# Patient Record
Sex: MALE | Race: WHITE | HISPANIC OR LATINO | Employment: UNEMPLOYED | URBAN - METROPOLITAN AREA
[De-identification: names, ages, dates, MRNs, and addresses within clinical notes are randomized per-mention and may not be internally consistent; named-entity substitution may affect disease eponyms.]

---

## 2017-01-03 ENCOUNTER — OFFICE VISIT (OUTPATIENT)
Dept: ORTHOPEDICS | Facility: CLINIC | Age: 52
End: 2017-01-03

## 2017-01-03 VITALS — BODY MASS INDEX: 29.88 KG/M2 | HEIGHT: 64 IN | WEIGHT: 175 LBS

## 2017-01-03 DIAGNOSIS — M17.10 PATELLOFEMORAL ARTHROSIS: ICD-10-CM

## 2017-01-03 DIAGNOSIS — S89.91XA KNEE INJURY, RIGHT, INITIAL ENCOUNTER: Primary | ICD-10-CM

## 2017-01-03 NOTE — NURSING NOTE
"Reason For Visit:   Chief Complaint   Patient presents with     RECHECK     Right knee injury, 1/1/17 fell on knee, slipped on ice     Left knee is doing well    Occupation: Not working.    Date of injury: 1/1/17  Type of injury: Fell.    Smoker: Yes    Pain Assessment  Patient Currently in Pain: Yes  Primary Pain Location: Knee  Pain Orientation: Right  Pain Descriptors:  (Feels lke \"oil is moving through his knee, elastic feeling\")  Aggravating Factors: Bending    Ht 1.626 m (5' 4\")  Wt 79.379 kg (175 lb)  BMI 30.02 kg/m2                                                     "

## 2017-01-03 NOTE — PROGRESS NOTES
"HISTORY OF PRESENT ILLNESS    Mr. Lockwood returns with improved left knee no pain today, right knee is painful to bend and walking. He states that he 'twisted it and landed on it on the ice. Has swelled up since then'  Location: right knee  Quality:  sharp    Severity:  5   Of 10 at worst and   1 of 10 at best    Duration: 1 week  Timing: with more walking  Context: occurs while walking  Associated symptoms: swelling at times  Additional history: as documented  Problem list, Medication list, Allergies, and Medical/Social/Surgical histories reviewed in [facility] and updated as appropriate.     REVIEW OF SYSTEMS 1/3  Constitutional: negative for fever, chills, change in weight  Skin: negative for worrisome rashes, moles or lesions  Eyes: negative for vision changes or irritation  Neuro: negative for weakness, dizziness or paresthesias  Psychiatric: negative for changes in mood or affect  CV: negative for chest pain, palpitations or peripheral edema  ENT / Mouth: negative for ear, mouth and throat problems  Resp: negative for significant cough or SOB  GI: negative for nausea, abdominal pain, heartburn, or change in bowel habits   MSK: negative for other joint problems currently, refer to HPI, and hand     PHYSICAL EXAM    Vital Signs: Ht 1.626 m (5' 4\")  Wt 79.379 kg (175 lb)  BMI 30.02 kg/m2 Patient declined being weighed. Body mass index is 30.02 kg/(m^2).    General  - normal appearance, in no obvious distress  CV  - normal popliteal pulse  Pulm  - normal respiratory pattern, non-labored  Musculoskeletal - right knee  - stance: normal gait without limp, no obvious leg length discrepancy  - inspection: trace effusion, no ecchymosis, normal muscle tone, normal bone and joint alignment, no obvious deformity  - palpation: medial joint line tenderness, patella and patellar tendon non-tender, normal popliteal pulse  - ROM: 135 degrees flexion, -5 degrees extension, pain at terminal extension passively  - strength: 5/5 in " flexion, 5/5 in extension  - neuro: no sensory or motor deficit  - special tests:  (-) Lachman  (-) anterior drawer  (-) posterior drawer  (-) pivot shift  (+) Raoul laterally    (-) varus at 0 and 30 degrees flexion  (-) valgus at 0 and 30 degrees flexion  (+) Richie s compression test  (-) patellar apprehension  Neuro  - no sensory or motor deficit, grossly normal coordination, normal muscle tone  Skin  - no ecchymosis, erythema, warmth, or induration, no obvious rash  Psych  - interactive, appropriate, normal mood and affect       ASSESSMENT    Mr. Lockwood is a 50 year old year old male who is in the office today for : right knee pain due to contusion, patellofemoral pain      PLAN    Reviewed right knee xray: WNL  Left knee improved  Patient requests cortisone injection for right knee      I recommend ice x 10 min every 2 hours as needed    Gave him a right knee injection    Andrew had all of their questions answered and understand to refrain from activities that are not tolerable    I recommend that Andrew return to my office for a re-examination in 1 month   He should follow up sooner if the condition worsens or if other problems arise.  It was a pleasure taking care of Andrew.    Dr. Aguila Ingram    PROCEDURE:      right   Knee joint injection   NDC 9189-6491-18 kenalog     The patient was apprised of the risks and the benefits of the procedure and consented and a written consent was signed.   The patient s  KNEE was sterilely prepped with Betadine.   40 mg of triamcinolone suspension was drawn up into a 5 mL syringe with 2 mL of 1% lidocaine   The patient was injected with a 1.5-inch 25-gauge needle at the_superiorlateral aspect of their knee joint   There were no complications. The patient tolerated the procedure well. There was minimal bleeding.   The patient was instructed to ice the knee upon leaving clinic and refrain from overuse over the next 3 days.   The patient was instructed to go to the emergency  room with any usual pain, swelling, or redness occurred in the injected area.   The patient was given a followup appointment to evaluate response to the injection to their increased range of motion and reduction of pain.

## 2017-01-03 NOTE — Clinical Note
"1/3/2017       RE: Andrew Lockwood  [address]   [place] [address]     Dear Colleague,    Thank you for referring your patient, Andrew Lockwood, to the [facility] at [facility]. Please see a copy of my visit note below.    HISTORY OF PRESENT ILLNESS    Mr. Lockwood returns with improved left knee no pain today, right knee is painful to bend and walking. He states that he 'twisted it and landed on it on the ice. Has swelled up since then'  Location: right knee  Quality:  sharp    Severity:  5   Of 10 at worst and   1 of 10 at best    Duration: 1 week  Timing: with more walking  Context: occurs while walking  Associated symptoms: swelling at times  Additional history: as documented  Problem list, Medication list, Allergies, and Medical/Social/Surgical histories reviewed in [facility] and updated as appropriate.     REVIEW OF SYSTEMS 1/3  Constitutional: negative for fever, chills, change in weight  Skin: negative for worrisome rashes, moles or lesions  Eyes: negative for vision changes or irritation  Neuro: negative for weakness, dizziness or paresthesias  Psychiatric: negative for changes in mood or affect  CV: negative for chest pain, palpitations or peripheral edema  ENT / Mouth: negative for ear, mouth and throat problems  Resp: negative for significant cough or SOB  GI: negative for nausea, abdominal pain, heartburn, or change in bowel habits   MSK: negative for other joint problems currently, refer to HPI, and hand     PHYSICAL EXAM    Vital Signs: Ht 1.626 m (5' 4\")  Wt 79.379 kg (175 lb)  BMI 30.02 kg/m2 Patient declined being weighed. Body mass index is 30.02 kg/(m^2).    General  - normal appearance, in no obvious distress  CV  - normal popliteal pulse  Pulm  - normal respiratory pattern, non-labored  Musculoskeletal - right knee  - stance: normal gait without limp, no obvious leg length discrepancy  - inspection: trace effusion, no ecchymosis, " normal muscle tone, normal bone and joint alignment, no obvious deformity  - palpation: medial joint line tenderness, patella and patellar tendon non-tender, normal popliteal pulse  - ROM: 135 degrees flexion, -5 degrees extension, pain at terminal extension passively  - strength: 5/5 in flexion, 5/5 in extension  - neuro: no sensory or motor deficit  - special tests:  (-) Lachman  (-) anterior drawer  (-) posterior drawer  (-) pivot shift  (+) Raoul laterally    (-) varus at 0 and 30 degrees flexion  (-) valgus at 0 and 30 degrees flexion  (+) Richie s compression test  (-) patellar apprehension  Neuro  - no sensory or motor deficit, grossly normal coordination, normal muscle tone  Skin  - no ecchymosis, erythema, warmth, or induration, no obvious rash  Psych  - interactive, appropriate, normal mood and affect       ASSESSMENT    Mr. Lockwood is a 50 year old year old male who is in the office today for : right knee pain due to contusion, patellofemoral pain      PLAN    Reviewed right knee xray: WNL  Left knee improved  Patient requests cortisone injection for right knee      I recommend ice x 10 min every 2 hours as needed    Gave him a right knee injection    Andrew had all of their questions answered and understand to refrain from activities that are not tolerable    I recommend that Andrew return to my office for a re-examination in 1 month   He should follow up sooner if the condition worsens or if other problems arise.  It was a pleasure taking care of Andrew.    Dr. Aguila Ingram    PROCEDURE:      right   Knee joint injection   NDC 0171-6791-92 kenalog     The patient was apprised of the risks and the benefits of the procedure and consented and a written consent was signed.   The patient s  KNEE was sterilely prepped with Betadine.   40 mg of triamcinolone suspension was drawn up into a 5 mL syringe with 2 mL of 1% lidocaine   The patient was injected with a 1.5-inch 25-gauge needle at the_superiorlateral  aspect of their knee joint   There were no complications. The patient tolerated the procedure well. There was minimal bleeding.   The patient was instructed to ice the knee upon leaving clinic and refrain from overuse over the next 3 days.   The patient was instructed to go to the emergency room with any usual pain, swelling, or redness occurred in the injected area.   The patient was given a followup appointment to evaluate response to the injection to their increased range of motion and reduction of pain    Again, thank you for allowing me to participate in the care of your patient.      Sincerely,    Aguila Ingram MD

## 2017-01-04 ENCOUNTER — TELEPHONE (OUTPATIENT)
Dept: INFECTIOUS DISEASES | Facility: CLINIC | Age: 52
End: 2017-01-04

## 2017-01-05 ENCOUNTER — OFFICE VISIT (OUTPATIENT)
Dept: INFECTIOUS DISEASES | Facility: CLINIC | Age: 52
End: 2017-01-05
Attending: INTERNAL MEDICINE
Payer: MEDICAID

## 2017-01-05 VITALS
DIASTOLIC BLOOD PRESSURE: 82 MMHG | SYSTOLIC BLOOD PRESSURE: 134 MMHG | HEART RATE: 61 BPM | BODY MASS INDEX: 30.64 KG/M2 | TEMPERATURE: 98.7 F | WEIGHT: 179.5 LBS | HEIGHT: 64 IN

## 2017-01-05 DIAGNOSIS — B58.2 CNS TOXOPLASMOSIS (H): ICD-10-CM

## 2017-01-05 DIAGNOSIS — B20 HUMAN IMMUNODEFICIENCY VIRUS (HIV) DISEASE (H): Primary | ICD-10-CM

## 2017-01-05 DIAGNOSIS — Z00.00 PREVENTATIVE HEALTH CARE: ICD-10-CM

## 2017-01-05 DIAGNOSIS — E11.9 TYPE 2 DIABETES MELLITUS WITHOUT COMPLICATION, WITHOUT LONG-TERM CURRENT USE OF INSULIN (H): ICD-10-CM

## 2017-01-05 PROCEDURE — 99212 OFFICE O/P EST SF 10 MIN: CPT | Mod: ZF

## 2017-01-05 ASSESSMENT — PAIN SCALES - GENERAL: PAINLEVEL: NO PAIN (0)

## 2017-01-05 NOTE — Clinical Note
1/5/2017       RE: Andrew Lockwood  [address]   [facility] [address]     Dear Colleague,    Thank you for referring your patient, Andrew Lockwood, to the [facility] AND INFECTIOUS DISEASES at [facility]. Please see a copy of my visit note below.    [facility] - HIV Progress Note  Dr. Sharon Rosado, [facility], [address] [place] [address], Sixth Floor, [address]  [place] [address]  Patient:  Andrew Lockwood, Date of birth 11/27/1967, Medical record number [id number]  Date of Visit: Jan 5, 2017         Assessment and Recommendations:     Human immunodeficiency virus (HIV) disease (H)  Continue Genvoya.  At present he continues Bactrim both as secondary prophylaxis for Toxoplasmosis and for primary PCP prophylaxis.  Safety and surrogate marker testing not indicated today.  However, we will check these at his next appointment.     CNS toxoplasmosis (H)  Continue secondary prophylaxis for toxoplasmosis.  He just reached a CD4 count of 200 in Nov 2015.  If he continues to maintain this level for the next 6 months, I will consider discontinuing this.       Type 2 diabetes mellitus (H)  I counseled Mr. Lockwood again about adherence to a diabetic diet because he continues to have high sugar food.  I will defer medical management to Dr. Lamb.     Preventative health care  Cardiovascular Juan Jose -               Lipids - CHOL 180, LDL 72 (8/18/2016)              Blood Pressure - /82 mmHg today  Cancer Screening              Colon - Up to date.  [facility]  Immunizations              Hepatitis A - immune, serology 4/13/15              Hepatitis B - 6/16/2016, 3/10/2016, will do third at next appointment              Influenza - Up To date              Pneumovax/Prevnar - Up to date               Tdap - 6/8/14 per [facility]              Menactra - Up to  date.    Renal Health -  UA without proteinuria and creatinine normal in 12/2016.    Sexually Transmitted Infection Risk and Screening              Gonorrhea and Chlamydia - GC/chlamydia negative on 6/2016              Syphilis - negative on 6/2016    Sharon Rosado MD  Division of Infectious Diseases and International Medicine  [phone]         History of Infectious Disease Illness:   Mr. Lockwood is a 51 year old man who I follow for HIV. He comes in for routine follow up.  Today we discussed his ongoing frequent visits to the ED, almost always for similar chronic issues - abdominal pain most frequently that I suspect is gas. I encouraged him to contact us or his PCP for care and I counseled him that opioid based pain management is not appropriate for this.  He again requested a script for Viagra today for erectile dysfunction.  I had recently given him a script that would have provided him will sufficient pills for over a year, so I advised him that I could not refill this.  He reports that his hand warts are much improved.  He is doing well with respect to his HIV medications with good adherence.  No issue with vomiting or diarrhea at this time.  Of note, Mr. Lockwood has recently moved into [facility].  He has not been liking it because he is not getting along with one of the staff members there.  He also does not like that they take some of his benefit to pay for living costs there.  Keesha Montes and I discussed this with him at some length because he is considering leaving and I think that this is not a good decision for his health compared to his recent situation of homelessness.          HIV History:   Date of Diagnosis: 4/2015  Approximated time of transmission:2008  CD4 Josue: 24  Viral Load at Diagnosis: 171,654  Opportunistic Infections:Toxoplasmosis  CMV Status: IgG+ (4/12/15)  Toxo Status: + (4/12/15), Had CNS toxoplasmosis as his primary illness at the time of diagnosis  HLA  Status:  4/25/15 negative  Tuberculosis Screening: Exposure to a friend for treated for active TB several years ago. They did not live together.  Historical use of ARVs: Triumeq, Genvoya  Historical Resistance Mutations: Susceptible        Past Medical and Surgical History:     Past Medical History   Diagnosis Date     Allergic rhinitis due to other allergen      DNS     Diabetes type 2, controlled (H)      HIV (human immunodeficiency virus infection) (H)      AIDS (H)      CNS toxoplasmosis (H)      Sleep apnea      doesn't use CPAP     GERD (gastroesophageal reflux disease)      Chronic abdominal pain      Periungual wart        Past Surgical History   Procedure Laterality Date     C nonspecific procedure       right forearm fracture     Hc explore undesc testis,inguin/scrotal       Optical tracking system craniotomy, excise tumor, combined Left 4/10/2015     Procedure: COMBINED OPTICAL TRACKING SYSTEM CRANIOTOMY, EXCISE TUMOR;  Surgeon: Mirlande Colmenares MD;  Location: [facility] OR     Colonoscopy Left 1/22/2016     Procedure: COMBINED COLONOSCOPY, SINGLE OR MULTIPLE BIOPSY/POLYPECTOMY BY BIOPSY;  Surgeon: Clark Saini MD;  Location: [facility] GI     Laparoscopy diagnostic (general) N/A 7/26/2016     Procedure: LAPAROSCOPY DIAGNOSTIC (GENERAL);  Surgeon: Susannah Arriaga MD;  Location: [facility] OR     Repair ligament ulnar collateral thumb (gamekeeper's) Right 12/2/2016     Procedure: REPAIR LIGAMENT ULNAR COLLATERAL THUMB (GAMEKEEPER'S);  Surgeon: Evin Zamorano MD;  Location: [facility] OR           Family History:     Family History   Problem Relation Age of Onset     DIABETES Brother      DIABETES Father      CANCER No family hx of      no skin cancer     Skin Cancer No family hx of      no famiy hx of skin cancer     Unknown/Adopted Mother      DIABETES Paternal Grandfather      Alzheimer Disease Father            Social History:     Social History   Substance Use Topics     Smoking status: Current Every Day Smoker --  0.25 packs/day     Last Attempt to Quit: 10/28/2016     Smokeless tobacco: Former User      Comment: just smoked very rarely/socailly in the past     Alcohol Use: No      Comment: Last etoh in 2007     Social History     Social History Narrative    Born in [facility] [place].  Came to the [place] in 1993.  Last traveled to visit family in 2008.                 Review of Systems:   CONSTITUTIONAL:  No fevers or chills  RESP: No cough, shortness of breath  CV: No chest Pain, no palpitations  GI: No nausea, vomiting or diarrhea. + abdominal pain - intermittent.  Recent visits to ED, responsive to antacids  INTEGUMENT:  Positive for periungual verruca, improving  NEURO:  Negative for headache         Current Medications:     Current Outpatient Prescriptions   Medication Sig Dispense Refill     metoclopramide (REGLAN) 10 MG tablet Take 1 tablet (10 mg) by mouth 4 times daily (before meals and nightly) 120 tablet 2     nicotine (NICODERM CQ) 21 MG/24HR 24 hr patch Place 1 patch onto the skin every 24 hours 30 patch 0     omeprazole (PRILOSEC) 20 MG CR capsule Take 2 capsules (40 mg) by mouth daily 60 capsule 0     traMADol (ULTRAM) 50 MG tablet Take 1 tablet (50 mg) by mouth every 6 hours as needed for pain 12 tablet 0     omeprazole (PRILOSEC) 20 MG CR capsule Take 2 capsules (40 mg) by mouth daily 60 capsule 0     sucralfate (CARAFATE) 1 GM tablet Take 1 tablet (1 g) by mouth 4 times daily 40 tablet 1     oxyCODONE (ROXICODONE) 5 MG IR tablet Take 1 tablet (5 mg) by mouth every 6 hours as needed for pain 20 tablet 0     ibuprofen (ADVIL/MOTRIN) 200 MG tablet Take 2 tablets (400 mg) by mouth every 8 hours as needed for pain 30 tablet 0     Calcium Carbonate-Simethicone (ROLAIDS PLUS GAS RELIEF EXT ST) 1177-80 MG CHEW Chew 2 tabs tid prn abdomen pain or bloat 84 tablet 1     tadalafil (CIALIS) 20 MG tablet Take 1 tablet (20 mg) by mouth daily as needed for erectile dysfunction 30 tablet 0     GENVOYA 700-960-007-10 MG PO TABS  "TAKE ONE TABLET BY MOUTH EVERY DAY (Patient taking differently: TAKE ONE TABLET BY MOUTH EVERY DAY (at bedtime)) 30 tablet 11     simethicone (GAS RELIEF) 80 MG chewable tablet Take 1 tablet (80 mg) by mouth every 6 hours as needed for flatulence or cramping 30 tablet 0     ranitidine (ZANTAC) 150 MG tablet Take 1 tablet (150 mg) by mouth 2 times daily as needed for heartburn 60 tablet 1     METFORMIN HCL PO Take 1,000 mg by mouth 2 times daily (with meals)       AZITHROMYCIN PO Take 600 mg by mouth once a week       blood glucose (NO BRAND SPECIFIED) lancets standard Use to test blood sugar four times daily or as directed. 100 Box 5     blood glucose monitoring (NO BRAND SPECIFIED) meter device kit Use to test blood sugar four times daily or as directed. 1 kit 0     blood glucose monitoring (NO BRAND SPECIFIED) test strip Use to test blood sugars four times daily or as directed 100 each 5     sulfamethoxazole-trimethoprim (BACTRIM DS,SEPTRA DS) 800-160 MG per tablet Take 1 tablet by mouth 2 times daily       traZODone (DESYREL) 50 MG tablet Take 1 tablet (50 mg) by mouth nightly as needed for sleep 90 tablet 1     metFORMIN (GLUCOPHAGE) 500 MG tablet TAKE TWO TABLETS BY MOUTH TWICE A DAY WITH MEALS 120 tablet 3            Immunization History:     Immunization History   Administered Date(s) Administered     Hepatitis B 03/10/2016, 06/16/2016     Influenza (IIV3) 10/17/2016     Influenza Vaccine IM 3yrs+ 4 Valent IIV4 12/22/2015     Mantoux 04/12/2015     Meningococcal (Menactra ) 08/31/2015, 03/10/2016     Pneumococcal (PCV 13) 03/10/2016     Pneumococcal 23 valent 06/16/2016     TDAP (BOOSTRIX AGES 10-64) 10/17/2016            Allergies:     Allergies   Allergen Reactions     Milk [Lac Bovis] Hives     Tylenol [Acetaminophen] Itching            Physical Exam:   /82 mmHg  Pulse 61  Temp(Src) 98.7  F (37.1  C) (Oral)  Ht 1.626 m (5' 4\")  Wt 81.421 kg (179 lb 8 oz)  BMI 30.80 kg/m2    Physical " Examination:  GENERAL:  well-developed, seated in no acute distress  HEENT:  Head is normocephalic, atraumatic   EYES:  Eyes have anicteric sclerae without conjunctival injection   RESP: BCTA, No W/R/R  CV: RRR no M/R/G  ABD: S NTND  SKIN: No systemic rash  NEUROLOGIC:  Grossly non-focal.           Laboratory Data:     CD4 Count  ABSOLUTE CD4   Date Value Ref Range Status   11/07/2016 200* 441 - 2156 cells/uL Final     CD4 HELPER T   Date Value Ref Range Status   11/07/2016 7* 28 - 63 % Final     CD4:CD8 RATIO   Date Value Ref Range Status   11/07/2016 0.25* 1.40 - 2.60 Final       HIV-1 RNA Quantitative:  HIV-1 RNA QUANT RESULT   Date Value Ref Range Status   11/07/2016  HIVND [Copies]/mL Final    HIV-1 RNA Not Detected   The DAMON AmpliPrep/DAMON TaqMan HIV-1 test is an FDA-approved in vitro nucleic   acid amplification test for the quantitation of HIV-1 RNA in human plasma (EDTA   plasma) using the DAMON AmpliPrep instrument for automated viral nucleic acid   extraction and the DAMON TaqMan Analyzer or [employer] TaqMan for automated Real   Time PCR amplification and detection of the viral nucleic acid target.   Titer results are reported in copies/ml. This assay is intended for use in   conjunction with clinical presentation and other laboratory markers of disease   prognosis and for use as an aid in assessing viral response to antiretroviral   treatment as measured by changes in plasma HIV-1 RNA levels. This test should   not be used as a donor screening test to confirm the presence of HIV-1   infection.         Metabolic Studies       SODIUM   Date Value Ref Range Status   12/28/2016 141 133 - 144 mmol/L Final   12/26/2016 138 133 - 144 mmol/L Final     POTASSIUM   Date Value Ref Range Status   12/28/2016 3.6 3.4 - 5.3 mmol/L Final   12/26/2016 3.4 3.4 - 5.3 mmol/L Final     CHLORIDE   Date Value Ref Range Status   12/28/2016 106 94 - 109 mmol/L Final   12/26/2016 106 94 - 109 mmol/L Final     CARBON DIOXIDE   Date  Value Ref Range Status   12/28/2016 27 20 - 32 mmol/L Final   12/26/2016 23 20 - 32 mmol/L Final     ANION GAP   Date Value Ref Range Status   12/28/2016 8 3 - 14 mmol/L Final   12/26/2016 9 3 - 14 mmol/L Final     UREA NITROGEN   Date Value Ref Range Status   12/28/2016 11 7 - 30 mg/dL Final   12/26/2016 17 7 - 30 mg/dL Final     CREATININE   Date Value Ref Range Status   12/28/2016 0.89 0.66 - 1.25 mg/dL Final   12/26/2016 1.01 0.66 - 1.25 mg/dL Final     GFR ESTIMATE   Date Value Ref Range Status   12/28/2016 90 >60 mL/min/1.7m2 Final     Comment:     Non  GFR Calc   12/26/2016 78 >60 mL/min/1.7m2 Final     Comment:     Non  GFR Calc     GLUCOSE   Date Value Ref Range Status   12/28/2016 108* 70 - 99 mg/dL Final   12/26/2016 111* 70 - 99 mg/dL Final     HEMOGLOBIN A1C   Date Value Ref Range Status   08/18/2016 7.0* 4.3 - 6.0 % Final     CALCIUM   Date Value Ref Range Status   12/28/2016 8.9 8.5 - 10.1 mg/dL Final   12/26/2016 8.9 8.5 - 10.1 mg/dL Final     PHOSPHORUS   Date Value Ref Range Status   12/26/2016 2.9 2.5 - 4.5 mg/dL Final     MAGNESIUM   Date Value Ref Range Status   12/28/2016 2.0 1.6 - 2.3 mg/dL Final   12/26/2016 1.9 1.6 - 2.3 mg/dL Final     LACTIC ACID   Date Value Ref Range Status   12/26/2016 1.6 0.7 - 2.1 mmol/L Final   11/06/2016 1.5 0.7 - 2.1 mmol/L Final       Hepatic Studies    BILIRUBIN TOTAL   Date Value Ref Range Status   12/28/2016 0.4 0.2 - 1.3 mg/dL Final   12/26/2016 0.3 0.2 - 1.3 mg/dL Final     ALKALINE PHOSPHATASE   Date Value Ref Range Status   12/28/2016 82 40 - 150 U/L Final   12/26/2016 80 40 - 150 U/L Final     ALBUMIN   Date Value Ref Range Status   12/28/2016 3.8 3.4 - 5.0 g/dL Final   12/26/2016 4.1 3.4 - 5.0 g/dL Final     AST   Date Value Ref Range Status   12/28/2016 17 0 - 45 U/L Final   12/26/2016 19 0 - 45 U/L Final     ALT   Date Value Ref Range Status   12/28/2016 22 0 - 70 U/L Final   12/26/2016 31 0 - 70 U/L Final        Hematology Studies      WBC   Date Value Ref Range Status   12/28/2016 6.1 4.0 - 11.0 10e9/L Final   12/26/2016 10.9 4.0 - 11.0 10e9/L Final     ABSOLUTE NEUTROPHIL   Date Value Ref Range Status   12/28/2016 3.0 1.6 - 8.3 10e9/L Final   12/26/2016 7.6 1.6 - 8.3 10e9/L Final     ABSOLUTE LYMPHOCYTES   Date Value Ref Range Status   12/28/2016 2.5 0.8 - 5.3 10e9/L Final   12/26/2016 2.4 0.8 - 5.3 10e9/L Final     ABSOLUTE MONOCYTES   Date Value Ref Range Status   12/28/2016 0.5 0.0 - 1.3 10e9/L Final   12/26/2016 0.7 0.0 - 1.3 10e9/L Final     ABSOLUTE EOSINOPHILS   Date Value Ref Range Status   12/28/2016 0.1 0.0 - 0.7 10e9/L Final   12/26/2016 0.1 0.0 - 0.7 10e9/L Final     HEMOGLOBIN   Date Value Ref Range Status   12/28/2016 13.7 13.3 - 17.7 g/dL Final   12/26/2016 14.3 13.3 - 17.7 g/dL Final     HEMATOCRIT   Date Value Ref Range Status   12/28/2016 41.6 40.0 - 53.0 % Final   12/26/2016 42.3 40.0 - 53.0 % Final     PLATELET COUNT   Date Value Ref Range Status   12/28/2016 213 150 - 450 10e9/L Final   12/26/2016 250 150 - 450 10e9/L Final       Hepatitis B Testing     Recent Labs   Lab Test  04/13/15   0816   HBCAB  Nonreactive   HEPBANG  Nonreactive   HBCM  Nonreactive   A nonreactive result suggests lack of recent exposure to the virus in the   preceding 6 months.     HBEABY  Negative  Reference range: Negative  (Note)  Performed by [facility],  [address] [facility] [id number] [phone]  [email or web address], Nayan Corley MD, Lab. Director     HBEAGN  Negative  Reference range: Negative  (Note)  Performed by [facility],  [address] [facility],[place] [id number] [phone]  [email or web address], Nayan Corley MD, Lab. Director         Hepatitis C Testing     HEPATITIS C ANTIBODY   Date Value Ref Range Status   04/13/2015  NR Final    Nonreactive   Assay performance characteristics have not been established for newborns,   infants, and children       Imaging:  Recent Results (from the past 744 hour(s))    Fingers XR, 2-3 views, right    Narrative     XR FINGER RT G/E 2 VW  12/22/2016 2:26 AM      HISTORY: thumb pain    COMPARISON: 11/18/2016    FINDINGS: PA, oblique, lateral views of the left hallux. Casting  material moderately limiting the evaluation of the osseous structures.  Normal bony alignment. No fractures are visualized. No worrisome  osseous lesions.      Impression    IMPRESSION: Study limited by overlying casting material however no  acute osseous abnormalities are seen.    I have personally reviewed the examination and initial interpretation  and I agree with the findings.    ZENY LEONG MD   US Abdomen Limited    Narrative    EXAMINATION: Limited Abdominal Ultrasound, 12/28/2016 4:06 PM     COMPARISON: CT 11/6/2016    HISTORY: Upper abdominal pain; evaluate for gallstones.    FINDINGS:   Fluid: No evidence of ascites or pleural effusions.    Liver: The liver demonstrates normal echotexture, measuring 15.3 cm in  craniocaudal dimension. There is no focal mass. Likely right hepatic  lobe cyst seen on prior CT is not well-visualized on the current exam.    Gallbladder: The gallbladder is partially contracted. There is no wall  thickening, pericholecystic fluid, positive sonographic Mendes's sign  or evidence for cholelithiasis.    Bile Ducts: No intrahepatic biliary ductal dilatation. The common bile  duct was not visualized.      Pancreas: Obscured by overlying bowel gas.    Kidney: The right kidney measures 11.9 cm long. There is no  hydronephrosis or mass.       Impression    IMPRESSION:   1.  Negative right upper quadrant ultrasound. No evidence of  cholelithiasis or cholecystitis.  2.  The pancreas was not visualized.    JOSUE BHATT MD   XR Knee RT 3 vw    Narrative    XR KNEE RT 3 VW 1/3/2017 5:17 PM     HISTORY:  Unspecified injury of right lower leg, initial encounter    COMPARISON: 11/22/2016.    FINDINGS: Films include standing AP, standing lateral and view of the  patellofemoral joint  45 degrees.    Question of subtle subchondral sclerosis medial tibial condyle.  Femoral tibial and patellofemoral joint spaces are normal. There is no  evidence of joint effusion. There is no evidence of patellar tilt or  subluxation.      Impression    IMPRESSION: Question of subtle subchondral sclerosis medial tibial  condyle. No joint space narrowing.    KEIRY MENDEZ MD       Again, thank you for allowing me to participate in the care of your patient.      Sincerely,    Sharon Rosado MD

## 2017-01-05 NOTE — NURSING NOTE
"Chief Complaint   Patient presents with     RECHECK     F/U B20       Initial /82 mmHg  Pulse 61  Temp(Src) 98.7  F (37.1  C) (Oral)  Ht 1.626 m (5' 4\")  Wt 81.421 kg (179 lb 8 oz)  BMI 30.80 kg/m2 Estimated body mass index is 30.8 kg/(m^2) as calculated from the following:    Height as of this encounter: 1.626 m (5' 4\").    Weight as of this encounter: 81.421 kg (179 lb 8 oz).  BP completed using cuff size: regular  "

## 2017-01-09 DIAGNOSIS — E11.9 DM (DIABETES MELLITUS) (H): Primary | ICD-10-CM

## 2017-01-13 ENCOUNTER — OFFICE VISIT (OUTPATIENT)
Dept: DERMATOLOGY | Facility: CLINIC | Age: 52
End: 2017-01-13

## 2017-01-13 ENCOUNTER — OFFICE VISIT (OUTPATIENT)
Dept: INTERNAL MEDICINE | Facility: CLINIC | Age: 52
End: 2017-01-13

## 2017-01-13 VITALS
HEART RATE: 84 BPM | SYSTOLIC BLOOD PRESSURE: 122 MMHG | RESPIRATION RATE: 18 BRPM | OXYGEN SATURATION: 97 % | DIASTOLIC BLOOD PRESSURE: 82 MMHG | BODY MASS INDEX: 31.53 KG/M2 | WEIGHT: 183.8 LBS

## 2017-01-13 DIAGNOSIS — F51.04 PSYCHOPHYSIOLOGICAL INSOMNIA: ICD-10-CM

## 2017-01-13 DIAGNOSIS — B07.8 OTHER VIRAL WARTS: Primary | ICD-10-CM

## 2017-01-13 DIAGNOSIS — L72.0 EPIDERMAL CYST: Primary | ICD-10-CM

## 2017-01-13 DIAGNOSIS — D48.5 NEOPLASM OF UNCERTAIN BEHAVIOR OF SKIN: ICD-10-CM

## 2017-01-13 DIAGNOSIS — F41.9 ANXIETY: ICD-10-CM

## 2017-01-13 PROCEDURE — 88305 TISSUE EXAM BY PATHOLOGIST: CPT | Performed by: DERMATOLOGY

## 2017-01-13 RX ORDER — LIDOCAINE HYDROCHLORIDE AND EPINEPHRINE 10; 10 MG/ML; UG/ML
3 INJECTION, SOLUTION INFILTRATION; PERINEURAL ONCE
Qty: 3 ML | Refills: 0 | OUTPATIENT
Start: 2017-01-13 | End: 2017-01-13

## 2017-01-13 RX ORDER — TRAZODONE HYDROCHLORIDE 50 MG/1
50 TABLET, FILM COATED ORAL
Qty: 90 TABLET | Refills: 1 | Status: SHIPPED
Start: 2017-01-13 | End: 2017-01-23

## 2017-01-13 ASSESSMENT — PAIN SCALES - GENERAL
PAINLEVEL: NO PAIN (0)

## 2017-01-13 NOTE — PROGRESS NOTES
"[facility] Dermatology Note    Dermatology Problem List:  1. HIV positive - last CD4 count 200 and viral load undetectable per patient  2. 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 Plan for treatment with intralesional bleomycin with Dr. Singh at [facility] office. Avoid immunotherapy in the absence of immune system reconstitution. S/p multiple rounds of cryotherapy (4/18, 6/15, 7/22, 8/18, 8/31), Aldara (no reaction), cidofovir 1%, and intermittent topical 5-FU use.    3. Verruca plantaris s/p cryotherapy 4/18, 7/22  4. NUB - right lumbar back s/p biopsy 1/13/17    CC:   Chief Complaint   Patient presents with     Derm Problem     cyst follow up, \"it's growing out very fast.\"     Date of Service: Jan 13, 2017    History of Present Illness:  Mr. Andrew Lockwood is a 51 year old male who presents as a referral from Dr. Dean for an evaluation of a bump on his lower back. Today the patient reports that the of spot of concern is a bother to him. He notes that it is growing out very fast. He had a similar lesion on the right upper back which was excised in the past. He also reports that his warts are slowly getting better due to Vicks vapor rub and the patient is uninterested in treatment today. The patient reports no other lesions of concern at this time.    Otherwise, the patient reports no painful, bleeding, nonhealing, or pruritic lesions, and denies new or changing moles.    Past Medical History:   Patient Active Problem List   Diagnosis     Allergic rhinitis due to other allergen     Brain lesion     Toxoplasma encephalitis (H)     Pulmonary nodules     AIDS with toxoplasmosis     Human immunodeficiency virus (HIV) disease (H)     Folliculitis     Prurigo nodularis     Epidermal cyst     Shoulder joint pain, unspecified laterality     CNS toxoplasmosis (H)     Constipation     Non-intractable vomiting with nausea, vomiting of unspecified type     Thrush     Insomnia, " unspecified insomnia     Bowel obstruction (H)     Toxoplasmosis     Acquired immunodeficiency syndrome (H)     Gastroesophageal reflux disease     Headache     Aphthous ulcer     Type 2 diabetes mellitus (H)     Small bowel obstruction (H)     SBO (small bowel obstruction) (H)     Slow transit constipation     Periungual wart     Herpes zoster     Erectile dysfunction, unspecified erectile dysfunction type     Insomnia, unspecified type     Past Medical History   Diagnosis Date     Allergic rhinitis due to other allergen      DNS     Diabetes type 2, controlled (H)      HIV (human immunodeficiency virus infection) (H)      AIDS (H)      CNS toxoplasmosis (H)      Sleep apnea      doesn't use CPAP     GERD (gastroesophageal reflux disease)      Chronic abdominal pain      Periungual wart      Past Surgical History   Procedure Laterality Date     C nonspecific procedure       right forearm fracture     Hc explore undesc testis,inguin/scrotal       Optical tracking system craniotomy, excise tumor, combined Left 4/10/2015     Procedure: COMBINED OPTICAL TRACKING SYSTEM CRANIOTOMY, EXCISE TUMOR;  Surgeon: Mirlande Colmenares MD;  Location: [facility] OR     Colonoscopy Left 1/22/2016     Procedure: COMBINED COLONOSCOPY, SINGLE OR MULTIPLE BIOPSY/POLYPECTOMY BY BIOPSY;  Surgeon: Clark Saini MD;  Location: [facility] GI     Laparoscopy diagnostic (general) N/A 7/26/2016     Procedure: LAPAROSCOPY DIAGNOSTIC (GENERAL);  Surgeon: Susannah Arriaga MD;  Location: [facility] OR     Repair ligament ulnar collateral thumb (gamekeeper's) Right 12/2/2016     Procedure: REPAIR LIGAMENT ULNAR COLLATERAL THUMB (GAMEKEEPER'S);  Surgeon: Evin Zamorano MD;  Location:  OR     Social History:  The patient is homeless. The patient was born in [facility]. He came [place] the [place] in 1993. He last traveled to visit his family in 2008.     Family History:  There is no family history of skin cancer.     Medications:  Current Outpatient  Prescriptions   Medication Sig Dispense Refill     traZODone (DESYREL) 50 MG tablet Take 1 tablet (50 mg) by mouth nightly as needed for sleep 90 tablet 1     metFORMIN (GLUCOPHAGE) 500 MG tablet TAKE TWO TABLETS BY MOUTH TWICE A DAY WITH MEALS 120 tablet 3     metoclopramide (REGLAN) 10 MG tablet Take 1 tablet (10 mg) by mouth 4 times daily (before meals and nightly) 120 tablet 2     nicotine (NICODERM CQ) 21 MG/24HR 24 hr patch Place 1 patch onto the skin every 24 hours 30 patch 0     omeprazole (PRILOSEC) 20 MG CR capsule Take 2 capsules (40 mg) by mouth daily 60 capsule 0     traMADol (ULTRAM) 50 MG tablet Take 1 tablet (50 mg) by mouth every 6 hours as needed for pain 12 tablet 0     omeprazole (PRILOSEC) 20 MG CR capsule Take 2 capsules (40 mg) by mouth daily 60 capsule 0     sucralfate (CARAFATE) 1 GM tablet Take 1 tablet (1 g) by mouth 4 times daily 40 tablet 1     oxyCODONE (ROXICODONE) 5 MG IR tablet Take 1 tablet (5 mg) by mouth every 6 hours as needed for pain 20 tablet 0     ibuprofen (ADVIL/MOTRIN) 200 MG tablet Take 2 tablets (400 mg) by mouth every 8 hours as needed for pain 30 tablet 0     Calcium Carbonate-Simethicone (ROLAIDS PLUS GAS RELIEF EXT ST) 1177-80 MG CHEW Chew 2 tabs tid prn abdomen pain or bloat 84 tablet 1     tadalafil (CIALIS) 20 MG tablet Take 1 tablet (20 mg) by mouth daily as needed for erectile dysfunction 30 tablet 0     GENVOYA [phone] MG PO TABS TAKE ONE TABLET BY MOUTH EVERY DAY (Patient taking differently: TAKE ONE TABLET BY MOUTH EVERY DAY (at bedtime)) 30 tablet 11     simethicone (GAS RELIEF) 80 MG chewable tablet Take 1 tablet (80 mg) by mouth every 6 hours as needed for flatulence or cramping 30 tablet 0     ranitidine (ZANTAC) 150 MG tablet Take 1 tablet (150 mg) by mouth 2 times daily as needed for heartburn 60 tablet 1     METFORMIN HCL PO Take 1,000 mg by mouth 2 times daily (with meals)       AZITHROMYCIN PO Take 600 mg by mouth once a week       blood  glucose (NO BRAND SPECIFIED) lancets standard Use to test blood sugar four times daily or as directed. 100 Box 5     blood glucose monitoring (NO BRAND SPECIFIED) meter device kit Use to test blood sugar four times daily or as directed. 1 kit 0     blood glucose monitoring (NO BRAND SPECIFIED) test strip Use to test blood sugars four times daily or as directed 100 each 5     sulfamethoxazole-trimethoprim (BACTRIM DS,SEPTRA DS) 800-160 MG per tablet Take 1 tablet by mouth 2 times daily       Allergies:  Allergies   Allergen Reactions     Milk [Lac Bovis] Hives     Tylenol [Acetaminophen] Itching     Review of Systems:  - Skin: As above in HPI. No additional skin concerns.    Physical exam:  Vitals: There were no vitals taken for this visit.  GEN: This is a well developed, well-nourished male in no acute distress, in a pleasant mood.      SKIN: Focused examination of the hands and lower back was performed.  - There has been interval improvement, but currently there is a thin ridge approximately 6 mm in length near the left lateral nail fold of the 2nd digit though reportedly smaller than previous  - 1 cm ridge on the medial aspect of the left 5th finger  - Larger 13 mm linear verrucous plaque on the lateral aspect of the right 3rd finger  - On the left lumbar back, there is a 6 mm fleshy tan to whitish papule that slightly invaginates with pressure, no overlying punctum or significant vascular alterations.   - No other lesions of concern on areas examined.     Impression/Plan:  1. Verruca vulgaris, recalcitrant - followed by Dr. Singh both here and in private practice  - The patient is confident that Vicks vapor rub is improving the lesions, but it was discussed that this is probably not the case.   - Discussion of possibly treatments including Efudex cream, wart stick, and the injections provided by   - The patient reports that he does not have any money to purchase the wart stick.   - The patient would  like to do the injections (bleomycin) provided by Dr. Singh and will follow up with him.     2. 6 mm fleshy tan to whitish papule - right lumbar back. Neoplasm of uncertain behavior. Differential diagnosis includes neurofibroma vs intradermal nevus vs cyst vs other.   - Punch biopsy:  After discussion of benefits and risks including but not limited to bleeding/bruising, pain/swelling, infection, scar, incomplete removal, nerve damage/numbness, recurrence, and non-diagnostic biopsy, written consent, verbal consent and photographs were obtained. Time-out was performed. The area was cleaned with isopropyl alcohol. 0.5 mL of 1% lidocaine with epinephrine was injected to obtain adequate anesthesia of the lesion on the right lumbar back. A 8 mm punch biopsy was performed.  4-0 prolene sutures were utilized to approximate the epidermal edges.  White petroleum jelly/VaselineTM and a bandage was applied to the wound.  Explicit verbal and written wound care instructions were provided.  The patient left the Dermatology Clinic in good condition. The patient was counseled to follow up for suture removal in approximately 14 days.    Follow-up pending biopsy results, earlier for new or changing lesions.    Staff Involved:  Staff Only    Scribe Disclosure:   I, Eric Francisco, am serving as a scribe to document services personally performed by Dr. Sachin Becerra, based on data collection and the provider's statements to me.     Staff attestation:  The documentation recorded by the scribe accurately reflects the services I personally performed and the decisions I personally made.    Sachin Becerra MD  Staff Dermatologist    Department of Dermatology

## 2017-01-13 NOTE — MR AVS SNAPSHOT
After Visit Summary   1/13/2017    Andrew Lockwood    MRN: [id number]           Patient Information     Date Of Birth          1965        Visit Information        Provider Department      1/13/2017 12:30 PM Sachin Becerra MD [facility] Dermatology        Today's Diagnoses     Neoplasm of uncertain behavior of skin    -  1       Care Instructions    Wound Care After a Biopsy    What is a skin biopsy?  A skin biopsy allows the doctor to examine a very small piece of tissue under the microscope to determine the diagnosis and the best treatment for the skin condition. A local anesthetic (numbing medicine)  is injected with a very small needle into the skin area to be tested. A small piece of skin is taken from the area. Sometimes a suture (stitch) is used.     What are the risks of a skin biopsy?  I will experience scar, bleeding, swelling, pain, crusting and redness. I may experience incomplete removal or recurrence. Risks of this procedure are excessive bleeding, bruising, infection, nerve damage, numbness, thick (hypertrophic or keloidal) scar and non-diagnostic biopsy.    How should I care for my wound for the first 24 hours?    Keep the wound dry and covered for 24 hours    If it bleeds, hold direct pressure on the area for 15 minutes. If bleeding does not stop then go to the emergency room    Avoid strenuous exercise the first 1-2 days or as your doctor instructs you    How should I care for the wound after 24 hours?    After 24 hours, remove the bandage    You may bathe or shower as normal    If you had a scalp biopsy, you can shampoo as usual and can use shower water to clean the biopsy site daily    Clean the wound twice a day with gentle soap and water    Do not scrub, be gentle    Apply white petroleum/Vaseline after cleaning the wound with a cotton swab or a clean finger, and keep the site covered with a Bandaid /bandage. Bandages are not necessary with a scalp biopsy    If you are  unable to cover the site with a Bandaid /bandage, re-apply ointment 2-3 times a day to keep the site moist. Moisture will help with healing    Avoid strenuous activity for first 1-2 days    Avoid lakes, rivers, pools, and oceans until the stitches are removed or the site is healed    How do I clean my wound?    Wash hands for 15 minutes with soap or use hand  before all wound care    Clean the wound with gentle soap and water    Apply white petroleum/Vaseline  to wound after it is clean    Replace the Bandaid /bandage to keep the wound covered for the first few days or as instructed by your doctor    If you had a scalp biopsy, warm shower water to the area on a daily basis should suffice    What should I use to clean my wound?     Cotton-tipped applicators (Qtips )    White petroleum jelly (Vaseline ). Use a clean new container and use Q-tips to apply.    Bandaids   as needed    Gentle soap     How should I care for my wound long term?    Do not get your wound dirty    Keep up with wound care for one week or until the area is healed.    A small scab will form and fall off by itself when the area is completely healed. The area will be red and will become pink in color as it heals. Sun protection is very important for how your scar will turn out. Sunscreen with an SPF 30 or greater is recommended once the area is healed.    If you have stitches, stitches need to be removed in 14 days. You may return to our clinic for this or you may have it done locally at your doctor s office.    You should have some soreness but it should be mild and slowly go away over several days. Talk to your doctor about using tylenol for pain,    When should I call my doctor?  If you have increased:     Pain or swelling    Pus or drainage (clear or slightly yellow drainage is ok)    Temperature over 100F    Spreading redness or warmth around wound    When will I hear about my results?  The biopsy results can take 2-3 weeks to come  back. The clinic will call you with the results, send you a mychart message, or have you schedule a follow-up clinic or phone time to discuss the results. Contact our clinics if you do not hear from us in 3 weeks.     Who should I call with questions?    [facility]: [phone]     [facility]: [phone]    For urgent needs outside of business hours call the [facility] at [phone] and ask for the dermatology resident on call            Follow-ups after your visit        Your next 10 appointments already scheduled     Jan 16, 2017  4:10 PM   (Arrive by 3:55 PM)   JULIAN Extremity with Florence Shaw PT   [facility] Physical Therapy JULIAN ([facility])    [address] [place] [address]  [address] [facility] [id number] [phone]            Jan 18, 2017  2:00 PM   (Arrive by 1:45 PM)   JULIAN Extremity with Florence Shaw PT   [facility] Physical Therapy JULIAN ([facility])    [address] [place] [address]  [address] [facility] [id number] [phone]            Jan 18, 2017  4:00 PM   (Arrive by 3:45 PM)   Return Visit with  U Ortho Nurse   [facility] Orthopaedic Clinic ([facility])    [address] [place] [address]  [address] [facility] [id number] [phone]            Jan 20, 2017  2:15 PM   (Arrive by 2:00 PM)   Return Visit with Aguila Ingram MD   [facility] Orthopa[facility] ([facility])    [address] [place] [address]  [address] [facility] [id number] [phone]            Jan 27, 2017  9:00 AM   Nurse Visit with  Dermatology Nurse   [facility] Dermatology ([facility])    [address] [place] [address]  [address] [facility] [id number] [phone]            Apr 25, 2017  8:00 AM   (Arrive by 7:45 AM)   New Patient Visit with Jonas Alan   Gastroenterology and IBD ([facility])    [address] [place] [address]  Se  4th Floor  [facility] [id number] [phone]              Who to contact     Please call your clinic at [phone] to:    Ask questions about your health    Make or cancel appointments    Discuss your medicines    Learn about your test results    Speak to your doctor   If you have compliments or concerns about an experience at your clinic, or if you wish to file a complaint, please contact [facility] Patient Relations at [phone] or email us at [email or web address]         Additional Information About Your Visit        [employer] Information     [employer] gives you secure access to your electronic health record. If you see a primary care provider, you can also send messages to your care team and make appointments. If you have questions, please call your primary care clinic.  If you do not have a primary care provider, please call [phone] and they will assist you.      [employer] is an electronic gateway that provides easy, online access to your medical records. With [employer], you can request a clinic appointment, read your test results, renew a prescription or communicate with your care team.     To access your existing account, please contact your [facility] Clinic or call [phone] for assistance.        Care EveryWhere ID     This is your Care EveryWhere ID. This could be used by other organizations to access your [place] medical records  [id number]         Blood Pressure from Last 3 Encounters:   01/13/17 122/82   01/05/17 134/82   12/29/16 134/89    Weight from Last 3 Encounters:   01/13/17 83.371 kg (183 lb 12.8 oz)   01/05/17 81.421 kg (179 lb 8 oz)   01/03/17 79.379 kg (175 lb)              We Performed the Following     BIOPSY SKIN/SUBQ/MUC MEM, SINGLE LESION     Surgical pathology exam          Today's Medication Changes          These changes are accurate as of: 1/13/17  1:09 PM.  If you have any questions, ask your nurse or  doctor.               Start taking these medicines.        Dose/Directions    lidocaine 1% with EPINEPHrine 1:100,000 1 %-1:349110 injection   Used for:  Neoplasm of uncertain behavior of skin   Started by:  Sachin Becerra MD        Dose:  3 mL   Inject 3 mLs into the skin once for 1 dose   Quantity:  3 mL   Refills:  0       traZODone 50 MG tablet   Commonly known as:  DESYREL   Used for:  Psychophysiological insomnia   Started by:  Margareth Dean APRN CNP        Dose:  50 mg   Take 1 tablet (50 mg) by mouth nightly as needed for sleep   Quantity:  90 tablet   Refills:  1         These medicines have changed or have updated prescriptions.        Dose/Directions    GENVOYA [phone] MG Tabs per tablet   This may have changed:  See the new instructions.   Used for:  Human immunodeficiency virus (HIV) disease (H)   Generic drug:  Cvwjceo-Ddemq-Ljerwzhh-TenofAF        TAKE ONE TABLET BY MOUTH EVERY DAY   Quantity:  30 tablet   Refills:  11            Where to get your medicines      These medications were sent to [place] [id number] [facility] [address]  [address] [place] [address] [address] [id number]    Hours:  TRANSPLANT PHONE NUMBER [phone] Phone:  [phone]    - traZODone 50 MG tablet      Some of these will need a paper prescription and others can be bought over the counter.  Ask your nurse if you have questions.     You don't need a prescription for these medications    - lidocaine 1% with EPINEPHrine 1:100,000 1 %-1:919719 injection             Primary Care Provider Office Phone # Fax #    Sharon Rosado [phone]       [address]  [facility] [address]        Thank you!     Thank you for choosing [facility] DERMATOLOGY  for your care. Our goal is always to provide you with excellent care. Hearing back from our patients is one way we can continue to improve our services. Please take a  few minutes to complete the written survey that you may receive in the mail after your visit with us. Thank you!             Your Updated Medication List - Protect others around you: Learn how to safely use, store and throw away your medicines at www.disposemymeds.org.          This list is accurate as of: 1/13/17  1:09 PM.  Always use your most recent med list.                   Brand Name Dispense Instructions for use    AZITHROMYCIN PO      Take 600 mg by mouth once a week       blood glucose lancets standard    no brand specified    100 Box    Use to test blood sugar four times daily or as directed.       blood glucose monitoring meter device kit    no brand specified    1 kit    Use to test blood sugar four times daily or as directed.       blood glucose monitoring test strip    no brand specified    100 each    Use to test blood sugars four times daily or as directed       Calcium Carbonate-Simethicone 1177-80 MG Chew    ROLAIDS PLUS GAS RELIEF EXT ST    84 tablet    Chew 2 tabs tid prn abdomen pain or bloat       GENVOYA [phone] MG Tabs per tablet   Generic drug:  Lgqcgwh-Ovysy-Jrepngig-TenofAF     30 tablet    TAKE ONE TABLET BY MOUTH EVERY DAY       ibuprofen 200 MG tablet    ADVIL/MOTRIN    30 tablet    Take 2 tablets (400 mg) by mouth every 8 hours as needed for pain       lidocaine 1% with EPINEPHrine 1:100,000 1 %-1:509291 injection     3 mL    Inject 3 mLs into the skin once for 1 dose       * METFORMIN HCL PO      Take 1,000 mg by mouth 2 times daily (with meals)       * metFORMIN 500 MG tablet    GLUCOPHAGE    120 tablet    TAKE TWO TABLETS BY MOUTH TWICE A DAY WITH MEALS       metoclopramide 10 MG tablet    REGLAN    120 tablet    Take 1 tablet (10 mg) by mouth 4 times daily (before meals and nightly)       nicotine 21 MG/24HR 24 hr patch    NICODERM CQ    30 patch    Place 1 patch onto the skin every 24 hours       * omeprazole 20 MG CR capsule    priLOSEC    60 capsule    Take 2 capsules  (40 mg) by mouth daily       * omeprazole 20 MG CR capsule    priLOSEC    60 capsule    Take 2 capsules (40 mg) by mouth daily       oxyCODONE 5 MG IR tablet    ROXICODONE    20 tablet    Take 1 tablet (5 mg) by mouth every 6 hours as needed for pain       ranitidine 150 MG tablet    ZANTAC    60 tablet    Take 1 tablet (150 mg) by mouth 2 times daily as needed for heartburn       simethicone 80 MG chewable tablet    GAS RELIEF    30 tablet    Take 1 tablet (80 mg) by mouth every 6 hours as needed for flatulence or cramping       sucralfate 1 GM tablet    CARAFATE    40 tablet    Take 1 tablet (1 g) by mouth 4 times daily       sulfamethoxazole-trimethoprim 800-160 MG per tablet    BACTRIM DS/SEPTRA DS     Take 1 tablet by mouth 2 times daily       tadalafil 20 MG tablet    CIALIS    30 tablet    Take 1 tablet (20 mg) by mouth daily as needed for erectile dysfunction       traMADol 50 MG tablet    ULTRAM    12 tablet    Take 1 tablet (50 mg) by mouth every 6 hours as needed for pain       traZODone 50 MG tablet    DESYREL    90 tablet    Take 1 tablet (50 mg) by mouth nightly as needed for sleep       * Notice:  This list has 4 medication(s) that are the same as other medications prescribed for you. Read the directions carefully, and ask your doctor or other care provider to review them with you.

## 2017-01-13 NOTE — NURSING NOTE
Chief Complaint   Patient presents with     Insomnia     Patient is here to discuss problems falling and staying asleep. Duration 1 month got worse over the past 4 days.      Derm Problem     Patient is here to discuss a possible cyst on left lower back.      Bianka Norton LPN at 10:33 AM on 1/13/2017.

## 2017-01-13 NOTE — PATIENT INSTRUCTIONS
[facility] Medication Refill Request Information:  * Please contact your pharmacy regarding ANY request for medication refills.  ** [facility] Prescription Fax = [phone]  * Please allow 3 business days for routine medication refills.  * Please allow 5 business days for controlled substance medication refills.     [facility] Test Result notification information:  *You will be notified with in 7-10 days of your appointment day regarding the results of your test.  If you are on MyChart you will be notified as soon as the provider has reviewed the results and signed off on them.    [facility] Phone Number [phone]      Insomnia  Insomnia refers to a difficulty going to sleep or staying asleep, or both. Insomnia has many causes, including anxiety, stress, depression, chronic pain, sleeping cycles out of balance due to working night shifts or excess napping during the day, and a condition called  sleep apnea . Insomnia can be a side effect from stimulant medicines such as decongestants, asthma inhalers and pills, diet pills, and illegal drugs such as speed, crank, crack, and PCP.  Home Care:  1. Review your medicines with your doctor or pharmacist to find out if they can cause insomnia.  2. Caffeine, smoking and alcohol also affect sleep. Limit your daily use and do not use these before bedtime. Alcohol may make you sleepy at first, but as its effects wear off, you may awaken a few hours later and have trouble returning to sleep.  3. Do not exercise, eat or drink large amounts of liquid within 2 hours of your bedtime.  4. Improve your sleep habits. Have a fixed bed and wake-up time. Try to keep noise, light and heat in your bedroom at a comfortable level. Try using earplugs or eyeshades if needed.   5. Avoid watching TV in bed.  6. If you do not fall asleep within 30 minutes, try to relax by reading or listening to soft music.  7. Limit daytime napping to one 30 minute period, early in the  day.  8. Get regular exercise. Find other ways to lessen your stress level.  9. If a medicine was prescribed to help reset your sleep patterns, take it as directed. Sleeping pills are intended for short-term use, only. If taken for too long, the effect wears off while the risk of physical addiction and psychological dependence increases.  Follow-Up  with your doctor or as directed by our staff if you feel that your insomnia is not responding to the above measures.  Get Prompt Medical Attention  if any of the following occur:    Extreme restlessness or irritability    Confusion or hallucinations (seeing or hearing things that are not there)    Anxiety, depression    Several days without sleeping    1163-4366 [employer]. [address] [place] [address]. All rights reserved. This information is not intended as a substitute for professional medical care. Always follow your healthcare professional's instructions.      Health Psychology (Dr. Osbaldo Panchal) [phone] (4th Floor [facility])   Health Psychology (Dr. Mirtha Drew) [phone] (4th Floor [facility])   Health Psychology (Dr. Rosemarie Alejandra) [phone] (4th Floor [facility])   Health Psychology (Dr. Arlene De Souza) [phone] (4th Floor [facility])  Health Psychology (Dr. Elizabeth Nevarez) [phone] (4th Floor [facility])    Dermatology [phone] (3rd Floor [facility])

## 2017-01-13 NOTE — Clinical Note
"1/13/2017       RE: Andrew Lockwood  [address]   [facility] [address]     Dear Colleague,    Thank you for referring your patient, Andrew Lockwood, to the [facility] DERMATOLOGY at [facility]. Please see a copy of my visit note below.    [facility] Dermatology Note    Dermatology Problem List:  1. HIV positive - last CD4 count 200 and viral load undetectable per patient  2. 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 Plan for treatment with intralesional bleomycin with Dr. Singh at [facility] office. Avoid immunotherapy in the absence of immune system reconstitution. S/p multiple rounds of cryotherapy (4/18, 6/15, 7/22, 8/18, 8/31), Aldara (no reaction), cidofovir 1%, and intermittent topical 5-FU use.    3. Verruca plantaris s/p cryotherapy 4/18, 7/22  4. NUB - right lumbar back s/p biopsy 1/13/17    CC:   Chief Complaint   Patient presents with     Derm Problem     cyst follow up, \"it's growing out very fast.\"     Date of Service: Jan 13, 2017    History of Present Illness:  Mr. Anderw Lockwood is a 51 year old male who presents as a referral from Dr. Dean for an evaluation of a bump on his lower back. Today the patient reports that the of spot of concern is a bother to him. He notes that it is growing out very fast. He had a similar lesion on the right upper back which was excised in the past. He also reports that his warts are slowly getting better due to Vicks vapor rub and the patient is uninterested in treatment today. The patient reports no other lesions of concern at this time.    Otherwise, the patient reports no painful, bleeding, nonhealing, or pruritic lesions, and denies new or changing moles.    Past Medical History:   Patient Active Problem List   Diagnosis     Allergic rhinitis due to other allergen     Brain lesion     Toxoplasma encephalitis (H)     Pulmonary nodules     AIDS with toxoplasmosis     Human " immunodeficiency virus (HIV) disease (H)     Folliculitis     Prurigo nodularis     Epidermal cyst     Shoulder joint pain, unspecified laterality     CNS toxoplasmosis (H)     Constipation     Non-intractable vomiting with nausea, vomiting of unspecified type     Thrush     Insomnia, unspecified insomnia     Bowel obstruction (H)     Toxoplasmosis     Acquired immunodeficiency syndrome (H)     Gastroesophageal reflux disease     Headache     Aphthous ulcer     Type 2 diabetes mellitus (H)     Small bowel obstruction (H)     SBO (small bowel obstruction) (H)     Slow transit constipation     Periungual wart     Herpes zoster     Erectile dysfunction, unspecified erectile dysfunction type     Insomnia, unspecified type     Past Medical History   Diagnosis Date     Allergic rhinitis due to other allergen      DNS     Diabetes type 2, controlled (H)      HIV (human immunodeficiency virus infection) (H)      AIDS (H)      CNS toxoplasmosis (H)      Sleep apnea      doesn't use CPAP     GERD (gastroesophageal reflux disease)      Chronic abdominal pain      Periungual wart      Past Surgical History   Procedure Laterality Date     C nonspecific procedure       right forearm fracture     Hc explore undesc testis,inguin/scrotal       Optical tracking system craniotomy, excise tumor, combined Left 4/10/2015     Procedure: COMBINED OPTICAL TRACKING SYSTEM CRANIOTOMY, EXCISE TUMOR;  Surgeon: Mirlande Colmenares MD;  [facility] OR     Colonoscopy Left 1/22/2016     Procedure: COMBINED COLONOSCOPY, SINGLE OR MULTIPLE BIOPSY/POLYPECTOMY BY BIOPSY;  Surgeon: Clark Saini MD;  Location: [facility] GI     Laparoscopy diagnostic (general) N/A 7/26/2016     Procedure: LAPAROSCOPY DIAGNOSTIC (GENERAL);  Surgeon: Susannah Arriaga MD;  Location: [facility] OR     Repair ligament ulnar collateral thumb (gamekeeper's) Right 12/2/2016     Procedure: REPAIR LIGAMENT ULNAR COLLATERAL THUMB (GAMEKEEPER'S);  Surgeon: Evin Zamorano MD;   [facility] OR     Social History:  The patient is homeless. The patient was born in [facility]. [place] came [place]. He last traveled to visit his family in 2008.     Family History:  There is no family history of skin cancer.     Medications:  Current Outpatient Prescriptions   Medication Sig Dispense Refill     traZODone (DESYREL) 50 MG tablet Take 1 tablet (50 mg) by mouth nightly as needed for sleep 90 tablet 1     metFORMIN (GLUCOPHAGE) 500 MG tablet TAKE TWO TABLETS BY MOUTH TWICE A DAY WITH MEALS 120 tablet 3     metoclopramide (REGLAN) 10 MG tablet Take 1 tablet (10 mg) by mouth 4 times daily (before meals and nightly) 120 tablet 2     nicotine (NICODERM CQ) 21 MG/24HR 24 hr patch Place 1 patch onto the skin every 24 hours 30 patch 0     omeprazole (PRILOSEC) 20 MG CR capsule Take 2 capsules (40 mg) by mouth daily 60 capsule 0     traMADol (ULTRAM) 50 MG tablet Take 1 tablet (50 mg) by mouth every 6 hours as needed for pain 12 tablet 0     omeprazole (PRILOSEC) 20 MG CR capsule Take 2 capsules (40 mg) by mouth daily 60 capsule 0     sucralfate (CARAFATE) 1 GM tablet Take 1 tablet (1 g) by mouth 4 times daily 40 tablet 1     oxyCODONE (ROXICODONE) 5 MG IR tablet Take 1 tablet (5 mg) by mouth every 6 hours as needed for pain 20 tablet 0     ibuprofen (ADVIL/MOTRIN) 200 MG tablet Take 2 tablets (400 mg) by mouth every 8 hours as needed for pain 30 tablet 0     Calcium Carbonate-Simethicone (ROLAIDS PLUS GAS RELIEF EXT ST) 1177-80 MG CHEW Chew 2 tabs tid prn abdomen pain or bloat 84 tablet 1     tadalafil (CIALIS) 20 MG tablet Take 1 tablet (20 mg) by mouth daily as needed for erectile dysfunction 30 tablet 0     GENVOYA [phone] MG PO TABS TAKE ONE TABLET BY MOUTH EVERY DAY (Patient taking differently: TAKE ONE TABLET BY MOUTH EVERY DAY (at bedtime)) 30 tablet 11     simethicone (GAS RELIEF) 80 MG chewable tablet Take 1 tablet (80 mg) by mouth every 6 hours as needed for flatulence or  cramping 30 tablet 0     ranitidine (ZANTAC) 150 MG tablet Take 1 tablet (150 mg) by mouth 2 times daily as needed for heartburn 60 tablet 1     METFORMIN HCL PO Take 1,000 mg by mouth 2 times daily (with meals)       AZITHROMYCIN PO Take 600 mg by mouth once a week       blood glucose (NO BRAND SPECIFIED) lancets standard Use to test blood sugar four times daily or as directed. 100 Box 5     blood glucose monitoring (NO BRAND SPECIFIED) meter device kit Use to test blood sugar four times daily or as directed. 1 kit 0     blood glucose monitoring (NO BRAND SPECIFIED) test strip Use to test blood sugars four times daily or as directed 100 each 5     sulfamethoxazole-trimethoprim (BACTRIM DS,SEPTRA DS) 800-160 MG per tablet Take 1 tablet by mouth 2 times daily       Allergies:  Allergies   Allergen Reactions     Milk [Lac Bovis] Hives     Tylenol [Acetaminophen] Itching     Review of Systems:  - Skin: As above in HPI. No additional skin concerns.    Physical exam:  Vitals: There were no vitals taken for this visit.  GEN: This is a well developed, well-nourished male in no acute distress, in a pleasant mood.      SKIN: Focused examination of the hands and lower back was performed.  - There has been interval improvement, but currently there is a thin ridge approximately 6 mm in length near the left lateral nail fold of the 2nd digit though reportedly smaller than previous  - 1 cm ridge on the medial aspect of the left 5th finger  - Larger 13 mm linear verrucous plaque on the lateral aspect of the right 3rd finger  - On the left lumbar back, there is a 6 mm fleshy tan to whitish papule that slightly invaginates with pressure, no overlying punctum or significant vascular alterations.   - No other lesions of concern on areas examined.     Impression/Plan:  1. Verruca vulgaris, recalcitrant - followed by Dr. Singh both here and in private practice  - The patient is confident that Vicks vapor rub is improving the lesions,  but it was discussed that this is probably not the case.   - Discussion of possibly treatments including Efudex cream, wart stick, and the injections provided by   - The patient reports that he does not have any money to purchase the wart stick.   - The patient would like to do the injections (bleomycin) provided by Dr. Singh and will follow up with him.     2. 6 mm fleshy tan to whitish papule - right lumbar back. Neoplasm of uncertain behavior. Differential diagnosis includes neurofibroma vs intradermal nevus vs cyst vs other.   - Punch biopsy:  After discussion of benefits and risks including but not limited to bleeding/bruising, pain/swelling, infection, scar, incomplete removal, nerve damage/numbness, recurrence, and non-diagnostic biopsy, written consent, verbal consent and photographs were obtained. Time-out was performed. The area was cleaned with isopropyl alcohol. 0.5 mL of 1% lidocaine with epinephrine was injected to obtain adequate anesthesia of the lesion on the right lumbar back. A 8 mm punch biopsy was performed.  4-0 prolene sutures were utilized to approximate the epidermal edges.  White petroleum jelly/VaselineTM and a bandage was applied to the wound.  Explicit verbal and written wound care instructions were provided.  The patient left the Dermatology Clinic in good condition. The patient was counseled to follow up for suture removal in approximately 14 days.    Follow-up pending biopsy results, earlier for new or changing lesions.    Staff Involved:  Staff Only    Scribe Disclosure:   I, Eric Francisco, am serving as a scribe to document services personally performed by Dr. Sachin Becerra, based on data collection and the provider's statements to me.     Staff attestation:  The documentation recorded by the scribe accurately reflects the services I personally performed and the decisions I personally made.    Sachin Becerra MD  Staff Dermatologist    Department of  Dermatology

## 2017-01-13 NOTE — PROGRESS NOTES
"Andrew Lockwood is a 51 year old male who comes in for    CC: cyst, insomnia  HPI:  Cyst--L side of back, itchy. Feels filled with water, itchy, not painful. Has had this for 2 years.     Insomnia--started when he had toxoplasmosis, got very sick with this, didn't sleep for many days; was full of energy because it was \"interfering with his brain.\" Has had good results with Ambien in the past. CD4 counts improved, but now he can't sleep again. Sleeps from 5 am to 10 am. Reads or is on the Internet at night. Has trouble falling asleep, then wakes up after 20 minutes. Has trouble turning his thoughts off. Feels anxious a lot. Went to ER for panic. Feels anxious--lives alone, was homeless, and now has a place to live but hates living alone. Son lives in [facility], 2 daughters live in [place]. Does not feel he has friends he can talk to.       Other issues discussed today:     Patient Active Problem List   Diagnosis     Allergic rhinitis due to other allergen     Brain lesion     Toxoplasma encephalitis (H)     Pulmonary nodules     Altered mental status     AIDS with toxoplasmosis     Verruca     Acne rosacea     Human immunodeficiency virus (HIV) disease (H)     Folliculitis     Prurigo nodularis     Epidermal cyst     Shoulder joint pain, unspecified laterality     CNS toxoplasmosis (H)     Weakness     JULIANNE (acute kidney injury) (H)     Constipation     Non-intractable vomiting with nausea, vomiting of unspecified type     Thrush     Anal ulceration     Insomnia, unspecified insomnia     Diarrhea     Bowel obstruction (H)     Toxoplasmosis     Acquired immunodeficiency syndrome (H)     Gastroesophageal reflux disease     Headache     Aphthous ulcer     Type 2 diabetes mellitus (H)     Small bowel obstruction (H)     SBO (small bowel obstruction) (H)     Slow transit constipation     Periungual wart     Herpes zoster     Erectile dysfunction, unspecified erectile dysfunction type     Insomnia, unspecified type "       Current Outpatient Prescriptions   Medication Sig Dispense Refill     metFORMIN (GLUCOPHAGE) 500 MG tablet TAKE TWO TABLETS BY MOUTH TWICE A DAY WITH MEALS 120 tablet 3     metoclopramide (REGLAN) 10 MG tablet Take 1 tablet (10 mg) by mouth 4 times daily (before meals and nightly) 120 tablet 2     nicotine (NICODERM CQ) 21 MG/24HR 24 hr patch Place 1 patch onto the skin every 24 hours 30 patch 0     omeprazole (PRILOSEC) 20 MG CR capsule Take 2 capsules (40 mg) by mouth daily 60 capsule 0     traMADol (ULTRAM) 50 MG tablet Take 1 tablet (50 mg) by mouth every 6 hours as needed for pain 12 tablet 0     omeprazole (PRILOSEC) 20 MG CR capsule Take 2 capsules (40 mg) by mouth daily 60 capsule 0     sucralfate (CARAFATE) 1 GM tablet Take 1 tablet (1 g) by mouth 4 times daily 40 tablet 1     oxyCODONE (ROXICODONE) 5 MG IR tablet Take 1 tablet (5 mg) by mouth every 6 hours as needed for pain 20 tablet 0     ibuprofen (ADVIL/MOTRIN) 200 MG tablet Take 2 tablets (400 mg) by mouth every 8 hours as needed for pain 30 tablet 0     Calcium Carbonate-Simethicone (ROLAIDS PLUS GAS RELIEF EXT ST) 1177-80 MG CHEW Chew 2 tabs tid prn abdomen pain or bloat 84 tablet 1     tadalafil (CIALIS) 20 MG tablet Take 1 tablet (20 mg) by mouth daily as needed for erectile dysfunction 30 tablet 0     GENVOYA [phone] MG PO TABS TAKE ONE TABLET BY MOUTH EVERY DAY (Patient taking differently: TAKE ONE TABLET BY MOUTH EVERY DAY (at bedtime)) 30 tablet 11     simethicone (GAS RELIEF) 80 MG chewable tablet Take 1 tablet (80 mg) by mouth every 6 hours as needed for flatulence or cramping 30 tablet 0     ranitidine (ZANTAC) 150 MG tablet Take 1 tablet (150 mg) by mouth 2 times daily as needed for heartburn 60 tablet 1     METFORMIN HCL PO Take 1,000 mg by mouth 2 times daily (with meals)       AZITHROMYCIN PO Take 600 mg by mouth once a week       blood glucose (NO BRAND SPECIFIED) lancets standard Use to test blood sugar four times daily  or as directed. 100 Box 5     blood glucose monitoring (NO BRAND SPECIFIED) meter device kit Use to test blood sugar four times daily or as directed. 1 kit 0     blood glucose monitoring (NO BRAND SPECIFIED) test strip Use to test blood sugars four times daily or as directed 100 each 5     sulfamethoxazole-trimethoprim (BACTRIM DS,SEPTRA DS) 800-160 MG per tablet Take 1 tablet by mouth 2 times daily           ALLERGIES: Milk and Tylenol    PAST MEDICAL HX:   Past Medical History   Diagnosis Date     Allergic rhinitis due to other allergen      DNS     Diabetes type 2, controlled (H)      HIV (human immunodeficiency virus infection) (H)      AIDS (H)      CNS toxoplasmosis (H)      Sleep apnea      doesn't use CPAP     GERD (gastroesophageal reflux disease)      Chronic abdominal pain      Periungual wart        PAST SURGICAL HX:   Past Surgical History   Procedure Laterality Date     C nonspecific procedure       right forearm fracture     Hc explore undesc testis,inguin/scrotal       Optical tracking system craniotomy, excise tumor, combined Left 4/10/2015     Procedure: COMBINED OPTICAL TRACKING SYSTEM CRANIOTOMY, EXCISE TUMOR;  Surgeon: Mirlande Colmenares MD;  Location: [facility] OR     Colonoscopy Left 1/22/2016     Procedure: COMBINED COLONOSCOPY, SINGLE OR MULTIPLE BIOPSY/POLYPECTOMY BY BIOPSY;  Surgeon: Clark Saini MD;  Location: [facility] GI     Laparoscopy diagnostic (general) N/A 7/26/2016     Procedure: LAPAROSCOPY DIAGNOSTIC (GENERAL);  Surgeon: Susannah Arriaga MD;  Location: [facility] OR     Repair ligament ulnar collateral thumb (gamekeeper's) Right 12/2/2016     Procedure: REPAIR LIGAMENT ULNAR COLLATERAL THUMB (GAMEKEEPER'S);  Surgeon: Evin Zamorano MD;  [facility] OR       IMMUNIZATION HX:   Immunization History   Administered Date(s) Administered     Hepatitis B 03/10/2016, 06/16/2016     Influenza (IIV3) 10/17/2016     Influenza Vaccine IM 3yrs+ 4 Valent IIV4 12/22/2015     Mantoux  04/12/2015     Meningococcal (Menactra ) 08/31/2015, 03/10/2016     Pneumococcal (PCV 13) 03/10/2016     Pneumococcal 23 valent 06/16/2016     TDAP (BOOSTRIX AGES 10-64) 10/17/2016       SOCIAL HX:   Social History     Social History Narrative    Born in [facility].  [place] the [place] in [place].  Last traveled to visit family in 2008.            ROS:   5-point ROS negative except otherwise noted in HPI, including constitutional, HEENT, CV, Resp, and GI.    OBJECTIVE:  /82 mmHg  Pulse 84  Resp 18  Wt 83.371 kg (183 lb 12.8 oz)  SpO2 97%   Wt Readings from Last 1 Encounters:   01/13/17 83.371 kg (183 lb 12.8 oz)     Constitutional: no distress, comfortable, pleasant, well-groomed  Skin: no concerning rash, no jaundice, temp normal, 7 x 7 x 5 mm soft fluid-filled vesicle on L lower back   Psychological: appropriate mood, demonstrates intact judgment and logical thought process      ASSESSMENT/PLAN:    1. Epidermal cyst  Pt arrived 10 min late for appointment, discussed unfortunately not enough time to remove cyst today and address other concerns. Will refer to derm for excision.   - DERMATOLOGY REFERRAL    2. Anxiety  Patient has not been on medication for this or tried counseling. He has minimal social support, and is open to therapy at this point.  - [facility] Referral    3. Psychophysiological insomnia  Recommended Trazodone every night for sleep; will increase dose to 100 mg if no improvement on 50 mg. Encouraged exercise of at least 30 minutes during the day. Reviewed sleep hygiene practices--no screen time 1 hr before bed, quiet dark room, and   - traZODone (DESYREL) 50 MG tablet; Take 1 tablet (50 mg) by mouth nightly as needed for sleep  Dispense: 90 tablet; Refill: 1    FOLLOW UP: In 2-3 weeks, or as needed.    VIKASH Palomo CNP

## 2017-01-13 NOTE — NURSING NOTE
"Dermatology Rooming Note    Andrew Lockwood's goals for this visit include:   Chief Complaint   Patient presents with     Derm Problem     cyst follow up, \"it's growing out very fast.\"   Shasha Torres, [facility]    "

## 2017-01-13 NOTE — MR AVS SNAPSHOT
After Visit Summary   1/13/2017    Andrew Lockwood    MRN: [id number]           Patient Information     Date Of Birth          1965        Visit Information        Provider Department      1/13/2017 10:00 AM Margareth Dean APRN CNP [facility]         Follow-ups after your visit        Your next 10 appointments already scheduled     Jan 13, 2017 10:00 AM   (Arrive by 9:45 AM)   ACUTE/CHRONIC SINGLE CONDITION with VIKASH Solis CNP   [facility] ([facility])    [address] [place] [id number] [phone]            Jan 16, 2017  4:10 PM   (Arrive by 3:55 PM)   JULIAN Extremity with Florence Shaw PT   [facility] JULIAN ([facility])    [address] [place] [id number] [phone]            Jan 18, 2017  2:00 PM   (Arrive by 1:45 PM)   JULIAN Extremity with Florence Shaw PT   [facility] JULIAN ([facility])    [address] [place] [id number] [phone]            Jan 18, 2017  4:00 PM   (Arrive by 3:45 PM)   Return Visit with [facility] Nurse   [facility] ([facility])    [address] [place] [id number] [phone]            Jan 20, 2017  2:15 PM   (Arrive by 2:00 PM)   Return Visit with Aguila Ingram MD   [facility] ([facility])    [address] [place] [id number] [phone]            Apr 25, 2017  8:00 AM   (Arrive by 7:45 AM)   New Patient Visit with Jonas Alan   Gastroenterology and IBD ([facility])    [address] [place] [id number] [phone]              Who to contact     Please call your clinic at [phone] to:    Ask questions about your  health    Make or cancel appointments    Discuss your medicines    Learn about your test results    Speak to your doctor   If you have compliments or concerns about an experience at your clinic, or if you wish to file a complaint, please contact [facility] Patient Relations at [phone] or email us at [email or web address]         Additional Information About Your Visit        MyChart Information     [employer] gives you secure access to your electronic health record. If you see a primary care provider, you can also send messages to your care team and make appointments. If you have questions, please call your primary care clinic.  If you do not have a primary care provider, please call [phone] and they will assist you.      [employer] is an electronic gateway that provides easy, online access to your medical records. With [employer], you can request a clinic appointment, read your test results, renew a prescription or communicate with your care team.     To access your existing account, please contact your [facility] or call [phone] for assistance.        Care EveryWhere ID     This is your Care EveryWhere ID. This could be used by other organizations to access your [facility] medical records  [id number]         Blood Pressure from Last 3 Encounters:   01/05/17 134/82   12/29/16 134/89   12/29/16 115/75    Weight from Last 3 Encounters:   01/05/17 179 lb 8 oz (81.421 kg)   01/03/17 175 lb (79.379 kg)   12/29/16 185 lb (83.915 kg)              Today, you had the following     No orders found for display         Today's Medication Changes          These changes are accurate as of: 1/12/17  3:16 PM.  If you have any questions, ask your nurse or doctor.               These medicines have changed or have updated prescriptions.        Dose/Directions    GENVOYA [phone] MG Tabs per tablet   This may have changed:  See the new instructions.    Used for:  Human immunodeficiency virus (HIV) disease (H)   Generic drug:  Mizgljr-Glxnc-Hareybjl-TenofAF        TAKE ONE TABLET BY MOUTH EVERY DAY   Quantity:  30 tablet   Refills:  11                Primary Care Provider Office Phone # Fax #    Sharon Carrie Rosado [phone]       [address] [address]        Thank you!     Thank you for choosing [facility]  for your care. Our goal is always to provide you with excellent care. Hearing back from our patients is one way we can continue to improve our services. Please take a few minutes to complete the written survey that you may receive in the mail after your visit with us. Thank you!             Your Updated Medication List - Protect others around you: Learn how to safely use, store and throw away your medicines at www.disposemymeds.org.          This list is accurate as of: 1/12/17  3:16 PM.  Always use your most recent med list.                   Brand Name Dispense Instructions for use    AZITHROMYCIN PO      Take 600 mg by mouth once a week       blood glucose lancets standard    no brand specified    100 Box    Use to test blood sugar four times daily or as directed.       blood glucose monitoring meter device kit    no brand specified    1 kit    Use to test blood sugar four times daily or as directed.       blood glucose monitoring test strip    no brand specified    100 each    Use to test blood sugars four times daily or as directed       Calcium Carbonate-Simethicone 1177-80 MG Chew    ROLAIDS PLUS GAS RELIEF EXT ST    84 tablet    Chew 2 tabs tid prn abdomen pain or bloat       GENVOYA [phone] MG Tabs per tablet   Generic drug:  Rxcpvki-Xdmgd-Amztipzy-TenofAF     30 tablet    TAKE ONE TABLET BY MOUTH EVERY DAY       ibuprofen 200 MG tablet    ADVIL/MOTRIN    30 tablet    Take 2 tablets (400 mg) by mouth every 8 hours as needed for pain       * METFORMIN HCL PO       Take 1,000 mg by mouth 2 times daily (with meals)       * metFORMIN 500 MG tablet    GLUCOPHAGE    120 tablet    TAKE TWO TABLETS BY MOUTH TWICE A DAY WITH MEALS       metoclopramide 10 MG tablet    REGLAN    120 tablet    Take 1 tablet (10 mg) by mouth 4 times daily (before meals and nightly)       nicotine 21 MG/24HR 24 hr patch    NICODERM CQ    30 patch    Place 1 patch onto the skin every 24 hours       * omeprazole 20 MG CR capsule    priLOSEC    60 capsule    Take 2 capsules (40 mg) by mouth daily       * omeprazole 20 MG CR capsule    priLOSEC    60 capsule    Take 2 capsules (40 mg) by mouth daily       oxyCODONE 5 MG IR tablet    ROXICODONE    20 tablet    Take 1 tablet (5 mg) by mouth every 6 hours as needed for pain       ranitidine 150 MG tablet    ZANTAC    60 tablet    Take 1 tablet (150 mg) by mouth 2 times daily as needed for heartburn       simethicone 80 MG chewable tablet    GAS RELIEF    30 tablet    Take 1 tablet (80 mg) by mouth every 6 hours as needed for flatulence or cramping       sucralfate 1 GM tablet    CARAFATE    40 tablet    Take 1 tablet (1 g) by mouth 4 times daily       sulfamethoxazole-trimethoprim 800-160 MG per tablet    BACTRIM DS/SEPTRA DS     Take 1 tablet by mouth 2 times daily       tadalafil 20 MG tablet    CIALIS    30 tablet    Take 1 tablet (20 mg) by mouth daily as needed for erectile dysfunction       traMADol 50 MG tablet    ULTRAM    12 tablet    Take 1 tablet (50 mg) by mouth every 6 hours as needed for pain       * Notice:  This list has 4 medication(s) that are the same as other medications prescribed for you. Read the directions carefully, and ask your doctor or other care provider to review them with you.

## 2017-01-13 NOTE — PATIENT INSTRUCTIONS
Wound Care After a Biopsy    What is a skin biopsy?  A skin biopsy allows the doctor to examine a very small piece of tissue under the microscope to determine the diagnosis and the best treatment for the skin condition. A local anesthetic (numbing medicine)  is injected with a very small needle into the skin area to be tested. A small piece of skin is taken from the area. Sometimes a suture (stitch) is used.     What are the risks of a skin biopsy?  I will experience scar, bleeding, swelling, pain, crusting and redness. I may experience incomplete removal or recurrence. Risks of this procedure are excessive bleeding, bruising, infection, nerve damage, numbness, thick (hypertrophic or keloidal) scar and non-diagnostic biopsy.    How should I care for my wound for the first 24 hours?    Keep the wound dry and covered for 24 hours    If it bleeds, hold direct pressure on the area for 15 minutes. If bleeding does not stop then go to the emergency room    Avoid strenuous exercise the first 1-2 days or as your doctor instructs you    How should I care for the wound after 24 hours?    After 24 hours, remove the bandage    You may bathe or shower as normal    If you had a scalp biopsy, you can shampoo as usual and can use shower water to clean the biopsy site daily    Clean the wound twice a day with gentle soap and water    Do not scrub, be gentle    Apply white petroleum/Vaseline after cleaning the wound with a cotton swab or a clean finger, and keep the site covered with a Bandaid /bandage. Bandages are not necessary with a scalp biopsy    If you are unable to cover the site with a Bandaid /bandage, re-apply ointment 2-3 times a day to keep the site moist. Moisture will help with healing    Avoid strenuous activity for first 1-2 days    Avoid lakes, rivers, pools, and oceans until the stitches are removed or the site is healed    How do I clean my wound?    Wash hands for 15 minutes with soap or use hand  before  all wound care    Clean the wound with gentle soap and water    Apply white petroleum/Vaseline  to wound after it is clean    Replace the Bandaid /bandage to keep the wound covered for the first few days or as instructed by your doctor    If you had a scalp biopsy, warm shower water to the area on a daily basis should suffice    What should I use to clean my wound?     Cotton-tipped applicators (Qtips )    White petroleum jelly (Vaseline ). Use a clean new container and use Q-tips to apply.    Bandaids   as needed    Gentle soap     How should I care for my wound long term?    Do not get your wound dirty    Keep up with wound care for one week or until the area is healed.    A small scab will form and fall off by itself when the area is completely healed. The area will be red and will become pink in color as it heals. Sun protection is very important for how your scar will turn out. Sunscreen with an SPF 30 or greater is recommended once the area is healed.    If you have stitches, stitches need to be removed in 14 days. You may return to our clinic for this or you may have it done locally at your doctor s office.    You should have some soreness but it should be mild and slowly go away over several days. Talk to your doctor about using tylenol for pain,    When should I call my doctor?  If you have increased:     Pain or swelling    Pus or drainage (clear or slightly yellow drainage is ok)    Temperature over 100F    Spreading redness or warmth around wound    When will I hear about my results?  The biopsy results can take 2-3 weeks to come back. The clinic will call you with the results, send you a [employer] message, or have you schedule a follow-up clinic or phone time to discuss the results. Contact our clinics if you do not hear from us in 3 weeks.     Who should I call with questions?    [facility]: [phone]     [facility]: [phone]    For  urgent needs outside of business hours call the [facility] at [phone] and ask for the dermatology resident on call

## 2017-01-16 LAB — COPATH REPORT: NORMAL

## 2017-01-17 ENCOUNTER — CARE COORDINATION (OUTPATIENT)
Dept: ORTHOPEDICS | Facility: CLINIC | Age: 52
End: 2017-01-17

## 2017-01-17 PROBLEM — Z00.00 PREVENTATIVE HEALTH CARE: Status: ACTIVE | Noted: 2017-01-17

## 2017-01-17 NOTE — PROGRESS NOTES
[facility] - HIV Progress Note  Dr. Sharon Rosado, [facility], [facility], [address] [place] [address], Sixth Floor, [address]  [place] [address]  Patient:  Andrew Lockwood, Date of birth 11/27/1967, Medical record number [id number]  Date of Visit: Jan 5, 2017         Assessment and Recommendations:     Human immunodeficiency virus (HIV) disease (H)  Continue Genvoya.  At present he continues Bactrim both as secondary prophylaxis for Toxoplasmosis and for primary PCP prophylaxis.  Safety and surrogate marker testing not indicated today.  However, we will check these at his next appointment.     CNS toxoplasmosis (H)  Continue secondary prophylaxis for toxoplasmosis.  He just reached a CD4 count of 200 in Nov 2015.  If he continues to maintain this level for the next 6 months, I will consider discontinuing this.       Type 2 diabetes mellitus (H)  I counseled Mr. Lockwood again about adherence to a diabetic diet because he continues to have high sugar food.  I will defer medical management to Dr. Lamb.     Preventative health care  Cardiovascular Juan Jose -               Lipids - CHOL 180, LDL 72 (8/18/2016)              Blood Pressure - /82 mmHg today  Cancer Screening              Colon - Up to date.  [facility]  Immunizations              Hepatitis A - immune, serology 4/13/15              Hepatitis B - 6/16/2016, 3/10/2016, will do third at next appointment              Influenza - Up To date              Pneumovax/Prevnar - Up to date               Tdap - 6/8/14 per [facility]              Menactra - Up to date.    Renal Health -  UA without proteinuria and creatinine normal in 12/2016.    Sexually Transmitted Infection Risk and Screening              Gonorrhea and Chlamydia - GC/chlamydia negative on 6/2016              Syphilis - negative on 6/2016    Sharon Rosado MD  Division of Infectious Diseases and  International Medicine  [phone]         History of Infectious Disease Illness:   Mr. Lockwood is a 51 year old man who I follow for HIV. He comes in for routine follow up.  Today we discussed his ongoing frequent visits to the ED, almost always for similar chronic issues - abdominal pain most frequently that I suspect is gas. I encouraged him to contact us or his PCP for care and I counseled him that opioid based pain management is not appropriate for this.  He again requested a script for Viagra today for erectile dysfunction.  I had recently given him a script that would have provided him will sufficient pills for over a year, so I advised him that I could not refill this.  He reports that his hand warts are much improved.  He is doing well with respect to his HIV medications with good adherence.  No issue with vomiting or diarrhea at this time.  Of note, Mr. Lockwood has recently moved into [facility].  He has not been liking it because he is not getting along with one of the staff members there.  He also does not like that they take some of his benefit to pay for living costs there.  Keesha Montes and I discussed this with him at some length because he is considering leaving and I think that this is not a good decision for his health compared to his recent situation of homelessness.          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 They did not live together.  Historical use of ARVs: Triumeq, Genvoya  Historical Resistance Mutations: Susceptible        Past Medical and Surgical History:     Past Medical History   Diagnosis Date      Allergic rhinitis due to other allergen      DNS     Diabetes type 2, controlled (H)      HIV (human immunodeficiency virus infection) (H)      AIDS (H)      CNS toxoplasmosis (H)      Sleep apnea      doesn't use CPAP     GERD (gastroesophageal reflux disease)      Chronic abdominal pain      Periungual wart        Past Surgical History   Procedure Laterality Date     C nonspecific procedure       right forearm fracture     Hc explore undesc testis,inguin/scrotal       Optical tracking system craniotomy, excise tumor, combined Left 4/10/2015     Procedure: COMBINED OPTICAL TRACKING SYSTEM CRANIOTOMY, EXCISE TUMOR;  Surgeon: Mirlande Colmenares MD;  Location: [facility] OR     Colonoscopy Left 1/22/2016     Procedure: COMBINED COLONOSCOPY, SINGLE OR MULTIPLE BIOPSY/POLYPECTOMY BY BIOPSY;  Surgeon: Clark Saini MD;  Location: [facility] GI     Laparoscopy diagnostic (general) N/A 7/26/2016     Procedure: LAPAROSCOPY DIAGNOSTIC (GENERAL);  Surgeon: Susannah Arriaga MD;  Location: [facility] OR     Repair ligament ulnar collateral thumb (gamekeeper's) Right 12/2/2016     Procedure: REPAIR LIGAMENT ULNAR COLLATERAL THUMB (GAMEKEEPER'S);  Surgeon: Evin Zamorano MD;  Location: [facility] OR           Family History:     Family History   Problem Relation Age of Onset     DIABETES Brother      DIABETES Father      CANCER No family hx of      no skin cancer     Skin Cancer No family hx of      no famiy hx of skin cancer     Unknown/Adopted Mother      DIABETES Paternal Grandfather      Alzheimer Disease Father            Social History:     Social History   Substance Use Topics     Smoking status: Current Every Day Smoker -- 0.25 packs/day     Last Attempt to Quit: 10/28/2016     Smokeless tobacco: Former User      Comment: just smoked very rarely/socailly in the past     Alcohol Use: No      Comment: Last etoh in 2007     Social History     Social History Narrative    Born in [facility].  [place]ame to the [place] in 1993.  Last traveled  to visit family in 2008.                 Review of Systems:   CONSTITUTIONAL:  No fevers or chills  RESP: No cough, shortness of breath  CV: No chest Pain, no palpitations  GI: No nausea, vomiting or diarrhea. + abdominal pain - intermittent.  Recent visits to ED, responsive to antacids  INTEGUMENT:  Positive for periungual verruca, improving  NEURO:  Negative for headache         Current Medications:     Current Outpatient Prescriptions   Medication Sig Dispense Refill     metoclopramide (REGLAN) 10 MG tablet Take 1 tablet (10 mg) by mouth 4 times daily (before meals and nightly) 120 tablet 2     nicotine (NICODERM CQ) 21 MG/24HR 24 hr patch Place 1 patch onto the skin every 24 hours 30 patch 0     omeprazole (PRILOSEC) 20 MG CR capsule Take 2 capsules (40 mg) by mouth daily 60 capsule 0     traMADol (ULTRAM) 50 MG tablet Take 1 tablet (50 mg) by mouth every 6 hours as needed for pain 12 tablet 0     omeprazole (PRILOSEC) 20 MG CR capsule Take 2 capsules (40 mg) by mouth daily 60 capsule 0     sucralfate (CARAFATE) 1 GM tablet Take 1 tablet (1 g) by mouth 4 times daily 40 tablet 1     oxyCODONE (ROXICODONE) 5 MG IR tablet Take 1 tablet (5 mg) by mouth every 6 hours as needed for pain 20 tablet 0     ibuprofen (ADVIL/MOTRIN) 200 MG tablet Take 2 tablets (400 mg) by mouth every 8 hours as needed for pain 30 tablet 0     Calcium Carbonate-Simethicone (ROLAIDS PLUS GAS RELIEF EXT ST) 1177-80 MG CHEW Chew 2 tabs tid prn abdomen pain or bloat 84 tablet 1     tadalafil (CIALIS) 20 MG tablet Take 1 tablet (20 mg) by mouth daily as needed for erectile dysfunction 30 tablet 0     GENVOYA [id number] MG PO TABS TAKE ONE TABLET BY MOUTH EVERY DAY (Patient taking differently: TAKE ONE TABLET BY MOUTH EVERY DAY (at bedtime)) 30 tablet 11     simethicone (GAS RELIEF) 80 MG chewable tablet Take 1 tablet (80 mg) by mouth every 6 hours as needed for flatulence or cramping 30 tablet 0     ranitidine (ZANTAC) 150 MG tablet Take 1  "tablet (150 mg) by mouth 2 times daily as needed for heartburn 60 tablet 1     METFORMIN HCL PO Take 1,000 mg by mouth 2 times daily (with meals)       AZITHROMYCIN PO Take 600 mg by mouth once a week       blood glucose (NO BRAND SPECIFIED) lancets standard Use to test blood sugar four times daily or as directed. 100 Box 5     blood glucose monitoring (NO BRAND SPECIFIED) meter device kit Use to test blood sugar four times daily or as directed. 1 kit 0     blood glucose monitoring (NO BRAND SPECIFIED) test strip Use to test blood sugars four times daily or as directed 100 each 5     sulfamethoxazole-trimethoprim (BACTRIM DS,SEPTRA DS) 800-160 MG per tablet Take 1 tablet by mouth 2 times daily       traZODone (DESYREL) 50 MG tablet Take 1 tablet (50 mg) by mouth nightly as needed for sleep 90 tablet 1     metFORMIN (GLUCOPHAGE) 500 MG tablet TAKE TWO TABLETS BY MOUTH TWICE A DAY WITH MEALS 120 tablet 3            Immunization History:     Immunization History   Administered Date(s) Administered     Hepatitis B 03/10/2016, 06/16/2016     Influenza (IIV3) 10/17/2016     Influenza Vaccine IM 3yrs+ 4 Valent IIV4 12/22/2015     Mantoux 04/12/2015     Meningococcal (Menactra ) 08/31/2015, 03/10/2016     Pneumococcal (PCV 13) 03/10/2016     Pneumococcal 23 valent 06/16/2016     TDAP (BOOSTRIX AGES 10-64) 10/17/2016            Allergies:     Allergies   Allergen Reactions     Milk [Lac Bovis] Hives     Tylenol [Acetaminophen] Itching            Physical Exam:   /82 mmHg  Pulse 61  Temp(Src) 98.7  F (37.1  C) (Oral)  Ht 1.626 m (5' 4\")  Wt 81.421 kg (179 lb 8 oz)  BMI 30.80 kg/m2    Physical Examination:  GENERAL:  well-developed, seated in no acute distress  HEENT:  Head is normocephalic, atraumatic   EYES:  Eyes have anicteric sclerae without conjunctival injection   RESP: BCTA, No W/R/R  CV: RRR no M/R/G  ABD: S NTND  SKIN: No systemic rash  NEUROLOGIC:  Grossly non-focal.           Laboratory Data:     CD4 " Count  ABSOLUTE CD4   Date Value Ref Range Status   11/07/2016 200* 441 - 2156 cells/uL Final     CD4 HELPER T   Date Value Ref Range Status   11/07/2016 7* 28 - 63 % Final     CD4:CD8 RATIO   Date Value Ref Range Status   11/07/2016 0.25* 1.40 - 2.60 Final       HIV-1 RNA Quantitative:  HIV-1 RNA QUANT RESULT   Date Value Ref Range Status   11/07/2016  HIVND [Copies]/mL Final    HIV-1 RNA Not Detected   The DAMON AmpliPrep/DAMON TaqMan HIV-1 test is an FDA-approved in vitro nucleic   acid amplification test for the quantitation of HIV-1 RNA in human plasma (EDTA   plasma) using the DAMON AmpliPrep instrument for automated viral nucleic acid   extraction and the DAMON TaqMan Analyzer or DAMON TaqMan for automated Real   Time PCR amplification and detection of the viral nucleic acid target.   Titer results are reported in copies/ml. This assay is intended for use in   conjunction with clinical presentation and other laboratory markers of disease   prognosis and for use as an aid in assessing viral response to antiretroviral   treatment as measured by changes in plasma HIV-1 RNA levels. This test should   not be used as a donor screening test to confirm the presence of HIV-1   infection.         Metabolic Studies       SODIUM   Date Value Ref Range Status   12/28/2016 141 133 - 144 mmol/L Final   12/26/2016 138 133 - 144 mmol/L Final     POTASSIUM   Date Value Ref Range Status   12/28/2016 3.6 3.4 - 5.3 mmol/L Final   12/26/2016 3.4 3.4 - 5.3 mmol/L Final     CHLORIDE   Date Value Ref Range Status   12/28/2016 106 94 - 109 mmol/L Final   12/26/2016 106 94 - 109 mmol/L Final     CARBON DIOXIDE   Date Value Ref Range Status   12/28/2016 27 20 - 32 mmol/L Final   12/26/2016 23 20 - 32 mmol/L Final     ANION GAP   Date Value Ref Range Status   12/28/2016 8 3 - 14 mmol/L Final   12/26/2016 9 3 - 14 mmol/L Final     UREA NITROGEN   Date Value Ref Range Status   12/28/2016 11 7 - 30 mg/dL Final   12/26/2016 17 7 - 30 mg/dL  Final     CREATININE   Date Value Ref Range Status   12/28/2016 0.89 0.66 - 1.25 mg/dL Final   12/26/2016 1.01 0.66 - 1.25 mg/dL Final     GFR ESTIMATE   Date Value Ref Range Status   12/28/2016 90 >60 mL/min/1.7m2 Final     Comment:     Non  GFR Calc   12/26/2016 78 >60 mL/min/1.7m2 Final     Comment:     Non  GFR Calc     GLUCOSE   Date Value Ref Range Status   12/28/2016 108* 70 - 99 mg/dL Final   12/26/2016 111* 70 - 99 mg/dL Final     HEMOGLOBIN A1C   Date Value Ref Range Status   08/18/2016 7.0* 4.3 - 6.0 % Final     CALCIUM   Date Value Ref Range Status   12/28/2016 8.9 8.5 - 10.1 mg/dL Final   12/26/2016 8.9 8.5 - 10.1 mg/dL Final     PHOSPHORUS   Date Value Ref Range Status   12/26/2016 2.9 2.5 - 4.5 mg/dL Final     MAGNESIUM   Date Value Ref Range Status   12/28/2016 2.0 1.6 - 2.3 mg/dL Final   12/26/2016 1.9 1.6 - 2.3 mg/dL Final     LACTIC ACID   Date Value Ref Range Status   12/26/2016 1.6 0.7 - 2.1 mmol/L Final   11/06/2016 1.5 0.7 - 2.1 mmol/L Final       Hepatic Studies    BILIRUBIN TOTAL   Date Value Ref Range Status   12/28/2016 0.4 0.2 - 1.3 mg/dL Final   12/26/2016 0.3 0.2 - 1.3 mg/dL Final     ALKALINE PHOSPHATASE   Date Value Ref Range Status   12/28/2016 82 40 - 150 U/L Final   12/26/2016 80 40 - 150 U/L Final     ALBUMIN   Date Value Ref Range Status   12/28/2016 3.8 3.4 - 5.0 g/dL Final   12/26/2016 4.1 3.4 - 5.0 g/dL Final     AST   Date Value Ref Range Status   12/28/2016 17 0 - 45 U/L Final   12/26/2016 19 0 - 45 U/L Final     ALT   Date Value Ref Range Status   12/28/2016 22 0 - 70 U/L Final   12/26/2016 31 0 - 70 U/L Final       Hematology Studies      WBC   Date Value Ref Range Status   12/28/2016 6.1 4.0 - 11.0 10e9/L Final   12/26/2016 10.9 4.0 - 11.0 10e9/L Final     ABSOLUTE NEUTROPHIL   Date Value Ref Range Status   12/28/2016 3.0 1.6 - 8.3 10e9/L Final   12/26/2016 7.6 1.6 - 8.3 10e9/L Final     ABSOLUTE LYMPHOCYTES   Date Value Ref Range Status    12/28/2016 2.5 0.8 - 5.3 10e9/L Final   12/26/2016 2.4 0.8 - 5.3 10e9/L Final     ABSOLUTE MONOCYTES   Date Value Ref Range Status   12/28/2016 0.5 0.0 - 1.3 10e9/L Final   12/26/2016 0.7 0.0 - 1.3 10e9/L Final     ABSOLUTE EOSINOPHILS   Date Value Ref Range Status   12/28/2016 0.1 0.0 - 0.7 10e9/L Final   12/26/2016 0.1 0.0 - 0.7 10e9/L Final     HEMOGLOBIN   Date Value Ref Range Status   12/28/2016 13.7 13.3 - 17.7 g/dL Final   12/26/2016 14.3 13.3 - 17.7 g/dL Final     HEMATOCRIT   Date Value Ref Range Status   12/28/2016 41.6 40.0 - 53.0 % Final   12/26/2016 42.3 40.0 - 53.0 % Final     PLATELET COUNT   Date Value Ref Range Status   12/28/2016 213 150 - 450 10e9/L Final   12/26/2016 250 150 - 450 10e9/L Final       Hepatitis B Testing     Recent Labs   Lab Test  04/13/15   0816   HBCAB  Nonreactive   HEPBANG  Nonreactive   HBCM  Nonreactive   A nonreactive result suggests lack of recent exposure to the virus in the   preceding 6 months.     HBEABY  Negative  Reference range: Negative  (Note)  Performed by [facility],  [address] [place] [address],[place] [address] [phone]  [email or web address] [facility], Nayan Corley MD, Lab. Director     HBEAGN  Negative  Reference range: Negative  (Note)  Performed by [facility],  [address] [place] [id number] [phone]  [email or web address], Nayan Corley MD, Lab. Director         Hepatitis C Testing     HEPATITIS C ANTIBODY   Date Value Ref Range Status   04/13/2015  NR Final    Nonreactive   Assay performance characteristics have not been established for newborns,   infants, and children       Imaging:  Recent Results (from the past 744 hour(s))   Fingers XR, 2-3 views, right    Narrative     XR FINGER RT G/E 2 VW  12/22/2016 2:26 AM      HISTORY: thumb pain    COMPARISON: 11/18/2016    FINDINGS: PA, oblique, lateral views of the left hallux. Casting  material moderately limiting the evaluation of the osseous structures.  Normal bony alignment. No fractures are visualized. No  worrisome  osseous lesions.      Impression    IMPRESSION: Study limited by overlying casting material however no  acute osseous abnormalities are seen.    I have personally reviewed the examination and initial interpretation  and I agree with the findings.    ZENY LEONG MD   US Abdomen Limited    Narrative    EXAMINATION: Limited Abdominal Ultrasound, 12/28/2016 4:06 PM     COMPARISON: CT 11/6/2016    HISTORY: Upper abdominal pain; evaluate for gallstones.    FINDINGS:   Fluid: No evidence of ascites or pleural effusions.    Liver: The liver demonstrates normal echotexture, measuring 15.3 cm in  craniocaudal dimension. There is no focal mass. Likely right hepatic  lobe cyst seen on prior CT is not well-visualized on the current exam.    Gallbladder: The gallbladder is partially contracted. There is no wall  thickening, pericholecystic fluid, positive sonographic Mendes's sign  or evidence for cholelithiasis.    Bile Ducts: No intrahepatic biliary ductal dilatation. The common bile  duct was not visualized.      Pancreas: Obscured by overlying bowel gas.    Kidney: The right kidney measures 11.9 cm long. There is no  hydronephrosis or mass.       Impression    IMPRESSION:   1.  Negative right upper quadrant ultrasound. No evidence of  cholelithiasis or cholecystitis.  2.  The pancreas was not visualized.    JOSUE BHATT MD   XR Knee RT 3 vw    Narrative    XR KNEE RT 3 VW 1/3/2017 5:17 PM     HISTORY:  Unspecified injury of right lower leg, initial encounter    COMPARISON: 11/22/2016.    FINDINGS: Films include standing AP, standing lateral and view of the  patellofemoral joint 45 degrees.    Question of subtle subchondral sclerosis medial tibial condyle.  Femoral tibial and patellofemoral joint spaces are normal. There is no  evidence of joint effusion. There is no evidence of patellar tilt or  subluxation.      Impression    IMPRESSION: Question of subtle subchondral sclerosis medial tibial  condyle. No  joint space narrowing.    KEIRY MENDEZ MD

## 2017-01-18 ENCOUNTER — ALLIED HEALTH/NURSE VISIT (OUTPATIENT)
Dept: ORTHOPEDICS | Facility: CLINIC | Age: 52
End: 2017-01-18

## 2017-01-18 DIAGNOSIS — Z98.890 POSTOPERATIVE STATE: Primary | ICD-10-CM

## 2017-01-18 NOTE — ASSESSMENT & PLAN NOTE
Cardiovascular Juan Jose -               Lipids - CHOL 180, LDL 72 (8/18/2016)              Blood Pressure - /82 mmHg today  Cancer Screening              Colon - Up to date.  [facility]  Immunizations              Hepatitis A - immune, serology 4/13/15              Hepatitis B - 6/16/2016, 3/10/2016, will do third at next appointment              Influenza - Up To date              Pneumovax/Prevnar - Up to date               Tdap - 6/8/14 per [facility]              Menactra - Up to date.    Renal Health -  UA without proteinuria and creatinine normal in 12/2016.    Sexually Transmitted Infection Risk and Screening              Gonorrhea and Chlamydia - GC/chlamydia negative on 6/2016              Syphilis - negative on 6/2016

## 2017-01-18 NOTE — PROGRESS NOTES
Patient came in for cast removal.  He is s/p Right thumb metacarpophalangeal joint ulnar collateral ligament repair.  He was given a prefabricated thumb spica splint to be worn.  Patient will follow up with Dr. Zamorano on 2/3/2017.  Patient has our contact information for questions/concerns.

## 2017-01-18 NOTE — ASSESSMENT & PLAN NOTE
Continue Genvoya.  At present he continues Bactrim both as secondary prophylaxis for Toxoplasmosis and for primary PCP prophylaxis.  Safety and surrogate marker testing not indicated today.  However, we will check these at his next appointment.

## 2017-01-18 NOTE — ASSESSMENT & PLAN NOTE
Continue secondary prophylaxis for toxoplasmosis.  He just reached a CD4 count of 200 in Nov 2015.  If he continues to maintain this level for the next 6 months, I will consider discontinuing this.

## 2017-01-18 NOTE — ASSESSMENT & PLAN NOTE
I counseled Mr. Lockwood again about adherence to a diabetic diet because he continues to have high sugar food.  I will defer medical management to Dr. Lamb.

## 2017-01-23 ENCOUNTER — OFFICE VISIT (OUTPATIENT)
Dept: INTERNAL MEDICINE | Facility: CLINIC | Age: 52
End: 2017-01-23

## 2017-01-23 VITALS
WEIGHT: 175 LBS | DIASTOLIC BLOOD PRESSURE: 87 MMHG | BODY MASS INDEX: 30.02 KG/M2 | HEART RATE: 99 BPM | SYSTOLIC BLOOD PRESSURE: 129 MMHG

## 2017-01-23 DIAGNOSIS — F41.1 GAD (GENERALIZED ANXIETY DISORDER): Primary | ICD-10-CM

## 2017-01-23 DIAGNOSIS — E11.9 TYPE 2 DIABETES MELLITUS WITHOUT COMPLICATION, WITHOUT LONG-TERM CURRENT USE OF INSULIN (H): ICD-10-CM

## 2017-01-23 LAB — HBA1C MFR BLD: 6.4 % (ref 4.3–6)

## 2017-01-23 RX ORDER — ESCITALOPRAM OXALATE 10 MG/1
10 TABLET ORAL DAILY
Qty: 30 TABLET | Refills: 1 | Status: SHIPPED | OUTPATIENT
Start: 2017-01-23 | End: 2017-02-11

## 2017-01-23 ASSESSMENT — ANXIETY QUESTIONNAIRES
2. NOT BEING ABLE TO STOP OR CONTROL WORRYING: NEARLY EVERY DAY
1. FEELING NERVOUS, ANXIOUS, OR ON EDGE: NEARLY EVERY DAY
5. BEING SO RESTLESS THAT IT IS HARD TO SIT STILL: NEARLY EVERY DAY
IF YOU CHECKED OFF ANY PROBLEMS ON THIS QUESTIONNAIRE, HOW DIFFICULT HAVE THESE PROBLEMS MADE IT FOR YOU TO DO YOUR WORK, TAKE CARE OF THINGS AT HOME, OR GET ALONG WITH OTHER PEOPLE: VERY DIFFICULT
7. FEELING AFRAID AS IF SOMETHING AWFUL MIGHT HAPPEN: NEARLY EVERY DAY
GAD7 TOTAL SCORE: 20
3. WORRYING TOO MUCH ABOUT DIFFERENT THINGS: MORE THAN HALF THE DAYS
6. BECOMING EASILY ANNOYED OR IRRITABLE: NEARLY EVERY DAY

## 2017-01-23 ASSESSMENT — PAIN SCALES - GENERAL: PAINLEVEL: MILD PAIN (2)

## 2017-01-23 ASSESSMENT — PATIENT HEALTH QUESTIONNAIRE - PHQ9: 5. POOR APPETITE OR OVEREATING: NEARLY EVERY DAY

## 2017-01-23 NOTE — NURSING NOTE
Chief Complaint   Patient presents with     Anxiety     patient states he is here being seen for anxiety     TRISH THORNTON at 12:10 PM on 1/23/2017.

## 2017-01-23 NOTE — PATIENT INSTRUCTIONS
[facility] Medication Refill Request Information:  * Please contact your pharmacy regarding ANY request for medication refills.  ** [facility] Prescription Fax = [phone]  * Please allow 3 business days for routine medication refills.  * Please allow 5 business days for controlled substance medication refills.     [facility] Test Result notification information:  *You will be notified with in 7-10 days of your appointment day regarding the results of your test.  If you are on MyChart you will be notified as soon as the provider has reviewed the results and signed off on them.    General resources in case you are feeling increasingly depressed and/or suicidal:    Call 911 or go directly to an Emergency Room (such as [facility] or [facility]) if you need help immediately    Here are some hotline options:    [place] Residents:  CRISIS CONNECTION [phone]  Or [phone]      [facility], [facility], [phone]      CRISIS PROGRAM, [facility],  [phone]      [facility]  [phone])      [facility]  [phone] (2-901-ROUOKBK)    Health Psychology (Dr. Osbaldo Panchal) [phone] (4th Floor [facility])   Health Psychology (Dr. Mirtha Drew) [phone] (4th [facility])   Health Psychology (Dr. Rosemarie Alejandra) [phone] (4th Floor [facility])   Health Psychology (Dr. Arlene De Souza) [phone] (4th [facility])  Health Psychology (Dr. Elizabeth Nevarez) [phone] (4th [facility])

## 2017-01-23 NOTE — MR AVS SNAPSHOT
After Visit Summary   1/23/2017    Andrew Lockwood    MRN: [id number]           Patient Information     Date Of Birth          1965        Visit Information        Provider Department      1/23/2017 12:00 PM Margareth Dean APRN [facility]        Today's Diagnoses     DAVID (generalized anxiety disorder)    -  1     Type 2 diabetes mellitus without complication, without long-term current use of insulin (H)           Care Instructions    Primary Care Center Medication Refill Request Information:  * Please contact your pharmacy regarding ANY request for medication refills.  ** [facility] Prescription Fax = [phone]  * Please allow 3 business days for routine medication refills.  * Please allow 5 business days for controlled substance medication refills.     Primary Care Center Test Result notification information:  *You will be notified with in 7-10 days of your appointment day regarding the results of your test.  If you are on MyChart you will be notified as soon as the provider has reviewed the results and signed off on them.    General resources in case you are feeling increasingly depressed and/or suicidal:    Call 911 or go directly to an Emergency Room (such as [facility] or [facility]) if you need help immediately    Here are some hotline options:    [place] Residents:  CRISIS CONNECTION [phone]  Or [phone]      CRISIS INTERVENTION CENTER, [facility], [phone]      CRISIS PROGRAM, [facility],  [phone]      NATIONAL SUICIDE PREVENTION [facility]INE  [phone])      NATIONAL HOPELINE NETWORK  [phone] (9-250-JVFVGND)    Health Psychology (Dr. Osbaldo Panchal) [phone] (4th Floor [facility])   Health Psychology (Dr. Mirtha Drew) [phone] (4th Floor [facility])   Health Psychology (Dr. Rosemarie Alejandra) [phone] (4th Floor  [facility])   Health Psychology (Dr. Arlene De Souza) [phone] ([address] [place] [address])  Health Psychology (Dr. Elizabeth Nevarez) [phone] ([address] [place] [address])                Follow-ups after your visit        Your next 10 appointments already scheduled     Jan 23, 2017  1:00 PM   LAB with  LAB    Health Lab ([facility])    [address] [place] [address]  [address] [facility] [id number] [phone]           Patient must bring picture ID.  Patient should be prepared to give a urine specimen  Please do not eat 10-12 hours before your appointment if you are coming in fasting for labs on lipids, cholesterol, or glucose (sugar).  Pregnant women should follow their Care Team instructions. Water with medications is okay. Do not drink coffee or other fluids.   If you have concerns about taking  your medications, please ask at office or if scheduling via [employer], send a message by clicking on Secure Messaging, Message Your Care Team.            Jan 27, 2017  9:00 AM   Nurse Visit with  Dermatology Nurse   [facility] Dermatology ([facility])    [address] [place] [address]  [address] [facility] [address] [phone]            Feb 03, 2017 10:20 AM   (Arrive by 10:05 AM)   RETURN HAND with Evin Zamorano MD   [facility] Orthopaedic Clinic ([facility])    [address] [place] [id number] [phone]            Apr 25, 2017  8:00 AM   (Arrive by 7:45 AM)   New Patient Visit with Jonas Alan   [facility] Gastroenterology and IBD ([facility])    [address] [place] [id number] [phone]              Future tests that were ordered for you today     Open Future Orders        Priority Expected Expires Ordered    HEMOGLOBIN A1C -(Today) Routine 1/23/2017 1/23/2018 1/23/2017            Who to contact     Please call your clinic at [phone] to:    Ask  questions about your health    Make or cancel appointments    Discuss your medicines    Learn about your test results    Speak to your doctor   If you have compliments or concerns about an experience at your clinic, or if you wish to file a complaint, please contact [facility] Patient Relations at [phone] or email us at [email or web address]         Additional Information About Your Visit        MyChart Information     [employer] gives you secure access to your electronic health record. If you see a primary care provider, you can also send messages to your care team and make appointments. If you have questions, please call your primary care clinic.  If you do not have a primary care provider, please call [phone] and they will assist you.      [employer] is an electronic gateway that provides easy, online access to your medical records. With [employer], you can request a clinic appointment, read your test results, renew a prescription or communicate with your care team.     To access your existing account, please contact your [facility] or call [phone] for assistance.        Care EveryWhere ID     This is your Care EveryWhere ID. This could be used by other organizations to access your [facility] medical records  [id number]        Your Vitals Were     Pulse                   99            Blood Pressure from Last 3 Encounters:   01/23/17 129/87   01/13/17 122/82   01/05/17 134/82    Weight from Last 3 Encounters:   01/23/17 79.379 kg (175 lb)   01/13/17 83.371 kg (183 lb 12.8 oz)   01/05/17 81.421 kg (179 lb 8 oz)                 Today's Medication Changes          These changes are accurate as of: 1/23/17 12:39 PM.  If you have any questions, ask your nurse or doctor.               Start taking these medicines.        Dose/Directions    escitalopram 10 MG tablet   Commonly known as:  LEXAPRO   Used for:  DAVID (generalized anxiety disorder)    Started by:  Margareth Dean APRN CNP        Dose:  10 mg   Take 1 tablet (10 mg) by mouth daily   Quantity:  30 tablet   Refills:  1         These medicines have changed or have updated prescriptions.        Dose/Directions    GENVOYA [phone] MG Tabs per tablet   This may have changed:  See the new instructions.   Used for:  Human immunodeficiency virus (HIV) disease (H)   Generic drug:  Ssmjvyj-Wgjfg-Yddlljek-TenofAF        TAKE ONE TABLET BY MOUTH EVERY DAY   Quantity:  30 tablet   Refills:  11       metFORMIN 500 MG tablet   Commonly known as:  GLUCOPHAGE   This may have changed:  Another medication with the same name was removed. Continue taking this medication, and follow the directions you see here.   Used for:  DM (diabetes mellitus) (H)   Changed by:  Sharon Rosado MD        TAKE TWO TABLETS BY MOUTH TWICE A DAY WITH MEALS   Quantity:  120 tablet   Refills:  3       omeprazole 20 MG CR capsule   Commonly known as:  priLOSEC   This may have changed:  Another medication with the same name was removed. Continue taking this medication, and follow the directions you see here.        Dose:  40 mg   Take 2 capsules (40 mg) by mouth daily   Quantity:  60 capsule   Refills:  0         Stop taking these medicines if you haven't already. Please contact your care team if you have questions.     oxyCODONE 5 MG IR tablet   Commonly known as:  ROXICODONE   Stopped by:  Margareth Dean APRN CNP                Where to get your medicines      These medications were sent to [facility] [address]  [address], [facility] [address]    Hours:  TRANSPLANT PHONE NUMBER [phone] Phone:  [phone]    - escitalopram 10 MG tablet             Primary Care Provider Office Phone # Fax #    Sharon Rosado [phone]       [address] [facility] [address]  [facility] [address]         Thank you!     Thank you for choosing [facility]  for your care. Our goal is always to provide you with excellent care. Hearing back from our patients is one way we can continue to improve our services. Please take a few minutes to complete the written survey that you may receive in the mail after your visit with us. Thank you!             Your Updated Medication List - Protect others around you: Learn how to safely use, store and throw away your medicines at www.disposemymeds.org.          This list is accurate as of: 1/23/17 12:39 PM.  Always use your most recent med list.                   Brand Name Dispense Instructions for use    AZITHROMYCIN PO      Take 600 mg by mouth once a week       blood glucose lancets standard    no brand specified    100 Box    Use to test blood sugar four times daily or as directed.       blood glucose monitoring meter device kit    no brand specified    1 kit    Use to test blood sugar four times daily or as directed.       blood glucose monitoring test strip    no brand specified    100 each    Use to test blood sugars four times daily or as directed       Calcium Carbonate-Simethicone 1177-80 MG Chew    ROLAIDS PLUS GAS RELIEF EXT ST    84 tablet    Chew 2 tabs tid prn abdomen pain or bloat       escitalopram 10 MG tablet    LEXAPRO    30 tablet    Take 1 tablet (10 mg) by mouth daily       GENVOYA [phone] MG Tabs per tablet   Generic drug:  Gqdszlu-Zzzhu-Brhbfyii-TenofAF     30 tablet    TAKE ONE TABLET BY MOUTH EVERY DAY       metFORMIN 500 MG tablet    GLUCOPHAGE    120 tablet    TAKE TWO TABLETS BY MOUTH TWICE A DAY WITH MEALS       metoclopramide 10 MG tablet    REGLAN    120 tablet    Take 1 tablet (10 mg) by mouth 4 times daily (before meals and nightly)       nicotine 21 MG/24HR 24 hr patch    NICODERM CQ    30 patch    Place 1 patch onto the skin every 24 hours       omeprazole 20 MG CR capsule    priLOSEC    60 capsule    Take 2 capsules  (40 mg) by mouth daily       ranitidine 150 MG tablet    ZANTAC    60 tablet    Take 1 tablet (150 mg) by mouth 2 times daily as needed for heartburn       simethicone 80 MG chewable tablet    GAS RELIEF    30 tablet    Take 1 tablet (80 mg) by mouth every 6 hours as needed for flatulence or cramping       sucralfate 1 GM tablet    CARAFATE    40 tablet    Take 1 tablet (1 g) by mouth 4 times daily       sulfamethoxazole-trimethoprim 800-160 MG per tablet    BACTRIM DS/SEPTRA DS     Take 1 tablet by mouth 2 times daily       tadalafil 20 MG tablet    CIALIS    30 tablet    Take 1 tablet (20 mg) by mouth daily as needed for erectile dysfunction       traMADol 50 MG tablet    ULTRAM    12 tablet    Take 1 tablet (50 mg) by mouth every 6 hours as needed for pain

## 2017-01-24 ASSESSMENT — ANXIETY QUESTIONNAIRES: GAD7 TOTAL SCORE: 20

## 2017-01-27 ENCOUNTER — ALLIED HEALTH/NURSE VISIT (OUTPATIENT)
Dept: DERMATOLOGY | Facility: CLINIC | Age: 52
End: 2017-01-27

## 2017-01-27 DIAGNOSIS — Z48.02 VISIT FOR SUTURE REMOVAL: Primary | ICD-10-CM

## 2017-01-27 NOTE — NURSING NOTE
Removed 5 sutures, from lower right back without complication. Small amount of vaseline placed over site and covered with a band-aid for patient to keep on for 24 hours. Patient tolerated procedure well, and understood all followup instructions.     Jacklyn Mathew RN

## 2017-01-30 ENCOUNTER — HOSPITAL ENCOUNTER (EMERGENCY)
Facility: CLINIC | Age: 52
Discharge: HOME | End: 2017-01-31
Attending: EMERGENCY MEDICINE | Admitting: EMERGENCY MEDICINE
Payer: MEDICAID

## 2017-01-30 DIAGNOSIS — R11.2 NON-INTRACTABLE VOMITING WITH NAUSEA, UNSPECIFIED VOMITING TYPE: ICD-10-CM

## 2017-01-30 LAB
ALBUMIN SERPL-MCNC: 3.9 G/DL (ref 3.4–5)
ALP SERPL-CCNC: 101 U/L (ref 40–150)
ALT SERPL W P-5'-P-CCNC: 27 U/L (ref 0–70)
ANION GAP SERPL CALCULATED.3IONS-SCNC: 7 MMOL/L (ref 3–14)
AST SERPL W P-5'-P-CCNC: ABNORMAL U/L (ref 0–45)
BILIRUB SERPL-MCNC: 0.5 MG/DL (ref 0.2–1.3)
BUN SERPL-MCNC: 13 MG/DL (ref 7–30)
CALCIUM SERPL-MCNC: 9.3 MG/DL (ref 8.5–10.1)
CHLORIDE SERPL-SCNC: 100 MMOL/L (ref 94–109)
CO2 SERPL-SCNC: 30 MMOL/L (ref 20–32)
CREAT SERPL-MCNC: 0.89 MG/DL (ref 0.66–1.25)
DIFFERENTIAL METHOD BLD: NORMAL
ERYTHROCYTE [DISTWIDTH] IN BLOOD BY AUTOMATED COUNT: NORMAL % (ref 10–15)
GFR SERPL CREATININE-BSD FRML MDRD: 89 ML/MIN/1.7M2
GLUCOSE SERPL-MCNC: 125 MG/DL (ref 70–99)
HCT VFR BLD AUTO: NORMAL % (ref 40–53)
HGB BLD-MCNC: NORMAL G/DL (ref 13.3–17.7)
LIPASE SERPL-CCNC: 249 U/L (ref 73–393)
MCH RBC QN AUTO: NORMAL PG (ref 26.5–33)
MCHC RBC AUTO-ENTMCNC: NORMAL G/DL (ref 31.5–36.5)
MCV RBC AUTO: NORMAL FL (ref 78–100)
PLATELET # BLD AUTO: NORMAL 10E9/L (ref 150–450)
POTASSIUM SERPL-SCNC: 4.6 MMOL/L (ref 3.4–5.3)
PROT SERPL-MCNC: 7.8 G/DL (ref 6.8–8.8)
RBC # BLD AUTO: NORMAL 10E12/L (ref 4.4–5.9)
SODIUM SERPL-SCNC: 137 MMOL/L (ref 133–144)
WBC # BLD AUTO: NORMAL 10E9/L (ref 4–11)

## 2017-01-30 PROCEDURE — 99285 EMERGENCY DEPT VISIT HI MDM: CPT | Mod: Z6 | Performed by: EMERGENCY MEDICINE

## 2017-01-30 PROCEDURE — 99285 EMERGENCY DEPT VISIT HI MDM: CPT | Mod: 25 | Performed by: EMERGENCY MEDICINE

## 2017-01-30 PROCEDURE — 80053 COMPREHEN METABOLIC PANEL: CPT | Performed by: EMERGENCY MEDICINE

## 2017-01-30 PROCEDURE — 85025 COMPLETE CBC W/AUTO DIFF WBC: CPT | Performed by: EMERGENCY MEDICINE

## 2017-01-30 PROCEDURE — 25000128 H RX IP 250 OP 636: Performed by: EMERGENCY MEDICINE

## 2017-01-30 PROCEDURE — 96374 THER/PROPH/DIAG INJ IV PUSH: CPT | Performed by: EMERGENCY MEDICINE

## 2017-01-30 PROCEDURE — 96361 HYDRATE IV INFUSION ADD-ON: CPT | Performed by: EMERGENCY MEDICINE

## 2017-01-30 PROCEDURE — 83690 ASSAY OF LIPASE: CPT | Performed by: EMERGENCY MEDICINE

## 2017-01-30 RX ORDER — MORPHINE SULFATE 4 MG/ML
4 INJECTION, SOLUTION INTRAMUSCULAR; INTRAVENOUS
Status: DISCONTINUED | OUTPATIENT
Start: 2017-01-30 | End: 2017-01-31 | Stop reason: HOSPADM

## 2017-01-30 RX ORDER — ONDANSETRON 2 MG/ML
4 INJECTION INTRAMUSCULAR; INTRAVENOUS EVERY 30 MIN PRN
Status: DISCONTINUED | OUTPATIENT
Start: 2017-01-30 | End: 2017-01-31 | Stop reason: HOSPADM

## 2017-01-30 RX ORDER — SODIUM CHLORIDE 9 MG/ML
1000 INJECTION, SOLUTION INTRAVENOUS CONTINUOUS
Status: DISCONTINUED | OUTPATIENT
Start: 2017-01-30 | End: 2017-01-31 | Stop reason: HOSPADM

## 2017-01-30 RX ADMIN — SODIUM CHLORIDE 1000 ML: 9 INJECTION, SOLUTION INTRAVENOUS at 20:52

## 2017-01-30 RX ADMIN — SODIUM CHLORIDE 1000 ML: 9 INJECTION, SOLUTION INTRAVENOUS at 21:38

## 2017-01-30 RX ADMIN — MORPHINE SULFATE 4 MG: 4 INJECTION, SOLUTION INTRAMUSCULAR; INTRAVENOUS at 21:15

## 2017-01-30 ASSESSMENT — ENCOUNTER SYMPTOMS
ABDOMINAL PAIN: 1
CONSTIPATION: 1
NAUSEA: 1
VOMITING: 1

## 2017-01-30 NOTE — ED AVS SNAPSHOT
[facility], [place], Emergency Department    [address] [place] [address]    Phone:  [phone]                                       Andrew Lockwood   MRN: [id number]    Department:  [facility], Emergency Department   Date of Visit:  1/30/2017           After Visit Summary Signature Page     I have received my discharge instructions, and my questions have been answered. I have discussed any challenges I see with this plan with the nurse or doctor.    ..........................................................................................................................................  Patient/Patient Representative Signature      ..........................................................................................................................................  Patient Representative Print Name and Relationship to Patient    ..................................................               ................................................  Date                                            Time    ..........................................................................................................................................  Reviewed by Signature/Title    ...................................................              ..............................................  Date                                                            Time

## 2017-01-30 NOTE — ED AVS SNAPSHOT
[facility], Emergency Department    [address] [facility] [address]    Phone:  [phone]                                       Andrew Lockwood   MRN: [id number]    Department:  [facility], Emergency Department   Date of Visit:  1/30/2017           Patient Information     Date Of Birth          1965        Your diagnoses for this visit were:     Non-intractable vomiting with nausea, unspecified vomiting type        You were seen by Thanh Lehman MD.        Discharge Instructions       Your labs are normal, follow-up with your doctor    Future Appointments        Provider Department Dept Phone Center    2/3/2017 10:20 AM Evin Zamorano MD [facility] [phone] [facility]    4/25/2017 8:00 AM Jonas Alan [facility] and [phone] [facility]      24 Hour Appointment Hotline       To make an appointment at any [facility], call [phone] ([phone]). If you don't have a family doctor or clinic, we will help you find one. [facility] are conveniently located to serve the needs of you and your family.             Review of your medicines      Our records show that you are taking the medicines listed below. If these are incorrect, please call your family doctor or clinic.        Dose / Directions Last dose taken    AZITHROMYCIN PO   Dose:  600 mg        Take 600 mg by mouth once a week   Refills:  0        blood glucose lancets standard   Commonly known as:  no brand specified   Quantity:  100 Box        Use to test blood sugar four times daily or as directed.   Refills:  5        blood glucose monitoring meter device kit   Commonly known as:  no brand specified   Quantity:  1 kit        Use to test blood sugar four times daily or as directed.   Refills:  0        blood glucose monitoring test strip   Commonly known as:  no brand specified   Quantity:  100 each        Use to test blood sugars four times daily or as directed    Refills:  5        Calcium Carbonate-Simethicone 1177-80 MG Chew   Commonly known as:  ROLAIDS PLUS GAS RELIEF EXT ST   Quantity:  84 tablet        Chew 2 tabs tid prn abdomen pain or bloat   Refills:  1        escitalopram 10 MG tablet   Commonly known as:  LEXAPRO   Dose:  10 mg   Quantity:  30 tablet        Take 1 tablet (10 mg) by mouth daily   Refills:  1        GENVOYA [phone] MG Tabs per tablet   Quantity:  30 tablet   Generic drug:  Jrilqyb-Zgosq-Xunfptns-TenofAF        TAKE ONE TABLET BY MOUTH EVERY DAY   Refills:  11        metFORMIN 500 MG tablet   Commonly known as:  GLUCOPHAGE   Quantity:  120 tablet        TAKE TWO TABLETS BY MOUTH TWICE A DAY WITH MEALS   Refills:  3        metoclopramide 10 MG tablet   Commonly known as:  REGLAN   Dose:  10 mg   Quantity:  120 tablet        Take 1 tablet (10 mg) by mouth 4 times daily (before meals and nightly)   Refills:  2        nicotine 21 MG/24HR 24 hr patch   Commonly known as:  NICODERM CQ   Dose:  1 patch   Quantity:  30 patch        Place 1 patch onto the skin every 24 hours   Refills:  0        ranitidine 150 MG tablet   Commonly known as:  ZANTAC   Dose:  150 mg   Quantity:  60 tablet        Take 1 tablet (150 mg) by mouth 2 times daily as needed for heartburn   Refills:  1        simethicone 80 MG chewable tablet   Commonly known as:  GAS RELIEF   Dose:  80 mg   Quantity:  30 tablet        Take 1 tablet (80 mg) by mouth every 6 hours as needed for flatulence or cramping   Refills:  0        sucralfate 1 GM tablet   Commonly known as:  CARAFATE   Dose:  1 g   Quantity:  40 tablet        Take 1 tablet (1 g) by mouth 4 times daily   Refills:  1        sulfamethoxazole-trimethoprim 800-160 MG per tablet   Commonly known as:  BACTRIM DS/SEPTRA DS   Dose:  1 tablet        Take 1 tablet by mouth 2 times daily   Refills:  0        tadalafil 20 MG tablet   Commonly known as:  CIALIS   Dose:  20 mg   Quantity:  30 tablet        Take 1 tablet (20 mg) by  mouth daily as needed for erectile dysfunction   Refills:  0        traMADol 50 MG tablet   Commonly known as:  ULTRAM   Dose:  50 mg   Quantity:  12 tablet        Take 1 tablet (50 mg) by mouth every 6 hours as needed for pain   Refills:  0                Procedures and tests performed during your visit     CBC with platelets differential    Comprehensive metabolic panel    Lipase      Orders Needing Specimen Collection     None      Pending Results     No orders found for last 2 day(s).            Pending Culture Results     No orders found for last 2 day(s).            Thank you for choosing [facility]       Thank you for choosing [facility] for your care. Our goal is always to provide you with excellent care. Hearing back from our patients is one way we can continue to improve our services. Please take a few minutes to complete the written survey that you may receive in the mail after you visit with us. Thank you!        [employer] Information     [employer] gives you secure access to your electronic health record. If you see a primary care provider, you can also send messages to your care team and make appointments. If you have questions, please call your primary care clinic.  If you do not have a primary care provider, please call [phone] and they will assist you.        Care EveryWhere ID     This is your Care EveryWhere ID. This could be used by other organizations to access your [facility] medical records  [id number]        After Visit Summary       This is your record. Keep this with you and show to your community pharmacist(s) and doctor(s) at your next visit.

## 2017-01-31 ENCOUNTER — OFFICE VISIT (OUTPATIENT)
Dept: FAMILY MEDICINE | Facility: CLINIC | Age: 52
End: 2017-01-31

## 2017-01-31 VITALS
OXYGEN SATURATION: 98 % | HEART RATE: 73 BPM | TEMPERATURE: 98.1 F | HEIGHT: 64 IN | BODY MASS INDEX: 29.02 KG/M2 | SYSTOLIC BLOOD PRESSURE: 128 MMHG | RESPIRATION RATE: 16 BRPM | WEIGHT: 170 LBS | DIASTOLIC BLOOD PRESSURE: 84 MMHG

## 2017-01-31 VITALS
HEART RATE: 81 BPM | BODY MASS INDEX: 31.22 KG/M2 | TEMPERATURE: 99 F | WEIGHT: 182 LBS | RESPIRATION RATE: 18 BRPM | DIASTOLIC BLOOD PRESSURE: 88 MMHG | SYSTOLIC BLOOD PRESSURE: 130 MMHG

## 2017-01-31 DIAGNOSIS — R14.1 FLATULENCE, ERUCTATION, AND GAS PAIN: Primary | ICD-10-CM

## 2017-01-31 DIAGNOSIS — K21.9 GASTROESOPHAGEAL REFLUX DISEASE, ESOPHAGITIS PRESENCE NOT SPECIFIED: ICD-10-CM

## 2017-01-31 DIAGNOSIS — R14.3 FLATULENCE, ERUCTATION, AND GAS PAIN: Primary | ICD-10-CM

## 2017-01-31 DIAGNOSIS — R14.2 FLATULENCE, ERUCTATION, AND GAS PAIN: Primary | ICD-10-CM

## 2017-01-31 PROCEDURE — 25000132 ZZH RX MED GY IP 250 OP 250 PS 637: Performed by: EMERGENCY MEDICINE

## 2017-01-31 PROCEDURE — 96376 TX/PRO/DX INJ SAME DRUG ADON: CPT | Performed by: EMERGENCY MEDICINE

## 2017-01-31 PROCEDURE — 25000128 H RX IP 250 OP 636: Performed by: EMERGENCY MEDICINE

## 2017-01-31 PROCEDURE — 25000125 ZZHC RX 250: Performed by: EMERGENCY MEDICINE

## 2017-01-31 RX ORDER — HYOSCYAMINE SULFATE 0.125 MG
0.125-0.25 TABLET ORAL EVERY 4 HOURS PRN
Qty: 40 TABLET | Refills: 1 | Status: SHIPPED | OUTPATIENT
Start: 2017-01-31 | End: 2017-05-09

## 2017-01-31 RX ADMIN — MORPHINE SULFATE 4 MG: 4 INJECTION, SOLUTION INTRAMUSCULAR; INTRAVENOUS at 01:10

## 2017-01-31 RX ADMIN — SODIUM CHLORIDE 1000 ML: 9 INJECTION, SOLUTION INTRAVENOUS at 01:10

## 2017-01-31 RX ADMIN — LIDOCAINE HYDROCHLORIDE 30 ML: 20 SOLUTION ORAL; TOPICAL at 00:18

## 2017-01-31 ASSESSMENT — PAIN SCALES - GENERAL: PAINLEVEL: EXTREME PAIN (9)

## 2017-01-31 NOTE — PATIENT INSTRUCTIONS
Primary Care Center Medication Refill Request Information:  * Please contact your pharmacy regarding ANY request for medication refills.  ** [facility] Prescription Fax = [phone]  * Please allow 3 business days for routine medication refills.  * Please allow 5 business days for controlled substance medication refills.     Primary Care Center Test Result notification information:  *You will be notified with in 7-10 days of your appointment day regarding the results of your test.  If you are on MyChart you will be notified as soon as the provider has reviewed the results and signed off on them.

## 2017-01-31 NOTE — ED PROVIDER NOTES
History     Chief Complaint   Patient presents with     Abdominal Pain     Nausea & Vomiting     HPI  Andrew Lockwood is a 51 year old male with a history of HIV/AIDS (on Genvoya), CNS toxoplasmosis, type II diabetes, GERD, chronic constipation and chronic abdominal pain who presents to the emergency department today with complaints of abdominal cramping, nausea and vomiting. Patient reports developing abdominal cramping late last night around 2 AM. He also reports constipation, nausea and vomiting. He attributes his symptoms to his HIV medication, Genvoya. He states he has had similar symptoms in the past and has been admitted for this.     I have reviewed the Medications, Allergies, Past Medical and Surgical History, and Social History in the [employer] system.    Past Medical History   Diagnosis Date     Allergic rhinitis due to other allergen      DNS     Diabetes type 2, controlled (H)      HIV (human immunodeficiency virus infection) (H)      AIDS (H)      CNS toxoplasmosis (H)      Sleep apnea      doesn't use CPAP     GERD (gastroesophageal reflux disease)      Chronic abdominal pain      Periungual wart        Past Surgical History   Procedure Laterality Date     C nonspecific procedure       right forearm fracture     Hc explore undesc testis,inguin/scrotal       Optical tracking system craniotomy, excise tumor, combined Left 4/10/2015     Procedure: COMBINED OPTICAL TRACKING SYSTEM CRANIOTOMY, EXCISE TUMOR;  Surgeon: Mirlande Colmenares MD;  Location: [facility] OR     Colonoscopy Left 1/22/2016     Procedure: COMBINED COLONOSCOPY, SINGLE OR MULTIPLE BIOPSY/POLYPECTOMY BY BIOPSY;  Surgeon: Clark Saini MD;  Location: [facility] GI     Laparoscopy diagnostic (general) N/A 7/26/2016     Procedure: LAPAROSCOPY DIAGNOSTIC (GENERAL);  Surgeon: Susannah Arriaga MD;  Location: [facility] OR     Repair ligament ulnar collateral thumb (gamekeeper's) Right 12/2/2016     Procedure: REPAIR LIGAMENT ULNAR COLLATERAL THUMB  (GAMEKEANDRE'S);  Surgeon: Evin Zamorano MD;  [facility]       Family History   Problem Relation Age of Onset     DIABETES Brother      DIABETES Father      CANCER No family hx of      no skin cancer     Skin Cancer No family hx of      no famiy hx of skin cancer     Unknown/Adopted Mother      DIABETES Paternal Grandfather      Alzheimer Disease Father        Social History   Substance Use Topics     Smoking status: Current Every Day Smoker -- 0.25 packs/day     Last Attempt to Quit: 10/28/2016     Smokeless tobacco: Former User      Comment: just smoked very rarely/socailly in the past     Alcohol Use: No      Comment: Last etoh in 2007     No current facility-administered medications for this encounter.     Current Outpatient Prescriptions   Medication     ranitidine (ZANTAC) 150 MG tablet     hyoscyamine (ANASPAZ/LEVSIN) 0.125 MG tablet     escitalopram (LEXAPRO) 10 MG tablet     metFORMIN (GLUCOPHAGE) 500 MG tablet     metoclopramide (REGLAN) 10 MG tablet     nicotine (NICODERM CQ) 21 MG/24HR 24 hr patch     traMADol (ULTRAM) 50 MG tablet     sucralfate (CARAFATE) 1 GM tablet     Calcium Carbonate-Simethicone (ROLAIDS PLUS GAS RELIEF EXT ST) 1177-80 MG CHEW     tadalafil (CIALIS) 20 MG tablet     GENVOYA [phone] MG PO TABS     simethicone (GAS RELIEF) 80 MG chewable tablet     AZITHROMYCIN PO     blood glucose (NO BRAND SPECIFIED) lancets standard     blood glucose monitoring (NO BRAND SPECIFIED) meter device kit     blood glucose monitoring (NO BRAND SPECIFIED) test strip     sulfamethoxazole-trimethoprim (BACTRIM DS,SEPTRA DS) 800-160 MG per tablet        Allergies   Allergen Reactions     Milk [Lac Bovis] Hives     Tylenol [Acetaminophen] Itching       Review of Systems   Constitutional: Positive for appetite change. Negative for fever.   Respiratory: Negative.    Cardiovascular: Negative.    Gastrointestinal: Positive for nausea, vomiting, abdominal pain (cramping) and constipation.  "  Genitourinary: Negative.    All other systems reviewed and are negative.      Physical Exam   BP: (!) 143/92 mmHg  Pulse: 98  Resp: 12  Height: 162.6 cm (5' 4\")  Weight: 78.019 kg (172 lb)  SpO2: 99 %  Physical Exam   Constitutional: He is oriented to person, place, and time. He appears well-developed and well-nourished. No distress.   HENT:   Head: Normocephalic and atraumatic.   Eyes: Conjunctivae and EOM are normal. Pupils are equal, round, and reactive to light. No scleral icterus.   Neck: Neck supple.   Cardiovascular: Normal rate, regular rhythm and normal heart sounds.    Pulmonary/Chest: Effort normal and breath sounds normal. No respiratory distress. He has no wheezes.   Abdominal: Soft. He exhibits no distension. There is no tenderness.   Neurological: He is alert and oriented to person, place, and time. No cranial nerve deficit.   Skin: Skin is warm and dry.   Psychiatric: He has a normal mood and affect. His behavior is normal.   Nursing note and vitals reviewed.      ED Course     Procedures   8:44 PM  The patient was seen and examined by Dr. Lehman in Room ED15.   Medications   0.9% sodium chloride BOLUS (0 mLs Intravenous Stopped 1/30/17 2231)     Followed by   0.9% sodium chloride BOLUS (0 mLs Intravenous Stopped 1/30/17 2138)   lidocaine (XYLOCAINE) 2 % 15 mL, alum & mag hydroxide-simethicone (MYLANTA ES/MAALOX  ES) 15 mL GI Cocktail (30 mLs Oral Given 1/31/17 0018)         No results found for this or any previous visit (from the past 24 hour(s)).  Assessments & Plan (with Medical Decision Making)   51-year-old male with nausea, vomiting, and epigastric discomfort.  Here his labs were all normal. His abdominal exam was not concerning. I do not think this represents biliary colic or pancreatitis.  Feels better after a GI cocktail, no evidence for bleeding.  We ll discharge him home and recommend follow-up with his primary care provider.    This part of the document was transcribed by Yadira " Karyn Medical Scribe.      I have reviewed the nursing notes.    I have reviewed the findings, diagnosis, plan and need for follow up with the patient.    Discharge Medication List as of 1/31/2017  1:27 AM          Final diagnoses:   Non-intractable vomiting with nausea, unspecified vomiting type   I, Dali Mathew, am serving as a trained medical scribe to document services personally performed by Thanh Lehman MD, based on the provider's statements to me.      I, Thanh Lehman MD, was physically present and have reviewed and verified the accuracy of this note documented by Dali Mathew.       1/30/2017   [facility], [place], EMERGENCY DEPARTMENT      Thanh Lehman MD  02/02/17 8155

## 2017-01-31 NOTE — NURSING NOTE
Chief Complaint   Patient presents with     Hospital F/U     Patient is here to follow up with hospital visit at [facility]     Felisa Blanca CMA 10:45 AM on 1/31/2017.

## 2017-01-31 NOTE — PROGRESS NOTES
SUBJECTIVE:    Pt is a 51 year old male with pmh of     Patient Active Problem List   Diagnosis     Allergic rhinitis due to other allergen     Brain lesion     Toxoplasma encephalitis (H)     Pulmonary nodules     Human immunodeficiency virus (HIV) disease (H)     Folliculitis     Prurigo nodularis     Epidermal cyst     Shoulder joint pain, unspecified laterality     CNS toxoplasmosis (H)     Constipation     Non-intractable vomiting with nausea, vomiting of unspecified type     Thrush     Insomnia, unspecified insomnia     Bowel obstruction (H)     Toxoplasmosis     Gastroesophageal reflux disease     Headache     Aphthous ulcer     Type 2 diabetes mellitus (H)     Small bowel obstruction (H)     SBO (small bowel obstruction) (H)     Slow transit constipation     Periungual wart     Herpes zoster     Erectile dysfunction, unspecified erectile dysfunction type     Insomnia, unspecified type     Preventative health care       who is here for evaluation of had concerns including Hospital F/U.    Here for abd pain  Per pt started several years ago when started current HIV med regimen, but after he discussed w/ ID clinic they decided he should cont current HIV meds  Extensive w/u rvwd  C/o pain, diffuse, wax/wane, certain foods do seem to trigger predicatbly mainly certain types of produce he does try to avoid  Gets pain and bloat.   Occasional vomit too.  He has gone back and forth from Zantac to PPI, he thinks both help partially. When in ER, just was, notes rvwd, gets GI cocktail, he states that helps.   No diarrhea or constipation.   His weight is not down.  Appetite intact.  Has not had study for SIBO, nor UGI/SBFT.  Sees GI in the spring    Has not tried Bentyl or Levsin    Allergies   Allergen Reactions     Milk [Lac Bovis] Hives     Tylenol [Acetaminophen] Itching     Past Medical History   Diagnosis Date     Allergic rhinitis due to other allergen      DNS     Diabetes type 2, controlled (H)      HIV (human  immunodeficiency virus infection) (H)      AIDS (H)      CNS toxoplasmosis (H)      Sleep apnea      doesn't use CPAP     GERD (gastroesophageal reflux disease)      Chronic abdominal pain      Periungual wart      Past Surgical History   Procedure Laterality Date     C nonspecific procedure       right forearm fracture     Hc explore undesc testis,inguin/scrotal       Optical tracking system craniotomy, excise tumor, combined Left 4/10/2015     Procedure: COMBINED OPTICAL TRACKING SYSTEM CRANIOTOMY, EXCISE TUMOR;  Surgeon: Mirlande Colmenares MD;  [facility]     Colonoscopy Left 1/22/2016     Procedure: COMBINED COLONOSCOPY, SINGLE OR MULTIPLE BIOPSY/POLYPECTOMY BY BIOPSY;  Surgeon: Clark Saini MD;  Location: [facility] GI     Laparoscopy diagnostic (general) N/A 7/26/2016     Procedure: LAPAROSCOPY DIAGNOSTIC (GENERAL);  Surgeon: Susannah Arriaga MD;  Location: [facility] OR     Repair ligament ulnar collateral thumb (gamekeeper's) Right 12/2/2016     Procedure: REPAIR LIGAMENT ULNAR COLLATERAL THUMB (GAMEKEEPER'S);  Surgeon: Evin Zamorano MD;  [facility] OR           Current Outpatient Prescriptions   Medication Sig Dispense Refill     ranitidine (ZANTAC) 150 MG tablet Take 1 tablet (150 mg) by mouth 2 times daily as needed for heartburn 60 tablet 1     hyoscyamine (ANASPAZ/LEVSIN) 0.125 MG tablet Take 1-2 tablets (125-250 mcg) by mouth every 4 hours as needed for cramping 40 tablet 1     escitalopram (LEXAPRO) 10 MG tablet Take 1 tablet (10 mg) by mouth daily 30 tablet 1     metFORMIN (GLUCOPHAGE) 500 MG tablet TAKE TWO TABLETS BY MOUTH TWICE A DAY WITH MEALS 120 tablet 3     metoclopramide (REGLAN) 10 MG tablet Take 1 tablet (10 mg) by mouth 4 times daily (before meals and nightly) 120 tablet 2     nicotine (NICODERM CQ) 21 MG/24HR 24 hr patch Place 1 patch onto the skin every 24 hours 30 patch 0     traMADol (ULTRAM) 50 MG tablet Take 1 tablet (50 mg) by mouth every 6 hours as needed for pain 12  tablet 0     sucralfate (CARAFATE) 1 GM tablet Take 1 tablet (1 g) by mouth 4 times daily 40 tablet 1     Calcium Carbonate-Simethicone (ROLAIDS PLUS GAS RELIEF EXT ST) 1177-80 MG CHEW Chew 2 tabs tid prn abdomen pain or bloat 84 tablet 1     tadalafil (CIALIS) 20 MG tablet Take 1 tablet (20 mg) by mouth daily as needed for erectile dysfunction 30 tablet 0     GENVOYA [phone] MG PO TABS TAKE ONE TABLET BY MOUTH EVERY DAY (Patient taking differently: TAKE ONE TABLET BY MOUTH EVERY DAY (at bedtime)) 30 tablet 11     simethicone (GAS RELIEF) 80 MG chewable tablet Take 1 tablet (80 mg) by mouth every 6 hours as needed for flatulence or cramping 30 tablet 0     AZITHROMYCIN PO Take 600 mg by mouth once a week       blood glucose (NO BRAND SPECIFIED) lancets standard Use to test blood sugar four times daily or as directed. 100 Box 5     blood glucose monitoring (NO BRAND SPECIFIED) meter device kit Use to test blood sugar four times daily or as directed. 1 kit 0     blood glucose monitoring (NO BRAND SPECIFIED) test strip Use to test blood sugars four times daily or as directed 100 each 5     sulfamethoxazole-trimethoprim (BACTRIM DS,SEPTRA DS) 800-160 MG per tablet Take 1 tablet by mouth 2 times daily         Social History   Substance Use Topics     Smoking status: Current Every Day Smoker -- 0.25 packs/day     Last Attempt to Quit: 10/28/2016     Smokeless tobacco: Former User      Comment: just smoked very rarely/socailly in the past     Alcohol Use: No      Comment: Last etoh in 2007           OBJECTIVE:  /88 mmHg  Pulse 81  Temp(Src) 99  F (37.2  C) (Oral)  Resp 18  Wt 82.555 kg (182 lb)  GENERAL APPEARANCE: Alert, no acute distress  ABDOMEN: soft, no organomegaly, masses or tenderness  NEURO: Alert, oriented, speech and mentation normal  PSYCHE: mentation appears normal, affect and mood normal    ASSESSMENT/PLAN:    Wax/wane diffuse abd pain several years  UGI/SBFT  SIBO test  Then see me back  In  meantime cont Prilosec (per pt taking daily) and also Zantac  Also prn Levsin  Cont to avoid foods that trigger    ALEJANDRINA COTE MD

## 2017-02-02 ASSESSMENT — ENCOUNTER SYMPTOMS
APPETITE CHANGE: 1
FEVER: 0
CARDIOVASCULAR NEGATIVE: 1
RESPIRATORY NEGATIVE: 1

## 2017-02-11 ENCOUNTER — HOSPITAL ENCOUNTER (EMERGENCY)
Facility: CLINIC | Age: 52
Discharge: HOME | End: 2017-02-11
Attending: FAMILY MEDICINE | Admitting: FAMILY MEDICINE
Payer: COMMERCIAL

## 2017-02-11 ENCOUNTER — APPOINTMENT (OUTPATIENT)
Dept: GENERAL RADIOLOGY | Facility: CLINIC | Age: 52
End: 2017-02-11
Attending: FAMILY MEDICINE
Payer: COMMERCIAL

## 2017-02-11 VITALS
RESPIRATION RATE: 16 BRPM | BODY MASS INDEX: 29.17 KG/M2 | OXYGEN SATURATION: 100 % | WEIGHT: 170 LBS | DIASTOLIC BLOOD PRESSURE: 85 MMHG | SYSTOLIC BLOOD PRESSURE: 132 MMHG | TEMPERATURE: 97.8 F | HEART RATE: 90 BPM

## 2017-02-11 DIAGNOSIS — R10.13 ABDOMINAL PAIN, EPIGASTRIC: ICD-10-CM

## 2017-02-11 DIAGNOSIS — K21.9 GASTROESOPHAGEAL REFLUX DISEASE WITHOUT ESOPHAGITIS: ICD-10-CM

## 2017-02-11 DIAGNOSIS — B20 HUMAN IMMUNODEFICIENCY VIRUS (HIV) DISEASE (H): ICD-10-CM

## 2017-02-11 LAB
ALBUMIN SERPL-MCNC: 3.9 G/DL (ref 3.4–5)
ALP SERPL-CCNC: 114 U/L (ref 40–150)
ALT SERPL W P-5'-P-CCNC: 28 U/L (ref 0–70)
ANION GAP SERPL CALCULATED.3IONS-SCNC: 10 MMOL/L (ref 3–14)
AST SERPL W P-5'-P-CCNC: 22 U/L (ref 0–45)
BASOPHILS # BLD AUTO: 0 10E9/L (ref 0–0.2)
BASOPHILS NFR BLD AUTO: 0.4 %
BILIRUB SERPL-MCNC: 0.3 MG/DL (ref 0.2–1.3)
BUN SERPL-MCNC: 10 MG/DL (ref 7–30)
CALCIUM SERPL-MCNC: 9.1 MG/DL (ref 8.5–10.1)
CHLORIDE SERPL-SCNC: 105 MMOL/L (ref 94–109)
CO2 SERPL-SCNC: 27 MMOL/L (ref 20–32)
CREAT SERPL-MCNC: 0.73 MG/DL (ref 0.66–1.25)
DIFFERENTIAL METHOD BLD: ABNORMAL
EOSINOPHIL # BLD AUTO: 0.2 10E9/L (ref 0–0.7)
EOSINOPHIL NFR BLD AUTO: 2 %
ERYTHROCYTE [DISTWIDTH] IN BLOOD BY AUTOMATED COUNT: 14.6 % (ref 10–15)
GFR SERPL CREATININE-BSD FRML MDRD: ABNORMAL ML/MIN/1.7M2
GLUCOSE SERPL-MCNC: 103 MG/DL (ref 70–99)
HCT VFR BLD AUTO: 40 % (ref 40–53)
HGB BLD-MCNC: 13.6 G/DL (ref 13.3–17.7)
IMM GRANULOCYTES # BLD: 0 10E9/L (ref 0–0.4)
IMM GRANULOCYTES NFR BLD: 0.5 %
LIPASE SERPL-CCNC: 192 U/L (ref 73–393)
LYMPHOCYTES # BLD AUTO: 2.7 10E9/L (ref 0.8–5.3)
LYMPHOCYTES NFR BLD AUTO: 33.5 %
MCH RBC QN AUTO: 31.9 PG (ref 26.5–33)
MCHC RBC AUTO-ENTMCNC: 34 G/DL (ref 31.5–36.5)
MCV RBC AUTO: 94 FL (ref 78–100)
MONOCYTES # BLD AUTO: 0.5 10E9/L (ref 0–1.3)
MONOCYTES NFR BLD AUTO: 6.5 %
NEUTROPHILS # BLD AUTO: 4.6 10E9/L (ref 1.6–8.3)
NEUTROPHILS NFR BLD AUTO: 57.1 %
NRBC # BLD AUTO: 0 10*3/UL
NRBC BLD AUTO-RTO: 0 /100
PLATELET # BLD AUTO: 212 10E9/L (ref 150–450)
POTASSIUM SERPL-SCNC: 3.2 MMOL/L (ref 3.4–5.3)
PROT SERPL-MCNC: 7.3 G/DL (ref 6.8–8.8)
RBC # BLD AUTO: 4.27 10E12/L (ref 4.4–5.9)
SODIUM SERPL-SCNC: 141 MMOL/L (ref 133–144)
WBC # BLD AUTO: 8 10E9/L (ref 4–11)

## 2017-02-11 PROCEDURE — 83690 ASSAY OF LIPASE: CPT | Performed by: FAMILY MEDICINE

## 2017-02-11 PROCEDURE — 25000132 ZZH RX MED GY IP 250 OP 250 PS 637: Performed by: FAMILY MEDICINE

## 2017-02-11 PROCEDURE — 99284 EMERGENCY DEPT VISIT MOD MDM: CPT | Mod: 25

## 2017-02-11 PROCEDURE — 80053 COMPREHEN METABOLIC PANEL: CPT | Performed by: FAMILY MEDICINE

## 2017-02-11 PROCEDURE — 96375 TX/PRO/DX INJ NEW DRUG ADDON: CPT

## 2017-02-11 PROCEDURE — 25000125 ZZHC RX 250: Performed by: FAMILY MEDICINE

## 2017-02-11 PROCEDURE — 74020 XR ABDOMEN 2 VW: CPT

## 2017-02-11 PROCEDURE — 96376 TX/PRO/DX INJ SAME DRUG ADON: CPT

## 2017-02-11 PROCEDURE — 25000128 H RX IP 250 OP 636: Performed by: FAMILY MEDICINE

## 2017-02-11 PROCEDURE — 85025 COMPLETE CBC W/AUTO DIFF WBC: CPT | Performed by: FAMILY MEDICINE

## 2017-02-11 PROCEDURE — 99284 EMERGENCY DEPT VISIT MOD MDM: CPT | Mod: Z6 | Performed by: FAMILY MEDICINE

## 2017-02-11 PROCEDURE — 96361 HYDRATE IV INFUSION ADD-ON: CPT | Mod: 59

## 2017-02-11 PROCEDURE — 96374 THER/PROPH/DIAG INJ IV PUSH: CPT

## 2017-02-11 PROCEDURE — 40000556 ZZH STATISTIC PERIPHERAL IV START W US GUIDANCE

## 2017-02-11 RX ORDER — SODIUM CHLORIDE 9 MG/ML
1000 INJECTION, SOLUTION INTRAVENOUS CONTINUOUS
Status: DISCONTINUED | OUTPATIENT
Start: 2017-02-11 | End: 2017-02-11 | Stop reason: HOSPADM

## 2017-02-11 RX ORDER — ONDANSETRON 2 MG/ML
4 INJECTION INTRAMUSCULAR; INTRAVENOUS ONCE
Status: COMPLETED | OUTPATIENT
Start: 2017-02-11 | End: 2017-02-11

## 2017-02-11 RX ORDER — KETOROLAC TROMETHAMINE 30 MG/ML
15 INJECTION, SOLUTION INTRAMUSCULAR; INTRAVENOUS ONCE
Status: COMPLETED | OUTPATIENT
Start: 2017-02-11 | End: 2017-02-11

## 2017-02-11 RX ORDER — RABEPRAZOLE SODIUM 20 MG/1
20 TABLET, DELAYED RELEASE ORAL DAILY
Qty: 30 TABLET | Refills: 1 | Status: SHIPPED | OUTPATIENT
Start: 2017-02-11 | End: 2017-05-09

## 2017-02-11 RX ADMIN — PANTOPRAZOLE SODIUM 40 MG: 40 INJECTION, POWDER, FOR SOLUTION INTRAVENOUS at 09:23

## 2017-02-11 RX ADMIN — SODIUM CHLORIDE 1000 ML: 9 INJECTION, SOLUTION INTRAVENOUS at 09:19

## 2017-02-11 RX ADMIN — LIDOCAINE HYDROCHLORIDE 30 ML: 20 SOLUTION ORAL; TOPICAL at 08:31

## 2017-02-11 RX ADMIN — SODIUM CHLORIDE 1000 ML: 9 INJECTION, SOLUTION INTRAVENOUS at 10:47

## 2017-02-11 RX ADMIN — ONDANSETRON 4 MG: 2 INJECTION INTRAMUSCULAR; INTRAVENOUS at 09:20

## 2017-02-11 RX ADMIN — KETOROLAC TROMETHAMINE 15 MG: 30 INJECTION, SOLUTION INTRAMUSCULAR at 10:46

## 2017-02-11 RX ADMIN — KETOROLAC TROMETHAMINE 15 MG: 30 INJECTION, SOLUTION INTRAMUSCULAR at 09:21

## 2017-02-11 ASSESSMENT — ENCOUNTER SYMPTOMS
EYE REDNESS: 0
SHORTNESS OF BREATH: 0
CONSTIPATION: 1
NECK STIFFNESS: 0
DIFFICULTY URINATING: 0
HEADACHES: 0
CONFUSION: 0
FEVER: 0
COLOR CHANGE: 0
ARTHRALGIAS: 0
CHILLS: 0
ABDOMINAL PAIN: 1

## 2017-02-11 NOTE — ED AVS SNAPSHOT
[facility], [place], Emergency Department    [address] [place] [id number]    Phone:  [phone]                                       Andrew Lockwood   MRN: [id number]    Department:  [facility], Emergency Department   Date of Visit:  2/11/2017           After Visit Summary Signature Page     I have received my discharge instructions, and my questions have been answered. I have discussed any challenges I see with this plan with the nurse or doctor.    ..........................................................................................................................................  Patient/Patient Representative Signature      ..........................................................................................................................................  Patient Representative Print Name and Relationship to Patient    ..................................................               ................................................  Date                                            Time    ..........................................................................................................................................  Reviewed by Signature/Title    ...................................................              ..............................................  Date                                                            Time

## 2017-02-11 NOTE — ED NOTES
Andrew presents with c/o abdominal cramping that began last night and reports several episode of emesis. Denies constipation/diarrhea, fevers or known sick contacts. History of HIV, diabetes and acid reflux.

## 2017-02-11 NOTE — ED AVS SNAPSHOT
[facility], Emergency Department    [address] [facility] [facility] [address]    Phone:  [phone]                                       Andrew Lockwood   MRN: [id number]    Department:  [facility], Emergency Department   Date of Visit:  2/11/2017           Patient Information     Date Of Birth          11/27/1967        Your diagnoses for this visit were:     Gastroesophageal reflux disease without esophagitis     Abdominal pain, epigastric        You were seen by Zain Ramírez MD.        Discharge Instructions       Please make an appointment to follow up with Your Primary Care Provider in 7 days if not improving.  Your labs are all normal and the xray of the abdomen is normal. Use the aciphex in place of the omeprazole to see if this will control the reflux symptoms.  Should you have increased abdominal pain, nausea and vomiting come back to the ER to be seen.       Future Appointments        Provider Department Dept Phone Center    3/2/2017 9:30 AM Sharon Rosado MD [facility] and Infectious Diseases [phone] [facility]    4/25/2017 8:00 AM Jonas Alan [facility] Gastroenterology and [phone] [facility]      24 Hour Appointment Hotline       To make an appointment at any [facility], call [phone] ([phone]). If you don't have a family doctor or clinic, we will help you find one. [facility] clinics are conveniently located to serve the needs of you and your family.             Review of your medicines      START taking        Dose / Directions Last dose taken    RABEprazole 20 MG EC tablet   Commonly known as:  ACIPHEX   Dose:  20 mg   Quantity:  30 tablet        Take 1 tablet (20 mg) by mouth daily   Refills:  1          Our records show that you are taking the medicines listed below. If these are incorrect, please call your family doctor or clinic.        Dose / Directions Last dose taken    AZITHROMYCIN PO   Dose:  600 mg        Take 600 mg by mouth once a  week   Refills:  0        blood glucose lancets standard   Commonly known as:  no brand specified   Quantity:  100 Box        Use to test blood sugar four times daily or as directed.   Refills:  5        blood glucose monitoring meter device kit   Commonly known as:  no brand specified   Quantity:  1 kit        Use to test blood sugar four times daily or as directed.   Refills:  0        blood glucose monitoring test strip   Commonly known as:  no brand specified   Quantity:  100 each        Use to test blood sugars four times daily or as directed   Refills:  5        GENVOYA [phone] MG Tabs per tablet   Quantity:  30 tablet   Generic drug:  Papupdw-Wrlyr-Tidgciug-TenofAF        TAKE ONE TABLET BY MOUTH EVERY DAY   Refills:  11        hyoscyamine 0.125 MG tablet   Commonly known as:  ANASPAZ/LEVSIN   Dose:  0.125-0.25 mg   Quantity:  40 tablet        Take 1-2 tablets (125-250 mcg) by mouth every 4 hours as needed for cramping   Refills:  1        metFORMIN 500 MG tablet   Commonly known as:  GLUCOPHAGE   Quantity:  120 tablet        TAKE TWO TABLETS BY MOUTH TWICE A DAY WITH MEALS   Refills:  3        PEPCID AC MAXIMUM STRENGTH PO        Refills:  0        ranitidine 150 MG tablet   Commonly known as:  ZANTAC   Dose:  150 mg   Quantity:  60 tablet        Take 1 tablet (150 mg) by mouth 2 times daily as needed for heartburn   Refills:  1        simethicone 80 MG chewable tablet   Commonly known as:  GAS RELIEF   Dose:  80 mg   Quantity:  30 tablet        Take 1 tablet (80 mg) by mouth every 6 hours as needed for flatulence or cramping   Refills:  0        sulfamethoxazole-trimethoprim 800-160 MG per tablet   Commonly known as:  BACTRIM DS/SEPTRA DS   Dose:  1 tablet        Take 1 tablet by mouth 2 times daily   Refills:  0        traMADol 50 MG tablet   Commonly known as:  ULTRAM   Dose:  50 mg   Quantity:  12 tablet        Take 1 tablet (50 mg) by mouth every 6 hours as needed for pain   Refills:  0                 Prescriptions were sent or printed at these locations (1 Prescription)                   Other Prescriptions                Printed at Department/Unit printer (1 of 1)         RABEprazole (ACIPHEX) 20 MG EC tablet                Procedures and tests performed during your visit     Abdomen XR, 2 vw, flat and upright    CBC with platelets differential    Comprehensive metabolic panel    Lipase    [facility] Adult IP Consult      Orders Needing Specimen Collection     Ordered          02/11/17 0752  UA with Microscopic reflex to Culture - STAT, Prio: STAT, Needs to be Collected     Scheduled Task Status   02/11/17 0751 Collect UA with Microscopic reflex to Culture Open   Order Class:  PCU Collect                  Pending Results     No orders found from 2/10/2017 to 2/12/2017.            Pending Culture Results     No orders found from 2/10/2017 to 2/12/2017.            Thank you for choosing [facility]       Thank you for choosing [facility] for your care. Our goal is always to provide you with excellent care. Hearing back from our patients is one way we can continue to improve our services. Please take a few minutes to complete the written survey that you may receive in the mail after you visit with us. Thank you!        [employer] Information     [employer] gives you secure access to your electronic health record. If you see a primary care provider, you can also send messages to your care team and make appointments. If you have questions, please call your primary care clinic.  If you do not have a primary care provider, please call [phone] and they will assist you.        Care EveryWhere ID     This is your Care EveryWhere ID. This could be used by other organizations to access your [facility] medical records  [id number]        After Visit Summary       This is your record. Keep this with you and show to your community pharmacist(s) and doctor(s) at your next visit.

## 2017-02-11 NOTE — ED PROVIDER NOTES
History     Chief Complaint   Patient presents with     Abdominal Pain     HPI  Andrew Lockwood is a 49 year old male with a history of HIV/AIDS on Genvoya,C&S toxoplasmosis, type II diabetes, GERD, chronic constipation and chronic abdominal pain who presents to ER with a history of some nausea and abdominal cramping.  He feels this is reflux.  Patient hasn t been on any of his reflux medications recently.  He supposedly has been on Zantac 150 mg in the past.  Denies any fever or chills.  Last bowel movement was 9 PM last night which was normal.  No bright red blood per rectum or black stools.  Patient states he s had no chills or sweats.  The patient has not had any prior past history of hematemesis or GI bleed.  He had apparently a similar bout with this back in January 30 was in the ER for his abdominal pain and was given a GI cocktail that apparently did help.  At this point patient just wants to have something to help with the reflux.  This part of the document was transcribed by James Salcedo, Medical Scribe.       I have reviewed the Medications, Allergies, Past Medical and Surgical History, and Social History in the [employer] system.    PAST MEDICAL HISTORY:   Past Medical History   Diagnosis Date     AIDS (H)      Allergic rhinitis due to other allergen      DNS     Chronic abdominal pain      CNS toxoplasmosis (H)      Diabetes type 2, controlled (H)      GERD (gastroesophageal reflux disease)      HIV (human immunodeficiency virus infection) (H)      Periungual wart      Sleep apnea      doesn't use CPAP       PAST SURGICAL HISTORY:   Past Surgical History   Procedure Laterality Date     C nonspecific procedure       right forearm fracture     Hc explore undesc testis,inguin/scrotal       Optical tracking system craniotomy, excise tumor, combined Left 4/10/2015     Procedure: COMBINED OPTICAL TRACKING SYSTEM CRANIOTOMY, EXCISE TUMOR;  Surgeon: Mirlande Colmenares MD;  Location: [facility] OR     Colonoscopy Left  1/22/2016     Procedure: COMBINED COLONOSCOPY, SINGLE OR MULTIPLE BIOPSY/POLYPECTOMY BY BIOPSY;  Surgeon: Clark Saini MD;  Location: [facility] GI     Laparoscopy diagnostic (general) N/A 7/26/2016     Procedure: LAPAROSCOPY DIAGNOSTIC (GENERAL);  Surgeon: Susannah Arriaga MD;  Location: [facility] OR     Repair ligament ulnar collateral thumb (gamekeeper's) Right 12/2/2016     Procedure: REPAIR LIGAMENT ULNAR COLLATERAL THUMB (GAMEKEEPER'S);  Surgeon: Evin Zamorano MD;  Location: [facility] OR       FAMILY HISTORY:   Family History   Problem Relation Age of Onset     DIABETES Brother      DIABETES Father      CANCER No family hx of      no skin cancer     Skin Cancer No family hx of      no famiy hx of skin cancer     Unknown/Adopted Mother      DIABETES Paternal Grandfather      Alzheimer Disease Father        SOCIAL HISTORY:   Social History   Substance Use Topics     Smoking status: Current Every Day Smoker     Packs/day: 0.25     Last attempt to quit: 10/28/2016     Smokeless tobacco: Former User      Comment: just smoked very rarely/socailly in the past     Alcohol use No      Comment: Last etoh in 2007       No current facility-administered medications for this encounter.      Current Outpatient Prescriptions   Medication     Famotidine (PEPCID AC MAXIMUM STRENGTH PO)     RABEprazole (ACIPHEX) 20 MG EC tablet     hyoscyamine (ANASPAZ/LEVSIN) 0.125 MG tablet     metFORMIN (GLUCOPHAGE) 500 MG tablet     GENVOYA [phone] MG PO TABS     simethicone (GAS RELIEF) 80 MG chewable tablet     AZITHROMYCIN PO     sulfamethoxazole-trimethoprim (BACTRIM DS,SEPTRA DS) 800-160 MG per tablet     ranitidine (ZANTAC) 150 MG tablet     traMADol (ULTRAM) 50 MG tablet     blood glucose (NO BRAND SPECIFIED) lancets standard     blood glucose monitoring (NO BRAND SPECIFIED) meter device kit     blood glucose monitoring (NO BRAND SPECIFIED) test strip        Allergies   Allergen Reactions     Milk [Lac Bovis] Hives     Tylenol  [Acetaminophen] Itching         Review of Systems   Constitutional: Negative for fever and chills.   HENT: Negative for congestion.    Eyes: Negative for redness.   Respiratory: Negative for shortness of breath.    Cardiovascular: Negative for chest pain.   Gastrointestinal: Positive for abdominal pain and constipation.   Genitourinary: Negative for difficulty urinating.   Musculoskeletal: Negative for arthralgias and neck stiffness.   Skin: Negative for color change.   Neurological: Negative for headaches.   Psychiatric/Behavioral: Negative for confusion.   All other systems reviewed and are negative.      Physical Exam   BP: (!) 130/96 mmHg  Heart Rate: 100  Temp: 97.8  F (36.6  C)  Resp: 18  Weight: 77.111 kg (170 lb)  SpO2: 99 %  Physical Exam   Constitutional: He is oriented to person, place, and time. He appears well-developed. No distress.   HENT:   Head: Normocephalic and atraumatic.   Mouth/Throat: Oropharynx is clear and moist. No oropharyngeal exudate.   Eyes: Pupils are equal, round, and reactive to light. No scleral icterus.   Neck: Normal range of motion.   Cardiovascular: Normal rate, regular rhythm, normal heart sounds and intact distal pulses.    Pulmonary/Chest: Effort normal and breath sounds normal. No respiratory distress.   Abdominal: Soft. Bowel sounds are normal. He exhibits no distension and no mass. There is tenderness (Epigastric). There is no rebound and no guarding.   Musculoskeletal: Normal range of motion. He exhibits no edema or tenderness.   Lymphadenopathy:     He has no cervical adenopathy.   Neurological: He is alert and oriented to person, place, and time.   Skin: Skin is warm. No rash noted. He is not diaphoretic.       ED Course     Procedures             Critical Care time:  none               Labs Ordered and Resulted from Time of ED Arrival Up to the Time of Departure from the ED   CBC WITH PLATELETS DIFFERENTIAL - Abnormal; Notable for the following:        Result Value     RBC Count 4.27 (*)     All other components within normal limits   COMPREHENSIVE METABOLIC PANEL - Abnormal; Notable for the following:     Potassium 3.2 (*)     Glucose 103 (*)     All other components within normal limits   LIPASE       Assessments & Plan (with Medical Decision Making)   1.  Epigastric pain 2.  GERD 3.  HIV/AIDS    Patient s 49-year-old male presents with history of ongoing epigastric pain.  He feels very strongly this was secondary to his reflux.  He s not been taking his omeprazole nor has he been on his ranitidine faithfully.  Patient has no black stools or bright red blood per rectum.  On objective exam his vital signs were stable. He is afebrile.  His abdominal exam revealed distinct tenderness in the epigastric area without radiation of pain to the posterior back.  Lipase was normal at 192.  Comprehensive metabolic panel shows normal LFTs and no significant abnormalities.  He is slightly hypokalemic at 3.2.  He was recommended to start potassium replacement home.  CBC was also normal and his abdominal x-ray shows a nonobstructive bowel gas pattern with air and stool throughout the colon.  He improved on fluid bolus.  He was given Protonix 40 mg IV through the ER.  He was observed over a period of roughly a 1 or 2 hour period gradually improving over the course of his stay .  GI cocktail did help.  I did recommend to perhaps try AcipHex 20 mg per day in place of the omeprazole.  Continue the use of H2 blocker ranitidine 150 mg twice a day.  Follow-up with his HIV physician and he may need to have a GI consult set up as he may need to have an upper GI endoscopy done given his ongoing breakthrough on PPI and H2 blocker therapy.  She was told if he has increased pain fever chills nausea vomiting come back to ER to be seen.  This part of the document was transcribed by James Salcedo, Medical Scribe.       I have reviewed the nursing notes.    I have reviewed the findings, diagnosis, plan and  need for follow up with the patient.    Discharge Medication List as of 2/11/2017 12:13 PM      START taking these medications    Details   RABEprazole (ACIPHEX) 20 MG EC tablet Take 1 tablet (20 mg) by mouth daily, Disp-30 tablet, R-1, Local Print             Final diagnoses:   Gastroesophageal reflux disease without esophagitis   Abdominal pain, epigastric       2/11/2017   [facility], [place], EMERGENCY DEPARTMENT       Zain Ramírez MD  02/12/17 [id number]

## 2017-02-11 NOTE — DISCHARGE INSTRUCTIONS
Please make an appointment to follow up with Your Primary Care Provider in 7 days if not improving.  Your labs are all normal and the xray of the abdomen is normal. Use the aciphex in place of the omeprazole to see if this will control the reflux symptoms.  Should you have increased abdominal pain, nausea and vomiting come back to the ER to be seen.

## 2017-02-11 NOTE — ED NOTES
Received report and assumed care of patient.  Patient awaiting MD. Patient states he feels dehydrated and he is willing to have PIV started and receive fluids.

## 2017-02-21 DIAGNOSIS — B02.9 HERPES ZOSTER WITHOUT COMPLICATION: ICD-10-CM

## 2017-02-22 DIAGNOSIS — B20 HUMAN IMMUNODEFICIENCY VIRUS (HIV) DISEASE (H): ICD-10-CM

## 2017-02-22 DIAGNOSIS — B20 HUMAN IMMUNODEFICIENCY VIRUS (HIV) DISEASE (H): Primary | ICD-10-CM

## 2017-02-22 LAB
ALBUMIN SERPL-MCNC: 3.8 G/DL (ref 3.4–5)
ALP SERPL-CCNC: 77 U/L (ref 40–150)
ALT SERPL W P-5'-P-CCNC: 26 U/L (ref 0–70)
ANION GAP SERPL CALCULATED.3IONS-SCNC: 8 MMOL/L (ref 3–14)
AST SERPL W P-5'-P-CCNC: 19 U/L (ref 0–45)
BASOPHILS # BLD AUTO: 0.1 10E9/L (ref 0–0.2)
BASOPHILS NFR BLD AUTO: 0.8 %
BILIRUB SERPL-MCNC: 0.5 MG/DL (ref 0.2–1.3)
BUN SERPL-MCNC: 10 MG/DL (ref 7–30)
CALCIUM SERPL-MCNC: 9 MG/DL (ref 8.5–10.1)
CD3 CELLS # BLD: 882 CELLS/UL (ref 603–2990)
CD3 CELLS NFR BLD: 37 % (ref 49–84)
CD3+CD4+ CELLS # BLD: 183 CELLS/UL (ref 441–2156)
CD3+CD4+ CELLS NFR BLD: 8 % (ref 28–63)
CD3+CD4+ CELLS/CD3+CD8+ CLL BLD: 0.3 % (ref 1.4–2.6)
CD3+CD8+ CELLS # BLD: 648 CELLS/UL (ref 125–1312)
CD3+CD8+ CELLS NFR BLD: 27 % (ref 10–40)
CHLORIDE SERPL-SCNC: 107 MMOL/L (ref 94–109)
CO2 SERPL-SCNC: 25 MMOL/L (ref 20–32)
CREAT SERPL-MCNC: 0.86 MG/DL (ref 0.66–1.25)
DIFFERENTIAL METHOD BLD: ABNORMAL
EOSINOPHIL # BLD AUTO: 0.1 10E9/L (ref 0–0.7)
EOSINOPHIL NFR BLD AUTO: 2.1 %
ERYTHROCYTE [DISTWIDTH] IN BLOOD BY AUTOMATED COUNT: 13.8 % (ref 10–15)
GFR SERPL CREATININE-BSD FRML MDRD: NORMAL ML/MIN/1.7M2
GLUCOSE SERPL-MCNC: 97 MG/DL (ref 70–99)
HCT VFR BLD AUTO: 40.8 % (ref 40–53)
HGB BLD-MCNC: 14 G/DL (ref 13.3–17.7)
IFC SPECIMEN: ABNORMAL
IMM GRANULOCYTES # BLD: 0.1 10E9/L (ref 0–0.4)
IMM GRANULOCYTES NFR BLD: 1.1 %
IMMUNODEFICIENCY MARKERS SPEC-IMP: ABNORMAL
LYMPHOCYTES # BLD AUTO: 2.4 10E9/L (ref 0.8–5.3)
LYMPHOCYTES NFR BLD AUTO: 38.5 %
MCH RBC QN AUTO: 32 PG (ref 26.5–33)
MCHC RBC AUTO-ENTMCNC: 34.3 G/DL (ref 31.5–36.5)
MCV RBC AUTO: 93 FL (ref 78–100)
MONOCYTES # BLD AUTO: 0.4 10E9/L (ref 0–1.3)
MONOCYTES NFR BLD AUTO: 6.8 %
NEUTROPHILS # BLD AUTO: 3.2 10E9/L (ref 1.6–8.3)
NEUTROPHILS NFR BLD AUTO: 50.7 %
NRBC # BLD AUTO: 0 10*3/UL
NRBC BLD AUTO-RTO: 0 /100
PLATELET # BLD AUTO: 264 10E9/L (ref 150–450)
POTASSIUM SERPL-SCNC: 3.8 MMOL/L (ref 3.4–5.3)
PROT SERPL-MCNC: 7.4 G/DL (ref 6.8–8.8)
RBC # BLD AUTO: 4.38 10E12/L (ref 4.4–5.9)
SODIUM SERPL-SCNC: 140 MMOL/L (ref 133–144)
WBC # BLD AUTO: 6.3 10E9/L (ref 4–11)

## 2017-02-22 PROCEDURE — 86359 T CELLS TOTAL COUNT: CPT | Performed by: INTERNAL MEDICINE

## 2017-02-22 PROCEDURE — 87536 HIV-1 QUANT&REVRSE TRNSCRPJ: CPT | Performed by: INTERNAL MEDICINE

## 2017-02-22 PROCEDURE — 86360 T CELL ABSOLUTE COUNT/RATIO: CPT | Performed by: INTERNAL MEDICINE

## 2017-02-22 NOTE — NURSING NOTE
Pt stopped into clinic today to see if he could get labs drawn and to get a copy of his new insurance number. Per [facility] Ambulatory Care Protocol, Pt is due for routine labs based on disease state or monitoring of medications. Lab orders entered. Pt was given copy of new insurance number.  Dalila Wilson RN

## 2017-02-23 RX ORDER — GABAPENTIN 300 MG/1
CAPSULE ORAL
Qty: 90 CAPSULE | Refills: 3 | Status: ON HOLD | OUTPATIENT
Start: 2017-02-23 | End: 2017-11-07

## 2017-02-24 ENCOUNTER — APPOINTMENT (OUTPATIENT)
Dept: GENERAL RADIOLOGY | Facility: CLINIC | Age: 52
End: 2017-02-24
Attending: EMERGENCY MEDICINE
Payer: COMMERCIAL

## 2017-02-24 ENCOUNTER — HOSPITAL ENCOUNTER (EMERGENCY)
Facility: CLINIC | Age: 52
Discharge: HOME | End: 2017-02-24
Attending: EMERGENCY MEDICINE | Admitting: EMERGENCY MEDICINE
Payer: COMMERCIAL

## 2017-02-24 VITALS
DIASTOLIC BLOOD PRESSURE: 70 MMHG | SYSTOLIC BLOOD PRESSURE: 109 MMHG | OXYGEN SATURATION: 97 % | TEMPERATURE: 97.8 F | HEART RATE: 73 BPM | HEIGHT: 64 IN | RESPIRATION RATE: 14 BRPM

## 2017-02-24 DIAGNOSIS — G89.29 OTHER CHRONIC PAIN: ICD-10-CM

## 2017-02-24 DIAGNOSIS — R10.12 ABDOMINAL PAIN, LEFT UPPER QUADRANT: ICD-10-CM

## 2017-02-24 DIAGNOSIS — B20 HUMAN IMMUNODEFICIENCY VIRUS (HIV) DISEASE (H): ICD-10-CM

## 2017-02-24 LAB
HIV1 RNA # PLAS NAA DL=20: ABNORMAL {COPIES}/ML
HIV1 RNA SERPL NAA+PROBE-LOG#: <1.3 {LOG_COPIES}/ML

## 2017-02-24 PROCEDURE — 25000132 ZZH RX MED GY IP 250 OP 250 PS 637: Performed by: EMERGENCY MEDICINE

## 2017-02-24 PROCEDURE — 99284 EMERGENCY DEPT VISIT MOD MDM: CPT | Mod: Z6 | Performed by: EMERGENCY MEDICINE

## 2017-02-24 PROCEDURE — 25000125 ZZHC RX 250: Performed by: EMERGENCY MEDICINE

## 2017-02-24 PROCEDURE — 99284 EMERGENCY DEPT VISIT MOD MDM: CPT | Performed by: EMERGENCY MEDICINE

## 2017-02-24 PROCEDURE — 74020 XR ABDOMEN 2 VW: CPT

## 2017-02-24 RX ORDER — ONDANSETRON 4 MG/1
4 TABLET, ORALLY DISINTEGRATING ORAL ONCE
Status: COMPLETED | OUTPATIENT
Start: 2017-02-24 | End: 2017-02-24

## 2017-02-24 RX ADMIN — LIDOCAINE HYDROCHLORIDE 30 ML: 20 SOLUTION ORAL; TOPICAL at 02:14

## 2017-02-24 RX ADMIN — RANITIDINE HYDROCHLORIDE 150 MG: 150 TABLET, FILM COATED ORAL at 03:37

## 2017-02-24 RX ADMIN — ONDANSETRON 4 MG: 4 TABLET, ORALLY DISINTEGRATING ORAL at 02:13

## 2017-02-24 ASSESSMENT — ENCOUNTER SYMPTOMS
CONSTIPATION: 0
NAUSEA: 1
DIARRHEA: 0
FEVER: 0
VOMITING: 0
ABDOMINAL PAIN: 1
SHORTNESS OF BREATH: 0

## 2017-02-24 NOTE — ED PROVIDER NOTES
History     Chief Complaint   Patient presents with     Abdominal Pain     Nausea     HPI  Andrew Lockwood is a 49 year old male with history of HIV and chronic abdominal pain who presents with abdominal pain.  This is similar to his chronic pain.  Pain is crampy in nature and in the LUQ.   He has some nausea but no vomiting.  Last BM was Yesterday morning and normal.  No blood in stool.  He has been compliant with his HIV meds.   He was seen at the ID clinic on Tuesday.   His viral load is still pending.   CD 4 count was 184.   NO fever.   No trauma.   He has a history of GERD and is on zantac/pepcid.         I have reviewed the Medications, Allergies, Past Medical and Surgical History, and Social History in the [email or web address] system.  Past Medical History   Diagnosis Date     AIDS (H)      Allergic rhinitis due to other allergen      DNS     Chronic abdominal pain      CNS toxoplasmosis (H)      Diabetes type 2, controlled (H)      GERD (gastroesophageal reflux disease)      HIV (human immunodeficiency virus infection) (H)      Periungual wart      Sleep apnea      doesn't use CPAP       Past Surgical History   Procedure Laterality Date     C nonspecific procedure       right forearm fracture     Hc explore undesc testis,inguin/scrotal       Optical tracking system craniotomy, excise tumor, combined Left 4/10/2015     Procedure: COMBINED OPTICAL TRACKING SYSTEM CRANIOTOMY, EXCISE TUMOR;  Surgeon: Mirlande Colmenares MD;  Location: [facility] OR     Colonoscopy Left 1/22/2016     Procedure: COMBINED COLONOSCOPY, SINGLE OR MULTIPLE BIOPSY/POLYPECTOMY BY BIOPSY;  Surgeon: Clark Saini MD;  Location: [facility] GI     Laparoscopy diagnostic (general) N/A 7/26/2016     Procedure: LAPAROSCOPY DIAGNOSTIC (GENERAL);  Surgeon: Susannah Arriaga MD;  Location: [facility] OR     Repair ligament ulnar collateral thumb (gamekeeper's) Right 12/2/2016     Procedure: REPAIR LIGAMENT ULNAR COLLATERAL THUMB (GAMEKEEPER'S);  Surgeon: Evin Zamorano  "MD Samuel;  Location:  OR       Family History   Problem Relation Age of Onset     DIABETES Brother      DIABETES Father      CANCER No family hx of      no skin cancer     Skin Cancer No family hx of      no famiy hx of skin cancer     Unknown/Adopted Mother      DIABETES Paternal Grandfather      Alzheimer Disease Father        Social History   Substance Use Topics     Smoking status: Current Every Day Smoker     Packs/day: 1.00     Last attempt to quit: 10/28/2016     Smokeless tobacco: Former User      Comment: just smoked very rarely/socailly in the past     Alcohol use No      Comment: Last etoh in 2007      Review of Systems   Constitutional: Negative for fever.   Respiratory: Negative for shortness of breath.    Cardiovascular: Negative for chest pain.   Gastrointestinal: Positive for abdominal pain and nausea. Negative for constipation, diarrhea and vomiting.   All other systems reviewed and are negative.      Physical Exam   BP: (!) 129/92  Pulse: 71  Temp: 97.5  F (36.4  C)  Resp: 16  Height: 162.6 cm (5' 4\")  SpO2: 97 %  Physical Exam Vital Signs and Nursing Notes Reviewed.  General:  NAD  HEENT: Oropharynx clear.  No obvious signs of trauma.  PERRL.  EOMI  Neck: Supple.  No lymphadenopathy.  Cardiac: RRR.  No murmurs, rubs or gallops  Lungs: Clear bilaterally.  Normal respiratory rate.    Abdomen:  Soft, Nontender, no rebound or guarding.  Back: No CVA tenderness.  Skin:  No rash.  No diaphoresis  Extremities:  No cyanosis, clubbing, or edema.  Neurological:  CN II-XII intact, Strength intact, Sensation intact, No pronator drift, Normal gait.  Pulse:  Symmetric in bilateral upper and lower extremities.      ED Course     ED Course     Procedures             Critical Care time:  none   I independently visualized the xray:  Abdominal XRay:  Nonobstructive bowel gas pattern.  No free air.             Labs Ordered and Resulted from Time of ED Arrival Up to the Time of Departure from the ED - No data to " display    Assessments & Plan (with Medical Decision Making)  49 year old ho presents with abdominal pain.  He has chronic abdominal pain.  This seems typical of his chronic pain.  X-ray does not show any signs of small bowel obstruction.  GI cocktail given in addition to Zantac with mild improvement of his symptoms.  No signs of intra-abdominal process.  Abdomen is nontender.  Patient will be discharged home.  He will follow up with the [facility] clinic this week.  He will return for worsening symptoms.       I have reviewed the nursing notes.    I have reviewed the findings, diagnosis, plan and need for follow up with the patient.    New Prescriptions    No medications on file       Final diagnoses:   Abdominal pain, left upper quadrant       2/24/2017   [facility], [place], EMERGENCY DEPARTMENT     Shaw Goldberg MD  02/24/17 [id number]

## 2017-02-24 NOTE — DISCHARGE INSTRUCTIONS
Please make an appointment to follow up with [facility] as soon as possible        *ABDOMINAL PAIN,UNCERTAIN CAUSE [Male]  Based on your visit today, the exact cause of your abdominal (stomach) pain is not certain. Your condition does not seem serious now; however, the signs of a serious problem may take more time to appear. Therefore, it is important for you to watch for any new symptoms or worsening of your condition.  HOME CARE:  1) Rest until your next exam. No strenuous activities.  2) Eat a diet low in fiber (called a low-residue diet). Foods allowed include refined breads, white rice, fruit and vegetable juices without pulp, tender meats. These foods will pass more easily through the intestine.  3) Avoid fried or fatty foods, dairy, alcohol and spicy foods until your symptoms go away.  FOLLOW UP with your doctor or this facility as instructed, or if your pain does not begin to improve in the next 24 hours.   GET PROMPT MEDICAL ATTENTION if any of the following occur:  -- Pain gets worse or moves to the right lower abdomen  -- New or worsening vomiting or diarrhea  -- Swelling of the abdomen  -- Unable to pass stool for more than three days  -- New fever over 101.0  F (38.3  C), or rising fever  -- Blood in vomit or bowel movements (dark red or black color)  -- Jaundice (yellow color of eyes and skin)  -- Weakness, dizziness or fainting  -- Chest, arm, back, neck or jaw pain    5168-6712 [employer], [address] [place] [address], [place] [address]. All rights reserved. This information is not intended as a substitute for professional medical care. Always follow your healthcare professional's instructions.

## 2017-02-24 NOTE — ED AVS SNAPSHOT
[facility], [place], Emergency Department    [address] [place] [id number]    Phone:  [phone]                                       Andrew Lockwood   MRN: [id number]    Department:  [facility], Emergency Department   Date of Visit:  2/24/2017           After Visit Summary Signature Page     I have received my discharge instructions, and my questions have been answered. I have discussed any challenges I see with this plan with the nurse or doctor.    ..........................................................................................................................................  Patient/Patient Representative Signature      ..........................................................................................................................................  Patient Representative Print Name and Relationship to Patient    ..................................................               ................................................  Date                                            Time    ..........................................................................................................................................  Reviewed by Signature/Title    ...................................................              ..............................................  Date                                                            Time
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
## 2017-02-24 NOTE — ED AVS SNAPSHOT
[facility], Emergency Department    [address] [address]    Phone:  [phone]                                       Andrew Lockwood   MRN: [id number]    Department:  [facility], Emergency Department   Date of Visit:  2/24/2017           Patient Information     Date Of Birth          11/27/1967        Your diagnoses for this visit were:     Abdominal pain, left upper quadrant        You were seen by Shaw Goldberg MD.        Discharge Instructions       Please make an appointment to follow up with [facility] as soon as possible        *ABDOMINAL PAIN,UNCERTAIN CAUSE [Male]  Based on your visit today, the exact cause of your abdominal (stomach) pain is not certain. Your condition does not seem serious now; however, the signs of a serious problem may take more time to appear. Therefore, it is important for you to watch for any new symptoms or worsening of your condition.  HOME CARE:  1) Rest until your next exam. No strenuous activities.  2) Eat a diet low in fiber (called a low-residue diet). Foods allowed include refined breads, white rice, fruit and vegetable juices without pulp, tender meats. These foods will pass more easily through the intestine.  3) Avoid fried or fatty foods, dairy, alcohol and spicy foods until your symptoms go away.  FOLLOW UP with your doctor or this facility as instructed, or if your pain does not begin to improve in the next 24 hours.   GET PROMPT MEDICAL ATTENTION if any of the following occur:  -- Pain gets worse or moves to the right lower abdomen  -- New or worsening vomiting or diarrhea  -- Swelling of the abdomen  -- Unable to pass stool for more than three days  -- New fever over 101.0  F (38.3  C), or rising fever  -- Blood in vomit or bowel movements (dark red or black color)  -- Jaundice (yellow color of eyes and skin)  -- Weakness, dizziness or fainting  -- Chest, arm, back, neck or jaw pain    2091-6330 [employer], [address]  [place] [address]. All rights reserved. This information is not intended as a substitute for professional medical care. Always follow your healthcare professional's instructions.       Future Appointments        Provider Department Dept Phone Center    2/24/2017 1:00 PM Aguila Ingram MD [facility] Orthopaedic [facility] [phone] [facility]    2/27/2017 5:00 PM Shabnam Saldaña OT [facility] Hand Therapy [phone] [facility]    3/2/2017 9:30 AM Sharon Rosado MD [facility] and Infectious Diseases [phone] [facility]    4/25/2017 8:00 AM Jonas Alan [facility] Gastroenterology and [phone] [facility]      24 Hour Appointment Hotline       To make an appointment at any [facility], call [phone] ([phone]). If you don't have a family doctor or clinic, we will help you find one. [facility] are conveniently located to serve the needs of you and your family.             Review of your medicines      Our records show that you are taking the medicines listed below. If these are incorrect, please call your family doctor or clinic.        Dose / Directions Last dose taken    AZITHROMYCIN PO   Dose:  600 mg        Take 600 mg by mouth once a week   Refills:  0        blood glucose lancets standard   Commonly known as:  no brand specified   Quantity:  100 Box        Use to test blood sugar four times daily or as directed.   Refills:  5        blood glucose monitoring meter device kit   Commonly known as:  no brand specified   Quantity:  1 kit        Use to test blood sugar four times daily or as directed.   Refills:  0        blood glucose monitoring test strip   Commonly known as:  no brand specified   Quantity:  100 each        Use to test blood sugars four times daily or as directed   Refills:  5        gabapentin 300 MG capsule   Commonly known as:  NEURONTIN   Quantity:  90 capsule        TAKE ONE CAPSULE BY MOUTH ON DAY 1, THEN TAKE ONE CAPSULE TWICE  DAILY ON DAY 2 THEN TAKE ONE CAPSULE THREE TIMES A DAY.   Refills:  3        GENVOYA [phone] MG Tabs per tablet   Quantity:  30 tablet   Generic drug:  Tcblsnj-Oelyu-Okwwoofd-TenofAF        TAKE ONE TABLET BY MOUTH EVERY DAY   Refills:  11        hyoscyamine 0.125 MG tablet   Commonly known as:  ANASPAZ/LEVSIN   Dose:  0.125-0.25 mg   Quantity:  40 tablet        Take 1-2 tablets (125-250 mcg) by mouth every 4 hours as needed for cramping   Refills:  1        metFORMIN 500 MG tablet   Commonly known as:  GLUCOPHAGE   Quantity:  120 tablet        TAKE TWO TABLETS BY MOUTH TWICE A DAY WITH MEALS   Refills:  3        PEPCID AC MAXIMUM STRENGTH PO        Refills:  0        RABEprazole 20 MG EC tablet   Commonly known as:  ACIPHEX   Dose:  20 mg   Quantity:  30 tablet        Take 1 tablet (20 mg) by mouth daily   Refills:  1        ranitidine 150 MG tablet   Commonly known as:  ZANTAC   Dose:  150 mg   Quantity:  60 tablet        Take 1 tablet (150 mg) by mouth 2 times daily as needed for heartburn   Refills:  1        simethicone 80 MG chewable tablet   Commonly known as:  GAS RELIEF   Dose:  80 mg   Quantity:  30 tablet        Take 1 tablet (80 mg) by mouth every 6 hours as needed for flatulence or cramping   Refills:  0        sulfamethoxazole-trimethoprim 800-160 MG per tablet   Commonly known as:  BACTRIM DS/SEPTRA DS   Dose:  1 tablet        Take 1 tablet by mouth 2 times daily   Refills:  0        traMADol 50 MG tablet   Commonly known as:  ULTRAM   Dose:  50 mg   Quantity:  12 tablet        Take 1 tablet (50 mg) by mouth every 6 hours as needed for pain   Refills:  0                Procedures and tests performed during your visit     XR Abdomen 2 Views      Orders Needing Specimen Collection     None      Pending Results     Date and Time Order Name Status Description    2/24/2017 0208 XR Abdomen 2 Views Preliminary     2/22/2017 1234 HIV-1 RNA QUANTITATIVE In process             Pending Culture Results      No orders found from 2/22/2017 to 2/25/2017.            Thank you for choosing [facility]       Thank you for choosing [facility] for your care. Our goal is always to provide you with excellent care. Hearing back from our patients is one way we can continue to improve our services. Please take a few minutes to complete the written survey that you may receive in the mail after you visit with us. Thank you!        [employer] Information     [employer] gives you secure access to your electronic health record. If you see a primary care provider, you can also send messages to your care team and make appointments. If you have questions, please call your primary care clinic.  If you do not have a primary care provider, please call [phone] and they will assist you.        Care EveryWhere ID     This is your Care EveryWhere ID. This could be used by other organizations to access your [facility] medical records  [id number]        After Visit Summary       This is your record. Keep this with you and show to your community pharmacist(s) and doctor(s) at your next visit.

## 2017-03-01 ENCOUNTER — TELEPHONE (OUTPATIENT)
Dept: INFECTIOUS DISEASES | Facility: CLINIC | Age: 52
End: 2017-03-01

## 2017-03-02 ENCOUNTER — OFFICE VISIT (OUTPATIENT)
Dept: INFECTIOUS DISEASES | Facility: CLINIC | Age: 52
End: 2017-03-02
Attending: INTERNAL MEDICINE
Payer: COMMERCIAL

## 2017-03-02 VITALS
HEART RATE: 112 BPM | BODY MASS INDEX: 28.17 KG/M2 | DIASTOLIC BLOOD PRESSURE: 85 MMHG | SYSTOLIC BLOOD PRESSURE: 131 MMHG | WEIGHT: 165 LBS | HEIGHT: 64 IN | TEMPERATURE: 98.1 F

## 2017-03-02 DIAGNOSIS — Z72.51 PROBLEMS RELATED TO HIGH-RISK SEXUAL BEHAVIOR: ICD-10-CM

## 2017-03-02 DIAGNOSIS — B20 HUMAN IMMUNODEFICIENCY VIRUS (HIV) DISEASE (H): ICD-10-CM

## 2017-03-02 DIAGNOSIS — N52.9 ERECTILE DYSFUNCTION, UNSPECIFIED ERECTILE DYSFUNCTION TYPE: Primary | ICD-10-CM

## 2017-03-02 LAB
HCV AB SERPL QL IA: NORMAL
T PALLIDUM IGG+IGM SER QL: NEGATIVE

## 2017-03-02 PROCEDURE — 87522 HEPATITIS C REVRS TRNSCRPJ: CPT | Performed by: INTERNAL MEDICINE

## 2017-03-02 PROCEDURE — 36415 COLL VENOUS BLD VENIPUNCTURE: CPT | Performed by: INTERNAL MEDICINE

## 2017-03-02 PROCEDURE — 99213 OFFICE O/P EST LOW 20 MIN: CPT | Mod: ZF

## 2017-03-02 PROCEDURE — 87591 N.GONORRHOEAE DNA AMP PROB: CPT | Performed by: INTERNAL MEDICINE

## 2017-03-02 PROCEDURE — 87491 CHLMYD TRACH DNA AMP PROBE: CPT | Performed by: INTERNAL MEDICINE

## 2017-03-02 PROCEDURE — 86780 TREPONEMA PALLIDUM: CPT | Performed by: INTERNAL MEDICINE

## 2017-03-02 PROCEDURE — 87491 CHLMYD TRACH DNA AMP PROBE: CPT | Mod: 91 | Performed by: INTERNAL MEDICINE

## 2017-03-02 PROCEDURE — 86803 HEPATITIS C AB TEST: CPT | Performed by: INTERNAL MEDICINE

## 2017-03-02 PROCEDURE — 87591 N.GONORRHOEAE DNA AMP PROB: CPT | Mod: 91 | Performed by: INTERNAL MEDICINE

## 2017-03-02 PROCEDURE — 88112 CYTOPATH CELL ENHANCE TECH: CPT | Performed by: INTERNAL MEDICINE

## 2017-03-02 RX ORDER — SILDENAFIL 50 MG/1
25 TABLET, FILM COATED ORAL DAILY PRN
Qty: 30 TABLET | Refills: 0 | Status: ON HOLD | OUTPATIENT
Start: 2017-03-02 | End: 2017-11-07

## 2017-03-02 ASSESSMENT — PAIN SCALES - GENERAL: PAINLEVEL: NO PAIN (0)

## 2017-03-02 NOTE — LETTER
3/2/2017       RE: Andrew Lockwood  [address]   [facility] [address]     Dear Colleague,    Thank you for referring your patient, Andrew Lockwood, to the [facility] AND INFECTIOUS DISEASES at [facility]. Please see a copy of my visit note below.    [facility] - HIV Progress Note  Dr. Sharon Rosado, [facility], [facility], [address] [place] [address], Sixth Floor, [address]  [place] [address]  Patient:  Anderw Lockwood, Date of birth 11/27/1967, Medical record number [id number]  Date of Visit: Mar 2, 2017         Assessment and Recommendations:     Human immunodeficiency virus (HIV) disease (H)  Continue Genvoya and Bactrim.  Follow up within 3 months or sooner as needed. Safety and surrogate markers are up to date.     DMII   Being followed by Ms. Dean in primary care    High risk injection drug exposure  I counseled Andrew extensively on this today - both with reference to his personal risk of new exposures to drug resistant HIV and viral hepatitis, but also on the risk that he brings to any person that he shares needles with.  I did discuss PrEP for his partner.  He will discuss it although he is concerned that his partner is already HIV positive.  He reports that his partner is well aware of Andrew's status.  I will check Andrew for Hepatitis C Ab and DNA today.     Erectile dysfunction  I had previously prescribed Cialas and Andrew lost the script.  He is interested in Viagra today.  I will give him a limited script, he was again counseled that he has to start with a half dose based on his Genvoya interaction.  He verbalized an understanding of this and what to do if he has an erection lasting longer than 4 hours.  We had an extensive discussion today on the fact that he has a charted history of penile prosthesis from [facility] (which even includes  the serial number). He firmly denies this. I strongly encouraged him to see Urology her at the [facility].  He will consider this.    Preventative health care  Cardiovascular Juan Jose -               Lipids - CHOL 180, LDL 72 (8/18/2016)              Blood Pressure - /85 today  Cancer Screening              Colon - Up to date.  [facility]   Anal - Will check anal pap today  Immunizations              Hepatitis A - immune, serology 4/13/15              Hepatitis B - 6/16/2016, 3/10/2016, due for third              Influenza - Up To date              Pneumovax/Prevnar - Up to date               Tdap - 6/8/14 per [facility]              Menactra - Up to date.    Renal Health -  UA without proteinuria and creatinine normal in 12/2016.    Sexually Transmitted Infection Risk and Screening              Gonorrhea and Chlamydia - GC/chlamydia negative on 6/2016, will check today              Syphilis - negative on 6/2016, will check today    I spent more than 40 minutes today in face to face time managing the care of Andrew Lockwood.  Over 50% of my time on the unit was spent counseling the patient and/or coordinating care regarding services listed in this note.    Sharon Rosado MD  Division of Infectious Diseases and International Medicine  [phone]         History of Infectious Disease Illness:   Mr. Lockwood is a 49 year old man who I follow for HIV. He comes in for routine follow up. At our last visit he had some serious concerns about his living situation and was considering moving out despite the fact that he did not have an alternative place to go.  Today, he is feeling better about this.  He is interested in discussing the relationship that is developing between him and a new partner.  Since I have known Andrew, he has been reluctant to discuss current or previous sexual activity with men.  However, he is interested in this man and is now discussing marriage.  Of note, this partner has active opioid addiction and  Andrew for the first time a couple weeks ago used injection drugs and shared a needle.  He did not like the experience and is very worried about exposures to things like Hepatitis C. Andrew is also interested in obtaining STI testing and an anal pap today. He reports a high level of adherence to his ARVs.  He also continues on Bactrim prophylaxis.         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 They did not live together.  Historical use of ARVs: Triumeq, Genvoya  Historical Resistance Mutations: Susceptible        Past Medical and Surgical History:     Past Medical History   Diagnosis Date     AIDS (H)      Allergic rhinitis due to other allergen      DNS     Chronic abdominal pain      CNS toxoplasmosis (H)      Diabetes type 2, controlled (H)      GERD (gastroesophageal reflux disease)      HIV (human immunodeficiency virus infection) (H)      Periungual wart      Sleep apnea      doesn't use CPAP       Past Surgical History   Procedure Laterality Date     C nonspecific procedure       right forearm fracture     Hc explore undesc testis,inguin/scrotal       Optical tracking system craniotomy, excise tumor, combined Left 4/10/2015     Procedure: COMBINED OPTICAL TRACKING SYSTEM CRANIOTOMY, EXCISE TUMOR;  Surgeon: Mirlande Colmenares MD;  Location: [facility] OR     Colonoscopy Left 1/22/2016     Procedure: COMBINED COLONOSCOPY, SINGLE OR MULTIPLE BIOPSY/POLYPECTOMY BY BIOPSY;  Surgeon: Clark Saini MD;  [facility]     Laparoscopy diagnostic (general) N/A 7/26/2016     Procedure: LAPAROSCOPY DIAGNOSTIC (GENERAL);  Surgeon: Corina  Susannah Waller MD;  Location: [facility] OR     Repair ligament ulnar collateral thumb (gamekeeper's) Right 12/2/2016     Procedure: REPAIR LIGAMENT ULNAR COLLATERAL THUMB (GAMEKEEPER'S);  Surgeon: Evin Zamorano MD;  Location: [facility] OR           Family History:     Family History   Problem Relation Age of Onset     DIABETES Brother      DIABETES Father      Alzheimer Disease Father      Unknown/Adopted Mother      DIABETES Paternal Grandfather      CANCER No family hx of      no skin cancer     Skin Cancer No family hx of      no famiy hx of skin cancer           Social History:     Social History   Substance Use Topics     Smoking status: Current Every Day Smoker     Packs/day: 0.25     Last attempt to quit: 10/28/2016     Smokeless tobacco: Former User      Comment: just smoked very rarely/socailly in the past     Alcohol use No      Comment: Last etoh in 2007     Social History     Social History Narrative    Born in [facility] [place]  [place] the [place] in [place].  Last traveled to visit family in 2008.                 Review of Systems:   CONSTITUTIONAL: negative fevers or chills   EYES: negative for icterus, vision is stable  ENT: negative for hearing loss, sore throat   RESPIRATORY: negative for cough, sputum or dyspnea   CARDIOVASCULAR: negative for chest pain, palpitations   GASTROINTESTINAL: negative for nausea, vomiting, diarrhea  GENITOURINARY: negative for dysuria, no genital lesions   HEME: No easy bruising   INTEGUMENT: negative for or pruritus, great improvement in periungal warts  NEURO: Negative for headache         Current Medications:     Current Outpatient Prescriptions   Medication Sig Dispense Refill     sildenafil (VIAGRA) 50 MG cap/tab Take 0.5 tablets (25 mg) by mouth daily as needed for erectile dysfunction 30 tablet 0     gabapentin (NEURONTIN) 300 MG capsule TAKE ONE CAPSULE BY MOUTH ON DAY 1, THEN TAKE ONE CAPSULE TWICE DAILY ON DAY 2 THEN TAKE ONE CAPSULE THREE TIMES A DAY. 90 capsule 3      Famotidine (PEPCID AC MAXIMUM STRENGTH PO)        RABEprazole (ACIPHEX) 20 MG EC tablet Take 1 tablet (20 mg) by mouth daily 30 tablet 1     ranitidine (ZANTAC) 150 MG tablet Take 1 tablet (150 mg) by mouth 2 times daily as needed for heartburn 60 tablet 1     hyoscyamine (ANASPAZ/LEVSIN) 0.125 MG tablet Take 1-2 tablets (125-250 mcg) by mouth every 4 hours as needed for cramping 40 tablet 1     metFORMIN (GLUCOPHAGE) 500 MG tablet TAKE TWO TABLETS BY MOUTH TWICE A DAY WITH MEALS 120 tablet 3     traMADol (ULTRAM) 50 MG tablet Take 1 tablet (50 mg) by mouth every 6 hours as needed for pain 12 tablet 0     GENVOYA 040-483-101-10 MG PO TABS TAKE ONE TABLET BY MOUTH EVERY DAY (Patient taking differently: TAKE ONE TABLET BY MOUTH EVERY DAY (at bedtime)) 30 tablet 11     simethicone (GAS RELIEF) 80 MG chewable tablet Take 1 tablet (80 mg) by mouth every 6 hours as needed for flatulence or cramping 30 tablet 0     AZITHROMYCIN PO Take 600 mg by mouth once a week       blood glucose (NO BRAND SPECIFIED) lancets standard Use to test blood sugar four times daily or as directed. 100 Box 5     blood glucose monitoring (NO BRAND SPECIFIED) meter device kit Use to test blood sugar four times daily or as directed. 1 kit 0     blood glucose monitoring (NO BRAND SPECIFIED) test strip Use to test blood sugars four times daily or as directed 100 each 5     sulfamethoxazole-trimethoprim (BACTRIM DS,SEPTRA DS) 800-160 MG per tablet Take 1 tablet by mouth 2 times daily       guaiFENesin-codeine (ROBITUSSIN AC) 100-10 MG/5ML SOLN solution Take 5 mLs by mouth every 4 hours as needed for cough 236 mL 0     oseltamivir (TAMIFLU) 75 MG capsule Take 1 capsule (75 mg) by mouth 2 times daily 10 capsule 0            Immunization History:     Immunization History   Administered Date(s) Administered     Hepatitis B 03/10/2016, 06/16/2016     Influenza (IIV3) 10/17/2016     Influenza Vaccine IM 3yrs+ 4 Valent IIV4 12/22/2015     Mantoux 04/12/2015  "    Meningococcal (Menactra ) 08/31/2015, 03/10/2016     Pneumococcal (PCV 13) 03/10/2016     Pneumococcal 23 valent 06/16/2016     TDAP (BOOSTRIX AGES 10-64) 10/17/2016            Allergies:     Allergies   Allergen Reactions     Milk [Lac Bovis] Hives     Tylenol [Acetaminophen] Itching            Physical Exam:   /85  Pulse 112  Temp 98.1  F (36.7  C) (Oral)  Ht 1.626 m (5' 4\")  Wt 74.8 kg (165 lb)  BMI 28.32 kg/m2    Physical Examination:         Physical Exam:   /85  Pulse 112  Temp 98.1  F (36.7  C) (Oral)  Ht 1.626 m (5' 4\")  Wt 74.8 kg (165 lb)  BMI 28.32 kg/m2  Physical Examination:  GENERAL:  well-developed, well-nourished, seated in no acute distress.  HEENT:  Head is normocephalic, atraumatic   EYES:  Eyes have anicteric sclerae without conjunctival injection   ENT:  Oropharynx is moist without exudates or ulcers. Tongue is midline  NECK:  Supple. No cervical lymphadenopathy  LUNGS:  Clear to auscultation bilateral.   CARDIOVASCULAR:  Regular rate and rhythm with no murmurs, gallops or rubs.  ABDOMEN:  Normal bowel sounds, soft, nontender.   Rectal: no external ulcers, nonthrombosed hemorrhoid   SKIN:  No acute rashes. Significant regression of warts surrounding nails on bilateral hands  NEUROLOGIC:  Grossly nonfocal. Active x4 extremities          Laboratory Data:     CD4 Count  Absolute CD4   Date Value Ref Range Status   02/22/2017 183 (L) 441 - 2156 cells/uL Final     CD4 [place] T   Date Value Ref Range Status   02/22/2017 8 (L) 28 - 63 % Final     CD4:CD8 Ratio   Date Value Ref Range Status   02/22/2017 0.30 (L) 1.40 - 2.60 Final       HIV-1 RNA Quantitative:  HIV-1 RNA Quant Result   Date Value Ref Range Status   02/22/2017 (A) HIVND [Copies]/mL Final    <20  HIV-1 RNA Detected, less than 20 HIV-1 RNA copies/mL   The DAMON AmpliPrep/DAMON TaqMan HIV-1 test is an FDA-approved in vitro nucleic   acid amplification test for the quantitation of HIV-1 RNA in human plasma (EDTA   plasma) " using the DAMON AmpliPrep instrument for automated viral nucleic acid   extraction and the DAMON TaqMan Analyzer or DAMON TaqMan for automated Real   Time PCR amplification and detection of the viral nucleic acid target.   Titer results are reported in copies/ml. This assay is intended for use in   conjunction with clinical presentation and other laboratory markers of disease   prognosis and for use as an aid in assessing viral response to antiretroviral   treatment as measured by changes in plasma HIV-1 RNA levels. This test should   not be used as a donor screening test to confirm the presence of HIV-1   infection.         Metabolic Studies       Sodium   Date Value Ref Range Status   03/08/2017 140 133 - 144 mmol/L Final   02/22/2017 140 133 - 144 mmol/L Final     Potassium   Date Value Ref Range Status   03/08/2017 3.6 3.4 - 5.3 mmol/L Final   02/22/2017 3.8 3.4 - 5.3 mmol/L Final     Chloride   Date Value Ref Range Status   03/08/2017 106 94 - 109 mmol/L Final   02/22/2017 107 94 - 109 mmol/L Final     Carbon Dioxide   Date Value Ref Range Status   03/08/2017 24 20 - 32 mmol/L Final   02/22/2017 25 20 - 32 mmol/L Final     Anion Gap   Date Value Ref Range Status   03/08/2017 10 3 - 14 mmol/L Final   02/22/2017 8 3 - 14 mmol/L Final     Urea Nitrogen   Date Value Ref Range Status   03/08/2017 16 7 - 30 mg/dL Final   02/22/2017 10 7 - 30 mg/dL Final     Creatinine   Date Value Ref Range Status   03/08/2017 1.03 0.66 - 1.25 mg/dL Final   02/22/2017 0.86 0.66 - 1.25 mg/dL Final     GFR Estimate   Date Value Ref Range Status   03/08/2017 76 >60 mL/min/1.7m2 Final     Comment:     Non  GFR Calc   02/22/2017 >90  Non  GFR Calc   >60 mL/min/1.7m2 Final     Glucose   Date Value Ref Range Status   03/08/2017 143 (H) 70 - 99 mg/dL Final   02/22/2017 97 70 - 99 mg/dL Final     Hemoglobin A1C   Date Value Ref Range Status   01/23/2017 6.4 (H) 4.3 - 6.0 % Final     Calcium   Date Value Ref  Range Status   03/08/2017 8.4 (L) 8.5 - 10.1 mg/dL Final   02/22/2017 9.0 8.5 - 10.1 mg/dL Final     Phosphorus   Date Value Ref Range Status   12/26/2016 2.9 2.5 - 4.5 mg/dL Final     Magnesium   Date Value Ref Range Status   12/28/2016 2.0 1.6 - 2.3 mg/dL Final   12/26/2016 1.9 1.6 - 2.3 mg/dL Final     Lactic Acid   Date Value Ref Range Status   12/26/2016 1.6 0.7 - 2.1 mmol/L Final   11/06/2016 1.5 0.7 - 2.1 mmol/L Final       Hepatic Studies    Bilirubin Total   Date Value Ref Range Status   03/08/2017 0.4 0.2 - 1.3 mg/dL Final   02/22/2017 0.5 0.2 - 1.3 mg/dL Final     Alkaline Phosphatase   Date Value Ref Range Status   03/08/2017 87 40 - 150 U/L Final   02/22/2017 77 40 - 150 U/L Final     Albumin   Date Value Ref Range Status   03/08/2017 3.6 3.4 - 5.0 g/dL Final   02/22/2017 3.8 3.4 - 5.0 g/dL Final     AST   Date Value Ref Range Status   03/08/2017 17 0 - 45 U/L Final   02/22/2017 19 0 - 45 U/L Final     ALT   Date Value Ref Range Status   03/08/2017 23 0 - 70 U/L Final   02/22/2017 26 0 - 70 U/L Final       Hematology Studies      WBC   Date Value Ref Range Status   03/08/2017 4.9 4.0 - 11.0 10e9/L Final   02/22/2017 6.3 4.0 - 11.0 10e9/L Final     Absolute Neutrophil   Date Value Ref Range Status   03/08/2017 2.6 1.6 - 8.3 10e9/L Final   02/22/2017 3.2 1.6 - 8.3 10e9/L Final     Absolute Lymphocytes   Date Value Ref Range Status   03/08/2017 1.3 0.8 - 5.3 10e9/L Final   02/22/2017 2.4 0.8 - 5.3 10e9/L Final     Absolute Monocytes   Date Value Ref Range Status   03/08/2017 0.7 0.0 - 1.3 10e9/L Final   02/22/2017 0.4 0.0 - 1.3 10e9/L Final     Absolute Eosinophils   Date Value Ref Range Status   03/08/2017 0.2 0.0 - 0.7 10e9/L Final   02/22/2017 0.1 0.0 - 0.7 10e9/L Final     Hemoglobin   Date Value Ref Range Status   03/08/2017 13.4 13.3 - 17.7 g/dL Final   02/22/2017 14.0 13.3 - 17.7 g/dL Final     Hematocrit   Date Value Ref Range Status   03/08/2017 39.6 (L) 40.0 - 53.0 % Final   02/22/2017 40.8 40.0  - 53.0 % Final     Platelet Count   Date Value Ref Range Status   03/08/2017 212 150 - 450 10e9/L Final   02/22/2017 264 150 - 450 10e9/L Final       Hepatitis B Testing     Recent Labs   Lab Test  04/13/15   0816   HBCAB  Nonreactive   HEPBANG  Nonreactive   HBCM  Nonreactive   A nonreactive result suggests lack of recent exposure to the virus in the   preceding 6 months.     HBEABY  Negative  Reference range: Negative  (Note)  Performed by [email or web address],  [address] [facility] [place] [address] [phone]  [email or web address], Nayan Corley MD, Lab. Director     HBEAGN  Negative  Reference range: Negative  (Note)  Performed by [email or web address],  [address] [place] [address] [phone]  [email or web address], Nayan Corley MD, Lab. Director         Hepatitis C Testing     Hepatitis C Antibody   Date Value Ref Range Status   03/02/2017  NR Final    Nonreactive   Assay performance characteristics have not been established for newborns,   infants, and children     04/13/2015  NR Final    Nonreactive   Assay performance characteristics have not been established for newborns,   infants, and children       Imaging:  Recent Results (from the past 744 hour(s))   XR Abdomen 2 Views    Narrative    EXAM: XR ABDOMEN 2 VW  2/24/2017 2:38 AM      HISTORY: abd pain    COMPARISON: 2/11/2017    FINDINGS: Supine and upright radiographs of the abdomen.  Nonobstructive bowel gas pattern. No radio opaque stones. Mild to  moderate colonic stool load. No evidence of pneumoperitoneum.       Impression    IMPRESSION: Nonobstructive bowel gas pattern with mild to moderate  colonic stool burden.         I have personally reviewed the examination and initial interpretation  and I agree with the findings.    NAYAN LEONG MD   XR Chest 2 Views    Narrative    PA and lateral chest    HISTORY: Cough body aches headaches    COMPARISON STUDY: 11/29/2016.    FINDINGS: Cardiac silhouette is nonenlarged. Lungs clear.      Impression    IMPRESSION:  Clear chest    JOSEFINA GALAN MD   XR Chest 2 Views    Narrative    EXAM: XR CHEST 2 VW  3/9/2017 7:58 PM     HISTORY:  cough       COMPARISON:  3/8/2017    FINDINGS: PA and lateral radiographs of the chest. The mediastinal  border, cardiac silhouette, and pulmonary vasculature are within  normal limits. No focal airspace opacities. No pneumothorax. No  pleural effusions.      Impression    IMPRESSION: No focal airspace disease.     I have personally reviewed the examination and initial interpretation  and I agree with the findings.    ALFREDA SHIPLEY MD       Again, thank you for allowing me to participate in the care of your patient.      Sincerely,    Sharon Rosado MD

## 2017-03-02 NOTE — NURSING NOTE
"Chief Complaint   Patient presents with     RECHECK     follow up with B20, tzimmer cma       Initial /85  Pulse 112  Temp 98.1  F (36.7  C) (Oral)  Ht 1.626 m (5' 4\")  Wt 74.8 kg (165 lb)  BMI 28.32 kg/m2 Estimated body mass index is 28.32 kg/(m^2) as calculated from the following:    Height as of this encounter: 1.626 m (5' 4\").    Weight as of this encounter: 74.8 kg (165 lb).  Medication Reconciliation: complete    "

## 2017-03-02 NOTE — MR AVS SNAPSHOT
After Visit Summary   3/2/2017    Andrew Lockwood    MRN: [id number]           Patient Information     Date Of Birth          11/27/1967        Visit Information        Provider Department      3/2/2017 9:30 AM Sharon Rosado MD [facility]        Today's Diagnoses     Erectile dysfunction, unspecified erectile dysfunction type    -  1    Problems related to high-risk sexual behavior        Human immunodeficiency virus (HIV) disease (H)           Follow-ups after your visit        Follow-up notes from your care team     Return in about 3 months (around 6/2/2017).      Your next 10 appointments already scheduled     Mar 20, 2017  4:30 PM CDT   (Arrive by 4:15 PM)   JULIAN Hand with Shabnam Saldaña OT   [facility] ([facility])    [address] [place] [id number] [phone]            Apr 25, 2017  8:00 AM CDT   (Arrive by 7:45 AM)   New Patient Visit with Jonas Alan   [facility] ([facility])    [address] [place] [id number] [phone]              Who to contact     If you have questions or need follow up information about today's clinic visit or your schedule please contact [facility] INFECTIOUS DISEAS[facility] directly at [phone].  Normal or non-critical lab and imaging results will be communicated to you by MyChart, letter or phone within 4 business days after the clinic has received the results. If you do not hear from us within 7 days, please contact the clinic through MyChart or phone. If you have a critical or abnormal lab result, we will notify you by phone as soon as possible.  Submit refill requests through [employer] or call your pharmacy and they will forward the refill request to us. Please allow 3 business days for your refill to be completed.          Additional Information About  "Your Visit        MyChart Information     [email or web address] gives you secure access to your electronic health record. If you see a primary care provider, you can also send messages to your care team and make appointments. If you have questions, please call your primary care clinic.  If you do not have a primary care provider, please call [phone] and they will assist you.        Care EveryWhere ID     This is your Care EveryWhere ID. This could be used by other organizations to access your [facility] medical records  [id number]        Your Vitals Were     Pulse Temperature Height BMI (Body Mass Index)          112 98.1  F (36.7  C) (Oral) 1.626 m (5' 4\") 28.32 kg/m2         Blood Pressure from Last 3 Encounters:   03/09/17 105/65   03/08/17 97/78   03/02/17 131/85    Weight from Last 3 Encounters:   03/09/17 74.8 kg (165 lb)   03/08/17 74.8 kg (165 lb)   03/02/17 74.8 kg (165 lb)              We Performed the Following     Anal Pap Smear     Chlamydia PCR (set at Rectal) (QZD533)     Chlamydia PCR (set at Throat) (KIB999)     Cytology non gyn     Gonorrhea PCR (set at Rectal) ([id number])     Gonorrhea PCR (set at Throat) ([id number])          Today's Medication Changes          These changes are accurate as of: 3/2/17 11:59 PM.  If you have any questions, ask your nurse or doctor.               Start taking these medicines.        Dose/Directions    sildenafil 50 MG cap/tab   Commonly known as:  VIAGRA   Used for:  Erectile dysfunction, unspecified erectile dysfunction type   Started by:  Sharon Rosado MD        Dose:  25 mg   Take 0.5 tablets (25 mg) by mouth daily as needed for erectile dysfunction   Quantity:  30 tablet   Refills:  0         These medicines have changed or have updated prescriptions.        Dose/Directions    GENVOYA [phone] MG Tabs per tablet   This may have changed:  See the new instructions.   Used for:  Human immunodeficiency virus (HIV) disease (H)   Generic drug:  " Jtxnstl-Mlzxu-Seilzkrs-TenofAF        TAKE ONE TABLET BY MOUTH EVERY DAY   Quantity:  30 tablet   Refills:  11            Where to get your medicines      Some of these will need a paper prescription and others can be bought over the counter.  Ask your nurse if you have questions.     Bring a paper prescription for each of these medications     sildenafil 50 MG cap/tab                Primary Care Provider Office Phone # Fax #    Sharon Carrie Rosado [phone]       [address] [address]        Thank you!     Thank you for choosing [facility] AND INFECTIOUS DISEASES  for your care. Our goal is always to provide you with excellent care. Hearing back from our patients is one way we can continue to improve our services. Please take a few minutes to complete the written survey that you may receive in the mail after your visit with us. Thank you!             Your Updated Medication List - Protect others around you: Learn how to safely use, store and throw away your medicines at www.disposemymeds.org.          This list is accurate as of: 3/2/17 11:59 PM.  Always use your most recent med list.                   Brand Name Dispense Instructions for use    AZITHROMYCIN PO      Take 600 mg by mouth once a week       blood glucose lancets standard    no brand specified    100 Box    Use to test blood sugar four times daily or as directed.       blood glucose monitoring meter device kit    no brand specified    1 kit    Use to test blood sugar four times daily or as directed.       blood glucose monitoring test strip    no brand specified    100 each    Use to test blood sugars four times daily or as directed       gabapentin 300 MG capsule    NEURONTIN    90 capsule    TAKE ONE CAPSULE BY MOUTH ON DAY 1, THEN TAKE ONE CAPSULE TWICE DAILY ON DAY 2 THEN TAKE ONE CAPSULE THREE TIMES A DAY.       GENVOYA [phone] MG Tabs per tablet   Generic  drug:  Kfbypns-Mttiz-Eacjdyin-TenofAF     30 tablet    TAKE ONE TABLET BY MOUTH EVERY DAY       hyoscyamine 0.125 MG tablet    ANASPAZ/LEVSIN    40 tablet    Take 1-2 tablets (125-250 mcg) by mouth every 4 hours as needed for cramping       metFORMIN 500 MG tablet    GLUCOPHAGE    120 tablet    TAKE TWO TABLETS BY MOUTH TWICE A DAY WITH MEALS       PEPCID AC MAXIMUM STRENGTH PO          RABEprazole 20 MG EC tablet    ACIPHEX    30 tablet    Take 1 tablet (20 mg) by mouth daily       ranitidine 150 MG tablet    ZANTAC    60 tablet    Take 1 tablet (150 mg) by mouth 2 times daily as needed for heartburn       sildenafil 50 MG cap/tab    VIAGRA    30 tablet    Take 0.5 tablets (25 mg) by mouth daily as needed for erectile dysfunction       simethicone 80 MG chewable tablet    GAS RELIEF    30 tablet    Take 1 tablet (80 mg) by mouth every 6 hours as needed for flatulence or cramping       sulfamethoxazole-trimethoprim 800-160 MG per tablet    BACTRIM DS/SEPTRA DS     Take 1 tablet by mouth 2 times daily       traMADol 50 MG tablet    ULTRAM    12 tablet    Take 1 tablet (50 mg) by mouth every 6 hours as needed for pain

## 2017-03-03 LAB
C TRACH DNA SPEC QL NAA+PROBE: NORMAL
COPATH REPORT: NORMAL
N GONORRHOEA DNA SPEC QL NAA+PROBE: NORMAL
SPECIMEN SOURCE: NORMAL

## 2017-03-06 LAB
HCV RNA SERPL NAA+PROBE-ACNC: NORMAL [IU]/ML
HCV RNA SERPL NAA+PROBE-LOG IU: NORMAL LOG IU/ML

## 2017-03-08 ENCOUNTER — HOSPITAL ENCOUNTER (EMERGENCY)
Facility: CLINIC | Age: 52
Discharge: HOME | End: 2017-03-08
Attending: EMERGENCY MEDICINE | Admitting: EMERGENCY MEDICINE
Payer: COMMERCIAL

## 2017-03-08 ENCOUNTER — APPOINTMENT (OUTPATIENT)
Dept: GENERAL RADIOLOGY | Facility: CLINIC | Age: 52
End: 2017-03-08
Attending: EMERGENCY MEDICINE
Payer: COMMERCIAL

## 2017-03-08 VITALS
DIASTOLIC BLOOD PRESSURE: 78 MMHG | SYSTOLIC BLOOD PRESSURE: 97 MMHG | WEIGHT: 165 LBS | OXYGEN SATURATION: 98 % | HEART RATE: 107 BPM | BODY MASS INDEX: 28.17 KG/M2 | TEMPERATURE: 98 F | HEIGHT: 64 IN | RESPIRATION RATE: 20 BRPM

## 2017-03-08 DIAGNOSIS — B20 HUMAN IMMUNODEFICIENCY VIRUS (HIV) DISEASE (H): ICD-10-CM

## 2017-03-08 DIAGNOSIS — J10.1 INFLUENZA B: ICD-10-CM

## 2017-03-08 DIAGNOSIS — R52 BODY ACHES: ICD-10-CM

## 2017-03-08 DIAGNOSIS — R05.9 COUGH: ICD-10-CM

## 2017-03-08 LAB
ALBUMIN SERPL-MCNC: 3.6 G/DL (ref 3.4–5)
ALP SERPL-CCNC: 87 U/L (ref 40–150)
ALT SERPL W P-5'-P-CCNC: 23 U/L (ref 0–70)
ANION GAP SERPL CALCULATED.3IONS-SCNC: 10 MMOL/L (ref 3–14)
AST SERPL W P-5'-P-CCNC: 17 U/L (ref 0–45)
BASOPHILS # BLD AUTO: 0 10E9/L (ref 0–0.2)
BASOPHILS NFR BLD AUTO: 0.8 %
BILIRUB SERPL-MCNC: 0.4 MG/DL (ref 0.2–1.3)
BUN SERPL-MCNC: 16 MG/DL (ref 7–30)
CALCIUM SERPL-MCNC: 8.4 MG/DL (ref 8.5–10.1)
CHLORIDE SERPL-SCNC: 106 MMOL/L (ref 94–109)
CO2 SERPL-SCNC: 24 MMOL/L (ref 20–32)
CREAT SERPL-MCNC: 1.03 MG/DL (ref 0.66–1.25)
DIFFERENTIAL METHOD BLD: ABNORMAL
EOSINOPHIL # BLD AUTO: 0.2 10E9/L (ref 0–0.7)
EOSINOPHIL NFR BLD AUTO: 3.5 %
ERYTHROCYTE [DISTWIDTH] IN BLOOD BY AUTOMATED COUNT: 14.3 % (ref 10–15)
FLUAV+FLUBV AG SPEC QL: ABNORMAL
FLUAV+FLUBV AG SPEC QL: NEGATIVE
GFR SERPL CREATININE-BSD FRML MDRD: 76 ML/MIN/1.7M2
GLUCOSE SERPL-MCNC: 143 MG/DL (ref 70–99)
HCT VFR BLD AUTO: 39.6 % (ref 40–53)
HGB BLD-MCNC: 13.4 G/DL (ref 13.3–17.7)
IMM GRANULOCYTES # BLD: 0.1 10E9/L (ref 0–0.4)
IMM GRANULOCYTES NFR BLD: 1.4 %
LYMPHOCYTES # BLD AUTO: 1.3 10E9/L (ref 0.8–5.3)
LYMPHOCYTES NFR BLD AUTO: 26.5 %
MCH RBC QN AUTO: 32 PG (ref 26.5–33)
MCHC RBC AUTO-ENTMCNC: 33.8 G/DL (ref 31.5–36.5)
MCV RBC AUTO: 95 FL (ref 78–100)
MONOCYTES # BLD AUTO: 0.7 10E9/L (ref 0–1.3)
MONOCYTES NFR BLD AUTO: 14.3 %
NEUTROPHILS # BLD AUTO: 2.6 10E9/L (ref 1.6–8.3)
NEUTROPHILS NFR BLD AUTO: 53.5 %
NRBC # BLD AUTO: 0 10*3/UL
NRBC BLD AUTO-RTO: 0 /100
PLATELET # BLD AUTO: 212 10E9/L (ref 150–450)
POTASSIUM SERPL-SCNC: 3.6 MMOL/L (ref 3.4–5.3)
PROT SERPL-MCNC: 6.9 G/DL (ref 6.8–8.8)
RBC # BLD AUTO: 4.19 10E12/L (ref 4.4–5.9)
SODIUM SERPL-SCNC: 140 MMOL/L (ref 133–144)
SPECIMEN SOURCE: ABNORMAL
WBC # BLD AUTO: 4.9 10E9/L (ref 4–11)

## 2017-03-08 PROCEDURE — 96361 HYDRATE IV INFUSION ADD-ON: CPT | Performed by: EMERGENCY MEDICINE

## 2017-03-08 PROCEDURE — 99284 EMERGENCY DEPT VISIT MOD MDM: CPT | Mod: 25 | Performed by: EMERGENCY MEDICINE

## 2017-03-08 PROCEDURE — 87804 INFLUENZA ASSAY W/OPTIC: CPT | Performed by: EMERGENCY MEDICINE

## 2017-03-08 PROCEDURE — 99284 EMERGENCY DEPT VISIT MOD MDM: CPT | Mod: Z6 | Performed by: EMERGENCY MEDICINE

## 2017-03-08 PROCEDURE — 85025 COMPLETE CBC W/AUTO DIFF WBC: CPT | Performed by: EMERGENCY MEDICINE

## 2017-03-08 PROCEDURE — 71020 XR CHEST 2 VW: CPT

## 2017-03-08 PROCEDURE — 96360 HYDRATION IV INFUSION INIT: CPT | Performed by: EMERGENCY MEDICINE

## 2017-03-08 PROCEDURE — 25000132 ZZH RX MED GY IP 250 OP 250 PS 637: Performed by: EMERGENCY MEDICINE

## 2017-03-08 PROCEDURE — 80053 COMPREHEN METABOLIC PANEL: CPT | Performed by: EMERGENCY MEDICINE

## 2017-03-08 PROCEDURE — 25000128 H RX IP 250 OP 636: Performed by: EMERGENCY MEDICINE

## 2017-03-08 RX ORDER — IBUPROFEN 600 MG/1
600 TABLET, FILM COATED ORAL ONCE
Status: COMPLETED | OUTPATIENT
Start: 2017-03-08 | End: 2017-03-08

## 2017-03-08 RX ORDER — OSELTAMIVIR PHOSPHATE 75 MG/1
75 CAPSULE ORAL ONCE
Status: COMPLETED | OUTPATIENT
Start: 2017-03-08 | End: 2017-03-08

## 2017-03-08 RX ORDER — OSELTAMIVIR PHOSPHATE 75 MG/1
75 CAPSULE ORAL 2 TIMES DAILY
Qty: 10 CAPSULE | Refills: 0 | Status: SHIPPED | OUTPATIENT
Start: 2017-03-08 | End: 2017-03-26

## 2017-03-08 RX ADMIN — IBUPROFEN 600 MG: 600 TABLET ORAL at 17:22

## 2017-03-08 RX ADMIN — OSELTAMIVIR PHOSPHATE 75 MG: 75 CAPSULE ORAL at 17:17

## 2017-03-08 RX ADMIN — SODIUM CHLORIDE 1000 ML: 9 INJECTION, SOLUTION INTRAVENOUS at 16:17

## 2017-03-08 NOTE — ED AVS SNAPSHOT
[facility], [place], Emergency Department    [address] [place] [address]    Phone:  [phone]                                       Andrew Lockwood   MRN: [id number]    Department:  [facility], Emergency Department   Date of Visit:  3/8/2017           After Visit Summary Signature Page     I have received my discharge instructions, and my questions have been answered. I have discussed any challenges I see with this plan with the nurse or doctor.    ..........................................................................................................................................  Patient/Patient Representative Signature      ..........................................................................................................................................  Patient Representative Print Name and Relationship to Patient    ..................................................               ................................................  Date                                            Time    ..........................................................................................................................................  Reviewed by Signature/Title    ...................................................              ..............................................  Date                                                            Time

## 2017-03-08 NOTE — ED PROVIDER NOTES
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 Since yesterday, the patient has been experiencing a dry cough, headache and body aches. Today, the patient developed nausea and vomiting, prompting him to come to the ED. He denies fever or any other concerns or complaints at this time.    Past Medical History   Diagnosis Date     AIDS (H)      Allergic rhinitis due to other allergen      DNS     Chronic abdominal pain      CNS toxoplasmosis (H)      Diabetes type 2, controlled (H)      GERD (gastroesophageal reflux disease)      HIV (human immunodeficiency virus infection) (H)      Periungual wart      Sleep apnea      doesn't use CPAP       Past Surgical History   Procedure Laterality Date     C nonspecific procedure       right forearm fracture     Hc explore undesc testis,inguin/scrotal       Optical tracking system craniotomy, excise tumor, combined Left 4/10/2015     Procedure: COMBINED OPTICAL TRACKING SYSTEM CRANIOTOMY, EXCISE TUMOR;  Surgeon: Mirlande Colmenares MD;  Location: [facility] OR     Colonoscopy Left 1/22/2016     Procedure: COMBINED COLONOSCOPY, SINGLE OR MULTIPLE BIOPSY/POLYPECTOMY BY BIOPSY;  Surgeon: Clark Saini MD;  Location: [facility] GI     Laparoscopy diagnostic (general) N/A 7/26/2016     Procedure: LAPAROSCOPY DIAGNOSTIC (GENERAL);  Surgeon: Susannah Arriaga MD;  Location: [facility] OR     Repair ligament ulnar collateral thumb (gamekeeper's) Right 12/2/2016     Procedure: REPAIR LIGAMENT ULNAR COLLATERAL THUMB (GAMEKEEPER'S);  Surgeon: Evin Zamorano MD;  Location: [facility] OR       Family History   Problem Relation Age of Onset     DIABETES Brother      DIABETES Father      Alzheimer Disease Father      Unknown/Adopted Mother      DIABETES Paternal  "Grandfather      CANCER No family hx of      no skin cancer     Skin Cancer No family hx of      no famiy hx of skin cancer       Social History   Substance Use Topics     Smoking status: Current Every Day Smoker     Packs/day: 1.00     Last attempt to quit: 10/28/2016     Smokeless tobacco: Former User      Comment: just smoked very rarely/socailly in the past     Alcohol use No      Comment: Last etoh in 2007       No current facility-administered medications for this encounter.      Current Outpatient Prescriptions   Medication     oseltamivir (TAMIFLU) 75 MG capsule     AZITHROMYCIN PO     sulfamethoxazole-trimethoprim (BACTRIM DS,SEPTRA DS) 800-160 MG per tablet     sildenafil (VIAGRA) 50 MG cap/tab     gabapentin (NEURONTIN) 300 MG capsule     Famotidine (PEPCID AC MAXIMUM STRENGTH PO)     RABEprazole (ACIPHEX) 20 MG EC tablet     ranitidine (ZANTAC) 150 MG tablet     hyoscyamine (ANASPAZ/LEVSIN) 0.125 MG tablet     metFORMIN (GLUCOPHAGE) 500 MG tablet     traMADol (ULTRAM) 50 MG tablet     GENVOYA [phone] MG PO TABS     simethicone (GAS RELIEF) 80 MG chewable tablet     blood glucose (NO BRAND SPECIFIED) lancets standard     blood glucose monitoring (NO BRAND SPECIFIED) meter device kit     blood glucose monitoring (NO BRAND SPECIFIED) test strip        Allergies   Allergen Reactions     Milk [Lac Bovis] Hives     Tylenol [Acetaminophen] Itching     I have reviewed the Medications, Allergies, Past Medical and Surgical History, and Social History in the Epic system.    Review of Systems   ROS: 10 point ROS neg other than the symptoms noted above in the HPI.      Physical Exam   BP: 129/85  Pulse: 107  Temp: 98  F (36.7  C)  Resp: 20  Height: 162.6 cm (5' 4\")  Weight: 74.8 kg (165 lb)  SpO2: 99 %  Physical Exam   Constitutional: He appears well-developed and well-nourished. No distress.   HENT:   Mouth/Throat: Oropharynx is clear and moist.   Eyes: Right eye exhibits no discharge. Left eye exhibits no " discharge.   Cardiovascular: Normal rate.    No murmur heard.  Pulmonary/Chest: Effort normal. No stridor. No respiratory distress. He has no wheezes.   Abdominal: There is no tenderness.   Neurological: He is alert. No cranial nerve deficit.   No focal neurologic findings   Skin: Skin is warm. He is not diaphoretic. No erythema.       ED Course     3:53 PM  The patient was seen and examined by Dr. Mortensen in Room 22.     Procedures    Results for orders placed or performed during the hospital encounter of 03/08/17 (from the past 24 hour(s))   CBC with platelets differential   Result Value Ref Range    WBC 4.9 4.0 - 11.0 10e9/L    RBC Count 4.19 (L) 4.4 - 5.9 10e12/L    Hemoglobin 13.4 13.3 - 17.7 g/dL    Hematocrit 39.6 (L) 40.0 - 53.0 %    MCV 95 78 - 100 fl    MCH 32.0 26.5 - 33.0 pg    MCHC 33.8 31.5 - 36.5 g/dL    RDW 14.3 10.0 - 15.0 %    Platelet Count 212 150 - 450 10e9/L    Diff Method Automated Method     % Neutrophils 53.5 %    % Lymphocytes 26.5 %    % Monocytes 14.3 %    % Eosinophils 3.5 %    % Basophils 0.8 %    % Immature Granulocytes 1.4 %    Nucleated RBCs 0 0 /100    Absolute Neutrophil 2.6 1.6 - 8.3 10e9/L    Absolute Lymphocytes 1.3 0.8 - 5.3 10e9/L    Absolute Monocytes 0.7 0.0 - 1.3 10e9/L    Absolute Eosinophils 0.2 0.0 - 0.7 10e9/L    Absolute Basophils 0.0 0.0 - 0.2 10e9/L    Abs Immature Granulocytes 0.1 0 - 0.4 10e9/L    Absolute Nucleated RBC 0.0    Comprehensive metabolic panel   Result Value Ref Range    Sodium 140 133 - 144 mmol/L    Potassium 3.6 3.4 - 5.3 mmol/L    Chloride 106 94 - 109 mmol/L    Carbon Dioxide 24 20 - 32 mmol/L    Anion Gap 10 3 - 14 mmol/L    Glucose 143 (H) 70 - 99 mg/dL    Urea Nitrogen 16 7 - 30 mg/dL    Creatinine 1.03 0.66 - 1.25 mg/dL    GFR Estimate 76 >60 mL/min/1.7m2    GFR Estimate If Black >90   GFR Calc   >60 mL/min/1.7m2    Calcium 8.4 (L) 8.5 - 10.1 mg/dL    Bilirubin Total 0.4 0.2 - 1.3 mg/dL    Albumin 3.6 3.4 - 5.0 g/dL    Protein  Total 6.9 6.8 - 8.8 g/dL    Alkaline Phosphatase 87 40 - 150 U/L    ALT 23 0 - 70 U/L    AST 17 0 - 45 U/L   Influenza A/B antigen swab   Result Value Ref Range    Influenza A/B Agn Specimen Nasopharyngeal     Influenza A Negative NEG    Influenza B (A) NEG     Positive   Test results must be correlated with clinical data. If necessary, results   should be confirmed by a molecular assay or viral culture.     XR Chest 2 Views    Narrative    PA and lateral chest    HISTORY: Cough body aches headaches    COMPARISON STUDY: 11/29/2016.    FINDINGS: Cardiac silhouette is nonenlarged. Lungs clear.      Impression    IMPRESSION: Clear chest    JOSEFINA GALAN MD         Assessments & Plan (with Medical Decision Making)   This is a 49 year old male with a history of HIV/AIDS who is presenting to the Emergency Department with the symptoms of non-productive cough, diffuse body aches and headache. The patient s symptoms are concerning for influenza. I will check his influenza status. I will also obtain basic labs given his medical history to further evaluate.     His last CD4 count  from 2/22/17 was at 183. Will refer him to this primary-care physician for continuous management of his chronic condition.     Influenza B positive. Will threat with Tamiflu and have the patient follow up with his doctor in 2 days.    I have reviewed the nursing notes.  I have reviewed the findings, diagnosis, plan and need for follow up with the patient.  New Prescriptions    OSELTAMIVIR (TAMIFLU) 75 MG CAPSULE    Take 1 capsule (75 mg) by mouth 2 times daily       Final diagnoses:   Influenza B   Body aches   Cough     IValeri, am serving as a trained medical scribe to document services personally performed by Milena Mortensen MD, based on the provider's statements to me.      IMilena MD, was physically present and have reviewed and verified the accuracy of this note documented by Valeri Kerr.     3/8/2017   [facility], [place], [facility]  DEPARTMENT     Milena Mortensen MD  03/08/17 [id number]

## 2017-03-08 NOTE — ED AVS SNAPSHOT
[facility], Emergency Department    [address] [address]    Phone:  [phone]                                       Andrew Lockwood   MRN: [id number]    Department:  [facility], Emergency Department   Date of Visit:  3/8/2017           Patient Information     Date Of Birth          11/27/1967        Your diagnoses for this visit were:     Influenza B     Body aches     Cough        You were seen by Milena Mortensen MD.      Follow-up Information     Follow up with Sharon Rosado MD In 2 days.    Specialty:  Infectious Diseases    Why:  As needed    Contact information:    [facility]  [address]  [facility] [address]  [phone]          Follow up with [facility], Emergency Department.    Specialty:  EMERGENCY MEDICINE    Why:  As needed, If symptoms worsen    Contact information:    [address] [place] [id number] [phone]    Additional information:    The [facility] is located on the corner of [address] and [facility] on the [facility]. It is easily accessible from virtually any point in the [facility] area, via [employer] and [employer].        Discharge Instructions       Please make an appointment to follow up with Your Primary Care Provider in 2 days for a recheck.  Return to the ED for worsening symptoms or other concerns.        Influenza  Influenza ( the flu ) is an infection that affects your respiratory tract (the mouth, nose, and lungs, and the passages between them). Unlike a cold, the flu can make you very ill. And it can lead to pneumonia, a serious lung infection. For some people, especially older adults, young children, and people with certain chronic conditions, the flu can have serious complications and even be fatal.  What Are the Risk Factors for the Flu?     Viruses that cause influenza spread through the air in droplets when someone who has the flu coughs, sneezes, laughs, or  talks.   Anyone can get the flu. But you re more likely to become infected if you:    Have a weakened immune system.    Work in a health care setting where you may be exposed to flu germs.    Live or work with someone who has the flu.    Haven t received an annual flu shot.  How Does the Flu Spread?  The flu is caused by viruses. The viruses spread through the air in droplets when someone who has the flu coughs, sneezes, laughs, or talks. You can become infected when you inhale these viruses directly. You can also become infected when you touch a surface on which the droplets have landed and then transfer the germs to your eyes, nose, or mouth. Touching used tissues, or sharing utensils, drinking glasses, or a toothbrush with an infected person can expose you to flu viruses, too.  What Are the Symptoms of the Flu?  Flu symptoms tend to come on quickly and may last a few days to a few weeks. They include:    Fever usually higher than 101 F  (38.3 C) and chills    Sore throat and headache    Dry cough    Runny nose    Tiredness and weakness    Muscle aches  Factors That Can Make Flu Worse  For some people, the flu can be very serious. The risk of complications is greater for:    Children under age 5.    Adults 65 years of age and older.    People with a chronic illness, such as diabetes or heart, kidney, or lung disease.    People who live in a nursing home or long-term care facility.   How Is the Flu Treated?  Influenza usually improves after 7 days or so. In some cases, your health care provider may prescribe an antiviral medication. This may help you get well sooner. For the medication to help, you need to take it as soon as possible (ideally within 48 hours) after your symptoms start. If you develop pneumonia or other serious illness, hospital care may be needed.  Easing Flu Symptoms    Drink lots of fluids such as water, juice, and warm soup. A good rule is to drink enough so that you urinate your normal  amount.    Get plenty of rest.    Ask your health care provider what to take for fever and pain.    Call your provider if your fever rises over 101 F (38.3 C) or you become dizzy, lightheaded, or short of breath.  Taking Steps to Protect Others    Wash your hands often, especially after coughing or sneezing. Or, clean your hands with an alcohol-based hand  containing at least 60 percent alcohol.    Cough or sneeze into a tissue. Then throw the tissue away and wash your hands. If you don t have a tissue, cough and sneeze into the crook of your elbow.    Stay home until at least 24 hours after you no longer have a fever or chills. Be sure the fever isn t being hidden by fever-reducing medication.    Don t share food, utensils, drinking glasses, or a toothbrush with others.    Ask your health care provider if others in your household should receive antiviral medication to help them avoid infection.  How Can the Flu Be Prevented?    One of the best ways to avoid the flu is to get a flu vaccination each year. Viruses that cause the flu change from year to year. For that reason, doctors recommend getting the flu vaccine each year, as soon as it's available in your area. The vaccine may be given as a shot or as a nasal spray. Your health care provider can tell you which vaccine is right for you.    Wash your hands often. Frequent handwashing is a proven way to help prevent infection.    Carry an alcohol-based hand gel containing at least 60 percent alcohol. Use it when you don t have access to soap and water. Then wash your hands as soon as you can.    Avoid touching your eyes, nose, and mouth.    At home and work, clean phones, computer keyboards, and toys often with disinfectant wipes.    If possible, avoid close contact with others who have the flu or symptoms of the flu.  Handwashing Tips  Handwashing is one of the best ways to prevent many common infections. If you re caring for or visiting someone with the  flu, wash your hands each time you enter and leave the room. Follow these steps:    Use warm water and plenty of soap. Rub your hands together well.    Clean the whole hand, under your nails, between your fingers, and up the wrists.    Wash for at least 15 seconds.    Rinse, letting the water run down your fingers, not up your wrists.    Dry your hands well. Use a paper towel to turn off the faucet and open the door.  Using Alcohol-Based Hand   Alcohol-based hand  are also a good choice. Use them when you don t have access to soap and water. Follow these steps:    Squeeze about a tablespoon of gel into the palm of one hand.    Rub your hands together briskly, cleaning the backs of your hands, the palms, between your fingers, and up the wrists.    Rub until the gel is gone and your hands are completely dry.  Preventing Influenza in Healthcare Settings  The flu is a special concern for people in hospitals and long-term care facilities. To help prevent the spread of flu, many hospitals and nursing homes take these steps:    Health care providers wash their hands or use an alcohol-based hand  before and after treating each patient.    People with the flu have private rooms and bathrooms or share a room with someone with the same infection.    High-risk patients who don t have the flu are encouraged to get the flu and pneumonia vaccines.    All health care workers are encouraged or required to get flu shots.        5069-8951 [employer]. [address] [place] [address]. All rights reserved. This information is not intended as a substitute for professional medical care. Always follow your healthcare professional's instructions.            Future Appointments        Provider Department Dept Phone Center    3/10/2017 4:15 PM MD MAHENDRA Desouza [facility] Orthopaedic Clinic [phone] [facility]    3/20/2017 4:30 PM DAYSI Estrada [facility] Hand Therapy [phone] [facility]     4/25/2017 8:00 AM Jonas DE LA GARZAManuel Waqas [facility] Gastroenterology and [phone] [facility]      24 Hour Appointment Hotline       To make an appointment at any [facility], call [phone] ([phone]). If you don't have a family doctor or clinic, we will help you find one. [facility] are conveniently located to serve the needs of you and your family.             Review of your medicines      START taking        Dose / Directions Last dose taken    oseltamivir 75 MG capsule   Commonly known as:  TAMIFLU   Dose:  75 mg   Quantity:  10 capsule        Take 1 capsule (75 mg) by mouth 2 times daily   Refills:  0          Our records show that you are taking the medicines listed below. If these are incorrect, please call your family doctor or clinic.        Dose / Directions Last dose taken    AZITHROMYCIN PO   Dose:  600 mg        Take 600 mg by mouth once a week   Refills:  0        blood glucose lancets standard   Commonly known as:  no brand specified   Quantity:  100 Box        Use to test blood sugar four times daily or as directed.   Refills:  5        blood glucose monitoring meter device kit   Commonly known as:  no brand specified   Quantity:  1 kit        Use to test blood sugar four times daily or as directed.   Refills:  0        blood glucose monitoring test strip   Commonly known as:  no brand specified   Quantity:  100 each        Use to test blood sugars four times daily or as directed   Refills:  5        gabapentin 300 MG capsule   Commonly known as:  NEURONTIN   Quantity:  90 capsule        TAKE ONE CAPSULE BY MOUTH ON DAY 1, THEN TAKE ONE CAPSULE TWICE DAILY ON DAY 2 THEN TAKE ONE CAPSULE THREE TIMES A DAY.   Refills:  3        GENVOYA [phone] MG Tabs per tablet   Quantity:  30 tablet   Generic drug:  Nmkzpyf-Wyaex-Mwblikzo-TenofAF        TAKE ONE TABLET BY MOUTH EVERY DAY   Refills:  11        hyoscyamine 0.125 MG tablet   Commonly known as:  ANASPAZ/LEVSIN   Dose:   0.125-0.25 mg   Quantity:  40 tablet        Take 1-2 tablets (125-250 mcg) by mouth every 4 hours as needed for cramping   Refills:  1        metFORMIN 500 MG tablet   Commonly known as:  GLUCOPHAGE   Quantity:  120 tablet        TAKE TWO TABLETS BY MOUTH TWICE A DAY WITH MEALS   Refills:  3        PEPCID AC MAXIMUM STRENGTH PO        Refills:  0        RABEprazole 20 MG EC tablet   Commonly known as:  ACIPHEX   Dose:  20 mg   Quantity:  30 tablet        Take 1 tablet (20 mg) by mouth daily   Refills:  1        ranitidine 150 MG tablet   Commonly known as:  ZANTAC   Dose:  150 mg   Quantity:  60 tablet        Take 1 tablet (150 mg) by mouth 2 times daily as needed for heartburn   Refills:  1        sildenafil 50 MG cap/tab   Commonly known as:  VIAGRA   Dose:  25 mg   Quantity:  30 tablet        Take 0.5 tablets (25 mg) by mouth daily as needed for erectile dysfunction   Refills:  0        simethicone 80 MG chewable tablet   Commonly known as:  GAS RELIEF   Dose:  80 mg   Quantity:  30 tablet        Take 1 tablet (80 mg) by mouth every 6 hours as needed for flatulence or cramping   Refills:  0        sulfamethoxazole-trimethoprim 800-160 MG per tablet   Commonly known as:  BACTRIM DS/SEPTRA DS   Dose:  1 tablet        Take 1 tablet by mouth 2 times daily   Refills:  0        traMADol 50 MG tablet   Commonly known as:  ULTRAM   Dose:  50 mg   Quantity:  12 tablet        Take 1 tablet (50 mg) by mouth every 6 hours as needed for pain   Refills:  0                Prescriptions were sent or printed at these locations (1 Prescription)                   Other Prescriptions                Printed at Department/Unit printer (1 of 1)         oseltamivir (TAMIFLU) 75 MG capsule                Procedures and tests performed during your visit     CBC with platelets differential    Comprehensive metabolic panel    Influenza A/B antigen swab    XR Chest 2 Views      Orders Needing Specimen Collection     None      Pending Results      No orders found from 3/6/2017 to 3/9/2017.            Pending Culture Results     No orders found from 3/6/2017 to 3/9/2017.            Thank you for choosing [facility]       Thank you for choosing [facility] for your care. Our goal is always to provide you with excellent care. Hearing back from our patients is one way we can continue to improve our services. Please take a few minutes to complete the written survey that you may receive in the mail after you visit with us. Thank you!        [facility] Information     [employer] gives you secure access to your electronic health record. If you see a primary care provider, you can also send messages to your care team and make appointments. If you have questions, please call your primary care clinic.  If you do not have a primary care provider, please call [phone] and they will assist you.        Care EveryWhere ID     This is your Care EveryWhere ID. This could be used by other organizations to access your [facility] medical records  [id number]        After Visit Summary       This is your record. Keep this with you and show to your community pharmacist(s) and doctor(s) at your next visit.

## 2017-03-09 ENCOUNTER — HOSPITAL ENCOUNTER (EMERGENCY)
Facility: CLINIC | Age: 52
Discharge: HOME | End: 2017-03-09
Attending: EMERGENCY MEDICINE | Admitting: EMERGENCY MEDICINE
Payer: COMMERCIAL

## 2017-03-09 ENCOUNTER — APPOINTMENT (OUTPATIENT)
Dept: GENERAL RADIOLOGY | Facility: CLINIC | Age: 52
End: 2017-03-09
Attending: EMERGENCY MEDICINE
Payer: COMMERCIAL

## 2017-03-09 VITALS
HEART RATE: 107 BPM | DIASTOLIC BLOOD PRESSURE: 65 MMHG | BODY MASS INDEX: 28.17 KG/M2 | HEIGHT: 64 IN | WEIGHT: 165 LBS | TEMPERATURE: 99.8 F | SYSTOLIC BLOOD PRESSURE: 105 MMHG | RESPIRATION RATE: 16 BRPM | OXYGEN SATURATION: 93 %

## 2017-03-09 DIAGNOSIS — B20 HUMAN IMMUNODEFICIENCY VIRUS (HIV) DISEASE (H): ICD-10-CM

## 2017-03-09 DIAGNOSIS — J10.1 INFLUENZA B: ICD-10-CM

## 2017-03-09 PROCEDURE — 99284 EMERGENCY DEPT VISIT MOD MDM: CPT | Mod: Z6 | Performed by: EMERGENCY MEDICINE

## 2017-03-09 PROCEDURE — 25000125 ZZHC RX 250: Performed by: EMERGENCY MEDICINE

## 2017-03-09 PROCEDURE — 25000128 H RX IP 250 OP 636: Performed by: EMERGENCY MEDICINE

## 2017-03-09 PROCEDURE — 71020 XR CHEST 2 VW: CPT

## 2017-03-09 PROCEDURE — 94640 AIRWAY INHALATION TREATMENT: CPT | Performed by: EMERGENCY MEDICINE

## 2017-03-09 PROCEDURE — 99284 EMERGENCY DEPT VISIT MOD MDM: CPT | Mod: 25 | Performed by: EMERGENCY MEDICINE

## 2017-03-09 PROCEDURE — 25000132 ZZH RX MED GY IP 250 OP 250 PS 637: Performed by: EMERGENCY MEDICINE

## 2017-03-09 PROCEDURE — 96374 THER/PROPH/DIAG INJ IV PUSH: CPT | Performed by: EMERGENCY MEDICINE

## 2017-03-09 PROCEDURE — 96361 HYDRATE IV INFUSION ADD-ON: CPT | Performed by: EMERGENCY MEDICINE

## 2017-03-09 RX ORDER — KETOROLAC TROMETHAMINE 30 MG/ML
30 INJECTION, SOLUTION INTRAMUSCULAR; INTRAVENOUS ONCE
Status: COMPLETED | OUTPATIENT
Start: 2017-03-09 | End: 2017-03-09

## 2017-03-09 RX ORDER — IPRATROPIUM BROMIDE AND ALBUTEROL SULFATE 2.5; .5 MG/3ML; MG/3ML
3 SOLUTION RESPIRATORY (INHALATION) ONCE
Status: COMPLETED | OUTPATIENT
Start: 2017-03-09 | End: 2017-03-09

## 2017-03-09 RX ORDER — CODEINE PHOSPHATE AND GUAIFENESIN 10; 100 MG/5ML; MG/5ML
5 SOLUTION ORAL ONCE
Status: COMPLETED | OUTPATIENT
Start: 2017-03-09 | End: 2017-03-09

## 2017-03-09 RX ORDER — CODEINE PHOSPHATE AND GUAIFENESIN 10; 100 MG/5ML; MG/5ML
5 SOLUTION ORAL EVERY 4 HOURS PRN
Qty: 236 ML | Refills: 0 | Status: SHIPPED | OUTPATIENT
Start: 2017-03-09 | End: 2017-05-10

## 2017-03-09 RX ADMIN — SODIUM CHLORIDE 1000 ML: 9 INJECTION, SOLUTION INTRAVENOUS at 20:21

## 2017-03-09 RX ADMIN — GUAIFENESIN AND CODEINE PHOSPHATE 5 ML: 100; 10 SOLUTION ORAL at 20:41

## 2017-03-09 RX ADMIN — KETOROLAC TROMETHAMINE 30 MG: 30 INJECTION, SOLUTION INTRAMUSCULAR at 20:21

## 2017-03-09 RX ADMIN — IPRATROPIUM BROMIDE AND ALBUTEROL SULFATE 3 ML: .5; 3 SOLUTION RESPIRATORY (INHALATION) at 20:21

## 2017-03-09 ASSESSMENT — ENCOUNTER SYMPTOMS
LIGHT-HEADEDNESS: 0
VOMITING: 0
FEVER: 1
BRUISES/BLEEDS EASILY: 0
NAUSEA: 0
SORE THROAT: 0
BACK PAIN: 0
CHILLS: 1
RHINORRHEA: 1
SHORTNESS OF BREATH: 0
POLYDIPSIA: 0
NECK STIFFNESS: 0
ADENOPATHY: 0
DIFFICULTY URINATING: 0
COUGH: 1
NECK PAIN: 0
COLOR CHANGE: 0
AGITATION: 0
ABDOMINAL PAIN: 0

## 2017-03-09 NOTE — DISCHARGE INSTRUCTIONS
Please make an appointment to follow up with Your Primary Care Provider in 2 days for a recheck.  Return to the ED for worsening symptoms or other concerns.        Influenza  Influenza ( the flu ) is an infection that affects your respiratory tract (the mouth, nose, and lungs, and the passages between them). Unlike a cold, the flu can make you very ill. And it can lead to pneumonia, a serious lung infection. For some people, especially older adults, young children, and people with certain chronic conditions, the flu can have serious complications and even be fatal.  What Are the Risk Factors for the Flu?     Viruses that cause influenza spread through the air in droplets when someone who has the flu coughs, sneezes, laughs, or talks.   Anyone can get the flu. But you re more likely to become infected if you:    Have a weakened immune system.    Work in a health care setting where you may be exposed to flu germs.    Live or work with someone who has the flu.    Haven t received an annual flu shot.  How Does the Flu Spread?  The flu is caused by viruses. The viruses spread through the air in droplets when someone who has the flu coughs, sneezes, laughs, or talks. You can become infected when you inhale these viruses directly. You can also become infected when you touch a surface on which the droplets have landed and then transfer the germs to your eyes, nose, or mouth. Touching used tissues, or sharing utensils, drinking glasses, or a toothbrush with an infected person can expose you to flu viruses, too.  What Are the Symptoms of the Flu?  Flu symptoms tend to come on quickly and may last a few days to a few weeks. They include:    Fever usually higher than 101 F  (38.3 C) and chills    Sore throat and headache    Dry cough    Runny nose    Tiredness and weakness    Muscle aches  Factors That Can Make Flu Worse  For some people, the flu can be very serious. The risk of complications is greater for:    Children under  age 5.    Adults 65 years of age and older.    People with a chronic illness, such as diabetes or heart, kidney, or lung disease.    People who live in a nursing home or long-term care facility.   How Is the Flu Treated?  Influenza usually improves after 7 days or so. In some cases, your health care provider may prescribe an antiviral medication. This may help you get well sooner. For the medication to help, you need to take it as soon as possible (ideally within 48 hours) after your symptoms start. If you develop pneumonia or other serious illness, hospital care may be needed.  Easing Flu Symptoms    Drink lots of fluids such as water, juice, and warm soup. A good rule is to drink enough so that you urinate your normal amount.    Get plenty of rest.    Ask your health care provider what to take for fever and pain.    Call your provider if your fever rises over 101 F (38.3 C) or you become dizzy, lightheaded, or short of breath.  Taking Steps to Protect Others    Wash your hands often, especially after coughing or sneezing. Or, clean your hands with an alcohol-based hand  containing at least 60 percent alcohol.    Cough or sneeze into a tissue. Then throw the tissue away and wash your hands. If you don t have a tissue, cough and sneeze into the crook of your elbow.    Stay home until at least 24 hours after you no longer have a fever or chills. Be sure the fever isn t being hidden by fever-reducing medication.    Don t share food, utensils, drinking glasses, or a toothbrush with others.    Ask your health care provider if others in your household should receive antiviral medication to help them avoid infection.  How Can the Flu Be Prevented?    One of the best ways to avoid the flu is to get a flu vaccination each year. Viruses that cause the flu change from year to year. For that reason, doctors recommend getting the flu vaccine each year, as soon as it's available in your area. The vaccine may be given as a  shot or as a nasal spray. Your health care provider can tell you which vaccine is right for you.    Wash your hands often. Frequent handwashing is a proven way to help prevent infection.    Carry an alcohol-based hand gel containing at least 60 percent alcohol. Use it when you don t have access to soap and water. Then wash your hands as soon as you can.    Avoid touching your eyes, nose, and mouth.    At home and work, clean phones, computer keyboards, and toys often with disinfectant wipes.    If possible, avoid close contact with others who have the flu or symptoms of the flu.  Handwashing Tips  Handwashing is one of the best ways to prevent many common infections. If you re caring for or visiting someone with the flu, wash your hands each time you enter and leave the room. Follow these steps:    Use warm water and plenty of soap. Rub your hands together well.    Clean the whole hand, under your nails, between your fingers, and up the wrists.    Wash for at least 15 seconds.    Rinse, letting the water run down your fingers, not up your wrists.    Dry your hands well. Use a paper towel to turn off the faucet and open the door.  Using Alcohol-Based Hand   Alcohol-based hand  are also a good choice. Use them when you don t have access to soap and water. Follow these steps:    Squeeze about a tablespoon of gel into the palm of one hand.    Rub your hands together briskly, cleaning the backs of your hands, the palms, between your fingers, and up the wrists.    Rub until the gel is gone and your hands are completely dry.  Preventing Influenza in Healthcare Settings  The flu is a special concern for people in hospitals and long-term care facilities. To help prevent the spread of flu, many hospitals and nursing homes take these steps:    Health care providers wash their hands or use an alcohol-based hand  before and after treating each patient.    People with the flu have private rooms and  bathrooms or share a room with someone with the same infection.    High-risk patients who don t have the flu are encouraged to get the flu and pneumonia vaccines.    All health care workers are encouraged or required to get flu shots.        5206-0209 [employer]. [address] [place] [address]. All rights reserved. This information is not intended as a substitute for professional medical care. Always follow your healthcare professional's instructions.

## 2017-03-09 NOTE — ED AVS SNAPSHOT
[facility], [place], Emergency Department    [address] [place] [id number]    Phone:  [phone]                                       Andrew Lockwood   MRN: [id number]    Department:  [facility], Emergency Department   Date of Visit:  3/9/2017           After Visit Summary Signature Page     I have received my discharge instructions, and my questions have been answered. I have discussed any challenges I see with this plan with the nurse or doctor.    ..........................................................................................................................................  Patient/Patient Representative Signature      ..........................................................................................................................................  Patient Representative Print Name and Relationship to Patient    ..................................................               ................................................  Date                                            Time    ..........................................................................................................................................  Reviewed by Signature/Title    ...................................................              ..............................................  Date                                                            Time

## 2017-03-09 NOTE — ED AVS SNAPSHOT
[facility], Emergency Department    [address] [facility] [address]    Phone:  [phone]                                       Andrew Lockwood   MRN: [id number]    Department:  [facility], Emergency Department   Date of Visit:  3/9/2017           Patient Information     Date Of Birth          11/27/1967        Your diagnoses for this visit were:     Influenza B        You were seen by Braulio Wayne MD.        Discharge Instructions       Please make an appointment to follow up with Your Infectious Disease Provider in 1-2 days for further evaluation and recommendations.    Influenza (Adult)    Influenza is also called the flu. It is a viral illness that affects the air passages of your lungs. It is different from the common cold. The flu can easily be passed from one to person to another. It may be spread through the air by coughing and sneezing. Or it can be spread by touching the sick person and then touching your own eyes, nose, or mouth.  The flu starts 1 to 3 days after you are exposed to the flu virus. It may last for 1 to 2 weeks. You usually don t need to take antibiotics unless you have a complication. This might be an ear or sinus infection or pneumonia.  Symptoms of the flu may be mild or severe. They can include extreme tiredness (wanting to stay in bed all day), chills, fevers, muscle aches, soreness with eye movement, headache, and a dry, hacking cough.  Home care  Follow these guidelines when caring for yourself at home:    Avoid being around cigarette smoke, whether yours or other people s.    Acetaminophen or ibuprofen will help ease your fever, muscle aches, and headache. Don t give aspirin to anyone younger than 18 who has the flu. Aspirin can harm the liver.    Nausea and loss of appetite are common with the flu. Eat light meals. Drink 6 to 8 glasses of liquids every day. Good choices are water, sport drinks, soft drinks without caffeine, juices, tea, and soup. Extra fluids will  also help loosen secretions in your nose and lungs.    Over-the-counter cold medicines will not make the flu go away faster. But the medicines may help with coughing, sore throat, and congestion in your nose and sinuses. Don t use a decongestant if you have high blood pressure.    Stay home until your fever has been gone for at least 24 hours without using medicine to reduce fever.  Follow-up care  Follow up with your health care provider, or as advised, if you are not getting better over the next week.  If you are 65 or older, talk with your provider about getting a pneumococcal vaccine every 5 years. You should also get this vaccine if you have chronic asthma or COPD. All adults should get a flu vaccine every fall. Ask your provider about this.  When to seek medical advice  Call your health care provider right away if any of these occur:    Cough with lots of colored sputum (mucus) or blood in your sputum    Chest pain, shortness of breath, wheezing, or difficulty breathing    Severe headache, or face, neck, or ear pain    New rash with fever    Fever of 101 F (38 C) oral or higher that doesn t get better with fever medicine    Confusion, behavior change, or seizure    Severe weakness or dizziness    You get a fever or cough after getting better for a few days       1839-0573 [employer]. [address] [place] [address], [place] [address]. All rights reserved. This information is not intended as a substitute for professional medical care. Always follow your healthcare professional's instructions.            Future Appointments        Provider Department Dept Phone Center    3/10/2017 4:15 PM Aguila Ingram MD [facility] [phone] [facility]    3/20/2017 4:30 PM DAYSI Estrada [facility] [phone] [facility]    4/25/2017 8:00 AM Jonas Alan [facility] and [phone] [facility]      24 Hour Appointment Hotline       To make an appointment at any  [facility], call [phone] ([phone]). If you don't have a family doctor or clinic, we will help you find one. [facility] ([facility] are conveniently located to serve the needs of you and your family.             Review of your medicines      START taking        Dose / Directions Last dose taken    guaiFENesin-codeine 100-10 MG/5ML Soln solution   Commonly known as:  ROBITUSSIN AC   Dose:  5 mL   Quantity:  236 mL        Take 5 mLs by mouth every 4 hours as needed for cough   Refills:  0          Our records show that you are taking the medicines listed below. If these are incorrect, please call your family doctor or clinic.        Dose / Directions Last dose taken    AZITHROMYCIN PO   Dose:  600 mg        Take 600 mg by mouth once a week   Refills:  0        blood glucose lancets standard   Commonly known as:  no brand specified   Quantity:  100 Box        Use to test blood sugar four times daily or as directed.   Refills:  5        blood glucose monitoring meter device kit   Commonly known as:  no brand specified   Quantity:  1 kit        Use to test blood sugar four times daily or as directed.   Refills:  0        blood glucose monitoring test strip   Commonly known as:  no brand specified   Quantity:  100 each        Use to test blood sugars four times daily or as directed   Refills:  5        gabapentin 300 MG capsule   Commonly known as:  NEURONTIN   Quantity:  90 capsule        TAKE ONE CAPSULE BY MOUTH ON DAY 1, THEN TAKE ONE CAPSULE TWICE DAILY ON DAY 2 THEN TAKE ONE CAPSULE THREE TIMES A DAY.   Refills:  3        GENVOYA [phone] MG Tabs per tablet   Quantity:  30 tablet   Generic drug:  Hjxrlso-Tksim-Ezlquqkx-TenofAF        TAKE ONE TABLET BY MOUTH EVERY DAY   Refills:  11        hyoscyamine 0.125 MG tablet   Commonly known as:  ANASPAZ/LEVSIN   Dose:  0.125-0.25 mg   Quantity:  40 tablet        Take 1-2 tablets (125-250 mcg) by mouth every 4 hours as needed for cramping   Refills:  1         metFORMIN 500 MG tablet   Commonly known as:  GLUCOPHAGE   Quantity:  120 tablet        TAKE TWO TABLETS BY MOUTH TWICE A DAY WITH MEALS   Refills:  3        oseltamivir 75 MG capsule   Commonly known as:  TAMIFLU   Dose:  75 mg   Quantity:  10 capsule        Take 1 capsule (75 mg) by mouth 2 times daily   Refills:  0        PEPCID AC MAXIMUM STRENGTH PO        Refills:  0        RABEprazole 20 MG EC tablet   Commonly known as:  ACIPHEX   Dose:  20 mg   Quantity:  30 tablet        Take 1 tablet (20 mg) by mouth daily   Refills:  1        ranitidine 150 MG tablet   Commonly known as:  ZANTAC   Dose:  150 mg   Quantity:  60 tablet        Take 1 tablet (150 mg) by mouth 2 times daily as needed for heartburn   Refills:  1        sildenafil 50 MG cap/tab   Commonly known as:  VIAGRA   Dose:  25 mg   Quantity:  30 tablet        Take 0.5 tablets (25 mg) by mouth daily as needed for erectile dysfunction   Refills:  0        simethicone 80 MG chewable tablet   Commonly known as:  GAS RELIEF   Dose:  80 mg   Quantity:  30 tablet        Take 1 tablet (80 mg) by mouth every 6 hours as needed for flatulence or cramping   Refills:  0        sulfamethoxazole-trimethoprim 800-160 MG per tablet   Commonly known as:  BACTRIM DS/SEPTRA DS   Dose:  1 tablet        Take 1 tablet by mouth 2 times daily   Refills:  0        traMADol 50 MG tablet   Commonly known as:  ULTRAM   Dose:  50 mg   Quantity:  12 tablet        Take 1 tablet (50 mg) by mouth every 6 hours as needed for pain   Refills:  0                Prescriptions were sent or printed at these locations (1 Prescription)                   Other Prescriptions                Printed at Department/Unit printer (1 of 1)         guaiFENesin-codeine (ROBITUSSIN AC) 100-10 MG/5ML SOLN solution                Procedures and tests performed during your visit     Peripheral IV catheter    XR Chest 2 Views      Orders Needing Specimen Collection     None      Pending Results     No orders  found from 3/7/2017 to 3/10/2017.            Pending Culture Results     No orders found from 3/7/2017 to 3/10/2017.            Thank you for choosing [facility]       Thank you for choosing [facility] for your care. Our goal is always to provide you with excellent care. Hearing back from our patients is one way we can continue to improve our services. Please take a few minutes to complete the written survey that you may receive in the mail after you visit with us. Thank you!        [facility] Information     [employer] gives you secure access to your electronic health record. If you see a primary care provider, you can also send messages to your care team and make appointments. If you have questions, please call your primary care clinic.  If you do not have a primary care provider, please call [phone] and they will assist you.        Care EveryWhere ID     This is your Care EveryWhere ID. This could be used by other organizations to access your [facility] medical records  [id number]        After Visit Summary       This is your record. Keep this with you and show to your community pharmacist(s) and doctor(s) at your next visit.

## 2017-03-10 NOTE — ED PROVIDER NOTES
History     Chief Complaint   Patient presents with     Flu Symptoms     diagnosed yesterday. on tamiflu     HPI  Andrew Lockwood is a 49-year-old man with history of AIDS diagnosed with influenza B yesterday and started on Tamiflu presenting with a cough and body aches. Patient states his symptoms have not improved. He states that they have not worsened either. Does report subjective fevers. No nausea, vomiting or headache. He does have rhinorrhea, nasal congestion, and dry cough. He says that he has been Tamiflu as prescribed since yesterday.     I have reviewed the Medications, Allergies, Past Medical and Surgical History, and Social History in the [employer] system.  Past Medical History   Diagnosis Date     AIDS (H)      Allergic rhinitis due to other allergen      DNS     Chronic abdominal pain      CNS toxoplasmosis (H)      Diabetes type 2, controlled (H)      GERD (gastroesophageal reflux disease)      HIV (human immunodeficiency virus infection) (H)      Periungual wart      Sleep apnea      doesn't use CPAP       Past Surgical History   Procedure Laterality Date     C nonspecific procedure       right forearm fracture     Hc explore undesc testis,inguin/scrotal       Optical tracking system craniotomy, excise tumor, combined Left 4/10/2015     Procedure: COMBINED OPTICAL TRACKING SYSTEM CRANIOTOMY, EXCISE TUMOR;  Surgeon: Mirlande Colmenares MD;  Location: [facility] OR     Colonoscopy Left 1/22/2016     Procedure: COMBINED COLONOSCOPY, SINGLE OR MULTIPLE BIOPSY/POLYPECTOMY BY BIOPSY;  Surgeon: Clark Saini MD;  Location: [facility] GI     Laparoscopy diagnostic (general) N/A 7/26/2016     Procedure: LAPAROSCOPY DIAGNOSTIC (GENERAL);  Surgeon: Susannah Arriaga MD;  Location: [facility] OR     Repair ligament ulnar collateral thumb (gamekeeper's) Right 12/2/2016     Procedure: REPAIR LIGAMENT ULNAR COLLATERAL THUMB (GAMEKEEPER'S);  Surgeon: Evin Zamorano MD;  Location: [facility] OR       Family History   Problem Relation  Age of Onset     DIABETES Brother      DIABETES Father      Alzheimer Disease Father      Unknown/Adopted Mother      DIABETES Paternal Grandfather      CANCER No family hx of      no skin cancer     Skin Cancer No family hx of      no famiy hx of skin cancer       Social History   Substance Use Topics     Smoking status: Current Every Day Smoker     Packs/day: 0.25     Last attempt to quit: 10/28/2016     Smokeless tobacco: Former User      Comment: just smoked very rarely/socailly in the past     Alcohol use No      Comment: Last etoh in 2007     No current facility-administered medications for this encounter.      Current Outpatient Prescriptions   Medication     guaiFENesin-codeine (ROBITUSSIN AC) 100-10 MG/5ML SOLN solution     oseltamivir (TAMIFLU) 75 MG capsule     sildenafil (VIAGRA) 50 MG cap/tab     gabapentin (NEURONTIN) 300 MG capsule     Famotidine (PEPCID AC MAXIMUM STRENGTH PO)     RABEprazole (ACIPHEX) 20 MG EC tablet     ranitidine (ZANTAC) 150 MG tablet     hyoscyamine (ANASPAZ/LEVSIN) 0.125 MG tablet     metFORMIN (GLUCOPHAGE) 500 MG tablet     traMADol (ULTRAM) 50 MG tablet     GENVOYA [phone] MG PO TABS     simethicone (GAS RELIEF) 80 MG chewable tablet     AZITHROMYCIN PO     blood glucose (NO BRAND SPECIFIED) lancets standard     blood glucose monitoring (NO BRAND SPECIFIED) meter device kit     blood glucose monitoring (NO BRAND SPECIFIED) test strip     sulfamethoxazole-trimethoprim (BACTRIM DS,SEPTRA DS) 800-160 MG per tablet        Allergies   Allergen Reactions     Milk [Lac Bovis] Hives     Tylenol [Acetaminophen] Itching       Review of Systems   Constitutional: Positive for chills and fever.   HENT: Positive for congestion and rhinorrhea. Negative for sore throat.    Eyes: Negative for visual disturbance.   Respiratory: Positive for cough. Negative for shortness of breath.    Cardiovascular: Negative for chest pain.   Gastrointestinal: Negative for abdominal pain, nausea and  "vomiting.   Endocrine: Negative for polydipsia and polyuria.   Genitourinary: Negative for difficulty urinating.   Musculoskeletal: Negative for back pain, neck pain and neck stiffness.   Skin: Negative for color change.   Allergic/Immunologic: Positive for immunocompromised state.   Neurological: Negative for light-headedness.   Hematological: Negative for adenopathy. Does not bruise/bleed easily.   Psychiatric/Behavioral: Negative for agitation and behavioral problems.       Physical Exam   BP: 131/80  Pulse: 107  Temp: 99.8  F (37.7  C)  Resp: 18  Height: 162.6 cm (5' 4\")  Weight: 74.8 kg (165 lb)  SpO2: 98 %  Physical Exam   Constitutional: He is oriented to person, place, and time. He appears well-developed and well-nourished. No distress.   HENT:   Head: Normocephalic and atraumatic.   Right Ear: External ear normal.   Left Ear: External ear normal.   Nose: Nose normal.   Mouth/Throat: Oropharynx is clear and moist. No oropharyngeal exudate.   Eyes: Conjunctivae and EOM are normal. Pupils are equal, round, and reactive to light. No scleral icterus.   Neck: Normal range of motion. Neck supple.   Cardiovascular: Normal heart sounds and intact distal pulses.    tachycardia   Pulmonary/Chest: Effort normal and breath sounds normal. No respiratory distress. He has no wheezes. He has no rales.   Intermittent coughing.   Abdominal: Soft. Bowel sounds are normal. He exhibits no distension. There is no tenderness. There is no rebound and no guarding.   Musculoskeletal: Normal range of motion. He exhibits no edema or tenderness.   Neurological: He is alert and oriented to person, place, and time. No cranial nerve deficit. He exhibits normal muscle tone. Coordination normal.   Skin: Skin is warm. No rash noted. He is not diaphoretic.   Psychiatric: He has a normal mood and affect. His behavior is normal. Judgment and thought content normal.   Nursing note and vitals reviewed.      ED Course     ED Course     Procedures   "           Critical Care time:  none               Labs Ordered and Resulted from Time of ED Arrival Up to the Time of Departure from the ED - No data to display    Assessments & Plan (with Medical Decision Making)   49-year-old man with AIDS presenting with a cough. Differential diagnosis: influenza, pneumothorax, pneumonia.    After thorough history and physical exam patient appears to be in no acute distress. Oxygenation is 96-97% on room air which is normal. He does have a slightly elevated temperature at 99.8F. His blood pressure is normal and pulse is slightly elevated at 107. I will obtain an x-ray for diagnostic evaluation, hydrate him with a bolus of IV saline, treat him with a DuoNeb, and also treat him with the oral Robitussin with codeine for management of his cough. He agrees with this plan.    I reviewed the patient s chest x-ray and I read the radiology report; no evidence of pneumonia. Patient shows significant improvement of symptoms after emergency department treatment and at this point he is stable for discharge and I will give him a prescription for cough syrup. Recommended he follows up with his infectious disease specialist and return to the emergency department if his symptoms worsen. There is no evidence of a superimposed bacterial pneumonia at this point. Despite the patient s having AIDS I do not believe that he needs to be hospitalized at this time.     I have reviewed the nursing notes.    I have reviewed the findings, diagnosis, plan and need for follow up with the patient.  Discharge Medication List as of 3/9/2017  9:56 PM      START taking these medications    Details   guaiFENesin-codeine (ROBITUSSIN AC) 100-10 MG/5ML SOLN solution Take 5 mLs by mouth every 4 hours as needed for cough, Disp-236 mL, R-0, Local Print             Final diagnoses:   Influenza B       3/9/2017   [facility], [place], EMERGENCY DEPARTMENT     Braulio Wayne MD  03/09/17 0275

## 2017-03-10 NOTE — ED NOTES
48 yo male brought in via [facility] for ongoing flu symptoms. Pt was seen here yesterday and diagnosed with influenza A and given tamiflu. Patient has been taking tamiflu, but symptoms are unresolved. Pt now presents with fever, chills, and a cough. [facility].

## 2017-03-10 NOTE — DISCHARGE INSTRUCTIONS
Please make an appointment to follow up with Your Infectious Disease Provider in 1-2 days for further evaluation and recommendations.    Influenza (Adult)    Influenza is also called the flu. It is a viral illness that affects the air passages of your lungs. It is different from the common cold. The flu can easily be passed from one to person to another. It may be spread through the air by coughing and sneezing. Or it can be spread by touching the sick person and then touching your own eyes, nose, or mouth.  The flu starts 1 to 3 days after you are exposed to the flu virus. It may last for 1 to 2 weeks. You usually don t need to take antibiotics unless you have a complication. This might be an ear or sinus infection or pneumonia.  Symptoms of the flu may be mild or severe. They can include extreme tiredness (wanting to stay in bed all day), chills, fevers, muscle aches, soreness with eye movement, headache, and a dry, hacking cough.  Home care  Follow these guidelines when caring for yourself at home:    Avoid being around cigarette smoke, whether yours or other people s.    Acetaminophen or ibuprofen will help ease your fever, muscle aches, and headache. Don t give aspirin to anyone younger than 18 who has the flu. Aspirin can harm the liver.    Nausea and loss of appetite are common with the flu. Eat light meals. Drink 6 to 8 glasses of liquids every day. Good choices are water, sport drinks, soft drinks without caffeine, juices, tea, and soup. Extra fluids will also help loosen secretions in your nose and lungs.    Over-the-counter cold medicines will not make the flu go away faster. But the medicines may help with coughing, sore throat, and congestion in your nose and sinuses. Don t use a decongestant if you have high blood pressure.    Stay home until your fever has been gone for at least 24 hours without using medicine to reduce fever.  Follow-up care  Follow up with your health care provider, or as advised,  if you are not getting better over the next week.  If you are 65 or older, talk with your provider about getting a pneumococcal vaccine every 5 years. You should also get this vaccine if you have chronic asthma or COPD. All adults should get a flu vaccine every fall. Ask your provider about this.  When to seek medical advice  Call your health care provider right away if any of these occur:    Cough with lots of colored sputum (mucus) or blood in your sputum    Chest pain, shortness of breath, wheezing, or difficulty breathing    Severe headache, or face, neck, or ear pain    New rash with fever    Fever of 101 F (38 C) oral or higher that doesn t get better with fever medicine    Confusion, behavior change, or seizure    Severe weakness or dizziness    You get a fever or cough after getting better for a few days       [id number] [employer]. [address] [place] [address], [place] [address]. All rights reserved. This information is not intended as a substitute for professional medical care. Always follow your healthcare professional's instructions.

## 2017-03-14 DIAGNOSIS — B20 HUMAN IMMUNODEFICIENCY VIRUS (HIV) DISEASE (H): Primary | ICD-10-CM

## 2017-03-14 NOTE — ASSESSMENT & PLAN NOTE
Continue Genvoya and Bactrim.  Follow up within 3 months or sooner as needed. Safety and surrogate markers are up to date.

## 2017-03-14 NOTE — PROGRESS NOTES
[facility] - HIV Progress Note  Dr. Sharon Rosado, [facility], [facility], [address] [place] [address], Sixth Floor, [address]  [place] [address]  Patient:  Andrew Lockwood, Date of birth 11/27/1967, Medical record number [id number]  Date of Visit: Mar 2, 2017         Assessment and Recommendations:     Human immunodeficiency virus (HIV) disease (H)  Continue Genvoya and Bactrim.  Follow up within 3 months or sooner as needed. Safety and surrogate markers are up to date.     DMII   Being followed by Ms. Dean in primary care    High risk injection drug exposure  I counseled Andrew extensively on this today - both with reference to his personal risk of new exposures to drug resistant HIV and viral hepatitis, but also on the risk that he brings to any person that he shares needles with.  I did discuss PrEP for his partner.  He will discuss it although he is concerned that his partner is already HIV positive.  He reports that his partner is well aware of Andrew's status.  I will check Andrew for Hepatitis C Ab and DNA today.     Erectile dysfunction  I had previously prescribed Cialas and Andrew lost the script.  He is interested in Viagra today.  I will give him a limited script, he was again counseled that he has to start with a half dose based on his Genvoya interaction.  He verbalized an understanding of this and what to do if he has an erection lasting longer than 4 hours.  We had an extensive discussion today on the fact that he has a charted history of penile prosthesis from [employer] (which even includes the serial number). He firmly denies this. I strongly encouraged him to see Urology her at the [place].  He will consider this.    Preventative health care  Cardiovascular Juan Jose -               Lipids - CHOL 180, LDL 72 (8/18/2016)              Blood Pressure - /85 today  Cancer  Screening              Colon - Up to date.  [facility]   Anal - Will check anal pap today  Immunizations              Hepatitis A - immune, serology 4/13/15              Hepatitis B - 6/16/2016, 3/10/2016, due for third              Influenza - Up To date              Pneumovax/Prevnar - Up to date               Tdap - 6/8/14 per [facility]              Menactra - Up to date.    Renal Health -  UA without proteinuria and creatinine normal in 12/2016.    Sexually Transmitted Infection Risk and Screening              Gonorrhea and Chlamydia - GC/chlamydia negative on 6/2016, will check today              Syphilis - negative on 6/2016, will check today    I spent more than 40 minutes today in face to face time managing the care of Andrew Lockwood.  Over 50% of my time on the unit was spent counseling the patient and/or coordinating care regarding services listed in this note.    Sharon Rosado MD  Division of Infectious Diseases and International Medicine  [phone]         History of Infectious Disease Illness:   Mr. Lockwood is a 49 year old man who I follow for HIV. He comes in for routine follow up. At our last visit he had some serious concerns about his living situation and was considering moving out despite the fact that he did not have an alternative place to go.  Today, he is feeling better about this.  He is interested in discussing the relationship that is developing between him and a new partner.  Since I have known Andrew, he has been reluctant to discuss current or previous sexual activity with men.  However, he is interested in this man and is now discussing marriage.  Of note, this partner has active opioid addiction and Andrew for the first time a couple weeks ago used injection drugs and shared a needle.  He did not like the experience and is very worried about exposures to things like Hepatitis C. Andrew is also interested in obtaining STI testing and an anal pap today. He reports a high level of adherence  to his ARVs.  He also continues on Bactrim prophylaxis.         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 They did not live together.  Historical use of ARVs: Triumeq, Genvoya  Historical Resistance Mutations: Susceptible        Past Medical and Surgical History:     Past Medical History   Diagnosis Date     AIDS (H)      Allergic rhinitis due to other allergen      DNS     Chronic abdominal pain      CNS toxoplasmosis (H)      Diabetes type 2, controlled (H)      GERD (gastroesophageal reflux disease)      HIV (human immunodeficiency virus infection) (H)      Periungual wart      Sleep apnea      doesn't use CPAP       Past Surgical History   Procedure Laterality Date     C nonspecific procedure       right forearm fracture     Hc explore undesc testis,inguin/scrotal       Optical tracking system craniotomy, excise tumor, combined Left 4/10/2015     Procedure: COMBINED OPTICAL TRACKING SYSTEM CRANIOTOMY, EXCISE TUMOR;  Surgeon: Mirlande Colmenares MD;  Location: [facility] OR     Colonoscopy Left 1/22/2016     Procedure: COMBINED COLONOSCOPY, SINGLE OR MULTIPLE BIOPSY/POLYPECTOMY BY BIOPSY;  Surgeon: Clark Saini MD;  Location: [facility] GI     Laparoscopy diagnostic (general) N/A 7/26/2016     Procedure: LAPAROSCOPY DIAGNOSTIC (GENERAL);  Surgeon: Susannah Arriaga MD;  Location: [facility] OR     Repair ligament ulnar collateral thumb (gamekeeper's) Right 12/2/2016     Procedure: REPAIR LIGAMENT ULNAR COLLATERAL THUMB (GAMEKEEPER'S);  Surgeon: Evin Zamorano MD;  Location: [facility] OR           Family History:     Family History   Problem Relation  Age of Onset     DIABETES Brother      DIABETES Father      Alzheimer Disease Father      Unknown/Adopted Mother      DIABETES Paternal Grandfather      CANCER No family hx of      no skin cancer     Skin Cancer No family hx of      no famiy hx of skin cancer           Social History:     Social History   Substance Use Topics     Smoking status: Current Every Day Smoker     Packs/day: 0.25     Last attempt to quit: 10/28/2016     Smokeless tobacco: Former User      Comment: just smoked very rarely/socailly in the past     Alcohol use No      Comment: Last etoh in 2007     Social History     Social History Narrative    Born in [facility].  Came to the [place] in 1993.  Last traveled to visit family in 2008.                 Review of Systems:   CONSTITUTIONAL: negative fevers or chills   EYES: negative for icterus, vision is stable  ENT: negative for hearing loss, sore throat   RESPIRATORY: negative for cough, sputum or dyspnea   CARDIOVASCULAR: negative for chest pain, palpitations   GASTROINTESTINAL: negative for nausea, vomiting, diarrhea  GENITOURINARY: negative for dysuria, no genital lesions   HEME: No easy bruising   INTEGUMENT: negative for or pruritus, great improvement in periungal warts  NEURO: Negative for headache         Current Medications:     Current Outpatient Prescriptions   Medication Sig Dispense Refill     sildenafil (VIAGRA) 50 MG cap/tab Take 0.5 tablets (25 mg) by mouth daily as needed for erectile dysfunction 30 tablet 0     gabapentin (NEURONTIN) 300 MG capsule TAKE ONE CAPSULE BY MOUTH ON DAY 1, THEN TAKE ONE CAPSULE TWICE DAILY ON DAY 2 THEN TAKE ONE CAPSULE THREE TIMES A DAY. 90 capsule 3     Famotidine (PEPCID AC MAXIMUM STRENGTH PO)        RABEprazole (ACIPHEX) 20 MG EC tablet Take 1 tablet (20 mg) by mouth daily 30 tablet 1     ranitidine (ZANTAC) 150 MG tablet Take 1 tablet (150 mg) by mouth 2 times daily as needed for heartburn 60 tablet 1     hyoscyamine (ANASPAZ/LEVSIN) 0.125 MG  tablet Take 1-2 tablets (125-250 mcg) by mouth every 4 hours as needed for cramping 40 tablet 1     metFORMIN (GLUCOPHAGE) 500 MG tablet TAKE TWO TABLETS BY MOUTH TWICE A DAY WITH MEALS 120 tablet 3     traMADol (ULTRAM) 50 MG tablet Take 1 tablet (50 mg) by mouth every 6 hours as needed for pain 12 tablet 0     GENVOYA [phone] MG PO TABS TAKE ONE TABLET BY MOUTH EVERY DAY (Patient taking differently: TAKE ONE TABLET BY MOUTH EVERY DAY (at bedtime)) 30 tablet 11     simethicone (GAS RELIEF) 80 MG chewable tablet Take 1 tablet (80 mg) by mouth every 6 hours as needed for flatulence or cramping 30 tablet 0     AZITHROMYCIN PO Take 600 mg by mouth once a week       blood glucose (NO BRAND SPECIFIED) lancets standard Use to test blood sugar four times daily or as directed. 100 Box 5     blood glucose monitoring (NO BRAND SPECIFIED) meter device kit Use to test blood sugar four times daily or as directed. 1 kit 0     blood glucose monitoring (NO BRAND SPECIFIED) test strip Use to test blood sugars four times daily or as directed 100 each 5     sulfamethoxazole-trimethoprim (BACTRIM DS,SEPTRA DS) 800-160 MG per tablet Take 1 tablet by mouth 2 times daily       guaiFENesin-codeine (ROBITUSSIN AC) 100-10 MG/5ML SOLN solution Take 5 mLs by mouth every 4 hours as needed for cough 236 mL 0     oseltamivir (TAMIFLU) 75 MG capsule Take 1 capsule (75 mg) by mouth 2 times daily 10 capsule 0            Immunization History:     Immunization History   Administered Date(s) Administered     Hepatitis B 03/10/2016, 06/16/2016     Influenza (IIV3) 10/17/2016     Influenza Vaccine IM 3yrs+ 4 Valent IIV4 12/22/2015     Mantoux 04/12/2015     Meningococcal (Menactra ) 08/31/2015, 03/10/2016     Pneumococcal (PCV 13) 03/10/2016     Pneumococcal 23 valent 06/16/2016     TDAP (BOOSTRIX AGES 10-64) 10/17/2016            Allergies:     Allergies   Allergen Reactions     Milk [Lac Bovis] Hives     Tylenol [Acetaminophen] Itching            " Physical Exam:   /85  Pulse 112  Temp 98.1  F (36.7  C) (Oral)  Ht 1.626 m (5' 4\")  Wt 74.8 kg (165 lb)  BMI 28.32 kg/m2    Physical Examination:         Physical Exam:   /85  Pulse 112  Temp 98.1  F (36.7  C) (Oral)  Ht 1.626 m (5' 4\")  Wt 74.8 kg (165 lb)  BMI 28.32 kg/m2  Physical Examination:  GENERAL:  well-developed, well-nourished, seated in no acute distress.  HEENT:  Head is normocephalic, atraumatic   EYES:  Eyes have anicteric sclerae without conjunctival injection   ENT:  Oropharynx is moist without exudates or ulcers. Tongue is midline  NECK:  Supple. No cervical lymphadenopathy  LUNGS:  Clear to auscultation bilateral.   CARDIOVASCULAR:  Regular rate and rhythm with no murmurs, gallops or rubs.  ABDOMEN:  Normal bowel sounds, soft, nontender.   Rectal: no external ulcers, nonthrombosed hemorrhoid   SKIN:  No acute rashes. Significant regression of warts surrounding nails on bilateral hands  NEUROLOGIC:  Grossly nonfocal. Active x4 extremities          Laboratory Data:     CD4 Count  Absolute CD4   Date Value Ref Range Status   02/22/2017 183 (L) 441 - 2156 cells/uL Final     CD4 [place] Arc T   Date Value Ref Range Status   02/22/2017 8 (L) 28 - 63 % Final     CD4:CD8 Ratio   Date Value Ref Range Status   02/22/2017 0.30 (L) 1.40 - 2.60 Final       HIV-1 RNA Quantitative:  HIV-1 RNA Quant Result   Date Value Ref Range Status   02/22/2017 (A) HIVND [Copies]/mL Final    <20  HIV-1 RNA Detected, less than 20 HIV-1 RNA copies/mL   The DAMON AmpliPrep/DAMON TaqMan HIV-1 test is an FDA-approved in vitro nucleic   acid amplification test for the quantitation of HIV-1 RNA in human plasma (EDTA   plasma) using the DAMON AmpliPrep instrument for automated viral nucleic acid   extraction and the DAMON TaqMan Analyzer or DAMON TaqMan for automated Real   Time PCR amplification and detection of the viral nucleic acid target.   Titer results are reported in copies/ml. This assay is intended for use in   " conjunction with clinical presentation and other laboratory markers of disease   prognosis and for use as an aid in assessing viral response to antiretroviral   treatment as measured by changes in plasma HIV-1 RNA levels. This test should   not be used as a donor screening test to confirm the presence of HIV-1   infection.         Metabolic Studies       Sodium   Date Value Ref Range Status   03/08/2017 140 133 - 144 mmol/L Final   02/22/2017 140 133 - 144 mmol/L Final     Potassium   Date Value Ref Range Status   03/08/2017 3.6 3.4 - 5.3 mmol/L Final   02/22/2017 3.8 3.4 - 5.3 mmol/L Final     Chloride   Date Value Ref Range Status   03/08/2017 106 94 - 109 mmol/L Final   02/22/2017 107 94 - 109 mmol/L Final     Carbon Dioxide   Date Value Ref Range Status   03/08/2017 24 20 - 32 mmol/L Final   02/22/2017 25 20 - 32 mmol/L Final     Anion Gap   Date Value Ref Range Status   03/08/2017 10 3 - 14 mmol/L Final   02/22/2017 8 3 - 14 mmol/L Final     Urea Nitrogen   Date Value Ref Range Status   03/08/2017 16 7 - 30 mg/dL Final   02/22/2017 10 7 - 30 mg/dL Final     Creatinine   Date Value Ref Range Status   03/08/2017 1.03 0.66 - 1.25 mg/dL Final   02/22/2017 0.86 0.66 - 1.25 mg/dL Final     GFR Estimate   Date Value Ref Range Status   03/08/2017 76 >60 mL/min/1.7m2 Final     Comment:     Non  GFR Calc   02/22/2017 >90  Non  GFR Calc   >60 mL/min/1.7m2 Final     Glucose   Date Value Ref Range Status   03/08/2017 143 (H) 70 - 99 mg/dL Final   02/22/2017 97 70 - 99 mg/dL Final     Hemoglobin A1C   Date Value Ref Range Status   01/23/2017 6.4 (H) 4.3 - 6.0 % Final     Calcium   Date Value Ref Range Status   03/08/2017 8.4 (L) 8.5 - 10.1 mg/dL Final   02/22/2017 9.0 8.5 - 10.1 mg/dL Final     Phosphorus   Date Value Ref Range Status   12/26/2016 2.9 2.5 - 4.5 mg/dL Final     Magnesium   Date Value Ref Range Status   12/28/2016 2.0 1.6 - 2.3 mg/dL Final   12/26/2016 1.9 1.6 - 2.3 mg/dL Final      Lactic Acid   Date Value Ref Range Status   12/26/2016 1.6 0.7 - 2.1 mmol/L Final   11/06/2016 1.5 0.7 - 2.1 mmol/L Final       Hepatic Studies    Bilirubin Total   Date Value Ref Range Status   03/08/2017 0.4 0.2 - 1.3 mg/dL Final   02/22/2017 0.5 0.2 - 1.3 mg/dL Final     Alkaline Phosphatase   Date Value Ref Range Status   03/08/2017 87 40 - 150 U/L Final   02/22/2017 77 40 - 150 U/L Final     Albumin   Date Value Ref Range Status   03/08/2017 3.6 3.4 - 5.0 g/dL Final   02/22/2017 3.8 3.4 - 5.0 g/dL Final     AST   Date Value Ref Range Status   03/08/2017 17 0 - 45 U/L Final   02/22/2017 19 0 - 45 U/L Final     ALT   Date Value Ref Range Status   03/08/2017 23 0 - 70 U/L Final   02/22/2017 26 0 - 70 U/L Final       Hematology Studies      WBC   Date Value Ref Range Status   03/08/2017 4.9 4.0 - 11.0 10e9/L Final   02/22/2017 6.3 4.0 - 11.0 10e9/L Final     Absolute Neutrophil   Date Value Ref Range Status   03/08/2017 2.6 1.6 - 8.3 10e9/L Final   02/22/2017 3.2 1.6 - 8.3 10e9/L Final     Absolute Lymphocytes   Date Value Ref Range Status   03/08/2017 1.3 0.8 - 5.3 10e9/L Final   02/22/2017 2.4 0.8 - 5.3 10e9/L Final     Absolute Monocytes   Date Value Ref Range Status   03/08/2017 0.7 0.0 - 1.3 10e9/L Final   02/22/2017 0.4 0.0 - 1.3 10e9/L Final     Absolute Eosinophils   Date Value Ref Range Status   03/08/2017 0.2 0.0 - 0.7 10e9/L Final   02/22/2017 0.1 0.0 - 0.7 10e9/L Final     Hemoglobin   Date Value Ref Range Status   03/08/2017 13.4 13.3 - 17.7 g/dL Final   02/22/2017 14.0 13.3 - 17.7 g/dL Final     Hematocrit   Date Value Ref Range Status   03/08/2017 39.6 (L) 40.0 - 53.0 % Final   02/22/2017 40.8 40.0 - 53.0 % Final     Platelet Count   Date Value Ref Range Status   03/08/2017 212 150 - 450 10e9/L Final   02/22/2017 264 150 - 450 10e9/L Final       Hepatitis B Testing     Recent Labs   Lab Test  04/13/15   0816   HBCAB  Nonreactive   HEPBANG  Nonreactive   HBCM  Nonreactive   A nonreactive result  suggests lack of recent exposure to the virus in the   preceding 6 months.     HBEABY  Negative  Reference range: Negative  (Note)  Performed by [facility],  [address] [place] [address] [phone]  [email or web address], Nayan Corley MD, Lab. Director     HBEAGN  Negative  Reference range: Negative  (Note)  Performed by [facility],  [address] [facility] [address] [phone]  [email or web address], Nayan Corley MD, Lab. Director         Hepatitis C Testing     Hepatitis C Antibody   Date Value Ref Range Status   03/02/2017  NR Final    Nonreactive   Assay performance characteristics have not been established for newborns,   infants, and children     04/13/2015  NR Final    Nonreactive   Assay performance characteristics have not been established for newborns,   infants, and children       Imaging:  Recent Results (from the past 744 hour(s))   XR Abdomen 2 Views    Narrative    EXAM: XR ABDOMEN 2 VW  2/24/2017 2:38 AM      HISTORY: abd pain    COMPARISON: 2/11/2017    FINDINGS: Supine and upright radiographs of the abdomen.  Nonobstructive bowel gas pattern. No radio opaque stones. Mild to  moderate colonic stool load. No evidence of pneumoperitoneum.       Impression    IMPRESSION: Nonobstructive bowel gas pattern with mild to moderate  colonic stool burden.         I have personally reviewed the examination and initial interpretation  and I agree with the findings.    NAYAN LEONG MD   XR Chest 2 Views    Narrative    PA and lateral chest    HISTORY: Cough body aches headaches    COMPARISON STUDY: 11/29/2016.    FINDINGS: Cardiac silhouette is nonenlarged. Lungs clear.      Impression    IMPRESSION: Clear chest    JOSEFINA GALAN MD   XR Chest 2 Views    Narrative    EXAM: XR CHEST 2 VW  3/9/2017 7:58 PM     HISTORY:  cough       COMPARISON:  3/8/2017    FINDINGS: PA and lateral radiographs of the chest. The mediastinal  border, cardiac silhouette, and pulmonary vasculature are within  normal  limits. No focal airspace opacities. No pneumothorax. No  pleural effusions.      Impression    IMPRESSION: No focal airspace disease.     I have personally reviewed the examination and initial interpretation  and I agree with the findings.    ALFREDA SHIPLEY MD

## 2017-03-20 ENCOUNTER — THERAPY VISIT (OUTPATIENT)
Dept: OCCUPATIONAL THERAPY | Facility: CLINIC | Age: 52
End: 2017-03-20
Payer: COMMERCIAL

## 2017-03-20 DIAGNOSIS — S63.641A RUPTURE OF ULNAR COLLATERAL LIGAMENT OF RIGHT THUMB: ICD-10-CM

## 2017-03-20 DIAGNOSIS — M79.644 PAIN OF RIGHT THUMB: Primary | ICD-10-CM

## 2017-03-20 DIAGNOSIS — Z47.89 AFTERCARE FOLLOWING SURGERY OF THE MUSCULOSKELETAL SYSTEM: ICD-10-CM

## 2017-03-20 PROCEDURE — 97165 OT EVAL LOW COMPLEX 30 MIN: CPT | Mod: GO | Performed by: OCCUPATIONAL THERAPIST

## 2017-03-20 PROCEDURE — 29130 APPL FINGER SPLINT STATIC: CPT | Mod: GO | Performed by: OCCUPATIONAL THERAPIST

## 2017-03-20 PROCEDURE — 97110 THERAPEUTIC EXERCISES: CPT | Mod: GO | Performed by: OCCUPATIONAL THERAPIST

## 2017-03-20 NOTE — PROGRESS NOTES
Hand Therapy Initial Evaluation  Current Date:  3/20/2017    Diagnosis: R thumb UCL injury  Procedure: UCL ligament repair  DOS: 12/2/17    Post: 3 and a half months status post.     Subjective:  Andrew Lockwood is a 49 year old right hand dominant male.    Patient reports symptoms of pain and swelling of the right thumb  which occurred due to a ligament tear post attack.. Previous treatment:consists of surgery on 12/2/17  followed by casting for 6 weeks. The post surgical cast was removed and  He was given an off the shelf thumb spica on 1/18/17. The patient states he was told in early February he could remove the off the shelf thumb spica. However there is not record of a visit stating that in his chart review. He is is listed as a no show with Dr. Zamorano February 3rd and has not make any further subsequent appointments for his hand since that time. Today his thumb ROM appears to be WNL, however he is complaining of significant unrelenting pain of 9/10 in the vicinity of the UCL repair when he uses his hand and 8/10 pain at rest. The region is visibly swollen and tender to palpation. The patient appears to be unclear on the sequence of events after 1/198/17. He states he was told he did not have to wear the splint any more in February, however his appointment history listed him as a no show on February when he was supposed to go back to Dr. Zamorano as a follow up, and there is no note stating that.   General health as reported by patient is fair.  Pertinent medical history includes:history of fractures, diabetes, high blood pressure, HIV.  Medical allergies:seafood.  Surgical history: none.  Medication history: high blood pressure, Metformin.    Current occupation is not working   Barriers include:transportation  Prior functional level:  no limitations    Additional Occupational Profile Information (patterns of daily living, interests, values and needs): unable to grasp and hold objects with out significant pain.      Upper Extremity Functional Index Score:  SCORE:   Column Totals: /80: 47   (A lower score indicates greater disability.)    Objective:  Pain Level Report  VAS(0-10) 3/20/2017   At Rest: 7-8/10   With Use: 9-10/10     Report of Pain:  Location:  Thumb R ulnar corner   Pain Quality:  Burning, Dull and Throbbing  Frequency: constant    Pain is worst:  Same   Exacerbated by:  Gripping   Relieved by:  rest  Progression:  Unchanged   Edema:  MILD at UCL R thumb   Palpation: tender over UCL of R thumb   Sensation: WNL throughout all nerve distributions; per patient report    ROM: Thumb AROM (PROM) wnl     and Pinch Strength (pounds)  3/20 Date:2017  3/20   Left    Side  Right   60  55  50  55        # 1                # 2                # 3       Average 60++  40++  40++   10  12  12  11  3 Point   #1                #2                #3        Average 12  12  12  12       Assessment:  Patient presents with symptoms consistent with diagnosis of R thumb pain at UCL status post repair ,  with surgical  intervention.     Patient's limitations or Problem List includes:  Pain and Increased edema of the right thumb which interferes with the patient's ability to perform Self Care Tasks (dressing, eating, bathing), Sleep Patterns, Recreational Activities, Household Chores and Driving  as compared to previous level of function.    Rehab Potential:  Good - Return to full activity, some limitations    Patient will benefit from skilled Occupational Therapy to decrease pain to return to previous activity level and resume normal daily tasks and to reach their rehab potential.    Barriers to Learning:  No barrier    Communication Issues:  Patient appears to be able to clearly communicate and understand verbal and written communication and follow directions correctly.    Assessment of Occupational Performance:  5 or more Performance Deficits  Identified Performance Deficits: bathing/showering, health management and maintenance,  home establishment and management, meal preparation and cleanup and shopping      Clinical Decision Making (Complexity): Low complexity    Treatment Explanation:  The following has been discussed with the patient:  RX ordered/plan of care  Anticipated outcomes  Possible risks and side effects    Plan:  Frequency:  1 X week, every 2 weeks once daily  Duration:  for 12 weeks    Treatment Plan:   Therapeutic Exercise:  Strengthening adductor mm  Orthotic Fabrication:  Hand based orthosis  Discharge Plan:  Achieve all LTG.  Independent in home treatment program.  Reach maximal therapeutic benefit.    Home Exercise Program:  R HB TSS custom prn for pain control  Strengthening adductor mm by holding card between thumb and IF and trying to pull it out     Next Visit:  Patient asked to return to MD for evaluation of UCL swelling and pain .   Strengthen as tolerated add PTrx

## 2017-03-20 NOTE — MR AVS SNAPSHOT
After Visit Summary   3/20/2017    Andrew Lockwood    MRN: [id number]           Patient Information     Date Of Birth          11/27/1967        Visit Information        Provider Department      3/20/2017 4:30 PM Shabnam Saldaña OT [facility] Hand Therapy        Today's Diagnoses     Pain of right thumb    -  1    Rupture of ulnar collateral ligament of right thumb        Aftercare following surgery of the musculoskeletal system           Follow-ups after your visit        Your next 10 appointments already scheduled     Apr 25, 2017  8:00 AM CDT   (Arrive by 7:45 AM)   New Patient Visit with Jonas Alan   [facility] Gastroenterology and IBD ([facility])    [address]  [address] [facility] [address] [phone]              Who to contact     If you have questions or need follow up information about today's clinic visit or your schedule please contact [facility] directly at [phone].  Normal or non-critical lab and imaging results will be communicated to you by [employer], letter or phone within 4 business days after the clinic has received the results. If you do not hear from us within 7 days, please contact the clinic through [employer] or phone. If you have a critical or abnormal lab result, we will notify you by phone as soon as possible.  Submit refill requests through [employer] or call your pharmacy and they will forward the refill request to us. Please allow 3 business days for your refill to be completed.          Additional Information About Your Visit        MyChart Information     [employer] gives you secure access to your electronic health record. If you see a primary care provider, you can also send messages to your care team and make appointments. If you have questions, please call your primary care clinic.  If you do not have a primary care provider, please call [phone] and they will assist you.        Care EveryWhere ID     This is your  Care EveryWhere ID. This could be used by other organizations to access your [place] medical records  [id number]         Blood Pressure from Last 3 Encounters:   03/09/17 105/65   03/08/17 97/78   03/02/17 131/85    Weight from Last 3 Encounters:   03/09/17 74.8 kg (165 lb)   03/08/17 74.8 kg (165 lb)   03/02/17 74.8 kg (165 lb)              We Performed the Following     APPLY FINGER SPLINT STATIC     HC OT EVAL, LOW COMPLEXITY     THERAPEUTIC EXERCISES          Today's Medication Changes          These changes are accurate as of: 3/20/17  6:31 PM.  If you have any questions, ask your nurse or doctor.               These medicines have changed or have updated prescriptions.        Dose/Directions    GENVOYA [phone] MG Tabs per tablet   This may have changed:  See the new instructions.   Used for:  Human immunodeficiency virus (HIV) disease (H)   Generic drug:  Tjeuyxr-Pcjpq-Tkuofchg-TenofAF        TAKE ONE TABLET BY MOUTH EVERY DAY   Quantity:  30 tablet   Refills:  11                Primary Care Provider Office Phone # Fax #    Sharon Rosado [phone]       [address] [address]        Thank you!     Thank you for choosing [facility]ERAP[facility]  for your care. Our goal is always to provide you with excellent care. Hearing back from our patients is one way we can continue to improve our services. Please take a few minutes to complete the written survey that you may receive in the mail after your visit with us. Thank you!             Your Updated Medication List - Protect others around you: Learn how to safely use, store and throw away your medicines at www.disposemymeds.org.          This list is accurate as of: 3/20/17  6:31 PM.  Always use your most recent med list.                   Brand Name Dispense Instructions for use    AZITHROMYCIN PO      Take 600 mg by mouth once a week       blood glucose lancets standard    no brand  specified    100 Box    Use to test blood sugar four times daily or as directed.       blood glucose monitoring meter device kit    no brand specified    1 kit    Use to test blood sugar four times daily or as directed.       blood glucose monitoring test strip    no brand specified    100 each    Use to test blood sugars four times daily or as directed       gabapentin 300 MG capsule    NEURONTIN    90 capsule    TAKE ONE CAPSULE BY MOUTH ON DAY 1, THEN TAKE ONE CAPSULE TWICE DAILY ON DAY 2 THEN TAKE ONE CAPSULE THREE TIMES A DAY.       GENVOYA [phone] MG Tabs per tablet   Generic drug:  Xivuudx-Cbgbi-Jrhienco-TenofAF     30 tablet    TAKE ONE TABLET BY MOUTH EVERY DAY       guaiFENesin-codeine 100-10 MG/5ML Soln solution    ROBITUSSIN AC    236 mL    Take 5 mLs by mouth every 4 hours as needed for cough       hyoscyamine 0.125 MG tablet    ANASPAZ/LEVSIN    40 tablet    Take 1-2 tablets (125-250 mcg) by mouth every 4 hours as needed for cramping       metFORMIN 500 MG tablet    GLUCOPHAGE    120 tablet    TAKE TWO TABLETS BY MOUTH TWICE A DAY WITH MEALS       oseltamivir 75 MG capsule    TAMIFLU    10 capsule    Take 1 capsule (75 mg) by mouth 2 times daily       PEPCID AC MAXIMUM STRENGTH PO          RABEprazole 20 MG EC tablet    ACIPHEX    30 tablet    Take 1 tablet (20 mg) by mouth daily       ranitidine 150 MG tablet    ZANTAC    60 tablet    Take 1 tablet (150 mg) by mouth 2 times daily as needed for heartburn       sildenafil 50 MG cap/tab    VIAGRA    30 tablet    Take 0.5 tablets (25 mg) by mouth daily as needed for erectile dysfunction       simethicone 80 MG chewable tablet    GAS RELIEF    30 tablet    Take 1 tablet (80 mg) by mouth every 6 hours as needed for flatulence or cramping       sulfamethoxazole-trimethoprim 800-160 MG per tablet    BACTRIM DS/SEPTRA DS     Take 1 tablet by mouth 2 times daily       traMADol 50 MG tablet    ULTRAM    12 tablet    Take 1 tablet (50 mg) by mouth every 6  hours as needed for pain

## 2017-03-26 ENCOUNTER — HOSPITAL ENCOUNTER (EMERGENCY)
Facility: CLINIC | Age: 52
Discharge: HOME | End: 2017-03-26
Attending: EMERGENCY MEDICINE | Admitting: EMERGENCY MEDICINE
Payer: COMMERCIAL

## 2017-03-26 VITALS
RESPIRATION RATE: 14 BRPM | SYSTOLIC BLOOD PRESSURE: 114 MMHG | DIASTOLIC BLOOD PRESSURE: 70 MMHG | TEMPERATURE: 98.1 F | OXYGEN SATURATION: 94 %

## 2017-03-26 DIAGNOSIS — R10.13 ABDOMINAL PAIN, EPIGASTRIC: ICD-10-CM

## 2017-03-26 LAB
ALBUMIN SERPL-MCNC: 3.6 G/DL (ref 3.4–5)
ALP SERPL-CCNC: 96 U/L (ref 40–150)
ALT SERPL W P-5'-P-CCNC: 21 U/L (ref 0–70)
ANION GAP SERPL CALCULATED.3IONS-SCNC: 9 MMOL/L (ref 3–14)
AST SERPL W P-5'-P-CCNC: 15 U/L (ref 0–45)
BILIRUB SERPL-MCNC: 0.3 MG/DL (ref 0.2–1.3)
BUN SERPL-MCNC: 17 MG/DL (ref 7–30)
CALCIUM SERPL-MCNC: 9.1 MG/DL (ref 8.5–10.1)
CHLORIDE SERPL-SCNC: 107 MMOL/L (ref 94–109)
CO2 SERPL-SCNC: 26 MMOL/L (ref 20–32)
CREAT SERPL-MCNC: 0.92 MG/DL (ref 0.66–1.25)
GFR SERPL CREATININE-BSD FRML MDRD: 87 ML/MIN/1.7M2
GLUCOSE SERPL-MCNC: 108 MG/DL (ref 70–99)
LIPASE SERPL-CCNC: 227 U/L (ref 73–393)
POTASSIUM SERPL-SCNC: 3.7 MMOL/L (ref 3.4–5.3)
PROT SERPL-MCNC: 7.2 G/DL (ref 6.8–8.8)
SODIUM SERPL-SCNC: 142 MMOL/L (ref 133–144)

## 2017-03-26 PROCEDURE — 99283 EMERGENCY DEPT VISIT LOW MDM: CPT | Performed by: EMERGENCY MEDICINE

## 2017-03-26 PROCEDURE — 99284 EMERGENCY DEPT VISIT MOD MDM: CPT | Mod: Z6 | Performed by: EMERGENCY MEDICINE

## 2017-03-26 PROCEDURE — 25000132 ZZH RX MED GY IP 250 OP 250 PS 637: Performed by: EMERGENCY MEDICINE

## 2017-03-26 PROCEDURE — 83690 ASSAY OF LIPASE: CPT | Performed by: EMERGENCY MEDICINE

## 2017-03-26 PROCEDURE — 25000125 ZZHC RX 250: Performed by: EMERGENCY MEDICINE

## 2017-03-26 PROCEDURE — 80053 COMPREHEN METABOLIC PANEL: CPT | Performed by: EMERGENCY MEDICINE

## 2017-03-26 RX ORDER — ALUMINA, MAGNESIA, AND SIMETHICONE 2400; 2400; 240 MG/30ML; MG/30ML; MG/30ML
30 SUSPENSION ORAL 4 TIMES DAILY PRN
Qty: 1 BOTTLE | Refills: 0 | Status: SHIPPED | OUTPATIENT
Start: 2017-03-26 | End: 2017-05-09

## 2017-03-26 RX ADMIN — LIDOCAINE HYDROCHLORIDE 30 ML: 20 SOLUTION ORAL; TOPICAL at 16:12

## 2017-03-26 ASSESSMENT — ENCOUNTER SYMPTOMS
FEVER: 0
ABDOMINAL PAIN: 1
DIARRHEA: 0
DIFFICULTY URINATING: 0
VOMITING: 0
CONSTIPATION: 0

## 2017-03-26 NOTE — ED AVS SNAPSHOT
[facility], Emergency Department    [address] [facility] [address]    Phone:  [phone]                                       Andrew Lockwood   MRN: [id number]    Department:  [facility], Emergency Department   Date of Visit:  3/26/2017           Patient Information     Date Of Birth          11/27/1967        Your diagnoses for this visit were:     Abdominal pain, epigastric        You were seen by Ruel Cloud MD.        Discharge Instructions       Please make an appointment to follow up with Your Primary Care Provider as soon as possible.    Return to the emergency Department for any problems.      Future Appointments        Provider Department Dept Phone Center    4/25/2017 8:00 AM [facility] Gastroenterology and [phone] [facility]      24 Hour Appointment Hotline       To make an appointment at any [place] clinic, call [phone] ([phone]). If you don't have a family doctor or clinic, we will help you find one. [place] clinics are conveniently located to serve the needs of you and your family.             Review of your medicines      START taking        Dose / Directions Last dose taken    alum & mag hydroxide-simethicone 400-400-40 MG/5ML Susp suspension   Commonly known as:  MYLANTA ES/MAALOX  ES   Dose:  30 mL   Quantity:  1 Bottle        Take 30 mLs by mouth 4 times daily as needed for indigestion   Refills:  0          Our records show that you are taking the medicines listed below. If these are incorrect, please call your family doctor or clinic.        Dose / Directions Last dose taken    AZITHROMYCIN PO   Dose:  600 mg        Take 600 mg by mouth once a week   Refills:  0        blood glucose lancets standard   Commonly known as:  no brand specified   Quantity:  100 Box        Use to test blood sugar four times daily or as directed.   Refills:  5        blood glucose monitoring meter device kit   Commonly known as:  no brand specified    Quantity:  1 kit        Use to test blood sugar four times daily or as directed.   Refills:  0        blood glucose monitoring test strip   Commonly known as:  no brand specified   Quantity:  100 each        Use to test blood sugars four times daily or as directed   Refills:  5        gabapentin 300 MG capsule   Commonly known as:  NEURONTIN   Quantity:  90 capsule        TAKE ONE CAPSULE BY MOUTH ON DAY 1, THEN TAKE ONE CAPSULE TWICE DAILY ON DAY 2 THEN TAKE ONE CAPSULE THREE TIMES A DAY.   Refills:  3        GENVOYA [phone] MG Tabs per tablet   Quantity:  30 tablet   Generic drug:  Xtsvmem-Fnitt-Xospoytl-TenofAF        TAKE ONE TABLET BY MOUTH EVERY DAY   Refills:  11        guaiFENesin-codeine 100-10 MG/5ML Soln solution   Commonly known as:  ROBITUSSIN AC   Dose:  5 mL   Quantity:  236 mL        Take 5 mLs by mouth every 4 hours as needed for cough   Refills:  0        hyoscyamine 0.125 MG tablet   Commonly known as:  ANASPAZ/LEVSIN   Dose:  0.125-0.25 mg   Quantity:  40 tablet        Take 1-2 tablets (125-250 mcg) by mouth every 4 hours as needed for cramping   Refills:  1        metFORMIN 500 MG tablet   Commonly known as:  GLUCOPHAGE   Quantity:  120 tablet        TAKE TWO TABLETS BY MOUTH TWICE A DAY WITH MEALS   Refills:  3        PEPCID AC MAXIMUM STRENGTH PO        Refills:  0        RABEprazole 20 MG EC tablet   Commonly known as:  ACIPHEX   Dose:  20 mg   Quantity:  30 tablet        Take 1 tablet (20 mg) by mouth daily   Refills:  1        ranitidine 150 MG tablet   Commonly known as:  ZANTAC   Dose:  150 mg   Quantity:  60 tablet        Take 1 tablet (150 mg) by mouth 2 times daily as needed for heartburn   Refills:  1        sildenafil 50 MG cap/tab   Commonly known as:  VIAGRA   Dose:  25 mg   Quantity:  30 tablet        Take 0.5 tablets (25 mg) by mouth daily as needed for erectile dysfunction   Refills:  0        simethicone 80 MG chewable tablet   Commonly known as:  GAS RELIEF   Dose:   80 mg   Quantity:  30 tablet        Take 1 tablet (80 mg) by mouth every 6 hours as needed for flatulence or cramping   Refills:  0        sulfamethoxazole-trimethoprim 800-160 MG per tablet   Commonly known as:  BACTRIM DS/SEPTRA DS   Dose:  1 tablet        Take 1 tablet by mouth 2 times daily   Refills:  0                Prescriptions were sent or printed at these locations (1 Prescription)                   Other Prescriptions                Printed at Department/Unit printer (1 of 1)         alum & mag hydroxide-simethicone (MYLANTA ES/MAALOX  ES) 400-400-40 MG/5ML SUSP suspension                Orders Needing Specimen Collection     None      Pending Results     No orders found from 3/24/2017 to 3/27/2017.            Pending Culture Results     No orders found from 3/24/2017 to 3/27/2017.            Thank you for choosing [facility]       Thank you for choosing [facility] for your care. Our goal is always to provide you with excellent care. Hearing back from our patients is one way we can continue to improve our services. Please take a few minutes to complete the written survey that you may receive in the mail after you visit with us. Thank you!        [employer] Information     [employer] gives you secure access to your electronic health record. If you see a primary care provider, you can also send messages to your care team and make appointments. If you have questions, please call your primary care clinic.  If you do not have a primary care provider, please call [phone] and they will assist you.        Care EveryWhere ID     This is your Care EveryWhere ID. This could be used by other organizations to access your [place] medical records  [id number]        After Visit Summary       This is your record. Keep this with you and show to your community pharmacist(s) and doctor(s) at your next visit.

## 2017-03-26 NOTE — ED AVS SNAPSHOT
[facility], [place], Emergency Department    [address] [place] [address]    Phone:  [phone]                                       Andrew Lockwood   MRN: [id number]    Department:  [facility], Emergency Department   Date of Visit:  3/26/2017           After Visit Summary Signature Page     I have received my discharge instructions, and my questions have been answered. I have discussed any challenges I see with this plan with the nurse or doctor.    ..........................................................................................................................................  Patient/Patient Representative Signature      ..........................................................................................................................................  Patient Representative Print Name and Relationship to Patient    ..................................................               ................................................  Date                                            Time    ..........................................................................................................................................  Reviewed by Signature/Title    ...................................................              ..............................................  Date                                                            Time

## 2017-03-26 NOTE — ED PROVIDER NOTES
[facility] EMERGENCY DEPARTMENT ([facility])  March 26, 2017  ED 11   History     Chief Complaint   Patient presents with     Abdominal Pain     The history is provided by the patient and medical records.     Andrew Lockwood is a 49 year old male with a history of AIDS, CNS toxoplasmosis, chronic abdominal pain, and type 2 diabetes mellitus who presents to the Emergency Department with abdominal pain. He states that the pain is related to his GERD and feels crampy. He states that he always has pain similar to this. Patient reports that this pain typically improves with a GI cocktail. He states that he is unable to take Carafate. He states that his Zantac has not been treating his symptoms. No fevers. No urinary symptoms or change in bowel habits.     I have reviewed the Medications, Allergies, Past Medical and Surgical History, and Social History in the [employer] system.  PAST MEDICAL HISTORY  Past Medical History:   Diagnosis Date     AIDS (H)      Allergic rhinitis due to other allergen     DNS     Chronic abdominal pain      CNS toxoplasmosis (H)      Diabetes type 2, controlled (H)      GERD (gastroesophageal reflux disease)      HIV (human immunodeficiency virus infection) (H)      Periungual wart      Sleep apnea     doesn't use CPAP     PAST SURGICAL HISTORY  Past Surgical History:   Procedure Laterality Date     C NONSPECIFIC PROCEDURE      right forearm fracture     COLONOSCOPY Left 1/22/2016    Procedure: COMBINED COLONOSCOPY, SINGLE OR MULTIPLE BIOPSY/POLYPECTOMY BY BIOPSY;  Surgeon: Clark Saini MD;  Location: [facility] GI     HC EXPLORE UNDESC TESTIS,INGUIN/SCROTAL       LAPAROSCOPY DIAGNOSTIC (GENERAL) N/A 7/26/2016    Procedure: LAPAROSCOPY DIAGNOSTIC (GENERAL);  Surgeon: Susannah Arriaga MD;  Location: [facility] OR     OPTICAL TRACKING SYSTEM CRANIOTOMY, EXCISE TUMOR, COMBINED Left 4/10/2015    Procedure: COMBINED OPTICAL TRACKING SYSTEM CRANIOTOMY, EXCISE TUMOR;  Surgeon: Mirlande Colmenares MD;   Location: [facility] OR     REPAIR GAMEKEEPER'S THUMB Right 12/2/2016    Procedure: REPAIR LIGAMENT ULNAR COLLATERAL THUMB (GAMEKEEPER'S);  Surgeon: Evin Zamorano MD;  Location: [facility] OR     FAMILY HISTORY  Family History   Problem Relation Age of Onset     DIABETES Brother      DIABETES Father      Alzheimer Disease Father      Unknown/Adopted Mother      DIABETES Paternal Grandfather      CANCER No family hx of      no skin cancer     Skin Cancer No family hx of      no famiy hx of skin cancer     SOCIAL HISTORY  Social History   Substance Use Topics     Smoking status: Current Every Day Smoker     Packs/day: 0.25     Last attempt to quit: 10/28/2016     Smokeless tobacco: Former User      Comment: just smoked very rarely/socailly in the past     Alcohol use No      Comment: Last etoh in 2007     MEDICATIONS  No current facility-administered medications for this encounter.      Current Outpatient Prescriptions   Medication     alum & mag hydroxide-simethicone (MYLANTA ES/MAALOX  ES) 400-400-40 MG/5ML SUSP suspension     gabapentin (NEURONTIN) 300 MG capsule     RABEprazole (ACIPHEX) 20 MG EC tablet     ranitidine (ZANTAC) 150 MG tablet     hyoscyamine (ANASPAZ/LEVSIN) 0.125 MG tablet     metFORMIN (GLUCOPHAGE) 500 MG tablet     GENVOYA [id number] MG PO TABS     guaiFENesin-codeine (ROBITUSSIN AC) 100-10 MG/5ML SOLN solution     sildenafil (VIAGRA) 50 MG cap/tab     Famotidine (PEPCID AC MAXIMUM STRENGTH PO)     simethicone (GAS RELIEF) 80 MG chewable tablet     AZITHROMYCIN PO     blood glucose (NO BRAND SPECIFIED) lancets standard     blood glucose monitoring (NO BRAND SPECIFIED) meter device kit     blood glucose monitoring (NO BRAND SPECIFIED) test strip     sulfamethoxazole-trimethoprim (BACTRIM DS,SEPTRA DS) 800-160 MG per tablet     ALLERGIES  Allergies   Allergen Reactions     Milk [Lac Bovis] Hives     Tylenol [Acetaminophen] Itching        Review of Systems   Constitutional: Negative for fever.    Gastrointestinal: Positive for abdominal pain. Negative for constipation, diarrhea and vomiting.   Genitourinary: Negative for difficulty urinating.   All other systems reviewed and are negative.      Physical Exam   BP: 122/83  Heart Rate: 116  Temp: 98.1  F (36.7  C)  Resp: 14  SpO2: 97 %  Physical Exam   Constitutional: He is oriented to person, place, and time. He appears well-developed and well-nourished.  Non-toxic appearance. He does not appear ill. No distress.   HENT:   Head: Normocephalic and atraumatic.   Mouth/Throat: Oropharynx is clear and moist. No oropharyngeal exudate.   Eyes: Conjunctivae and EOM are normal. Pupils are equal, round, and reactive to light. No scleral icterus.   Neck: Normal range of motion. Neck supple. No JVD present. No tracheal deviation present. No thyromegaly present.   Cardiovascular: Regular rhythm, normal heart sounds and intact distal pulses.  Tachycardia present.  Exam reveals no gallop and no friction rub.    No murmur heard.  Pulmonary/Chest: Effort normal and breath sounds normal. No respiratory distress.   Abdominal: Soft. Bowel sounds are normal. He exhibits no distension and no mass. There is no tenderness. There is no rebound and no guarding.   Musculoskeletal: Normal range of motion. He exhibits no edema or tenderness.   Lymphadenopathy:     He has no cervical adenopathy.   Neurological: He is alert and oriented to person, place, and time. He has normal strength. No cranial nerve deficit or sensory deficit.   Skin: Skin is warm and dry. No rash noted. No erythema. No pallor.   Psychiatric: He has a normal mood and affect. His behavior is normal.   Nursing note and vitals reviewed.      ED Course     ED Course     Procedures            Assessments & Plan (with Medical Decision Making)   This patient presented to the Emergency Department complaining of chronic abdominal pain.  He is afebrile.  He is mildly tachycardic but past presentations were reviewed and the  patient has been tachycardic at that time also.  He appeared in no distress and had a benign abdominal exam, decreasing suspicion for more serious intra-abdominal processes such as hepatobiliary disease, pancreatitis, obstruction, perforation or other pathology.  He has had relief with a GI cocktail in the past and was given a GI.     Upon reassessment, patient reported that he still had some pain, but was feeling somewhat better. He requested stronger pain medication. He was informed that we would not be prescribing narcotic pain medication for this problem is does appear that this is chronic abdominal pain. He became upset and began yelling. Was informed that we were not tolerate verbal abuse. I did offer to send blood work at this time to evaluate for any acute process. This was sent and returned with normal values. When I went to inform the patient of this, it was noted that he had eloped from the emergency Department.      This part of the document was transcribed by Yadira Boss, Medical Scribe.      I have reviewed the nursing notes.    I have reviewed the findings, diagnosis, plan and need for follow up with the patient.    New Prescriptions    ALUM & MAG HYDROXIDE-SIMETHICONE (MYLANTA ES/MAALOX  ES) 400-400-40 MG/5ML SUSP SUSPENSION    Take 30 mLs by mouth 4 times daily as needed for indigestion       Final diagnoses:   Abdominal pain, epigastric     I, Samuel Chung, am serving as a trained medical scribe to document services personally performed by Ruel Cloud MD, based on the provider's statements to me.      IRuel MD, was physically present and have reviewed and verified the accuracy of this note documented by Samuel Chung.    3/26/2017   [facility], [place], EMERGENCY DEPARTMENT     Ruel Cloud MD  04/03/17 0451

## 2017-03-26 NOTE — ED NOTES
Pt comes in with c/o abdominal pain, worsening acid reflux, and headache over the past couple of days. Hx AIDS. Alert and oriented, vss.

## 2017-03-27 DIAGNOSIS — B58.2 TOXOPLASMA ENCEPHALITIS (H): ICD-10-CM

## 2017-03-27 DIAGNOSIS — B20 HUMAN IMMUNODEFICIENCY VIRUS (HIV) DISEASE (H): ICD-10-CM

## 2017-03-27 RX ORDER — SULFAMETHOXAZOLE/TRIMETHOPRIM 800-160 MG
TABLET ORAL
Qty: 60 TABLET | Refills: 3 | Status: SHIPPED | OUTPATIENT
Start: 2017-03-27 | End: 2017-08-01

## 2017-03-27 NOTE — ED NOTES
Patient not in room when Dr. Cloud went into room to tell him that he would be discharged. Left prior to receiving discharge paperwork or getting discharge VS.

## 2017-03-28 ENCOUNTER — TELEPHONE (OUTPATIENT)
Dept: GASTROENTEROLOGY | Facility: CLINIC | Age: 52
End: 2017-03-28

## 2017-03-28 NOTE — TELEPHONE ENCOUNTER
lvm for patient regarding new appointment time and date due to Dr Alan being out.Waiting for call back to confirm

## 2017-03-30 ENCOUNTER — APPOINTMENT (OUTPATIENT)
Dept: INFECTIOUS DISEASES | Facility: CLINIC | Age: 52
End: 2017-03-30
Attending: INTERNAL MEDICINE
Payer: COMMERCIAL

## 2017-03-31 DIAGNOSIS — R85.615: ICD-10-CM

## 2017-03-31 DIAGNOSIS — B20 HUMAN IMMUNODEFICIENCY VIRUS (HIV) DISEASE (H): ICD-10-CM

## 2017-03-31 DIAGNOSIS — K64.9 HEMORRHOIDS, UNSPECIFIED HEMORRHOID TYPE: Primary | ICD-10-CM

## 2017-04-03 ENCOUNTER — PRE VISIT (OUTPATIENT)
Dept: SURGERY | Facility: CLINIC | Age: 52
End: 2017-04-03

## 2017-04-03 NOTE — TELEPHONE ENCOUNTER
1.  Date/reason for appt: 4/6/17 - Hemorrhoids & Rectal Pap  2.  Referring provider: Dr. Sharon Rosado  3.  Call to patient (Yes / No - short description): no, pt is referred  4.  Previous care at / records requested from:   - [facility] -- Records and referral in [place]   - Colonoscopy report 1/8/16 is in [place]

## 2017-04-04 DIAGNOSIS — B58.2 TOXOPLASMA ENCEPHALITIS (H): ICD-10-CM

## 2017-04-04 DIAGNOSIS — B20 HUMAN IMMUNODEFICIENCY VIRUS (HIV) DISEASE (H): Primary | ICD-10-CM

## 2017-04-06 ENCOUNTER — HOSPITAL ENCOUNTER (EMERGENCY)
Facility: CLINIC | Age: 52
Discharge: HOME | End: 2017-04-06
Attending: EMERGENCY MEDICINE | Admitting: EMERGENCY MEDICINE
Payer: COMMERCIAL

## 2017-04-06 VITALS
SYSTOLIC BLOOD PRESSURE: 108 MMHG | OXYGEN SATURATION: 98 % | RESPIRATION RATE: 16 BRPM | DIASTOLIC BLOOD PRESSURE: 77 MMHG | BODY MASS INDEX: 28.32 KG/M2 | WEIGHT: 170 LBS | TEMPERATURE: 98.4 F | HEIGHT: 65 IN

## 2017-04-06 DIAGNOSIS — R11.0 NAUSEA: ICD-10-CM

## 2017-04-06 DIAGNOSIS — R10.12 ABDOMINAL PAIN, LEFT UPPER QUADRANT: ICD-10-CM

## 2017-04-06 LAB
ALBUMIN SERPL-MCNC: 3.6 G/DL (ref 3.4–5)
ALP SERPL-CCNC: 89 U/L (ref 40–150)
ALT SERPL W P-5'-P-CCNC: 19 U/L (ref 0–70)
ANION GAP SERPL CALCULATED.3IONS-SCNC: 10 MMOL/L (ref 3–14)
AST SERPL W P-5'-P-CCNC: 11 U/L (ref 0–45)
BASOPHILS # BLD AUTO: 0 10E9/L (ref 0–0.2)
BASOPHILS NFR BLD AUTO: 0.3 %
BILIRUB SERPL-MCNC: 0.5 MG/DL (ref 0.2–1.3)
BUN SERPL-MCNC: 16 MG/DL (ref 7–30)
CALCIUM SERPL-MCNC: 8.8 MG/DL (ref 8.5–10.1)
CHLORIDE SERPL-SCNC: 108 MMOL/L (ref 94–109)
CO2 SERPL-SCNC: 24 MMOL/L (ref 20–32)
CREAT SERPL-MCNC: 0.85 MG/DL (ref 0.66–1.25)
DIFFERENTIAL METHOD BLD: ABNORMAL
EOSINOPHIL # BLD AUTO: 0.2 10E9/L (ref 0–0.7)
EOSINOPHIL NFR BLD AUTO: 3 %
ERYTHROCYTE [DISTWIDTH] IN BLOOD BY AUTOMATED COUNT: 14 % (ref 10–15)
GFR SERPL CREATININE-BSD FRML MDRD: ABNORMAL ML/MIN/1.7M2
GLUCOSE SERPL-MCNC: 107 MG/DL (ref 70–99)
HCT VFR BLD AUTO: 41.8 % (ref 40–53)
HGB BLD-MCNC: 14.2 G/DL (ref 13.3–17.7)
IMM GRANULOCYTES # BLD: 0 10E9/L (ref 0–0.4)
IMM GRANULOCYTES NFR BLD: 0.4 %
LACTATE BLD-SCNC: 0.8 MMOL/L (ref 0.7–2.1)
LIPASE SERPL-CCNC: 193 U/L (ref 73–393)
LYMPHOCYTES # BLD AUTO: 2.2 10E9/L (ref 0.8–5.3)
LYMPHOCYTES NFR BLD AUTO: 31.4 %
MCH RBC QN AUTO: 32.9 PG (ref 26.5–33)
MCHC RBC AUTO-ENTMCNC: 34 G/DL (ref 31.5–36.5)
MCV RBC AUTO: 97 FL (ref 78–100)
MONOCYTES # BLD AUTO: 0.6 10E9/L (ref 0–1.3)
MONOCYTES NFR BLD AUTO: 8.6 %
NEUTROPHILS # BLD AUTO: 3.9 10E9/L (ref 1.6–8.3)
NEUTROPHILS NFR BLD AUTO: 56.3 %
NRBC # BLD AUTO: 0 10*3/UL
NRBC BLD AUTO-RTO: 0 /100
PLATELET # BLD AUTO: 225 10E9/L (ref 150–450)
POTASSIUM SERPL-SCNC: 3.5 MMOL/L (ref 3.4–5.3)
PROT SERPL-MCNC: 7.4 G/DL (ref 6.8–8.8)
RBC # BLD AUTO: 4.32 10E12/L (ref 4.4–5.9)
SODIUM SERPL-SCNC: 143 MMOL/L (ref 133–144)
WBC # BLD AUTO: 7 10E9/L (ref 4–11)

## 2017-04-06 PROCEDURE — 25000128 H RX IP 250 OP 636: Performed by: EMERGENCY MEDICINE

## 2017-04-06 PROCEDURE — 25000132 ZZH RX MED GY IP 250 OP 250 PS 637: Performed by: EMERGENCY MEDICINE

## 2017-04-06 PROCEDURE — 83605 ASSAY OF LACTIC ACID: CPT | Performed by: EMERGENCY MEDICINE

## 2017-04-06 PROCEDURE — 99284 EMERGENCY DEPT VISIT MOD MDM: CPT | Mod: Z6 | Performed by: EMERGENCY MEDICINE

## 2017-04-06 PROCEDURE — 96375 TX/PRO/DX INJ NEW DRUG ADDON: CPT

## 2017-04-06 PROCEDURE — 80053 COMPREHEN METABOLIC PANEL: CPT | Performed by: EMERGENCY MEDICINE

## 2017-04-06 PROCEDURE — 25000125 ZZHC RX 250: Performed by: EMERGENCY MEDICINE

## 2017-04-06 PROCEDURE — 99284 EMERGENCY DEPT VISIT MOD MDM: CPT | Mod: 25

## 2017-04-06 PROCEDURE — 83690 ASSAY OF LIPASE: CPT | Performed by: EMERGENCY MEDICINE

## 2017-04-06 PROCEDURE — 96374 THER/PROPH/DIAG INJ IV PUSH: CPT

## 2017-04-06 PROCEDURE — 96361 HYDRATE IV INFUSION ADD-ON: CPT | Mod: 59

## 2017-04-06 PROCEDURE — 85025 COMPLETE CBC W/AUTO DIFF WBC: CPT | Performed by: EMERGENCY MEDICINE

## 2017-04-06 RX ORDER — KETOROLAC TROMETHAMINE 30 MG/ML
30 INJECTION, SOLUTION INTRAMUSCULAR; INTRAVENOUS ONCE
Status: COMPLETED | OUTPATIENT
Start: 2017-04-06 | End: 2017-04-06

## 2017-04-06 RX ORDER — SODIUM CHLORIDE 9 MG/ML
1000 INJECTION, SOLUTION INTRAVENOUS CONTINUOUS
Status: DISCONTINUED | OUTPATIENT
Start: 2017-04-06 | End: 2017-04-06 | Stop reason: HOSPADM

## 2017-04-06 RX ADMIN — SODIUM CHLORIDE 1000 ML: 9 INJECTION, SOLUTION INTRAVENOUS at 16:12

## 2017-04-06 RX ADMIN — KETOROLAC TROMETHAMINE 30 MG: 30 INJECTION, SOLUTION INTRAMUSCULAR at 15:08

## 2017-04-06 RX ADMIN — PROCHLORPERAZINE EDISYLATE 10 MG: 5 INJECTION INTRAMUSCULAR; INTRAVENOUS at 15:08

## 2017-04-06 RX ADMIN — SODIUM CHLORIDE 1000 ML: 9 INJECTION, SOLUTION INTRAVENOUS at 15:08

## 2017-04-06 RX ADMIN — LIDOCAINE HYDROCHLORIDE 30 ML: 20 SOLUTION ORAL; TOPICAL at 15:09

## 2017-04-06 ASSESSMENT — ENCOUNTER SYMPTOMS
FEVER: 0
CONFUSION: 0
COLOR CHANGE: 0
DIFFICULTY URINATING: 0
DIARRHEA: 0
DIAPHORESIS: 0
CONSTIPATION: 0
VOMITING: 0
CHILLS: 0
SHORTNESS OF BREATH: 0
HEADACHES: 0
NECK STIFFNESS: 0
ARTHRALGIAS: 0
EYE REDNESS: 0
ABDOMINAL PAIN: 1
ABDOMINAL DISTENTION: 1
NAUSEA: 1

## 2017-04-06 NOTE — ED AVS SNAPSHOT
[facility], [place], Emergency Department    [address] [place] [address]    Phone:  [phone]                                       Andrew Lockwood   MRN: [id number]    Department:  [facility], Emergency Department   Date of Visit:  4/6/2017           After Visit Summary Signature Page     I have received my discharge instructions, and my questions have been answered. I have discussed any challenges I see with this plan with the nurse or doctor.    ..........................................................................................................................................  Patient/Patient Representative Signature      ..........................................................................................................................................  Patient Representative Print Name and Relationship to Patient    ..................................................               ................................................  Date                                            Time    ..........................................................................................................................................  Reviewed by Signature/Title    ...................................................              ..............................................  Date                                                            Time

## 2017-04-06 NOTE — ED AVS SNAPSHOT
[facility], Emergency Department    [address] [address]    Phone:  [phone]                                       Andrew Lockwood   MRN: [id number]    Department:  [facility], Emergency Department   Date of Visit:  4/6/2017           Patient Information     Date Of Birth          11/27/1967        Your diagnoses for this visit were:     None       You were seen by Sohail Ramírez MD.      Follow-up Information     Follow up with Sharon Rosado MD.    Specialty:  Infectious Diseases    Contact information:    [facility]  [address]  [place] [address]  [phone]          Discharge Instructions         * Abdominal Pain,Uncertain Cause [Male]  Based on your visit today, the exact cause of your abdominal (stomach) pain is not clear. Your exam and tests do not indicate a dangerous cause at this time. However, the signs of a serious problem may take more time to appear. Although your exam was reassuring today, sometimes early in the course of many conditions, exam and lab tests can appear normal. Therefore, it is important for you to watch for any new symptoms or worsening of your condition.  Causes:  It may not be obvious what caused your symptoms. Pay attention to things that do seem to make your symptoms worse or better and discuss this with your doctor when you follow up.  Diagnosis:  The evaluation of abdominal pain in the emergency department may only require an exam by the doctor or it may include blood, urine or imaging studies, depending on many factors. Sometimes exams and tests can identify a cause but in many cases, a clear cause is not found. Further testing at follow up visits may help to suggest a clear diagnosis.  Home Care:    Rest as much as possible until your next exam.    Try to avoid any medications (unless otherwise directed by your doctor), foods, activities, or other factors that you may have contributed to your symptoms.    Try to  eat foods that you know that you have tolerated well in the past. Certain diets may be recommended for some conditions that cause abdominal pain. However, since the cause of your symptoms may not be clear, discuss your diet more with your primary care provider or specialist for further recommendations.     Eating several small meals per day as opposed to 2 or 3 larger meals may help.    Monitor closely for anything that may make your symptoms worse or better. Pay close attention to symptoms below that may indicate worsening of your condition.  Follow Up And Precautions:  See your doctor or this facility as instructed (or sooner, if your symptoms are not improving). In some cases, you may need more testing.  Contact Your Doctor Or Seek Medical Attention  if any of the following occur:    Pain is becoming worse    You are unable to take your medications because of too much vomiting    Swelling of the abdomen    Fever of 101 F (38.3 C) or higher, or as directed by your health care provider    Blood in vomit or bowel movements (dark red or black color)    Jaundice (yellow color of eyes and skin)    New onset of weakness, dizziness or fainting    New onset of chest, arm, back, neck or jaw pain    0062-0784 [place] [address]. All rights reserved. This information is not intended as a substitute for professional medical care. Always follow your healthcare professional's instructions.      Continue current medical treatment    Future Appointments        Provider Department Dept Phone Center    4/20/2017 4:30 PM Leeanne Bravo [facility] [phone] [facility]    5/23/2017 10:00 AM Jonas Alan [facility] Gastroenterology and [phone] [facility]      24 Hour Appointment Hotline       To make an appointment at any [facility], call [phone] ([phone]). If you don't have a family doctor or clinic, we will help you find one.  [facility] are conveniently located to serve the needs of you and your family.             Review of your medicines      Our records show that you are taking the medicines listed below. If these are incorrect, please call your family doctor or clinic.        Dose / Directions Last dose taken    alum & mag hydroxide-simethicone 400-400-40 MG/5ML Susp suspension   Commonly known as:  MYLANTA ES/MAALOX  ES   Dose:  30 mL   Quantity:  1 Bottle        Take 30 mLs by mouth 4 times daily as needed for indigestion   Refills:  0        AZITHROMYCIN PO   Dose:  600 mg        Take 600 mg by mouth once a week   Refills:  0        blood glucose lancets standard   Commonly known as:  no brand specified   Quantity:  100 Box        Use to test blood sugar four times daily or as directed.   Refills:  5        blood glucose monitoring meter device kit   Commonly known as:  no brand specified   Quantity:  1 kit        Use to test blood sugar four times daily or as directed.   Refills:  0        blood glucose monitoring test strip   Commonly known as:  no brand specified   Quantity:  100 each        Use to test blood sugars four times daily or as directed   Refills:  5        gabapentin 300 MG capsule   Commonly known as:  NEURONTIN   Quantity:  90 capsule        TAKE ONE CAPSULE BY MOUTH ON DAY 1, THEN TAKE ONE CAPSULE TWICE DAILY ON DAY 2 THEN TAKE ONE CAPSULE THREE TIMES A DAY.   Refills:  3        GENVOYA [id number] MG Tabs per tablet   Quantity:  30 tablet   Generic drug:  Hdehxdo-Lxdpe-Wjjjcmtz-TenofAF        TAKE ONE TABLET BY MOUTH EVERY DAY   Refills:  11        guaiFENesin-codeine 100-10 MG/5ML Soln solution   Commonly known as:  ROBITUSSIN AC   Dose:  5 mL   Quantity:  236 mL        Take 5 mLs by mouth every 4 hours as needed for cough   Refills:  0        hyoscyamine 0.125 MG tablet   Commonly known as:  ANASPAZ/LEVSIN   Dose:  0.125-0.25 mg   Quantity:  40 tablet        Take 1-2 tablets (125-250 mcg) by mouth  every 4 hours as needed for cramping   Refills:  1        metFORMIN 500 MG tablet   Commonly known as:  GLUCOPHAGE   Quantity:  120 tablet        TAKE TWO TABLETS BY MOUTH TWICE A DAY WITH MEALS   Refills:  3        PEPCID AC MAXIMUM STRENGTH PO        Refills:  0        RABEprazole 20 MG EC tablet   Commonly known as:  ACIPHEX   Dose:  20 mg   Quantity:  30 tablet        Take 1 tablet (20 mg) by mouth daily   Refills:  1        ranitidine 150 MG tablet   Commonly known as:  ZANTAC   Dose:  150 mg   Quantity:  60 tablet        Take 1 tablet (150 mg) by mouth 2 times daily as needed for heartburn   Refills:  1        sildenafil 50 MG cap/tab   Commonly known as:  VIAGRA   Dose:  25 mg   Quantity:  30 tablet        Take 0.5 tablets (25 mg) by mouth daily as needed for erectile dysfunction   Refills:  0        simethicone 80 MG chewable tablet   Commonly known as:  GAS RELIEF   Dose:  80 mg   Quantity:  30 tablet        Take 1 tablet (80 mg) by mouth every 6 hours as needed for flatulence or cramping   Refills:  0        * sulfamethoxazole-trimethoprim 800-160 MG per tablet   Commonly known as:  BACTRIM DS/SEPTRA DS   Dose:  1 tablet        Take 1 tablet by mouth 2 times daily   Refills:  0        * sulfamethoxazole-trimethoprim 800-160 MG per tablet   Commonly known as:  BACTRIM DS/SEPTRA DS   Quantity:  60 tablet        TAKE 1 TABLET BY MOUTH 2 TIMES DAILY   Refills:  3        * Notice:  This list has 2 medication(s) that are the same as other medications prescribed for you. Read the directions carefully, and ask your doctor or other care provider to review them with you.            Procedures and tests performed during your visit     CBC with platelets differential    Comprehensive metabolic panel    Lactic acid whole blood    Lipase      Orders Needing Specimen Collection     None      Pending Results     No orders found from 4/4/2017 to 4/7/2017.            Pending Culture Results     No orders found from 4/4/2017  to 4/7/2017.            Thank you for choosing [facility]       Thank you for choosing [facility] for your care. Our goal is always to provide you with excellent care. Hearing back from our patients is one way we can continue to improve our services. Please take a few minutes to complete the written survey that you may receive in the mail after you visit with us. Thank you!        [facility] Information     [facility] gives you secure access to your electronic health record. If you see a primary care provider, you can also send messages to your care team and make appointments. If you have questions, please call your primary care clinic.  If you do not have a primary care provider, please call [phone] and they will assist you.        Care EveryWhere ID     This is your Care EveryWhere ID. This could be used by other organizations to access your [facility] medical records  [id number]        After Visit Summary       This is your record. Keep this with you and show to your community pharmacist(s) and doctor(s) at your next visit.

## 2017-04-06 NOTE — ED NOTES
Pt present with c/o acute on chronic abd pain with bloating x since midnight last night and nausea without vomiting starting this morning. Pt was directed here by the nurse at the HIV clinic. Pt reports that he feels his HIV meds contribute to his chronic abd pain issues. Pt has not taken anything for pain and is not prescribed anything.   Hx of Toxoplasmosis in 2015, HIV with AIDS dx    Vital Signs - BP: 146/90 Patient Position: Sitting Heart Rate: 83 Pulse/Heart Rate Source: Monitor; Left SpO2: 98 % O2 Device: None (Room air) Temp: 98.4  F (36.9  C) Temp src: Oral General Capillary Refill: less than/equal to 3 secs Patient Currently in Pain: Yes HR/Pulse Review: 83

## 2017-04-06 NOTE — DISCHARGE INSTRUCTIONS
* Abdominal Pain,Uncertain Cause [Male]  Based on your visit today, the exact cause of your abdominal (stomach) pain is not clear. Your exam and tests do not indicate a dangerous cause at this time. However, the signs of a serious problem may take more time to appear. Although your exam was reassuring today, sometimes early in the course of many conditions, exam and lab tests can appear normal. Therefore, it is important for you to watch for any new symptoms or worsening of your condition.  Causes:  It may not be obvious what caused your symptoms. Pay attention to things that do seem to make your symptoms worse or better and discuss this with your doctor when you follow up.  Diagnosis:  The evaluation of abdominal pain in the emergency department may only require an exam by the doctor or it may include blood, urine or imaging studies, depending on many factors. Sometimes exams and tests can identify a cause but in many cases, a clear cause is not found. Further testing at follow up visits may help to suggest a clear diagnosis.  Home Care:    Rest as much as possible until your next exam.    Try to avoid any medications (unless otherwise directed by your doctor), foods, activities, or other factors that you may have contributed to your symptoms.    Try to eat foods that you know that you have tolerated well in the past. Certain diets may be recommended for some conditions that cause abdominal pain. However, since the cause of your symptoms may not be clear, discuss your diet more with your primary care provider or specialist for further recommendations.     Eating several small meals per day as opposed to 2 or 3 larger meals may help.    Monitor closely for anything that may make your symptoms worse or better. Pay close attention to symptoms below that may indicate worsening of your condition.  Follow Up And Precautions:  See your doctor or this facility as instructed (or sooner, if your symptoms are not  improving). In some cases, you may need more testing.  Contact Your Doctor Or Seek Medical Attention  if any of the following occur:    Pain is becoming worse    You are unable to take your medications because of too much vomiting    Swelling of the abdomen    Fever of 101 F (38.3 C) or higher, or as directed by your health care provider    Blood in vomit or bowel movements (dark red or black color)    Jaundice (yellow color of eyes and skin)    New onset of weakness, dizziness or fainting    New onset of chest, arm, back, neck or jaw pain    1022-2895 [facility], [address] [place] [address]. All rights reserved. This information is not intended as a substitute for professional medical care. Always follow your healthcare professional's instructions.      Continue current medical treatment

## 2017-04-06 NOTE — ED PROVIDER NOTES
History     Chief Complaint   Patient presents with     Abdominal Pain     acute on chronic abd pain with bloating x since midnight     Nausea     nausea without vomiting today only     HPI  Andrew Lockwood is a 49 year old male with a history of HIV/AIDS, CNS toxoplasmosis, type II diabetes and chronic abdominal pain who presents to the emergency department today with complaints of acute on chronic abdominal pain and distention. Patient reports developing mid-abdominal pain and distention last night around midnight. He describes the pain as cramping and states it is localized to his mid-abdomen. Nothing makes it better or worse. He also reports nausea but denies vomiting or diarrhea. He states his last bowel movement was last night around 12 AM. He denies taking anything for the pain. He otherwise denies fevers, chills, sweats or other complaints. Patient reports having had similar symptoms multiple times in the past and states his current pain is similar to his chronic abdominal pain.    I have reviewed the Medications, Allergies, Past Medical and Surgical History, and Social History in the [employer] system.    Past Medical History:   Diagnosis Date     AIDS (H)      Allergic rhinitis due to other allergen     DNS     Chronic abdominal pain      CNS toxoplasmosis (H)      Diabetes type 2, controlled (H)      GERD (gastroesophageal reflux disease)      HIV (human immunodeficiency virus infection) (H)      Periungual wart      Sleep apnea     doesn't use CPAP       Past Surgical History:   Procedure Laterality Date     C NONSPECIFIC PROCEDURE      right forearm fracture     COLONOSCOPY Left 1/22/2016    Procedure: COMBINED COLONOSCOPY, SINGLE OR MULTIPLE BIOPSY/POLYPECTOMY BY BIOPSY;  Surgeon: Clark Saini MD;  Location: [facility] GI     HC EXPLORE UNDESC TESTIS,INGUIN/SCROTAL       LAPAROSCOPY DIAGNOSTIC (GENERAL) N/A 7/26/2016    Procedure: LAPAROSCOPY DIAGNOSTIC (GENERAL);  Surgeon: Susannah Arriaga MD;   Location: [facility] OR     OPTICAL TRACKING SYSTEM CRANIOTOMY, EXCISE TUMOR, COMBINED Left 4/10/2015    Procedure: COMBINED OPTICAL TRACKING SYSTEM CRANIOTOMY, EXCISE TUMOR;  Surgeon: Mirlande Colmenares MD;  Location: [facility] OR     REPAIR GAMEKEEPER'S THUMB Right 12/2/2016    Procedure: REPAIR LIGAMENT ULNAR COLLATERAL THUMB (GAMEKEEPER'S);  Surgeon: Evin Zamorano MD;  Location: [facility] OR       Family History   Problem Relation Age of Onset     DIABETES Brother      DIABETES Father      Alzheimer Disease Father      Unknown/Adopted Mother      DIABETES Paternal Grandfather      CANCER No family hx of      no skin cancer     Skin Cancer No family hx of      no famiy hx of skin cancer       Social History   Substance Use Topics     Smoking status: Current Every Day Smoker     Packs/day: 0.25     Last attempt to quit: 10/28/2016     Smokeless tobacco: Former User      Comment: just smoked very rarely/socailly in the past     Alcohol use No      Comment: Last etoh in 2007     Current Facility-Administered Medications   Medication     0.9% sodium chloride infusion     Current Outpatient Prescriptions   Medication     sulfamethoxazole-trimethoprim (BACTRIM DS/SEPTRA DS) 800-160 MG per tablet     alum & mag hydroxide-simethicone (MYLANTA ES/MAALOX  ES) 400-400-40 MG/5ML SUSP suspension     guaiFENesin-codeine (ROBITUSSIN AC) 100-10 MG/5ML SOLN solution     sildenafil (VIAGRA) 50 MG cap/tab     gabapentin (NEURONTIN) 300 MG capsule     Famotidine (PEPCID AC MAXIMUM STRENGTH PO)     RABEprazole (ACIPHEX) 20 MG EC tablet     ranitidine (ZANTAC) 150 MG tablet     hyoscyamine (ANASPAZ/LEVSIN) 0.125 MG tablet     metFORMIN (GLUCOPHAGE) 500 MG tablet     GENVOYA 126-455-987-10 MG PO TABS     simethicone (GAS RELIEF) 80 MG chewable tablet     AZITHROMYCIN PO     blood glucose (NO BRAND SPECIFIED) lancets standard     blood glucose monitoring (NO BRAND SPECIFIED) meter device kit     blood glucose monitoring (NO BRAND SPECIFIED) test  "strip     sulfamethoxazole-trimethoprim (BACTRIM DS,SEPTRA DS) 800-160 MG per tablet        Allergies   Allergen Reactions     Milk [Lac Bovis] Hives     Tylenol [Acetaminophen] Itching       Review of Systems   Constitutional: Negative for chills, diaphoresis and fever.   HENT: Negative for congestion.    Eyes: Negative for redness.   Respiratory: Negative for shortness of breath.    Cardiovascular: Negative for chest pain.   Gastrointestinal: Positive for abdominal distention, abdominal pain and nausea. Negative for constipation, diarrhea and vomiting.   Genitourinary: Negative for difficulty urinating.   Musculoskeletal: Negative for arthralgias and neck stiffness.   Skin: Negative for color change.   Neurological: Negative for headaches.   Psychiatric/Behavioral: Negative for confusion.   All other systems reviewed and are negative.      Physical Exam   BP: 146/90  Heart Rate: 83  Temp: 98.4  F (36.9  C)  Height: 165.1 cm (5' 5\")  Weight: 77.1 kg (170 lb)  SpO2: 98 %  Physical Exam   Constitutional: No distress.   HENT:   Head: Atraumatic.   Mouth/Throat: Oropharynx is clear and moist. No oropharyngeal exudate.   Eyes: Pupils are equal, round, and reactive to light. No scleral icterus.   Cardiovascular: Normal heart sounds and intact distal pulses.    Pulmonary/Chest: Breath sounds normal. No respiratory distress.   Abdominal: Soft. Bowel sounds are normal. There is no tenderness.   Musculoskeletal: He exhibits no edema or tenderness.   Skin: Skin is warm. No rash noted. He is not diaphoretic.   Nursing note and vitals reviewed.      ED Course     ED Course     Procedures   2:40 PM  The patient was seen and examined by Dr. Ramírez in Room ED12.         Results for orders placed or performed during the hospital encounter of 04/06/17   CBC with platelets differential   Result Value Ref Range    WBC 7.0 4.0 - 11.0 10e9/L    RBC Count 4.32 (L) 4.4 - 5.9 10e12/L    Hemoglobin 14.2 13.3 - 17.7 g/dL    Hematocrit 41.8 " 40.0 - 53.0 %    MCV 97 78 - 100 fl    MCH 32.9 26.5 - 33.0 pg    MCHC 34.0 31.5 - 36.5 g/dL    RDW 14.0 10.0 - 15.0 %    Platelet Count 225 150 - 450 10e9/L    Diff Method Automated Method     % Neutrophils 56.3 %    % Lymphocytes 31.4 %    % Monocytes 8.6 %    % Eosinophils 3.0 %    % Basophils 0.3 %    % Immature Granulocytes 0.4 %    Nucleated RBCs 0 0 /100    Absolute Neutrophil 3.9 1.6 - 8.3 10e9/L    Absolute Lymphocytes 2.2 0.8 - 5.3 10e9/L    Absolute Monocytes 0.6 0.0 - 1.3 10e9/L    Absolute Eosinophils 0.2 0.0 - 0.7 10e9/L    Absolute Basophils 0.0 0.0 - 0.2 10e9/L    Abs Immature Granulocytes 0.0 0 - 0.4 10e9/L    Absolute Nucleated RBC 0.0    Comprehensive metabolic panel   Result Value Ref Range    Sodium 143 133 - 144 mmol/L    Potassium 3.5 3.4 - 5.3 mmol/L    Chloride 108 94 - 109 mmol/L    Carbon Dioxide 24 20 - 32 mmol/L    Anion Gap 10 3 - 14 mmol/L    Glucose 107 (H) 70 - 99 mg/dL    Urea Nitrogen 16 7 - 30 mg/dL    Creatinine 0.85 0.66 - 1.25 mg/dL    GFR Estimate >90  Non  GFR Calc   >60 mL/min/1.7m2    GFR Estimate If Black >90   GFR Calc   >60 mL/min/1.7m2    Calcium 8.8 8.5 - 10.1 mg/dL    Bilirubin Total 0.5 0.2 - 1.3 mg/dL    Albumin 3.6 3.4 - 5.0 g/dL    Protein Total 7.4 6.8 - 8.8 g/dL    Alkaline Phosphatase 89 40 - 150 U/L    ALT 19 0 - 70 U/L    AST 11 0 - 45 U/L   Lactic acid whole blood   Result Value Ref Range    Lactic Acid 0.8 0.7 - 2.1 mmol/L   Lipase   Result Value Ref Range    Lipase 193 73 - 393 U/L     Medications   0.9% sodium chloride BOLUS (0 mLs Intravenous Stopped 4/6/17 1603)     Followed by   0.9% sodium chloride infusion (0 mLs Intravenous Stopped 4/6/17 1817)   ketorolac (TORADOL) injection 30 mg (30 mg Intravenous Given 4/6/17 8568)   lidocaine (XYLOCAINE) 2 % 15 mL, alum & mag hydroxide-simethicone (MYLANTA ES/MAALOX  ES) 15 mL GI Cocktail (30 mLs Oral Given 4/6/17 1197)   prochlorperazine (COMPAZINE) injection 10 mg (10 mg  Intravenous Given 4/6/17 1508)              Labs Ordered and Resulted from Time of ED Arrival Up to the Time of Departure from the ED   CBC WITH PLATELETS DIFFERENTIAL - Abnormal; Notable for the following:        Result Value    RBC Count 4.32 (*)     All other components within normal limits   COMPREHENSIVE METABOLIC PANEL - Abnormal; Notable for the following:     Glucose 107 (*)     All other components within normal limits   LACTIC ACID WHOLE BLOOD   LIPASE       Assessments & Plan (with Medical Decision Making)   49-year-old male with a history HIV and frequent episodes for recurrent left upper quadrant and epigastric abdominal pain presents with complaints of nausea and recurrence of abdominal pain and cramping.  Exam was unremarkable.  Laboratories including CBC, comprehensive metabolic panel, lactic acid and lipase were all normal with exception of minimally elevated glucose 107.  Patient was given GI cocktail, Toradol and Compazine and promptly fell asleep.  He was also given IV fluids and observed several hours.  His symptoms had largely improved.  I recommended follow-up with his primary physician and to continue current medical treatment.    I have reviewed the nursing notes.    I have reviewed the findings, diagnosis, plan and need for follow up with the patient.    New Prescriptions    No medications on file       Final diagnoses:   Abdominal pain, left upper quadrant   Nausea   I, Dali Mathew, am serving as a trained medical scribe to document services personally performed by Bhupinder Ramírez MD, based on the provider's statements to me.      IBhupinder MD, was physically present and have reviewed and verified the accuracy of this note documented by Dali Mathew.       4/6/2017   [facility], EMERGENCY DEPARTMENT     Sohail Ramírez MD  04/06/17 7956

## 2017-04-08 ENCOUNTER — HOSPITAL ENCOUNTER (EMERGENCY)
Facility: CLINIC | Age: 52
Discharge: HOME | End: 2017-04-08
Attending: EMERGENCY MEDICINE | Admitting: EMERGENCY MEDICINE
Payer: COMMERCIAL

## 2017-04-08 ENCOUNTER — APPOINTMENT (OUTPATIENT)
Dept: CT IMAGING | Facility: CLINIC | Age: 52
End: 2017-04-08
Attending: EMERGENCY MEDICINE
Payer: COMMERCIAL

## 2017-04-08 VITALS
RESPIRATION RATE: 12 BRPM | HEIGHT: 65 IN | SYSTOLIC BLOOD PRESSURE: 107 MMHG | WEIGHT: 179.01 LBS | DIASTOLIC BLOOD PRESSURE: 80 MMHG | BODY MASS INDEX: 29.83 KG/M2 | HEART RATE: 112 BPM | TEMPERATURE: 98.4 F | OXYGEN SATURATION: 98 %

## 2017-04-08 DIAGNOSIS — R10.13 ABDOMINAL PAIN, EPIGASTRIC: ICD-10-CM

## 2017-04-08 DIAGNOSIS — R51.9 NONINTRACTABLE HEADACHE, UNSPECIFIED CHRONICITY PATTERN, UNSPECIFIED HEADACHE TYPE: ICD-10-CM

## 2017-04-08 LAB — GLUCOSE BLDC GLUCOMTR-MCNC: 133 MG/DL (ref 70–99)

## 2017-04-08 PROCEDURE — 00000146 ZZHCL STATISTIC GLUCOSE BY METER IP

## 2017-04-08 PROCEDURE — 70450 CT HEAD/BRAIN W/O DYE: CPT

## 2017-04-08 PROCEDURE — 25000132 ZZH RX MED GY IP 250 OP 250 PS 637: Performed by: EMERGENCY MEDICINE

## 2017-04-08 PROCEDURE — 25000125 ZZHC RX 250: Performed by: EMERGENCY MEDICINE

## 2017-04-08 PROCEDURE — 99284 EMERGENCY DEPT VISIT MOD MDM: CPT | Mod: 25 | Performed by: EMERGENCY MEDICINE

## 2017-04-08 PROCEDURE — 99284 EMERGENCY DEPT VISIT MOD MDM: CPT | Mod: Z6 | Performed by: EMERGENCY MEDICINE

## 2017-04-08 RX ORDER — OXYCODONE HYDROCHLORIDE 5 MG/1
5 TABLET ORAL ONCE
Status: COMPLETED | OUTPATIENT
Start: 2017-04-08 | End: 2017-04-08

## 2017-04-08 RX ORDER — IBUPROFEN 600 MG/1
600 TABLET, FILM COATED ORAL ONCE
Status: COMPLETED | OUTPATIENT
Start: 2017-04-08 | End: 2017-04-08

## 2017-04-08 RX ORDER — OLANZAPINE 5 MG/1
10 TABLET, ORALLY DISINTEGRATING ORAL ONCE
Status: COMPLETED | OUTPATIENT
Start: 2017-04-08 | End: 2017-04-08

## 2017-04-08 RX ADMIN — LIDOCAINE HYDROCHLORIDE 30 ML: 20 SOLUTION ORAL; TOPICAL at 01:39

## 2017-04-08 RX ADMIN — OLANZAPINE 10 MG: 5 TABLET, ORALLY DISINTEGRATING ORAL at 02:48

## 2017-04-08 RX ADMIN — IBUPROFEN 600 MG: 600 TABLET ORAL at 01:39

## 2017-04-08 RX ADMIN — OXYCODONE HYDROCHLORIDE 5 MG: 5 TABLET ORAL at 03:27

## 2017-04-08 NOTE — DISCHARGE INSTRUCTIONS
* HEADACHE [unspecified]    The cause of your headache today is not clear, but it does not appear to be the sign of any serious illness.  Under stress, some people tense the muscles of their shoulder, neck and scalp without knowing it. If this condition lasts long enough, a TENSION HEADACHE can occur.  A MIGRAINE HEADACHE is caused by changes in blood flow to the brain. It can be mild or severe. A migraine attack may be triggered by emotional stress, hormone changes during the menstrual cycle, oral contraceptives, alcohol use, certain foods containing tyramine, eye strain, weather changes, missing meals, lack of sleep or oversleeping.  Other causes of headache include a viral illness, sinus, ear or throat infection, dental pain and TMJ (jaw joint) pain.  HOME CARE:    If you were given pain medicine for this headache, do not drive yourself home. Arrange for a ride, instead. When you get home, try to sleep. You should feel much better when you wake up.    If you are having nausea or vomiting, follow a light diet until your headache is relieved.    If you have a migraine type headache, use sunglasses when in the daylight or around bright indoor lighting until symptoms improve. Bright glaring light can worsen this kind of headache.  FOLLOW UP with your doctor if the headache is not better within the next 24 hours. If you have frequent headaches you should discuss a treatment plan with your primary care doctor. By being aware of the earliest signs of headache, and starting treatment right away, you may be able to stop the pain yourself.  GET PROMPT MEDICAL ATTENTION if any of the following occur:    Worsening of your head pain or no improvement within 24 hours    Repeated vomiting (unable to keep liquids down)    Fever over 101 F (38.3 C)    Stiff neck    Extreme drowsiness, confusion or fainting    Weakness of an arm or leg or one side of the face    Difficulty with speech or vision    [id number] [address], [address]  [place] [address]. All rights reserved. This information is not intended as a substitute for professional medical care. Always follow your healthcare professional's instructions.

## 2017-04-08 NOTE — ED PROVIDER NOTES
History     Chief Complaint   Patient presents with     Headache     Abdominal Pain     HPI  Andrew Lockwood is a 49 year old male with a history of HIV/AIDS, CNS toxoplasmosis, type II diabetes and chronic abdominal pain who presents for evaluation of headache and abdominal pain. The comes in with complaint of crampy epigastric pain and headache. He notes that he was here 2 days ago with a similar pain and he states that he does suffer from this pain periodically, and that he has been told previously that this could be related to his HIV/AIDS medications. The patient denies any fevers, but he endorses some diarrhea. No vomiting. Regarding his headache, he states he does not have a history of similar occurring previously. He does endorse blurred vision that began 2-3 days ago, and he has some photophobia. He has been compliant with his CNS toxoplasmosis medication. The patient has concern for abnormal blood sugar and asks this to be checked. He takes metformin.     I have reviewed the Medications, Allergies, Past Medical and Surgical History, and Social History in the Epic system.    No current facility-administered medications for this encounter.      Current Outpatient Prescriptions   Medication     gabapentin (NEURONTIN) 300 MG capsule     metFORMIN (GLUCOPHAGE) 500 MG tablet     GENVOYA [id number] MG PO TABS     sulfamethoxazole-trimethoprim (BACTRIM DS/SEPTRA DS) 800-160 MG per tablet     alum & mag hydroxide-simethicone (MYLANTA ES/MAALOX  ES) 400-400-40 MG/5ML SUSP suspension     guaiFENesin-codeine (ROBITUSSIN AC) 100-10 MG/5ML SOLN solution     sildenafil (VIAGRA) 50 MG cap/tab     Famotidine (PEPCID AC MAXIMUM STRENGTH PO)     RABEprazole (ACIPHEX) 20 MG EC tablet     ranitidine (ZANTAC) 150 MG tablet     hyoscyamine (ANASPAZ/LEVSIN) 0.125 MG tablet     simethicone (GAS RELIEF) 80 MG chewable tablet     AZITHROMYCIN PO     blood glucose (NO BRAND SPECIFIED) lancets standard     blood glucose monitoring  (NO BRAND SPECIFIED) meter device kit     blood glucose monitoring (NO BRAND SPECIFIED) test strip     sulfamethoxazole-trimethoprim (BACTRIM DS,SEPTRA DS) 800-160 MG per tablet     Past Medical History:   Diagnosis Date     AIDS (H)      Allergic rhinitis due to other allergen     DNS     Chronic abdominal pain      CNS toxoplasmosis (H)      Diabetes type 2, controlled (H)      GERD (gastroesophageal reflux disease)      HIV (human immunodeficiency virus infection) (H)      Periungual wart      Sleep apnea     doesn't use CPAP       Past Surgical History:   Procedure Laterality Date     C NONSPECIFIC PROCEDURE      right forearm fracture     COLONOSCOPY Left 1/22/2016    Procedure: COMBINED COLONOSCOPY, SINGLE OR MULTIPLE BIOPSY/POLYPECTOMY BY BIOPSY;  Surgeon: Clark Saini MD;  Location: [facility]     HC EXPLORE UNDESC TESTIS,INGUIN/SCROTAL       LAPAROSCOPY DIAGNOSTIC (GENERAL) N/A 7/26/2016    Procedure: LAPAROSCOPY DIAGNOSTIC (GENERAL);  Surgeon: Susannah Arriaga MD;  Location: [facility] OR     OPTICAL TRACKING SYSTEM CRANIOTOMY, EXCISE TUMOR, COMBINED Left 4/10/2015    Procedure: COMBINED OPTICAL TRACKING SYSTEM CRANIOTOMY, EXCISE TUMOR;  Surgeon: Mirlande Colmenares MD;  Location: [facility] OR     REPAIR GAMEKEEPER'S THUMB Right 12/2/2016    Procedure: REPAIR LIGAMENT ULNAR COLLATERAL THUMB (GAMEKEEPER'S);  Surgeon: Evin Zamorano MD;  Location: [facility] OR       Family History   Problem Relation Age of Onset     DIABETES Brother      DIABETES Father      Alzheimer Disease Father      Unknown/Adopted Mother      DIABETES Paternal Grandfather      CANCER No family hx of      no skin cancer     Skin Cancer No family hx of      no famiy hx of skin cancer       Social History   Substance Use Topics     Smoking status: Current Every Day Smoker     Packs/day: 0.25     Last attempt to quit: 10/28/2016     Smokeless tobacco: Former User      Comment: just smoked very rarely/socailly in the past     Alcohol use No       "Comment: Last etoh in 2007        Allergies   Allergen Reactions     Milk [Lac Bovis] Hives     Tylenol [Acetaminophen] Itching       Review of Systems  10 pt ROS completed and negative except as noted above in HPI    Physical Exam   BP: 115/82  Pulse: 112  Temp: 98.4  F (36.9  C)  Resp: 16  Height: 165.1 cm (5' 5\")  Weight: 81.2 kg (179 lb 0.2 oz)  SpO2: 100 %  Physical Exam   Constitutional: He is oriented to person, place, and time. No distress.   HENT:   Head: Normocephalic and atraumatic.   Right Ear: External ear normal.   Left Ear: External ear normal.   Mouth/Throat: Oropharynx is clear and moist. No oropharyngeal exudate.   Eyes: Conjunctivae are normal. Pupils are equal, round, and reactive to light.   Neck: Normal range of motion. Neck supple.   Cardiovascular: Normal rate and intact distal pulses.    Pulmonary/Chest: Effort normal. No respiratory distress. He has no wheezes. He has no rales. He exhibits no tenderness.   Abdominal: Soft. There is no tenderness. There is no rebound and no guarding.   Musculoskeletal: Normal range of motion.   Neurological: He is alert and oriented to person, place, and time. He displays normal reflexes. No cranial nerve deficit. He exhibits normal muscle tone. Coordination normal.   Skin: Skin is warm and dry. He is not diaphoretic.   Psychiatric: He has a normal mood and affect. His behavior is normal. Thought content normal.   Nursing note and vitals reviewed.      ED Course     ED Course     Procedures             Critical Care time:  none               Labs Ordered and Resulted from Time of ED Arrival Up to the Time of Departure from the ED - No data to display    Assessments & Plan (with Medical Decision Making)   1.  Headache   2.  Abdominal pain    48 yo M with HIV who presents with headache and abdominal pain.  He states his headaches are atypical.  He has a normal exam with no evidence of meningismus and no neurologic deficits.  He was treated with ibuprofen, " Zyprexa and oxycodone and had resolution of his headache.  His abdominal pain was resolved with a GI cocktail.  He was discharged with PCP follow up.     I have reviewed the nursing notes.    I have reviewed the findings, diagnosis, plan and need for follow up with the patient.    New Prescriptions    No medications on file       Final diagnoses:   None     IMiky, am serving as a trained medical scribe to document services personally performed by Angelo Estes MD, based on the provider's statements to me.      Angelo KELLY MD, was physically present and have reviewed and verified the accuracy of this note documented by Miky Lima.    4/8/2017   [facility], [place], EMERGENCY DEPARTMENT     Angelo Estes MD  05/26/17 8727

## 2017-04-08 NOTE — ED NOTES
Pt presents with c/o HA x2 hours and epigastric abdominal pain x 5 hrs. Did not take any medication for pain.

## 2017-04-08 NOTE — ED AVS SNAPSHOT
[facility], [place], Emergency Department    [address] [place] [address]    Phone:  [phone]                                       Andrew Lockwood   MRN: [id number]    Department:  [facility], Emergency Department   Date of Visit:  4/8/2017           After Visit Summary Signature Page     I have received my discharge instructions, and my questions have been answered. I have discussed any challenges I see with this plan with the nurse or doctor.    ..........................................................................................................................................  Patient/Patient Representative Signature      ..........................................................................................................................................  Patient Representative Print Name and Relationship to Patient    ..................................................               ................................................  Date                                            Time    ..........................................................................................................................................  Reviewed by Signature/Title    ...................................................              ..............................................  Date                                                            Time

## 2017-04-08 NOTE — ED AVS SNAPSHOT
[facility], Emergency Department    [address] [facility] [address]    Phone:  [phone]                                       Andrew Lockwood   MRN: [id number]    Department:  [facility], Emergency Department   Date of Visit:  4/8/2017           Patient Information     Date Of Birth          11/27/1967        Your diagnoses for this visit were:     Nonintractable headache, unspecified chronicity pattern, unspecified headache type        You were seen by Angelo Estes MD.        Discharge Instructions          * HEADACHE [unspecified]    The cause of your headache today is not clear, but it does not appear to be the sign of any serious illness.  Under stress, some people tense the muscles of their shoulder, neck and scalp without knowing it. If this condition lasts long enough, a TENSION HEADACHE can occur.  A MIGRAINE HEADACHE is caused by changes in blood flow to the brain. It can be mild or severe. A migraine attack may be triggered by emotional stress, hormone changes during the menstrual cycle, oral contraceptives, alcohol use, certain foods containing tyramine, eye strain, weather changes, missing meals, lack of sleep or oversleeping.  Other causes of headache include a viral illness, sinus, ear or throat infection, dental pain and TMJ (jaw joint) pain.  HOME CARE:    If you were given pain medicine for this headache, do not drive yourself home. Arrange for a ride, instead. When you get home, try to sleep. You should feel much better when you wake up.    If you are having nausea or vomiting, follow a light diet until your headache is relieved.    If you have a migraine type headache, use sunglasses when in the daylight or around bright indoor lighting until symptoms improve. Bright glaring light can worsen this kind of headache.  FOLLOW UP with your doctor if the headache is not better within the next 24 hours. If you have frequent headaches you should discuss a treatment plan with  your primary care doctor. By being aware of the earliest signs of headache, and starting treatment right away, you may be able to stop the pain yourself.  GET PROMPT MEDICAL ATTENTION if any of the following occur:    Worsening of your head pain or no improvement within 24 hours    Repeated vomiting (unable to keep liquids down)    Fever over 101 F (38.3 C)    Stiff neck    Extreme drowsiness, confusion or fainting    Weakness of an arm or leg or one side of the face    Difficulty with speech or vision    [id number] [facility], [address] [place] [address]. All rights reserved. This information is not intended as a substitute for professional medical care. Always follow your healthcare professional's instructions.      Future Appointments        Provider Department Dept Phone Center    4/20/2017 4:30 PM Leeanne Bravo [facility] [phone] [facility]    5/23/2017 10:00 AM [facility] [phone] [facility]      24 Hour Appointment Hotline       To make an appointment at any [facility], call [phone] ([phone]). If you don't have a family doctor or clinic, we will help you find one. [place] clinics are conveniently located to serve the needs of you and your family.             Review of your medicines      Our records show that you are taking the medicines listed below. If these are incorrect, please call your family doctor or clinic.        Dose / Directions Last dose taken    alum & mag hydroxide-simethicone 400-400-40 MG/5ML Susp suspension   Commonly known as:  MYLANTA ES/MAALOX  ES   Dose:  30 mL   Quantity:  1 Bottle        Take 30 mLs by mouth 4 times daily as needed for indigestion   Refills:  0        AZITHROMYCIN PO   Dose:  600 mg        Take 600 mg by mouth once a week   Refills:  0        blood glucose lancets standard   Commonly known as:  no brand specified   Quantity:  100 Box        Use to test  blood sugar four times daily or as directed.   Refills:  5        blood glucose monitoring meter device kit   Commonly known as:  no brand specified   Quantity:  1 kit        Use to test blood sugar four times daily or as directed.   Refills:  0        blood glucose monitoring test strip   Commonly known as:  no brand specified   Quantity:  100 each        Use to test blood sugars four times daily or as directed   Refills:  5        gabapentin 300 MG capsule   Commonly known as:  NEURONTIN   Quantity:  90 capsule        TAKE ONE CAPSULE BY MOUTH ON DAY 1, THEN TAKE ONE CAPSULE TWICE DAILY ON DAY 2 THEN TAKE ONE CAPSULE THREE TIMES A DAY.   Refills:  3        GENVOYA [id number] MG Tabs per tablet   Quantity:  30 tablet   Generic drug:  [id number]        TAKE ONE TABLET BY MOUTH EVERY DAY   Refills:  11        guaiFENesin-codeine 100-10 MG/5ML Soln solution   Commonly known as:  ROBITUSSIN AC   Dose:  5 mL   Quantity:  236 mL        Take 5 mLs by mouth every 4 hours as needed for cough   Refills:  0        hyoscyamine 0.125 MG tablet   Commonly known as:  ANASPAZ/LEVSIN   Dose:  0.125-0.25 mg   Quantity:  40 tablet        Take 1-2 tablets (125-250 mcg) by mouth every 4 hours as needed for cramping   Refills:  1        metFORMIN 500 MG tablet   Commonly known as:  GLUCOPHAGE   Quantity:  120 tablet        TAKE TWO TABLETS BY MOUTH TWICE A DAY WITH MEALS   Refills:  3        PEPCID AC MAXIMUM STRENGTH PO        Refills:  0        RABEprazole 20 MG EC tablet   Commonly known as:  ACIPHEX   Dose:  20 mg   Quantity:  30 tablet        Take 1 tablet (20 mg) by mouth daily   Refills:  1        ranitidine 150 MG tablet   Commonly known as:  ZANTAC   Dose:  150 mg   Quantity:  60 tablet        Take 1 tablet (150 mg) by mouth 2 times daily as needed for heartburn   Refills:  1        sildenafil 50 MG cap/tab   Commonly known as:  VIAGRA   Dose:  25 mg   Quantity:  30 tablet        Take 0.5 tablets (25 mg)  by mouth daily as needed for erectile dysfunction   Refills:  0        simethicone 80 MG chewable tablet   Commonly known as:  GAS RELIEF   Dose:  80 mg   Quantity:  30 tablet        Take 1 tablet (80 mg) by mouth every 6 hours as needed for flatulence or cramping   Refills:  0        * sulfamethoxazole-trimethoprim 800-160 MG per tablet   Commonly known as:  BACTRIM DS/SEPTRA DS   Dose:  1 tablet        Take 1 tablet by mouth 2 times daily   Refills:  0        * sulfamethoxazole-trimethoprim 800-160 MG per tablet   Commonly known as:  BACTRIM DS/SEPTRA DS   Quantity:  60 tablet        TAKE 1 TABLET BY MOUTH 2 TIMES DAILY   Refills:  3        * Notice:  This list has 2 medication(s) that are the same as other medications prescribed for you. Read the directions carefully, and ask your doctor or other care provider to review them with you.            Procedures and tests performed during your visit     Procedure/Test Number of Times Performed    Glucose by meter 1    Glucose monitor nursing POCT 2    Head CT w/o contrast 1      Orders Needing Specimen Collection     None      Pending Results     Date and Time Order Name Status Description    4/8/2017 0314 Head CT w/o contrast Preliminary             Pending Culture Results     No orders found from 4/6/2017 to 4/9/2017.            Thank you for choosing [facility]       Thank you for choosing [facility] for your care. Our goal is always to provide you with excellent care. Hearing back from our patients is one way we can continue to improve our services. Please take a few minutes to complete the written survey that you may receive in the mail after you visit with us. Thank you!        [employer] Information     [employer] gives you secure access to your electronic health record. If you see a primary care provider, you can also send messages to your care team and make appointments. If you have questions, please call your primary care clinic.  If you do not have a primary care  provider, please call [phone] and they will assist you.        Care EveryWhere ID     This is your Care EveryWhere ID. This could be used by other organizations to access your [place] medical records  [id number]        After Visit Summary       This is your record. Keep this with you and show to your community pharmacist(s) and doctor(s) at your next visit.

## 2017-04-19 ENCOUNTER — TELEPHONE (OUTPATIENT)
Dept: INFECTIOUS DISEASES | Facility: CLINIC | Age: 52
End: 2017-04-19

## 2017-04-27 ENCOUNTER — TELEPHONE (OUTPATIENT)
Dept: GASTROENTEROLOGY | Facility: CLINIC | Age: 52
End: 2017-04-27

## 2017-05-01 ENCOUNTER — TELEPHONE (OUTPATIENT)
Dept: GASTROENTEROLOGY | Facility: CLINIC | Age: 52
End: 2017-05-01

## 2017-05-03 ENCOUNTER — HOSPITAL ENCOUNTER (EMERGENCY)
Facility: CLINIC | Age: 52
Discharge: HOME | End: 2017-05-04
Attending: EMERGENCY MEDICINE | Admitting: EMERGENCY MEDICINE
Payer: COMMERCIAL

## 2017-05-03 DIAGNOSIS — R10.9 ABDOMINAL PAIN OF UNKNOWN ETIOLOGY: ICD-10-CM

## 2017-05-03 DIAGNOSIS — R10.12 ABDOMINAL PAIN, LEFT UPPER QUADRANT: ICD-10-CM

## 2017-05-03 LAB
ALBUMIN SERPL-MCNC: 3.5 G/DL (ref 3.4–5)
ALP SERPL-CCNC: 96 U/L (ref 40–150)
ALT SERPL W P-5'-P-CCNC: 20 U/L (ref 0–70)
ANION GAP SERPL CALCULATED.3IONS-SCNC: 10 MMOL/L (ref 3–14)
AST SERPL W P-5'-P-CCNC: 16 U/L (ref 0–45)
BASOPHILS # BLD AUTO: 0 10E9/L (ref 0–0.2)
BASOPHILS NFR BLD AUTO: 0.1 %
BILIRUB SERPL-MCNC: 0.4 MG/DL (ref 0.2–1.3)
BUN SERPL-MCNC: 9 MG/DL (ref 7–30)
CALCIUM SERPL-MCNC: 9.2 MG/DL (ref 8.5–10.1)
CHLORIDE SERPL-SCNC: 104 MMOL/L (ref 94–109)
CO2 SERPL-SCNC: 25 MMOL/L (ref 20–32)
CREAT SERPL-MCNC: 0.73 MG/DL (ref 0.66–1.25)
DIFFERENTIAL METHOD BLD: ABNORMAL
EOSINOPHIL # BLD AUTO: 0.3 10E9/L (ref 0–0.7)
EOSINOPHIL NFR BLD AUTO: 3.7 %
ERYTHROCYTE [DISTWIDTH] IN BLOOD BY AUTOMATED COUNT: 13 % (ref 10–15)
GFR SERPL CREATININE-BSD FRML MDRD: ABNORMAL ML/MIN/1.7M2
GLUCOSE SERPL-MCNC: 150 MG/DL (ref 70–99)
HCT VFR BLD AUTO: 40.1 % (ref 40–53)
HGB BLD-MCNC: 13.8 G/DL (ref 13.3–17.7)
IMM GRANULOCYTES # BLD: 0 10E9/L (ref 0–0.4)
IMM GRANULOCYTES NFR BLD: 0.4 %
LIPASE SERPL-CCNC: 220 U/L (ref 73–393)
LYMPHOCYTES # BLD AUTO: 2.6 10E9/L (ref 0.8–5.3)
LYMPHOCYTES NFR BLD AUTO: 35.9 %
MCH RBC QN AUTO: 32.3 PG (ref 26.5–33)
MCHC RBC AUTO-ENTMCNC: 34.4 G/DL (ref 31.5–36.5)
MCV RBC AUTO: 94 FL (ref 78–100)
MONOCYTES # BLD AUTO: 0.5 10E9/L (ref 0–1.3)
MONOCYTES NFR BLD AUTO: 7.3 %
NEUTROPHILS # BLD AUTO: 3.8 10E9/L (ref 1.6–8.3)
NEUTROPHILS NFR BLD AUTO: 52.6 %
NRBC # BLD AUTO: 0 10*3/UL
NRBC BLD AUTO-RTO: 0 /100
PLATELET # BLD AUTO: 234 10E9/L (ref 150–450)
POTASSIUM SERPL-SCNC: 3.3 MMOL/L (ref 3.4–5.3)
PROT SERPL-MCNC: 7.2 G/DL (ref 6.8–8.8)
RBC # BLD AUTO: 4.27 10E12/L (ref 4.4–5.9)
SODIUM SERPL-SCNC: 139 MMOL/L (ref 133–144)
WBC # BLD AUTO: 7.3 10E9/L (ref 4–11)

## 2017-05-03 PROCEDURE — 80053 COMPREHEN METABOLIC PANEL: CPT | Performed by: EMERGENCY MEDICINE

## 2017-05-03 PROCEDURE — 99284 EMERGENCY DEPT VISIT MOD MDM: CPT | Mod: 25 | Performed by: EMERGENCY MEDICINE

## 2017-05-03 PROCEDURE — 25000132 ZZH RX MED GY IP 250 OP 250 PS 637: Performed by: EMERGENCY MEDICINE

## 2017-05-03 PROCEDURE — 96374 THER/PROPH/DIAG INJ IV PUSH: CPT | Performed by: EMERGENCY MEDICINE

## 2017-05-03 PROCEDURE — 25000125 ZZHC RX 250: Performed by: EMERGENCY MEDICINE

## 2017-05-03 PROCEDURE — 25000128 H RX IP 250 OP 636: Performed by: EMERGENCY MEDICINE

## 2017-05-03 PROCEDURE — 93005 ELECTROCARDIOGRAM TRACING: CPT | Performed by: EMERGENCY MEDICINE

## 2017-05-03 PROCEDURE — 85025 COMPLETE CBC W/AUTO DIFF WBC: CPT | Performed by: EMERGENCY MEDICINE

## 2017-05-03 PROCEDURE — 99284 EMERGENCY DEPT VISIT MOD MDM: CPT | Mod: Z6 | Performed by: EMERGENCY MEDICINE

## 2017-05-03 PROCEDURE — 83690 ASSAY OF LIPASE: CPT | Performed by: EMERGENCY MEDICINE

## 2017-05-03 RX ORDER — LIDOCAINE 40 MG/G
CREAM TOPICAL
Status: DISCONTINUED | OUTPATIENT
Start: 2017-05-03 | End: 2017-05-04 | Stop reason: HOSPADM

## 2017-05-03 RX ORDER — KETOROLAC TROMETHAMINE 30 MG/ML
30 INJECTION, SOLUTION INTRAMUSCULAR; INTRAVENOUS ONCE
Status: COMPLETED | OUTPATIENT
Start: 2017-05-03 | End: 2017-05-03

## 2017-05-03 RX ORDER — OXYCODONE HYDROCHLORIDE 5 MG/1
10 TABLET ORAL ONCE
Status: COMPLETED | OUTPATIENT
Start: 2017-05-03 | End: 2017-05-03

## 2017-05-03 RX ADMIN — LIDOCAINE HYDROCHLORIDE 30 ML: 20 SOLUTION ORAL; TOPICAL at 21:15

## 2017-05-03 RX ADMIN — OXYCODONE HYDROCHLORIDE 10 MG: 5 TABLET ORAL at 23:08

## 2017-05-03 RX ADMIN — KETOROLAC TROMETHAMINE 30 MG: 30 INJECTION, SOLUTION INTRAMUSCULAR at 21:15

## 2017-05-03 NOTE — ED AVS SNAPSHOT
[facility], Emergency Department    [address] [address]    Phone:  [phone]                                       Andrew Lockwood   MRN: [id number]    Department:  [facility], Emergency Department   Date of Visit:  5/3/2017           Patient Information     Date Of Birth          11/27/1967        Your diagnoses for this visit were:     Abdominal pain of unknown etiology        You were seen by Benita Cash MD.      Follow-up Information     Follow up with Sharon Rosado MD In 2 days.    Specialty:  Infectious Diseases    Contact information:    [facility]  [address]  [facility] [address]  [phone]        Future Appointments        Provider Department Dept Phone Center    5/23/2017 10:00 AM [facility] [phone]      24 Hour Appointment Hotline       To make an appointment at any [facility] clinic, call [phone] ([phone]). If you don't have a family doctor or clinic, we will help you find one. [facility] clinics are conveniently located to serve the needs of you and your family.             Review of your medicines      Our records show that you are taking the medicines listed below. If these are incorrect, please call your family doctor or clinic.        Dose / Directions Last dose taken    alum & mag hydroxide-simethicone 400-400-40 MG/5ML Susp suspension   Commonly known as:  MYLANTA ES/MAALOX  ES   Dose:  30 mL   Quantity:  1 Bottle        Take 30 mLs by mouth 4 times daily as needed for indigestion   Refills:  0        AZITHROMYCIN PO   Dose:  600 mg        Take 600 mg by mouth once a week   Refills:  0        blood glucose lancets standard   Commonly known as:  no brand specified   Quantity:  100 Box        Use to test blood sugar four times daily or as directed.   Refills:  5        blood glucose monitoring meter device kit   Commonly known as:  no brand specified   Quantity:  1  kit        Use to test blood sugar four times daily or as directed.   Refills:  0        blood glucose monitoring test strip   Commonly known as:  no brand specified   Quantity:  100 each        Use to test blood sugars four times daily or as directed   Refills:  5        gabapentin 300 MG capsule   Commonly known as:  NEURONTIN   Quantity:  90 capsule        TAKE ONE CAPSULE BY MOUTH ON DAY 1, THEN TAKE ONE CAPSULE TWICE DAILY ON DAY 2 THEN TAKE ONE CAPSULE THREE TIMES A DAY.   Refills:  3        GENVOYA [phone] MG Tabs per tablet   Quantity:  30 tablet   Generic drug:  Wvlspdn-Sanjy-Mkwbbyay-TenofAF        TAKE ONE TABLET BY MOUTH EVERY DAY   Refills:  11        guaiFENesin-codeine 100-10 MG/5ML Soln solution   Commonly known as:  ROBITUSSIN AC   Dose:  5 mL   Quantity:  236 mL        Take 5 mLs by mouth every 4 hours as needed for cough   Refills:  0        hyoscyamine 0.125 MG tablet   Commonly known as:  ANASPAZ/LEVSIN   Dose:  0.125-0.25 mg   Quantity:  40 tablet        Take 1-2 tablets (125-250 mcg) by mouth every 4 hours as needed for cramping   Refills:  1        metFORMIN 500 MG tablet   Commonly known as:  GLUCOPHAGE   Quantity:  120 tablet        TAKE TWO TABLETS BY MOUTH TWICE A DAY WITH MEALS   Refills:  3        PEPCID AC MAXIMUM STRENGTH PO        Refills:  0        RABEprazole 20 MG EC tablet   Commonly known as:  ACIPHEX   Dose:  20 mg   Quantity:  30 tablet        Take 1 tablet (20 mg) by mouth daily   Refills:  1        sildenafil 50 MG cap/tab   Commonly known as:  VIAGRA   Dose:  25 mg   Quantity:  30 tablet        Take 0.5 tablets (25 mg) by mouth daily as needed for erectile dysfunction   Refills:  0        simethicone 80 MG chewable tablet   Commonly known as:  GAS RELIEF   Dose:  80 mg   Quantity:  30 tablet        Take 1 tablet (80 mg) by mouth every 6 hours as needed for flatulence or cramping   Refills:  0        * sulfamethoxazole-trimethoprim 800-160 MG per tablet   Commonly  known as:  BACTRIM DS/SEPTRA DS   Dose:  1 tablet        Take 1 tablet by mouth 2 times daily   Refills:  0        * sulfamethoxazole-trimethoprim 800-160 MG per tablet   Commonly known as:  BACTRIM DS/SEPTRA DS   Quantity:  60 tablet        TAKE 1 TABLET BY MOUTH 2 TIMES DAILY   Refills:  3        * Notice:  This list has 2 medication(s) that are the same as other medications prescribed for you. Read the directions carefully, and ask your doctor or other care provider to review them with you.            Procedures and tests performed during your visit     CBC with platelets differential    Comprehensive metabolic panel    EKG 12 lead    Lipase    Peripheral IV: Standard      Orders Needing Specimen Collection     None      Pending Results     Date and Time Order Name Status Description    5/3/2017 2156 EKG 12 lead Preliminary             Pending Culture Results     No orders found for last 3 day(s).            Pending Results Instructions     If you had any lab results that were not finalized at the time of your Discharge, you can call the ED Lab Result RN at [phone]. You will be contacted by this team for any positive Lab results or changes in treatment. The nurses are available 7 days a week from 10A to 6:30P.  You can leave a message 24 hours per day and they will return your call.        Thank you for choosing [facility]       Thank you for choosing [facility] for your care. Our goal is always to provide you with excellent care. Hearing back from our patients is one way we can continue to improve our services. Please take a few minutes to complete the written survey that you may receive in the mail after you visit with us. Thank you!        [facility] Information     [facility] gives you secure access to your electronic health record. If you see a primary care provider, you can also send messages to your care team and make appointments. If you have questions, please call your primary care clinic.  If you do not have  a primary care provider, please call [phone] and they will assist you.        Care EveryWhere ID     This is your Care EveryWhere ID. This could be used by other organizations to access your [place] medical records  [id number]        After Visit Summary       This is your record. Keep this with you and show to your community pharmacist(s) and doctor(s) at your next visit.

## 2017-05-03 NOTE — ED AVS SNAPSHOT
[facility] [place], Emergency Department    [address] [place] [address]    Phone:  [phone]                                       Andrew Lockwood   MRN: [id number]    Department:  [facility], Emergency Department   Date of Visit:  5/3/2017           After Visit Summary Signature Page     I have received my discharge instructions, and my questions have been answered. I have discussed any challenges I see with this plan with the nurse or doctor.    ..........................................................................................................................................  Patient/Patient Representative Signature      ..........................................................................................................................................  Patient Representative Print Name and Relationship to Patient    ..................................................               ................................................  Date                                            Time    ..........................................................................................................................................  Reviewed by Signature/Title    ...................................................              ..............................................  Date                                                            Time

## 2017-05-04 VITALS
OXYGEN SATURATION: 99 % | WEIGHT: 179.3 LBS | HEIGHT: 65 IN | TEMPERATURE: 98 F | DIASTOLIC BLOOD PRESSURE: 86 MMHG | BODY MASS INDEX: 29.87 KG/M2 | SYSTOLIC BLOOD PRESSURE: 139 MMHG | HEART RATE: 64 BPM | RESPIRATION RATE: 14 BRPM

## 2017-05-04 DIAGNOSIS — R10.13 EPIGASTRIC PAIN: Primary | ICD-10-CM

## 2017-05-04 LAB — INTERPRETATION ECG - MUSE: NORMAL

## 2017-05-04 PROCEDURE — 25000132 ZZH RX MED GY IP 250 OP 250 PS 637: Performed by: EMERGENCY MEDICINE

## 2017-05-04 RX ORDER — CALCIUM CARBONATE 500 MG/1
500 TABLET, CHEWABLE ORAL DAILY PRN
Status: DISCONTINUED | OUTPATIENT
Start: 2017-05-04 | End: 2017-05-04 | Stop reason: HOSPADM

## 2017-05-04 RX ADMIN — CALCIUM CARBONATE (ANTACID) CHEW TAB 500 MG 500 MG: 500 CHEW TAB at 00:15

## 2017-05-04 NOTE — ED PROVIDER NOTES
"  History     Chief Complaint   Patient presents with     Abdominal Pain     Nausea     HPI  Andrew Lockwood is a 49 year old male with a history of HIV/AIDS, CNS toxoplasmosis, DM2 and chronic abdominal pain who presents to the ED with abdominal pain and nausea. Patient has been seen here in the ED on 3 previous occassions for similar symptoms (03/26/17, 04/06/17, and 04/08/17). Patient states his intermittent cramps began yesterday and rates his pain a \"12/10\". He states his current pain is typical of his cramps. Patient also has complaints of cough, nausea, and vomited once while here in the ED. He states he has taken all of his regular medications today. He denies fevers or testicular pain. Patient also stated he is unable to make it to his GI appointments sometimes due to financial reasons.     I have reviewed the Medications, Allergies, Past Medical and Surgical History, and Social History in the Epic system.    Review of Systems    ROS: 14 point ROS neg other than the symptoms noted above in the HPI.      Physical Exam   BP: 139/86  Pulse: 67  Temp: 98  F (36.7  C)  Resp: 20  Height: 165.1 cm (5' 5\")  Weight: 81.3 kg (179 lb 4.8 oz)  SpO2: 98 %  Physical Exam   Constitutional: He is oriented to person, place, and time. He appears well-developed and well-nourished.   HENT:   Head: Atraumatic.   Right Ear: External ear normal.   Left Ear: External ear normal.   Eyes: Conjunctivae and EOM are normal. No scleral icterus.   Neck: Normal range of motion. Neck supple.   Cardiovascular: Normal rate and regular rhythm.    Pulmonary/Chest: Effort normal. No respiratory distress.   Abdominal: Soft. There is tenderness. There is no rebound and no guarding.   Mild LUQ tenderness to palpation without guardnig   Musculoskeletal: Normal range of motion. He exhibits no edema or tenderness.   Neurological: He is alert and oriented to person, place, and time.   Skin: Skin is warm and dry. No rash noted.   Psychiatric: He has a " "normal mood and affect. His behavior is normal.   Nursing note and vitals reviewed.      ED Course     ED Course     Procedures   9:01 PM  The patient was seen and examined by Dr. Cash in [facility].   Labs Ordered and Resulted from Time of ED Arrival Up to the Time of Departure from the ED   CBC WITH PLATELETS DIFFERENTIAL - Abnormal; Notable for the following:        Result Value    RBC Count 4.27 (*)     All other components within normal limits   COMPREHENSIVE METABOLIC PANEL - Abnormal; Notable for the following:     Potassium 3.3 (*)     Glucose 150 (*)     All other components within normal limits   LIPASE   PERIPHERAL IV CATHETER            Assessments & Plan (with Medical Decision Making)   Andrew is a 49 year old male with acute chronic abdominal pain. The patient arrived to the ED saying this feels like his typical pain with exacerbations.  He denies any new or unusual component of this. I reviewed several previous ED visits, and all of these presentations do seem similar to this. When I palpate the patient's abdomen, I cannot elicit any significant tenderness. He's got normal vital signs, and he is afebrile. Labs were obtained including a CBC, CMP, and lipase all of which were unremarkable.  He was given a GI cocktail and dose of Toradol. He said his pain was from a, \"12 down to a 7 out of 10,\" but says he is still in a lot of pain. I then asked what normally helps, and he responded, \"Dilaudid.\" I conveyed that I didn't think Dilaudid was not necessarily going to be the best treatment option for today, so we compromised with a dose of oxycodone. After this intervention, he was feeling much improved.    As the patient was about to leave he returned saying that he decided he didn't want to leave anymore because he was still having some pain, even though he said that it did get better with the oxycodone. I said if he was having ongoing pain that we should investigate further with a CT scan. The patient said " "that he didn't want a CT scan, he just wanted more pain medicine. He then said he wanted to be admitted to the hospital for pain medicine and I conveyed to him that we wouldn't be able to admit him to the hospital without any further diagnostics and that acute on chronic pain is not typically an indication for admission.     He then said \"fine then, I will come in every day until someone finally puts me in the hospital\". I conveyed to him that he is certainly welcome to return if he feels that he is not doing well, but if he wants to be investigated further for the cause of his symptoms he will need to agree to be evaluated with other diagnostics and other tests. At this point he pulled out his IV and walked out the ED.     This part of the medical record was transcribed by Samuel Chung, Medical Scribe, from a dictation done by Benita Cash MD.      I have reviewed the nursing notes.    I have reviewed the findings, diagnosis, plan and need for follow up with the patient.    Discharge Medication List as of 5/4/2017 12:06 AM          Final diagnoses:   Abdominal pain of unknown etiology     Minh KELLY am serving as a trained medical scribe to document services personally performed by Benita Cash MD, based on the provider's statements to me.      Benita KELLY MD, was physically present and have reviewed and verified the accuracy of this note documented by Minh Boss.      5/3/2017   [facility], EMERGENCY DEPARTMENT     Benita Cash MD  05/04/17 0033    "

## 2017-05-09 ENCOUNTER — OFFICE VISIT (OUTPATIENT)
Dept: INTERNAL MEDICINE | Facility: CLINIC | Age: 52
End: 2017-05-09

## 2017-05-09 VITALS
WEIGHT: 180 LBS | SYSTOLIC BLOOD PRESSURE: 116 MMHG | DIASTOLIC BLOOD PRESSURE: 83 MMHG | BODY MASS INDEX: 29.95 KG/M2 | HEART RATE: 114 BPM

## 2017-05-09 DIAGNOSIS — R10.9 ABDOMINAL CRAMPING: Primary | ICD-10-CM

## 2017-05-09 DIAGNOSIS — R05.9 COUGH: ICD-10-CM

## 2017-05-09 RX ORDER — FLUTICASONE PROPIONATE 50 MCG
1 SPRAY, SUSPENSION (ML) NASAL DAILY
Qty: 1 BOTTLE | Refills: 1 | Status: SHIPPED | OUTPATIENT
Start: 2017-05-09 | End: 2017-11-07

## 2017-05-09 RX ORDER — DICYCLOMINE HYDROCHLORIDE 10 MG/1
10 CAPSULE ORAL
Qty: 240 CAPSULE | Refills: 1 | Status: SHIPPED | OUTPATIENT
Start: 2017-05-09 | End: 2017-06-22

## 2017-05-09 RX ORDER — MONTELUKAST SODIUM 10 MG/1
10 TABLET ORAL AT BEDTIME
Qty: 90 TABLET | Refills: 3 | Status: SHIPPED | OUTPATIENT
Start: 2017-05-09 | End: 2017-07-10

## 2017-05-09 RX ORDER — ALBUTEROL SULFATE 90 UG/1
2 AEROSOL, METERED RESPIRATORY (INHALATION) EVERY 6 HOURS PRN
Qty: 1 INHALER | Refills: 0 | Status: ON HOLD | OUTPATIENT
Start: 2017-05-09 | End: 2017-11-07

## 2017-05-09 ASSESSMENT — PAIN SCALES - GENERAL: PAINLEVEL: NO PAIN (0)

## 2017-05-09 NOTE — MR AVS SNAPSHOT
After Visit Summary   5/9/2017    Andrew Lockwood    MRN: [id number]           Patient Information     Date Of Birth          11/27/1967        Visit Information        Provider Department      5/9/2017 2:45 PM Rubio Cronin MD [facility]        Today's Diagnoses     Abdominal cramping    -  1    Cough          Care Instructions    Primary Care Center Medication Refill Request Information:  * Please contact your pharmacy regarding ANY request for medication refills.  ** [facility] Prescription Fax = [phone]  * Please allow 3 business days for routine medication refills.  * Please allow 5 business days for controlled substance medication refills.     Primary Care Center Test Result notification information:  *You will be notified within 7-10 days of your appointment day regarding the results of your test.  If you are on MyChart you will be notified as soon as the provider has reviewed the results and signed off on them.          Follow-ups after your visit        Your next 10 appointments already scheduled     May 09, 2017  3:55 PM CDT   (Arrive by 3:40 PM)   XR CHEST 2 VIEWS with UCXR1   [facility] Imaging Center Xray ([facility])    [address] [place] [address] [phone]           Please bring a list of your current medicines to your exam. (Include vitamins, minerals and over-thecounter medicines.) Leave your valuables at home.  Tell your doctor if there is a chance you may be pregnant.  You do not need to do anything special for this exam.            May 10, 2017  1:15 PM CDT   (Arrive by 1:00 PM)   Return Visit with Sachin Becerra MD   [facility] Dermatology ([facility])    [address] [place] [id number] [phone]            May 23, 2017 10:00 AM CDT   (Arrive by 9:45 AM)   New Patient Visit with Jonas Alan   [facility] Gastroenterology and IBD ([facility])    [address] [facility] [facility]  [address] [facility] [address] [phone]              Who to contact     Please call your clinic at [phone] to:    Ask questions about your health    Make or cancel appointments    Discuss your medicines    Learn about your test results    Speak to your doctor   If you have compliments or concerns about an experience at your clinic, or if you wish to file a complaint, please contact [facility] Patient Relations at [phone] or email us at [email or web address]         Additional Information About Your Visit        [facility] Information     [facility] gives you secure access to your electronic health record. If you see a primary care provider, you can also send messages to your care team and make appointments. If you have questions, please call your primary care clinic.  If you do not have a primary care provider, please call [phone] and they will assist you.      [facility] is an electronic gateway that provides easy, online access to your medical records. With [facility], you can request a clinic appointment, read your test results, renew a prescription or communicate with your care team.     To access your existing account, please contact your [facility] or call [phone] for assistance.        Care EveryWhere ID     This is your Care EveryWhere ID. This could be used by other organizations to access your [place] medical records  [id number]        Your Vitals Were     Pulse BMI (Body Mass Index)                114 29.95 kg/m2           Blood Pressure from Last 3 Encounters:   05/09/17 116/83   05/03/17 139/86   04/08/17 107/80    Weight from Last 3 Encounters:   05/09/17 81.6 kg (180 lb)   05/03/17 81.3 kg (179 lb 4.8 oz)   04/08/17 81.2 kg (179 lb 0.2 oz)              We Performed the Following     XR Chest 2 Views          Today's Medication Changes          These changes are  accurate as of: 5/9/17  3:50 PM.  If you have any questions, ask your nurse or doctor.               Start taking these medicines.        Dose/Directions    albuterol 108 (90 BASE) MCG/ACT Inhaler   Commonly known as:  PROAIR HFA/PROVENTIL HFA/VENTOLIN HFA   Used for:  Cough   Started by:  Rubio Cronin MD        Dose:  2 puff   Inhale 2 puffs into the lungs every 6 hours as needed for shortness of breath / dyspnea or wheezing   Quantity:  1 Inhaler   Refills:  0       dicyclomine 10 MG capsule   Commonly known as:  BENTYL   Used for:  Abdominal cramping   Started by:  Rubio Cronin MD        Dose:  10 mg   Take 1 capsule (10 mg) by mouth 4 times daily (before meals and nightly)   Quantity:  240 capsule   Refills:  1       fluticasone 50 MCG/ACT spray   Commonly known as:  FLONASE   Used for:  Abdominal cramping   Started by:  Rubio Cronin MD        Dose:  1 spray   Spray 1 spray into both nostrils daily   Quantity:  1 Bottle   Refills:  1       montelukast 10 MG tablet   Commonly known as:  SINGULAIR   Used for:  Abdominal cramping   Started by:  Rubio Cronin MD        Dose:  10 mg   Take 1 tablet (10 mg) by mouth At Bedtime   Quantity:  90 tablet   Refills:  3         These medicines have changed or have updated prescriptions.        Dose/Directions    GENVOYA [phone] MG Tabs per tablet   This may have changed:  See the new instructions.   Used for:  Human immunodeficiency virus (HIV) disease (H)   Generic drug:  Cwbdepg-Acivh-Nrnufyuk-TenofAF        TAKE ONE TABLET BY MOUTH EVERY DAY   Quantity:  30 tablet   Refills:  11            Where to get your medicines      These medications were sent to [place], [place] - [address] [facility] [address]  [address] [place] [address] [address] [address]    Hours:  TRANSPLANT PHONE NUMBER [phone] Phone:  [phone]     albuterol 108 (90 BASE) MCG/ACT Inhaler    dicyclomine 10 MG capsule    fluticasone 50 MCG/ACT spray     montelukast 10 MG tablet                Primary Care Provider Office Phone # Fax #    Sharon Carrie Rosado [phone]       [address] [address]        Thank you!     Thank you for choosing [facility]  for your care. Our goal is always to provide you with excellent care. Hearing back from our patients is one way we can continue to improve our services. Please take a few minutes to complete the written survey that you may receive in the mail after your visit with us. Thank you!             Your Updated Medication List - Protect others around you: Learn how to safely use, store and throw away your medicines at www.disposemymeds.org.          This list is accurate as of: 5/9/17  3:50 PM.  Always use your most recent med list.                   Brand Name Dispense Instructions for use    albuterol 108 (90 BASE) MCG/ACT Inhaler    PROAIR HFA/PROVENTIL HFA/VENTOLIN HFA    1 Inhaler    Inhale 2 puffs into the lungs every 6 hours as needed for shortness of breath / dyspnea or wheezing       AZITHROMYCIN PO      Take 600 mg by mouth once a week       blood glucose lancets standard    no brand specified    100 Box    Use to test blood sugar four times daily or as directed.       blood glucose monitoring meter device kit    no brand specified    1 kit    Use to test blood sugar four times daily or as directed.       blood glucose monitoring test strip    no brand specified    100 each    Use to test blood sugars four times daily or as directed       dicyclomine 10 MG capsule    BENTYL    240 capsule    Take 1 capsule (10 mg) by mouth 4 times daily (before meals and nightly)       fluticasone 50 MCG/ACT spray    FLONASE    1 Bottle    Spray 1 spray into both nostrils daily       gabapentin 300 MG capsule    NEURONTIN    90 capsule    TAKE ONE CAPSULE BY MOUTH ON DAY 1, THEN TAKE ONE CAPSULE TWICE DAILY ON DAY 2 THEN TAKE ONE CAPSULE THREE  TIMES A DAY.       GENVOYA [phone] MG Tabs per tablet   Generic drug:  Idxvrmg-Polrl-Ttkhdsuz-TenofAF     30 tablet    TAKE ONE TABLET BY MOUTH EVERY DAY       guaiFENesin-codeine 100-10 MG/5ML Soln solution    ROBITUSSIN AC    236 mL    Take 5 mLs by mouth every 4 hours as needed for cough       metFORMIN 500 MG tablet    GLUCOPHAGE    120 tablet    TAKE TWO TABLETS BY MOUTH TWICE A DAY WITH MEALS       montelukast 10 MG tablet    SINGULAIR    90 tablet    Take 1 tablet (10 mg) by mouth At Bedtime       sildenafil 50 MG cap/tab    VIAGRA    30 tablet    Take 0.5 tablets (25 mg) by mouth daily as needed for erectile dysfunction       sulfamethoxazole-trimethoprim 800-160 MG per tablet    BACTRIM DS/SEPTRA DS    60 tablet    TAKE 1 TABLET BY MOUTH 2 TIMES DAILY

## 2017-05-09 NOTE — NURSING NOTE
"Chief Complaint   Patient presents with     Abdominal Pain     Pt is here for abd pain. States he has had it \"forever.\"     Adri Dawson LPN May 9, 2017 2:56 PM     "

## 2017-05-09 NOTE — PROGRESS NOTES
[facility]   Primary Care Clinic      Resident Clinic Note        Visit Date: May 9, 2017    Andrew Lockwood  MRN: [id number]  YOB: 1967    Chief Complaint: Abdominal pain/cramps, cough.       HPI:  Andrew Lockwood is a 49 year old male with history significant for HIV/AIDs who is followed in the infectious disease clinic who comes in for evaluation of cough, congestion symptoms ongoing for the past several weeks.  He denies any fevers or chills.  Cough is productive of yellowish sputum. Endorses occasional wheezing.  Denies any night sweats or hemoptysis.  He was seen for similar symptoms last month - they improved and now have recurred following a trip out of [facility].  No travel out of the country.  Denies any sick contacts.    Abdominal pain:  Endorses history of chronic abdominal pain - unchanged symptoms.  Have been related to constipation/IBS in the past.  No blood in his stools.  No fever or weight loss.  States his symptoms are unchanged from chronic pain symptoms.      Past medical, surgical, family history were reviewed in the chart.      ROS:  10 point ROS was reviewed and negative other than stated in HPI    Medications and Allergies were reviewed in epic.       Vitals:  /83  Pulse 114  Wt 81.6 kg (180 lb)  BMI 29.95 kg/m2   Afebrile 98.2    Vitals:    05/09/17 1458   Weight: 81.6 kg (180 lb)       Physical Exam:  General: NAD  HEENT: Oral mucosa moist and non-erythematous, PERRLA, EOM intact  CV: RRR, normal S1S2, no m/c/r  Resp: Clear to auscultation bilaterally, Wheezing heard in the right lower lobe.    Abd: Soft, non-tender, BS+, no masses appreciated  Extremities: WWP, no pedal edema  Neuro: AAOx3, no lateralizing symptoms or focal neurologic deficits  Psych: Normal mood and affect.      CXR: Impression: Clear chest    Assessment:  Andrew Lockwood is a .49 year old with past medical history as listed above.    Plan:    Andrew was seen today for abdominal  pain.    Diagnoses and all orders for this visit:    Abdominal cramping/extensive evaluation in the past - states antispasmodics have helped in the past - will plan trial bentyl - no clear drug interactions noted - instructed to discuss with his HIV clinic pharmacist.    -     dicyclomine (BENTYL) 10 MG capsule; Take 1 capsule (10 mg) by mouth 4 times daily (before meals and nightly)    Cough/allergic rhinitis.    -     XR Chest 2 Views - no signs of pneumonia.  Pt has also been compliant with his HIV medications.    -     albuterol (PROAIR HFA/PROVENTIL HFA/VENTOLIN HFA) 108 (90 BASE) MCG/ACT Inhaler; Inhale 2 puffs into the lungs every 6 hours as needed for shortness of breath / dyspnea or wheezing  -     montelukast (SINGULAIR) 10 MG tablet; Take 1 tablet (10 mg) by mouth At Bedtime  -     fluticasone (FLONASE) 50 MCG/ACT spray; Spray 1 spray into both nostrils daily      Health Maintenance: Discuss on preventive health visit.      Follow-up:  In 2 weeks for continued evaluation of abdominal pain.      Patient seen and discussed with Dr. Clive Cronin MD,   Internal Medicine PGY-2  [phone]         Attending Addendum:  Patient seen and examined with resident in clinic today.  Pertinent portions of history and exam were independently verified by myself.  I agree with the exam and plan as outlined above with the following modifications: none.  Kate Duffy MD  Internal Medicine

## 2017-05-09 NOTE — PATIENT INSTRUCTIONS
Primary Care Center Medication Refill Request Information:  * Please contact your pharmacy regarding ANY request for medication refills.  ** [facility] Prescription Fax = [phone]  * Please allow 3 business days for routine medication refills.  * Please allow 5 business days for controlled substance medication refills.     [facility] Care Center Test Result notification information:  *You will be notified within 7-10 days of your appointment day regarding the results of your test.  If you are on MyChart you will be notified as soon as the provider has reviewed the results and signed off on them.

## 2017-05-10 ENCOUNTER — OFFICE VISIT (OUTPATIENT)
Dept: DERMATOLOGY | Facility: CLINIC | Age: 52
End: 2017-05-10

## 2017-05-10 DIAGNOSIS — L73.9 FOLLICULITIS: Primary | ICD-10-CM

## 2017-05-10 RX ORDER — MINOCYCLINE HYDROCHLORIDE 50 MG/1
50 CAPSULE ORAL 2 TIMES DAILY
Qty: 60 CAPSULE | Refills: 0 | Status: SHIPPED | OUTPATIENT
Start: 2017-05-10 | End: 2017-11-07

## 2017-05-10 ASSESSMENT — PAIN SCALES - GENERAL: PAINLEVEL: NO PAIN (0)

## 2017-05-10 NOTE — PROGRESS NOTES
[facility] Dermatology Note    Dermatology Problem List:  1. HIV positive - last CD4 count 200 and viral load undetectable per patient  2. 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 Plan for treatment with intralesional bleomycin with Dr. Singh at [facility]. Avoid immunotherapy in the absence of immune system reconstitution. S/p multiple rounds of cryotherapy (4/18, 6/15, 7/22, 8/18, 8/31), Aldara (no reaction), cidofovir 1%, and intermittent topical 5-FU use.    3. Verruca plantaris s/p cryotherapy 4/18, 7/22  4. 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 Folliculitis  - minocycline 50 mg BID, BPO wash    CC:   Chief Complaint   Patient presents with     Derm Problem     Andrew is here today complaining of itchy spots on his upper chest and neck.     Date of Service: May 10, 2017    History of Present Illness:  Mr. Andrew Lockwood is a 49 year old male who presents for an evaluation of pruritic spots on his upper chest and neck. Today the patient reports that the areas are pruritic and some of the spots are dry. He has had this for 2 weeks now. He has not put anything on the spots. He had a pruritic area in his groin area in the past, but this is very different. He also notes that his warts are gone and the resolved on their owns. He also reports a few bumps on his buttocks. His CD4 counts are at about 200. The patient reports no other lesions of concern at this time.    Otherwise, the patient reports no painful, bleeding, nonhealing, or pruritic lesions, and denies new or changing moles.    Past Medical History:   Patient Active Problem List   Diagnosis     Allergic rhinitis due to other allergen     Brain lesion     Toxoplasma encephalitis (H)     Pulmonary nodules     Human immunodeficiency virus (HIV) disease (H)     Folliculitis     Prurigo nodularis     Epidermal cyst     Shoulder joint pain, unspecified laterality     CNS  toxoplasmosis (H)     Constipation     Non-intractable vomiting with nausea, vomiting of unspecified type     Thrush     Insomnia, unspecified insomnia     Bowel obstruction (H)     Toxoplasmosis     Gastroesophageal reflux disease     Headache     Aphthous ulcer     Type 2 diabetes mellitus (H)     Small bowel obstruction (H)     SBO (small bowel obstruction) (H)     Slow transit constipation     Periungual wart     Herpes zoster     Erectile dysfunction, unspecified erectile dysfunction type     Insomnia, unspecified type     Preventative health care     Pain of right thumb     Rupture of ulnar collateral ligament of right thumb     Aftercare following surgery of the musculoskeletal system     Past Medical History:   Diagnosis Date     AIDS (H)      Allergic rhinitis due to other allergen     DNS     Chronic abdominal pain      CNS toxoplasmosis (H)      Diabetes type 2, controlled (H)      GERD (gastroesophageal reflux disease)      HIV (human immunodeficiency virus infection) (H)      Periungual wart      Sleep apnea     doesn't use CPAP     Past Surgical History:   Procedure Laterality Date     C NONSPECIFIC PROCEDURE      right forearm fracture     COLONOSCOPY Left 1/22/2016    Procedure: COMBINED COLONOSCOPY, SINGLE OR MULTIPLE BIOPSY/POLYPECTOMY BY BIOPSY;  Surgeon: Clark Saini MD;  [facility]: [facility]     HC EXPLORE UNDESC TESTIS,INGUIN/SCROTAL       LAPAROSCOPY DIAGNOSTIC (GENERAL) N/A 7/26/2016    Procedure: LAPAROSCOPY DIAGNOSTIC (GENERAL);  Surgeon: Susannah Arriaga MD;  [facility]: [facility]     OPTICAL TRACKING SYSTEM CRANIOTOMY, EXCISE TUMOR, COMBINED Left 4/10/2015    Procedure: COMBINED OPTICAL TRACKING SYSTEM CRANIOTOMY, EXCISE TUMOR;  Surgeon: Mirlande Colmenares MD;  [facility]: [facility]     REPAIR GAMEKEEPER'S THUMB Right 12/2/2016    Procedure: REPAIR LIGAMENT ULNAR COLLATERAL THUMB (GAMEKEEPER'S);  Surgeon: Evin Zamorano MD;  Location:  OR     Social History:  The patient was born  in [facility]. He came to the [place] in [place]. He last traveled to visit his family in 2008.     Family History:  There is no family history of skin cancer.     Medications:  Current Outpatient Prescriptions   Medication Sig Dispense Refill     dicyclomine (BENTYL) 10 MG capsule Take 1 capsule (10 mg) by mouth 4 times daily (before meals and nightly) 240 capsule 1     montelukast (SINGULAIR) 10 MG tablet Take 1 tablet (10 mg) by mouth At Bedtime 90 tablet 3     fluticasone (FLONASE) 50 MCG/ACT spray Spray 1 spray into both nostrils daily 1 Bottle 1     albuterol (PROAIR HFA/PROVENTIL HFA/VENTOLIN HFA) 108 (90 BASE) MCG/ACT Inhaler Inhale 2 puffs into the lungs every 6 hours as needed for shortness of breath / dyspnea or wheezing 1 Inhaler 0     sulfamethoxazole-trimethoprim (BACTRIM DS/SEPTRA DS) 800-160 MG per tablet TAKE 1 TABLET BY MOUTH 2 TIMES DAILY 60 tablet 3     gabapentin (NEURONTIN) 300 MG capsule TAKE ONE CAPSULE BY MOUTH ON DAY 1, THEN TAKE ONE CAPSULE TWICE DAILY ON DAY 2 THEN TAKE ONE CAPSULE THREE TIMES A DAY. 90 capsule 3     metFORMIN (GLUCOPHAGE) 500 MG tablet TAKE TWO TABLETS BY MOUTH TWICE A DAY WITH MEALS 120 tablet 3     GENVOYA [phone] MG PO TABS TAKE ONE TABLET BY MOUTH EVERY DAY (Patient taking differently: TAKE ONE TABLET BY MOUTH EVERY DAY (at bedtime)) 30 tablet 11     AZITHROMYCIN PO Take 600 mg by mouth once a week       sildenafil (VIAGRA) 50 MG cap/tab Take 0.5 tablets (25 mg) by mouth daily as needed for erectile dysfunction (Patient not taking: Reported on 5/10/2017) 30 tablet 0     blood glucose (NO BRAND SPECIFIED) lancets standard Use to test blood sugar four times daily or as directed. 100 Box 5     blood glucose monitoring (NO BRAND SPECIFIED) meter device kit Use to test blood sugar four times daily or as directed. 1 kit 0     blood glucose monitoring (NO BRAND SPECIFIED) test strip Use to test blood sugars four times daily or as directed 100 each 5      Allergies:  Allergies   Allergen Reactions     Milk [Lac Bovis] Hives     Tylenol [Acetaminophen] Itching     Review of Systems:  - Skin: As above in HPI. No additional skin concerns.    Physical exam:  Vitals: There were no vitals taken for this visit.  GEN: This is a well developed, well-nourished male in no acute distress, in a pleasant mood.      SKIN: Full skin, which includes the head/face, both arms, chest, back, abdomen,both legs, genitalia and/or groin buttocks, digits and/or nails, was examined.  - 2 violaceous somewhat indurated papules with some crust on the left buttocks  - More scattered papules on the medially thighs and chest  - No other lesions of concern on areas examined.     Impression/Plan:  1. Folliculitis of the trunk and proximal extremities in an HIV+ patient  - Start minocycline 50 mg BID  - Start BPO wash - apply daily in the shower to body  - Discussion of potential etiologies of folliculitis; bacterial, mechanical, fungal (not monomorphic), eosinophilic (not as widespread as expected for this entity, and associated with either severe immune compromise due to HIV or immune reconstitution, neither of which appear to be the case here)     Follow-up 1 month, earlier for new or changing lesions.    Staff Involved:  Staff Only    Scribe Disclosure:   I, Eric Francisco, am serving as a scribe to document services personally performed by Dr. Sachin Becerra, based on data collection and the provider's statements to me.     Staff attestation:  The documentation recorded by the scribe accurately reflects the services I personally performed and the decisions I personally made.    Sachin Becerra MD  Staff Dermatologist    Department of Dermatology

## 2017-05-10 NOTE — PROGRESS NOTES
Mr Fabián      Your Chest X-ray is normal - no signs of pneumonia.  Continue with the treatment plan as discussed during your visit.  Call if you have any worsening symptoms.      Rubio Cronin MD.

## 2017-05-10 NOTE — LETTER
5/10/2017       RE: Andrew Lockwood  [address]   [place] [address]     Dear Colleague,    Thank you for referring your patient, Andrew Lockwood, to the [facility] DERMATOLOGY at [facility]. Please see a copy of my visit note below.    [facility] Dermatology Note    Dermatology Problem List:  1. HIV positive - last CD4 count 200 and viral load undetectable per patient  2. 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 Plan for treatment with intralesional bleomycin with Dr. Singh at [facility] office. Avoid immunotherapy in the absence of immune system reconstitution. S/p multiple rounds of cryotherapy (4/18, 6/15, 7/22, 8/18, 8/31), Aldara (no reaction), cidofovir 1%, and intermittent topical 5-FU use.    3. Verruca plantaris s/p cryotherapy 4/18, 7/22  4. 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 Folliculitis  - minocycline 50 mg BID, BPO wash    CC:   Chief Complaint   Patient presents with     Derm Problem     Andrew is here today complaining of itchy spots on his upper chest and neck.     Date of Service: May 10, 2017    History of Present Illness:  Mr. Andrew Lockwood is a 49 year old male who presents for an evaluation of pruritic spots on his upper chest and neck. Today the patient reports that the areas are pruritic and some of the spots are dry. He has had this for 2 weeks now. He has not put anything on the spots. He had a pruritic area in his groin area in the past, but this is very different. He also notes that his warts are gone and the resolved on their owns. He also reports a few bumps on his buttocks. His CD4 counts are at about 200. The patient reports no other lesions of concern at this time.    Otherwise, the patient reports no painful, bleeding, nonhealing, or pruritic lesions, and denies new or changing moles.    Past Medical History:   Patient Active Problem List   Diagnosis      Allergic rhinitis due to other allergen     Brain lesion     Toxoplasma encephalitis (H)     Pulmonary nodules     Human immunodeficiency virus (HIV) disease (H)     Folliculitis     Prurigo nodularis     Epidermal cyst     Shoulder joint pain, unspecified laterality     CNS toxoplasmosis (H)     Constipation     Non-intractable vomiting with nausea, vomiting of unspecified type     Thrush     Insomnia, unspecified insomnia     Bowel obstruction (H)     Toxoplasmosis     Gastroesophageal reflux disease     Headache     Aphthous ulcer     Type 2 diabetes mellitus (H)     Small bowel obstruction (H)     SBO (small bowel obstruction) (H)     Slow transit constipation     Periungual wart     Herpes zoster     Erectile dysfunction, unspecified erectile dysfunction type     Insomnia, unspecified type     Preventative health care     Pain of right thumb     Rupture of ulnar collateral ligament of right thumb     Aftercare following surgery of the musculoskeletal system     Past Medical History:   Diagnosis Date     AIDS (H)      Allergic rhinitis due to other allergen     DNS     Chronic abdominal pain      CNS toxoplasmosis (H)      Diabetes type 2, controlled (H)      GERD (gastroesophageal reflux disease)      HIV (human immunodeficiency virus infection) (H)      Periungual wart      Sleep apnea     doesn't use CPAP     Past Surgical History:   Procedure Laterality Date     C NONSPECIFIC PROCEDURE      right forearm fracture     COLONOSCOPY Left 1/22/2016    Procedure: COMBINED COLONOSCOPY, SINGLE OR MULTIPLE BIOPSY/POLYPECTOMY BY BIOPSY;  Surgeon: Clark Saini MD;  [facility]     HC EXPLORE UNDESC TESTIS,INGUIN/SCROTAL       LAPAROSCOPY DIAGNOSTIC (GENERAL) N/A 7/26/2016    Procedure: LAPAROSCOPY DIAGNOSTIC (GENERAL);  Surgeon: Susannah Arriaga MD;  Location:  OR     OPTICAL TRACKING SYSTEM CRANIOTOMY, EXCISE TUMOR, COMBINED Left 4/10/2015    Procedure: COMBINED OPTICAL TRACKING SYSTEM CRANIOTOMY,  EXCISE TUMOR;  Surgeon: Mirlande Colmenares MD;  [facility]     REPAIR GAMEKEEPER'S THUMB Right 12/2/2016    Procedure: REPAIR LIGAMENT ULNAR COLLATERAL THUMB (GAMEKEEPER'S);  Surgeon: Evin Zamorano MD;  Location:  OR     Social History:  The patient was born in [facility] [place]. He came [place] the [place] in [place]. He last traveled to visit his family in 2008.     Family History:  There is no family history of skin cancer.     Medications:  Current Outpatient Prescriptions   Medication Sig Dispense Refill     dicyclomine (BENTYL) 10 MG capsule Take 1 capsule (10 mg) by mouth 4 times daily (before meals and nightly) 240 capsule 1     montelukast (SINGULAIR) 10 MG tablet Take 1 tablet (10 mg) by mouth At Bedtime 90 tablet 3     fluticasone (FLONASE) 50 MCG/ACT spray Spray 1 spray into both nostrils daily 1 Bottle 1     albuterol (PROAIR HFA/PROVENTIL HFA/VENTOLIN HFA) 108 (90 BASE) MCG/ACT Inhaler Inhale 2 puffs into the lungs every 6 hours as needed for shortness of breath / dyspnea or wheezing 1 Inhaler 0     sulfamethoxazole-trimethoprim (BACTRIM DS/SEPTRA DS) 800-160 MG per tablet TAKE 1 TABLET BY MOUTH 2 TIMES DAILY 60 tablet 3     gabapentin (NEURONTIN) 300 MG capsule TAKE ONE CAPSULE BY MOUTH ON DAY 1, THEN TAKE ONE CAPSULE TWICE DAILY ON DAY 2 THEN TAKE ONE CAPSULE THREE TIMES A DAY. 90 capsule 3     metFORMIN (GLUCOPHAGE) 500 MG tablet TAKE TWO TABLETS BY MOUTH TWICE A DAY WITH MEALS 120 tablet 3     GENVOYA [phone] MG PO TABS TAKE ONE TABLET BY MOUTH EVERY DAY (Patient taking differently: TAKE ONE TABLET BY MOUTH EVERY DAY (at bedtime)) 30 tablet 11     AZITHROMYCIN PO Take 600 mg by mouth once a week       sildenafil (VIAGRA) 50 MG cap/tab Take 0.5 tablets (25 mg) by mouth daily as needed for erectile dysfunction (Patient not taking: Reported on 5/10/2017) 30 tablet 0     blood glucose (NO BRAND SPECIFIED) lancets standard Use to test blood sugar four times daily or as directed. 100 Box 5      blood glucose monitoring (NO BRAND SPECIFIED) meter device kit Use to test blood sugar four times daily or as directed. 1 kit 0     blood glucose monitoring (NO BRAND SPECIFIED) test strip Use to test blood sugars four times daily or as directed 100 each 5     Allergies:  Allergies   Allergen Reactions     Milk [Lac Bovis] Hives     Tylenol [Acetaminophen] Itching     Review of Systems:  - Skin: As above in HPI. No additional skin concerns.    Physical exam:  Vitals: There were no vitals taken for this visit.  GEN: This is a well developed, well-nourished male in no acute distress, in a pleasant mood.      SKIN: Full skin, which includes the head/face, both arms, chest, back, abdomen,both legs, genitalia and/or groin buttocks, digits and/or nails, was examined.  - 2 violaceous somewhat indurated papules with some crust on the left buttocks  - More scattered papules on the medially thighs and chest  - No other lesions of concern on areas examined.     Impression/Plan:  1. Folliculitis of the trunk and proximal extremities in an HIV+ patient  - Start minocycline 50 mg BID  - Start BPO wash - apply daily in the shower to body  - Discussion of potential etiologies of folliculitis; bacterial, mechanical, fungal (not monomorphic), eosinophilic (not as widespread as expected for this entity, and associated with either severe immune compromise due to HIV or immune reconstitution, neither of which appear to be the case here)     Follow-up 1 month, earlier for new or changing lesions.    Staff Involved:  Staff Only    Scribe Disclosure:   I, Eric Francisco, am serving as a scribe to document services personally performed by Dr. Sachin Becerra, based on data collection and the provider's statements to me.     Staff attestation:  The documentation recorded by the scribe accurately reflects the services I personally performed and the decisions I personally made.    Sachin Becerra MD  Staff Dermatologist  Assistant  Professor  Department of Dermatology

## 2017-05-10 NOTE — MR AVS SNAPSHOT
After Visit Summary   5/10/2017    Andrew Lockwood    MRN: [id number]           Patient Information     Date Of Birth          11/27/1967        Visit Information        Provider Department      5/10/2017 1:15 PM Sachin Becerra MD [facility]        Today's Diagnoses     Folliculitis    -  1       Follow-ups after your visit        Your next 10 appointments already scheduled     May 23, 2017 10:45 AM CDT   (Arrive by 10:30 AM)   Return Visit with Rubio Cronin MD   [facility] ([facility] [place])    [address] [place] [address] [phone]              Who to contact     Please call your clinic at [phone] to:    Ask questions about your health    Make or cancel appointments    Discuss your medicines    Learn about your test results    Speak to your doctor   If you have compliments or concerns about an experience at your clinic, or if you wish to file a complaint, please contact [facility] Patient Relations at [phone] or email us at [email or web address]         Additional Information About Your Visit        MyChart Information     zappitt gives you secure access to your electronic health record. If you see a primary care provider, you can also send messages to your care team and make appointments. If you have questions, please call your primary care clinic.  If you do not have a primary care provider, please call [phone] and they will assist you.      Cinpost is an electronic gateway that provides easy, online access to your medical records. With [employer]post, you can request a clinic appointment, read your test results, renew a prescription or communicate with your care team.     To access your existing account, please contact your [facility] or call [phone] for assistance.        Care EveryWhere ID     This is your Care EveryWhere ID. This  could be used by other organizations to access your [facility] medical records  [id number]         Blood Pressure from Last 3 Encounters:   05/09/17 116/83   05/03/17 139/86   04/08/17 107/80    Weight from Last 3 Encounters:   05/09/17 81.6 kg (180 lb)   05/03/17 81.3 kg (179 lb 4.8 oz)   04/08/17 81.2 kg (179 lb 0.2 oz)              Today, you had the following     No orders found for display         Today's Medication Changes          These changes are accurate as of: 5/10/17 11:59 PM.  If you have any questions, ask your nurse or doctor.               Start taking these medicines.        Dose/Directions    benzoyl peroxide 5 % Liqd   Used for:  Folliculitis   Started by:  Sachin Becerra MD        Use daily in shower   Quantity:  226 g   Refills:  11       minocycline 50 MG capsule   Commonly known as:  MINOCIN/DYNACIN   Used for:  Folliculitis   Started by:  Sachin Becerra MD        Dose:  50 mg   Take 1 capsule (50 mg) by mouth 2 times daily   Quantity:  60 capsule   Refills:  0         These medicines have changed or have updated prescriptions.        Dose/Directions    GENVOYA [phone] MG Tabs per tablet   This may have changed:  See the new instructions.   Used for:  Human immunodeficiency virus (HIV) disease (H)   Generic drug:  Poqklez-Lfcer-Pgbozojq-TenofAF        TAKE ONE TABLET BY MOUTH EVERY DAY   Quantity:  30 tablet   Refills:  11         Stop taking these medicines if you haven't already. Please contact your care team if you have questions.     guaiFENesin-codeine 100-10 MG/5ML Soln solution   Commonly known as:  ROBITUSSIN AC   Stopped by:  Sachin Becerra MD                Where to get your medicines      These medications were sent to [facility] [address] [facility] [address] [place] [address] [address] [address]    Hours:  TRANSPLANT PHONE NUMBER [phone] Phone:  [phone]     benzoyl peroxide 5 % Liqd    minocycline  50 MG capsule                Primary Care Provider Office Phone # Fax #    Sharon Carrie Brandan Rosado [phone]       [address] [address]        Thank you!     Thank you for choosing [facility]  for your care. Our goal is always to provide you with excellent care. Hearing back from our patients is one way we can continue to improve our services. Please take a few minutes to complete the written survey that you may receive in the mail after your visit with us. Thank you!             Your Updated Medication List - Protect others around you: Learn how to safely use, store and throw away your medicines at www.disposemymeds.org.          This list is accurate as of: 5/10/17 11:59 PM.  Always use your most recent med list.                   Brand Name Dispense Instructions for use    albuterol 108 (90 BASE) MCG/ACT Inhaler    PROAIR HFA/PROVENTIL HFA/VENTOLIN HFA    1 Inhaler    Inhale 2 puffs into the lungs every 6 hours as needed for shortness of breath / dyspnea or wheezing       AZITHROMYCIN PO      Take 600 mg by mouth once a week       benzoyl peroxide 5 % Liqd     226 g    Use daily in shower       blood glucose lancets standard    no brand specified    100 Box    Use to test blood sugar four times daily or as directed.       blood glucose monitoring meter device kit    no brand specified    1 kit    Use to test blood sugar four times daily or as directed.       blood glucose monitoring test strip    no brand specified    100 each    Use to test blood sugars four times daily or as directed       dicyclomine 10 MG capsule    BENTYL    240 capsule    Take 1 capsule (10 mg) by mouth 4 times daily (before meals and nightly)       fluticasone 50 MCG/ACT spray    FLONASE    1 Bottle    Spray 1 spray into both nostrils daily       gabapentin 300 MG capsule    NEURONTIN    90 capsule    TAKE ONE CAPSULE BY MOUTH ON DAY 1, THEN TAKE ONE CAPSULE TWICE  DAILY ON DAY 2 THEN TAKE ONE CAPSULE THREE TIMES A DAY.       GENVOYA [phone] MG Tabs per tablet   Generic drug:  Gepqwov-Hgzag-Wgciixym-TenofAF     30 tablet    TAKE ONE TABLET BY MOUTH EVERY DAY       metFORMIN 500 MG tablet    GLUCOPHAGE    120 tablet    TAKE TWO TABLETS BY MOUTH TWICE A DAY WITH MEALS       minocycline 50 MG capsule    MINOCIN/DYNACIN    60 capsule    Take 1 capsule (50 mg) by mouth 2 times daily       montelukast 10 MG tablet    SINGULAIR    90 tablet    Take 1 tablet (10 mg) by mouth At Bedtime       sildenafil 50 MG cap/tab    VIAGRA    30 tablet    Take 0.5 tablets (25 mg) by mouth daily as needed for erectile dysfunction       sulfamethoxazole-trimethoprim 800-160 MG per tablet    BACTRIM DS/SEPTRA DS    60 tablet    TAKE 1 TABLET BY MOUTH 2 TIMES DAILY

## 2017-05-13 ENCOUNTER — HOSPITAL ENCOUNTER (EMERGENCY)
Facility: CLINIC | Age: 52
Discharge: HOME | End: 2017-05-13
Attending: EMERGENCY MEDICINE | Admitting: EMERGENCY MEDICINE
Payer: COMMERCIAL

## 2017-05-13 ENCOUNTER — APPOINTMENT (OUTPATIENT)
Dept: CT IMAGING | Facility: CLINIC | Age: 52
End: 2017-05-13
Attending: EMERGENCY MEDICINE
Payer: COMMERCIAL

## 2017-05-13 VITALS
HEART RATE: 94 BPM | OXYGEN SATURATION: 98 % | RESPIRATION RATE: 18 BRPM | WEIGHT: 180 LBS | HEIGHT: 65 IN | DIASTOLIC BLOOD PRESSURE: 91 MMHG | TEMPERATURE: 98.4 F | BODY MASS INDEX: 29.99 KG/M2 | SYSTOLIC BLOOD PRESSURE: 135 MMHG
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
## 2017-05-13 DIAGNOSIS — R04.0 EPISTAXIS: ICD-10-CM
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
## 2017-05-13 DIAGNOSIS — H11.32 CONJUNCTIVAL HEMORRHAGE OF LEFT EYE: ICD-10-CM

## 2017-05-13 DIAGNOSIS — S00.12XA CONTUSION OF LEFT EYELID, INITIAL ENCOUNTER: ICD-10-CM

## 2017-05-13 PROCEDURE — 25000132 ZZH RX MED GY IP 250 OP 250 PS 637: Performed by: EMERGENCY MEDICINE

## 2017-05-13 PROCEDURE — 70450 CT HEAD/BRAIN W/O DYE: CPT

## 2017-05-13 PROCEDURE — 99283 EMERGENCY DEPT VISIT LOW MDM: CPT | Mod: Z6 | Performed by: EMERGENCY MEDICINE

## 2017-05-13 PROCEDURE — 70486 CT MAXILLOFACIAL W/O DYE: CPT

## 2017-05-13 PROCEDURE — 99284 EMERGENCY DEPT VISIT MOD MDM: CPT | Mod: 25 | Performed by: EMERGENCY MEDICINE

## 2017-05-13 RX ORDER — OXYCODONE HYDROCHLORIDE 5 MG/1
5 TABLET ORAL ONCE
Status: COMPLETED | OUTPATIENT
Start: 2017-05-13 | End: 2017-05-13

## 2017-05-13 RX ADMIN — OXYCODONE HYDROCHLORIDE 5 MG: 5 TABLET ORAL at 19:54

## 2017-05-13 ASSESSMENT — ENCOUNTER SYMPTOMS
SHORTNESS OF BREATH: 0
HEADACHES: 1
ABDOMINAL PAIN: 0
FEVER: 0
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
## 2017-05-13 NOTE — ED AVS SNAPSHOT
[facility], [place], Emergency Department    [address] [place] [address]    Phone:  [phone]                                       Andrew Lockwood   MRN: [id number]    Department:  [facility], Emergency Department   Date of Visit:  5/13/2017           After Visit Summary Signature Page     I have received my discharge instructions, and my questions have been answered. I have discussed any challenges I see with this plan with the nurse or doctor.    ..........................................................................................................................................  Patient/Patient Representative Signature      ..........................................................................................................................................  Patient Representative Print Name and Relationship to Patient    ..................................................               ................................................  Date                                            Time    ..........................................................................................................................................  Reviewed by Signature/Title    ...................................................              ..............................................  Date                                                            Time
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
## 2017-05-15 NOTE — TELEPHONE ENCOUNTER
received a staff message from Dr. Rosado asking to connect Patient with various resources to address social concerns.  spoke with [facility] , Simon Cadena, who stated that Patient is very well connected with all possible community resources and is in stable housing.  will be available if any specific needs arise.    Toyin Villafuerte, LICSW  Pager:  [phone]

## 2017-05-17 ENCOUNTER — OFFICE VISIT (OUTPATIENT)
Dept: OPHTHALMOLOGY | Facility: CLINIC | Age: 52
End: 2017-05-17

## 2017-05-17 ENCOUNTER — OFFICE VISIT (OUTPATIENT)
Dept: FAMILY MEDICINE | Facility: CLINIC | Age: 52
End: 2017-05-17

## 2017-05-17 VITALS
WEIGHT: 181 LBS | SYSTOLIC BLOOD PRESSURE: 117 MMHG | DIASTOLIC BLOOD PRESSURE: 82 MMHG | HEART RATE: 67 BPM | BODY MASS INDEX: 30.12 KG/M2

## 2017-05-17 DIAGNOSIS — R42 VERTIGO: ICD-10-CM

## 2017-05-17 DIAGNOSIS — H11.33 CONJUNCTIVAL HEMORRHAGE, BILATERAL: ICD-10-CM

## 2017-05-17 DIAGNOSIS — R58 ECCHYMOSIS: ICD-10-CM

## 2017-05-17 DIAGNOSIS — H20.042 TRAUMATIC IRIDOCYCLITIS OF LEFT EYE: Primary | ICD-10-CM

## 2017-05-17 DIAGNOSIS — R42 VERTIGO: Primary | ICD-10-CM

## 2017-05-17 LAB
ANION GAP SERPL CALCULATED.3IONS-SCNC: 10 MMOL/L (ref 3–14)
BUN SERPL-MCNC: 12 MG/DL (ref 7–30)
CALCIUM SERPL-MCNC: 9.6 MG/DL (ref 8.5–10.1)
CHLORIDE SERPL-SCNC: 106 MMOL/L (ref 94–109)
CO2 SERPL-SCNC: 24 MMOL/L (ref 20–32)
CREAT SERPL-MCNC: 0.8 MG/DL (ref 0.66–1.25)
GFR SERPL CREATININE-BSD FRML MDRD: NORMAL ML/MIN/1.7M2
GLUCOSE SERPL-MCNC: 93 MG/DL (ref 70–99)
POTASSIUM SERPL-SCNC: 4.1 MMOL/L (ref 3.4–5.3)
SODIUM SERPL-SCNC: 139 MMOL/L (ref 133–144)

## 2017-05-17 RX ORDER — CYCLOPENTOLATE HYDROCHLORIDE 10 MG/ML
1 SOLUTION/ DROPS OPHTHALMIC 2 TIMES DAILY
Qty: 1 BOTTLE | Refills: 0 | Status: SHIPPED | OUTPATIENT
Start: 2017-05-17 | End: 2017-11-07

## 2017-05-17 RX ORDER — PREDNISOLONE ACETATE 10 MG/ML
1 SUSPENSION/ DROPS OPHTHALMIC 4 TIMES DAILY
Qty: 1 BOTTLE | Refills: 0 | Status: ON HOLD | OUTPATIENT
Start: 2017-05-17 | End: 2017-11-07

## 2017-05-17 RX ORDER — MECLIZINE HYDROCHLORIDE 25 MG/1
25 TABLET ORAL 3 TIMES DAILY PRN
Qty: 20 TABLET | Refills: 1 | Status: ON HOLD | OUTPATIENT
Start: 2017-05-17 | End: 2017-11-07

## 2017-05-17 ASSESSMENT — REFRACTION_MANIFEST
OD_CYLINDER: +0.75
OD_SPHERE: PLANO
OD_AXIS: 175

## 2017-05-17 ASSESSMENT — EXTERNAL EXAM - RIGHT EYE: OD_EXAM: NORMAL

## 2017-05-17 ASSESSMENT — CONF VISUAL FIELD
OD_NORMAL: 1
METHOD: COUNTING FINGERS
OS_NORMAL: 1

## 2017-05-17 ASSESSMENT — PAIN SCALES - GENERAL: PAINLEVEL: EXTREME PAIN (9)

## 2017-05-17 ASSESSMENT — VISUAL ACUITY
OS_SC: 20/50
OS_PH_SC: 20/30
METHOD: SNELLEN - LINEAR
OD_SC+: -1
OD_SC: 20/25

## 2017-05-17 ASSESSMENT — TONOMETRY
OS_IOP_MMHG: 16
IOP_METHOD: ICARE
OD_IOP_MMHG: 20

## 2017-05-17 ASSESSMENT — SLIT LAMP EXAM - LIDS
COMMENTS: ECCHYMOSIS
COMMENTS: ECCHYMOSIS

## 2017-05-17 ASSESSMENT — CUP TO DISC RATIO
OS_RATIO: 0.3
OD_RATIO: 0.3

## 2017-05-17 ASSESSMENT — EXTERNAL EXAM - LEFT EYE: OS_EXAM: NORMAL

## 2017-05-17 NOTE — MR AVS SNAPSHOT
After Visit Summary   5/17/2017    Andrew Lockwood    MRN: [id number]           Patient Information     Date Of Birth          11/27/1967        Visit Information        Provider Department      5/17/2017 1:20 PM Hernando White OD [facility]        Today's Diagnoses     Traumatic iridocyclitis of left eye    -  1    Conjunctival hemorrhage, bilateral        Ecchymosis           Follow-ups after your visit        Follow-up notes from your care team     Return in about 1 week (around 5/24/2017) for Follow Up.      Your next 10 appointments already scheduled     May 19, 2017  1:15 PM CDT   (Arrive by 1:00 PM)   Return Visit with Sachin Becerra MD   [facility] ([facility])    [address] [place] [address]  [address] [facility] [address] [phone]            May 23, 2017 10:45 AM CDT   (Arrive by 10:30 AM)   Return Visit with Rubio Cronin MD   [facility] ([facility])    [address] [place] [address]  [address] [facility] [address] [phone]            May 24, 2017  2:20 PM CDT   (Arrive by 2:05 PM)   RETURN GENERAL with Hernando White OD   [facility] ([facility])    [address] [place] [address]  [address] [facility] [address] [phone]              Future tests that were ordered for you today     Open Future Orders        Priority Expected Expires Ordered    Basic metabolic panel Routine 5/17/2017 5/31/2017 5/17/2017            Who to contact     Please call your clinic at [phone] to:    Ask questions about your health    Make or cancel appointments    Discuss your medicines    Learn about your test results    Speak to your doctor   If you have compliments or concerns about an experience at your clinic, or if you wish to file a complaint, please contact [facility] Patient Relations at [phone] or email us at  [email or web address]         Additional Information About Your Visit        [employer] Information     [employer] gives you secure access to your electronic health record. If you see a primary care provider, you can also send messages to your care team and make appointments. If you have questions, please call your primary care clinic.  If you do not have a primary care provider, please call [phone] and they will assist you.      [employer] is an electronic gateway that provides easy, online access to your medical records. With [employer], you can request a clinic appointment, read your test results, renew a prescription or communicate with your care team.     To access your existing account, please contact your [facility] or call [phone] for assistance.        Care EveryWhere ID     This is your Care EveryWhere ID. This could be used by other organizations to access your [facility] medical records  [id number]         Blood Pressure from Last 3 Encounters:   05/17/17 117/82   05/13/17 (!) 135/91   05/09/17 116/83    Weight from Last 3 Encounters:   05/17/17 82.1 kg (181 lb)   05/13/17 81.6 kg (180 lb)   05/09/17 81.6 kg (180 lb)              Today, you had the following     No orders found for display         Today's Medication Changes          These changes are accurate as of: 5/17/17  3:30 PM.  If you have any questions, ask your nurse or doctor.               Start taking these medicines.        Dose/Directions    cyclopentolate 1 % ophthalmic solution   Commonly known as:  CYCLOGYL   Used for:  Traumatic iridocyclitis of left eye   Started by:  Hernando White, DREW        Dose:  1 drop   Place 1 drop Into the left eye 2 times daily   Quantity:  1 Bottle   Refills:  0       meclizine 25 MG tablet   Commonly known as:  ANTIVERT   Used for:  Vertigo   Started by:  Chevy Reynoso MD        Dose:  25 mg   Take 1 tablet (25 mg) by mouth 3 times daily as needed for  dizziness   Quantity:  20 tablet   Refills:  1       prednisoLONE acetate 1 % ophthalmic susp   Commonly known as:  PRED FORTE   Used for:  Traumatic iridocyclitis of left eye   Started by:  Hernando White, OD        Dose:  1 drop   Place 1 drop Into the left eye 4 times daily   Quantity:  1 Bottle   Refills:  0         These medicines have changed or have updated prescriptions.        Dose/Directions    GENVOYA [id number] MG Tabs per tablet   This may have changed:  See the new instructions.   Used for:  Human immunodeficiency virus (HIV) disease (H)   Generic drug:  Awyqmvf-Xrgyn-Senphviy-TenofAF        TAKE ONE TABLET BY MOUTH EVERY DAY   Quantity:  30 tablet   Refills:  11            Where to get your medicines      These medications were sent to [address]  [address] [place] [address] [address] [address]    Hours:  TRANSPLANT PHONE NUMBER [phone] Phone:  [phone]     cyclopentolate 1 % ophthalmic solution    meclizine 25 MG tablet    prednisoLONE acetate 1 % ophthalmic susp                Primary Care Provider Office Phone # Fax #    Sharon Rosado [phone]       [address]  [facility] [address]        Thank you!     Thank you for choosing [facility] OPHTH[facility]  for your care. Our goal is always to provide you with excellent care. Hearing back from our patients is one way we can continue to improve our services. Please take a few minutes to complete the written survey that you may receive in the mail after your visit with us. Thank you!             Your Updated Medication List - Protect others around you: Learn how to safely use, store and throw away your medicines at www.disposemymeds.org.          This list is accurate as of: 5/17/17  3:30 PM.  Always use your most recent med list.                   Brand Name Dispense Instructions for use    albuterol 108 (90  BASE) MCG/ACT Inhaler    PROAIR HFA/PROVENTIL HFA/VENTOLIN HFA    1 Inhaler    Inhale 2 puffs into the lungs every 6 hours as needed for shortness of breath / dyspnea or wheezing       AZITHROMYCIN PO      Take 600 mg by mouth once a week       benzoyl peroxide 5 % Liqd     226 g    Use daily in shower       blood glucose lancets standard    no brand specified    100 Box    Use to test blood sugar four times daily or as directed.       blood glucose monitoring meter device kit    no brand specified    1 kit    Use to test blood sugar four times daily or as directed.       blood glucose monitoring test strip    no brand specified    100 each    Use to test blood sugars four times daily or as directed       cyclopentolate 1 % ophthalmic solution    CYCLOGYL    1 Bottle    Place 1 drop Into the left eye 2 times daily       dicyclomine 10 MG capsule    BENTYL    240 capsule    Take 1 capsule (10 mg) by mouth 4 times daily (before meals and nightly)       fluticasone 50 MCG/ACT spray    FLONASE    1 Bottle    Spray 1 spray into both nostrils daily       gabapentin 300 MG capsule    NEURONTIN    90 capsule    TAKE ONE CAPSULE BY MOUTH ON DAY 1, THEN TAKE ONE CAPSULE TWICE DAILY ON DAY 2 THEN TAKE ONE CAPSULE THREE TIMES A DAY.       GENVOYA [id number] MG Tabs per tablet   Generic drug:  Lhitiga-Jbqjm-Oqweioie-TenofAF     30 tablet    TAKE ONE TABLET BY MOUTH EVERY DAY       meclizine 25 MG tablet    ANTIVERT    20 tablet    Take 1 tablet (25 mg) by mouth 3 times daily as needed for dizziness       metFORMIN 500 MG tablet    GLUCOPHAGE    120 tablet    TAKE TWO TABLETS BY MOUTH TWICE A DAY WITH MEALS       minocycline 50 MG capsule    MINOCIN/DYNACIN    60 capsule    Take 1 capsule (50 mg) by mouth 2 times daily       montelukast 10 MG tablet    SINGULAIR    90 tablet    Take 1 tablet (10 mg) by mouth At Bedtime       prednisoLONE acetate 1 % ophthalmic susp    PRED FORTE    1 Bottle    Place 1 drop Into the left eye 4  times daily       saline 0.65 % (SOLN) Soln    AYR SALINE NASAL DROPS    1 Bottle    Spray 1-2 drops in nostril 2 times daily       sildenafil 50 MG cap/tab    VIAGRA    30 tablet    Take 0.5 tablets (25 mg) by mouth daily as needed for erectile dysfunction       sulfamethoxazole-trimethoprim 800-160 MG per tablet    BACTRIM DS/SEPTRA DS    60 tablet    TAKE 1 TABLET BY MOUTH 2 TIMES DAILY
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
## 2017-05-17 NOTE — MR AVS SNAPSHOT
After Visit Summary   5/17/2017    Andrew Lockwood    MRN: [id number]           Patient Information     Date Of Birth          11/27/1967        Visit Information        Provider Department      5/17/2017 12:35 PM Chevy Reynoso MD [facility]        Today's Diagnoses     Vertigo    -  1      Care Instructions    Primary Care Center Medication Refill Request Information:  * Please contact your pharmacy regarding ANY request for medication refills.  ** [facility] Prescription Fax = [phone]  * Please allow 3 business days for routine medication refills.  * Please allow 5 business days for controlled substance medication refills.     Primary Care Center Test Result notification information:  *You will be notified with in 7-10 days of your appointment day regarding the results of your test.  If you are on MyChart you will be notified as soon as the provider has reviewed the results and signed off on them.    [facility] [phone]           Follow-ups after your visit        Additional Services     OTOLARYNGOLOGY REFERRAL       Your provider has referred you to: [facility]: Adult Ear, Nose [facility] Throat Clinic (Otolaryngology) [facility] [phone]  [email or web address]    Please be aware that coverage of these services is subject to the terms and limitations of your health insurance plan.  Call member services at your health plan with any benefit or coverage questions.      Please bring the following with you to your appointment:    (1) Any X-Rays, CTs or MRIs which have been performed.  Contact the facility where they were done to arrange for  prior to your scheduled appointment.   (2) List of current medications  (3) This referral request   (4) Any documents/labs given to you for this referral            PHYSICAL THERAPY REFERRAL       *This therapy referral will be filtered to a centralized scheduling office at [facility] and the patient will receive a call to schedule an appointment at a [facility] location most convenient for them. *     [facility] provides Physical Therapy evaluation and treatment and many specialty services across the [facility] system.  If requesting a specialty program, please choose from the list below.    If you have not heard from the scheduling office within 2 business days, please call [phone] for all locations, with the exception of [place], please call [phone].  Treatment: Evaluation & Treatment  Special Instructions/Modalities: Vertigo  Special Programs: Balance/Vestibular    Please be aware that coverage of these services is subject to the terms and limitations of your health insurance plan.  Call member services at your health plan with any benefit or coverage questions.      **Note to Provider:  If you are referring outside of [facility] for the therapy appointment, please list the name of the location in the  special instructions  above, print the referral and give to the patient to schedule the appointment.                  Your next 10 appointments already scheduled     May 17, 2017  1:20 PM CDT   (Arrive by 1:05 PM)   RETURN GENERAL with Hernando White OD   [facility] Ophthalmology ([facility])    [address] [place] [address] [id number] [phone]            May 19, 2017  1:15 PM CDT   (Arrive by 1:00 PM)   Return Visit with Sachin Becerra MD   [facility] Dermatology ([facility])    [address] [place] [address] [id number] [phone]            May 23, 2017 10:45 AM CDT   (Arrive by 10:30 AM)   Return Visit with Rubio Cronin MD   [facility] ([facility])    [address] [place] [address] [id number] [phone]              Future tests that were ordered for you today     Open Future Orders         Priority Expected Expires Ordered    Basic metabolic panel Routine 5/17/2017 5/31/2017 5/17/2017            Who to contact     Please call your clinic at [phone] to:    Ask questions about your health    Make or cancel appointments    Discuss your medicines    Learn about your test results    Speak to your doctor   If you have compliments or concerns about an experience at your clinic, or if you wish to file a complaint, please contact [facility] Patient Relations at [phone] or email us at [email or web address]         Additional Information About Your Visit        [employer] Information     [employer] gives you secure access to your electronic health record. If you see a primary care provider, you can also send messages to your care team and make appointments. If you have questions, please call your primary care clinic.  If you do not have a primary care provider, please call [phone] and they will assist you.      [employer] is an electronic gateway that provides easy, online access to your medical records. With [employer], you can request a clinic appointment, read your test results, renew a prescription or communicate with your care team.     To access your existing account, please contact your [facility] or call [phone] for assistance.        Care EveryWhere ID     This is your Care EveryWhere ID. This could be used by other organizations to access your [facility] medical records  [id number]        Your Vitals Were     Pulse BMI (Body Mass Index)                67 30.12 kg/m2           Blood Pressure from Last 3 Encounters:   05/17/17 117/82   05/13/17 (!) 135/91   05/09/17 116/83    Weight from Last 3 Encounters:   05/17/17 82.1 kg (181 lb)   05/13/17 81.6 kg (180 lb)   05/09/17 81.6 kg (180 lb)              We Performed the Following     OTOLARYNGOLOGY REFERRAL     PHYSICAL THERAPY REFERRAL          Today's Medication Changes           These changes are accurate as of: 5/17/17  1:08 PM.  If you have any questions, ask your nurse or doctor.               Start taking these medicines.        Dose/Directions    meclizine 25 MG tablet   Commonly known as:  ANTIVERT   Used for:  Vertigo   Started by:  Chevy Reynoso MD        Dose:  25 mg   Take 1 tablet (25 mg) by mouth 3 times daily as needed for dizziness   Quantity:  20 tablet   Refills:  1         These medicines have changed or have updated prescriptions.        Dose/Directions    GENVOYA [phone] MG Tabs per tablet   This may have changed:  See the new instructions.   Used for:  Human immunodeficiency virus (HIV) disease (H)   Generic drug:  Vnwzilx-Opfbs-Bockgsaj-TenofAF        TAKE ONE TABLET BY MOUTH EVERY DAY   Quantity:  30 tablet   Refills:  11            Where to get your medicines      These medications were sent to [address]  [address] [place] [address] [address] [place], [place] [id number]    Hours:  TRANSPLANT PHONE NUMBER [phone] Phone:  [phone]     meclizine 25 MG tablet                Primary Care Provider Office Phone # Fax #    Sharon Rosado [phone]       [address]  [facility] [address]        Thank you!     Thank you for choosing [facility]  for your care. Our goal is always to provide you with excellent care. Hearing back from our patients is one way we can continue to improve our services. Please take a few minutes to complete the written survey that you may receive in the mail after your visit with us. Thank you!             Your Updated Medication List - Protect others around you: Learn how to safely use, store and throw away your medicines at www.disposemymeds.org.          This list is accurate as of: 5/17/17  1:08 PM.  Always use your most recent med list.                   Brand Name Dispense Instructions  for use    albuterol 108 (90 BASE) MCG/ACT Inhaler    PROAIR HFA/PROVENTIL HFA/VENTOLIN HFA    1 Inhaler    Inhale 2 puffs into the lungs every 6 hours as needed for shortness of breath / dyspnea or wheezing       AZITHROMYCIN PO      Take 600 mg by mouth once a week       benzoyl peroxide 5 % Liqd     226 g    Use daily in shower       blood glucose lancets standard    no brand specified    100 Box    Use to test blood sugar four times daily or as directed.       blood glucose monitoring meter device kit    no brand specified    1 kit    Use to test blood sugar four times daily or as directed.       blood glucose monitoring test strip    no brand specified    100 each    Use to test blood sugars four times daily or as directed       dicyclomine 10 MG capsule    BENTYL    240 capsule    Take 1 capsule (10 mg) by mouth 4 times daily (before meals and nightly)       fluticasone 50 MCG/ACT spray    FLONASE    1 Bottle    Spray 1 spray into both nostrils daily       gabapentin 300 MG capsule    NEURONTIN    90 capsule    TAKE ONE CAPSULE BY MOUTH ON DAY 1, THEN TAKE ONE CAPSULE TWICE DAILY ON DAY 2 THEN TAKE ONE CAPSULE THREE TIMES A DAY.       GENVOYA [phone] MG Tabs per tablet   Generic drug:  Whnfawy-Sidwk-Gdpdrtcb-TenofAF     30 tablet    TAKE ONE TABLET BY MOUTH EVERY DAY       meclizine 25 MG tablet    ANTIVERT    20 tablet    Take 1 tablet (25 mg) by mouth 3 times daily as needed for dizziness       metFORMIN 500 MG tablet    GLUCOPHAGE    120 tablet    TAKE TWO TABLETS BY MOUTH TWICE A DAY WITH MEALS       minocycline 50 MG capsule    MINOCIN/DYNACIN    60 capsule    Take 1 capsule (50 mg) by mouth 2 times daily       montelukast 10 MG tablet    SINGULAIR    90 tablet    Take 1 tablet (10 mg) by mouth At Bedtime       saline 0.65 % (SOLN) Soln    AYR SALINE NASAL DROPS    1 Bottle    Spray 1-2 drops in nostril 2 times daily       sildenafil 50 MG cap/tab    VIAGRA    30 tablet    Take 0.5 tablets (25 mg)  by mouth daily as needed for erectile dysfunction       sulfamethoxazole-trimethoprim 800-160 MG per tablet    BACTRIM DS/SEPTRA DS    60 tablet    TAKE 1 TABLET BY MOUTH 2 TIMES DAILY

## 2017-05-17 NOTE — PATIENT INSTRUCTIONS
[facility] Medication Refill Request Information:  * Please contact your pharmacy regarding ANY request for medication refills.  ** [facility] Pres[facility] Fax = [phone]  * Please allow 3 business days for routine medication refills.  * Please allow 5 business days for controlled substance medication refills.     [facility] Test Result notification information:  *You will be notified with in 7-10 days of your appointment day regarding the results of your test.  If you are on MyChart you will be notified as soon as the provider has reviewed the results and signed off on them.    [facility] [phone]

## 2017-05-17 NOTE — NURSING NOTE
Chief Complaint   Patient presents with     Dizziness     patient states that he has had episodes of dizziness and nausea     TRISH THORNTON at 12:28 PM on 5/17/2017.

## 2017-05-23 ENCOUNTER — OFFICE VISIT (OUTPATIENT)
Dept: INTERNAL MEDICINE | Facility: CLINIC | Age: 52
End: 2017-05-23

## 2017-05-23 VITALS
WEIGHT: 178 LBS | SYSTOLIC BLOOD PRESSURE: 119 MMHG | RESPIRATION RATE: 20 BRPM | DIASTOLIC BLOOD PRESSURE: 82 MMHG | OXYGEN SATURATION: 98 % | BODY MASS INDEX: 29.62 KG/M2 | TEMPERATURE: 98.2 F | HEART RATE: 108 BPM

## 2017-05-23 DIAGNOSIS — E11.8 TYPE 2 DIABETES MELLITUS WITH COMPLICATION, WITHOUT LONG-TERM CURRENT USE OF INSULIN (H): Primary | ICD-10-CM

## 2017-05-23 DIAGNOSIS — Z13.29 SCREENING FOR HYPOTHYROIDISM: ICD-10-CM

## 2017-05-23 DIAGNOSIS — E78.5 HYPERLIPIDEMIA LDL GOAL <130: ICD-10-CM

## 2017-05-23 DIAGNOSIS — E11.8 TYPE 2 DIABETES MELLITUS WITH COMPLICATION, WITHOUT LONG-TERM CURRENT USE OF INSULIN (H): ICD-10-CM

## 2017-05-23 DIAGNOSIS — E11.9 TYPE 2 DIABETES MELLITUS WITHOUT COMPLICATION, WITHOUT LONG-TERM CURRENT USE OF INSULIN (H): ICD-10-CM

## 2017-05-23 DIAGNOSIS — Z01.00 EXAMINATION OF EYES AND VISION: ICD-10-CM

## 2017-05-23 LAB
CREAT UR-MCNC: 339 MG/DL
HBA1C MFR BLD: 6.2 % (ref 4.3–6)
MICROALBUMIN UR-MCNC: 26 MG/L
MICROALBUMIN/CREAT UR: 7.55 MG/G CR (ref 0–17)
TSH SERPL DL<=0.005 MIU/L-ACNC: 3.9 MU/L (ref 0.4–4)

## 2017-05-23 RX ORDER — ATORVASTATIN CALCIUM 40 MG/1
40 TABLET, FILM COATED ORAL DAILY
Qty: 30 TABLET | Refills: 3 | Status: CANCELLED | OUTPATIENT
Start: 2017-05-23

## 2017-05-23 ASSESSMENT — PAIN SCALES - GENERAL: PAINLEVEL: NO PAIN (0)

## 2017-05-23 NOTE — PROGRESS NOTES
[facility]   Primary Care Clinic      Resident Clinic Note        Visit Date: May 23, 2017    Andrew Lockwood  MRN: [id number]  YOB: 1967    Chief Complaint: diabetes follow up.      HPI:  Andrew Lockwood is a 49 year old male with history of HIV, well controlled with medications, type II DM, chronic abdominal pain who comes in for a health maintenance visit and diabetes follow up.      He has been compliant with his metformin.  Has run out of his testing strips and has lost his meter. His last A1c was 6.4. 5 months ago.  He otherwise has been feeling well recently.  No fever, chills.  Has been compliant with all of his medications.  Denies any other complaint.      Past medical, surgical, family history were reviewed in the chart.      ROS:  10 point ROS was reviewed and negative other than stated in HPI    Medications and Allergies were reviewed in epic.     Vitals:  /82 (BP Location: Right arm, Patient Position: Chair, Cuff Size: Adult Regular)  Pulse 108  Temp 98.2  F (36.8  C) (Oral)  Resp 20  Wt 80.7 kg (178 lb)  SpO2 98%  BMI 29.62 kg/m2    Vitals:    05/23/17 1111   Weight: 80.7 kg (178 lb)       Physical Exam:  General: NAD  HEENT: Oral mucosa moist and non-erythematous, PERRLA, EOM intact  CV: RRR, normal S1S2, no m/c/r  Resp: Clear to auscultation bilaterally, no wheezes or crackles  Abd: Soft, non-tender, BS+, no masses appreciated  Extremities: WWP, no pedal edema  Normal monofilament exam of bilateral feet.    Neuro: AAOx3, no lateralizing symptoms or focal neurologic deficits  Psych: Normal mood and affect.      Assessment:  Andrew Lockwood is a .49 year old with past medical history as listed above.      Plan:    Andrew was seen today for abdominal pain and glucose.    Diagnoses and all orders for this visit:    Type 2 diabetes mellitus: monofilament exam completed today.    -     Hemoglobin A1c  -     MICROALBUMIN    Screening for hypothyroidism  -     TSH  with free T4 reflex; Future    Type 2 diabetes mellitus without complication, without long-term current use of insulin (H)  -     blood glucose monitoring (NO BRAND SPECIFIED) meter device kit; Use to test blood sugar four times daily or as directed.  -     blood glucose monitoring (NO BRAND SPECIFIED) test strip; Use to test blood sugars four times daily or as directed  -     blood glucose (NO BRAND SPECIFIED) lancets standard; Use to test blood sugar four times daily or as directed.    Hyperlipidemia - Pt warrants treatment with a statin given his diabetes, smoking and ASCVD score.   - Pt agree able to start on a statin mediation.    - I will discus this with his HIV pharmacist as we need to ensure no interactions with his HIV medications prior to starting statins.      Examination - diabetes eye exam.    -     OPHTHALMOLOGY ADULT REFERRAL    Health Maintenance:  Blood pressure check: Within normal limits.     Immunizations: up to date.    Anal pap smear: Required on next visit - pt to schedule another appointment.      Follow-up:  In 1-2 months for continued evaluation of health maintenance.  Does require an anal pap smear per ID.      Patient seen and discussed with Dr. Silver.      Rubio Cronin MD,   Internal Medicine PGY-2  [phone]

## 2017-05-23 NOTE — PROGRESS NOTES
Pt was seen and examined with Dr. Cronin.  I agree with his documentation as noted above.    My additional comments: None    Julissa Silver MD

## 2017-05-23 NOTE — MR AVS SNAPSHOT
After Visit Summary   5/23/2017    Andrew Lockwood    MRN: [id number]           Patient Information     Date Of Birth          11/27/1967        Visit Information        Provider Department      5/23/2017 10:45 AM Rubio Cronin MD [facility]        Today's Diagnoses     Type 2 diabetes mellitus with complication, without long-term current use of insulin (H)    -  1    Screening for hypothyroidism        Type 2 diabetes mellitus without complication, without long-term current use of insulin (H)        Hyperlipidemia LDL goal <130        Examination of eyes and vision          Care Instructions    Primary Care Center Medication Refill Request Information:  * Please contact your pharmacy regarding ANY request for medication refills.  ** [facility] Prescription Fax = [phone]  * Please allow 3 business days for routine medication refills.  * Please allow 5 business days for controlled substance medication refills.     Primary Care Center Test Result notification information:  *You will be notified with in 7-10 days of your appointment day regarding the results of your test.  If you are on MyChart you will be notified as soon as the provider has reviewed the results and signed off on them.    Primary Care Center [phone]   Ophthalmology [phone] (4th Floor [facility])                 Follow-ups after your visit        Additional Services     OPHTHALMOLOGY ADULT REFERRAL       [facility] [place] [phone]   [email or web address]    Please be aware that coverage of these services is subject to the terms and limitations of your health insurance plan.  Call member services at your health plan with any benefit or coverage questions.      Please bring the following with you to your appointment:    (1) Any X-Rays, CTs or MRIs which have been performed.  Contact the facility where they were done to arrange for pick  up prior to your scheduled appointment.    (2) List of current medications  (3) This referral request   (4) Any documents/labs given to you for this referral                  Your next 10 appointments already scheduled     May 23, 2017 12:15 PM CDT   LAB with  LAB   [facility] ([facility])    [address] [place] [address]  [facility] [id number] [phone]           Patient must bring picture ID.  Patient should be prepared to give a urine specimen  Please do not eat 10-12 hours before your appointment if you are coming in fasting for labs on lipids, cholesterol, or glucose (sugar).  Pregnant women should follow their Care Team instructions. Water with medications is okay. Do not drink coffee or other fluids.   If you have concerns about taking  your medications, please ask at office or if scheduling via [employer]least, send a message by clicking on Secure Messaging, Message Your Care Team.            May 24, 2017  2:20 PM CDT   (Arrive by 2:05 PM)   RETURN GENERAL with Hernando White OD   [facility] Ophthal[facility] ([facility])    [address] [place] [address]  [facility] [id number] [phone]            Jul 06, 2017 10:15 AM CDT   (Arrive by 10:00 AM)   Return Visit with Carlee Hong MD   [facility] Dermatology ([facility])    [address] [place] [id number] [phone]              Future tests that were ordered for you today     Open Future Orders        Priority Expected Expires Ordered    Albumin Random Urine Quantitative - (Today) Routine 5/23/2017 5/23/2018 5/23/2017    TSH WITH FREE T4 REFLEX - (Today) Routine 5/23/2017 5/23/2018 5/23/2017    TSH with free T4 reflex Routine 5/23/2017 6/6/2017 5/23/2017            Who to contact     Please call your clinic at [phone] to:    Ask questions about your health    Make or cancel appointments    Discuss your medicines    Learn about your  test results    Speak to your doctor   If you have compliments or concerns about an experience at your clinic, or if you wish to file a complaint, please contact [facility] Patient Relations at [phone] or email us at [email or web address]         Additional Information About Your Visit        [employer] Information     [employer] gives you secure access to your electronic health record. If you see a primary care provider, you can also send messages to your care team and make appointments. If you have questions, please call your primary care clinic.  If you do not have a primary care provider, please call [phone] and they will assist you.      Instacart is an electronic gateway that provides easy, online access to your medical records. With Inst[employer], you can request a clinic appointment, read your test results, renew a prescription or communicate with your care team.     To access your existing account, please contact your [facility] or call [phone] for assistance.        Care EveryWhere ID     This is your Care EveryWhere ID. This could be used by other organizations to access your [place] medical records  [id number]        Your Vitals Were     Pulse Temperature Respirations Pulse Oximetry BMI (Body Mass Index)       108 98.2  F (36.8  C) (Oral) 20 98% 29.62 kg/m2        Blood Pressure from Last 3 Encounters:   05/23/17 119/82   05/17/17 117/82   05/13/17 (!) 135/91    Weight from Last 3 Encounters:   05/23/17 80.7 kg (178 lb)   05/17/17 82.1 kg (181 lb)   05/13/17 81.6 kg (180 lb)              We Performed the Following     Hemoglobin A1c     MICROALBUMIN     OPHTHALMOLOGY ADULT REFERRAL          Today's Medication Changes          These changes are accurate as of: 5/23/17 11:54 AM.  If you have any questions, ask your nurse or doctor.               These medicines have changed or have updated prescriptions.        Dose/Directions     GENVOYA [phone] MG Tabs per tablet   This may have changed:  See the new instructions.   Used for:  Human immunodeficiency virus (HIV) disease (H)   Generic drug:  Auwjumy-Xqgrr-Bgvscddy-TenofAF        TAKE ONE TABLET BY MOUTH EVERY DAY   Quantity:  30 tablet   Refills:  11            Where to get your medicines      Some of these will need a paper prescription and others can be bought over the counter.  Ask your nurse if you have questions.     Bring a paper prescription for each of these medications     blood glucose lancets standard    blood glucose monitoring meter device kit    blood glucose monitoring test strip                Primary Care Provider Office Phone # Fax #    Sharon Carrie Rosado [phone]       [address] [address]        Thank you!     Thank you for choosing [facility]  for your care. Our goal is always to provide you with excellent care. Hearing back from our patients is one way we can continue to improve our services. Please take a few minutes to complete the written survey that you may receive in the mail after your visit with us. Thank you!             Your Updated Medication List - Protect others around you: Learn how to safely use, store and throw away your medicines at www.disposemymeds.org.          This list is accurate as of: 5/23/17 11:54 AM.  Always use your most recent med list.                   Brand Name Dispense Instructions for use    albuterol 108 (90 BASE) MCG/ACT Inhaler    PROAIR HFA/PROVENTIL HFA/VENTOLIN HFA    1 Inhaler    Inhale 2 puffs into the lungs every 6 hours as needed for shortness of breath / dyspnea or wheezing       AZITHROMYCIN PO      Take 600 mg by mouth once a week       benzoyl peroxide 5 % Liqd     226 g    Use daily in shower       blood glucose lancets standard    no brand specified    100 Box    Use to test blood sugar four times daily or as directed.        blood glucose monitoring meter device kit    no brand specified    1 kit    Use to test blood sugar four times daily or as directed.       blood glucose monitoring test strip    no brand specified    100 each    Use to test blood sugars four times daily or as directed       cyclopentolate 1 % ophthalmic solution    CYCLOGYL    1 Bottle    Place 1 drop Into the left eye 2 times daily       dicyclomine 10 MG capsule    BENTYL    240 capsule    Take 1 capsule (10 mg) by mouth 4 times daily (before meals and nightly)       fluticasone 50 MCG/ACT spray    FLONASE    1 Bottle    Spray 1 spray into both nostrils daily       gabapentin 300 MG capsule    NEURONTIN    90 capsule    TAKE ONE CAPSULE BY MOUTH ON DAY 1, THEN TAKE ONE CAPSULE TWICE DAILY ON DAY 2 THEN TAKE ONE CAPSULE THREE TIMES A DAY.       GENVOYA [phone] MG Tabs per tablet   Generic drug:  Orrvqgx-Cnphh-Fchmharv-TenofAF     30 tablet    TAKE ONE TABLET BY MOUTH EVERY DAY       meclizine 25 MG tablet    ANTIVERT    20 tablet    Take 1 tablet (25 mg) by mouth 3 times daily as needed for dizziness       metFORMIN 500 MG tablet    GLUCOPHAGE    120 tablet    TAKE TWO TABLETS BY MOUTH TWICE A DAY WITH MEALS       minocycline 50 MG capsule    MINOCIN/DYNACIN    60 capsule    Take 1 capsule (50 mg) by mouth 2 times daily       montelukast 10 MG tablet    SINGULAIR    90 tablet    Take 1 tablet (10 mg) by mouth At Bedtime       prednisoLONE acetate 1 % ophthalmic susp    PRED FORTE    1 Bottle    Place 1 drop Into the left eye 4 times daily       saline 0.65 % (SOLN) Soln    AYR SALINE NASAL DROPS    1 Bottle    Spray 1-2 drops in nostril 2 times daily       sildenafil 50 MG cap/tab    VIAGRA    30 tablet    Take 0.5 tablets (25 mg) by mouth daily as needed for erectile dysfunction       sulfamethoxazole-trimethoprim 800-160 MG per tablet    BACTRIM DS/SEPTRA DS    60 tablet    TAKE 1 TABLET BY MOUTH 2 TIMES DAILY

## 2017-05-23 NOTE — NURSING NOTE
Chief Complaint   Patient presents with     Abdominal Pain     Here for abdominal cramps and bloating     Glucose     Also here for glucose issues and would like lipids checked.      Dante Wright CMA at 11:14 AM on 5/23/2017

## 2017-05-23 NOTE — PATIENT INSTRUCTIONS
Primary Care Center Medication Refill Request Information:  * Please contact your pharmacy regarding ANY request for medication refills.  ** [facility] Prescription Fax = [phone]  * Please allow 3 business days for routine medication refills.  * Please allow 5 business days for controlled substance medication refills.     Primary Care Center Test Result notification information:  *You will be notified with in 7-10 days of your appointment day regarding the results of your test.  If you are on MyChart you will be notified as soon as the provider has reviewed the results and signed off on them.    Primary Care Center [phone]   Ophthalmology [phone] (4th Floor [facility]

## 2017-05-26 ENCOUNTER — APPOINTMENT (OUTPATIENT)
Dept: GENERAL RADIOLOGY | Facility: CLINIC | Age: 52
End: 2017-05-26
Attending: INTERNAL MEDICINE
Payer: COMMERCIAL

## 2017-05-26 ENCOUNTER — HOSPITAL ENCOUNTER (EMERGENCY)
Facility: CLINIC | Age: 52
Discharge: HOME | End: 2017-05-26
Attending: INTERNAL MEDICINE | Admitting: INTERNAL MEDICINE
Payer: COMMERCIAL

## 2017-05-26 VITALS
HEIGHT: 65 IN | HEART RATE: 85 BPM | DIASTOLIC BLOOD PRESSURE: 73 MMHG | BODY MASS INDEX: 30.16 KG/M2 | WEIGHT: 181 LBS | RESPIRATION RATE: 16 BRPM | OXYGEN SATURATION: 100 % | TEMPERATURE: 98.6 F | SYSTOLIC BLOOD PRESSURE: 102 MMHG

## 2017-05-26 DIAGNOSIS — G89.29 CHRONIC ABDOMINAL PAIN: ICD-10-CM

## 2017-05-26 DIAGNOSIS — R10.9 CHRONIC ABDOMINAL PAIN: ICD-10-CM

## 2017-05-26 DIAGNOSIS — K59.01 SLOW TRANSIT CONSTIPATION: ICD-10-CM

## 2017-05-26 DIAGNOSIS — B20 HUMAN IMMUNODEFICIENCY VIRUS (HIV) DISEASE (H): ICD-10-CM

## 2017-05-26 LAB
ALBUMIN SERPL-MCNC: 3.8 G/DL (ref 3.4–5)
ALP SERPL-CCNC: 89 U/L (ref 40–150)
ALT SERPL W P-5'-P-CCNC: 18 U/L (ref 0–70)
ANION GAP SERPL CALCULATED.3IONS-SCNC: 8 MMOL/L (ref 3–14)
AST SERPL W P-5'-P-CCNC: 16 U/L (ref 0–45)
BASOPHILS # BLD AUTO: 0 10E9/L (ref 0–0.2)
BASOPHILS NFR BLD AUTO: 0.4 %
BILIRUB SERPL-MCNC: 0.5 MG/DL (ref 0.2–1.3)
BUN SERPL-MCNC: 11 MG/DL (ref 7–30)
CALCIUM SERPL-MCNC: 9.7 MG/DL (ref 8.5–10.1)
CHLORIDE SERPL-SCNC: 104 MMOL/L (ref 94–109)
CO2 SERPL-SCNC: 28 MMOL/L (ref 20–32)
CREAT SERPL-MCNC: 0.82 MG/DL (ref 0.66–1.25)
DIFFERENTIAL METHOD BLD: NORMAL
EOSINOPHIL # BLD AUTO: 0.4 10E9/L (ref 0–0.7)
EOSINOPHIL NFR BLD AUTO: 4.3 %
ERYTHROCYTE [DISTWIDTH] IN BLOOD BY AUTOMATED COUNT: 13.3 % (ref 10–15)
GFR SERPL CREATININE-BSD FRML MDRD: ABNORMAL ML/MIN/1.7M2
GLUCOSE SERPL-MCNC: 114 MG/DL (ref 70–99)
HCT VFR BLD AUTO: 41.6 % (ref 40–53)
HGB BLD-MCNC: 14.4 G/DL (ref 13.3–17.7)
IMM GRANULOCYTES # BLD: 0.1 10E9/L (ref 0–0.4)
IMM GRANULOCYTES NFR BLD: 0.6 %
INR PPP: 1 (ref 0.86–1.14)
LIPASE SERPL-CCNC: 200 U/L (ref 73–393)
LYMPHOCYTES # BLD AUTO: 2.1 10E9/L (ref 0.8–5.3)
LYMPHOCYTES NFR BLD AUTO: 25.4 %
MCH RBC QN AUTO: 32.1 PG (ref 26.5–33)
MCHC RBC AUTO-ENTMCNC: 34.6 G/DL (ref 31.5–36.5)
MCV RBC AUTO: 93 FL (ref 78–100)
MONOCYTES # BLD AUTO: 0.5 10E9/L (ref 0–1.3)
MONOCYTES NFR BLD AUTO: 5.6 %
NEUTROPHILS # BLD AUTO: 5.2 10E9/L (ref 1.6–8.3)
NEUTROPHILS NFR BLD AUTO: 63.7 %
NRBC # BLD AUTO: 0 10*3/UL
NRBC BLD AUTO-RTO: 0 /100
PLATELET # BLD AUTO: 250 10E9/L (ref 150–450)
POTASSIUM SERPL-SCNC: 3.5 MMOL/L (ref 3.4–5.3)
PROT SERPL-MCNC: 7.8 G/DL (ref 6.8–8.8)
RBC # BLD AUTO: 4.49 10E12/L (ref 4.4–5.9)
SODIUM SERPL-SCNC: 140 MMOL/L (ref 133–144)
WBC # BLD AUTO: 8.1 10E9/L (ref 4–11)

## 2017-05-26 PROCEDURE — 25000132 ZZH RX MED GY IP 250 OP 250 PS 637: Performed by: INTERNAL MEDICINE

## 2017-05-26 PROCEDURE — 85610 PROTHROMBIN TIME: CPT | Performed by: INTERNAL MEDICINE

## 2017-05-26 PROCEDURE — 99284 EMERGENCY DEPT VISIT MOD MDM: CPT | Mod: 25 | Performed by: INTERNAL MEDICINE

## 2017-05-26 PROCEDURE — 96360 HYDRATION IV INFUSION INIT: CPT | Performed by: INTERNAL MEDICINE

## 2017-05-26 PROCEDURE — 83690 ASSAY OF LIPASE: CPT | Performed by: INTERNAL MEDICINE

## 2017-05-26 PROCEDURE — 80053 COMPREHEN METABOLIC PANEL: CPT | Performed by: INTERNAL MEDICINE

## 2017-05-26 PROCEDURE — 85025 COMPLETE CBC W/AUTO DIFF WBC: CPT | Performed by: INTERNAL MEDICINE

## 2017-05-26 PROCEDURE — 99284 EMERGENCY DEPT VISIT MOD MDM: CPT | Mod: Z6 | Performed by: INTERNAL MEDICINE

## 2017-05-26 PROCEDURE — 74020 XR ABDOMEN 2 VW: CPT

## 2017-05-26 PROCEDURE — 25000128 H RX IP 250 OP 636: Performed by: INTERNAL MEDICINE

## 2017-05-26 RX ORDER — ALUMINA, MAGNESIA, AND SIMETHICONE 2400; 2400; 240 MG/30ML; MG/30ML; MG/30ML
30 SUSPENSION ORAL ONCE
Status: COMPLETED | OUTPATIENT
Start: 2017-05-26 | End: 2017-05-26

## 2017-05-26 RX ORDER — SIMETHICONE 125 MG
125 TABLET,CHEWABLE ORAL 4 TIMES DAILY PRN
Qty: 30 TABLET | Refills: 0 | Status: ON HOLD | OUTPATIENT
Start: 2017-05-26 | End: 2017-11-07

## 2017-05-26 RX ORDER — LIDOCAINE 40 MG/G
CREAM TOPICAL
Status: DISCONTINUED | OUTPATIENT
Start: 2017-05-26 | End: 2017-05-26 | Stop reason: HOSPADM

## 2017-05-26 RX ORDER — POLYETHYLENE GLYCOL 3350 17 G/17G
1 POWDER, FOR SOLUTION ORAL DAILY
Qty: 527 G | Refills: 0 | Status: SHIPPED | OUTPATIENT
Start: 2017-05-26 | End: 2017-06-19

## 2017-05-26 RX ADMIN — ALUMINUM HYDROXIDE, MAGNESIUM HYDROXIDE, AND DIMETHICONE 30 ML: 400; 400; 40 SUSPENSION ORAL at 16:22

## 2017-05-26 RX ADMIN — SODIUM CHLORIDE 1000 ML: 9 INJECTION, SOLUTION INTRAVENOUS at 16:22

## 2017-05-26 ASSESSMENT — ENCOUNTER SYMPTOMS
VOMITING: 1
SHORTNESS OF BREATH: 0
DIARRHEA: 0
COUGH: 0
FEVER: 0
ABDOMINAL PAIN: 1
NAUSEA: 1

## 2017-05-26 NOTE — ED AVS SNAPSHOT
[facility] [place], Emergency Department    [address] [place] [address]    Phone:  [phone]                                       Andrew Lockwood   MRN: [id number]    Department:  [facility], Emergency Department   Date of Visit:  5/26/2017           After Visit Summary Signature Page     I have received my discharge instructions, and my questions have been answered. I have discussed any challenges I see with this plan with the nurse or doctor.    ..........................................................................................................................................  Patient/Patient Representative Signature      ..........................................................................................................................................  Patient Representative Print Name and Relationship to Patient    ..................................................               ................................................  Date                                            Time    ..........................................................................................................................................  Reviewed by Signature/Title    ...................................................              ..............................................  Date                                                            Time

## 2017-05-26 NOTE — ED AVS SNAPSHOT
[facility], Emergency Department    [address] [facility] [address]    Phone:  [phone]                                       Andrew Lockwood   MRN: [id number]    Department:  [facility], Emergency Department   Date of Visit:  5/26/2017           Patient Information     Date Of Birth          11/27/1967        Your diagnoses for this visit were:     Chronic abdominal pain     Slow transit constipation     Human immunodeficiency virus (HIV) disease (H)        You were seen by Zaire Oropeza MD.        Discharge Instructions         * Abdominal Pain,Uncertain Cause [Male]  Based on your visit today, the exact cause of your abdominal (stomach) pain is not clear. Your exam and tests do not indicate a dangerous cause at this time. However, the signs of a serious problem may take more time to appear. Although your exam was reassuring today, sometimes early in the course of many conditions, exam and lab tests can appear normal. Therefore, it is important for you to watch for any new symptoms or worsening of your condition.  Causes:  It may not be obvious what caused your symptoms. Pay attention to things that do seem to make your symptoms worse or better and discuss this with your doctor when you follow up.  Diagnosis:  The evaluation of abdominal pain in the emergency department may only require an exam by the doctor or it may include blood, urine or imaging studies, depending on many factors. Sometimes exams and tests can identify a cause but in many cases, a clear cause is not found. Further testing at follow up visits may help to suggest a clear diagnosis.  Home Care:    Rest as much as possible until your next exam.    Try to avoid any medications (unless otherwise directed by your doctor), foods, activities, or other factors that you may have contributed to your symptoms.    Try to eat foods that you know that you have tolerated well in the past. Certain diets may be recommended for some conditions  that cause abdominal pain. However, since the cause of your symptoms may not be clear, discuss your diet more with your primary care provider or specialist for further recommendations.     Eating several small meals per day as opposed to 2 or 3 larger meals may help.    Monitor closely for anything that may make your symptoms worse or better. Pay close attention to symptoms below that may indicate worsening of your condition.  Follow Up And Precautions:  See your doctor or this facility as instructed (or sooner, if your symptoms are not improving). In some cases, you may need more testing.  Contact Your Doctor Or Seek Medical Attention  if any of the following occur:    Pain is becoming worse    You are unable to take your medications because of too much vomiting    Swelling of the abdomen    Fever of 101 F (38.3 C) or higher, or as directed by your health care provider    Blood in vomit or bowel movements (dark red or black color)    Jaundice (yellow color of eyes and skin)    New onset of weakness, dizziness or fainting    New onset of chest, arm, back, neck or jaw pain    9619[address] [place] [address]. All rights reserved. This information is not intended as a substitute for professional medical care. Always follow your healthcare professional's instructions.      Please make an appointment to follow up with Your Primary Care Provider as soon as possible even if entirely better.     Future Appointments        Provider Department Dept Phone Center    7/6/2017 10:15 AM Carlee Hong MD [facility] Dermatology [phone] [facility]      24 Hour Appointment Hotline       To make an appointment at any [facility], call [phone] ([phone]). If you don't have a family doctor or clinic, we will help you find one. [facility] are conveniently located to serve the needs of you and your family.             Review of your medicines      START taking        Dose /  Directions Last dose taken    polyethylene glycol powder   Commonly known as:  MIRALAX   Dose:  1 capful   Quantity:  527 g        Take 17 g (1 capful) by mouth daily   Refills:  0        simethicone 125 MG Chew chewable tablet   Commonly known as:  MYLICON   Dose:  125 mg   Quantity:  30 tablet        Take 1 tablet (125 mg) by mouth 4 times daily as needed for intestinal gas   Refills:  0          Our records show that you are taking the medicines listed below. If these are incorrect, please call your family doctor or clinic.        Dose / Directions Last dose taken    albuterol 108 (90 BASE) MCG/ACT Inhaler   Commonly known as:  PROAIR HFA/PROVENTIL HFA/VENTOLIN HFA   Dose:  2 puff   Quantity:  1 Inhaler        Inhale 2 puffs into the lungs every 6 hours as needed for shortness of breath / dyspnea or wheezing   Refills:  0        AZITHROMYCIN PO   Dose:  600 mg        Take 600 mg by mouth once a week   Refills:  0        benzoyl peroxide 5 % Liqd   Quantity:  226 g        Use daily in shower   Refills:  11        blood glucose lancets standard   Commonly known as:  no brand specified   Quantity:  100 Box        Use to test blood sugar four times daily or as directed.   Refills:  5        blood glucose monitoring meter device kit   Commonly known as:  no brand specified   Quantity:  1 kit        Use to test blood sugar four times daily or as directed.   Refills:  0        blood glucose monitoring test strip   Commonly known as:  no brand specified   Quantity:  100 each        Use to test blood sugars four times daily or as directed   Refills:  5        cyclopentolate 1 % ophthalmic solution   Commonly known as:  CYCLOGYL   Dose:  1 drop   Quantity:  1 Bottle        Place 1 drop Into the left eye 2 times daily   Refills:  0        dicyclomine 10 MG capsule   Commonly known as:  BENTYL   Dose:  10 mg   Quantity:  240 capsule        Take 1 capsule (10 mg) by mouth 4 times daily (before meals and nightly)   Refills:  1         fluticasone 50 MCG/ACT spray   Commonly known as:  FLONASE   Dose:  1 spray   Quantity:  1 Bottle        Spray 1 spray into both nostrils daily   Refills:  1        gabapentin 300 MG capsule   Commonly known as:  NEURONTIN   Quantity:  90 capsule        TAKE ONE CAPSULE BY MOUTH ON DAY 1, THEN TAKE ONE CAPSULE TWICE DAILY ON DAY 2 THEN TAKE ONE CAPSULE THREE TIMES A DAY.   Refills:  3        GENVOYA [phone] MG Tabs per tablet   Quantity:  30 tablet   Generic drug:  Okeyhgt-Zllxz-Xzytqkte-TenofAF        TAKE ONE TABLET BY MOUTH EVERY DAY   Refills:  11        meclizine 25 MG tablet   Commonly known as:  ANTIVERT   Dose:  25 mg   Quantity:  20 tablet        Take 1 tablet (25 mg) by mouth 3 times daily as needed for dizziness   Refills:  1        metFORMIN 500 MG tablet   Commonly known as:  GLUCOPHAGE   Quantity:  120 tablet        TAKE TWO TABLETS BY MOUTH TWICE A DAY WITH MEALS   Refills:  3        minocycline 50 MG capsule   Commonly known as:  MINOCIN/DYNACIN   Dose:  50 mg   Quantity:  60 capsule        Take 1 capsule (50 mg) by mouth 2 times daily   Refills:  0        montelukast 10 MG tablet   Commonly known as:  SINGULAIR   Dose:  10 mg   Quantity:  90 tablet        Take 1 tablet (10 mg) by mouth At Bedtime   Refills:  3        prednisoLONE acetate 1 % ophthalmic susp   Commonly known as:  PRED FORTE   Dose:  1 drop   Quantity:  1 Bottle        Place 1 drop Into the left eye 4 times daily   Refills:  0        saline 0.65 % (SOLN) Soln   Commonly known as:  AYR SALINE NASAL DROPS   Dose:  1-2 drop   Quantity:  1 Bottle        Spray 1-2 drops in nostril 2 times daily   Refills:  0        sildenafil 50 MG cap/tab   Commonly known as:  VIAGRA   Dose:  25 mg   Quantity:  30 tablet        Take 0.5 tablets (25 mg) by mouth daily as needed for erectile dysfunction   Refills:  0        sulfamethoxazole-trimethoprim 800-160 MG per tablet   Commonly known as:  BACTRIM DS/SEPTRA DS   Quantity:  60 tablet         TAKE 1 TABLET BY MOUTH 2 TIMES DAILY   Refills:  3                Prescriptions were sent or printed at these locations (2 Prescriptions)                   Other Prescriptions                Printed at Department/Unit printer (2 of 2)         polyethylene glycol (MIRALAX) powder               simethicone (MYLICON) 125 MG CHEW chewable tablet                Procedures and tests performed during your visit     CBC with platelets differential    Comprehensive metabolic panel    INR    Lipase    Peripheral IV: Standard    XR Abdomen 2 Views      Orders Needing Specimen Collection     Ordered          05/26/17 1441  UA reflex to Microscopic and Culture - STAT, [facility]: STAT, Needs to be Collected     Scheduled Task Status   05/26/17 1441 Collect UA reflex to Microscopic and Culture Open   Order Class:  PCU Collect                05/26/17 1441  Drug abuse screen 6 urine (chem dep) - ROUTINE, [facility]: Routine, Needs to be Collected     Scheduled Task Status   05/26/17 1441 Collect Drug abuse screen 6 urine (chem dep) Open   Order Class:  PCU Collect                  Pending Results     No orders found from 5/24/2017 to 5/27/2017.            Pending Culture Results     No orders found from 5/24/2017 to 5/27/2017.            Pending Results Instructions     If you had any lab results that were not finalized at the time of your Discharge, you can call the ED Lab Result RN at [phone]. You will be contacted by this team for any positive Lab results or changes in treatment. The nurses are available 7 days a week from 10A to 6:30P.  You can leave a message 24 hours per day and they will return your call.        Thank you for choosing [facility]       Thank you for choosing [facility] for your care. Our goal is always to provide you with excellent care. Hearing back from our patients is one way we can continue to improve our services. Please take a few minutes to complete the written survey that you may receive in the mail after you  visit with us. Thank you!        [employer] Information     [employer] gives you secure access to your electronic health record. If you see a primary care provider, you can also send messages to your care team and make appointments. If you have questions, please call your primary care clinic.  If you do not have a primary care provider, please call [phone] and they will assist you.        Care EveryWhere ID     This is your Care EveryWhere ID. This could be used by other organizations to access your [place] medical records  [id number]        After Visit Summary       This is your record. Keep this with you and show to your community pharmacist(s) and doctor(s) at your next visit.

## 2017-05-26 NOTE — DISCHARGE INSTRUCTIONS
* Abdominal Pain,Uncertain Cause [Male]  Based on your visit today, the exact cause of your abdominal (stomach) pain is not clear. Your exam and tests do not indicate a dangerous cause at this time. However, the signs of a serious problem may take more time to appear. Although your exam was reassuring today, sometimes early in the course of many conditions, exam and lab tests can appear normal. Therefore, it is important for you to watch for any new symptoms or worsening of your condition.  Causes:  It may not be obvious what caused your symptoms. Pay attention to things that do seem to make your symptoms worse or better and discuss this with your doctor when you follow up.  Diagnosis:  The evaluation of abdominal pain in the emergency department may only require an exam by the doctor or it may include blood, urine or imaging studies, depending on many factors. Sometimes exams and tests can identify a cause but in many cases, a clear cause is not found. Further testing at follow up visits may help to suggest a clear diagnosis.  Home Care:    Rest as much as possible until your next exam.    Try to avoid any medications (unless otherwise directed by your doctor), foods, activities, or other factors that you may have contributed to your symptoms.    Try to eat foods that you know that you have tolerated well in the past. Certain diets may be recommended for some conditions that cause abdominal pain. However, since the cause of your symptoms may not be clear, discuss your diet more with your primary care provider or specialist for further recommendations.     Eating several small meals per day as opposed to 2 or 3 larger meals may help.    Monitor closely for anything that may make your symptoms worse or better. Pay close attention to symptoms below that may indicate worsening of your condition.  Follow Up And Precautions:  See your doctor or this facility as instructed (or sooner, if your symptoms are not  improving). In some cases, you may need more testing.  Contact Your Doctor Or Seek Medical Attention  if any of the following occur:    Pain is becoming worse    You are unable to take your medications because of too much vomiting    Swelling of the abdomen    Fever of 101 F (38.3 C) or higher, or as directed by your health care provider    Blood in vomit or bowel movements (dark red or black color)    Jaundice (yellow color of eyes and skin)    New onset of weakness, dizziness or fainting    New onset of chest, arm, back, neck or jaw pain    [id number] [facility], [address] [place] [address] [id number]. All rights reserved. This information is not intended as a substitute for professional medical care. Always follow your healthcare professional's instructions.      Please make an appointment to follow up with Your Primary Care Provider as soon as possible even if entirely better.

## 2017-05-26 NOTE — ED PROVIDER NOTES
History     Chief Complaint   Patient presents with     Abdominal Pain     Nausea & Vomiting     HPI  Andrew Lockwood is a 49 year old male with a history of HIV/AIDS, CNS toxoplasmosis, DM type 2, and chronic abdominal pain who presents for evaluation of abdominal pain, nausea, and vomiting. The patient reports onset of these symptoms this morning. He describes diffuse upper abdominal pain, worst in the left upper quadrant. He endorses nausea and one episode of vomiting. The patient denies any constipation or diarrhea, with last bowel movement earlier today being normal. He denies any fevers. The patient denies any recreational drug use.    I have reviewed the Medications, Allergies, Past Medical and Surgical History, and Social History in the Epic system.    Current Facility-Administered Medications   Medication     lidocaine 1 % 1 mL     lidocaine (LMX4) kit     sodium chloride (PF) 0.9% PF flush 3 mL     sodium chloride (PF) 0.9% PF flush 3 mL     Current Outpatient Prescriptions   Medication     polyethylene glycol (MIRALAX) powder     simethicone (MYLICON) 125 MG CHEW chewable tablet     GENVOYA [phone] MG PO TABS     blood glucose monitoring (NO BRAND SPECIFIED) meter device kit     blood glucose monitoring (NO BRAND SPECIFIED) test strip     blood glucose (NO BRAND SPECIFIED) lancets standard     meclizine (ANTIVERT) 25 MG tablet     cyclopentolate (CYCLOGYL) 1 % ophthalmic solution     prednisoLONE acetate (PRED FORTE) 1 % ophthalmic susp     saline (AYR SALINE NASAL DROPS) 0.65 % (SOLN) SOLN     minocycline (MINOCIN/DYNACIN) 50 MG capsule     benzoyl peroxide 5 % LIQD     dicyclomine (BENTYL) 10 MG capsule     montelukast (SINGULAIR) 10 MG tablet     fluticasone (FLONASE) 50 MCG/ACT spray     albuterol (PROAIR HFA/PROVENTIL HFA/VENTOLIN HFA) 108 (90 BASE) MCG/ACT Inhaler     sulfamethoxazole-trimethoprim (BACTRIM DS/SEPTRA DS) 800-160 MG per tablet     sildenafil (VIAGRA) 50 MG cap/tab      gabapentin (NEURONTIN) 300 MG capsule     metFORMIN (GLUCOPHAGE) 500 MG tablet     AZITHROMYCIN PO     Past Medical History:   Diagnosis Date     AIDS (H)      Allergic rhinitis due to other allergen     DNS     Chronic abdominal pain      CNS toxoplasmosis (H)      Diabetes type 2, controlled (H)      GERD (gastroesophageal reflux disease)      HIV (human immunodeficiency virus infection) (H)      Periungual wart      Sleep apnea     doesn't use CPAP       Past Surgical History:   Procedure Laterality Date     C NONSPECIFIC PROCEDURE      right forearm fracture     COLONOSCOPY Left 1/22/2016    Procedure: COMBINED COLONOSCOPY, SINGLE OR MULTIPLE BIOPSY/POLYPECTOMY BY BIOPSY;  Surgeon: Clark Saini MD;  Location: [facility]     HC EXPLORE UNDESC TESTIS,INGUIN/SCROTAL       LAPAROSCOPY DIAGNOSTIC (GENERAL) N/A 7/26/2016    Procedure: LAPAROSCOPY DIAGNOSTIC (GENERAL);  Surgeon: Susannah Arriaga MD;  Location: [facility] OR     OPTICAL TRACKING SYSTEM CRANIOTOMY, EXCISE TUMOR, COMBINED Left 4/10/2015    Procedure: COMBINED OPTICAL TRACKING SYSTEM CRANIOTOMY, EXCISE TUMOR;  Surgeon: Mirlande Colmenares MD;  Location: [facility] OR     REPAIR GAMEKEEPER'S THUMB Right 12/2/2016    Procedure: REPAIR LIGAMENT ULNAR COLLATERAL THUMB (GAMEKEEPER'S);  Surgeon: Evin Zamorano MD;  Location: [facility]       Family History   Problem Relation Age of Onset     DIABETES Brother      DIABETES Father      Alzheimer Disease Father      Unknown/Adopted Mother      DIABETES Paternal Grandfather      CANCER No family hx of      no skin cancer     Skin Cancer No family hx of      no famiy hx of skin cancer       Social History   Substance Use Topics     Smoking status: Current Every Day Smoker     Packs/day: 0.25     Last attempt to quit: 10/28/2016     Smokeless tobacco: Former User      Comment: just smoked very rarely/socailly in the past     Alcohol use No      Comment: Last etoh in 2007        Allergies   Allergen Reactions     Milk [Lac  "Bovis] Hives     Tylenol [Acetaminophen] Itching       Review of Systems   Constitutional: Negative for fever.   Respiratory: Negative for cough and shortness of breath.    Cardiovascular: Negative for chest pain.   Gastrointestinal: Positive for abdominal pain, nausea and vomiting. Negative for diarrhea.   All other systems reviewed and are negative.      Physical Exam   BP: (!) 133/92  Pulse: 95  Temp: 98.6  F (37  C)  Resp: 16  Height: 165.1 cm (5' 5\")  Weight: 82.1 kg (181 lb)  SpO2: 100 %  Physical Exam   Constitutional: He is oriented to person, place, and time. No distress.   HENT:   Head: Atraumatic.   Mouth/Throat: Oropharynx is clear and moist. No oropharyngeal exudate.   Eyes: Pupils are equal, round, and reactive to light. No scleral icterus.   Neck: Neck supple. No JVD present.   Cardiovascular: Normal rate, normal heart sounds and intact distal pulses.  Exam reveals no gallop and no friction rub.    No murmur heard.  Pulmonary/Chest: Effort normal and breath sounds normal. No respiratory distress. He has no wheezes. He has no rales. He exhibits no tenderness.   Abdominal: Soft. Bowel sounds are normal. He exhibits no distension and no mass. There is no tenderness. There is no rebound and no guarding.   Musculoskeletal: He exhibits no edema or tenderness.   Neurological: He is alert and oriented to person, place, and time. No cranial nerve deficit. Coordination normal.   Skin: Skin is warm. No rash noted. He is not diaphoretic.       ED Course     ED Course     Procedures        Results for orders placed or performed during the hospital encounter of 05/26/17 (from the past 24 hour(s))   CBC with platelets differential     Status: None    Collection Time: 05/26/17  3:01 PM   Result Value Ref Range    WBC 8.1 4.0 - 11.0 10e9/L    RBC Count 4.49 4.4 - 5.9 10e12/L    Hemoglobin 14.4 13.3 - 17.7 g/dL    Hematocrit 41.6 40.0 - 53.0 %    MCV 93 78 - 100 fl    MCH 32.1 26.5 - 33.0 pg    MCHC 34.6 31.5 - 36.5 " g/dL    RDW 13.3 10.0 - 15.0 %    Platelet Count 250 150 - 450 10e9/L    Diff Method Automated Method     % Neutrophils 63.7 %    % Lymphocytes 25.4 %    % Monocytes 5.6 %    % Eosinophils 4.3 %    % Basophils 0.4 %    % Immature Granulocytes 0.6 %    Nucleated RBCs 0 0 /100    Absolute Neutrophil 5.2 1.6 - 8.3 10e9/L    Absolute Lymphocytes 2.1 0.8 - 5.3 10e9/L    Absolute Monocytes 0.5 0.0 - 1.3 10e9/L    Absolute Eosinophils 0.4 0.0 - 0.7 10e9/L    Absolute Basophils 0.0 0.0 - 0.2 10e9/L    Abs Immature Granulocytes 0.1 0 - 0.4 10e9/L    Absolute Nucleated RBC 0.0    INR     Status: None    Collection Time: 05/26/17  3:01 PM   Result Value Ref Range    INR 1.00 0.86 - 1.14   Comprehensive metabolic panel     Status: Abnormal    Collection Time: 05/26/17  3:01 PM   Result Value Ref Range    Sodium 140 133 - 144 mmol/L    Potassium 3.5 3.4 - 5.3 mmol/L    Chloride 104 94 - 109 mmol/L    Carbon Dioxide 28 20 - 32 mmol/L    Anion Gap 8 3 - 14 mmol/L    Glucose 114 (H) 70 - 99 mg/dL    Urea Nitrogen 11 7 - 30 mg/dL    Creatinine 0.82 0.66 - 1.25 mg/dL    GFR Estimate >90  Non  GFR Calc   >60 mL/min/1.7m2    GFR Estimate If Black >90   GFR Calc   >60 mL/min/1.7m2    Calcium 9.7 8.5 - 10.1 mg/dL    Bilirubin Total 0.5 0.2 - 1.3 mg/dL    Albumin 3.8 3.4 - 5.0 g/dL    Protein Total 7.8 6.8 - 8.8 g/dL    Alkaline Phosphatase 89 40 - 150 U/L    ALT 18 0 - 70 U/L    AST 16 0 - 45 U/L   Lipase     Status: None    Collection Time: 05/26/17  3:01 PM   Result Value Ref Range    Lipase 200 73 - 393 U/L   XR Abdomen 2 Views     Status: None    Collection Time: 05/26/17  3:19 PM    Narrative    XR ABDOMEN 2 VW  5/26/2017 3:19 PM      HISTORY: pain    COMPARISON: 2/24/2017    FINDINGS: Supine, upright, and right lateral decubitus views of the  abdomen. Moderate amount of stool in the colon. Paucity of bowel gas.  No intra-abdominal free air. No pneumatosis. No portal venous gas.    Radiopaque material  projecting over the lower pelvis, correlating to  the penile prosthesis. Penile prosthesis reservoir projecting over the  low pelvis.      Impression    IMPRESSION: Paucity of bowel gas. Moderate amount of stool in the  colon.    I have personally reviewed the examination and initial interpretation  and I agree with the findings.    DRE SHEEHAN           Labs Ordered and Resulted from Time of ED Arrival Up to the Time of Departure from the ED   COMPREHENSIVE METABOLIC PANEL - Abnormal; Notable for the following:        Result Value    Glucose 114 (*)     All other components within normal limits   CBC WITH PLATELETS DIFFERENTIAL   INR   LIPASE   UA MACROSCOPIC WITH REFLEX TO MICRO AND CULTURE   DRUG ABUSE SCREEN 6 CHEM DEP URINE ([facility])   PERIPHERAL IV CATHETER            Assessments & Plan (with Medical Decision Making)  Chronic abd pain exacerbation, labs all good again and XR without obstructive pattern, but moderate stools, sleeping comfortably, will DC with miralax and simethicone prn, no fever or diarrhea in this Pt with HIV, follow up with his PMD.       I have reviewed the nursing notes.    I have reviewed the findings, diagnosis, plan and need for follow up with the patient.    Discharge Medication List as of 5/26/2017  5:19 PM      START taking these medications    Details   polyethylene glycol (MIRALAX) powder Take 17 g (1 capful) by mouth daily, Disp-527 g, R-0, Local Print      simethicone (MYLICON) 125 MG CHEW chewable tablet Take 1 tablet (125 mg) by mouth 4 times daily as needed for intestinal gas, Disp-30 tablet, R-0, Local Print             Final diagnoses:   Chronic abdominal pain   Slow transit constipation   Human immunodeficiency virus (HIV) disease (H)     I, Miky Lima, am serving as a trained medical scribe to document services personally performed by Zaire Oropeza MD, based on the provider's statements to me.      I, Zaire Oropeza MD, was physically present and have reviewed and  verified the accuracy of this note documented by Miky Lima.    5/26/2017   [facility], [place], EMERGENCY DEPARTMENT     Zaire Oropeza MD  05/26/17 [id number]

## 2017-06-05 DIAGNOSIS — R10.13 EPIGASTRIC PAIN: ICD-10-CM

## 2017-06-08 DIAGNOSIS — E11.9 DM (DIABETES MELLITUS) (H): ICD-10-CM

## 2017-06-10 ENCOUNTER — HOSPITAL ENCOUNTER (EMERGENCY)
Facility: CLINIC | Age: 52
Discharge: LEFT BEFORE DISCHARGE | End: 2017-06-11
Admitting: EMERGENCY MEDICINE
Payer: COMMERCIAL

## 2017-06-10 VITALS
BODY MASS INDEX: 29.32 KG/M2 | SYSTOLIC BLOOD PRESSURE: 130 MMHG | HEART RATE: 78 BPM | HEIGHT: 65 IN | DIASTOLIC BLOOD PRESSURE: 82 MMHG | TEMPERATURE: 97.9 F | OXYGEN SATURATION: 99 % | WEIGHT: 176 LBS | RESPIRATION RATE: 18 BRPM

## 2017-06-10 DIAGNOSIS — R10.9 STOMACH ACHE: ICD-10-CM

## 2017-06-10 LAB
ALBUMIN SERPL-MCNC: 3.5 G/DL (ref 3.4–5)
ALP SERPL-CCNC: 80 U/L (ref 40–150)
ALT SERPL W P-5'-P-CCNC: 22 U/L (ref 0–70)
ANION GAP SERPL CALCULATED.3IONS-SCNC: 9 MMOL/L (ref 3–14)
AST SERPL W P-5'-P-CCNC: 15 U/L (ref 0–45)
BASOPHILS # BLD AUTO: 0 10E9/L (ref 0–0.2)
BASOPHILS NFR BLD AUTO: 0.3 %
BILIRUB SERPL-MCNC: 0.6 MG/DL (ref 0.2–1.3)
BUN SERPL-MCNC: 10 MG/DL (ref 7–30)
CALCIUM SERPL-MCNC: 10.4 MG/DL (ref 8.5–10.1)
CHLORIDE SERPL-SCNC: 106 MMOL/L (ref 94–109)
CO2 SERPL-SCNC: 24 MMOL/L (ref 20–32)
CREAT SERPL-MCNC: 0.74 MG/DL (ref 0.66–1.25)
DIFFERENTIAL METHOD BLD: ABNORMAL
EOSINOPHIL # BLD AUTO: 0.4 10E9/L (ref 0–0.7)
EOSINOPHIL NFR BLD AUTO: 4.5 %
ERYTHROCYTE [DISTWIDTH] IN BLOOD BY AUTOMATED COUNT: 13.2 % (ref 10–15)
GFR SERPL CREATININE-BSD FRML MDRD: ABNORMAL ML/MIN/1.7M2
GLUCOSE SERPL-MCNC: 128 MG/DL (ref 70–99)
HCT VFR BLD AUTO: 41 % (ref 40–53)
HGB BLD-MCNC: 13.7 G/DL (ref 13.3–17.7)
IMM GRANULOCYTES # BLD: 0 10E9/L (ref 0–0.4)
IMM GRANULOCYTES NFR BLD: 0.5 %
LIPASE SERPL-CCNC: 178 U/L (ref 73–393)
LYMPHOCYTES # BLD AUTO: 2.5 10E9/L (ref 0.8–5.3)
LYMPHOCYTES NFR BLD AUTO: 31.1 %
MCH RBC QN AUTO: 31.8 PG (ref 26.5–33)
MCHC RBC AUTO-ENTMCNC: 33.4 G/DL (ref 31.5–36.5)
MCV RBC AUTO: 95 FL (ref 78–100)
MONOCYTES # BLD AUTO: 0.5 10E9/L (ref 0–1.3)
MONOCYTES NFR BLD AUTO: 6.6 %
NEUTROPHILS # BLD AUTO: 4.6 10E9/L (ref 1.6–8.3)
NEUTROPHILS NFR BLD AUTO: 57 %
NRBC # BLD AUTO: 0 10*3/UL
NRBC BLD AUTO-RTO: 0 /100
PLATELET # BLD AUTO: 259 10E9/L (ref 150–450)
POTASSIUM SERPL-SCNC: 3.5 MMOL/L (ref 3.4–5.3)
PROT SERPL-MCNC: 7.2 G/DL (ref 6.8–8.8)
RBC # BLD AUTO: 4.31 10E12/L (ref 4.4–5.9)
SODIUM SERPL-SCNC: 139 MMOL/L (ref 133–144)
WBC # BLD AUTO: 8 10E9/L (ref 4–11)

## 2017-06-10 PROCEDURE — 85025 COMPLETE CBC W/AUTO DIFF WBC: CPT | Performed by: EMERGENCY MEDICINE

## 2017-06-10 PROCEDURE — 40000268 ZZH STATISTIC NO CHARGES: Performed by: EMERGENCY MEDICINE

## 2017-06-10 PROCEDURE — 83690 ASSAY OF LIPASE: CPT | Performed by: EMERGENCY MEDICINE

## 2017-06-10 PROCEDURE — 80053 COMPREHEN METABOLIC PANEL: CPT | Performed by: EMERGENCY MEDICINE

## 2017-06-19 DIAGNOSIS — K59.00 CONSTIPATION: Primary | ICD-10-CM

## 2017-06-19 RX ORDER — POLYETHYLENE GLYCOL 3350 17 G/17G
1 POWDER, FOR SOLUTION ORAL DAILY
Qty: 527 G | Refills: 0 | Status: ON HOLD | OUTPATIENT
Start: 2017-06-19 | End: 2017-11-07

## 2017-06-19 NOTE — TELEPHONE ENCOUNTER
Per Dr Rosado via face to face conversation in clinic, OK to put in order for Julia lax powder. Pt changing to new pharmacy.  Dalila Wilosn RN

## 2017-06-20 DIAGNOSIS — B02.9 HERPES ZOSTER WITHOUT COMPLICATION: ICD-10-CM

## 2017-06-20 DIAGNOSIS — R10.13 EPIGASTRIC PAIN: ICD-10-CM

## 2017-06-22 ENCOUNTER — OFFICE VISIT (OUTPATIENT)
Dept: PSYCHOLOGY | Facility: CLINIC | Age: 52
End: 2017-06-22
Payer: COMMERCIAL

## 2017-06-22 DIAGNOSIS — F41.0 PANIC DISORDER: Primary | ICD-10-CM

## 2017-06-22 DIAGNOSIS — F41.1 GENERALIZED ANXIETY DISORDER: ICD-10-CM

## 2017-06-22 DIAGNOSIS — F32.9 MAJOR DEPRESSIVE DISORDER, SINGLE EPISODE, UNSPECIFIED: ICD-10-CM

## 2017-06-22 DIAGNOSIS — B20 HUMAN IMMUNODEFICIENCY VIRUS (HIV) DISEASE (H): ICD-10-CM

## 2017-06-22 DIAGNOSIS — R10.9 ABDOMINAL CRAMPING: ICD-10-CM

## 2017-06-22 PROCEDURE — 90834 PSYTX W PT 45 MINUTES: CPT | Performed by: MARRIAGE & FAMILY THERAPIST

## 2017-06-22 RX ORDER — GABAPENTIN 300 MG/1
300 CAPSULE ORAL 3 TIMES DAILY
Qty: 90 CAPSULE | Refills: 5 | Status: SHIPPED | OUTPATIENT
Start: 2017-06-22 | End: 2018-02-16

## 2017-06-22 RX ORDER — DICYCLOMINE HYDROCHLORIDE 10 MG/1
10 CAPSULE ORAL
Qty: 240 CAPSULE | Refills: 1 | Status: SHIPPED | OUTPATIENT
Start: 2017-06-22 | End: 2017-07-10

## 2017-06-22 ASSESSMENT — PATIENT HEALTH QUESTIONNAIRE - PHQ9: 5. POOR APPETITE OR OVEREATING: MORE THAN HALF THE DAYS

## 2017-06-22 ASSESSMENT — ANXIETY QUESTIONNAIRES
6. BECOMING EASILY ANNOYED OR IRRITABLE: MORE THAN HALF THE DAYS
5. BEING SO RESTLESS THAT IT IS HARD TO SIT STILL: MORE THAN HALF THE DAYS
3. WORRYING TOO MUCH ABOUT DIFFERENT THINGS: MORE THAN HALF THE DAYS
7. FEELING AFRAID AS IF SOMETHING AWFUL MIGHT HAPPEN: NEARLY EVERY DAY
2. NOT BEING ABLE TO STOP OR CONTROL WORRYING: NEARLY EVERY DAY
1. FEELING NERVOUS, ANXIOUS, OR ON EDGE: NEARLY EVERY DAY
IF YOU CHECKED OFF ANY PROBLEMS ON THIS QUESTIONNAIRE, HOW DIFFICULT HAVE THESE PROBLEMS MADE IT FOR YOU TO DO YOUR WORK, TAKE CARE OF THINGS AT HOME, OR GET ALONG WITH OTHER PEOPLE: SOMEWHAT DIFFICULT
GAD7 TOTAL SCORE: 17

## 2017-06-22 NOTE — Clinical Note
I met with Andrew for therapy intake.  Diagnostic Assessment not completed as he didn't bring completed intake paperwork with him to initial session.  Plan is to focus on increasing calm/decreasing anxiety and panic attacks and to address his past traumatic experiences with EMDR therapy.
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
## 2017-06-22 NOTE — TELEPHONE ENCOUNTER
Per Dr. Rosado in clinic, pt can stop taking azithromycin. OK to refill bentyl and genvoya.  Toyin Montiel RN

## 2017-06-22 NOTE — TELEPHONE ENCOUNTER
Per Dr. Rosado in clinic, OK to refill gabapentin and change instructions to 1 capsule (300mg) TID.  Toyin Montiel RN

## 2017-06-22 NOTE — MR AVS SNAPSHOT
MRN:[id number]                      After Visit Summary   6/22/2017    Andrew Lockwood    MRN: [id number]           Visit Information        Provider Department      6/22/2017 8:00 AM Venu Obrien, [facility] Generic      Your next 10 appointments already scheduled     Jul 05, 2017 12:00 PM CDT   Return Visit with Venu Obrien [facility] ([address] [address]   [phone]            Jul 06, 2017 10:15 AM CDT   (Arrive by 10:00 AM)   Return Visit with Carlee Hong MD   [facility] ([facility] [place])    [address] [place] [id number] [phone]            Jul 13, 2017  9:00 AM CDT   Return Visit with Venu Obrien [facility] ([address] [address]   [phone]            Jul 20, 2017 11:00 AM CDT   Return Visit with Venu Obrien [facility] ([address] [address]   [phone]              MyChart Information     [employer] gives you secure access to your electronic health record. If you see a primary care provider, you can also send messages to your care team and make appointments. If you have questions, please call your primary care clinic.  If you do not have a primary care provider, please call [phone] and they will assist you.        Care EveryWhere ID     This is your Care EveryWhere ID. This could be used by other organizations to access your [facility] medical records  [id number]        Equal Access to Services     RA JORDAN : Apollo Cody, warenettada lugee, qaybta kaalmanan reyna, dez salazar. [facility] [phone].    ATENCIÓN: Si habla español, tiene a kohli disposición servicios  mahoganyos de asistencia lingüística. Gurdeep al [phone].    We comply with applicable federal civil rights laws and [place] laws. We do not discriminate on the basis of race, color, national origin, age, disability sex, sexual orientation or gender identity.

## 2017-06-23 ASSESSMENT — PATIENT HEALTH QUESTIONNAIRE - PHQ9: SUM OF ALL RESPONSES TO PHQ QUESTIONS 1-9: 17

## 2017-06-23 ASSESSMENT — ANXIETY QUESTIONNAIRES: GAD7 TOTAL SCORE: 17

## 2017-07-10 DIAGNOSIS — B02.9 HERPES ZOSTER WITHOUT COMPLICATION: ICD-10-CM

## 2017-07-10 DIAGNOSIS — B20 HUMAN IMMUNODEFICIENCY VIRUS (HIV) DISEASE (H): ICD-10-CM

## 2017-07-10 DIAGNOSIS — E11.9 DM (DIABETES MELLITUS) (H): ICD-10-CM

## 2017-07-10 DIAGNOSIS — B58.2 TOXOPLASMA ENCEPHALITIS (H): ICD-10-CM

## 2017-07-10 DIAGNOSIS — R10.9 ABDOMINAL CRAMPING: ICD-10-CM

## 2017-07-11 RX ORDER — MONTELUKAST SODIUM 10 MG/1
10 TABLET ORAL AT BEDTIME
Qty: 90 TABLET | Refills: 2 | Status: SHIPPED | OUTPATIENT
Start: 2017-07-11 | End: 2017-11-07

## 2017-07-11 RX ORDER — DICYCLOMINE HYDROCHLORIDE 10 MG/1
10 CAPSULE ORAL
Qty: 240 CAPSULE | Refills: 0 | Status: SHIPPED | OUTPATIENT
Start: 2017-07-11 | End: 2017-11-07

## 2017-07-28 DIAGNOSIS — B20 HUMAN IMMUNODEFICIENCY VIRUS (HIV) DISEASE (H): Primary | ICD-10-CM

## 2017-08-01 DIAGNOSIS — B20 HUMAN IMMUNODEFICIENCY VIRUS (HIV) DISEASE (H): ICD-10-CM

## 2017-08-01 DIAGNOSIS — B58.2 TOXOPLASMA ENCEPHALITIS (H): ICD-10-CM

## 2017-08-01 RX ORDER — SULFAMETHOXAZOLE/TRIMETHOPRIM 800-160 MG
TABLET ORAL
Qty: 60 TABLET | Refills: 5 | Status: SHIPPED | OUTPATIENT
Start: 2017-08-01 | End: 2018-01-31

## 2017-08-01 RX ORDER — SULFAMETHOXAZOLE/TRIMETHOPRIM 800-160 MG
TABLET ORAL
Qty: 60 TABLET | Refills: 4 | OUTPATIENT
Start: 2017-08-01

## 2017-08-01 RX ORDER — ELVITEGRAVIR, COBICISTAT, EMTRICITABINE, AND TENOFOVIR ALAFENAMIDE 150; 150; 200; 10 MG/1; MG/1; MG/1; MG/1
TABLET ORAL
Qty: 30 TABLET | Refills: 4 | OUTPATIENT
Start: 2017-08-01

## 2017-08-01 RX ORDER — GABAPENTIN 300 MG/1
CAPSULE ORAL
Qty: 90 CAPSULE | Refills: 4 | OUTPATIENT
Start: 2017-08-01

## 2017-08-01 RX ORDER — AZITHROMYCIN 600 MG/1
TABLET, FILM COATED ORAL
Qty: 8 TABLET | Refills: 11 | Status: SHIPPED | OUTPATIENT
Start: 2017-08-01 | End: 2018-01-23

## 2017-08-08 DIAGNOSIS — B20 HUMAN IMMUNODEFICIENCY VIRUS (HIV) DISEASE (H): Primary | ICD-10-CM

## 2017-08-11 ENCOUNTER — OFFICE VISIT (OUTPATIENT)
Dept: FAMILY MEDICINE | Facility: CLINIC | Age: 52
End: 2017-08-11

## 2017-08-11 VITALS
DIASTOLIC BLOOD PRESSURE: 86 MMHG | RESPIRATION RATE: 18 BRPM | BODY MASS INDEX: 28.96 KG/M2 | WEIGHT: 174 LBS | SYSTOLIC BLOOD PRESSURE: 123 MMHG | HEART RATE: 114 BPM

## 2017-08-11 DIAGNOSIS — E11.9 DM TYPE 2, GOAL HBA1C 7%-8% (H): ICD-10-CM

## 2017-08-11 DIAGNOSIS — B20 HUMAN IMMUNODEFICIENCY VIRUS (HIV) DISEASE (H): ICD-10-CM

## 2017-08-11 DIAGNOSIS — E11.9 DM TYPE 2, GOAL HBA1C 7%-8% (H): Primary | ICD-10-CM

## 2017-08-11 DIAGNOSIS — R39.89 ABNORMAL URINE COLOR: ICD-10-CM

## 2017-08-11 LAB
ALBUMIN SERPL-MCNC: 4 G/DL (ref 3.4–5)
ALBUMIN UR-MCNC: NEGATIVE MG/DL
ALP SERPL-CCNC: 107 U/L (ref 40–150)
ALT SERPL W P-5'-P-CCNC: 23 U/L (ref 0–70)
ANION GAP SERPL CALCULATED.3IONS-SCNC: 10 MMOL/L (ref 3–14)
APPEARANCE UR: CLEAR
AST SERPL W P-5'-P-CCNC: 18 U/L (ref 0–45)
BASOPHILS # BLD AUTO: 0.1 10E9/L (ref 0–0.2)
BASOPHILS NFR BLD AUTO: 0.6 %
BILIRUB SERPL-MCNC: 0.5 MG/DL (ref 0.2–1.3)
BILIRUB UR QL STRIP: NEGATIVE
BUN SERPL-MCNC: 8 MG/DL (ref 7–30)
CALCIUM SERPL-MCNC: 9.3 MG/DL (ref 8.5–10.1)
CD3 CELLS # BLD: 1003 CELLS/UL (ref 603–2990)
CD3 CELLS NFR BLD: 38 % (ref 49–84)
CD3+CD4+ CELLS # BLD: 247 CELLS/UL (ref 441–2156)
CD3+CD4+ CELLS NFR BLD: 10 % (ref 28–63)
CD3+CD4+ CELLS/CD3+CD8+ CLL BLD: 0.37 % (ref 1.4–2.6)
CD3+CD8+ CELLS # BLD: 707 CELLS/UL (ref 125–1312)
CD3+CD8+ CELLS NFR BLD: 27 % (ref 10–40)
CHLORIDE SERPL-SCNC: 103 MMOL/L (ref 94–109)
CHOLEST SERPL-MCNC: 194 MG/DL
CO2 SERPL-SCNC: 23 MMOL/L (ref 20–32)
COLOR UR AUTO: YELLOW
CREAT SERPL-MCNC: 0.93 MG/DL (ref 0.66–1.25)
DIFFERENTIAL METHOD BLD: NORMAL
EOSINOPHIL # BLD AUTO: 0.3 10E9/L (ref 0–0.7)
EOSINOPHIL NFR BLD AUTO: 4.3 %
ERYTHROCYTE [DISTWIDTH] IN BLOOD BY AUTOMATED COUNT: 12.8 % (ref 10–15)
GFR SERPL CREATININE-BSD FRML MDRD: 86 ML/MIN/1.7M2
GLUCOSE SERPL-MCNC: 98 MG/DL (ref 70–99)
GLUCOSE UR STRIP-MCNC: NEGATIVE MG/DL
HBA1C MFR BLD: 6 % (ref 4.3–6)
HCT VFR BLD AUTO: 41.5 % (ref 40–53)
HDLC SERPL-MCNC: 37 MG/DL
HGB BLD-MCNC: 14.5 G/DL (ref 13.3–17.7)
HGB UR QL STRIP: NEGATIVE
IFC SPECIMEN: ABNORMAL
IMM GRANULOCYTES # BLD: 0.1 10E9/L (ref 0–0.4)
IMM GRANULOCYTES NFR BLD: 0.6 %
KETONES UR STRIP-MCNC: NEGATIVE MG/DL
LDLC SERPL CALC-MCNC: 88 MG/DL
LEUKOCYTE ESTERASE UR QL STRIP: NEGATIVE
LYMPHOCYTES # BLD AUTO: 2.7 10E9/L (ref 0.8–5.3)
LYMPHOCYTES NFR BLD AUTO: 33.8 %
MCH RBC QN AUTO: 31.5 PG (ref 26.5–33)
MCHC RBC AUTO-ENTMCNC: 34.9 G/DL (ref 31.5–36.5)
MCV RBC AUTO: 90 FL (ref 78–100)
MONOCYTES # BLD AUTO: 0.5 10E9/L (ref 0–1.3)
MONOCYTES NFR BLD AUTO: 6.5 %
MUCOUS THREADS #/AREA URNS LPF: PRESENT /LPF
NEUTROPHILS # BLD AUTO: 4.3 10E9/L (ref 1.6–8.3)
NEUTROPHILS NFR BLD AUTO: 54.2 %
NITRATE UR QL: NEGATIVE
NONHDLC SERPL-MCNC: 157 MG/DL
NRBC # BLD AUTO: 0 10*3/UL
NRBC BLD AUTO-RTO: 0 /100
PH UR STRIP: 5 PH (ref 5–7)
PLATELET # BLD AUTO: 251 10E9/L (ref 150–450)
POTASSIUM SERPL-SCNC: 3.4 MMOL/L (ref 3.4–5.3)
PROT SERPL-MCNC: 7.8 G/DL (ref 6.8–8.8)
RBC # BLD AUTO: 4.6 10E12/L (ref 4.4–5.9)
RBC #/AREA URNS AUTO: 2 /HPF (ref 0–2)
SODIUM SERPL-SCNC: 136 MMOL/L (ref 133–144)
SP GR UR STRIP: 1.02 (ref 1–1.03)
TRIGL SERPL-MCNC: 345 MG/DL
URN SPEC COLLECT METH UR: ABNORMAL
UROBILINOGEN UR STRIP-MCNC: 0 MG/DL (ref 0–2)
WBC # BLD AUTO: 7.9 10E9/L (ref 4–11)
WBC #/AREA URNS AUTO: <1 /HPF (ref 0–2)

## 2017-08-11 ASSESSMENT — PAIN SCALES - GENERAL: PAINLEVEL: NO PAIN (0)

## 2017-08-11 NOTE — NURSING NOTE
Chief Complaint   Patient presents with     Gastrointestinal Problem     Patient is here to follow up with GI concerns     Felisa Blanca CMA 12:50 PM on 8/11/2017.

## 2017-08-11 NOTE — PATIENT INSTRUCTIONS
[facility] Medication Refill Request Information:  * Please contact your pharmacy regarding ANY request for medication refills.  ** [facility] Prescription Fax = [phone]  * Please allow 3 business days for routine medication refills.  * Please allow 5 business days for controlled substance medication refills.     [facility] Test Result notification information:  *You will be notified with in 7-10 days of your appointment day regarding the results of your test.  If you are on MyChart you will be notified as soon as the provider has reviewed the results and signed off on them.    [facility] [phone]

## 2017-08-11 NOTE — MR AVS SNAPSHOT
After Visit Summary   8/11/2017    Andrew Lockwood    MRN: [id number]           Patient Information     Date Of Birth          11/27/1967        Visit Information        Provider Department      8/11/2017 1:05 PM Chevy Reynoso MD [facility]        Today's Diagnoses     DM type 2, goal HbA1c 7%-8% (H)    -  1    Abnormal urine color          Care Instructions    Primary Care Center Medication Refill Request Information:  * Please contact your pharmacy regarding ANY request for medication refills.  ** [facility] Prescription Fax = [phone]  * Please allow 3 business days for routine medication refills.  * Please allow 5 business days for controlled substance medication refills.     Primary Care Center Test Result notification information:  *You will be notified with in 7-10 days of your appointment day regarding the results of your test.  If you are on MyChart you will be notified as soon as the provider has reviewed the results and signed off on them.    Primary Care Center [phone]             Follow-ups after your visit        Your next 10 appointments already scheduled     Aug 11, 2017  1:45 PM CDT   LAB with [facility] ([facility])    [address] [place] [id number] [phone]           Patient must bring picture ID. Patient should be prepared to give a urine specimen  Please do not eat 10-12 hours before your appointment if you are coming in fasting for labs on lipids, cholesterol, or glucose (sugar). Pregnant women should follow their Care Team instructions. Water with medications is okay. Do not drink coffee or other fluids. If you have concerns about taking  your medications, please ask at office or if scheduling via Reunify, send a message by clicking on Secure Messaging, Message Your Care Team.            Aug 22, 2017  2:20 PM CDT   (Arrive by 2:05 PM)   Return Visit with Aguila Ingram MD     [facility] ([facility])    [address]  4th Floor  [facility] [address]   [phone]            Aug 25, 2017  9:20 AM CDT   (Arrive by 9:05 AM)   RETURN HAND with Evin Zamorano MD   [facility] ([facility])    [address]  [address]th [address]  [facility] [address] [phone]            Aug 31, 2017  9:00 AM CDT   (Arrive by 8:45 AM)   Return Visit with Sharon Rosado MD   [facility] ([facility])    [address]  3rd [address]  [facility] [address] [phone]              Future tests that were ordered for you today     Open Future Orders        Priority Expected Expires Ordered    UA with Micro reflex to Culture Routine 8/11/2017 8/11/2018 8/11/2017    Lipid panel reflex to direct LDL Routine 8/11/2017 8/25/2017 8/11/2017    Hemoglobin A1c Routine 8/11/2017 8/25/2017 8/11/2017            Who to contact     Please call your clinic at [phone] to:    Ask questions about your health    Make or cancel appointments    Discuss your medicines    Learn about your test results    Speak to your doctor   If you have compliments or concerns about an experience at your clinic, or if you wish to file a complaint, please contact [facility] Patient Relations at [phone] or email us at [email or web address]         Additional Information About Your Visit        Chef Dovunquehart Information     [email or web address].cot gives you secure access to your electronic health record. If you see a primary care provider, you can also send messages to your care team and make appointments. If you have questions, please call your primary care clinic.  If you do not have a primary care provider, please call [phone] and they will assist you.      Scholaroo is an electronic gateway that provides easy, online access to your  medical records. With [email or web address], you can request a clinic appointment, read your test results, renew a prescription or communicate with your care team.     To access your existing account, please contact your [facility] or call [phone] for assistance.        Care EveryWhere ID     This is your Care EveryWhere ID. This could be used by other organizations to access your [place] medical records  [id number]        Your Vitals Were     Pulse Respirations BMI (Body Mass Index)             114 18 28.96 kg/m2          Blood Pressure from Last 3 Encounters:   08/11/17 123/86   06/10/17 130/82   05/26/17 102/73    Weight from Last 3 Encounters:   08/11/17 78.9 kg (174 lb)   06/10/17 79.8 kg (176 lb)   05/26/17 82.1 kg (181 lb)               Primary Care Provider Office Phone # Fax #    Sharon Rosado [phone]       [address] [place] [address]        Equal Access to Services     RA JORDAN : Hadii aad ku hadasho Soomaali, waaxda luqadaha, qaybta kaalmada adeegyada, waxay idiin haytheresen tristin moreno . So [facility] [phone].    ATENCIÓN: Si habla español, tiene a kohli disposición servicios gratuitos de asistencia lingüística. Ll[facility] [phone].    We comply with applicable federal civil rights laws and [place] laws. We do not discriminate on the basis of race, color, national origin, age, disability sex, sexual orientation or gender identity.            Thank you!     Thank you for choosing [facility]  for your care. Our goal is always to provide you with excellent care. Hearing back from our patients is one way we can continue to improve our services. Please take a few minutes to complete the written survey that you may receive in the mail after your visit with us. Thank you!             Your Updated Medication List - Protect others around you: Learn how to safely use, store and throw away your medicines at  www.disposemymeds.org.          This list is accurate as of: 8/11/17  1:23 PM.  Always use your most recent med list.                   Brand Name Dispense Instructions for use Diagnosis    albuterol 108 (90 BASE) MCG/ACT Inhaler    PROAIR HFA/PROVENTIL HFA/VENTOLIN HFA    1 Inhaler    Inhale 2 puffs into the lungs every 6 hours as needed for shortness of breath / dyspnea or wheezing    Cough       * AZITHROMYCIN PO      Take 600 mg by mouth once a week        * azithromycin 600 MG tablet    ZITHROMAX    8 tablet    TAKE 2 TABLETS (1200 MG) BY MOUTH ONCE EVERY 7 DAYS    Human immunodeficiency virus (HIV) disease (H)       benzoyl peroxide 5 % Liqd     226 g    Use daily in shower    Folliculitis       blood glucose lancets standard    no brand specified    100 Box    Use to test blood sugar four times daily or as directed.    Type 2 diabetes mellitus without complication, without long-term current use of insulin (H)       blood glucose monitoring meter device kit    no brand specified    1 kit    Use to test blood sugar four times daily or as directed.    Type 2 diabetes mellitus without complication, without long-term current use of insulin (H)       blood glucose monitoring test strip    no brand specified    100 each    Use to test blood sugars four times daily or as directed    Type 2 diabetes mellitus without complication, without long-term current use of insulin (H)       cyclopentolate 1 % ophthalmic solution    CYCLOGYL    1 Bottle    Place 1 drop Into the left eye 2 times daily    Traumatic iridocyclitis of left eye       dicyclomine 10 MG capsule    BENTYL    240 capsule    Take 1 capsule (10 mg) by mouth 4 times daily (before meals and nightly) /BEDTIME    Abdominal cramping       Erqhfbp-Mofsh-Rybyjgmr-TenofAF [id number] MG Tabs per tablet    GENVOYA    30 tablet    Take 1 tablet by mouth daily    Human immunodeficiency virus (HIV) disease (H)       fluticasone 50 MCG/ACT spray    FLONASE    1 Bottle     Spray 1 spray into both nostrils daily    Abdominal cramping       * gabapentin 300 MG capsule    NEURONTIN    90 capsule    TAKE ONE CAPSULE BY MOUTH ON DAY 1, THEN TAKE ONE CAPSULE TWICE DAILY ON DAY 2 THEN TAKE ONE CAPSULE THREE TIMES A DAY.    Herpes zoster without complication       * gabapentin 300 MG capsule    NEURONTIN    90 capsule    Take 1 capsule (300 mg) by mouth 3 times daily    Herpes zoster without complication       meclizine 25 MG tablet    ANTIVERT    20 tablet    Take 1 tablet (25 mg) by mouth 3 times daily as needed for dizziness    Vertigo       metFORMIN 500 MG tablet    GLUCOPHAGE    120 tablet    TAKE TWO TABLETS BY MOUTH TWICE A DAY WITH MEALS    DM (diabetes mellitus) (H)       minocycline 50 MG capsule    MINOCIN/DYNACIN    60 capsule    Take 1 capsule (50 mg) by mouth 2 times daily    Folliculitis       montelukast 10 MG tablet    SINGULAIR    90 tablet    Take 1 tablet (10 mg) by mouth At Bedtime    Abdominal cramping       * omeprazole 20 MG CR capsule    priLOSEC    60 capsule    TAKE ONE CAPSULE BY MOUTH EVERY DAY    Epigastric pain       * omeprazole 20 MG CR capsule    priLOSEC    90 capsule    TAKE ONE CAPSULE BY MOUTH EVERY DAY    Epigastric pain       polyethylene glycol powder    MIRALAX    527 g    Take 17 g (1 capful) by mouth daily    Constipation       prednisoLONE acetate 1 % ophthalmic susp    PRED FORTE    1 Bottle    Place 1 drop Into the left eye 4 times daily    Traumatic iridocyclitis of left eye       saline 0.65 % (SOLN) Soln    AYR SALINE NASAL DROPS    1 Bottle    Spray 1-2 drops in nostril 2 times daily        sildenafil 50 MG cap/tab    VIAGRA    30 tablet    Take 0.5 tablets (25 mg) by mouth daily as needed for erectile dysfunction    Erectile dysfunction, unspecified erectile dysfunction type       simethicone 125 MG Chew chewable tablet    MYLICON    30 tablet    Take 1 tablet (125 mg) by mouth 4 times daily as needed for intestinal gas         sulfamethoxazole-trimethoprim 800-160 MG per tablet    BACTRIM DS/SEPTRA DS    60 tablet    TAKE 1 TABLET BY MOUTH 2 TIMES DAILY    Toxoplasma encephalitis (H), Human immunodeficiency virus (HIV) disease (H)       * Notice:  This list has 6 medication(s) that are the same as other medications prescribed for you. Read the directions carefully, and ask your doctor or other care provider to review them with you.

## 2017-08-16 LAB
HIV1 RNA # PLAS NAA DL=20: NORMAL {COPIES}/ML
HIV1 RNA SERPL NAA+PROBE-LOG#: NORMAL {LOG_COPIES}/ML

## 2017-08-22 ENCOUNTER — OFFICE VISIT (OUTPATIENT)
Dept: ORTHOPEDICS | Facility: CLINIC | Age: 52
End: 2017-08-22

## 2017-08-22 VITALS — WEIGHT: 176.3 LBS | HEIGHT: 65 IN | BODY MASS INDEX: 29.37 KG/M2

## 2017-08-22 DIAGNOSIS — M79.674 PAIN OF RIGHT GREAT TOE: ICD-10-CM

## 2017-08-22 DIAGNOSIS — M17.0 PRIMARY OSTEOARTHRITIS OF BOTH KNEES: Primary | ICD-10-CM

## 2017-08-22 DIAGNOSIS — M25.562 CHRONIC PAIN OF LEFT KNEE: ICD-10-CM

## 2017-08-22 DIAGNOSIS — M25.80 SESAMOIDITIS: ICD-10-CM

## 2017-08-22 DIAGNOSIS — G89.29 CHRONIC PAIN OF LEFT KNEE: ICD-10-CM

## 2017-08-22 NOTE — NURSING NOTE
[facility]  [address] [place] [id number]  Dept: [phone]  ______________________________________________________________________________    Patient: Andrew Lockwood   : 1967   MRN: [id number]   2017    INVASIVE PROCEDURE SAFETY CHECKLIST    Date: 2017   Procedure: Left Knee Cortisone Injection  Patient Name: Andrew Lockwood  MRN: [id number]  YOB: 1967    Action: Complete sections as appropriate. Any discrepancy results in a HARD COPY until resolved.     PRE PROCEDURE:  Patient ID verified with 2 identifiers (name and  or MRN): Yes  Procedure and site verified with patient/designee (when able): Yes  Accurate consent documentation in medical record: Yes  H&P (or appropriate assessment) documented in medical record: Yes  H&P must be up to 20 days prior to procedure and updates within 24 hours of procedure as applicable: Yes  Relevant diagnostic and radiology test results appropriately labeled and displayed as applicable: Yes  Procedure site(s) marked with provider initials: Yes    TIMEOUT:  Time-Out performed immediately prior to starting procedure, including verbal and active participation of all team members addressing the following:Yes  * Correct patient identify  * Confirmed that the correct side and site are marked  * An accurate procedure consent form  * Agreement on the procedure to be done  * Correct patient position  * Relevant images and results are properly labeled and appropriately displayed  * The need to administer antibiotics or fluids for irrigation purposes during the procedure as applicable   * Safety precautions based on patient history or medication use    DURING PROCEDURE: Verification of correct person, site, and procedures any time the responsibility for care of the patient is transferred to another member of the care team.

## 2017-08-22 NOTE — NURSING NOTE
"Reason For Visit:   Chief Complaint   Patient presents with     RECHECK     Pt. states that he is here today for Left Knee Injection and Pain. He mention that he started having pain again in the last 2 weeks. His last injection was on 01/03/2017, effective.                        HEIGHT: 5' 5\", WEIGHT: 176 lbs 4.8 oz, BMI: Body mass index is 29.34 kg/(m^2).      Current Outpatient Prescriptions   Medication Sig Dispense Refill     azithromycin (ZITHROMAX) 600 MG tablet TAKE 2 TABLETS (1200 MG) BY MOUTH ONCE EVERY 7 DAYS 8 tablet 11     sulfamethoxazole-trimethoprim (BACTRIM DS/SEPTRA DS) 800-160 MG per tablet TAKE 1 TABLET BY MOUTH 2 TIMES DAILY 60 tablet 5     dicyclomine (BENTYL) 10 MG capsule Take 1 capsule (10 mg) by mouth 4 times daily (before meals and nightly) /BEDTIME 240 capsule 0     montelukast (SINGULAIR) 10 MG tablet Take 1 tablet (10 mg) by mouth At Bedtime 90 tablet 2     gabapentin (NEURONTIN) 300 MG capsule Take 1 capsule (300 mg) by mouth 3 times daily 90 capsule 5     omeprazole (PRILOSEC) 20 MG CR capsule TAKE ONE CAPSULE BY MOUTH EVERY DAY 90 capsule 1     GENVOYA [phone] mg tablet Take 1 tablet by mouth daily 30 tablet 5     polyethylene glycol (MIRALAX) powder Take 17 g (1 capful) by mouth daily 527 g 0     metFORMIN (GLUCOPHAGE) 500 MG tablet TAKE TWO TABLETS BY MOUTH TWICE A DAY WITH MEALS 120 tablet 3     omeprazole (PRILOSEC) 20 MG CR capsule TAKE ONE CAPSULE BY MOUTH EVERY DAY 60 capsule 3     simethicone (MYLICON) 125 MG CHEW chewable tablet Take 1 tablet (125 mg) by mouth 4 times daily as needed for intestinal gas 30 tablet 0     blood glucose monitoring (NO BRAND SPECIFIED) meter device kit Use to test blood sugar four times daily or as directed. 1 kit 0     blood glucose monitoring (NO BRAND SPECIFIED) test strip Use to test blood sugars four times daily or as directed 100 each 5     blood glucose (NO BRAND SPECIFIED) lancets standard Use to test blood sugar four times daily or " as directed. 100 Box 5     meclizine (ANTIVERT) 25 MG tablet Take 1 tablet (25 mg) by mouth 3 times daily as needed for dizziness 20 tablet 1     cyclopentolate (CYCLOGYL) 1 % ophthalmic solution Place 1 drop Into the left eye 2 times daily 1 Bottle 0     prednisoLONE acetate (PRED FORTE) 1 % ophthalmic susp Place 1 drop Into the left eye 4 times daily 1 Bottle 0     saline (AYR SALINE NASAL DROPS) 0.65 % (SOLN) SOLN Spray 1-2 drops in nostril 2 times daily 1 Bottle 0     minocycline (MINOCIN/DYNACIN) 50 MG capsule Take 1 capsule (50 mg) by mouth 2 times daily 60 capsule 0     benzoyl peroxide 5 % LIQD Use daily in shower 226 g 11     fluticasone (FLONASE) 50 MCG/ACT spray Spray 1 spray into both nostrils daily 1 Bottle 1     albuterol (PROAIR HFA/PROVENTIL HFA/VENTOLIN HFA) 108 (90 BASE) MCG/ACT Inhaler Inhale 2 puffs into the lungs every 6 hours as needed for shortness of breath / dyspnea or wheezing 1 Inhaler 0     sildenafil (VIAGRA) 50 MG cap/tab Take 0.5 tablets (25 mg) by mouth daily as needed for erectile dysfunction 30 tablet 0     gabapentin (NEURONTIN) 300 MG capsule TAKE ONE CAPSULE BY MOUTH ON DAY 1, THEN TAKE ONE CAPSULE TWICE DAILY ON DAY 2 THEN TAKE ONE CAPSULE THREE TIMES A DAY. 90 capsule 3     AZITHROMYCIN PO Take 600 mg by mouth once a week            Allergies   Allergen Reactions     Milk [Lac Bovis] Hives     Tylenol [Acetaminophen] Itching

## 2017-08-22 NOTE — PROGRESS NOTES
"HISTORY OF PRESENT ILLNESS    Mr. Lockwood returns with worse left knee pain. He had an injection last year that lasted him about 9 months. He also has developed some right toe pain which he states comes and goes with more walking and activity. He would like another injection today if possible.  Location: left knee and right great toe  Quality:  sharp    Severity:  5   Of 10 at worst and   1 of 10 at best    Duration: a few weeks  Timing: with more walking  Context: occurs while walking  Associated symptoms: swelling at times  Additional history: as documented  Problem list, Medication list, Allergies, and Medical/Social/Surgical histories reviewed in [facility] and updated as appropriate.     REVIEW OF SYSTEMS 8/22  Constitutional: negative for fever, chills, change in weight  Skin: negative for worrisome rashes, moles or lesions  Eyes: negative for vision changes or irritation  Neuro: negative for weakness, dizziness or paresthesias  Psychiatric: negative for changes in mood or affect  CV: negative for chest pain, palpitations or peripheral edema  ENT / Mouth: negative for ear, mouth and throat problems       PHYSICAL EXAM    Vital Signs: Ht 1.651 m (5' 5\")  Wt 80 kg (176 lb 4.8 oz)  BMI 29.34 kg/m2 Patient declined being weighed. Body mass index is 29.34 kg/(m^2).    General  - normal appearance, in no obvious distress  CV  - normal popliteal pulse  Pulm  - normal respiratory pattern, non-labored  Musculoskeletal - left knee and right great toe  - stance: normal gait without limp, no obvious leg length discrepancy  - inspection: trace effusion, no ecchymosis, normal muscle tone, normal bone and joint alignment, no obvious deformity, no swelling in toe, some pain with palpation of right great toe  - palpation: no medial joint line tenderness, patella and patellar tendon non-tender, normal popliteal pulse  - ROM: 135 degrees flexion, -5 degrees extension, pain at terminal extension passively  - strength: 5/5 in flexion, " 5/5 in extension  - neuro: no sensory or motor deficit  - special tests:  (-) Lachman  (-) anterior drawer  (-) posterior drawer  (-) pivot shift  (+) Raoul laterally- left knee    (-) varus at 0 and 30 degrees flexion  (-) valgus at 0 and 30 degrees flexion  (+) Richie s compression test- left knee  (-) patellar apprehension  Neuro  - no sensory or motor deficit, grossly normal coordination, normal muscle tone  Skin  - no ecchymosis, erythema, warmth, or induration, no obvious rash  Psych  - interactive, appropriate, normal mood and affect       ASSESSMENT    Mr. Lockwood is a 50 year old year old male who is in the office today for : left knee pain due to patellofemoral arthrosis, medial joint arthritis, and great toe pain right due to sesamoiditis      PLAN    Given injection for left knee  Given HEP for great toe, ice PRN  voltaren gel PRN    I recommend ice x 10 min every 2 hours as needed    PT order given for knees    Andrew had all of their questions answered and understand to refrain from activities that are not tolerable    I recommend that Andrew return to my office for a re-examination in a few months after he has completed PT for his knees   He should follow up sooner if the condition worsens or if other problems arise.  It was a pleasure taking care of Andrew.    Dr. Aguila Ingram    PROCEDURE:      left  Knee joint injection   NDC [id number] kenalog     The patient was apprised of the risks and the benefits of the procedure and consented and a written consent was signed.   The patient s  KNEE was sterilely prepped with Betadine.   40 mg of triamcinolone suspension was drawn up into a 5 mL syringe with 2 mL of 1% lidocaine   The patient was injected with a 1.5-inch 25-gauge needle at the_superiorlateral aspect of their knee joint   There were no complications. The patient tolerated the procedure well. There was minimal bleeding.   The patient was instructed to ice the knee upon leaving clinic and refrain  from overuse over the next 3 days.   The patient was instructed to go to the emergency room with any usual pain, swelling, or redness occurred in the injected area.   The patient was given a followup appointment to evaluate response to the injection to their increased range of motion and reduction of pain.

## 2017-08-22 NOTE — LETTER
"8/22/2017       RE: Andrew Lockwood  [address]   [facility] [address]     Dear Colleague,    Thank you for referring your patient, Andrew Lockwood, to the [facility] at [facility]. Please see a copy of my visit note below.    HISTORY OF PRESENT ILLNESS    Mr. Lockwood returns with worse left knee pain. He had an injection last year that lasted him about 9 months. He also has developed some right toe pain which he states comes and goes with more walking and activity. He would like another injection today if possible.  Location: left knee and right great toe  Quality:  sharp    Severity:  5   Of 10 at worst and   1 of 10 at best    Duration: a few weeks  Timing: with more walking  Context: occurs while walking  Associated symptoms: swelling at times  Additional history: as documented  Problem list, Medication list, Allergies, and Medical/Social/Surgical histories reviewed in [facility] and updated as appropriate.     REVIEW OF SYSTEMS 8/22  Constitutional: negative for fever, chills, change in weight  Skin: negative for worrisome rashes, moles or lesions  Eyes: negative for vision changes or irritation  Neuro: negative for weakness, dizziness or paresthesias  Psychiatric: negative for changes in mood or affect  CV: negative for chest pain, palpitations or peripheral edema  ENT / Mouth: negative for ear, mouth and throat problems       PHYSICAL EXAM    Vital Signs: Ht 1.651 m (5' 5\")  Wt 80 kg (176 lb 4.8 oz)  BMI 29.34 kg/m2 Patient declined being weighed. Body mass index is 29.34 kg/(m^2).    General  - normal appearance, in no obvious distress  CV  - normal popliteal pulse  Pulm  - normal respiratory pattern, non-labored  Musculoskeletal - left knee and right great toe  - stance: normal gait without limp, no obvious leg length discrepancy  - inspection: trace effusion, no ecchymosis, normal muscle tone, normal bone and joint alignment, no obvious deformity, " no swelling in toe, some pain with palpation of right great toe  - palpation: no medial joint line tenderness, patella and patellar tendon non-tender, normal popliteal pulse  - ROM: 135 degrees flexion, -5 degrees extension, pain at terminal extension passively  - strength: 5/5 in flexion, 5/5 in extension  - neuro: no sensory or motor deficit  - special tests:  (-) Lachman  (-) anterior drawer  (-) posterior drawer  (-) pivot shift  (+) Raoul laterally- left knee    (-) varus at 0 and 30 degrees flexion  (-) valgus at 0 and 30 degrees flexion  (+) Richie s compression test- left knee  (-) patellar apprehension  Neuro  - no sensory or motor deficit, grossly normal coordination, normal muscle tone  Skin  - no ecchymosis, erythema, warmth, or induration, no obvious rash  Psych  - interactive, appropriate, normal mood and affect       ASSESSMENT    Mr. Lockwood is a 50 year old year old male who is in the office today for : left knee pain due to patellofemoral arthrosis, medial joint arthritis, and great toe pain right due to sesamoiditis      PLAN    Given injection for left knee  Given HEP for great toe, ice PRN  voltaren gel PRN    I recommend ice x 10 min every 2 hours as needed    PT order given for knees    Andrew had all of their questions answered and understand to refrain from activities that are not tolerable    I recommend that Andrew return to my office for a re-examination in a few months after he has completed PT for his knees   He should follow up sooner if the condition worsens or if other problems arise.  It was a pleasure taking care of Andrew.    Dr. Aguila Ingram    PROCEDURE:      left  Knee joint injection   NDC 9308-6473-44 kenalog     The patient was apprised of the risks and the benefits of the procedure and consented and a written consent was signed.   The patient s  KNEE was sterilely prepped with Betadine.   40 mg of triamcinolone suspension was drawn up into a 5 mL syringe with 2 mL of 1%  lidocaine   The patient was injected with a 1.5-inch 25-gauge needle at the_superiorlateral aspect of their knee joint   There were no complications. The patient tolerated the procedure well. There was minimal bleeding.   The patient was instructed to ice the knee upon leaving clinic and refrain from overuse over the next 3 days.   The patient was instructed to go to the emergency room with any usual pain, swelling, or redness occurred in the injected area.   The patient was given a followup appointment to evaluate response to the injection to their increased range of motion and reduction of pain.      Aguila Ingram MD

## 2017-08-22 NOTE — MR AVS SNAPSHOT
After Visit Summary   8/22/2017    Andrew Lockwood    MRN: [id number]           Patient Information     Date Of Birth          11/27/1967        Visit Information        Provider Department      8/22/2017 2:20 PM Aguila Ingram MD [facility]        Today's Diagnoses     Primary osteoarthritis of both knees    -  1    Chronic pain of left knee        Sesamoiditis        Pain of right great toe           Follow-ups after your visit        Additional Services     PHYSICAL THERAPY REFERRAL (Internal)       Physical Therapy Referral                  Your next 10 appointments already scheduled     Aug 25, 2017  9:20 AM CDT   (Arrive by 9:05 AM)   RETURN HAND with Evin Zamorano MD   [facility] ([facility])    [address] [place] [address]  [address] [facility] [address] [phone]            Aug 31, 2017  9:00 AM CDT   (Arrive by 8:45 AM)   Return Visit with Sharon Rosado MD   [facility] ([facility])    [address] [place] [address]  [address] [facility] [address] [phone]              Who to contact     Please call your clinic at [phone] to:    Ask questions about your health    Make or cancel appointments    Discuss your medicines    Learn about your test results    Speak to your doctor   If you have compliments or concerns about an experience at your clinic, or if you wish to file a complaint, please contact [facility] Patient Relations at [phone] or email us at [email or web address]         Additional Information About Your Visit        MyChart Information     [employer] gives you secure access to your electronic health record. If you see a primary care provider, you can also send messages to your care team and make appointments. If you have questions, please call your primary care clinic.  If you do not have  "a primary care provider, please call [phone] and they will assist you.      [employer] is an electronic gateway that provides easy, online access to your medical records. With [employer], you can request a clinic appointment, read your test results, renew a prescription or communicate with your care team.     To access your existing account, please contact your [facility] or call [phone] for assistance.        Care EveryWhere ID     This is your Care EveryWhere ID. This could be used by other organizations to access your [place] medical records  [id number]        Your Vitals Were     Height BMI (Body Mass Index)                1.651 m (5' 5\") 29.34 kg/m2           Blood Pressure from Last 3 Encounters:   08/11/17 123/86   06/10/17 130/82   05/26/17 102/73    Weight from Last 3 Encounters:   08/22/17 80 kg (176 lb 4.8 oz)   08/11/17 78.9 kg (174 lb)   06/10/17 79.8 kg (176 lb)              We Performed the Following     DRAIN/INJECT LARGE JOINT/BURSA     KENALOG PER 10 MG     PHYSICAL THERAPY REFERRAL (Internal)          Today's Medication Changes          These changes are accurate as of: 8/22/17  3:07 PM.  If you have any questions, ask your nurse or doctor.               Start taking these medicines.        Dose/Directions    diclofenac 1 % Gel topical gel   Commonly known as:  VOLTAREN   Used for:  Primary osteoarthritis of both knees   Started by:  Aguila Ingram MD        Apply 4 grams to knees or 2 grams to hands four times daily using enclosed dosing card.   Quantity:  100 g   Refills:  1            Where to get your medicines      These medications were sent to [place], [place] - [facility] [address], [facility] [address]    Hours:  TRANSPLANT PHONE NUMBER [phone] Phone:  [phone]     diclofenac 1 % Gel topical gel                Primary Care Provider Office Phone # Fax #    Sharon " Carrie Rosado [phone]       [address] [place] [address]        Equal Access to Services     RA JORDAN : Hadmagnolia carissa lockwood kat Cody, maverick jjcharles, luis enrique reyna, dez alfaro josh salazar. So [facility] [phone].    ATENCIÓN: Si habla español, tiene a kohli disposición servicios gratuitos de asistencia lingüística. Llame al [phone].    We comply with applicable federal civil rights laws and [place] laws. We do not discriminate on the basis of race, color, national origin, age, disability sex, sexual orientation or gender identity.            Thank you!     Thank you for choosing [facility]  for your care. Our goal is always to provide you with excellent care. Hearing back from our patients is one way we can continue to improve our services. Please take a few minutes to complete the written survey that you may receive in the mail after your visit with us. Thank you!             Your Updated Medication List - Protect others around you: Learn how to safely use, store and throw away your medicines at www.disposemymeds.org.          This list is accurate as of: 8/22/17  3:07 PM.  Always use your most recent med list.                   Brand Name Dispense Instructions for use Diagnosis    albuterol 108 (90 BASE) MCG/ACT Inhaler    PROAIR HFA/PROVENTIL HFA/VENTOLIN HFA    1 Inhaler    Inhale 2 puffs into the lungs every 6 hours as needed for shortness of breath / dyspnea or wheezing    Cough       * AZITHROMYCIN PO      Take 600 mg by mouth once a week        * azithromycin 600 MG tablet    ZITHROMAX    8 tablet    TAKE 2 TABLETS (1200 MG) BY MOUTH ONCE EVERY 7 DAYS    Human immunodeficiency virus (HIV) disease (H)       benzoyl peroxide 5 % Liqd     226 g    Use daily in shower    Folliculitis       blood glucose lancets standard    no brand specified    100 Box    Use to test blood sugar four times daily or as  directed.    Type 2 diabetes mellitus without complication, without long-term current use of insulin (H)       blood glucose monitoring meter device kit    no brand specified    1 kit    Use to test blood sugar four times daily or as directed.    Type 2 diabetes mellitus without complication, without long-term current use of insulin (H)       blood glucose monitoring test strip    no brand specified    100 each    Use to test blood sugars four times daily or as directed    Type 2 diabetes mellitus without complication, without long-term current use of insulin (H)       cyclopentolate 1 % ophthalmic solution    CYCLOGYL    1 Bottle    Place 1 drop Into the left eye 2 times daily    Traumatic iridocyclitis of left eye       diclofenac 1 % Gel topical gel    VOLTAREN    100 g    Apply 4 grams to knees or 2 grams to hands four times daily using enclosed dosing card.    Primary osteoarthritis of both knees       dicyclomine 10 MG capsule    BENTYL    240 capsule    Take 1 capsule (10 mg) by mouth 4 times daily (before meals and nightly) /BEDTIME    Abdominal cramping       Tszslke-Rcoka-Ihnomnsp-TenofAF 439-121-286-10 MG Tabs per tablet    GENVOYA    30 tablet    Take 1 tablet by mouth daily    Human immunodeficiency virus (HIV) disease (H)       fluticasone 50 MCG/ACT spray    FLONASE    1 Bottle    Spray 1 spray into both nostrils daily    Abdominal cramping       * gabapentin 300 MG capsule    NEURONTIN    90 capsule    TAKE ONE CAPSULE BY MOUTH ON DAY 1, THEN TAKE ONE CAPSULE TWICE DAILY ON DAY 2 THEN TAKE ONE CAPSULE THREE TIMES A DAY.    Herpes zoster without complication       * gabapentin 300 MG capsule    NEURONTIN    90 capsule    Take 1 capsule (300 mg) by mouth 3 times daily    Herpes zoster without complication       meclizine 25 MG tablet    ANTIVERT    20 tablet    Take 1 tablet (25 mg) by mouth 3 times daily as needed for dizziness    Vertigo       metFORMIN 500 MG tablet    GLUCOPHAGE    120 tablet     TAKE TWO TABLETS BY MOUTH TWICE A DAY WITH MEALS    DM (diabetes mellitus) (H)       minocycline 50 MG capsule    MINOCIN/DYNACIN    60 capsule    Take 1 capsule (50 mg) by mouth 2 times daily    Folliculitis       montelukast 10 MG tablet    SINGULAIR    90 tablet    Take 1 tablet (10 mg) by mouth At Bedtime    Abdominal cramping       * omeprazole 20 MG CR capsule    priLOSEC    60 capsule    TAKE ONE CAPSULE BY MOUTH EVERY DAY    Epigastric pain       * omeprazole 20 MG CR capsule    priLOSEC    90 capsule    TAKE ONE CAPSULE BY MOUTH EVERY DAY    Epigastric pain       polyethylene glycol powder    MIRALAX    527 g    Take 17 g (1 capful) by mouth daily    Constipation       prednisoLONE acetate 1 % ophthalmic susp    PRED FORTE    1 Bottle    Place 1 drop Into the left eye 4 times daily    Traumatic iridocyclitis of left eye       saline 0.65 % (SOLN) Soln    AYR SALINE NASAL DROPS    1 Bottle    Spray 1-2 drops in nostril 2 times daily        sildenafil 50 MG cap/tab    VIAGRA    30 tablet    Take 0.5 tablets (25 mg) by mouth daily as needed for erectile dysfunction    Erectile dysfunction, unspecified erectile dysfunction type       simethicone 125 MG Chew chewable tablet    MYLICON    30 tablet    Take 1 tablet (125 mg) by mouth 4 times daily as needed for intestinal gas        sulfamethoxazole-trimethoprim 800-160 MG per tablet    BACTRIM DS/SEPTRA DS    60 tablet    TAKE 1 TABLET BY MOUTH 2 TIMES DAILY    Toxoplasma encephalitis (H), Human immunodeficiency virus (HIV) disease (H)       * Notice:  This list has 6 medication(s) that are the same as other medications prescribed for you. Read the directions carefully, and ask your doctor or other care provider to review them with you.

## 2017-08-28 NOTE — PROGRESS NOTES
SUBJECTIVE:    Pt is a 49 year old male with pmh of     Patient Active Problem List   Diagnosis     Allergic rhinitis due to other allergen     Brain lesion     Toxoplasma encephalitis (H)     Pulmonary nodules     Human immunodeficiency virus (HIV) disease (H)     Folliculitis     Prurigo nodularis     Epidermal cyst     Shoulder joint pain, unspecified laterality     CNS toxoplasmosis (H)     Constipation     Non-intractable vomiting with nausea, vomiting of unspecified type     Thrush     Insomnia, unspecified insomnia     Bowel obstruction (H)     Toxoplasmosis     Gastroesophageal reflux disease     Headache     Aphthous ulcer     Type 2 diabetes mellitus (H)     Small bowel obstruction (H)     SBO (small bowel obstruction) (H)     Slow transit constipation     Periungual wart     Herpes zoster     Erectile dysfunction, unspecified erectile dysfunction type     Insomnia, unspecified type     Preventative health care     Pain of right thumb     Rupture of ulnar collateral ligament of right thumb     Aftercare following surgery of the musculoskeletal system     Panic disorder     Generalized anxiety disorder     Major depressive disorder, single episode, unspecified       who is here for evaluation of had concerns including Gastrointestinal Problem.    Here for a few issues.  One, DM, due for labs, see those ordered.   Two, he notes urine darker than usual, not blood, no other  or GI complaints currently, no fever. ER note of May reviewed, GI concerns, none currently. Wt stable.    Allergies   Allergen Reactions     Milk [Lac Bovis] Hives     Tylenol [Acetaminophen] Itching     Overall feeling well.      Current Outpatient Prescriptions   Medication Sig Dispense Refill     azithromycin (ZITHROMAX) 600 MG tablet TAKE 2 TABLETS (1200 MG) BY MOUTH ONCE EVERY 7 DAYS 8 tablet 11     sulfamethoxazole-trimethoprim (BACTRIM DS/SEPTRA DS) 800-160 MG per tablet TAKE 1 TABLET BY MOUTH 2 TIMES DAILY 60 tablet 5      dicyclomine (BENTYL) 10 MG capsule Take 1 capsule (10 mg) by mouth 4 times daily (before meals and nightly) /BEDTIME 240 capsule 0     montelukast (SINGULAIR) 10 MG tablet Take 1 tablet (10 mg) by mouth At Bedtime 90 tablet 2     gabapentin (NEURONTIN) 300 MG capsule Take 1 capsule (300 mg) by mouth 3 times daily 90 capsule 5     omeprazole (PRILOSEC) 20 MG CR capsule TAKE ONE CAPSULE BY MOUTH EVERY DAY 90 capsule 1     GENVOYA [id number] mg tablet Take 1 tablet by mouth daily 30 tablet 5     polyethylene glycol (MIRALAX) powder Take 17 g (1 capful) by mouth daily 527 g 0     metFORMIN (GLUCOPHAGE) 500 MG tablet TAKE TWO TABLETS BY MOUTH TWICE A DAY WITH MEALS 120 tablet 3     omeprazole (PRILOSEC) 20 MG CR capsule TAKE ONE CAPSULE BY MOUTH EVERY DAY 60 capsule 3     simethicone (MYLICON) 125 MG CHEW chewable tablet Take 1 tablet (125 mg) by mouth 4 times daily as needed for intestinal gas 30 tablet 0     blood glucose monitoring (NO BRAND SPECIFIED) meter device kit Use to test blood sugar four times daily or as directed. 1 kit 0     blood glucose monitoring (NO BRAND SPECIFIED) test strip Use to test blood sugars four times daily or as directed 100 each 5     blood glucose (NO BRAND SPECIFIED) lancets standard Use to test blood sugar four times daily or as directed. 100 Box 5     meclizine (ANTIVERT) 25 MG tablet Take 1 tablet (25 mg) by mouth 3 times daily as needed for dizziness 20 tablet 1     cyclopentolate (CYCLOGYL) 1 % ophthalmic solution Place 1 drop Into the left eye 2 times daily 1 Bottle 0     prednisoLONE acetate (PRED FORTE) 1 % ophthalmic susp Place 1 drop Into the left eye 4 times daily 1 Bottle 0     saline (AYR SALINE NASAL DROPS) 0.65 % (SOLN) SOLN Spray 1-2 drops in nostril 2 times daily 1 Bottle 0     minocycline (MINOCIN/DYNACIN) 50 MG capsule Take 1 capsule (50 mg) by mouth 2 times daily 60 capsule 0     benzoyl peroxide 5 % LIQD Use daily in shower 226 g 11     fluticasone (FLONASE) 50  MCG/ACT spray Spray 1 spray into both nostrils daily 1 Bottle 1     albuterol (PROAIR HFA/PROVENTIL HFA/VENTOLIN HFA) 108 (90 BASE) MCG/ACT Inhaler Inhale 2 puffs into the lungs every 6 hours as needed for shortness of breath / dyspnea or wheezing 1 Inhaler 0     sildenafil (VIAGRA) 50 MG cap/tab Take 0.5 tablets (25 mg) by mouth daily as needed for erectile dysfunction 30 tablet 0     gabapentin (NEURONTIN) 300 MG capsule TAKE ONE CAPSULE BY MOUTH ON DAY 1, THEN TAKE ONE CAPSULE TWICE DAILY ON DAY 2 THEN TAKE ONE CAPSULE THREE TIMES A DAY. 90 capsule 3     AZITHROMYCIN PO Take 600 mg by mouth once a week       diclofenac (VOLTAREN) 1 % GEL topical gel Apply 4 grams to knees or 2 grams to hands four times daily using enclosed dosing card. 100 g 1       Social History   Substance Use Topics     Smoking status: Current Every Day Smoker     Packs/day: 0.25     Last attempt to quit: 10/28/2016     Smokeless tobacco: Former User      Comment: just smoked very rarely/socailly in the past     Alcohol use No      Comment: Last etoh in 2007           OBJECTIVE:  /86 (BP Location: Right arm, Patient Position: Sitting, Cuff Size: Adult Regular)  Pulse 114  Resp 18  Wt 78.9 kg (174 lb)  BMI 28.96 kg/m2  GENERAL APPEARANCE: Alert, no acute distress  RESP: lungs clear to auscultation   CV: normal rate, regular rhythm, no murmur or gallop  ABDOMEN: soft, no organomegaly, masses or tenderness  NEURO: Alert, oriented, speech and mentation normal  PSYCHE: mentation appears normal, affect and mood normal    ASSESSMENT/PLAN:    Dark urine: UA  DM: hgba1c, lipid due    ALEJANDRINA COTE MD

## 2017-08-31 ENCOUNTER — OFFICE VISIT (OUTPATIENT)
Dept: INFECTIOUS DISEASES | Facility: CLINIC | Age: 52
End: 2017-08-31
Attending: INTERNAL MEDICINE
Payer: COMMERCIAL

## 2017-08-31 VITALS
HEIGHT: 64 IN | DIASTOLIC BLOOD PRESSURE: 78 MMHG | HEART RATE: 93 BPM | WEIGHT: 173.5 LBS | BODY MASS INDEX: 29.62 KG/M2 | SYSTOLIC BLOOD PRESSURE: 121 MMHG | TEMPERATURE: 98.2 F

## 2017-08-31 DIAGNOSIS — F32.9 MAJOR DEPRESSIVE DISORDER, SINGLE EPISODE, UNSPECIFIED: Primary | ICD-10-CM

## 2017-08-31 DIAGNOSIS — Z00.00 PREVENTATIVE HEALTH CARE: ICD-10-CM

## 2017-08-31 DIAGNOSIS — B07.8 PERIUNGUAL WART: ICD-10-CM

## 2017-08-31 DIAGNOSIS — R41.3 MEMORY CHANGE: ICD-10-CM

## 2017-08-31 DIAGNOSIS — B58.2 TOXOPLASMA ENCEPHALITIS (H): ICD-10-CM

## 2017-08-31 DIAGNOSIS — B20 HUMAN IMMUNODEFICIENCY VIRUS (HIV) DISEASE (H): ICD-10-CM

## 2017-08-31 PROCEDURE — 90746 HEPB VACCINE 3 DOSE ADULT IM: CPT | Mod: ZF | Performed by: INTERNAL MEDICINE

## 2017-08-31 PROCEDURE — 90471 IMMUNIZATION ADMIN: CPT | Mod: ZF

## 2017-08-31 PROCEDURE — 99212 OFFICE O/P EST SF 10 MIN: CPT

## 2017-08-31 PROCEDURE — 99212 OFFICE O/P EST SF 10 MIN: CPT | Mod: 25,ZF

## 2017-08-31 PROCEDURE — 25000128 H RX IP 250 OP 636: Mod: ZF | Performed by: INTERNAL MEDICINE

## 2017-08-31 RX ADMIN — HEPATITIS B VACCINE (RECOMBINANT) 20 MCG: 20 INJECTION, SUSPENSION INTRAMUSCULAR at 10:43

## 2017-08-31 ASSESSMENT — PAIN SCALES - GENERAL: PAINLEVEL: NO PAIN (0)

## 2017-08-31 NOTE — MR AVS SNAPSHOT
After Visit Summary   8/31/2017    Andrew Lockwood    MRN: [id number]           Patient Information     Date Of Birth          11/27/1967        Visit Information        Provider Department      8/31/2017 9:00 AM Sharon Rosado MD [facility]        Today's Diagnoses     Major depressive disorder, single episode, unspecified    -  1    Human immunodeficiency virus (HIV) disease (H)        Toxoplasma encephalitis (H)        Preventative health care        Periungual wart        Memory change          Care Instructions    Stop Azithromycin    Continue Bactrim at a dose of 1 tablet twice daily              Follow-ups after your visit        Additional Services     COLORECTAL SURGERY REFERRAL       Your provider has referred you to: [facility] [phone]   [email or web address]    Referral Reason(s): anal pap smear  Special Concerns: None  This referral is: Elective (week +)  It is OK to leave a message on patient's voicemail.    Please be aware that coverage of these services is subject to the terms and limitations of your health insurance plan.  Call member services at your health plan with any benefit or coverage questions.      Please bring the following with you to your appointment:    (1) Any X-Rays, CTs or MRIs which have been performed.  Contact the facility where they were done to arrange for  prior to your scheduled appointment.    (2) List of current medications  (3) This referral request   (4) Any documents/labs given to you for this referral            DERMATOLOGY REFERRAL       Your provider has referred you to: intralesional bleomycin with Dr. Singh at [facility] office    Please fax most recent dermatology note from Dr Becerra on 5/10/2017    Please be aware that coverage of these services is subject to the terms and limitations of your health  insurance plan.  Call member services at your health plan with any benefit or coverage questions.      Please bring the following with you to your appointment:    (1) Any X-Rays, CTs or MRIs which have been performed.  Contact the facility where they were done to arrange for  prior to your scheduled appointment.    (2) List of current medications  (3) This referral request   (4) Any documents/labs given to you for this referral            NEUROPSYCHOLOGY REFERRAL       Your provider has referred you to:    [facility]: Adult Neuropsychology Clinic [facility] [phone] Preferred Provider: Mar[facility]aut[facility]y.D., [facility]   [email or web address]    All scheduling is subject to the client's specific insurance plan & benefits, provider/location availability, and provider clinical specialities.  Please arrive 15 minutes early for your first appointment and bring your completed paperwork.    Please be aware that coverage of these services is subject to the terms and limitations of your health insurance plan.  Call member services at your health plan with any benefit or coverage questions.    Please bring the following to your appointment:  >>   Any x-rays, CTs or MRIs which have been performed.  Contact the facility where they were done to arrange for  prior to your scheduled appointment.  Any new CT, MRI or other procedures ordered by your specialist must be performed at a [facility] facility or coordinated by your clinic's referral office.    >>   List of current medications   >>   This referral request   >>   Any documents/labs given to you for this referral                  Who to contact     If you have questions or need follow up information about today's clinic visit or your schedule please contact [facility] directly at [phone].  Normal or non-critical lab and imaging results will be communicated to you by MyChart, letter or phone within  "4 business days after the clinic has received the results. If you do not hear from us within 7 days, please contact the clinic through [employer] or phone. If you have a critical or abnormal lab result, we will notify you by phone as soon as possible.  Submit refill requests through [employer] or call your pharmacy and they will forward the refill request to us. Please allow 3 business days for your refill to be completed.          Additional Information About Your Visit        [employer] Information     [employer] gives you secure access to your electronic health record. If you see a primary care provider, you can also send messages to your care team and make appointments. If you have questions, please call your primary care clinic.  If you do not have a primary care provider, please call [phone] and they will assist you.        Care EveryWhere ID     This is your Care EveryWhere ID. This could be used by other organizations to access your [place] medical records  [id number]        Your Vitals Were     Pulse Temperature Height BMI (Body Mass Index)          93 98.2  F (36.8  C) (Oral) 1.626 m (5' 4\") 29.78 kg/m2         Blood Pressure from Last 3 Encounters:   08/31/17 121/78   08/11/17 123/86   06/10/17 130/82    Weight from Last 3 Encounters:   08/31/17 78.7 kg (173 lb 8 oz)   08/22/17 80 kg (176 lb 4.8 oz)   08/11/17 78.9 kg (174 lb)              We Performed the Following     COLORECTAL SURGERY REFERRAL     DERMATOLOGY REFERRAL     NEUROPSYCHOLOGY REFERRAL        Primary Care Provider Office Phone # Fax #    Sharon Rosado [phone]       [address] [place] [id number]        Equal Access to Services     [facility] : Hadmagnolia Cody, maverick centeno, qaiker kaaldez weathers. So [facility] [phone].    ATENCIÓN: Si habla español, tiene a kohli disposición servicios gratuitos de asistencia lingüística. Llame al " [phone].    We comply with applicable federal civil rights laws and [place] laws. We do not discriminate on the basis of race, color, national origin, age, disability sex, sexual orientation or gender identity.            Thank you!     Thank you for choosing [facility]  for your care. Our goal is always to provide you with excellent care. Hearing back from our patients is one way we can continue to improve our services. Please take a few minutes to complete the written survey that you may receive in the mail after your visit with us. Thank you!             Your Updated Medication List - Protect others around you: Learn how to safely use, store and throw away your medicines at www.disposemymeds.org.          This list is accurate as of: 8/31/17 11:59 PM.  Always use your most recent med list.                   Brand Name Dispense Instructions for use Diagnosis    albuterol 108 (90 BASE) MCG/ACT Inhaler    PROAIR HFA/PROVENTIL HFA/VENTOLIN HFA    1 Inhaler    Inhale 2 puffs into the lungs every 6 hours as needed for shortness of breath / dyspnea or wheezing    Cough       * AZITHROMYCIN PO      Take 600 mg by mouth once a week        * azithromycin 600 MG tablet    ZITHROMAX    8 tablet    TAKE 2 TABLETS (1200 MG) BY MOUTH ONCE EVERY 7 DAYS    Human immunodeficiency virus (HIV) disease (H)       benzoyl peroxide 5 % Liqd     226 g    Use daily in shower    Folliculitis       blood glucose lancets standard    no brand specified    100 Box    Use to test blood sugar four times daily or as directed.    Type 2 diabetes mellitus without complication, without long-term current use of insulin (H)       blood glucose monitoring meter device kit    no brand specified    1 kit    Use to test blood sugar four times daily or as directed.    Type 2 diabetes mellitus without complication, without long-term current use of insulin (H)       blood glucose monitoring test strip    no brand  specified    100 each    Use to test blood sugars four times daily or as directed    Type 2 diabetes mellitus without complication, without long-term current use of insulin (H)       cyclopentolate 1 % ophthalmic solution    CYCLOGYL    1 Bottle    Place 1 drop Into the left eye 2 times daily    Traumatic iridocyclitis of left eye       diclofenac 1 % Gel topical gel    VOLTAREN    100 g    Apply 4 grams to knees or 2 grams to hands four times daily using enclosed dosing card.    Primary osteoarthritis of both knees       dicyclomine 10 MG capsule    BENTYL    240 capsule    Take 1 capsule (10 mg) by mouth 4 times daily (before meals and nightly) /BEDTIME    Abdominal cramping       Tsrsawa-Nzxlq-Aeskvmug-TenofAF 114-492-111-10 MG Tabs per tablet    GENVOYA    30 tablet    Take 1 tablet by mouth daily    Human immunodeficiency virus (HIV) disease (H)       fluticasone 50 MCG/ACT spray    FLONASE    1 Bottle    Spray 1 spray into both nostrils daily    Abdominal cramping       * gabapentin 300 MG capsule    NEURONTIN    90 capsule    TAKE ONE CAPSULE BY MOUTH ON DAY 1, THEN TAKE ONE CAPSULE TWICE DAILY ON DAY 2 THEN TAKE ONE CAPSULE THREE TIMES A DAY.    Herpes zoster without complication       * gabapentin 300 MG capsule    NEURONTIN    90 capsule    Take 1 capsule (300 mg) by mouth 3 times daily    Herpes zoster without complication       meclizine 25 MG tablet    ANTIVERT    20 tablet    Take 1 tablet (25 mg) by mouth 3 times daily as needed for dizziness    Vertigo       metFORMIN 500 MG tablet    GLUCOPHAGE    120 tablet    TAKE TWO TABLETS BY MOUTH TWICE A DAY WITH MEALS    DM (diabetes mellitus) (H)       minocycline 50 MG capsule    MINOCIN/DYNACIN    60 capsule    Take 1 capsule (50 mg) by mouth 2 times daily    Folliculitis       montelukast 10 MG tablet    SINGULAIR    90 tablet    Take 1 tablet (10 mg) by mouth At Bedtime    Abdominal cramping       * omeprazole 20 MG CR capsule    priLOSEC    60 capsule     TAKE ONE CAPSULE BY MOUTH EVERY DAY    Epigastric pain       * omeprazole 20 MG CR capsule    priLOSEC    90 capsule    TAKE ONE CAPSULE BY MOUTH EVERY DAY    Epigastric pain       polyethylene glycol powder    MIRALAX    527 g    Take 17 g (1 capful) by mouth daily    Constipation       prednisoLONE acetate 1 % ophthalmic susp    PRED FORTE    1 Bottle    Place 1 drop Into the left eye 4 times daily    Traumatic iridocyclitis of left eye       saline 0.65 % (SOLN) Soln    AYR SALINE NASAL DROPS    1 Bottle    Spray 1-2 drops in nostril 2 times daily        sildenafil 50 MG cap/tab    VIAGRA    30 tablet    Take 0.5 tablets (25 mg) by mouth daily as needed for erectile dysfunction    Erectile dysfunction, unspecified erectile dysfunction type       simethicone 125 MG Chew chewable tablet    MYLICON    30 tablet    Take 1 tablet (125 mg) by mouth 4 times daily as needed for intestinal gas        sulfamethoxazole-trimethoprim 800-160 MG per tablet    BACTRIM DS/SEPTRA DS    60 tablet    TAKE 1 TABLET BY MOUTH 2 TIMES DAILY    Toxoplasma encephalitis (H), Human immunodeficiency virus (HIV) disease (H)       * Notice:  This list has 6 medication(s) that are the same as other medications prescribed for you. Read the directions carefully, and ask your doctor or other care provider to review them with you.

## 2017-08-31 NOTE — NURSING NOTE
"Chief Complaint   Patient presents with     RECHECK     follow up B20---Headache       Initial /78  Pulse 93  Temp 98.2  F (36.8  C) (Oral)  Ht 1.626 m (5' 4\")  Wt 78.7 kg (173 lb 8 oz)  BMI 29.78 kg/m2 Estimated body mass index is 29.78 kg/(m^2) as calculated from the following:    Height as of this encounter: 1.626 m (5' 4\").    Weight as of this encounter: 78.7 kg (173 lb 8 oz).  Medication Reconciliation: complete  "

## 2017-08-31 NOTE — LETTER
8/31/2017       RE: Andrew Lockwood  [address]   [facility], [place] [address]     Dear Colleague,    Thank you for referring your patient, Andrew Lockwood, to the [facility] AND INFECTIOUS DISEASES at [facility]. Please see a copy of my visit note below.    [facility] - HIV Progress Note  Dr. Sharon Rosado, [facility], [facility], [address] [place] [address], Sixth Floor, [address]  [place] [address]  Patient:  Andrew Lockwood, Date of birth 11/27/1967, Medical record number [id number]  Date of Visit: Aug 31, 2017         Assessment and Recommendations:     HIV -   Tolerating medications well with good response to Genvoya - virologically suppressed with rising CD4.  His most recent CD4 is 247.  On the basis of this, I think we can stop his Azithromycin prophylaxis. He should continue his Bactrim at this time    Memory issues  Andrew has had a few episodes of forgetfulness in the last week and he gets very concerned about these symptoms given his history of toxoplasmosis.  Given that this is a relatively new concern, is not associated with other neurologic deficits and his CD4 count is not above 200, I am not concerned about opportunistic infections.  I will order neuropsychiatric testing.  I advised him to contact us if he is having worsening symptoms.    Rash and Periungual Warts  He continues to have some lesions consistent with folliculitis on his arms.  He has also been closely followed by dermatology and they recommended that he see Dr. Singh at [facility] for intralesional bleomycin for his periungual warts.  I placed this referral.     DMII   Not addressed today.     Erectile dysfunction  Not addressed today    Preventative health care  Cardiovascular Juan Jose -               Lipids - CHOL 180, LDL 72 (8/18/2016)              Blood Pressure -  /78  today  Cancer Screening              Colon - Up to date. [facility]   Anal - Previous anal pap with insufficient cells.  Will refer to colorectal.  He had a lot of anxiety and discomfort with that initial sampling.   Immunizations              Hepatitis A - immune, serology 4/13/15              Hepatitis B - 6/16/2016, 3/10/2016, due for third              Influenza - Up To date              Pneumovax/Prevnar - Up to date               Tdap - 6/8/14 per [facility]              Menactra - Up to date.    Renal Health -  UA without proteinuria and creatinine normal in 12/2016.    Sexually Transmitted Infection Risk and Screening              Gonorrhea and Chlamydia - GC/chlamydia negative on 3/2017              Syphilis - negative 3/2017    I spent more than 40 minutes today in face to fact time managing the care of Andrew Lockwood.  Over 50% of my time on the unit was spent counseling the patient and/or coordinating care regarding services listed in this note.    Sharon Rosado MD  Division of Infectious Diseases and International Medicine  [phone]         History of Infectious Disease Illness:   Mr. Lockwood is a 49 year old man who I follow for HIV. He comes in for routine follow up. He is doing well with his HIV meds.  High level of adherence.  No nausea or vomiting with these meds - he has had ongoing issues with indigestion.  He has had some concerns that recently he has memory issues.   We talked extensively today about his current housing - [facility].  He strongly feels that he does not want to live there any more.  He reports that there is too much sex between residents and there are several people that deal drugs within the facility.  Indeed, Mr. Lockwood had never had issues with illicit drugs and ended up doing injection drugs for the first time since in [facility].  He has a plan for a deposit and is currently looking for a new apartment.  I brought in Simon Cadena for this conversation as well. Our  primary concern has been that last year, Andrew had a bout of homelessness that was very hard on him emotionally and physically.  We encouraged him to have a place set up before he leave [facility]. Physically he is doing okay.  He does have occasional lesions on his arms that are consistent with his cellulitis.  He reports that his primary dermatologist recommended that he see someone that can do intralesional bleomycin injections for his hands.  He denies any fevers or chills.         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 They did not live together.  Historical use of ARVs: Triumeq, Genvoya  Historical Resistance Mutations: Susceptible        Past Medical and Surgical History:     Past Medical History:   Diagnosis Date     AIDS (H)      Allergic rhinitis due to other allergen     DNS     Chronic abdominal pain      CNS toxoplasmosis (H)      Diabetes type 2, controlled (H)      GERD (gastroesophageal reflux disease)      HIV (human immunodeficiency virus infection) (H)      Periungual wart      Sleep apnea     doesn't use CPAP       Past Surgical History:   Procedure Laterality Date     C NONSPECIFIC PROCEDURE      right forearm fracture     COLONOSCOPY Left 1/22/2016    Procedure: COMBINED COLONOSCOPY, SINGLE OR MULTIPLE BIOPSY/POLYPECTOMY BY BIOPSY;  Surgeon: Clark Saini MD;  Location:  GI     HC EXPLORE UNDESC TESTIS,INGUIN/SCROTAL       LAPAROSCOPY DIAGNOSTIC (GENERAL) N/A 7/26/2016    Procedure: LAPAROSCOPY DIAGNOSTIC (GENERAL);  Surgeon: Susannah Arriaga MD;  Location:  OR     OPTICAL TRACKING SYSTEM CRANIOTOMY,  EXCISE TUMOR, COMBINED Left 4/10/2015    Procedure: COMBINED OPTICAL TRACKING SYSTEM CRANIOTOMY, EXCISE TUMOR;  Surgeon: Mirlande Colmenares MD;  Location: [facility] OR     REPAIR GAMEKEEPER'S THUMB Right 12/2/2016    Procedure: REPAIR LIGAMENT ULNAR COLLATERAL THUMB (GAMEKEEPER'S);  Surgeon: Evin Zamorano MD;  Location: [facility] OR           Family History:     Family History   Problem Relation Age of Onset     DIABETES Brother      DIABETES Father      Alzheimer Disease Father      Unknown/Adopted Mother      DIABETES Paternal Grandfather      CANCER No family hx of      no skin cancer     Skin Cancer No family hx of      no famiy hx of skin cancer           Social History:     Social History   Substance Use Topics     Smoking status: Current Every Day Smoker     Packs/day: 0.25     Last attempt to quit: 10/28/2016     Smokeless tobacco: Former User      Comment: just smoked very rarely/socailly in the past     Alcohol use No      Comment: Last etoh in 2007     Social History     Social History Narrative    Born in [facility].  [place]ame [place] the [place] in [place].  Last traveled to visit family in 2008.                 Review of Systems:   CONSTITUTIONAL: negative fevers or chills   EYES: negative for icterus, vision is stable  ENT: negative for hearing loss, sore throat   RESPIRATORY: negative for cough, sputum or dyspnea   CARDIOVASCULAR: negative for chest pain, palpitations   GASTROINTESTINAL: negative for nausea, vomiting, diarrhea  GENITOURINARY: negative for dysuria, no genital lesions   HEME: No easy bruising   INTEGUMENT: negative for or pruritus, + papules on upper extremities  NEURO: Negative for headache         Current Medications:     Current Outpatient Prescriptions   Medication Sig Dispense Refill     diclofenac (VOLTAREN) 1 % GEL topical gel Apply 4 grams to knees or 2 grams to hands four times daily using enclosed dosing card. 100 g 1     azithromycin (ZITHROMAX) 600 MG tablet TAKE 2 TABLETS (1200 MG) BY  MOUTH ONCE EVERY 7 DAYS 8 tablet 11     sulfamethoxazole-trimethoprim (BACTRIM DS/SEPTRA DS) 800-160 MG per tablet TAKE 1 TABLET BY MOUTH 2 TIMES DAILY 60 tablet 5     dicyclomine (BENTYL) 10 MG capsule Take 1 capsule (10 mg) by mouth 4 times daily (before meals and nightly) /BEDTIME 240 capsule 0     montelukast (SINGULAIR) 10 MG tablet Take 1 tablet (10 mg) by mouth At Bedtime 90 tablet 2     gabapentin (NEURONTIN) 300 MG capsule Take 1 capsule (300 mg) by mouth 3 times daily 90 capsule 5     omeprazole (PRILOSEC) 20 MG CR capsule TAKE ONE CAPSULE BY MOUTH EVERY DAY 90 capsule 1     GENVOYA [id number] mg tablet Take 1 tablet by mouth daily 30 tablet 5     polyethylene glycol (MIRALAX) powder Take 17 g (1 capful) by mouth daily 527 g 0     metFORMIN (GLUCOPHAGE) 500 MG tablet TAKE TWO TABLETS BY MOUTH TWICE A DAY WITH MEALS 120 tablet 3     omeprazole (PRILOSEC) 20 MG CR capsule TAKE ONE CAPSULE BY MOUTH EVERY DAY 60 capsule 3     simethicone (MYLICON) 125 MG CHEW chewable tablet Take 1 tablet (125 mg) by mouth 4 times daily as needed for intestinal gas 30 tablet 0     blood glucose monitoring (NO BRAND SPECIFIED) meter device kit Use to test blood sugar four times daily or as directed. 1 kit 0     blood glucose monitoring (NO BRAND SPECIFIED) test strip Use to test blood sugars four times daily or as directed 100 each 5     blood glucose (NO BRAND SPECIFIED) lancets standard Use to test blood sugar four times daily or as directed. 100 Box 5     meclizine (ANTIVERT) 25 MG tablet Take 1 tablet (25 mg) by mouth 3 times daily as needed for dizziness 20 tablet 1     cyclopentolate (CYCLOGYL) 1 % ophthalmic solution Place 1 drop Into the left eye 2 times daily 1 Bottle 0     prednisoLONE acetate (PRED FORTE) 1 % ophthalmic susp Place 1 drop Into the left eye 4 times daily 1 Bottle 0     saline (AYR SALINE NASAL DROPS) 0.65 % (SOLN) SOLN Spray 1-2 drops in nostril 2 times daily 1 Bottle 0     minocycline  "(MINOCIN/DYNACIN) 50 MG capsule Take 1 capsule (50 mg) by mouth 2 times daily 60 capsule 0     benzoyl peroxide 5 % LIQD Use daily in shower 226 g 11     fluticasone (FLONASE) 50 MCG/ACT spray Spray 1 spray into both nostrils daily 1 Bottle 1     albuterol (PROAIR HFA/PROVENTIL HFA/VENTOLIN HFA) 108 (90 BASE) MCG/ACT Inhaler Inhale 2 puffs into the lungs every 6 hours as needed for shortness of breath / dyspnea or wheezing 1 Inhaler 0     sildenafil (VIAGRA) 50 MG cap/tab Take 0.5 tablets (25 mg) by mouth daily as needed for erectile dysfunction 30 tablet 0     gabapentin (NEURONTIN) 300 MG capsule TAKE ONE CAPSULE BY MOUTH ON DAY 1, THEN TAKE ONE CAPSULE TWICE DAILY ON DAY 2 THEN TAKE ONE CAPSULE THREE TIMES A DAY. 90 capsule 3     AZITHROMYCIN PO Take 600 mg by mouth once a week              Immunization History:     Immunization History   Administered Date(s) Administered     HepB-Peds 03/10/2016, 06/16/2016     Influenza (IIV3) 10/17/2016     Influenza Vaccine IM 3yrs+ 4 Valent IIV4 12/22/2015     Mantoux 04/12/2015     Meningococcal (Menactra ) 08/31/2015, 03/10/2016     Pneumococcal (PCV 13) 03/10/2016     Pneumococcal 23 valent 06/16/2016     TDAP Vaccine (Boostrix) 10/17/2016            Allergies:     Allergies   Allergen Reactions     Milk [Lac Bovis] Hives     Tylenol [Acetaminophen] Itching            Physical Exam:   /78  Pulse 93  Temp 98.2  F (36.8  C) (Oral)  Ht 1.626 m (5' 4\")  Wt 78.7 kg (173 lb 8 oz)  BMI 29.78 kg/m2    Physical Examination:         Physical Exam:   /78  Pulse 93  Temp 98.2  F (36.8  C) (Oral)  Ht 1.626 m (5' 4\")  Wt 78.7 kg (173 lb 8 oz)  BMI 29.78 kg/m2  Physical Examination:  GENERAL:  well-developed, well-nourished, seated in no acute distress.  HEENT:  Head is normocephalic, atraumatic   EYES:  Eyes have anicteric sclerae without conjunctival injection   ENT:  Oropharynx is moist without exudates or ulcers. Tongue is midline  NECK:  Supple. No cervical " lymphadenopathy  LUNGS:  Clear to auscultation bilateral.   CARDIOVASCULAR:  Regular rate and rhythm with no murmurs, gallops or rubs.  ABDOMEN:  Normal bowel sounds, soft, nontender.   SKIN:  No acute rashes. Warts surrounding nails on bilateral hands again appear much smaller than in the past.   NEUROLOGIC:  Grossly nonfocal. Active x4 extremities          Laboratory Data:     CD4 Count  Absolute CD4   Date Value Ref Range Status   08/11/2017 247 (L) 441 - 2156 cells/uL Final     CD4 Malcolm T   Date Value Ref Range Status   08/11/2017 10 (L) 28 - 63 % Final     CD4:CD8 Ratio   Date Value Ref Range Status   08/11/2017 0.37 (L) 1.40 - 2.60 Final       HIV-1 RNA Quantitative:  HIV-1 RNA Quant Result   Date Value Ref Range Status   08/11/2017 HIV-1 RNA Not Detected HIVND^HIV-1 RNA Not Detected [Copies]/mL Final     Comment:     The DAMON AmpliPrep/DAMON TaqMan HIV-1 test is an FDA-approved in vitro   nucleic acid amplification test for the quantitation of HIV-1 RNA in human   plasma (EDTA plasma) using the DAMON AmpliPrep instrument for automated viral   nucleic acid extraction and the DAMON TaqMan Analyzer or DAMON TaqMan for   automated Real Time PCR amplification and detection of the viral nucleic acid   target.  Titer results are reported in copies/ml. This assay is intended for use in   conjunction with clinical presentation and other laboratory markers of disease   prognosis and for use as an aid in assessing viral response to antiretroviral   treatment as measured by changes in plasma HIV-1 RNA levels. This test should   not be used as a donor screening test to confirm the presence of HIV-1   infection.         Metabolic Studies       Sodium   Date Value Ref Range Status   08/11/2017 136 133 - 144 mmol/L Final   06/10/2017 139 133 - 144 mmol/L Final     Potassium   Date Value Ref Range Status   08/11/2017 3.4 3.4 - 5.3 mmol/L Final   06/10/2017 3.5 3.4 - 5.3 mmol/L Final     Chloride   Date Value Ref Range  Status   08/11/2017 103 94 - 109 mmol/L Final   06/10/2017 106 94 - 109 mmol/L Final     Carbon Dioxide   Date Value Ref Range Status   08/11/2017 23 20 - 32 mmol/L Final   06/10/2017 24 20 - 32 mmol/L Final     Anion Gap   Date Value Ref Range Status   08/11/2017 10 3 - 14 mmol/L Final   06/10/2017 9 3 - 14 mmol/L Final     Urea Nitrogen   Date Value Ref Range Status   08/11/2017 8 7 - 30 mg/dL Final   06/10/2017 10 7 - 30 mg/dL Final     Creatinine   Date Value Ref Range Status   08/11/2017 0.93 0.66 - 1.25 mg/dL Final   06/10/2017 0.74 0.66 - 1.25 mg/dL Final     GFR Estimate   Date Value Ref Range Status   08/11/2017 86 >60 mL/min/1.7m2 Final     Comment:     Non  GFR Calc   06/10/2017 >90  Non  GFR Calc   >60 mL/min/1.7m2 Final     Glucose   Date Value Ref Range Status   08/11/2017 98 70 - 99 mg/dL Final   06/10/2017 128 (H) 70 - 99 mg/dL Final     Hemoglobin A1C   Date Value Ref Range Status   08/11/2017 6.0 4.3 - 6.0 % Final     Calcium   Date Value Ref Range Status   08/11/2017 9.3 8.5 - 10.1 mg/dL Final   06/10/2017 10.4 (H) 8.5 - 10.1 mg/dL Final     Phosphorus   Date Value Ref Range Status   12/26/2016 2.9 2.5 - 4.5 mg/dL Final     Magnesium   Date Value Ref Range Status   12/28/2016 2.0 1.6 - 2.3 mg/dL Final   12/26/2016 1.9 1.6 - 2.3 mg/dL Final     Lactic Acid   Date Value Ref Range Status   04/06/2017 0.8 0.7 - 2.1 mmol/L Final   12/26/2016 1.6 0.7 - 2.1 mmol/L Final       Hepatic Studies    Bilirubin Total   Date Value Ref Range Status   08/11/2017 0.5 0.2 - 1.3 mg/dL Final   06/10/2017 0.6 0.2 - 1.3 mg/dL Final     Alkaline Phosphatase   Date Value Ref Range Status   08/11/2017 107 40 - 150 U/L Final   06/10/2017 80 40 - 150 U/L Final     Albumin   Date Value Ref Range Status   08/11/2017 4.0 3.4 - 5.0 g/dL Final   06/10/2017 3.5 3.4 - 5.0 g/dL Final     AST   Date Value Ref Range Status   08/11/2017 18 0 - 45 U/L Final   06/10/2017 15 0 - 45 U/L Final     ALT   Date  Value Ref Range Status   08/11/2017 23 0 - 70 U/L Final   06/10/2017 22 0 - 70 U/L Final       Hematology Studies      WBC   Date Value Ref Range Status   08/11/2017 7.9 4.0 - 11.0 10e9/L Final   06/10/2017 8.0 4.0 - 11.0 10e9/L Final     Absolute Neutrophil   Date Value Ref Range Status   08/11/2017 4.3 1.6 - 8.3 10e9/L Final   06/10/2017 4.6 1.6 - 8.3 10e9/L Final     Absolute Lymphocytes   Date Value Ref Range Status   08/11/2017 2.7 0.8 - 5.3 10e9/L Final   06/10/2017 2.5 0.8 - 5.3 10e9/L Final     Absolute Monocytes   Date Value Ref Range Status   08/11/2017 0.5 0.0 - 1.3 10e9/L Final   06/10/2017 0.5 0.0 - 1.3 10e9/L Final     Absolute Eosinophils   Date Value Ref Range Status   08/11/2017 0.3 0.0 - 0.7 10e9/L Final   06/10/2017 0.4 0.0 - 0.7 10e9/L Final     Hemoglobin   Date Value Ref Range Status   08/11/2017 14.5 13.3 - 17.7 g/dL Final   06/10/2017 13.7 13.3 - 17.7 g/dL Final     Hematocrit   Date Value Ref Range Status   08/11/2017 41.5 40.0 - 53.0 % Final   06/10/2017 41.0 40.0 - 53.0 % Final     Platelet Count   Date Value Ref Range Status   08/11/2017 251 150 - 450 10e9/L Final   06/10/2017 259 150 - 450 10e9/L Final       Hepatitis B Testing     Recent Labs   Lab Test  04/13/15   0816   HBCAB  Nonreactive   HEPBANG  Nonreactive   HBCM  Nonreactive   A nonreactive result suggests lack of recent exposure to the virus in the   preceding 6 months.     HBEABY  Negative  Reference range: Negative  (Note)  Performed by [facility],  [address] [place] [id number] [phone]  [email or web address], Nayan Corley MD, Lab. Director     HBEAGN  Negative  Reference range: Negative  (Note)  Performed by [facility],  [address], [facility] [place] [address] [phone]  [email or web address] [employer], Nayan Corley MD, Lab. Director         Hepatitis C Testing     Hepatitis C Antibody   Date Value Ref Range Status   03/02/2017  NR Final    Nonreactive   Assay performance characteristics have not been established for  newborns,   infants, and children     04/13/2015  NR Final    Nonreactive   Assay performance characteristics have not been established for newborns,   infants, and children       Imaging:  No results found for this or any previous visit (from the past 744 hour(s)).    Sharon Rosado MD

## 2017-08-31 NOTE — PROGRESS NOTES
[facility] - HIV Progress Note  Dr. Sharon Rosado, [facility], [facility], [address] [place] [address], Sixth Floor, [address]  [place] [address]  Patient:  Andrew Lockwood, Date of birth 11/27/1967, Medical record number [id number]  Date of Visit: Aug 31, 2017         Assessment and Recommendations:     HIV -   Tolerating medications well with good response to Genvoya - virologically suppressed with rising CD4.  His most recent CD4 is 247.  On the basis of this, I think we can stop his Azithromycin prophylaxis. He should continue his Bactrim at this time    Memory issues  Andrew has had a few episodes of forgetfulness in the last week and he gets very concerned about these symptoms given his history of toxoplasmosis.  Given that this is a relatively new concern, is not associated with other neurologic deficits and his CD4 count is not above 200, I am not concerned about opportunistic infections.  I will order neuropsychiatric testing.  I advised him to contact us if he is having worsening symptoms.    Rash and Periungual Warts  He continues to have some lesions consistent with folliculitis on his arms.  He has also been closely followed by dermatology and they recommended that he see Dr. Singh at [facility] for intralesional bleomycin for his periungual warts.  I placed this referral.     DMII   Not addressed today.     Erectile dysfunction  Not addressed today    Preventative health care  Cardiovascular Juan Jose -               Lipids - CHOL 180, LDL 72 (8/18/2016)              Blood Pressure -  /78 today  Cancer Screening              Colon - Up to date. [facility]   Anal - Previous anal pap with insufficient cells.  Will refer to colorectal.  He had a lot of anxiety and discomfort with that initial sampling.   Immunizations              Hepatitis A - immune, serology 4/13/15              Hepatitis B -  6/16/2016, 3/10/2016, due for third              Influenza - Up To date              Pneumovax/Prevnar - Up to date               Tdap - 6/8/14 per [facility]              Menactra - Up to date.    Renal Health -  UA without proteinuria and creatinine normal in 12/2016.    Sexually Transmitted Infection Risk and Screening              Gonorrhea and Chlamydia - GC/chlamydia negative on 3/2017              Syphilis - negative 3/2017    I spent more than 40 minutes today in face to fact time managing the care of Andrew Lockwood.  Over 50% of my time on the unit was spent counseling the patient and/or coordinating care regarding services listed in this note.    Sharon Rosado MD  Division of Infectious Diseases and International Medicine  [phone]         History of Infectious Disease Illness:   Mr. Lockwood is a 49 year old man who I follow for HIV. He comes in for routine follow up. He is doing well with his HIV meds.  High level of adherence.  No nausea or vomiting with these meds - he has had ongoing issues with indigestion.  He has had some concerns that recently he has memory issues.   We talked extensively today about his current housing - [facility].  He strongly feels that he does not want to live there any more.  He reports that there is too much sex between residents and there are several people that deal drugs within the facility.  Indeed, Mr. Lockwood had never had issues with illicit drugs and ended up doing injection drugs for the first time since in [facility].  He has a plan for a deposit and is currently looking for a new apartment.  I brought in Simon Cadena for this conversation as well. Our primary concern has been that last year, Andrew had a bout of homelessness that was very hard on him emotionally and physically.  We encouraged him to have a place set up before he leave [facility]. Physically he is doing okay.  He does have occasional lesions on his arms that are consistent with his  cellulitis.  He reports that his primary dermatologist recommended that he see someone that can do intralesional bleomycin injections for his hands.  He denies any fevers or chills.         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 They did not live together.  Historical use of ARVs: Triumeq, Genvoya  Historical Resistance Mutations: Susceptible        Past Medical and Surgical History:     Past Medical History:   Diagnosis Date     AIDS (H)      Allergic rhinitis due to other allergen     DNS     Chronic abdominal pain      CNS toxoplasmosis (H)      Diabetes type 2, controlled (H)      GERD (gastroesophageal reflux disease)      HIV (human immunodeficiency virus infection) (H)      Periungual wart      Sleep apnea     doesn't use CPAP       Past Surgical History:   Procedure Laterality Date     C NONSPECIFIC PROCEDURE      right forearm fracture     COLONOSCOPY Left 1/22/2016    Procedure: COMBINED COLONOSCOPY, SINGLE OR MULTIPLE BIOPSY/POLYPECTOMY BY BIOPSY;  Surgeon: Clark Saini MD;  Location:  GI     HC EXPLORE UNDESC TESTIS,INGUIN/SCROTAL       LAPAROSCOPY DIAGNOSTIC (GENERAL) N/A 7/26/2016    Procedure: LAPAROSCOPY DIAGNOSTIC (GENERAL);  Surgeon: Susannah Arriaga MD;  Location:  OR     OPTICAL TRACKING SYSTEM CRANIOTOMY, EXCISE TUMOR, COMBINED Left 4/10/2015    Procedure: COMBINED OPTICAL TRACKING SYSTEM CRANIOTOMY, EXCISE TUMOR;  Surgeon: Mirlande Colmenares MD;  Location: [facility] OR     REPAIR GAMEKEEPER'S THUMB Right 12/2/2016    Procedure: REPAIR LIGAMENT ULNAR COLLATERAL THUMB (GAMEKEEPER'S);  Surgeon: Evin Zamorano  MD Samuel;  Location: [facility] OR           Family History:     Family History   Problem Relation Age of Onset     DIABETES Brother      DIABETES Father      Alzheimer Disease Father      Unknown/Adopted Mother      DIABETES Paternal Grandfather      CANCER No family hx of      no skin cancer     Skin Cancer No family hx of      no famiy hx of skin cancer           Social History:     Social History   Substance Use Topics     Smoking status: Current Every Day Smoker     Packs/day: 0.25     Last attempt to quit: 10/28/2016     Smokeless tobacco: Former User      Comment: just smoked very rarely/socailly in the past     Alcohol use No      Comment: Last etoh in 2007     Social History     Social History Narrative    Born in [facility].  [place]ame to the [place] in [place].  Last traveled to visit family in 2008.                 Review of Systems:   CONSTITUTIONAL: negative fevers or chills   EYES: negative for icterus, vision is stable  ENT: negative for hearing loss, sore throat   RESPIRATORY: negative for cough, sputum or dyspnea   CARDIOVASCULAR: negative for chest pain, palpitations   GASTROINTESTINAL: negative for nausea, vomiting, diarrhea  GENITOURINARY: negative for dysuria, no genital lesions   HEME: No easy bruising   INTEGUMENT: negative for or pruritus, + papules on upper extremities  NEURO: Negative for headache         Current Medications:     Current Outpatient Prescriptions   Medication Sig Dispense Refill     diclofenac (VOLTAREN) 1 % GEL topical gel Apply 4 grams to knees or 2 grams to hands four times daily using enclosed dosing card. 100 g 1     azithromycin (ZITHROMAX) 600 MG tablet TAKE 2 TABLETS (1200 MG) BY MOUTH ONCE EVERY 7 DAYS 8 tablet 11     sulfamethoxazole-trimethoprim (BACTRIM DS/SEPTRA DS) 800-160 MG per tablet TAKE 1 TABLET BY MOUTH 2 TIMES DAILY 60 tablet 5     dicyclomine (BENTYL) 10 MG capsule Take 1 capsule (10 mg) by mouth 4 times daily (before meals and nightly) /BEDTIME 240 capsule 0      montelukast (SINGULAIR) 10 MG tablet Take 1 tablet (10 mg) by mouth At Bedtime 90 tablet 2     gabapentin (NEURONTIN) 300 MG capsule Take 1 capsule (300 mg) by mouth 3 times daily 90 capsule 5     omeprazole (PRILOSEC) 20 MG CR capsule TAKE ONE CAPSULE BY MOUTH EVERY DAY 90 capsule 1     GENVOYA [phone] mg tablet Take 1 tablet by mouth daily 30 tablet 5     polyethylene glycol (MIRALAX) powder Take 17 g (1 capful) by mouth daily 527 g 0     metFORMIN (GLUCOPHAGE) 500 MG tablet TAKE TWO TABLETS BY MOUTH TWICE A DAY WITH MEALS 120 tablet 3     omeprazole (PRILOSEC) 20 MG CR capsule TAKE ONE CAPSULE BY MOUTH EVERY DAY 60 capsule 3     simethicone (MYLICON) 125 MG CHEW chewable tablet Take 1 tablet (125 mg) by mouth 4 times daily as needed for intestinal gas 30 tablet 0     blood glucose monitoring (NO BRAND SPECIFIED) meter device kit Use to test blood sugar four times daily or as directed. 1 kit 0     blood glucose monitoring (NO BRAND SPECIFIED) test strip Use to test blood sugars four times daily or as directed 100 each 5     blood glucose (NO BRAND SPECIFIED) lancets standard Use to test blood sugar four times daily or as directed. 100 Box 5     meclizine (ANTIVERT) 25 MG tablet Take 1 tablet (25 mg) by mouth 3 times daily as needed for dizziness 20 tablet 1     cyclopentolate (CYCLOGYL) 1 % ophthalmic solution Place 1 drop Into the left eye 2 times daily 1 Bottle 0     prednisoLONE acetate (PRED FORTE) 1 % ophthalmic susp Place 1 drop Into the left eye 4 times daily 1 Bottle 0     saline (AYR SALINE NASAL DROPS) 0.65 % (SOLN) SOLN Spray 1-2 drops in nostril 2 times daily 1 Bottle 0     minocycline (MINOCIN/DYNACIN) 50 MG capsule Take 1 capsule (50 mg) by mouth 2 times daily 60 capsule 0     benzoyl peroxide 5 % LIQD Use daily in shower 226 g 11     fluticasone (FLONASE) 50 MCG/ACT spray Spray 1 spray into both nostrils daily 1 Bottle 1     albuterol (PROAIR HFA/PROVENTIL HFA/VENTOLIN HFA) 108 (90 BASE)  "MCG/ACT Inhaler Inhale 2 puffs into the lungs every 6 hours as needed for shortness of breath / dyspnea or wheezing 1 Inhaler 0     sildenafil (VIAGRA) 50 MG cap/tab Take 0.5 tablets (25 mg) by mouth daily as needed for erectile dysfunction 30 tablet 0     gabapentin (NEURONTIN) 300 MG capsule TAKE ONE CAPSULE BY MOUTH ON DAY 1, THEN TAKE ONE CAPSULE TWICE DAILY ON DAY 2 THEN TAKE ONE CAPSULE THREE TIMES A DAY. 90 capsule 3     AZITHROMYCIN PO Take 600 mg by mouth once a week              Immunization History:     Immunization History   Administered Date(s) Administered     HepB-Peds 03/10/2016, 06/16/2016     Influenza (IIV3) 10/17/2016     Influenza Vaccine IM 3yrs+ 4 Valent IIV4 12/22/2015     Mantoux 04/12/2015     Meningococcal (Menactra ) 08/31/2015, 03/10/2016     Pneumococcal (PCV 13) 03/10/2016     Pneumococcal 23 valent 06/16/2016     TDAP Vaccine (Boostrix) 10/17/2016            Allergies:     Allergies   Allergen Reactions     Milk [Lac Bovis] Hives     Tylenol [Acetaminophen] Itching            Physical Exam:   /78  Pulse 93  Temp 98.2  F (36.8  C) (Oral)  Ht 1.626 m (5' 4\")  Wt 78.7 kg (173 lb 8 oz)  BMI 29.78 kg/m2    Physical Examination:         Physical Exam:   /78  Pulse 93  Temp 98.2  F (36.8  C) (Oral)  Ht 1.626 m (5' 4\")  Wt 78.7 kg (173 lb 8 oz)  BMI 29.78 kg/m2  Physical Examination:  GENERAL:  well-developed, well-nourished, seated in no acute distress.  HEENT:  Head is normocephalic, atraumatic   EYES:  Eyes have anicteric sclerae without conjunctival injection   ENT:  Oropharynx is moist without exudates or ulcers. Tongue is midline  NECK:  Supple. No cervical lymphadenopathy  LUNGS:  Clear to auscultation bilateral.   CARDIOVASCULAR:  Regular rate and rhythm with no murmurs, gallops or rubs.  ABDOMEN:  Normal bowel sounds, soft, nontender.   SKIN:  No acute rashes. Warts surrounding nails on bilateral hands again appear much smaller than in the past.   NEUROLOGIC:  " Grossly nonfocal. Active x4 extremities          Laboratory Data:     CD4 Count  Absolute CD4   Date Value Ref Range Status   08/11/2017 247 (L) 441 - 2156 cells/uL Final     CD4 Stewardson T   Date Value Ref Range Status   08/11/2017 10 (L) 28 - 63 % Final     CD4:CD8 Ratio   Date Value Ref Range Status   08/11/2017 0.37 (L) 1.40 - 2.60 Final       HIV-1 RNA Quantitative:  HIV-1 RNA Quant Result   Date Value Ref Range Status   08/11/2017 HIV-1 RNA Not Detected HIVND^HIV-1 RNA Not Detected [Copies]/mL Final     Comment:     The DAMON AmpliPrep/DAMON TaqMan HIV-1 test is an FDA-approved in vitro   nucleic acid amplification test for the quantitation of HIV-1 RNA in human   plasma (EDTA plasma) using the DAMON AmpliPrep instrument for automated viral   nucleic acid extraction and the DAMON TaqMan Analyzer or [employer] TaqMan for   automated Real Time PCR amplification and detection of the viral nucleic acid   target.  Titer results are reported in copies/ml. This assay is intended for use in   conjunction with clinical presentation and other laboratory markers of disease   prognosis and for use as an aid in assessing viral response to antiretroviral   treatment as measured by changes in plasma HIV-1 RNA levels. This test should   not be used as a donor screening test to confirm the presence of HIV-1   infection.         Metabolic Studies       Sodium   Date Value Ref Range Status   08/11/2017 136 133 - 144 mmol/L Final   06/10/2017 139 133 - 144 mmol/L Final     Potassium   Date Value Ref Range Status   08/11/2017 3.4 3.4 - 5.3 mmol/L Final   06/10/2017 3.5 3.4 - 5.3 mmol/L Final     Chloride   Date Value Ref Range Status   08/11/2017 103 94 - 109 mmol/L Final   06/10/2017 106 94 - 109 mmol/L Final     Carbon Dioxide   Date Value Ref Range Status   08/11/2017 23 20 - 32 mmol/L Final   06/10/2017 24 20 - 32 mmol/L Final     Anion Gap   Date Value Ref Range Status   08/11/2017 10 3 - 14 mmol/L Final   06/10/2017 9 3 - 14 mmol/L  Final     Urea Nitrogen   Date Value Ref Range Status   08/11/2017 8 7 - 30 mg/dL Final   06/10/2017 10 7 - 30 mg/dL Final     Creatinine   Date Value Ref Range Status   08/11/2017 0.93 0.66 - 1.25 mg/dL Final   06/10/2017 0.74 0.66 - 1.25 mg/dL Final     GFR Estimate   Date Value Ref Range Status   08/11/2017 86 >60 mL/min/1.7m2 Final     Comment:     Non  GFR Calc   06/10/2017 >90  Non  GFR Calc   >60 mL/min/1.7m2 Final     Glucose   Date Value Ref Range Status   08/11/2017 98 70 - 99 mg/dL Final   06/10/2017 128 (H) 70 - 99 mg/dL Final     Hemoglobin A1C   Date Value Ref Range Status   08/11/2017 6.0 4.3 - 6.0 % Final     Calcium   Date Value Ref Range Status   08/11/2017 9.3 8.5 - 10.1 mg/dL Final   06/10/2017 10.4 (H) 8.5 - 10.1 mg/dL Final     Phosphorus   Date Value Ref Range Status   12/26/2016 2.9 2.5 - 4.5 mg/dL Final     Magnesium   Date Value Ref Range Status   12/28/2016 2.0 1.6 - 2.3 mg/dL Final   12/26/2016 1.9 1.6 - 2.3 mg/dL Final     Lactic Acid   Date Value Ref Range Status   04/06/2017 0.8 0.7 - 2.1 mmol/L Final   12/26/2016 1.6 0.7 - 2.1 mmol/L Final       Hepatic Studies    Bilirubin Total   Date Value Ref Range Status   08/11/2017 0.5 0.2 - 1.3 mg/dL Final   06/10/2017 0.6 0.2 - 1.3 mg/dL Final     Alkaline Phosphatase   Date Value Ref Range Status   08/11/2017 107 40 - 150 U/L Final   06/10/2017 80 40 - 150 U/L Final     Albumin   Date Value Ref Range Status   08/11/2017 4.0 3.4 - 5.0 g/dL Final   06/10/2017 3.5 3.4 - 5.0 g/dL Final     AST   Date Value Ref Range Status   08/11/2017 18 0 - 45 U/L Final   06/10/2017 15 0 - 45 U/L Final     ALT   Date Value Ref Range Status   08/11/2017 23 0 - 70 U/L Final   06/10/2017 22 0 - 70 U/L Final       Hematology Studies      WBC   Date Value Ref Range Status   08/11/2017 7.9 4.0 - 11.0 10e9/L Final   06/10/2017 8.0 4.0 - 11.0 10e9/L Final     Absolute Neutrophil   Date Value Ref Range Status   08/11/2017 4.3 1.6 - 8.3  10e9/L Final   06/10/2017 4.6 1.6 - 8.3 10e9/L Final     Absolute Lymphocytes   Date Value Ref Range Status   08/11/2017 2.7 0.8 - 5.3 10e9/L Final   06/10/2017 2.5 0.8 - 5.3 10e9/L Final     Absolute Monocytes   Date Value Ref Range Status   08/11/2017 0.5 0.0 - 1.3 10e9/L Final   06/10/2017 0.5 0.0 - 1.3 10e9/L Final     Absolute Eosinophils   Date Value Ref Range Status   08/11/2017 0.3 0.0 - 0.7 10e9/L Final   06/10/2017 0.4 0.0 - 0.7 10e9/L Final     Hemoglobin   Date Value Ref Range Status   08/11/2017 14.5 13.3 - 17.7 g/dL Final   06/10/2017 13.7 13.3 - 17.7 g/dL Final     Hematocrit   Date Value Ref Range Status   08/11/2017 41.5 40.0 - 53.0 % Final   06/10/2017 41.0 40.0 - 53.0 % Final     Platelet Count   Date Value Ref Range Status   08/11/2017 251 150 - 450 10e9/L Final   06/10/2017 259 150 - 450 10e9/L Final       Hepatitis B Testing     Recent Labs   Lab Test  04/13/15   0816   HBCAB  Nonreactive   HEPBANG  Nonreactive   HBCM  Nonreactive   A nonreactive result suggests lack of recent exposure to the virus in the   preceding 6 months.     HBEABY  Negative  Reference range: Negative  (Note)  Performed by [facility],  [address] [place] [address] [phone]  [email or web address], Nayan Corley MD, Lab. Director     HBEAGN  Negative  Reference range: Negative  (Note)  Performed by [facility],  [address] [place] [address] [phone]  [email or web address], Nayan Corley MD, Lab. Director         Hepatitis C Testing     Hepatitis C Antibody   Date Value Ref Range Status   03/02/2017  NR Final    Nonreactive   Assay performance characteristics have not been established for newborns,   infants, and children     04/13/2015  NR Final    Nonreactive   Assay performance characteristics have not been established for newborns,   infants, and children       Imaging:  No results found for this or any previous visit (from the past 744 hour(s)).

## 2017-09-16 ENCOUNTER — HOSPITAL ENCOUNTER (EMERGENCY)
Facility: CLINIC | Age: 52
Discharge: HOME | End: 2017-09-16
Attending: EMERGENCY MEDICINE | Admitting: EMERGENCY MEDICINE
Payer: COMMERCIAL

## 2017-09-16 VITALS
SYSTOLIC BLOOD PRESSURE: 120 MMHG | HEART RATE: 87 BPM | RESPIRATION RATE: 18 BRPM | TEMPERATURE: 98.4 F | DIASTOLIC BLOOD PRESSURE: 86 MMHG | OXYGEN SATURATION: 100 %
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
## 2017-09-16 DIAGNOSIS — S16.1XXA CERVICAL STRAIN, INITIAL ENCOUNTER: ICD-10-CM

## 2017-09-16 PROCEDURE — 99284 EMERGENCY DEPT VISIT MOD MDM: CPT | Mod: Z6 | Performed by: EMERGENCY MEDICINE

## 2017-09-16 PROCEDURE — 99283 EMERGENCY DEPT VISIT LOW MDM: CPT | Performed by: EMERGENCY MEDICINE

## 2017-09-16 PROCEDURE — 25000132 ZZH RX MED GY IP 250 OP 250 PS 637: Performed by: EMERGENCY MEDICINE

## 2017-09-16 RX ORDER — IBUPROFEN 600 MG/1
600 TABLET, FILM COATED ORAL EVERY 6 HOURS PRN
Qty: 20 TABLET | Refills: 0 | Status: ON HOLD | OUTPATIENT
Start: 2017-09-16 | End: 2017-11-07

## 2017-09-16 RX ORDER — OXYCODONE HYDROCHLORIDE 5 MG/1
10 TABLET ORAL ONCE
Status: COMPLETED | OUTPATIENT
Start: 2017-09-16 | End: 2017-09-16

## 2017-09-16 RX ADMIN — OXYCODONE HYDROCHLORIDE 10 MG: 5 TABLET ORAL at 04:51

## 2017-09-16 ASSESSMENT — ENCOUNTER SYMPTOMS: NECK PAIN: 1

## 2017-09-16 NOTE — ED AVS SNAPSHOT
[facility], [place], Emergency Department    [address] [place] [id number]    Phone:  [phone]                                       Andrew Lockwood   MRN: [id number]    Department:  [facility], Emergency Department   Date of Visit:  9/16/2017           After Visit Summary Signature Page     I have received my discharge instructions, and my questions have been answered. I have discussed any challenges I see with this plan with the nurse or doctor.    ..........................................................................................................................................  Patient/Patient Representative Signature      ..........................................................................................................................................  Patient Representative Print Name and Relationship to Patient    ..................................................               ................................................  Date                                            Time    ..........................................................................................................................................  Reviewed by Signature/Title    ...................................................              ..............................................  Date                                                            Time

## 2017-09-16 NOTE — DISCHARGE INSTRUCTIONS
Understanding Cervical Strain    There are 7 bones (vertebrae) in the neck that are part of the spine. These are called the cervical spine. Cervical strain is a medical term for neck pain. The neck has several layers of muscles. These are connected with tendons to the cervical spine and other bones. Neck pain is often the result of injury to these muscles and tendons.  Causes of cervical strain  Different types of stress on the neck can damage muscles and tendons (soft tissues) and cause cervical strain. Cervical tissues can be damaged by:    The neck being forced past its normal range of motion, such as in a car accident or sports injury    Constant, low-level stress, such as from poor posture or a poorly set-up workspace  Symptoms of cervical strain  These may include:    Neck pain or stiffness    Pain in the shoulders or upper back    Muscle spasms    Headache, often starting at the base of the neck    Irritability, difficulty concentrating, or sleeplessness  Treatment for cervical strain  This problem often gets better on its own. Treatments aim to reduce pain and inflammation and increase the range of motion of the neck. Possible treatments include:    Over-the-counter or prescription pain medicine. These help relieve pain and inflammation.    Stretching exercises to decrease neck stiffness.    Massage to decrease neck stiffness.    Cold or heat pack. These help reduce pain and swelling.  Call 911  Call emergency services right away if you have any of these:    Face drooping or numbness    Numbness or weakness, especially in the arms or on one side    Slurred speech or difficulty speaking    Blurred vision   When to call your healthcare provider  Call your healthcare provider right away if you have any of these:    Fever of 100.4 F (38 C) or higher, or as directed    Pain or stiffness that gets worse    Symptoms that don t get better, or get worse    Numbness, tingling, weakness or shooting pains into the  arms or legs    New symptoms  Date Last Reviewed: 3/10/2016    4631-5142 [employer]. [address] [place] [address], [place] [address]. All rights reserved. This information is not intended as a substitute for professional medical care. Always follow your healthcare professional's instructions.

## 2017-09-16 NOTE — ED PROVIDER NOTES
History     Chief Complaint   Patient presents with     Shoulder Pain     Neck Pain     HPI  Andrew Lockwood is a 49 year old male with a history of HIV/AIDS, chronic abdominal pain, diabetes who presents to ER for neck/shoulder pain.  Patient reports that one week ago he fell coming up the steps landing on his right elbow which jammed his shoulder.  He reports that he had his hand up by his face talking on cell phone when this happened and it showed the cell phone into the side of his head.  He reports since then he's had pain that goes to the right side of his neck just below his ear into his shoulder.  He denies any distal numbness or tingling in his right hand.  He denies any pain in his elbow or with abduction of the shoulder up to 90 .  He states going above that and posteriorly there is some pain.  He states mainly the pain now is in the muscle side of his neck when he does that.  He has not taken any pain medication at home but tried ice and this has not helped.  Patient reports that he is allergic to Tylenol so was not sure what to take for pain.  Patient states no difficulty with ambulation, no injury to his head or lost consciousness.  He can walk around all week without any difficulty except for the pain in the muscle mainly when he tries to lay down and his pillow does not support his head fully.  Again no numbness or tingling in his arm, no difficulty with weakness in that arm.  No radicular pain.    I have reviewed the Medications, Allergies, Past Medical and Surgical History, and Social History in the [employer] system.  Past Medical History:   Diagnosis Date     AIDS (H)      Allergic rhinitis due to other allergen     DNS     Chronic abdominal pain      CNS toxoplasmosis (H)      Diabetes type 2, controlled (H)      GERD (gastroesophageal reflux disease)      HIV (human immunodeficiency virus infection) (H)      Periungual wart      Sleep apnea     doesn't use CPAP       Past Surgical History:    Procedure Laterality Date     C NONSPECIFIC PROCEDURE      right forearm fracture     COLONOSCOPY Left 1/22/2016    Procedure: COMBINED COLONOSCOPY, SINGLE OR MULTIPLE BIOPSY/POLYPECTOMY BY BIOPSY;  Surgeon: Clark Saini MD;  Location: [facility]     HC EXPLORE UNDESC TESTIS,INGUIN/SCROTAL       LAPAROSCOPY DIAGNOSTIC (GENERAL) N/A 7/26/2016    Procedure: LAPAROSCOPY DIAGNOSTIC (GENERAL);  Surgeon: Susannah Arriaga MD;  Location: [facility] OR     OPTICAL TRACKING SYSTEM CRANIOTOMY, EXCISE TUMOR, COMBINED Left 4/10/2015    Procedure: COMBINED OPTICAL TRACKING SYSTEM CRANIOTOMY, EXCISE TUMOR;  Surgeon: Mirlande Colmenares MD;  Location: [facility] OR     REPAIR GAMEKEEPER'S THUMB Right 12/2/2016    Procedure: REPAIR LIGAMENT ULNAR COLLATERAL THUMB (GAMEKEEPER'S);  Surgeon: Evin Zamorano MD;  Location: [facility]       Family History   Problem Relation Age of Onset     DIABETES Brother      DIABETES Father      Alzheimer Disease Father      Unknown/Adopted Mother      DIABETES Paternal Grandfather      CANCER No family hx of      no skin cancer     Skin Cancer No family hx of      no famiy hx of skin cancer       Social History   Substance Use Topics     Smoking status: Current Every Day Smoker     Packs/day: 0.25     Last attempt to quit: 10/28/2016     Smokeless tobacco: Former User      Comment: just smoked very rarely/socailly in the past     Alcohol use No      Comment: Last etoh in 2007        Allergies   Allergen Reactions     Milk [Lac Bovis] Hives     Tylenol [Acetaminophen] Itching     No current facility-administered medications for this encounter.      Current Outpatient Prescriptions   Medication     ibuprofen (ADVIL/MOTRIN) 600 MG tablet     diclofenac (VOLTAREN) 1 % GEL topical gel     azithromycin (ZITHROMAX) 600 MG tablet     sulfamethoxazole-trimethoprim (BACTRIM DS/SEPTRA DS) 800-160 MG per tablet     dicyclomine (BENTYL) 10 MG capsule     montelukast (SINGULAIR) 10 MG tablet     gabapentin  (NEURONTIN) 300 MG capsule     omeprazole (PRILOSEC) 20 MG CR capsule     GENVOYA [phone] mg tablet     polyethylene glycol (MIRALAX) powder     metFORMIN (GLUCOPHAGE) 500 MG tablet     omeprazole (PRILOSEC) 20 MG CR capsule     simethicone (MYLICON) 125 MG CHEW chewable tablet     blood glucose monitoring (NO BRAND SPECIFIED) meter device kit     blood glucose monitoring (NO BRAND SPECIFIED) test strip     blood glucose (NO BRAND SPECIFIED) lancets standard     meclizine (ANTIVERT) 25 MG tablet     cyclopentolate (CYCLOGYL) 1 % ophthalmic solution     prednisoLONE acetate (PRED FORTE) 1 % ophthalmic susp     saline (AYR SALINE NASAL DROPS) 0.65 % (SOLN) SOLN     minocycline (MINOCIN/DYNACIN) 50 MG capsule     benzoyl peroxide 5 % LIQD     fluticasone (FLONASE) 50 MCG/ACT spray     albuterol (PROAIR HFA/PROVENTIL HFA/VENTOLIN HFA) 108 (90 BASE) MCG/ACT Inhaler     sildenafil (VIAGRA) 50 MG cap/tab     gabapentin (NEURONTIN) 300 MG capsule     AZITHROMYCIN PO     Review of Systems   Musculoskeletal: Positive for neck pain.   All other systems reviewed and are negative.    Physical Exam   BP: 122/83  Pulse: 96  Temp: 98.4  F (36.9  C)  Resp: 18  SpO2: 99 %  Physical Exam   Constitutional: He appears well-developed and well-nourished. No distress.   HENT:   Head: Normocephalic and atraumatic.   Right Ear: Tympanic membrane and external ear normal.   Left Ear: Tympanic membrane and external ear normal.   Eyes: Conjunctivae and EOM are normal. Pupils are equal, round, and reactive to light.   Neck: Normal range of motion and full passive range of motion without pain. Neck supple. Muscular tenderness present. No spinous process tenderness present.       Cardiovascular: Normal rate, regular rhythm and normal heart sounds.    Pulmonary/Chest: Effort normal and breath sounds normal. No respiratory distress. He has no wheezes. He has no rales.   Abdominal: Soft. There is no tenderness. There is no guarding.    Musculoskeletal: Normal range of motion. He exhibits tenderness. He exhibits no edema or deformity.   Neurological: He is alert. He exhibits normal muscle tone. Coordination normal.   Skin: Skin is warm and dry. He is not diaphoretic. No erythema.   Psychiatric: His mood appears anxious.   Nursing note and vitals reviewed.    ED Course     ED Course     Procedures        Critical Care time:  none        Labs Ordered and Resulted from Time of ED Arrival Up to the Time of Departure from the ED - No data to display       Assessments & Plan (with Medical Decision Making)   I was physically present and have reviewed and verified the accuracy of this note documented by (myself).     Disclaimer: This note consists of symbols derived from keyboarding, dictation, and/or voice recognition software. As a result, there may be errors in the script that have gone undetected.  Please consider this when interpreting information found in the chart.These sections of the chart were reviewed for accuracy to the best of my knowledge and ability.    Patient was clinically assessed and consented to treatment. After assessment, medical decision making and workup were discussed with the patient. The patient was agreeable to plan for testing, workup, and treatment.  Andrew Lockwood is a 49 year old male who presents today for right-sided neck pain.  Patient's neck pain is more in the muscle and he has no neurologic deficits.  Cranial nerves were also checked and there is no signs of any cranial nerve involvement such as vision or facial abnormalities.  Patient unlikely vertebral dissection or carotid dissection given the lack of associated symptoms and mainly pain after trauma.  The patient of the bones in the shoulder joint did not reveal any tenderness over the acromioclavicular joint or any tenderness over the posterior spinous process.  Mainly he was tender over the sternocleidomastoid muscle and given the patient's history of Tylenol  allergy she'll be given some pain medication here in the ER.  Recommended warm and ice the patient will be plan for discharge home.  It's unlikely the patient has been ambulating all week without pain in the posterior spinous process with a vertebral body fracture.  More likely this is muscle strain following the fall and injury and patient will be treated for muscle scrotal pain in plan for discharge home.    I have reviewed the nursing notes.    I have reviewed the findings, diagnosis, plan and need for follow up with the patient.    Discharge Medication List as of 9/16/2017  5:44 AM      START taking these medications    Details   ibuprofen (ADVIL/MOTRIN) 600 MG tablet Take 1 tablet (600 mg) by mouth every 6 hours as needed for moderate pain, Disp-20 tablet, R-0, Local Print             Final diagnoses:   Cervical strain, initial encounter       9/16/2017   [facility], [place], EMERGENCY DEPARTMENT     Christiano Adams MD  09/16/17 0670

## 2017-09-26 ENCOUNTER — HOSPITAL ENCOUNTER (EMERGENCY)
Facility: CLINIC | Age: 52
Discharge: HOME | End: 2017-09-26
Attending: EMERGENCY MEDICINE | Admitting: EMERGENCY MEDICINE
Payer: COMMERCIAL

## 2017-09-26 ENCOUNTER — APPOINTMENT (OUTPATIENT)
Dept: GENERAL RADIOLOGY | Facility: CLINIC | Age: 52
End: 2017-09-26
Attending: EMERGENCY MEDICINE
Payer: COMMERCIAL

## 2017-09-26 VITALS
HEIGHT: 65 IN | SYSTOLIC BLOOD PRESSURE: 130 MMHG | HEART RATE: 72 BPM | WEIGHT: 173.6 LBS | TEMPERATURE: 97.9 F | BODY MASS INDEX: 28.92 KG/M2 | OXYGEN SATURATION: 99 % | RESPIRATION RATE: 16 BRPM | DIASTOLIC BLOOD PRESSURE: 80 MMHG

## 2017-09-26 DIAGNOSIS — G89.29 OTHER CHRONIC PAIN: ICD-10-CM

## 2017-09-26 DIAGNOSIS — R10.9 STOMACH ACHE: ICD-10-CM

## 2017-09-26 DIAGNOSIS — B20 HIV DISEASE (H): ICD-10-CM

## 2017-09-26 DIAGNOSIS — K59.09 OTHER CONSTIPATION: ICD-10-CM

## 2017-09-26 DIAGNOSIS — E11.9 TYPE 2 DIABETES MELLITUS WITHOUT COMPLICATION, WITHOUT LONG-TERM CURRENT USE OF INSULIN (H): ICD-10-CM

## 2017-09-26 LAB
ALBUMIN SERPL-MCNC: 3.9 G/DL (ref 3.4–5)
ALP SERPL-CCNC: 108 U/L (ref 40–150)
ALT SERPL W P-5'-P-CCNC: 25 U/L (ref 0–70)
ANION GAP SERPL CALCULATED.3IONS-SCNC: 8 MMOL/L (ref 3–14)
AST SERPL W P-5'-P-CCNC: 21 U/L (ref 0–45)
BASOPHILS # BLD AUTO: 0 10E9/L (ref 0–0.2)
BASOPHILS NFR BLD AUTO: 0.2 %
BILIRUB SERPL-MCNC: 0.7 MG/DL (ref 0.2–1.3)
BUN SERPL-MCNC: 12 MG/DL (ref 7–30)
CALCIUM SERPL-MCNC: 9.7 MG/DL (ref 8.5–10.1)
CHLORIDE SERPL-SCNC: 108 MMOL/L (ref 94–109)
CO2 SERPL-SCNC: 23 MMOL/L (ref 20–32)
CREAT SERPL-MCNC: 0.75 MG/DL (ref 0.66–1.25)
DIFFERENTIAL METHOD BLD: NORMAL
EOSINOPHIL # BLD AUTO: 0.2 10E9/L (ref 0–0.7)
EOSINOPHIL NFR BLD AUTO: 2.3 %
ERYTHROCYTE [DISTWIDTH] IN BLOOD BY AUTOMATED COUNT: 13.3 % (ref 10–15)
GFR SERPL CREATININE-BSD FRML MDRD: >90 ML/MIN/1.7M2
GLUCOSE SERPL-MCNC: 106 MG/DL (ref 70–99)
HCT VFR BLD AUTO: 44.8 % (ref 40–53)
HGB BLD-MCNC: 15.4 G/DL (ref 13.3–17.7)
IMM GRANULOCYTES # BLD: 0.1 10E9/L (ref 0–0.4)
IMM GRANULOCYTES NFR BLD: 0.6 %
LIPASE SERPL-CCNC: 200 U/L (ref 73–393)
LYMPHOCYTES # BLD AUTO: 3.6 10E9/L (ref 0.8–5.3)
LYMPHOCYTES NFR BLD AUTO: 37.1 %
MCH RBC QN AUTO: 32.2 PG (ref 26.5–33)
MCHC RBC AUTO-ENTMCNC: 34.4 G/DL (ref 31.5–36.5)
MCV RBC AUTO: 94 FL (ref 78–100)
MONOCYTES # BLD AUTO: 0.6 10E9/L (ref 0–1.3)
MONOCYTES NFR BLD AUTO: 6.1 %
NEUTROPHILS # BLD AUTO: 5.2 10E9/L (ref 1.6–8.3)
NEUTROPHILS NFR BLD AUTO: 53.7 %
NRBC # BLD AUTO: 0 10*3/UL
NRBC BLD AUTO-RTO: 0 /100
PLATELET # BLD AUTO: 257 10E9/L (ref 150–450)
POTASSIUM SERPL-SCNC: 3.7 MMOL/L (ref 3.4–5.3)
PROT SERPL-MCNC: 7.8 G/DL (ref 6.8–8.8)
RBC # BLD AUTO: 4.78 10E12/L (ref 4.4–5.9)
SODIUM SERPL-SCNC: 139 MMOL/L (ref 133–144)
WBC # BLD AUTO: 9.6 10E9/L (ref 4–11)

## 2017-09-26 PROCEDURE — 83690 ASSAY OF LIPASE: CPT | Performed by: EMERGENCY MEDICINE

## 2017-09-26 PROCEDURE — 99283 EMERGENCY DEPT VISIT LOW MDM: CPT | Mod: Z6 | Performed by: EMERGENCY MEDICINE

## 2017-09-26 PROCEDURE — 99283 EMERGENCY DEPT VISIT LOW MDM: CPT | Performed by: EMERGENCY MEDICINE

## 2017-09-26 PROCEDURE — 80053 COMPREHEN METABOLIC PANEL: CPT | Performed by: EMERGENCY MEDICINE

## 2017-09-26 PROCEDURE — 74020 XR ABDOMEN 2 VW: CPT

## 2017-09-26 PROCEDURE — 85025 COMPLETE CBC W/AUTO DIFF WBC: CPT | Performed by: EMERGENCY MEDICINE

## 2017-09-26 RX ORDER — MAGNESIUM CARB/ALUMINUM HYDROX 105-160MG
296 TABLET,CHEWABLE ORAL ONCE
Status: DISCONTINUED | OUTPATIENT
Start: 2017-09-26 | End: 2017-09-27 | Stop reason: HOSPADM

## 2017-09-26 ASSESSMENT — ENCOUNTER SYMPTOMS
NAUSEA: 0
ABDOMINAL PAIN: 1
CONSTIPATION: 0
DIARRHEA: 0
VOMITING: 0

## 2017-09-26 NOTE — ED AVS SNAPSHOT
[facility], Emergency Department    [address] [address]    Phone:  [phone]                                       Andrew Lockwood   MRN: [id number]    Department:  [facility], Emergency Department   Date of Visit:  9/26/2017           Patient Information     Date Of Birth          11/27/1967        Your diagnoses for this visit were:     Other constipation        You were seen by Torres Saenz MD.        Discharge Instructions         Treating Constipation    Constipation is a common and often uncomfortable problem. Constipation means you have bowel movements fewer than 3 times per week, or strain to pass hard, dry stool. It can last a short time. Or it can be a problem that never seems to go away. The good news is that it can often be treated and controlled.  Eat more fiber  One of the best ways to help treat constipation is to increase your fiber intake. You can do this either through diet or by using fiber supplements. Fiber (in whole grains, fruits, and vegetables) adds bulk and absorbs water to soften the stool. This helps the stool pass through the colon more easily. When you increase your fiber intake, do it slowly to avoid side effects such as bloating. Also increase the amount of water that you drink. Eating more of the following foods can add fiber to your diet.    High-fiber cereals    Whole grains, bran, and brown rice    Vegetables such as carrots, broccoli, and greens    Fresh fruits (especially apples, pears, and dried fruits like raisins and apricots)    Nuts and legumes (especially beans such as lentils, kidney beans, and lima beans)  Get physically active  Exercise helps improve the working of your colon which helps ease constipation. Try to get some physical activity every day. If you haven t been active for a while, talk to your healthcare provider before starting again.  Laxatives  Your healthcare provider may suggest an over-the-counter product to help ease  your constipation. He or she may suggest the use of bulk-forming agents or laxatives. The use of laxatives, if used as directed, is common and safe. Follow directions carefully when using them. See your healthcare provider for new-onset constipation, or long-term constipation, to rule out other causes such as medicines or thyroid disease.  Date Last Reviewed: 7/1/2016 2000-2017 [employer]. [address] [place] [address], [place] [address]. All rights reserved. This information is not intended as a substitute for professional medical care. Always follow your healthcare professional's instructions.          24 Hour Appointment Hotline       To make an appointment at any [facility], call [phone] ([phone]). If you don't have a family doctor or clinic, we will help you find one. [facility] clinics are conveniently located to serve the needs of you and your family.             Review of your medicines      Our records show that you are taking the medicines listed below. If these are incorrect, please call your family doctor or clinic.        Dose / Directions Last dose taken    albuterol 108 (90 BASE) MCG/ACT Inhaler   Commonly known as:  PROAIR HFA/PROVENTIL HFA/VENTOLIN HFA   Dose:  2 puff   Quantity:  1 Inhaler        Inhale 2 puffs into the lungs every 6 hours as needed for shortness of breath / dyspnea or wheezing   Refills:  0        * AZITHROMYCIN PO   Dose:  600 mg        Take 600 mg by mouth once a week   Refills:  0        * azithromycin 600 MG tablet   Commonly known as:  ZITHROMAX   Quantity:  8 tablet        TAKE 2 TABLETS (1200 MG) BY MOUTH ONCE EVERY 7 DAYS   Refills:  11        benzoyl peroxide 5 % Liqd   Quantity:  226 g        Use daily in shower   Refills:  11        blood glucose lancets standard   Commonly known as:  no brand specified   Quantity:  100 Box        Use to test blood sugar four times daily or as directed.   Refills:  5        blood glucose monitoring meter  device kit   Commonly known as:  no brand specified   Quantity:  1 kit        Use to test blood sugar four times daily or as directed.   Refills:  0        blood glucose monitoring test strip   Commonly known as:  no brand specified   Quantity:  100 each        Use to test blood sugars four times daily or as directed   Refills:  5        cyclopentolate 1 % ophthalmic solution   Commonly known as:  CYCLOGYL   Dose:  1 drop   Quantity:  1 Bottle        Place 1 drop Into the left eye 2 times daily   Refills:  0        diclofenac 1 % Gel topical gel   Commonly known as:  VOLTAREN   Quantity:  100 g        Apply 4 grams to knees or 2 grams to hands four times daily using enclosed dosing card.   Refills:  1        dicyclomine 10 MG capsule   Commonly known as:  BENTYL   Dose:  10 mg   Quantity:  240 capsule        Take 1 capsule (10 mg) by mouth 4 times daily (before meals and nightly) /BEDTIME   Refills:  0        Wjnsuku-Fdmqv-Wjxauyny-TenofAF 095-742-222-10 MG Tabs per tablet   Commonly known as:  GENVOYA   Dose:  1 tablet   Quantity:  30 tablet        Take 1 tablet by mouth daily   Refills:  5        fluticasone 50 MCG/ACT spray   Commonly known as:  FLONASE   Dose:  1 spray   Quantity:  1 Bottle        Spray 1 spray into both nostrils daily   Refills:  1        * gabapentin 300 MG capsule   Commonly known as:  NEURONTIN   Quantity:  90 capsule        TAKE ONE CAPSULE BY MOUTH ON DAY 1, THEN TAKE ONE CAPSULE TWICE DAILY ON DAY 2 THEN TAKE ONE CAPSULE THREE TIMES A DAY.   Refills:  3        * gabapentin 300 MG capsule   Commonly known as:  NEURONTIN   Dose:  300 mg   Quantity:  90 capsule        Take 1 capsule (300 mg) by mouth 3 times daily   Refills:  5        ibuprofen 600 MG tablet   Commonly known as:  ADVIL/MOTRIN   Dose:  600 mg   Quantity:  20 tablet        Take 1 tablet (600 mg) by mouth every 6 hours as needed for moderate pain   Refills:  0        meclizine 25 MG tablet   Commonly known as:  ANTIVERT   Dose:   25 mg   Quantity:  20 tablet        Take 1 tablet (25 mg) by mouth 3 times daily as needed for dizziness   Refills:  1        metFORMIN 500 MG tablet   Commonly known as:  GLUCOPHAGE   Quantity:  120 tablet        TAKE TWO TABLETS BY MOUTH TWICE A DAY WITH MEALS   Refills:  3        minocycline 50 MG capsule   Commonly known as:  MINOCIN/DYNACIN   Dose:  50 mg   Quantity:  60 capsule        Take 1 capsule (50 mg) by mouth 2 times daily   Refills:  0        montelukast 10 MG tablet   Commonly known as:  SINGULAIR   Dose:  10 mg   Quantity:  90 tablet        Take 1 tablet (10 mg) by mouth At Bedtime   Refills:  2        * omeprazole 20 MG CR capsule   Commonly known as:  priLOSEC   Quantity:  60 capsule        TAKE ONE CAPSULE BY MOUTH EVERY DAY   Refills:  3        * omeprazole 20 MG CR capsule   Commonly known as:  priLOSEC   Quantity:  90 capsule        TAKE ONE CAPSULE BY MOUTH EVERY DAY   Refills:  1        polyethylene glycol powder   Commonly known as:  MIRALAX   Dose:  1 capful   Quantity:  527 g        Take 17 g (1 capful) by mouth daily   Refills:  0        prednisoLONE acetate 1 % ophthalmic susp   Commonly known as:  PRED FORTE   Dose:  1 drop   Quantity:  1 Bottle        Place 1 drop Into the left eye 4 times daily   Refills:  0        saline 0.65 % (SOLN) Soln   Commonly known as:  AYR SALINE NASAL DROPS   Dose:  1-2 drop   Quantity:  1 Bottle        Spray 1-2 drops in nostril 2 times daily   Refills:  0        sildenafil 50 MG tablet   Commonly known as:  VIAGRA   Dose:  25 mg   Quantity:  30 tablet        Take 0.5 tablets (25 mg) by mouth daily as needed for erectile dysfunction   Refills:  0        simethicone 125 MG Chew chewable tablet   Commonly known as:  MYLICON   Dose:  125 mg   Quantity:  30 tablet        Take 1 tablet (125 mg) by mouth 4 times daily as needed for intestinal gas   Refills:  0        sulfamethoxazole-trimethoprim 800-160 MG per tablet   Commonly known as:  BACTRIM DS/SEPTRA DS    Quantity:  60 tablet        TAKE 1 TABLET BY MOUTH 2 TIMES DAILY   Refills:  5        * Notice:  This list has 6 medication(s) that are the same as other medications prescribed for you. Read the directions carefully, and ask your doctor or other care provider to review them with you.            Procedures and tests performed during your visit     Abdomen XR, 2 vw, flat and upright    CBC with platelets differential    Comprehensive metabolic panel    Lipase      Orders Needing Specimen Collection     None      Pending Results     Date and Time Order Name Status Description    9/26/2017 2236 Abdomen XR, 2 vw, flat and upright In process             Pending Culture Results     No orders found from 9/24/2017 to 9/27/2017.            Pending Results Instructions     If you had any lab results that were not finalized at the time of your Discharge, you can call the ED Lab Result RN at [phone]. You will be contacted by this team for any positive Lab results or changes in treatment. The nurses are available 7 days a week from 10A to 6:30P.  You can leave a message 24 hours per day and they will return your call.        Thank you for choosing [facility]       Thank you for choosing [facility] for your care. Our goal is always to provide you with excellent care. Hearing back from our patients is one way we can continue to improve our services. Please take a few minutes to complete the written survey that you may receive in the mail after you visit with us. Thank you!        [employer] Information     [facility] gives you secure access to your electronic health record. If you see a primary care provider, you can also send messages to your care team and make appointments. If you have questions, please call your primary care clinic.  If you do not have a primary care provider, please call [phone] and they will assist you.        Care EveryWhere ID     This is your Care EveryWhere ID. This could be used by other organizations  to access your [place] medical records  [id number]        Equal Access to Services     RA JORDAN : Apollo Cody, maverick centeno, dez jackson. So [facility] [phone].    ATENCIÓN: Si habla español, tiene a kohli disposición servicios gratuitos de asistencia lingüística. Llame al [phone].    We comply with applicable federal civil rights laws and [place] laws. We do not discriminate on the basis of race, color, national origin, age, disability sex, sexual orientation or gender identity.            After Visit Summary       This is your record. Keep this with you and show to your community pharmacist(s) and doctor(s) at your next visit.

## 2017-09-26 NOTE — ED AVS SNAPSHOT
[facility], [place], Emergency Department    [address] [place] [address]    Phone:  [phone]                                       Andrew Lockwood   MRN: [id number]    Department:  [facility], Emergency Department   Date of Visit:  9/26/2017           After Visit Summary Signature Page     I have received my discharge instructions, and my questions have been answered. I have discussed any challenges I see with this plan with the nurse or doctor.    ..........................................................................................................................................  Patient/Patient Representative Signature      ..........................................................................................................................................  Patient Representative Print Name and Relationship to Patient    ..................................................               ................................................  Date                                            Time    ..........................................................................................................................................  Reviewed by Signature/Title    ...................................................              ..............................................  Date                                                            Time

## 2017-09-27 NOTE — DISCHARGE INSTRUCTIONS
Treating Constipation    Constipation is a common and often uncomfortable problem. Constipation means you have bowel movements fewer than 3 times per week, or strain to pass hard, dry stool. It can last a short time. Or it can be a problem that never seems to go away. The good news is that it can often be treated and controlled.  Eat more fiber  One of the best ways to help treat constipation is to increase your fiber intake. You can do this either through diet or by using fiber supplements. Fiber (in whole grains, fruits, and vegetables) adds bulk and absorbs water to soften the stool. This helps the stool pass through the colon more easily. When you increase your fiber intake, do it slowly to avoid side effects such as bloating. Also increase the amount of water that you drink. Eating more of the following foods can add fiber to your diet.    High-fiber cereals    Whole grains, bran, and brown rice    Vegetables such as carrots, broccoli, and greens    Fresh fruits (especially apples, pears, and dried fruits like raisins and apricots)    Nuts and legumes (especially beans such as lentils, kidney beans, and lima beans)  Get physically active  Exercise helps improve the working of your colon which helps ease constipation. Try to get some physical activity every day. If you haven t been active for a while, talk to your healthcare provider before starting again.  Laxatives  Your healthcare provider may suggest an over-the-counter product to help ease your constipation. He or she may suggest the use of bulk-forming agents or laxatives. The use of laxatives, if used as directed, is common and safe. Follow directions carefully when using them. See your healthcare provider for new-onset constipation, or long-term constipation, to rule out other causes such as medicines or thyroid disease.  Date Last Reviewed: 7/1/2016 2000-2017 [employer]. [address] [place] [address], [place] [address]. All rights reserved.  This information is not intended as a substitute for professional medical care. Always follow your healthcare professional's instructions.

## 2017-09-27 NOTE — ED NOTES
Mid-abdominal pain, chronic  Denies n/v/d  States this is the pain he always has. No change in severity of pain  Here because pain is very severe

## 2017-09-27 NOTE — ED PROVIDER NOTES
History     Chief Complaint   Patient presents with     Abdominal Pain     HPI  Andrew Lockwood is a 49 year old male with a history of HIV/AIDS, chronic abdominal pain, type II diabetes and GERD who presents with abdominal pain. The patient complains of diffuse abdominal cramping since this morning, which he reports is similar to his chronic abdominal pain. He denies constipation or diarrhea. He denies nausea or vomiting.     Past Medical History:   Diagnosis Date     AIDS (H)      Allergic rhinitis due to other allergen     DNS     Chronic abdominal pain      CNS toxoplasmosis (H)      Diabetes type 2, controlled (H)      GERD (gastroesophageal reflux disease)      HIV (human immunodeficiency virus infection) (H)      Periungual wart      Sleep apnea     doesn't use CPAP       Past Surgical History:   Procedure Laterality Date     C NONSPECIFIC PROCEDURE      right forearm fracture     COLONOSCOPY Left 1/22/2016    Procedure: COMBINED COLONOSCOPY, SINGLE OR MULTIPLE BIOPSY/POLYPECTOMY BY BIOPSY;  Surgeon: Clark Saini MD;  Location: [facility]     HC EXPLORE UNDESC TESTIS,INGUIN/SCROTAL       LAPAROSCOPY DIAGNOSTIC (GENERAL) N/A 7/26/2016    Procedure: LAPAROSCOPY DIAGNOSTIC (GENERAL);  Surgeon: Susannah Arriaga MD;  Location: [facility]     OPTICAL TRACKING SYSTEM CRANIOTOMY, EXCISE TUMOR, COMBINED Left 4/10/2015    Procedure: COMBINED OPTICAL TRACKING SYSTEM CRANIOTOMY, EXCISE TUMOR;  Surgeon: Mirlande Colmenares MD;  Location: [facility]     REPAIR GAMEKEEPER'S THUMB Right 12/2/2016    Procedure: REPAIR LIGAMENT ULNAR COLLATERAL THUMB (GAMEKEEPER'S);  Surgeon: Evin Zamorano MD;  Location: [facility]       Family History   Problem Relation Age of Onset     DIABETES Brother      DIABETES Father      Alzheimer Disease Father      Unknown/Adopted Mother      DIABETES Paternal Grandfather      CANCER No family hx of      no skin cancer     Skin Cancer No family hx of      no famiy hx of skin cancer       Social  History   Substance Use Topics     Smoking status: Current Every Day Smoker     Packs/day: 0.25     Last attempt to quit: 10/28/2016     Smokeless tobacco: Former User      Comment: just smoked very rarely/socailly in the past     Alcohol use No      Comment: Last etoh in 2007     Current Facility-Administered Medications   Medication     magnesium citrate solution 296 mL     Current Outpatient Prescriptions   Medication     ibuprofen (ADVIL/MOTRIN) 600 MG tablet     diclofenac (VOLTAREN) 1 % GEL topical gel     azithromycin (ZITHROMAX) 600 MG tablet     sulfamethoxazole-trimethoprim (BACTRIM DS/SEPTRA DS) 800-160 MG per tablet     dicyclomine (BENTYL) 10 MG capsule     montelukast (SINGULAIR) 10 MG tablet     gabapentin (NEURONTIN) 300 MG capsule     omeprazole (PRILOSEC) 20 MG CR capsule     GENVOYA [id number] mg tablet     polyethylene glycol (MIRALAX) powder     metFORMIN (GLUCOPHAGE) 500 MG tablet     omeprazole (PRILOSEC) 20 MG CR capsule     simethicone (MYLICON) 125 MG CHEW chewable tablet     blood glucose monitoring (NO BRAND SPECIFIED) meter device kit     blood glucose monitoring (NO BRAND SPECIFIED) test strip     blood glucose (NO BRAND SPECIFIED) lancets standard     meclizine (ANTIVERT) 25 MG tablet     cyclopentolate (CYCLOGYL) 1 % ophthalmic solution     prednisoLONE acetate (PRED FORTE) 1 % ophthalmic susp     saline (AYR SALINE NASAL DROPS) 0.65 % (SOLN) SOLN     minocycline (MINOCIN/DYNACIN) 50 MG capsule     benzoyl peroxide 5 % LIQD     fluticasone (FLONASE) 50 MCG/ACT spray     albuterol (PROAIR HFA/PROVENTIL HFA/VENTOLIN HFA) 108 (90 BASE) MCG/ACT Inhaler     sildenafil (VIAGRA) 50 MG cap/tab     gabapentin (NEURONTIN) 300 MG capsule     AZITHROMYCIN PO        Allergies   Allergen Reactions     Milk [Lac Bovis] Hives     Tylenol [Acetaminophen] Itching      I have reviewed the Medications, Allergies, Past Medical and Surgical History, and Social History in the Epic system.    Review of  "Systems   Gastrointestinal: Positive for abdominal pain (diffuse cramping). Negative for constipation, diarrhea, nausea and vomiting.   All other systems reviewed and are negative.      Physical Exam   BP: 133/89  Pulse: 105  Temp: 97.9  F (36.6  C)  Resp: 16  Height: 165.1 cm (5' 5\")  Weight: 78.7 kg (173 lb 9.6 oz)  SpO2: 99 %  Physical Exam   Constitutional: He is oriented to person, place, and time. He appears well-developed and well-nourished. No distress.   HENT:   Head: Normocephalic and atraumatic.   Eyes: No scleral icterus.   Neck: Normal range of motion. Neck supple.   Cardiovascular: Normal rate.    Pulmonary/Chest: Effort normal. No respiratory distress.   Abdominal: Soft. There is no tenderness.   Neurological: He is alert and oriented to person, place, and time.   Skin: Skin is warm and dry. No rash noted. He is not diaphoretic. No erythema. No pallor.       ED Course     ED Course     Procedures     10:30 PM  The patient was seen and examined by Dr. Saenz in Room 3.               Critical Care time:  none         Results for orders placed or performed during the hospital encounter of 09/26/17   CBC with platelets differential   Result Value Ref Range    WBC 9.6 4.0 - 11.0 10e9/L    RBC Count 4.78 4.4 - 5.9 10e12/L    Hemoglobin 15.4 13.3 - 17.7 g/dL    Hematocrit 44.8 40.0 - 53.0 %    MCV 94 78 - 100 fl    MCH 32.2 26.5 - 33.0 pg    MCHC 34.4 31.5 - 36.5 g/dL    RDW 13.3 10.0 - 15.0 %    Platelet Count 257 150 - 450 10e9/L    Diff Method Automated Method     % Neutrophils 53.7 %    % Lymphocytes 37.1 %    % Monocytes 6.1 %    % Eosinophils 2.3 %    % Basophils 0.2 %    % Immature Granulocytes 0.6 %    Nucleated RBCs 0 0 /100    Absolute Neutrophil 5.2 1.6 - 8.3 10e9/L    Absolute Lymphocytes 3.6 0.8 - 5.3 10e9/L    Absolute Monocytes 0.6 0.0 - 1.3 10e9/L    Absolute Eosinophils 0.2 0.0 - 0.7 10e9/L    Absolute Basophils 0.0 0.0 - 0.2 10e9/L    Abs Immature Granulocytes 0.1 0 - 0.4 10e9/L    Absolute " Nucleated RBC 0.0    Comprehensive metabolic panel   Result Value Ref Range    Sodium 139 133 - 144 mmol/L    Potassium 3.7 3.4 - 5.3 mmol/L    Chloride 108 94 - 109 mmol/L    Carbon Dioxide 23 20 - 32 mmol/L    Anion Gap 8 3 - 14 mmol/L    Glucose 106 (H) 70 - 99 mg/dL    Urea Nitrogen 12 7 - 30 mg/dL    Creatinine 0.75 0.66 - 1.25 mg/dL    GFR Estimate >90 >60 mL/min/1.7m2    GFR Estimate If Black >90 >60 mL/min/1.7m2    Calcium 9.7 8.5 - 10.1 mg/dL    Bilirubin Total 0.7 0.2 - 1.3 mg/dL    Albumin 3.9 3.4 - 5.0 g/dL    Protein Total 7.8 6.8 - 8.8 g/dL    Alkaline Phosphatase 108 40 - 150 U/L    ALT 25 0 - 70 U/L    AST 21 0 - 45 U/L   Lipase   Result Value Ref Range    Lipase 200 73 - 393 U/L            Labs Ordered and Resulted from Time of ED Arrival Up to the Time of Departure from the ED   COMPREHENSIVE METABOLIC PANEL - Abnormal; Notable for the following:        Result Value    Glucose 106 (*)     All other components within normal limits   CBC WITH PLATELETS DIFFERENTIAL   LIPASE            Assessments & Plan (with Medical Decision Making)   This is a 49-year-old male well-known to the emergency room for chronic abdominal pain is presenting with left lower quadrant pain.  All labs reviewed which were grossly unremarkable.  Abdominal x-rays concerning for constipation.  He is often seen in the emergency room for abdominal pain and diagnosed with constipation.  I recommend that he take some mag citrate and follow up with his primary care doctor for any further concerns.    I have reviewed the nursing notes.    I have reviewed the findings, diagnosis, plan and need for follow up with the patient.    Current Discharge Medication List          Final diagnoses:   Other constipation     IChucky, am serving as a trained medical scribe to document services personally performed by Torres Saenz MD, based on the provider's statements to me.   I, Torres Saenz MD, was physically present and have  reviewed and verified the accuracy of this note documented by Chucky Villatoro.     9/26/2017   [facility], [place], EMERGENCY DEPARTMENT     Torres Saenz MD  09/26/17 1704

## 2017-10-19 ENCOUNTER — HOSPITAL ENCOUNTER (EMERGENCY)
Facility: CLINIC | Age: 52
Discharge: HOME | End: 2017-10-19
Attending: EMERGENCY MEDICINE | Admitting: EMERGENCY MEDICINE
Payer: COMMERCIAL

## 2017-10-19 VITALS
TEMPERATURE: 98.1 F | HEIGHT: 65 IN | OXYGEN SATURATION: 99 % | SYSTOLIC BLOOD PRESSURE: 118 MMHG | DIASTOLIC BLOOD PRESSURE: 72 MMHG | RESPIRATION RATE: 16 BRPM

## 2017-10-19 DIAGNOSIS — R40.0 SOMNOLENCE: ICD-10-CM

## 2017-10-19 DIAGNOSIS — Z21 HUMAN IMMUNODEFICIENCY VIRUS I INFECTION (H): ICD-10-CM

## 2017-10-19 DIAGNOSIS — R42 LIGHTHEADEDNESS: ICD-10-CM

## 2017-10-19 LAB
ALCOHOL BREATH TEST: 0 (ref 0–0.01)
ANION GAP SERPL CALCULATED.3IONS-SCNC: 9 MMOL/L (ref 3–14)
BASOPHILS # BLD AUTO: 0 10E9/L (ref 0–0.2)
BASOPHILS NFR BLD AUTO: 0.5 %
BUN SERPL-MCNC: 8 MG/DL (ref 7–30)
CALCIUM SERPL-MCNC: 8.9 MG/DL (ref 8.5–10.1)
CHLORIDE SERPL-SCNC: 105 MMOL/L (ref 94–109)
CO2 SERPL-SCNC: 26 MMOL/L (ref 20–32)
CREAT SERPL-MCNC: 0.75 MG/DL (ref 0.66–1.25)
DIFFERENTIAL METHOD BLD: NORMAL
EOSINOPHIL # BLD AUTO: 0.3 10E9/L (ref 0–0.7)
EOSINOPHIL NFR BLD AUTO: 4.1 %
ERYTHROCYTE [DISTWIDTH] IN BLOOD BY AUTOMATED COUNT: 13.4 % (ref 10–15)
GFR SERPL CREATININE-BSD FRML MDRD: >90 ML/MIN/1.7M2
GLUCOSE BLDC GLUCOMTR-MCNC: 148 MG/DL (ref 70–99)
GLUCOSE SERPL-MCNC: 141 MG/DL (ref 70–99)
HCT VFR BLD AUTO: 41 % (ref 40–53)
HGB BLD-MCNC: 14.3 G/DL (ref 13.3–17.7)
IMM GRANULOCYTES # BLD: 0.1 10E9/L (ref 0–0.4)
IMM GRANULOCYTES NFR BLD: 0.8 %
LYMPHOCYTES # BLD AUTO: 2.4 10E9/L (ref 0.8–5.3)
LYMPHOCYTES NFR BLD AUTO: 38.4 %
MCH RBC QN AUTO: 32.4 PG (ref 26.5–33)
MCHC RBC AUTO-ENTMCNC: 34.9 G/DL (ref 31.5–36.5)
MCV RBC AUTO: 93 FL (ref 78–100)
MONOCYTES # BLD AUTO: 0.5 10E9/L (ref 0–1.3)
MONOCYTES NFR BLD AUTO: 7.7 %
NEUTROPHILS # BLD AUTO: 3.1 10E9/L (ref 1.6–8.3)
NEUTROPHILS NFR BLD AUTO: 48.5 %
NRBC # BLD AUTO: 0 10*3/UL
NRBC BLD AUTO-RTO: 0 /100
PLATELET # BLD AUTO: 239 10E9/L (ref 150–450)
POTASSIUM SERPL-SCNC: 3.4 MMOL/L (ref 3.4–5.3)
RBC # BLD AUTO: 4.41 10E12/L (ref 4.4–5.9)
SODIUM SERPL-SCNC: 140 MMOL/L (ref 133–144)
WBC # BLD AUTO: 6.4 10E9/L (ref 4–11)

## 2017-10-19 PROCEDURE — 25000128 H RX IP 250 OP 636: Performed by: EMERGENCY MEDICINE

## 2017-10-19 PROCEDURE — 82075 ASSAY OF BREATH ETHANOL: CPT

## 2017-10-19 PROCEDURE — 85025 COMPLETE CBC W/AUTO DIFF WBC: CPT | Performed by: EMERGENCY MEDICINE

## 2017-10-19 PROCEDURE — 93005 ELECTROCARDIOGRAM TRACING: CPT

## 2017-10-19 PROCEDURE — 93010 ELECTROCARDIOGRAM REPORT: CPT | Mod: Z6 | Performed by: EMERGENCY MEDICINE

## 2017-10-19 PROCEDURE — 99284 EMERGENCY DEPT VISIT MOD MDM: CPT | Mod: 25 | Performed by: EMERGENCY MEDICINE

## 2017-10-19 PROCEDURE — 99284 EMERGENCY DEPT VISIT MOD MDM: CPT | Mod: 25

## 2017-10-19 PROCEDURE — 96360 HYDRATION IV INFUSION INIT: CPT

## 2017-10-19 PROCEDURE — 80048 BASIC METABOLIC PNL TOTAL CA: CPT | Performed by: EMERGENCY MEDICINE

## 2017-10-19 PROCEDURE — 96361 HYDRATE IV INFUSION ADD-ON: CPT

## 2017-10-19 PROCEDURE — 00000146 ZZHCL STATISTIC GLUCOSE BY METER IP

## 2017-10-19 RX ORDER — SODIUM CHLORIDE 9 MG/ML
1000 INJECTION, SOLUTION INTRAVENOUS CONTINUOUS
Status: DISCONTINUED | OUTPATIENT
Start: 2017-10-19 | End: 2017-10-19 | Stop reason: HOSPADM

## 2017-10-19 RX ADMIN — SODIUM CHLORIDE 1000 ML: 9 INJECTION, SOLUTION INTRAVENOUS at 14:02

## 2017-10-19 NOTE — ED PROVIDER NOTES
History     Chief Complaint   Patient presents with     Fatigue     Dry Eye(s) Both Eyes     Headache     HPI  Andrew Lockwood is a 49 year old male with a medical history of HIV, type II diabetes, among other medical problems, presenting with generalized lightheadedness.   At triage she complained of multiple complaints including fatigue, sweating, dry eyes and dry mouth since Monday.  At the time of my examination the patient is very somnolent. He is arousable but quickly falls back to sleep.  he states that he spent the night somewhere in [place], he s not sure where, thinks it may have been an immigration office.  He appears to be either very sleepy or somewhat intoxicated. At the time of my interview he only endorses feeling lightheaded.  Denies chest pain, shortness of breath, abdominal pain, vomiting. Denies alcohol or drug use. Denies any other medical problems other than diabetes, though he is known to have others. He has been seen in the Emergency Department frequently for abdominal pain of uncertain cause but has none currently.    Current Facility-Administered Medications   Medication     0.9% sodium chloride infusion     Current Outpatient Prescriptions   Medication     ibuprofen (ADVIL/MOTRIN) 600 MG tablet     diclofenac (VOLTAREN) 1 % GEL topical gel     azithromycin (ZITHROMAX) 600 MG tablet     sulfamethoxazole-trimethoprim (BACTRIM DS/SEPTRA DS) 800-160 MG per tablet     dicyclomine (BENTYL) 10 MG capsule     montelukast (SINGULAIR) 10 MG tablet     gabapentin (NEURONTIN) 300 MG capsule     omeprazole (PRILOSEC) 20 MG CR capsule     GENVOYA [id number] mg tablet     polyethylene glycol (MIRALAX) powder     metFORMIN (GLUCOPHAGE) 500 MG tablet     omeprazole (PRILOSEC) 20 MG CR capsule     simethicone (MYLICON) 125 MG CHEW chewable tablet     blood glucose monitoring (NO BRAND SPECIFIED) meter device kit     blood glucose monitoring (NO BRAND SPECIFIED) test strip     blood glucose (NO BRAND  SPECIFIED) lancets standard     meclizine (ANTIVERT) 25 MG tablet     cyclopentolate (CYCLOGYL) 1 % ophthalmic solution     prednisoLONE acetate (PRED FORTE) 1 % ophthalmic susp     saline (AYR SALINE NASAL DROPS) 0.65 % (SOLN) SOLN     minocycline (MINOCIN/DYNACIN) 50 MG capsule     benzoyl peroxide 5 % LIQD     fluticasone (FLONASE) 50 MCG/ACT spray     albuterol (PROAIR HFA/PROVENTIL HFA/VENTOLIN HFA) 108 (90 BASE) MCG/ACT Inhaler     sildenafil (VIAGRA) 50 MG cap/tab     gabapentin (NEURONTIN) 300 MG capsule     AZITHROMYCIN PO     Past Medical History:   Diagnosis Date     AIDS (H)      Allergic rhinitis due to other allergen     DNS     Chronic abdominal pain      CNS toxoplasmosis (H)      Diabetes type 2, controlled (H)      GERD (gastroesophageal reflux disease)      HIV (human immunodeficiency virus infection) (H)      Periungual wart      Sleep apnea     doesn't use CPAP       Past Surgical History:   Procedure Laterality Date     C NONSPECIFIC PROCEDURE      right forearm fracture     COLONOSCOPY Left 1/22/2016    Procedure: COMBINED COLONOSCOPY, SINGLE OR MULTIPLE BIOPSY/POLYPECTOMY BY BIOPSY;  Surgeon: Clark Saini MD;  Location: [facility]     HC EXPLORE UNDESC TESTIS,INGUIN/SCROTAL       LAPAROSCOPY DIAGNOSTIC (GENERAL) N/A 7/26/2016    Procedure: LAPAROSCOPY DIAGNOSTIC (GENERAL);  Surgeon: Susannah Arriaga MD;  Location: [facility]     OPTICAL TRACKING SYSTEM CRANIOTOMY, EXCISE TUMOR, COMBINED Left 4/10/2015    Procedure: COMBINED OPTICAL TRACKING SYSTEM CRANIOTOMY, EXCISE TUMOR;  Surgeon: Mirlande Colmenares MD;  Location: [facility]     REPAIR GAMEKEEPER'S THUMB Right 12/2/2016    Procedure: REPAIR LIGAMENT ULNAR COLLATERAL THUMB (GAMEKEEPER'S);  Surgeon: Evin Zamorano MD;  Location: [facility]       Family History   Problem Relation Age of Onset     DIABETES Brother      DIABETES Father      Alzheimer Disease Father      Unknown/Adopted Mother      DIABETES Paternal Grandfather      CANCER No  "family hx of      no skin cancer     Skin Cancer No family hx of      no famiy hx of skin cancer       Social History   Substance Use Topics     Smoking status: Current Every Day Smoker     Packs/day: 0.25     Last attempt to quit: 10/28/2016     Smokeless tobacco: Former User      Comment: just smoked very rarely/socailly in the past     Alcohol use No      Comment: Last etoh in 2007        Allergies   Allergen Reactions     Milk [Lac Bovis] Hives     Tylenol [Acetaminophen] Itching       I have reviewed the Medications, Allergies, Past Medical and Surgical History, and Social History in the Epic system.    Review of Systems   ROS: 14 point ROS neg other than the symptoms noted above in the HPI.     Physical Exam   BP: 122/71  Heart Rate: 89  Temp: 98.1  F (36.7  C)  Resp: 17  Height: 165.1 cm (5' 5\")  SpO2: 96 %      Physical Exam  GEN: Well appearing, non toxic. Very somnolent, prefers to sleep rather than converse.  HEENT: The head is normocephalic and atraumatic. Pupils are equal round and reactive to light. Extraocular motions are intact. There is no facial swelling. The neck is nontender and supple.   CV: Regular rate and rhythm without murmurs rubs or gallops. 2+ radial pulses bilaterally.  PULM: Clear to auscultation bilaterally.  ABD: Soft, nontender, nondistended. Normal bowel sounds.   EXT: Full range of motion.  No edema.  NEURO: Cranial nerves II through XII are intact and symmetric. Bilateral upper and lower extremities grossly show full range of motion without any focal deficits.   SKIN: No rashes, ecchymosis, or lacerations  PSYCH: Calm and cooperative, interactive.     ED Course     ED Course     Procedures             EKG Interpretation:      Interpreted by Andrew Bernal MD  Time reviewed: 14:09  Symptoms at time of EKG: Fatigue   Rhythm: normal sinus   Rate: 79  Axis: normal  Ectopy: none  Conduction: normal  ST Segments/ T Waves: No ST-T wave changes  Q Waves: none  Comparison to prior: Unchanged " from 3 May 2017    Clinical Impression: normal EKG, no ischemic changes.         Labs Ordered and Resulted from Time of ED Arrival Up to the Time of Departure from the ED   GLUCOSE BY METER - Abnormal; Notable for the following:        Result Value    Glucose 148 (*)     All other components within normal limits   BASIC METABOLIC PANEL - Abnormal; Notable for the following:     Glucose 141 (*)     All other components within normal limits   ALCOHOL BREATH TEST POCT - Normal   CBC WITH PLATELETS DIFFERENTIAL            Assessments & Plan (with Medical Decision Making)   49-year-old male with type II diabetes and HIV presenting with altered mental status.  Differential diagnosis is broad including Intoxication from alcohol, opiates, benzodiazepines, other psychoactive substances, meningitis, acute intracranial injury including ICH, SAH, encephalopathy, uremia, among other causes.     He is nontoxic and well-appearing in the Emergency Department.   He shows no evidence of active infection on exam, labs or vitals.     Labs reviewed and are unremarkable  ECG is nonischemic.  He received 1 L of IV hydration and on reevaluation he was greatly improved, after sleeping for 4 hours in the ED, then ambulating and eating  His point-of-care alcohol was negative    On re-evaluation at 415pm he is well appearing and feels normal    - Patient agrees to our plan and is ready and eager for discharge. Care plan, follow up plan, and reasons to return immediately to the ED were dicussed in detail and summarized as noted in the discharge instructions.      .      I have reviewed the nursing notes.    I have reviewed the findings, diagnosis, plan and need for follow up with the patient.    New Prescriptions    No medications on file       Final diagnoses:   Somnolence   Lightheadedness       10/19/2017   [facility], [place], EMERGENCY DEPARTMENT     Chevy Bernal MD  10/19/17 [id number]

## 2017-10-19 NOTE — ED AVS SNAPSHOT
[facility], Emergency Department    [address] [address]    Phone:  [phone]                                       Andrew Lockwood   MRN: [id number]    Department:  [facility], Emergency Department   Date of Visit:  10/19/2017           Patient Information     Date Of Birth          11/27/1967        Your diagnoses for this visit were:     Somnolence     Lightheadedness        You were seen by Chevy Bernal MD.        Discharge Instructions       Please make an appointment to follow up with Your Primary Care Provider in 7 days even if entirely better.  You can call to discuss the appropriate follow up timing with your doctor.     Return to the emergency department immediately for any chest pain, back pain, shortness of breath, weakness, abdominal pain nausea, vomiting, or any other concerns as given or discussed        24 Hour Appointment Hotline       To make an appointment at any [facility] clinic, call [phone] ([phone]). If you don't have a family doctor or clinic, we will help you find one. [facility] clinics are conveniently located to serve the needs of you and your family.             Review of your medicines      Our records show that you are taking the medicines listed below. If these are incorrect, please call your family doctor or clinic.        Dose / Directions Last dose taken    albuterol 108 (90 BASE) MCG/ACT Inhaler   Commonly known as:  PROAIR HFA/PROVENTIL HFA/VENTOLIN HFA   Dose:  2 puff   Quantity:  1 Inhaler        Inhale 2 puffs into the lungs every 6 hours as needed for shortness of breath / dyspnea or wheezing   Refills:  0        * AZITHROMYCIN PO   Dose:  600 mg        Take 600 mg by mouth once a week   Refills:  0        * azithromycin 600 MG tablet   Commonly known as:  ZITHROMAX   Quantity:  8 tablet        TAKE 2 TABLETS (1200 MG) BY MOUTH ONCE EVERY 7 DAYS   Refills:  11        benzoyl peroxide 5 % Liqd   Quantity:  226 g        Use daily in  shower   Refills:  11        blood glucose lancets standard   Commonly known as:  no brand specified   Quantity:  100 Box        Use to test blood sugar four times daily or as directed.   Refills:  5        blood glucose monitoring meter device kit   Commonly known as:  no brand specified   Quantity:  1 kit        Use to test blood sugar four times daily or as directed.   Refills:  0        blood glucose monitoring test strip   Commonly known as:  no brand specified   Quantity:  100 each        Use to test blood sugars four times daily or as directed   Refills:  5        cyclopentolate 1 % ophthalmic solution   Commonly known as:  CYCLOGYL   Dose:  1 drop   Quantity:  1 Bottle        Place 1 drop Into the left eye 2 times daily   Refills:  0        diclofenac 1 % Gel topical gel   Commonly known as:  VOLTAREN   Quantity:  100 g        Apply 4 grams to knees or 2 grams to hands four times daily using enclosed dosing card.   Refills:  1        dicyclomine 10 MG capsule   Commonly known as:  BENTYL   Dose:  10 mg   Quantity:  240 capsule        Take 1 capsule (10 mg) by mouth 4 times daily (before meals and nightly) /BEDTIME   Refills:  0        Kwhqyrm-Lmkgz-Yixifkki-TenofAF [id number] MG Tabs per tablet   Commonly known as:  GENVOYA   Dose:  1 tablet   Quantity:  30 tablet        Take 1 tablet by mouth daily   Refills:  5        fluticasone 50 MCG/ACT spray   Commonly known as:  FLONASE   Dose:  1 spray   Quantity:  1 Bottle        Spray 1 spray into both nostrils daily   Refills:  1        * gabapentin 300 MG capsule   Commonly known as:  NEURONTIN   Quantity:  90 capsule        TAKE ONE CAPSULE BY MOUTH ON DAY 1, THEN TAKE ONE CAPSULE TWICE DAILY ON DAY 2 THEN TAKE ONE CAPSULE THREE TIMES A DAY.   Refills:  3        * gabapentin 300 MG capsule   Commonly known as:  NEURONTIN   Dose:  300 mg   Quantity:  90 capsule        Take 1 capsule (300 mg) by mouth 3 times daily   Refills:  5        ibuprofen 600 MG tablet    Commonly known as:  ADVIL/MOTRIN   Dose:  600 mg   Quantity:  20 tablet        Take 1 tablet (600 mg) by mouth every 6 hours as needed for moderate pain   Refills:  0        meclizine 25 MG tablet   Commonly known as:  ANTIVERT   Dose:  25 mg   Quantity:  20 tablet        Take 1 tablet (25 mg) by mouth 3 times daily as needed for dizziness   Refills:  1        metFORMIN 500 MG tablet   Commonly known as:  GLUCOPHAGE   Quantity:  120 tablet        TAKE TWO TABLETS BY MOUTH TWICE A DAY WITH MEALS   Refills:  3        minocycline 50 MG capsule   Commonly known as:  MINOCIN/DYNACIN   Dose:  50 mg   Quantity:  60 capsule        Take 1 capsule (50 mg) by mouth 2 times daily   Refills:  0        montelukast 10 MG tablet   Commonly known as:  SINGULAIR   Dose:  10 mg   Quantity:  90 tablet        Take 1 tablet (10 mg) by mouth At Bedtime   Refills:  2        * omeprazole 20 MG CR capsule   Commonly known as:  priLOSEC   Quantity:  60 capsule        TAKE ONE CAPSULE BY MOUTH EVERY DAY   Refills:  3        * omeprazole 20 MG CR capsule   Commonly known as:  priLOSEC   Quantity:  90 capsule        TAKE ONE CAPSULE BY MOUTH EVERY DAY   Refills:  1        polyethylene glycol powder   Commonly known as:  MIRALAX   Dose:  1 capful   Quantity:  527 g        Take 17 g (1 capful) by mouth daily   Refills:  0        prednisoLONE acetate 1 % ophthalmic susp   Commonly known as:  PRED FORTE   Dose:  1 drop   Quantity:  1 Bottle        Place 1 drop Into the left eye 4 times daily   Refills:  0        saline 0.65 % (SOLN) Soln   Commonly known as:  AYR SALINE NASAL DROPS   Dose:  1-2 drop   Quantity:  1 Bottle        Spray 1-2 drops in nostril 2 times daily   Refills:  0        sildenafil 50 MG tablet   Commonly known as:  VIAGRA   Dose:  25 mg   Quantity:  30 tablet        Take 0.5 tablets (25 mg) by mouth daily as needed for erectile dysfunction   Refills:  0        simethicone 125 MG Chew chewable tablet   Commonly known as:  MYLICON    Dose:  125 mg   Quantity:  30 tablet        Take 1 tablet (125 mg) by mouth 4 times daily as needed for intestinal gas   Refills:  0        sulfamethoxazole-trimethoprim 800-160 MG per tablet   Commonly known as:  BACTRIM DS/SEPTRA DS   Quantity:  60 tablet        TAKE 1 TABLET BY MOUTH 2 TIMES DAILY   Refills:  5        * Notice:  This list has 6 medication(s) that are the same as other medications prescribed for you. Read the directions carefully, and ask your doctor or other care provider to review them with you.            Procedures and tests performed during your visit     Alcohol breath test POCT    Basic metabolic panel    CBC with platelets differential    EKG 12 lead    Glucose by meter      Orders Needing Specimen Collection     None      Pending Results     Date and Time Order Name Status Description    10/19/2017 1343 EKG 12 lead Preliminary             Pending Culture Results     No orders found from 10/17/2017 to 10/20/2017.            Pending Results Instructions     If you had any lab results that were not finalized at the time of your Discharge, you can call the ED Lab Result RN at [phone]. You will be contacted by this team for any positive Lab results or changes in treatment. The nurses are available 7 days a week from 10A to 6:30P.  You can leave a message 24 hours per day and they will return your call.        Thank you for choosing [facility]       Thank you for choosing [facility] for your care. Our goal is always to provide you with excellent care. Hearing back from our patients is one way we can continue to improve our services. Please take a few minutes to complete the written survey that you may receive in the mail after you visit with us. Thank you!        [employer] Information     [employer] gives you secure access to your electronic health record. If you see a primary care provider, you can also send messages to your care team and make appointments. If you have questions, please call  your primary care clinic.  If you do not have a primary care provider, please call [phone] and they will assist you.        Care EveryWhere ID     This is your Care EveryWhere ID. This could be used by other organizations to access your [facility] medical records  [id number]        Equal Access to Services     RA JORDAN : Apollo Cody, maverick centeno, luis enrique reyna, dez salazar. So [facility] [phone].    ATENCIÓN: Si habla español, tiene a kohli disposición servicios gratuitos de asistencia lingüística. Llame al [phone].    We comply with applicable federal civil rights laws and [place] laws. We do not discriminate on the basis of race, color, national origin, age, disability, sex, sexual orientation, or gender identity.            After Visit Summary       This is your record. Keep this with you and show to your community pharmacist(s) and doctor(s) at your next visit.

## 2017-10-19 NOTE — ED AVS SNAPSHOT
[facility], [place], Emergency Department    [address] [place] [address]    Phone:  [phone]                                       Andrew Lockwood   MRN: [id number]    Department:  [facility], Emergency Department   Date of Visit:  10/19/2017           After Visit Summary Signature Page     I have received my discharge instructions, and my questions have been answered. I have discussed any challenges I see with this plan with the nurse or doctor.    ..........................................................................................................................................  Patient/Patient Representative Signature      ..........................................................................................................................................  Patient Representative Print Name and Relationship to Patient    ..................................................               ................................................  Date                                            Time    ..........................................................................................................................................  Reviewed by Signature/Title    ...................................................              ..............................................  Date                                                            Time

## 2017-10-19 NOTE — ED NOTES
Presents to ED for various symptoms including fatigue, sweating, dry eyes, dry mouth. Started on Monday. States his blood sugars have been running low between 50-90. Blood glucose in triage 148.

## 2017-10-19 NOTE — DISCHARGE INSTRUCTIONS
Please make an appointment to follow up with Your Primary Care Provider in 7 days even if entirely better.  You can call to discuss the appropriate follow up timing with your doctor.     Return to the emergency department immediately for any chest pain, back pain, shortness of breath, weakness, abdominal pain nausea, vomiting, or any other concerns as given or discussed

## 2017-10-20 LAB — INTERPRETATION ECG - MUSE: NORMAL

## 2017-11-06 ENCOUNTER — HOSPITAL ENCOUNTER (OUTPATIENT)
Facility: CLINIC | Age: 52
Setting detail: OBSERVATION
Discharge: HOME | End: 2017-11-07
Attending: EMERGENCY MEDICINE | Admitting: INTERNAL MEDICINE
Payer: COMMERCIAL

## 2017-11-06 DIAGNOSIS — Z79.4 TYPE 2 DIABETES MELLITUS WITHOUT COMPLICATION, WITH LONG-TERM CURRENT USE OF INSULIN (H): ICD-10-CM

## 2017-11-06 DIAGNOSIS — Z21 ASYMPTOMATIC HUMAN IMMUNODEFICIENCY VIRUS (HIV) INFECTION STATUS (H): ICD-10-CM

## 2017-11-06 DIAGNOSIS — E11.9 TYPE 2 DIABETES MELLITUS WITHOUT COMPLICATION, WITH LONG-TERM CURRENT USE OF INSULIN (H): ICD-10-CM

## 2017-11-06 DIAGNOSIS — R10.13 EPIGASTRIC PAIN: Primary | ICD-10-CM

## 2017-11-06 DIAGNOSIS — R10.9 ABDOMINAL PAIN, UNSPECIFIED ABDOMINAL LOCATION: ICD-10-CM

## 2017-11-06 DIAGNOSIS — R10.84 ABDOMINAL PAIN, GENERALIZED: ICD-10-CM

## 2017-11-06 DIAGNOSIS — R11.2 NAUSEA AND VOMITING, INTRACTABILITY OF VOMITING NOT SPECIFIED, UNSPECIFIED VOMITING TYPE: ICD-10-CM

## 2017-11-06 LAB
BASOPHILS # BLD AUTO: 0 10E9/L (ref 0–0.2)
BASOPHILS NFR BLD AUTO: 0.2 %
DIFFERENTIAL METHOD BLD: NORMAL
EOSINOPHIL # BLD AUTO: 0.1 10E9/L (ref 0–0.7)
EOSINOPHIL NFR BLD AUTO: 1.1 %
ERYTHROCYTE [DISTWIDTH] IN BLOOD BY AUTOMATED COUNT: 13.1 % (ref 10–15)
HCT VFR BLD AUTO: 44.7 % (ref 40–53)
HGB BLD-MCNC: 15.9 G/DL (ref 13.3–17.7)
IMM GRANULOCYTES # BLD: 0.1 10E9/L (ref 0–0.4)
IMM GRANULOCYTES NFR BLD: 0.8 %
LYMPHOCYTES # BLD AUTO: 2.4 10E9/L (ref 0.8–5.3)
LYMPHOCYTES NFR BLD AUTO: 28.8 %
MCH RBC QN AUTO: 32.6 PG (ref 26.5–33)
MCHC RBC AUTO-ENTMCNC: 35.6 G/DL (ref 31.5–36.5)
MCV RBC AUTO: 92 FL (ref 78–100)
MONOCYTES # BLD AUTO: 0.6 10E9/L (ref 0–1.3)
MONOCYTES NFR BLD AUTO: 7.4 %
NEUTROPHILS # BLD AUTO: 5.1 10E9/L (ref 1.6–8.3)
NEUTROPHILS NFR BLD AUTO: 61.7 %
NRBC # BLD AUTO: 0 10*3/UL
NRBC BLD AUTO-RTO: 0 /100
PLATELET # BLD AUTO: 182 10E9/L (ref 150–450)
RBC # BLD AUTO: 4.87 10E12/L (ref 4.4–5.9)
WBC # BLD AUTO: 8.3 10E9/L (ref 4–11)

## 2017-11-06 PROCEDURE — 85025 COMPLETE CBC W/AUTO DIFF WBC: CPT | Performed by: EMERGENCY MEDICINE

## 2017-11-06 PROCEDURE — 84484 ASSAY OF TROPONIN QUANT: CPT | Performed by: EMERGENCY MEDICINE

## 2017-11-06 PROCEDURE — 85610 PROTHROMBIN TIME: CPT | Performed by: EMERGENCY MEDICINE

## 2017-11-06 PROCEDURE — 99285 EMERGENCY DEPT VISIT HI MDM: CPT | Mod: Z6 | Performed by: EMERGENCY MEDICINE

## 2017-11-06 PROCEDURE — 99285 EMERGENCY DEPT VISIT HI MDM: CPT | Performed by: EMERGENCY MEDICINE

## 2017-11-06 PROCEDURE — 80053 COMPREHEN METABOLIC PANEL: CPT | Performed by: EMERGENCY MEDICINE

## 2017-11-06 PROCEDURE — 83690 ASSAY OF LIPASE: CPT | Performed by: EMERGENCY MEDICINE

## 2017-11-07 ENCOUNTER — TELEPHONE (OUTPATIENT)
Dept: GASTROENTEROLOGY | Facility: CLINIC | Age: 52
End: 2017-11-07

## 2017-11-07 ENCOUNTER — APPOINTMENT (OUTPATIENT)
Dept: CT IMAGING | Facility: CLINIC | Age: 52
End: 2017-11-07
Attending: EMERGENCY MEDICINE
Payer: COMMERCIAL

## 2017-11-07 ENCOUNTER — APPOINTMENT (OUTPATIENT)
Dept: GENERAL RADIOLOGY | Facility: CLINIC | Age: 52
End: 2017-11-07
Attending: EMERGENCY MEDICINE
Payer: COMMERCIAL

## 2017-11-07 VITALS
HEIGHT: 64 IN | BODY MASS INDEX: 29.02 KG/M2 | TEMPERATURE: 98.8 F | SYSTOLIC BLOOD PRESSURE: 107 MMHG | WEIGHT: 170 LBS | DIASTOLIC BLOOD PRESSURE: 73 MMHG | RESPIRATION RATE: 16 BRPM | HEART RATE: 86 BPM | OXYGEN SATURATION: 98 %

## 2017-11-07 DIAGNOSIS — E11.9 DM (DIABETES MELLITUS) (H): ICD-10-CM

## 2017-11-07 LAB
ALBUMIN SERPL-MCNC: 3.8 G/DL (ref 3.4–5)
ALBUMIN UR-MCNC: 10 MG/DL
ALP SERPL-CCNC: 146 U/L (ref 40–150)
ALT SERPL W P-5'-P-CCNC: 51 U/L (ref 0–70)
AMPHETAMINES UR QL SCN: NEGATIVE
ANION GAP SERPL CALCULATED.3IONS-SCNC: 10 MMOL/L (ref 3–14)
ANION GAP SERPL CALCULATED.3IONS-SCNC: 8 MMOL/L (ref 3–14)
APPEARANCE UR: CLEAR
AST SERPL W P-5'-P-CCNC: 52 U/L (ref 0–45)
BARBITURATES UR QL: NEGATIVE
BASOPHILS # BLD AUTO: 0 10E9/L (ref 0–0.2)
BASOPHILS NFR BLD AUTO: 0.4 %
BENZODIAZ UR QL: NEGATIVE
BILIRUB SERPL-MCNC: 0.5 MG/DL (ref 0.2–1.3)
BILIRUB UR QL STRIP: NEGATIVE
BUN SERPL-MCNC: 12 MG/DL (ref 7–30)
BUN SERPL-MCNC: 12 MG/DL (ref 7–30)
C COLI+JEJUNI+LARI FUSA STL QL NAA+PROBE: NOT DETECTED
C DIFF TOX B STL QL: NEGATIVE
CALCIUM SERPL-MCNC: 8.8 MG/DL (ref 8.5–10.1)
CALCIUM SERPL-MCNC: 9.3 MG/DL (ref 8.5–10.1)
CANNABINOIDS UR QL SCN: NEGATIVE
CHLORIDE SERPL-SCNC: 102 MMOL/L (ref 94–109)
CHLORIDE SERPL-SCNC: 104 MMOL/L (ref 94–109)
CO2 BLDCOV-SCNC: 29 MMOL/L (ref 21–28)
CO2 SERPL-SCNC: 26 MMOL/L (ref 20–32)
CO2 SERPL-SCNC: 26 MMOL/L (ref 20–32)
COCAINE UR QL: NEGATIVE
COLOR UR AUTO: YELLOW
CREAT SERPL-MCNC: 0.56 MG/DL (ref 0.66–1.25)
CREAT SERPL-MCNC: 0.57 MG/DL (ref 0.66–1.25)
DIFFERENTIAL METHOD BLD: NORMAL
EC STX1 GENE STL QL NAA+PROBE: NOT DETECTED
EC STX2 GENE STL QL NAA+PROBE: NOT DETECTED
ENTERIC PATHOGEN COMMENT: NORMAL
EOSINOPHIL # BLD AUTO: 0.1 10E9/L (ref 0–0.7)
EOSINOPHIL NFR BLD AUTO: 0.8 %
ERYTHROCYTE [DISTWIDTH] IN BLOOD BY AUTOMATED COUNT: 13.1 % (ref 10–15)
ETHANOL UR QL SCN: NEGATIVE
GFR SERPL CREATININE-BSD FRML MDRD: >90 ML/MIN/1.7M2
GFR SERPL CREATININE-BSD FRML MDRD: >90 ML/MIN/1.7M2
GLUCOSE BLDC GLUCOMTR-MCNC: 150 MG/DL (ref 70–99)
GLUCOSE BLDC GLUCOMTR-MCNC: 189 MG/DL (ref 70–99)
GLUCOSE SERPL-MCNC: 123 MG/DL (ref 70–99)
GLUCOSE SERPL-MCNC: 152 MG/DL (ref 70–99)
GLUCOSE UR STRIP-MCNC: NEGATIVE MG/DL
HCT VFR BLD AUTO: 40.7 % (ref 40–53)
HGB BLD-MCNC: 14.1 G/DL (ref 13.3–17.7)
HGB UR QL STRIP: NEGATIVE
IMM GRANULOCYTES # BLD: 0.1 10E9/L (ref 0–0.4)
IMM GRANULOCYTES NFR BLD: 0.6 %
INR PPP: 0.91 (ref 0.86–1.14)
KETONES UR STRIP-MCNC: NEGATIVE MG/DL
LACTATE BLD-SCNC: 2 MMOL/L (ref 0.7–2)
LACTATE BLD-SCNC: 2.2 MMOL/L (ref 0.7–2.1)
LEUKOCYTE ESTERASE UR QL STRIP: NEGATIVE
LIPASE SERPL-CCNC: 196 U/L (ref 73–393)
LYMPHOCYTES # BLD AUTO: 2 10E9/L (ref 0.8–5.3)
LYMPHOCYTES NFR BLD AUTO: 23.6 %
MAGNESIUM SERPL-MCNC: 2 MG/DL (ref 1.6–2.3)
MCH RBC QN AUTO: 31.9 PG (ref 26.5–33)
MCHC RBC AUTO-ENTMCNC: 34.6 G/DL (ref 31.5–36.5)
MCV RBC AUTO: 92 FL (ref 78–100)
MONOCYTES # BLD AUTO: 0.6 10E9/L (ref 0–1.3)
MONOCYTES NFR BLD AUTO: 7.7 %
MUCOUS THREADS #/AREA URNS LPF: PRESENT /LPF
NEUTROPHILS # BLD AUTO: 5.5 10E9/L (ref 1.6–8.3)
NEUTROPHILS NFR BLD AUTO: 66.9 %
NITRATE UR QL: NEGATIVE
NOROV GI+II ORF1-ORF2 JNC STL QL NAA+PR: NOT DETECTED
NRBC # BLD AUTO: 0 10*3/UL
NRBC BLD AUTO-RTO: 0 /100
OPIATES UR QL SCN: POSITIVE
PCO2 BLDV: 41 MM HG (ref 40–50)
PCP UR QL SCN: NEGATIVE
PH BLDV: 7.46 PH (ref 7.32–7.43)
PH UR STRIP: 8 PH (ref 5–7)
PHOSPHATE SERPL-MCNC: 2.6 MG/DL (ref 2.5–4.5)
PLATELET # BLD AUTO: 163 10E9/L (ref 150–450)
PO2 BLDV: 52 MM HG (ref 25–47)
POTASSIUM SERPL-SCNC: 3.2 MMOL/L (ref 3.4–5.3)
POTASSIUM SERPL-SCNC: 3.4 MMOL/L (ref 3.4–5.3)
PROT SERPL-MCNC: 8.2 G/DL (ref 6.8–8.8)
RBC # BLD AUTO: 4.42 10E12/L (ref 4.4–5.9)
RBC #/AREA URNS AUTO: 3 /HPF (ref 0–2)
RVA NSP5 STL QL NAA+PROBE: NOT DETECTED
SALMONELLA SP RPOD STL QL NAA+PROBE: NOT DETECTED
SAO2 % BLDV FROM PO2: 88 %
SHIGELLA SP+EIEC IPAH STL QL NAA+PROBE: NOT DETECTED
SODIUM SERPL-SCNC: 137 MMOL/L (ref 133–144)
SODIUM SERPL-SCNC: 138 MMOL/L (ref 133–144)
SOURCE: ABNORMAL
SP GR UR STRIP: 1 (ref 1–1.03)
SPECIMEN SOURCE: NORMAL
TROPONIN I SERPL-MCNC: <0.015 UG/L (ref 0–0.04)
UROBILINOGEN UR STRIP-MCNC: NORMAL MG/DL (ref 0–2)
V CHOL+PARA RFBL+TRKH+TNAA STL QL NAA+PR: NOT DETECTED
WBC # BLD AUTO: 8.3 10E9/L (ref 4–11)
WBC #/AREA URNS AUTO: <1 /HPF (ref 0–2)
Y ENTERO RECN STL QL NAA+PROBE: NOT DETECTED

## 2017-11-07 PROCEDURE — 25000128 H RX IP 250 OP 636: Performed by: EMERGENCY MEDICINE

## 2017-11-07 PROCEDURE — 83605 ASSAY OF LACTIC ACID: CPT

## 2017-11-07 PROCEDURE — 74177 CT ABD & PELVIS W/CONTRAST: CPT

## 2017-11-07 PROCEDURE — 25000132 ZZH RX MED GY IP 250 OP 250 PS 637: Performed by: PHYSICIAN ASSISTANT

## 2017-11-07 PROCEDURE — 84100 ASSAY OF PHOSPHORUS: CPT | Performed by: PHYSICIAN ASSISTANT

## 2017-11-07 PROCEDURE — 81001 URINALYSIS AUTO W/SCOPE: CPT | Performed by: EMERGENCY MEDICINE

## 2017-11-07 PROCEDURE — 87077 CULTURE AEROBIC IDENTIFY: CPT | Performed by: EMERGENCY MEDICINE

## 2017-11-07 PROCEDURE — 96361 HYDRATE IV INFUSION ADD-ON: CPT | Performed by: EMERGENCY MEDICINE

## 2017-11-07 PROCEDURE — 80307 DRUG TEST PRSMV CHEM ANLYZR: CPT | Performed by: NURSE PRACTITIONER

## 2017-11-07 PROCEDURE — 87493 C DIFF AMPLIFIED PROBE: CPT | Performed by: NURSE PRACTITIONER

## 2017-11-07 PROCEDURE — G0378 HOSPITAL OBSERVATION PER HR: HCPCS

## 2017-11-07 PROCEDURE — 83735 ASSAY OF MAGNESIUM: CPT | Performed by: PHYSICIAN ASSISTANT

## 2017-11-07 PROCEDURE — 25000128 H RX IP 250 OP 636: Performed by: PHYSICIAN ASSISTANT

## 2017-11-07 PROCEDURE — 87177 OVA AND PARASITES SMEARS: CPT | Performed by: NURSE PRACTITIONER

## 2017-11-07 PROCEDURE — 99235 HOSP IP/OBS SAME DATE MOD 70: CPT | Mod: Z6 | Performed by: PHYSICIAN ASSISTANT

## 2017-11-07 PROCEDURE — 87186 SC STD MICRODIL/AGAR DIL: CPT | Performed by: EMERGENCY MEDICINE

## 2017-11-07 PROCEDURE — 87040 BLOOD CULTURE FOR BACTERIA: CPT | Mod: 91 | Performed by: EMERGENCY MEDICINE

## 2017-11-07 PROCEDURE — 85025 COMPLETE CBC W/AUTO DIFF WBC: CPT | Performed by: PHYSICIAN ASSISTANT

## 2017-11-07 PROCEDURE — 83605 ASSAY OF LACTIC ACID: CPT | Performed by: PHYSICIAN ASSISTANT

## 2017-11-07 PROCEDURE — 93005 ELECTROCARDIOGRAM TRACING: CPT | Performed by: EMERGENCY MEDICINE

## 2017-11-07 PROCEDURE — 87506 IADNA-DNA/RNA PROBE TQ 6-11: CPT | Performed by: NURSE PRACTITIONER

## 2017-11-07 PROCEDURE — 25000125 ZZHC RX 250: Performed by: EMERGENCY MEDICINE

## 2017-11-07 PROCEDURE — 87329 GIARDIA AG IA: CPT | Performed by: NURSE PRACTITIONER

## 2017-11-07 PROCEDURE — 80320 DRUG SCREEN QUANTALCOHOLS: CPT | Performed by: NURSE PRACTITIONER

## 2017-11-07 PROCEDURE — 96376 TX/PRO/DX INJ SAME DRUG ADON: CPT

## 2017-11-07 PROCEDURE — 71020 XR CHEST 2 VW: CPT

## 2017-11-07 PROCEDURE — 80048 BASIC METABOLIC PNL TOTAL CA: CPT | Performed by: PHYSICIAN ASSISTANT

## 2017-11-07 PROCEDURE — 00000146 ZZHCL STATISTIC GLUCOSE BY METER IP

## 2017-11-07 PROCEDURE — 87800 DETECT AGNT MULT DNA DIREC: CPT | Performed by: EMERGENCY MEDICINE

## 2017-11-07 PROCEDURE — 87338 HPYLORI STOOL AG IA: CPT | Performed by: NURSE PRACTITIONER

## 2017-11-07 PROCEDURE — 36415 COLL VENOUS BLD VENIPUNCTURE: CPT | Performed by: PHYSICIAN ASSISTANT

## 2017-11-07 PROCEDURE — 36415 COLL VENOUS BLD VENIPUNCTURE: CPT | Performed by: EMERGENCY MEDICINE

## 2017-11-07 PROCEDURE — 87209 SMEAR COMPLEX STAIN: CPT | Performed by: NURSE PRACTITIONER

## 2017-11-07 PROCEDURE — 25000132 ZZH RX MED GY IP 250 OP 250 PS 637: Performed by: EMERGENCY MEDICINE

## 2017-11-07 PROCEDURE — 82803 BLOOD GASES ANY COMBINATION: CPT

## 2017-11-07 PROCEDURE — 96374 THER/PROPH/DIAG INJ IV PUSH: CPT | Performed by: EMERGENCY MEDICINE

## 2017-11-07 RX ORDER — AZITHROMYCIN 600 MG/1
1200 TABLET, FILM COATED ORAL
Status: DISCONTINUED | OUTPATIENT
Start: 2017-11-12 | End: 2017-11-07 | Stop reason: HOSPADM

## 2017-11-07 RX ORDER — SODIUM CHLORIDE 9 MG/ML
INJECTION, SOLUTION INTRAVENOUS CONTINUOUS
Status: DISCONTINUED | OUTPATIENT
Start: 2017-11-07 | End: 2017-11-07 | Stop reason: HOSPADM

## 2017-11-07 RX ORDER — HYDROMORPHONE HYDROCHLORIDE 1 MG/ML
0.5 INJECTION, SOLUTION INTRAMUSCULAR; INTRAVENOUS; SUBCUTANEOUS
Status: COMPLETED | OUTPATIENT
Start: 2017-11-07 | End: 2017-11-07

## 2017-11-07 RX ORDER — HYDROMORPHONE HCL/0.9% NACL/PF 0.2MG/0.2
0.2 SYRINGE (ML) INTRAVENOUS ONCE
Status: COMPLETED | OUTPATIENT
Start: 2017-11-07 | End: 2017-11-07

## 2017-11-07 RX ORDER — SULFAMETHOXAZOLE/TRIMETHOPRIM 800-160 MG
1 TABLET ORAL DAILY
Status: DISCONTINUED | OUTPATIENT
Start: 2017-11-07 | End: 2017-11-07 | Stop reason: HOSPADM

## 2017-11-07 RX ORDER — GABAPENTIN 300 MG/1
300 CAPSULE ORAL 3 TIMES DAILY
Status: DISCONTINUED | OUTPATIENT
Start: 2017-11-07 | End: 2017-11-07 | Stop reason: HOSPADM

## 2017-11-07 RX ORDER — MAGNESIUM SULFATE HEPTAHYDRATE 40 MG/ML
4 INJECTION, SOLUTION INTRAVENOUS EVERY 4 HOURS PRN
Status: DISCONTINUED | OUTPATIENT
Start: 2017-11-07 | End: 2017-11-07 | Stop reason: HOSPADM

## 2017-11-07 RX ORDER — NALOXONE HYDROCHLORIDE 0.4 MG/ML
.1-.4 INJECTION, SOLUTION INTRAMUSCULAR; INTRAVENOUS; SUBCUTANEOUS
Status: DISCONTINUED | OUTPATIENT
Start: 2017-11-07 | End: 2017-11-07 | Stop reason: HOSPADM

## 2017-11-07 RX ORDER — POTASSIUM CHLORIDE 29.8 MG/ML
20 INJECTION INTRAVENOUS
Status: DISCONTINUED | OUTPATIENT
Start: 2017-11-07 | End: 2017-11-07 | Stop reason: RX

## 2017-11-07 RX ORDER — ALUMINA, MAGNESIA, AND SIMETHICONE 2400; 2400; 240 MG/30ML; MG/30ML; MG/30ML
30 SUSPENSION ORAL EVERY 4 HOURS PRN
Status: DISCONTINUED | OUTPATIENT
Start: 2017-11-07 | End: 2017-11-07 | Stop reason: HOSPADM

## 2017-11-07 RX ORDER — IOPAMIDOL 755 MG/ML
105 INJECTION, SOLUTION INTRAVASCULAR ONCE
Status: COMPLETED | OUTPATIENT
Start: 2017-11-07 | End: 2017-11-07

## 2017-11-07 RX ORDER — POTASSIUM CL/LIDO/0.9 % NACL 10MEQ/0.1L
10 INTRAVENOUS SOLUTION, PIGGYBACK (ML) INTRAVENOUS
Status: DISCONTINUED | OUTPATIENT
Start: 2017-11-07 | End: 2017-11-07 | Stop reason: HOSPADM

## 2017-11-07 RX ORDER — POTASSIUM CHLORIDE 750 MG/1
20-40 TABLET, EXTENDED RELEASE ORAL
Status: DISCONTINUED | OUTPATIENT
Start: 2017-11-07 | End: 2017-11-07 | Stop reason: HOSPADM

## 2017-11-07 RX ORDER — SODIUM CHLORIDE, SODIUM LACTATE, POTASSIUM CHLORIDE, CALCIUM CHLORIDE 600; 310; 30; 20 MG/100ML; MG/100ML; MG/100ML; MG/100ML
INJECTION, SOLUTION INTRAVENOUS ONCE
Status: COMPLETED | OUTPATIENT
Start: 2017-11-07 | End: 2017-11-07

## 2017-11-07 RX ORDER — ONDANSETRON 4 MG/1
4 TABLET, ORALLY DISINTEGRATING ORAL EVERY 6 HOURS PRN
Status: DISCONTINUED | OUTPATIENT
Start: 2017-11-07 | End: 2017-11-07 | Stop reason: HOSPADM

## 2017-11-07 RX ORDER — DEXTROSE MONOHYDRATE 25 G/50ML
25-50 INJECTION, SOLUTION INTRAVENOUS
Status: DISCONTINUED | OUTPATIENT
Start: 2017-11-07 | End: 2017-11-07 | Stop reason: HOSPADM

## 2017-11-07 RX ORDER — ONDANSETRON 2 MG/ML
4 INJECTION INTRAMUSCULAR; INTRAVENOUS EVERY 6 HOURS PRN
Status: DISCONTINUED | OUTPATIENT
Start: 2017-11-07 | End: 2017-11-07 | Stop reason: HOSPADM

## 2017-11-07 RX ORDER — POTASSIUM CHLORIDE 1.5 G/1.58G
20-40 POWDER, FOR SOLUTION ORAL
Status: DISCONTINUED | OUTPATIENT
Start: 2017-11-07 | End: 2017-11-07 | Stop reason: HOSPADM

## 2017-11-07 RX ORDER — POTASSIUM CHLORIDE 7.45 MG/ML
10 INJECTION INTRAVENOUS
Status: DISCONTINUED | OUTPATIENT
Start: 2017-11-07 | End: 2017-11-07 | Stop reason: HOSPADM

## 2017-11-07 RX ORDER — NICOTINE POLACRILEX 4 MG
15-30 LOZENGE BUCCAL
Status: DISCONTINUED | OUTPATIENT
Start: 2017-11-07 | End: 2017-11-07 | Stop reason: HOSPADM

## 2017-11-07 RX ADMIN — SODIUM CHLORIDE, POTASSIUM CHLORIDE, SODIUM LACTATE AND CALCIUM CHLORIDE 1000 ML: 600; 310; 30; 20 INJECTION, SOLUTION INTRAVENOUS at 03:00

## 2017-11-07 RX ADMIN — ALUMINUM HYDROXIDE, MAGNESIUM HYDROXIDE, AND DIMETHICONE 30 ML: 400; 400; 40 SUSPENSION ORAL at 07:03

## 2017-11-07 RX ADMIN — SODIUM CHLORIDE, POTASSIUM CHLORIDE, SODIUM LACTATE AND CALCIUM CHLORIDE: 600; 310; 30; 20 INJECTION, SOLUTION INTRAVENOUS at 01:41

## 2017-11-07 RX ADMIN — SODIUM CHLORIDE 80 ML: 9 INJECTION, SOLUTION INTRAVENOUS at 01:56

## 2017-11-07 RX ADMIN — SULFAMETHOXAZOLE AND TRIMETHOPRIM 1 TABLET: 800; 160 TABLET ORAL at 09:04

## 2017-11-07 RX ADMIN — Medication 0.2 MG: at 07:03

## 2017-11-07 RX ADMIN — GABAPENTIN 300 MG: 300 CAPSULE ORAL at 09:04

## 2017-11-07 RX ADMIN — IOPAMIDOL 105 ML: 755 INJECTION, SOLUTION INTRAVENOUS at 01:57

## 2017-11-07 RX ADMIN — SODIUM CHLORIDE: 9 INJECTION, SOLUTION INTRAVENOUS at 07:13

## 2017-11-07 RX ADMIN — HYDROMORPHONE HYDROCHLORIDE 0.5 MG: 1 INJECTION, SOLUTION INTRAMUSCULAR; INTRAVENOUS; SUBCUTANEOUS at 02:09

## 2017-11-07 RX ADMIN — ELVITEGRAVIR, COBICISTAT, EMTRICITABINE, AND TENOFOVIR ALAFENAMIDE 1 TABLET: 150; 150; 200; 10 TABLET ORAL at 09:03

## 2017-11-07 RX ADMIN — LIDOCAINE HYDROCHLORIDE 30 ML: 20 SOLUTION ORAL; TOPICAL at 01:12

## 2017-11-07 ASSESSMENT — ENCOUNTER SYMPTOMS
DYSPHORIC MOOD: 0
BACK PAIN: 0
ADENOPATHY: 0
VOMITING: 1
WEAKNESS: 0
LIGHT-HEADEDNESS: 0
HEADACHES: 0
NECK PAIN: 0
EYE PAIN: 0
SHORTNESS OF BREATH: 0
VOICE CHANGE: 0
NUMBNESS: 0
SORE THROAT: 0
NAUSEA: 0
DIAPHORESIS: 0
DIZZINESS: 0
POLYDIPSIA: 0
TROUBLE SWALLOWING: 0
BLOOD IN STOOL: 0
FREQUENCY: 0
COUGH: 0
FEVER: 0
ABDOMINAL PAIN: 1
CONSTIPATION: 0
BRUISES/BLEEDS EASILY: 0
PALPITATIONS: 0
HEMATURIA: 0
DIARRHEA: 0
DYSURIA: 0
COLOR CHANGE: 0
NERVOUS/ANXIOUS: 0
CHILLS: 0

## 2017-11-07 ASSESSMENT — ACTIVITIES OF DAILY LIVING (ADL)
COGNITION: 0 - NO COGNITION ISSUES REPORTED
SWALLOWING: 0-->SWALLOWS FOODS/LIQUIDS WITHOUT DIFFICULTY
FALL_HISTORY_WITHIN_LAST_SIX_MONTHS: NO
BATHING: 0-->INDEPENDENT
DRESS: 0-->INDEPENDENT
TRANSFERRING: 0-->INDEPENDENT
RETIRED_COMMUNICATION: 0-->UNDERSTANDS/COMMUNICATES WITHOUT DIFFICULTY
AMBULATION: 0-->INDEPENDENT
TOILETING: 0-->INDEPENDENT
RETIRED_EATING: 0-->INDEPENDENT

## 2017-11-07 NOTE — PHARMACY-ADMISSION MEDICATION HISTORY
Admission medication history interview status for the 11/6/2017 admission is complete. See Epic admission navigator for allergy information, pharmacy, prior to admission medications and immunization status.     Medication history interview sources:  Patient, [facility] (fills at [facility])    Changes made to PTA medication list (reason)  Added: None  Deleted: Albuterol HFA (has not had/used in 3 years), azithromycin (duplicate), benzoyl peroxide (does not use), gabapentin (taper up complete now on full dose, so deleted duplicate), ibuprofen (claims to never use), meclizine (does not use), omeprazole (duplicate), miralax (never used), prednisolone acetate susp (stopped over a month ago/complete tx), ayr nasal drops (has not used in over a year), sildenafil (never used and has no plans to use), simethicone (never used)  Changed: azithromycin 1200 mg weekly --> 600 mg weekly, bactrim DS 1 tab BID --> 4 tabs BID    Additional medication history information (including reliability of information, actions taken by pharmacist):    Patient oriented, but very mumbly and required a lot of clarification and repetition.  He had his arm over his head and his eyes shut for the majority of the interview.  Shook his finger when he was saying no.  He claims to ONLY fill at [facility] in [facility], and does not use any other pharmacies.      1. Verified allergies.  2. Patient claims he takes azithromycin on Sundays, but only 1 tablet.  Last fill date in [facility] was 8/1/17 for 8 tablets (should be a month supply, but even if taking as reported would have been empty by 9/24/17).  3. Patient also states he is taking 4 tablets of bactrim BID.  Last fill date in [facility] was 8/1/17 for 60 tablets.  (should also only be a month supply, which would have only been 2 weeks if taking as reported).  4. Genvoya was last filled 8/1/17 for a 30 day supply and metformin has not been filled at [facility] in over 6 months, but he claims to  still be taking this as well.  5. All other medications match with prescribed quantities and fill dates.   6. Insurance lists some medication fills in [place], [place] at [facility], but did not have a chance to discuss whether he recently moved and established care in [place]. He denied taking montelukast and pantoprazole, but I did not ask about trazodone, escitalopram, or dicyclomine.  6. Does not remember last flu shot; [facility] lists 10/17/16.      Prior to Admission medications    Medication Sig Last Dose Taking? Auth Provider   GENVOYA [id number] mg tablet Take 1 tablet by mouth daily 11/7/2017 at AM Yes Sharon Rosado MD   metFORMIN (GLUCOPHAGE) 500 MG tablet TAKE TWO TABLETS BY MOUTH TWICE A DAY WITH MEALS 11/6/2017 at Unknown time Yes Sharon Rosado MD   omeprazole (PRILOSEC) 20 MG CR capsule TAKE ONE CAPSULE BY MOUTH EVERY DAY 11/6/2017 at AM Yes Sharon Rosado MD   azithromycin (ZITHROMAX) 600 MG tablet TAKE 2 TABLETS (1200 MG) BY MOUTH ONCE EVERY 7 DAYS  Patient taking differently: TAKE 1 TABLET (600 mg) BY MOUTH ONCE EVERY 7 DAYS 11/5/2017  Sharon Rosado MD   sulfamethoxazole-trimethoprim (BACTRIM DS/SEPTRA DS) 800-160 MG per tablet TAKE 1 TABLET BY MOUTH 2 TIMES DAILY  Patient taking differently: 4 tablets 2 times daily TAKE 1 TABLET BY MOUTH 2 TIMES DAILY 11/7/2017 at 0900  Sharon Rosado MD   gabapentin (NEURONTIN) 300 MG capsule Take 1 capsule (300 mg) by mouth 3 times daily 11/7/2017 at AM  Sharon Rosado MD   blood glucose monitoring (NO BRAND SPECIFIED) meter device kit Use to test blood sugar four times daily or as directed.   Julissa Silver MD   blood glucose monitoring (NO BRAND SPECIFIED) test strip Use to test blood sugars four times daily or as directed   Julissa Silver MD   blood glucose (NO BRAND SPECIFIED) lancets standard Use to test blood sugar four times daily or as directed.   Nadeem  Julissa Menendez MD         Medication history completed by:     Herminia Lawson  Pharmacy APPE Student

## 2017-11-07 NOTE — PROVIDER NOTIFICATION
Pt stated that he is leaving the hospital and was ready to remove the piv. Writer strongly encouraged pt to stay. The provider was informed and will come to see the pt.

## 2017-11-07 NOTE — TELEPHONE ENCOUNTER
Per Dr Rosado via face to face conversation in clinic, OK to put in order for Metformin.  Dalila Wilson RN

## 2017-11-07 NOTE — H&P
"[facility] ED Observation Admission Note    Chief Complaint   Patient presents with     Abdominal Pain     Nausea & Vomiting     Dizziness       Assessment/Plan:  1. Abdominal Pain, Nausea, Vomiting:  h/o chronic abdominal pain; now 2 days of left upper quadrant and side pain. Admits to nausea and an episode of emesis, non-bilious, while in the ED today. No diarrhea, bloody stools or melena. Denies fever, chills, urinary symptoms, h/o cholecystectomy or appendectomy, recent medication changes, recent travel. In ED, VSS, afebrile. CMP with BUN/Creat 12/0.57, glucose 152, AST 52 otherwise normal. Troponin I negative x 1. Normal lipase. Lactic acid 2.2. Normal CBC and INR. EKG negative. CT abdomen pelvis reports: \"1. Circumferential thickening of the wall of the pylorus, possibly representing gastritis versus sequelae of peptic ulcer disease. 2. Prostatomegaly with circumferential bladder wall thickening suggestive of chronic outlet obstruction. 3. Single dilated loop of small bowel, nonspecific and partial obstruction is difficult to exclude.\". CXR negative. In the ED the patient was given dilaudid 0.5mg IV x 1, 1L LR bolus, GI cocktail x 1. Upon evaluation in the ED he stated he continued to have pain.   - Serial abdominal exams  - Zofran prn nausea  - Protonix 40mg IV BID  - Repeat lactic acid, cbc, bmp  - NPO on MIVF with NS at 125ml/hr  - Consider GI consult if not improving    2. DM2: On metformin 1000mg BID. A1c 6.0 on 8/11/17  - Hold metformin while NPO  - BG q4h  - SSI medium resistance with Novolog q4h    Chronic Problems:  ## HIV/AIDS: - Continue PTA Genoya, Bactrim, Azithromycin    HPI:  (patient is sarcastic and rude and difficult to get a history from. He is mumbling and on his phone the entire time. He reckons he has repeated the same thing several times. Most of the history is being adopted from ED note and chart review)     Andrew Lockwood is a 49 year old male with a history of HIV/AIDS, CNS toxoplasmosis, " "chronic abdominal pain, DM2, GERD, and recurrent SBO who presented to the ED for evaluation of nausea, vomiting, and abdominal pain. He reports a history of chronic abdominal pain and problems but this current episode started 2 days ago mostly in the left upper quadrant and sides. He reported to the ED that his symptoms were exacerbated by raw vegetables and nothing seem to chen it. He reports reflux. Admits to nausea and an episode of emesis, non-bilious, while in the ED today. Denies any diarrhea, bloody stools or melena. Denies fever, chills, urinary symptoms, h/o cholecystectomy or appendectomy, recent medication changes, recent travel. On chart review it is noted that he has had a diagnostic laparatomy and when asked he states \"they have looked inside, up, down and can't find anything\". Last time he at anything and kept it down was 2 days ago. Emesis is not related to eating.     Last small bowel enteroscopy on 1/18/16: - LA Grade A reflux esophagitis.   - White plaques mucosa in the esophagus. Biopsied.   - Striped and patchy moderately erythematous mucosa without bleeding was found in the gastric body and in the gastric antrum. Some areas appeared with more discrete beefy red erythema. There were a few scattered small erosions. Biopsied.    Last colonoscopy on 1/22/16: - The entire examined colon is normal.   - The examined portion of the ileum was normal.   - Based on this exam, no etiology of small bowel thickening was noted. We were unable to reach the area of concern.     In the ED, VSS and afebrile. Labs show CMP with BUN/Creat 12/0.57, glucose 152, AST 52 otherwise normal. Troponin I negative x 1. Normal lipase. Lactic acid 2.2. Normal CBC and INR. EKG negative. CT abdomen pelvis reports: \"1. Circumferential thickening of the wall of the pylorus, possibly representing gastritis versus sequelae of peptic ulcer disease. 2. Prostatomegaly with circumferential bladder wall thickening suggestive of chronic " "outlet obstruction. 3. Single dilated loop of small bowel, nonspecific and partial obstruction is difficult to exclude.\". CXR negative. In the ED the patient was given dilaudid 0.5mg IV x 1, 1L LR bolus, GI cocktail x 1. Upon evaluation in the ED he stated he continued to have pain. He is being admitted under ED Observation status for serial abdominal exams.        History:    Past Medical History:   Diagnosis Date     AIDS (H)      Allergic rhinitis due to other allergen     DNS     Chronic abdominal pain      CNS toxoplasmosis (H)      Diabetes type 2, controlled (H)      GERD (gastroesophageal reflux disease)      HIV (human immunodeficiency virus infection) (H)      Periungual wart      Sleep apnea     doesn't use CPAP       Past Surgical History:   Procedure Laterality Date     C NONSPECIFIC PROCEDURE      right forearm fracture     COLONOSCOPY Left 1/22/2016    Procedure: COMBINED COLONOSCOPY, SINGLE OR MULTIPLE BIOPSY/POLYPECTOMY BY BIOPSY;  Surgeon: Clark Saini MD;  Location: [facility]     HC EXPLORE UNDESC TESTIS,INGUIN/SCROTAL       LAPAROSCOPY DIAGNOSTIC (GENERAL) N/A 7/26/2016    Procedure: LAPAROSCOPY DIAGNOSTIC (GENERAL);  Surgeon: Susannah Arriaga MD;  Location: [facility] OR     OPTICAL TRACKING SYSTEM CRANIOTOMY, EXCISE TUMOR, COMBINED Left 4/10/2015    Procedure: COMBINED OPTICAL TRACKING SYSTEM CRANIOTOMY, EXCISE TUMOR;  Surgeon: Mirlande Colmenares MD;  Location: [facility] OR     REPAIR GAMEKEEPER'S THUMB Right 12/2/2016    Procedure: REPAIR LIGAMENT ULNAR COLLATERAL THUMB (GAMEKEEPER'S);  Surgeon: Evin Zamorano MD;  Location: [facility]       Family History   Problem Relation Age of Onset     DIABETES Brother      DIABETES Father      Alzheimer Disease Father      Unknown/Adopted Mother      DIABETES Paternal Grandfather      CANCER No family hx of      no skin cancer     Skin Cancer No family hx of      no famiy hx of skin cancer       Social History     Social History     Marital status: Single " "    Spouse name: N/A     Number of children: N/A     Years of education: N/A     Occupational History           5 years; temp agency     Social History Main Topics     Smoking status: Current Every Day Smoker     Packs/day: 0.20     Last attempt to quit: 10/28/2016     Smokeless tobacco: Former User      Comment: 4 cigarettes     Alcohol use No      Comment: Last etoh in 2007     Drug use: Yes     Special: Marijuana      Comment: \"Not very often\"     Sexual activity: Not Currently     Partners: Female, Male      Comment: Last sexual activity 2008     Other Topics Concern     Not on file     Social History Narrative    Born in [facility].  Came to the [place] in 1993.  Last traveled to visit family in 2008.              No current facility-administered medications on file prior to encounter.   Current Outpatient Prescriptions on File Prior to Encounter:  ibuprofen (ADVIL/MOTRIN) 600 MG tablet Take 1 tablet (600 mg) by mouth every 6 hours as needed for moderate pain   azithromycin (ZITHROMAX) 600 MG tablet TAKE 2 TABLETS (1200 MG) BY MOUTH ONCE EVERY 7 DAYS   sulfamethoxazole-trimethoprim (BACTRIM DS/SEPTRA DS) 800-160 MG per tablet TAKE 1 TABLET BY MOUTH 2 TIMES DAILY   gabapentin (NEURONTIN) 300 MG capsule Take 1 capsule (300 mg) by mouth 3 times daily   omeprazole (PRILOSEC) 20 MG CR capsule TAKE ONE CAPSULE BY MOUTH EVERY DAY   GENVOYA [phone] mg tablet Take 1 tablet by mouth daily   polyethylene glycol (MIRALAX) powder Take 17 g (1 capful) by mouth daily   metFORMIN (GLUCOPHAGE) 500 MG tablet TAKE TWO TABLETS BY MOUTH TWICE A DAY WITH MEALS   omeprazole (PRILOSEC) 20 MG CR capsule TAKE ONE CAPSULE BY MOUTH EVERY DAY   simethicone (MYLICON) 125 MG CHEW chewable tablet Take 1 tablet (125 mg) by mouth 4 times daily as needed for intestinal gas   blood glucose monitoring (NO BRAND SPECIFIED) meter device kit Use to test blood sugar four times daily or as directed.   blood glucose monitoring (NO " BRAND SPECIFIED) test strip Use to test blood sugars four times daily or as directed   blood glucose (NO BRAND SPECIFIED) lancets standard Use to test blood sugar four times daily or as directed.   meclizine (ANTIVERT) 25 MG tablet Take 1 tablet (25 mg) by mouth 3 times daily as needed for dizziness   prednisoLONE acetate (PRED FORTE) 1 % ophthalmic susp Place 1 drop Into the left eye 4 times daily   saline (AYR SALINE NASAL DROPS) 0.65 % (SOLN) SOLN Spray 1-2 drops in nostril 2 times daily   benzoyl peroxide 5 % LIQD Use daily in shower   albuterol (PROAIR HFA/PROVENTIL HFA/VENTOLIN HFA) 108 (90 BASE) MCG/ACT Inhaler Inhale 2 puffs into the lungs every 6 hours as needed for shortness of breath / dyspnea or wheezing   sildenafil (VIAGRA) 50 MG cap/tab Take 0.5 tablets (25 mg) by mouth daily as needed for erectile dysfunction   gabapentin (NEURONTIN) 300 MG capsule TAKE ONE CAPSULE BY MOUTH ON DAY 1, THEN TAKE ONE CAPSULE TWICE DAILY ON DAY 2 THEN TAKE ONE CAPSULE THREE TIMES A DAY. (Patient taking differently: No sig reported)   AZITHROMYCIN PO Take 600 mg by mouth once a week       Data:    Results for orders placed or performed during the hospital encounter of 11/06/17   CT Abdomen Pelvis w Contrast    Narrative    1. Circumferential thickening of the wall of the pylorus, possibly  representing gastritis versus sequelae of peptic ulcer disease.  2. Prostatomegaly with circumferential bladder wall thickening  suggestive of chronic outlet obstruction.   3. Single dilated loop of small bowel, nonspecific and partial  obstruction is difficult to exclude.   XR Chest 2 Views    Narrative    XR CHEST 2 VW  11/7/2017 2:26 AM      HISTORY: epigastric pain radiating to chest    COMPARISON: 5/9/2017    FINDINGS: PA and lateral views of the chest. No focal airspace  opacities. No pneumothorax or pleural effusion. Cardiomediastinal  silhouette is normal.      Impression    IMPRESSION: No acute cardiopulmonary findings.   CBC  with platelets differential   Result Value Ref Range    WBC 8.3 4.0 - 11.0 10e9/L    RBC Count 4.87 4.4 - 5.9 10e12/L    Hemoglobin 15.9 13.3 - 17.7 g/dL    Hematocrit 44.7 40.0 - 53.0 %    MCV 92 78 - 100 fl    MCH 32.6 26.5 - 33.0 pg    MCHC 35.6 31.5 - 36.5 g/dL    RDW 13.1 10.0 - 15.0 %    Platelet Count 182 150 - 450 10e9/L    Diff Method Automated Method     % Neutrophils 61.7 %    % Lymphocytes 28.8 %    % Monocytes 7.4 %    % Eosinophils 1.1 %    % Basophils 0.2 %    % Immature Granulocytes 0.8 %    Nucleated RBCs 0 0 /100    Absolute Neutrophil 5.1 1.6 - 8.3 10e9/L    Absolute Lymphocytes 2.4 0.8 - 5.3 10e9/L    Absolute Monocytes 0.6 0.0 - 1.3 10e9/L    Absolute Eosinophils 0.1 0.0 - 0.7 10e9/L    Absolute Basophils 0.0 0.0 - 0.2 10e9/L    Abs Immature Granulocytes 0.1 0 - 0.4 10e9/L    Absolute Nucleated RBC 0.0    Comprehensive metabolic panel   Result Value Ref Range    Sodium 137 133 - 144 mmol/L    Potassium 3.4 3.4 - 5.3 mmol/L    Chloride 102 94 - 109 mmol/L    Carbon Dioxide 26 20 - 32 mmol/L    Anion Gap 10 3 - 14 mmol/L    Glucose 152 (H) 70 - 99 mg/dL    Urea Nitrogen 12 7 - 30 mg/dL    Creatinine 0.57 (L) 0.66 - 1.25 mg/dL    GFR Estimate >90 >60 mL/min/1.7m2    GFR Estimate If Black >90 >60 mL/min/1.7m2    Calcium 9.3 8.5 - 10.1 mg/dL    Bilirubin Total 0.5 0.2 - 1.3 mg/dL    Albumin 3.8 3.4 - 5.0 g/dL    Protein Total 8.2 6.8 - 8.8 g/dL    Alkaline Phosphatase 146 40 - 150 U/L    ALT 51 0 - 70 U/L    AST 52 (H) 0 - 45 U/L   Lipase   Result Value Ref Range    Lipase 196 73 - 393 U/L   INR   Result Value Ref Range    INR 0.91 0.86 - 1.14   Troponin I   Result Value Ref Range    Troponin I ES <0.015 0.000 - 0.045 ug/L   ISTAT gases lactate eriberto POCT   Result Value Ref Range    Ph Venous 7.46 (H) 7.32 - 7.43 pH    PCO2 Venous 41 40 - 50 mm Hg    PO2 Venous 52 (H) 25 - 47 mm Hg    Bicarbonate Venous 29 (H) 21 - 28 mmol/L    O2 Sat Venous 88 %    Lactic Acid 2.2 (H) 0.7 - 2.1 mmol/L             EKG  Interpretation:      EKG Number: 1  Interpreted by Lay Felix PA-C   Symptoms at time of EKG: Abdominal pain, nausea, vomiting and dizziness  Rhythm: normal sinus   Rate: 82 bpm  Axis: normal  Ectopy: none  Conduction: normal  ST Segments/ T Waves: No ST-T wave changes  Q Waves: none  Comparison to prior: Unchanged from 10/19/17  Clinical Impression: normal EKG    ROS:    Review Of Systems  Skin: negative  Eyes: negative  Ears/Nose/Throat: negative  Respiratory: No shortness of breath, dyspnea on exertion, cough, or hemoptysis  Cardiovascular: negative  Gastrointestinal: positive for poor appetite, nausea, vomiting, heartburn and abdominal pain, negative for, hematemesis, melena and hematochezia  Genitourinary: negative  Musculoskeletal: negative  Neurologic: negative  Psychiatric: negative  Hematologic/Lymphatic/Immunologic: negative for, chills and fever  Endocrine: negative  PCP: Dr. Rodríguez    10 point ROS negative other than the symptoms noted above.      Exam:  Vitals:  B/P: 137/94, T: 98.5, P: 88, R: 14    Constitutional: healthy, alert, no distress and uncooperative  Head: Normocephalic. No masses, lesions, tenderness or abnormalities  Neck: Neck supple. No adenopathy. Thyroid symmetric, normal size,, Carotids without bruits.  ENT: ENT exam normal, no neck nodes or sinus tenderness  Cardiovascular: negative, PMI normal. No lifts, heaves, or thrills. RRR. No murmurs, clicks gallops or rub  Respiratory: negative, Percussion normal. Good diaphragmatic excursion. Lungs clear  Gastrointestinal: Abdomen soft, tenderness in epigastric region, LUQ and left side.. BS normal. No masses, organomegaly  : Deferred  Musculoskeletal: extremities normal- no gross deformities noted, gait normal and normal muscle tone  Skin: no suspicious lesions or rashes  Neurologic: Gait normal. Reflexes normal and symmetric. Sensation grossly WNL.  Psychiatric: mentation appears normal and affect  normal/bright  Hematologic/Lymphatic/Immunologic: normal ant/post cervical, axillary, supraclavicular and inguinal nodes    Consults: none  FEN: npo; MIVF with NS at 125ml/hr  DVT prophylaxis: encourage ambulation; expect short stay  GI prophylaxis: protonix  BM prophylaxis: none  Code Status: full code  Disposition: home once pain is improved and controlled on oral pain medication, stable labs and vitals    Signed:  Lay Felix PA-C   November 7, 2017 at 3:42 AM

## 2017-11-07 NOTE — ED PROVIDER NOTES
History     Chief Complaint   Patient presents with     Abdominal Pain     Nausea & Vomiting     Dizziness     HPI  Andrew Lockwood is a 49 year old male with a history of HIV/AIDS, CNS toxoplasmosis, chronic abdominal pain, type II DM, GERD, and recurrent SBO who presents for evaluation of nausea, vomiting, and abdominal pain. The patient reports that he deals with abdominal pain chronically. He states that his most recent episode of abdominal cramping began yesterday and has been intractable. He localizes pain to the upper abdomen, worst in the left upper quadrant. He also reports some separate heartburn-type discomfort as well and states that he does deal with GERD chronically. With persistence of symptoms, he presents now for evaluation. The patient states he's noted no palliating symptoms; he's noted his pain was provoked with consumption of raw vegetables, no other provoking symptoms noted. The patient states that his last flare of abdominal pain was a few weeks ago, and before that was about a month ago. The patient denies a change in stool habit, reporting no diarrhea or any bloody stools. He does report an episode of vomiting while here in the ER, which was of non-bloody, non-bilious emesis. The patient also denies any urinary changes. The patient reports he's had no prior abdominal surgeries and he denies a history of colitis or diverticulitis. The patient states he's had no recent medication changes. Please see ROS for further details.    No current facility-administered medications for this encounter.      Current Outpatient Prescriptions   Medication     ibuprofen (ADVIL/MOTRIN) 600 MG tablet     diclofenac (VOLTAREN) 1 % GEL topical gel     azithromycin (ZITHROMAX) 600 MG tablet     sulfamethoxazole-trimethoprim (BACTRIM DS/SEPTRA DS) 800-160 MG per tablet     dicyclomine (BENTYL) 10 MG capsule     montelukast (SINGULAIR) 10 MG tablet     gabapentin (NEURONTIN) 300 MG capsule     omeprazole (PRILOSEC)  20 MG CR capsule     GENVOYA [phone] mg tablet     polyethylene glycol (MIRALAX) powder     metFORMIN (GLUCOPHAGE) 500 MG tablet     omeprazole (PRILOSEC) 20 MG CR capsule     simethicone (MYLICON) 125 MG CHEW chewable tablet     blood glucose monitoring (NO BRAND SPECIFIED) meter device kit     blood glucose monitoring (NO BRAND SPECIFIED) test strip     blood glucose (NO BRAND SPECIFIED) lancets standard     meclizine (ANTIVERT) 25 MG tablet     cyclopentolate (CYCLOGYL) 1 % ophthalmic solution     prednisoLONE acetate (PRED FORTE) 1 % ophthalmic susp     saline (AYR SALINE NASAL DROPS) 0.65 % (SOLN) SOLN     minocycline (MINOCIN/DYNACIN) 50 MG capsule     benzoyl peroxide 5 % LIQD     fluticasone (FLONASE) 50 MCG/ACT spray     albuterol (PROAIR HFA/PROVENTIL HFA/VENTOLIN HFA) 108 (90 BASE) MCG/ACT Inhaler     sildenafil (VIAGRA) 50 MG cap/tab     gabapentin (NEURONTIN) 300 MG capsule     AZITHROMYCIN PO     Past Medical History:   Diagnosis Date     AIDS (H)      Allergic rhinitis due to other allergen     DNS     Chronic abdominal pain      CNS toxoplasmosis (H)      Diabetes type 2, controlled (H)      GERD (gastroesophageal reflux disease)      HIV (human immunodeficiency virus infection) (H)      Periungual wart      Sleep apnea     doesn't use CPAP       Past Surgical History:   Procedure Laterality Date     C NONSPECIFIC PROCEDURE      right forearm fracture     COLONOSCOPY Left 1/22/2016    Procedure: COMBINED COLONOSCOPY, SINGLE OR MULTIPLE BIOPSY/POLYPECTOMY BY BIOPSY;  Surgeon: Clark Saini MD;  Location: [facility]     HC EXPLORE UNDESC TESTIS,INGUIN/SCROTAL       LAPAROSCOPY DIAGNOSTIC (GENERAL) N/A 7/26/2016    Procedure: LAPAROSCOPY DIAGNOSTIC (GENERAL);  Surgeon: Susannah Arriaga MD;  Location: [facility] OR     OPTICAL TRACKING SYSTEM CRANIOTOMY, EXCISE TUMOR, COMBINED Left 4/10/2015    Procedure: COMBINED OPTICAL TRACKING SYSTEM CRANIOTOMY, EXCISE TUMOR;  Surgeon: Mirlande Colmenares MD;   Location: [facility] OR     REPAIR GAMEKEEPER'S THUMB Right 12/2/2016    Procedure: REPAIR LIGAMENT ULNAR COLLATERAL THUMB (GAMEKEEPER'S);  Surgeon: Evin Zamorano MD;  Location: [facility] OR       Family History   Problem Relation Age of Onset     DIABETES Brother      DIABETES Father      Alzheimer Disease Father      Unknown/Adopted Mother      DIABETES Paternal Grandfather      CANCER No family hx of      no skin cancer     Skin Cancer No family hx of      no famiy hx of skin cancer       Social History   Substance Use Topics     Smoking status: Current Every Day Smoker     Packs/day: 0.25     Last attempt to quit: 10/28/2016     Smokeless tobacco: Former User      Comment: just smoked very rarely/socailly in the past     Alcohol use No      Comment: Last etoh in 2007        Allergies   Allergen Reactions     Milk [Lac Bovis] Hives     Tylenol [Acetaminophen] Itching       I have reviewed the Medications, Allergies, Past Medical and Surgical History, and Social History in the Epic system.    Review of Systems   Constitutional: Negative for chills, diaphoresis and fever.   HENT: Negative for ear pain, sore throat, tinnitus, trouble swallowing and voice change.    Eyes: Negative for pain and visual disturbance.   Respiratory: Negative for cough and shortness of breath.    Cardiovascular: Negative for chest pain, palpitations and leg swelling.   Gastrointestinal: Positive for abdominal pain (LUQ pain, as well as reflux-type pain in the epigastric region and more superiorly) and vomiting (x1 episode, NBNB). Negative for blood in stool, constipation, diarrhea and nausea.   Endocrine: Negative for polydipsia and polyuria.   Genitourinary: Negative for dysuria, frequency, hematuria and urgency.   Musculoskeletal: Negative for back pain and neck pain.   Skin: Negative for color change and rash.   Allergic/Immunologic: Negative for immunocompromised state.   Neurological: Negative for dizziness, weakness, light-headedness,  "numbness and headaches.   Hematological: Negative for adenopathy. Does not bruise/bleed easily.   Psychiatric/Behavioral: Negative for dysphoric mood. The patient is not nervous/anxious.    All other systems reviewed and are negative.      Physical Exam   BP: 141/88  Pulse: 88  Temp: 98.5  F (36.9  C)  Resp: 14  Height: 162.6 cm (5' 4\")  Weight: 77.1 kg (170 lb)  SpO2: 100 %      Physical Exam  CONSTITUTIONAL: Well-developed and well-nourished. Awake and alert. Non-toxic appearance. No acute distress.   HENT:   - Head: Normocephalic and atraumatic.   - Ears: Hearing and external ear grossly normal.   - Nose: Nose normal. No rhinorrhea. No epistaxis.   - Mouth/Throat: Oropharynx is clear and MMM  EYES: Conjunctivae and lids are normal. No scleral icterus.   NECK: Normal range of motion and phonation normal. Neck supple.  No tracheal deviation, no stridor. No edema or erythema noted.  CARDIOVASCULAR: Normal rate, regular rhythm and no appreciable abnormal heart sounds.  PULMONARY/CHEST: Effort normal. No accessory muscle usage or stridor. No respiratory distress.  No appreciable abnormal breath sounds.  ABDOMEN: Soft, non-distended. Mid and left-sided abdominal discomfort to palpation. No palpable masses, no peritoneal findings, no rigidity, rebound or guarding.   MUSCULOSKELETAL: Extremities warm and seemingly well perfused. No edema or calf tenderness.  NEUROLOGIC: Awake, alert. Not disoriented.  Normal tone. No seizure activity. Coordination normal. GCS 15  SKIN: Skin is warm and dry. No rash noted. No diaphoresis. No pallor.   PSYCHIATRIC: Normal mood and affect. Speech and behavior normal. Thought processes linear. Cognition and memory are normal.     ED Course     12:35 AM  The patient was seen and examined by Ceci Trammell MD, in Room 18.     ED Course     Procedures             EKG Interpretation:      Interpreted by Ceci Trammell MD  Time reviewed: 1:17  Symptoms at time of EKG: Abdominal pain, nausea, " "vomiting and dizziness  Rhythm: normal sinus   Rate: 82 bpm  Axis: normal  Ectopy: none  Conduction: normal  ST Segments/ T Waves: No ST-T wave changes  Q Waves: none  Comparison to prior: Unchanged from 10/19/17  Clinical Impression: normal EKG           Labs Ordered and Resulted from Time of ED Arrival Up to the Time of Departure from the ED   COMPREHENSIVE METABOLIC PANEL - Abnormal; Notable for the following:        Result Value    Glucose 152 (*)     Creatinine 0.57 (*)     AST 52 (*)     All other components within normal limits   CBC WITH PLATELETS DIFFERENTIAL   LIPASE   INR   BLOOD CULTURE   BLOOD CULTURE            Assessments & Plan (with Medical Decision Making)   IMPRESSION: 49-year-old male, somewhat medically complex with history of AIDS with known CNS toxoplasmosis, DM 2, history of constipation, GERD, with previous visits for abdominal pain, nausea/vomiting, small bowel obstruction, anxiety, et al. presents with worsening abdominal discomfort, somewhat consistent to pain he's had the past previously that he thinks may be related to particular foods, but with worsening \"heart burn\" sx's as well as described further above in the HPI/ROS. Clinically, patient appears nontoxic, but intermittently uncomfortable with a severe wave of pain that subsides. Abdominal exam shows pretty notable pain in the periumbilical and left abdomen. Otherwise no other obvious acute findings.      DDx includes, but not limited to, gastritis/PUD, pancreatitis, partial bowel obstruction, enteritis or colitis, diverticulitis, less likely renal in origin but considered pyelo or stone, et al. Does not sound consistent w/ an occult cardiac or pulmonary cause, as pain very much so abdominal on exam.      PLAN: Labs, urine studies, CT A/P, symptom management    RESULTS:  - Labs: AST 52, negative troponin, negative lipase, lactate 2.2  --- See ED Course section above for particular pertinent findings and comments  - Urine: No sample " "yet provided  - Imaging: Images and written preliminary reports reviewed by myself and revealed:  --- CT A/P: \"1. Circumferential thickening of the wall of the pylorus, possibly representing gastritis versus sequelae of peptic ulcer disease.  2. Prostatomegaly with circumferential bladder wall thickening suggestive of chronic outlet obstruction.   3. Single dilated loop of small bowel, nonspecific and partial obstruction is difficult to exclude.\" Per [facility], \"mild dilatation of fluid-filled small bowel loops in the right mid to lower abdomen concerning for segmental enteritis with reactive ileus.  Developing partial SBO is not excluded.  Remaining bowel loops normal in caliber.  The appendix is normal in caliber.\"  --- CXR: No acute cardiopulmonary findings    INTERVENTIONS:   - IVF  - GI cocktail  - IV Dilaudid    RE-EVALUATION:  See ED Course section above for particular pertinent findings and comments  - The patient's symptoms were not significantly changed even with the GI cocktail and IV Dilaudid    DISCUSSIONS:  - I discussed the care of the patient with Radiology, ED Obs provider, overnight EM MD   - w/ Patient: I have reviewed the available findings, plan  with the patient. He expressed understanding and agreement with this plan. All questions answered to the best of our ability at this time.     DISPOSITION/PLANNING:  - FINAL IMPRESSION: Abdominal pain, possible segmental enteritis, possible gastritis, possible early partial SBO versus ileus  - DISPOSITION: Admit to ED Obs for further evaluation/management  --- Recommendations: Serial abdominal exams, consider GI/ID consult, consider NGT if symptoms worsen, develops signficant vomiting, etc.      ______________________________________________________________________________    - I have reviewed the available nursing notes.    New Prescriptions    No medications on file       Final diagnoses:   None     Miky KELLY, am serving as a trained " medical scribe to document services personally performed by Ceci Trammell MD, based on the provider's statements to me.      I, Ceci Trammell MD, was physically present and have reviewed and verified the accuracy of this note documented by Miky Lima.    11/6/2017   [facility], [place], EMERGENCY DEPARTMENT     Ceci Trammell MD  11/07/17 1601

## 2017-11-07 NOTE — CONSULTS
GASTROENTEROLOGY CONSULTATION      Date of Admission:  11/6/2017  Date of Consult:                         11/7/2017       Chief Complaint:   We were asked by Lay Felix MD to evaluate this patient with abdominal pain, n/v and diarrhea.          ASSESSMENT AND RECOMMENDATIONS:   Assessment:  Andrew Lockwood is a 49 year old male with a history of HIV/AIDS, CNS toxoplasmosis, type II DM, GERD, chronic abdominal pain, and recurrent SBO who presents for evaluation of nausea, vomiting, and acute on chronic abdominal pain x2 days.    CBC, creatinine and lactate are normal. Abdominal/pelvic CT from 11/7/17 was reviewed, which showed circumferential pylorus wall thickening concerning for gastritis/PUD, and single dilated loop of small bowel concerning for partial bowel obstruction. Last salle bowel enteroscopy was 1/18/17 reactive gastropathy; and colonoscopy from 1/22/17 was normal. Drug screen was mostly negative, except opiates, which he is receiving currently.     Patient's symptoms might be r/t PUD secondary to NSAID use, versus infectious source that is causing him to have diarrhea. Plan is to rule out infection and we will further evaluate the dilated loop of small bowel for obstruction as patient reports vomiting undigested foods right after he eats.      Recommendations  --40mg of oral Protonix twice daily x8 weeks  --Await infectious work up (C. diff, enteric pathogens, giardia, ova/parasite and H. Pylori)  --Clear liquids  --Optimize electrolytes (potassium 4, calcium 8 and magnesium 2)  --Consider Hyosycamine and gabapentin for pain  --Upper GI series with small bowel follow through  --Avoid NSAIDs and narcotics    Gastroenterology follow up recommendations: Possible outpatient EGD to evaluate the circumferential pylorus wall thickening seen on CT.    Patient care plan discussed with Dr. Monte, GI staff physician. Thank you for involving us in this patient's care. Please do not hesitate to contact  the GI service with any questions or concerns.     Giuseppe Dubon CNP  Department of Gastroenterology   -------------------------------------------------------------------------------------------------------------------   History is obtained from the patient and the medical record.          History of Present Illness:   Andrew Lockwood is a 49 year old male with a history of HIV/AIDS, CNS toxoplasmosis, type II DM, GERD, chronic abdominal pain, and recurrent SBO who presents for evaluation of nausea, vomiting, and acute on chronic abdominal pain x2 days.    Patient reports having similar symptoms in every 2-3 for over a year now and no one seems to figure it out. However, current acute on chronic abdominal pain started 2 days ago with the nausea and vomiting. He also has runny stool that is brown. He believes raw vegetables and fruits might caused his current symptoms. He also think the mashed potatoes that he ate the night before might have been issue. He denies fever, chills, malaise, skin rash, odynophagia/dysphagia, hematochezia, melena, hematemesis, sick contacts, recent travel or new foods/meds. He takes Advil in every 2-3 weeks for headaches but has not had any in the past 2 weeks. No ETOH, tobacco or drug use.         Past Medical History:   Reviewed and edited as appropriate  Past Medical History:   Diagnosis Date     AIDS (H)      Allergic rhinitis due to other allergen     DNS     Chronic abdominal pain      CNS toxoplasmosis (H)      Diabetes type 2, controlled (H)      GERD (gastroesophageal reflux disease)      HIV (human immunodeficiency virus infection) (H)      Periungual wart      Sleep apnea     doesn't use CPAP            Past Surgical History:   Reviewed and edited as appropriate   Past Surgical History:   Procedure Laterality Date     C NONSPECIFIC PROCEDURE      right forearm fracture     COLONOSCOPY Left 1/22/2016    Procedure: COMBINED COLONOSCOPY, SINGLE OR MULTIPLE BIOPSY/POLYPECTOMY BY  BIOPSY;  Surgeon: Clark Saini MD;  Location: [facility]     HC EXPLORE UNDESC TESTIS,INGUIN/SCROTAL       LAPAROSCOPY DIAGNOSTIC (GENERAL) N/A 7/26/2016    Procedure: LAPAROSCOPY DIAGNOSTIC (GENERAL);  Surgeon: Susannah Arriaga MD;  Location: [facility] OR     OPTICAL TRACKING SYSTEM CRANIOTOMY, EXCISE TUMOR, COMBINED Left 4/10/2015    Procedure: COMBINED OPTICAL TRACKING SYSTEM CRANIOTOMY, EXCISE TUMOR;  Surgeon: Mirlande Colmenares MD;  Location: [facility] OR     REPAIR GAMEKEEPER'S THUMB Right 12/2/2016    Procedure: REPAIR LIGAMENT ULNAR COLLATERAL THUMB (GAMEKEEPER'S);  Surgeon: Evin Zamorano MD;  Location: [facility]            Previous Endoscopy:     Results for orders placed or performed during the hospital encounter of 01/08/16   COLONOSCOPY   Result Value Ref Range    COLONOSCOPY       [address] [place] [address] [phone]     Endoscopy Department  _______________________________________________________________________________  Patient Name: Andrew Lockwood             Procedure Date: 1/22/2016 10:08 AM  MRN: [id number]                       Account Number: [id number]  YOB: 1967             Admit Type: Inpatient  Age: 48                                Gender: Male  Note Status: Finalized                Attending MD: Clark Saini,   Pause for the Cause: pause for cause completed   _______________________________________________________________________________     Procedure:           Colonoscopy  Indications:         Chronic diarrhea  Providers:           Clark Saini, Harleen Garcia MD, MD,                        Anu Camp, SENG  Patient Profile:     49 yo Male with AIDS (CD4 24), CMV viremia, CNS                        toxoplasmosis, and ansal lesions with HSV+ stain with                         intermittent diarrhea and abdominal pain. Foundt to have                        partial SBO with thickening in the distal jejunum  which                        has resolved on interval imaging. Colonoscopy performed                        for further evaluation.  Referring MD:        Lam Silva MD  Requesting Provider: Lam Silva MD  Medicines:           Fentanyl 100 micrograms IV, Midazolam 3 mg IV  Complications:       No immediate complications.  _______________________________________________________________________________  Procedure:           Pre-Anesthesia Assessment:                       - Prior to the procedure, a History and Physical was                        performed, and patient medications and allergies were                        reviewed. The patient is competent. The risks and                        benefits of the procedure and the sedation options and                        risks were discussed with the patient. All quest ions                        were answered and informed consent was obtained. Patient                        identification and proposed procedure were verified by                        the physician in the pre-procedure area in the procedure                        room. Mental Status Examination: alert and oriented.                        Airway Examination: Mallampati Class II (the uvula but                        not tonsillar pillars visualized). Respiratory                        Examination: clear to auscultation. CV Examination:                        regular rate and rhythm. Prophylactic Antibiotics: The                        patient does not require prophylactic antibiotics. Prior                        Anticoagulants: The patient has taken no previous                        anticoagulant or antiplatelet agents. ASA Grade                        Assessment: III - A patient with severe systemic                        disease. After reviewing the risks and benefits, the                         patient was deemed in satisfactory condition to undergo                         the procedure. The anesthesia plan was to use moderate                        sedation / analgesia (conscious sedation). Immediately                        prior to administration of medications, the patient was                        re-assessed for adequacy to receive sedatives. The heart                        rate, respiratory rate, oxygen saturations, blood                        pressure, adequacy of pulmonary ventilation, and                        response to care were monitored throughout the                        procedure. The physical status of the patient was                        re-assessed after the procedure.                       After obtaining informed consent, the colonoscope was                        passed under direct vision. Throughout the procedure,                        the patient's blood pressure, pulse, and oxygen                        saturations were monito red continuously. The Colonoscope                        was introduced through the anus and advanced to 10 cm                        into the ileum. The colonoscopy was performed without                        difficulty. The patient tolerated the procedure well.                        The quality of the bowel preparation was evaluated using                        the BBPS (Paris Bowel Preparation Scale) with scores                        of: Right Colon = 1 (portion of mucosa seen, but other                        areas not well seen due to staining, residual stool                        and/or opaque liquid), Transverse Colon = 2 (minor                        amount of residual staining, small fragments of stool                        and/or opaque liquid, but mucosa seen well) and Left                        Colon = 2 (minor amount of residual staining, small                        fragments of stool and/or opaque liquid, but mucosa seen                        well). The total BBP S score equals 6.                                                                                    Findings:       The perianal and digital rectal examinations were normal.       The colon (entire examined portion) appeared normal. Biopsies were taken        with a cold forceps from the right colon and left colon for evaluation        of microscopic colitis.       The ileum, 10-15 cm from the ileocecal valve appeared normal.                                                                                   Impression:          - The entire examined colon is normal. Biopsied to                        evaluate for microscopic colitis.                       - The examined portion of the ileum was normal.                       - Based on this exam, no etiology of small bowel                        thickening was noted. We were unable to reach the area                        of concern. If further evaluation needed, would consider                        single balloon enteroscopy, versus ca psule endoscopy,                        versus repeat imaging to see if abnormality persists (as                        already improving on CT).  Recommendation:      - Return patient to [facility] for [facility] care.                       - Await pathology results.                                                                                     Electronically signed by: Clark Saini MD  _____________________  Clark Saini,   1/22/2016 11:44 AM    Electronically signed by: Clark Saini MD  _____________________  Clark Saini,   1/22/2016 11:44 AM  Number of Addenda: 0    Note Initiated On: 1/22/2016 10:08 AM              Social History:   Reviewed and edited as appropriate  Social History     Social History     Marital status: Single     Spouse name: N/A     Number of children: N/A     Years of education: N/A     Occupational History           5 years; [employer]     Social History Main Topics     Smoking status: Current Every Day  "Smoker     Packs/day: 0.20     Last attempt to quit: 10/28/2016     Smokeless tobacco: Former User      Comment: 4 cigarettes     Alcohol use No      Comment: Last etoh in 2007     Drug use: Yes     Special: Marijuana      Comment: \"Not very often\"     Sexual activity: Not Currently     Partners: Female, Male      Comment: Last sexual activity 2008     Other Topics Concern     Not on file     Social History Narrative    Born in [facility].  Came to the [place] in 1993.  Last traveled to visit family in 2008.                 Family History:   Reviewed and edited as appropriate  Family History   Problem Relation Age of Onset     DIABETES Brother      DIABETES Father      Alzheimer Disease Father      Unknown/Adopted Mother      DIABETES Paternal Grandfather      CANCER No family hx of      no skin cancer     Skin Cancer No family hx of      no famiy hx of skin cancer           Allergies:   Reviewed and edited as appropriate     Allergies   Allergen Reactions     Milk [Lac Bovis] Hives     Tylenol [Acetaminophen] Itching            Medications:     Prescriptions Prior to Admission   Medication Sig Dispense Refill Last Dose     GENVOYA [phone] mg tablet Take 1 tablet by mouth daily 30 tablet 5 11/7/2017 at AM     metFORMIN (GLUCOPHAGE) 500 MG tablet TAKE TWO TABLETS BY MOUTH TWICE A DAY WITH MEALS 120 tablet 3 11/6/2017 at Unknown time     omeprazole (PRILOSEC) 20 MG CR capsule TAKE ONE CAPSULE BY MOUTH EVERY DAY 60 capsule 3 11/6/2017 at AM     azithromycin (ZITHROMAX) 600 MG tablet TAKE 2 TABLETS (1200 MG) BY MOUTH ONCE EVERY 7 DAYS (Patient taking differently: TAKE 1 TABLET (600 mg) BY MOUTH ONCE EVERY 7 DAYS) 8 tablet 11 11/5/2017     sulfamethoxazole-trimethoprim (BACTRIM DS/SEPTRA DS) 800-160 MG per tablet TAKE 1 TABLET BY MOUTH 2 TIMES DAILY (Patient taking differently: 4 tablets 2 times daily TAKE 1 TABLET BY MOUTH 2 TIMES DAILY) 60 tablet 5 11/7/2017 at 0900     gabapentin (NEURONTIN) 300 MG capsule " "Take 1 capsule (300 mg) by mouth 3 times daily 90 capsule 5 11/7/2017 at AM     blood glucose monitoring (NO BRAND SPECIFIED) meter device kit Use to test blood sugar four times daily or as directed. 1 kit 0 Taking     blood glucose monitoring (NO BRAND SPECIFIED) test strip Use to test blood sugars four times daily or as directed 100 each 5 Taking     blood glucose (NO BRAND SPECIFIED) lancets standard Use to test blood sugar four times daily or as directed. 100 Box 5 Taking             Review of Systems:   A complete review of systems was performed and is negative except as noted in the HPI           Physical Exam:   /73 (BP Location: Left arm)  Pulse 86  Temp 98.8  F (37.1  C) (Oral)  Resp 16  Ht 1.626 m (5' 4\")  Wt 77.1 kg (170 lb)  SpO2 98%  BMI 29.18 kg/m2  Wt:   Wt Readings from Last 2 Encounters:   11/06/17 77.1 kg (170 lb)   09/26/17 78.7 kg (173 lb 9.6 oz)      Constitutional: cooperative, pleasant, not dyspneic/diaphoretic, no acute distress  Eyes: Sclera anicteric/injected  Ears/nose/mouth/throat: Normal oropharynx without ulcers or exudate, mucus membranes moist, hearing intact  Neck: supple, thyroid normal size  CV: RRR. No edema in LE bilaterally   Respiratory: Unlabored breathing. CTA bilaterally   Lymph: No axillary, submandibular, supraclavicular or inguinal lymphadenopathy  Abd: Nondistended, +bs, no hepatosplenomegaly, no peritoneal signs. Tender in epigastric region, LUQ and LLQ.   Skin: warm, perfused, no jaundice  Neuro: AAO x 3, No asterixis  Psych: Normal affect  MSK: Normal gait         Data:   Labs and imaging below were independently reviewed and interpreted    BMP  Recent Labs  Lab 11/07/17  0726 11/06/17  2346    137   POTASSIUM 3.2* 3.4   CHLORIDE 104 102   LINDA 8.8 9.3   CO2 26 26   BUN 12 12   CR 0.56* 0.57*   * 152*     CBC  Recent Labs  Lab 11/07/17  0726 11/06/17  2346   WBC 8.3 8.3   RBC 4.42 4.87   HGB 14.1 15.9   HCT 40.7 44.7   MCV 92 92   MCH 31.9 " 32.6   MCHC 34.6 35.6   RDW 13.1 13.1    182     INR  Recent Labs  Lab 11/06/17  2346   INR 0.91     LFTs  Recent Labs  Lab 11/06/17  2346   ALKPHOS 146   AST 52*   ALT 51   BILITOTAL 0.5   PROTTOTAL 8.2   ALBUMIN 3.8      PANC  Recent Labs  Lab 11/06/17  2346   LIPASE 196

## 2017-11-07 NOTE — ED NOTES
Pt states abd pain started this morning and has increased thur out the day, also has had nausea, vomiting today and hasn't been able to eat or drink today, states he's now feeling a little dizzy.

## 2017-11-07 NOTE — PLAN OF CARE
Problem: Patient Care Overview  Goal: Plan of Care/Patient Progress Review  Outcome: No Change  Vital signs:  Temp: 98.8  F (37.1  C) Temp src: Oral BP: 107/73 Pulse: 86   Resp: 16 SpO2: 98 % O2 Device: None (Room air)        Pt arrived to unit around 0500. Here for observation d/t n/v/abdominal pain. PIV in right infusing NS @ 125 mL/hr. Pt c/o pain to LUQ and heartburn. Dilaudid given x1. Maalax given x1. Pt reports 1 watery BM around 0700. [facility] sent.

## 2017-11-08 ENCOUNTER — CARE COORDINATION (OUTPATIENT)
Dept: CARE COORDINATION | Facility: CLINIC | Age: 52
End: 2017-11-08

## 2017-11-08 LAB
G LAMBLIA AG STL QL IA: NORMAL
H PYLORI AG STL QL IA: NORMAL
INTERPRETATION ECG - MUSE: NORMAL
O+P STL MICRO: ABNORMAL
O+P STL MICRO: ABNORMAL
SPECIMEN SOURCE: ABNORMAL
SPECIMEN SOURCE: NORMAL
SPECIMEN SOURCE: NORMAL

## 2017-11-08 NOTE — PROGRESS NOTES
The Patient left AMA so there is no post Discharge Follow Up required at this time            Patient signed AMA sheet with nursing.

## 2017-11-09 ENCOUNTER — TELEPHONE (OUTPATIENT)
Dept: GASTROENTEROLOGY | Facility: CLINIC | Age: 52
End: 2017-11-09

## 2017-11-09 ENCOUNTER — TELEPHONE (OUTPATIENT)
Dept: EMERGENCY MEDICINE | Facility: CLINIC | Age: 52
End: 2017-11-09

## 2017-11-10 ENCOUNTER — TELEPHONE (OUTPATIENT)
Dept: GASTROENTEROLOGY | Facility: CLINIC | Age: 52
End: 2017-11-10

## 2017-11-11 LAB
BACTERIA SPEC CULT: ABNORMAL
SPECIMEN SOURCE: ABNORMAL

## 2017-11-13 LAB
BACTERIA SPEC CULT: NO GROWTH
SPECIMEN SOURCE: NORMAL

## 2017-12-11 ENCOUNTER — HOSPITAL ENCOUNTER (EMERGENCY)
Facility: CLINIC | Age: 52
Discharge: HOME | End: 2017-12-11
Attending: EMERGENCY MEDICINE | Admitting: EMERGENCY MEDICINE
Payer: COMMERCIAL

## 2017-12-11 ENCOUNTER — APPOINTMENT (OUTPATIENT)
Dept: GENERAL RADIOLOGY | Facility: CLINIC | Age: 52
End: 2017-12-11
Attending: EMERGENCY MEDICINE
Payer: COMMERCIAL

## 2017-12-11 VITALS
TEMPERATURE: 98.7 F | BODY MASS INDEX: 28.32 KG/M2 | DIASTOLIC BLOOD PRESSURE: 86 MMHG | SYSTOLIC BLOOD PRESSURE: 152 MMHG | HEIGHT: 65 IN | WEIGHT: 170 LBS | HEART RATE: 100 BPM | RESPIRATION RATE: 18 BRPM | OXYGEN SATURATION: 97 %

## 2017-12-11 DIAGNOSIS — J06.9 VIRAL URI WITH COUGH: ICD-10-CM

## 2017-12-11 PROCEDURE — 99283 EMERGENCY DEPT VISIT LOW MDM: CPT | Performed by: EMERGENCY MEDICINE

## 2017-12-11 PROCEDURE — 99284 EMERGENCY DEPT VISIT MOD MDM: CPT | Mod: Z6 | Performed by: EMERGENCY MEDICINE

## 2017-12-11 PROCEDURE — 71020 XR CHEST 2 VW: CPT

## 2017-12-11 RX ORDER — BENZONATATE 100 MG/1
100 CAPSULE ORAL 3 TIMES DAILY PRN
Qty: 30 CAPSULE | Refills: 0 | Status: SHIPPED | OUTPATIENT
Start: 2017-12-11 | End: 2018-01-24

## 2017-12-11 RX ORDER — PSEUDOEPHEDRINE HCL 30 MG
60 TABLET ORAL EVERY 6 HOURS PRN
Qty: 112 TABLET | Refills: 0 | Status: SHIPPED | OUTPATIENT
Start: 2017-12-11 | End: 2018-01-31

## 2017-12-11 ASSESSMENT — ENCOUNTER SYMPTOMS
SHORTNESS OF BREATH: 0
GASTROINTESTINAL NEGATIVE: 1
MUSCULOSKELETAL NEGATIVE: 1
COUGH: 1
FEVER: 0

## 2017-12-11 NOTE — ED AVS SNAPSHOT
[facility], Emergency Department    [address] [address]    Phone:  [phone]                                       Andrew Lockwood   MRN: [id number]    Department:  [facility], Emergency Department   Date of Visit:  12/11/2017           Patient Information     Date Of Birth          11/27/1967        Your diagnoses for this visit were:     Viral URI with cough        You were seen by Thanh Lehman MD.        Discharge Instructions         Viral Upper Respiratory Illness (Adult)  You have a viral upper respiratory illness (URI), which is another term for the common cold. This illness is contagious during the first few days. It is spread through the air by coughing and sneezing. It may also be spread by direct contact (touching the sick person and then touching your own eyes, nose, or mouth). Frequent handwashing will decrease risk of spread. Most viral illnesses go away within 7 to 10 days with rest and simple home remedies. Sometimes the illness may last for several weeks. Antibiotics will not kill a virus, and they are generally not prescribed for this condition.    Home care    If symptoms are severe, rest at home for the first 2 to 3 days. When you resume activity, don't let yourself get too tired.    Avoid being exposed to cigarette smoke (yours or others ).    You may use acetaminophen or ibuprofen to control pain and fever, unless another medicine was prescribed. (Note: If you have chronic liver or kidney disease, have ever had a stomach ulcer or gastrointestinal bleeding, or are taking blood-thinning medicines, talk with your healthcare provider before using these medicines.) Aspirin should never be given to anyone under 18 years of age who is ill with a viral infection or fever. It may cause severe liver or brain damage.    Your appetite may be poor, so a light diet is fine. Avoid dehydration by drinking 6 to 8 glasses of fluids per day (water, soft drinks, juices, tea,  or soup). Extra fluids will help loosen secretions in the nose and lungs.    Over-the-counter cold medicines will not shorten the length of time you re sick, but they may be helpful for the following symptoms: cough, sore throat, and nasal and sinus congestion. (Note: Do not use decongestants if you have high blood pressure.)  Follow-up care  Follow up with your healthcare provider, or as advised.  When to seek medical advice  Call your healthcare provider right away if any of these occur:    Cough with lots of colored sputum (mucus)    Severe headache; face, neck, or ear pain    Difficulty swallowing due to throat pain    Fever of 100.4 F (38 C)  Call 911, or get immediate medical care  Call emergency services right away if any of these occur:    Chest pain, shortness of breath, wheezing, or difficulty breathing    Coughing up blood    Inability to swallow due to throat pain  Date Last Reviewed: 9/13/2015 2000-2017 [employer]. [address] [place] [address]. All rights reserved. This information is not intended as a substitute for professional medical care. Always follow your healthcare professional's instructions.          24 Hour Appointment Hotline       To make an appointment at any [facility] clinic, call [phone] ([phone]). If you don't have a family doctor or clinic, we will help you find one. [facility] clinics are conveniently located to serve the needs of you and your family.             Review of your medicines      START taking        Dose / Directions Last dose taken    benzonatate 100 MG capsule   Commonly known as:  TESSALON   Dose:  100 mg   Quantity:  30 capsule        Take 1 capsule (100 mg) by mouth 3 times daily as needed for cough   Refills:  0        pseudoePHEDrine 30 MG tablet   Commonly known as:  SUDAFED   Dose:  60 mg   Quantity:  112 tablet        Take 2 tablets (60 mg) by mouth every 6 hours as needed for congestion   Refills:  0          Our  records show that you are taking the medicines listed below. If these are incorrect, please call your family doctor or clinic.        Dose / Directions Last dose taken    azithromycin 600 MG tablet   Commonly known as:  ZITHROMAX   Quantity:  8 tablet        TAKE 2 TABLETS (1200 MG) BY MOUTH ONCE EVERY 7 DAYS   Refills:  11        blood glucose lancets standard   Commonly known as:  no brand specified   Quantity:  100 Box        Use to test blood sugar four times daily or as directed.   Refills:  5        blood glucose monitoring meter device kit   Commonly known as:  no brand specified   Quantity:  1 kit        Use to test blood sugar four times daily or as directed.   Refills:  0        blood glucose monitoring test strip   Commonly known as:  no brand specified   Quantity:  100 each        Use to test blood sugars four times daily or as directed   Refills:  5        Fmjkujd-Brmwb-Rfwucbtl-TenofAF [id number] MG Tabs per tablet   Commonly known as:  GENVOYA   Dose:  1 tablet   Quantity:  30 tablet        Take 1 tablet by mouth daily   Refills:  5        gabapentin 300 MG capsule   Commonly known as:  NEURONTIN   Dose:  300 mg   Quantity:  90 capsule        Take 1 capsule (300 mg) by mouth 3 times daily   Refills:  5        metFORMIN 500 MG tablet   Commonly known as:  GLUCOPHAGE   Quantity:  120 tablet        TAKE TWO TABLETS BY MOUTH TWICE A DAY WITH MEALS   Refills:  3        omeprazole 20 MG CR capsule   Commonly known as:  priLOSEC   Quantity:  60 capsule        TAKE ONE CAPSULE BY MOUTH EVERY DAY   Refills:  3        sulfamethoxazole-trimethoprim 800-160 MG per tablet   Commonly known as:  BACTRIM DS/SEPTRA DS   Quantity:  60 tablet        TAKE 1 TABLET BY MOUTH 2 TIMES DAILY   Refills:  5                Prescriptions were sent or printed at these locations (2 Prescriptions)                   Other Prescriptions                Printed at Department/Unit printer (2 of 2)         benzonatate (TESSALON) 100  MG capsule               pseudoePHEDrine (SUDAFED) 30 MG tablet                Procedures and tests performed during your visit     XR Chest 2 Views      Orders Needing Specimen Collection     None      Pending Results     Date and Time Order Name Status Description    12/11/2017 1700 XR Chest 2 Views Preliminary             Pending Culture Results     No orders found from 12/9/2017 to 12/12/2017.            Pending Results Instructions     If you had any lab results that were not finalized at the time of your Discharge, you can call the ED Lab Result RN at [phone]. You will be contacted by this team for any positive Lab results or changes in treatment. The nurses are available 7 days a week from 10A to 6:30P.  You can leave a message 24 hours per day and they will return your call.        Thank you for choosing [facility]       Thank you for choosing [facility] for your care. Our goal is always to provide you with excellent care. Hearing back from our patients is one way we can continue to improve our services. Please take a few minutes to complete the written survey that you may receive in the mail after you visit with us. Thank you!        [employer] Information     [employer] gives you secure access to your electronic health record. If you see a primary care provider, you can also send messages to your care team and make appointments. If you have questions, please call your primary care clinic.  If you do not have a primary care provider, please call [phone] and they will assist you.        Care EveryWhere ID     This is your Care EveryWhere ID. This could be used by other organizations to access your [facility] medical records  [id number]        Equal Access to Services     RA JORDAN AH: Hadii carissa Cody, maverick centeno, dez jackson. So [facility] [phone].    ATENCIÓN: Si habla español, tiene a kohli disposición servicios gratuitos de  asistencia lingüística. Gurdeep al [phone].    We comply with applicable federal civil rights laws and [place] laws. We do not discriminate on the basis of race, color, national origin, age, disability, sex, sexual orientation, or gender identity.            After Visit Summary       This is your record. Keep this with you and show to your community pharmacist(s) and doctor(s) at your next visit.

## 2017-12-11 NOTE — ED AVS SNAPSHOT
[facility], [place], Emergency Department    [address] [place] [id number]    Phone:  [phone]                                       Andrew Lockwood   MRN: [id number]    Department:  [facility], Emergency Department   Date of Visit:  12/11/2017           After Visit Summary Signature Page     I have received my discharge instructions, and my questions have been answered. I have discussed any challenges I see with this plan with the nurse or doctor.    ..........................................................................................................................................  Patient/Patient Representative Signature      ..........................................................................................................................................  Patient Representative Print Name and Relationship to Patient    ..................................................               ................................................  Date                                            Time    ..........................................................................................................................................  Reviewed by Signature/Title    ...................................................              ..............................................  Date                                                            Time

## 2017-12-11 NOTE — ED PROVIDER NOTES
History     Chief Complaint   Patient presents with     Cough     HPI  Andrew Lockwood is a 50 year old male who went to the clinic for cough and upper respiratory symptoms but was unable to be seen so came to the Emergency Department.  He has a history of HIV, he has not had fevers.  He has classic viral symptoms.  Review of his most recent labs show normal white count.  Here he is not febrile and not hypoxic.      This part of the document was transcribed by Sharmaine Martini Scribrobert.   I have reviewed the Medications, Allergies, Past Medical and Surgical History, and Social History in the [employer] system.  Past Medical History:   Diagnosis Date     AIDS (H)      Allergic rhinitis due to other allergen     DNS     Chronic abdominal pain      CNS toxoplasmosis (H)      Diabetes type 2, controlled (H)      GERD (gastroesophageal reflux disease)      HIV (human immunodeficiency virus infection) (H)      Periungual wart      Sleep apnea     doesn't use CPAP       Past Surgical History:   Procedure Laterality Date     C NONSPECIFIC PROCEDURE      right forearm fracture     COLONOSCOPY Left 1/22/2016    Procedure: COMBINED COLONOSCOPY, SINGLE OR MULTIPLE BIOPSY/POLYPECTOMY BY BIOPSY;  Surgeon: Clark Saini MD;  Location: [facility]     HC EXPLORE UNDESC TESTIS,INGUIN/SCROTAL       LAPAROSCOPY DIAGNOSTIC (GENERAL) N/A 7/26/2016    Procedure: LAPAROSCOPY DIAGNOSTIC (GENERAL);  Surgeon: Susannah Arriaga MD;  Location: [facility]     OPTICAL TRACKING SYSTEM CRANIOTOMY, EXCISE TUMOR, COMBINED Left 4/10/2015    Procedure: COMBINED OPTICAL TRACKING SYSTEM CRANIOTOMY, EXCISE TUMOR;  Surgeon: Mirlande Colmenares MD;  [facility]     REPAIR GAMEKEEPER'S THUMB Right 12/2/2016    Procedure: REPAIR LIGAMENT ULNAR COLLATERAL THUMB (GAMEKEEPER'S);  Surgeon: Evin Zamorano MD;  Location:  OR       Family History   Problem Relation Age of Onset     DIABETES Brother      DIABETES Father      Alzheimer Disease Father   "    Unknown/Adopted Mother      DIABETES Paternal Grandfather      CANCER No family hx of      no skin cancer     Skin Cancer No family hx of      no famiy hx of skin cancer       Social History   Substance Use Topics     Smoking status: Current Every Day Smoker     Packs/day: 0.20     Last attempt to quit: 10/28/2016     Smokeless tobacco: Former User      Comment: 4 cigarettes     Alcohol use No      Comment: Last etoh in 2007       No current facility-administered medications for this encounter.      Current Outpatient Prescriptions   Medication     benzonatate (TESSALON) 100 MG capsule     pseudoePHEDrine (SUDAFED) 30 MG tablet     metFORMIN (GLUCOPHAGE) 500 MG tablet     azithromycin (ZITHROMAX) 600 MG tablet     sulfamethoxazole-trimethoprim (BACTRIM DS/SEPTRA DS) 800-160 MG per tablet     gabapentin (NEURONTIN) 300 MG capsule     GENVOYA [phone] mg tablet     omeprazole (PRILOSEC) 20 MG CR capsule     blood glucose monitoring (NO BRAND SPECIFIED) meter device kit     blood glucose monitoring (NO BRAND SPECIFIED) test strip     blood glucose (NO BRAND SPECIFIED) lancets standard        Allergies   Allergen Reactions     Milk [Lac Bovis] Hives     Tylenol [Acetaminophen] Itching       Review of Systems   Constitutional: Negative for fever.   HENT: Positive for congestion and postnasal drip.    Respiratory: Positive for cough. Negative for shortness of breath.    Gastrointestinal: Negative.    Musculoskeletal: Negative.    All other systems reviewed and are negative.      Physical Exam   BP: 148/86  Pulse: 108  Temp: 98.7  F (37.1  C)  Resp: 18  Height: 165.1 cm (5' 5\")  Weight: 77.1 kg (170 lb)  SpO2: 98 %      Physical Exam   Constitutional: He is oriented to person, place, and time. He appears well-developed and well-nourished. No distress.   HENT:   Head: Normocephalic and atraumatic.   Eyes: Conjunctivae are normal. Pupils are equal, round, and reactive to light.   Neck: Neck supple. "   Cardiovascular: Normal rate, regular rhythm and normal heart sounds.    No murmur heard.  Pulmonary/Chest: Effort normal and breath sounds normal. No respiratory distress. He has no wheezes.   Neurological: He is alert and oriented to person, place, and time.   Skin: Skin is warm and dry.   Psychiatric: He has a normal mood and affect. His behavior is normal.   Nursing note and vitals reviewed.      ED Course     ED Course     Procedures        Results for orders placed or performed during the hospital encounter of 12/11/17   XR Chest 2 Views    Impression    Impression:   No acute findings in the chest.            Labs Ordered and Resulted from Time of ED Arrival Up to the Time of Departure from the ED - No data to display         Assessments & Plan (with Medical Decision Making)   50-year-old male presenting with URI symptoms and a cough with normal chest x-ray.  We will discharge him on Sudafed for his congestion and Tessalon Perles for his cough.  Follow-up in your HIV clinic they will see you on a walk-in basis at any time.    I have reviewed the nursing notes.    I have reviewed the findings, diagnosis, plan and need for follow up with the patient.  This part of the document was transcribed by Natalie Partida, Medical Scribe.   New Prescriptions    BENZONATATE (TESSALON) 100 MG CAPSULE    Take 1 capsule (100 mg) by mouth 3 times daily as needed for cough    PSEUDOEPHEDRINE (SUDAFED) 30 MG TABLET    Take 2 tablets (60 mg) by mouth every 6 hours as needed for congestion       Final diagnoses:   Viral URI with cough       12/11/2017   [facility], [place], EMERGENCY DEPARTMENT     Thanh Lehman MD  12/11/17 [id number]

## 2017-12-11 NOTE — DISCHARGE INSTRUCTIONS

## 2017-12-11 NOTE — ED NOTES
"Triage Assessment & Note:    /86  Pulse 108  Temp 98.7  F (37.1  C) (Oral)  Resp 18  Ht 1.651 m (5' 5\")  Wt 77.1 kg (170 lb)  SpO2 98%  BMI 28.29 kg/m2    Patient presents with: c/o cough x 2 weeks/ productive and head pressure and chest pressure that increases with cough.     Home Treatments/Remedies: None    Febrile / Afebrile? Afebrile    Duration of C/o: 1 week    Mick Barrientos  December 11, 2017      "

## 2017-12-12 DIAGNOSIS — Z21 HUMAN IMMUNODEFICIENCY VIRUS I INFECTION (H): ICD-10-CM

## 2017-12-12 DIAGNOSIS — Z21 HUMAN IMMUNODEFICIENCY VIRUS I INFECTION (H): Primary | ICD-10-CM

## 2017-12-12 LAB
ALBUMIN SERPL-MCNC: 3.7 G/DL (ref 3.4–5)
ALP SERPL-CCNC: 103 U/L (ref 40–150)
ALT SERPL W P-5'-P-CCNC: 30 U/L (ref 0–70)
ANION GAP SERPL CALCULATED.3IONS-SCNC: 9 MMOL/L (ref 3–14)
AST SERPL W P-5'-P-CCNC: 24 U/L (ref 0–45)
BASOPHILS # BLD AUTO: 0 10E9/L (ref 0–0.2)
BASOPHILS NFR BLD AUTO: 0.4 %
BILIRUB SERPL-MCNC: 0.6 MG/DL (ref 0.2–1.3)
BUN SERPL-MCNC: 14 MG/DL (ref 7–30)
CALCIUM SERPL-MCNC: 8.6 MG/DL (ref 8.5–10.1)
CHLORIDE SERPL-SCNC: 103 MMOL/L (ref 94–109)
CO2 SERPL-SCNC: 23 MMOL/L (ref 20–32)
CREAT SERPL-MCNC: 0.88 MG/DL (ref 0.66–1.25)
DIFFERENTIAL METHOD BLD: ABNORMAL
EOSINOPHIL # BLD AUTO: 0.3 10E9/L (ref 0–0.7)
EOSINOPHIL NFR BLD AUTO: 3.6 %
ERYTHROCYTE [DISTWIDTH] IN BLOOD BY AUTOMATED COUNT: 13.3 % (ref 10–15)
GFR SERPL CREATININE-BSD FRML MDRD: >90 ML/MIN/1.7M2
GLUCOSE SERPL-MCNC: 163 MG/DL (ref 70–99)
HCT VFR BLD AUTO: 40.4 % (ref 40–53)
HGB BLD-MCNC: 13.7 G/DL (ref 13.3–17.7)
IMM GRANULOCYTES # BLD: 0.1 10E9/L (ref 0–0.4)
IMM GRANULOCYTES NFR BLD: 0.8 %
LYMPHOCYTES # BLD AUTO: 2.4 10E9/L (ref 0.8–5.3)
LYMPHOCYTES NFR BLD AUTO: 33.3 %
MCH RBC QN AUTO: 31.9 PG (ref 26.5–33)
MCHC RBC AUTO-ENTMCNC: 33.9 G/DL (ref 31.5–36.5)
MCV RBC AUTO: 94 FL (ref 78–100)
MONOCYTES # BLD AUTO: 0.6 10E9/L (ref 0–1.3)
MONOCYTES NFR BLD AUTO: 7.8 %
NEUTROPHILS # BLD AUTO: 3.9 10E9/L (ref 1.6–8.3)
NEUTROPHILS NFR BLD AUTO: 54.1 %
NRBC # BLD AUTO: 0 10*3/UL
NRBC BLD AUTO-RTO: 0 /100
PLATELET # BLD AUTO: 222 10E9/L (ref 150–450)
POTASSIUM SERPL-SCNC: 3.4 MMOL/L (ref 3.4–5.3)
PROT SERPL-MCNC: 7.4 G/DL (ref 6.8–8.8)
RBC # BLD AUTO: 4.3 10E12/L (ref 4.4–5.9)
SODIUM SERPL-SCNC: 135 MMOL/L (ref 133–144)
WBC # BLD AUTO: 7.2 10E9/L (ref 4–11)

## 2017-12-12 NOTE — NURSING NOTE
Per [facility] Ambulatory Care Protocol, Pt is due for routine labs based on disease state or monitoring of medications. Lab orders entered.  Dalila Wilson RN

## 2017-12-13 LAB
CD3 CELLS # BLD: 1079 CELLS/UL (ref 603–2990)
CD3 CELLS NFR BLD: 47 % (ref 49–84)
CD3+CD4+ CELLS # BLD: 225 CELLS/UL (ref 441–2156)
CD3+CD4+ CELLS NFR BLD: 10 % (ref 28–63)
CD3+CD4+ CELLS/CD3+CD8+ CLL BLD: 0.29 % (ref 1.4–2.6)
CD3+CD8+ CELLS # BLD: 793 CELLS/UL (ref 125–1312)
CD3+CD8+ CELLS NFR BLD: 34 % (ref 10–40)
IFC SPECIMEN: ABNORMAL

## 2017-12-14 ENCOUNTER — OFFICE VISIT (OUTPATIENT)
Dept: INFECTIOUS DISEASES | Facility: CLINIC | Age: 52
End: 2017-12-14
Attending: INTERNAL MEDICINE
Payer: COMMERCIAL

## 2017-12-14 VITALS
TEMPERATURE: 97.7 F | DIASTOLIC BLOOD PRESSURE: 88 MMHG | BODY MASS INDEX: 28.03 KG/M2 | HEART RATE: 101 BPM | HEIGHT: 64 IN | WEIGHT: 164.2 LBS | SYSTOLIC BLOOD PRESSURE: 153 MMHG

## 2017-12-14 DIAGNOSIS — E11.9 TYPE 2 DIABETES MELLITUS WITHOUT COMPLICATION, WITHOUT LONG-TERM CURRENT USE OF INSULIN (H): ICD-10-CM

## 2017-12-14 DIAGNOSIS — F64.0 GENDER DYSPHORIA IN ADULT: ICD-10-CM

## 2017-12-14 DIAGNOSIS — Z21 HUMAN IMMUNODEFICIENCY VIRUS I INFECTION (H): Primary | ICD-10-CM

## 2017-12-14 DIAGNOSIS — Z00.00 HEALTH CARE MAINTENANCE: Primary | ICD-10-CM

## 2017-12-14 DIAGNOSIS — Z00.00 HEALTH CARE MAINTENANCE: ICD-10-CM

## 2017-12-14 PROCEDURE — 25000128 H RX IP 250 OP 636: Mod: ZF | Performed by: INTERNAL MEDICINE

## 2017-12-14 PROCEDURE — 99213 OFFICE O/P EST LOW 20 MIN: CPT | Mod: 25,ZF

## 2017-12-14 PROCEDURE — 90686 IIV4 VACC NO PRSV 0.5 ML IM: CPT | Mod: ZF | Performed by: INTERNAL MEDICINE

## 2017-12-14 PROCEDURE — G0008 ADMIN INFLUENZA VIRUS VAC: HCPCS | Mod: ZF

## 2017-12-14 PROCEDURE — 99213 OFFICE O/P EST LOW 20 MIN: CPT

## 2017-12-14 RX ADMIN — INFLUENZA A VIRUS A/MICHIGAN/45/2015 X-275 (H1N1) ANTIGEN (FORMALDEHYDE INACTIVATED), INFLUENZA A VIRUS A/HONG KONG/4801/2014 X-263B (H3N2) ANTIGEN (FORMALDEHYDE INACTIVATED), INFLUENZA B VIRUS B/PHUKET/3073/2013 ANTIGEN (FORMALDEHYDE INACTIVATED), AND INFLUENZA B VIRUS B/BRISBANE/60/2008 ANTIGEN (FORMALDEHYDE INACTIVATED) 0.5 ML: 15; 15; 15; 15 INJECTION, SUSPENSION INTRAMUSCULAR at 14:26

## 2017-12-14 ASSESSMENT — PAIN SCALES - GENERAL: PAINLEVEL: NO PAIN (0)

## 2017-12-14 NOTE — LETTER
12/14/2017      RE: Andrew Lockwood  [address]   [facility] [address]       [facility] - HIV Progress Note  Dr. Sharon Rosado, [facility], [facility], [address] [place] [address], Sixth Floor, [address]  [place] [address]  Patient:  Andrew Lockwood, Date of birth 11/27/1967, Medical record number [id number]  Date of Visit: Dec 14, 2017         Assessment and Recommendations:     HIV -   Continue on Genvoya. He has had a recent interruption in therapy because of incarceration. Continue Bactrim prophylaxis as well.  Today, we discussed in some detail what he should do if he ends up being deported.  For the most part, from a resistance standpoint (Andrew does not have any documented resistance).  He can take about any therapy.  However, Andrew experienced significant side effects on the other regim    DMII   I counseled Mr. Lockwood on this, reviewing glucose levels and Hemoglobin A1C levels.  I encouraged him to follow up his PCP for this.     Symptoms of Gender Dysphoria  Andrew is interested in hormonally and surgically transitioning to becoming a woman.  I have had a long healthcare relationship with Andrew and this is the first time that he is mentioning these feelings.  In the past he has wanted to have testosterone therapy.  He reports that this is because of his Hinduism.  However, with the major life stress that Andrew is currently under and his history of structural brain disease which we have had concerns about in the past with reference to his healthcare decision capacity, I would prefer that he undergo a more comprehensive evaluation for this at the [facility] with [facility].  I discussed this with Simon Cadena to help Andrew arrange this.     Impending Homelessness  Mr. Lockwood is about to lose his housing, although he does not seem to be too concerned about this  at present.  He went through a difficult patch with this last year that was very difficult for him.  Dalila Montes and I discussed options for him in depth today.     Preventative health care  Cardiovascular Juan Jose -               Lipids - CHOL 180, LDL 72 (8/18/2016)              Blood Pressure -  /88 today.  Will continue to monitor, again, he needs follow up with his PCP.   Cancer Screening              Colon - Up to date. [facility]   Anal - Previous anal pap with insufficient cells.  He has been referred to colorectal, but he has not yet made an appointment  Immunizations              Hepatitis A - immune, serology 4/13/15              Hepatitis B - 6/16/2016, 3/10/2016, 8/31/2017              Influenza - Up To date              Pneumovax/Prevnar - Up to date               Tdap - 6/8/14 per [facility]              Menactra - Up to date.    Renal Health -  UA without proteinuria and creatinine normal in 12/2016.    Sexually Transmitted Infection Risk and Screening              Gonorrhea and Chlamydia - GC/chlamydia negative on 3/2017              Syphilis - negative 3/2017    I spent more than 40 minutes today in face to fact time managing the care of Andrew Lockwood.  Over 50% of my time on the unit was spent counseling the patient and/or coordinating care regarding services listed in this note.    Sharon Rosado MD  Division of Infectious Diseases and International Medicine  [phone]         History of Infectious Disease Illness:   Mr. Lockwood is a 50 year old man who presents with routine follow up.  We have had difficulty getting Andrew in because he has had several weeks of incarceration. It is not clear what precipitated this, but it sounds like it was immigration status related.  We have regularly been in touch with his  on this issue.  He is also about to be evicted from his home although it is not clear if [facility] will be able to make him leave in the winter.  Andrew has been working on  getting money from someone in [place] or someone from his [employer].  Today he reports that he is interesting in transitioning to a woman and he would like both hormonal therapy and gender reassignment surgery.  He reports that he thinks that he no longer has DM.         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 They did not live together.  Historical use of ARVs: Triumeq, Genvoya  Historical Resistance Mutations: Susceptible        Past Medical and Surgical History:     Past Medical History:   Diagnosis Date     AIDS (H)      Allergic rhinitis due to other allergen     DNS     Chronic abdominal pain      CNS toxoplasmosis (H)      Diabetes type 2, controlled (H)      GERD (gastroesophageal reflux disease)      HIV (human immunodeficiency virus infection) (H)      Periungual wart      Sleep apnea     doesn't use CPAP       Past Surgical History:   Procedure Laterality Date     C NONSPECIFIC PROCEDURE      right forearm fracture     COLONOSCOPY Left 1/22/2016    Procedure: COMBINED COLONOSCOPY, SINGLE OR MULTIPLE BIOPSY/POLYPECTOMY BY BIOPSY;  Surgeon: Clark Saini MD;  Location:  GI     HC EXPLORE UNDESC TESTIS,INGUIN/SCROTAL       LAPAROSCOPY DIAGNOSTIC (GENERAL) N/A 7/26/2016    Procedure: LAPAROSCOPY DIAGNOSTIC (GENERAL);  Surgeon: Susannah Arriaga MD;  Location:  OR     OPTICAL TRACKING SYSTEM CRANIOTOMY, EXCISE TUMOR, COMBINED Left 4/10/2015    Procedure: COMBINED OPTICAL TRACKING SYSTEM CRANIOTOMY, EXCISE TUMOR;  Surgeon: Mirlande Colmenares MD;  Location:  OR     REPAIR GAMEKEEPER'S THUMB Right 12/2/2016     Procedure: REPAIR LIGAMENT ULNAR COLLATERAL THUMB (GAMEKEEPER'S);  Surgeon: Evin Zamorano MD;  Location: [facility]           Family History:     Family History   Problem Relation Age of Onset     DIABETES Brother      DIABETES Father      Alzheimer Disease Father      Unknown/Adopted Mother      DIABETES Paternal Grandfather      CANCER No family hx of      no skin cancer     Skin Cancer No family hx of      no famiy hx of skin cancer           Social History:     Social History   Substance Use Topics     Smoking status: Current Every Day Smoker     Packs/day: 0.20     Last attempt to quit: 10/28/2016     Smokeless tobacco: Former User      Comment: 4 cigarettes     Alcohol use No      Comment: Last etoh in 2007     Social History     Social History Narrative    Born in [facility].  [place]ame to the [place] in 1993.  Last traveled to visit family in 2008.                 Review of Systems:   CONSTITUTIONAL: negative fevers or chills   GASTROINTESTINAL: negative for nausea, vomiting, diarrhea  INTEGUMENT: negative for new rash  NEURO: Negative for headache         Current Medications:     Current Outpatient Prescriptions   Medication Sig Dispense Refill     benzonatate (TESSALON) 100 MG capsule Take 1 capsule (100 mg) by mouth 3 times daily as needed for cough 30 capsule 0     pseudoePHEDrine (SUDAFED) 30 MG tablet Take 2 tablets (60 mg) by mouth every 6 hours as needed for congestion 112 tablet 0     metFORMIN (GLUCOPHAGE) 500 MG tablet TAKE TWO TABLETS BY MOUTH TWICE A DAY WITH MEALS 120 tablet 3     azithromycin (ZITHROMAX) 600 MG tablet TAKE 2 TABLETS (1200 MG) BY MOUTH ONCE EVERY 7 DAYS (Patient taking differently: TAKE 1 TABLET (600 mg) BY MOUTH ONCE EVERY 7 DAYS) 8 tablet 11     sulfamethoxazole-trimethoprim (BACTRIM DS/SEPTRA DS) 800-160 MG per tablet TAKE 1 TABLET BY MOUTH 2 TIMES DAILY (Patient taking differently: 4 tablets 2 times daily TAKE 1 TABLET BY MOUTH 2 TIMES DAILY) 60 tablet 5     gabapentin  "(NEURONTIN) 300 MG capsule Take 1 capsule (300 mg) by mouth 3 times daily 90 capsule 5     GENVOYA [phone] mg tablet Take 1 tablet by mouth daily 30 tablet 5     omeprazole (PRILOSEC) 20 MG CR capsule TAKE ONE CAPSULE BY MOUTH EVERY DAY 60 capsule 3     blood glucose monitoring (NO BRAND SPECIFIED) meter device kit Use to test blood sugar four times daily or as directed. 1 kit 0     blood glucose monitoring (NO BRAND SPECIFIED) test strip Use to test blood sugars four times daily or as directed 100 each 5     blood glucose (NO BRAND SPECIFIED) lancets standard Use to test blood sugar four times daily or as directed. 100 Box 5            Immunization History:     Immunization History   Administered Date(s) Administered     HepB 03/10/2016, 06/16/2016     HepB-Adult 08/31/2017     Influenza (IIV3) PF 10/17/2016     Influenza Vaccine IM 3yrs+ 4 Valent IIV4 12/22/2015, 12/14/2017     Mantoux Tuberculin Skin Test 04/12/2015     Meningococcal (Menactra ) 08/31/2015, 03/10/2016     Pneumo Conj 13-V (2010&after) 03/10/2016     Pneumococcal 23 valent 06/16/2016     TDAP Vaccine (Boostrix) 10/17/2016            Allergies:     Allergies   Allergen Reactions     Milk [Lac Bovis] Hives     Tylenol [Acetaminophen] Itching            Physical Exam:   /88  Pulse 101  Temp 97.7  F (36.5  C) (Oral)  Ht 1.626 m (5' 4\")  Wt 74.5 kg (164 lb 3.2 oz)  BMI 28.18 kg/m2    Physical Examination:         Physical Exam:   /88  Pulse 101  Temp 97.7  F (36.5  C) (Oral)  Ht 1.626 m (5' 4\")  Wt 74.5 kg (164 lb 3.2 oz)  BMI 28.18 kg/m2  Physical Examination:  GENERAL:  well-developed, well-nourished, seated in no acute distress.  HEENT:  Head is normocephalic, atraumatic   EYES:  Eyes have anicteric sclerae without conjunctival injection   SKIN:  No acute rashes. .   NEUROLOGIC:  Grossly nonfocal. Active x4 extremities          Laboratory Data:     CD4 Count  Absolute CD4   Date Value Ref Range Status   12/12/2017 225 " (L) 441 - 2156 cells/uL Final     CD4 Milbank T   Date Value Ref Range Status   12/12/2017 10 (L) 28 - 63 % Final     CD4:CD8 Ratio   Date Value Ref Range Status   12/12/2017 0.29 (L) 1.40 - 2.60 Final       HIV-1 RNA Quantitative:  HIV-1 RNA Quant Result   Date Value Ref Range Status   12/12/2017 52 (A) HIVND^HIV-1 RNA Not Detected [Copies]/mL Final     Comment:     The DAMON AmpliPrep/DAMON TaqMan HIV-1 test is an FDA-approved in vitro   nucleic acid amplification test for the quantitation of HIV-1 RNA in human   plasma (EDTA plasma) using the DAMON AmpliPrep instrument for automated viral   nucleic acid extraction and the DAMON TaqMan Analyzer or DAMON TaqMan for   automated Real Time PCR amplification and detection of the viral nucleic acid   target.  Titer results are reported in copies/ml. This assay is intended for use in   conjunction with clinical presentation and other laboratory markers of disease   prognosis and for use as an aid in assessing viral response to antiretroviral   treatment as measured by changes in plasma HIV-1 RNA levels. This test should   not be used as a donor screening test to confirm the presence of HIV-1   infection.         Metabolic Studies       Sodium   Date Value Ref Range Status   12/12/2017 135 133 - 144 mmol/L Final   11/07/2017 138 133 - 144 mmol/L Final     Potassium   Date Value Ref Range Status   12/12/2017 3.4 3.4 - 5.3 mmol/L Final   11/07/2017 3.2 (L) 3.4 - 5.3 mmol/L Final     Chloride   Date Value Ref Range Status   12/12/2017 103 94 - 109 mmol/L Final   11/07/2017 104 94 - 109 mmol/L Final     Carbon Dioxide   Date Value Ref Range Status   12/12/2017 23 20 - 32 mmol/L Final   11/07/2017 26 20 - 32 mmol/L Final     Anion Gap   Date Value Ref Range Status   12/12/2017 9 3 - 14 mmol/L Final   11/07/2017 8 3 - 14 mmol/L Final     Urea Nitrogen   Date Value Ref Range Status   12/12/2017 14 7 - 30 mg/dL Final   11/07/2017 12 7 - 30 mg/dL Final     Creatinine   Date Value  Ref Range Status   12/12/2017 0.88 0.66 - 1.25 mg/dL Final   11/07/2017 0.56 (L) 0.66 - 1.25 mg/dL Final     GFR Estimate   Date Value Ref Range Status   12/12/2017 >90 >60 mL/min/1.7m2 Final     Comment:     Non  GFR Calc   11/07/2017 >90 >60 mL/min/1.7m2 Final     Comment:     Non  GFR Calc     Glucose   Date Value Ref Range Status   12/12/2017 163 (H) 70 - 99 mg/dL Final   11/07/2017 123 (H) 70 - 99 mg/dL Final     Hemoglobin A1C   Date Value Ref Range Status   08/11/2017 6.0 4.3 - 6.0 % Final     Calcium   Date Value Ref Range Status   12/12/2017 8.6 8.5 - 10.1 mg/dL Final   11/07/2017 8.8 8.5 - 10.1 mg/dL Final     Phosphorus   Date Value Ref Range Status   11/07/2017 2.6 2.5 - 4.5 mg/dL Final   12/26/2016 2.9 2.5 - 4.5 mg/dL Final     Magnesium   Date Value Ref Range Status   11/07/2017 2.0 1.6 - 2.3 mg/dL Final   12/28/2016 2.0 1.6 - 2.3 mg/dL Final     Lactic Acid   Date Value Ref Range Status   11/07/2017 2.0 0.7 - 2.0 mmol/L Final   11/07/2017 2.2 (H) 0.7 - 2.1 mmol/L Final       Hepatic Studies    Bilirubin Total   Date Value Ref Range Status   12/12/2017 0.6 0.2 - 1.3 mg/dL Final   11/06/2017 0.5 0.2 - 1.3 mg/dL Final     Alkaline Phosphatase   Date Value Ref Range Status   12/12/2017 103 40 - 150 U/L Final   11/06/2017 146 40 - 150 U/L Final     Albumin   Date Value Ref Range Status   12/12/2017 3.7 3.4 - 5.0 g/dL Final   11/06/2017 3.8 3.4 - 5.0 g/dL Final     AST   Date Value Ref Range Status   12/12/2017 24 0 - 45 U/L Final   11/06/2017 52 (H) 0 - 45 U/L Final     ALT   Date Value Ref Range Status   12/12/2017 30 0 - 70 U/L Final   11/06/2017 51 0 - 70 U/L Final       Hematology Studies      WBC   Date Value Ref Range Status   12/12/2017 7.2 4.0 - 11.0 10e9/L Final   11/07/2017 8.3 4.0 - 11.0 10e9/L Final     Absolute Neutrophil   Date Value Ref Range Status   12/12/2017 3.9 1.6 - 8.3 10e9/L Final   11/07/2017 5.5 1.6 - 8.3 10e9/L Final     Absolute Lymphocytes   Date  Value Ref Range Status   12/12/2017 2.4 0.8 - 5.3 10e9/L Final   11/07/2017 2.0 0.8 - 5.3 10e9/L Final     Absolute Monocytes   Date Value Ref Range Status   12/12/2017 0.6 0.0 - 1.3 10e9/L Final   11/07/2017 0.6 0.0 - 1.3 10e9/L Final     Absolute Eosinophils   Date Value Ref Range Status   12/12/2017 0.3 0.0 - 0.7 10e9/L Final   11/07/2017 0.1 0.0 - 0.7 10e9/L Final     Hemoglobin   Date Value Ref Range Status   12/12/2017 13.7 13.3 - 17.7 g/dL Final   11/07/2017 14.1 13.3 - 17.7 g/dL Final     Hematocrit   Date Value Ref Range Status   12/12/2017 40.4 40.0 - 53.0 % Final   11/07/2017 40.7 40.0 - 53.0 % Final     Platelet Count   Date Value Ref Range Status   12/12/2017 222 150 - 450 10e9/L Final   11/07/2017 163 150 - 450 10e9/L Final       Hepatitis B Testing     Recent Labs   Lab Test  04/13/15   0816   HBCAB  Nonreactive   HEPBANG  Nonreactive   HBCM  Nonreactive   A nonreactive result suggests lack of recent exposure to the virus in the   preceding 6 months.     HBEABY  Negative  Reference range: Negative  (Note)  Performed by [facility],  [address] [place] [address],[place] [address] [phone]  [email or web address], Nayan Corley MD, Lab. Director     HBEAGN  Negative  Reference range: Negative  (Note)  Performed by [facility],  [address] [facility] [address] [phone]  [email or web address], Nayan Corley MD, Lab. Director         Hepatitis C Testing     Hepatitis C Antibody   Date Value Ref Range Status   03/02/2017  NR Final    Nonreactive   Assay performance characteristics have not been established for newborns,   infants, and children     04/13/2015  NR Final    Nonreactive   Assay performance characteristics have not been established for newborns,   infants, and children       Imaging:  No results found for this or any previous visit (from the past 744 hour(s)).      Sharon Rosado MD

## 2017-12-14 NOTE — MR AVS SNAPSHOT
After Visit Summary   12/14/2017    Andrew Lockwood    MRN: [id number]           Patient Information     Date Of Birth          11/27/1967        Visit Information        Provider Department      12/14/2017 1:00 PM Sharon Rosado MD [facility]        Today's Diagnoses     Human immunodeficiency virus I infection (H)    -  1    Health care maintenance        Type 2 diabetes mellitus without complication, without long-term current use of insulin (H)        Gender dysphoria in adult           Follow-ups after your visit        Who to contact     If you have questions or need follow up information about today's clinic visit or your schedule please contact [facility] directly at [phone].  Normal or non-critical lab and imaging results will be communicated to you by MyChart, letter or phone within 4 business days after the clinic has received the results. If you do not hear from us within 7 days, please contact the clinic through [facility] or phone. If you have a critical or abnormal lab result, we will notify you by phone as soon as possible.  Submit refill requests through [employer] or call your pharmacy and they will forward the refill request to us. Please allow 3 business days for your refill to be completed.          Additional Information About Your Visit        MyChart Information     [facility] gives you secure access to your electronic health record. If you see a primary care provider, you can also send messages to your care team and make appointments. If you have questions, please call your primary care clinic.  If you do not have a primary care provider, please call [phone] and they will assist you.        Care EveryWhere ID     This is your Care EveryWhere ID. This could be used by other organizations to access your [place] medical records  [id number]        Your Vitals Were     Pulse Temperature  "Height BMI (Body Mass Index)          101 97.7  F (36.5  C) (Oral) 1.626 m (5' 4\") 28.18 kg/m2         Blood Pressure from Last 3 Encounters:   12/14/17 153/88   12/11/17 152/86   11/07/17 107/73    Weight from Last 3 Encounters:   12/14/17 74.5 kg (164 lb 3.2 oz)   12/11/17 77.1 kg (170 lb)   11/06/17 77.1 kg (170 lb)              Today, you had the following     No orders found for display         Today's Medication Changes          These changes are accurate as of: 12/14/17 11:59 PM.  If you have any questions, ask your nurse or doctor.               These medicines have changed or have updated prescriptions.        Dose/Directions    azithromycin 600 MG tablet   Commonly known as:  ZITHROMAX   This may have changed:  See the new instructions.   Used for:  Human immunodeficiency virus (HIV) disease        TAKE 2 TABLETS (1200 MG) BY MOUTH ONCE EVERY 7 DAYS   Quantity:  8 tablet   Refills:  11       sulfamethoxazole-trimethoprim 800-160 MG per tablet   Commonly known as:  BACTRIM DS/SEPTRA DS   This may have changed:    - how much to take  - when to take this  - additional instructions   Used for:  Toxoplasma encephalitis (H), Human immunodeficiency virus (HIV) disease        TAKE 1 TABLET BY MOUTH 2 TIMES DAILY   Quantity:  60 tablet   Refills:  5                Primary Care Provider Office Phone # Fax #    Shanta Munoz [phone]       [place] [address]        Equal Access to Services     RA JORDAN AH: Apollo stephens Soterence, waaxda luqadaha, qaybta kaalmada adetamiko, waxay merline rodriguez adeecho salazar. So [facility] [phone].    ATENCIÓN: Si habla dorothea, tiene a kohli disposición servicios gratuitos de asistencia lingüística. Llame al [phone].    We comply with applicable federal civil rights laws and [place] laws. We do not discriminate on the basis of race, color, national origin, age, disability, sex, sexual orientation, or gender " identity.            Thank you!     Thank you for choosing [facility]  for your care. Our goal is always to provide you with excellent care. Hearing back from our patients is one way we can continue to improve our services. Please take a few minutes to complete the written survey that you may receive in the mail after your visit with us. Thank you!             Your Updated Medication List - Protect others around you: Learn how to safely use, store and throw away your medicines at www.disposemymeds.org.          This list is accurate as of: 12/14/17 11:59 PM.  Always use your most recent med list.                   Brand Name Dispense Instructions for use Diagnosis    azithromycin 600 MG tablet    ZITHROMAX    8 tablet    TAKE 2 TABLETS (1200 MG) BY MOUTH ONCE EVERY 7 DAYS    Human immunodeficiency virus (HIV) disease       benzonatate 100 MG capsule    TESSALON    30 capsule    Take 1 capsule (100 mg) by mouth 3 times daily as needed for cough        blood glucose lancets standard    no brand specified    100 Box    Use to test blood sugar four times daily or as directed.    Type 2 diabetes mellitus without complication, without long-term current use of insulin (H)       blood glucose monitoring meter device kit    no brand specified    1 kit    Use to test blood sugar four times daily or as directed.    Type 2 diabetes mellitus without complication, without long-term current use of insulin (H)       blood glucose monitoring test strip    no brand specified    100 each    Use to test blood sugars four times daily or as directed    Type 2 diabetes mellitus without complication, without long-term current use of insulin (H)       Mjgergw-Cdrix-Bwldcxck-TenofAF 435-044-250-10 MG Tabs per tablet    GENVOYA    30 tablet    Take 1 tablet by mouth daily    Human immunodeficiency virus (HIV) disease       gabapentin 300 MG capsule    NEURONTIN    90 capsule    Take 1 capsule (300 mg) by  mouth 3 times daily    Herpes zoster without complication       metFORMIN 500 MG tablet    GLUCOPHAGE    120 tablet    TAKE TWO TABLETS BY MOUTH TWICE A DAY WITH MEALS    DM (diabetes mellitus) (H)       omeprazole 20 MG CR capsule    priLOSEC    60 capsule    TAKE ONE CAPSULE BY MOUTH EVERY DAY    Epigastric pain       pseudoePHEDrine 30 MG tablet    SUDAFED    112 tablet    Take 2 tablets (60 mg) by mouth every 6 hours as needed for congestion        sulfamethoxazole-trimethoprim 800-160 MG per tablet    BACTRIM DS/SEPTRA DS    60 tablet    TAKE 1 TABLET BY MOUTH 2 TIMES DAILY    Toxoplasma encephalitis (H), Human immunodeficiency virus (HIV) disease

## 2017-12-14 NOTE — LETTER
12/14/2017       RE: Andrew Lockwood  [address]   [facility] [address]     Dear Colleague,    Thank you for referring your patient, Andrew Lockwood, to the [facility] AND INFECTIOUS DISEASES at [facility]. Please see a copy of my visit note below.    [facility] - HIV Progress Note  Dr. Sharon Rosado, [facility], [facility], [address] [place] [address], Sixth Floor, [address]  [place] [address]  Patient:  Andrew Lockwood, Date of birth 11/27/1967, Medical record number [id number]  Date of Visit: Dec 14, 2017         Assessment and Recommendations:     HIV -   Continue on Genvoya. He has had a recent interruption in therapy because of incarceration. Continue Bactrim prophylaxis as well.  Today, we discussed in some detail what he should do if he ends up being deported.  For the most part, from a resistance standpoint (Andrew does not have any documented resistance).  He can take about any therapy.  However, Andrew experienced significant side effects on the other regim    DMII   I counseled Mr. Lockwood on this, reviewing glucose levels and Hemoglobin A1C levels.  I encouraged him to follow up his PCP for this.     Symptoms of Gender Dysphoria  Andrew is interested in hormonally and surgically transitioning to becoming a woman.  I have had a long healthcare relationship with Andrew and this is the first time that he is mentioning these feelings.  In the past he has wanted to have testosterone therapy.  He reports that this is because of his Denominational.  However, with the major life stress that Andrew is currently under and his history of structural brain disease which we have had concerns about in the past with reference to his healthcare decision capacity, I would prefer that he undergo a more comprehensive evaluation for this at the [facility] with  [facility].  I discussed this with Simon Cadena to help Andrew arrange this.     Impending Homelessness  Mr. Lockwood is about to lose his housing, although he does not seem to be too concerned about this at present.  He went through a difficult patch with this last year that was very difficult for him.  Dalila Montes and I discussed options for him in depth today.     Preventative health care  Cardiovascular Juan Jose -               Lipids - CHOL 180, LDL 72 (8/18/2016)              Blood Pressure -  /88 today.  Will continue to monitor, again, he needs follow up with his PCP.   Cancer Screening              Colon - Up to date. [facility]   Anal - Previous anal pap with insufficient cells.  He has been referred to colorectal, but he has not yet made an appointment  Immunizations              Hepatitis A - immune, serology 4/13/15              Hepatitis B - 6/16/2016, 3/10/2016, 8/31/2017              Influenza - Up To date              Pneumovax/Prevnar - Up to date               Tdap - 6/8/14 per [facility]              Menactra - Up to date.    Renal Health -  UA without proteinuria and creatinine normal in 12/2016.    Sexually Transmitted Infection Risk and Screening              Gonorrhea and Chlamydia - GC/chlamydia negative on 3/2017              Syphilis - negative 3/2017    I spent more than 40 minutes today in face to fact time managing the care of Andrew Lockwood.  Over 50% of my time on the unit was spent counseling the patient and/or coordinating care regarding services listed in this note.    Sharon Rosado MD  Division of Infectious Diseases and International Medicine  [phone]         History of Infectious Disease Illness:   Mr. Lockwood is a 50 year old man who presents with routine follow up.  We have had difficulty getting Andrew in because he has had several weeks of incarceration. It is not clear what precipitated this, but it sounds like it was immigration status related.  We  have regularly been in touch with his  on this issue.  He is also about to be evicted from his home although it is not clear if [facility] will be able to make him leave in the winter.  Andrew has been working on getting money from someone in [place] or someone from his Anabaptism.  Today he reports that he is interesting in transitioning to a woman and he would like both hormonal therapy and gender reassignment surgery.  He reports that he thinks that he no longer has DM.         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 They did not live together.  Historical use of ARVs: Triumeq, Genvoya  Historical Resistance Mutations: Susceptible        Past Medical and Surgical History:     Past Medical History:   Diagnosis Date     AIDS (H)      Allergic rhinitis due to other allergen     DNS     Chronic abdominal pain      CNS toxoplasmosis (H)      Diabetes type 2, controlled (H)      GERD (gastroesophageal reflux disease)      HIV (human immunodeficiency virus infection) (H)      Periungual wart      Sleep apnea     doesn't use CPAP       Past Surgical History:   Procedure Laterality Date     C NONSPECIFIC PROCEDURE      right forearm fracture     COLONOSCOPY Left 1/22/2016    Procedure: COMBINED COLONOSCOPY, SINGLE OR MULTIPLE BIOPSY/POLYPECTOMY BY BIOPSY;  Surgeon: Clark Saini MD;  Location:  GI     HC EXPLORE UNDESC TESTIS,INGUIN/SCROTAL       LAPAROSCOPY DIAGNOSTIC (GENERAL) N/A 7/26/2016    Procedure: LAPAROSCOPY DIAGNOSTIC (GENERAL);  Surgeon: Susannah Arriaga MD;  Location:  OR     OPTICAL TRACKING SYSTEM  CRANIOTOMY, EXCISE TUMOR, COMBINED Left 4/10/2015    Procedure: COMBINED OPTICAL TRACKING SYSTEM CRANIOTOMY, EXCISE TUMOR;  Surgeon: Mirlande Colmenares MD;  Location: [facility] OR     REPAIR GAMEKEEPER'S THUMB Right 12/2/2016    Procedure: REPAIR LIGAMENT ULNAR COLLATERAL THUMB (GAMEKEEPER'S);  Surgeon: Evin Zamorano MD;  Location: [facility] OR           Family History:     Family History   Problem Relation Age of Onset     DIABETES Brother      DIABETES Father      Alzheimer Disease Father      Unknown/Adopted Mother      DIABETES Paternal Grandfather      CANCER No family hx of      no skin cancer     Skin Cancer No family hx of      no famiy hx of skin cancer           Social History:     Social History   Substance Use Topics     Smoking status: Current Every Day Smoker     Packs/day: 0.20     Last attempt to quit: 10/28/2016     Smokeless tobacco: Former User      Comment: 4 cigarettes     Alcohol use No      Comment: Last etoh in 2007     Social History     Social History Narrative    Born in [facility].  [place]ame to the [place] in 1993.  Last traveled to visit family in 2008.                 Review of Systems:   CONSTITUTIONAL: negative fevers or chills   GASTROINTESTINAL: negative for nausea, vomiting, diarrhea  INTEGUMENT: negative for new rash  NEURO: Negative for headache         Current Medications:     Current Outpatient Prescriptions   Medication Sig Dispense Refill     benzonatate (TESSALON) 100 MG capsule Take 1 capsule (100 mg) by mouth 3 times daily as needed for cough 30 capsule 0     pseudoePHEDrine (SUDAFED) 30 MG tablet Take 2 tablets (60 mg) by mouth every 6 hours as needed for congestion 112 tablet 0     metFORMIN (GLUCOPHAGE) 500 MG tablet TAKE TWO TABLETS BY MOUTH TWICE A DAY WITH MEALS 120 tablet 3     azithromycin (ZITHROMAX) 600 MG tablet TAKE 2 TABLETS (1200 MG) BY MOUTH ONCE EVERY 7 DAYS (Patient taking differently: TAKE 1 TABLET (600 mg) BY MOUTH ONCE EVERY 7 DAYS) 8 tablet 11      "sulfamethoxazole-trimethoprim (BACTRIM DS/SEPTRA DS) 800-160 MG per tablet TAKE 1 TABLET BY MOUTH 2 TIMES DAILY (Patient taking differently: 4 tablets 2 times daily TAKE 1 TABLET BY MOUTH 2 TIMES DAILY) 60 tablet 5     gabapentin (NEURONTIN) 300 MG capsule Take 1 capsule (300 mg) by mouth 3 times daily 90 capsule 5     GENVOYA [phone] mg tablet Take 1 tablet by mouth daily 30 tablet 5     omeprazole (PRILOSEC) 20 MG CR capsule TAKE ONE CAPSULE BY MOUTH EVERY DAY 60 capsule 3     blood glucose monitoring (NO BRAND SPECIFIED) meter device kit Use to test blood sugar four times daily or as directed. 1 kit 0     blood glucose monitoring (NO BRAND SPECIFIED) test strip Use to test blood sugars four times daily or as directed 100 each 5     blood glucose (NO BRAND SPECIFIED) lancets standard Use to test blood sugar four times daily or as directed. 100 Box 5            Immunization History:     Immunization History   Administered Date(s) Administered     HepB 03/10/2016, 06/16/2016     HepB-Adult 08/31/2017     Influenza (IIV3) PF 10/17/2016     Influenza Vaccine IM 3yrs+ 4 Valent IIV4 12/22/2015, 12/14/2017     Mantoux Tuberculin Skin Test 04/12/2015     Meningococcal (Menactra ) 08/31/2015, 03/10/2016     Pneumo Conj 13-V (2010&after) 03/10/2016     Pneumococcal 23 valent 06/16/2016     TDAP Vaccine (Boostrix) 10/17/2016            Allergies:     Allergies   Allergen Reactions     Milk [Lac Bovis] Hives     Tylenol [Acetaminophen] Itching            Physical Exam:   /88  Pulse 101  Temp 97.7  F (36.5  C) (Oral)  Ht 1.626 m (5' 4\")  Wt 74.5 kg (164 lb 3.2 oz)  BMI 28.18 kg/m2    Physical Examination:         Physical Exam:   /88  Pulse 101  Temp 97.7  F (36.5  C) (Oral)  Ht 1.626 m (5' 4\")  Wt 74.5 kg (164 lb 3.2 oz)  BMI 28.18 kg/m2  Physical Examination:  GENERAL:  well-developed, well-nourished, seated in no acute distress.  HEENT:  Head is normocephalic, atraumatic   EYES:  Eyes have " anicteric sclerae without conjunctival injection   SKIN:  No acute rashes. .   NEUROLOGIC:  Grossly nonfocal. Active x4 extremities          Laboratory Data:     CD4 Count  Absolute CD4   Date Value Ref Range Status   12/12/2017 225 (L) 441 - 2156 cells/uL Final     CD4 [place] T   Date Value Ref Range Status   12/12/2017 10 (L) 28 - 63 % Final     CD4:CD8 Ratio   Date Value Ref Range Status   12/12/2017 0.29 (L) 1.40 - 2.60 Final       HIV-1 RNA Quantitative:  HIV-1 RNA Quant Result   Date Value Ref Range Status   12/12/2017 52 (A) HIVND^HIV-1 RNA Not Detected [Copies]/mL Final     Comment:     The DAMON AmpliPrep/DAMON TaqMan HIV-1 test is an FDA-approved in vitro   nucleic acid amplification test for the quantitation of HIV-1 RNA in human   plasma (EDTA plasma) using the DAMON AmpliPrep instrument for automated viral   nucleic acid extraction and the DAMON TaqMan Analyzer or DAMON TaqMan for   automated Real Time PCR amplification and detection of the viral nucleic acid   target.  Titer results are reported in copies/ml. This assay is intended for use in   conjunction with clinical presentation and other laboratory markers of disease   prognosis and for use as an aid in assessing viral response to antiretroviral   treatment as measured by changes in plasma HIV-1 RNA levels. This test should   not be used as a donor screening test to confirm the presence of HIV-1   infection.         Metabolic Studies       Sodium   Date Value Ref Range Status   12/12/2017 135 133 - 144 mmol/L Final   11/07/2017 138 133 - 144 mmol/L Final     Potassium   Date Value Ref Range Status   12/12/2017 3.4 3.4 - 5.3 mmol/L Final   11/07/2017 3.2 (L) 3.4 - 5.3 mmol/L Final     Chloride   Date Value Ref Range Status   12/12/2017 103 94 - 109 mmol/L Final   11/07/2017 104 94 - 109 mmol/L Final     Carbon Dioxide   Date Value Ref Range Status   12/12/2017 23 20 - 32 mmol/L Final   11/07/2017 26 20 - 32 mmol/L Final     Anion Gap   Date Value  Ref Range Status   12/12/2017 9 3 - 14 mmol/L Final   11/07/2017 8 3 - 14 mmol/L Final     Urea Nitrogen   Date Value Ref Range Status   12/12/2017 14 7 - 30 mg/dL Final   11/07/2017 12 7 - 30 mg/dL Final     Creatinine   Date Value Ref Range Status   12/12/2017 0.88 0.66 - 1.25 mg/dL Final   11/07/2017 0.56 (L) 0.66 - 1.25 mg/dL Final     GFR Estimate   Date Value Ref Range Status   12/12/2017 >90 >60 mL/min/1.7m2 Final     Comment:     Non  GFR Calc   11/07/2017 >90 >60 mL/min/1.7m2 Final     Comment:     Non  GFR Calc     Glucose   Date Value Ref Range Status   12/12/2017 163 (H) 70 - 99 mg/dL Final   11/07/2017 123 (H) 70 - 99 mg/dL Final     Hemoglobin A1C   Date Value Ref Range Status   08/11/2017 6.0 4.3 - 6.0 % Final     Calcium   Date Value Ref Range Status   12/12/2017 8.6 8.5 - 10.1 mg/dL Final   11/07/2017 8.8 8.5 - 10.1 mg/dL Final     Phosphorus   Date Value Ref Range Status   11/07/2017 2.6 2.5 - 4.5 mg/dL Final   12/26/2016 2.9 2.5 - 4.5 mg/dL Final     Magnesium   Date Value Ref Range Status   11/07/2017 2.0 1.6 - 2.3 mg/dL Final   12/28/2016 2.0 1.6 - 2.3 mg/dL Final     Lactic Acid   Date Value Ref Range Status   11/07/2017 2.0 0.7 - 2.0 mmol/L Final   11/07/2017 2.2 (H) 0.7 - 2.1 mmol/L Final       Hepatic Studies    Bilirubin Total   Date Value Ref Range Status   12/12/2017 0.6 0.2 - 1.3 mg/dL Final   11/06/2017 0.5 0.2 - 1.3 mg/dL Final     Alkaline Phosphatase   Date Value Ref Range Status   12/12/2017 103 40 - 150 U/L Final   11/06/2017 146 40 - 150 U/L Final     Albumin   Date Value Ref Range Status   12/12/2017 3.7 3.4 - 5.0 g/dL Final   11/06/2017 3.8 3.4 - 5.0 g/dL Final     AST   Date Value Ref Range Status   12/12/2017 24 0 - 45 U/L Final   11/06/2017 52 (H) 0 - 45 U/L Final     ALT   Date Value Ref Range Status   12/12/2017 30 0 - 70 U/L Final   11/06/2017 51 0 - 70 U/L Final       Hematology Studies      WBC   Date Value Ref Range Status   12/12/2017  7.2 4.0 - 11.0 10e9/L Final   11/07/2017 8.3 4.0 - 11.0 10e9/L Final     Absolute Neutrophil   Date Value Ref Range Status   12/12/2017 3.9 1.6 - 8.3 10e9/L Final   11/07/2017 5.5 1.6 - 8.3 10e9/L Final     Absolute Lymphocytes   Date Value Ref Range Status   12/12/2017 2.4 0.8 - 5.3 10e9/L Final   11/07/2017 2.0 0.8 - 5.3 10e9/L Final     Absolute Monocytes   Date Value Ref Range Status   12/12/2017 0.6 0.0 - 1.3 10e9/L Final   11/07/2017 0.6 0.0 - 1.3 10e9/L Final     Absolute Eosinophils   Date Value Ref Range Status   12/12/2017 0.3 0.0 - 0.7 10e9/L Final   11/07/2017 0.1 0.0 - 0.7 10e9/L Final     Hemoglobin   Date Value Ref Range Status   12/12/2017 13.7 13.3 - 17.7 g/dL Final   11/07/2017 14.1 13.3 - 17.7 g/dL Final     Hematocrit   Date Value Ref Range Status   12/12/2017 40.4 40.0 - 53.0 % Final   11/07/2017 40.7 40.0 - 53.0 % Final     Platelet Count   Date Value Ref Range Status   12/12/2017 222 150 - 450 10e9/L Final   11/07/2017 163 150 - 450 10e9/L Final       Hepatitis B Testing     Recent Labs   Lab Test  04/13/15   0816   HBCAB  Nonreactive   HEPBANG  Nonreactive   HBCM  Nonreactive   A nonreactive result suggests lack of recent exposure to the virus in the   preceding 6 months.     HBEABY  Negative  Reference range: Negative  (Note)  Performed by [facility],  [address] [facility] [place] [address] [phone]  [email or web address], Nayan Corley MD, Lab. Director     HBEAGN  Negative  Reference range: Negative  (Note)  Performed by [facility],  [address] [facility],[place] [id number] [phone]  [email or web address], Nayan Corley MD, Lab. Director         Hepatitis C Testing     Hepatitis C Antibody   Date Value Ref Range Status   03/02/2017  NR Final    Nonreactive   Assay performance characteristics have not been established for newborns,   infants, and children     04/13/2015  NR Final    Nonreactive   Assay performance characteristics have not been established for newborns,   infants, and  children       Imaging:  No results found for this or any previous visit (from the past 744 hour(s)).    Sincerely,    Sharon Rosado MD

## 2017-12-14 NOTE — PROGRESS NOTES
Per Dr Rosado via face to face conversation in clinic, OK to put in order for flu vaccine.  Dalila Wilson RN

## 2017-12-14 NOTE — NURSING NOTE
"Injectable Influenza Immunization Documentation    1.  Has the patient received the information for the injectable influenza vaccine? YES     2. Is the patient 6 months of age or older? YES     3. Does the patient have any of the following contraindications?         Severe allergy to eggs? No     Severe allergic reaction to previous influenza vaccines? No   Severe allergy to latex? No       History of Guillain-[place] syndrome? No     Currently have a temperature greater than 100.4F? No        4.  Severely egg allergic patients should have flu vaccine eligibility assessed by an MD, RN, or pharmacist, and those who received flu vaccine should be observed for 15 min by an MD, RN, Pharmacist, Medical Technician, or member of clinic staff.\": YES    5. Latex-allergic patients should be given latex-free influenza vaccine Yes. Please reference the Vaccine latex table to determine if your clinic s product is latex-containing.       Vaccination given by Wendi Loaiza          "

## 2017-12-15 LAB
HIV1 RNA # PLAS NAA DL=20: 52 {COPIES}/ML
HIV1 RNA SERPL NAA+PROBE-LOG#: 1.7 {LOG_COPIES}/ML

## 2017-12-20 NOTE — PROGRESS NOTES
[facility] - HIV Progress Note  Dr. Sharon Rosado, [facility], [facility], [address] [place] [address], Sixth Floor, Clinic [address]  [place] [address]  Patient:  Andrew Lockwood, Date of birth 11/27/1967, Medical record number [id number]  Date of Visit: Dec 14, 2017         Assessment and Recommendations:     HIV -   Continue on Genvoya. He has had a recent interruption in therapy because of incarceration. Continue Bactrim prophylaxis as well.  Today, we discussed in some detail what he should do if he ends up being deported.  For the most part, from a resistance standpoint (Andrew does not have any documented resistance).  He can take about any therapy.  However, Andrew experienced significant side effects on the other regim    DMII   I counseled Mr. Lockwood on this, reviewing glucose levels and Hemoglobin A1C levels.  I encouraged him to follow up his PCP for this.     Symptoms of Gender Dysphoria  Andrew is interested in hormonally and surgically transitioning to becoming a woman.  I have had a long healthcare relationship with Andrew and this is the first time that he is mentioning these feelings.  In the past he has wanted to have testosterone therapy.  He reports that this is because of his Hindu.  However, with the major life stress that Andrew is currently under and his history of structural brain disease which we have had concerns about in the past with reference to his healthcare decision capacity, I would prefer that he undergo a more comprehensive evaluation for this at the [facility] with [facility].  I discussed this with Simon Cadena to help Andrew arrange this.     Impending Homelessness  Mr. Lockwood is about to lose his housing, although he does not seem to be too concerned about this at present.  He went through a difficult patch with this last year that was very  difficult for him.  Dalila Montes and I discussed options for him in depth today.     Preventative health care  Cardiovascular Juan Jose -               Lipids - CHOL 180, LDL 72 (8/18/2016)              Blood Pressure -  /88 today.  Will continue to monitor, again, he needs follow up with his PCP.   Cancer Screening              Colon - Up to date. [facility]   Anal - Previous anal pap with insufficient cells.  He has been referred to colorectal, but he has not yet made an appointment  Immunizations              Hepatitis A - immune, serology 4/13/15              Hepatitis B - 6/16/2016, 3/10/2016, 8/31/2017              Influenza - Up To date              Pneumovax/Prevnar - Up to date               Tdap - 6/8/14 per [facility]              Menactra - Up to date.    Renal Health -  UA without proteinuria and creatinine normal in 12/2016.    Sexually Transmitted Infection Risk and Screening              Gonorrhea and Chlamydia - GC/chlamydia negative on 3/2017              Syphilis - negative 3/2017    I spent more than 40 minutes today in face to fact time managing the care of Andrew Lockwood.  Over 50% of my time on the unit was spent counseling the patient and/or coordinating care regarding services listed in this note.    Sharon Rosado MD  Division of Infectious Diseases and International Medicine  [phone]         History of Infectious Disease Illness:   Mr. Lockwood is a 50 year old man who presents with routine follow up.  We have had difficulty getting Andrew in because he has had several weeks of incarceration. It is not clear what precipitated this, but it sounds like it was immigration status related.  We have regularly been in touch with his  on this issue.  He is also about to be evicted from his home although it is not clear if [facility] will be able to make him leave in the winter.  Andrew has been working on getting money from someone in [place] or someone from his Buddhism.  Today he  reports that he is interesting in transitioning to a woman and he would like both hormonal therapy and gender reassignment surgery.  He reports that he thinks that he no longer has DM.         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 They did not live together.  Historical use of ARVs: Triumeq, Genvoya  Historical Resistance Mutations: Susceptible        Past Medical and Surgical History:     Past Medical History:   Diagnosis Date     AIDS (H)      Allergic rhinitis due to other allergen     DNS     Chronic abdominal pain      CNS toxoplasmosis (H)      Diabetes type 2, controlled (H)      GERD (gastroesophageal reflux disease)      HIV (human immunodeficiency virus infection) (H)      Periungual wart      Sleep apnea     doesn't use CPAP       Past Surgical History:   Procedure Laterality Date     C NONSPECIFIC PROCEDURE      right forearm fracture     COLONOSCOPY Left 1/22/2016    Procedure: COMBINED COLONOSCOPY, SINGLE OR MULTIPLE BIOPSY/POLYPECTOMY BY BIOPSY;  Surgeon: Clark Saini MD;  Location: [facility]     HC EXPLORE UNDESC TESTIS,INGUIN/SCROTAL       LAPAROSCOPY DIAGNOSTIC (GENERAL) N/A 7/26/2016    Procedure: LAPAROSCOPY DIAGNOSTIC (GENERAL);  Surgeon: Susannah Arriaga MD;  [facility]     OPTICAL TRACKING SYSTEM CRANIOTOMY, EXCISE TUMOR, COMBINED Left 4/10/2015    Procedure: COMBINED OPTICAL TRACKING SYSTEM CRANIOTOMY, EXCISE TUMOR;  Surgeon: Mirlande Colmenares MD;  [facility]     REPAIR GAMEKEEPER'S THUMB Right 12/2/2016    Procedure: REPAIR LIGAMENT ULNAR COLLATERAL THUMB (GAMEKEEPER'S);  Surgeon: Lona  Evin Baker MD;  Location:  OR           Family History:     Family History   Problem Relation Age of Onset     DIABETES Brother      DIABETES Father      Alzheimer Disease Father      Unknown/Adopted Mother      DIABETES Paternal Grandfather      CANCER No family hx of      no skin cancer     Skin Cancer No family hx of      no famiy hx of skin cancer           Social History:     Social History   Substance Use Topics     Smoking status: Current Every Day Smoker     Packs/day: 0.20     Last attempt to quit: 10/28/2016     Smokeless tobacco: Former User      Comment: 4 cigarettes     Alcohol use No      Comment: Last etoh in 2007     Social History     Social History Narrative    Born in [facility].  Came to the [place] in 1993.  Last traveled to visit family in 2008.                 Review of Systems:   CONSTITUTIONAL: negative fevers or chills   GASTROINTESTINAL: negative for nausea, vomiting, diarrhea  INTEGUMENT: negative for new rash  NEURO: Negative for headache         Current Medications:     Current Outpatient Prescriptions   Medication Sig Dispense Refill     benzonatate (TESSALON) 100 MG capsule Take 1 capsule (100 mg) by mouth 3 times daily as needed for cough 30 capsule 0     pseudoePHEDrine (SUDAFED) 30 MG tablet Take 2 tablets (60 mg) by mouth every 6 hours as needed for congestion 112 tablet 0     metFORMIN (GLUCOPHAGE) 500 MG tablet TAKE TWO TABLETS BY MOUTH TWICE A DAY WITH MEALS 120 tablet 3     azithromycin (ZITHROMAX) 600 MG tablet TAKE 2 TABLETS (1200 MG) BY MOUTH ONCE EVERY 7 DAYS (Patient taking differently: TAKE 1 TABLET (600 mg) BY MOUTH ONCE EVERY 7 DAYS) 8 tablet 11     sulfamethoxazole-trimethoprim (BACTRIM DS/SEPTRA DS) 800-160 MG per tablet TAKE 1 TABLET BY MOUTH 2 TIMES DAILY (Patient taking differently: 4 tablets 2 times daily TAKE 1 TABLET BY MOUTH 2 TIMES DAILY) 60 tablet 5     gabapentin (NEURONTIN) 300 MG capsule Take 1 capsule (300 mg) by mouth 3 times daily 90 capsule 5  "    GENVOYA [phone] mg tablet Take 1 tablet by mouth daily 30 tablet 5     omeprazole (PRILOSEC) 20 MG CR capsule TAKE ONE CAPSULE BY MOUTH EVERY DAY 60 capsule 3     blood glucose monitoring (NO BRAND SPECIFIED) meter device kit Use to test blood sugar four times daily or as directed. 1 kit 0     blood glucose monitoring (NO BRAND SPECIFIED) test strip Use to test blood sugars four times daily or as directed 100 each 5     blood glucose (NO BRAND SPECIFIED) lancets standard Use to test blood sugar four times daily or as directed. 100 Box 5            Immunization History:     Immunization History   Administered Date(s) Administered     HepB 03/10/2016, 06/16/2016     HepB-Adult 08/31/2017     Influenza (IIV3) PF 10/17/2016     Influenza Vaccine IM 3yrs+ 4 Valent IIV4 12/22/2015, 12/14/2017     Mantoux Tuberculin Skin Test 04/12/2015     Meningococcal (Menactra ) 08/31/2015, 03/10/2016     Pneumo Conj 13-V (2010&after) 03/10/2016     Pneumococcal 23 valent 06/16/2016     TDAP Vaccine (Boostrix) 10/17/2016            Allergies:     Allergies   Allergen Reactions     Milk [Lac Bovis] Hives     Tylenol [Acetaminophen] Itching            Physical Exam:   /88  Pulse 101  Temp 97.7  F (36.5  C) (Oral)  Ht 1.626 m (5' 4\")  Wt 74.5 kg (164 lb 3.2 oz)  BMI 28.18 kg/m2    Physical Examination:         Physical Exam:   /88  Pulse 101  Temp 97.7  F (36.5  C) (Oral)  Ht 1.626 m (5' 4\")  Wt 74.5 kg (164 lb 3.2 oz)  BMI 28.18 kg/m2  Physical Examination:  GENERAL:  well-developed, well-nourished, seated in no acute distress.  HEENT:  Head is normocephalic, atraumatic   EYES:  Eyes have anicteric sclerae without conjunctival injection   SKIN:  No acute rashes. .   NEUROLOGIC:  Grossly nonfocal. Active x4 extremities          Laboratory Data:     CD4 Count  Absolute CD4   Date Value Ref Range Status   12/12/2017 225 (L) 441 - 2156 cells/uL Final     CD4 [place] T   Date Value Ref Range Status   12/12/2017 " 10 (L) 28 - 63 % Final     CD4:CD8 Ratio   Date Value Ref Range Status   12/12/2017 0.29 (L) 1.40 - 2.60 Final       HIV-1 RNA Quantitative:  HIV-1 RNA Quant Result   Date Value Ref Range Status   12/12/2017 52 (A) HIVND^HIV-1 RNA Not Detected [Copies]/mL Final     Comment:     The DAMON AmpliPrep/DAMON TaqMan HIV-1 test is an FDA-approved in vitro   nucleic acid amplification test for the quantitation of HIV-1 RNA in human   plasma (EDTA plasma) using the DAMON AmpliPrep instrument for automated viral   nucleic acid extraction and the DAMON TaqMan Analyzer or DAMON TaqMan for   automated Real Time PCR amplification and detection of the viral nucleic acid   target.  Titer results are reported in copies/ml. This assay is intended for use in   conjunction with clinical presentation and other laboratory markers of disease   prognosis and for use as an aid in assessing viral response to antiretroviral   treatment as measured by changes in plasma HIV-1 RNA levels. This test should   not be used as a donor screening test to confirm the presence of HIV-1   infection.         Metabolic Studies       Sodium   Date Value Ref Range Status   12/12/2017 135 133 - 144 mmol/L Final   11/07/2017 138 133 - 144 mmol/L Final     Potassium   Date Value Ref Range Status   12/12/2017 3.4 3.4 - 5.3 mmol/L Final   11/07/2017 3.2 (L) 3.4 - 5.3 mmol/L Final     Chloride   Date Value Ref Range Status   12/12/2017 103 94 - 109 mmol/L Final   11/07/2017 104 94 - 109 mmol/L Final     Carbon Dioxide   Date Value Ref Range Status   12/12/2017 23 20 - 32 mmol/L Final   11/07/2017 26 20 - 32 mmol/L Final     Anion Gap   Date Value Ref Range Status   12/12/2017 9 3 - 14 mmol/L Final   11/07/2017 8 3 - 14 mmol/L Final     Urea Nitrogen   Date Value Ref Range Status   12/12/2017 14 7 - 30 mg/dL Final   11/07/2017 12 7 - 30 mg/dL Final     Creatinine   Date Value Ref Range Status   12/12/2017 0.88 0.66 - 1.25 mg/dL Final   11/07/2017 0.56 (L) 0.66 -  1.25 mg/dL Final     GFR Estimate   Date Value Ref Range Status   12/12/2017 >90 >60 mL/min/1.7m2 Final     Comment:     Non  GFR Calc   11/07/2017 >90 >60 mL/min/1.7m2 Final     Comment:     Non  GFR Calc     Glucose   Date Value Ref Range Status   12/12/2017 163 (H) 70 - 99 mg/dL Final   11/07/2017 123 (H) 70 - 99 mg/dL Final     Hemoglobin A1C   Date Value Ref Range Status   08/11/2017 6.0 4.3 - 6.0 % Final     Calcium   Date Value Ref Range Status   12/12/2017 8.6 8.5 - 10.1 mg/dL Final   11/07/2017 8.8 8.5 - 10.1 mg/dL Final     Phosphorus   Date Value Ref Range Status   11/07/2017 2.6 2.5 - 4.5 mg/dL Final   12/26/2016 2.9 2.5 - 4.5 mg/dL Final     Magnesium   Date Value Ref Range Status   11/07/2017 2.0 1.6 - 2.3 mg/dL Final   12/28/2016 2.0 1.6 - 2.3 mg/dL Final     Lactic Acid   Date Value Ref Range Status   11/07/2017 2.0 0.7 - 2.0 mmol/L Final   11/07/2017 2.2 (H) 0.7 - 2.1 mmol/L Final       Hepatic Studies    Bilirubin Total   Date Value Ref Range Status   12/12/2017 0.6 0.2 - 1.3 mg/dL Final   11/06/2017 0.5 0.2 - 1.3 mg/dL Final     Alkaline Phosphatase   Date Value Ref Range Status   12/12/2017 103 40 - 150 U/L Final   11/06/2017 146 40 - 150 U/L Final     Albumin   Date Value Ref Range Status   12/12/2017 3.7 3.4 - 5.0 g/dL Final   11/06/2017 3.8 3.4 - 5.0 g/dL Final     AST   Date Value Ref Range Status   12/12/2017 24 0 - 45 U/L Final   11/06/2017 52 (H) 0 - 45 U/L Final     ALT   Date Value Ref Range Status   12/12/2017 30 0 - 70 U/L Final   11/06/2017 51 0 - 70 U/L Final       Hematology Studies      WBC   Date Value Ref Range Status   12/12/2017 7.2 4.0 - 11.0 10e9/L Final   11/07/2017 8.3 4.0 - 11.0 10e9/L Final     Absolute Neutrophil   Date Value Ref Range Status   12/12/2017 3.9 1.6 - 8.3 10e9/L Final   11/07/2017 5.5 1.6 - 8.3 10e9/L Final     Absolute Lymphocytes   Date Value Ref Range Status   12/12/2017 2.4 0.8 - 5.3 10e9/L Final   11/07/2017 2.0 0.8 -  5.3 10e9/L Final     Absolute Monocytes   Date Value Ref Range Status   12/12/2017 0.6 0.0 - 1.3 10e9/L Final   11/07/2017 0.6 0.0 - 1.3 10e9/L Final     Absolute Eosinophils   Date Value Ref Range Status   12/12/2017 0.3 0.0 - 0.7 10e9/L Final   11/07/2017 0.1 0.0 - 0.7 10e9/L Final     Hemoglobin   Date Value Ref Range Status   12/12/2017 13.7 13.3 - 17.7 g/dL Final   11/07/2017 14.1 13.3 - 17.7 g/dL Final     Hematocrit   Date Value Ref Range Status   12/12/2017 40.4 40.0 - 53.0 % Final   11/07/2017 40.7 40.0 - 53.0 % Final     Platelet Count   Date Value Ref Range Status   12/12/2017 222 150 - 450 10e9/L Final   11/07/2017 163 150 - 450 10e9/L Final       Hepatitis B Testing     Recent Labs   Lab Test  04/13/15   0816   HBCAB  Nonreactive   HEPBANG  Nonreactive   HBCM  Nonreactive   A nonreactive result suggests lack of recent exposure to the virus in the   preceding 6 months.     HBEABY  Negative  Reference range: Negative  (Note)  Performed by [facility],  [address] [place] [id number] [phone]  [email or web address], Nayan Corley MD, Lab. Director     HBEAGN  Negative  Reference range: Negative  (Note)  Performed by [facility],  [address] [place] [address] [phone]  [email or web address], Nayan Corley MD, Lab. Director         Hepatitis C Testing     Hepatitis C Antibody   Date Value Ref Range Status   03/02/2017  NR Final    Nonreactive   Assay performance characteristics have not been established for newborns,   infants, and children     04/13/2015  NR Final    Nonreactive   Assay performance characteristics have not been established for newborns,   infants, and children       Imaging:  No results found for this or any previous visit (from the past 744 hour(s)).

## 2017-12-29 PROCEDURE — 99284 EMERGENCY DEPT VISIT MOD MDM: CPT | Mod: Z6 | Performed by: EMERGENCY MEDICINE

## 2017-12-29 PROCEDURE — 99284 EMERGENCY DEPT VISIT MOD MDM: CPT | Mod: 25 | Performed by: EMERGENCY MEDICINE

## 2017-12-29 PROCEDURE — 96374 THER/PROPH/DIAG INJ IV PUSH: CPT | Performed by: EMERGENCY MEDICINE

## 2017-12-30 ENCOUNTER — HOSPITAL ENCOUNTER (EMERGENCY)
Facility: CLINIC | Age: 52
Discharge: HOME | End: 2017-12-30
Attending: EMERGENCY MEDICINE | Admitting: EMERGENCY MEDICINE
Payer: COMMERCIAL

## 2017-12-30 VITALS
TEMPERATURE: 98.5 F | DIASTOLIC BLOOD PRESSURE: 80 MMHG | WEIGHT: 170.42 LBS | HEART RATE: 72 BPM | SYSTOLIC BLOOD PRESSURE: 111 MMHG | OXYGEN SATURATION: 99 % | BODY MASS INDEX: 29.25 KG/M2 | RESPIRATION RATE: 16 BRPM

## 2017-12-30 DIAGNOSIS — K21.9 GASTROESOPHAGEAL REFLUX DISEASE WITHOUT ESOPHAGITIS: ICD-10-CM

## 2017-12-30 DIAGNOSIS — F41.1 ANXIETY REACTION: ICD-10-CM

## 2017-12-30 LAB
ALBUMIN SERPL-MCNC: 3.4 G/DL (ref 3.4–5)
ALP SERPL-CCNC: 125 U/L (ref 40–150)
ALT SERPL W P-5'-P-CCNC: 22 U/L (ref 0–70)
ANION GAP SERPL CALCULATED.3IONS-SCNC: 8 MMOL/L (ref 3–14)
AST SERPL W P-5'-P-CCNC: 14 U/L (ref 0–45)
BASOPHILS # BLD AUTO: 0 10E9/L (ref 0–0.2)
BASOPHILS NFR BLD AUTO: 0.7 %
BILIRUB SERPL-MCNC: 0.2 MG/DL (ref 0.2–1.3)
BUN SERPL-MCNC: 14 MG/DL (ref 7–30)
CALCIUM SERPL-MCNC: 9 MG/DL (ref 8.5–10.1)
CHLORIDE SERPL-SCNC: 107 MMOL/L (ref 94–109)
CO2 SERPL-SCNC: 24 MMOL/L (ref 20–32)
CREAT SERPL-MCNC: 0.81 MG/DL (ref 0.66–1.25)
DIFFERENTIAL METHOD BLD: ABNORMAL
EOSINOPHIL # BLD AUTO: 0.2 10E9/L (ref 0–0.7)
EOSINOPHIL NFR BLD AUTO: 2.7 %
ERYTHROCYTE [DISTWIDTH] IN BLOOD BY AUTOMATED COUNT: 13.3 % (ref 10–15)
GFR SERPL CREATININE-BSD FRML MDRD: >90 ML/MIN/1.7M2
GLUCOSE SERPL-MCNC: 163 MG/DL (ref 70–99)
HCT VFR BLD AUTO: 39.2 % (ref 40–53)
HGB BLD-MCNC: 13.1 G/DL (ref 13.3–17.7)
IMM GRANULOCYTES # BLD: 0 10E9/L (ref 0–0.4)
IMM GRANULOCYTES NFR BLD: 0.7 %
LIPASE SERPL-CCNC: 258 U/L (ref 73–393)
LYMPHOCYTES # BLD AUTO: 2.3 10E9/L (ref 0.8–5.3)
LYMPHOCYTES NFR BLD AUTO: 39.6 %
MCH RBC QN AUTO: 32 PG (ref 26.5–33)
MCHC RBC AUTO-ENTMCNC: 33.4 G/DL (ref 31.5–36.5)
MCV RBC AUTO: 96 FL (ref 78–100)
MONOCYTES # BLD AUTO: 0.4 10E9/L (ref 0–1.3)
MONOCYTES NFR BLD AUTO: 7.3 %
NEUTROPHILS # BLD AUTO: 2.9 10E9/L (ref 1.6–8.3)
NEUTROPHILS NFR BLD AUTO: 49 %
NRBC # BLD AUTO: 0 10*3/UL
NRBC BLD AUTO-RTO: 0 /100
PLATELET # BLD AUTO: 267 10E9/L (ref 150–450)
POTASSIUM SERPL-SCNC: 3.6 MMOL/L (ref 3.4–5.3)
PROT SERPL-MCNC: 7 G/DL (ref 6.8–8.8)
RBC # BLD AUTO: 4.09 10E12/L (ref 4.4–5.9)
SODIUM SERPL-SCNC: 139 MMOL/L (ref 133–144)
WBC # BLD AUTO: 5.9 10E9/L (ref 4–11)

## 2017-12-30 PROCEDURE — 85025 COMPLETE CBC W/AUTO DIFF WBC: CPT | Performed by: EMERGENCY MEDICINE

## 2017-12-30 PROCEDURE — 80053 COMPREHEN METABOLIC PANEL: CPT | Performed by: EMERGENCY MEDICINE

## 2017-12-30 PROCEDURE — 96374 THER/PROPH/DIAG INJ IV PUSH: CPT | Performed by: EMERGENCY MEDICINE

## 2017-12-30 PROCEDURE — 25000125 ZZHC RX 250: Performed by: EMERGENCY MEDICINE

## 2017-12-30 PROCEDURE — 83690 ASSAY OF LIPASE: CPT | Performed by: EMERGENCY MEDICINE

## 2017-12-30 PROCEDURE — 25000132 ZZH RX MED GY IP 250 OP 250 PS 637: Performed by: EMERGENCY MEDICINE

## 2017-12-30 RX ADMIN — LIDOCAINE HYDROCHLORIDE 30 ML: 20 SOLUTION ORAL; TOPICAL at 01:47

## 2017-12-30 RX ADMIN — FAMOTIDINE 20 MG: 10 INJECTION, SOLUTION INTRAVENOUS at 01:41

## 2017-12-30 ASSESSMENT — ENCOUNTER SYMPTOMS
BLOOD IN STOOL: 0
ABDOMINAL PAIN: 1
SHORTNESS OF BREATH: 0
NAUSEA: 1
APPETITE CHANGE: 0
FEVER: 0
DIARRHEA: 0
VOMITING: 0
CONSTIPATION: 0

## 2017-12-30 NOTE — ED AVS SNAPSHOT
[facility], Emergency Department    [address] [facility] [address]    Phone:  [phone]                                       Andrew Lockwood   MRN: [id number]    Department:  [facility], Emergency Department   Date of Visit:  12/29/2017           Patient Information     Date Of Birth          11/27/1967        Your diagnoses for this visit were:     Gastroesophageal reflux disease without esophagitis     Anxiety reaction        You were seen by Debbi Odom MD.        Discharge Instructions       Thank you for coming to the [facility] Emergency Department.     Please continue your medications as usual.     Follow up with your primary care and ID doctors with any ongoing concerns.     24 Hour Appointment Hotline       To make an appointment at any [facility] clinic, call [phone] ([phone]). If you don't have a family doctor or clinic, we will help you find one. [facility] clinics are conveniently located to serve the needs of you and your family.             Review of your medicines      Our records show that you are taking the medicines listed below. If these are incorrect, please call your family doctor or clinic.        Dose / Directions Last dose taken    azithromycin 600 MG tablet   Commonly known as:  ZITHROMAX   Quantity:  8 tablet        TAKE 2 TABLETS (1200 MG) BY MOUTH ONCE EVERY 7 DAYS   Refills:  11        benzonatate 100 MG capsule   Commonly known as:  TESSALON   Dose:  100 mg   Quantity:  30 capsule        Take 1 capsule (100 mg) by mouth 3 times daily as needed for cough   Refills:  0        blood glucose lancets standard   Commonly known as:  no brand specified   Quantity:  100 Box        Use to test blood sugar four times daily or as directed.   Refills:  5        blood glucose monitoring meter device kit   Commonly known as:  no brand specified   Quantity:  1 kit        Use to test blood sugar four times daily or as directed.   Refills:   0        blood glucose monitoring test strip   Commonly known as:  no brand specified   Quantity:  100 each        Use to test blood sugars four times daily or as directed   Refills:  5        Lzpjylp-Opfir-Svkvczvo-TenofAF [phone] MG Tabs per tablet   Commonly known as:  GENVOYA   Dose:  1 tablet   Quantity:  30 tablet        Take 1 tablet by mouth daily   Refills:  5        gabapentin 300 MG capsule   Commonly known as:  NEURONTIN   Dose:  300 mg   Quantity:  90 capsule        Take 1 capsule (300 mg) by mouth 3 times daily   Refills:  5        metFORMIN 500 MG tablet   Commonly known as:  GLUCOPHAGE   Quantity:  120 tablet        TAKE TWO TABLETS BY MOUTH TWICE A DAY WITH MEALS   Refills:  3        omeprazole 20 MG CR capsule   Commonly known as:  priLOSEC   Quantity:  60 capsule        TAKE ONE CAPSULE BY MOUTH EVERY DAY   Refills:  3        pseudoePHEDrine 30 MG tablet   Commonly known as:  SUDAFED   Dose:  60 mg   Quantity:  112 tablet        Take 2 tablets (60 mg) by mouth every 6 hours as needed for congestion   Refills:  0        sulfamethoxazole-trimethoprim 800-160 MG per tablet   Commonly known as:  BACTRIM DS/SEPTRA DS   Quantity:  60 tablet        TAKE 1 TABLET BY MOUTH 2 TIMES DAILY   Refills:  5                Procedures and tests performed during your visit     CBC with platelets differential    Comprehensive metabolic panel    Lipase    Peripheral IV catheter      Orders Needing Specimen Collection     None      Pending Results     No orders found from 12/28/2017 to 12/31/2017.            Pending Culture Results     No orders found from 12/28/2017 to 12/31/2017.            Pending Results Instructions     If you had any lab results that were not finalized at the time of your Discharge, you can call the ED Lab Result RN at [phone]. You will be contacted by this team for any positive Lab results or changes in treatment. The nurses are available 7 days a week from 10A to 6:30P.  You can  leave a message 24 hours per day and they will return your call.        Thank you for choosing [facility]       Thank you for choosing [facility] for your care. Our goal is always to provide you with excellent care. Hearing back from our patients is one way we can continue to improve our services. Please take a few minutes to complete the written survey that you may receive in the mail after you visit with us. Thank you!        [facility] Information     [facility] gives you secure access to your electronic health record. If you see a primary care provider, you can also send messages to your care team and make appointments. If you have questions, please call your primary care clinic.  If you do not have a primary care provider, please call [phone] and they will assist you.        Care EveryWhere ID     This is your Care EveryWhere ID. This could be used by other organizations to access your [facility] medical records  [id number]        Equal Access to Services     RA JORDAN : Apollo Cody, maverick centeno, dez jackson. So [facility] [phone].    ATENCIÓN: Si habla español, tiene a kohli disposición servicios gratuitos de asistencia lingüística. Llame al [phone].    We comply with applicable federal civil rights laws and [place] laws. We do not discriminate on the basis of race, color, national origin, age, disability, sex, sexual orientation, or gender identity.            After Visit Summary       This is your record. Keep this with you and show to your community pharmacist(s) and doctor(s) at your next visit.

## 2017-12-30 NOTE — ED AVS SNAPSHOT
[facility], [place], Emergency Department    [address] [place] [address]    Phone:  [phone]                                       Andrew Lockwood   MRN: [id number]    Department:  [facility], Emergency Department   Date of Visit:  12/29/2017           After Visit Summary Signature Page     I have received my discharge instructions, and my questions have been answered. I have discussed any challenges I see with this plan with the nurse or doctor.    ..........................................................................................................................................  Patient/Patient Representative Signature      ..........................................................................................................................................  Patient Representative Print Name and Relationship to Patient    ..................................................               ................................................  Date                                            Time    ..........................................................................................................................................  Reviewed by Signature/Title    ...................................................              ..............................................  Date                                                            Time

## 2017-12-30 NOTE — ED PROVIDER NOTES
History     Chief Complaint   Patient presents with     Abdominal Pain     Gastrophageal Reflux     HPI  Andrew Lockwood is a 50 year old male with history of HIV/AIDS, CNS toxoplasmosis, chronic abdominal pain, type II DM, GERD and recurrent SBO who presents with abdominal cramping and acid reflux.  Patient reports that he has had persistent abdominal cramping and acid reflux over the past week.  Complains of difficulty sleeping as he feels the acid is coming up into his throat and nose.  He notes that he has not eaten in the past day.  He denies appetite change or change in bowel movements.  He complains of nausea, denies vomiting.  Denies fever or shortness of breath.  He reports that he is followed by Dr. Rosado.  He is a current smoker, denies alcohol or drug use.  Patient reports that he was in the hospital about 1 month ago for similar symptoms.  At that time, he was treated in the ED with Dilaudid 0.5 mg IV ×1, 1 L of LR, as well as GI cocktail ×1; during admission was treated with Zofran as needed for nausea and Protonix 40 mg IV twice daily.  Patient was recommended to undergo outpatient EGD at that time.  Patient reports that he is currently on azithromycin and Bactrim.  He is also currently on Prilosec.    Past Medical History:   Diagnosis Date     AIDS (H)      Allergic rhinitis due to other allergen     DNS     Chronic abdominal pain      CNS toxoplasmosis (H)      Diabetes type 2, controlled (H)      GERD (gastroesophageal reflux disease)      HIV (human immunodeficiency virus infection) (H)      Periungual wart      Sleep apnea     doesn't use CPAP       Past Surgical History:   Procedure Laterality Date     C NONSPECIFIC PROCEDURE      right forearm fracture     COLONOSCOPY Left 1/22/2016    Procedure: COMBINED COLONOSCOPY, SINGLE OR MULTIPLE BIOPSY/POLYPECTOMY BY BIOPSY;  Surgeon: Clark Saini MD;  Location: [facility]     HC EXPLORE UNDESC TESTIS,INGUIN/SCROTAL       LAPAROSCOPY DIAGNOSTIC  (GENERAL) N/A 7/26/2016    Procedure: LAPAROSCOPY DIAGNOSTIC (GENERAL);  Surgeon: Susannah Arriaga MD;  Location: [facility] OR     OPTICAL TRACKING SYSTEM CRANIOTOMY, EXCISE TUMOR, COMBINED Left 4/10/2015    Procedure: COMBINED OPTICAL TRACKING SYSTEM CRANIOTOMY, EXCISE TUMOR;  Surgeon: Mirlande Colmenares MD;  Location: [facility] OR     REPAIR GAMEKEEPER'S THUMB Right 12/2/2016    Procedure: REPAIR LIGAMENT ULNAR COLLATERAL THUMB (GAMEKEEPER'S);  Surgeon: Evin Zamorano MD;  Location: [facility]       Family History   Problem Relation Age of Onset     DIABETES Brother      DIABETES Father      Alzheimer Disease Father      Unknown/Adopted Mother      DIABETES Paternal Grandfather      CANCER No family hx of      no skin cancer     Skin Cancer No family hx of      no famiy hx of skin cancer       Social History   Substance Use Topics     Smoking status: Current Every Day Smoker     Packs/day: 0.20     Last attempt to quit: 10/28/2016     Smokeless tobacco: Former User      Comment: 4 cigarettes     Alcohol use No      Comment: Last etoh in 2007     No current facility-administered medications for this encounter.      Current Outpatient Prescriptions   Medication     benzonatate (TESSALON) 100 MG capsule     pseudoePHEDrine (SUDAFED) 30 MG tablet     metFORMIN (GLUCOPHAGE) 500 MG tablet     azithromycin (ZITHROMAX) 600 MG tablet     sulfamethoxazole-trimethoprim (BACTRIM DS/SEPTRA DS) 800-160 MG per tablet     gabapentin (NEURONTIN) 300 MG capsule     GENVOYA [id number] mg tablet     omeprazole (PRILOSEC) 20 MG CR capsule     blood glucose monitoring (NO BRAND SPECIFIED) meter device kit     blood glucose monitoring (NO BRAND SPECIFIED) test strip     blood glucose (NO BRAND SPECIFIED) lancets standard        Allergies   Allergen Reactions     Milk [Lac Bovis] Hives     Tylenol [Acetaminophen] Itching      I have reviewed the Medications, Allergies, Past Medical and Surgical History, and Social History in the Epic  system.    Review of Systems   Constitutional: Negative for appetite change and fever.   Respiratory: Negative for shortness of breath.    Gastrointestinal: Positive for abdominal pain (cramping) and nausea. Negative for blood in stool, constipation, diarrhea and vomiting.   All other systems reviewed and are negative.      Physical Exam   BP: 120/70  Pulse: 101  Temp: 98.5  F (36.9  C)  Weight: 77.3 kg (170 lb 6.7 oz)  SpO2: 97 %      Physical Exam  Gen:A&Ox3, no acute distress, anxious but calming  HEENT:PERRL, no facial tenderness or wounds, head atraumatic, oropharynx clear, mucous membranes moist, TMs clear bilaterally  Back: no CVA tenderness  CV:RRR without murmurs  PULM:Clear to auscultation bilaterally  Abd:soft, minimal supraumbilical tenderness, negative Mendes's sign, no RLQ tenderness.   UE:No traumatic injuries, skin normal  LE:no traumatic injuries, skin normal, no LE edema  Neuro:CN II-XII intact, strength 5/5 throughout  Skin: no rashes or ecchymoses     ED Course     ED Course     Procedures     1:14 AM  The patient was seen and examined by Dr. Odom in Room 19.               Critical Care time:  none             Labs Ordered and Resulted from Time of ED Arrival Up to the Time of Departure from the ED   CBC WITH PLATELETS DIFFERENTIAL - Abnormal; Notable for the following:        Result Value    RBC Count 4.09 (*)     Hemoglobin 13.1 (*)     Hematocrit 39.2 (*)     All other components within normal limits   COMPREHENSIVE METABOLIC PANEL - Abnormal; Notable for the following:     Glucose 163 (*)     All other components within normal limits   LIPASE   PERIPHERAL IV CATHETER            Assessments & Plan (with Medical Decision Making)   51 yo M presenting with abdominal pain and acid reflux. Reports acute onset of symptoms this evening, presenting similar to a panic attack.   Now calm. No chest pain or shortness of breath.   Abdominal exam reassuring.   No symptoms of GI bleeding.   CBC, CMP notable  for Hgb of 13, glucose of 160, lipase normal at 258.  Treated with GI cocktail and a dose of pepcid.       3:53 AM  Pt reassessed. Sleeping. Easily arousable.   Reports feeling better after treatment.   Lab testing reviewed.   Has to take bus home. Will discharge him this AM.     I have reviewed the nursing notes.    I have reviewed the findings, diagnosis, plan and need for follow up with the patient.    Current Discharge Medication List          Final diagnoses:   Gastroesophageal reflux disease without esophagitis   Anxiety reaction     IChucky, am serving as a trained medical scribe to document services personally performed by Debbi Odom MD, based on the provider's statements to me.   IDebbi MD, was physically present and have reviewed and verified the accuracy of this note documented by Chucky Villatoro.   12/29/2017   [facility], [place], EMERGENCY DEPARTMENT    MD LEV Weber Katrina Anne, MD  12/30/17 0621

## 2017-12-30 NOTE — ED NOTES
Pt arrived for stomach cramps and reflux. He has been feeling this way for a few days. He is alert, oriented, ambulatory.

## 2017-12-30 NOTE — DISCHARGE INSTRUCTIONS
Thank you for coming to the [facility] Emergency Department.     Please continue your medications as usual.     Follow up with your primary care and ID doctors with any ongoing concerns.

## 2018-01-01 ENCOUNTER — HOSPITAL ENCOUNTER (EMERGENCY)
Facility: CLINIC | Age: 53
Discharge: HOME | End: 2018-01-01
Attending: EMERGENCY MEDICINE | Admitting: EMERGENCY MEDICINE
Payer: COMMERCIAL

## 2018-01-01 VITALS
HEART RATE: 80 BPM | HEIGHT: 64 IN | SYSTOLIC BLOOD PRESSURE: 128 MMHG | OXYGEN SATURATION: 99 % | RESPIRATION RATE: 18 BRPM | TEMPERATURE: 97.3 F | DIASTOLIC BLOOD PRESSURE: 89 MMHG | WEIGHT: 170 LBS | BODY MASS INDEX: 29.02 KG/M2

## 2018-01-01 DIAGNOSIS — R10.84 ABDOMINAL PAIN, GENERALIZED: ICD-10-CM

## 2018-01-01 LAB
ALBUMIN SERPL-MCNC: 3.6 G/DL (ref 3.4–5)
ALP SERPL-CCNC: 127 U/L (ref 40–150)
ALT SERPL W P-5'-P-CCNC: 26 U/L (ref 0–70)
ANION GAP SERPL CALCULATED.3IONS-SCNC: 9 MMOL/L (ref 3–14)
APTT PPP: 32 SEC (ref 22–37)
AST SERPL W P-5'-P-CCNC: 11 U/L (ref 0–45)
BASOPHILS # BLD AUTO: 0 10E9/L (ref 0–0.2)
BASOPHILS NFR BLD AUTO: 0.3 %
BILIRUB SERPL-MCNC: 0.2 MG/DL (ref 0.2–1.3)
BUN SERPL-MCNC: 14 MG/DL (ref 7–30)
CALCIUM SERPL-MCNC: 9.1 MG/DL (ref 8.5–10.1)
CHLORIDE SERPL-SCNC: 107 MMOL/L (ref 94–109)
CO2 SERPL-SCNC: 23 MMOL/L (ref 20–32)
CREAT SERPL-MCNC: 0.72 MG/DL (ref 0.66–1.25)
DIFFERENTIAL METHOD BLD: NORMAL
EOSINOPHIL # BLD AUTO: 0.3 10E9/L (ref 0–0.7)
EOSINOPHIL NFR BLD AUTO: 2.7 %
ERYTHROCYTE [DISTWIDTH] IN BLOOD BY AUTOMATED COUNT: 12.9 % (ref 10–15)
GFR SERPL CREATININE-BSD FRML MDRD: >90 ML/MIN/1.7M2
GLUCOSE SERPL-MCNC: 121 MG/DL (ref 70–99)
HCT VFR BLD AUTO: 41.4 % (ref 40–53)
HGB BLD-MCNC: 13.9 G/DL (ref 13.3–17.7)
IMM GRANULOCYTES # BLD: 0.1 10E9/L (ref 0–0.4)
IMM GRANULOCYTES NFR BLD: 0.9 %
INR PPP: 0.98 (ref 0.86–1.14)
LACTATE BLD-SCNC: 1.3 MMOL/L (ref 0.7–2)
LYMPHOCYTES # BLD AUTO: 3.2 10E9/L (ref 0.8–5.3)
LYMPHOCYTES NFR BLD AUTO: 33.9 %
MCH RBC QN AUTO: 31.4 PG (ref 26.5–33)
MCHC RBC AUTO-ENTMCNC: 33.6 G/DL (ref 31.5–36.5)
MCV RBC AUTO: 94 FL (ref 78–100)
MONOCYTES # BLD AUTO: 0.6 10E9/L (ref 0–1.3)
MONOCYTES NFR BLD AUTO: 6.8 %
NEUTROPHILS # BLD AUTO: 5.2 10E9/L (ref 1.6–8.3)
NEUTROPHILS NFR BLD AUTO: 55.4 %
NRBC # BLD AUTO: 0 10*3/UL
NRBC BLD AUTO-RTO: 0 /100
PLATELET # BLD AUTO: 317 10E9/L (ref 150–450)
POTASSIUM SERPL-SCNC: 3.6 MMOL/L (ref 3.4–5.3)
PROT SERPL-MCNC: 7.6 G/DL (ref 6.8–8.8)
RBC # BLD AUTO: 4.43 10E12/L (ref 4.4–5.9)
SODIUM SERPL-SCNC: 139 MMOL/L (ref 133–144)
WBC # BLD AUTO: 9.3 10E9/L (ref 4–11)

## 2018-01-01 PROCEDURE — 25000132 ZZH RX MED GY IP 250 OP 250 PS 637: Performed by: EMERGENCY MEDICINE

## 2018-01-01 PROCEDURE — 85610 PROTHROMBIN TIME: CPT | Performed by: EMERGENCY MEDICINE

## 2018-01-01 PROCEDURE — 85025 COMPLETE CBC W/AUTO DIFF WBC: CPT | Performed by: EMERGENCY MEDICINE

## 2018-01-01 PROCEDURE — 25000125 ZZHC RX 250: Performed by: EMERGENCY MEDICINE

## 2018-01-01 PROCEDURE — 85730 THROMBOPLASTIN TIME PARTIAL: CPT | Performed by: EMERGENCY MEDICINE

## 2018-01-01 PROCEDURE — 99284 EMERGENCY DEPT VISIT MOD MDM: CPT | Performed by: EMERGENCY MEDICINE

## 2018-01-01 PROCEDURE — 80053 COMPREHEN METABOLIC PANEL: CPT | Performed by: EMERGENCY MEDICINE

## 2018-01-01 PROCEDURE — 83605 ASSAY OF LACTIC ACID: CPT | Performed by: EMERGENCY MEDICINE

## 2018-01-01 PROCEDURE — 99283 EMERGENCY DEPT VISIT LOW MDM: CPT | Mod: Z6 | Performed by: EMERGENCY MEDICINE

## 2018-01-01 RX ADMIN — LIDOCAINE HYDROCHLORIDE 30 ML: 20 SOLUTION ORAL; TOPICAL at 04:53

## 2018-01-01 ASSESSMENT — ENCOUNTER SYMPTOMS
APPETITE CHANGE: 0
FEVER: 0
DIARRHEA: 0
NAUSEA: 0
BLOOD IN STOOL: 1
UNEXPECTED WEIGHT CHANGE: 0
VOMITING: 0
CONSTIPATION: 0
ABDOMINAL DISTENTION: 0
HEADACHES: 0
DYSURIA: 0
CHILLS: 0

## 2018-01-01 NOTE — ED PROVIDER NOTES
"  History     Chief Complaint   Patient presents with     Abdominal Pain     Rectal Bleeding     HPI  50-year-old male with history of HIV/AIDS, CNS toxo plasma ptosis, chronic abdominal pain, recurrent SBO with abdominal cramping and acid reflux.  The patient returns today to the emergency department for evaluation of persistent abdominal cramping.  Patient states he feels this is consistent with chronic cramping however tonight noted bright red blood when wiping.  He states he has a history of GI bleed but does not recall what this is from.  He states he underwent endoscopy and colonoscopy without obvious source.  He denies obvious cause such as AVM, polyp, hemorrhoid, anal fissure.  He reports no history of upper GI bleed such as ulcer or variceal bleeding.  He denies associated symptoms such as lightheadedness, shortness of breath, or chest pain.  He reports no use of anticoagulation.  He reports cramping is consistent with baseline cramping.  He otherwise denies new medications.  He reports no associated diarrhea, fever, nausea, or abdominal pain associated with bright red blood upon wiping.  He states he has been set up numerous times with a gastroenterologist but has not made follow-up appointment due to many things going on in his life.    I have reviewed the Medications, Allergies, Past Medical and Surgical History, and Social History in the Epic system.    Review of Systems   Constitutional: Negative for appetite change, chills, fever and unexpected weight change.   Gastrointestinal: Positive for blood in stool. Negative for abdominal distention, constipation, diarrhea, nausea and vomiting.   Genitourinary: Negative for dysuria.   Neurological: Negative for headaches.   All other systems reviewed and are negative.      Physical Exam   BP: 121/81  Pulse: 82  Heart Rate: 82  Temp: 97.3  F (36.3  C)  Resp: 18  Height: 162.6 cm (5' 4\")  Weight: 77.1 kg (170 lb)  SpO2: 100 %      Physical Exam   Constitutional: " He is oriented to person, place, and time. He appears well-developed and well-nourished. No distress.   HENT:   Head: Normocephalic and atraumatic.   Mouth/Throat: Oropharynx is clear and moist.   Eyes: Conjunctivae are normal. Pupils are equal, round, and reactive to light.   Cardiovascular: Normal rate and normal heart sounds.    Pulmonary/Chest: Effort normal and breath sounds normal. No respiratory distress. He has no wheezes. He has no rales.   Abdominal: Soft. He exhibits no distension and no mass. There is no tenderness. There is no rebound and no guarding.   Genitourinary: Rectal exam shows no external hemorrhoid, no fissure, anal tone normal and guaiac negative stool.   Musculoskeletal: He exhibits no edema.   Neurological: He is alert and oriented to person, place, and time. No cranial nerve deficit.   Skin: Skin is warm and dry. No rash noted. He is not diaphoretic.   Psychiatric: He has a normal mood and affect.       ED Course     ED Course     Procedures             Labs Ordered and Resulted from Time of ED Arrival Up to the Time of Departure from the ED   CBC WITH PLATELETS DIFFERENTIAL   COMPREHENSIVE METABOLIC PANEL   INR   PARTIAL THROMBOPLASTIN TIME   LACTIC ACID WHOLE BLOOD   UA MACROSCOPIC WITH REFLEX TO MICRO AND CULTURE            Assessments & Plan (with Medical Decision Making)     50-year-old male with history of HIV/AIDS arriving to the emergency department with a concern of bright red blood upon wiping.  On my evaluation is noted to be alert, he is afebrile, and hemodynamically stable.  The patient appears well and is seated upright in bed texting on his phone.  Clinically he has no sign of significant hemorrhage.  Abdominal examination is benign with no distention involuntary guarding mass or appreciable hernia.  Rectal examination demonstrates no hemorrhoids or fissure.  He has no pain or rectal mass.  FOBT negative.  He did repeat laboratory studies from yesterday that shows overall  increase in hemoglobin.  He has no coagulopathy by lab studies.  At this time I do not believe he requires imaging such as CT scan.  Based on my evaluation laboratory studies and clinical history provided by the patient I do not believe he benefit from emergent colonoscopy.  However with history of HIV he would benefit from outpatient follow-up with gastroenterology for consideration of repeat colonoscopy.  I did discuss the patient indications to return such as development of fever, increasing abdominal pain, generalized weakness, recurrence of bright red blood per rectum, weakness, or other concern.  Otherwise he will follow-up with his primary care physician and gastroenterologist on an as-needed basis.    I have reviewed the nursing notes.    I have reviewed the findings, diagnosis, plan and need for follow up with the patient.    New Prescriptions    No medications on file       Final diagnoses:   Abdominal pain, generalized       1/1/2018   [facility], [place], EMERGENCY DEPARTMENT     Kofi Esparza MD  01/01/18 0638

## 2018-01-01 NOTE — ED AVS SNAPSHOT
[facility], Emergency Department    [address] [address]    Phone:  [phone]                                       Andrew Lockwood   MRN: [id number]    Department:  [facility], Emergency Department   Date of Visit:  1/1/2018           Patient Information     Date Of Birth          11/27/1967        Your diagnoses for this visit were:     Abdominal pain, generalized        You were seen by Kofi Esparza MD.      Follow-up Information     Follow up with Shanta Munoz.    Contact information:    [address] [address]  [phone]          Discharge Instructions         Abdominal Pain    Abdominal pain is pain in the stomach or belly area. Everyone has this pain from time to time. In many cases it goes away on its own. But abdominal pain can sometimes be due to a serious problem, such as appendicitis. So it s important to know when to seek help.  Causes of abdominal pain  There are many possible causes of abdominal pain. Common causes in adults include:    Constipation, diarrhea, or gas    Stomach acid flowing back up into the esophagus (acid reflux or heartburn)    Severe acid reflux, called GERD (gastroesophageal reflux disease)    A sore in the lining of the stomach or small intestine (peptic ulcer)    Inflammation of the gallbladder, liver, or pancreas    Gallstones or kidney stones    Appendicitis     Intestinal blockage     An internal organ pushing through a muscle or other tissue (hernia)    Urinary tract infections    In women, menstrual cramps, fibroids, or endometriosis    Inflammation or infection of the intestines  Diagnosing the cause of abdominal pain  Your healthcare provider will do a physical exam help find the cause of your pain. If needed, tests will be ordered. Belly pain has many possible causes. So it can be hard to find the reason for your pain. Giving details about your pain can help. Tell your provider where and when  you feel the pain, and what makes it better or worse. Also let your provider know if you have other symptoms such as:    Fever    Tiredness    Upset stomach (nausea)    Vomiting    Changes in bathroom habits  Treating abdominal pain  Some causes of pain need emergency medical treatment right away. These include appendicitis or a bowel blockage. Other problems can be treated with rest, fluids, or medicines. Your healthcare provider can give you specific instructions for treatment or self-care based on what is causing your pain.  If you have vomiting or diarrhea, sip water or other clear fluids. When you are ready to eat solid foods again, start with small amounts of easy-to-digest, low-fat foods. These include apple sauce, toast, or crackers.   When to seek medical care  Call 911 or go to the hospital right away if you:    Can t pass stool and are vomiting    Are vomiting blood or have bloody diarrhea or black, tarry diarrhea    Have chest, neck, or shoulder pain    Feel like you might pass out    Have pain in your shoulder blades with nausea    Have sudden, severe belly pain    Have new, severe pain unlike any you have felt before    Have a belly that is rigid, hard, and tender to touch  Call your healthcare provider if you have:    Pain for more than 5 days    Bloating for more than 2 days    Diarrhea for more than 5 days    A fever of 100.4 F (38.0 C) or higher, or as directed by your provider    Pain that gets worse    Weight loss for no reason    Continued lack of appetite    Blood in your stool  How to prevent abdominal pain  Here are some tips to help prevent abdominal pain:    Eat smaller amounts of food at one time.    Avoid greasy, fried, or other high-fat foods.    Avoid foods that give you gas.    Exercise regularly.    Drink plenty of fluids.  To help prevent GERD symptoms:    Quit smoking.    Reduce alcohol and certain foods that increase stomach acid.    Avoid aspirin and over-the-counter pain and  fever medicines (NSAIDS or nonsteroidal anti-inflammatory drugs), if possible    Lose extra weight.    Finish eating at least 2 hours before you go to bed or lie down.    Raise the head of your bed.  Date Last Reviewed: 7/1/2016 2000-2017 [employer]. [address] [place] [id number]. All rights reserved. This information is not intended as a substitute for professional medical care. Always follow your healthcare professional's instructions.          24 Hour Appointment Hotline       To make an appointment at any [facility], call [phone] ([phone]). If you don't have a family doctor or clinic, we will help you find one. [facility] clinics are conveniently located to serve the needs of you and your family.          ED Discharge Orders     GASTROENTEROLOGY ADULT REF CONSULT ONLY       Preferred Location: [facility] [phone]      Please be aware that coverage of these services is subject to the terms and limitations of your health insurance plan.  Call member services at your health plan with any benefit or coverage questions.  Any procedures must be performed at a [facility] facility OR coordinated by your clinic's referral office.    Please bring the following with you to your appointment:    (1) Any X-Rays, CTs or MRIs which have been performed.  Contact the facility where they were done to arrange for  prior to your scheduled appointment.    (2) List of current medications   (3) This referral request   (4) Any documents/labs given to you for this referral                     Review of your medicines      Our records show that you are taking the medicines listed below. If these are incorrect, please call your family doctor or clinic.        Dose / Directions Last dose taken    azithromycin 600 MG tablet   Commonly known as:  ZITHROMAX   Quantity:  8 tablet        TAKE 2 TABLETS (1200 MG) BY MOUTH ONCE EVERY 7 DAYS   Refills:  11        benzonatate 100 MG  capsule   Commonly known as:  TESSALON   Dose:  100 mg   Quantity:  30 capsule        Take 1 capsule (100 mg) by mouth 3 times daily as needed for cough   Refills:  0        blood glucose lancets standard   Commonly known as:  no brand specified   Quantity:  100 Box        Use to test blood sugar four times daily or as directed.   Refills:  5        blood glucose monitoring meter device kit   Commonly known as:  no brand specified   Quantity:  1 kit        Use to test blood sugar four times daily or as directed.   Refills:  0        blood glucose monitoring test strip   Commonly known as:  no brand specified   Quantity:  100 each        Use to test blood sugars four times daily or as directed   Refills:  5        Uktcqsm-Pndpo-Unyusivr-TenofAF [id number] MG Tabs per tablet   Commonly known as:  GENVOYA   Dose:  1 tablet   Quantity:  30 tablet        Take 1 tablet by mouth daily   Refills:  5        gabapentin 300 MG capsule   Commonly known as:  NEURONTIN   Dose:  300 mg   Quantity:  90 capsule        Take 1 capsule (300 mg) by mouth 3 times daily   Refills:  5        metFORMIN 500 MG tablet   Commonly known as:  GLUCOPHAGE   Quantity:  120 tablet        TAKE TWO TABLETS BY MOUTH TWICE A DAY WITH MEALS   Refills:  3        omeprazole 20 MG CR capsule   Commonly known as:  priLOSEC   Quantity:  60 capsule        TAKE ONE CAPSULE BY MOUTH EVERY DAY   Refills:  3        pseudoePHEDrine 30 MG tablet   Commonly known as:  SUDAFED   Dose:  60 mg   Quantity:  112 tablet        Take 2 tablets (60 mg) by mouth every 6 hours as needed for congestion   Refills:  0        sulfamethoxazole-trimethoprim 800-160 MG per tablet   Commonly known as:  BACTRIM DS/SEPTRA DS   Quantity:  60 tablet        TAKE 1 TABLET BY MOUTH 2 TIMES DAILY   Refills:  5                Procedures and tests performed during your visit     CBC with platelets differential    Comprehensive metabolic panel    INR    Lactic acid whole blood    Partial  thromboplastin time      Orders Needing Specimen Collection     Ordered          01/01/18 0402  UA reflex to Microscopic and Culture - STAT, Prio: STAT, Needs to be Collected     Scheduled Task Status   01/01/18 0403 Collect UA reflex to Microscopic and Culture Open   Order Class:  PCU Collect                  Pending Results     No orders found from 12/30/2017 to 1/2/2018.            Pending Culture Results     No orders found from 12/30/2017 to 1/2/2018.            Pending Results Instructions     If you had any lab results that were not finalized at the time of your Discharge, you can call the ED Lab Result RN at [phone]. You will be contacted by this team for any positive Lab results or changes in treatment. The nurses are available 7 days a week from 10A to 6:30P.  You can leave a message 24 hours per day and they will return your call.        Thank you for choosing [facility]       Thank you for choosing [facility] for your care. Our goal is always to provide you with excellent care. Hearing back from our patients is one way we can continue to improve our services. Please take a few minutes to complete the written survey that you may receive in the mail after you visit with us. Thank you!        [employer] Information     [employer] gives you secure access to your electronic health record. If you see a primary care provider, you can also send messages to your care team and make appointments. If you have questions, please call your primary care clinic.  If you do not have a primary care provider, please call [phone] and they will assist you.        Care EveryWhere ID     This is your Care EveryWhere ID. This could be used by other organizations to access your [facility] medical records  [id number]        Equal Access to Services     RA JORDAN : Apollo Cody, maverick centeno, dez jackson. So [facility] [phone].    ATENCIÓN: Valentina watts  español, tiene a kohli disposición servicios gratuitos de asistencia lingüística. Gurdeep al [phone].    We comply with applicable federal civil rights laws and [place] laws. We do not discriminate on the basis of race, color, national origin, age, disability, sex, sexual orientation, or gender identity.            After Visit Summary       This is your record. Keep this with you and show to your community pharmacist(s) and doctor(s) at your next visit.

## 2018-01-01 NOTE — ED NOTES
Pt ambulated into room, c/o upper abdominal pain that radiates to left upper abdomen with rectal bleeding that hasnt stopped hurting since he was d/c from the hospital. Pt denies any nausea/vomiting.

## 2018-01-01 NOTE — DISCHARGE INSTRUCTIONS

## 2018-01-01 NOTE — ED AVS SNAPSHOT
[facility], [place], Emergency Department    [address] [place] [address]    Phone:  [phone]                                       Andrew Lockwood   MRN: [id number]    Department:  [facility], Emergency Department   Date of Visit:  1/1/2018           After Visit Summary Signature Page     I have received my discharge instructions, and my questions have been answered. I have discussed any challenges I see with this plan with the nurse or doctor.    ..........................................................................................................................................  Patient/Patient Representative Signature      ..........................................................................................................................................  Patient Representative Print Name and Relationship to Patient    ..................................................               ................................................  Date                                            Time    ..........................................................................................................................................  Reviewed by Signature/Title    ...................................................              ..............................................  Date                                                            Time

## 2018-01-03 ENCOUNTER — HOSPITAL ENCOUNTER (EMERGENCY)
Facility: CLINIC | Age: 53
Discharge: HOME | End: 2018-01-03
Attending: EMERGENCY MEDICINE | Admitting: EMERGENCY MEDICINE
Payer: COMMERCIAL

## 2018-01-03 VITALS
SYSTOLIC BLOOD PRESSURE: 115 MMHG | WEIGHT: 170 LBS | TEMPERATURE: 95.7 F | HEART RATE: 74 BPM | BODY MASS INDEX: 29.02 KG/M2 | DIASTOLIC BLOOD PRESSURE: 82 MMHG | HEIGHT: 64 IN | OXYGEN SATURATION: 96 % | RESPIRATION RATE: 16 BRPM

## 2018-01-03 DIAGNOSIS — R10.84 ABDOMINAL PAIN, GENERALIZED: ICD-10-CM

## 2018-01-03 LAB
ANION GAP SERPL CALCULATED.3IONS-SCNC: 11 MMOL/L (ref 3–14)
BASOPHILS # BLD AUTO: 0 10E9/L (ref 0–0.2)
BASOPHILS NFR BLD AUTO: 0.2 %
BUN SERPL-MCNC: 21 MG/DL (ref 7–30)
CALCIUM SERPL-MCNC: 9.1 MG/DL (ref 8.5–10.1)
CHLORIDE SERPL-SCNC: 105 MMOL/L (ref 94–109)
CO2 SERPL-SCNC: 22 MMOL/L (ref 20–32)
CREAT SERPL-MCNC: 0.94 MG/DL (ref 0.66–1.25)
DIFFERENTIAL METHOD BLD: ABNORMAL
EOSINOPHIL # BLD AUTO: 0.2 10E9/L (ref 0–0.7)
EOSINOPHIL NFR BLD AUTO: 2.6 %
ERYTHROCYTE [DISTWIDTH] IN BLOOD BY AUTOMATED COUNT: 13.2 % (ref 10–15)
GFR SERPL CREATININE-BSD FRML MDRD: 84 ML/MIN/1.7M2
GLUCOSE SERPL-MCNC: 120 MG/DL (ref 70–99)
HCT VFR BLD AUTO: 40 % (ref 40–53)
HGB BLD-MCNC: 13.4 G/DL (ref 13.3–17.7)
IMM GRANULOCYTES # BLD: 0.1 10E9/L (ref 0–0.4)
IMM GRANULOCYTES NFR BLD: 0.9 %
LYMPHOCYTES # BLD AUTO: 3.2 10E9/L (ref 0.8–5.3)
LYMPHOCYTES NFR BLD AUTO: 34.7 %
MCH RBC QN AUTO: 31.6 PG (ref 26.5–33)
MCHC RBC AUTO-ENTMCNC: 33.5 G/DL (ref 31.5–36.5)
MCV RBC AUTO: 94 FL (ref 78–100)
MONOCYTES # BLD AUTO: 0.6 10E9/L (ref 0–1.3)
MONOCYTES NFR BLD AUTO: 6.9 %
NEUTROPHILS # BLD AUTO: 5.1 10E9/L (ref 1.6–8.3)
NEUTROPHILS NFR BLD AUTO: 54.7 %
NRBC # BLD AUTO: 0 10*3/UL
NRBC BLD AUTO-RTO: 0 /100
PLATELET # BLD AUTO: 311 10E9/L (ref 150–450)
POTASSIUM SERPL-SCNC: 3.8 MMOL/L (ref 3.4–5.3)
RBC # BLD AUTO: 4.24 10E12/L (ref 4.4–5.9)
SODIUM SERPL-SCNC: 138 MMOL/L (ref 133–144)
WBC # BLD AUTO: 9.3 10E9/L (ref 4–11)

## 2018-01-03 PROCEDURE — 85025 COMPLETE CBC W/AUTO DIFF WBC: CPT | Performed by: EMERGENCY MEDICINE

## 2018-01-03 PROCEDURE — 99283 EMERGENCY DEPT VISIT LOW MDM: CPT

## 2018-01-03 PROCEDURE — 25000125 ZZHC RX 250: Performed by: EMERGENCY MEDICINE

## 2018-01-03 PROCEDURE — 25000132 ZZH RX MED GY IP 250 OP 250 PS 637: Performed by: EMERGENCY MEDICINE

## 2018-01-03 PROCEDURE — 36415 COLL VENOUS BLD VENIPUNCTURE: CPT

## 2018-01-03 PROCEDURE — 99284 EMERGENCY DEPT VISIT MOD MDM: CPT | Mod: Z6 | Performed by: EMERGENCY MEDICINE

## 2018-01-03 PROCEDURE — 80048 BASIC METABOLIC PNL TOTAL CA: CPT | Performed by: EMERGENCY MEDICINE

## 2018-01-03 RX ORDER — FAMOTIDINE 10 MG
10 TABLET ORAL ONCE
Status: COMPLETED | OUTPATIENT
Start: 2018-01-03 | End: 2018-01-03

## 2018-01-03 RX ADMIN — LIDOCAINE HYDROCHLORIDE 30 ML: 20 SOLUTION ORAL; TOPICAL at 02:57

## 2018-01-03 RX ADMIN — FAMOTIDINE 10 MG: 10 TABLET, FILM COATED ORAL at 02:57

## 2018-01-03 ASSESSMENT — ENCOUNTER SYMPTOMS
RHINORRHEA: 0
DIZZINESS: 0
APPETITE CHANGE: 0
HEADACHES: 0
ABDOMINAL PAIN: 1
FLANK PAIN: 0
WEAKNESS: 0
CHEST TIGHTNESS: 0
COUGH: 0
FEVER: 0
CONSTIPATION: 0
NECK PAIN: 0
SORE THROAT: 0
BLOOD IN STOOL: 1
ARTHRALGIAS: 0
RECTAL PAIN: 0
CHILLS: 0
SHORTNESS OF BREATH: 0
MYALGIAS: 0
FATIGUE: 0
VOMITING: 0
LIGHT-HEADEDNESS: 0
BRUISES/BLEEDS EASILY: 0
DIARRHEA: 0
PALPITATIONS: 0
HEMATURIA: 0
DIAPHORESIS: 0
NAUSEA: 0
CONSTITUTIONAL NEGATIVE: 1

## 2018-01-03 NOTE — DISCHARGE INSTRUCTIONS
Follow up this week with your primary physician and gastroenterologist.  Return if persistent symptoms.

## 2018-01-03 NOTE — ED AVS SNAPSHOT
[facility], [place], Emergency Department    [address] [place] [address]    Phone:  [phone]                                       Andrew Lockwood   MRN: [id number]    Department:  [facility], Emergency Department   Date of Visit:  1/3/2018           After Visit Summary Signature Page     I have received my discharge instructions, and my questions have been answered. I have discussed any challenges I see with this plan with the nurse or doctor.    ..........................................................................................................................................  Patient/Patient Representative Signature      ..........................................................................................................................................  Patient Representative Print Name and Relationship to Patient    ..................................................               ................................................  Date                                            Time    ..........................................................................................................................................  Reviewed by Signature/Title    ...................................................              ..............................................  Date                                                            Time

## 2018-01-03 NOTE — ED AVS SNAPSHOT
[facility], Emergency Department    [address] [facility] [address]    Phone:  [phone]                                       Andrew Lockwood   MRN: [id number]    Department:  [facility], Emergency Department   Date of Visit:  1/3/2018           Patient Information     Date Of Birth          11/27/1967        Your diagnoses for this visit were:     Abdominal pain, generalized        You were seen by Finesse Ferguson MD.      Follow-up Information     Follow up with Shanta Munoz.    Contact information:    [address] [address]  [phone]          Discharge Instructions       Follow up this week with your primary physician and gastroenterologist.  Return if persistent symptoms.      Future Appointments        Provider Department Dept Phone Center    1/12/2018 8:00 AM VIKASH Hidalgo [facility] Colon and Rectal Surgery [phone] [facility]      24 Hour Appointment Hotline       To make an appointment at any [facility], call [phone] ([phone]). If you don't have a family doctor or clinic, we will help you find one. [facility] are conveniently located to serve the needs of you and your family.             Review of your medicines      Our records show that you are taking the medicines listed below. If these are incorrect, please call your family doctor or clinic.        Dose / Directions Last dose taken    azithromycin 600 MG tablet   Commonly known as:  ZITHROMAX   Quantity:  8 tablet        TAKE 2 TABLETS (1200 MG) BY MOUTH ONCE EVERY 7 DAYS   Refills:  11        benzonatate 100 MG capsule   Commonly known as:  TESSALON   Dose:  100 mg   Quantity:  30 capsule        Take 1 capsule (100 mg) by mouth 3 times daily as needed for cough   Refills:  0        blood glucose lancets standard   Commonly known as:  no brand specified   Quantity:  100 Box        Use to test blood sugar four times daily or as directed.   Refills:   5        blood glucose monitoring meter device kit   Commonly known as:  no brand specified   Quantity:  1 kit        Use to test blood sugar four times daily or as directed.   Refills:  0        blood glucose monitoring test strip   Commonly known as:  no brand specified   Quantity:  100 each        Use to test blood sugars four times daily or as directed   Refills:  5        Evdzjtw-Vnhfy-Vwhymstm-TenofAF 301-691-208-10 MG Tabs per tablet   Commonly known as:  GENVOYA   Dose:  1 tablet   Quantity:  30 tablet        Take 1 tablet by mouth daily   Refills:  5        gabapentin 300 MG capsule   Commonly known as:  NEURONTIN   Dose:  300 mg   Quantity:  90 capsule        Take 1 capsule (300 mg) by mouth 3 times daily   Refills:  5        metFORMIN 500 MG tablet   Commonly known as:  GLUCOPHAGE   Quantity:  120 tablet        TAKE TWO TABLETS BY MOUTH TWICE A DAY WITH MEALS   Refills:  3        omeprazole 20 MG CR capsule   Commonly known as:  priLOSEC   Quantity:  60 capsule        TAKE ONE CAPSULE BY MOUTH EVERY DAY   Refills:  3        pseudoePHEDrine 30 MG tablet   Commonly known as:  SUDAFED   Dose:  60 mg   Quantity:  112 tablet        Take 2 tablets (60 mg) by mouth every 6 hours as needed for congestion   Refills:  0        sulfamethoxazole-trimethoprim 800-160 MG per tablet   Commonly known as:  BACTRIM DS/SEPTRA DS   Quantity:  60 tablet        TAKE 1 TABLET BY MOUTH 2 TIMES DAILY   Refills:  5                Procedures and tests performed during your visit     Basic metabolic panel    CBC with platelets differential    Orthostatic blood pressure and pulse      Orders Needing Specimen Collection     None      Pending Results     No orders found from 1/1/2018 to 1/4/2018.            Pending Culture Results     No orders found from 1/1/2018 to 1/4/2018.            Pending Results Instructions     If you had any lab results that were not finalized at the time of your Discharge, you can call the ED Lab Result RN  at [phone]. You will be contacted by this team for any positive Lab results or changes in treatment. The nurses are available 7 days a week from 10A to 6:30P.  You can leave a message 24 hours per day and they will return your call.        Thank you for choosing [facility]       Thank you for choosing [facility] for your care. Our goal is always to provide you with excellent care. Hearing back from our patients is one way we can continue to improve our services. Please take a few minutes to complete the written survey that you may receive in the mail after you visit with us. Thank you!        [employer] Information     [employer] gives you secure access to your electronic health record. If you see a primary care provider, you can also send messages to your care team and make appointments. If you have questions, please call your primary care clinic.  If you do not have a primary care provider, please call [phone] and they will assist you.        Care EveryWhere ID     This is your Care EveryWhere ID. This could be used by other organizations to access your [facility] medical records  [id number]        Equal Access to Services     RA JORDAN : Hadmagnolia Cody, warebecca centeno, qaiker reyna, dez moreno . So [facility] [phone].    ATENCIÓN: Si habla español, tiene a kohli disposición servicios gratuitos de asistencia lingüística. Llame al [phone].    We comply with applicable federal civil rights laws and [place] laws. We do not discriminate on the basis of race, color, national origin, age, disability, sex, sexual orientation, or gender identity.            After Visit Summary       This is your record. Keep this with you and show to your community pharmacist(s) and doctor(s) at your next visit.

## 2018-01-03 NOTE — ED NOTES
Patient reports he is due for an anal pap smear on 1/12 and he saw his provider recently. The provider told him if he continues to have rectal bleeding, he is to come into the ED.

## 2018-01-03 NOTE — TELEPHONE ENCOUNTER
APPT INFO    Date /Time: 1/12/18   Reason for Appt: Hemorrhoids/Rectal Pap   Ref Provider/Clinic: Dr Rosado, [place]   Are there internal records? Yes/No?  IF YES, list clinic names: Yes  See Above  [facility]   Are there outside records? Yes/No? No   Patient Contact (Y/N) & Call Details: No referred   Action: Reviewed records; Records are in EPIC     OUTSIDE RECORDS CHECKLIST     CLINIC NAME COMMENTS REC (x) IMG (x)

## 2018-01-03 NOTE — ED PROVIDER NOTES
History     Chief Complaint   Patient presents with     Abdominal Pain     HPI  Andrew Lockwood is a 50 year old male who presents reporting noting bright red blood per rectum. States he is scheduled to see a gastroenterologist. He was seen here 2 days ago with same concern. He states his abdominal pain is chronic and is not changed from baseline. No bleeding from other sites. No melena. No nausea or vomiting. No syncope or near syncope. No fever or chills. Denies history of bleeding disorder. No diarrhea. No new or unusual features to his chronic abdominal pain.     I have reviewed the Medications, Allergies, Past Medical and Surgical History, and Social History in the [facility] system.  Past Medical History:   Diagnosis Date     AIDS (H)      Allergic rhinitis due to other allergen     DNS     Chronic abdominal pain      CNS toxoplasmosis (H)      Diabetes type 2, controlled (H)      GERD (gastroesophageal reflux disease)      HIV (human immunodeficiency virus infection) (H)      Periungual wart      Sleep apnea     doesn't use CPAP       Review of Systems   Constitutional: Negative.  Negative for appetite change, chills, diaphoresis, fatigue and fever.   HENT: Negative for congestion, nosebleeds, rhinorrhea and sore throat.    Eyes: Negative for visual disturbance.   Respiratory: Negative for cough, chest tightness and shortness of breath.    Cardiovascular: Negative for chest pain, palpitations and leg swelling.   Gastrointestinal: Positive for abdominal pain and blood in stool. Negative for constipation, diarrhea, nausea, rectal pain and vomiting.   Genitourinary: Negative for flank pain and hematuria.   Musculoskeletal: Negative for arthralgias, gait problem, myalgias and neck pain.   Skin: Negative for rash.   Neurological: Negative for dizziness, syncope, weakness, light-headedness and headaches.   Hematological: Does not bruise/bleed easily.       Physical Exam   BP: (!) 134/94  Pulse: 74  Temp: 95.7  F (35.4  " C)  Resp: 16  Height: 162.6 cm (5' 4\")  Weight: 77.1 kg (170 lb)  SpO2: 96 %  Lying Orthostatic BP: 115/82  Lying Orthostatic Pulse: 84 bpm  Sitting Orthostatic BP: 124/85  Sitting Orthostatic Pulse: 93 bpm  Standing Orthostatic BP: 109/90  Standing Orthostatic Pulse: 97 bpm      Physical Exam   Constitutional: He appears well-developed and well-nourished. No distress.   HENT:   Head: Normocephalic and atraumatic.   Mouth/Throat: Oropharynx is clear and moist.   Eyes: Conjunctivae are normal.   Neck: Normal range of motion. Neck supple.   Abdominal: Soft. Normal appearance and bowel sounds are normal. He exhibits no distension and no mass. There is generalized tenderness. There is no rigidity, no rebound, no guarding and no CVA tenderness.   Genitourinary: Rectal exam shows guaiac negative stool.   Genitourinary Comments: Anal skin tag. No fissure or hemorrhoids or other lesions. Stool normal color and is guaiac negative.   Musculoskeletal: Normal range of motion. He exhibits no edema or tenderness.   Neurological: He is alert.   Skin: Skin is warm and dry.   Psychiatric: He has a normal mood and affect. His behavior is normal.   Nursing note and vitals reviewed.      ED Course     ED Course     Procedures             Critical Care time:  none             Labs Ordered and Resulted from Time of ED Arrival Up to the Time of Departure from the ED   CBC WITH PLATELETS DIFFERENTIAL - Abnormal; Notable for the following:        Result Value    RBC Count 4.24 (*)     All other components within normal limits   BASIC METABOLIC PANEL - Abnormal; Notable for the following:     Glucose 120 (*)     All other components within normal limits   ORTHOSTATIC BLOOD PRESSURE AND PULSE            Assessments & Plan (with Medical Decision Making)   Stool is normal color and is guaiac negative. Hemoglobin is not changed from previous. He is hemodynamically stable. There is no evidence of acute bleeding at this time. No evidence of acute " surgical abdomen. He has scheduled gastroenterology follow up for further evaluation. He is also instructed to follow up this week with his primary physician and to return if persistent, new, or worsening symptoms.    I have reviewed the nursing notes.    I have reviewed the findings, diagnosis, plan and need for follow up with the patient.    New Prescriptions    No medications on file       Final diagnoses:   Abdominal pain, generalized       1/3/2018   [facility], [place], EMERGENCY DEPARTMENT     Finesse Ferguson MD  01/03/18 0426

## 2018-01-11 ENCOUNTER — TELEPHONE (OUTPATIENT)
Dept: SURGERY | Facility: CLINIC | Age: 53
End: 2018-01-11

## 2018-01-11 NOTE — TELEPHONE ENCOUNTER
DIEGO for patient, reminding him of appointment on 1/12/18 @ 8am with Carrie Mckeon NP. Left office number for pt to call back if he needs to reschedule or have any questions.   Feliz STEWART LPN

## 2018-01-12 ENCOUNTER — PRE VISIT (OUTPATIENT)
Dept: SURGERY | Facility: CLINIC | Age: 53
End: 2018-01-12

## 2018-01-17 ENCOUNTER — OFFICE VISIT (OUTPATIENT)
Dept: ORTHOPEDICS | Facility: CLINIC | Age: 53
End: 2018-01-17
Payer: MEDICAID

## 2018-01-17 VITALS — BODY MASS INDEX: 29.02 KG/M2 | HEIGHT: 64 IN | WEIGHT: 170 LBS

## 2018-01-17 DIAGNOSIS — M17.12 PRIMARY OSTEOARTHRITIS OF LEFT KNEE: Primary | ICD-10-CM

## 2018-01-17 NOTE — PROGRESS NOTES
SPORTS & ORTHOPEDIC WALK-IN VISIT 1/17/2018    Has been diagnosed with left knee osteoarthritis and chronic knee pain. Has had a few injections, last one 8/22 by Dr. Ingram. Lasted about 4 months. Patient states the pain starts about mid medial low leg and travels up through thigh, most pain medial posterior knee. Would like another injection. Patient is homeless and has AIDS.     Reason for visit:     What part of your body is injured / painful?  left knee    What caused the injury /pain? No inciting event     How long ago did your injury occur or pain begin? Problem longstanding, this episode started a week ago    What are your most bothersome symptoms? Pain    How would you characterize your symptom?  constant    What makes your symptoms better? Nothing    What makes your symptoms worse? Standing and Walking    Have you been previously seen for this problem? Yes, Dr. Ingram    Medical History:    Any recent changes to your medical history? No    Any new medication prescribed since last visit? No    Have you had surgery on this body part before? No    Social History:    Handedness: Right    Exercise: 7 days/week    Review of Systems:    Do you have fever, chills, weight loss? No    Do you have any vision problems? No    Do you have any chest pain or edema? No    Do you have any shortness of breath or wheezing?  No    Do you have stomach problems? Yes, appointment in April    Do you have any numbness or focal weakness? Yes, in knee    Do you have diabetes? Yes, type 2    Do you have problems with bleeding or clotting? No    Do you have an rashes or other skin lesions? No

## 2018-01-17 NOTE — LETTER
1/17/2018      RE: Andrew Lockwood  [address] [facility] [address]             SPORTS & ORTHOPEDIC WALK-IN VISIT 1/17/2018    Has been diagnosed with left knee osteoarthritis and chronic knee pain. Has had a few injections, last one 8/22 by Dr. Ingram. Lasted about 4 months. Patient states the pain starts about mid medial low leg and travels up through thigh, most pain medial posterior knee. Would like another injection. Patient is homeless and has AIDS.     Reason for visit:     What part of your body is injured / painful?  left knee    What caused the injury /pain? No inciting event     How long ago did your injury occur or pain begin? Problem longstanding, this episode started a week ago    What are your most bothersome symptoms? Pain    How would you characterize your symptom?  constant    What makes your symptoms better? Nothing    What makes your symptoms worse? Standing and Walking    Have you been previously seen for this problem? Yes, Dr. Ingram    Medical History:    Any recent changes to your medical history? No    Any new medication prescribed since last visit? No    Have you had surgery on this body part before? No    Social History:    Handedness: Right    Exercise: 7 days/week    Review of Systems:    Do you have fever, chills, weight loss? No    Do you have any vision problems? No    Do you have any chest pain or edema? No    Do you have any shortness of breath or wheezing?  No    Do you have stomach problems? Yes, appointment in April    Do you have any numbness or focal weakness? Yes, in knee    Do you have diabetes? Yes, type 2    Do you have problems with bleeding or clotting? No    Do you have an rashes or other skin lesions? No           CHIEF COMPLAINT:  Consult (Left Knee)       HISTORY OF PRESENT ILLNESS  Mr. Lockwood is a pleasant 50 year old year old male who presents to clinic today with acute on chronic left knee pain.  Andrew explains that he has bilateral osteoarthritis diagnosed by  Dr. Ingram.  He has had previous corticosteroid injections of knees which has helped in the past.  Last injection 8/2017 with 5 months of relief.  Right knee continues to experience relief.  Has not completed PT.    Additional history: as documented    MEDICAL HISTORY  Patient Active Problem List   Diagnosis     Allergic rhinitis due to other allergen     Brain lesion     Toxoplasma encephalitis (H)     Pulmonary nodules     Human immunodeficiency virus I infection (H)     Folliculitis     Prurigo nodularis     Epidermal cyst     Shoulder joint pain, unspecified laterality     CNS toxoplasmosis (H)     Constipation     Non-intractable vomiting with nausea, vomiting of unspecified type     Thrush     Insomnia, unspecified insomnia     Bowel obstruction     Toxoplasmosis     Gastroesophageal reflux disease     Headache     Aphthous ulcer     Type 2 diabetes mellitus (H)     Small bowel obstruction     SBO (small bowel obstruction)     Slow transit constipation     Periungual wart     Herpes zoster     Erectile dysfunction, unspecified erectile dysfunction type     Insomnia, unspecified type     Preventative health care     Pain of right thumb     Rupture of ulnar collateral ligament of right thumb     Aftercare following surgery of the musculoskeletal system     Panic disorder     Generalized anxiety disorder     Major depressive disorder, single episode, unspecified     Abdominal pain       Current Outpatient Prescriptions   Medication Sig Dispense Refill     benzonatate (TESSALON) 100 MG capsule Take 1 capsule (100 mg) by mouth 3 times daily as needed for cough 30 capsule 0     pseudoePHEDrine (SUDAFED) 30 MG tablet Take 2 tablets (60 mg) by mouth every 6 hours as needed for congestion 112 tablet 0     metFORMIN (GLUCOPHAGE) 500 MG tablet TAKE TWO TABLETS BY MOUTH TWICE A DAY WITH MEALS 120 tablet 3     azithromycin (ZITHROMAX) 600 MG tablet TAKE 2 TABLETS (1200 MG) BY MOUTH ONCE EVERY 7 DAYS (Patient taking  differently: TAKE 1 TABLET (600 mg) BY MOUTH ONCE EVERY 7 DAYS) 8 tablet 11     sulfamethoxazole-trimethoprim (BACTRIM DS/SEPTRA DS) 800-160 MG per tablet TAKE 1 TABLET BY MOUTH 2 TIMES DAILY (Patient taking differently: 4 tablets 2 times daily TAKE 1 TABLET BY MOUTH 2 TIMES DAILY) 60 tablet 5     gabapentin (NEURONTIN) 300 MG capsule Take 1 capsule (300 mg) by mouth 3 times daily 90 capsule 5     GENVOYA [phone] mg tablet Take 1 tablet by mouth daily 30 tablet 5     omeprazole (PRILOSEC) 20 MG CR capsule TAKE ONE CAPSULE BY MOUTH EVERY DAY 60 capsule 3     blood glucose monitoring (NO BRAND SPECIFIED) meter device kit Use to test blood sugar four times daily or as directed. 1 kit 0     blood glucose monitoring (NO BRAND SPECIFIED) test strip Use to test blood sugars four times daily or as directed 100 each 5     blood glucose (NO BRAND SPECIFIED) lancets standard Use to test blood sugar four times daily or as directed. 100 Box 5       Allergies   Allergen Reactions     Milk [Lac Bovis] Hives     Tylenol [Acetaminophen] Itching       Family History   Problem Relation Age of Onset     DIABETES Brother      DIABETES Father      Alzheimer Disease Father      Unknown/Adopted Mother      DIABETES Paternal Grandfather      CANCER No family hx of      no skin cancer     Skin Cancer No family hx of      no famiy hx of skin cancer       Additional medical/Social/Surgical histories reviewed in [facility] and updated as appropriate.     REVIEW OF SYSTEMS (1/18/2018)  CONSTITUTIONAL: Denies fever and weight loss  EYES: Denies acute vision changes  ENT: Denies hearing changes or difficulty swallowing  CARDIAC: Denies chest pain or edema  RESPIRATORY: Denies dyspnea, cough or wheeze  GASTROINTESTINAL: Denies abdominal pain, nausea, vomiting  MUSCULOSKELETAL: See HPI  SKIN: Denies any recent rash or lesion  NEUROLOGICAL: Denies numbness or focal weakness  PSYCHIATRIC: No history of psychiatric symptoms or problems  ENDOCRINE:  "Denies current diagnosis of diabetes   Lab Results   Component Value Date    A1C 6.0 08/11/2017    A1C 6.2 05/23/2017    A1C 6.4 01/23/2017    A1C 7.0 08/18/2016    A1C 8.3 06/16/2016     HEMATOLOGY: Denies episodes of easy bleeding      PHYSICAL EXAM  Ht 1.626 m (5' 4\")  Wt 77.1 kg (170 lb)  BMI 29.18 kg/m2      General Appearance: Well appearing, alert, in no acute distress, well-hydrated, and well nourished  Cardiovascular: no signs of upper or lower extremity edema  Respiratory: no respiratory distress, no audible wheezing, no labored breathing, symmetric thoracic excursion  Psychiatric: mood and affect are appropriate, patient is oriented to time, place and person  General  - normal appearance, in no obvious distress  CV  - normal popliteal pulse  Pulm  - normal respiratory pattern, non-labored  Musculoskeletal - knee  - stance: Normal gait without limp, no leg length discrepancy  - inspection: Trace effusion, no ecchymosis.  Normal muscle tone. Normal bone and joint alignment.   - palpation: medial joint line tenderness, patella facet tenderness laterally, and patellar tendon non-tender, normal popliteal pulse  - ROM: 135degrees flexion, -5 degrees extension, painful with terminal extension.  - strength: 5/5 in flexion, 5/5 in extension  - neuro: no sensory or motor deficit  - special tests:  (-) Lachman  (-) anterior drawer  (-) posterior drawer  (-) Raoul  (-) varus at 0 and 30 degrees flexion  (-) valgus at 0 and 30 degrees flexion  (-) Richie s compression test  (-) patellar apprehension  Neuro  - no sensory or motor deficit, grossly normal coordination, normal muscle tone  Skin  - no ecchymosis, erythema, warmth, or induration, no obvious rash  Psych  - interactive, appropriate, normal mood and affect    ASSESSMENT & PLAN  Mr. Lockwood is a 50 year old year old male PMH HIV undetectable VL, well controlled DM2 who presents to clinic today with acute exacerbation of left knee osteoarthritis.  Radiographs " reviewed and confirm left knee moderate medial JSN.  We discussed options including injection, PT and other injectable options.      Diagnosis:   Left knee osteoarthritis    -Corticosteroid injection today  -HEP knee provided  -Continue voltaren gel PRN  -Declines formal PT  -Follow up with Dr. Ingram     It was a pleasure seeing Andrew today.    Aguila Cannon DO, [facility]  Primary Care Sports Medicine    PROCEDURE    Left knee Injection - Intraarticular  The patient was informed of the risks and the benefits of the procedure and a written consent was signed.  The patient s left knee was prepped with chlorhexidine in sterile fashion.   40 mg of triamcinolone suspension was drawn up into a 5 mL syringe with 4 mL of 1% lidocaine.  Injection was performed using substerile technique.  A 1.5-inch 25-gauge needle was used to enter the lateral aspect of the left knee.  Injection performed successfully without difficulty.  There were no complications. The patient tolerated the procedure well. There was negligible bleeding.   The patient was instructed to ice the knee upon leaving clinic and refrain from overuse over the next 3 days.   The patient was instructed to call or go to the emergency room with any unusual pain, swelling, redness, or if otherwise concerned.  A follow up appointment will be scheduled to evaluate response to the injection, and to assess range of motion and pain.      Aguila Cannon DO

## 2018-01-17 NOTE — MR AVS SNAPSHOT
After Visit Summary   1/17/2018    Andrew Lockwood    MRN: [id number]           Patient Information     Date Of Birth          11/27/1967        Visit Information        Provider Department      1/17/2018 1:10 PM Aguila Cannon DO [facility] Orthopa[facility]        Care Instructions    Knee Exercises     You may do the first 7 exercises right away. You may do the rest of the exercises when the pain in your knee has decreased.    Passive knee extension: Do this exercise if you are unable to extend your knee fully. While lying on your back, place a rolled-up towel under the heel of your injured leg so the heel is about 6 inches off the ground. Relax your leg muscles and let gravity slowly straighten your knee. Try to hold this position for 2 minutes. Repeat 3 times. You may feel some discomfort while doing this exercise. Do the exercise several times a day.  This exercise can also be done while sitting in a chair with your heel on another chair or stool.    Heel slide: Sit on a firm surface with your legs straight in front of you. Slowly slide the heel of the foot on your injured side toward your buttock by pulling your knee toward your chest as you slide the heel. Return to the starting position. Do 2 sets of 15.  Standing calf stretch: Stand facing a wall with your hands on the wall at about eye level. Keep your injured leg back with your heel on the floor. Keep the other leg forward with the knee bent. Turn your back foot slightly inward (as if you were pigeon-toed). Slowly lean into the wall until you feel a stretch in the back of your calf. Hold the stretch for 15 to 30 seconds. Return to the starting position. Repeat 3 times. Do this exercise several times each day.    Hamstring stretch on wall: Lie on your back with your buttocks close to a doorway. Stretch your uninjured leg straight out in front of you on the floor through the doorway. Raise your injured leg and rest it against  the wall next to the door frame. Keep your leg as straight as possible. You should feel a stretch in the back of your thigh. Hold this position for 15 to 30 seconds. Repeat 3 times.  Straight leg raise: Lie on your back with your legs straight out in front of you. Bend the knee on your uninjured side and place the foot flat on the floor. Tighten the thigh muscle on your injured side and lift your leg about 8 inches off the floor. Keep your leg straight and your thigh muscle tight. Slowly lower your leg back down to the floor. Do 2 sets of 15.    Prone hip extension: Lie on your stomach with your legs straight out behind you. Fold your arms under your head and rest your head on your arms. Draw your belly button in towards your spine and tighten your abdominal muscles. Tighten the buttocks and thigh muscles of the leg on your injured side and lift the leg off the floor about 8 inches. Keep your leg straight. Hold for 5 seconds. Then lower your leg and relax. Do 2 sets of 15.  Clam exercise: Lie on your uninjured side with your hips and knees bent and feet together. Slowly raise your top leg toward the ceiling while keeping your heels touching each other. Hold for 2 seconds and lower slowly. Do 2 sets of 15 repetitions.    Wall squat with a ball: Stand with your back, shoulders, and head against a wall. Look straight ahead. Keep your shoulders relaxed and your feet 3 feet (90 centimeters) from the wall and shoulder's width apart. Place a soccer or basketball-sized ball behind your back. Keeping your back against the wall, slowly squat down to a 45-degree angle. Your thighs will not yet be parallel to the floor. Hold this position for 10 seconds and then slowly slide back up the wall. Repeat 10 times. Build up to 2 sets of 15.  Step-up: Stand with the foot of your injured leg on a support 3 to 5 inches (8 to 13 centimeters) high --like a small step or block of wood. Keep your other foot flat on the floor. Shift your  weight onto the injured leg on the support. Straighten your injured leg as the other leg comes off the floor. Return to the starting position by bending your injured leg and slowly lowering your uninjured leg back to the floor. Do 2 sets of 15.    Knee stabilization: Wrap a piece of elastic tubing around the ankle of your uninjured leg. Tie a knot in the other end of the tubing and close it in a door at about ankle height.  Stand facing the door on the leg without tubing (your injured leg) and bend your knee slightly, keeping your thigh muscles tight. Stay in this position while you move the leg with the tubing (the uninjured leg) straight back behind you. Do 2 sets of 15.  Turn 90 degrees so the leg without tubing is closest to the door. Move the leg with tubing away from your body. Do 2 sets of 15.  Turn 90 degrees again so your back is to the door. Move the leg with tubing straight out in front of you. Do 2 sets of 15.  Turn your body 90 degrees again so the leg with tubing is closest to the door. Move the leg with tubing across your body. Do 2 sets of 15.  Hold onto a chair if you need help balancing. This exercise can be made more challenging by standing on a firm pillow or foam mat while you move the leg with tubing.    Resisted terminal knee extension: Make a loop with a piece of elastic tubing by tying a knot in both ends. Close the knot in a door at knee height. Step into the loop with your injured leg so the tubing is around the back of your knee. Lift the other foot off the ground and hold onto a chair for balance, if needed. Bend the knee with tubing about 45 degrees. Slowly straighten your leg, keeping your thigh muscle tight as you do this. Repeat 15 times. Do 2 sets of 15. If you need an easier way to do this, stand on both legs for better support while you do the exercise.  If you have access to a wobble board, do the following exercises:          Developed by [employer].  Published by  "[employer].  Copyright  2014 [employer] and/or one of its subsidiaries. All rights reserved.            Follow-ups after your visit        Who to contact     Please call your clinic at [phone] to:    Ask questions about your health    Make or cancel appointments    Discuss your medicines    Learn about your test results    Speak to your doctor   If you have compliments or concerns about an experience at your clinic, or if you wish to file a complaint, please contact [facility] Patient Relations at [phone] or email us at [email or web address]         Additional Information About Your Visit        [employer] Information     [employer] gives you secure access to your electronic health record. If you see a primary care provider, you can also send messages to your care team and make appointments. If you have questions, please call your primary care clinic.  If you do not have a primary care provider, please call [phone] and they will assist you.      [employer] is an electronic gateway that provides easy, online access to your medical records. With [employer], you can request a clinic appointment, read your test results, renew a prescription or communicate with your care team.     To access your existing account, please contact your [facility] or call [phone] for assistance.        Care EveryWhere ID     This is your Care EveryWhere ID. This could be used by other organizations to access your [facility] medical records  [id number]        Your Vitals Were     Height BMI (Body Mass Index)                1.626 m (5' 4\") 29.18 kg/m2           Blood Pressure from Last 3 Encounters:   01/03/18 115/82   01/01/18 128/89   12/30/17 111/80    Weight from Last 3 Encounters:   01/17/18 77.1 kg (170 lb)   01/03/18 77.1 kg (170 lb)   01/01/18 77.1 kg (170 lb)              Today, you had the following     No orders found for display         Today's " Medication Changes          These changes are accurate as of: 1/17/18  1:58 PM.  If you have any questions, ask your nurse or doctor.               These medicines have changed or have updated prescriptions.        Dose/Directions    azithromycin 600 MG tablet   Commonly known as:  ZITHROMAX   This may have changed:  See the new instructions.   Used for:  Human immunodeficiency virus (HIV) disease        TAKE 2 TABLETS (1200 MG) BY MOUTH ONCE EVERY 7 DAYS   Quantity:  8 tablet   Refills:  11       sulfamethoxazole-trimethoprim 800-160 MG per tablet   Commonly known as:  BACTRIM DS/SEPTRA DS   This may have changed:    - how much to take  - when to take this  - additional instructions   Used for:  Toxoplasma encephalitis (H), Human immunodeficiency virus (HIV) disease        TAKE 1 TABLET BY MOUTH 2 TIMES DAILY   Quantity:  60 tablet   Refills:  5                Primary Care Provider Office Phone # Fax #    Shanta Munoz [phone]       [address] [address]        Equal Access to Services     RA JORDAN : Hadii aad ku hadasho Soterence, waaxda luqadaha, qaybta kaalmada adeegyada, dez moreno . So [facility] [phone].    ATENCIÓN: Si habla español, tiene a kohli disposición servicios gratuitos de asistencia lingüística. Gurdeep al [phone].    We comply with applicable federal civil rights laws and [place] laws. We do not discriminate on the basis of race, color, national origin, age, disability, sex, sexual orientation, or gender identity.            Thank you!     Thank you for choosing [facility] SPORTS AND ORTHOPAEDIC WALK IN CLINIC  for your care. Our goal is always to provide you with excellent care. Hearing back from our patients is one way we can continue to improve our services. Please take a few minutes to complete the written survey that you may receive in the mail after your visit with us. Thank you!             Your Updated  Medication List - Protect others around you: Learn how to safely use, store and throw away your medicines at www.disposemymeds.org.          This list is accurate as of: 1/17/18  1:58 PM.  Always use your most recent med list.                   Brand Name Dispense Instructions for use Diagnosis    azithromycin 600 MG tablet    ZITHROMAX    8 tablet    TAKE 2 TABLETS (1200 MG) BY MOUTH ONCE EVERY 7 DAYS    Human immunodeficiency virus (HIV) disease       benzonatate 100 MG capsule    TESSALON    30 capsule    Take 1 capsule (100 mg) by mouth 3 times daily as needed for cough        blood glucose lancets standard    no brand specified    100 Box    Use to test blood sugar four times daily or as directed.    Type 2 diabetes mellitus without complication, without long-term current use of insulin (H)       blood glucose monitoring meter device kit    no brand specified    1 kit    Use to test blood sugar four times daily or as directed.    Type 2 diabetes mellitus without complication, without long-term current use of insulin (H)       blood glucose monitoring test strip    no brand specified    100 each    Use to test blood sugars four times daily or as directed    Type 2 diabetes mellitus without complication, without long-term current use of insulin (H)       Xymhmfy-Eucma-Cdxnltdb-TenofAF [phone] MG Tabs per tablet    GENVOYA    30 tablet    Take 1 tablet by mouth daily    Human immunodeficiency virus (HIV) disease       gabapentin 300 MG capsule    NEURONTIN    90 capsule    Take 1 capsule (300 mg) by mouth 3 times daily    Herpes zoster without complication       metFORMIN 500 MG tablet    GLUCOPHAGE    120 tablet    TAKE TWO TABLETS BY MOUTH TWICE A DAY WITH MEALS    DM (diabetes mellitus) (H)       omeprazole 20 MG CR capsule    priLOSEC    60 capsule    TAKE ONE CAPSULE BY MOUTH EVERY DAY    Epigastric pain       pseudoePHEDrine 30 MG tablet    SUDAFED    112 tablet    Take 2 tablets (60 mg) by mouth  every 6 hours as needed for congestion        sulfamethoxazole-trimethoprim 800-160 MG per tablet    BACTRIM DS/SEPTRA DS    60 tablet    TAKE 1 TABLET BY MOUTH 2 TIMES DAILY    Toxoplasma encephalitis (H), Human immunodeficiency virus (HIV) disease

## 2018-01-17 NOTE — NURSING NOTE
[address] [address]  Dept: [phone]  ______________________________________________________________________________    Patient: Andrew Lockwood   : 1967   MRN: [id number]   2018    INVASIVE PROCEDURE SAFETY CHECKLIST    Date: 2017   Procedure: Left Knee Kenalog Injection  Patient Name: Andrew Lockwood  MRN: [id number]  YOB: 1967    Action: Complete sections as appropriate. Any discrepancy results in a HARD COPY until resolved.     PRE PROCEDURE:  Patient ID verified with 2 identifiers (name and  or MRN): Yes  Procedure and site verified with patient/designee (when able): Yes  Accurate consent documentation in medical record: Yes  H&P (or appropriate assessment) documented in medical record: Yes  H&P must be up to 20 days prior to procedure and updates within 24 hours of procedure as applicable: NA  Relevant diagnostic and radiology test results appropriately labeled and displayed as applicable: Yes  Procedure site(s) marked with provider initials: NA    TIMEOUT:  Time-Out performed immediately prior to starting procedure, including verbal and active participation of all team members addressing the following:Yes  * Correct patient identify  * Confirmed that the correct side and site are marked  * An accurate procedure consent form  * Agreement on the procedure to be done  * Correct patient position  * Relevant images and results are properly labeled and appropriately displayed  * The need to administer antibiotics or fluids for irrigation purposes during the procedure as applicable   * Safety precautions based on patient history or medication use    DURING PROCEDURE: Verification of correct person, site, and procedures any time the responsibility for care of the patient is transferred to another member of the care team.     The following medication was given:     MEDICATION:  Kenalog 40 mg  ROUTE: Intra  Articular  SITE: Left Knee  DOSE: 1 cc  LOT #: [id number]  : [employer]  EXPIRATION DATE: MAY2019  NDC#: 2676-1452-84   Was there drug waste? No    MEDICATION:  Lidocaine without epinephrine  ROUTE: Intra Articular  SITE: Left Knee  DOSE: 4 cc  LOT #: [id number]  : [employer]  EXPIRATION DATE: 09/21  NDC#: 42078-059-58   Was there drug waste? Yes  Amount of drug waste (mL): 16.  Reason for waste:  Single use vial    Ceci Kiran ATC  January 17, 2018

## 2018-01-17 NOTE — PATIENT INSTRUCTIONS
Knee Exercises     You may do the first 7 exercises right away. You may do the rest of the exercises when the pain in your knee has decreased.    Passive knee extension: Do this exercise if you are unable to extend your knee fully. While lying on your back, place a rolled-up towel under the heel of your injured leg so the heel is about 6 inches off the ground. Relax your leg muscles and let gravity slowly straighten your knee. Try to hold this position for 2 minutes. Repeat 3 times. You may feel some discomfort while doing this exercise. Do the exercise several times a day.  This exercise can also be done while sitting in a chair with your heel on another chair or stool.    Heel slide: Sit on a firm surface with your legs straight in front of you. Slowly slide the heel of the foot on your injured side toward your buttock by pulling your knee toward your chest as you slide the heel. Return to the starting position. Do 2 sets of 15.  Standing calf stretch: Stand facing a wall with your hands on the wall at about eye level. Keep your injured leg back with your heel on the floor. Keep the other leg forward with the knee bent. Turn your back foot slightly inward (as if you were pigeon-toed). Slowly lean into the wall until you feel a stretch in the back of your calf. Hold the stretch for 15 to 30 seconds. Return to the starting position. Repeat 3 times. Do this exercise several times each day.    Hamstring stretch on wall: Lie on your back with your buttocks close to a doorway. Stretch your uninjured leg straight out in front of you on the floor through the doorway. Raise your injured leg and rest it against the wall next to the door frame. Keep your leg as straight as possible. You should feel a stretch in the back of your thigh. Hold this position for 15 to 30 seconds. Repeat 3 times.  Straight leg raise: Lie on your back with your legs straight out in front of you. Bend the knee on your uninjured side and place the  foot flat on the floor. Tighten the thigh muscle on your injured side and lift your leg about 8 inches off the floor. Keep your leg straight and your thigh muscle tight. Slowly lower your leg back down to the floor. Do 2 sets of 15.    Prone hip extension: Lie on your stomach with your legs straight out behind you. Fold your arms under your head and rest your head on your arms. Draw your belly button in towards your spine and tighten your abdominal muscles. Tighten the buttocks and thigh muscles of the leg on your injured side and lift the leg off the floor about 8 inches. Keep your leg straight. Hold for 5 seconds. Then lower your leg and relax. Do 2 sets of 15.  Clam exercise: Lie on your uninjured side with your hips and knees bent and feet together. Slowly raise your top leg toward the ceiling while keeping your heels touching each other. Hold for 2 seconds and lower slowly. Do 2 sets of 15 repetitions.    Wall squat with a ball: Stand with your back, shoulders, and head against a wall. Look straight ahead. Keep your shoulders relaxed and your feet 3 feet (90 centimeters) from the wall and shoulder's width apart. Place a soccer or basketball-sized ball behind your back. Keeping your back against the wall, slowly squat down to a 45-degree angle. Your thighs will not yet be parallel to the floor. Hold this position for 10 seconds and then slowly slide back up the wall. Repeat 10 times. Build up to 2 sets of 15.  Step-up: Stand with the foot of your injured leg on a support 3 to 5 inches (8 to 13 centimeters) high --like a small step or block of wood. Keep your other foot flat on the floor. Shift your weight onto the injured leg on the support. Straighten your injured leg as the other leg comes off the floor. Return to the starting position by bending your injured leg and slowly lowering your uninjured leg back to the floor. Do 2 sets of 15.    Knee stabilization: Wrap a piece of elastic tubing around the ankle of  your uninjured leg. Tie a knot in the other end of the tubing and close it in a door at about ankle height.  Stand facing the door on the leg without tubing (your injured leg) and bend your knee slightly, keeping your thigh muscles tight. Stay in this position while you move the leg with the tubing (the uninjured leg) straight back behind you. Do 2 sets of 15.  Turn 90 degrees so the leg without tubing is closest to the door. Move the leg with tubing away from your body. Do 2 sets of 15.  Turn 90 degrees again so your back is to the door. Move the leg with tubing straight out in front of you. Do 2 sets of 15.  Turn your body 90 degrees again so the leg with tubing is closest to the door. Move the leg with tubing across your body. Do 2 sets of 15.  Hold onto a chair if you need help balancing. This exercise can be made more challenging by standing on a firm pillow or foam mat while you move the leg with tubing.    Resisted terminal knee extension: Make a loop with a piece of elastic tubing by tying a knot in both ends. Close the knot in a door at knee height. Step into the loop with your injured leg so the tubing is around the back of your knee. Lift the other foot off the ground and hold onto a chair for balance, if needed. Bend the knee with tubing about 45 degrees. Slowly straighten your leg, keeping your thigh muscle tight as you do this. Repeat 15 times. Do 2 sets of 15. If you need an easier way to do this, stand on both legs for better support while you do the exercise.  If you have access to a wobble board, do the following exercises:          Developed by [employer].  Published by [employer].  Copyright  2014 [employer] and/or one of its subsidiaries. All rights reserved.

## 2018-01-17 NOTE — LETTER
1/17/2018       RE: Andrew Lockwood  [address]  [facility] [address]     Dear Colleague,    Thank you for referring your patient, Andrew Lockwood, to the [facility] SPORTS AND ORTHOPAEDIC WALK IN CLINIC at [facility]. Please see a copy of my visit note below.          SPORTS & ORTHOPEDIC WALK-IN VISIT 1/17/2018    Has been diagnosed with left knee osteoarthritis and chronic knee pain. Has had a few injections, last one 8/22 by Dr. Ingram. Lasted about 4 months. Patient states the pain starts about mid medial low leg and travels up through thigh, most pain medial posterior knee. Would like another injection. Patient is homeless and has AIDS.     Reason for visit:     What part of your body is injured / painful?  left knee    What caused the injury /pain? No inciting event     How long ago did your injury occur or pain begin? Problem longstanding, this episode started a week ago    What are your most bothersome symptoms? Pain    How would you characterize your symptom?  constant    What makes your symptoms better? Nothing    What makes your symptoms worse? Standing and Walking    Have you been previously seen for this problem? Yes, Dr. Ingram    Medical History:    Any recent changes to your medical history? No    Any new medication prescribed since last visit? No    Have you had surgery on this body part before? No    Social History:    Handedness: Right    Exercise: 7 days/week    Review of Systems:    Do you have fever, chills, weight loss? No    Do you have any vision problems? No    Do you have any chest pain or edema? No    Do you have any shortness of breath or wheezing?  No    Do you have stomach problems? Yes, appointment in April    Do you have any numbness or focal weakness? Yes, in knee    Do you have diabetes? Yes, type 2    Do you have problems with bleeding or clotting? No    Do you have an rashes or other skin lesions? No           CHIEF COMPLAINT:  Consult (Left  Knee)       HISTORY OF PRESENT ILLNESS  Mr. Lockwood is a pleasant 50 year old year old male who presents to clinic today with acute on chronic left knee pain.  Andrew explains that he has bilateral osteoarthritis diagnosed by Dr. Ingram.  He has had previous corticosteroid injections of knees which has helped in the past.  Last injection 8/2017 with 5 months of relief.  Right knee continues to experience relief.  Has not completed PT.    Additional history: as documented    MEDICAL HISTORY  Patient Active Problem List   Diagnosis     Allergic rhinitis due to other allergen     Brain lesion     Toxoplasma encephalitis (H)     Pulmonary nodules     Human immunodeficiency virus I infection (H)     Folliculitis     Prurigo nodularis     Epidermal cyst     Shoulder joint pain, unspecified laterality     CNS toxoplasmosis (H)     Constipation     Non-intractable vomiting with nausea, vomiting of unspecified type     Thrush     Insomnia, unspecified insomnia     Bowel obstruction     Toxoplasmosis     Gastroesophageal reflux disease     Headache     Aphthous ulcer     Type 2 diabetes mellitus (H)     Small bowel obstruction     SBO (small bowel obstruction)     Slow transit constipation     Periungual wart     Herpes zoster     Erectile dysfunction, unspecified erectile dysfunction type     Insomnia, unspecified type     Preventative health care     Pain of right thumb     Rupture of ulnar collateral ligament of right thumb     Aftercare following surgery of the musculoskeletal system     Panic disorder     Generalized anxiety disorder     Major depressive disorder, single episode, unspecified     Abdominal pain       Current Outpatient Prescriptions   Medication Sig Dispense Refill     benzonatate (TESSALON) 100 MG capsule Take 1 capsule (100 mg) by mouth 3 times daily as needed for cough 30 capsule 0     pseudoePHEDrine (SUDAFED) 30 MG tablet Take 2 tablets (60 mg) by mouth every 6 hours as needed for congestion 112 tablet  0     metFORMIN (GLUCOPHAGE) 500 MG tablet TAKE TWO TABLETS BY MOUTH TWICE A DAY WITH MEALS 120 tablet 3     azithromycin (ZITHROMAX) 600 MG tablet TAKE 2 TABLETS (1200 MG) BY MOUTH ONCE EVERY 7 DAYS (Patient taking differently: TAKE 1 TABLET (600 mg) BY MOUTH ONCE EVERY 7 DAYS) 8 tablet 11     sulfamethoxazole-trimethoprim (BACTRIM DS/SEPTRA DS) 800-160 MG per tablet TAKE 1 TABLET BY MOUTH 2 TIMES DAILY (Patient taking differently: 4 tablets 2 times daily TAKE 1 TABLET BY MOUTH 2 TIMES DAILY) 60 tablet 5     gabapentin (NEURONTIN) 300 MG capsule Take 1 capsule (300 mg) by mouth 3 times daily 90 capsule 5     GENVOYA [id number] mg tablet Take 1 tablet by mouth daily 30 tablet 5     omeprazole (PRILOSEC) 20 MG CR capsule TAKE ONE CAPSULE BY MOUTH EVERY DAY 60 capsule 3     blood glucose monitoring (NO BRAND SPECIFIED) meter device kit Use to test blood sugar four times daily or as directed. 1 kit 0     blood glucose monitoring (NO BRAND SPECIFIED) test strip Use to test blood sugars four times daily or as directed 100 each 5     blood glucose (NO BRAND SPECIFIED) lancets standard Use to test blood sugar four times daily or as directed. 100 Box 5       Allergies   Allergen Reactions     Milk [Lac Bovis] Hives     Tylenol [Acetaminophen] Itching       Family History   Problem Relation Age of Onset     DIABETES Brother      DIABETES Father      Alzheimer Disease Father      Unknown/Adopted Mother      DIABETES Paternal Grandfather      CANCER No family hx of      no skin cancer     Skin Cancer No family hx of      no famiy hx of skin cancer       Additional medical/Social/Surgical histories reviewed in [facility] and updated as appropriate.     REVIEW OF SYSTEMS (1/18/2018)  CONSTITUTIONAL: Denies fever and weight loss  EYES: Denies acute vision changes  ENT: Denies hearing changes or difficulty swallowing  CARDIAC: Denies chest pain or edema  RESPIRATORY: Denies dyspnea, cough or wheeze  GASTROINTESTINAL: Denies  "abdominal pain, nausea, vomiting  MUSCULOSKELETAL: See HPI  SKIN: Denies any recent rash or lesion  NEUROLOGICAL: Denies numbness or focal weakness  PSYCHIATRIC: No history of psychiatric symptoms or problems  ENDOCRINE: Denies current diagnosis of diabetes   Lab Results   Component Value Date    A1C 6.0 08/11/2017    A1C 6.2 05/23/2017    A1C 6.4 01/23/2017    A1C 7.0 08/18/2016    A1C 8.3 06/16/2016     HEMATOLOGY: Denies episodes of easy bleeding      PHYSICAL EXAM  Ht 1.626 m (5' 4\")  Wt 77.1 kg (170 lb)  BMI 29.18 kg/m2      General Appearance: Well appearing, alert, in no acute distress, well-hydrated, and well nourished  Cardiovascular: no signs of upper or lower extremity edema  Respiratory: no respiratory distress, no audible wheezing, no labored breathing, symmetric thoracic excursion  Psychiatric: mood and affect are appropriate, patient is oriented to time, place and person  General  - normal appearance, in no obvious distress  CV  - normal popliteal pulse  Pulm  - normal respiratory pattern, non-labored  Musculoskeletal - knee  - stance: Normal gait without limp, no leg length discrepancy  - inspection: Trace effusion, no ecchymosis.  Normal muscle tone. Normal bone and joint alignment.   - palpation: medial joint line tenderness, patella facet tenderness laterally, and patellar tendon non-tender, normal popliteal pulse  - ROM: 135degrees flexion, -5 degrees extension, painful with terminal extension.  - strength: 5/5 in flexion, 5/5 in extension  - neuro: no sensory or motor deficit  - special tests:  (-) Lachman  (-) anterior drawer  (-) posterior drawer  (-) Raoul  (-) varus at 0 and 30 degrees flexion  (-) valgus at 0 and 30 degrees flexion  (-) Richie s compression test  (-) patellar apprehension  Neuro  - no sensory or motor deficit, grossly normal coordination, normal muscle tone  Skin  - no ecchymosis, erythema, warmth, or induration, no obvious rash  Psych  - interactive, appropriate, " normal mood and affect    ASSESSMENT & PLAN  Mr. Lockwood is a 50 year old year old male PMH HIV undetectable VL, well controlled DM2 who presents to clinic today with acute exacerbation of left knee osteoarthritis.  Radiographs reviewed and confirm left knee moderate medial JSN.  We discussed options including injection, PT and other injectable options.      Diagnosis:   Left knee osteoarthritis    -Corticosteroid injection today  -HEP knee provided  -Continue voltaren gel PRN  -Declines formal PT  -Follow up with Dr. Ingram     It was a pleasure seeing Andrew today.    Aguila Cannon DO, [facility]  Primary Care Sports Medicine    PROCEDURE    Left knee Injection - Intraarticular  The patient was informed of the risks and the benefits of the procedure and a written consent was signed.  The patient s left knee was prepped with chlorhexidine in sterile fashion.   40 mg of triamcinolone suspension was drawn up into a 5 mL syringe with 4 mL of 1% lidocaine.  Injection was performed using substerile technique.  A 1.5-inch 25-gauge needle was used to enter the lateral aspect of the left knee.  Injection performed successfully without difficulty.  There were no complications. The patient tolerated the procedure well. There was negligible bleeding.   The patient was instructed to ice the knee upon leaving clinic and refrain from overuse over the next 3 days.   The patient was instructed to call or go to the emergency room with any unusual pain, swelling, redness, or if otherwise concerned.  A follow up appointment will be scheduled to evaluate response to the injection, and to assess range of motion and pain.      Again, thank you for allowing me to participate in the care of your patient.      Sincerely,    Aguila Cannon DO

## 2018-01-18 RX ORDER — TRIAMCINOLONE ACETONIDE 40 MG/ML
40 INJECTION, SUSPENSION INTRA-ARTICULAR; INTRAMUSCULAR ONCE
Qty: 1 ML | Refills: 0 | OUTPATIENT
Start: 2018-01-18 | End: 2018-01-18

## 2018-01-18 NOTE — PROGRESS NOTES
CHIEF COMPLAINT:  Consult (Left Knee)       HISTORY OF PRESENT ILLNESS  Mr. Lockwood is a pleasant 50 year old year old male who presents to clinic today with acute on chronic left knee pain.  Andrew explains that he has bilateral osteoarthritis diagnosed by Dr. Ingram.  He has had previous corticosteroid injections of knees which has helped in the past.  Last injection 8/2017 with 5 months of relief.  Right knee continues to experience relief.  Has not completed PT.    Additional history: as documented    MEDICAL HISTORY  Patient Active Problem List   Diagnosis     Allergic rhinitis due to other allergen     Brain lesion     Toxoplasma encephalitis (H)     Pulmonary nodules     Human immunodeficiency virus I infection (H)     Folliculitis     Prurigo nodularis     Epidermal cyst     Shoulder joint pain, unspecified laterality     CNS toxoplasmosis (H)     Constipation     Non-intractable vomiting with nausea, vomiting of unspecified type     Thrush     Insomnia, unspecified insomnia     Bowel obstruction     Toxoplasmosis     Gastroesophageal reflux disease     Headache     Aphthous ulcer     Type 2 diabetes mellitus (H)     Small bowel obstruction     SBO (small bowel obstruction)     Slow transit constipation     Periungual wart     Herpes zoster     Erectile dysfunction, unspecified erectile dysfunction type     Insomnia, unspecified type     Preventative health care     Pain of right thumb     Rupture of ulnar collateral ligament of right thumb     Aftercare following surgery of the musculoskeletal system     Panic disorder     Generalized anxiety disorder     Major depressive disorder, single episode, unspecified     Abdominal pain       Current Outpatient Prescriptions   Medication Sig Dispense Refill     benzonatate (TESSALON) 100 MG capsule Take 1 capsule (100 mg) by mouth 3 times daily as needed for cough 30 capsule 0     pseudoePHEDrine (SUDAFED) 30 MG tablet Take 2 tablets (60 mg) by mouth every 6 hours as  needed for congestion 112 tablet 0     metFORMIN (GLUCOPHAGE) 500 MG tablet TAKE TWO TABLETS BY MOUTH TWICE A DAY WITH MEALS 120 tablet 3     azithromycin (ZITHROMAX) 600 MG tablet TAKE 2 TABLETS (1200 MG) BY MOUTH ONCE EVERY 7 DAYS (Patient taking differently: TAKE 1 TABLET (600 mg) BY MOUTH ONCE EVERY 7 DAYS) 8 tablet 11     sulfamethoxazole-trimethoprim (BACTRIM DS/SEPTRA DS) 800-160 MG per tablet TAKE 1 TABLET BY MOUTH 2 TIMES DAILY (Patient taking differently: 4 tablets 2 times daily TAKE 1 TABLET BY MOUTH 2 TIMES DAILY) 60 tablet 5     gabapentin (NEURONTIN) 300 MG capsule Take 1 capsule (300 mg) by mouth 3 times daily 90 capsule 5     GENVOYA [phone] mg tablet Take 1 tablet by mouth daily 30 tablet 5     omeprazole (PRILOSEC) 20 MG CR capsule TAKE ONE CAPSULE BY MOUTH EVERY DAY 60 capsule 3     blood glucose monitoring (NO BRAND SPECIFIED) meter device kit Use to test blood sugar four times daily or as directed. 1 kit 0     blood glucose monitoring (NO BRAND SPECIFIED) test strip Use to test blood sugars four times daily or as directed 100 each 5     blood glucose (NO BRAND SPECIFIED) lancets standard Use to test blood sugar four times daily or as directed. 100 Box 5       Allergies   Allergen Reactions     Milk [Lac Bovis] Hives     Tylenol [Acetaminophen] Itching       Family History   Problem Relation Age of Onset     DIABETES Brother      DIABETES Father      Alzheimer Disease Father      Unknown/Adopted Mother      DIABETES Paternal Grandfather      CANCER No family hx of      no skin cancer     Skin Cancer No family hx of      no famiy hx of skin cancer       Additional medical/Social/Surgical histories reviewed in [facility] and updated as appropriate.     REVIEW OF SYSTEMS (1/18/2018)  CONSTITUTIONAL: Denies fever and weight loss  EYES: Denies acute vision changes  ENT: Denies hearing changes or difficulty swallowing  CARDIAC: Denies chest pain or edema  RESPIRATORY: Denies dyspnea, cough or  "wheeze  GASTROINTESTINAL: Denies abdominal pain, nausea, vomiting  MUSCULOSKELETAL: See HPI  SKIN: Denies any recent rash or lesion  NEUROLOGICAL: Denies numbness or focal weakness  PSYCHIATRIC: No history of psychiatric symptoms or problems  ENDOCRINE: Denies current diagnosis of diabetes   Lab Results   Component Value Date    A1C 6.0 08/11/2017    A1C 6.2 05/23/2017    A1C 6.4 01/23/2017    A1C 7.0 08/18/2016    A1C 8.3 06/16/2016     HEMATOLOGY: Denies episodes of easy bleeding      PHYSICAL EXAM  Ht 1.626 m (5' 4\")  Wt 77.1 kg (170 lb)  BMI 29.18 kg/m2      General Appearance: Well appearing, alert, in no acute distress, well-hydrated, and well nourished  Cardiovascular: no signs of upper or lower extremity edema  Respiratory: no respiratory distress, no audible wheezing, no labored breathing, symmetric thoracic excursion  Psychiatric: mood and affect are appropriate, patient is oriented to time, place and person  General  - normal appearance, in no obvious distress  CV  - normal popliteal pulse  Pulm  - normal respiratory pattern, non-labored  Musculoskeletal - knee  - stance: Normal gait without limp, no leg length discrepancy  - inspection: Trace effusion, no ecchymosis.  Normal muscle tone. Normal bone and joint alignment.   - palpation: medial joint line tenderness, patella facet tenderness laterally, and patellar tendon non-tender, normal popliteal pulse  - ROM: 135degrees flexion, -5 degrees extension, painful with terminal extension.  - strength: 5/5 in flexion, 5/5 in extension  - neuro: no sensory or motor deficit  - special tests:  (-) Lachman  (-) anterior drawer  (-) posterior drawer  (-) Raoul  (-) varus at 0 and 30 degrees flexion  (-) valgus at 0 and 30 degrees flexion  (-) Richie s compression test  (-) patellar apprehension  Neuro  - no sensory or motor deficit, grossly normal coordination, normal muscle tone  Skin  - no ecchymosis, erythema, warmth, or induration, no obvious " rash  Psych  - interactive, appropriate, normal mood and affect    ASSESSMENT & PLAN  Mr. Lockwood is a 50 year old year old male PMH HIV undetectable VL, well controlled DM2 who presents to clinic today with acute exacerbation of left knee osteoarthritis.  Radiographs reviewed and confirm left knee moderate medial JSN.  We discussed options including injection, PT and other injectable options.      Diagnosis:   Left knee osteoarthritis    -Corticosteroid injection today  -HEP knee provided  -Continue voltaren gel PRN  -Declines formal PT  -Follow up with Dr. Ingram     It was a pleasure seeing Andrew today.    Aguila Cannon DO, [facility]  Primary Care Sports Medicine    PROCEDURE    Left knee Injection - Intraarticular  The patient was informed of the risks and the benefits of the procedure and a written consent was signed.  The patient s left knee was prepped with chlorhexidine in sterile fashion.   40 mg of triamcinolone suspension was drawn up into a 5 mL syringe with 4 mL of 1% lidocaine.  Injection was performed using substerile technique.  A 1.5-inch 25-gauge needle was used to enter the lateral aspect of the left knee.  Injection performed successfully without difficulty.  There were no complications. The patient tolerated the procedure well. There was negligible bleeding.   The patient was instructed to ice the knee upon leaving clinic and refrain from overuse over the next 3 days.   The patient was instructed to call or go to the emergency room with any unusual pain, swelling, redness, or if otherwise concerned.  A follow up appointment will be scheduled to evaluate response to the injection, and to assess range of motion and pain.

## 2018-01-23 ENCOUNTER — TELEPHONE (OUTPATIENT)
Dept: PHARMACY | Facility: CLINIC | Age: 53
End: 2018-01-23

## 2018-01-23 ENCOUNTER — HOSPITAL ENCOUNTER (EMERGENCY)
Facility: CLINIC | Age: 53
Discharge: HOME | End: 2018-01-24
Attending: EMERGENCY MEDICINE | Admitting: EMERGENCY MEDICINE
Payer: COMMERCIAL

## 2018-01-23 DIAGNOSIS — R10.13 ABDOMINAL PAIN, EPIGASTRIC: ICD-10-CM

## 2018-01-23 DIAGNOSIS — R11.2 NAUSEA AND VOMITING, INTRACTABILITY OF VOMITING NOT SPECIFIED, UNSPECIFIED VOMITING TYPE: ICD-10-CM

## 2018-01-23 LAB
BASOPHILS # BLD AUTO: 0 10E9/L (ref 0–0.2)
BASOPHILS NFR BLD AUTO: 0.4 %
DIFFERENTIAL METHOD BLD: ABNORMAL
EOSINOPHIL # BLD AUTO: 0.1 10E9/L (ref 0–0.7)
EOSINOPHIL NFR BLD AUTO: 1.4 %
ERYTHROCYTE [DISTWIDTH] IN BLOOD BY AUTOMATED COUNT: 13.4 % (ref 10–15)
HCT VFR BLD AUTO: 40.3 % (ref 40–53)
HGB BLD-MCNC: 13.6 G/DL (ref 13.3–17.7)
IMM GRANULOCYTES # BLD: 0.4 10E9/L (ref 0–0.4)
IMM GRANULOCYTES NFR BLD: 4.5 %
LACTATE BLD-SCNC: 3 MMOL/L (ref 0.7–2)
LYMPHOCYTES # BLD AUTO: 3.2 10E9/L (ref 0.8–5.3)
LYMPHOCYTES NFR BLD AUTO: 32.6 %
MCH RBC QN AUTO: 31.8 PG (ref 26.5–33)
MCHC RBC AUTO-ENTMCNC: 33.7 G/DL (ref 31.5–36.5)
MCV RBC AUTO: 94 FL (ref 78–100)
MONOCYTES # BLD AUTO: 0.7 10E9/L (ref 0–1.3)
MONOCYTES NFR BLD AUTO: 6.8 %
NEUTROPHILS # BLD AUTO: 5.3 10E9/L (ref 1.6–8.3)
NEUTROPHILS NFR BLD AUTO: 54.3 %
NRBC # BLD AUTO: 0 10*3/UL
NRBC BLD AUTO-RTO: 0 /100
PLATELET # BLD AUTO: 235 10E9/L (ref 150–450)
RBC # BLD AUTO: 4.28 10E12/L (ref 4.4–5.9)
WBC # BLD AUTO: 9.8 10E9/L (ref 4–11)

## 2018-01-23 PROCEDURE — 99285 EMERGENCY DEPT VISIT HI MDM: CPT | Mod: Z6 | Performed by: EMERGENCY MEDICINE

## 2018-01-23 PROCEDURE — 25000128 H RX IP 250 OP 636: Performed by: EMERGENCY MEDICINE

## 2018-01-23 PROCEDURE — 83605 ASSAY OF LACTIC ACID: CPT | Performed by: EMERGENCY MEDICINE

## 2018-01-23 PROCEDURE — 85025 COMPLETE CBC W/AUTO DIFF WBC: CPT | Performed by: EMERGENCY MEDICINE

## 2018-01-23 PROCEDURE — 99285 EMERGENCY DEPT VISIT HI MDM: CPT | Mod: 25 | Performed by: EMERGENCY MEDICINE

## 2018-01-23 PROCEDURE — 83690 ASSAY OF LIPASE: CPT | Performed by: EMERGENCY MEDICINE

## 2018-01-23 PROCEDURE — 80053 COMPREHEN METABOLIC PANEL: CPT | Performed by: EMERGENCY MEDICINE

## 2018-01-23 PROCEDURE — 96361 HYDRATE IV INFUSION ADD-ON: CPT | Performed by: EMERGENCY MEDICINE

## 2018-01-23 PROCEDURE — 96374 THER/PROPH/DIAG INJ IV PUSH: CPT | Performed by: EMERGENCY MEDICINE

## 2018-01-23 PROCEDURE — 96375 TX/PRO/DX INJ NEW DRUG ADDON: CPT | Performed by: EMERGENCY MEDICINE

## 2018-01-23 RX ORDER — KETOROLAC TROMETHAMINE 15 MG/ML
15 INJECTION, SOLUTION INTRAMUSCULAR; INTRAVENOUS ONCE
Status: COMPLETED | OUTPATIENT
Start: 2018-01-23 | End: 2018-01-24

## 2018-01-23 RX ORDER — ONDANSETRON 2 MG/ML
4 INJECTION INTRAMUSCULAR; INTRAVENOUS ONCE
Status: COMPLETED | OUTPATIENT
Start: 2018-01-23 | End: 2018-01-23

## 2018-01-23 RX ADMIN — SODIUM CHLORIDE 1000 ML: 9 INJECTION, SOLUTION INTRAVENOUS at 23:31

## 2018-01-23 RX ADMIN — KETOROLAC TROMETHAMINE 15 MG: 15 INJECTION, SOLUTION INTRAMUSCULAR; INTRAVENOUS at 23:59

## 2018-01-23 RX ADMIN — ONDANSETRON 4 MG: 2 INJECTION INTRAMUSCULAR; INTRAVENOUS at 23:32

## 2018-01-23 ASSESSMENT — ENCOUNTER SYMPTOMS
CONFUSION: 0
BLOOD IN STOOL: 0
EYE REDNESS: 0
SHORTNESS OF BREATH: 0
DIFFICULTY URINATING: 0
VOMITING: 0
FEVER: 0
ABDOMINAL PAIN: 0
HEADACHES: 1
NAUSEA: 1
DIARRHEA: 0
ARTHRALGIAS: 0
COLOR CHANGE: 0
NECK STIFFNESS: 0

## 2018-01-23 NOTE — ED AVS SNAPSHOT
[facility], [place], Emergency Department    [address] [place] [address]    Phone:  [phone]                                       Andrew Lockwood   MRN: [id number]    Department:  [facility], Emergency Department   Date of Visit:  1/23/2018           After Visit Summary Signature Page     I have received my discharge instructions, and my questions have been answered. I have discussed any challenges I see with this plan with the nurse or doctor.    ..........................................................................................................................................  Patient/Patient Representative Signature      ..........................................................................................................................................  Patient Representative Print Name and Relationship to Patient    ..................................................               ................................................  Date                                            Time    ..........................................................................................................................................  Reviewed by Signature/Title    ...................................................              ..............................................  Date                                                            Time

## 2018-01-23 NOTE — TELEPHONE ENCOUNTER
Pt stopped in to request medbox be filled. He will .    Follow Up: 01/29/18  Medboxes     Lenka Mabry, [facility]  [phone]

## 2018-01-23 NOTE — ED AVS SNAPSHOT
[facility], Emergency Department    [address] [facility] [address]    Phone:  [phone]                                       Andrew Lockwood   MRN: [id number]    Department:  [facility], Emergency Department   Date of Visit:  1/23/2018           Patient Information     Date Of Birth          11/27/1967        Your diagnoses for this visit were:     Abdominal pain, epigastric     Nausea and vomiting, intractability of vomiting not specified, unspecified vomiting type        You were seen by Kofi Esparza MD.      Follow-up Information     Follow up with Shanta Munoz In 1 week.    Contact information:    [address] [address]  [phone]          Follow up with [facility], Emergency Department.    Specialty:  EMERGENCY MEDICINE    Why:  As needed, If symptoms worsen    Contact information:    [address] [facility] [id number] [phone]    Additional information:    The [facility] is located on the corner of [facility] and [facility] on the [address]. It is easily accessible from virtually any point in the [facility] area, via [employer] and [employer].        Discharge Instructions         Epigastric Pain (Uncertain Cause)     Epigastric pain can be a sign of disease in the upper abdomen. Common causes include:    Acid reflux (stomach acid flowing up into the esophagus)    Gastritis (irritation of the stomach lining)    Peptic Ulcer Disease    Inflammation of the pancreas    Gallstone    Infection in the gallbladder  Pain may be dull or burning. It may spread upward to the chest or to the back. There may be other symptoms such as belching, bloating, cramps or hunger pains. There may be weight loss or poor appetite, nausea or vomiting.  Since the diagnosis of your pain is not certain yet, further tests may sometimes be needed. Sometimes the doctor will treat you for the most likely condition to see  if there is improvement before doing further tests.  Home care  Medicines    Antacids help neutralize the normal acids in your stomach. Examples are Maalox, Mylanta, Rolaids, and Tums. If you don t like the liquid, you can also try a chewable one. You may find one works better than another for you. Overuse can cause diarrhea or constipation.    Acid blockers (H2 blockers) decrease acid production. Examples are cimetidine (Tagamet), famotidine (Pepcid) and ranitidine (Zantac).    Acid inhibitors (PPIs) decrease acid production in a different way than the blockers. You may find they work better, but can take a little longer to take effect.  Examples are omeprazole (Prilosec), lansoprazole (Prevacid), pantoprazole (Protonix), rabeprazole (Aciphex), and esomeprazole (Nexium).    Take an antacid 30-60 minutes after eating and at bedtime, but not at the same time as an acid blocker.    Try not to take NSAIDs. Aspirin may also cause problems, but if taking it for your heart or other medical reasons, talk to your doctor before stopping it; you do not want to cause a worse problem, like a heart attack or stroke.  Diet    If certain foods seem to cause your spasm, try to avoid them.     Eat slowly and chew food well before swallowing. Symptoms of gastritis can be worsened by certain foods. Limit or avoid fatty, fried, and spicy foods, as well as coffee, chocolate, mint, and foods with high acid content such as tomatoes and citrus fruit and juices (orange, grapefruit, lemon).    Avoid alcohol, caffeine, and tobacco, which can delay healing and worsen your problem.    Try eating smaller meals with snacks in between  Follow-up care  Follow up with your healthcare provider or as advised.  When to seek medical advice  Call your healthcare provider right away if any of the following occur:    Stomach pain worsens or moves to the right lower part of the abdomen    Chest pain appears, or if it worsens or spreads to the chest, back,  "neck, shoulder, or arm    Frequent vomiting (can t keep down liquids)    Blood in the stool or vomit (red or black color)    Feeling weak or dizzy, fainting, or having trouble breathing    Fever of 100.4 F (38 C) or higher, or as directed by your healthcare provider    Abdominal swelling  Date Last Reviewed: 9/25/2015 2000-2017 [employer]. [address] [place] [address]. All rights reserved. This information is not intended as a substitute for professional medical care. Always follow your healthcare professional's instructions.           * VOMITING [6yr-Adult]  Vomiting is a common symptom that may be due to different causes. These include gastroenteritis (\"stomach-flu\"), food poisoning and gastritis. There are other more serious causes of vomiting which may be hard to diagnose early in the illness. Therefore, it is important to watch for the warning signs listed below.  The main danger from repeated vomiting is \"dehydration\". This is due to excess loss of water and minerals from the body. When this occurs, body fluids must be replaced.`  HOME CARE:    If symptoms are severe, rest at home for the next 24 hours.    You may use acetaminophen (Tylenol) 650-1000 mg every 6 hours to control fever, unless another medicine was prescribed. [ NOTE : If you have chronic liver disease, talk with your doctor before using acetaminophen.] (Aspirin should never be used in anyone under 18 years of age who is ill with a fever. It may cause severe liver damage.)    Avoid tobacco and alcohol use, which may worsen your symptoms.    If medicines for vomiting were prescribed, take as directed.  DURING THE FIRST 12-24 HOURS follow the diet below. Try to take frequent small sips even if you vomit occasionally:    FRUIT JUICES: Apple, grape juice, clear fruit drinks, electrolyte replacement and sports drinks.    BEVERAGES: Sport drinks such as Gatorade, soft drinks without caffeine; mineral water (plain or " flavored), decaffeinated tea and coffee.    SOUPS: Clear broth, consommé and bouillon    DESSERTS: Plain gelatin (Jell-O), popsicles and fruit juice bars.  DURING THE NEXT 24 HOURS you may add the following to the above:    Hot cereal, plain toast, bread, rolls, crackers    Plain noodles, rice, mashed potatoes, chicken noodle or rice soup    Unsweetened canned fruit (avoid pineapple), bananas    Avoid dairy products    Limit caffeine and chocolate. No spices or seasonings except salt.  DURING THE NEXT 24 HOURS  Slowly go back to a normal diet, as you feel better and your symptoms lessen.  FOLLOW UP with your doctor as advised if you are not improving over the next 2-3 days.  GET PROMPT MEDICAL ATTENTION if any of the following occur:    Constant abdominal pain that stays in the same spot or gets worse    Continued vomiting (unable to keep liquids down) for 24 hours    Frequent diarrhea (more than 5 times a day); blood (red or black color) in diarrhea    No urine output for 12 hours or extreme thirst    Weakness, dizziness or fainting    Unusually drowsy or confused    Fever over 101.0  F (38.3  C) for more than 3 days    Yellow color of the eyes or skin    3929-8653 The [employer]. [address] [place] [address]. All rights reserved. This information is not intended as a substitute for professional medical care. Always follow your healthcare professional's instructions.  This information has been modified by your health care provider with permission from the publisher.      24 Hour Appointment Hotline       To make an appointment at any [facility], call [phone] ([phone]). If you don't have a family doctor or clinic, we will help you find one. [facility] are conveniently located to serve the needs of you and your family.             Review of your medicines      START taking        Dose / Directions Last dose taken    ondansetron 4 MG ODT tab   Commonly known as:  ZOFRAN  ODT   Dose:  4 mg   Quantity:  15 tablet        Take 1 tablet (4 mg) by mouth every 8 hours as needed for nausea   Refills:  0        oxyCODONE IR 5 MG tablet   Commonly known as:  ROXICODONE   Dose:  5 mg   Quantity:  15 tablet        Take 1 tablet (5 mg) by mouth every 4 hours as needed for pain   Refills:  0          Our records show that you are taking the medicines listed below. If these are incorrect, please call your family doctor or clinic.        Dose / Directions Last dose taken    benzonatate 100 MG capsule   Commonly known as:  TESSALON   Dose:  100 mg   Quantity:  30 capsule        Take 1 capsule (100 mg) by mouth 3 times daily as needed for cough   Refills:  0        blood glucose lancets standard   Commonly known as:  no brand specified   Quantity:  100 Box        Use to test blood sugar four times daily or as directed.   Refills:  5        blood glucose monitoring meter device kit   Commonly known as:  no brand specified   Quantity:  1 kit        Use to test blood sugar four times daily or as directed.   Refills:  0        blood glucose monitoring test strip   Commonly known as:  no brand specified   Quantity:  100 each        Use to test blood sugars four times daily or as directed   Refills:  5        Kkhrpjq-Kefew-Ocyokqvv-TenofAF [id number] MG Tabs per tablet   Commonly known as:  GENVOYA   Dose:  1 tablet   Quantity:  30 tablet        Take 1 tablet by mouth daily   Refills:  5        gabapentin 300 MG capsule   Commonly known as:  NEURONTIN   Dose:  300 mg   Quantity:  90 capsule        Take 1 capsule (300 mg) by mouth 3 times daily   Refills:  5        metFORMIN 500 MG tablet   Commonly known as:  GLUCOPHAGE   Quantity:  120 tablet        TAKE TWO TABLETS BY MOUTH TWICE A DAY WITH MEALS   Refills:  3        omeprazole 20 MG CR capsule   Commonly known as:  priLOSEC   Quantity:  60 capsule        TAKE ONE CAPSULE BY MOUTH EVERY DAY   Refills:  3        pseudoePHEDrine 30 MG tablet   Commonly  known as:  SUDAFED   Dose:  60 mg   Quantity:  112 tablet        Take 2 tablets (60 mg) by mouth every 6 hours as needed for congestion   Refills:  0        sulfamethoxazole-trimethoprim 800-160 MG per tablet   Commonly known as:  BACTRIM DS/SEPTRA DS   Quantity:  60 tablet        TAKE 1 TABLET BY MOUTH 2 TIMES DAILY   Refills:  5                Prescriptions were sent or printed at these locations (2 Prescriptions)                   Other Prescriptions                Printed at Department/Unit printer (2 of 2)         ondansetron (ZOFRAN ODT) 4 MG ODT tab               oxyCODONE IR (ROXICODONE) 5 MG tablet                Procedures and tests performed during your visit     CBC with platelets differential    Comprehensive metabolic panel    Glucose by meter    ISTAT CG4 gases lactate eriberto nursing POCT    ISTAT gases lactate eriberto POCT    Lactic acid whole blood    Lipase      Orders Needing Specimen Collection     Ordered          01/23/18 4654  UA reflex to Microscopic and Culture - STAT, Prio: STAT, Needs to be Collected     Scheduled Task Status   01/23/18 5704 Collect UA reflex to Microscopic and Culture Open   Order Class:  PCU Collect                  Pending Results     No orders found for last 3 day(s).            Pending Culture Results     No orders found for last 3 day(s).            Pending Results Instructions     If you had any lab results that were not finalized at the time of your Discharge, you can call the ED Lab Result RN at [phone]. You will be contacted by this team for any positive Lab results or changes in treatment. The nurses are available 7 days a week from 10A to 6:30P.  You can leave a message 24 hours per day and they will return your call.        Thank you for choosing [facility]       Thank you for choosing [facility] for your care. Our goal is always to provide you with excellent care. Hearing back from our patients is one way we can continue to improve our services. Please take a few  minutes to complete the written survey that you may receive in the mail after you visit with us. Thank you!        [employer] Information     [employer] gives you secure access to your electronic health record. If you see a primary care provider, you can also send messages to your care team and make appointments. If you have questions, please call your primary care clinic.  If you do not have a primary care provider, please call [phone] and they will assist you.        Care EveryWhere ID     This is your Care EveryWhere ID. This could be used by other organizations to access your [facility] medical records  [id number]        Equal Access to Services     [facility] : Apollo Cody, maverick centeno, luis enrique reyna, dez salazar. So [facility] [phone].    ATENCIÓN: Si habla español, tiene a kohli disposición servicios gratuitos de asistencia lingüística. Llame al [phone].    We comply with applicable federal civil rights laws and [place] laws. We do not discriminate on the basis of race, color, national origin, age, disability, sex, sexual orientation, or gender identity.            After Visit Summary       This is your record. Keep this with you and show to your community pharmacist(s) and doctor(s) at your next visit.

## 2018-01-24 ENCOUNTER — OFFICE VISIT (OUTPATIENT)
Dept: INTERNAL MEDICINE | Facility: CLINIC | Age: 53
End: 2018-01-24
Payer: MEDICAID

## 2018-01-24 VITALS
BODY MASS INDEX: 30.57 KG/M2 | DIASTOLIC BLOOD PRESSURE: 79 MMHG | SYSTOLIC BLOOD PRESSURE: 124 MMHG | HEART RATE: 97 BPM | WEIGHT: 178.1 LBS

## 2018-01-24 VITALS
OXYGEN SATURATION: 99 % | WEIGHT: 170 LBS | DIASTOLIC BLOOD PRESSURE: 88 MMHG | TEMPERATURE: 96.5 F | SYSTOLIC BLOOD PRESSURE: 125 MMHG | HEART RATE: 105 BPM | HEIGHT: 64 IN | BODY MASS INDEX: 29.02 KG/M2 | RESPIRATION RATE: 16 BRPM

## 2018-01-24 DIAGNOSIS — G47.33 OSA (OBSTRUCTIVE SLEEP APNEA): ICD-10-CM

## 2018-01-24 DIAGNOSIS — R73.9 HYPERGLYCEMIA: ICD-10-CM

## 2018-01-24 DIAGNOSIS — R10.13 ABDOMINAL PAIN, EPIGASTRIC: ICD-10-CM

## 2018-01-24 DIAGNOSIS — R10.13 ABDOMINAL PAIN, EPIGASTRIC: Primary | ICD-10-CM

## 2018-01-24 DIAGNOSIS — E11.8 TYPE 2 DIABETES MELLITUS WITH COMPLICATION, WITHOUT LONG-TERM CURRENT USE OF INSULIN (H): ICD-10-CM

## 2018-01-24 DIAGNOSIS — F45.8 BRUXISM: ICD-10-CM

## 2018-01-24 LAB
ALBUMIN SERPL-MCNC: 3.5 G/DL (ref 3.4–5)
ALP SERPL-CCNC: 171 U/L (ref 40–150)
ALT SERPL W P-5'-P-CCNC: 27 U/L (ref 0–70)
AMYLASE SERPL-CCNC: 75 U/L (ref 30–110)
ANION GAP SERPL CALCULATED.3IONS-SCNC: 12 MMOL/L (ref 3–14)
AST SERPL W P-5'-P-CCNC: 15 U/L (ref 0–45)
BILIRUB SERPL-MCNC: 0.3 MG/DL (ref 0.2–1.3)
BUN SERPL-MCNC: 20 MG/DL (ref 7–30)
CALCIUM SERPL-MCNC: 8.8 MG/DL (ref 8.5–10.1)
CHLORIDE SERPL-SCNC: 98 MMOL/L (ref 94–109)
CO2 BLDCOV-SCNC: 24 MMOL/L (ref 21–28)
CO2 SERPL-SCNC: 22 MMOL/L (ref 20–32)
CREAT SERPL-MCNC: 0.67 MG/DL (ref 0.66–1.25)
GFR SERPL CREATININE-BSD FRML MDRD: >90 ML/MIN/1.7M2
GLUCOSE BLDC GLUCOMTR-MCNC: 250 MG/DL (ref 70–99)
GLUCOSE BLDC GLUCOMTR-MCNC: 257 MG/DL (ref 70–99)
GLUCOSE SERPL-MCNC: 342 MG/DL (ref 70–99)
LACTATE BLD-SCNC: 1.3 MMOL/L (ref 0.7–2.1)
LACTATE SERPL-SCNC: 1.1 MMOL/L (ref 0.4–2)
LIPASE SERPL-CCNC: 408 U/L (ref 73–393)
LIPASE SERPL-CCNC: 554 U/L (ref 73–393)
PCO2 BLDV: 40 MM HG (ref 40–50)
PH BLDV: 7.39 PH (ref 7.32–7.43)
PO2 BLDV: 69 MM HG (ref 25–47)
POTASSIUM SERPL-SCNC: 3.7 MMOL/L (ref 3.4–5.3)
PROT SERPL-MCNC: 7.1 G/DL (ref 6.8–8.8)
SAO2 % BLDV FROM PO2: 93 %
SODIUM SERPL-SCNC: 132 MMOL/L (ref 133–144)

## 2018-01-24 PROCEDURE — 25000128 H RX IP 250 OP 636: Performed by: EMERGENCY MEDICINE

## 2018-01-24 PROCEDURE — 96375 TX/PRO/DX INJ NEW DRUG ADDON: CPT | Performed by: EMERGENCY MEDICINE

## 2018-01-24 PROCEDURE — 83605 ASSAY OF LACTIC ACID: CPT

## 2018-01-24 PROCEDURE — 00000146 ZZHCL STATISTIC GLUCOSE BY METER IP

## 2018-01-24 PROCEDURE — 96376 TX/PRO/DX INJ SAME DRUG ADON: CPT | Performed by: EMERGENCY MEDICINE

## 2018-01-24 PROCEDURE — 82803 BLOOD GASES ANY COMBINATION: CPT

## 2018-01-24 PROCEDURE — 96361 HYDRATE IV INFUSION ADD-ON: CPT | Performed by: EMERGENCY MEDICINE

## 2018-01-24 RX ORDER — ONDANSETRON 4 MG/1
4 TABLET, ORALLY DISINTEGRATING ORAL EVERY 8 HOURS PRN
Qty: 15 TABLET | Refills: 0 | Status: SHIPPED | OUTPATIENT
Start: 2018-01-24 | End: 2018-01-24

## 2018-01-24 RX ORDER — MONTELUKAST SODIUM 10 MG/1
10 TABLET ORAL AT BEDTIME
COMMUNITY
End: 2018-01-31

## 2018-01-24 RX ORDER — OXYCODONE HYDROCHLORIDE 5 MG/1
5 TABLET ORAL EVERY 4 HOURS PRN
Qty: 15 TABLET | Refills: 0 | Status: SHIPPED | OUTPATIENT
Start: 2018-01-24 | End: 2018-01-24

## 2018-01-24 RX ORDER — GLIPIZIDE 5 MG/1
2.5-5 TABLET ORAL DAILY
Qty: 30 TABLET | Refills: 11 | Status: SHIPPED | OUTPATIENT
Start: 2018-01-24 | End: 2018-02-16

## 2018-01-24 RX ORDER — HYDROMORPHONE HYDROCHLORIDE 1 MG/ML
0.5 INJECTION, SOLUTION INTRAMUSCULAR; INTRAVENOUS; SUBCUTANEOUS ONCE
Status: COMPLETED | OUTPATIENT
Start: 2018-01-24 | End: 2018-01-24

## 2018-01-24 RX ADMIN — HYDROMORPHONE HYDROCHLORIDE 1 MG: 1 INJECTION, SOLUTION INTRAMUSCULAR; INTRAVENOUS; SUBCUTANEOUS at 00:48

## 2018-01-24 RX ADMIN — HYDROMORPHONE HYDROCHLORIDE 0.5 MG: 1 INJECTION, SOLUTION INTRAMUSCULAR; INTRAVENOUS; SUBCUTANEOUS at 01:35

## 2018-01-24 RX ADMIN — SODIUM CHLORIDE 1000 ML: 9 INJECTION, SOLUTION INTRAVENOUS at 00:29

## 2018-01-24 NOTE — NURSING NOTE
Chief Complaint   Patient presents with     Diabetes     Blood sugar was 342 last night, went to ED. States has been taking Metformin, then eats and falls asleep right after.

## 2018-01-24 NOTE — PATIENT INSTRUCTIONS
Dental [phone] ([facility], 4th Floor S)     Sleep Clinic [phone] ([address])    Please go to the lab on the first floor before you leave today.

## 2018-01-24 NOTE — DISCHARGE INSTRUCTIONS
Epigastric Pain (Uncertain Cause)     Epigastric pain can be a sign of disease in the upper abdomen. Common causes include:    Acid reflux (stomach acid flowing up into the esophagus)    Gastritis (irritation of the stomach lining)    Peptic Ulcer Disease    Inflammation of the pancreas    Gallstone    Infection in the gallbladder  Pain may be dull or burning. It may spread upward to the chest or to the back. There may be other symptoms such as belching, bloating, cramps or hunger pains. There may be weight loss or poor appetite, nausea or vomiting.  Since the diagnosis of your pain is not certain yet, further tests may sometimes be needed. Sometimes the doctor will treat you for the most likely condition to see if there is improvement before doing further tests.  Home care  Medicines    Antacids help neutralize the normal acids in your stomach. Examples are Maalox, Mylanta, Rolaids, and Tums. If you don t like the liquid, you can also try a chewable one. You may find one works better than another for you. Overuse can cause diarrhea or constipation.    Acid blockers (H2 blockers) decrease acid production. Examples are cimetidine (Tagamet), famotidine (Pepcid) and ranitidine (Zantac).    Acid inhibitors (PPIs) decrease acid production in a different way than the blockers. You may find they work better, but can take a little longer to take effect.  Examples are omeprazole (Prilosec), lansoprazole (Prevacid), pantoprazole (Protonix), rabeprazole (Aciphex), and esomeprazole (Nexium).    Take an antacid 30-60 minutes after eating and at bedtime, but not at the same time as an acid blocker.    Try not to take NSAIDs. Aspirin may also cause problems, but if taking it for your heart or other medical reasons, talk to your doctor before stopping it; you do not want to cause a worse problem, like a heart attack or stroke.  Diet    If certain foods seem to cause your spasm, try to avoid them.     Eat slowly and chew food well  "before swallowing. Symptoms of gastritis can be worsened by certain foods. Limit or avoid fatty, fried, and spicy foods, as well as coffee, chocolate, mint, and foods with high acid content such as tomatoes and citrus fruit and juices (orange, grapefruit, lemon).    Avoid alcohol, caffeine, and tobacco, which can delay healing and worsen your problem.    Try eating smaller meals with snacks in between  Follow-up care  Follow up with your healthcare provider or as advised.  When to seek medical advice  Call your healthcare provider right away if any of the following occur:    Stomach pain worsens or moves to the right lower part of the abdomen    Chest pain appears, or if it worsens or spreads to the chest, back, neck, shoulder, or arm    Frequent vomiting (can t keep down liquids)    Blood in the stool or vomit (red or black color)    Feeling weak or dizzy, fainting, or having trouble breathing    Fever of 100.4 F (38 C) or higher, or as directed by your healthcare provider    Abdominal swelling  Date Last Reviewed: 9/25/2015 2000-2017 [employer]. [address] [place] [address]. All rights reserved. This information is not intended as a substitute for professional medical care. Always follow your healthcare professional's instructions.           * VOMITING [6yr-Adult]  Vomiting is a common symptom that may be due to different causes. These include gastroenteritis (\"stomach-flu\"), food poisoning and gastritis. There are other more serious causes of vomiting which may be hard to diagnose early in the illness. Therefore, it is important to watch for the warning signs listed below.  The main danger from repeated vomiting is \"dehydration\". This is due to excess loss of water and minerals from the body. When this occurs, body fluids must be replaced.`  HOME CARE:    If symptoms are severe, rest at home for the next 24 hours.    You may use acetaminophen (Tylenol) 650-1000 mg every 6 hours to " control fever, unless another medicine was prescribed. [ NOTE : If you have chronic liver disease, talk with your doctor before using acetaminophen.] (Aspirin should never be used in anyone under 18 years of age who is ill with a fever. It may cause severe liver damage.)    Avoid tobacco and alcohol use, which may worsen your symptoms.    If medicines for vomiting were prescribed, take as directed.  DURING THE FIRST 12-24 HOURS follow the diet below. Try to take frequent small sips even if you vomit occasionally:    FRUIT JUICES: Apple, grape juice, clear fruit drinks, electrolyte replacement and sports drinks.    BEVERAGES: Sport drinks such as Gatorade, soft drinks without caffeine; mineral water (plain or flavored), decaffeinated tea and coffee.    SOUPS: Clear broth, consommé and bouillon    DESSERTS: Plain gelatin (Jell-O), popsicles and fruit juice bars.  DURING THE NEXT 24 HOURS you may add the following to the above:    Hot cereal, plain toast, bread, rolls, crackers    Plain noodles, rice, mashed potatoes, chicken noodle or rice soup    Unsweetened canned fruit (avoid pineapple), bananas    Avoid dairy products    Limit caffeine and chocolate. No spices or seasonings except salt.  DURING THE NEXT 24 HOURS  Slowly go back to a normal diet, as you feel better and your symptoms lessen.  FOLLOW UP with your doctor as advised if you are not improving over the next 2-3 days.  GET PROMPT MEDICAL ATTENTION if any of the following occur:    Constant abdominal pain that stays in the same spot or gets worse    Continued vomiting (unable to keep liquids down) for 24 hours    Frequent diarrhea (more than 5 times a day); blood (red or black color) in diarrhea    No urine output for 12 hours or extreme thirst    Weakness, dizziness or fainting    Unusually drowsy or confused    Fever over 101.0  F (38.3  C) for more than 3 days    Yellow color of the eyes or skin    5654-4736 [employer]. [address] [place] [address]  Road, [place] [address]. All rights reserved. This information is not intended as a substitute for professional medical care. Always follow your healthcare professional's instructions.  This information has been modified by your health care provider with permission from the publisher.

## 2018-01-24 NOTE — MR AVS SNAPSHOT
After Visit Summary   1/24/2018    Andrew Lockwood    MRN: [id number]           Patient Information     Date Of Birth          11/27/1967        Visit Information        Provider Department      1/24/2018 7:30 AM Osbaldo Delacruz MD [facility]        Today's Diagnoses     Abdominal pain, epigastric    -  1    Hyperglycemia        Type 2 diabetes mellitus with complication, without long-term current use of insulin (H)        DANISHA (obstructive sleep apnea)        Bruxism          Care Instructions      Dental [phone] ([facility], 4th Floor S)     Sleep Clinic [phone] ([address])    Please go to the lab on the first floor before you leave today.               Follow-ups after your visit        Additional Services     DENTAL REFERRAL       Your provider has referred you to: [facility]: Dental Clinic [facility] [phone]   [email or web address]    Type: Pain and bruxism  Urgency: Routine  Area: both lower      Please be aware that coverage of these services is subject to the terms and limitations of your health insurance plan.  Call member services at your health plan with any benefit or coverage questions.      Please bring the following with you to your appointment:    (1) Any X-Rays, CTs or MRIs which have been performed.  Contact the facility where they were done to arrange for  prior to your scheduled appointment.  Any new CT, MRI or other procedures ordered by your specialist must be performed at a [place] facility or coordinated by your clinic's referral office.    (2) List of current medications   (3) This referral request   (4) Any documents/labs given to you for this referral            SLEEP EVALUATION & MANAGEMENT REFERRAL - ADULT -Other (Respond in commments)       Please be aware that coverage of these services is subject to the terms and limitations of your health insurance plan.  Call member services at your  health plan with any benefit or coverage questions.      Please bring the following to your appointment:    >>   List of current medications   >>   This referral request   >>   Any documents/labs given to you for this referral                      Follow-up notes from your care team     Return in about 1 week (around 1/31/2018).      Your next 10 appointments already scheduled     Apr 06, 2018  8:30 AM CDT   (Arrive by 8:15 AM)   Return Visit with Jose Ramon Singh MD   [facility] ([facility])    [address] [place] [id number] [phone]              Who to contact     Please call your clinic at [phone] to:    Ask questions about your health    Make or cancel appointments    Discuss your medicines    Learn about your test results    Speak to your doctor   If you have compliments or concerns about an experience at your clinic, or if you wish to file a complaint, please contact [facility] Patient Relations at [phone] or email us at [email or web address]         Additional Information About Your Visit        Curriculethart Information     [employer] gives you secure access to your electronic health record. If you see a primary care provider, you can also send messages to your care team and make appointments. If you have questions, please call your primary care clinic.  If you do not have a primary care provider, please call [phone] and they will assist you.      [employer] is an electronic gateway that provides easy, online access to your medical records. With [employer], you can request a clinic appointment, read your test results, renew a prescription or communicate with your care team.     To access your existing account, please contact your [facility] or call [phone] for assistance.        Care EveryWhere ID     This is your Care EveryWhere ID. This could be used by other  organizations to access your [place] medical records  [id number]        Your Vitals Were     Pulse BMI (Body Mass Index)                97 30.57 kg/m2           Blood Pressure from Last 3 Encounters:   01/24/18 124/79   01/24/18 125/88   01/03/18 115/82    Weight from Last 3 Encounters:   01/24/18 80.8 kg (178 lb 1.6 oz)   01/23/18 77.1 kg (170 lb)   01/17/18 77.1 kg (170 lb)              We Performed the Following     DENTAL REFERRAL     Glucose by meter     SLEEP EVALUATION & MANAGEMENT REFERRAL - ADULT -Other (Respond in commments)          Today's Medication Changes          These changes are accurate as of 1/24/18  3:23 PM.  If you have any questions, ask your nurse or doctor.               Start taking these medicines.        Dose/Directions    glipiZIDE 5 MG tablet   Commonly known as:  GLUCOTROL   Used for:  Type 2 diabetes mellitus with complication, without long-term current use of insulin (H)   Started by:  Osbaldo Delacruz MD        Dose:  2.5-5 mg   Take 0.5-1 tablets (2.5-5 mg) by mouth daily   Quantity:  30 tablet   Refills:  11         These medicines have changed or have updated prescriptions.        Dose/Directions    * blood glucose monitoring meter device kit   Commonly known as:  no brand specified   This may have changed:  Another medication with the same name was added. Make sure you understand how and when to take each.   Used for:  Type 2 diabetes mellitus without complication, without long-term current use of insulin (H)   Changed by:  Osbaldo Delacruz MD        Use to test blood sugar four times daily or as directed.   Quantity:  1 kit   Refills:  0       * blood glucose monitoring meter device kit   Commonly known as:  no brand specified   This may have changed:  You were already taking a medication with the same name, and this prescription was added. Make sure you understand how and when to take each.   Used for:  Type 2 diabetes mellitus with complication, without  long-term current use of insulin (H)   Changed by:  Osbaldo Delacruz MD        Use to test blood sugar 4 times daily or as directed.   Quantity:  1 kit   Refills:  0       * blood glucose monitoring test strip   Commonly known as:  no brand specified   This may have changed:  Another medication with the same name was added. Make sure you understand how and when to take each.   Used for:  Type 2 diabetes mellitus without complication, without long-term current use of insulin (H)   Changed by:  Osbaldo Delacruz MD        Use to test blood sugars four times daily or as directed   Quantity:  100 each   Refills:  5       * blood glucose monitoring test strip   Commonly known as:  no brand specified   This may have changed:  You were already taking a medication with the same name, and this prescription was added. Make sure you understand how and when to take each.   Used for:  Type 2 diabetes mellitus with complication, without long-term current use of insulin (H)   Changed by:  Osbaldo Delacruz MD        Dose:  1 strip   1 strip by In Vitro route 4 times daily (before meals and nightly) Use to test blood sugars 4 times daily or as directed   Quantity:  100 strip   Refills:  11       sulfamethoxazole-trimethoprim 800-160 MG per tablet   Commonly known as:  BACTRIM DS/SEPTRA DS   This may have changed:    - how much to take  - when to take this  - additional instructions   Used for:  Toxoplasma encephalitis (H), Human immunodeficiency virus (HIV) disease        TAKE 1 TABLET BY MOUTH 2 TIMES DAILY   Quantity:  60 tablet   Refills:  5       * Notice:  This list has 4 medication(s) that are the same as other medications prescribed for you. Read the directions carefully, and ask your doctor or other care provider to review them with you.      Stop taking these medicines if you haven't already. Please contact your care team if you have questions.     benzonatate 100 MG capsule   Commonly known as:   TESSALON   Stopped by:  Osbaldo Delacruz MD           metFORMIN 500 MG tablet   Commonly known as:  GLUCOPHAGE   Stopped by:  Osbaldo Delacruz MD                Where to get your medicines      These medications were sent to [facility] [place], [place] - [address]  [address], [facility] [address]    Hours:  TRANSPLANT PHONE NUMBER [phone] Phone:  [phone]     blood glucose monitoring meter device kit    blood glucose monitoring test strip    glipiZIDE 5 MG tablet                Primary Care Provider Office Phone # Fax #    Shanta Munoz [phone]       [address] [address]        Equal Access to Services     RA JORDAN : Hadii carissa lockwood hadasho Soterence, waaxda luqadaha, qaybta kaalmada adeegyada, dez salazar. So [facility] [phone].    ATENCIÓN: Si habla español, tiene a kohli disposición servicios gratuitos de asistencia lingüística. [facility] [phone].    We comply with applicable federal civil rights laws and [place] laws. We do not discriminate on the basis of race, color, national origin, age, disability, sex, sexual orientation, or gender identity.            Thank you!     Thank you for choosing [facility]  for your care. Our goal is always to provide you with excellent care. Hearing back from our patients is one way we can continue to improve our services. Please take a few minutes to complete the written survey that you may receive in the mail after your visit with us. Thank you!             Your Updated Medication List - Protect others around you: Learn how to safely use, store and throw away your medicines at www.disposemymeds.org.          This list is accurate as of 1/24/18  3:23 PM.  Always use your most recent med list.                   Brand Name Dispense Instructions for use Diagnosis    AZITHROMYCIN PO      Take  600 mg by mouth Take 2 tablets every Sunday.        blood glucose lancets standard    no brand specified    100 Box    Use to test blood sugar four times daily or as directed.    Type 2 diabetes mellitus without complication, without long-term current use of insulin (H)       * blood glucose monitoring meter device kit    no brand specified    1 kit    Use to test blood sugar four times daily or as directed.    Type 2 diabetes mellitus without complication, without long-term current use of insulin (H)       * blood glucose monitoring meter device kit    no brand specified    1 kit    Use to test blood sugar 4 times daily or as directed.    Type 2 diabetes mellitus with complication, without long-term current use of insulin (H)       * blood glucose monitoring test strip    no brand specified    100 each    Use to test blood sugars four times daily or as directed    Type 2 diabetes mellitus without complication, without long-term current use of insulin (H)       * blood glucose monitoring test strip    no brand specified    100 strip    1 strip by In Vitro route 4 times daily (before meals and nightly) Use to test blood sugars 4 times daily or as directed    Type 2 diabetes mellitus with complication, without long-term current use of insulin (H)       DICYCLOMINE HCL PO      Take 10 mg by mouth 4 times daily        Clrgevm-Raotu-Huresfum-TenofAF [id number] MG Tabs per tablet    GENVOYA    30 tablet    Take 1 tablet by mouth daily    Human immunodeficiency virus (HIV) disease       gabapentin 300 MG capsule    NEURONTIN    90 capsule    Take 1 capsule (300 mg) by mouth 3 times daily    Herpes zoster without complication       glipiZIDE 5 MG tablet    GLUCOTROL    30 tablet    Take 0.5-1 tablets (2.5-5 mg) by mouth daily    Type 2 diabetes mellitus with complication, without long-term current use of insulin (H)       montelukast 10 MG tablet    SINGULAIR     Take 10 mg by mouth At Bedtime        pseudoePHEDrine 30  MG tablet    SUDAFED    112 tablet    Take 2 tablets (60 mg) by mouth every 6 hours as needed for congestion        sulfamethoxazole-trimethoprim 800-160 MG per tablet    BACTRIM DS/SEPTRA DS    60 tablet    TAKE 1 TABLET BY MOUTH 2 TIMES DAILY    Toxoplasma encephalitis (H), Human immunodeficiency virus (HIV) disease       * Notice:  This list has 4 medication(s) that are the same as other medications prescribed for you. Read the directions carefully, and ask your doctor or other care provider to review them with you.

## 2018-01-24 NOTE — ED PROVIDER NOTES
"  History     Chief Complaint   Patient presents with     Abdominal Pain     HPI  Andrew Lockwood is a 50 year old male with a history diabetes type 2, HIV/AIDS, CNS toxoplasmosis, chronic abdominal pain, recurrent SBO with abdominal cramping and acid reflux who presents to the ED today with abdominal pain. Patient states that he ate \"bad shrimp\" (shrimp was raw) this evening and has had abdominal pain since. Patient reports being bloated, having diffuse back pain, a headache, and nausea. Patient states prior to ingesting the shrimp, he was in his usual state of health and denies diarrhea, blood in stool, or vomiting. Patient states they have experienced this pain before when ingesting raw shrimp. Patient reports taking oral metformin for diabetes.      PAST MEDICAL HISTORY:   Past Medical History:   Diagnosis Date     AIDS (H)      Allergic rhinitis due to other allergen     DNS     Chronic abdominal pain      CNS toxoplasmosis (H)      Diabetes type 2, controlled (H)      GERD (gastroesophageal reflux disease)      HIV (human immunodeficiency virus infection) (H)      Periungual wart      Sleep apnea     doesn't use CPAP       PAST SURGICAL HISTORY:   Past Surgical History:   Procedure Laterality Date     C NONSPECIFIC PROCEDURE      right forearm fracture     COLONOSCOPY Left 1/22/2016    Procedure: COMBINED COLONOSCOPY, SINGLE OR MULTIPLE BIOPSY/POLYPECTOMY BY BIOPSY;  Surgeon: Clark Saini MD;  Location: [facility]     HC EXPLORE UNDESC TESTIS,INGUIN/SCROTAL       LAPAROSCOPY DIAGNOSTIC (GENERAL) N/A 7/26/2016    Procedure: LAPAROSCOPY DIAGNOSTIC (GENERAL);  Surgeon: Susannah Arriaga MD;  [facility]     OPTICAL TRACKING SYSTEM CRANIOTOMY, EXCISE TUMOR, COMBINED Left 4/10/2015    Procedure: COMBINED OPTICAL TRACKING SYSTEM CRANIOTOMY, EXCISE TUMOR;  Surgeon: Mirlande Colmenares MD;  Location: [facility] OR     REPAIR GAMEKEEPER'S THUMB Right 12/2/2016    Procedure: REPAIR LIGAMENT ULNAR COLLATERAL THUMB " (STEFANY'S);  Surgeon: Evin Zamorano MD;  Location:  OR       FAMILY HISTORY:   Family History   Problem Relation Age of Onset     DIABETES Brother      DIABETES Father      Alzheimer Disease Father      Unknown/Adopted Mother      DIABETES Paternal Grandfather      CANCER No family hx of      no skin cancer     Skin Cancer No family hx of      no famiy hx of skin cancer       SOCIAL HISTORY:   Social History   Substance Use Topics     Smoking status: Current Every Day Smoker     Packs/day: 0.20     Last attempt to quit: 10/28/2016     Smokeless tobacco: Former User      Comment: 4 cigarettes     Alcohol use No      Comment: Last etoh in 2007       Patient's Medications   New Prescriptions    No medications on file   Previous Medications    BENZONATATE (TESSALON) 100 MG CAPSULE    Take 1 capsule (100 mg) by mouth 3 times daily as needed for cough    BLOOD GLUCOSE (NO BRAND SPECIFIED) LANCETS STANDARD    Use to test blood sugar four times daily or as directed.    BLOOD GLUCOSE MONITORING (NO BRAND SPECIFIED) METER DEVICE KIT    Use to test blood sugar four times daily or as directed.    BLOOD GLUCOSE MONITORING (NO BRAND SPECIFIED) TEST STRIP    Use to test blood sugars four times daily or as directed    GABAPENTIN (NEURONTIN) 300 MG CAPSULE    Take 1 capsule (300 mg) by mouth 3 times daily    GENVOYA [id number] MG TABLET    Take 1 tablet by mouth daily    METFORMIN (GLUCOPHAGE) 500 MG TABLET    TAKE TWO TABLETS BY MOUTH TWICE A DAY WITH MEALS    OMEPRAZOLE (PRILOSEC) 20 MG CR CAPSULE    TAKE ONE CAPSULE BY MOUTH EVERY DAY    PSEUDOEPHEDRINE (SUDAFED) 30 MG TABLET    Take 2 tablets (60 mg) by mouth every 6 hours as needed for congestion    SULFAMETHOXAZOLE-TRIMETHOPRIM (BACTRIM DS/SEPTRA DS) 800-160 MG PER TABLET    TAKE 1 TABLET BY MOUTH 2 TIMES DAILY   Modified Medications    No medications on file   Discontinued Medications    AZITHROMYCIN (ZITHROMAX) 600 MG TABLET    TAKE 2 TABLETS (1200 MG) BY  "MOUTH ONCE EVERY 7 DAYS          Allergies   Allergen Reactions     Milk [Lac Bovis] Hives     Tylenol [Acetaminophen] Itching       I have reviewed the Medications, Allergies, Past Medical and Surgical History, and Social History in the Epic system.    Review of Systems   Constitutional: Negative for fever.   HENT: Negative for congestion.    Eyes: Negative for redness.   Respiratory: Negative for shortness of breath.    Cardiovascular: Negative for chest pain.   Gastrointestinal: Positive for nausea. Negative for abdominal pain, blood in stool, diarrhea and vomiting.   Genitourinary: Negative for difficulty urinating.   Musculoskeletal: Negative for arthralgias and neck stiffness.   Skin: Negative for color change.   Neurological: Positive for headaches.   Psychiatric/Behavioral: Negative for confusion.   All other systems reviewed and are negative.      Physical Exam   BP: (!) 145/92  Pulse: 105  Temp: 96.5  F (35.8  C)  Resp: 16  Height: 162.6 cm (5' 4\")  Weight: 77.1 kg (170 lb)  SpO2: 96 %      Physical Exam   Constitutional: He is oriented to person, place, and time. He appears well-developed.   HENT:   Head: Normocephalic and atraumatic.   Mouth/Throat: Oropharynx is clear and moist.   Eyes: Conjunctivae are normal. Pupils are equal, round, and reactive to light.   Cardiovascular: Regular rhythm and normal heart sounds.  Tachycardia present.    Pulmonary/Chest: Effort normal. No respiratory distress. He has no wheezes. He has no rales.   Abdominal: He exhibits no distension and no mass. There is no rigidity and no guarding.       Musculoskeletal: Normal range of motion. He exhibits no edema or tenderness.   Neurological: He is alert and oriented to person, place, and time. No cranial nerve deficit.   Skin: Skin is warm and dry. No rash noted.   Psychiatric: He has a normal mood and affect.       ED Course     ED Course     Procedures             Labs Ordered and Resulted from Time of ED Arrival Up to the " "Time of Departure from the ED - No data to display         Assessments & Plan (with Medical Decision Making)   50-year-old male with history of HIV/AIDS arriving to the emergency department for evaluation of abdominal discomfort following what he believes to be relation to eating bad seafood.  On my evaluation this patient noted to be alert, he is currently afebrile and hemodynamically stable with mild hypertension and tachycardia.  Evaluation abdomen reveals pain with palpation predominately epigastrium with no pain upon palpation of the right lower quadrant.  There is no appreciable distention on my examination however the patient does feel in \"bloated\".  There is otherwise no signs of external trauma.  Of note this patient has frequent ED visits with similar type presentation of abdominal discomfort.  He is reportedly following with a gastroenterologist but has not seen this provider in quite some time.  Based on my examination, frequent reassuring laboratory studies afebrile status I would hold on CT imaging of the abdomen pelvis at this time.  In the emergency department he received IV fluids, Zofran, and Toradol with overall improvement of symptoms.  Laboratory studies do demonstrate a mild elevation in lactate and lipase.  Bedside ultrasonography does not reveal any sign of pericholecystic fluid or intra-abdominal free fluid.  Re-evaluation of the abdomen demonstrates no involuntary guarding he continues to have pain predominantly located in the epigastrium.  At this time my suspicion the patient requires emergent hospitalization or CT scan to evaluate for significant pancreatitis would be low.  He was noted to have a elevated glucose.  The patient states he has plans to follow-up with his primary care physician in the next 1-2 days to aid in adjusting his antihyperglycemic medications.  Following IV fluid, did repeat a CG4 and lactic acid level which reveal return to normal of lab studies.  He is non " acidotic and I do not suspect DKA.  He is tolerating PO and feels much better        I have reviewed the nursing notes.    I have reviewed the findings, diagnosis, plan and need for follow up with the patient.    New Prescriptions    No medications on file       Final diagnoses:   Abdominal pain, epigastric   Nausea and vomiting, intractability of vomiting not specified, unspecified vomiting type     I, Kate Woodruff, am serving as a trained medical scribe to document services personally performed by Kofi Esparza MD, based on the provider's statements to me.      I, Kofi Esparza MD, was physically present and have reviewed and verified the accuracy of this note documented by Kate Woodruff.   1/23/2018   [facility], [place], EMERGENCY DEPARTMENT     Kofi Esparza MD  01/24/18 1932

## 2018-01-24 NOTE — PROGRESS NOTES
"SUBJECTIVE: Chief complaint: Elevated blood glucose readings and follow-up of abdominal discomfort.  This 50-year-old man reports recent difficulty maintaining acceptable blood glucose readings due to delays in receiving an immigration work permit and resultant unemployment.  Without income, he is now homeless, living with an acquaintance, and eating poorly.  He reports a several-year history of intermittent abdominal cramping, typically after consumption of \"raw food\"; a recent emergency center visit followed consumption of shellfish and a previous episode followed consumption of raw nuts.  Reports that the abdominal bloating described at his recent emergency center visit has improved but not fully subsided.  He reports a long history of intermittent epigastric region discomfort, for which he has undergone extensive testing, all of which was by his report negative.  He reports that use of omeprazole and ranitidine have not relieved his symptoms; his only relief comes with use of liquid antacids such as Maalox.  Previous screening for Helicobacter pylori was negative.  CT of the abdomen and 11/2017 showed circumferential thickening of the wall of the pylorus, possibly representing gastritis versus sequela of peptic ulcer disease.  Notes from emergency center visit yesterday are not available; apparently was prescribed ondansetron and oxycodone following the evaluation.  He reports improvement of his symptoms without treatment.  Currently he denies nausea, vomiting, fever, diarrhea, melena, and hematochezia.  He reports normal bowel movements and passage of flatus.  He indicates that metformin treatment was instituted shortly before the onset of his abdominal symptoms; he does not recall having stopped the medication since that time.  He requests renewed prescriptions for CPAP and associated supplies for treatment of previously diagnosed obstructive sleep apnea.  He reports long-standing bruxism, with associated " dental problems, for which he requests consideration of botulinum toxin treatment.    Past Medical History: Reviewed and updated in patient health profile.     Adverse Drug Reactions: Reviewed and updated in patient health profile.     Current Medications: Reviewed and updated in patient health profile.     OBJECTIVE:     Vital signs: Reviewed in patient health profile.  General: Alert, neatly dressed and groomed, in no acute distress.  HEENT: Atraumatic and normocephalic. Eyelids, pupils, and conjunctivae appeared normal, without scleral icterus or erythema. Lips, teeth and gums appear normal.  Oropharynx showed no exudate or erythema.  Neck: Supple, without thyromegaly, mass, or bruit. No cervical or supraclavicular lymphadenopathy.  Back: No spinal or costovertebral angle tenderness.  Chest: Clear to auscultation and percussion. Normal respiratory effort.  Cardiovascular: No jugular venous distention. Regular rate and rhythm, normal S1, S2 without murmur.  Abdomen: Bowel sounds positive; soft, with mild epigastric region tenderness, without rebound, guarding, hepatosplenomegaly or mass.  Extremities: No cyanosis or edema.    Blood glucose 408.  Amylase 75.  Lipase 408.  Lactic acid 1.1.     ASSESSMENT:    1.  Abdominal pain.  Long-standing intermittent epigastric discomfort, without clearly identified triggers.  Current examination is notable for epigastric tenderness, without rebound or guarding.  CT in 11/2017 showed no evidence of significant pancreas or hepatobiliary abnormalities.  Previous evaluation with endoscopy was negative for Helicobacter pylori and other pathology.  He has noted little improvement of his symptoms with use of PPI and H2 antagonist treatment.  He does report that use of liquid oral antacids provides temporary symptom relief.  He indicates that onset of symptoms followed initiation of metformin treatment.  In the setting of suboptimal blood glucose control and possible adverse reaction  to metformin, I recommended that he discontinue metformin.  In addition, he will discontinue omeprazole, ondansetron, and oxycodone.  He will use oral antacids as needed for abdominal discomfort and arrange further evaluation in the event of aggressive or persistent symptoms.  I recommended prompt valuation in the event of fever, worsening abdominal pain, nausea, vomiting, or change in bowel movements.  He agrees to arrange follow up evaluation in primary care clinic within one week for review of progress.    2.  Type 2 diabetes mellitus.  Suboptimal control with metformin at current dose.  Given concern regarding potential side effect of abdominal discomfort, he will discontinue metformin and begin glipizide.  With potential interaction with several of his other medications posing the risk of CYP inhibition and heightened hypoglycemic effect, he will begin treatment at 2.5 mg daily, following blood glucose readings 4 times daily, and calling in the event of readings above 300.  Based on follow-up blood glucose readings, dose of glipizide can be increased as needed.    3.  Obstructive sleep apnea.  Sleep clinic appointment was recommended for discussion of appropriate CPAP settings and reinstitution of treatment.    4.  Bruxism.  I recommended dental clinic evaluation for discussion of management, suggesting that an oral device might be the preferred initial treatment.    5.  Other.  Elevated lipase level has improved to near normal level of 408 following an elevated reading of 554 at recent emergency center visit.  Etiology is unclear but with unchanged pattern of symptoms, normal amylase and recent normal CT, suspicion for pancreatitis and peptic ulcer disease is low.  Venous blood gases and normal lactate level suggests that overall picture is not suggestive of lactic acidosis.  Discontinuation of metformin is recommended nevertheless based on correlation of abdominal discomfort with institution of metformin  treatment.    PLAN:  See above.    Total time was 40 minutes.  Counseling time was 25 minutes.  We discussed potential causes of his symptoms, proposed changes in medication, the importance of close blood glucose monitoring and clinic follow-up, symptoms that should prompt immediate further evaluation, and details of plan of care.     Please note that the above medical document was created with use of speech recognition software and may contain typographical errors.

## 2018-01-25 ENCOUNTER — OFFICE VISIT (OUTPATIENT)
Dept: PHARMACY | Facility: CLINIC | Age: 53
End: 2018-01-25

## 2018-01-25 DIAGNOSIS — E11.9 TYPE 2 DIABETES MELLITUS WITHOUT COMPLICATION, WITHOUT LONG-TERM CURRENT USE OF INSULIN (H): Primary | ICD-10-CM

## 2018-01-25 PROCEDURE — 99207 ZZC NO CHARGE LOS: CPT | Performed by: PHARMACIST

## 2018-01-25 NOTE — MR AVS SNAPSHOT
After Visit Summary   1/25/2018    Andrew Lockwood    MRN: [id number]           Patient Information     Date Of Birth          11/27/1967        Visit Information        Provider Department      1/25/2018 12:30 PM Keesha Zelaya [facility]        Today's Diagnoses     Type 2 diabetes mellitus without complication, without long-term current use of insulin (H)    -  1       Follow-ups after your visit        Your next 10 appointments already scheduled     Jan 31, 2018  2:05 PM CST   (Arrive by 1:50 PM)   Return Visit with Chevy Reynoso MD   [facility] ([facility])    [address]  4th Floor  [facility] [address] [phone]            Apr 06, 2018  8:30 AM CDT   (Arrive by 8:15 AM)   Return Visit with Jose Ramon Singh MD   [facility] ([facility])    [address]  3rd Floor  [facility] [address] [phone]              Who to contact     If you have questions or need follow up information about today's clinic visit or your schedule please contact [facility] directly at [phone].  Normal or non-critical lab and imaging results will be communicated to you by MyChart, letter or phone within 4 business days after the clinic has received the results. If you do not hear from us within 7 days, please contact the clinic through [employer] or phone. If you have a critical or abnormal lab result, we will notify you by phone as soon as possible.  Submit refill requests through [employer] or call your pharmacy and they will forward the refill request to us. Please allow 3 business days for your refill to be completed.          Additional Information About Your Visit        [employer] Information     [employer] gives you secure access to your electronic health record. If you see a primary care provider, you can also send  messages to your care team and make appointments. If you have questions, please call your primary care clinic.  If you do not have a primary care provider, please call [phone] and they will assist you.        Care EveryWhere ID     This is your Care EveryWhere ID. This could be used by other organizations to access your [facility] medical records  [id number]         Blood Pressure from Last 3 Encounters:   01/24/18 124/79   01/24/18 125/88   01/03/18 115/82    Weight from Last 3 Encounters:   01/24/18 178 lb 1.6 oz (80.8 kg)   01/23/18 170 lb (77.1 kg)   01/17/18 170 lb (77.1 kg)              Today, you had the following     No orders found for display         Today's Medication Changes          These changes are accurate as of 1/25/18  3:07 PM.  If you have any questions, ask your nurse or doctor.               These medicines have changed or have updated prescriptions.        Dose/Directions    sulfamethoxazole-trimethoprim 800-160 MG per tablet   Commonly known as:  BACTRIM DS/SEPTRA DS   This may have changed:    - how much to take  - when to take this  - additional instructions   Used for:  Toxoplasma encephalitis (H), Human immunodeficiency virus (HIV) disease        TAKE 1 TABLET BY MOUTH 2 TIMES DAILY   Quantity:  60 tablet   Refills:  5                Primary Care Provider Office Phone # Fax #    Shanta Munoz [phone]       [address] [address]        Equal Access to Services     RA JORDAN AH: Hadii carissa stephens Soterence, waaxda luqadaha, qaybta kaalmada axel, dez salazar. So [facility] [place] [phone].    ATENCIÓN: Si habla español, tiene a kohli disposición servicios gratuitos de asistencia lingüística. Llame al [phone].    We comply with applicable federal civil rights laws and [place] laws. We do not discriminate on the basis of race, color, national origin, age, disability, sex, sexual orientation, or gender  identity.            Thank you!     Thank you for choosing [facility] AND [facility]  for your care. Our goal is always to provide you with excellent care. Hearing back from our patients is one way we can continue to improve our services. Please take a few minutes to complete the written survey that you may receive in the mail after your visit with us. Thank you!             Your Updated Medication List - Protect others around you: Learn how to safely use, store and throw away your medicines at www.disposemymeds.org.          This list is accurate as of 1/25/18  3:07 PM.  Always use your most recent med list.                   Brand Name Dispense Instructions for use Diagnosis    AZITHROMYCIN PO      Take 600 mg by mouth Take 2 tablets every Sunday.        blood glucose lancets standard    no brand specified    100 Box    Use to test blood sugar four times daily or as directed.    Type 2 diabetes mellitus without complication, without long-term current use of insulin (H)       * blood glucose monitoring meter device kit    no brand specified    1 kit    Use to test blood sugar four times daily or as directed.    Type 2 diabetes mellitus without complication, without long-term current use of insulin (H)       * blood glucose monitoring meter device kit    no brand specified    1 kit    Use to test blood sugar 4 times daily or as directed.    Type 2 diabetes mellitus with complication, without long-term current use of insulin (H)       * blood glucose monitoring test strip    no brand specified    100 each    Use to test blood sugars four times daily or as directed    Type 2 diabetes mellitus without complication, without long-term current use of insulin (H)       * blood glucose monitoring test strip    no brand specified    100 strip    1 strip by In Vitro route 4 times daily (before meals and nightly) Use to test blood sugars 4 times daily or as directed    Type 2 diabetes mellitus with  complication, without long-term current use of insulin (H)       DICYCLOMINE HCL PO      Take 10 mg by mouth 4 times daily        Wcteiju-Wrtzp-Vjgwydua-TenofAF [id number] MG Tabs per tablet    GENVOYA    30 tablet    Take 1 tablet by mouth daily    Human immunodeficiency virus (HIV) disease       gabapentin 300 MG capsule    NEURONTIN    90 capsule    Take 1 capsule (300 mg) by mouth 3 times daily    Herpes zoster without complication       glipiZIDE 5 MG tablet    GLUCOTROL    30 tablet    Take 0.5-1 tablets (2.5-5 mg) by mouth daily    Type 2 diabetes mellitus with complication, without long-term current use of insulin (H)       montelukast 10 MG tablet    SINGULAIR     Take 10 mg by mouth At Bedtime        pseudoePHEDrine 30 MG tablet    SUDAFED    112 tablet    Take 2 tablets (60 mg) by mouth every 6 hours as needed for congestion        sulfamethoxazole-trimethoprim 800-160 MG per tablet    BACTRIM DS/SEPTRA DS    60 tablet    TAKE 1 TABLET BY MOUTH 2 TIMES DAILY    Toxoplasma encephalitis (H), Human immunodeficiency virus (HIV) disease       * Notice:  This list has 4 medication(s) that are the same as other medications prescribed for you. Read the directions carefully, and ask your doctor or other care provider to review them with you.

## 2018-01-25 NOTE — PROGRESS NOTES
Clinical Consult:                                                    Andrew Lockwood is a 50 year old male coming in for a clinical pharmacist consult.  He was referred to me from Dr. Rosado.     Reason for Consult: Review glipizide with pt. Discontinue some meds per Dr. Rosado.    Discussion: Andrew reports he was asked to discontinue Metformin and start taking Glypizide. He would like to know the difference between these to medications.     Plan:  1. Reviewed difference between Metformin and Glipizide.  2. Per Dr. Rosado, asked patient to stop taking azithromycin and bactrim.  3. Called pharmacy, as they fill med boxes for him, and asked them to discontinue azithromycin and bactrim.    Pt is asked to call with any questions/concerns.    Keesha Zelaya, [facility] Pharmacist.   [phone]

## 2018-01-28 ENCOUNTER — HOSPITAL ENCOUNTER (EMERGENCY)
Facility: CLINIC | Age: 53
Discharge: HOME | End: 2018-01-28
Attending: EMERGENCY MEDICINE | Admitting: EMERGENCY MEDICINE
Payer: COMMERCIAL

## 2018-01-28 VITALS
SYSTOLIC BLOOD PRESSURE: 109 MMHG | OXYGEN SATURATION: 98 % | HEIGHT: 65 IN | TEMPERATURE: 97.9 F | HEART RATE: 98 BPM | BODY MASS INDEX: 29.36 KG/M2 | RESPIRATION RATE: 20 BRPM | DIASTOLIC BLOOD PRESSURE: 76 MMHG | WEIGHT: 176.2 LBS

## 2018-01-28 DIAGNOSIS — G89.29 CHRONIC PAIN OF LEFT KNEE: ICD-10-CM

## 2018-01-28 DIAGNOSIS — M25.562 CHRONIC PAIN OF LEFT KNEE: ICD-10-CM

## 2018-01-28 LAB — GLUCOSE BLDC GLUCOMTR-MCNC: 369 MG/DL (ref 70–99)

## 2018-01-28 PROCEDURE — 00000146 ZZHCL STATISTIC GLUCOSE BY METER IP

## 2018-01-28 PROCEDURE — 25000132 ZZH RX MED GY IP 250 OP 250 PS 637: Performed by: EMERGENCY MEDICINE

## 2018-01-28 PROCEDURE — 99283 EMERGENCY DEPT VISIT LOW MDM: CPT | Mod: Z6 | Performed by: EMERGENCY MEDICINE

## 2018-01-28 PROCEDURE — 99284 EMERGENCY DEPT VISIT MOD MDM: CPT | Performed by: EMERGENCY MEDICINE

## 2018-01-28 RX ORDER — TRAMADOL HYDROCHLORIDE 50 MG/1
50 TABLET ORAL ONCE
Status: COMPLETED | OUTPATIENT
Start: 2018-01-28 | End: 2018-01-28

## 2018-01-28 RX ADMIN — TRAMADOL HYDROCHLORIDE 50 MG: 50 TABLET, COATED ORAL at 03:53

## 2018-01-28 NOTE — DISCHARGE INSTRUCTIONS
Please make an appointment to follow up with Your Primary Care Provider as soon as possible if not improving.      Knee Pain  Knee pain is very common. It s especially common in active people who put a lot of pressure on their knees, like runners. It affects women more often than men.  Your kneecap (patella) is a thick, round bone. It covers and protects the front portion of your knee joint. It moves along a groove in your thighbone (femur) as part of the patellofemoral joint. A layer of cartilage surrounds the underside of your kneecap. This layer protects it from grinding against your femur.  When this cartilage softens and breaks down, it can cause knee pain. This is partly because of repetitive stress. The stress irritates the lining of the joint. This causes pain in the underlying bone.  What causes knee pain?  Many things can cause knee pain. You may have more than one cause. Some of these include:    Overuse of the knee joint    The kneecap doesn t line up with the tissue around it    Damage to small nerves in the area    Damage to the ligament-like structure that holds the kneecap in place (retinaculum)    Breakdown of the bone under the cartilage    Swelling in the soft tissues around the kneecap    Injury  You might be more likely to have knee pain if you:    Exercise a lot    Recently increased the intensity of your workouts    Have a body mass index (BMI) greater than 25    Have poor alignment of your kneecap    Walk with your feet turned overly outward or inward    Have weakness in surrounding muscle groups (inner quad or hip adductor muscles)    Have too much tightness in surrounding muscle groups (hamstrings or iliotibial band)    Have a recent history of injury to the area    Are female  Symptoms of knee pain  This type of knee pain is a dull, aching pain in the front of the knee in the area under and around the kneecap. This pain may start quickly or slowly. Your pain might be worse when you squat,  run, or sit for a long time. You might also sometimes feel like your knee is giving out. You may have symptoms in one or both of your knees.  Diagnosing knee pain  Your healthcare provider will ask about your medical history and your symptoms. Be sure to describe any activities that make your knee pain worse. He or she will look at your knee. This will include tests of your range of motion, strength, and areas of pain of your knee. Your knee alignment will be checked.  Your healthcare provider will need to rule out other causes of your knee pain, such as arthritis. You may need an imaging test, such as an X-ray or MRI.  Treatment for knee pain  Treatments that can help ease your symptoms may include:    Avoiding activities for a while that make your pain worse, returning to activity over time    Icing the outside of your knee when it causes you pain    Taking over-the-counter pain medicine    Wearing a knee brace or taping your knee to support it    Wearing special shoe inserts to help keep your feet in the proper alignment    Doing special exercises to stretch and strengthen the muscles around your hip and your knee  These steps help most people manage knee pain. But some cases of knee pain need to be treated with surgery. You may need surgery right away. Or you may need it later if other treatments don t work. Your healthcare provider may refer you to an orthopedic surgeon. He or she will talk with you about your choices.  Preventing knee pain  Losing weight and correcting excess muscle tightness or muscle weakness may help lower your risk.  In some cases, you can prevent knee pain. To help prevent a flare-up of knee pain, you do these things:    Regularly do all the exercises your doctor or physical therapist advises    Support your knee as advised by your doctor or physical therapist    Increase training gradually, and ease up on training when needed    Have an expert check your gait for running or other  sporting activities    Stretch properly before and after exercise    Replace your running shoes regularly    Lose excess weight     When to call your healthcare provider  Call your healthcare provider right away if:    Your symptoms don t get better after a few weeks of treatment    You have any new symptoms   Date Last Reviewed: 4/1/2017 2000-2017 [employer]. [address] [place] [address], [place] [address]. All rights reserved. This information is not intended as a substitute for professional medical care. Always follow your healthcare professional's instructions.

## 2018-01-28 NOTE — ED AVS SNAPSHOT
[facility], [place], Emergency Department    [address] [place] [address]    Phone:  [phone]                                       Andrew Lockwood   MRN: [id number]    Department:  [facility], Emergency Department   Date of Visit:  1/28/2018           After Visit Summary Signature Page     I have received my discharge instructions, and my questions have been answered. I have discussed any challenges I see with this plan with the nurse or doctor.    ..........................................................................................................................................  Patient/Patient Representative Signature      ..........................................................................................................................................  Patient Representative Print Name and Relationship to Patient    ..................................................               ................................................  Date                                            Time    ..........................................................................................................................................  Reviewed by Signature/Title    ...................................................              ..............................................  Date                                                            Time

## 2018-01-28 NOTE — ED NOTES
"Pt presents ambulatory to triage with c/o left knee pain that has been going on for 3 years but reports he got a \"shot\" in his knee 2 weeks ago, but it has not helped with the pain.   "

## 2018-01-28 NOTE — ED AVS SNAPSHOT
[facility], Emergency Department    [address] [address]    Phone:  [phone]                                       Andrew Lockwood   MRN: [id number]    Department:  [facility], Emergency Department   Date of Visit:  1/28/2018           Patient Information     Date Of Birth          11/27/1967        Your diagnoses for this visit were:     Chronic pain of left knee        You were seen by Angelo Estes MD.        Discharge Instructions       Please make an appointment to follow up with Your Primary Care Provider as soon as possible if not improving.      Knee Pain  Knee pain is very common. It s especially common in active people who put a lot of pressure on their knees, like runners. It affects women more often than men.  Your kneecap (patella) is a thick, round bone. It covers and protects the front portion of your knee joint. It moves along a groove in your thighbone (femur) as part of the patellofemoral joint. A layer of cartilage surrounds the underside of your kneecap. This layer protects it from grinding against your femur.  When this cartilage softens and breaks down, it can cause knee pain. This is partly because of repetitive stress. The stress irritates the lining of the joint. This causes pain in the underlying bone.  What causes knee pain?  Many things can cause knee pain. You may have more than one cause. Some of these include:    Overuse of the knee joint    The kneecap doesn t line up with the tissue around it    Damage to small nerves in the area    Damage to the ligament-like structure that holds the kneecap in place (retinaculum)    Breakdown of the bone under the cartilage    Swelling in the soft tissues around the kneecap    Injury  You might be more likely to have knee pain if you:    Exercise a lot    Recently increased the intensity of your workouts    Have a body mass index (BMI) greater than 25    Have poor alignment of your kneecap    Walk with your feet  turned overly outward or inward    Have weakness in surrounding muscle groups (inner quad or hip adductor muscles)    Have too much tightness in surrounding muscle groups (hamstrings or iliotibial band)    Have a recent history of injury to the area    Are female  Symptoms of knee pain  This type of knee pain is a dull, aching pain in the front of the knee in the area under and around the kneecap. This pain may start quickly or slowly. Your pain might be worse when you squat, run, or sit for a long time. You might also sometimes feel like your knee is giving out. You may have symptoms in one or both of your knees.  Diagnosing knee pain  Your healthcare provider will ask about your medical history and your symptoms. Be sure to describe any activities that make your knee pain worse. He or she will look at your knee. This will include tests of your range of motion, strength, and areas of pain of your knee. Your knee alignment will be checked.  Your healthcare provider will need to rule out other causes of your knee pain, such as arthritis. You may need an imaging test, such as an X-ray or MRI.  Treatment for knee pain  Treatments that can help ease your symptoms may include:    Avoiding activities for a while that make your pain worse, returning to activity over time    Icing the outside of your knee when it causes you pain    Taking over-the-counter pain medicine    Wearing a knee brace or taping your knee to support it    Wearing special shoe inserts to help keep your feet in the proper alignment    Doing special exercises to stretch and strengthen the muscles around your hip and your knee  These steps help most people manage knee pain. But some cases of knee pain need to be treated with surgery. You may need surgery right away. Or you may need it later if other treatments don t work. Your healthcare provider may refer you to an orthopedic surgeon. He or she will talk with you about your choices.  Preventing knee  pain  Losing weight and correcting excess muscle tightness or muscle weakness may help lower your risk.  In some cases, you can prevent knee pain. To help prevent a flare-up of knee pain, you do these things:    Regularly do all the exercises your doctor or physical therapist advises    Support your knee as advised by your doctor or physical therapist    Increase training gradually, and ease up on training when needed    Have an expert check your gait for running or other sporting activities    Stretch properly before and after exercise    Replace your running shoes regularly    Lose excess weight     When to call your healthcare provider  Call your healthcare provider right away if:    Your symptoms don t get better after a few weeks of treatment    You have any new symptoms   Date Last Reviewed: 4/1/2017 2000-2017 [employer]. [address] [place] [address], [place] [id number]. All rights reserved. This information is not intended as a substitute for professional medical care. Always follow your healthcare professional's instructions.            Future Appointments        Provider Department Dept Phone Center    1/31/2018 2:05 PM Chevy Reynoso MD [facility] [phone] [facility]    4/6/2018 8:30 AM Jose Ramon Singh MD [facility] Dermatology [phone] [facility]      24 Hour Appointment Hotline       To make an appointment at any [facility], call [phone] ([phone]). If you don't have a family doctor or clinic, we will help you find one. [facility] are conveniently located to serve the needs of you and your family.             Review of your medicines      Our records show that you are taking the medicines listed below. If these are incorrect, please call your family doctor or clinic.        Dose / Directions Last dose taken    AZITHROMYCIN PO   Dose:  600 mg        Take 600 mg by mouth Take 2 tablets every Sunday.   Refills:  0        blood glucose lancets standard    Commonly known as:  no brand specified   Quantity:  100 Box        Use to test blood sugar four times daily or as directed.   Refills:  5        * blood glucose monitoring meter device kit   Commonly known as:  no brand specified   Quantity:  1 kit        Use to test blood sugar four times daily or as directed.   Refills:  0        * blood glucose monitoring meter device kit   Commonly known as:  no brand specified   Quantity:  1 kit        Use to test blood sugar 4 times daily or as directed.   Refills:  0        * blood glucose monitoring test strip   Commonly known as:  no brand specified   Quantity:  100 each        Use to test blood sugars four times daily or as directed   Refills:  5        * blood glucose monitoring test strip   Commonly known as:  no brand specified   Dose:  1 strip   Quantity:  100 strip        1 strip by In Vitro route 4 times daily (before meals and nightly) Use to test blood sugars 4 times daily or as directed   Refills:  11        DICYCLOMINE HCL PO   Dose:  10 mg        Take 10 mg by mouth 4 times daily   Refills:  0        Kfqrvoj-Deljn-Mwakoswy-TenofAF 546-319-414-10 MG Tabs per tablet   Commonly known as:  GENVOYA   Dose:  1 tablet   Quantity:  30 tablet        Take 1 tablet by mouth daily   Refills:  5        gabapentin 300 MG capsule   Commonly known as:  NEURONTIN   Dose:  300 mg   Quantity:  90 capsule        Take 1 capsule (300 mg) by mouth 3 times daily   Refills:  5        glipiZIDE 5 MG tablet   Commonly known as:  GLUCOTROL   Dose:  2.5-5 mg   Quantity:  30 tablet        Take 0.5-1 tablets (2.5-5 mg) by mouth daily   Refills:  11        montelukast 10 MG tablet   Commonly known as:  SINGULAIR   Dose:  10 mg        Take 10 mg by mouth At Bedtime   Refills:  0        pseudoePHEDrine 30 MG tablet   Commonly known as:  SUDAFED   Dose:  60 mg   Quantity:  112 tablet        Take 2 tablets (60 mg) by mouth every 6 hours as needed for congestion   Refills:  0         sulfamethoxazole-trimethoprim 800-160 MG per tablet   Commonly known as:  BACTRIM DS/SEPTRA DS   Quantity:  60 tablet        TAKE 1 TABLET BY MOUTH 2 TIMES DAILY   Refills:  5        * Notice:  This list has 4 medication(s) that are the same as other medications prescribed for you. Read the directions carefully, and ask your doctor or other care provider to review them with you.            Orders Needing Specimen Collection     None      Pending Results     No orders found from 1/26/2018 to 1/29/2018.            Pending Culture Results     No orders found from 1/26/2018 to 1/29/2018.            Pending Results Instructions     If you had any lab results that were not finalized at the time of your Discharge, you can call the ED Lab Result RN at [phone]. You will be contacted by this team for any positive Lab results or changes in treatment. The nurses are available 7 days a week from 10A to 6:30P.  You can leave a message 24 hours per day and they will return your call.        Thank you for choosing [facility]       Thank you for choosing [facility] for your care. Our goal is always to provide you with excellent care. Hearing back from our patients is one way we can continue to improve our services. Please take a few minutes to complete the written survey that you may receive in the mail after you visit with us. Thank you!        [employer] Information     [employer] gives you secure access to your electronic health record. If you see a primary care provider, you can also send messages to your care team and make appointments. If you have questions, please call your primary care clinic.  If you do not have a primary care provider, please call [phone] and they will assist you.        Care EveryWhere ID     This is your Care EveryWhere ID. This could be used by other organizations to access your [facility] medical records  [id number]        Equal Access to Services     RA JORDAN AH: Apollo Cody  maverick centeno, dez jackson. So [facility] [phone].    ATENCIÓN: Si habla español, tiene a kohli disposición servicios gratuitos de asistencia lingüística. Llame al [phone].    We comply with applicable federal civil rights laws and [place] laws. We do not discriminate on the basis of race, color, national origin, age, disability, sex, sexual orientation, or gender identity.            After Visit Summary       This is your record. Keep this with you and show to your community pharmacist(s) and doctor(s) at your next visit.

## 2018-01-30 ENCOUNTER — APPOINTMENT (OUTPATIENT)
Dept: GENERAL RADIOLOGY | Facility: CLINIC | Age: 53
End: 2018-01-30
Attending: EMERGENCY MEDICINE
Payer: COMMERCIAL

## 2018-01-30 ENCOUNTER — HOSPITAL ENCOUNTER (EMERGENCY)
Facility: CLINIC | Age: 53
Discharge: HOME | End: 2018-01-30
Attending: EMERGENCY MEDICINE | Admitting: EMERGENCY MEDICINE
Payer: COMMERCIAL

## 2018-01-30 VITALS
TEMPERATURE: 98 F | OXYGEN SATURATION: 99 % | DIASTOLIC BLOOD PRESSURE: 97 MMHG | BODY MASS INDEX: 28.32 KG/M2 | HEIGHT: 65 IN | WEIGHT: 170 LBS | SYSTOLIC BLOOD PRESSURE: 143 MMHG | RESPIRATION RATE: 17 BRPM

## 2018-01-30 DIAGNOSIS — K59.00 CONSTIPATION, UNSPECIFIED CONSTIPATION TYPE: ICD-10-CM

## 2018-01-30 LAB
ALBUMIN SERPL-MCNC: 3.8 G/DL (ref 3.4–5)
ALP SERPL-CCNC: 106 U/L (ref 40–150)
ALT SERPL W P-5'-P-CCNC: 35 U/L (ref 0–70)
ANION GAP SERPL CALCULATED.3IONS-SCNC: 9 MMOL/L (ref 3–14)
AST SERPL W P-5'-P-CCNC: 22 U/L (ref 0–45)
BASOPHILS # BLD AUTO: 0 10E9/L (ref 0–0.2)
BASOPHILS NFR BLD AUTO: 0.1 %
BILIRUB SERPL-MCNC: 0.4 MG/DL (ref 0.2–1.3)
BUN SERPL-MCNC: 15 MG/DL (ref 7–30)
CALCIUM SERPL-MCNC: 9.2 MG/DL (ref 8.5–10.1)
CHLORIDE SERPL-SCNC: 105 MMOL/L (ref 94–109)
CO2 SERPL-SCNC: 24 MMOL/L (ref 20–32)
CREAT SERPL-MCNC: 0.79 MG/DL (ref 0.66–1.25)
DIFFERENTIAL METHOD BLD: ABNORMAL
EOSINOPHIL # BLD AUTO: 0.1 10E9/L (ref 0–0.7)
EOSINOPHIL NFR BLD AUTO: 0.8 %
ERYTHROCYTE [DISTWIDTH] IN BLOOD BY AUTOMATED COUNT: 13.3 % (ref 10–15)
GFR SERPL CREATININE-BSD FRML MDRD: >90 ML/MIN/1.7M2
GLUCOSE SERPL-MCNC: 159 MG/DL (ref 70–99)
HCT VFR BLD AUTO: 40.9 % (ref 40–53)
HGB BLD-MCNC: 14 G/DL (ref 13.3–17.7)
IMM GRANULOCYTES # BLD: 0.1 10E9/L (ref 0–0.4)
IMM GRANULOCYTES NFR BLD: 0.5 %
LIPASE SERPL-CCNC: 323 U/L (ref 73–393)
LYMPHOCYTES # BLD AUTO: 2.7 10E9/L (ref 0.8–5.3)
LYMPHOCYTES NFR BLD AUTO: 16.6 %
MCH RBC QN AUTO: 32.2 PG (ref 26.5–33)
MCHC RBC AUTO-ENTMCNC: 34.2 G/DL (ref 31.5–36.5)
MCV RBC AUTO: 94 FL (ref 78–100)
MONOCYTES # BLD AUTO: 1 10E9/L (ref 0–1.3)
MONOCYTES NFR BLD AUTO: 6 %
NEUTROPHILS # BLD AUTO: 12.6 10E9/L (ref 1.6–8.3)
NEUTROPHILS NFR BLD AUTO: 76 %
NRBC # BLD AUTO: 0 10*3/UL
NRBC BLD AUTO-RTO: 0 /100
PLATELET # BLD AUTO: 234 10E9/L (ref 150–450)
POTASSIUM SERPL-SCNC: 3.6 MMOL/L (ref 3.4–5.3)
PROT SERPL-MCNC: 7.5 G/DL (ref 6.8–8.8)
RBC # BLD AUTO: 4.35 10E12/L (ref 4.4–5.9)
SODIUM SERPL-SCNC: 138 MMOL/L (ref 133–144)
WBC # BLD AUTO: 16.5 10E9/L (ref 4–11)

## 2018-01-30 PROCEDURE — 25000125 ZZHC RX 250: Performed by: EMERGENCY MEDICINE

## 2018-01-30 PROCEDURE — 25000132 ZZH RX MED GY IP 250 OP 250 PS 637: Performed by: EMERGENCY MEDICINE

## 2018-01-30 PROCEDURE — 99284 EMERGENCY DEPT VISIT MOD MDM: CPT | Mod: Z6 | Performed by: EMERGENCY MEDICINE

## 2018-01-30 PROCEDURE — 85025 COMPLETE CBC W/AUTO DIFF WBC: CPT | Performed by: EMERGENCY MEDICINE

## 2018-01-30 PROCEDURE — 96374 THER/PROPH/DIAG INJ IV PUSH: CPT | Performed by: EMERGENCY MEDICINE

## 2018-01-30 PROCEDURE — 80053 COMPREHEN METABOLIC PANEL: CPT | Performed by: EMERGENCY MEDICINE

## 2018-01-30 PROCEDURE — 83690 ASSAY OF LIPASE: CPT | Performed by: EMERGENCY MEDICINE

## 2018-01-30 PROCEDURE — 99285 EMERGENCY DEPT VISIT HI MDM: CPT | Mod: 25 | Performed by: EMERGENCY MEDICINE

## 2018-01-30 PROCEDURE — 74019 RADEX ABDOMEN 2 VIEWS: CPT

## 2018-01-30 PROCEDURE — 25000128 H RX IP 250 OP 636: Performed by: EMERGENCY MEDICINE

## 2018-01-30 RX ORDER — ALUMINA, MAGNESIA, AND SIMETHICONE 2400; 2400; 240 MG/30ML; MG/30ML; MG/30ML
30 SUSPENSION ORAL EVERY 4 HOURS PRN
Qty: 355 ML | Refills: 0 | Status: SHIPPED | OUTPATIENT
Start: 2018-01-30 | End: 2018-04-26

## 2018-01-30 RX ORDER — MAGNESIUM CARB/ALUMINUM HYDROX 105-160MG
30 TABLET,CHEWABLE ORAL ONCE
Status: COMPLETED | OUTPATIENT
Start: 2018-01-30 | End: 2018-01-30

## 2018-01-30 RX ORDER — HYDROMORPHONE HYDROCHLORIDE 1 MG/ML
0.5 INJECTION, SOLUTION INTRAMUSCULAR; INTRAVENOUS; SUBCUTANEOUS ONCE
Status: COMPLETED | OUTPATIENT
Start: 2018-01-30 | End: 2018-01-30

## 2018-01-30 RX ORDER — POLYETHYLENE GLYCOL 3350 17 G/17G
1 POWDER, FOR SOLUTION ORAL DAILY
Qty: 527 G | Refills: 0 | Status: SHIPPED | OUTPATIENT
Start: 2018-01-30 | End: 2018-01-31

## 2018-01-30 RX ORDER — MAGNESIUM CARB/ALUMINUM HYDROX 105-160MG
296 TABLET,CHEWABLE ORAL ONCE
Status: COMPLETED | OUTPATIENT
Start: 2018-01-30 | End: 2018-01-30

## 2018-01-30 RX ADMIN — Medication 0.5 MG: at 20:54

## 2018-01-30 RX ADMIN — MAGNESIUM CITRATE 296 ML: 1.75 LIQUID ORAL at 22:08

## 2018-01-30 RX ADMIN — LIDOCAINE HYDROCHLORIDE 30 ML: 20 SOLUTION ORAL; TOPICAL at 20:57

## 2018-01-30 RX ADMIN — MINERAL OIL 30 ML: 1000 SOLUTION ORAL at 22:08

## 2018-01-30 NOTE — ED AVS SNAPSHOT
[facility], [place], Emergency Department    [address] [place] [address]    Phone:  [phone]                                       Andrew Lockwood   MRN: [id number]    Department:  [facility], Emergency Department   Date of Visit:  1/30/2018           After Visit Summary Signature Page     I have received my discharge instructions, and my questions have been answered. I have discussed any challenges I see with this plan with the nurse or doctor.    ..........................................................................................................................................  Patient/Patient Representative Signature      ..........................................................................................................................................  Patient Representative Print Name and Relationship to Patient    ..................................................               ................................................  Date                                            Time    ..........................................................................................................................................  Reviewed by Signature/Title    ...................................................              ..............................................  Date                                                            Time

## 2018-01-30 NOTE — ED AVS SNAPSHOT
[facility], Emergency Department    [address] [facility] [address]    Phone:  [phone]                                       Andrew Lockwood   MRN: [id number]    Department:  [facility], Emergency Department   Date of Visit:  1/30/2018           Patient Information     Date Of Birth          11/27/1967        Your diagnoses for this visit were:     Constipation, unspecified constipation type        You were seen by Torres Saenz MD.        Discharge Instructions       Please make an appointment to follow up with Your Primary Care Provider as soon as possible if you have any concerns.      Discharge References/Attachments     CONSTIPATION (ADULT) (ENGLISH)      Future Appointments        Provider Department Dept Phone Center    1/31/2018 2:05 PM Chevy Reynoso MD [facility] Primary Care Clinic [phone] [facility]    2/20/2018 3:20 PM Aguila Ingram MD [facility] Orthopaedic [facility] [phone] [facility]    3/9/2018 10:30 AM Evin Zamorano MD [facility] Orthopaedic Clinic [phone] [facility]    4/6/2018 8:30 AM Jose Ramon Singh MD [facility] Dermatology [phone] [facility]      24 Hour Appointment Hotline       To make an appointment at any [facility], call [phone] ([phone]). If you don't have a family doctor or clinic, we will help you find one. [facility] clinics are conveniently located to serve the needs of you and your family.             Review of your medicines      START taking        Dose / Directions Last dose taken    omeprazole 20 MG CR capsule   Commonly known as:  priLOSEC   Dose:  20 mg   Quantity:  30 capsule        Take 1 capsule (20 mg) by mouth daily   Refills:  0        polyethylene glycol powder   Commonly known as:  MIRALAX   Dose:  1 capful   Quantity:  527 g        Take 17 g (1 capful) by mouth daily   Refills:  0          Our records show that you are taking the medicines listed below. If these are incorrect, please call your family doctor or  clinic.        Dose / Directions Last dose taken    AZITHROMYCIN PO   Dose:  600 mg        Take 600 mg by mouth Take 2 tablets every Sunday.   Refills:  0        blood glucose lancets standard   Commonly known as:  no brand specified   Quantity:  100 Box        Use to test blood sugar four times daily or as directed.   Refills:  5        * blood glucose monitoring meter device kit   Commonly known as:  no brand specified   Quantity:  1 kit        Use to test blood sugar four times daily or as directed.   Refills:  0        * blood glucose monitoring meter device kit   Commonly known as:  no brand specified   Quantity:  1 kit        Use to test blood sugar 4 times daily or as directed.   Refills:  0        * blood glucose monitoring test strip   Commonly known as:  no brand specified   Quantity:  100 each        Use to test blood sugars four times daily or as directed   Refills:  5        * blood glucose monitoring test strip   Commonly known as:  no brand specified   Dose:  1 strip   Quantity:  100 strip        1 strip by In Vitro route 4 times daily (before meals and nightly) Use to test blood sugars 4 times daily or as directed   Refills:  11        DICYCLOMINE HCL PO   Dose:  10 mg        Take 10 mg by mouth 4 times daily   Refills:  0        Pvzfvdi-Phklc-Oxywcffj-TenofAF [id number] MG Tabs per tablet   Commonly known as:  GENVOYA   Dose:  1 tablet   Quantity:  30 tablet        Take 1 tablet by mouth daily   Refills:  5        gabapentin 300 MG capsule   Commonly known as:  NEURONTIN   Dose:  300 mg   Quantity:  90 capsule        Take 1 capsule (300 mg) by mouth 3 times daily   Refills:  5        glipiZIDE 5 MG tablet   Commonly known as:  GLUCOTROL   Dose:  2.5-5 mg   Quantity:  30 tablet        Take 0.5-1 tablets (2.5-5 mg) by mouth daily   Refills:  11        montelukast 10 MG tablet   Commonly known as:  SINGULAIR   Dose:  10 mg        Take 10 mg by mouth At Bedtime   Refills:  0        pseudoePHEDrine  30 MG tablet   Commonly known as:  SUDAFED   Dose:  60 mg   Quantity:  112 tablet        Take 2 tablets (60 mg) by mouth every 6 hours as needed for congestion   Refills:  0        sulfamethoxazole-trimethoprim 800-160 MG per tablet   Commonly known as:  BACTRIM DS/SEPTRA DS   Quantity:  60 tablet        TAKE 1 TABLET BY MOUTH 2 TIMES DAILY   Refills:  5        * Notice:  This list has 4 medication(s) that are the same as other medications prescribed for you. Read the directions carefully, and ask your doctor or other care provider to review them with you.            Prescriptions were sent or printed at these locations (2 Prescriptions)                   Other Prescriptions                Printed at Department/Unit printer (2 of 2)         polyethylene glycol (MIRALAX) powder               omeprazole (PRILOSEC) 20 MG CR capsule                Procedures and tests performed during your visit     Abdomen XR, 2 vw, flat and upright    CBC with platelets differential    Comprehensive metabolic panel    Lipase      Orders Needing Specimen Collection     None      Pending Results     No orders found from 1/28/2018 to 1/31/2018.            Pending Culture Results     No orders found from 1/28/2018 to 1/31/2018.            Pending Results Instructions     If you had any lab results that were not finalized at the time of your Discharge, you can call the ED Lab Result RN at [phone]. You will be contacted by this team for any positive Lab results or changes in treatment. The nurses are available 7 days a week from 10A to 6:30P.  You can leave a message 24 hours per day and they will return your call.        Thank you for choosing [facility]       Thank you for choosing [facility] for your care. Our goal is always to provide you with excellent care. Hearing back from our patients is one way we can continue to improve our services. Please take a few minutes to complete the written survey that you may receive in the mail after  you visit with us. Thank you!        [employer] Information     [employer] gives you secure access to your electronic health record. If you see a primary care provider, you can also send messages to your care team and make appointments. If you have questions, please call your primary care clinic.  If you do not have a primary care provider, please call [phone] and they will assist you.        Care EveryWhere ID     This is your Care EveryWhere ID. This could be used by other organizations to access your [facility] medical records  [id number]        Equal Access to Services     RA JORDAN : Apollo Cody, warebecca centeno, luis enrique lezamaalmanan reyna, dez moreno . So [facility] [phone].    ATENCIÓN: Si habla español, tiene a kohli disposición servicios gratuitos de asistencia lingüística. Llame al [phone].    We comply with applicable federal civil rights laws and [place] laws. We do not discriminate on the basis of race, color, national origin, age, disability, sex, sexual orientation, or gender identity.            After Visit Summary       This is your record. Keep this with you and show to your community pharmacist(s) and doctor(s) at your next visit.

## 2018-01-31 ENCOUNTER — ANESTHESIA (OUTPATIENT)
Dept: SURGERY | Facility: CLINIC | Age: 53
End: 2018-01-31
Payer: COMMERCIAL

## 2018-01-31 ENCOUNTER — ANESTHESIA EVENT (OUTPATIENT)
Dept: SURGERY | Facility: CLINIC | Age: 53
End: 2018-01-31
Payer: COMMERCIAL

## 2018-01-31 ENCOUNTER — HOSPITAL ENCOUNTER (OUTPATIENT)
Facility: CLINIC | Age: 53
Setting detail: OBSERVATION
Discharge: HOME | End: 2018-02-02
Attending: EMERGENCY MEDICINE | Admitting: SURGERY
Payer: COMMERCIAL

## 2018-01-31 ENCOUNTER — SURGERY (OUTPATIENT)
Age: 53
End: 2018-01-31

## 2018-01-31 ENCOUNTER — APPOINTMENT (OUTPATIENT)
Dept: CT IMAGING | Facility: CLINIC | Age: 53
End: 2018-01-31
Attending: EMERGENCY MEDICINE
Payer: COMMERCIAL

## 2018-01-31 DIAGNOSIS — K35.80 ACUTE APPENDICITIS, UNSPECIFIED ACUTE APPENDICITIS TYPE: ICD-10-CM

## 2018-01-31 DIAGNOSIS — Z90.49 S/P APPENDECTOMY: Primary | ICD-10-CM

## 2018-01-31 PROBLEM — K35.30 ACUTE APPENDICITIS WITH LOCALIZED PERITONITIS: Status: ACTIVE | Noted: 2018-01-31

## 2018-01-31 LAB
ALBUMIN SERPL-MCNC: 3.5 G/DL (ref 3.4–5)
ALP SERPL-CCNC: 83 U/L (ref 40–150)
ALT SERPL W P-5'-P-CCNC: 30 U/L (ref 0–70)
ANION GAP SERPL CALCULATED.3IONS-SCNC: 8 MMOL/L (ref 3–14)
AST SERPL W P-5'-P-CCNC: 20 U/L (ref 0–45)
BASOPHILS # BLD AUTO: 0 10E9/L (ref 0–0.2)
BASOPHILS NFR BLD AUTO: 0.1 %
BILIRUB SERPL-MCNC: 1.2 MG/DL (ref 0.2–1.3)
BUN SERPL-MCNC: 12 MG/DL (ref 7–30)
CALCIUM SERPL-MCNC: 9 MG/DL (ref 8.5–10.1)
CHLORIDE SERPL-SCNC: 101 MMOL/L (ref 94–109)
CO2 SERPL-SCNC: 26 MMOL/L (ref 20–32)
CREAT SERPL-MCNC: 0.74 MG/DL (ref 0.66–1.25)
DIFFERENTIAL METHOD BLD: ABNORMAL
EOSINOPHIL # BLD AUTO: 0 10E9/L (ref 0–0.7)
EOSINOPHIL NFR BLD AUTO: 0.2 %
ERYTHROCYTE [DISTWIDTH] IN BLOOD BY AUTOMATED COUNT: 13.6 % (ref 10–15)
GFR SERPL CREATININE-BSD FRML MDRD: >90 ML/MIN/1.7M2
GLUCOSE BLDC GLUCOMTR-MCNC: 166 MG/DL (ref 70–99)
GLUCOSE BLDC GLUCOMTR-MCNC: 175 MG/DL (ref 70–99)
GLUCOSE SERPL-MCNC: 186 MG/DL (ref 70–99)
HCT VFR BLD AUTO: 40.1 % (ref 40–53)
HGB BLD-MCNC: 13.5 G/DL (ref 13.3–17.7)
IMM GRANULOCYTES # BLD: 0.1 10E9/L (ref 0–0.4)
IMM GRANULOCYTES NFR BLD: 0.4 %
LIPASE SERPL-CCNC: 150 U/L (ref 73–393)
LYMPHOCYTES # BLD AUTO: 2.2 10E9/L (ref 0.8–5.3)
LYMPHOCYTES NFR BLD AUTO: 12.9 %
MCH RBC QN AUTO: 32.1 PG (ref 26.5–33)
MCHC RBC AUTO-ENTMCNC: 33.7 G/DL (ref 31.5–36.5)
MCV RBC AUTO: 95 FL (ref 78–100)
MONOCYTES # BLD AUTO: 1.2 10E9/L (ref 0–1.3)
MONOCYTES NFR BLD AUTO: 7.2 %
NEUTROPHILS # BLD AUTO: 13.6 10E9/L (ref 1.6–8.3)
NEUTROPHILS NFR BLD AUTO: 79.2 %
NRBC # BLD AUTO: 0 10*3/UL
NRBC BLD AUTO-RTO: 0 /100
PLATELET # BLD AUTO: 189 10E9/L (ref 150–450)
POTASSIUM SERPL-SCNC: 3.8 MMOL/L (ref 3.4–5.3)
PROT SERPL-MCNC: 7.3 G/DL (ref 6.8–8.8)
RADIOLOGIST FLAGS: ABNORMAL
RBC # BLD AUTO: 4.21 10E12/L (ref 4.4–5.9)
RSV AG SPEC QL: POSITIVE
SODIUM SERPL-SCNC: 135 MMOL/L (ref 133–144)
SPECIMEN SOURCE: ABNORMAL
WBC # BLD AUTO: 17.1 10E9/L (ref 4–11)

## 2018-01-31 PROCEDURE — 96365 THER/PROPH/DIAG IV INF INIT: CPT | Performed by: EMERGENCY MEDICINE

## 2018-01-31 PROCEDURE — 36000057 ZZH SURGERY LEVEL 3 1ST 30 MIN - UMMC: Performed by: SURGERY

## 2018-01-31 PROCEDURE — 40000556 ZZH STATISTIC PERIPHERAL IV START W US GUIDANCE

## 2018-01-31 PROCEDURE — 85025 COMPLETE CBC W/AUTO DIFF WBC: CPT | Performed by: EMERGENCY MEDICINE

## 2018-01-31 PROCEDURE — 37000009 ZZH ANESTHESIA TECHNICAL FEE, EACH ADDTL 15 MIN: Performed by: SURGERY

## 2018-01-31 PROCEDURE — 82962 GLUCOSE BLOOD TEST: CPT | Mod: 91

## 2018-01-31 PROCEDURE — 37000008 ZZH ANESTHESIA TECHNICAL FEE, 1ST 30 MIN: Performed by: SURGERY

## 2018-01-31 PROCEDURE — 25000128 H RX IP 250 OP 636: Performed by: EMERGENCY MEDICINE

## 2018-01-31 PROCEDURE — 71000017 ZZH RECOVERY PHASE 1 LEVEL 3 EA ADDTL HR: Performed by: SURGERY

## 2018-01-31 PROCEDURE — 25000125 ZZHC RX 250: Performed by: NURSE ANESTHETIST, CERTIFIED REGISTERED

## 2018-01-31 PROCEDURE — 25000128 H RX IP 250 OP 636: Performed by: ANESTHESIOLOGY

## 2018-01-31 PROCEDURE — 25000128 H RX IP 250 OP 636: Performed by: NURSE ANESTHETIST, CERTIFIED REGISTERED

## 2018-01-31 PROCEDURE — 96376 TX/PRO/DX INJ SAME DRUG ADON: CPT | Performed by: EMERGENCY MEDICINE

## 2018-01-31 PROCEDURE — 88304 TISSUE EXAM BY PATHOLOGIST: CPT | Performed by: SURGERY

## 2018-01-31 PROCEDURE — 96375 TX/PRO/DX INJ NEW DRUG ADDON: CPT | Performed by: EMERGENCY MEDICINE

## 2018-01-31 PROCEDURE — 99285 EMERGENCY DEPT VISIT HI MDM: CPT | Mod: 25 | Performed by: EMERGENCY MEDICINE

## 2018-01-31 PROCEDURE — 25000566 ZZH SEVOFLURANE, EA 15 MIN: Performed by: SURGERY

## 2018-01-31 PROCEDURE — 80053 COMPREHEN METABOLIC PANEL: CPT | Performed by: EMERGENCY MEDICINE

## 2018-01-31 PROCEDURE — 25000125 ZZHC RX 250: Performed by: EMERGENCY MEDICINE

## 2018-01-31 PROCEDURE — 25000132 ZZH RX MED GY IP 250 OP 250 PS 637: Performed by: EMERGENCY MEDICINE

## 2018-01-31 PROCEDURE — 83690 ASSAY OF LIPASE: CPT | Performed by: EMERGENCY MEDICINE

## 2018-01-31 PROCEDURE — 25000128 H RX IP 250 OP 636: Performed by: RADIOLOGY

## 2018-01-31 PROCEDURE — C9399 UNCLASSIFIED DRUGS OR BIOLOG: HCPCS | Performed by: NURSE ANESTHETIST, CERTIFIED REGISTERED

## 2018-01-31 PROCEDURE — 40000171 ZZH STATISTIC PRE-PROCEDURE ASSESSMENT III: Performed by: SURGERY

## 2018-01-31 PROCEDURE — 99285 EMERGENCY DEPT VISIT HI MDM: CPT | Mod: Z6 | Performed by: EMERGENCY MEDICINE

## 2018-01-31 PROCEDURE — 96361 HYDRATE IV INFUSION ADD-ON: CPT | Performed by: EMERGENCY MEDICINE

## 2018-01-31 PROCEDURE — 27210794 ZZH OR GENERAL SUPPLY STERILE: Performed by: SURGERY

## 2018-01-31 PROCEDURE — 36000059 ZZH SURGERY LEVEL 3 EA 15 ADDTL MIN UMMC: Performed by: SURGERY

## 2018-01-31 PROCEDURE — 87807 RSV ASSAY W/OPTIC: CPT | Performed by: EMERGENCY MEDICINE

## 2018-01-31 PROCEDURE — 74177 CT ABD & PELVIS W/CONTRAST: CPT

## 2018-01-31 PROCEDURE — 25000125 ZZHC RX 250: Performed by: RADIOLOGY

## 2018-01-31 PROCEDURE — 96366 THER/PROPH/DIAG IV INF ADDON: CPT | Performed by: EMERGENCY MEDICINE

## 2018-01-31 PROCEDURE — 71000016 ZZH RECOVERY PHASE 1 LEVEL 3 FIRST HR: Performed by: SURGERY

## 2018-01-31 PROCEDURE — 25800025 ZZH RX 258: Performed by: SURGERY

## 2018-01-31 RX ORDER — SODIUM CHLORIDE, SODIUM LACTATE, POTASSIUM CHLORIDE, CALCIUM CHLORIDE 600; 310; 30; 20 MG/100ML; MG/100ML; MG/100ML; MG/100ML
INJECTION, SOLUTION INTRAVENOUS CONTINUOUS
Status: DISCONTINUED | OUTPATIENT
Start: 2018-01-31 | End: 2018-02-01

## 2018-01-31 RX ORDER — SODIUM CHLORIDE 9 MG/ML
INJECTION, SOLUTION INTRAVENOUS CONTINUOUS PRN
Status: DISCONTINUED | OUTPATIENT
Start: 2018-01-31 | End: 2018-01-31

## 2018-01-31 RX ORDER — ONDANSETRON 2 MG/ML
4 INJECTION INTRAMUSCULAR; INTRAVENOUS EVERY 30 MIN PRN
Status: DISCONTINUED | OUTPATIENT
Start: 2018-01-31 | End: 2018-02-01

## 2018-01-31 RX ORDER — ONDANSETRON 4 MG/1
4 TABLET, ORALLY DISINTEGRATING ORAL EVERY 30 MIN PRN
Status: DISCONTINUED | OUTPATIENT
Start: 2018-01-31 | End: 2018-02-01

## 2018-01-31 RX ORDER — FENTANYL CITRATE 50 UG/ML
INJECTION, SOLUTION INTRAMUSCULAR; INTRAVENOUS PRN
Status: DISCONTINUED | OUTPATIENT
Start: 2018-01-31 | End: 2018-01-31

## 2018-01-31 RX ORDER — PIPERACILLIN SODIUM, TAZOBACTAM SODIUM 2; .25 G/10ML; G/10ML
INJECTION, POWDER, LYOPHILIZED, FOR SOLUTION INTRAVENOUS PRN
Status: DISCONTINUED | OUTPATIENT
Start: 2018-01-31 | End: 2018-01-31

## 2018-01-31 RX ORDER — SODIUM CHLORIDE, SODIUM LACTATE, POTASSIUM CHLORIDE, CALCIUM CHLORIDE 600; 310; 30; 20 MG/100ML; MG/100ML; MG/100ML; MG/100ML
INJECTION, SOLUTION INTRAVENOUS CONTINUOUS
Status: DISCONTINUED | OUTPATIENT
Start: 2018-01-31 | End: 2018-01-31 | Stop reason: HOSPADM

## 2018-01-31 RX ORDER — ONDANSETRON 2 MG/ML
INJECTION INTRAMUSCULAR; INTRAVENOUS PRN
Status: DISCONTINUED | OUTPATIENT
Start: 2018-01-31 | End: 2018-01-31

## 2018-01-31 RX ORDER — PROPOFOL 10 MG/ML
INJECTION, EMULSION INTRAVENOUS PRN
Status: DISCONTINUED | OUTPATIENT
Start: 2018-01-31 | End: 2018-01-31

## 2018-01-31 RX ORDER — LIDOCAINE HYDROCHLORIDE 20 MG/ML
INJECTION, SOLUTION INFILTRATION; PERINEURAL PRN
Status: DISCONTINUED | OUTPATIENT
Start: 2018-01-31 | End: 2018-01-31

## 2018-01-31 RX ORDER — IOPAMIDOL 755 MG/ML
104 INJECTION, SOLUTION INTRAVASCULAR ONCE
Status: COMPLETED | OUTPATIENT
Start: 2018-01-31 | End: 2018-01-31

## 2018-01-31 RX ORDER — HYDROMORPHONE HYDROCHLORIDE 1 MG/ML
.3-.5 INJECTION, SOLUTION INTRAMUSCULAR; INTRAVENOUS; SUBCUTANEOUS EVERY 5 MIN PRN
Status: DISCONTINUED | OUTPATIENT
Start: 2018-01-31 | End: 2018-02-01

## 2018-01-31 RX ORDER — ONDANSETRON 2 MG/ML
4 INJECTION INTRAMUSCULAR; INTRAVENOUS ONCE
Status: COMPLETED | OUTPATIENT
Start: 2018-01-31 | End: 2018-01-31

## 2018-01-31 RX ORDER — FENTANYL CITRATE 50 UG/ML
25-50 INJECTION, SOLUTION INTRAMUSCULAR; INTRAVENOUS
Status: DISCONTINUED | OUTPATIENT
Start: 2018-01-31 | End: 2018-02-01

## 2018-01-31 RX ORDER — ALUMINA, MAGNESIA, AND SIMETHICONE 2400; 2400; 240 MG/30ML; MG/30ML; MG/30ML
30 SUSPENSION ORAL EVERY 4 HOURS PRN
Status: DISCONTINUED | OUTPATIENT
Start: 2018-01-31 | End: 2018-02-01

## 2018-01-31 RX ORDER — SODIUM CHLORIDE, SODIUM LACTATE, POTASSIUM CHLORIDE, CALCIUM CHLORIDE 600; 310; 30; 20 MG/100ML; MG/100ML; MG/100ML; MG/100ML
INJECTION, SOLUTION INTRAVENOUS CONTINUOUS
Status: DISCONTINUED | OUTPATIENT
Start: 2018-01-31 | End: 2018-02-02 | Stop reason: HOSPADM

## 2018-01-31 RX ORDER — SODIUM CHLORIDE 9 MG/ML
1000 INJECTION, SOLUTION INTRAVENOUS CONTINUOUS
Status: DISCONTINUED | OUTPATIENT
Start: 2018-01-31 | End: 2018-01-31

## 2018-01-31 RX ORDER — OXYCODONE HYDROCHLORIDE 5 MG/1
5 TABLET ORAL EVERY 4 HOURS PRN
Qty: 15 TABLET | Refills: 0 | Status: SHIPPED | OUTPATIENT
Start: 2018-01-31 | End: 2018-02-22

## 2018-01-31 RX ADMIN — HYDROMORPHONE HYDROCHLORIDE 1 MG: 1 INJECTION, SOLUTION INTRAMUSCULAR; INTRAVENOUS; SUBCUTANEOUS at 17:26

## 2018-01-31 RX ADMIN — SUGAMMADEX 160 MG: 100 INJECTION, SOLUTION INTRAVENOUS at 22:42

## 2018-01-31 RX ADMIN — ONDANSETRON 4 MG: 2 INJECTION INTRAMUSCULAR; INTRAVENOUS at 13:45

## 2018-01-31 RX ADMIN — SODIUM CHLORIDE, PRESERVATIVE FREE 76 ML: 5 INJECTION INTRAVENOUS at 13:17

## 2018-01-31 RX ADMIN — PIPERACILLIN SODIUM AND TAZOBACTAM SODIUM 2.25 G: .25; 2 INJECTION, POWDER, LYOPHILIZED, FOR SOLUTION INTRAVENOUS at 21:43

## 2018-01-31 RX ADMIN — SODIUM CHLORIDE: 9 INJECTION, SOLUTION INTRAVENOUS at 21:12

## 2018-01-31 RX ADMIN — PIPERACILLIN SODIUM AND TAZOBACTAM SODIUM 3.38 G: 36; 4.5 INJECTION, POWDER, FOR SOLUTION INTRAVENOUS at 17:36

## 2018-01-31 RX ADMIN — Medication 100 MG: at 21:25

## 2018-01-31 RX ADMIN — FENTANYL CITRATE 25 MCG: 50 INJECTION, SOLUTION INTRAMUSCULAR; INTRAVENOUS at 23:54

## 2018-01-31 RX ADMIN — HYDROMORPHONE HYDROCHLORIDE 1 MG: 1 INJECTION, SOLUTION INTRAMUSCULAR; INTRAVENOUS; SUBCUTANEOUS at 15:13

## 2018-01-31 RX ADMIN — FAMOTIDINE 20 MG: 10 INJECTION, SOLUTION INTRAVENOUS at 15:32

## 2018-01-31 RX ADMIN — SODIUM CHLORIDE, PRESERVATIVE FREE 500 ML: 5 INJECTION INTRAVENOUS at 17:35

## 2018-01-31 RX ADMIN — MIDAZOLAM 2 MG: 1 INJECTION INTRAMUSCULAR; INTRAVENOUS at 21:12

## 2018-01-31 RX ADMIN — SODIUM CHLORIDE, POTASSIUM CHLORIDE, SODIUM LACTATE AND CALCIUM CHLORIDE: 600; 310; 30; 20 INJECTION, SOLUTION INTRAVENOUS at 22:13

## 2018-01-31 RX ADMIN — SODIUM CHLORIDE 150 ML: 900 IRRIGANT IRRIGATION at 22:30

## 2018-01-31 RX ADMIN — LIDOCAINE HYDROCHLORIDE 100 MG: 20 INJECTION, SOLUTION INFILTRATION; PERINEURAL at 21:25

## 2018-01-31 RX ADMIN — FENTANYL CITRATE 50 MCG: 50 INJECTION, SOLUTION INTRAMUSCULAR; INTRAVENOUS at 21:48

## 2018-01-31 RX ADMIN — FENTANYL CITRATE 100 MCG: 50 INJECTION, SOLUTION INTRAMUSCULAR; INTRAVENOUS at 21:52

## 2018-01-31 RX ADMIN — ALUMINUM HYDROXIDE, MAGNESIUM HYDROXIDE, AND DIMETHICONE 30 ML: 400; 400; 40 SUSPENSION ORAL at 13:45

## 2018-01-31 RX ADMIN — SODIUM CHLORIDE 1000 ML: 9 INJECTION, SOLUTION INTRAVENOUS at 13:46

## 2018-01-31 RX ADMIN — ONDANSETRON 4 MG: 2 INJECTION INTRAMUSCULAR; INTRAVENOUS at 22:28

## 2018-01-31 RX ADMIN — IOPAMIDOL 104 ML: 755 INJECTION, SOLUTION INTRAVENOUS at 13:17

## 2018-01-31 RX ADMIN — ROCURONIUM BROMIDE 50 MG: 10 INJECTION INTRAVENOUS at 21:28

## 2018-01-31 RX ADMIN — PROPOFOL 200 MG: 10 INJECTION, EMULSION INTRAVENOUS at 21:25

## 2018-01-31 RX ADMIN — FENTANYL CITRATE 100 MCG: 50 INJECTION, SOLUTION INTRAMUSCULAR; INTRAVENOUS at 21:25

## 2018-01-31 ASSESSMENT — ENCOUNTER SYMPTOMS
ABDOMINAL PAIN: 1
DIARRHEA: 0
FEVER: 0
CONSTIPATION: 0

## 2018-01-31 NOTE — ED PROVIDER NOTES
"  History     Chief Complaint   Patient presents with     Abdominal Pain     HPI  Andrew Lockwood is a 50 year old male who presents to emergency department with complaints of significant abdominal pain that extends down to his groin that started this morning.  He states that he is having regular bowel movements but on occasion has hard small bowel movements.  He denies all other complaints at this time.  This is similar to his pain that he has had in the past of epigastric pain and constipation.  He has not done anything at home to relieve any of his discomfort.    I have reviewed the Medications, Allergies, Past Medical and Surgical History, and Social History in the Epic system.    Review of Systems   All other systems reviewed and are negative.      Physical Exam   BP: 144/82  Heart Rate: 99  Temp: 98  F (36.7  C)  Resp: 17  Height: 165.1 cm (5' 5\")  Weight: 77.1 kg (170 lb)  SpO2: 98 %      Physical Exam   Constitutional: He is oriented to person, place, and time. He appears well-developed and well-nourished.   Patient is extremely anxious   HENT:   Head: Normocephalic and atraumatic.   Eyes: No scleral icterus.   Neck: Normal range of motion. Neck supple.   Cardiovascular: Normal rate.    Pulmonary/Chest: Effort normal.   Abdominal: Soft. There is tenderness (luq).   Neurological: He is alert and oriented to person, place, and time.   Skin: Skin is warm and dry. No rash noted. He is not diaphoretic. No erythema. No pallor.       ED Course     ED Course     Procedures             Critical Care time:  none         Results for orders placed or performed during the hospital encounter of 01/30/18   Abdomen XR, 2 vw, flat and upright    Narrative    XR ABDOMEN 2 VW  1/30/2018 9:26 PM      HISTORY: r/o constipation;     COMPARISON: CT 11/7/2017    FINDINGS: Supine and upright radiographs of the abdomen were obtained.  Nonobstructive bowel gas pattern. Moderate amount of stool in the  colon. No pneumatosis. No " intra-abdominal free air. No suspicious  osseous lesion. Penile prosthesis partially visualized. Linear  opacities at the lung bases, likely atelectasis.      Impression    IMPRESSION: Moderate colonic stool burden. Nonobstructive bowel gas  pattern.    I have personally reviewed the examination and initial interpretation  and I agree with the findings.    JOSUE BHATT MD   Comprehensive metabolic panel   Result Value Ref Range    Sodium 138 133 - 144 mmol/L    Potassium 3.6 3.4 - 5.3 mmol/L    Chloride 105 94 - 109 mmol/L    Carbon Dioxide 24 20 - 32 mmol/L    Anion Gap 9 3 - 14 mmol/L    Glucose 159 (H) 70 - 99 mg/dL    Urea Nitrogen 15 7 - 30 mg/dL    Creatinine 0.79 0.66 - 1.25 mg/dL    GFR Estimate >90 >60 mL/min/1.7m2    GFR Estimate If Black >90 >60 mL/min/1.7m2    Calcium 9.2 8.5 - 10.1 mg/dL    Bilirubin Total 0.4 0.2 - 1.3 mg/dL    Albumin 3.8 3.4 - 5.0 g/dL    Protein Total 7.5 6.8 - 8.8 g/dL    Alkaline Phosphatase 106 40 - 150 U/L    ALT 35 0 - 70 U/L    AST 22 0 - 45 U/L   CBC with platelets differential   Result Value Ref Range    WBC 16.5 (H) 4.0 - 11.0 10e9/L    RBC Count 4.35 (L) 4.4 - 5.9 10e12/L    Hemoglobin 14.0 13.3 - 17.7 g/dL    Hematocrit 40.9 40.0 - 53.0 %    MCV 94 78 - 100 fl    MCH 32.2 26.5 - 33.0 pg    MCHC 34.2 31.5 - 36.5 g/dL    RDW 13.3 10.0 - 15.0 %    Platelet Count 234 150 - 450 10e9/L    Diff Method Automated Method     % Neutrophils 76.0 %    % Lymphocytes 16.6 %    % Monocytes 6.0 %    % Eosinophils 0.8 %    % Basophils 0.1 %    % Immature Granulocytes 0.5 %    Nucleated RBCs 0 0 /100    Absolute Neutrophil 12.6 (H) 1.6 - 8.3 10e9/L    Absolute Lymphocytes 2.7 0.8 - 5.3 10e9/L    Absolute Monocytes 1.0 0.0 - 1.3 10e9/L    Absolute Eosinophils 0.1 0.0 - 0.7 10e9/L    Absolute Basophils 0.0 0.0 - 0.2 10e9/L    Abs Immature Granulocytes 0.1 0 - 0.4 10e9/L    Absolute Nucleated RBC 0.0    Lipase   Result Value Ref Range    Lipase 323 73 - 393 U/L            Labs Ordered and  Resulted from Time of ED Arrival Up to the Time of Departure from the ED   COMPREHENSIVE METABOLIC PANEL - Abnormal; Notable for the following:        Result Value    Glucose 159 (*)     All other components within normal limits   CBC WITH PLATELETS DIFFERENTIAL - Abnormal; Notable for the following:     WBC 16.5 (*)     RBC Count 4.35 (*)     Absolute Neutrophil 12.6 (*)     All other components within normal limits   LIPASE            Assessments & Plan (with Medical Decision Making)   This is a 50-year-old male well-known to the emergency room for chronic abdominal pain.  He is presenting with his typical left upper quadrant abdominal discomfort.  He is provided with a single dose of 0.5 mg of Dilaudid as well as a GI cocktail and an abdominal x-ray was obtained.  He does have a moderate amount of constipation.  The GI cocktail seems to have significantly improved his symptoms.  On reassessment the patient is laughing with a friend in the room and does not appear to be in any distress.  I will discharge him with a prescription for MiraLAX and Prilosec.  He will also go home with mag citrate for his constipation.    I have reviewed the nursing notes.    I have reviewed the findings, diagnosis, plan and need for follow up with the patient.    Discharge Medication List as of 1/30/2018 10:02 PM      START taking these medications    Details   polyethylene glycol (MIRALAX) powder Take 17 g (1 capful) by mouth daily, Disp-527 g, R-0, Local Print      omeprazole (PRILOSEC) 20 MG CR capsule Take 1 capsule (20 mg) by mouth daily, Disp-30 capsule, R-0, Local Print             Final diagnoses:   Constipation, unspecified constipation type       1/30/2018   [facility], EMERGENCY DEPARTMENT     Torres Saenz MD  01/30/18 9584

## 2018-01-31 NOTE — IP AVS SNAPSHOT
MRN:[id number]                      After Visit Summary   1/31/2018    Andrew Lockwood    MRN: [id number]           Thank you!     Thank you for choosing [facility] for your care. Our goal is always to provide you with excellent care. Hearing back from our patients is one way we can continue to improve our services. Please take a few minutes to complete the written survey that you may receive in the mail after you visit with us. Thank you!        Patient Information     Date Of Birth          11/27/1967        Designated Caregiver       Most Recent Value    Caregiver    Will someone help with your care after discharge? no      About your hospital stay     You were admitted on:  January 31, 2018 You last received care in the:  Unit 6D Observation [facility]    You were discharged on:  February 2, 2018       Who to Call     For medical emergencies, please call 911.  For non-urgent questions about your medical care, please call your primary care provider or clinic, [phone]  For questions related to your surgery, please call your surgery clinic        Attending Provider     Provider Specialty    Tahmina Barkley MD Emergency Medicine    [facility], Dawn Butler MD Surgery    [facility], Susannah Waller MD Surgery       Primary Care Provider Office Phone # Fax #    Sharon Rosado [phone]      After Care Instructions     Diet Instructions       Resume pre-procedure diet            Discharge Instructions       Follow up appointment as instructed by Surgeon and or RN    WOUND -Inspect your wounds daily for signs of infection (increased redness, drainage, pain)  -Keep your wound clean and dry  -You may shower, but do not soak in tub or pool    NOTIFY  Please contact General Surgery Clinic if you develop any of the following:  -Temperature > 101F, chills, rigors, dizziness  -Redness around or purulent drainage from wound  -Inability to tolerate diet, nausea or  vomiting  --Have severe diarrhea/constipation  -Any other questions or concerns.  - At nights (after 4:30pm), on weekends, or if urgent, call [phone] and ask the  to speak with the on-call General Surgery resident or fellow    FOLLOW-UP  1.  General Surgery clinic will call in several days to assess recovery after surgery. If you wish to see a provider to discuss recovery further, you can schedule an appointment at that time.  2.  Follow up with your primary care provider in 1-2 weeks after discharge from the hospital to review this hospitalization.       *For other appointments on [facility] and/or [facility] (with [facility] or [facility] provider or service): Call [phone] if you have not heard regarding these appointments within 7 days of discharge.            No Alcohol       For 24 hours post procedure            No driving or operating machinery        until the day after procedure            No lifting        No lifting over 15 lbs and no strenuous physical activity for 4 weeks            Shower       No shower for 24 hours post procedure. May shower Postoperative Day (POD)  1                  Your next 10 appointments already scheduled     Feb 20, 2018  3:20 PM CST   (Arrive by 3:05 PM)   Return Visit with Aguila Ingram MD   [facility] ([facility])    [address] [place] [address]  4th [facility] [id number] [phone]            Mar 09, 2018 10:30 AM CST   (Arrive by 10:15 AM)   RETURN HAND with Evin Zamorano MD   [facility] ([facility])    [address] [place] [address]  4th [facility] [id number] [phone]            Apr 06, 2018  8:30 AM CDT   (Arrive by 8:15 AM)   Return Visit with Jose Ramon Singh MD   [facility] ([facility])    [address] [place] [address]  3rd [facility] [id number] [phone]              Pending Results     Date and Time  "Order Name Status Description    1/31/2018 2208 Surgical pathology exam In process             Statement of Approval     Ordered          02/02/18 1424  I have reviewed and agree with all the recommendations and orders detailed in this document.  EFFECTIVE NOW     Approved and electronically signed by:  Kevin Santana MD             Admission Information     Date & Time Provider Department Dept. Phone    1/31/2018 Susannah Arriaga MD Unit 6D Observation [facility] [phone]      Your Vitals Were     Blood Pressure Pulse Temperature Respirations Height Weight    151/97 (BP Location: Left arm) 120 99.5  F (37.5  C) (Oral) 16 1.65 m (5' 4.96\") 76.8 kg (169 lb 5 oz)    Pulse Oximetry BMI (Body Mass Index)                95% 28.21 kg/m2          MyChart Information     [facility] gives you secure access to your electronic health record. If you see a primary care provider, you can also send messages to your care team and make appointments. If you have questions, please call your primary care clinic.  If you do not have a primary care provider, please call [phone] and they will assist you.        Care EveryWhere ID     This is your Care EveryWhere ID. This could be used by other organizations to access your [facility] medical records  [id number]        Equal Access to Services     RA JORDAN AH: Apollo powero Soterence, waaxda luqadaha, qaybta kaalmada adeechoyada, dez salazar. So [facility] [phone].    ATENCIÓN: Si habla español, tiene a kohli disposición servicios gratuitos de asistencia lingüística. Llame al [phone].    We comply with applicable federal civil rights laws and [place] laws. We do not discriminate on the basis of race, color, national origin, age, disability, sex, sexual orientation, or gender identity.               Review of your medicines      START taking        Dose / Directions    ibuprofen 200 MG tablet   Commonly known as:  ADVIL/MOTRIN        Dose: "  200-400 mg   Take 1-2 tablets (200-400 mg) by mouth every 6 hours as needed for moderate pain   Quantity:  40 tablet   Refills:  0       oxyCODONE IR 5 MG tablet   Commonly known as:  ROXICODONE   Used for:  Acute appendicitis, unspecified acute appendicitis type        Dose:  5 mg   Take 1 tablet (5 mg) by mouth every 4 hours as needed for pain maximum 6 tablet(s) per day   Quantity:  15 tablet   Refills:  0       polyethylene glycol Packet   Commonly known as:  MIRALAX/GLYCOLAX        Dose:  17 g   Take 17 g by mouth daily as needed for constipation   Quantity:  10 packet   Refills:  0         CONTINUE these medicines which have NOT CHANGED        Dose / Directions    alum & mag hydroxide-simethicone 400-400-40 MG/5ML Susp suspension   Commonly known as:  MAALOX ADVANCED MAX ST        Dose:  30 mL   Take 30 mLs by mouth every 4 hours as needed for indigestion   Quantity:  355 mL   Refills:  0       blood glucose lancets standard   Commonly known as:  no brand specified   Used for:  Type 2 diabetes mellitus without complication, without long-term current use of insulin (H)        Use to test blood sugar four times daily or as directed.   Quantity:  100 Box   Refills:  5       * blood glucose monitoring meter device kit   Commonly known as:  no brand specified   Used for:  Type 2 diabetes mellitus without complication, without long-term current use of insulin (H)        Use to test blood sugar four times daily or as directed.   Quantity:  1 kit   Refills:  0       * blood glucose monitoring meter device kit   Commonly known as:  no brand specified   Used for:  Type 2 diabetes mellitus with complication, without long-term current use of insulin (H)        Use to test blood sugar 4 times daily or as directed.   Quantity:  1 kit   Refills:  0       * blood glucose monitoring test strip   Commonly known as:  no brand specified   Used for:  Type 2 diabetes mellitus without complication, without long-term current use of  insulin (H)        Use to test blood sugars four times daily or as directed   Quantity:  100 each   Refills:  5       * blood glucose monitoring test strip   Commonly known as:  no brand specified   Used for:  Type 2 diabetes mellitus with complication, without long-term current use of insulin (H)        Dose:  1 strip   1 strip by In Vitro route 4 times daily (before meals and nightly) Use to test blood sugars 4 times daily or as directed   Quantity:  100 strip   Refills:  11       Buwhcrd-Ijzez-Ybcffvsn-TenofAF [id number] MG Tabs per tablet   Commonly known as:  GENVOYA   Used for:  Human immunodeficiency virus (HIV) disease        Dose:  1 tablet   Take 1 tablet by mouth daily   Quantity:  30 tablet   Refills:  5       gabapentin 300 MG capsule   Commonly known as:  NEURONTIN   Used for:  Herpes zoster without complication        Dose:  300 mg   Take 1 capsule (300 mg) by mouth 3 times daily   Quantity:  90 capsule   Refills:  5       glipiZIDE 5 MG tablet   Commonly known as:  GLUCOTROL   Used for:  Type 2 diabetes mellitus with complication, without long-term current use of insulin (H)        Dose:  2.5-5 mg   Take 0.5-1 tablets (2.5-5 mg) by mouth daily   Quantity:  30 tablet   Refills:  11       * Notice:  This list has 4 medication(s) that are the same as other medications prescribed for you. Read the directions carefully, and ask your doctor or other care provider to review them with you.         Where to get your medicines      These medications were sent to [facility] [place], [place] - [address]  [address] [facility] [address]     Phone:  [phone]     ibuprofen 200 MG tablet    polyethylene glycol Packet         Some of these will need a paper prescription and others can be bought over the counter. Ask your nurse if you have questions.     Bring a paper prescription for each of these medications     oxyCODONE IR 5 MG tablet                Protect others  around you: Learn how to safely use, store and throw away your medicines at www.disposemymeds.org.        Information about OPIOIDS     PRESCRIPTION OPIOIDS: WHAT YOU NEED TO KNOW    Prescription opioids can be used to help relieve moderate to severe pain and are often prescribed following a surgery or injury, or for certain health conditions. These medications can be an important part of treatment but also come with serious risks. It is important to work with your health care provider to make sure you are getting the safest, most effective care.    WHAT ARE THE RISKS AND SIDE EFFECTS OF OPIOID USE?  Prescription opioids carry serious risks of addiction and overdose, especially with prolonged use. An opioid overdose, often marked by slowed breathing can cause sudden death. The use of prescription opioids can have a number of side effects as well, even when taken as directed:      Tolerance - meaning you might need to take more of a medication for the same pain relief    Physical dependence - meaning you have symptoms of withdrawal when a medication is stopped    Increased sensitivity to pain    Constipation    Nausea, vomiting, and dry mouth    Sleepiness and dizziness    Confusion    Depression    Low levels of testosterone that can result in lower sex drive, energy, and strength    Itching and sweating    RISKS ARE GREATER WITH:    History of drug misuse, substance use disorder, or overdose    Mental health conditions (such as depression or anxiety)    Sleep apnea    Older age (65 years or older)    Pregnancy    Avoid alcohol while taking prescription opioids.   Also, unless specifically advised by your health care provider, medications to avoid include:    Benzodiazepines (such as Xanax or Valium)    Muscle relaxants (such as Soma or Flexeril)    Hypnotics (such as Ambien or Lunesta)    Other prescription opioids    KNOW YOUR OPTIONS:  Talk to your health care provider about ways to manage your pain that do not  involve prescription opioids. Some of these options may actually work better and have fewer risks and side effects:    Pain relievers such as acetaminophen, ibuprofen, and naproxen    Some medications that are also used for depression or seizures    Physical therapy and exercise    Cognitive behavioral therapy, a psychological, goal-directed approach, in which patients learn how to modify physical, behavioral, and emotional triggers of pain and stress    IF YOU ARE PRESCRIBED OPIOIDS FOR PAIN:    Never take opioids in greater amounts or more often than prescribed    Follow up with your primary health care provider and work together to create a plan on how to manage your pain.    Talk about ways to help manage your pain that do not involve prescription opioids    Talk about all concerns and side effects    Help prevent misuse and abuse    Never sell or share prescription opioids    Never use another person's prescription opioids    Store prescription opioids in a secure place and out of reach of others (this may include visitors, children, friends, and family)    Visit www.cdc.gov/drugoverdose to learn about risks of opioid abuse and overdose    If you believe you may be struggling with addiction, tell your health care provider and ask for guidance or call [place]'s National Helpline at 6-643-352-HELP    LEARN MORE / www.cdc.gov/drugoverdose/prescribing/guideline.html    Safely dispose of unused prescription opioids: Find your local drug take-back programs and more information about the importance of safe disposal at www.doseofreality.[place].gov             Medication List: This is a list of all your medications and when to take them. Check marks below indicate your daily home schedule. Keep this list as a reference.      Medications           Morning Afternoon Evening Bedtime As Needed    alum & mag hydroxide-simethicone 400-400-40 MG/5ML Susp suspension   Commonly known as:  MAALOX ADVANCED MAX ST   Take 30 mLs by mouth  every 4 hours as needed for indigestion   Last time this was given:  30 mLs on 1/31/2018  1:45 PM                                blood glucose lancets standard   Commonly known as:  no brand specified   Use to test blood sugar four times daily or as directed.                                * blood glucose monitoring meter device kit   Commonly known as:  no brand specified   Use to test blood sugar four times daily or as directed.                                * blood glucose monitoring meter device kit   Commonly known as:  no brand specified   Use to test blood sugar 4 times daily or as directed.                                * blood glucose monitoring test strip   Commonly known as:  no brand specified   Use to test blood sugars four times daily or as directed                                * blood glucose monitoring test strip   Commonly known as:  no brand specified   1 strip by In Vitro route 4 times daily (before meals and nightly) Use to test blood sugars 4 times daily or as directed                                Lrynocr-Kadjg-Orfelmqk-TenofAF [phone] MG Tabs per tablet   Commonly known as:  GENVOYA   Take 1 tablet by mouth daily   Last time this was given:  1 tablet on 2/2/2018  8:17 AM                                gabapentin 300 MG capsule   Commonly known as:  NEURONTIN   Take 1 capsule (300 mg) by mouth 3 times daily   Last time this was given:  300 mg on 2/2/2018  2:15 PM                                glipiZIDE 5 MG tablet   Commonly known as:  GLUCOTROL   Take 0.5-1 tablets (2.5-5 mg) by mouth daily   Last time this was given:  5 mg on 2/2/2018  8:17 AM                                ibuprofen 200 MG tablet   Commonly known as:  ADVIL/MOTRIN   Take 1-2 tablets (200-400 mg) by mouth every 6 hours as needed for moderate pain   Last time this was given:  400 mg on 2/2/2018  2:15 PM                                oxyCODONE IR 5 MG tablet   Commonly known as:  ROXICODONE   Take 1 tablet (5 mg)  by mouth every 4 hours as needed for pain maximum 6 tablet(s) per day   Last time this was given:  10 mg on 2/2/2018 12:44 PM                                polyethylene glycol Packet   Commonly known as:  MIRALAX/GLYCOLAX   Take 17 g by mouth daily as needed for constipation                                * Notice:  This list has 4 medication(s) that are the same as other medications prescribed for you. Read the directions carefully, and ask your doctor or other care provider to review them with you.

## 2018-01-31 NOTE — H&P
General Surgery History and Physical     Andrew Lockwood MRN# [id number]   YOB: 1967 Age: 50 year old      Date of Admission:  1/31/2018    CC: Abdominal Pain    Assessment/Plan: 50 year old male with history of HIV, CMB, toxoplasmosis, DM prior negative diagnostic laparoscopy (July 2016) who presented on 1/30 with abdominal pain. WBC 17.1. CT imaging consistent with acute appendicitis +RSV.     -NPO  -mIVF  -Zosyn  -Add on for laparoscopic appendectomy this evening      HPI: 50 year old male with history of HIV, CMB, CNS toxoplasmosis, DM II, DANISHA,  prior negative diagnostic laparoscopy who presented on 1/30 with abdominal pain.  Pain started yesterday morning that was diffuse and has now migrated to the RLQ. Pain has been progressively worsening, elevated with movement. Temp to 100.3F +Nausea and emesis yesterday, improved today. Last bowel movement yesterday. Nothing to eat today. WBC 17.1.  CT imaging demonstrating inflamed appendix with appendicolith and periappendiceal fat stranding. Underwent a diagnostic laparoscopy with Dr. Arriaga on 7/26/17 that was negative, with GI symptoms thought to be attributable to viral gastroenteritis. Last EGD on 1/8/16 showing LA grade A reflux esophagitis. Colonoscopy at that time was normal.        Past Medical History:  Past Medical History:   Diagnosis Date     AIDS (H)      Allergic rhinitis due to other allergen     DNS     Chronic abdominal pain      CNS toxoplasmosis (H)      Diabetes type 2, controlled (H)      GERD (gastroesophageal reflux disease)      HIV (human immunodeficiency virus infection) (H)      Periungual wart      Sleep apnea     doesn't use CPAP       Past Surgical History:  Past Surgical History:   Procedure Laterality Date     C NONSPECIFIC PROCEDURE      right forearm fracture     COLONOSCOPY Left 1/22/2016    Procedure: COMBINED COLONOSCOPY, SINGLE OR MULTIPLE BIOPSY/POLYPECTOMY BY BIOPSY;  Surgeon: Clark Saini MD;  Location:   GI     HC EXPLORE UNDESC TESTIS,INGUIN/SCROTAL       LAPAROSCOPY DIAGNOSTIC (GENERAL) N/A 7/26/2016    Procedure: LAPAROSCOPY DIAGNOSTIC (GENERAL);  Surgeon: Susannah Arriaga MD;  Location: [facility] OR     OPTICAL TRACKING SYSTEM CRANIOTOMY, EXCISE TUMOR, COMBINED Left 4/10/2015    Procedure: COMBINED OPTICAL TRACKING SYSTEM CRANIOTOMY, EXCISE TUMOR;  Surgeon: Mirlande Colmenares MD;  Location: [facility] OR     REPAIR GAMEKEEPER'S THUMB Right 12/2/2016    Procedure: REPAIR LIGAMENT ULNAR COLLATERAL THUMB (GAMEKEEPER'S);  Surgeon: Evin Zamorano MD;  Location: [facility] OR       Allergies:     Allergies   Allergen Reactions     Metformin      Abdominal pain     Milk [Lac Bovis] Hives     Tylenol [Acetaminophen] Itching       Medications:    Current Facility-Administered Medications on File Prior to Encounter:  [COMPLETED] lidocaine (viscous) (XYLOCAINE) 2 % 15 mL, alum & mag hydroxide-simethicone (MYLANTA ES/MAALOX  ES) 15 mL GI Cocktail   [COMPLETED] HYDROmorphone (PF) (DILAUDID) injection 0.5 mg   [COMPLETED] magnesium citrate solution 296 mL   [COMPLETED] mineral oil oil 30 mL     Current Outpatient Prescriptions on File Prior to Encounter:  polyethylene glycol (MIRALAX) powder Take 17 g (1 capful) by mouth daily   omeprazole (PRILOSEC) 20 MG CR capsule Take 1 capsule (20 mg) by mouth daily   alum & mag hydroxide-simethicone (MAALOX ADVANCED MAX ST) 400-400-40 MG/5ML SUSP suspension Take 30 mLs by mouth every 4 hours as needed for indigestion   DICYCLOMINE HCL PO Take 10 mg by mouth 4 times daily   montelukast (SINGULAIR) 10 MG tablet Take 10 mg by mouth At Bedtime   blood glucose monitoring (NO BRAND SPECIFIED) meter device kit Use to test blood sugar 4 times daily or as directed.   blood glucose monitoring (NO BRAND SPECIFIED) test strip 1 strip by In Vitro route 4 times daily (before meals and nightly) Use to test blood sugars 4 times daily or as directed   glipiZIDE (GLUCOTROL) 5 MG tablet Take 0.5-1 tablets (2.5-5  "mg) by mouth daily   pseudoePHEDrine (SUDAFED) 30 MG tablet Take 2 tablets (60 mg) by mouth every 6 hours as needed for congestion (Patient not taking: Reported on 1/24/2018)   gabapentin (NEURONTIN) 300 MG capsule Take 1 capsule (300 mg) by mouth 3 times daily   GENVOYA 677-327-325-10 mg tablet Take 1 tablet by mouth daily   blood glucose monitoring (NO BRAND SPECIFIED) meter device kit Use to test blood sugar four times daily or as directed.   blood glucose monitoring (NO BRAND SPECIFIED) test strip Use to test blood sugars four times daily or as directed   blood glucose (NO BRAND SPECIFIED) lancets standard Use to test blood sugar four times daily or as directed.       Social History:  Social History     Social History     Marital status: Single     Spouse name: N/A     Number of children: N/A     Years of education: N/A     Occupational History           5 years; temp agency     Social History Main Topics     Smoking status: Current Every Day Smoker     Packs/day: 0.20     Last attempt to quit: 10/28/2016     Smokeless tobacco: Former User      Comment: 4 cigarettes     Alcohol use No      Comment: Last etoh in 2007     Drug use: No      Comment: \"Not very often\"     Sexual activity: Not Currently     Partners: Female, Male      Comment: Last sexual activity 2008     Other Topics Concern     Not on file     Social History Narrative    Born in [facility].  Came to the [place] in 1993.  Last traveled to visit family in 2008.            Family History:  Family History   Problem Relation Age of Onset     DIABETES Brother      DIABETES Father      Alzheimer Disease Father      Unknown/Adopted Mother      DIABETES Paternal Grandfather      CANCER No family hx of      no skin cancer     Skin Cancer No family hx of      no famiy hx of skin cancer       ROS:  As noted above. No headache, dizziness. + fever, no chills. No chest pain or shortness of breath. _+ abdominal pain, + nausea, + vomiting.  No " "urinary difficulties.     Exam:  /81  Pulse 100  Temp 99.3  F (37.4  C) (Oral)  Resp 18  Ht 1.651 m (5' 5\")  SpO2 95%  BMI 28.29 kg/m2    General: Alert, male in no acute distress.  HEENT: Normocephalic, atraumatic. Patent nares.   Chest wall: Symmetric thorax. No masses or tenderness to palpation.   Respiratory: Non-labored breathing. Lung sounds clear to auscultation bilaterally.   Cardiovascular: Regular rate and rhythm.   Gastrointestinal: Abdomen soft, non-distended, tender to palpation in the RLQ. No generalized peritonitis. Negative Obturator sign. No organomegaly or masses appreciated.   Extremities: Moving all four extremities. No limb deformities. No pedal edema.   Skin: As noted above. No rashes or lesions appreciated.    Labs:  Labs reviewed  WBC 17.1  Hgb 13.5    Cr 0.74   RSV +    Imaging:  CT Abd/Pelvis   1. In this patient with right lower quadrant pain there are CT  findings of acute appendicitis with dilated appendix, appendicolith,  and periappendiceal inflammatory changes. No free air. No abscess  formation.  2. Poorly characterized 5 mm groundglass nodule in the right middle  lobe stable from the 2015 CT exam.     EK/7/17: Normal Sinus Rhythm    Jay Dangelo MD.................2018   4:42 PM  P: [id number]  Discussed with Dr. Holt on 18      "

## 2018-01-31 NOTE — ED NOTES
Presents with abdominal pain that extends down to his groin starting this morning. Patient reports normal bowel movements.

## 2018-01-31 NOTE — ED PROVIDER NOTES
History     Chief Complaint   Patient presents with     Abdominal Pain     HPI  Andrew Lockwood is a 50 year old male with history of DM2, AIDS, CNX toxoplasmosis, GERD, chronic abdominal pain, and SBO the ED with abdominal pain.  Per review of his chart he was seen here yesterday for the same symptoms pain in the LUQ of the abdomen.  He was treated with a single dose of 0.5 mg of Dilaudid and a GI cocktail.  He did have an abdominal x-ray which showed a moderate amount of stool, so he was discharged with prescription for MiraLAX,  Prilosec, and magnesium citrate.  Patient states his pain has been constant since yesterday and now complains of RLQ abdominal pain.  He states his last bowel movement was yesterday and states was normal.  He also states he had an episode of vomiting yesterday and ran a temperature of 99 F.  He otherwise currently denies any previous history of abdominal surgeries, diarrhea, or fevers.    PAST MEDICAL HISTORY  Past Medical History:   Diagnosis Date     AIDS (H)      Allergic rhinitis due to other allergen     DNS     Chronic abdominal pain      CNS toxoplasmosis (H)      Diabetes type 2, controlled (H)      GERD (gastroesophageal reflux disease)      HIV (human immunodeficiency virus infection) (H)      Periungual wart      Sleep apnea     doesn't use CPAP     PAST SURGICAL HISTORY  Past Surgical History:   Procedure Laterality Date     C NONSPECIFIC PROCEDURE      right forearm fracture     COLONOSCOPY Left 1/22/2016    Procedure: COMBINED COLONOSCOPY, SINGLE OR MULTIPLE BIOPSY/POLYPECTOMY BY BIOPSY;  Surgeon: Clark Saini MD;  Location:  GI     HC EXPLORE UNDESC TESTIS,INGUIN/SCROTAL       LAPAROSCOPY DIAGNOSTIC (GENERAL) N/A 7/26/2016    Procedure: LAPAROSCOPY DIAGNOSTIC (GENERAL);  Surgeon: Susannah Arriaga MD;  Location:  OR     OPTICAL TRACKING SYSTEM CRANIOTOMY, EXCISE TUMOR, COMBINED Left 4/10/2015    Procedure: COMBINED OPTICAL TRACKING SYSTEM CRANIOTOMY, EXCISE  TUMOR;  Surgeon: Mirlande Colmenares MD;  Location: [facility] OR     REPAIR GAMEKEEPER'S THUMB Right 12/2/2016    Procedure: REPAIR LIGAMENT ULNAR COLLATERAL THUMB (GAMEKEEPER'S);  Surgeon: Evin Zamorano MD;  [facility]     FAMILY HISTORY  Family History   Problem Relation Age of Onset     DIABETES Brother      DIABETES Father      Alzheimer Disease Father      Unknown/Adopted Mother      DIABETES Paternal Grandfather      CANCER No family hx of      no skin cancer     Skin Cancer No family hx of      no famiy hx of skin cancer     SOCIAL HISTORY  Social History   Substance Use Topics     Smoking status: Current Every Day Smoker     Packs/day: 0.20     Last attempt to quit: 10/28/2016     Smokeless tobacco: Former User      Comment: 4 cigarettes     Alcohol use No      Comment: Last etoh in 2007     MEDICATIONS  Current Facility-Administered Medications   Medication     0.9% sodium chloride BOLUS     Current Outpatient Prescriptions   Medication     polyethylene glycol (MIRALAX) powder     omeprazole (PRILOSEC) 20 MG CR capsule     alum & mag hydroxide-simethicone (MAALOX ADVANCED MAX ST) 400-400-40 MG/5ML SUSP suspension     DICYCLOMINE HCL PO     montelukast (SINGULAIR) 10 MG tablet     blood glucose monitoring (NO BRAND SPECIFIED) meter device kit     blood glucose monitoring (NO BRAND SPECIFIED) test strip     glipiZIDE (GLUCOTROL) 5 MG tablet     pseudoePHEDrine (SUDAFED) 30 MG tablet     gabapentin (NEURONTIN) 300 MG capsule     GENVOYA [id number] mg tablet     blood glucose monitoring (NO BRAND SPECIFIED) meter device kit     blood glucose monitoring (NO BRAND SPECIFIED) test strip     blood glucose (NO BRAND SPECIFIED) lancets standard     ALLERGIES  Allergies   Allergen Reactions     Metformin      Abdominal pain     Milk [Lac Bovis] Hives     Tylenol [Acetaminophen] Itching         I have reviewed the Medications, Allergies, Past Medical and Surgical History, and Social History in the Epic  "system.    Review of Systems   Constitutional: Negative for fever.   Gastrointestinal: Positive for abdominal pain (RLQ). Negative for constipation and diarrhea.   All other systems reviewed and are negative.      Physical Exam   BP: 117/81  Pulse: 100  Temp: 99.3  F (37.4  C)  Resp: 18  Height: 165.1 cm (5' 5\")  SpO2: 99 %      Physical Exam   Constitutional: He is oriented to person, place, and time. No distress.   HENT:   Head: Atraumatic.   Mouth/Throat: Oropharynx is clear and moist. No oropharyngeal exudate.   Eyes: Pupils are equal, round, and reactive to light. No scleral icterus.   Cardiovascular: Normal heart sounds and intact distal pulses.    Pulmonary/Chest: Breath sounds normal. No respiratory distress.   Abdominal: Soft. Bowel sounds are normal. There is tenderness. There is guarding.   Tender RLQ   Musculoskeletal: He exhibits no edema or tenderness.   Neurological: He is alert and oriented to person, place, and time.   Skin: Skin is warm. No rash noted. He is not diaphoretic.   Nursing note and vitals reviewed.      ED Course     ED Course     Procedures   11:25 AM  The patient was seen and examined by Dr. Barkley in Room 7.                Critical Care time:  none             Labs Ordered and Resulted from Time of ED Arrival Up to the Time of Departure from the ED - No data to display         Assessments & Plan (with Medical Decision Making)     50 year old male with history of DM2, AIDS, CNX toxoplasmosis, GERD, chronic abdominal pain, and SBO the ED with abdominal pain, presents for second time in as many days with complaint of abd pain initially generalized and now migratory to RLQ. Presentation concerning for appendicitis, diverticulitis or other intraabdominal pathology.IV established and labs sent and remarkable for WBC 17.1. CT Abd/Pelvis obtained which revealed signs suggestive of appendicitis. IV normal saline and zosyn administered. Case discussed with Surgery with plans to admit the " patient.     I have reviewed the nursing notes.    I have reviewed the findings, diagnosis, plan and need for follow up with the patient.    New Prescriptions    No medications on file       Final diagnoses:   Acute appendicitis, unspecified acute appendicitis type     I, Minh Boss, am serving as a trained medical scribe to document services personally performed by Tahmina Barkley MD, based on the provider's statements to me.      I, Tahmina Barkley MD, was physically present and have reviewed and verified the accuracy of this note documented by Minh Boss.       1/31/2018   [facility], [place], EMERGENCY DEPARTMENT     Tahmina Barkley MD  02/06/18 [id number]

## 2018-01-31 NOTE — IP AVS SNAPSHOT
Unit 6D Observation [address] [address]    Phone:  [phone]    Fax:  [phone]                                       After Visit Summary   1/31/2018    Andrew Lockwood    MRN: [id number]           After Visit Summary Signature Page     I have received my discharge instructions, and my questions have been answered. I have discussed any challenges I see with this plan with the nurse or doctor.    ..........................................................................................................................................  Patient/Patient Representative Signature      ..........................................................................................................................................  Patient Representative Print Name and Relationship to Patient    ..................................................               ................................................  Date                                            Time    ..........................................................................................................................................  Reviewed by Signature/Title    ...................................................              ..............................................  Date                                                            Time

## 2018-01-31 NOTE — PROGRESS NOTES
"Emergency Social Work Services Note    Date of  Intervention: 01/31/18  Last Emergency Department Visit:  1/30/18  Care Plan:  None.  Collaborated with:  Patient; Caitie, bedside RN    Data:  Pt presented to the ED and will be having a lap appendectomy tonight.  Chart notes from a December clinic visit indicate pt's housing ([facility]) may be in jeopardy.    Intervention:  Chart reviewed.  SW met with pt.  Pt had his eyes closed, holding his stomach.  Pt confirms that he left [facility] [employer] and has since been staying \"at the arch on a couch\" with a friend.  Pt does not know if he can return to this housing situation at d/c from the hospital.  It does not appear to be stable housing.  Pt states he sees Simon Cadena, ID clinic Liaison, regularly.  Pt OK with writer contacting Simon.  Writer ended conversation so pt could rest as pt was obviously in pain and uncomfortable.    SW e-mailed Simon Cadena inquiring about housing resources that may benefit pt at d/c.    Assessment:  Pt may need assist with housing/shelter resources at d/c.    Plan:    Anticipated Disposition:  to be determined.    Barriers to d/c plan:  Medical stability.    Follow Up:  SW will continue to follow.    ELZA Tompkins  Social Work Services  Emergency Department   [phone] phone  [phone] pager  On-call pager, [phone], 1600 to midnight        "

## 2018-01-31 NOTE — ED NOTES
Pt presents to ED with complaints of right sided abdominal pain. Pt states he was vomiting all night. Pt states last time he was in this ED he was diagnosed with constipation.

## 2018-01-31 NOTE — LETTER
Transition Communication Hand-off for Care Transitions to Next Level of Care Provider    Name: Andrew Lockwood  MRN #: [id number]  Primary Care Provider: MAI ELIZONDO     Primary Clinic: [address] [address]     Reason for Hospitalization:  Acute appendicitis, unspecified acute appendicitis type [K35.80]  Admit Date/Time: 1/31/2018 11:04 AM  Discharge Date: 2/2/18  Payor Source: Payor: BCBS / Plan: [place]BS [place] / Product Type: Indemnity /     Readmission Assessment Measure (SHIKHA) Risk Score/category: No SHIKHA score indicated.         Reason for Communication Hand-off Referral: Other Discharge to community    Discharge Plan:  Community discharge with follow up in clinic.     Concern for non-adherence with plan of care:   Y/N N  Discharge Needs Assessment:      Already enrolled in Tele-monitoring program and name of program:  Unknown  Follow-up specialty is recommended: Yes - Pt would like follow up with clinic SW services.    Follow-up plan:  Future Appointments  Date Time Provider Department Center   2/20/2018 3:20 PM Aguila Ingram MD [facility]   3/9/2018 10:30 AM Evni Zamorano MD Critical access hospital   4/6/2018 8:30 AM Jose Ramon Singh MD [facility]       Any outstanding tests or procedures:              Key Recommendations:  Pt would like to follow up with clinic SW for ongoing housing support.  Thank you, CRISTAL Lua, JOSHW  6C Unit   Phone: [phone]  Pager: [phone]  Unit: [phone]

## 2018-02-01 ENCOUNTER — TELEPHONE (OUTPATIENT)
Dept: INFECTIOUS DISEASES | Facility: CLINIC | Age: 53
End: 2018-02-01

## 2018-02-01 PROBLEM — Z90.49 S/P APPENDECTOMY: Status: ACTIVE | Noted: 2018-02-01

## 2018-02-01 LAB
GLUCOSE BLDC GLUCOMTR-MCNC: 181 MG/DL (ref 70–99)
GLUCOSE BLDC GLUCOMTR-MCNC: 206 MG/DL (ref 70–99)

## 2018-02-01 PROCEDURE — 25000132 ZZH RX MED GY IP 250 OP 250 PS 637: Performed by: STUDENT IN AN ORGANIZED HEALTH CARE EDUCATION/TRAINING PROGRAM

## 2018-02-01 PROCEDURE — 25000128 H RX IP 250 OP 636: Performed by: STUDENT IN AN ORGANIZED HEALTH CARE EDUCATION/TRAINING PROGRAM

## 2018-02-01 PROCEDURE — 25000128 H RX IP 250 OP 636: Performed by: ANESTHESIOLOGY

## 2018-02-01 PROCEDURE — 00000146 ZZHCL STATISTIC GLUCOSE BY METER IP

## 2018-02-01 PROCEDURE — G0378 HOSPITAL OBSERVATION PER HR: HCPCS

## 2018-02-01 RX ORDER — NALOXONE HYDROCHLORIDE 0.4 MG/ML
.1-.4 INJECTION, SOLUTION INTRAMUSCULAR; INTRAVENOUS; SUBCUTANEOUS
Status: DISCONTINUED | OUTPATIENT
Start: 2018-02-01 | End: 2018-02-02 | Stop reason: HOSPADM

## 2018-02-01 RX ORDER — IBUPROFEN 200 MG
200-400 TABLET ORAL EVERY 6 HOURS PRN
Status: DISCONTINUED | OUTPATIENT
Start: 2018-02-01 | End: 2018-02-02 | Stop reason: HOSPADM

## 2018-02-01 RX ORDER — OXYCODONE HYDROCHLORIDE 5 MG/1
5-10 TABLET ORAL EVERY 4 HOURS PRN
Status: DISCONTINUED | OUTPATIENT
Start: 2018-02-01 | End: 2018-02-02 | Stop reason: HOSPADM

## 2018-02-01 RX ORDER — POLYETHYLENE GLYCOL 3350 17 G/17G
17 POWDER, FOR SOLUTION ORAL DAILY PRN
Status: DISCONTINUED | OUTPATIENT
Start: 2018-02-01 | End: 2018-02-02 | Stop reason: HOSPADM

## 2018-02-01 RX ORDER — HYDROMORPHONE HCL/0.9% NACL/PF 0.2MG/0.2
0.2 SYRINGE (ML) INTRAVENOUS
Status: DISCONTINUED | OUTPATIENT
Start: 2018-02-01 | End: 2018-02-01

## 2018-02-01 RX ORDER — LIDOCAINE 40 MG/G
CREAM TOPICAL
Status: DISCONTINUED | OUTPATIENT
Start: 2018-02-01 | End: 2018-02-02 | Stop reason: HOSPADM

## 2018-02-01 RX ORDER — PROCHLORPERAZINE MALEATE 5 MG
10 TABLET ORAL EVERY 6 HOURS PRN
Status: DISCONTINUED | OUTPATIENT
Start: 2018-02-01 | End: 2018-02-02 | Stop reason: HOSPADM

## 2018-02-01 RX ORDER — OXYCODONE HYDROCHLORIDE 5 MG/1
5-10 TABLET ORAL
Status: DISCONTINUED | OUTPATIENT
Start: 2018-02-01 | End: 2018-02-01

## 2018-02-01 RX ORDER — POLYETHYLENE GLYCOL 3350 17 G/17G
17 POWDER, FOR SOLUTION ORAL DAILY PRN
Qty: 10 PACKET | Refills: 0 | Status: SHIPPED | OUTPATIENT
Start: 2018-02-01 | End: 2018-02-22

## 2018-02-01 RX ORDER — ACETAMINOPHEN 650 MG/1
650 SUPPOSITORY RECTAL EVERY 4 HOURS PRN
Status: DISCONTINUED | OUTPATIENT
Start: 2018-02-01 | End: 2018-02-01

## 2018-02-01 RX ORDER — ACETAMINOPHEN 325 MG/1
650 TABLET ORAL EVERY 4 HOURS PRN
Qty: 100 TABLET | Refills: 0 | Status: SHIPPED | OUTPATIENT
Start: 2018-02-01 | End: 2018-02-01

## 2018-02-01 RX ORDER — NALOXONE HYDROCHLORIDE 0.4 MG/ML
.1-.4 INJECTION, SOLUTION INTRAMUSCULAR; INTRAVENOUS; SUBCUTANEOUS
Status: DISCONTINUED | OUTPATIENT
Start: 2018-02-01 | End: 2018-02-01

## 2018-02-01 RX ORDER — ONDANSETRON 2 MG/ML
4 INJECTION INTRAMUSCULAR; INTRAVENOUS EVERY 6 HOURS PRN
Status: DISCONTINUED | OUTPATIENT
Start: 2018-02-01 | End: 2018-02-02 | Stop reason: HOSPADM

## 2018-02-01 RX ORDER — ACETAMINOPHEN 325 MG/1
650 TABLET ORAL EVERY 4 HOURS PRN
Status: DISCONTINUED | OUTPATIENT
Start: 2018-02-01 | End: 2018-02-01

## 2018-02-01 RX ORDER — DEXTROSE MONOHYDRATE 25 G/50ML
25-50 INJECTION, SOLUTION INTRAVENOUS
Status: DISCONTINUED | OUTPATIENT
Start: 2018-02-01 | End: 2018-02-02 | Stop reason: HOSPADM

## 2018-02-01 RX ORDER — IBUPROFEN 200 MG
200-400 TABLET ORAL EVERY 6 HOURS PRN
Qty: 40 TABLET | Refills: 0 | Status: SHIPPED | OUTPATIENT
Start: 2018-02-01 | End: 2018-02-15

## 2018-02-01 RX ORDER — ONDANSETRON 2 MG/ML
4 INJECTION INTRAMUSCULAR; INTRAVENOUS EVERY 6 HOURS PRN
Status: DISCONTINUED | OUTPATIENT
Start: 2018-02-01 | End: 2018-02-01

## 2018-02-01 RX ORDER — ONDANSETRON 4 MG/1
4 TABLET, ORALLY DISINTEGRATING ORAL EVERY 6 HOURS PRN
Status: DISCONTINUED | OUTPATIENT
Start: 2018-02-01 | End: 2018-02-02 | Stop reason: HOSPADM

## 2018-02-01 RX ORDER — GABAPENTIN 300 MG/1
300 CAPSULE ORAL 3 TIMES DAILY
Status: DISCONTINUED | OUTPATIENT
Start: 2018-02-01 | End: 2018-02-02 | Stop reason: HOSPADM

## 2018-02-01 RX ORDER — ONDANSETRON 4 MG/1
4 TABLET, ORALLY DISINTEGRATING ORAL EVERY 6 HOURS PRN
Status: DISCONTINUED | OUTPATIENT
Start: 2018-02-01 | End: 2018-02-01

## 2018-02-01 RX ORDER — NICOTINE POLACRILEX 4 MG
15-30 LOZENGE BUCCAL
Status: DISCONTINUED | OUTPATIENT
Start: 2018-02-01 | End: 2018-02-02 | Stop reason: HOSPADM

## 2018-02-01 RX ADMIN — Medication 0.2 MG: at 01:47

## 2018-02-01 RX ADMIN — GUANFACINE 2.5 MG: 1 TABLET ORAL at 12:15

## 2018-02-01 RX ADMIN — SODIUM CHLORIDE, POTASSIUM CHLORIDE, SODIUM LACTATE AND CALCIUM CHLORIDE: 600; 310; 30; 20 INJECTION, SOLUTION INTRAVENOUS at 13:50

## 2018-02-01 RX ADMIN — FENTANYL CITRATE 25 MCG: 50 INJECTION, SOLUTION INTRAMUSCULAR; INTRAVENOUS at 00:29

## 2018-02-01 RX ADMIN — OXYCODONE HYDROCHLORIDE 5 MG: 5 TABLET ORAL at 05:50

## 2018-02-01 RX ADMIN — Medication 0.2 MG: at 03:57

## 2018-02-01 RX ADMIN — OXYCODONE HYDROCHLORIDE 5 MG: 5 TABLET ORAL at 03:03

## 2018-02-01 RX ADMIN — GABAPENTIN 300 MG: 100 CAPSULE ORAL at 13:54

## 2018-02-01 RX ADMIN — FENTANYL CITRATE 50 MCG: 50 INJECTION, SOLUTION INTRAMUSCULAR; INTRAVENOUS at 00:10

## 2018-02-01 RX ADMIN — IBUPROFEN 400 MG: 200 TABLET, FILM COATED ORAL at 20:40

## 2018-02-01 RX ADMIN — OXYCODONE HYDROCHLORIDE 10 MG: 5 TABLET ORAL at 16:52

## 2018-02-01 RX ADMIN — OXYCODONE HYDROCHLORIDE 10 MG: 5 TABLET ORAL at 20:40

## 2018-02-01 RX ADMIN — OXYCODONE HYDROCHLORIDE 5 MG: 5 TABLET ORAL at 12:01

## 2018-02-01 RX ADMIN — IBUPROFEN 400 MG: 200 TABLET, FILM COATED ORAL at 13:54

## 2018-02-01 RX ADMIN — GABAPENTIN 300 MG: 100 CAPSULE ORAL at 06:58

## 2018-02-01 RX ADMIN — Medication 0.2 MG: at 07:38

## 2018-02-01 RX ADMIN — GABAPENTIN 300 MG: 100 CAPSULE ORAL at 20:40

## 2018-02-01 RX ADMIN — OXYCODONE HYDROCHLORIDE 5 MG: 5 TABLET ORAL at 12:15

## 2018-02-01 RX ADMIN — OXYCODONE HYDROCHLORIDE 5 MG: 5 TABLET ORAL at 06:57

## 2018-02-01 RX ADMIN — Medication 0.2 MG: at 13:52

## 2018-02-01 ASSESSMENT — PAIN DESCRIPTION - DESCRIPTORS
DESCRIPTORS: ACHING

## 2018-02-01 NOTE — PLAN OF CARE
"Problem: Patient Care Overview  Goal: Discharge Needs Assessment  Outpatient/Observation goals to be met before discharge home:  /88 (BP Location: Right arm)  Pulse 100  Temp 99.9  F (37.7  C) (Oral)  Resp 18  Ht 1.65 m (5' 4.96\")  Wt 76.8 kg (169 lb 5 oz)  SpO2 97%  BMI 28.21 kg/m2    -diagnostic tests and consults completed and resulted-YES  -vital signs normal or at patient baseline-patient is tachycardic, slight temperature-ibuprofen given  -tolerating oral intake to maintain hydration-YES  -adequate pain control on oral analgesics-NO, using IV and oral        "

## 2018-02-01 NOTE — OP NOTE
Operative Note  PRE-OP DIAGNOSIS: Acute appendicitis  POST-OP DIAGNOSIS: Acute appendicitis  PROCEDURE: Laparoscopic appendectomy  SURGEONS: Dawn Holt MD  RESIDENT: Antolin  ANESTHESIA: general endotracheal  ESTIMATED BLOOD LOSS: 25 cc  COMORBIDITIES:   Patient Active Problem List   Diagnosis     Allergic rhinitis due to other allergen     Brain lesion     Toxoplasma encephalitis (H)     Pulmonary nodules     Human immunodeficiency virus I infection (H)     Folliculitis     Prurigo nodularis     Epidermal cyst     Shoulder joint pain, unspecified laterality     CNS toxoplasmosis (H)     Constipation     Non-intractable vomiting with nausea, vomiting of unspecified type     Thrush     Insomnia, unspecified insomnia     Bowel obstruction     Toxoplasmosis     Gastroesophageal reflux disease     Headache     Aphthous ulcer     Type 2 diabetes mellitus (H)     Small bowel obstruction     SBO (small bowel obstruction)     Slow transit constipation     Periungual wart     Herpes zoster     Erectile dysfunction, unspecified erectile dysfunction type     Insomnia, unspecified type     Preventative health care     Pain of right thumb     Rupture of ulnar collateral ligament of right thumb     Aftercare following surgery of the musculoskeletal system     Panic disorder     Generalized anxiety disorder     Major depressive disorder, single episode, unspecified     Abdominal pain      FINDINGS:   SPECIMENS:   ID Type Source Tests Collected by Time Destination   A :  Tissue Appendix SURGICAL PATHOLOGY EXAM Dawn Holt MD 1/31/2018 10:06 PM         INDICATIONS: Andrew Lockwood is a 50 year old male who presented to the ED with signs and symptoms of appendicitis including an elevated white blood cell count and right lower quadrant tenderness.  CT confirmed the diagnosis.  Laparoscopic appendectomy was recommended.  Risks, benefits and alternatives were discussed and the patient and family agreed to proceed  as planned.    PROCEDURE:  The patient was brought to the Operating Room and placed in a comfortable supine position.  General endotracheal anesthesia was administered.  The abdomen was prepped and draped in a sterile fashion.  Marcaine was infiltrated and a curvilinear infraumbilical incision was created with a scalpel and carried down to the fascia.  The celom was entered under direct visualization. A 12 mm port was placed into the celom, the abdomen insufflated and a camera was inserted.  Marcaine was again infiltrated and two additional 5 mm ports were placed under direct visualization taking care to avoid the bladder and the inferior epigastric vessels.  Attention was then tuned to the appendix.  The cecum was swept away and a tubular structure that appeared to be the appendix  was grasped and elevated.  It was noted to be inflamed.  Some pus was present.  Using a harmonic scalpel, the adhesions and retroperitoneal attachments were taken down and what looked like an appendix was isolated.  The fat was divided with the harmonic scalpel.  It was clearly seen to attach to the cecum near the ileocecal valve. A Vicryl Endoloop was placed over the structure at the cecum and tightened. The structure thinned out a suspicious amount at this point.  The tail was cut. The structure was then amputated with the harmonic scalpel.  It was placed in an Endocatch bag and removed through the umbilical port. The area of the retroperitoneum where the structure was seen was irrigated and explored further.  The actual appendix was identified still present in a retrocecal position behind where the previous structure was.  The appendix was grasped and elevated.  It was noted to be gangrenous and perforated.  Some more pus was present in that bed.  Using a harmonic scalpel, the adhesions and retroperitoneal attachments were taken down and the appendix was isolated.  The mesoappendix was divided with the harmonic scalpel.  A Vicryl  Endoloop was placed over the appendix and tightened.  The tail was cut. The appendix was then amputated with the harmonic scalpel.  The appendix was  placed in an Endocatch bag and removed through the umbilical port.  It was sent to Pathology. Retrospectively, the first structure was likely an epiploic appendage. The celom was inspected and no sign of bleeding or other pathology was noted.  The abdomen was desufflated and the ports were withdrawn.  The fascia at the umbilical port was closed with Vicryl stitches.  The skin was closed at all port sites with Monocryl.  Sterile dressings were applied.  The patient tolerated the procedure well and was returned to the Recovery Room in good condition.      I was present for all key portions of the procedure.

## 2018-02-01 NOTE — OR NURSING
Patient repositioned up in bed with 2 assist.  O2 DC'd. Intern JOHN Santana notified of 0600 accucheck of 206 mg/dl. No new orders received.

## 2018-02-01 NOTE — BRIEF OP NOTE
[facility], [place]    Brief Operative Note    Pre-operative diagnosis: Acute Appendicitis  Post-operative diagnosis Same  Procedure: Procedure(s):  LAPAROSCOPIC APPENDECTOMY - Wound Class: III-Contaminated  Surgeon: Surgeon(s) and Role:     * Dawn Holt MD - Primary     * Jay Dangelo MD - Resident - Assisting  Anesthesia: General   Estimated blood loss: 10cc  Drains: None  Specimens:   ID Type Source Tests Collected by Time Destination   A :  Tissue Appendix SURGICAL PATHOLOGY EXAM Dawn Holt MD 1/31/2018 10:06 PM      Findings:   Inflamed and gangrenous gallbladder + Epiploic appendege.  Complications: None.  Implants: None.

## 2018-02-01 NOTE — OR NURSING
Assisted by 2 staff up to bedside recliner and settled.  Given diet soda and shortbread cookies.  Pain continues, worse with movement per patient report.  Watching TV.  Ice pack to R abdomen.

## 2018-02-01 NOTE — OR NURSING
Attempted to get a physical bed for patient.  Unable to locate bed.  Will remain on cart.  Encouraged to change position, and turn from side to side.

## 2018-02-01 NOTE — PROGRESS NOTES
Surgery Progress Note  2/1/2018     Subjective:  -Pt doing well overnight since OR. Complains of abdominal pain. Tolerating clears ok.    Objective:  Temp:  [98.2  F (36.8  C)-103.5  F (39.7  C)] 99.5  F (37.5  C)  Heart Rate:  [] 114  Resp:  [14-26] 14  BP: (117-150)/(80-99) 120/86  SpO2:  [94 %-99 %] 96 %  I/O last 3 completed shifts:  In: 1840 [P.O.:540; I.V.:1300]  Out: 1500 [Urine:1490; Blood:10]    Gen: Awake, alert, NAD   Resp: NLB on   Abd: Soft, ND, appropriately tender to palpation  Ext: WWP  Dressing/Incision: C/d/i     BMP  Recent Labs  Lab 01/31/18 1155 01/30/18 2039    138   POTASSIUM 3.8 3.6   CHLORIDE 101 105   LINDA 9.0 9.2   CO2 26 24   BUN 12 15   CR 0.74 0.79   * 159*     CBC  Recent Labs  Lab 01/31/18 1155 01/30/18 2039   WBC 17.1* 16.5*   RBC 4.21* 4.35*   HGB 13.5 14.0   HCT 40.1 40.9   MCV 95 94   MCH 32.1 32.2   MCHC 33.7 34.2   RDW 13.6 13.3    234     INRNo lab results found in last 7 days.   AST/ALT & Alk Phos  Recent Labs  Lab 01/31/18 1155 01/30/18 2039   AST 20 22   ALT 30 35   ALKPHOS 83 106     Bili  Recent Labs   Lab Test  01/31/18 1155 01/30/18 2039 01/23/18   2340  01/01/18   0412   07/21/16   0521   BILITOTAL  1.2  0.4  0.3  0.2   < >  0.4   DBIL   --    --    --    --    --   <0.1    < > = values in this interval not displayed.     Lipase/Amlyase  Recent Labs  Lab 01/31/18 1155 01/30/18 2039   LIPASE 150 323       A/P: Andrew Lockwood is a 50 year old male s/p appendectomy.  - Admit to observation for placement as he is homeless.  -Pain control  -ADAT  -Home meds      D/w staff, Dr. Corina Rogers DO  PGY1     Kevin Santana MD  PGY-1 Surgery

## 2018-02-01 NOTE — PHARMACY-ADMISSION MEDICATION HISTORY
Admission medication history interview status for the 1/31/2018 admission is complete. See Epic admission navigator for allergy information, pharmacy, prior to admission medications and immunization status.     Medication history interview sources:  Patient, Araceli, Chart Review    Changes made to PTA medication list (reason)  Added: None  Deleted:   - Dicyclomine 10 mg PO four times daily: Patient stated he was not taking. However, [facility] indicated the patient filled on 12/29/17 #120.   - Montelukast 10 mg at bedtime: Patient stated he was not taking. However, [facility] indicated the patient filled on 12/29/17 #30.   - Omeprazole 20 mg PO QD: Patient stated he was not taking. However, [facility] indicated the patient filled on 12/29/17 #30.   - Polyethylene glycol 17 g PO QD: Patient stated he was not taking.  - Pseudoephedrine 30 mg tablets, 2 tablets PO Q6hr PRN: Patient stated he was not taking. However, [facility] indicated the patient filled on 12/11/17 #24.     Changed: None    Additional medication history information (including reliability of information, actions taken by pharmacist):  Patient was a poor historian. He had difficulty identifying his medications, but was certain he was taking gabapentin, Genvoya, and glipizide. He reported taking no medications today. Upon reviewing his fill history, it was recognized the patient filled oxycodone 5 mg (take one tablet PO every hours prn for pain) on 1/24/18 #15. The patient did not identify oxycodone during the medication history when asked about any other medications.  Per 1/23/18 and 1/25/18 encounters, it appears the patient has medication boxed filled for him by the pharmacy. Call [phone] (RAYMOND Barrett PharmD), but was unable to make contact due to closure for the evening (8am-4 pm office hours). Also called [phone] (Lenka Mabry CPhT), but was also unable to make contact.   Patient confirmed receiving the flu shot this  season.    - Glipizide: Per 1/25/18 pharmacist note, the patient was started on glipizide and discontinued metformin.  - Maalox: Patient stated he only takes before meals when he knows he is going to have stomach pain.  - Gabapentin: Patient reports taking 4-5 times per day. However, [facility] indicates the patient should be taking only three times per day.      Prior to Admission medications    Medication Sig Last Dose Taking? Auth Provider   alum & mag hydroxide-simethicone (MAALOX ADVANCED MAX ST) 400-400-40 MG/5ML SUSP suspension Take 30 mLs by mouth every 4 hours as needed for indigestion Past Week at Unknown time Yes Torres Saenz MD   glipiZIDE (GLUCOTROL) 5 MG tablet Take 0.5-1 tablets (2.5-5 mg) by mouth daily 1/30/2018 at Unknown time Yes Osbaldo Delacruz MD   gabapentin (NEURONTIN) 300 MG capsule Take 1 capsule (300 mg) by mouth 3 times daily 1/30/2018 at Unknown time Yes Sharon Rosado MD   GENVOYA [id number] mg tablet Take 1 tablet by mouth daily 1/30/2018 at Unknown time Yes Sharon Rosado MD   blood glucose monitoring (NO BRAND SPECIFIED) meter device kit Use to test blood sugar 4 times daily or as directed.   Osbaldo Delacruz MD   blood glucose monitoring (NO BRAND SPECIFIED) test strip 1 strip by In Vitro route 4 times daily (before meals and nightly) Use to test blood sugars 4 times daily or as directed   Osbaldo Delacruz MD   blood glucose monitoring (NO BRAND SPECIFIED) meter device kit Use to test blood sugar four times daily or as directed.   Julissa Silver MD   blood glucose monitoring (NO BRAND SPECIFIED) test strip Use to test blood sugars four times daily or as directed   Julissa Silver MD   blood glucose (NO BRAND SPECIFIED) lancets standard Use to test blood sugar four times daily or as directed.   Julissa Silvre MD         Medication history completed by: Louise Cantu

## 2018-02-01 NOTE — ANESTHESIA PREPROCEDURE EVALUATION
Anesthesia Evaluation     . Pt has had prior anesthetic.            ROS/MED HX    ENT/Pulmonary:     (+)sleep apnea, , . .    Neurologic:       Cardiovascular:         METS/Exercise Tolerance:     Hematologic:         Musculoskeletal:         GI/Hepatic:     (+) GERD       Renal/Genitourinary:         Endo:         Psychiatric:         Infectious Disease:   (+) HIV,       Malignancy:         Other:                                    Anesthesia Plan      History & Physical Review  History and physical reviewed and following examination; no interval change.    ASA Status:  3 .        Plan for General, RSI and ETT with Intravenous induction. Maintenance will be Balanced.    PONV prophylaxis:  Ondansetron (or other 5HT-3) and Dexamethasone or Solumedrol       Postoperative Care  Postoperative pain management:  IV analgesics and Multi-modal analgesia.      Consents  Anesthetic plan, risks, benefits and alternatives discussed with:  Patient..                          .

## 2018-02-01 NOTE — TELEPHONE ENCOUNTER
This is a summary note related to many recent walk in visits to our clinic program. He is very well know to the outpt clinic staff and often comes in to discuss his psychosocial issues with various staff. He has been homeless again last two months due to significant complications related to his immigration status and is working with a very involved  to remedy this but it will be sometime in the future. He has also been recently assigned to a community carlee [facility] with [employer]. He has not met with her yet but [facility] is also considering assisting him with their legal program. He states that he has a current arrangement with a friend to stay with him while the [facility] housing program is successful. The outpt clinic program will continue to see him following this inpt stay.

## 2018-02-01 NOTE — ANESTHESIA POSTPROCEDURE EVALUATION
Patient: Andrew Lockwood    Procedure(s):  LAPAROSCOPIC APPENDECTOMY - Wound Class: III-Contaminated    Diagnosis:Acute Appendix  Diagnosis Additional Information: No value filed.    Anesthesia Type:  General, RSI, ETT    Note:  Anesthesia Post Evaluation    Patient location during evaluation: PACU  Patient participation: Able to fully participate in evaluation  Level of consciousness: awake  Pain management: adequate  Airway patency: patent  Cardiovascular status: acceptable  Respiratory status: acceptable  Hydration status: acceptable  PONV: none     Anesthetic complications: None          Last vitals:  Vitals:    02/01/18 0030 02/01/18 0045 02/01/18 0100   BP: (!) 136/91 136/84 129/84   Pulse:      Resp: 17  17   Temp: 38.8  C (101.9  F) 38.8  C (101.8  F) 38.9  C (102  F)   SpO2: 98% 98% 98%         Electronically Signed By: Cristóbal Pires MD  February 1, 2018  1:25 AM

## 2018-02-01 NOTE — PROGRESS NOTES
Social Work Services Progress Note    Hospital Day: 2  Date of Initial Social Work Evaluation:  1/31/18  Collaborated with:  JOMAR Lauren 6D SW; e-mail from [facility]    Data:  Received e-mail from Simon reminding writer that pt also has case management intake with [facility].  He confirms that pt has been staying with a friend and as of a few days ago, that was a safe temporary situation.  Pt is also familiar with many community resources.    Intervention:  Communication as above.    Assessment:  Care coordination; assessment of safe housing.    Plan:    Anticipated Disposition:  to be determined.    Barriers to d/c plan:  Medical stability.    Follow Up:  SW will continue to follow as needed.    ELZA Tompkins  Social Work Services  Emergency Department   [phone] phone  [phone] pager  On-call pager, [phone], 1600 to midnight

## 2018-02-01 NOTE — OR NURSING
Ambulated with assistance in room tolerated well. Fair amount of abd incisional discomfort with getting in and out of bed and chair.

## 2018-02-01 NOTE — ANESTHESIA CARE TRANSFER NOTE
Patient: Andrew Lockwood    Procedure(s):  LAPAROSCOPIC APPENDECTOMY - Wound Class: III-Contaminated    Diagnosis: Acute Appendix  Diagnosis Additional Information: No value filed.    Anesthesia Type:   General, RSI, ETT     Note:  Airway :Face Mask  Patient transferred to:PACU  Comments: Patient awake and making needs known, report to RN upon arrival to PARHandoff Report: Identifed the Patient, Identified the Reponsible Provider, Reviewed the pertinent medical history, Discussed the surgical course, Reviewed Intra-OP anesthesia mangement and issues during anesthesia, Set expectations for post-procedure period and Allowed opportunity for questions and acknowledgement of understanding      Vitals: (Last set prior to Anesthesia Care Transfer)    CRNA VITALS  1/31/2018 2225 - 1/31/2018 2302      1/31/2018             Pulse: 125    SpO2: 100 %    Resp Rate (observed): (!)  1                Electronically Signed By: Mariza Rhoades CRNA, APRN CRNA  January 31, 2018  11:02 PM

## 2018-02-02 VITALS
SYSTOLIC BLOOD PRESSURE: 151 MMHG | RESPIRATION RATE: 16 BRPM | BODY MASS INDEX: 28.21 KG/M2 | WEIGHT: 169.31 LBS | TEMPERATURE: 99.5 F | HEART RATE: 120 BPM | HEIGHT: 65 IN | DIASTOLIC BLOOD PRESSURE: 97 MMHG | OXYGEN SATURATION: 95 %

## 2018-02-02 LAB
INTERPRETATION ECG - MUSE: NORMAL
LACTATE BLD-SCNC: 0.7 MMOL/L (ref 0.4–1.9)

## 2018-02-02 PROCEDURE — 25000132 ZZH RX MED GY IP 250 OP 250 PS 637: Performed by: STUDENT IN AN ORGANIZED HEALTH CARE EDUCATION/TRAINING PROGRAM

## 2018-02-02 PROCEDURE — 25000128 H RX IP 250 OP 636: Performed by: STUDENT IN AN ORGANIZED HEALTH CARE EDUCATION/TRAINING PROGRAM

## 2018-02-02 PROCEDURE — 36415 COLL VENOUS BLD VENIPUNCTURE: CPT | Performed by: SURGERY

## 2018-02-02 PROCEDURE — 83605 ASSAY OF LACTIC ACID: CPT | Performed by: SURGERY

## 2018-02-02 PROCEDURE — G0378 HOSPITAL OBSERVATION PER HR: HCPCS

## 2018-02-02 RX ADMIN — SODIUM CHLORIDE, POTASSIUM CHLORIDE, SODIUM LACTATE AND CALCIUM CHLORIDE: 600; 310; 30; 20 INJECTION, SOLUTION INTRAVENOUS at 08:21

## 2018-02-02 RX ADMIN — IBUPROFEN 400 MG: 200 TABLET, FILM COATED ORAL at 02:33

## 2018-02-02 RX ADMIN — OXYCODONE HYDROCHLORIDE 10 MG: 5 TABLET ORAL at 00:27

## 2018-02-02 RX ADMIN — ELVITEGRAVIR, COBICISTAT, EMTRICITABINE, AND TENOFOVIR ALAFENAMIDE 1 TABLET: 150; 150; 200; 10 TABLET ORAL at 08:17

## 2018-02-02 RX ADMIN — SODIUM CHLORIDE, POTASSIUM CHLORIDE, SODIUM LACTATE AND CALCIUM CHLORIDE 250 ML: 600; 310; 30; 20 INJECTION, SOLUTION INTRAVENOUS at 02:34

## 2018-02-02 RX ADMIN — GUANFACINE 5 MG: 1 TABLET ORAL at 08:17

## 2018-02-02 RX ADMIN — OXYCODONE HYDROCHLORIDE 10 MG: 5 TABLET ORAL at 12:44

## 2018-02-02 RX ADMIN — OXYCODONE HYDROCHLORIDE 10 MG: 5 TABLET ORAL at 04:57

## 2018-02-02 RX ADMIN — IBUPROFEN 400 MG: 200 TABLET, FILM COATED ORAL at 14:15

## 2018-02-02 RX ADMIN — GABAPENTIN 300 MG: 100 CAPSULE ORAL at 14:15

## 2018-02-02 RX ADMIN — GABAPENTIN 300 MG: 100 CAPSULE ORAL at 08:17

## 2018-02-02 NOTE — PLAN OF CARE
Problem: Patient Care Overview  Goal: Discharge Needs Assessment  Outpatient/Observation goals to be met before discharge home:    1.  Diagnostic tests and consults completed and resulted:  YES, lap appy on 1/31; pt also (+) RSV; placed on contact and droplet precautions  2.  Vital signs normal or at pt baseline:  NO, T 102.0, 's; notified gen surgery  3.  Tolerating oral intake to maintain hydration:  YES, pt taking po fluids with no c/o nausea; pt abd sl distended, hypoactive bowel sounds; pt denies passing any flatus; ambulatory in cardenas; start miralax in AM  4.  Adequate pain control on oral analgesic:  YES, taking roxicodone q4hrs  Nurse to notify MD when observation goals have been met and pt is ready for discharge.

## 2018-02-02 NOTE — PROGRESS NOTES
Temp 99.9, -130; started 250cc bolus of LR per surgery.  Medicated with motrin for continued c/o pain.  Voiding in good amounts; taking po fluids.

## 2018-02-02 NOTE — PLAN OF CARE
"Problem: Patient Care Overview  Goal: Plan of Care/Patient Progress Review  1.  Diagnostic tests and consults completed and resulted:  YES, lap appy on 1/31; pt also (+) RSV; on contact and droplet precautions  2.  Vital signs normal or at pt baseline:  NO, T 98.2, HR mid-115  3.  Tolerating oral intake to maintain hydration:  YES, pt taking po fluids with no c/o nausea; pt abd remains distended, hypoactive bowel sounds; pt denies passing any flatus; ambulatory in cardenas;refused miralax  4.  Adequate pain control on oral analgesic:  YES, taking roxicodone q4hrs  Nurse to notify MD when observation goals have been met and pt is ready for discharge.  /85 (BP Location: Left arm)  Pulse 115  Temp 98.2  F (36.8  C) (Oral)  Resp 16  Ht 1.65 m (5' 4.96\")  Wt 76.8 kg (169 lb 5 oz)  SpO2 93%  BMI 28.21 kg/m2                  "

## 2018-02-02 NOTE — PROGRESS NOTES
Text paged general surgery #[phone] Nancy Morales [phone]  Pt inquiring about d/c today.   Thanks,  Awaiting response

## 2018-02-02 NOTE — DISCHARGE SUMMARY
"[facility]  Discharge Summary  General Surgery     Andrew Lockwood MRN# [id number]   YOB: 1967 Age: 50 year old     Date of Admission:  1/31/2018  Date of Discharge::  2/2/2018  Admitting Physician:  Susannah Arriaga MD  Discharge Physician:  Dr. Ingrid Beltran  Primary Care Physician:        Sharon Rosado          Admission Diagnoses:   Acute appendicitis, unspecified acute appendicitis type [K35.80]            Discharge Diagnosis:   S/p appendectomy         Procedures:   Procedure(s):  LAPAROSCOPIC APPENDECTOMY - Wound Class: III-Contaminated  Surgeon: Dr. Dawn Holt  Date: 1/31/18          Brief History of Illness:   Taken from Admission H&P:  \"50 year old male with history of HIV, CMB, CNS toxoplasmosis, DM II, DANISHA,  prior negative diagnostic laparoscopy who presented on 1/30 with abdominal pain.  Pain started yesterday morning that was diffuse and has now migrated to the RLQ. Pain has been progressively worsening, elevated with movement. Temp to 100.3F +Nausea and emesis yesterday, improved today. Last bowel movement yesterday. Nothing to eat today. WBC 17.1.  CT imaging demonstrating inflamed appendix with appendicolith and periappendiceal fat stranding. Underwent a diagnostic laparoscopy with Dr. Arriaga on 7/26/17 that was negative, with GI symptoms thought to be attributable to viral gastroenteritis. Last EGD on 1/8/16 showing LA grade A reflux esophagitis. Colonoscopy at that time was normal.\"           Hospital Course:   Pt was not discharged directly from PACU as is standard due to not having available adult supervision for 24 hours post-op. He was transferred to the observation unit and was deemed fit for discharge on POD1--tolerating a regular diet, vitals stable, voiding, ambulating, and pain well controlled on PO pain meds.           Day of Discharge Physical Exam:   Blood pressure (!) 151/97, pulse 120, temperature 99.5  F (37.5  C), " "temperature source Oral, resp. rate 16, height 1.65 m (5' 4.96\"), weight 76.8 kg (169 lb 5 oz), SpO2 95 %.    Gen: AAOx3, NAD  Pulm: Non-labored breathing  Abd: Soft, appropriately tender, no guarding   Incision C/D/I with dermabond  Ext:  Warm and well-perfused         Final Pathology Result:   Pending at time of discharge           Discharge Instructions and Follow-Up:     Discharge Procedure Orders  No lifting    Order Comments: No lifting over 15 lbs and no strenuous physical activity for 4 weeks     No Alcohol   Order Comments: For 24 hours post procedure     No driving or operating machinery    Order Comments: until the day after procedure     Diet Instructions   Order Comments: Resume pre-procedure diet     Shower   Order Comments: No shower for 24 hours post procedure. May shower Postoperative Day (POD)  1     Discharge Instructions   Order Comments: Follow up appointment as instructed by Surgeon and or RN    WOUND -Inspect your wounds daily for signs of infection (increased redness, drainage, pain)  -Keep your wound clean and dry  -You may shower, but do not soak in tub or pool    NOTIFY  Please contact General Surgery Clinic if you develop any of the following:  -Temperature > 101F, chills, rigors, dizziness  -Redness around or purulent drainage from wound  -Inability to tolerate diet, nausea or vomiting  --Have severe diarrhea/constipation  -Any other questions or concerns.  - At nights (after 4:30pm), on weekends, or if urgent, call [phone] and ask the  to speak with the on-call General Surgery resident or fellow    FOLLOW-UP  1.  General Surgery clinic will call in several days to assess recovery after surgery. If you wish to see a provider to discuss recovery further, you can schedule an appointment at that time.  2.  Follow up with your primary care provider in 1-2 weeks after discharge from the hospital to review this hospitalization.       *For other appointments on University and/or " [facility] (with [facility] or [facility] provider or service): Call [phone] if you have not heard regarding these appointments within 7 days of discharge.              Home Health Care:   Not needed           Discharge Disposition:   Discharged to shelter      Condition at discharge: Stable         Consultations:   MEDICATION HISTORY IP PHARMACY CONSULT         Imaging Studies:     Results for orders placed or performed during the hospital encounter of 01/31/18   CT Abdomen Pelvis w Contrast     Value    Radiologist flags Acute appendicitis. (Urgent)    Narrative    EXAMINATION: CT ABDOMEN PELVIS W CONTRAST, 1/31/2018 1:27 PM    TECHNIQUE:  Helical CT images from the lung bases through the  symphysis pubis were obtained with IV contrast. Contrast dose: 104 cc  of isocue 370    COMPARISON: 11/7/2017.    HISTORY: RLQ pain.     FINDINGS:    Lung bases: No pleural effusions. Mild bilateral lower lobes dependent  atelectasis. Motion artifact. Poorly characterized 5 mm groundglass  nodule in the right middle lobe, series 5 image 8, unchanged from the  4/9/2015 CT exam.     Abdomen and pelvis: Appendicoliths. Dilated appendix measuring up to  14 mm with mucosal hyperemia. Periappendiceal inflammatory changes  with fat stranding and minimal periappendiceal fluid. No free air. No  abscess. Otherwise, no dilated loops of bowel and no bowel wall  thickening.    No suspicious liver lesions. Focal fat deposition along the falciform  ligament. 7 mm hypoattenuating focus in hepatic segment 5, previously  measuring 17 mm on CT exam 4/9/2015 previously characterized as a  liver cyst on ultrasound 1/8/2016. No biliary tree dilation. Patent  hepatic vasculature. Unremarkable gallbladder. Homogeneous pancreatic  parenchyma. Main pancreatic duct is not dilated. The spleen is not  enlarged. No focal lesions. Adrenal glands are unremarkable. No renal  stones or hydronephrosis bilaterally. The urinary bladder is well  distended and  unremarkable in appearance. Symmetric seminal vesicles.  The prostate measures 5.4 cm in greatest transverse diameter. Penile  prosthesis. No inguinal lymphadenopathy. Subcentimeter pelvic lymph  nodes as well as about the right lower quadrant, likely reactive.  Subcentimeter retroperitoneal and mesenteric lymph nodes, not enlarged  by size criteria. No abdominal aortic aneurysm. Mild atherosclerotic  calcifications of the abdominal aorta and its major branches.  Decompressed stomach.    Bones: Mild enthesopathic changes off the pelvis. Mild degenerative  changes of the spine. Scattered Schmorl's nodes. Scattered intradiscal  gas.      Impression    IMPRESSION:   1. In this patient with right lower quadrant pain there are CT  findings of acute appendicitis with dilated appendix, appendicolith,  and periappendiceal inflammatory changes. No free air. No abscess  formation.  2. Poorly characterized 5 mm groundglass nodule in the right middle  lobe stable from the 4/9/2015 CT exam.     [Access Center: Acute appendicitis.]    This report will be copied to the [facility] to ensure a  provider acknowledges the finding. Access Center is available Monday  through Friday 8am-3:30 pm.     I have personally reviewed the examination and initial interpretation  and I agree with the findings.    ALFREDA SHIPLEY MD              Medications Prior to Admission:     Prescriptions Prior to Admission   Medication Sig Dispense Refill Last Dose     alum & mag hydroxide-simethicone (MAALOX ADVANCED MAX ST) 400-400-40 MG/5ML SUSP suspension Take 30 mLs by mouth every 4 hours as needed for indigestion 355 mL 0 Past Week at Unknown time     blood glucose monitoring (NO BRAND SPECIFIED) meter device kit Use to test blood sugar 4 times daily or as directed. 1 kit 0 Past Week at Unknown time     blood glucose monitoring (NO BRAND SPECIFIED) test strip 1 strip by In Vitro route 4 times daily (before meals and nightly) Use to test  blood sugars 4 times daily or as directed 100 strip 11 Past Week at Unknown time     glipiZIDE (GLUCOTROL) 5 MG tablet Take 0.5-1 tablets (2.5-5 mg) by mouth daily 30 tablet 11 1/30/2018 at 0800     gabapentin (NEURONTIN) 300 MG capsule Take 1 capsule (300 mg) by mouth 3 times daily 90 capsule 5 1/30/2018 at 0800     GENVOYA [phone] mg tablet Take 1 tablet by mouth daily 30 tablet 5 1/30/2018 at 0800     blood glucose monitoring (NO BRAND SPECIFIED) meter device kit Use to test blood sugar four times daily or as directed. 1 kit 0 Past Week at Unknown time     blood glucose monitoring (NO BRAND SPECIFIED) test strip Use to test blood sugars four times daily or as directed 100 each 5 Past Week at Unknown time     blood glucose (NO BRAND SPECIFIED) lancets standard Use to test blood sugar four times daily or as directed. 100 Box 5 Past Week at Unknown time              Discharge Medications:     Current Discharge Medication List      START taking these medications    Details   polyethylene glycol (MIRALAX/GLYCOLAX) Packet Take 17 g by mouth daily as needed for constipation  Qty: 10 packet, Refills: 0    Associated Diagnoses: S/P appendectomy      ibuprofen (ADVIL/MOTRIN) 200 MG tablet Take 1-2 tablets (200-400 mg) by mouth every 6 hours as needed for moderate pain  Qty: 40 tablet, Refills: 0    Associated Diagnoses: S/P appendectomy      oxyCODONE IR (ROXICODONE) 5 MG tablet Take 1 tablet (5 mg) by mouth every 4 hours as needed for pain maximum 6 tablet(s) per day  Qty: 15 tablet, Refills: 0    Associated Diagnoses: Acute appendicitis, unspecified acute appendicitis type         CONTINUE these medications which have NOT CHANGED    Details   alum & mag hydroxide-simethicone (MAALOX ADVANCED MAX ST) 400-400-40 MG/5ML SUSP suspension Take 30 mLs by mouth every 4 hours as needed for indigestion  Qty: 355 mL, Refills: 0      !! blood glucose monitoring (NO BRAND SPECIFIED) meter device kit Use to test blood sugar 4  times daily or as directed.  Qty: 1 kit, Refills: 0    Associated Diagnoses: Type 2 diabetes mellitus with complication, without long-term current use of insulin (H)      !! blood glucose monitoring (NO BRAND SPECIFIED) test strip 1 strip by In Vitro route 4 times daily (before meals and nightly) Use to test blood sugars 4 times daily or as directed  Qty: 100 strip, Refills: 11    Associated Diagnoses: Type 2 diabetes mellitus with complication, without long-term current use of insulin (H)      glipiZIDE (GLUCOTROL) 5 MG tablet Take 0.5-1 tablets (2.5-5 mg) by mouth daily  Qty: 30 tablet, Refills: 11    Associated Diagnoses: Type 2 diabetes mellitus with complication, without long-term current use of insulin (H)      gabapentin (NEURONTIN) 300 MG capsule Take 1 capsule (300 mg) by mouth 3 times daily  Qty: 90 capsule, Refills: 5    Associated Diagnoses: Herpes zoster without complication      GENVOYA [phone] mg tablet Take 1 tablet by mouth daily  Qty: 30 tablet, Refills: 5    Associated Diagnoses: Human immunodeficiency virus (HIV) disease      !! blood glucose monitoring (NO BRAND SPECIFIED) meter device kit Use to test blood sugar four times daily or as directed.  Qty: 1 kit, Refills: 0    Associated Diagnoses: Type 2 diabetes mellitus without complication, without long-term current use of insulin (H)      !! blood glucose monitoring (NO BRAND SPECIFIED) test strip Use to test blood sugars four times daily or as directed  Qty: 100 each, Refills: 5    Associated Diagnoses: Type 2 diabetes mellitus without complication, without long-term current use of insulin (H)      blood glucose (NO BRAND SPECIFIED) lancets standard Use to test blood sugar four times daily or as directed.  Qty: 100 Box, Refills: 5    Associated Diagnoses: Type 2 diabetes mellitus without complication, without long-term current use of insulin (H)       !! - Potential duplicate medications found. Please discuss with provider.          Pt  was seen and discussed with Dr. Beltran on day of discharge    Kevin Santana MD  PGY-1 Surgery

## 2018-02-02 NOTE — PROGRESS NOTES
"02/02/2018  General Surgery Cross Cover Note    Was called to bedside to evaluate patient Andrew Lockwood for fever to 102. Patient is POD#1 from a lap appendectomy. Has been persistently tachycardic. Patient denies chest pain, SOB, nausea vomiting, and is urinating without difficulty.    Exam:   BP (!) 130/91 (BP Location: Left arm)  Pulse 120  Temp 102  F (38.9  C) (Oral)  Resp 16  Ht 1.65 m (5' 4.96\")  Wt 76.8 kg (169 lb 5 oz)  SpO2 92%  BMI 28.21 kg/m2  General: Alert, interactive, & in NAD  Resp: CTAB, no crackles or wheezes  Cardiac: Regular rate; extremities warm;   Abdomen: Soft, nontender, nondistended.  Incisions: c/d/i without erythema, warmth, or discharge.   Extremities: No LE edema or obvious joint abnormalities  Skin: Warm and dry, no jaundice or rash    I/O last 3 completed shifts:  In: 1040 [P.O.:540; I.V.:500]  Out: 1390 [Urine:1390]    Assessment:  Andrew Lockwood is a 50 year old male POD # 1 s/p laparoscopic appendectomy. No acute changes.     Plan:   cc bolus  Can give PRN Motrin 400 mg now.   Will continue to monitor    Venu Ryan DO   General Surgery PGY1  [phone]  (After 6AM please page primary team)  "

## 2018-02-02 NOTE — PROGRESS NOTES
Discharge instruction reviewed.  Patient verbalized understanding. PIV removed, patient ambulated to the main lobby accompanied by friend.Patient discharged

## 2018-02-02 NOTE — PLAN OF CARE
Problem: Patient Care Overview  Goal: Plan of Care/Patient Progress Review  Outpatient/Observation goals to be met before discharge home:    Observation goals PRIOR TO DISCHARGE     Comments: -diagnostic tests and consults completed and resulted   -vital signs normal or at patient baseline  yes  -tolerating oral intake to maintain hydration yes  -adequate pain control on oral analgesics  yes  Nurse to notify provider when observation goals have been met and patient is ready for discharge.

## 2018-02-02 NOTE — PROGRESS NOTES
"SW consulted by bedside RN for community/housing resources for pt.  Pt stating he would like help with finding an apartment in [place], [place].  Pt states that he is currently working with [employer] and they will assist with the down payment for rental.  Pt states his concern is not being given a chance to rent due to \"unemployment\".  Pt states \"they will think I'll bail\" and is concerned as he states he would not \"bail\" from an apartment.  SW will hand off referral to clinic SW as pt is discharging this morning from OBS unit.  Pt aware of referral and knows how to contact clinic SW Simon Cadena.    CRISTAL Hull, APSW  6C Unit  - coverage for 6D  Phone: [phone]  Pager: [phone]  Unit: [phone]      "

## 2018-02-02 NOTE — PLAN OF CARE
Problem: Patient Care Overview  Goal: Discharge Needs Assessment  Outpatient/Observation goals to be met before discharge home:  1.  Diagnostic tests and consults completed and resulted:  YES, lap appy on 1/31; pt also (+) RSV; placed on contact and droplet precautions  2.  Vital signs normal or at pt baseline:  NO, T 98.8, HR mid-120's  3.  Tolerating oral intake to maintain hydration:  YES, pt taking po fluids with no c/o nausea; pt abd remains distended, hypoactive bowel sounds; pt denies passing any flatus; ambulatory in cardenas; start miralax in AM  4.  Adequate pain control on oral analgesic:  YES, taking roxicodone q4hrs  Nurse to notify MD when observation goals have been met and pt is ready for discharge.

## 2018-02-03 ENCOUNTER — HOSPITAL ENCOUNTER (EMERGENCY)
Facility: CLINIC | Age: 53
Discharge: HOME | End: 2018-02-03
Attending: EMERGENCY MEDICINE | Admitting: EMERGENCY MEDICINE
Payer: COMMERCIAL

## 2018-02-03 VITALS
HEIGHT: 65 IN | SYSTOLIC BLOOD PRESSURE: 114 MMHG | HEART RATE: 94 BPM | RESPIRATION RATE: 18 BRPM | TEMPERATURE: 98 F | DIASTOLIC BLOOD PRESSURE: 84 MMHG | OXYGEN SATURATION: 100 %

## 2018-02-03 DIAGNOSIS — Z76.5 DRUG-SEEKING BEHAVIOR: ICD-10-CM

## 2018-02-03 DIAGNOSIS — R10.84 ABDOMINAL PAIN, GENERALIZED: ICD-10-CM

## 2018-02-03 LAB
ALBUMIN SERPL-MCNC: 2.4 G/DL (ref 3.4–5)
ALP SERPL-CCNC: 148 U/L (ref 40–150)
ALT SERPL W P-5'-P-CCNC: 25 U/L (ref 0–70)
ANION GAP SERPL CALCULATED.3IONS-SCNC: 10 MMOL/L (ref 3–14)
AST SERPL W P-5'-P-CCNC: 28 U/L (ref 0–45)
BASOPHILS # BLD AUTO: 0 10E9/L (ref 0–0.2)
BASOPHILS NFR BLD AUTO: 0.1 %
BILIRUB SERPL-MCNC: 1.4 MG/DL (ref 0.2–1.3)
BUN SERPL-MCNC: 11 MG/DL (ref 7–30)
CALCIUM SERPL-MCNC: 8.1 MG/DL (ref 8.5–10.1)
CHLORIDE SERPL-SCNC: 98 MMOL/L (ref 94–109)
CO2 SERPL-SCNC: 25 MMOL/L (ref 20–32)
CREAT SERPL-MCNC: 0.64 MG/DL (ref 0.66–1.25)
DIFFERENTIAL METHOD BLD: ABNORMAL
EOSINOPHIL # BLD AUTO: 0.1 10E9/L (ref 0–0.7)
EOSINOPHIL NFR BLD AUTO: 1.5 %
ERYTHROCYTE [DISTWIDTH] IN BLOOD BY AUTOMATED COUNT: 13 % (ref 10–15)
GFR SERPL CREATININE-BSD FRML MDRD: >90 ML/MIN/1.7M2
GLUCOSE SERPL-MCNC: 200 MG/DL (ref 70–99)
HCT VFR BLD AUTO: 33.4 % (ref 40–53)
HGB BLD-MCNC: 11.3 G/DL (ref 13.3–17.7)
IMM GRANULOCYTES # BLD: 0 10E9/L (ref 0–0.4)
IMM GRANULOCYTES NFR BLD: 0.2 %
LYMPHOCYTES # BLD AUTO: 1 10E9/L (ref 0.8–5.3)
LYMPHOCYTES NFR BLD AUTO: 11.1 %
MCH RBC QN AUTO: 31.7 PG (ref 26.5–33)
MCHC RBC AUTO-ENTMCNC: 33.8 G/DL (ref 31.5–36.5)
MCV RBC AUTO: 94 FL (ref 78–100)
MONOCYTES # BLD AUTO: 0.7 10E9/L (ref 0–1.3)
MONOCYTES NFR BLD AUTO: 8.2 %
NEUTROPHILS # BLD AUTO: 7.1 10E9/L (ref 1.6–8.3)
NEUTROPHILS NFR BLD AUTO: 78.9 %
NRBC # BLD AUTO: 0 10*3/UL
NRBC BLD AUTO-RTO: 0 /100
PLATELET # BLD AUTO: 220 10E9/L (ref 150–450)
POTASSIUM SERPL-SCNC: 3.2 MMOL/L (ref 3.4–5.3)
PROT SERPL-MCNC: 6.7 G/DL (ref 6.8–8.8)
RBC # BLD AUTO: 3.56 10E12/L (ref 4.4–5.9)
SODIUM SERPL-SCNC: 132 MMOL/L (ref 133–144)
WBC # BLD AUTO: 8.9 10E9/L (ref 4–11)

## 2018-02-03 PROCEDURE — 80053 COMPREHEN METABOLIC PANEL: CPT | Performed by: EMERGENCY MEDICINE

## 2018-02-03 PROCEDURE — 25000132 ZZH RX MED GY IP 250 OP 250 PS 637: Performed by: EMERGENCY MEDICINE

## 2018-02-03 PROCEDURE — 99283 EMERGENCY DEPT VISIT LOW MDM: CPT | Mod: Z6 | Performed by: EMERGENCY MEDICINE

## 2018-02-03 PROCEDURE — 85025 COMPLETE CBC W/AUTO DIFF WBC: CPT | Performed by: EMERGENCY MEDICINE

## 2018-02-03 PROCEDURE — 99283 EMERGENCY DEPT VISIT LOW MDM: CPT | Performed by: EMERGENCY MEDICINE

## 2018-02-03 RX ORDER — POTASSIUM CHLORIDE 20MEQ/15ML
40 LIQUID (ML) ORAL ONCE
Status: COMPLETED | OUTPATIENT
Start: 2018-02-03 | End: 2018-02-03

## 2018-02-03 RX ORDER — IBUPROFEN 800 MG/1
800 TABLET, FILM COATED ORAL ONCE
Status: COMPLETED | OUTPATIENT
Start: 2018-02-03 | End: 2018-02-03

## 2018-02-03 RX ORDER — ACETAMINOPHEN 500 MG
1000 TABLET ORAL ONCE
Status: COMPLETED | OUTPATIENT
Start: 2018-02-03 | End: 2018-02-03

## 2018-02-03 RX ADMIN — ACETAMINOPHEN 1000 MG: 500 TABLET, FILM COATED ORAL at 19:39

## 2018-02-03 RX ADMIN — IBUPROFEN 800 MG: 800 TABLET ORAL at 19:39

## 2018-02-03 RX ADMIN — POTASSIUM CHLORIDE 40 MEQ: 20 SOLUTION ORAL at 20:00

## 2018-02-03 ASSESSMENT — ENCOUNTER SYMPTOMS
NERVOUS/ANXIOUS: 1
CHILLS: 0
WOUND: 0
ABDOMINAL PAIN: 1
CONSTIPATION: 0
BLOOD IN STOOL: 0
NAUSEA: 0
POLYDIPSIA: 0
FEVER: 0
DIARRHEA: 1

## 2018-02-03 NOTE — ED AVS SNAPSHOT
[facility] [place], Emergency Department    [address] [place] [address]    Phone:  [phone]                                       Andrew Lockwood   MRN: [id number]    Department:  [facility], Emergency Department   Date of Visit:  2/3/2018           After Visit Summary Signature Page     I have received my discharge instructions, and my questions have been answered. I have discussed any challenges I see with this plan with the nurse or doctor.    ..........................................................................................................................................  Patient/Patient Representative Signature      ..........................................................................................................................................  Patient Representative Print Name and Relationship to Patient    ..................................................               ................................................  Date                                            Time    ..........................................................................................................................................  Reviewed by Signature/Title    ...................................................              ..............................................  Date                                                            Time

## 2018-02-03 NOTE — ED AVS SNAPSHOT
[facility], Emergency Department    [address] [facility] [address]    Phone:  [phone]                                       Andrew Lockwood   MRN: [id number]    Department:  [facility], Emergency Department   Date of Visit:  2/3/2018           Patient Information     Date Of Birth          11/27/1967        Your diagnoses for this visit were:     Abdominal pain, generalized     Drug-seeking behavior        You were seen by Kofi Esparza MD.      Follow-up Information     Please follow up.    Contact information:    Follow up in [facility]        Discharge Instructions         Abdominal Pain    Abdominal pain is pain in the stomach or belly area. Everyone has this pain from time to time. In many cases it goes away on its own. But abdominal pain can sometimes be due to a serious problem, such as appendicitis. So it s important to know when to seek help.  Causes of abdominal pain  There are many possible causes of abdominal pain. Common causes in adults include:    Constipation, diarrhea, or gas    Stomach acid flowing back up into the esophagus (acid reflux or heartburn)    Severe acid reflux, called GERD (gastroesophageal reflux disease)    A sore in the lining of the stomach or small intestine (peptic ulcer)    Inflammation of the gallbladder, liver, or pancreas    Gallstones or kidney stones    Appendicitis     Intestinal blockage     An internal organ pushing through a muscle or other tissue (hernia)    Urinary tract infections    In women, menstrual cramps, fibroids, or endometriosis    Inflammation or infection of the intestines  Diagnosing the cause of abdominal pain  Your healthcare provider will do a physical exam help find the cause of your pain. If needed, tests will be ordered. Belly pain has many possible causes. So it can be hard to find the reason for your pain. Giving details about your pain can help. Tell your provider where and when you feel the pain, and what  makes it better or worse. Also let your provider know if you have other symptoms such as:    Fever    Tiredness    Upset stomach (nausea)    Vomiting    Changes in bathroom habits  Treating abdominal pain  Some causes of pain need emergency medical treatment right away. These include appendicitis or a bowel blockage. Other problems can be treated with rest, fluids, or medicines. Your healthcare provider can give you specific instructions for treatment or self-care based on what is causing your pain.  If you have vomiting or diarrhea, sip water or other clear fluids. When you are ready to eat solid foods again, start with small amounts of easy-to-digest, low-fat foods. These include apple sauce, toast, or crackers.   When to seek medical care  Call 911 or go to the hospital right away if you:    Can t pass stool and are vomiting    Are vomiting blood or have bloody diarrhea or black, tarry diarrhea    Have chest, neck, or shoulder pain    Feel like you might pass out    Have pain in your shoulder blades with nausea    Have sudden, severe belly pain    Have new, severe pain unlike any you have felt before    Have a belly that is rigid, hard, and tender to touch  Call your healthcare provider if you have:    Pain for more than 5 days    Bloating for more than 2 days    Diarrhea for more than 5 days    A fever of 100.4 F (38.0 C) or higher, or as directed by your provider    Pain that gets worse    Weight loss for no reason    Continued lack of appetite    Blood in your stool  How to prevent abdominal pain  Here are some tips to help prevent abdominal pain:    Eat smaller amounts of food at one time.    Avoid greasy, fried, or other high-fat foods.    Avoid foods that give you gas.    Exercise regularly.    Drink plenty of fluids.  To help prevent GERD symptoms:    Quit smoking.    Reduce alcohol and certain foods that increase stomach acid.    Avoid aspirin and over-the-counter pain and fever medicines (NSAIDS or  nonsteroidal anti-inflammatory drugs), if possible    Lose extra weight.    Finish eating at least 2 hours before you go to bed or lie down.    Raise the head of your bed.  Date Last Reviewed: 7/1/2016 2000-2017 [employer]. [address] [place] [address], [place] [address]. All rights reserved. This information is not intended as a substitute for professional medical care. Always follow your healthcare professional's instructions.          Future Appointments        Provider Department Dept Phone Center    2/20/2018 3:20 PM Aguila Ingram MD [facility] Orthopaedic [facility] [phone] [facility]    3/9/2018 10:30 AM Evin Zamorano MD [facility] Orthopaedic Clinic [phone] [facility]    4/6/2018 8:30 AM Jose Ramon Singh MD [facility] Dermatology [phone] [facility]      24 Hour Appointment Hotline       To make an appointment at any [facility], call [phone] ([phone]). If you don't have a family doctor or clinic, we will help you find one. [facility] are conveniently located to serve the needs of you and your family.             Review of your medicines      Our records show that you are taking the medicines listed below. If these are incorrect, please call your family doctor or clinic.        Dose / Directions Last dose taken    alum & mag hydroxide-simethicone 400-400-40 MG/5ML Susp suspension   Commonly known as:  MAALOX ADVANCED MAX ST   Dose:  30 mL   Quantity:  355 mL        Take 30 mLs by mouth every 4 hours as needed for indigestion   Refills:  0        blood glucose lancets standard   Commonly known as:  no brand specified   Quantity:  100 Box        Use to test blood sugar four times daily or as directed.   Refills:  5        * blood glucose monitoring meter device kit   Commonly known as:  no brand specified   Quantity:  1 kit        Use to test blood sugar four times daily or as directed.   Refills:  0        * blood glucose monitoring meter device kit   Commonly known  as:  no brand specified   Quantity:  1 kit        Use to test blood sugar 4 times daily or as directed.   Refills:  0        * blood glucose monitoring test strip   Commonly known as:  no brand specified   Quantity:  100 each        Use to test blood sugars four times daily or as directed   Refills:  5        * blood glucose monitoring test strip   Commonly known as:  no brand specified   Dose:  1 strip   Quantity:  100 strip        1 strip by In Vitro route 4 times daily (before meals and nightly) Use to test blood sugars 4 times daily or as directed   Refills:  11        Iasdljt-Clyhh-Canquqag-TenofAF 050-469-965-10 MG Tabs per tablet   Commonly known as:  GENVOYA   Dose:  1 tablet   Quantity:  30 tablet        Take 1 tablet by mouth daily   Refills:  5        gabapentin 300 MG capsule   Commonly known as:  NEURONTIN   Dose:  300 mg   Quantity:  90 capsule        Take 1 capsule (300 mg) by mouth 3 times daily   Refills:  5        glipiZIDE 5 MG tablet   Commonly known as:  GLUCOTROL   Dose:  2.5-5 mg   Quantity:  30 tablet        Take 0.5-1 tablets (2.5-5 mg) by mouth daily   Refills:  11        ibuprofen 200 MG tablet   Commonly known as:  ADVIL/MOTRIN   Dose:  200-400 mg   Quantity:  40 tablet        Take 1-2 tablets (200-400 mg) by mouth every 6 hours as needed for moderate pain   Refills:  0        oxyCODONE IR 5 MG tablet   Commonly known as:  ROXICODONE   Dose:  5 mg   Quantity:  15 tablet        Take 1 tablet (5 mg) by mouth every 4 hours as needed for pain maximum 6 tablet(s) per day   Refills:  0        polyethylene glycol Packet   Commonly known as:  MIRALAX/GLYCOLAX   Dose:  17 g   Quantity:  10 packet        Take 17 g by mouth daily as needed for constipation   Refills:  0        * Notice:  This list has 4 medication(s) that are the same as other medications prescribed for you. Read the directions carefully, and ask your doctor or other care provider to review them with you.            Procedures and  tests performed during your visit     CBC with platelets differential    Comprehensive metabolic panel      Orders Needing Specimen Collection     None      Pending Results     No orders found from 2/1/2018 to 2/4/2018.            Pending Culture Results     No orders found from 2/1/2018 to 2/4/2018.            Pending Results Instructions     If you had any lab results that were not finalized at the time of your Discharge, you can call the ED Lab Result RN at [phone]. You will be contacted by this team for any positive Lab results or changes in treatment. The nurses are available 7 days a week from 10A to 6:30P.  You can leave a message 24 hours per day and they will return your call.        Thank you for choosing [facility]       Thank you for choosing [facility] for your care. Our goal is always to provide you with excellent care. Hearing back from our patients is one way we can continue to improve our services. Please take a few minutes to complete the written survey that you may receive in the mail after you visit with us. Thank you!        [employer] Information     [employer] gives you secure access to your electronic health record. If you see a primary care provider, you can also send messages to your care team and make appointments. If you have questions, please call your primary care clinic.  If you do not have a primary care provider, please call [phone] and they will assist you.        Care EveryWhere ID     This is your Care EveryWhere ID. This could be used by other organizations to access your [facility] medical records  [id number]        Equal Access to Services     RA JORDAN : Hadii carissa Cody, maverick centeno, qaybta dez mcdowell . So [facility] [phone].    ATENCIÓN: Si habla español, tiene a kohli disposición servicios gratuitos de asistencia lingüística. Llame al [phone].    We comply with applicable federal civil rights laws and  [place] laws. We do not discriminate on the basis of race, color, national origin, age, disability, sex, sexual orientation, or gender identity.            After Visit Summary       This is your record. Keep this with you and show to your community pharmacist(s) and doctor(s) at your next visit.

## 2018-02-04 ENCOUNTER — HOSPITAL ENCOUNTER (INPATIENT)
Facility: CLINIC | Age: 53
LOS: 4 days | Discharge: HOME | End: 2018-02-08
Attending: EMERGENCY MEDICINE | Admitting: SURGERY
Payer: COMMERCIAL

## 2018-02-04 ENCOUNTER — APPOINTMENT (OUTPATIENT)
Dept: CT IMAGING | Facility: CLINIC | Age: 53
End: 2018-02-04
Attending: EMERGENCY MEDICINE
Payer: COMMERCIAL

## 2018-02-04 DIAGNOSIS — K59.00 CONSTIPATION, UNSPECIFIED CONSTIPATION TYPE: ICD-10-CM

## 2018-02-04 DIAGNOSIS — K65.1 PERITONEAL ABSCESS (H): ICD-10-CM

## 2018-02-04 LAB
ALBUMIN SERPL-MCNC: 2.4 G/DL (ref 3.4–5)
ALBUMIN UR-MCNC: 10 MG/DL
ALP SERPL-CCNC: 177 U/L (ref 40–150)
ALT SERPL W P-5'-P-CCNC: 30 U/L (ref 0–70)
AMPHETAMINES UR QL SCN: NEGATIVE
ANION GAP SERPL CALCULATED.3IONS-SCNC: 11 MMOL/L (ref 3–14)
APPEARANCE UR: CLEAR
AST SERPL W P-5'-P-CCNC: 25 U/L (ref 0–45)
BARBITURATES UR QL: NEGATIVE
BASOPHILS # BLD AUTO: 0 10E9/L (ref 0–0.2)
BASOPHILS NFR BLD AUTO: 0.1 %
BENZODIAZ UR QL: NEGATIVE
BILIRUB SERPL-MCNC: 0.7 MG/DL (ref 0.2–1.3)
BILIRUB UR QL STRIP: NEGATIVE
BUN SERPL-MCNC: 13 MG/DL (ref 7–30)
CALCIUM SERPL-MCNC: 8.2 MG/DL (ref 8.5–10.1)
CANNABINOIDS UR QL SCN: NEGATIVE
CHLORIDE SERPL-SCNC: 99 MMOL/L (ref 94–109)
CO2 SERPL-SCNC: 24 MMOL/L (ref 20–32)
COCAINE UR QL: NEGATIVE
COLOR UR AUTO: YELLOW
CREAT SERPL-MCNC: 0.67 MG/DL (ref 0.66–1.25)
DIFFERENTIAL METHOD BLD: ABNORMAL
EOSINOPHIL # BLD AUTO: 0.1 10E9/L (ref 0–0.7)
EOSINOPHIL NFR BLD AUTO: 1.4 %
ERYTHROCYTE [DISTWIDTH] IN BLOOD BY AUTOMATED COUNT: 13 % (ref 10–15)
ETHANOL UR QL SCN: NEGATIVE
GFR SERPL CREATININE-BSD FRML MDRD: >90 ML/MIN/1.7M2
GLUCOSE BLDC GLUCOMTR-MCNC: 122 MG/DL (ref 70–99)
GLUCOSE SERPL-MCNC: 174 MG/DL (ref 70–99)
GLUCOSE UR STRIP-MCNC: NEGATIVE MG/DL
HCT VFR BLD AUTO: 34 % (ref 40–53)
HGB BLD-MCNC: 11.5 G/DL (ref 13.3–17.7)
HGB UR QL STRIP: NEGATIVE
IMM GRANULOCYTES # BLD: 0.1 10E9/L (ref 0–0.4)
IMM GRANULOCYTES NFR BLD: 0.5 %
KETONES UR STRIP-MCNC: 10 MG/DL
LEUKOCYTE ESTERASE UR QL STRIP: NEGATIVE
LIPASE SERPL-CCNC: 175 U/L (ref 73–393)
LYMPHOCYTES # BLD AUTO: 1.2 10E9/L (ref 0.8–5.3)
LYMPHOCYTES NFR BLD AUTO: 11.9 %
MCH RBC QN AUTO: 31.9 PG (ref 26.5–33)
MCHC RBC AUTO-ENTMCNC: 33.8 G/DL (ref 31.5–36.5)
MCV RBC AUTO: 94 FL (ref 78–100)
MONOCYTES # BLD AUTO: 0.8 10E9/L (ref 0–1.3)
MONOCYTES NFR BLD AUTO: 8.1 %
NEUTROPHILS # BLD AUTO: 7.5 10E9/L (ref 1.6–8.3)
NEUTROPHILS NFR BLD AUTO: 78 %
NITRATE UR QL: NEGATIVE
NRBC # BLD AUTO: 0 10*3/UL
NRBC BLD AUTO-RTO: 0 /100
OPIATES UR QL SCN: NEGATIVE
PH UR STRIP: 7 PH (ref 5–7)
PLATELET # BLD AUTO: 246 10E9/L (ref 150–450)
POTASSIUM SERPL-SCNC: 3 MMOL/L (ref 3.4–5.3)
PROT SERPL-MCNC: 6.8 G/DL (ref 6.8–8.8)
RADIOLOGIST FLAGS: ABNORMAL
RBC # BLD AUTO: 3.61 10E12/L (ref 4.4–5.9)
RBC #/AREA URNS AUTO: 1 /HPF (ref 0–2)
SODIUM SERPL-SCNC: 134 MMOL/L (ref 133–144)
SOURCE: ABNORMAL
SP GR UR STRIP: 1.01 (ref 1–1.03)
UROBILINOGEN UR STRIP-MCNC: NORMAL MG/DL (ref 0–2)
WBC # BLD AUTO: 9.7 10E9/L (ref 4–11)
WBC #/AREA URNS AUTO: 2 /HPF (ref 0–2)

## 2018-02-04 PROCEDURE — 25000132 ZZH RX MED GY IP 250 OP 250 PS 637: Performed by: STUDENT IN AN ORGANIZED HEALTH CARE EDUCATION/TRAINING PROGRAM

## 2018-02-04 PROCEDURE — 36415 COLL VENOUS BLD VENIPUNCTURE: CPT | Performed by: EMERGENCY MEDICINE

## 2018-02-04 PROCEDURE — 85025 COMPLETE CBC W/AUTO DIFF WBC: CPT | Performed by: EMERGENCY MEDICINE

## 2018-02-04 PROCEDURE — 25000128 H RX IP 250 OP 636: Performed by: STUDENT IN AN ORGANIZED HEALTH CARE EDUCATION/TRAINING PROGRAM

## 2018-02-04 PROCEDURE — 96374 THER/PROPH/DIAG INJ IV PUSH: CPT | Performed by: EMERGENCY MEDICINE

## 2018-02-04 PROCEDURE — 25000128 H RX IP 250 OP 636: Performed by: EMERGENCY MEDICINE

## 2018-02-04 PROCEDURE — 25000125 ZZHC RX 250: Performed by: STUDENT IN AN ORGANIZED HEALTH CARE EDUCATION/TRAINING PROGRAM

## 2018-02-04 PROCEDURE — 12000008 ZZH R&B INTERMEDIATE UMMC

## 2018-02-04 PROCEDURE — 40000141 ZZH STATISTIC PERIPHERAL IV START W/O US GUIDANCE

## 2018-02-04 PROCEDURE — 99285 EMERGENCY DEPT VISIT HI MDM: CPT | Mod: 25 | Performed by: EMERGENCY MEDICINE

## 2018-02-04 PROCEDURE — 25000132 ZZH RX MED GY IP 250 OP 250 PS 637: Performed by: EMERGENCY MEDICINE

## 2018-02-04 PROCEDURE — 83690 ASSAY OF LIPASE: CPT | Performed by: EMERGENCY MEDICINE

## 2018-02-04 PROCEDURE — 87086 URINE CULTURE/COLONY COUNT: CPT | Performed by: EMERGENCY MEDICINE

## 2018-02-04 PROCEDURE — 81001 URINALYSIS AUTO W/SCOPE: CPT | Performed by: EMERGENCY MEDICINE

## 2018-02-04 PROCEDURE — 96361 HYDRATE IV INFUSION ADD-ON: CPT | Performed by: EMERGENCY MEDICINE

## 2018-02-04 PROCEDURE — 80320 DRUG SCREEN QUANTALCOHOLS: CPT | Performed by: EMERGENCY MEDICINE

## 2018-02-04 PROCEDURE — 00000146 ZZHCL STATISTIC GLUCOSE BY METER IP

## 2018-02-04 PROCEDURE — 84132 ASSAY OF SERUM POTASSIUM: CPT | Performed by: EMERGENCY MEDICINE

## 2018-02-04 PROCEDURE — 80307 DRUG TEST PRSMV CHEM ANLYZR: CPT | Performed by: EMERGENCY MEDICINE

## 2018-02-04 PROCEDURE — 25000128 H RX IP 250 OP 636: Performed by: INTERNAL MEDICINE

## 2018-02-04 PROCEDURE — 74177 CT ABD & PELVIS W/CONTRAST: CPT

## 2018-02-04 PROCEDURE — 99285 EMERGENCY DEPT VISIT HI MDM: CPT | Mod: Z6 | Performed by: EMERGENCY MEDICINE

## 2018-02-04 PROCEDURE — 80053 COMPREHEN METABOLIC PANEL: CPT | Performed by: EMERGENCY MEDICINE

## 2018-02-04 PROCEDURE — 96375 TX/PRO/DX INJ NEW DRUG ADDON: CPT | Performed by: EMERGENCY MEDICINE

## 2018-02-04 RX ORDER — OXYCODONE HYDROCHLORIDE 5 MG/1
5-10 TABLET ORAL
Status: DISCONTINUED | OUTPATIENT
Start: 2018-02-04 | End: 2018-02-06

## 2018-02-04 RX ORDER — NALOXONE HYDROCHLORIDE 0.4 MG/ML
.1-.4 INJECTION, SOLUTION INTRAMUSCULAR; INTRAVENOUS; SUBCUTANEOUS
Status: DISCONTINUED | OUTPATIENT
Start: 2018-02-04 | End: 2018-02-08 | Stop reason: HOSPADM

## 2018-02-04 RX ORDER — POLYETHYLENE GLYCOL 3350 17 G/17G
17 POWDER, FOR SOLUTION ORAL DAILY PRN
Status: DISCONTINUED | OUTPATIENT
Start: 2018-02-04 | End: 2018-02-05

## 2018-02-04 RX ORDER — ONDANSETRON 4 MG/1
4 TABLET, ORALLY DISINTEGRATING ORAL EVERY 6 HOURS PRN
Status: DISCONTINUED | OUTPATIENT
Start: 2018-02-04 | End: 2018-02-08 | Stop reason: HOSPADM

## 2018-02-04 RX ORDER — AMOXICILLIN 250 MG
2 CAPSULE ORAL 2 TIMES DAILY PRN
Status: DISCONTINUED | OUTPATIENT
Start: 2018-02-04 | End: 2018-02-08 | Stop reason: HOSPADM

## 2018-02-04 RX ORDER — AMOXICILLIN 250 MG
1 CAPSULE ORAL 2 TIMES DAILY PRN
Status: DISCONTINUED | OUTPATIENT
Start: 2018-02-04 | End: 2018-02-08 | Stop reason: HOSPADM

## 2018-02-04 RX ORDER — POTASSIUM CHLORIDE 1.5 G/1.58G
40 POWDER, FOR SOLUTION ORAL ONCE
Status: DISCONTINUED | OUTPATIENT
Start: 2018-02-04 | End: 2018-02-04

## 2018-02-04 RX ORDER — POTASSIUM CHLORIDE 1.5 G/1.58G
40 POWDER, FOR SOLUTION ORAL ONCE
Status: COMPLETED | OUTPATIENT
Start: 2018-02-04 | End: 2018-02-04

## 2018-02-04 RX ORDER — SODIUM CHLORIDE, SODIUM LACTATE, POTASSIUM CHLORIDE, CALCIUM CHLORIDE 600; 310; 30; 20 MG/100ML; MG/100ML; MG/100ML; MG/100ML
INJECTION, SOLUTION INTRAVENOUS CONTINUOUS
Status: DISCONTINUED | OUTPATIENT
Start: 2018-02-04 | End: 2018-02-06

## 2018-02-04 RX ORDER — HYDROMORPHONE HYDROCHLORIDE 1 MG/ML
0.5 INJECTION, SOLUTION INTRAMUSCULAR; INTRAVENOUS; SUBCUTANEOUS ONCE
Status: COMPLETED | OUTPATIENT
Start: 2018-02-04 | End: 2018-02-04

## 2018-02-04 RX ORDER — PROCHLORPERAZINE 25 MG
25 SUPPOSITORY, RECTAL RECTAL EVERY 12 HOURS PRN
Status: DISCONTINUED | OUTPATIENT
Start: 2018-02-04 | End: 2018-02-08 | Stop reason: HOSPADM

## 2018-02-04 RX ORDER — BISACODYL 10 MG
10 SUPPOSITORY, RECTAL RECTAL DAILY PRN
Status: DISCONTINUED | OUTPATIENT
Start: 2018-02-04 | End: 2018-02-06

## 2018-02-04 RX ORDER — SODIUM CHLORIDE 9 MG/ML
1000 INJECTION, SOLUTION INTRAVENOUS CONTINUOUS
Status: DISCONTINUED | OUTPATIENT
Start: 2018-02-04 | End: 2018-02-04

## 2018-02-04 RX ORDER — PROCHLORPERAZINE MALEATE 5 MG
10 TABLET ORAL EVERY 6 HOURS PRN
Status: DISCONTINUED | OUTPATIENT
Start: 2018-02-04 | End: 2018-02-08 | Stop reason: HOSPADM

## 2018-02-04 RX ORDER — GABAPENTIN 300 MG/1
300 CAPSULE ORAL 3 TIMES DAILY
Status: DISCONTINUED | OUTPATIENT
Start: 2018-02-04 | End: 2018-02-08 | Stop reason: HOSPADM

## 2018-02-04 RX ORDER — ONDANSETRON 2 MG/ML
4 INJECTION INTRAMUSCULAR; INTRAVENOUS EVERY 6 HOURS PRN
Status: DISCONTINUED | OUTPATIENT
Start: 2018-02-04 | End: 2018-02-08 | Stop reason: HOSPADM

## 2018-02-04 RX ORDER — HYDROMORPHONE HCL/0.9% NACL/PF 0.2MG/0.2
0.2 SYRINGE (ML) INTRAVENOUS
Status: DISCONTINUED | OUTPATIENT
Start: 2018-02-04 | End: 2018-02-06

## 2018-02-04 RX ORDER — IOPAMIDOL 755 MG/ML
104 INJECTION, SOLUTION INTRAVASCULAR ONCE
Status: COMPLETED | OUTPATIENT
Start: 2018-02-04 | End: 2018-02-04

## 2018-02-04 RX ADMIN — HYDROMORPHONE HYDROCHLORIDE 0.5 MG: 10 INJECTION, SOLUTION INTRAMUSCULAR; INTRAVENOUS; SUBCUTANEOUS at 20:07

## 2018-02-04 RX ADMIN — IOPAMIDOL 104 ML: 755 INJECTION, SOLUTION INTRAVENOUS at 16:34

## 2018-02-04 RX ADMIN — BISACODYL 10 MG: 10 SUPPOSITORY RECTAL at 21:16

## 2018-02-04 RX ADMIN — SODIUM CHLORIDE 1000 ML: 900 INJECTION, SOLUTION INTRAVENOUS at 17:23

## 2018-02-04 RX ADMIN — PIPERACILLIN SODIUM AND TAZOBACTAM SODIUM 3.38 G: .375; 3 INJECTION, POWDER, LYOPHILIZED, FOR SOLUTION INTRAVENOUS at 19:03

## 2018-02-04 RX ADMIN — Medication 0.2 MG: at 22:01

## 2018-02-04 RX ADMIN — SODIUM CHLORIDE 1000 ML: 900 INJECTION, SOLUTION INTRAVENOUS at 20:06

## 2018-02-04 RX ADMIN — SODIUM CHLORIDE, POTASSIUM CHLORIDE, SODIUM LACTATE AND CALCIUM CHLORIDE: 600; 310; 30; 20 INJECTION, SOLUTION INTRAVENOUS at 22:02

## 2018-02-04 RX ADMIN — SODIUM CHLORIDE 1000 ML: 9 INJECTION, SOLUTION INTRAVENOUS at 15:31

## 2018-02-04 RX ADMIN — SODIUM CHLORIDE 1000 ML: 900 INJECTION, SOLUTION INTRAVENOUS at 19:04

## 2018-02-04 RX ADMIN — GABAPENTIN 300 MG: 300 CAPSULE ORAL at 21:15

## 2018-02-04 RX ADMIN — POTASSIUM CHLORIDE 40 MEQ: 1.5 POWDER, FOR SOLUTION ORAL at 19:04

## 2018-02-04 RX ADMIN — POTASSIUM CHLORIDE 40 MEQ: 1.5 POWDER, FOR SOLUTION ORAL at 20:31

## 2018-02-04 RX ADMIN — SODIUM CHLORIDE, PRESERVATIVE FREE 76 ML: 5 INJECTION INTRAVENOUS at 16:34

## 2018-02-04 ASSESSMENT — ENCOUNTER SYMPTOMS
CONSTIPATION: 1
SHORTNESS OF BREATH: 0
ABDOMINAL DISTENTION: 1
COLOR CHANGE: 0
ARTHRALGIAS: 0
FEVER: 0
ABDOMINAL PAIN: 1
CONFUSION: 0
HEADACHES: 0
EYE REDNESS: 0
DIFFICULTY URINATING: 0
NECK STIFFNESS: 0
APPETITE CHANGE: 1

## 2018-02-04 ASSESSMENT — PAIN DESCRIPTION - DESCRIPTORS
DESCRIPTORS: NAGGING;ACHING;SHARP
DESCRIPTORS: ACHING;SHARP

## 2018-02-04 NOTE — ED NOTES
Andrew presents by ambulance with c/o constipation. He states he had an appendectomy two days ago and was seen here last night for similar. Ambulatory to triage.

## 2018-02-04 NOTE — IP AVS SNAPSHOT
MRN:[id number]                      After Visit Summary   2/4/2018    Andrew Lockwood    MRN: [id number]           Thank you!     Thank you for choosing [facility] for your care. Our goal is always to provide you with excellent care. Hearing back from our patients is one way we can continue to improve our services. Please take a few minutes to complete the written survey that you may receive in the mail after you visit with us. Thank you!        Patient Information     Date Of Birth          11/27/1967        Designated Caregiver       Most Recent Value    Caregiver    Will someone help with your care after discharge? yes    Name of designated caregiver Prince Kwong    Phone number of caregiver Does not know number    Caregiver address [address] [place] [address],[facility],       About your hospital stay     You were admitted on:  February 4, 2018 You last received care in the:  [address] [facility] [facility]    You were discharged on:  February 7, 2018        Reason for your hospital stay       Abdominal abscess, now with drain placement                  Who to Call     For medical emergencies, please call 911.  For non-urgent questions about your medical care, please call your primary care provider or clinic, [phone]          Attending Provider     Provider Specialty    Tahmina Barkley MD Emergency Medicine    Lam Hines MD Surgery       Primary Care Provider Office Phone # Fax #    Sharon Carrie Rosado [phone]      After Care Instructions     Discharge Instructions       DIET: regular diet  You may shower and get incision wet 2 days after operation. Do not submerge, soak, or scrub incision or swim until seen in follow-up.   The steri-strips over the wound will fall off on their own in a couple of days.  The stitches do not need to be removed, they will be absorbed on their own.  No lifting, pushing, or pulling greater than 10-15 pounds x 6 weeks.  Stay hydrated. Narcotics  "can make you constipated. Take over the counter fiber (metamucil or benefiber) and stool softeners (Miralax, docusate or senna) if becoming constipated.   Call for fever greater than 101.5, chills, decreased urine output, increased size of incision, red skin around incision, bleeding, shortness of breath, or other concerns.   Transition to ibuprofen or tylenol/acetaminophen for pain control. Do not take tylenol/acetaminophen and acetaminophen containing narcotic (e.g., percocet or vicodin) at the same time.  No driving while taking narcotic pain medications.  In emergencies, call 911   For other Questions or Concerns;   A.) During weekday working hours (Monday through Friday 8am to 4:30pm)   Call [phone] and ask to speak to the Surgery Nurse.   B.) At nights (after 4:30pm), on weekends, or if urgent call [phone]  and tell the  \"I would like to page the Surgery Resident on call.\"            Tubes and drains       You are going home with the following tubes or drains: VITO. Tube cares per hospital instructions. Flush drain with 10mL three times per day.                  Follow-up Appointments     Adult [facility] Follow-up and recommended labs and tests       Will follow up as instructed by nurse clinician. You will be called after discharged with updates.     Please also follow-up with Interventional Radiology for drain cares.     Appointments on [place] and/or [facility] (with [facility] or [facility] provider or service). Call [phone] if you haven't heard regarding these appointments within 7 days of discharge.            Follow Up and recommended labs and tests       Follow up with primary care provider, Sharon Rosado, within 7 days for hospital follow- up.  No follow up labs or test are needed.                  Your next 10 appointments already scheduled     Feb 20, 2018  3:20 PM CST   (Arrive by 3:05 PM)   Return Visit with Aguila Ingram MD   [facility] ([facility] " "[facility])    [address] [place] [address]  [address] [facility] [address]   [phone]            Mar 09, 2018 10:30 AM CST   (Arrive by 10:15 AM)   RETURN HAND with Evin Zamorano MD   [facility] ([facility])    [address] [place] [address]  [address] [facility] [address]   [phone]            Apr 06, 2018  8:30 AM CDT   (Arrive by 8:15 AM)   Return Visit with Jose Ramon Singh MD   [facility] ([facility] [place])    [address] [place] [address]  [address] [facility] [address]   [phone]              Pending Results     Date and Time Order Name Status Description    2/5/2018 1200 Fungus Culture, non-blood Preliminary     2/5/2018 1021 Anaerobic bacterial culture Preliminary     2/5/2018 1021 Abscess Culture Aerobic Bacterial Preliminary             Statement of Approval     Ordered          02/07/18 1550  I have reviewed and agree with all the recommendations and orders detailed in this document.  EFFECTIVE NOW     Approved and electronically signed by:  Kevin Santana MD           02/06/18 6768  I have reviewed and agree with all the recommendations and orders detailed in this document.  EFFECTIVE NOW     Approved and electronically signed by:  Kevin Santana MD             Admission Information     Date & Time Provider Department Dept. Phone    2/4/2018 Lam Hines MD [address] [facility] [phone]      Your Vitals Were     Blood Pressure Pulse Temperature Respirations Height Weight    135/57 (BP Location: Right arm) 107 97.4  F (36.3  C) (Oral) 18 1.651 m (5' 5\") 75.5 kg (166 lb 6.4 oz)    Pulse Oximetry BMI (Body Mass Index)                96% 27.69 kg/m2          MyChart Information     [facility] gives you secure access to your electronic health record. If you see a primary care provider, you can also send messages to your care team and make appointments. If you have questions, please call your primary " care clinic.  If you do not have a primary care provider, please call [phone] and they will assist you.        Care EveryWhere ID     This is your Care EveryWhere ID. This could be used by other organizations to access your [facility] medical records  [id number]        Equal Access to Services     RA JORDAN : Hadii aad ku hadsebastianjuan luis Soterence, waaxda luqadaha, qaybta kaalmada adeechoyada, dez malcolmclaudetteriky salazar. So [facility] [phone].    ATENCIÓN: Si habla español, tiene a kohli disposición servicios gratuitos de asistencia lingüística. Llame al [phone].    We comply with applicable federal civil rights laws and [place] laws. We do not discriminate on the basis of race, color, national origin, age, disability, sex, sexual orientation, or gender identity.               Review of your medicines      START taking        Dose / Directions    acetaminophen 325 MG tablet   Commonly known as:  TYLENOL        Dose:  650 mg   Take 2 tablets (650 mg) by mouth every 6 hours   Quantity:  100 tablet   Refills:  0       ciprofloxacin 500 MG tablet   Commonly known as:  CIPRO   Indication:  Abscess, Infection Within the Abdomen   Used for:  Peritoneal abscess (H)        Dose:  500 mg   Take 1 tablet (500 mg) by mouth every 12 hours for 13 days   Quantity:  26 tablet   Refills:  0       metroNIDAZOLE 500 MG tablet   Commonly known as:  FLAGYL   Indication:  Abscess, Infection Within the Abdomen   Used for:  Peritoneal abscess (H)        Dose:  500 mg   Take 1 tablet (500 mg) by mouth 2 times daily for 13 days   Quantity:  26 tablet   Refills:  0       senna-docusate 8.6-50 MG per tablet   Commonly known as:  SENOKOT-S;PERICOLACE   Used for:  Constipation, unspecified constipation type        Dose:  1 tablet   Take 1 tablet by mouth 2 times daily as needed for constipation   Quantity:  50 tablet   Refills:  0       sodium chloride (PF) 0.9% PF flush   Used for:  Peritoneal abscess (H)        Flush abdominal  drain three times per day as instructed.   Quantity:  420 mL   Refills:  0         CONTINUE these medicines which may have CHANGED, or have new prescriptions. If we are uncertain of the size of tablets/capsules you have at home, strength may be listed as something that might have changed.        Dose / Directions    * polyethylene glycol Packet   Commonly known as:  MIRALAX/GLYCOLAX   This may have changed:  Another medication with the same name was added. Make sure you understand how and when to take each.   Used for:  S/P appendectomy        Dose:  17 g   Take 17 g by mouth daily as needed for constipation   Quantity:  10 packet   Refills:  0       * polyethylene glycol Packet   Commonly known as:  MIRALAX/GLYCOLAX   This may have changed:  You were already taking a medication with the same name, and this prescription was added. Make sure you understand how and when to take each.   Used for:  Constipation, unspecified constipation type        Dose:  17 g   Take 17 g by mouth 2 times daily   Quantity:  24 packet   Refills:  0       * Notice:  This list has 2 medication(s) that are the same as other medications prescribed for you. Read the directions carefully, and ask your doctor or other care provider to review them with you.      CONTINUE these medicines which have NOT CHANGED        Dose / Directions    alum & mag hydroxide-simethicone 400-400-40 MG/5ML Susp suspension   Commonly known as:  MAALOX ADVANCED MAX ST        Dose:  30 mL   Take 30 mLs by mouth every 4 hours as needed for indigestion   Quantity:  355 mL   Refills:  0       blood glucose lancets standard   Commonly known as:  no brand specified   Used for:  Type 2 diabetes mellitus without complication, without long-term current use of insulin (H)        Use to test blood sugar four times daily or as directed.   Quantity:  100 Box   Refills:  5       * blood glucose monitoring meter device kit   Commonly known as:  no brand specified   Used for:  Type  2 diabetes mellitus without complication, without long-term current use of insulin (H)        Use to test blood sugar four times daily or as directed.   Quantity:  1 kit   Refills:  0       * blood glucose monitoring meter device kit   Commonly known as:  no brand specified   Used for:  Type 2 diabetes mellitus with complication, without long-term current use of insulin (H)        Use to test blood sugar 4 times daily or as directed.   Quantity:  1 kit   Refills:  0       * blood glucose monitoring test strip   Commonly known as:  no brand specified   Used for:  Type 2 diabetes mellitus without complication, without long-term current use of insulin (H)        Use to test blood sugars four times daily or as directed   Quantity:  100 each   Refills:  5       * blood glucose monitoring test strip   Commonly known as:  no brand specified   Used for:  Type 2 diabetes mellitus with complication, without long-term current use of insulin (H)        Dose:  1 strip   1 strip by In Vitro route 4 times daily (before meals and nightly) Use to test blood sugars 4 times daily or as directed   Quantity:  100 strip   Refills:  11       Pfhkfkx-Vvewl-Dacernvd-TenofAF 551-311-284-10 MG Tabs per tablet   Commonly known as:  GENVOYA   Used for:  Human immunodeficiency virus (HIV) disease        Dose:  1 tablet   Take 1 tablet by mouth daily   Quantity:  30 tablet   Refills:  5       gabapentin 300 MG capsule   Commonly known as:  NEURONTIN   Used for:  Herpes zoster without complication        Dose:  300 mg   Take 1 capsule (300 mg) by mouth 3 times daily   Quantity:  90 capsule   Refills:  5       glipiZIDE 5 MG tablet   Commonly known as:  GLUCOTROL   Used for:  Type 2 diabetes mellitus with complication, without long-term current use of insulin (H)        Dose:  2.5-5 mg   Take 0.5-1 tablets (2.5-5 mg) by mouth daily   Quantity:  30 tablet   Refills:  11       ibuprofen 200 MG tablet   Commonly known as:  ADVIL/MOTRIN   Used for:   S/P appendectomy        Dose:  200-400 mg   Take 1-2 tablets (200-400 mg) by mouth every 6 hours as needed for moderate pain   Quantity:  40 tablet   Refills:  0       oxyCODONE IR 5 MG tablet   Commonly known as:  ROXICODONE   Used for:  Acute appendicitis, unspecified acute appendicitis type        Dose:  5 mg   Take 1 tablet (5 mg) by mouth every 4 hours as needed for pain maximum 6 tablet(s) per day   Quantity:  15 tablet   Refills:  0       * Notice:  This list has 4 medication(s) that are the same as other medications prescribed for you. Read the directions carefully, and ask your doctor or other care provider to review them with you.         Where to get your medicines      These medications were sent to [facility] - [place], [place] - [address]  [address] [address]     Phone:  [phone]     acetaminophen 325 MG tablet    ciprofloxacin 500 MG tablet    metroNIDAZOLE 500 MG tablet    polyethylene glycol Packet    senna-docusate 8.6-50 MG per tablet    sodium chloride (PF) 0.9% PF flush                Protect others around you: Learn how to safely use, store and throw away your medicines at www.disposemymeds.org.        ANTIBIOTIC INSTRUCTION     You've Been Prescribed an Antibiotic - Now What?  Your healthcare team thinks that you or your loved one might have an infection. Some infections can be treated with antibiotics, which are powerful, life-saving drugs. Like all medications, antibiotics have side effects and should only be used when necessary. There are some important things you should know about your antibiotic treatment.      Your healthcare team may run tests before you start taking an antibiotic.    Your team may take samples (e.g., from your blood, urine or other areas) to run tests to look for bacteria. These test can be important to determine if you need an antibiotic at all and, if you do, which antibiotic will work best.      Within a few days,  your healthcare team might change or even stop your antibiotic.    Your team may start you on an antibiotic while they are working to find out what is making you sick.    Your team might change your antibiotic because test results show that a different antibiotic would be better to treat your infection.    In some cases, once your team has more information, they learn that you do not need an antibiotic at all. They may find out that you don't have an infection, or that the antibiotic you're taking won't work against your infection. For example, an infection caused by a virus can't be treated with antibiotics. Staying on an antibiotic when you don't need it is more likely to be harmful than helpful.      You may experience side effects from your antibiotic.    Like all medications, antibiotics have side effects. Some of these can be serious.    Let you healthcare team know if you have any known allergies when you are admitted to the hospital.    One significant side effect of nearly all antibiotics is the risk of severe and sometimes deadly diarrhea caused by Clostridium difficile (C. Difficile). This occurs when a person takes antibiotics because some good germs are destroyed. Antibiotic use allows C. diificile to take over, putting patients at high risk for this serious infection.    As a patient or caregiver, it is important to understand your or your loved one's antibiotic treatment. It is especially important for caregivers to speak up when patients can't speak for themselves. Here are some important questions to ask your healthcare team.    What infection is this antibiotic treating and how do you know I have that infection?    What side effects might occur from this antibiotic?    How long will I need to take this antibiotic?    Is it safe to take this antibiotic with other medications or supplements (e.g., vitamins) that I am taking?     Are there any special directions I need to know about taking this  antibiotic? For example, should I take it with food?    How will I be monitored to know whether my infection is responding to the antibiotic?    What tests may help to make sure the right antibiotic is prescribed for me?      Information provided by:  www.cdc.gov/getsmart  U.S. Department of Health and Human Services  Centers for disease Control and Prevention  National Center for Emerging and Zoonotic Infectious Diseases  Division of Healthcare Quality Promotion             Medication List: This is a list of all your medications and when to take them. Check marks below indicate your daily home schedule. Keep this list as a reference.      Medications           Morning Afternoon Evening Bedtime As Needed    acetaminophen 325 MG tablet   Commonly known as:  TYLENOL   Take 2 tablets (650 mg) by mouth every 6 hours   Last time this was given:  650 mg on 2/7/2018  1:50 PM                                alum & mag hydroxide-simethicone 400-400-40 MG/5ML Susp suspension   Commonly known as:  MAALOX ADVANCED MAX ST   Take 30 mLs by mouth every 4 hours as needed for indigestion                                blood glucose lancets standard   Commonly known as:  no brand specified   Use to test blood sugar four times daily or as directed.                                * blood glucose monitoring meter device kit   Commonly known as:  no brand specified   Use to test blood sugar four times daily or as directed.                                * blood glucose monitoring meter device kit   Commonly known as:  no brand specified   Use to test blood sugar 4 times daily or as directed.                                * blood glucose monitoring test strip   Commonly known as:  no brand specified   Use to test blood sugars four times daily or as directed                                * blood glucose monitoring test strip   Commonly known as:  no brand specified   1 strip by In Vitro route 4 times daily (before meals and nightly) Use  to test blood sugars 4 times daily or as directed                                ciprofloxacin 500 MG tablet   Commonly known as:  CIPRO   Take 1 tablet (500 mg) by mouth every 12 hours for 13 days   Last time this was given:  500 mg on 2/7/2018  9:17 AM                                Wztoolt-Xdvju-Nsxtcyvf-TenofAF 165-382-496-10 MG Tabs per tablet   Commonly known as:  GENVOYA   Take 1 tablet by mouth daily   Last time this was given:  1 tablet on 2/7/2018  1:00 PM                                gabapentin 300 MG capsule   Commonly known as:  NEURONTIN   Take 1 capsule (300 mg) by mouth 3 times daily   Last time this was given:  300 mg on 2/7/2018  9:16 AM                                glipiZIDE 5 MG tablet   Commonly known as:  GLUCOTROL   Take 0.5-1 tablets (2.5-5 mg) by mouth daily   Last time this was given:  2.5 mg on 2/7/2018  9:16 AM                                ibuprofen 200 MG tablet   Commonly known as:  ADVIL/MOTRIN   Take 1-2 tablets (200-400 mg) by mouth every 6 hours as needed for moderate pain                                metroNIDAZOLE 500 MG tablet   Commonly known as:  FLAGYL   Take 1 tablet (500 mg) by mouth 2 times daily for 13 days   Last time this was given:  500 mg on 2/7/2018  9:16 AM                                oxyCODONE IR 5 MG tablet   Commonly known as:  ROXICODONE   Take 1 tablet (5 mg) by mouth every 4 hours as needed for pain maximum 6 tablet(s) per day   Last time this was given:  10 mg on 2/7/2018  1:50 PM                                * polyethylene glycol Packet   Commonly known as:  MIRALAX/GLYCOLAX   Take 17 g by mouth daily as needed for constipation   Last time this was given:  17 g on 2/7/2018  9:16 AM                                * polyethylene glycol Packet   Commonly known as:  MIRALAX/GLYCOLAX   Take 17 g by mouth 2 times daily   Last time this was given:  17 g on 2/7/2018  9:16 AM                                senna-docusate 8.6-50 MG per tablet   Commonly  known as:  SENOKOT-S;PERICOLACE   Take 1 tablet by mouth 2 times daily as needed for constipation   Last time this was given:  1 tablet on 2/7/2018  9:16 AM                                sodium chloride (PF) 0.9% PF flush   Flush abdominal drain three times per day as instructed.   Last time this was given:  10 mLs on 2/7/2018  6:38 AM                                * Notice:  This list has 6 medication(s) that are the same as other medications prescribed for you. Read the directions carefully, and ask your doctor or other care provider to review them with you.

## 2018-02-04 NOTE — ED PROVIDER NOTES
History     Chief Complaint   Patient presents with     Abdominal Pain     HPI  Andrew Lockwood is a 50 year old male with a history notable for DM2, HIV/AIDS, CNS toxoplasmosis, and chronic abdominal pain who presents to the ED for the evaluation of abdominal pain. The patient is now 2 days status post appendectomy, and he states that he did not fill his pain medications secondary to a lack of financial means. He states that he is now experiencing severe lower quadrant abdominal pain, and he is seeking some means of pain control. The patient reports that he was given prescriptions for ibuprofen, Tylenol, and oxycodone at discharge, but he is not able to furnish the actual prescription for those medications at this time. He also states that he did not call into his surgery team's office as he does not have their phone number. No fevers, though he states he has had some diarrhea and poor PO intake. The patient offers no other concerns or complaints at this time.       PAST MEDICAL HISTORY:   Past Medical History:   Diagnosis Date     AIDS (H)      Allergic rhinitis due to other allergen     DNS     Chronic abdominal pain      CNS toxoplasmosis (H)      Diabetes type 2, controlled (H)      GERD (gastroesophageal reflux disease)      HIV (human immunodeficiency virus infection) (H)      Periungual wart      Sleep apnea     doesn't use CPAP       PAST SURGICAL HISTORY:   Past Surgical History:   Procedure Laterality Date     C NONSPECIFIC PROCEDURE      right forearm fracture     COLONOSCOPY Left 1/22/2016    Procedure: COMBINED COLONOSCOPY, SINGLE OR MULTIPLE BIOPSY/POLYPECTOMY BY BIOPSY;  Surgeon: Clark Saini MD;  Location: [facility]     HC EXPLORE UNDESC TESTIS,INGUIN/SCROTAL       LAPAROSCOPIC APPENDECTOMY N/A 1/31/2018    Procedure: LAPAROSCOPIC APPENDECTOMY;  LAPAROSCOPIC APPENDECTOMY;  Surgeon: Dawn Holt MD;  Location: [facility]     LAPAROSCOPY DIAGNOSTIC (GENERAL) N/A 7/26/2016     Procedure: LAPAROSCOPY DIAGNOSTIC (GENERAL);  Surgeon: Susannah Arriaga MD;  Location: [facility]     OPTICAL TRACKING SYSTEM CRANIOTOMY, EXCISE TUMOR, COMBINED Left 4/10/2015    Procedure: COMBINED OPTICAL TRACKING SYSTEM CRANIOTOMY, EXCISE TUMOR;  Surgeon: Mirlande Colmenares MD;  Location: [facility]     REPAIR GAMEKEEPER'S THUMB Right 12/2/2016    Procedure: REPAIR LIGAMENT ULNAR COLLATERAL THUMB (GAMEKEEPER'S);  Surgeon: Evin Zamorano MD;  Location: [facility]       FAMILY HISTORY:   Family History   Problem Relation Age of Onset     DIABETES Brother      DIABETES Father      Alzheimer Disease Father      Unknown/Adopted Mother      DIABETES Paternal Grandfather      CANCER No family hx of      no skin cancer     Skin Cancer No family hx of      no famiy hx of skin cancer       SOCIAL HISTORY:   Social History   Substance Use Topics     Smoking status: Current Every Day Smoker     Packs/day: 0.20     Last attempt to quit: 10/28/2016     Smokeless tobacco: Former User      Comment: 4 cigarettes     Alcohol use No      Comment: Last etoh in 2007       Patient's Medications   New Prescriptions    No medications on file   Previous Medications    ALUM & MAG HYDROXIDE-SIMETHICONE (MAALOX ADVANCED MAX ST) 400-400-40 MG/5ML SUSP SUSPENSION    Take 30 mLs by mouth every 4 hours as needed for indigestion    BLOOD GLUCOSE (NO BRAND SPECIFIED) LANCETS STANDARD    Use to test blood sugar four times daily or as directed.    BLOOD GLUCOSE MONITORING (NO BRAND SPECIFIED) METER DEVICE KIT    Use to test blood sugar four times daily or as directed.    BLOOD GLUCOSE MONITORING (NO BRAND SPECIFIED) METER DEVICE KIT    Use to test blood sugar 4 times daily or as directed.    BLOOD GLUCOSE MONITORING (NO BRAND SPECIFIED) TEST STRIP    Use to test blood sugars four times daily or as directed    BLOOD GLUCOSE MONITORING (NO BRAND SPECIFIED) TEST STRIP    1 strip by In Vitro route 4 times daily (before meals and nightly) Use to test  "blood sugars 4 times daily or as directed    GABAPENTIN (NEURONTIN) 300 MG CAPSULE    Take 1 capsule (300 mg) by mouth 3 times daily    GENVOYA [id number] MG TABLET    Take 1 tablet by mouth daily    GLIPIZIDE (GLUCOTROL) 5 MG TABLET    Take 0.5-1 tablets (2.5-5 mg) by mouth daily    IBUPROFEN (ADVIL/MOTRIN) 200 MG TABLET    Take 1-2 tablets (200-400 mg) by mouth every 6 hours as needed for moderate pain    OXYCODONE IR (ROXICODONE) 5 MG TABLET    Take 1 tablet (5 mg) by mouth every 4 hours as needed for pain maximum 6 tablet(s) per day    POLYETHYLENE GLYCOL (MIRALAX/GLYCOLAX) PACKET    Take 17 g by mouth daily as needed for constipation   Modified Medications    No medications on file   Discontinued Medications    No medications on file          Allergies   Allergen Reactions     Metformin      Abdominal pain     Milk [Lac Bovis] Hives     Tylenol [Acetaminophen] Itching           I have reviewed the Medications, Allergies, Past Medical and Surgical History, and Social History in the Epic system.    Review of Systems   Constitutional: Negative for chills and fever.   Gastrointestinal: Positive for abdominal pain and diarrhea. Negative for blood in stool, constipation and nausea.   Endocrine: Negative for polydipsia.   Skin: Negative for rash and wound.   Psychiatric/Behavioral: The patient is nervous/anxious.        Physical Exam   BP: 114/84  Pulse: 94  Temp: 98  F (36.7  C)  Resp: 18  Height: 165.1 cm (5' 5\")  SpO2: 100 %      Physical Exam   Constitutional: He appears well-developed. No distress.   HENT:   Mouth/Throat: Oropharynx is clear and moist.   Cardiovascular: Normal rate.    Pulmonary/Chest: Effort normal.   Abdominal: Soft. He exhibits no distension and no mass. There is tenderness. There is no rebound and no guarding.   Surgical scars are clean and dry.  No localized erythema    Neurological: He is alert.   Skin: No rash noted. He is not diaphoretic.   Psychiatric: He has a normal mood and " affect.       ED Course     ED Course     Procedures       Labs Ordered and Resulted from Time of ED Arrival Up to the Time of Departure from the ED - No data to display         Assessments & Plan (with Medical Decision Making)   50-year-old male with history of type 2 diabetes, HIV, chronic abdominal pain who is now 2 days status post laparoscopic appendectomy now arriving stating he did not fill his narcotics and reports generalized abdominal pain.  My evaluation the patient is lying supine in bed and appears nontoxic.  He is alert, afebrile, and hemodynamically stable.  The patient in fact reports that my palpation is hurting his abdomen even when not touching him.  However with distraction the patient does not seem to be in pain and has no involuntary guarding or distention.  Surgical sites are clean and dry without evidence of localized infection.  We did obtain laboratory studies that demonstrate no sign of significant leukocytosis electrolyte disturbance.  At this time based on examination I would hold on CT imaging.  As the patient was dismissed yesterday from the surgical service I did discuss this case with the general surgery resident who states that they do not need to see the patient in the emergency department and he can follow-up as an outpatient.    At this time laboratory studies demonstrate no significant leukocytosis or anemia.  I did discuss with the patient slightly low potassium with recommendations for oral intake and [id number] in 1 week's time.  The patient has demanded narcotics in the emergency department and requested another prescription.  On initial evaluation the patient told me he was given a prescription that he did not fill.  Prior to discharge the patient states he never received a prescription and now asked for 1.  I stated we will not be refilling narcotic prescription in the emergency department which the patient became agitated calling the RN and physician and asshole.  The  patient is hemodynamically stable and may safely follow up as an outpatient.       I have reviewed the nursing notes.    I have reviewed the findings, diagnosis, plan and need for follow up with the patient.    New Prescriptions    No medications on file       Final diagnoses:   Abdominal pain, generalized   Drug-seeking behavior   I, Judson Peguero, am serving as a trained medical scribe to document services personally performed by Kofi Esparza MD, based on the provider's statements to me.      Kofi KELLY MD, was physically present and have reviewed and verified the accuracy of this note documented by Judson Peguero.       2/3/2018   [facility], [place], EMERGENCY DEPARTMENT     Kofi Esparza MD  02/03/18 [id number]

## 2018-02-04 NOTE — ED NOTES
Pt BIBA for abdominal pain post op appendectomy. Pt never filled his pain medications post op because he could not afford it and is currently homeless staying at a friends house.

## 2018-02-04 NOTE — DISCHARGE INSTRUCTIONS

## 2018-02-04 NOTE — ED PROVIDER NOTES
History     Chief Complaint   Patient presents with     Constipation     HPI  Andrew Lockwood is a 50 year old male with a history of DM2, HIV/AIDS, CNS toxoplasmosis, and chronic abdominal pain who presents for evaluation of constipation. Patient is 4 days s/p appendectomy, and now complains of constipation. He notes he was constipated before the procedure, and since the procedure has still not been able to pass a bowel movement. He states his last bowel movement was 5 days ago, and he has had increased abdominal pain with associated distention and decreased appetite secondary to the constipation. He was evaluated in our ED last night with similar symptoms.     I have reviewed the Medications, Allergies, Past Medical and Surgical History, and Social History in the [employer] system.  Past Medical History:   Diagnosis Date     AIDS (H)      Allergic rhinitis due to other allergen     DNS     Chronic abdominal pain      CNS toxoplasmosis (H)      Diabetes type 2, controlled (H)      GERD (gastroesophageal reflux disease)      HIV (human immunodeficiency virus infection) (H)      Periungual wart      Sleep apnea     doesn't use CPAP       Past Surgical History:   Procedure Laterality Date     C NONSPECIFIC PROCEDURE      right forearm fracture     COLONOSCOPY Left 1/22/2016    Procedure: COMBINED COLONOSCOPY, SINGLE OR MULTIPLE BIOPSY/POLYPECTOMY BY BIOPSY;  Surgeon: Clark Saini MD;  Location: [facility]     HC EXPLORE UNDESC TESTIS,INGUIN/SCROTAL       LAPAROSCOPIC APPENDECTOMY N/A 1/31/2018    Procedure: LAPAROSCOPIC APPENDECTOMY;  LAPAROSCOPIC APPENDECTOMY;  Surgeon: Dawn Holt MD;  Location: [facility]     LAPAROSCOPY DIAGNOSTIC (GENERAL) N/A 7/26/2016    Procedure: LAPAROSCOPY DIAGNOSTIC (GENERAL);  Surgeon: Susannah Arriaga MD;  Location: [facility]     OPTICAL TRACKING SYSTEM CRANIOTOMY, EXCISE TUMOR, COMBINED Left 4/10/2015    Procedure: COMBINED OPTICAL TRACKING SYSTEM CRANIOTOMY, EXCISE TUMOR;   Surgeon: Mirlande Colmenares MD;  Location: [facility]     REPAIR GAMEKEEPER'S THUMB Right 12/2/2016    Procedure: REPAIR LIGAMENT ULNAR COLLATERAL THUMB (GAMEKEEPER'S);  Surgeon: Evin Zamorano MD;  Location:  OR       Family History   Problem Relation Age of Onset     DIABETES Brother      DIABETES Father      Alzheimer Disease Father      Unknown/Adopted Mother      DIABETES Paternal Grandfather      CANCER No family hx of      no skin cancer     Skin Cancer No family hx of      no famiy hx of skin cancer       Social History   Substance Use Topics     Smoking status: Current Every Day Smoker     Packs/day: 0.20     Last attempt to quit: 10/28/2016     Smokeless tobacco: Former User      Comment: 4 cigarettes     Alcohol use No      Comment: Last etoh in 2007       Current Facility-Administered Medications   Medication     0.9% sodium chloride BOLUS    Followed by     0.9% sodium chloride infusion     Current Outpatient Prescriptions   Medication     ibuprofen (ADVIL/MOTRIN) 200 MG tablet     polyethylene glycol (MIRALAX/GLYCOLAX) Packet     oxyCODONE IR (ROXICODONE) 5 MG tablet     alum & mag hydroxide-simethicone (MAALOX ADVANCED MAX ST) 400-400-40 MG/5ML SUSP suspension     blood glucose monitoring (NO BRAND SPECIFIED) meter device kit     blood glucose monitoring (NO BRAND SPECIFIED) test strip     glipiZIDE (GLUCOTROL) 5 MG tablet     gabapentin (NEURONTIN) 300 MG capsule     GENVOYA [id number] mg tablet     blood glucose monitoring (NO BRAND SPECIFIED) meter device kit     blood glucose monitoring (NO BRAND SPECIFIED) test strip     blood glucose (NO BRAND SPECIFIED) lancets standard        Allergies   Allergen Reactions     Metformin      Abdominal pain     Milk [Lac Bovis] Hives     Tylenol [Acetaminophen] Itching       Review of Systems   Constitutional: Positive for appetite change (decreased). Negative for fever.   HENT: Negative for congestion.    Eyes: Negative for redness.   Respiratory:  "Negative for shortness of breath.    Cardiovascular: Negative for chest pain.   Gastrointestinal: Positive for abdominal distention, abdominal pain and constipation.   Genitourinary: Negative for difficulty urinating.   Musculoskeletal: Negative for arthralgias and neck stiffness.   Skin: Negative for color change.   Neurological: Negative for headaches.   Psychiatric/Behavioral: Negative for confusion.   All other systems reviewed and are negative.      Physical Exam   BP: 123/74  Pulse: 89  Temp: 99.2  F (37.3  C)  Resp: 16  Height: 165.1 cm (5' 5\")  Weight: 76.8 kg (169 lb 5 oz)  SpO2: 98 %      Physical Exam   Constitutional: He is oriented to person, place, and time. No distress.   HENT:   Head: Atraumatic.   Mouth/Throat: Oropharynx is clear and moist. No oropharyngeal exudate.   Eyes: Pupils are equal, round, and reactive to light. No scleral icterus.   Cardiovascular: Normal rate, regular rhythm, normal heart sounds and intact distal pulses.    Pulmonary/Chest: Breath sounds normal. No respiratory distress.   Abdominal: Soft. Bowel sounds are normal. There is tenderness.   RLQ tender   Musculoskeletal: He exhibits no edema or tenderness.   Neurological: He is alert and oriented to person, place, and time.   Skin: Skin is warm. No rash noted. He is not diaphoretic.   Nursing note and vitals reviewed.      ED Course     ED Course     Procedures       3:58 PM  The patient was seen and examined by Ntaba in Room 15.          Critical Care time:  none             Labs Ordered and Resulted from Time of ED Arrival Up to the Time of Departure from the ED   CBC WITH PLATELETS DIFFERENTIAL - Abnormal; Notable for the following:        Result Value    RBC Count 3.61 (*)     Hemoglobin 11.5 (*)     Hematocrit 34.0 (*)     All other components within normal limits   COMPREHENSIVE METABOLIC PANEL   LIPASE   ROUTINE UA WITH MICROSCOPIC   PERIPHERAL IV CATHETER   URINE CULTURE AEROBIC BACTERIAL           Results for orders " placed or performed during the hospital encounter of 02/04/18   CT Abdomen Pelvis w Contrast   Result Value Ref Range    Radiologist flags Increased inflammatory changes, possible abscess (Urgent)     Narrative    Preliminary results. Full dictation to follow. Increased inflammatory  fatty stranding in the right lower quadrant at site of previous  appendicitis with a new fluid collection measuring up to 8.5 cm. This  has simple fluid attenuation however increased inflammation raises  concern for developing abscess.    [Urgent Result: Increased inflammatory changes, possible abscess]    Finding was identified on 2/4/2018 5:25 PM.     Dr. Barkley was contacted by Dr. Young at 2/4/2018 5:57 PM and verbalized  understanding of the urgent finding.    CBC with platelets differential   Result Value Ref Range    WBC 9.7 4.0 - 11.0 10e9/L    RBC Count 3.61 (L) 4.4 - 5.9 10e12/L    Hemoglobin 11.5 (L) 13.3 - 17.7 g/dL    Hematocrit 34.0 (L) 40.0 - 53.0 %    MCV 94 78 - 100 fl    MCH 31.9 26.5 - 33.0 pg    MCHC 33.8 31.5 - 36.5 g/dL    RDW 13.0 10.0 - 15.0 %    Platelet Count 246 150 - 450 10e9/L    Diff Method Automated Method     % Neutrophils 78.0 %    % Lymphocytes 11.9 %    % Monocytes 8.1 %    % Eosinophils 1.4 %    % Basophils 0.1 %    % Immature Granulocytes 0.5 %    Nucleated RBCs 0 0 /100    Absolute Neutrophil 7.5 1.6 - 8.3 10e9/L    Absolute Lymphocytes 1.2 0.8 - 5.3 10e9/L    Absolute Monocytes 0.8 0.0 - 1.3 10e9/L    Absolute Eosinophils 0.1 0.0 - 0.7 10e9/L    Absolute Basophils 0.0 0.0 - 0.2 10e9/L    Abs Immature Granulocytes 0.1 0 - 0.4 10e9/L    Absolute Nucleated RBC 0.0    Comprehensive metabolic panel   Result Value Ref Range    Sodium 134 133 - 144 mmol/L    Potassium 3.0 (L) 3.4 - 5.3 mmol/L    Chloride 99 94 - 109 mmol/L    Carbon Dioxide 24 20 - 32 mmol/L    Anion Gap 11 3 - 14 mmol/L    Glucose 174 (H) 70 - 99 mg/dL    Urea Nitrogen 13 7 - 30 mg/dL    Creatinine 0.67 0.66 - 1.25 mg/dL    GFR Estimate  >90 >60 mL/min/1.7m2    GFR Estimate If Black >90 >60 mL/min/1.7m2    Calcium 8.2 (L) 8.5 - 10.1 mg/dL    Bilirubin Total 0.7 0.2 - 1.3 mg/dL    Albumin 2.4 (L) 3.4 - 5.0 g/dL    Protein Total 6.8 6.8 - 8.8 g/dL    Alkaline Phosphatase 177 (H) 40 - 150 U/L    ALT 30 0 - 70 U/L    AST 25 0 - 45 U/L   Lipase   Result Value Ref Range    Lipase 175 73 - 393 U/L   UA with Microscopic   Result Value Ref Range    Color Urine Yellow     Appearance Urine Clear     Glucose Urine Negative NEG^Negative mg/dL    Bilirubin Urine Negative NEG^Negative    Ketones Urine 10 (A) NEG^Negative mg/dL    Specific Gravity Urine 1.014 1.003 - 1.035    Blood Urine Negative NEG^Negative    pH Urine 7.0 5.0 - 7.0 pH    Protein Albumin Urine 10 (A) NEG^Negative mg/dL    Urobilinogen mg/dL Normal 0.0 - 2.0 mg/dL    Nitrite Urine Negative NEG^Negative    Leukocyte Esterase Urine Negative NEG^Negative    Source Midstream Urine     WBC Urine 2 0 - 2 /HPF    RBC Urine 1 0 - 2 /HPF   Drug abuse screen 6 urine (chem dep)   Result Value Ref Range    Amphetamine Qual Urine Negative NEG^Negative    Barbiturates Qual Urine Negative NEG^Negative    Benzodiazepine Qual Urine Negative NEG^Negative    Cannabinoids Qual Urine Negative NEG^Negative    Cocaine Qual Urine Negative NEG^Negative    Ethanol Qual Urine Negative NEG^Negative    Opiates Qualitative Urine Negative NEG^Negative       Assessments & Plan (with Medical Decision Making)      50 year old male with a history of DM2, HIV/AIDS, CNS toxoplasmosis, and chronic abdominal pain who presents for second ED visit for evaluation of persistent abd pain and no bowel movement since surgery. IV established and labs sent remarkable for K+ 3.0. CT abd/pelvis obtained which was suggestive of abscess vs seroma. Zosyn administered in ED. Case discussed with Surgery who will come evaluate patient. Patient signed out to night team.      I have reviewed the nursing notes.    I have reviewed the findings, diagnosis,  plan and need for follow up with the patient.    New Prescriptions    No medications on file       Final diagnoses:   Peritoneal abscess (H)   INatalie, am serving as a trained medical scribe to document services personally performed by Tahmina Barkley MD, based on the provider's statements to me.   Tahmina KELLY MD, was physically present and have reviewed and verified the accuracy of this note documented by Natalie Partida.      2/4/2018   [facility], [place], EMERGENCY DEPARTMENT     Tahmina Barkley MD  02/10/18 0045

## 2018-02-04 NOTE — IP AVS SNAPSHOT
[address] [address]    Phone:  [phone]                                       After Visit Summary   2/4/2018    Andrew Lockwood    MRN: [id number]           After Visit Summary Signature Page     I have received my discharge instructions, and my questions have been answered. I have discussed any challenges I see with this plan with the nurse or doctor.    ..........................................................................................................................................  Patient/Patient Representative Signature      ..........................................................................................................................................  Patient Representative Print Name and Relationship to Patient    ..................................................               ................................................  Date                                            Time    ..........................................................................................................................................  Reviewed by Signature/Title    ...................................................              ..............................................  Date                                                            Time

## 2018-02-05 ENCOUNTER — APPOINTMENT (OUTPATIENT)
Dept: CT IMAGING | Facility: CLINIC | Age: 53
End: 2018-02-05
Attending: RADIOLOGY PRACTITIONER ASSISTANT
Payer: COMMERCIAL

## 2018-02-05 LAB
ANION GAP SERPL CALCULATED.3IONS-SCNC: 8 MMOL/L (ref 3–14)
BACTERIA SPEC CULT: NO GROWTH
BUN SERPL-MCNC: 7 MG/DL (ref 7–30)
CALCIUM SERPL-MCNC: 8.3 MG/DL (ref 8.5–10.1)
CHLORIDE SERPL-SCNC: 100 MMOL/L (ref 94–109)
CO2 SERPL-SCNC: 23 MMOL/L (ref 20–32)
COPATH REPORT: NORMAL
CREAT SERPL-MCNC: 0.66 MG/DL (ref 0.66–1.25)
ERYTHROCYTE [DISTWIDTH] IN BLOOD BY AUTOMATED COUNT: 13.3 % (ref 10–15)
GFR SERPL CREATININE-BSD FRML MDRD: >90 ML/MIN/1.7M2
GLUCOSE BLDC GLUCOMTR-MCNC: 100 MG/DL (ref 70–99)
GLUCOSE BLDC GLUCOMTR-MCNC: 106 MG/DL (ref 70–99)
GLUCOSE BLDC GLUCOMTR-MCNC: 187 MG/DL (ref 70–99)
GLUCOSE BLDC GLUCOMTR-MCNC: 199 MG/DL (ref 70–99)
GLUCOSE BLDC GLUCOMTR-MCNC: 275 MG/DL (ref 70–99)
GLUCOSE SERPL-MCNC: 98 MG/DL (ref 70–99)
GRAM STN SPEC: ABNORMAL
HCT VFR BLD AUTO: 32.7 % (ref 40–53)
HGB BLD-MCNC: 10.9 G/DL (ref 13.3–17.7)
INR PPP: 1.06 (ref 0.86–1.14)
LACTATE BLD-SCNC: 1.7 MMOL/L (ref 0.4–1.9)
MCH RBC QN AUTO: 31.8 PG (ref 26.5–33)
MCHC RBC AUTO-ENTMCNC: 33.3 G/DL (ref 31.5–36.5)
MCV RBC AUTO: 95 FL (ref 78–100)
PLATELET # BLD AUTO: 254 10E9/L (ref 150–450)
POTASSIUM SERPL-SCNC: 3.2 MMOL/L (ref 3.4–5.3)
POTASSIUM SERPL-SCNC: 3.6 MMOL/L (ref 3.4–5.3)
RBC # BLD AUTO: 3.43 10E12/L (ref 4.4–5.9)
SODIUM SERPL-SCNC: 131 MMOL/L (ref 133–144)
SPECIMEN SOURCE: ABNORMAL
SPECIMEN SOURCE: NORMAL
SPECIMEN SOURCE: NORMAL
WBC # BLD AUTO: 8.4 10E9/L (ref 4–11)
YEAST SPEC QL CULT: NORMAL

## 2018-02-05 PROCEDURE — 25000128 H RX IP 250 OP 636: Performed by: STUDENT IN AN ORGANIZED HEALTH CARE EDUCATION/TRAINING PROGRAM

## 2018-02-05 PROCEDURE — 25000132 ZZH RX MED GY IP 250 OP 250 PS 637: Performed by: STUDENT IN AN ORGANIZED HEALTH CARE EDUCATION/TRAINING PROGRAM

## 2018-02-05 PROCEDURE — 85027 COMPLETE CBC AUTOMATED: CPT | Performed by: STUDENT IN AN ORGANIZED HEALTH CARE EDUCATION/TRAINING PROGRAM

## 2018-02-05 PROCEDURE — 49406 IMAGE CATH FLUID PERI/RETRO: CPT

## 2018-02-05 PROCEDURE — 87205 SMEAR GRAM STAIN: CPT | Performed by: STUDENT IN AN ORGANIZED HEALTH CARE EDUCATION/TRAINING PROGRAM

## 2018-02-05 PROCEDURE — 87070 CULTURE OTHR SPECIMN AEROBIC: CPT | Performed by: STUDENT IN AN ORGANIZED HEALTH CARE EDUCATION/TRAINING PROGRAM

## 2018-02-05 PROCEDURE — 36415 COLL VENOUS BLD VENIPUNCTURE: CPT | Performed by: SURGERY

## 2018-02-05 PROCEDURE — C1729 CATH, DRAINAGE: HCPCS

## 2018-02-05 PROCEDURE — 80048 BASIC METABOLIC PNL TOTAL CA: CPT | Performed by: STUDENT IN AN ORGANIZED HEALTH CARE EDUCATION/TRAINING PROGRAM

## 2018-02-05 PROCEDURE — 25000131 ZZH RX MED GY IP 250 OP 636 PS 637: Performed by: STUDENT IN AN ORGANIZED HEALTH CARE EDUCATION/TRAINING PROGRAM

## 2018-02-05 PROCEDURE — 12000001 ZZH R&B MED SURG/OB UMMC

## 2018-02-05 PROCEDURE — 83605 ASSAY OF LACTIC ACID: CPT | Performed by: SURGERY

## 2018-02-05 PROCEDURE — 25000128 H RX IP 250 OP 636: Performed by: PHYSICIAN ASSISTANT

## 2018-02-05 PROCEDURE — 87186 SC STD MICRODIL/AGAR DIL: CPT | Performed by: STUDENT IN AN ORGANIZED HEALTH CARE EDUCATION/TRAINING PROGRAM

## 2018-02-05 PROCEDURE — 36415 COLL VENOUS BLD VENIPUNCTURE: CPT | Performed by: STUDENT IN AN ORGANIZED HEALTH CARE EDUCATION/TRAINING PROGRAM

## 2018-02-05 PROCEDURE — 87102 FUNGUS ISOLATION CULTURE: CPT | Performed by: STUDENT IN AN ORGANIZED HEALTH CARE EDUCATION/TRAINING PROGRAM

## 2018-02-05 PROCEDURE — 27211039 ZZH NEEDLE CR2

## 2018-02-05 PROCEDURE — 25000125 ZZHC RX 250: Performed by: PHYSICIAN ASSISTANT

## 2018-02-05 PROCEDURE — C1769 GUIDE WIRE: HCPCS

## 2018-02-05 PROCEDURE — 87075 CULTR BACTERIA EXCEPT BLOOD: CPT | Performed by: STUDENT IN AN ORGANIZED HEALTH CARE EDUCATION/TRAINING PROGRAM

## 2018-02-05 PROCEDURE — 87077 CULTURE AEROBIC IDENTIFY: CPT | Performed by: STUDENT IN AN ORGANIZED HEALTH CARE EDUCATION/TRAINING PROGRAM

## 2018-02-05 PROCEDURE — 0W9H30Z DRAINAGE OF RETROPERITONEUM WITH DRAINAGE DEVICE, PERCUTANEOUS APPROACH: ICD-10-PCS | Performed by: RADIOLOGY

## 2018-02-05 PROCEDURE — 27210738 ZZH ACCESSORY CR2

## 2018-02-05 PROCEDURE — 99152 MOD SED SAME PHYS/QHP 5/>YRS: CPT

## 2018-02-05 PROCEDURE — 87076 CULTURE ANAEROBE IDENT EACH: CPT | Performed by: STUDENT IN AN ORGANIZED HEALTH CARE EDUCATION/TRAINING PROGRAM

## 2018-02-05 PROCEDURE — 85610 PROTHROMBIN TIME: CPT | Performed by: STUDENT IN AN ORGANIZED HEALTH CARE EDUCATION/TRAINING PROGRAM

## 2018-02-05 PROCEDURE — 40000556 ZZH STATISTIC PERIPHERAL IV START W US GUIDANCE

## 2018-02-05 PROCEDURE — 00000146 ZZHCL STATISTIC GLUCOSE BY METER IP

## 2018-02-05 PROCEDURE — 27210903 ZZH KIT CR5

## 2018-02-05 RX ORDER — POTASSIUM CHLORIDE 1.5 G/1.58G
40 POWDER, FOR SOLUTION ORAL ONCE
Status: COMPLETED | OUTPATIENT
Start: 2018-02-05 | End: 2018-02-05

## 2018-02-05 RX ORDER — NALOXONE HYDROCHLORIDE 0.4 MG/ML
.1-.4 INJECTION, SOLUTION INTRAMUSCULAR; INTRAVENOUS; SUBCUTANEOUS
Status: DISCONTINUED | OUTPATIENT
Start: 2018-02-05 | End: 2018-02-05 | Stop reason: HOSPADM

## 2018-02-05 RX ORDER — NICOTINE POLACRILEX 4 MG
15-30 LOZENGE BUCCAL
Status: DISCONTINUED | OUTPATIENT
Start: 2018-02-05 | End: 2018-02-08 | Stop reason: HOSPADM

## 2018-02-05 RX ORDER — ACETAMINOPHEN 325 MG/1
650 TABLET ORAL EVERY 6 HOURS
Status: DISCONTINUED | OUTPATIENT
Start: 2018-02-05 | End: 2018-02-08 | Stop reason: HOSPADM

## 2018-02-05 RX ORDER — DIPHENHYDRAMINE HCL 25 MG
25 CAPSULE ORAL EVERY 6 HOURS PRN
Status: DISCONTINUED | OUTPATIENT
Start: 2018-02-05 | End: 2018-02-08 | Stop reason: HOSPADM

## 2018-02-05 RX ORDER — POLYETHYLENE GLYCOL 3350 17 G/17G
17 POWDER, FOR SOLUTION ORAL DAILY
Status: DISCONTINUED | OUTPATIENT
Start: 2018-02-05 | End: 2018-02-06

## 2018-02-05 RX ORDER — FLUMAZENIL 0.1 MG/ML
0.2 INJECTION, SOLUTION INTRAVENOUS
Status: DISCONTINUED | OUTPATIENT
Start: 2018-02-05 | End: 2018-02-05 | Stop reason: HOSPADM

## 2018-02-05 RX ORDER — AMPICILLIN AND SULBACTAM 2; 1 G/1; G/1
3 INJECTION, POWDER, FOR SOLUTION INTRAMUSCULAR; INTRAVENOUS
Status: DISCONTINUED | OUTPATIENT
Start: 2018-02-05 | End: 2018-02-05 | Stop reason: CLARIF

## 2018-02-05 RX ORDER — FENTANYL CITRATE 50 UG/ML
25-50 INJECTION, SOLUTION INTRAMUSCULAR; INTRAVENOUS EVERY 5 MIN PRN
Status: DISCONTINUED | OUTPATIENT
Start: 2018-02-05 | End: 2018-02-05 | Stop reason: HOSPADM

## 2018-02-05 RX ORDER — DEXTROSE MONOHYDRATE 25 G/50ML
25-50 INJECTION, SOLUTION INTRAVENOUS
Status: DISCONTINUED | OUTPATIENT
Start: 2018-02-05 | End: 2018-02-08 | Stop reason: HOSPADM

## 2018-02-05 RX ADMIN — GABAPENTIN 300 MG: 300 CAPSULE ORAL at 08:00

## 2018-02-05 RX ADMIN — Medication 0.2 MG: at 17:51

## 2018-02-05 RX ADMIN — OXYCODONE HYDROCHLORIDE 10 MG: 5 TABLET ORAL at 04:51

## 2018-02-05 RX ADMIN — INSULIN ASPART 1 UNITS: 100 INJECTION, SOLUTION INTRAVENOUS; SUBCUTANEOUS at 19:07

## 2018-02-05 RX ADMIN — OXYCODONE HYDROCHLORIDE 10 MG: 5 TABLET ORAL at 22:58

## 2018-02-05 RX ADMIN — FENTANYL CITRATE 100 MCG: 50 INJECTION, SOLUTION INTRAMUSCULAR; INTRAVENOUS at 12:18

## 2018-02-05 RX ADMIN — POTASSIUM CHLORIDE 40 MEQ: 1.5 POWDER, FOR SOLUTION ORAL at 18:29

## 2018-02-05 RX ADMIN — Medication 0.2 MG: at 00:19

## 2018-02-05 RX ADMIN — DOCUSATE SODIUM 286 ML: 50 LIQUID ORAL at 12:41

## 2018-02-05 RX ADMIN — OXYCODONE HYDROCHLORIDE 10 MG: 5 TABLET ORAL at 12:41

## 2018-02-05 RX ADMIN — SODIUM CHLORIDE, POTASSIUM CHLORIDE, SODIUM LACTATE AND CALCIUM CHLORIDE: 600; 310; 30; 20 INJECTION, SOLUTION INTRAVENOUS at 06:28

## 2018-02-05 RX ADMIN — ELVITEGRAVIR, COBICISTAT, EMTRICITABINE, AND TENOFOVIR ALAFENAMIDE 1 TABLET: 150; 150; 200; 10 TABLET ORAL at 19:11

## 2018-02-05 RX ADMIN — Medication 0.2 MG: at 13:49

## 2018-02-05 RX ADMIN — PIPERACILLIN SODIUM AND TAZOBACTAM SODIUM 3.38 G: 36; 4.5 INJECTION, POWDER, FOR SOLUTION INTRAVENOUS at 20:32

## 2018-02-05 RX ADMIN — OXYCODONE HYDROCHLORIDE 10 MG: 5 TABLET ORAL at 01:38

## 2018-02-05 RX ADMIN — OXYCODONE HYDROCHLORIDE 10 MG: 5 TABLET ORAL at 16:18

## 2018-02-05 RX ADMIN — PIPERACILLIN SODIUM AND TAZOBACTAM SODIUM 3.38 G: 36; 4.5 INJECTION, POWDER, FOR SOLUTION INTRAVENOUS at 08:00

## 2018-02-05 RX ADMIN — POLYETHYLENE GLYCOL 3350 17 G: 17 POWDER, FOR SOLUTION ORAL at 12:41

## 2018-02-05 RX ADMIN — GABAPENTIN 300 MG: 300 CAPSULE ORAL at 20:32

## 2018-02-05 RX ADMIN — ACETAMINOPHEN 650 MG: 325 TABLET, FILM COATED ORAL at 16:07

## 2018-02-05 RX ADMIN — PIPERACILLIN SODIUM AND TAZOBACTAM SODIUM 3.38 G: 36; 4.5 INJECTION, POWDER, FOR SOLUTION INTRAVENOUS at 13:49

## 2018-02-05 RX ADMIN — LIDOCAINE HYDROCHLORIDE 10 ML: 10 INJECTION, SOLUTION EPIDURAL; INFILTRATION; INTRACAUDAL; PERINEURAL at 12:18

## 2018-02-05 RX ADMIN — MIDAZOLAM 2 MG: 1 INJECTION INTRAMUSCULAR; INTRAVENOUS at 12:18

## 2018-02-05 RX ADMIN — OXYCODONE HYDROCHLORIDE 10 MG: 5 TABLET ORAL at 08:00

## 2018-02-05 RX ADMIN — GABAPENTIN 300 MG: 300 CAPSULE ORAL at 13:49

## 2018-02-05 RX ADMIN — Medication 0.2 MG: at 06:55

## 2018-02-05 RX ADMIN — ACETAMINOPHEN 650 MG: 325 TABLET, FILM COATED ORAL at 22:58

## 2018-02-05 RX ADMIN — PIPERACILLIN SODIUM AND TAZOBACTAM SODIUM 3.38 G: 36; 4.5 INJECTION, POWDER, FOR SOLUTION INTRAVENOUS at 00:19

## 2018-02-05 ASSESSMENT — PAIN DESCRIPTION - DESCRIPTORS
DESCRIPTORS: ACHING;SORE
DESCRIPTORS: ACHING
DESCRIPTORS: ACHING
DESCRIPTORS: ACHING;SORE
DESCRIPTORS: ACHING;SORE
DESCRIPTORS: ACHING

## 2018-02-05 NOTE — PROGRESS NOTES
Patient Name: Andrew Lockwood  Medical Record Number: [id number]  Today's Date: 2/5/2018    Procedure: Abscess drain placement  Proceduralist: Dr. Flroes    Sedation start time: 1140  Sedation end time: 1200  Sedation medications administered: 2 mg Versed, 100 mcg Fentanyl   Total sedation time: 20 min    Procedure start time: 1140  Puncture time: 1145  Procedure end time: 1200    Report given to: Naye ELLSWORTH    Other Notes: Pt arrived to CT 2  from . Pt denies any questions or concerns regarding procedure. Pt positioned supine and monitored per protocol. Pt tolerated procedure without any noted complications. Pt transferred back to .

## 2018-02-05 NOTE — H&P
General Surgery Admission History and Physical    Andrew Lockwood MRN# [id number]     Date of Admission: 2/4/2018    CC: Abdominal pain    Assessment: 50 year old male with history of HIV, CMB, CNS toxoplasmosis, DM2, DANISHA, now s/p lap appendectomy on 1/31/18 for gangrenous appendicitis, here with increased pain since discharge. CT scan with 8cm RLQ fluid collection concerning for abscess. WBC 9.7, clinically stable.    Plan:   - Admit to surgery  - NPO, IVF  - Will contact IR in AM for drainage  - Zosyn    Discussed with chief resident, Dr. Mcnair.    HPI: 50 year old male with history of HIV, CMB, CNS toxoplasmosis, DM2, DANISHA, now s/p lap appendectomy on 1/31/18 for gangrenous appendicitis, here with increased pain since discharge. States since discharge, pain has been increasing over RLQ. No nausea/vomiting, but does have some constipation and not able to have BM since 5 days ago. Last passed flatus 2 days ago. No fever/chills. No other complaints.    Past Medical History:  Past Medical History:   Diagnosis Date     AIDS (H)      Allergic rhinitis due to other allergen     DNS     Chronic abdominal pain      CNS toxoplasmosis (H)      Diabetes type 2, controlled (H)      GERD (gastroesophageal reflux disease)      HIV (human immunodeficiency virus infection) (H)      Periungual wart      Sleep apnea     doesn't use CPAP       Past Surgical History:  Past Surgical History:   Procedure Laterality Date     C NONSPECIFIC PROCEDURE      right forearm fracture     COLONOSCOPY Left 1/22/2016    Procedure: COMBINED COLONOSCOPY, SINGLE OR MULTIPLE BIOPSY/POLYPECTOMY BY BIOPSY;  Surgeon: Clark Saini MD;  Location: [facility]     HC EXPLORE UNDESC TESTIS,INGUIN/SCROTAL       LAPAROSCOPIC APPENDECTOMY N/A 1/31/2018    Procedure: LAPAROSCOPIC APPENDECTOMY;  LAPAROSCOPIC APPENDECTOMY;  Surgeon: Dawn Holt MD;  Location: [facility]     LAPAROSCOPY DIAGNOSTIC (GENERAL) N/A 7/26/2016    Procedure: LAPAROSCOPY  DIAGNOSTIC (GENERAL);  Surgeon: Susannah Arriaga MD;  Location: [facility]     OPTICAL TRACKING SYSTEM CRANIOTOMY, EXCISE TUMOR, COMBINED Left 4/10/2015    Procedure: COMBINED OPTICAL TRACKING SYSTEM CRANIOTOMY, EXCISE TUMOR;  Surgeon: Mirlande Colmenares MD;  Location: [facility]     REPAIR GAMEKEEPER'S THUMB Right 12/2/2016    Procedure: REPAIR LIGAMENT ULNAR COLLATERAL THUMB (GAMEKEEPER'S);  Surgeon: Evin Zamorano MD;  Location: [facility]       Allergies:     Allergies   Allergen Reactions     Metformin      Abdominal pain     Milk [Lac Bovis] Hives     Tylenol [Acetaminophen] Itching       Medications:    Current Facility-Administered Medications on File Prior to Encounter:  [COMPLETED] ibuprofen (ADVIL/MOTRIN) tablet 800 mg   [COMPLETED] acetaminophen (TYLENOL) tablet 1,000 mg   [COMPLETED] potassium chloride (KAYCIEL) solution 40 mEq     Current Outpatient Prescriptions on File Prior to Encounter:  ibuprofen (ADVIL/MOTRIN) 200 MG tablet Take 1-2 tablets (200-400 mg) by mouth every 6 hours as needed for moderate pain   polyethylene glycol (MIRALAX/GLYCOLAX) Packet Take 17 g by mouth daily as needed for constipation   oxyCODONE IR (ROXICODONE) 5 MG tablet Take 1 tablet (5 mg) by mouth every 4 hours as needed for pain maximum 6 tablet(s) per day   alum & mag hydroxide-simethicone (MAALOX ADVANCED MAX ST) 400-400-40 MG/5ML SUSP suspension Take 30 mLs by mouth every 4 hours as needed for indigestion   blood glucose monitoring (NO BRAND SPECIFIED) meter device kit Use to test blood sugar 4 times daily or as directed.   blood glucose monitoring (NO BRAND SPECIFIED) test strip 1 strip by In Vitro route 4 times daily (before meals and nightly) Use to test blood sugars 4 times daily or as directed   glipiZIDE (GLUCOTROL) 5 MG tablet Take 0.5-1 tablets (2.5-5 mg) by mouth daily   gabapentin (NEURONTIN) 300 MG capsule Take 1 capsule (300 mg) by mouth 3 times daily   GENVOYA 433-224-625-10 mg tablet Take 1 tablet by mouth  "daily   blood glucose monitoring (NO BRAND SPECIFIED) meter device kit Use to test blood sugar four times daily or as directed.   blood glucose monitoring (NO BRAND SPECIFIED) test strip Use to test blood sugars four times daily or as directed   blood glucose (NO BRAND SPECIFIED) lancets standard Use to test blood sugar four times daily or as directed.       Social History:  Social History     Social History     Marital status: Single     Spouse name: N/A     Number of children: N/A     Years of education: N/A     Occupational History           5 years; temp agency     Social History Main Topics     Smoking status: Current Every Day Smoker     Packs/day: 0.20     Last attempt to quit: 10/28/2016     Smokeless tobacco: Former User      Comment: 4 cigarettes     Alcohol use No      Comment: Last etoh in 2007     Drug use: No      Comment: \"Not very often\"     Sexual activity: Not Currently     Partners: Female, Male      Comment: Last sexual activity 2008     Other Topics Concern     Not on file     Social History Narrative    Born in [facility].  Came to the [place] in 1993.  Last traveled to visit family in 2008.            Family History:  Family History   Problem Relation Age of Onset     DIABETES Brother      DIABETES Father      Alzheimer Disease Father      Unknown/Adopted Mother      DIABETES Paternal Grandfather      CANCER No family hx of      no skin cancer     Skin Cancer No family hx of      no famiy hx of skin cancer       ROS:  Negative except for HPI    Exam:  /66  Pulse 89  Temp 99.2  F (37.3  C) (Oral)  Resp 16  Ht 1.651 m (5' 5\")  Wt 76.8 kg (169 lb 5 oz)  SpO2 100%  BMI 28.18 kg/m2  General: Alert, interactive, NAD  Resp: CTAB, no crackles or wheezes  Cardiac: RRR, normal S1,S2, peripheral pulses 2+  Abdomen: Soft, minor distension, tender in RLQ. Incisions c/d/i, dermabonded, slight surrounding bruise. No diffuse peritonitis.  Extremities: No LE edema or obvious joint " abnormalities  Skin: Warm and dry, no jaundice or rash    Labs:   BMP  Recent Labs  Lab 02/04/18  1531 02/03/18 1919 01/31/18  1155 01/30/18 2039    132* 135 138   POTASSIUM 3.0* 3.2* 3.8 3.6   CHLORIDE 99 98 101 105   CO2 24 25 26 24   BUN 13 11 12 15   CR 0.67 0.64* 0.74 0.79   * 200* 186* 159*     CBC  Recent Labs  Lab 02/04/18  1531 02/03/18 1919 01/31/18  1155 01/30/18 2039   WBC 9.7 8.9 17.1* 16.5*   HGB 11.5* 11.3* 13.5 14.0    220 189 234     LFT  Recent Labs  Lab 02/04/18  1531 02/03/18 1919 01/31/18  1155 01/30/18 2039   AST 25 28 20 22   ALT 30 25 30 35   ALKPHOS 177* 148 83 106   BILITOTAL 0.7 1.4* 1.2 0.4   ALBUMIN 2.4* 2.4* 3.5 3.8       Recent Labs  Lab 02/04/18  1531 02/03/18 1919 02/01/18  1211 02/01/18  0553 01/31/18  2311 01/31/18 2109 01/31/18 1155 01/30/18 2039   * 200*  --   --   --   --  186* 159*   BGM  --   --  181* 206* 166* 175*  --   --        Imaging:   CT 2/4/18  Preliminary results. Full dictation to follow. Increased inflammatory  fatty stranding in the right lower quadrant at site of previous  appendicitis with a new fluid collection measuring up to 8.5 cm. This  has simple fluid attenuation however increased inflammation raises  concern for developing abscess.     [Urgent Result: Increased inflammatory changes, possible abscess]    Trevin Lazo MD   Surgery PGY-2

## 2018-02-05 NOTE — PLAN OF CARE
Problem: Patient Care Overview  Goal: Plan of Care/Patient Progress Review  Outcome: No Change  AVSS. Pt reports abdominal pain, given 10 mg oxycodone x2 and 0.2 mg of IV Dilaudid with relief. Denies nausea, NPO for possible IR intervention today. Passing flatus but has not had a BM in 5 days. Given a suppository on evenings with no results. Voiding adequate amounts. Abdominal incisions are c/d/i. Up ab lexi. Ambulating in room. Plan for possible IR drainage of abscess today. Continue to monitor pain control, s/s infection, and bowel function.

## 2018-02-05 NOTE — PROCEDURES
Interventional Radiology Brief Post Procedure Note    Procedure: CT RETROPERITONEAL ABSCESS DRAIN W CATH PLACE    Proceduralist: Mahesh Flores MD    Assistant: None    Time Out: Prior to the start of the procedure and with procedural staff participation, I verbally confirmed the patient s identity using two indicators, relevant allergies, that the procedure was appropriate and matched the consent or emergent situation, and that the correct equipment/implants were available. Immediately prior to starting the procedure I conducted the Time Out with the procedural staff and re-confirmed the patient s name, procedure, and site/side. (The Joint Commission universal protocol was followed.)  Yes    Medications   Medication Event Details Admin User Admin Time       Sedation: IR Nurse Monitored Care   Post Procedure Summary:  Prior to the start of the procedure and with procedural staff participation, I verbally confirmed the patient s identity using two indicators, relevant allergies, that the procedure was appropriate and matched the consent or emergent situation, and that the correct equipment/implants were available. Immediately prior to starting the procedure I conducted the Time Out with the procedural staff and re-confirmed the patient s name, procedure, and site/side. (The Joint Commission universal protocol was followed.)  Yes       Sedatives: Fentanyl and Midazolam (Versed)    Vital signs, airway and pulse oximetry were monitored and remained stable throughout the procedure and sedation was maintained until the procedure was complete.  The patient was monitored by staff until sedation discharge criteria were met.    Patient tolerance: Patient tolerated the procedure well with no immediate complications.    Time of sedation in minutes: 15 Minutes minutes from beginning to end of physician one to one monitoring.          Findings: thin, malodorous, purulent material returned.     Estimated Blood Loss:  Minimal    Fluoroscopy Time:  minute(s)    SPECIMENS: Fluid and/or tissue for Gram stain and culture    Complications: 1. None     Condition: Stable    Plan: [place] drainage.  Irrigate with 10cc every shift. Monitor outputs.  Sinogram in IR in one week with further drain mgmt decisions to be made at that time.     Comments: See dictated procedure note for full details.    Mahesh Flores MD

## 2018-02-05 NOTE — PROGRESS NOTES
Surgery Progress Note    S:  No events overnight.  Pt seen at bedside resting comfortably. Pain fairly controlled.  Denies F/C/Sweats.  No BM.      O:  Vitals:    02/04/18 1800 02/04/18 2000 02/04/18 2031 02/04/18 2214   BP: 117/66 123/82 126/82 117/79   BP Location:    Right arm   Pulse:   88 96   Resp:   14 18   Temp:   98.3  F (36.8  C) 98.2  F (36.8  C)   TempSrc:   Oral Oral   SpO2: 100% 98% 96% 97%   Weight:       Height:           A&Ox3, NAD  Breathing non-labored  RRR  Soft, moderately tender in epigastric and RLQ, moderately distended, non-peritonitic, no guarding  Distal extremities warm.   Incisions c/d/i      BMP  Recent Labs  Lab 02/04/18  2345 02/04/18  1531 02/03/18 1919 01/31/18  1155 01/30/18 2039   NA  --  134 132* 135 138   POTASSIUM 3.6 3.0* 3.2* 3.8 3.6   CHLORIDE  --  99 98 101 105   LINDA  --  8.2* 8.1* 9.0 9.2   CO2  --  24 25 26 24   BUN  --  13 11 12 15   CR  --  0.67 0.64* 0.74 0.79   GLC  --  174* 200* 186* 159*        CBC  Recent Labs  Lab 02/04/18  1531 02/03/18  1919 01/31/18  1155 01/30/18 2039   WBC 9.7 8.9 17.1* 16.5*   RBC 3.61* 3.56* 4.21* 4.35*   HGB 11.5* 11.3* 13.5 14.0   HCT 34.0* 33.4* 40.1 40.9   MCV 94 94 95 94   MCH 31.9 31.7 32.1 32.2   MCHC 33.8 33.8 33.7 34.2   RDW 13.0 13.0 13.6 13.3    220 189 234              A/P: Andrew Lockwood is a 50 year old male with HIV, CMB, CNS toxo, DM2, s/p lap appy on 1/31 for gangrenous appendicitis, now presenting with increased abdominal pain and RLQ fluid collection concerning for abscess.    -Pain: gabapentin, dilaudid, oxycodone PRN  -Diet: NPO for potential IR drainage today  - Pink lady enema today for constipation  -Continue to follow I/Os  -Activity: up ad lexi  -Continue IV zosyn  -DVT ppx with SCDs and ambulation  -Continue home meds    Kevin Santana MD  PGY-1 Surgery  pager [phone]

## 2018-02-05 NOTE — PROGRESS NOTES
Interventional Radiology Pre-Procedure Sedation Assessment   Time of Assessment: 11:22 AM    Expected Level: Moderate Sedation    Indication: Sedation is required for the following type of Procedure: Drain    Sedation and procedural consent: Risks, benefits and alternatives were discussed with Patient    PO Intake: Appropriately NPO for procedure    ASA Class: Class 2 - MILD SYSTEMIC DISEASE, NO ACUTE PROBLEMS, NO FUNCTIONAL LIMITATIONS.    Mallampati: Grade 2:  Soft palate, base of uvula, tonsillar pillars, and portion of posterior pharyngeal wall visible    Lungs: Lungs Clear with good breath sounds bilaterally    Heart: Normal heart sounds and rate    History and physical reviewed and no updates needed. I have reviewed the lab findings, diagnostic data, medications, and the plan for sedation. I have determined this patient to be an appropriate candidate for the planned sedation and procedure and have reassessed the patient IMMEDIATELY PRIOR to sedation and procedure.    Lavell Moseley PA-C

## 2018-02-05 NOTE — CONSULTS
Patient is on IR schedule 2/5/18  for a CT guided RLQ drain placement  . Labs WNL for procedure.   Orders for NPO, scrubs and antibiotics have been entered.   Consent will be done prior to procedure.   ts and consent is in IR.     Please contact the IR charge RN at [phone] for estimated time of procedure.     Shelli Orozco IR RPA  [phone] Call pager  [phone] pager

## 2018-02-05 NOTE — PROGRESS NOTES
Admitted to [facility] from ED at approx 2015.   A&O. X4, ambulating independently.  Voided on admission, did not save. Admission profile completed, Med Rec done.  NPO x meds for possible IR tomorrow.  No BM since before surgery on 1/31.  Abdominal lap sites approximated, no redness or drainage noted.  Abdomen distended, BS active in all quadrants, denies passing flatus.  Dulcolax supp given per pt request.  Will start IVF when IV pump available.

## 2018-02-05 NOTE — PLAN OF CARE
Problem: Patient Care Overview  Goal: Plan of Care/Patient Progress Review  Outcome: No Change  Patient is alert and oriented. VSS on room air. C/o pain to right side of abdomen. Received prn oxycodone, and hydromorphone with partial relief. Denies any nausea. Regular diet started. Previously NPO. Pt is diabetic Blood sugars started, and stable. Had IR drain placement today. Drain placed to right side. Dressing clean, dry, and intact. Abdomen distended. Last bowel movement three days ago. Enema given. Old lap sites clean, dry and intact. Open to air.  Awaiting results. K 3.2 MD updated. No new orders. Will continue to monitor and plan of care.

## 2018-02-05 NOTE — PROGRESS NOTES
Care Coordinator Progress Note     Admission Date/Time:  2/4/2018  Attending MD:  Lam Hines MD     Data  Chart reviewed, discussed with interdisciplinary team.   Patient was admitted for: Peritoneal abscess (H).    Concerns with insurance coverage for discharge needs: insurance coverage is inadequate.Medicad for HIV medications only per patient  Current Living Situation: Patient Homeless.  Support System: Has community support through  with infections disease clinic and Cathryn GRAY with [employer] [facility]  Services Involved: OP infectious disease clinic  Transportation: patient states he has a bus pass  Barriers to Discharge: Medical Clearance     Past Medical History:  AIDS (H)    Allergic rhinitis due to other allergen    DNS  Chronic abdominal pain    CNS toxoplasmosis (H)    Diabetes type 2, controlled (H)    GERD (gastroesophageal reflux disease)    HIV (human immunodeficiency virus infection) (H)    Periungual wart        Current History: per chart  Andrew Lockwood is a 50 year old male with a history notable for DM2, HIV/AIDS, CNS toxoplasmosis, and chronic abdominal pain who presents to the ED for the evaluation of abdominal pain. The patient is now 2 days status post appendectomy, and he states that he did not fill his pain medications secondary to a lack of financial means. He states that he is now experiencing severe lower quadrant abdominal pain, and he is seeking some means of pain control. The patient reports that he was given prescriptions for ibuprofen, Tylenol, and oxycodone at discharge, but he is not able to furnish the actual prescription for those medications at this time. He also states that he did not call into his surgery team's office as he does not have their phone number. No fevers, though he states he has had some diarrhea and poor PO intake. The patient offers no other concerns or complaints at this time.    7 ED visits since 12/31/17    Current Plan of Care:    "CT guided RLQ drain placement      Assessment  This RNCC met with patient at bedside to discuss discharge planning, patient states that [facility] but [facility] their in December as there was some issues with his [facility] (group housing wali); patient states that the first week he left he just road the light rail all night to sleep but he and his partner, Bret Stahl, rodger currently staying with a friend (Elieser Kwong). Patient originally requested to see a SW to help him find a 1 bedroom apartment in [place] at discharge, this CC informed patient that our hospital social workers do not do this, patient then stated that his [employer] SW Cathryn is helping him with this issue.  patient states he will go back to his friends home . Elieser Kwong until he has his own apartment. Patient sates he has a bus card and will take a bus to his friends home when discharge is imminent.     Plan  Anticipated Discharge Date:  TBD  Anticipated Discharge Plan:  Home    Ashley Garrido RN        "

## 2018-02-05 NOTE — PLAN OF CARE
Problem: Patient Care Overview  Goal: Plan of Care/Patient Progress Review  VSS, afebrile, independently positioning. Had flatus only after suppository was given.  Denies nausea. Dilaudid given x1 for abdominal pain with improvement.  IVF started.  BG at 2200 was 122.  NPO for possible IR tomorrow.  Continue to manage pain.

## 2018-02-06 ENCOUNTER — TELEPHONE (OUTPATIENT)
Dept: FAMILY MEDICINE | Facility: CLINIC | Age: 53
End: 2018-02-06

## 2018-02-06 LAB
GLUCOSE BLDC GLUCOMTR-MCNC: 141 MG/DL (ref 70–99)
GLUCOSE BLDC GLUCOMTR-MCNC: 150 MG/DL (ref 70–99)
GLUCOSE BLDC GLUCOMTR-MCNC: 174 MG/DL (ref 70–99)

## 2018-02-06 PROCEDURE — 00000146 ZZHCL STATISTIC GLUCOSE BY METER IP

## 2018-02-06 PROCEDURE — 25000132 ZZH RX MED GY IP 250 OP 250 PS 637: Performed by: STUDENT IN AN ORGANIZED HEALTH CARE EDUCATION/TRAINING PROGRAM

## 2018-02-06 PROCEDURE — 25000128 H RX IP 250 OP 636: Performed by: SURGERY

## 2018-02-06 PROCEDURE — 25000128 H RX IP 250 OP 636: Performed by: STUDENT IN AN ORGANIZED HEALTH CARE EDUCATION/TRAINING PROGRAM

## 2018-02-06 PROCEDURE — 25000132 ZZH RX MED GY IP 250 OP 250 PS 637: Performed by: SURGERY

## 2018-02-06 PROCEDURE — 12000001 ZZH R&B MED SURG/OB UMMC

## 2018-02-06 PROCEDURE — 40000556 ZZH STATISTIC PERIPHERAL IV START W US GUIDANCE

## 2018-02-06 RX ORDER — POLYETHYLENE GLYCOL 3350 17 G/17G
17 POWDER, FOR SOLUTION ORAL 2 TIMES DAILY
Status: DISCONTINUED | OUTPATIENT
Start: 2018-02-06 | End: 2018-02-08 | Stop reason: HOSPADM

## 2018-02-06 RX ORDER — ACETAMINOPHEN 325 MG/1
650 TABLET ORAL EVERY 6 HOURS
Qty: 100 TABLET | Refills: 0 | Status: SHIPPED | OUTPATIENT
Start: 2018-02-06 | End: 2018-02-22

## 2018-02-06 RX ORDER — BISACODYL 10 MG
10 SUPPOSITORY, RECTAL RECTAL DAILY PRN
Status: DISCONTINUED | OUTPATIENT
Start: 2018-02-06 | End: 2018-02-06

## 2018-02-06 RX ORDER — SIMETHICONE 80 MG
80 TABLET,CHEWABLE ORAL EVERY 6 HOURS PRN
Status: DISCONTINUED | OUTPATIENT
Start: 2018-02-06 | End: 2018-02-08 | Stop reason: HOSPADM

## 2018-02-06 RX ORDER — METRONIDAZOLE 500 MG/1
500 TABLET ORAL EVERY 12 HOURS SCHEDULED
Status: DISCONTINUED | OUTPATIENT
Start: 2018-02-06 | End: 2018-02-08 | Stop reason: HOSPADM

## 2018-02-06 RX ORDER — AMOXICILLIN 250 MG
1 CAPSULE ORAL 2 TIMES DAILY
Status: DISCONTINUED | OUTPATIENT
Start: 2018-02-06 | End: 2018-02-08 | Stop reason: HOSPADM

## 2018-02-06 RX ORDER — CALCIUM CARBONATE 500 MG/1
500 TABLET, CHEWABLE ORAL DAILY PRN
Status: DISCONTINUED | OUTPATIENT
Start: 2018-02-06 | End: 2018-02-08 | Stop reason: HOSPADM

## 2018-02-06 RX ORDER — CIPROFLOXACIN 500 MG/1
500 TABLET, FILM COATED ORAL EVERY 12 HOURS
Qty: 26 TABLET | Refills: 0 | Status: SHIPPED | OUTPATIENT
Start: 2018-02-06 | End: 2018-02-08

## 2018-02-06 RX ORDER — KETOROLAC TROMETHAMINE 30 MG/ML
15 INJECTION, SOLUTION INTRAMUSCULAR; INTRAVENOUS ONCE
Status: COMPLETED | OUTPATIENT
Start: 2018-02-06 | End: 2018-02-06

## 2018-02-06 RX ORDER — AMOXICILLIN 250 MG
1 CAPSULE ORAL 2 TIMES DAILY PRN
Qty: 50 TABLET | Refills: 0 | Status: SHIPPED | OUTPATIENT
Start: 2018-02-06 | End: 2018-02-22

## 2018-02-06 RX ORDER — OXYCODONE HYDROCHLORIDE 5 MG/1
5-10 TABLET ORAL EVERY 4 HOURS PRN
Status: DISCONTINUED | OUTPATIENT
Start: 2018-02-06 | End: 2018-02-08 | Stop reason: HOSPADM

## 2018-02-06 RX ORDER — POLYETHYLENE GLYCOL 3350 17 G/17G
17 POWDER, FOR SOLUTION ORAL 2 TIMES DAILY
Qty: 24 PACKET | Refills: 0 | Status: SHIPPED | OUTPATIENT
Start: 2018-02-06 | End: 2018-02-22

## 2018-02-06 RX ORDER — CIPROFLOXACIN 500 MG/1
500 TABLET, FILM COATED ORAL EVERY 12 HOURS SCHEDULED
Status: DISCONTINUED | OUTPATIENT
Start: 2018-02-06 | End: 2018-02-08 | Stop reason: HOSPADM

## 2018-02-06 RX ORDER — METRONIDAZOLE 500 MG/1
500 TABLET ORAL 2 TIMES DAILY
Qty: 26 TABLET | Refills: 0 | Status: ON HOLD | OUTPATIENT
Start: 2018-02-06 | End: 2018-02-12

## 2018-02-06 RX ADMIN — ELVITEGRAVIR, COBICISTAT, EMTRICITABINE, AND TENOFOVIR ALAFENAMIDE 1 TABLET: 150; 150; 200; 10 TABLET ORAL at 09:41

## 2018-02-06 RX ADMIN — GABAPENTIN 300 MG: 300 CAPSULE ORAL at 11:42

## 2018-02-06 RX ADMIN — CIPROFLOXACIN HYDROCHLORIDE 500 MG: 500 TABLET, FILM COATED ORAL at 21:21

## 2018-02-06 RX ADMIN — OXYCODONE HYDROCHLORIDE 10 MG: 5 TABLET ORAL at 05:28

## 2018-02-06 RX ADMIN — ACETAMINOPHEN 650 MG: 325 TABLET, FILM COATED ORAL at 05:28

## 2018-02-06 RX ADMIN — OXYCODONE HYDROCHLORIDE 10 MG: 5 TABLET ORAL at 18:45

## 2018-02-06 RX ADMIN — PIPERACILLIN SODIUM AND TAZOBACTAM SODIUM 3.38 G: 36; 4.5 INJECTION, POWDER, FOR SOLUTION INTRAVENOUS at 02:06

## 2018-02-06 RX ADMIN — ACETAMINOPHEN 650 MG: 325 TABLET, FILM COATED ORAL at 17:28

## 2018-02-06 RX ADMIN — SENNOSIDES AND DOCUSATE SODIUM 2 TABLET: 8.6; 5 TABLET ORAL at 21:21

## 2018-02-06 RX ADMIN — ACETAMINOPHEN 650 MG: 325 TABLET, FILM COATED ORAL at 09:41

## 2018-02-06 RX ADMIN — OXYCODONE HYDROCHLORIDE 10 MG: 5 TABLET ORAL at 23:01

## 2018-02-06 RX ADMIN — DOCUSATE SODIUM 286 ML: 50 LIQUID ORAL at 11:48

## 2018-02-06 RX ADMIN — GABAPENTIN 300 MG: 300 CAPSULE ORAL at 21:21

## 2018-02-06 RX ADMIN — INSULIN ASPART 1 UNITS: 100 INJECTION, SOLUTION INTRAVENOUS; SUBCUTANEOUS at 08:46

## 2018-02-06 RX ADMIN — KETOROLAC TROMETHAMINE 15 MG: 30 INJECTION, SOLUTION INTRAMUSCULAR at 11:42

## 2018-02-06 RX ADMIN — OXYCODONE HYDROCHLORIDE 10 MG: 5 TABLET ORAL at 09:41

## 2018-02-06 RX ADMIN — POLYETHYLENE GLYCOL 3350 17 G: 17 POWDER, FOR SOLUTION ORAL at 08:44

## 2018-02-06 RX ADMIN — SENNOSIDES AND DOCUSATE SODIUM 1 TABLET: 8.6; 5 TABLET ORAL at 21:22

## 2018-02-06 RX ADMIN — METRONIDAZOLE 500 MG: 500 TABLET ORAL at 21:21

## 2018-02-06 RX ADMIN — OXYCODONE HYDROCHLORIDE 10 MG: 5 TABLET ORAL at 14:53

## 2018-02-06 RX ADMIN — OXYCODONE HYDROCHLORIDE 10 MG: 5 TABLET ORAL at 02:06

## 2018-02-06 RX ADMIN — Medication 0.2 MG: at 00:30

## 2018-02-06 RX ADMIN — Medication 2.5 MG: at 21:20

## 2018-02-06 RX ADMIN — SENNOSIDES AND DOCUSATE SODIUM 1 TABLET: 8.6; 5 TABLET ORAL at 08:45

## 2018-02-06 RX ADMIN — PIPERACILLIN SODIUM AND TAZOBACTAM SODIUM 3.38 G: 36; 4.5 INJECTION, POWDER, FOR SOLUTION INTRAVENOUS at 08:44

## 2018-02-06 RX ADMIN — INSULIN ASPART 1 UNITS: 100 INJECTION, SOLUTION INTRAVENOUS; SUBCUTANEOUS at 12:52

## 2018-02-06 ASSESSMENT — PAIN DESCRIPTION - DESCRIPTORS
DESCRIPTORS: ACHING
DESCRIPTORS: CRAMPING
DESCRIPTORS: SHARP;STABBING
DESCRIPTORS: ACHING
DESCRIPTORS: ACHING
DESCRIPTORS: CRAMPING
DESCRIPTORS: CRUSHING

## 2018-02-06 NOTE — PLAN OF CARE
Problem: Patient Care Overview  Goal: Plan of Care/Patient Progress Review  Outcome: Therapy, progress towards functional goals is fair  Pt. Amb. Independently in the room. R drain with malodorous output, irrtigated and patent. Kyle. Reg..diet, BG covered. Voiding in good amounts.+ BS, - BM and flatus.  Bowel regime and Pink Lady without results per pt. Abd. Distended and firm.  IV antibiotics transitioned to po, will start this pm. Oxy., Tyl.for pain with 1x dose of Tordol with better control of R sided drain site pain.Poss DC once he has a BM. Making arrangements t o ragini bailey with a friend. To [facility] for drain [facility], enc. Pt. Participation in cares.

## 2018-02-06 NOTE — PROGRESS NOTES
[facility], [place]    Sepsis Evaluation Progress Note    Date of Service: 02/05/2018    I was called to see Andrew Lockwood due to abnormal vital signs triggering the Sepsis SIRS screening alert. He is known to have an infection in his RLQ abscess. He had a drain placed by IR earlier today. Denies new symptoms.     Physical Exam    Vital Signs:  Temp: 101.4  F (38.6  C) Temp src: Oral BP: 132/87 Pulse: 123 Heart Rate: 112 Resp: 16 SpO2: 94 % O2 Device: None (Room air) Oxygen Delivery: 2 LPM    Lab:  Lactic Acid   Date Value Ref Range Status   01/24/2018 1.1 0.4 - 2.0 mmol/L Final     Drain output 90 cc    The patient is at baseline mental status.    The rest of their physical exam is essentially unchanged.    Assessment and Plan    The SIRS and exam findings are likely due to reactive , there is no sign of sepsis at this time.    Disposition: The patient will remain on the current unit. We will continue to monitor this patient closely.    Venu Ryan, DO

## 2018-02-06 NOTE — PLAN OF CARE
2/6/18 I saw the patient(pt)in his room for PLC drain education.Pt.had some pain medication before the appointment but still uncomfortable.Pt.was trying very hard to be attentive and did do all hands on but will need further assessment of retention of information..Pt.returned demonstrations of drain site care,anchoring the tube,emptying the bag,flushing with saline using PLC supplies on PLC model with a few reminders.Pt.was able to answer teach back with a few reminders.Pt.will continue to practice skills with nursing staff and read the written material.Pt.states he is homeless and will need to do these skills himself.He did state his friend,Yuriy,will be here this evening and will most likely at least be a back up for him.Above information discussed with the pt's nurse.See education flow sheet.  Written material given an reviewed today: Caring For Your drain,Flushing Your Drain,Hand Washing,PLC card.

## 2018-02-06 NOTE — DISCHARGE SUMMARY
"[facility]  Discharge Summary  General Surgery     Andrew Lockwood MRN# [id number]   YOB: 1967 Age: 50 year old     Date of Admission:  2/4/2018  Date of Discharge::  2/8/2018 10:33 AM  Admitting Physician:  Lam Hines MD  Discharge Physician:  Dr. Ingrid Beltran  Primary Care Physician:        Sharon Rosado          Admission Diagnoses:   Peritoneal abscess (H) [K65.1]            Discharge Diagnosis:   Peritoneal abscess         Procedures:   * No surgery found *  IR RLQ drain placement 2/5          Brief History of Illness:   Taken from Admission H&P:  \"50 year old male with history of HIV, CMB, CNS toxoplasmosis, DM2, DANISHA, now s/p lap appendectomy on 1/31/18 for gangrenous appendicitis, here with increased pain since discharge. States since discharge, pain has been increasing over RLQ. No nausea/vomiting, but does have some constipation and not able to have BM since 5 days ago. Last passed flatus 2 days ago. No fever/chills. No other complaints.\"           Hospital Course:   Pt received a RLQ abscess drain placement the morning of admission and started on IV abx. He tolerated the procedure well, was advanced to a regular diet, ambulated well, and voiding without difficulty. He had not had a BM in several days and was given several enemas and aggressive bowel regimen with success. He was given drain teaching prior to discharge and determined to have adequate understanding and demonstrated performance well. On day of discharge, he was ambulating independently, tolerating a regular diet, having regular bowel function, on oral pain and antibiotic medications, and a plan for follow-up. Disposition was initially an issue, but pt had a friend who he reported he could stay with while having the drain in place.           Day of Discharge Physical Exam:   Blood pressure 107/63, pulse 91, temperature 98.2  F (36.8  C), temperature source Oral, resp. rate 16, " "height 1.651 m (5' 5\"), weight 75.5 kg (166 lb 6.4 oz), SpO2 96 %.    Gen: AAOx3, NAD  Pulm: Non-labored breathing  Abd: Soft, appropriately tender, no guarding   Incision C/D/I   VITO drain with minimal SS output  Ext:  Warm and well-perfused         Final Pathology Result:   No pathology submitted           Discharge Instructions and Follow-Up:     Discharge Procedure Orders  Reason for your hospital stay   Order Comments: Abdominal abscess, now with drain placement     Adult [facility] Follow-up and recommended labs and tests   Order Comments: Will follow up as instructed by nurse clinician. You will be called after discharged with updates.     Please also follow-up with Interventional Radiology for drain cares.     Appointments on [facility] and/or [facility] (with [facility] or [facility] provider or service). Call [phone] if you haven't heard regarding these appointments within 7 days of discharge.     Follow Up and recommended labs and tests   Order Comments: Follow up with primary care provider, Sharon Rosado, within 7 days for hospital follow- up.  No follow up labs or test are needed.     Tubes and drains   Order Comments: You are going home with the following tubes or drains: VITO. Tube cares per hospital instructions. Flush drain with 10mL three times per day.     Discharge Instructions   Order Comments: DIET: regular diet  You may shower and get incision wet 2 days after operation. Do not submerge, soak, or scrub incision or swim until seen in follow-up.   The steri-strips over the wound will fall off on their own in a couple of days.  The stitches do not need to be removed, they will be absorbed on their own.  No lifting, pushing, or pulling greater than 10-15 pounds x 6 weeks.  Stay hydrated. Narcotics can make you constipated. Take over the counter fiber (metamucil or benefiber) and stool softeners (Miralax, docusate or senna) if becoming constipated.   Call for fever greater than 101.5, chills, " "decreased urine output, increased size of incision, red skin around incision, bleeding, shortness of breath, or other concerns.   Transition to ibuprofen or tylenol/acetaminophen for pain control. Do not take tylenol/acetaminophen and acetaminophen containing narcotic (e.g., percocet or vicodin) at the same time.  No driving while taking narcotic pain medications.  In emergencies, call 911   For other Questions or Concerns;   A.) During weekday working hours (Monday through Friday 8am to 4:30pm)   Call [phone] and ask to speak to the Surgery Nurse.   B.) At nights (after 4:30pm), on weekends, or if urgent call [phone]  and tell the  \"I would like to page the Surgery Resident on call.\"              Home Health Care:   Not needed           Discharge Disposition:   Discharged to home      Condition at discharge: Good         Consultations:   VASCULAR ACCESS CARE ADULT IP CONSULT  INTERVENTIONAL RADIOLOGY ADULT/PEDS IP CONSULT  VASCULAR ACCESS CARE ADULT IP CONSULT  PATIENT LEARNING CENTER IP CONSULT  VASCULAR ACCESS CARE ADULT IP CONSULT         Imaging Studies:     Results for orders placed or performed during the hospital encounter of 02/04/18   CT Abdomen Pelvis w Contrast     Value    Radiologist flags Increased inflammatory changes, possible abscess (Urgent)    Narrative    EXAMINATION: CT ABDOMEN PELVIS W CONTRAST, 2/4/2018 4:45 PM    TECHNIQUE:  Helical CT images from the lung bases through the  symphysis pubis were obtained  with IV contrast. Contrast dose:  iopamidol (ISOVUE-370) solution 104 mL    COMPARISON: 1/31/2018    HISTORY: abd pain; appendectomy 2 days ago.    FINDINGS:  Abdomen and pelvis: Focal fatty deposit near the falciform ligament.  Stable hepatic cysts in hepatic segment 5. Gallbladder, spleen,  pancreas, adrenal glands, and kidneys are within normal limits. The  urinary bladder is well-distended and unremarkable. Penile prosthesis.  Prominent loops of large bowel with " air-fluid levels. No dilated loops  of small bowel.    Post surgical changes of appendectomy. Increased inflammatory fatty  stranding in the right lower quadrant. New rimmed fluid collection  measuring 7.5 x 9.2 x 3.0 cm with simple fluid attenuation and air  within the collection. Intra-abdominal free air, likely postsurgical  due to recent appendectomy. No significant lymphadenopathy.    The abdominal aorta is nonaneurysmal. The major abdominal vasculature  is patent. Few scattered atherosclerotic calcifications of the  abdominal aorta and its branches.    Lung bases: Linear atelectasis in the base. New focal area of  groundglass opacities in the left lower lobe.    Bones and soft tissues: Soft tissues are unremarkable. No aggressive  appearing osseous lesions.      Impression    IMPRESSION:   1. Increased inflammatory fatty stranding in the right lower quadrant  near the previous appendectomy. New rimmed fluid collection measuring  up to 9.2 cm a simple fluid attenuation and air within the collection,  raising concern for possible developing abscess. Intra-abdominal free  air likely postsurgical.  2. Prominent loops of large bowel with air-fluid levels.  3.  New focal groundglass opacity in the left lower lobe is concerning  for infection.     [Urgent Result: Increased inflammatory changes, possible abscess]    Finding was identified on 2/4/2018 5:25 PM.     Dr. Barkley was contacted by Dr. Young at 2/4/2018 5:57 PM and verbalized  understanding of the urgent finding.     I have personally reviewed the examination and initial interpretation  and I agree with the findings.    DRE HIGGINS MD   CT Retroperitoneal Abscess Drainage    Narrative    CT-guided abdominal drain placement    HISTORY: Patient with right lower quadrant abdominal abscess status  post appendectomy.    COMPARISON: CT the abdomen/pelvis 2/4/2018.    : TIESHA Ruiz, Dr. Mahesh Flores performed the  entirety of this  procedure without assistance.    MEDICATIONS: Buffered 1% lidocaine local analgesia. Fentanyl 100 mcg  IV, Versed 2 mg IV.    Sedation: Total sedation time 20 minutes. Sedatives were administered  by the interventional radiology nurse under my direct supervision. I,  Dr. Mahesh Flores, was face-to-face with this patient for the  entirety of the listed sedation time.    Procedure/findings:    The risks, benefits, and alternatives of this procedure were discussed  with the patient who demonstrated understanding. Written and verbal  informed consent were obtained. The patient was placed in supine  position on the CT table. Preprocedural CT scan of the abdomen  identifies the target right lower quadrant abscess. A suitable  approach was selected and the overlying skin was prepped and draped in  usual sterile fashion. Buffered 1% lidocaine was applied for local  analgesia. Under CT guidance, a 5 Ukrainian, 10 cm [employer] catheter/needle  was advanced into the abscess. There is return of thin, malodorous,  purulent fluid. A 0.035 guidewire was advanced into the collection,  confirmed with CT. The tract was dilated to size over the guidewire  for placement of a 12 Ukrainian locking pigtail skater catheter.  Approximately 60 cc of purulent material was aspirated through this  drain and sent to the laboratory for the requested evaluation. The  drain was placed to gravity drainage. A three-way stopcock was applied  to allow future irrigation. A single retention suture was placed at  the skin. A sterile dressing was applied. The patient remained stable  throughout the procedure. No evidence for immediate complication.      Impression    IMPRESSION: Uncomplicated CT-guided placement of a right lower  quadrant abdominal abscess drain. 60 cc of thin purulent material was  sent for laboratory analysis/culture.    PLAN: [place] drainage. Monitor outputs. Irrigate every shift with 10  cc normal saline. Return to interventional radiology for  sinogram and  further drain management in one week.    SMOOTH DELACRUZ MD              Medications Prior to Admission:     No prescriptions prior to admission.              Discharge Medications:     Discharge Medication List as of 2/7/2018  4:25 PM      START taking these medications    Details   acetaminophen (TYLENOL) 325 MG tablet Take 2 tablets (650 mg) by mouth every 6 hours, Disp-100 tablet, R-0, E-Prescribe      !! polyethylene glycol (MIRALAX/GLYCOLAX) Packet Take 17 g by mouth 2 times daily, Disp-24 packet, R-0, E-Prescribe      sodium chloride, PF, 0.9% PF flush Flush abdominal drain three times per day as instructed., Disp-420 mL, R-0, E-PrescribeNeeds 42 - 10cc flushes for drain irrigation.      metroNIDAZOLE (FLAGYL) 500 MG tablet Take 1 tablet (500 mg) by mouth 2 times daily for 13 days, Disp-26 tablet, R-0, E-Prescribe      senna-docusate (SENOKOT-S;PERICOLACE) 8.6-50 MG per tablet Take 1 tablet by mouth 2 times daily as needed for constipation, Disp-50 tablet, R-0, E-Prescribe      ciprofloxacin (CIPRO) 500 MG tablet Take 1 tablet (500 mg) by mouth every 12 hours for 13 days, Disp-26 tablet, R-0, E-Prescribe       !! - Potential duplicate medications found. Please discuss with provider.      CONTINUE these medications which have NOT CHANGED    Details   !! polyethylene glycol (MIRALAX/GLYCOLAX) Packet Take 17 g by mouth daily as needed for constipation, Disp-10 packet, R-0, E-Prescribe      ibuprofen (ADVIL/MOTRIN) 200 MG tablet Take 1-2 tablets (200-400 mg) by mouth every 6 hours as needed for moderate pain, Disp-40 tablet, R-0, E-Prescribe      oxyCODONE IR (ROXICODONE) 5 MG tablet Take 1 tablet (5 mg) by mouth every 4 hours as needed for pain maximum 6 tablet(s) per day, Disp-15 tablet, R-0, Local Print      alum & mag hydroxide-simethicone (MAALOX ADVANCED MAX ST) 400-400-40 MG/5ML SUSP suspension Take 30 mLs by mouth every 4 hours as needed for indigestion, Disp-355 mL, R-0, Local Print      !!  blood glucose monitoring (NO BRAND SPECIFIED) meter device kit Use to test blood sugar 4 times daily or as directed.Disp-1 kit, [id number]      !! blood glucose monitoring (NO BRAND SPECIFIED) test strip 1 strip by In Vitro route 4 times daily (before meals and nightly) Use to test blood sugars 4 times daily or as directed, Disp-100 strip, R-11, E-Prescribe      glipiZIDE (GLUCOTROL) 5 MG tablet Take 0.5-1 tablets (2.5-5 mg) by mouth daily, Disp-30 tablet, R-11, E-Prescribe      gabapentin (NEURONTIN) 300 MG capsule Take 1 capsule (300 mg) by mouth 3 times daily, Disp-90 capsule, R-5, E-Prescribe      GENVOYA [phone] mg tablet Take 1 tablet by mouth daily, Disp-30 tablet, R-5, E-Prescribe      !! blood glucose monitoring (NO BRAND SPECIFIED) meter device kit Use to test blood sugar four times daily or as directed.Disp-1 kit, R-0Local Print      !! blood glucose monitoring (NO BRAND SPECIFIED) test strip Use to test blood sugars four times daily or as directed, Disp-100 each, R-5, Local Print      blood glucose (NO BRAND SPECIFIED) lancets standard Use to test blood sugar four times daily or as directed.Disp-100 Box, R-5Local Print       !! - Potential duplicate medications found. Please discuss with provider.          Pt was seen and discussed with Dr. Beltran on day of discharge    Kevin Santana MD  PGY-1 Surgery

## 2018-02-06 NOTE — PLAN OF CARE
"Problem: Patient Care Overview  Goal: Plan of Care/Patient Progress Review  Outcome: No Change  /88 (BP Location: Right arm)  Pulse 123  Temp 100.3  F (37.9  C) (Oral)  Resp 18  Ht 1.651 m (5' 5\")  Wt 76.7 kg (169 lb 1.6 oz)  SpO2 95%  BMI 28.14 kg/m2   /87 (BP Location: Right arm)  Pulse 123  Temp 101.4  F (38.6  C) (Oral)  Resp 16  Ht 1.651 m (5' 5\")  Wt 76.7 kg (169 lb 1.6 oz)  SpO2 94%  BMI 28.14 kg/m2    6158-4917:  T max 101.4 and Tachycardic. Tylenol given for fever; pt declined taking Benadryl with Tylenol. Triggered sepsis protocol. Lactate 1.7. OVSS on room air. Abdominal discomfort controlled with PRN oxycodone 10 mg x2 and Dilaudid IV x1. RLQ abdominal abscess drain placed today with purulent malodorous output. Irrigated drain w/10 ml NS per EMAR and pt care order instructions. Regular diet but pt has no appetite. Ate only 75% of cream of wheat for dinner.  at 1700 but there was no insulin pen available. Rechecked BG at 1900 when insulin pen dispensed and BG was 199; given 1 unit Novolog at this time. HS BG was 188; parameters not met for insulin.  LR infusing in PIV at 125 ml/hr. K 3.2; replaced per one time order 40 mEq Klor-Con. Pt up ad lexi and ambulates independently in halls. Pt states he had large loose BM this afternoon after enema administration. Voids spontaneously. Continue to monitor and follow POC. Plan for PLC appt at 10 AM for drain care.      "

## 2018-02-06 NOTE — PLAN OF CARE
Problem: Patient Care Overview  Goal: Plan of Care/Patient Progress Review  Outcome: No Change    Tmax 101.4, 100.4 after tylenol, 100.4 later in shift. -115.  Pain in R abdomen managed with oxycodone, tylenol, IV dilaudid 1x.  Voiding good amts, not passing gas, no BM. + bowel sounds, not distended, no nausea. Reg diet.  Drain irrigated qshift, tan/odorous drainage.  UAL.  PLC @ 1000 for drain teaching.

## 2018-02-06 NOTE — PROGRESS NOTES
Surgery Progress Note  2/6/2018     Subjective:  - Triggered sepsis protocol overnight (temp 38.6, ).  - Pain well controlled with PO pain meds. Denies N/V. + flatus, last BM yesterday PM following enema. Pt requesting another enema today.  - Ambulating well without pain or difficulty this afternoon.     Objective:  Temp:  [100.3  F (37.9  C)-101.4  F (38.6  C)] 100.5  F (38.1  C)  Pulse:  [113-123] 113  Heart Rate:  [108-124] 108  Resp:  [16-18] 16  BP: (117-145)/(82-88) 117/82  SpO2:  [94 %-95 %] 94 %  I/O last 3 completed shifts:  In: 3920 [P.O.:1100; I.V.:2800; Other:20]  Out: 1640 [Urine:1550; Drains:120]    Gen: Awake, alert, NAD   Resp: NLB on RA  Abd: Soft, less tender RLQ, less distended, no guarding or rebound.  Ext: WWP  Dressing/Incision: C/d/i     BMP  Recent Labs  Lab 02/05/18  0901 02/04/18  2345 02/04/18  1531 02/03/18 1919 01/31/18  1155   *  --  134 132* 135   POTASSIUM 3.2* 3.6 3.0* 3.2* 3.8   CHLORIDE 100  --  99 98 101   LINDA 8.3*  --  8.2* 8.1* 9.0   CO2 23  --  24 25 26   BUN 7  --  13 11 12   CR 0.66  --  0.67 0.64* 0.74   GLC 98  --  174* 200* 186*     CBC  Recent Labs  Lab 02/05/18  0901 02/04/18  1531 02/03/18 1919 01/31/18  1155   WBC 8.4 9.7 8.9 17.1*   RBC 3.43* 3.61* 3.56* 4.21*   HGB 10.9* 11.5* 11.3* 13.5   HCT 32.7* 34.0* 33.4* 40.1   MCV 95 94 94 95   MCH 31.8 31.9 31.7 32.1   MCHC 33.3 33.8 33.8 33.7   RDW 13.3 13.0 13.0 13.6    246 220 189     INR  Recent Labs  Lab 02/05/18  0901   INR 1.06      AST/ALT & Alk Phos  Recent Labs  Lab 02/04/18  1531 02/03/18 1919 01/31/18 1155 01/30/18 2039   AST 25 28 20 22   ALT 30 25 30 35   ALKPHOS 177* 148 83 106     Bili  Recent Labs   Lab Test  02/04/18   1531  02/03/18 1919 01/31/18 1155 01/30/18 2039 07/21/16   0521   BILITOTAL  0.7  1.4*  1.2  0.4   < >  0.4   DBIL   --    --    --    --    --   <0.1    < > = values in this interval not displayed.     Lipase/Amlyase  Recent Labs  Lab 02/04/18  1531  01/31/18  1155 01/30/18 2039   LIPASE 175 150 323       A/P: Andrew Lockwood is a 50 year old male with HIV, CMB, CNS toxo, T2DM, s/p lap appy (1/31) who presented with increasing abdominal pain found to have RLQ fluid collection concerning for abscess, clinically improving.     Pain: Well controlled, continue current regimen  Diet: Regular diet   - Pink lady enema today   - Transition to PO abx today Cipro/Flagyl   - Drain teaching with plan for d/c with drain, to follow-up in clinic.   - DVT ppx with SCDs and ambulation   - Continue PTA meds    D/c when has BM.     Scribed by Radha Duke MS3 for Kevin Santana MD    D/w chief resident +/- staff.    Kevin Santana MD  PGY-1 Surgery  pager [phone]  (6AM-5PM weekdays please page primary team, nights/wknds/holidays, page job code [phone])

## 2018-02-07 ENCOUNTER — TELEPHONE (OUTPATIENT)
Dept: PHARMACY | Facility: CLINIC | Age: 53
End: 2018-02-07

## 2018-02-07 LAB
ERYTHROCYTE [DISTWIDTH] IN BLOOD BY AUTOMATED COUNT: 13.1 % (ref 10–15)
GLUCOSE BLDC GLUCOMTR-MCNC: 111 MG/DL (ref 70–99)
GLUCOSE BLDC GLUCOMTR-MCNC: 135 MG/DL (ref 70–99)
HCT VFR BLD AUTO: 35.2 % (ref 40–53)
HGB BLD-MCNC: 12 G/DL (ref 13.3–17.7)
MCH RBC QN AUTO: 31.7 PG (ref 26.5–33)
MCHC RBC AUTO-ENTMCNC: 34.1 G/DL (ref 31.5–36.5)
MCV RBC AUTO: 93 FL (ref 78–100)
PLATELET # BLD AUTO: 381 10E9/L (ref 150–450)
RBC # BLD AUTO: 3.79 10E12/L (ref 4.4–5.9)
WBC # BLD AUTO: 10.7 10E9/L (ref 4–11)

## 2018-02-07 PROCEDURE — 25000132 ZZH RX MED GY IP 250 OP 250 PS 637: Performed by: STUDENT IN AN ORGANIZED HEALTH CARE EDUCATION/TRAINING PROGRAM

## 2018-02-07 PROCEDURE — 12000001 ZZH R&B MED SURG/OB UMMC

## 2018-02-07 PROCEDURE — 36415 COLL VENOUS BLD VENIPUNCTURE: CPT | Performed by: STUDENT IN AN ORGANIZED HEALTH CARE EDUCATION/TRAINING PROGRAM

## 2018-02-07 PROCEDURE — 85027 COMPLETE CBC AUTOMATED: CPT | Performed by: STUDENT IN AN ORGANIZED HEALTH CARE EDUCATION/TRAINING PROGRAM

## 2018-02-07 PROCEDURE — 25000128 H RX IP 250 OP 636: Performed by: STUDENT IN AN ORGANIZED HEALTH CARE EDUCATION/TRAINING PROGRAM

## 2018-02-07 PROCEDURE — 00000146 ZZHCL STATISTIC GLUCOSE BY METER IP

## 2018-02-07 RX ORDER — IBUPROFEN 200 MG
200 TABLET ORAL EVERY 6 HOURS PRN
Status: DISCONTINUED | OUTPATIENT
Start: 2018-02-07 | End: 2018-02-08 | Stop reason: HOSPADM

## 2018-02-07 RX ORDER — KETOROLAC TROMETHAMINE 30 MG/ML
15 INJECTION, SOLUTION INTRAMUSCULAR; INTRAVENOUS ONCE
Status: COMPLETED | OUTPATIENT
Start: 2018-02-07 | End: 2018-02-07

## 2018-02-07 RX ADMIN — GABAPENTIN 300 MG: 300 CAPSULE ORAL at 17:10

## 2018-02-07 RX ADMIN — OXYCODONE HYDROCHLORIDE 10 MG: 5 TABLET ORAL at 23:48

## 2018-02-07 RX ADMIN — GABAPENTIN 300 MG: 300 CAPSULE ORAL at 19:37

## 2018-02-07 RX ADMIN — CIPROFLOXACIN HYDROCHLORIDE 500 MG: 500 TABLET, FILM COATED ORAL at 19:37

## 2018-02-07 RX ADMIN — Medication 2.5 MG: at 09:16

## 2018-02-07 RX ADMIN — SENNOSIDES AND DOCUSATE SODIUM 1 TABLET: 8.6; 5 TABLET ORAL at 09:16

## 2018-02-07 RX ADMIN — SIMETHICONE CHEW TAB 80 MG 80 MG: 80 TABLET ORAL at 17:48

## 2018-02-07 RX ADMIN — OXYCODONE HYDROCHLORIDE 10 MG: 5 TABLET ORAL at 13:50

## 2018-02-07 RX ADMIN — POLYETHYLENE GLYCOL 3350 17 G: 17 POWDER, FOR SOLUTION ORAL at 01:20

## 2018-02-07 RX ADMIN — ELVITEGRAVIR, COBICISTAT, EMTRICITABINE, AND TENOFOVIR ALAFENAMIDE 1 TABLET: 150; 150; 200; 10 TABLET ORAL at 13:00

## 2018-02-07 RX ADMIN — KETOROLAC TROMETHAMINE 15 MG: 30 INJECTION, SOLUTION INTRAMUSCULAR at 17:46

## 2018-02-07 RX ADMIN — POLYETHYLENE GLYCOL 3350 17 G: 17 POWDER, FOR SOLUTION ORAL at 09:16

## 2018-02-07 RX ADMIN — CALCIUM CARBONATE (ANTACID) CHEW TAB 500 MG 500 MG: 500 CHEW TAB at 23:48

## 2018-02-07 RX ADMIN — OXYCODONE HYDROCHLORIDE 10 MG: 5 TABLET ORAL at 09:34

## 2018-02-07 RX ADMIN — OXYCODONE HYDROCHLORIDE 10 MG: 5 TABLET ORAL at 19:12

## 2018-02-07 RX ADMIN — ACETAMINOPHEN 650 MG: 325 TABLET, FILM COATED ORAL at 01:20

## 2018-02-07 RX ADMIN — ACETAMINOPHEN 650 MG: 325 TABLET, FILM COATED ORAL at 13:50

## 2018-02-07 RX ADMIN — METRONIDAZOLE 500 MG: 500 TABLET ORAL at 09:16

## 2018-02-07 RX ADMIN — POLYETHYLENE GLYCOL 3350 17 G: 17 POWDER, FOR SOLUTION ORAL at 19:37

## 2018-02-07 RX ADMIN — ACETAMINOPHEN 650 MG: 325 TABLET, FILM COATED ORAL at 19:37

## 2018-02-07 RX ADMIN — SIMETHICONE CHEW TAB 80 MG 80 MG: 80 TABLET ORAL at 01:22

## 2018-02-07 RX ADMIN — GABAPENTIN 300 MG: 300 CAPSULE ORAL at 09:16

## 2018-02-07 RX ADMIN — CIPROFLOXACIN HYDROCHLORIDE 500 MG: 500 TABLET, FILM COATED ORAL at 09:17

## 2018-02-07 RX ADMIN — SIMETHICONE CHEW TAB 80 MG 80 MG: 80 TABLET ORAL at 13:00

## 2018-02-07 RX ADMIN — METRONIDAZOLE 500 MG: 500 TABLET ORAL at 19:37

## 2018-02-07 RX ADMIN — SENNOSIDES AND DOCUSATE SODIUM 1 TABLET: 8.6; 5 TABLET ORAL at 19:37

## 2018-02-07 RX ADMIN — ACETAMINOPHEN 650 MG: 325 TABLET, FILM COATED ORAL at 09:16

## 2018-02-07 ASSESSMENT — PAIN DESCRIPTION - DESCRIPTORS
DESCRIPTORS: ACHING
DESCRIPTORS: ACHING;DISCOMFORT
DESCRIPTORS: SORE

## 2018-02-07 NOTE — TELEPHONE ENCOUNTER
Pt picked up 1 week of medboxes.     Follow up: 02/13/18    Lenka Mabry, [facility]  [phone]

## 2018-02-07 NOTE — PLAN OF CARE
Problem: Patient Care Overview  Goal: Plan of Care/Patient Progress Review  Outcome: Therapy, progress toward functional goals as expected  Pt. Sleepy and lethargic this am, oriented to person and situation . Low grade temp. MD notified, labs drawn and stable.  C/o pain at drain site. Oxy. And Tyl given as well as Simethicone for gas pains. Soap suds enema given mid am with good results. + BS and flatus.  Kyle. Reg. Diet, denies nausea. Pt. Comfortable with drain cares. Amb. W/ SBA in the cardenas . R drain with milky/purelent drainage in sm. Amounts. Plans being made for DC poss later if pt. Can identify a destination once released.

## 2018-02-07 NOTE — PROGRESS NOTES
"Social Work Services Progress Note    Hospital Day: 4  Date of Initial Social Work Evaluation:  Not able to be completed due to being a brief visit and pt rushed  Collaborated with:  Pt, General Surgery team, Unit Natasha Suarez, bedside nurse    Data:  Pt with a long standing history of homelessness and was admitted from a friend's house with complications after a lap appy on 1/31/18.   Per chart review pt was planning to stay with his friend again after d/c but per unit natasha is now reporting he doesn't have a d/c location to go to. Per unit sw pt has a drain but has learned the cares for this and does not have any TCU need.   Pt is very well connected in the community and has a  with [facility] and is connected with a clinic SW Simon Cadena.     Intervention:  Natasha met with pt in pt's room and pt was not there upon arrival but then walked into the room and appeared in good spirits and brightly said \"eddie!\" Pt reported that the friend he planned to stay with after d/c hasn't called him back to confirm that he can return there. Pt reported he knows how to get in a shelter if needed and has the phone number to call for this but has concerns that there won't be a bed open and it's cold outside. Pt reported being in pain and described it as a 10/10 currently and wants to stay another day to be more \"well\" and not be in such pain. Sw explained that sw will speak with his doctors but explained that pt should plan for a d/c today and start the shelter seeking process.     Pt asked if writer can connect pt with a specific landlord and natasha explained that this is not something writer can assist with and directed pt to his [facility] case manger to assist with housing (as per chart review they are already assisting pt with this).     Natasha spoke with General Surgery team regarding pt's reports of extreme pain. Team reported pt's pain is out of proportion to what it should be and still feel that pt is ready to d/c. Team will meet " "with pt within the hour to discuss this.     Sw returned to pt's room and informed him of team reporting he is medically ready to d/c. Pt reported he called the shelter connect phone number and that there are no shelter beds available. Sw offered to initiate call to [facility] medical respite bed but pt declined this reporting he does not want to do that. Pt reported he feels capable of managing his drain cares. Pt reported having told staff he wanted to be discharged by 9am if possible otherwise by 3pm so he could try and get to a shelter and is aware that the hospital is not a hotel. Pt reported \"lady get me my papers and I'll leave\". Pt reported he does not know where he will go [place]per [place]). Pt reported having another friend he could maybe stay with but would have had to call that person prior to 3pm and refused to call this person with sw present. Pt again said he wanted his d/c papers and to leave and that sw cannot help him.     Dewayne spoke with pt's bedside nurse Nereyda who reported pt needs to change the dressing around his tube and flush it with saline every 8 hours and can send pt with the supplies to do these cares wherever pt ends up and pt doesn't need any special facilities to do these cares. Nereyda reported that pt had told her he wanted to be d/c'd by 3pm today and would figure out where to go.   eDwayne informed General Surgery team (Dr. Santana) of pt's d/c plan to unknown location and that bedside nurse will send pt with needed supplies for drain cares and team is agreeable to this d/c plan. Team would like to know how pt can be reached for check in after d/c.     Dewayne called pt's room and pt reported he does not have a phone number and does not have any phone to be contacted on and there will not be anyone with him who can be contacted on his behalf. Pt reported that email is his preferred method of contact and that he checks his email at the library. The listed " email address for pt is [email or web address].     Two bus tokens were left with bedside nurse who reported she will have them at the desk for pt for d/c transportation.       Assessment:  See bedside RN, medical team notes    Plan:    Anticipated Disposition:  Unknown location as pt is unable to secure a shelter bed    Barriers to d/c plan:  NA    Follow Up:  TBD, sw will continue to follow    CRISTAL Valera, MSW  7B  assisting on 7C  [phone] (pager) [phone]  2/7/2018

## 2018-02-07 NOTE — PLAN OF CARE
Problem: Patient Care Overview  Goal: Plan of Care/Patient Progress Review  Outcome: No Change  VSS. Pain is being managed with hot packs, Tylenol and Oxycodone. Abdomen is distended. -PG/-BM. Bowel medication given. Up SBA after falling in his room while attempting to put on a shoe while leaning against the wall. MD paged. No additional interventions needed. Home medications from [facility] sent down to security. Abdominal drain is place with white/milky/tan discharge. Irrigate q8h. Education provided at [facility] today. Pt refusing insulin administration. Insulin dc'd and home Glipizide added to MAR. PIV in place- saline locked. Continue POC.    Pt would LIKE to be awoken for bedside shift report.

## 2018-02-07 NOTE — PROGRESS NOTES
"2005----Nursing assistant found the patient on the floor upon hearing him yell from the room. Pt was attempting to put on his shoe while leaning against the wall when he lost his balance and fell to the floor. A walker and 2 assist were used to get the patient back into bed. Pt states \"it was a complete accident.\" RN completed an assessment. VSS. No head contact with furniture or the floor. MD paged. No new orders placed. RN informed the patient that we would like to assist him with any tasks he feels unstable or uncomfortable with. Education on fall prevention was given and stressed.  "

## 2018-02-07 NOTE — PLAN OF CARE
Problem: Patient Care Overview  Goal: Plan of Care/Patient Progress Review  Outcome: No Change    Pain managed with oxycodone, tylenol, simethicone. Has not had oxycodone overnight, was able to sleep from 0200-end of shift.  Walked in halls trying to get bowels moving. Agreed to take Miralax at 0100, still passing gas but no BM. Abdomen distended, somewhat soft.   Voiding spont, good amts.  Started Glypizide yesterday, BG at 0130 was 111.  Monitor KIMMIE

## 2018-02-08 VITALS
HEART RATE: 91 BPM | SYSTOLIC BLOOD PRESSURE: 107 MMHG | BODY MASS INDEX: 27.72 KG/M2 | HEIGHT: 65 IN | DIASTOLIC BLOOD PRESSURE: 63 MMHG | RESPIRATION RATE: 16 BRPM | TEMPERATURE: 98.2 F | WEIGHT: 166.4 LBS | OXYGEN SATURATION: 96 %

## 2018-02-08 LAB
BACTERIA SPEC CULT: ABNORMAL
GLUCOSE BLDC GLUCOMTR-MCNC: 121 MG/DL (ref 70–99)
SPECIMEN SOURCE: ABNORMAL

## 2018-02-08 PROCEDURE — 00000146 ZZHCL STATISTIC GLUCOSE BY METER IP

## 2018-02-08 PROCEDURE — 25000132 ZZH RX MED GY IP 250 OP 250 PS 637: Performed by: STUDENT IN AN ORGANIZED HEALTH CARE EDUCATION/TRAINING PROGRAM

## 2018-02-08 RX ORDER — OXYCODONE HYDROCHLORIDE 5 MG/1
5-10 TABLET ORAL EVERY 4 HOURS PRN
Qty: 10 TABLET | Refills: 0 | Status: SHIPPED | OUTPATIENT
Start: 2018-02-08 | End: 2018-02-22

## 2018-02-08 RX ORDER — CEFPODOXIME PROXETIL 200 MG/1
200 TABLET, FILM COATED ORAL 2 TIMES DAILY
Qty: 28 TABLET | Refills: 0 | Status: ON HOLD | OUTPATIENT
Start: 2018-02-08 | End: 2018-02-12

## 2018-02-08 RX ADMIN — ACETAMINOPHEN 650 MG: 325 TABLET, FILM COATED ORAL at 08:32

## 2018-02-08 RX ADMIN — CIPROFLOXACIN HYDROCHLORIDE 500 MG: 500 TABLET, FILM COATED ORAL at 08:31

## 2018-02-08 RX ADMIN — GABAPENTIN 300 MG: 300 CAPSULE ORAL at 08:32

## 2018-02-08 RX ADMIN — ACETAMINOPHEN 650 MG: 325 TABLET, FILM COATED ORAL at 03:52

## 2018-02-08 RX ADMIN — ELVITEGRAVIR, COBICISTAT, EMTRICITABINE, AND TENOFOVIR ALAFENAMIDE 1 TABLET: 150; 150; 200; 10 TABLET ORAL at 08:32

## 2018-02-08 RX ADMIN — POLYETHYLENE GLYCOL 3350 17 G: 17 POWDER, FOR SOLUTION ORAL at 08:31

## 2018-02-08 RX ADMIN — OXYCODONE HYDROCHLORIDE 10 MG: 5 TABLET ORAL at 08:34

## 2018-02-08 RX ADMIN — SENNOSIDES AND DOCUSATE SODIUM 1 TABLET: 8.6; 5 TABLET ORAL at 08:32

## 2018-02-08 RX ADMIN — METRONIDAZOLE 500 MG: 500 TABLET ORAL at 08:31

## 2018-02-08 RX ADMIN — Medication 5 MG: at 08:33

## 2018-02-08 RX ADMIN — OXYCODONE HYDROCHLORIDE 10 MG: 5 TABLET ORAL at 03:49

## 2018-02-08 ASSESSMENT — PAIN DESCRIPTION - DESCRIPTORS
DESCRIPTORS: ACHING;SORE
DESCRIPTORS: ACHING;DISCOMFORT

## 2018-02-08 NOTE — PLAN OF CARE
Problem: Patient Care Overview  Goal: Plan of Care/Patient Progress Review  Outcome: Improving  AVSS, afebrile. Slightly tachycardiac. Pt c/o consistent abdominal pain, discharge today d/c, staying over night for pain management. Given IV toradol and oxycodone with post relief. Denies nausea/vomiting. Up ad lexi, good oral intake, +BS, adequate UO. PIV SL. Milky/purulent drainage in right abdomen drain. Continue with care.

## 2018-02-08 NOTE — PLAN OF CARE
Problem: Patient Care Overview  Goal: Plan of Care/Patient Progress Review  Outcome: Adequate for Discharge Date Met: 02/08/18  Discharge  D: Orders for discharge and outpatient medications written.   I: Belongings gathered from security and returned to the patient. Bus coins provided via . Home medications and return to clinic schedule reviewed with patient. Abdominal drain irrigation teaching provided, patient able to demonstrate ability to flush. Dressing changes education reviewed. Discharge instructions and parameters for calling Health Care Provider reviewed with teach back. Patient left at 10:30AM accompanied by a friend.   A: Patient verbalized understanding and was ready for discharge.   P: Patient instructed to  medications in Pharmacy. Follow up as scheduled.

## 2018-02-08 NOTE — PROGRESS NOTES
"02/08/2018  General Surgery Cross Cover Note    Was called to bedside to evaluate patient Andrew Lockwood for complaints of blurry vision. Patient was seen resting comfortably watching television. He states the blurred vision began earlier this evening. He has to use glasses in order to see adequately. Denies nausea, vomiting, weakness, paresthesias.     Exam:   /76  Pulse 100  Temp 98.8  F (37.1  C) (Oral)  Resp 16  Ht 1.651 m (5' 5\")  Wt 75.5 kg (166 lb 6.4 oz)  SpO2 96%  BMI 27.69 kg/m2  General: Alert, interactive, & in NAD  Neuro: CN 2-12 grossly intact, no lateralizing signs, moves all extremities spontaneously. Good  strength bilaterally.  Resp: Nonlabored breathing on RA  Cardiac: Regular rate; extremities warm;   Abdomen: Soft, nontender, nondistended.  Incision: c/d/i without erythema, warmth, or discharge.  Extremities: No LE edema or obvious joint abnormalities   Skin: Warm and dry, no jaundice or rash    I/O last 3 completed shifts:  In: 1510 [P.O.:1490; Other:20]  Out: 35 [Drains:45]    Assessment:  Andrew Lockwood is a 50 year old male s/p lap appy on 1/31 with new complaints of blurred vision. No acute loss of vision and patients neuro exam was benign at this time. Could possibly be related to antibiotic therapy    Plan:  -Will update the day team for definitive workup    Venu Ryan DO   General Surgery PGY1  [phone]  (After 6AM please page primary team)  "

## 2018-02-08 NOTE — PLAN OF CARE
Problem: Patient Care Overview  Goal: Plan of Care/Patient Progress Review  Outcome: Improving  AVSS. Pt reported a new on set of blurry vision, neuros intact and VSS at that time. Pt was otherwise asymptomatic, notified cross cover MD Jun Ryan. No new orders obtained, continue to monitor. Pt reports abdominal pain, given 10 mg of oxycodone q4hrs and scheduled tylenol with relief. Denies nausea, on a regular diet. Passing flatus but no BM overnight. Pt requested an enema but later declined one. Voiding spontaneously. Abscess drain with milky tan output, irrigated per orders. On PO antibiotics. Abdominal incisions are c/d/i. Up ab lexi. Ambulating in halls. Friend at bedside throughout the night. Plan for d/c today.

## 2018-02-09 ENCOUNTER — CARE COORDINATION (OUTPATIENT)
Dept: SURGERY | Facility: CLINIC | Age: 53
End: 2018-02-09

## 2018-02-09 ENCOUNTER — TELEPHONE (OUTPATIENT)
Dept: FAMILY MEDICINE | Facility: CLINIC | Age: 53
End: 2018-02-09

## 2018-02-09 NOTE — TELEPHONE ENCOUNTER
This patient was discharged from [facility] on 02/08/2018.    Discharge Diagnosis: abdominal abscess, peritoneal abscess    Follow-up instructions:    Follow up with primary care provider, Sharon Rosado, within 7 days for hospital follow- up.  No follow up labs or test are needed.         A follow-up visit has not been scheduled.      Number of ED/ER visits in the last 12 months:  23     Please follow-up with patient.    Brandy Espinosa

## 2018-02-09 NOTE — TELEPHONE ENCOUNTER
See Care Coordinator encounter from [facility] as the patient is being following by physicians in that care system.      Silas Matthews, MSN, RN, PHN  [facility] Care Coordinator  [facility]  Phone: [phone]  [email or web address]

## 2018-02-09 NOTE — PROGRESS NOTES
Andrew Lockwood is a patient that was discharged from the hospital yesterday with an IR drain in place due to an abscess.  DC Summary reviewed and patient does not have a telephone so unable to reach him for post discharge call.  Per  note dated 2/7, patient prefers to be contacted via email [email or web address] - he checks mail at the library.    Of note:  IR drain placed:  Flush with 10 ml NS TID and record output.  IR to contact patient for repeat sinogram in one week.  Message sent to FERNANDO Castro regarding helping arrange visit.  Patient was discharged with supplies.  Post op appointment arranged with Dr. Holt 2/21/18 at 10 AM [facility] Clinic ([address] [facility], 1st Floor, Endoscopy area)  New Medications:  Flagyl, Vantin, Oxycodone, Tylenol, Senna, Miralax.    Email sent to patient with clinic contact information and appointment reminder (Dr. Holt).

## 2018-02-10 ENCOUNTER — HOSPITAL ENCOUNTER (INPATIENT)
Facility: CLINIC | Age: 53
LOS: 2 days | Discharge: HOME | DRG: 862 | End: 2018-02-13
Attending: EMERGENCY MEDICINE | Admitting: SURGERY
Payer: COMMERCIAL

## 2018-02-10 ENCOUNTER — APPOINTMENT (OUTPATIENT)
Dept: CT IMAGING | Facility: CLINIC | Age: 53
DRG: 862 | End: 2018-02-10
Attending: EMERGENCY MEDICINE
Payer: COMMERCIAL

## 2018-02-10 LAB
ALBUMIN SERPL-MCNC: 2.7 G/DL (ref 3.4–5)
ALP SERPL-CCNC: 148 U/L (ref 40–150)
ALT SERPL W P-5'-P-CCNC: 21 U/L (ref 0–70)
ANION GAP SERPL CALCULATED.3IONS-SCNC: 7 MMOL/L (ref 3–14)
AST SERPL W P-5'-P-CCNC: 16 U/L (ref 0–45)
BASOPHILS # BLD AUTO: 0.1 10E9/L (ref 0–0.2)
BASOPHILS NFR BLD AUTO: 0.9 %
BILIRUB SERPL-MCNC: 0.2 MG/DL (ref 0.2–1.3)
BUN SERPL-MCNC: 12 MG/DL (ref 7–30)
CALCIUM SERPL-MCNC: 8.5 MG/DL (ref 8.5–10.1)
CHLORIDE SERPL-SCNC: 107 MMOL/L (ref 94–109)
CO2 SERPL-SCNC: 24 MMOL/L (ref 20–32)
CREAT SERPL-MCNC: 0.62 MG/DL (ref 0.66–1.25)
DIFFERENTIAL METHOD BLD: ABNORMAL
EOSINOPHIL # BLD AUTO: 0.3 10E9/L (ref 0–0.7)
EOSINOPHIL NFR BLD AUTO: 4.3 %
ERYTHROCYTE [DISTWIDTH] IN BLOOD BY AUTOMATED COUNT: 13.5 % (ref 10–15)
GFR SERPL CREATININE-BSD FRML MDRD: >90 ML/MIN/1.7M2
GLUCOSE BLDC GLUCOMTR-MCNC: 130 MG/DL (ref 70–99)
GLUCOSE BLDC GLUCOMTR-MCNC: 162 MG/DL (ref 70–99)
GLUCOSE SERPL-MCNC: 173 MG/DL (ref 70–99)
HCT VFR BLD AUTO: 34.6 % (ref 40–53)
HGB BLD-MCNC: 11.4 G/DL (ref 13.3–17.7)
LIPASE SERPL-CCNC: 343 U/L (ref 73–393)
LYMPHOCYTES # BLD AUTO: 1.5 10E9/L (ref 0.8–5.3)
LYMPHOCYTES NFR BLD AUTO: 20.9 %
MCH RBC QN AUTO: 31.2 PG (ref 26.5–33)
MCHC RBC AUTO-ENTMCNC: 32.9 G/DL (ref 31.5–36.5)
MCV RBC AUTO: 95 FL (ref 78–100)
METAMYELOCYTES # BLD: 0.1 10E9/L
METAMYELOCYTES NFR BLD MANUAL: 0.9 %
MONOCYTES # BLD AUTO: 0.4 10E9/L (ref 0–1.3)
MONOCYTES NFR BLD AUTO: 6.1 %
MYELOCYTES # BLD: 0.1 10E9/L
MYELOCYTES NFR BLD MANUAL: 1.7 %
NEUTROPHILS # BLD AUTO: 4.6 10E9/L (ref 1.6–8.3)
NEUTROPHILS NFR BLD AUTO: 62.6 %
PLATELET # BLD AUTO: 501 10E9/L (ref 150–450)
PLATELET # BLD EST: ABNORMAL 10*3/UL
POTASSIUM SERPL-SCNC: 3.5 MMOL/L (ref 3.4–5.3)
PROMYELOCYTES # BLD MANUAL: 0.2 10E9/L
PROMYELOCYTES NFR BLD MANUAL: 2.6 %
PROT SERPL-MCNC: 6.8 G/DL (ref 6.8–8.8)
RBC # BLD AUTO: 3.65 10E12/L (ref 4.4–5.9)
RBC MORPH BLD: NORMAL
SODIUM SERPL-SCNC: 138 MMOL/L (ref 133–144)
WBC # BLD AUTO: 7.3 10E9/L (ref 4–11)

## 2018-02-10 PROCEDURE — 25000132 ZZH RX MED GY IP 250 OP 250 PS 637: Performed by: EMERGENCY MEDICINE

## 2018-02-10 PROCEDURE — 96376 TX/PRO/DX INJ SAME DRUG ADON: CPT | Performed by: EMERGENCY MEDICINE

## 2018-02-10 PROCEDURE — 74176 CT ABD & PELVIS W/O CONTRAST: CPT

## 2018-02-10 PROCEDURE — 99285 EMERGENCY DEPT VISIT HI MDM: CPT | Mod: Z6 | Performed by: EMERGENCY MEDICINE

## 2018-02-10 PROCEDURE — 25000125 ZZHC RX 250: Performed by: EMERGENCY MEDICINE

## 2018-02-10 PROCEDURE — 96361 HYDRATE IV INFUSION ADD-ON: CPT | Performed by: EMERGENCY MEDICINE

## 2018-02-10 PROCEDURE — 96365 THER/PROPH/DIAG IV INF INIT: CPT | Performed by: EMERGENCY MEDICINE

## 2018-02-10 PROCEDURE — 99285 EMERGENCY DEPT VISIT HI MDM: CPT | Mod: 25 | Performed by: EMERGENCY MEDICINE

## 2018-02-10 PROCEDURE — 25000128 H RX IP 250 OP 636: Performed by: EMERGENCY MEDICINE

## 2018-02-10 PROCEDURE — 80053 COMPREHEN METABOLIC PANEL: CPT | Performed by: EMERGENCY MEDICINE

## 2018-02-10 PROCEDURE — 83690 ASSAY OF LIPASE: CPT | Performed by: EMERGENCY MEDICINE

## 2018-02-10 PROCEDURE — 85025 COMPLETE CBC W/AUTO DIFF WBC: CPT | Performed by: EMERGENCY MEDICINE

## 2018-02-10 PROCEDURE — 00000146 ZZHCL STATISTIC GLUCOSE BY METER IP

## 2018-02-10 PROCEDURE — 96375 TX/PRO/DX INJ NEW DRUG ADDON: CPT | Performed by: EMERGENCY MEDICINE

## 2018-02-10 RX ORDER — ONDANSETRON 2 MG/ML
4 INJECTION INTRAMUSCULAR; INTRAVENOUS EVERY 30 MIN PRN
Status: DISCONTINUED | OUTPATIENT
Start: 2018-02-10 | End: 2018-02-11

## 2018-02-10 RX ORDER — HYDROMORPHONE HYDROCHLORIDE 1 MG/ML
0.5 INJECTION, SOLUTION INTRAMUSCULAR; INTRAVENOUS; SUBCUTANEOUS
Status: COMPLETED | OUTPATIENT
Start: 2018-02-10 | End: 2018-02-10

## 2018-02-10 RX ORDER — CEFPODOXIME PROXETIL 200 MG/1
200 TABLET, FILM COATED ORAL 2 TIMES DAILY
Status: DISCONTINUED | OUTPATIENT
Start: 2018-02-10 | End: 2018-02-11

## 2018-02-10 RX ORDER — HYDROMORPHONE HYDROCHLORIDE 1 MG/ML
0.5 INJECTION, SOLUTION INTRAMUSCULAR; INTRAVENOUS; SUBCUTANEOUS ONCE
Status: COMPLETED | OUTPATIENT
Start: 2018-02-10 | End: 2018-02-10

## 2018-02-10 RX ORDER — SODIUM CHLORIDE 9 MG/ML
1000 INJECTION, SOLUTION INTRAVENOUS CONTINUOUS
Status: DISCONTINUED | OUTPATIENT
Start: 2018-02-10 | End: 2018-02-11

## 2018-02-10 RX ADMIN — METRONIDAZOLE 500 MG: 500 INJECTION, SOLUTION INTRAVENOUS at 22:59

## 2018-02-10 RX ADMIN — SODIUM CHLORIDE 1000 ML: 9 INJECTION, SOLUTION INTRAVENOUS at 19:20

## 2018-02-10 RX ADMIN — Medication 0.5 MG: at 19:20

## 2018-02-10 RX ADMIN — SODIUM CHLORIDE 1000 ML: 9 INJECTION, SOLUTION INTRAVENOUS at 16:51

## 2018-02-10 RX ADMIN — CEFPODOXIME PROXETIL 200 MG: 200 TABLET, FILM COATED ORAL at 22:58

## 2018-02-10 RX ADMIN — Medication 0.5 MG: at 22:08

## 2018-02-10 ASSESSMENT — ENCOUNTER SYMPTOMS
VOMITING: 0
BLOOD IN STOOL: 0
ABDOMINAL PAIN: 1
FEVER: 0
NAUSEA: 1

## 2018-02-10 NOTE — ED PROVIDER NOTES
History     Chief Complaint   Patient presents with     Abdominal Pain     HPI  Andrew Lockwood is a 50 year old homeless [place] with history of appendectomy 1/31 for gangrenous appendicitis, AIDS, and CNX toxoplasmosis among others presents the ED with right lower quadrant abdominal pain.  Per review of his chart, patient was recently admitted from 2/4-2/8/18 for peritoneal abscess.  He received RLQ abscess drain placement and discharged with Cipro and Flagyl.  Patient states this morning he woke up at 5 AM started to have right lower quadrant abdominal pain.  He states yesterday he felt fine.  He has also noted a significant amount of drainage from his drain today but states yesterday there was little output.  He states the drainage has also been cloudy.   He reports that he does not have the supplies to change his dressings and has not changed from since being discharged from the hospital. He also has complaints of nausea.  He reports his last bowel movement was approximately half an hour ago and  denies any blood in it.  He otherwise currently denies any vomiting or fevers.    PAST MEDICAL HISTORY  Past Medical History:   Diagnosis Date     AIDS (H)      Allergic rhinitis due to other allergen     DNS     Chronic abdominal pain      CNS toxoplasmosis (H)      Diabetes type 2, controlled (H)      GERD (gastroesophageal reflux disease)      HIV (human immunodeficiency virus infection) (H)      Periungual wart      Sleep apnea     doesn't use CPAP     PAST SURGICAL HISTORY  Past Surgical History:   Procedure Laterality Date     C NONSPECIFIC PROCEDURE      right forearm fracture     COLONOSCOPY Left 1/22/2016    Procedure: COMBINED COLONOSCOPY, SINGLE OR MULTIPLE BIOPSY/POLYPECTOMY BY BIOPSY;  Surgeon: Clark Saini MD;  Location: [facility]     HC EXPLORE UNDESC TESTIS,INGUIN/SCROTAL       LAPAROSCOPIC APPENDECTOMY N/A 1/31/2018    Procedure: LAPAROSCOPIC APPENDECTOMY;  LAPAROSCOPIC  APPENDECTOMY;  Surgeon: Dawn Holt MD;  [facility]     LAPAROSCOPY DIAGNOSTIC (GENERAL) N/A 7/26/2016    Procedure: LAPAROSCOPY DIAGNOSTIC (GENERAL);  Surgeon: Susannah Arriaga MD;  [facility]     OPTICAL TRACKING SYSTEM CRANIOTOMY, EXCISE TUMOR, COMBINED Left 4/10/2015    Procedure: COMBINED OPTICAL TRACKING SYSTEM CRANIOTOMY, EXCISE TUMOR;  Surgeon: Mirlande Colmenares MD;  Location: [facility]     REPAIR GAMEKEEPER'S THUMB Right 12/2/2016    Procedure: REPAIR LIGAMENT ULNAR COLLATERAL THUMB (GAMEKEEPER'S);  Surgeon: Evin Zamorano MD;  Location: [facility]     FAMILY HISTORY  Family History   Problem Relation Age of Onset     DIABETES Brother      DIABETES Father      Alzheimer Disease Father      Unknown/Adopted Mother      DIABETES Paternal Grandfather      CANCER No family hx of      no skin cancer     Skin Cancer No family hx of      no famiy hx of skin cancer     SOCIAL HISTORY  Social History   Substance Use Topics     Smoking status: Current Every Day Smoker     Packs/day: 0.20     Last attempt to quit: 10/28/2016     Smokeless tobacco: Former User      Comment: 4 cigarettes     Alcohol use No      Comment: Last etoh in 2007     MEDICATIONS  Current Facility-Administered Medications   Medication     alum & mag hydroxide-simethicone (MYLANTA ES/MAALOX  ES) suspension 30 mL     cefOXitin (MEFOXIN) 2 g in 10 mL SWFI for IVP     metroNIDAZOLE (FLAGYL) infusion 500 mg     ibuprofen (ADVIL/MOTRIN) tablet 200-400 mg     glipiZIDE (GLUCOTROL) half-tab 2.5-5 mg     GENVOYA 400-269-159-10 mg tablet     gabapentin (NEURONTIN) capsule 300 mg     oxyCODONE IR (ROXICODONE) tablet 5-10 mg     polyethylene glycol (MIRALAX/GLYCOLAX) Packet 17 g     senna-docusate (SENOKOT-S;PERICOLACE) 8.6-50 MG per tablet 1 tablet     acetaminophen (TYLENOL) tablet 650 mg     acetaminophen (TYLENOL) Suppository 650 mg     naloxone (NARCAN) injection 0.1-0.4 mg     melatonin tablet 1 mg     ondansetron  (ZOFRAN-ODT) ODT tab 4 mg    Or     ondansetron (ZOFRAN) injection 4 mg     dextrose 5% and 0.45% NaCl + KCl 20 mEq/L infusion     glucose 40 % gel 15-30 g    Or     dextrose 50 % injection 25-50 mL    Or     glucagon injection 1 mg     ALLERGIES  Allergies   Allergen Reactions     Metformin      Abdominal pain     Milk [Lac Bovis] Hives     Tylenol [Acetaminophen] Itching       I have reviewed the Medications, Allergies, Past Medical and Surgical History, and Social History in the Epic system.    Review of Systems   Constitutional: Negative for fever.   Gastrointestinal: Positive for abdominal pain (RLQ) and nausea. Negative for blood in stool and vomiting.   All other systems reviewed and are negative.      Physical Exam   BP: 109/69  Pulse: 92  Heart Rate: 83  Temp: 98.4  F (36.9  C)  Resp: 16  Weight: 76.9 kg (169 lb 8.5 oz)  SpO2: 98 %      Physical Exam   Constitutional: No distress.   HENT:   Head: Atraumatic.   Mouth/Throat: Oropharynx is clear and moist. No oropharyngeal exudate.   Eyes: Pupils are equal, round, and reactive to light. No scleral icterus.   Cardiovascular: Normal heart sounds and intact distal pulses.    Pulmonary/Chest: Breath sounds normal. No respiratory distress.   Abdominal: Soft. Bowel sounds are normal. There is tenderness in the right lower quadrant.       Musculoskeletal: He exhibits no edema or tenderness.   Skin: Skin is warm. No rash noted. He is not diaphoretic.       ED Course     ED Course     Procedures             Critical Care time:  none             Labs Ordered and Resulted from Time of ED Arrival Up to the Time of Departure from the ED   GLUCOSE BY METER - Abnormal; Notable for the following:        Result Value    Glucose 162 (*)     All other components within normal limits   CBC WITH PLATELETS DIFFERENTIAL - Abnormal; Notable for the following:     RBC Count 3.65 (*)     Hemoglobin 11.4 (*)     Hematocrit 34.6 (*)     Platelet Count 501 (*)     Absolute Metamyelocytes  0.1 (*)     Absolute Myelocytes 0.1 (*)     Absolute Promyeloctyes 0.2 (*)     All other components within normal limits   COMPREHENSIVE METABOLIC PANEL - Abnormal; Notable for the following:     Glucose 173 (*)     Creatinine 0.62 (*)     Albumin 2.7 (*)     All other components within normal limits   GLUCOSE BY METER - Abnormal; Notable for the following:     Glucose 130 (*)     All other components within normal limits   LIPASE   GLUCOSE MONITOR NURSING POCT   PERIPHERAL IV CATHETER       Consults  Surgery: Called (02/10/18 0315)    Assessments & Plan (with Medical Decision Making)   The patient has worsening abdominal pain since yesterday.  He has a drain in place and states that the discharge is more purulent.  He is homeless and [place] train and it is not clear if he is taking his antibiotics as he says he takes 2 different antibiotics once a day.  The patient's CT scan does not show a new collection however he has worsening pain and a very bad social situation to ensure that he improves.  He will be admitted to the surgery service for observation and serial exams tonight.  He was given his antibiotics and has mild relief after IV Dilaudid for pain.    I have reviewed the nursing notes.    I have reviewed the findings, diagnosis, plan and need for follow up with the patient.    Current Discharge Medication List          Final diagnoses:   Abdominal abscess (H)     IMinh, am serving as a trained medical scribe to document services personally performed by Matt Elmore MD, based on the provider's statements to me.      Matt KELLY MD, was physically present and have reviewed and verified the accuracy of this note documented by Minh Boss.     2/10/2018   [facility], EMERGENCY DEPARTMENT     Matt Elmore MD  02/11/18 0224

## 2018-02-10 NOTE — IP AVS SNAPSHOT
Unit [address]D Observation [address] [address]    Phone:  [phone]    Fax:  [phone]                                       After Visit Summary   2/10/2018    Andrew Lockwood    MRN: [id number]           After Visit Summary Signature Page     I have received my discharge instructions, and my questions have been answered. I have discussed any challenges I see with this plan with the nurse or doctor.    ..........................................................................................................................................  Patient/Patient Representative Signature      ..........................................................................................................................................  Patient Representative Print Name and Relationship to Patient    ..................................................               ................................................  Date                                            Time    ..........................................................................................................................................  Reviewed by Signature/Title    ...................................................              ..............................................  Date                                                            Time

## 2018-02-10 NOTE — ED NOTES
"Pt presents ambulatory to triage from streets as Pt is homeless. Pt states has surgery on abd 2 weeks ago and had drain placed. Pt unsure what surgery was states \" I think my appendix\". Pt states pain has been worse the past few Pt states has not check BG in \"awhile\".  Pt denies drinking etoh.   "

## 2018-02-11 ENCOUNTER — APPOINTMENT (OUTPATIENT)
Dept: GENERAL RADIOLOGY | Facility: CLINIC | Age: 53
DRG: 862 | End: 2018-02-11
Payer: COMMERCIAL

## 2018-02-11 PROBLEM — K37 APPENDICITIS: Status: ACTIVE | Noted: 2018-02-11

## 2018-02-11 PROBLEM — R10.9 ABDOMINAL PAIN: Status: ACTIVE | Noted: 2017-11-07

## 2018-02-11 LAB
ANION GAP SERPL CALCULATED.3IONS-SCNC: 8 MMOL/L (ref 3–14)
ANION GAP SERPL CALCULATED.3IONS-SCNC: 9 MMOL/L (ref 3–14)
BUN SERPL-MCNC: 7 MG/DL (ref 7–30)
BUN SERPL-MCNC: 8 MG/DL (ref 7–30)
CALCIUM SERPL-MCNC: 7.8 MG/DL (ref 8.5–10.1)
CALCIUM SERPL-MCNC: 8.4 MG/DL (ref 8.5–10.1)
CHLORIDE SERPL-SCNC: 109 MMOL/L (ref 94–109)
CHLORIDE SERPL-SCNC: 110 MMOL/L (ref 94–109)
CO2 SERPL-SCNC: 22 MMOL/L (ref 20–32)
CO2 SERPL-SCNC: 22 MMOL/L (ref 20–32)
CREAT SERPL-MCNC: 0.64 MG/DL (ref 0.66–1.25)
CREAT SERPL-MCNC: 0.68 MG/DL (ref 0.66–1.25)
ERYTHROCYTE [DISTWIDTH] IN BLOOD BY AUTOMATED COUNT: 13.5 % (ref 10–15)
ERYTHROCYTE [DISTWIDTH] IN BLOOD BY AUTOMATED COUNT: 13.6 % (ref 10–15)
GFR SERPL CREATININE-BSD FRML MDRD: >90 ML/MIN/1.7M2
GFR SERPL CREATININE-BSD FRML MDRD: >90 ML/MIN/1.7M2
GLUCOSE BLDC GLUCOMTR-MCNC: 126 MG/DL (ref 70–99)
GLUCOSE BLDC GLUCOMTR-MCNC: 141 MG/DL (ref 70–99)
GLUCOSE BLDC GLUCOMTR-MCNC: 165 MG/DL (ref 70–99)
GLUCOSE BLDC GLUCOMTR-MCNC: 223 MG/DL (ref 70–99)
GLUCOSE BLDC GLUCOMTR-MCNC: 265 MG/DL (ref 70–99)
GLUCOSE SERPL-MCNC: 121 MG/DL (ref 70–99)
GLUCOSE SERPL-MCNC: 198 MG/DL (ref 70–99)
HCT VFR BLD AUTO: 33.6 % (ref 40–53)
HCT VFR BLD AUTO: 34.9 % (ref 40–53)
HGB BLD-MCNC: 10.9 G/DL (ref 13.3–17.7)
HGB BLD-MCNC: 11.3 G/DL (ref 13.3–17.7)
MCH RBC QN AUTO: 31.2 PG (ref 26.5–33)
MCH RBC QN AUTO: 31.5 PG (ref 26.5–33)
MCHC RBC AUTO-ENTMCNC: 32.4 G/DL (ref 31.5–36.5)
MCHC RBC AUTO-ENTMCNC: 32.4 G/DL (ref 31.5–36.5)
MCV RBC AUTO: 96 FL (ref 78–100)
MCV RBC AUTO: 97 FL (ref 78–100)
PLATELET # BLD AUTO: 456 10E9/L (ref 150–450)
PLATELET # BLD AUTO: 473 10E9/L (ref 150–450)
POTASSIUM SERPL-SCNC: 3.3 MMOL/L (ref 3.4–5.3)
POTASSIUM SERPL-SCNC: 3.7 MMOL/L (ref 3.4–5.3)
RBC # BLD AUTO: 3.46 10E12/L (ref 4.4–5.9)
RBC # BLD AUTO: 3.62 10E12/L (ref 4.4–5.9)
SODIUM SERPL-SCNC: 139 MMOL/L (ref 133–144)
SODIUM SERPL-SCNC: 141 MMOL/L (ref 133–144)
WBC # BLD AUTO: 6.4 10E9/L (ref 4–11)
WBC # BLD AUTO: 7.2 10E9/L (ref 4–11)

## 2018-02-11 PROCEDURE — G0378 HOSPITAL OBSERVATION PER HR: HCPCS

## 2018-02-11 PROCEDURE — 36415 COLL VENOUS BLD VENIPUNCTURE: CPT | Performed by: STUDENT IN AN ORGANIZED HEALTH CARE EDUCATION/TRAINING PROGRAM

## 2018-02-11 PROCEDURE — 25800025 ZZH RX 258: Performed by: STUDENT IN AN ORGANIZED HEALTH CARE EDUCATION/TRAINING PROGRAM

## 2018-02-11 PROCEDURE — 71045 X-RAY EXAM CHEST 1 VIEW: CPT

## 2018-02-11 PROCEDURE — 25000131 ZZH RX MED GY IP 250 OP 636 PS 637: Performed by: STUDENT IN AN ORGANIZED HEALTH CARE EDUCATION/TRAINING PROGRAM

## 2018-02-11 PROCEDURE — 25000132 ZZH RX MED GY IP 250 OP 250 PS 637: Performed by: STUDENT IN AN ORGANIZED HEALTH CARE EDUCATION/TRAINING PROGRAM

## 2018-02-11 PROCEDURE — 96375 TX/PRO/DX INJ NEW DRUG ADDON: CPT

## 2018-02-11 PROCEDURE — 25000125 ZZHC RX 250: Performed by: STUDENT IN AN ORGANIZED HEALTH CARE EDUCATION/TRAINING PROGRAM

## 2018-02-11 PROCEDURE — 27210995 ZZH RX 272: Performed by: STUDENT IN AN ORGANIZED HEALTH CARE EDUCATION/TRAINING PROGRAM

## 2018-02-11 PROCEDURE — 96376 TX/PRO/DX INJ SAME DRUG ADON: CPT

## 2018-02-11 PROCEDURE — 40000556 ZZH STATISTIC PERIPHERAL IV START W US GUIDANCE

## 2018-02-11 PROCEDURE — 85027 COMPLETE CBC AUTOMATED: CPT | Performed by: STUDENT IN AN ORGANIZED HEALTH CARE EDUCATION/TRAINING PROGRAM

## 2018-02-11 PROCEDURE — 00000146 ZZHCL STATISTIC GLUCOSE BY METER IP

## 2018-02-11 PROCEDURE — 25000128 H RX IP 250 OP 636: Performed by: STUDENT IN AN ORGANIZED HEALTH CARE EDUCATION/TRAINING PROGRAM

## 2018-02-11 PROCEDURE — 96366 THER/PROPH/DIAG IV INF ADDON: CPT

## 2018-02-11 PROCEDURE — 96372 THER/PROPH/DIAG INJ SC/IM: CPT

## 2018-02-11 PROCEDURE — 80048 BASIC METABOLIC PNL TOTAL CA: CPT | Performed by: STUDENT IN AN ORGANIZED HEALTH CARE EDUCATION/TRAINING PROGRAM

## 2018-02-11 PROCEDURE — 12000001 ZZH R&B MED SURG/OB UMMC

## 2018-02-11 RX ORDER — ACETAMINOPHEN 325 MG/1
650 TABLET ORAL EVERY 6 HOURS
Status: DISCONTINUED | OUTPATIENT
Start: 2018-02-11 | End: 2018-02-11

## 2018-02-11 RX ORDER — ALUMINA, MAGNESIA, AND SIMETHICONE 2400; 2400; 240 MG/30ML; MG/30ML; MG/30ML
30 SUSPENSION ORAL EVERY 4 HOURS PRN
Status: DISCONTINUED | OUTPATIENT
Start: 2018-02-11 | End: 2018-02-13 | Stop reason: HOSPADM

## 2018-02-11 RX ORDER — ACETAMINOPHEN 325 MG/1
650 TABLET ORAL EVERY 4 HOURS PRN
Status: DISCONTINUED | OUTPATIENT
Start: 2018-02-11 | End: 2018-02-12

## 2018-02-11 RX ORDER — ONDANSETRON 2 MG/ML
4 INJECTION INTRAMUSCULAR; INTRAVENOUS EVERY 6 HOURS PRN
Status: DISCONTINUED | OUTPATIENT
Start: 2018-02-11 | End: 2018-02-13 | Stop reason: HOSPADM

## 2018-02-11 RX ORDER — DEXTROSE MONOHYDRATE, SODIUM CHLORIDE, AND POTASSIUM CHLORIDE 50; 1.49; 4.5 G/1000ML; G/1000ML; G/1000ML
INJECTION, SOLUTION INTRAVENOUS CONTINUOUS
Status: DISCONTINUED | OUTPATIENT
Start: 2018-02-11 | End: 2018-02-12

## 2018-02-11 RX ORDER — DEXTROSE MONOHYDRATE 25 G/50ML
25-50 INJECTION, SOLUTION INTRAVENOUS
Status: DISCONTINUED | OUTPATIENT
Start: 2018-02-11 | End: 2018-02-11

## 2018-02-11 RX ORDER — NICOTINE POLACRILEX 4 MG
15-30 LOZENGE BUCCAL
Status: DISCONTINUED | OUTPATIENT
Start: 2018-02-11 | End: 2018-02-13 | Stop reason: HOSPADM

## 2018-02-11 RX ORDER — IBUPROFEN 200 MG
200-400 TABLET ORAL EVERY 6 HOURS PRN
Status: DISCONTINUED | OUTPATIENT
Start: 2018-02-11 | End: 2018-02-13 | Stop reason: HOSPADM

## 2018-02-11 RX ORDER — OXYCODONE HYDROCHLORIDE 5 MG/1
5-10 TABLET ORAL EVERY 4 HOURS PRN
Status: DISCONTINUED | OUTPATIENT
Start: 2018-02-11 | End: 2018-02-11

## 2018-02-11 RX ORDER — ONDANSETRON 4 MG/1
4 TABLET, ORALLY DISINTEGRATING ORAL EVERY 6 HOURS PRN
Status: DISCONTINUED | OUTPATIENT
Start: 2018-02-11 | End: 2018-02-13 | Stop reason: HOSPADM

## 2018-02-11 RX ORDER — ACETAMINOPHEN 650 MG/1
650 SUPPOSITORY RECTAL EVERY 4 HOURS PRN
Status: DISCONTINUED | OUTPATIENT
Start: 2018-02-11 | End: 2018-02-13 | Stop reason: HOSPADM

## 2018-02-11 RX ORDER — NICOTINE POLACRILEX 4 MG
15-30 LOZENGE BUCCAL
Status: DISCONTINUED | OUTPATIENT
Start: 2018-02-11 | End: 2018-02-11

## 2018-02-11 RX ORDER — POLYETHYLENE GLYCOL 3350 17 G/17G
17 POWDER, FOR SOLUTION ORAL DAILY PRN
Status: DISCONTINUED | OUTPATIENT
Start: 2018-02-11 | End: 2018-02-12

## 2018-02-11 RX ORDER — GABAPENTIN 300 MG/1
300 CAPSULE ORAL 3 TIMES DAILY
Status: DISCONTINUED | OUTPATIENT
Start: 2018-02-11 | End: 2018-02-13 | Stop reason: HOSPADM

## 2018-02-11 RX ORDER — OXYCODONE HYDROCHLORIDE 5 MG/1
5-10 TABLET ORAL EVERY 6 HOURS PRN
Status: DISCONTINUED | OUTPATIENT
Start: 2018-02-11 | End: 2018-02-12

## 2018-02-11 RX ORDER — AMOXICILLIN 250 MG
1 CAPSULE ORAL 2 TIMES DAILY PRN
Status: DISCONTINUED | OUTPATIENT
Start: 2018-02-11 | End: 2018-02-12

## 2018-02-11 RX ORDER — CEFOXITIN 2 G/1
2 INJECTION, POWDER, FOR SOLUTION INTRAVENOUS EVERY 6 HOURS
Status: DISCONTINUED | OUTPATIENT
Start: 2018-02-11 | End: 2018-02-13 | Stop reason: HOSPADM

## 2018-02-11 RX ORDER — DEXTROSE MONOHYDRATE 25 G/50ML
25-50 INJECTION, SOLUTION INTRAVENOUS
Status: DISCONTINUED | OUTPATIENT
Start: 2018-02-11 | End: 2018-02-13 | Stop reason: HOSPADM

## 2018-02-11 RX ORDER — NALOXONE HYDROCHLORIDE 0.4 MG/ML
.1-.4 INJECTION, SOLUTION INTRAMUSCULAR; INTRAVENOUS; SUBCUTANEOUS
Status: DISCONTINUED | OUTPATIENT
Start: 2018-02-11 | End: 2018-02-13 | Stop reason: HOSPADM

## 2018-02-11 RX ADMIN — ELVITEGRAVIR, COBICISTAT, EMTRICITABINE, AND TENOFOVIR ALAFENAMIDE 1 TABLET: 150; 150; 200; 10 TABLET ORAL at 08:12

## 2018-02-11 RX ADMIN — OXYCODONE HYDROCHLORIDE 10 MG: 5 TABLET ORAL at 15:31

## 2018-02-11 RX ADMIN — Medication 5 MG: at 08:12

## 2018-02-11 RX ADMIN — CEFOXITIN SODIUM 2 G: 2 POWDER, FOR SOLUTION INTRAVENOUS at 15:18

## 2018-02-11 RX ADMIN — GABAPENTIN 300 MG: 300 CAPSULE ORAL at 08:11

## 2018-02-11 RX ADMIN — CEFOXITIN SODIUM 2 G: 2 POWDER, FOR SOLUTION INTRAVENOUS at 01:00

## 2018-02-11 RX ADMIN — GABAPENTIN 300 MG: 300 CAPSULE ORAL at 15:17

## 2018-02-11 RX ADMIN — METRONIDAZOLE 500 MG: 500 INJECTION, SOLUTION INTRAVENOUS at 22:11

## 2018-02-11 RX ADMIN — METRONIDAZOLE 500 MG: 500 INJECTION, SOLUTION INTRAVENOUS at 05:07

## 2018-02-11 RX ADMIN — INSULIN ASPART 0.5 UNITS: 100 INJECTION, SOLUTION INTRAVENOUS; SUBCUTANEOUS at 20:11

## 2018-02-11 RX ADMIN — CEFOXITIN SODIUM 2 G: 2 POWDER, FOR SOLUTION INTRAVENOUS at 03:30

## 2018-02-11 RX ADMIN — OXYCODONE HYDROCHLORIDE 5 MG: 5 TABLET ORAL at 03:33

## 2018-02-11 RX ADMIN — GABAPENTIN 300 MG: 300 CAPSULE ORAL at 20:07

## 2018-02-11 RX ADMIN — OXYCODONE HYDROCHLORIDE 5 MG: 5 TABLET ORAL at 05:54

## 2018-02-11 RX ADMIN — OXYCODONE HYDROCHLORIDE 10 MG: 5 TABLET ORAL at 10:25

## 2018-02-11 RX ADMIN — METRONIDAZOLE 500 MG: 500 INJECTION, SOLUTION INTRAVENOUS at 15:17

## 2018-02-11 RX ADMIN — CEFOXITIN SODIUM 2 G: 2 POWDER, FOR SOLUTION INTRAVENOUS at 08:12

## 2018-02-11 RX ADMIN — POTASSIUM CHLORIDE, DEXTROSE MONOHYDRATE AND SODIUM CHLORIDE: 150; 5; 450 INJECTION, SOLUTION INTRAVENOUS at 01:54

## 2018-02-11 RX ADMIN — OXYCODONE HYDROCHLORIDE 10 MG: 5 TABLET ORAL at 20:07

## 2018-02-11 RX ADMIN — OXYCODONE HYDROCHLORIDE 5 MG: 5 TABLET ORAL at 01:54

## 2018-02-11 NOTE — H&P
GENERAL SURGERY H&P  February 11, 2018      HISTORY PRESENTING ILLNESS: Andrew Lockwood is a 50 year old male homeless male with hx of HIV, CMV, CNS toxoplasmosis, DMII, DANISHA who is s/p lap appendectomy on 1/131 for grangreous appendicitis who presents with increased abdominal pain since discharge. He was previously readdmited on 2/4 with an abscess and now s/p IR drainage. He was discharged with 2 weeks of antibiotics which he reports he ran out of 1 week ago. He reports increased pain in the RLQ and at the tube site.    REVIEW OF SYSTEMS: As noted above. No headache, dizziness. No fever, chills. No chest pain or shortness of breath. No nausea, vomiting. No diarrhea or constipation. No urinary difficulties. No muscle or body aches.    PAST MEDICAL HISTORY:   Past Medical History:   Diagnosis Date     AIDS (H)      Allergic rhinitis due to other allergen     DNS     Chronic abdominal pain      CNS toxoplasmosis (H)      Diabetes type 2, controlled (H)      GERD (gastroesophageal reflux disease)      HIV (human immunodeficiency virus infection) (H)      Periungual wart      Sleep apnea     doesn't use CPAP       SURGICAL HISTORY:   Past Surgical History:   Procedure Laterality Date     C NONSPECIFIC PROCEDURE      right forearm fracture     COLONOSCOPY Left 1/22/2016    Procedure: COMBINED COLONOSCOPY, SINGLE OR MULTIPLE BIOPSY/POLYPECTOMY BY BIOPSY;  Surgeon: Clark Saini MD;  Location: [facility]     HC EXPLORE UNDESC TESTIS,INGUIN/SCROTAL       LAPAROSCOPIC APPENDECTOMY N/A 1/31/2018    Procedure: LAPAROSCOPIC APPENDECTOMY;  LAPAROSCOPIC APPENDECTOMY;  Surgeon: Dawn Holt MD;  Location: [facility]     LAPAROSCOPY DIAGNOSTIC (GENERAL) N/A 7/26/2016    Procedure: LAPAROSCOPY DIAGNOSTIC (GENERAL);  Surgeon: Susannah Arriaga MD;  Location: [facility]     OPTICAL TRACKING SYSTEM CRANIOTOMY, EXCISE TUMOR, COMBINED Left 4/10/2015    Procedure: COMBINED OPTICAL TRACKING SYSTEM CRANIOTOMY, EXCISE  "TUMOR;  Surgeon: Mirlande Colmenares MD;  Location: [facility]     REPAIR GAMEKEEPER'S THUMB Right 12/2/2016    Procedure: REPAIR LIGAMENT ULNAR COLLATERAL THUMB (GAMEKEEPER'S);  Surgeon: Evin Zamorano MD;  Location: [facility]       SOCIAL HISTORY:   Social History     Social History     Marital status: Single     Spouse name: N/A     Number of children: N/A     Years of education: N/A     Occupational History           5 years; temp agency     Social History Main Topics     Smoking status: Current Every Day Smoker     Packs/day: 0.20     Last attempt to quit: 10/28/2016     Smokeless tobacco: Former User      Comment: 4 cigarettes     Alcohol use No      Comment: Last etoh in 2007     Drug use: No      Comment: \"Not very often\"     Sexual activity: Not Currently     Partners: Female, Male      Comment: Last sexual activity 2008     Other Topics Concern     Not on file     Social History Narrative    Born in [facility].  Came to the [place] in 1993.  Last traveled to visit family in 2008.              FAMILY HISTORY: No bleeding/clotting disorders nor problems with anesthesia.     ALLERGIES:      Allergies   Allergen Reactions     Metformin      Abdominal pain     Milk [Lac Bovis] Hives     Tylenol [Acetaminophen] Itching       MEDICATIONS:    No current facility-administered medications on file prior to encounter.   Current Outpatient Prescriptions on File Prior to Encounter:  cefpodoxime (VANTIN) 200 MG tablet Take 1 tablet (200 mg) by mouth 2 times daily for 14 days   oxyCODONE IR (ROXICODONE) 5 MG tablet Take 1-2 tablets (5-10 mg) by mouth every 4 hours as needed for other (pain control or improvement in physical function. Hold dose for analgesic side effects.)   acetaminophen (TYLENOL) 325 MG tablet Take 2 tablets (650 mg) by mouth every 6 hours   polyethylene glycol (MIRALAX/GLYCOLAX) Packet Take 17 g by mouth 2 times daily   sodium chloride, PF, 0.9% PF flush Flush abdominal drain three times " per day as instructed.   metroNIDAZOLE (FLAGYL) 500 MG tablet Take 1 tablet (500 mg) by mouth 2 times daily for 13 days   senna-docusate (SENOKOT-S;PERICOLACE) 8.6-50 MG per tablet Take 1 tablet by mouth 2 times daily as needed for constipation   polyethylene glycol (MIRALAX/GLYCOLAX) Packet Take 17 g by mouth daily as needed for constipation   ibuprofen (ADVIL/MOTRIN) 200 MG tablet Take 1-2 tablets (200-400 mg) by mouth every 6 hours as needed for moderate pain   oxyCODONE IR (ROXICODONE) 5 MG tablet Take 1 tablet (5 mg) by mouth every 4 hours as needed for pain maximum 6 tablet(s) per day   alum & mag hydroxide-simethicone (MAALOX ADVANCED MAX ST) 400-400-40 MG/5ML SUSP suspension Take 30 mLs by mouth every 4 hours as needed for indigestion   blood glucose monitoring (NO BRAND SPECIFIED) meter device kit Use to test blood sugar 4 times daily or as directed.   blood glucose monitoring (NO BRAND SPECIFIED) test strip 1 strip by In Vitro route 4 times daily (before meals and nightly) Use to test blood sugars 4 times daily or as directed   glipiZIDE (GLUCOTROL) 5 MG tablet Take 0.5-1 tablets (2.5-5 mg) by mouth daily   gabapentin (NEURONTIN) 300 MG capsule Take 1 capsule (300 mg) by mouth 3 times daily   GENVOYA 148-667-123-10 mg tablet Take 1 tablet by mouth daily   blood glucose monitoring (NO BRAND SPECIFIED) meter device kit Use to test blood sugar four times daily or as directed.   blood glucose monitoring (NO BRAND SPECIFIED) test strip Use to test blood sugars four times daily or as directed   blood glucose (NO BRAND SPECIFIED) lancets standard Use to test blood sugar four times daily or as directed.       PHYSICAL EXAMINATION:  Temp:  [98.4  F (36.9  C)-98.5  F (36.9  C)] 98.5  F (36.9  C)  Pulse:  [92] 92  Heart Rate:  [83] 83  Resp:  [14-16] 14  BP: (109-125)/(69-80) 125/80  SpO2:  [94 %-100 %] 96 %  General: Alert, well-appearing in no acute distress.  HEENT: Normocephalic, atraumatic. Patent nares.   Neck:  No cervical lymphadenopathy. No thyromegaly.   Chest wall: Symmetric thorax. No masses or tenderness to palpation.   Respiratory: Non-labored breathing. Lung sounds clear to auscultation bilaterally.   Cardiovascular: Regular rate and rhythm.   Gastrointestinal: Abdomen soft, non-distended, tender in RLQ, no rebound or guarding. Drain with purulent output  Extremities: Moving all four extremities. No limb deformities. No pedal edema.   Skin: As noted above. No rashes or lesions appreciated.    LABS: Reviewed.   Arterial Blood Gases   No lab results found in last 7 days.  Complete Blood Count     Recent Labs  Lab 02/11/18  0147 02/10/18  1641 02/07/18  1112 02/05/18  0901   WBC 6.4 7.3 10.7 8.4   HGB 10.9* 11.4* 12.0* 10.9*   * 501* 381 254     Basic Metabolic Panel    Recent Labs  Lab 02/11/18  0147 02/10/18  1641 02/05/18  0901 02/04/18  2345 02/04/18  1531    138 131*  --  134   POTASSIUM 3.3* 3.5 3.2* 3.6 3.0*   CHLORIDE 110* 107 100  --  99   CO2 22 24 23  --  24   BUN 7 12 7  --  13   CR 0.68 0.62* 0.66  --  0.67   * 173* 98  --  174*     Liver Function Tests    Recent Labs  Lab 02/10/18  1641 02/05/18  0901 02/04/18  1531   AST 16  --  25   ALT 21  --  30   ALKPHOS 148  --  177*   BILITOTAL 0.2  --  0.7   ALBUMIN 2.7*  --  2.4*   INR  --  1.06  --      Pancreatic Enzymes    Recent Labs  Lab 02/10/18  1641 02/04/18  1531   LIPASE 343 175     Coagulation Profile    Recent Labs  Lab 02/05/18  0901   INR 1.06         IMAGING:  Results for orders placed or performed during the hospital encounter of 02/10/18   CT Abdomen Pelvis w/o Contrast    Narrative    EXAMINATION: CT ABDOMEN PELVIS W/O CONTRAST, 2/10/2018 8:03 PM    TECHNIQUE: Helical CT images from the lung bases through the symphysis  pubis were obtained without IV contrast.     COMPARISON: 2/5/2018, 2/4/2018, 1/31/2018    HISTORY: Worsening drainage and pain in RLQ    FINDINGS:    Abdomen and pelvis:   Stable position of the percutaneous  right lower quadrant surgical  drain with substantially decreased size of the intra-abdominal  abscess. Tiny focal residual fluid collection superior to the drain  containing a few tiny foci of extraluminal gas (series 3, image 242).  The adjacent terminal ileum and cecum appears thickened with  surrounding fat stranding which is stable to mildly improved.    The liver, gallbladder, spleen, adrenal glands, pancreas, and kidneys  appear normal. Prominent prostate. No dilated loops of bowel. Normal  caliber abdominal aorta. Several enlarged right lower quadrant lymph  nodes.    Lung bases:   The heart is not enlarged. No pericardial effusion. Tiny hiatal  hernia. Mild dependent bibasilar atelectasis.    Bones and soft tissues:   Unremarkable appearance of the penile prosthesis, including pelvic  reservoir and right scrotal components. Small surrounding hydrocele.  Minimal subcutaneous fat stranding with no fluid collection  surrounding the right lower quadrant drain in the anterior abdominal  wall. No acute or worrisome osseous lesions.        Impression    IMPRESSION:   1. Stable position of the percutaneous right lower quadrant drain with  decreased size of the abdominal abscess.  2. There is residual inflammatory changes in the right lower quadrant,  including fat stranding, reactive lymphadenopathy, and thickening of  the terminal ileal wall.  3. Only mild inflammatory changes in the abdominal wall surrounding  the right lower quadrant drain.    I have personally reviewed the examination and initial interpretation  and I agree with the findings.    ALFREDA SHIPLEY MD         CO-MORBIDITIES:   Abdominal abscess (H)    ASSESSMENT: Andrew Lockwood is a 50 year old male s/p lap appendectomy on 1/31 for grangreous appendicitis who presents with increased abdominal pain since discharge. He was previously readdmited on 2/4 with an abscess and now s/p IR drainage. From the history he does not appear to be taking his  discharge antibiotics.    PLAN:    - Admit for observation   - IV antibiotics  - Regular diet  - Repeat labs this AM.    Patient seen, findings and plan discussed with staff, Dr. Escoto.    Shaw Verde   Surgery PGY3  [phone]

## 2018-02-11 NOTE — PLAN OF CARE
Problem: Patient Care Overview  Goal: Plan of Care/Patient Progress Review  Outcome: No Change   Observation goals PRIOR TO DISCHARGE     Comments: -diagnostic tests and consults completed and resulted  NO  -vital signs normal or at patient baseline  Afebrile, VSS        Pt with abdominal drain. Drainage creamy yellow.

## 2018-02-11 NOTE — PLAN OF CARE
Problem: Patient Care Overview  Goal: Plan of Care/Patient Progress Review  diagnostic tests and consults completed and resulted -No  -vital signs normal or at patient baseline  Afebrile, VSS          Pt with abdominal drain. Drainage creamy yellow.   /80  Pulse 92  Temp 98.5  F (36.9  C)  Resp 14  Wt 75.4 kg (166 lb 4.8 oz)  SpO2 96%  BMI 27.67 kg/m2

## 2018-02-11 NOTE — PLAN OF CARE
Problem: Patient Care Overview  Goal: Plan of Care/Patient Progress Review  Pt arrived via cart from ER, belongings in room. Home meds sent to security.

## 2018-02-11 NOTE — PLAN OF CARE
Problem: Patient Care Overview  Goal: Individualization & Mutuality  Observation goals PRIOR TO DISCHARGE     Comments: -diagnostic tests and consults completed and resulted  NO  -vital signs normal or at patient baseline  Afebrile, VSS     Antiboitics as ordered. Social work consult. Pt homeless and states would like housing.

## 2018-02-11 NOTE — PLAN OF CARE
Problem: Patient Care Overview  Goal: Plan of Care/Patient Progress Review  Outcome: No Change  Observation goals PRIOR TO DISCHARGE     Comments: -diagnostic tests and consults completed and resulted.  No  -vital signs normal or at patient baseline.  Yes

## 2018-02-11 NOTE — ED PROVIDER NOTES
History     Chief Complaint   Patient presents with     Knee Pain     HPI  Andrew Lockwood is a 50 year old male with a history of DM, HIV/AIDS who presents with knee pain.  He has a history of osteoarthritis and chronic knee pain.  Two weeks ago, he had a steroid injection in to his left knee for pain.  He had relief with this injection in the past.  He states this time he has no relief.  He denies any leg swelling.  No history of DVT.  He denies any trauma.       PAST MEDICAL HISTORY:   Past Medical History:   Diagnosis Date     AIDS (H)      Allergic rhinitis due to other allergen     DNS     Chronic abdominal pain      CNS toxoplasmosis (H)      Diabetes type 2, controlled (H)      GERD (gastroesophageal reflux disease)      HIV (human immunodeficiency virus infection) (H)      Periungual wart      Sleep apnea     doesn't use CPAP       PAST SURGICAL HISTORY:   Past Surgical History:   Procedure Laterality Date     C NONSPECIFIC PROCEDURE      right forearm fracture     COLONOSCOPY Left 1/22/2016    Procedure: COMBINED COLONOSCOPY, SINGLE OR MULTIPLE BIOPSY/POLYPECTOMY BY BIOPSY;  Surgeon: Clark Saini MD;  Location: [facility]     HC EXPLORE UNDESC TESTIS,INGUIN/SCROTAL       LAPAROSCOPIC APPENDECTOMY N/A 1/31/2018    Procedure: LAPAROSCOPIC APPENDECTOMY;  LAPAROSCOPIC APPENDECTOMY;  Surgeon: Dawn Holt MD;  Location: [facility]     LAPAROSCOPY DIAGNOSTIC (GENERAL) N/A 7/26/2016    Procedure: LAPAROSCOPY DIAGNOSTIC (GENERAL);  Surgeon: Susannah Arriaga MD;  Location: [facility]     OPTICAL TRACKING SYSTEM CRANIOTOMY, EXCISE TUMOR, COMBINED Left 4/10/2015    Procedure: COMBINED OPTICAL TRACKING SYSTEM CRANIOTOMY, EXCISE TUMOR;  Surgeon: Mirlande Colmenares MD;  Location: [facility]     REPAIR GAMEKEEPER'S THUMB Right 12/2/2016    Procedure: REPAIR LIGAMENT ULNAR COLLATERAL THUMB (GAMEKEEPER'S);  Surgeon: Evin Zamorano MD;  Location: [facility]       FAMILY HISTORY:   Family History   Problem  Relation Age of Onset     DIABETES Brother      DIABETES Father      Alzheimer Disease Father      Unknown/Adopted Mother      DIABETES Paternal Grandfather      CANCER No family hx of      no skin cancer     Skin Cancer No family hx of      no famiy hx of skin cancer       SOCIAL HISTORY:   Social History   Substance Use Topics     Smoking status: Current Every Day Smoker     Packs/day: 0.20     Last attempt to quit: 10/28/2016     Smokeless tobacco: Former User      Comment: 4 cigarettes     Alcohol use No      Comment: Last etoh in 2007       Discharge Medication List as of 1/28/2018  6:01 AM      CONTINUE these medications which have NOT CHANGED    Details   glipiZIDE (GLUCOTROL) 5 MG tablet Take 0.5-1 tablets (2.5-5 mg) by mouth daily, Disp-30 tablet, R-11, E-Prescribe      AZITHROMYCIN PO Take 600 mg by mouth Take 2 tablets every Sunday., Historical      DICYCLOMINE HCL PO Take 10 mg by mouth 4 times daily, Historical      montelukast (SINGULAIR) 10 MG tablet Take 10 mg by mouth At Bedtime, Historical      gabapentin (NEURONTIN) 300 MG capsule Take 1 capsule (300 mg) by mouth 3 times daily, Disp-90 capsule, R-5, E-Prescribe      GENVOYA 404-579-420-10 mg tablet Take 1 tablet by mouth daily, Disp-30 tablet, R-5, E-Prescribe      blood glucose (NO BRAND SPECIFIED) lancets standard Use to test blood sugar four times daily or as directed.Disp-100 Box, R-5Local Print      !! blood glucose monitoring (NO BRAND SPECIFIED) meter device kit Use to test blood sugar 4 times daily or as directed.Disp-1 kit, [id number]      !! blood glucose monitoring (NO BRAND SPECIFIED) test strip 1 strip by In Vitro route 4 times daily (before meals and nightly) Use to test blood sugars 4 times daily or as directed, Disp-100 strip, R-11, E-Prescribe      pseudoePHEDrine (SUDAFED) 30 MG tablet Take 2 tablets (60 mg) by mouth every 6 hours as needed for congestion, Disp-112 tablet, R-0, Local Print      sulfamethoxazole-trimethoprim  "(BACTRIM DS/SEPTRA DS) 800-160 MG per tablet TAKE 1 TABLET BY MOUTH 2 TIMES DAILY, Disp-60 tablet, R-5, E-Prescribe      !! blood glucose monitoring (NO BRAND SPECIFIED) meter device kit Use to test blood sugar four times daily or as directed.Disp-1 kit, R-0Local Print      !! blood glucose monitoring (NO BRAND SPECIFIED) test strip Use to test blood sugars four times daily or as directed, Disp-100 each, R-5, Local Print       !! - Potential duplicate medications found. Please discuss with provider.             Allergies   Allergen Reactions     Metformin      Abdominal pain     Milk [Lac Bovis] Hives     Tylenol [Acetaminophen] Itching       I have reviewed the Medications, Allergies, Past Medical and Surgical History, and Social History in the Epic system.    Review of Systems   All other systems reviewed and are negative.      Physical Exam   BP: 141/90  Pulse: 98  Heart Rate: 87  Temp: 97.6  F (36.4  C)  Resp: 16  Height: 165.1 cm (5' 5\")  Weight: 79.9 kg (176 lb 3.2 oz) (standing scale)  SpO2: 99 %      Physical Exam   Constitutional: He is oriented to person, place, and time. No distress.   HENT:   Head: Normocephalic and atraumatic.   Eyes: Conjunctivae are normal. Pupils are equal, round, and reactive to light.   Neck: Normal range of motion. Neck supple.   Cardiovascular: Normal rate and intact distal pulses.    Pulmonary/Chest: Effort normal. No respiratory distress. He has no wheezes. He has no rales.   Abdominal: Soft.   Musculoskeletal: Normal range of motion.   L knee with normal ROM, no effusion, no focal tenderness   Neurological: He is alert and oriented to person, place, and time.   Skin: Skin is warm and dry. He is not diaphoretic.   Nursing note and vitals reviewed.      ED Course     ED Course     Procedures             Critical Care time:  none             Labs Ordered and Resulted from Time of ED Arrival Up to the Time of Departure from the ED   GLUCOSE BY METER - Abnormal; Notable for the " following:        Result Value    Glucose 369 (*)     All other components within normal limits            Assessments & Plan (with Medical Decision Making)   1.  Left knee osteoarthritis    49 yo M with a history of HIV/AIDS who presents with chronic left knee pain.  He had a steroid injection two weeks ago, but he has had no benefit.  His exam is normal with no signs of infection.  He denies trauma.  Appears to be his chronic pain from osteoarthritis.  He will follow up with the clinic.       I have reviewed the nursing notes.    I have reviewed the findings, diagnosis, plan and need for follow up with the patient.    Discharge Medication List as of 1/28/2018  6:01 AM          Final diagnoses:   Chronic pain of left knee       1/28/2018   [facility], [place], EMERGENCY DEPARTMENT     Angelo Estes MD  02/11/18 [id number]

## 2018-02-12 ENCOUNTER — APPOINTMENT (OUTPATIENT)
Dept: INTERVENTIONAL RADIOLOGY/VASCULAR | Facility: CLINIC | Age: 53
DRG: 862 | End: 2018-02-12
Attending: NURSE PRACTITIONER
Payer: COMMERCIAL

## 2018-02-12 LAB
BACTERIA SPEC CULT: ABNORMAL
GLUCOSE BLDC GLUCOMTR-MCNC: 135 MG/DL (ref 70–99)
GLUCOSE BLDC GLUCOMTR-MCNC: 146 MG/DL (ref 70–99)
GLUCOSE BLDC GLUCOMTR-MCNC: 163 MG/DL (ref 70–99)
GLUCOSE BLDC GLUCOMTR-MCNC: 179 MG/DL (ref 70–99)
GLUCOSE BLDC GLUCOMTR-MCNC: 180 MG/DL (ref 70–99)
GLUCOSE BLDC GLUCOMTR-MCNC: 212 MG/DL (ref 70–99)
GLUCOSE BLDC GLUCOMTR-MCNC: 226 MG/DL (ref 70–99)
GLUCOSE BLDC GLUCOMTR-MCNC: 52 MG/DL (ref 70–99)
GLUCOSE BLDC GLUCOMTR-MCNC: 80 MG/DL (ref 70–99)
Lab: ABNORMAL
SPECIMEN SOURCE: ABNORMAL

## 2018-02-12 PROCEDURE — 25000128 H RX IP 250 OP 636: Performed by: PHYSICIAN ASSISTANT

## 2018-02-12 PROCEDURE — 25000128 H RX IP 250 OP 636: Performed by: STUDENT IN AN ORGANIZED HEALTH CARE EDUCATION/TRAINING PROGRAM

## 2018-02-12 PROCEDURE — 12000001 ZZH R&B MED SURG/OB UMMC

## 2018-02-12 PROCEDURE — 76080 X-RAY EXAM OF FISTULA: CPT

## 2018-02-12 PROCEDURE — 25000125 ZZHC RX 250: Performed by: STUDENT IN AN ORGANIZED HEALTH CARE EDUCATION/TRAINING PROGRAM

## 2018-02-12 PROCEDURE — 25000132 ZZH RX MED GY IP 250 OP 250 PS 637: Performed by: STUDENT IN AN ORGANIZED HEALTH CARE EDUCATION/TRAINING PROGRAM

## 2018-02-12 PROCEDURE — 0WPGX0Z REMOVAL OF DRAINAGE DEVICE FROM PERITONEAL CAVITY, EXTERNAL APPROACH: ICD-10-PCS | Performed by: PHYSICIAN ASSISTANT

## 2018-02-12 PROCEDURE — 25800025 ZZH RX 258: Performed by: STUDENT IN AN ORGANIZED HEALTH CARE EDUCATION/TRAINING PROGRAM

## 2018-02-12 PROCEDURE — 27210995 ZZH RX 272: Performed by: STUDENT IN AN ORGANIZED HEALTH CARE EDUCATION/TRAINING PROGRAM

## 2018-02-12 PROCEDURE — 00000146 ZZHCL STATISTIC GLUCOSE BY METER IP

## 2018-02-12 RX ORDER — POLYETHYLENE GLYCOL 3350 17 G/17G
17 POWDER, FOR SOLUTION ORAL 2 TIMES DAILY
Status: DISCONTINUED | OUTPATIENT
Start: 2018-02-12 | End: 2018-02-13 | Stop reason: HOSPADM

## 2018-02-12 RX ORDER — IODIXANOL 320 MG/ML
50 INJECTION, SOLUTION INTRAVASCULAR ONCE
Status: COMPLETED | OUTPATIENT
Start: 2018-02-12 | End: 2018-02-12

## 2018-02-12 RX ORDER — ACETAMINOPHEN 325 MG/1
650 TABLET ORAL EVERY 4 HOURS
Qty: 100 TABLET | Refills: 1 | Status: ON HOLD | OUTPATIENT
Start: 2018-02-12 | End: 2018-04-18

## 2018-02-12 RX ORDER — AMOXICILLIN 250 MG
1 CAPSULE ORAL 2 TIMES DAILY
Status: DISCONTINUED | OUTPATIENT
Start: 2018-02-12 | End: 2018-02-13 | Stop reason: HOSPADM

## 2018-02-12 RX ORDER — OXYCODONE HYDROCHLORIDE 5 MG/1
5 TABLET ORAL EVERY 6 HOURS PRN
Status: DISCONTINUED | OUTPATIENT
Start: 2018-02-12 | End: 2018-02-13 | Stop reason: HOSPADM

## 2018-02-12 RX ORDER — ACETAMINOPHEN 325 MG/1
650 TABLET ORAL EVERY 4 HOURS
Status: DISCONTINUED | OUTPATIENT
Start: 2018-02-12 | End: 2018-02-13 | Stop reason: HOSPADM

## 2018-02-12 RX ADMIN — CEFOXITIN SODIUM 2 G: 2 POWDER, FOR SOLUTION INTRAVENOUS at 09:59

## 2018-02-12 RX ADMIN — CEFOXITIN SODIUM 2 G: 2 POWDER, FOR SOLUTION INTRAVENOUS at 02:51

## 2018-02-12 RX ADMIN — ACETAMINOPHEN 650 MG: 325 TABLET, FILM COATED ORAL at 17:13

## 2018-02-12 RX ADMIN — GABAPENTIN 300 MG: 300 CAPSULE ORAL at 14:14

## 2018-02-12 RX ADMIN — ACETAMINOPHEN 650 MG: 325 TABLET, FILM COATED ORAL at 21:12

## 2018-02-12 RX ADMIN — OXYCODONE HYDROCHLORIDE 5 MG: 5 TABLET ORAL at 11:52

## 2018-02-12 RX ADMIN — CEFOXITIN SODIUM 2 G: 2 POWDER, FOR SOLUTION INTRAVENOUS at 16:45

## 2018-02-12 RX ADMIN — OXYCODONE HYDROCHLORIDE 10 MG: 5 TABLET ORAL at 06:02

## 2018-02-12 RX ADMIN — GABAPENTIN 300 MG: 300 CAPSULE ORAL at 19:21

## 2018-02-12 RX ADMIN — POTASSIUM CHLORIDE, DEXTROSE MONOHYDRATE AND SODIUM CHLORIDE: 150; 5; 450 INJECTION, SOLUTION INTRAVENOUS at 01:54

## 2018-02-12 RX ADMIN — ALUMINUM HYDROXIDE, MAGNESIUM HYDROXIDE, AND DIMETHICONE 30 ML: 400; 400; 40 SUSPENSION ORAL at 02:59

## 2018-02-12 RX ADMIN — IODIXANOL 5 ML: 320 INJECTION, SOLUTION INTRAVASCULAR at 13:57

## 2018-02-12 RX ADMIN — INSULIN ASPART 0.5 UNITS: 100 INJECTION, SOLUTION INTRAVENOUS; SUBCUTANEOUS at 10:00

## 2018-02-12 RX ADMIN — ACETAMINOPHEN 650 MG: 325 TABLET, FILM COATED ORAL at 14:14

## 2018-02-12 RX ADMIN — Medication 2.5 MG: at 10:08

## 2018-02-12 RX ADMIN — CEFOXITIN SODIUM 2 G: 2 POWDER, FOR SOLUTION INTRAVENOUS at 21:12

## 2018-02-12 RX ADMIN — GABAPENTIN 300 MG: 300 CAPSULE ORAL at 08:38

## 2018-02-12 RX ADMIN — OXYCODONE HYDROCHLORIDE 10 MG: 5 TABLET ORAL at 01:52

## 2018-02-12 RX ADMIN — ELVITEGRAVIR, COBICISTAT, EMTRICITABINE, AND TENOFOVIR ALAFENAMIDE 1 TABLET: 150; 150; 200; 10 TABLET ORAL at 10:11

## 2018-02-12 RX ADMIN — METRONIDAZOLE 500 MG: 500 INJECTION, SOLUTION INTRAVENOUS at 21:12

## 2018-02-12 RX ADMIN — METRONIDAZOLE 500 MG: 500 INJECTION, SOLUTION INTRAVENOUS at 14:14

## 2018-02-12 RX ADMIN — METRONIDAZOLE 500 MG: 500 INJECTION, SOLUTION INTRAVENOUS at 05:10

## 2018-02-12 NOTE — PROGRESS NOTES
/87 (BP Location: Right arm)  Pulse 92  Temp 98.4  F (36.9  C) (Oral)  Resp 14  Wt 75.4 kg (166 lb 4.8 oz)  SpO2 98%  BMI 27.67 kg/m2    Reason for admission: abdominal abcess post laparoscopic appendectomy  Vitals: wnl  Activity: independent  Pain: mid abdomen, left side of drain  Neuro: wnl  Cardiac: wnl  Respiratory: wnl  GI/: wnl  Diet: regular  Lines: PIV dextrose 5% & 0.45% NaCl + KCL 20mEq-50/hr  Wounds/Incisions: abdomen drain, minimal drainage  Labs/imaging/Consults: SW consult in am  Discharge Plan: 1-2 days

## 2018-02-12 NOTE — PROGRESS NOTES
Social Work Services Progress Note    Hospital Day: 2 (observation)  Date of Initial Social Work Evaluation:  Not completed   Collaborated with:  Pt, bedside RN    Data:  SW consulted for d/c planning. Pt is chronically homeless and has been seen by SW on previous recent admissions. Pt is well connected w/ community resources and community case management. Writer attempted to meet w/ pt w/  present as writer suspected that there might be a language barrier as it appears pt was not taking his abx and may not have been doing the VITO drain cares. Pt was not agreeable to having an .     Intervention:  D/C Planning    Assessment:  Pt was receptive to meeting w/ writer, but was not receptive and slightly irritated that a  was here. When writer explained rationale for writer requesting an , pt upset and claims that he was taking abxs as prescribed. Pt reports he has no concerns today other then he wants a bus token at discharge. Pt reports he is working at getting a hold of his friend. Pt is aware of shelter system and how to get into a shelter.     Plan:    Anticipated Disposition:  Home, no needs identified    Barriers to d/c plan:  Homeless-this is chronic and pt is well aware of community resources including shelter placement process    Follow Up:  No further SW needs identified

## 2018-02-12 NOTE — CONSULTS
[facility] ID SERVICE CONSULTATION     Patient:  Andrew Lockwood   Date of birth 11/27/1967, Medical record number [id number]  Date of Visit:  02/11/2018  Date of Admission: 2/10/2018  Consult Requester:Stella Escoto,* for HIV management          Assessment and Recommendations:   ASSESSMENT:  1. HIV, well controlled on ART  2. Recovering from appendicitis and intraabdominal abscess    RECOMMENDATION:  1. When Andrew is tolerating oral intake, transition to Cefidinir 300mg po bid And Flagyl 500mg po tid.   2. Surgical follow up per the primary team.   3. Follow up with Dr. Rosado in ID clinic on 2/22/18.    DISCUSSION:  When Mr. Lockwood is appropriate for discharge from the standpoint of the surgical service, I would make sure that Andrew has a script for at least a one month supply of oral flagyl and cefdinir. We will reassess him in clinic to determine the ultimate duration of therapy. He should have follow up with surgery or IR for evaluation of ongoing need for drain. ARV therapy should continue as previous. I discussed this with Andrew today. If there are any concerned about his ability to manage his medications, he can stop by our clinic after discharge to see our PharmD, Keesha Zelaya.     Attestation:  This patient has been seen and evaluated by me, Sharon Abdi MD. The plan was discussed with the patient and the primary team. I have reviewed today's vital signs, medications, labs and imaging.     Sharon Rosado MD MPH  Infectious Diseases  Pager [phone]  ________________________________________________________________    Consult Question:.  Admission Diagnosis: Abdominal abscess (H) [K65.1]         History of Present Illness:   Mr. Lockwood is a 50 year old man who I follow closely as an outpatient as his primary HIV provider.  Mr. Lockwood is a patient who has had issues with chronic abdominal pain.  He developed appendicitis in the last couple weeks that was complicated by an abscess  that is now appropriately drained. Of note, his cultures from the drain placement on 2.5.18 showed Strep anginosis, bacteroides and a fairly resistance including Amp/sulbactam, fluoroquinolones and Bactrim. He is admitted for observation in the setting of ongoing abdominal pain.  Imaging has been stable and when I saw him this morning, he said that he was doing well from this standpoint.  He has not had any fevers during this hospitalization. He is tolerating his antibiotics without rash or diarrhea. Andrew notes vision changes with his current diabetes drug. He notes these with lying down.  He has had frequent glucose checks here in the hospital which have all been above normal.  No hypoglycemia has been noted.     Recent culture results include:  Culture Micro   Date Value Ref Range Status   02/05/2018 Moderate growth  Escherichia coli   (A)  Final   02/05/2018 Heavy growth  Streptococcus anginosus   (A)  Final   02/05/2018 Heavy growth  Mixed gram positive sushma    Final   02/05/2018 (A)  Preliminary    Heavy growth  Bacteroides fragilis group  Susceptibility testing not routinely done     02/05/2018 (A)  Preliminary    Heavy growth  Bacteroides fragilis  Susceptibility testing not routinely done     02/05/2018 (A)  Preliminary    Plus  Heavy growth  Mixed aerobic and anaerobic sushma     02/05/2018 Culture in progress  Preliminary   02/05/2018 Canceled, Test credited  Final   02/05/2018 Incorrectly ordered by PCU/Clinic  Final   02/05/2018 Test reordered as correct code  Final   02/05/2018 Culture negative after 4 days  Preliminary          Review of Systems:   CONSTITUTIONAL:  No fevers or chills  EYES: negative for icterus, + vision changes.   RESPIRATORY:  negative for cough with sputum and dyspnea  CARDIOVASCULAR:  negative for chest pain, dyspnea  GASTROINTESTINAL:  negative for nausea, vomiting, diarrhea and constipation  INTEGUMENT:  negative for rash and pruritus         Past Medical History:     Past  Medical History:   Diagnosis Date     AIDS (H)      Allergic rhinitis due to other allergen     DNS     Chronic abdominal pain      CNS toxoplasmosis (H)      Diabetes type 2, controlled (H)      GERD (gastroesophageal reflux disease)      HIV (human immunodeficiency virus infection) (H)      Periungual wart      Sleep apnea     doesn't use CPAP            Past Surgical History:     Past Surgical History:   Procedure Laterality Date     C NONSPECIFIC PROCEDURE      right forearm fracture     COLONOSCOPY Left 1/22/2016    Procedure: COMBINED COLONOSCOPY, SINGLE OR MULTIPLE BIOPSY/POLYPECTOMY BY BIOPSY;  Surgeon: Clark Saini MD;  Location: [facility] GI     HC EXPLORE UNDESC TESTIS,INGUIN/SCROTAL       LAPAROSCOPIC APPENDECTOMY N/A 1/31/2018    Procedure: LAPAROSCOPIC APPENDECTOMY;  LAPAROSCOPIC APPENDECTOMY;  Surgeon: Dawn Holt MD;  Location: [facility] OR     LAPAROSCOPY DIAGNOSTIC (GENERAL) N/A 7/26/2016    Procedure: LAPAROSCOPY DIAGNOSTIC (GENERAL);  Surgeon: Susannah Arriaga MD;  Location: [facility] OR     OPTICAL TRACKING SYSTEM CRANIOTOMY, EXCISE TUMOR, COMBINED Left 4/10/2015    Procedure: COMBINED OPTICAL TRACKING SYSTEM CRANIOTOMY, EXCISE TUMOR;  Surgeon: Mirlande Colmenares MD;  Location: [facility] OR     REPAIR GAMEKEEPER'S THUMB Right 12/2/2016    Procedure: REPAIR LIGAMENT ULNAR COLLATERAL THUMB (GAMEKEEPER'S);  Surgeon: Evin Zamorano MD;  Location: [facility] OR            Family History:     Family History   Problem Relation Age of Onset     DIABETES Brother      DIABETES Father      Alzheimer Disease Father      Unknown/Adopted Mother      DIABETES Paternal Grandfather      CANCER No family hx of      no skin cancer     Skin Cancer No family hx of      no famiy hx of skin cancer            Social History:     Social History   Substance Use Topics     Smoking status: Current Every Day Smoker     Packs/day: 0.20     Last attempt to quit: 10/28/2016     Smokeless tobacco: Former User      Comment:  4 cigarettes     Alcohol use No      Comment: Last etoh in 2007     History   Sexual Activity     Sexual activity: Not Currently     Partners: Female, Male     Comment: Last sexual activity 2008            Current Medications (antimicrobials listed in bold):       cefOXitin  2 g Intravenous Q6H     metroNIDAZOLE  500 mg Intravenous Q8H     glipiZIDE  2.5-5 mg Oral Daily     Pnlqjrr-Dfmks-Sltwaujt-TenofAF  1 tablet Oral Daily     gabapentin  300 mg Oral TID     insulin aspart  0.5-2.5 Units Subcutaneous TID AC     insulin aspart  0.5-2.5 Units Subcutaneous At Bedtime            Allergies:     Allergies   Allergen Reactions     Metformin      Abdominal pain     Milk [Lac Bovis] Hives     Tylenol [Acetaminophen] Itching            Physical Exam:   Vitals were reviewed  Patient Vitals for the past 24 hrs:   BP Temp Temp src Pulse Heart Rate Resp SpO2 Weight   02/11/18 1532 116/79 97.7  F (36.5  C) Oral - 83 - - -   02/11/18 0021 125/80 98.5  F (36.9  C) - - 83 14 96 % 75.4 kg (166 lb 4.8 oz)   02/10/18 2315 - - - - - - 100 % -   02/10/18 2300 - - - - - - 98 % -   02/10/18 2257 - - - - - - 98 % -   02/10/18 2255 - - - - - - 98 % -   02/10/18 2254 - - - - - - 98 % -   02/10/18 2253 - - - - - - 98 % -   02/10/18 2252 - - - - - - 98 % -   02/10/18 2250 - - - - - - 99 % -   02/10/18 2249 - - - - - - 97 % -   02/10/18 2248 - - - - - - 94 % -   02/10/18 2247 - - - - - - 96 % -   02/10/18 2246 - - - - - - 96 % -   02/10/18 2244 - - - - - - 96 % -   02/10/18 2243 - - - - - - 99 % -   02/10/18 2242 - - - - - - 97 % -   02/10/18 2230 - - - - - - 99 % -   02/10/18 2215 - - - - - - 97 % -     Ranges for his vital signs:  Temp:  [97.7  F (36.5  C)-98.5  F (36.9  C)] 97.7  F (36.5  C)  Heart Rate:  [83] 83  Resp:  [14] 14  BP: (116-125)/(79-80) 116/79  SpO2:  [94 %-100 %] 96 %    Physical Examination:  GENERAL:  well-developed, well-nourished, in bed in no acute distress.   HEENT:  Head is normocephalic, atraumatic   EYES:  Eyes have  anicteric sclerae without conjunctival injection.  NECK:  Supple.   ABDOMEN:  Normal bowel sounds, soft, nontender. Drain in place  SKIN:  No acute rashes.No stigmata of endocarditis.  NEUROLOGIC:  Grossly nonfocal. Active x4 extremities. Normal gait.          Laboratory Data:     Inflammatory Markers    Recent Labs   Lab Test  12/28/16   1540  12/26/16   2213  12/18/15   0354  07/07/15   2205  05/25/15   2217  05/21/15   1300  04/18/15   0844  04/10/15   0728   SED   --    --    --   25*  37*   --    --    --    CRP  <2.9  <2.9  5.3  7.8  5.0  <2.9  3.8  <2.9       Hematology Studies  Recent Labs   Lab Test  02/11/18   0719  02/11/18   0147  02/10/18   1641 02/07/18   1112  02/05/18   0901  02/04/18   1531  02/03/18   1919  01/31/18   1155  01/30/18   2039  01/23/18   2340   WBC  7.2  6.4  7.3  10.7  8.4  9.7  8.9  17.1*  16.5*  9.8   ANEU   --    --   4.6   --    --   7.5  7.1  13.6*  12.6*  5.3   AEOS   --    --   0.3   --    --   0.1  0.1  0.0  0.1  0.1   HGB  11.3*  10.9*  11.4*  12.0*  10.9*  11.5*  11.3*  13.5  14.0  13.6   MCV  96  97  95  93  95  94  94  95  94  94   PLT  473*  456*  501*  381  254  246  220  189  234  235       Immune Globulin Studies  No lab results found.    Metabolic Studies   Recent Labs   Lab Test  02/11/18   0719  02/11/18   0147  02/10/18   1641 02/05/18   0901  02/04/18   2345  02/04/18   1531   NA  139  141  138  131*   --   134   POTASSIUM  3.7  3.3*  3.5  3.2*  3.6  3.0*   CHLORIDE  109  110*  107  100   --   99   CO2  22  22  24  23   --   24   BUN  8  7  12  7   --   13   CR  0.64*  0.68  0.62*  0.66   --   0.67   GFRESTIMATED  >90  >90  >90  >90   --   >90       Hepatic Studies  Recent Labs   Lab Test  02/10/18   1641  02/04/18   1531  02/03/18   1919  01/31/18   1155  01/30/18   2039  01/23/18   2340   BILITOTAL  0.2  0.7  1.4*  1.2  0.4  0.3   ALKPHOS  148  177*  148  83  106  171*   ALBUMIN  2.7*  2.4*  2.4*  3.5  3.8  3.5   AST  16  25  28  20  22  15   ALT  21  30  25   30  35  27       Thyroid Studies    Recent Labs   Lab Test  05/23/17   1247  04/14/15   0711   TSH  3.90  2.78   T4   --   1.02       Microbiology:  Culture Micro   Date Value Ref Range Status   02/05/2018 Moderate growth  Escherichia coli   (A)  Final   02/05/2018 Heavy growth  Streptococcus anginosus   (A)  Final   02/05/2018 Heavy growth  Mixed gram positive sushma    Final   02/05/2018 (A)  Preliminary    Heavy growth  Bacteroides fragilis group  Susceptibility testing not routinely done     02/05/2018 (A)  Preliminary    Heavy growth  Bacteroides fragilis  Susceptibility testing not routinely done     02/05/2018 (A)  Preliminary    Plus  Heavy growth  Mixed aerobic and anaerobic sushma     02/05/2018 Culture in progress  Preliminary   02/05/2018 Canceled, Test credited  Final   02/05/2018 Incorrectly ordered by PCU/Clinic  Final   02/05/2018 Test reordered as correct code  Final   02/05/2018 Culture negative after 4 days  Preliminary   02/04/2018 No growth  Final   11/07/2017 No growth  Final   11/07/2017 (A)  Final    Cultured on the 2nd day of incubation:  Staphylococcus capitis  This isolate DOES NOT demonstrate inducible clindamycin resistance in vitro. Clindamycin   is susceptible and could be used when indicated, however, erythromycin is resistant and   should not be used.     11/07/2017   Final    Critical Value/Significant Value, preliminary result only, called to and read back by  Katalina BUI at 1555 on 11/9/17 by MIGUEL.     11/07/2017   Final    (Note)  POSITIVE for Staphylococci other than S.aureus, S.epidermidis and  S.lugdunensis, by Verigene multiplex nucleic acid test.  Coagulase-negative staphylococci are the most common venipuncture or  collection associated skin CONTAMINANTS grown in blood cultures.  Final identification and antimicrobial susceptibility testing will be  verified by standard methods.     Specimen tested with Verigene multiplex, gram-positive blood culture  nucleic acid test for  the following targets: Staph aureus, Staph  epidermidis, Staph lugdunensis, other Staph species, Enterococcus  faecalis, Enterococcus faecium, Streptococcus species, S. agalactiae,  S. anginosus grp., S. pneumoniae, S. pyogenes, Listeria sp., mecA  (methicillin resistance) and Ramon/B (vancomycin resistance).    Critical Value/Significant Value called to and read back by Analy Doe RN, @1810 11/9/17.DH.     05/17/2016 No growth  Final   05/10/2016 No growth  Final   05/10/2016 No growth  Final   05/10/2016 No growth  Final   05/10/2016 (A)  Final    On day 2, isolated in broth only: Staphylococcus epidermidis  Critical Value/Significant Value, preliminary result only, called to and read   back by Dr. Marcus Silva on 5/12/16 14:40 CWi  No anaerobes isolated     05/10/2016 Culture negative after 4 weeks  Final   05/10/2016 No Beta Streptococcus isolated  Final   05/09/2016 No growth  Final   05/09/2016 No growth  Final   05/09/2016 No growth  Final   01/26/2016   Final    No Salmonella, Shigella, Campylobacter, E. coli O157, Aeromonas, or Plesiomonas   isolated.     01/18/2016   Final    Culture negative for acid fast bacilli  Assayed at [facility] [address]     01/18/2016   Final    Culture negative for acid fast bacilli  Assayed at [facility] [address]     01/15/2016   Final    The Microbiology Lab does not accept stool specimens for routine culture if the   patient has been hospitalized for 3 days or longer and does not have a   diagnosis of gastroenteritis.     01/13/2016 Test reordered as miscellaneous test on 1/13/16.  Final   01/11/2016   Final    Culture negative for acid fast bacilli  Assayed at [facility] [address]     01/10/2016 No growth after 14 days  Final   01/10/2016 No growth  Final   01/10/2016 No growth  Final   01/09/2016 No growth  Final   01/09/2016 No growth  Final   01/09/2016 No growth  Final   12/25/2015 No  growth  Final   12/25/2015 No growth  Final   07/07/2015 No growth  Final   07/07/2015 No growth  Final   07/07/2015 No growth  Final   04/27/2015 No growth  Final   04/26/2015 No growth  Final   04/26/2015 No growth  Final   04/25/2015 No growth  Final   04/25/2015 No growth  Final   04/18/2015 No growth  Final   04/10/2015 (A)  Final    On day 2, isolated in broth only: Coagulase negative Staphylococcus not isolated   or reported on routine culture  Critical Value/Significant Value, preliminary result only, called to and read   back by Maranda Maldonado RN (4/12/15 @ 3175) JR  On day 14, isolated in broth only: Propionibacterium acnes Susceptibilities done   on aerobic culture.     04/10/2015 Culture negative after 4 weeks  Final   04/10/2015 (A)  Final    On day 4, isolated in broth only: Propionibacterium acnes  Critical Value/Significant Value called to and read back by Nena Rainey Rn   at 1414 on 04.15.15 by luiza  Cefotaxime Testing unavailable due to 's backorder.     12/15/2011 No Beta Streptococcus isolated  Final   01/18/2006 No growth  Final       Urine Studies    Recent Labs   Lab Test  02/04/18   1640  11/07/17   0715  08/11/17   1342  12/29/16   1758  08/24/16   1443   LEUKEST  Negative  Negative  Negative  Negative  Negative   WBCU  2  <1  <1  0  <1       Vancomycin Levels  Recent Labs   Lab Test  04/14/15   0711   VANCOMYCIN  15.4       Serostatus:  No results found for: CMVG, CMIGG, CMIG, CMIM, CMVIGM, CMVM, CMLTX, HSVG1, HSVG2, HSVTP1, VW2274, HS12M, HS12GR, HS1GR, HS2GR, HERPES, HSIM, HSIG, HSIGR, HSVIGMAB, HSVG1, VARICZOSAB, EBVIGG, EBIGG, EBVAGN, OC0250  No results found for: EBIG2, EBIGM, TOXG  No lab results found.    Invalid input(s): HSV12, VZVG, CJW841    Toxoplasma Testing  No lab results found.    Invalid input(s): TOXPL, TOXABM, TOXPCR    Virology:  CMV viral loads    Recent Labs   Lab Test  01/16/16   0701  01/09/16   0550   CSPEC  EDTA PLASMA  EDTA PLASMA     No results  found for: CMQNT, CMVQ  EBV viral loads   No lab results found.  EBV Qualitative PCR   Date Value Ref Range Status   05/10/2016   Final    Not Detected  (Note)  NOT DETECTED - A negative result does not rule out the  presence of PCR inhibitors in the patient specimen or assay  specific nucleic acid in concentrations below the level of  detection by the assay.  INTERPRETIVE INFORMATION:  Hugo Barr Virus by PCR  Test developed and characteristics determined by [facility]. See Compliance Statement A: [employer]/  Performed by [facility],  [address] [place] [address] [phone]  [email or web address], Nayan Corley MD, Lab. Director       EBV PCR Specimen Source   Date Value Ref Range Status   05/10/2016 Cerebrospinal fluid  Final       Hepatitis B Testing Recent Labs   Lab Test  04/13/15   0816   HBCAB  Nonreactive   HEPBANG  Nonreactive   HBCM  Nonreactive   A nonreactive result suggests lack of recent exposure to the virus in the   preceding 6 months.     HBEABY  Negative  Reference range: Negative  (Note)  Performed by [facility],  [address] [place] [address] [phone]  [email or web address], Nayan Corley MD, Lab. Director     HBEAGN  Negative  Reference range: Negative  (Note)  Performed by [facility],  [address] [place] [address] [phone]  www.[email or web address], Nayan Corley MD, Lab. Director       Hepatitis C Testing   Hepatitis C Antibody   Date Value Ref Range Status   03/02/2017  NR Final    Nonreactive   Assay performance characteristics have not been established for newborns,   infants, and children     04/13/2015  NR Final    Nonreactive   Assay performance characteristics have not been established for newborns,   infants, and children       Respiratory Virus Testing    RSV Rapid Antigen Result   Date Value Ref Range Status   01/31/2018 Positive (A) NEG^Negative Final     Comment:     Test results must be correlated with clinical data. If necessary, results    should be confirmed by a molecular assay or viral culture.       Imaging:  Recent Results (from the past 48 hour(s))   CT Abdomen Pelvis w/o Contrast    Narrative    EXAMINATION: CT ABDOMEN PELVIS W/O CONTRAST, 2/10/2018 8:03 PM    TECHNIQUE: Helical CT images from the lung bases through the symphysis  pubis were obtained without IV contrast.     COMPARISON: 2/5/2018, 2/4/2018, 1/31/2018    HISTORY: Worsening drainage and pain in RLQ    FINDINGS:    Abdomen and pelvis:   Stable position of the percutaneous right lower quadrant surgical  drain with substantially decreased size of the intra-abdominal  abscess. Tiny focal residual fluid collection superior to the drain  containing a few tiny foci of extraluminal gas (series 3, image 242).  The adjacent terminal ileum and cecum appears thickened with  surrounding fat stranding which is stable to mildly improved.    The liver, gallbladder, spleen, adrenal glands, pancreas, and kidneys  appear normal. Prominent prostate. No dilated loops of bowel. Normal  caliber abdominal aorta. Several enlarged right lower quadrant lymph  nodes.    Lung bases:   The heart is not enlarged. No pericardial effusion. Tiny hiatal  hernia. Mild dependent bibasilar atelectasis.    Bones and soft tissues:   Unremarkable appearance of the penile prosthesis, including pelvic  reservoir and right scrotal components. Small surrounding hydrocele.  Minimal subcutaneous fat stranding with no fluid collection  surrounding the right lower quadrant drain in the anterior abdominal  wall. No acute or worrisome osseous lesions.        Impression    IMPRESSION:   1. Stable position of the percutaneous right lower quadrant drain with  decreased size of the abdominal abscess.  2. There is residual inflammatory changes in the right lower quadrant,  including fat stranding, reactive lymphadenopathy, and thickening of  the terminal ileal wall.  3. Only mild inflammatory changes in the abdominal wall  surrounding  the right lower quadrant drain.    I have personally reviewed the examination and initial interpretation  and I agree with the findings.    ALFREDA SHIPLEY MD   XR Chest Port 1 View    Narrative    EXAM: XR CHEST PORT 1 VW  2/11/2018 6:36 AM      HISTORY: Evaluate for tension    COMPARISON: Radiograph 12/11/2017. 2/10/2018    FINDINGS: AP radiograph of the chest. Cardiomediastinal silhouette is  stable. Bibasilar hazy opacities. No pneumothorax or pleural  effusions.       Impression    IMPRESSION:   Hazy bibasilar opacities, likely atelectasis. No pneumothorax.    I have personally reviewed the examination and initial interpretation  and I agree with the findings.    ROSELINE CALDWELL MD

## 2018-02-12 NOTE — PLAN OF CARE
Problem: Patient Care Overview  Goal: Plan of Care/Patient Progress Review  Outcome: No Change  Afebrile, VSS, alert and oriented x4.  Roxicodone 10 mg given for abdominal pain x1.  Denies nausea.   and 233.  New order put in for sliding scale insulin.  No output from drain this shift. Tolerating regular diet.  Up with SBA.  Continue plan of care.

## 2018-02-12 NOTE — PROGRESS NOTES
Interventional Radiology Intra-procedural Nursing Note    Patient Name: Andrew Lockwood  Medical Record Number: [id number]  Today's Date: February 12, 2018         Start Time: 1345  End of procedure time: 1355  Procedure: Sinogram of RLQ drain, possible removal of drain  Fire Safety Score: 0    Consent Review/Timeout Performed by: Rene Vasquez PA-C  Procedure Performed By: Rene Vasquez PA-C    Procedure start time: 1345  Procedure end time: 1355    Report given to: Edu RN  Time pt departs:  1355    Other Notes:  Alert male transported via cart from inpatient room to IR Procedure Room 3 for planned intervention.  ID band confirmed and patient acknowledges understanding of planned procedure. Patient repositioned to procedure table via hover-mat and positioned supine.  Patient prepped and draped per policy see VS flowsheet, MAR for further information.       Sinogram performed.  Provider removal of RLQ drain with tip intact.  Site cleansed and dressed.    Patient condition post procedure is stable.   Blood sugar at 1400 is 80.  Inpatient RN caring for patient informed of value.    Patient returned to inpatient room for post-procedure monitoring.    Geovanna Jacobs RN

## 2018-02-12 NOTE — PROCEDURES
Interventional Radiology Brief Post Procedure Note    Procedure: IR SINOGRAM INJECTION DIAGNOSTIC    Proceduralist: Reen Vasquez PA-C    Assistant: None    Time Out: Prior to the start of the procedure and with procedural staff participation, I verbally confirmed the patient s identity using two indicators, relevant allergies, that the procedure was appropriate and matched the consent or emergent situation, and that the correct equipment/implants were available. Immediately prior to starting the procedure I conducted the Time Out with the procedural staff and re-confirmed the patient s name, procedure, and site/side. (The Joint Commission universal protocol was followed.)  Yes    Medications   Medication Event Details Admin User Admin Time   insulin aspart (NovoPen ECHO/NovoLOG) cartridge Medication Not Given Dose: 0.5 Units; Route: Subcutaneous; Reason: Order parameters not met; Scheduled Time: 12:00 PM Nathaniel Stahl RN 2/12/2018  1:31 PM       Sedation: None    Findings: Completed sinogram of existing right lower quadrant drain. Patient has had minimal to no output. Drain was injected and revealed no fistula to bowel. Minimal to no pocket surrounding the drain. Drain was removed without difficulty.     Estimated Blood Loss: None    Fluoroscopy Time:  less than 1 minute(s)    SPECIMENS: None    Complications: 1. None     Condition: Stable    Plan: Follow-up per primary team.     Comments: See dictated procedure note for full details.    Rene Vasquez PA-C

## 2018-02-12 NOTE — PROGRESS NOTES
Date of Service: 2/11/2018  6:59 PM Creation Time: 2/11/2018  6:59 PM         Problem: Patient Care Overview  Goal: Plan of Care/Patient Progress Review  Outcome: No Change  Afebrile, VSS, alert and oriented x4.  Roxicodone 10 mg given for abdominal pain x1.  Denies nausea.   and 165.  Minimal output from drain this shift, dressing changed with small amount of purulent drainage. Tolerating regular diet.  Up in hallway x 2 independently.  Continue plan of care.

## 2018-02-12 NOTE — PROGRESS NOTES
General Surgery Progress Note    Pt doing well overnight. Reports pain is well controlled. Has not yet had a BM yet.    /88 (BP Location: Right arm)  Pulse 78  Temp 98.4  F (36.9  C) (Oral)  Resp 16  Wt 75.4 kg (166 lb 4.8 oz)  SpO2 100%  BMI 27.67 kg/m2    Gen: alert, NAD  Pulm: NLB on RA  Abdomen: tenderness to palpation around RLQ drain site, soft, non-distended    A/P: 51 yo M w/ hx of lap appy with subsequent abscess s/p RLQ drain placement readmitted for pain. Doing well.     - IR to do sinogram today with possible drain removal  - Seeking to wean off narcotics  - Continue blood glucose control  - Continue home meds  - Regular diet  - Aggressive bowel regimen  - Will stay on abx while in hospital, will not be discharged with them  - Plan for d/c tomorrow    Kevin Santana MD  PGY-1 Surgery

## 2018-02-12 NOTE — CONSULTS
Patient is on IR schedule 2/12/2018 for a sinogram of RLQ drain with possible removal.   Labs WNL for procedure.    No NPO required.  Consent will be done prior to procedure.     Please contact the IR charge RN at [phone] for estimated time of procedure.     Case discussed with Dr. Flores from IR and Dr. Santana.    Vanessa Ferrara, JEFF, APRN  Interventional Radiology  Phone: [phone]  Pager: [phone]

## 2018-02-13 VITALS
BODY MASS INDEX: 27.67 KG/M2 | TEMPERATURE: 98.4 F | HEART RATE: 78 BPM | OXYGEN SATURATION: 99 % | DIASTOLIC BLOOD PRESSURE: 83 MMHG | SYSTOLIC BLOOD PRESSURE: 111 MMHG | WEIGHT: 166.3 LBS | RESPIRATION RATE: 16 BRPM

## 2018-02-13 LAB
GLUCOSE BLDC GLUCOMTR-MCNC: 146 MG/DL (ref 70–99)
GLUCOSE BLDC GLUCOMTR-MCNC: 184 MG/DL (ref 70–99)
GLUCOSE BLDC GLUCOMTR-MCNC: 219 MG/DL (ref 70–99)

## 2018-02-13 PROCEDURE — 25000125 ZZHC RX 250: Performed by: STUDENT IN AN ORGANIZED HEALTH CARE EDUCATION/TRAINING PROGRAM

## 2018-02-13 PROCEDURE — 27210995 ZZH RX 272: Performed by: STUDENT IN AN ORGANIZED HEALTH CARE EDUCATION/TRAINING PROGRAM

## 2018-02-13 PROCEDURE — 00000146 ZZHCL STATISTIC GLUCOSE BY METER IP

## 2018-02-13 PROCEDURE — 25000128 H RX IP 250 OP 636: Performed by: STUDENT IN AN ORGANIZED HEALTH CARE EDUCATION/TRAINING PROGRAM

## 2018-02-13 PROCEDURE — 25000132 ZZH RX MED GY IP 250 OP 250 PS 637: Performed by: STUDENT IN AN ORGANIZED HEALTH CARE EDUCATION/TRAINING PROGRAM

## 2018-02-13 RX ADMIN — METRONIDAZOLE 500 MG: 500 INJECTION, SOLUTION INTRAVENOUS at 05:27

## 2018-02-13 RX ADMIN — ACETAMINOPHEN 650 MG: 325 TABLET, FILM COATED ORAL at 11:46

## 2018-02-13 RX ADMIN — ACETAMINOPHEN 650 MG: 325 TABLET, FILM COATED ORAL at 02:41

## 2018-02-13 RX ADMIN — ACETAMINOPHEN 650 MG: 325 TABLET, FILM COATED ORAL at 06:25

## 2018-02-13 RX ADMIN — CEFOXITIN SODIUM 2 G: 2 POWDER, FOR SOLUTION INTRAVENOUS at 11:46

## 2018-02-13 RX ADMIN — Medication 5 MG: at 08:58

## 2018-02-13 RX ADMIN — ELVITEGRAVIR, COBICISTAT, EMTRICITABINE, AND TENOFOVIR ALAFENAMIDE 1 TABLET: 150; 150; 200; 10 TABLET ORAL at 08:58

## 2018-02-13 RX ADMIN — POLYETHYLENE GLYCOL 3350 17 G: 17 POWDER, FOR SOLUTION ORAL at 08:58

## 2018-02-13 RX ADMIN — GABAPENTIN 300 MG: 300 CAPSULE ORAL at 16:31

## 2018-02-13 RX ADMIN — GABAPENTIN 300 MG: 300 CAPSULE ORAL at 08:58

## 2018-02-13 RX ADMIN — SENNOSIDES AND DOCUSATE SODIUM 1 TABLET: 8.6; 5 TABLET ORAL at 08:58

## 2018-02-13 RX ADMIN — INSULIN ASPART 0.5 UNITS: 100 INJECTION, SOLUTION INTRAVENOUS; SUBCUTANEOUS at 16:31

## 2018-02-13 RX ADMIN — INSULIN ASPART 0.5 UNITS: 100 INJECTION, SOLUTION INTRAVENOUS; SUBCUTANEOUS at 08:59

## 2018-02-13 RX ADMIN — CEFOXITIN SODIUM 2 G: 2 POWDER, FOR SOLUTION INTRAVENOUS at 02:42

## 2018-02-13 RX ADMIN — ACETAMINOPHEN 650 MG: 325 TABLET, FILM COATED ORAL at 16:30

## 2018-02-13 NOTE — PROGRESS NOTES
Patient alert, oriented, sleeping most of the night, denies any pain, claimed tired and just wants to sleep. Blood sugar at 10 PM was 180, no Insulin coverage given< rechecked at 2 AM was 219, 0.5 units of Insulin given. Pt had BM last night, up to the bathroom to void. Dressing at right lower quadrant of abdomen with small yellowish discharge, site tender to touch. Pt tolerating food and fluids. VSS

## 2018-02-13 NOTE — PROGRESS NOTES
Pt has been off unit for roughly 75 minutes without contacting the nurses. He did not notify anyone of where he was going or if he was coming back.  called and overhead speaker system message was announced for him to return to the unit.

## 2018-02-13 NOTE — PROGRESS NOTES
Pt returned to unit stating he was looking for a bed at a shelter but said no shelters have any space. Will consult with Xin GUPTA

## 2018-02-13 NOTE — PROGRESS NOTES
/84 (BP Location: Left arm)  Pulse 78  Temp 98.1  F (36.7  C) (Oral)  Resp 18  Wt 75.4 kg (166 lb 4.8 oz)  SpO2 97%  BMI 27.67 kg/m2    Patient BG was 52 at 1310. Apple juice and peanut butter on toast given to patient.  at 1320. Patient went down to IR for drain removal. Patient tolerated procedure well. BG when patient returned was 80. Patient ordered dinner early. Insuline was held. Patient's BG post meal was 163. Dressing on drain site has small amount of drainage. Patient reports site is tender to touch.  Patient ambulated in cardenas. Patient tolerating PO intake.

## 2018-02-13 NOTE — PLAN OF CARE
Pt alert and oriented, VSS. Pt denies pain at drain removal site. Dressing changed at 0900. Old dressing had small amount of serosanguinous and slightly yellow drainage, no redness or fever to indicate infection. Pt ambulates independently. 0845 BG of 146 was corrected with 0.5 units of insulin prior to breakfast. Pt tolerating food and liquid intake. Last BM was last night. No difficulty voiding. Plan to discharge early afternoon.

## 2018-02-14 ENCOUNTER — CARE COORDINATION (OUTPATIENT)
Dept: SURGERY | Facility: CLINIC | Age: 53
End: 2018-02-14

## 2018-02-14 NOTE — PROGRESS NOTES
Andrew Lockwood is a patient that was discharged from the hospital yesterday after recently undergoing an appendectomy and drain removal. Patient does not have a telephone so unable to reach him for post discharge call.  Per  note dated 2/7, patient prefers to be contacted via email [email or web address] - he checks mail at the library.     Of note:  IR drain removed prior to discharge.  Post op appointment arranged with Dr. Holt 2/21/18 at 10 AM [facility] ([address] [place] [address], 1st Floor, Endoscopy area)  New Medications:  Tylenol     Email sent to patient with clinic contact information and appointment reminder (Dr. Holt).

## 2018-02-15 ENCOUNTER — HOSPITAL ENCOUNTER (EMERGENCY)
Facility: CLINIC | Age: 53
Discharge: HOME | End: 2018-02-16
Attending: EMERGENCY MEDICINE | Admitting: EMERGENCY MEDICINE
Payer: COMMERCIAL

## 2018-02-15 DIAGNOSIS — E11.8 TYPE 2 DIABETES MELLITUS WITH COMPLICATION, WITHOUT LONG-TERM CURRENT USE OF INSULIN (H): ICD-10-CM

## 2018-02-15 DIAGNOSIS — R00.2 PALPITATIONS: ICD-10-CM

## 2018-02-15 DIAGNOSIS — B02.9 HERPES ZOSTER WITHOUT COMPLICATION: ICD-10-CM

## 2018-02-15 LAB
ANION GAP SERPL CALCULATED.3IONS-SCNC: 9 MMOL/L (ref 3–14)
BASOPHILS # BLD AUTO: 0 10E9/L (ref 0–0.2)
BASOPHILS NFR BLD AUTO: 0.6 %
BUN SERPL-MCNC: 19 MG/DL (ref 7–30)
CALCIUM SERPL-MCNC: 9.3 MG/DL (ref 8.5–10.1)
CHLORIDE SERPL-SCNC: 107 MMOL/L (ref 94–109)
CO2 SERPL-SCNC: 25 MMOL/L (ref 20–32)
CREAT SERPL-MCNC: 0.69 MG/DL (ref 0.66–1.25)
DIFFERENTIAL METHOD BLD: ABNORMAL
EOSINOPHIL # BLD AUTO: 0.1 10E9/L (ref 0–0.7)
EOSINOPHIL NFR BLD AUTO: 1.6 %
ERYTHROCYTE [DISTWIDTH] IN BLOOD BY AUTOMATED COUNT: 13.8 % (ref 10–15)
GFR SERPL CREATININE-BSD FRML MDRD: >90 ML/MIN/1.7M2
GLUCOSE BLDC GLUCOMTR-MCNC: 189 MG/DL (ref 70–99)
GLUCOSE SERPL-MCNC: 124 MG/DL (ref 70–99)
HCT VFR BLD AUTO: 40.7 % (ref 40–53)
HGB BLD-MCNC: 13.6 G/DL (ref 13.3–17.7)
IMM GRANULOCYTES # BLD: 0.2 10E9/L (ref 0–0.4)
IMM GRANULOCYTES NFR BLD: 3.2 %
LYMPHOCYTES # BLD AUTO: 2.3 10E9/L (ref 0.8–5.3)
LYMPHOCYTES NFR BLD AUTO: 31.9 %
MAGNESIUM SERPL-MCNC: 2.1 MG/DL (ref 1.6–2.3)
MCH RBC QN AUTO: 32.3 PG (ref 26.5–33)
MCHC RBC AUTO-ENTMCNC: 33.4 G/DL (ref 31.5–36.5)
MCV RBC AUTO: 97 FL (ref 78–100)
MONOCYTES # BLD AUTO: 0.7 10E9/L (ref 0–1.3)
MONOCYTES NFR BLD AUTO: 9.4 %
NEUTROPHILS # BLD AUTO: 3.8 10E9/L (ref 1.6–8.3)
NEUTROPHILS NFR BLD AUTO: 53.3 %
NRBC # BLD AUTO: 0 10*3/UL
NRBC BLD AUTO-RTO: 0 /100
PLATELET # BLD AUTO: 504 10E9/L (ref 150–450)
POTASSIUM SERPL-SCNC: 3.8 MMOL/L (ref 3.4–5.3)
RBC # BLD AUTO: 4.21 10E12/L (ref 4.4–5.9)
SODIUM SERPL-SCNC: 141 MMOL/L (ref 133–144)
TROPONIN I SERPL-MCNC: <0.015 UG/L (ref 0–0.04)
TSH SERPL DL<=0.005 MIU/L-ACNC: 3.57 MU/L (ref 0.4–4)
WBC # BLD AUTO: 7.1 10E9/L (ref 4–11)

## 2018-02-15 PROCEDURE — 96374 THER/PROPH/DIAG INJ IV PUSH: CPT | Performed by: EMERGENCY MEDICINE

## 2018-02-15 PROCEDURE — 80048 BASIC METABOLIC PNL TOTAL CA: CPT | Performed by: EMERGENCY MEDICINE

## 2018-02-15 PROCEDURE — 93005 ELECTROCARDIOGRAM TRACING: CPT | Performed by: EMERGENCY MEDICINE

## 2018-02-15 PROCEDURE — 25000132 ZZH RX MED GY IP 250 OP 250 PS 637: Performed by: EMERGENCY MEDICINE

## 2018-02-15 PROCEDURE — 85025 COMPLETE CBC W/AUTO DIFF WBC: CPT | Performed by: EMERGENCY MEDICINE

## 2018-02-15 PROCEDURE — 99285 EMERGENCY DEPT VISIT HI MDM: CPT | Mod: 25

## 2018-02-15 PROCEDURE — 84484 ASSAY OF TROPONIN QUANT: CPT | Performed by: EMERGENCY MEDICINE

## 2018-02-15 PROCEDURE — 25000125 ZZHC RX 250: Performed by: EMERGENCY MEDICINE

## 2018-02-15 PROCEDURE — 93010 ELECTROCARDIOGRAM REPORT: CPT | Mod: Z6 | Performed by: EMERGENCY MEDICINE

## 2018-02-15 PROCEDURE — 00000146 ZZHCL STATISTIC GLUCOSE BY METER IP

## 2018-02-15 PROCEDURE — 99285 EMERGENCY DEPT VISIT HI MDM: CPT | Mod: 25 | Performed by: EMERGENCY MEDICINE

## 2018-02-15 PROCEDURE — 84443 ASSAY THYROID STIM HORMONE: CPT | Performed by: EMERGENCY MEDICINE

## 2018-02-15 PROCEDURE — 96361 HYDRATE IV INFUSION ADD-ON: CPT | Performed by: EMERGENCY MEDICINE

## 2018-02-15 PROCEDURE — 25000128 H RX IP 250 OP 636: Performed by: EMERGENCY MEDICINE

## 2018-02-15 PROCEDURE — 83735 ASSAY OF MAGNESIUM: CPT | Performed by: EMERGENCY MEDICINE

## 2018-02-15 RX ORDER — HYDROMORPHONE HYDROCHLORIDE 1 MG/ML
0.5 INJECTION, SOLUTION INTRAMUSCULAR; INTRAVENOUS; SUBCUTANEOUS ONCE
Status: COMPLETED | OUTPATIENT
Start: 2018-02-15 | End: 2018-02-15

## 2018-02-15 RX ADMIN — SODIUM CHLORIDE 1000 ML: 9 INJECTION, SOLUTION INTRAVENOUS at 22:47

## 2018-02-15 RX ADMIN — LIDOCAINE HYDROCHLORIDE 30 ML: 20 SOLUTION ORAL; TOPICAL at 23:19

## 2018-02-15 RX ADMIN — Medication 0.5 MG: at 22:57

## 2018-02-15 NOTE — ED AVS SNAPSHOT
" [facility], Emergency Department    [address] [facility] [address]    Phone:  [phone]                                       Andrew Lockwood   MRN: [id number]    Department:  [facility], Emergency Department   Date of Visit:  2/15/2018           Patient Information     Date Of Birth          11/27/1967        Your diagnoses for this visit were:     Palpitations     Type 2 diabetes mellitus with complication, without long-term current use of insulin (H)     Herpes zoster without complication        You were seen by Kofi Esparza MD.      Follow-up Information     Follow up with Sharon Rosado MD.    Specialty:  Infectious Diseases    Contact information:    [address] [facility] [address]  [facility] [address] [phone]          Discharge Instructions         * Heart Palpitations    Palpitations refers to the feeling that your heart is beating hard, fast or irregular. Some people describe it as \"pounding\" or \"skipped beats\". Palpitations may occur in persons with heart disease, but can also occur in healthy persons. Your doctor does not believe that anything dangerous is causing your symptoms at this time.  Heart-Related Causes:    Arrhythmia (a change from the heart's normal rhythm)    Disease of the heart valves  Non-Heart-Related Causes:    Certain medicines (such as asthma inhalers and decongestants)    Some herbal supplements, energy drinks and pills, and weight loss pills    Illegal stimulant drugs (such as cocaine, crank, methamphetamine, PCP)    Caffeine, alcohol and tobacco    Medical conditions such as thyroid disease, anemia, anxiety and panic disorder  Sometimes the cause cannot be found.  Home Care:  1. Avoid excess caffeine, alcohol, tobacco and any stimulant drugs.  2. Tell your doctor about any prescription or over-the-counter or herbal medicines you take.  Follow Up  with your doctor or as advised by our staff.  Get Prompt Medical Attention  if any of the " following occur together with palpitations:    Weakness, dizziness, light-headed or fainting    Chest pain or shortness of breath    Rapid heart rate (over 120 beats per minute, at rest)    Palpitations that lasts over 20 minutes    Weakness of an arm or leg or one side of the face    Difficulty with speech or vision    [id number] [employer]. [address] [place] [address]. All rights reserved. This information is not intended as a substitute for professional medical care. Always follow your healthcare professional's instructions.  This information has been modified by your health care provider with permission from the publisher.          Future Appointments        Provider Department Dept Phone Center    2/20/2018 3:20 PM Aguila Ingram MD [facility] Orthopaedic Clinic [phone] [facility]    2/21/2018 10:00 AM SOC PROVIDER [facility] Surgical Care Outpatient [phone] [facility]    3/9/2018 10:30 AM Evin Zamorano MD [facility] Orthopaedic [facility] [phone] [facility]    4/6/2018 8:30 AM Jose Ramon Singh MD [facility] Dermatology [phone] [facility]      24 Hour Appointment Hotline       To make an appointment at any [facility], call [phone] ([phone]). If you don't have a family doctor or clinic, we will help you find one. [facility] ([facility] are conveniently located to serve the needs of you and your family.             Review of your medicines      START taking        Dose / Directions Last dose taken    sulfamethoxazole-trimethoprim 800-160 MG per tablet   Commonly known as:  BACTRIM DS   Dose:  1 tablet   Quantity:  6 tablet        Take 1 tablet by mouth 2 times daily for 3 days   Refills:  0          Our records show that you are taking the medicines listed below. If these are incorrect, please call your family doctor or clinic.        Dose / Directions Last dose taken    * acetaminophen 325 MG tablet   Commonly known as:  TYLENOL   Dose:  650 mg   Quantity:  100  tablet        Take 2 tablets (650 mg) by mouth every 6 hours   Refills:  0        * acetaminophen 325 MG tablet   Commonly known as:  TYLENOL   Dose:  650 mg   Quantity:  100 tablet        Take 2 tablets (650 mg) by mouth every 4 hours   Refills:  1        alum & mag hydroxide-simethicone 400-400-40 MG/5ML Susp suspension   Commonly known as:  MAALOX ADVANCED MAX ST   Dose:  30 mL   Quantity:  355 mL        Take 30 mLs by mouth every 4 hours as needed for indigestion   Refills:  0        blood glucose lancets standard   Commonly known as:  no brand specified   Quantity:  100 Box        Use to test blood sugar four times daily or as directed.   Refills:  5        * blood glucose monitoring meter device kit   Commonly known as:  no brand specified   Quantity:  1 kit        Use to test blood sugar four times daily or as directed.   Refills:  0        * blood glucose monitoring meter device kit   Commonly known as:  no brand specified   Quantity:  1 kit        Use to test blood sugar 4 times daily or as directed.   Refills:  0        * blood glucose monitoring test strip   Commonly known as:  no brand specified   Quantity:  100 each        Use to test blood sugars four times daily or as directed   Refills:  5        * blood glucose monitoring test strip   Commonly known as:  no brand specified   Dose:  1 strip   Quantity:  100 strip        1 strip by In Vitro route 4 times daily (before meals and nightly) Use to test blood sugars 4 times daily or as directed   Refills:  11        Arrkxzz-Bwuqi-Kujdcrzp-TenofAF [phone] MG Tabs per tablet   Commonly known as:  GENVOYA   Dose:  1 tablet   Quantity:  3 tablet        Take 1 tablet by mouth daily for 3 doses   Refills:  0        gabapentin 300 MG capsule   Commonly known as:  NEURONTIN   Dose:  300 mg   Quantity:  9 capsule        Take 1 capsule (300 mg) by mouth 3 times daily for 3 days   Refills:  0        glipiZIDE 5 MG tablet   Commonly known as:  GLUCOTROL   Dose:   2.5-5 mg   Quantity:  3 tablet        Take 0.5-1 tablets (2.5-5 mg) by mouth daily for 3 days   Refills:  0        * oxyCODONE IR 5 MG tablet   Commonly known as:  ROXICODONE   Dose:  5 mg   Quantity:  15 tablet        Take 1 tablet (5 mg) by mouth every 4 hours as needed for pain maximum 6 tablet(s) per day   Refills:  0        * oxyCODONE IR 5 MG tablet   Commonly known as:  ROXICODONE   Dose:  5-10 mg   Quantity:  10 tablet        Take 1-2 tablets (5-10 mg) by mouth every 4 hours as needed for other (pain control or improvement in physical function. Hold dose for analgesic side effects.)   Refills:  0        * polyethylene glycol Packet   Commonly known as:  MIRALAX/GLYCOLAX   Dose:  17 g   Quantity:  10 packet        Take 17 g by mouth daily as needed for constipation   Refills:  0        * polyethylene glycol Packet   Commonly known as:  MIRALAX/GLYCOLAX   Dose:  17 g   Quantity:  24 packet        Take 17 g by mouth 2 times daily   Refills:  0        senna-docusate 8.6-50 MG per tablet   Commonly known as:  SENOKOT-S;PERICOLACE   Dose:  1 tablet   Quantity:  50 tablet        Take 1 tablet by mouth 2 times daily as needed for constipation   Refills:  0        sodium chloride (PF) 0.9% PF flush   Quantity:  420 mL        Flush abdominal drain three times per day as instructed.   Refills:  0        * Notice:  This list has 10 medication(s) that are the same as other medications prescribed for you. Read the directions carefully, and ask your doctor or other care provider to review them with you.      ASK your doctor about these medications        Dose / Directions Last dose taken    ibuprofen 200 MG tablet   Commonly known as:  ADVIL/MOTRIN   Dose:  200-400 mg   Quantity:  40 tablet   Ask about: Should I take this medication?        Take 1-2 tablets (200-400 mg) by mouth every 6 hours as needed for moderate pain   Refills:  0                Prescriptions were sent or printed at these locations (4 Prescriptions)                    Other Prescriptions                Printed at Department/Unit printer (4 of 4)         GENVOYA [phone] mg tablet               glipiZIDE (GLUCOTROL) 5 MG tablet               gabapentin (NEURONTIN) 300 MG capsule               sulfamethoxazole-trimethoprim (BACTRIM DS) 800-160 MG per tablet                Procedures and tests performed during your visit     Procedure/Test Number of Times Performed    Basic metabolic panel 1    CBC with platelets differential 1    EKG 12-lead, tracing only 1    Glucose by meter 2    Magnesium 1    TSH 1    Troponin I 1      Orders Needing Specimen Collection     Ordered          02/15/18 2132  UA reflex to Microscopic and Culture - STAT, [facility]: STAT, Needs to be Collected     Scheduled Task Status   02/15/18 2132 Collect UA reflex to Microscopic and Culture Open   Order Class:  PCU Collect                  Pending Results     Date and Time Order Name Status Description    2/15/2018 2132 EKG 12-lead, tracing only Preliminary             Pending Culture Results     No orders found for last 3 day(s).            Pending Results Instructions     If you had any lab results that were not finalized at the time of your Discharge, you can call the ED Lab Result RN at [phone]. You will be contacted by this team for any positive Lab results or changes in treatment. The nurses are available 7 days a week from 10A to 6:30P.  You can leave a message 24 hours per day and they will return your call.        Thank you for choosing [facility]       Thank you for choosing [facility] for your care. Our goal is always to provide you with excellent care. Hearing back from our patients is one way we can continue to improve our services. Please take a few minutes to complete the written survey that you may receive in the mail after you visit with us. Thank you!        [employer] Information     [facility] gives you secure access to your electronic health record. If you see a primary care  provider, you can also send messages to your care team and make appointments. If you have questions, please call your primary care clinic.  If you do not have a primary care provider, please call [phone] and they will assist you.        Care EveryWhere ID     This is your Care EveryWhere ID. This could be used by other organizations to access your [place] medical records  [id number]        Equal Access to Services     RA JORDAN : Apollo Cody, maverick centeno, luis enrique reyna, dez salazar. So [facility] [phone].    ATENCIÓN: Si habla español, tiene a kohli disposición servicios gratuitos de asistencia lingüística. Llame al [phone].    We comply with applicable federal civil rights laws and [place] laws. We do not discriminate on the basis of race, color, national origin, age, disability, sex, sexual orientation, or gender identity.            After Visit Summary       This is your record. Keep this with you and show to your community pharmacist(s) and doctor(s) at your next visit.

## 2018-02-15 NOTE — ED AVS SNAPSHOT
[facility], [place], Emergency Department    [address] [place] [address]    Phone:  [phone]                                       Andrew Lockwood   MRN: [id number]    Department:  [facility], Emergency Department   Date of Visit:  2/15/2018           After Visit Summary Signature Page     I have received my discharge instructions, and my questions have been answered. I have discussed any challenges I see with this plan with the nurse or doctor.    ..........................................................................................................................................  Patient/Patient Representative Signature      ..........................................................................................................................................  Patient Representative Print Name and Relationship to Patient    ..................................................               ................................................  Date                                            Time    ..........................................................................................................................................  Reviewed by Signature/Title    ...................................................              ..............................................  Date                                                            Time

## 2018-02-16 ENCOUNTER — OFFICE VISIT (OUTPATIENT)
Dept: ENDOCRINOLOGY | Facility: CLINIC | Age: 53
End: 2018-02-16
Payer: MEDICAID

## 2018-02-16 ENCOUNTER — TELEPHONE (OUTPATIENT)
Dept: ENDOCRINOLOGY | Facility: CLINIC | Age: 53
End: 2018-02-16

## 2018-02-16 ENCOUNTER — OFFICE VISIT (OUTPATIENT)
Dept: INTERNAL MEDICINE | Facility: CLINIC | Age: 53
End: 2018-02-16
Payer: MEDICAID

## 2018-02-16 VITALS
OXYGEN SATURATION: 99 % | SYSTOLIC BLOOD PRESSURE: 116 MMHG | BODY MASS INDEX: 28.26 KG/M2 | HEART RATE: 105 BPM | WEIGHT: 169.8 LBS | DIASTOLIC BLOOD PRESSURE: 84 MMHG

## 2018-02-16 VITALS
SYSTOLIC BLOOD PRESSURE: 116 MMHG | DIASTOLIC BLOOD PRESSURE: 84 MMHG | BODY MASS INDEX: 28.12 KG/M2 | HEART RATE: 105 BPM | WEIGHT: 169 LBS

## 2018-02-16 VITALS
HEART RATE: 88 BPM | DIASTOLIC BLOOD PRESSURE: 84 MMHG | SYSTOLIC BLOOD PRESSURE: 117 MMHG | RESPIRATION RATE: 16 BRPM | WEIGHT: 167.55 LBS | BODY MASS INDEX: 27.88 KG/M2 | OXYGEN SATURATION: 100 % | TEMPERATURE: 97.7 F

## 2018-02-16 DIAGNOSIS — E11.9 TYPE 2 DIABETES MELLITUS WITHOUT COMPLICATION, WITHOUT LONG-TERM CURRENT USE OF INSULIN (H): Primary | ICD-10-CM

## 2018-02-16 DIAGNOSIS — Z13.5 SCREENING FOR DIABETIC RETINOPATHY: ICD-10-CM

## 2018-02-16 DIAGNOSIS — E11.9 TYPE 2 DIABETES MELLITUS WITHOUT COMPLICATION, WITHOUT LONG-TERM CURRENT USE OF INSULIN (H): ICD-10-CM

## 2018-02-16 LAB
GLUCOSE BLDC GLUCOMTR-MCNC: 177 MG/DL (ref 70–99)
GLUCOSE SERPL-MCNC: 151 MG/DL (ref 70–99)
HBA1C MFR BLD: 7.5 % (ref 4.3–6)
INTERPRETATION ECG - MUSE: NORMAL

## 2018-02-16 PROCEDURE — 25000132 ZZH RX MED GY IP 250 OP 250 PS 637: Performed by: EMERGENCY MEDICINE

## 2018-02-16 PROCEDURE — 00000146 ZZHCL STATISTIC GLUCOSE BY METER IP

## 2018-02-16 RX ORDER — GLIPIZIDE 5 MG/1
2.5-5 TABLET ORAL DAILY
Qty: 3 TABLET | Refills: 0 | Status: SHIPPED | OUTPATIENT
Start: 2018-02-16 | End: 2018-02-22

## 2018-02-16 RX ORDER — SULFAMETHOXAZOLE/TRIMETHOPRIM 800-160 MG
1 TABLET ORAL 2 TIMES DAILY
Qty: 6 TABLET | Refills: 0 | Status: SHIPPED | OUTPATIENT
Start: 2018-02-16 | End: 2018-02-16

## 2018-02-16 RX ORDER — LIRAGLUTIDE 6 MG/ML
INJECTION SUBCUTANEOUS
Qty: 9 ML | Refills: 0 | Status: SHIPPED | OUTPATIENT
Start: 2018-02-16 | End: 2018-02-18

## 2018-02-16 RX ORDER — GABAPENTIN 300 MG/1
300 CAPSULE ORAL 3 TIMES DAILY
Qty: 9 CAPSULE | Refills: 0 | Status: ON HOLD | OUTPATIENT
Start: 2018-02-16 | End: 2018-03-07

## 2018-02-16 RX ORDER — GLIPIZIDE 5 MG/1
5 TABLET ORAL ONCE
Status: COMPLETED | OUTPATIENT
Start: 2018-02-16 | End: 2018-02-16

## 2018-02-16 RX ADMIN — GLIPIZIDE 5 MG: 5 TABLET ORAL at 01:56

## 2018-02-16 ASSESSMENT — PAIN SCALES - GENERAL
PAINLEVEL: NO PAIN (0)
PAINLEVEL: SEVERE PAIN (7)

## 2018-02-16 ASSESSMENT — ENCOUNTER SYMPTOMS
VOMITING: 0
CONSTIPATION: 0
FATIGUE: 0
ABDOMINAL PAIN: 0
CHILLS: 0
FEVER: 0
DIARRHEA: 0
SHORTNESS OF BREATH: 0
DYSURIA: 0
PALPITATIONS: 1

## 2018-02-16 NOTE — PATIENT INSTRUCTIONS
[facility]: [phone]     [facility] Medication Refill Request Information:  * Please contact your pharmacy regarding ANY request for medication refills.  ** [facility] Prescription Fax = [phone]  * Please allow 3 business days for routine medication refills.  * Please allow 5 business days for controlled substance medication refills.     [facility] Test Result notification information:  *You will be notified with in 7-10 days of your appointment day regarding the results of your test.  If you are on MyChart you will be notified as soon as the provider has reviewed the results and signed off on them.

## 2018-02-16 NOTE — MR AVS SNAPSHOT
After Visit Summary   2/16/2018    Andrew Lockwood    MRN: [id number]           Patient Information     Date Of Birth          11/27/1967        Visit Information        Provider Department      2/16/2018 1:40 PM Russ Ponce MD [facility] Endocrinology        Today's Diagnoses     Type 2 diabetes mellitus without complication, without long-term current use of insulin (H)           Follow-ups after your visit        Follow-up notes from your care team     Return in about 4 weeks (around 3/16/2018).      Your next 10 appointments already scheduled     Feb 20, 2018  1:00 PM CST   (Arrive by 12:45 PM)   NEW GENERAL with Hernando White OD   [facility] Ophthalmology ([facility] [place])    [address] [place] [id number]   [phone]            Feb 20, 2018  3:20 PM CST   (Arrive by 3:05 PM)   Return Visit with Aguila Ingram MD   [facility] Orthopaedic Clinic ([facility] [place])    [address] [place] [address]   [phone]            Feb 21, 2018 10:00 AM CST   Post-Op with P SOC PROVIDER   [facility] Surgical Care Outpatient ([facility])    [address] [facility] [address]   [phone]            Feb 27, 2018  1:05 PM CST   (Arrive by 12:50 PM)   RE-ESTABLISH NEW with Rubio Cronin MD   [facility] Primary Care Clinic ([facility] [place])    [address] [place] [id number]   [phone]            Mar 09, 2018 10:30 AM CST   (Arrive by 10:15 AM)   RETURN HAND with Evin Zamorano MD   [facility]opaedic [facility] ([facility] [place])    [address] [place] [address]   [phone]            Mar 14, 2018  8:40 AM CDT   (Arrive by 8:25 AM)   RETURN DIABETES with Russ Ponce MD   [facility] Endocrinology ([facility] [place])    [address] [place] [address]  [id number] [phone]            Apr 06, 2018  8:30 AM CDT   (Arrive by 8:15 AM)   Return Visit with Jose Ramon Singh MD   [facility] Dermatology ([facility] [place])    [address] [id number] [phone]              Who to contact     Please call your clinic at [phone] to:    Ask questions about your health    Make or cancel appointments    Discuss your medicines    Learn about your test results    Speak to your doctor            Additional Information About Your Visit        Server[employer] Information     [employer] gives you secure access to your electronic health record. If you see a primary care provider, you can also send messages to your care team and make appointments. If you have questions, please call your primary care clinic.  If you do not have a primary care provider, please call [phone] and they will assist you.      [employer] is an electronic gateway that provides easy, online access to your medical records. With [employer], you can request a clinic appointment, read your test results, renew a prescription or communicate with your care team.     To access your existing account, please contact your [facility] or call [phone] for assistance.        Care EveryWhere ID     This is your Care EveryWhere ID. This could be used by other organizations to access your [facility] medical records  [id number]        Your Vitals Were     Pulse BMI (Body Mass Index)                105 28.12 kg/m2           Blood Pressure from Last 3 Encounters:   02/16/18 116/84   02/16/18 116/84   02/16/18 117/84    Weight from Last 3 Encounters:   02/16/18 76.7 kg (169 lb)   02/16/18 77 kg (169 lb 12.8 oz)   02/15/18 76 kg (167 lb 8.8 oz)              We Performed the Following     ENDOCRINOLOGY ADULT REFERRAL          Today's Medication Changes          These changes are accurate as of 2/16/18  1:59 PM.  If you have any questions, ask your  nurse or doctor.               Start taking these medicines.        Dose/Directions    insulin pen needle 31G X 8 MM   Commonly known as:  B-D U/F   Used for:  Type 2 diabetes mellitus without complication, without long-term current use of insulin (H)   Started by:  Russ Ponce MD        Use 1 pen needles daily or as directed.   Quantity:  100 each   Refills:  0       liraglutide 18 MG/3ML soln   Commonly known as:  VICTOZA   Used for:  Type 2 diabetes mellitus without complication, without long-term current use of insulin (H)   Started by:  Russ Ponce MD        Week 1: 0.6 mg subcutaneously daily. Week 2: 1.2 mg subcutaneously daily. Week 3 and after: 1.8 mg subcutaneously daily   Quantity:  9 mL   Refills:  0         Stop taking these medicines if you haven't already. Please contact your care team if you have questions.     sulfamethoxazole-trimethoprim 800-160 MG per tablet   Commonly known as:  BACTRIM DS   Stopped by:  Rekha Membreno MD                Where to get your medicines      These medications were sent to [facility] [address] [facility] [phone]  [address] [facility] [address] [place], [place] [id number]    Hours:  TRANSPLANT PHONE NUMBER [phone] Phone:  [phone]     insulin pen needle 31G X 8 MM    liraglutide 18 MG/3ML soln                Primary Care Provider Office Phone # Fax #    Sharon Carrie Rosado [phone]       [address] [place] [address] [address] [facility] [address]  [facility] [address]        Equal Access to Services     RA JORDAN AH: Hadii aad ku hadasho Soomaali, waaxda luqadaha, qaybta kaalmada adeegyada, waxay ernestoin haytheresen tristin salazar. So [facility] [phone].    ATENCIÓN: Si habla español, tiene a kohli disposición servicios gratuitos de asistencia lingüística. Llame al [phone].    We comply with applicable federal civil rights laws and [place] laws. We do not discriminate on the basis of race, color, national  origin, age, disability, sex, sexual orientation, or gender identity.            Thank you!     Thank you for choosing [facility] ENDOCRINOLOGY  for your care. Our goal is always to provide you with excellent care. Hearing back from our patients is one way we can continue to improve our services. Please take a few minutes to complete the written survey that you may receive in the mail after your visit with us. Thank you!             Your Updated Medication List - Protect others around you: Learn how to safely use, store and throw away your medicines at www.disposemymeds.org.          This list is accurate as of 2/16/18  1:59 PM.  Always use your most recent med list.                   Brand Name Dispense Instructions for use Diagnosis    * acetaminophen 325 MG tablet    TYLENOL    100 tablet    Take 2 tablets (650 mg) by mouth every 6 hours    Abdominal abscess (H)       * acetaminophen 325 MG tablet    TYLENOL    100 tablet    Take 2 tablets (650 mg) by mouth every 4 hours    Abdominal abscess (H)       alum & mag hydroxide-simethicone 400-400-40 MG/5ML Susp suspension    MAALOX ADVANCED MAX ST    355 mL    Take 30 mLs by mouth every 4 hours as needed for indigestion        blood glucose lancets standard    no brand specified    100 Box    Use to test blood sugar four times daily or as directed.    Type 2 diabetes mellitus without complication, without long-term current use of insulin (H)       * blood glucose monitoring meter device kit    no brand specified    1 kit    Use to test blood sugar four times daily or as directed.    Type 2 diabetes mellitus without complication, without long-term current use of insulin (H)       * blood glucose monitoring meter device kit    no brand specified    1 kit    Use to test blood sugar 4 times daily or as directed.    Type 2 diabetes mellitus with complication, without long-term current use of insulin (H)       * blood glucose monitoring test strip    no brand specified    100  each    Use to test blood sugars four times daily or as directed    Type 2 diabetes mellitus without complication, without long-term current use of insulin (H)       * blood glucose monitoring test strip    no brand specified    100 strip    1 strip by In Vitro route 4 times daily (before meals and nightly) Use to test blood sugars 4 times daily or as directed    Type 2 diabetes mellitus with complication, without long-term current use of insulin (H)       Ujrktin-Mxfmu-Yyisvrhg-TenofAF [id number] MG Tabs per tablet    GENVOYA    3 tablet    Take 1 tablet by mouth daily for 3 doses        gabapentin 300 MG capsule    NEURONTIN    9 capsule    Take 1 capsule (300 mg) by mouth 3 times daily for 3 days    Herpes zoster without complication       glipiZIDE 5 MG tablet    GLUCOTROL    3 tablet    Take 0.5-1 tablets (2.5-5 mg) by mouth daily for 3 days    Type 2 diabetes mellitus with complication, without long-term current use of insulin (H)       insulin pen needle 31G X 8 MM    B-D U/F    100 each    Use 1 pen needles daily or as directed.    Type 2 diabetes mellitus without complication, without long-term current use of insulin (H)       liraglutide 18 MG/3ML soln    VICTOZA    9 mL    Week 1: 0.6 mg subcutaneously daily. Week 2: 1.2 mg subcutaneously daily. Week 3 and after: 1.8 mg subcutaneously daily    Type 2 diabetes mellitus without complication, without long-term current use of insulin (H)       * oxyCODONE IR 5 MG tablet    ROXICODONE    15 tablet    Take 1 tablet (5 mg) by mouth every 4 hours as needed for pain maximum 6 tablet(s) per day    Acute appendicitis, unspecified acute appendicitis type       * oxyCODONE IR 5 MG tablet    ROXICODONE    10 tablet    Take 1-2 tablets (5-10 mg) by mouth every 4 hours as needed for other (pain control or improvement in physical function. Hold dose for analgesic side effects.)    Peritoneal abscess (H)       * polyethylene glycol Packet    MIRALAX/GLYCOLAX    10  packet    Take 17 g by mouth daily as needed for constipation    S/P appendectomy       * polyethylene glycol Packet    MIRALAX/GLYCOLAX    24 packet    Take 17 g by mouth 2 times daily    Constipation, unspecified constipation type       senna-docusate 8.6-50 MG per tablet    SENOKOT-S;PERICOLACE    50 tablet    Take 1 tablet by mouth 2 times daily as needed for constipation    Constipation, unspecified constipation type       sodium chloride (PF) 0.9% PF flush     420 mL    Flush abdominal drain three times per day as instructed.    Peritoneal abscess (H)       * Notice:  This list has 10 medication(s) that are the same as other medications prescribed for you. Read the directions carefully, and ask your doctor or other care provider to review them with you.

## 2018-02-16 NOTE — DISCHARGE INSTRUCTIONS
"  * Heart Palpitations    Palpitations refers to the feeling that your heart is beating hard, fast or irregular. Some people describe it as \"pounding\" or \"skipped beats\". Palpitations may occur in persons with heart disease, but can also occur in healthy persons. Your doctor does not believe that anything dangerous is causing your symptoms at this time.  Heart-Related Causes:    Arrhythmia (a change from the heart's normal rhythm)    Disease of the heart valves  Non-Heart-Related Causes:    Certain medicines (such as asthma inhalers and decongestants)    Some herbal supplements, energy drinks and pills, and weight loss pills    Illegal stimulant drugs (such as cocaine, crank, methamphetamine, PCP)    Caffeine, alcohol and tobacco    Medical conditions such as thyroid disease, anemia, anxiety and panic disorder  Sometimes the cause cannot be found.  Home Care:  1. Avoid excess caffeine, alcohol, tobacco and any stimulant drugs.  2. Tell your doctor about any prescription or over-the-counter or herbal medicines you take.  Follow Up  with your doctor or as advised by our staff.  Get Prompt Medical Attention  if any of the following occur together with palpitations:    Weakness, dizziness, light-headed or fainting    Chest pain or shortness of breath    Rapid heart rate (over 120 beats per minute, at rest)    Palpitations that lasts over 20 minutes    Weakness of an arm or leg or one side of the face    Difficulty with speech or vision    1287-3491 [employer]. [address] [place] [address]. All rights reserved. This information is not intended as a substitute for professional medical care. Always follow your healthcare professional's instructions.  This information has been modified by your health care provider with permission from the publisher.        "

## 2018-02-16 NOTE — ED PROVIDER NOTES
History     Chief Complaint   Patient presents with     Dizziness     Palpitations     HPI  Andrew Lockwood is a 50 year old male with a complex past medical history arriving today to the emergency department with multiple concerns.  The patient states he has noted a firm nodule at a surgical site.  He reports no erythema.  He denies generalized abdominal pain.  He denies drainage.  He reports no nausea vomiting or diarrhea.  The patient reports no new trauma to this region.  Additionally he states that over the past several days when he has been walking he has noted his pulse to feel high.  He denies associated chest pain, shortness of breath.  He reports no fever chills or cough.  He denies shortness of breath or ongoing symptoms of palpitations.  He reports no history of dysrhythmia or known cardiac disease.    I have reviewed the Medications, Allergies, Past Medical and Surgical History, and Social History in the Epic system.    Review of Systems   Constitutional: Negative for chills, fatigue and fever.   Respiratory: Negative for shortness of breath.    Cardiovascular: Positive for palpitations. Negative for chest pain.   Gastrointestinal: Negative for abdominal pain, constipation, diarrhea and vomiting.   Genitourinary: Negative for dysuria.       Physical Exam   BP: 120/81  Pulse: 122  Temp: 98.8  F (37.1  C)  Weight: 76 kg (167 lb 8.8 oz)  SpO2: 100 %      Physical Exam   Constitutional: He appears well-developed. No distress.   Cardiovascular: Normal rate, regular rhythm and normal heart sounds.    Pulmonary/Chest: Effort normal and breath sounds normal. No respiratory distress. He has no wheezes. He has no rales.   Abdominal: Soft. He exhibits no distension. There is no tenderness. There is no rebound.       Skin: Skin is warm. No rash noted.   Psychiatric: He has a normal mood and affect.       ED Course     ED Course     Procedures             EKG Interpretation:      Interpreted by Kofi Cervantes  reviewed: [id number]  Symptoms at time of EKG: palpitations   Rhythm: normal sinus   Rate: normal  Axis: normal  Ectopy: none  Conduction: normal  ST Segments/ T Waves: No ST-T wave changes  Q Waves: none  Comparison to prior: Unchanged    Clinical Impression: normal EKG               Labs Ordered and Resulted from Time of ED Arrival Up to the Time of Departure from the ED   GLUCOSE BY METER - Abnormal; Notable for the following:        Result Value    Glucose 189 (*)     All other components within normal limits   CBC WITH PLATELETS DIFFERENTIAL   BASIC METABOLIC PANEL   MAGNESIUM   UA MACROSCOPIC WITH REFLEX TO MICRO AND CULTURE   TSH            Assessments & Plan (with Medical Decision Making)     50-year-old male with history of HIV, type 2 diabetes, laparoscopic appendectomy complicated by abscess status post IR drainage on February 2 now arriving to the emergency department with 2 primary complaints.  First the patient reports intermittent sensation of racing heart over the past several days.  On my evaluation is noted to be alert, afebrile, and hemodynamically stable.  Triage heart rate does demonstrate this to be elevated.  By the time I have evaluated the patient stat EKG obtained demonstrates a normal sinus rhythm with a rate in the 90s.  There is no sign of strain or ischemic type pattern there is no interval abnormality.  With intermittent palpitations we did place the patient on a cardiac monitor and obtain laboratory studies including electrolytes and TSH.  Additionally the patient reports concern of surgical site tenderness.  On my evaluation the port site there is a firm approximately 1 cm nodule with no overlying erythema there is no obvious purulent drainage no fluctuance or induration.  Generalized palpation abdomen reveals no involuntary guarding or distention my system at this time for recurrent acute intra-abdominal abscess warranting repeat CT scan is low.  However I did offer this to the patient  and he is declined.  Bedside ultrasonography reveals no fluid collection.  At this time I do not believe this is consistent with an abscess.  Laboratory studies otherwise demonstrate no significant electrolyte abnormality, leukocytosis, or anemia.  Overall the patient felt improved and is requested dismissal from the ER which I would agree with.    I have reviewed the nursing notes.    I have reviewed the findings, diagnosis, plan and need for follow up with the patient.    New Prescriptions    No medications on file       Final diagnoses:   Palpitations       2/15/2018   [facility] [place], EMERGENCY DEPARTMENT     Kofi Esparza MD  02/16/18 0101

## 2018-02-16 NOTE — PROGRESS NOTES
Endocrinology Clinic Visit 2/16/2018    NAME:  Andrew Lockwood  PCP:  Sharon Rosado  MRN:  [id number]  Reason for Consult:  Type 2 Diabetes  Requesting Provider:  Rekha Membreno    Chief Complaint     Chief Complaint   Patient presents with     RECHECK     return diabetes        History of Present Illness     Andrew Lockwood is a 50 year old male who is seen in clinic for diabetes management.     Patient was diagnosed with type 2 diabetes in 2014. He was started on metformin. But has had cramps since being on metformin. Eventually he figured out it was metformin causing his cramps so he stopped it and was switched to glipizide. This has caused his BG to swing more up and down. He finds his BG really drops significantly when he smokes. He is not happy with his BG on glipizide.   His A1c today is 7.5.   He denies any micro or macrovascular complications.     Associated Signs/Symptoms  Hypoglycemia: yes, feels shaky and sweaty. Hyperglycemia: none.Neuropathy: none. Vascular Symtpoms: none. Angina/CHF: none. Ulcers: No. Amputations: No    Current treatment strategy: glipizide 2.5-5 mg daily.     Blood Glucose Monitoring: he did not bring a meter    Diet: no routine    Exercise: None    Weight:   Wt Readings from Last 4 Encounters:   02/16/18 76.7 kg (169 lb)   02/16/18 77 kg (169 lb 12.8 oz)   02/15/18 76 kg (167 lb 8.8 oz)   02/11/18 75.4 kg (166 lb 4.8 oz)     Problem List     Patient Active Problem List   Diagnosis     Allergic rhinitis due to other allergen     Brain lesion     Toxoplasma encephalitis (H)     Pulmonary nodules     Human immunodeficiency virus I infection (H)     Folliculitis     Prurigo nodularis     Epidermal cyst     Shoulder joint pain, unspecified laterality     CNS toxoplasmosis (H)     Constipation     Non-intractable vomiting with nausea, vomiting of unspecified type     Thrush     Insomnia, unspecified insomnia     Bowel obstruction     Toxoplasmosis     Gastroesophageal reflux  disease     Headache     Aphthous ulcer     Type 2 diabetes mellitus (H)     Small bowel obstruction     SBO (small bowel obstruction)     Slow transit constipation     Periungual wart     Herpes zoster     Erectile dysfunction, unspecified erectile dysfunction type     Insomnia, unspecified type     Preventative health care     Pain of right thumb     Rupture of ulnar collateral ligament of right thumb     Aftercare following surgery of the musculoskeletal system     Panic disorder     Generalized anxiety disorder     Major depressive disorder, single episode, unspecified     Abdominal pain     Acute appendicitis with localized peritonitis     S/P appendectomy     Abdominal abscess (H)     Appendicitis        Medications     Current Outpatient Prescriptions   Medication     GENVOYA [phone] mg tablet     glipiZIDE (GLUCOTROL) 5 MG tablet     gabapentin (NEURONTIN) 300 MG capsule     acetaminophen (TYLENOL) 325 MG tablet     oxyCODONE IR (ROXICODONE) 5 MG tablet     acetaminophen (TYLENOL) 325 MG tablet     polyethylene glycol (MIRALAX/GLYCOLAX) Packet     sodium chloride, PF, 0.9% PF flush     senna-docusate (SENOKOT-S;PERICOLACE) 8.6-50 MG per tablet     polyethylene glycol (MIRALAX/GLYCOLAX) Packet     oxyCODONE IR (ROXICODONE) 5 MG tablet     alum & mag hydroxide-simethicone (MAALOX ADVANCED MAX ST) 400-400-40 MG/5ML SUSP suspension     blood glucose monitoring (NO BRAND SPECIFIED) meter device kit     blood glucose monitoring (NO BRAND SPECIFIED) test strip     [DISCONTINUED] glipiZIDE (GLUCOTROL) 5 MG tablet     [DISCONTINUED] gabapentin (NEURONTIN) 300 MG capsule     blood glucose monitoring (NO BRAND SPECIFIED) meter device kit     blood glucose monitoring (NO BRAND SPECIFIED) test strip     blood glucose (NO BRAND SPECIFIED) lancets standard     No current facility-administered medications for this visit.         Allergies     Allergies   Allergen Reactions     Metformin      Abdominal pain     Milk  [Lac Bovis] Hives     Tylenol [Acetaminophen] Itching       Medical / Surgical History     Past Medical History:   Diagnosis Date     AIDS (H)      Allergic rhinitis due to other allergen     DNS     Chronic abdominal pain      CNS toxoplasmosis (H)      Diabetes type 2, controlled (H)      GERD (gastroesophageal reflux disease)      HIV (human immunodeficiency virus infection) (H)      Periungual wart      Sleep apnea     doesn't use CPAP     Past Surgical History:   Procedure Laterality Date     C NONSPECIFIC PROCEDURE      right forearm fracture     COLONOSCOPY Left 1/22/2016    Procedure: COMBINED COLONOSCOPY, SINGLE OR MULTIPLE BIOPSY/POLYPECTOMY BY BIOPSY;  Surgeon: Clark Saini MD;  Location: [facility]     HC EXPLORE UNDESC TESTIS,INGUIN/SCROTAL       LAPAROSCOPIC APPENDECTOMY N/A 1/31/2018    Procedure: LAPAROSCOPIC APPENDECTOMY;  LAPAROSCOPIC APPENDECTOMY;  Surgeon: Dawn Holt MD;  Location: [facility] OR     LAPAROSCOPY DIAGNOSTIC (GENERAL) N/A 7/26/2016    Procedure: LAPAROSCOPY DIAGNOSTIC (GENERAL);  Surgeon: Susannah Arriaga MD;  Location: [facility] OR     OPTICAL TRACKING SYSTEM CRANIOTOMY, EXCISE TUMOR, COMBINED Left 4/10/2015    Procedure: COMBINED OPTICAL TRACKING SYSTEM CRANIOTOMY, EXCISE TUMOR;  Surgeon: Mirlande Colmenares MD;  Location: [facility]     REPAIR GAMEKEEPER'S THUMB Right 12/2/2016    Procedure: REPAIR LIGAMENT ULNAR COLLATERAL THUMB (GAMEKEEPER'S);  Surgeon: Evin Zamorano MD;  Location:  OR       Social History     Social History     Social History     Marital status: Single     Spouse name: N/A     Number of children: N/A     Years of education: N/A     Occupational History           5 years; temp agency     Social History Main Topics     Smoking status: Current Every Day Smoker     Packs/day: 0.20     Last attempt to quit: 10/28/2016     Smokeless tobacco: Former User      Comment: 4 cigarettes     Alcohol use No      Comment: Last etoh in 2007      "Drug use: No      Comment: \"Not very often\"     Sexual activity: Not Currently     Partners: Female, Male      Comment: Last sexual activity 2008     Other Topics Concern     Not on file     Social History Narrative    Born in [facility].  [place]ame to the [place] in 1993.  Last traveled to visit family in 2008.            Family History     Family History   Problem Relation Age of Onset     DIABETES Brother      DIABETES Father      Alzheimer Disease Father      Unknown/Adopted Mother      DIABETES Paternal Grandfather      CANCER No family hx of      no skin cancer     Skin Cancer No family hx of      no famiy hx of skin cancer       ROS     Constitutional: no fevers, chills, night sweats. No weight loss or fatigue. Good appetite  Eyes: no vision changes, no eye redness, no diplopia  Ears, Nose, mouth, throat: no hearing changes, no tinnitus, no rhinorrhea, no nasal congestion  Cardiovascular: no chest pain, no orthopnea or PND, no edema, no palpitations  Respiratory: no dyspnea, no cough, no sputum, no wheezing  Gastrointestinal: no nausea, no vomiting, no abdominal pain, no diarrhea, no constipation  Genitourinary: no dysuria, no frequency, no urgency, no nocturia  Musculoskeletal: no joint pains, no back pain, no cramps, no fractures  Skin: no rash, no itching, no dryness, no ulcers, no hair loss  Neurological: no headache, no weakness, no numbness, no dizziness, no tremors  Psychiatric: no anxiety, no sadness  Hematologic/lymphatic: no easy bruising, no bleeding, no palor    Physical Exam   /84  Pulse 105  Wt 76.7 kg (169 lb)  BMI 28.12 kg/m2     General: Comfortable, no obvious distress, normal body habitus  Eyes: Sclera anicteric, moist conjunctiva  HENT: Atraumatic, oropharynx clear, moist mucous membranes with no mucosal ulcerations  Neck: Trachea midline, supple. Thyroid: Thyroid is normal in size and texture  CV: Regular rhythm, normal rate. No murmurs auscultated  Resp: Clear to auscultation " bilaterally, good effort  Abdomen:  Soft, non tender, non distended. Bowel sounds heard. No organomegaly.  Skin: No rashes, lesions, or subcutaneous nodules.   Psych: Alert and oriented x 3. Appropriate affect, good insight  Extremities: No peripheral edema  Musculoskeletal: Appropriate muscle bulk and strength  Lymphatic: No cervical lymphadenopathy  Neuro: Moves all four extremities. No focal deficits on limited exam. Gait normal.       Labs/Imaging and Outside Records     Pertinent Labs were reviewed and updated in [facility].  Radiology Results were  reviewed and updated in EPIC.    Summary of recent findings:   Lab Results   Component Value Date    A1C 6.0 08/11/2017    A1C 6.2 05/23/2017    A1C 6.4 01/23/2017    A1C 7.0 08/18/2016    A1C 8.3 06/16/2016       TSH   Date Value Ref Range Status   02/15/2018 3.57 0.40 - 4.00 mU/L Final   05/23/2017 3.90 0.40 - 4.00 mU/L Final   04/14/2015 2.78 0.40 - 4.00 mU/L Final   04/25/2005 3.75 0.4 - 5.0 mU/L Final   04/18/2005 5.19 (H) 0.4 - 5.0 mU/L Final     T4 Free   Date Value Ref Range Status   04/14/2015 1.02 0.76 - 1.46 ng/dL Final   04/25/2005 1.19 0.70 - 1.85 ng/dL Final       Creatinine   Date Value Ref Range Status   02/15/2018 0.69 0.66 - 1.25 mg/dL Final       Recent Labs   Lab Test  08/11/17   1343  08/18/16   1012  05/07/15   1035   CHOL  194  180  150   HDL  37*  33*  30*   LDL  88  72  Cannot estimate LDL when triglyceride exceeds 400 mg/dL   TRIG  345*  377*  526*   CHOLHDLRATIO   --    --   5.0       No results found for: [id number], [id number], [id number]    I personally reviewed the patient's outside records from [facility] EMR. Summary of pertinent findings in [facility].    Impression / Plan     1. Diabetes Mellitus: Type 2  Current glycemic control can be considered poor.    We have limited information today since we are relying on self-report. Patient did not bring his meter with.     A1c suggests his BG is getting worse.     He is intolerant to metformin. Reporting  hypoglycemia to glipizide which is certainly possible.     I did discuss SGLT-2 inhibitors and GLP-1 agonists. Both would be viable options. Patient is expressing concern about amputations from Invokana. I explained the rare occurrence, and that it was not seen with trials on Jardiance. After a long discussion, he wished to try victoza.   After sending an rx to pharmacy, I received a denial by his insurance, preferring Trulicity.     However, after getting a second note from his pharmacy that he is homeless and tends to run out of meds or misplace them, it would be a safety hazard to have him on needles and injections at this point.     Plan:  - will hold off on any new meds until his social situation is figured out, and until he brings his meter and data that show his BG pattern.   - in the meantime, we will continue glipizide. I relayed this message to the clinic nurse.         Follow up: 1 month      Russ Ponce MD  Endocrinology, Diabetes and Metabolism  [facility]

## 2018-02-16 NOTE — PROGRESS NOTES
Rooming Note  Health Maintenance   Health Maintenance Due   Topic Date Due     FOOT EXAM Q1 YEAR  11/27/1968     A1C Q6 MO  02/11/2018    All health maintenance items discussed and pended.  Bianka Norton LPN at 11:11 AM on 2/16/2018.

## 2018-02-16 NOTE — ED NOTES
Patient arrives today for lightheadedness and dizziness for the past two hours. He also feels like his heart is pounding. He deines pain. He was discharged from the hospital on Tuesday 2/13 when he was admitted for 6 days. He spent the last two nights in intermediate and did not take his medications for that reason. He denies alcohol or drug use. He smokes cigarettes daily. He feels bloated and like he has heartburn.

## 2018-02-16 NOTE — MR AVS SNAPSHOT
After Visit Summary   2/16/2018    Andrew Lockwood    MRN: [id number]           Patient Information     Date Of Birth          11/27/1967        Visit Information        Provider Department      2/16/2018 10:40 AM Rekha Membreno MD [facility]        Today's Diagnoses     Type 2 diabetes mellitus without complication, without long-term current use of insulin (H)    -  1    Screening for diabetic retinopathy          Care Instructions    Primary Care Center: [phone]     Primary Care Center Medication Refill Request Information:  * Please contact your pharmacy regarding ANY request for medication refills.  ** [facility] Prescription Fax = [phone]  * Please allow 3 business days for routine medication refills.  * Please allow 5 business days for controlled substance medication refills.     Primary Care Center Test Result notification information:  *You will be notified with in 7-10 days of your appointment day regarding the results of your test.  If you are on MyChart you will be notified as soon as the provider has reviewed the results and signed off on them.          Follow-ups after your visit        Additional Services     ENDOCRINOLOGY ADULT REFERRAL       Your provider has referred you to: [facility]: Endocrinology and Diabetes Clinic [facility] [phone]   [email or web address]      Please be aware that coverage of these services is subject to the terms and limitations of your health insurance plan.  Call member services at your health plan with any benefit or coverage questions.      Please bring the following to your appointment:    >>   Any x-rays, CTs or MRIs which have been performed.  Contact the facility where they were done to arrange for  prior to your scheduled appointment.    >>   List of current medications   >>   This referral request   >>   Any documents/labs given to you for this referral             OPHTHALMOLOGY ADULT REFERRAL       Your provider has referred you to: [facility]: Eye Clinic - [place] [phone]   [email or web address]    Please be aware that coverage of these services is subject to the terms and limitations of your health insurance plan.  Call member services at your health plan with any benefit or coverage questions.      Please bring the following with you to your appointment:    (1) Any X-Rays, CTs or MRIs which have been performed.  Contact the facility where they were done to arrange for  prior to your scheduled appointment.    (2) List of current medications  (3) This referral request   (4) Any documents/labs given to you for this referral                  Follow-up notes from your care team     Return for Patient would like to see Dr. Delacruz for follow up, after endocrinology. .      Your next 10 appointments already scheduled     Feb 16, 2018  1:40 PM CST   (Arrive by 1:25 PM)   RETURN DIABETES with Russ Ponce MD   [facility] Endocrinology ([facility] [place])    [address] [place] [id number]   [phone]            Feb 20, 2018  1:00 PM CST   (Arrive by 12:45 PM)   NEW GENERAL with Hernando White OD   [facility] Ophthalmology ([facility] [place])    [address] [place] [id number] [phone]            Feb 20, 2018  3:20 PM CST   (Arrive by 3:05 PM)   Return Visit with Aguila Ingram MD   [facility] Orthopaedic [facility] ([facility] [place])    [address] [place] [address]   [phone]            Feb 21, 2018 10:00 AM CST   Post-Op with [facility] SOC PROVIDER   [facility] Surgical Care Outpatient ([facility])    [address] [facility] [address]   [phone]            Feb 27, 2018  1:05 PM CST   (Arrive by 12:50 PM)   RE-ESTABLISH NEW with Rubio Cronin MD   [facility] Primary Care Clinic (  [facility] [place])    [address] [place] [address]  [address] [facility] [id number] [phone]            Mar 09, 2018 10:30 AM CST   (Arrive by 10:15 AM)   RETURN HAND with Evin Zamorano MD   [facility] Orthopaedic Clinic ([facility])    [address] [place] [address]  [address] [facility] [id number] [phone]            Apr 06, 2018  8:30 AM CDT   (Arrive by 8:15 AM)   Return Visit with Jose Ramon Singh MD   [facility] Dermatology ([facility])    [address] [place] [address]  [address] [facility] [id number] [phone]              Future tests that were ordered for you today     Open Future Orders        Priority Expected Expires Ordered    ENDOCRINOLOGY ADULT REFERRAL Routine  2/16/2019 2/16/2018    Glucose Routine 2/16/2018 2/16/2019 2/16/2018            Who to contact     Please call your clinic at [phone] to:    Ask questions about your health    Make or cancel appointments    Discuss your medicines    Learn about your test results    Speak to your doctor            Additional Information About Your Visit        [email or web address] Information     [employer] gives you secure access to your electronic health record. If you see a primary care provider, you can also send messages to your care team and make appointments. If you have questions, please call your primary care clinic.  If you do not have a primary care provider, please call [phone] and they will assist you.      [employer] is an electronic gateway that provides easy, online access to your medical records. With [employer], you can request a clinic appointment, read your test results, renew a prescription or communicate with your care team.     To access your existing account, please contact your [facility] or call [phone] for assistance.        Care EveryWhere ID     This is your Care EveryWhere ID. This could be used by other organizations to access your  [place] medical records  [id number]        Your Vitals Were     Pulse Pulse Oximetry BMI (Body Mass Index)             105 99% 28.26 kg/m2          Blood Pressure from Last 3 Encounters:   02/16/18 116/84   02/16/18 117/84   02/13/18 111/83    Weight from Last 3 Encounters:   02/16/18 77 kg (169 lb 12.8 oz)   02/15/18 76 kg (167 lb 8.8 oz)   02/11/18 75.4 kg (166 lb 4.8 oz)              We Performed the Following     OPHTHALMOLOGY ADULT REFERRAL          Today's Medication Changes          These changes are accurate as of 2/16/18 11:41 AM.  If you have any questions, ask your nurse or doctor.               Stop taking these medicines if you haven't already. Please contact your care team if you have questions.     sulfamethoxazole-trimethoprim 800-160 MG per tablet   Commonly known as:  BACTRIM DS   Stopped by:  Rekha Membreno MD                    Primary Care Provider Office Phone # Fax #    Sharon Rosado [phone]       [address] [place] [address]        Equal Access to Services     [facility]: Hadii carissa Cody, warenettada taryn, qaybta kashweta reyna, dez moreno . So [facility] [phone].    ATENCIÓN: Si habla español, tiene a kohli disposición servicios gratuitos de asistencia lingüística. [facility] [phone].    We comply with applicable federal civil rights laws and [place] laws. We do not discriminate on the basis of race, color, national origin, age, disability, sex, sexual orientation, or gender identity.            Thank you!     Thank you for choosing [facility]IMARY [facility]IC  for your care. Our goal is always to provide you with excellent care. Hearing back from our patients is one way we can continue to improve our services. Please take a few minutes to complete the written survey that you may receive in the mail after your visit with us. Thank you!             Your Updated Medication List -  Protect others around you: Learn how to safely use, store and throw away your medicines at www.disposemymeds.org.          This list is accurate as of 2/16/18 11:41 AM.  Always use your most recent med list.                   Brand Name Dispense Instructions for use Diagnosis    * acetaminophen 325 MG tablet    TYLENOL    100 tablet    Take 2 tablets (650 mg) by mouth every 6 hours    Abdominal abscess (H)       * acetaminophen 325 MG tablet    TYLENOL    100 tablet    Take 2 tablets (650 mg) by mouth every 4 hours    Abdominal abscess (H)       alum & mag hydroxide-simethicone 400-400-40 MG/5ML Susp suspension    MAALOX ADVANCED MAX ST    355 mL    Take 30 mLs by mouth every 4 hours as needed for indigestion        blood glucose lancets standard    no brand specified    100 Box    Use to test blood sugar four times daily or as directed.    Type 2 diabetes mellitus without complication, without long-term current use of insulin (H)       * blood glucose monitoring meter device kit    no brand specified    1 kit    Use to test blood sugar four times daily or as directed.    Type 2 diabetes mellitus without complication, without long-term current use of insulin (H)       * blood glucose monitoring meter device kit    no brand specified    1 kit    Use to test blood sugar 4 times daily or as directed.    Type 2 diabetes mellitus with complication, without long-term current use of insulin (H)       * blood glucose monitoring test strip    no brand specified    100 each    Use to test blood sugars four times daily or as directed    Type 2 diabetes mellitus without complication, without long-term current use of insulin (H)       * blood glucose monitoring test strip    no brand specified    100 strip    1 strip by In Vitro route 4 times daily (before meals and nightly) Use to test blood sugars 4 times daily or as directed    Type 2 diabetes mellitus with complication, without long-term current use of insulin (H)        Wpxpooe-Ltpee-Olwyhsuw-TenofAF [phone] MG Tabs per tablet    GENVOYA    3 tablet    Take 1 tablet by mouth daily for 3 doses        gabapentin 300 MG capsule    NEURONTIN    9 capsule    Take 1 capsule (300 mg) by mouth 3 times daily for 3 days    Herpes zoster without complication       glipiZIDE 5 MG tablet    GLUCOTROL    3 tablet    Take 0.5-1 tablets (2.5-5 mg) by mouth daily for 3 days    Type 2 diabetes mellitus with complication, without long-term current use of insulin (H)       * oxyCODONE IR 5 MG tablet    ROXICODONE    15 tablet    Take 1 tablet (5 mg) by mouth every 4 hours as needed for pain maximum 6 tablet(s) per day    Acute appendicitis, unspecified acute appendicitis type       * oxyCODONE IR 5 MG tablet    ROXICODONE    10 tablet    Take 1-2 tablets (5-10 mg) by mouth every 4 hours as needed for other (pain control or improvement in physical function. Hold dose for analgesic side effects.)    Peritoneal abscess (H)       * polyethylene glycol Packet    MIRALAX/GLYCOLAX    10 packet    Take 17 g by mouth daily as needed for constipation    S/P appendectomy       * polyethylene glycol Packet    MIRALAX/GLYCOLAX    24 packet    Take 17 g by mouth 2 times daily    Constipation, unspecified constipation type       senna-docusate 8.6-50 MG per tablet    SENOKOT-S;PERICOLACE    50 tablet    Take 1 tablet by mouth 2 times daily as needed for constipation    Constipation, unspecified constipation type       sodium chloride (PF) 0.9% PF flush     420 mL    Flush abdominal drain three times per day as instructed.    Peritoneal abscess (H)       * Notice:  This list has 10 medication(s) that are the same as other medications prescribed for you. Read the directions carefully, and ask your doctor or other care provider to review them with you.

## 2018-02-16 NOTE — NURSING NOTE
Chief Complaint   Patient presents with     Hospital F/U     Patient is here to follow up from [facility].      Diabetes     Patient is here to follow up on diabetes.      Bianka Norton LPN at 10:40 AM on 2/16/2018.

## 2018-02-16 NOTE — LETTER
2/16/2018       RE: Andrew Lockwood  GENERAL [facility] [id number]     Dear Colleague,    Thank you for referring your patient, Andrew Lockwood, to the [facility] ENDOCRINOLOGY at [facility]. Please see a copy of my visit note below.    Endocrinology Clinic Visit 2/16/2018    NAME:  Andrew Lockwood  PCP:  Sharon Rosado  MRN:  [id number]  Reason for Consult:  Type 2 Diabetes  Requesting Provider:  Rekha Membreno    Chief Complaint     Chief Complaint   Patient presents with     RECHECK     return diabetes        History of Present Illness     Andrew Lockwood is a 50 year old male who is seen in clinic for diabetes management.     Patient was diagnosed with type 2 diabetes in 2014. He was started on metformin. But has had cramps since being on metformin. Eventually he figured out it was metformin causing his cramps so he stopped it and was switched to glipizide. This has caused his BG to swing more up and down. He finds his BG really drops significantly when he smokes. He is not happy with his BG on glipizide.   His A1c today is 7.5.   He denies any micro or macrovascular complications.     Associated Signs/Symptoms  Hypoglycemia: yes, feels shaky and sweaty. Hyperglycemia: none.Neuropathy: none. Vascular Symtpoms: none. Angina/CHF: none. Ulcers: No. Amputations: No    Current treatment strategy: glipizide 2.5-5 mg daily.     Blood Glucose Monitoring: he did not bring a meter    Diet: no routine    Exercise: None    Weight:   Wt Readings from Last 4 Encounters:   02/16/18 76.7 kg (169 lb)   02/16/18 77 kg (169 lb 12.8 oz)   02/15/18 76 kg (167 lb 8.8 oz)   02/11/18 75.4 kg (166 lb 4.8 oz)     Problem List     Patient Active Problem List   Diagnosis     Allergic rhinitis due to other allergen     Brain lesion     Toxoplasma encephalitis (H)     Pulmonary nodules     Human immunodeficiency virus I infection (H)     Folliculitis     Prurigo nodularis     Epidermal  cyst     Shoulder joint pain, unspecified laterality     CNS toxoplasmosis (H)     Constipation     Non-intractable vomiting with nausea, vomiting of unspecified type     Thrush     Insomnia, unspecified insomnia     Bowel obstruction     Toxoplasmosis     Gastroesophageal reflux disease     Headache     Aphthous ulcer     Type 2 diabetes mellitus (H)     Small bowel obstruction     SBO (small bowel obstruction)     Slow transit constipation     Periungual wart     Herpes zoster     Erectile dysfunction, unspecified erectile dysfunction type     Insomnia, unspecified type     Preventative health care     Pain of right thumb     Rupture of ulnar collateral ligament of right thumb     Aftercare following surgery of the musculoskeletal system     Panic disorder     Generalized anxiety disorder     Major depressive disorder, single episode, unspecified     Abdominal pain     Acute appendicitis with localized peritonitis     S/P appendectomy     Abdominal abscess (H)     Appendicitis        Medications     Current Outpatient Prescriptions   Medication     GENVOYA [phone] mg tablet     glipiZIDE (GLUCOTROL) 5 MG tablet     gabapentin (NEURONTIN) 300 MG capsule     acetaminophen (TYLENOL) 325 MG tablet     oxyCODONE IR (ROXICODONE) 5 MG tablet     acetaminophen (TYLENOL) 325 MG tablet     polyethylene glycol (MIRALAX/GLYCOLAX) Packet     sodium chloride, PF, 0.9% PF flush     senna-docusate (SENOKOT-S;PERICOLACE) 8.6-50 MG per tablet     polyethylene glycol (MIRALAX/GLYCOLAX) Packet     oxyCODONE IR (ROXICODONE) 5 MG tablet     alum & mag hydroxide-simethicone (MAALOX ADVANCED MAX ST) 400-400-40 MG/5ML SUSP suspension     blood glucose monitoring (NO BRAND SPECIFIED) meter device kit     blood glucose monitoring (NO BRAND SPECIFIED) test strip     [DISCONTINUED] glipiZIDE (GLUCOTROL) 5 MG tablet     [DISCONTINUED] gabapentin (NEURONTIN) 300 MG capsule     blood glucose monitoring (NO BRAND SPECIFIED) meter device  kit     blood glucose monitoring (NO BRAND SPECIFIED) test strip     blood glucose (NO BRAND SPECIFIED) lancets standard     No current facility-administered medications for this visit.         Allergies     Allergies   Allergen Reactions     Metformin      Abdominal pain     Milk [Lac Bovis] Hives     Tylenol [Acetaminophen] Itching       Medical / Surgical History     Past Medical History:   Diagnosis Date     AIDS (H)      Allergic rhinitis due to other allergen     DNS     Chronic abdominal pain      CNS toxoplasmosis (H)      Diabetes type 2, controlled (H)      GERD (gastroesophageal reflux disease)      HIV (human immunodeficiency virus infection) (H)      Periungual wart      Sleep apnea     doesn't use CPAP     Past Surgical History:   Procedure Laterality Date     C NONSPECIFIC PROCEDURE      right forearm fracture     COLONOSCOPY Left 1/22/2016    Procedure: COMBINED COLONOSCOPY, SINGLE OR MULTIPLE BIOPSY/POLYPECTOMY BY BIOPSY;  Surgeon: Clark Saini MD;  Location: [facility]     HC EXPLORE UNDESC TESTIS,INGUIN/SCROTAL       LAPAROSCOPIC APPENDECTOMY N/A 1/31/2018    Procedure: LAPAROSCOPIC APPENDECTOMY;  LAPAROSCOPIC APPENDECTOMY;  Surgeon: Dawn Holt MD;  Location: [facility] OR     LAPAROSCOPY DIAGNOSTIC (GENERAL) N/A 7/26/2016    Procedure: LAPAROSCOPY DIAGNOSTIC (GENERAL);  Surgeon: Susannah Arriaga MD;  Location: [facility] OR     OPTICAL TRACKING SYSTEM CRANIOTOMY, EXCISE TUMOR, COMBINED Left 4/10/2015    Procedure: COMBINED OPTICAL TRACKING SYSTEM CRANIOTOMY, EXCISE TUMOR;  Surgeon: Mirlande Colmenares MD;  Location: [facility] OR     REPAIR GAMEKEEPER'S THUMB Right 12/2/2016    Procedure: REPAIR LIGAMENT ULNAR COLLATERAL THUMB (GAMEKEEPER'S);  Surgeon: Evin Zamorano MD;  Location:  OR       Social History     Social History     Social History     Marital status: Single     Spouse name: N/A     Number of children: N/A     Years of education: N/A     Occupational History     Office  "Manager      5 years; temp agency     Social History Main Topics     Smoking status: Current Every Day Smoker     Packs/day: 0.20     Last attempt to quit: 10/28/2016     Smokeless tobacco: Former User      Comment: 4 cigarettes     Alcohol use No      Comment: Last etoh in 2007     Drug use: No      Comment: \"Not very often\"     Sexual activity: Not Currently     Partners: Female, Male      Comment: Last sexual activity 2008     Other Topics Concern     Not on file     Social History Narrative    Born in [facility].  [place]ame to the [place] in [place].  Last traveled to visit family in 2008.            Family History     Family History   Problem Relation Age of Onset     DIABETES Brother      DIABETES Father      Alzheimer Disease Father      Unknown/Adopted Mother      DIABETES Paternal Grandfather      CANCER No family hx of      no skin cancer     Skin Cancer No family hx of      no famiy hx of skin cancer       ROS     Constitutional: no fevers, chills, night sweats. No weight loss or fatigue. Good appetite  Eyes: no vision changes, no eye redness, no diplopia  Ears, Nose, mouth, throat: no hearing changes, no tinnitus, no rhinorrhea, no nasal congestion  Cardiovascular: no chest pain, no orthopnea or PND, no edema, no palpitations  Respiratory: no dyspnea, no cough, no sputum, no wheezing  Gastrointestinal: no nausea, no vomiting, no abdominal pain, no diarrhea, no constipation  Genitourinary: no dysuria, no frequency, no urgency, no nocturia  Musculoskeletal: no joint pains, no back pain, no cramps, no fractures  Skin: no rash, no itching, no dryness, no ulcers, no hair loss  Neurological: no headache, no weakness, no numbness, no dizziness, no tremors  Psychiatric: no anxiety, no sadness  Hematologic/lymphatic: no easy bruising, no bleeding, no palor    Physical Exam   /84  Pulse 105  Wt 76.7 kg (169 lb)  BMI 28.12 kg/m2     General: Comfortable, no obvious distress, normal body habitus  Eyes: Sclera " anicteric, moist conjunctiva  HENT: Atraumatic, oropharynx clear, moist mucous membranes with no mucosal ulcerations  Neck: Trachea midline, supple. Thyroid: Thyroid is normal in size and texture  CV: Regular rhythm, normal rate. No murmurs auscultated  Resp: Clear to auscultation bilaterally, good effort  Abdomen:  Soft, non tender, non distended. Bowel sounds heard. No organomegaly.  Skin: No rashes, lesions, or subcutaneous nodules.   Psych: Alert and oriented x 3. Appropriate affect, good insight  Extremities: No peripheral edema  Musculoskeletal: Appropriate muscle bulk and strength  Lymphatic: No cervical lymphadenopathy  Neuro: Moves all four extremities. No focal deficits on limited exam. Gait normal.       Labs/Imaging and Outside Records     Pertinent Labs were reviewed and updated in [employer].  Radiology Results were  reviewed and updated in EPIC.    Summary of recent findings:   Lab Results   Component Value Date    A1C 6.0 08/11/2017    A1C 6.2 05/23/2017    A1C 6.4 01/23/2017    A1C 7.0 08/18/2016    A1C 8.3 06/16/2016       TSH   Date Value Ref Range Status   02/15/2018 3.57 0.40 - 4.00 mU/L Final   05/23/2017 3.90 0.40 - 4.00 mU/L Final   04/14/2015 2.78 0.40 - 4.00 mU/L Final   04/25/2005 3.75 0.4 - 5.0 mU/L Final   04/18/2005 5.19 (H) 0.4 - 5.0 mU/L Final     T4 Free   Date Value Ref Range Status   04/14/2015 1.02 0.76 - 1.46 ng/dL Final   04/25/2005 1.19 0.70 - 1.85 ng/dL Final       Creatinine   Date Value Ref Range Status   02/15/2018 0.69 0.66 - 1.25 mg/dL Final       Recent Labs   Lab Test  08/11/17   1343  08/18/16   1012  05/07/15   1035   CHOL  194  180  150   HDL  37*  33*  30*   LDL  88  72  Cannot estimate LDL when triglyceride exceeds 400 mg/dL   TRIG  345*  377*  526*   CHOLHDLRATIO   --    --   5.0       No results found for: [id number], [id number], [id number]    I personally reviewed the patient's outside records from [facility] EMR. Summary of pertinent findings in HPI.    Impression / Plan      1. Diabetes Mellitus: Type 2  Current glycemic control can be considered poor.    We have limited information today since we are relying on self-report. Patient did not bring his meter with.     A1c suggests his BG is getting worse.     He is intolerant to metformin. Reporting hypoglycemia to glipizide which is certainly possible.     I did discuss SGLT-2 inhibitors and GLP-1 agonists. Both would be viable options. Patient is expressing concern about amputations from Invokana. I explained the rare occurrence, and that it was not seen with trials on Jardiance. After a long discussion, he wished to try victoza.   After sending an rx to pharmacy, I received a denial by his insurance, preferring Trulicity.     However, after getting a second note from his pharmacy that he is homeless and tends to run out of meds or misplace them, it would be a safety hazard to have him on needles and injections at this point.     Plan:  - will hold off on any new meds until his social situation is figured out, and until he brings his meter and data that show his BG pattern.   - in the meantime, we will continue glipizide. I relayed this message to the clinic nurse.         Follow up: 1 month      Russ Pocne MD  Endocrinology, Diabetes and Metabolism  [facility]

## 2018-02-16 NOTE — PROGRESS NOTES
CC:  Referrals    S:  Andrew presents today for the followin.  Referral to endocrinology  2.  Check glucose  3.  F/U with surgery regarding abdominal wall (his words) pain following appendectomy 18  4.  I advised that he establish care with a PCP.  He cancelled an appointment with Dr. Rodríguez for 18.  He would like to f/u with Dr. Delacruz.      He left AMA from the ER approx. 2 a.m. Today.  Reviewed ER notes.  Pertinent labs, imaging and EKGs reviewed with patient  He notes low blood sugars when he smokes cigarettes.  He is not willing to consider smoking cessation.    Attempted to discuss general diabetes management, but he would prefer to see endocrinology.    Patient Active Problem List   Diagnosis     Allergic rhinitis due to other allergen     Brain lesion     Toxoplasma encephalitis (H)     Pulmonary nodules     Human immunodeficiency virus I infection (H)     Folliculitis     Prurigo nodularis     Epidermal cyst     Shoulder joint pain, unspecified laterality     CNS toxoplasmosis (H)     Constipation     Non-intractable vomiting with nausea, vomiting of unspecified type     Thrush     Insomnia, unspecified insomnia     Bowel obstruction     Toxoplasmosis     Gastroesophageal reflux disease     Headache     Aphthous ulcer     Type 2 diabetes mellitus (H)     Small bowel obstruction     SBO (small bowel obstruction)     Slow transit constipation     Periungual wart     Herpes zoster     Erectile dysfunction, unspecified erectile dysfunction type     Insomnia, unspecified type     Preventative health care     Pain of right thumb     Rupture of ulnar collateral ligament of right thumb     Aftercare following surgery of the musculoskeletal system     Panic disorder     Generalized anxiety disorder     Major depressive disorder, single episode, unspecified     Abdominal pain     Acute appendicitis with localized peritonitis     S/P appendectomy     Abdominal abscess (H)     Appendicitis     Past  Medical History:   Diagnosis Date     AIDS (H)      Allergic rhinitis due to other allergen     DNS     Chronic abdominal pain      CNS toxoplasmosis (H)      Diabetes type 2, controlled (H)      GERD (gastroesophageal reflux disease)      HIV (human immunodeficiency virus infection) (H)      Periungual wart      Sleep apnea     doesn't use CPAP     Past Surgical History:   Procedure Laterality Date     C NONSPECIFIC PROCEDURE      right forearm fracture     COLONOSCOPY Left 1/22/2016    Procedure: COMBINED COLONOSCOPY, SINGLE OR MULTIPLE BIOPSY/POLYPECTOMY BY BIOPSY;  Surgeon: Clark Saini MD;  Location: [facility]     HC EXPLORE UNDESC TESTIS,INGUIN/SCROTAL       LAPAROSCOPIC APPENDECTOMY N/A 1/31/2018    Procedure: LAPAROSCOPIC APPENDECTOMY;  LAPAROSCOPIC APPENDECTOMY;  Surgeon: Dawn Holt MD;  Location: [facility]     LAPAROSCOPY DIAGNOSTIC (GENERAL) N/A 7/26/2016    Procedure: LAPAROSCOPY DIAGNOSTIC (GENERAL);  Surgeon: Susannah Arriaga MD;  Location: [facility]     OPTICAL TRACKING SYSTEM CRANIOTOMY, EXCISE TUMOR, COMBINED Left 4/10/2015    Procedure: COMBINED OPTICAL TRACKING SYSTEM CRANIOTOMY, EXCISE TUMOR;  Surgeon: Mirlande Colmenares MD;  Location: [facility] OR     REPAIR GAMEKEEPER'S THUMB Right 12/2/2016    Procedure: REPAIR LIGAMENT ULNAR COLLATERAL THUMB (GAMEKEEPER'S);  Surgeon: Evin Zamorano MD;  [facility]     Family History   Problem Relation Age of Onset     DIABETES Brother      DIABETES Father      Alzheimer Disease Father      Unknown/Adopted Mother      DIABETES Paternal Grandfather      CANCER No family hx of      no skin cancer     Skin Cancer No family hx of      no famiy hx of skin cancer     Social History     Social History     Marital status: Single     Spouse name: N/A     Number of children: N/A     Years of education: N/A     Occupational History           5 years; temp agency     Social History Main Topics     Smoking status: Current Every Day  "Smoker     Packs/day: 0.20     Last attempt to quit: 10/28/2016     Smokeless tobacco: Former User      Comment: 4 cigarettes     Alcohol use No      Comment: Last etoh in 2007     Drug use: No      Comment: \"Not very often\"     Sexual activity: Not Currently     Partners: Female, Male      Comment: Last sexual activity 2008     Other Topics Concern     Not on file     Social History Narrative    [place] in [place].  [place] the [place] in [place].  Last traveled to visit family in 2008.          Current Outpatient Prescriptions   Medication Sig Dispense Refill     GENVOYA [phone] mg tablet Take 1 tablet by mouth daily for 3 doses 3 tablet 0     glipiZIDE (GLUCOTROL) 5 MG tablet Take 0.5-1 tablets (2.5-5 mg) by mouth daily for 3 days 3 tablet 0     gabapentin (NEURONTIN) 300 MG capsule Take 1 capsule (300 mg) by mouth 3 times daily for 3 days 9 capsule 0     acetaminophen (TYLENOL) 325 MG tablet Take 2 tablets (650 mg) by mouth every 4 hours 100 tablet 1     oxyCODONE IR (ROXICODONE) 5 MG tablet Take 1-2 tablets (5-10 mg) by mouth every 4 hours as needed for other (pain control or improvement in physical function. Hold dose for analgesic side effects.) 10 tablet 0     acetaminophen (TYLENOL) 325 MG tablet Take 2 tablets (650 mg) by mouth every 6 hours 100 tablet 0     polyethylene glycol (MIRALAX/GLYCOLAX) Packet Take 17 g by mouth 2 times daily 24 packet 0     sodium chloride, PF, 0.9% PF flush Flush abdominal drain three times per day as instructed. 420 mL 0     senna-docusate (SENOKOT-S;PERICOLACE) 8.6-50 MG per tablet Take 1 tablet by mouth 2 times daily as needed for constipation 50 tablet 0     polyethylene glycol (MIRALAX/GLYCOLAX) Packet Take 17 g by mouth daily as needed for constipation 10 packet 0     oxyCODONE IR (ROXICODONE) 5 MG tablet Take 1 tablet (5 mg) by mouth every 4 hours as needed for pain maximum 6 tablet(s) per day 15 tablet 0     alum & mag hydroxide-simethicone (MAALOX ADVANCED MAX ST) " 400-400-40 MG/5ML SUSP suspension Take 30 mLs by mouth every 4 hours as needed for indigestion 355 mL 0     blood glucose monitoring (NO BRAND SPECIFIED) meter device kit Use to test blood sugar 4 times daily or as directed. 1 kit 0     blood glucose monitoring (NO BRAND SPECIFIED) test strip 1 strip by In Vitro route 4 times daily (before meals and nightly) Use to test blood sugars 4 times daily or as directed 100 strip 11     blood glucose monitoring (NO BRAND SPECIFIED) meter device kit Use to test blood sugar four times daily or as directed. 1 kit 0     blood glucose monitoring (NO BRAND SPECIFIED) test strip Use to test blood sugars four times daily or as directed 100 each 5     blood glucose (NO BRAND SPECIFIED) lancets standard Use to test blood sugar four times daily or as directed. 100 Box 5     [DISCONTINUED] glipiZIDE (GLUCOTROL) 5 MG tablet Take 0.5-1 tablets (2.5-5 mg) by mouth daily 30 tablet 11     [DISCONTINUED] gabapentin (NEURONTIN) 300 MG capsule Take 1 capsule (300 mg) by mouth 3 times daily 90 capsule 5     Allergies   Allergen Reactions     Metformin      Abdominal pain     Milk [Lac Bovis] Hives     Tylenol [Acetaminophen] Itching     /84  Pulse 105  Wt 77 kg (169 lb 12.8 oz)  SpO2 99%  BMI 28.26 kg/m2  Gen:  A and O, no acute distress      Component      Latest Ref Rng & Units 2/15/2018 2/16/2018   WBC      4.0 - 11.0 10e9/L 7.1    RBC Count      4.4 - 5.9 10e12/L 4.21 (L)    Hemoglobin      13.3 - 17.7 g/dL 13.6    Hematocrit      40.0 - 53.0 % 40.7    MCV      78 - 100 fl 97    MCH      26.5 - 33.0 pg 32.3    MCHC      31.5 - 36.5 g/dL 33.4    RDW      10.0 - 15.0 % 13.8    Platelet Count      150 - 450 10e9/L 504 (H)    Diff Method       Automated Method    % Neutrophils      % 53.3    % Lymphocytes      % 31.9    % Monocytes      % 9.4    % Eosinophils      % 1.6    % Basophils      % 0.6    % Immature Granulocytes      % 3.2    Nucleated RBCs      0 /100 0    Absolute  Neutrophil      1.6 - 8.3 10e9/L 3.8    Absolute Lymphocytes      0.8 - 5.3 10e9/L 2.3    Absolute Monocytes      0.0 - 1.3 10e9/L 0.7    Absolute Eosinophils      0.0 - 0.7 10e9/L 0.1    Absolute Basophils      0.0 - 0.2 10e9/L 0.0    Abs Immature Granulocytes      0 - 0.4 10e9/L 0.2    Absolute Nucleated RBC       0.0    Sodium      133 - 144 mmol/L 141    Potassium      3.4 - 5.3 mmol/L 3.8    Chloride      94 - 109 mmol/L 107    Carbon Dioxide      20 - 32 mmol/L 25    Anion Gap      3 - 14 mmol/L 9    Glucose      70 - 99 mg/dL 124 (H) 177 (H)   Urea Nitrogen      7 - 30 mg/dL 19    Creatinine      0.66 - 1.25 mg/dL 0.69    GFR Estimate      >60 mL/min/1.7m2 >90    GFR Estimate If Black      >60 mL/min/1.7m2 >90    Calcium      8.5 - 10.1 mg/dL 9.3    Magnesium      1.6 - 2.3 mg/dL 2.1    Troponin I ES      0.000 - 0.045 ug/L <0.015    TSH      0.40 - 4.00 mU/L 3.57      EXAMINATION: CT ABDOMEN PELVIS W/O CONTRAST, 2/10/2018 8:03 PM     TECHNIQUE: Helical CT images from the lung bases through the symphysis  pubis were obtained without IV contrast.      COMPARISON: 2/5/2018, 2/4/2018, 1/31/2018     HISTORY: Worsening drainage and pain in RLQ     FINDINGS:     Abdomen and pelvis:   Stable position of the percutaneous right lower quadrant surgical  drain with substantially decreased size of the intra-abdominal  abscess. Tiny focal residual fluid collection superior to the drain  containing a few tiny foci of extraluminal gas (series 3, image 242).  The adjacent terminal ileum and cecum appears thickened with  surrounding fat stranding which is stable to mildly improved.     The liver, gallbladder, spleen, adrenal glands, pancreas, and kidneys  appear normal. Prominent prostate. No dilated loops of bowel. Normal  caliber abdominal aorta. Several enlarged right lower quadrant lymph  nodes.     Lung bases:   The heart is not enlarged. No pericardial effusion. Tiny hiatal  hernia. Mild dependent bibasilar  atelectasis.     Bones and soft tissues:   Unremarkable appearance of the penile prosthesis, including pelvic  reservoir and right scrotal components. Small surrounding hydrocele.  Minimal subcutaneous fat stranding with no fluid collection  surrounding the right lower quadrant drain in the anterior abdominal  wall. No acute or worrisome osseous lesions.            IMPRESSION:   1. Stable position of the percutaneous right lower quadrant drain with  decreased size of the abdominal abscess.  2. There is residual inflammatory changes in the right lower quadrant,  including fat stranding, reactive lymphadenopathy, and thickening of  the terminal ileal wall.  3. Only mild inflammatory changes in the abdominal wall surrounding  the right lower quadrant drain.     EXAM: XR CHEST PORT 1 VW  2/11/2018 6:36 AM       HISTORY: Evaluate for tension     COMPARISON: Radiograph 12/11/2017. 2/10/2018     FINDINGS: AP radiograph of the chest. Cardiomediastinal silhouette is  stable. Bibasilar hazy opacities. No pneumothorax or pleural  effusions.          IMPRESSION:   Hazy bibasilar opacities, likely atelectasis. No pneumothorax.    Andrew was seen today for hospital f/u and diabetes.    Diagnoses and all orders for this visit:    Type 2 diabetes mellitus without complication, without long-term current use of insulin (H)  -     ENDOCRINOLOGY ADULT REFERRAL; Future  -     Glucose; Future    Screening for diabetic retinopathy  -     OPHTHALMOLOGY ADULT REFERRAL    F/U with PCP within one month.  I did inform him that Dr. Delacruz works in our clinic, but only for acute care type problem.  I encourage him to f/u with a PCP.  He has seen several providers here in the [facility] and I encouraged him to see the same PCP as often as possible for continuity of care.    Total time spent 15 minutes.  More than 50% of the time spent with Mr. Lockwood on counseling / coordinating his care    Rekha Membreno M.D.  Internal Medicine  [facility]    pager [phone]          Rekha Membreno M.D.  Internal Medicine  [facility]   pager [phone]

## 2018-02-18 ENCOUNTER — HOSPITAL ENCOUNTER (EMERGENCY)
Facility: CLINIC | Age: 53
Discharge: HOME | End: 2018-02-18
Attending: EMERGENCY MEDICINE | Admitting: EMERGENCY MEDICINE
Payer: COMMERCIAL

## 2018-02-18 ENCOUNTER — APPOINTMENT (OUTPATIENT)
Dept: MRI IMAGING | Facility: CLINIC | Age: 53
End: 2018-02-18
Attending: EMERGENCY MEDICINE
Payer: COMMERCIAL

## 2018-02-18 ENCOUNTER — APPOINTMENT (OUTPATIENT)
Dept: CT IMAGING | Facility: CLINIC | Age: 53
End: 2018-02-18
Attending: EMERGENCY MEDICINE
Payer: COMMERCIAL

## 2018-02-18 VITALS
BODY MASS INDEX: 29.32 KG/M2 | SYSTOLIC BLOOD PRESSURE: 161 MMHG | HEIGHT: 65 IN | RESPIRATION RATE: 16 BRPM | WEIGHT: 176 LBS | TEMPERATURE: 97.5 F | OXYGEN SATURATION: 97 % | HEART RATE: 87 BPM | DIASTOLIC BLOOD PRESSURE: 57 MMHG

## 2018-02-18 VITALS
OXYGEN SATURATION: 100 % | TEMPERATURE: 97.5 F | RESPIRATION RATE: 16 BRPM | SYSTOLIC BLOOD PRESSURE: 111 MMHG | HEART RATE: 90 BPM | HEIGHT: 65 IN | BODY MASS INDEX: 29.32 KG/M2 | WEIGHT: 176 LBS | DIASTOLIC BLOOD PRESSURE: 77 MMHG

## 2018-02-18 DIAGNOSIS — B20 HUMAN IMMUNODEFICIENCY VIRUS (HIV) DISEASE (H): ICD-10-CM

## 2018-02-18 DIAGNOSIS — E16.2 HYPOGLYCEMIA: ICD-10-CM

## 2018-02-18 DIAGNOSIS — E86.0 DEHYDRATION: ICD-10-CM

## 2018-02-18 DIAGNOSIS — H81.399 PERIPHERAL VERTIGO, UNSPECIFIED LATERALITY: ICD-10-CM

## 2018-02-18 LAB
ALBUMIN SERPL-MCNC: 3.5 G/DL (ref 3.4–5)
ALP SERPL-CCNC: 125 U/L (ref 40–150)
ALT SERPL W P-5'-P-CCNC: 30 U/L (ref 0–70)
ANION GAP SERPL CALCULATED.3IONS-SCNC: 12 MMOL/L (ref 3–14)
ANION GAP SERPL CALCULATED.3IONS-SCNC: 7 MMOL/L (ref 3–14)
AST SERPL W P-5'-P-CCNC: 26 U/L (ref 0–45)
BASOPHILS # BLD AUTO: 0 10E9/L (ref 0–0.2)
BASOPHILS # BLD AUTO: 0.1 10E9/L (ref 0–0.2)
BASOPHILS NFR BLD AUTO: 0.6 %
BASOPHILS NFR BLD AUTO: 0.9 %
BILIRUB SERPL-MCNC: 0.3 MG/DL (ref 0.2–1.3)
BUN SERPL-MCNC: 13 MG/DL (ref 7–30)
BUN SERPL-MCNC: 9 MG/DL (ref 7–30)
CALCIUM SERPL-MCNC: 8.8 MG/DL (ref 8.5–10.1)
CALCIUM SERPL-MCNC: 8.8 MG/DL (ref 8.5–10.1)
CHLORIDE SERPL-SCNC: 106 MMOL/L (ref 94–109)
CHLORIDE SERPL-SCNC: 109 MMOL/L (ref 94–109)
CO2 SERPL-SCNC: 22 MMOL/L (ref 20–32)
CO2 SERPL-SCNC: 25 MMOL/L (ref 20–32)
CREAT SERPL-MCNC: 0.77 MG/DL (ref 0.66–1.25)
CREAT SERPL-MCNC: 0.82 MG/DL (ref 0.66–1.25)
DIFFERENTIAL METHOD BLD: ABNORMAL
DIFFERENTIAL METHOD BLD: ABNORMAL
EOSINOPHIL # BLD AUTO: 0.1 10E9/L (ref 0–0.7)
EOSINOPHIL # BLD AUTO: 0.1 10E9/L (ref 0–0.7)
EOSINOPHIL NFR BLD AUTO: 1.1 %
EOSINOPHIL NFR BLD AUTO: 1.4 %
ERYTHROCYTE [DISTWIDTH] IN BLOOD BY AUTOMATED COUNT: 13.4 % (ref 10–15)
ERYTHROCYTE [DISTWIDTH] IN BLOOD BY AUTOMATED COUNT: 13.6 % (ref 10–15)
GFR SERPL CREATININE-BSD FRML MDRD: >90 ML/MIN/1.7M2
GFR SERPL CREATININE-BSD FRML MDRD: >90 ML/MIN/1.7M2
GLUCOSE BLDC GLUCOMTR-MCNC: 118 MG/DL (ref 70–99)
GLUCOSE BLDC GLUCOMTR-MCNC: 139 MG/DL (ref 70–99)
GLUCOSE BLDC GLUCOMTR-MCNC: 205 MG/DL (ref 70–99)
GLUCOSE BLDC GLUCOMTR-MCNC: 210 MG/DL (ref 70–99)
GLUCOSE BLDC GLUCOMTR-MCNC: 46 MG/DL (ref 70–99)
GLUCOSE BLDC GLUCOMTR-MCNC: 75 MG/DL (ref 70–99)
GLUCOSE SERPL-MCNC: 219 MG/DL (ref 70–99)
GLUCOSE SERPL-MCNC: 60 MG/DL (ref 70–99)
HCT VFR BLD AUTO: 38.7 % (ref 40–53)
HCT VFR BLD AUTO: 40.6 % (ref 40–53)
HGB BLD-MCNC: 12.8 G/DL (ref 13.3–17.7)
HGB BLD-MCNC: 13.6 G/DL (ref 13.3–17.7)
IMM GRANULOCYTES # BLD: 0.1 10E9/L (ref 0–0.4)
IMM GRANULOCYTES # BLD: 0.1 10E9/L (ref 0–0.4)
IMM GRANULOCYTES NFR BLD: 1 %
IMM GRANULOCYTES NFR BLD: 1.4 %
LYMPHOCYTES # BLD AUTO: 2.3 10E9/L (ref 0.8–5.3)
LYMPHOCYTES # BLD AUTO: 3.3 10E9/L (ref 0.8–5.3)
LYMPHOCYTES NFR BLD AUTO: 33.3 %
LYMPHOCYTES NFR BLD AUTO: 40.7 %
MCH RBC QN AUTO: 31.4 PG (ref 26.5–33)
MCH RBC QN AUTO: 32.2 PG (ref 26.5–33)
MCHC RBC AUTO-ENTMCNC: 33.1 G/DL (ref 31.5–36.5)
MCHC RBC AUTO-ENTMCNC: 33.5 G/DL (ref 31.5–36.5)
MCV RBC AUTO: 95 FL (ref 78–100)
MCV RBC AUTO: 96 FL (ref 78–100)
MONOCYTES # BLD AUTO: 0.4 10E9/L (ref 0–1.3)
MONOCYTES # BLD AUTO: 0.6 10E9/L (ref 0–1.3)
MONOCYTES NFR BLD AUTO: 6 %
MONOCYTES NFR BLD AUTO: 7.3 %
NEUTROPHILS # BLD AUTO: 3.9 10E9/L (ref 1.6–8.3)
NEUTROPHILS # BLD AUTO: 4 10E9/L (ref 1.6–8.3)
NEUTROPHILS NFR BLD AUTO: 48.6 %
NEUTROPHILS NFR BLD AUTO: 57.7 %
NRBC # BLD AUTO: 0 10*3/UL
NRBC # BLD AUTO: 0 10*3/UL
NRBC BLD AUTO-RTO: 0 /100
NRBC BLD AUTO-RTO: 0 /100
PLATELET # BLD AUTO: 410 10E9/L (ref 150–450)
PLATELET # BLD AUTO: 471 10E9/L (ref 150–450)
POTASSIUM SERPL-SCNC: 3.4 MMOL/L (ref 3.4–5.3)
POTASSIUM SERPL-SCNC: 3.4 MMOL/L (ref 3.4–5.3)
PROT SERPL-MCNC: 7.6 G/DL (ref 6.8–8.8)
RBC # BLD AUTO: 4.08 10E12/L (ref 4.4–5.9)
RBC # BLD AUTO: 4.22 10E12/L (ref 4.4–5.9)
SODIUM SERPL-SCNC: 141 MMOL/L (ref 133–144)
SODIUM SERPL-SCNC: 142 MMOL/L (ref 133–144)
WBC # BLD AUTO: 7 10E9/L (ref 4–11)
WBC # BLD AUTO: 8.1 10E9/L (ref 4–11)

## 2018-02-18 PROCEDURE — 25000128 H RX IP 250 OP 636: Performed by: EMERGENCY MEDICINE

## 2018-02-18 PROCEDURE — 96360 HYDRATION IV INFUSION INIT: CPT | Performed by: EMERGENCY MEDICINE

## 2018-02-18 PROCEDURE — 93005 ELECTROCARDIOGRAM TRACING: CPT | Performed by: EMERGENCY MEDICINE

## 2018-02-18 PROCEDURE — 99284 EMERGENCY DEPT VISIT MOD MDM: CPT | Mod: 25 | Performed by: EMERGENCY MEDICINE

## 2018-02-18 PROCEDURE — 99285 EMERGENCY DEPT VISIT HI MDM: CPT | Mod: 25 | Performed by: EMERGENCY MEDICINE

## 2018-02-18 PROCEDURE — 00000146 ZZHCL STATISTIC GLUCOSE BY METER IP

## 2018-02-18 PROCEDURE — 70544 MR ANGIOGRAPHY HEAD W/O DYE: CPT

## 2018-02-18 PROCEDURE — 80053 COMPREHEN METABOLIC PANEL: CPT | Performed by: EMERGENCY MEDICINE

## 2018-02-18 PROCEDURE — 36415 COLL VENOUS BLD VENIPUNCTURE: CPT | Performed by: EMERGENCY MEDICINE

## 2018-02-18 PROCEDURE — 25000132 ZZH RX MED GY IP 250 OP 250 PS 637: Performed by: EMERGENCY MEDICINE

## 2018-02-18 PROCEDURE — 25000132 ZZH RX MED GY IP 250 OP 250 PS 637: Performed by: FAMILY MEDICINE

## 2018-02-18 PROCEDURE — 93010 ELECTROCARDIOGRAM REPORT: CPT | Mod: Z6 | Performed by: EMERGENCY MEDICINE

## 2018-02-18 PROCEDURE — 85025 COMPLETE CBC W/AUTO DIFF WBC: CPT | Performed by: EMERGENCY MEDICINE

## 2018-02-18 PROCEDURE — 70450 CT HEAD/BRAIN W/O DYE: CPT

## 2018-02-18 PROCEDURE — 93010 ELECTROCARDIOGRAM REPORT: CPT | Mod: 77 | Performed by: EMERGENCY MEDICINE

## 2018-02-18 PROCEDURE — 80048 BASIC METABOLIC PNL TOTAL CA: CPT | Performed by: EMERGENCY MEDICINE

## 2018-02-18 PROCEDURE — 25000125 ZZHC RX 250: Performed by: EMERGENCY MEDICINE

## 2018-02-18 PROCEDURE — 96361 HYDRATE IV INFUSION ADD-ON: CPT | Performed by: EMERGENCY MEDICINE

## 2018-02-18 PROCEDURE — 25000131 ZZH RX MED GY IP 250 OP 636 PS 637: Performed by: EMERGENCY MEDICINE

## 2018-02-18 PROCEDURE — A9585 GADOBUTROL INJECTION: HCPCS | Performed by: EMERGENCY MEDICINE

## 2018-02-18 PROCEDURE — 99285 EMERGENCY DEPT VISIT HI MDM: CPT | Mod: 25,27

## 2018-02-18 RX ORDER — DIAZEPAM 2 MG
2 TABLET ORAL ONCE
Status: COMPLETED | OUTPATIENT
Start: 2018-02-18 | End: 2018-02-18

## 2018-02-18 RX ORDER — GADOBUTROL 604.72 MG/ML
7.5 INJECTION INTRAVENOUS ONCE
Status: COMPLETED | OUTPATIENT
Start: 2018-02-18 | End: 2018-02-18

## 2018-02-18 RX ORDER — ALUMINA, MAGNESIA, AND SIMETHICONE 2400; 2400; 240 MG/30ML; MG/30ML; MG/30ML
30 SUSPENSION ORAL ONCE
Status: COMPLETED | OUTPATIENT
Start: 2018-02-18 | End: 2018-02-18

## 2018-02-18 RX ORDER — MECLIZINE HYDROCHLORIDE 25 MG/1
50 TABLET ORAL ONCE
Status: COMPLETED | OUTPATIENT
Start: 2018-02-18 | End: 2018-02-18

## 2018-02-18 RX ADMIN — ALUMINUM HYDROXIDE, MAGNESIUM HYDROXIDE, AND DIMETHICONE 30 ML: 400; 400; 40 SUSPENSION ORAL at 12:27

## 2018-02-18 RX ADMIN — SODIUM CHLORIDE 1000 ML: 9 INJECTION, SOLUTION INTRAVENOUS at 03:57

## 2018-02-18 RX ADMIN — GADOBUTROL 7.5 ML: 604.72 INJECTION INTRAVENOUS at 07:52

## 2018-02-18 RX ADMIN — MECLIZINE 50 MG: 25 TABLET ORAL at 02:28

## 2018-02-18 RX ADMIN — LIDOCAINE HYDROCHLORIDE 30 ML: 20 SOLUTION ORAL; TOPICAL at 01:40

## 2018-02-18 RX ADMIN — DIAZEPAM 2 MG: 2 TABLET ORAL at 06:53

## 2018-02-18 RX ADMIN — SODIUM CHLORIDE 1000 ML: 9 INJECTION, SOLUTION INTRAVENOUS at 02:22

## 2018-02-18 ASSESSMENT — ENCOUNTER SYMPTOMS
FEVER: 0
DIZZINESS: 1
ABDOMINAL PAIN: 0
DIZZINESS: 1
SHORTNESS OF BREATH: 0
WEAKNESS: 1

## 2018-02-18 NOTE — ED NOTES
Pt presents ambulatory to triage with c/o dizziness and light headedness that began around 2 pm. State she thought it was his blood sugar so he drank Dr. Pepper. States he was unable to check his blood sugar though.

## 2018-02-18 NOTE — ED AVS SNAPSHOT
[facility], [place], Emergency Department    [address] [place] [address]    Phone:  [phone]                                       Andrew Lockwood   MRN: [id number]    Department:  [facility], Emergency Department   Date of Visit:  2/18/2018           After Visit Summary Signature Page     I have received my discharge instructions, and my questions have been answered. I have discussed any challenges I see with this plan with the nurse or doctor.    ..........................................................................................................................................  Patient/Patient Representative Signature      ..........................................................................................................................................  Patient Representative Print Name and Relationship to Patient    ..................................................               ................................................  Date                                            Time    ..........................................................................................................................................  Reviewed by Signature/Title    ...................................................              ..............................................  Date                                                            Time

## 2018-02-18 NOTE — ED AVS SNAPSHOT
[facility], Emergency Department    [address] [facility] [address]    Phone:  [phone]                                       Andrew Lockwood   MRN: [id number]    Department:  [facility], Emergency Department   Date of Visit:  2/18/2018           Patient Information     Date Of Birth          11/27/1967        Your diagnoses for this visit were:     Peripheral vertigo, unspecified laterality     Dehydration     Human immunodeficiency virus (HIV) disease (H)        You were seen by Dawn Yan MD and Sachin Rader MD.      Follow-up Information     Follow up with Sharon Rosado MD.    Specialty:  Infectious Diseases    Contact information:    [address] [facility] [address]  [facility] [address] [phone]          Discharge Instructions       Your mri did not show any stroke.  Infectious diseases recommend to see your infectious disease MD this week as planned for recommendation on antibiotics, etc.    Go to your clinic tomorrow, as a walk-in -- for follow up on today's visit, and also to get a new glucometer. Return to the ER with new or worsening symptoms.     Discharge References/Attachments     BENIGN PAROXYSMAL POSITIONAL VERTIGO (ENGLISH)      Future Appointments        Provider Department Dept Phone Center    2/20/2018 1:00 PM Hernando White OD [facility] Ophthalmology [phone] [facility]    2/20/2018 3:20 PM Aguila Ingram MD [facility] Orthopaedic [facility] [phone] [facility]    2/21/2018 10:00 AM SOC PROVIDER [facility] Surgical Care Outpatient [phone] [facility] CLIN    2/27/2018 1:05 PM Rubio Cronin MD [facility] Primary Care Clinic [phone] [facility]    3/9/2018 10:30 AM Evin Zamorano MD [facility] Orthopaedic Clinic [phone] [facility]    3/14/2018 8:40 AM Russ Ponce MD [facility] Endocrinology [phone] [facility]    4/6/2018 8:30 AM Jose Ramon Singh MD [facility] Dermatology [phone] [facility]      24 Hour Appointment Hotline       To make an  appointment at any [facility], call [phone] ([phone]). If you don't have a family doctor or clinic, we will help you find one. [facility] clinics are conveniently located to serve the needs of you and your family.             Review of your medicines      Our records show that you are taking the medicines listed below. If these are incorrect, please call your family doctor or clinic.        Dose / Directions Last dose taken    * acetaminophen 325 MG tablet   Commonly known as:  TYLENOL   Dose:  650 mg   Quantity:  100 tablet        Take 2 tablets (650 mg) by mouth every 6 hours   Refills:  0        * acetaminophen 325 MG tablet   Commonly known as:  TYLENOL   Dose:  650 mg   Quantity:  100 tablet        Take 2 tablets (650 mg) by mouth every 4 hours   Refills:  1        alum & mag hydroxide-simethicone 400-400-40 MG/5ML Susp suspension   Commonly known as:  MAALOX ADVANCED MAX ST   Dose:  30 mL   Quantity:  355 mL        Take 30 mLs by mouth every 4 hours as needed for indigestion   Refills:  0        blood glucose lancets standard   Commonly known as:  no brand specified   Quantity:  100 Box        Use to test blood sugar four times daily or as directed.   Refills:  5        * blood glucose monitoring meter device kit   Commonly known as:  no brand specified   Quantity:  1 kit        Use to test blood sugar four times daily or as directed.   Refills:  0        * blood glucose monitoring meter device kit   Commonly known as:  no brand specified   Quantity:  1 kit        Use to test blood sugar 4 times daily or as directed.   Refills:  0        * blood glucose monitoring test strip   Commonly known as:  no brand specified   Quantity:  100 each        Use to test blood sugars four times daily or as directed   Refills:  5        * blood glucose monitoring test strip   Commonly known as:  no brand specified   Dose:  1 strip   Quantity:  100 strip        1 strip by In Vitro route 4 times daily (before  meals and nightly) Use to test blood sugars 4 times daily or as directed   Refills:  11        Qkvxgox-Fsdqm-Hodbcvre-TenofAF [id number] MG Tabs per tablet   Commonly known as:  GENVOYA   Dose:  1 tablet   Quantity:  3 tablet        Take 1 tablet by mouth daily for 3 doses   Refills:  0        gabapentin 300 MG capsule   Commonly known as:  NEURONTIN   Dose:  300 mg   Quantity:  9 capsule        Take 1 capsule (300 mg) by mouth 3 times daily for 3 days   Refills:  0        glipiZIDE 5 MG tablet   Commonly known as:  GLUCOTROL   Dose:  2.5-5 mg   Quantity:  3 tablet        Take 0.5-1 tablets (2.5-5 mg) by mouth daily for 3 days   Refills:  0        insulin pen needle 31G X 8 MM   Commonly known as:  B-D U/F   Quantity:  100 each        Use 1 pen needles daily or as directed.   Refills:  0        * oxyCODONE IR 5 MG tablet   Commonly known as:  ROXICODONE   Dose:  5 mg   Quantity:  15 tablet        Take 1 tablet (5 mg) by mouth every 4 hours as needed for pain maximum 6 tablet(s) per day   Refills:  0        * oxyCODONE IR 5 MG tablet   Commonly known as:  ROXICODONE   Dose:  5-10 mg   Quantity:  10 tablet        Take 1-2 tablets (5-10 mg) by mouth every 4 hours as needed for other (pain control or improvement in physical function. Hold dose for analgesic side effects.)   Refills:  0        * polyethylene glycol Packet   Commonly known as:  MIRALAX/GLYCOLAX   Dose:  17 g   Quantity:  10 packet        Take 17 g by mouth daily as needed for constipation   Refills:  0        * polyethylene glycol Packet   Commonly known as:  MIRALAX/GLYCOLAX   Dose:  17 g   Quantity:  24 packet        Take 17 g by mouth 2 times daily   Refills:  0        senna-docusate 8.6-50 MG per tablet   Commonly known as:  SENOKOT-S;PERICOLACE   Dose:  1 tablet   Quantity:  50 tablet        Take 1 tablet by mouth 2 times daily as needed for constipation   Refills:  0        sodium chloride (PF) 0.9% PF flush   Quantity:  420 mL        Flush  abdominal drain three times per day as instructed.   Refills:  0        * Notice:  This list has 10 medication(s) that are the same as other medications prescribed for you. Read the directions carefully, and ask your doctor or other care provider to review them with you.            Procedures and tests performed during your visit     Procedure/Test Number of Times Performed    Basic metabolic panel 1    CBC with platelets differential 1    EKG 12 lead 1    Glucose by meter 2    Glucose monitor nursing POCT 1    Head CT w/o contrast 1    MR Brain for Stroke Complete 1      Orders Needing Specimen Collection     None      Pending Results     Date and Time Order Name Status Description    2/18/2018 0156 EKG 12 lead Preliminary             Pending Culture Results     No orders found from 2/16/2018 to 2/19/2018.            Pending Results Instructions     If you had any lab results that were not finalized at the time of your Discharge, you can call the ED Lab Result RN at [phone]. You will be contacted by this team for any positive Lab results or changes in treatment. The nurses are available 7 days a week from 10A to 6:30P.  You can leave a message 24 hours per day and they will return your call.        Thank you for choosing [facility]       Thank you for choosing [facility] for your care. Our goal is always to provide you with excellent care. Hearing back from our patients is one way we can continue to improve our services. Please take a few minutes to complete the written survey that you may receive in the mail after you visit with us. Thank you!        "[employer]"hart Information     [employer] gives you secure access to your electronic health record. If you see a primary care provider, you can also send messages to your care team and make appointments. If you have questions, please call your primary care clinic.  If you do not have a primary care provider, please call [phone] and they will assist you.        Care  EveryWhere ID     This is your Care EveryWhere ID. This could be used by other organizations to access your [facility] medical records  [id number]        Equal Access to Services     RA JORDAN : Apollo Cody, maverick centeno, luis enrique reyna, dez salazar. So [facility] [phone].    ATENCIÓN: Si habla español, tiene a kohli disposición servicios gratuitos de asistencia lingüística. Llame al [phone].    We comply with applicable federal civil rights laws and [place] laws. We do not discriminate on the basis of race, color, national origin, age, disability, sex, sexual orientation, or gender identity.            After Visit Summary       This is your record. Keep this with you and show to your community pharmacist(s) and doctor(s) at your next visit.

## 2018-02-18 NOTE — DISCHARGE INSTRUCTIONS
Your mri did not show any stroke.  Infectious diseases recommend to see your infectious disease MD this week as planned for recommendation on antibiotics, etc.    Go to your clinic tomorrow, as a walk-in -- for follow up on today's visit, and also to get a new glucometer. Return to the ER with new or worsening symptoms.

## 2018-02-18 NOTE — CONSULTS
"[facility]  Neurology Consultation    Patient Name:  Andrew Lockwood  MRN:  [id number]    :  1967  Date of Service:  2018  Primary care provider:  Sharon Rosado      History of Present Illness:   50 year old male h/o AIDS, DM2, CNS toxoplasmosis, recent appendectomy () with subsequent abdominal abscess who presents with a 2 week history of \"brain in a fog\" with blurry vision in his left eye, and dizziness and difficulty walking since 7 pm last night. He states that he felt dizzy last night and checked his blood sugar 3x, noticing that it was low each time; he drank some Dr Pepper but it did not help his dizziness. He feels lightheaded when standing, endorses feelings of the room spinning, and has had difficulty walking in a straight line. He does not feel like he has had dizziness like this before. He denies nausea and vomiting. He also complains of foggy thinking and L eye blurriness that began ~2 weeks ago, when he was started on a new diabetes medication, though he does not recall which medication that was. He states that he has been taking all of his medications as directed, and that he does not use alcohol or illicit drugs. He is homeless and drinks mostly soda instead of water.     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 He is on genvoya for HIV, and he discontinued bactrim maintenance therapy for toxoplasmosis 2 months ago per his primary ID physician's recommendation due to improved clinical picture.    ROS  A 10-point ROS was performed as per HPI. Pertinent negatives: nausea, vomiting, diarrhea. Positives: Dizziness, lightheadedness, vertigo, blurry vision, difficulty concentrating, RLQ pain, possible fever    PMH  Past Medical History:   Diagnosis Date     AIDS (H)      Allergic rhinitis due to other allergen     DNS     Chronic abdominal pain      CNS toxoplasmosis (H)      Diabetes type 2, " controlled (H)      GERD (gastroesophageal reflux disease)      HIV (human immunodeficiency virus infection) (H)      Periungual wart      Sleep apnea     doesn't use CPAP     Past Surgical History:   Procedure Laterality Date     C NONSPECIFIC PROCEDURE      right forearm fracture     COLONOSCOPY Left 1/22/2016    Procedure: COMBINED COLONOSCOPY, SINGLE OR MULTIPLE BIOPSY/POLYPECTOMY BY BIOPSY;  Surgeon: Clark Saini MD;  Location: [facility] GI     HC EXPLORE UNDESC TESTIS,INGUIN/SCROTAL       LAPAROSCOPIC APPENDECTOMY N/A 1/31/2018    Procedure: LAPAROSCOPIC APPENDECTOMY;  LAPAROSCOPIC APPENDECTOMY;  Surgeon: Dawn Holt MD;  Location: [facility] OR     LAPAROSCOPY DIAGNOSTIC (GENERAL) N/A 7/26/2016    Procedure: LAPAROSCOPY DIAGNOSTIC (GENERAL);  Surgeon: Susannah Arriaga MD;  Location: [facility] OR     OPTICAL TRACKING SYSTEM CRANIOTOMY, EXCISE TUMOR, COMBINED Left 4/10/2015    Procedure: COMBINED OPTICAL TRACKING SYSTEM CRANIOTOMY, EXCISE TUMOR;  Surgeon: Mirlande Colmenares MD;  Location: [facility] OR     REPAIR GAMEKEEPER'S THUMB Right 12/2/2016    Procedure: REPAIR LIGAMENT ULNAR COLLATERAL THUMB (GAMEKEEPER'S);  Surgeon: Evin Zamorano MD;  Location: [facility] OR       Medications   No current facility-administered medications for this encounter.      Current Outpatient Prescriptions   Medication     GENVOYA [phone] mg tablet     glipiZIDE (GLUCOTROL) 5 MG tablet     gabapentin (NEURONTIN) 300 MG capsule     acetaminophen (TYLENOL) 325 MG tablet     oxyCODONE IR (ROXICODONE) 5 MG tablet     acetaminophen (TYLENOL) 325 MG tablet     polyethylene glycol (MIRALAX/GLYCOLAX) Packet     senna-docusate (SENOKOT-S;PERICOLACE) 8.6-50 MG per tablet     polyethylene glycol (MIRALAX/GLYCOLAX) Packet     oxyCODONE IR (ROXICODONE) 5 MG tablet     alum & mag hydroxide-simethicone (MAALOX ADVANCED MAX ST) 400-400-40 MG/5ML SUSP suspension     blood glucose monitoring (NO BRAND SPECIFIED) meter device kit      "blood glucose monitoring (NO BRAND SPECIFIED) test strip     blood glucose monitoring (NO BRAND SPECIFIED) meter device kit     blood glucose monitoring (NO BRAND SPECIFIED) test strip     blood glucose (NO BRAND SPECIFIED) lancets standard     insulin pen needle (B-D U/F) 31G X 8 MM     sodium chloride, PF, 0.9% PF flush         Allergies  Allergies   Allergen Reactions     Metformin      Abdominal pain     Milk [Lac Bovis] Hives     Tylenol [Acetaminophen] Itching       Social History  Social History   Substance Use Topics     Smoking status: Current Every Day Smoker     Packs/day: 0.20     Last attempt to quit: 10/28/2016     Smokeless tobacco: Former User      Comment: 4 cigarettes     Alcohol use No      Comment: Last etoh in 2007       Family History    Family History   Problem Relation Age of Onset     DIABETES Brother      DIABETES Father      Alzheimer Disease Father      Unknown/Adopted Mother      DIABETES Paternal Grandfather      CANCER No family hx of      no skin cancer     Skin Cancer No family hx of      no famiy hx of skin cancer     Physical Examination   Vitals: /75  Pulse 117  Temp 97.5  F (36.4  C) (Oral)  Resp 18  Ht 1.651 m (5' 5\")  Wt 79.8 kg (176 lb)  SpO2 92%  BMI 29.29 kg/m2  General: Awake, in NAD, cooperative  HEENT: NC/AT, no icterus, op pink and moist  Cardiac: regular rhythm, no murmur  Chest: CTAB, no wheeze, rales, crackles  Abdomen: Soft, nontender to palpation, mild RLQ swelling  Extremities: No edema  Skin: No rash or lesion   Psych: Mood pleasant, affect congruent  Neuro:  Mental status: Awake, alert, attentive, oriented. Speech is fluent, no dysarthria.  Cranial nerves: Eyes conjugate, PERRLA, EOMI, no nystagmus, face symmetric, tongue/uvula midline. No change in symptoms with change in head position. No skew, negative head impulse  Motor: Tone normal. 5/5 strength in all 4 extremities. No atrophy or twitches.  Reflexes: 2+ reflexes biceps, patellar, and achilles. " "Mute plantar reflex bilaterally.  Sensory: Intact to touch in all extremities, normal Rhomberg  Coordination: FNF and heel-shin without ataxia  Gait: Normal width, stride length, turn, and arm swing. Station normal.    Investigations - Impression copied directly to ensure accuracy  MRI Head 2/18/18:  \"Impression:  1. No acute stroke.   2. No new enhancing lesions. Small residual enhancement in the left  cerebral peduncle.  3. Stable postsurgical changes of left occipital craniotomy and left  cerebellar hemisphere encephalomalacia.  4. Head MRA demonstrates no definite aneurysm or stenosis of the major  intracranial arteries.  5. Neck MRA demonstrates patent major cervical arteries.\"    Impression/Recommendations  Mr. Lockwood is a 50 year old man with a complex history including AIDS, DM2, and CNS toxoplasmosis, who presents with a 2 week history of \"brain in a fog\" with blurry vision in his left eye, and dizziness and difficulty walking since 7 pm last night. He was taking bactrim to treat CNS toxoplasmosis from the time of diagnosis 4/9/15 until 2 months ago, when treatment was stopped due to a sufficiently long period of time without CNS symptoms. His symptoms, in the context of a grossly normal neuro exam, suggest possible central vertigo. MRI this visit shows enhancing lesions in the cerebellum and midbrain, including subthalamic nucleus, substantia nigra, and red nucleus; these lesions are present in MRIs from previous visits and appear stable, including on 5/11/16. However, enhancement today suggests ongoing inflammation, raising suspicion for active toxoplasma infection as a potential cause of his symptoms. MRI is not consistent with infarction. We recommend consultation with Infectious Disease for a discussion of whether to restart treatment for toxoplasmosis.   - ID consult to discuss possibility of ongoing active toxoplasmosis  - agree with ophthalmology follow-up  - no further neurologic workup at this " time    Thank you for involving neurology in the care of Andrew Lockwood.  Please do not hesitate to call with questions/concerns (consult pager [phone]).      This patient was seen and discussed with Dr. Maurice.    This note was transcribed with the assistance of Venu Martin MS3    Shahriar Dhillon, DO  PGY3 Neurology

## 2018-02-18 NOTE — ED PROVIDER NOTES
"Patient seen by Dr. Campos initially.  Signed out to me regarding MRI.  MRI findings concerning for possible continued toxo of the cerebellum per radiology.  Neurology was consulted evaluated feel this point no sign of stroke recommended talking to infectious disease.  I did talk to infectious disease also they reviewed records.  Regulations this point to make sure patient is taking his Bactrim along with his other 2 medications Ceftin ear and Flagyl.  He can follow-up with his eye infectious disease on Monday or Tuesday as planned.  Talking the patient he states that his infectious disease doctor told him to stop taking all the antibiotics with another altering his blood sugars unclear at this point.  Therefore patient declines taking these again at this point.  Patient feels somewhat better is hungry mildly bloated wanted some and acid.  I think his dizziness is improved.  Neuro did see him and felt there was not any more nystagmus etc.  Patient is point will eat and then will be discharged to follow-up with infectious disease on Monday or Tuesday.    /77  Pulse 117  Temp 97.5  F (36.4  C) (Oral)  Resp 18  Ht 1.651 m (5' 5\")  Wt 79.8 kg (176 lb)  SpO2 100%  BMI 29.29 kg/m2       Sachin Rader MD  02/18/18 1243    "

## 2018-02-18 NOTE — ED AVS SNAPSHOT
[facility], [place], Emergency Department    [address] [place] [address]    Phone:  [phone]                                       Andrew Lockwood   MRN: [id number]    Department:  [facility], Emergency Department   Date of Visit:  2/18/2018           After Visit Summary Signature Page     I have received my discharge instructions, and my questions have been answered. I have discussed any challenges I see with this plan with the nurse or doctor.    ..........................................................................................................................................  Patient/Patient Representative Signature      ..........................................................................................................................................  Patient Representative Print Name and Relationship to Patient    ..................................................               ................................................  Date                                            Time    ..........................................................................................................................................  Reviewed by Signature/Title    ...................................................              ..............................................  Date                                                            Time

## 2018-02-18 NOTE — ED PROVIDER NOTES
History     Chief Complaint   Patient presents with     Dizziness     HPI  Andrew Lockwood is a 50 year old male with a complex past medical history, including insulin-dependent diabetes as well as HIV, who presents tonight with complaint of dizziness, concerned that his blood sugar was low.  He states the last couple of hours he has felt as though every time he moves, the room spins.  He has no headache, blurred vision, or other focal neuro complaints.  No chest pain or shortness of breath.  No vomiting or diarrhea.  He states he has a little bit of abdominal bloating today.  He states he has a problem with acid reflux, has had an acid taste in his mouth.  He had his glucometer taken from him by the police last Tuesday when he was jailed for 2 nights.  He was not able to check his sugar tonight, but assumed it was low, so drank 3 Dr. peppers.  He was not feeling significant improved so decided to come in for a check.  No fever.  He is taking all of his medications.  He denies any recent trauma.    Past Medical History:   Diagnosis Date     AIDS (H)      Allergic rhinitis due to other allergen     DNS     Chronic abdominal pain      CNS toxoplasmosis (H)      Diabetes type 2, controlled (H)      GERD (gastroesophageal reflux disease)      HIV (human immunodeficiency virus infection) (H)      Periungual wart      Sleep apnea     doesn't use CPAP       Past Surgical History:   Procedure Laterality Date     C NONSPECIFIC PROCEDURE      right forearm fracture     COLONOSCOPY Left 1/22/2016    Procedure: COMBINED COLONOSCOPY, SINGLE OR MULTIPLE BIOPSY/POLYPECTOMY BY BIOPSY;  Surgeon: Clark Saini MD;  Location: [facility]     HC EXPLORE UNDESC TESTIS,INGUIN/SCROTAL       LAPAROSCOPIC APPENDECTOMY N/A 1/31/2018    Procedure: LAPAROSCOPIC APPENDECTOMY;  LAPAROSCOPIC APPENDECTOMY;  Surgeon: Dawn Holt MD;  Location: [facility]     LAPAROSCOPY DIAGNOSTIC (GENERAL) N/A 7/26/2016    Procedure: LAPAROSCOPY  "DIAGNOSTIC (GENERAL);  Surgeon: Susannah Arriaga MD;  Location: [facility]     OPTICAL TRACKING SYSTEM CRANIOTOMY, EXCISE TUMOR, COMBINED Left 4/10/2015    Procedure: COMBINED OPTICAL TRACKING SYSTEM CRANIOTOMY, EXCISE TUMOR;  Surgeon: Mirlande Colmenares MD;  Location: [facility]     REPAIR GAMEKEEPER'S THUMB Right 12/2/2016    Procedure: REPAIR LIGAMENT ULNAR COLLATERAL THUMB (GAMEKEEPER'S);  Surgeon: Evin Zamorano MD;  [facility]       Family History   Problem Relation Age of Onset     DIABETES Brother      DIABETES Father      Alzheimer Disease Father      Unknown/Adopted Mother      DIABETES Paternal Grandfather      CANCER No family hx of      no skin cancer     Skin Cancer No family hx of      no famiy hx of skin cancer       Social History   Substance Use Topics     Smoking status: Current Every Day Smoker     Packs/day: 0.20     Last attempt to quit: 10/28/2016     Smokeless tobacco: Former User      Comment: 4 cigarettes     Alcohol use No      Comment: Last etoh in 2007         I have reviewed the Medications, Allergies, Past Medical and Surgical History, and Social History in the Epic system.    Review of Systems   Constitutional: Negative for fever.   Respiratory: Negative for shortness of breath.    Cardiovascular: Negative for chest pain.   Gastrointestinal: Negative for abdominal pain.   Neurological: Positive for dizziness.   All other systems reviewed and are negative.      Physical Exam   BP: (!) 120/96  Pulse: 117  Heart Rate: 113  Temp: 97.5  F (36.4  C)  Resp: 16  Height: 165.1 cm (5' 5\")  Weight: 79.8 kg (176 lb)  SpO2: 99 %      Physical Exam   Constitutional: He is oriented to person, place, and time. No distress.   HENT:   Head: Atraumatic.   Mouth/Throat: No oropharyngeal exudate.   Dry mm   Eyes: Pupils are equal, round, and reactive to light. No scleral icterus.   Horizontal nystagmus   Neck: Neck supple.   Cardiovascular: Normal heart sounds and intact distal pulses.    tachy "   Pulmonary/Chest: Breath sounds normal. No respiratory distress.   Abdominal: Soft. There is no tenderness.   Musculoskeletal: He exhibits no edema or tenderness.   Neurological: He is alert and oriented to person, place, and time. No cranial nerve deficit. He exhibits normal muscle tone.   Skin: Skin is warm. No rash noted. He is not diaphoretic.       ED Course     ED Course     Procedures             EKG Interpretation:      Interpreted by Dawn Yan  Time reviewed: 0205  Symptoms at time of EKG: dizzy   Rhythm: sinus tachycardia  Rate: 113  Axis: Normal  Ectopy: none  Conduction: normal  ST Segments/ T Waves: No ST-T wave changes  Q Waves: none  Comparison to prior: now tachycardic, otherwise normal    Clinical Impression: sinus tachycardia, otherwise unremarkable                Critical Care time:  none             Labs Ordered and Resulted from Time of ED Arrival Up to the Time of Departure from the ED   GLUCOSE BY METER - Abnormal; Notable for the following:        Result Value    Glucose 205 (*)     All other components within normal limits   CBC WITH PLATELETS DIFFERENTIAL - Abnormal; Notable for the following:     RBC Count 4.08 (*)     Hemoglobin 12.8 (*)     Hematocrit 38.7 (*)     Platelet Count 471 (*)     All other components within normal limits   BASIC METABOLIC PANEL - Abnormal; Notable for the following:     Glucose 219 (*)     All other components within normal limits   GLUCOSE BY METER - Abnormal; Notable for the following:     Glucose 210 (*)     All other components within normal limits   GLUCOSE MONITOR NURSING POCT            Assessments & Plan (with Medical Decision Making)   The patient states that he felt as if the room was spinning, much worse with any sort of movement, and that is what prompted him to think his sugars were low.  He has had no chest pain or shortness of breath.  He does have prominent horizontal nystagmus.  Neurologic exam is normal.  He has no ear complaints.   This did seem most consistent with peripheral vertigo.  I did give her meclizine, without improvement.  In fact, he stated he felt slightly worse.  EKG was done which showed mild sinus tachycardia.  He did look a little bit dry, states he has not been eating or drinking very well, is he has been staying with a friend.  He was given fluid, and his heart rate did come down nicely with this.  After reviewing his chart further, I do see that he had a significant episode of cerebral toxoplasmosis.  During that episode, there are multiple lesions noted in his brain on imaging.  Because of this, head CT was done, was not remarkable.  However, given the fact he did not have any improvement with meclizine, given his significant history of cerebral toxoplasmosis, and underlying HIV disease, I did opt to MRI his brain.  We will evaluate both for stroke, as well as any structural lesions.  I do not think this represents a cardiac etiology or any other systemic infectious etiology.  We will try small dose of meclizine, as I still think that peripheral vertigo is the most likely underlying etiology.  The patient be signed out to the oncoming provider pending the above.    Of note, blood sugars here have been mildly elevated.  He is encouraged to follow-up with his clinic doctor tomorrow, as a walk-in (as it is noted in the chart that he has been told to do in the past) to follow-up both on today's visit, as well as to get a new glucometer.  He has been without his glucometer for nearly a week.    I have reviewed the nursing notes.    I have reviewed the findings, diagnosis, plan and need for follow up with the patient.    Current Discharge Medication List          Final diagnoses:   Peripheral vertigo, unspecified laterality   Dehydration   Human immunodeficiency virus (HIV) disease (H)       2/18/2018   [facility], [place], EMERGENCY DEPARTMENT     Dawn Yan MD  02/18/18 0941

## 2018-02-18 NOTE — ED AVS SNAPSHOT
[facility], Emergency Department    [address] [address]    Phone:  [phone]                                       Andrew Lockwood   MRN: [id number]    Department:  [facility], Emergency Department   Date of Visit:  2/18/2018           Patient Information     Date Of Birth          11/27/1967        Your diagnoses for this visit were:     Hypoglycemia        You were seen by Christiano Robledo MD.      Follow-up Information     Follow up with Sharon Rosado MD. Schedule an appointment as soon as possible for a visit in 2 days.    Specialty:  Infectious Diseases    Contact information:    [address]  [facility] [id number] [phone]          Follow up with [facility], Emergency Department.    Specialty:  EMERGENCY MEDICINE    Why:  If symptoms worsen    Contact information:    [address] [id number] [phone]    Additional information:    The [facility] is located on the corner of [address] on [address]. It is easily accessible from virtually any point in the [facility] [facility] area, via [place] and [place].        Discharge Instructions       Please make an appointment to follow up with Your Primary Care Provider as soon as possible.  Hypoglycemia, Oral Diabetic Medicine  You have been treated for low blood sugar (hypoglycemia) caused by your diabetes medicine. Oral diabetes medicines include:    Glyburide    Glipizide    Acarbose    Metformin    Pioglitazone    Sitagliptin    Canigliflozin    Colesevelam  Low blood sugar can occur when you continue to take your usual dose of diabetes medicine but you skip meals or don t eat the amount of food that your body is used to. It can also result from taking too much diabetes medicine.  Other factors that can lower blood sugar while taking diabetes medicine include intense exercise, strong emotions, alcohol use, tobacco,  caffeine, and certain medicines. These medicines include:    Aspirin    Haloperidol    Propoxyphene    Chlorpromazine    Propranolol    Disopyramide    Lisinopril (and other angiotensin-converting enzyme inhibitors)  In addition, some over-the-counter cough and cold products contain alcohol, which can affect your blood sugar levels.  A class of medicines called beta-blockers is used for high blood pressure, rapid heart rate, and other conditions. Beta-blockers may prevent the early symptoms of low blood sugar. If you are taking a beta-blocker, you might not realize that your blood sugar is getting low. If you take a beta-blocker, talk to your healthcare provider about switching to a different class. The beta-blocker class includes:    Propranolol    Atenolol    Metoprolol    Nadolol    Labetalol    Carvedilol  Home care    During the next 24 hours rest and eat frequent small meals. This will help prevent the return of low blood sugar.    Learn the signals your body gives as your blood sugar drops (see below).  If symptoms of hypoglycemia return    Keep a source of fast-acting sugar with you. At the first sign of low blood sugar, eat or drink 15 to 20 grams of fast-acting sugar. Examples include:    3 to 4 glucose tablets (found at most drugstores)    4 ounces of regular soda    4 ounces of fruit juice    2 tablespoons of raisins    1 tablespoon of honey    For other sources, check the sugar content on the nutrition label to figure out how much you need to eat or drink to get at least 15 grams of sugar.    Check your blood sugar 15 minutes after treating yourself. If it is still low, take another 15 to 20 grams of fast-acting sugar. Test again in 15 minutes. If it remains low, call your healthcare provider or go to an emergency room.    Once blood sugar returns to normal, eat a snack or meal to keep your blood sugar in a safe range.  In the future, if you are unable to eat your normal amount due to illness or  vomiting, stop taking your diabetes medicine and contact your healthcare provider.  Wear a medical alert bracelet or necklace, or carry a card in your wallet explaining that you have diabetes. If you have a severe hypoglycemic reaction and cannot give this information, it will help medical personnel provide proper care.  Follow-up care  Follow up with your healthcare provider, or as advised.  If you have the equipment to monitor your blood sugar, do so at least twice a day--before breakfast and before dinner. Do this for the next 5 days. See your healthcare provider during the next week to review these records. This will help determine if you need an adjustment in your diabetes medicine.  For more information about diabetes, contact the American Diabetes Association, at www.diabetes.org.  When to seek medical advice  Call your healthcare provider right away if any of these symptoms of low blood sugar occur.    Fatigue    Headache    Shakes    Excess sweating    Hunger    Feeling anxious or restless    Vision changes    Drowsiness    Weakness    Confusion    Personality changes    Seizure or loss of consciousness  Date Last Reviewed: 6/1/2016 2000-2017 [employer]. [address] [place] [address]. All rights reserved. This information is not intended as a substitute for professional medical care. Always follow your healthcare professional's instructions.        Future Appointments        Provider Department Dept Phone Center    2/20/2018 1:00 PM Hernando White OD [facility] Ophthalmology [phone] [facility]    2/20/2018 3:20 PM Aguila Ingram MD [facility] Orthopaedic [facility] [phone] [facility]    2/21/2018 10:00 AM SOC PROVIDER [facility] Surgical Care Outpatient [phone] [facility]    2/27/2018 1:05 PM Rubio Cronin MD [facility] Primary Care Clinic [phone] [facility]    3/9/2018 10:30 AM Evin Zamorano MD [facility] Orthopaedic [facility] [phone] [facility]    3/14/2018 8:40 AM  MD MAHENDRA Velez [facility] Endocrinology [phone] [facility]    4/6/2018 8:30 AM Jose Ramon Singh MD [facility] Dermatology [phone] [facility]      24 Hour Appointment Hotline       To make an appointment at any [facility], call [phone] ([phone]). If you don't have a family doctor or clinic, we will help you find one. [facility] are conveniently located to serve the needs of you and your family.             Review of your medicines      Our records show that you are taking the medicines listed below. If these are incorrect, please call your family doctor or clinic.        Dose / Directions Last dose taken    * acetaminophen 325 MG tablet   Commonly known as:  TYLENOL   Dose:  650 mg   Quantity:  100 tablet        Take 2 tablets (650 mg) by mouth every 6 hours   Refills:  0        * acetaminophen 325 MG tablet   Commonly known as:  TYLENOL   Dose:  650 mg   Quantity:  100 tablet        Take 2 tablets (650 mg) by mouth every 4 hours   Refills:  1        alum & mag hydroxide-simethicone 400-400-40 MG/5ML Susp suspension   Commonly known as:  MAALOX ADVANCED MAX ST   Dose:  30 mL   Quantity:  355 mL        Take 30 mLs by mouth every 4 hours as needed for indigestion   Refills:  0        blood glucose lancets standard   Commonly known as:  no brand specified   Quantity:  100 Box        Use to test blood sugar four times daily or as directed.   Refills:  5        * blood glucose monitoring meter device kit   Commonly known as:  no brand specified   Quantity:  1 kit        Use to test blood sugar four times daily or as directed.   Refills:  0        * blood glucose monitoring meter device kit   Commonly known as:  no brand specified   Quantity:  1 kit        Use to test blood sugar 4 times daily or as directed.   Refills:  0        * blood glucose monitoring test strip   Commonly known as:  no brand specified   Quantity:  100 each        Use to test blood sugars four times daily or as directed    Refills:  5        * blood glucose monitoring test strip   Commonly known as:  no brand specified   Dose:  1 strip   Quantity:  100 strip        1 strip by In Vitro route 4 times daily (before meals and nightly) Use to test blood sugars 4 times daily or as directed   Refills:  11        Yowgvna-Pqpld-Rvpuoypi-TenofAF 338-377-331-10 MG Tabs per tablet   Commonly known as:  GENVOYA   Dose:  1 tablet   Quantity:  3 tablet        Take 1 tablet by mouth daily for 3 doses   Refills:  0        gabapentin 300 MG capsule   Commonly known as:  NEURONTIN   Dose:  300 mg   Quantity:  9 capsule        Take 1 capsule (300 mg) by mouth 3 times daily for 3 days   Refills:  0        glipiZIDE 5 MG tablet   Commonly known as:  GLUCOTROL   Dose:  2.5-5 mg   Quantity:  3 tablet        Take 0.5-1 tablets (2.5-5 mg) by mouth daily for 3 days   Refills:  0        insulin pen needle 31G X 8 MM   Commonly known as:  B-D U/F   Quantity:  100 each        Use 1 pen needles daily or as directed.   Refills:  0        * oxyCODONE IR 5 MG tablet   Commonly known as:  ROXICODONE   Dose:  5 mg   Quantity:  15 tablet        Take 1 tablet (5 mg) by mouth every 4 hours as needed for pain maximum 6 tablet(s) per day   Refills:  0        * oxyCODONE IR 5 MG tablet   Commonly known as:  ROXICODONE   Dose:  5-10 mg   Quantity:  10 tablet        Take 1-2 tablets (5-10 mg) by mouth every 4 hours as needed for other (pain control or improvement in physical function. Hold dose for analgesic side effects.)   Refills:  0        * polyethylene glycol Packet   Commonly known as:  MIRALAX/GLYCOLAX   Dose:  17 g   Quantity:  10 packet        Take 17 g by mouth daily as needed for constipation   Refills:  0        * polyethylene glycol Packet   Commonly known as:  MIRALAX/GLYCOLAX   Dose:  17 g   Quantity:  24 packet        Take 17 g by mouth 2 times daily   Refills:  0        senna-docusate 8.6-50 MG per tablet   Commonly known as:  SENOKOT-S;PERICOLACE   Dose:  1  tablet   Quantity:  50 tablet        Take 1 tablet by mouth 2 times daily as needed for constipation   Refills:  0        sodium chloride (PF) 0.9% PF flush   Quantity:  420 mL        Flush abdominal drain three times per day as instructed.   Refills:  0        * Notice:  This list has 10 medication(s) that are the same as other medications prescribed for you. Read the directions carefully, and ask your doctor or other care provider to review them with you.            Procedures and tests performed during your visit     Procedure/Test Number of Times Performed    CBC with platelets differential 1    Comprehensive metabolic panel 1    EKG 12-lead, tracing only 1    Glucose by meter 4      Orders Needing Specimen Collection     None      Pending Results     Date and Time Order Name Status Description    2/18/2018 1843 EKG 12-lead, tracing only Preliminary     2/18/2018 0156 EKG 12 lead Preliminary             Pending Culture Results     No orders found from 2/16/2018 to 2/19/2018.            Pending Results Instructions     If you had any lab results that were not finalized at the time of your Discharge, you can call the ED Lab Result RN at [phone]. You will be contacted by this team for any positive Lab results or changes in treatment. The nurses are available 7 days a week from 10A to 6:30P.  You can leave a message 24 hours per day and they will return your call.        Thank you for choosing [facility]       Thank you for choosing [facility] for your care. Our goal is always to provide you with excellent care. Hearing back from our patients is one way we can continue to improve our services. Please take a few minutes to complete the written survey that you may receive in the mail after you visit with us. Thank you!        [facility] Information     [employer] gives you secure access to your electronic health record. If you see a primary care provider, you can also send messages to your care team and make appointments.  If you have questions, please call your primary care clinic.  If you do not have a primary care provider, please call [phone] and they will assist you.        Care EveryWhere ID     This is your Care EveryWhere ID. This could be used by other organizations to access your [facility] [place] medical records  [id number]        Equal Access to Services     RA JORDAN : Apollo Cody, maverick centeno, dez jackson. So [facility] [phone].    ATENCIÓN: Si habla español, tiene a kohli disposición servicios gratuitos de asistencia lingüística. Llame al [phone].    We comply with applicable federal civil rights laws and [place] laws. We do not discriminate on the basis of race, color, national origin, age, disability, sex, sexual orientation, or gender identity.            After Visit Summary       This is your record. Keep this with you and show to your community pharmacist(s) and doctor(s) at your next visit.

## 2018-02-19 ENCOUNTER — TELEPHONE (OUTPATIENT)
Dept: ENDOCRINOLOGY | Facility: CLINIC | Age: 53
End: 2018-02-19

## 2018-02-19 LAB
INTERPRETATION ECG - MUSE: NORMAL
INTERPRETATION ECG - MUSE: NORMAL

## 2018-02-19 NOTE — ED NOTES
"Triage Assessment & Note:    /62  Pulse 97  Temp 97.5  F (36.4  C) (Oral)  Resp 18  Ht 1.651 m (5' 5\")  Wt 79.8 kg (176 lb)  SpO2 100%  BMI 29.29 kg/m2    Patient presents with: c/o \"blood sugar problems\" PT reports that he was here with a \"stroke this Am and DC\" PT reports feeling hot now.  Blood glucose in triage 46    Home Treatments/Remedies: None    Febrile / Afebrile? Afebrile    Duration of C/o:      Mick Barrientos  February 18, 2018      "

## 2018-02-19 NOTE — DISCHARGE INSTRUCTIONS
Please make an appointment to follow up with Your Primary Care Provider as soon as possible.  Hypoglycemia, Oral Diabetic Medicine  You have been treated for low blood sugar (hypoglycemia) caused by your diabetes medicine. Oral diabetes medicines include:    Glyburide    Glipizide    Acarbose    Metformin    Pioglitazone    Sitagliptin    Canigliflozin    Colesevelam  Low blood sugar can occur when you continue to take your usual dose of diabetes medicine but you skip meals or don t eat the amount of food that your body is used to. It can also result from taking too much diabetes medicine.  Other factors that can lower blood sugar while taking diabetes medicine include intense exercise, strong emotions, alcohol use, tobacco, caffeine, and certain medicines. These medicines include:    Aspirin    Haloperidol    Propoxyphene    Chlorpromazine    Propranolol    Disopyramide    Lisinopril (and other angiotensin-converting enzyme inhibitors)  In addition, some over-the-counter cough and cold products contain alcohol, which can affect your blood sugar levels.  A class of medicines called beta-blockers is used for high blood pressure, rapid heart rate, and other conditions. Beta-blockers may prevent the early symptoms of low blood sugar. If you are taking a beta-blocker, you might not realize that your blood sugar is getting low. If you take a beta-blocker, talk to your healthcare provider about switching to a different class. The beta-blocker class includes:    Propranolol    Atenolol    Metoprolol    Nadolol    Labetalol    Carvedilol  Home care    During the next 24 hours rest and eat frequent small meals. This will help prevent the return of low blood sugar.    Learn the signals your body gives as your blood sugar drops (see below).  If symptoms of hypoglycemia return    Keep a source of fast-acting sugar with you. At the first sign of low blood sugar, eat or drink 15 to 20 grams of fast-acting sugar. Examples  include:    3 to 4 glucose tablets (found at most drugstores)    4 ounces of regular soda    4 ounces of fruit juice    2 tablespoons of raisins    1 tablespoon of honey    For other sources, check the sugar content on the nutrition label to figure out how much you need to eat or drink to get at least 15 grams of sugar.    Check your blood sugar 15 minutes after treating yourself. If it is still low, take another 15 to 20 grams of fast-acting sugar. Test again in 15 minutes. If it remains low, call your healthcare provider or go to an emergency room.    Once blood sugar returns to normal, eat a snack or meal to keep your blood sugar in a safe range.  In the future, if you are unable to eat your normal amount due to illness or vomiting, stop taking your diabetes medicine and contact your healthcare provider.  Wear a medical alert bracelet or necklace, or carry a card in your wallet explaining that you have diabetes. If you have a severe hypoglycemic reaction and cannot give this information, it will help medical personnel provide proper care.  Follow-up care  Follow up with your healthcare provider, or as advised.  If you have the equipment to monitor your blood sugar, do so at least twice a day--before breakfast and before dinner. Do this for the next 5 days. See your healthcare provider during the next week to review these records. This will help determine if you need an adjustment in your diabetes medicine.  For more information about diabetes, contact the American Diabetes Association, at www.diabetes.org.  When to seek medical advice  Call your healthcare provider right away if any of these symptoms of low blood sugar occur.    Fatigue    Headache    Shakes    Excess sweating    Hunger    Feeling anxious or restless    Vision changes    Drowsiness    Weakness    Confusion    Personality changes    Seizure or loss of consciousness  Date Last Reviewed: 6/1/2016 2000-2017 [employer]. [address] [place] [address]  [place] [address]. All rights reserved. This information is not intended as a substitute for professional medical care. Always follow your healthcare professional's instructions.

## 2018-02-19 NOTE — TELEPHONE ENCOUNTER
----- Message from Russ Ponce MD sent at 2/18/2018 11:56 AM CST -----  Ok I would then hold off on any injectable until his social situation is improved.   ? consult?  Otherwise, he says he has an intolerance to metformin.     So we are limited by our options.     Continue glipizide until next visit.   I will discontinue trulicity and victoza rx    Russ    ----- Message -----     From: Zunilda Lindo RN     Sent: 2/16/2018   3:48 PM       To: MD Dr Rosario Romero, Spoke w/ [facility] Pharmacist who expressed concern over Trulicity (any injectable) for this Pt. Stated Pt is homeless, with no safe way to store insulin. Stated carrying medication makes Pt vulnerable to theft and then insulin remains unavailable until next refill date.  Also states Pharmacy repeatedly provides food/glucose for hypoglycemic episodes when Pt is in clinic. Asked MD to consider possible trial of extended release metformin and titrate up, as PT stated stomach upset w/ immediate release.

## 2018-02-19 NOTE — ED PROVIDER NOTES
"    [place] EMERGENCY DEPARTMENT ([facility])    History     Chief Complaint   Patient presents with     Hypoglycemia     did not check blood sugar     HPI  Andrew Lockwood is a 50 year old male with a history of NIDDM-2, recent appendicitis (recently completed by intra-abdominal abscess), herpes zoster, AIDS/HIV with CNS toxoplasmosis complications, and GERD who presents to the ED today with generalized weakness and dizziness, with a concern for hypoglycemia.  Patient states immediately prior to arrival he was on his way to a friend's house when he developed symptoms consistent with previous episodes of hypoglycemia. These include diaphoresis, lightheadedness, \"heavy\" limbs, and feeling as though he was  unable to walk. Patient states that he also felt faint and that \"7 blocks felt like 20\". Patient reports eating well and taking medications glipizide 2.5-5 mg, and liraglutide 18 mg; although the patient reports pills are \"not working\" for him and he is switching to Dulaglutide .75 mg Q7 days. Patient reports they forgot to retrieve Dulaglutide prescription last Friday, and therefore was instructed to take his oral hypoglycemic medications until he can  his prescription for this tomorrow. The patient last ate around 14:30 today, he had just taken his oral hypoglycemic agents immediately prior to the onset of his symptoms.     Patient does follow at the [facility] for diabetes management, with plans for changes to his current medication regimen as noted above. Patient was seen in this emergency department overnight (2/18/18) for vertiginous dizziness and was discharged earlier this morning. He states that there was initial concern for a possible CVA. These records were reviewed extensively. He did undergo an MRI and a neurology consult, and it was felt as though his symptoms were related to previously diagnosed cerebral/cerebellar toxoplasmosis. He was discharged with the appropriate " follow up. He states that he is currently staying with a friend (the patient does have a history of homelessness).     I have reviewed the Medications, Allergies, Past Medical and Surgical History, and Social History in the [facility] system.    PAST MEDICAL HISTORY:   Past Medical History:   Diagnosis Date     AIDS (H)      Allergic rhinitis due to other allergen     DNS     Chronic abdominal pain      CNS toxoplasmosis (H)      Diabetes type 2, controlled (H)      GERD (gastroesophageal reflux disease)      HIV (human immunodeficiency virus infection) (H)      Periungual wart      Sleep apnea     doesn't use CPAP       PAST SURGICAL HISTORY:   Past Surgical History:   Procedure Laterality Date     C NONSPECIFIC PROCEDURE      right forearm fracture     COLONOSCOPY Left 1/22/2016    Procedure: COMBINED COLONOSCOPY, SINGLE OR MULTIPLE BIOPSY/POLYPECTOMY BY BIOPSY;  Surgeon: Clark Saini MD;  Location: [facility] GI     HC EXPLORE UNDESC TESTIS,INGUIN/SCROTAL       LAPAROSCOPIC APPENDECTOMY N/A 1/31/2018    Procedure: LAPAROSCOPIC APPENDECTOMY;  LAPAROSCOPIC APPENDECTOMY;  Surgeon: Dawn Holt MD;  Location: [facility] OR     LAPAROSCOPY DIAGNOSTIC (GENERAL) N/A 7/26/2016    Procedure: LAPAROSCOPY DIAGNOSTIC (GENERAL);  Surgeon: Susannah Arriaga MD;  Location: [facility] OR     OPTICAL TRACKING SYSTEM CRANIOTOMY, EXCISE TUMOR, COMBINED Left 4/10/2015    Procedure: COMBINED OPTICAL TRACKING SYSTEM CRANIOTOMY, EXCISE TUMOR;  Surgeon: Mirlande Colmenares MD;  Location: [facility] OR     REPAIR GAMEKEEPER'S THUMB Right 12/2/2016    Procedure: REPAIR LIGAMENT ULNAR COLLATERAL THUMB (GAMEKEEPER'S);  Surgeon: Evin Zamorano MD;  Location:  OR       FAMILY HISTORY:   Family History   Problem Relation Age of Onset     DIABETES Brother      DIABETES Father      Alzheimer Disease Father      Unknown/Adopted Mother      DIABETES Paternal Grandfather      CANCER No family hx of      no skin cancer     Skin Cancer No family hx  of      no famiy hx of skin cancer       SOCIAL HISTORY:   Social History   Substance Use Topics     Smoking status: Current Every Day Smoker     Packs/day: 0.20     Last attempt to quit: 10/28/2016     Smokeless tobacco: Former User      Comment: 4 cigarettes     Alcohol use No      Comment: Last etoh in 2007       Discharge Medication List as of 2/18/2018  8:25 PM      CONTINUE these medications which have NOT CHANGED    Details   GENVOYA [phone] mg tablet Take 1 tablet by mouth daily for 3 doses, Disp-3 tablet, R-0, Local Print      glipiZIDE (GLUCOTROL) 5 MG tablet Take 0.5-1 tablets (2.5-5 mg) by mouth daily for 3 days, Disp-3 tablet, R-0, Local Print      gabapentin (NEURONTIN) 300 MG capsule Take 1 capsule (300 mg) by mouth 3 times daily for 3 days, Disp-9 capsule, R-0, Local Print      insulin pen needle (B-D U/F) 31G X 8 MM Use 1 pen needles daily or as directed.Disp-100 each, [id number]      !! acetaminophen (TYLENOL) 325 MG tablet Take 2 tablets (650 mg) by mouth every 4 hours, Disp-100 tablet, R-1, E-Prescribe      !! oxyCODONE IR (ROXICODONE) 5 MG tablet Take 1-2 tablets (5-10 mg) by mouth every 4 hours as needed for other (pain control or improvement in physical function. Hold dose for analgesic side effects.), Disp-10 tablet, R-0, Local Print      !! acetaminophen (TYLENOL) 325 MG tablet Take 2 tablets (650 mg) by mouth every 6 hours, Disp-100 tablet, R-0, E-Prescribe      !! polyethylene glycol (MIRALAX/GLYCOLAX) Packet Take 17 g by mouth 2 times daily, Disp-24 packet, R-0, E-Prescribe      sodium chloride, PF, 0.9% PF flush Flush abdominal drain three times per day as instructed., Disp-420 mL, R-0, E-PrescribeNeeds 42 - 10cc flushes for drain irrigation.      senna-docusate (SENOKOT-S;PERICOLACE) 8.6-50 MG per tablet Take 1 tablet by mouth 2 times daily as needed for constipation, Disp-50 tablet, R-0, E-Prescribe      !! polyethylene glycol (MIRALAX/GLYCOLAX) Packet Take 17 g by mouth  "daily as needed for constipation, Disp-10 packet, R-0, E-Prescribe      !! oxyCODONE IR (ROXICODONE) 5 MG tablet Take 1 tablet (5 mg) by mouth every 4 hours as needed for pain maximum 6 tablet(s) per day, Disp-15 tablet, R-0, Local Print      alum & mag hydroxide-simethicone (MAALOX ADVANCED MAX ST) 400-400-40 MG/5ML SUSP suspension Take 30 mLs by mouth every 4 hours as needed for indigestion, Disp-355 mL, R-0, Local Print      !! blood glucose monitoring (NO BRAND SPECIFIED) meter device kit Use to test blood sugar 4 times daily or as directed.Disp-1 kit, [id number]      !! blood glucose monitoring (NO BRAND SPECIFIED) test strip 1 strip by In Vitro route 4 times daily (before meals and nightly) Use to test blood sugars 4 times daily or as directed, Disp-100 strip, R-11, E-Prescribe      !! blood glucose monitoring (NO BRAND SPECIFIED) meter device kit Use to test blood sugar four times daily or as directed.Disp-1 kit, R-0Local Print      !! blood glucose monitoring (NO BRAND SPECIFIED) test strip Use to test blood sugars four times daily or as directed, Disp-100 each, R-5, Local Print      blood glucose (NO BRAND SPECIFIED) lancets standard Use to test blood sugar four times daily or as directed.Disp-100 Box, R-5Local Print       !! - Potential duplicate medications found. Please discuss with provider.             Allergies   Allergen Reactions     Metformin      Abdominal pain     Milk [Lac Bovis] Hives     Tylenol [Acetaminophen] Itching       Review of Systems   Gastrointestinal: Abdominal pain: lower left abdominal tenderness.   Neurological: Positive for dizziness and weakness.   All other systems reviewed and are negative.    Physical Exam   BP: 124/62  Pulse: 97  Temp: 97.5  F (36.4  C)  Resp: 18  Height: 165.1 cm (5' 5\")  Weight: 79.8 kg (176 lb)  SpO2: 100 %  Physical Exam    GENERAL: Well-appearing, no acute distress.  HENT:    Head: Normocephalic. Atraumatic.   Ears: External ears normal, hearing " grossly intact.   Nose: No external deformities.   Throat/Mouth: Moist oral mucosa. Posterior oropharynx is clear.  EYES: Pupils equal, round, reactive. No conjunctival pallor, sclerae clear. EOMI.  CV: Regular rate, regular rhythm. Warm and well perfused.  PULMONARY: Effort normal. No accessory muscle use. Good air movement. No stridor. Lung sounds clear bilaterally with no wheezing, rhonchi, or rales.  GI: Soft, non-distended, non-tender to palpation.  NEURO: Alert, awake. Oriented x3. No focal sensory loss or muscular weakness. No facial drooping, no slurring of speech.  Cranial nerves II through XII are intact bilaterally.  MSK:    Neck: Supple.   Extremities: No gross deformity. Coordinated movement of all extremities.  INTEGUMENTARY: Warm. No diaphoresis. No rashes, jaundice, or ecchymoses.  PSYCH: Appropriate affect. Behavior is normal. Linear thought process. Normal insight.    ED Course     Procedures       EKG Interpretation:      Interpreted by Christiano Robledo DO  Time reviewed: 19:00  Symptoms at time of EKG: Lightheadedness  Rhythm: normal sinus   Rate: Normal  Axis: Normal  Ectopy: none  Conduction: normal  ST Segments/ T Waves: Non-specific ST-T wave changes  Q Waves: none  Comparison to prior: Unchanged from 2/15/2018    Clinical Impression: normal EKG    Critical Care time:  none  Labs Ordered and Resulted from Time of ED Arrival Up to the Time of Departure from the ED   GLUCOSE BY METER - Abnormal; Notable for the following:        Result Value    Glucose 46 (*)     All other components within normal limits   CBC WITH PLATELETS DIFFERENTIAL - Abnormal; Notable for the following:     RBC Count 4.22 (*)     All other components within normal limits   COMPREHENSIVE METABOLIC PANEL - Abnormal; Notable for the following:     Glucose 60 (*)     All other components within normal limits   GLUCOSE BY METER - Abnormal; Notable for the following:     Glucose 118 (*)     All other components within  normal limits   GLUCOSE BY METER - Abnormal; Notable for the following:     Glucose 139 (*)     All other components within normal limits   GLUCOSE BY METER     Assessments & Plan (with Medical Decision Making)   Primary Evaluation:  Patient was seen and examined in the Emergency Department and was noted to be in no acute distress. Initial vital signs demonstrated no abnormalities. Airway was patent and protected. Breathing was intact. Hemodynamics were stable.    Patient's history was reviewed in the chart and with the patient.    MDM and Disposition:  Patient presented for evaluation of symptomatic hypoglycemia.  The patient had recently taken an oral hypoglycemic agent, however did not eat at the same time.  He had been in the emergency department all night for evaluation of possible strokelike symptoms (however CVA had been ruled out and the patient was discharged).  The patient's initial blood glucose in triage was 46.    An IV was placed and labs were drawn.  ECG was unchanged with no evidence of acute ischemic injury or arrhythmia.  Patient was given a meal which included complex carbohydrates.  CMP was notable primarily for continued low sugar at 60, however this was taken just as the patient had started to eat.  CBC was unrevealing.  Patient continued to eat a full meal with no nausea/vomiting.  Serial blood glucose levels were measured over the next few hours and remained normoglycemic.  Patient was never given parenteral glucose.  He does have access to food and plans to stay with a friend.  He was felt to be safe for discharge home with strong recommendations to follow up with his endocrinologist tomorrow.  The patient is currently in the process of switching from an oral hypoglycemic agent for better management of his diabetes.    Given the patient's clinical history, physical exam, and results of our diagnostic work-up, the decision was made to discharge the patient to home in good condition after all  results were discussed and all questions were answered. Discussed the plan with the patient, including complications and follow up. Strict return to ED precautions were given. All who were present expressed understanding and agreement with the diagnosis, treatment, and plan.  The patient is to follow up with his primary care provider in endocrinologist in the next 2-3 days.    Patient remained hemodynamically stable throughout his stay in the Emergency Department.    Final Clinical Impression:  Hypoglycemia secondary to oral hypoglycemic use    I have reviewed the nursing notes.  I have reviewed the findings, diagnosis, plan and need for follow up with the patient.  Christiano Robledo DO  Emergency Medicine  Pager: [phone]    Final diagnoses:   Hypoglycemia     Kate KELLY, am serving as a trained medical scribe to document services personally performed by Christiano Robledo DO, based on the provider's statements to me.      Christiano KELLY DO, was physically present and have reviewed and verified the accuracy of this note documented by Kate Woodruff.       2/18/2018   [facility] [place], EMERGENCY DEPARTMENT     Christiano Robledo MD  02/19/18 [id number]

## 2018-02-21 ENCOUNTER — OFFICE VISIT (OUTPATIENT)
Dept: SURGERY | Facility: CLINIC | Age: 53
End: 2018-02-21
Attending: SURGERY
Payer: MEDICAID

## 2018-02-21 VITALS — RESPIRATION RATE: 20 BRPM | DIASTOLIC BLOOD PRESSURE: 90 MMHG | SYSTOLIC BLOOD PRESSURE: 140 MMHG | HEART RATE: 102 BPM

## 2018-02-21 DIAGNOSIS — R14.1 GAS PAIN: Primary | ICD-10-CM

## 2018-02-21 RX ORDER — SIMETHICONE 125 MG
125 TABLET,CHEWABLE ORAL 2 TIMES DAILY
Qty: 120 TABLET | Refills: 0 | Status: SHIPPED | OUTPATIENT
Start: 2018-02-21 | End: 2018-04-26

## 2018-02-21 ASSESSMENT — PAIN SCALES - GENERAL: PAINLEVEL: SEVERE PAIN (6)

## 2018-02-21 NOTE — LETTER
Date:February 22, 2018      Patient was self referred, no letter generated. Do not send.        [facility] Health Information

## 2018-02-21 NOTE — PROGRESS NOTES
Surgery Clinic Outpatient Progress Note  February 21, 2018  Andrew Lockwood  [id number]    S: Andrew Lockwood is a 50 year old male who presents to the clinic in follow-up of lap appy complicated by abscess requiring readmission, IR drain and IV antibiotics. The patient is eating normally and he denies any bowel complaints such as nausea, constipation or diarrhea. His main complaint and reason for visit today is gas pain.  ROS: As above, and he denies any other new complaints.  O:140/90 102 98%  Drain: The patient does not have any drains or tubes we are monitoring.  Physical Exam:  Gen: Alert, oriented, no distress  Psych: Normal affect  Neuro: No focal deficit  Resp: Quiet breathing  CV: Warm and well perfused.  Abd: soft, minimal diffuse tenderness with voluntary guarding.   Ext: No obvious deformities    A/P:   Andrew Lockwood is a 50 year old male s/p lap appy who is here complaining of gas pains.  He s eating normally and is only asking for a gas med prescription.  I don't think further testing is needed.  I will give him a Rx for simethicone as requested.  He can FU with PCP from now on.  We are happy to see him back if the PCP feels there is something surgical.    Total time 15 mins, the majority of which was spent in counseling.  Dawn Holt MD FACS  Associate Professor of Surgery  Pager [phone]

## 2018-02-21 NOTE — NURSING NOTE
Patient presents this morning to see Dr Holt for persistent bloating and abdo pain.  Vitals 140/90, , RR 20, O2 98% on room air.  Allergies and home medications are reviewed.  Patient is examined by Dr Holt and is given a prescription for Simethicone.   Patient is discharged to home on foot.

## 2018-02-21 NOTE — MR AVS SNAPSHOT
After Visit Summary   2/21/2018    Andrew Lockwood    MRN: [id number]           Patient Information     Date Of Birth          11/27/1967        Visit Information        Provider Department      2/21/2018 10:00 AM [facility] SOC PROVIDER [facility] Surgical Care Outpatient        Today's Diagnoses     Gas pain    -  1      Care Instructions    Discharged from surgery Clinic.  Follow-up with Primary Care for gas pains.    Discharge Instructions for Abdominal Surgery  Abdominal surgery is done through an incision in your belly. It may take a few weeks or longer to heal from the surgery. This sheet gives instructions on how to care for yourself once you re home.  Medicines  Here is what to expect:    You may be prescribed pain medicine. Do not wait until your pain becomes severe before taking the medicine. It may not work as well if you wait too long to take it between doses.    Most surgeons prescribe stool softeners along with opioid prescriptions. Take these as prescribed.     You may be prescribed antibiotics to help treat or prevent infection. Be sure to take all of the antibiotics even if you start to feel better.  Diet  Dietary tips include:    Follow any diet instructions given by your healthcare provider. You may need to start with liquids and then slowly add solid foods back into your diet.     If you have constipation, your healthcare provider may tell you to add more fiber to your diet. You may also be told to use a laxative or stool softener. These can often be bought over the counter.    Drink plenty of fluids.  Activity  Recommendations include the following:    Rest as often as needed.    Ask your healthcare provider when you can shower or bathe. Have someone nearby in case you need help.    Ask your family and friends to help with chores and errands.    Don t mow the lawn, vacuum, or do any strenuous activities for 4 to 6 weeks.    Avoid lifting anything over 10 pounds for 4 to 6 weeks.    Avoid driving  until your healthcare provider says it s OK.    Walk as often as you feel able.    Do the coughing and breathing exercises you were taught in the hospital. If you were given an incentive spirometer to help with breathing, use it as directed. This is important and will help prevent lung infections.     Ask your healthcare provider when you can return to work.  Incision and drain care  Do's and don'ts include the following:    Keep your incision clean and dry. It s OK to wash the skin around your incision with mild soap and water.    If you have a dressing over your incision, change it as you were told. Replace the dressing if it becomes wet or dirty. In most cases, the dressing can be removed after 48 hours.    If you have a drain, record the amount of drainage daily. You may also need to empty the drain and clean the attached tubing daily. Check with your healthcare provider if you can get your drain wet or if it needs to stay dry at all times.    Don t sit in a bathtub, pool, or hot tub until your incision is closed and any drains are removed.    When coughing or sneezing, hold a pillow firmly against your incision with both hands. This is called  splinting.  Doing this helps protect your incision and decreases belly discomfort.    Avoid picking, scratching, or pulling at your incision.    Don t use oils, or creams on your incision. Ask your healthcare provider before using lotions on your incision.  Follow-up  You will have one or more follow-up visits with your healthcare provider. These are needed to check how well you re healing. Your drain, stitches, or staples may also be removed during these visits.  When to call the healthcare provider  Call your healthcare provider right away if you have any of the following:    Fever of 100.4 F (38 C) or higher, or as advised by your healthcare provider    Chest pain or trouble breathing    Pain or tenderness in the leg    Increased pain, redness, swelling, bleeding, or  foul-smelling drainage at the incision site    Incision separates or comes apart    Problems with the drain if you have one    Pain or hardness in your belly that gets worse or isn t relieved by pain medicine    Nausea and vomiting that won t go away    Diarrhea that lasts more than 3 days    Constipation or inability to pass gas for more than 3 days    Dark-colored or bloody urine    Bright red or dark black stools    Itchy, swollen skin; skin rash   Date Last Reviewed: 7/1/2016 2000-2017 [employer]. [address] [place] [address]. All rights reserved. This information is not intended as a substitute for professional medical care. Always follow your healthcare professional's instructions.                Follow-ups after your visit        Your next 10 appointments already scheduled     Feb 27, 2018  1:05 PM CST   (Arrive by 12:50 PM)   RE-ESTABLISH NEW with Rubio Cronin MD   [facility] ([facility])    [address] [place] [id number] [phone]            Mar 09, 2018 10:30 AM CST   (Arrive by 10:15 AM)   RETURN HAND with Evin Zamorano MD   [facility] ([facility])    [address] [place] [id number] [phone]            Mar 14, 2018  8:40 AM CDT   (Arrive by 8:25 AM)   RETURN DIABETES with Russ Ponce MD   [facility] ([facility])    [address] [place] [id number] [phone]            Apr 06, 2018  8:30 AM CDT   (Arrive by 8:15 AM)   Return Visit with Jose Ramon Singh MD   [facility] ([facility])    [address] [place] [id number] [phone]              Who to contact     Please call your clinic at [phone] to:    Ask questions about your health    Make or cancel appointments    Discuss  your medicines    Learn about your test results    Speak to your doctor            Additional Information About Your Visit        [employer] Information     [employer] gives you secure access to your electronic health record. If you see a primary care provider, you can also send messages to your care team and make appointments. If you have questions, please call your primary care clinic.  If you do not have a primary care provider, please call [phone] and they will assist you.      [employer] is an electronic gateway that provides easy, online access to your medical records. With [employer], you can request a clinic appointment, read your test results, renew a prescription or communicate with your care team.     To access your existing account, please contact your [facility] or call [phone] for assistance.        Care EveryWhere ID     This is your Care EveryWhere ID. This could be used by other organizations to access your [facility] medical records  [id number]        Your Vitals Were     Pulse Respirations                102 20           Blood Pressure from Last 3 Encounters:   02/21/18 140/90   02/18/18 161/57   02/18/18 111/77    Weight from Last 3 Encounters:   02/18/18 79.8 kg (176 lb)   02/18/18 79.8 kg (176 lb)   02/16/18 76.7 kg (169 lb)              Today, you had the following     No orders found for display         Today's Medication Changes          These changes are accurate as of 2/21/18 10:42 AM.  If you have any questions, ask your nurse or doctor.               Start taking these medicines.        Dose/Directions    simethicone 125 MG Chew chewable tablet   Commonly known as:  GAS-X EXTRA STRENGTH   Used for:  Gas pain        Dose:  125 mg   Take 1 tablet (125 mg) by mouth 2 times daily   Quantity:  120 tablet   Refills:  0            Where to get your medicines      These medications were sent to [facility] [place], [place] - [address] [place] [address]   [address] [facility] [address]     Phone:  [phone]     simethicone 125 MG Chew chewable tablet                Primary Care Provider Office Phone # Fax #    Sharon Rosado [phone]       [address] [place] [address]  [facility] [address]        Equal Access to Services     RA JORDAN : Hadii aad ku hadasho Soomaali, waaxda luqadaha, qaybta kaalmada adeegyada, waxay idiin hayaan adeeg kharash la'aan . So [facility] [phone].    ATENCIÓN: Si habla español, tiene a kohli disposición servicios gratuitos de asistencia lingüística. Jimbojanet al [phone].    We comply with applicable federal civil rights laws and [place] laws. We do not discriminate on the basis of race, color, national origin, age, disability, sex, sexual orientation, or gender identity.            Thank you!     Thank you for choosing [facility] SURGICAL CARE OUTPATIENT  for your care. Our goal is always to provide you with excellent care. Hearing back from our patients is one way we can continue to improve our services. Please take a few minutes to complete the written survey that you may receive in the mail after your visit with us. Thank you!             Your Updated Medication List - Protect others around you: Learn how to safely use, store and throw away your medicines at www.disposemymeds.org.          This list is accurate as of 2/21/18 10:42 AM.  Always use your most recent med list.                   Brand Name Dispense Instructions for use Diagnosis    * acetaminophen 325 MG tablet    TYLENOL    100 tablet    Take 2 tablets (650 mg) by mouth every 6 hours    Abdominal abscess (H)       * acetaminophen 325 MG tablet    TYLENOL    100 tablet    Take 2 tablets (650 mg) by mouth every 4 hours    Abdominal abscess (H)       alum & mag hydroxide-simethicone 400-400-40 MG/5ML Susp suspension    MAALOX ADVANCED MAX ST    355 mL    Take 30 mLs by mouth every 4 hours as needed for indigestion        blood glucose  lancets standard    no brand specified    100 Box    Use to test blood sugar four times daily or as directed.    Type 2 diabetes mellitus without complication, without long-term current use of insulin (H)       * blood glucose monitoring meter device kit    no brand specified    1 kit    Use to test blood sugar four times daily or as directed.    Type 2 diabetes mellitus without complication, without long-term current use of insulin (H)       * blood glucose monitoring meter device kit    no brand specified    1 kit    Use to test blood sugar 4 times daily or as directed.    Type 2 diabetes mellitus with complication, without long-term current use of insulin (H)       * blood glucose monitoring test strip    no brand specified    100 each    Use to test blood sugars four times daily or as directed    Type 2 diabetes mellitus without complication, without long-term current use of insulin (H)       * blood glucose monitoring test strip    no brand specified    100 strip    1 strip by In Vitro route 4 times daily (before meals and nightly) Use to test blood sugars 4 times daily or as directed    Type 2 diabetes mellitus with complication, without long-term current use of insulin (H)       gabapentin 300 MG capsule    NEURONTIN    9 capsule    Take 1 capsule (300 mg) by mouth 3 times daily for 3 days    Herpes zoster without complication       glipiZIDE 5 MG tablet    GLUCOTROL    3 tablet    Take 0.5-1 tablets (2.5-5 mg) by mouth daily for 3 days    Type 2 diabetes mellitus with complication, without long-term current use of insulin (H)       insulin pen needle 31G X 8 MM    B-D U/F    100 each    Use 1 pen needles daily or as directed.    Type 2 diabetes mellitus without complication, without long-term current use of insulin (H)       * oxyCODONE IR 5 MG tablet    ROXICODONE    15 tablet    Take 1 tablet (5 mg) by mouth every 4 hours as needed for pain maximum 6 tablet(s) per day    Acute appendicitis, unspecified  acute appendicitis type       * oxyCODONE IR 5 MG tablet    ROXICODONE    10 tablet    Take 1-2 tablets (5-10 mg) by mouth every 4 hours as needed for other (pain control or improvement in physical function. Hold dose for analgesic side effects.)    Peritoneal abscess (H)       * polyethylene glycol Packet    MIRALAX/GLYCOLAX    10 packet    Take 17 g by mouth daily as needed for constipation    S/P appendectomy       * polyethylene glycol Packet    MIRALAX/GLYCOLAX    24 packet    Take 17 g by mouth 2 times daily    Constipation, unspecified constipation type       senna-docusate 8.6-50 MG per tablet    SENOKOT-S;PERICOLACE    50 tablet    Take 1 tablet by mouth 2 times daily as needed for constipation    Constipation, unspecified constipation type       simethicone 125 MG Chew chewable tablet    GAS-X EXTRA STRENGTH    120 tablet    Take 1 tablet (125 mg) by mouth 2 times daily    Gas pain       sodium chloride (PF) 0.9% PF flush     420 mL    Flush abdominal drain three times per day as instructed.    Peritoneal abscess (H)       * Notice:  This list has 10 medication(s) that are the same as other medications prescribed for you. Read the directions carefully, and ask your doctor or other care provider to review them with you.

## 2018-02-21 NOTE — LETTER
2/21/2018       RE: Andrew Lockwood  [facility] [address]     Dear Colleague,    Thank you for referring your patient, Andrew Lockwood, to the [facility] SURGICAL CARE OUTPATIENT at [facility]. Please see a copy of my visit note below.    Surgery Clinic Outpatient Progress Note  February 21, 2018  Andrew Lockwood  [id number]    S: Andrew Lockwood is a 50 year old male who presents to the clinic in follow-up of lap appy complicated by abscess requiring readmission, IR drain and IV antibiotics. The patient is eating normally and he denies any bowel complaints such as nausea, constipation or diarrhea. His main complaint and reason for visit today is gas pain.  ROS: As above, and he denies any other new complaints.  O:140/90 102 98%  Drain: The patient does not have any drains or tubes we are monitoring.  Physical Exam:  Gen: Alert, oriented, no distress  Psych: Normal affect  Neuro: No focal deficit  Resp: Quiet breathing  CV: Warm and well perfused.  Abd: soft, minimal diffuse tenderness with voluntary guarding.   Ext: No obvious deformities    A/P:   Andrew Lockwood is a 50 year old male s/p lap appy who is here complaining of gas pains.  He s eating normally and is only asking for a gas med prescription.  I don't think further testing is needed.  I will give him a Rx for simethicone as requested.  He can FU with PCP from now on.  We are happy to see him back if the PCP feels there is something surgical.    Total time 15 mins, the majority of which was spent in counseling.  Dawn [place] MD FACS  Associate Professor of Surgery  Pager [phone]           Again, thank you for allowing me to participate in the care of your patient.      Sincerely,    SOC PROVIDER

## 2018-02-21 NOTE — PATIENT INSTRUCTIONS
Discharged from surgery Clinic.  Follow-up with Primary Care for gas pains.    Discharge Instructions for Abdominal Surgery  Abdominal surgery is done through an incision in your belly. It may take a few weeks or longer to heal from the surgery. This sheet gives instructions on how to care for yourself once you re home.  Medicines  Here is what to expect:    You may be prescribed pain medicine. Do not wait until your pain becomes severe before taking the medicine. It may not work as well if you wait too long to take it between doses.    Most surgeons prescribe stool softeners along with opioid prescriptions. Take these as prescribed.     You may be prescribed antibiotics to help treat or prevent infection. Be sure to take all of the antibiotics even if you start to feel better.  Diet  Dietary tips include:    Follow any diet instructions given by your healthcare provider. You may need to start with liquids and then slowly add solid foods back into your diet.     If you have constipation, your healthcare provider may tell you to add more fiber to your diet. You may also be told to use a laxative or stool softener. These can often be bought over the counter.    Drink plenty of fluids.  Activity  Recommendations include the following:    Rest as often as needed.    Ask your healthcare provider when you can shower or bathe. Have someone nearby in case you need help.    Ask your family and friends to help with chores and errands.    Don t mow the lawn, vacuum, or do any strenuous activities for 4 to 6 weeks.    Avoid lifting anything over 10 pounds for 4 to 6 weeks.    Avoid driving until your healthcare provider says it s OK.    Walk as often as you feel able.    Do the coughing and breathing exercises you were taught in the hospital. If you were given an incentive spirometer to help with breathing, use it as directed. This is important and will help prevent lung infections.     Ask your healthcare provider when you can  return to work.  Incision and drain care  Do's and don'ts include the following:    Keep your incision clean and dry. It s OK to wash the skin around your incision with mild soap and water.    If you have a dressing over your incision, change it as you were told. Replace the dressing if it becomes wet or dirty. In most cases, the dressing can be removed after 48 hours.    If you have a drain, record the amount of drainage daily. You may also need to empty the drain and clean the attached tubing daily. Check with your healthcare provider if you can get your drain wet or if it needs to stay dry at all times.    Don t sit in a bathtub, pool, or hot tub until your incision is closed and any drains are removed.    When coughing or sneezing, hold a pillow firmly against your incision with both hands. This is called  splinting.  Doing this helps protect your incision and decreases belly discomfort.    Avoid picking, scratching, or pulling at your incision.    Don t use oils, or creams on your incision. Ask your healthcare provider before using lotions on your incision.  Follow-up  You will have one or more follow-up visits with your healthcare provider. These are needed to check how well you re healing. Your drain, stitches, or staples may also be removed during these visits.  When to call the healthcare provider  Call your healthcare provider right away if you have any of the following:    Fever of 100.4 F (38 C) or higher, or as advised by your healthcare provider    Chest pain or trouble breathing    Pain or tenderness in the leg    Increased pain, redness, swelling, bleeding, or foul-smelling drainage at the incision site    Incision separates or comes apart    Problems with the drain if you have one    Pain or hardness in your belly that gets worse or isn t relieved by pain medicine    Nausea and vomiting that won t go away    Diarrhea that lasts more than 3 days    Constipation or inability to pass gas for more than  3 days    Dark-colored or bloody urine    Bright red or dark black stools    Itchy, swollen skin; skin rash   Date Last Reviewed: 7/1/2016 2000-2017 [employer]. [address] [place] [address], [place] [address]. All rights reserved. This information is not intended as a substitute for professional medical care. Always follow your healthcare professional's instructions.

## 2018-02-22 ENCOUNTER — OFFICE VISIT (OUTPATIENT)
Dept: INFECTIOUS DISEASES | Facility: CLINIC | Age: 53
End: 2018-02-22
Attending: INTERNAL MEDICINE
Payer: COMMERCIAL

## 2018-02-22 VITALS
BODY MASS INDEX: 28.09 KG/M2 | WEIGHT: 168.8 LBS | TEMPERATURE: 98.2 F | HEART RATE: 111 BPM | OXYGEN SATURATION: 97 % | DIASTOLIC BLOOD PRESSURE: 80 MMHG | SYSTOLIC BLOOD PRESSURE: 113 MMHG

## 2018-02-22 DIAGNOSIS — B58.2 CNS TOXOPLASMOSIS (H): ICD-10-CM

## 2018-02-22 DIAGNOSIS — Z00.00 PREVENTATIVE HEALTH CARE: ICD-10-CM

## 2018-02-22 DIAGNOSIS — Z21 HUMAN IMMUNODEFICIENCY VIRUS I INFECTION (H): Primary | ICD-10-CM

## 2018-02-22 DIAGNOSIS — E11.65 TYPE 2 DIABETES MELLITUS WITH HYPERGLYCEMIA, WITHOUT LONG-TERM CURRENT USE OF INSULIN (H): ICD-10-CM

## 2018-02-22 LAB — GLUCOSE BLDC GLUCOMTR-MCNC: 129 MG/DL (ref 70–99)

## 2018-02-22 PROCEDURE — 87491 CHLMYD TRACH DNA AMP PROBE: CPT | Performed by: INTERNAL MEDICINE

## 2018-02-22 PROCEDURE — 82962 GLUCOSE BLOOD TEST: CPT

## 2018-02-22 PROCEDURE — 87591 N.GONORRHOEAE DNA AMP PROB: CPT | Performed by: INTERNAL MEDICINE

## 2018-02-22 PROCEDURE — 36416 COLLJ CAPILLARY BLOOD SPEC: CPT | Mod: ZF

## 2018-02-22 PROCEDURE — G0463 HOSPITAL OUTPT CLINIC VISIT: HCPCS | Mod: ZF

## 2018-02-22 ASSESSMENT — PAIN SCALES - GENERAL: PAINLEVEL: NO PAIN (0)

## 2018-02-22 NOTE — MR AVS SNAPSHOT
After Visit Summary   2/22/2018    Andrew Lockwood    MRN: [id number]           Patient Information     Date Of Birth          11/27/1967        Visit Information        Provider Department      2/22/2018 9:30 AM Sharon Rosado MD [facility] and Infectious Diseases        Today's Diagnoses     Human immunodeficiency virus I infection (H)    -  1    [facility] care        Type 2 diabetes mellitus with hyperglycemia, without long-term current use of insulin (H)        CNS toxoplasmosis (H)        Abdominal abscess (H)           Follow-ups after your visit        Your next 10 appointments already scheduled     Mar 06, 2018  1:05 PM CST   (Arrive by 12:50 PM)   Return Visit with Rubio Cronin MD   [facility] ([facility])    [address] [place] [address] [id number] [phone]            Mar 09, 2018 10:30 AM CST   (Arrive by 10:15 AM)   RETURN HAND with Evin Zamorano MD   [facility] ([facility])    [address] [place] [address] [id number] [phone]            Mar 14, 2018  8:40 AM CDT   (Arrive by 8:25 AM)   RETURN DIABETES with Russ Ponce MD   [facility] ([facility])    [address] [place] [id number] [phone]            Apr 06, 2018  8:30 AM CDT   (Arrive by 8:15 AM)   Return Visit with Jose Ramon Singh MD   [facility] ([facility])    [address] [place] [id number] [phone]              Who to contact     If you have questions or need follow up information about today's clinic visit or your schedule please contact [facility] AND INFECTIOUS DISEASES directly at [phone].  Normal or non-critical lab and imaging results will be communicated to you by MyChart, letter or  phone within 4 business days after the clinic has received the results. If you do not hear from us within 7 days, please contact the clinic through [employer] or phone. If you have a critical or abnormal lab result, we will notify you by phone as soon as possible.  Submit refill requests through Kaiam or call your pharmacy and they will forward the refill request to us. Please allow 3 business days for your refill to be completed.          Additional Information About Your Visit        [employer] Information     Kaiam gives you secure access to your electronic health record. If you see a primary care provider, you can also send messages to your care team and make appointments. If you have questions, please call your primary care clinic.  If you do not have a primary care provider, please call [phone] and they will assist you.        Care EveryWhere ID     This is your Care EveryWhere ID. This could be used by other organizations to access your [facility] medical records  [id number]        Your Vitals Were     Pulse Temperature Pulse Oximetry BMI (Body Mass Index)          111 98.2  F (36.8  C) (Oral) 97% 28.09 kg/m2         Blood Pressure from Last 3 Encounters:   02/22/18 113/80   02/21/18 140/90   02/18/18 161/57    Weight from Last 3 Encounters:   02/22/18 76.6 kg (168 lb 12.8 oz)   02/18/18 79.8 kg (176 lb)   02/18/18 79.8 kg (176 lb)              We Performed the Following     Chlamydia PCR (set at Rectal) (PLQ942)     Chlamydia PCR (set at Throat) ([id number])     Glucose by meter     Gonorrhea PCR (set at Rectal) ([id number])     Gonorrhea PCR (set at Throat) ([id number])        Primary Care Provider Office Phone # Fax #    Rubio Cronin [phone]       [address] [address]        Equal Access to Services     RA JORDAN : Apollo Cody, maverick centeno, qaiker reyna, dez salazar. So [facility]  [phone].    ATENCIÓN: Si mac piedra, tiene a kohli disposición servicios gratuitos de asistencia lingüística. Gurdeep davila [phone].    We comply with applicable federal civil rights laws and [place] laws. We do not discriminate on the basis of race, color, national origin, age, disability, sex, sexual orientation, or gender identity.            Thank you!     Thank you for choosing [facility] AND INFECTIOUS DISEASES  for your care. Our goal is always to provide you with excellent care. Hearing back from our patients is one way we can continue to improve our services. Please take a few minutes to complete the written survey that you may receive in the mail after your visit with us. Thank you!             Your Updated Medication List - Protect others around you: Learn how to safely use, store and throw away your medicines at www.disposemymeds.org.          This list is accurate as of 2/22/18 11:59 PM.  Always use your most recent med list.                   Brand Name Dispense Instructions for use Diagnosis    acetaminophen 325 MG tablet    TYLENOL    100 tablet    Take 2 tablets (650 mg) by mouth every 4 hours    Abdominal abscess (H)       alum & mag hydroxide-simethicone 400-400-40 MG/5ML Susp suspension    MAALOX ADVANCED MAX ST    355 mL    Take 30 mLs by mouth every 4 hours as needed for indigestion        blood glucose lancets standard    no brand specified    100 Box    Use to test blood sugar four times daily or as directed.    Type 2 diabetes mellitus without complication, without long-term current use of insulin (H)       * blood glucose monitoring meter device kit    no brand specified    1 kit    Use to test blood sugar four times daily or as directed.    Type 2 diabetes mellitus without complication, without long-term current use of insulin (H)       * blood glucose monitoring meter device kit    no brand specified    1 kit    Use to test blood sugar 4 times daily or as directed.     Type 2 diabetes mellitus with complication, without long-term current use of insulin (H)       * blood glucose monitoring test strip    no brand specified    100 each    Use to test blood sugars four times daily or as directed    Type 2 diabetes mellitus without complication, without long-term current use of insulin (H)       * blood glucose monitoring test strip    no brand specified    100 strip    1 strip by In Vitro route 4 times daily (before meals and nightly) Use to test blood sugars 4 times daily or as directed    Type 2 diabetes mellitus with complication, without long-term current use of insulin (H)       gabapentin 300 MG capsule    NEURONTIN    9 capsule    Take 1 capsule (300 mg) by mouth 3 times daily for 3 days    Herpes zoster without complication       insulin pen needle 31G X 8 MM    B-D U/F    100 each    Use 1 pen needles daily or as directed.    Type 2 diabetes mellitus without complication, without long-term current use of insulin (H)       simethicone 125 MG Chew chewable tablet    GAS-X EXTRA STRENGTH    120 tablet    Take 1 tablet (125 mg) by mouth 2 times daily    Gas pain       TRULICITY 1.5 MG/0.5ML pen   Generic drug:  dulaglutide      Inject 1.5 mg Subcutaneous every 7 days    Human immunodeficiency virus I infection (H), Preventative health care       * Notice:  This list has 4 medication(s) that are the same as other medications prescribed for you. Read the directions carefully, and ask your doctor or other care provider to review them with you.

## 2018-02-22 NOTE — LETTER
2/22/2018      RE: Andrew Lockwood  GENERAL DELIVERY  [facility] [address]       [facility] - HIV Progress Note  Dr. Sharon Rosado, [facility], [facility], [address] [place] [address], Sixth Floor, [address]  [place] [address]  Patient:  Andrew Lockwood, Date of birth 11/27/1967, Medical record number [id number]  Date of Visit: Feb 22, 2018         Assessment and Recommendations:     HIV, History of Toxoplasmosis encephalitis with ongoing MRI abnormalities  Continue on Genvoya. He is tolerating this without issues and has been virally suppressed.  Mr. Lockwood's MRI finding are consistent with old Toxo scarring.  New lesions or progressive disease is also not likely given his CD4 count and he has until very recently been on Bactrim. Mr. Lockwood is also not experiencing the dizziness that precipitated this evaluation today.     DMII   Following with endocrine.  Changed to Trulicity. Will check microalbumin in urine at next appointment.    Symptoms of Gender Dysphoria  We have previously discussed a referral to the Program on Human Sexuality.  I will have our , Simon Cadena pursue this consultation.  Today he is not concerned about hormonal therapy, but is alarmed because he had a sexual encounter involving a woman and he has never previously been aroused by a woman.     Abdominal Abscess  His abdomen appears well healed. He can continue off antibiotics.    Preventative health care  Cardiovascular Juan Jose -               Lipids - CHOL 180, LDL 72 (8/18/2016)              Blood Pressure -  /80. Will continue to monitor.   Cancer Screening              Colon - Up to date. [facility]   Anal - Previous anal pap with insufficient cells.  He has been referred to colorectal, but he has not yet made an appointment  Immunizations              Hepatitis A - immune, serology 4/13/15              Hepatitis B -  6/16/2016, 3/10/2016, 8/31/2017              Influenza - Up To date              Pneumovax/Prevnar - Up to date               Tdap - 6/8/14 per [facility]              Menactra - Up to date.    Renal Health -  UA without proteinuria and creatinine normal in 12/2016.  Will check microalbumin at next visit  Sexually Transmitted Infection Risk and Screening              Gonorrhea and Chlamydia - GC/chlamydia negative on 3/2017, will check today              Syphilis - negative 3/2017, will check today.     Sharon Rosado MD  Division of Infectious Diseases and International Medicine  [phone]         History of Infectious Disease Illness:   Mr. Lockwood is a 50 year old man who presents with routine follow up for HIV and abdominal abscess.  He primarily follows up today because of a recent hospitalization for dizziness.  He had an MRI of the brain done which showed a non-enhancing lesion consistent with his previous toxoplasmosis.  We stopped his Bactrim a couple months ago because his CD4 count had been over 100 since 2016 and over 200 since August 2017.  He has been virally suppressed since 2016.  Today he is not having issues with the dizziness.  He is primarily concerned today because he is concerned about his sexual orientation.  Andrew has always been attracted to me, but he had a sexual encounter with a couple - one man and one woman, and he was aroused by the woman.  He is worried about what this means and would like to talk to someone in our human sexuality clinic.  In terms of his abdomen, he is doing well.  He recently had appendicitis complicated by abscess.  His drain is now out and he is off antibiotics and doing well.  No abdominal pain.  No diarrhea.  No fevers.  No rashes.         HIV History:   Date of Diagnosis: 4/2015  Approximated time of transmission:2008  CD4 Josue: 24  Viral Load at Diagnosis: 171,654  Opportunistic Infections:Toxoplasmosis  CMV Status: IgG+ (4/12/15)  Toxo Status: + (4/12/15),  Had CNS toxoplasmosis as his primary illness at the time of diagnosis  HLA  Status: 4/25/15 negative  Tuberculosis Screening: Exposure to a friend for treated for active TB several years ago. They did not live together.  Historical use of ARVs: Triumeq, Genvoya  Historical Resistance Mutations: Susceptible        Past Medical and Surgical History:     Past Medical History:   Diagnosis Date     AIDS (H)      Allergic rhinitis due to other allergen     DNS     Chronic abdominal pain      CNS toxoplasmosis (H)      Diabetes type 2, controlled (H)      GERD (gastroesophageal reflux disease)      HIV (human immunodeficiency virus infection) (H)      Periungual wart      Sleep apnea     doesn't use CPAP       Past Surgical History:   Procedure Laterality Date     C NONSPECIFIC PROCEDURE      right forearm fracture     COLONOSCOPY Left 1/22/2016    Procedure: COMBINED COLONOSCOPY, SINGLE OR MULTIPLE BIOPSY/POLYPECTOMY BY BIOPSY;  Surgeon: Clark Saini MD;  Location: [facility]     HC EXPLORE UNDESC TESTIS,INGUIN/SCROTAL       LAPAROSCOPIC APPENDECTOMY N/A 1/31/2018    Procedure: LAPAROSCOPIC APPENDECTOMY;  LAPAROSCOPIC APPENDECTOMY;  Surgeon: Dawn Holt MD;  Location: [facility] OR     LAPAROSCOPY DIAGNOSTIC (GENERAL) N/A 7/26/2016    Procedure: LAPAROSCOPY DIAGNOSTIC (GENERAL);  Surgeon: Susannah Arriaga MD;  Location: [facility] OR     OPTICAL TRACKING SYSTEM CRANIOTOMY, EXCISE TUMOR, COMBINED Left 4/10/2015    Procedure: COMBINED OPTICAL TRACKING SYSTEM CRANIOTOMY, EXCISE TUMOR;  Surgeon: Mirlande Colmenares MD;  Location: [facility] OR     REPAIR GAMEKEEPER'S THUMB Right 12/2/2016    Procedure: REPAIR LIGAMENT ULNAR COLLATERAL THUMB (GAMEKEEPER'S);  Surgeon: Evin Zamorano MD;  Location: [facility] OR           Family History:     Family History   Problem Relation Age of Onset     DIABETES Brother      DIABETES Father      Alzheimer Disease Father      Unknown/Adopted Mother      DIABETES Paternal Grandfather       CANCER No family hx of      no skin cancer     Skin Cancer No family hx of      no famiy hx of skin cancer           Social History:     Social History   Substance Use Topics     Smoking status: Current Every Day Smoker     Packs/day: 0.20     Last attempt to quit: 10/28/2016     Smokeless tobacco: Former User      Comment: 4 cigarettes     Alcohol use No      Comment: Last etoh in 2007     Social History     Social History Narrative    Born in [facility].  [place]ame [place] the [place] in 1993.  Last traveled to visit family in 2008.                 Review of Systems:   CONSTITUTIONAL: negative fevers or chills   EYES: negative for icterus  ENT: negative for hearing loss, tinnitus, sore throat   RESPIRATORY: negative for cough, sputum or dyspnea   CARDIOVASCULAR: negative for chest pain, palpitations   GASTROINTESTINAL: negative for nausea, vomiting, diarrhea or constipation   GENITOURINARY: negative for dysuria or hematuria - no genital lesions  HEME: No easy bruising   INTEGUMENT: negative for rash or pruritus  NEURO: Negative for headache         Current Medications:     Current Outpatient Prescriptions   Medication Sig Dispense Refill     dulaglutide (TRULICITY) 1.5 MG/0.5ML pen Inject 1.5 mg Subcutaneous every 7 days       simethicone (GAS-X EXTRA STRENGTH) 125 MG CHEW chewable tablet Take 1 tablet (125 mg) by mouth 2 times daily 120 tablet 0     gabapentin (NEURONTIN) 300 MG capsule Take 1 capsule (300 mg) by mouth 3 times daily for 3 days 9 capsule 0     insulin pen needle (B-D U/F) 31G X 8 MM Use 1 pen needles daily or as directed. 100 each 0     acetaminophen (TYLENOL) 325 MG tablet Take 2 tablets (650 mg) by mouth every 4 hours 100 tablet 1     blood glucose monitoring (NO BRAND SPECIFIED) meter device kit Use to test blood sugar 4 times daily or as directed. 1 kit 0     blood glucose monitoring (NO BRAND SPECIFIED) test strip 1 strip by In Vitro route 4 times daily (before meals and nightly) Use to test blood  sugars 4 times daily or as directed 100 strip 11     blood glucose monitoring (NO BRAND SPECIFIED) test strip Use to test blood sugars four times daily or as directed 100 each 5     alum & mag hydroxide-simethicone (MAALOX ADVANCED MAX ST) 400-400-40 MG/5ML SUSP suspension Take 30 mLs by mouth every 4 hours as needed for indigestion (Patient not taking: Reported on 2/22/2018) 355 mL 0     blood glucose monitoring (NO BRAND SPECIFIED) meter device kit Use to test blood sugar four times daily or as directed. (Patient not taking: Reported on 2/22/2018) 1 kit 0     blood glucose (NO BRAND SPECIFIED) lancets standard Use to test blood sugar four times daily or as directed. (Patient not taking: Reported on 2/22/2018) 100 Box 5            Immunization History:     Immunization History   Administered Date(s) Administered     HepB 03/10/2016, 06/16/2016     HepB-Adult 08/31/2017     Influenza (IIV3) PF 10/17/2016     Influenza Vaccine IM 3yrs+ 4 Valent IIV4 12/22/2015, 12/14/2017     Mantoux Tuberculin Skin Test 04/12/2015     Meningococcal (Menactra ) 08/31/2015, 03/10/2016     Pneumo Conj 13-V (2010&after) 03/10/2016     Pneumococcal 23 valent 06/16/2016     TDAP Vaccine (Boostrix) 10/17/2016            Allergies:     Allergies   Allergen Reactions     Metformin      Abdominal pain     Milk [Lac Bovis] Hives     Tylenol [Acetaminophen] Itching            Physical Exam:   /80  Pulse 111  Temp 98.2  F (36.8  C) (Oral)  Wt 76.6 kg (168 lb 12.8 oz)  SpO2 97%  BMI 28.09 kg/m2  Physical Examination:  GENERAL:  well-developed, well-nourished, seated in no acute distress.  HEENT:  Head is normocephalic, atraumatic   EYES:  Eyes have anicteric sclerae without conjunctival injection   NECK: Supple no LAD  CV: RRR no M/R/G  RESP: BCTA No W/R/R  ABD: Soft NTND,  Well healing wound at drain site  SKIN:  No acute rashes. .   NEUROLOGIC:  Grossly nonfocal. Active x4 extremities          Laboratory Data:     CD4 Count  Absolute  CD4   Date Value Ref Range Status   12/12/2017 225 (L) 441 - 2156 cells/uL Final     CD4 Kaplan T   Date Value Ref Range Status   12/12/2017 10 (L) 28 - 63 % Final     CD4:CD8 Ratio   Date Value Ref Range Status   12/12/2017 0.29 (L) 1.40 - 2.60 Final       HIV-1 RNA Quantitative:  HIV-1 RNA Quant Result   Date Value Ref Range Status   12/12/2017 52 (A) HIVND^HIV-1 RNA Not Detected [Copies]/mL Final     Comment:     The DAMON AmpliPrep/DAMON TaqMan HIV-1 test is an FDA-approved in vitro   nucleic acid amplification test for the quantitation of HIV-1 RNA in human   plasma (EDTA plasma) using the DAMON AmpliPrep instrument for automated viral   nucleic acid extraction and the DAMON TaqMan Analyzer or DAMON TaqMan for   automated Real Time PCR amplification and detection of the viral nucleic acid   target.  Titer results are reported in copies/ml. This assay is intended for use in   conjunction with clinical presentation and other laboratory markers of disease   prognosis and for use as an aid in assessing viral response to antiretroviral   treatment as measured by changes in plasma HIV-1 RNA levels. This test should   not be used as a donor screening test to confirm the presence of HIV-1   infection.         Metabolic Studies       Sodium   Date Value Ref Range Status   02/18/2018 142 133 - 144 mmol/L Final   02/18/2018 141 133 - 144 mmol/L Final     Potassium   Date Value Ref Range Status   02/18/2018 3.4 3.4 - 5.3 mmol/L Final   02/18/2018 3.4 3.4 - 5.3 mmol/L Final     Chloride   Date Value Ref Range Status   02/18/2018 109 94 - 109 mmol/L Final   02/18/2018 106 94 - 109 mmol/L Final     Carbon Dioxide   Date Value Ref Range Status   02/18/2018 25 20 - 32 mmol/L Final   02/18/2018 22 20 - 32 mmol/L Final     Anion Gap   Date Value Ref Range Status   02/18/2018 7 3 - 14 mmol/L Final   02/18/2018 12 3 - 14 mmol/L Final     Urea Nitrogen   Date Value Ref Range Status   02/18/2018 9 7 - 30 mg/dL Final   02/18/2018 13 7  - 30 mg/dL Final     Creatinine   Date Value Ref Range Status   02/18/2018 0.82 0.66 - 1.25 mg/dL Final   02/18/2018 0.77 0.66 - 1.25 mg/dL Final     GFR Estimate   Date Value Ref Range Status   02/18/2018 >90 >60 mL/min/1.7m2 Final     Comment:     Non  GFR Calc   02/18/2018 >90 >60 mL/min/1.7m2 Final     Comment:     Non  GFR Calc     Glucose   Date Value Ref Range Status   02/18/2018 60 (L) 70 - 99 mg/dL Final   02/18/2018 219 (H) 70 - 99 mg/dL Final     Hemoglobin A1C   Date Value Ref Range Status   08/11/2017 6.0 4.3 - 6.0 % Final     Calcium   Date Value Ref Range Status   02/18/2018 8.8 8.5 - 10.1 mg/dL Final   02/18/2018 8.8 8.5 - 10.1 mg/dL Final     Phosphorus   Date Value Ref Range Status   11/07/2017 2.6 2.5 - 4.5 mg/dL Final   12/26/2016 2.9 2.5 - 4.5 mg/dL Final     Magnesium   Date Value Ref Range Status   02/15/2018 2.1 1.6 - 2.3 mg/dL Final   11/07/2017 2.0 1.6 - 2.3 mg/dL Final     Lactic Acid   Date Value Ref Range Status   01/24/2018 1.1 0.4 - 2.0 mmol/L Final   01/24/2018 1.3 0.7 - 2.1 mmol/L Final       Hepatic Studies    Bilirubin Total   Date Value Ref Range Status   02/18/2018 0.3 0.2 - 1.3 mg/dL Final   02/10/2018 0.2 0.2 - 1.3 mg/dL Final     Alkaline Phosphatase   Date Value Ref Range Status   02/18/2018 125 40 - 150 U/L Final   02/10/2018 148 40 - 150 U/L Final     Albumin   Date Value Ref Range Status   02/18/2018 3.5 3.4 - 5.0 g/dL Final   02/10/2018 2.7 (L) 3.4 - 5.0 g/dL Final     AST   Date Value Ref Range Status   02/18/2018 26 0 - 45 U/L Final   02/10/2018 16 0 - 45 U/L Final     ALT   Date Value Ref Range Status   02/18/2018 30 0 - 70 U/L Final   02/10/2018 21 0 - 70 U/L Final       Hematology Studies      WBC   Date Value Ref Range Status   02/18/2018 8.1 4.0 - 11.0 10e9/L Final   02/18/2018 7.0 4.0 - 11.0 10e9/L Final     Absolute Neutrophil   Date Value Ref Range Status   02/18/2018 3.9 1.6 - 8.3 10e9/L Final   02/18/2018 4.0 1.6 - 8.3 10e9/L  Final     Absolute Lymphocytes   Date Value Ref Range Status   02/18/2018 3.3 0.8 - 5.3 10e9/L Final   02/18/2018 2.3 0.8 - 5.3 10e9/L Final     Absolute Monocytes   Date Value Ref Range Status   02/18/2018 0.6 0.0 - 1.3 10e9/L Final   02/18/2018 0.4 0.0 - 1.3 10e9/L Final     Absolute Eosinophils   Date Value Ref Range Status   02/18/2018 0.1 0.0 - 0.7 10e9/L Final   02/18/2018 0.1 0.0 - 0.7 10e9/L Final     Hemoglobin   Date Value Ref Range Status   02/18/2018 13.6 13.3 - 17.7 g/dL Final   02/18/2018 12.8 (L) 13.3 - 17.7 g/dL Final     Hematocrit   Date Value Ref Range Status   02/18/2018 40.6 40.0 - 53.0 % Final   02/18/2018 38.7 (L) 40.0 - 53.0 % Final     Platelet Count   Date Value Ref Range Status   02/18/2018 410 150 - 450 10e9/L Final   02/18/2018 471 (H) 150 - 450 10e9/L Final       Hepatitis B Testing     Recent Labs   Lab Test  04/13/15   0816   HBCAB  Nonreactive   HEPBANG  Nonreactive   HBCM  Nonreactive   A nonreactive result suggests lack of recent exposure to the virus in the   preceding 6 months.     HBEABY  Negative  Reference range: Negative  (Note)  Performed by [facility],  [address] [place] [address] [phone]  [email or web address], Nayan Corley MD, Lab. Director     HBEAGN  Negative  Reference range: Negative  (Note)  Performed by [facility],  [address] [place] [address] [phone]  [email or web address], Nayan Corley MD, Lab. Director         Hepatitis C Testing     Hepatitis C Antibody   Date Value Ref Range Status   03/02/2017  NR Final    Nonreactive   Assay performance characteristics have not been established for newborns,   infants, and children     04/13/2015  NR Final    Nonreactive   Assay performance characteristics have not been established for newborns,   infants, and children       Imaging:  No results found for this or any previous visit (from the past 744 hour(s)).      Sharon Rosado MD

## 2018-02-22 NOTE — PROGRESS NOTES
[facility] - HIV Progress Note  Dr. Sharon Rosado, [facility], [facility], [address] [place] [address], Sixth Floor, [address]  [place] [address]  Patient:  Andrew Lockwood, Date of birth 11/27/1967, Medical record number [id number]  Date of Visit: Feb 22, 2018         Assessment and Recommendations:     HIV, History of Toxoplasmosis encephalitis with ongoing MRI abnormalities  Continue on Genvoya. He is tolerating this without issues and has been virally suppressed.  Mr. Lockwood's MRI finding are consistent with old Toxo scarring.  New lesions or progressive disease is also not likely given his CD4 count and he has until very recently been on Bactrim. Mr. Lockwood is also not experiencing the dizziness that precipitated this evaluation today.     DMII   Following with endocrine.  Changed to Trulicity. Will check microalbumin in urine at next appointment.    Symptoms of Gender Dysphoria  We have previously discussed a referral to the Program on Human Sexuality.  I will have our , Simon Cadena pursue this consultation.  Today he is not concerned about hormonal therapy, but is alarmed because he had a sexual encounter involving a woman and he has never previously been aroused by a woman.     Abdominal Abscess  His abdomen appears well healed. He can continue off antibiotics.    Preventative health care  Cardiovascular Juan Jose -               Lipids - CHOL 180, LDL 72 (8/18/2016)              Blood Pressure -  /80. Will continue to monitor.   Cancer Screening              Colon - Up to date. [facility]   Anal - Previous anal pap with insufficient cells.  He has been referred to colorectal, but he has not yet made an appointment  Immunizations              Hepatitis A - immune, serology 4/13/15              Hepatitis B - 6/16/2016, 3/10/2016, 8/31/2017              Influenza - Up To  date              Pneumovax/Prevnar - Up to date               Tdap - 6/8/14 per [facility]              Menactra - Up to date.    Renal Health -  UA without proteinuria and creatinine normal in 12/2016.  Will check microalbumin at next visit  Sexually Transmitted Infection Risk and Screening              Gonorrhea and Chlamydia - GC/chlamydia negative on 3/2017, will check today              Syphilis - negative 3/2017, will check today.     Sharon Rosado MD  Division of Infectious Diseases and International Medicine  [phone]         History of Infectious Disease Illness:   Mr. Lockwood is a 50 year old man who presents with routine follow up for HIV and abdominal abscess.  He primarily follows up today because of a recent hospitalization for dizziness.  He had an MRI of the brain done which showed a non-enhancing lesion consistent with his previous toxoplasmosis.  We stopped his Bactrim a couple months ago because his CD4 count had been over 100 since 2016 and over 200 since August 2017.  He has been virally suppressed since 2016.  Today he is not having issues with the dizziness.  He is primarily concerned today because he is concerned about his sexual orientation.  Andrew has always been attracted to me, but he had a sexual encounter with a couple - one man and one woman, and he was aroused by the woman.  He is worried about what this means and would like to talk to someone in our human sexuality clinic.  In terms of his abdomen, he is doing well.  He recently had appendicitis complicated by abscess.  His drain is now out and he is off antibiotics and doing well.  No abdominal pain.  No diarrhea.  No fevers.  No rashes.         HIV History:   Date of Diagnosis: 4/2015  Approximated time of transmission:2008  CD4 Josue: 24  Viral Load at Diagnosis: 171,654  Opportunistic Infections:Toxoplasmosis  CMV Status: IgG+ (4/12/15)  Toxo Status: + (4/12/15), Had CNS toxoplasmosis as his primary illness at the time of  diagnosis  HLA  Status: 4/25/15 negative  Tuberculosis Screening: Exposure to a friend for treated for active TB several years ago. They did not live together.  Historical use of ARVs: Triumeq, Genvoya  Historical Resistance Mutations: Susceptible        Past Medical and Surgical History:     Past Medical History:   Diagnosis Date     AIDS (H)      Allergic rhinitis due to other allergen     DNS     Chronic abdominal pain      CNS toxoplasmosis (H)      Diabetes type 2, controlled (H)      GERD (gastroesophageal reflux disease)      HIV (human immunodeficiency virus infection) (H)      Periungual wart      Sleep apnea     doesn't use CPAP       Past Surgical History:   Procedure Laterality Date     C NONSPECIFIC PROCEDURE      right forearm fracture     COLONOSCOPY Left 1/22/2016    Procedure: COMBINED COLONOSCOPY, SINGLE OR MULTIPLE BIOPSY/POLYPECTOMY BY BIOPSY;  Surgeon: Clark Saini MD;  Location: [facility]     HC EXPLORE UNDESC TESTIS,INGUIN/SCROTAL       LAPAROSCOPIC APPENDECTOMY N/A 1/31/2018    Procedure: LAPAROSCOPIC APPENDECTOMY;  LAPAROSCOPIC APPENDECTOMY;  Surgeon: Dawn Holt MD;  Location: [facility]     LAPAROSCOPY DIAGNOSTIC (GENERAL) N/A 7/26/2016    Procedure: LAPAROSCOPY DIAGNOSTIC (GENERAL);  Surgeon: Susannah Arriaga MD;  Location: [facility] OR     OPTICAL TRACKING SYSTEM CRANIOTOMY, EXCISE TUMOR, COMBINED Left 4/10/2015    Procedure: COMBINED OPTICAL TRACKING SYSTEM CRANIOTOMY, EXCISE TUMOR;  Surgeon: Mirlande Colmenares MD;  Location: [facility] OR     REPAIR GAMEKEEPER'S THUMB Right 12/2/2016    Procedure: REPAIR LIGAMENT ULNAR COLLATERAL THUMB (GAMEKEEPER'S);  Surgeon: Evin Zamorano MD;  Location: [facility]           Family History:     Family History   Problem Relation Age of Onset     DIABETES Brother      DIABETES Father      Alzheimer Disease Father      Unknown/Adopted Mother      DIABETES Paternal Grandfather      CANCER No family hx of      no skin cancer     Skin  Cancer No family hx of      no famiy hx of skin cancer           Social History:     Social History   Substance Use Topics     Smoking status: Current Every Day Smoker     Packs/day: 0.20     Last attempt to quit: 10/28/2016     Smokeless tobacco: Former User      Comment: 4 cigarettes     Alcohol use No      Comment: Last etoh in 2007     Social History     Social History Narrative    Born in [facility].  [place]ame to the [place] in 1993.  Last traveled to visit family in 2008.                 Review of Systems:   CONSTITUTIONAL: negative fevers or chills   EYES: negative for icterus  ENT: negative for hearing loss, tinnitus, sore throat   RESPIRATORY: negative for cough, sputum or dyspnea   CARDIOVASCULAR: negative for chest pain, palpitations   GASTROINTESTINAL: negative for nausea, vomiting, diarrhea or constipation   GENITOURINARY: negative for dysuria or hematuria - no genital lesions  HEME: No easy bruising   INTEGUMENT: negative for rash or pruritus  NEURO: Negative for headache         Current Medications:     Current Outpatient Prescriptions   Medication Sig Dispense Refill     dulaglutide (TRULICITY) 1.5 MG/0.5ML pen Inject 1.5 mg Subcutaneous every 7 days       simethicone (GAS-X EXTRA STRENGTH) 125 MG CHEW chewable tablet Take 1 tablet (125 mg) by mouth 2 times daily 120 tablet 0     gabapentin (NEURONTIN) 300 MG capsule Take 1 capsule (300 mg) by mouth 3 times daily for 3 days 9 capsule 0     insulin pen needle (B-D U/F) 31G X 8 MM Use 1 pen needles daily or as directed. 100 each 0     acetaminophen (TYLENOL) 325 MG tablet Take 2 tablets (650 mg) by mouth every 4 hours 100 tablet 1     blood glucose monitoring (NO BRAND SPECIFIED) meter device kit Use to test blood sugar 4 times daily or as directed. 1 kit 0     blood glucose monitoring (NO BRAND SPECIFIED) test strip 1 strip by In Vitro route 4 times daily (before meals and nightly) Use to test blood sugars 4 times daily or as directed 100 strip 11      blood glucose monitoring (NO BRAND SPECIFIED) test strip Use to test blood sugars four times daily or as directed 100 each 5     alum & mag hydroxide-simethicone (MAALOX ADVANCED MAX ST) 400-400-40 MG/5ML SUSP suspension Take 30 mLs by mouth every 4 hours as needed for indigestion (Patient not taking: Reported on 2/22/2018) 355 mL 0     blood glucose monitoring (NO BRAND SPECIFIED) meter device kit Use to test blood sugar four times daily or as directed. (Patient not taking: Reported on 2/22/2018) 1 kit 0     blood glucose (NO BRAND SPECIFIED) lancets standard Use to test blood sugar four times daily or as directed. (Patient not taking: Reported on 2/22/2018) 100 Box 5            Immunization History:     Immunization History   Administered Date(s) Administered     HepB 03/10/2016, 06/16/2016     HepB-Adult 08/31/2017     Influenza (IIV3) PF 10/17/2016     Influenza Vaccine IM 3yrs+ 4 Valent IIV4 12/22/2015, 12/14/2017     Mantoux Tuberculin Skin Test 04/12/2015     Meningococcal (Menactra ) 08/31/2015, 03/10/2016     Pneumo Conj 13-V (2010&after) 03/10/2016     Pneumococcal 23 valent 06/16/2016     TDAP Vaccine (Boostrix) 10/17/2016            Allergies:     Allergies   Allergen Reactions     Metformin      Abdominal pain     Milk [Lac Bovis] Hives     Tylenol [Acetaminophen] Itching            Physical Exam:   /80  Pulse 111  Temp 98.2  F (36.8  C) (Oral)  Wt 76.6 kg (168 lb 12.8 oz)  SpO2 97%  BMI 28.09 kg/m2  Physical Examination:  GENERAL:  well-developed, well-nourished, seated in no acute distress.  HEENT:  Head is normocephalic, atraumatic   EYES:  Eyes have anicteric sclerae without conjunctival injection   NECK: Supple no LAD  CV: RRR no M/R/G  RESP: BCTA No W/R/R  ABD: Soft NTND,  Well healing wound at drain site  SKIN:  No acute rashes. .   NEUROLOGIC:  Grossly nonfocal. Active x4 extremities          Laboratory Data:     CD4 Count  Absolute CD4   Date Value Ref Range Status   12/12/2017 225  (L) 441 - 2156 cells/uL Final     CD4 [place] T   Date Value Ref Range Status   12/12/2017 10 (L) 28 - 63 % Final     CD4:CD8 Ratio   Date Value Ref Range Status   12/12/2017 0.29 (L) 1.40 - 2.60 Final       HIV-1 RNA Quantitative:  HIV-1 RNA Quant Result   Date Value Ref Range Status   12/12/2017 52 (A) HIVND^HIV-1 RNA Not Detected [Copies]/mL Final     Comment:     The DAMON AmpliPrep/DAMON TaqMan HIV-1 test is an FDA-approved in vitro   nucleic acid amplification test for the quantitation of HIV-1 RNA in human   plasma (EDTA plasma) using the DMAON AmpliPrep instrument for automated viral   nucleic acid extraction and the DAMON TaqMan Analyzer or DAMON TaqMan for   automated Real Time PCR amplification and detection of the viral nucleic acid   target.  Titer results are reported in copies/ml. This assay is intended for use in   conjunction with clinical presentation and other laboratory markers of disease   prognosis and for use as an aid in assessing viral response to antiretroviral   treatment as measured by changes in plasma HIV-1 RNA levels. This test should   not be used as a donor screening test to confirm the presence of HIV-1   infection.         Metabolic Studies       Sodium   Date Value Ref Range Status   02/18/2018 142 133 - 144 mmol/L Final   02/18/2018 141 133 - 144 mmol/L Final     Potassium   Date Value Ref Range Status   02/18/2018 3.4 3.4 - 5.3 mmol/L Final   02/18/2018 3.4 3.4 - 5.3 mmol/L Final     Chloride   Date Value Ref Range Status   02/18/2018 109 94 - 109 mmol/L Final   02/18/2018 106 94 - 109 mmol/L Final     Carbon Dioxide   Date Value Ref Range Status   02/18/2018 25 20 - 32 mmol/L Final   02/18/2018 22 20 - 32 mmol/L Final     Anion Gap   Date Value Ref Range Status   02/18/2018 7 3 - 14 mmol/L Final   02/18/2018 12 3 - 14 mmol/L Final     Urea Nitrogen   Date Value Ref Range Status   02/18/2018 9 7 - 30 mg/dL Final   02/18/2018 13 7 - 30 mg/dL Final     Creatinine   Date Value Ref  Range Status   02/18/2018 0.82 0.66 - 1.25 mg/dL Final   02/18/2018 0.77 0.66 - 1.25 mg/dL Final     GFR Estimate   Date Value Ref Range Status   02/18/2018 >90 >60 mL/min/1.7m2 Final     Comment:     Non  GFR Calc   02/18/2018 >90 >60 mL/min/1.7m2 Final     Comment:     Non  GFR Calc     Glucose   Date Value Ref Range Status   02/18/2018 60 (L) 70 - 99 mg/dL Final   02/18/2018 219 (H) 70 - 99 mg/dL Final     Hemoglobin A1C   Date Value Ref Range Status   08/11/2017 6.0 4.3 - 6.0 % Final     Calcium   Date Value Ref Range Status   02/18/2018 8.8 8.5 - 10.1 mg/dL Final   02/18/2018 8.8 8.5 - 10.1 mg/dL Final     Phosphorus   Date Value Ref Range Status   11/07/2017 2.6 2.5 - 4.5 mg/dL Final   12/26/2016 2.9 2.5 - 4.5 mg/dL Final     Magnesium   Date Value Ref Range Status   02/15/2018 2.1 1.6 - 2.3 mg/dL Final   11/07/2017 2.0 1.6 - 2.3 mg/dL Final     Lactic Acid   Date Value Ref Range Status   01/24/2018 1.1 0.4 - 2.0 mmol/L Final   01/24/2018 1.3 0.7 - 2.1 mmol/L Final       Hepatic Studies    Bilirubin Total   Date Value Ref Range Status   02/18/2018 0.3 0.2 - 1.3 mg/dL Final   02/10/2018 0.2 0.2 - 1.3 mg/dL Final     Alkaline Phosphatase   Date Value Ref Range Status   02/18/2018 125 40 - 150 U/L Final   02/10/2018 148 40 - 150 U/L Final     Albumin   Date Value Ref Range Status   02/18/2018 3.5 3.4 - 5.0 g/dL Final   02/10/2018 2.7 (L) 3.4 - 5.0 g/dL Final     AST   Date Value Ref Range Status   02/18/2018 26 0 - 45 U/L Final   02/10/2018 16 0 - 45 U/L Final     ALT   Date Value Ref Range Status   02/18/2018 30 0 - 70 U/L Final   02/10/2018 21 0 - 70 U/L Final       Hematology Studies      WBC   Date Value Ref Range Status   02/18/2018 8.1 4.0 - 11.0 10e9/L Final   02/18/2018 7.0 4.0 - 11.0 10e9/L Final     Absolute Neutrophil   Date Value Ref Range Status   02/18/2018 3.9 1.6 - 8.3 10e9/L Final   02/18/2018 4.0 1.6 - 8.3 10e9/L Final     Absolute Lymphocytes   Date Value Ref  Range Status   02/18/2018 3.3 0.8 - 5.3 10e9/L Final   02/18/2018 2.3 0.8 - 5.3 10e9/L Final     Absolute Monocytes   Date Value Ref Range Status   02/18/2018 0.6 0.0 - 1.3 10e9/L Final   02/18/2018 0.4 0.0 - 1.3 10e9/L Final     Absolute Eosinophils   Date Value Ref Range Status   02/18/2018 0.1 0.0 - 0.7 10e9/L Final   02/18/2018 0.1 0.0 - 0.7 10e9/L Final     Hemoglobin   Date Value Ref Range Status   02/18/2018 13.6 13.3 - 17.7 g/dL Final   02/18/2018 12.8 (L) 13.3 - 17.7 g/dL Final     Hematocrit   Date Value Ref Range Status   02/18/2018 40.6 40.0 - 53.0 % Final   02/18/2018 38.7 (L) 40.0 - 53.0 % Final     Platelet Count   Date Value Ref Range Status   02/18/2018 410 150 - 450 10e9/L Final   02/18/2018 471 (H) 150 - 450 10e9/L Final       Hepatitis B Testing     Recent Labs   Lab Test  04/13/15   0816   HBCAB  Nonreactive   HEPBANG  Nonreactive   HBCM  Nonreactive   A nonreactive result suggests lack of recent exposure to the virus in the   preceding 6 months.     HBEABY  Negative  Reference range: Negative  (Note)  Performed by [facility],  [address] [facility] [address] [phone]  [email or web address], Nayan Corley MD, Lab. Director     HBEAGN  Negative  Reference range: Negative  (Note)  Performed by [facility],  [address] [facility] [address] [phone]  [email or web address], Nayan Corley MD, Lab. Director         Hepatitis C Testing     Hepatitis C Antibody   Date Value Ref Range Status   03/02/2017  NR Final    Nonreactive   Assay performance characteristics have not been established for newborns,   infants, and children     04/13/2015  NR Final    Nonreactive   Assay performance characteristics have not been established for newborns,   infants, and children       Imaging:  No results found for this or any previous visit (from the past 744 hour(s)).

## 2018-02-22 NOTE — NURSING NOTE
Chief Complaint   Patient presents with     RECHECK     abdominal abscess   Pt roomed, vitals, meds, and allergies reviewed with pt. Pt ready for provider.  Glucose 129  Silas Stahl, CMA

## 2018-02-22 NOTE — LETTER
2/22/2018       RE: Andrew Lockwood  GENERAL [facility] [address]     Dear Colleague,    Thank you for referring your patient, Andrew Lockwood, to the [facility] AND INFECTIOUS DISEASES at [facility]. Please see a copy of my visit note below.    [facility] - HIV Progress Note  Dr. Sharon Rosado, [facility], [facility], [address] [place] [address], Sixth Floor, [address]  [place] [address]  Patient:  Andrew Lockwood, Date of birth 11/27/1967, Medical record number [id number]  Date of Visit: Feb 22, 2018         Assessment and Recommendations:     HIV, History of Toxoplasmosis encephalitis with ongoing MRI abnormalities  Continue on Genvoya. He is tolerating this without issues and has been virally suppressed.  Mr. Lockwood's MRI finding are consistent with old Toxo scarring.  New lesions or progressive disease is also not likely given his CD4 count and he has until very recently been on Bactrim. Mr. Lockwood is also not experiencing the dizziness that precipitated this evaluation today.     DMII   Following with endocrine.  Changed to Trulicity. Will check microalbumin in urine at next appointment.    Symptoms of Gender Dysphoria  We have previously discussed a referral to the Program on Human Sexuality.  I will have our , Simon Cadena pursue this consultation.  Today he is not concerned about hormonal therapy, but is alarmed because he had a sexual encounter involving a woman and he has never previously been aroused by a woman.     Abdominal Abscess  His abdomen appears well healed. He can continue off antibiotics.    Preventative health care  Cardiovascular Juan Jose -               Lipids - CHOL 180, LDL 72 (8/18/2016)              Blood Pressure -  /80. Will continue to monitor.   Cancer Screening              Colon - Up to  date. [facility]   Anal - Previous anal pap with insufficient cells.  He has been referred to colorectal, but he has not yet made an appointment  Immunizations              Hepatitis A - immune, serology 4/13/15              Hepatitis B - 6/16/2016, 3/10/2016, 8/31/2017              Influenza - Up To date              Pneumovax/Prevnar - Up to date               Tdap - 6/8/14 per [facility]              Menactra - Up to date.    Renal Health -  UA without proteinuria and creatinine normal in 12/2016.  Will check microalbumin at next visit  Sexually Transmitted Infection Risk and Screening              Gonorrhea and Chlamydia - GC/chlamydia negative on 3/2017, will check today              Syphilis - negative 3/2017, will check today.     Sharon Rosado MD  Division of Infectious Diseases and International Medicine  [phone]         History of Infectious Disease Illness:   Mr. Lockwood is a 50 year old man who presents with routine follow up for HIV and abdominal abscess.  He primarily follows up today because of a recent hospitalization for dizziness.  He had an MRI of the brain done which showed a non-enhancing lesion consistent with his previous toxoplasmosis.  We stopped his Bactrim a couple months ago because his CD4 count had been over 100 since 2016 and over 200 since August 2017.  He has been virally suppressed since 2016.  Today he is not having issues with the dizziness.  He is primarily concerned today because he is concerned about his sexual orientation.  Andrew has always been attracted to me, but he had a sexual encounter with a couple - one man and one woman, and he was aroused by the woman.  He is worried about what this means and would like to talk to someone in our human sexuality clinic.  In terms of his abdomen, he is doing well.  He recently had appendicitis complicated by abscess.  His drain is now out and he is off antibiotics and doing well.  No abdominal pain.  No diarrhea.  No fevers.  No  rashes.         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 They did not live together.  Historical use of ARVs: Triumeq, Genvoya  Historical Resistance Mutations: Susceptible        Past Medical and Surgical History:     Past Medical History:   Diagnosis Date     AIDS (H)      Allergic rhinitis due to other allergen     DNS     Chronic abdominal pain      CNS toxoplasmosis (H)      Diabetes type 2, controlled (H)      GERD (gastroesophageal reflux disease)      HIV (human immunodeficiency virus infection) (H)      Periungual wart      Sleep apnea     doesn't use CPAP       Past Surgical History:   Procedure Laterality Date     C NONSPECIFIC PROCEDURE      right forearm fracture     COLONOSCOPY Left 1/22/2016    Procedure: COMBINED COLONOSCOPY, SINGLE OR MULTIPLE BIOPSY/POLYPECTOMY BY BIOPSY;  Surgeon: Clark Saini MD;  Location: [facility] GI     HC EXPLORE UNDESC TESTIS,INGUIN/SCROTAL       LAPAROSCOPIC APPENDECTOMY N/A 1/31/2018    Procedure: LAPAROSCOPIC APPENDECTOMY;  LAPAROSCOPIC APPENDECTOMY;  Surgeon: Dawn Holt MD;  Location: [facility] OR     LAPAROSCOPY DIAGNOSTIC (GENERAL) N/A 7/26/2016    Procedure: LAPAROSCOPY DIAGNOSTIC (GENERAL);  Surgeon: Susannah Arriaga MD;  Location: [facility] OR     OPTICAL TRACKING SYSTEM CRANIOTOMY, EXCISE TUMOR, COMBINED Left 4/10/2015    Procedure: COMBINED OPTICAL TRACKING SYSTEM CRANIOTOMY, EXCISE TUMOR;  Surgeon: Mirlande Colmenares MD;  Location: [facility] OR     REPAIR GAMEKEEPER'S THUMB Right 12/2/2016    Procedure: REPAIR LIGAMENT ULNAR COLLATERAL THUMB (GAMEKEEPER'S);  Surgeon: Evin Zamorano  MD Samuel;  Location:  OR           Family History:     Family History   Problem Relation Age of Onset     DIABETES Brother      DIABETES Father      Alzheimer Disease Father      Unknown/Adopted Mother      DIABETES Paternal Grandfather      CANCER No family hx of      no skin cancer     Skin Cancer No family hx of      no famiy hx of skin cancer           Social History:     Social History   Substance Use Topics     Smoking status: Current Every Day Smoker     Packs/day: 0.20     Last attempt to quit: 10/28/2016     Smokeless tobacco: Former User      Comment: 4 cigarettes     Alcohol use No      Comment: Last etoh in 2007     Social History     Social History Narrative    Born in [facility] [place].  Came to the [place] in 1993.  Last traveled to visit family in 2008.                 Review of Systems:   CONSTITUTIONAL: negative fevers or chills   EYES: negative for icterus  ENT: negative for hearing loss, tinnitus, sore throat   RESPIRATORY: negative for cough, sputum or dyspnea   CARDIOVASCULAR: negative for chest pain, palpitations   GASTROINTESTINAL: negative for nausea, vomiting, diarrhea or constipation   GENITOURINARY: negative for dysuria or hematuria - no genital lesions  HEME: No easy bruising   INTEGUMENT: negative for rash or pruritus  NEURO: Negative for headache         Current Medications:     Current Outpatient Prescriptions   Medication Sig Dispense Refill     dulaglutide (TRULICITY) 1.5 MG/0.5ML pen Inject 1.5 mg Subcutaneous every 7 days       simethicone (GAS-X EXTRA STRENGTH) 125 MG CHEW chewable tablet Take 1 tablet (125 mg) by mouth 2 times daily 120 tablet 0     gabapentin (NEURONTIN) 300 MG capsule Take 1 capsule (300 mg) by mouth 3 times daily for 3 days 9 capsule 0     insulin pen needle (B-D U/F) 31G X 8 MM Use 1 pen needles daily or as directed. 100 each 0     acetaminophen (TYLENOL) 325 MG tablet Take 2 tablets (650 mg) by mouth every 4 hours 100 tablet 1     blood glucose monitoring (NO  BRAND SPECIFIED) meter device kit Use to test blood sugar 4 times daily or as directed. 1 kit 0     blood glucose monitoring (NO BRAND SPECIFIED) test strip 1 strip by In Vitro route 4 times daily (before meals and nightly) Use to test blood sugars 4 times daily or as directed 100 strip 11     blood glucose monitoring (NO BRAND SPECIFIED) test strip Use to test blood sugars four times daily or as directed 100 each 5     alum & mag hydroxide-simethicone (MAALOX ADVANCED MAX ST) 400-400-40 MG/5ML SUSP suspension Take 30 mLs by mouth every 4 hours as needed for indigestion (Patient not taking: Reported on 2/22/2018) 355 mL 0     blood glucose monitoring (NO BRAND SPECIFIED) meter device kit Use to test blood sugar four times daily or as directed. (Patient not taking: Reported on 2/22/2018) 1 kit 0     blood glucose (NO BRAND SPECIFIED) lancets standard Use to test blood sugar four times daily or as directed. (Patient not taking: Reported on 2/22/2018) 100 Box 5            Immunization History:     Immunization History   Administered Date(s) Administered     HepB 03/10/2016, 06/16/2016     HepB-Adult 08/31/2017     Influenza (IIV3) PF 10/17/2016     Influenza Vaccine IM 3yrs+ 4 Valent IIV4 12/22/2015, 12/14/2017     Mantoux Tuberculin Skin Test 04/12/2015     Meningococcal (Menactra ) 08/31/2015, 03/10/2016     Pneumo Conj 13-V (2010&after) 03/10/2016     Pneumococcal 23 valent 06/16/2016     TDAP Vaccine (Boostrix) 10/17/2016            Allergies:     Allergies   Allergen Reactions     Metformin      Abdominal pain     Milk [Lac Bovis] Hives     Tylenol [Acetaminophen] Itching            Physical Exam:   /80  Pulse 111  Temp 98.2  F (36.8  C) (Oral)  Wt 76.6 kg (168 lb 12.8 oz)  SpO2 97%  BMI 28.09 kg/m2  Physical Examination:  GENERAL:  well-developed, well-nourished, seated in no acute distress.  HEENT:  Head is normocephalic, atraumatic   EYES:  Eyes have anicteric sclerae without conjunctival injection    NECK: Supple no LAD  CV: RRR no M/R/G  RESP: BCTA No W/R/R  ABD: Soft NTND,  Well healing wound at drain site  SKIN:  No acute rashes. .   NEUROLOGIC:  Grossly nonfocal. Active x4 extremities          Laboratory Data:     CD4 Count  Absolute CD4   Date Value Ref Range Status   12/12/2017 225 (L) 441 - 2156 cells/uL Final     CD4 [place] T   Date Value Ref Range Status   12/12/2017 10 (L) 28 - 63 % Final     CD4:CD8 Ratio   Date Value Ref Range Status   12/12/2017 0.29 (L) 1.40 - 2.60 Final       HIV-1 RNA Quantitative:  HIV-1 RNA Quant Result   Date Value Ref Range Status   12/12/2017 52 (A) HIVND^HIV-1 RNA Not Detected [Copies]/mL Final     Comment:     The DAMON AmpliPrep/DAMON TaqMan HIV-1 test is an FDA-approved in vitro   nucleic acid amplification test for the quantitation of HIV-1 RNA in human   plasma (EDTA plasma) using the DAMON AmpliPrep instrument for automated viral   nucleic acid extraction and the DAMON TaqMan Analyzer or [employer] TaqMan for   automated Real Time PCR amplification and detection of the viral nucleic acid   target.  Titer results are reported in copies/ml. This assay is intended for use in   conjunction with clinical presentation and other laboratory markers of disease   prognosis and for use as an aid in assessing viral response to antiretroviral   treatment as measured by changes in plasma HIV-1 RNA levels. This test should   not be used as a donor screening test to confirm the presence of HIV-1   infection.         Metabolic Studies       Sodium   Date Value Ref Range Status   02/18/2018 142 133 - 144 mmol/L Final   02/18/2018 141 133 - 144 mmol/L Final     Potassium   Date Value Ref Range Status   02/18/2018 3.4 3.4 - 5.3 mmol/L Final   02/18/2018 3.4 3.4 - 5.3 mmol/L Final     Chloride   Date Value Ref Range Status   02/18/2018 109 94 - 109 mmol/L Final   02/18/2018 106 94 - 109 mmol/L Final     Carbon Dioxide   Date Value Ref Range Status   02/18/2018 25 20 - 32 mmol/L Final    02/18/2018 22 20 - 32 mmol/L Final     Anion Gap   Date Value Ref Range Status   02/18/2018 7 3 - 14 mmol/L Final   02/18/2018 12 3 - 14 mmol/L Final     Urea Nitrogen   Date Value Ref Range Status   02/18/2018 9 7 - 30 mg/dL Final   02/18/2018 13 7 - 30 mg/dL Final     Creatinine   Date Value Ref Range Status   02/18/2018 0.82 0.66 - 1.25 mg/dL Final   02/18/2018 0.77 0.66 - 1.25 mg/dL Final     GFR Estimate   Date Value Ref Range Status   02/18/2018 >90 >60 mL/min/1.7m2 Final     Comment:     Non  GFR Calc   02/18/2018 >90 >60 mL/min/1.7m2 Final     Comment:     Non  GFR Calc     Glucose   Date Value Ref Range Status   02/18/2018 60 (L) 70 - 99 mg/dL Final   02/18/2018 219 (H) 70 - 99 mg/dL Final     Hemoglobin A1C   Date Value Ref Range Status   08/11/2017 6.0 4.3 - 6.0 % Final     Calcium   Date Value Ref Range Status   02/18/2018 8.8 8.5 - 10.1 mg/dL Final   02/18/2018 8.8 8.5 - 10.1 mg/dL Final     Phosphorus   Date Value Ref Range Status   11/07/2017 2.6 2.5 - 4.5 mg/dL Final   12/26/2016 2.9 2.5 - 4.5 mg/dL Final     Magnesium   Date Value Ref Range Status   02/15/2018 2.1 1.6 - 2.3 mg/dL Final   11/07/2017 2.0 1.6 - 2.3 mg/dL Final     Lactic Acid   Date Value Ref Range Status   01/24/2018 1.1 0.4 - 2.0 mmol/L Final   01/24/2018 1.3 0.7 - 2.1 mmol/L Final       Hepatic Studies    Bilirubin Total   Date Value Ref Range Status   02/18/2018 0.3 0.2 - 1.3 mg/dL Final   02/10/2018 0.2 0.2 - 1.3 mg/dL Final     Alkaline Phosphatase   Date Value Ref Range Status   02/18/2018 125 40 - 150 U/L Final   02/10/2018 148 40 - 150 U/L Final     Albumin   Date Value Ref Range Status   02/18/2018 3.5 3.4 - 5.0 g/dL Final   02/10/2018 2.7 (L) 3.4 - 5.0 g/dL Final     AST   Date Value Ref Range Status   02/18/2018 26 0 - 45 U/L Final   02/10/2018 16 0 - 45 U/L Final     ALT   Date Value Ref Range Status   02/18/2018 30 0 - 70 U/L Final   02/10/2018 21 0 - 70 U/L Final       Hematology Studies       WBC   Date Value Ref Range Status   02/18/2018 8.1 4.0 - 11.0 10e9/L Final   02/18/2018 7.0 4.0 - 11.0 10e9/L Final     Absolute Neutrophil   Date Value Ref Range Status   02/18/2018 3.9 1.6 - 8.3 10e9/L Final   02/18/2018 4.0 1.6 - 8.3 10e9/L Final     Absolute Lymphocytes   Date Value Ref Range Status   02/18/2018 3.3 0.8 - 5.3 10e9/L Final   02/18/2018 2.3 0.8 - 5.3 10e9/L Final     Absolute Monocytes   Date Value Ref Range Status   02/18/2018 0.6 0.0 - 1.3 10e9/L Final   02/18/2018 0.4 0.0 - 1.3 10e9/L Final     Absolute Eosinophils   Date Value Ref Range Status   02/18/2018 0.1 0.0 - 0.7 10e9/L Final   02/18/2018 0.1 0.0 - 0.7 10e9/L Final     Hemoglobin   Date Value Ref Range Status   02/18/2018 13.6 13.3 - 17.7 g/dL Final   02/18/2018 12.8 (L) 13.3 - 17.7 g/dL Final     Hematocrit   Date Value Ref Range Status   02/18/2018 40.6 40.0 - 53.0 % Final   02/18/2018 38.7 (L) 40.0 - 53.0 % Final     Platelet Count   Date Value Ref Range Status   02/18/2018 410 150 - 450 10e9/L Final   02/18/2018 471 (H) 150 - 450 10e9/L Final       Hepatitis B Testing     Recent Labs   Lab Test  04/13/15   0816   HBCAB  Nonreactive   HEPBANG  Nonreactive   HBCM  Nonreactive   A nonreactive result suggests lack of recent exposure to the virus in the   preceding 6 months.     HBEABY  Negative  Reference range: Negative  (Note)  Performed by [facility],  [address] [facility],[place] [id number] [phone]  [email or web address], Nayan Corley MD, Lab. Director     HBEAGN  Negative  Reference range: Negative  (Note)  Performed by [facility],  [address] [facility] [address] [phone]  [email or web address], Nayan Corley MD, Lab. Director         Hepatitis C Testing     Hepatitis C Antibody   Date Value Ref Range Status   03/02/2017  NR Final    Nonreactive   Assay performance characteristics have not been established for newborns,   infants, and children     04/13/2015  NR Final    Nonreactive   Assay performance characteristics  have not been established for newborns,   infants, and children       Imaging:  No results found for this or any previous visit (from the past 744 hour(s)).      Again, thank you for allowing me to participate in the care of your patient.      Sincerely,    Sharon Rosado MD

## 2018-02-23 ENCOUNTER — TELEPHONE (OUTPATIENT)
Dept: INFECTIOUS DISEASES | Facility: CLINIC | Age: 53
End: 2018-02-23

## 2018-02-23 LAB
C TRACH DNA SPEC QL NAA+PROBE: NEGATIVE
C TRACH DNA SPEC QL NAA+PROBE: NEGATIVE
N GONORRHOEA DNA SPEC QL NAA+PROBE: NEGATIVE
N GONORRHOEA DNA SPEC QL NAA+PROBE: NEGATIVE
SPECIMEN SOURCE: NORMAL

## 2018-02-23 NOTE — TELEPHONE ENCOUNTER
This is a summary note related to pts ongoing stressors related to homelessness, poverty, immigration status/complications and his limited ability to manage  his complex chronic care needs. After several failed intake appts, he was able to complete phone intake for [facility] case mgmt yesterday. The intent is to encourage his use of the elaborate resources for housing, , advocacy and resource coordination through [facility]. He is continuing to stay with a friend, even though this is not ideal, it seems adequate for the moment. He also voices various intentions about moving out of state and is currently discussing gender dysmorphic tendencies. He has limited ability to focus and manage his day to day affairs. He does know very well how to access emergent community resources for food, transpotation. Even though he has all these challenges, he manages to get his basic needs met . He has a very sophisticated  addressing permanetcy and his is hoping to get a  at  [facility] for minor unresolved citations which are affecting his eligibility for [facility] financial aid. He frequently walks in to the [facility] to ask for advice from his various clinical relationships. The [facility]  Clinic program knows him historically the best and will continue to assist.   Simon Cadena, [facility]    [phone]

## 2018-03-05 LAB
FUNGUS SPEC CULT: NORMAL
SPECIMEN SOURCE: NORMAL

## 2018-03-06 ENCOUNTER — APPOINTMENT (OUTPATIENT)
Dept: CT IMAGING | Facility: CLINIC | Age: 53
End: 2018-03-06
Attending: EMERGENCY MEDICINE
Payer: COMMERCIAL

## 2018-03-06 ENCOUNTER — HOSPITAL ENCOUNTER (INPATIENT)
Facility: CLINIC | Age: 53
LOS: 1 days | Discharge: HOME | End: 2018-03-07
Attending: EMERGENCY MEDICINE | Admitting: INTERNAL MEDICINE
Payer: COMMERCIAL

## 2018-03-06 DIAGNOSIS — B20 AIDS (ACQUIRED IMMUNE DEFICIENCY SYNDROME) (H): ICD-10-CM

## 2018-03-06 DIAGNOSIS — Z21 HUMAN IMMUNODEFICIENCY VIRUS I INFECTION (H): ICD-10-CM

## 2018-03-06 DIAGNOSIS — H65.92 OME (OTITIS MEDIA WITH EFFUSION), LEFT: ICD-10-CM

## 2018-03-06 DIAGNOSIS — H92.02 LEFT EAR PAIN: ICD-10-CM

## 2018-03-06 DIAGNOSIS — Z00.00 PREVENTATIVE HEALTH CARE: ICD-10-CM

## 2018-03-06 LAB
ANION GAP SERPL CALCULATED.3IONS-SCNC: 7 MMOL/L (ref 3–14)
BASOPHILS # BLD AUTO: 0 10E9/L (ref 0–0.2)
BASOPHILS NFR BLD AUTO: 0.4 %
BUN SERPL-MCNC: 17 MG/DL (ref 7–30)
CALCIUM SERPL-MCNC: 9 MG/DL (ref 8.5–10.1)
CHLORIDE SERPL-SCNC: 105 MMOL/L (ref 94–109)
CO2 SERPL-SCNC: 28 MMOL/L (ref 20–32)
CREAT SERPL-MCNC: 0.81 MG/DL (ref 0.66–1.25)
CRP SERPL-MCNC: 53 MG/L (ref 0–8)
DIFFERENTIAL METHOD BLD: NORMAL
EOSINOPHIL # BLD AUTO: 0.1 10E9/L (ref 0–0.7)
EOSINOPHIL NFR BLD AUTO: 1.8 %
ERYTHROCYTE [DISTWIDTH] IN BLOOD BY AUTOMATED COUNT: 13.7 % (ref 10–15)
GFR SERPL CREATININE-BSD FRML MDRD: >90 ML/MIN/1.7M2
GLUCOSE SERPL-MCNC: 114 MG/DL (ref 70–99)
HCT VFR BLD AUTO: 42.2 % (ref 40–53)
HGB BLD-MCNC: 13.8 G/DL (ref 13.3–17.7)
IMM GRANULOCYTES # BLD: 0 10E9/L (ref 0–0.4)
IMM GRANULOCYTES NFR BLD: 0.4 %
LIPASE SERPL-CCNC: 279 U/L (ref 73–393)
LYMPHOCYTES # BLD AUTO: 2.2 10E9/L (ref 0.8–5.3)
LYMPHOCYTES NFR BLD AUTO: 38.1 %
MCH RBC QN AUTO: 31.4 PG (ref 26.5–33)
MCHC RBC AUTO-ENTMCNC: 32.7 G/DL (ref 31.5–36.5)
MCV RBC AUTO: 96 FL (ref 78–100)
MONOCYTES # BLD AUTO: 0.7 10E9/L (ref 0–1.3)
MONOCYTES NFR BLD AUTO: 12.7 %
NEUTROPHILS # BLD AUTO: 2.7 10E9/L (ref 1.6–8.3)
NEUTROPHILS NFR BLD AUTO: 46.6 %
NRBC # BLD AUTO: 0 10*3/UL
NRBC BLD AUTO-RTO: 0 /100
NT-PROBNP SERPL-MCNC: 31 PG/ML (ref 0–900)
PLATELET # BLD AUTO: 212 10E9/L (ref 150–450)
POTASSIUM SERPL-SCNC: 3.3 MMOL/L (ref 3.4–5.3)
RBC # BLD AUTO: 4.4 10E12/L (ref 4.4–5.9)
SODIUM SERPL-SCNC: 140 MMOL/L (ref 133–144)
WBC # BLD AUTO: 5.7 10E9/L (ref 4–11)

## 2018-03-06 PROCEDURE — 80048 BASIC METABOLIC PNL TOTAL CA: CPT | Performed by: EMERGENCY MEDICINE

## 2018-03-06 PROCEDURE — 96361 HYDRATE IV INFUSION ADD-ON: CPT | Performed by: EMERGENCY MEDICINE

## 2018-03-06 PROCEDURE — 85025 COMPLETE CBC W/AUTO DIFF WBC: CPT | Performed by: EMERGENCY MEDICINE

## 2018-03-06 PROCEDURE — 99285 EMERGENCY DEPT VISIT HI MDM: CPT | Mod: 25 | Performed by: EMERGENCY MEDICINE

## 2018-03-06 PROCEDURE — 25000125 ZZHC RX 250: Performed by: EMERGENCY MEDICINE

## 2018-03-06 PROCEDURE — 83690 ASSAY OF LIPASE: CPT | Performed by: EMERGENCY MEDICINE

## 2018-03-06 PROCEDURE — 99284 EMERGENCY DEPT VISIT MOD MDM: CPT | Mod: Z6 | Performed by: EMERGENCY MEDICINE

## 2018-03-06 PROCEDURE — 86140 C-REACTIVE PROTEIN: CPT | Performed by: EMERGENCY MEDICINE

## 2018-03-06 PROCEDURE — 25000128 H RX IP 250 OP 636: Performed by: EMERGENCY MEDICINE

## 2018-03-06 PROCEDURE — 96365 THER/PROPH/DIAG IV INF INIT: CPT | Performed by: EMERGENCY MEDICINE

## 2018-03-06 PROCEDURE — 70481 CT ORBIT/EAR/FOSSA W/DYE: CPT

## 2018-03-06 PROCEDURE — 25000128 H RX IP 250 OP 636

## 2018-03-06 PROCEDURE — 25000132 ZZH RX MED GY IP 250 OP 250 PS 637: Performed by: EMERGENCY MEDICINE

## 2018-03-06 PROCEDURE — 83880 ASSAY OF NATRIURETIC PEPTIDE: CPT | Performed by: EMERGENCY MEDICINE

## 2018-03-06 RX ORDER — IBUPROFEN 600 MG/1
600 TABLET, FILM COATED ORAL ONCE
Status: COMPLETED | OUTPATIENT
Start: 2018-03-06 | End: 2018-03-06

## 2018-03-06 RX ORDER — AMPICILLIN AND SULBACTAM 2; 1 G/1; G/1
3 INJECTION, POWDER, FOR SOLUTION INTRAMUSCULAR; INTRAVENOUS ONCE
Status: COMPLETED | OUTPATIENT
Start: 2018-03-06 | End: 2018-03-07

## 2018-03-06 RX ORDER — IOPAMIDOL 755 MG/ML
75 INJECTION, SOLUTION INTRAVASCULAR ONCE
Status: COMPLETED | OUTPATIENT
Start: 2018-03-06 | End: 2018-03-06

## 2018-03-06 RX ORDER — OXYCODONE HYDROCHLORIDE 10 MG/1
10 TABLET ORAL EVERY 6 HOURS PRN
Status: DISCONTINUED | OUTPATIENT
Start: 2018-03-06 | End: 2018-03-07

## 2018-03-06 RX ORDER — SODIUM CHLORIDE 9 MG/ML
1000 INJECTION, SOLUTION INTRAVENOUS CONTINUOUS
Status: DISCONTINUED | OUTPATIENT
Start: 2018-03-06 | End: 2018-03-07

## 2018-03-06 RX ORDER — OXYCODONE HYDROCHLORIDE 100 MG/5ML
10 SOLUTION ORAL EVERY 6 HOURS PRN
Status: DISCONTINUED | OUTPATIENT
Start: 2018-03-06 | End: 2018-03-06

## 2018-03-06 RX ORDER — ONDANSETRON 4 MG/1
4 TABLET, ORALLY DISINTEGRATING ORAL ONCE
Status: COMPLETED | OUTPATIENT
Start: 2018-03-06 | End: 2018-03-06

## 2018-03-06 RX ADMIN — OXYCODONE HYDROCHLORIDE 10 MG: 5 TABLET ORAL at 23:34

## 2018-03-06 RX ADMIN — ONDANSETRON 4 MG: 4 TABLET, ORALLY DISINTEGRATING ORAL at 21:16

## 2018-03-06 RX ADMIN — SODIUM CHLORIDE 1000 ML: 900 INJECTION, SOLUTION INTRAVENOUS at 21:16

## 2018-03-06 RX ADMIN — IOPAMIDOL 75 ML: 755 INJECTION, SOLUTION INTRAVENOUS at 22:21

## 2018-03-06 RX ADMIN — IBUPROFEN 600 MG: 600 TABLET ORAL at 22:34

## 2018-03-06 RX ADMIN — AMPICILLIN SODIUM AND SULBACTAM SODIUM 3 G: 2; 1 INJECTION, POWDER, FOR SOLUTION INTRAMUSCULAR; INTRAVENOUS at 23:35

## 2018-03-06 ASSESSMENT — ENCOUNTER SYMPTOMS
COLOR CHANGE: 0
NECK STIFFNESS: 0
ABDOMINAL PAIN: 0
DIARRHEA: 0
CONFUSION: 0
NAUSEA: 1
SHORTNESS OF BREATH: 0
FEVER: 0
EYE REDNESS: 0
ARTHRALGIAS: 0
DIFFICULTY URINATING: 0
HEADACHES: 1
VOMITING: 0

## 2018-03-06 NOTE — LETTER
Transition Communication Hand-off for Care Transitions to Next Level of Care Provider    Name: Andrew Lockwood  MRN #: [id number]  Primary Care Provider: Rubio Cronin     Primary Clinic: [address]  [facility] [address]     Reason for Hospitalization:  AIDS (acquired immune deficiency syndrome) (H) [B20]  OME (otitis media with effusion), left [H65.92]  Admit Date/Time: 3/6/2018  8:02 PM  Discharge Date: 3/7/18  Payor Source: Payor: BCBS / Plan: BLUE CROSS QHP / Product Type: Indemnity /     Readmission Assessment Measure (SHIKHA) Risk Score/category: Very High    Reason for Communication Hand-off Referral: No support or lacking a support system    Discharge Plan: back to previous living situation; chronically homeless       Concern for non-adherence with plan of care:   Y/N yes  Already enrolled in Tele-monitoring program and name of program:  no  Follow-up specialty is recommended: Yes    Follow-up plan:  Future Appointments  Date Time Provider Department Center   3/9/2018 10:30 AM Evin Zamorano MD [facility]   3/14/2018 8:40 AM Russ Ponce MD [facility]   4/6/2018 8:30 AM Jose Ramon Singh MD [facility]     Key Recommendations:  See AVS    Maranda Henderson RN, BSN  Care Coordinator [facility]  Pager: [phone]  Phone: [phone]    AVS/Discharge Summary is the source of truth; this is a helpful guide for improved communication of patient story

## 2018-03-06 NOTE — IP AVS SNAPSHOT
MRN:[id number]                      After Visit Summary   3/6/2018    Andrew Lockwood    MRN: [id number]           Thank you!     Thank you for choosing [facility] for your care. Our goal is always to provide you with excellent care. Hearing back from our patients is one way we can continue to improve our services. Please take a few minutes to complete the written survey that you may receive in the mail after you visit with us. Thank you!        Patient Information     Date Of Birth          11/27/1967        About your hospital stay     You were admitted on:  March 7, 2018 You last received care in the:  [address] [facility]    You were discharged on:  March 7, 2018        Reason for your hospital stay       otitis media                  Who to Call     For medical emergencies, please call 911.  For non-urgent questions about your medical care, please call your primary care provider or clinic, [phone]          Attending Provider     Provider Specialty    Parish Gleason MD Emergency Medicine    Damion, Sheila Theodore MD Emergency Medicine    Janie, Shaw Cruz MD Internal Medicine       Primary Care Provider Office Phone # Fax #    Rubio Cronin [phone]      After Care Instructions     Activity       Your activity upon discharge: activity as tolerated            Diet       Follow this diet upon discharge: Regular                  Follow-up Appointments     Adult [facility] Follow-up and recommended labs and tests       Follow up with primary care provider, Rubio Cronin, within 7 days for hospital follow- up.  No follow up labs or test are needed.    Follow up with ENT in 2 weeks  Appointments on [place] and/or [facility] (with [facility] or [facility] provider or service). Call [phone] if you haven't heard regarding these appointments within 7 days of discharge.                  Your next 10 appointments already scheduled     Mar 09, 2018 10:30 AM CST   (Arrive by  "10:15 AM)   RETURN HAND with Evin Zamorano MD   [facility] ([facility])    [address]  4th Floor  [facility] [address] [phone]            Mar 14, 2018  8:40 AM CDT   (Arrive by 8:25 AM)   RETURN DIABETES with Russ Ponce MD   [facility] ([facility])    [address]  3rd Floor  [facility] [id number] [phone]            Apr 06, 2018  8:30 AM CDT   (Arrive by 8:15 AM)   Return Visit with Jose Ramon Singh MD   [facility] ([facility])    [address]  3rd Floor  [facility] [id number] [phone]              Pending Results     Date and Time Order Name Status Description    3/7/2018 0249 HIV-1 RNA quantitative In process             Statement of Approval     Ordered          03/07/18 1309  I have reviewed and agree with all the recommendations and orders detailed in this document.  EFFECTIVE NOW     Approved and electronically signed by:  Alec Kimble MD             Admission Information     Date & Time Provider Department Dept. Phone    3/6/2018 Shaw Lima MD [address] [facility] [phone]      Your Vitals Were     Blood Pressure Pulse Temperature Respirations Height Weight    121/70 (BP Location: Right arm) 83 99.2  F (37.3  C) (Oral) 18 1.651 m (5' 5\") 76.2 kg (167 lb 15.9 oz)    Pulse Oximetry BMI (Body Mass Index)                97% 27.96 kg/m2          [employer] Information     [employer] gives you secure access to your electronic health record. If you see a primary care provider, you can also send messages to your care team and make appointments. If you have questions, please call your primary care clinic.  If you do not have a primary care provider, please call [phone] and they will assist you.        Care EveryWhere ID     This is your Care EveryWhere ID. This could be used by other organizations to access your " [facility] medical records  [id number]        Equal Access to Services     JAZMYNANGELINA JULIAN : Hadii aad ku hadsebastianjuan luis Rebekaterence, warenettada luqadaha, qaybta kajefferyadeline crowgeronimoadeline, dez malcolmclaudetteriky salazar. So [facility] [phone].    ATENCIÓN: Si habla español, tiene a kohli disposición servicios gratuitos de asistencia lingüística. Llame al [phone].    We comply with applicable federal civil rights laws and [place] laws. We do not discriminate on the basis of race, color, national origin, age, disability, sex, sexual orientation, or gender identity.               Review of your medicines      START taking        Dose / Directions    amoxicillin-clavulanate 875-125 MG per tablet   Commonly known as:  AUGMENTIN   Indication:  otitis media   Used for:  OME (otitis media with effusion), left        Dose:  1 tablet   Take 1 tablet by mouth 2 times daily   Quantity:  28 tablet   Refills:  0       Wukkzxe-Akmqv-Tmtczgjm-TenofAF 958-571-330-10 MG Tabs per tablet   Commonly known as:  GENVOYA   Indication:  Positive HIV Test   Used for:  AIDS (acquired immune deficiency syndrome) (H)        Dose:  1 tablet   Start taking on:  3/8/2018   Take 1 tablet by mouth daily (with breakfast)   Quantity:  30 tablet   Refills:  0       ibuprofen 600 MG tablet   Commonly known as:  ADVIL/MOTRIN   Used for:  OME (otitis media with effusion), left        Dose:  600 mg   Take 1 tablet (600 mg) by mouth every 8 hours as needed for moderate pain   Quantity:  60 tablet   Refills:  0         CONTINUE these medicines which have NOT CHANGED        Dose / Directions    acetaminophen 325 MG tablet   Commonly known as:  TYLENOL   Used for:  Abdominal abscess (H)        Dose:  650 mg   Take 2 tablets (650 mg) by mouth every 4 hours   Quantity:  100 tablet   Refills:  1       alum & mag hydroxide-simethicone 400-400-40 MG/5ML Susp suspension   Commonly known as:  MAALOX ADVANCED MAX ST        Dose:  30 mL   Take 30 mLs by mouth every 4 hours as  needed for indigestion   Quantity:  355 mL   Refills:  0       blood glucose lancets standard   Commonly known as:  no brand specified   Used for:  Type 2 diabetes mellitus without complication, without long-term current use of insulin (H)        Use to test blood sugar four times daily or as directed.   Quantity:  100 Box   Refills:  5       * blood glucose monitoring meter device kit   Commonly known as:  no brand specified   Used for:  Type 2 diabetes mellitus without complication, without long-term current use of insulin (H)        Use to test blood sugar four times daily or as directed.   Quantity:  1 kit   Refills:  0       * blood glucose monitoring meter device kit   Commonly known as:  no brand specified   Used for:  Type 2 diabetes mellitus with complication, without long-term current use of insulin (H)        Use to test blood sugar 4 times daily or as directed.   Quantity:  1 kit   Refills:  0       * blood glucose monitoring test strip   Commonly known as:  no brand specified   Used for:  Type 2 diabetes mellitus without complication, without long-term current use of insulin (H)        Use to test blood sugars four times daily or as directed   Quantity:  100 each   Refills:  5       * blood glucose monitoring test strip   Commonly known as:  no brand specified   Used for:  Type 2 diabetes mellitus with complication, without long-term current use of insulin (H)        Dose:  1 strip   1 strip by In Vitro route 4 times daily (before meals and nightly) Use to test blood sugars 4 times daily or as directed   Quantity:  100 strip   Refills:  11       insulin pen needle 31G X 8 MM   Commonly known as:  B-D U/F   Used for:  Type 2 diabetes mellitus without complication, without long-term current use of insulin (H)        Use 1 pen needles daily or as directed.   Quantity:  100 each   Refills:  0       simethicone 125 MG Chew chewable tablet   Commonly known as:  GAS-X EXTRA STRENGTH   Used for:  Gas pain         Dose:  125 mg   Take 1 tablet (125 mg) by mouth 2 times daily   Quantity:  120 tablet   Refills:  0       TRULICITY 1.5 MG/0.5ML pen   Used for:  Human immunodeficiency virus I infection (H), Preventative health care   Generic drug:  dulaglutide        Dose:  1.5 mg   Inject 1.5 mg Subcutaneous every 7 days   Refills:  0       * Notice:  This list has 4 medication(s) that are the same as other medications prescribed for you. Read the directions carefully, and ask your doctor or other care provider to review them with you.      STOP taking     gabapentin 300 MG capsule   Commonly known as:  NEURONTIN                Where to get your medicines      These medications were sent to [facility] [place], [place] - [address]  [address] [address]     Phone:  [phone]     amoxicillin-clavulanate 875-125 MG per tablet    Qcrkgpj-Jowvi-Bvdcbgyf-TenofAF [id number] MG Tabs per tablet    ibuprofen 600 MG tablet                Protect others around you: Learn how to safely use, store and throw away your medicines at www.disposemymeds.org.        ANTIBIOTIC INSTRUCTION     You've Been Prescribed an Antibiotic - Now What?  Your healthcare team thinks that you or your loved one might have an infection. Some infections can be treated with antibiotics, which are powerful, life-saving drugs. Like all medications, antibiotics have side effects and should only be used when necessary. There are some important things you should know about your antibiotic treatment.      Your healthcare team may run tests before you start taking an antibiotic.    Your team may take samples (e.g., from your blood, urine or other areas) to run tests to look for bacteria. These test can be important to determine if you need an antibiotic at all and, if you do, which antibiotic will work best.      Within a few days, your healthcare team might change or even stop your antibiotic.    Your team may start  you on an antibiotic while they are working to find out what is making you sick.    Your team might change your antibiotic because test results show that a different antibiotic would be better to treat your infection.    In some cases, once your team has more information, they learn that you do not need an antibiotic at all. They may find out that you don't have an infection, or that the antibiotic you're taking won't work against your infection. For example, an infection caused by a virus can't be treated with antibiotics. Staying on an antibiotic when you don't need it is more likely to be harmful than helpful.      You may experience side effects from your antibiotic.    Like all medications, antibiotics have side effects. Some of these can be serious.    Let you healthcare team know if you have any known allergies when you are admitted to the hospital.    One significant side effect of nearly all antibiotics is the risk of severe and sometimes deadly diarrhea caused by Clostridium difficile (C. Difficile). This occurs when a person takes antibiotics because some good germs are destroyed. Antibiotic use allows C. diificile to take over, putting patients at high risk for this serious infection.    As a patient or caregiver, it is important to understand your or your loved one's antibiotic treatment. It is especially important for caregivers to speak up when patients can't speak for themselves. Here are some important questions to ask your healthcare team.    What infection is this antibiotic treating and how do you know I have that infection?    What side effects might occur from this antibiotic?    How long will I need to take this antibiotic?    Is it safe to take this antibiotic with other medications or supplements (e.g., vitamins) that I am taking?     Are there any special directions I need to know about taking this antibiotic? For example, should I take it with food?    How will I be monitored to know  whether my infection is responding to the antibiotic?    What tests may help to make sure the right antibiotic is prescribed for me?      Information provided by:  www.cdc.gov/getsmart  U.S. Department of Health and Human Services  Centers for disease Control and Prevention  National Center for Emerging and Zoonotic Infectious Diseases  Division of Healthcare Quality Promotion             Medication List: This is a list of all your medications and when to take them. Check marks below indicate your daily home schedule. Keep this list as a reference.      Medications           Morning Afternoon Evening Bedtime As Needed    acetaminophen 325 MG tablet   Commonly known as:  TYLENOL   Take 2 tablets (650 mg) by mouth every 4 hours                                alum & mag hydroxide-simethicone 400-400-40 MG/5ML Susp suspension   Commonly known as:  MAALOX ADVANCED MAX ST   Take 30 mLs by mouth every 4 hours as needed for indigestion   Last time this was given:  15 mLs on 3/7/2018  5:06 AM                                amoxicillin-clavulanate 875-125 MG per tablet   Commonly known as:  AUGMENTIN   Take 1 tablet by mouth 2 times daily                                blood glucose lancets standard   Commonly known as:  no brand specified   Use to test blood sugar four times daily or as directed.                                * blood glucose monitoring meter device kit   Commonly known as:  no brand specified   Use to test blood sugar four times daily or as directed.                                * blood glucose monitoring meter device kit   Commonly known as:  no brand specified   Use to test blood sugar 4 times daily or as directed.                                * blood glucose monitoring test strip   Commonly known as:  no brand specified   Use to test blood sugars four times daily or as directed                                * blood glucose monitoring test strip   Commonly known as:  no brand specified   1 strip by  In Vitro route 4 times daily (before meals and nightly) Use to test blood sugars 4 times daily or as directed                                Tpslfrv-Vlwuj-Mczqaszu-TenofAF 813-226-082-10 MG Tabs per tablet   Commonly known as:  GENVOYA   Take 1 tablet by mouth daily (with breakfast)   Start taking on:  3/8/2018   Last time this was given:  1 tablet on 3/7/2018 10:36 AM                                ibuprofen 600 MG tablet   Commonly known as:  ADVIL/MOTRIN   Take 1 tablet (600 mg) by mouth every 8 hours as needed for moderate pain   Last time this was given:  600 mg on 3/6/2018 10:34 PM                                insulin pen needle 31G X 8 MM   Commonly known as:  B-D U/F   Use 1 pen needles daily or as directed.                                simethicone 125 MG Chew chewable tablet   Commonly known as:  GAS-X EXTRA STRENGTH   Take 1 tablet (125 mg) by mouth 2 times daily                                TRULICITY 1.5 MG/0.5ML pen   Inject 1.5 mg Subcutaneous every 7 days   Generic drug:  dulaglutide                                * Notice:  This list has 4 medication(s) that are the same as other medications prescribed for you. Read the directions carefully, and ask your doctor or other care provider to review them with you.

## 2018-03-06 NOTE — IP AVS SNAPSHOT
[address] [address]    Phone:  [phone]                                       After Visit Summary   3/6/2018    Andrew Lockwood    MRN: [id number]           After Visit Summary Signature Page     I have received my discharge instructions, and my questions have been answered. I have discussed any challenges I see with this plan with the nurse or doctor.    ..........................................................................................................................................  Patient/Patient Representative Signature      ..........................................................................................................................................  Patient Representative Print Name and Relationship to Patient    ..................................................               ................................................  Date                                            Time    ..........................................................................................................................................  Reviewed by Signature/Title    ...................................................              ..............................................  Date                                                            Time

## 2018-03-07 ENCOUNTER — TELEPHONE (OUTPATIENT)
Dept: PHARMACY | Facility: CLINIC | Age: 53
End: 2018-03-07

## 2018-03-07 VITALS
DIASTOLIC BLOOD PRESSURE: 70 MMHG | TEMPERATURE: 99.2 F | OXYGEN SATURATION: 97 % | SYSTOLIC BLOOD PRESSURE: 121 MMHG | RESPIRATION RATE: 18 BRPM | HEART RATE: 83 BPM | BODY MASS INDEX: 27.99 KG/M2 | HEIGHT: 65 IN | WEIGHT: 167.99 LBS

## 2018-03-07 PROBLEM — H66.90 OTITIS MEDIA: Status: ACTIVE | Noted: 2018-03-07

## 2018-03-07 LAB
ANION GAP SERPL CALCULATED.3IONS-SCNC: 8 MMOL/L (ref 3–14)
BASOPHILS # BLD AUTO: 0 10E9/L (ref 0–0.2)
BASOPHILS NFR BLD AUTO: 0.3 %
BUN SERPL-MCNC: 7 MG/DL (ref 7–30)
CALCIUM SERPL-MCNC: 8.4 MG/DL (ref 8.5–10.1)
CD3 CELLS # BLD: 1393 CELLS/UL (ref 603–2990)
CD3 CELLS NFR BLD: 57 % (ref 49–84)
CD3+CD4+ CELLS # BLD: 256 CELLS/UL (ref 441–2156)
CD3+CD4+ CELLS NFR BLD: 10 % (ref 28–63)
CD3+CD4+ CELLS/CD3+CD8+ CLL BLD: 0.24 % (ref 1.4–2.6)
CD3+CD8+ CELLS # BLD: 1042 CELLS/UL (ref 125–1312)
CD3+CD8+ CELLS NFR BLD: 42 % (ref 10–40)
CHLORIDE SERPL-SCNC: 104 MMOL/L (ref 94–109)
CO2 SERPL-SCNC: 22 MMOL/L (ref 20–32)
CREAT SERPL-MCNC: 0.65 MG/DL (ref 0.66–1.25)
DIFFERENTIAL METHOD BLD: ABNORMAL
EOSINOPHIL # BLD AUTO: 0.1 10E9/L (ref 0–0.7)
EOSINOPHIL NFR BLD AUTO: 2 %
ERYTHROCYTE [DISTWIDTH] IN BLOOD BY AUTOMATED COUNT: 13.6 % (ref 10–15)
GFR SERPL CREATININE-BSD FRML MDRD: >90 ML/MIN/1.7M2
GLUCOSE BLDC GLUCOMTR-MCNC: 122 MG/DL (ref 70–99)
GLUCOSE BLDC GLUCOMTR-MCNC: 154 MG/DL (ref 70–99)
GLUCOSE BLDC GLUCOMTR-MCNC: 77 MG/DL (ref 70–99)
GLUCOSE BLDC GLUCOMTR-MCNC: 88 MG/DL (ref 70–99)
GLUCOSE SERPL-MCNC: 91 MG/DL (ref 70–99)
HCT VFR BLD AUTO: 38.6 % (ref 40–53)
HGB BLD-MCNC: 12.8 G/DL (ref 13.3–17.7)
IFC SPECIMEN: ABNORMAL
IMM GRANULOCYTES # BLD: 0.1 10E9/L (ref 0–0.4)
IMM GRANULOCYTES NFR BLD: 1 %
LYMPHOCYTES # BLD AUTO: 2.5 10E9/L (ref 0.8–5.3)
LYMPHOCYTES NFR BLD AUTO: 40.5 %
MCH RBC QN AUTO: 31.5 PG (ref 26.5–33)
MCHC RBC AUTO-ENTMCNC: 33.2 G/DL (ref 31.5–36.5)
MCV RBC AUTO: 95 FL (ref 78–100)
MONOCYTES # BLD AUTO: 0.8 10E9/L (ref 0–1.3)
MONOCYTES NFR BLD AUTO: 13.2 %
NEUTROPHILS # BLD AUTO: 2.6 10E9/L (ref 1.6–8.3)
NEUTROPHILS NFR BLD AUTO: 43 %
NRBC # BLD AUTO: 0 10*3/UL
NRBC BLD AUTO-RTO: 0 /100
PLATELET # BLD AUTO: 209 10E9/L (ref 150–450)
POTASSIUM SERPL-SCNC: 3.4 MMOL/L (ref 3.4–5.3)
RBC # BLD AUTO: 4.06 10E12/L (ref 4.4–5.9)
SODIUM SERPL-SCNC: 134 MMOL/L (ref 133–144)
WBC # BLD AUTO: 6.1 10E9/L (ref 4–11)

## 2018-03-07 PROCEDURE — 99207 ZZC CDG-CODE CATEGORY CHANGED: CPT | Performed by: INTERNAL MEDICINE

## 2018-03-07 PROCEDURE — 25000132 ZZH RX MED GY IP 250 OP 250 PS 637: Performed by: INTERNAL MEDICINE

## 2018-03-07 PROCEDURE — 86359 T CELLS TOTAL COUNT: CPT | Performed by: STUDENT IN AN ORGANIZED HEALTH CARE EDUCATION/TRAINING PROGRAM

## 2018-03-07 PROCEDURE — 00000146 ZZHCL STATISTIC GLUCOSE BY METER IP

## 2018-03-07 PROCEDURE — 40000141 ZZH STATISTIC PERIPHERAL IV START W/O US GUIDANCE

## 2018-03-07 PROCEDURE — 25000132 ZZH RX MED GY IP 250 OP 250 PS 637: Performed by: STUDENT IN AN ORGANIZED HEALTH CARE EDUCATION/TRAINING PROGRAM

## 2018-03-07 PROCEDURE — 96375 TX/PRO/DX INJ NEW DRUG ADDON: CPT | Performed by: EMERGENCY MEDICINE

## 2018-03-07 PROCEDURE — 12000001 ZZH R&B MED SURG/OB UMMC

## 2018-03-07 PROCEDURE — 87536 HIV-1 QUANT&REVRSE TRNSCRPJ: CPT | Performed by: STUDENT IN AN ORGANIZED HEALTH CARE EDUCATION/TRAINING PROGRAM

## 2018-03-07 PROCEDURE — 85025 COMPLETE CBC W/AUTO DIFF WBC: CPT | Performed by: STUDENT IN AN ORGANIZED HEALTH CARE EDUCATION/TRAINING PROGRAM

## 2018-03-07 PROCEDURE — 99235 HOSP IP/OBS SAME DATE MOD 70: CPT | Performed by: INTERNAL MEDICINE

## 2018-03-07 PROCEDURE — 86360 T CELL ABSOLUTE COUNT/RATIO: CPT | Performed by: STUDENT IN AN ORGANIZED HEALTH CARE EDUCATION/TRAINING PROGRAM

## 2018-03-07 PROCEDURE — 25000128 H RX IP 250 OP 636: Performed by: EMERGENCY MEDICINE

## 2018-03-07 PROCEDURE — 36415 COLL VENOUS BLD VENIPUNCTURE: CPT | Performed by: STUDENT IN AN ORGANIZED HEALTH CARE EDUCATION/TRAINING PROGRAM

## 2018-03-07 PROCEDURE — 25000128 H RX IP 250 OP 636: Performed by: STUDENT IN AN ORGANIZED HEALTH CARE EDUCATION/TRAINING PROGRAM

## 2018-03-07 PROCEDURE — 25000125 ZZHC RX 250: Performed by: STUDENT IN AN ORGANIZED HEALTH CARE EDUCATION/TRAINING PROGRAM

## 2018-03-07 PROCEDURE — 80048 BASIC METABOLIC PNL TOTAL CA: CPT | Performed by: STUDENT IN AN ORGANIZED HEALTH CARE EDUCATION/TRAINING PROGRAM

## 2018-03-07 RX ORDER — IBUPROFEN 600 MG/1
600 TABLET, FILM COATED ORAL EVERY 8 HOURS PRN
Qty: 60 TABLET | Refills: 0 | Status: SHIPPED | OUTPATIENT
Start: 2018-03-07 | End: 2018-04-26

## 2018-03-07 RX ORDER — NALOXONE HYDROCHLORIDE 0.4 MG/ML
.1-.4 INJECTION, SOLUTION INTRAMUSCULAR; INTRAVENOUS; SUBCUTANEOUS
Status: DISCONTINUED | OUTPATIENT
Start: 2018-03-07 | End: 2018-03-07 | Stop reason: HOSPADM

## 2018-03-07 RX ORDER — ACETAMINOPHEN 325 MG/1
975 TABLET ORAL EVERY 6 HOURS
Status: DISCONTINUED | OUTPATIENT
Start: 2018-03-07 | End: 2018-03-07

## 2018-03-07 RX ORDER — ONDANSETRON 4 MG/1
4 TABLET, ORALLY DISINTEGRATING ORAL EVERY 6 HOURS PRN
Status: DISCONTINUED | OUTPATIENT
Start: 2018-03-07 | End: 2018-03-07 | Stop reason: HOSPADM

## 2018-03-07 RX ORDER — AMPICILLIN AND SULBACTAM 2; 1 G/1; G/1
3 INJECTION, POWDER, FOR SOLUTION INTRAMUSCULAR; INTRAVENOUS EVERY 6 HOURS
Status: DISCONTINUED | OUTPATIENT
Start: 2018-03-07 | End: 2018-03-07

## 2018-03-07 RX ORDER — SODIUM CHLORIDE 9 MG/ML
1000 INJECTION, SOLUTION INTRAVENOUS CONTINUOUS
Status: DISCONTINUED | OUTPATIENT
Start: 2018-03-07 | End: 2018-03-07 | Stop reason: HOSPADM

## 2018-03-07 RX ORDER — NICOTINE POLACRILEX 4 MG
15-30 LOZENGE BUCCAL
Status: DISCONTINUED | OUTPATIENT
Start: 2018-03-07 | End: 2018-03-07 | Stop reason: HOSPADM

## 2018-03-07 RX ORDER — AMOXICILLIN 250 MG
1 CAPSULE ORAL 2 TIMES DAILY PRN
Status: DISCONTINUED | OUTPATIENT
Start: 2018-03-07 | End: 2018-03-07 | Stop reason: HOSPADM

## 2018-03-07 RX ORDER — LIDOCAINE 40 MG/G
CREAM TOPICAL
Status: DISCONTINUED | OUTPATIENT
Start: 2018-03-07 | End: 2018-03-07 | Stop reason: HOSPADM

## 2018-03-07 RX ORDER — GABAPENTIN 300 MG/1
300 CAPSULE ORAL 3 TIMES DAILY
Status: DISCONTINUED | OUTPATIENT
Start: 2018-03-07 | End: 2018-03-07 | Stop reason: HOSPADM

## 2018-03-07 RX ORDER — ACETAMINOPHEN 325 MG/1
650 TABLET ORAL EVERY 4 HOURS PRN
Status: DISCONTINUED | OUTPATIENT
Start: 2018-03-07 | End: 2018-03-07

## 2018-03-07 RX ORDER — ONDANSETRON 2 MG/ML
4 INJECTION INTRAMUSCULAR; INTRAVENOUS EVERY 6 HOURS PRN
Status: DISCONTINUED | OUTPATIENT
Start: 2018-03-07 | End: 2018-03-07 | Stop reason: HOSPADM

## 2018-03-07 RX ORDER — POTASSIUM CHLORIDE 750 MG/1
40 TABLET, EXTENDED RELEASE ORAL ONCE
Status: COMPLETED | OUTPATIENT
Start: 2018-03-07 | End: 2018-03-07

## 2018-03-07 RX ORDER — AMOXICILLIN 250 MG
2 CAPSULE ORAL 2 TIMES DAILY PRN
Status: DISCONTINUED | OUTPATIENT
Start: 2018-03-07 | End: 2018-03-07 | Stop reason: HOSPADM

## 2018-03-07 RX ORDER — DEXTROSE MONOHYDRATE 25 G/50ML
25-50 INJECTION, SOLUTION INTRAVENOUS
Status: DISCONTINUED | OUTPATIENT
Start: 2018-03-07 | End: 2018-03-07 | Stop reason: HOSPADM

## 2018-03-07 RX ORDER — NAPROXEN 500 MG/1
500 TABLET ORAL 2 TIMES DAILY PRN
Status: DISCONTINUED | OUTPATIENT
Start: 2018-03-07 | End: 2018-03-07 | Stop reason: HOSPADM

## 2018-03-07 RX ORDER — MORPHINE SULFATE 4 MG/ML
4 INJECTION, SOLUTION INTRAMUSCULAR; INTRAVENOUS ONCE
Status: COMPLETED | OUTPATIENT
Start: 2018-03-07 | End: 2018-03-07

## 2018-03-07 RX ORDER — POLYETHYLENE GLYCOL 3350 17 G/17G
17 POWDER, FOR SOLUTION ORAL DAILY PRN
Status: DISCONTINUED | OUTPATIENT
Start: 2018-03-07 | End: 2018-03-07 | Stop reason: HOSPADM

## 2018-03-07 RX ORDER — ACETAMINOPHEN 325 MG/1
650 TABLET ORAL EVERY 4 HOURS
Status: DISCONTINUED | OUTPATIENT
Start: 2018-03-07 | End: 2018-03-07

## 2018-03-07 RX ORDER — PROCHLORPERAZINE MALEATE 5 MG
10 TABLET ORAL EVERY 6 HOURS PRN
Status: DISCONTINUED | OUTPATIENT
Start: 2018-03-07 | End: 2018-03-07 | Stop reason: HOSPADM

## 2018-03-07 RX ORDER — PROCHLORPERAZINE 25 MG
25 SUPPOSITORY, RECTAL RECTAL EVERY 12 HOURS PRN
Status: DISCONTINUED | OUTPATIENT
Start: 2018-03-07 | End: 2018-03-07 | Stop reason: HOSPADM

## 2018-03-07 RX ORDER — LANOLIN ALCOHOL/MO/W.PET/CERES
3-6 CREAM (GRAM) TOPICAL
Status: DISCONTINUED | OUTPATIENT
Start: 2018-03-07 | End: 2018-03-07 | Stop reason: HOSPADM

## 2018-03-07 RX ORDER — SIMETHICONE 80 MG
160 TABLET,CHEWABLE ORAL 2 TIMES DAILY
Status: DISCONTINUED | OUTPATIENT
Start: 2018-03-07 | End: 2018-03-07 | Stop reason: HOSPADM

## 2018-03-07 RX ADMIN — ELVITEGRAVIR, COBICISTAT, EMTRICITABINE, AND TENOFOVIR ALAFENAMIDE 1 TABLET: 150; 150; 200; 10 TABLET ORAL at 10:36

## 2018-03-07 RX ADMIN — SODIUM CHLORIDE 1000 ML: 9 INJECTION, SOLUTION INTRAVENOUS at 09:15

## 2018-03-07 RX ADMIN — AMOXICILLIN AND CLAVULANATE POTASSIUM 1 TABLET: 875; 125 TABLET, FILM COATED ORAL at 13:30

## 2018-03-07 RX ADMIN — POTASSIUM CHLORIDE 40 MEQ: 750 TABLET, EXTENDED RELEASE ORAL at 05:07

## 2018-03-07 RX ADMIN — NAPROXEN 500 MG: 500 TABLET ORAL at 06:15

## 2018-03-07 RX ADMIN — SODIUM CHLORIDE 1000 ML: 9 INJECTION, SOLUTION INTRAVENOUS at 05:10

## 2018-03-07 RX ADMIN — SODIUM CHLORIDE 1000 ML: 9 INJECTION, SOLUTION INTRAVENOUS at 00:23

## 2018-03-07 RX ADMIN — GABAPENTIN 300 MG: 300 CAPSULE ORAL at 13:31

## 2018-03-07 RX ADMIN — AMPICILLIN SODIUM AND SULBACTAM SODIUM 3 G: 2; 1 INJECTION, POWDER, FOR SOLUTION INTRAMUSCULAR; INTRAVENOUS at 05:55

## 2018-03-07 RX ADMIN — GABAPENTIN 300 MG: 300 CAPSULE ORAL at 10:35

## 2018-03-07 RX ADMIN — LIDOCAINE HYDROCHLORIDE 30 ML: 20 SOLUTION ORAL; TOPICAL at 05:06

## 2018-03-07 RX ADMIN — MORPHINE SULFATE 4 MG: 4 INJECTION, SOLUTION INTRAMUSCULAR; INTRAVENOUS at 02:19

## 2018-03-07 ASSESSMENT — ACTIVITIES OF DAILY LIVING (ADL)
BATHING: 0-->INDEPENDENT
AMBULATION: 0-->INDEPENDENT
FALL_HISTORY_WITHIN_LAST_SIX_MONTHS: NO
TRANSFERRING: 0-->INDEPENDENT
RETIRED_EATING: 0-->INDEPENDENT
SWALLOWING: 0-->SWALLOWS FOODS/LIQUIDS WITHOUT DIFFICULTY
RETIRED_COMMUNICATION: 0-->UNDERSTANDS/COMMUNICATES WITHOUT DIFFICULTY
COGNITION: 0 - NO COGNITION ISSUES REPORTED
DRESS: 0-->INDEPENDENT
TOILETING: 0-->INDEPENDENT

## 2018-03-07 NOTE — H&P
INTERNAL MEDICINE HISTORY & PHYSICAL   Andrew Lockwood ([id number]) admitted on 3/6/2018  Primary care provider: Rubio Cronin         Chief Complaint:     Left ear pain         History of Present Illness     Andrew Lockwood is a 50 year old male with history of HIV/AIDS, DM2, CNS toxo, and recent appendicitis c/b abscess, who presented to the ED for 3 days of left ear pain. Pain started 3/3/18, was in correction at the time, released from correction today. RN at correction looked in ear and thought it looked infected, but he did not receive treatment with antibiotics at that time. Reports having pain in and around the ear, associated with diffuse headaches, chills, and nausea. Also having decreased hearing in left ear. Has some dizziness with standing, but this is chronic. Denies vomiting, sensitivity to light/sound, fevers/chills, SOB, diarrhea, change in strength, n/t.     Additionally, patient states he did not have access to his HIV medications while in correction (x6 days through 3/6/18). His last CD4 count was 225 in 12/2017, and viral load was 52 copies/mL at that time. He was last seen at [facility] on 2/22/18 by Dr. Rosado.     In the ED, he was hemodynamically stable, though mildly tachycardic, and afebrile. Labs were relatively unremarkable, except for CRP of 53. There was concern for mastoiditis, so CT-head was done and showed findings c/w otitis media, no evidence of mastoiditis. ENT was consulted, initially felt patient was stable to discharge with PO abx and close follow-up, but after further evaluation felt it best to admit the patient for IV abx. He will be admitted to medicine for further management.          Past Medical History     Patient Active Problem List   Diagnosis     Allergic rhinitis due to other allergen     Brain lesion     Toxoplasma encephalitis (H)     Pulmonary nodules     Human immunodeficiency virus I infection (H)     Folliculitis     Prurigo nodularis     Epidermal cyst     Shoulder joint  pain, unspecified laterality     CNS toxoplasmosis (H)     Constipation     Non-intractable vomiting with nausea, vomiting of unspecified type     Thrush     Insomnia, unspecified insomnia     Bowel obstruction     Toxoplasmosis     Gastroesophageal reflux disease     Headache     Aphthous ulcer     Type 2 diabetes mellitus (H)     Small bowel obstruction     SBO (small bowel obstruction)     Slow transit constipation     Periungual wart     Herpes zoster     Erectile dysfunction, unspecified erectile dysfunction type     Insomnia, unspecified type     Preventative health care     Pain of right thumb     Rupture of ulnar collateral ligament of right thumb     Aftercare following surgery of the musculoskeletal system     Panic disorder     Generalized anxiety disorder     Major depressive disorder, single episode, unspecified     Abdominal pain     Acute appendicitis with localized peritonitis     S/P appendectomy     Abdominal abscess (H)     Appendicitis           Past Surgical History     Past Surgical History:   Procedure Laterality Date     C NONSPECIFIC PROCEDURE      right forearm fracture     COLONOSCOPY Left 1/22/2016    Procedure: COMBINED COLONOSCOPY, SINGLE OR MULTIPLE BIOPSY/POLYPECTOMY BY BIOPSY;  Surgeon: Clark Saini MD;  Location: [facility]     HC EXPLORE UNDESC TESTIS,INGUIN/SCROTAL       LAPAROSCOPIC APPENDECTOMY N/A 1/31/2018    Procedure: LAPAROSCOPIC APPENDECTOMY;  LAPAROSCOPIC APPENDECTOMY;  Surgeon: Dawn Holt MD;  Location: [facility] OR     LAPAROSCOPY DIAGNOSTIC (GENERAL) N/A 7/26/2016    Procedure: LAPAROSCOPY DIAGNOSTIC (GENERAL);  Surgeon: Susannah Arriaga MD;  Location: [facility] OR     OPTICAL TRACKING SYSTEM CRANIOTOMY, EXCISE TUMOR, COMBINED Left 4/10/2015    Procedure: COMBINED OPTICAL TRACKING SYSTEM CRANIOTOMY, EXCISE TUMOR;  Surgeon: Mirlande Colmenares MD;  Location: [facility] OR     REPAIR GAMEKEEPER'S THUMB Right 12/2/2016    Procedure: REPAIR LIGAMENT ULNAR COLLATERAL  THUMB (GAMEKEEPER'S);  Surgeon: Evin Zamorano MD;  Location[facility]            Medications     No current facility-administered medications on file prior to encounter.   Current Outpatient Prescriptions on File Prior to Encounter:  dulaglutide (TRULICITY) 1.5 MG/0.5ML pen Inject 1.5 mg Subcutaneous every 7 days   insulin pen needle (B-D U/F) 31G X 8 MM Use 1 pen needles daily or as directed.   simethicone (GAS-X EXTRA STRENGTH) 125 MG CHEW chewable tablet Take 1 tablet (125 mg) by mouth 2 times daily   gabapentin (NEURONTIN) 300 MG capsule Take 1 capsule (300 mg) by mouth 3 times daily for 3 days   acetaminophen (TYLENOL) 325 MG tablet Take 2 tablets (650 mg) by mouth every 4 hours   alum & mag hydroxide-simethicone (MAALOX ADVANCED MAX ST) 400-400-40 MG/5ML SUSP suspension Take 30 mLs by mouth every 4 hours as needed for indigestion (Patient not taking: Reported on 2/22/2018)   blood glucose monitoring (NO BRAND SPECIFIED) meter device kit Use to test blood sugar 4 times daily or as directed.   blood glucose monitoring (NO BRAND SPECIFIED) test strip 1 strip by In Vitro route 4 times daily (before meals and nightly) Use to test blood sugars 4 times daily or as directed   blood glucose monitoring (NO BRAND SPECIFIED) meter device kit Use to test blood sugar four times daily or as directed. (Patient not taking: Reported on 2/22/2018)   blood glucose monitoring (NO BRAND SPECIFIED) test strip Use to test blood sugars four times daily or as directed   blood glucose (NO BRAND SPECIFIED) lancets standard Use to test blood sugar four times daily or as directed. (Patient not taking: Reported on 2/22/2018)            Allergies     Allergies   Allergen Reactions     Metformin      Abdominal pain     Milk [Lac Bovis] Hives     Tylenol [Acetaminophen] Itching            Social History   Patient is currently homeless. Staying with friends as able.     Tobacco: Current 1/4ppd smoker  EtOH: Quit in 2007  Other drugs:  "Never    Exposure history: Recently in prison for 6 days    Social History     Social History     Marital status: Single     Spouse name: N/A     Number of children: N/A     Years of education: N/A     Occupational History           5 years; temp agency     Social History Main Topics     Smoking status: Current Every Day Smoker     Packs/day: 0.20     Last attempt to quit: 10/28/2016     Smokeless tobacco: Former User      Comment: 4 cigarettes     Alcohol use No      Comment: Last etoh in 2007     Drug use: No      Comment: \"Not very often\"     Sexual activity: Not Currently     Partners: Female, Male      Comment: Last sexual activity 2008     Other Topics Concern     Not on file     Social History Narrative    Born in [facility].  [place]ame to the [place] in 1993.  Last traveled to visit family in 2008.                 Family History     Family History   Problem Relation Age of Onset     DIABETES Brother      DIABETES Father      Alzheimer Disease Father      Unknown/Adopted Mother      DIABETES Paternal Grandfather      CANCER No family hx of      no skin cancer     Skin Cancer No family hx of      no famiy hx of skin cancer            Review of Systems     Complete 10-pt ROS negative except as noted in HPI.          Vitals and Exam     Physical exam:  /84 (BP Location: Right arm)  Pulse 109  Temp 99.2  F (37.3  C) (Oral)  Resp 18  Ht 1.651 m (5' 5\")  Wt 76.2 kg (167 lb 15.9 oz)  SpO2 98%  BMI 27.96 kg/m2    Physical Exam:   General: Sitting up in bed, NAD  HEENT: Sclerae non-icteric. MMM. PERRL, EOMI. External ears normal bilaterally. No erythema, drainage, or edema. Exquisite tenderness to palpation over left mastoid. No skin changes overlying mastoid, no fluctuance. Nontender over TMJ.   Cardiac: Tachycardic, regular rhythm. No m/r/g. Normal S1, S2.  Pulm: Normal respiratory effort. CTAB, no wheezes, crackles, or rhonchi.   Abd: Soft, non distended, non tender abdomen. No " hepatosplenomegaly.  Skin: No jaundice, no rash.  Extremities: No LE edema. No cyanosis. No clubbing.  Joints: No inflammation noted on joints.  Neuro: A&Ox3, no focal deficits. Normal speech.   Psych: Euthymic mood, full affect         Labs/Imaging   CBC  Recent Labs  Lab 03/06/18  2105   WBC 5.7   RBC 4.40   HGB 13.8   HCT 42.2   MCV 96   MCH 31.4   MCHC 32.7   RDW 13.7          BMP  Recent Labs  Lab 03/06/18  2105      POTASSIUM 3.3*   CHLORIDE 105   CO2 28   ANIONGAP 7   *   BUN 17   CR 0.81   GFRESTIMATED >90   GFRESTBLACK >90   LINDA 9.0         Imaging/procedure results:    # CT temporal/orbital/sella w/ contrast 3/6/18:   Preliminary Results. Full dictation to follow.   Impression:    1. Left otitis media with left middle ear effusion which may indicate  chronicity.   2. Left mastoiditis.    # Flexible Nasopharyngoscopy 3/6/18:   The left nasal passage was sprayed with phenylephrine. The flexible endoscope was introduced into the left nasal passage. The left nasal cavity had healthy mucosa noted over the middle and inferior turbinates. The nasal septum was intact with no perforation or hematoma. The scope was advance to the level of the nasopharynx. There is a small adenoid pad present. Both eustachian tube orifices were present with no evidence of obstruction. No concern for neoplastic lesions.    ASSESSMENT & PLAN :    50 year old male with history of HIV/AIDS, DM2, CNS toxo, and recent appendicitis c/b abscess, who presented to the ED for 3 days of left ear pain, found to have AOM w/o evidence of mastoiditis.     # Acute otitis media  No evidence of mastoiditis on CT (prelim read states mastoiditis, but ENT interpretation does not) or ENT exam.   -- Unasyn per ENT  -- ENT consult  -- Naproxen 500mg BID prn    # HIV/AIDS  # History of Toxoplasmosis w/ ongoing MRI abnormalities  Tolerating Genvoya. Per ID, current MRI findings (2/18/18) c/w old Toxo scarring; new lesions or progressive  "disease unlikely given CD4 count and recent Bactrim use. He has been virally suppressed since 2016, and off Bactrim for several months as his CD4 had been >100 since 2016 and >200 since 8/2017.   -- CD4 count and viral load in am  -- Continue Genvoya  -- Per last ID note is due for anal pap with colorectal    # DM2  Follows with endocrine as outpatient. Last A1c 7.5% in 2/2018. He does not tolerate metformin due to stomach upset and cramps. Has most recently been on glipizide 2.5-5mg qday, but he reports frequent hypoglycemia on this. Plan had been to start him on an injectable like Trulicity, however due to his social situation and inability to store meds safely, last telephone encounters from endocrine state this was not started. Patient however states that it was indeed started, and that he was due for his third dose 2 days ago. He states he goes to the [facility] pharmacy on Mondays, they give him a dose (0.75mg q7days), he injects it in the bathroom, and he is good for a week; however, there is no record of this prescription or notes of such. Spoke with pharmacy who is also unable to locate any outpatient records of such.    -- Hold Glipizide  -- Continue gabapentin  -- Glucose checks QID AC/HS, hypoglycemia protocol  -- LDSSI  -- Will need to call [facility] pharmacy in am to verify whether patient getting dulaglutide and what dose is    # Hx appendicitis c/b abdominal abscess  S/p appendectomy. Healing well and off abx.     # Social situation  Per chart review, social work has been involved. From telephone encounter on 2/23/18:   \"This is a summary note related to pts ongoing stressors related to homelessness, poverty, immigration status/complications and his limited ability to manage  his complex chronic care needs. After several failed intake appts, he was able to complete phone intake for [facility] case mgmt yesterday. The intent is to encourage his use of the elaborate resources for housing, , advocacy and resource " "coordination through [facility]. He is continuing to stay with a friend, even though this is not ideal, it seems adequate for the moment. He also voices various intentions about moving out of state and is currently discussing gender dysmorphic tendencies. He has limited ability to focus and manage his day to day affairs. He does know very well how to access emergent community resources for food, transpotation. Even though he has all these challenges, he manages to get his basic needs met . He has a very sophisticated  addressing permanetcy and his is hoping to get a  at  [facility] for minor unresolved citations which are affecting his eligibility for [employer] financial aid. He frequently walks in to the [facility] to ask for advice from his various clinical relationships. The ID  Clinic program knows him historically the best and will continue to assist.   Simon Cadena, [facility]    [phone]\"    FEN: NS @125cc/hr  Replete lytes prn  Consistent carb diet  Prophylaxis:  DVT: Mechanical   GI: Not indicated  Disposition: Admit to inpatient, dispo pending safe discharge plan and PO abx plan.   Code Status: FULL CODE    Kacy Jernigan MD  Internal Medicine PGY-2  P: [id number]    Patient will be formally staffed in the morning.     "

## 2018-03-07 NOTE — PLAN OF CARE
"Problem: Patient Care Overview  Goal: Plan of Care/Patient Progress Review  Outcome: No Change    /70 (BP Location: Right arm)  Pulse 83  Temp 99.2  F (37.3  C) (Oral)  Resp 18  Ht 1.651 m (5' 5\")  Wt 76.2 kg (167 lb 15.9 oz)  SpO2 97%  BMI 27.96 kg/m2    [id number]:  Pt admitted to 5A @ 0300 from ED w/ ear infection and pain.  Pt A&O.  Excitable and cooperative.  Up independently.  VSS on RA.  L PIV w/ NS @ 125.  IV Unasyn.  GI cocktail given x 1 for c/o heartburn.  Naproxen given x 1 for pain.  K=3.3, replaced w/ PO K+.  BG=77.  Voiding.  No BM this shift.  Mod CHO diet.  ENT consult today. Friend @ bedside.  Continue to monitor and follow POC.        "

## 2018-03-07 NOTE — PLAN OF CARE
AVSS, denied pain. Appetite good. Frequently walking in halls with friend, very active. PIV removed this morning, new PIV placed and then removed at 1330 for discharge. Antibiotic changed to PO form, 1st dose administered. Discharge instructions, medications, follow-up appointments discussed with patient. All information understood. Paperwork signed, copy given to patient. Patient left on foot with friend, planned to stop at [facility] on way [place].

## 2018-03-07 NOTE — CONSULTS
Please see dedicated ENT consult note signed by Dr. Christel Conroy on 3/6/2018.    Conchita Shi PA-C  Otolaryngology-Head & Neck Surgery  Please contact ENT by dialing [phone] and entering job code [phone].

## 2018-03-07 NOTE — PROGRESS NOTES
"Otolaryngology Progress Note  March 7, 2018    S: No acute events overnight. Patient reports ongoing left ear pain. Has been up and walking in the halls independently.    O: /70 (BP Location: Right arm)  Pulse 83  Temp 99.2  F (37.3  C) (Oral)  Resp 18  Ht 1.651 m (5' 5\")  Wt 76.2 kg (167 lb 15.9 oz)  SpO2 97%  BMI 27.96 kg/m2   General: Alert and oriented x 3, No acute distress   HEENT: EOMI. HB 1/6. Left TM bulging with purulent effusion and erythematous, intact without perforation. No erythema or swelling of the EAC. Pain on palpation of the pinna and over the mastoid. Palpable enlarged LN just inferior to left ear. No erythema or swelling over the mastoid, no fluctuance. Hearing grossly intact.    Pulmonary: Breathing non-labored, no stridor, no accessory muscle use.    LABS:  ROUTINE IP LABS (Last four results)  BMP  Recent Labs  Lab 03/07/18  0745 03/06/18  2105    140   POTASSIUM 3.4 3.3*   CHLORIDE 104 105   LINDA 8.4* 9.0   CO2 22 28   BUN 7 17   CR 0.65* 0.81   GLC 91 114*     CBC  Recent Labs  Lab 03/07/18  0745 03/06/18  2105   WBC 6.1 5.7   RBC 4.06* 4.40   HGB 12.8* 13.8   HCT 38.6* 42.2   MCV 95 96   MCH 31.5 31.4   MCHC 33.2 32.7   RDW 13.6 13.7    212     INRNo lab results found in last 7 days.    A/P: Andrew Lockwood is a 50 year old male with a history notable for DM2, HIV/AIDS, CNS toxoplasmosis who presented to the ED 3/6 with a 3 day history of left sided otalgia and facial pain. On exam, there are clinical signs of acute otitis media, but no evidence of mastoiditis. Nasopharyngoscopy showed no neoplastic or obstructive lesions around the left eustachian tube orifice. CT scan does show evidence of fluid/opacification of the left middle ear space with some fluid in the left mastoid air cells, but no coalescence, bony erosion, or subperiosteal abscess. He is currently afebrile and vitally stable with no clinical evidence of CNS involvement or mastoiditis.     - No acute ENT " intervention required at this time  - Continue antibiotics, may transition to oral Augmentin and complete a total 7-10 day course for routine acute otitis management   - Follow up in ENT clinic or with primary care physician in 7 days  - Pain control and other cares per medicine team  - The patient was counseled on the signs/symptoms of meningitis and was instructed to return to the ED if these symptoms were to develop after discharge  - Please contact ENT with questions/concerns    -- Patient and above plan discussed with Dr. Marycarmen Chen PA-C  Otolaryngology-Head & Neck Surgery  Please contact ENT with questions by dialing [phone] and entering job code [phone] when prompted.

## 2018-03-07 NOTE — PROGRESS NOTES
Care Coordinator Progress Note     Admission Date/Time:  3/6/2018  Attending MD:  Shaw Lima, *     Data  Chart reviewed, discussed with interdisciplinary team.   Patient was admitted for:    OME (otitis media with effusion), left  AIDS (acquired immune deficiency syndrome) (H)  Left ear pain.    Coordination of Care and Referrals: Provided patient/family with options for nothing at this time.        Assessment  Andrew Lockwood is a 50 year old male with history of HIV/AIDS, DM2, CNS toxo, and recent appendicitis c/b abscess, who presented to the ED for 3 days of left ear pain. Pain started 3/3/18, was in long term at the time, released from long term today. RN at long term looked in ear and thought it looked infected, but he did not receive treatment with antibiotics at that time.    Attempted to see patient a couple different times and he has not been in his room.  Chart reviewed.  Patient has ID social work following patient as outpatient.  He is chronically homeless, but he has community support through  with infections disease clinic and Cathryn GRAY with [employer] ([place] [employer]  Patient has bus pass for transportation.     Plan  Anticipated Discharge Date:  today  Anticipated Discharge Plan:  To stay with friend    Maranda Henderson RN, BSN  Care Coordinator [facility]  Pager: [phone]  Phone: [phone]

## 2018-03-07 NOTE — TELEPHONE ENCOUNTER
Filled 3 days of his medbox. He will return Friday for another week. Pt is homeless so we only give a few days at a time.    Follow up: 03/09/18    Lenka Mabry, [facility]  [phone]

## 2018-03-07 NOTE — ED NOTES
Initial Assessment: Pt with c/o left ear pain and discomfort. Currently, pt is visiting and laughing with friend while watching T.V. No acute discomfort noted at this time. Communicates needs without difficulty. Pain medication given one hour ago.

## 2018-03-07 NOTE — ED NOTES
[facility], [place]   ED Nurse to Floor Handoff     Andrew Lockwood is a 50 year old male who speaks Chinese and lives alone,  in a home  They arrived in the ED by car from home    ED Chief Complaint: Otalgia    ED Dx;   Final diagnoses:   OME (otitis media with effusion), left   AIDS (acquired immune deficiency syndrome) (H)         Needed?: No    Allergies:   Allergies   Allergen Reactions     Metformin      Abdominal pain     Milk [Lac Bovis] Hives     Tylenol [Acetaminophen] Itching   .  Past Medical Hx:   Past Medical History:   Diagnosis Date     AIDS (H)      Allergic rhinitis due to other allergen     DNS     Chronic abdominal pain      CNS toxoplasmosis (H)      Diabetes type 2, controlled (H)      GERD (gastroesophageal reflux disease)      HIV (human immunodeficiency virus infection) (H)      Periungual wart      Sleep apnea     doesn't use CPAP      Baseline Mental status: WDL  Current Mental Status changes: at basesline    Infection: No  Sepsis suspected: No  Isolation type: No active isolations     Activity level - Baseline/Home:  Independent  Activity Level - Current:   Independent    Bariatric equipment needed?: No    In the ED these meds were given:   Medications   0.9% sodium chloride BOLUS (0 mLs Intravenous Stopped 3/6/18 2216)     Followed by   sodium chloride 0.9% infusion (1,000 mLs Intravenous New Bag 3/7/18 0023)   oxyCODONE IR (ROXICODONE) tablet 10 mg (10 mg Oral Given 3/6/18 2334)   lidocaine 3%, phenylephrine 0.25% solution for irrigation (not administered)   0.9% sodium chloride BOLUS (not administered)     Followed by   sodium chloride 0.9% infusion (not administered)   phenylephrine (NIALL-SYNEPHRINE) 0.25 % spray (not administered)   morphine (PF) injection 4 mg (4 mg Intravenous Given 3/7/18 0219)   ondansetron (ZOFRAN-ODT) ODT tab 4 mg (4 mg Oral Given 3/6/18 2116)   sodium chloride (PF) 0.9% PF flush 70 mL (70 mLs Intravenous Given 3/6/18 2221)  "  iopamidol (ISOVUE-370) solution 75 mL (75 mLs Intravenous Given 3/6/18 2221)   ibuprofen (ADVIL/MOTRIN) tablet 600 mg (600 mg Oral Given 3/6/18 2234)   ampicillin-sulbactam (UNASYN) 3 g vial to attach to  mL bag (0 g Intravenous Stopped 3/7/18 0027)       Drips running?  Yes    Home pump or pre-existing LDA's present? No    Labs results:   Labs Ordered and Resulted from Time of ED Arrival Up to the Time of Departure from the ED   BASIC METABOLIC PANEL - Abnormal; Notable for the following:        Result Value    Potassium 3.3 (*)     Glucose 114 (*)     All other components within normal limits   CRP INFLAMMATION - Abnormal; Notable for the following:     CRP Inflammation 53.0 (*)     All other components within normal limits   CBC WITH PLATELETS DIFFERENTIAL   LIPASE   NT PROBNP INPATIENT       Imaging Studies:   Recent Results (from the past 24 hour(s))   CT Temporal Orbital Sella w Contrast    Narrative    Preliminary Results. Full dictation to follow.        Impression    Impression:    1. Left otitis media with left middle ear effusion which may indicate  chronicity.   2. Left mastoiditis.         Recent vital signs:   /87  Pulse 133  Temp 99.5  F (37.5  C) (Oral)  Resp 18  Ht 1.651 m (5' 5\")  Wt 76.2 kg (168 lb)  SpO2 100%  BMI 27.96 kg/m2    Cardiac Rhythm: Normal Sinus  Pt needs tele? No  Skin/wound Issues: None    Code Status: Full Code    Pain control: fair    Nausea control: good    Abnormal labs/tests/findings requiring intervention: N/A      Family present during ED course? Yes   Family Comments/Social Situation comments: N/A      Tasks needing completion: None    Evin Ayers, SENG  asc-- [phone] [facility]  [phone] [facility] ED      "

## 2018-03-07 NOTE — CONSULTS
Otolaryngology Consult Note  March 6, 2018      CC: left otalgia    We were consulted by Stella Escoto with the Emergency Department for evaluation of Mr. Lockwood's left otalgia.    HPI:  Andrew Lockwood is a 50 year old male with a history notable for DM2, HIV/AIDS, CNS toxoplasmosis, recent appendicitis status post appendectomy complicated by intra-abdominal abscess, and chronic abdominal pain who presents to the ED for the evaluation of left-sided otalgia. The patient reports that he has had this pain for the past 3 days which has progressed in intensity throughout this duration. He states that he was in [facility] once the initial symptoms started, and was told that he had an ear infection when evaluated by a medical health care provider, though he states that he was not put onto any antibiotics while in [facility]. After getting out of [facility] today, he came to the ED for further evaluation. He reports that he has pain with manipulation of the ear, and left sided facial pain. No photophobia, phonophobia. He reports decreased/muffled hearing in his left ear. Also endorses some dizziness when standing from a seated . No vomiting or recorded fevers, but the patient has been nauseated. No chest pain, SOB, diarrhea, or focal neurological deficit.     Regarding his history of HIV, he reports that his last CD4 count was 225 on 12/12/17, but he notes that it is likely lower as he did not have access to his HIV ART medications while in [facility].  For his symptoms, the patient has been taking Tylenol. The patient offers no other concerns or complaints at this time.      Past Medical History:   Diagnosis Date     AIDS (H)      Allergic rhinitis due to other allergen     DNS     Chronic abdominal pain      CNS toxoplasmosis (H)      Diabetes type 2, controlled (H)      GERD (gastroesophageal reflux disease)      HIV (human immunodeficiency virus infection) (H)      Periungual wart      Sleep apnea     doesn't use CPAP       Past Surgical  History:   Procedure Laterality Date     C NONSPECIFIC PROCEDURE      right forearm fracture     COLONOSCOPY Left 1/22/2016    Procedure: COMBINED COLONOSCOPY, SINGLE OR MULTIPLE BIOPSY/POLYPECTOMY BY BIOPSY;  Surgeon: Clark Saini MD;  Location: [facility]     HC EXPLORE UNDESC TESTIS,INGUIN/SCROTAL       LAPAROSCOPIC APPENDECTOMY N/A 1/31/2018    Procedure: LAPAROSCOPIC APPENDECTOMY;  LAPAROSCOPIC APPENDECTOMY;  Surgeon: Dawn Holt MD;  Location: [facility]     LAPAROSCOPY DIAGNOSTIC (GENERAL) N/A 7/26/2016    Procedure: LAPAROSCOPY DIAGNOSTIC (GENERAL);  Surgeon: Susannah Arriaga MD;  Location: [facility]     OPTICAL TRACKING SYSTEM CRANIOTOMY, EXCISE TUMOR, COMBINED Left 4/10/2015    Procedure: COMBINED OPTICAL TRACKING SYSTEM CRANIOTOMY, EXCISE TUMOR;  Surgeon: Mirlande Colmenares MD;  Location: [facility]     REPAIR GAMEKEEPER'S THUMB Right 12/2/2016    Procedure: REPAIR LIGAMENT ULNAR COLLATERAL THUMB (GAMEKEEPER'S);  Surgeon: Evin Zamorano MD;  Location: [facility]       Current Outpatient Prescriptions   Medication Sig Dispense Refill     dulaglutide (TRULICITY) 1.5 MG/0.5ML pen Inject 1.5 mg Subcutaneous every 7 days       insulin pen needle (B-D U/F) 31G X 8 MM Use 1 pen needles daily or as directed. 100 each 0     simethicone (GAS-X EXTRA STRENGTH) 125 MG CHEW chewable tablet Take 1 tablet (125 mg) by mouth 2 times daily 120 tablet 0     gabapentin (NEURONTIN) 300 MG capsule Take 1 capsule (300 mg) by mouth 3 times daily for 3 days 9 capsule 0     acetaminophen (TYLENOL) 325 MG tablet Take 2 tablets (650 mg) by mouth every 4 hours 100 tablet 1     alum & mag hydroxide-simethicone (MAALOX ADVANCED MAX ST) 400-400-40 MG/5ML SUSP suspension Take 30 mLs by mouth every 4 hours as needed for indigestion (Patient not taking: Reported on 2/22/2018) 355 mL 0     blood glucose monitoring (NO BRAND SPECIFIED) meter device kit Use to test blood sugar 4 times daily or as directed. 1 kit 0     blood  "glucose monitoring (NO BRAND SPECIFIED) test strip 1 strip by In Vitro route 4 times daily (before meals and nightly) Use to test blood sugars 4 times daily or as directed 100 strip 11     blood glucose monitoring (NO BRAND SPECIFIED) meter device kit Use to test blood sugar four times daily or as directed. (Patient not taking: Reported on 2/22/2018) 1 kit 0     blood glucose monitoring (NO BRAND SPECIFIED) test strip Use to test blood sugars four times daily or as directed 100 each 5     blood glucose (NO BRAND SPECIFIED) lancets standard Use to test blood sugar four times daily or as directed. (Patient not taking: Reported on 2/22/2018) 100 Box 5          Allergies   Allergen Reactions     Metformin      Abdominal pain     Milk [Lac Bovis] Hives     Tylenol [Acetaminophen] Itching       Social History     Social History     Marital status: Single     Spouse name: N/A     Number of children: N/A     Years of education: N/A     Occupational History           5 years; temp agency     Social History Main Topics     Smoking status: Current Every Day Smoker     Packs/day: 0.20     Last attempt to quit: 10/28/2016     Smokeless tobacco: Former User      Comment: 4 cigarettes     Alcohol use No      Comment: Last etoh in 2007     Drug use: No      Comment: \"Not very often\"     Sexual activity: Not Currently     Partners: Female, Male      Comment: Last sexual activity 2008     Other Topics Concern     Not on file     Social History Narrative    Born in [facility].  Came to the [place] in 1993.  Last traveled to visit family in 2008.            Family History   Problem Relation Age of Onset     DIABETES Brother      DIABETES Father      Alzheimer Disease Father      Unknown/Adopted Mother      DIABETES Paternal Grandfather      CANCER No family hx of      no skin cancer     Skin Cancer No family hx of      no famiy hx of skin cancer       ROS: 12 point review of systems is negative unless noted in " "HPI.    PHYSICAL EXAM:  /84  Pulse 133  Temp 99.5  F (37.5  C) (Oral)  Resp 18  Ht 1.651 m (5' 5\")  Wt 76.2 kg (168 lb)  SpO2 100%  BMI 27.96 kg/m2  General: awake alert; appears to be in pain, but is conversant and oriented to person, place, and situation.  HEAD: normocephalic, atraumatic  Face: symmetrical, CN VII intact bilaterally (HB 1), no swelling, edema, or erythema. Sensation V1-V3 intact and equal bilaterally.   Eyes: EOMI without spontaneous or gaze evoked nystagmus, PERRL, clear sclera  Ears: right ear is normal, no otorrhea or auricular pain with manipulation, TM is visible and intact, no evidence of infection or effusion; left ear is normal, no otorrhea, pain with manipulation of the auricle, the TM is erythematous and bulging with obvious effusion, but no active purulence in the EAC, there is no erythema or skin changes of there mastoid, no fluctuance over the mastoid, but slight tenderness to deep palpation  Nose: no anterior drainage, intact and midline septum without perforation or hematoma   Mouth: moist, no ulcers, no jaw or tooth tenderness, tongue midline and symmetric  Oropharynx: tonsils within normal limits, uvula midline, no oropharyngeal erythema  Neck: no LAD, trachea midline  Neuro: cranial nerves 2-12 grossly intact; negative brudzinski's sign      ROUTINE IP LABS (Last four results)  BMP  Recent Labs  Lab 03/06/18  2105      POTASSIUM 3.3*   CHLORIDE 105   LINDA 9.0   CO2 28   BUN 17   CR 0.81   *     CBC  Recent Labs  Lab 03/06/18  2105   WBC 5.7   RBC 4.40   HGB 13.8   HCT 42.2   MCV 96   MCH 31.4   MCHC 32.7   RDW 13.7        INRNo lab results found in last 7 days.    Procedure:  Flexible Nasopharyngoscopy:  The left nasal passage was sprayed with phenylephrine. The flexible endoscope was introduced into the left nasal passage. The left nasal cavity had healthy mucosa noted over the middle and inferior turbinates. The nasal septum was intact with no " perforation or hematoma. The scope was advance to the level of the nasopharynx. There is a small adenoid pad present. Both eustachian tube orifices were present with no evidence of obstruction. No concern for neoplastic lesions.    Imaging:  CT Temporal Bone (3/6/18):  - Opacification noted within the left middle ear space and mastoid antral air cell with scattered opacification of the mastoid air cells. No coalescence noted or bony erosion of the mastoid. No evidence of subperiosteal abscess or mastoiditis. Imaging consistent with otitis media with mastoid effusion.    Assessment and Plan  Andrew Lockwood is a 50 year old male with a history notable for DM2, HIV/AIDS, CNS toxoplasmosis who presented to the ED this evening with a 3 day history of left sided otalgia and facial pain. On exam, there are clinical signs of acute otitis media, but no evidence of mastoiditis. Nasopharyngoscopy showed no neoplastic or obstructive lesions around the left eustachian tube orifice. CT scan does show evidence of fluid/opacification of the left middle ear space with some fluid in the left mastoid air cells, but no coalescence, bony erosion, or subperiosteal abscess. His WBC is 5.7, but an elevated CRP of 53. He is currently afebrile with stable vital signs; no clinical evidence of CNS involvement or mastoiditis.   - Due to his unknown current immunological status, would consider a Medicine consult for possible further assessment and workup  - No acute ENT intervention required at this time  - Continue antibiotics, completing a total 7-10 day course for routine acute otitis management   - Follow up in ENT clinic in 7 days  - Pain control  - The patient was counseled on the signs/symptoms of meningitis and was instructed to return to the ED if these symptoms were to arise after discharge  - Please contact ENT with questions/concerns    Adri Schulte MD  Otolaryngology-Head & Neck Surgery PGY2  Please page ENT with questions by  dialing [phone] and entering job code [phone] when prompted.    Physician Attestation   I, Christel Conroy, personally examined and evaluated this patient.  I discussed the patient with the resident and care team, and agree with the assessment and plan of care as documented in the resident s note of 3/7/18 [date].      I personally reviewed vital signs, medications, labs and imaging.    Key findings: 50yoM with history of HIV/AIDS, DM2, CNS toxoplasmosis who presents with ear pain. Is also homeless and reports not having had medications for diabetes or HIV for at least 6 days. On exam, non-toxic, but anxious. Good cervical ROM. Facial nerve fully intact bilaterally. L TM bulging, erythematous. No bulging/erythema over mastoid, no pain with manipulation of ear, EAC unremarkable. Imaging shows fluid in middle ear with associated mastoid fluid. In summary, patient with presumed immunocompromise presents with acute left otitis media without complications. Agree with plan for medical management. Please notify [facility] if any concern for symptom progression. Thank you for this consultation, please call with questions or concerns.  Christel Conroy MD MPH  Date of Service (when I saw the patient): 03/07/18

## 2018-03-07 NOTE — DISCHARGE SUMMARY
Discharge Summary    Andrew Lockwood MRN# [id number]   YOB: 1967 Age: 50 year old     Date of Admission:  3/6/2018  Date of Discharge:  3/7/2018  Admitting Physician:  Shaw Lima MD  Discharge Physician:  Alec Kimble MD  Discharging Service:  Internal Medicine     Primary Provider: Rubio Cronin          Discharge Diagnosis:     Otitis media    HIV    DM                 Discharge Disposition:   Discharged to home           Condition on Discharge:   Discharge condition: Stable   Code status on discharge: Full Code           Procedures:   No procedures performed during this admission          Discharge Medications:     Current Discharge Medication List      START taking these medications    Details   amoxicillin-clavulanate (AUGMENTIN) 875-125 MG per tablet Take 1 tablet by mouth 2 times daily  Qty: 28 tablet, Refills: 0    Associated Diagnoses: OME (otitis media with effusion), left      GENVOYA [phone] mg tablet Take 1 tablet by mouth daily (with breakfast)  Qty: 30 tablet, Refills: 0    Associated Diagnoses: AIDS (acquired immune deficiency syndrome) (H)      ibuprofen (ADVIL/MOTRIN) 600 MG tablet Take 1 tablet (600 mg) by mouth every 8 hours as needed for moderate pain  Qty: 60 tablet, Refills: 0    Associated Diagnoses: OME (otitis media with effusion), left         CONTINUE these medications which have NOT CHANGED    Details   dulaglutide (TRULICITY) 1.5 MG/0.5ML pen Inject 1.5 mg Subcutaneous every 7 days    Associated Diagnoses: Human immunodeficiency virus I infection (H); [facility] care      insulin pen needle (B-D U/F) 31G X 8 MM Use 1 pen needles daily or as directed.  Qty: 100 each, Refills: 0    Associated Diagnoses: Type 2 diabetes mellitus without complication, without long-term current use of insulin (H)      simethicone (GAS-X EXTRA STRENGTH) 125 MG CHEW chewable tablet Take 1 tablet (125 mg) by mouth 2 times daily  Qty: 120 tablet, Refills: 0     Associated Diagnoses: Gas pain      acetaminophen (TYLENOL) 325 MG tablet Take 2 tablets (650 mg) by mouth every 4 hours  Qty: 100 tablet, Refills: 1    Associated Diagnoses: Abdominal abscess (H)      alum & mag hydroxide-simethicone (MAALOX ADVANCED MAX ST) 400-400-40 MG/5ML SUSP suspension Take 30 mLs by mouth every 4 hours as needed for indigestion  Qty: 355 mL, Refills: 0      !! blood glucose monitoring (NO BRAND SPECIFIED) meter device kit Use to test blood sugar 4 times daily or as directed.  Qty: 1 kit, Refills: 0    Associated Diagnoses: Type 2 diabetes mellitus with complication, without long-term current use of insulin (H)      !! blood glucose monitoring (NO BRAND SPECIFIED) test strip 1 strip by In Vitro route 4 times daily (before meals and nightly) Use to test blood sugars 4 times daily or as directed  Qty: 100 strip, Refills: 11    Associated Diagnoses: Type 2 diabetes mellitus with complication, without long-term current use of insulin (H)      !! blood glucose monitoring (NO BRAND SPECIFIED) meter device kit Use to test blood sugar four times daily or as directed.  Qty: 1 kit, Refills: 0    Associated Diagnoses: Type 2 diabetes mellitus without complication, without long-term current use of insulin (H)      !! blood glucose monitoring (NO BRAND SPECIFIED) test strip Use to test blood sugars four times daily or as directed  Qty: 100 each, Refills: 5    Associated Diagnoses: Type 2 diabetes mellitus without complication, without long-term current use of insulin (H)      blood glucose (NO BRAND SPECIFIED) lancets standard Use to test blood sugar four times daily or as directed.  Qty: 100 Box, Refills: 5    Associated Diagnoses: Type 2 diabetes mellitus without complication, without long-term current use of insulin (H)       !! - Potential duplicate medications found. Please discuss with provider.      STOP taking these medications       gabapentin (NEURONTIN) 300 MG capsule Comments:   Reason for  "Stopping:                     Consultations:   ENT              Brief History of Illness:   Andrew Lockwood is a 50 year old male with history of HIV/AIDS, DM2, CNS toxo, and recent appendicitis c/b abscess, who presented to the ED for 3 days of left ear pain. Pain started 3/3/18, was in skilled nursing at the time, released from skilled nursing today. RN at skilled nursing looked in ear and thought it looked infected, but he did not receive treatment with antibiotics at that time. Reports having pain in and around the ear, associated with diffuse headaches, chills, and nausea. Also having decreased hearing in left ear. Has some dizziness with standing, but this is chronic. Denies vomiting, sensitivity to light/sound, fevers/chills, SOB, diarrhea, change in strength, n/t.           Hospital Course:   He was started on IVABx. Also consulted ENT for further eval. Per ENT  there are clinical signs of acute otitis media, but no evidence of mastoiditis. Nasopharyngoscopy showed no neoplastic or obstructive lesions around the left eustachian tube orifice. CT scan does show evidence of fluid/opacification of the left middle ear space with some fluid in the left mastoid air cells, but no coalescence, bony erosion, or subperiosteal abscess.he was recommended to continue antibiotics for 2 week and follow up with ENT or PCP in one week. .  He was able to ambulated with out any problem. He was discharged with 2 weeks of Augmentin.             Final Day of Progress before Discharge:       Physical Exam:  /70 (BP Location: Right arm)  Pulse 83  Temp 99.2  F (37.3  C) (Oral)  Resp 18  Ht 1.651 m (5' 5\")  Wt 76.2 kg (167 lb 15.9 oz)  SpO2 97%  BMI 27.96 kg/m2  Gen- pleasant   HEENT- NAD, LUISA, little tenderness behind the left year .   Neck- supple, no JVD elevation, no thyromegaly  CVS- I+II+ no m/r/g  CHEST: Appears symmetrical,moving equally with respirations.  RS- CTABL, No wheeze, No rhonchi  GI- Abdomen soft, no tenderness . Non distended, BS+ no " organomegaly   Ext- no edema, peripheral pulse intact,  CNS- AAA, no focal signs                Data:  All laboratory data reviewed             Significant Results:   Recent Results (from the past 48 hour(s))   CT Temporal Orbital Sella w Contrast    Narrative    CT TEMPORAL ORBITAL SELLA W CONTRAST 3/6/2018 10:40 PM    Provided History: concern for mastoiditis;      Comparison: MRI 2/18/2018, CT head 2/18/2018     Technique: Using multidetector thin collimation helical acquisition  technique, axial and coronal thin section CT images were reconstructed  through both temporal bones. Additional reconstructions performed in  the planes of Poschl and Stenver were also obtained. Images were  reviewed in a bone window.     Findings:   Right temporal bone: The mastoid air cells are minimally opacified.  There is no debris in the external auditory canal. The tympanic  membrane is intact. There is no fluid or soft tissue thickening in the  right middle ear cavity. The bony ossicles are intact and in normal  alignment. The epitympanum is clear. The bony scutum is sharp. The  internal auditory canal and the labyrinthine structures are within  normal limits. The facial nerve canal appears normal along its course.    Left temporal bone: The mastoid air cells are partially opacified. No  evidence for dehiscence. There is no debris in the external auditory  canal. The tympanic membrane is bulging. There is fluid in the left  middle ear cavity. The bony ossicles are intact and in normal  alignment. The epitympanum contains fluid. The bony scutum is sharp.  The internal auditory canal and the labyrinthine structures are within  normal limits. The facial nerve canal appears normal along its course.      Impression    Impression:   Left middle ear and mastoid fluid, new since 2/18/2018,  likely representing otomastoiditis.    I have personally reviewed the examination and initial interpretation  and I agree with the findings.    WANG  MD [facility]                Pending Results:   Unresulted Labs Ordered in the Past 30 Days of this Admission     Date and Time Order Name Status Description    3/7/2018 0249 HIV-1 RNA quantitative In process                   Discharge Instructions and Follow-Up:     Discharge Procedure Orders  Reason for your hospital stay   Order Comments: otitis media     Adult [facility] Follow-up and recommended labs and tests   Order Comments: Follow up with primary care provider, Rubio Cronin, within 7 days for hospital follow- up.  No follow up labs or test are needed.    Follow up with ENT in 2 weeks  Appointments on [place] and/or [facility] (with [facility] or [facility] provider or service). Call [phone] if you haven't heard regarding these appointments within 7 days of discharge.     Activity   Order Comments: Your activity upon discharge: activity as tolerated   Order Specific Question Answer Comments   Is discharge order? Yes      Full Code     Diet   Order Comments: Follow this diet upon discharge: Regular   Order Specific Question Answer Comments   Is discharge order? Yes             Attestation:  Alec Kimble.    Time spent on patient: 25 minutes total including face to face and coordinating care time reviewing current illness, any medication changes, and the care plan for today.

## 2018-03-07 NOTE — ED PROVIDER NOTES
History     Chief Complaint   Patient presents with     Otalgia     HPI  Andrew Lockwood is a 50 year old male with a history notable for DM2, HIV/AIDS, CNS toxoplasmosis, recent appendicitis status post appendectomy complicated by intra-abdominal abscess, and chronic abdominal pain who presents to the ED for the evaluation of left-sided otalgia. The patient reports that he has had this pain since 3/3/18, 3 days ago. He states that he was in half-way at the time, and he was told by the nurse there that the ear looked infected, and as he just got out of half-way today, he presents for evaluation. The patient reports that he was not put onto any antibiotics while in half-way. He reports that the area around his ear is painful as well, and he endorses diffuse headaches. No vomiting or recorded fevers, but the patient has been nauseated. No chest pain, SOB, or diarrhea. No focal weakness or numbness. The patient reports that his last CD4 count was 225 in mid December of 2017, but he notes that it is likely lower as he did not have access to his AIDS medications while in half-way. His sugars have been WNL recently, though. For his symptoms, the patient has been taking Tylenol. The patient offers no other concerns or complaints at this time.      PAST MEDICAL HISTORY:   Past Medical History:   Diagnosis Date     AIDS (H)      Allergic rhinitis due to other allergen     DNS     Chronic abdominal pain      CNS toxoplasmosis (H)      Diabetes type 2, controlled (H)      GERD (gastroesophageal reflux disease)      HIV (human immunodeficiency virus infection) (H)      Periungual wart      Sleep apnea     doesn't use CPAP       PAST SURGICAL HISTORY:   Past Surgical History:   Procedure Laterality Date     C NONSPECIFIC PROCEDURE      right forearm fracture     COLONOSCOPY Left 1/22/2016    Procedure: COMBINED COLONOSCOPY, SINGLE OR MULTIPLE BIOPSY/POLYPECTOMY BY BIOPSY;  Surgeon: Clark Saini MD;  Location: [facility] EXPLORE  UNDESC TESTIS,INGUIN/SCROTAL       LAPAROSCOPIC APPENDECTOMY N/A 1/31/2018    Procedure: LAPAROSCOPIC APPENDECTOMY;  LAPAROSCOPIC APPENDECTOMY;  Surgeon: Dawn Holt MD;  Location: [facility] OR     LAPAROSCOPY DIAGNOSTIC (GENERAL) N/A 7/26/2016    Procedure: LAPAROSCOPY DIAGNOSTIC (GENERAL);  Surgeon: Susannah Arriaga MD;  Location: [facility] OR     OPTICAL TRACKING SYSTEM CRANIOTOMY, EXCISE TUMOR, COMBINED Left 4/10/2015    Procedure: COMBINED OPTICAL TRACKING SYSTEM CRANIOTOMY, EXCISE TUMOR;  Surgeon: Mirlande Colmenares MD;  Location: [facility] OR     REPAIR GAMEKEEPER'S THUMB Right 12/2/2016    Procedure: REPAIR LIGAMENT ULNAR COLLATERAL THUMB (GAMEKEEPER'S);  Surgeon: Evin Zamorano MD;  Location: [facility] OR       FAMILY HISTORY:   Family History   Problem Relation Age of Onset     DIABETES Brother      DIABETES Father      Alzheimer Disease Father      Unknown/Adopted Mother      DIABETES Paternal Grandfather      CANCER No family hx of      no skin cancer     Skin Cancer No family hx of      no famiy hx of skin cancer       SOCIAL HISTORY:   Social History   Substance Use Topics     Smoking status: Current Every Day Smoker     Packs/day: 0.20     Last attempt to quit: 10/28/2016     Smokeless tobacco: Former User      Comment: 4 cigarettes     Alcohol use No      Comment: Last etoh in 2007       Patient's Medications   New Prescriptions    No medications on file   Previous Medications    ACETAMINOPHEN (TYLENOL) 325 MG TABLET    Take 2 tablets (650 mg) by mouth every 4 hours    ALUM & MAG HYDROXIDE-SIMETHICONE (MAALOX ADVANCED MAX ST) 400-400-40 MG/5ML SUSP SUSPENSION    Take 30 mLs by mouth every 4 hours as needed for indigestion    BLOOD GLUCOSE (NO BRAND SPECIFIED) LANCETS STANDARD    Use to test blood sugar four times daily or as directed.    BLOOD GLUCOSE MONITORING (NO BRAND SPECIFIED) METER DEVICE KIT    Use to test blood sugar four times daily or as directed.    BLOOD GLUCOSE MONITORING (NO BRAND  "SPECIFIED) METER DEVICE KIT    Use to test blood sugar 4 times daily or as directed.    BLOOD GLUCOSE MONITORING (NO BRAND SPECIFIED) TEST STRIP    Use to test blood sugars four times daily or as directed    BLOOD GLUCOSE MONITORING (NO BRAND SPECIFIED) TEST STRIP    1 strip by In Vitro route 4 times daily (before meals and nightly) Use to test blood sugars 4 times daily or as directed    DULAGLUTIDE (TRULICITY) 1.5 MG/0.5ML PEN    Inject 1.5 mg Subcutaneous every 7 days    GABAPENTIN (NEURONTIN) 300 MG CAPSULE    Take 1 capsule (300 mg) by mouth 3 times daily for 3 days    INSULIN PEN NEEDLE (B-D U/F) 31G X 8 MM    Use 1 pen needles daily or as directed.    SIMETHICONE (GAS-X EXTRA STRENGTH) 125 MG CHEW CHEWABLE TABLET    Take 1 tablet (125 mg) by mouth 2 times daily   Modified Medications    No medications on file   Discontinued Medications    No medications on file          Allergies   Allergen Reactions     Metformin      Abdominal pain     Milk [Lac Bovis] Hives     Tylenol [Acetaminophen] Itching         I have reviewed the Medications, Allergies, Past Medical and Surgical History, and Social History in the Epic system.    Review of Systems   Constitutional: Negative for fever.   HENT: Positive for ear pain. Negative for congestion and ear discharge.    Eyes: Negative for redness.   Respiratory: Negative for shortness of breath.    Cardiovascular: Negative for chest pain.   Gastrointestinal: Positive for nausea. Negative for abdominal pain, diarrhea and vomiting.   Genitourinary: Negative for difficulty urinating.   Musculoskeletal: Negative for arthralgias and neck stiffness.   Skin: Negative for color change.   Allergic/Immunologic: Positive for immunocompromised state.   Neurological: Positive for headaches.   Psychiatric/Behavioral: Negative for confusion.       Physical Exam   BP: (!) 137/91  Pulse: 133  Temp: 99.5  F (37.5  C)  Resp: 18  Height: 165.1 cm (5' 5\")  Weight: 76.2 kg (168 lb)  SpO2: 99 " %      Physical Exam  General: awake, alert, NAD  Head: normal cephalic  HEENT: pupils equal, conjugate gaze in tact.  Patient has poor dentition but no gingival fluctuance, no trismus, no tenderness to palpation  to the teeth along the upper or lower mandible.  Patient has a erythematous drum without pus or fluid in the canal.  No evidence of rupture.  Patient has no bulging of the ear, no redness or swelling over the mastoid but is significantly tender over his mastoid  Neck: Supple  CV: Tachycardic rate without murmur  Lungs: clear to ascultation  Abd: soft, non-tender, no guarding, no peritoneal signs  EXT: lower extremities without swelling or edema  Neuro: awake, answers questions appropriately. No focal deficits noted.  Facial nerve is intact.  ED Course     ED Course     Procedures               Labs Ordered and Resulted from Time of ED Arrival Up to the Time of Departure from the ED   BASIC METABOLIC PANEL - Abnormal; Notable for the following:        Result Value    Potassium 3.3 (*)     Glucose 114 (*)     All other components within normal limits   CRP INFLAMMATION - Abnormal; Notable for the following:     CRP Inflammation 53.0 (*)     All other components within normal limits   CBC WITH PLATELETS DIFFERENTIAL   LIPASE   NT PROBNP INPATIENT   CRP INFLAMMATION            Assessments & Plan (with Medical Decision Making)   Andrew is a 50-year-old male past medical history significant for AIDS who presents with left-sided ear pain, subjective chills, and mastoid tenderness.  Patient certainly has evidence of an acute otitis media on exam though given his mastoid tenderness I also clinical suspicion for mastoiditis.  Initial evaluation includes labs, IV fluids, pain control, and CT imaging.    Per chart review patient's last CD4 count 3 months ago was 225.  Today patient has a normal white count no left shift a mildly low potassium but otherwise normal electrolytes he does have a significantly elevated CRP  of 53.  CT scan is consistent with left otitis media with left mastoiditis.  Patient was given a dose of IV Unasyn and ENT was consulted.  ENT recommendations are currently pending.    Patient got ibuprofen, oxycodone, and 1 L of IV fluids on repeat evaluation patient was still uncomfortable mildly tachycardic a second liter of IV fluids was obtained.  He was signed out to Dr. Bruno who will follow-up on ENT recommendations and also reassess patient and make sure his tachycardia resolved.    CC Dr. Bruno's note for the remainder of the ED course and disposition.               3/6/2018   [facility], [place], EMERGENCY DEPARTMENT     Parish Gleason MD  03/07/18 0036

## 2018-03-08 ENCOUNTER — CARE COORDINATION (OUTPATIENT)
Dept: CARE COORDINATION | Facility: CLINIC | Age: 53
End: 2018-03-08

## 2018-03-08 NOTE — PROGRESS NOTES
Patient has clinic visit within 24-48 hours of Discharge so no post DC follow up call is needed          Mar 09, 2018 10:30 AM CST   (Arrive by 10:15 AM)   RETURN HAND with Evin Zamorano MD   [facility] ([facility])     [address] [place] [id number] [phone]

## 2018-03-09 LAB
HIV1 RNA # PLAS NAA DL=20: ABNORMAL {COPIES}/ML
HIV1 RNA SERPL NAA+PROBE-LOG#: 4.2 {LOG_COPIES}/ML

## 2018-03-14 ENCOUNTER — TELEPHONE (OUTPATIENT)
Dept: PHARMACY | Facility: CLINIC | Age: 53
End: 2018-03-14

## 2018-03-14 NOTE — TELEPHONE ENCOUNTER
Filled 3 weeks of medboxes. They are available for  as he needs them.    Follow up: 04/03/18 4 wks medboxes    Lenka Mabry, [facility]  [phone]

## 2018-03-15 ENCOUNTER — OFFICE VISIT (OUTPATIENT)
Dept: OPHTHALMOLOGY | Facility: CLINIC | Age: 53
End: 2018-03-15
Attending: OPHTHALMOLOGY
Payer: COMMERCIAL

## 2018-03-15 ENCOUNTER — OFFICE VISIT (OUTPATIENT)
Dept: OPHTHALMOLOGY | Facility: CLINIC | Age: 53
End: 2018-03-15
Payer: MEDICAID

## 2018-03-15 ENCOUNTER — HOSPITAL ENCOUNTER (EMERGENCY)
Facility: CLINIC | Age: 53
Discharge: HOME | End: 2018-03-15
Attending: EMERGENCY MEDICINE | Admitting: EMERGENCY MEDICINE
Payer: COMMERCIAL

## 2018-03-15 VITALS
TEMPERATURE: 99.4 F | HEIGHT: 64 IN | HEART RATE: 99 BPM | SYSTOLIC BLOOD PRESSURE: 116 MMHG | BODY MASS INDEX: 29.88 KG/M2 | RESPIRATION RATE: 18 BRPM | WEIGHT: 175 LBS | DIASTOLIC BLOOD PRESSURE: 60 MMHG | OXYGEN SATURATION: 100 %

## 2018-03-15 DIAGNOSIS — H33.312 RETINAL TEAR OF LEFT EYE: Primary | ICD-10-CM

## 2018-03-15 DIAGNOSIS — H43.12 VITREOUS HEMORRHAGE OF LEFT EYE (H): ICD-10-CM

## 2018-03-15 DIAGNOSIS — G89.18 ACUTE POST-OPERATIVE PAIN: ICD-10-CM

## 2018-03-15 DIAGNOSIS — H33.312 HORSESHOE TEAR OF RETINA OF LEFT EYE WITHOUT DETACHMENT: Primary | ICD-10-CM

## 2018-03-15 DIAGNOSIS — H43.812 POSTERIOR VITREOUS DETACHMENT OF LEFT EYE: ICD-10-CM

## 2018-03-15 PROCEDURE — 99282 EMERGENCY DEPT VISIT SF MDM: CPT | Performed by: EMERGENCY MEDICINE

## 2018-03-15 PROCEDURE — 67141 PROPH RTA DTCHMNT CRTX DTHRM: CPT | Mod: ZF | Performed by: OPHTHALMOLOGY

## 2018-03-15 PROCEDURE — 25000125 ZZHC RX 250: Mod: ZF | Performed by: OPHTHALMOLOGY

## 2018-03-15 PROCEDURE — 92250 FUNDUS PHOTOGRAPHY W/I&R: CPT | Mod: ZF | Performed by: OPHTHALMOLOGY

## 2018-03-15 PROCEDURE — 92134 CPTRZ OPH DX IMG PST SGM RTA: CPT | Mod: ZF | Performed by: OPHTHALMOLOGY

## 2018-03-15 PROCEDURE — 99282 EMERGENCY DEPT VISIT SF MDM: CPT | Mod: Z6 | Performed by: EMERGENCY MEDICINE

## 2018-03-15 RX ORDER — PROPARACAINE HYDROCHLORIDE 5 MG/ML
1 SOLUTION/ DROPS OPHTHALMIC ONCE
Status: DISCONTINUED | OUTPATIENT
Start: 2018-03-15 | End: 2018-03-15 | Stop reason: HOSPADM

## 2018-03-15 RX ORDER — OXYCODONE HYDROCHLORIDE 5 MG/1
5 TABLET ORAL ONCE
Status: DISCONTINUED | OUTPATIENT
Start: 2018-03-15 | End: 2018-03-15 | Stop reason: HOSPADM

## 2018-03-15 RX ORDER — LIDOCAINE HYDROCHLORIDE 20 MG/ML
40 INJECTION, SOLUTION INFILTRATION; PERINEURAL ONCE
Status: COMPLETED | OUTPATIENT
Start: 2018-03-15 | End: 2018-03-15

## 2018-03-15 RX ADMIN — LIDOCAINE HYDROCHLORIDE 40 ML: 20 INJECTION, SOLUTION INFILTRATION; PERINEURAL at 17:22

## 2018-03-15 RX ADMIN — NEOMYCIN SULFATE, POLYMYXIN B SULFATE, AND DEXAMETHASONE: 3.5; 10000; 1 OINTMENT OPHTHALMIC at 17:32

## 2018-03-15 ASSESSMENT — TONOMETRY
IOP_METHOD: ICARE
OD_IOP_MMHG: 21
IOP_METHOD: TONOPEN
OS_IOP_MMHG: 19
OS_IOP_MMHG: 19
OD_IOP_MMHG: 21

## 2018-03-15 ASSESSMENT — SLIT LAMP EXAM - LIDS
COMMENTS: NORMAL

## 2018-03-15 ASSESSMENT — VISUAL ACUITY
OS_SC: 20/40
METHOD: SNELLEN - LINEAR
METHOD: SNELLEN - LINEAR
OS_SC: 20/40
OD_SC: 20/25
OD_SC: 20/25

## 2018-03-15 ASSESSMENT — CONF VISUAL FIELD
OS_NORMAL: 1
OD_NORMAL: 1
OS_NORMAL: 1
OD_NORMAL: 1

## 2018-03-15 ASSESSMENT — EXTERNAL EXAM - RIGHT EYE
OD_EXAM: NORMAL
OD_EXAM: NORMAL

## 2018-03-15 ASSESSMENT — EXTERNAL EXAM - LEFT EYE
OS_EXAM: NORMAL
OS_EXAM: NORMAL

## 2018-03-15 ASSESSMENT — CUP TO DISC RATIO
OD_RATIO: 0.3
OS_RATIO: 0.3
OS_RATIO: 0.3
OD_RATIO: 0.3

## 2018-03-15 NOTE — MR AVS SNAPSHOT
After Visit Summary   3/15/2018    Andrew Lockwood    MRN: [id number]           Patient Information     Date Of Birth          11/27/1967        Visit Information        Provider Department      3/15/2018 1:00 PM Daniella Shah MD [facility]        Today's Diagnoses     Recent retinal detachment of left eye, total/subtotal    -  1    Horseshoe tear of retina of left eye without detachment           Follow-ups after your visit        Your next 10 appointments already scheduled     Mar 21, 2018 12:45 PM CDT   RETURN RETINA with Daniella Shah MD   Eye Clinic ([facility])    [address]  [address] [address]  [facility] [id number] [phone]            Apr 06, 2018  8:30 AM CDT   (Arrive by 8:15 AM)   Return Visit with Jose Ramon Singh MD   [facility] ([facility])    [address] [facility]  [address] [facility] [id number] [phone]              Who to contact     Please call your clinic at [phone] to:    Ask questions about your health    Make or cancel appointments    Discuss your medicines    Learn about your test results    Speak to your doctor            Additional Information About Your Visit        [employer] Information     [employer] gives you secure access to your electronic health record. If you see a primary care provider, you can also send messages to your care team and make appointments. If you have questions, please call your primary care clinic.  If you do not have a primary care provider, please call [phone] and they will assist you.      [employer] is an electronic gateway that provides easy, online access to your medical records. With [employer], you can request a clinic appointment, read your test results, renew a prescription or communicate with your care team.     To access your existing account, please contact your [facility] or call [phone] for  assistance.        Care EveryWhere ID     This is your Care EveryWhere ID. This could be used by other organizations to access your [facility] medical records  [id number]         Blood Pressure from Last 3 Encounters:   03/07/18 121/70   02/22/18 113/80   02/21/18 140/90    Weight from Last 3 Encounters:   03/07/18 76.2 kg (167 lb 15.9 oz)   02/22/18 76.6 kg (168 lb 12.8 oz)   02/18/18 79.8 kg (176 lb)              We Performed the Following     Cryo-Focal Retinal Laser OS (left eye)     Fundus Photos OU (both eyes)     OCT Retina Spectralis OU (both eyes)        Primary Care Provider Office Phone # Fax #    Rubio Cronin [phone]       [facility] [address] [address]        Equal Access to Services     RA JORDAN : Hadii carissa stephens Soterence, waaxda luqadaha, qaybta kaalmada axel, dez moreno . So [facility] [phone].    ATENCIÓN: Si habla español, tiene a kohli disposición servicios gratuitos de asistencia lingüística. Gurdeep al [phone].    We comply with applicable federal civil rights laws and [place] laws. We do not discriminate on the basis of race, color, national origin, age, disability, sex, sexual orientation, or gender identity.            Thank you!     Thank you for choosing EYE CLINIC  for your care. Our goal is always to provide you with excellent care. Hearing back from our patients is one way we can continue to improve our services. Please take a few minutes to complete the written survey that you may receive in the mail after your visit with us. Thank you!             Your Updated Medication List - Protect others around you: Learn how to safely use, store and throw away your medicines at www.disposemymeds.org.          This list is accurate as of 3/15/18  5:29 PM.  Always use your most recent med list.                   Brand Name Dispense Instructions for use Diagnosis    acetaminophen 325 MG tablet     TYLENOL    100 tablet    Take 2 tablets (650 mg) by mouth every 4 hours    Abdominal abscess (H)       alum & mag hydroxide-simethicone 400-400-40 MG/5ML Susp suspension    MAALOX ADVANCED MAX ST    355 mL    Take 30 mLs by mouth every 4 hours as needed for indigestion        amoxicillin-clavulanate 875-125 MG per tablet    AUGMENTIN    28 tablet    Take 1 tablet by mouth 2 times daily    OME (otitis media with effusion), left       blood glucose lancets standard    no brand specified    100 Box    Use to test blood sugar four times daily or as directed.    Type 2 diabetes mellitus without complication, without long-term current use of insulin (H)       * blood glucose monitoring meter device kit    no brand specified    1 kit    Use to test blood sugar four times daily or as directed.    Type 2 diabetes mellitus without complication, without long-term current use of insulin (H)       * blood glucose monitoring meter device kit    no brand specified    1 kit    Use to test blood sugar 4 times daily or as directed.    Type 2 diabetes mellitus with complication, without long-term current use of insulin (H)       * blood glucose monitoring test strip    no brand specified    100 each    Use to test blood sugars four times daily or as directed    Type 2 diabetes mellitus without complication, without long-term current use of insulin (H)       * blood glucose monitoring test strip    no brand specified    100 strip    1 strip by In Vitro route 4 times daily (before meals and nightly) Use to test blood sugars 4 times daily or as directed    Type 2 diabetes mellitus with complication, without long-term current use of insulin (H)       Ognoaea-Pvyme-Uyhrkryc-TenofAF 657-139-519-10 MG Tabs per tablet    GENVOYA    30 tablet    Take 1 tablet by mouth daily (with breakfast)    AIDS (acquired immune deficiency syndrome) (H)       ibuprofen 600 MG tablet    ADVIL/MOTRIN    60 tablet    Take 1 tablet (600 mg) by mouth every 8  hours as needed for moderate pain    OME (otitis media with effusion), left       insulin pen needle 31G X 8 MM    B-D U/F    100 each    Use 1 pen needles daily or as directed.    Type 2 diabetes mellitus without complication, without long-term current use of insulin (H)       simethicone 125 MG Chew chewable tablet    GAS-X EXTRA STRENGTH    120 tablet    Take 1 tablet (125 mg) by mouth 2 times daily    Gas pain       TRULICITY 1.5 MG/0.5ML pen   Generic drug:  dulaglutide      Inject 1.5 mg Subcutaneous every 7 days    Human immunodeficiency virus I infection (H), Preventative health care       * Notice:  This list has 4 medication(s) that are the same as other medications prescribed for you. Read the directions carefully, and ask your doctor or other care provider to review them with you.

## 2018-03-15 NOTE — ED NOTES
"Triage Assessment & Note:    /83  Pulse 99  Temp 99.4  F (37.4  C) (Oral)  Resp 18  Ht 1.626 m (5' 4\")  Wt 79.4 kg (175 lb)  SpO2 97%  BMI 30.04 kg/m2    Patient presents with: c/o left eye pain post surgery today.     Home Treatments/Remedies: none    Febrile / Afebrile? Afebrile    Duration of C/o: 1 day    Mick Barrientos  March 15, 2018      "

## 2018-03-15 NOTE — PROGRESS NOTES
HPI  Patient referred by Dr Rooney today for evaluation and noam of a possible Retinal detachment  Left eye temporally. patient states has been seeing floaters in his left eye x 5 days. No flashing.  Andrew Lockwood is a 50 year old male with h/o HIV/AIDS, with cerebral toxoplasmosis    POH: Presbyopia, amblyopia OS  PMH: HIV with CMV  FH: No FH of AMD or glaucoma  SH: Non-smoker    RETINAL IMAGING  Optical Coherence Tomography 3-15-18  Right eye:  Broadened foveal contour, no IRF/SRF  Left eye: Broadened foveal contour, no IRF/SRF. Vitreous debris; tiny subfoveal disruption of the IS/OS junction     Autofluorescence 6-1-16  Right eye: minimal foveal hypoAF  Left eye: minimal foveal hypoautofluorescence    PHOTOS 3-15-18  Right eye: Normal  Left eye: Pigmentation line temporally; nasal Horseshoe tear        Assessment & Plan    1. Inferior and nasal peripheral horseshoe tears  With associated hemorrhagic Posterior vitreous detachment (PVD)   No Retinal detachment    Plan for cryotherapy/laser retinopexy today  procedure performed without  complications  Inverted WWOP  Patient will follow up in one week    2. HIV with CMV  Patient does not display signs of CMV retinitis.  -Recommend yearly eye exams to monitor, sooner as needed     3. H/o amblyopia left eye  Patient does not report strabismus but his left eye has always been weaker than the right. BCVA of 20/40 today OS with normal exam.  -Monitor.    4. Presbyopia, both eyes  OK to continue with OTC readers.    Patient disposition:   Return post-op week 1  Retina detachment precautions were discussed with the patient (presence or increased in flashes, floaters or a curtain in the visual field) and was asked to return if any of the those occur  Patient was considering traveling tonight to [place]. I recommend to post-pone his trip and patient agrees      Evin Ortega MD  Ophthalmology, PGY-3      ~~~~~~~~~~~~~~~~~~~~~~~~~~~~~~~~~~   Complete documentation of  historical and exam elements from today's encounter can be found in the full encounter summary report (not reduplicated in this progress note).  I personally obtained the chief complaint(s) and history of present illness.  I confirmed and edited as necessary the review of systems, past medical/surgical history, family history, social history, and examination findings as documented by others; and I examined the patient myself.  I personally reviewed the relevant tests, images, and reports as documented above.  I personally reviewed the ophthalmic test(s) associated with this encounter, agree with the interpretation(s) as documented by the resident/fellow, and have edited the corresponding report(s) as necessary.   I formulated and edited as necessary the assessment and plan and discussed the findings and management plan with the patient and family and No resident or fellow assisted with the procedures performed.  I performed the procedures myself.    Daniella Shah MD  .  Retina Service   Department of Ophthalmology and Visual Neurosciences   [facility]  Phone: [phone]   Fax: [phone]

## 2018-03-15 NOTE — MR AVS SNAPSHOT
After Visit Summary   3/15/2018    Andrew Lockwood    MRN: [id number]           Patient Information     Date Of Birth          11/27/1967        Visit Information        Provider Department      3/15/2018 12:00 PM Madisyn Rooney OD M [facility] Ophthalmology        Today's Diagnoses     Retinal tear of left eye    -  1       Follow-ups after your visit        Your next 10 appointments already scheduled     Apr 06, 2018  8:30 AM CDT   (Arrive by 8:15 AM)   Return Visit with Jose Ramon Singh MD   [facility] ([facility])    [address] [place] [address] [phone]              Who to contact     Please call your clinic at [phone] to:    Ask questions about your health    Make or cancel appointments    Discuss your medicines    Learn about your test results    Speak to your doctor            Additional Information About Your Visit        MyChart Information     [employer] gives you secure access to your electronic health record. If you see a primary care provider, you can also send messages to your care team and make appointments. If you have questions, please call your primary care clinic.  If you do not have a primary care provider, please call [phone] and they will assist you.      [employer] is an electronic gateway that provides easy, online access to your medical records. With [employer], you can request a clinic appointment, read your test results, renew a prescription or communicate with your care team.     To access your existing account, please contact your [facility] or call [phone] for assistance.        Care EveryWhere ID     This is your Care EveryWhere ID. This could be used by other organizations to access your [place] medical records  [id number]         Blood Pressure from Last 3 Encounters:   03/07/18 121/70   02/22/18 113/80   02/21/18 140/90    Weight from Last 3 Encounters:    03/07/18 76.2 kg (167 lb 15.9 oz)   02/22/18 76.6 kg (168 lb 12.8 oz)   02/18/18 79.8 kg (176 lb)              Today, you had the following     No orders found for display       Primary Care Provider Office Phone # Fax #    Rubiolizbeth Cronin [phone]       [facility] [address]  [facility] [address]        Equal Access to Services     RA JORDAN : Hadii aad ku hadasho Soomaali, waaxda luqadaha, qaybta kaalmada adeegyada, waxay idiin hayaan adeeg kharash la'theresen ah. So [facility] [phone].    ATENCIÓN: Si habla español, tiene a kohli disposición servicios gratuitos de asistencia lingüística. [facility] [phone].    We comply with applicable federal civil rights laws and [place] laws. We do not discriminate on the basis of race, color, national origin, age, disability, sex, sexual orientation, or gender identity.            Thank you!     Thank you for choosing [facility] OPHTHALMOLOGY  for your care. Our goal is always to provide you with excellent care. Hearing back from our patients is one way we can continue to improve our services. Please take a few minutes to complete the written survey that you may receive in the mail after your visit with us. Thank you!             Your Updated Medication List - Protect others around you: Learn how to safely use, store and throw away your medicines at www.disposemymeds.org.          This list is accurate as of 3/15/18  3:02 PM.  Always use your most recent med list.                   Brand Name Dispense Instructions for use Diagnosis    acetaminophen 325 MG tablet    TYLENOL    100 tablet    Take 2 tablets (650 mg) by mouth every 4 hours    Abdominal abscess (H)       alum & mag hydroxide-simethicone 400-400-40 MG/5ML Susp suspension    MAALOX ADVANCED MAX ST    355 mL    Take 30 mLs by mouth every 4 hours as needed for indigestion        amoxicillin-clavulanate 875-125 MG per tablet    AUGMENTIN    28 tablet    Take 1 tablet by mouth 2 times  daily    OME (otitis media with effusion), left       blood glucose lancets standard    no brand specified    100 Box    Use to test blood sugar four times daily or as directed.    Type 2 diabetes mellitus without complication, without long-term current use of insulin (H)       * blood glucose monitoring meter device kit    no brand specified    1 kit    Use to test blood sugar four times daily or as directed.    Type 2 diabetes mellitus without complication, without long-term current use of insulin (H)       * blood glucose monitoring meter device kit    no brand specified    1 kit    Use to test blood sugar 4 times daily or as directed.    Type 2 diabetes mellitus with complication, without long-term current use of insulin (H)       * blood glucose monitoring test strip    no brand specified    100 each    Use to test blood sugars four times daily or as directed    Type 2 diabetes mellitus without complication, without long-term current use of insulin (H)       * blood glucose monitoring test strip    no brand specified    100 strip    1 strip by In Vitro route 4 times daily (before meals and nightly) Use to test blood sugars 4 times daily or as directed    Type 2 diabetes mellitus with complication, without long-term current use of insulin (H)       Mkmswve-Mrnqk-Jmtbobmt-TenofAF [id number] MG Tabs per tablet    GENVOYA    30 tablet    Take 1 tablet by mouth daily (with breakfast)    AIDS (acquired immune deficiency syndrome) (H)       ibuprofen 600 MG tablet    ADVIL/MOTRIN    60 tablet    Take 1 tablet (600 mg) by mouth every 8 hours as needed for moderate pain    OME (otitis media with effusion), left       insulin pen needle 31G X 8 MM    B-D U/F    100 each    Use 1 pen needles daily or as directed.    Type 2 diabetes mellitus without complication, without long-term current use of insulin (H)       simethicone 125 MG Chew chewable tablet    GAS-X EXTRA STRENGTH    120 tablet    Take 1 tablet (125 mg) by  mouth 2 times daily    Gas pain       TRULICITY 1.5 MG/0.5ML pen   Generic drug:  dulaglutide      Inject 1.5 mg Subcutaneous every 7 days    Human immunodeficiency virus I infection (H), Preventative health care       * Notice:  This list has 4 medication(s) that are the same as other medications prescribed for you. Read the directions carefully, and ask your doctor or other care provider to review them with you.

## 2018-03-15 NOTE — LETTER
3/15/2018       RE: Andrew Lockwood  [address]   [facility] [address]     Dear Madisyn,    Thank you for referring your patient, Andrew Lockwood, to the EYE CLINIC at [facility]. Please see a copy of my visit note below.    HPI  Patient referred by Dr Rooney today for evaluation and noam of a possible Retinal detachment  Left eye temporally. patient states has been seeing floaters in his left eye x 5 days. No flashing.  Andrew Lockwood is a 50 year old male with h/o HIV/AIDS, with cerebral toxoplasmosis    POH: Presbyopia, amblyopia OS  PMH: HIV with CMV  FH: No FH of AMD or glaucoma  SH: Non-smoker    RETINAL IMAGING  Optical Coherence Tomography 3-15-18  Right eye:  Broadened foveal contour, no IRF/SRF  Left eye: Broadened foveal contour, no IRF/SRF. Vitreous debris; tiny subfoveal disruption of the IS/OS junction     Autofluorescence 6-1-16  Right eye: minimal foveal hypoAF  Left eye: minimal foveal hypoautofluorescence    PHOTOS 3-15-18  Right eye: Normal  Left eye: Pigmentation line temporally; nasal Horseshoe tear        Assessment & Plan    1. Inferior and nasal peripheral horseshoe tears  With associated hemorrhagic Posterior vitreous detachment (PVD)   No Retinal detachment    Plan for cryotherapy/laser retinopexy today  procedure performed without  complications  Inverted WWOP  Patient will follow up in one week    2. HIV with CMV  Patient does not display signs of CMV retinitis.  -Recommend yearly eye exams to monitor, sooner as needed     3. H/o amblyopia left eye  Patient does not report strabismus but his left eye has always been weaker than the right. BCVA of 20/40 today OS with normal exam.  -Monitor.    4. Presbyopia, both eyes  OK to continue with OTC readers.    Patient disposition:   Return post-op week 1  Retina detachment precautions were discussed with the patient (presence or increased in flashes, floaters or a curtain in the visual field) and  was asked to return if any of the those occur  Patient was considering traveling tonight to [place]. I recommend to post-pone his trip and patient agrees      Again, thank you for allowing me to participate in the care of your patient.      Sincerely,    MD Daniella Cunningham MD  .  Retina Service   Department of Ophthalmology and Visual Neurosciences   [facility]  Phone: [phone]   Fax: [phone]

## 2018-03-15 NOTE — NURSING NOTE
"Chief Complaints and History of Present Illnesses   Patient presents with     Floaters Left Eye     HPI    Affected eye(s):  Left   Symptoms:     No blurred vision   Floaters (Comment: two black floating spots that move with vision, 2 days ago, they were very darker, better today. )   No flashes      Duration:  5 days      Do you have eye pain now?:  No      Comments:    Patient notes he feels a \"tiredness\" in the LE.    Allegrarobert Cassidy March 15, 2018 11:39 AM               "

## 2018-03-15 NOTE — PROGRESS NOTES
A/P  1.) Retinal tear OS  -Significant vitreous debris  -Pt is leaving tonight on plane to [place]  -Reviewed findings with pt and importance of treatment    Refer to retina stat    I have confirmed the patient's CC, HPI and reviewed Past Medical History, Past Surgical History, Social History, Family History, Problem List, Medication List and agree with Tech note.     Madisyn Rooney, OD FAAO

## 2018-03-16 DIAGNOSIS — R14.0 BLOATING: ICD-10-CM

## 2018-03-16 NOTE — ED PROVIDER NOTES
"  History     Chief Complaint   Patient presents with     Eye Pain     HPI  Andrew Lockwood is a 50 year old male who presents emergency department with left eye pain after having a retinal surgery today.  Patient had a retinal detachment in his left eye and had a procedure today.  He is now having some postop discomfort.  He denies any visual disturbances otherwise resting comfortably in bed in obvious distress.    I have reviewed the Medications, Allergies, Past Medical and Surgical History, and Social History in the Epic system.    Review of Systems    Physical Exam   BP: 136/83  Pulse: 99  Temp: 99.4  F (37.4  C)  Resp: 18  Height: 162.6 cm (5' 4\")  Weight: 79.4 kg (175 lb)  SpO2: 97 %      Physical Exam   Constitutional: He is oriented to person, place, and time. He appears well-developed and well-nourished. No distress.   HENT:   Head: Normocephalic and atraumatic.   Eyes: No scleral icterus.   IOP left eye 22   Neck: Normal range of motion. Neck supple.   Cardiovascular: Normal rate.    Pulmonary/Chest: Effort normal.   Neurological: He is alert and oriented to person, place, and time.   Skin: Skin is warm and dry. No rash noted. He is not diaphoretic. No erythema. No pallor.       ED Course     ED Course     Procedures             Critical Care time:  none             Labs Ordered and Resulted from Time of ED Arrival Up to the Time of Departure from the ED - No data to display         Assessments & Plan (with Medical Decision Making)   This is a 50-year-old male presenting to the emergency department with some left eye discomfort after having a retinal surgery today.  His physical exam is otherwise showing mild chemosis of the left eye.  His IOP is 22 in the left eye.  His visual acuity is stable and unchanged.  I did discuss the case with ophthalmology and they stated to just double check his IOP's and if they are normal he can go home with Tylenol.  At this time I believe he can be safely discharged in the " emergency room and can follow-up with ophthalmology for further care management.    I have reviewed the nursing notes.    I have reviewed the findings, diagnosis, plan and need for follow up with the patient.    Discharge Medication List as of 3/15/2018  8:37 PM          Final diagnoses:   Acute post-operative pain       3/15/2018   [facility], [place], EMERGENCY DEPARTMENT     Torres Saenz MD  03/16/18 0013

## 2018-03-19 RX ORDER — DICYCLOMINE HCL 20 MG
20 TABLET ORAL 2 TIMES DAILY PRN
Qty: 240 TABLET | Refills: 0 | OUTPATIENT
Start: 2018-03-19

## 2018-03-19 NOTE — TELEPHONE ENCOUNTER
dicyclomine (BENTYL) 10 MG   Last Written Prescription Date:  7/11/17  Last Fill Quantity: 240,   # refills: 0  Last Office Visit : 11/7/17  Future Office visit: NONE    Routing refill request to provider for review/approval because:  Drug not active on patient's medication list  DC'D  11/7/17   ADMIT        pt called and needs to see primary care doctor.  See my most recent note.   (Routing comment)   Left message for pt to call back.  Harleen Duke RN 8:54 AM on 3/20/2018.

## 2018-03-21 ENCOUNTER — APPOINTMENT (OUTPATIENT)
Dept: CT IMAGING | Facility: CLINIC | Age: 53
End: 2018-03-21
Attending: EMERGENCY MEDICINE
Payer: COMMERCIAL

## 2018-03-21 ENCOUNTER — OFFICE VISIT (OUTPATIENT)
Dept: AUDIOLOGY | Facility: CLINIC | Age: 53
End: 2018-03-21
Payer: MEDICAID

## 2018-03-21 ENCOUNTER — HOSPITAL ENCOUNTER (EMERGENCY)
Facility: CLINIC | Age: 53
Discharge: HOME | End: 2018-03-21
Attending: EMERGENCY MEDICINE | Admitting: EMERGENCY MEDICINE
Payer: COMMERCIAL

## 2018-03-21 ENCOUNTER — OFFICE VISIT (OUTPATIENT)
Dept: OTOLARYNGOLOGY | Facility: CLINIC | Age: 53
End: 2018-03-21

## 2018-03-21 VITALS
RESPIRATION RATE: 16 BRPM | HEART RATE: 104 BPM | DIASTOLIC BLOOD PRESSURE: 83 MMHG | BODY MASS INDEX: 29.02 KG/M2 | TEMPERATURE: 97.9 F | SYSTOLIC BLOOD PRESSURE: 131 MMHG | OXYGEN SATURATION: 97 % | WEIGHT: 170 LBS | HEIGHT: 64 IN

## 2018-03-21 DIAGNOSIS — H65.07 RECURRENT ACUTE SEROUS OTITIS MEDIA, UNSPECIFIED LATERALITY: Primary | ICD-10-CM

## 2018-03-21 DIAGNOSIS — H92.02 LEFT EAR PAIN: ICD-10-CM

## 2018-03-21 DIAGNOSIS — H90.A32 MIXED CONDUCTIVE AND SENSORINEURAL HEARING LOSS OF LEFT EAR WITH RESTRICTED HEARING OF RIGHT EAR: ICD-10-CM

## 2018-03-21 DIAGNOSIS — H90.A21 SENSORINEURAL HEARING LOSS (SNHL) OF RIGHT EAR WITH RESTRICTED HEARING OF LEFT EAR: ICD-10-CM

## 2018-03-21 DIAGNOSIS — H53.9 VISUAL DISTURBANCE: ICD-10-CM

## 2018-03-21 PROCEDURE — 99284 EMERGENCY DEPT VISIT MOD MDM: CPT | Mod: Z6 | Performed by: EMERGENCY MEDICINE

## 2018-03-21 PROCEDURE — 25000132 ZZH RX MED GY IP 250 OP 250 PS 637: Performed by: EMERGENCY MEDICINE

## 2018-03-21 PROCEDURE — 70480 CT ORBIT/EAR/FOSSA W/O DYE: CPT

## 2018-03-21 PROCEDURE — 99284 EMERGENCY DEPT VISIT MOD MDM: CPT | Mod: 25 | Performed by: EMERGENCY MEDICINE

## 2018-03-21 RX ORDER — ACETAMINOPHEN 500 MG
1000 TABLET ORAL ONCE
Status: COMPLETED | OUTPATIENT
Start: 2018-03-21 | End: 2018-03-21

## 2018-03-21 RX ADMIN — ACETAMINOPHEN 1000 MG: 500 TABLET, FILM COATED ORAL at 12:16

## 2018-03-21 ASSESSMENT — ENCOUNTER SYMPTOMS
FEVER: 0
SHORTNESS OF BREATH: 0
ABDOMINAL PAIN: 0
COUGH: 1

## 2018-03-21 NOTE — MR AVS SNAPSHOT
After Visit Summary   3/21/2018    Andrew Lockwood    MRN: [id number]           Patient Information     Date Of Birth          11/27/1967        Visit Information        Provider Department      3/21/2018 4:57 PM Karolyn Baires MD [facility]        Today's Diagnoses     Recurrent acute serous otitis media, unspecified laterality    -  1       Follow-ups after your visit        Your next 10 appointments already scheduled     Apr 06, 2018  8:30 AM CDT   (Arrive by 8:15 AM)   Return Visit with Jose Ramon Singh MD   [facility] ([facility])    [address] [place] [address]  3rd Floor  [facility] [address] [phone]            Apr 13, 2018 11:30 AM CDT   (Arrive by 11:15 AM)   RETURN HAND with Evin Zamorano MD   [facility] ([facility])    [address] [place] [address]  [address]th [facility] [address] [phone]              Who to contact     Please call your clinic at [phone] to:    Ask questions about your health    Make or cancel appointments    Discuss your medicines    Learn about your test results    Speak to your doctor            Additional Information About Your Visit        [employer] Information     [employer] gives you secure access to your electronic health record. If you see a primary care provider, you can also send messages to your care team and make appointments. If you have questions, please call your primary care clinic.  If you do not have a primary care provider, please call [phone] and they will assist you.      [employer] is an electronic gateway that provides easy, online access to your medical records. With [employer], you can request a clinic appointment, read your test results, renew a prescription or communicate with your care team.     To access your existing account, please contact your [facility] or call [phone] for assistance.         Care EveryWhere ID     This is your Care EveryWhere ID. This could be used by other organizations to access your [place] medical records  [id number]         Blood Pressure from Last 3 Encounters:   03/29/18 120/80   03/21/18 131/83   03/15/18 116/60    Weight from Last 3 Encounters:   03/29/18 77.1 kg (170 lb)   03/27/18 77.1 kg (170 lb)   03/21/18 77.1 kg (170 lb)              Today, you had the following     No orders found for display       Primary Care Provider Office Phone # Fax #    Rubio Cronin [phone]       [facility] [address]  [facility] [address]        Equal Access to Services     RA JORDAN : Apollo powero Soterence, waaxda luqadaha, qaybta kaalmada adeegyada, dez salazar. So [facility] [phone].    ATENCIÓN: Si habla español, tiene a kohli disposición servicios gratuitos de asistencia lingüística. [facility] [phone].    We comply with applicable federal civil rights laws and [place] laws. We do not discriminate on the basis of race, color, national origin, age, disability, sex, sexual orientation, or gender identity.            Thank you!     Thank you for choosing [facility]  for your care. Our goal is always to provide you with excellent care. Hearing back from our patients is one way we can continue to improve our services. Please take a few minutes to complete the written survey that you may receive in the mail after your visit with us. Thank you!             Your Updated Medication List - Protect others around you: Learn how to safely use, store and throw away your medicines at www.disposemymeds.org.          This list is accurate as of 3/21/18 11:59 PM.  Always use your most recent med list.                   Brand Name Dispense Instructions for use Diagnosis    acetaminophen 325 MG tablet    TYLENOL    100 tablet    Take 2 tablets (650 mg) by mouth every 4 hours    Abdominal abscess (H)        alum & mag hydroxide-simethicone 400-400-40 MG/5ML Susp suspension    MAALOX ADVANCED MAX ST    355 mL    Take 30 mLs by mouth every 4 hours as needed for indigestion        amoxicillin-clavulanate 875-125 MG per tablet    AUGMENTIN    28 tablet    Take 1 tablet by mouth 2 times daily    OME (otitis media with effusion), left       blood glucose lancets standard    no brand specified    100 Box    Use to test blood sugar four times daily or as directed.    Type 2 diabetes mellitus without complication, without long-term current use of insulin (H)       * blood glucose monitoring meter device kit    no brand specified    1 kit    Use to test blood sugar four times daily or as directed.    Type 2 diabetes mellitus without complication, without long-term current use of insulin (H)       * blood glucose monitoring meter device kit    no brand specified    1 kit    Use to test blood sugar 4 times daily or as directed.    Type 2 diabetes mellitus with complication, without long-term current use of insulin (H)       * blood glucose monitoring test strip    no brand specified    100 each    Use to test blood sugars four times daily or as directed    Type 2 diabetes mellitus without complication, without long-term current use of insulin (H)       * blood glucose monitoring test strip    no brand specified    100 strip    1 strip by In Vitro route 4 times daily (before meals and nightly) Use to test blood sugars 4 times daily or as directed    Type 2 diabetes mellitus with complication, without long-term current use of insulin (H)       Ppbvygo-Rsqre-Pspstggr-TenofAF 809-074-862-10 MG Tabs per tablet    GENVOYA    30 tablet    Take 1 tablet by mouth daily (with breakfast)    AIDS (acquired immune deficiency syndrome) (H)       ibuprofen 600 MG tablet    ADVIL/MOTRIN    60 tablet    Take 1 tablet (600 mg) by mouth every 8 hours as needed for moderate pain    OME (otitis media with effusion), left       insulin pen needle 31G X  8 MM    B-D U/F    100 each    Use 1 pen needles daily or as directed.    Type 2 diabetes mellitus without complication, without long-term current use of insulin (H)       simethicone 125 MG Chew chewable tablet    GAS-X EXTRA STRENGTH    120 tablet    Take 1 tablet (125 mg) by mouth 2 times daily    Gas pain       TRULICITY 1.5 MG/0.5ML pen   Generic drug:  dulaglutide      Inject 1.5 mg Subcutaneous every 7 days    Human immunodeficiency virus I infection (H), Preventative health care       * Notice:  This list has 4 medication(s) that are the same as other medications prescribed for you. Read the directions carefully, and ask your doctor or other care provider to review them with you.

## 2018-03-21 NOTE — CONSULTS
OPHTHALMOLOGY CONSULT NOTE  03/21/18    Patient: Andrew Lockwood      HISTORY OF PRESENTING ILLNESS:      Patient is being seen at the ED for ear pain. Due to pain in the ear, patient had to miss his post-op retina appointment. Vision is stable. He sees more floaters but no flashes.     CD4 count is 256    Review of systems were otherwise negative except for that which has been stated above.      OCULAR/MEDICAL/SURGICAL HISTORIES:     Past Ocular History:  Retinal Tear, OS. S/p laser and cryo 03/15/18    Past Medical History:   Diagnosis Date     AIDS (H)      Allergic rhinitis due to other allergen     DNS     Chronic abdominal pain      CNS toxoplasmosis (H)      Diabetes type 2, controlled (H)      GERD (gastroesophageal reflux disease)      HIV (human immunodeficiency virus infection) (H)      Periungual wart      Sleep apnea     doesn't use CPAP       Past Surgical History:   Procedure Laterality Date     C NONSPECIFIC PROCEDURE      right forearm fracture     COLONOSCOPY Left 1/22/2016    Procedure: COMBINED COLONOSCOPY, SINGLE OR MULTIPLE BIOPSY/POLYPECTOMY BY BIOPSY;  Surgeon: Clark Saini MD;  Location: [facility]     HC EXPLORE UNDESC TESTIS,INGUIN/SCROTAL       LAPAROSCOPIC APPENDECTOMY N/A 1/31/2018    Procedure: LAPAROSCOPIC APPENDECTOMY;  LAPAROSCOPIC APPENDECTOMY;  Surgeon: Dawn Holt MD;  Location: [facility]     LAPAROSCOPY DIAGNOSTIC (GENERAL) N/A 7/26/2016    Procedure: LAPAROSCOPY DIAGNOSTIC (GENERAL);  Surgeon: Susannah Arriaga MD;  Location:  OR     OPTICAL TRACKING SYSTEM CRANIOTOMY, EXCISE TUMOR, COMBINED Left 4/10/2015    Procedure: COMBINED OPTICAL TRACKING SYSTEM CRANIOTOMY, EXCISE TUMOR;  Surgeon: Mirlande Colmenares MD;  Location: [facility]     REPAIR GAMEKEEPER'S THUMB Right 12/2/2016    Procedure: REPAIR LIGAMENT ULNAR COLLATERAL THUMB (GAMEKEEPER'S);  Surgeon: Evin Zamorano MD;  Location:  OR       EXAMINATION:     Visual Acuity (assessed with near  card): Right Eye 20/70 PH: 20/30 ; Left Eye 20/100 PH: 20/60  Pupils: Equal and reactive to light and accomodation with no afferent pupillary defect.    Intraocular Pressure: RE  19 ; LE 19  mmHg by tonopen (<5% error).   Motility: RE Full; LE Full  Confrontational Visual Field: RE Full; LE Full     External/Slit Lamp Exam   RIGHT EYE   Lids/Lashes: No Abnormality   Conj/Sclera: White and Quiet   Cornea:  Clear   Ant Chamber:  Deep and Quiet   Iris: Round and Reactive   Lens: Clear  LEFT   Lids/lashes: No Abnormality   Conj/Sclera: injected tr    Cornea:  Clear   Ant Chamber:  Deep and Quiet   Iris: Round and Reactive   Lens:Clear    Dilated Fundus Exam  Eyes Dilated? Yes  Time: 1:35PM With Phenylephrine 2.5% and Mydriacyl 1%    (Normally, dilation with the above lasts about 4-6 hours)  RIGHT:   Anterior Vitreous: Clear   Media: Clear   Optic Nerve: Normal Contours and Size   Cup to Disc Ratio: 0.30   Macula: RPE mottling    Vessels: Normal Caliber and Distribution, (-) BDR   Retinal Periphery: attached  LEFT:   Anterior Vitreous: Clear   Media: Clear   Optic Nerve: Normal Contours and Size   Cup to Disc Ratio: 0.30   Macula: RPE mottling   Vessels: Normal Caliber and Distribution, (-) BDR   Retinal Periphery: s/p cryo/laser at 9:00, attached         ASSESSMENT/PLAN:     Assessment & Plan     1. Retinal tear, OS (03/15/18)    1 week s/p cryotherapy/laser retinopexy.    Retina attached.     Will contact patient to schedule follow up appointment with Dr. Shah.        2. HIV with CMV    Patient does not display signs of CMV retinitis.    Recommend yearly eye exams to monitor, sooner as needed.     CD4 256      3. H/o amblyopia left eye    Patient does not report strabismus but his left eye has always been weaker than the right. BCVA of 20/40 OS with normal exam.    Monitor.     4. Presbyopia, both eyes    OK to continue with OTC readers.    5. IDDM. No diabetic retinopathy [facility].     HA1C 7.5 (02/18)      Please  contact our eye clinic upon discharge to schedule your follow-up appointment.   [facility] [place] [facility], 9th Floor, [address] [place] [id number] [phone]    It is our pleasure to participate in this patient's care and treatment. Please contact us with any further questions or concerns.    Pager: [phone]    Cookie Hudson OD  Ophthalmology

## 2018-03-21 NOTE — DISCHARGE INSTRUCTIONS
Please go to the audiology clinic at the [facility], [address] [place] [address] 4th floor directly from the Emergency Department today for an audiogram.  This is for further evaluation of your hearing loss. Also, please make an appointment to follow up with the retinal specialist in the [facility] (phone: [phone]) as soon as possible for a post op exam.

## 2018-03-21 NOTE — CONSULTS
Otolaryngology Consult Note  March 21, 2018    CC: Hearing loss    HPI: Andrew Lockwood is a 50 year old male with a past medical history of HIV/AIDS, DM-2, CNS toxoplasmosis, recent appendicitis s/p appendectomy complicated by intra-abdominal abscess and chronic abdominal pain, recent retinal tear s/p cryotherapy/laser retinopexy 3/15/18 and recent left otitis media 2 weeks ago. He was seen by ENT on admission 3/6/18 and prescribed a 10 day course of Augmentin which he completed and his ear pain resolved. He now returns to the ED today with left eye pain and difficulty hearing in the left ear. He reports he had a procedure on his eye last Thursday and has had eye pain since then that is controlled with Tylenol. He had follow up scheduled with ophthalmology today. He reports he woke up on Friday morning and was unable to hear out of his left ear. He denies a prior history of hearing loss. The hearing loss has been persistent since Friday. Yesterday evening he developed room spinning dizziness that has persistent constantly since onset, but worsens with movement and standing. He does not have any associated nausea or vomiting. He reports he did not have hearing loss or dizziness with his ear infection, this developed after his ear pain had resolved. He does have a history of imbalance/ataxia/dizziness and has had workup for this in the past. He initially presented with his CNS toxoplasmosis with a complaint of dizziness in 2015.     He has been taking his HIV medications continuously since last seen by ENT. No other recent infections, no cough, nasal drainage, post-nasal drip, fevers/chills, abdominal pain, nausea, vomiting.     Past Medical History:   Diagnosis Date     AIDS (H)      Allergic rhinitis due to other allergen     DNS     Chronic abdominal pain      CNS toxoplasmosis (H)      Diabetes type 2, controlled (H)      GERD (gastroesophageal reflux disease)      HIV (human immunodeficiency virus infection) (H)       Periungual wart      Sleep apnea     doesn't use CPAP       Past Surgical History:   Procedure Laterality Date     C NONSPECIFIC PROCEDURE      right forearm fracture     COLONOSCOPY Left 1/22/2016    Procedure: COMBINED COLONOSCOPY, SINGLE OR MULTIPLE BIOPSY/POLYPECTOMY BY BIOPSY;  Surgeon: Clark Saini MD;  Location: [facility]     HC EXPLORE UNDESC TESTIS,INGUIN/SCROTAL       LAPAROSCOPIC APPENDECTOMY N/A 1/31/2018    Procedure: LAPAROSCOPIC APPENDECTOMY;  LAPAROSCOPIC APPENDECTOMY;  Surgeon: Dawn Holt MD;  Location: [facility]     LAPAROSCOPY DIAGNOSTIC (GENERAL) N/A 7/26/2016    Procedure: LAPAROSCOPY DIAGNOSTIC (GENERAL);  Surgeon: Susannah Arriaga MD;  Location: [facility]     OPTICAL TRACKING SYSTEM CRANIOTOMY, EXCISE TUMOR, COMBINED Left 4/10/2015    Procedure: COMBINED OPTICAL TRACKING SYSTEM CRANIOTOMY, EXCISE TUMOR;  Surgeon: Mirlande Colmenares MD;  Location: [facility]     REPAIR GAMEKEEPER'S THUMB Right 12/2/2016    Procedure: REPAIR LIGAMENT ULNAR COLLATERAL THUMB (GAMEKEEPER'S);  Surgeon: Evin Zamorano MD;  Location: [facility]       Current Outpatient Prescriptions   Medication Sig Dispense Refill     GENVOYA [phone] mg tablet Take 1 tablet by mouth daily (with breakfast) 30 tablet 0     amoxicillin-clavulanate (AUGMENTIN) 875-125 MG per tablet Take 1 tablet by mouth 2 times daily 28 tablet 0     ibuprofen (ADVIL/MOTRIN) 600 MG tablet Take 1 tablet (600 mg) by mouth every 8 hours as needed for moderate pain 60 tablet 0     dulaglutide (TRULICITY) 1.5 MG/0.5ML pen Inject 1.5 mg Subcutaneous every 7 days       simethicone (GAS-X EXTRA STRENGTH) 125 MG CHEW chewable tablet Take 1 tablet (125 mg) by mouth 2 times daily 120 tablet 0     insulin pen needle (B-D U/F) 31G X 8 MM Use 1 pen needles daily or as directed. 100 each 0     acetaminophen (TYLENOL) 325 MG tablet Take 2 tablets (650 mg) by mouth every 4 hours 100 tablet 1     alum & mag hydroxide-simethicone (MAALOX  "ADVANCED MAX ST) 400-400-40 MG/5ML SUSP suspension Take 30 mLs by mouth every 4 hours as needed for indigestion 355 mL 0     blood glucose monitoring (NO BRAND SPECIFIED) meter device kit Use to test blood sugar 4 times daily or as directed. 1 kit 0     blood glucose monitoring (NO BRAND SPECIFIED) test strip 1 strip by In Vitro route 4 times daily (before meals and nightly) Use to test blood sugars 4 times daily or as directed 100 strip 11     blood glucose monitoring (NO BRAND SPECIFIED) meter device kit Use to test blood sugar four times daily or as directed. 1 kit 0     blood glucose monitoring (NO BRAND SPECIFIED) test strip Use to test blood sugars four times daily or as directed 100 each 5     blood glucose (NO BRAND SPECIFIED) lancets standard Use to test blood sugar four times daily or as directed. 100 Box 5          Allergies   Allergen Reactions     Metformin      Abdominal pain       Social History     Social History     Marital status: Single     Spouse name: N/A     Number of children: N/A     Years of education: N/A     Occupational History           5 years; temp [employer]     Social History Main Topics     Smoking status: Current Every Day Smoker     Packs/day: 0.20     Last attempt to quit: 10/28/2016     Smokeless tobacco: Former User      Comment: 4 cigarettes     Alcohol use No      Comment: Last etoh in 2007     Drug use: No      Comment: \"Not very often\"     Sexual activity: Not Currently     Partners: Female, Male      Comment: Last sexual activity 2008     Other Topics Concern     Not on file     Social History Narrative    Born in [facility].  Came to the [place] in [place].  Last traveled to visit family in 2008.            Family History   Problem Relation Age of Onset     DIABETES Brother      DIABETES Father      Alzheimer Disease Father      Unknown/Adopted Mother      DIABETES Paternal Grandfather      CANCER No family hx of      no skin cancer     Skin Cancer No family hx of " "     no famiy hx of skin cancer     Glaucoma No family hx of      Macular Degeneration No family hx of        ROS: 12 point review of systems is negative unless noted in HPI.    PHYSICAL EXAM:  General: laying in bed, no acute distress  /83  Pulse 104  Temp 97.9  F (36.6  C) (Oral)  Resp 16  Ht 1.626 m (5' 4\")  Wt 77.1 kg (170 lb)  SpO2 97%  BMI 29.18 kg/m2  HEAD: normocephalic, atraumatic  Face: symmetrical, CN VII intact bilaterally (HB 1), no swelling, edema, or erythema. Sensation V1-V3 intact and equal bilaterally.   Eyes: EOMI without spontaneous or gaze evoked nystagmus, pupils dilated by ophthalmology, clear sclera  Ears: no tragal tenderness, external ear canal open and clear bilaterally, TMs clear bilaterally without effusion or perforation. No mastoid tenderness, erythema or swelling.  Tuning fork exam: Chaves lateralizes to the right; Rhinne air> bone bilaterally.   Nose: no anterior drainage  Mouth: moist, no ulcers, no jaw or tooth tenderness, tongue midline and symmetric  Oropharynx: tonsils within normal limits, uvula midline, no oropharyngeal erythema  Neck: no LAD, trachea midline, mild pain on palpation below left ear lobule  Neuro: cranial nerves 2-12 grossly intact  Respiratory: breathing non-labored on RA, no stridor    Imaging:  Results for orders placed or performed during the hospital encounter of 03/21/18   CT Temporal Orbital Sella w/o Contrast    Impression    Impression:  Interval increase in left mastoid air cell opacification,  with significant improvement in left middle ear effusion. Findings  likely represent continued mastoiditis with improving otitis media.       CT temporal bone 3/6/18:  Left temporal bone: The mastoid air cells are partially opacified. No  evidence for dehiscence. There is no debris in the external auditory  canal. The tympanic membrane is bulging. There is fluid in the left  middle ear cavity. The bony ossicles are intact and in normal  alignment. " The epitympanum contains fluid. The bony scutum is sharp.  The internal auditory canal and the labyrinthine structures are within  normal limits. The facial nerve canal appears normal along its course.    Assessment and Plan  Andrew Lockwood is a 50 year old male with a past medical history of HIV/AIDS, DM-2, CNS toxoplasmosis, recent appendicitis s/p appendectomy complicated by intra-abdominal abscess and chronic abdominal pain, recent retinal tear s/p cryotherapy/laser retinopexy 3/15/18 and recent left otitis media 2 weeks ago s/p 10 day course of Augmentin. He presents to the ED today with left sided hearing loss and dizziness. Symptoms concerning for possible labyrinthitis vs sudden sensorineural hearing loss. CT temporal bone reviewed, no acute concerning findings. Expect residual fluid in the mastoid from recent otitis media infection which can take several weeks to resolve.     - Audiogram today in ENT clinic for further workup - discharge from ED to ENT clinic   - Will need to discuss patient with ID team given HIV history and possible need for high dose steroids   - Defer further eye exam and workup to opthalmology   - Remainder of care per ED, please do not hesitate to contact ENT with questions/concerns    -- Patient and above plan discussed with ENT attending on call, Dr. Baires.     Conchita Shi PA-C  Otolaryngology-Head & Neck Surgery  Please page ENT with questions by dialing [phone] and entering job code [phone] when prompted.

## 2018-03-21 NOTE — ED PROVIDER NOTES
"  History     Chief Complaint   Patient presents with     Otalgia     Eye Pain     HPI  Andrew Lockwood is a 50 year old male with a history of DM type II, HIV/AIDS, CNS toxoplasmosis, recent appendicitis status post appendectomy complicated by intra-abdominal abscess, and chronic abdominal pain who presents to the Emergency Department for evaluation of left ear pain. The patient was hospitalized from 3/6/18-3/7/18 for IV antibiotics after presenting to the ED with acute otitis media. CT scan showed evidence of fluid/opacification of the left middle ear space with some fluid in the left mastoid air cells, but no coalescence, bony erosion, or subperiosteal abscess. He was discharged on two weeks of Augmentin. Today, he returns stating he continues to have left ear pain and difficulty hearing. He states his pain is actually posterior to the ear and he denies noticing any drainage from the ear. He says he has ran out of the antibiotics yesterday. He has also been taking Aleve twice per day for pain.    In addition, the patient underwent retinal surgery on 3/15/18 for inferior and nasal peripheral horseshoe tears with associated hemorrhagic posterior vitreous detachment. The patiented to the ED after the procedure (that same day) for post-op pain. His IOP was checked and was normal. He was discharged with Tylenol for pain and plans for optho follow-up. Today, the patient reports his eye pain has improved, but he started seeing \"fidelina lights\" in his left eye two days ago along with some blurred vision. He states it also appears that close objects are far away when looking through his left eye. He is scheduled for follow up with opthalmology today, although the patient thought that his optho appointment was tomorrow.     The patient notes he has had a mild productive cough for the past few days, but denies any other symptoms including fevers, nausea, vomiting, chest pain, or shortness of breath.    Past Medical History: "   Diagnosis Date     AIDS (H)      Allergic rhinitis due to other allergen     DNS     Chronic abdominal pain      CNS toxoplasmosis (H)      Diabetes type 2, controlled (H)      GERD (gastroesophageal reflux disease)      HIV (human immunodeficiency virus infection) (H)      Periungual wart      Sleep apnea     doesn't use CPAP       Past Surgical History:   Procedure Laterality Date     C NONSPECIFIC PROCEDURE      right forearm fracture     COLONOSCOPY Left 1/22/2016    Procedure: COMBINED COLONOSCOPY, SINGLE OR MULTIPLE BIOPSY/POLYPECTOMY BY BIOPSY;  Surgeon: Clark Saini MD;  Location: [facility]     HC EXPLORE UNDESC TESTIS,INGUIN/SCROTAL       LAPAROSCOPIC APPENDECTOMY N/A 1/31/2018    Procedure: LAPAROSCOPIC APPENDECTOMY;  LAPAROSCOPIC APPENDECTOMY;  Surgeon: Dawn Holt MD;  Location: [facility]     LAPAROSCOPY DIAGNOSTIC (GENERAL) N/A 7/26/2016    Procedure: LAPAROSCOPY DIAGNOSTIC (GENERAL);  Surgeon: Susannah Arriaga MD;  Location: [facility]     OPTICAL TRACKING SYSTEM CRANIOTOMY, EXCISE TUMOR, COMBINED Left 4/10/2015    Procedure: COMBINED OPTICAL TRACKING SYSTEM CRANIOTOMY, EXCISE TUMOR;  Surgeon: Mirlande Colmenares MD;  Location: [facility]     REPAIR GAMEKEEPER'S THUMB Right 12/2/2016    Procedure: REPAIR LIGAMENT ULNAR COLLATERAL THUMB (GAMEKEEPER'S);  Surgeon: Evin Zamorano MD;  Location: [facility]       Family History   Problem Relation Age of Onset     DIABETES Brother      DIABETES Father      Alzheimer Disease Father      Unknown/Adopted Mother      DIABETES Paternal Grandfather      CANCER No family hx of      no skin cancer     Skin Cancer No family hx of      no famiy hx of skin cancer     Glaucoma No family hx of      Macular Degeneration No family hx of        Social History   Substance Use Topics     Smoking status: Current Every Day Smoker     Packs/day: 0.20     Last attempt to quit: 10/28/2016     Smokeless tobacco: Former User      Comment: 4 cigarettes     Alcohol  "use No      Comment: Last etoh in 2007       No current facility-administered medications for this encounter.      Current Outpatient Prescriptions   Medication     GENVOYA [phone] mg tablet     amoxicillin-clavulanate (AUGMENTIN) 875-125 MG per tablet     ibuprofen (ADVIL/MOTRIN) 600 MG tablet     dulaglutide (TRULICITY) 1.5 MG/0.5ML pen     simethicone (GAS-X EXTRA STRENGTH) 125 MG CHEW chewable tablet     insulin pen needle (B-D U/F) 31G X 8 MM     acetaminophen (TYLENOL) 325 MG tablet     alum & mag hydroxide-simethicone (MAALOX ADVANCED MAX ST) 400-400-40 MG/5ML SUSP suspension     blood glucose monitoring (NO BRAND SPECIFIED) meter device kit     blood glucose monitoring (NO BRAND SPECIFIED) test strip     blood glucose monitoring (NO BRAND SPECIFIED) meter device kit     blood glucose monitoring (NO BRAND SPECIFIED) test strip     blood glucose (NO BRAND SPECIFIED) lancets standard        Allergies   Allergen Reactions     Metformin      Abdominal pain       I have reviewed the Medications, Allergies, Past Medical and Surgical History, and Social History in the Epic system.    Review of Systems   Constitutional: Negative for fever.   HENT: Positive for ear pain (left). Negative for ear discharge.    Eyes: Positive for visual disturbance.   Respiratory: Positive for cough. Negative for shortness of breath.    Cardiovascular: Negative for chest pain.   Gastrointestinal: Negative for abdominal pain.   All other systems reviewed and are negative.      Physical Exam   BP: 131/83  Pulse: 104  Temp: 97.9  F (36.6  C)  Resp: 16  Height: 162.6 cm (5' 4\")  Weight: 77.1 kg (170 lb)  SpO2: 97 %      Physical Exam    GEN:  Alert, well developed, no acute distress  HEENT:  PERRL, there is conjunctival injection of the left eye, EOMI, Mucous membranes are moist.  The left TM appears to have very minimal erythema along the periphery of the TM but otherwise appears normal.  There is tenderness posterior to the left ear " over the mastoid air cells.  Cardio:  RRR, no murmur, radial pulses equal bilaterally  PULM:  Lungs clear, good air movement, no wheezes, rales   Abd:  Soft, normal bowel sounds, no focal tenderness  Back exam:  No CVA tenderness  Musculoskeletal:  normal range of motion, no lower extremity swelling or calf tenderness  Neuro:  Alert and oriented X3, Follows commands, moving all extremities spontaneously   Skin:  Warm, dry   ED Course     ED Course     Procedures             Critical Care time:  none  Nursing attempted to get visual acuity from the patient however when he tried to read the eye chart, he said he could not see at all with the left eye.  Ophthalmology consult was requested.  CT scan of the temporal bones was done and results are shown here:  Results for orders placed or performed during the hospital encounter of 03/21/18   CT Temporal Orbital Sella w/o Contrast    Impression    Impression:  Interval increase in left mastoid air cell opacification,  with significant improvement in left middle ear effusion. Findings  likely represent continued mastoiditis with improving otitis media.        ENT consult was requested as well.  Patient was given Tylenol for pain in the ED and had significant improvement in his pain.  ENT recommended further testing with an audiogram in the audiology clinic this afternoon because of recent reports of hearing loss.  ENT did not recommend continued antibiotics at this time.  They advised that the fluid in the mastoid air cells is expected and will likely take weeks to resolve.  Ophthalmology saw the patient in the ED as well and ophthalmology suspects that the symptoms he is having is expected symptoms from the treatment for the retinal tear.  Ophthalmology recommended that he follow-up with the retinal specialist this afternoon or tomorrow.  Since ENT is recommending that the patient have an audiogram this afternoon, ophthalmology will call the patient to schedule an  appointment with the retinal specialist tomorrow.    Labs Ordered and Resulted from Time of ED Arrival Up to the Time of Departure from the ED - No data to display    Consults  ENT: Responded (03/21/18 3442)  Ophthalmology: Responded (03/21/18 2463)    Assessments & Plan (with Medical Decision Making)   Patient presents with continued left ear pain and hearing loss after an episode of otitis media and mastoiditis earlier this month.  He has finished his antibiotics and the otitis media appears to have cleared up.  He continues to have pain behind the ear and decreased hearing, so he will go straight to the audiology clinic this afternoon for an audiogram and further recommendations.    Patient also has sensation of blinking lights from the left eye.  He was treated for retinal detachment less than 2 weeks ago.  Ophthalmology believes that the continued symptoms are expected and they did not recommend any further testing or intervention today but recommended that he follow-up with the retinal specialist in clinic tomorrow.    I have reviewed the nursing notes.    I have reviewed the findings, diagnosis, plan and need for follow up with the patient.    Discharge Medication List as of 3/21/2018  3:20 PM          Final diagnoses:   Left ear pain   Visual disturbance   I, Ravi Gilliland, am serving as a trained medical scribe to document services personally performed by Sachi Mauricio MD, based on the provider's statements to me.      ISachi MD, was physically present and have reviewed and verified the accuracy of this note documented by Ravi Gilliland.       3/21/2018   [facility], [place], EMERGENCY DEPARTMENT     Carrie Mauricio MD  03/21/18 2037

## 2018-03-21 NOTE — MR AVS SNAPSHOT
After Visit Summary   3/21/2018    Andrew Lockwood    MRN: [id number]           Patient Information     Date Of Birth          11/27/1967        Visit Information        Provider Department      3/21/2018 3:00 PM Doris Miller AuD [facility] Audiology        Today's Diagnoses     Sensorineural hearing loss (SNHL) of right ear with restricted hearing of left ear        Mixed conductive and sensorineural hearing loss of left ear with restricted hearing of right ear           Follow-ups after your visit        Your next 10 appointments already scheduled     Apr 06, 2018  8:30 AM CDT   (Arrive by 8:15 AM)   Return Visit with Jose Ramon Singh MD   [facility] ([facility])    [address] [address] [phone]              Who to contact     Please call your clinic at [phone] to:    Ask questions about your health    Make or cancel appointments    Discuss your medicines    Learn about your test results    Speak to your doctor            Additional Information About Your Visit        EverestharGeoIQ Information     eCozy gives you secure access to your electronic health record. If you see a primary care provider, you can also send messages to your care team and make appointments. If you have questions, please call your primary care clinic.  If you do not have a primary care provider, please call [phone] and they will assist you.      eCozy is an electronic gateway that provides easy, online access to your medical records. With eCozy, you can request a clinic appointment, read your test results, renew a prescription or communicate with your care team.     To access your existing account, please contact your [facility] or call [phone] for assistance.        Care EveryWhere ID     This is your Care EveryWhere ID. This could be used by other organizations to access your [facility]  records  [id number]         Blood Pressure from Last 3 Encounters:   03/21/18 131/83   03/15/18 116/60   03/07/18 121/70    Weight from Last 3 Encounters:   03/21/18 77.1 kg (170 lb)   03/15/18 79.4 kg (175 lb)   03/07/18 76.2 kg (167 lb 15.9 oz)              We Performed the Following     AUDIOGRAM/TYMPANOGRAM - INTERFACE     [facility] Audiometry Thrshld Eval & Speech Recog (22820)     Tymps / Reflex   ([id number])        Primary Care Provider Office Phone # Fax #    Rubio Cronin [phone]       [address] [address]        Equal Access to Services     RA JORDAN : Hadmagnolia powero Soterence, waaxda luqadaha, qaybta kaalmada adeegyada, dez orozcoin haylinda salazar. So [facility] [phone].    ATENCIÓN: Si habla español, tiene a kohli disposición servicios gratuitos de asistencia lingüística. [facility] [phone].    We comply with applicable federal civil rights laws and [place] laws. We do not discriminate on the basis of race, color, national origin, age, disability, sex, sexual orientation, or gender identity.            Thank you!     Thank you for choosing [facility] AUDIOLOGY  for your care. Our goal is always to provide you with excellent care. Hearing back from our patients is one way we can continue to improve our services. Please take a few minutes to complete the written survey that you may receive in the mail after your visit with us. Thank you!             Your Updated Medication List - Protect others around you: Learn how to safely use, store and throw away your medicines at www.disposemymeds.org.          This list is accurate as of 3/21/18 11:59 PM.  Always use your most recent med list.                   Brand Name Dispense Instructions for use Diagnosis    acetaminophen 325 MG tablet    TYLENOL    100 tablet    Take 2 tablets (650 mg) by mouth every 4 hours    Abdominal abscess (H)       alum & mag hydroxide-simethicone  400-400-40 MG/5ML Susp suspension    MAALOX ADVANCED MAX ST    355 mL    Take 30 mLs by mouth every 4 hours as needed for indigestion        amoxicillin-clavulanate 875-125 MG per tablet    AUGMENTIN    28 tablet    Take 1 tablet by mouth 2 times daily    OME (otitis media with effusion), left       blood glucose lancets standard    no brand specified    100 Box    Use to test blood sugar four times daily or as directed.    Type 2 diabetes mellitus without complication, without long-term current use of insulin (H)       * blood glucose monitoring meter device kit    no brand specified    1 kit    Use to test blood sugar four times daily or as directed.    Type 2 diabetes mellitus without complication, without long-term current use of insulin (H)       * blood glucose monitoring meter device kit    no brand specified    1 kit    Use to test blood sugar 4 times daily or as directed.    Type 2 diabetes mellitus with complication, without long-term current use of insulin (H)       * blood glucose monitoring test strip    no brand specified    100 each    Use to test blood sugars four times daily or as directed    Type 2 diabetes mellitus without complication, without long-term current use of insulin (H)       * blood glucose monitoring test strip    no brand specified    100 strip    1 strip by In Vitro route 4 times daily (before meals and nightly) Use to test blood sugars 4 times daily or as directed    Type 2 diabetes mellitus with complication, without long-term current use of insulin (H)       Sxodytl-Oxjxx-Fjdflete-TenofAF [id number] MG Tabs per tablet    GENVOYA    30 tablet    Take 1 tablet by mouth daily (with breakfast)    AIDS (acquired immune deficiency syndrome) (H)       ibuprofen 600 MG tablet    ADVIL/MOTRIN    60 tablet    Take 1 tablet (600 mg) by mouth every 8 hours as needed for moderate pain    OME (otitis media with effusion), left       insulin pen needle 31G X 8 MM    B-D U/F    100 each     Use 1 pen needles daily or as directed.    Type 2 diabetes mellitus without complication, without long-term current use of insulin (H)       simethicone 125 MG Chew chewable tablet    GAS-X EXTRA STRENGTH    120 tablet    Take 1 tablet (125 mg) by mouth 2 times daily    Gas pain       TRULICITY 1.5 MG/0.5ML pen   Generic drug:  dulaglutide      Inject 1.5 mg Subcutaneous every 7 days    Human immunodeficiency virus I infection (H), Preventative health care       * Notice:  This list has 4 medication(s) that are the same as other medications prescribed for you. Read the directions carefully, and ask your doctor or other care provider to review them with you.

## 2018-03-21 NOTE — LETTER
3/21/2018       RE: Andrew Lockwood  [address]   [facility] [address]     Dear Colleague,    Thank you for referring your patient, Andrew Lockwood, to the [facility] EAR NOSE AND THROAT at [facility]. Please see a copy of my visit note below.    Neurotology Clinic Note  3/21/2018    Andrew Lockwood is seen today in clinic after being assessed in the ED this afternoon regarding a possible left sided sudden hearing loss.     Mr. Lockwood is a 50yoM w/ h/o HIV, DM2, CNS toxoplasmosis, and recent appendicitis s/p appendectomy complicated by intra-abdominal abscess. He was recently evaluated in the ED on 3/6/18 for left sided otitis media. CT Temporal bone at the time showed middle ear and mastoid effusion, but no concern for mastoiditis. He was treated successfully with oral antibiotics.     In the ED today, he reported a left sided hearing loss which started on Friday. Over the ensuing days, there was no subjective improvement. He endorses some left sided aural fullness/pressure. He denies autophony, but does feel as though his hearing is slightly muffled. He denies any pain or otorrhea. He also endorses some dizziness, though he has experienced this intermittently for several years. He does not believe it is related to his hearing loss. He did endorse that he recently had a URI, and is still recovering.     Regarding his HIV, his most recent CD4 count was 256, and he is currently on his HIV antiviral therapy.    Review of systems:  A complete review of systems is negative, otherwise stated in HPI.    Physical examination:  male in no acute distress.  Alert and answering questions appropriately.  HB 1/6 bilaterally.  Bilateral ears examined under the microscope. The left TM is visible and intact, slightly retracted anteriorly. Able to aerate middle ear cleft with valsalva, no evidence of middle ear effusion or infection. TM is translucent, incus visible. The right TM is visible  and intact, middle ear cleft is well aerated. TM is translucent, incus visible as well.    Audiology:  Right normal to mild SNHL, rising to normal hearing. Left asymmetric mild to moderately-severe mixed hearing loss. SRTs in agreement. Good WRS. Tymps show negative pressure bilaterally.     Assessment and plan:    Mr. Lockwood is a 50yoM w/ h/o HIV, DM2, and CMV toxoplasmosis who was recently treated for a left acute otitis, presented to the [place] ED today regarding left sided hearing loss for the past 5 days. On his initial examination, his Chaves and Rinne exams were concerning for SNHL. He was sent to audiology for an audiogram. The results showed a left sided conductive loss, with no concern for a sudden sensorineural hearing loss. On examination in clinic, he was able to aerate his left middle ear cleft via valsalva, which appeared to resolve his hearing loss. No treatment is indicated at this time, but he was encouraged to return to our clinic if he has any sudden changes in his hearing in the future.     Adri Schulte MD  Otolarynology Resident     I, Karolyn Baires, discussed this patient with the resident/fellow at the time of consultation. I have reviewed the note, labs, imaging and am in agreement with the assessment and plan. I did not see the patient.      Again, thank you for allowing me to participate in the care of your patient.      Sincerely,    Karolyn Baires MD

## 2018-03-21 NOTE — ED NOTES
Pt with left ear pain was dx'd last week with ear infection, symptoms continue. He also has left eye pain, had laser eye surgery last Wed but eye continues to hurt and sensitive to light.

## 2018-03-21 NOTE — ED AVS SNAPSHOT
[facility], [place], Emergency Department    [address] [place] [address]    Phone:  [phone]                                       Andrew Lockwood   MRN: [id number]    Department:  [facility], Emergency Department   Date of Visit:  3/21/2018           After Visit Summary Signature Page     I have received my discharge instructions, and my questions have been answered. I have discussed any challenges I see with this plan with the nurse or doctor.    ..........................................................................................................................................  Patient/Patient Representative Signature      ..........................................................................................................................................  Patient Representative Print Name and Relationship to Patient    ..................................................               ................................................  Date                                            Time    ..........................................................................................................................................  Reviewed by Signature/Title    ...................................................              ..............................................  Date                                                            Time

## 2018-03-21 NOTE — ED AVS SNAPSHOT
[facility], Emergency Department    [address] [facility] [address]    Phone:  [phone]                                       Andrew Lockwood   MRN: [id number]    Department:  [facility], Emergency Department   Date of Visit:  3/21/2018           Patient Information     Date Of Birth          11/27/1967        Your diagnoses for this visit were:     Left ear pain     Visual disturbance        You were seen by Carrie Mauricio MD.        Discharge Instructions       Please go to the audiology clinic at the [facility], [address] [place] [address] 4th floor directly from the Emergency Department today for an audiogram.  This is for further evaluation of your hearing loss. Also, please make an appointment to follow up with the retinal specialist in the Eye Clinic (phone: [phone]) as soon as possible for a post op exam.      Your next 10 appointments already scheduled     Apr 06, 2018  8:30 AM CDT   (Arrive by 8:15 AM)   Return Visit with Jose Ramon Singh MD   [facility]ermatology ([facility])    [address] [place] [address]  [address] [facility] [address] [phone]              24 Hour Appointment Hotline       To make an appointment at any [facility], call [phone]). If you don't have a family doctor or clinic, we will help you find one. [facility] clinics are conveniently located to serve the needs of you and your family.             Review of your medicines      Our records show that you are taking the medicines listed below. If these are incorrect, please call your family doctor or clinic.        Dose / Directions Last dose taken    acetaminophen 325 MG tablet   Commonly known as:  TYLENOL   Dose:  650 mg   Quantity:  100 tablet        Take 2 tablets (650 mg) by mouth every 4 hours   Refills:  1        alum & mag hydroxide-simethicone 400-400-40 MG/5ML Susp suspension   Commonly known as:  MAALOX ADVANCED MAX ST   Dose:  30 mL    Quantity:  355 mL        Take 30 mLs by mouth every 4 hours as needed for indigestion   Refills:  0        amoxicillin-clavulanate 875-125 MG per tablet   Commonly known as:  AUGMENTIN   Dose:  1 tablet   Indication:  otitis media   Quantity:  28 tablet        Take 1 tablet by mouth 2 times daily   Refills:  0        blood glucose lancets standard   Commonly known as:  no brand specified   Quantity:  100 Box        Use to test blood sugar four times daily or as directed.   Refills:  5        * blood glucose monitoring meter device kit   Commonly known as:  no brand specified   Quantity:  1 kit        Use to test blood sugar four times daily or as directed.   Refills:  0        * blood glucose monitoring meter device kit   Commonly known as:  no brand specified   Quantity:  1 kit        Use to test blood sugar 4 times daily or as directed.   Refills:  0        * blood glucose monitoring test strip   Commonly known as:  no brand specified   Quantity:  100 each        Use to test blood sugars four times daily or as directed   Refills:  5        * blood glucose monitoring test strip   Commonly known as:  no brand specified   Dose:  1 strip   Quantity:  100 strip        1 strip by In Vitro route 4 times daily (before meals and nightly) Use to test blood sugars 4 times daily or as directed   Refills:  11        Vobzdjr-Rjajf-Dpggzcjg-TenofAF [phone] MG Tabs per tablet   Commonly known as:  GENVOYA   Dose:  1 tablet   Indication:  Positive HIV Test   Quantity:  30 tablet        Take 1 tablet by mouth daily (with breakfast)   Refills:  0        ibuprofen 600 MG tablet   Commonly known as:  ADVIL/MOTRIN   Dose:  600 mg   Quantity:  60 tablet        Take 1 tablet (600 mg) by mouth every 8 hours as needed for moderate pain   Refills:  0        insulin pen needle 31G X 8 MM   Commonly known as:  B-D U/F   Quantity:  100 each        Use 1 pen needles daily or as directed.   Refills:  0        simethicone 125 MG Chew  chewable tablet   Commonly known as:  GAS-X EXTRA STRENGTH   Dose:  125 mg   Quantity:  120 tablet        Take 1 tablet (125 mg) by mouth 2 times daily   Refills:  0        TRULICITY 1.5 MG/0.5ML pen   Dose:  1.5 mg   Generic drug:  dulaglutide        Inject 1.5 mg Subcutaneous every 7 days   Refills:  0        * Notice:  This list has 4 medication(s) that are the same as other medications prescribed for you. Read the directions carefully, and ask your doctor or other care provider to review them with you.            Procedures and tests performed during your visit     CT Temporal Orbital Sella w/o Contrast      Orders Needing Specimen Collection     None      Pending Results     No orders found from 3/19/2018 to 3/22/2018.            Pending Culture Results     No orders found from 3/19/2018 to 3/22/2018.            Pending Results Instructions     If you had any lab results that were not finalized at the time of your Discharge, you can call the ED Lab Result RN at [phone]. You will be contacted by this team for any positive Lab results or changes in treatment. The nurses are available 7 days a week from 10A to 6:30P.  You can leave a message 24 hours per day and they will return your call.        Thank you for choosing [facility]       Thank you for choosing [facility] for your care. Our goal is always to provide you with excellent care. Hearing back from our patients is one way we can continue to improve our services. Please take a few minutes to complete the written survey that you may receive in the mail after you visit with us. Thank you!        LiveStubhart Information     Emotient gives you secure access to your electronic health record. If you see a primary care provider, you can also send messages to your care team and make appointments. If you have questions, please call your primary care clinic.  If you do not have a primary care provider, please call [phone] and they will assist you.        Care  EveryWhere ID     This is your Care EveryWhere ID. This could be used by other organizations to access your [facility] medical records  [id number]        Equal Access to Services     RA JORDAN : Apollo Cody, maverick centeno, luis enrique reyna, dez salazar. So [facility] [place] [phone].    ATENCIÓN: Si habla español, tiene a kohli disposición servicios gratuitos de asistencia lingüística. Llame al [phone].    We comply with applicable federal civil rights laws and [place] laws. We do not discriminate on the basis of race, color, national origin, age, disability, sex, sexual orientation, or gender identity.            After Visit Summary       This is your record. Keep this with you and show to your community pharmacist(s) and doctor(s) at your next visit.

## 2018-03-21 NOTE — PROGRESS NOTES
Neurotology Clinic Note  3/21/2018    Andrew Lockwood is seen today in clinic after being assessed in the ED this afternoon regarding a possible left sided sudden hearing loss.     Mr. Lockwood is a 50yoM w/ h/o HIV, DM2, CNS toxoplasmosis, and recent appendicitis s/p appendectomy complicated by intra-abdominal abscess. He was recently evaluated in the ED on 3/6/18 for left sided otitis media. CT Temporal bone at the time showed middle ear and mastoid effusion, but no concern for mastoiditis. He was treated successfully with oral antibiotics.     In the ED today, he reported a left sided hearing loss which started on Friday. Over the ensuing days, there was no subjective improvement. He endorses some left sided aural fullness/pressure. He denies autophony, but does feel as though his hearing is slightly muffled. He denies any pain or otorrhea. He also endorses some dizziness, though he has experienced this intermittently for several years. He does not believe it is related to his hearing loss. He did endorse that he recently had a URI, and is still recovering.     Regarding his HIV, his most recent CD4 count was 256, and he is currently on his HIV antiviral therapy.    Review of systems:  A complete review of systems is negative, otherwise stated in HPI.    Physical examination:  male in no acute distress.  Alert and answering questions appropriately.  HB 1/6 bilaterally.  Bilateral ears examined under the microscope. The left TM is visible and intact, slightly retracted anteriorly. Able to aerate middle ear cleft with valsalva, no evidence of middle ear effusion or infection. TM is translucent, incus visible. The right TM is visible and intact, middle ear cleft is well aerated. TM is translucent, incus visible as well.    Audiology:  Right normal to mild SNHL, rising to normal hearing. Left asymmetric mild to moderately-severe mixed hearing loss. SRTs in agreement. Good WRS. Tymps show negative pressure bilaterally.      Assessment and plan:    Mr. Lockwood is a 50yoM w/ h/o HIV, DM2, and CMV toxoplasmosis who was recently treated for a left acute otitis, presented to the [place] ED today regarding left sided hearing loss for the past 5 days. On his initial examination, his Chaves and Rinne exams were concerning for SNHL. He was sent to audiology for an audiogram. The results showed a left sided conductive loss, with no concern for a sudden sensorineural hearing loss. On examination in clinic, he was able to aerate his left middle ear cleft via valsalva, which appeared to resolve his hearing loss. No treatment is indicated at this time, but he was encouraged to return to our clinic if he has any sudden changes in his hearing in the future.     Adri Schulte MD  Otolarynology Resident     I, Karolyn Baires, discussed this patient with the resident/fellow at the time of consultation. I have reviewed the note, labs, imaging and am in agreement with the assessment and plan. I did not see the patient.

## 2018-03-22 NOTE — PROGRESS NOTES
AUDIOLOGY REPORT    SUMMARY: Audiology visit completed. See audiogram for results.      RECOMMENDATIONS: Follow-up with ENT.    Arabella Ramos.  Licensed Audiologist  [place] #[id number]

## 2018-03-27 ENCOUNTER — OFFICE VISIT (OUTPATIENT)
Dept: OTOLARYNGOLOGY | Facility: CLINIC | Age: 53
End: 2018-03-27
Payer: MEDICAID

## 2018-03-27 VITALS — HEIGHT: 64 IN | BODY MASS INDEX: 29.02 KG/M2 | WEIGHT: 170 LBS

## 2018-03-27 DIAGNOSIS — H66.90 ACUTE OTITIS MEDIA, UNSPECIFIED OTITIS MEDIA TYPE: Primary | ICD-10-CM

## 2018-03-27 DIAGNOSIS — H69.92 ETD (EUSTACHIAN TUBE DYSFUNCTION), LEFT: ICD-10-CM

## 2018-03-27 DIAGNOSIS — H90.12 CONDUCTIVE HEARING LOSS OF LEFT EAR, UNSPECIFIED HEARING STATUS ON CONTRALATERAL SIDE: ICD-10-CM

## 2018-03-27 RX ORDER — FLUTICASONE PROPIONATE 50 MCG
2 SPRAY, SUSPENSION (ML) NASAL
Qty: 9.9 ML | Refills: 3 | Status: SHIPPED | OUTPATIENT
Start: 2018-03-27 | End: 2018-04-26

## 2018-03-27 ASSESSMENT — PAIN SCALES - GENERAL: PAINLEVEL: MILD PAIN (2)

## 2018-03-27 NOTE — PROGRESS NOTES
Dear Rubio Galvez:    I had the pleasure of seeing Andrew Lockwood in followup today at the [facility].    HISTORY OF PRESENT ILLNESS: Patient is a 50-year-old in today for follow-up from his last visit a week ago. The left ear feels full and plugged. He hasn't underwater feeling and feels the hearing is decreased. This is been present for about 3 weeks. He was seen last week by Dr. Baires and felt to have left eustachian tube dysfunction. It seemed to be better with Valsalva and they were monitoring. On his follow-up today, he says he can't pop the ear. It is likely needs to but is unable to Valsalva. He has not had any dizziness. He denies any dysphagia, hoarseness, facial paresthesias. He smokes one half pack a day.    MEDICATIONS: Please refer to the detailed medication reconciliation performed by my nurse today, which I have reviewed and signed.     ALLERGIES:    Allergies   Allergen Reactions     Metformin      Abdominal pain       HABITS:   Alcohol use No   Comment: Last etoh in 2007    History   Smoking Status     Current Every Day Smoker     Packs/day: 0.20     Last attempt to quit: 10/28/2016   Smokeless Tobacco     Former User     Comment: 4 cigarettes         PAST MEDICAL HISTORY:  Please see today's intake form (for the remainder of the PMH) which I reviewed and signed.  Past Medical History:   Diagnosis Date     AIDS (H)      Allergic rhinitis due to other allergen     DNS     Chronic abdominal pain      CNS toxoplasmosis (H)      Diabetes type 2, controlled (H)      GERD (gastroesophageal reflux disease)      HIV (human immunodeficiency virus infection) (H)      Periungual wart      Sleep apnea     doesn't use CPAP       FAMILY HISTORY/SOCIAL HISTORY:    Family History   Problem Relation Age of Onset     DIABETES Brother      DIABETES Father      Alzheimer Disease Father      Unknown/Adopted Mother      DIABETES Paternal Grandfather      CANCER No family hx of      " no skin cancer     Skin Cancer No family hx of      no famiy hx of skin cancer     Glaucoma No family hx of      Macular Degeneration No family hx of     Social History     Social History     Marital status: Single     Spouse name: N/A     Number of children: N/A     Years of education: N/A     Occupational History           5 years; temp agency     Social History Main Topics     Smoking status: Current Every Day Smoker     Packs/day: 0.20     Last attempt to quit: 10/28/2016     Smokeless tobacco: Former User      Comment: 4 cigarettes     Alcohol use No      Comment: Last etoh in 2007     Drug use: No      Comment: \"Not very often\"     Sexual activity: Not Currently     Partners: Female, Male      Comment: Last sexual activity 2008     Other Topics Concern     Not on file     Social History Narrative    Born in [facility] [place].  [place] the [place] in [place].  Last traveled to visit family in 2008.            REVIEW OF SYSTEMS: Please see today's intake form (for the remainder of the ROS) which I have reviewed and signed.    PHYSICIAL EXAMINATION:  Constitutional: The patient was well-groomed and in no acute distress.   Skin: Warm and pink.  Psychiatric: The patient's affect was calm, cooperative, and appropriate.   Respiratory: Breathing comfortably without stridor or exertion of accessory muscles.  Eyes: Pupils were equal and reactive. Extraocular movement intact.   Head: Normocephalic and atraumatic. No lesions or scars.  Ears: Both ears examined on the microscope and right side cleaned of mild cerumen. TM looks intact and in good position. Left side was examined and again shows the TM to be intact. I see no obvious fluid but looks like there may be mild retraction. He is unable to Valsalva today.  Nose: Sinuses were nontender. Anterior rhinoscopy revealed midline septum and absence of purulence or polyps.  Oral Cavity: Normal tongue, floor of moth, buccal mucosa, and palate. No abnormal lymph tissue " in the oropharynx.   Neck: The parotid is soft without masses. Supple with normal laryngeal and tracheal landmarks.   Lymphatic: There is no palpable lymphadenopathy or other masses in the neck.   Neurologic: Alert and oriented x 3. Cranial nerves III-XI within normal limits. Voice quality normal.  Cerebellar Function Tests:  Grossly normal    Audiogram:  Audiogram performed last week shows a mild left conductive hearing loss. Looks like he is showing some negative pressure in both ears but movement. Tuning forks shows a Chaves lateralizing to the left side in agreement with conductive loss.    IMPRESSION AND PLAN: Discussed with him a trial of Augmentin 875 b.i.d. for 10 days and Flonase on a regular basis with frequent Valsalva was to see if we can get the eustachian tube opening. Discussed the possibility of tube placement if it doesn't clear. He will trial this and return in 1 month if symptoms persist.    Thank you very much for the opportunity to participate in the care of your patient.    Rick L Nissen MD

## 2018-03-27 NOTE — MR AVS SNAPSHOT
After Visit Summary   3/27/2018    Andrew Lockwood    MRN: [id number]           Patient Information     Date Of Birth          11/27/1967        Visit Information        Provider Department      3/27/2018 3:00 PM Nissen, Rick L, MD [facility]        Today's Diagnoses     Acute otitis media, unspecified otitis media type    -  1    ETD (Eustachian tube dysfunction), left        Conductive hearing loss of left ear, unspecified hearing status on contralateral side           Follow-ups after your visit        Your next 10 appointments already scheduled     Apr 06, 2018  8:30 AM CDT   (Arrive by 8:15 AM)   Return Visit with Jose Ramon Singh MD   [facility] ([facility] [place])    [address]  3rd [address]  [facility] [address] [phone]            Apr 13, 2018 11:30 AM CDT   (Arrive by 11:15 AM)   RETURN HAND with Evin Zamorano MD   [facility] ([facility] [place])    [address]  4th [address]  [facility] [address] [phone]              Who to contact     Please call your clinic at [phone] to:    Ask questions about your health    Make or cancel appointments    Discuss your medicines    Learn about your test results    Speak to your doctor            Additional Information About Your Visit        [employer] Information     [employer] gives you secure access to your electronic health record. If you see a primary care provider, you can also send messages to your care team and make appointments. If you have questions, please call your primary care clinic.  If you do not have a primary care provider, please call [phone] and they will assist you.      [employer] is an electronic gateway that provides easy, online access to your medical records. With [employer], you can request a clinic appointment, read your test results, renew a prescription or communicate with your care team.     To access your  "existing account, please contact your [facility] or call [phone] for assistance.        Care EveryWhere ID     This is your Care EveryWhere ID. This could be used by other organizations to access your [facility] medical records  [id number]        Your Vitals Were     Height BMI (Body Mass Index)                1.626 m (5' 4.02\") 29.17 kg/m2           Blood Pressure from Last 3 Encounters:   03/21/18 131/83   03/15/18 116/60   03/07/18 121/70    Weight from Last 3 Encounters:   03/27/18 77.1 kg (170 lb)   03/21/18 77.1 kg (170 lb)   03/15/18 79.4 kg (175 lb)              We Performed the Following     BINOCULAR MICROSCOPY          Today's Medication Changes          These changes are accurate as of 3/27/18  5:43 PM.  If you have any questions, ask your nurse or doctor.               Start taking these medicines.        Dose/Directions    fluticasone 50 MCG/ACT spray   Commonly known as:  FLONASE   Used for:  Acute otitis media, unspecified otitis media type   Started by:  Nissen, Rick L, MD        Dose:  2 spray   Spray 2 sprays into both nostrils 2 times daily   Quantity:  9.9 mL   Refills:  3         These medicines have changed or have updated prescriptions.        Dose/Directions    * amoxicillin-clavulanate 875-125 MG per tablet   Commonly known as:  AUGMENTIN   Indication:  otitis media   This may have changed:  Another medication with the same name was added. Make sure you understand how and when to take each.   Used for:  OME (otitis media with effusion), left   Changed by:  Nissen, Rick L, MD        Dose:  1 tablet   Take 1 tablet by mouth 2 times daily   Quantity:  28 tablet   Refills:  0       * amoxicillin-clavulanate 875-125 MG per tablet   Commonly known as:  AUGMENTIN   This may have changed:  You were already taking a medication with the same name, and this prescription was added. Make sure you understand how and when to take each.   Used for:  Acute otitis media, " unspecified otitis media type   Changed by:  Nissen, Rick L, MD        Take 1 tab PO BID x 10 days   Quantity:  20 tablet   Refills:  0       * Notice:  This list has 2 medication(s) that are the same as other medications prescribed for you. Read the directions carefully, and ask your doctor or other care provider to review them with you.         Where to get your medicines      These medications were sent to [facility] [place], [place] - [address]  [address], [facility] [address]    Hours:  TRANSPLANT PHONE NUMBER [phone] Phone:  [phone]     amoxicillin-clavulanate 875-125 MG per tablet    fluticasone 50 MCG/ACT spray                Primary Care Provider Office Phone # Fax #    Rubio Cronin [phone]       [facility] [address]  [facility] [address]        Equal Access to Services     RA JORDAN AH: Hadii aad ku hadasho Soomaali, waaxda luqadaha, qaybta kaalmada adeegyada, waxay idiin hayaan adeecho salazar. So [facility] [phone].    ATENCIÓN: Si habla español, tiene a kohli disposición servicios gratuitos de asistencia lingüística. Gurdeep al [phone].    We comply with applicable federal civil rights laws and [place] laws. We do not discriminate on the basis of race, color, national origin, age, disability, sex, sexual orientation, or gender identity.            Thank you!     Thank you for choosing [facility]  for your care. Our goal is always to provide you with excellent care. Hearing back from our patients is one way we can continue to improve our services. Please take a few minutes to complete the written survey that you may receive in the mail after your visit with us. Thank you!             Your Updated Medication List - Protect others around you: Learn how to safely use, store and throw away your medicines at www.disposemymeds.org.          This list is accurate as of  3/27/18  5:43 PM.  Always use your most recent med list.                   Brand Name Dispense Instructions for use Diagnosis    acetaminophen 325 MG tablet    TYLENOL    100 tablet    Take 2 tablets (650 mg) by mouth every 4 hours    Abdominal abscess (H)       alum & mag hydroxide-simethicone 400-400-40 MG/5ML Susp suspension    MAALOX ADVANCED MAX ST    355 mL    Take 30 mLs by mouth every 4 hours as needed for indigestion        * amoxicillin-clavulanate 875-125 MG per tablet    AUGMENTIN    28 tablet    Take 1 tablet by mouth 2 times daily    OME (otitis media with effusion), left       * amoxicillin-clavulanate 875-125 MG per tablet    AUGMENTIN    20 tablet    Take 1 tab PO BID x 10 days    Acute otitis media, unspecified otitis media type       blood glucose lancets standard    no brand specified    100 Box    Use to test blood sugar four times daily or as directed.    Type 2 diabetes mellitus without complication, without long-term current use of insulin (H)       * blood glucose monitoring meter device kit    no brand specified    1 kit    Use to test blood sugar four times daily or as directed.    Type 2 diabetes mellitus without complication, without long-term current use of insulin (H)       * blood glucose monitoring meter device kit    no brand specified    1 kit    Use to test blood sugar 4 times daily or as directed.    Type 2 diabetes mellitus with complication, without long-term current use of insulin (H)       * blood glucose monitoring test strip    no brand specified    100 each    Use to test blood sugars four times daily or as directed    Type 2 diabetes mellitus without complication, without long-term current use of insulin (H)       * blood glucose monitoring test strip    no brand specified    100 strip    1 strip by In Vitro route 4 times daily (before meals and nightly) Use to test blood sugars 4 times daily or as directed    Type 2 diabetes mellitus with complication, without long-term  current use of insulin (H)       Bzjbeqb-Wwlud-Vxbtlxtm-TenofAF 510-216-603-10 MG Tabs per tablet    GENVOYA    30 tablet    Take 1 tablet by mouth daily (with breakfast)    AIDS (acquired immune deficiency syndrome) (H)       fluticasone 50 MCG/ACT spray    FLONASE    9.9 mL    Spray 2 sprays into both nostrils 2 times daily    Acute otitis media, unspecified otitis media type       ibuprofen 600 MG tablet    ADVIL/MOTRIN    60 tablet    Take 1 tablet (600 mg) by mouth every 8 hours as needed for moderate pain    OME (otitis media with effusion), left       insulin pen needle 31G X 8 MM    B-D U/F    100 each    Use 1 pen needles daily or as directed.    Type 2 diabetes mellitus without complication, without long-term current use of insulin (H)       simethicone 125 MG Chew chewable tablet    GAS-X EXTRA STRENGTH    120 tablet    Take 1 tablet (125 mg) by mouth 2 times daily    Gas pain       TRULICITY 1.5 MG/0.5ML pen   Generic drug:  dulaglutide      Inject 1.5 mg Subcutaneous every 7 days    Human immunodeficiency virus I infection (H), Preventative health care       * Notice:  This list has 6 medication(s) that are the same as other medications prescribed for you. Read the directions carefully, and ask your doctor or other care provider to review them with you.

## 2018-03-27 NOTE — LETTER
3/27/2018       RE: Andrew Lockwood  [address]   [facility] [address]     Dear Colleague,    Thank you for referring your patient, Andrew Lockwood, to the [facility] at [facility]. Please see a copy of my visit note below.    Dear Rubio Galvez:    I had the pleasure of seeing Andrew Lockwood in followup today at the [facility].    HISTORY OF PRESENT ILLNESS: Patient is a 50-year-old in today for follow-up from his last visit a week ago. The left ear feels full and plugged. He hasn't underwater feeling and feels the hearing is decreased. This is been present for about 3 weeks. He was seen last week by Dr. Baires and felt to have left eustachian tube dysfunction. It seemed to be better with Valsalva and they were monitoring. On his follow-up today, he says he can't pop the ear. It is likely needs to but is unable to Valsalva. He has not had any dizziness. He denies any dysphagia, hoarseness, facial paresthesias. He smokes one half pack a day.    MEDICATIONS: Please refer to the detailed medication reconciliation performed by my nurse today, which I have reviewed and signed.     ALLERGIES:    Allergies   Allergen Reactions     Metformin      Abdominal pain       HABITS:   Alcohol use No   Comment: Last etoh in 2007    History   Smoking Status     Current Every Day Smoker     Packs/day: 0.20     Last attempt to quit: 10/28/2016   Smokeless Tobacco     Former User     Comment: 4 cigarettes         PAST MEDICAL HISTORY:  Please see today's intake form (for the remainder of the PMH) which I reviewed and signed.  Past Medical History:   Diagnosis Date     AIDS (H)      Allergic rhinitis due to other allergen     DNS     Chronic abdominal pain      CNS toxoplasmosis (H)      Diabetes type 2, controlled (H)      GERD (gastroesophageal reflux disease)      HIV (human immunodeficiency virus infection) (H)      Periungual wart  "     Sleep apnea     doesn't use CPAP       FAMILY HISTORY/SOCIAL HISTORY:    Family History   Problem Relation Age of Onset     DIABETES Brother      DIABETES Father      Alzheimer Disease Father      Unknown/Adopted Mother      DIABETES Paternal Grandfather      CANCER No family hx of      no skin cancer     Skin Cancer No family hx of      no famiy hx of skin cancer     Glaucoma No family hx of      Macular Degeneration No family hx of     Social History     Social History     Marital status: Single     Spouse name: N/A     Number of children: N/A     Years of education: N/A     Occupational History           5 years; temp agency     Social History Main Topics     Smoking status: Current Every Day Smoker     Packs/day: 0.20     Last attempt to quit: 10/28/2016     Smokeless tobacco: Former User      Comment: 4 cigarettes     Alcohol use No      Comment: Last etoh in 2007     Drug use: No      Comment: \"Not very often\"     Sexual activity: Not Currently     Partners: Female, Male      Comment: Last sexual activity 2008     Other Topics Concern     Not on file     Social History Narrative    Born in [place] [facility].  [place]  Last traveled to visit family in 2008.            REVIEW OF SYSTEMS: Please see today's intake form (for the remainder of the ROS) which I have reviewed and signed.    PHYSICIAL EXAMINATION:  Constitutional: The patient was well-groomed and in no acute distress.   Skin: Warm and pink.  Psychiatric: The patient's affect was calm, cooperative, and appropriate.   Respiratory: Breathing comfortably without stridor or exertion of accessory muscles.  Eyes: Pupils were equal and reactive. Extraocular movement intact.   Head: Normocephalic and atraumatic. No lesions or scars.  Ears: Both ears examined on the microscope and right side cleaned of mild cerumen. TM looks intact and in good position. Left side was examined and again shows the TM to be intact. I see no obvious " fluid but looks like there may be mild retraction. He is unable to Valsalva today.  Nose: Sinuses were nontender. Anterior rhinoscopy revealed midline septum and absence of purulence or polyps.  Oral Cavity: Normal tongue, floor of moth, buccal mucosa, and palate. No abnormal lymph tissue in the oropharynx.   Neck: The parotid is soft without masses. Supple with normal laryngeal and tracheal landmarks.   Lymphatic: There is no palpable lymphadenopathy or other masses in the neck.   Neurologic: Alert and oriented x 3. Cranial nerves III-XI within normal limits. Voice quality normal.  Cerebellar Function Tests:  Grossly normal    Audiogram:  Audiogram performed last week shows a mild left conductive hearing loss. Looks like he is showing some negative pressure in both ears but movement. Tuning forks shows a Chaves lateralizing to the left side in agreement with conductive loss.    IMPRESSION AND PLAN: Discussed with him a trial of Augmentin 875 b.i.d. for 10 days and Flonase on a regular basis with frequent Valsalva was to see if we can get the eustachian tube opening. Discussed the possibility of tube placement if it doesn't clear. He will trial this and return in 1 month if symptoms persist.    Thank you very much for the opportunity to participate in the care of your patient.    Rick L Nissen MD

## 2018-03-27 NOTE — NURSING NOTE
Chief Complaint   Patient presents with     Consult     ear infection     \Yeny Cole Medical Assistant

## 2018-03-29 ENCOUNTER — HOSPITAL ENCOUNTER (EMERGENCY)
Facility: CLINIC | Age: 53
Discharge: HOME | End: 2018-03-29
Attending: EMERGENCY MEDICINE | Admitting: EMERGENCY MEDICINE
Payer: COMMERCIAL

## 2018-03-29 VITALS
OXYGEN SATURATION: 100 % | HEART RATE: 100 BPM | HEIGHT: 64 IN | WEIGHT: 170 LBS | TEMPERATURE: 97.9 F | RESPIRATION RATE: 18 BRPM | DIASTOLIC BLOOD PRESSURE: 80 MMHG | BODY MASS INDEX: 29.02 KG/M2 | SYSTOLIC BLOOD PRESSURE: 120 MMHG

## 2018-03-29 DIAGNOSIS — R19.7 DIARRHEA, UNSPECIFIED TYPE: ICD-10-CM

## 2018-03-29 DIAGNOSIS — R10.84 ABDOMINAL PAIN, GENERALIZED: ICD-10-CM

## 2018-03-29 LAB
ANION GAP SERPL CALCULATED.3IONS-SCNC: 11 MMOL/L (ref 3–14)
BASOPHILS # BLD AUTO: 0 10E9/L (ref 0–0.2)
BASOPHILS NFR BLD AUTO: 0.5 %
BUN SERPL-MCNC: 13 MG/DL (ref 7–30)
C COLI+JEJUNI+LARI FUSA STL QL NAA+PROBE: NOT DETECTED
C DIFF TOX B STL QL: NEGATIVE
CALCIUM SERPL-MCNC: 9.8 MG/DL (ref 8.5–10.1)
CHLORIDE SERPL-SCNC: 106 MMOL/L (ref 94–109)
CO2 SERPL-SCNC: 23 MMOL/L (ref 20–32)
CREAT SERPL-MCNC: 0.81 MG/DL (ref 0.66–1.25)
DIFFERENTIAL METHOD BLD: NORMAL
EC STX1 GENE STL QL NAA+PROBE: NOT DETECTED
EC STX2 GENE STL QL NAA+PROBE: NOT DETECTED
ENTERIC PATHOGEN COMMENT: NORMAL
EOSINOPHIL # BLD AUTO: 0.3 10E9/L (ref 0–0.7)
EOSINOPHIL NFR BLD AUTO: 3.3 %
ERYTHROCYTE [DISTWIDTH] IN BLOOD BY AUTOMATED COUNT: 13.7 % (ref 10–15)
GFR SERPL CREATININE-BSD FRML MDRD: >90 ML/MIN/1.7M2
GLUCOSE SERPL-MCNC: 124 MG/DL (ref 70–99)
HCT VFR BLD AUTO: 42.9 % (ref 40–53)
HGB BLD-MCNC: 14.4 G/DL (ref 13.3–17.7)
IMM GRANULOCYTES # BLD: 0.1 10E9/L (ref 0–0.4)
IMM GRANULOCYTES NFR BLD: 0.8 %
LYMPHOCYTES # BLD AUTO: 3.2 10E9/L (ref 0.8–5.3)
LYMPHOCYTES NFR BLD AUTO: 37.8 %
MCH RBC QN AUTO: 31.4 PG (ref 26.5–33)
MCHC RBC AUTO-ENTMCNC: 33.6 G/DL (ref 31.5–36.5)
MCV RBC AUTO: 94 FL (ref 78–100)
MONOCYTES # BLD AUTO: 0.7 10E9/L (ref 0–1.3)
MONOCYTES NFR BLD AUTO: 8.3 %
NEUTROPHILS # BLD AUTO: 4.2 10E9/L (ref 1.6–8.3)
NEUTROPHILS NFR BLD AUTO: 49.3 %
NOROV GI+II ORF1-ORF2 JNC STL QL NAA+PR: NOT DETECTED
NRBC # BLD AUTO: 0 10*3/UL
NRBC BLD AUTO-RTO: 0 /100
PLATELET # BLD AUTO: 268 10E9/L (ref 150–450)
POTASSIUM SERPL-SCNC: 3.3 MMOL/L (ref 3.4–5.3)
RBC # BLD AUTO: 4.59 10E12/L (ref 4.4–5.9)
RVA NSP5 STL QL NAA+PROBE: NOT DETECTED
SALMONELLA SP RPOD STL QL NAA+PROBE: NOT DETECTED
SHIGELLA SP+EIEC IPAH STL QL NAA+PROBE: NOT DETECTED
SODIUM SERPL-SCNC: 140 MMOL/L (ref 133–144)
SPECIMEN SOURCE: NORMAL
V CHOL+PARA RFBL+TRKH+TNAA STL QL NAA+PR: NOT DETECTED
WBC # BLD AUTO: 8.5 10E9/L (ref 4–11)
Y ENTERO RECN STL QL NAA+PROBE: NOT DETECTED

## 2018-03-29 PROCEDURE — 80048 BASIC METABOLIC PNL TOTAL CA: CPT | Performed by: EMERGENCY MEDICINE

## 2018-03-29 PROCEDURE — 99283 EMERGENCY DEPT VISIT LOW MDM: CPT | Mod: Z6 | Performed by: EMERGENCY MEDICINE

## 2018-03-29 PROCEDURE — 87506 IADNA-DNA/RNA PROBE TQ 6-11: CPT | Performed by: EMERGENCY MEDICINE

## 2018-03-29 PROCEDURE — 87493 C DIFF AMPLIFIED PROBE: CPT | Performed by: EMERGENCY MEDICINE

## 2018-03-29 PROCEDURE — 85025 COMPLETE CBC W/AUTO DIFF WBC: CPT | Performed by: EMERGENCY MEDICINE

## 2018-03-29 PROCEDURE — 99284 EMERGENCY DEPT VISIT MOD MDM: CPT | Performed by: EMERGENCY MEDICINE

## 2018-03-29 ASSESSMENT — ENCOUNTER SYMPTOMS
DIZZINESS: 1
SHORTNESS OF BREATH: 0
FEVER: 0
APPETITE CHANGE: 0
CHILLS: 0
NAUSEA: 1
COUGH: 0
ABDOMINAL PAIN: 1
VOMITING: 0
DIARRHEA: 1

## 2018-03-29 NOTE — DISCHARGE INSTRUCTIONS
Thank you for your patience today.  Please follow-up with your regular doctor and infectious disease doctor in the next 1-2 days for further evaluation and follow-up care.  Please call to schedule an appointment.  Please continue your own medications.  Please drink plenty of fluids including water and Gatorade.  Please follow-up brat diet bananas, rice, applesauce, toast.  Please return to the ER if you develop high fever, severe pain, persistent vomiting and diarrhea, dehydration with inability to eat or drink, or any worsening of your current symptoms.  It was a pleasure taking care of you today.  We hope you feel better soon.

## 2018-03-29 NOTE — ED AVS SNAPSHOT
[facility], Emergency Department    [address] [facility] [address]    Phone:  [phone]                                       Andrew Lockwood   MRN: [id number]    Department:  [facility], Emergency Department   Date of Visit:  3/29/2018           Patient Information     Date Of Birth          11/27/1967        Your diagnoses for this visit were:     Diarrhea, unspecified type     Abdominal pain, generalized        You were seen by Sheila Bruno MD.        Discharge Instructions       Thank you for your patience today.  Please follow-up with your regular doctor in the next 2-3 days for further evaluation and follow-up care.  Please call to schedule an appointment.  Please continue your own medications.  Please drink plenty of fluids including water and Gatorade.  Please follow-up brat diet bananas, rice, applesauce, toast.  Please return to the ER if you develop high fever, severe pain, persistent vomiting and diarrhea, or any worsening of your current symptoms.  It was a pleasure taking care of you today.  We hope you feel better soon.      Your next 10 appointments already scheduled     Apr 06, 2018  8:30 AM CDT   (Arrive by 8:15 AM)   Return Visit with Jose Ramon Singh MD   [facility] ([facility] [place])    [address]  [address] [facility] [address] [phone]            Apr 13, 2018 11:30 AM CDT   (Arrive by 11:15 AM)   RETURN HAND with Evin Zamorano MD   [facility] ([facility] [place])    [address] [place] [address]  [address] [facility] [address] [phone]              24 Hour Appointment Hotline       To make an appointment at any [facility], call [phone] ([phone]). If you don't have a family doctor or clinic, we will help you find one. [facility] clinics are conveniently located to serve the needs of you and your family.             Review of your medicines      Our records  show that you are taking the medicines listed below. If these are incorrect, please call your family doctor or clinic.        Dose / Directions Last dose taken    acetaminophen 325 MG tablet   Commonly known as:  TYLENOL   Dose:  650 mg   Quantity:  100 tablet        Take 2 tablets (650 mg) by mouth every 4 hours   Refills:  1        alum & mag hydroxide-simethicone 400-400-40 MG/5ML Susp suspension   Commonly known as:  MAALOX ADVANCED MAX ST   Dose:  30 mL   Quantity:  355 mL        Take 30 mLs by mouth every 4 hours as needed for indigestion   Refills:  0        * amoxicillin-clavulanate 875-125 MG per tablet   Commonly known as:  AUGMENTIN   Dose:  1 tablet   Indication:  otitis media   Quantity:  28 tablet        Take 1 tablet by mouth 2 times daily   Refills:  0        * amoxicillin-clavulanate 875-125 MG per tablet   Commonly known as:  AUGMENTIN   Quantity:  20 tablet        Take 1 tab PO BID x 10 days   Refills:  0        blood glucose lancets standard   Commonly known as:  no brand specified   Quantity:  100 Box        Use to test blood sugar four times daily or as directed.   Refills:  5        * blood glucose monitoring meter device kit   Commonly known as:  no brand specified   Quantity:  1 kit        Use to test blood sugar four times daily or as directed.   Refills:  0        * blood glucose monitoring meter device kit   Commonly known as:  no brand specified   Quantity:  1 kit        Use to test blood sugar 4 times daily or as directed.   Refills:  0        * blood glucose monitoring test strip   Commonly known as:  no brand specified   Quantity:  100 each        Use to test blood sugars four times daily or as directed   Refills:  5        * blood glucose monitoring test strip   Commonly known as:  no brand specified   Dose:  1 strip   Quantity:  100 strip        1 strip by In Vitro route 4 times daily (before meals and nightly) Use to test blood sugars 4 times daily or as directed   Refills:  11         Lqcicfp-Oeuvw-Cjifwzvj-TenofAF [phone] MG Tabs per tablet   Commonly known as:  GENVOYA   Dose:  1 tablet   Indication:  Positive HIV Test   Quantity:  30 tablet        Take 1 tablet by mouth daily (with breakfast)   Refills:  0        fluticasone 50 MCG/ACT spray   Commonly known as:  FLONASE   Dose:  2 spray   Quantity:  9.9 mL        Spray 2 sprays into both nostrils 2 times daily   Refills:  3        ibuprofen 600 MG tablet   Commonly known as:  ADVIL/MOTRIN   Dose:  600 mg   Quantity:  60 tablet        Take 1 tablet (600 mg) by mouth every 8 hours as needed for moderate pain   Refills:  0        insulin pen needle 31G X 8 MM   Commonly known as:  B-D U/F   Quantity:  100 each        Use 1 pen needles daily or as directed.   Refills:  0        simethicone 125 MG Chew chewable tablet   Commonly known as:  GAS-X EXTRA STRENGTH   Dose:  125 mg   Quantity:  120 tablet        Take 1 tablet (125 mg) by mouth 2 times daily   Refills:  0        TRULICITY 1.5 MG/0.5ML pen   Dose:  1.5 mg   Generic drug:  dulaglutide        Inject 1.5 mg Subcutaneous every 7 days   Refills:  0        * Notice:  This list has 6 medication(s) that are the same as other medications prescribed for you. Read the directions carefully, and ask your doctor or other care provider to review them with you.            Procedures and tests performed during your visit     Basic metabolic panel    CBC with platelets differential    Clostridium difficile toxin B PCR    Enteric Bacteria and Virus Panel by SUZETTE Stool      Orders Needing Specimen Collection     None      Pending Results     Date and Time Order Name Status Description    3/29/2018 0150 Enteric Bacteria and Virus Panel by SUZETTE Stool In process             Pending Culture Results     Date and Time Order Name Status Description    3/29/2018 0150 Enteric Bacteria and Virus Panel by SUZETTE Stool In process             Pending Results Instructions     If you had any lab results that were not  finalized at the time of your Discharge, you can call the ED Lab Result RN at [phone]. You will be contacted by this team for any positive Lab results or changes in treatment. The nurses are available 7 days a week from 10A to 6:30P.  You can leave a message 24 hours per day and they will return your call.        Thank you for choosing [facility]       Thank you for choosing [facility] for your care. Our goal is always to provide you with excellent care. Hearing back from our patients is one way we can continue to improve our services. Please take a few minutes to complete the written survey that you may receive in the mail after you visit with us. Thank you!        [employer] Information     [employer] gives you secure access to your electronic health record. If you see a primary care provider, you can also send messages to your care team and make appointments. If you have questions, please call your primary care clinic.  If you do not have a primary care provider, please call [phone] and they will assist you.        Care EveryWhere ID     This is your Care EveryWhere ID. This could be used by other organizations to access your [facility] medical records  [id number]        Equal Access to Services     RA JORDAN : Apollo Cody, maverick centeno, dez jackson. So [facility] [phone].    ATENCIÓN: Si habla español, tiene a kohli disposición servicios gratuitos de asistencia lingüística. Gurdeep al [phone].    We comply with applicable federal civil rights laws and [place] laws. We do not discriminate on the basis of race, color, national origin, age, disability, sex, sexual orientation, or gender identity.            After Visit Summary       This is your record. Keep this with you and show to your community pharmacist(s) and doctor(s) at your next visit.

## 2018-03-29 NOTE — ED PROVIDER NOTES
History     Chief Complaint   Patient presents with     Abdominal Pain     Diarrhea     HPI  Andrew Lockwood is a 50 year old male with a history of DM type II, HIV/AIDS, CNS toxoplasmosis, recent appendicitis status post appendectomy complicated by intra-abdominal abscess, and chronic abdominal pain who presents for evaluation of abdominal pain and diarrhea. Patient reports he ate some beef yesterday and throughout the day today had malodorous burps, but then approximately one hour prior to arrival had the onset of loose, watery diarrhea. He states he has had at least 6-7 episodes of diarrhea in the past one hour prior to arrival. He denies blood in the stool. He also complains of diffuse abdominal pain with associated nausea and dizziness, but denies vomiting or decreased appetite. He denies fever, vomiting, or chest pain. No known sick contacts, however, patient reports he lives in a shelter. Additionally, patient reports he is currently taking a course of oral antibiotics for an ear infection, and has not been taking his anti-HIV/AIDS medications as he was recently released from [facility] two weeks ago and was not given those while he was incarcerated and has not refilled the prescription since that time.     I have reviewed the Medications, Allergies, Past Medical and Surgical History, and Social History in the [employer] system.  Past Medical History:   Diagnosis Date     AIDS (H)      Allergic rhinitis due to other allergen     DNS     Chronic abdominal pain      CNS toxoplasmosis (H)      Diabetes type 2, controlled (H)      GERD (gastroesophageal reflux disease)      HIV (human immunodeficiency virus infection) (H)      Periungual wart      Sleep apnea     doesn't use CPAP       Past Surgical History:   Procedure Laterality Date     C NONSPECIFIC PROCEDURE      right forearm fracture     COLONOSCOPY Left 1/22/2016    Procedure: COMBINED COLONOSCOPY, SINGLE OR MULTIPLE BIOPSY/POLYPECTOMY BY BIOPSY;  Surgeon: Parveen  Clark Castillo MD;  Location: [facility] GI     HC EXPLORE UNDESC TESTIS,INGUIN/SCROTAL       LAPAROSCOPIC APPENDECTOMY N/A 1/31/2018    Procedure: LAPAROSCOPIC APPENDECTOMY;  LAPAROSCOPIC APPENDECTOMY;  Surgeon: Dawn Holt MD;  Location: [facility] OR     LAPAROSCOPY DIAGNOSTIC (GENERAL) N/A 7/26/2016    Procedure: LAPAROSCOPY DIAGNOSTIC (GENERAL);  Surgeon: Susannah Arriaga MD;  Location: [facility] OR     OPTICAL TRACKING SYSTEM CRANIOTOMY, EXCISE TUMOR, COMBINED Left 4/10/2015    Procedure: COMBINED OPTICAL TRACKING SYSTEM CRANIOTOMY, EXCISE TUMOR;  Surgeon: Mirlande Colmenares MD;  Location: [facility] OR     REPAIR GAMEKEEPER'S THUMB Right 12/2/2016    Procedure: REPAIR LIGAMENT ULNAR COLLATERAL THUMB (GAMEKEEPER'S);  Surgeon: Evin Zamorano MD;  [facility]: [facility]       Family History   Problem Relation Age of Onset     DIABETES Brother      DIABETES Father      Alzheimer Disease Father      Unknown/Adopted Mother      DIABETES Paternal Grandfather      CANCER No family hx of      no skin cancer     Skin Cancer No family hx of      no famiy hx of skin cancer     Glaucoma No family hx of      Macular Degeneration No family hx of        Social History   Substance Use Topics     Smoking status: Current Every Day Smoker     Packs/day: 0.20     Last attempt to quit: 10/28/2016     Smokeless tobacco: Former User      Comment: 4 cigarettes     Alcohol use No      Comment: Last etoh in 2007       No current facility-administered medications for this encounter.      Current Outpatient Prescriptions   Medication     fluticasone (FLONASE) 50 MCG/ACT spray     amoxicillin-clavulanate (AUGMENTIN) 875-125 MG per tablet     amoxicillin-clavulanate (AUGMENTIN) 875-125 MG per tablet     GENVOYA [phone] mg tablet     ibuprofen (ADVIL/MOTRIN) 600 MG tablet     dulaglutide (TRULICITY) 1.5 MG/0.5ML pen     simethicone (GAS-X EXTRA STRENGTH) 125 MG CHEW chewable tablet     insulin pen needle (B-D U/F) 31G X 8 MM      "acetaminophen (TYLENOL) 325 MG tablet     alum & mag hydroxide-simethicone (MAALOX ADVANCED MAX ST) 400-400-40 MG/5ML SUSP suspension     blood glucose monitoring (NO BRAND SPECIFIED) meter device kit     blood glucose monitoring (NO BRAND SPECIFIED) test strip     blood glucose monitoring (NO BRAND SPECIFIED) meter device kit     blood glucose monitoring (NO BRAND SPECIFIED) test strip     blood glucose (NO BRAND SPECIFIED) lancets standard        Allergies   Allergen Reactions     Metformin      Abdominal pain       Review of Systems   Constitutional: Negative for appetite change, chills and fever.   Respiratory: Negative for cough and shortness of breath.    Cardiovascular: Negative for chest pain.   Gastrointestinal: Positive for abdominal pain (diffuse), diarrhea and nausea. Negative for vomiting.   Neurological: Positive for dizziness.   All other systems reviewed and are negative.      Physical Exam   BP: 129/89  Pulse: 104  Temp: 97.9  F (36.6  C)  Resp: 18  Height: 162.6 cm (5' 4\")  Weight: 77.1 kg (170 lb)  SpO2: 96 %      Physical Exam  Physical Exam   Constitutional:   well nourished, well developed, resting comfortably   HENT:   Head: Normocephalic and atraumatic.   Eyes: Conjunctivae are normal. Pupils are equal, round, and reactive to light.   TMS are clear, pharynx has no erythema or exudate, mucous membranes are moist  Neck:   no adenopathy, no bony tenderness  Cardiovascular: regular rate and rhythm without murmurs or gallops  Pulmonary/Chest: Clear to auscultation bilaterally, with no wheezes or retractions. No respiratory distress.  GI: Soft with good bowel sounds.  Non-tender, non-distended, with no guarding, no rebound, no peritoneal signs.   Back:  No bony or CVA tenderness   Musculoskeletal:  no edema or clubbing   Skin: Skin is warm and dry. No rash noted.   Neurological: alert and oriented to person, place, and time. Nonfocal exam  Psychiatric:  normal mood and affect.    ED Course     ED " Course     Procedures       1:35 AM  The patient was seen and examined by Dr. Bruno in Room 18.          Critical Care time:  none             Results for orders placed or performed during the hospital encounter of 03/29/18   CBC with platelets differential   Result Value Ref Range    WBC 8.5 4.0 - 11.0 10e9/L    RBC Count 4.59 4.4 - 5.9 10e12/L    Hemoglobin 14.4 13.3 - 17.7 g/dL    Hematocrit 42.9 40.0 - 53.0 %    MCV 94 78 - 100 fl    MCH 31.4 26.5 - 33.0 pg    MCHC 33.6 31.5 - 36.5 g/dL    RDW 13.7 10.0 - 15.0 %    Platelet Count 268 150 - 450 10e9/L    Diff Method Automated Method     % Neutrophils 49.3 %    % Lymphocytes 37.8 %    % Monocytes 8.3 %    % Eosinophils 3.3 %    % Basophils 0.5 %    % Immature Granulocytes 0.8 %    Nucleated RBCs 0 0 /100    Absolute Neutrophil 4.2 1.6 - 8.3 10e9/L    Absolute Lymphocytes 3.2 0.8 - 5.3 10e9/L    Absolute Monocytes 0.7 0.0 - 1.3 10e9/L    Absolute Eosinophils 0.3 0.0 - 0.7 10e9/L    Absolute Basophils 0.0 0.0 - 0.2 10e9/L    Abs Immature Granulocytes 0.1 0 - 0.4 10e9/L    Absolute Nucleated RBC 0.0    Basic metabolic panel   Result Value Ref Range    Sodium 140 133 - 144 mmol/L    Potassium 3.3 (L) 3.4 - 5.3 mmol/L    Chloride 106 94 - 109 mmol/L    Carbon Dioxide 23 20 - 32 mmol/L    Anion Gap 11 3 - 14 mmol/L    Glucose 124 (H) 70 - 99 mg/dL    Urea Nitrogen 13 7 - 30 mg/dL    Creatinine 0.81 0.66 - 1.25 mg/dL    GFR Estimate >90 >60 mL/min/1.7m2    GFR Estimate If Black >90 >60 mL/min/1.7m2    Calcium 9.8 8.5 - 10.1 mg/dL   Clostridium difficile toxin B PCR   Result Value Ref Range    Specimen Description Feces     C Diff Toxin B PCR Negative NEG^Negative   Enteric Bacteria and Virus Panel by SUZETTE Stool   Result Value Ref Range    Campylobacter group by SUZETTE Not Detected NDET^Not Detected    Salmonella species by SUZETTE Not Detected NDET^Not Detected    Shigella species by SUZETTE Not Detected NDET^Not Detected    Vibrio group by SUZETTE Not Detected NDET^Not Detected     Rotavirus A by SUZETTE Not Detected NDET^Not Detected    Shiga toxin 1 gene by SUZETTE Not Detected NDET^Not Detected    Shiga toxin 2 gene by SUZETTE Not Detected NDET^Not Detected    Norovirus I and II by SUZETTE Not Detected NDET^Not Detected    Yersinia enterocolitica by SUZETTE Not Detected NDET^Not Detected    Enteric pathogen comment       Testing performed by multiplexed, qualitative PCR using the [employer] Enteric   Pathogens Nucleic Acid Test. Results should not be used as the sole basis for diagnosis,   treatment, or other patient management decisions.             Assessments & Plan (with Medical Decision Making)   Andrew Lockwood is a 50 year old male with a history of DM type II, HIV/AIDS, CNS toxoplasmosis, recent appendicitis status post appendectomy complicated by intra-abdominal abscess, and chronic abdominal pain who presents for evaluation of abdominal pain and diarrhea.  Patient complains of diarrhea as loose watery stools, nonbloody that started 1 hour prior to arrival. No sick contacts. Patient is currently on Augmentin for ear infection. Upon arrival pt is well appearing, afebrile, no distress.  Abdomen is soft, mild tenderness palpation in the lower abdomen with no rebound, no guarding, no peritoneal signs.  At this time will check labs, stool culture, and reevaluate.  I reviewed competence of labs which are unremarkable with no leukocytosis, no acute metabolic or electrolyte abnormality except for mild hypokalemia at 3.3, C. difficile negative.  On reexamination patient is afebrile, noticed stress, nonsurgical abdomen.  At this time no emergent need for acute hospitalization.  Diarrhea likely due to viral illness versus antibiotics.  C diff and Enteric Bacteria Pathogen panel negative. Patient with no abdominal pain, nontoxic appearing, 1 episode of diarrhea during his ER stay.  At this time plan for discharge home with continue supportive care, oral hydration, brat diet, recommend follow-up with  primary care physician and infectious disease in the next 1-2 days for recheck due to co-morbidities. I discussed patient  Return precautions if high fever, persistent diarrhea, weakness, inability to eat or drink, dehydration, abdominal pain, or any worsening symptoms.  Patient understands and agrees the plan. .The patient is discharged home with instructions to return if their symptoms persist or worsen.  Plan for close follow-up with their primary physician.  I discussed workup, results, treatment, and plan with the patient.  Patient understands and agrees with the plan.      I have reviewed the nursing notes.    I have reviewed the findings, diagnosis, plan and need for follow up with the patient.    Discharge Medication List as of 3/29/2018  5:57 AM          Final diagnoses:   Diarrhea, unspecified type   Abdominal pain, generalized   INatalie, am serving as a trained medical scribe to document services personally performed by Sheila Bruno MD, based on the provider's statements to me.   Sheila KELLY MD, was physically present and have reviewed and verified the accuracy of this note documented by Natalie Partida.      3/29/2018   [facility], [place], EMERGENCY DEPARTMENT     Sheila Bruno MD  03/29/18 0630       Sheila Bruno MD  03/29/18 [id number]

## 2018-03-29 NOTE — ED AVS SNAPSHOT
[facility], [place], Emergency Department    [address] [place] [address]    Phone:  [phone]                                       Andrew Lockwood   MRN: [id number]    Department:  [facility], Emergency Department   Date of Visit:  3/29/2018           After Visit Summary Signature Page     I have received my discharge instructions, and my questions have been answered. I have discussed any challenges I see with this plan with the nurse or doctor.    ..........................................................................................................................................  Patient/Patient Representative Signature      ..........................................................................................................................................  Patient Representative Print Name and Relationship to Patient    ..................................................               ................................................  Date                                            Time    ..........................................................................................................................................  Reviewed by Signature/Title    ...................................................              ..............................................  Date                                                            Time

## 2018-04-02 ENCOUNTER — TELEPHONE (OUTPATIENT)
Dept: OTOLARYNGOLOGY | Facility: CLINIC | Age: 53
End: 2018-04-02

## 2018-04-12 ENCOUNTER — TELEPHONE (OUTPATIENT)
Dept: PHARMACY | Facility: CLINIC | Age: 53
End: 2018-04-12

## 2018-04-12 DIAGNOSIS — Z00.00 PREVENTATIVE HEALTH CARE: ICD-10-CM

## 2018-04-12 DIAGNOSIS — R10.13 EPIGASTRIC PAIN: ICD-10-CM

## 2018-04-12 DIAGNOSIS — Z21 HUMAN IMMUNODEFICIENCY VIRUS I INFECTION (H): ICD-10-CM

## 2018-04-12 NOTE — TELEPHONE ENCOUNTER
Filled 3 weeks of medboxes. Was supposed to  1 week on Tuesday and didn't.     Follow up: 04/16/18    Lenka Shannon, [facility]  [phone]

## 2018-04-13 ENCOUNTER — OFFICE VISIT (OUTPATIENT)
Dept: ENDOCRINOLOGY | Facility: CLINIC | Age: 53
End: 2018-04-13
Payer: MEDICAID

## 2018-04-13 VITALS
SYSTOLIC BLOOD PRESSURE: 138 MMHG | DIASTOLIC BLOOD PRESSURE: 90 MMHG | HEART RATE: 91 BPM | WEIGHT: 174.6 LBS | BODY MASS INDEX: 29.81 KG/M2 | HEIGHT: 64 IN

## 2018-04-13 DIAGNOSIS — E11.65 TYPE 2 DIABETES MELLITUS WITH HYPERGLYCEMIA, WITHOUT LONG-TERM CURRENT USE OF INSULIN (H): Primary | ICD-10-CM

## 2018-04-13 ASSESSMENT — PAIN SCALES - GENERAL: PAINLEVEL: NO PAIN (0)

## 2018-04-13 NOTE — LETTER
4/13/2018       RE: Andrew Lockwood  [address]  [facility] [address]     Dear Colleague,    Thank you for referring your patient, Andrew Lockwood, to the [facility]. Please see a copy of my visit note below.    HPI  Andrew Lockwood is a 50 year old male with type 2 diabetes mellitus here today for a follow up visit.  He was last seen in our clinic in Feb 2016 by Dr. Ponce.  Pt states he was diagnosed with type 2 diabetes mellitus in 2014.  He tells me that he has had laser treatment in his left eye in the past.  He was seen here on March 15, 2018 by ophthalmology and was diagnosed having an inferior and nasal peripheral horseshoe tear.  He denies any blurred vision or visual problems at this time.  No known nephropathy or neuropathy.  His medical history is also significant for HIV/AIDS and CNS toxoplasmosis.  Andrew tells me he is homeless at this time.  For his diabetes, he is currently taking Trulicity which he plans to stop due to a rash which he feels is from the Trulicity.  He has not tolerated metformin in the past due to GI distress.  He has no interest in using a SGL T2 inhibitor or Glipizide.  Patient expressed concerns about possible amputations from Invokana and has had hypoglycemia in the past while using glipizide.  His A1C was 7.5 % on 2/16/2018.  He did not bring his glucose meter today and has no blood sugar data for me.  On ROS today, patient has mild rash on arms, legs and trunk which is improving.  No fevers or chills.  He continues to smoke 1/2 pk of cigarettes a day.  Mild cough.  He denies any shortness of breath at rest.  Patient denies frequent headaches, nausea, vomiting, chest pain, abdominal pain or diarrhea.  He denies dysuria, hematuria, numbness tingling or pain in his feet toes hands or fingers.  No foot ulcers.    Diabetes Care  Retinopathy: no DR. He was seen by Oph here in 3/2018 with inferior and nasal horseshoe tears  left eye.  Nephropathy:none; urine microalbuminuria was negative in May 2017.  Neuropathy: none.  Foot Exam: no ulcers.  Taking aspirin:no.  Lipids: LDL 88 in 8/2017.      ROS  Please see under history of present illness.    Allergies  Allergies   Allergen Reactions     Metformin      Abdominal pain     Dulaglutide Rash       Medications  Current Outpatient Prescriptions   Medication Sig Dispense Refill     sitagliptin (JANUVIA) 50 MG tablet Take 1 tablet (50 mg) by mouth daily 90 tablet 3     omeprazole (PRILOSEC) 20 MG CR capsule TAKE ONE CAPSULE BY MOUTH EVERY DAY 90 capsule 1     fluticasone (FLONASE) 50 MCG/ACT spray Spray 2 sprays into both nostrils 2 times daily 9.9 mL 3     GENVOYA [phone] mg tablet Take 1 tablet by mouth daily (with breakfast) 30 tablet 0     ibuprofen (ADVIL/MOTRIN) 600 MG tablet Take 1 tablet (600 mg) by mouth every 8 hours as needed for moderate pain 60 tablet 0     simethicone (GAS-X EXTRA STRENGTH) 125 MG CHEW chewable tablet Take 1 tablet (125 mg) by mouth 2 times daily 120 tablet 0     insulin pen needle (B-D U/F) 31G X 8 MM Use 1 pen needles daily or as directed. 100 each 0     acetaminophen (TYLENOL) 325 MG tablet Take 2 tablets (650 mg) by mouth every 4 hours 100 tablet 1     alum & mag hydroxide-simethicone (MAALOX ADVANCED MAX ST) 400-400-40 MG/5ML SUSP suspension Take 30 mLs by mouth every 4 hours as needed for indigestion 355 mL 0     blood glucose monitoring (NO BRAND SPECIFIED) meter device kit Use to test blood sugar 4 times daily or as directed. 1 kit 0     blood glucose monitoring (NO BRAND SPECIFIED) test strip 1 strip by In Vitro route 4 times daily (before meals and nightly) Use to test blood sugars 4 times daily or as directed 100 strip 11     blood glucose monitoring (NO BRAND SPECIFIED) meter device kit Use to test blood sugar four times daily or as directed. 1 kit 0     blood glucose monitoring (NO BRAND SPECIFIED) test strip Use to test blood sugars four  times daily or as directed 100 each 5     blood glucose (NO BRAND SPECIFIED) lancets standard Use to test blood sugar four times daily or as directed. 100 Box 5       Family History  family history includes Alzheimer Disease in his father; DIABETES in his brother, father, and paternal grandfather; Unknown/Adopted in his mother. There is no history of CANCER, Skin Cancer, Glaucoma, or Macular Degeneration.    Social History   reports that he has been smoking.  He has been smoking about 0.20 packs per day. He has quit using smokeless tobacco. He reports that he does not drink alcohol or use illicit drugs.   Pt is homeless at this time.    Past Medical History  Past Medical History:   Diagnosis Date     AIDS (H)      Allergic rhinitis due to other allergen     DNS     Chronic abdominal pain      CNS toxoplasmosis (H)      Diabetes type 2, controlled (H)      GERD (gastroesophageal reflux disease)      HIV (human immunodeficiency virus infection) (H)      Periungual wart      Sleep apnea     doesn't use CPAP       Past Surgical History:   Procedure Laterality Date     C NONSPECIFIC PROCEDURE      right forearm fracture     COLONOSCOPY Left 1/22/2016    Procedure: COMBINED COLONOSCOPY, SINGLE OR MULTIPLE BIOPSY/POLYPECTOMY BY BIOPSY;  Surgeon: Clark Saini MD;  Location: [facility] GI     HC EXPLORE UNDESC TESTIS,INGUIN/SCROTAL       LAPAROSCOPIC APPENDECTOMY N/A 1/31/2018    Procedure: LAPAROSCOPIC APPENDECTOMY;  LAPAROSCOPIC APPENDECTOMY;  Surgeon: Dawn Holt MD;  Location: [facility] OR     LAPAROSCOPY DIAGNOSTIC (GENERAL) N/A 7/26/2016    Procedure: LAPAROSCOPY DIAGNOSTIC (GENERAL);  Surgeon: Susannah Arriaga MD;  Location: [facility] OR     OPTICAL TRACKING SYSTEM CRANIOTOMY, EXCISE TUMOR, COMBINED Left 4/10/2015    Procedure: COMBINED OPTICAL TRACKING SYSTEM CRANIOTOMY, EXCISE TUMOR;  Surgeon: Mirlande Colmenares MD;  Location: [facility] OR     REPAIR GAMEKEEPER'S THUMB Right 12/2/2016    Procedure: REPAIR  "LIGAMENT ULNAR COLLATERAL THUMB (GAMEKEEPER'S);  Surgeon: Evin Zamorano MD;  Location: [facility]       Physical Exam  /90  Pulse 91  Ht 1.626 m (5' 4\")  Wt 79.2 kg (174 lb 9.6 oz)  BMI 29.97 kg/m2  Body mass index is 29.97 kg/(m^2).    GENERAL : In no apparent distress  FEET:  No ulcers; normal monofilamentous exam.    RESULTS  Creatinine   Date Value Ref Range Status   03/29/2018 0.81 0.66 - 1.25 mg/dL Final     GFR Estimate   Date Value Ref Range Status   03/29/2018 >90 >60 mL/min/1.7m2 Final     Comment:     Non  GFR Calc     Hemoglobin A1C   Date Value Ref Range Status   08/11/2017 6.0 4.3 - 6.0 % Final     Potassium   Date Value Ref Range Status   03/29/2018 3.3 (L) 3.4 - 5.3 mmol/L Final     ALT   Date Value Ref Range Status   02/18/2018 30 0 - 70 U/L Final     AST   Date Value Ref Range Status   02/18/2018 26 0 - 45 U/L Final     TSH   Date Value Ref Range Status   02/15/2018 3.57 0.40 - 4.00 mU/L Final     T4 Free   Date Value Ref Range Status   04/14/2015 1.02 0.76 - 1.46 ng/dL Final       Cholesterol   Date Value Ref Range Status   08/11/2017 194 <200 mg/dL Final   08/18/2016 180 <200 mg/dL Final     HDL Cholesterol   Date Value Ref Range Status   08/11/2017 37 (L) >39 mg/dL Final   08/18/2016 33 (L) >39 mg/dL Final     LDL Cholesterol Calculated   Date Value Ref Range Status   08/11/2017 88 <100 mg/dL Final     Comment:     Desirable:       <100 mg/dl   08/18/2016 72 <100 mg/dL Final     Comment:     Desirable:       <100 mg/dl     Triglycerides   Date Value Ref Range Status   08/11/2017 345 (H) <150 mg/dL Final     Comment:     Borderline high:  150-199 mg/dl   High:             200-499 mg/dl   Very high:       >499 mg/dl     08/18/2016 377 (H) <150 mg/dL Final     Comment:     Borderline high:  150-199 mg/dl   High:             200-499 mg/dl   Very high:       >499 mg/dl   Non Fasting       Cholesterol/HDL Ratio   Date Value Ref Range Status   05/07/2015 5.0 0.0 - 5.0 " Final   04/18/2005 6.3 (H) 0.0 - 5.0 Final     Lab Results   Component Value Date    A1C 6.0 08/11/2017    A1C 6.2 05/23/2017    A1C 6.4 01/23/2017    A1C 7.0 08/18/2016    A1C 8.3 06/16/2016     A1C   7.5 % in 2/2018    ASSESSMENT/PLAN:    1. TYPE 2 DIABETES MELLITUS:  Uncontrolled type 2 diabetes mellitus.  I suggested adding Januvia 50 mg each am. I reviewed how Januvia works and the drug's possible side effects.  Andrew is ok with trying the Januvia and will see me in clinic in follow up in 1 month.  I reminded him to check his fasting blood sugar each a.m. and also to check his blood sugar predinner daily and to bring his glucose meter to all his appointments.  Pt was seen by Oph in 3/2018.  His feet are ok today.  His urine microalbuminuria was negative in May 2017.  He is normotensive today.    2. HIV/AIDS: Pt is followed here by ID staff.    3.  Return to Endocrine Clinic to see me in 1 month.      Sincerely,    Ashley Connelly PA-C

## 2018-04-13 NOTE — MR AVS SNAPSHOT
After Visit Summary   4/13/2018    Andrew Lockwood    MRN: [id number]           Patient Information     Date Of Birth          11/27/1967        Visit Information        Provider Department      4/13/2018 10:00 AM Ashley Connelly PA-C [facility] Endocrinology        Today's Diagnoses     Type 2 diabetes mellitus with hyperglycemia, without long-term current use of insulin (H)    -  1      Care Instructions    1. Discontinue Trulicity.  2.  Start Januvia 50 mg each am.  3. Check your fasting blood sugar each am and before dinner daily.  4. See me in clinic in 1 month.  Ashley Connelly PA-C          Follow-ups after your visit        Your next 10 appointments already scheduled     Apr 13, 2018 11:30 AM CDT   (Arrive by 11:15 AM)   RETURN HAND with Evin Zamorano MD   [facility] ([facility])    [address] [place] [id number] [phone]              Who to contact     Please call your clinic at [phone] to:    Ask questions about your health    Make or cancel appointments    Discuss your medicines    Learn about your test results    Speak to your doctor            Additional Information About Your Visit        [employer] Information     [employer] gives you secure access to your electronic health record. If you see a primary care provider, you can also send messages to your care team and make appointments. If you have questions, please call your primary care clinic.  If you do not have a primary care provider, please call [phone] and they will assist you.      [employer] is an electronic gateway that provides easy, online access to your medical records. With [employer], you can request a clinic appointment, read your test results, renew a prescription or communicate with your care team.     To access your existing account, please contact your [facility] or call [phone] for assistance.       "  Care EveryWhere ID     This is your Care EveryWhere ID. This could be used by other organizations to access your [facility] medical records  [id number]        Your Vitals Were     Pulse Height BMI (Body Mass Index)             91 1.626 m (5' 4\") 29.97 kg/m2          Blood Pressure from Last 3 Encounters:   04/13/18 138/90   03/29/18 120/80   03/21/18 131/83    Weight from Last 3 Encounters:   04/13/18 79.2 kg (174 lb 9.6 oz)   03/29/18 77.1 kg (170 lb)   03/27/18 77.1 kg (170 lb)              Today, you had the following     No orders found for display         Today's Medication Changes          These changes are accurate as of 4/13/18 10:58 AM.  If you have any questions, ask your nurse or doctor.               Start taking these medicines.        Dose/Directions    sitagliptin 50 MG tablet   Commonly known as:  JANUVIA   Used for:  Type 2 diabetes mellitus with hyperglycemia, without long-term current use of insulin (H)   Started by:  Ashley Connelly PA-C        Dose:  50 mg   Take 1 tablet (50 mg) by mouth daily   Quantity:  90 tablet   Refills:  3         Stop taking these medicines if you haven't already. Please contact your care team if you have questions.     amoxicillin-clavulanate 875-125 MG per tablet   Commonly known as:  AUGMENTIN   Stopped by:  Ashley Connelly PA-C           TRULICITY 1.5 MG/0.5ML pen   Generic drug:  dulaglutide   Stopped by:  Ashley Connelly PA-C                Where to get your medicines      These medications were sent to [facility] [place], [place] - [address]  [address], [facility] [address]    Hours:  TRANSPLANT PHONE NUMBER [phone] Phone:  [phone]     sitagliptin 50 MG tablet                Primary Care Provider Office Phone # Fax #    Rubio Cronin [phone]       [facility] [address]  [facility] [address]        Equal Access to Services     RA " GAAR : Hadii aad ku hadconnie Cody, waaxda luqadaha, qaybta kaalmada axel, dez ernestoin hayaatenzin crow dominic josh . So [facility] [phone].    ATENCIÓN: Si habla español, tiene a kohli disposición servicios gratuitos de asistencia lingüística. Llame al [phone].    We comply with applicable federal civil rights laws and [place] laws. We do not discriminate on the basis of race, color, national origin, age, disability, sex, sexual orientation, or gender identity.            Thank you!     Thank you for choosing [facility] ENDOCRINOLOGY  for your care. Our goal is always to provide you with excellent care. Hearing back from our patients is one way we can continue to improve our services. Please take a few minutes to complete the written survey that you may receive in the mail after your visit with us. Thank you!             Your Updated Medication List - Protect others around you: Learn how to safely use, store and throw away your medicines at www.disposemymeds.org.          This list is accurate as of 4/13/18 10:58 AM.  Always use your most recent med list.                   Brand Name Dispense Instructions for use Diagnosis    acetaminophen 325 MG tablet    TYLENOL    100 tablet    Take 2 tablets (650 mg) by mouth every 4 hours    Abdominal abscess (H)       alum & mag hydroxide-simethicone 400-400-40 MG/5ML Susp suspension    MAALOX ADVANCED MAX ST    355 mL    Take 30 mLs by mouth every 4 hours as needed for indigestion        blood glucose lancets standard    no brand specified    100 Box    Use to test blood sugar four times daily or as directed.    Type 2 diabetes mellitus without complication, without long-term current use of insulin (H)       * blood glucose monitoring meter device kit    no brand specified    1 kit    Use to test blood sugar four times daily or as directed.    Type 2 diabetes mellitus without complication, without long-term current use of insulin (H)       * blood glucose  monitoring meter device kit    no brand specified    1 kit    Use to test blood sugar 4 times daily or as directed.    Type 2 diabetes mellitus with complication, without long-term current use of insulin (H)       * blood glucose monitoring test strip    no brand specified    100 each    Use to test blood sugars four times daily or as directed    Type 2 diabetes mellitus without complication, without long-term current use of insulin (H)       * blood glucose monitoring test strip    no brand specified    100 strip    1 strip by In Vitro route 4 times daily (before meals and nightly) Use to test blood sugars 4 times daily or as directed    Type 2 diabetes mellitus with complication, without long-term current use of insulin (H)       Laezrwr-Cacsd-Pgqdclyr-TenofAF [phone] MG Tabs per tablet    GENVOYA    30 tablet    Take 1 tablet by mouth daily (with breakfast)    AIDS (acquired immune deficiency syndrome) (H)       fluticasone 50 MCG/ACT spray    FLONASE    9.9 mL    Spray 2 sprays into both nostrils 2 times daily    Acute otitis media, unspecified otitis media type       ibuprofen 600 MG tablet    ADVIL/MOTRIN    60 tablet    Take 1 tablet (600 mg) by mouth every 8 hours as needed for moderate pain    OME (otitis media with effusion), left       insulin pen needle 31G X 8 MM    B-D U/F    100 each    Use 1 pen needles daily or as directed.    Type 2 diabetes mellitus without complication, without long-term current use of insulin (H)       omeprazole 20 MG CR capsule    priLOSEC    90 capsule    TAKE ONE CAPSULE BY MOUTH EVERY DAY    Epigastric pain       simethicone 125 MG Chew chewable tablet    GAS-X EXTRA STRENGTH    120 tablet    Take 1 tablet (125 mg) by mouth 2 times daily    Gas pain       sitagliptin 50 MG tablet    JANUVIA    90 tablet    Take 1 tablet (50 mg) by mouth daily    Type 2 diabetes mellitus with hyperglycemia, without long-term current use of insulin (H)       * Notice:  This list has 4  medication(s) that are the same as other medications prescribed for you. Read the directions carefully, and ask your doctor or other care provider to review them with you.

## 2018-04-13 NOTE — PATIENT INSTRUCTIONS
1. Discontinue Trulicity.  2.  Start Januvia 50 mg each am.  3. Check your fasting blood sugar each am and before dinner daily.  4. See me in clinic in 1 month.  Ashley Connelly PA-C

## 2018-04-13 NOTE — PROGRESS NOTES
HPI  Andrew Lockwood is a 50 year old male with type 2 diabetes mellitus here today for a follow up visit.  He was last seen in our clinic in Feb 2016 by Dr. Ponce.  Pt states he was diagnosed with type 2 diabetes mellitus in 2014.  He tells me that he has had laser treatment in his left eye in the past.  He was seen here on March 15, 2018 by ophthalmology and was diagnosed having an inferior and nasal peripheral horseshoe tear.  He denies any blurred vision or visual problems at this time.  No known nephropathy or neuropathy.  His medical history is also significant for HIV/AIDS and CNS toxoplasmosis.  Andrew tells me he is homeless at this time.  For his diabetes, he is currently taking Trulicity which he plans to stop due to a rash which he feels is from the Trulicity.  He has not tolerated metformin in the past due to GI distress.  He has no interest in using a SGL T2 inhibitor or Glipizide.  Patient expressed concerns about possible amputations from Invokana and has had hypoglycemia in the past while using glipizide.  His A1C was 7.5 % on 2/16/2018.  He did not bring his glucose meter today and has no blood sugar data for me.  On ROS today, patient has mild rash on arms, legs and trunk which is improving.  No fevers or chills.  He continues to smoke 1/2 pk of cigarettes a day.  Mild cough.  He denies any shortness of breath at rest.  Patient denies frequent headaches, nausea, vomiting, chest pain, abdominal pain or diarrhea.  He denies dysuria, hematuria, numbness tingling or pain in his feet toes hands or fingers.  No foot ulcers.    Diabetes Care  Retinopathy: no DR. He was seen by Oph here in 3/2018 with inferior and nasal horseshoe tears left eye.  Nephropathy:none; urine microalbuminuria was negative in May 2017.  Neuropathy: none.  Foot Exam: no ulcers.  Taking aspirin:no.  Lipids: LDL 88 in 8/2017.      ROS  Please see under history of present illness.    Allergies  Allergies   Allergen Reactions      Metformin      Abdominal pain     Dulaglutide Rash       Medications  Current Outpatient Prescriptions   Medication Sig Dispense Refill     sitagliptin (JANUVIA) 50 MG tablet Take 1 tablet (50 mg) by mouth daily 90 tablet 3     omeprazole (PRILOSEC) 20 MG CR capsule TAKE ONE CAPSULE BY MOUTH EVERY DAY 90 capsule 1     fluticasone (FLONASE) 50 MCG/ACT spray Spray 2 sprays into both nostrils 2 times daily 9.9 mL 3     GENVOYA [id number] mg tablet Take 1 tablet by mouth daily (with breakfast) 30 tablet 0     ibuprofen (ADVIL/MOTRIN) 600 MG tablet Take 1 tablet (600 mg) by mouth every 8 hours as needed for moderate pain 60 tablet 0     simethicone (GAS-X EXTRA STRENGTH) 125 MG CHEW chewable tablet Take 1 tablet (125 mg) by mouth 2 times daily 120 tablet 0     insulin pen needle (B-D U/F) 31G X 8 MM Use 1 pen needles daily or as directed. 100 each 0     acetaminophen (TYLENOL) 325 MG tablet Take 2 tablets (650 mg) by mouth every 4 hours 100 tablet 1     alum & mag hydroxide-simethicone (MAALOX ADVANCED MAX ST) 400-400-40 MG/5ML SUSP suspension Take 30 mLs by mouth every 4 hours as needed for indigestion 355 mL 0     blood glucose monitoring (NO BRAND SPECIFIED) meter device kit Use to test blood sugar 4 times daily or as directed. 1 kit 0     blood glucose monitoring (NO BRAND SPECIFIED) test strip 1 strip by In Vitro route 4 times daily (before meals and nightly) Use to test blood sugars 4 times daily or as directed 100 strip 11     blood glucose monitoring (NO BRAND SPECIFIED) meter device kit Use to test blood sugar four times daily or as directed. 1 kit 0     blood glucose monitoring (NO BRAND SPECIFIED) test strip Use to test blood sugars four times daily or as directed 100 each 5     blood glucose (NO BRAND SPECIFIED) lancets standard Use to test blood sugar four times daily or as directed. 100 Box 5       Family History  family history includes Alzheimer Disease in his father; DIABETES in his brother, father,  "and paternal grandfather; Unknown/Adopted in his mother. There is no history of CANCER, Skin Cancer, Glaucoma, or Macular Degeneration.    Social History   reports that he has been smoking.  He has been smoking about 0.20 packs per day. He has quit using smokeless tobacco. He reports that he does not drink alcohol or use illicit drugs.   Pt is homeless at this time.    Past Medical History  Past Medical History:   Diagnosis Date     AIDS (H)      Allergic rhinitis due to other allergen     DNS     Chronic abdominal pain      CNS toxoplasmosis (H)      Diabetes type 2, controlled (H)      GERD (gastroesophageal reflux disease)      HIV (human immunodeficiency virus infection) (H)      Periungual wart      Sleep apnea     doesn't use CPAP       Past Surgical History:   Procedure Laterality Date     C NONSPECIFIC PROCEDURE      right forearm fracture     COLONOSCOPY Left 1/22/2016    Procedure: COMBINED COLONOSCOPY, SINGLE OR MULTIPLE BIOPSY/POLYPECTOMY BY BIOPSY;  Surgeon: Clark Saini MD;  Location: [facility]     HC EXPLORE UNDESC TESTIS,INGUIN/SCROTAL       LAPAROSCOPIC APPENDECTOMY N/A 1/31/2018    Procedure: LAPAROSCOPIC APPENDECTOMY;  LAPAROSCOPIC APPENDECTOMY;  Surgeon: Dawn Holt MD;  Location: [facility]     LAPAROSCOPY DIAGNOSTIC (GENERAL) N/A 7/26/2016    Procedure: LAPAROSCOPY DIAGNOSTIC (GENERAL);  Surgeon: Susannah Arriaga MD;  Location: [facility]     OPTICAL TRACKING SYSTEM CRANIOTOMY, EXCISE TUMOR, COMBINED Left 4/10/2015    Procedure: COMBINED OPTICAL TRACKING SYSTEM CRANIOTOMY, EXCISE TUMOR;  Surgeon: Mirlande Colmenares MD;  Location: [facility]     REPAIR GAMEKEEPER'S THUMB Right 12/2/2016    Procedure: REPAIR LIGAMENT ULNAR COLLATERAL THUMB (GAMEKEEPER'S);  Surgeon: Evin Zamorano MD;  [facility]       Physical Exam  /90  Pulse 91  Ht 1.626 m (5' 4\")  Wt 79.2 kg (174 lb 9.6 oz)  BMI 29.97 kg/m2  Body mass index is 29.97 kg/(m^2).    GENERAL : In no apparent " distress  FEET:  No ulcers; normal monofilamentous exam.    RESULTS  Creatinine   Date Value Ref Range Status   03/29/2018 0.81 0.66 - 1.25 mg/dL Final     GFR Estimate   Date Value Ref Range Status   03/29/2018 >90 >60 mL/min/1.7m2 Final     Comment:     Non  GFR Calc     Hemoglobin A1C   Date Value Ref Range Status   08/11/2017 6.0 4.3 - 6.0 % Final     Potassium   Date Value Ref Range Status   03/29/2018 3.3 (L) 3.4 - 5.3 mmol/L Final     ALT   Date Value Ref Range Status   02/18/2018 30 0 - 70 U/L Final     AST   Date Value Ref Range Status   02/18/2018 26 0 - 45 U/L Final     TSH   Date Value Ref Range Status   02/15/2018 3.57 0.40 - 4.00 mU/L Final     T4 Free   Date Value Ref Range Status   04/14/2015 1.02 0.76 - 1.46 ng/dL Final       Cholesterol   Date Value Ref Range Status   08/11/2017 194 <200 mg/dL Final   08/18/2016 180 <200 mg/dL Final     HDL Cholesterol   Date Value Ref Range Status   08/11/2017 37 (L) >39 mg/dL Final   08/18/2016 33 (L) >39 mg/dL Final     LDL Cholesterol Calculated   Date Value Ref Range Status   08/11/2017 88 <100 mg/dL Final     Comment:     Desirable:       <100 mg/dl   08/18/2016 72 <100 mg/dL Final     Comment:     Desirable:       <100 mg/dl     Triglycerides   Date Value Ref Range Status   08/11/2017 345 (H) <150 mg/dL Final     Comment:     Borderline high:  150-199 mg/dl   High:             200-499 mg/dl   Very high:       >499 mg/dl     08/18/2016 377 (H) <150 mg/dL Final     Comment:     Borderline high:  150-199 mg/dl   High:             200-499 mg/dl   Very high:       >499 mg/dl   Non Fasting       Cholesterol/HDL Ratio   Date Value Ref Range Status   05/07/2015 5.0 0.0 - 5.0 Final   04/18/2005 6.3 (H) 0.0 - 5.0 Final     Lab Results   Component Value Date    A1C 6.0 08/11/2017    A1C 6.2 05/23/2017    A1C 6.4 01/23/2017    A1C 7.0 08/18/2016    A1C 8.3 06/16/2016     A1C   7.5 % in 2/2018    ASSESSMENT/PLAN:    1. TYPE 2 DIABETES MELLITUS:   Uncontrolled type 2 diabetes mellitus.  I suggested adding Januvia 50 mg each am. I reviewed how Januvia works and the drug's possible side effects.  Andrew is ok with trying the Januvia and will see me in clinic in follow up in 1 month.  I reminded him to check his fasting blood sugar each a.m. and also to check his blood sugar predinner daily and to bring his glucose meter to all his appointments.  Pt was seen by Oph in 3/2018.  His feet are ok today.  His urine microalbuminuria was negative in May 2017.  He is normotensive today.    2. HIV/AIDS: Pt is followed here by ID staff.    3.  Return to Endocrine Clinic to see me in 1 month.

## 2018-04-13 NOTE — TELEPHONE ENCOUNTER
Patient should RTc for further concern. Initial medication was 3/27,  Course of medication was for 10 days.

## 2018-04-13 NOTE — TELEPHONE ENCOUNTER
I was under the impression from the chart notes that the patient never got Trulicity from the pharmacy due to safety issues since he is homeless. He missed his recommended f/up with me one month after his last visit with me. Russ Ponce MD

## 2018-04-13 NOTE — TELEPHONE ENCOUNTER
amoxicillin-clavulanate (AUGMENTIN) 875-125 MG   Last Written Prescription Date:  3/27/18  Last Fill Quantity: 20,   # refills: 0  Last Office Visit : 3/27/18  Future Office visit:  NONE    Routing refill request to provider for review/approval because:  Drug not active on patient's medication list   DC'D  4/13/8

## 2018-04-16 ENCOUNTER — ANESTHESIA EVENT (OUTPATIENT)
Dept: SURGERY | Facility: CLINIC | Age: 53
End: 2018-04-16
Payer: COMMERCIAL

## 2018-04-16 ENCOUNTER — HOSPITAL ENCOUNTER (INPATIENT)
Facility: CLINIC | Age: 53
LOS: 3 days | Discharge: NURSING, REHAB OR LONG-TERM CARE | End: 2018-04-19
Attending: EMERGENCY MEDICINE | Admitting: SURGERY
Payer: COMMERCIAL

## 2018-04-16 ENCOUNTER — ANESTHESIA (OUTPATIENT)
Dept: SURGERY | Facility: CLINIC | Age: 53
End: 2018-04-16
Payer: COMMERCIAL

## 2018-04-16 ENCOUNTER — APPOINTMENT (OUTPATIENT)
Dept: CT IMAGING | Facility: CLINIC | Age: 53
End: 2018-04-16
Attending: EMERGENCY MEDICINE
Payer: COMMERCIAL

## 2018-04-16 ENCOUNTER — APPOINTMENT (OUTPATIENT)
Dept: GENERAL RADIOLOGY | Facility: CLINIC | Age: 53
End: 2018-04-16
Attending: EMERGENCY MEDICINE
Payer: COMMERCIAL

## 2018-04-16 ENCOUNTER — APPOINTMENT (OUTPATIENT)
Dept: GENERAL RADIOLOGY | Facility: CLINIC | Age: 53
End: 2018-04-16
Payer: COMMERCIAL

## 2018-04-16 DIAGNOSIS — Z21 HUMAN IMMUNODEFICIENCY VIRUS I INFECTION (H): ICD-10-CM

## 2018-04-16 DIAGNOSIS — K56.609 SMALL BOWEL OBSTRUCTION (H): ICD-10-CM

## 2018-04-16 LAB
ALBUMIN SERPL-MCNC: 4 G/DL (ref 3.4–5)
ALBUMIN UR-MCNC: NEGATIVE MG/DL
ALP SERPL-CCNC: 98 U/L (ref 40–150)
ALT SERPL W P-5'-P-CCNC: 20 U/L (ref 0–70)
ANION GAP SERPL CALCULATED.3IONS-SCNC: 13 MMOL/L (ref 3–14)
APPEARANCE UR: CLEAR
AST SERPL W P-5'-P-CCNC: 19 U/L (ref 0–45)
BASOPHILS # BLD AUTO: 0 10E9/L (ref 0–0.2)
BASOPHILS NFR BLD AUTO: 0.3 %
BILIRUB SERPL-MCNC: 0.5 MG/DL (ref 0.2–1.3)
BILIRUB UR QL STRIP: NEGATIVE
BUN SERPL-MCNC: 11 MG/DL (ref 7–30)
CALCIUM SERPL-MCNC: 9.3 MG/DL (ref 8.5–10.1)
CHLORIDE SERPL-SCNC: 103 MMOL/L (ref 94–109)
CO2 SERPL-SCNC: 24 MMOL/L (ref 20–32)
COLOR UR AUTO: NORMAL
CREAT SERPL-MCNC: 0.86 MG/DL (ref 0.66–1.25)
DIFFERENTIAL METHOD BLD: ABNORMAL
EOSINOPHIL # BLD AUTO: 0.4 10E9/L (ref 0–0.7)
EOSINOPHIL NFR BLD AUTO: 2.9 %
ERYTHROCYTE [DISTWIDTH] IN BLOOD BY AUTOMATED COUNT: 13.6 % (ref 10–15)
GFR SERPL CREATININE-BSD FRML MDRD: >90 ML/MIN/1.7M2
GLUCOSE BLDC GLUCOMTR-MCNC: 109 MG/DL (ref 70–99)
GLUCOSE BLDC GLUCOMTR-MCNC: 139 MG/DL (ref 70–99)
GLUCOSE BLDC GLUCOMTR-MCNC: 152 MG/DL (ref 70–99)
GLUCOSE BLDC GLUCOMTR-MCNC: 153 MG/DL (ref 70–99)
GLUCOSE BLDC GLUCOMTR-MCNC: 155 MG/DL (ref 70–99)
GLUCOSE BLDC GLUCOMTR-MCNC: 156 MG/DL (ref 70–99)
GLUCOSE BLDC GLUCOMTR-MCNC: 165 MG/DL (ref 70–99)
GLUCOSE BLDC GLUCOMTR-MCNC: 166 MG/DL (ref 70–99)
GLUCOSE SERPL-MCNC: 130 MG/DL (ref 70–99)
GLUCOSE UR STRIP-MCNC: NEGATIVE MG/DL
HCT VFR BLD AUTO: 44.2 % (ref 40–53)
HGB BLD-MCNC: 15 G/DL (ref 13.3–17.7)
HGB UR QL STRIP: NEGATIVE
IMM GRANULOCYTES # BLD: 0.1 10E9/L (ref 0–0.4)
IMM GRANULOCYTES NFR BLD: 1 %
KETONES UR STRIP-MCNC: NEGATIVE MG/DL
LACTATE BLD-SCNC: 1 MMOL/L (ref 0.7–2)
LEUKOCYTE ESTERASE UR QL STRIP: NEGATIVE
LIPASE SERPL-CCNC: 198 U/L (ref 73–393)
LYMPHOCYTES # BLD AUTO: 3.8 10E9/L (ref 0.8–5.3)
LYMPHOCYTES NFR BLD AUTO: 31.4 %
MCH RBC QN AUTO: 31.1 PG (ref 26.5–33)
MCHC RBC AUTO-ENTMCNC: 33.9 G/DL (ref 31.5–36.5)
MCV RBC AUTO: 92 FL (ref 78–100)
MONOCYTES # BLD AUTO: 0.8 10E9/L (ref 0–1.3)
MONOCYTES NFR BLD AUTO: 6.8 %
NEUTROPHILS # BLD AUTO: 6.9 10E9/L (ref 1.6–8.3)
NEUTROPHILS NFR BLD AUTO: 57.6 %
NITRATE UR QL: NEGATIVE
NRBC # BLD AUTO: 0 10*3/UL
NRBC BLD AUTO-RTO: 0 /100
PH UR STRIP: 7 PH (ref 5–7)
PLATELET # BLD AUTO: 295 10E9/L (ref 150–450)
POTASSIUM SERPL-SCNC: 3.3 MMOL/L (ref 3.4–5.3)
PROT SERPL-MCNC: 7.8 G/DL (ref 6.8–8.8)
RADIOLOGIST FLAGS: ABNORMAL
RBC # BLD AUTO: 4.83 10E12/L (ref 4.4–5.9)
RBC #/AREA URNS AUTO: 1 /HPF (ref 0–2)
SODIUM SERPL-SCNC: 140 MMOL/L (ref 133–144)
SOURCE: NORMAL
SP GR UR STRIP: 1 (ref 1–1.03)
UROBILINOGEN UR STRIP-MCNC: NORMAL MG/DL (ref 0–2)
WBC # BLD AUTO: 12 10E9/L (ref 4–11)
WBC #/AREA URNS AUTO: NORMAL /HPF (ref 0–5)

## 2018-04-16 PROCEDURE — 83690 ASSAY OF LIPASE: CPT | Performed by: EMERGENCY MEDICINE

## 2018-04-16 PROCEDURE — 0DNK4ZZ RELEASE ASCENDING COLON, PERCUTANEOUS ENDOSCOPIC APPROACH: ICD-10-PCS | Performed by: SURGERY

## 2018-04-16 PROCEDURE — 96361 HYDRATE IV INFUSION ADD-ON: CPT | Performed by: EMERGENCY MEDICINE

## 2018-04-16 PROCEDURE — 71000014 ZZH RECOVERY PHASE 1 LEVEL 2 FIRST HR: Performed by: SURGERY

## 2018-04-16 PROCEDURE — 37000009 ZZH ANESTHESIA TECHNICAL FEE, EACH ADDTL 15 MIN: Performed by: SURGERY

## 2018-04-16 PROCEDURE — 00000146 ZZHCL STATISTIC GLUCOSE BY METER IP

## 2018-04-16 PROCEDURE — 83605 ASSAY OF LACTIC ACID: CPT | Performed by: EMERGENCY MEDICINE

## 2018-04-16 PROCEDURE — 74019 RADEX ABDOMEN 2 VIEWS: CPT

## 2018-04-16 PROCEDURE — 99285 EMERGENCY DEPT VISIT HI MDM: CPT | Mod: Z6 | Performed by: EMERGENCY MEDICINE

## 2018-04-16 PROCEDURE — 36000059 ZZH SURGERY LEVEL 3 EA 15 ADDTL MIN UMMC: Performed by: SURGERY

## 2018-04-16 PROCEDURE — 80053 COMPREHEN METABOLIC PANEL: CPT | Performed by: EMERGENCY MEDICINE

## 2018-04-16 PROCEDURE — 25000128 H RX IP 250 OP 636: Performed by: STUDENT IN AN ORGANIZED HEALTH CARE EDUCATION/TRAINING PROGRAM

## 2018-04-16 PROCEDURE — 40000986 XR ABDOMEN 1 VW

## 2018-04-16 PROCEDURE — 25000128 H RX IP 250 OP 636: Performed by: EMERGENCY MEDICINE

## 2018-04-16 PROCEDURE — 00000146 ZZHCL STATISTIC GLUCOSE BY METER IP: Performed by: EMERGENCY MEDICINE

## 2018-04-16 PROCEDURE — 25000566 ZZH SEVOFLURANE, EA 15 MIN: Performed by: SURGERY

## 2018-04-16 PROCEDURE — 96374 THER/PROPH/DIAG INJ IV PUSH: CPT | Performed by: EMERGENCY MEDICINE

## 2018-04-16 PROCEDURE — 71000015 ZZH RECOVERY PHASE 1 LEVEL 2 EA ADDTL HR: Performed by: SURGERY

## 2018-04-16 PROCEDURE — 25000125 ZZHC RX 250: Performed by: EMERGENCY MEDICINE

## 2018-04-16 PROCEDURE — 25000125 ZZHC RX 250: Performed by: NURSE ANESTHETIST, CERTIFIED REGISTERED

## 2018-04-16 PROCEDURE — 36000057 ZZH SURGERY LEVEL 3 1ST 30 MIN - UMMC: Performed by: SURGERY

## 2018-04-16 PROCEDURE — 25000128 H RX IP 250 OP 636: Performed by: SURGERY

## 2018-04-16 PROCEDURE — 12000025 ZZH R&B TRANSPLANT INTERMEDIATE

## 2018-04-16 PROCEDURE — 85025 COMPLETE CBC W/AUTO DIFF WBC: CPT | Performed by: EMERGENCY MEDICINE

## 2018-04-16 PROCEDURE — 25000132 ZZH RX MED GY IP 250 OP 250 PS 637: Performed by: STUDENT IN AN ORGANIZED HEALTH CARE EDUCATION/TRAINING PROGRAM

## 2018-04-16 PROCEDURE — 37000008 ZZH ANESTHESIA TECHNICAL FEE, 1ST 30 MIN: Performed by: SURGERY

## 2018-04-16 PROCEDURE — 27210794 ZZH OR GENERAL SUPPLY STERILE: Performed by: SURGERY

## 2018-04-16 PROCEDURE — C9399 UNCLASSIFIED DRUGS OR BIOLOG: HCPCS | Performed by: NURSE ANESTHETIST, CERTIFIED REGISTERED

## 2018-04-16 PROCEDURE — 83605 ASSAY OF LACTIC ACID: CPT | Performed by: SURGERY

## 2018-04-16 PROCEDURE — 25000131 ZZH RX MED GY IP 250 OP 636 PS 637: Performed by: SURGERY

## 2018-04-16 PROCEDURE — 25000128 H RX IP 250 OP 636: Performed by: NURSE ANESTHETIST, CERTIFIED REGISTERED

## 2018-04-16 PROCEDURE — 81001 URINALYSIS AUTO W/SCOPE: CPT | Performed by: EMERGENCY MEDICINE

## 2018-04-16 PROCEDURE — 96376 TX/PRO/DX INJ SAME DRUG ADON: CPT | Performed by: EMERGENCY MEDICINE

## 2018-04-16 PROCEDURE — 40000170 ZZH STATISTIC PRE-PROCEDURE ASSESSMENT II: Performed by: SURGERY

## 2018-04-16 PROCEDURE — 74177 CT ABD & PELVIS W/CONTRAST: CPT

## 2018-04-16 PROCEDURE — 25800025 ZZH RX 258: Performed by: SURGERY

## 2018-04-16 PROCEDURE — 25000132 ZZH RX MED GY IP 250 OP 250 PS 637: Performed by: EMERGENCY MEDICINE

## 2018-04-16 PROCEDURE — 25000125 ZZHC RX 250: Performed by: SURGERY

## 2018-04-16 PROCEDURE — 96375 TX/PRO/DX INJ NEW DRUG ADDON: CPT | Performed by: EMERGENCY MEDICINE

## 2018-04-16 PROCEDURE — 99285 EMERGENCY DEPT VISIT HI MDM: CPT | Mod: 25 | Performed by: EMERGENCY MEDICINE

## 2018-04-16 RX ORDER — LIDOCAINE 40 MG/G
CREAM TOPICAL
Status: DISCONTINUED | OUTPATIENT
Start: 2018-04-16 | End: 2018-04-19 | Stop reason: HOSPADM

## 2018-04-16 RX ORDER — NALOXONE HYDROCHLORIDE 0.4 MG/ML
.1-.4 INJECTION, SOLUTION INTRAMUSCULAR; INTRAVENOUS; SUBCUTANEOUS
Status: ACTIVE | OUTPATIENT
Start: 2018-04-16 | End: 2018-04-17

## 2018-04-16 RX ORDER — SODIUM CHLORIDE, SODIUM LACTATE, POTASSIUM CHLORIDE, CALCIUM CHLORIDE 600; 310; 30; 20 MG/100ML; MG/100ML; MG/100ML; MG/100ML
1000 INJECTION, SOLUTION INTRAVENOUS CONTINUOUS
Status: DISCONTINUED | OUTPATIENT
Start: 2018-04-16 | End: 2018-04-17

## 2018-04-16 RX ORDER — DEXAMETHASONE SODIUM PHOSPHATE 4 MG/ML
INJECTION, SOLUTION INTRA-ARTICULAR; INTRALESIONAL; INTRAMUSCULAR; INTRAVENOUS; SOFT TISSUE PRN
Status: DISCONTINUED | OUTPATIENT
Start: 2018-04-16 | End: 2018-04-16

## 2018-04-16 RX ORDER — NICOTINE POLACRILEX 4 MG
15-30 LOZENGE BUCCAL
Status: DISCONTINUED | OUTPATIENT
Start: 2018-04-16 | End: 2018-04-19 | Stop reason: HOSPADM

## 2018-04-16 RX ORDER — POTASSIUM CHLORIDE 750 MG/1
20-40 TABLET, EXTENDED RELEASE ORAL
Status: DISCONTINUED | OUTPATIENT
Start: 2018-04-16 | End: 2018-04-18

## 2018-04-16 RX ORDER — ONDANSETRON 2 MG/ML
4 INJECTION INTRAMUSCULAR; INTRAVENOUS EVERY 30 MIN PRN
Status: DISCONTINUED | OUTPATIENT
Start: 2018-04-16 | End: 2018-04-16 | Stop reason: HOSPADM

## 2018-04-16 RX ORDER — ONDANSETRON 2 MG/ML
4 INJECTION INTRAMUSCULAR; INTRAVENOUS EVERY 6 HOURS PRN
Status: DISCONTINUED | OUTPATIENT
Start: 2018-04-16 | End: 2018-04-19 | Stop reason: HOSPADM

## 2018-04-16 RX ORDER — ONDANSETRON 4 MG/1
4 TABLET, ORALLY DISINTEGRATING ORAL EVERY 6 HOURS PRN
Status: DISCONTINUED | OUTPATIENT
Start: 2018-04-16 | End: 2018-04-19 | Stop reason: HOSPADM

## 2018-04-16 RX ORDER — ERTAPENEM 1 G/1
1 INJECTION, POWDER, LYOPHILIZED, FOR SOLUTION INTRAMUSCULAR; INTRAVENOUS EVERY 24 HOURS
Status: DISCONTINUED | OUTPATIENT
Start: 2018-04-16 | End: 2018-04-16 | Stop reason: HOSPADM

## 2018-04-16 RX ORDER — PROPOFOL 10 MG/ML
INJECTION, EMULSION INTRAVENOUS PRN
Status: DISCONTINUED | OUTPATIENT
Start: 2018-04-16 | End: 2018-04-16

## 2018-04-16 RX ORDER — CEFAZOLIN SODIUM 1 G/3ML
1 INJECTION, POWDER, FOR SOLUTION INTRAMUSCULAR; INTRAVENOUS SEE ADMIN INSTRUCTIONS
Status: DISCONTINUED | OUTPATIENT
Start: 2018-04-16 | End: 2018-04-16 | Stop reason: HOSPADM

## 2018-04-16 RX ORDER — MAGNESIUM SULFATE HEPTAHYDRATE 40 MG/ML
4 INJECTION, SOLUTION INTRAVENOUS EVERY 4 HOURS PRN
Status: DISCONTINUED | OUTPATIENT
Start: 2018-04-16 | End: 2018-04-18

## 2018-04-16 RX ORDER — MORPHINE SULFATE 2 MG/ML
2-4 INJECTION, SOLUTION INTRAMUSCULAR; INTRAVENOUS
Status: DISCONTINUED | OUTPATIENT
Start: 2018-04-16 | End: 2018-04-16

## 2018-04-16 RX ORDER — LIDOCAINE HYDROCHLORIDE 20 MG/ML
INJECTION, SOLUTION INFILTRATION; PERINEURAL PRN
Status: DISCONTINUED | OUTPATIENT
Start: 2018-04-16 | End: 2018-04-16

## 2018-04-16 RX ORDER — HYDROMORPHONE HCL/0.9% NACL/PF 0.2MG/0.2
0.2 SYRINGE (ML) INTRAVENOUS
Status: DISCONTINUED | OUTPATIENT
Start: 2018-04-16 | End: 2018-04-17

## 2018-04-16 RX ORDER — SODIUM CHLORIDE, SODIUM LACTATE, POTASSIUM CHLORIDE, CALCIUM CHLORIDE 600; 310; 30; 20 MG/100ML; MG/100ML; MG/100ML; MG/100ML
INJECTION, SOLUTION INTRAVENOUS CONTINUOUS PRN
Status: DISCONTINUED | OUTPATIENT
Start: 2018-04-16 | End: 2018-04-16

## 2018-04-16 RX ORDER — ONDANSETRON 2 MG/ML
INJECTION INTRAMUSCULAR; INTRAVENOUS PRN
Status: DISCONTINUED | OUTPATIENT
Start: 2018-04-16 | End: 2018-04-16

## 2018-04-16 RX ORDER — PROCHLORPERAZINE MALEATE 5 MG
10 TABLET ORAL EVERY 6 HOURS PRN
Status: DISCONTINUED | OUTPATIENT
Start: 2018-04-16 | End: 2018-04-19 | Stop reason: HOSPADM

## 2018-04-16 RX ORDER — NALOXONE HYDROCHLORIDE 0.4 MG/ML
.1-.4 INJECTION, SOLUTION INTRAMUSCULAR; INTRAVENOUS; SUBCUTANEOUS
Status: DISCONTINUED | OUTPATIENT
Start: 2018-04-17 | End: 2018-04-19 | Stop reason: HOSPADM

## 2018-04-16 RX ORDER — DEXTROSE MONOHYDRATE 25 G/50ML
25-50 INJECTION, SOLUTION INTRAVENOUS
Status: DISCONTINUED | OUTPATIENT
Start: 2018-04-16 | End: 2018-04-19 | Stop reason: HOSPADM

## 2018-04-16 RX ORDER — POTASSIUM CL/LIDO/0.9 % NACL 10MEQ/0.1L
10 INTRAVENOUS SOLUTION, PIGGYBACK (ML) INTRAVENOUS
Status: DISCONTINUED | OUTPATIENT
Start: 2018-04-16 | End: 2018-04-18

## 2018-04-16 RX ORDER — FENTANYL CITRATE 50 UG/ML
25-50 INJECTION, SOLUTION INTRAMUSCULAR; INTRAVENOUS
Status: DISCONTINUED | OUTPATIENT
Start: 2018-04-16 | End: 2018-04-16 | Stop reason: HOSPADM

## 2018-04-16 RX ORDER — POTASSIUM CHLORIDE 1.5 G/1.58G
20-40 POWDER, FOR SOLUTION ORAL
Status: DISCONTINUED | OUTPATIENT
Start: 2018-04-16 | End: 2018-04-18

## 2018-04-16 RX ORDER — HYDROMORPHONE HYDROCHLORIDE 1 MG/ML
.3-.5 INJECTION, SOLUTION INTRAMUSCULAR; INTRAVENOUS; SUBCUTANEOUS EVERY 5 MIN PRN
Status: DISCONTINUED | OUTPATIENT
Start: 2018-04-16 | End: 2018-04-16 | Stop reason: HOSPADM

## 2018-04-16 RX ORDER — MORPHINE SULFATE 2 MG/ML
2 INJECTION, SOLUTION INTRAMUSCULAR; INTRAVENOUS ONCE
Status: COMPLETED | OUTPATIENT
Start: 2018-04-16 | End: 2018-04-16

## 2018-04-16 RX ORDER — KETOROLAC TROMETHAMINE 30 MG/ML
INJECTION, SOLUTION INTRAMUSCULAR; INTRAVENOUS PRN
Status: DISCONTINUED | OUTPATIENT
Start: 2018-04-16 | End: 2018-04-16

## 2018-04-16 RX ORDER — CEFAZOLIN SODIUM 2 G/100ML
2 INJECTION, SOLUTION INTRAVENOUS
Status: DISCONTINUED | OUTPATIENT
Start: 2018-04-16 | End: 2018-04-16 | Stop reason: HOSPADM

## 2018-04-16 RX ORDER — POTASSIUM CHLORIDE 7.45 MG/ML
10 INJECTION INTRAVENOUS
Status: DISCONTINUED | OUTPATIENT
Start: 2018-04-16 | End: 2018-04-18

## 2018-04-16 RX ORDER — SODIUM CHLORIDE, SODIUM LACTATE, POTASSIUM CHLORIDE, CALCIUM CHLORIDE 600; 310; 30; 20 MG/100ML; MG/100ML; MG/100ML; MG/100ML
INJECTION, SOLUTION INTRAVENOUS CONTINUOUS
Status: DISCONTINUED | OUTPATIENT
Start: 2018-04-16 | End: 2018-04-16 | Stop reason: HOSPADM

## 2018-04-16 RX ORDER — POTASSIUM CHLORIDE 29.8 MG/ML
20 INJECTION INTRAVENOUS
Status: DISCONTINUED | OUTPATIENT
Start: 2018-04-16 | End: 2018-04-18

## 2018-04-16 RX ORDER — FENTANYL CITRATE 50 UG/ML
INJECTION, SOLUTION INTRAMUSCULAR; INTRAVENOUS PRN
Status: DISCONTINUED | OUTPATIENT
Start: 2018-04-16 | End: 2018-04-16

## 2018-04-16 RX ORDER — PROCHLORPERAZINE 25 MG
25 SUPPOSITORY, RECTAL RECTAL EVERY 12 HOURS PRN
Status: DISCONTINUED | OUTPATIENT
Start: 2018-04-16 | End: 2018-04-19 | Stop reason: HOSPADM

## 2018-04-16 RX ORDER — ONDANSETRON 4 MG/1
4 TABLET, ORALLY DISINTEGRATING ORAL EVERY 30 MIN PRN
Status: DISCONTINUED | OUTPATIENT
Start: 2018-04-16 | End: 2018-04-16 | Stop reason: HOSPADM

## 2018-04-16 RX ORDER — IOPAMIDOL 755 MG/ML
104 INJECTION, SOLUTION INTRAVASCULAR ONCE
Status: COMPLETED | OUTPATIENT
Start: 2018-04-16 | End: 2018-04-16

## 2018-04-16 RX ORDER — NALOXONE HYDROCHLORIDE 0.4 MG/ML
.1-.4 INJECTION, SOLUTION INTRAMUSCULAR; INTRAVENOUS; SUBCUTANEOUS
Status: DISCONTINUED | OUTPATIENT
Start: 2018-04-16 | End: 2018-04-16

## 2018-04-16 RX ORDER — ONDANSETRON 2 MG/ML
4 INJECTION INTRAMUSCULAR; INTRAVENOUS ONCE
Status: COMPLETED | OUTPATIENT
Start: 2018-04-16 | End: 2018-04-16

## 2018-04-16 RX ADMIN — INSULIN ASPART 1 UNITS: 100 INJECTION, SOLUTION INTRAVENOUS; SUBCUTANEOUS at 16:20

## 2018-04-16 RX ADMIN — LIDOCAINE HYDROCHLORIDE 100 MG: 20 INJECTION, SOLUTION INFILTRATION; PERINEURAL at 11:01

## 2018-04-16 RX ADMIN — ACETAMINOPHEN 650 MG: 325 SOLUTION ORAL at 18:25

## 2018-04-16 RX ADMIN — SODIUM CHLORIDE, POTASSIUM CHLORIDE, SODIUM LACTATE AND CALCIUM CHLORIDE 1000 ML: 600; 310; 30; 20 INJECTION, SOLUTION INTRAVENOUS at 18:23

## 2018-04-16 RX ADMIN — Medication 0.2 MG: at 18:25

## 2018-04-16 RX ADMIN — MORPHINE SULFATE 2 MG: 2 INJECTION, SOLUTION INTRAMUSCULAR; INTRAVENOUS at 03:41

## 2018-04-16 RX ADMIN — FENTANYL CITRATE 100 MCG: 50 INJECTION, SOLUTION INTRAMUSCULAR; INTRAVENOUS at 11:03

## 2018-04-16 RX ADMIN — KETOROLAC TROMETHAMINE 30 MG: 30 INJECTION, SOLUTION INTRAMUSCULAR at 13:07

## 2018-04-16 RX ADMIN — IOPAMIDOL 104 ML: 755 INJECTION, SOLUTION INTRAVENOUS at 03:55

## 2018-04-16 RX ADMIN — SODIUM CHLORIDE, PRESERVATIVE FREE 76 ML: 5 INJECTION INTRAVENOUS at 03:55

## 2018-04-16 RX ADMIN — Medication 0.2 MG: at 21:35

## 2018-04-16 RX ADMIN — ONDANSETRON 4 MG: 2 INJECTION INTRAMUSCULAR; INTRAVENOUS at 13:03

## 2018-04-16 RX ADMIN — LIDOCAINE HYDROCHLORIDE 30 ML: 20 SOLUTION ORAL; TOPICAL at 01:49

## 2018-04-16 RX ADMIN — ONDANSETRON 4 MG: 2 INJECTION INTRAMUSCULAR; INTRAVENOUS at 04:06

## 2018-04-16 RX ADMIN — MORPHINE SULFATE 2 MG: 2 INJECTION, SOLUTION INTRAMUSCULAR; INTRAVENOUS at 16:14

## 2018-04-16 RX ADMIN — SODIUM CHLORIDE, POTASSIUM CHLORIDE, SODIUM LACTATE AND CALCIUM CHLORIDE 1000 ML: 600; 310; 30; 20 INJECTION, SOLUTION INTRAVENOUS at 10:02

## 2018-04-16 RX ADMIN — DEXAMETHASONE SODIUM PHOSPHATE 4 MG: 4 INJECTION, SOLUTION INTRA-ARTICULAR; INTRALESIONAL; INTRAMUSCULAR; INTRAVENOUS; SOFT TISSUE at 11:25

## 2018-04-16 RX ADMIN — SODIUM CHLORIDE 1000 ML: 9 INJECTION, SOLUTION INTRAVENOUS at 04:25

## 2018-04-16 RX ADMIN — MORPHINE SULFATE 2 MG: 2 INJECTION, SOLUTION INTRAMUSCULAR; INTRAVENOUS at 08:33

## 2018-04-16 RX ADMIN — Medication 0.2 MG: at 22:49

## 2018-04-16 RX ADMIN — ROCURONIUM BROMIDE 50 MG: 10 INJECTION INTRAVENOUS at 11:15

## 2018-04-16 RX ADMIN — PROPOFOL 20 MG: 10 INJECTION, EMULSION INTRAVENOUS at 12:43

## 2018-04-16 RX ADMIN — Medication 100 MG: at 11:01

## 2018-04-16 RX ADMIN — MORPHINE SULFATE 2 MG: 2 INJECTION, SOLUTION INTRAMUSCULAR; INTRAVENOUS at 06:32

## 2018-04-16 RX ADMIN — MIDAZOLAM 1 MG: 1 INJECTION INTRAMUSCULAR; INTRAVENOUS at 10:55

## 2018-04-16 RX ADMIN — FENTANYL CITRATE 150 MCG: 50 INJECTION, SOLUTION INTRAMUSCULAR; INTRAVENOUS at 11:00

## 2018-04-16 RX ADMIN — SODIUM CHLORIDE, POTASSIUM CHLORIDE, SODIUM LACTATE AND CALCIUM CHLORIDE: 600; 310; 30; 20 INJECTION, SOLUTION INTRAVENOUS at 11:10

## 2018-04-16 RX ADMIN — MIDAZOLAM 1 MG: 1 INJECTION INTRAMUSCULAR; INTRAVENOUS at 10:45

## 2018-04-16 RX ADMIN — INSULIN ASPART 1 UNITS: 100 INJECTION, SOLUTION INTRAVENOUS; SUBCUTANEOUS at 21:04

## 2018-04-16 RX ADMIN — SODIUM CHLORIDE, POTASSIUM CHLORIDE, SODIUM LACTATE AND CALCIUM CHLORIDE: 600; 310; 30; 20 INJECTION, SOLUTION INTRAVENOUS at 10:45

## 2018-04-16 RX ADMIN — PROPOFOL 100 MG: 10 INJECTION, EMULSION INTRAVENOUS at 11:01

## 2018-04-16 RX ADMIN — HYDROMORPHONE HYDROCHLORIDE 0.5 MG: 1 INJECTION, SOLUTION INTRAMUSCULAR; INTRAVENOUS; SUBCUTANEOUS at 12:26

## 2018-04-16 RX ADMIN — SUGAMMADEX 200 MG: 100 INJECTION, SOLUTION INTRAVENOUS at 13:11

## 2018-04-16 RX ADMIN — HYDROMORPHONE HYDROCHLORIDE 0.5 MG: 1 INJECTION, SOLUTION INTRAMUSCULAR; INTRAVENOUS; SUBCUTANEOUS at 12:01

## 2018-04-16 RX ADMIN — ERTAPENEM SODIUM 1 G: 1 INJECTION, POWDER, LYOPHILIZED, FOR SOLUTION INTRAMUSCULAR; INTRAVENOUS at 11:20

## 2018-04-16 RX ADMIN — MORPHINE SULFATE 2 MG: 2 INJECTION, SOLUTION INTRAMUSCULAR; INTRAVENOUS at 05:11

## 2018-04-16 RX ADMIN — SODIUM CHLORIDE 1000 ML: 9 INJECTION, SOLUTION INTRAVENOUS at 01:51

## 2018-04-16 ASSESSMENT — ENCOUNTER SYMPTOMS
SHORTNESS OF BREATH: 0
COUGH: 0
VOMITING: 1
FEVER: 0
NAUSEA: 1
DIARRHEA: 0
ABDOMINAL PAIN: 1

## 2018-04-16 NOTE — ED NOTES
[facility] acute [facility], [place]   ED Nurse to Floor Handoff     Andrew Lockwood is a 50 year old male who speaks Japanese and lives alone,  is homeless  They [facility] the ED [facility]    ED Chief Complaint: Abdominal Pain    ED Dx;   Final diagnoses:   Small bowel obstruction   Human immunodeficiency virus I infection (H)         Needed?: No    Allergies:   Allergies   Allergen Reactions     Metformin      Abdominal pain     Dulaglutide Rash   .  Past Medical Hx:   Past Medical History:   Diagnosis Date     AIDS (H)      Allergic rhinitis due to other allergen     DNS     Chronic abdominal pain      CNS toxoplasmosis (H)      Diabetes type 2, controlled (H)      GERD (gastroesophageal reflux disease)      HIV (human immunodeficiency virus infection) (H)      Periungual wart      Sleep apnea     doesn't use CPAP      Baseline Mental status: WDL  Current Mental Status changes: at basesline    Infection present or suspected this encounter: no  Sepsis suspected: No  Isolation type: No active isolations     Activity level - Baseline/Home:  Independent  Activity Level - Current:   Independent    Bariatric equipment needed?: No    In the ED these meds were given:   Medications   lidocaine (viscous) (XYLOCAINE) 2 % 15 mL, alum & mag hydroxide-simethicone (MYLANTA ES/MAALOX  ES) 15 mL GI Cocktail (30 mLs Oral Given 4/16/18 0149)   0.9% sodium chloride BOLUS (0 mLs Intravenous Stopped 4/16/18 0319)   morphine (PF) injection 2 mg (2 mg Intravenous Given 4/16/18 0341)   iopamidol (ISOVUE-370) solution 104 mL (104 mLs Intravenous Given 4/16/18 0355)   sodium chloride 0.9 % bag 500mL for CT scan flush use (76 mLs Intravenous Given 4/16/18 0355)   ondansetron (ZOFRAN) injection 4 mg (4 mg Intravenous Given 4/16/18 0406)   0.9% sodium chloride BOLUS (0 mLs Intravenous Stopped 4/16/18 0518)   morphine (PF) injection 2 mg (2 mg Intravenous Given 4/16/18 0511)       Drips running?   No    Home pump  No    Current LDAs  Peripheral IV 04/16/18 Right Lower forearm (Active)   Line Status Saline locked 4/16/2018  3:28 AM   Infiltration Scale 0 4/16/2018  3:28 AM   Infiltration Site Treatment Method  None 4/16/2018  3:28 AM   Extravasation? No 4/16/2018  3:28 AM   Number of days:0       NG/OG Tube Nasogastric 14 fr Left nostril (Active)   Number of days:0       Incision/Surgical Site 01/31/18 Umbilicus (Active)   Number of days:75       Incision/Surgical Site 01/31/18 Left;Lower Abdomen (Active)   Number of days:75       Labs results:   Labs Ordered and Resulted from Time of ED Arrival Up to the Time of Departure from the ED   GLUCOSE BY METER - Abnormal; Notable for the following:        Result Value    Glucose 139 (*)     All other components within normal limits   CBC WITH PLATELETS DIFFERENTIAL - Abnormal; Notable for the following:     WBC 12.0 (*)     All other components within normal limits   COMPREHENSIVE METABOLIC PANEL - Abnormal; Notable for the following:     Potassium 3.3 (*)     Glucose 130 (*)     All other components within normal limits   LIPASE   LACTIC ACID WHOLE BLOOD   ROUTINE UA WITH MICROSCOPIC   NASOGASTRIC TUBE DECOMPRESSION       Imaging Studies:   Recent Results (from the past 24 hour(s))   CT Abdomen Pelvis w Contrast   Result Value    Radiologist flags Small bowel obstruction, closed loop (Urgent)    Narrative    PRELIMINARY REPORT - The following report is a preliminary  interpretation.   1. Closed loop small bowel obstruction with transition points in the  right midabdomen and left lower quadrant. Dilated loop of bowel  measures up to 4.5 cm maximum diameter without evidence of ischemia or  pneumatosis.  2. Removal of percutaneous right lower quadrant drain with near  complete resolution of intra-abdominal abscess when compared to  2/10/2018. Trace residual free fluid in the right paracolic gutter and  along the anterior abdomen.  3. Near complete resolution of  opacities in the left lower lobe when  compared to 2/10/2018, now with minimal residual groundglass  opacities, likely representing resolving infectious/inflammatory  etiology.    [Urgent Result: Small bowel obstruction, closed loop]    Finding was identified on 4/16/2018 4:01 AM.     Dr. Campos was contacted by Dr. Mcadams at 4/16/2018 4:06 AM and  verbalized understanding of the urgent finding.            Recent vital signs:   /85  Pulse 71  Temp 97.7  F (36.5  C) (Oral)  Wt 77.1 kg (170 lb)  SpO2 98%  BMI 29.18 kg/m2    Cardiac Rhythm: Other  Pt needs tele? No  Skin/wound Issues: full skin exam not done. pt reports no issues with his skin other than a rash that he got from trulicity. he is not on trulicity anymore    Code Status: Full Code      Pain control: good    Nausea control: fair    Abnormal labs/tests/findings requiring intervention: pt's CT shows a closed loop SBO. Pt has an NG placed by nursing at 63 cm at the left nostril, verified by XRAY. Pt is connected to low intermittent suction. Given morphine 2 mg twice for pain and 4 mg zofran for vomiting.    Family present during ED course? no  Family Comments/Social Situation comments: pt is homeless. He has a history of HIV, diabetes, and appendicitis. He thought this abdominal pain was related to the starting of a new med Januvia. However, it is not the case. He says his appetite has been diminished.    Tasks needing completion: none    Reema Mercado RN  [facility]-- [phone] [facility]  [phone] [facility] ED

## 2018-04-16 NOTE — ED PROVIDER NOTES
History     Chief Complaint   Patient presents with     Abdominal Pain     HPI  Andrew Lockwood is a 50 year old male with a complicated PMH, including HIV, diabetes, relatively recent appendectomy complicated by postoperative abscess, amongst others, who presents to the emergency department with a complaint of several hours of central abdominal cramping as well as nausea.  He states that he has vomited about 3 times, nonbloody emesis.  He had one normal bowel movement tonight, no diarrhea.  No fevers, cough, shortness of breath.  He states that he has been taking Januvia for 2 days for his blood sugars, recently switched from Trulicity secondary to developing a rash on Trulicity.  He states that he had similar abdominal cramping when he was previously taking metformin, which resolved when he was switched to Trulicity. He states that this feels different than when he had his abscess.  He believes that his symptoms are related to the Januvia.  He denies other specific complaints at this time.    Past Medical History:   Diagnosis Date     AIDS (H)      Allergic rhinitis due to other allergen     DNS     Chronic abdominal pain      CNS toxoplasmosis (H)      Diabetes type 2, controlled (H)      GERD (gastroesophageal reflux disease)      HIV (human immunodeficiency virus infection) (H)      Periungual wart      Sleep apnea     doesn't use CPAP       Past Surgical History:   Procedure Laterality Date     C NONSPECIFIC PROCEDURE      right forearm fracture     COLONOSCOPY Left 1/22/2016    Procedure: COMBINED COLONOSCOPY, SINGLE OR MULTIPLE BIOPSY/POLYPECTOMY BY BIOPSY;  Surgeon: Clark Saini MD;  Location: [facility]     HC EXPLORE UNDESC TESTIS,INGUIN/SCROTAL       LAPAROSCOPIC APPENDECTOMY N/A 1/31/2018    Procedure: LAPAROSCOPIC APPENDECTOMY;  LAPAROSCOPIC APPENDECTOMY;  Surgeon: Dawn Holt MD;  Location: [facility]     LAPAROSCOPY DIAGNOSTIC (GENERAL) N/A 7/26/2016    Procedure: LAPAROSCOPY  DIAGNOSTIC (GENERAL);  Surgeon: Susannah Arriaga MD;  Location: [facility]     OPTICAL TRACKING SYSTEM CRANIOTOMY, EXCISE TUMOR, COMBINED Left 4/10/2015    Procedure: COMBINED OPTICAL TRACKING SYSTEM CRANIOTOMY, EXCISE TUMOR;  Surgeon: Mirlande Colmenares MD;  Location: [facility]     REPAIR GAMEKEEPER'S THUMB Right 12/2/2016    Procedure: REPAIR LIGAMENT ULNAR COLLATERAL THUMB (GAMEKEEPER'S);  Surgeon: Evin Zamorano MD;  [facility]       Family History   Problem Relation Age of Onset     DIABETES Brother      DIABETES Father      Alzheimer Disease Father      Unknown/Adopted Mother      DIABETES Paternal Grandfather      CANCER No family hx of      no skin cancer     Skin Cancer No family hx of      no famiy hx of skin cancer     Glaucoma No family hx of      Macular Degeneration No family hx of        Social History   Substance Use Topics     Smoking status: Current Every Day Smoker     Packs/day: 0.20     Last attempt to quit: 10/28/2016     Smokeless tobacco: Former User      Comment: 4 cigarettes     Alcohol use No      Comment: Last etoh in 2007         I have reviewed the Medications, Allergies, Past Medical and Surgical History, and Social History in the Epic system.    Review of Systems   Constitutional: Negative for fever.   Respiratory: Negative for cough and shortness of breath.    Cardiovascular: Negative for chest pain.   Gastrointestinal: Positive for abdominal pain, nausea and vomiting. Negative for diarrhea.   All other systems reviewed and are negative.      Physical Exam   BP: (!) 139/101  Pulse: 71  Temp: 97.7  F (36.5  C)  Weight: 77.1 kg (170 lb) (no scale bed)  SpO2: 92 %      Physical Exam   Constitutional: He appears distressed.   HENT:   Head: Atraumatic.   Mouth/Throat: Oropharynx is clear and moist. No oropharyngeal exudate.   Eyes: Pupils are equal, round, and reactive to light. No scleral icterus.   Cardiovascular: Normal heart sounds and intact distal pulses.     Pulmonary/Chest: Breath sounds normal. No respiratory distress.   Abdominal: Soft. There is tenderness (Mild tenderness with palpation of the epigastrium, without guarding).   Slightly increased bowel sounds   Musculoskeletal: He exhibits no edema or tenderness.   Skin: Skin is warm. No rash noted. He is not diaphoretic.       ED Course     ED Course     Procedures             Critical Care time:  none             Labs Ordered and Resulted from Time of ED Arrival Up to the Time of Departure from the ED   GLUCOSE BY METER - Abnormal; Notable for the following:        Result Value    Glucose 139 (*)     All other components within normal limits   CBC WITH PLATELETS DIFFERENTIAL - Abnormal; Notable for the following:     WBC 12.0 (*)     All other components within normal limits   COMPREHENSIVE METABOLIC PANEL - Abnormal; Notable for the following:     Potassium 3.3 (*)     Glucose 130 (*)     All other components within normal limits   LIPASE   LACTIC ACID WHOLE BLOOD   ROUTINE UA WITH MICROSCOPIC   NASOGASTRIC TUBE DECOMPRESSION            Assessments & Plan (with Medical Decision Making)   The patient had his CD4 count checked most recently on 3/7/2018, and it was 256.  He states he is compliant with all of his medications.  He did have his gangrenous appendix removed on 131, and in February had an abdominal abscess postoperatively.  He states this feels nothing like his symptoms at that time. White blood cell count was mildly elevated.  The patient requested a GI cocktail, though stated it did not help.  Because of this, I did recommend a CT, especially in light of his recent postoperative abdominal abscess.  CT was done, and unfortunately he appears to have a closed loop bowel obstruction.  General surgery was consulted, will admit him to their service.  They have requested an NG tube.  They state that they will monitor for short period of time and will have a low threshold to take him to the OR.  No clear  evidence for bowel ischemia on CT.    Dictation Disclaimer: Some of this Note has been completed with voice-recognition dictation software. Although errors are generally corrected real-time, there is the potential for a rare error to be present in the completed chart.      I have reviewed the nursing notes.    I have reviewed the findings, diagnosis, plan and need for follow up with the patient.    New Prescriptions    No medications on file       Final diagnoses:   Small bowel obstruction   Human immunodeficiency virus I infection (H)       4/16/2018   [facility], [place], EMERGENCY DEPARTMENT     Dawn Yan MD  04/16/18 0638

## 2018-04-16 NOTE — IP AVS SNAPSHOT
MRN:[id number]                      After Visit Summary   4/16/2018    Andrew Lockwood    MRN: [id number]           Thank you!     Thank you for choosing [facility] for your care. Our goal is always to provide you with excellent care. Hearing back from our patients is one way we can continue to improve our services. Please take a few minutes to complete the written survey that you may receive in the mail after you visit with us. Thank you!        Patient Information     Date Of Birth          11/27/1967        About your hospital stay     You were admitted on:  April 16, 2018 You last received care in the:  [address] [facility]    You were discharged on:  April 19, 2018        Reason for your hospital stay       You were hospitalized following small bowel obstruction.                  Who to Call     For medical emergencies, please call 911.  For non-urgent questions about your medical care, please call your primary care provider or clinic, [phone]  For questions related to your surgery, please call your surgery clinic        Attending Provider     Provider Specialty    Dawn Yan MD Emergency Medicine    Devin, Ingrid Rios MD General Surgery    Prince Dowling MD General Surgery       Primary Care Provider Office Phone # Fax #    Rubio Cronin [phone]      After Care Instructions     Discharge Instructions       Discharge Instructions  Activity  - No strenuous exercise for 4-6 weeks  - No lifting, pushing, pulling more than 10 pounds for 4-6 weeks  - Do not operate a motor vehicle while taking narcotic pain medications    Incisions  - You may shower and get incisions wet starting 24 hrs after surgery  - Do not scrub incisions or submerge wounds (e.g. bath, pool, hot tub, etc.) for 2 weeks or until the glue or steri-strips have come off    Drain Care  - You do not have a drain.  Specific care/instructions:  Not Applicable    Medications  - Take  tylenol as needed for pain, you may alternate ibuprofen and tylenol if you prefer.  Use Oxycodone for breakthrough pain, if needed.  - Do not take more than 4,000mg of acetaminophen in any 24 hour period, as this can cause liver damage  - Take stool softeners such as Senna or Miralax while you are using narcotics, but stop if you develop diarrhea  - Wean yourself off of narcotic pain medications    Follow-Up:  - Call your primary care provider to touch base regarding your recent admission.     - Call or return sooner than your regularly scheduled visit if you develop any of the following: fever >101.5, uncontrolled pain, uncontrolled nausea or vomiting, as well as increased redness, swelling, or drainage from your wound.   -  A nurse from the General Surgery Clinic will contact you 24 hours, or next business day, after your discharge from the hospital.  If you have questions or to schedule a follow up appointment please call the General Surgery Clinic at [phone].  Call [phone] and ask to speak with the Surgery resident on call if you are having troubles in the evenings, at night, or on the weekend.  -  If you are receiving home care please inform your home care nurse of our contact number.                  Follow-up Appointments     Adult [facility] Follow-up and recommended labs and tests       Follow up with primary care provider, Rubio Cronin, within 2 weeks, to evaluate after surgery and for hospital follow- up. No follow up labs or test are needed. 5/1/18 at 3:40pm with Dr. Haq  Follow up with Dr. Dowling , at general surgery clinic, in 10-14 days  to evaluate after surgery. No follow up labs or test are needed.    Appointments on [facility] and/or [facility] (with [facility] or [facility] provider or service). Call [phone] if you haven't heard regarding these appointments within 7 days of discharge.                  Your next 10 appointments already scheduled     May 01, 2018  3:40 PM CDT   (Arrive by  3:25 PM)   Return Visit with Dawn Haq MD   [facility] ([facility])    [address]  [address]th [address]  [facility] [address] [phone]              Pending Results     No orders found from 4/14/2018 to 4/17/2018.            Statement of Approval     Ordered          04/19/18 0628  I have reviewed and agree with all the recommendations and orders detailed in this document.  EFFECTIVE NOW     Approved and electronically signed by:  Tosin Wolff MD             Admission Information     Date & Time Provider Department Dept. Phone    4/16/2018 Prince Dowling MD [address] [facility] [phone]      Your Vitals Were     Blood Pressure Pulse Temperature Respirations Weight Pulse Oximetry    126/94 (BP Location: Right arm) 85 98.2  F (36.8  C) (Oral) 16 78.2 kg (172 lb 8 oz) 98%    BMI (Body Mass Index)                   29.61 kg/m2           [employer] Information     ki work gives you secure access to your electronic health record. If you see a primary care provider, you can also send messages to your care team and make appointments. If you have questions, please call your primary care clinic.  If you do not have a primary care provider, please call [phone] and they will assist you.        Care EveryWhere ID     This is your Care EveryWhere ID. This could be used by other organizations to access your [facility] medical records  [id number]        Equal Access to Services     RA JORDAN : Hadii carissa Cody, maverick centeno, qaybta dez mcdowell . So [facility] [phone].    ATENCIÓN: Si habla español, tiene a kohli disposición servicios gratuitos de asistencia lingüística. Llame al [phone].    We comply with applicable federal civil rights laws and [place] laws. We do not discriminate on the basis of race, color, national origin, age, disability, sex, sexual orientation, or gender  identity.               Review of your medicines      START taking        Dose / Directions    oxyCODONE IR 5 MG tablet   Commonly known as:  ROXICODONE   Used for:  Small bowel obstruction        Dose:  5 mg   Take 1 tablet (5 mg) by mouth every 4 hours as needed for moderate to severe pain   Quantity:  15 tablet   Refills:  0       senna 8.6 MG tablet   Commonly known as:  SENOKOT   Used for:  Small bowel obstruction        Dose:  1 tablet   Take 1 tablet by mouth daily as needed for constipation   Quantity:  10 tablet   Refills:  0         CONTINUE these medicines which may have CHANGED, or have new prescriptions. If we are uncertain of the size of tablets/capsules you have at home, strength may be listed as something that might have changed.        Dose / Directions    acetaminophen 325 MG tablet   Commonly known as:  TYLENOL   This may have changed:    - how much to take  - when to take this  - reasons to take this   Used for:  Abdominal abscess (H)        Dose:  325-650 mg   Take 1-2 tablets (325-650 mg) by mouth every 6 hours as needed for mild pain   Quantity:  60 tablet   Refills:  0         CONTINUE these medicines which have NOT CHANGED        Dose / Directions    alum & mag hydroxide-simethicone 400-400-40 MG/5ML Susp suspension   Commonly known as:  MAALOX ADVANCED MAX ST        Dose:  30 mL   Take 30 mLs by mouth every 4 hours as needed for indigestion   Quantity:  355 mL   Refills:  0       blood glucose lancets standard   Commonly known as:  no brand specified   Used for:  Type 2 diabetes mellitus without complication, without long-term current use of insulin (H)        Use to test blood sugar four times daily or as directed.   Quantity:  100 Box   Refills:  5       * blood glucose monitoring meter device kit   Commonly known as:  no brand specified   Used for:  Type 2 diabetes mellitus without complication, without long-term current use of insulin (H)        Use to test blood sugar four times daily  or as directed.   Quantity:  1 kit   Refills:  0       * blood glucose monitoring meter device kit   Commonly known as:  no brand specified   Used for:  Type 2 diabetes mellitus with complication, without long-term current use of insulin (H)        Use to test blood sugar 4 times daily or as directed.   Quantity:  1 kit   Refills:  0       * blood glucose monitoring test strip   Commonly known as:  no brand specified   Used for:  Type 2 diabetes mellitus without complication, without long-term current use of insulin (H)        Use to test blood sugars four times daily or as directed   Quantity:  100 each   Refills:  5       * blood glucose monitoring test strip   Commonly known as:  no brand specified   Used for:  Type 2 diabetes mellitus with complication, without long-term current use of insulin (H)        Dose:  1 strip   1 strip by In Vitro route 4 times daily (before meals and nightly) Use to test blood sugars 4 times daily or as directed   Quantity:  100 strip   Refills:  11       Pvhlgyl-Zicfj-Hkjhyfsh-TenofAF 306-123-298-10 MG Tabs per tablet   Commonly known as:  GENVOYA   Indication:  Positive HIV Test   Used for:  AIDS (acquired immune deficiency syndrome) (H)        Dose:  1 tablet   Take 1 tablet by mouth daily (with breakfast)   Quantity:  30 tablet   Refills:  0       fluticasone 50 MCG/ACT spray   Commonly known as:  FLONASE   Used for:  Acute otitis media, unspecified otitis media type        Dose:  2 spray   Spray 2 sprays into both nostrils 2 times daily   Quantity:  9.9 mL   Refills:  3       ibuprofen 600 MG tablet   Commonly known as:  ADVIL/MOTRIN   Used for:  OME (otitis media with effusion), left        Dose:  600 mg   Take 1 tablet (600 mg) by mouth every 8 hours as needed for moderate pain   Quantity:  60 tablet   Refills:  0       insulin pen needle 31G X 8 MM   Commonly known as:  B-D U/F   Used for:  Type 2 diabetes mellitus without complication, without long-term current use of insulin  (H)        Use 1 pen needles daily or as directed.   Quantity:  100 each   Refills:  0       omeprazole 20 MG CR capsule   Commonly known as:  priLOSEC   Used for:  Epigastric pain        TAKE ONE CAPSULE BY MOUTH EVERY DAY   Quantity:  90 capsule   Refills:  1       simethicone 125 MG Chew chewable tablet   Commonly known as:  GAS-X EXTRA STRENGTH   Used for:  Gas pain        Dose:  125 mg   Take 1 tablet (125 mg) by mouth 2 times daily   Quantity:  120 tablet   Refills:  0       sitagliptin 50 MG tablet   Commonly known as:  JANUVIA   Used for:  Type 2 diabetes mellitus with hyperglycemia, without long-term current use of insulin (H)        Dose:  50 mg   Take 1 tablet (50 mg) by mouth daily   Quantity:  90 tablet   Refills:  3       * Notice:  This list has 4 medication(s) that are the same as other medications prescribed for you. Read the directions carefully, and ask your doctor or other care provider to review them with you.         Where to get your medicines      These medications were sent to [address] [address]     Phone:  [phone]     acetaminophen 325 MG tablet    senna 8.6 MG tablet         Some of these will need a paper prescription and others can be bought over the counter. Ask your nurse if you have questions.     Bring a paper prescription for each of these medications     oxyCODONE IR 5 MG tablet                Protect others around you: Learn how to safely use, store and throw away your medicines at www.disposemymeds.org.        Information about OPIOIDS     PRESCRIPTION OPIOIDS: WHAT YOU NEED TO KNOW   You have a prescription for an opioid (narcotic) pain medicine. Opioids can cause addiction. If you have a history of chemical dependency of any type, you are at a higher risk of becoming addicted to opioids. Only take this medicine after all other options have been tried. Take it for as short a time  and as few doses as possible.     Do not:    Drive. If you drive while taking these medicines, you could be arrested for driving under the influence (DUI).    Operate heavy machinery    Do any other dangerous activities while taking these medicines.     Drink any alcohol while taking these medicines.      Take with any other medicines that contain acetaminophen. Read all labels carefully. Look for the word  acetaminophen  or  Tylenol.  Ask your pharmacist if you have questions or are unsure.    Store your pills in a secure place, locked if possible. We will not replace any lost or stolen medicine. If you don t finish your medicine, please throw away (dispose) as directed by your pharmacist. The [place] Pollution Control Agency has more information about safe disposal: https://www.pca.[facility].mn.us/living-green/managing-unwanted-medications    All opioids tend to cause constipation. Drink plenty of water and eat foods that have a lot of fiber, such as fruits, vegetables, prune juice, apple juice and high-fiber cereal. Take a laxative (Miralax, milk of magnesia, Colace, Senna) if you don t move your bowels at least every other day.              Medication List: This is a list of all your medications and when to take them. Check marks below indicate your daily home schedule. Keep this list as a reference.      Medications           Morning Afternoon Evening Bedtime As Needed    acetaminophen 325 MG tablet   Commonly known as:  TYLENOL   Take 1-2 tablets (325-650 mg) by mouth every 6 hours as needed for mild pain   Last time this was given:  650 mg on 4/19/2018  3:47 AM                                alum & mag hydroxide-simethicone 400-400-40 MG/5ML Susp suspension   Commonly known as:  MAALOX ADVANCED MAX ST   Take 30 mLs by mouth every 4 hours as needed for indigestion   Last time this was given:  15 mLs on 4/16/2018  1:49 AM                                blood glucose lancets standard   Commonly known as:  no brand  specified   Use to test blood sugar four times daily or as directed.                                * blood glucose monitoring meter device kit   Commonly known as:  no brand specified   Use to test blood sugar four times daily or as directed.                                * blood glucose monitoring meter device kit   Commonly known as:  no brand specified   Use to test blood sugar 4 times daily or as directed.                                * blood glucose monitoring test strip   Commonly known as:  no brand specified   Use to test blood sugars four times daily or as directed                                * blood glucose monitoring test strip   Commonly known as:  no brand specified   1 strip by In Vitro route 4 times daily (before meals and nightly) Use to test blood sugars 4 times daily or as directed                                Wkdqdbr-Bttyg-Koubekov-TenofAF 533-280-041-10 MG Tabs per tablet   Commonly known as:  GENVOYA   Take 1 tablet by mouth daily (with breakfast)   Last time this was given:  1 tablet on 4/19/2018  8:04 AM                                fluticasone 50 MCG/ACT spray   Commonly known as:  FLONASE   Spray 2 sprays into both nostrils 2 times daily                                ibuprofen 600 MG tablet   Commonly known as:  ADVIL/MOTRIN   Take 1 tablet (600 mg) by mouth every 8 hours as needed for moderate pain                                insulin pen needle 31G X 8 MM   Commonly known as:  B-D U/F   Use 1 pen needles daily or as directed.                                omeprazole 20 MG CR capsule   Commonly known as:  priLOSEC   TAKE ONE CAPSULE BY MOUTH EVERY DAY                                oxyCODONE IR 5 MG tablet   Commonly known as:  ROXICODONE   Take 1 tablet (5 mg) by mouth every 4 hours as needed for moderate to severe pain   Last time this was given:  5 mg on 4/17/2018 11:01 PM                                senna 8.6 MG tablet   Commonly known as:  SENOKOT   Take 1 tablet by  mouth daily as needed for constipation                                simethicone 125 MG Chew chewable tablet   Commonly known as:  GAS-X EXTRA STRENGTH   Take 1 tablet (125 mg) by mouth 2 times daily                                sitagliptin 50 MG tablet   Commonly known as:  JANUVIA   Take 1 tablet (50 mg) by mouth daily                                * Notice:  This list has 4 medication(s) that are the same as other medications prescribed for you. Read the directions carefully, and ask your doctor or other care provider to review them with you.

## 2018-04-16 NOTE — OP NOTE
Procedure Date: 04/16/2018      DATE OF SERVICE: 04/16/2018      PREOPERATIVE DIAGNOSIS:  Small bowel obstruction and possible closed loop.      POSTOPERATIVE DIAGNOSIS:  Multiple adhesions with evidence of bowel obstruction.      STAFF:  Prince Dowling MD      RESIDENT SURGEON:  Mainor De Leon MD      CLINICAL HISTORY:  Andrew Lockwood is a 50-year-old male well known to the Surgery Service who presents following appendectomy complicated by pericolic abscess.  The patient now presents with obstructive symptoms.  The patient has had abdominal pain and elevated white count. A CT scan demonstrated findings consistent with possible closed loop obstruction.  The patient was given full informed consent regarding the risks and benefits of surgery including the potential for injury to intraabdominal organs,  the potential for bleeding, need for blood transfusion and all the attended blood transfusion risk. The patient was also informed of possible bowel resection and the need for second surgery.  The patient also understood the risks of general anesthetic including myocardial infarction, pulmonary emboli, stroke and even death in the operating room.  The patient understood these risks and wished to proceed.      DESCRIPTION OF PROCEDURE:  Andrew Lockwood was taken to the main operating room, placed supine on the operating table.  Following appropriate timeout procedure to assure patient identification and procedure, a Veress needle was introduced the left upper quadrant of the abdomen and 2 cm inferior to the left costal margin. The Veress needle was introduce in the abdominal cavity a pneumoperitoneum was achieved and a 5 mm laparoscopic port was placed in the abdominal cavity through a 5 mm incision.  Laparoscopic inspection demonstrated multiple loops of dilated small bowel, which were erythematous. The remainder of the abdominal cavity remains unremarkable.  A second 5-mm port was placed at the umbilicus and a third was  placed in the left lower quadrant.  Under laparoscopic vision each of the port sites were infiltrated with 10 mL of 1% lidocaine mixed 50:50 with 0.25% Marcaine.  The patient was placed with the feet elevated, had inferior and Trendelenburg position and rotated towards the patient's left side down, right side up.  The abdominal cavity was carefully inspected.  Multiple loops of large bowel were examined carefully.  There is no evidence of large bowel obstruction or abnormality and the small bowel dilated loops were carefully inspected.  Multiple adhesions were present in the right lower quadrant and right over the ascending area of the ascending colon.  These adhesions were taken down with the EndoShears scissor.  Hemostasis was excellent.  The small intestine was carefully inspected from the terminal ileum to the ligament of Treitz.  This was performed twice and the small intestine demonstrated areas of dilatation and normal caliber bowel.  There was no specific single point of transition, however, multiple areas of dilation to small bowel and large bowel were identified.  The abdomen was otherwise unremarkable.  After the lysis of adhesions and complete inspection of small bowel, large bowel, the gallbladder was carefully inspected.  The spleen and stomach were inspected.  No other abnormalities were noted.  The pneumoperitoneum was resolved and all of the areas of adhesiolysis were irrigated with sterile normal saline, which returned clear.  The pneumoperitoneum was again resolved and the port sites were removed.  The skin edges were then reapproximated with interrupted 4-0 Monocryl, and skin sealant.  The patient tolerated the procedure well.  Needle and sponge count correct x2 and the patient was returned to postanesthesia recovery in good condition.         KEIRY WELDON MD             D: 04/16/2018   T: 04/16/2018   MT: TIM      Name:     BREE ROSAS   MRN:      [id number]        Account:         [id number]   :      1967           Procedure Date: 2018      Document: [id number]       cc: [facility] Billing

## 2018-04-16 NOTE — BRIEF OP NOTE
[facility] [place]    Brief Operative Note    Pre-operative diagnosis: Small bowel obstruction  Post-operative diagnosis Dilated segment of small bowel  Procedure: Procedure(s):  Diagnostic laparoscopy and lysis of adhesions - Wound Class: I-Clean  Surgeon: Surgeon(s) and Role:     * Prince Dowling MD - Primary     * Mainor De Leon MD - Resident - Assisting  Anesthesia: General   Estimated blood loss: 5 cc  Drains: None  Specimens: * No specimens in log *  Findings:   Dilated segment of bowel unrelated to the adhesive disease  Complications: None.  Implants:  None.

## 2018-04-16 NOTE — H&P
General Surgery History and Physical     Andrew Lockwood MRN# [id number]   YOB: 1967 Age: 50 year old      Date of Admission:  4/16/2018        Chief Complaint:   Abdominal pain         History of Present Illness:   50 year old man with history of HIV, DM II, and appendectomy complicated by abscess requiring drain placement now presenting with abdominal pain.  Pain began last evening around 1800 after eating dinner.  Described as crampy pain.  Located in mid abdomen.  Endorses nausea and vomiting x2.  He denies fevers.  His last BM was approximately 3 hours ago.  He states that this pain is quite common for him.  He thinks in the past it has been secondary to metformin which he has now stopped.  He also states that he has required and NG tube in the past for several days although the details are somewhat unclear.      Past Medical History:  Past Medical History:   Diagnosis Date     AIDS (H)      Allergic rhinitis due to other allergen     DNS     Chronic abdominal pain      CNS toxoplasmosis (H)      Diabetes type 2, controlled (H)      GERD (gastroesophageal reflux disease)      HIV (human immunodeficiency virus infection) (H)      Periungual wart      Sleep apnea     doesn't use CPAP       Past Surgical History:  Past Surgical History:   Procedure Laterality Date     C NONSPECIFIC PROCEDURE      right forearm fracture     COLONOSCOPY Left 1/22/2016    Procedure: COMBINED COLONOSCOPY, SINGLE OR MULTIPLE BIOPSY/POLYPECTOMY BY BIOPSY;  Surgeon: Clark Siani MD;  Location: [facility]     HC EXPLORE UNDESC TESTIS,INGUIN/SCROTAL       LAPAROSCOPIC APPENDECTOMY N/A 1/31/2018    Procedure: LAPAROSCOPIC APPENDECTOMY;  LAPAROSCOPIC APPENDECTOMY;  Surgeon: Dawn Holt MD;  Location:  OR     LAPAROSCOPY DIAGNOSTIC (GENERAL) N/A 7/26/2016    Procedure: LAPAROSCOPY DIAGNOSTIC (GENERAL);  Surgeon: Susannah Arriaga MD;  Location:  OR     OPTICAL TRACKING SYSTEM  CRANIOTOMY, EXCISE TUMOR, COMBINED Left 4/10/2015    Procedure: COMBINED OPTICAL TRACKING SYSTEM CRANIOTOMY, EXCISE TUMOR;  Surgeon: Mirlande Colmenares MD;  Location: [facility]     REPAIR GAMEKEEPER'S THUMB Right 12/2/2016    Procedure: REPAIR LIGAMENT ULNAR COLLATERAL THUMB (GAMEKEEPER'S);  Surgeon: Evin Zamorano MD;  Location: [facility]       Allergies:     Allergies   Allergen Reactions     Metformin      Abdominal pain     Dulaglutide Rash       Medications:    No current facility-administered medications on file prior to encounter.   Current Outpatient Prescriptions on File Prior to Encounter:  sitagliptin (JANUVIA) 50 MG tablet Take 1 tablet (50 mg) by mouth daily   omeprazole (PRILOSEC) 20 MG CR capsule TAKE ONE CAPSULE BY MOUTH EVERY DAY   fluticasone (FLONASE) 50 MCG/ACT spray Spray 2 sprays into both nostrils 2 times daily   GENVOYA [id number] mg tablet Take 1 tablet by mouth daily (with breakfast)   ibuprofen (ADVIL/MOTRIN) 600 MG tablet Take 1 tablet (600 mg) by mouth every 8 hours as needed for moderate pain   simethicone (GAS-X EXTRA STRENGTH) 125 MG CHEW chewable tablet Take 1 tablet (125 mg) by mouth 2 times daily   insulin pen needle (B-D U/F) 31G X 8 MM Use 1 pen needles daily or as directed.   acetaminophen (TYLENOL) 325 MG tablet Take 2 tablets (650 mg) by mouth every 4 hours   alum & mag hydroxide-simethicone (MAALOX ADVANCED MAX ST) 400-400-40 MG/5ML SUSP suspension Take 30 mLs by mouth every 4 hours as needed for indigestion   blood glucose monitoring (NO BRAND SPECIFIED) meter device kit Use to test blood sugar 4 times daily or as directed.   blood glucose monitoring (NO BRAND SPECIFIED) test strip 1 strip by In Vitro route 4 times daily (before meals and nightly) Use to test blood sugars 4 times daily or as directed   blood glucose monitoring (NO BRAND SPECIFIED) meter device kit Use to test blood sugar four times daily or as directed.   blood glucose monitoring (NO BRAND SPECIFIED)  "test strip Use to test blood sugars four times daily or as directed   blood glucose (NO BRAND SPECIFIED) lancets standard Use to test blood sugar four times daily or as directed.       Social History:  Social History     Social History     Marital status: Single     Spouse name: N/A     Number of children: N/A     Years of education: N/A     Occupational History           5 years; temp agency     Social History Main Topics     Smoking status: Current Every Day Smoker     Packs/day: 0.20     Last attempt to quit: 10/28/2016     Smokeless tobacco: Former User      Comment: 4 cigarettes     Alcohol use No      Comment: Last etoh in 2007     Drug use: No      Comment: \"Not very often\"     Sexual activity: Not Currently     Partners: Female, Male      Comment: Last sexual activity 2008     Other Topics Concern     Not on file     Social History Narrative    Born in [facility].  Came to the [place] in 1993.  Last traveled to visit family in 2008.            Family History:  Family History   Problem Relation Age of Onset     DIABETES Brother      DIABETES Father      Alzheimer Disease Father      Unknown/Adopted Mother      DIABETES Paternal Grandfather      CANCER No family hx of      no skin cancer     Skin Cancer No family hx of      no famiy hx of skin cancer     Glaucoma No family hx of      Macular Degeneration No family hx of        ROS:  The remainder of the complete ROS was negative unless noted in the HPI.    Exam:  /85  Pulse 71  Temp 97.7  F (36.5  C) (Oral)  Wt 77.1 kg (170 lb)  SpO2 98%  BMI 29.18 kg/m2  General: Alert, interactive, NAD  Resp: Non-labored breathing  Cardiac: regular rate and rhythm  Abdomen: soft and non-distended.  Moderately tender to palpation over mid abdomen although no signs of peritonitis.      Labs:  WBC: 12.0  Lactate pending    Imaging:  CT A/P  PRELIMINARY REPORT - The following report is a preliminary  interpretation.   1. Closed loop small bowel " obstruction with transition points in the  right midabdomen and left lower quadrant. Dilated loop of bowel  measures up to 4.5 cm maximum diameter without evidence of ischemia or  pneumatosis.  2. Removal of percutaneous right lower quadrant drain with near  complete resolution of intra-abdominal abscess when compared to  2/10/2018. Trace residual free fluid in the right paracolic gutter and  along the anterior abdomen.  3. Near complete resolution of opacities in the left lower lobe when  compared to 2/10/2018, now with minimal residual groundglass  opacities, likely representing resolving infectious/inflammatory  etiology.    Assessment/ Plan:  50 year old man with history of HIV, DM2, and appendectomy c/b abscess ,managed with drain presenting with small bowel obstruction.  At this point I have little concern on exam, imaging, and lab work for ischemic bowel although with potential closed loop obstruction, he will require extra vigilance.  I think we can start with NG decompression of a full stomach and see if his pain improves.  Would have a low threshold for at least laparoscopy if pain not improving.    - Admit to general surgery service  - NG decompression to low intermittent suction  - NPO  - IVF @ 150 for now to assure adequate hydration  - Serial abdominal exams  - SSI  - Holding oral HAART for now. Will need to discuss with infectious disease if NG tube is in for extended period.     Discussed with Dr. Devin Mathews  General Surgery PGY-3  [phone]

## 2018-04-16 NOTE — ANESTHESIA CARE TRANSFER NOTE
Patient: Andrew Lockwood    Procedure(s):  Diagnostic laparoscopy and lysis of adhesions - Wound Class: I-Clean    Diagnosis: Small bowel obstruction  Diagnosis Additional Information: No value filed.    Anesthesia Type:   No value filed.     Note:  Airway :Face Mask  Patient transferred to:PACU  Comments: To PACU via facemask and hi flow oxygen. VSS. Report given to RN at bedside. Patient denies pain or nausea. Handoff Report: Identifed the Patient, Identified the Reponsible Provider, Reviewed the pertinent medical history, Discussed the surgical course, Reviewed Intra-OP anesthesia mangement and issues during anesthesia, Set expectations for post-procedure period and Allowed opportunity for questions and acknowledgement of understanding      Vitals: (Last set prior to Anesthesia Care Transfer)    CRNA VITALS  4/16/2018 1304 - 4/16/2018 1350      4/16/2018             NIBP: 136/85    Pulse: 115                Electronically Signed By: VIKASH Lawton CRNA  April 16, 2018  1:50 PM

## 2018-04-16 NOTE — PLAN OF CARE
Problem: Patient Care Overview  Goal: Plan of Care/Patient Progress Review  1. Bowel obstruction resolution  2. Able to take po and tolerate without nausea or pain  3. Pain well controlled on oral meds    Outcome: No Change  Afebrile, VSS prior to transfer to preop. C/O abdominal pain, treated with IV morphine 2mg with adequate relief.  , 109, no insulin needed. LR started at 150cc/hour. Voided prior to surgery. Jaxon wipes done. Pt ambulated to [facility] for transport. Pt belongings kept in room, personal med box sent to security. Report given to preop nurse. Pt in PACU as of 1330.

## 2018-04-16 NOTE — IP AVS SNAPSHOT
[address] [address]    Phone:  [phone]                                       After Visit Summary   4/16/2018    Andrew Lockwood    MRN: [id number]           After Visit Summary Signature Page     I have received my discharge instructions, and my questions have been answered. I have discussed any challenges I see with this plan with the nurse or doctor.    ..........................................................................................................................................  Patient/Patient Representative Signature      ..........................................................................................................................................  Patient Representative Print Name and Relationship to Patient    ..................................................               ................................................  Date                                            Time    ..........................................................................................................................................  Reviewed by Signature/Title    ...................................................              ..............................................  Date                                                            Time

## 2018-04-16 NOTE — ANESTHESIA POSTPROCEDURE EVALUATION
Patient: Andrew Lockwood    Procedure(s):  Diagnostic laparoscopy and lysis of adhesions - Wound Class: I-Clean    Diagnosis:Small bowel obstruction  Diagnosis Additional Information: No value filed.    Anesthesia Type:  No value filed.    Note:  Anesthesia Post Evaluation    Patient location during evaluation: PACU  Patient participation: Able to fully participate in evaluation  Level of consciousness: awake and alert  Pain management: adequate  Airway patency: patent  Cardiovascular status: acceptable  Respiratory status: acceptable  Hydration status: acceptable  PONV: none     Anesthetic complications: None          Last vitals:  Vitals:    04/16/18 1345 04/16/18 1400 04/16/18 1430   BP: (!) 135/91 (!) 128/99 141/82   Pulse:      Resp: 12 12 14   Temp:  36.8  C (98.3  F)    SpO2: 100% 99% 100%         Electronically Signed By: Toyin Vick MD  April 16, 2018  2:44 PM

## 2018-04-16 NOTE — ED NOTES
Pt arrived for abdominal pain which started today. He has on januvia for two days and he attributes this to the pain. He is alert, oriented, ambulatory. He is homeless. He feels cramping and has vomited. No diarrhea. No fever or chills

## 2018-04-16 NOTE — ANESTHESIA PREPROCEDURE EVALUATION
Anesthesia Evaluation     . Pt has had prior anesthetic. Type: General    No history of anesthetic complications          ROS/MED HX    ENT/Pulmonary:     (+)sleep apnea, , . .    Neurologic:       Cardiovascular:         METS/Exercise Tolerance:     Hematologic:         Musculoskeletal:         GI/Hepatic:     (+) GERD       Renal/Genitourinary:         Endo:     (+) type II DM .      Psychiatric:         Infectious Disease:   (+) HIV,       Malignancy:         Other:                     Physical Exam      Airway   Mallampati: III  TM distance: >3 FB  Neck ROM: full    Dental   (+) missing and implants    Cardiovascular   Rhythm and rate: regular and normal      Pulmonary    breath sounds clear to auscultation                        Anesthesia Plan      History & Physical Review      ASA Status:  3 .    NPO Status:  > 8 hours    Plan for General, RSI and ETT with Intravenous induction. Maintenance will be Balanced.    PONV prophylaxis:  Ondansetron (or other 5HT-3) and Dexamethasone or Solumedrol       Postoperative Care  Postoperative pain management:  IV analgesics and Multi-modal analgesia.      Consents  Anesthetic plan, risks, benefits and alternatives discussed with:  Patient.  Use of blood products discussed: Yes.   Use of blood products discussed with Patient.  Consented to blood products.  .          50M with HIV and DM presented with abd pain, found to have SBO, now scheduled for emergent laparoscopy.  ASA 3.  Plan: GETA, RSI, PIVx2.  Risks of anesthesia discussed, including sore throat, PONV and risk of dental or lip trauma.    Toyin Vick MD  Staff Anesthesiologist  Pager [phone]

## 2018-04-16 NOTE — ED NOTES
Pt states the stomach pain is in his lower abdomen, it does not radiate anywhere, he denies fever or chills

## 2018-04-17 ENCOUNTER — APPOINTMENT (OUTPATIENT)
Dept: GENERAL RADIOLOGY | Facility: CLINIC | Age: 53
End: 2018-04-17
Attending: SURGERY
Payer: COMMERCIAL

## 2018-04-17 LAB
ANION GAP SERPL CALCULATED.3IONS-SCNC: 8 MMOL/L (ref 3–14)
BUN SERPL-MCNC: 8 MG/DL (ref 7–30)
CALCIUM SERPL-MCNC: 8.4 MG/DL (ref 8.5–10.1)
CHLORIDE SERPL-SCNC: 107 MMOL/L (ref 94–109)
CO2 SERPL-SCNC: 24 MMOL/L (ref 20–32)
CREAT SERPL-MCNC: 0.65 MG/DL (ref 0.66–1.25)
ERYTHROCYTE [DISTWIDTH] IN BLOOD BY AUTOMATED COUNT: 13.9 % (ref 10–15)
GFR SERPL CREATININE-BSD FRML MDRD: >90 ML/MIN/1.7M2
GLUCOSE BLDC GLUCOMTR-MCNC: 119 MG/DL (ref 70–99)
GLUCOSE BLDC GLUCOMTR-MCNC: 126 MG/DL (ref 70–99)
GLUCOSE BLDC GLUCOMTR-MCNC: 135 MG/DL (ref 70–99)
GLUCOSE BLDC GLUCOMTR-MCNC: 139 MG/DL (ref 70–99)
GLUCOSE BLDC GLUCOMTR-MCNC: 152 MG/DL (ref 70–99)
GLUCOSE BLDC GLUCOMTR-MCNC: 154 MG/DL (ref 70–99)
GLUCOSE SERPL-MCNC: 127 MG/DL (ref 70–99)
HCT VFR BLD AUTO: 38.1 % (ref 40–53)
HGB BLD-MCNC: 12.3 G/DL (ref 13.3–17.7)
MAGNESIUM SERPL-MCNC: 1.9 MG/DL (ref 1.6–2.3)
MCH RBC QN AUTO: 30.4 PG (ref 26.5–33)
MCHC RBC AUTO-ENTMCNC: 32.3 G/DL (ref 31.5–36.5)
MCV RBC AUTO: 94 FL (ref 78–100)
PHOSPHATE SERPL-MCNC: 2.9 MG/DL (ref 2.5–4.5)
PLATELET # BLD AUTO: 223 10E9/L (ref 150–450)
POTASSIUM SERPL-SCNC: 3.8 MMOL/L (ref 3.4–5.3)
RBC # BLD AUTO: 4.05 10E12/L (ref 4.4–5.9)
SODIUM SERPL-SCNC: 138 MMOL/L (ref 133–144)
WBC # BLD AUTO: 10.5 10E9/L (ref 4–11)

## 2018-04-17 PROCEDURE — 25800025 ZZH RX 258: Performed by: STUDENT IN AN ORGANIZED HEALTH CARE EDUCATION/TRAINING PROGRAM

## 2018-04-17 PROCEDURE — 12000025 ZZH R&B TRANSPLANT INTERMEDIATE

## 2018-04-17 PROCEDURE — 80048 BASIC METABOLIC PNL TOTAL CA: CPT | Performed by: STUDENT IN AN ORGANIZED HEALTH CARE EDUCATION/TRAINING PROGRAM

## 2018-04-17 PROCEDURE — 25000128 H RX IP 250 OP 636: Performed by: STUDENT IN AN ORGANIZED HEALTH CARE EDUCATION/TRAINING PROGRAM

## 2018-04-17 PROCEDURE — 83735 ASSAY OF MAGNESIUM: CPT | Performed by: STUDENT IN AN ORGANIZED HEALTH CARE EDUCATION/TRAINING PROGRAM

## 2018-04-17 PROCEDURE — 25000128 H RX IP 250 OP 636: Performed by: SURGERY

## 2018-04-17 PROCEDURE — 36415 COLL VENOUS BLD VENIPUNCTURE: CPT | Performed by: STUDENT IN AN ORGANIZED HEALTH CARE EDUCATION/TRAINING PROGRAM

## 2018-04-17 PROCEDURE — 40000986 XR ABDOMEN PORT 1 VW

## 2018-04-17 PROCEDURE — 85027 COMPLETE CBC AUTOMATED: CPT | Performed by: STUDENT IN AN ORGANIZED HEALTH CARE EDUCATION/TRAINING PROGRAM

## 2018-04-17 PROCEDURE — 84100 ASSAY OF PHOSPHORUS: CPT | Performed by: STUDENT IN AN ORGANIZED HEALTH CARE EDUCATION/TRAINING PROGRAM

## 2018-04-17 PROCEDURE — 00000146 ZZHCL STATISTIC GLUCOSE BY METER IP

## 2018-04-17 PROCEDURE — 25000132 ZZH RX MED GY IP 250 OP 250 PS 637: Performed by: STUDENT IN AN ORGANIZED HEALTH CARE EDUCATION/TRAINING PROGRAM

## 2018-04-17 RX ORDER — SODIUM CHLORIDE, SODIUM LACTATE, POTASSIUM CHLORIDE, CALCIUM CHLORIDE 600; 310; 30; 20 MG/100ML; MG/100ML; MG/100ML; MG/100ML
1000 INJECTION, SOLUTION INTRAVENOUS CONTINUOUS
Status: DISCONTINUED | OUTPATIENT
Start: 2018-04-17 | End: 2018-04-17

## 2018-04-17 RX ORDER — OXYCODONE HYDROCHLORIDE 5 MG/1
5 TABLET ORAL EVERY 4 HOURS PRN
Status: DISCONTINUED | OUTPATIENT
Start: 2018-04-17 | End: 2018-04-19 | Stop reason: HOSPADM

## 2018-04-17 RX ORDER — ACETAMINOPHEN 325 MG/1
650 TABLET ORAL EVERY 6 HOURS
Status: DISCONTINUED | OUTPATIENT
Start: 2018-04-17 | End: 2018-04-19 | Stop reason: HOSPADM

## 2018-04-17 RX ORDER — HYDROMORPHONE HCL/0.9% NACL/PF 0.2MG/0.2
0.2 SYRINGE (ML) INTRAVENOUS
Status: DISCONTINUED | OUTPATIENT
Start: 2018-04-17 | End: 2018-04-18

## 2018-04-17 RX ORDER — DEXTROSE MONOHYDRATE, SODIUM CHLORIDE, AND POTASSIUM CHLORIDE 50; 1.49; 4.5 G/1000ML; G/1000ML; G/1000ML
INJECTION, SOLUTION INTRAVENOUS CONTINUOUS
Status: DISCONTINUED | OUTPATIENT
Start: 2018-04-17 | End: 2018-04-18

## 2018-04-17 RX ADMIN — INSULIN ASPART 1 UNITS: 100 INJECTION, SOLUTION INTRAVENOUS; SUBCUTANEOUS at 16:47

## 2018-04-17 RX ADMIN — ACETAMINOPHEN 650 MG: 325 SOLUTION ORAL at 09:12

## 2018-04-17 RX ADMIN — ELVITEGRAVIR, COBICISTAT, EMTRICITABINE, AND TENOFOVIR ALAFENAMIDE 1 TABLET: 150; 150; 200; 10 TABLET ORAL at 09:12

## 2018-04-17 RX ADMIN — POTASSIUM CHLORIDE, DEXTROSE MONOHYDRATE AND SODIUM CHLORIDE: 150; 5; 450 INJECTION, SOLUTION INTRAVENOUS at 14:00

## 2018-04-17 RX ADMIN — Medication 0.2 MG: at 14:23

## 2018-04-17 RX ADMIN — INSULIN ASPART 1 UNITS: 100 INJECTION, SOLUTION INTRAVENOUS; SUBCUTANEOUS at 01:16

## 2018-04-17 RX ADMIN — OXYCODONE HYDROCHLORIDE 5 MG: 5 TABLET ORAL at 23:01

## 2018-04-17 RX ADMIN — Medication 0.2 MG: at 17:37

## 2018-04-17 RX ADMIN — Medication 0.2 MG: at 04:08

## 2018-04-17 RX ADMIN — Medication 0.2 MG: at 19:30

## 2018-04-17 RX ADMIN — Medication 10 MEQ: at 01:25

## 2018-04-17 RX ADMIN — Medication 0.2 MG: at 21:30

## 2018-04-17 RX ADMIN — SODIUM CHLORIDE, POTASSIUM CHLORIDE, SODIUM LACTATE AND CALCIUM CHLORIDE 1000 ML: 600; 310; 30; 20 INJECTION, SOLUTION INTRAVENOUS at 02:00

## 2018-04-17 RX ADMIN — ACETAMINOPHEN 650 MG: 325 TABLET, FILM COATED ORAL at 20:57

## 2018-04-17 RX ADMIN — ACETAMINOPHEN 650 MG: 325 SOLUTION ORAL at 00:49

## 2018-04-17 ASSESSMENT — PAIN DESCRIPTION - DESCRIPTORS
DESCRIPTORS: SORE
DESCRIPTORS: SORE

## 2018-04-17 NOTE — PROGRESS NOTES
"Social Work Services Progress Note    Hospital Day: 2  Date of Initial Social Work Evaluation:    Collaborated with:  Pt.    Data: SW received consult to assist pt with housing. Pt is homeless and has been living in his car or sleeping on the light rail. Per pt he is also working part time in [place]. Pt states he is working with a [facility]  but they have been unable to find housing. Pt agreeable to going to a shelter and would like it to be in [place]. Pt is interested in [facility], and [facility]. Also discussed [facility] medical respite bed and provided a brochure for pt. At first pt asked what the respite program was and SW stated I would look into it and then pt states \"No I don't want to go there\". SW encouraged pt to think about this and I would find more info which pt was agreeable to.    Per medical team pt will be ready at the end of this week or early next week. Pt states the last time he was discharged he has nowhere to go and he kept getting infections because he was sleeping on the light rail.     Intervention:  Met with pt to introduce self a role, discussed resources for homeless shelter.    Assessment: Pt appreciative of SW involvement and really wants help finding a shelter and states it is very hard for him to find one on his own and only people who are addicted to drugs get beds in shelters.    Plan:    Anticipated Disposition: Homeless shelter.    Barriers to d/c plan:  Medical stability.    Follow Up:  SW will continue to follow and assist with shelter placement.    RADHA Claros, [facility]  [address]   Ph: [phone], Pager: [phone]        "

## 2018-04-17 NOTE — PROGRESS NOTES
"CLINICAL NUTRITION SERVICES - ASSESSMENT NOTE     Nutrition Prescription      Malnutrition Status:    Unable to assess at this time d/t incomplete information    Future/Additional Recommendations:  If unable to advance diet within 5-7 days (beyond CL, with good tolerance) -> Recommend Kabiven TPN @ 65 ml/hr (GIR 1.6 mg/kg/min) x 24 hours.  Pending stable electrolytes, advance as tolerated to a goal rate of 83 ml/hr.  (Note = this will meet calorie goals, but will be slightly short on protein goals d/t amino acid shortages).         REASON FOR ASSESSMENT  Andrew Lockwood is a/an 50 year old male assessed by the dietitian for Admission Nutrition Risk Screen for reduced oral intake over the last month and RN Consult - SBO requiring surgical intervention, poor PO intake x 2 days    NUTRITION HISTORY  PMH of HIV, DM2, appendectomy complicated by abscess requiring drain placement.      Was admitted with nausea/vomiting and abdominal cramping.  Per consult, poor PO intake was for ~2 days prior to admit.     Was taken to the OR for suspected small bowel obstruction, however during surgery a dilated segment of small bowel was found.     CURRENT NUTRITION ORDERS  Diet: NPO  Intake/Tolerance: NGT to LIS    LABS  Albumin 4.0 (normal) - prior to surgery on 4/16/18    MEDICATIONS  Electrolyte replacement protocols in place    ANTHROPOMETRICS  Height: 5'4\"  Most Recent Weight: 80.7 kg (177 lb 14.4 oz)    IBW: 59.1 kg (137%)  BMI: Obesity Grade I BMI 30-34.9  Weight History: Per review of chart, patient appears to be stable and/or have possibly gained 10# over the last 2 months.   Wt Readings from Last 15 Encounters:   04/16/18 80.7 kg (177 lb 14.4 oz)   04/13/18 79.2 kg (174 lb 9.6 oz)   03/29/18 77.1 kg (170 lb)   03/27/18 77.1 kg (170 lb)   03/21/18 77.1 kg (170 lb)   03/15/18 79.4 kg (175 lb)   03/07/18 76.2 kg (167 lb 15.9 oz)   02/22/18 76.6 kg (168 lb 12.8 oz)   02/18/18 79.8 kg (176 lb)   02/18/18 79.8 kg (176 lb)   02/16/18 " 76.7 kg (169 lb)   02/16/18 77 kg (169 lb 12.8 oz)   02/15/18 76 kg (167 lb 8.8 oz)   02/11/18 75.4 kg (166 lb 4.8 oz)   02/07/18 75.5 kg (166 lb 6.4 oz)     Dosing Weight: 65 kg (adj wt based on IBW of 59.1 kg and actual BW of 80.7 kg)    ASSESSED NUTRITION NEEDS  Estimated Energy Needs: 5381-4818 kcals/day (25-30 kcal/kg)  Justification: Maintenance  Estimated Protein Needs: 78-98 grams protein/day (1.2-1.5g/kg)  Justification: Increased needs post-op  Estimated Fluid Needs: 5023-4381 mL/day (25-30mL/kg)   Justification: Maintenance    PHYSICAL FINDINGS  Was unable to complete a full physical assessment.     MALNUTRITION  % Intake: Decreased intake does not meet criteria  % Weight Loss: None noted  Subcutaneous Fat Loss: Unable to assess  Muscle Loss: Unable to assess (pt sleeping)  Fluid Accumulation/Edema: Unable to assess  Malnutrition Diagnosis: Unable to assess at this time d/t incomplete information    NUTRITION DIAGNOSIS  Predicted inadequate oral intake related to poor oral intake over the last 3 days    INTERVENTIONS  Implementation  Attempted to meet with patient this morning, however patient did not wake to voice.    See above recommendations for parenteral nutrition, if necessary.      Goals  Diet advancement (beyond CL) within 5-7 days.     Monitoring/Evaluation  Progress toward goals will be monitored and evaluated per protocol.    Radha Block MS, RD, LD  Pager [phone]

## 2018-04-17 NOTE — PROGRESS NOTES
GENERAL SURGERY PROGRESS NOTE  Andrew Lockwood  4/17/18    Subjective: Voiding, passing flatus, no BM yet. Pain well-controlled with current regimen. No nausea/vomiting.    Objective:    /74 (BP Location: Left arm)  Pulse 94  Temp 98.6  F (37  C) (Oral)  Resp 16  Wt 78.3 kg (172 lb 9.6 oz)  SpO2 98%  BMI 29.63 kg/m2    I/O last 3 completed shifts:  In: 3811.66 [I.V.:3766.66; NG/GT:45]  Out: 2400 [Urine:1950; Emesis/NG output:450]    Exam:  General: alert, lying in bed, no apparent distress  CV: regular rate and rhythm  Pulm: nonlabored breathing  Abd: soft, appropriately tender to palpation, incisions clean, intact  Extremities: no edema, no rash or lesion    CBC RESULTS:   Recent Labs   Lab Test  04/17/18   0656   WBC  10.5   RBC  4.05*   HGB  12.3*   HCT  38.1*   MCV  94   MCH  30.4   MCHC  32.3   RDW  13.9   PLT  223     Recent Results (from the past 24 hour(s))   XR Abdomen Port 1 View    Narrative    XR ABDOMEN PORT 1 VW  4/17/2018 9:32 AM      HISTORY: NG tube placement;     COMPARISON: Plain film of the abdomen for/16/2018    TECHNIQUE: Single supine view of the abdomen    FINDINGS: The NG with tip and side port projecting over the stomach.  No dilated loops of bowel. Lung bases are clear.      Impression    IMPRESSION: NG with tip and side-port projecting over the stomach.  Nonobstructive bowel gas pattern.    I have personally reviewed the examination and initial interpretation  and I agree with the findings.    MELCHOR SAMANIEGO MD ([place])     Assessment/Plan: Andrew is a 50 male with a pmh significant for HIV, DM2, appendectomy c/b abscess, presented with SBO, now s/p laparoscopic lysis of adhesions 4/16.    - NPO, NG to suction  - Gastrograffin challenge tonight  - Continue IVF  - PRN pain control, scheduled tylenol    Patient seen and discussed with staff, Dr. Dowling.    Tosin Wolff  General Surgery PGY1  [id number]

## 2018-04-17 NOTE — PLAN OF CARE
Problem: Patient Care Overview  Goal: Plan of Care/Patient Progress Review  1. Bowel obstruction resolution  2. Able to take po and tolerate without nausea or pain  3. Pain well controlled on oral meds     Outcome: No Change  Pt returned from PACU around 1500. AVSS, weaned to RA. . C/o mid abdominal pain, PRN morphine given x1 (now d/c'd), PRN dilaudid given x1, and received scheduled tylenol. Denies nausea. NGT to LIS, 300 cc tan output. BS hypoactive, pt not passing gas. Voided x1 but flushed in toilet, instructed to use urinal for measurement. Lap sites x3, CDI and JUAN. NPO. MIVF infusing via PIV. Up w/ SBA. Will continue POC.

## 2018-04-17 NOTE — PLAN OF CARE
Problem: Patient Care Overview  Goal: Plan of Care/Patient Progress Review  1. Bowel obstruction resolution  2. Able to take po and tolerate without nausea or pain  3. Pain well controlled on oral meds       AVSS, reporting pain, states some relief with scheduled tylenol, declined need for further pain intervention.  Blood jfrbjymg=421, 117  Patient up in room with minimal to no assist, voiding, passing flatus, no bowel movement, NGT to LIS (150ml output), and continues to be NPO.  Scheduled meds given as ordered.  Continue to monitor, treat as ordered, notify team with changes.    ADDENDUM:  Patient to have gastrografin challenge.  Xray to send up gastrografin.  At 1600, RN to mix 100cc gastrografin with 50cc water, administer down NGT, clamp NGT for 2 hours, then hook back to suction.  Abdominal xray scheduled 8hr (midnight) and 14hrs after administration (0600)

## 2018-04-17 NOTE — PLAN OF CARE
Problem: Patient Care Overview  Goal: Plan of Care/Patient Progress Review  1. Bowel obstruction resolution  2. Able to take po and tolerate without nausea or pain  3. Pain well controlled on oral meds     Outcome: No Change  1900-0730: A&O x 4; VSS on RA; BGs 166, 154, 132 all treated accordingly; No other significant labs; c/o pain administered dilaudid x 3; denies nausea, vomiting and SOB; R.PIV infusing LR at 100, L.PIV saline locked; NPO; NG to LIS with scant output; Voiding adequately into urinal; No gas, No BM; Up independently; Will continue to monitor and update GVS with any significant changes.

## 2018-04-18 ENCOUNTER — APPOINTMENT (OUTPATIENT)
Dept: GENERAL RADIOLOGY | Facility: CLINIC | Age: 53
End: 2018-04-18
Payer: COMMERCIAL

## 2018-04-18 LAB
ANION GAP SERPL CALCULATED.3IONS-SCNC: 7 MMOL/L (ref 3–14)
BUN SERPL-MCNC: 9 MG/DL (ref 7–30)
CALCIUM SERPL-MCNC: 8.6 MG/DL (ref 8.5–10.1)
CHLORIDE SERPL-SCNC: 108 MMOL/L (ref 94–109)
CO2 SERPL-SCNC: 25 MMOL/L (ref 20–32)
CREAT SERPL-MCNC: 0.72 MG/DL (ref 0.66–1.25)
ERYTHROCYTE [DISTWIDTH] IN BLOOD BY AUTOMATED COUNT: 13.7 % (ref 10–15)
GFR SERPL CREATININE-BSD FRML MDRD: >90 ML/MIN/1.7M2
GLUCOSE BLDC GLUCOMTR-MCNC: 124 MG/DL (ref 70–99)
GLUCOSE BLDC GLUCOMTR-MCNC: 126 MG/DL (ref 70–99)
GLUCOSE BLDC GLUCOMTR-MCNC: 132 MG/DL (ref 70–99)
GLUCOSE BLDC GLUCOMTR-MCNC: 134 MG/DL (ref 70–99)
GLUCOSE BLDC GLUCOMTR-MCNC: 148 MG/DL (ref 70–99)
GLUCOSE BLDC GLUCOMTR-MCNC: 181 MG/DL (ref 70–99)
GLUCOSE SERPL-MCNC: 117 MG/DL (ref 70–99)
HCT VFR BLD AUTO: 37.9 % (ref 40–53)
HGB BLD-MCNC: 12.4 G/DL (ref 13.3–17.7)
MAGNESIUM SERPL-MCNC: 2 MG/DL (ref 1.6–2.3)
MCH RBC QN AUTO: 30.8 PG (ref 26.5–33)
MCHC RBC AUTO-ENTMCNC: 32.7 G/DL (ref 31.5–36.5)
MCV RBC AUTO: 94 FL (ref 78–100)
PHOSPHATE SERPL-MCNC: 2.8 MG/DL (ref 2.5–4.5)
PLATELET # BLD AUTO: 248 10E9/L (ref 150–450)
POTASSIUM SERPL-SCNC: 3.5 MMOL/L (ref 3.4–5.3)
RBC # BLD AUTO: 4.02 10E12/L (ref 4.4–5.9)
SODIUM SERPL-SCNC: 140 MMOL/L (ref 133–144)
WBC # BLD AUTO: 7.9 10E9/L (ref 4–11)

## 2018-04-18 PROCEDURE — 25000132 ZZH RX MED GY IP 250 OP 250 PS 637: Performed by: STUDENT IN AN ORGANIZED HEALTH CARE EDUCATION/TRAINING PROGRAM

## 2018-04-18 PROCEDURE — 25000125 ZZHC RX 250: Performed by: SURGERY

## 2018-04-18 PROCEDURE — 36415 COLL VENOUS BLD VENIPUNCTURE: CPT | Performed by: STUDENT IN AN ORGANIZED HEALTH CARE EDUCATION/TRAINING PROGRAM

## 2018-04-18 PROCEDURE — 25800025 ZZH RX 258: Performed by: STUDENT IN AN ORGANIZED HEALTH CARE EDUCATION/TRAINING PROGRAM

## 2018-04-18 PROCEDURE — 00000146 ZZHCL STATISTIC GLUCOSE BY METER IP

## 2018-04-18 PROCEDURE — 25000128 H RX IP 250 OP 636: Performed by: STUDENT IN AN ORGANIZED HEALTH CARE EDUCATION/TRAINING PROGRAM

## 2018-04-18 PROCEDURE — 12000026 ZZH R&B TRANSPLANT

## 2018-04-18 PROCEDURE — 80048 BASIC METABOLIC PNL TOTAL CA: CPT | Performed by: STUDENT IN AN ORGANIZED HEALTH CARE EDUCATION/TRAINING PROGRAM

## 2018-04-18 PROCEDURE — 84100 ASSAY OF PHOSPHORUS: CPT | Performed by: STUDENT IN AN ORGANIZED HEALTH CARE EDUCATION/TRAINING PROGRAM

## 2018-04-18 PROCEDURE — 83735 ASSAY OF MAGNESIUM: CPT | Performed by: STUDENT IN AN ORGANIZED HEALTH CARE EDUCATION/TRAINING PROGRAM

## 2018-04-18 PROCEDURE — 40000986 XR ABDOMEN PORT 1 VW

## 2018-04-18 PROCEDURE — 85027 COMPLETE CBC AUTOMATED: CPT | Performed by: STUDENT IN AN ORGANIZED HEALTH CARE EDUCATION/TRAINING PROGRAM

## 2018-04-18 RX ORDER — OXYCODONE HYDROCHLORIDE 5 MG/1
5 TABLET ORAL EVERY 4 HOURS PRN
Qty: 15 TABLET | Refills: 0 | Status: SHIPPED | OUTPATIENT
Start: 2018-04-18 | End: 2018-04-26

## 2018-04-18 RX ORDER — SENNOSIDES A AND B 8.6 MG/1
1 TABLET, FILM COATED ORAL DAILY PRN
Qty: 10 TABLET | Refills: 0 | Status: SHIPPED | OUTPATIENT
Start: 2018-04-18 | End: 2018-04-26

## 2018-04-18 RX ORDER — ACETAMINOPHEN 325 MG/1
325-650 TABLET ORAL EVERY 6 HOURS PRN
Qty: 60 TABLET | Refills: 0 | Status: SHIPPED | OUTPATIENT
Start: 2018-04-18 | End: 2018-06-04

## 2018-04-18 RX ADMIN — TOPICAL ANESTHETIC 1 EACH: 200 SPRAY DENTAL; PERIODONTAL at 00:49

## 2018-04-18 RX ADMIN — Medication 0.2 MG: at 02:30

## 2018-04-18 RX ADMIN — ACETAMINOPHEN 650 MG: 325 TABLET, FILM COATED ORAL at 08:05

## 2018-04-18 RX ADMIN — TOPICAL ANESTHETIC 1 EACH: 200 SPRAY DENTAL; PERIODONTAL at 03:59

## 2018-04-18 RX ADMIN — Medication 5 MG: at 22:51

## 2018-04-18 RX ADMIN — POTASSIUM CHLORIDE, DEXTROSE MONOHYDRATE AND SODIUM CHLORIDE: 150; 5; 450 INJECTION, SOLUTION INTRAVENOUS at 02:59

## 2018-04-18 RX ADMIN — ELVITEGRAVIR, COBICISTAT, EMTRICITABINE, AND TENOFOVIR ALAFENAMIDE 1 TABLET: 150; 150; 200; 10 TABLET ORAL at 08:05

## 2018-04-18 RX ADMIN — ACETAMINOPHEN 650 MG: 325 TABLET, FILM COATED ORAL at 20:45

## 2018-04-18 RX ADMIN — ACETAMINOPHEN 650 MG: 325 TABLET, FILM COATED ORAL at 02:30

## 2018-04-18 RX ADMIN — ACETAMINOPHEN 650 MG: 325 TABLET, FILM COATED ORAL at 15:59

## 2018-04-18 RX ADMIN — INSULIN ASPART 1 UNITS: 100 INJECTION, SOLUTION INTRAVENOUS; SUBCUTANEOUS at 00:01

## 2018-04-18 RX ADMIN — INSULIN ASPART 1 UNITS: 100 INJECTION, SOLUTION INTRAVENOUS; SUBCUTANEOUS at 15:59

## 2018-04-18 NOTE — PLAN OF CARE
Problem: Patient Care Overview  Goal: Plan of Care/Patient Progress Review  1. Bowel obstruction resolution  2. Able to take po and tolerate without nausea or pain  3. Pain well controlled on oral meds     Outcome: Improving  VSS on RA. Blood sugars 148 and 124. Abdominal Xray done at 00:00. Still in process.Taking oxycodone and IV Dilaudid for incisional pain. Denies nausea. NPO. PIV infusing D5 0.45% NS with 20mEq of KCl at 100mL/hr. NG tube to LIS, had 250mL of bile out this shift. Voiding on demand. Had one loose bowel movement this morning. Lap sites dermabonded, JUAN, no drainage. Erythema noted to all four sites, but LLQ site is worse than the rest. Borders marked with a skin pen. Able to ambulate independently.

## 2018-04-18 NOTE — PROGRESS NOTES
GENERAL SURGERY PROGRESS NOTE  Andrew Lockwood  4/17/18    Subjective: Gastrografin challenge last night, had 3 large BMs overnight, passing flatus. Denies n/v, fevers, chills, SOB, or CP.  Pain well-controlled with current regimen.    Objective:    /80 (BP Location: Left arm)  Pulse 65  Temp 98.5  F (36.9  C) (Oral)  Resp 16  Wt 78.3 kg (172 lb 9.6 oz)  SpO2 98%  BMI 29.63 kg/m2    I/O last 3 completed shifts:  In: 2671.66 [I.V.:2371.66; Other:150; NG/GT:150]  Out: 1950 [Urine:1325; Emesis/NG output:625]    Exam:  General: alert, lying in bed, no apparent distress  CV: regular rate and rhythm  Pulm: nonlabored breathing  Abd: soft, appropriately tender to palpation, incisions clean, intact  Extremities: no edema, no rash or lesion    CBC RESULTS:   Recent Labs   Lab Test  04/17/18   0656   WBC  10.5   RBC  4.05*   HGB  12.3*   HCT  38.1*   MCV  94   MCH  30.4   MCHC  32.3   RDW  13.9   PLT  223     Recent Results (from the past 24 hour(s))   XR Abdomen Port 1 View    Narrative    XR ABDOMEN PORT 1 VW  4/17/2018 9:32 AM      HISTORY: NG tube placement;     COMPARISON: Plain film of the abdomen for/16/2018    TECHNIQUE: Single supine view of the abdomen    FINDINGS: The NG with tip and side port projecting over the stomach.  No dilated loops of bowel. Lung bases are clear.      Impression    IMPRESSION: NG with tip and side-port projecting over the stomach.  Nonobstructive bowel gas pattern.    I have personally reviewed the examination and initial interpretation  and I agree with the findings.    MELCHOR SAMANIEGO MD ([place])     Assessment/Plan: Andrew is a 50 male with a pmh significant for HIV, DM2, appendectomy c/b abscess, presented with SBO, now s/p laparoscopic lysis of adhesions 4/16.  With ROBF overnight.    - GGC with contrast throughout entire colon  - NGT removed this am  - CLD, Reg diet later if fabian clears  - PRN pain control, scheduled tylenol  - SW for placement, appreciate assistance    Patient seen  and discussed with chief, Dr. Mcnair.    Tosin Wolff  General Surgery PGY1  [id number]

## 2018-04-18 NOTE — DISCHARGE SUMMARY
GENERAL SURGERY DISCHARGE SUMMARY    DATE OF ADMISSION:  4/16/2018    DATE OF DISCHARGE:  4/19/2018    ATTENDING PHYSICIAN: Surgeon(s) and Role:     * Prince Dowling MD - Primary     * Mainor De Leon MD - Resident - Assisting    DISCHARGE DIAGNOSES:  Small bowel obstruction    PROCEDURES PERFORMED:  Procedure(s):  Diagnostic laparoscopy and lysis of adhesions - Wound Class: I-Clean    HPI:  Andrew is a 50-year old male with a history of HIV, DM2, CNS toxoplasmosis, and appendectomy complicated by abscess who presented to the ED with a 1 day history of crampy mid-abdominal pain. The pain started shortly after eating dinner. He has associated nausea and vomiting, but denies fever. Last BM was the day of admission. He has had similar pain in the past; it is unclear if these have been due to small bowel obstruction or not. CT done in the ED this admission showed potential closed loop obstruction.    HOSPITAL COURSE:  He was admitted to general surgery, he was initially managed with NPO, NGT, and MIVF.  On hospital day 2 however, he was not improving and he was taken to the OR the afternoon of 4/16 for exploratory laparoscopy with lysis of adhesions. The surgery demonstrated multiple dilated loops of small bowel, and no evidence of large bowel obstruction.    The surgery was uncomplicated and Andrew tolerated it well. Multiple adhesions were present, and were taken down. No other abnormalities were noted. He underwent gastrografin challenge on POD1 and subsequently had return of bowel function, and contrast throughout the colon.  On the morning of POD 2 he was tolerating a clear liquid diet, and was tolerating regular diet by the afternoon of 4/18.  He was deemed safe for discharge to a shelter on POD 3.    At the time of discharge, the patient was tolerating regular diet, had pain well controlled on oral pain medications, and was voiding freely.    PHYSICAL EXAM AT DISCHARGE:  HEENT: EOMI, mucous membranes  moist, no discharge  Neck: No lymphadenopathy, carotid pulses 2+  CV: RRR, normal S1 and S2, no murmurs  Pulmonary: Lungs CTAB, no wheezes or crackles  Abdomen: Soft, appropriately tender, nondistended  Extremities: no edema, 2+ radial and posterior tibial pulses bilaterally  Neuro: CN II-XII grossly intact, moving all extremities equally  Psych: Alert, oriented, pleasant affect    DISCHARGE INSTRUCTIONS:    Discharge Procedure Orders  Reason for your hospital stay   Order Comments: You were hospitalized following small bowel obstruction.     Adult [facility] Follow-up and recommended labs and tests   Order Comments: Follow up with primary care provider, Rubio Cronin, within 2 weeks, to evaluate after surgery and for hospital follow- up. No follow up labs or test are needed. 5/1/18 at 3:40pm with Dr. Haq  Follow up with Dr. Dowling , at general surgery clinic, in 10-14 days  to evaluate after surgery. No follow up labs or test are needed.    Appointments on [place] and/or [facility] (with [facility] or [facility] provider or service). Call [phone] if you haven't heard regarding these appointments within 7 days of discharge.     Discharge Instructions   Order Comments: Discharge Instructions  Activity  - No strenuous exercise for 4-6 weeks  - No lifting, pushing, pulling more than 10 pounds for 4-6 weeks  - Do not operate a motor vehicle while taking narcotic pain medications    Incisions  - You may shower and get incisions wet starting 24 hrs after surgery  - Do not scrub incisions or submerge wounds (e.g. bath, pool, hot tub, etc.) for 2 weeks or until the glue or steri-strips have come off    Drain Care  - You do not have a drain.  Specific care/instructions:  Not Applicable    Medications  - Take tylenol as needed for pain, you may alternate ibuprofen and tylenol if you prefer.  Use Oxycodone for breakthrough pain, if needed.  - Do not take more than 4,000mg of acetaminophen in any 24 hour period, as this can  cause liver damage  - Take stool softeners such as Senna or Miralax while you are using narcotics, but stop if you develop diarrhea  - Wean yourself off of narcotic pain medications    Follow-Up:  - Call your primary care provider to touch base regarding your recent admission.     - Call or return sooner than your regularly scheduled visit if you develop any of the following: fever >101.5, uncontrolled pain, uncontrolled nausea or vomiting, as well as increased redness, swelling, or drainage from your wound.   -  A nurse from the General Surgery Clinic will contact you 24 hours, or next business day, after your discharge from the hospital.  If you have questions or to schedule a follow up appointment please call the General Surgery Clinic at [phone].  Call [phone] and ask to speak with the Surgery resident on call if you are having troubles in the evenings, at night, or on the weekend.  -  If you are receiving home care please inform your home care nurse of our contact number.         DISCHARGE MEDICATIONS:     Review of your medicines      START taking       Dose / Directions    oxyCODONE IR 5 MG tablet   Commonly known as:  ROXICODONE   Used for:  Small bowel obstruction        Dose:  5 mg   Take 1 tablet (5 mg) by mouth every 4 hours as needed for moderate to severe pain   Quantity:  15 tablet   Refills:  0       senna 8.6 MG tablet   Commonly known as:  SENOKOT   Used for:  Small bowel obstruction        Dose:  1 tablet   Take 1 tablet by mouth daily as needed for constipation   Quantity:  10 tablet   Refills:  0         CONTINUE these medicines which may have CHANGED, or have new prescriptions. If we are uncertain of the size of tablets/capsules you have at home, strength may be listed as something that might have changed.       Dose / Directions    acetaminophen 325 MG tablet   Commonly known as:  TYLENOL   This may have changed:    - how much to take  - when to take this  - reasons to take this    Used for:  Abdominal abscess (H)        Dose:  325-650 mg   Take 1-2 tablets (325-650 mg) by mouth every 6 hours as needed for mild pain   Quantity:  60 tablet   Refills:  0         CONTINUE these medicines which have NOT CHANGED       Dose / Directions    alum & mag hydroxide-simethicone 400-400-40 MG/5ML Susp suspension   Commonly known as:  MAALOX ADVANCED MAX ST        Dose:  30 mL   Take 30 mLs by mouth every 4 hours as needed for indigestion   Quantity:  355 mL   Refills:  0       blood glucose lancets standard   Commonly known as:  no brand specified   Used for:  Type 2 diabetes mellitus without complication, without long-term current use of insulin (H)        Use to test blood sugar four times daily or as directed.   Quantity:  100 Box   Refills:  5       * blood glucose monitoring meter device kit   Commonly known as:  no brand specified   Used for:  Type 2 diabetes mellitus without complication, without long-term current use of insulin (H)        Use to test blood sugar four times daily or as directed.   Quantity:  1 kit   Refills:  0       * blood glucose monitoring meter device kit   Commonly known as:  no brand specified   Used for:  Type 2 diabetes mellitus with complication, without long-term current use of insulin (H)        Use to test blood sugar 4 times daily or as directed.   Quantity:  1 kit   Refills:  0       * blood glucose monitoring test strip   Commonly known as:  no brand specified   Used for:  Type 2 diabetes mellitus without complication, without long-term current use of insulin (H)        Use to test blood sugars four times daily or as directed   Quantity:  100 each   Refills:  5       * blood glucose monitoring test strip   Commonly known as:  no brand specified   Used for:  Type 2 diabetes mellitus with complication, without long-term current use of insulin (H)        Dose:  1 strip   1 strip by In Vitro route 4 times daily (before meals and nightly) Use to test blood sugars 4  times daily or as directed   Quantity:  100 strip   Refills:  11       Cclivzo-Swnlz-Lfrtymht-TenofAF 017-875-244-10 MG Tabs per tablet   Commonly known as:  GENVOYA   Indication:  Positive HIV Test   Used for:  AIDS (acquired immune deficiency syndrome) (H)        Dose:  1 tablet   Take 1 tablet by mouth daily (with breakfast)   Quantity:  30 tablet   Refills:  0       fluticasone 50 MCG/ACT spray   Commonly known as:  FLONASE   Used for:  Acute otitis media, unspecified otitis media type        Dose:  2 spray   Spray 2 sprays into both nostrils 2 times daily   Quantity:  9.9 mL   Refills:  3       ibuprofen 600 MG tablet   Commonly known as:  ADVIL/MOTRIN   Used for:  OME (otitis media with effusion), left        Dose:  600 mg   Take 1 tablet (600 mg) by mouth every 8 hours as needed for moderate pain   Quantity:  60 tablet   Refills:  0       insulin pen needle 31G X 8 MM   Commonly known as:  B-D U/F   Used for:  Type 2 diabetes mellitus without complication, without long-term current use of insulin (H)        Use 1 pen needles daily or as directed.   Quantity:  100 each   Refills:  0       omeprazole 20 MG CR capsule   Commonly known as:  priLOSEC   Used for:  Epigastric pain        TAKE ONE CAPSULE BY MOUTH EVERY DAY   Quantity:  90 capsule   Refills:  1       simethicone 125 MG Chew chewable tablet   Commonly known as:  GAS-X EXTRA STRENGTH   Used for:  Gas pain        Dose:  125 mg   Take 1 tablet (125 mg) by mouth 2 times daily   Quantity:  120 tablet   Refills:  0       sitagliptin 50 MG tablet   Commonly known as:  JANUVIA   Used for:  Type 2 diabetes mellitus with hyperglycemia, without long-term current use of insulin (H)        Dose:  50 mg   Take 1 tablet (50 mg) by mouth daily   Quantity:  90 tablet   Refills:  3       * Notice:  This list has 4 medication(s) that are the same as other medications prescribed for you. Read the directions carefully, and ask your doctor or other care provider to review  them with you.         Where to get your medicines      These medications were sent to [facility] - [place], [place] - [address]  [address] [address]     Phone:  [phone]      acetaminophen 325 MG tablet     senna 8.6 MG tablet         Some of these will need a paper prescription and others can be bought over the counter. Ask your nurse if you have questions.     Bring a paper prescription for each of these medications      oxyCODONE IR 5 MG tablet             Patient discussed with staff.    Tosin Wolff  General Surgery PGY1

## 2018-04-18 NOTE — PROGRESS NOTES
Social Work Services Progress Note    Hospital Day: 3  Date of Initial Social Work Evaluation:    Collaborated with:  Pt, homeless shelters, [facility] liaison Briseyda.    Data:  Pt is chronically homeless, SW assisting him with getting back to the shelter. Pt will need to call shelter connect to get any shelter bed in [place] (including [facility] where pt would like to go). Per Briseyda at [facility] she will put pt on a list for a 30 day placement at [facility] but this will take a few weeks to obtain. Pt can also go to [facility] tomorrow between 10:30-3:30pm and get a free shelter bed (which is only guaranteed for one night, or he can pay $6/day for a bed which will be guaranteed for a year). Pt states he needs a cab voucher to get to his work tomorrow and then his boss will assist him in getting his car and then he will drive to [facility]. If he cannot get a bed then he will sleep in his car.     Intervention:  DAYNA spoke with pt and provided updates, also spoke with [facility] shelters.    Assessment: Pt agreeable to a shelter bed but would like to go for a long term placement.    Plan:    Anticipated Disposition: [facility].    Barriers to d/c plan:  Medical stability- pt can leave tomorrow.    Follow Up:  DAYNA will provide pt with a cab voucher tomorrow AM so pt can go to [facility] between 10:30-3:30pm.    RADHA Claros, [facility]  7A   Ph: [phone], Pager: [phone]

## 2018-04-18 NOTE — PLAN OF CARE
Problem: Patient Care Overview  Goal: Plan of Care/Patient Progress Review  1. Bowel obstruction resolution  2. Able to take po and tolerate without nausea or pain  3. Pain well controlled on oral meds     Outcome: No Change  VS: VSS. Afebrile.   Mental Status: A&Ox4 - leaving floor frequently to go outside  Cardiac: WNL, denies chest pain  Respiratory: LS clear, bilaterally   GI/: pt voiding adequate amount urine via urinal. Abdomen distended, + hypoactive, high-pitched bowel tones, denies nausea. Gastrografin study initiated. Plan for xray x2 (timed @ 2315 today and 0600 tomorrow) to complete study. NG to LIS, shift total 225. + Lg BM, + sm BM - loose, liquid stool   Pain: + abdominal pain, sore throat, and headache - PRN dilaudid given x3, scheduled tylenol given x1 with relief in pain   Diet: continues NPO, clamped with medication administration   Mobility: Up independent   Treatment: Continue abdominal rest, gastrografin study results pending, pain management   DC plan: DAYNA ALMENDAREZ following due to currently being homeless

## 2018-04-18 NOTE — PLAN OF CARE
Problem: Patient Care Overview  Goal: Plan of Care/Patient Progress Review  1. Bowel obstruction resolution  2. Able to take po and tolerate without nausea or pain  3. Pain well controlled on oral meds     Outcome: Improving  VS and BG stable this AM.  NGT dc'd per MD.  Patient very happy.  Ordered for clears and tolerated broth.  C/o discomfort at PIV site.  PIV dc'd and another reordered.  Patient up to shower then stated he was going walking and would be out to smoke.  Patient not seen on unit since before noon.  VA up to start PIV several times but patient not in room.  Belongings still at bedside.  MDs up to see but patient not in room.  Diet has been advanced but patient unaware.  Discharge written at this time but patient still missing.   CN updated re: missing status.

## 2018-04-19 ENCOUNTER — CARE COORDINATION (OUTPATIENT)
Dept: SURGERY | Facility: CLINIC | Age: 53
End: 2018-04-19

## 2018-04-19 VITALS
SYSTOLIC BLOOD PRESSURE: 126 MMHG | WEIGHT: 172.5 LBS | RESPIRATION RATE: 16 BRPM | HEART RATE: 85 BPM | BODY MASS INDEX: 29.61 KG/M2 | DIASTOLIC BLOOD PRESSURE: 94 MMHG | OXYGEN SATURATION: 98 % | TEMPERATURE: 98.2 F

## 2018-04-19 LAB
GLUCOSE BLDC GLUCOMTR-MCNC: 119 MG/DL (ref 70–99)
GLUCOSE BLDC GLUCOMTR-MCNC: 134 MG/DL (ref 70–99)

## 2018-04-19 PROCEDURE — 25000132 ZZH RX MED GY IP 250 OP 250 PS 637: Performed by: STUDENT IN AN ORGANIZED HEALTH CARE EDUCATION/TRAINING PROGRAM

## 2018-04-19 PROCEDURE — 00000146 ZZHCL STATISTIC GLUCOSE BY METER IP

## 2018-04-19 RX ADMIN — ELVITEGRAVIR, COBICISTAT, EMTRICITABINE, AND TENOFOVIR ALAFENAMIDE 1 TABLET: 150; 150; 200; 10 TABLET ORAL at 08:04

## 2018-04-19 RX ADMIN — ACETAMINOPHEN 650 MG: 325 TABLET, FILM COATED ORAL at 03:47

## 2018-04-19 NOTE — PLAN OF CARE
Problem: Patient Care Overview  Goal: Plan of Care/Patient Progress Review  1. Bowel obstruction resolution  2. Able to take po and tolerate without nausea or pain  3. Pain well controlled on oral meds     Outcome: Adequate for Discharge Date Met: 04/18/18  VS: VSS. Afebrile.   Mental Status: A&Ox4 - leaving floor frequently to go outside  Cardiac: WNL, denies chest pain  Respiratory: LS clear, bilaterally   GI/: pt voiding adequate amount urine via urinal. Abdomen distended, + bowel tones, denies nausea. NG pulled on day shift. + loose BM's per pt report.  Pain: Denies pain, continues on scheduled tylenol   Diet: Tolerating regular diet - , 126   Mobility: Up independent   Treatment: Plan to dc tomorrow with cab voucher. Will need medication brought up from security.   DC plan: Tomorrow AM, SW following

## 2018-04-19 NOTE — PROGRESS NOTES
Andrew Lockwood is a patient of Dr. Prince Dowling that underwent a Diagnostic laparoscopy and lysis of adhesions approximately 3 days ago.  Attempted to contact patient via telephone for a status update and review post op teaching.  LM on VM to call office.  Await return call.      Of note:  Pathology: NA  Wound:  Dermabond  Follow-up:  Routine  Restrictions:  - No lifting, pushing, pulling more than 15-20 pounds for 3-4 weeks  New medications:  Oxycodone, Senna

## 2018-04-19 NOTE — PROGRESS NOTES
Social Work Services Discharge Note      Patient Name:  Andrew Lockwood     Anticipated Discharge Date:  4/19/2018    Discharge Disposition:   [facility]    Following MD:  Dr. Wolff     Pre-Admission Screening (PAS) online form has been completed.  The Level of Care (LOC) is:  Determined  Confirmation Code is: None needed pt not going to TCU.  Patient/caregiver informed of referral to [facility] for Pre-Admission Screening for skilled nursing facility (SNF) placement and to expect a phone call post discharge from SNF.     Additional Services/Equipment Arranged:  SW arranged for cab voucher to bring him to [facility].     Patient / Family response to discharge plan:  In agreement.     Persons notified of above discharge plan:  Pt, medical team.    Staff Discharge Instructions:  Please fax discharge orders and signed hard scripts for any controlled substances.  Please print a packet and send with patient.     CTS Handoff completed:  NO    Medicare Notice of Rights provided to the patient/family:  NO    RADHA Claros, [facility]  7A   Ph: [phone], Pager: [phone]

## 2018-04-19 NOTE — PLAN OF CARE
Problem: Patient Care Overview  Goal: Plan of Care/Patient Progress Review  1. Bowel obstruction resolution  2. Able to take po and tolerate without nausea or pain  3. Pain well controlled on oral meds     Outcome: Adequate for Discharge Date Met: 04/19/18  VS and BG stable.   Patient ok'd per discharge today.  Has all needed scripts.  Home med. Box retreived from Security.   PIVs dc'd yesterday.  Discharge orders written and reviewed with patient .  ZAIN Che to get cab voucher for patient and then he is OK to discharge.  Patient is homeless so to f/u with shelter per arrangement for place to stay.  Friend in room at side at this time.

## 2018-04-20 ENCOUNTER — HOSPITAL ENCOUNTER (EMERGENCY)
Facility: CLINIC | Age: 53
Discharge: HOME | End: 2018-04-20
Attending: EMERGENCY MEDICINE | Admitting: EMERGENCY MEDICINE
Payer: COMMERCIAL

## 2018-04-20 ENCOUNTER — APPOINTMENT (OUTPATIENT)
Dept: CT IMAGING | Facility: CLINIC | Age: 53
End: 2018-04-20
Attending: EMERGENCY MEDICINE
Payer: COMMERCIAL

## 2018-04-20 VITALS
OXYGEN SATURATION: 100 % | WEIGHT: 172 LBS | TEMPERATURE: 97.5 F | DIASTOLIC BLOOD PRESSURE: 88 MMHG | RESPIRATION RATE: 20 BRPM | SYSTOLIC BLOOD PRESSURE: 126 MMHG | BODY MASS INDEX: 29.52 KG/M2 | HEART RATE: 99 BPM

## 2018-04-20 DIAGNOSIS — Z59.00 HOMELESSNESS: ICD-10-CM

## 2018-04-20 DIAGNOSIS — S30.1XXA CONTUSION OF ABDOMINAL WALL, INITIAL ENCOUNTER: ICD-10-CM

## 2018-04-20 DIAGNOSIS — S09.90XA CLOSED HEAD INJURY, INITIAL ENCOUNTER: ICD-10-CM

## 2018-04-20 DIAGNOSIS — R10.84 ABDOMINAL PAIN, GENERALIZED: ICD-10-CM

## 2018-04-20 LAB
ANION GAP SERPL CALCULATED.3IONS-SCNC: 9 MMOL/L (ref 3–14)
BASOPHILS # BLD AUTO: 0 10E9/L (ref 0–0.2)
BASOPHILS NFR BLD AUTO: 0.3 %
BUN SERPL-MCNC: 13 MG/DL (ref 7–30)
CALCIUM SERPL-MCNC: 8.9 MG/DL (ref 8.5–10.1)
CHLORIDE SERPL-SCNC: 105 MMOL/L (ref 94–109)
CO2 SERPL-SCNC: 26 MMOL/L (ref 20–32)
CREAT SERPL-MCNC: 0.89 MG/DL (ref 0.66–1.25)
DIFFERENTIAL METHOD BLD: NORMAL
EOSINOPHIL # BLD AUTO: 0.3 10E9/L (ref 0–0.7)
EOSINOPHIL NFR BLD AUTO: 3.6 %
ERYTHROCYTE [DISTWIDTH] IN BLOOD BY AUTOMATED COUNT: 13.9 % (ref 10–15)
GFR SERPL CREATININE-BSD FRML MDRD: 90 ML/MIN/1.7M2
GLUCOSE SERPL-MCNC: 135 MG/DL (ref 70–99)
HCT VFR BLD AUTO: 41.9 % (ref 40–53)
HGB BLD-MCNC: 14.1 G/DL (ref 13.3–17.7)
IMM GRANULOCYTES # BLD: 0.1 10E9/L (ref 0–0.4)
IMM GRANULOCYTES NFR BLD: 0.9 %
LYMPHOCYTES # BLD AUTO: 2.6 10E9/L (ref 0.8–5.3)
LYMPHOCYTES NFR BLD AUTO: 33 %
MCH RBC QN AUTO: 31.2 PG (ref 26.5–33)
MCHC RBC AUTO-ENTMCNC: 33.7 G/DL (ref 31.5–36.5)
MCV RBC AUTO: 93 FL (ref 78–100)
MONOCYTES # BLD AUTO: 0.5 10E9/L (ref 0–1.3)
MONOCYTES NFR BLD AUTO: 6.5 %
NEUTROPHILS # BLD AUTO: 4.3 10E9/L (ref 1.6–8.3)
NEUTROPHILS NFR BLD AUTO: 55.7 %
NRBC # BLD AUTO: 0 10*3/UL
NRBC BLD AUTO-RTO: 0 /100
PLATELET # BLD AUTO: 269 10E9/L (ref 150–450)
POTASSIUM SERPL-SCNC: 3.4 MMOL/L (ref 3.4–5.3)
RBC # BLD AUTO: 4.52 10E12/L (ref 4.4–5.9)
SODIUM SERPL-SCNC: 140 MMOL/L (ref 133–144)
WBC # BLD AUTO: 7.8 10E9/L (ref 4–11)

## 2018-04-20 PROCEDURE — 25000125 ZZHC RX 250: Performed by: EMERGENCY MEDICINE

## 2018-04-20 PROCEDURE — 25000128 H RX IP 250 OP 636: Performed by: EMERGENCY MEDICINE

## 2018-04-20 PROCEDURE — 99285 EMERGENCY DEPT VISIT HI MDM: CPT | Mod: 25 | Performed by: EMERGENCY MEDICINE

## 2018-04-20 PROCEDURE — 99285 EMERGENCY DEPT VISIT HI MDM: CPT | Mod: Z6 | Performed by: EMERGENCY MEDICINE

## 2018-04-20 PROCEDURE — 70450 CT HEAD/BRAIN W/O DYE: CPT

## 2018-04-20 PROCEDURE — 80048 BASIC METABOLIC PNL TOTAL CA: CPT | Performed by: EMERGENCY MEDICINE

## 2018-04-20 PROCEDURE — 85025 COMPLETE CBC W/AUTO DIFF WBC: CPT | Performed by: EMERGENCY MEDICINE

## 2018-04-20 PROCEDURE — 74177 CT ABD & PELVIS W/CONTRAST: CPT

## 2018-04-20 RX ORDER — IOPAMIDOL 755 MG/ML
105 INJECTION, SOLUTION INTRAVASCULAR ONCE
Status: COMPLETED | OUTPATIENT
Start: 2018-04-20 | End: 2018-04-20

## 2018-04-20 RX ADMIN — IOPAMIDOL 105 ML: 755 INJECTION, SOLUTION INTRAVENOUS at 02:50

## 2018-04-20 RX ADMIN — SODIUM CHLORIDE 77 ML: 9 INJECTION, SOLUTION INTRAVENOUS at 02:49

## 2018-04-20 SDOH — ECONOMIC STABILITY - HOUSING INSECURITY: HOMELESSNESS UNSPECIFIED: Z59.00

## 2018-04-20 ASSESSMENT — ENCOUNTER SYMPTOMS
CONFUSION: 0
HEADACHES: 1
EYE DISCHARGE: 0
WEAKNESS: 0
FEVER: 0
MYALGIAS: 0
RHINORRHEA: 0
CHILLS: 0
SORE THROAT: 0
ABDOMINAL PAIN: 1
DIARRHEA: 0
BRUISES/BLEEDS EASILY: 0
SHORTNESS OF BREATH: 0
FLANK PAIN: 0
BACK PAIN: 0
VOMITING: 0
DYSURIA: 0
COUGH: 0
NAUSEA: 0
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
## 2018-04-20 NOTE — ED AVS SNAPSHOT
[facility], [place], Emergency Department    [address] [place] [id number]    Phone:  [phone]                                       Andrew Lockwood   MRN: [id number]    Department:  [facility], Emergency Department   Date of Visit:  4/20/2018           After Visit Summary Signature Page     I have received my discharge instructions, and my questions have been answered. I have discussed any challenges I see with this plan with the nurse or doctor.    ..........................................................................................................................................  Patient/Patient Representative Signature      ..........................................................................................................................................  Patient Representative Print Name and Relationship to Patient    ..................................................               ................................................  Date                                            Time    ..........................................................................................................................................  Reviewed by Signature/Title    ...................................................              ..............................................  Date                                                            Time

## 2018-04-20 NOTE — ED NOTES
Initial Assessment: Pt has c/o stomach pain. No pain meds requested. No apparent distress. Pt is watching T.V. and in good spirits. Awaiting C.T.
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
## 2018-04-20 NOTE — DISCHARGE INSTRUCTIONS
Thank you for your patience today.  Please follow-up with your regular doctor in the next 2-3 days for further evaluation and follow-up care.  Please call to schedule an appointment.  Please continue your own medications and follow your prior discharge instructions. Please return to the ER if you develop high fever, persistent vomiting, severe pain, or any worsening of your current symptoms.  It was a pleasure taking care of you today.  We hope you feel better soon.

## 2018-04-20 NOTE — ED AVS SNAPSHOT
[facility], Emergency Department    [address] [address]    Phone:  [phone]                                       Andrew Lockwood   MRN: [id number]    Department:  [facility], Emergency Department   Date of Visit:  4/20/2018           Patient Information     Date Of Birth          11/27/1967        Your diagnoses for this visit were:     Abdominal pain, generalized     Contusion of abdominal wall, initial encounter     Closed head injury, initial encounter     Homelessness        You were seen by Sheila Bruno MD.        Discharge Instructions       Thank you for your patience today.  Please follow-up with your regular doctor in the next 2-3 days for further evaluation and follow-up care.  Please call to schedule an appointment.  Please continue your own medications and follow your prior discharge instructions. Please return to the ER if you develop high fever, persistent vomiting, severe pain, or any worsening of your current symptoms.  It was a pleasure taking care of you today.  We hope you feel better soon.      Your next 10 appointments already scheduled     May 01, 2018  3:40 PM CDT   (Arrive by 3:25 PM)   Return Visit with Dawn Haq MD   [facility] ([facility])    [address] [place] [id number] [phone]              24 Hour Appointment Hotline       To make an appointment at any [facility], call [phone] ([phone]). If you don't have a family doctor or clinic, we will help you find one. [facility] are conveniently located to serve the needs of you and your family.             Review of your medicines      Our records show that you are taking the medicines listed below. If these are incorrect, please call your family doctor or clinic.        Dose / Directions Last dose taken    acetaminophen 325 MG tablet   Commonly known as:  TYLENOL   Dose:  325-650 mg   Quantity:   60 tablet        Take 1-2 tablets (325-650 mg) by mouth every 6 hours as needed for mild pain   Refills:  0        alum & mag hydroxide-simethicone 400-400-40 MG/5ML Susp suspension   Commonly known as:  MAALOX ADVANCED MAX ST   Dose:  30 mL   Quantity:  355 mL        Take 30 mLs by mouth every 4 hours as needed for indigestion   Refills:  0        blood glucose lancets standard   Commonly known as:  no brand specified   Quantity:  100 Box        Use to test blood sugar four times daily or as directed.   Refills:  5        * blood glucose monitoring meter device kit   Commonly known as:  no brand specified   Quantity:  1 kit        Use to test blood sugar four times daily or as directed.   Refills:  0        * blood glucose monitoring meter device kit   Commonly known as:  no brand specified   Quantity:  1 kit        Use to test blood sugar 4 times daily or as directed.   Refills:  0        * blood glucose monitoring test strip   Commonly known as:  no brand specified   Quantity:  100 each        Use to test blood sugars four times daily or as directed   Refills:  5        * blood glucose monitoring test strip   Commonly known as:  no brand specified   Dose:  1 strip   Quantity:  100 strip        1 strip by In Vitro route 4 times daily (before meals and nightly) Use to test blood sugars 4 times daily or as directed   Refills:  11        Fcpkobr-Rffua-Cllhovtb-TenofAF 030-484-966-10 MG Tabs per tablet   Commonly known as:  GENVOYA   Dose:  1 tablet   Indication:  Positive HIV Test   Quantity:  30 tablet        Take 1 tablet by mouth daily (with breakfast)   Refills:  0        fluticasone 50 MCG/ACT spray   Commonly known as:  FLONASE   Dose:  2 spray   Quantity:  9.9 mL        Spray 2 sprays into both nostrils 2 times daily   Refills:  3        ibuprofen 600 MG tablet   Commonly known as:  ADVIL/MOTRIN   Dose:  600 mg   Quantity:  60 tablet        Take 1 tablet (600 mg) by mouth every 8 hours as needed for moderate  pain   Refills:  0        insulin pen needle 31G X 8 MM   Commonly known as:  B-D U/F   Quantity:  100 each        Use 1 pen needles daily or as directed.   Refills:  0        omeprazole 20 MG CR capsule   Commonly known as:  priLOSEC   Quantity:  90 capsule        TAKE ONE CAPSULE BY MOUTH EVERY DAY   Refills:  1        oxyCODONE IR 5 MG tablet   Commonly known as:  ROXICODONE   Dose:  5 mg   Quantity:  15 tablet        Take 1 tablet (5 mg) by mouth every 4 hours as needed for moderate to severe pain   Refills:  0        senna 8.6 MG tablet   Commonly known as:  SENOKOT   Dose:  1 tablet   Quantity:  10 tablet        Take 1 tablet by mouth daily as needed for constipation   Refills:  0        simethicone 125 MG Chew chewable tablet   Commonly known as:  GAS-X EXTRA STRENGTH   Dose:  125 mg   Quantity:  120 tablet        Take 1 tablet (125 mg) by mouth 2 times daily   Refills:  0        sitagliptin 50 MG tablet   Commonly known as:  JANUVIA   Dose:  50 mg   Quantity:  90 tablet        Take 1 tablet (50 mg) by mouth daily   Refills:  3        * Notice:  This list has 4 medication(s) that are the same as other medications prescribed for you. Read the directions carefully, and ask your doctor or other care provider to review them with you.            Procedures and tests performed during your visit     Basic metabolic panel    CBC with platelets differential    CT Abdomen Pelvis w Contrast    CT Head w/o Contrast      Orders Needing Specimen Collection     None      Pending Results     Date and Time Order Name Status Description    4/20/2018 0134 CT Abdomen Pelvis w Contrast Preliminary     4/20/2018 0134 CT Head w/o Contrast Preliminary             Pending Culture Results     No orders found from 4/18/2018 to 4/21/2018.            Pending Results Instructions     If you had any lab results that were not finalized at the time of your Discharge, you can call the ED Lab Result RN at [phone]. You will be contacted by  this team for any positive Lab results or changes in treatment. The nurses are available 7 days a week from 10A to 6:30P.  You can leave a message 24 hours per day and they will return your call.        Thank you for choosing [facility]       Thank you for choosing [facility] for your care. Our goal is always to provide you with excellent care. Hearing back from our patients is one way we can continue to improve our services. Please take a few minutes to complete the written survey that you may receive in the mail after you visit with us. Thank you!        [facility] Information     [employer] gives you secure access to your electronic health record. If you see a primary care provider, you can also send messages to your care team and make appointments. If you have questions, please call your primary care clinic.  If you do not have a primary care provider, please call [phone] and they will assist you.        Care EveryWhere ID     This is your Care EveryWhere ID. This could be used by other organizations to access your [facility] medical records  [id number]        Equal Access to Services     RA JORDAN : [facility], warenettada taryn, qaybta kaalmaadeline reyna, dez moreno . So [facility] [phone].    ATENCIÓN: Si habla español, tiene a kohli disposición servicios gratuitos de asistencia lingüística. Llame al [phone].    We comply with applicable federal civil rights laws and [place] laws. We do not discriminate on the basis of race, color, national origin, age, disability, sex, sexual orientation, or gender identity.            After Visit Summary       This is your record. Keep this with you and show to your community pharmacist(s) and doctor(s) at your next visit.

## 2018-04-25 ENCOUNTER — HOSPITAL ENCOUNTER (EMERGENCY)
Facility: CLINIC | Age: 53
Discharge: HOME | End: 2018-04-25
Payer: MEDICAID

## 2018-04-25 VITALS
RESPIRATION RATE: 18 BRPM | OXYGEN SATURATION: 99 % | TEMPERATURE: 97.7 F | DIASTOLIC BLOOD PRESSURE: 90 MMHG | SYSTOLIC BLOOD PRESSURE: 132 MMHG

## 2018-04-25 DIAGNOSIS — E11.9 TYPE 2 DIABETES MELLITUS WITHOUT COMPLICATION, WITHOUT LONG-TERM CURRENT USE OF INSULIN (H): ICD-10-CM

## 2018-04-25 NOTE — ED NOTES
Pt states that he made an appt with his doctor tomorrow and desires to leave the ED without being seen. RN offers pt to be seen but pt states that his own judgement is that he can wait until tomorrow to be seen. Pt instructed to return to ED at anytime if he feels the need. Pt verbalized understanding.  DMSE form signed prior to departure

## 2018-04-25 NOTE — ED TRIAGE NOTES
Pt comes to ER with abdominal burning and cramps. Pt has had this pain off and on for 3.5yrs however pain increased today. Pt states pain radiates to back. Denies other sx.

## 2018-04-26 ENCOUNTER — OFFICE VISIT (OUTPATIENT)
Dept: EDUCATION SERVICES | Facility: CLINIC | Age: 53
End: 2018-04-26
Payer: MEDICAID

## 2018-04-26 ENCOUNTER — OFFICE VISIT (OUTPATIENT)
Dept: INFECTIOUS DISEASES | Facility: CLINIC | Age: 53
End: 2018-04-26
Attending: INTERNAL MEDICINE
Payer: COMMERCIAL

## 2018-04-26 VITALS
TEMPERATURE: 98.1 F | DIASTOLIC BLOOD PRESSURE: 81 MMHG | HEART RATE: 95 BPM | BODY MASS INDEX: 29.87 KG/M2 | OXYGEN SATURATION: 98 % | WEIGHT: 174 LBS | SYSTOLIC BLOOD PRESSURE: 113 MMHG

## 2018-04-26 DIAGNOSIS — E11.65 TYPE 2 DIABETES MELLITUS WITH HYPERGLYCEMIA, WITHOUT LONG-TERM CURRENT USE OF INSULIN (H): Primary | ICD-10-CM

## 2018-04-26 DIAGNOSIS — B20 HIV DISEASE (H): ICD-10-CM

## 2018-04-26 DIAGNOSIS — E11.9 TYPE 2 DIABETES MELLITUS (H): Primary | ICD-10-CM

## 2018-04-26 DIAGNOSIS — B20 HIV DISEASE (H): Primary | ICD-10-CM

## 2018-04-26 LAB
ALBUMIN SERPL-MCNC: 3.6 G/DL (ref 3.4–5)
ALP SERPL-CCNC: 112 U/L (ref 40–150)
ALT SERPL W P-5'-P-CCNC: 20 U/L (ref 0–70)
ANION GAP SERPL CALCULATED.3IONS-SCNC: 8 MMOL/L (ref 3–14)
AST SERPL W P-5'-P-CCNC: 16 U/L (ref 0–45)
BILIRUB SERPL-MCNC: 0.4 MG/DL (ref 0.2–1.3)
BUN SERPL-MCNC: 11 MG/DL (ref 7–30)
CALCIUM SERPL-MCNC: 9.2 MG/DL (ref 8.5–10.1)
CD3 CELLS # BLD: 1311 CELLS/UL (ref 603–2990)
CD3 CELLS NFR BLD: 50 % (ref 49–84)
CD3+CD4+ CELLS # BLD: 299 CELLS/UL (ref 441–2156)
CD3+CD4+ CELLS NFR BLD: 11 % (ref 28–63)
CD3+CD4+ CELLS/CD3+CD8+ CLL BLD: 0.31 % (ref 1.4–2.6)
CD3+CD8+ CELLS # BLD: 953 CELLS/UL (ref 125–1312)
CD3+CD8+ CELLS NFR BLD: 36 % (ref 10–40)
CHLORIDE SERPL-SCNC: 105 MMOL/L (ref 94–109)
CO2 SERPL-SCNC: 25 MMOL/L (ref 20–32)
CREAT SERPL-MCNC: 0.85 MG/DL (ref 0.66–1.25)
ERYTHROCYTE [DISTWIDTH] IN BLOOD BY AUTOMATED COUNT: 13.2 % (ref 10–15)
GFR SERPL CREATININE-BSD FRML MDRD: >90 ML/MIN/1.7M2
GLUCOSE SERPL-MCNC: 149 MG/DL (ref 70–99)
HCT VFR BLD AUTO: 40.1 % (ref 40–53)
HGB BLD-MCNC: 13.6 G/DL (ref 13.3–17.7)
IFC SPECIMEN: ABNORMAL
MCH RBC QN AUTO: 30.9 PG (ref 26.5–33)
MCHC RBC AUTO-ENTMCNC: 33.9 G/DL (ref 31.5–36.5)
MCV RBC AUTO: 91 FL (ref 78–100)
PLATELET # BLD AUTO: 286 10E9/L (ref 150–450)
POTASSIUM SERPL-SCNC: 3.9 MMOL/L (ref 3.4–5.3)
PROT SERPL-MCNC: 7.3 G/DL (ref 6.8–8.8)
RBC # BLD AUTO: 4.4 10E12/L (ref 4.4–5.9)
SODIUM SERPL-SCNC: 138 MMOL/L (ref 133–144)
WBC # BLD AUTO: 6.6 10E9/L (ref 4–11)

## 2018-04-26 PROCEDURE — 85027 COMPLETE CBC AUTOMATED: CPT | Performed by: INTERNAL MEDICINE

## 2018-04-26 PROCEDURE — 36415 COLL VENOUS BLD VENIPUNCTURE: CPT | Performed by: INTERNAL MEDICINE

## 2018-04-26 PROCEDURE — 86359 T CELLS TOTAL COUNT: CPT | Performed by: INTERNAL MEDICINE

## 2018-04-26 PROCEDURE — 80053 COMPREHEN METABOLIC PANEL: CPT | Performed by: INTERNAL MEDICINE

## 2018-04-26 PROCEDURE — G0463 HOSPITAL OUTPT CLINIC VISIT: HCPCS | Mod: ZF

## 2018-04-26 PROCEDURE — 86360 T CELL ABSOLUTE COUNT/RATIO: CPT | Performed by: INTERNAL MEDICINE

## 2018-04-26 PROCEDURE — 87536 HIV-1 QUANT&REVRSE TRNSCRPJ: CPT | Performed by: INTERNAL MEDICINE

## 2018-04-26 RX ORDER — INSULIN GLARGINE 100 [IU]/ML
INJECTION, SOLUTION SUBCUTANEOUS
Qty: 15 ML | Refills: 3 | Status: SHIPPED | OUTPATIENT
Start: 2018-04-26 | End: 2018-07-27 | Stop reason: ALTCHOICE

## 2018-04-26 ASSESSMENT — PAIN SCALES - GENERAL: PAINLEVEL: NO PAIN (0)

## 2018-04-26 NOTE — LETTER
4/26/2018       RE: Andrew Lockwood  [address]   [facility] [address]     Dear Colleague,    Thank you for referring your patient, Andrew Lockwood, to the [facility] INFECTIOUS DISEASES at [facility]. Please see a copy of my visit note below.    [facility] - HIV Progress Note  Dr. Sharon Rosado, [facility], [facility], [address] [place], Sixth Floor, [address]  [place] [address]  Patient:  Andrew Lockwood, Date of birth 11/27/1967, Medical record number [id number]  Date of Visit: Apr 26, 2018         Assessment and Recommendations:     HIV  Mr. Lockwood had a detectable viral load at his last check.  He reports that this was right after he was in senior living and there he was not getting his medications. I will check his viral load again today and we will order a pheno/jolynn to be done in the case that it was detectable.     Preventative health care  Cardiovascular Juan Jose -               Lipids - CHOL 180, LDL 72 (8/18/2016)              Blood Pressure -  /81   Cancer Screening              Colon - Up to date. [facility]   Anal - Previous anal pap with insufficient cells.  He has been referred to colorectal, but he has not yet made an appointment  Immunizations              Hepatitis A - immune, serology 4/13/15              Hepatitis B - 6/16/2016, 3/10/2016, 8/31/2017              Influenza - Up To date              Pneumovax/Prevnar - Up to date               Tdap - 6/8/14 per [facility]              Menactra - Up to date.    Renal Health -  UA without proteinuria and creatinine normal in 12/2016.   Sexually Transmitted Infection Risk and Screening              Gonorrhea and Chlamydia - GC/chlamydia negative on 3/2017, declined testing today, he has a new boyfriend but says they are not sexually active.               Syphilis - negative 3/2017,   declined testing today    I spent more than 15 minutes today in face to face time managing the care of Andrew Lockwood.  Over 50% of my time on the unit was spent counseling the patient and/or coordinating care regarding services listed in this note.    Sharon Rosado MD  Division of Infectious Diseases and International Medicine  [phone]         History of Infectious Disease Illness:   Mr. Lockwood is a 50 year old gentleman who I follow for HIV.  He came by the clinic today and based on his recent detectable HIV viral load, I wanted to discuss how his antiretroviral treatment is going.  Mr. Lockwood has had periods of poor adherence in the past that have led to recurrence of his toxoplasmosis.  Discussing his ART history, he says today that he is very adherent.  He only occasionally misses doses.  He does, as he frequently does, wonder whether he is cured of his HIV. He thinks that the viral load elevation was probably around the time that he was incarcerated.  He was unable to get his medications during that time.         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 They did not live together.  Historical use of ARVs: Triumeq, Genvoya  Historical Resistance Mutations: Susceptible        Past Medical and Surgical History:     Past Medical History:   Diagnosis Date     AIDS (H)      Allergic rhinitis due to other allergen     DNS     Chronic abdominal pain      CNS toxoplasmosis (H)      Diabetes type 2, controlled (H)      GERD (gastroesophageal reflux disease)      HIV (human immunodeficiency virus infection) (H)      Periungual wart       Sleep apnea     doesn't use CPAP       Past Surgical History:   Procedure Laterality Date     C NONSPECIFIC PROCEDURE      right forearm fracture     COLONOSCOPY Left 1/22/2016    Procedure: COMBINED COLONOSCOPY, SINGLE OR MULTIPLE BIOPSY/POLYPECTOMY BY BIOPSY;  Surgeon: Clark Saini MD;  Location: [facility]     HC EXPLORE UNDESC TESTIS,INGUIN/SCROTAL       LAPAROSCOPIC APPENDECTOMY N/A 1/31/2018    Procedure: LAPAROSCOPIC APPENDECTOMY;  LAPAROSCOPIC APPENDECTOMY;  Surgeon: Dawn Holt MD;  Location: [facility] OR     LAPAROSCOPY DIAGNOSTIC (GENERAL) N/A 7/26/2016    Procedure: LAPAROSCOPY DIAGNOSTIC (GENERAL);  Surgeon: Susannah Arraiga MD;  Location: [facility] OR     LAPAROSCOPY DIAGNOSTIC (GENERAL) N/A 4/16/2018    Procedure: LAPAROSCOPY DIAGNOSTIC (GENERAL);  Diagnostic laparoscopy and lysis of adhesions;  Surgeon: Prince Dowling MD;  Location: [facility] OR     OPTICAL TRACKING SYSTEM CRANIOTOMY, EXCISE TUMOR, COMBINED Left 4/10/2015    Procedure: COMBINED OPTICAL TRACKING SYSTEM CRANIOTOMY, EXCISE TUMOR;  Surgeon: Mirlande Colmenares MD;  Location: [facility] OR     REPAIR GAMEKEEPER'S THUMB Right 12/2/2016    Procedure: REPAIR LIGAMENT ULNAR COLLATERAL THUMB (GAMEKEEPER'S);  Surgeon: Evin Zamorano MD;  Location: [facility]           Family History:     Family History   Problem Relation Age of Onset     DIABETES Brother      DIABETES Father      Alzheimer Disease Father      Unknown/Adopted Mother      DIABETES Paternal Grandfather      CANCER No family hx of      no skin cancer     Skin Cancer No family hx of      no famiy hx of skin cancer     Glaucoma No family hx of      Macular Degeneration No family hx of            Social History:     Social History   Substance Use Topics     Smoking status: Current Every Day Smoker     Packs/day: 0.20     Last attempt to quit: 10/28/2016     Smokeless tobacco: Former User      Comment: 4 cigarettes     Alcohol use No      Comment: Last etoh in 2007     Social  History     Social History Narrative    Born in [facility] [place].  [place] the [place] in 1993.  Last traveled to visit family in 2008.                 Review of Systems:   CONSTITUTIONAL: negative fevers or chills   GASTROINTESTINAL: negative for nausea, vomiting, diarrhea or constipation   INTEGUMENT: negative for rash or pruritus  NEURO: Negative for headache         Current Medications:     Current Outpatient Prescriptions   Medication Sig Dispense Refill     acetaminophen (TYLENOL) 325 MG tablet Take 1-2 tablets (325-650 mg) by mouth every 6 hours as needed for mild pain 60 tablet 0     blood glucose (NO BRAND SPECIFIED) lancets standard Use to test blood sugar four times daily or as directed. 100 Box 5     blood glucose monitoring (GELACIO CONTOUR NEXT) test strip Use to test blood sugar 2 times daily or as directed.  Ok to substitute alternative if insurance prefers. 100 strip 11     blood glucose monitoring (GELACIO MICROLET) lancets Use to test blood sugar 2 times daily or as directed.  Ok to substitute alternative if insurance prefers. 100 each 11     blood glucose monitoring (NO BRAND SPECIFIED) meter device kit Use to test blood sugar four times daily or as directed. 1 kit 0     blood glucose monitoring (NO BRAND SPECIFIED) meter device kit Use to test blood sugar 4 times daily or as directed. 1 kit 0     blood glucose monitoring (NO BRAND SPECIFIED) test strip Use to test blood sugars four times daily or as directed 100 each 5     blood glucose monitoring (NO BRAND SPECIFIED) test strip 1 strip by In Vitro route 4 times daily (before meals and nightly) Use to test blood sugars 4 times daily or as directed 100 strip 11     GENVOYA [phone] mg tablet Take 1 tablet by mouth daily (with breakfast) 30 tablet 0     insulin glargine (LANTUS SOLOSTAR) 100 UNIT/ML injection Take 7 units each morning 15 mL 3     insulin pen needle (B-D U/F) 31G X 8 MM Use 1 pen needles daily or as directed. 100 each 0     insulin  pen needle (BD SCOTT U/F) 32G X 4 MM Use 1 daily as directed. 100 each 11     omeprazole (PRILOSEC) 20 MG CR capsule TAKE ONE CAPSULE BY MOUTH EVERY DAY 90 capsule 1            Immunization History:     Immunization History   Administered Date(s) Administered     HepB 03/10/2016, 06/16/2016     HepB-Adult 08/31/2017     Influenza (IIV3) PF 10/17/2016     Influenza Vaccine IM 3yrs+ 4 Valent IIV4 12/22/2015, 12/14/2017     Mantoux Tuberculin Skin Test 04/12/2015     Meningococcal (Menactra ) 08/31/2015, 03/10/2016     Pneumo Conj 13-V (2010&after) 03/10/2016     Pneumococcal 23 valent 06/16/2016     TDAP Vaccine (Boostrix) 10/17/2016            Allergies:     Allergies   Allergen Reactions     Metformin      Abdominal pain     Dulaglutide Rash            Physical Exam:   /81  Pulse 95  Temp 98.1  F (36.7  C) (Oral)  Wt 78.9 kg (174 lb)  SpO2 98%  BMI 29.87 kg/m2  Physical Examination:  GENERAL:  well-developed, well-nourished, seated in no acute distress.  HEENT:  Head is normocephalic, atraumatic   EYES:  Eyes have anicteric sclerae without conjunctival injection   SKIN:  No acute rashes. .   NEUROLOGIC:  Grossly nonfocal. Active x4 extremities          Laboratory Data:     CD4 Count  Absolute CD4   Date Value Ref Range Status   03/07/2018 256 (L) 441 - 2156 cells/uL Final     CD4 Prescott T   Date Value Ref Range Status   03/07/2018 10 (L) 28 - 63 % Final     CD4:CD8 Ratio   Date Value Ref Range Status   03/07/2018 0.24 (L) 1.40 - 2.60 Final       HIV-1 RNA Quantitative:  HIV-1 RNA Quant Result   Date Value Ref Range Status   03/07/2018 36979 (A) HIVND^HIV-1 RNA Not Detected [Copies]/mL Final     Comment:     The DAMON AmpliPrep/DAMON TaqMan HIV-1 test is an FDA-approved in vitro   nucleic acid amplification test for the quantitation of HIV-1 RNA in human   plasma (EDTA plasma) using the DAMON AmpliPrep instrument for automated viral   nucleic acid extraction and the DAMON TaqMan Analyzer or Billetto for    automated Real Time PCR amplification and detection of the viral nucleic acid   target.  Titer results are reported in copies/ml. This assay is intended for use in   conjunction with clinical presentation and other laboratory markers of disease   prognosis and for use as an aid in assessing viral response to antiretroviral   treatment as measured by changes in plasma HIV-1 RNA levels. This test should   not be used as a donor screening test to confirm the presence of HIV-1   infection.         Metabolic Studies       Sodium   Date Value Ref Range Status   04/20/2018 140 133 - 144 mmol/L Final   04/18/2018 140 133 - 144 mmol/L Final     Potassium   Date Value Ref Range Status   04/20/2018 3.4 3.4 - 5.3 mmol/L Final   04/18/2018 3.5 3.4 - 5.3 mmol/L Final     Chloride   Date Value Ref Range Status   04/20/2018 105 94 - 109 mmol/L Final   04/18/2018 108 94 - 109 mmol/L Final     Carbon Dioxide   Date Value Ref Range Status   04/20/2018 26 20 - 32 mmol/L Final   04/18/2018 25 20 - 32 mmol/L Final     Anion Gap   Date Value Ref Range Status   04/20/2018 9 3 - 14 mmol/L Final   04/18/2018 7 3 - 14 mmol/L Final     Urea Nitrogen   Date Value Ref Range Status   04/20/2018 13 7 - 30 mg/dL Final   04/18/2018 9 7 - 30 mg/dL Final     Creatinine   Date Value Ref Range Status   04/20/2018 0.89 0.66 - 1.25 mg/dL Final   04/18/2018 0.72 0.66 - 1.25 mg/dL Final     GFR Estimate   Date Value Ref Range Status   04/20/2018 90 >60 mL/min/1.7m2 Final     Comment:     Non  GFR Calc   04/18/2018 >90 >60 mL/min/1.7m2 Final     Comment:     Non  GFR Calc     Glucose   Date Value Ref Range Status   04/20/2018 135 (H) 70 - 99 mg/dL Final   04/18/2018 117 (H) 70 - 99 mg/dL Final     Hemoglobin A1C   Date Value Ref Range Status   08/11/2017 6.0 4.3 - 6.0 % Final     Calcium   Date Value Ref Range Status   04/20/2018 8.9 8.5 - 10.1 mg/dL Final   04/18/2018 8.6 8.5 - 10.1 mg/dL Final     Phosphorus   Date Value  Ref Range Status   04/18/2018 2.8 2.5 - 4.5 mg/dL Final   04/17/2018 2.9 2.5 - 4.5 mg/dL Final     Magnesium   Date Value Ref Range Status   04/18/2018 2.0 1.6 - 2.3 mg/dL Final   04/17/2018 1.9 1.6 - 2.3 mg/dL Final     Lactic Acid   Date Value Ref Range Status   04/16/2018 1.0 0.7 - 2.0 mmol/L Final   01/24/2018 1.1 0.4 - 2.0 mmol/L Final       Hepatic Studies    Bilirubin Total   Date Value Ref Range Status   04/16/2018 0.5 0.2 - 1.3 mg/dL Final   02/18/2018 0.3 0.2 - 1.3 mg/dL Final     Alkaline Phosphatase   Date Value Ref Range Status   04/16/2018 98 40 - 150 U/L Final   02/18/2018 125 40 - 150 U/L Final     Albumin   Date Value Ref Range Status   04/16/2018 4.0 3.4 - 5.0 g/dL Final   02/18/2018 3.5 3.4 - 5.0 g/dL Final     AST   Date Value Ref Range Status   04/16/2018 19 0 - 45 U/L Final   02/18/2018 26 0 - 45 U/L Final     ALT   Date Value Ref Range Status   04/16/2018 20 0 - 70 U/L Final   02/18/2018 30 0 - 70 U/L Final       Hematology Studies      WBC   Date Value Ref Range Status   04/20/2018 7.8 4.0 - 11.0 10e9/L Final   04/18/2018 7.9 4.0 - 11.0 10e9/L Final     Absolute Neutrophil   Date Value Ref Range Status   04/20/2018 4.3 1.6 - 8.3 10e9/L Final   04/16/2018 6.9 1.6 - 8.3 10e9/L Final     Absolute Lymphocytes   Date Value Ref Range Status   04/20/2018 2.6 0.8 - 5.3 10e9/L Final   04/16/2018 3.8 0.8 - 5.3 10e9/L Final     Absolute Monocytes   Date Value Ref Range Status   04/20/2018 0.5 0.0 - 1.3 10e9/L Final   04/16/2018 0.8 0.0 - 1.3 10e9/L Final     Absolute Eosinophils   Date Value Ref Range Status   04/20/2018 0.3 0.0 - 0.7 10e9/L Final   04/16/2018 0.4 0.0 - 0.7 10e9/L Final     Hemoglobin   Date Value Ref Range Status   04/20/2018 14.1 13.3 - 17.7 g/dL Final   04/18/2018 12.4 (L) 13.3 - 17.7 g/dL Final     Hematocrit   Date Value Ref Range Status   04/20/2018 41.9 40.0 - 53.0 % Final   04/18/2018 37.9 (L) 40.0 - 53.0 % Final     Platelet Count   Date Value Ref Range Status   04/20/2018 269  150 - 450 10e9/L Final   04/18/2018 248 150 - 450 10e9/L Final       Hepatitis B Testing     Recent Labs   Lab Test  04/13/15   0816   HBCAB  Nonreactive   HEPBANG  Nonreactive   HBCM  Nonreactive   A nonreactive result suggests lack of recent exposure to the virus in the   preceding 6 months.     HBEABY  Negative  Reference range: Negative  (Note)  Performed by [facility],  [address] [place] [address] [phone]  [email or web address], Nayan Corley MD, Lab. Director     HBEAGN  Negative  Reference range: Negative  (Note)  Performed by [facility],  [address], [facility] [place] [address] [phone]  [email or web address], Nayan Corley MD, Lab. Director         Hepatitis C Testing     Hepatitis C Antibody   Date Value Ref Range Status   03/02/2017  NR Final    Nonreactive   Assay performance characteristics have not been established for newborns,   infants, and children     04/13/2015  NR Final    Nonreactive   Assay performance characteristics have not been established for newborns,   infants, and children       Imaging:  No results found for this or any previous visit (from the past 744 hour(s)).      Again, thank you for allowing me to participate in the care of your patient.      Sincerely,    Sharon Rosado MD

## 2018-04-26 NOTE — LETTER
4/26/2018      RE: Andrew Lockwood  [address]   [facility] [address]       [facility] - HIV Progress Note  Dr. Sharon Rosado, [facility], [facility], [address], Sixth Floor, [address]  [place] [address]  Patient:  Andrew Lockwood, Date of birth 11/27/1967, Medical record number [id number]  Date of Visit: Apr 26, 2018         Assessment and Recommendations:     HIV  Mr. Lockwood had a detectable viral load at his last check.  He reports that this was right after he was in [place] and there he was not getting his medications. I will check his viral load again today and we will order a pheno/jolynn to be done in the case that it was detectable.     Preventative health care  Cardiovascular Juan Jose -               Lipids - CHOL 180, LDL 72 (8/18/2016)              Blood Pressure -  /81   Cancer Screening              Colon - Up to date. [facility]   Anal - Previous anal pap with insufficient cells.  He has been referred to colorectal, but he has not yet made an appointment  Immunizations              Hepatitis A - immune, serology 4/13/15              Hepatitis B - 6/16/2016, 3/10/2016, 8/31/2017              Influenza - Up To date              Pneumovax/Prevnar - Up to date               Tdap - 6/8/14 per [facility]              Menactra - Up to date.    Renal Health -  UA without proteinuria and creatinine normal in 12/2016.   Sexually Transmitted Infection Risk and Screening              Gonorrhea and Chlamydia - GC/chlamydia negative on 3/2017, declined testing today, he has a new boyfriend but says they are not sexually active.               Syphilis - negative 3/2017,  declined testing today    I spent more than 15 minutes today in face to face time managing the care of Andrew Lockwood.  Over 50% of my time on the unit was spent counseling the patient and/or coordinating care regarding services  listed in this note.    Sharon Rosado MD  Division of Infectious Diseases and International Medicine  [phone]         History of Infectious Disease Illness:   Mr. Lockwood is a 50 year old gentleman who I follow for HIV.  He came by the clinic today and based on his recent detectable HIV viral load, I wanted to discuss how his antiretroviral treatment is going.  Mr. Lockwood has had periods of poor adherence in the past that have led to recurrence of his toxoplasmosis.  Discussing his ART history, he says today that he is very adherent.  He only occasionally misses doses.  He does, as he frequently does, wonder whether he is cured of his HIV. He thinks that the viral load elevation was probably around the time that he was incarcerated.  He was unable to get his medications during that time.         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 They did not live together.  Historical use of ARVs: Triumeq, Genvoya  Historical Resistance Mutations: Susceptible        Past Medical and Surgical History:     Past Medical History:   Diagnosis Date     AIDS (H)      Allergic rhinitis due to other allergen     DNS     Chronic abdominal pain      CNS toxoplasmosis (H)      Diabetes type 2, controlled (H)      GERD (gastroesophageal reflux disease)      HIV (human immunodeficiency virus infection) (H)      Periungual wart      Sleep apnea     doesn't use CPAP       Past Surgical History:   Procedure Laterality Date     C NONSPECIFIC PROCEDURE      right forearm fracture     COLONOSCOPY Left 1/22/2016    Procedure: COMBINED COLONOSCOPY, SINGLE OR  MULTIPLE BIOPSY/POLYPECTOMY BY BIOPSY;  Surgeon: Clark Saini MD;  Location: [facility]     HC EXPLORE UNDESC TESTIS,INGUIN/SCROTAL       LAPAROSCOPIC APPENDECTOMY N/A 1/31/2018    Procedure: LAPAROSCOPIC APPENDECTOMY;  LAPAROSCOPIC APPENDECTOMY;  Surgeon: Dawn Holt MD;  Location: [facility]     LAPAROSCOPY DIAGNOSTIC (GENERAL) N/A 7/26/2016    Procedure: LAPAROSCOPY DIAGNOSTIC (GENERAL);  Surgeon: Susannah Arriaga MD;  Location: [facility]     LAPAROSCOPY DIAGNOSTIC (GENERAL) N/A 4/16/2018    Procedure: LAPAROSCOPY DIAGNOSTIC (GENERAL);  Diagnostic laparoscopy and lysis of adhesions;  Surgeon: Prince Dowling MD;  Location: [facility] OR     OPTICAL TRACKING SYSTEM CRANIOTOMY, EXCISE TUMOR, COMBINED Left 4/10/2015    Procedure: COMBINED OPTICAL TRACKING SYSTEM CRANIOTOMY, EXCISE TUMOR;  Surgeon: Mirlande Colmenares MD;  Location: [facility]     REPAIR GAMEKEEPER'S THUMB Right 12/2/2016    Procedure: REPAIR LIGAMENT ULNAR COLLATERAL THUMB (GAMEKEEPER'S);  Surgeon: Evin Zamorano MD;  Location[facility]           Family History:     Family History   Problem Relation Age of Onset     DIABETES Brother      DIABETES Father      Alzheimer Disease Father      Unknown/Adopted Mother      DIABETES Paternal Grandfather      CANCER No family hx of      no skin cancer     Skin Cancer No family hx of      no famiy hx of skin cancer     Glaucoma No family hx of      Macular Degeneration No family hx of            Social History:     Social History   Substance Use Topics     Smoking status: Current Every Day Smoker     Packs/day: 0.20     Last attempt to quit: 10/28/2016     Smokeless tobacco: Former User      Comment: 4 cigarettes     Alcohol use No      Comment: Last etoh in 2007     Social History     Social History Narrative    Born in [facility].  [place] the [place] in [place].  Last traveled to visit family in 2008.                 Review of Systems:   CONSTITUTIONAL: negative fevers or chills    GASTROINTESTINAL: negative for nausea, vomiting, diarrhea or constipation   INTEGUMENT: negative for rash or pruritus  NEURO: Negative for headache         Current Medications:     Current Outpatient Prescriptions   Medication Sig Dispense Refill     acetaminophen (TYLENOL) 325 MG tablet Take 1-2 tablets (325-650 mg) by mouth every 6 hours as needed for mild pain 60 tablet 0     blood glucose (NO BRAND SPECIFIED) lancets standard Use to test blood sugar four times daily or as directed. 100 Box 5     blood glucose monitoring (GELACIO CONTOUR NEXT) test strip Use to test blood sugar 2 times daily or as directed.  Ok to substitute alternative if insurance prefers. 100 strip 11     blood glucose monitoring (GELACIO MICROLET) lancets Use to test blood sugar 2 times daily or as directed.  Ok to substitute alternative if insurance prefers. 100 each 11     blood glucose monitoring (NO BRAND SPECIFIED) meter device kit Use to test blood sugar four times daily or as directed. 1 kit 0     blood glucose monitoring (NO BRAND SPECIFIED) meter device kit Use to test blood sugar 4 times daily or as directed. 1 kit 0     blood glucose monitoring (NO BRAND SPECIFIED) test strip Use to test blood sugars four times daily or as directed 100 each 5     blood glucose monitoring (NO BRAND SPECIFIED) test strip 1 strip by In Vitro route 4 times daily (before meals and nightly) Use to test blood sugars 4 times daily or as directed 100 strip 11     GENVOYA [id number] mg tablet Take 1 tablet by mouth daily (with breakfast) 30 tablet 0     insulin glargine (LANTUS SOLOSTAR) 100 UNIT/ML injection Take 7 units each morning 15 mL 3     insulin pen needle (B-D U/F) 31G X 8 MM Use 1 pen needles daily or as directed. 100 each 0     insulin pen needle (BD SCOTT U/F) 32G X 4 MM Use 1 daily as directed. 100 each 11     omeprazole (PRILOSEC) 20 MG CR capsule TAKE ONE CAPSULE BY MOUTH EVERY DAY 90 capsule 1            Immunization History:      Immunization History   Administered Date(s) Administered     HepB 03/10/2016, 06/16/2016     HepB-Adult 08/31/2017     Influenza (IIV3) PF 10/17/2016     Influenza Vaccine IM 3yrs+ 4 Valent IIV4 12/22/2015, 12/14/2017     Mantoux Tuberculin Skin Test 04/12/2015     Meningococcal (Menactra ) 08/31/2015, 03/10/2016     Pneumo Conj 13-V (2010&after) 03/10/2016     Pneumococcal 23 valent 06/16/2016     TDAP Vaccine (Boostrix) 10/17/2016            Allergies:     Allergies   Allergen Reactions     Metformin      Abdominal pain     Dulaglutide Rash            Physical Exam:   /81  Pulse 95  Temp 98.1  F (36.7  C) (Oral)  Wt 78.9 kg (174 lb)  SpO2 98%  BMI 29.87 kg/m2  Physical Examination:  GENERAL:  well-developed, well-nourished, seated in no acute distress.  HEENT:  Head is normocephalic, atraumatic   EYES:  Eyes have anicteric sclerae without conjunctival injection   SKIN:  No acute rashes. .   NEUROLOGIC:  Grossly nonfocal. Active x4 extremities          Laboratory Data:     CD4 Count  Absolute CD4   Date Value Ref Range Status   03/07/2018 256 (L) 441 - 2156 cells/uL Final     CD4 [place] T   Date Value Ref Range Status   03/07/2018 10 (L) 28 - 63 % Final     CD4:CD8 Ratio   Date Value Ref Range Status   03/07/2018 0.24 (L) 1.40 - 2.60 Final       HIV-1 RNA Quantitative:  HIV-1 RNA Quant Result   Date Value Ref Range Status   03/07/2018 65901 (A) HIVND^HIV-1 RNA Not Detected [Copies]/mL Final     Comment:     The DAMON AmpliPrep/DAMON TaqMan HIV-1 test is an FDA-approved in vitro   nucleic acid amplification test for the quantitation of HIV-1 RNA in human   plasma (EDTA plasma) using the DAMON AmpliPrep instrument for automated viral   nucleic acid extraction and the DAMON TaqMan Analyzer or [employer] TaqMan for   automated Real Time PCR amplification and detection of the viral nucleic acid   target.  Titer results are reported in copies/ml. This assay is intended for use in   conjunction with clinical  presentation and other laboratory markers of disease   prognosis and for use as an aid in assessing viral response to antiretroviral   treatment as measured by changes in plasma HIV-1 RNA levels. This test should   not be used as a donor screening test to confirm the presence of HIV-1   infection.         Metabolic Studies       Sodium   Date Value Ref Range Status   04/20/2018 140 133 - 144 mmol/L Final   04/18/2018 140 133 - 144 mmol/L Final     Potassium   Date Value Ref Range Status   04/20/2018 3.4 3.4 - 5.3 mmol/L Final   04/18/2018 3.5 3.4 - 5.3 mmol/L Final     Chloride   Date Value Ref Range Status   04/20/2018 105 94 - 109 mmol/L Final   04/18/2018 108 94 - 109 mmol/L Final     Carbon Dioxide   Date Value Ref Range Status   04/20/2018 26 20 - 32 mmol/L Final   04/18/2018 25 20 - 32 mmol/L Final     Anion Gap   Date Value Ref Range Status   04/20/2018 9 3 - 14 mmol/L Final   04/18/2018 7 3 - 14 mmol/L Final     Urea Nitrogen   Date Value Ref Range Status   04/20/2018 13 7 - 30 mg/dL Final   04/18/2018 9 7 - 30 mg/dL Final     Creatinine   Date Value Ref Range Status   04/20/2018 0.89 0.66 - 1.25 mg/dL Final   04/18/2018 0.72 0.66 - 1.25 mg/dL Final     GFR Estimate   Date Value Ref Range Status   04/20/2018 90 >60 mL/min/1.7m2 Final     Comment:     Non  GFR Calc   04/18/2018 >90 >60 mL/min/1.7m2 Final     Comment:     Non  GFR Calc     Glucose   Date Value Ref Range Status   04/20/2018 135 (H) 70 - 99 mg/dL Final   04/18/2018 117 (H) 70 - 99 mg/dL Final     Hemoglobin A1C   Date Value Ref Range Status   08/11/2017 6.0 4.3 - 6.0 % Final     Calcium   Date Value Ref Range Status   04/20/2018 8.9 8.5 - 10.1 mg/dL Final   04/18/2018 8.6 8.5 - 10.1 mg/dL Final     Phosphorus   Date Value Ref Range Status   04/18/2018 2.8 2.5 - 4.5 mg/dL Final   04/17/2018 2.9 2.5 - 4.5 mg/dL Final     Magnesium   Date Value Ref Range Status   04/18/2018 2.0 1.6 - 2.3 mg/dL Final   04/17/2018 1.9  1.6 - 2.3 mg/dL Final     Lactic Acid   Date Value Ref Range Status   04/16/2018 1.0 0.7 - 2.0 mmol/L Final   01/24/2018 1.1 0.4 - 2.0 mmol/L Final       Hepatic Studies    Bilirubin Total   Date Value Ref Range Status   04/16/2018 0.5 0.2 - 1.3 mg/dL Final   02/18/2018 0.3 0.2 - 1.3 mg/dL Final     Alkaline Phosphatase   Date Value Ref Range Status   04/16/2018 98 40 - 150 U/L Final   02/18/2018 125 40 - 150 U/L Final     Albumin   Date Value Ref Range Status   04/16/2018 4.0 3.4 - 5.0 g/dL Final   02/18/2018 3.5 3.4 - 5.0 g/dL Final     AST   Date Value Ref Range Status   04/16/2018 19 0 - 45 U/L Final   02/18/2018 26 0 - 45 U/L Final     ALT   Date Value Ref Range Status   04/16/2018 20 0 - 70 U/L Final   02/18/2018 30 0 - 70 U/L Final       Hematology Studies      WBC   Date Value Ref Range Status   04/20/2018 7.8 4.0 - 11.0 10e9/L Final   04/18/2018 7.9 4.0 - 11.0 10e9/L Final     Absolute Neutrophil   Date Value Ref Range Status   04/20/2018 4.3 1.6 - 8.3 10e9/L Final   04/16/2018 6.9 1.6 - 8.3 10e9/L Final     Absolute Lymphocytes   Date Value Ref Range Status   04/20/2018 2.6 0.8 - 5.3 10e9/L Final   04/16/2018 3.8 0.8 - 5.3 10e9/L Final     Absolute Monocytes   Date Value Ref Range Status   04/20/2018 0.5 0.0 - 1.3 10e9/L Final   04/16/2018 0.8 0.0 - 1.3 10e9/L Final     Absolute Eosinophils   Date Value Ref Range Status   04/20/2018 0.3 0.0 - 0.7 10e9/L Final   04/16/2018 0.4 0.0 - 0.7 10e9/L Final     Hemoglobin   Date Value Ref Range Status   04/20/2018 14.1 13.3 - 17.7 g/dL Final   04/18/2018 12.4 (L) 13.3 - 17.7 g/dL Final     Hematocrit   Date Value Ref Range Status   04/20/2018 41.9 40.0 - 53.0 % Final   04/18/2018 37.9 (L) 40.0 - 53.0 % Final     Platelet Count   Date Value Ref Range Status   04/20/2018 269 150 - 450 10e9/L Final   04/18/2018 248 150 - 450 10e9/L Final       Hepatitis B Testing     Recent Labs   Lab Test  04/13/15   0816   HBCAB  Nonreactive   HEPBANG  Nonreactive   HBCM   Nonreactive   A nonreactive result suggests lack of recent exposure to the virus in the   preceding 6 months.     HBEABY  Negative  Reference range: Negative  (Note)  Performed by [facility],  [address] [facility] [place] [address] [phone]  [email or web address], Nayan Corley MD, Lab. Director     HBEAGN  Negative  Reference range: Negative  (Note)  Performed by [facility],  [address] [facility] [id number] [phone]  [email or web address], Nayan Corley MD, Lab. Director         Hepatitis C Testing     Hepatitis C Antibody   Date Value Ref Range Status   03/02/2017  NR Final    Nonreactive   Assay performance characteristics have not been established for newborns,   infants, and children     04/13/2015  NR Final    Nonreactive   Assay performance characteristics have not been established for newborns,   infants, and children       Imaging:  No results found for this or any previous visit (from the past 744 hour(s)).      Sharon Rosado MD

## 2018-04-26 NOTE — PROGRESS NOTES
[facility] - HIV Progress Note  Dr. Sharon Rosado, [facility], [facility], [address] [place] [address], Sixth Floor, [address]  [place] [address]  Patient:  Andrew Lockwood, Date of birth 11/27/1967, Medical record number [id number]  Date of Visit: Apr 26, 2018         Assessment and Recommendations:     HIV  Mr. Lockwood had a detectable viral load at his last check.  He reports that this was right after he was in penitentiary and there he was not getting his medications. I will check his viral load again today and we will order a pheno/jolynn to be done in the case that it was detectable.     Preventative health care  Cardiovascular Juan Jose -               Lipids - CHOL 180, LDL 72 (8/18/2016)              Blood Pressure -  /81   Cancer Screening              Colon - Up to date. [facility]   Anal - Previous anal pap with insufficient cells.  He has been referred to colorectal, but he has not yet made an appointment  Immunizations              Hepatitis A - immune, serology 4/13/15              Hepatitis B - 6/16/2016, 3/10/2016, 8/31/2017              Influenza - Up To date              Pneumovax/Prevnar - Up to date               Tdap - 6/8/14 per [facility]              Menactra - Up to date.    Renal Health -  UA without proteinuria and creatinine normal in 12/2016.   Sexually Transmitted Infection Risk and Screening              Gonorrhea and Chlamydia - GC/chlamydia negative on 3/2017, declined testing today, he has a new boyfriend but says they are not sexually active.               Syphilis - negative 3/2017,  declined testing today    I spent more than 15 minutes today in face to face time managing the care of Andrew Lockwood.  Over 50% of my time on the unit was spent counseling the patient and/or coordinating care regarding services listed in this note.    Sharon Rosado MD  Division of Infectious Diseases and  International Medicine  [phone]         History of Infectious Disease Illness:   Mr. Lockwood is a 50 year old gentleman who I follow for HIV.  He came by the clinic today and based on his recent detectable HIV viral load, I wanted to discuss how his antiretroviral treatment is going.  Mr. Lockwood has had periods of poor adherence in the past that have led to recurrence of his toxoplasmosis.  Discussing his ART history, he says today that he is very adherent.  He only occasionally misses doses.  He does, as he frequently does, wonder whether he is cured of his HIV. He thinks that the viral load elevation was probably around the time that he was incarcerated.  He was unable to get his medications during that time.         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 They did not live together.  Historical use of ARVs: Triumeq, Genvoya  Historical Resistance Mutations: Susceptible        Past Medical and Surgical History:     Past Medical History:   Diagnosis Date     AIDS (H)      Allergic rhinitis due to other allergen     DNS     Chronic abdominal pain      CNS toxoplasmosis (H)      Diabetes type 2, controlled (H)      GERD (gastroesophageal reflux disease)      HIV (human immunodeficiency virus infection) (H)      Periungual wart      Sleep apnea     doesn't use CPAP       Past Surgical History:   Procedure Laterality Date     C NONSPECIFIC PROCEDURE      right forearm fracture     COLONOSCOPY Left 1/22/2016    Procedure: COMBINED COLONOSCOPY, SINGLE OR MULTIPLE BIOPSY/POLYPECTOMY BY BIOPSY;  Surgeon: Clark Saini MD;  Location:   GI     HC EXPLORE UNDESC TESTIS,INGUIN/SCROTAL       LAPAROSCOPIC APPENDECTOMY N/A 1/31/2018    Procedure: LAPAROSCOPIC APPENDECTOMY;  LAPAROSCOPIC APPENDECTOMY;  Surgeon: Dawn Holt MD;  Location: [facility] OR     LAPAROSCOPY DIAGNOSTIC (GENERAL) N/A 7/26/2016    Procedure: LAPAROSCOPY DIAGNOSTIC (GENERAL);  Surgeon: Susannah Arriaga MD;  Location: [facility] OR     LAPAROSCOPY DIAGNOSTIC (GENERAL) N/A 4/16/2018    Procedure: LAPAROSCOPY DIAGNOSTIC (GENERAL);  Diagnostic laparoscopy and lysis of adhesions;  Surgeon: Prince Dowling MD;  Location: [facility] OR     OPTICAL TRACKING SYSTEM CRANIOTOMY, EXCISE TUMOR, COMBINED Left 4/10/2015    Procedure: COMBINED OPTICAL TRACKING SYSTEM CRANIOTOMY, EXCISE TUMOR;  Surgeon: Mirlande Colmenares MD;  Location: [facility] OR     REPAIR GAMEKEEPER'S THUMB Right 12/2/2016    Procedure: REPAIR LIGAMENT ULNAR COLLATERAL THUMB (GAMEKEEPER'S);  Surgeon: Evin Zamorano MD;  Location: [facility] OR           Family History:     Family History   Problem Relation Age of Onset     DIABETES Brother      DIABETES Father      Alzheimer Disease Father      Unknown/Adopted Mother      DIABETES Paternal Grandfather      CANCER No family hx of      no skin cancer     Skin Cancer No family hx of      no famiy hx of skin cancer     Glaucoma No family hx of      Macular Degeneration No family hx of            Social History:     Social History   Substance Use Topics     Smoking status: Current Every Day Smoker     Packs/day: 0.20     Last attempt to quit: 10/28/2016     Smokeless tobacco: Former User      Comment: 4 cigarettes     Alcohol use No      Comment: Last etoh in 2007     Social History     Social History Narrative    Born in [facility].  [place]ame to the [place] in 1993.  Last traveled to visit family in 2008.                 Review of Systems:   CONSTITUTIONAL: negative fevers or chills   GASTROINTESTINAL: negative for nausea, vomiting, diarrhea or constipation   INTEGUMENT: negative for  rash or pruritus  NEURO: Negative for headache         Current Medications:     Current Outpatient Prescriptions   Medication Sig Dispense Refill     acetaminophen (TYLENOL) 325 MG tablet Take 1-2 tablets (325-650 mg) by mouth every 6 hours as needed for mild pain 60 tablet 0     blood glucose (NO BRAND SPECIFIED) lancets standard Use to test blood sugar four times daily or as directed. 100 Box 5     blood glucose monitoring (GELACIO CONTOUR NEXT) test strip Use to test blood sugar 2 times daily or as directed.  Ok to substitute alternative if insurance prefers. 100 strip 11     blood glucose monitoring (GELACIO MICROLET) lancets Use to test blood sugar 2 times daily or as directed.  Ok to substitute alternative if insurance prefers. 100 each 11     blood glucose monitoring (NO BRAND SPECIFIED) meter device kit Use to test blood sugar four times daily or as directed. 1 kit 0     blood glucose monitoring (NO BRAND SPECIFIED) meter device kit Use to test blood sugar 4 times daily or as directed. 1 kit 0     blood glucose monitoring (NO BRAND SPECIFIED) test strip Use to test blood sugars four times daily or as directed 100 each 5     blood glucose monitoring (NO BRAND SPECIFIED) test strip 1 strip by In Vitro route 4 times daily (before meals and nightly) Use to test blood sugars 4 times daily or as directed 100 strip 11     GENVOYA [phone] mg tablet Take 1 tablet by mouth daily (with breakfast) 30 tablet 0     insulin glargine (LANTUS SOLOSTAR) 100 UNIT/ML injection Take 7 units each morning 15 mL 3     insulin pen needle (B-D U/F) 31G X 8 MM Use 1 pen needles daily or as directed. 100 each 0     insulin pen needle (BD SCOTT U/F) 32G X 4 MM Use 1 daily as directed. 100 each 11     omeprazole (PRILOSEC) 20 MG CR capsule TAKE ONE CAPSULE BY MOUTH EVERY DAY 90 capsule 1            Immunization History:     Immunization History   Administered Date(s) Administered     HepB 03/10/2016, 06/16/2016     HepB-Adult  08/31/2017     Influenza (IIV3) PF 10/17/2016     Influenza Vaccine IM 3yrs+ 4 Valent IIV4 12/22/2015, 12/14/2017     Mantoux Tuberculin Skin Test 04/12/2015     Meningococcal (Menactra ) 08/31/2015, 03/10/2016     Pneumo Conj 13-V (2010&after) 03/10/2016     Pneumococcal 23 valent 06/16/2016     TDAP Vaccine (Boostrix) 10/17/2016            Allergies:     Allergies   Allergen Reactions     Metformin      Abdominal pain     Dulaglutide Rash            Physical Exam:   /81  Pulse 95  Temp 98.1  F (36.7  C) (Oral)  Wt 78.9 kg (174 lb)  SpO2 98%  BMI 29.87 kg/m2  Physical Examination:  GENERAL:  well-developed, well-nourished, seated in no acute distress.  HEENT:  Head is normocephalic, atraumatic   EYES:  Eyes have anicteric sclerae without conjunctival injection   SKIN:  No acute rashes. .   NEUROLOGIC:  Grossly nonfocal. Active x4 extremities          Laboratory Data:     CD4 Count  Absolute CD4   Date Value Ref Range Status   03/07/2018 256 (L) 441 - 2156 cells/uL Final     CD4 Shirley T   Date Value Ref Range Status   03/07/2018 10 (L) 28 - 63 % Final     CD4:CD8 Ratio   Date Value Ref Range Status   03/07/2018 0.24 (L) 1.40 - 2.60 Final       HIV-1 RNA Quantitative:  HIV-1 RNA Quant Result   Date Value Ref Range Status   03/07/2018 03103 (A) HIVND^HIV-1 RNA Not Detected [Copies]/mL Final     Comment:     The DAMON AmpliPrep/DAMON TaqMan HIV-1 test is an FDA-approved in vitro   nucleic acid amplification test for the quantitation of HIV-1 RNA in human   plasma (EDTA plasma) using the DAMON AmpliPrep instrument for automated viral   nucleic acid extraction and the DAMON TaqMan Analyzer or DAMON TaqMan for   automated Real Time PCR amplification and detection of the viral nucleic acid   target.  Titer results are reported in copies/ml. This assay is intended for use in   conjunction with clinical presentation and other laboratory markers of disease   prognosis and for use as an aid in assessing viral  response to antiretroviral   treatment as measured by changes in plasma HIV-1 RNA levels. This test should   not be used as a donor screening test to confirm the presence of HIV-1   infection.         Metabolic Studies       Sodium   Date Value Ref Range Status   04/20/2018 140 133 - 144 mmol/L Final   04/18/2018 140 133 - 144 mmol/L Final     Potassium   Date Value Ref Range Status   04/20/2018 3.4 3.4 - 5.3 mmol/L Final   04/18/2018 3.5 3.4 - 5.3 mmol/L Final     Chloride   Date Value Ref Range Status   04/20/2018 105 94 - 109 mmol/L Final   04/18/2018 108 94 - 109 mmol/L Final     Carbon Dioxide   Date Value Ref Range Status   04/20/2018 26 20 - 32 mmol/L Final   04/18/2018 25 20 - 32 mmol/L Final     Anion Gap   Date Value Ref Range Status   04/20/2018 9 3 - 14 mmol/L Final   04/18/2018 7 3 - 14 mmol/L Final     Urea Nitrogen   Date Value Ref Range Status   04/20/2018 13 7 - 30 mg/dL Final   04/18/2018 9 7 - 30 mg/dL Final     Creatinine   Date Value Ref Range Status   04/20/2018 0.89 0.66 - 1.25 mg/dL Final   04/18/2018 0.72 0.66 - 1.25 mg/dL Final     GFR Estimate   Date Value Ref Range Status   04/20/2018 90 >60 mL/min/1.7m2 Final     Comment:     Non  GFR Calc   04/18/2018 >90 >60 mL/min/1.7m2 Final     Comment:     Non  GFR Calc     Glucose   Date Value Ref Range Status   04/20/2018 135 (H) 70 - 99 mg/dL Final   04/18/2018 117 (H) 70 - 99 mg/dL Final     Hemoglobin A1C   Date Value Ref Range Status   08/11/2017 6.0 4.3 - 6.0 % Final     Calcium   Date Value Ref Range Status   04/20/2018 8.9 8.5 - 10.1 mg/dL Final   04/18/2018 8.6 8.5 - 10.1 mg/dL Final     Phosphorus   Date Value Ref Range Status   04/18/2018 2.8 2.5 - 4.5 mg/dL Final   04/17/2018 2.9 2.5 - 4.5 mg/dL Final     Magnesium   Date Value Ref Range Status   04/18/2018 2.0 1.6 - 2.3 mg/dL Final   04/17/2018 1.9 1.6 - 2.3 mg/dL Final     Lactic Acid   Date Value Ref Range Status   04/16/2018 1.0 0.7 - 2.0 mmol/L  Final   01/24/2018 1.1 0.4 - 2.0 mmol/L Final       Hepatic Studies    Bilirubin Total   Date Value Ref Range Status   04/16/2018 0.5 0.2 - 1.3 mg/dL Final   02/18/2018 0.3 0.2 - 1.3 mg/dL Final     Alkaline Phosphatase   Date Value Ref Range Status   04/16/2018 98 40 - 150 U/L Final   02/18/2018 125 40 - 150 U/L Final     Albumin   Date Value Ref Range Status   04/16/2018 4.0 3.4 - 5.0 g/dL Final   02/18/2018 3.5 3.4 - 5.0 g/dL Final     AST   Date Value Ref Range Status   04/16/2018 19 0 - 45 U/L Final   02/18/2018 26 0 - 45 U/L Final     ALT   Date Value Ref Range Status   04/16/2018 20 0 - 70 U/L Final   02/18/2018 30 0 - 70 U/L Final       Hematology Studies      WBC   Date Value Ref Range Status   04/20/2018 7.8 4.0 - 11.0 10e9/L Final   04/18/2018 7.9 4.0 - 11.0 10e9/L Final     Absolute Neutrophil   Date Value Ref Range Status   04/20/2018 4.3 1.6 - 8.3 10e9/L Final   04/16/2018 6.9 1.6 - 8.3 10e9/L Final     Absolute Lymphocytes   Date Value Ref Range Status   04/20/2018 2.6 0.8 - 5.3 10e9/L Final   04/16/2018 3.8 0.8 - 5.3 10e9/L Final     Absolute Monocytes   Date Value Ref Range Status   04/20/2018 0.5 0.0 - 1.3 10e9/L Final   04/16/2018 0.8 0.0 - 1.3 10e9/L Final     Absolute Eosinophils   Date Value Ref Range Status   04/20/2018 0.3 0.0 - 0.7 10e9/L Final   04/16/2018 0.4 0.0 - 0.7 10e9/L Final     Hemoglobin   Date Value Ref Range Status   04/20/2018 14.1 13.3 - 17.7 g/dL Final   04/18/2018 12.4 (L) 13.3 - 17.7 g/dL Final     Hematocrit   Date Value Ref Range Status   04/20/2018 41.9 40.0 - 53.0 % Final   04/18/2018 37.9 (L) 40.0 - 53.0 % Final     Platelet Count   Date Value Ref Range Status   04/20/2018 269 150 - 450 10e9/L Final   04/18/2018 248 150 - 450 10e9/L Final       Hepatitis B Testing     Recent Labs   Lab Test  04/13/15   0816   HBCAB  Nonreactive   HEPBANG  Nonreactive   HBCM  Nonreactive   A nonreactive result suggests lack of recent exposure to the virus in the   preceding 6 months.      HBEABY  Negative  Reference range: Negative  (Note)  Performed by [facility],  [address] [place] [address] [phone]  [email or web address], Nayan Corley MD, Lab. Director     HBEAGN  Negative  Reference range: Negative  (Note)  Performed by [facility],  [address] [place] [address] [phone]  [email or web address], Nayan Corley MD, Lab. Director         Hepatitis C Testing     Hepatitis C Antibody   Date Value Ref Range Status   03/02/2017  NR Final    Nonreactive   Assay performance characteristics have not been established for newborns,   infants, and children     04/13/2015  NR Final    Nonreactive   Assay performance characteristics have not been established for newborns,   infants, and children       Imaging:  No results found for this or any previous visit (from the past 744 hour(s)).

## 2018-04-26 NOTE — PROGRESS NOTES
"DIABETES EDUCATION NOTE:    Andrew Lockwood presents today for education related to Type 2 diabetes.    Patient is being treated with:  Trulicity  He is accompanied by self    PATIENT CONCERNS RELATED TO DIABETES SELF MANAGEMENT:   Questions whether his pancreas will stop making insulin if he injects it    ASSESSMENT:      Current Diabetes Management per Patient:  Taking diabetes medications?   yes:     Diabetes Medication(s)     Insulin Sig    insulin glargine (LANTUS SOLOSTAR) 100 UNIT/ML injection Take 7 units each morning    BASAGLAR 100 UNIT/ML injection Inject 7 units once daily subcutaneously          Monitoring  Patient glucose self monitoring as follows: rarely.   BG meter: Contour Next One meter  BG results: not available     BG values are: unable to assess  Patient's most recent   Lab Results   Component Value Date    A1C 6.0 08/11/2017    is meeting goal of <7.0    Todays Blood Glucose result was 211 on Accu-Chek Guide meter.    Exercise: no regular exercise program    Nutrition:   Patient currently eats at homeless shelters and food shelf    Hypoglycemia:   Patient is at risk of hypoglycemia? Yes                          EDUCATION and INSTRUCTION PROVIDED AT THIS VISIT:      Topics that were covered in today's visit:    Basic pathophysiology of T2DM, relationship between carbohydrate intake, release of glucose from the liver, exercise and weight management on glucose control.    Self blood glucose monitoring (SBGM) using the Contour Next One meter.    Target blood glucose for pre-meal and 2 hour post-prandial glucose      Explained the action and timing of Glargine insulin.  Demonstrated applying pen needle, dialing up the 2 unit \"airshot\", dialing up dose to be given, injecting insulin and instructed to hold in the skin for 10 seconds after pushing in dose.   Discussed safe and legal disposal of needles.   Discussed the storage and disposal of insulin pens, including \"good to\" date.   Discussed the " recognition and treatment of hypoglycemia, including carrying meter and fast-acting form of carbohydrate at all times.   Discussed plan for follow up.   Discussed goals for BG's and probable need for adjustment of insulin.      Patient-stated goal written and given to Andrew Lockwood.  Verbalized and demonstrated understanding of instructions.     PLAN:  Lantus 7 units every morning  Store insulin in cooler   glucose tablets at the pharmacy and carry them with you  Follow up as scheduled  AVS printed and given to patient    FOLLOW-UP:      Time spent with patient at today's visit was 60 minutes.      Any diabetes medication dose changes were made via the [facility] Protocol and Collaborative Practice Agreement with [facility].  A copy of this encounter was provided to patient's referring provider.

## 2018-04-26 NOTE — MR AVS SNAPSHOT
After Visit Summary   4/26/2018    Andrew Lockwood    MRN: [id number]           Patient Information     Date Of Birth          11/27/1967        Visit Information        Provider Department      4/26/2018 10:00 AM Joanne Barrow RN [facility]        Today's Diagnoses     Type 2 diabetes mellitus with hyperglycemia, without long-term current use of insulin (H)    -  1       Follow-ups after your visit        Your next 10 appointments already scheduled     May 01, 2018  3:40 PM CDT   (Arrive by 3:25 PM)   Return Visit with Dawn Haq MD   [facility] ([facility])    [address]  [address] [facility] [address] [phone]              Who to contact     Please call your clinic at [phone] to:    Ask questions about your health    Make or cancel appointments    Discuss your medicines    Learn about your test results    Speak to your doctor            Additional Information About Your Visit        MyChart Information     [employer] gives you secure access to your electronic health record. If you see a primary care provider, you can also send messages to your care team and make appointments. If you have questions, please call your primary care clinic.  If you do not have a primary care provider, please call [phone] and they will assist you.      [employer] is an electronic gateway that provides easy, online access to your medical records. With [employer], you can request a clinic appointment, read your test results, renew a prescription or communicate with your care team.     To access your existing account, please contact your [facility] or call [phone] for assistance.        Care EveryWhere ID     This is your Care EveryWhere ID. This could be used by other organizations to access your [place] medical records  [id number]         Blood Pressure from Last 3 Encounters:   04/26/18 113/81    04/25/18 132/90   04/20/18 126/88    Weight from Last 3 Encounters:   04/26/18 78.9 kg (174 lb)   04/20/18 78 kg (172 lb)   04/18/18 78.2 kg (172 lb 8 oz)              We Performed the Following     DIABETES EDUCATION - Individual  []          Today's Medication Changes          These changes are accurate as of 4/26/18  3:05 PM.  If you have any questions, ask your nurse or doctor.               Start taking these medicines.        Dose/Directions    blood glucose monitoring lancets   Used for:  Type 2 diabetes mellitus with hyperglycemia, without long-term current use of insulin (H)   Started by:  Joanne Barrow RN        Use to test blood sugar 2 times daily or as directed.  Ok to substitute alternative if insurance prefers.   Quantity:  100 each   Refills:  11       * insulin glargine 100 UNIT/ML injection   Commonly known as:  LANTUS SOLOSTAR   Used for:  Type 2 diabetes mellitus with hyperglycemia, without long-term current use of insulin (H)   Started by:  Joanne Barrow RN        Take 7 units each morning   Quantity:  15 mL   Refills:  3       * BASAGLAR 100 UNIT/ML injection   Used for:  Type 2 diabetes mellitus (H)   Started by:  Ashley Connelly PA-C        Inject 7 units once daily subcutaneously   Quantity:  15 mL   Refills:  3       * Notice:  This list has 2 medication(s) that are the same as other medications prescribed for you. Read the directions carefully, and ask your doctor or other care provider to review them with you.      These medicines have changed or have updated prescriptions.        Dose/Directions    * blood glucose monitoring test strip   Commonly known as:  no brand specified   This may have changed:  Another medication with the same name was added. Make sure you understand how and when to take each.   Used for:  Type 2 diabetes mellitus without complication, without long-term current use of insulin (H)   Changed by:  Joanne Barrow RN        Use to test blood  sugars four times daily or as directed   Quantity:  100 each   Refills:  5       * blood glucose monitoring test strip   Commonly known as:  no brand specified   This may have changed:  Another medication with the same name was added. Make sure you understand how and when to take each.   Used for:  Type 2 diabetes mellitus with complication, without long-term current use of insulin (H)   Changed by:  Joanne Barrow RN        Dose:  1 strip   1 strip by In Vitro route 4 times daily (before meals and nightly) Use to test blood sugars 4 times daily or as directed   Quantity:  100 strip   Refills:  11       * blood glucose monitoring test strip   Commonly known as:  GELACIO CONTOUR NEXT   This may have changed:  You were already taking a medication with the same name, and this prescription was added. Make sure you understand how and when to take each.   Used for:  Type 2 diabetes mellitus with hyperglycemia, without long-term current use of insulin (H)   Changed by:  Joanne Barrow RN        Use to test blood sugar 2 times daily or as directed.  Ok to substitute alternative if insurance prefers.   Quantity:  100 strip   Refills:  11       * insulin pen needle 31G X 8 MM   Commonly known as:  B-D U/F   This may have changed:  Another medication with the same name was added. Make sure you understand how and when to take each.   Used for:  Type 2 diabetes mellitus without complication, without long-term current use of insulin (H)   Changed by:  Joanne Barrow RN        Use 1 pen needles daily or as directed.   Quantity:  100 each   Refills:  0       * insulin pen needle 32G X 4 MM   Commonly known as:  BD SCOTT U/F   This may have changed:  You were already taking a medication with the same name, and this prescription was added. Make sure you understand how and when to take each.   Used for:  Type 2 diabetes mellitus with hyperglycemia, without long-term current use of insulin (H)   Changed by:  Joanne Barrow RN         Use 1 daily as directed.   Quantity:  100 each   Refills:  11       * Notice:  This list has 5 medication(s) that are the same as other medications prescribed for you. Read the directions carefully, and ask your doctor or other care provider to review them with you.         Where to get your medicines      These medications were sent to [place], [place] - [address]  [address], [facility] [address]    Hours:  TRANSPLANT PHONE NUMBER [phone] Phone:  [phone]     BASAGLAR 100 UNIT/ML injection    blood glucose monitoring lancets    blood glucose monitoring test strip    insulin glargine 100 UNIT/ML injection    insulin pen needle 32G X 4 MM                Primary Care Provider Office Phone # Fax #    Rubio Cronin [phone]       [address]  [facility] [address]        Equal Access to Services     RA JORDAN : Hadii aad ku hadasho Soomaali, waaxda luqadaha, qaybta kaalmada adeegyada, waxay idiin hayaan adeecho malcolmarariky moreno . So [facility] [phone].    ATENCIÓN: Si habla español, tiene a kohli disposición servicios gratuitos de asistencia lingüística. Gurdeep al [phone].    We comply with applicable federal civil rights laws and [place] laws. We do not discriminate on the basis of race, color, national origin, age, disability, sex, sexual orientation, or gender identity.            Thank you!     Thank you for choosing [facility]  for your care. Our goal is always to provide you with excellent care. Hearing back from our patients is one way we can continue to improve our services. Please take a few minutes to complete the written survey that you may receive in the mail after your visit with us. Thank you!             Your Updated Medication List - Protect others around you: Learn how to safely use, store and throw away your medicines at www.disposemymeds.org.          This list  is accurate as of 4/26/18  3:05 PM.  Always use your most recent med list.                   Brand Name Dispense Instructions for use Diagnosis    acetaminophen 325 MG tablet    TYLENOL    60 tablet    Take 1-2 tablets (325-650 mg) by mouth every 6 hours as needed for mild pain    Abdominal abscess (H)       blood glucose lancets standard    no brand specified    100 Box    Use to test blood sugar four times daily or as directed.    Type 2 diabetes mellitus without complication, without long-term current use of insulin (H)       blood glucose monitoring lancets     100 each    Use to test blood sugar 2 times daily or as directed.  Ok to substitute alternative if insurance prefers.    Type 2 diabetes mellitus with hyperglycemia, without long-term current use of insulin (H)       * blood glucose monitoring meter device kit    no brand specified    1 kit    Use to test blood sugar four times daily or as directed.    Type 2 diabetes mellitus without complication, without long-term current use of insulin (H)       * blood glucose monitoring meter device kit    no brand specified    1 kit    Use to test blood sugar 4 times daily or as directed.    Type 2 diabetes mellitus with complication, without long-term current use of insulin (H)       * blood glucose monitoring test strip    no brand specified    100 each    Use to test blood sugars four times daily or as directed    Type 2 diabetes mellitus without complication, without long-term current use of insulin (H)       * blood glucose monitoring test strip    no brand specified    100 strip    1 strip by In Vitro route 4 times daily (before meals and nightly) Use to test blood sugars 4 times daily or as directed    Type 2 diabetes mellitus with complication, without long-term current use of insulin (H)       * blood glucose monitoring test strip    GELACIO CONTOUR NEXT    100 strip    Use to test blood sugar 2 times daily or as directed.  Ok to substitute alternative if  insurance prefers.    Type 2 diabetes mellitus with hyperglycemia, without long-term current use of insulin (H)       Ziphcem-Gmeut-Joenrcsg-TenofAF [phone] MG Tabs per tablet    GENVOYA    30 tablet    Take 1 tablet by mouth daily (with breakfast)    AIDS (acquired immune deficiency syndrome) (H)       * insulin glargine 100 UNIT/ML injection    LANTUS SOLOSTAR    15 mL    Take 7 units each morning    Type 2 diabetes mellitus with hyperglycemia, without long-term current use of insulin (H)       * BASAGLAR 100 UNIT/ML injection     15 mL    Inject 7 units once daily subcutaneously    Type 2 diabetes mellitus (H)       * insulin pen needle 31G X 8 MM    B-D U/F    100 each    Use 1 pen needles daily or as directed.    Type 2 diabetes mellitus without complication, without long-term current use of insulin (H)       * insulin pen needle 32G X 4 MM    BD SCOTT U/F    100 each    Use 1 daily as directed.    Type 2 diabetes mellitus with hyperglycemia, without long-term current use of insulin (H)       omeprazole 20 MG CR capsule    priLOSEC    90 capsule    TAKE ONE CAPSULE BY MOUTH EVERY DAY    Epigastric pain       * Notice:  This list has 9 medication(s) that are the same as other medications prescribed for you. Read the directions carefully, and ask your doctor or other care provider to review them with you.

## 2018-04-26 NOTE — NURSING NOTE
Chief Complaint   Patient presents with     RECHECK     B20   Pt roomed, vitals, meds, and allergies reviewed with pt. Pt ready for provider.  Silas Stahl, CMA

## 2018-04-26 NOTE — MR AVS SNAPSHOT
After Visit Summary   4/26/2018    Andrew Lockwood    MRN: [id number]           Patient Information     Date Of Birth          11/27/1967        Visit Information        Provider Department      4/26/2018 11:30 AM Sharon Rosado MD [facility]        Today's Diagnoses     HIV disease (H)    -  1       Follow-ups after your visit        Your next 10 appointments already scheduled     May 03, 2018  9:00 AM CDT   (Arrive by 8:45 AM)   HOLTER MONITOR VISIT with  Cvc Monitor [facility] ([facility])    [address]  [address]  [place] [facility] [address]   [phone]            May 24, 2018  7:00 AM CDT   (Arrive by 6:45 AM)   Return Visit with VIKASH Eden [facility] ([facility])    [address]  4th Floor  [place] [address]   [phone]            May 30, 2018  1:30 PM CDT   RETURN RETINA with Daniella Shah MD   [facility] ([facility])    [address]  [address]  [place] [facility] [facility] [address]   [phone]            Jun 08, 2018 10:30 AM CDT   (Arrive by 10:15 AM)   RETURN HAND with Evin Zamorano MD   [facility] ([facility])    [address]  4th Floor  [place] [facility] [facility] [address] [phone]            Jun 13, 2018  8:00 AM CDT   (Arrive by 7:45 AM)   [facility]   [facility] ([facility])    [address]  4th Floor  [place] [facility] [address] [phone]           Do not wear perfume.              Future tests that were ordered for you today     Open Future Orders        Priority Expected Expires Ordered    Holter Monitor 48 hour - Adult Routine  6/15/2018 5/1/2018            Who to contact      If you have questions or need follow up information about today's clinic visit or your schedule please contact [facility] directly at [phone].  Normal or non-critical lab and imaging results will be communicated to you by [employer], letter or phone within 4 business days after the clinic has received the results. If you do not hear from us within 7 days, please contact the clinic through [employer] or phone. If you have a critical or abnormal lab result, we will notify you by phone as soon as possible.  Submit refill requests through [employer] or call your pharmacy and they will forward the refill request to us. Please allow 3 business days for your refill to be completed.          Additional Information About Your Visit        [employer] Information     [employer] gives you secure access to your electronic health record. If you see a primary care provider, you can also send messages to your care team and make appointments. If you have questions, please call your primary care clinic.  If you do not have a primary care provider, please call [phone] and they will assist you.        Care EveryWhere ID     This is your Care EveryWhere ID. This could be used by other organizations to access your [facility] medical records  [id number]        Your Vitals Were     Pulse Temperature Pulse Oximetry BMI (Body Mass Index)          95 98.1  F (36.7  C) (Oral) 98% 29.87 kg/m2         Blood Pressure from Last 3 Encounters:   05/01/18 119/83   04/28/18 120/79   04/26/18 113/81    Weight from Last 3 Encounters:   05/01/18 78.2 kg (172 lb 4.8 oz)   04/28/18 78.8 kg (173 lb 12.8 oz)   04/26/18 78.9 kg (174 lb)                 Today's Medication Changes          These changes are accurate as of 4/26/18 11:59 PM.  If you have any questions, ask your nurse or doctor.               Start taking these medicines.        Dose/Directions    blood glucose monitoring lancets   Used for:  Type 2 diabetes  mellitus with hyperglycemia, without long-term current use of insulin (H)   Started by:  Joanne Barrow RN        Use to test blood sugar 2 times daily or as directed.  Ok to substitute alternative if insurance prefers.   Quantity:  100 each   Refills:  11       * insulin glargine 100 UNIT/ML injection   Commonly known as:  LANTUS SOLOSTAR   Used for:  Type 2 diabetes mellitus with hyperglycemia, without long-term current use of insulin (H)   Started by:  Joanne Barrow RN        Take 7 units each morning   Quantity:  15 mL   Refills:  3       * BASAGLAR 100 UNIT/ML injection   Used for:  Type 2 diabetes mellitus (H)   Started by:  Ashley Connelly PA-C        Inject 7 units once daily subcutaneously   Quantity:  15 mL   Refills:  3       * Notice:  This list has 2 medication(s) that are the same as other medications prescribed for you. Read the directions carefully, and ask your doctor or other care provider to review them with you.      These medicines have changed or have updated prescriptions.        Dose/Directions    * blood glucose monitoring test strip   Commonly known as:  no brand specified   This may have changed:  Another medication with the same name was added. Make sure you understand how and when to take each.   Used for:  Type 2 diabetes mellitus without complication, without long-term current use of insulin (H)   Changed by:  Joanne Barrow RN        Use to test blood sugars four times daily or as directed   Quantity:  100 each   Refills:  5       * blood glucose monitoring test strip   Commonly known as:  no brand specified   This may have changed:  Another medication with the same name was added. Make sure you understand how and when to take each.   Used for:  Type 2 diabetes mellitus with complication, without long-term current use of insulin (H)   Changed by:  Joanne Barrow RN        Dose:  1 strip   1 strip by In Vitro route 4 times daily (before meals and nightly) Use to test  blood sugars 4 times daily or as directed   Quantity:  100 strip   Refills:  11       * blood glucose monitoring test strip   Commonly known as:  GELACIO CONTOUR NEXT   This may have changed:  You were already taking a medication with the same name, and this prescription was added. Make sure you understand how and when to take each.   Used for:  Type 2 diabetes mellitus with hyperglycemia, without long-term current use of insulin (H)   Changed by:  Joanne Barrow RN        Use to test blood sugar 2 times daily or as directed.  Ok to substitute alternative if insurance prefers.   Quantity:  100 strip   Refills:  11       * insulin pen needle 31G X 8 MM   Commonly known as:  B-D U/F   This may have changed:  Another medication with the same name was added. Make sure you understand how and when to take each.   Used for:  Type 2 diabetes mellitus without complication, without long-term current use of insulin (H)   Changed by:  Joanne Barrow RN        Use 1 pen needles daily or as directed.   Quantity:  100 each   Refills:  0       * insulin pen needle 32G X 4 MM   Commonly known as:  BD SCOTT U/F   This may have changed:  You were already taking a medication with the same name, and this prescription was added. Make sure you understand how and when to take each.   Used for:  Type 2 diabetes mellitus with hyperglycemia, without long-term current use of insulin (H)   Changed by:  Joanne Barrow RN        Use 1 daily as directed.   Quantity:  100 each   Refills:  11       * Notice:  This list has 5 medication(s) that are the same as other medications prescribed for you. Read the directions carefully, and ask your doctor or other care provider to review them with you.         Where to get your medicines      These medications were sent to [place], [place] - [address] [place] [address] [address], [facility] [address]    Hours:  TRANSPLANT PHONE NUMBER [phone]  Phone:  [phone]     BASAGLAR 100 UNIT/ML injection    blood glucose monitoring lancets    blood glucose monitoring test strip    insulin glargine 100 UNIT/ML injection    insulin pen needle 32G X 4 MM                Primary Care Provider Office Phone # Fax #    Rubio Cronin [phone]       [address]  [facility] [address]        Equal Access to Services     RA JORDAN : Hadii aad ku hadasho Soomaali, waaxda luqadaha, qaybta kaalmada adeegyada, waxay idiin hayaan adeeg kharash la'aan ah. So [facility] [phone].    ATENCIÓN: Si habla español, tiene a kohli disposición servicios gratuitos de asistencia lingüística. Jimboame al [phone].    We comply with applicable federal civil rights laws and [place] laws. We do not discriminate on the basis of race, color, national origin, age, disability, sex, sexual orientation, or gender identity.            Thank you!     Thank you for choosing [facility]  for your care. Our goal is always to provide you with excellent care. Hearing back from our patients is one way we can continue to improve our services. Please take a few minutes to complete the written survey that you may receive in the mail after your visit with us. Thank you!             Your Updated Medication List - Protect others around you: Learn how to safely use, store and throw away your medicines at www.disposemymeds.org.          This list is accurate as of 4/26/18 11:59 PM.  Always use your most recent med list.                   Brand Name Dispense Instructions for use Diagnosis    acetaminophen 325 MG tablet    TYLENOL    60 tablet    Take 1-2 tablets (325-650 mg) by mouth every 6 hours as needed for mild pain    Abdominal abscess (H)       blood glucose lancets standard    no brand specified    100 Box    Use to test blood sugar four times daily or as directed.    Type 2 diabetes mellitus without complication, without  long-term current use of insulin (H)       blood glucose monitoring lancets     100 each    Use to test blood sugar 2 times daily or as directed.  Ok to substitute alternative if insurance prefers.    Type 2 diabetes mellitus with hyperglycemia, without long-term current use of insulin (H)       * blood glucose monitoring meter device kit    no brand specified    1 kit    Use to test blood sugar four times daily or as directed.    Type 2 diabetes mellitus without complication, without long-term current use of insulin (H)       * blood glucose monitoring meter device kit    no brand specified    1 kit    Use to test blood sugar 4 times daily or as directed.    Type 2 diabetes mellitus with complication, without long-term current use of insulin (H)       * blood glucose monitoring test strip    no brand specified    100 each    Use to test blood sugars four times daily or as directed    Type 2 diabetes mellitus without complication, without long-term current use of insulin (H)       * blood glucose monitoring test strip    no brand specified    100 strip    1 strip by In Vitro route 4 times daily (before meals and nightly) Use to test blood sugars 4 times daily or as directed    Type 2 diabetes mellitus with complication, without long-term current use of insulin (H)       * blood glucose monitoring test strip    GELACIO CONTOUR NEXT    100 strip    Use to test blood sugar 2 times daily or as directed.  Ok to substitute alternative if insurance prefers.    Type 2 diabetes mellitus with hyperglycemia, without long-term current use of insulin (H)       Wfmivvn-Blkgu-Hzrsjhmu-TenofAF [phone] MG Tabs per tablet    GENVOYA    30 tablet    Take 1 tablet by mouth daily (with breakfast)    AIDS (acquired immune deficiency syndrome) (H)       * insulin glargine 100 UNIT/ML injection    LANTUS SOLOSTAR    15 mL    Take 7 units each morning    Type 2 diabetes mellitus with hyperglycemia, without long-term current use of  insulin (H)       * BASAGLAR 100 UNIT/ML injection     15 mL    Inject 7 units once daily subcutaneously    Type 2 diabetes mellitus (H)       * insulin pen needle 31G X 8 MM    B-D U/F    100 each    Use 1 pen needles daily or as directed.    Type 2 diabetes mellitus without complication, without long-term current use of insulin (H)       * insulin pen needle 32G X 4 MM    BD SCOTT U/F    100 each    Use 1 daily as directed.    Type 2 diabetes mellitus with hyperglycemia, without long-term current use of insulin (H)       omeprazole 20 MG CR capsule    priLOSEC    90 capsule    TAKE ONE CAPSULE BY MOUTH EVERY DAY    Epigastric pain       * Notice:  This list has 9 medication(s) that are the same as other medications prescribed for you. Read the directions carefully, and ask your doctor or other care provider to review them with you.

## 2018-04-28 ENCOUNTER — HOSPITAL ENCOUNTER (EMERGENCY)
Facility: CLINIC | Age: 53
Discharge: HOME | End: 2018-04-28
Attending: EMERGENCY MEDICINE | Admitting: EMERGENCY MEDICINE
Payer: COMMERCIAL

## 2018-04-28 VITALS
HEART RATE: 93 BPM | BODY MASS INDEX: 28.96 KG/M2 | OXYGEN SATURATION: 100 % | SYSTOLIC BLOOD PRESSURE: 120 MMHG | RESPIRATION RATE: 16 BRPM | DIASTOLIC BLOOD PRESSURE: 79 MMHG | WEIGHT: 173.8 LBS | HEIGHT: 65 IN | TEMPERATURE: 97.8 F

## 2018-04-28 DIAGNOSIS — E13.9 OTHER SPECIFIED DIABETES MELLITUS WITHOUT COMPLICATION, WITH LONG-TERM CURRENT USE OF INSULIN (H): ICD-10-CM

## 2018-04-28 DIAGNOSIS — Z79.4 OTHER SPECIFIED DIABETES MELLITUS WITHOUT COMPLICATION, WITH LONG-TERM CURRENT USE OF INSULIN (H): ICD-10-CM

## 2018-04-28 LAB
GLUCOSE BLDC GLUCOMTR-MCNC: 287 MG/DL (ref 70–99)
HIV1 RNA # PLAS NAA DL=20: NORMAL {COPIES}/ML
HIV1 RNA SERPL NAA+PROBE-LOG#: NORMAL {LOG_COPIES}/ML

## 2018-04-28 PROCEDURE — 00000146 ZZHCL STATISTIC GLUCOSE BY METER IP

## 2018-04-28 PROCEDURE — 99284 EMERGENCY DEPT VISIT MOD MDM: CPT | Performed by: EMERGENCY MEDICINE

## 2018-04-28 PROCEDURE — 99283 EMERGENCY DEPT VISIT LOW MDM: CPT | Mod: Z6 | Performed by: EMERGENCY MEDICINE

## 2018-04-28 PROCEDURE — 96372 THER/PROPH/DIAG INJ SC/IM: CPT | Performed by: EMERGENCY MEDICINE

## 2018-04-28 PROCEDURE — 25000131 ZZH RX MED GY IP 250 OP 636 PS 637: Performed by: EMERGENCY MEDICINE

## 2018-04-28 RX ADMIN — INSULIN GLARGINE 7 UNITS: 100 INJECTION, SOLUTION SUBCUTANEOUS at 19:29

## 2018-04-28 ASSESSMENT — VISUAL ACUITY
OS: 20/40
OD: 20/25
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
## 2018-04-28 NOTE — ED AVS SNAPSHOT
[facility] [place], Emergency Department    [address] [place] [address]    Phone:  [phone]                                       Andrew Lockwood   MRN: [id number]    Department:  [facility], Emergency Department   Date of Visit:  4/28/2018           After Visit Summary Signature Page     I have received my discharge instructions, and my questions have been answered. I have discussed any challenges I see with this plan with the nurse or doctor.    ..........................................................................................................................................  Patient/Patient Representative Signature      ..........................................................................................................................................  Patient Representative Print Name and Relationship to Patient    ..................................................               ................................................  Date                                            Time    ..........................................................................................................................................  Reviewed by Signature/Title    ...................................................              ..............................................  Date                                                            Time

## 2018-04-28 NOTE — ED AVS SNAPSHOT
[facility], Emergency Department    [address] [address]    Phone:  [phone]                                       Andrew Lockwood   MRN: [id number]    Department:  [facility], Emergency Department   Date of Visit:  4/28/2018           Patient Information     Date Of Birth          11/27/1967        Your diagnoses for this visit were:     Other specified diabetes mellitus without complication, with long-term current use of insulin (H)        You were seen by Pieter Bruno MD.        Discharge Instructions       Get your medication refilled tomorrow.    Please make an appointment to follow up with Your Primary Care Provider as needed.    Your next 10 appointments already scheduled     May 01, 2018  3:40 PM CDT   (Arrive by 3:25 PM)   Return Visit with Dawn Haq MD   [facility] ([facility] [place])    [address] [place] [address]  [address] [facility] [address] [phone]              24 Hour Appointment Hotline       To make an appointment at any [facility], call [phone] ([phone]). If you don't have a family doctor or clinic, we will help you find one. [facility] clinics are conveniently located to serve the needs of you and your family.             Review of your medicines      Our records show that you are taking the medicines listed below. If these are incorrect, please call your family doctor or clinic.        Dose / Directions Last dose taken    acetaminophen 325 MG tablet   Commonly known as:  TYLENOL   Dose:  325-650 mg   Quantity:  60 tablet        Take 1-2 tablets (325-650 mg) by mouth every 6 hours as needed for mild pain   Refills:  0        blood glucose lancets standard   Commonly known as:  no brand specified   Quantity:  100 Box        Use to test blood sugar four times daily or as directed.   Refills:  5        blood glucose monitoring lancets   Quantity:  100 each        Use to test blood sugar 2  times daily or as directed.  Ok to substitute alternative if insurance prefers.   Refills:  11        * blood glucose monitoring meter device kit   Commonly known as:  no brand specified   Quantity:  1 kit        Use to test blood sugar four times daily or as directed.   Refills:  0        * blood glucose monitoring meter device kit   Commonly known as:  no brand specified   Quantity:  1 kit        Use to test blood sugar 4 times daily or as directed.   Refills:  0        * blood glucose monitoring test strip   Commonly known as:  no brand specified   Quantity:  100 each        Use to test blood sugars four times daily or as directed   Refills:  5        * blood glucose monitoring test strip   Commonly known as:  no brand specified   Dose:  1 strip   Quantity:  100 strip        1 strip by In Vitro route 4 times daily (before meals and nightly) Use to test blood sugars 4 times daily or as directed   Refills:  11        * blood glucose monitoring test strip   Commonly known as:  GELACIO CONTOUR NEXT   Quantity:  100 strip        Use to test blood sugar 2 times daily or as directed.  Ok to substitute alternative if insurance prefers.   Refills:  11        Dkhrzyd-Sicam-Bnimzuwz-TenofAF [phone] MG Tabs per tablet   Commonly known as:  GENVOYA   Dose:  1 tablet   Indication:  Positive HIV Test   Quantity:  30 tablet        Take 1 tablet by mouth daily (with breakfast)   Refills:  0        * insulin glargine 100 UNIT/ML injection   Commonly known as:  LANTUS SOLOSTAR   Quantity:  15 mL        Take 7 units each morning   Refills:  3        * BASAGLAR 100 UNIT/ML injection   Quantity:  15 mL        Inject 7 units once daily subcutaneously   Refills:  3        * insulin pen needle 31G X 8 MM   Commonly known as:  B-D U/F   Quantity:  100 each        Use 1 pen needles daily or as directed.   Refills:  0        * insulin pen needle 32G X 4 MM   Commonly known as:  BD SCOTT U/F   Quantity:  100 each        Use 1 daily as  directed.   Refills:  11        omeprazole 20 MG CR capsule   Commonly known as:  priLOSEC   Quantity:  90 capsule        TAKE ONE CAPSULE BY MOUTH EVERY DAY   Refills:  1        * Notice:  This list has 9 medication(s) that are the same as other medications prescribed for you. Read the directions carefully, and ask your doctor or other care provider to review them with you.            Procedures and tests performed during your visit     Glucose by meter    Glucose monitor nursing POCT      Orders Needing Specimen Collection     None      Pending Results     Date and Time Order Name Status Description    4/26/2018 1134 PHENOSENSE GT PLUS INTEGRASE In process             Pending Culture Results     No orders found from 4/26/2018 to 4/29/2018.            Pending Results Instructions     If you had any lab results that were not finalized at the time of your Discharge, you can call the ED Lab Result RN at [phone]. You will be contacted by this team for any positive Lab results or changes in treatment. The nurses are available 7 days a week from 10A to 6:30P.  You can leave a message 24 hours per day and they will return your call.        Thank you for choosing [facility]       Thank you for choosing [facility] for your care. Our goal is always to provide you with excellent care. Hearing back from our patients is one way we can continue to improve our services. Please take a few minutes to complete the written survey that you may receive in the mail after you visit with us. Thank you!        [employer] Information     [employer] gives you secure access to your electronic health record. If you see a primary care provider, you can also send messages to your care team and make appointments. If you have questions, please call your primary care clinic.  If you do not have a primary care provider, please call [phone] and they will assist you.        Care EveryWhere ID     This is your Care EveryWhere ID. This could be used by  other organizations to access your [facility] medical records  [id number]        Equal Access to Services     RA JORDAN : Apollo Cody, maverick centeno, dez jackson. So [facility] [phone].    ATENCIÓN: Si habla español, tiene a kohli disposición servicios gratuitos de asistencia lingüística. Llame al [phone].    We comply with applicable federal civil rights laws and [place] laws. We do not discriminate on the basis of race, color, national origin, age, disability, sex, sexual orientation, or gender identity.            After Visit Summary       This is your record. Keep this with you and show to your community pharmacist(s) and doctor(s) at your next visit.
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
## 2018-04-28 NOTE — DISCHARGE INSTRUCTIONS
Get your medication refilled tomorrow.    Please make an appointment to follow up with Your Primary Care Provider as needed.

## 2018-04-30 LAB
PHENOSENSE GT EER HIV-1 INTEGRASE: NORMAL
SPECIMEN DRAWN SERPL: NORMAL

## 2018-05-01 ENCOUNTER — OFFICE VISIT (OUTPATIENT)
Dept: INTERNAL MEDICINE | Facility: CLINIC | Age: 53
End: 2018-05-01
Payer: COMMERCIAL

## 2018-05-01 VITALS
BODY MASS INDEX: 28.67 KG/M2 | HEART RATE: 118 BPM | SYSTOLIC BLOOD PRESSURE: 119 MMHG | DIASTOLIC BLOOD PRESSURE: 83 MMHG | WEIGHT: 172.3 LBS

## 2018-05-01 DIAGNOSIS — R00.0 SINUS TACHYCARDIA: ICD-10-CM

## 2018-05-01 DIAGNOSIS — R00.2 PALPITATIONS: ICD-10-CM

## 2018-05-01 DIAGNOSIS — F17.210 CIGARETTE NICOTINE DEPENDENCE WITHOUT COMPLICATION: ICD-10-CM

## 2018-05-01 DIAGNOSIS — H53.8 BLURRED VISION: ICD-10-CM

## 2018-05-01 DIAGNOSIS — D17.30 LIPOMA OF SKIN AND SUBCUTANEOUS TISSUE: ICD-10-CM

## 2018-05-01 DIAGNOSIS — E11.8 TYPE 2 DIABETES MELLITUS WITH COMPLICATION, WITH LONG-TERM CURRENT USE OF INSULIN (H): ICD-10-CM

## 2018-05-01 DIAGNOSIS — Z79.4 TYPE 2 DIABETES MELLITUS WITH COMPLICATION, WITH LONG-TERM CURRENT USE OF INSULIN (H): ICD-10-CM

## 2018-05-01 DIAGNOSIS — R85.615: Primary | ICD-10-CM

## 2018-05-01 RX ORDER — NICOTINE 21 MG/24HR
1 PATCH, TRANSDERMAL 24 HOURS TRANSDERMAL EVERY 24 HOURS
Qty: 30 PATCH | Refills: 1 | Status: SHIPPED | OUTPATIENT
Start: 2018-05-01 | End: 2018-06-04

## 2018-05-01 ASSESSMENT — PAIN SCALES - GENERAL: PAINLEVEL: NO PAIN (0)

## 2018-05-01 NOTE — MR AVS SNAPSHOT
After Visit Summary   5/1/2018    Andrew Lockwood    MRN: [id number]           Patient Information     Date Of Birth          11/27/1967        Visit Information        Provider Department      5/1/2018 3:40 PM Dawn Haq MD [facility]        Today's Diagnoses     Unsatisfactory cytology of anal Papanicolaou smear    -  1    Type 2 diabetes mellitus with complication, with long-term current use of insulin (H)        Blurred vision        Cigarette nicotine dependence without complication        Sinus tachycardia        Palpitations        Lipoma of skin and subcutaneous tissue           Follow-ups after your visit        Additional Services     COLORECTAL SURGERY REFERRAL       Your provider has referred you to: [facility]: Colon and Rectal Surgery Clinic [facility] [phone]   [email or web address]    Referral Reason(s): Needs Anal Pap smear, done in ID clinic and indeterminate  Special Concerns: HIV  This referral is: Elective (week +)  It is OK to leave a message on patient's voicemail.    Please be aware that coverage of these services is subject to the terms and limitations of your health insurance plan.  Call member services at your health plan with any benefit or coverage questions.      Please bring the following with you to your appointment:    (1) Any X-Rays, CTs or MRIs which have been performed.  Contact the facility where they were done to arrange for  prior to your scheduled appointment.    (2) List of current medications  (3) This referral request   (4) Any documents/labs given to you for this referral            OPHTHALMOLOGY ADULT REFERRAL       Your provider has referred you to: [facility]: Eye [facility] [phone]   [email or web address]    Please be aware that coverage of these services is subject to the terms and limitations of your health insurance plan.  Call member  services at your health plan with any benefit or coverage questions.      Please bring the following with you to your appointment:    (1) Any X-Rays, CTs or MRIs which have been performed.  Contact the facility where they were done to arrange for  prior to your scheduled appointment.    (2) List of current medications  (3) This referral request   (4) Any documents/labs given to you for this referral            PLASTIC SURGERY REFERRAL       Your provider has referred you to: [facility]: [facility] [place] [phone] [email or web address]: Plastic and Reconstructive Surgery Clinic - [place] [phone]   [email or web address]    Please be aware that coverage of these services is subject to the terms and limitations of your health insurance plan.  Call member services at your health plan with any benefit or coverage questions.      Lipoma on face growing per patient wants it removed  Please bring the following with you to your appointment:    (1) Any X-Rays, CTs or MRIs which have been performed.  Contact the facility where they were done to arrange for  prior to your scheduled appointment.    (2) List of current medications  (3) This referral request   (4) Any documents/labs given to you for this referral                  Your next 10 appointments already scheduled     May 24, 2018  7:00 AM CDT   (Arrive by 6:45 AM)   Return Visit with VIKASH [facility] Colon and Rectal Surgery ([facility])    [address] [facility] [address]   [phone]            May 30, 2018  1:30 PM CDT   RETURN RETINA with Daniella Shah MD   Eye Clinic ([facility])    [address]  [facility] [address]   [phone]            Jun 08, 2018 10:30 AM CDT   (Arrive by 10:15 AM)   RETURN HAND with Evin Zamorano MD   [facility] ([facility])    [address] [facility] [address] [phone]            Jun 13, 2018  8:00 AM CDT   (Arrive by 7:45 AM)   New Plastic Surgery with MAHENDRA Walker MD   [facility] ([facility])    [address] [place] [address]  [address] [facility] [address] [phone]           Do not wear perfume.              Who to contact     Please call your clinic at [phone] to:    Ask questions about your health    Make or cancel appointments    Discuss your medicines    Learn about your test results    Speak to your doctor            Additional Information About Your Visit        [employer] Information     [employer] gives you secure access to your electronic health record. If you see a primary care provider, you can also send messages to your care team and make appointments. If you have questions, please call your primary care clinic.  If you do not have a primary care provider, please call [phone] and they will assist you.      [employer] is an electronic gateway that provides easy, online access to your medical records. With [employer], you can request a clinic appointment, read your test results, renew a prescription or communicate with your care team.     To access your existing account, please contact your [facility] or call [phone] for assistance.        Care EveryWhere ID     This is your Care EveryWhere ID. This could be used by other organizations to access your [facility] medical records  [id number]        Your Vitals Were     Pulse BMI (Body Mass Index)                118 28.67 kg/m2           Blood Pressure from Last 3 Encounters:   No data found for BP    Weight from Last 3 Encounters:    No data found for Wt              Today, you had the following     No orders found for display         Today's Medication Changes          These changes are accurate as of 5/1/18 11:59 PM.  If you have any questions, ask your nurse or doctor.               Start taking these medicines.        Dose/Directions    nicotine 21 MG/24HR 24 hr patch   Commonly known as:  NICODERM CQ   Used for:  Cigarette nicotine dependence without complication   Started by:  Dawn Haq MD        Dose:  1 patch   Place 1 patch onto the skin every 24 hours   Quantity:  30 patch   Refills:  1            Where to get your medicines      These medications were sent to [place], [place] - [address]  [address], [facility] [address]    Hours:  TRANSPLANT PHONE NUMBER [phone] Phone:  [phone]     nicotine 21 MG/24HR 24 hr patch                Primary Care Provider Office Phone # Fax #    Rubio Cronin [phone]       [facility] [address]  [facility] [address]        Equal Access to Services     RA JORDAN AH: Hadii carissa ku hadasho Soomaali, waaxda luqadaha, qaybta kaalmada adeegyada, waxay idiin hayaan tristin moreno . So [facility] [phone].    ATENCIÓN: Si habla español, tiene a kohli disposición servicios gratuitos de asistencia lingüística. Llame al [phone].    We comply with applicable federal civil rights laws and [place] laws. We do not discriminate on the basis of race, color, national origin, age, disability, sex, sexual orientation, or gender identity.            Thank you!     Thank you for choosing [facility]  for your care. Our goal is always to provide you with excellent care. Hearing back from our patients is one way we can continue to improve our services. Please take a few minutes to complete the written survey that you may receive in the mail after your visit with us. Thank  you!             Your Updated Medication List - Protect others around you: Learn how to safely use, store and throw away your medicines at www.disposemymeds.org.          This list is accurate as of 5/1/18 11:59 PM.  Always use your most recent med list.                   Brand Name Dispense Instructions for use Diagnosis    acetaminophen 325 MG tablet    TYLENOL    60 tablet    Take 1-2 tablets (325-650 mg) by mouth every 6 hours as needed for mild pain    Abdominal abscess (H)       blood glucose lancets standard    no brand specified    100 Box    Use to test blood sugar four times daily or as directed.    Type 2 diabetes mellitus without complication, without long-term current use of insulin (H)       blood glucose monitoring lancets     100 each    Use to test blood sugar 2 times daily or as directed.  Ok to substitute alternative if insurance prefers.    Type 2 diabetes mellitus with hyperglycemia, without long-term current use of insulin (H)       * blood glucose monitoring meter device kit    no brand specified    1 kit    Use to test blood sugar four times daily or as directed.    Type 2 diabetes mellitus without complication, without long-term current use of insulin (H)       * blood glucose monitoring meter device kit    no brand specified    1 kit    Use to test blood sugar 4 times daily or as directed.    Type 2 diabetes mellitus with complication, without long-term current use of insulin (H)       * blood glucose monitoring test strip    no brand specified    100 each    Use to test blood sugars four times daily or as directed    Type 2 diabetes mellitus without complication, without long-term current use of insulin (H)       * blood glucose monitoring test strip    no brand specified    100 strip    1 strip by In Vitro route 4 times daily (before meals and nightly) Use to test blood sugars 4 times daily or as directed    Type 2 diabetes mellitus with complication, without long-term current use of  insulin (H)       * blood glucose monitoring test strip    GELACIO CONTOUR NEXT    100 strip    Use to test blood sugar 2 times daily or as directed.  Ok to substitute alternative if insurance prefers.    Type 2 diabetes mellitus with hyperglycemia, without long-term current use of insulin (H)       Xzcafhi-Ruwwq-Viahylmi-TenofAF [phone] MG Tabs per tablet    GENVOYA    30 tablet    Take 1 tablet by mouth daily (with breakfast)    AIDS (acquired immune deficiency syndrome) (H)       * insulin glargine 100 UNIT/ML injection    LANTUS SOLOSTAR    15 mL    Take 7 units each morning    Type 2 diabetes mellitus with hyperglycemia, without long-term current use of insulin (H)       * BASAGLAR 100 UNIT/ML injection     15 mL    Inject 7 units once daily subcutaneously    Type 2 diabetes mellitus (H)       * insulin pen needle 31G X 8 MM    B-D U/F    100 each    Use 1 pen needles daily or as directed.    Type 2 diabetes mellitus without complication, without long-term current use of insulin (H)       * insulin pen needle 32G X 4 MM    BD SCOTT U/F    100 each    Use 1 daily as directed.    Type 2 diabetes mellitus with hyperglycemia, without long-term current use of insulin (H)       nicotine 21 MG/24HR 24 hr patch    NICODERM CQ    30 patch    Place 1 patch onto the skin every 24 hours    Cigarette nicotine dependence without complication       omeprazole 20 MG CR capsule    priLOSEC    90 capsule    TAKE ONE CAPSULE BY MOUTH EVERY DAY    Epigastric pain       * Notice:  This list has 9 medication(s) that are the same as other medications prescribed for you. Read the directions carefully, and ask your doctor or other care provider to review them with you.

## 2018-05-01 NOTE — NURSING NOTE
Chief Complaint   Patient presents with     Hospital F/U     pt here following a hosptial visit   derm problem   Pt states has a bump by his left eye    Tarah Scales CMA at 4:03 PM on 5/1/2018.

## 2018-05-03 ENCOUNTER — ALLIED HEALTH/NURSE VISIT (OUTPATIENT)
Dept: CARDIOLOGY | Facility: CLINIC | Age: 53
End: 2018-05-03
Attending: INTERNAL MEDICINE
Payer: COMMERCIAL

## 2018-05-03 DIAGNOSIS — R00.0 SINUS TACHYCARDIA: ICD-10-CM

## 2018-05-03 DIAGNOSIS — R00.2 PALPITATIONS: ICD-10-CM

## 2018-05-03 PROCEDURE — 93225 XTRNL ECG REC<48 HRS REC: CPT | Mod: ZF

## 2018-05-03 PROCEDURE — 93227 XTRNL ECG REC<48 HR R&I: CPT | Performed by: INTERNAL MEDICINE

## 2018-05-03 PROCEDURE — 93226 XTRNL ECG REC<48 HR SCAN A/R: CPT

## 2018-05-03 NOTE — MR AVS SNAPSHOT
After Visit Summary   5/3/2018    Andrew Lockwood    MRN: [id number]           Patient Information     Date Of Birth          11/27/1967        Visit Information        Provider Department      5/3/2018 3:00 PM Tech, [facility] Cvc Monitor, [facility]        Today's Diagnoses     Sinus tachycardia        Palpitations           Follow-ups after your visit        Your next 10 appointments already scheduled     May 24, 2018  7:00 AM CDT   (Arrive by 6:45 AM)   Return Visit with VIKASH [facility]al Surgery ([facility] [employer])    [address] [facility]  [address] [facility] [address]   [phone]            May 30, 2018  1:30 PM CDT   RETURN RETINA with Daniella Shah MD   Eye Clinic ([facility])    [address]  [facility] [address]   [phone]            Jun 08, 2018 10:30 AM CDT   (Arrive by 10:15 AM)   RETURN HAND with Evin Zamorano MD   [facility] ([facility] [employer])    [address] [address] [phone]            Jun 13, 2018  8:00 AM CDT   (Arrive by 7:45 AM)   New Plastic Surgery with  Julien Walker MD   [facility] Plastic and Reconstructive Surgery ([facility] [employer])    [address] [id number] [phone]           Do not wear perfume.              Who to contact     If you have questions or need follow up information about today's clinic visit or your schedule please contact [facility] directly at [phone].  Normal or non-critical lab and imaging results will be communicated to you by MyChart, letter or phone within 4 business days after the clinic has received the results. If you do not hear from us within 7 days, please contact the clinic through MyChart or phone. If you have a critical or  abnormal lab result, we will notify you by phone as soon as possible.  Submit refill requests through [employer] or call your pharmacy and they will forward the refill request to us. Please allow 3 business days for your refill to be completed.          Additional Information About Your Visit        [employer] Information     [employer] gives you secure access to your electronic health record. If you see a primary care provider, you can also send messages to your care team and make appointments. If you have questions, please call your primary care clinic.  If you do not have a primary care provider, please call [phone] and they will assist you.        Care EveryWhere ID     This is your Care EveryWhere ID. This could be used by other organizations to access your [place] medical records  [id number]         Blood Pressure from Last 3 Encounters:   05/01/18 119/83   04/28/18 120/79   04/26/18 113/81    Weight from Last 3 Encounters:   05/01/18 78.2 kg (172 lb 4.8 oz)   04/28/18 78.8 kg (173 lb 12.8 oz)   04/26/18 78.9 kg (174 lb)              We Performed the Following     Holter Monitor 48 hour - Adult        Primary Care Provider Office Phone # Fax #    Rubio Cronin [phone]       [address] [address]        Equal Access to Services     RA JORDAN : Hadii aad ku hadasho Soomaali, waaxda luqadaha, qaybta kaalmada adeegyada, dez salazar. So [facility] [phone].    ATENCIÓN: Si habla español, tiene a kohli disposición servicios gratuitos de asistencia lingüística. Llame al [phone].    We comply with applicable federal civil rights laws and [place] laws. We do not discriminate on the basis of race, color, national origin, age, disability, sex, sexual orientation, or gender identity.            Thank you!     Thank you for choosing [facility]  for your care. Our goal is always to provide you with excellent care.  Hearing back from our patients is one way we can continue to improve our services. Please take a few minutes to complete the written survey that you may receive in the mail after your visit with us. Thank you!             Your Updated Medication List - Protect others around you: Learn how to safely use, store and throw away your medicines at www.disposemymeds.org.          This list is accurate as of 5/3/18  3:57 PM.  Always use your most recent med list.                   Brand Name Dispense Instructions for use Diagnosis    acetaminophen 325 MG tablet    TYLENOL    60 tablet    Take 1-2 tablets (325-650 mg) by mouth every 6 hours as needed for mild pain    Abdominal abscess (H)       blood glucose lancets standard    no brand specified    100 Box    Use to test blood sugar four times daily or as directed.    Type 2 diabetes mellitus without complication, without long-term current use of insulin (H)       blood glucose monitoring lancets     100 each    Use to test blood sugar 2 times daily or as directed.  Ok to substitute alternative if insurance prefers.    Type 2 diabetes mellitus with hyperglycemia, without long-term current use of insulin (H)       * blood glucose monitoring meter device kit    no brand specified    1 kit    Use to test blood sugar four times daily or as directed.    Type 2 diabetes mellitus without complication, without long-term current use of insulin (H)       * blood glucose monitoring meter device kit    no brand specified    1 kit    Use to test blood sugar 4 times daily or as directed.    Type 2 diabetes mellitus with complication, without long-term current use of insulin (H)       * blood glucose monitoring test strip    no brand specified    100 each    Use to test blood sugars four times daily or as directed    Type 2 diabetes mellitus without complication, without long-term current use of insulin (H)       * blood glucose monitoring test strip    no brand specified    100 strip     1 strip by In Vitro route 4 times daily (before meals and nightly) Use to test blood sugars 4 times daily or as directed    Type 2 diabetes mellitus with complication, without long-term current use of insulin (H)       * blood glucose monitoring test strip    GELACIO CONTOUR NEXT    100 strip    Use to test blood sugar 2 times daily or as directed.  Ok to substitute alternative if insurance prefers.    Type 2 diabetes mellitus with hyperglycemia, without long-term current use of insulin (H)       Rcqyjhs-Qqszv-Zhipcmal-TenofAF [phone] MG Tabs per tablet    GENVOYA    30 tablet    Take 1 tablet by mouth daily (with breakfast)    AIDS (acquired immune deficiency syndrome) (H)       * insulin glargine 100 UNIT/ML injection    LANTUS SOLOSTAR    15 mL    Take 7 units each morning    Type 2 diabetes mellitus with hyperglycemia, without long-term current use of insulin (H)       * BASAGLAR 100 UNIT/ML injection     15 mL    Inject 7 units once daily subcutaneously    Type 2 diabetes mellitus (H)       * insulin pen needle 31G X 8 MM    B-D U/F    100 each    Use 1 pen needles daily or as directed.    Type 2 diabetes mellitus without complication, without long-term current use of insulin (H)       * insulin pen needle 32G X 4 MM    BD SCOTT U/F    100 each    Use 1 daily as directed.    Type 2 diabetes mellitus with hyperglycemia, without long-term current use of insulin (H)       nicotine 21 MG/24HR 24 hr patch    NICODERM CQ    30 patch    Place 1 patch onto the skin every 24 hours    Cigarette nicotine dependence without complication       omeprazole 20 MG CR capsule    priLOSEC    90 capsule    TAKE ONE CAPSULE BY MOUTH EVERY DAY    Epigastric pain       * Notice:  This list has 9 medication(s) that are the same as other medications prescribed for you. Read the directions carefully, and ask your doctor or other care provider to review them with you.

## 2018-05-03 NOTE — NURSING NOTE
Per Dr. WELLS, [facility], patient to have 48hr holter monitor placed.  Diagnosis: sinus tachycardia, palpitations  Monitor placed: Yes  Patient Instructed: Yes  Patient verbalized understanding: Yes  Holter # 4  Placed by MAIKOL Rubin

## 2018-05-03 NOTE — LETTER
Patient:  Andrew Lockwood  :   1967  MRN:     [id number]        Mr. Andrew Lockwood  [address]   [facility] [address]        May 9, 2018    Dear Manuel Lockwood,    Thank you for choosing the [facility] for your healthcare needs.  We appreciate the opportunity to serve you.    The following are your recent test results.     Mr. Lockwood,     I reviewed the results of your heart monitoring and everything looks fine. No abnormal heart rhythms, your resting heart rate is right around 100. Nothing further to do unless you have recurrent symptoms. Please call the clinic with questions.     Dawn Haq MD     Results for orders placed or performed in visit on 18   Holter Monitor 48 hour - Adult   Result Value Ref Range    Interpretation Cardiac Monitor Click View Image link to view waveform and result        Please contact your provider if you have any questions or concerns.  We look forward to serving your needs in the future.

## 2018-05-08 LAB — INTERPRETATION MONITOR -MUSE: NORMAL

## 2018-05-08 NOTE — PROGRESS NOTES
Andrew Lockwood MRN# [id number]   YOB: 1967 Age: 50 year old     Date: May 1, 2018     Reason for visit: hospital follow-up     HPI:   Mr. Andrew Lockwood is a 50 year old male with medical history notable for HIV, GERD, DMII, remote hx CNS Toxoplasmosis w/recurrence during periods of poor medication compliance, hx appendectomy c/b abscess, and recent SBO laparoscopy and lysis of adhesions on 4/16/18 who presents today for hospital follow-up. Patient states he is not sure why he is here today, hospital made his appointment, he typically does most of his primary care with his ID/HIV physician, Dr. Aldrich, who he saw in clinic on 4/26.     Overall doing quite well. Abdominal pain has resolved, surgical sites are well healed, he is having regular BMs and plassing flatus easily without pain. Overall is feeling quite good. Does note he has a few minor things he would like to discuss today.    First, he has a small flesh colored soft lesions (1x1cm) just medial to the inner corner of his left eye that he wants removed, he thinks it is just fatty tissue but that it sometimes bothers him because it rubs his glasses and is unsightly. Thinks it is growing, Not sure how long it has been there, thinks a few months.     Second, he needs to have an anal pap done, as last one was indeterminate. Was referred to Colorectal but never made appointment, is requesting additional Colorectal referral today.    Next, he reports he has not seen an eye doctor in almost 2 years, and thinks that his vision is slowly getting worse. He has DMII, should have regular eye exams for this as well, Requesting ophtho  referral today.      Next, he is currently a 1ppd smoker, is trying to quit. Does not want to use any oral medications, not interested in pharmacy counseling for quitting, but is requesting nicotine patches as they have worked well for him in the past.     Last,  He notes that for the past few weeks-months he has noticed  occasional sensation like his heart is pounding really quickly and beating irregularly. Has no hx arrhythmia or cardiac disease. Denies associated chest pain, SOB, dizziness, or syncopal episodes, but is asking that this be further evaluated.     Patient Active Problem List   Diagnosis     Allergic rhinitis due to other allergen     Brain lesion     Toxoplasma encephalitis (H)     Pulmonary nodules     Human immunodeficiency virus I infection (H)     Folliculitis     Prurigo nodularis     Epidermal cyst     Shoulder joint pain, unspecified laterality     CNS toxoplasmosis (H)     Constipation     Non-intractable vomiting with nausea, vomiting of unspecified type     Thrush     Insomnia, unspecified insomnia     Bowel obstruction     Toxoplasmosis     Gastroesophageal reflux disease     Headache     Aphthous ulcer     Type 2 diabetes mellitus (H)     Small bowel obstruction     SBO (small bowel obstruction)     Slow transit constipation     Periungual wart     Herpes zoster     Erectile dysfunction, unspecified erectile dysfunction type     Insomnia, unspecified type     Preventative health care     Pain of right thumb     Rupture of ulnar collateral ligament of right thumb     Aftercare following surgery of the musculoskeletal system     Panic disorder     Generalized anxiety disorder     Major depressive disorder, single episode, unspecified     Abdominal pain     Acute appendicitis with localized peritonitis     S/P appendectomy     Abdominal abscess (H)     Appendicitis     Otitis media     Horseshoe tear of retina of left eye without detachment       ROS: Complete 10 point review of systems negative unless mentioned in HPI    Current Outpatient Prescriptions   Medication     acetaminophen (TYLENOL) 325 MG tablet     BASAGLAR 100 UNIT/ML injection     blood glucose (NO BRAND SPECIFIED) lancets standard     blood glucose monitoring (GELACIO CONTOUR NEXT) test strip     blood glucose monitoring (GELACIO MICROLET)  lancets     blood glucose monitoring (NO BRAND SPECIFIED) meter device kit     blood glucose monitoring (NO BRAND SPECIFIED) meter device kit     blood glucose monitoring (NO BRAND SPECIFIED) test strip     blood glucose monitoring (NO BRAND SPECIFIED) test strip     GENVOYA [phone] mg tablet     insulin glargine (LANTUS SOLOSTAR) 100 UNIT/ML injection     insulin pen needle (B-D U/F) 31G X 8 MM     insulin pen needle (BD SCOTT U/F) 32G X 4 MM     nicotine (NICODERM CQ) 21 MG/24HR 24 hr patch     omeprazole (PRILOSEC) 20 MG CR capsule     No current facility-administered medications for this visit.       Exam:  /83  Pulse 118  Wt 78.2 kg (172 lb 4.8 oz)  BMI 28.67 kg/m2  General: NAD, pleasant and cooperative with interview and exam, very effusive and jovial   HEENT: NCAT, moist oral mucosa, oropharynx clear, anicteric sclera, 1x1cm flesh colored, raised, non-tender lesion just emdial to left eye   Neck: no cervical lymphadenopathy or thyromegaly appreciated  CV: Tachycardic, regular rhythm, Normal S1, S2, no murmurs, rubs, thrills  Resp: non-labored respirations, CTAB, no rhonchi, crackles, wheezes appreciated  Abdomen: soft, nontender, nondistended, normoactive bs, no HSM  Extremities: WWP, no LE edema bilaterally  Skin: Warm and dry, no jaundice, rash, or concerning lesions  Neurological: alert and oriented, language fluent, no dysarthria, no gait abnormalities appreciated.  Psychological: appropriate mood     Labs:   All recent labs reviewed by me and discussed with patient.     Imaging:   All recent imaging reviewed by me and discussed with patient.     A&P:   Mr. Andrew Lockwood is a 50 year old male with medical history notable for HIV, GERD, DMII, remote hx CNS Toxoplasmosis w/recurrence during periods of poor medication compliance, hx appendectomy c/b abscess, and recent SBO laparoscopy and lysis of adhesions on 4/16/18 who presents today for hospital follow-up. Patient states he is not sure why  he is here today, hospital made his appointment, he typically does most of his primary care with his ID/HIV physician, Dr. Aldrich, who he saw in clinic on 4/26.     # Sinus tachycardia  # Palpitations   Reporting several weeks-months of intermittent palpitations, heart pounding, though no other associated symptoms. Is in sinus tachy 118 today, per chart review is frequently in sinus tachycardia. Patient requesting further workup. Will start with Holter monitor to assess for underlying arrhythmias.   -     Holter Monitor 48 hour - Adult; Future    #  Unsatisfactory cytology of anal Papanicolaou smear  HIV+, has receptive anal sex with men, though not currently sexually active with partner. Last anal pap indeterminate, needs repeat with Colorectal surgery but did not make appt, requesting repeat referral today.   -     COLORECTAL SURGERY REFERRAL    # Type 2 diabetes mellitus with complication, with long-term current use of insulin (H)  # Blurred vision  Has not had visit in 2 years per patient, also thinks vision is slowly worsening. Counseled that given DMII he should have annual exams.   -     OPHTHALMOLOGY ADULT REFERRAL    # Cigarette nicotine dependence without complication  Currently 1ppd smoker, wanting to quit, has had success with nicotine patches before, counseled on risks/benefits appropriate use of medications. Reported that he will need to follow up in 2-4 weeks for decreased dose of patch.   -     nicotine (NICODERM CQ) 21 MG/24HR 24 hr patch; Place 1 patch onto the skin every 24 hours    Lipoma of skin and subcutaneous tissue  Small lesion just medial to inner corner of left eye, flesh colored, soft, mobile, may be lipoma. Patient wants this removed, referred to plastic surgery given proximity to eye.   -     PLASTIC SURGERY REFERRAL    # Healthcare Maintenance:  - BP: 119/83  - Lipids: Done 08/2016, Cholesterol 180, LDL 72  - Diabetes Screening: Has DMII, follows with endocrine  - Colonoscopy: up to  date, had colonoscopy done at [facility]  - Immunizations: All up to date, followed by ID  -HCV Screening:Negative    Return to clinic: as needed, 6-12 months    Patient care plan discussed with attending physician, Dr. Rodríguez, who agreed with above.    Dawn Haq MD  Internal Medicine, PGY-3  [phone]  While the patient was in clinic, I reviewed the pertinent medical history and results.  I discussed the current findings on physical examination, as well as the patient s diagnosis and treatment plan with the resident and agree with the information as documented with the following exceptions: none.  Prince Rodríguez

## 2018-05-10 ENCOUNTER — HOSPITAL ENCOUNTER (EMERGENCY)
Facility: CLINIC | Age: 53
Discharge: HOME | End: 2018-05-10
Attending: EMERGENCY MEDICINE | Admitting: EMERGENCY MEDICINE
Payer: COMMERCIAL

## 2018-05-10 ENCOUNTER — APPOINTMENT (OUTPATIENT)
Dept: GENERAL RADIOLOGY | Facility: CLINIC | Age: 53
End: 2018-05-10
Attending: EMERGENCY MEDICINE
Payer: COMMERCIAL

## 2018-05-10 VITALS
HEART RATE: 97 BPM | SYSTOLIC BLOOD PRESSURE: 104 MMHG | DIASTOLIC BLOOD PRESSURE: 73 MMHG | TEMPERATURE: 98.8 F | OXYGEN SATURATION: 100 % | WEIGHT: 177.1 LBS | BODY MASS INDEX: 29.47 KG/M2

## 2018-05-10 DIAGNOSIS — Z79.4 ENCOUNTER FOR LONG-TERM (CURRENT) USE OF INSULIN (H): ICD-10-CM

## 2018-05-10 DIAGNOSIS — J20.9 ACUTE BRONCHITIS: ICD-10-CM

## 2018-05-10 DIAGNOSIS — F17.210 CIGARETTE SMOKER: ICD-10-CM

## 2018-05-10 DIAGNOSIS — E11.9 TYPE 2 DIABETES MELLITUS WITHOUT COMPLICATION, WITHOUT LONG-TERM CURRENT USE OF INSULIN (H): ICD-10-CM

## 2018-05-10 DIAGNOSIS — Z21 HUMAN IMMUNODEFICIENCY VIRUS I INFECTION (H): ICD-10-CM

## 2018-05-10 DIAGNOSIS — J22 LOWER RESPIRATORY INFECTION: ICD-10-CM

## 2018-05-10 LAB
ANION GAP SERPL CALCULATED.3IONS-SCNC: 10 MMOL/L (ref 3–14)
BASOPHILS # BLD AUTO: 0 10E9/L (ref 0–0.2)
BASOPHILS NFR BLD AUTO: 0.5 %
BUN SERPL-MCNC: 14 MG/DL (ref 7–30)
CALCIUM SERPL-MCNC: 9 MG/DL (ref 8.5–10.1)
CHLORIDE SERPL-SCNC: 100 MMOL/L (ref 94–109)
CO2 SERPL-SCNC: 26 MMOL/L (ref 20–32)
CREAT SERPL-MCNC: 0.76 MG/DL (ref 0.66–1.25)
DIFFERENTIAL METHOD BLD: ABNORMAL
EOSINOPHIL # BLD AUTO: 0.3 10E9/L (ref 0–0.7)
EOSINOPHIL NFR BLD AUTO: 3.2 %
ERYTHROCYTE [DISTWIDTH] IN BLOOD BY AUTOMATED COUNT: 13.5 % (ref 10–15)
GFR SERPL CREATININE-BSD FRML MDRD: >90 ML/MIN/1.7M2
GLUCOSE BLDC GLUCOMTR-MCNC: 368 MG/DL (ref 70–99)
GLUCOSE BLDC GLUCOMTR-MCNC: 442 MG/DL (ref 70–99)
GLUCOSE SERPL-MCNC: 371 MG/DL (ref 70–99)
HCT VFR BLD AUTO: 38.4 % (ref 40–53)
HGB BLD-MCNC: 13.1 G/DL (ref 13.3–17.7)
IMM GRANULOCYTES # BLD: 0.1 10E9/L (ref 0–0.4)
IMM GRANULOCYTES NFR BLD: 1 %
LYMPHOCYTES # BLD AUTO: 2.3 10E9/L (ref 0.8–5.3)
LYMPHOCYTES NFR BLD AUTO: 29.4 %
MCH RBC QN AUTO: 31.2 PG (ref 26.5–33)
MCHC RBC AUTO-ENTMCNC: 34.1 G/DL (ref 31.5–36.5)
MCV RBC AUTO: 91 FL (ref 78–100)
MONOCYTES # BLD AUTO: 0.7 10E9/L (ref 0–1.3)
MONOCYTES NFR BLD AUTO: 8.7 %
NEUTROPHILS # BLD AUTO: 4.5 10E9/L (ref 1.6–8.3)
NEUTROPHILS NFR BLD AUTO: 57.2 %
NRBC # BLD AUTO: 0 10*3/UL
NRBC BLD AUTO-RTO: 0 /100
PLATELET # BLD AUTO: 241 10E9/L (ref 150–450)
POTASSIUM SERPL-SCNC: 3.5 MMOL/L (ref 3.4–5.3)
RBC # BLD AUTO: 4.2 10E12/L (ref 4.4–5.9)
SODIUM SERPL-SCNC: 136 MMOL/L (ref 133–144)
WBC # BLD AUTO: 7.9 10E9/L (ref 4–11)

## 2018-05-10 PROCEDURE — 85025 COMPLETE CBC W/AUTO DIFF WBC: CPT | Performed by: EMERGENCY MEDICINE

## 2018-05-10 PROCEDURE — 99284 EMERGENCY DEPT VISIT MOD MDM: CPT | Mod: 25

## 2018-05-10 PROCEDURE — 99284 EMERGENCY DEPT VISIT MOD MDM: CPT | Mod: Z6 | Performed by: EMERGENCY MEDICINE

## 2018-05-10 PROCEDURE — 80048 BASIC METABOLIC PNL TOTAL CA: CPT | Performed by: EMERGENCY MEDICINE

## 2018-05-10 PROCEDURE — 25000128 H RX IP 250 OP 636: Performed by: EMERGENCY MEDICINE

## 2018-05-10 PROCEDURE — 96374 THER/PROPH/DIAG INJ IV PUSH: CPT

## 2018-05-10 PROCEDURE — 96361 HYDRATE IV INFUSION ADD-ON: CPT | Mod: XU

## 2018-05-10 PROCEDURE — 25000132 ZZH RX MED GY IP 250 OP 250 PS 637: Performed by: EMERGENCY MEDICINE

## 2018-05-10 PROCEDURE — 71046 X-RAY EXAM CHEST 2 VIEWS: CPT

## 2018-05-10 PROCEDURE — 00000146 ZZHCL STATISTIC GLUCOSE BY METER IP

## 2018-05-10 RX ORDER — ACETAMINOPHEN 325 MG/1
650 TABLET ORAL EVERY 4 HOURS PRN
Status: DISCONTINUED | OUTPATIENT
Start: 2018-05-10 | End: 2018-05-10 | Stop reason: HOSPADM

## 2018-05-10 RX ORDER — DEXTROMETHORPHAN POLISTIREX 30 MG/5ML
30 SUSPENSION ORAL ONCE
Status: COMPLETED | OUTPATIENT
Start: 2018-05-10 | End: 2018-05-10

## 2018-05-10 RX ORDER — AZITHROMYCIN 250 MG/1
TABLET, FILM COATED ORAL
Qty: 6 TABLET | Refills: 0 | Status: SHIPPED | OUTPATIENT
Start: 2018-05-10 | End: 2018-05-15

## 2018-05-10 RX ADMIN — DEXTROMETHORPHAN 30 MG: 30 SUSPENSION, EXTENDED RELEASE ORAL at 03:55

## 2018-05-10 RX ADMIN — SODIUM CHLORIDE 1000 ML: 9 INJECTION, SOLUTION INTRAVENOUS at 03:55

## 2018-05-10 RX ADMIN — HUMAN INSULIN 4 UNITS: 100 INJECTION, SOLUTION SUBCUTANEOUS at 03:56

## 2018-05-10 RX ADMIN — ACETAMINOPHEN 650 MG: 325 TABLET, FILM COATED ORAL at 05:03

## 2018-05-10 NOTE — ED AVS SNAPSHOT
[facility], Emergency Department    [address] [address]    Phone:  [phone]                                       Andrew Lockwood   MRN: [id number]    Department:  [facility], Emergency Department   Date of Visit:  5/10/2018           Patient Information     Date Of Birth          11/27/1967        Your diagnoses for this visit were:     Acute bronchitis, unspecified organism        You were seen by Angelo Estes MD.        Discharge Instructions         Acute Bronchitis  Your healthcare provider has told you that you have acute bronchitis. Bronchitis is infection or inflammation of the bronchial tubes (airways in the lungs). Normally, air moves easily in and out of the airways. Bronchitis narrows the airways, making it harder for air to flow in and out of the lungs. This causes symptoms such as shortness of breath, coughing up yellow or green mucus, and wheezing. Bronchitis can be acute or chronic. Acute means the condition comes on quickly and goes away in a short time, usually within 3 to 10 days. Chronic means a condition lasts a long time and often comes back.    What causes acute bronchitis?  Acute bronchitis almost always starts as a viral respiratory infection, such as a cold or the flu. Certain factors make it more likely for a cold or flu to turn into bronchitis. These include being very young, being elderly, having a heart or lung problem, or having a weak immune system. Cigarette smoking also makes bronchitis more likely.  When bronchitis develops, the airways become swollen. The airways may also become infected with bacteria. This is known as a secondary infection.  Diagnosing acute bronchitis  Your healthcare provider will examine you and ask about your symptoms and health history. You may also have a sputum culture to test the fluid in your lungs. Chest X-rays may be done to look for infection in the lungs.  Treating acute bronchitis  Bronchitis usually clears up  as the cold or flu goes away. You can help feel better faster by doing the following:    Take medicine as directed. You may be told to take ibuprofen or other over-the-counter medicines. These help relieve inflammation in your bronchial tubes. Your healthcare provider may prescribe an inhaler to help open up the bronchial tubes. Most of the time, acute bronchitis is caused by a viral infection. Antibiotics are usually not prescribed for viral infections.    Drink plenty of fluids, such as water, juice, or warm soup. Fluids loosen mucus so that you can cough it up. This helps you breathe more easily. Fluids also prevent dehydration.    Make sure you get plenty of rest.    Do not smoke. Do not allow anyone else to smoke in your home.  Recovery and follow-up  Follow up with your doctor as you are told. You will likely feel better in a week or two. But a dry cough can linger beyond that time. Let your doctor know if you still have symptoms (other than a dry cough) after 2 weeks, or if you re prone to getting bronchial infections. Take steps to protect yourself from future infections. These steps include stopping smoking and avoiding tobacco smoke, washing your hands often, and getting a yearly flu shot.  When to call your healthcare provider  Call the healthcare provider if you have any of the following:    Fever of 100.4 F (38.0 C) or higher, or as advised    Symptoms that get worse, or new symptoms    Trouble breathing    Symptoms that don t start to improve within a week, or within 3 days of taking antibiotics   Date Last Reviewed: 12/1/2016 2000-2017 [employer]. [address] [place] [address], [place] [address]. All rights reserved. This information is not intended as a substitute for professional medical care. Always follow your healthcare professional's instructions.          Your next 10 appointments already scheduled     May 24, 2018  7:00 AM CDT   (Arrive by 6:45 AM)   Return Visit with Carrie Nogueira  VIKASH Mary CNP   [facility] Colon and Rectal Surgery ([facility] [place])    [address]  [address] [facility] [address]   [phone]            May 30, 2018  1:30 PM CDT   RETURN RETINA with Daniella Shah MD   Eye Clinic ([facility])    [facility]  [address] [place] [address]  [address] Clin [address]  [facility] [address]   [phone]            Jun 08, 2018 10:30 AM CDT   (Arrive by 10:15 AM)   RETURN HAND with Evin Zamorano MD   [facility] Orthopa[facility] ([facility] [place])    [address]  [address] [facility] [address]   [phone]            Jun 13, 2018  8:00 AM CDT   (Arrive by 7:45 AM)   New Plastic Surgery with MAHENDRA Walker MD   [facility] Plastic and Reconstructive Surgery ([facility] [place])    [address]  [address] [facility] [address]   [phone]           Do not wear perfume.              24 Hour Appointment Hotline       To make an appointment at any [facility], call [phone] ([phone]). If you don't have a family doctor or clinic, we will help you find one. [facility] clinics are conveniently located to serve the needs of you and your family.             Review of your medicines      START taking        Dose / Directions Last dose taken    azithromycin 250 MG tablet   Commonly known as:  ZITHROMAX Z-OLIVERIO   Quantity:  6 tablet        Two tablets on the first day, then one tablet daily for the next 4 days   Refills:  0          Our records show that you are taking the medicines listed below. If these are incorrect, please call your family doctor or clinic.        Dose / Directions Last dose taken    acetaminophen 325 MG tablet   Commonly known as:  TYLENOL   Dose:  325-650 mg   Quantity:  60 tablet        Take 1-2 tablets (325-650 mg) by mouth every 6 hours as needed for mild pain   Refills:  0        blood glucose lancets standard    Commonly known as:  no brand specified   Quantity:  100 Box        Use to test blood sugar four times daily or as directed.   Refills:  5        blood glucose monitoring lancets   Quantity:  100 each        Use to test blood sugar 2 times daily or as directed.  Ok to substitute alternative if insurance prefers.   Refills:  11        * blood glucose monitoring meter device kit   Commonly known as:  no brand specified   Quantity:  1 kit        Use to test blood sugar four times daily or as directed.   Refills:  0        * blood glucose monitoring meter device kit   Commonly known as:  no brand specified   Quantity:  1 kit        Use to test blood sugar 4 times daily or as directed.   Refills:  0        * blood glucose monitoring test strip   Commonly known as:  no brand specified   Quantity:  100 each        Use to test blood sugars four times daily or as directed   Refills:  5        * blood glucose monitoring test strip   Commonly known as:  no brand specified   Dose:  1 strip   Quantity:  100 strip        1 strip by In Vitro route 4 times daily (before meals and nightly) Use to test blood sugars 4 times daily or as directed   Refills:  11        * blood glucose monitoring test strip   Commonly known as:  GELACIO CONTOUR NEXT   Quantity:  100 strip        Use to test blood sugar 2 times daily or as directed.  Ok to substitute alternative if insurance prefers.   Refills:  11        Nyumhwj-Bnxtx-Uamposdm-TenofAF [id number] MG Tabs per tablet   Commonly known as:  GENVOYA   Dose:  1 tablet   Indication:  Positive HIV Test   Quantity:  30 tablet        Take 1 tablet by mouth daily (with breakfast)   Refills:  0        * insulin glargine 100 UNIT/ML injection   Commonly known as:  LANTUS SOLOSTAR   Quantity:  15 mL        Take 7 units each morning   Refills:  3        * BASAGLAR 100 UNIT/ML injection   Quantity:  15 mL        Inject 7 units once daily subcutaneously   Refills:  3        * insulin pen needle 31G X 8  MM   Commonly known as:  B-D U/F   Quantity:  100 each        Use 1 pen needles daily or as directed.   Refills:  0        * insulin pen needle 32G X 4 MM   Commonly known as:  BD SCOTT U/F   Quantity:  100 each        Use 1 daily as directed.   Refills:  11        nicotine 21 MG/24HR 24 hr patch   Commonly known as:  NICODERM CQ   Dose:  1 patch   Quantity:  30 patch        Place 1 patch onto the skin every 24 hours   Refills:  1        omeprazole 20 MG CR capsule   Commonly known as:  priLOSEC   Quantity:  90 capsule        TAKE ONE CAPSULE BY MOUTH EVERY DAY   Refills:  1        * Notice:  This list has 9 medication(s) that are the same as other medications prescribed for you. Read the directions carefully, and ask your doctor or other care provider to review them with you.            Prescriptions were sent or printed at these locations (1 Prescription)                   Other Prescriptions                Printed at Department/Unit printer (1 of 1)         azithromycin (ZITHROMAX Z-OLIVERIO) 250 MG tablet                Procedures and tests performed during your visit     Procedure/Test Number of Times Performed    Basic metabolic panel 1    CBC with platelets differential 1    Chest XR,  PA & LAT 1    Glucose by meter 2      Orders Needing Specimen Collection     None      Pending Results     Date and Time Order Name Status Description    5/10/2018 0302 Chest XR,  PA & LAT Preliminary             Pending Culture Results     No orders found from 5/8/2018 to 5/11/2018.            Pending Results Instructions     If you had any lab results that were not finalized at the time of your Discharge, you can call the ED Lab Result RN at [phone]. You will be contacted by this team for any positive Lab results or changes in treatment. The nurses are available 7 days a week from 10A to 6:30P.  You can leave a message 24 hours per day and they will return your call.        Thank you for choosing [facility]       Thank you for  choosing [facility] for your care. Our goal is always to provide you with excellent care. Hearing back from our patients is one way we can continue to improve our services. Please take a few minutes to complete the written survey that you may receive in the mail after you visit with us. Thank you!        [employer] Information     [employer] gives you secure access to your electronic health record. If you see a primary care provider, you can also send messages to your care team and make appointments. If you have questions, please call your primary care clinic.  If you do not have a primary care provider, please call [phone] and they will assist you.        Care EveryWhere ID     This is your Care EveryWhere ID. This could be used by other organizations to access your [facility] medical records  [id number]        Equal Access to Services     RA JORDAN : Apollo Cody, maverick centeno, luis enrique reyna, dez salazar. So [facility] [phone].    ATENCIÓN: Si habla español, tiene a kohli disposición servicios gratuitos de asistencia lingüística. Llame al [phone].    We comply with applicable federal civil rights laws and [place] laws. We do not discriminate on the basis of race, color, national origin, age, disability, sex, sexual orientation, or gender identity.            After Visit Summary       This is your record. Keep this with you and show to your community pharmacist(s) and doctor(s) at your next visit.

## 2018-05-10 NOTE — ED AVS SNAPSHOT
[facility], [place], Emergency Department    [address] [place] [address]    Phone:  [phone]                                       Andrew Lockwood   MRN: [id number]    Department:  [facility], Emergency Department   Date of Visit:  5/10/2018           After Visit Summary Signature Page     I have received my discharge instructions, and my questions have been answered. I have discussed any challenges I see with this plan with the nurse or doctor.    ..........................................................................................................................................  Patient/Patient Representative Signature      ..........................................................................................................................................  Patient Representative Print Name and Relationship to Patient    ..................................................               ................................................  Date                                            Time    ..........................................................................................................................................  Reviewed by Signature/Title    ...................................................              ..............................................  Date                                                            Time

## 2018-05-10 NOTE — ED TRIAGE NOTES
Pt arrived for abdominal pain and cramps since 8 or 9 pm tonight. He feels distended in the stomach. No vomiting. No alcohol or drug use. Patient says he is coughing up green phlegm.

## 2018-05-10 NOTE — DISCHARGE INSTRUCTIONS
Acute Bronchitis  Your healthcare provider has told you that you have acute bronchitis. Bronchitis is infection or inflammation of the bronchial tubes (airways in the lungs). Normally, air moves easily in and out of the airways. Bronchitis narrows the airways, making it harder for air to flow in and out of the lungs. This causes symptoms such as shortness of breath, coughing up yellow or green mucus, and wheezing. Bronchitis can be acute or chronic. Acute means the condition comes on quickly and goes away in a short time, usually within 3 to 10 days. Chronic means a condition lasts a long time and often comes back.    What causes acute bronchitis?  Acute bronchitis almost always starts as a viral respiratory infection, such as a cold or the flu. Certain factors make it more likely for a cold or flu to turn into bronchitis. These include being very young, being elderly, having a heart or lung problem, or having a weak immune system. Cigarette smoking also makes bronchitis more likely.  When bronchitis develops, the airways become swollen. The airways may also become infected with bacteria. This is known as a secondary infection.  Diagnosing acute bronchitis  Your healthcare provider will examine you and ask about your symptoms and health history. You may also have a sputum culture to test the fluid in your lungs. Chest X-rays may be done to look for infection in the lungs.  Treating acute bronchitis  Bronchitis usually clears up as the cold or flu goes away. You can help feel better faster by doing the following:    Take medicine as directed. You may be told to take ibuprofen or other over-the-counter medicines. These help relieve inflammation in your bronchial tubes. Your healthcare provider may prescribe an inhaler to help open up the bronchial tubes. Most of the time, acute bronchitis is caused by a viral infection. Antibiotics are usually not prescribed for viral infections.    Drink plenty of fluids, such as  water, juice, or warm soup. Fluids loosen mucus so that you can cough it up. This helps you breathe more easily. Fluids also prevent dehydration.    Make sure you get plenty of rest.    Do not smoke. Do not allow anyone else to smoke in your home.  Recovery and follow-up  Follow up with your doctor as you are told. You will likely feel better in a week or two. But a dry cough can linger beyond that time. Let your doctor know if you still have symptoms (other than a dry cough) after 2 weeks, or if you re prone to getting bronchial infections. Take steps to protect yourself from future infections. These steps include stopping smoking and avoiding tobacco smoke, washing your hands often, and getting a yearly flu shot.  When to call your healthcare provider  Call the healthcare provider if you have any of the following:    Fever of 100.4 F (38.0 C) or higher, or as advised    Symptoms that get worse, or new symptoms    Trouble breathing    Symptoms that don t start to improve within a week, or within 3 days of taking antibiotics   Date Last Reviewed: 12/1/2016 2000-2017 [employer]. [address] [place] [address]. All rights reserved. This information is not intended as a substitute for professional medical care. Always follow your healthcare professional's instructions.

## 2018-05-11 ENCOUNTER — OFFICE VISIT (OUTPATIENT)
Dept: EDUCATION SERVICES | Facility: CLINIC | Age: 53
End: 2018-05-11
Payer: COMMERCIAL

## 2018-05-11 ENCOUNTER — HOSPITAL ENCOUNTER (EMERGENCY)
Facility: CLINIC | Age: 53
Discharge: HOME | End: 2018-05-11
Attending: EMERGENCY MEDICINE | Admitting: EMERGENCY MEDICINE
Payer: COMMERCIAL

## 2018-05-11 VITALS
HEART RATE: 101 BPM | WEIGHT: 172 LBS | SYSTOLIC BLOOD PRESSURE: 120 MMHG | OXYGEN SATURATION: 100 % | DIASTOLIC BLOOD PRESSURE: 89 MMHG | HEIGHT: 64 IN | TEMPERATURE: 98.2 F | BODY MASS INDEX: 29.37 KG/M2 | RESPIRATION RATE: 16 BRPM

## 2018-05-11 DIAGNOSIS — E11.65 TYPE 2 DIABETES MELLITUS WITH HYPERGLYCEMIA, WITHOUT LONG-TERM CURRENT USE OF INSULIN (H): ICD-10-CM

## 2018-05-11 DIAGNOSIS — E11.65 TYPE 2 DIABETES MELLITUS WITH HYPERGLYCEMIA, WITHOUT LONG-TERM CURRENT USE OF INSULIN (H): Primary | ICD-10-CM

## 2018-05-11 DIAGNOSIS — R73.9 HYPERGLYCEMIA: ICD-10-CM

## 2018-05-11 LAB
ANION GAP SERPL CALCULATED.3IONS-SCNC: 7 MMOL/L (ref 3–14)
BASOPHILS # BLD AUTO: 0 10E9/L (ref 0–0.2)
BASOPHILS NFR BLD AUTO: 0.4 %
BUN SERPL-MCNC: 15 MG/DL (ref 7–30)
CALCIUM SERPL-MCNC: 9.1 MG/DL (ref 8.5–10.1)
CHLORIDE SERPL-SCNC: 102 MMOL/L (ref 94–109)
CO2 SERPL-SCNC: 26 MMOL/L (ref 20–32)
CREAT SERPL-MCNC: 0.8 MG/DL (ref 0.66–1.25)
DIFFERENTIAL METHOD BLD: NORMAL
EOSINOPHIL # BLD AUTO: 0.2 10E9/L (ref 0–0.7)
EOSINOPHIL NFR BLD AUTO: 3 %
ERYTHROCYTE [DISTWIDTH] IN BLOOD BY AUTOMATED COUNT: 13.2 % (ref 10–15)
GFR SERPL CREATININE-BSD FRML MDRD: >90 ML/MIN/1.7M2
GLUCOSE BLDC GLUCOMTR-MCNC: 183 MG/DL (ref 70–99)
GLUCOSE BLDC GLUCOMTR-MCNC: 268 MG/DL (ref 70–99)
GLUCOSE SERPL-MCNC: 222 MG/DL (ref 70–99)
HCT VFR BLD AUTO: 40 % (ref 40–53)
HGB BLD-MCNC: 13.6 G/DL (ref 13.3–17.7)
IMM GRANULOCYTES # BLD: 0.1 10E9/L (ref 0–0.4)
IMM GRANULOCYTES NFR BLD: 1 %
KETONES BLD-SCNC: 0.1 MMOL/L (ref 0–0.6)
LYMPHOCYTES # BLD AUTO: 2.3 10E9/L (ref 0.8–5.3)
LYMPHOCYTES NFR BLD AUTO: 29.4 %
MCH RBC QN AUTO: 30.9 PG (ref 26.5–33)
MCHC RBC AUTO-ENTMCNC: 34 G/DL (ref 31.5–36.5)
MCV RBC AUTO: 91 FL (ref 78–100)
MONOCYTES # BLD AUTO: 0.7 10E9/L (ref 0–1.3)
MONOCYTES NFR BLD AUTO: 8.9 %
NEUTROPHILS # BLD AUTO: 4.4 10E9/L (ref 1.6–8.3)
NEUTROPHILS NFR BLD AUTO: 57.3 %
NRBC # BLD AUTO: 0 10*3/UL
NRBC BLD AUTO-RTO: 0 /100
PLATELET # BLD AUTO: 251 10E9/L (ref 150–450)
POTASSIUM SERPL-SCNC: 3.5 MMOL/L (ref 3.4–5.3)
RBC # BLD AUTO: 4.4 10E12/L (ref 4.4–5.9)
SODIUM SERPL-SCNC: 135 MMOL/L (ref 133–144)
WBC # BLD AUTO: 7.7 10E9/L (ref 4–11)

## 2018-05-11 PROCEDURE — 96360 HYDRATION IV INFUSION INIT: CPT | Performed by: EMERGENCY MEDICINE

## 2018-05-11 PROCEDURE — 82010 KETONE BODYS QUAN: CPT | Performed by: EMERGENCY MEDICINE

## 2018-05-11 PROCEDURE — 96361 HYDRATE IV INFUSION ADD-ON: CPT | Performed by: EMERGENCY MEDICINE

## 2018-05-11 PROCEDURE — 00000146 ZZHCL STATISTIC GLUCOSE BY METER IP

## 2018-05-11 PROCEDURE — 99283 EMERGENCY DEPT VISIT LOW MDM: CPT | Mod: 25 | Performed by: EMERGENCY MEDICINE

## 2018-05-11 PROCEDURE — 80048 BASIC METABOLIC PNL TOTAL CA: CPT | Performed by: EMERGENCY MEDICINE

## 2018-05-11 PROCEDURE — 25000128 H RX IP 250 OP 636: Performed by: EMERGENCY MEDICINE

## 2018-05-11 PROCEDURE — 99283 EMERGENCY DEPT VISIT LOW MDM: CPT | Mod: Z6 | Performed by: EMERGENCY MEDICINE

## 2018-05-11 PROCEDURE — 85025 COMPLETE CBC W/AUTO DIFF WBC: CPT | Performed by: EMERGENCY MEDICINE

## 2018-05-11 RX ORDER — SODIUM CHLORIDE 9 MG/ML
INJECTION, SOLUTION INTRAVENOUS CONTINUOUS
Status: DISCONTINUED | OUTPATIENT
Start: 2018-05-11 | End: 2018-05-12 | Stop reason: HOSPADM

## 2018-05-11 RX ADMIN — SODIUM CHLORIDE 1000 ML: 900 INJECTION, SOLUTION INTRAVENOUS at 22:22

## 2018-05-11 RX ADMIN — SODIUM CHLORIDE 1000 ML: 900 INJECTION, SOLUTION INTRAVENOUS at 20:40

## 2018-05-11 ASSESSMENT — ENCOUNTER SYMPTOMS
FEVER: 0
SHORTNESS OF BREATH: 0
ABDOMINAL PAIN: 0

## 2018-05-11 ASSESSMENT — VISUAL ACUITY
OS: 20/40
OD: 20/70
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
## 2018-05-11 NOTE — ED AVS SNAPSHOT
" [facility], Emergency Department    [address] [facility] [address]    Phone:  [phone]                                       Andrew Lockwood   MRN: [id number]    Department:  [facility], Emergency Department   Date of Visit:  5/11/2018           Patient Information     Date Of Birth          11/27/1967        Your diagnoses for this visit were:     Hyperglycemia        You were seen by Anthony Walters MD.        Discharge Instructions           Diabetes with High Blood Sugar  You have been treated for high blood sugar (hyperglycemia). This may be because of an infection or other illness, eating too many sweets or starches, or not taking enough insulin.  Home care  Monitor and write down your blood sugar level at least twice a day. Do this before breakfast and before dinner. If you take insulin, record your routine insulin dose as well. Also record any additional doses required based on your sliding scale. Do this for the next 3 to 5 days.  High blood sugar may cause symptoms that you can learn to recognize, such as:    Frequent urination    Thirst    Dizziness    Headache    Shortness of breath    Breath that smells fruity    Nausea or vomiting    Abdominal pain    Drowsiness or loss of consciousness  If you have high-blood-sugar symptoms, use a blood or urine test to find out what your blood sugar level is. If it is above your usual range, use the \"sliding scale\" regular insulin dose prescribed by your healthcare provider. If you were not given a range for your insulin dose, contact your healthcare provider for more advice.  Follow-up care  Follow up with your healthcare provider, or as advised. You may need to meet with your healthcare provider in the next week. You will likely review your blood sugar records together. You may also talk to your provider about adjusting your dose of insulin or other medicine for blood sugar.  When to seek medical advice  Call your healthcare provider right away " if these occur:    High blood sugar symptoms (Symptoms are described above.)    Blood sugar over 300 mg/dl If you can t reach your healthcare provider, go to a hospital emergency room or urgent care center  Date Last Reviewed: 6/1/2016 2000-2017 [employer]. [address] [place] [address]. All rights reserved. This information is not intended as a substitute for professional medical care. Always follow your healthcare professional's instructions.          Your next 10 appointments already scheduled     May 24, 2018  7:00 AM CDT   (Arrive by 6:45 AM)   Return Visit with VIKASH Eden CNP   [facility] Colon and Rectal Surgery ([facility] [place])    [address]  4th Floor  [facility] [address]   [phone]            May 24, 2018  8:30 AM CDT   (Arrive by 8:15 AM)   Return Visit with Sharon Rosado MD   [facility] and Infectious Diseases ([facility] [place])    [address]  [address]  [facility] [address] [phone]            May 30, 2018  1:30 PM CDT   RETURN RETINA with Daniella Shah MD   Eye Clinic ([facility])    [address]  [facility] [address]   [phone]            Jun 04, 2018 11:30 AM CDT   (Arrive by 11:15 AM)   RETURN DIABETES with Lenora Haley PA-C   [facility] Endocrinology ([facility] [place])    [address]  3rd Floor  [facility] [address]   [phone]            Jun 08, 2018 10:30 AM CDT   (Arrive by 10:15 AM)   RETURN HAND with Evin Zamorano MD   [facility] Orthopaedic [facility] ([facility] [place])    [address]  4th Floor  [facility] [address] [phone]            Jun 13, 2018  8:00 AM CDT   (Arrive by 7:45 AM)   New Plastic Surgery with MAHENDRA Walker MD   [facility] Plastic and Reconstructive  Surgery ([facility])    [address]  4th Floor  [facility] [address] [phone]           Do not wear perfume.              24 Hour Appointment Hotline       To make an appointment at any [facility], call [phone] ([phone]). If you don't have a family doctor or clinic, we will help you find one. [place] clinics are conveniently located to serve the needs of you and your family.             Review of your medicines      Our records show that you are taking the medicines listed below. If these are incorrect, please call your family doctor or clinic.        Dose / Directions Last dose taken    acetaminophen 325 MG tablet   Commonly known as:  TYLENOL   Dose:  325-650 mg   Quantity:  60 tablet        Take 1-2 tablets (325-650 mg) by mouth every 6 hours as needed for mild pain   Refills:  0        azithromycin 250 MG tablet   Commonly known as:  ZITHROMAX Z-OLIVERIO   Quantity:  6 tablet        Two tablets on the first day, then one tablet daily for the next 4 days   Refills:  0        * BASAGLAR 100 UNIT/ML injection   Quantity:  15 mL        Inject 7 units once daily subcutaneously   Refills:  3        * LANTUS SOLOSTAR 100 UNIT/ML injection   Quantity:  15 mL   Generic drug:  insulin glargine        Take 14 units each morning   Refills:  3        blood glucose lancets standard   Commonly known as:  no brand specified   Quantity:  100 Box        Use to test blood sugar four times daily or as directed.   Refills:  5        blood glucose monitoring lancets   Quantity:  100 each        Use to test blood sugar 2 times daily or as directed.  Ok to substitute alternative if insurance prefers.   Refills:  11        * blood glucose monitoring meter device kit   Commonly known as:  no brand specified   Quantity:  1 kit        Use to test blood sugar four times daily or as directed.   Refills:  0        * blood glucose monitoring meter device kit   Commonly known as:  no  brand specified   Quantity:  1 kit        Use to test blood sugar 4 times daily or as directed.   Refills:  0        * blood glucose monitoring test strip   Commonly known as:  no brand specified   Quantity:  100 each        Use to test blood sugars four times daily or as directed   Refills:  5        * blood glucose monitoring test strip   Commonly known as:  no brand specified   Dose:  1 strip   Quantity:  100 strip        1 strip by In Vitro route 4 times daily (before meals and nightly) Use to test blood sugars 4 times daily or as directed   Refills:  11        * blood glucose monitoring test strip   Commonly known as:  GELACIO CONTOUR NEXT   Quantity:  100 strip        Use to test blood sugar 2 times daily or as directed.  Ok to substitute alternative if insurance prefers.   Refills:  11        Nabtomg-Jopsc-Xkmghlxu-TenofAF 392-379-504-10 MG Tabs per tablet   Commonly known as:  GENVOYA   Dose:  1 tablet   Indication:  Positive HIV Test   Quantity:  30 tablet        Take 1 tablet by mouth daily (with breakfast)   Refills:  0        * insulin pen needle 31G X 8 MM   Commonly known as:  B-D U/F   Quantity:  100 each        Use 1 pen needles daily or as directed.   Refills:  0        * insulin pen needle 32G X 4 MM   Commonly known as:  BD SCOTT U/F   Quantity:  100 each        Use 1 daily as directed.   Refills:  11        nicotine 21 MG/24HR 24 hr patch   Commonly known as:  NICODERM CQ   Dose:  1 patch   Quantity:  30 patch        Place 1 patch onto the skin every 24 hours   Refills:  1        omeprazole 20 MG CR capsule   Commonly known as:  priLOSEC   Quantity:  90 capsule        TAKE ONE CAPSULE BY MOUTH EVERY DAY   Refills:  1        * Notice:  This list has 9 medication(s) that are the same as other medications prescribed for you. Read the directions carefully, and ask your doctor or other care provider to review them with you.            Procedures and tests performed during your visit     Procedure/Test  Number of Times Performed    Basic metabolic panel 1    CBC with platelets differential 1    Glucose by meter 2    Ketone Beta-Hydroxybutyrate Quantitative 1      Orders Needing Specimen Collection     Ordered          05/11/18 1918  UA reflex to Microscopic and Culture - STAT, Prio: STAT, Needs to be Collected     Scheduled Task Status   05/11/18 1918 Collect UA reflex to Microscopic and Culture Open   Order Class:  PCU Collect                  Pending Results     No orders found from 5/9/2018 to 5/12/2018.            Pending Culture Results     No orders found from 5/9/2018 to 5/12/2018.            Pending Results Instructions     If you had any lab results that were not finalized at the time of your Discharge, you can call the ED Lab Result RN at [phone]. You will be contacted by this team for any positive Lab results or changes in treatment. The nurses are available 7 days a week from 10A to 6:30P.  You can leave a message 24 hours per day and they will return your call.        Thank you for choosing [facility]       Thank you for choosing [facility] for your care. Our goal is always to provide you with excellent care. Hearing back from our patients is one way we can continue to improve our services. Please take a few minutes to complete the written survey that you may receive in the mail after you visit with us. Thank you!        [facility] Information     [employer] gives you secure access to your electronic health record. If you see a primary care provider, you can also send messages to your care team and make appointments. If you have questions, please call your primary care clinic.  If you do not have a primary care provider, please call [phone] and they will assist you.        Care EveryWhere ID     This is your Care EveryWhere ID. This could be used by other organizations to access your [facility] medical records  [id number]        Equal Access to Services     RA JORDAN AH: Apollo Cody  maverick centeno, dez jackson. So [facility] [phone].    ATENCIÓN: Si habla español, tiene a kohli disposición servicios gratuitos de asistencia lingüística. Llame al [phone].    We comply with applicable federal civil rights laws and [place] laws. We do not discriminate on the basis of race, color, national origin, age, disability, sex, sexual orientation, or gender identity.            After Visit Summary       This is your record. Keep this with you and show to your community pharmacist(s) and doctor(s) at your next visit.

## 2018-05-11 NOTE — ED TRIAGE NOTES
Coming in with hyperglycemia. BS running 350 to 357. Went to the clinic and was given 7 units of Insuline in addition to morning dose. Also, mentions vision is blurry.

## 2018-05-11 NOTE — ED AVS SNAPSHOT
[facility] [place], Emergency Department    [address] [place] [address]    Phone:  [phone]                                       Andrew Lockwood   MRN: [id number]    Department:  [facility], Emergency Department   Date of Visit:  5/11/2018           After Visit Summary Signature Page     I have received my discharge instructions, and my questions have been answered. I have discussed any challenges I see with this plan with the nurse or doctor.    ..........................................................................................................................................  Patient/Patient Representative Signature      ..........................................................................................................................................  Patient Representative Print Name and Relationship to Patient    ..................................................               ................................................  Date                                            Time    ..........................................................................................................................................  Reviewed by Signature/Title    ...................................................              ..............................................  Date                                                            Time

## 2018-05-11 NOTE — MR AVS SNAPSHOT
After Visit Summary   5/11/2018    Andrew Lockwood    MRN: [id number]           Patient Information     Date Of Birth          11/27/1967        Visit Information        Provider Department      5/11/2018 11:45 AM Joanne Barrow RN [facility] Diabetes        Care Instructions    1.  Increase Lantus to 14 units daily.  Take another 7 today.    2.  Check your blood sugar daily before food.    3.  Return May 15 at 2:30pm    Joanne Barrow RN,TERRENCEE  [facility]  [address] [place] [address]  Phone: [phone]  [email or web address]              Follow-ups after your visit        Your next 10 appointments already scheduled     May 24, 2018  7:00 AM CDT   (Arrive by 6:45 AM)   Return Visit with VIKASH Eden [facility] Colon and Rectal Surgery ([facility] [place])    [address] [place] [address]  [address]th [facility] [address]   [phone]            May 24, 2018  8:30 AM CDT   (Arrive by 8:15 AM)   Return Visit with Sharon Rosado MD   [facility] and Infectious Diseases ([facility] [place])    [address] [place] [address]  [address]  [facility] [address] [phone]            May 30, 2018  1:30 PM CDT   RETURN RETINA with Daniella Shah MD   Eye Clinic ([facility])    [address]  9th [address] Clin 9a  [facility] [address]   [phone]            Jun 04, 2018 11:30 AM CDT   (Arrive by 11:15 AM)   RETURN DIABETES with Lenora Haley PA-C   [facility] Endocrinology ([facility] [place])    [address] [place] [address]  3rd Floor  [facility] [id number] [phone]            Jun 08, 2018 10:30 AM CDT   (Arrive by 10:15 AM)   RETURN HAND with Evin Zamorano MD   [facility] Orthopaedic Clinic ([facility] [place])    [address] [place] [address]  4th Floor  [facility] [id number] [phone]            Jun 13, 2018  8:00  AM CDT   (Arrive by 7:45 AM)   New Plastic Surgery with MAHENDRA Walker MD   [facility] Plastic and Reconstructive Surgery ([facility] [place])    [address]  4th Floor  [facility] [address] [phone]           Do not wear perfume.              Who to contact     Please call your clinic at [phone] to:    Ask questions about your health    Make or cancel appointments    Discuss your medicines    Learn about your test results    Speak to your doctor            Additional Information About Your Visit        [employer] Information     [employer] gives you secure access to your electronic health record. If you see a primary care provider, you can also send messages to your care team and make appointments. If you have questions, please call your primary care clinic.  If you do not have a primary care provider, please call [phone] and they will assist you.      [employer] is an electronic gateway that provides easy, online access to your medical records. With [employer], you can request a clinic appointment, read your test results, renew a prescription or communicate with your care team.     To access your existing account, please contact your [facility] or call [phone] for assistance.        Care EveryWhere ID     This is your Care EveryWhere ID. This could be used by other organizations to access your [facility] medical records  [id number]         Blood Pressure from Last 3 Encounters:   05/10/18 104/73   05/01/18 119/83   04/28/18 120/79    Weight from Last 3 Encounters:   05/10/18 80.3 kg (177 lb 1.6 oz)   05/01/18 78.2 kg (172 lb 4.8 oz)   04/28/18 78.8 kg (173 lb 12.8 oz)              Today, you had the following     No orders found for display       Primary Care Provider Office Phone # Fax #    Rubio Cronin [phone]       [facility] [address] [facility] [address]  [facility] [place] [address]        Equal Access to Services      RA JORDAN : Hadii aad fabián kat Cody, waaxda luqadaha, qaybta kaalmada berttamiko, dez merline haylinda malcolmclaudetteriky moreno . So [facility] [phone].    ATENCIÓN: Si habla español, tiene a kohli disposición servicios gratuitos de asistencia lingüística. Llame al [phone].    We comply with applicable federal civil rights laws and [place] laws. We do not discriminate on the basis of race, color, national origin, age, disability, sex, sexual orientation, or gender identity.            Thank you!     Thank you for choosing [facility] DI[facility]ETES  for your care. Our goal is always to provide you with excellent care. Hearing back from our patients is one way we can continue to improve our services. Please take a few minutes to complete the written survey that you may receive in the mail after your visit with us. Thank you!             Your Updated Medication List - Protect others around you: Learn how to safely use, store and throw away your medicines at www.disposemymeds.org.          This list is accurate as of 5/11/18 12:03 PM.  Always use your most recent med list.                   Brand Name Dispense Instructions for use Diagnosis    acetaminophen 325 MG tablet    TYLENOL    60 tablet    Take 1-2 tablets (325-650 mg) by mouth every 6 hours as needed for mild pain    Abdominal abscess (H)       azithromycin 250 MG tablet    ZITHROMAX Z-OLIVERIO    6 tablet    Two tablets on the first day, then one tablet daily for the next 4 days        blood glucose lancets standard    no brand specified    100 Box    Use to test blood sugar four times daily or as directed.    Type 2 diabetes mellitus without complication, without long-term current use of insulin (H)       blood glucose monitoring lancets     100 each    Use to test blood sugar 2 times daily or as directed.  Ok to substitute alternative if insurance prefers.    Type 2 diabetes mellitus with hyperglycemia, without long-term current use of insulin (H)       * blood  glucose monitoring meter device kit    no brand specified    1 kit    Use to test blood sugar four times daily or as directed.    Type 2 diabetes mellitus without complication, without long-term current use of insulin (H)       * blood glucose monitoring meter device kit    no brand specified    1 kit    Use to test blood sugar 4 times daily or as directed.    Type 2 diabetes mellitus with complication, without long-term current use of insulin (H)       * blood glucose monitoring test strip    no brand specified    100 each    Use to test blood sugars four times daily or as directed    Type 2 diabetes mellitus without complication, without long-term current use of insulin (H)       * blood glucose monitoring test strip    no brand specified    100 strip    1 strip by In Vitro route 4 times daily (before meals and nightly) Use to test blood sugars 4 times daily or as directed    Type 2 diabetes mellitus with complication, without long-term current use of insulin (H)       * blood glucose monitoring test strip    GELACIO CONTOUR NEXT    100 strip    Use to test blood sugar 2 times daily or as directed.  Ok to substitute alternative if insurance prefers.    Type 2 diabetes mellitus with hyperglycemia, without long-term current use of insulin (H)       Zzukcwg-Tiewu-Psmpcbyu-TenofAF [phone] MG Tabs per tablet    GENVOYA    30 tablet    Take 1 tablet by mouth daily (with breakfast)    AIDS (acquired immune deficiency syndrome) (H)       * insulin glargine 100 UNIT/ML injection    LANTUS SOLOSTAR    15 mL    Take 7 units each morning    Type 2 diabetes mellitus with hyperglycemia, without long-term current use of insulin (H)       * BASAGLAR 100 UNIT/ML injection     15 mL    Inject 7 units once daily subcutaneously    Type 2 diabetes mellitus (H)       * insulin pen needle 31G X 8 MM    B-D U/F    100 each    Use 1 pen needles daily or as directed.    Type 2 diabetes mellitus without complication, without long-term  current use of insulin (H)       * insulin pen needle 32G X 4 MM    BD SCOTT U/F    100 each    Use 1 daily as directed.    Type 2 diabetes mellitus with hyperglycemia, without long-term current use of insulin (H)       nicotine 21 MG/24HR 24 hr patch    NICODERM CQ    30 patch    Place 1 patch onto the skin every 24 hours    Cigarette nicotine dependence without complication       omeprazole 20 MG CR capsule    priLOSEC    90 capsule    TAKE ONE CAPSULE BY MOUTH EVERY DAY    Epigastric pain       * Notice:  This list has 9 medication(s) that are the same as other medications prescribed for you. Read the directions carefully, and ask your doctor or other care provider to review them with you.

## 2018-05-11 NOTE — PATIENT INSTRUCTIONS
1.  Increase Lantus to 14 units daily.  Take another 7 today.    2.  Check your blood sugar daily before food.    3.  Return May 15 at 2:30pm    Joanne Barrow RN,TERRENCEE  [address] [address]  Phone: [phone]  [email or web address]

## 2018-05-12 NOTE — DISCHARGE INSTRUCTIONS
"    Diabetes with High Blood Sugar  You have been treated for high blood sugar (hyperglycemia). This may be because of an infection or other illness, eating too many sweets or starches, or not taking enough insulin.  Home care  Monitor and write down your blood sugar level at least twice a day. Do this before breakfast and before dinner. If you take insulin, record your routine insulin dose as well. Also record any additional doses required based on your sliding scale. Do this for the next 3 to 5 days.  High blood sugar may cause symptoms that you can learn to recognize, such as:    Frequent urination    Thirst    Dizziness    Headache    Shortness of breath    Breath that smells fruity    Nausea or vomiting    Abdominal pain    Drowsiness or loss of consciousness  If you have high-blood-sugar symptoms, use a blood or urine test to find out what your blood sugar level is. If it is above your usual range, use the \"sliding scale\" regular insulin dose prescribed by your healthcare provider. If you were not given a range for your insulin dose, contact your healthcare provider for more advice.  Follow-up care  Follow up with your healthcare provider, or as advised. You may need to meet with your healthcare provider in the next week. You will likely review your blood sugar records together. You may also talk to your provider about adjusting your dose of insulin or other medicine for blood sugar.  When to seek medical advice  Call your healthcare provider right away if these occur:    High blood sugar symptoms (Symptoms are described above.)    Blood sugar over 300 mg/dl If you can t reach your healthcare provider, go to a hospital emergency room or urgent care center  Date Last Reviewed: 6/1/2016 2000-2017 [employer]. [address] [place] [address], [place] [address]. All rights reserved. This information is not intended as a substitute for professional medical care. Always follow your healthcare professional's " instructions.

## 2018-05-12 NOTE — ED PROVIDER NOTES
History     Chief Complaint   Patient presents with     Hyperglycemia     HPI  Andrew Lockwood is a 50 year old male with a history of HIV, insulin-dependent type 2 diabetes mellitus, CNS toxoplasmosis, who presents to the emergency department today for evaluation of hyperglycemia.  The patient states that he is prescribed Lantus and states that he is to take 7 units of this once per day in the morning.  The patient was notably seen in this ER last night when he found his blood sugars to be over 400 despite taking his prescribed insulin.  The patient followed up in the endocrinologist clinic today for reeducation on insulin use/diabetes.  He was advised to take another 7 units of insulin, and to continue to take 4 units per day from now on.  The patient states that he did indeed take another 7 units, though blood sugars remain elevated, so he took another 70 minutes just prior to arrival.  He presents now with intractably high blood sugars.  Blood sugar here is 268 triage.  The patient has no physical concerns or complaints at this time.    No current facility-administered medications for this encounter.      Current Outpatient Prescriptions   Medication     BASAGLAR 100 UNIT/ML injection     blood glucose (NO BRAND SPECIFIED) lancets standard     blood glucose monitoring (GELACIO CONTOUR NEXT) test strip     blood glucose monitoring (GELACIO MICROLET) lancets     blood glucose monitoring (NO BRAND SPECIFIED) meter device kit     blood glucose monitoring (NO BRAND SPECIFIED) meter device kit     blood glucose monitoring (NO BRAND SPECIFIED) test strip     blood glucose monitoring (NO BRAND SPECIFIED) test strip     empagliflozin (JARDIANCE) 10 MG TABS tablet     GENVOYA [id number] mg tablet     insulin glargine (LANTUS SOLOSTAR) 100 UNIT/ML pen     insulin pen needle (B-D U/F) 31G X 8 MM     insulin pen needle (BD SCOTT U/F) 32G X 4 MM     Past Medical History:   Diagnosis Date     AIDS (H)      Allergic rhinitis due  to other allergen     DNS     Chronic abdominal pain      CNS toxoplasmosis (H)      Diabetes type 2, controlled (H)      GERD (gastroesophageal reflux disease)      HIV (human immunodeficiency virus infection) (H)      Periungual wart      Sleep apnea     doesn't use CPAP       Past Surgical History:   Procedure Laterality Date     C NONSPECIFIC PROCEDURE      right forearm fracture     COLONOSCOPY Left 1/22/2016    Procedure: COMBINED COLONOSCOPY, SINGLE OR MULTIPLE BIOPSY/POLYPECTOMY BY BIOPSY;  Surgeon: Clark Saini MD;  Location: [facility]     HC EXPLORE UNDESC TESTIS,INGUIN/SCROTAL       LAPAROSCOPIC APPENDECTOMY N/A 1/31/2018    Procedure: LAPAROSCOPIC APPENDECTOMY;  LAPAROSCOPIC APPENDECTOMY;  Surgeon: Dawn Holt MD;  Location: [facility]     LAPAROSCOPY DIAGNOSTIC (GENERAL) N/A 7/26/2016    Procedure: LAPAROSCOPY DIAGNOSTIC (GENERAL);  Surgeon: Susannah Arriaga MD;  Location: [facility]     LAPAROSCOPY DIAGNOSTIC (GENERAL) N/A 4/16/2018    Procedure: LAPAROSCOPY DIAGNOSTIC (GENERAL);  Diagnostic laparoscopy and lysis of adhesions;  Surgeon: Prince Dowling MD;  Location: [facility]     OPTICAL TRACKING SYSTEM CRANIOTOMY, EXCISE TUMOR, COMBINED Left 4/10/2015    Procedure: COMBINED OPTICAL TRACKING SYSTEM CRANIOTOMY, EXCISE TUMOR;  Surgeon: Mirlande Colmenares MD;  Location: [facility]     REPAIR GAMEKEEPER'S THUMB Right 12/2/2016    Procedure: REPAIR LIGAMENT ULNAR COLLATERAL THUMB (GAMEKEEPER'S);  Surgeon: Evin Zamorano MD;  [facility]       Family History   Problem Relation Age of Onset     DIABETES Brother      DIABETES Father      Alzheimer Disease Father      Unknown/Adopted Mother      DIABETES Paternal Grandfather      CANCER No family hx of      no skin cancer     Skin Cancer No family hx of      no famiy hx of skin cancer     Glaucoma No family hx of      Macular Degeneration No family hx of        Social History   Substance Use Topics     Smoking status: Current Every Day  "Smoker     Packs/day: 0.20     Last attempt to quit: 10/28/2016     Smokeless tobacco: Former User      Comment: 4 cigarettes     Alcohol use No      Comment: Last etoh in 2007     Allergies   Allergen Reactions     Metformin      Abdominal pain     Dulaglutide Rash       I have reviewed the Medications, Allergies, Past Medical and Surgical History, and Social History in the Epic system.    Review of Systems   Constitutional: Negative for fever.   Respiratory: Negative for shortness of breath.    Cardiovascular: Negative for chest pain.   Gastrointestinal: Negative for abdominal pain.   All other systems reviewed and are negative.      Physical Exam   BP: 124/87  Pulse: 101  Temp: 98.2  F (36.8  C)  Resp: 14  Height: 162.6 cm (5' 4\")  Weight: 78 kg (172 lb)  SpO2: 97 %  Visual Acuity-Left: 20/40  Visual Acuity-Right: 20/70      Physical Exam    Exam:  Constitutional: healthy, alert and no distress  Head: Normocephalic. No masses, lesions, tenderness or abnormalities  Neck: Neck supple. No adenopathy. Thyroid symmetric, normal size,, Carotids without bruits.  ENT: ENT exam normal, no neck nodes or sinus tenderness  Cardiovascular: negative, PMI normal. No lifts, heaves, or thrills. RRR. No murmurs, clicks gallops or rub  Respiratory: negative, Percussion normal. Good diaphragmatic excursion. Lungs clear  Gastrointestinal: Abdomen soft, non-tender. BS normal. No masses, organomegaly  : Deferred  Musculoskeletal: extremities normal- no gross deformities noted, gait normal and normal muscle tone  Skin: no suspicious lesions or rashes  Neurologic: Gait normal. Reflexes normal and symmetric. Sensation grossly WNL.  Psychiatric: mentation appears normal and affect normal/bright  Hematologic/Lymphatic/Immunologic: Normal cervical lymph nodes      ED Course     ED Course     Procedures               Labs Ordered and Resulted from Time of ED Arrival Up to the Time of Departure from the ED   BASIC METABOLIC PANEL - Abnormal; " Notable for the following:        Result Value    Glucose 222 (*)     All other components within normal limits   GLUCOSE BY METER - Abnormal; Notable for the following:     Glucose 268 (*)     All other components within normal limits   GLUCOSE BY METER - Abnormal; Notable for the following:     Glucose 183 (*)     All other components within normal limits   CBC WITH PLATELETS DIFFERENTIAL   KETONE BETA-HYDROXYBUTYRATE QUANTITATIVE            Assessments & Plan (with Medical Decision Making)     RONAL   is 50-year-old male who is insulin-dependent diabetic and was seen at the clinic today secondary to hypoglycemia and was referred here for further evaluation when his blood glucose was noted to be consistently in the 3-400s.  It appears that patient is supposed to be on 7 units of insulin once a day but also noted that his insulin, Lantus was increased to 14 units.  Patient states that his physician had tried to control the sugar in the clinic but when told that his sugar was still in the 3-400, he was referred here.  Patient states that he is been feeling well without discomfort over the last few days.  Patient states that he also has not eaten anything today.  He actually has no complaints except for being referred here for elevated glucose.  Physical exam is unremarkable no evidence of any acute findings.  Given his history of insulin dependent diabetes as well as current presentation, and concern for possible DKA versus hyperglycemia versus occult infection.  Will evaluate with further blood work and give him some IV fluids.    Pt feeling better and no evidence of DKA or other infection. Will d/c home with f/u with PCP.    I have reviewed the nursing notes.    I have reviewed the findings, diagnosis, plan and need for follow up with the patient.    Discharge Medication List as of 5/11/2018 10:59 PM          Final diagnoses:   Hyperglycemia     I, Miky Lima, henry serving as a trained medical scribe to document  services personally performed by Anthony Walters MD, based on the provider's statements to me.      I, Anthony Walters MD, was physically present and have reviewed and verified the accuracy of this note documented by Miky Lima.    5/11/2018   [facility], [place], EMERGENCY DEPARTMENT     Anthony Walters MD  06/16/18 1944

## 2018-05-16 NOTE — ED PROVIDER NOTES
History     Chief Complaint   Patient presents with     Abdominal Pain     HPI  Andrew Lockwood is a 50 year old male with a history of HIV and insulin-dependent diabetes, CNS toxoplasmosis who presents to emergency department with productive cough. He is currently an active smoker and smokes 1 pack per day.  He is unaware of any sick contacts.  He states that for the last 2 days he has had a productive cough but denies any shortness of breath or fever.  He also reports some abdominal pain that is worse with his cough.  Bowel movements have been normal.  His blood sugars have been elevated above 300 overnight.  He is prescribed Lantus and states he has been compliant with this.  His last CD4 count was 299 in April of 2018.  He is compliant with his antiretroviral therapy.        PAST MEDICAL HISTORY:   Past Medical History:   Diagnosis Date     AIDS (H)      Allergic rhinitis due to other allergen     DNS     Chronic abdominal pain      CNS toxoplasmosis (H)      Diabetes type 2, controlled (H)      GERD (gastroesophageal reflux disease)      HIV (human immunodeficiency virus infection) (H)      Periungual wart      Sleep apnea     doesn't use CPAP       PAST SURGICAL HISTORY:   Past Surgical History:   Procedure Laterality Date     C NONSPECIFIC PROCEDURE      right forearm fracture     COLONOSCOPY Left 1/22/2016    Procedure: COMBINED COLONOSCOPY, SINGLE OR MULTIPLE BIOPSY/POLYPECTOMY BY BIOPSY;  Surgeon: Clark Saini MD;  Location: [facility]     HC EXPLORE UNDESC TESTIS,INGUIN/SCROTAL       LAPAROSCOPIC APPENDECTOMY N/A 1/31/2018    Procedure: LAPAROSCOPIC APPENDECTOMY;  LAPAROSCOPIC APPENDECTOMY;  Surgeon: Dawn Holt MD;  [facility]     LAPAROSCOPY DIAGNOSTIC (GENERAL) N/A 7/26/2016    Procedure: LAPAROSCOPY DIAGNOSTIC (GENERAL);  Surgeon: Susannah Arriaga MD;  [facility]     LAPAROSCOPY DIAGNOSTIC (GENERAL) N/A 4/16/2018    Procedure: LAPAROSCOPY DIAGNOSTIC (GENERAL);   Diagnostic laparoscopy and lysis of adhesions;  Surgeon: Prince Dowling MD;  [facility]     OPTICAL TRACKING SYSTEM CRANIOTOMY, EXCISE TUMOR, COMBINED Left 4/10/2015    Procedure: COMBINED OPTICAL TRACKING SYSTEM CRANIOTOMY, EXCISE TUMOR;  Surgeon: Mirlande Colmenares MD;  [facility]     REPAIR GAMEKEEPER'S THUMB Right 12/2/2016    Procedure: REPAIR LIGAMENT ULNAR COLLATERAL THUMB (GAMEKEEPER'S);  Surgeon: Evin Zamorano MD;  [facility]       FAMILY HISTORY:   Family History   Problem Relation Age of Onset     DIABETES Brother      DIABETES Father      Alzheimer Disease Father      Unknown/Adopted Mother      DIABETES Paternal Grandfather      CANCER No family hx of      no skin cancer     Skin Cancer No family hx of      no famiy hx of skin cancer     Glaucoma No family hx of      Macular Degeneration No family hx of        SOCIAL HISTORY:   Social History   Substance Use Topics     Smoking status: Current Every Day Smoker     Packs/day: 0.20     Last attempt to quit: 10/28/2016     Smokeless tobacco: Former User      Comment: 4 cigarettes     Alcohol use No      Comment: Last etoh in 2007       Discharge Medication List as of 5/10/2018  4:58 AM      START taking these medications    Details   azithromycin (ZITHROMAX Z-OLIVERIO) 250 MG tablet Two tablets on the first day, then one tablet daily for the next 4 days, Disp-6 tablet, R-0, Local Print         CONTINUE these medications which have NOT CHANGED    Details   acetaminophen (TYLENOL) 325 MG tablet Take 1-2 tablets (325-650 mg) by mouth every 6 hours as needed for mild pain, Disp-60 tablet, R-0, E-Prescribe      !! BASAGLAR 100 UNIT/ML injection Inject 7 units once daily subcutaneously, Disp-15 mL, R-3, E-Prescribe      blood glucose (NO BRAND SPECIFIED) lancets standard Use to test blood sugar four times daily or as directed.Disp-100 Box, R-5Local Print      !! blood glucose monitoring (GELACIO CONTOUR NEXT) test strip Use to test blood  sugar 2 times daily or as directed.  Ok to substitute alternative if insurance prefers., Disp-100 strip, R-11, E-Prescribe      blood glucose monitoring (GELACIO MICROLET) lancets Use to test blood sugar 2 times daily or as directed.  Ok to substitute alternative if insurance prefers., Disp-100 each, R-11, E-Prescribe      !! blood glucose monitoring (NO BRAND SPECIFIED) meter device kit Use to test blood sugar four times daily or as directed.Disp-1 kit, R-0Local Print      !! blood glucose monitoring (NO BRAND SPECIFIED) meter device kit Use to test blood sugar 4 times daily or as directed.Disp-1 kit, [id number]      !! blood glucose monitoring (NO BRAND SPECIFIED) test strip Use to test blood sugars four times daily or as directed, Disp-100 each, R-5, Local Print      !! blood glucose monitoring (NO BRAND SPECIFIED) test strip 1 strip by In Vitro route 4 times daily (before meals and nightly) Use to test blood sugars 4 times daily or as directed, Disp-100 strip, R-11, E-Prescribe      GENVOYA [id number] mg tablet Take 1 tablet by mouth daily (with breakfast), Disp-30 tablet, R-0, E-Prescribe      !! insulin pen needle (B-D U/F) 31G X 8 MM Use 1 pen needles daily or as directed.Disp-100 each, [id number]      !! insulin pen needle (BD SCOTT U/F) 32G X 4 MM Use 1 daily as directed.Disp-100 each, [id number]      nicotine (NICODERM CQ) 21 MG/24HR 24 hr patch Place 1 patch onto the skin every 24 hours, Disp-30 patch, R-1, E-PrescribeUse for up to 2 months, will then need to reduce dose, f/u with PCP or ID provider      omeprazole (PRILOSEC) 20 MG CR capsule TAKE ONE CAPSULE BY MOUTH EVERY DAY, Disp-90 capsule, R-1, E-Prescribe      !! insulin glargine (LANTUS SOLOSTAR) 100 UNIT/ML injection Take 7 units each morning, Disp-15 mL, R-3, E-Prescribe       !! - Potential duplicate medications found. Please discuss with provider.             Allergies   Allergen Reactions     Metformin      Abdominal pain      Dulaglutide Rash       I have reviewed the Medications, Allergies, Past Medical and Surgical History, and Social History in the Epic system.    Review of Systems   All other systems reviewed and are negative.      Physical Exam   BP: 122/88  Pulse: 112  Temp: 98.8  F (37.1  C)  Weight: 80.3 kg (177 lb 1.6 oz)  SpO2: 98 %      Physical Exam   Constitutional: He is oriented to person, place, and time. No distress.   HENT:   Head: Normocephalic and atraumatic.   Right Ear: External ear normal.   Left Ear: External ear normal.   Mouth/Throat: Oropharynx is clear and moist. No oropharyngeal exudate.   Eyes: Conjunctivae are normal. Pupils are equal, round, and reactive to light.   Neck: Normal range of motion. Neck supple.   Cardiovascular: Normal heart sounds and intact distal pulses.    Pulmonary/Chest: Effort normal. No respiratory distress. He has no wheezes. He has no rales. He exhibits no tenderness.   Abdominal: Soft. There is no tenderness. There is no rebound and no guarding.   Musculoskeletal: Normal range of motion. He exhibits no edema or tenderness.   Neurological: He is alert and oriented to person, place, and time. He exhibits normal muscle tone.   Skin: Skin is warm and dry. No rash noted. He is not diaphoretic.   Psychiatric: He has a normal mood and affect. His behavior is normal. Thought content normal.   Nursing note and vitals reviewed.      ED Course     ED Course     Procedures             Critical Care time:  none             Labs Ordered and Resulted from Time of ED Arrival Up to the Time of Departure from the ED   GLUCOSE BY METER - Abnormal; Notable for the following:        Result Value    Glucose 442 (*)     All other components within normal limits   CBC WITH PLATELETS DIFFERENTIAL - Abnormal; Notable for the following:     RBC Count 4.20 (*)     Hemoglobin 13.1 (*)     Hematocrit 38.4 (*)     All other components within normal limits   BASIC METABOLIC PANEL - Abnormal; Notable for the  following:     Glucose 371 (*)     All other components within normal limits   GLUCOSE BY METER - Abnormal; Notable for the following:     Glucose 368 (*)     All other components within normal limits            Assessments & Plan (with Medical Decision Making)   1.  Lower respiratory infection     50-year-old male with history of HIV and insulin-dependent diabetes who presents with productive cough.  On examination he is in no respiratory distress and vital signs on my examination are normal.  Chest x-ray with no acute infiltrate.  Initial blood glucose was 442.  He was treated with normal saline bolus along with Insulin 4 units IV.  No evidence of DKA.  Given his immunocompromised state, will treat with Azithromycin. He should follow up with his primary doctor regarding his elevated blood sugars and to check his progress with his respiratory infection.     I have reviewed the nursing notes.    I have reviewed the findings, diagnosis, plan and need for follow up with the patient.    Discharge Medication List as of 5/10/2018  4:58 AM      START taking these medications    Details   azithromycin (ZITHROMAX Z-OLIVERIO) 250 MG tablet Two tablets on the first day, then one tablet daily for the next 4 days, Disp-6 tablet, R-0, Local Print             Final diagnoses:   Acute bronchitis, unspecified organism       5/10/2018   [facility], [place], EMERGENCY DEPARTMENT     Angelo Estes MD  05/16/18 0749

## 2018-05-23 ENCOUNTER — OFFICE VISIT (OUTPATIENT)
Dept: EDUCATION SERVICES | Facility: CLINIC | Age: 53
End: 2018-05-23
Payer: COMMERCIAL

## 2018-05-23 DIAGNOSIS — E11.65 TYPE 2 DIABETES MELLITUS WITH HYPERGLYCEMIA, WITHOUT LONG-TERM CURRENT USE OF INSULIN (H): Primary | ICD-10-CM

## 2018-05-23 NOTE — PATIENT INSTRUCTIONS
1.  Test your blood sugar 2 times per day or if you have symptoms of low blood sugar.  Your first blood sugar goal is to get it under 200.    2.  Take your Lantus 24 units in the morning and 8 units in the evening.  Try to get the doses about 12 hours apart    3.  Carry glucose tablets with you.  Remember it would take 3-4 tablets to treat a low.      4.  Return in 1 week with your meter.    Joanne Barrow RN,CDE  [address] [address]  Phone: [phone]  [email or web address]

## 2018-05-23 NOTE — PROGRESS NOTES
DIABETES EDUCATION NOTE:    Andrew Lockwood presents today for education related to Type 2 diabetes    Patient is being treated with:  diet, exercise and insulin  He is accompanied by self    PATIENT CONCERNS RELATED TO DIABETES SELF MANAGEMENT:   Feels hyper after taking insulin, he has increased his insulin and his blood sugar is still high  He reports a 500 yesterday and is 404 in the office today     Current Diabetes Management per Patient:  Taking diabetes medications? He states he has been taking daily and has increased to 24 units every morning    Monitoring  Patient glucose self monitoring as follows: its unclear, has no meter with him today.   BG meter: Contour Next EZ meter  BG results: not available     BG values are: Not in goal  Patient's most recent   Lab Results   Component Value Date    A1C 6.0 08/11/2017    is meeting goal of <7.0      Exercise: sporadic or irregular exercise, walking    Nutrition:   Patient currently eats 1 meals 1-2 snacks per day    Hypoglycemia:   Patient is at risk of hypoglycemia? Yes, carries glucose tablets with him                             EDUCATION and INSTRUCTION PROVIDED AT THIS VISIT:    Andrew comes in due to hyperglycemia.  He states he has been increasing his insulin and his blood sugars are stuck in the 400-500 range.  He eats one meal per day and snacks when there is food available.  He states he ate breakfast this morning esau crackers and water and has not eaten since.  He took his insulin at 9am and his blood sugar in the office is now 404.  He took an additional 8 units right now.  We will increase the Basaglar.  He would like to split the dose into two injections due to feeling hyper after he takes it.    He is strongly encouraged to follow up in the clinic in one week or sooner if he feel the dose needs adjusting    Patient-stated goal written and given to Andrew Lockwood.  Verbalized and demonstrated understanding of instructions.     PLAN:  See patient  instructions  AVS printed and given to patient    FOLLOW-UP:    1 week    Time spent with patient at today's visit was 30 minutes.      Any diabetes medication dose changes were made via the CDE Protocol and Collaborative Practice Agreement with Juan and  Mata.  A copy of this encounter was provided to patient's referring provider.

## 2018-05-23 NOTE — MR AVS SNAPSHOT
After Visit Summary   5/23/2018    Andrew Lockwood    MRN: [id number]           Patient Information     Date Of Birth          11/27/1967        Visit Information        Provider Department      5/23/2018 3:30 PM Joanne Barrow RN [facility] Diabetes        Care Instructions    1.  Test your blood sugar 2 times per day.  Your first blood sugar goal is to get it under 200.    2.  Take your Lantus 24 units in the morning and 10 units in the evening.    3.  Carry glucose tablets with you.  Remember it would take 3-4 tablets to treat a low.      4.  Return in 1 week with your meter.    Joanne Barrow RN,CDE  [facility]  [address] [place] [address]  Phone: [phone]  [email or web address]              Follow-ups after your visit        Your next 10 appointments already scheduled     May 24, 2018  7:00 AM CDT   (Arrive by 6:45 AM)   Return Visit with VIKASH Eden Pending sale to [facility] ([facility])    [address] [place] [address]  4th [facility] [address]   [phone]            May 24, 2018  8:30 AM CDT   (Arrive by 8:15 AM)   Return Visit with Sharon Rosado MD   [facility] Infectious Diseases ([facility])    [address] [place] [address]  [address]  [facility] [id number] [phone]            May 30, 2018  1:30 PM CDT   RETURN RETINA with Daniella Shah MD   Eye Clinic ([facility])    [address]  [facility] [address]   [phone]            Jun 04, 2018 11:30 AM CDT   (Arrive by 11:15 AM)   RETURN DIABETES with Lenora Haley PA-C   [facility] Endocrinology ([facility])    [address] [place] [address]  3rd [facility] [id number] [phone]            Jun 08, 2018 10:30 AM CDT   (Arrive by 10:15 AM)   RETURN HAND with Evin Zamorano MD   [facility]  Orthopaedic Clinic ([facility] [place])    [address] [id number] [phone]            Jun 13, 2018  8:00 AM CDT   (Arrive by 7:45 AM)   New Plastic Surgery with MAHENDRA Walker MD   [facility] Plastic and Reconstructive Surgery ([facility])    [address] [address] [phone]           Do not wear perfume.              Who to contact     Please call your clinic at [phone] to:    Ask questions about your health    Make or cancel appointments    Discuss your medicines    Learn about your test results    Speak to your doctor            Additional Information About Your Visit        iStoryTime Information     iStoryTime gives you secure access to your electronic health record. If you see a primary care provider, you can also send messages to your care team and make appointments. If you have questions, please call your primary care clinic.  If you do not have a primary care provider, please call [phone] and they will assist you.      iStoryTime is an electronic gateway that provides easy, online access to your medical records. With iStoryTime, you can request a clinic appointment, read your test results, renew a prescription or communicate with your care team.     To access your existing account, please contact your [facility] or call [phone] for assistance.        Care EveryWhere ID     This is your Care EveryWhere ID. This could be used by other organizations to access your [place] medical records  [id number]         Blood Pressure from Last 3 Encounters:   05/11/18 120/89   05/10/18 104/73   05/01/18 119/83    Weight from Last 3 Encounters:   05/11/18 78 kg (172 lb)   05/10/18 80.3 kg (177 lb 1.6 oz)   05/01/18 78.2 kg (172 lb 4.8 oz)              Today, you had the following     No orders found for display       Primary Care Provider Office Phone # Fax #     Rubio Cronin [phone]       [address]  [facility] [address]        Equal Access to Services     RA JORDAN : Hadii aad ku hadsebastianjuan luis Cody, warenettada jjмарияha, qaemeritata kaalmada axel, dez ernestoin hayaatenzin noelecho alfaro josh salazar. So [facility] [phone].    ATENCIÓN: Si habla español, tiene a kohli disposición servicios gratuitos de asistencia lingüística. Llame al [phone].    We comply with applicable federal civil rights laws and [place] laws. We do not discriminate on the basis of race, color, national origin, age, disability, sex, sexual orientation, or gender identity.            Thank you!     Thank you for choosing [facility] DIABETES  for your care. Our goal is always to provide you with excellent care. Hearing back from our patients is one way we can continue to improve our services. Please take a few minutes to complete the written survey that you may receive in the mail after your visit with us. Thank you!             Your Updated Medication List - Protect others around you: Learn how to safely use, store and throw away your medicines at www.disposemymeds.org.          This list is accurate as of 5/23/18  3:43 PM.  Always use your most recent med list.                   Brand Name Dispense Instructions for use Diagnosis    acetaminophen 325 MG tablet    TYLENOL    60 tablet    Take 1-2 tablets (325-650 mg) by mouth every 6 hours as needed for mild pain    Abdominal abscess (H)       * BASAGLAR 100 UNIT/ML injection     15 mL    Inject 7 units once daily subcutaneously    Type 2 diabetes mellitus (H)       * LANTUS SOLOSTAR 100 UNIT/ML injection   Generic drug:  insulin glargine     15 mL    Take 14 units each morning    Type 2 diabetes mellitus with hyperglycemia, without long-term current use of insulin (H)       blood glucose lancets standard    no brand specified    100 Box    Use to test blood sugar four times daily or as directed.    Type 2  diabetes mellitus without complication, without long-term current use of insulin (H)       blood glucose monitoring lancets     100 each    Use to test blood sugar 2 times daily or as directed.  Ok to substitute alternative if insurance prefers.    Type 2 diabetes mellitus with hyperglycemia, without long-term current use of insulin (H)       * blood glucose monitoring meter device kit    no brand specified    1 kit    Use to test blood sugar four times daily or as directed.    Type 2 diabetes mellitus without complication, without long-term current use of insulin (H)       * blood glucose monitoring meter device kit    no brand specified    1 kit    Use to test blood sugar 4 times daily or as directed.    Type 2 diabetes mellitus with complication, without long-term current use of insulin (H)       * blood glucose monitoring test strip    no brand specified    100 each    Use to test blood sugars four times daily or as directed    Type 2 diabetes mellitus without complication, without long-term current use of insulin (H)       * blood glucose monitoring test strip    no brand specified    100 strip    1 strip by In Vitro route 4 times daily (before meals and nightly) Use to test blood sugars 4 times daily or as directed    Type 2 diabetes mellitus with complication, without long-term current use of insulin (H)       * blood glucose monitoring test strip    GELACIO CONTOUR NEXT    100 strip    Use to test blood sugar 2 times daily or as directed.  Ok to substitute alternative if insurance prefers.    Type 2 diabetes mellitus with hyperglycemia, without long-term current use of insulin (H)       Azrxhmi-Nxkzj-Iijchefd-TenofAF [phone] MG Tabs per tablet    GENVOYA    30 tablet    Take 1 tablet by mouth daily (with breakfast)    AIDS (acquired immune deficiency syndrome) (H)       * insulin pen needle 31G X 8 MM    B-D U/F    100 each    Use 1 pen needles daily or as directed.    Type 2 diabetes mellitus without  complication, without long-term current use of insulin (H)       * insulin pen needle 32G X 4 MM    BD SCOTT U/F    100 each    Use 1 daily as directed.    Type 2 diabetes mellitus with hyperglycemia, without long-term current use of insulin (H)       nicotine 21 MG/24HR 24 hr patch    NICODERM CQ    30 patch    Place 1 patch onto the skin every 24 hours    Cigarette nicotine dependence without complication       omeprazole 20 MG CR capsule    priLOSEC    90 capsule    TAKE ONE CAPSULE BY MOUTH EVERY DAY    Epigastric pain       * Notice:  This list has 9 medication(s) that are the same as other medications prescribed for you. Read the directions carefully, and ask your doctor or other care provider to review them with you.

## 2018-05-30 ENCOUNTER — OFFICE VISIT (OUTPATIENT)
Dept: OPHTHALMOLOGY | Facility: CLINIC | Age: 53
End: 2018-05-30
Attending: OPHTHALMOLOGY
Payer: COMMERCIAL

## 2018-05-30 DIAGNOSIS — H33.312 HORSESHOE TEAR OF RETINA OF LEFT EYE WITHOUT DETACHMENT: ICD-10-CM

## 2018-05-30 DIAGNOSIS — Z79.4 TYPE 2 DIABETES MELLITUS WITH COMPLICATION, WITH LONG-TERM CURRENT USE OF INSULIN (H): Primary | ICD-10-CM

## 2018-05-30 DIAGNOSIS — H53.8 BLURRED VISION: ICD-10-CM

## 2018-05-30 DIAGNOSIS — E11.8 TYPE 2 DIABETES MELLITUS WITH COMPLICATION, WITH LONG-TERM CURRENT USE OF INSULIN (H): Primary | ICD-10-CM

## 2018-05-30 PROCEDURE — G0463 HOSPITAL OUTPT CLINIC VISIT: HCPCS | Mod: ZF

## 2018-05-30 PROCEDURE — 92015 DETERMINE REFRACTIVE STATE: CPT | Mod: ZF

## 2018-05-30 ASSESSMENT — REFRACTION_MANIFEST
OD_CYLINDER: +0.25
OD_SPHERE: PLANO
OD_ADD: +3.00
OS_ADD: +3.00
OS_SPHERE: +0.75
OS_CYLINDER: SPHERE
OD_AXIS: 150

## 2018-05-30 ASSESSMENT — VISUAL ACUITY
CORRECTION_TYPE: GLASSES
OS_CC: J2
OD_CC: J2
OS_CC+: -2
OD_CC+: -2
OD_CC: 20/25
METHOD: SNELLEN - LINEAR
OS_CC: 20/40

## 2018-05-30 ASSESSMENT — SLIT LAMP EXAM - LIDS
COMMENTS: NORMAL
COMMENTS: NORMAL

## 2018-05-30 ASSESSMENT — REFRACTION_WEARINGRX
SPECS_TYPE: OTC READERS
OD_CYLINDER: SPHERE
OS_CYLINDER: SPHERE
OD_SPHERE: +2.50
OS_SPHERE: +2.50

## 2018-05-30 ASSESSMENT — EXTERNAL EXAM - RIGHT EYE: OD_EXAM: NORMAL

## 2018-05-30 ASSESSMENT — TONOMETRY
OS_IOP_MMHG: 17
IOP_METHOD: ICARE
OD_IOP_MMHG: 15

## 2018-05-30 ASSESSMENT — CUP TO DISC RATIO
OS_RATIO: 0.3
OD_RATIO: 0.3

## 2018-05-30 ASSESSMENT — EXTERNAL EXAM - LEFT EYE: OS_EXAM: NORMAL

## 2018-05-30 NOTE — PROGRESS NOTES
Interval history: Here for f/u laser retinopexy OS. Doing well, no eye pain, no flashes, no new floaters. Patient has been homeless for past 4mo. Continues to follow with ID and take HIV meds- RNA undetectable and CD4 299 4/26/18. DM2 diagnosed 2013, on insulin, most recent A1c 7.5 (2/2018).    HPI  Patient is here for his 1 year fundus evaluation.  patient states has been having blurred visual acuity and difficulty driving . No flashing.  Andrew Lockwood is a 50 year old male with h/o HIV/AIDS, with cerebral toxoplasmosis.    POH: Presbyopia, amblyopia OS  PMH: HIV with CMV  FH: No FH of AMD or glaucoma  SH: Non-smoker    RETINAL IMAGING  Optical Coherence Tomography 3-15-18  Right eye:  Broadened foveal contour, no IRF/SRF  Left eye: Broadened foveal contour, no IRF/SRF. Vitreous debris; tiny subfoveal disruption of the IS/OS junction     Autofluorescence 6-1-16  Right eye: minimal foveal hypoAF  Left eye: minimal foveal hypoautofluorescence    PHOTOS 3-15-18  Right eye: Normal  Left eye: Pigmentation line temporally; nasal Horseshoe tear        Assessment & Plan    1. 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 DM2 with no retinopathy  -Diagnosed 2013, on insulin, most recent A1c 7.5 (2/2018)  -BP/BG control    3. HIV with CMV  Patient does not display signs of CMV retinitis.  -Recommend yearly eye exams to monitor, sooner as needed   -RNA undetectable and CD4 299 4/26/18    4. H/o amblyopia left eye  Patient does not report strabismus but his left eye has always been weaker than the right. BCVA of 20/40 today OS with normal exam.  -Monitor.    5. Presbyopia, both eyes  OK to continue with OTC readers.    Return to clinic 1 year    Sushma Santos MD   Ophthalmology PGY-3      ~~~~~~~~~~~~~~~~~~~~~~~~~~~~~~~~~~   Complete documentation of  historical and exam elements from today's encounter can be found in the full encounter summary report (not reduplicated in this progress note).  I personally obtained the chief complaint(s) and history of present illness.  I confirmed and edited as necessary the review of systems, past medical/surgical history, family history, social history, and examination findings as documented by others; and I examined the patient myself.  I personally reviewed the relevant tests, images, and reports as documented above.  I formulated and edited as necessary the assessment and plan and discussed the findings and management plan with the patient and family    Daniella Shah MD  .  Retina Service   Department of Ophthalmology and Visual Neurosciences   [facility]  Phone: [phone]   Fax: [phone]

## 2018-05-30 NOTE — NURSING NOTE
Chief Complaints and History of Present Illnesses   Patient presents with     Diabetic Eye Exam     HPI    Additional Referring Providers:  Internal medicine Russ Ponce MD   Symptoms:     Blurred vision   Decreased vision   Difficulty with driving   Floaters   Glare   Halos            Comments:  DM annual exam; DFE + MRx    Wears otc readers +2.50 for [facility]. Sees well in the [facility] [place] but has a hard time driving at night or seeing the road signs if it is raining. Would like an update pair of gls Rx.    Floaters OU, longstanding and unchanging.    DM type II (Internal medicine Russ Ponce MD)  BS, yesterday 270.   Lab Results       Component                Value               Date                       A1C                      6.0                 08/11/2017                 A1C                      6.2                 05/23/2017                 A1C                      6.4                 01/23/2017                 A1C                      7.0                 08/18/2016                 A1C                      8.3                 06/16/2016

## 2018-05-30 NOTE — MR AVS SNAPSHOT
After Visit Summary   5/30/2018    Andrew Lockwood    MRN: [id number]           Patient Information     Date Of Birth          11/27/1967        Visit Information        Provider Department      5/30/2018 1:30 PM Daniella Shah MD Eye Clinic        Today's Diagnoses     Type 2 diabetes mellitus with complication, with long-term current use of insulin (H)    -  1    Blurred vision        Horseshoe tear of retina of left eye without detachment           Follow-ups after your visit        Follow-up notes from your care team     Return in about 1 year (around 5/30/2019) for f/u HST OS and DM exam.      Your next 10 appointments already scheduled     May 31, 2018  1:00 PM CDT   (Arrive by 12:45 PM)   Office Visit with Joanne Barrow RN   [facility] Diabetes ([facility] [place])    [address] [place] [id number] [phone]           Bring a current list of meds and any records pertaining to this visit. For Physicals, please bring immunization records and any forms needing to be filled out. Please arrive 10 minutes early to complete paperwork.            Jun 04, 2018 11:30 AM CDT   (Arrive by 11:15 AM)   RETURN DIABETES with Lenora Haley PA-C   [facility] Endocrinology ([facility] [place])    [address] [place] [id number] [phone]            Jun 08, 2018 10:30 AM CDT   (Arrive by 10:15 AM)   RETURN HAND with Evin Zamorano MD   [facility] Orthopaedic Clinic ([facility] [place])    [address] [place] [id number] [phone]            Jun 13, 2018  8:00 AM CDT   (Arrive by 7:45 AM)   New Plastic Surgery with MAHENDRA Walker MD   [facility] Plastic and Reconstructive Surgery ([facility] [place])    [address] [place] [id number] [phone]           Do not wear perfume.            Jun 22, 2018 10:15  AM CDT   (Arrive by 10:00 AM)   Return Visit with VIKASH Eden [facility]al [facility] ([facility])    [address]  [address] [facility] [address] [phone]              Who to contact     Please call your clinic at [phone] to:    Ask questions about your health    Make or cancel appointments    Discuss your medicines    Learn about your test results    Speak to your doctor            Additional Information About Your Visit        PlayOn! SportsharBambuser Information     Vigo gives you secure access to your electronic health record. If you see a primary care provider, you can also send messages to your care team and make appointments. If you have questions, please call your primary care clinic.  If you do not have a primary care provider, please call [phone] and they will assist you.      Vigo is an electronic gateway that provides easy, online access to your medical records. With Vigo, you can request a clinic appointment, read your test results, renew a prescription or communicate with your care team.     To access your existing account, please contact your [facility] or call [phone] for assistance.        Care EveryWhere ID     This is your Care EveryWhere ID. This could be used by other organizations to access your [place] medical records  [id number]         Blood Pressure from Last 3 Encounters:   05/11/18 120/89   05/10/18 104/73   05/01/18 119/83    Weight from Last 3 Encounters:   05/11/18 78 kg (172 lb)   05/10/18 80.3 kg (177 lb 1.6 oz)   05/01/18 78.2 kg (172 lb 4.8 oz)              Today, you had the following     No orders found for display       Primary Care Provider Office Phone # Fax #    Rubio Cronin [phone]       [address]  [facility] [address]        Equal Access to Services     RA JORDAN AH: Apollo stephens  Facundoali, sharonda luqadaha, qaemeritata kashweta reyna, dez crow yuntobi ordoñeztenzin martin. So [facility] [phone].    ATENCIÓN: Si mac piedra, tiene a kohli disposición servicios gratuitos de asistencia lingüística. Gurdeep al [phone].    We comply with applicable federal civil rights laws and [place] laws. We do not discriminate on the basis of race, color, national origin, age, disability, sex, sexual orientation, or gender identity.            Thank you!     Thank you for choosing EYE CLINIC  for your care. Our goal is always to provide you with excellent care. Hearing back from our patients is one way we can continue to improve our services. Please take a few minutes to complete the written survey that you may receive in the mail after your visit with us. Thank you!             Your Updated Medication List - Protect others around you: Learn how to safely use, store and throw away your medicines at www.disposemymeds.org.          This list is accurate as of 5/30/18  3:37 PM.  Always use your most recent med list.                   Brand Name Dispense Instructions for use Diagnosis    acetaminophen 325 MG tablet    TYLENOL    60 tablet    Take 1-2 tablets (325-650 mg) by mouth every 6 hours as needed for mild pain    Abdominal abscess (H)       * BASAGLAR 100 UNIT/ML injection     15 mL    Inject 7 units once daily subcutaneously    Type 2 diabetes mellitus (H)       * LANTUS SOLOSTAR 100 UNIT/ML injection   Generic drug:  insulin glargine     15 mL    Take 14 units each morning    Type 2 diabetes mellitus with hyperglycemia, without long-term current use of insulin (H)       blood glucose lancets standard    no brand specified    100 Box    Use to test blood sugar four times daily or as directed.    Type 2 diabetes mellitus without complication, without long-term current use of insulin (H)       blood glucose monitoring lancets     100 each    Use to test blood sugar 2 times daily or as directed.  Ok to  substitute alternative if insurance prefers.    Type 2 diabetes mellitus with hyperglycemia, without long-term current use of insulin (H)       * blood glucose monitoring meter device kit    no brand specified    1 kit    Use to test blood sugar four times daily or as directed.    Type 2 diabetes mellitus without complication, without long-term current use of insulin (H)       * blood glucose monitoring meter device kit    no brand specified    1 kit    Use to test blood sugar 4 times daily or as directed.    Type 2 diabetes mellitus with complication, without long-term current use of insulin (H)       * blood glucose monitoring test strip    no brand specified    100 each    Use to test blood sugars four times daily or as directed    Type 2 diabetes mellitus without complication, without long-term current use of insulin (H)       * blood glucose monitoring test strip    no brand specified    100 strip    1 strip by In Vitro route 4 times daily (before meals and nightly) Use to test blood sugars 4 times daily or as directed    Type 2 diabetes mellitus with complication, without long-term current use of insulin (H)       * blood glucose monitoring test strip    GELACIO CONTOUR NEXT    100 strip    Use to test blood sugar 2 times daily or as directed.  Ok to substitute alternative if insurance prefers.    Type 2 diabetes mellitus with hyperglycemia, without long-term current use of insulin (H)       Bqwkzzj-Ebpes-Zvgciesy-TenofAF [phone] MG Tabs per tablet    GENVOYA    30 tablet    Take 1 tablet by mouth daily (with breakfast)    AIDS (acquired immune deficiency syndrome) (H)       * insulin pen needle 31G X 8 MM    B-D U/F    100 each    Use 1 pen needles daily or as directed.    Type 2 diabetes mellitus without complication, without long-term current use of insulin (H)       * insulin pen needle 32G X 4 MM    BD SCOTT U/F    100 each    Use 1 daily as directed.    Type 2 diabetes mellitus with hyperglycemia,  without long-term current use of insulin (H)       nicotine 21 MG/24HR 24 hr patch    NICODERM CQ    30 patch    Place 1 patch onto the skin every 24 hours    Cigarette nicotine dependence without complication       omeprazole 20 MG CR capsule    priLOSEC    90 capsule    TAKE ONE CAPSULE BY MOUTH EVERY DAY    Epigastric pain       * Notice:  This list has 9 medication(s) that are the same as other medications prescribed for you. Read the directions carefully, and ask your doctor or other care provider to review them with you.

## 2018-06-04 ENCOUNTER — HOSPITAL ENCOUNTER (EMERGENCY)
Facility: CLINIC | Age: 53
Discharge: HOME | End: 2018-06-05
Attending: EMERGENCY MEDICINE | Admitting: EMERGENCY MEDICINE
Payer: COMMERCIAL

## 2018-06-04 ENCOUNTER — OFFICE VISIT (OUTPATIENT)
Dept: ENDOCRINOLOGY | Facility: CLINIC | Age: 53
End: 2018-06-04
Payer: COMMERCIAL

## 2018-06-04 VITALS
SYSTOLIC BLOOD PRESSURE: 127 MMHG | DIASTOLIC BLOOD PRESSURE: 95 MMHG | TEMPERATURE: 98.9 F | HEART RATE: 120 BPM | OXYGEN SATURATION: 98 % | BODY MASS INDEX: 30.31 KG/M2 | WEIGHT: 176.6 LBS

## 2018-06-04 VITALS
HEART RATE: 98 BPM | SYSTOLIC BLOOD PRESSURE: 120 MMHG | BODY MASS INDEX: 30.07 KG/M2 | DIASTOLIC BLOOD PRESSURE: 82 MMHG | HEIGHT: 64 IN | WEIGHT: 176.1 LBS

## 2018-06-04 DIAGNOSIS — R73.9 HYPERGLYCEMIA: ICD-10-CM

## 2018-06-04 DIAGNOSIS — R51.9 ACUTE NONINTRACTABLE HEADACHE, UNSPECIFIED HEADACHE TYPE: ICD-10-CM

## 2018-06-04 DIAGNOSIS — E11.65 TYPE 2 DIABETES MELLITUS WITH HYPERGLYCEMIA, WITHOUT LONG-TERM CURRENT USE OF INSULIN (H): Primary | ICD-10-CM

## 2018-06-04 LAB
GLUCOSE BLDC GLUCOMTR-MCNC: 412 MG/DL (ref 70–99)
HBA1C MFR BLD: 8.7 % (ref 4.3–6)

## 2018-06-04 PROCEDURE — 96372 THER/PROPH/DIAG INJ SC/IM: CPT | Performed by: EMERGENCY MEDICINE

## 2018-06-04 PROCEDURE — 00000146 ZZHCL STATISTIC GLUCOSE BY METER IP

## 2018-06-04 PROCEDURE — 99284 EMERGENCY DEPT VISIT MOD MDM: CPT | Mod: Z6 | Performed by: EMERGENCY MEDICINE

## 2018-06-04 PROCEDURE — 99284 EMERGENCY DEPT VISIT MOD MDM: CPT | Performed by: EMERGENCY MEDICINE

## 2018-06-04 PROCEDURE — 25000132 ZZH RX MED GY IP 250 OP 250 PS 637: Performed by: EMERGENCY MEDICINE

## 2018-06-04 RX ORDER — ALBUTEROL SULFATE 0.83 MG/ML
2.5 SOLUTION RESPIRATORY (INHALATION)
Status: DISCONTINUED | OUTPATIENT
Start: 2018-06-04 | End: 2018-06-05 | Stop reason: HOSPADM

## 2018-06-04 RX ORDER — ACETAMINOPHEN 325 MG/1
975 TABLET ORAL ONCE
Status: DISCONTINUED | OUTPATIENT
Start: 2018-06-04 | End: 2018-06-05 | Stop reason: HOSPADM

## 2018-06-04 RX ORDER — IBUPROFEN 600 MG/1
600 TABLET, FILM COATED ORAL ONCE
Status: COMPLETED | OUTPATIENT
Start: 2018-06-04 | End: 2018-06-04

## 2018-06-04 RX ADMIN — IBUPROFEN 600 MG: 600 TABLET ORAL at 22:32

## 2018-06-04 ASSESSMENT — ENCOUNTER SYMPTOMS
WHEEZING: 1
FEVER: 0
HEADACHES: 1
SHORTNESS OF BREATH: 1
ABDOMINAL PAIN: 0

## 2018-06-04 NOTE — LETTER
6/4/2018       RE: Andrew Lockwood  [address]  [facility] [address]     Dear Colleague,    Thank you for referring your patient, Andrew Lockwood, to the [facility] ENDOCRINOLOGY at [facility]. Please see a copy of my visit note below.    [facility]  Endocrinology  Lenora Haley PA-C  06/04/2018      Chief Complaint:   Diabetes    History of Present Illness:   Andrew Lockwood is a 50 year old male with a history of HIV, CNS toxoplasmosis, and type 2 DM who presents for follow up of type 2 diabetes.    The patient was diagnosed with type 2 diabetes mellitus in 2014.      He has been working with Joanne Barrow in diabetes education to titrate up his Basaglar dose although his blood sugar have remained high.  He has been in the ED 3 times in the past 6 weeks and his blood sugar was 287, 442, and 268 on initial presentation.      He is currently homeless and living in a shelter therefore he really only eats a meal once a day, snacks when food is available.  He reports that he checks     He reports that he has increased BG dose now to 35 units and still BG which he knows as his vision is blurry. He last checked BG on 5/22 and BG that date 222.  Reports another checked this morning and was 214.    He reports has another glucometer and all numbers >300.  Last Sunday BG was 400 ad he took 45 units of Basaglar.      Trulicity gave him a rash.  Following Januvia he had a pericolic abscess so will not take again.      A1C 8.7 today    Blood Glucose Monitoring:  We reviewed glucometer data together.   - Limited as above, one recording    Diabetes monitoring and complications:  CAD: No  Last eye exam results: 05/30/2018, no diabetic retinopathy   Last dental exam: unk  Microalbuminuria: negative 5/2017  HTN: No  On Statin: No  On Aspirin: No  Depression: Yes  Erectile dysfunction: Yes    Review of Systems:   Pertinent items are noted in HPI.  All other systems are negative.    Active  Medications:      acetaminophen (TYLENOL) 325 MG tablet, Take 1-2 tablets (325-650 mg) by mouth every 6 hours as needed for mild pain, Disp: 60 tablet, Rfl: 0     BASAGLAR 100 UNIT/ML injection, Inject 7 units once daily subcutaneously, Disp: 15 mL, Rfl: 3     GENVOYA [phone] mg tablet, Take 1 tablet by mouth daily (with breakfast), Disp: 30 tablet, Rfl: 0     insulin glargine (LANTUS SOLOSTAR) 100 UNIT/ML pen, Take 14 units each morning, Disp: 15 mL, Rfl: 3     nicotine (NICODERM CQ) 21 MG/24HR 24 hr patch, Place 1 patch onto the skin every 24 hours, Disp: 30 patch, Rfl: 1     omeprazole (PRILOSEC) 20 MG CR capsule, TAKE ONE CAPSULE BY MOUTH EVERY DAY, Disp: 90 capsule, Rfl: 1      Allergies:   Metformin and Dulaglutide      Past Medical History:  Horseshoe tear of retina, left eye  Abdominal abscess  Small bowel obstruction   Slow transit constipation  Gastroesophageal reflux disease   Major depressive disorder  Generalized anxiety disorder   Insomnia   Erectile dysfunction  HIV infection  Toxoplasma encephalitis   Thrush   Obstructive sleep apnea      Past Surgical History:  Diagnostic laparoscopy, lysis of adhesions 4/16/18, 7/26/18  Appendectomy 1/31/18  Ulnar collateral ligament repair, right 12/2/16  Craniotomy, tumor excision 4/10/15  Forearm fracture repair    Family History:   Diabetes - brother, father, paternal grandfather   Alzheimer's disease - father      Social History:   Marital Status: single  Presents to clinic : alone  Tobacco Use: 3 cigarettes per day  Alcohol Use: No alcohol use.   PCP: Rubio Cronin    Lost his job, but works for cash days he is free.     Physical Exam:   There were no vitals taken for this visit.     Wt Readings from Last 10 Encounters:   05/11/18 78 kg (172 lb)   05/10/18 80.3 kg (177 lb 1.6 oz)   05/01/18 78.2 kg (172 lb 4.8 oz)   04/28/18 78.8 kg (173 lb 12.8 oz)   04/26/18 78.9 kg (174 lb)   04/20/18 78 kg (172 lb)   04/18/18 78.2 kg (172 lb 8 oz)   04/13/18 79.2  "kg (174 lb 9.6 oz)   03/29/18 77.1 kg (170 lb)   03/27/18 77.1 kg (170 lb)      General: Pleasant, well nourished and hydrated male in NAD.   Pt makes a phone call during visit- trying to fix his car he and a friend at times staying in. Tried to check his BG, found stray strip in bottom of back pack, but it did not work.    Psych:  Mood is \"good,\" affect is appropriate.  Thought form and content are fluid and coherent.    HEENT: Eyes and sclera are clear. Extraocular movements are grossly intact without proptosis.  Nares are patent, mucous membranes moist.  Neck: No masses or JVD are noted.    Resp: Easy and unlabored breathing.   Neuro: Alert and oriented, communicating clearly.   Ext: no swelling or edema        Data:  Lab Results   Component Value Date     05/11/2018    POTASSIUM 3.5 05/11/2018    CHLORIDE 102 05/11/2018    CO2 26 05/11/2018    ANIONGAP 7 05/11/2018     (H) 05/11/2018    BUN 15 05/11/2018    CR 0.80 05/11/2018    LINDA 9.1 05/11/2018     Lab Results   Component Value Date    GFRESTIMATED >90 05/11/2018    GFRESTIMATED >90 05/10/2018    GFRESTIMATED >90 04/26/2018    GFRESTBLACK >90 05/11/2018    GFRESTBLACK >90 05/10/2018    GFRESTBLACK >90 04/26/2018      Lab Results   Component Value Date    MICROL 26 05/23/2017    UMALCR 7.55 05/23/2017      Lab Results   Component Value Date    A1C 6.0 08/11/2017    A1C 6.2 (H) 05/23/2017    A1C 6.4 (H) 01/23/2017    A1C 7.0 (H) 08/18/2016    A1C 8.3 (H) 06/16/2016    HEMOGLOBINA1 7.5 (A) 02/16/2018     No results found for: CPEPT, GADAB, ISCAB    Lab Results   Component Value Date    CHOL 194 08/11/2017    CHOL 180 08/18/2016    TRIG 345 (H) 08/11/2017    TRIG 377 (H) 08/18/2016    HDL 37 (L) 08/11/2017    HDL 33 (L) 08/18/2016    LDL 88 08/11/2017    LDL 72 08/18/2016    NHDL 157 (H) 08/11/2017    NHDL 148 (H) 08/18/2016       Excellent renal function.      Assessment and Plan:  Andrew is a 51 yo marginally controlled diabetic who is changing " basal insulin dose day to day without guidance or parameters.      - Has used Basaglar as high as 45 units, without symptoms of low.  At this point recommend stay at 35 units, but get new glucometer supplies and start Jardiance.  Check BG tid and call with BG <70, >300.   - Extended education how basal insulin works and should be used.  Advised with Jardiance, should help meal blood glucose coverage and overall BG should come down, may need to decrease Basaglar dose.     - Has three pens in back pack, needs care to use open pen first.          Follow-up: CDE next available.     >50% of 30 minute visit spent in face to face counseling, education and coordination of care related to options for better glycemic control as well as preventing, detecting, and treating hypoglycemia.    It is my privilege to be involved in the care of the above patient.     Lenora Haley PA-C, [facility]  [facility]  Diabetes, Endocrinology, and Metabolism  [phone] Appointments/Nurse  [phone] pager  [phone] nurse line    Scribe Disclosure:   I, Kate Maynard, am serving as a scribe to document services personally performed by Lenora Haley PA-C at this visit, based upon the provider's statements to me. All documentation has been reviewed by the aforementioned provider prior to being entered into the official medical record.     Portions of this medical record were completed by a scribe. UPON MY REVIEW AND AUTHENTICATION BY ELECTRONIC SIGNATURE, this confirms (a) I performed the applicable clinical services, and (b) the record is accurate.       Again, thank you for allowing me to participate in the care of your patient.      Sincerely,    Lenora Haley PA-C

## 2018-06-04 NOTE — ED AVS SNAPSHOT
[facility] [place], Emergency Department    [address] [place] [address]    Phone:  [phone]                                       Andrew Lockwood   MRN: [id number]    Department:  [facility], Emergency Department   Date of Visit:  6/4/2018           After Visit Summary Signature Page     I have received my discharge instructions, and my questions have been answered. I have discussed any challenges I see with this plan with the nurse or doctor.    ..........................................................................................................................................  Patient/Patient Representative Signature      ..........................................................................................................................................  Patient Representative Print Name and Relationship to Patient    ..................................................               ................................................  Date                                            Time    ..........................................................................................................................................  Reviewed by Signature/Title    ...................................................              ..............................................  Date                                                            Time

## 2018-06-04 NOTE — ED AVS SNAPSHOT
[facility], Emergency Department    [address] [address]    Phone:  [phone]                                       Andrew Lockwood   MRN: [id number]    Department:  [facility], Emergency Department   Date of Visit:  6/4/2018           Patient Information     Date Of Birth          11/27/1967        Your diagnoses for this visit were:     Hyperglycemia     Acute nonintractable headache, unspecified headache type        You were seen by Thanh Lehman MD.        Discharge Instructions       Take your diabetes medications as prescribed  You do not need antibiotics for your cough  Follow-up with your doctors    Your next 10 appointments already scheduled     Jun 08, 2018 10:30 AM CDT   (Arrive by 10:15 AM)   RETURN HAND with Evin Zamorano MD   [facility] ([facility])    [address] [place] [id number] [phone]            Jun 13, 2018  8:00 AM CDT   (Arrive by 7:45 AM)   New Plastic Surgery with  Julien Walker MD   [facility] ([facility])    [address] [place] [address] [phone]           Do not wear perfume.            Jun 18, 2018  1:30 PM CDT   (Arrive by 1:15 PM)   Office Visit with Joanne Barrow RN   [facility] Diabetes ([facility])    [address] [place] [address] [phone]           Bring a current list of meds and any records pertaining to this visit. For Physicals, please bring immunization records and any forms needing to be filled out. Please arrive 10 minutes early to complete paperwork.            Jun 22, 2018 10:15 AM CDT   (Arrive by 10:00 AM)   Return Visit with VIKASH Eden CNP   [facility] Rectal [facility] ([facility])    [address] [place] [address]  [address]   [phone]            Aug 07, 2018 11:00 AM CDT   (Arrive by 10:45 AM)   RETURN DIABETES with Lenora Haley PA-C   [facility] Endocrinology ([facility] [place])    [address] [facility]  [address] [facility] [address]   [phone]              24 Hour Appointment Hotline       To make an appointment at any [facility], call [phone] ([phone]). If you don't have a family doctor or clinic, we will help you find one. [facility] are conveniently located to serve the needs of you and your family.             Review of your medicines      Our records show that you are taking the medicines listed below. If these are incorrect, please call your family doctor or clinic.        Dose / Directions Last dose taken    * BASAGLAR 100 UNIT/ML injection   Quantity:  15 mL        Inject 7 units once daily subcutaneously   Refills:  3        * LANTUS SOLOSTAR 100 UNIT/ML injection   Quantity:  15 mL   Generic drug:  insulin glargine        Take 14 units each morning   Refills:  3        blood glucose lancets standard   Commonly known as:  no brand specified   Quantity:  100 Box        Use to test blood sugar four times daily or as directed.   Refills:  5        blood glucose monitoring lancets   Quantity:  100 each        Use to test blood sugar 2 times daily or as directed.  Ok to substitute alternative if insurance prefers.   Refills:  11        * blood glucose monitoring meter device kit   Commonly known as:  no brand specified   Quantity:  1 kit        Use to test blood sugar four times daily or as directed.   Refills:  0        * blood glucose monitoring meter device kit   Commonly known as:  no brand specified   Quantity:  1 kit        Use to test blood sugar 4 times daily or as directed.   Refills:  0        * blood glucose monitoring test strip   Commonly known as:  no brand specified   Quantity:  100 each        Use to test blood sugars four times daily or as  directed   Refills:  5        * blood glucose monitoring test strip   Commonly known as:  no brand specified   Dose:  1 strip   Quantity:  100 strip        1 strip by In Vitro route 4 times daily (before meals and nightly) Use to test blood sugars 4 times daily or as directed   Refills:  11        * blood glucose monitoring test strip   Commonly known as:  GELACIO CONTOUR NEXT   Quantity:  100 strip        Use to test blood sugar 3 times daily or as directed.  Ok to substitute alternative if insurance prefers.   Refills:  11        Sjavize-Pikml-Bbwkafxa-TenofAF [id number] MG Tabs per tablet   Commonly known as:  GENVOYA   Dose:  1 tablet   Indication:  Positive HIV Test   Quantity:  30 tablet        Take 1 tablet by mouth daily (with breakfast)   Refills:  0        empagliflozin 10 MG Tabs tablet   Commonly known as:  JARDIANCE   Dose:  10 mg   Quantity:  30 tablet        Take 1 tablet (10 mg) by mouth daily   Refills:  1        * insulin pen needle 31G X 8 MM   Commonly known as:  B-D U/F   Quantity:  100 each        Use 1 pen needles daily or as directed.   Refills:  0        * insulin pen needle 32G X 4 MM   Commonly known as:  BD SCOTT U/F   Quantity:  100 each        Use 1 daily as directed.   Refills:  11        * Notice:  This list has 9 medication(s) that are the same as other medications prescribed for you. Read the directions carefully, and ask your doctor or other care provider to review them with you.            Procedures and tests performed during your visit     Glucose by meter    Glucose monitor nursing POCT      Orders Needing Specimen Collection     None      Pending Results     No orders found for last 3 day(s).            Pending Culture Results     No orders found for last 3 day(s).            Pending Results Instructions     If you had any lab results that were not finalized at the time of your Discharge, you can call the ED Lab Result RN at [phone]. You will be contacted by this team  for any positive Lab results or changes in treatment. The nurses are available 7 days a week from 10A to 6:30P.  You can leave a message 24 hours per day and they will return your call.        Thank you for choosing [facility]       Thank you for choosing [facility] for your care. Our goal is always to provide you with excellent care. Hearing back from our patients is one way we can continue to improve our services. Please take a few minutes to complete the written survey that you may receive in the mail after you visit with us. Thank you!        [email or web address] Information     [employer] gives you secure access to your electronic health record. If you see a primary care provider, you can also send messages to your care team and make appointments. If you have questions, please call your primary care clinic.  If you do not have a primary care provider, please call [phone] and they will assist you.        Care EveryWhere ID     This is your Care EveryWhere ID. This could be used by other organizations to access your [facility] medical records  [id number]        Equal Access to Services     RA JORDAN : Apollo Cody, warebecca centeno, qaiker reyna, dez salazar. So [facility] [phone].    ATENCIÓN: Si habla español, tiene a kohli disposición servicios gratuitos de asistencia lingüística. Llame al [phone].    We comply with applicable federal civil rights laws and [place] laws. We do not discriminate on the basis of race, color, national origin, age, disability, sex, sexual orientation, or gender identity.            After Visit Summary       This is your record. Keep this with you and show to your community pharmacist(s) and doctor(s) at your next visit.

## 2018-06-04 NOTE — PATIENT INSTRUCTIONS
It is great to meet you Andrew!    PLEASE DO NOT INCREASE your Basaglar dose any more without checking your BG at least a couple of days first.    You can continue BG 35 units as long as no low BG.    Please take new medication Jardiance 10 mg daily which should help avoid your blood glucose going high when you eat.   If you tolerate this medication, we may increase the dose to 25 mg daily.  If not, we will need to start mealtime insulin.    Please check your blood sugar morning fasting and evening so we can evaluate what doses you need.      My best wishes,    Lenora Haley PA-C, MPAS  [facility]  Diabetes, Endocrinology, and Metabolism  [phone] Appointments/Nurse  [phone] nurse line  [phone] URGENTafter hours/weekend Endocrinologist on call            c med

## 2018-06-04 NOTE — PROGRESS NOTES
[facility]  Endocrinology  Lenora Haley PA-C  06/04/2018      Chief Complaint:   Diabetes    History of Present Illness:   Andrew Lockwood is a 50 year old male with a history of HIV, CNS toxoplasmosis, and type 2 DM who presents for follow up of type 2 diabetes.    The patient was diagnosed with type 2 diabetes mellitus in 2014.      He has been working with Joanne Barrow in diabetes education to titrate up his Basaglar dose although his blood sugar have remained high.  He has been in the ED 3 times in the past 6 weeks and his blood sugar was 287, 442, and 268 on initial presentation.      He is currently homeless and living in a shelter therefore he really only eats a meal once a day, snacks when food is available.  He reports that he checks     He reports that he has increased BG dose now to 35 units and still BG which he knows as his vision is blurry. He last checked BG on 5/22 and BG that date 222.  Reports another checked this morning and was 214.    He reports has another glucometer and all numbers >300.  Last Sunday BG was 400 ad he took 45 units of Basaglar.      Trulicity gave him a rash.  Following Januvia he had a pericolic abscess so will not take again.      A1C 8.7 today    Blood Glucose Monitoring:  We reviewed glucometer data together.   - Limited as above, one recording    Diabetes monitoring and complications:  CAD: No  Last eye exam results: 05/30/2018, no diabetic retinopathy   Last dental exam: unk  Microalbuminuria: negative 5/2017  HTN: No  On Statin: No  On Aspirin: No  Depression: Yes  Erectile dysfunction: Yes    Review of Systems:   Pertinent items are noted in HPI.  All other systems are negative.    Active Medications:      acetaminophen (TYLENOL) 325 MG tablet, Take 1-2 tablets (325-650 mg) by mouth every 6 hours as needed for mild pain, Disp: 60 tablet, Rfl: 0     BASAGLAR 100 UNIT/ML injection, Inject 7 units once daily subcutaneously, Disp: 15 mL, Rfl: 3     GENVOYA [phone]  mg tablet, Take 1 tablet by mouth daily (with breakfast), Disp: 30 tablet, Rfl: 0     insulin glargine (LANTUS SOLOSTAR) 100 UNIT/ML pen, Take 14 units each morning, Disp: 15 mL, Rfl: 3     nicotine (NICODERM CQ) 21 MG/24HR 24 hr patch, Place 1 patch onto the skin every 24 hours, Disp: 30 patch, Rfl: 1     omeprazole (PRILOSEC) 20 MG CR capsule, TAKE ONE CAPSULE BY MOUTH EVERY DAY, Disp: 90 capsule, Rfl: 1      Allergies:   Metformin and Dulaglutide      Past Medical History:  Horseshoe tear of retina, left eye  Abdominal abscess  Small bowel obstruction   Slow transit constipation  Gastroesophageal reflux disease   Major depressive disorder  Generalized anxiety disorder   Insomnia   Erectile dysfunction  HIV infection  Toxoplasma encephalitis   Thrush   Obstructive sleep apnea      Past Surgical History:  Diagnostic laparoscopy, lysis of adhesions 4/16/18, 7/26/18  Appendectomy 1/31/18  Ulnar collateral ligament repair, right 12/2/16  Craniotomy, tumor excision 4/10/15  Forearm fracture repair    Family History:   Diabetes - brother, father, paternal grandfather   Alzheimer's disease - father      Social History:   Marital Status: single  Presents to clinic : alone  Tobacco Use: 3 cigarettes per day  Alcohol Use: No alcohol use.   PCP: Rubio Cronin    Lost his job, but works for cash days he is free.     Physical Exam:   There were no vitals taken for this visit.     Wt Readings from Last 10 Encounters:   05/11/18 78 kg (172 lb)   05/10/18 80.3 kg (177 lb 1.6 oz)   05/01/18 78.2 kg (172 lb 4.8 oz)   04/28/18 78.8 kg (173 lb 12.8 oz)   04/26/18 78.9 kg (174 lb)   04/20/18 78 kg (172 lb)   04/18/18 78.2 kg (172 lb 8 oz)   04/13/18 79.2 kg (174 lb 9.6 oz)   03/29/18 77.1 kg (170 lb)   03/27/18 77.1 kg (170 lb)      General: Pleasant, well nourished and hydrated male in NAD.   Pt makes a phone call during visit- trying to fix his car he and a friend at times staying in. Tried to check his BG, found stray strip in  "bottom of back pack, but it did not work.    Psych:  Mood is \"good,\" affect is appropriate.  Thought form and content are fluid and coherent.    HEENT: Eyes and sclera are clear. Extraocular movements are grossly intact without proptosis.  Nares are patent, mucous membranes moist.  Neck: No masses or JVD are noted.    Resp: Easy and unlabored breathing.   Neuro: Alert and oriented, communicating clearly.   Ext: no swelling or edema        Data:  Lab Results   Component Value Date     05/11/2018    POTASSIUM 3.5 05/11/2018    CHLORIDE 102 05/11/2018    CO2 26 05/11/2018    ANIONGAP 7 05/11/2018     (H) 05/11/2018    BUN 15 05/11/2018    CR 0.80 05/11/2018    LINDA 9.1 05/11/2018     Lab Results   Component Value Date    GFRESTIMATED >90 05/11/2018    GFRESTIMATED >90 05/10/2018    GFRESTIMATED >90 04/26/2018    GFRESTBLACK >90 05/11/2018    GFRESTBLACK >90 05/10/2018    GFRESTBLACK >90 04/26/2018      Lab Results   Component Value Date    MICROL 26 05/23/2017    UMALCR 7.55 05/23/2017      Lab Results   Component Value Date    A1C 6.0 08/11/2017    A1C 6.2 (H) 05/23/2017    A1C 6.4 (H) 01/23/2017    A1C 7.0 (H) 08/18/2016    A1C 8.3 (H) 06/16/2016    HEMOGLOBINA1 7.5 (A) 02/16/2018     No results found for: CPEPT, GADAB, ISCAB    Lab Results   Component Value Date    CHOL 194 08/11/2017    CHOL 180 08/18/2016    TRIG 345 (H) 08/11/2017    TRIG 377 (H) 08/18/2016    HDL 37 (L) 08/11/2017    HDL 33 (L) 08/18/2016    LDL 88 08/11/2017    LDL 72 08/18/2016    NHDL 157 (H) 08/11/2017    NHDL 148 (H) 08/18/2016       Excellent renal function.      Assessment and Plan:  Andrew is a 49 yo marginally controlled diabetic who is changing basal insulin dose day to day without guidance or parameters.      - Has used Basaglar as high as 45 units, without symptoms of low.  At this point recommend stay at 35 units, but get new glucometer supplies and start Jardiance.  Check BG tid and call with BG <70, >300.   - Extended " education how basal insulin works and should be used.  Advised with Jardiance, should help meal blood glucose coverage and overall BG should come down, may need to decrease Basaglar dose.     - Has three pens in back pack, needs care to use open pen first.          Follow-up: CDE next available.     >50% of 30 minute visit spent in face to face counseling, education and coordination of care related to options for better glycemic control as well as preventing, detecting, and treating hypoglycemia.    It is my privilege to be involved in the care of the above patient.     Lenora Haley PA-C, [facility]  Diabetes, Endocrinology, and Metabolism  [phone] Appointments/Nurse  [phone] pager  [phone] nurse line    Scribe Disclosure:   I, Kate Aleyda, am serving as a scribe to document services personally performed by Lenora Haley PA-C at this visit, based upon the provider's statements to me. All documentation has been reviewed by the aforementioned provider prior to being entered into the official medical record.     Portions of this medical record were completed by a scribe. UPON MY REVIEW AND AUTHENTICATION BY ELECTRONIC SIGNATURE, this confirms (a) I performed the applicable clinical services, and (b) the record is accurate.

## 2018-06-04 NOTE — MR AVS SNAPSHOT
After Visit Summary   6/4/2018    Andrew Lockwood    MRN: [id number]           Patient Information     Date Of Birth          11/27/1967        Visit Information        Provider Department      6/4/2018 11:30 AM Lenora Haley PA-C [facility] Endocrinology        Today's Diagnoses     Diabetes mellitus, type 2 (H)    -  1    Type 2 diabetes mellitus with hyperglycemia, without long-term current use of insulin (H)          Care Instructions    It is great to meet you Andrew!    PLEASE DO NOT INCREASE your Basaglar dose any more without checking your BG at least a couple of days first.    You can continue BG 35 units as long as no low BG.    Please take new medication Jardiance 10 mg daily which should help avoid your blood glucose going high when you eat.   If you tolerate this medication, we may increase the dose to 25 mg daily.  If not, we will need to start mealtime insulin.    Please check your blood sugar morning fasting and evening so we can evaluate what doses you need.      My best wishes,    Lenora Haley PA-C, [facility]  [facility]  Diabetes, Endocrinology, and Metabolism  [phone] Appointments/Nurse  [phone] nurse line  [phone] URGENTafter hours/weekend Endocrinologist on call            c med          Follow-ups after your visit        Follow-up notes from your care team     Return in about 2 weeks (around 6/18/2018).      Your next 10 appointments already scheduled     Jun 08, 2018 10:30 AM CDT   (Arrive by 10:15 AM)   RETURN HAND with Evin Zamorano MD   [facility]opa[facility] ([facility])    [address] [facility] [address] [phone]            Jun 13, 2018  8:00 AM CDT   (Arrive by 7:45 AM)   New Plastic Surgery with MAHENDRA Walker MD   [facility] Plastic and Reconstructive Surgery ([facility])    [address]  [address] [place] [address]    [phone]           Do not wear perfume.            Jun 18, 2018  1:30 PM CDT   (Arrive by 1:15 PM)   Office Visit with Joanne Barrow RN   [facility] Diabetes ([facility])    [address] [place] [id number] [phone]           Bring a current list of meds and any records pertaining to this visit. For Physicals, please bring immunization records and any forms needing to be filled out. Please arrive 10 minutes early to complete paperwork.            Jun 22, 2018 10:15 AM CDT   (Arrive by 10:00 AM)   Return Visit with VIKASH Eden CNP   [facility] Colon and Rectal Surgery ([facility])    [address] [place] [address] [phone]            Aug 07, 2018 11:00 AM CDT   (Arrive by 10:45 AM)   RETURN DIABETES with Lenora Haley PA-C   [facility] Endocrinology ([facility])    [address] [place] [address] [phone]              Who to contact     Please call your clinic at [phone] to:    Ask questions about your health    Make or cancel appointments    Discuss your medicines    Learn about your test results    Speak to your doctor            Additional Information About Your Visit        [employer] Information     [employer] gives you secure access to your electronic health record. If you see a primary care provider, you can also send messages to your care team and make appointments. If you have questions, please call your primary care clinic.  If you do not have a primary care provider, please call [phone] and they will assist you.      [employer] is an electronic gateway that provides easy, online access to your medical records. With [employer], you can request a clinic appointment, read your test results, renew a prescription or communicate with your care team.     To access your existing account, please contact your  "[facility] or call [phone] for assistance.        Care EveryWhere ID     This is your Care EveryWhere ID. This could be used by other organizations to access your [place] medical records  [id number]        Your Vitals Were     Pulse Height BMI (Body Mass Index)             98 1.626 m (5' 4\") 30.23 kg/m2          Blood Pressure from Last 3 Encounters:   06/04/18 120/82   05/11/18 120/89   05/10/18 104/73    Weight from Last 3 Encounters:   06/04/18 79.9 kg (176 lb 1.6 oz)   05/11/18 78 kg (172 lb)   05/10/18 80.3 kg (177 lb 1.6 oz)              We Performed the Following     Hemoglobin A1c POCT          Today's Medication Changes          These changes are accurate as of 6/4/18 12:44 PM.  If you have any questions, ask your nurse or doctor.               Start taking these medicines.        Dose/Directions    empagliflozin 10 MG Tabs tablet   Commonly known as:  JARDIANCE   Used for:  Type 2 diabetes mellitus with hyperglycemia, without long-term current use of insulin (H)   Started by:  Lenora Haley PA-C        Dose:  10 mg   Take 1 tablet (10 mg) by mouth daily   Quantity:  30 tablet   Refills:  1         These medicines have changed or have updated prescriptions.        Dose/Directions    * blood glucose monitoring test strip   Commonly known as:  no brand specified   This may have changed:  Another medication with the same name was changed. Make sure you understand how and when to take each.   Used for:  Type 2 diabetes mellitus without complication, without long-term current use of insulin (H)   Changed by:  Lenora Haley PA-C        Use to test blood sugars four times daily or as directed   Quantity:  100 each   Refills:  5       * blood glucose monitoring test strip   Commonly known as:  no brand specified   This may have changed:  Another medication with the same name was changed. Make sure you understand how and when to take each.   Used for:  Type 2 diabetes " mellitus with complication, without long-term current use of insulin (H)   Changed by:  Lenora Haley PA-C        Dose:  1 strip   1 strip by In Vitro route 4 times daily (before meals and nightly) Use to test blood sugars 4 times daily or as directed   Quantity:  100 strip   Refills:  11       * blood glucose monitoring test strip   Commonly known as:  GELACIO CONTOUR NEXT   This may have changed:  additional instructions   Used for:  Type 2 diabetes mellitus with hyperglycemia, without long-term current use of insulin (H)   Changed by:  Lenora Haley PA-C        Use to test blood sugar 3 times daily or as directed.  Ok to substitute alternative if insurance prefers.   Quantity:  100 strip   Refills:  11       * Notice:  This list has 3 medication(s) that are the same as other medications prescribed for you. Read the directions carefully, and ask your doctor or other care provider to review them with you.         Where to get your medicines      These medications were sent to [address] [facility]  [address] [place] [address] [address] [place], [place] [id number]    Hours:  TRANSPLANT PHONE NUMBER [phone] Phone:  [phone]     blood glucose monitoring test strip    empagliflozin 10 MG Tabs tablet                Primary Care Provider Office Phone # Fax #    Rubio Cronin [phone]       [facility] [address]  [facility] [address]        Equal Access to Services     RA JORDAN AH: Hadii carissa powero Soterence, waaxda luqadaha, qaybta kaalmada adetamiko, dez salazar. So [facility] [phone].    ATENCIÓN: Si habla español, tiene a kohli disposición servicios gratuitos de asistencia lingüística. Llame al [phone].    We comply with applicable federal civil rights laws and [place] laws. We do not discriminate on the basis of race, color, national origin, age, disability, sex, sexual orientation,  or gender identity.            Thank you!     Thank you for choosing [facility] ENDOCRINOLOGY  for your care. Our goal is always to provide you with excellent care. Hearing back from our patients is one way we can continue to improve our services. Please take a few minutes to complete the written survey that you may receive in the mail after your visit with us. Thank you!             Your Updated Medication List - Protect others around you: Learn how to safely use, store and throw away your medicines at www.disposemymeds.org.          This list is accurate as of 6/4/18 12:44 PM.  Always use your most recent med list.                   Brand Name Dispense Instructions for use Diagnosis    * BASAGLAR 100 UNIT/ML injection     15 mL    Inject 7 units once daily subcutaneously    Type 2 diabetes mellitus (H)       * LANTUS SOLOSTAR 100 UNIT/ML injection   Generic drug:  insulin glargine     15 mL    Take 14 units each morning    Type 2 diabetes mellitus with hyperglycemia, without long-term current use of insulin (H)       blood glucose lancets standard    no brand specified    100 Box    Use to test blood sugar four times daily or as directed.    Type 2 diabetes mellitus without complication, without long-term current use of insulin (H)       blood glucose monitoring lancets     100 each    Use to test blood sugar 2 times daily or as directed.  Ok to substitute alternative if insurance prefers.    Type 2 diabetes mellitus with hyperglycemia, without long-term current use of insulin (H)       * blood glucose monitoring meter device kit    no brand specified    1 kit    Use to test blood sugar four times daily or as directed.    Type 2 diabetes mellitus without complication, without long-term current use of insulin (H)       * blood glucose monitoring meter device kit    no brand specified    1 kit    Use to test blood sugar 4 times daily or as directed.    Type 2 diabetes mellitus with complication, without long-term  current use of insulin (H)       * blood glucose monitoring test strip    no brand specified    100 each    Use to test blood sugars four times daily or as directed    Type 2 diabetes mellitus without complication, without long-term current use of insulin (H)       * blood glucose monitoring test strip    no brand specified    100 strip    1 strip by In Vitro route 4 times daily (before meals and nightly) Use to test blood sugars 4 times daily or as directed    Type 2 diabetes mellitus with complication, without long-term current use of insulin (H)       * blood glucose monitoring test strip    GELACIO CONTOUR NEXT    100 strip    Use to test blood sugar 3 times daily or as directed.  Ok to substitute alternative if insurance prefers.    Type 2 diabetes mellitus with hyperglycemia, without long-term current use of insulin (H)       Orddxeu-Shajc-Odulevek-TenofAF [phone] MG Tabs per tablet    GENVOYA    30 tablet    Take 1 tablet by mouth daily (with breakfast)    AIDS (acquired immune deficiency syndrome) (H)       empagliflozin 10 MG Tabs tablet    JARDIANCE    30 tablet    Take 1 tablet (10 mg) by mouth daily    Type 2 diabetes mellitus with hyperglycemia, without long-term current use of insulin (H)       * insulin pen needle 31G X 8 MM    B-D U/F    100 each    Use 1 pen needles daily or as directed.    Type 2 diabetes mellitus without complication, without long-term current use of insulin (H)       * insulin pen needle 32G X 4 MM    BD SCOTT U/F    100 each    Use 1 daily as directed.    Type 2 diabetes mellitus with hyperglycemia, without long-term current use of insulin (H)       nicotine 21 MG/24HR 24 hr patch    NICODERM CQ    30 patch    Place 1 patch onto the skin every 24 hours    Cigarette nicotine dependence without complication       omeprazole 20 MG CR capsule    priLOSEC    90 capsule    TAKE ONE CAPSULE BY MOUTH EVERY DAY    Epigastric pain       * Notice:  This list has 9 medication(s) that  are the same as other medications prescribed for you. Read the directions carefully, and ask your doctor or other care provider to review them with you.

## 2018-06-04 NOTE — NURSING NOTE
Chief Complaint   Patient presents with     RECHECK     Type II Diabetes f/u      Ju Salcido, CMA

## 2018-06-05 LAB — GLUCOSE BLDC GLUCOMTR-MCNC: 280 MG/DL (ref 70–99)

## 2018-06-05 PROCEDURE — 00000146 ZZHCL STATISTIC GLUCOSE BY METER IP

## 2018-06-05 PROCEDURE — 25000131 ZZH RX MED GY IP 250 OP 636 PS 637: Performed by: EMERGENCY MEDICINE

## 2018-06-05 RX ADMIN — INSULIN ASPART 6 UNITS: 100 INJECTION, SOLUTION INTRAVENOUS; SUBCUTANEOUS at 00:14

## 2018-06-05 NOTE — ED TRIAGE NOTES
Patient arrived for a headache lasting one hour. He also feels like he is having dyspnea on exertion, sinus pressure, and pain in his lungs. He is alert, oriented, and ambulatory.

## 2018-06-05 NOTE — DISCHARGE INSTRUCTIONS
Take your diabetes medications as prescribed  You do not need antibiotics for your cough  Follow-up with your doctors

## 2018-06-05 NOTE — ED PROVIDER NOTES
History     Chief Complaint   Patient presents with     Headache     Shortness of Breath     HPI  Andrew Lockwood is a 50 year old male with a history of HIV/AIDS, history of CNS toxoplasmosis, type 2 diabetes, who presents for evaluation of shortness of breath and headache.  The patient comes in with complaint of some mild difficulty breathing and wheeze.  He states he was diagnosed with bronchitis 2 weeks ago or so and is concerned for recurrence/persistence.  He states that he was prescribed antibiotics at that time and took all but the last 2 pills as he has lost some.  The patient admits that he is a smoker.  He reports a history of asthma as an adolescent. The patient also reports headache.  Furthermore, patient reports some concern for hyperglycemia.  He states he was seen in the clinic earlier today and was found hyperglycemic into the 300s.  This is in spite of maintaining his normal insulin regimen.      Current Facility-Administered Medications   Medication     acetaminophen (TYLENOL) tablet 975 mg     albuterol neb solution 2.5 mg     Current Outpatient Prescriptions   Medication     empagliflozin (JARDIANCE) 10 MG TABS tablet     GENVOYA [phone] mg tablet     BASAGLAR 100 UNIT/ML injection     blood glucose (NO BRAND SPECIFIED) lancets standard     blood glucose monitoring (GELACIO CONTOUR NEXT) test strip     blood glucose monitoring (GELACIO MICROLET) lancets     blood glucose monitoring (NO BRAND SPECIFIED) meter device kit     blood glucose monitoring (NO BRAND SPECIFIED) meter device kit     blood glucose monitoring (NO BRAND SPECIFIED) test strip     blood glucose monitoring (NO BRAND SPECIFIED) test strip     insulin glargine (LANTUS SOLOSTAR) 100 UNIT/ML pen     insulin pen needle (B-D U/F) 31G X 8 MM     insulin pen needle (BD SCOTT U/F) 32G X 4 MM     Past Medical History:   Diagnosis Date     AIDS (H)      Allergic rhinitis due to other allergen     DNS     Chronic abdominal pain      CNS  toxoplasmosis (H)      Diabetes type 2, controlled (H)      GERD (gastroesophageal reflux disease)      HIV (human immunodeficiency virus infection) (H)      Periungual wart      Sleep apnea     doesn't use CPAP       Past Surgical History:   Procedure Laterality Date     C NONSPECIFIC PROCEDURE      right forearm fracture     COLONOSCOPY Left 1/22/2016    Procedure: COMBINED COLONOSCOPY, SINGLE OR MULTIPLE BIOPSY/POLYPECTOMY BY BIOPSY;  Surgeon: Clark Saini MD;  Location: [facility]     HC EXPLORE UNDESC TESTIS,INGUIN/SCROTAL       LAPAROSCOPIC APPENDECTOMY N/A 1/31/2018    Procedure: LAPAROSCOPIC APPENDECTOMY;  LAPAROSCOPIC APPENDECTOMY;  Surgeon: Dawn Holt MD;  Location: [facility]     LAPAROSCOPY DIAGNOSTIC (GENERAL) N/A 7/26/2016    Procedure: LAPAROSCOPY DIAGNOSTIC (GENERAL);  Surgeon: Susannah Arriaga MD;  Location: [facility]     LAPAROSCOPY DIAGNOSTIC (GENERAL) N/A 4/16/2018    Procedure: LAPAROSCOPY DIAGNOSTIC (GENERAL);  Diagnostic laparoscopy and lysis of adhesions;  Surgeon: Prince Dowling MD;  Location: [facility]     OPTICAL TRACKING SYSTEM CRANIOTOMY, EXCISE TUMOR, COMBINED Left 4/10/2015    Procedure: COMBINED OPTICAL TRACKING SYSTEM CRANIOTOMY, EXCISE TUMOR;  Surgeon: Mirlande Colmenares MD;  Location: [facility]     REPAIR GAMEKEEPER'S THUMB Right 12/2/2016    Procedure: REPAIR LIGAMENT ULNAR COLLATERAL THUMB (GAMEKEEPER'S);  Surgeon: Evin Zamorano MD;  [facility]       Family History   Problem Relation Age of Onset     DIABETES Brother      DIABETES Father      Alzheimer Disease Father      Unknown/Adopted Mother      DIABETES Paternal Grandfather      CANCER No family hx of      no skin cancer     Skin Cancer No family hx of      no famiy hx of skin cancer     Glaucoma No family hx of      Macular Degeneration No family hx of        Social History   Substance Use Topics     Smoking status: Current Every Day Smoker     Packs/day: 0.20     Last attempt to quit:  10/28/2016     Smokeless tobacco: Former User      Comment: 4 cigarettes     Alcohol use No      Comment: Last etoh in 2007     Allergies   Allergen Reactions     Metformin      Abdominal pain     Dulaglutide Rash       I have reviewed the Medications, Allergies, Past Medical and Surgical History, and Social History in the Epic system.    Review of Systems   Constitutional: Negative for fever.   Respiratory: Positive for shortness of breath (Mild) and wheezing.    Cardiovascular: Negative for chest pain.   Gastrointestinal: Negative for abdominal pain.   Neurological: Positive for headaches.   All other systems reviewed and are negative.      Physical Exam   BP: (!) 127/95  Pulse: 120  Temp: 98.9  F (37.2  C)  Weight: 80.1 kg (176 lb 9.6 oz)  SpO2: 98 %      Physical Exam   Constitutional: He is oriented to person, place, and time. He appears well-developed and well-nourished. No distress.   Eyes: Pupils are equal, round, and reactive to light.   Neck: Neck supple.   Cardiovascular: Normal rate, regular rhythm and normal heart sounds.    Pulmonary/Chest: Effort normal and breath sounds normal.   Abdominal: Soft.   Neurological: He is alert and oriented to person, place, and time. No cranial nerve deficit.   Skin: Skin is warm. He is not diaphoretic.   Psychiatric: He has a normal mood and affect. His behavior is normal.   Nursing note and vitals reviewed.      ED Course     ED Course     Procedures        Medications   acetaminophen (TYLENOL) tablet 975 mg (0 mg Oral Hold 6/4/18 2232)   albuterol neb solution 2.5 mg (0 mg Nebulization Hold 6/4/18 2232)   ibuprofen (ADVIL/MOTRIN) tablet 600 mg (600 mg Oral Given 6/4/18 2232)   insulin aspart (NovoLOG) inj (RAPID ACTING) (6 Units Subcutaneous Given 6/5/18 0014)            Labs Ordered and Resulted from Time of ED Arrival Up to the Time of Departure from the ED   GLUCOSE BY METER - Abnormal; Notable for the following:        Result Value    Glucose 412 (*)     All  other components within normal limits   GLUCOSE MONITOR NURSING POCT            Assessments & Plan (with Medical Decision Making)   Andrew Lockwood is a 50 year old male with history of diabetes, in fact he was in the endocrine clinic today and started on new oral hypoglycemic medication. He is here with multiple complaints including cough and saying that he believes he has bronchitis, he also complains of a mild headache and that his blood sugar was elevated today. His examination is unconcerning, does not appear ill, and lungs clear. I did give him a neb, he is a smoker. His headache was treated with Tylenol and ibuprofen, does not sound to be pathological. We checked his blood sugar which was about 400. I do not think he is in DKA given how good he looks and going back in his charts I do not see he has ever been in DKA. He was given 6 units of insulin subcutaneous at which point he was asking for something to eat. He was also requesting antibiotics for his cough; I said this was not indicated. He does not require any imaging for the headache in my opinion. After we verify that his blood sugar is responding to the insulin he can be discharged to follow up as an outpatient.     I have reviewed the nursing notes.    I have reviewed the findings, diagnosis, plan and need for follow up with the patient.    New Prescriptions    No medications on file       Final diagnoses:   Hyperglycemia   Acute nonintractable headache, unspecified headache type     I, Miky Lima, am serving as a trained medical scribe to document services personally performed by Thanh Lehman MD, based on the provider's statements to me.      I, Thanh Lehman MD, was physically present and have reviewed and verified the accuracy of this note documented by Miky Lima.    6/4/2018   [facility], [place], EMERGENCY DEPARTMENT     Thanh Lehman MD  06/05/18 0042

## 2018-06-08 ENCOUNTER — OFFICE VISIT (OUTPATIENT)
Dept: ORTHOPEDICS | Facility: CLINIC | Age: 53
End: 2018-06-08
Payer: COMMERCIAL

## 2018-06-08 VITALS — HEIGHT: 64 IN | WEIGHT: 176.59 LBS | BODY MASS INDEX: 30.15 KG/M2

## 2018-06-08 DIAGNOSIS — S63.641D RUPTURE OF ULNAR COLLATERAL LIGAMENT OF THUMB, RIGHT, SUBSEQUENT ENCOUNTER: Primary | ICD-10-CM

## 2018-06-08 NOTE — MR AVS SNAPSHOT
After Visit Summary   6/8/2018    Andrew Lockwood    MRN: [id number]           Patient Information     Date Of Birth          11/27/1967        Visit Information        Provider Department      6/8/2018 10:30 AM Evin Zamorano MD [facility]        Today's Diagnoses     Rupture of ulnar collateral ligament of thumb, right, subsequent encounter    -  1       Follow-ups after your visit        Follow-up notes from your care team     Return if symptoms worsen or fail to improve.      Your next 10 appointments already scheduled     Jun 13, 2018  8:00 AM CDT   (Arrive by 7:45 AM)   New Plastic Surgery with MAHENDRA Walker MD   [facility] ([facility] [place])    [address] [place] [id number] [phone]           Do not wear perfume.            Jun 18, 2018  1:30 PM CDT   (Arrive by 1:15 PM)   Office Visit with Joanne Barrow RN   [facility] ([facility] [place] [facility])    [address] [place] [id number] [phone]           Bring a current list of meds and any records pertaining to this visit. For Physicals, please bring immunization records and any forms needing to be filled out. Please arrive 10 minutes early to complete paperwork.            Jun 22, 2018 10:30 AM CDT   (Arrive by 10:15 AM)   Return Visit with VIKASH Eden CNP   [facility] ([facility] [place])    [address] [place] [id number] [phone]            Aug 07, 2018 11:00 AM CDT   (Arrive by 10:45 AM)   RETURN DIABETES with Lenora Haley PA-C   [facility] ([facility] [place] [facility])    [address] [place] [id number] [phone]              Who to contact     Please call your clinic at [phone] to:    Ask questions about your  "health    Make or cancel appointments    Discuss your medicines    Learn about your test results    Speak to your doctor            Additional Information About Your Visit        [employer] Review Source Information     [facility] gives you secure access to your electronic health record. If you see a primary care provider, you can also send messages to your care team and make appointments. If you have questions, please call your primary care clinic.  If you do not have a primary care provider, please call [phone] and they will assist you.      [facility] is an electronic gateway that provides easy, online access to your medical records. With [facility], you can request a clinic appointment, read your test results, renew a prescription or communicate with your care team.     To access your existing account, please contact your [facility] or call [phone] for assistance.        Care EveryWhere ID     This is your Care EveryWhere ID. This could be used by other organizations to access your [place] medical records  [id number]        Your Vitals Were     Height BMI (Body Mass Index)                1.626 m (5' 4\") 30.31 kg/m2           Blood Pressure from Last 3 Encounters:   06/04/18 (!) 127/95   06/04/18 120/82   05/11/18 120/89    Weight from Last 3 Encounters:   06/08/18 80.1 kg (176 lb 9.4 oz)   06/04/18 80.1 kg (176 lb 9.6 oz)   06/04/18 79.9 kg (176 lb 1.6 oz)              Today, you had the following     No orders found for display       Primary Care Provider Office Phone # Fax #    Rubio Cronin [phone]       [address] [address]        Equal Access to Services     RA JORDAN : Hadii carissa Cody, maverick centeno, qaiker lezamaaldez weathers. So [facility] [phone].    ATENCIÓN: Si habla español, tiene a kohli disposición servicios gratuitos de asistencia lingüística. Llame al " [phone].    We comply with applicable federal civil rights laws and [place] laws. We do not discriminate on the basis of race, color, national origin, age, disability, sex, sexual orientation, or gender identity.            Thank you!     Thank you for choosing [facility]  for your care. Our goal is always to provide you with excellent care. Hearing back from our patients is one way we can continue to improve our services. Please take a few minutes to complete the written survey that you may receive in the mail after your visit with us. Thank you!             Your Updated Medication List - Protect others around you: Learn how to safely use, store and throw away your medicines at www.disposemymeds.org.          This list is accurate as of 6/8/18 11:04 AM.  Always use your most recent med list.                   Brand Name Dispense Instructions for use Diagnosis    * BASAGLAR 100 UNIT/ML injection     15 mL    Inject 7 units once daily subcutaneously    Type 2 diabetes mellitus (H)       * LANTUS SOLOSTAR 100 UNIT/ML injection   Generic drug:  insulin glargine     15 mL    Take 14 units each morning    Type 2 diabetes mellitus with hyperglycemia, without long-term current use of insulin (H)       blood glucose lancets standard    no brand specified    100 Box    Use to test blood sugar four times daily or as directed.    Type 2 diabetes mellitus without complication, without long-term current use of insulin (H)       blood glucose monitoring lancets     100 each    Use to test blood sugar 2 times daily or as directed.  Ok to substitute alternative if insurance prefers.    Type 2 diabetes mellitus with hyperglycemia, without long-term current use of insulin (H)       * blood glucose monitoring meter device kit    no brand specified    1 kit    Use to test blood sugar four times daily or as directed.    Type 2 diabetes mellitus without complication, without long-term current use of insulin (H)        * blood glucose monitoring meter device kit    no brand specified    1 kit    Use to test blood sugar 4 times daily or as directed.    Type 2 diabetes mellitus with complication, without long-term current use of insulin (H)       * blood glucose monitoring test strip    no brand specified    100 each    Use to test blood sugars four times daily or as directed    Type 2 diabetes mellitus without complication, without long-term current use of insulin (H)       * blood glucose monitoring test strip    no brand specified    100 strip    1 strip by In Vitro route 4 times daily (before meals and nightly) Use to test blood sugars 4 times daily or as directed    Type 2 diabetes mellitus with complication, without long-term current use of insulin (H)       * blood glucose monitoring test strip    GELACIO CONTOUR NEXT    100 strip    Use to test blood sugar 3 times daily or as directed.  Ok to substitute alternative if insurance prefers.    Type 2 diabetes mellitus with hyperglycemia, without long-term current use of insulin (H)       Dqxhill-Hxqoz-Ufmpartp-TenofAF [phone] MG Tabs per tablet    GENVOYA    30 tablet    Take 1 tablet by mouth daily (with breakfast)    AIDS (acquired immune deficiency syndrome) (H)       empagliflozin 10 MG Tabs tablet    JARDIANCE    30 tablet    Take 1 tablet (10 mg) by mouth daily    Type 2 diabetes mellitus with hyperglycemia, without long-term current use of insulin (H)       * insulin pen needle 31G X 8 MM    B-D U/F    100 each    Use 1 pen needles daily or as directed.    Type 2 diabetes mellitus without complication, without long-term current use of insulin (H)       * insulin pen needle 32G X 4 MM    BD SCOTT U/F    100 each    Use 1 daily as directed.    Type 2 diabetes mellitus with hyperglycemia, without long-term current use of insulin (H)       * Notice:  This list has 9 medication(s) that are the same as other medications prescribed for you. Read the directions carefully,  and ask your doctor or other care provider to review them with you.

## 2018-06-08 NOTE — NURSING NOTE
"Reason For Visit:   Chief Complaint   Patient presents with     RECHECK     R Thumb UCL repair f/u, DOS 12/2/16        Primary MD: Rubio Cronin  Ref. MD: Self    Age: 50 year old    ?  No      Ht 1.626 m (5' 4\")  Wt 80.1 kg (176 lb 9.4 oz)  BMI 30.31 kg/m2      Pain Assessment  Patient Currently in Pain: Denies    Hand Dominance Evaluation  Hand Dominance: Right              Current Outpatient Prescriptions   Medication Sig Dispense Refill     BASAGLAR 100 UNIT/ML injection Inject 7 units once daily subcutaneously (Patient not taking: Reported on 6/4/2018) 15 mL 3     blood glucose (NO BRAND SPECIFIED) lancets standard Use to test blood sugar four times daily or as directed. 100 Box 5     blood glucose monitoring (GELACIO CONTOUR NEXT) test strip Use to test blood sugar 3 times daily or as directed.  Ok to substitute alternative if insurance prefers. 100 strip 11     blood glucose monitoring (GELACIO MICROLET) lancets Use to test blood sugar 2 times daily or as directed.  Ok to substitute alternative if insurance prefers. 100 each 11     blood glucose monitoring (NO BRAND SPECIFIED) meter device kit Use to test blood sugar 4 times daily or as directed. 1 kit 0     blood glucose monitoring (NO BRAND SPECIFIED) meter device kit Use to test blood sugar four times daily or as directed. 1 kit 0     blood glucose monitoring (NO BRAND SPECIFIED) test strip 1 strip by In Vitro route 4 times daily (before meals and nightly) Use to test blood sugars 4 times daily or as directed 100 strip 11     blood glucose monitoring (NO BRAND SPECIFIED) test strip Use to test blood sugars four times daily or as directed 100 each 5     empagliflozin (JARDIANCE) 10 MG TABS tablet Take 1 tablet (10 mg) by mouth daily 30 tablet 1     GENVOYA [phone] mg tablet Take 1 tablet by mouth daily (with breakfast) 30 tablet 0     insulin glargine (LANTUS SOLOSTAR) 100 UNIT/ML pen Take 14 units each morning 15 mL 3     insulin pen " needle (B-D U/F) 31G X 8 MM Use 1 pen needles daily or as directed. 100 each 0     insulin pen needle (BD SCOTT U/F) 32G X 4 MM Use 1 daily as directed. 100 each 11       Allergies   Allergen Reactions     Metformin      Abdominal pain     Dulaglutide Rash       Júnior CHRISTIAN, CMA

## 2018-06-08 NOTE — LETTER
6/8/2018       RE: Andrew Lockwood  [address]  [place] [address]     Dear Colleague,    Thank you for referring your patient, Andrew Lockwood, to the [facility] at [facility]. Please see a copy of my visit note below.    Andrew is here for follow-up of his right thumb.  He is doing very well.  He states that he is back to all of his activities.  He is not wearing a splint any longer.  Denies any pain.    The past medical history was reviewed and documented in the chart.  This includes medications, surgeries, social history as well as review of systems.    Exam demonstrates well-healed incision on the ulnar side of his right thumb.  Motion at the MP joint is 0/30 .  IP range of motion is full.  He has no instability with ulnar or radial stressing of the thumb MP joint, no crepitance.  He can oppose his thumb to the base of his small finger.  No motor, no sensory deficits are noted.  No significant atrophy.  There is brisk capillary refill in all digits and a palpable radial pulse.  No overlying skin changes noted.      Impression: Status post right thumb UCL repair 12/2/16    Plan: He is doing very well.  No further surgical indications.  He can continue with activities now as tolerated.  No indication for any splinting or therapy.  At this point I will plan on seeing him back on an as-needed basis if he is having any further problems with the thumb.  He has our contact information should the need arise.    Again, thank you for allowing me to participate in the care of your patient.      Sincerely,    Evin Zamorano MD

## 2018-06-08 NOTE — PROGRESS NOTES
Andrew is here for follow-up of his right thumb.  He is doing very well.  He states that he is back to all of his activities.  He is not wearing a splint any longer.  Denies any pain.    The past medical history was reviewed and documented in the chart.  This includes medications, surgeries, social history as well as review of systems.    Exam demonstrates well-healed incision on the ulnar side of his right thumb.  Motion at the MP joint is 0/30 .  IP range of motion is full.  He has no instability with ulnar or radial stressing of the thumb MP joint, no crepitance.  He can oppose his thumb to the base of his small finger.  No motor, no sensory deficits are noted.  No significant atrophy.  There is brisk capillary refill in all digits and a palpable radial pulse.  No overlying skin changes noted.      Impression: Status post right thumb UCL repair 12/2/16    Plan: He is doing very well.  No further surgical indications.  He can continue with activities now as tolerated.  No indication for any splinting or therapy.  At this point I will plan on seeing him back on an as-needed basis if he is having any further problems with the thumb.  He has our contact information should the need arise.

## 2018-06-17 ENCOUNTER — HOSPITAL ENCOUNTER (EMERGENCY)
Facility: CLINIC | Age: 53
Discharge: HOME | End: 2018-06-17
Attending: EMERGENCY MEDICINE | Admitting: EMERGENCY MEDICINE
Payer: COMMERCIAL

## 2018-06-17 VITALS
HEART RATE: 91 BPM | OXYGEN SATURATION: 99 % | BODY MASS INDEX: 30.24 KG/M2 | TEMPERATURE: 98 F | SYSTOLIC BLOOD PRESSURE: 132 MMHG | DIASTOLIC BLOOD PRESSURE: 95 MMHG | WEIGHT: 176.2 LBS

## 2018-06-17 DIAGNOSIS — L02.419 ABSCESS OF LOWER EXTREMITY: ICD-10-CM

## 2018-06-17 DIAGNOSIS — L08.9 SKIN INFLAMMATION: ICD-10-CM

## 2018-06-17 LAB — GLUCOSE BLDC GLUCOMTR-MCNC: 142 MG/DL (ref 70–99)

## 2018-06-17 PROCEDURE — 99283 EMERGENCY DEPT VISIT LOW MDM: CPT | Mod: Z6 | Performed by: EMERGENCY MEDICINE

## 2018-06-17 PROCEDURE — 00000146 ZZHCL STATISTIC GLUCOSE BY METER IP

## 2018-06-17 PROCEDURE — 99282 EMERGENCY DEPT VISIT SF MDM: CPT | Performed by: EMERGENCY MEDICINE

## 2018-06-17 RX ORDER — DOXYCYCLINE HYCLATE 100 MG
100 TABLET ORAL 2 TIMES DAILY
Qty: 14 TABLET | Refills: 0 | Status: SHIPPED | OUTPATIENT
Start: 2018-06-17 | End: 2018-06-24

## 2018-06-17 ASSESSMENT — ENCOUNTER SYMPTOMS
FEVER: 0
CHILLS: 0

## 2018-06-17 NOTE — ED PROVIDER NOTES
History     Chief Complaint   Patient presents with     Rash     HPI  Andrew Lockwood is a 50 year old male with a history of HIV and insulin-dependent type 2 diabetes mellitus who presents to the ER to small tender area on his right inner thigh.  Patient says that he gets similar findings when he injects insulin but he did not injecting insulin into his right thigh.  Patient says that the area burns a little but is mildly tender.  He has no fever chills or any systemic symptoms.  Patient denies taking any current antibiotics.  Patient is concerned that his insulin is not reacting well with his body.  Denies any nausea vomiting.  Denies any new pain.  I have reviewed the Medications, Allergies, Past Medical and Surgical History, and Social History in the Epic system.    Review of Systems   Constitutional: Negative for chills and fever.   Skin: Positive for rash.       Physical Exam   BP: (!) 132/95  Pulse: 91  Temp: 98  F (36.7  C)  Weight: 79.9 kg (176 lb 3.2 oz)  SpO2: 98 %      Physical Exam   Constitutional: He is oriented to person, place, and time. He appears well-developed and well-nourished.   HENT:   Head: Normocephalic and atraumatic.   Neck: Normal range of motion. Neck supple.   Cardiovascular: Normal rate, regular rhythm and normal heart sounds.    Pulmonary/Chest: Effort normal. No respiratory distress. He has no wheezes.   Abdominal: Soft. He exhibits no distension. There is no tenderness. There is no rebound.   Musculoskeletal:        Legs:  Neurological: He is alert and oriented to person, place, and time.   Skin: Skin is warm.   Psychiatric: He has a normal mood and affect. His behavior is normal. Thought content normal.       ED Course     ED Course     Procedures             Critical Care time:  none             Labs Ordered and Resulted from Time of ED Arrival Up to the Time of Departure from the ED   GLUCOSE BY METER - Abnormal; Notable for the following:        Result Value    Glucose 142 (*)      All other components within normal limits   GLUCOSE MONITOR NURSING POCT            Assessments & Plan (with Medical Decision Making)   Patient with mild area of tenderness right inner thigh.  This could possibly be an early abscess though there is no area to I&D at this time.  No fluctuation fluctuation or induration of skin.  Does not appear to look like an insect bite.  Patient will be discharged home with doxycycline.  Patient instructed to follow-up with his endocrinologist regarding any changes needs to make to his insulin regimen.  Patient stable for discharge    I have reviewed the nursing notes.    I have reviewed the findings, diagnosis, plan and need for follow up with the patient.    Discharge Medication List as of 6/17/2018  5:33 AM      START taking these medications    Details   doxycycline (VIBRA-TABS) 100 MG tablet Take 1 tablet (100 mg) by mouth 2 times daily for 7 days, Disp-14 tablet, R-0, Local Print             Final diagnoses:   Skin inflammation   Abscess of lower extremity       6/17/2018   [facility], [place], EMERGENCY DEPARTMENT     Analilia Galeas MD  06/17/18 [id number]

## 2018-06-17 NOTE — ED AVS SNAPSHOT
[facility], [place], Emergency Department    [address] [place] [address]    Phone:  [phone]                                       Andrew Lockwood   MRN: [id number]    Department:  [facility], Emergency Department   Date of Visit:  6/17/2018           After Visit Summary Signature Page     I have received my discharge instructions, and my questions have been answered. I have discussed any challenges I see with this plan with the nurse or doctor.    ..........................................................................................................................................  Patient/Patient Representative Signature      ..........................................................................................................................................  Patient Representative Print Name and Relationship to Patient    ..................................................               ................................................  Date                                            Time    ..........................................................................................................................................  Reviewed by Signature/Title    ...................................................              ..............................................  Date                                                            Time

## 2018-06-17 NOTE — DISCHARGE INSTRUCTIONS
Take the antibiotics as prescribed.     Please make an appointment to follow up with Your Primary Care Provider in 2-4 days even if entirely better.

## 2018-06-17 NOTE — ED TRIAGE NOTES
Pt has a red itchy bump on his right thigh below where he injected himself with insulin at 2100. He also has some other lesions on his arms that he attributes to diabetes medications

## 2018-06-17 NOTE — ED AVS SNAPSHOT
[facility], Emergency Department    [address] [facility] [address]    Phone:  [phone]                                       Andrew Lockwood   MRN: [id number]    Department:  [facility], Emergency Department   Date of Visit:  6/17/2018           Patient Information     Date Of Birth          11/27/1967        Your diagnoses for this visit were:     Skin inflammation     Abscess of lower extremity        You were seen by Analilia Galeas MD.        Discharge Instructions       Take the antibiotics as prescribed.     Please make an appointment to follow up with Your Primary Care Provider in 2-4 days even if entirely better.      Discharge References/Attachments     ABSCESS, ANTIOBIOTIC TREATMENT ONLY (ENGLISH)      Your next 10 appointments already scheduled     Jun 18, 2018  1:30 PM CDT   (Arrive by 1:15 PM)   Office Visit with Joanne Barrow RN   [facility] Diabetes ([facility])    [address] [place] [address] [phone]           Bring a current list of meds and any records pertaining to this visit. For Physicals, please bring immunization records and any forms needing to be filled out. Please arrive 10 minutes early to complete paperwork.            Jun 22, 2018 10:30 AM CDT   (Arrive by 10:15 AM)   Return Visit with VIKASH Eden CNP   [facility] Colon and Rectal Surgery ([facility])    [address] [place] [address] [phone]            Aug 07, 2018 11:00 AM CDT   (Arrive by 10:45 AM)   RETURN DIABETES with Lenora Haley PA-C   [facility] Endocrinology ([facility])    [address] [place] [address] [phone]              24 Hour Appointment Hotline       To make an appointment at any [facility], call [phone] ([phone]). If you don't have a family doctor or clinic, we will help  you find one. [facility] are conveniently located to serve the needs of you and your family.             Review of your medicines      START taking        Dose / Directions Last dose taken    doxycycline 100 MG tablet   Commonly known as:  VIBRA-TABS   Dose:  100 mg   Quantity:  14 tablet        Take 1 tablet (100 mg) by mouth 2 times daily for 7 days   Refills:  0          Our records show that you are taking the medicines listed below. If these are incorrect, please call your family doctor or clinic.        Dose / Directions Last dose taken    * BASAGLAR 100 UNIT/ML injection   Quantity:  15 mL        Inject 7 units once daily subcutaneously   Refills:  3        * LANTUS SOLOSTAR 100 UNIT/ML injection   Quantity:  15 mL   Generic drug:  insulin glargine        Take 14 units each morning   Refills:  3        blood glucose lancets standard   Commonly known as:  no brand specified   Quantity:  100 Box        Use to test blood sugar four times daily or as directed.   Refills:  5        blood glucose monitoring lancets   Quantity:  100 each        Use to test blood sugar 2 times daily or as directed.  Ok to substitute alternative if insurance prefers.   Refills:  11        * blood glucose monitoring meter device kit   Commonly known as:  no brand specified   Quantity:  1 kit        Use to test blood sugar four times daily or as directed.   Refills:  0        * blood glucose monitoring meter device kit   Commonly known as:  no brand specified   Quantity:  1 kit        Use to test blood sugar 4 times daily or as directed.   Refills:  0        * blood glucose monitoring test strip   Commonly known as:  no brand specified   Quantity:  100 each        Use to test blood sugars four times daily or as directed   Refills:  5        * blood glucose monitoring test strip   Commonly known as:  no brand specified   Dose:  1 strip   Quantity:  100 strip        1 strip by In Vitro route 4 times daily (before meals and nightly)  Use to test blood sugars 4 times daily or as directed   Refills:  11        * blood glucose monitoring test strip   Commonly known as:  GELACIO CONTOUR NEXT   Quantity:  100 strip        Use to test blood sugar 3 times daily or as directed.  Ok to substitute alternative if insurance prefers.   Refills:  11        Hufenpv-Yulwh-Ctszronm-TenofAF [id number] MG Tabs per tablet   Commonly known as:  GENVOYA   Dose:  1 tablet   Indication:  Positive HIV Test   Quantity:  30 tablet        Take 1 tablet by mouth daily (with breakfast)   Refills:  0        empagliflozin 10 MG Tabs tablet   Commonly known as:  JARDIANCE   Dose:  10 mg   Quantity:  30 tablet        Take 1 tablet (10 mg) by mouth daily   Refills:  1        * insulin pen needle 31G X 8 MM   Commonly known as:  B-D U/F   Quantity:  100 each        Use 1 pen needles daily or as directed.   Refills:  0        * insulin pen needle 32G X 4 MM   Commonly known as:  BD SCOTT U/F   Quantity:  100 each        Use 1 daily as directed.   Refills:  11        * Notice:  This list has 9 medication(s) that are the same as other medications prescribed for you. Read the directions carefully, and ask your doctor or other care provider to review them with you.            Prescriptions were sent or printed at these locations (1 Prescription)                   Other Prescriptions                Printed at Department/Unit printer (1 of 1)         doxycycline (VIBRA-TABS) 100 MG tablet                Procedures and tests performed during your visit     Glucose by meter    Glucose monitor nursing POCT      Orders Needing Specimen Collection     None      Pending Results     No orders found from 6/15/2018 to 6/18/2018.            Pending Culture Results     No orders found from 6/15/2018 to 6/18/2018.            Pending Results Instructions     If you had any lab results that were not finalized at the time of your Discharge, you can call the ED Lab Result RN at [phone]. You will  be contacted by this team for any positive Lab results or changes in treatment. The nurses are available 7 days a week from 10A to 6:30P.  You can leave a message 24 hours per day and they will return your call.        Thank you for choosing [facility]       Thank you for choosing [facility] for your care. Our goal is always to provide you with excellent care. Hearing back from our patients is one way we can continue to improve our services. Please take a few minutes to complete the written survey that you may receive in the mail after you visit with us. Thank you!        [employer] Information     [employer] gives you secure access to your electronic health record. If you see a primary care provider, you can also send messages to your care team and make appointments. If you have questions, please call your primary care clinic.  If you do not have a primary care provider, please call [phone] and they will assist you.        Care EveryWhere ID     This is your Care EveryWhere ID. This could be used by other organizations to access your [facility] medical records  [id number]        Equal Access to Services     RA JORDAN : Hadii carissa powero Soterence, waaxda luqadaha, qaybta kaalmada adetamiko, dez moreno . So [facility] [phone].    ATENCIÓN: Si habla español, tiene a kohli disposición servicios gratuitos de asistencia lingüística. Gurdeep al [phone].    We comply with applicable federal civil rights laws and [place] laws. We do not discriminate on the basis of race, color, national origin, age, disability, sex, sexual orientation, or gender identity.            After Visit Summary       This is your record. Keep this with you and show to your community pharmacist(s) and doctor(s) at your next visit.

## 2018-06-19 ENCOUNTER — HOSPITAL ENCOUNTER (EMERGENCY)
Facility: CLINIC | Age: 53
Discharge: HOME | End: 2018-06-19
Attending: EMERGENCY MEDICINE | Admitting: EMERGENCY MEDICINE
Payer: COMMERCIAL

## 2018-06-19 VITALS
HEART RATE: 87 BPM | DIASTOLIC BLOOD PRESSURE: 92 MMHG | OXYGEN SATURATION: 99 % | RESPIRATION RATE: 18 BRPM | TEMPERATURE: 98 F | SYSTOLIC BLOOD PRESSURE: 118 MMHG

## 2018-06-19 DIAGNOSIS — R10.9 ABDOMINAL CRAMPS: ICD-10-CM

## 2018-06-19 LAB — GLUCOSE BLDC GLUCOMTR-MCNC: 166 MG/DL (ref 70–99)

## 2018-06-19 PROCEDURE — 25000125 ZZHC RX 250: Performed by: EMERGENCY MEDICINE

## 2018-06-19 PROCEDURE — 99283 EMERGENCY DEPT VISIT LOW MDM: CPT | Performed by: EMERGENCY MEDICINE

## 2018-06-19 PROCEDURE — 99283 EMERGENCY DEPT VISIT LOW MDM: CPT | Mod: Z6 | Performed by: EMERGENCY MEDICINE

## 2018-06-19 PROCEDURE — 00000146 ZZHCL STATISTIC GLUCOSE BY METER IP

## 2018-06-19 PROCEDURE — 25000132 ZZH RX MED GY IP 250 OP 250 PS 637: Performed by: EMERGENCY MEDICINE

## 2018-06-19 RX ORDER — POLYETHYLENE GLYCOL 3350 17 G/17G
1 POWDER, FOR SOLUTION ORAL DAILY
Qty: 225 G | Refills: 0 | Status: SHIPPED | OUTPATIENT
Start: 2018-06-19 | End: 2018-07-19

## 2018-06-19 RX ORDER — ONDANSETRON 4 MG/1
4 TABLET, ORALLY DISINTEGRATING ORAL
Status: COMPLETED | OUTPATIENT
Start: 2018-06-19 | End: 2018-06-19

## 2018-06-19 RX ADMIN — LIDOCAINE HYDROCHLORIDE 30 ML: 20 SOLUTION ORAL; TOPICAL at 22:13

## 2018-06-19 RX ADMIN — ONDANSETRON 4 MG: 4 TABLET, ORALLY DISINTEGRATING ORAL at 18:51

## 2018-06-19 ASSESSMENT — ENCOUNTER SYMPTOMS
CONSTIPATION: 1
ABDOMINAL PAIN: 1
FEVER: 0
APPETITE CHANGE: 0

## 2018-06-19 NOTE — ED AVS SNAPSHOT
[facility], Emergency Department    [address] [facility] [address]    Phone:  [phone]                                       Andrew Lockwood   MRN: [id number]    Department:  [facility], Emergency Department   Date of Visit:  6/19/2018           Patient Information     Date Of Birth          11/27/1967        Your diagnoses for this visit were:     Abdominal cramps        You were seen by Thanh Lehman MD.        Discharge Instructions       Start Miralax as directed    Your next 10 appointments already scheduled     Jun 22, 2018  8:00 AM CDT   (Arrive by 7:45 AM)   Return Visit with Wojciech Yusuf MD   [facility] Dermatology ([facility])    [address] [place] [address]  [address] [facility] [address] [phone]            Jun 22, 2018 10:30 AM CDT   (Arrive by 10:15 AM)   Return Visit with VIKASH Eden [facility] Colon and Rectal Surgery ([facility])    [address] [place] [address] [phone]            Aug 07, 2018 11:00 AM CDT   (Arrive by 10:45 AM)   RETURN DIABETES with Lenora Haley PA-C   [facility] Endocrinology ([facility])    [address] [place] [address] [phone]              24 Hour Appointment Hotline       To make an appointment at any [facility], call [phone] ([phone]). If you don't have a family doctor or clinic, we will help you find one. [facility]] are conveniently located to serve the needs of you and your family.             Review of your medicines      START taking        Dose / Directions Last dose taken    polyethylene glycol powder   Commonly known as:  MIRALAX   Dose:  1 capful   Quantity:  225 g        Take 17 g (1 capful) by mouth daily   Refills:  0          Our records show that you are taking the medicines listed below. If these are incorrect, please call your  family doctor or clinic.        Dose / Directions Last dose taken    * BASAGLAR 100 UNIT/ML injection   Quantity:  15 mL        Inject 7 units once daily subcutaneously   Refills:  3        * LANTUS SOLOSTAR 100 UNIT/ML injection   Quantity:  15 mL   Generic drug:  insulin glargine        Take 14 units each morning   Refills:  3        blood glucose lancets standard   Commonly known as:  no brand specified   Quantity:  100 Box        Use to test blood sugar four times daily or as directed.   Refills:  5        blood glucose monitoring lancets   Quantity:  100 each        Use to test blood sugar 2 times daily or as directed.  Ok to substitute alternative if insurance prefers.   Refills:  11        * blood glucose monitoring meter device kit   Commonly known as:  no brand specified   Quantity:  1 kit        Use to test blood sugar four times daily or as directed.   Refills:  0        * blood glucose monitoring meter device kit   Commonly known as:  no brand specified   Quantity:  1 kit        Use to test blood sugar 4 times daily or as directed.   Refills:  0        * blood glucose monitoring test strip   Commonly known as:  no brand specified   Quantity:  100 each        Use to test blood sugars four times daily or as directed   Refills:  5        * blood glucose monitoring test strip   Commonly known as:  no brand specified   Dose:  1 strip   Quantity:  100 strip        1 strip by In Vitro route 4 times daily (before meals and nightly) Use to test blood sugars 4 times daily or as directed   Refills:  11        * blood glucose monitoring test strip   Commonly known as:  GELACIO CONTOUR NEXT   Quantity:  100 strip        Use to test blood sugar 3 times daily or as directed.  Ok to substitute alternative if insurance prefers.   Refills:  11        doxycycline 100 MG tablet   Commonly known as:  VIBRA-TABS   Dose:  100 mg   Quantity:  14 tablet        Take 1 tablet (100 mg) by mouth 2 times daily for 7 days   Refills:  0         Wbriisd-Isaby-Mxammddj-TenofAF [id number] MG Tabs per tablet   Commonly known as:  GENVOYA   Dose:  1 tablet   Indication:  Positive HIV Test   Quantity:  30 tablet        Take 1 tablet by mouth daily (with breakfast)   Refills:  0        empagliflozin 10 MG Tabs tablet   Commonly known as:  JARDIANCE   Dose:  10 mg   Quantity:  30 tablet        Take 1 tablet (10 mg) by mouth daily   Refills:  1        * insulin pen needle 31G X 8 MM   Commonly known as:  B-D U/F   Quantity:  100 each        Use 1 pen needles daily or as directed.   Refills:  0        * insulin pen needle 32G X 4 MM   Commonly known as:  BD SCOTT U/F   Quantity:  100 each        Use 1 daily as directed.   Refills:  11        * Notice:  This list has 9 medication(s) that are the same as other medications prescribed for you. Read the directions carefully, and ask your doctor or other care provider to review them with you.            Prescriptions were sent or printed at these locations (1 Prescription)                   Other Prescriptions                Printed at Department/Unit printer (1 of 1)         polyethylene glycol (MIRALAX) powder                Procedures and tests performed during your visit     Procedure/Test Number of Times Performed    Glucose by meter 1    Glucose monitor nursing POCT 2      Orders Needing Specimen Collection     None      Pending Results     No orders found from 6/17/2018 to 6/20/2018.            Pending Culture Results     No orders found from 6/17/2018 to 6/20/2018.            Pending Results Instructions     If you had any lab results that were not finalized at the time of your Discharge, you can call the ED Lab Result RN at [phone]. You will be contacted by this team for any positive Lab results or changes in treatment. The nurses are available 7 days a week from 10A to 6:30P.  You can leave a message 24 hours per day and they will return your call.        Thank you for choosing [facility]        Thank you for choosing [facility] for your care. Our goal is always to provide you with excellent care. Hearing back from our patients is one way we can continue to improve our services. Please take a few minutes to complete the written survey that you may receive in the mail after you visit with us. Thank you!        [employer] Information     [facility] gives you secure access to your electronic health record. If you see a primary care provider, you can also send messages to your care team and make appointments. If you have questions, please call your primary care clinic.  If you do not have a primary care provider, please call [phone] and they will assist you.        Care EveryWhere ID     This is your Care EveryWhere ID. This could be used by other organizations to access your [facility] medical records  [id number]        Equal Access to Services     RA JORDAN : Apollo Cody, maverick centeno, luis enrique reyna, dez salazar. So [facility] [phone].    ATENCIÓN: Si habla español, tiene a kohli disposición servicios gratuitos de asistencia lingüística. Llame al [phone].    We comply with applicable federal civil rights laws and [place] laws. We do not discriminate on the basis of race, color, national origin, age, disability, sex, sexual orientation, or gender identity.            After Visit Summary       This is your record. Keep this with you and show to your community pharmacist(s) and doctor(s) at your next visit.

## 2018-06-19 NOTE — ED TRIAGE NOTES
Pt presents ambulatory to triage from home. Pt states for past 3 hours has had 5 episodes of emesis and has generalized abd pain. Pt denies changes in urination and Bm's. Has hx appendectomy. Pt has hx diabetes.

## 2018-06-19 NOTE — ED AVS SNAPSHOT
[facility], [place], Emergency Department    [address] [place] [address]    Phone:  [phone]                                       Andrew Lockwood   MRN: [id number]    Department:  [facility], Emergency Department   Date of Visit:  6/19/2018           After Visit Summary Signature Page     I have received my discharge instructions, and my questions have been answered. I have discussed any challenges I see with this plan with the nurse or doctor.    ..........................................................................................................................................  Patient/Patient Representative Signature      ..........................................................................................................................................  Patient Representative Print Name and Relationship to Patient    ..................................................               ................................................  Date                                            Time    ..........................................................................................................................................  Reviewed by Signature/Title    ...................................................              ..............................................  Date                                                            Time

## 2018-06-20 ENCOUNTER — TELEPHONE (OUTPATIENT)
Dept: PHARMACY | Facility: CLINIC | Age: 53
End: 2018-06-20

## 2018-06-20 LAB — GLUCOSE BLDC GLUCOMTR-MCNC: 265 MG/DL (ref 70–99)

## 2018-06-20 NOTE — TELEPHONE ENCOUNTER
Filled 9 weeks of medboxes to be given out 1 week at a time.     Lenka Mabry, [facility]  [phone]

## 2018-06-20 NOTE — ED NOTES
Initial Assessment: VSS. NAD. Communicates needs without difficulty. Pleasant and co-op . Denies SOB. No acute distress noted.

## 2018-06-20 NOTE — ED PROVIDER NOTES
History     Chief Complaint   Patient presents with     Abdominal Pain     HPI  Andrew Lockwood is a 50-year-old male frequently in the emergency department here saying that he has crampy abdominal pain which is a chronic issue he says he is also struggling to have a bowel movement.  He went to the [facility] [place] but nobody would see him.  He is certainly not ill appearing.  He is requesting a GI cocktail and something to help with his constipation.      I have reviewed the Medications, Allergies, Past Medical and Surgical History, and Social History in the [facility] system.    Past Medical History:   Diagnosis Date     AIDS (H)      Allergic rhinitis due to other allergen     DNS     Chronic abdominal pain      CNS toxoplasmosis (H)      Diabetes type 2, controlled (H)      GERD (gastroesophageal reflux disease)      HIV (human immunodeficiency virus infection) (H)      Periungual wart      Sleep apnea     doesn't use CPAP       Past Surgical History:   Procedure Laterality Date     C NONSPECIFIC PROCEDURE      right forearm fracture     COLONOSCOPY Left 1/22/2016    Procedure: COMBINED COLONOSCOPY, SINGLE OR MULTIPLE BIOPSY/POLYPECTOMY BY BIOPSY;  Surgeon: Clark Saini MD;  [facility]     HC EXPLORE UNDESC TESTIS,INGUIN/SCROTAL       LAPAROSCOPIC APPENDECTOMY N/A 1/31/2018    Procedure: LAPAROSCOPIC APPENDECTOMY;  LAPAROSCOPIC APPENDECTOMY;  Surgeon: Dawn Holt MD;  [facility]     LAPAROSCOPY DIAGNOSTIC (GENERAL) N/A 7/26/2016    Procedure: LAPAROSCOPY DIAGNOSTIC (GENERAL);  Surgeon: Susannah Arriaga MD;  [facility]     LAPAROSCOPY DIAGNOSTIC (GENERAL) N/A 4/16/2018    Procedure: LAPAROSCOPY DIAGNOSTIC (GENERAL);  Diagnostic laparoscopy and lysis of adhesions;  Surgeon: Prince Dowling MD;  Location[facility]     OPTICAL TRACKING SYSTEM CRANIOTOMY, EXCISE TUMOR, COMBINED Left 4/10/2015    Procedure: COMBINED OPTICAL TRACKING SYSTEM CRANIOTOMY, EXCISE TUMOR;  Surgeon:  Mirlande Colmenares MD;  Location[facility]     REPAIR GAMEKEEPER'S THUMB Right 12/2/2016    Procedure: REPAIR LIGAMENT ULNAR COLLATERAL THUMB (GAMEKEEPER'S);  Surgeon: Evin Zamorano MD;  Location[facility]       Family History   Problem Relation Age of Onset     Diabetes Brother      Diabetes Father      Alzheimer Disease Father      Unknown/Adopted Mother      Diabetes Paternal Grandfather      Cancer No family hx of      no skin cancer     Skin Cancer No family hx of      no famiy hx of skin cancer     Glaucoma No family hx of      Macular Degeneration No family hx of        Social History   Substance Use Topics     Smoking status: Current Every Day Smoker     Packs/day: 0.20     Last attempt to quit: 10/28/2016     Smokeless tobacco: Former User      Comment: 4 cigarettes     Alcohol use No      Comment: Last etoh in 2007       Current Facility-Administered Medications   Medication     lidocaine (viscous) (XYLOCAINE) 2 % 15 mL, alum & mag hydroxide-simethicone (MYLANTA ES/MAALOX  ES) 15 mL GI Cocktail     Current Outpatient Prescriptions   Medication     polyethylene glycol (MIRALAX) powder     BASAGLAR 100 UNIT/ML injection     blood glucose (NO BRAND SPECIFIED) lancets standard     blood glucose monitoring (GELACIO CONTOUR NEXT) test strip     blood glucose monitoring (GELACIO MICROLET) lancets     blood glucose monitoring (NO BRAND SPECIFIED) meter device kit     blood glucose monitoring (NO BRAND SPECIFIED) meter device kit     blood glucose monitoring (NO BRAND SPECIFIED) test strip     blood glucose monitoring (NO BRAND SPECIFIED) test strip     doxycycline (VIBRA-TABS) 100 MG tablet     empagliflozin (JARDIANCE) 10 MG TABS tablet     GENVOYA 939-078-450-10 mg tablet     insulin glargine (LANTUS SOLOSTAR) 100 UNIT/ML pen     insulin pen needle (B-D U/F) 31G X 8 MM     insulin pen needle (BD SCTOT U/F) 32G X 4 MM        Allergies   Allergen Reactions     Metformin      Abdominal pain     Dulaglutide Rash       Review of Systems   Constitutional: Negative for appetite change and fever.   Gastrointestinal: Positive for abdominal pain (crampy) and constipation.   All other systems reviewed and are negative.    Physical Exam   BP: (!) 118/92  Pulse: 87  Temp: 98  F (36.7  C)  Resp: 18  SpO2: 99 %      Physical Exam   Constitutional: He is oriented to person, place, and time. No distress.   Cardiovascular: Normal rate, regular rhythm and normal heart sounds.    Pulmonary/Chest: Effort normal and breath sounds normal.   Abdominal: Soft. He exhibits no distension. There is no tenderness.   Neurological: He is alert and oriented to person, place, and time.   Psychiatric: He has a normal mood and affect. His behavior is normal.   Nursing note and vitals reviewed.      ED Course     ED Course     Procedures        Medications   lidocaine (viscous) (XYLOCAINE) 2 % 15 mL, alum & mag hydroxide-simethicone (MYLANTA ES/MAALOX  ES) 15 mL GI Cocktail (not administered)   ondansetron (ZOFRAN-ODT) ODT tab 4 mg (4 mg Oral Given 6/19/18 1851)            Labs Ordered and Resulted from Time of ED Arrival Up to the Time of Departure from the ED   GLUCOSE BY METER - Abnormal; Notable for the following:        Result Value    Glucose 166 (*)     All other components within normal limits   GLUCOSE MONITOR NURSING POCT   GLUCOSE MONITOR NURSING POCT            Assessments & Plan (with Medical Decision Making)   50-year-old male frequently in the emergency department with multiple somatic complaints here with crampy abdominal discomfort.  He has a benign exam, there is no indication for checking labs on him.  He was awake and alert and not focused whatsoever on his symptoms when I went into the room.  He asked for a GI cocktail and something for his constipation and we will give him a prescription for MiraLAX and encouraged him to get his care in the primary care clinic.    I have reviewed the nursing notes.    I have reviewed the findings,  diagnosis, plan and need for follow up with the patient.    New Prescriptions    POLYETHYLENE GLYCOL (MIRALAX) POWDER    Take 17 g (1 capful) by mouth daily       Final diagnoses:   Abdominal cramps   IChristiano, am serving as a trained medical scribe to document services personally performed by Mick Lehman MD, based on the provider's statements to me.   IMick MD, was physically present and have reviewed and verified the accuracy of this note documented by Christiano Velazquez.     6/19/2018   [facility], [place], EMERGENCY DEPARTMENT     Thanh Lehman MD  06/19/18 5215

## 2018-06-22 ENCOUNTER — OFFICE VISIT (OUTPATIENT)
Dept: DERMATOLOGY | Facility: CLINIC | Age: 53
End: 2018-06-22
Payer: COMMERCIAL

## 2018-06-22 ENCOUNTER — OFFICE VISIT (OUTPATIENT)
Dept: SURGERY | Facility: CLINIC | Age: 53
End: 2018-06-22
Payer: COMMERCIAL

## 2018-06-22 VITALS
HEART RATE: 86 BPM | TEMPERATURE: 98.9 F | DIASTOLIC BLOOD PRESSURE: 81 MMHG | SYSTOLIC BLOOD PRESSURE: 121 MMHG | OXYGEN SATURATION: 99 % | BODY MASS INDEX: 30.23 KG/M2 | WEIGHT: 176.1 LBS

## 2018-06-22 DIAGNOSIS — L20.0 BESNIER'S PRURIGO: Primary | ICD-10-CM

## 2018-06-22 DIAGNOSIS — K62.82 ANAL DYSPLASIA: Primary | ICD-10-CM

## 2018-06-22 DIAGNOSIS — L72.9 CYST OF SKIN: ICD-10-CM

## 2018-06-22 RX ORDER — TRIAMCINOLONE ACETONIDE 1 MG/G
CREAM TOPICAL EVERY EVENING
Qty: 30 G | Refills: 3 | Status: SHIPPED | OUTPATIENT
Start: 2018-06-22 | End: 2018-07-19

## 2018-06-22 ASSESSMENT — PAIN SCALES - GENERAL
PAINLEVEL: NO PAIN (0)
PAINLEVEL: NO PAIN (0)

## 2018-06-22 NOTE — LETTER
6/22/2018       RE: Andrew Lockwood  [address]  [place] [address]     Dear Colleague,    Thank you for referring your patient, Andrew Lockwood, to the [facility] DERMATOLOGY at [facility]. Please see a copy of my visit note below.                 [facility] Dermatology Note      Dermatology Problem List:    Specialty Problems        Dermatology Diagnoses    Epidermal cyst        Folliculitis        Prurigo nodularis        Periungual wart              CC:   Skin Check (Andrew is here today for a skin check- has a rash on his legs. )        Encounter Date: Jun 22, 2018    History of Present Illness:  Mr. Andrew Lockwood is a 50 year old male who presents as a referral from [facility].    Past Medical History:   Patient Active Problem List   Diagnosis     Allergic rhinitis due to other allergen     Brain lesion     Toxoplasma encephalitis (H)     Pulmonary nodules     Human immunodeficiency virus I infection (H)     Folliculitis     Prurigo nodularis     Epidermal cyst     Shoulder joint pain, unspecified laterality     CNS toxoplasmosis (H)     Constipation     Non-intractable vomiting with nausea, vomiting of unspecified type     Thrush     Insomnia, unspecified insomnia     Bowel obstruction     Toxoplasmosis     Gastroesophageal reflux disease     Headache     Aphthous ulcer     Type 2 diabetes mellitus (H)     Small bowel obstruction     SBO (small bowel obstruction)     Slow transit constipation     Periungual wart     Herpes zoster     Erectile dysfunction, unspecified erectile dysfunction type     Insomnia, unspecified type     Preventative health care     Pain of right thumb     Rupture of ulnar collateral ligament of right thumb     Aftercare following surgery of the musculoskeletal system     Panic disorder     Generalized anxiety disorder     Major depressive disorder, single episode, unspecified     Abdominal pain     Acute appendicitis with localized  peritonitis     S/P appendectomy     Abdominal abscess (H)     Appendicitis     Otitis media     Horseshoe tear of retina of left eye without detachment     Past Medical History:   Diagnosis Date     AIDS (H)      Allergic rhinitis due to other allergen     DNS     Chronic abdominal pain      CNS toxoplasmosis (H)      Diabetes type 2, controlled (H)      GERD (gastroesophageal reflux disease)      HIV (human immunodeficiency virus infection) (H)      Periungual wart      Sleep apnea     doesn't use CPAP       Allergy History:     Allergies   Allergen Reactions     Metformin      Abdominal pain     Dulaglutide Rash       Social History:     reports that he has been smoking.  He has been smoking about 0.20 packs per day. He has quit using smokeless tobacco. He reports that he does not drink alcohol or use illicit drugs.      Family History:  Family History   Problem Relation Age of Onset     Diabetes Brother      Diabetes Father      Alzheimer Disease Father      Unknown/Adopted Mother      Diabetes Paternal Grandfather      Cancer No family hx of      no skin cancer     Skin Cancer No family hx of      no famiy hx of skin cancer     Glaucoma No family hx of      Macular Degeneration No family hx of        Medications:  Current Outpatient Prescriptions   Medication Sig Dispense Refill     blood glucose (NO BRAND SPECIFIED) lancets standard Use to test blood sugar four times daily or as directed. 100 Box 5     blood glucose monitoring (GELACIO CONTOUR NEXT) test strip Use to test blood sugar 3 times daily or as directed.  Ok to substitute alternative if insurance prefers. 100 strip 11     blood glucose monitoring (GELACIO MICROLET) lancets Use to test blood sugar 2 times daily or as directed.  Ok to substitute alternative if insurance prefers. 100 each 11     blood glucose monitoring (NO BRAND SPECIFIED) meter device kit Use to test blood sugar 4 times daily or as directed. 1 kit 0     blood glucose monitoring (NO BRAND  SPECIFIED) meter device kit Use to test blood sugar four times daily or as directed. 1 kit 0     blood glucose monitoring (NO BRAND SPECIFIED) test strip 1 strip by In Vitro route 4 times daily (before meals and nightly) Use to test blood sugars 4 times daily or as directed 100 strip 11     blood glucose monitoring (NO BRAND SPECIFIED) test strip Use to test blood sugars four times daily or as directed 100 each 5     doxycycline (VIBRA-TABS) 100 MG tablet Take 1 tablet (100 mg) by mouth 2 times daily for 7 days 14 tablet 0     empagliflozin (JARDIANCE) 10 MG TABS tablet Take 1 tablet (10 mg) by mouth daily 30 tablet 1     GENVOYA 047-743-618-10 mg tablet Take 1 tablet by mouth daily (with breakfast) 30 tablet 0     insulin glargine (LANTUS SOLOSTAR) 100 UNIT/ML pen Take 14 units each morning 15 mL 3     insulin pen needle (B-D U/F) 31G X 8 MM Use 1 pen needles daily or as directed. 100 each 0     insulin pen needle (BD SCOTT U/F) 32G X 4 MM Use 1 daily as directed. 100 each 11     polyethylene glycol (MIRALAX) powder Take 17 g (1 capful) by mouth daily 225 g 0     BASAGLAR 100 UNIT/ML injection Inject 7 units once daily subcutaneously (Patient not taking: Reported on 6/4/2018) 15 mL 3           [facility] Dermatology Note        Dermatology Problem List:         Specialty Problems      None             CC:   No chief complaint on file.           Encounter Date: Jun 22, 2018     History of Present Illness:  Ms. Na Gomez is a 65 year old female who presents as a referral from [facility].     Past Med  Takes insulin for diabetes and HIV [place] (combination therapy)        Review of Systems:  -As per HPI  -Constitutional: The patient denies fatigue, fevers, chills, unintended weight loss, and night sweats.  -HEENT: Patient denies nonhealing oral sores.  -Skin: As above in HPI. No additional skin concerns.     Physical exam:  Vitals: There were no vitals taken for this visit.  GEN: This is a well  developed, well-nourished male in no acute distress, in a pleasant mood.    SKIN: Total skin excluding the undergarment areas was performed. The exam included the head/face, neck, both arms, chest, back, abdomen, both legs, digits and/or nails.   Over both legs several prurigo-like crusty, slightly infiltrated papules. Similar lesions on the arms and old hyperpigmented scars on buttock.  Patient has Diabetes mellitus and HIV and this could explain this prurigo-like lesions  Small cyst on naso-ocular fold left side --> benign     -No other lesions of concern on areas examined.      Impression/Plan:     1) Prurigo lesions with Diabetes mellitus (HIV) --> itching, superinfection of possibly insect bites    2 times per week disinfect arms and legs with PVP Iodine soap (leave 5 min and then rinse off)    Cetapil Lotion every day on extremities    Triamcinolone cream for about 1 week on the lesions     2) Cyst naso-ocular region left --> benign and proposed not to remove    Patient bothered and want it removed ==> ad dermatosurgery (small intervention)           Follow-up if necessary       Again, thank you for allowing me to participate in the care of your patient.      Sincerely,    Wojciech Yusuf MD

## 2018-06-22 NOTE — MR AVS SNAPSHOT
After Visit Summary   6/22/2018    Andrew Lockwood    MRN: [id number]           Patient Information     Date Of Birth          11/27/1967        Visit Information        Provider Department      6/22/2018 10:30 AM Carrie Mary APRN [facility] Colon and Rectal Surgery        Today's Diagnoses     Anal dysplasia    -  1       Follow-ups after your visit        Your next 10 appointments already scheduled     Aug 07, 2018 11:00 AM CDT   (Arrive by 10:45 AM)   RETURN DIABETES with BHAVANA Rivera [facility] Endocrinology ([facility])    [address] [place] [id number] [phone]              Who to contact     Please call your clinic at [phone] to:    Ask questions about your health    Make or cancel appointments    Discuss your medicines    Learn about your test results    Speak to your doctor            Additional Information About Your Visit        MyChart Information     [employer] gives you secure access to your electronic health record. If you see a primary care provider, you can also send messages to your care team and make appointments. If you have questions, please call your primary care clinic.  If you do not have a primary care provider, please call [phone] and they will assist you.      [employer] is an electronic gateway that provides easy, online access to your medical records. With [facility], you can request a clinic appointment, read your test results, renew a prescription or communicate with your care team.     To access your existing account, please contact your [facility] or call [phone] for assistance.        Care EveryWhere ID     This is your Care EveryWhere ID. This could be used by other organizations to access your [facility] medical records  [id number]        Your Vitals Were     Pulse Temperature Pulse Oximetry BMI (Body Mass Index)          86 98.9  F (37.2  C)  (Oral) 99% 30.23 kg/m2         Blood Pressure from Last 3 Encounters:   06/22/18 121/81   06/19/18 (!) 118/92   06/17/18 (!) 132/95    Weight from Last 3 Encounters:   06/22/18 176 lb 1.6 oz   06/17/18 176 lb 3.2 oz   06/08/18 176 lb 9.4 oz              We Performed the Following     Cytology non gyn (Anal PAP)          Today's Medication Changes          These changes are accurate as of 6/22/18 11:45 AM.  If you have any questions, ask your nurse or doctor.               Start taking these medicines.        Dose/Directions    povidone-iodine 7.5 % Soln topical solution   Commonly known as:  [employer]ICARE POVIDONE-IODINE SCRUB   Used for:  Besnier's prurigo   Started by:  Wojciech Yusuf MD        Wash extremities 2-3 times per week and let 5min on skin before rinsing  Put on legs and arms 1-2 times per day Cetaphil Lotio   Quantity:  473 mL   Refills:  3       triamcinolone 0.1 % cream   Commonly known as:  KENALOG   Used for:  Besnier's prurigo   Started by:  Wojciech Yusuf MD        Apply topically every evening Put on prurigo lesions for 5 days maximum   Quantity:  30 g   Refills:  3            Where to get your medicines      These medications were sent to [facility] [address] [facility]  [address] [place] [address] [address] [address]    Hours:  TRANSPLANT PHONE NUMBER [phone] Phone:  [phone]     triamcinolone 0.1 % cream         Some of these will need a paper prescription and others can be bought over the counter.  Ask your nurse if you have questions.     Bring a paper prescription for each of these medications     povidone-iodine 7.5 % Soln topical solution                Primary Care Provider Office Phone # Fax #    Rubio Cronin [phone]       [address]  [facility] [address]        Equal Access to Services     RA JORDAN AH: maverick Gruber, luis enrique cortes  dez reynaecho rgaan ah. Rebeka [facility] [phone].    ATENCIÓN: Si habla dorothea, tiene a kohli disposición servicios gratuitos de asistencia lingüística. Gurdeep al [phone].    We comply with applicable federal civil rights laws and [place] laws. We do not discriminate on the basis of race, color, national origin, age, disability, sex, sexual orientation, or gender identity.            Thank you!     Thank you for choosing [facility] COLON AND RECTAL SURGERY  for your care. Our goal is always to provide you with excellent care. Hearing back from our patients is one way we can continue to improve our services. Please take a few minutes to complete the written survey that you may receive in the mail after your visit with us. Thank you!             Your Updated Medication List - Protect others around you: Learn how to safely use, store and throw away your medicines at www.disposemymeds.org.          This list is accurate as of 6/22/18 11:45 AM.  Always use your most recent med list.                   Brand Name Dispense Instructions for use Diagnosis    * BASAGLAR 100 UNIT/ML injection     15 mL    Inject 7 units once daily subcutaneously    Type 2 diabetes mellitus (H)       * LANTUS SOLOSTAR 100 UNIT/ML injection   Generic drug:  insulin glargine     15 mL    Take 14 units each morning    Type 2 diabetes mellitus with hyperglycemia, without long-term current use of insulin (H)       blood glucose lancets standard    no brand specified    100 Box    Use to test blood sugar four times daily or as directed.    Type 2 diabetes mellitus without complication, without long-term current use of insulin (H)       blood glucose monitoring lancets     100 each    Use to test blood sugar 2 times daily or as directed.  Ok to substitute alternative if insurance prefers.    Type 2 diabetes mellitus with hyperglycemia, without long-term current use of insulin (H)       * blood glucose monitoring meter device  kit    no brand specified    1 kit    Use to test blood sugar four times daily or as directed.    Type 2 diabetes mellitus without complication, without long-term current use of insulin (H)       * blood glucose monitoring meter device kit    no brand specified    1 kit    Use to test blood sugar 4 times daily or as directed.    Type 2 diabetes mellitus with complication, without long-term current use of insulin (H)       * blood glucose monitoring test strip    no brand specified    100 each    Use to test blood sugars four times daily or as directed    Type 2 diabetes mellitus without complication, without long-term current use of insulin (H)       * blood glucose monitoring test strip    no brand specified    100 strip    1 strip by In Vitro route 4 times daily (before meals and nightly) Use to test blood sugars 4 times daily or as directed    Type 2 diabetes mellitus with complication, without long-term current use of insulin (H)       * blood glucose monitoring test strip    GELACIO CONTOUR NEXT    100 strip    Use to test blood sugar 3 times daily or as directed.  Ok to substitute alternative if insurance prefers.    Type 2 diabetes mellitus with hyperglycemia, without long-term current use of insulin (H)       doxycycline 100 MG tablet    VIBRA-TABS    14 tablet    Take 1 tablet (100 mg) by mouth 2 times daily for 7 days        Hxnnukr-Xeoki-Ywgtisaf-TenofAF [id number] MG Tabs per tablet    GENVOYA    30 tablet    Take 1 tablet by mouth daily (with breakfast)    AIDS (acquired immune deficiency syndrome) (H)       empagliflozin 10 MG Tabs tablet    JARDIANCE    30 tablet    Take 1 tablet (10 mg) by mouth daily    Type 2 diabetes mellitus with hyperglycemia, without long-term current use of insulin (H)       * insulin pen needle 31G X 8 MM    B-D U/F    100 each    Use 1 pen needles daily or as directed.    Type 2 diabetes mellitus without complication, without long-term current use of insulin (H)       *  insulin pen needle 32G X 4 MM    BD SCOTT U/F    100 each    Use 1 daily as directed.    Type 2 diabetes mellitus with hyperglycemia, without long-term current use of insulin (H)       polyethylene glycol powder    MIRALAX    225 g    Take 17 g (1 capful) by mouth daily        povidone-iodine 7.5 % Soln topical solution    APLICARE POVIDONE-IODINE SCRUB    473 mL    Wash extremities 2-3 times per week and let 5min on skin before rinsing  Put on legs and arms 1-2 times per day Cetaphil Lotio    Besnier's prurigo       PRILOSEC PO      Take 10 mg by mouth daily        triamcinolone 0.1 % cream    KENALOG    30 g    Apply topically every evening Put on prurigo lesions for 5 days maximum    Besnier's prurigo       * Notice:  This list has 9 medication(s) that are the same as other medications prescribed for you. Read the directions carefully, and ask your doctor or other care provider to review them with you.

## 2018-06-22 NOTE — LETTER
2018      RE: Andrew Lockwood  [address]  [facility] [address]       Colon and Rectal Surgery Follow-Up Clinic Note    RE: Andrew Lockwood  : 1967  CHRISSY: 2018    Andrew Lockwood is a very pleasant 50 year old male with HIV, DM II who I saw in  with anal fissure. He presents today for anal cytology.    Interval history: Andrew reports that since I saw him last that he has had a difficult time. He was incarcerated and thinks that made his HIV control poor. He now has a detectable viral load and is following with Dr. Rosado. She had performed anal cytology but specimen was not sufficient and it was advised that he come here for repeat anal Pap. He He denies any anorectal complaints. He continues to smoke a half pack per day. He has anoreceptive intercourse.     Assessment/Plan: I obtained anal cytology today. Discussed that if this is normal, it should be repeated yearly. If abnormal would recommend high resolution anoscopy. We discussed the nature of the HPV virus and his risk for anal cancer due to HIV status, smoking, and anoreceptive intercourse. I strongly recommended smoking cessation and he says he has patches so will consider this. Will follow up with him with the results of anal cytology from today for further plan. Encouraged the patient to contact the clinic in the meantime with any questions or concerns. Patient's questions were answered to his stated satisfaction and he is in agreement with this plan.     Medical history:  Past Medical History:   Diagnosis Date     AIDS (H)      Allergic rhinitis due to other allergen     DNS     Chronic abdominal pain      CNS toxoplasmosis (H)      Diabetes type 2, controlled (H)      GERD (gastroesophageal reflux disease)      HIV (human immunodeficiency virus infection) (H)      Periungual wart      Sleep apnea     doesn't use CPAP       Surgical history:  Past Surgical History:   Procedure Laterality Date     C NONSPECIFIC PROCEDURE      right  forearm fracture     COLONOSCOPY Left 1/22/2016    Procedure: COMBINED COLONOSCOPY, SINGLE OR MULTIPLE BIOPSY/POLYPECTOMY BY BIOPSY;  Surgeon: Clark Saini MD;  Location: [facility]     HC EXPLORE UNDESC TESTIS,INGUIN/SCROTAL       LAPAROSCOPIC APPENDECTOMY N/A 1/31/2018    Procedure: LAPAROSCOPIC APPENDECTOMY;  LAPAROSCOPIC APPENDECTOMY;  Surgeon: Dawn Holt MD;  Location: [facility]     LAPAROSCOPY DIAGNOSTIC (GENERAL) N/A 7/26/2016    Procedure: LAPAROSCOPY DIAGNOSTIC (GENERAL);  Surgeon: Susannah Arriaga MD;  Location: [facility]     LAPAROSCOPY DIAGNOSTIC (GENERAL) N/A 4/16/2018    Procedure: LAPAROSCOPY DIAGNOSTIC (GENERAL);  Diagnostic laparoscopy and lysis of adhesions;  Surgeon: Prince Dowling MD;  Location: [facility]     OPTICAL TRACKING SYSTEM CRANIOTOMY, EXCISE TUMOR, COMBINED Left 4/10/2015    Procedure: COMBINED OPTICAL TRACKING SYSTEM CRANIOTOMY, EXCISE TUMOR;  Surgeon: Mirlande Colmenares MD;  Location: [facility]     REPAIR GAMEKEEPER'S THUMB Right 12/2/2016    Procedure: REPAIR LIGAMENT ULNAR COLLATERAL THUMB (GAMEKEEPER'S);  Surgeon: Evin Zamorano MD;  Location: [facility]       Problem list:  Patient Active Problem List    Diagnosis Date Noted     Horseshoe tear of retina of left eye without detachment 03/15/2018     Priority: Medium     Otitis media 03/07/2018     Priority: Medium     Appendicitis 02/11/2018     Priority: Medium     Abdominal abscess (H) 02/04/2018     Priority: Medium     S/P appendectomy 02/01/2018     Priority: Medium     Acute appendicitis with localized peritonitis 01/31/2018     Priority: Medium     Abdominal pain 11/07/2017     Priority: Medium     Panic disorder 06/22/2017     Priority: Medium     Generalized anxiety disorder 06/22/2017     Priority: Medium     Major depressive disorder, single episode, unspecified 06/22/2017     Priority: Medium     Pain of right thumb 03/20/2017     Priority: Medium     Rupture of ulnar collateral ligament of right  thumb 03/20/2017     Priority: Medium     Aftercare following surgery of the musculoskeletal system 03/20/2017     Priority: Medium     Preventative health care 01/17/2017     Priority: Medium     Erectile dysfunction, unspecified erectile dysfunction type 11/19/2016     Priority: Medium     Insomnia, unspecified type 11/19/2016     Priority: Medium     Herpes zoster 09/23/2016     Priority: Medium     Periungual wart 08/31/2016     Priority: Medium     Slow transit constipation 08/26/2016     Priority: Medium     SBO (small bowel obstruction) 07/25/2016     Priority: Medium     Small bowel obstruction 07/21/2016     Priority: Medium     Toxoplasmosis 05/09/2016     Priority: Medium     Bowel obstruction 01/25/2016     Priority: Medium     Thrush 01/07/2016     Priority: Medium     Insomnia, unspecified insomnia 01/07/2016     Priority: Medium     Non-intractable vomiting with nausea, vomiting of unspecified type 01/05/2016     Priority: Medium     Constipation 01/04/2016     Priority: Medium     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     Priority: Medium     Headache 12/17/2015     Priority: Medium     Type 2 diabetes mellitus (H) 12/17/2015     Priority: Medium     Shoulder joint pain, unspecified laterality 12/01/2015     Priority: Medium     Human immunodeficiency virus I infection (H) 11/20/2015     Priority: Medium     Folliculitis 11/20/2015     Priority: Medium     Prurigo nodularis 11/20/2015     Priority: Medium     Epidermal cyst 11/20/2015     Priority: Medium     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 Will be followed up as an outpatient, needs repeat CT in 6-12 months.        Brain lesion 04/09/2015     Priority: Medium     Gastroesophageal reflux disease 12/19/2011     Priority: Medium     Aphthous ulcer 12/19/2011     Priority: Medium     Allergic rhinitis due to  other allergen 10/18/2005     Priority: Medium     DNS         Medications:  Current Outpatient Prescriptions   Medication Sig Dispense Refill     BASAGLAR 100 UNIT/ML injection Inject 7 units once daily subcutaneously 15 mL 3     blood glucose monitoring (GELACIO CONTOUR NEXT) test strip Use to test blood sugar 3 times daily or as directed.  Ok to substitute alternative if insurance prefers. 100 strip 11     blood glucose monitoring (GELACIO MICROLET) lancets Use to test blood sugar 2 times daily or as directed.  Ok to substitute alternative if insurance prefers. 100 each 11     blood glucose monitoring (NO BRAND SPECIFIED) meter device kit Use to test blood sugar 4 times daily or as directed. 1 kit 0     blood glucose monitoring (NO BRAND SPECIFIED) meter device kit Use to test blood sugar four times daily or as directed. 1 kit 0     blood glucose monitoring (NO BRAND SPECIFIED) test strip 1 strip by In Vitro route 4 times daily (before meals and nightly) Use to test blood sugars 4 times daily or as directed 100 strip 11     blood glucose monitoring (NO BRAND SPECIFIED) test strip Use to test blood sugars four times daily or as directed 100 each 5     doxycycline (VIBRA-TABS) 100 MG tablet Take 1 tablet (100 mg) by mouth 2 times daily for 7 days 14 tablet 0     empagliflozin (JARDIANCE) 10 MG TABS tablet Take 1 tablet (10 mg) by mouth daily 30 tablet 1     GENVOYA [id number] mg tablet Take 1 tablet by mouth daily (with breakfast) 30 tablet 0     insulin glargine (LANTUS SOLOSTAR) 100 UNIT/ML pen Take 14 units each morning 15 mL 3     insulin pen needle (B-D U/F) 31G X 8 MM Use 1 pen needles daily or as directed. 100 each 0     insulin pen needle (BD SCOTT U/F) 32G X 4 MM Use 1 daily as directed. 100 each 11     Omeprazole (PRILOSEC PO) Take 10 mg by mouth daily       povidone-iodine (APLICARE POVIDONE-IODINE SCRUB) 7.5 % SOLN topical solution Wash extremities 2-3 times per week and let 5min on skin before  rinsing    Put on legs and arms 1-2 times per day Cetaphil Lotio 473 mL 3     triamcinolone (KENALOG) 0.1 % cream Apply topically every evening Put on prurigo lesions for 5 days maximum 30 g 3     blood glucose (NO BRAND SPECIFIED) lancets standard Use to test blood sugar four times daily or as directed. 100 Box 5     polyethylene glycol (MIRALAX) powder Take 17 g (1 capful) by mouth daily (Patient not taking: Reported on 6/22/2018) 225 g 0       Allergies:  Allergies   Allergen Reactions     Metformin      Abdominal pain     Milk [Lac Bovis] Hives     Tylenol [Acetaminophen] Itching     Dulaglutide Rash       Family history:  Family History   Problem Relation Age of Onset     Diabetes Brother      Diabetes Father      Alzheimer Disease Father      Unknown/Adopted Mother      Diabetes Paternal Grandfather      Cancer No family hx of      no skin cancer     Skin Cancer No family hx of      no famiy hx of skin cancer     Glaucoma No family hx of      Macular Degeneration No family hx of        Social history:  Social History   Substance Use Topics     Smoking status: Current Every Day Smoker     Packs/day: 0.50     Types: Cigarettes     Last attempt to quit: 10/28/2016     Smokeless tobacco: Former User      Comment: 4 cigarettes     Alcohol use No      Comment: Last etoh in 2007     Marital status: single.    Nursing Notes:   Savannah Hagen CMA  6/22/2018 10:30 AM  Signed  Chief Complaint   Patient presents with     Consult     Return patient.       Vitals:    06/22/18 1021   BP: 121/81   BP Location: Left arm   Patient Position: Sitting   Cuff Size: Adult Regular   Pulse: 86   Temp: 98.9  F (37.2  C)   TempSrc: Oral   SpO2: 99%   Weight: 176 lb 1.6 oz       Body mass index is 30.23 kg/(m^2).      RASHEED Covarrubias                         Physical Examination: Exam was chaperoned by RASHEED Covarrubias  /81 (BP Location: Left arm, Patient Position: Sitting, Cuff Size: Adult Regular)  Pulse 86  Temp  98.9  F (37.2  C) (Oral)  Wt 176 lb 1.6 oz  SpO2 99%  BMI 30.23 kg/m2  General: alert, oriented, in no acute distress, sitting comfortably  HEENT: mucous membranes moist  Perianal external examination:  Perianal skin: Intact with no excoriation or lichenification.  Lesions: No evidence of an external lesion, nodularity, or induration in the perianal region.  Eversion of buttocks: There was not evidence of an anal fissure. Details: N/A.  Skin tags or external hemorrhoids: None.    Digital rectal examination: Was deferred.    Anoscopy: Was deferred.    Total face to face time was 20 minutes, >50% counseling.    TIERRA Urias  Colon and Rectal Surgery  [facility]      VIKASH Urias CNP

## 2018-06-22 NOTE — PROGRESS NOTES
[facility] Dermatology Note      Dermatology Problem List:    Specialty Problems        Dermatology Diagnoses    Epidermal cyst        Folliculitis        Prurigo nodularis        Periungual wart              CC:   Skin Check (Andrew is here today for a skin check- has a rash on his legs. )        Encounter Date: Jun 22, 2018    History of Present Illness:  Mr. Andrew Lockwood is a 50 year old male who presents as a referral from [facility].    Past Medical History:   Patient Active Problem List   Diagnosis     Allergic rhinitis due to other allergen     Brain lesion     Toxoplasma encephalitis (H)     Pulmonary nodules     Human immunodeficiency virus I infection (H)     Folliculitis     Prurigo nodularis     Epidermal cyst     Shoulder joint pain, unspecified laterality     CNS toxoplasmosis (H)     Constipation     Non-intractable vomiting with nausea, vomiting of unspecified type     Thrush     Insomnia, unspecified insomnia     Bowel obstruction     Toxoplasmosis     Gastroesophageal reflux disease     Headache     Aphthous ulcer     Type 2 diabetes mellitus (H)     Small bowel obstruction     SBO (small bowel obstruction)     Slow transit constipation     Periungual wart     Herpes zoster     Erectile dysfunction, unspecified erectile dysfunction type     Insomnia, unspecified type     Preventative health care     Pain of right thumb     Rupture of ulnar collateral ligament of right thumb     Aftercare following surgery of the musculoskeletal system     Panic disorder     Generalized anxiety disorder     Major depressive disorder, single episode, unspecified     Abdominal pain     Acute appendicitis with localized peritonitis     S/P appendectomy     Abdominal abscess (H)     Appendicitis     Otitis media     Horseshoe tear of retina of left eye without detachment     Past Medical History:   Diagnosis Date     AIDS (H)      Allergic rhinitis due to other allergen     DNS     Chronic abdominal pain       CNS toxoplasmosis (H)      Diabetes type 2, controlled (H)      GERD (gastroesophageal reflux disease)      HIV (human immunodeficiency virus infection) (H)      Periungual wart      Sleep apnea     doesn't use CPAP       Allergy History:     Allergies   Allergen Reactions     Metformin      Abdominal pain     Dulaglutide Rash       Social History:     reports that he has been smoking.  He has been smoking about 0.20 packs per day. He has quit using smokeless tobacco. He reports that he does not drink alcohol or use illicit drugs.      Family History:  Family History   Problem Relation Age of Onset     Diabetes Brother      Diabetes Father      Alzheimer Disease Father      Unknown/Adopted Mother      Diabetes Paternal Grandfather      Cancer No family hx of      no skin cancer     Skin Cancer No family hx of      no famiy hx of skin cancer     Glaucoma No family hx of      Macular Degeneration No family hx of        Medications:  Current Outpatient Prescriptions   Medication Sig Dispense Refill     blood glucose (NO BRAND SPECIFIED) lancets standard Use to test blood sugar four times daily or as directed. 100 Box 5     blood glucose monitoring (GELACIO CONTOUR NEXT) test strip Use to test blood sugar 3 times daily or as directed.  Ok to substitute alternative if insurance prefers. 100 strip 11     blood glucose monitoring (GELACIO MICROLET) lancets Use to test blood sugar 2 times daily or as directed.  Ok to substitute alternative if insurance prefers. 100 each 11     blood glucose monitoring (NO BRAND SPECIFIED) meter device kit Use to test blood sugar 4 times daily or as directed. 1 kit 0     blood glucose monitoring (NO BRAND SPECIFIED) meter device kit Use to test blood sugar four times daily or as directed. 1 kit 0     blood glucose monitoring (NO BRAND SPECIFIED) test strip 1 strip by In Vitro route 4 times daily (before meals and nightly) Use to test blood sugars 4 times daily or as directed 100 strip 11      blood glucose monitoring (NO BRAND SPECIFIED) test strip Use to test blood sugars four times daily or as directed 100 each 5     doxycycline (VIBRA-TABS) 100 MG tablet Take 1 tablet (100 mg) by mouth 2 times daily for 7 days 14 tablet 0     empagliflozin (JARDIANCE) 10 MG TABS tablet Take 1 tablet (10 mg) by mouth daily 30 tablet 1     GENVOYA [id number] mg tablet Take 1 tablet by mouth daily (with breakfast) 30 tablet 0     insulin glargine (LANTUS SOLOSTAR) 100 UNIT/ML pen Take 14 units each morning 15 mL 3     insulin pen needle (B-D U/F) 31G X 8 MM Use 1 pen needles daily or as directed. 100 each 0     insulin pen needle (BD SCOTT U/F) 32G X 4 MM Use 1 daily as directed. 100 each 11     polyethylene glycol (MIRALAX) powder Take 17 g (1 capful) by mouth daily 225 g 0     BASAGLAR 100 UNIT/ML injection Inject 7 units once daily subcutaneously (Patient not taking: Reported on 6/4/2018) 15 mL 3           [facility] Dermatology Note        Dermatology Problem List:         Specialty Problems      None             CC:   No chief complaint on file.           Encounter Date: Jun 22, 2018     History of Present Illness:  Ms. Na Gomez is a 65 year old female who presents as a referral from [facility].     Past Med  Takes insulin for diabetes and HIV [place] (combination therapy)        Review of Systems:  -As per HPI  -Constitutional: The patient denies fatigue, fevers, chills, unintended weight loss, and night sweats.  -HEENT: Patient denies nonhealing oral sores.  -Skin: As above in HPI. No additional skin concerns.     Physical exam:  Vitals: There were no vitals taken for this visit.  GEN: This is a well developed, well-nourished male in no acute distress, in a pleasant mood.    SKIN: Total skin excluding the undergarment areas was performed. The exam included the head/face, neck, both arms, chest, back, abdomen, both legs, digits and/or nails.   Over both legs several prurigo-like crusty,  slightly infiltrated papules. Similar lesions on the arms and old hyperpigmented scars on buttock.  Patient has Diabetes mellitus and HIV and this could explain this prurigo-like lesions  Small cyst on naso-ocular fold left side --> benign     -No other lesions of concern on areas examined.      Impression/Plan:     1) Prurigo lesions with Diabetes mellitus (HIV) --> itching, superinfection of possibly insect bites    2 times per week disinfect arms and legs with PVP Iodine soap (leave 5 min and then rinse off)    Cetapil Lotion every day on extremities    Triamcinolone cream for about 1 week on the lesions     2) Cyst naso-ocular region left --> benign and proposed not to remove    Patient bothered and want it removed ==> ad dermatosurgery (small intervention)           Follow-up if necessary

## 2018-06-22 NOTE — NURSING NOTE
Chief Complaint   Patient presents with     Consult     Return patient.       Vitals:    06/22/18 1021   BP: 121/81   BP Location: Left arm   Patient Position: Sitting   Cuff Size: Adult Regular   Pulse: 86   Temp: 98.9  F (37.2  C)   TempSrc: Oral   SpO2: 99%   Weight: 176 lb 1.6 oz       Body mass index is 30.23 kg/(m^2).      Savannah Hagen, EMT

## 2018-06-22 NOTE — MR AVS SNAPSHOT
After Visit Summary   6/22/2018    Andrew Lockwood    MRN: [id number]           Patient Information     Date Of Birth          11/27/1967        Visit Information        Provider Department      6/22/2018 8:00 AM Wojciech Yusuf MD [facility] Dermatology        Today's Diagnoses     Besnier's prurigo    -  1    Cyst of skin           Follow-ups after your visit        Additional Services     DERMATOLOGY SURGERY REFERRAL       Benign lesion, but patient wants excision                  Your next 10 appointments already scheduled     Jun 22, 2018 10:30 AM CDT   (Arrive by 10:15 AM)   Return Visit with VIKASH [facility] Colon and Rectal Surgery ([facility] [place])    [address] [place] [address]  4th [facility] [address] [phone]            Aug 07, 2018 11:00 AM CDT   (Arrive by 10:45 AM)   RETURN DIABETES with Lenora Haley PA-C   [facility] Endocrinology ([facility])    [address] [place] [address] [phone]              Who to contact     Please call your clinic at [phone] to:    Ask questions about your health    Make or cancel appointments    Discuss your medicines    Learn about your test results    Speak to your doctor            Additional Information About Your Visit        [employer] Information     [employer] gives you secure access to your electronic health record. If you see a primary care provider, you can also send messages to your care team and make appointments. If you have questions, please call your primary care clinic.  If you do not have a primary care provider, please call [phone] and they will assist you.      [employer] is an electronic gateway that provides easy, online access to your medical records. With [employer], you can request a clinic appointment, read your test results, renew a prescription or communicate with your care team.     To access your existing  account, please contact your [facility] or call [phone] for assistance.        Care EveryWhere ID     This is your Care EveryWhere ID. This could be used by other organizations to access your [facility] medical records  [id number]         Blood Pressure from Last 3 Encounters:   06/19/18 (!) 118/92   06/17/18 (!) 132/95   06/04/18 (!) 127/95    Weight from Last 3 Encounters:   06/17/18 79.9 kg (176 lb 3.2 oz)   06/08/18 80.1 kg (176 lb 9.4 oz)   06/04/18 80.1 kg (176 lb 9.6 oz)              We Performed the Following     DERMATOLOGY SURGERY REFERRAL          Today's Medication Changes          These changes are accurate as of 6/22/18 10:05 AM.  If you have any questions, ask your nurse or doctor.               Start taking these medicines.        Dose/Directions    povidone-iodine 7.5 % Soln topical solution   Commonly known as:  APLICARE POVIDONE-IODINE SCRUB   Used for:  Besnier's prurigo   Started by:  Wojciech Yusuf MD        Wash extremities 2-3 times per week and let 5min on skin before rinsing  Put on legs and arms 1-2 times per day Cetaphil Lotio   Quantity:  473 mL   Refills:  3       triamcinolone 0.1 % cream   Commonly known as:  KENALOG   Used for:  Besnier's prurigo   Started by:  Wojciech Yusuf MD        Apply topically every evening Put on prurigo lesions for 5 days maximum   Quantity:  30 g   Refills:  3            Where to get your medicines      These medications were sent to [facility] [place], [place] - [address] [facility] [address]  [address] [facility] [address] [address]    Hours:  TRANSPLANT PHONE NUMBER [phone] Phone:  [phone]     triamcinolone 0.1 % cream         Some of these will need a paper prescription and others can be bought over the counter.  Ask your nurse if you have questions.     Bring a paper prescription for each of these medications     povidone-iodine 7.5 % Soln topical solution                 Primary Care Provider Office Phone # Fax #    Rubio Cronin [phone]       [address]  [facility] [address]        Equal Access to Services     RA SALAZAR: Hadii aad ku hadsebastianjuan luis Soorenali, waaxda luqadaha, qaybta kaalmada adeegyada, dez ybarratenzin noelecho alfaro josh salazar. So [facility] [phone].    ATENCIÓN: Si habla español, tiene a kohli disposición servicios gratuitos de asistencia lingüística. Llame al [phone].    We comply with applicable federal civil rights laws and [place] laws. We do not discriminate on the basis of race, color, national origin, age, disability, sex, sexual orientation, or gender identity.            Thank you!     Thank you for choosing [facility] DERMATOLOGY  for your care. Our goal is always to provide you with excellent care. Hearing back from our patients is one way we can continue to improve our services. Please take a few minutes to complete the written survey that you may receive in the mail after your visit with us. Thank you!             Your Updated Medication List - Protect others around you: Learn how to safely use, store and throw away your medicines at www.disposemymeds.org.          This list is accurate as of 6/22/18 10:05 AM.  Always use your most recent med list.                   Brand Name Dispense Instructions for use Diagnosis    * BASAGLAR 100 UNIT/ML injection     15 mL    Inject 7 units once daily subcutaneously    Type 2 diabetes mellitus (H)       * LANTUS SOLOSTAR 100 UNIT/ML injection   Generic drug:  insulin glargine     15 mL    Take 14 units each morning    Type 2 diabetes mellitus with hyperglycemia, without long-term current use of insulin (H)       blood glucose lancets standard    no brand specified    100 Box    Use to test blood sugar four times daily or as directed.    Type 2 diabetes mellitus without complication, without long-term current use of insulin (H)       blood glucose monitoring  lancets     100 each    Use to test blood sugar 2 times daily or as directed.  Ok to substitute alternative if insurance prefers.    Type 2 diabetes mellitus with hyperglycemia, without long-term current use of insulin (H)       * blood glucose monitoring meter device kit    no brand specified    1 kit    Use to test blood sugar four times daily or as directed.    Type 2 diabetes mellitus without complication, without long-term current use of insulin (H)       * blood glucose monitoring meter device kit    no brand specified    1 kit    Use to test blood sugar 4 times daily or as directed.    Type 2 diabetes mellitus with complication, without long-term current use of insulin (H)       * blood glucose monitoring test strip    no brand specified    100 each    Use to test blood sugars four times daily or as directed    Type 2 diabetes mellitus without complication, without long-term current use of insulin (H)       * blood glucose monitoring test strip    no brand specified    100 strip    1 strip by In Vitro route 4 times daily (before meals and nightly) Use to test blood sugars 4 times daily or as directed    Type 2 diabetes mellitus with complication, without long-term current use of insulin (H)       * blood glucose monitoring test strip    GELACIO CONTOUR NEXT    100 strip    Use to test blood sugar 3 times daily or as directed.  Ok to substitute alternative if insurance prefers.    Type 2 diabetes mellitus with hyperglycemia, without long-term current use of insulin (H)       doxycycline 100 MG tablet    VIBRA-TABS    14 tablet    Take 1 tablet (100 mg) by mouth 2 times daily for 7 days        Fzofqzn-Xuict-Edrcqier-TenofAF 700-379-305-10 MG Tabs per tablet    GENVOYA    30 tablet    Take 1 tablet by mouth daily (with breakfast)    AIDS (acquired immune deficiency syndrome) (H)       empagliflozin 10 MG Tabs tablet    JARDIANCE    30 tablet    Take 1 tablet (10 mg) by mouth daily    Type 2 diabetes mellitus with  hyperglycemia, without long-term current use of insulin (H)       * insulin pen needle 31G X 8 MM    B-D U/F    100 each    Use 1 pen needles daily or as directed.    Type 2 diabetes mellitus without complication, without long-term current use of insulin (H)       * insulin pen needle 32G X 4 MM    BD SCOTT U/F    100 each    Use 1 daily as directed.    Type 2 diabetes mellitus with hyperglycemia, without long-term current use of insulin (H)       polyethylene glycol powder    MIRALAX    225 g    Take 17 g (1 capful) by mouth daily        povidone-iodine 7.5 % Soln topical solution    APLICARE POVIDONE-IODINE SCRUB    473 mL    Wash extremities 2-3 times per week and let 5min on skin before rinsing  Put on legs and arms 1-2 times per day Cetaphil Lotio    Besnier's prurigo       triamcinolone 0.1 % cream    KENALOG    30 g    Apply topically every evening Put on prurigo lesions for 5 days maximum    Besnier's prurigo       * Notice:  This list has 9 medication(s) that are the same as other medications prescribed for you. Read the directions carefully, and ask your doctor or other care provider to review them with you.

## 2018-06-22 NOTE — PROGRESS NOTES
Colon and Rectal Surgery Follow-Up Clinic Note    RE: Andrew Lockwood  : 1967  CHRISSY: 2018    Andrew Lockwood is a very pleasant 50 year old male with HIV, DM II who I saw in  with anal fissure. He presents today for anal cytology.    Interval history: Andrew reports that since I saw him last that he has had a difficult time. He was incarcerated and thinks that made his HIV control poor. He now has a detectable viral load and is following with Dr. Rosado. She had performed anal cytology but specimen was not sufficient and it was advised that he come here for repeat anal Pap. He He denies any anorectal complaints. He continues to smoke a half pack per day. He has anoreceptive intercourse.     Assessment/Plan: I obtained anal cytology today. Discussed that if this is normal, it should be repeated yearly. If abnormal would recommend high resolution anoscopy. We discussed the nature of the HPV virus and his risk for anal cancer due to HIV status, smoking, and anoreceptive intercourse. I strongly recommended smoking cessation and he says he has patches so will consider this. Will follow up with him with the results of anal cytology from today for further plan. Encouraged the patient to contact the clinic in the meantime with any questions or concerns. Patient's questions were answered to his stated satisfaction and he is in agreement with this plan.     Medical history:  Past Medical History:   Diagnosis Date     AIDS (H)      Allergic rhinitis due to other allergen     DNS     Chronic abdominal pain      CNS toxoplasmosis (H)      Diabetes type 2, controlled (H)      GERD (gastroesophageal reflux disease)      HIV (human immunodeficiency virus infection) (H)      Periungual wart      Sleep apnea     doesn't use CPAP       Surgical history:  Past Surgical History:   Procedure Laterality Date     C NONSPECIFIC PROCEDURE      right forearm fracture     COLONOSCOPY Left 2016    Procedure: COMBINED  COLONOSCOPY, SINGLE OR MULTIPLE BIOPSY/POLYPECTOMY BY BIOPSY;  Surgeon: Clark Saini MD;  Location: [facility]     HC EXPLORE UNDESC TESTIS,INGUIN/SCROTAL       LAPAROSCOPIC APPENDECTOMY N/A 1/31/2018    Procedure: LAPAROSCOPIC APPENDECTOMY;  LAPAROSCOPIC APPENDECTOMY;  Surgeon: Dawn Holt MD;  Location: [facility] OR     LAPAROSCOPY DIAGNOSTIC (GENERAL) N/A 7/26/2016    Procedure: LAPAROSCOPY DIAGNOSTIC (GENERAL);  Surgeon: Susannah Arriaga MD;  Location: [facility] OR     LAPAROSCOPY DIAGNOSTIC (GENERAL) N/A 4/16/2018    Procedure: LAPAROSCOPY DIAGNOSTIC (GENERAL);  Diagnostic laparoscopy and lysis of adhesions;  Surgeon: Prince Dowling MD;  Location: [facility] OR     OPTICAL TRACKING SYSTEM CRANIOTOMY, EXCISE TUMOR, COMBINED Left 4/10/2015    Procedure: COMBINED OPTICAL TRACKING SYSTEM CRANIOTOMY, EXCISE TUMOR;  Surgeon: Mirlande Colmenares MD;  Location: [facility] OR     REPAIR GAMEKEEPER'S THUMB Right 12/2/2016    Procedure: REPAIR LIGAMENT ULNAR COLLATERAL THUMB (GAMEKEEPER'S);  Surgeon: Evin Zamorano MD;  Location: [facility] OR       Problem list:  Patient Active Problem List    Diagnosis Date Noted     Horseshoe tear of retina of left eye without detachment 03/15/2018     Priority: Medium     Otitis media 03/07/2018     Priority: Medium     Appendicitis 02/11/2018     Priority: Medium     Abdominal abscess (H) 02/04/2018     Priority: Medium     S/P appendectomy 02/01/2018     Priority: Medium     Acute appendicitis with localized peritonitis 01/31/2018     Priority: Medium     Abdominal pain 11/07/2017     Priority: Medium     Panic disorder 06/22/2017     Priority: Medium     Generalized anxiety disorder 06/22/2017     Priority: Medium     Major depressive disorder, single episode, unspecified 06/22/2017     Priority: Medium     Pain of right thumb 03/20/2017     Priority: Medium     Rupture of ulnar collateral ligament of right thumb 03/20/2017     Priority: Medium     Aftercare following surgery of  the musculoskeletal system 03/20/2017     Priority: Medium     Preventative health care 01/17/2017     Priority: Medium     Erectile dysfunction, unspecified erectile dysfunction type 11/19/2016     Priority: Medium     Insomnia, unspecified type 11/19/2016     Priority: Medium     Herpes zoster 09/23/2016     Priority: Medium     Periungual wart 08/31/2016     Priority: Medium     Slow transit constipation 08/26/2016     Priority: Medium     SBO (small bowel obstruction) 07/25/2016     Priority: Medium     Small bowel obstruction 07/21/2016     Priority: Medium     Toxoplasmosis 05/09/2016     Priority: Medium     Bowel obstruction 01/25/2016     Priority: Medium     Thrush 01/07/2016     Priority: Medium     Insomnia, unspecified insomnia 01/07/2016     Priority: Medium     Non-intractable vomiting with nausea, vomiting of unspecified type 01/05/2016     Priority: Medium     Constipation 01/04/2016     Priority: Medium     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     Priority: Medium     Headache 12/17/2015     Priority: Medium     Type 2 diabetes mellitus (H) 12/17/2015     Priority: Medium     Shoulder joint pain, unspecified laterality 12/01/2015     Priority: Medium     Human immunodeficiency virus I infection (H) 11/20/2015     Priority: Medium     Folliculitis 11/20/2015     Priority: Medium     Prurigo nodularis 11/20/2015     Priority: Medium     Epidermal cyst 11/20/2015     Priority: Medium     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 Will be followed up as an outpatient, needs repeat CT in 6-12 months.        Brain lesion 04/09/2015     Priority: Medium     Gastroesophageal reflux disease 12/19/2011     Priority: Medium     Aphthous ulcer 12/19/2011     Priority: Medium     Allergic rhinitis due to other allergen 10/18/2005     Priority: Medium     DNS          Medications:  Current Outpatient Prescriptions   Medication Sig Dispense Refill     BASAGLAR 100 UNIT/ML injection Inject 7 units once daily subcutaneously 15 mL 3     blood glucose monitoring (GELACIO CONTOUR NEXT) test strip Use to test blood sugar 3 times daily or as directed.  Ok to substitute alternative if insurance prefers. 100 strip 11     blood glucose monitoring (GELACIO MICROLET) lancets Use to test blood sugar 2 times daily or as directed.  Ok to substitute alternative if insurance prefers. 100 each 11     blood glucose monitoring (NO BRAND SPECIFIED) meter device kit Use to test blood sugar 4 times daily or as directed. 1 kit 0     blood glucose monitoring (NO BRAND SPECIFIED) meter device kit Use to test blood sugar four times daily or as directed. 1 kit 0     blood glucose monitoring (NO BRAND SPECIFIED) test strip 1 strip by In Vitro route 4 times daily (before meals and nightly) Use to test blood sugars 4 times daily or as directed 100 strip 11     blood glucose monitoring (NO BRAND SPECIFIED) test strip Use to test blood sugars four times daily or as directed 100 each 5     doxycycline (VIBRA-TABS) 100 MG tablet Take 1 tablet (100 mg) by mouth 2 times daily for 7 days 14 tablet 0     empagliflozin (JARDIANCE) 10 MG TABS tablet Take 1 tablet (10 mg) by mouth daily 30 tablet 1     GENVOYA 500-025-802-10 mg tablet Take 1 tablet by mouth daily (with breakfast) 30 tablet 0     insulin glargine (LANTUS SOLOSTAR) 100 UNIT/ML pen Take 14 units each morning 15 mL 3     insulin pen needle (B-D U/F) 31G X 8 MM Use 1 pen needles daily or as directed. 100 each 0     insulin pen needle (BD SCOTT U/F) 32G X 4 MM Use 1 daily as directed. 100 each 11     Omeprazole (PRILOSEC PO) Take 10 mg by mouth daily       povidone-iodine (APLICARE POVIDONE-IODINE SCRUB) 7.5 % SOLN topical solution Wash extremities 2-3 times per week and let 5min on skin before rinsing    Put on legs and arms 1-2 times per day Cetaphil Lotio  473 mL 3     triamcinolone (KENALOG) 0.1 % cream Apply topically every evening Put on prurigo lesions for 5 days maximum 30 g 3     blood glucose (NO BRAND SPECIFIED) lancets standard Use to test blood sugar four times daily or as directed. 100 Box 5     polyethylene glycol (MIRALAX) powder Take 17 g (1 capful) by mouth daily (Patient not taking: Reported on 6/22/2018) 225 g 0       Allergies:  Allergies   Allergen Reactions     Metformin      Abdominal pain     Milk [Lac Bovis] Hives     Tylenol [Acetaminophen] Itching     Dulaglutide Rash       Family history:  Family History   Problem Relation Age of Onset     Diabetes Brother      Diabetes Father      Alzheimer Disease Father      Unknown/Adopted Mother      Diabetes Paternal Grandfather      Cancer No family hx of      no skin cancer     Skin Cancer No family hx of      no famiy hx of skin cancer     Glaucoma No family hx of      Macular Degeneration No family hx of        Social history:  Social History   Substance Use Topics     Smoking status: Current Every Day Smoker     Packs/day: 0.50     Types: Cigarettes     Last attempt to quit: 10/28/2016     Smokeless tobacco: Former User      Comment: 4 cigarettes     Alcohol use No      Comment: Last etoh in 2007     Marital status: single.    Nursing Notes:   Savannah Hagen CMA  6/22/2018 10:30 AM  Signed  Chief Complaint   Patient presents with     Consult     Return patient.       Vitals:    06/22/18 1021   BP: 121/81   BP Location: Left arm   Patient Position: Sitting   Cuff Size: Adult Regular   Pulse: 86   Temp: 98.9  F (37.2  C)   TempSrc: Oral   SpO2: 99%   Weight: 176 lb 1.6 oz       Body mass index is 30.23 kg/(m^2).      RASHEED Covarrubias                         Physical Examination: Exam was chaperoned by RASHEED Covarrubias  /81 (BP Location: Left arm, Patient Position: Sitting, Cuff Size: Adult Regular)  Pulse 86  Temp 98.9  F (37.2  C) (Oral)  Wt 176 lb 1.6 oz  SpO2 99%  BMI  30.23 kg/m2  General: alert, oriented, in no acute distress, sitting comfortably  HEENT: mucous membranes moist  Perianal external examination:  Perianal skin: Intact with no excoriation or lichenification.  Lesions: No evidence of an external lesion, nodularity, or induration in the perianal region.  Eversion of buttocks: There was not evidence of an anal fissure. Details: N/A.  Skin tags or external hemorrhoids: None.    Digital rectal examination: Was deferred.    Anoscopy: Was deferred.    Total face to face time was 20 minutes, >50% counseling.    TIERRA Urias  Colon and Rectal Surgery  [facility]

## 2018-06-22 NOTE — NURSING NOTE
Dermatology Rooming Note    Andrew Lockwood's goals for this visit include:   Chief Complaint   Patient presents with     Skin Check     Andrew is here today for a skin check- has a rash on his legs.      Emmanuelle Perez MA

## 2018-06-25 ENCOUNTER — TELEPHONE (OUTPATIENT)
Dept: SURGERY | Facility: CLINIC | Age: 53
End: 2018-06-25

## 2018-06-25 LAB — COPATH REPORT: NORMAL

## 2018-06-25 NOTE — TELEPHONE ENCOUNTER
Called to discuss anal cytology which showed LSIL. Phone not accepting calls. Will contact through [employer] and via e-mail to discuss recommending high resolution anoscopy.   VIKASH Urias, NP-C  Colon and Rectal Surgery  [facility]

## 2018-07-02 ENCOUNTER — OFFICE VISIT (OUTPATIENT)
Dept: ORTHOPEDICS | Facility: CLINIC | Age: 53
End: 2018-07-02
Payer: COMMERCIAL

## 2018-07-02 VITALS
DIASTOLIC BLOOD PRESSURE: 83 MMHG | WEIGHT: 176 LBS | BODY MASS INDEX: 30.21 KG/M2 | HEART RATE: 98 BPM | SYSTOLIC BLOOD PRESSURE: 133 MMHG

## 2018-07-02 DIAGNOSIS — M25.562 CHRONIC PAIN OF LEFT KNEE: Primary | ICD-10-CM

## 2018-07-02 DIAGNOSIS — G89.29 CHRONIC PAIN OF LEFT KNEE: Primary | ICD-10-CM

## 2018-07-02 RX ADMIN — LIDOCAINE HYDROCHLORIDE 4 ML: 10 INJECTION, SOLUTION EPIDURAL; INFILTRATION; INTRACAUDAL; PERINEURAL at 16:07

## 2018-07-02 RX ADMIN — TRIAMCINOLONE ACETONIDE 40 MG: 40 INJECTION, SUSPENSION INTRA-ARTICULAR; INTRAMUSCULAR at 16:07

## 2018-07-02 NOTE — PATIENT INSTRUCTIONS
Ice to knee today and then daily as needed  May use tylenol or ibuprofen as needed  No pools or hot tubs for 48 hours  Follow up as needed   Return to clinic with severe pain, warmth from the joint, or redness, as these may be signs of infection after your injection.

## 2018-07-02 NOTE — MR AVS SNAPSHOT
After Visit Summary   7/2/2018    Andrew Lockwood    MRN: [id number]           Patient Information     Date Of Birth          11/27/1967        Visit Information        Provider Department      7/2/2018 1:30 PM Jarocho Alvarado MD [facility]        Care Instructions    Ice to knee today and then daily as needed  May use tylenol or ibuprofen as needed  No pools or hot tubs for 48 hours  Follow up as needed   Return to clinic with severe pain, warmth from the joint, or redness, as these may be signs of infection after your injection.               Follow-ups after your visit        Your next 10 appointments already scheduled     Jul 19, 2018  9:30 AM CDT   (Arrive by 9:15 AM)   Return Visit with Sharon Rosado MD   [facility] and Infectious Diseases ([facility])    [address] [place] [address]  [address]  [facility] [address] [phone]            Aug 07, 2018 11:00 AM CDT   (Arrive by 10:45 AM)   RETURN DIABETES with Lenora Haley PA-C   [facility] ([facility])    [address] [place] [address]  3rd Floor  [facility] [address] [phone]              Who to contact     Please call your clinic at [phone] to:    Ask questions about your health    Make or cancel appointments    Discuss your medicines    Learn about your test results    Speak to your doctor            Additional Information About Your Visit        MyChart Information     [employer] gives you secure access to your electronic health record. If you see a primary care provider, you can also send messages to your care team and make appointments. If you have questions, please call your primary care clinic.  If you do not have a primary care provider, please call [phone] and they will assist you.      [employer] is an electronic gateway that provides easy, online access to your medical records. With [employer],  you can request a clinic appointment, read your test results, renew a prescription or communicate with your care team.     To access your existing account, please contact your [facility] or call [phone] for assistance.        Care EveryWhere ID     This is your Care EveryWhere ID. This could be used by other organizations to access your [place] medical records  [id number]        Your Vitals Were     Pulse BMI (Body Mass Index)                98 30.21 kg/m2           Blood Pressure from Last 3 Encounters:   07/02/18 133/83   06/22/18 121/81   06/19/18 (!) 118/92    Weight from Last 3 Encounters:   07/02/18 176 lb (79.8 kg)   06/22/18 176 lb 1.6 oz (79.9 kg)   06/17/18 176 lb 3.2 oz (79.9 kg)              Today, you had the following     No orders found for display       Primary Care Provider Office Phone # Fax #    Rubio Cronin [phone]       [place] [address]        Equal Access to Services     ANGELINA JORDAN : Hadii carissa ku janneto Soterence, waaxda luqadaha, qaybta kaalmada axel, dez moreno . So [facility] [phone].    ATENCIÓN: Si habla español, tiene a kohli disposición servicios gratuitos de asistencia lingüística. Llame al [phone].    We comply with applicable federal civil rights laws and [place] laws. We do not discriminate on the basis of race, color, national origin, age, disability, sex, sexual orientation, or gender identity.            Thank you!     Thank you for choosing [facility]  for your care. Our goal is always to provide you with excellent care. Hearing back from our patients is one way we can continue to improve our services. Please take a few minutes to complete the written survey that you may receive in the mail after your visit with us. Thank you!             Your Updated Medication List - Protect others around you:  Learn how to safely use, store and throw away your medicines at www.disposemymeds.org.          This list is accurate as of 7/2/18  2:57 PM.  Always use your most recent med list.                   Brand Name Dispense Instructions for use Diagnosis    * BASAGLAR 100 UNIT/ML injection     15 mL    Inject 7 units once daily subcutaneously    Type 2 diabetes mellitus (H)       * LANTUS SOLOSTAR 100 UNIT/ML injection   Generic drug:  insulin glargine     15 mL    Take 14 units each morning    Type 2 diabetes mellitus with hyperglycemia, without long-term current use of insulin (H)       blood glucose lancets standard    no brand specified    100 Box    Use to test blood sugar four times daily or as directed.    Type 2 diabetes mellitus without complication, without long-term current use of insulin (H)       blood glucose monitoring lancets     100 each    Use to test blood sugar 2 times daily or as directed.  Ok to substitute alternative if insurance prefers.    Type 2 diabetes mellitus with hyperglycemia, without long-term current use of insulin (H)       * blood glucose monitoring meter device kit    no brand specified    1 kit    Use to test blood sugar four times daily or as directed.    Type 2 diabetes mellitus without complication, without long-term current use of insulin (H)       * blood glucose monitoring meter device kit    no brand specified    1 kit    Use to test blood sugar 4 times daily or as directed.    Type 2 diabetes mellitus with complication, without long-term current use of insulin (H)       * blood glucose monitoring test strip    no brand specified    100 each    Use to test blood sugars four times daily or as directed    Type 2 diabetes mellitus without complication, without long-term current use of insulin (H)       * blood glucose monitoring test strip    no brand specified    100 strip    1 strip by In Vitro route 4 times daily (before meals and nightly) Use to test blood sugars 4 times daily  or as directed    Type 2 diabetes mellitus with complication, without long-term current use of insulin (H)       * blood glucose monitoring test strip    GELACIO CONTOUR NEXT    100 strip    Use to test blood sugar 3 times daily or as directed.  Ok to substitute alternative if insurance prefers.    Type 2 diabetes mellitus with hyperglycemia, without long-term current use of insulin (H)       Paksfxu-Criza-Bnqngqcu-TenofAF [phone] MG Tabs per tablet    GENVOYA    30 tablet    Take 1 tablet by mouth daily (with breakfast)    AIDS (acquired immune deficiency syndrome) (H)       empagliflozin 10 MG Tabs tablet    JARDIANCE    30 tablet    Take 1 tablet (10 mg) by mouth daily    Type 2 diabetes mellitus with hyperglycemia, without long-term current use of insulin (H)       * insulin pen needle 31G X 8 MM    B-D U/F    100 each    Use 1 pen needles daily or as directed.    Type 2 diabetes mellitus without complication, without long-term current use of insulin (H)       * insulin pen needle 32G X 4 MM    BD SCOTT U/F    100 each    Use 1 daily as directed.    Type 2 diabetes mellitus with hyperglycemia, without long-term current use of insulin (H)       polyethylene glycol powder    MIRALAX    225 g    Take 17 g (1 capful) by mouth daily        povidone-iodine 7.5 % Soln topical solution    APLICARE POVIDONE-IODINE SCRUB    473 mL    Wash extremities 2-3 times per week and let 5min on skin before rinsing  Put on legs and arms 1-2 times per day Cetaphil Lotio    Besnier's prurigo       PRILOSEC PO      Take 10 mg by mouth daily        triamcinolone 0.1 % cream    KENALOG    30 g    Apply topically every evening Put on prurigo lesions for 5 days maximum    Besnier's prurigo       * Notice:  This list has 9 medication(s) that are the same as other medications prescribed for you. Read the directions carefully, and ask your doctor or other care provider to review them with you.

## 2018-07-02 NOTE — PROGRESS NOTES
SPORTS & ORTHOPEDIC WALK-IN VISIT 7/2/2018    Primary Care Physician: Dr. Cronin    Requesting injection. Last one 1/17/18, lasted until about a week ago.     Reason for visit:     What part of your body is injured / painful?  left knee    What caused the injury /pain? No inciting event     How long ago did your injury occur or pain begin? problem is longstanding    What are your most bothersome symptoms? Pain    How would you characterize your symptom?  constant    What makes your symptoms better? Other: injections    What makes your symptoms worse? Standing and Walking    Have you been previously seen for this problem? Yes, Dr. Ingram, Dr. Cannon    Medical History:    Any recent changes to your medical history? No    Any new medication prescribed since last visit? No    Have you had surgery on this body part before? No    Social History:    Occupation:     Handedness: Right    Exercise: 7 days/week    Review of Systems:    Do you have fever, chills, weight loss? No    Do you have any vision problems? No    Do you have any chest pain or edema? No    Do you have any shortness of breath or wheezing?  No    Do you have stomach problems? Yes, abdominal cramps    Do you have any numbness or focal weakness? Yes, abdominal cramps    Do you have diabetes? Yes, type 2    Do you have problems with bleeding or clotting? No    Do you have an rashes or other skin lesions? Yes, on legs       Large Joint Injection/Arthocentesis  Date/Time: 7/2/2018 4:07 PM  Performed by: BUTCH CEDILLO  Authorized by: BUTCH CEDILLO     Indications:  Pain  Needle Size:  22 G  Guidance: landmark guided    Location:  Knee  Site:  L knee joint  Medications:  4 mL lidocaine (PF) 1 %; 40 mg triamcinolone acetonide 40 MG/ML  Procedure discussed: discussed risks, benefits, and alternatives    Consent Given by:  Patient

## 2018-07-03 ENCOUNTER — HOSPITAL ENCOUNTER (EMERGENCY)
Facility: CLINIC | Age: 53
Discharge: HOME | End: 2018-07-03
Attending: EMERGENCY MEDICINE | Admitting: EMERGENCY MEDICINE
Payer: COMMERCIAL

## 2018-07-03 VITALS
BODY MASS INDEX: 30.05 KG/M2 | SYSTOLIC BLOOD PRESSURE: 131 MMHG | OXYGEN SATURATION: 97 % | HEIGHT: 64 IN | WEIGHT: 176 LBS | DIASTOLIC BLOOD PRESSURE: 92 MMHG | TEMPERATURE: 98.6 F

## 2018-07-03 DIAGNOSIS — E11.9 DIABETES MELLITUS (H): ICD-10-CM

## 2018-07-03 DIAGNOSIS — R11.2 NON-INTRACTABLE VOMITING WITH NAUSEA, UNSPECIFIED VOMITING TYPE: ICD-10-CM

## 2018-07-03 LAB
ALBUMIN SERPL-MCNC: 4.3 G/DL (ref 3.4–5)
ALBUMIN UR-MCNC: NEGATIVE MG/DL
ALP SERPL-CCNC: 124 U/L (ref 40–150)
ALT SERPL W P-5'-P-CCNC: 28 U/L (ref 0–70)
ANION GAP SERPL CALCULATED.3IONS-SCNC: 12 MMOL/L (ref 3–14)
APPEARANCE UR: CLEAR
AST SERPL W P-5'-P-CCNC: 13 U/L (ref 0–45)
BASOPHILS # BLD AUTO: 0 10E9/L (ref 0–0.2)
BASOPHILS NFR BLD AUTO: 0.2 %
BILIRUB SERPL-MCNC: 0.8 MG/DL (ref 0.2–1.3)
BILIRUB UR QL STRIP: NEGATIVE
BUN SERPL-MCNC: 11 MG/DL (ref 7–30)
CALCIUM SERPL-MCNC: 10 MG/DL (ref 8.5–10.1)
CHLORIDE SERPL-SCNC: 102 MMOL/L (ref 94–109)
CO2 SERPL-SCNC: 25 MMOL/L (ref 20–32)
COLOR UR AUTO: ABNORMAL
CREAT SERPL-MCNC: 0.88 MG/DL (ref 0.66–1.25)
DIFFERENTIAL METHOD BLD: ABNORMAL
EOSINOPHIL # BLD AUTO: 0.1 10E9/L (ref 0–0.7)
EOSINOPHIL NFR BLD AUTO: 1.1 %
ERYTHROCYTE [DISTWIDTH] IN BLOOD BY AUTOMATED COUNT: 13.5 % (ref 10–15)
GFR SERPL CREATININE-BSD FRML MDRD: >90 ML/MIN/1.7M2
GLUCOSE BLDC GLUCOMTR-MCNC: 222 MG/DL (ref 70–99)
GLUCOSE SERPL-MCNC: 216 MG/DL (ref 70–99)
GLUCOSE UR STRIP-MCNC: >1000 MG/DL
HCT VFR BLD AUTO: 45.6 % (ref 40–53)
HGB BLD-MCNC: 16 G/DL (ref 13.3–17.7)
HGB UR QL STRIP: NEGATIVE
IMM GRANULOCYTES # BLD: 0.1 10E9/L (ref 0–0.4)
IMM GRANULOCYTES NFR BLD: 0.5 %
KETONES UR STRIP-MCNC: 10 MG/DL
LEUKOCYTE ESTERASE UR QL STRIP: NEGATIVE
LIPASE SERPL-CCNC: 375 U/L (ref 73–393)
LYMPHOCYTES # BLD AUTO: 2.4 10E9/L (ref 0.8–5.3)
LYMPHOCYTES NFR BLD AUTO: 20.6 %
MCH RBC QN AUTO: 31.2 PG (ref 26.5–33)
MCHC RBC AUTO-ENTMCNC: 35.1 G/DL (ref 31.5–36.5)
MCV RBC AUTO: 89 FL (ref 78–100)
MONOCYTES # BLD AUTO: 0.8 10E9/L (ref 0–1.3)
MONOCYTES NFR BLD AUTO: 7.3 %
NEUTROPHILS # BLD AUTO: 8.1 10E9/L (ref 1.6–8.3)
NEUTROPHILS NFR BLD AUTO: 70.3 %
NITRATE UR QL: NEGATIVE
NRBC # BLD AUTO: 0 10*3/UL
NRBC BLD AUTO-RTO: 0 /100
PH UR STRIP: 6.5 PH (ref 5–7)
PLATELET # BLD AUTO: 256 10E9/L (ref 150–450)
POTASSIUM SERPL-SCNC: 3.4 MMOL/L (ref 3.4–5.3)
PROT SERPL-MCNC: 8.3 G/DL (ref 6.8–8.8)
RBC # BLD AUTO: 5.13 10E12/L (ref 4.4–5.9)
RBC #/AREA URNS AUTO: 1 /HPF (ref 0–2)
SODIUM SERPL-SCNC: 139 MMOL/L (ref 133–144)
SOURCE: ABNORMAL
SP GR UR STRIP: 1.03 (ref 1–1.03)
UROBILINOGEN UR STRIP-MCNC: NORMAL MG/DL (ref 0–2)
WBC # BLD AUTO: 11.5 10E9/L (ref 4–11)
WBC #/AREA URNS AUTO: <1 /HPF (ref 0–5)

## 2018-07-03 PROCEDURE — 96374 THER/PROPH/DIAG INJ IV PUSH: CPT

## 2018-07-03 PROCEDURE — 25000132 ZZH RX MED GY IP 250 OP 250 PS 637: Performed by: EMERGENCY MEDICINE

## 2018-07-03 PROCEDURE — 99284 EMERGENCY DEPT VISIT MOD MDM: CPT | Mod: Z6 | Performed by: EMERGENCY MEDICINE

## 2018-07-03 PROCEDURE — 99284 EMERGENCY DEPT VISIT MOD MDM: CPT | Mod: 25

## 2018-07-03 PROCEDURE — 83690 ASSAY OF LIPASE: CPT | Performed by: EMERGENCY MEDICINE

## 2018-07-03 PROCEDURE — 96361 HYDRATE IV INFUSION ADD-ON: CPT | Mod: 59

## 2018-07-03 PROCEDURE — 25000125 ZZHC RX 250: Performed by: EMERGENCY MEDICINE

## 2018-07-03 PROCEDURE — 00000146 ZZHCL STATISTIC GLUCOSE BY METER IP

## 2018-07-03 PROCEDURE — 81001 URINALYSIS AUTO W/SCOPE: CPT | Performed by: EMERGENCY MEDICINE

## 2018-07-03 PROCEDURE — 25000128 H RX IP 250 OP 636: Performed by: EMERGENCY MEDICINE

## 2018-07-03 PROCEDURE — 80053 COMPREHEN METABOLIC PANEL: CPT | Performed by: EMERGENCY MEDICINE

## 2018-07-03 PROCEDURE — 85025 COMPLETE CBC W/AUTO DIFF WBC: CPT | Performed by: EMERGENCY MEDICINE

## 2018-07-03 RX ORDER — ONDANSETRON 4 MG/1
4 TABLET, ORALLY DISINTEGRATING ORAL EVERY 8 HOURS PRN
Qty: 10 TABLET | Refills: 0 | Status: SHIPPED | OUTPATIENT
Start: 2018-07-03 | End: 2018-07-06

## 2018-07-03 RX ORDER — ONDANSETRON 2 MG/ML
4 INJECTION INTRAMUSCULAR; INTRAVENOUS EVERY 30 MIN PRN
Status: DISCONTINUED | OUTPATIENT
Start: 2018-07-03 | End: 2018-07-03 | Stop reason: HOSPADM

## 2018-07-03 RX ADMIN — LIDOCAINE HYDROCHLORIDE 30 ML: 20 SOLUTION ORAL; TOPICAL at 09:41

## 2018-07-03 RX ADMIN — ONDANSETRON 4 MG: 2 INJECTION INTRAMUSCULAR; INTRAVENOUS at 09:40

## 2018-07-03 RX ADMIN — SODIUM CHLORIDE, POTASSIUM CHLORIDE, SODIUM LACTATE AND CALCIUM CHLORIDE 1000 ML: 600; 310; 30; 20 INJECTION, SOLUTION INTRAVENOUS at 09:38

## 2018-07-03 ASSESSMENT — ENCOUNTER SYMPTOMS
NECK STIFFNESS: 0
VOMITING: 1
LIGHT-HEADEDNESS: 0
AGITATION: 0
FEVER: 0
BLOOD IN STOOL: 0
FLANK PAIN: 0
ABDOMINAL PAIN: 1
CHILLS: 0
COLOR CHANGE: 0
HEADACHES: 0
POLYDIPSIA: 0
COUGH: 0
ADENOPATHY: 0
DIFFICULTY URINATING: 0
CONSTIPATION: 0
NECK PAIN: 0
BRUISES/BLEEDS EASILY: 0
DIARRHEA: 0
BACK PAIN: 0
NAUSEA: 1
SHORTNESS OF BREATH: 0

## 2018-07-03 NOTE — ED TRIAGE NOTES
Pt brought in by friend with c/o of new onset abd pain with severe N & V that started last night around 2am. Pt lives in his car and denies taking any medications for nausea or pain. Pt denies any alcohol or drug use. Pt is HIV positive and reports he is current on his medications. Denies fevers. Abd tender.

## 2018-07-03 NOTE — ED AVS SNAPSHOT
" [facility], Emergency Department    [address] [facility] [address]    Phone:  [phone]                                       Andrew Lockwood   MRN: [id number]    Department:  [facility], Emergency Department   Date of Visit:  7/3/2018           Patient Information     Date Of Birth          11/27/1967        Your diagnoses for this visit were:     Non-intractable vomiting with nausea, unspecified vomiting type        You were seen by Braulio Wayne MD.        Discharge Instructions       Please make an appointment to follow up with Your Primary Care Provider in 2-3 days unless symptoms completely resolve.        *VOMITING [6yr-Adult]  Vomiting is a common symptom that may be due to different causes. These include gastroenteritis (\"stomach-flu\"), food poisoning and gastritis. There are other more serious causes of vomiting which may be hard to diagnose early in the illness. Therefore, it is important to watch for the warning signs listed below.  The main danger from repeated vomiting is \"dehydration\". This is due to excess loss of water and minerals from the body. When this occurs, body fluids must be replaced.  HOME CARE:  1) If symptoms are severe, rest at home for the next 24 hours.  2) You may use acetaminophen (Tylenol) 650-1000 mg every 6 hours to control fever, unless another medicine was prescribed. [ NOTE : If you have chronic liver disease, talk with your doctor before using acetaminophen.] (Aspirin should never be used in anyone under 18 years of age who is ill with a fever. It may cause severe liver damage.)  3) Avoid tobacco and alcohol use, which may worsen your symptoms.  4) If medicines for vomiting were prescribed, take as directed.   DURING THE FIRST 12-24 HOURS follow the diet below. Try to take frequent small sips even if you vomit occasionally:    FRUIT JUICES: Apple, grape juice, clear fruit drinks, electrolyte replacement and sports drinks.    BEVERAGES: Sport drinks such as " Gatorade, soft drinks without caffeine; mineral water (plain or flavored), decaffeinated tea and coffee.    SOUPS: Clear broth, consommé and bouillon    DESSERTS: Plain gelatin (Jell-O), popsicles and fruit juice bars.  DURING THE NEXT 24 HOURS you may add the following to the above:    Hot cereal, plain toast, bread, rolls, crackers    Plain noodles, rice, mashed potatoes, chicken noodle or rice soup    Unsweetened canned fruit (avoid pineapple), bananas    Avoid dairy products     Limit caffeine and chocolate. No spices or seasonings except salt.   DURING THE NEXT 24 HOURS  Gradually resume a normal diet, as you feel better and your symptoms lessen.  FOLLOW UP with your doctor as advised if you are not improving over the next 2-3 days.  GET PROMPT MEDICAL ATTENTION if any of the following occur:  -- Constant abdominal pain that stays in the same spot or gets worse  -- Continued vomiting (unable to keep liquids down) for 24 hours  -- Frequent diarrhea (more than 5 times a day); blood (red or black color) in diarrhea  -- No urine output for 12 hours or extreme thirst  -- Weakness, dizziness or fainting  -- Unusually drowsy or confused  -- Fever over 101.0  F (38.3  C) for more than 3 days  -- Yellow color of the eyes or skin    9536-8930 [employer], [address] [place] [address]. All rights reserved. This information is not intended as a substitute for professional medical care. Always follow your healthcare professional's instructions.        Your next 10 appointments already scheduled     Jul 19, 2018  9:30 AM CDT   (Arrive by 9:15 AM)   Return Visit with Sharon Rosado MD   [facility] and Infectious Diseases ([facility])    [address] [place] [address]  [address]  [facility] [address] [phone]            Aug 07, 2018 11:00 AM CDT   (Arrive by 10:45 AM)   RETURN DIABETES with Lenora Haley PA-C   [facility] ([facility])    [address] [facility]  3rd [facility] [address] [phone]              24 Hour Appointment Hotline       To make an appointment at any [facility], call [phone] ([phone]). If you don't have a family doctor or clinic, we will help you find one. [place] clinics are conveniently located to serve the needs of you and your family.             Review of your medicines      START taking        Dose / Directions Last dose taken    ondansetron 4 MG ODT tab   Commonly known as:  ZOFRAN ODT   Dose:  4 mg   Quantity:  10 tablet        Take 1 tablet (4 mg) by mouth every 8 hours as needed   Refills:  0          Our records show that you are taking the medicines listed below. If these are incorrect, please call your family doctor or clinic.        Dose / Directions Last dose taken    * BASAGLAR 100 UNIT/ML injection   Quantity:  15 mL        Inject 7 units once daily subcutaneously   Refills:  3        * LANTUS SOLOSTAR 100 UNIT/ML injection   Quantity:  15 mL   Generic drug:  insulin glargine        Take 14 units each morning   Refills:  3        blood glucose lancets standard   Commonly known as:  no brand specified   Quantity:  100 Box        Use to test blood sugar four times daily or as directed.   Refills:  5        blood glucose monitoring lancets   Quantity:  100 each        Use to test blood sugar 2 times daily or as directed.  Ok to substitute alternative if insurance prefers.   Refills:  11        * blood glucose monitoring meter device kit   Commonly known as:  no brand specified   Quantity:  1 kit        Use to test blood sugar four times daily or as directed.   Refills:  0        * blood glucose monitoring meter device kit   Commonly known as:  no brand specified   Quantity:  1 kit        Use to test blood sugar 4 times daily or as directed.   Refills:  0        * blood glucose monitoring test strip   Commonly known as:  no brand specified   Quantity:   100 each        Use to test blood sugars four times daily or as directed   Refills:  5        * blood glucose monitoring test strip   Commonly known as:  no brand specified   Dose:  1 strip   Quantity:  100 strip        1 strip by In Vitro route 4 times daily (before meals and nightly) Use to test blood sugars 4 times daily or as directed   Refills:  11        * blood glucose monitoring test strip   Commonly known as:  GELACIO CONTOUR NEXT   Quantity:  100 strip        Use to test blood sugar 3 times daily or as directed.  Ok to substitute alternative if insurance prefers.   Refills:  11        Nbjejsf-Mlebr-Rjomxuye-TenofAF [phone] MG Tabs per tablet   Commonly known as:  GENVOYA   Dose:  1 tablet   Indication:  Positive HIV Test   Quantity:  30 tablet        Take 1 tablet by mouth daily (with breakfast)   Refills:  0        empagliflozin 10 MG Tabs tablet   Commonly known as:  JARDIANCE   Dose:  10 mg   Quantity:  30 tablet        Take 1 tablet (10 mg) by mouth daily   Refills:  1        * insulin pen needle 31G X 8 MM   Commonly known as:  B-D U/F   Quantity:  100 each        Use 1 pen needles daily or as directed.   Refills:  0        * insulin pen needle 32G X 4 MM   Commonly known as:  BD SCOTT U/F   Quantity:  100 each        Use 1 daily as directed.   Refills:  11        polyethylene glycol powder   Commonly known as:  MIRALAX   Dose:  1 capful   Quantity:  225 g        Take 17 g (1 capful) by mouth daily   Refills:  0        povidone-iodine 7.5 % Soln topical solution   Commonly known as:  APLICARE POVIDONE-IODINE SCRUB   Quantity:  473 mL        Wash extremities 2-3 times per week and let 5min on skin before rinsing  Put on legs and arms 1-2 times per day Cetaphil Lotio   Refills:  3        PRILOSEC PO   Dose:  10 mg        Take 10 mg by mouth daily   Refills:  0        triamcinolone 0.1 % cream   Commonly known as:  KENALOG   Quantity:  30 g        Apply topically every evening Put on prurigo lesions  for 5 days maximum   Refills:  3        * Notice:  This list has 9 medication(s) that are the same as other medications prescribed for you. Read the directions carefully, and ask your doctor or other care provider to review them with you.            Prescriptions were sent or printed at these locations (1 Prescription)                   Other Prescriptions                Printed at Department/Unit printer (1 of 1)         ondansetron (ZOFRAN ODT) 4 MG ODT tab                Procedures and tests performed during your visit     CBC with platelets differential    Comprehensive metabolic panel    Glucose by meter    Lipase    Peripheral IV catheter    UA with Microscopic      Orders Needing Specimen Collection     None      Pending Results     No orders found from 7/1/2018 to 7/4/2018.            Pending Culture Results     No orders found from 7/1/2018 to 7/4/2018.            Pending Results Instructions     If you had any lab results that were not finalized at the time of your Discharge, you can call the ED Lab Result RN at [phone]. You will be contacted by this team for any positive Lab results or changes in treatment. The nurses are available 7 days a week from 10A to 6:30P.  You can leave a message 24 hours per day and they will return your call.        Thank you for choosing [facility]       Thank you for choosing [facility] for your care. Our goal is always to provide you with excellent care. Hearing back from our patients is one way we can continue to improve our services. Please take a few minutes to complete the written survey that you may receive in the mail after you visit with us. Thank you!        [employer] Information     [employer] gives you secure access to your electronic health record. If you see a primary care provider, you can also send messages to your care team and make appointments. If you have questions, please call your primary care clinic.  If you do not have a primary care provider, please call  [phone] and they will assist you.        Care EveryWhere ID     This is your Care EveryWhere ID. This could be used by other organizations to access your [place] medical records  [id number]        Equal Access to Services     RA JORDAN : Apollo Cody, maverick centeno, qaiker kaalmaadeline reyna, dez salazar. So [facility] [phone].    ATENCIÓN: Si habla español, tiene a kohli disposición servicios gratuitos de asistencia lingüística. Llame al [phone].    We comply with applicable federal civil rights laws and [place] laws. We do not discriminate on the basis of race, color, national origin, age, disability, sex, sexual orientation, or gender identity.            After Visit Summary       This is your record. Keep this with you and show to your community pharmacist(s) and doctor(s) at your next visit.

## 2018-07-03 NOTE — ED PROVIDER NOTES
History     Chief Complaint   Patient presents with     Abdominal Pain     Nausea, Vomiting, & Diarrhea     HPI  Andrew Lockwood is a 50 year old male history notable for HIV, chronic abdominal pain, DM 2, and appendectomy presents the ED with mild, burning, constant, non-radiating upper, mid-abdominal pain and non-bilious, non-bloody vomiting.  Nothing makes his symptoms better or worse.  He denies fevers.  Last CD4 count was 280.  He denies any headache, chest pain, shortness of breath, or diarrhea.  Per review of his chart, patient was last seen in the ED on 6/19/18 for crampy abdominal discomfort.  His exam was benign, so labs were not obtained.  He was treated with Zofran and discharged with a prescription for MiraLAX.     Today, the patient reports that he woke up at 3 AM, about 6 hours ago, with mid abdominal pain and vomiting. He states that he has been having his pain and vomiting since. The patient reports that he has been taking all of his prescription medications. He denies having any fevers, or trouble urinating. He has not taking anything to treat his pain or nausea. The patient denies the use of alcohol or drugs.     I have reviewed the Medications, Allergies, Past Medical and Surgical History, and Social History in the Epic system.    Review of Systems   Constitutional: Negative for chills and fever.   HENT: Negative for congestion.    Eyes: Negative for visual disturbance.   Respiratory: Negative for cough and shortness of breath.    Cardiovascular: Negative for chest pain.   Gastrointestinal: Positive for abdominal pain, nausea and vomiting. Negative for blood in stool, constipation and diarrhea.   Endocrine: Negative for polydipsia and polyuria.   Genitourinary: Negative for difficulty urinating and flank pain.   Musculoskeletal: Negative for back pain, neck pain and neck stiffness.   Skin: Negative for color change.   Allergic/Immunologic: Positive for immunocompromised state.   Neurological:  "Negative for light-headedness and headaches.   Hematological: Negative for adenopathy. Does not bruise/bleed easily.   Psychiatric/Behavioral: Negative for agitation and behavioral problems.     Physical Exam   BP: (!) 160/102  Heart Rate: 95  Temp: 98.6  F (37  C)  Height: 162.6 cm (5' 4\")  Weight: 79.8 kg (176 lb)  SpO2: 100 %   ED Triage Vitals   Enc Vitals Group      BP 07/03/18 0900 160/102      Pulse --       Resp --       Temp 07/03/18 0900 98.6  F (37  C)      Temp src 07/03/18 0900 Oral      SpO2 07/03/18 0907 100 %      Weight 07/03/18 0900 79.8 kg (176 lb)      Height 07/03/18 0900 1.626 m (5' 4\")      Head Cir --       Peak Flow --       Pain Score --       Pain Loc --       Pain Edu? --       Excl. in GC? --      Physical Exam   Constitutional: He is oriented to person, place, and time. He appears well-developed and well-nourished. No distress.   HENT:   Head: Normocephalic and atraumatic.   Mouth/Throat: Oropharynx is clear and moist. No oropharyngeal exudate.   Eyes: Conjunctivae and EOM are normal. Pupils are equal, round, and reactive to light. No scleral icterus.   Neck: Normal range of motion. Neck supple.   Cardiovascular: Normal rate, normal heart sounds and intact distal pulses.    Pulmonary/Chest: Effort normal and breath sounds normal. No respiratory distress. He has no wheezes. He has no rales.   Abdominal: Soft. Bowel sounds are normal. He exhibits no distension and no mass. There is no tenderness. There is no rebound and no guarding.   Musculoskeletal: Normal range of motion. He exhibits no edema or tenderness.   Neurological: He is alert and oriented to person, place, and time. No cranial nerve deficit. He exhibits normal muscle tone. Coordination normal.   Skin: Skin is warm. No rash noted. He is not diaphoretic. No erythema.   Psychiatric: He has a normal mood and affect. His behavior is normal. Judgment and thought content normal.   Nursing note and vitals reviewed.    ED Course   9:03 " AM  The patient was seen and examined by Dr. Wayne in Room 19.    ED Course     Procedures             Critical Care time:  none             Labs Ordered and Resulted from Time of ED Arrival Up to the Time of Departure from the ED   CBC WITH PLATELETS DIFFERENTIAL - Abnormal; Notable for the following:        Result Value    WBC 11.5 (*)     All other components within normal limits   COMPREHENSIVE METABOLIC PANEL - Abnormal; Notable for the following:     Glucose 216 (*)     All other components within normal limits   ROUTINE UA WITH MICROSCOPIC - Abnormal; Notable for the following:     Glucose Urine >1000 (*)     Ketones Urine 10 (*)     All other components within normal limits   GLUCOSE BY METER - Abnormal; Notable for the following:     Glucose 222 (*)     All other components within normal limits   LIPASE   PERIPHERAL IV CATHETER            Assessments & Plan (with Medical Decision Making)   50-year-old man presenting with nonbloody, nonbilious nausea and vomiting since last night.  Differential diagnosis: Gastritis, acute pancreatitis, dehydration, electrolyte abnormalities, unlikely SBO or volvulus.    After thorough history and physical exam, patient appears to be in no acute distress.  I will treat his nausea with IV Zofran and subsequent treatment with GI cocktail.  Will obtain laboratory studies for further diagnostic evaluation.  Patient has a benign abdomen on examination and I do not believe that he needs advanced imaging at this time.  He agrees with plan.    Laboratory studies returned with mild leukocytosis of 11,500.  There is no anemia; hemoglobin is normal at 16.0.  Electrolytes show no evidence of dehydration; creatinine is normal 0.88.  LFTs and lipase are normal.  Glucose is slightly elevated 216.  Urinalysis shows no evidence of infection.  Patient symptoms have improved significantly improved in the Emergency Department after treatment.  On reexamination, his abdomen is soft, nontender,  and  nondistended.  At this point, he is stable for discharge with prescription for ODT Zofran.  He agrees to follow-up with his primary care or infectious disease clinic and return if his symptoms worsen.    This part of the medical record was transcribed by Minh Boss, Medical Scribe, from a dictation done by Braulio Wayne MD.      I have reviewed the nursing notes.    I have reviewed the findings, diagnosis, plan and need for follow up with the patient.  Discharge Medication List as of 7/3/2018 11:38 AM      START taking these medications    Details   ondansetron (ZOFRAN ODT) 4 MG ODT tab Take 1 tablet (4 mg) by mouth every 8 hours as needed, Disp-10 tablet, R-0, Local Print             Final diagnoses:   Non-intractable vomiting with nausea, unspecified vomiting type   I, Christiano Velazquez, am serving as a trained medical scribe to document services personally performed by Braulio Wayne MD, based on the provider's statements to me.   I, Braulio Wayne MD, was physically present and have reviewed and verified the accuracy of this note documented by Christiano Velazquez.     7/3/2018   [facility], [place], EMERGENCY DEPARTMENT     Braulio Wayne MD  07/03/18 3641

## 2018-07-03 NOTE — DISCHARGE INSTRUCTIONS
"Please make an appointment to follow up with Your Primary Care Provider in 2-3 days unless symptoms completely resolve.        *VOMITING [6yr-Adult]  Vomiting is a common symptom that may be due to different causes. These include gastroenteritis (\"stomach-flu\"), food poisoning and gastritis. There are other more serious causes of vomiting which may be hard to diagnose early in the illness. Therefore, it is important to watch for the warning signs listed below.  The main danger from repeated vomiting is \"dehydration\". This is due to excess loss of water and minerals from the body. When this occurs, body fluids must be replaced.  HOME CARE:  1) If symptoms are severe, rest at home for the next 24 hours.  2) You may use acetaminophen (Tylenol) 650-1000 mg every 6 hours to control fever, unless another medicine was prescribed. [ NOTE : If you have chronic liver disease, talk with your doctor before using acetaminophen.] (Aspirin should never be used in anyone under 18 years of age who is ill with a fever. It may cause severe liver damage.)  3) Avoid tobacco and alcohol use, which may worsen your symptoms.  4) If medicines for vomiting were prescribed, take as directed.   DURING THE FIRST 12-24 HOURS follow the diet below. Try to take frequent small sips even if you vomit occasionally:    FRUIT JUICES: Apple, grape juice, clear fruit drinks, electrolyte replacement and sports drinks.    BEVERAGES: Sport drinks such as Gatorade, soft drinks without caffeine; mineral water (plain or flavored), decaffeinated tea and coffee.    SOUPS: Clear broth, consommé and bouillon    DESSERTS: Plain gelatin (Jell-O), popsicles and fruit juice bars.  DURING THE NEXT 24 HOURS you may add the following to the above:    Hot cereal, plain toast, bread, rolls, crackers    Plain noodles, rice, mashed potatoes, chicken noodle or rice soup    Unsweetened canned fruit (avoid pineapple), bananas    Avoid dairy products     Limit caffeine and " chocolate. No spices or seasonings except salt.   DURING THE NEXT 24 HOURS  Gradually resume a normal diet, as you feel better and your symptoms lessen.  FOLLOW UP with your doctor as advised if you are not improving over the next 2-3 days.  GET PROMPT MEDICAL ATTENTION if any of the following occur:  -- Constant abdominal pain that stays in the same spot or gets worse  -- Continued vomiting (unable to keep liquids down) for 24 hours  -- Frequent diarrhea (more than 5 times a day); blood (red or black color) in diarrhea  -- No urine output for 12 hours or extreme thirst  -- Weakness, dizziness or fainting  -- Unusually drowsy or confused  -- Fever over 101.0  F (38.3  C) for more than 3 days  -- Yellow color of the eyes or skin    2173-9815 [employer], [address] [place] [address], [place] [address]. All rights reserved. This information is not intended as a substitute for professional medical care. Always follow your healthcare professional's instructions.

## 2018-07-03 NOTE — ED AVS SNAPSHOT
[facility], [place], Emergency Department    [address] [place] [address]    Phone:  [phone]                                       Andrew Lockwood   MRN: [id number]    Department:  [facility], Emergency Department   Date of Visit:  7/3/2018           After Visit Summary Signature Page     I have received my discharge instructions, and my questions have been answered. I have discussed any challenges I see with this plan with the nurse or doctor.    ..........................................................................................................................................  Patient/Patient Representative Signature      ..........................................................................................................................................  Patient Representative Print Name and Relationship to Patient    ..................................................               ................................................  Date                                            Time    ..........................................................................................................................................  Reviewed by Signature/Title    ...................................................              ..............................................  Date                                                            Time

## 2018-07-04 ENCOUNTER — APPOINTMENT (OUTPATIENT)
Dept: CT IMAGING | Facility: CLINIC | Age: 53
End: 2018-07-04
Attending: EMERGENCY MEDICINE
Payer: COMMERCIAL

## 2018-07-04 ENCOUNTER — HOSPITAL ENCOUNTER (EMERGENCY)
Facility: CLINIC | Age: 53
Discharge: HOME | End: 2018-07-04
Attending: EMERGENCY MEDICINE | Admitting: EMERGENCY MEDICINE
Payer: COMMERCIAL

## 2018-07-04 VITALS
DIASTOLIC BLOOD PRESSURE: 75 MMHG | HEIGHT: 64 IN | WEIGHT: 176 LBS | BODY MASS INDEX: 30.05 KG/M2 | HEART RATE: 72 BPM | SYSTOLIC BLOOD PRESSURE: 103 MMHG | TEMPERATURE: 98.7 F | RESPIRATION RATE: 16 BRPM | OXYGEN SATURATION: 96 %

## 2018-07-04 DIAGNOSIS — R11.2 NAUSEA AND VOMITING, INTRACTABILITY OF VOMITING NOT SPECIFIED, UNSPECIFIED VOMITING TYPE: ICD-10-CM

## 2018-07-04 DIAGNOSIS — R19.7 DIARRHEA, UNSPECIFIED TYPE: ICD-10-CM

## 2018-07-04 DIAGNOSIS — R10.84 ABDOMINAL PAIN, GENERALIZED: ICD-10-CM

## 2018-07-04 DIAGNOSIS — B20 HUMAN IMMUNODEFICIENCY VIRUS (HIV) DISEASE (H): ICD-10-CM

## 2018-07-04 LAB
ALBUMIN SERPL-MCNC: 3.4 G/DL (ref 3.4–5)
ALP SERPL-CCNC: 107 U/L (ref 40–150)
ALT SERPL W P-5'-P-CCNC: 23 U/L (ref 0–70)
ANION GAP SERPL CALCULATED.3IONS-SCNC: 9 MMOL/L (ref 3–14)
AST SERPL W P-5'-P-CCNC: 27 U/L (ref 0–45)
BASOPHILS # BLD AUTO: 0 10E9/L (ref 0–0.2)
BASOPHILS NFR BLD AUTO: 0.3 %
BILIRUB SERPL-MCNC: 0.9 MG/DL (ref 0.2–1.3)
BUN SERPL-MCNC: 16 MG/DL (ref 7–30)
CALCIUM SERPL-MCNC: 9.5 MG/DL (ref 8.5–10.1)
CHLORIDE SERPL-SCNC: 101 MMOL/L (ref 94–109)
CO2 SERPL-SCNC: 24 MMOL/L (ref 20–32)
CREAT SERPL-MCNC: 0.77 MG/DL (ref 0.66–1.25)
DIFFERENTIAL METHOD BLD: NORMAL
EOSINOPHIL # BLD AUTO: 0.3 10E9/L (ref 0–0.7)
EOSINOPHIL NFR BLD AUTO: 3 %
ERYTHROCYTE [DISTWIDTH] IN BLOOD BY AUTOMATED COUNT: 13.4 % (ref 10–15)
GFR SERPL CREATININE-BSD FRML MDRD: >90 ML/MIN/1.7M2
GLUCOSE SERPL-MCNC: 210 MG/DL (ref 70–99)
HCT VFR BLD AUTO: 42.7 % (ref 40–53)
HGB BLD-MCNC: 14.7 G/DL (ref 13.3–17.7)
IMM GRANULOCYTES # BLD: 0 10E9/L (ref 0–0.4)
IMM GRANULOCYTES NFR BLD: 0.4 %
LIPASE SERPL-CCNC: 192 U/L (ref 73–393)
LYMPHOCYTES # BLD AUTO: 2.5 10E9/L (ref 0.8–5.3)
LYMPHOCYTES NFR BLD AUTO: 26.7 %
MCH RBC QN AUTO: 30.2 PG (ref 26.5–33)
MCHC RBC AUTO-ENTMCNC: 34.4 G/DL (ref 31.5–36.5)
MCV RBC AUTO: 88 FL (ref 78–100)
MONOCYTES # BLD AUTO: 0.6 10E9/L (ref 0–1.3)
MONOCYTES NFR BLD AUTO: 6.6 %
NEUTROPHILS # BLD AUTO: 6 10E9/L (ref 1.6–8.3)
NEUTROPHILS NFR BLD AUTO: 63 %
NRBC # BLD AUTO: 0 10*3/UL
NRBC BLD AUTO-RTO: 0 /100
PLATELET # BLD AUTO: 229 10E9/L (ref 150–450)
POTASSIUM SERPL-SCNC: 4.3 MMOL/L (ref 3.4–5.3)
PROT SERPL-MCNC: 7.1 G/DL (ref 6.8–8.8)
RBC # BLD AUTO: 4.86 10E12/L (ref 4.4–5.9)
SODIUM SERPL-SCNC: 134 MMOL/L (ref 133–144)
WBC # BLD AUTO: 9.5 10E9/L (ref 4–11)

## 2018-07-04 PROCEDURE — 96361 HYDRATE IV INFUSION ADD-ON: CPT

## 2018-07-04 PROCEDURE — 25000132 ZZH RX MED GY IP 250 OP 250 PS 637: Performed by: EMERGENCY MEDICINE

## 2018-07-04 PROCEDURE — 25000128 H RX IP 250 OP 636: Performed by: EMERGENCY MEDICINE

## 2018-07-04 PROCEDURE — 80053 COMPREHEN METABOLIC PANEL: CPT | Performed by: EMERGENCY MEDICINE

## 2018-07-04 PROCEDURE — 85025 COMPLETE CBC W/AUTO DIFF WBC: CPT | Performed by: EMERGENCY MEDICINE

## 2018-07-04 PROCEDURE — 96360 HYDRATION IV INFUSION INIT: CPT

## 2018-07-04 PROCEDURE — 99285 EMERGENCY DEPT VISIT HI MDM: CPT | Mod: 25

## 2018-07-04 PROCEDURE — 25000125 ZZHC RX 250: Performed by: EMERGENCY MEDICINE

## 2018-07-04 PROCEDURE — 74177 CT ABD & PELVIS W/CONTRAST: CPT

## 2018-07-04 PROCEDURE — 83690 ASSAY OF LIPASE: CPT | Performed by: EMERGENCY MEDICINE

## 2018-07-04 PROCEDURE — 99284 EMERGENCY DEPT VISIT MOD MDM: CPT | Mod: Z6 | Performed by: EMERGENCY MEDICINE

## 2018-07-04 RX ORDER — IOPAMIDOL 755 MG/ML
108 INJECTION, SOLUTION INTRAVASCULAR ONCE
Status: COMPLETED | OUTPATIENT
Start: 2018-07-04 | End: 2018-07-04

## 2018-07-04 RX ADMIN — SODIUM CHLORIDE 1000 ML: 9 INJECTION, SOLUTION INTRAVENOUS at 05:27

## 2018-07-04 RX ADMIN — LIDOCAINE HYDROCHLORIDE 30 ML: 20 SOLUTION ORAL; TOPICAL at 05:27

## 2018-07-04 RX ADMIN — SODIUM CHLORIDE, PRESERVATIVE FREE 77 ML: 5 INJECTION INTRAVENOUS at 07:18

## 2018-07-04 RX ADMIN — IOPAMIDOL 108 ML: 755 INJECTION, SOLUTION INTRAVENOUS at 07:16

## 2018-07-04 ASSESSMENT — ENCOUNTER SYMPTOMS
SHORTNESS OF BREATH: 0
FEVER: 0
NAUSEA: 1
VOMITING: 1
ABDOMINAL PAIN: 1
DIARRHEA: 1

## 2018-07-04 NOTE — DISCHARGE INSTRUCTIONS
Follow up with your doctor tomorrow, as planned. Return with concerns.  The surgeons are not concerned about your CT scan

## 2018-07-04 NOTE — ED NOTES
12:33 PM patient was signed out to me pending CT results.  The preliminary report was dilated loop of bowel.  This is similar to that seen previously but actually somewhat better and at that time he had an exploratory laparoscopy.  Surgery was consulted and saw the patient, see their note, they are not concerned about the appearance of this loop of bowel.  The patient will be given reassurance and discharged home.     Thanh Lehman MD  07/04/18 2266

## 2018-07-04 NOTE — PROGRESS NOTES
Social Work: Assessment with Discharge Plan    Patient Name:  Andrew Lockwood  :  1967  Age:  50 year old  MRN:  [id number]  Risk/Complexity Score:     Completed assessment with:  Chart review, ED MD, ED RN, patient    Presenting Information   Reason for Referral:  Potential need for community services upon discharge  Date of Intake:  2018  Referral Source:  Chart Review  Decision Maker:  patient  Alternate Decision Maker:  Friend Catracho Melgoza (p) [phone], patient notes this is a close and trusted friend. Patient also notes he completed HCD and thought [facility] copy.  Health Care Directive:  Will bring in copy  Living Situation:  Homeless  Previous Functional Status:  Independent  Patient and family understanding of hospitalization:  Abdominal pain  Cultural/Language/Spiritual Considerations:  Patient primary language is Chinese, but speaks fluent English  Adjustment to Illness:  Appears comfortable, conversant with SW although he endorses 'very much pain'.    Physical Health  Reason for Admission:    1. Nausea and vomiting, intractability of vomiting not specified, unspecified vomiting type    2. Diarrhea, unspecified type    3. Abdominal pain, generalized    4. Human immunodeficiency virus (HIV) disease (H)      Services Needed/Recommended:  Home with no services    Mental Health/Chemical Dependency  Diagnosis:  None per patient  Support/Services in Place:  none  Services Needed/Recommended:  none    Support System  Significant relationship at present time:  Friend Catracho  Family of origin is available for support:  no  Other support available:   and clinic   Gaps in support system:  Homeless and no other family/friends noted for support  Patient is caregiver to:  None     Provider Information   Primary Care Physician:  Rubio Cronin   [phone]   Clinic:  [address] / [facility] 5*      :  Per patient, he goes  to  [facility] [phone]    Financial   Income Source:  Not discussed  Financial Concerns:  Awaiting housing  Insurance:    Payor/Plan Subscriber Name Rel Member # Group #   BCBS - BCBS OF [place]  [id number]      [address] [address]   BLUE PLUS - BLUE PLUS BREE LOCKWOOD  [id number]      [address] [address]       Discharge Plan   Patient and family discharge goal:  Homeless, living out of car with his friend  Provided education on discharge plan:  YES  Patient agreeable to discharge plan:  YES  A list of Medicare Certified Facilities was provided to the patient and/or family to encourage patient choice. Patient's choices for facility are:  Not applicable at time of assessment.   Will NH provide Skilled rehabilitation or complex medical:  Not applicable at time of assessment.   General information regarding anticipated insurance coverage and possible out of pocket cost was discussed. Patient and patient's family are aware patient may incur the cost of transportation to the facility, pending insurance payment: YES  Barriers to discharge:  TBD - Pending patient's progress and further recommendation.    Discharge Recommendations   Anticipated Disposition:  homeless  Transportation Needs:  Other:  friend Catracho  Name of Transportation Company and Phone:  -    Additional comments   SW consulted via chart review given patient followed by PCC and ID SW as well as a . Per chart review, patient Bree has been followed by clinic SW, community  as well as assessed by ED and inpatient SW. He awaiting surgical consult at this time so final plan to be determined, d/c from ED vs hospitalization.     Bree Lockwood is a 50 year old male history of HIV, diabetes, amongst others, who presents to the ER with a complaint of diarrhea and abdominal pain. He is chronically homeless and recently has been sleeping in his car to avoid going to shelters. He and his  close friend Catracho share the car and stay together. Andrew is aware of his clinic appointments and intends to follow-up. He also notes he no longer has MAP  but instead goes through  [facility]. Andrew confirms he is well aware of community resources and working with his community contacts to locate housing.     Plan: TBD - Pending patient's progress and further recommendation. Once medically stable, anticipate Andrew will d/c back to the streets/staying in car with his friend and outpatient follow-up (PCC & ID clinics). Andrew confirms he has a working cell phone [phone]. He denies any other  needs at this time but aware of our avail if his needs change.    Contact - friend Catracho Melgoza (p) [phone].    JOMAR Hua, [facility]  Social Work Services, Emergency Dept [facility]  Pager: [phone] Mon-Sat 9 am - 9 pm, on-call/after hours pager [phone]

## 2018-07-04 NOTE — ED TRIAGE NOTES
Patient presents to triage c/o abdominal cramping since he was discharged from this ED yesterday. Also reports he started having diarrhea at 6 PM.

## 2018-07-04 NOTE — ED NOTES
Received report and assumed care of patient.  Patient currently in radiology receiving abdominal CT.

## 2018-07-04 NOTE — LETTER
Transition Communication Hand-off for Care Transitions to Next Level of Care Provider    Name: Andrew Lockwood  : 1967  MRN #: [id number]  Primary Care Provider: Rubio Cronin     Primary Clinic: [address] [facility] [address]     Reason for Hospitalization:  ED visit due to diarrhea and abdominal pain  Admit Date/Time: 2018  3:48 AM  Discharge Date: 2018  Payor Source: Payor: BCBS / Plan: [place]BS [place] / Product Type: Indemnity /         Reason for Communication Hand-off Referral: Multiple providers/specialties    Discharge Plan:  FYI Jam Morales came to the [facility] ED for diarrhea and abdominal pain. Andrew was monitored and CT showed dilated loop of bowel. He was seen by surgery in the ED. He was medically cleared to d/c from the ED. Andrew continues to be homeless, living out of his car but reports he is working with various community resources/supports and well aware of his options. We confirmed his upcoming appointments and he confirmed he does have a working cell phone #[phone].     Follow-up plan:  Future Appointments  Date Time Provider Department Center   2018 10:15 AM Rekha Membreno MD [facility]   2018 9:30 AM Sharon Rosado MD [facility]   2018 11:00 AM Lenora Haley PA-C [facility]       Eloina Ramon, JOMAR, [facility]  Social Work Services, Emergency Dept [facility]  Pager: [phone] Mon-Sat 9 am - 9 pm, on-call/after hours pager [phone]    AVS/Discharge Summary is the source of truth; this is a helpful guide for improved communication of patient story

## 2018-07-04 NOTE — ED AVS SNAPSHOT
[facility], [place], Emergency Department    [address] [place] [address]    Phone:  [phone]                                       Andrew Lockwood   MRN: [id number]    Department:  [facility], Emergency Department   Date of Visit:  7/4/2018           After Visit Summary Signature Page     I have received my discharge instructions, and my questions have been answered. I have discussed any challenges I see with this plan with the nurse or doctor.    ..........................................................................................................................................  Patient/Patient Representative Signature      ..........................................................................................................................................  Patient Representative Print Name and Relationship to Patient    ..................................................               ................................................  Date                                            Time    ..........................................................................................................................................  Reviewed by Signature/Title    ...................................................              ..............................................  Date                                                            Time

## 2018-07-04 NOTE — ED PROVIDER NOTES
History     Chief Complaint   Patient presents with     Abdominal Pain     Diarrhea     HPI  Andrew Lockwood is a 50 year old male with a history of HIV, diabetes, amongst others, who presents to the ER with a complaint of diarrhea and abdominal pain.  Symptoms began about 1 day ago.  He initially had about 10 episodes of nonbloody emesis, was seen in the ER, with a negative evaluation.  He was given antiemetic and discharged home.  He states he continued to have some crampy diffuse abdominal pain throughout the day, and this evening he has had 6 episodes of nonbloody diarrhea.  No vomiting since leaving the ED.  No fevers.  He continues to have some diffuse crampy abdominal pain.  No cough or shortness of breath.  No dysuria or hematuria.  He states he has been compliant with all of his medications including his HIV medications.  He denies recent antibiotic use.    Past Medical History:   Diagnosis Date     AIDS (H)      Allergic rhinitis due to other allergen     DNS     Chronic abdominal pain      CNS toxoplasmosis (H)      Diabetes type 2, controlled (H)      GERD (gastroesophageal reflux disease)      HIV (human immunodeficiency virus infection) (H)      Periungual wart      Sleep apnea     doesn't use CPAP       Past Surgical History:   Procedure Laterality Date     C NONSPECIFIC PROCEDURE      right forearm fracture     COLONOSCOPY Left 1/22/2016    Procedure: COMBINED COLONOSCOPY, SINGLE OR MULTIPLE BIOPSY/POLYPECTOMY BY BIOPSY;  Surgeon: Clark Saini MD;  Location: [facility]     HC EXPLORE UNDESC TESTIS,INGUIN/SCROTAL       LAPAROSCOPIC APPENDECTOMY N/A 1/31/2018    Procedure: LAPAROSCOPIC APPENDECTOMY;  LAPAROSCOPIC APPENDECTOMY;  Surgeon: Dawn Holt MD;  [facility]: [facility]     LAPAROSCOPY DIAGNOSTIC (GENERAL) N/A 7/26/2016    Procedure: LAPAROSCOPY DIAGNOSTIC (GENERAL);  Surgeon: Susannah Arriaga MD;  [facility]     LAPAROSCOPY DIAGNOSTIC (GENERAL) N/A 4/16/2018    Procedure:  "LAPAROSCOPY DIAGNOSTIC (GENERAL);  Diagnostic laparoscopy and lysis of adhesions;  Surgeon: Prince Dowling MD;  [facility]     OPTICAL TRACKING SYSTEM CRANIOTOMY, EXCISE TUMOR, COMBINED Left 4/10/2015    Procedure: COMBINED OPTICAL TRACKING SYSTEM CRANIOTOMY, EXCISE TUMOR;  Surgeon: Mirlande Colmenares MD;  [facility]     REPAIR GAMEKEEPER'S THUMB Right 12/2/2016    Procedure: REPAIR LIGAMENT ULNAR COLLATERAL THUMB (GAMEKEEPER'S);  Surgeon: Evin Zamorano MD;  [facility]       Family History   Problem Relation Age of Onset     Diabetes Brother      Diabetes Father      Alzheimer Disease Father      Unknown/Adopted Mother      Diabetes Paternal Grandfather      Cancer No family hx of      no skin cancer     Skin Cancer No family hx of      no famiy hx of skin cancer     Glaucoma No family hx of      Macular Degeneration No family hx of        Social History   Substance Use Topics     Smoking status: Current Every Day Smoker     Packs/day: 0.50     Types: Cigarettes     Last attempt to quit: 10/28/2016     Smokeless tobacco: Former User      Comment: 4 cigarettes     Alcohol use No      Comment: Last etoh in 2007         I have reviewed the Medications, Allergies, Past Medical and Surgical History, and Social History in the Epic system.    Review of Systems   Constitutional: Negative for fever.   Respiratory: Negative for shortness of breath.    Cardiovascular: Negative for chest pain.   Gastrointestinal: Positive for abdominal pain, diarrhea, nausea and vomiting.   All other systems reviewed and are negative.      Physical Exam   BP: (!) 152/93  Pulse: 72  Temp: 98.7  F (37.1  C)  Resp: 16  Height: 162.6 cm (5' 4\")  Weight: 79.8 kg (176 lb)  SpO2: 99 %      Physical Exam   Constitutional: No distress.   HENT:   Head: Atraumatic.   Mouth/Throat: Oropharynx is clear and moist. No oropharyngeal exudate.   Eyes: Pupils are equal, round, and reactive to light. No scleral icterus. "   Cardiovascular: Normal heart sounds and intact distal pulses.    Pulmonary/Chest: Breath sounds normal. No respiratory distress.   Abdominal: Soft. There is no tenderness (mild tenderness without guarding).   Musculoskeletal: He exhibits no edema or tenderness.   Skin: Skin is warm. No rash noted. He is not diaphoretic.       ED Course     ED Course     Procedures             Critical Care time:  none             Labs Ordered and Resulted from Time of ED Arrival Up to the Time of Departure from the ED   COMPREHENSIVE METABOLIC PANEL - Abnormal; Notable for the following:        Result Value    Glucose 210 (*)     All other components within normal limits   CBC WITH PLATELETS DIFFERENTIAL   LIPASE            Assessments & Plan (with Medical Decision Making)   The patient was sound asleep when I went to see him.  He is easily arousable.  He complains of diffuse abdominal pain, though with deep palpation, while distracted, he does not seem tender, does not have guarding.  I did ask him to let us know if he had a bowel movement while here, as we would like to send sample.  Upon repeat evaluation he states he did have one viral stool since arrival, forgot to tell us about it.  Basic labs are checked and are not remarkable.  The patient is insistent that his abdominal pain is significant, thus CT was ordered, given his significant comorbidities.  He will be signed out to the oncoming provider pending CT report.  If negative, I do think he is safe for discharge home as my concern for serious cause for symptoms is low.  Certainly, given the underlying HIV disease, infections or possibility.  He does have a clinic appointment scheduled for tomorrow with his outpatient provider and is encouraged to keep this appointment.    I have reviewed the nursing notes.    I have reviewed the findings, diagnosis, plan and need for follow up with the patient.  This part of the document was transcribed by Natalie Partida Medical Scribe.    New Prescriptions    No medications on file       Final diagnoses:   Nausea and vomiting, intractability of vomiting not specified, unspecified vomiting type   Diarrhea, unspecified type   Abdominal pain, generalized   Human immunodeficiency virus (HIV) disease (H)       7/4/2018   [facility], [place], EMERGENCY DEPARTMENT     Dawn Yan MD  07/06/18 0455

## 2018-07-04 NOTE — CONSULTS
General Surgery Consult Note     Patient name: Andrew Lockwood  Date of Service: 7/4/2018  Reason for Consult: Abdominal pain   Requesting Physician: ED, Dr Lehman      Assessment/Plan:   50-year-old male with PMH HIV and diabetes presenting with acute on chronic episode of crampy abdominal pain.  Overall presentation is quite benign.  Imaging shows a single dilated loop of small bowel, however overall appearance is quite similar in in fact less severe than imaging on previous admission in April 2018, when he underwent an essentially negative diagnostic laparoscopy.  The adhesions taken down during the operation were near the appendix, but no obstruction was noted around that site, and he dilated loops did not have any clear etiology such as adhesive band or internal hernia.  Differential diagnosis for his abdominal pain, emesis, and diarrhea is broad, but overall picture is reassuring against mechanical causes of obstruction.  It is also possible that with his HIV status and a positive viral load that there may be some secondary process related to immunodeficiency status.    -No indication for acute surgical intervention  -Recommend admission to medicine for further workup.  -Serial abdominal exams overnight    Discussed with staff, Dr. Escoto.    Sohail Arredondo MD  PGY-3 General Surgery    ADDENDUM:  Upon chart check, patient discharged home by ED prior to finalization of our plan.    ------------------------------------------------------------------------  HPI:   50-year-old male with PMH of HIV with poor adherence to antiretroviral therapy and diabetes presenting with several days onset of crampy abdominal pain.  Pain mostly on left side of abdomen.  He notes several episodes of nausea and emesis yesterday, but none today.  He notes multiple loose liquid bowel movements today.  Last p.o. intake was yesterday, she has not wanting anything since because of abdominal pain. No fevers or chills. No emesis today.      Of note he has had frequent visits to the ED and previous admissions for abdominal pain and concern of bowel obstruction in the past year.  He notes intermittent episodes of severe abdominal pain over the past 2 years.  He has previously undergone laparoscopic exploration by our general surgeons Zoey Dowling and Corina (Apr 2018, July 2016) as well as lap appendectomy (Dr Holt, Jan 2018). Per chart review, these laparoscopic explorations showed portions of dilated bowel but without clear transition point, and without clear etiology as to why his small bowel is distended in portions.  Most recently in April 2018 during laparoscopic exploration some adhesiolysis was also performed in the right lower quadrant, presumably near the appendectomy site, but per operative report this is not noted to be a site of obstruction.    PMH:   Past Medical History:   Diagnosis Date     AIDS (H)      Allergic rhinitis due to other allergen     DNS     Chronic abdominal pain      CNS toxoplasmosis (H)      Diabetes type 2, controlled (H)      GERD (gastroesophageal reflux disease)      HIV (human immunodeficiency virus infection) (H)      Periungual wart      Sleep apnea     doesn't use CPAP     PSH:   Diagnostic laparoscopy with adhesiolysis, April 2018, Dr. Dowling  Laparoscopic appendectomy, January 2018, Dr. Holt  Diagnostic laparoscopy, July 2016, Dr. Arriaga    Past Surgical History:   Procedure Laterality Date     C NONSPECIFIC PROCEDURE      right forearm fracture     COLONOSCOPY Left 1/22/2016    Procedure: COMBINED COLONOSCOPY, SINGLE OR MULTIPLE BIOPSY/POLYPECTOMY BY BIOPSY;  Surgeon: Clark Saini MD;  Location: [facility]     HC EXPLORE UNDESC TESTIS,INGUIN/SCROTAL       LAPAROSCOPIC APPENDECTOMY N/A 1/31/2018    Procedure: LAPAROSCOPIC APPENDECTOMY;  LAPAROSCOPIC APPENDECTOMY;  Surgeon: Dawn Holt MD;  [facility]     LAPAROSCOPY DIAGNOSTIC (GENERAL) N/A 7/26/2016    Procedure:  "LAPAROSCOPY DIAGNOSTIC (GENERAL);  Surgeon: Susannah Arriaga MD;  [facility]     LAPAROSCOPY DIAGNOSTIC (GENERAL) N/A 4/16/2018    Procedure: LAPAROSCOPY DIAGNOSTIC (GENERAL);  Diagnostic laparoscopy and lysis of adhesions;  Surgeon: Prince Dowling MD;  [facility]     OPTICAL TRACKING SYSTEM CRANIOTOMY, EXCISE TUMOR, COMBINED Left 4/10/2015    Procedure: COMBINED OPTICAL TRACKING SYSTEM CRANIOTOMY, EXCISE TUMOR;  Surgeon: Mirlande Colmenares MD;  [facility]     REPAIR GAMEKEEPER'S THUMB Right 12/2/2016    Procedure: REPAIR LIGAMENT ULNAR COLLATERAL THUMB (GAMEKEEPER'S);  Surgeon: Evin Zamorano MD;  [facility]     Meds:   (Not in a hospital admission)     Allergies:   Allergies   Allergen Reactions     Metformin      Abdominal pain     Milk [Lac Bovis] Hives     Tylenol [Acetaminophen] Itching     Dulaglutide Rash     FamHx:   Family History   Problem Relation Age of Onset     Diabetes Brother      Diabetes Father      Alzheimer Disease Father      Unknown/Adopted Mother      Diabetes Paternal Grandfather      Cancer No family hx of      no skin cancer     Skin Cancer No family hx of      no famiy hx of skin cancer     Glaucoma No family hx of      Macular Degeneration No family hx of      SocHx:   Social History     Social History     Marital status: Single     Spouse name: N/A     Number of children: N/A     Years of education: N/A     Occupational History           5 years; temp agency     Social History Main Topics     Smoking status: Current Every Day Smoker     Packs/day: 0.50     Types: Cigarettes     Last attempt to quit: 10/28/2016     Smokeless tobacco: Former User      Comment: 4 cigarettes     Alcohol use No      Comment: Last etoh in 2007     Drug use: No      Comment: \"Not very often\"     Sexual activity: Not Currently     Partners: Female, Male      Comment: Last sexual activity 2008     Other Topics Concern     Not on file     Social History " "Narrative    Born in [facility].  [place]ame to the [place] in [place].  Last traveled to visit family in 2008.             ROS: 10pt ROS negative except as noted above.      Objective:   /85  Pulse 72  Temp 98.7  F (37.1  C) (Oral)  Resp 16  Ht 1.626 m (5' 4\")  Wt 79.8 kg (176 lb)  SpO2 94%  BMI 30.21 kg/m2  General - no acute distress, comfortable. Sleeping but arousable.  HEENT - normocephalic, atraumatic  Cardio - regular rate, regular rhythm  Pulm - non labored respirations on room air  Abdomen - soft, nondistended. Minimal tenderness to left abdomen. No rebound or guarding. Well-healed surgical scars.  Neuro - moves all extremities without apparent deficit, non-focal  Extremities - no lower extremity edema, warm, well-perfused  Skin - no rash or bruising  Psych - age appropriate mental status / engagement     Labs:  WBC 9.5  Hgb 14.7  BMP WNL  Hepatic panel WNL    Imaging:  CT abd pelvis reviewed. A segment of dilated small bowel seen, though no swirl sign or signs of intestinal compromise are noted. Otherwise unremarkable.      "

## 2018-07-04 NOTE — ED AVS SNAPSHOT
[facility], Emergency Department    [address] [address]    Phone:  [phone]                                       Andrew Lockwood   MRN: [id number]    Department:  [facility], [place], Emergency Department   Date of Visit:  7/4/2018           Patient Information     Date Of Birth          11/27/1967        Your diagnoses for this visit were:     Nausea and vomiting, intractability of vomiting not specified, unspecified vomiting type     Diarrhea, unspecified type     Abdominal pain, generalized     Human immunodeficiency virus (HIV) disease (H)        You were seen by Dawn Yan MD.        Discharge Instructions       Follow up with your doctor tomorrow, as planned. Return with concerns.  The surgeons are not concerned about your CT scan    Discharge References/Attachments     VOMITING OR DIARRHEA (ADULT), DIET FOR (ENGLISH)      Your next 10 appointments already scheduled     Jul 06, 2018 10:15 AM CDT   (Arrive by 10:00 AM)   Return Visit with Rekha Membreno MD   [facility] ([facility])    [address] [place] [address]  4th Floor  [facility] [id number] [phone]            Jul 19, 2018  9:30 AM CDT   (Arrive by 9:15 AM)   Return Visit with Sharon Rosado MD   [facility] ([facility])    [address] [place] [address]  [address]  [facility] [id number] [phone]            Aug 07, 2018 11:00 AM CDT   (Arrive by 10:45 AM)   RETURN DIABETES with Lenora Haley PA-C   [facility] ([facility])    [address] [place] [address]  3rd Floor  [facility] [id number] [phone]              24 Hour Appointment Hotline       To make an appointment at any [facility], call [phone] ([phone]). If you don't have a family doctor or clinic, we will help you find one. [facility] are conveniently located to serve the needs of  you and your family.             Review of your medicines      Our records show that you are taking the medicines listed below. If these are incorrect, please call your family doctor or clinic.        Dose / Directions Last dose taken    * BASAGLAR 100 UNIT/ML injection   Quantity:  15 mL        Inject 7 units once daily subcutaneously   Refills:  3        * LANTUS SOLOSTAR 100 UNIT/ML injection   Quantity:  15 mL   Generic drug:  insulin glargine        Take 14 units each morning   Refills:  3        blood glucose lancets standard   Commonly known as:  no brand specified   Quantity:  100 Box        Use to test blood sugar four times daily or as directed.   Refills:  5        blood glucose monitoring lancets   Quantity:  100 each        Use to test blood sugar 2 times daily or as directed.  Ok to substitute alternative if insurance prefers.   Refills:  11        * blood glucose monitoring meter device kit   Commonly known as:  no brand specified   Quantity:  1 kit        Use to test blood sugar four times daily or as directed.   Refills:  0        * blood glucose monitoring meter device kit   Commonly known as:  no brand specified   Quantity:  1 kit        Use to test blood sugar 4 times daily or as directed.   Refills:  0        * blood glucose monitoring test strip   Commonly known as:  no brand specified   Quantity:  100 each        Use to test blood sugars four times daily or as directed   Refills:  5        * blood glucose monitoring test strip   Commonly known as:  no brand specified   Dose:  1 strip   Quantity:  100 strip        1 strip by In Vitro route 4 times daily (before meals and nightly) Use to test blood sugars 4 times daily or as directed   Refills:  11        * blood glucose monitoring test strip   Commonly known as:  GELACIO CONTOUR NEXT   Quantity:  100 strip        Use to test blood sugar 3 times daily or as directed.  Ok to substitute alternative if insurance prefers.   Refills:  11         Lmtobnb-Omlfo-Kocovpem-TenofAF [id number] MG Tabs per tablet   Commonly known as:  GENVOYA   Dose:  1 tablet   Indication:  Positive HIV Test   Quantity:  30 tablet        Take 1 tablet by mouth daily (with breakfast)   Refills:  0        empagliflozin 10 MG Tabs tablet   Commonly known as:  JARDIANCE   Dose:  10 mg   Quantity:  30 tablet        Take 1 tablet (10 mg) by mouth daily   Refills:  1        * insulin pen needle 31G X 8 MM   Commonly known as:  B-D U/F   Quantity:  100 each        Use 1 pen needles daily or as directed.   Refills:  0        * insulin pen needle 32G X 4 MM   Commonly known as:  BD SCOTT U/F   Quantity:  100 each        Use 1 daily as directed.   Refills:  11        ondansetron 4 MG ODT tab   Commonly known as:  ZOFRAN ODT   Dose:  4 mg   Quantity:  10 tablet        Take 1 tablet (4 mg) by mouth every 8 hours as needed   Refills:  0        polyethylene glycol powder   Commonly known as:  MIRALAX   Dose:  1 capful   Quantity:  225 g        Take 17 g (1 capful) by mouth daily   Refills:  0        povidone-iodine 7.5 % Soln topical solution   Commonly known as:  APLICARE POVIDONE-IODINE SCRUB   Quantity:  473 mL        Wash extremities 2-3 times per week and let 5min on skin before rinsing  Put on legs and arms 1-2 times per day Cetaphil Lotio   Refills:  3        PRILOSEC PO   Dose:  10 mg        Take 10 mg by mouth daily   Refills:  0        triamcinolone 0.1 % cream   Commonly known as:  KENALOG   Quantity:  30 g        Apply topically every evening Put on prurigo lesions for 5 days maximum   Refills:  3        * Notice:  This list has 9 medication(s) that are the same as other medications prescribed for you. Read the directions carefully, and ask your doctor or other care provider to review them with you.            Procedures and tests performed during your visit     CBC with platelets differential    CT Abdomen Pelvis w Contrast    Comprehensive metabolic panel    Lipase      Orders  Needing Specimen Collection     None      Pending Results     Date and Time Order Name Status Description    7/4/2018 0609 CT Abdomen Pelvis w Contrast Preliminary             Pending Culture Results     No orders found from 7/2/2018 to 7/5/2018.            Pending Results Instructions     If you had any lab results that were not finalized at the time of your Discharge, you can call the ED Lab Result RN at [phone]. You will be contacted by this team for any positive Lab results or changes in treatment. The nurses are available 7 days a week from 10A to 6:30P.  You can leave a message 24 hours per day and they will return your call.        Thank you for choosing [facility]       Thank you for choosing [facility] for your care. Our goal is always to provide you with excellent care. Hearing back from our patients is one way we can continue to improve our services. Please take a few minutes to complete the written survey that you may receive in the mail after you visit with us. Thank you!        [facility] Information     [facility] gives you secure access to your electronic health record. If you see a primary care provider, you can also send messages to your care team and make appointments. If you have questions, please call your primary care clinic.  If you do not have a primary care provider, please call [phone] and they will assist you.        Care EveryWhere ID     This is your Care EveryWhere ID. This could be used by other organizations to access your [facility] medical records  [id number]        Equal Access to Services     RA JORDAN : Apollo Cody, waaxda luvirginiaadaha, qaybta kaalmadez najera. So [facility] [phone].    ATENCIÓN: Si habla español, tiene a kohli disposición servicios gratuitos de asistencia lingüística. Llame al [phone].    We comply with applicable federal civil rights laws and [place] laws. We do not discriminate on the basis of  race, color, national origin, age, disability, sex, sexual orientation, or gender identity.            After Visit Summary       This is your record. Keep this with you and show to your community pharmacist(s) and doctor(s) at your next visit.

## 2018-07-06 ENCOUNTER — OFFICE VISIT (OUTPATIENT)
Dept: INTERNAL MEDICINE | Facility: CLINIC | Age: 53
End: 2018-07-06
Payer: COMMERCIAL

## 2018-07-06 VITALS
HEART RATE: 92 BPM | BODY MASS INDEX: 29.88 KG/M2 | DIASTOLIC BLOOD PRESSURE: 73 MMHG | SYSTOLIC BLOOD PRESSURE: 116 MMHG | WEIGHT: 174.1 LBS

## 2018-07-06 DIAGNOSIS — Z79.4 TYPE 2 DIABETES MELLITUS WITHOUT COMPLICATION, WITH LONG-TERM CURRENT USE OF INSULIN (H): ICD-10-CM

## 2018-07-06 DIAGNOSIS — R21 RASH: ICD-10-CM

## 2018-07-06 DIAGNOSIS — K90.49 FOOD INTOLERANCE: ICD-10-CM

## 2018-07-06 DIAGNOSIS — E11.9 TYPE 2 DIABETES MELLITUS WITHOUT COMPLICATION, WITH LONG-TERM CURRENT USE OF INSULIN (H): ICD-10-CM

## 2018-07-06 DIAGNOSIS — R10.9 RECURRENT ABDOMINAL PAIN: Primary | ICD-10-CM

## 2018-07-06 RX ORDER — DIPHENHYDRAMINE HCL 25 MG
25 CAPSULE ORAL EVERY 6 HOURS PRN
Qty: 30 CAPSULE | Refills: 3 | Status: SHIPPED | OUTPATIENT
Start: 2018-07-06 | End: 2018-07-19

## 2018-07-06 ASSESSMENT — PAIN SCALES - GENERAL: PAINLEVEL: EXTREME PAIN (8)

## 2018-07-06 NOTE — PATIENT INSTRUCTIONS
"[facility]: [phone]     [facility] Medication Refill Request Information:  * Please contact your pharmacy regarding ANY request for medication refills.  ** [facility] Prescription Fax = [phone]  * Please allow 3 business days for routine medication refills.  * Please allow 5 business days for controlled substance medication refills.     [facility] Test Result notification information:  *You will be notified with in 7-10 days of your appointment day regarding the results of your test.  If you are on MyChart you will be notified as soon as the provider has reviewed the results and signed off on them.    Gluten-Free Diet for Celiac Disease     Reading food labels will help you stay away from foods with gluten.     Celiac disease means that you are sensitive to a protein called gluten. Gluten is found in certain grains. When you eat gluten, your immune system causes harm to your small intestines. The treatment for celiac disease is to stay away from foods and products that contain gluten. You will need to do this for the rest of your life. Resist the temptation to  cheat,  because even a small amount of gluten can cause symptoms to return. And it can harm your body. This sheet gives you the basics about a gluten-free diet. If you need help, a registered dietitian can teach you what foods and other products have gluten and how to keep away from them.  Always read labels!  Many foods may contain gluten, even if you think they don't. Get into the habit of reading ingredient labels before you eat.   Choosing foods  The most common source of gluten is wheat flour (this includes \"white\" flour). Wheat flour is used to make many baked goods, including breads, pastas, cereals, pastries, and pizza dough. Gluten is also found in many foods that you might not think would have it. You will need to read food labels to look for gluten in everything you eat. But your diet does not need to be boring. Many " "foods are naturally gluten-free. And many foods commonly made with wheat flour now come in gluten-free forms.   Foods to stay away from Foods you can eat   Bread, cereals, pasta, pastries, couscous, or pizza dough made with wheat flour (including white flour, farina, farro, emmer, durum, esau, and semolina) Bread, cereals, pasta, pastries, or pizza dough made with rice flour, almond flour, beans, potatoes, and other gluten-free substitutes   Foods containing rye, barley (including malt), spelt, kamut, triticale, ywatt's yeast, and bulgur Foods containing corn, cassava, rice, amaranth, buckwheat, millet, quinoa, arrowroot, teff, soy, and tapioca   Processed meats Fresh meats and seafood (beef, chicken, turkey, lamb, pork, fish, shellfish), beans, and tofu   Some dairy products with additives Many plain dairy products   Many sauces, gravies, dressings, and condiments, including traditional soy sauce Vinegar, oils, and gluten-free substitutes   Some granola bars and energy bars Granola bars and energy bars labeled \"gluten-free\"   Some beers and spirits Wine, and gluten-free beers and distilled spirits   Some soups Gluten-free soups   Fruits and vegetables that are fried or breaded Fresh fruits and vegetables   Many packaged foods Packaged foods labeled \"gluten-free\"   Oats (check with your healthcare provider) Gluten-free oats   Communion wafers Gluten-free communion wafers   If you are exposed to gluten by accident  Staying gluten-free means always being aware. Even if you are very careful, mistakes can happen. The food you eat can't come into contact with gluten. Your meals must be made with utensils that have not touched foods that contain gluten. Shared knives, cutting boards, toasters, and storage containers are risks for gluten exposure. Shared condiments may have crumbs that contain gluten. At restaurants, parties, and other places where you eat food prepared by others, ask how the food was made. Gluten can " also be found in some non-food items. Some medicines contain gluten. So do some vitamin supplements. Ask your pharmacist before taking a medicine or supplement. Also, some shampoos, lotions, toothpastes, makeup, lipsticks as well as lip gloss and lip balm, glues, soaps, and other products contain gluten. It can be possible to ingest some gluten when using these projects. This is called cross-contamination. For example, this can happen if you use a lotion that has gluten and then touch food you eat. Children's ronda and similar products have gluten. Any adult or child with celiac disease should wash their hands after handling these.  Coping with gluten-free living  Living gluten-free can be hard. But it can be done. While there are many gluten-free foods now that you can buy, it is still a big change for many people. You may be upset that you can't eat your favorite foods, eat freely at restaurants, parties, or over the holidays. Household members may also be upset by the strict controls over food. If you face problems like these, think about joining a celiac disease support group. Support groups offer tips on how to make a gluten-free lifestyle easier on you and the people you live with. You can find ways to involve the people in your household. There are many ways to make gluten-free group meals. See  More Resources  below for help in finding a group.  Bring safe foods that you enjoy to parties and school or work events. This can help you avoid the urge to grab something you shouldn t eat.   Following up with your healthcare provider  You should see your healthcare provider at least once a year for a checkup. A simple blood test can show if your celiac disease is under control. If you are having symptoms, your healthcare provider can help you find sources of gluten you may have missed.  More resources  To learn more about managing celiac disease, go to:    Celiac Disease Foundation: www.celiac.org    Academy of  Nutrition and Dietetics: www.eatright.org    National Digestive Diseases Information Clearinghouse: www.digestive.niddk.nih.gov  Date Last Reviewed: 6/1/2017 2000-2017 [employer]. [address] [place] [address], [place] [address]. All rights reserved. This information is not intended as a substitute for professional medical care. Always follow your healthcare professional's instructions.        Tips for Lactose Intolerance    If you are lactose intolerant, you have trouble digesting lactose. Lactose is a sugar found in milk and other dairy products. Many people are lactose intolerant. Undigested lactose won t hurt you, but it can cause unpleasant symptoms. The good news is that you can get relief. To help reduce symptoms, look for ways to limit the amount of lactose you eat. You can also take a lactase supplement before you eat dairy products.  Finding your limit  People with lactose intolerance may think they can t eat or drink any dairy products. This is often not true. Many people with lactose intolerance can eat or drink small amounts of dairy products without symptoms. To find your own limit, keep track of what you eat and drink. Write down when you have symptoms. Learn how much and what kinds of dairy products you can handle.  Tips to reduce symptoms    Choose low-lactose or lactose-free dairy products. These are widely available. They include products like milk, yogurt, cheese, and ice cream, among others.    Eat foods with active cultures, such as yogurt. Active cultures make lactose easier to digest.    Eat or drink dairy products with other foods to lessen symptoms.    Use fruit juice to replace some or all of the milk in recipes.    Take lactase enzyme tablets with dairy products to help prevent symptoms.    Avoid eating many high-lactose foods (such as milk, butter, and ice cream) at one time.  Eat other calcium-rich foods  If you eat less dairy, you may be getting less calcium. Ask your doctor  about calcium supplements. Also, eat more dairy-free, calcium-rich foods, such as:    Broccoli, lettuce greens, kale, bok dorene (Chinese cabbage), turnip greens    Fish with edible bones (canned salmon or sardines)    Alfalfa sprouts, soy sprouts    Tofu, soybeans, odell beans, navy beans    Almonds, sesame seeds    Calcium-fortified orange juice, soy drink, rice drink    Oranges  Be aware that the calcium from these foods varies. It may not be as well absorbed by the body as calcium from dairy products.   Try nondairy substitutes  Dairy Substitute   Milk, cream Soy drink, rice drink, nondairy creamer   Cheese Tofu (soy) cheese, some aged cheeses   Butter, margarine Milk-free margarine, vegetable oil   Ice cream Fruit sorbet, juice bars      Your body needs vitamin D to use calcium. You can get vitamin D by eating foods that have vitamin D. These include salmon, tuna, and eggs. Also, talk with your provider about taking a vitamin D supplement. Your vitamin D levels can be checked and followed by a blood test to be sure you are not lacking this nutrient.  Removing all dairy items from your diet is not often needed. And removing dairy also means taking out other healthy foods from your diet. This is why lactose-free dairy products are often a good choice. Your provider can also talk with you about taking calcium and vitamin D supplements.  Date Last Reviewed: 7/1/2016 2000-2017 [employer]. [address] [place] [address], [place] [address]. All rights reserved. This information is not intended as a substitute for professional medical care. Always follow your healthcare professional's instructions.

## 2018-07-06 NOTE — PROGRESS NOTES
"[facility]   Rekha Membreno MD  07/06/2018      Chief Complaint:   Hospital F/U       History of Present Illness:   Andrew Lockwood is a 50 year old male with a history of AIDS, HIV, and type 2 diabetes managed with insulin, and abdominal pain who presents for a hospital follow up.    The patient was evaluated in the ED twice in the past weeks for abdominal problems, nausea, and vomiting. All pertinent notes and test results were reviewed with the patient. The patient has been dealing with twisting abdominal pain, bloating, cramps, and heart burn for the past two years. No fevers chills. The patient reports that he is lactose intolerant and may have a gluten intolerance as well, however because he is homeless he is only able to eat what the shelters provides. This fact makes it difficult for the patient to rule of the etiology of his abdominal symptoms. He reports that his weight has been stable over the past two years. No other \"red flag\" symptoms.    The patient also reports having red welts along his legs, arms, stomach, and back. They are itchy.  Lives in his car. Does his best to wash his clothing and blankets.  Also lives in shelters.    Health Care Maintenance/Other:  1. Infectious disease appointment 7/19/2018  2. Endocrinology appointment 8/  3. HIV managed by Dr. Rosado.     Review of Systems:   Pertinent items are noted in HPI, remainder of complete ROS is negative.      Active Medications:      BASAGLAR 100 UNIT/ML injection, Inject 7 units once daily subcutaneously, Disp: 15 mL, Rfl: 3     empagliflozin (JARDIANCE) 10 MG TABS tablet, Take 1 tablet (10 mg) by mouth daily, Disp: 30 tablet, Rfl: 1     GENVOYA [phone] mg tablet, Take 1 tablet by mouth daily (with breakfast), Disp: 30 tablet, Rfl: 0     insulin glargine (LANTUS SOLOSTAR) 100 UNIT/ML pen, Take 14 units each morning, Disp: 15 mL, Rfl: 3     insulin pen needle (B-D U/F) 31G X 8 MM, Use 1 pen needles daily or as " directed., Disp: 100 each, Rfl: 0     insulin pen needle (BD SCOTT U/F) 32G X 4 MM, Use 1 daily as directed., Disp: 100 each, Rfl: 11     Omeprazole (PRILOSEC PO), Take 10 mg by mouth daily, Disp: , Rfl:      ondansetron (ZOFRAN ODT) 4 MG ODT tab, Take 1 tablet (4 mg) by mouth every 8 hours as needed, Disp: 10 tablet, Rfl: 0     polyethylene glycol (MIRALAX) powder, Take 17 g (1 capful) by mouth daily (Patient not taking: Reported on 6/22/2018), Disp: 225 g, Rfl: 0     povidone-iodine (APLICARE POVIDONE-IODINE SCRUB) 7.5 % SOLN topical solution, Wash extremities 2-3 times per week and let 5min on skin before rinsing  Put on legs and arms 1-2 times per day Cetaphil Lotio, Disp: 473 mL, Rfl: 3     triamcinolone (KENALOG) 0.1 % cream, Apply topically every evening Put on prurigo lesions for 5 days maximum, Disp: 30 g, Rfl: 3      Allergies:   Metformin  Milk [lac bovis]  Tylenol [acetaminophen]  Dulaglutide      Past Medical History:  AIDS (H)  Allergic rhinitis due to other allergen  Chronic abdominal pain  CNS toxoplasmosis (H)  Diabetes type 2, controlled (H)  Gastroesophageal reflux disease  HIV (human immunodeficiency virus infection) (H)  Periungual wart  Sleep apnea  Brain lesion  Toxoplasma encephalitis (H)  Pulmonary nodules  Folliculitis  Prurigo nodularis  Epidermal cyst  Shoulder joint pain, unspecified laterality  Slow transit constipation  Non-intractable vomiting with nausea, vomiting of unspecified type  Thrush  Insomnia, unspecified insomnia  Small bowel obstruction  Headache  Aphthous ulcer  Type 2 diabetes mellitus (H)  Herpes zoster  Erectile dysfunction, unspecified erectile dysfunction type  Insomnia, unspecified type  Pain of right thumb  Rupture of ulnar collateral ligament of right thumb  Panic disorder  Generalized anxiety disorder  Major depressive disorder, single episode, unspecified  Abdominal pain  Appendicitis  Horseshoe tear of retina of left eye without detachment     Past Surgical  History:  Right forearm fracture  Colonoscopy, left  Explore undesc testis, inguin/scrotal  Laparoscopy diagnostic (General) x 2  Optical tracking system craniotomy, excise tumor, combined, left  Repair gamekeeper's thumb, right    Family History:   Brother- Diabetes  Father- Diabetes, Alzheimer's disease      Social History:   The patient is not , a smoker (0.5 packs a day), and does not consume alcohol.      Physical Exam:   /73  Pulse 92  Wt 79 kg (174 lb 1.6 oz)  BMI 29.88 kg/m2     General:  Pleasant, alert, no acute distress  Abdomen:  Not distended.  Bowel sounds active.  No hepatosplenomegaly or masses.  No CVA tenderness or masses. Diffuse palpation tenderness but no rebound, tympanic to percussion  Skin:   No jaundice.  Normal moisture, good skin turgor. Diffuse chigger bites on his legs, arms, and trunk, 0.5 cm dry papules with excoriations, in a linear pattern left knee and upper left arm.   Psych:  Broad range affect.  Not psychomotor slowed.  No signs of anxiety or agitation.    Assessment and Plan:  Recurrent abdominal pain and Food intolerance  Patient has been dealing with abdominal bloating and cramping for the past 2 years. Symptoms are believed to be associated with dietary factors. The patient is homeless and so it is very difficult to avoid dairy and gluten, however he will do his best to eliminate these foods. He was prescribed Lactase to take with food when needed (1-6 tablets). I instructed him on an elimination diet which he will try to the best of his ability.  - GASTROENTEROLOGY ADULT REF CONSULT ONLY  - Lactase 9000 units CHEW  Dispense: 60 tablet; Refill: 3    Rash  Patient has diffuse papules, possibly insects e.g. Bedbugs, chiggars. He was advised to vacuum his living space/car thoroughly and frequent, and wash his clothing/bedding to rid them and prevent contact with them. Unlikely more systemic reaction such as to medications.  - diphenhydrAMINE (BENADRYL) 2 % cream   Dispense: 30 g; Refill: 3  - diphenhydrAMINE (BENADRYL) 25 MG capsule  Dispense: 30 capsule; Refill: 3    Type 2 diabetes mellitus without complication, with long-term current use of insulin (H)  Check at this time.  - Albumin Random Urine Quantitative with Creat Ratio    Follow-up: Return in about 3 months (around 10/6/2018) for f/u abd pain.      Total time spent 25 minutes.  More than 50% of the time spent with Mr. Lockwood on counseling / coordinating his care           Scribe Disclosure:   I, Zahida Wilson, am serving as a scribe to document services personally performed by Rekha Membreno MD at this visit, based upon the provider's statements to me. All documentation has been reviewed by the aforementioned provider prior to being entered into the official medical record.     Portions of this medical record were completed by a scribe. UPON MY REVIEW AND AUTHENTICATION BY ELECTRONIC SIGNATURE, this confirms (a) I performed the applicable clinical services, and (b) the record is accurate. \  Rekha Membreno M.D.  Internal Medicine  [facility]   pager [phone]

## 2018-07-06 NOTE — MR AVS SNAPSHOT
After Visit Summary   7/6/2018    Andrew Lockwood    MRN: [id number]           Patient Information     Date Of Birth          11/27/1967        Visit Information        Provider Department      7/6/2018 10:15 AM Rekha Membreno MD [facility]        Today's Diagnoses     Recurrent abdominal pain    -  1    Rash        Type 2 diabetes mellitus without complication, with long-term current use of insulin (H)        Food intolerance          Care Instructions    Primary Care Center: [phone]     Primary Care Center Medication Refill Request Information:  * Please contact your pharmacy regarding ANY request for medication refills.  ** [facility] Prescription Fax = [phone]  * Please allow 3 business days for routine medication refills.  * Please allow 5 business days for controlled substance medication refills.     Primary Care Center Test Result notification information:  *You will be notified with in 7-10 days of your appointment day regarding the results of your test.  If you are on MyChart you will be notified as soon as the provider has reviewed the results and signed off on them.    Gluten-Free Diet for Celiac Disease     Reading food labels will help you stay away from foods with gluten.     Celiac disease means that you are sensitive to a protein called gluten. Gluten is found in certain grains. When you eat gluten, your immune system causes harm to your small intestines. The treatment for celiac disease is to stay away from foods and products that contain gluten. You will need to do this for the rest of your life. Resist the temptation to  cheat,  because even a small amount of gluten can cause symptoms to return. And it can harm your body. This sheet gives you the basics about a gluten-free diet. If you need help, a registered dietitian can teach you what foods and other products have gluten and how to keep away from them.  Always read labels!  Many foods may contain gluten,  "even if you think they don't. Get into the habit of reading ingredient labels before you eat.   Choosing foods  The most common source of gluten is wheat flour (this includes \"white\" flour). Wheat flour is used to make many baked goods, including breads, pastas, cereals, pastries, and pizza dough. Gluten is also found in many foods that you might not think would have it. You will need to read food labels to look for gluten in everything you eat. But your diet does not need to be boring. Many foods are naturally gluten-free. And many foods commonly made with wheat flour now come in gluten-free forms.   Foods to stay away from Foods you can eat   Bread, cereals, pasta, pastries, couscous, or pizza dough made with wheat flour (including white flour, farina, farro, emmer, durum, esau, and semolina) Bread, cereals, pasta, pastries, or pizza dough made with rice flour, almond flour, beans, potatoes, and other gluten-free substitutes   Foods containing rye, barley (including malt), spelt, kamut, triticale, wyatt's yeast, and bulgur Foods containing corn, cassava, rice, amaranth, buckwheat, millet, quinoa, arrowroot, teff, soy, and tapioca   Processed meats Fresh meats and seafood (beef, chicken, turkey, lamb, pork, fish, shellfish), beans, and tofu   Some dairy products with additives Many plain dairy products   Many sauces, gravies, dressings, and condiments, including traditional soy sauce Vinegar, oils, and gluten-free substitutes   Some granola bars and energy bars Granola bars and energy bars labeled \"gluten-free\"   Some beers and spirits Wine, and gluten-free beers and distilled spirits   Some soups Gluten-free soups   Fruits and vegetables that are fried or breaded Fresh fruits and vegetables   Many packaged foods Packaged foods labeled \"gluten-free\"   Oats (check with your healthcare provider) Gluten-free oats   Communion wafers Gluten-free communion wafers   If you are exposed to gluten by accident  Staying " gluten-free means always being aware. Even if you are very careful, mistakes can happen. The food you eat can't come into contact with gluten. Your meals must be made with utensils that have not touched foods that contain gluten. Shared knives, cutting boards, toasters, and storage containers are risks for gluten exposure. Shared condiments may have crumbs that contain gluten. At restaurants, parties, and other places where you eat food prepared by others, ask how the food was made. Gluten can also be found in some non-food items. Some medicines contain gluten. So do some vitamin supplements. Ask your pharmacist before taking a medicine or supplement. Also, some shampoos, lotions, toothpastes, makeup, lipsticks as well as lip gloss and lip balm, glues, soaps, and other products contain gluten. It can be possible to ingest some gluten when using these projects. This is called cross-contamination. For example, this can happen if you use a lotion that has gluten and then touch food you eat. Children's ronda and similar products have gluten. Any adult or child with celiac disease should wash their hands after handling these.  Coping with gluten-free living  Living gluten-free can be hard. But it can be done. While there are many gluten-free foods now that you can buy, it is still a big change for many people. You may be upset that you can't eat your favorite foods, eat freely at restaurants, parties, or over the holidays. Household members may also be upset by the strict controls over food. If you face problems like these, think about joining a celiac disease support group. Support groups offer tips on how to make a gluten-free lifestyle easier on you and the people you live with. You can find ways to involve the people in your household. There are many ways to make gluten-free group meals. See  More Resources  below for help in finding a group.  Bring safe foods that you enjoy to parties and school or work events. This  can help you avoid the urge to grab something you shouldn t eat.   Following up with your healthcare provider  You should see your healthcare provider at least once a year for a checkup. A simple blood test can show if your celiac disease is under control. If you are having symptoms, your healthcare provider can help you find sources of gluten you may have missed.  More resources  To learn more about managing celiac disease, go to:    Celiac Disease Foundation: www.celiac.org    Academy of Nutrition and Dietetics: www.eatright.org    National Digestive Diseases Information Clearinghouse: www.digestive.niddk.nih.gov  Date Last Reviewed: 6/1/2017 2000-2017 [employer]. [address] [place] [address], [place] [address]. All rights reserved. This information is not intended as a substitute for professional medical care. Always follow your healthcare professional's instructions.        Tips for Lactose Intolerance    If you are lactose intolerant, you have trouble digesting lactose. Lactose is a sugar found in milk and other dairy products. Many people are lactose intolerant. Undigested lactose won t hurt you, but it can cause unpleasant symptoms. The good news is that you can get relief. To help reduce symptoms, look for ways to limit the amount of lactose you eat. You can also take a lactase supplement before you eat dairy products.  Finding your limit  People with lactose intolerance may think they can t eat or drink any dairy products. This is often not true. Many people with lactose intolerance can eat or drink small amounts of dairy products without symptoms. To find your own limit, keep track of what you eat and drink. Write down when you have symptoms. Learn how much and what kinds of dairy products you can handle.  Tips to reduce symptoms    Choose low-lactose or lactose-free dairy products. These are widely available. They include products like milk, yogurt, cheese, and ice cream, among others.    Eat  foods with active cultures, such as yogurt. Active cultures make lactose easier to digest.    Eat or drink dairy products with other foods to lessen symptoms.    Use fruit juice to replace some or all of the milk in recipes.    Take lactase enzyme tablets with dairy products to help prevent symptoms.    Avoid eating many high-lactose foods (such as milk, butter, and ice cream) at one time.  Eat other calcium-rich foods  If you eat less dairy, you may be getting less calcium. Ask your doctor about calcium supplements. Also, eat more dairy-free, calcium-rich foods, such as:    Broccoli, lettuce greens, kale, bok dorene (Chinese cabbage), turnip greens    Fish with edible bones (canned salmon or sardines)    Alfalfa sprouts, soy sprouts    Tofu, soybeans, odell beans, navy beans    Almonds, sesame seeds    Calcium-fortified orange juice, soy drink, rice drink    Oranges  Be aware that the calcium from these foods varies. It may not be as well absorbed by the body as calcium from dairy products.   Try nondairy substitutes  Dairy Substitute   Milk, cream Soy drink, rice drink, nondairy creamer   Cheese Tofu (soy) cheese, some aged cheeses   Butter, margarine Milk-free margarine, vegetable oil   Ice cream Fruit sorbet, juice bars      Your body needs vitamin D to use calcium. You can get vitamin D by eating foods that have vitamin D. These include salmon, tuna, and eggs. Also, talk with your provider about taking a vitamin D supplement. Your vitamin D levels can be checked and followed by a blood test to be sure you are not lacking this nutrient.  Removing all dairy items from your diet is not often needed. And removing dairy also means taking out other healthy foods from your diet. This is why lactose-free dairy products are often a good choice. Your provider can also talk with you about taking calcium and vitamin D supplements.  Date Last Reviewed: 7/1/2016 2000-2017 [employer]. [address] [facility],  [place] [address]. All rights reserved. This information is not intended as a substitute for professional medical care. Always follow your healthcare professional's instructions.                Follow-ups after your visit        Additional Services     GASTROENTEROLOGY ADULT REF CONSULT ONLY       Preferred Location: consult, adult GI      Please be aware that coverage of these services is subject to the terms and limitations of your health insurance plan.  Call member services at your health plan with any benefit or coverage questions.  Any procedures must be performed at a [employer] facility OR coordinated by your clinic's referral office.    Please bring the following with you to your appointment:    (1) Any X-Rays, CTs or MRIs which have been performed.  Contact the facility where they were done to arrange for  prior to your scheduled appointment.    (2) List of current medications   (3) This referral request   (4) Any documents/labs given to you for this referral                  Follow-up notes from your care team     Return in about 3 months (around 10/6/2018) for f/u abd pain.      Your next 10 appointments already scheduled     Jul 19, 2018  9:30 AM CDT   (Arrive by 9:15 AM)   Return Visit with Sharon Rosado MD   [facility] ([facility])    [address]  [address]  [facility] [address] [phone]            Jul 20, 2018 11:20 AM CDT   (Arrive by 11:05 AM)   New Patient Visit with Audrey Simmons MD   [facility] ([facility])    [address]  [address]  [facility] [address]   [phone]            Aug 07, 2018 11:00 AM CDT   (Arrive by 10:45 AM)   RETURN DIABETES with Lenora Haley PA-C   [facility] ([facility])    [address]  [address]  [facility] [address] [phone]             Oct 08, 2018  1:15 PM CDT   (Arrive by 1:00 PM)   Return Visit with Rekha Membreno MD   [facility] ([facility])    [address] [address] [phone]              Future tests that were ordered for you today     Open Future Orders        Priority Expected Expires Ordered    Albumin Random Urine Quantitative with Creat Ratio Routine 7/6/2018 7/6/2019 7/6/2018            Who to contact     Please call your clinic at [phone] to:    Ask questions about your health    Make or cancel appointments    Discuss your medicines    Learn about your test results    Speak to your doctor            Additional Information About Your Visit        [employer] Information     [employer] gives you secure access to your electronic health record. If you see a primary care provider, you can also send messages to your care team and make appointments. If you have questions, please call your primary care clinic.  If you do not have a primary care provider, please call [phone] and they will assist you.      [employer] is an electronic gateway that provides easy, online access to your medical records. With [employer], you can request a clinic appointment, read your test results, renew a prescription or communicate with your care team.     To access your existing account, please contact your [facility] or call [phone] for assistance.        Care EveryWhere ID     This is your Care EveryWhere ID. This could be used by other organizations to access your [place] medical records  [id number]        Your Vitals Were     Pulse BMI (Body Mass Index)                92 29.88 kg/m2           Blood Pressure from Last 3 Encounters:   07/06/18 116/73   07/04/18 103/75   07/03/18 (!) 131/92    Weight from Last 3 Encounters:   07/06/18 79 kg (174 lb 1.6 oz)   07/04/18 79.8 kg (176 lb)   07/03/18 79.8 kg (176 lb)              We Performed the  Following     GASTROENTEROLOGY ADULT REF CONSULT ONLY          Today's Medication Changes          These changes are accurate as of 7/6/18 11:20 AM.  If you have any questions, ask your nurse or doctor.               Start taking these medicines.        Dose/Directions    diphenhydrAMINE 2 % cream   Commonly known as:  BENADRYL   Used for:  Rash   Started by:  Rekah Membreno MD        Apply topically 3 times daily as needed for itching   Quantity:  30 g   Refills:  3       diphenhydrAMINE 25 MG capsule   Commonly known as:  BENADRYL   Used for:  Rash   Started by:  Rekha Membreno MD        Dose:  25 mg   Take 1 capsule (25 mg) by mouth every 6 hours as needed for itching or allergies   Quantity:  30 capsule   Refills:  3       Lactase 9000 units Chew   Used for:  Food intolerance   Started by:  Rekha Membreno MD        1-6 tablets by mouth prior to eating/drinking milk product   Quantity:  60 tablet   Refills:  3            Where to get your medicines      These medications were sent to [address]  [address] [place] [address] [address] [id number]    Hours:  TRANSPLANT PHONE NUMBER [phone] Phone:  [phone]     diphenhydrAMINE 2 % cream    diphenhydrAMINE 25 MG capsule    Lactase 9000 units Chew                Primary Care Provider Office Phone # Fax #    Rubio Cronin [phone]       [facility] [address]  [facility] [address]        Equal Access to Services     RA JORDAN AH: Hadii carissa powero Soterence, waaxda luqadaha, qaybta kaalmada adeegyada, waxay merline salazar. So [facility] [phone].    ATENCIÓN: Si habla español, tiene a kohli disposición servicios gratuitos de asistencia lingüística. Llame al [phone].    We comply with applicable federal civil rights laws and [place] laws. We do not discriminate on the basis of race, color, national origin, age,  disability, sex, sexual orientation, or gender identity.            Thank you!     Thank you for choosing [facility]  for your care. Our goal is always to provide you with excellent care. Hearing back from our patients is one way we can continue to improve our services. Please take a few minutes to complete the written survey that you may receive in the mail after your visit with us. Thank you!             Your Updated Medication List - Protect others around you: Learn how to safely use, store and throw away your medicines at www.disposemymeds.org.          This list is accurate as of 7/6/18 11:20 AM.  Always use your most recent med list.                   Brand Name Dispense Instructions for use Diagnosis    * BASAGLAR 100 UNIT/ML injection     15 mL    Inject 7 units once daily subcutaneously    Type 2 diabetes mellitus (H)       * LANTUS SOLOSTAR 100 UNIT/ML injection   Generic drug:  insulin glargine     15 mL    Take 14 units each morning    Type 2 diabetes mellitus with hyperglycemia, without long-term current use of insulin (H)       blood glucose lancets standard    no brand specified    100 Box    Use to test blood sugar four times daily or as directed.    Type 2 diabetes mellitus without complication, without long-term current use of insulin (H)       blood glucose monitoring lancets     100 each    Use to test blood sugar 2 times daily or as directed.  Ok to substitute alternative if insurance prefers.    Type 2 diabetes mellitus with hyperglycemia, without long-term current use of insulin (H)       * blood glucose monitoring meter device kit    no brand specified    1 kit    Use to test blood sugar four times daily or as directed.    Type 2 diabetes mellitus without complication, without long-term current use of insulin (H)       * blood glucose monitoring meter device kit    no brand specified    1 kit    Use to test blood sugar 4 times daily or as directed.    Type 2 diabetes  mellitus with complication, without long-term current use of insulin (H)       * blood glucose monitoring test strip    no brand specified    100 each    Use to test blood sugars four times daily or as directed    Type 2 diabetes mellitus without complication, without long-term current use of insulin (H)       * blood glucose monitoring test strip    no brand specified    100 strip    1 strip by In Vitro route 4 times daily (before meals and nightly) Use to test blood sugars 4 times daily or as directed    Type 2 diabetes mellitus with complication, without long-term current use of insulin (H)       * blood glucose monitoring test strip    GELACIO CONTOUR NEXT    100 strip    Use to test blood sugar 3 times daily or as directed.  Ok to substitute alternative if insurance prefers.    Type 2 diabetes mellitus with hyperglycemia, without long-term current use of insulin (H)       diphenhydrAMINE 2 % cream    BENADRYL    30 g    Apply topically 3 times daily as needed for itching    Rash       diphenhydrAMINE 25 MG capsule    BENADRYL    30 capsule    Take 1 capsule (25 mg) by mouth every 6 hours as needed for itching or allergies    Rash       Bmjtmlj-Vlznm-Lbanvprv-TenofAF [phone] MG Tabs per tablet    GENVOYA    30 tablet    Take 1 tablet by mouth daily (with breakfast)    AIDS (acquired immune deficiency syndrome) (H)       empagliflozin 10 MG Tabs tablet    JARDIANCE    30 tablet    Take 1 tablet (10 mg) by mouth daily    Type 2 diabetes mellitus with hyperglycemia, without long-term current use of insulin (H)       * insulin pen needle 31G X 8 MM    B-D U/F    100 each    Use 1 pen needles daily or as directed.    Type 2 diabetes mellitus without complication, without long-term current use of insulin (H)       * insulin pen needle 32G X 4 MM    BD SCOTT U/F    100 each    Use 1 daily as directed.    Type 2 diabetes mellitus with hyperglycemia, without long-term current use of insulin (H)       Lactase 9000  units Chew     60 tablet    1-6 tablets by mouth prior to eating/drinking milk product    Food intolerance       ondansetron 4 MG ODT tab    ZOFRAN ODT    10 tablet    Take 1 tablet (4 mg) by mouth every 8 hours as needed        polyethylene glycol powder    MIRALAX    225 g    Take 17 g (1 capful) by mouth daily        povidone-iodine 7.5 % Soln topical solution    APLICARE POVIDONE-IODINE SCRUB    473 mL    Wash extremities 2-3 times per week and let 5min on skin before rinsing  Put on legs and arms 1-2 times per day Cetaphil Lotio    Besnier's prurigo       PRILOSEC PO      Take 10 mg by mouth daily        triamcinolone 0.1 % cream    KENALOG    30 g    Apply topically every evening Put on prurigo lesions for 5 days maximum    Besnier's prurigo       * Notice:  This list has 9 medication(s) that are the same as other medications prescribed for you. Read the directions carefully, and ask your doctor or other care provider to review them with you.

## 2018-07-06 NOTE — NURSING NOTE
Chief Complaint   Patient presents with     Hospital F/U     pt here following a hospital visit       Tarah Scales CMA at 10:28 AM on 7/6/2018.

## 2018-07-08 RX ORDER — LIDOCAINE HYDROCHLORIDE 10 MG/ML
4 INJECTION, SOLUTION EPIDURAL; INFILTRATION; INTRACAUDAL; PERINEURAL
Status: DISCONTINUED | OUTPATIENT
Start: 2018-07-02 | End: 2018-09-13

## 2018-07-08 RX ORDER — TRIAMCINOLONE ACETONIDE 40 MG/ML
40 INJECTION, SUSPENSION INTRA-ARTICULAR; INTRAMUSCULAR
Status: DISCONTINUED | OUTPATIENT
Start: 2018-07-02 | End: 2018-09-13

## 2018-07-08 NOTE — PROGRESS NOTES
[facility] Note      Patient is a 50 year old male who presents to the office today requesting steroid injection into the left knee.  Has known osteoarthritis in the knee and has had steroid injections previously.  Most recently 1/17/2018 which was successful in improving pain up until about 1 week ago.  Pain is slightly better in the morning and gets worse as the day goes on.  Standing or walking for long periods seem to make the pain worse.    Denies weakness, numbness, tingling, clicking, locking, or catching.      Past Medical History, Current Medications, and Allergies are reviewed in the electronic medical record as appropriate.     ROS: Pertinent items are noted in HPI.  Constitutional: negative for fevers, chills and malaise  Cardiovascular: negative for dyspnea, fatigue, lower extremity edema  Integument/breast: negative for rash, skin lesion(s) and skin color change  Neurological: negative for paresthesia and weakness      EXAM:/83  Pulse 98  Wt 176 lb (79.8 kg)  BMI 30.21 kg/m2    Patient is alert, No acute distress, pleasant and conversational.    Gait: nonantalgic. Normal heel toe gait.    left knee:   Skin intact. No erythema or ecchymosis.  No effusion or soft tissue swelling.    AROM: Zero to approximately 135  without restriction or reported pain.    Palpation: No medial or lateral facet joint tenderness.  No posterior medial or posterior lateral joint line tenderness       Special Tests:  None     Full Isometric quad strength, extensor mechanism in place     Neurovascularly intact in the lower extremity    Hip and Ankle with full AROM and nontender      Imaging: X-ray left knee dated 11/22/2016 demonstrates mild DJD particularly in the medial compartment.      Assessment: Patient is a 50 year old male with suspected osteoarthritis in the left knee here for repeat steroid injection.    Recommendations:   Steroid injection performed see note  below.    PROCEDURE: Left knee injection   The patient was apprised of the risks and the benefits of the procedure written consent was signed by the patient.   Landmarks were located on the anterior lateral knee inferior to the patella and the area was marked and cleaned with chlorhexadine swab.   Using no touch technique, the skin was anesthetized with ethyl chloride spray, 40 mg of triamcinolone along with 4 mL of 1% lidocaine  was injected easily through a 1.5-inch 22-gauge needle in the knee.   There were no complications. The patient tolerated the procedure well. There was minimal bleeding.   The patient was instructed to ice the left knee upon leaving clinic and refrain from overuse over the next 2 days.   The patient was instructed to go to the emergency room with any unusual pain, swelling, or redness occurred in the injected area.     Jarocho Alvarado MD

## 2018-07-13 NOTE — TELEPHONE ENCOUNTER
FUTURE VISIT INFORMATION      FUTURE VISIT INFORMATION:    Date: 7/20/18    Time:     [place] [facility] [place]:    Referring provider:  Dr. Rekha Membreno    Referring providers clinic:  [facility]    Reason for visit/diagnosis: Abdominal Pain    RECORDS STATUS      NOTES STATUS DETAILS   OFFICE NOTE from referring provider Internal 7/6/18   OFFICE NOTE from other specialist N/A    DISCHARGE SUMMARY from hospital Internal 6/19/18, 7/3/18, 7/4/18   OPERATIVE REPORT N/A    MEDICATION LIST Internal         ENDOSCOPY  N/A    COLONOSCOPY Internal 1/8/16   ERCP N/A    EUS N/A    STOOL TESTING Internal    PERTINENT LABS Internal    PATHOLOGY REPORTS (RELATED) N/A    IMAGING (CT, MRI, EGD) Internal

## 2018-07-18 ENCOUNTER — TELEPHONE (OUTPATIENT)
Dept: GASTROENTEROLOGY | Facility: CLINIC | Age: 53
End: 2018-07-18

## 2018-07-18 ENCOUNTER — TELEPHONE (OUTPATIENT)
Dept: OPHTHALMOLOGY | Facility: CLINIC | Age: 53
End: 2018-07-18

## 2018-07-19 ENCOUNTER — OFFICE VISIT (OUTPATIENT)
Dept: INFECTIOUS DISEASES | Facility: CLINIC | Age: 53
End: 2018-07-19
Attending: INTERNAL MEDICINE
Payer: COMMERCIAL

## 2018-07-19 ENCOUNTER — OFFICE VISIT (OUTPATIENT)
Dept: PHARMACY | Facility: CLINIC | Age: 53
End: 2018-07-19

## 2018-07-19 VITALS
HEIGHT: 64 IN | SYSTOLIC BLOOD PRESSURE: 133 MMHG | BODY MASS INDEX: 29.88 KG/M2 | WEIGHT: 175 LBS | DIASTOLIC BLOOD PRESSURE: 83 MMHG | TEMPERATURE: 97.2 F | HEART RATE: 90 BPM

## 2018-07-19 DIAGNOSIS — Z21 HIV (HUMAN IMMUNODEFICIENCY VIRUS INFECTION) (H): Primary | ICD-10-CM

## 2018-07-19 DIAGNOSIS — E11.9 TYPE 2 DIABETES MELLITUS WITHOUT COMPLICATION, WITH LONG-TERM CURRENT USE OF INSULIN (H): ICD-10-CM

## 2018-07-19 DIAGNOSIS — Z21 HIV (HUMAN IMMUNODEFICIENCY VIRUS INFECTION) (H): ICD-10-CM

## 2018-07-19 DIAGNOSIS — Z79.4 TYPE 2 DIABETES MELLITUS WITHOUT COMPLICATION, WITH LONG-TERM CURRENT USE OF INSULIN (H): ICD-10-CM

## 2018-07-19 DIAGNOSIS — Z21 HUMAN IMMUNODEFICIENCY VIRUS I INFECTION (H): Primary | ICD-10-CM

## 2018-07-19 LAB
CD3 CELLS # BLD: 1049 CELLS/UL (ref 603–2990)
CD3 CELLS NFR BLD: 46 % (ref 49–84)
CD3+CD4+ CELLS # BLD: 299 CELLS/UL (ref 441–2156)
CD3+CD4+ CELLS NFR BLD: 13 % (ref 28–63)
CD3+CD4+ CELLS/CD3+CD8+ CLL BLD: 0.42 % (ref 1.4–2.6)
CD3+CD8+ CELLS # BLD: 695 CELLS/UL (ref 125–1312)
CD3+CD8+ CELLS NFR BLD: 31 % (ref 10–40)
CREAT UR-MCNC: 62 MG/DL
ERYTHROCYTE [DISTWIDTH] IN BLOOD BY AUTOMATED COUNT: 13.2 % (ref 10–15)
HBA1C MFR BLD: 8.9 % (ref 0–5.6)
HCT VFR BLD AUTO: 44.5 % (ref 40–53)
HGB BLD-MCNC: 15.1 G/DL (ref 13.3–17.7)
IFC SPECIMEN: ABNORMAL
MCH RBC QN AUTO: 30.3 PG (ref 26.5–33)
MCHC RBC AUTO-ENTMCNC: 33.9 G/DL (ref 31.5–36.5)
MCV RBC AUTO: 89 FL (ref 78–100)
MICROALBUMIN UR-MCNC: 6 MG/L
MICROALBUMIN/CREAT UR: 10.28 MG/G CR (ref 0–17)
PLATELET # BLD AUTO: 270 10E9/L (ref 150–450)
RBC # BLD AUTO: 4.99 10E12/L (ref 4.4–5.9)
WBC # BLD AUTO: 6.7 10E9/L (ref 4–11)

## 2018-07-19 PROCEDURE — G0463 HOSPITAL OUTPT CLINIC VISIT: HCPCS | Mod: ZF

## 2018-07-19 PROCEDURE — 86359 T CELLS TOTAL COUNT: CPT

## 2018-07-19 PROCEDURE — 87903 PHENOTYPE DNA HIV W/CULTURE: CPT

## 2018-07-19 PROCEDURE — 87901 NFCT AGT GNTYP ALYS HIV1 REV: CPT

## 2018-07-19 PROCEDURE — 99207 ZZC NO CHARGE LOS: CPT | Performed by: PHARMACIST

## 2018-07-19 PROCEDURE — 86780 TREPONEMA PALLIDUM: CPT

## 2018-07-19 PROCEDURE — 85027 COMPLETE CBC AUTOMATED: CPT

## 2018-07-19 PROCEDURE — 87536 HIV-1 QUANT&REVRSE TRNSCRPJ: CPT

## 2018-07-19 PROCEDURE — 87900 PHENOTYPE INFECT AGENT DRUG: CPT | Mod: XU

## 2018-07-19 PROCEDURE — 86355 B CELLS TOTAL COUNT: CPT

## 2018-07-19 PROCEDURE — 83036 HEMOGLOBIN GLYCOSYLATED A1C: CPT

## 2018-07-19 PROCEDURE — 87906 NFCT AGT GNTYP ALYS HIV1: CPT

## 2018-07-19 PROCEDURE — 86357 NK CELLS TOTAL COUNT: CPT

## 2018-07-19 PROCEDURE — 87904 PHENOTYPE DNA HIV W/CLT ADD: CPT

## 2018-07-19 PROCEDURE — 86360 T CELL ABSOLUTE COUNT/RATIO: CPT

## 2018-07-19 ASSESSMENT — PAIN SCALES - GENERAL: PAINLEVEL: NO PAIN (0)

## 2018-07-19 NOTE — PROGRESS NOTES
[facility] - HIV Progress Note  Dr. Sharon Rosado, [facility], [facility], [address] [place] [address], Sixth Floor, [address]  [place] [address]  Patient:  Andrew Lockwood, Date of birth 11/27/1967, Medical record number [id number]  Date of Visit: Jul 19, 2018         Assessment and Recommendations:     HIV  Mr. Lockwood has struggled with adherence. His viral load was undetectable last check April 2018 how  he reports only taking 50% of his medications since last visit. He says this is because he does not have food to eat with his medications in the morning, related to his homelessness issue (he is living in his truck currently). He is interested in switching to a medication that does not need to be taken with food as he thinks his adherence will be much higher   - will switch patient to Biktarvy (Descovey + Tivicay also an option) with hopes of improved adherence as patient does not need to take food with this medications  - [facility]  - labs today: Viral load (Phenosense ordered as well), CD4, CBC    Preventative health care  Cardiovascular Juan Jose -               Lipids - CHOL 194, LDL 88 (8/11/17)              Blood Pressure -  /81   Endocrine: DMII. Followed by endocrine  Cancer Screening              Colon - Up to date. [facility]   Anal - Anal pap with colorectal 6/22 with [facility]. Patient needs to follow up with colorectal for this.  Immunizations              Hepatitis A - immune, serology 4/13/15              Hepatitis B - 6/16/2016, 3/10/2016, 8/31/2017              Influenza - Up To date              Pneumovax/Prevnar - Up to date               Tdap - 6/8/14 per [facility]              Menactra - Up to date.    Renal Health -  UA without proteinuria and creatinine normal in 7/3/18   Sexually Transmitted Infection Risk and  Screening              Gonorrhea and Chlamydia - GC/chlamydia negative on 2/22/18, declined testing today, he has a boyfriend but says they are not sexually active.               Syphilis - negative 3/2017, will repeat today   Tobacco abuse: patient smokes approx 1 pack / day. He seems interested in quitting. He would like to talk to Keesha about this at some point - however we decided today the priority would be to improve ARV adherence.     Joanne Timmons  Infectious Diseases Fellow     Infectious Disease Clinic Staff Note: Mr. Lockwood was seen, examined, and the case was discussed with Dr. Timmons, ID Fellow -- I agree with her consultative history and examination, assessment and plan in this outpatient ID Consult note. This note reflects my observations and opinions, and the plan outlined fully reflects my approach. I have reviewed the available history, radiology, laboratory results, and reports with the Fellow. I spent more than 15 minutes today in face to face time managing the care of Andrew Lockwood.  Over 50% of my time on the unit was spent counseling the patient and/or coordinating care regarding services listed in this note.    Sharon Rosado MD  Division of Infectious Diseases and International Medicine  [phone]         History of Infectious Disease Illness:   Mr. Lockwood is a 50 year old gentleman who I follow for HIV.      Patient last seen April 2018. Since last visit he reports only 50% adherence to his ARV. He says that this is mainly because he I living out of his car, and he often does not have any food available in the morning jamilah he is supposed to take his medication (which should be taken with food). Then later when he has food, he forgets to take his medications.     He has a boyfriend however states they are not sexually active - says no sexual activity since Dec 2017. He has had several hospitalizations since last visit, the last two visits were related to abdominal pain/diarrhea. This is  thought to be most liekly a food intolerance and he has been started on lactose pills and he has follow up with GI. He has also had issues with hyperglycemia and his DMII meds have recently been changed. He reports ongoing urinary frequency. His skin lesions are still pruritic however improved from previous.     Andrew is still smoking approx 1 pack per day. He is interested in cutting back / quitting. He said the patches have not worked for him in the past. He would like to talk to Keesha at some point about quitting smoking.           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 They did not live together.  Historical use of ARVs: Triumeq, Genvoya,   Historical Resistance Mutations: Susceptible        Past Medical and Surgical History:     Past Medical History:   Diagnosis Date     AIDS (H)      Allergic rhinitis due to other allergen     DNS     Chronic abdominal pain      CNS toxoplasmosis (H)      Diabetes type 2, controlled (H)      GERD (gastroesophageal reflux disease)      HIV (human immunodeficiency virus infection) (H)      Periungual wart      Sleep apnea     doesn't use CPAP       Past Surgical History:   Procedure Laterality Date     C NONSPECIFIC PROCEDURE      right forearm fracture     COLONOSCOPY Left 1/22/2016    Procedure: COMBINED COLONOSCOPY, SINGLE OR MULTIPLE BIOPSY/POLYPECTOMY BY BIOPSY;  Surgeon: Clark Saini MD;  [facility]     HC EXPLORE UNDESC TESTIS,INGUIN/SCROTAL       LAPAROSCOPIC APPENDECTOMY N/A 1/31/2018    Procedure: LAPAROSCOPIC APPENDECTOMY;  LAPAROSCOPIC APPENDECTOMY;  Surgeon: Dawn Holt  MD Carrie;  [facility]     LAPAROSCOPY DIAGNOSTIC (GENERAL) N/A 7/26/2016    Procedure: LAPAROSCOPY DIAGNOSTIC (GENERAL);  Surgeon: Susannah Arriaga MD;  [facility]     LAPAROSCOPY DIAGNOSTIC (GENERAL) N/A 4/16/2018    Procedure: LAPAROSCOPY DIAGNOSTIC (GENERAL);  Diagnostic laparoscopy and lysis of adhesions;  Surgeon: Prince Dowling MD;  [facility]     OPTICAL TRACKING SYSTEM CRANIOTOMY, EXCISE TUMOR, COMBINED Left 4/10/2015    Procedure: COMBINED OPTICAL TRACKING SYSTEM CRANIOTOMY, EXCISE TUMOR;  Surgeon: Mirlande Colmenares MD;  [facility]     REPAIR GAMEKEEPER'S THUMB Right 12/2/2016    Procedure: REPAIR LIGAMENT ULNAR COLLATERAL THUMB (GAMEKEEPER'S);  Surgeon: Evin Zamorano MD;  [facility]           Family History:     Family History   Problem Relation Age of Onset     Diabetes Brother      Diabetes Father      Alzheimer Disease Father      Unknown/Adopted Mother      Diabetes Paternal Grandfather      Cancer No family hx of      no skin cancer     Skin Cancer No family hx of      no famiy hx of skin cancer     Glaucoma No family hx of      Macular Degeneration No family hx of            Social History:     Social History   Substance Use Topics     Smoking status: Current Every Day Smoker     Packs/day: 0.50     Types: Cigarettes     Last attempt to quit: 10/28/2016     Smokeless tobacco: Former User      Comment: 4 cigarettes     Alcohol use No      Comment: Last etoh in 2007     Social History     Social History Narrative    Born in [facility].  [place]ame to the [place] in 1993.  Last traveled to visit family in 2008.                 Review of Systems:   CONSTITUTIONAL: negative fevers or chills   GASTROINTESTINAL: negative for nausea, vomiting, diarrhea or constipation   INTEGUMENT: + for rash or pruritus. Thought to be related to reaction from bug bites  NEURO: Negative for headache, vision changes         Current Medications:     Current Outpatient Prescriptions    Medication Sig Dispense Refill     blood glucose (NO BRAND SPECIFIED) lancets standard Use to test blood sugar four times daily or as directed. 100 Box 5     blood glucose monitoring (GELACIO CONTOUR NEXT) test strip Use to test blood sugar 3 times daily or as directed.  Ok to substitute alternative if insurance prefers. 100 strip 11     blood glucose monitoring (GELACIO MICROLET) lancets Use to test blood sugar 2 times daily or as directed.  Ok to substitute alternative if insurance prefers. 100 each 11     blood glucose monitoring (NO BRAND SPECIFIED) meter device kit Use to test blood sugar 4 times daily or as directed. 1 kit 0     blood glucose monitoring (NO BRAND SPECIFIED) meter device kit Use to test blood sugar four times daily or as directed. 1 kit 0     blood glucose monitoring (NO BRAND SPECIFIED) test strip 1 strip by In Vitro route 4 times daily (before meals and nightly) Use to test blood sugars 4 times daily or as directed 100 strip 11     blood glucose monitoring (NO BRAND SPECIFIED) test strip Use to test blood sugars four times daily or as directed 100 each 5     GENVOYA [phone] mg tablet Take 1 tablet by mouth daily (with breakfast) 30 tablet 0     insulin glargine (LANTUS SOLOSTAR) 100 UNIT/ML pen Take 14 units each morning 15 mL 3     insulin pen needle (B-D U/F) 31G X 8 MM Use 1 pen needles daily or as directed. 100 each 0     insulin pen needle (BD SCOTT U/F) 32G X 4 MM Use 1 daily as directed. 100 each 11     Omeprazole (PRILOSEC PO) Take 10 mg by mouth daily       BASAGLAR 100 UNIT/ML injection Inject 7 units once daily subcutaneously 15 mL 3            Immunization History:     Immunization History   Administered Date(s) Administered     HepB 03/10/2016, 06/16/2016     HepB-Adult 08/31/2017     Influenza (IIV3) PF 10/17/2016     Influenza Vaccine IM 3yrs+ 4 Valent IIV4 12/22/2015, 12/14/2017     Mantoux Tuberculin Skin Test 04/12/2015     Meningococcal (Menactra ) 08/31/2015,  "03/10/2016     Pneumo Conj 13-V (2010&after) 03/10/2016     Pneumococcal 23 valent 06/16/2016     TDAP Vaccine (Boostrix) 10/17/2016            Allergies:     Allergies   Allergen Reactions     Metformin      Abdominal pain     Milk [Lac Bovis] Hives     Tylenol [Acetaminophen] Itching     Dulaglutide Rash            Physical Exam:   /83  Pulse 90  Temp 97.2  F (36.2  C) (Oral)  Ht 1.626 m (5' 4\")  Wt 79.4 kg (175 lb)  BMI 30.04 kg/m2  Physical Examination:  GENERAL:  well-developed, well-nourished, seated in no acute distress.  HEENT:  Head is normocephalic, atraumatic   EYES:  Eyes have anicteric sclerae without conjunctival injection   AB: obese.   SKIN:  Scattered healing lesions on extremities.   NEUROLOGIC:  Grossly nonfocal. Active x4 extremities          Laboratory Data:     CD4 Count  Absolute CD4   Date Value Ref Range Status   04/26/2018 299 (L) 441 - 2156 cells/uL Final     CD4 [place] T   Date Value Ref Range Status   04/26/2018 11 (L) 28 - 63 % Final     CD4:CD8 Ratio   Date Value Ref Range Status   04/26/2018 0.31 (L) 1.40 - 2.60 Final       HIV-1 RNA Quantitative:  HIV-1 RNA Quant Result   Date Value Ref Range Status   04/26/2018 HIV-1 RNA Not Detected HIVND^HIV-1 RNA Not Detected [Copies]/mL Final     Comment:     The DAMON AmpliPrep/DAMON TaqMan HIV-1 test is an FDA-approved in vitro   nucleic acid amplification test for the quantitation of HIV-1 RNA in human   plasma (EDTA plasma) using the DAMON AmpliPrep instrument for automated viral   nucleic acid extraction and the DAMON TaqMan Analyzer or DAMON TaqMan for   automated Real Time PCR amplification and detection of the viral nucleic acid   target.  Titer results are reported in copies/ml. This assay is intended for use in   conjunction with clinical presentation and other laboratory markers of disease   prognosis and for use as an aid in assessing viral response to antiretroviral   treatment as measured by changes in plasma HIV-1 RNA " levels. This test should   not be used as a donor screening test to confirm the presence of HIV-1   infection.         Metabolic Studies       Sodium   Date Value Ref Range Status   07/04/2018 134 133 - 144 mmol/L Final   07/03/2018 139 133 - 144 mmol/L Final     Potassium   Date Value Ref Range Status   07/04/2018 4.3 3.4 - 5.3 mmol/L Final     Comment:     Specimen slightly hemolyzed, potassium may be falsely elevated   07/03/2018 3.4 3.4 - 5.3 mmol/L Final     Chloride   Date Value Ref Range Status   07/04/2018 101 94 - 109 mmol/L Final   07/03/2018 102 94 - 109 mmol/L Final     Carbon Dioxide   Date Value Ref Range Status   07/04/2018 24 20 - 32 mmol/L Final   07/03/2018 25 20 - 32 mmol/L Final     Anion Gap   Date Value Ref Range Status   07/04/2018 9 3 - 14 mmol/L Final   07/03/2018 12 3 - 14 mmol/L Final     Urea Nitrogen   Date Value Ref Range Status   07/04/2018 16 7 - 30 mg/dL Final   07/03/2018 11 7 - 30 mg/dL Final     Creatinine   Date Value Ref Range Status   07/04/2018 0.77 0.66 - 1.25 mg/dL Final   07/03/2018 0.88 0.66 - 1.25 mg/dL Final     GFR Estimate   Date Value Ref Range Status   07/04/2018 >90 >60 mL/min/1.7m2 Final     Comment:     Non  GFR Calc   07/03/2018 >90 >60 mL/min/1.7m2 Final     Comment:     Non  GFR Calc     Glucose   Date Value Ref Range Status   07/04/2018 210 (H) 70 - 99 mg/dL Final   07/03/2018 216 (H) 70 - 99 mg/dL Final     Hemoglobin A1C   Date Value Ref Range Status   08/11/2017 6.0 4.3 - 6.0 % Final     Calcium   Date Value Ref Range Status   07/04/2018 9.5 8.5 - 10.1 mg/dL Final   07/03/2018 10.0 8.5 - 10.1 mg/dL Final     Phosphorus   Date Value Ref Range Status   04/18/2018 2.8 2.5 - 4.5 mg/dL Final   04/17/2018 2.9 2.5 - 4.5 mg/dL Final     Magnesium   Date Value Ref Range Status   04/18/2018 2.0 1.6 - 2.3 mg/dL Final   04/17/2018 1.9 1.6 - 2.3 mg/dL Final     Lactic Acid   Date Value Ref Range Status   04/16/2018 1.0 0.7 - 2.0 mmol/L  Final   01/24/2018 1.1 0.4 - 2.0 mmol/L Final       Hepatic Studies    Bilirubin Total   Date Value Ref Range Status   07/04/2018 0.9 0.2 - 1.3 mg/dL Final   07/03/2018 0.8 0.2 - 1.3 mg/dL Final     Alkaline Phosphatase   Date Value Ref Range Status   07/04/2018 107 40 - 150 U/L Final   07/03/2018 124 40 - 150 U/L Final     Albumin   Date Value Ref Range Status   07/04/2018 3.4 3.4 - 5.0 g/dL Final   07/03/2018 4.3 3.4 - 5.0 g/dL Final     AST   Date Value Ref Range Status   07/04/2018 27 0 - 45 U/L Final     Comment:     Specimen is hemolyzed which can falsely elevate AST. Analysis of a   non-hemolyzed specimen may result in a lower value.     07/03/2018 13 0 - 45 U/L Final     ALT   Date Value Ref Range Status   07/04/2018 23 0 - 70 U/L Final   07/03/2018 28 0 - 70 U/L Final       Hematology Studies      WBC   Date Value Ref Range Status   07/04/2018 9.5 4.0 - 11.0 10e9/L Final   07/03/2018 11.5 (H) 4.0 - 11.0 10e9/L Final     Absolute Neutrophil   Date Value Ref Range Status   07/04/2018 6.0 1.6 - 8.3 10e9/L Final   07/03/2018 8.1 1.6 - 8.3 10e9/L Final     Absolute Lymphocytes   Date Value Ref Range Status   07/04/2018 2.5 0.8 - 5.3 10e9/L Final   07/03/2018 2.4 0.8 - 5.3 10e9/L Final     Absolute Monocytes   Date Value Ref Range Status   07/04/2018 0.6 0.0 - 1.3 10e9/L Final   07/03/2018 0.8 0.0 - 1.3 10e9/L Final     Absolute Eosinophils   Date Value Ref Range Status   07/04/2018 0.3 0.0 - 0.7 10e9/L Final   07/03/2018 0.1 0.0 - 0.7 10e9/L Final     Hemoglobin   Date Value Ref Range Status   07/04/2018 14.7 13.3 - 17.7 g/dL Final   07/03/2018 16.0 13.3 - 17.7 g/dL Final     Hematocrit   Date Value Ref Range Status   07/04/2018 42.7 40.0 - 53.0 % Final   07/03/2018 45.6 40.0 - 53.0 % Final     Platelet Count   Date Value Ref Range Status   07/04/2018 229 150 - 450 10e9/L Final   07/03/2018 256 150 - 450 10e9/L Final       Hepatitis B Testing     Recent Labs   Lab Test  04/13/15   0816   HBCAB  Nonreactive    HEPBANG  Nonreactive   HBCM  Nonreactive   A nonreactive result suggests lack of recent exposure to the virus in the   preceding 6 months.     HBEABY  Negative  Reference range: Negative  (Note)  Performed by [facility],  [address] [place] [address] [phone]  [email or web address], Nayan Corley MD, Lab. Director     HBEAGN  Negative  Reference range: Negative  (Note)  Performed by [facility],  [address] [place] [address] [phone]  www.[email or web address], Nayan Corley MD, Lab. Director         Hepatitis C Testing     Hepatitis C Antibody   Date Value Ref Range Status   03/02/2017  NR Final    Nonreactive   Assay performance characteristics have not been established for newborns,   infants, and children     04/13/2015  NR Final    Nonreactive   Assay performance characteristics have not been established for newborns,   infants, and children       Imaging:  No results found for this or any previous visit (from the past 744 hour(s)).

## 2018-07-19 NOTE — MR AVS SNAPSHOT
After Visit Summary   7/19/2018    Andrew Lockwood    MRN: [id number]           Patient Information     Date Of Birth          11/27/1967        Visit Information        Provider Department      7/19/2018 9:30 AM Sharon Rosado MD [facility] and Infectious Diseases        Today's Diagnoses     HIV (human immunodeficiency virus infection) (H)    -  1       Follow-ups after your visit        Your next 10 appointments already scheduled     Aug 08, 2018  3:00 PM CDT   (Arrive by 2:45 PM)   New Patient Visit with Judson Allen MD   [facility] ([facility])    [address] [place] [address]  [address] [facility] [address]   [phone]            Aug 09, 2018 10:30 AM CDT   (Arrive by 10:15 AM)   Office Visit with Keesha Zleaya RPH   [facility] and Infectious Diseases [facility] ([facility])    [address] [place] [address]  [address]  [facility] [id number] [phone]           Bring a current list of meds and any records pertaining to this visit. For Physicals, please bring immunization records and any forms needing to be filled out. Please arrive 10 minutes early to complete paperwork.            Aug 15, 2018  1:00 PM CDT   (Arrive by 12:45 PM)   Return Visit with Florence Carbajal MD   [facility] and Infectious Diseases ([facility])    [address] [place] [address]  [address]  [facility] [address]   [phone]            Aug 21, 2018  3:00 PM CDT   (Arrive by 2:45 PM)   CONSULT with Mick Young MD   [facility] Dermatologic Surgery ([facility])    [address] [place] [address]  [address] [facility] [address]   [phone]            Oct 08, 2018  1:00 PM CDT   (Arrive by 12:45 PM)   Return Visit with Rekha Membreno MD   [facility] ([facility])    [address] [place] [address]  4th [facility]  "[id number] [phone]              Who to contact     If you have questions or need follow up information about today's clinic visit or your schedule please contact [facility] AND INFECTIOUS DISEASES directly at [phone].  Normal or non-critical lab and imaging results will be communicated to you by MyChart, letter or phone within 4 business days after the clinic has received the results. If you do not hear from us within 7 days, please contact the clinic through [employer] or phone. If you have a critical or abnormal lab result, we will notify you by phone as soon as possible.  Submit refill requests through [employer] or call your pharmacy and they will forward the refill request to us. Please allow 3 business days for your refill to be completed.          Additional Information About Your Visit        [employer] Information     [employer] gives you secure access to your electronic health record. If you see a primary care provider, you can also send messages to your care team and make appointments. If you have questions, please call your primary care clinic.  If you do not have a primary care provider, please call [phone] and they will assist you.        Care EveryWhere ID     This is your Care EveryWhere ID. This could be used by other organizations to access your [place] medical records  [id number]        Your Vitals Were     Pulse Temperature Height BMI (Body Mass Index)          90 97.2  F (36.2  C) (Oral) 1.626 m (5' 4\") 30.04 kg/m2         Blood Pressure from Last 3 Encounters:   08/02/18 131/86   07/27/18 120/85   07/19/18 133/83    Weight from Last 3 Encounters:   07/19/18 79.4 kg (175 lb)   07/06/18 79 kg (174 lb 1.6 oz)   07/04/18 79.8 kg (176 lb)                 Today's Medication Changes          These changes are accurate as of 7/19/18 11:59 PM.  If you have any questions, ask your nurse or doctor.               Start taking these medicines.        Dose/Directions    " Bictegravir-Emtricitab-Tenofov -25 MG Tabs   Used for:  HIV (human immunodeficiency virus infection) (H)   Started by:  Sharon Rosado MD        Dose:  1 tablet   Take 1 tablet by mouth daily   Quantity:  30 tablet   Refills:  11         Stop taking these medicines if you haven't already. Please contact your care team if you have questions.     diphenhydrAMINE 2 % cream   Commonly known as:  BENADRYL   Stopped by:  Sharon Rosado MD           diphenhydrAMINE 25 MG capsule   Commonly known as:  BENADRYL   Stopped by:  Sharon Rosado MD           empagliflozin 10 MG Tabs tablet   Commonly known as:  JARDIANCE   Stopped by:  Sharon Rosado MD           Lactase 9000 units Chew   Stopped by:  Sharon Rosado MD           polyethylene glycol powder   Commonly known as:  MIRALAX   Stopped by:  Sharon Rosado MD           povidone-iodine 7.5 % Soln topical solution   Commonly known as:  APLICARE POVIDONE-IODINE SCRUB   Stopped by:  Sharon Rosado MD           triamcinolone 0.1 % cream   Commonly known as:  KENALOG   Stopped by:  Sharon Rosado MD                Where to get your medicines      These medications were sent to [facility] [address] [address]  [address] [address]    Hours:  TRANSPLANT PHONE NUMBER [phone] Phone:  [phone]     Bictegravir-Emtricitab-Tenofov -25 MG Tabs                Primary Care Provider Office Phone # Fax #    Rubio Cronin [phone]       [facility] [address] [facility] [address]  [facility] [address]        Equal Access to Services     RA JORDAN AH: Apollo stephens Soterence, waaxda luqadaha, qaybta kaalmada adeegyada, dez salazar. So [facility] [phone].    ATENCIÓN: Si habla español, cuca a kohli disposición  servicios gratuitos de asistencia lingüística. Gurdeep davila [phone].    We comply with applicable federal civil rights laws and [place] laws. We do not discriminate on the basis of race, color, national origin, age, disability, sex, sexual orientation, or gender identity.            Thank you!     Thank you for choosing [facility] AND INFECTIOUS DISEASES  for your care. Our goal is always to provide you with excellent care. Hearing back from our patients is one way we can continue to improve our services. Please take a few minutes to complete the written survey that you may receive in the mail after your visit with us. Thank you!             Your Updated Medication List - Protect others around you: Learn how to safely use, store and throw away your medicines at www.disposemymeds.org.          This list is accurate as of 7/19/18 11:59 PM.  Always use your most recent med list.                   Brand Name Dispense Instructions for use Diagnosis    * BASAGLAR 100 UNIT/ML injection     15 mL    Inject 7 units once daily subcutaneously    Type 2 diabetes mellitus (H)       * LANTUS SOLOSTAR 100 UNIT/ML injection   Generic drug:  insulin glargine     15 mL    Take 14 units each morning    Type 2 diabetes mellitus with hyperglycemia, without long-term current use of insulin (H)       Bictegravir-Emtricitab-Tenofov -25 MG Tabs     30 tablet    Take 1 tablet by mouth daily    HIV (human immunodeficiency virus infection) (H)       blood glucose lancets standard    no brand specified    100 Box    Use to test blood sugar four times daily or as directed.    Type 2 diabetes mellitus without complication, without long-term current use of insulin (H)       blood glucose monitoring lancets     100 each    Use to test blood sugar 2 times daily or as directed.  Ok to substitute alternative if insurance prefers.    Type 2 diabetes mellitus with hyperglycemia, without long-term current use of insulin (H)       *  blood glucose monitoring meter device kit    no brand specified    1 kit    Use to test blood sugar four times daily or as directed.    Type 2 diabetes mellitus without complication, without long-term current use of insulin (H)       * blood glucose monitoring meter device kit    no brand specified    1 kit    Use to test blood sugar 4 times daily or as directed.    Type 2 diabetes mellitus with complication, without long-term current use of insulin (H)       * blood glucose monitoring test strip    no brand specified    100 each    Use to test blood sugars four times daily or as directed    Type 2 diabetes mellitus without complication, without long-term current use of insulin (H)       * blood glucose monitoring test strip    no brand specified    100 strip    1 strip by In Vitro route 4 times daily (before meals and nightly) Use to test blood sugars 4 times daily or as directed    Type 2 diabetes mellitus with complication, without long-term current use of insulin (H)       * blood glucose monitoring test strip    GELACIO CONTOUR NEXT    100 strip    Use to test blood sugar 3 times daily or as directed.  Ok to substitute alternative if insurance prefers.    Type 2 diabetes mellitus with hyperglycemia, without long-term current use of insulin (H)       Amtgfwa-Anpsa-Jtpsxgce-TenofAF [phone] MG Tabs per tablet    GENVOYA    30 tablet    Take 1 tablet by mouth daily (with breakfast)    AIDS (acquired immune deficiency syndrome) (H)       * insulin pen needle 31G X 8 MM    B-D U/F    100 each    Use 1 pen needles daily or as directed.    Type 2 diabetes mellitus without complication, without long-term current use of insulin (H)       * insulin pen needle 32G X 4 MM    BD SCOTT U/F    100 each    Use 1 daily as directed.    Type 2 diabetes mellitus with hyperglycemia, without long-term current use of insulin (H)       * Notice:  This list has 9 medication(s) that are the same as other medications prescribed for  you. Read the directions carefully, and ask your doctor or other care provider to review them with you.

## 2018-07-19 NOTE — PROGRESS NOTES
Clinical Consult:                                                    Andrew Lockwood is a 50 year old male coming in for a clinical pharmacist consult.  He was referred to me from Dr Rosado. No MTM today due to time. We will do full MTM on 07/24.     Reason for Consult: Change in HIV medication/adherence.    Discussion: Andrew reports he is missing many doses of Genvoya because he needs to take it after food, and he does not always have food. Reports Dr. Rosado changed his medication to Biktarvy, as this does not have to be taken with food. Andrew reports he will come to clinic once per week to  a one week med box and discuss adherence. Reports he also wants to talk about how he can quit smoking, when he returns on 07/24.    Plan:  1. Discussed the importance of strict medication adherence.  2. Pt will return on 07/24 for a one week med box/smoking cessation counseling.  3. MTM appt on 07/24.  4. Discussed dosing with Biktarvy. (it is covered by his insurance).       Keesha Zelaya, MTM Pharmacist.   [phone]

## 2018-07-19 NOTE — NURSING NOTE
"/83  Pulse 90  Temp 97.2  F (36.2  C) (Oral)  Ht 1.626 m (5' 4\")  Wt 79.4 kg (175 lb)  BMI 30.04 kg/m2  Chief Complaint   Patient presents with     RECHECK     follow up with B20, tzimmer cma       "

## 2018-07-19 NOTE — MR AVS SNAPSHOT
After Visit Summary   7/19/2018    Andrew Lockwood    MRN: [id number]           Patient Information     Date Of Birth          11/27/1967        Visit Information        Provider Department      7/19/2018 11:00 AM Keesha Zelaya RPH [facility] and Infectious Diseases [facility]        Today's Diagnoses     Human immunodeficiency virus I infection (H)    -  1       Follow-ups after your visit        Your next 10 appointments already scheduled     Jul 20, 2018 11:20 AM CDT   (Arrive by 11:05 AM)   New Patient Visit with Audrey Simmons MD   [facility]ology [facility] ([facility])    [address] [facility]  4th Floor  [facility] [id number] [phone]            Jul 24, 2018 11:00 AM CDT   (Arrive by 10:45 AM)   Office Visit with Keesha Zelaya RPH   [facility] and Infectious Diseases [facility] ([facility])    [address] [place] [address]  [address]  [facility] [address] [phone]           Bring a current list of meds and any records pertaining to this visit. For Physicals, please bring immunization records and any forms needing to be filled out. Please arrive 10 minutes early to complete paperwork.            Aug 07, 2018 11:00 AM CDT   (Arrive by 10:45 AM)   RETURN DIABETES with Lenora Haley PA-C   [facility] Endocrinology ([facility])    [address] [place] [address]  3rd Floor  [facility] [id number] [phone]            Oct 08, 2018  1:00 PM CDT   (Arrive by 12:45 PM)   Return Visit with Rekha Membreno MD   [facility] ([facility])    [address] [place] [address]  4th Floor  [facility] [id number] [phone]              Who to contact     If you have questions or need follow up information about today's clinic visit or your schedule please contact [facility] AND INFECTIOUS DISEASES [facility] directly at [phone].  Normal or  non-critical lab and imaging results will be communicated to you by MyChart, letter or phone within 4 business days after the clinic has received the results. If you do not hear from us within 7 days, please contact the clinic through [employer] or phone. If you have a critical or abnormal lab result, we will notify you by phone as soon as possible.  Submit refill requests through [employer]) or call your pharmacy and they will forward the refill request to us. Please allow 3 business days for your refill to be completed.          Additional Information About Your Visit        [employer] Information     [employer] gives you secure access to your electronic health record. If you see a primary care provider, you can also send messages to your care team and make appointments. If you have questions, please call your primary care clinic.  If you do not have a primary care provider, please call [phone] and they will assist you.        Care EveryWhere ID     This is your Care EveryWhere ID. This could be used by other organizations to access your [place] medical records  [id number]         Blood Pressure from Last 3 Encounters:   07/19/18 133/83   07/06/18 116/73   07/04/18 103/75    Weight from Last 3 Encounters:   07/19/18 175 lb (79.4 kg)   07/06/18 174 lb 1.6 oz (79 kg)   07/04/18 176 lb (79.8 kg)              Today, you had the following     No orders found for display         Today's Medication Changes          These changes are accurate as of 7/19/18 12:39 PM.  If you have any questions, ask your nurse or doctor.               Start taking these medicines.        Dose/Directions    Bictegravir-Emtricitab-Tenofov -25 MG Tabs   Used for:  HIV (human immunodeficiency virus infection) (H)   Started by:  Sharon Rosado MD        Dose:  1 tablet   Take 1 tablet by mouth daily   Quantity:  30 tablet   Refills:  11         Stop taking these medicines if you haven't already. Please contact your care team if  you have questions.     diphenhydrAMINE 2 % cream   Commonly known as:  BENADRYL   Stopped by:  Sharon Rosado MD           diphenhydrAMINE 25 MG capsule   Commonly known as:  BENADRYL   Stopped by:  Sharon Rosado MD           empagliflozin 10 MG Tabs tablet   Commonly known as:  JARDIANCE   Stopped by:  Sharon Rosado MD           Lactase 9000 units Chew   Stopped by:  Sharon Rosado MD           polyethylene glycol powder   Commonly known as:  MIRALAX   Stopped by:  Sharon Rosado MD           povidone-iodine 7.5 % Soln topical solution   Commonly known as:  APLICARE POVIDONE-IODINE SCRUB   Stopped by:  Sharon Rosado MD           triamcinolone 0.1 % cream   Commonly known as:  KENALOG   Stopped by:  Sharon Rosado MD                Where to get your medicines      These medications were sent to [address] [facility] [address]  [address] [facility] [address] [place] [address]    Hours:  TRANSPLANT PHONE NUMBER [phone] Phone:  [phone]     Bictegravir-Emtricitab-Tenofov -25 MG Tabs                Primary Care Provider Office Phone # Fax #    Rubioni Cronin [phone]       [facility] [address] [facility] [address]  [facility] [address]        Equal Access to Services     RA JORDAN AH: Hadii aad ku hadasho Soomaali, waaxda luqadaha, qaybta kaalmada adeegyada, waxay idiin hayaan tristin salazar. So [facility] [phone].    ATENCIÓN: Si habla español, tiene a kohli disposición servicios gratuitos de asistencia lingüística. Llame al [phone].    We comply with applicable federal civil rights laws and [place] laws. We do not discriminate on the basis of race, color, national origin, age, disability, sex, sexual orientation, or gender identity.            Thank you!     Thank you for choosing [facility] AND  INFECTIOUS DISEASES MTM  for your care. Our goal is always to provide you with excellent care. Hearing back from our patients is one way we can continue to improve our services. Please take a few minutes to complete the written survey that you may receive in the mail after your visit with us. Thank you!             Your Updated Medication List - Protect others around you: Learn how to safely use, store and throw away your medicines at www.disposemymeds.org.          This list is accurate as of 7/19/18 12:39 PM.  Always use your most recent med list.                   Brand Name Dispense Instructions for use Diagnosis    * BASAGLAR 100 UNIT/ML injection     15 mL    Inject 7 units once daily subcutaneously    Type 2 diabetes mellitus (H)       * LANTUS SOLOSTAR 100 UNIT/ML injection   Generic drug:  insulin glargine     15 mL    Take 14 units each morning    Type 2 diabetes mellitus with hyperglycemia, without long-term current use of insulin (H)       Bictegravir-Emtricitab-Tenofov -25 MG Tabs     30 tablet    Take 1 tablet by mouth daily    HIV (human immunodeficiency virus infection) (H)       blood glucose lancets standard    no brand specified    100 Box    Use to test blood sugar four times daily or as directed.    Type 2 diabetes mellitus without complication, without long-term current use of insulin (H)       blood glucose monitoring lancets     100 each    Use to test blood sugar 2 times daily or as directed.  Ok to substitute alternative if insurance prefers.    Type 2 diabetes mellitus with hyperglycemia, without long-term current use of insulin (H)       * blood glucose monitoring meter device kit    no brand specified    1 kit    Use to test blood sugar four times daily or as directed.    Type 2 diabetes mellitus without complication, without long-term current use of insulin (H)       * blood glucose monitoring meter device kit    no brand specified    1 kit    Use to test blood sugar 4 times daily  or as directed.    Type 2 diabetes mellitus with complication, without long-term current use of insulin (H)       * blood glucose monitoring test strip    no brand specified    100 each    Use to test blood sugars four times daily or as directed    Type 2 diabetes mellitus without complication, without long-term current use of insulin (H)       * blood glucose monitoring test strip    no brand specified    100 strip    1 strip by In Vitro route 4 times daily (before meals and nightly) Use to test blood sugars 4 times daily or as directed    Type 2 diabetes mellitus with complication, without long-term current use of insulin (H)       * blood glucose monitoring test strip    GELACIO CONTOUR NEXT    100 strip    Use to test blood sugar 3 times daily or as directed.  Ok to substitute alternative if insurance prefers.    Type 2 diabetes mellitus with hyperglycemia, without long-term current use of insulin (H)       Alscpdw-Melgq-Repauhjp-TenofAF [phone] MG Tabs per tablet    GENVOYA    30 tablet    Take 1 tablet by mouth daily (with breakfast)    AIDS (acquired immune deficiency syndrome) (H)       * insulin pen needle 31G X 8 MM    B-D U/F    100 each    Use 1 pen needles daily or as directed.    Type 2 diabetes mellitus without complication, without long-term current use of insulin (H)       * insulin pen needle 32G X 4 MM    BD SCOTT U/F    100 each    Use 1 daily as directed.    Type 2 diabetes mellitus with hyperglycemia, without long-term current use of insulin (H)       PRILOSEC PO      Take 10 mg by mouth daily        * Notice:  This list has 9 medication(s) that are the same as other medications prescribed for you. Read the directions carefully, and ask your doctor or other care provider to review them with you.

## 2018-07-19 NOTE — LETTER
7/19/2018      RE: Andrew Lockwood  [address]  [facility] [address]       -    [facility] - [facility] Progress Note  Dr. Sharon Rosado, [facility], [facility], [address] [place] [address], Sixth Floor, [address]  [place] [address]  Patient:  Andrew Lockwood, Date of birth 11/27/1967, Medical record number [id number]  Date of Visit: Jul 19, 2018         Assessment and Recommendations:     HIV  Mr. Lockwood has struggled with adherence. His viral load was undetectable last check April 2018 how  he reports only taking 50% of his medications since last visit. He says this is because he does not have food to eat with his medications in the morning, related to his homelessness issue (he is living in his truck currently). He is interested in switching to a medication that does not need to be taken with food as he thinks his adherence will be much higher   - will switch patient to Biktarvy (Descovey + Tivicay also an option) with hopes of improved adherence as patient does not need to take food with this medications  - patent will come in weekly for medications and adherence check ([facility])  - labs today: Viral load (Phenosense ordered as well), CD4, CBC    Preventative health care  Cardiovascular Juan Jose -               Lipids - CHOL 194, LDL 88 (8/11/17)              Blood Pressure -  /81   Endocrine: DMII. Followed by endocrine  Cancer Screening              Colon - Up to date. [facility]   Anal - Anal pap with colorectal 6/22 with [facility]. Patient needs to follow up with colorectal for this.  Immunizations              Hepatitis A - immune, serology 4/13/15              Hepatitis B - 6/16/2016, 3/10/2016, 8/31/2017              Influenza - Up To date              Pneumovax/Prevnar - Up to date               Tdap - 6/8/14 per [facility]              Menactra - Up to date.    Renal Health -  UA without proteinuria and  creatinine normal in 7/3/18   Sexually Transmitted Infection Risk and Screening              Gonorrhea and Chlamydia - GC/chlamydia negative on 2/22/18, declined testing today, he has a boyfriend but says they are not sexually active.               Syphilis - negative 3/2017, will repeat today   Tobacco abuse: patient smokes approx 1 pack / day. He seems interested in quitting. He would like to talk to Keesha about this at some point - however we decided today the priority would be to improve ARV adherence.     Joanne Timmons  Infectious Diseases Fellow     Infectious Disease Clinic Staff Note: Mr. Lockwood was seen, examined, and the case was discussed with Dr. Timmons, ID Fellow -- I agree with her consultative history and examination, assessment and plan in this outpatient ID Consult note. This note reflects my observations and opinions, and the plan outlined fully reflects my approach. I have reviewed the available history, radiology, laboratory results, and reports with the Fellow. I spent more than 15 minutes today in face to face time managing the care of Andrew Lockwood.  Over 50% of my time on the unit was spent counseling the patient and/or coordinating care regarding services listed in this note.    Sharon Rosado MD  Division of Infectious Diseases and International Medicine  [phone]         History of Infectious Disease Illness:   Mr. Lockwood is a 50 year old gentleman who I follow for HIV.      Patient last seen April 2018. Since last visit he reports only 50% adherence to his ARV. He says that this is mainly because he I living out of his car, and he often does not have any food available in the morning jamilah he is supposed to take his medication (which should be taken with food). Then later when he has food, he forgets to take his medications.     He has a boyfriend however states they are not sexually active - says no sexual activity since Dec 2017. He has had several hospitalizations since last visit,  the last two visits were related to abdominal pain/diarrhea. This is thought to be most liekly a food intolerance and he has been started on lactose pills and he has follow up with GI. He has also had issues with hyperglycemia and his DMII meds have recently been changed. He reports ongoing urinary frequency. His skin lesions are still pruritic however improved from previous.     Andrew is still smoking approx 1 pack per day. He is interested in cutting back / quitting. He said the patches have not worked for him in the past. He would like to talk to Keesha at some point about quitting smoking.           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 They did not live together.  Historical use of ARVs: Triumeq, Genvoya,   Historical Resistance Mutations: Susceptible        Past Medical and Surgical History:     Past Medical History:   Diagnosis Date     AIDS (H)      Allergic rhinitis due to other allergen     DNS     Chronic abdominal pain      CNS toxoplasmosis (H)      Diabetes type 2, controlled (H)      GERD (gastroesophageal reflux disease)      HIV (human immunodeficiency virus infection) (H)      Periungual wart      Sleep apnea     doesn't use CPAP       Past Surgical History:   Procedure Laterality Date     C NONSPECIFIC PROCEDURE      right forearm fracture     COLONOSCOPY Left 1/22/2016    Procedure: COMBINED COLONOSCOPY, SINGLE OR MULTIPLE BIOPSY/POLYPECTOMY BY BIOPSY;  Surgeon: Clark Saini MD;  Location: [facility]     HC EXPLORE UNDESC TESTIS,INGUIN/SCROTAL       LAPAROSCOPIC APPENDECTOMY N/A 1/31/2018    Procedure: LAPAROSCOPIC  APPENDECTOMY;  LAPAROSCOPIC APPENDECTOMY;  Surgeon: Dawn Holt MD;  Location: [facility] OR     LAPAROSCOPY DIAGNOSTIC (GENERAL) N/A 7/26/2016    Procedure: LAPAROSCOPY DIAGNOSTIC (GENERAL);  Surgeon: Susannah Arriaga MD;  Location: [facility] OR     LAPAROSCOPY DIAGNOSTIC (GENERAL) N/A 4/16/2018    Procedure: LAPAROSCOPY DIAGNOSTIC (GENERAL);  Diagnostic laparoscopy and lysis of adhesions;  Surgeon: Prince Dowling MD;  Location: [facility] OR     OPTICAL TRACKING SYSTEM CRANIOTOMY, EXCISE TUMOR, COMBINED Left 4/10/2015    Procedure: COMBINED OPTICAL TRACKING SYSTEM CRANIOTOMY, EXCISE TUMOR;  Surgeon: Mirlande Colmenares MD;  Location: [facility] OR     REPAIR GAMEKEEPER'S THUMB Right 12/2/2016    Procedure: REPAIR LIGAMENT ULNAR COLLATERAL THUMB (GAMEKEEPER'S);  Surgeon: Evin Zamorano MD;  [facility]           Family History:     Family History   Problem Relation Age of Onset     Diabetes Brother      Diabetes Father      Alzheimer Disease Father      Unknown/Adopted Mother      Diabetes Paternal Grandfather      Cancer No family hx of      no skin cancer     Skin Cancer No family hx of      no famiy hx of skin cancer     Glaucoma No family hx of      Macular Degeneration No family hx of            Social History:     Social History   Substance Use Topics     Smoking status: Current Every Day Smoker     Packs/day: 0.50     Types: Cigarettes     Last attempt to quit: 10/28/2016     Smokeless tobacco: Former User      Comment: 4 cigarettes     Alcohol use No      Comment: Last etoh in 2007     Social History     Social History Narrative    Born in [facility].  [place]ame to the [place] in [place].  Last traveled to visit family in 2008.                 Review of Systems:   CONSTITUTIONAL: negative fevers or chills   GASTROINTESTINAL: negative for nausea, vomiting, diarrhea or constipation   INTEGUMENT: + for rash or pruritus. Thought to be related to reaction from bug bites  NEURO: Negative for headache, vision  changes         Current Medications:     Current Outpatient Prescriptions   Medication Sig Dispense Refill     blood glucose (NO BRAND SPECIFIED) lancets standard Use to test blood sugar four times daily or as directed. 100 Box 5     blood glucose monitoring (GELACIO CONTOUR NEXT) test strip Use to test blood sugar 3 times daily or as directed.  Ok to substitute alternative if insurance prefers. 100 strip 11     blood glucose monitoring (GELACIO MICROLET) lancets Use to test blood sugar 2 times daily or as directed.  Ok to substitute alternative if insurance prefers. 100 each 11     blood glucose monitoring (NO BRAND SPECIFIED) meter device kit Use to test blood sugar 4 times daily or as directed. 1 kit 0     blood glucose monitoring (NO BRAND SPECIFIED) meter device kit Use to test blood sugar four times daily or as directed. 1 kit 0     blood glucose monitoring (NO BRAND SPECIFIED) test strip 1 strip by In Vitro route 4 times daily (before meals and nightly) Use to test blood sugars 4 times daily or as directed 100 strip 11     blood glucose monitoring (NO BRAND SPECIFIED) test strip Use to test blood sugars four times daily or as directed 100 each 5     GENVOYA [phone] mg tablet Take 1 tablet by mouth daily (with breakfast) 30 tablet 0     insulin glargine (LANTUS SOLOSTAR) 100 UNIT/ML pen Take 14 units each morning 15 mL 3     insulin pen needle (B-D U/F) 31G X 8 MM Use 1 pen needles daily or as directed. 100 each 0     insulin pen needle (BD SCOTT U/F) 32G X 4 MM Use 1 daily as directed. 100 each 11     Omeprazole (PRILOSEC PO) Take 10 mg by mouth daily       BASAGLAR 100 UNIT/ML injection Inject 7 units once daily subcutaneously 15 mL 3            Immunization History:     Immunization History   Administered Date(s) Administered     HepB 03/10/2016, 06/16/2016     HepB-Adult 08/31/2017     Influenza (IIV3) PF 10/17/2016     Influenza Vaccine IM 3yrs+ 4 Valent IIV4 12/22/2015, 12/14/2017     Magdalena  "Tuberculin Skin Test 04/12/2015     Meningococcal (Menactra ) 08/31/2015, 03/10/2016     Pneumo Conj 13-V (2010&after) 03/10/2016     Pneumococcal 23 valent 06/16/2016     TDAP Vaccine (Boostrix) 10/17/2016            Allergies:     Allergies   Allergen Reactions     Metformin      Abdominal pain     Milk [Lac Bovis] Hives     Tylenol [Acetaminophen] Itching     Dulaglutide Rash            Physical Exam:   /83  Pulse 90  Temp 97.2  F (36.2  C) (Oral)  Ht 1.626 m (5' 4\")  Wt 79.4 kg (175 lb)  BMI 30.04 kg/m2  Physical Examination:  GENERAL:  well-developed, well-nourished, seated in no acute distress.  HEENT:  Head is normocephalic, atraumatic   EYES:  Eyes have anicteric sclerae without conjunctival injection   AB: obese.   SKIN:  Scattered healing lesions on extremities.   NEUROLOGIC:  Grossly nonfocal. Active x4 extremities          Laboratory Data:     CD4 Count  Absolute CD4   Date Value Ref Range Status   04/26/2018 299 (L) 441 - 2156 cells/uL Final     CD4 Callaway T   Date Value Ref Range Status   04/26/2018 11 (L) 28 - 63 % Final     CD4:CD8 Ratio   Date Value Ref Range Status   04/26/2018 0.31 (L) 1.40 - 2.60 Final       HIV-1 RNA Quantitative:  HIV-1 RNA Quant Result   Date Value Ref Range Status   04/26/2018 HIV-1 RNA Not Detected HIVND^HIV-1 RNA Not Detected [Copies]/mL Final     Comment:     The DAMON AmpliPrep/DAMON TaqMan HIV-1 test is an FDA-approved in vitro   nucleic acid amplification test for the quantitation of HIV-1 RNA in human   plasma (EDTA plasma) using the DAMON AmpliPrep instrument for automated viral   nucleic acid extraction and the DAMON TaqMan Analyzer or DAMON TaqMan for   automated Real Time PCR amplification and detection of the viral nucleic acid   target.  Titer results are reported in copies/ml. This assay is intended for use in   conjunction with clinical presentation and other laboratory markers of disease   prognosis and for use as an aid in assessing viral response " to antiretroviral   treatment as measured by changes in plasma HIV-1 RNA levels. This test should   not be used as a donor screening test to confirm the presence of HIV-1   infection.         Metabolic Studies       Sodium   Date Value Ref Range Status   07/04/2018 134 133 - 144 mmol/L Final   07/03/2018 139 133 - 144 mmol/L Final     Potassium   Date Value Ref Range Status   07/04/2018 4.3 3.4 - 5.3 mmol/L Final     Comment:     Specimen slightly hemolyzed, potassium may be falsely elevated   07/03/2018 3.4 3.4 - 5.3 mmol/L Final     Chloride   Date Value Ref Range Status   07/04/2018 101 94 - 109 mmol/L Final   07/03/2018 102 94 - 109 mmol/L Final     Carbon Dioxide   Date Value Ref Range Status   07/04/2018 24 20 - 32 mmol/L Final   07/03/2018 25 20 - 32 mmol/L Final     Anion Gap   Date Value Ref Range Status   07/04/2018 9 3 - 14 mmol/L Final   07/03/2018 12 3 - 14 mmol/L Final     Urea Nitrogen   Date Value Ref Range Status   07/04/2018 16 7 - 30 mg/dL Final   07/03/2018 11 7 - 30 mg/dL Final     Creatinine   Date Value Ref Range Status   07/04/2018 0.77 0.66 - 1.25 mg/dL Final   07/03/2018 0.88 0.66 - 1.25 mg/dL Final     GFR Estimate   Date Value Ref Range Status   07/04/2018 >90 >60 mL/min/1.7m2 Final     Comment:     Non  GFR Calc   07/03/2018 >90 >60 mL/min/1.7m2 Final     Comment:     Non  GFR Calc     Glucose   Date Value Ref Range Status   07/04/2018 210 (H) 70 - 99 mg/dL Final   07/03/2018 216 (H) 70 - 99 mg/dL Final     Hemoglobin A1C   Date Value Ref Range Status   08/11/2017 6.0 4.3 - 6.0 % Final     Calcium   Date Value Ref Range Status   07/04/2018 9.5 8.5 - 10.1 mg/dL Final   07/03/2018 10.0 8.5 - 10.1 mg/dL Final     Phosphorus   Date Value Ref Range Status   04/18/2018 2.8 2.5 - 4.5 mg/dL Final   04/17/2018 2.9 2.5 - 4.5 mg/dL Final     Magnesium   Date Value Ref Range Status   04/18/2018 2.0 1.6 - 2.3 mg/dL Final   04/17/2018 1.9 1.6 - 2.3 mg/dL Final      Lactic Acid   Date Value Ref Range Status   04/16/2018 1.0 0.7 - 2.0 mmol/L Final   01/24/2018 1.1 0.4 - 2.0 mmol/L Final       Hepatic Studies    Bilirubin Total   Date Value Ref Range Status   07/04/2018 0.9 0.2 - 1.3 mg/dL Final   07/03/2018 0.8 0.2 - 1.3 mg/dL Final     Alkaline Phosphatase   Date Value Ref Range Status   07/04/2018 107 40 - 150 U/L Final   07/03/2018 124 40 - 150 U/L Final     Albumin   Date Value Ref Range Status   07/04/2018 3.4 3.4 - 5.0 g/dL Final   07/03/2018 4.3 3.4 - 5.0 g/dL Final     AST   Date Value Ref Range Status   07/04/2018 27 0 - 45 U/L Final     Comment:     Specimen is hemolyzed which can falsely elevate AST. Analysis of a   non-hemolyzed specimen may result in a lower value.     07/03/2018 13 0 - 45 U/L Final     ALT   Date Value Ref Range Status   07/04/2018 23 0 - 70 U/L Final   07/03/2018 28 0 - 70 U/L Final       Hematology Studies      WBC   Date Value Ref Range Status   07/04/2018 9.5 4.0 - 11.0 10e9/L Final   07/03/2018 11.5 (H) 4.0 - 11.0 10e9/L Final     Absolute Neutrophil   Date Value Ref Range Status   07/04/2018 6.0 1.6 - 8.3 10e9/L Final   07/03/2018 8.1 1.6 - 8.3 10e9/L Final     Absolute Lymphocytes   Date Value Ref Range Status   07/04/2018 2.5 0.8 - 5.3 10e9/L Final   07/03/2018 2.4 0.8 - 5.3 10e9/L Final     Absolute Monocytes   Date Value Ref Range Status   07/04/2018 0.6 0.0 - 1.3 10e9/L Final   07/03/2018 0.8 0.0 - 1.3 10e9/L Final     Absolute Eosinophils   Date Value Ref Range Status   07/04/2018 0.3 0.0 - 0.7 10e9/L Final   07/03/2018 0.1 0.0 - 0.7 10e9/L Final     Hemoglobin   Date Value Ref Range Status   07/04/2018 14.7 13.3 - 17.7 g/dL Final   07/03/2018 16.0 13.3 - 17.7 g/dL Final     Hematocrit   Date Value Ref Range Status   07/04/2018 42.7 40.0 - 53.0 % Final   07/03/2018 45.6 40.0 - 53.0 % Final     Platelet Count   Date Value Ref Range Status   07/04/2018 229 150 - 450 10e9/L Final   07/03/2018 256 150 - 450 10e9/L Final       Hepatitis  B Testing     Recent Labs   Lab Test  04/13/15   0816   HBCAB  Nonreactive   HEPBANG  Nonreactive   HBCM  Nonreactive   A nonreactive result suggests lack of recent exposure to the virus in the   preceding 6 months.     HBEABY  Negative  Reference range: Negative  (Note)  Performed by [facility],  [address] [place] [address] [phone]  [email or web address], Nayan Corley MD, Lab. Director     HBEAGN  Negative  Reference range: Negative  (Note)  Performed by [facility],  [address] [place] [address] [phone]  [email or web address], Nayan Corley MD, Lab. Director         Hepatitis C Testing     Hepatitis C Antibody   Date Value Ref Range Status   03/02/2017  NR Final    Nonreactive   Assay performance characteristics have not been established for newborns,   infants, and children     04/13/2015  NR Final    Nonreactive   Assay performance characteristics have not been established for newborns,   infants, and children       Imaging:  No results found for this or any previous visit (from the past 744 hour(s)).      Sharon Rosado MD

## 2018-07-20 ENCOUNTER — PRE VISIT (OUTPATIENT)
Dept: GASTROENTEROLOGY | Facility: CLINIC | Age: 53
End: 2018-07-20

## 2018-07-20 LAB — T PALLIDUM AB SER QL: NONREACTIVE

## 2018-07-21 LAB
HIV1 RNA # PLAS NAA DL=20: <20 {COPIES}/ML
HIV1 RNA SERPL NAA+PROBE-LOG#: <1.3 {LOG_COPIES}/ML

## 2018-07-24 ENCOUNTER — TELEPHONE (OUTPATIENT)
Dept: ENDOCRINOLOGY | Facility: CLINIC | Age: 53
End: 2018-07-24

## 2018-07-24 NOTE — TELEPHONE ENCOUNTER
Soham Morales, asking him to call me. He was supposed to come in today for a MTM visit, at 11 am,  get a one week med box and start Biktarvy.     Keesha Zelaya, [facility] Pharmacist.   [phone]

## 2018-07-24 NOTE — TELEPHONE ENCOUNTER
[facility]    Phone Message    May a detailed message be left on voicemail: yes    Reason for Call: Medication Refill Request    Has the patient contacted the pharmacy for the refill? Yes   Name of medication being requested: empagliflozin (JARDIANCE) 10 MG TABS tablet  Provider who prescribed the medication:   Pharmacy: [facility] [address] [place] [address]  Date medication is needed: Today. Patient is coming in at 11AM today.         Action Taken: Message routed to:  [facility] ([facility]): Denilson

## 2018-07-25 ENCOUNTER — TELEPHONE (OUTPATIENT)
Dept: PHARMACY | Facility: CLINIC | Age: 53
End: 2018-07-25

## 2018-07-25 NOTE — TELEPHONE ENCOUNTER
"Andrew called and left me a message stating he was unable to come to clinic yesterday or today, because his car got towed, and he is dealing with getting it back. He report he will call me later or tomorrow to reschedule. He reports he continues to take his \"old\" medication everyday.    Keesha Zelaya, [facility] Pharmacist.   [phone]    "

## 2018-07-27 ENCOUNTER — OFFICE VISIT (OUTPATIENT)
Dept: PHARMACY | Facility: CLINIC | Age: 53
End: 2018-07-27
Payer: COMMERCIAL

## 2018-07-27 VITALS — SYSTOLIC BLOOD PRESSURE: 120 MMHG | HEART RATE: 79 BPM | DIASTOLIC BLOOD PRESSURE: 85 MMHG | TEMPERATURE: 98 F

## 2018-07-27 DIAGNOSIS — K21.9 GASTROESOPHAGEAL REFLUX DISEASE, ESOPHAGITIS PRESENCE NOT SPECIFIED: ICD-10-CM

## 2018-07-27 DIAGNOSIS — E11.9 TYPE 2 DIABETES MELLITUS WITHOUT COMPLICATION, WITHOUT LONG-TERM CURRENT USE OF INSULIN (H): ICD-10-CM

## 2018-07-27 DIAGNOSIS — Z21 HUMAN IMMUNODEFICIENCY VIRUS I INFECTION (H): Primary | ICD-10-CM

## 2018-07-27 PROCEDURE — 99607 MTMS BY PHARM ADDL 15 MIN: CPT | Performed by: PHARMACIST

## 2018-07-27 PROCEDURE — 99605 MTMS BY PHARM NP 15 MIN: CPT | Performed by: PHARMACIST

## 2018-07-27 NOTE — PROGRESS NOTES
SUBJECTIVE/OBJECTIVE:                Andrew Lockwood is a 50 year old male coming in for a follow-up visit for Medication Therapy Management.  He was referred to me from Dr. Rosado. It has been > 2 years since last MTM appointment.     Chief Complaint:   No complaints, and is happy he is switching to an HIV medication he does not have to take with food.   Personal Healthcare Goals: Stay undetectable.  Tobacco: 0-1 pack per day - is interested in quittingTobacco Cessation Action Plan: pt would like to try Chantix, says nicotine patches did not work, and gave him a headache. Reports he would like to discuss this next time.  Alcohol: none    Medication Adherence/Access:  Patient uses pill box(es). Does not always take every dose of Genvoya because he dose not have access to food.     HIV: On 07/19/18 CD4 was 299 (13%), and viral load was <20 copies/ml. Patient reports takes, Genvoya, but will be switched to Biktarvy, (bictegravir-emtricitabine-tenofovir -25 mg), once daily, as this medication does not have to be taken with food. Reports he is glad about this because he reports if he took Genvoya without food he would get a very bad stomach ache.     GERD: Current medications include: Prilosec (omeprazole) 20 ng once daily. Pt c/o no current symptoms.  Patient feels that current regimen is effective.    Diabetes:  Pt currently taking Jardiance 10 mg, once daily and uses Lantus 25 units daily in the morning. Reports he is not using Basaglar because it doesn't work for him. Pt is not experiencing side effects.  SMBG: does not check blood sugars because he is homeless and can not carry supplies around.     Patient is not experiencing hypoglycemia  Recent symptoms of high blood sugar? vision changes, polydipsia and polyuria. Believes his blood sugars are good because he is feeling well. Reports he saw endocrin last week, and they want him to test his blood sugars, but he feels this is impossible at this time.   Eye  "exam: up to date  Foot exam: due  ACEi/ARB: No.   Urine Albumin:   Lab Results   Component Value Date    UMALCR 10.28 07/19/2018   Aspirin: Not taking due to unknown reason.   Diet/Exercise: Reports is homeless and gets food at the shelter which includes, pasta, chili. Hotdogs, \"cheap unhealthy food\". Reports does a lot of walking.        ASSESSMENT:              Current medications were reviewed today as discussed above.      Medication Adherence: poor, needs improvement - see below    HIV: CD4 appears to be low, however stable, and viral load is undetectable. Pt will be switching from Genvoya to Biktarvy, due to Biktarvy does not have to be take with food. We reviewed dosing and the importance of strict medication adherence. Pt agreed to return to clinic once per week and  a med box and review adherence.     GERD: Stable.  Current treatment is effective.    Diabetes: Needs Improvement. Patient is not meeting A1c goal of < 7%. Self monitoring of blood glucose is not being done due to homelessness. Due for annual foot exam. Aspirin therapy is indicated in this patient at dose of 81mg daily. Pt is not taking aspirin. High density statin is indicated in this pt. We had a talk about testing blood sugar and diet, however at this time because of patients homelessness both are difficult. Pt is doing the best he can.      PLAN:                  1) Start Biktarvy, once daily.    2) PCP/Endo  my consider adding Aspirin 81 mg daily.    3) PCP/Endo may consider starting a high density statin.     4) Diabetic foot exam due.    5) PHQ-9 at follow up.     I spent 30 minutes with this patient today. I offer these suggestions for consideration by the ID provider. A copy of the visit note was provided to the patient's ID/primary care provider.     Will follow up in one week.    The patient declined a summary of these recommendations as an after visit summary.    Keesha Zelaya, [facility] Pharmacist.   [phone]      "

## 2018-07-27 NOTE — MR AVS SNAPSHOT
After Visit Summary   7/27/2018    Andrew Lockwood    MRN: [id number]           Patient Information     Date Of Birth          11/27/1967        Visit Information        Provider Department      7/27/2018 9:30 AM Keesha Zelaya RPH [facility] and Infectious Diseases MT        Today's Diagnoses     Human immunodeficiency virus I infection (H)    -  1    Type 2 diabetes mellitus without complication, without long-term current use of insulin (H)        Gastroesophageal reflux disease, esophagitis presence not specified          Care Instructions    Recommendations from today's MTM visit:                                                        1. Start Biktarvy.      Next MTM visit: Thursday at 10:30.    To schedule another MTM appointment, please call the clinic directly or you may call the MTM scheduling line at [phone] or toll-free at [phone].     My Clinical Pharmacist's contact information:                                                      It was a pleasure seeing you today!  Please feel free to contact me with any questions or concerns you have.      Keesha Zelaya [facility] Pharmacist.   [phone]      You may receive a survey about the MT services you received.  I would appreciate your feedback to help me serve you better in the future. Please fill it out and return it when you can. Your comments will be anonymous.      My healthcare goals:                                                      Stay undetectable.                 Follow-ups after your visit        Your next 10 appointments already scheduled     Aug 02, 2018 10:30 AM CDT   (Arrive by 10:15 AM)   SHORT with TAMELA [facility] and Infectious Diseases MT ([facility])    [address] [place] [address]  [address]  [facility] [address] [phone]            Aug 07, 2018 11:00 AM CDT   (Arrive by 10:45 AM)   RETURN DIABETES with Lenora Haley PA-C   [facility]  Endocrinology ([facility])    [address] [facility] [address]  3rd Floor  [facility] [id number] [phone]            Aug 21, 2018  3:00 PM CDT   (Arrive by 2:45 PM)   CONSULT with Mick Yuong MD   [facility] Dermatologic Surgery ([facility])    [address] [facility]  3rd [facility] [id number] [phone]            Oct 08, 2018  1:00 PM CDT   (Arrive by 12:45 PM)   Return Visit with Rekha Membreno MD   [facility] ([facility])    [address] [facility]  4th Floor  [facility] [id number] [phone]              Who to contact     If you have questions or need follow up information about today's clinic visit or your schedule please contact [facility] AND INFECTIOUS DISEASES [facility] directly at [phone].  Normal or non-critical lab and imaging results will be communicated to you by MyChart, letter or phone within 4 business days after the clinic has received the results. If you do not hear from us within 7 days, please contact the clinic through [employer] or phone. If you have a critical or abnormal lab result, we will notify you by phone as soon as possible.  Submit refill requests through [employer] or call your pharmacy and they will forward the refill request to us. Please allow 3 business days for your refill to be completed.          Additional Information About Your Visit        [employer] Information     [employer] gives you secure access to your electronic health record. If you see a primary care provider, you can also send messages to your care team and make appointments. If you have questions, please call your primary care clinic.  If you do not have a primary care provider, please call [phone] and they will assist you.        Care EveryWhere ID     This is your Care EveryWhere ID. This could be used by other organizations to access your [place] medical records  [id number]        Your  Vitals Were     Pulse Temperature                79 98  F (36.7  C)           Blood Pressure from Last 3 Encounters:   07/27/18 120/85   07/19/18 133/83   07/06/18 116/73    Weight from Last 3 Encounters:   07/19/18 175 lb (79.4 kg)   07/06/18 174 lb 1.6 oz (79 kg)   07/04/18 176 lb (79.8 kg)              Today, you had the following     No orders found for display         Today's Medication Changes          These changes are accurate as of 7/27/18 11:59 PM.  If you have any questions, ask your nurse or doctor.               These medicines have changed or have updated prescriptions.        Dose/Directions    LANTUS SOLOSTAR 100 UNIT/ML injection   This may have changed:  Another medication with the same name was removed. Continue taking this medication, and follow the directions you see here.   Used for:  Type 2 diabetes mellitus with hyperglycemia, without long-term current use of insulin (H)   Generic drug:  insulin glargine        Take 14 units each morning   Quantity:  15 mL   Refills:  3         Stop taking these medicines if you haven't already. Please contact your care team if you have questions.     Btomkfg-Ddydx-Gwzghsiq-TenofAF [phone] MG Tabs per tablet   Commonly known as:  GENVOYA                    Primary Care Provider Office Phone # Fax #    Rubio Cronin [phone]       [address] [address]        Equal Access to Services     RA JORDAN AH: Hadii aad ku hadasho Soterence, waaxda luqadaha, qaybta kaalmada adeegyada, waxay merline salazar. So [facility] [phone].    ATENCIÓN: Si habla español, tiene a kohli disposición servicios gratuitos de asistencia lingüística. Gurdeep al [phone].    We comply with applicable federal civil rights laws and [place] laws. We do not discriminate on the basis of race, color, national origin, age, disability, sex, sexual orientation, or gender identity.            Thank you!      Thank you for choosing [facility] AND INFECTIOUS DISEASES [facility]  for your care. Our goal is always to provide you with excellent care. Hearing back from our patients is one way we can continue to improve our services. Please take a few minutes to complete the written survey that you may receive in the mail after your visit with us. Thank you!             Your Updated Medication List - Protect others around you: Learn how to safely use, store and throw away your medicines at www.disposemymeds.org.          This list is accurate as of 7/27/18 11:59 PM.  Always use your most recent med list.                   Brand Name Dispense Instructions for use Diagnosis    Bictegravir-Emtricitab-Tenofov -25 MG Tabs     30 tablet    Take 1 tablet by mouth daily    HIV (human immunodeficiency virus infection) (H)       blood glucose lancets standard    no brand specified    100 Box    Use to test blood sugar four times daily or as directed.    Type 2 diabetes mellitus without complication, without long-term current use of insulin (H)       blood glucose monitoring lancets     100 each    Use to test blood sugar 2 times daily or as directed.  Ok to substitute alternative if insurance prefers.    Type 2 diabetes mellitus with hyperglycemia, without long-term current use of insulin (H)       * blood glucose monitoring meter device kit    no brand specified    1 kit    Use to test blood sugar four times daily or as directed.    Type 2 diabetes mellitus without complication, without long-term current use of insulin (H)       * blood glucose monitoring meter device kit    no brand specified    1 kit    Use to test blood sugar 4 times daily or as directed.    Type 2 diabetes mellitus with complication, without long-term current use of insulin (H)       * blood glucose monitoring test strip    no brand specified    100 each    Use to test blood sugars four times daily or as directed    Type 2 diabetes mellitus without  complication, without long-term current use of insulin (H)       * blood glucose monitoring test strip    no brand specified    100 strip    1 strip by In Vitro route 4 times daily (before meals and nightly) Use to test blood sugars 4 times daily or as directed    Type 2 diabetes mellitus with complication, without long-term current use of insulin (H)       * blood glucose monitoring test strip    GELACIO CONTOUR NEXT    100 strip    Use to test blood sugar 3 times daily or as directed.  Ok to substitute alternative if insurance prefers.    Type 2 diabetes mellitus with hyperglycemia, without long-term current use of insulin (H)       empagliflozin 10 MG Tabs tablet    JARDIANCE     Take 1 tablet (10 mg) by mouth daily        * insulin pen needle 31G X 8 MM    B-D U/F    100 each    Use 1 pen needles daily or as directed.    Type 2 diabetes mellitus without complication, without long-term current use of insulin (H)       * insulin pen needle 32G X 4 MM    BD SCOTT U/F    100 each    Use 1 daily as directed.    Type 2 diabetes mellitus with hyperglycemia, without long-term current use of insulin (H)       LANTUS SOLOSTAR 100 UNIT/ML injection   Generic drug:  insulin glargine     15 mL    Take 14 units each morning    Type 2 diabetes mellitus with hyperglycemia, without long-term current use of insulin (H)       omeprazole 20 MG CR capsule    priLOSEC    30 capsule    Take 1 capsule (20 mg) by mouth daily        * Notice:  This list has 7 medication(s) that are the same as other medications prescribed for you. Read the directions carefully, and ask your doctor or other care provider to review them with you.

## 2018-07-30 NOTE — PATIENT INSTRUCTIONS
Recommendations from today's MTM visit:                                                        1. Start Biktarvy.      Next MTM visit: Thursday at 10:30.    To schedule another MTM appointment, please call the clinic directly or you may call the MTM scheduling line at [phone] or toll-free at [phone].     My Clinical Pharmacist's contact information:                                                      It was a pleasure seeing you today!  Please feel free to contact me with any questions or concerns you have.      Keesha Zelaya, MTM Pharmacist.   [phone]      You may receive a survey about the MTM services you received.  I would appreciate your feedback to help me serve you better in the future. Please fill it out and return it when you can. Your comments will be anonymous.      My healthcare goals:                                                      Stay undetectable.

## 2018-08-02 ENCOUNTER — OFFICE VISIT (OUTPATIENT)
Dept: PHARMACY | Facility: CLINIC | Age: 53
End: 2018-08-02
Payer: COMMERCIAL

## 2018-08-02 DIAGNOSIS — E11.9 DIABETES MELLITUS, TYPE 2 (H): Primary | ICD-10-CM

## 2018-08-02 DIAGNOSIS — E11.9 TYPE 2 DIABETES MELLITUS WITHOUT COMPLICATION, WITHOUT LONG-TERM CURRENT USE OF INSULIN (H): ICD-10-CM

## 2018-08-02 DIAGNOSIS — Z21 HUMAN IMMUNODEFICIENCY VIRUS I INFECTION (H): Primary | ICD-10-CM

## 2018-08-02 PROCEDURE — 99606 MTMS BY PHARM EST 15 MIN: CPT | Performed by: PHARMACIST

## 2018-08-03 ASSESSMENT — PATIENT HEALTH QUESTIONNAIRE - PHQ9: SUM OF ALL RESPONSES TO PHQ QUESTIONS 1-9: 0

## 2018-08-03 NOTE — PROGRESS NOTES
SUBJECTIVE/OBJECTIVE:                Andrew Lockwood is a 50 year old male coming in for a follow-up visit for Medication Therapy Management.  He was referred to me from Dr. Rosado.     Chief Complaint: Follow up from our visit on 07/27/18.  No complaints.  Personal Healthcare Goals: Stay undetectable  Tobacco: 0-1 pack per day - is interested in quitting Reports does not want to try Chantix because of possible side effect, but may be interested in trying nicotine inhaler, and we will discuss next week.  Alcohol: none    Medication Adherence/Access:  no issues reported    HIV: On 07/19/18 CD4 was 299 (13%), and viral load was <20 copies/ml. Patient reports started, Biktarvy, last week and is taking it once daily. Reports no missed doses and is very happy he can take this medication without food. Reports no stomach upset.     Diabetes:  Reports he continues to us Lantus 25 units daily and takes Jardiance 10 mg, once daily. Reports no known side effects.  Reports he is following up closely with the diabetes clinic, and is working on his blood sugar control with them.   SMBG: Not testing blood sugars due to homelessness.  Patient reports one day when he thought his blood sugar was low because he felt dizzy, he ate something and felt better.  Recent symptoms of high blood sugar? polydipsia and polyuria  Eye exam: due  Foot exam: due  ACEi/ARB: No.   Urine Albumin:   Lab Results   Component Value Date    UMALCR 10.28 07/19/2018      Aspirin: Not taking due to unknown reason.  Diet/Exercise: eats what he can at the shelter, and walks a lot.       ASSESSMENT:              Current medications were reviewed today as discussed above.      Medication Adherence: adherence appears improved. Andrew has agreed to come to clinic once per week to review adherence and receive a new, one week med box.      HIV: CD4 appears to be low, however, stable, and viral load is undetectable. Pt was switched to Bikatarvy about one week ago, and per  his account he is tolerating this medication well, when he takes it on a empty stomach. Biktarvy is an appropriate 3 drug regimen for HIV infection. Pt received another med box today, and he will return to clinic next week.     Diabetes: Needs Improvement. Patient is not meeting A1c goal of < 7%. Self monitoring of blood glucose is not being done due to homelessness. Due for annual foot exam. Aspirin therapy is indicated in this patient at dose of 81mg daily. Pt is not taking aspirin. High density statin is indicated in this pt. We had a talk about testing blood sugar and diet, however at this time because of patients homelessness both are difficult for him. Pt is doing the best he can.      PLAN:                  1) No considerations for medication changes today.     2) PHQ-9 today.    Considerations from last MTM appt from 07/27/18:    1) PCP/Endo may consider starting Aspirin 81 mg daily.     2) PCP/Endo may consider starting a high density statin.      3) Diabetic foot exam due.    I spent 15 minutes with this patient today. I offer these suggestions with the understanding that I don't fully understand Andrew's past medical history and the complexity of his health conditions. Andrew should make no changes without the approval of his physician. A copy of the visit note was provided to the patient's primary care and ID provider.     Will follow up in one week.    The patient declined a summary of these recommendations as an after visit summary.    Keesha Zelaya, [facility] Pharmacist.   [phone]

## 2018-08-06 ENCOUNTER — TELEPHONE (OUTPATIENT)
Dept: PHARMACY | Facility: CLINIC | Age: 53
End: 2018-08-06

## 2018-08-06 NOTE — TELEPHONE ENCOUNTER
FUTURE VISIT INFORMATION      FUTURE VISIT INFORMATION:    Date: 8/08    Time: 3:00    Location:   REFERRAL INFORMATION:    Referring provider:  SELF    Referring providers clinic:      Reason for visit/diagnosis: Bilateral Shoulder Pain and Arm pain        RECORDS STATUS       No Outside records or Imaging

## 2018-08-06 NOTE — TELEPHONE ENCOUNTER
Attempted to contact the patient to for refill reminder call,  left message via text and email. Tried to call the number on file and it said it was out of service. I have a complete medbox ready for him to .     Follow-up Date: 08/07/18    Lenka Mabry, [facility]  [phone]

## 2018-08-07 VITALS — SYSTOLIC BLOOD PRESSURE: 131 MMHG | HEART RATE: 93 BPM | DIASTOLIC BLOOD PRESSURE: 86 MMHG | TEMPERATURE: 98 F

## 2018-08-07 NOTE — PATIENT INSTRUCTIONS
Recommendations from today's MTM visit:                                                      1) No considerations for medication changes today.     2) PHQ-9 today.    Considerations from last MTM appt from 07/27/18:    1) PCP/Endo may consider starting Aspirin 81 mg daily.     2) PCP/Endo may consider starting a high density statin.      3) Diabetic foot exam due.      Next MTM visit: one week    To schedule another MTM appointment, please call the clinic directly or you may call the MTM scheduling line at [phone] or toll-free at [phone].     My Clinical Pharmacist's contact information:                                                      It was a pleasure seeing you today!  Please feel free to contact me with any questions or concerns you have.      Keesha Zelaya, MTM Pharmacist.   [phone]      You may receive a survey about the MTM services you received.  I would appreciate your feedback to help me serve you better in the future. Please fill it out and return it when you can. Your comments will be anonymous.      My healthcare goals:                                                      Stay undetectable and take medications every day.

## 2018-08-08 ENCOUNTER — PRE VISIT (OUTPATIENT)
Dept: ORTHOPEDICS | Facility: CLINIC | Age: 53
End: 2018-08-08

## 2018-08-10 ENCOUNTER — TELEPHONE (OUTPATIENT)
Dept: PHARMACY | Facility: CLINIC | Age: 53
End: 2018-08-10

## 2018-08-10 NOTE — TELEPHONE ENCOUNTER
Pt was supposed to come in for an MTM adherence check on 08/09. Pt was a no show, however per pharmacy he did go to the pharmacy to  a one week med box.    I called pt and asked that he call me.     Keesha Zelaya, MTM Pharmacist.   [phone]

## 2018-08-11 LAB
PHENOSENSE GT EER HIV-1 INTEGRASE: NORMAL
SERVICE CMNT-IMP: NORMAL
SPECIMEN DRAWN SERPL: NORMAL

## 2018-08-14 ENCOUNTER — MYC MEDICAL ADVICE (OUTPATIENT)
Dept: INFECTIOUS DISEASES | Facility: CLINIC | Age: 53
End: 2018-08-14

## 2018-08-14 ENCOUNTER — TELEPHONE (OUTPATIENT)
Dept: INFECTIOUS DISEASES | Facility: CLINIC | Age: 53
End: 2018-08-14

## 2018-08-15 ENCOUNTER — APPOINTMENT (OUTPATIENT)
Dept: LAB | Facility: CLINIC | Age: 53
End: 2018-08-15
Payer: COMMERCIAL

## 2018-08-15 ENCOUNTER — OFFICE VISIT (OUTPATIENT)
Dept: INFECTIOUS DISEASES | Facility: CLINIC | Age: 53
End: 2018-08-15
Attending: INTERNAL MEDICINE
Payer: COMMERCIAL

## 2018-08-15 VITALS
WEIGHT: 174 LBS | RESPIRATION RATE: 20 BRPM | OXYGEN SATURATION: 99 % | HEIGHT: 64 IN | DIASTOLIC BLOOD PRESSURE: 91 MMHG | BODY MASS INDEX: 29.71 KG/M2 | SYSTOLIC BLOOD PRESSURE: 131 MMHG | HEART RATE: 80 BPM

## 2018-08-15 DIAGNOSIS — Z79.4 TYPE 2 DIABETES MELLITUS WITHOUT COMPLICATION, WITH LONG-TERM CURRENT USE OF INSULIN (H): ICD-10-CM

## 2018-08-15 DIAGNOSIS — Z21 HUMAN IMMUNODEFICIENCY VIRUS I INFECTION (H): Primary | ICD-10-CM

## 2018-08-15 DIAGNOSIS — Z00.00 HEALTHCARE MAINTENANCE: ICD-10-CM

## 2018-08-15 DIAGNOSIS — E11.9 TYPE 2 DIABETES MELLITUS WITHOUT COMPLICATION, WITH LONG-TERM CURRENT USE OF INSULIN (H): ICD-10-CM

## 2018-08-15 DIAGNOSIS — Z72.0 TOBACCO USE: ICD-10-CM

## 2018-08-15 LAB
ALBUMIN SERPL-MCNC: 3.8 G/DL (ref 3.4–5)
ALP SERPL-CCNC: 122 U/L (ref 40–150)
ALT SERPL W P-5'-P-CCNC: 28 U/L (ref 0–70)
ANION GAP SERPL CALCULATED.3IONS-SCNC: 7 MMOL/L (ref 3–14)
AST SERPL W P-5'-P-CCNC: 21 U/L (ref 0–45)
BASOPHILS # BLD AUTO: 0 10E9/L (ref 0–0.2)
BASOPHILS NFR BLD AUTO: 0.5 %
BILIRUB SERPL-MCNC: 0.7 MG/DL (ref 0.2–1.3)
BUN SERPL-MCNC: 11 MG/DL (ref 7–30)
CALCIUM SERPL-MCNC: 9.8 MG/DL (ref 8.5–10.1)
CD3 CELLS # BLD: 1186 CELLS/UL (ref 603–2990)
CD3 CELLS NFR BLD: 50 % (ref 49–84)
CD3+CD4+ CELLS # BLD: 317 CELLS/UL (ref 441–2156)
CD3+CD4+ CELLS NFR BLD: 13 % (ref 28–63)
CD3+CD4+ CELLS/CD3+CD8+ CLL BLD: 0.38 % (ref 1.4–2.6)
CD3+CD8+ CELLS # BLD: 822 CELLS/UL (ref 125–1312)
CD3+CD8+ CELLS NFR BLD: 34 % (ref 10–40)
CHLORIDE SERPL-SCNC: 104 MMOL/L (ref 94–109)
CHOLEST SERPL-MCNC: 180 MG/DL
CO2 SERPL-SCNC: 28 MMOL/L (ref 20–32)
CREAT SERPL-MCNC: 0.76 MG/DL (ref 0.66–1.25)
DIFFERENTIAL METHOD BLD: NORMAL
EOSINOPHIL # BLD AUTO: 0.4 10E9/L (ref 0–0.7)
EOSINOPHIL NFR BLD AUTO: 4.4 %
ERYTHROCYTE [DISTWIDTH] IN BLOOD BY AUTOMATED COUNT: 13.2 % (ref 10–15)
GFR SERPL CREATININE-BSD FRML MDRD: >90 ML/MIN/1.7M2
GLUCOSE SERPL-MCNC: 143 MG/DL (ref 70–99)
HCT VFR BLD AUTO: 42.2 % (ref 40–53)
HDLC SERPL-MCNC: 38 MG/DL
HGB BLD-MCNC: 14.2 G/DL (ref 13.3–17.7)
IFC SPECIMEN: ABNORMAL
IMM GRANULOCYTES # BLD: 0.1 10E9/L (ref 0–0.4)
IMM GRANULOCYTES NFR BLD: 0.8 %
LDLC SERPL CALC-MCNC: 105 MG/DL
LYMPHOCYTES # BLD AUTO: 2.5 10E9/L (ref 0.8–5.3)
LYMPHOCYTES NFR BLD AUTO: 29.7 %
MCH RBC QN AUTO: 30.4 PG (ref 26.5–33)
MCHC RBC AUTO-ENTMCNC: 33.6 G/DL (ref 31.5–36.5)
MCV RBC AUTO: 90 FL (ref 78–100)
MONOCYTES # BLD AUTO: 0.6 10E9/L (ref 0–1.3)
MONOCYTES NFR BLD AUTO: 7.7 %
NEUTROPHILS # BLD AUTO: 4.7 10E9/L (ref 1.6–8.3)
NEUTROPHILS NFR BLD AUTO: 56.9 %
NONHDLC SERPL-MCNC: 143 MG/DL
NRBC # BLD AUTO: 0 10*3/UL
NRBC BLD AUTO-RTO: 0 /100
PLATELET # BLD AUTO: 265 10E9/L (ref 150–450)
POTASSIUM SERPL-SCNC: 3.5 MMOL/L (ref 3.4–5.3)
PROT SERPL-MCNC: 7.5 G/DL (ref 6.8–8.8)
RBC # BLD AUTO: 4.67 10E12/L (ref 4.4–5.9)
SODIUM SERPL-SCNC: 139 MMOL/L (ref 133–144)
TRIGL SERPL-MCNC: 190 MG/DL
WBC # BLD AUTO: 8.3 10E9/L (ref 4–11)

## 2018-08-15 PROCEDURE — 80061 LIPID PANEL: CPT | Performed by: INTERNAL MEDICINE

## 2018-08-15 PROCEDURE — 86359 T CELLS TOTAL COUNT: CPT | Performed by: INTERNAL MEDICINE

## 2018-08-15 PROCEDURE — G0463 HOSPITAL OUTPT CLINIC VISIT: HCPCS | Mod: ZF

## 2018-08-15 PROCEDURE — 87536 HIV-1 QUANT&REVRSE TRNSCRPJ: CPT | Performed by: INTERNAL MEDICINE

## 2018-08-15 PROCEDURE — 85025 COMPLETE CBC W/AUTO DIFF WBC: CPT | Performed by: INTERNAL MEDICINE

## 2018-08-15 PROCEDURE — 80053 COMPREHEN METABOLIC PANEL: CPT | Performed by: INTERNAL MEDICINE

## 2018-08-15 PROCEDURE — 86360 T CELL ABSOLUTE COUNT/RATIO: CPT | Performed by: INTERNAL MEDICINE

## 2018-08-15 PROCEDURE — 36415 COLL VENOUS BLD VENIPUNCTURE: CPT | Performed by: INTERNAL MEDICINE

## 2018-08-15 ASSESSMENT — PAIN SCALES - GENERAL: PAINLEVEL: NO PAIN (0)

## 2018-08-15 NOTE — MR AVS SNAPSHOT
After Visit Summary   8/15/2018    Andrew Lockwood    MRN: [id number]           Patient Information     Date Of Birth          11/27/1967        Visit Information        Provider Department      8/15/2018 1:00 PM Florence Carbajal MD [facility] and Infectious Diseases        Today's Diagnoses     Human immunodeficiency virus I infection (H)    -  1    Type 2 diabetes mellitus without complication, with long-term current use of insulin (H)        Tobacco use        Healthcare maintenance           Follow-ups after your visit        Follow-up notes from your care team     Return in about 4 weeks (around 9/12/2018).      Your next 10 appointments already scheduled     Sep 06, 2018  9:30 AM CDT   (Arrive by 9:15 AM)   RETURN DIABETES with Lenora Haley PA-C   [facility] Endocrinology ([facility])    [address] [place] [address] [phone]            Oct 08, 2018  1:00 PM CDT   (Arrive by 12:45 PM)   Return Visit with Rekha Membreno MD   [facility] ([facility])    [address] [place] [address] [phone]              Who to contact     If you have questions or need follow up information about today's clinic visit or your schedule please contact [facility] AND INFECTIOUS DISEASES directly at [phone].  Normal or non-critical lab and imaging results will be communicated to you by MyChart, letter or phone within 4 business days after the clinic has received the results. If you do not hear from us within 7 days, please contact the clinic through MyChart or phone. If you have a critical or abnormal lab result, we will notify you by phone as soon as possible.  Submit refill requests through [employer] or call your pharmacy and they will forward the refill request to us. Please allow 3 business days for your refill to be completed.           "Additional Information About Your Visit        MyChart Information     [employer] gives you secure access to your electronic health record. If you see a primary care provider, you can also send messages to your care team and make appointments. If you have questions, please call your primary care clinic.  If you do not have a primary care provider, please call [phone] and they will assist you.        Care EveryWhere ID     This is your Care EveryWhere ID. This could be used by other organizations to access your [place] medical records  [id number]        Your Vitals Were     Pulse Respirations Height Pulse Oximetry BMI (Body Mass Index)       80 20 1.626 m (5' 4\") 99% 29.87 kg/m2        Blood Pressure from Last 3 Encounters:   08/30/18 (!) 119/92   08/24/18 123/81   08/15/18 (!) 131/91    Weight from Last 3 Encounters:   08/30/18 79.8 kg (176 lb)   08/24/18 77.1 kg (170 lb)   08/15/18 78.9 kg (174 lb)              We Performed the Following     CBC with platelets differential     Comprehensive metabolic panel     HIV-1 RNA quantitative     Lipid panel reflex to direct LDL Fasting     T cell subset profile        Primary Care Provider Office Phone # Fax #    Rubio Cronin [phone]       [address]  [facility] [address]        Equal Access to Services     RA JORDAN : Hadii carissa ku hadasho Soomaali, waaxda luqadaha, qaybta kaalmada adeegyada, dez salazar. So [facility] [phone].    ATENCIÓN: Si habla español, tiene a kohli disposición servicios gratuitos de asistencia lingüística. Llame al [phone].    We comply with applicable federal civil rights laws and [place] laws. We do not discriminate on the basis of race, color, national origin, age, disability, sex, sexual orientation, or gender identity.            Thank you!     Thank you for choosing [facility] AND INFECTIOUS DISEASES  for your care. Our goal is always to " provide you with excellent care. Hearing back from our patients is one way we can continue to improve our services. Please take a few minutes to complete the written survey that you may receive in the mail after your visit with us. Thank you!             Your Updated Medication List - Protect others around you: Learn how to safely use, store and throw away your medicines at www.disposemymeds.org.          This list is accurate as of 8/15/18 11:59 PM.  Always use your most recent med list.                   Brand Name Dispense Instructions for use Diagnosis    Bictegravir-Emtricitab-Tenofov -25 MG Tabs     30 tablet    Take 1 tablet by mouth daily    HIV (human immunodeficiency virus infection) (H)       blood glucose lancets standard    no brand specified    100 Box    Use to test blood sugar four times daily or as directed.    Type 2 diabetes mellitus without complication, without long-term current use of insulin (H)       blood glucose monitoring lancets     100 each    Use to test blood sugar 2 times daily or as directed.  Ok to substitute alternative if insurance prefers.    Type 2 diabetes mellitus with hyperglycemia, without long-term current use of insulin (H)       * blood glucose monitoring meter device kit    no brand specified    1 kit    Use to test blood sugar four times daily or as directed.    Type 2 diabetes mellitus without complication, without long-term current use of insulin (H)       * blood glucose monitoring meter device kit    no brand specified    1 kit    Use to test blood sugar 4 times daily or as directed.    Type 2 diabetes mellitus with complication, without long-term current use of insulin (H)       * blood glucose monitoring test strip    no brand specified    100 each    Use to test blood sugars four times daily or as directed    Type 2 diabetes mellitus without complication, without long-term current use of insulin (H)       * blood glucose monitoring test strip    no brand  specified    100 strip    1 strip by In Vitro route 4 times daily (before meals and nightly) Use to test blood sugars 4 times daily or as directed    Type 2 diabetes mellitus with complication, without long-term current use of insulin (H)       * blood glucose monitoring test strip    GELACIO CONTOUR NEXT    100 strip    Use to test blood sugar 3 times daily or as directed.  Ok to substitute alternative if insurance prefers.    Type 2 diabetes mellitus with hyperglycemia, without long-term current use of insulin (H)       empagliflozin 10 MG Tabs tablet    JARDIANCE    90 tablet    Take 1 tablet by mouth daily    Diabetes mellitus, type 2 (H)       * insulin pen needle 31G X 8 MM    B-D U/F    100 each    Use 1 pen needles daily or as directed.    Type 2 diabetes mellitus without complication, without long-term current use of insulin (H)       * insulin pen needle 32G X 4 MM    BD SCOTT U/F    100 each    Use 1 daily as directed.    Type 2 diabetes mellitus with hyperglycemia, without long-term current use of insulin (H)       LANTUS SOLOSTAR 100 UNIT/ML injection   Generic drug:  insulin glargine     15 mL    Take 14 units each morning    Type 2 diabetes mellitus with hyperglycemia, without long-term current use of insulin (H)       omeprazole 20 MG CR capsule    priLOSEC    30 capsule    Take 1 capsule (20 mg) by mouth daily        * Notice:  This list has 7 medication(s) that are the same as other medications prescribed for you. Read the directions carefully, and ask your doctor or other care provider to review them with you.

## 2018-08-15 NOTE — NURSING NOTE
"Chief Complaint   Patient presents with     RECHECK     B20 follow Up.      BP (!) 131/91 (Patient Position: Sitting)  Pulse 80  Resp 20  Ht 1.626 m (5' 4\")  Wt 78.9 kg (174 lb)  SpO2 99%  BMI 29.87 kg/m2  Yolanda Brown LPN     "

## 2018-08-15 NOTE — PROGRESS NOTES
[facility]  Infectious Disease Clinic Note     Patient:  Andrew Lockwood, Date of birth 11/27/1967, Medical record number [id number]  Date of Visit:  08/15/2018           Assessment and Recommendations:   Recommendations:  - continue Biktarvy  - repeat labs  - outpatient follow up with colorectal surgery  - outpatient ID follow up in 1 month    Assessment:    Infectious Disease issues include:  - HIV/AIDS: Mr. Lockwood was diagnosed with HIV/AIDS in 2015 when he presented with toxoplasmosis. He has had difficulty with adherence and previously been treated with Triumeq and Genvoya. He was recently switched to Biktarvy on 7/19/2018 and reports excellent adherence since that time. He remains homeless and is not currently sexually active. We will continue with his current regimen and follow closely in clinic to support continued adherence.    Preventative health care  Cardiovascular Juan Jose -               Lipids - CHOL 180,  (8/18)               Blood Pressure -  /91   Endocrine: DMII. Followed by endocrine.  Cancer Screening              Colon - Up to date. [facility]              Anal - Anal pap with colorectal 6/22 with [facility]. Patient needs to follow up with colorectal for this.  Immunizations              Hepatitis A - immune, serology 4/13/15              Hepatitis B - 6/16/2016, 3/10/2016, 8/31/2017              Influenza - Up To date              Pneumovax/Prevnar - Up to date               Tdap - 6/8/14 per [facility]              Menactra - Up to date.    Renal Health -  UA without proteinuria and creatinine normal in 7/3/18   Sexually Transmitted Infection Risk and Screening              Gonorrhea and Chlamydia - GC/chlamydia negative on 2/22/18, declined testing today, he has a boyfriend but says they are not sexually active.               Syphilis - negative 7/2018   Tobacco abuse: patient working on quitting and down to several cigarettes per day.     Patient seen and discussed with  ID attending, Dr. Rashi Austin.  Florence Carbajal MD, [id number]  Fellow in Pediatric and Adult Infectious Disease  pager:  [phone]           History of the Infectious Disease lllness:   Mr. Lockwood is a 50 year old male with PMH of HIV previously followed by Dr. Rosado seen today in clinic for follow up. He was most recently seen by Dr. Rosado on 7/19/2018.    At his most recent visit, he was switched to Biktarvy in order to improve adherence by avoiding the need to take medications with food. Andrew reports that this has been going very well and he's been able to take his medication without missing any doses. He denies any side effects from the medication. He remains homeless and is sleeping mostly on the streets after kylee bed bugs at one of the shelters. He declines any additional assistance with housing at this time, and is not interested in any creams or treatment for his rash. He reports that he is working at cutting down on cigarettes and is currently down to smoking about 4 cigarettes per day. He is not currently sexually active. No other new issues or concerns.     Review of Systems:  CONSTITUTIONAL:  No fevers or chills. No night sweats.  EYES: negative for icterus  ENT:  negative for hearing loss, tinnitus or sore throat  RESPIRATORY:  negative for cough, sputum, dyspnea  CARDIOVASCULAR:  negative for chest pain, palpitations  GASTROINTESTINAL:  negative for nausea, vomiting, diarrhea or constipation  GENITOURINARY:  negative for dysuria  HEME:  No easy bruising  INTEGUMENT: Positive for rash and pruritis.  NEURO:  Negative for headache    Past Medical History:   Diagnosis Date     AIDS (H)      Allergic rhinitis due to other allergen     DNS     Chronic abdominal pain      CNS toxoplasmosis (H)      Diabetes type 2, controlled (H)      GERD (gastroesophageal reflux disease)      HIV (human immunodeficiency virus infection) (H)      Periungual wart      Sleep apnea     doesn't use CPAP       HIV  History  Date of Diagnosis: 4/2015  Approximated time of transmission:2008  CD4 Josue: 24  Viral Load at Diagnosis: 171,654  Opportunistic Infections: Toxoplasmosis  CMV Status: IgG+ (4/12/15)  Toxo Status: + (4/12/15), Had CNS toxoplasmosis as his primary illness at the time of diagnosis  HLA  Status: 4/25/15 negative  Tuberculosis Screening: Exposure to a friend for treated for active TB several years ago. They did not live together.  Historical use of ARVs: Triumeq, Genvoya  Historical Resistance Mutations: Susceptible      Past Surgical History:   Procedure Laterality Date     C NONSPECIFIC PROCEDURE      right forearm fracture     COLONOSCOPY Left 1/22/2016    Procedure: COMBINED COLONOSCOPY, SINGLE OR MULTIPLE BIOPSY/POLYPECTOMY BY BIOPSY;  Surgeon: Clark Saini MD;  Location: [facility]     HC EXPLORE UNDESC TESTIS,INGUIN/SCROTAL       LAPAROSCOPIC APPENDECTOMY N/A 1/31/2018    Procedure: LAPAROSCOPIC APPENDECTOMY;  LAPAROSCOPIC APPENDECTOMY;  Surgeon: Dawn Holt MD;  Location: [facility]     LAPAROSCOPY DIAGNOSTIC (GENERAL) N/A 7/26/2016    Procedure: LAPAROSCOPY DIAGNOSTIC (GENERAL);  Surgeon: Susannah Arriaga MD;  Location: [facility]     LAPAROSCOPY DIAGNOSTIC (GENERAL) N/A 4/16/2018    Procedure: LAPAROSCOPY DIAGNOSTIC (GENERAL);  Diagnostic laparoscopy and lysis of adhesions;  Surgeon: Prince Dowling MD;  Location: [facility]     OPTICAL TRACKING SYSTEM CRANIOTOMY, EXCISE TUMOR, COMBINED Left 4/10/2015    Procedure: COMBINED OPTICAL TRACKING SYSTEM CRANIOTOMY, EXCISE TUMOR;  Surgeon: Mirlande Colmenares MD;  Location: [facility]     REPAIR GAMEKEEPER'S THUMB Right 12/2/2016    Procedure: REPAIR LIGAMENT ULNAR COLLATERAL THUMB (GAMEKEEPER'S);  Surgeon: Evin Zamorano MD;  Location: [facility]       Family History   Problem Relation Age of Onset     Diabetes Brother      Diabetes Father      Alzheimer Disease Father      Unknown/Adopted Mother      Diabetes Paternal Grandfather       Cancer No family hx of      no skin cancer     Skin Cancer No family hx of      no famiy hx of skin cancer     Glaucoma No family hx of      Macular Degeneration No family hx of        Social History     Social History Narrative    Born in [facility].  Came to the [place] in 1993.  Last traveled to visit family in 2008.          Social History   Substance Use Topics     Smoking status: Current Every Day Smoker     Packs/day: 0.50     Types: Cigarettes     Last attempt to quit: 10/28/2016     Smokeless tobacco: Former User      Comment: 4 cigarettes     Alcohol use No      Comment: Last etoh in 2007       Immunization History   Administered Date(s) Administered     HepB 03/10/2016, 06/16/2016     HepB-Adult 08/31/2017     Influenza (IIV3) PF 10/17/2016     Influenza Vaccine IM 3yrs+ 4 Valent IIV4 12/22/2015, 12/14/2017     Mantoux Tuberculin Skin Test 04/12/2015     Meningococcal (Menactra ) 08/31/2015, 03/10/2016     Pneumo Conj 13-V (2010&after) 03/10/2016     Pneumococcal 23 valent 06/16/2016     TDAP Vaccine (Boostrix) 10/17/2016       Patient Active Problem List   Diagnosis     Allergic rhinitis due to other allergen     Brain lesion     Toxoplasma encephalitis (H)     Pulmonary nodules     Human immunodeficiency virus I infection (H)     Folliculitis     Prurigo nodularis     Epidermal cyst     Shoulder joint pain, unspecified laterality     CNS toxoplasmosis (H)     Constipation     Non-intractable vomiting with nausea, vomiting of unspecified type     Thrush     Insomnia, unspecified insomnia     Bowel obstruction     Toxoplasmosis     Gastroesophageal reflux disease     Headache     Aphthous ulcer     Type 2 diabetes mellitus (H)     Small bowel obstruction     SBO (small bowel obstruction)     Slow transit constipation     Periungual wart     Herpes zoster     Erectile dysfunction, unspecified erectile dysfunction type     Insomnia, unspecified type     Preventative health care     Pain of right thumb      Rupture of ulnar collateral ligament of right thumb     Aftercare following surgery of the musculoskeletal system     Panic disorder     Generalized anxiety disorder     Major depressive disorder, single episode, unspecified     Abdominal pain     Acute appendicitis with localized peritonitis     S/P appendectomy     Abdominal abscess (H)     Appendicitis     Otitis media     Horseshoe tear of retina of left eye without detachment       Current Outpatient Prescriptions   Medication Sig     Bictegravir-Emtricitab-Tenofov -25 MG TABS Take 1 tablet by mouth daily     blood glucose (NO BRAND SPECIFIED) lancets standard Use to test blood sugar four times daily or as directed. (Patient not taking: Reported on 7/30/2018)     blood glucose monitoring (GELACIO CONTOUR NEXT) test strip Use to test blood sugar 3 times daily or as directed.  Ok to substitute alternative if insurance prefers. (Patient not taking: Reported on 7/30/2018)     blood glucose monitoring (GELACIO MICROLET) lancets Use to test blood sugar 2 times daily or as directed.  Ok to substitute alternative if insurance prefers. (Patient not taking: Reported on 7/30/2018)     blood glucose monitoring (NO BRAND SPECIFIED) meter device kit Use to test blood sugar 4 times daily or as directed. (Patient not taking: Reported on 7/30/2018)     blood glucose monitoring (NO BRAND SPECIFIED) meter device kit Use to test blood sugar four times daily or as directed. (Patient not taking: Reported on 7/30/2018)     blood glucose monitoring (NO BRAND SPECIFIED) test strip 1 strip by In Vitro route 4 times daily (before meals and nightly) Use to test blood sugars 4 times daily or as directed (Patient not taking: Reported on 7/30/2018)     blood glucose monitoring (NO BRAND SPECIFIED) test strip Use to test blood sugars four times daily or as directed (Patient not taking: Reported on 7/30/2018)     empagliflozin (JARDIANCE) 10 MG TABS tablet Take 1 tablet by mouth daily      "insulin glargine (LANTUS SOLOSTAR) 100 UNIT/ML pen Take 14 units each morning     insulin pen needle (B-D U/F) 31G X 8 MM Use 1 pen needles daily or as directed.     insulin pen needle (BD SCOTT U/F) 32G X 4 MM Use 1 daily as directed.     omeprazole (PRILOSEC) 20 MG CR capsule Take 1 capsule (20 mg) by mouth daily     Current Facility-Administered Medications   Medication     lidocaine (PF) (XYLOCAINE) 1 % injection 4 mL     triamcinolone acetonide (KENALOG-40) injection 40 mg       Allergies   Allergen Reactions     Metformin      Abdominal pain     Milk [Lac Bovis] Hives     Tylenol [Acetaminophen] Itching     Dulaglutide Rash              Physical Exam:   Vitals were reviewed.  All vitals stable  BP (!) 131/91 (Patient Position: Sitting)  Pulse 80  Resp 20  Ht 1.626 m (5' 4\")  Wt 78.9 kg (174 lb)  SpO2 99%  BMI 29.87 kg/m2     Exam:  GENERAL:  well-developed, well-nourished, alert, oriented, in no acute distress.  HEENT:  Head is normocephalic, atraumatic   EYES:  Eyes have anicteric sclerae.    ENT:  Oropharynx is moist without exudates or ulcers.  NECK:  Supple.  LUNGS:  Clear to auscultation.  CARDIOVASCULAR:  Regular rate and rhythm with no murmurs, gallops or rubs.  ABDOMEN:  Normal bowel sounds, soft, nontender.  SKIN: Multiple excoriated papules on extremities without surrounding erythema or discharge.  NEUROLOGIC:  Grossly nonfocal.         Laboratory Data:     Absolute CD4   Date Value Ref Range Status   07/19/2018 299 (L) 441 - 2156 cells/uL Final   04/26/2018 299 (L) 441 - 2156 cells/uL Final   03/07/2018 256 (L) 441 - 2156 cells/uL Final   12/12/2017 225 (L) 441 - 2156 cells/uL Final   08/11/2017 247 (L) 441 - 2156 cells/uL Final   02/22/2017 183 (L) 441 - 2156 cells/uL Final   11/07/2016 200 (L) 441 - 2156 cells/uL Final   08/04/2016 181 (L) 441 - 2156 cells/uL Final   05/20/2016 94 (L) 441 - 2156 cells/uL Final   02/25/2016 56 (L) 441 - 2156 cells/uL Final   01/10/2016 25 (L) 658 - 6839 " cells/uL Final   11/05/2015 31 (L) 441 - 2156 cells/uL Final   10/06/2015 63 (L) 441 - 2156 cells/uL Final     Comment:     Effective 12/08/2014, the reference range for this assay has changed to   reflect   new methodology.     06/29/2015 141 (L) 441 - 2156 cells/uL Final     Comment:     Effective 12/08/2014, the reference range for this assay has changed to   reflect   new methodology.     05/26/2015 72 (L) 441 - 2156 cells/uL Final     Comment:     Effective 12/08/2014, the reference range for this assay has changed to   reflect   new methodology.     04/10/2015 26 (L) 441 - 2156 cells/uL Final     Comment:     Effective 12/08/2014, the reference range for this assay has changed to   reflect   new methodology.         Inflammatory Markers    Recent Labs   Lab Test  03/06/18   2105  12/28/16   1540  12/26/16   2213  12/18/15   0354  07/07/15   2205  05/25/15   2217  05/21/15   1300  04/18/15   0844   SED   --    --    --    --   25*  37*   --    --    CRP  53.0*  <2.9  <2.9  5.3  7.8  5.0  <2.9  3.8       Metabolic Studies       Recent Labs   Lab Test  07/19/18   1147  07/04/18   0521  07/03/18   0930  05/11/18   2037  05/10/18   0347  04/26/18   1206  04/20/18   0219  04/18/18   0634  04/17/18   0656  04/16/18   0510   01/24/18   0926   NA   --   134  139  135  136  138  140  140  138   --    < >   --    POTASSIUM   --   4.3  3.4  3.5  3.5  3.9  3.4  3.5  3.8   --    < >   --    CHLORIDE   --   101  102  102  100  105  105  108  107   --    < >   --    CO2   --   24  25  26  26  25  26  25  24   --    < >   --    ANIONGAP   --   9  12  7  10  8  9  7  8   --    < >   --    BUN   --   16  11  15  14  11  13  9  8   --    < >   --    CR   --   0.77  0.88  0.80  0.76  0.85  0.89  0.72  0.65*   --    < >   --    GFRESTIMATED   --   >90  >90  >90  >90  >90  90  >90  >90   --    < >   --    GLC   --   210*  216*  222*  371*  149*  135*  117*  127*   --    < >   --    A1C  8.9*   --    --    --    --    --    --    --     --    --    --    --    LINDA   --   9.5  10.0  9.1  9.0  9.2  8.9  8.6  8.4*   --    < >   --    PHOS   --    --    --    --    --    --    --   2.8  2.9   --    --    --    MAG   --    --    --    --    --    --    --   2.0  1.9   --    < >   --    LACT   --    --    --    --    --    --    --    --    --   1.0   --   1.1    < > = values in this interval not displayed.       Hematology Studies      Recent Labs   Lab Test  07/19/18   1147  07/04/18   0521  07/03/18   0930  05/11/18   2037  05/10/18   0347  04/26/18   1206  04/20/18   0219   04/16/18   0139   WBC  6.7  9.5  11.5*  7.7  7.9  6.6  7.8   < >  12.0*   ANEU   --   6.0  8.1  4.4  4.5   --   4.3   --   6.9   ALYM   --   2.5  2.4  2.3  2.3   --   2.6   --   3.8   BRITT   --   0.6  0.8  0.7  0.7   --   0.5   --   0.8   AEOS   --   0.3  0.1  0.2  0.3   --   0.3   --   0.4   HGB  15.1  14.7  16.0  13.6  13.1*  13.6  14.1   < >  15.0   HCT  44.5  42.7  45.6  40.0  38.4*  40.1  41.9   < >  44.2   PLT  270  229  256  251  241  286  269   < >  295    < > = values in this interval not displayed.       Clotting Studies    Recent Labs   Lab Test  02/05/18   0901  01/01/18   0412  11/06/17   2346  05/26/17   1501   01/09/16   0550   04/09/15   2100   INR  1.06  0.98  0.91  1.00   < >  1.06   < >  1.04   PTT   --   32   --    --    --   30   --   32    < > = values in this interval not displayed.       Urine Studies     Recent Labs   Lab Test  07/03/18   1040  04/16/18   0645  02/04/18   1640  11/07/17   0715  08/11/17   1342   URINEPH  6.5  7.0  7.0  8.0*  5.0   NITRITE  Negative  Negative  Negative  Negative  Negative   LEUKEST  Negative  Negative  Negative  Negative  Negative   WBCU  <1  None  2  <1  <1       CSF testing     Recent Labs   Lab Test  05/10/16   1345   CWBC  4   CRBC  1   CGLU  92*   CTP  55       Microbiology:  Last 6 Culture results with specimen source  Culture Micro   Date Value Ref Range Status   02/05/2018 Moderate growth  Escherichia coli   (A)   Final   02/05/2018 Heavy growth  Streptococcus anginosus   (A)  Final   02/05/2018 Heavy growth  Mixed gram positive sushma    Final   02/05/2018 (A)  Final    Heavy growth  Bacteroides fragilis group  Susceptibility testing not routinely done     02/05/2018 (A)  Final    Heavy growth  Bacteroides fragilis  Susceptibility testing not routinely done     02/05/2018 (A)  Final    Plus  Heavy growth  Mixed aerobic and anaerobic sushma     02/05/2018 Canceled, Test credited  Final   02/05/2018 Incorrectly ordered by PCU/Clinic  Final   02/05/2018 Test reordered as correct code  Final   02/05/2018 Culture negative after 4 weeks  Final    Specimen Description   Date Value Ref Range Status   03/29/2018 Feces  Final   02/22/2018 Throat  Final   02/22/2018 Rectal  Final   02/05/2018 Appendix Abscess  Final   02/05/2018 Appendix Abscess  Final   02/05/2018 Appendix Abscess  Final   02/05/2018 Appendix Abscess  Final   02/05/2018 Appendix Abscess  Final        Last check of C difficile  C Diff Toxin B PCR   Date Value Ref Range Status   03/29/2018 Negative NEG^Negative Final     Comment:     Negative: Clostridium difficile target DNA sequences NOT detected, presumed   negative for Clostridium difficile toxin B or the number of bacteria present   may be below the limit of detection for the test.  FDA approved assay performed using [employer] GeneXpert real-time PCR.  A negative result does not exclude actual disease due to Clostridium difficile   and may be due to improper collection, handling and storage of the specimen   or the number of organisms in the specimen is below the detection limit of the   assay.         EBV Qualitative PCR   Date Value Ref Range Status   05/10/2016   Final    Not Detected  (Note)  NOT DETECTED - A negative result does not rule out the  presence of PCR inhibitors in the patient specimen or assay  specific nucleic acid in concentrations below the level of  detection by the assay.  INTERPRETIVE  INFORMATION:  Hugo Barr Virus by PCR  Test developed and characteristics determined by [email or web address]. See Compliance Statement A: [employer]  Performed by [email or web address],  [address], [facility] [place] [address] [phone]  [email or web address], Nayan Corley MD, Lab. Director       EBV PCR Specimen Source   Date Value Ref Range Status   05/10/2016 Cerebrospinal fluid  Final       Hepatitis B Testing     Recent Labs   Lab Test  04/13/15   0816   HBCAB  Nonreactive   HEPBANG  Nonreactive   HBCM  Nonreactive   A nonreactive result suggests lack of recent exposure to the virus in the   preceding 6 months.          Hepatitis C Antibody   Date Value Ref Range Status   03/02/2017  NR Final    Nonreactive   Assay performance characteristics have not been established for newborns,   infants, and children     04/13/2015  NR Final    Nonreactive   Assay performance characteristics have not been established for newborns,   infants, and children         RSV Rapid Antigen Result   Date Value Ref Range Status   01/31/2018 Positive (A) NEG^Negative Final     Comment:     Test results must be correlated with clinical data. If necessary, results   should be confirmed by a molecular assay or viral culture.         CMV Antibody IgG   Date Value Ref Range Status   04/12/2015 (H) 0.0 - 0.8 AI Final    >8.0  Positive   Antibody index (AI) values reflect qualitative changes in antibody   concentration that cannot be directly associated with clinical condition or   disease state.         Pathology:  Reviewed in Epic. No recent relevant results.      Imaging:  Results for orders placed or performed during the hospital encounter of 07/04/18   CT Abdomen Pelvis w Contrast    Narrative    Examination:  CT ABDOMEN PELVIS W CONTRAST 7/4/2018 7:30 AM     Comparison: 4/20/2018, 4/16/2018    History: abd pain, HIV - eval colitis;     Technique: Volumetric helical acquisition of CT images from the lung  bases through the symphysis pubis after  the administration of IV  contrast. Coronal and sagittal images were reconstructed from the  source data.    Findings:    Lung bases:   4 mm nodule in the right lung on the first image. Heart size is  normal. No pleural or pericardial effusion.    Abdomen/pelvis:  Hepatic steatosis. Stable hypodensity in the anterior liver segment IV  along the falciform ligament likely representing focal fatty  deposition. Unchanged 8 mm probable cyst in the right lobe of the  liver. The liver is otherwise unremarkable. The spleen, kidneys,  adrenal glands, and pancreas appear normal. There are dilated loops of  small bowel in the midabdomen measuring up to 3.9 cm. Transition point  is noted on series 2, image 45. Bowel wall enhancement is normal.  There is no wall thickening. No pneumatosis, free fluid or free  intraperitoneal gas. The colon is unremarkable. The bladder is  predominantly decompressed with mild wall thickening, unchanged.  Partially visualized penile prosthesis. The major transabdominal  vasculature is patent and within normal limits for caliber. No  intra-abdominal lymphadenopathy.    Bones:   No acute osseus abnormality or suspicious bony lesion.      Impression    Impression: Small bowel obstruction with transition point in the right  lower quadrant. Appearance is similar to, although less severe, than  4/16/2018.     [Result: Small bowel obstruction]    Finding was identified on 7/4/2018 8:12 AM.     Dr. Lehman was contacted by Dr. Smith at 7/4/2018 8:17 AM and  verbalized understanding of the urgent finding.     I have personally reviewed the examination and initial interpretation  and I agree with the findings.    MEGHNA LEWIS MD

## 2018-08-17 LAB
HIV1 RNA # PLAS NAA DL=20: <20 {COPIES}/ML
HIV1 RNA SERPL NAA+PROBE-LOG#: <1.3 {LOG_COPIES}/ML

## 2018-08-21 ENCOUNTER — OFFICE VISIT (OUTPATIENT)
Dept: DERMATOLOGY | Facility: CLINIC | Age: 53
End: 2018-08-21
Payer: COMMERCIAL

## 2018-08-21 DIAGNOSIS — L72.9 CYST OF SKIN: Primary | ICD-10-CM

## 2018-08-21 ASSESSMENT — PAIN SCALES - GENERAL
PAINLEVEL: NO PAIN (0)
PAINLEVEL: NO PAIN (0)

## 2018-08-21 NOTE — PATIENT INSTRUCTIONS

## 2018-08-21 NOTE — NURSING NOTE
Chief Complaint   Patient presents with     Derm Problem     Excision Left Naso Ocular      Roro Campos, EMT

## 2018-08-21 NOTE — NURSING NOTE
Excision performed on the left medial canthus. Injected 1.0ml of Xylocaine  (Lidocaine 1% and Epinephrine  (1:100,000))      Present for procedure:  Dr. Haynes, Resident MD Dr. Hannah Wren LPN    Site was dressed with vaseline, telfa, and then was taped in place. Wound care instructions reviewed, patient denies pain.   Pascale Wren LPN

## 2018-08-21 NOTE — LETTER
8/21/2018     RE: Andrew Lockwood  [address]  [facility] [address]     Dear Colleague,    Thank you for referring your patient, Andrew Lockwood, to the [facility] DERMATOLOGIC SURGERY at [facility]. Please see a copy of my visit note below.    DERMATOLOGY EXCISION PROCEDURE NOTE    Dermatology Problem List:  1. 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 Folliculitis   3. Prurigo nodularis   4. Periungual wart     NAME OF PROCEDURE: Excision intermediate layered linear closure  Staff surgeon: Mick Young DO  Resident: Varun Haynes MD   Scrub Nurse: RASHEED Lainez     PRE-OPERATIVE DIAGNOSIS:  Cyst   POST-OPERATIVE DIAGNOSIS: same   FINAL EXCISION SIZE(DEFECT SIZE): 0.6 cm, with no margin   FINAL REPAIR LENGTH: 1.0 cm     INDICATIONS: This patient presented with a 0.6 cm cyst of the left nasal bridge. Excision was indicated. We discussed the principles of treatment and most likely complications including scarring, bleeding, infection, incomplete excision, wound dehiscence, pain, nerve damage, and recurrence. Informed consent was obtained and the patient underwent the procedure as follows:    PROCEDURE: The patient was taken to the operative suite. Time-out was performed.  The treatment area was anesthetized with 1% lidocaine and epinephrine (1:100,000). The area was prepped with Chlorhexidine and rinsed with sterile saline and draped with sterile towels. The lesion was delineated and incised down to the cyst capsule. The cyst capsule was dissected from adjacent connective tissue with scissors. The cyst cavity was irrigated with sterile normal saline. Hemostasis was obtained by electrocoagulation.     REPAIR: An intermediate layered linear closure was selected as the procedure which would maximally preserve both function and cosmesis.    After the excision of the tumor, the area was carefully undermined. Hemostasis was obtained with electrocoagulation.  Closure was  oriented so that the wound was in the patient's natural skin tension lines. The epidermis was then carefully approximated along the length of the wound using 5-0 fast absorbing gut simple interrupted sutures.     The final wound length was 1.0 cm. A total of 2.5 ml of anesthesia was administered for all surgical sites. Estimated blood loss was less than 10 ml for all surgical sites. A sterile pressure dressing was applied and wound care instructions, with a written handout, were given. The patient was discharged from the [facility] alert and ambulatory.    Follow-up as needed for new or changing lesions.      Anatomic Pathology Results: pending      Staff Involved:    Scribe Disclosure  I, Kevin Simmons, am serving as a scribe to document services personally performed by Dr. Mick Young DO, based on data collection and the provider's statements to me.       Staff Physician Comments:   I saw and evaluated the patient with the resident (Dr. Seymour Haynes) and I agree with the above description of the procedure.   I was physically present for all key portions of the procedure today. I was immediately available at all times.    Mick Young DO    Department of Dermatology  [facility]: Phone: [phone], Fax:[phone]  [facility]: Phone: [phone], Fax: [phone]

## 2018-08-21 NOTE — MR AVS SNAPSHOT
After Visit Summary   8/21/2018    Andrew Lockwodo    MRN: [id number]           Patient Information     Date Of Birth          11/27/1967        Visit Information        Provider Department      8/21/2018 3:00 PM Mick Young MD [facility] Dermatologic Surgery        Today's Diagnoses     Cyst of skin    -  1      Care Instructions    Wound Care After a Biopsy    What is a skin biopsy?  A skin biopsy allows the doctor to examine a very small piece of tissue under the microscope to determine the diagnosis and the best treatment for the skin condition. A local anesthetic (numbing medicine)  is injected with a very small needle into the skin area to be tested. A small piece of skin is taken from the area. Sometimes a suture (stitch) is used.     What are the risks of a skin biopsy?  I will experience scar, bleeding, swelling, pain, crusting and redness. I may experience incomplete removal or recurrence. Risks of this procedure are excessive bleeding, bruising, infection, nerve damage, numbness, thick (hypertrophic or keloidal) scar and non-diagnostic biopsy.    How should I care for my wound for the first 24 hours?    Keep the wound dry and covered for 24 hours    If it bleeds, hold direct pressure on the area for 15 minutes. If bleeding does not stop then go to the emergency room    Avoid strenuous exercise the first 1-2 days or as your doctor instructs you    How should I care for the wound after 24 hours?    After 24 hours, remove the bandage    You may bathe or shower as normal    If you had a scalp biopsy, you can shampoo as usual and can use shower water to clean the biopsy site daily    Clean the wound twice a day with gentle soap and water    Do not scrub, be gentle    Apply white petroleum/Vaseline after cleaning the wound with a cotton swab or a clean finger, and keep the site covered with a Bandaid /bandage. Bandages are not necessary with a scalp biopsy    If you are unable to cover the site  with a Bandaid /bandage, re-apply ointment 2-3 times a day to keep the site moist. Moisture will help with healing    Avoid strenuous activity for first 1-2 days    Avoid lakes, rivers, pools, and oceans until the stitches are removed or the site is healed    How do I clean my wound?    Wash hands thoroughly with soap or use hand  before all wound care    Clean the wound with gentle soap and water    Apply white petroleum/Vaseline  to wound after it is clean    Replace the Bandaid /bandage to keep the wound covered for the first few days or as instructed by your doctor    If you had a scalp biopsy, warm shower water to the area on a daily basis should suffice    What should I use to clean my wound?     Cotton-tipped applicators (Qtips )    White petroleum jelly (Vaseline ). Use a clean new container and use Q-tips to apply.    Bandaids   as needed    Gentle soap     How should I care for my wound long term?    Do not get your wound dirty    Keep up with wound care for one week or until the area is healed.    A small scab will form and fall off by itself when the area is completely healed. The area will be red and will become pink in color as it heals. Sun protection is very important for how your scar will turn out. Sunscreen with an SPF 30 or greater is recommended once the area is healed.    If you have stitches, stitches need to be removed in 10-14 days. You may return to our clinic for this or you may have it done locally at your doctor s office.    You should have some soreness but it should be mild and slowly go away over several days. Talk to your doctor about using tylenol for pain,    When should I call my doctor?  If you have increased:     Pain or swelling    Pus or drainage (clear or slightly yellow drainage is ok)    Temperature over 100F    Spreading redness or warmth around wound    When will I hear about my results?  The biopsy results can take 2-3 weeks to come back. The clinic will call  you with the results, send you a [employer] message, or have you schedule a follow-up clinic or phone time to discuss the results. Contact our clinics if you do not hear from us in 3 weeks.     Who should I call with questions?    [facility]: [phone]     [facility]: [phone]    For urgent needs outside of business hours call the [facility] at [phone] and ask for the dermatology resident on call              Follow-ups after your visit        Your next 10 appointments already scheduled     Oct 08, 2018  1:00 PM CDT   (Arrive by 12:45 PM)   Return Visit with Rekha Membreno MD   [facility] ([facility])    [address] [address] [phone]              Who to contact     Please call your clinic at [phone] to:    Ask questions about your health    Make or cancel appointments    Discuss your medicines    Learn about your test results    Speak to your doctor            Additional Information About Your Visit        [employer] Information     [employer] gives you secure access to your electronic health record. If you see a primary care provider, you can also send messages to your care team and make appointments. If you have questions, please call your primary care clinic.  If you do not have a primary care provider, please call [phone] and they will assist you.      [employer] is an electronic gateway that provides easy, online access to your medical records. With [employer], you can request a clinic appointment, read your test results, renew a prescription or communicate with your care team.     To access your existing account, please contact your [facility] or call [phone] for assistance.        Care EveryWhere ID     This is your Care EveryWhere ID. This could be used by other organizations to access your [facility]  records  [id number]         Blood Pressure from Last 3 Encounters:   09/11/18 134/74   09/09/18 (!) 137/94   09/06/18 132/68    Weight from Last 3 Encounters:   09/09/18 77.6 kg (171 lb)   09/06/18 78 kg (171 lb 14.4 oz)   08/30/18 79.8 kg (176 lb)              We Performed the Following     71622 - EXC BENIGN SKIN LESION FACE/EARS 0.6-1.0 CM     Dermatological path order and indications     REPAIR SUPERFICIAL, WOUND FACE/EAR <2.5 CM        Primary Care Provider Office Phone # Fax #    Rubio Cronin [phone]       No address on file        Equal Access to Services     RA JORDAN : Apollo Cody, waaxda lugee, qaybta kaalmada axel, dez moreno . So [facility] [phone].    ATENCIÓN: Si habla español, tiene a kohli disposición servicios gratuitos de asistencia lingüística. Llame al [phone].    We comply with applicable federal civil rights laws and [place] laws. We do not discriminate on the basis of race, color, national origin, age, disability, sex, sexual orientation, or gender identity.            Thank you!     Thank you for choosing [facility] DERMATOLOGIC SURGERY  for your care. Our goal is always to provide you with excellent care. Hearing back from our patients is one way we can continue to improve our services. Please take a few minutes to complete the written survey that you may receive in the mail after your visit with us. Thank you!             Your Updated Medication List - Protect others around you: Learn how to safely use, store and throw away your medicines at www.disposemymeds.org.          This list is accurate as of 8/21/18 11:59 PM.  Always use your most recent med list.                   Brand Name Dispense Instructions for use Diagnosis    Bictegravir-Emtricitab-Tenofov -25 MG Tabs     30 tablet    Take 1 tablet by mouth daily    HIV (human immunodeficiency virus infection) (H)       blood glucose lancets standard     no brand specified    100 Box    Use to test blood sugar four times daily or as directed.    Type 2 diabetes mellitus without complication, without long-term current use of insulin (H)       blood glucose monitoring meter device kit    no brand specified    1 kit    Use to test blood sugar 4 times daily or as directed.    Type 2 diabetes mellitus with complication, without long-term current use of insulin (H)       blood glucose monitoring test strip    no brand specified    100 strip    1 strip by In Vitro route 4 times daily (before meals and nightly) Use to test blood sugars 4 times daily or as directed    Type 2 diabetes mellitus with complication, without long-term current use of insulin (H)       empagliflozin 10 MG Tabs tablet    JARDIANCE    90 tablet    Take 1 tablet by mouth daily    Diabetes mellitus, type 2 (H)       insulin pen needle 31G X 8 MM    B-D U/F    100 each    Use 1 pen needles daily or as directed.    Type 2 diabetes mellitus without complication, without long-term current use of insulin (H)       LANTUS SOLOSTAR 100 UNIT/ML injection   Generic drug:  insulin glargine     15 mL    Inject 50 Units Subcutaneous every morning    Type 2 diabetes mellitus with hyperglycemia, without long-term current use of insulin (H)       omeprazole 20 MG CR capsule    priLOSEC    30 capsule    Take 1 capsule (20 mg) by mouth daily

## 2018-08-21 NOTE — PROGRESS NOTES
DERMATOLOGY EXCISION PROCEDURE NOTE    Dermatology Problem List:  1. 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 Folliculitis   3. Prurigo nodularis   4. Periungual wart     NAME OF PROCEDURE: Excision intermediate layered linear closure  Staff surgeon: Mick Young DO  Resident: Varun Haynes MD   Scrub Nurse: RASHEED Lainez     PRE-OPERATIVE DIAGNOSIS:  Cyst   POST-OPERATIVE DIAGNOSIS: same   FINAL EXCISION SIZE(DEFECT SIZE): 0.6 cm, with no margin   FINAL REPAIR LENGTH: 1.0 cm     INDICATIONS: This patient presented with a 0.6 cm cyst of the left nasal bridge. Excision was indicated. We discussed the principles of treatment and most likely complications including scarring, bleeding, infection, incomplete excision, wound dehiscence, pain, nerve damage, and recurrence. Informed consent was obtained and the patient underwent the procedure as follows:    PROCEDURE: The patient was taken to the operative suite. Time-out was performed.  The treatment area was anesthetized with 1% lidocaine and epinephrine (1:100,000). The area was prepped with Chlorhexidine and rinsed with sterile saline and draped with sterile towels. The lesion was delineated and incised down to the cyst capsule. The cyst capsule was dissected from adjacent connective tissue with scissors. The cyst cavity was irrigated with sterile normal saline. Hemostasis was obtained by electrocoagulation.     REPAIR: An intermediate layered linear closure was selected as the procedure which would maximally preserve both function and cosmesis.    After the excision of the tumor, the area was carefully undermined. Hemostasis was obtained with electrocoagulation.  Closure was oriented so that the wound was in the patient's natural skin tension lines. The epidermis was then carefully approximated along the length of the wound using 5-0 fast absorbing gut simple interrupted sutures.     The final wound length was 1.0 cm. A total of 2.5 ml of anesthesia was  administered for all surgical sites. Estimated blood loss was less than 10 ml for all surgical sites. A sterile pressure dressing was applied and wound care instructions, with a written handout, were given. The patient was discharged from the [facility]atologic Surgery Center alert and ambulatory.    Follow-up as needed for new or changing lesions.      Anatomic Pathology Results: pending      Staff Involved:    Scribe Disclosure  I, Kevin Simmons, am serving as a scribe to document services personally performed by Dr. Mick Young DO, based on data collection and the provider's statements to me.       Staff Physician Comments:   I saw and evaluated the patient with the resident (Dr. Seymour Haynes) and I agree with the above description of the procedure.   I was physically present for all key portions of the procedure today. I was immediately available at all times.    Mick Young DO    Department of Dermatology  [facility]: Phone: [phone], Fax:[phone]  [facility]: Phone: [phone], Fax: [phone]

## 2018-08-24 ENCOUNTER — HOSPITAL ENCOUNTER (EMERGENCY)
Facility: CLINIC | Age: 53
Discharge: HOME | End: 2018-08-24
Attending: EMERGENCY MEDICINE | Admitting: EMERGENCY MEDICINE
Payer: COMMERCIAL

## 2018-08-24 VITALS
WEIGHT: 170 LBS | SYSTOLIC BLOOD PRESSURE: 123 MMHG | DIASTOLIC BLOOD PRESSURE: 81 MMHG | TEMPERATURE: 98.5 F | HEIGHT: 64 IN | BODY MASS INDEX: 29.02 KG/M2 | RESPIRATION RATE: 18 BRPM | HEART RATE: 93 BPM | OXYGEN SATURATION: 99 %

## 2018-08-24 DIAGNOSIS — L03.031 PARONYCHIA OF TOE, RIGHT: ICD-10-CM

## 2018-08-24 DIAGNOSIS — L03.011 PARONYCHIA, FINGER, RIGHT: ICD-10-CM

## 2018-08-24 LAB — COPATH REPORT: NORMAL

## 2018-08-24 PROCEDURE — 10061 I&D ABSCESS COMP/MULTIPLE: CPT | Mod: Z6 | Performed by: EMERGENCY MEDICINE

## 2018-08-24 PROCEDURE — 10061 I&D ABSCESS COMP/MULTIPLE: CPT | Performed by: EMERGENCY MEDICINE

## 2018-08-24 PROCEDURE — 99282 EMERGENCY DEPT VISIT SF MDM: CPT | Mod: 25 | Performed by: EMERGENCY MEDICINE

## 2018-08-24 PROCEDURE — 99283 EMERGENCY DEPT VISIT LOW MDM: CPT | Mod: 25 | Performed by: EMERGENCY MEDICINE

## 2018-08-24 RX ORDER — CEPHALEXIN 500 MG/1
500 CAPSULE ORAL 4 TIMES DAILY
Qty: 28 CAPSULE | Refills: 0 | Status: SHIPPED | OUTPATIENT
Start: 2018-08-24 | End: 2018-08-31

## 2018-08-24 RX ORDER — BUPIVACAINE HYDROCHLORIDE 5 MG/ML
30 INJECTION, SOLUTION PERINEURAL ONCE
Status: DISCONTINUED | OUTPATIENT
Start: 2018-08-24 | End: 2018-08-24 | Stop reason: HOSPADM

## 2018-08-24 RX ORDER — BUPIVACAINE HYDROCHLORIDE 5 MG/ML
INJECTION, SOLUTION EPIDURAL; INTRACAUDAL
Status: DISCONTINUED
Start: 2018-08-24 | End: 2018-08-24 | Stop reason: HOSPADM

## 2018-08-24 ASSESSMENT — ENCOUNTER SYMPTOMS
FEVER: 0
CHILLS: 0

## 2018-08-24 NOTE — DISCHARGE INSTRUCTIONS
Please make an appointment to follow up with Your Primary Care Provider in 3 days for wound check.    Paronychia of the Finger or Toe  Paronychia is an infection near a fingernail or toenail. It usually occurs when an opening in the cuticle or an ingrown toenail lets bacteria under the skin.  The infection will need to be drained if pus is present. If the infection has been caught early, you may need only antibiotic treatment. Healing will take about 1 to 2 weeks.  Home care  Follow these guidelines when caring for yourself at home:    Clean and soak the toe or finger. Do this 2 times a day for the first 3 days. To do so:  ? Soak your foot or hand in a tub of warm water for 5 minutes. Or hold your toe or finger under a faucet of warm running water for 5 minutes.  ? Clean any crust away with soap and water using a cotton swab.  ? Put antibiotic ointment on the infected area.    Change the dressing daily or any time it gets dirty.    If you were given antibiotics, take them as directed until they are all gone.    If your infection is on a toe, wear comfortable shoes with a lot of toe room. You can also wear open-toed sandals while your toe heals.    You may use over-the-counter medicine (acetaminophen or ibuprofen to help with pain, unless another medicine was prescribed. If you have chronic liver or kidney disease, talk with your healthcare provider before using these medicines. Also talk with your provider if you've had a stomach ulcer or GI (gastrointestinal) bleeding.  Prevention  The following can prevent paronychia:    Avoid cutting or playing with your cuticles at home.    Don't bite your nails.    Don't suck on your thumbs or fingers.  Follow-up care  Follow up with your healthcare provider, or as advised.  When to seek medical advice  Call your healthcare provider right away if any of these occur:    Redness, pain, or swelling of the finger or toe gets worse    Red streaks in the skin leading away from the  wound    Pus or fluid draining from the nail area    Fever of 100.4 F (38 C) or higher, or as directed by your provider  Date Last Reviewed: 8/1/2016 2000-2017 [employer]. [address] [place] [address], [place] [address]. All rights reserved. This information is not intended as a substitute for professional medical care. Always follow your healthcare professional's instructions.

## 2018-08-24 NOTE — ED TRIAGE NOTES
Pt arrived in triage with concern of R foot pain and possible wound infection. Pt states there is purulent drainage under the band aid.

## 2018-08-24 NOTE — ED AVS SNAPSHOT
[facility], Emergency Department    [address] [address]    Phone:  [phone]                                       Andrew Lockwood   MRN: [id number]    Department:  [facility], Emergency Department   Date of Visit:  8/24/2018           Patient Information     Date Of Birth          11/27/1967        Your diagnoses for this visit were:     Paronychia of toe, right     Paronychia, finger, right        You were seen by Jose Wolff MD.      Follow-up Information     Follow up with Rubio Cronin MD. Schedule an appointment as soon as possible for a visit in 3 days.    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    Paronychia of the Finger or Toe  Paronychia is an infection near a fingernail or toenail. It usually occurs when an opening in the cuticle or an ingrown toenail lets bacteria under the skin.  The infection will need to be drained if pus is present. If the infection has been caught early, you may need only antibiotic treatment. Healing will take about 1 to 2 weeks.  Home care  Follow these guidelines when caring for yourself at home:    Clean and soak the toe or finger. Do this 2 times a day for the first 3 days. To do so:  ? Soak your foot or hand in a tub of warm water for 5 minutes. Or hold your toe or finger under a faucet of warm running water for 5 minutes.  ? Clean any crust away with soap and water using a cotton swab.  ? Put antibiotic ointment on the infected area.    Change the dressing daily or any time it gets dirty.    If you were given antibiotics, take them as directed until they are all gone.    If your infection is on a toe, wear comfortable shoes with a lot of toe  room. You can also wear open-toed sandals while your toe heals.    You may use over-the-counter medicine (acetaminophen or ibuprofen to help with pain, unless another medicine was prescribed. If you have chronic liver or kidney disease, talk with your healthcare provider before using these medicines. Also talk with your provider if you've had a stomach ulcer or GI (gastrointestinal) bleeding.  Prevention  The following can prevent paronychia:    Avoid cutting or playing with your cuticles at home.    Don't bite your nails.    Don't suck on your thumbs or fingers.  Follow-up care  Follow up with your healthcare provider, or as advised.  When to seek medical advice  Call your healthcare provider right away if any of these occur:    Redness, pain, or swelling of the finger or toe gets worse    Red streaks in the skin leading away from the wound    Pus or fluid draining from the nail area    Fever of 100.4 F (38 C) or higher, or as directed by your provider  Date Last Reviewed: 8/1/2016 2000-2017 [employer]. [address] [place] [address]. All rights reserved. This information is not intended as a substitute for professional medical care. Always follow your healthcare professional's instructions.          Your next 10 appointments already scheduled     Sep 06, 2018  9:30 AM CDT   (Arrive by 9:15 AM)   RETURN DIABETES with Lenora Haley PA-C   [facility] Endocrinology ([facility])    [address] [place] [id number] [phone]            Oct 08, 2018  1:00 PM CDT   (Arrive by 12:45 PM)   Return Visit with Rekha Membreno MD   [facility] ([facility])    [address] [place] [id number] [phone]              24 Hour Appointment Hotline       To make an appointment at any [facility], call [phone] ([phone]). If you don't have a family doctor or  clinic, we will help you find one. [facility] are conveniently located to serve the needs of you and your family.             Review of your medicines      START taking        Dose / Directions Last dose taken    cephALEXin 500 MG capsule   Commonly known as:  KEFLEX   Dose:  500 mg   Quantity:  28 capsule        Take 1 capsule (500 mg) by mouth 4 times daily for 7 days   Refills:  0          Our records show that you are taking the medicines listed below. If these are incorrect, please call your family doctor or clinic.        Dose / Directions Last dose taken    Bictegravir-Emtricitab-Tenofov -25 MG Tabs   Dose:  1 tablet   Quantity:  30 tablet        Take 1 tablet by mouth daily   Refills:  11        blood glucose lancets standard   Commonly known as:  no brand specified   Quantity:  100 Box        Use to test blood sugar four times daily or as directed.   Refills:  5        blood glucose monitoring lancets   Quantity:  100 each        Use to test blood sugar 2 times daily or as directed.  Ok to substitute alternative if insurance prefers.   Refills:  11        * blood glucose monitoring meter device kit   Commonly known as:  no brand specified   Quantity:  1 kit        Use to test blood sugar four times daily or as directed.   Refills:  0        * blood glucose monitoring meter device kit   Commonly known as:  no brand specified   Quantity:  1 kit        Use to test blood sugar 4 times daily or as directed.   Refills:  0        * blood glucose monitoring test strip   Commonly known as:  no brand specified   Quantity:  100 each        Use to test blood sugars four times daily or as directed   Refills:  5        * blood glucose monitoring test strip   Commonly known as:  no brand specified   Dose:  1 strip   Quantity:  100 strip        1 strip by In Vitro route 4 times daily (before meals and nightly) Use to test blood sugars 4 times daily or as directed   Refills:  11        * blood glucose monitoring  test strip   Commonly known as:  GELACIO CONTOUR NEXT   Quantity:  100 strip        Use to test blood sugar 3 times daily or as directed.  Ok to substitute alternative if insurance prefers.   Refills:  11        empagliflozin 10 MG Tabs tablet   Commonly known as:  JARDIANCE   Quantity:  90 tablet        Take 1 tablet by mouth daily   Refills:  3        * insulin pen needle 31G X 8 MM   Commonly known as:  B-D U/F   Quantity:  100 each        Use 1 pen needles daily or as directed.   Refills:  0        * insulin pen needle 32G X 4 MM   Commonly known as:  BD SCOTT U/F   Quantity:  100 each        Use 1 daily as directed.   Refills:  11        LANTUS SOLOSTAR 100 UNIT/ML injection   Quantity:  15 mL   Generic drug:  insulin glargine        Take 14 units each morning   Refills:  3        omeprazole 20 MG CR capsule   Commonly known as:  priLOSEC   Dose:  20 mg   Quantity:  30 capsule        Take 1 capsule (20 mg) by mouth daily   Refills:  1        * Notice:  This list has 7 medication(s) that are the same as other medications prescribed for you. Read the directions carefully, and ask your doctor or other care provider to review them with you.            Prescriptions were sent or printed at these locations (1 Prescription)                   Other Prescriptions                Printed at Department/Unit printer (1 of 1)         cephALEXin (KEFLEX) 500 MG capsule                Procedures and tests performed during your visit     Incision and drainage      Orders Needing Specimen Collection     None      Pending Results     No orders found from 8/22/2018 to 8/25/2018.            Pending Culture Results     No orders found from 8/22/2018 to 8/25/2018.            Pending Results Instructions     If you had any lab results that were not finalized at the time of your Discharge, you can call the ED Lab Result RN at [phone]. You will be contacted by this team for any positive Lab results or changes in treatment. The nurses  are available 7 days a week from 10A to 6:30P.  You can leave a message 24 hours per day and they will return your call.        Thank you for choosing [facility]       Thank you for choosing [facility] for your care. Our goal is always to provide you with excellent care. Hearing back from our patients is one way we can continue to improve our services. Please take a few minutes to complete the written survey that you may receive in the mail after you visit with us. Thank you!        [employer] Information     [employer] gives you secure access to your electronic health record. If you see a primary care provider, you can also send messages to your care team and make appointments. If you have questions, please call your primary care clinic.  If you do not have a primary care provider, please call [phone] and they will assist you.        Care EveryWhere ID     This is your Care EveryWhere ID. This could be used by other organizations to access your [facility] medical records  [id number]        Equal Access to Services     RA JORDAN : Hadii carissa Cody, warebecca centeno, luis enrique kaalmaadeline reyna, dez moreno . So [facility] [phone].    ATENCIÓN: Si habla español, tiene a kohli disposición servicios gratuitos de asistencia lingüística. Llame al [phone].    We comply with applicable federal civil rights laws and [place] laws. We do not discriminate on the basis of race, color, national origin, age, disability, sex, sexual orientation, or gender identity.            After Visit Summary       This is your record. Keep this with you and show to your community pharmacist(s) and doctor(s) at your next visit.

## 2018-08-24 NOTE — ED AVS SNAPSHOT
[facility], [place], Emergency Department    [address] [place] [id number]    Phone:  [phone]                                       Andrew Lockwood   MRN: [id number]    Department:  [facility], Emergency Department   Date of Visit:  8/24/2018           After Visit Summary Signature Page     I have received my discharge instructions, and my questions have been answered. I have discussed any challenges I see with this plan with the nurse or doctor.    ..........................................................................................................................................  Patient/Patient Representative Signature      ..........................................................................................................................................  Patient Representative Print Name and Relationship to Patient    ..................................................               ................................................  Date                                            Time    ..........................................................................................................................................  Reviewed by Signature/Title    ...................................................              ..............................................  Date                                                            Time          22EPIC Rev 08/18

## 2018-08-24 NOTE — ED PROVIDER NOTES
History     Chief Complaint   Patient presents with     Foot Pain     Wound Check     The history is provided by the patient.     Andrew Lockwood is a 50 year old male with a history of type II diabetes, HIV/AIDS, CNS toxoplasmosis, sleep apnea and GERD, who presents to the Emergency Department for evaluation of right foot pain and right hand pain. Patient complains of right foot pain particulrly of the lateral 2nd toe that began three days ago with purulent drainage. He has been covering this with a Band-Aid. Additionally, patient presents with pain of right fifth digit secondary to an abscess he also noticed three days prior, which has not drained yet. He denies any fevers or chills. He denies any obvious trauma or injuries. Patient is a diabetic and as such was concerned for infection prompting his arrival today. He reports taking all his medications as prescribed.     I have reviewed the Medications, Allergies, Past Medical and Surgical History, and Social History in the [facility] system.    PAST MEDICAL HISTORY:   Past Medical History:   Diagnosis Date     AIDS (H)      Allergic rhinitis due to other allergen     DNS     Chronic abdominal pain      CNS toxoplasmosis (H)      Diabetes type 2, controlled (H)      GERD (gastroesophageal reflux disease)      HIV (human immunodeficiency virus infection) (H)      Periungual wart      Sleep apnea     doesn't use CPAP       PAST SURGICAL HISTORY:   Past Surgical History:   Procedure Laterality Date     C NONSPECIFIC PROCEDURE      right forearm fracture     COLONOSCOPY Left 1/22/2016    Procedure: COMBINED COLONOSCOPY, SINGLE OR MULTIPLE BIOPSY/POLYPECTOMY BY BIOPSY;  Surgeon: Clark Saini MD;  Location: [facility] GI     HC EXPLORE UNDESC TESTIS,INGUIN/SCROTAL       LAPAROSCOPIC APPENDECTOMY N/A 1/31/2018    Procedure: LAPAROSCOPIC APPENDECTOMY;  LAPAROSCOPIC APPENDECTOMY;  Surgeon: Dawn Holt MD;  Location: [facility] OR     LAPAROSCOPY DIAGNOSTIC (GENERAL)  N/A 7/26/2016    Procedure: LAPAROSCOPY DIAGNOSTIC (GENERAL);  Surgeon: Susannah Arriaga MD;  Location: [facility] OR     LAPAROSCOPY DIAGNOSTIC (GENERAL) N/A 4/16/2018    Procedure: LAPAROSCOPY DIAGNOSTIC (GENERAL);  Diagnostic laparoscopy and lysis of adhesions;  Surgeon: Prince Dowling MD;  Location: [facility] OR     OPTICAL TRACKING SYSTEM CRANIOTOMY, EXCISE TUMOR, COMBINED Left 4/10/2015    Procedure: COMBINED OPTICAL TRACKING SYSTEM CRANIOTOMY, EXCISE TUMOR;  Surgeon: Mirlande Colmenares MD;  Location: [facility] OR     REPAIR GAMEKEEPER'S THUMB Right 12/2/2016    Procedure: REPAIR LIGAMENT ULNAR COLLATERAL THUMB (GAMEKEEPER'S);  Surgeon: Evin Zamorano MD;  Location: [facility] OR       FAMILY HISTORY:   Family History   Problem Relation Age of Onset     Diabetes Brother      Diabetes Father      Alzheimer Disease Father      Unknown/Adopted Mother      Diabetes Paternal Grandfather      Cancer No family hx of      no skin cancer     Skin Cancer No family hx of      no famiy hx of skin cancer     Glaucoma No family hx of      Macular Degeneration No family hx of        SOCIAL HISTORY:   Social History   Substance Use Topics     Smoking status: Current Every Day Smoker     Packs/day: 0.50     Types: Cigarettes     Last attempt to quit: 10/28/2016     Smokeless tobacco: Former User      Comment: 4 cigarettes     Alcohol use No      Comment: Last etoh in 2007     Current Facility-Administered Medications   Medication     lidocaine (PF) (XYLOCAINE) 1 % injection 4 mL     triamcinolone acetonide (KENALOG-40) injection 40 mg     Current Outpatient Prescriptions   Medication     Bictegravir-Emtricitab-Tenofov -25 MG TABS     blood glucose (NO BRAND SPECIFIED) lancets standard     blood glucose monitoring (GELACIO CONTOUR NEXT) test strip     blood glucose monitoring (GELACIO MICROLET) lancets     blood glucose monitoring (NO BRAND SPECIFIED) meter device kit     blood glucose monitoring (NO BRAND SPECIFIED) meter device kit      "blood glucose monitoring (NO BRAND SPECIFIED) test strip     blood glucose monitoring (NO BRAND SPECIFIED) test strip     empagliflozin (JARDIANCE) 10 MG TABS tablet     insulin glargine (LANTUS SOLOSTAR) 100 UNIT/ML pen     insulin pen needle (B-D U/F) 31G X 8 MM     insulin pen needle (BD SCOTT U/F) 32G X 4 MM     omeprazole (PRILOSEC) 20 MG CR capsule        Allergies   Allergen Reactions     Metformin      Abdominal pain     Milk [Lac Bovis] Hives     Tylenol [Acetaminophen] Itching     Dulaglutide Rash       Review of Systems   Constitutional: Negative for chills and fever.   Musculoskeletal:        Positive for pain and swelling of the right 2nd toe and right fifth digit.    All other systems reviewed and are negative.      Physical Exam   BP: 122/82  Pulse: 93  Temp: 98.5  F (36.9  C)  Resp: 18  Height: 162.6 cm (5' 4\")  Weight: 77.1 kg (170 lb)  SpO2: 99 %      Physical Exam   Constitutional: No distress.   HENT:   Head: Atraumatic.   Mouth/Throat: Oropharynx is clear and moist. No oropharyngeal exudate.   Eyes: Pupils are equal, round, and reactive to light. No scleral icterus.   Cardiovascular: Normal heart sounds and intact distal pulses.    Pulmonary/Chest: Breath sounds normal. No respiratory distress.   Abdominal: Soft. Bowel sounds are normal. There is no tenderness.   Musculoskeletal: He exhibits no edema or tenderness.        Right hand: He exhibits swelling (pinky finger).        Right foot: There is swelling (lateral 2nd toe with pain).   Skin: Skin is warm. No rash noted. He is not diaphoretic.       ED Course     ED Course     Incision + drainage  Date/Time: 8/24/2018 1:16 PM  Performed by: ESTELA RYAN  Authorized by: ESTELA RYAN     Consent:     Consent obtained:  Verbal    Consent given by:  Patient    Risks discussed:  Bleeding, incomplete drainage, infection and pain    Alternatives discussed:  No treatment and delayed treatment  Location:     Type:  Abscess    Location:  Upper " extremity    Upper extremity location:  Finger    Finger location:  R small finger  Pre-procedure details:     Skin preparation:  Chloraprep  Anesthesia (see MAR for exact dosages):     Anesthesia method:  Local infiltration    Local anesthetic:  Bupivacaine 0.5% w/o epi  Procedure type:     Complexity:  Simple  Procedure details:     Incision types:  Stab incision    Scalpel blade:  11    Wound management:  Probed and deloculated    Drainage:  Purulent    Drainage amount:  Moderate    Wound treatment:  Wound left open    Packing materials:  1/4 in gauze  Post-procedure details:     Patient tolerance of procedure:  Tolerated well, no immediate complications  Comments:      I&D also performed on 2nd digit of R foot.                   Critical Care time:  none             Labs Ordered and Resulted from Time of ED Arrival Up to the Time of Departure from the ED - No data to display         Assessments & Plan (with Medical Decision Making)   Patient is a 50-year-old male with a history of diabetes and HIV who presents with pain to his fifth digit on his right hand and pain to the second digit on his right foot.  This pain and swelling started 3 days prior to arrival.  Initial vitals were within normal limits, patient afebrile.  Exam notable for paronychia of fifth digit on right hand and paronychia of second digit right foot.  Incision and drainage was performed on both areas with good purulent output.  See attached procedure note for further details.  Given patient's underlying medical conditions, will also treat with 1 week of Keflex.  Patient was strongly advised to follow-up with his PCP in 3 days for wound check.  He was also given very strict return precautions for worsening infection or pain.  Patient remained stable during his entire stay in the emergency department.    I have reviewed the nursing notes.    I have reviewed the findings, diagnosis, plan and need for follow up with the patient.    New  Prescriptions    CEPHALEXIN (KEFLEX) 500 MG CAPSULE    Take 1 capsule (500 mg) by mouth 4 times daily for 7 days       Final diagnoses:   Paronychia of toe, right   Paronychia, finger, right     IEva, am serving as a trained medical scribe to document services personally performed by Jose Wolff MD, based on the provider's statements to me.   Jose KELLY MD, was physically present and have reviewed and verified the accuracy of this note documented by Eva Loaiza.    8/24/2018   [facility], [place], EMERGENCY DEPARTMENT     Jose Wolff MD  08/24/18 [id number]

## 2018-08-30 ENCOUNTER — HOSPITAL ENCOUNTER (EMERGENCY)
Facility: CLINIC | Age: 53
Discharge: HOME | End: 2018-08-30
Attending: EMERGENCY MEDICINE | Admitting: EMERGENCY MEDICINE
Payer: COMMERCIAL

## 2018-08-30 VITALS
BODY MASS INDEX: 30.05 KG/M2 | OXYGEN SATURATION: 97 % | RESPIRATION RATE: 18 BRPM | TEMPERATURE: 98.7 F | DIASTOLIC BLOOD PRESSURE: 92 MMHG | WEIGHT: 176 LBS | HEART RATE: 99 BPM | SYSTOLIC BLOOD PRESSURE: 119 MMHG | HEIGHT: 64 IN

## 2018-08-30 DIAGNOSIS — L29.9 PRURITIC DISORDER: ICD-10-CM

## 2018-08-30 DIAGNOSIS — R21 RASH AND NONSPECIFIC SKIN ERUPTION: ICD-10-CM

## 2018-08-30 PROCEDURE — 99284 EMERGENCY DEPT VISIT MOD MDM: CPT | Mod: Z6 | Performed by: EMERGENCY MEDICINE

## 2018-08-30 PROCEDURE — 99282 EMERGENCY DEPT VISIT SF MDM: CPT | Performed by: EMERGENCY MEDICINE

## 2018-08-30 RX ORDER — HYDROXYZINE HYDROCHLORIDE 25 MG/1
25-50 TABLET, FILM COATED ORAL EVERY 6 HOURS PRN
Qty: 20 TABLET | Refills: 0 | Status: SHIPPED | OUTPATIENT
Start: 2018-08-30 | End: 2018-09-04

## 2018-08-30 ASSESSMENT — ENCOUNTER SYMPTOMS
ADENOPATHY: 0
ROS SKIN COMMENTS: ITCHING
NAUSEA: 0
SHORTNESS OF BREATH: 0
VOMITING: 0
CHILLS: 0
FEVER: 0

## 2018-08-30 NOTE — ED PROVIDER NOTES
"    [place] EMERGENCY DEPARTMENT ([facility])  8/30/18   History     Chief Complaint   Patient presents with     Pruritis     The history is provided by the patient.     Andrew Lockwood is a 50 year old male with a medical history significant for HIV/AIDS, type 2 diabetes mellitus and CNS toxoplasmosis who presents to the Emergency Department for evaluation of \"bug bites\" all over his body.  Patient woke up this morning with these bites, he is currently staying in a shelter.  He states that he has gotten bites similar to these in the past from shelters.  He denies any fevers, chest pain, shortness of breath, neck swelling or any other complaints.    I have reviewed the Medications, Allergies, Past Medical and Surgical History, and Social History in the [facility] system.    Past Medical History:   Diagnosis Date     AIDS (H)      Allergic rhinitis due to other allergen     DNS     Chronic abdominal pain      CNS toxoplasmosis (H)      Diabetes type 2, controlled (H)      GERD (gastroesophageal reflux disease)      HIV (human immunodeficiency virus infection) (H)      Periungual wart      Sleep apnea     doesn't use CPAP       Past Surgical History:   Procedure Laterality Date     C NONSPECIFIC PROCEDURE      right forearm fracture     COLONOSCOPY Left 1/22/2016    Procedure: COMBINED COLONOSCOPY, SINGLE OR MULTIPLE BIOPSY/POLYPECTOMY BY BIOPSY;  Surgeon: Clark Saini MD;  Location: [facility] GI     HC EXPLORE UNDESC TESTIS,INGUIN/SCROTAL       LAPAROSCOPIC APPENDECTOMY N/A 1/31/2018    Procedure: LAPAROSCOPIC APPENDECTOMY;  LAPAROSCOPIC APPENDECTOMY;  Surgeon: Dawn Holt MD;  Location: [facility] OR     LAPAROSCOPY DIAGNOSTIC (GENERAL) N/A 7/26/2016    Procedure: LAPAROSCOPY DIAGNOSTIC (GENERAL);  Surgeon: Susannah Arriaga MD;  Location: [facility] OR     LAPAROSCOPY DIAGNOSTIC (GENERAL) N/A 4/16/2018    Procedure: LAPAROSCOPY DIAGNOSTIC (GENERAL);  Diagnostic laparoscopy and lysis of adhesions;  Surgeon: " Prince Dowling MD;  Location: [facility] OR     OPTICAL TRACKING SYSTEM CRANIOTOMY, EXCISE TUMOR, COMBINED Left 4/10/2015    Procedure: COMBINED OPTICAL TRACKING SYSTEM CRANIOTOMY, EXCISE TUMOR;  Surgeon: Mirlande Colmenares MD;  Location: [facility] OR     REPAIR GAMEKEEPER'S THUMB Right 12/2/2016    Procedure: REPAIR LIGAMENT ULNAR COLLATERAL THUMB (GAMEKEEPER'S);  Surgeon: Evin Zamorano MD;  Location: [facility]       Family History   Problem Relation Age of Onset     Diabetes Brother      Diabetes Father      Alzheimer Disease Father      Unknown/Adopted Mother      Diabetes Paternal Grandfather      Cancer No family hx of      no skin cancer     Skin Cancer No family hx of      no famiy hx of skin cancer     Glaucoma No family hx of      Macular Degeneration No family hx of        Social History   Substance Use Topics     Smoking status: Current Every Day Smoker     Packs/day: 0.50     Types: Cigarettes     Last attempt to quit: 10/28/2016     Smokeless tobacco: Former User      Comment: 4 cigarettes     Alcohol use No      Comment: Last etoh in 2007       Current Facility-Administered Medications   Medication     lidocaine (PF) (XYLOCAINE) 1 % injection 4 mL     triamcinolone acetonide (KENALOG-40) injection 40 mg     Current Outpatient Prescriptions   Medication     hydrOXYzine (ATARAX) 25 MG tablet     Bictegravir-Emtricitab-Tenofov -25 MG TABS     blood glucose (NO BRAND SPECIFIED) lancets standard     blood glucose monitoring (GELACIO CONTOUR NEXT) test strip     blood glucose monitoring (GELACIO MICROLET) lancets     blood glucose monitoring (NO BRAND SPECIFIED) meter device kit     blood glucose monitoring (NO BRAND SPECIFIED) meter device kit     blood glucose monitoring (NO BRAND SPECIFIED) test strip     blood glucose monitoring (NO BRAND SPECIFIED) test strip     cephALEXin (KEFLEX) 500 MG capsule     empagliflozin (JARDIANCE) 10 MG TABS tablet     insulin glargine (LANTUS SOLOSTAR) 100 UNIT/ML pen      "insulin pen needle (B-D U/F) 31G X 8 MM     insulin pen needle (BD SCOTT U/F) 32G X 4 MM     omeprazole (PRILOSEC) 20 MG CR capsule        Allergies   Allergen Reactions     Metformin      Abdominal pain     Milk [Lac Bovis] Hives     Tylenol [Acetaminophen] Itching     Dulaglutide Rash         Review of Systems   Constitutional: Negative for chills and fever.   Respiratory: Negative for shortness of breath.    Cardiovascular: Negative for chest pain.   Gastrointestinal: Negative for nausea and vomiting.   Skin: Positive for rash.        Itching   Allergic/Immunologic: Positive for immunocompromised state.   Hematological: Negative for adenopathy.   All other systems reviewed and are negative.      Physical Exam   BP: (!) 151/100  Pulse: 99  Temp: 98.7  F (37.1  C)  Resp: 18  Height: 162.6 cm (5' 4\")  Weight: 79.8 kg (176 lb)  SpO2: 100 %      Physical Exam   Constitutional: He is oriented to person, place, and time. He appears well-developed and well-nourished. No distress.   HENT:   Head: Normocephalic and atraumatic.   Eyes: Conjunctivae and EOM are normal.   Neck: Normal range of motion.   Cardiovascular: Normal rate.    Pulmonary/Chest: Effort normal. No respiratory distress.   Musculoskeletal: Normal range of motion. He exhibits no edema, tenderness or deformity.   Neurological: He is alert and oriented to person, place, and time.   Skin: Skin is warm. Rash noted. He is not diaphoretic.   Maculopapular rash upper and lower extremities without evidence of induration, fluctuance, or tenderness.  There is blanching.  Lesions are in various stages of healing.   Psychiatric: He has a normal mood and affect. His behavior is normal. Judgment and thought content normal.   Nursing note and vitals reviewed.      ED Course     ED Course     Procedures             Critical Care time:  none             Labs Ordered and Resulted from Time of ED Arrival Up to the Time of Departure from the ED - No data to display     "     Assessments & Plan (with Medical Decision Making)   50-year-old man presenting with pruritus.  Differential diagnosis: Bedbugs, insect bites, unlikely cellulitis, unlikely folliculitis.    After thorough history and physical examination, patient appears to be in no acute distress.  I suspect bedbugs to be the most likely etiology of his symptoms.  Patient was advised to try and sleep in his own bed if possible.  He was also advised to wash his sheets.  However, this might be difficult to do since he has had multiple sense of homelessness in the past.  He will be given a prescription for hydroxyzine with recommendations to follow-up with dermatology clinic and his PCP.  He agrees with the plan.    I have reviewed the nursing notes.    I have reviewed the findings, diagnosis, plan and need for follow up with the patient.    New Prescriptions    HYDROXYZINE (ATARAX) 25 MG TABLET    Take 1-2 tablets (25-50 mg) by mouth every 6 hours as needed for itching       Final diagnoses:   Pruritic disorder   Rash and nonspecific skin eruption     IYunior, am serving as a trained medical scribe to document services personally performed by Horacio Wayne MD, based on the provider's statements to me.   Horacio KELLY MD, was physically present and have reviewed and verified the accuracy of this note documented by Yunior Mora.    8/30/2018   [facility], [place], EMERGENCY DEPARTMENT     Braulio Wayne MD  08/30/18 1526

## 2018-08-30 NOTE — ED TRIAGE NOTES
Pt arrives to ED with complaints of itching over arms, legs and stomach. Pt believes he was bitten by bed bugs last night in the shelter. A&O, VSS

## 2018-08-30 NOTE — DISCHARGE INSTRUCTIONS
Please make an appointment to follow up with Dermatology Clinic (phone: [phone]) in 4-5 days for further evaluation and recommendations.

## 2018-08-30 NOTE — ED AVS SNAPSHOT
[facility], Emergency Department    [address] [address]    Phone:  [phone]                                       Andrew Lockwood   MRN: [id number]    Department:  [facility], Emergency Department   Date of Visit:  8/30/2018           Patient Information     Date Of Birth          11/27/1967        Your diagnoses for this visit were:     Pruritic disorder     Rash and nonspecific skin eruption        You were seen by Braulio Wayne MD.        Discharge Instructions       Please make an appointment to follow up with Dermatology Clinic (phone: [phone]) in 4-5 days for further evaluation and recommendations.          Discharge References/Attachments     [facility] (ENGLISH)      Your next 10 appointments already scheduled     Sep 06, 2018  9:30 AM CDT   (Arrive by 9:15 AM)   RETURN DIABETES with Lenora Haley PA-C   [facility] ([facility])    [address] [place] [address]  [address] [facility] [address] [phone]            Oct 08, 2018  1:00 PM CDT   (Arrive by 12:45 PM)   Return Visit with Rekha Membreno MD   [facility] ([facility])    [address] [place] [address]  [address] [facility] [address] [phone]              24 Hour Appointment Hotline       To make an appointment at any [facility], call [phone] ([phone]). If you don't have a family doctor or clinic, we will help you find one. [facility] are conveniently located to serve the needs of you and your family.             Review of your medicines      START taking        Dose / Directions Last dose taken    hydrOXYzine 25 MG tablet   Commonly known as:  ATARAX   Dose:  25-50 mg   Quantity:  20 tablet        Take 1-2 tablets (25-50 mg) by mouth every 6 hours as needed for itching   Refills:  0          Our records show that you are taking the medicines listed below. If these are incorrect, please call your family  doctor or clinic.        Dose / Directions Last dose taken    Bictegravir-Emtricitab-Tenofov -25 MG Tabs   Dose:  1 tablet   Quantity:  30 tablet        Take 1 tablet by mouth daily   Refills:  11        blood glucose lancets standard   Commonly known as:  no brand specified   Quantity:  100 Box        Use to test blood sugar four times daily or as directed.   Refills:  5        blood glucose monitoring lancets   Quantity:  100 each        Use to test blood sugar 2 times daily or as directed.  Ok to substitute alternative if insurance prefers.   Refills:  11        * blood glucose monitoring meter device kit   Commonly known as:  no brand specified   Quantity:  1 kit        Use to test blood sugar four times daily or as directed.   Refills:  0        * blood glucose monitoring meter device kit   Commonly known as:  no brand specified   Quantity:  1 kit        Use to test blood sugar 4 times daily or as directed.   Refills:  0        * blood glucose monitoring test strip   Commonly known as:  no brand specified   Quantity:  100 each        Use to test blood sugars four times daily or as directed   Refills:  5        * blood glucose monitoring test strip   Commonly known as:  no brand specified   Dose:  1 strip   Quantity:  100 strip        1 strip by In Vitro route 4 times daily (before meals and nightly) Use to test blood sugars 4 times daily or as directed   Refills:  11        * blood glucose monitoring test strip   Commonly known as:  GELACIO CONTOUR NEXT   Quantity:  100 strip        Use to test blood sugar 3 times daily or as directed.  Ok to substitute alternative if insurance prefers.   Refills:  11        cephALEXin 500 MG capsule   Commonly known as:  KEFLEX   Dose:  500 mg   Quantity:  28 capsule        Take 1 capsule (500 mg) by mouth 4 times daily for 7 days   Refills:  0        empagliflozin 10 MG Tabs tablet   Commonly known as:  JARDIANCE   Quantity:  90 tablet        Take 1 tablet by mouth daily    Refills:  3        * insulin pen needle 31G X 8 MM   Commonly known as:  B-D U/F   Quantity:  100 each        Use 1 pen needles daily or as directed.   Refills:  0        * insulin pen needle 32G X 4 MM   Commonly known as:  BD SCOTT U/F   Quantity:  100 each        Use 1 daily as directed.   Refills:  11        LANTUS SOLOSTAR 100 UNIT/ML injection   Quantity:  15 mL   Generic drug:  insulin glargine        Take 14 units each morning   Refills:  3        omeprazole 20 MG CR capsule   Commonly known as:  priLOSEC   Dose:  20 mg   Quantity:  30 capsule        Take 1 capsule (20 mg) by mouth daily   Refills:  1        * Notice:  This list has 7 medication(s) that are the same as other medications prescribed for you. Read the directions carefully, and ask your doctor or other care provider to review them with you.            Prescriptions were sent or printed at these locations (1 Prescription)                   Other Prescriptions                Printed at Department/Unit printer (1 of 1)         hydrOXYzine (ATARAX) 25 MG tablet                Orders Needing Specimen Collection     None      Pending Results     No orders found from 8/28/2018 to 8/31/2018.            Pending Culture Results     No orders found from 8/28/2018 to 8/31/2018.            Pending Results Instructions     If you had any lab results that were not finalized at the time of your Discharge, you can call the ED Lab Result RN at [phone]. You will be contacted by this team for any positive Lab results or changes in treatment. The nurses are available 7 days a week from 10A to 6:30P.  You can leave a message 24 hours per day and they will return your call.        Thank you for choosing [facility]       Thank you for choosing [facility] for your care. Our goal is always to provide you with excellent care. Hearing back from our patients is one way we can continue to improve our services. Please take a few minutes to complete the written survey that  you may receive in the mail after you visit with us. Thank you!        [employer] Information     [email or web address] gives you secure access to your electronic health record. If you see a primary care provider, you can also send messages to your care team and make appointments. If you have questions, please call your primary care clinic.  If you do not have a primary care provider, please call [phone] and they will assist you.        Care EveryWhere ID     This is your Care EveryWhere ID. This could be used by other organizations to access your [facility] medical records  [id number]        Equal Access to Services     [facility] : Apollo Cody, maverick centeno, luis enrique reyna, dez salazar. So [facility] [phone].    ATENCIÓN: Si habla español, tiene a okhli disposición servicios gratuitos de asistencia lingüística. Gurdeep al [phone].    We comply with applicable federal civil rights laws and [place] laws. We do not discriminate on the basis of race, color, national origin, age, disability, sex, sexual orientation, or gender identity.            After Visit Summary       This is your record. Keep this with you and show to your community pharmacist(s) and doctor(s) at your next visit.

## 2018-08-31 ENCOUNTER — OFFICE VISIT (OUTPATIENT)
Dept: DERMATOLOGY | Facility: CLINIC | Age: 53
End: 2018-08-31
Payer: COMMERCIAL
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
## 2018-08-31 DIAGNOSIS — S20.96XA: Primary | ICD-10-CM

## 2018-08-31 DIAGNOSIS — L03.011 PARONYCHIA OF FINGER OF RIGHT HAND: ICD-10-CM
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
## 2018-08-31 RX ORDER — PERMETHRIN 50 MG/G
CREAM TOPICAL
Qty: 60 G | Refills: 1 | Status: ON HOLD | OUTPATIENT
Start: 2018-08-31 | End: 2018-09-10

## 2018-08-31 ASSESSMENT — PAIN SCALES - GENERAL: PAINLEVEL: NO PAIN (0)

## 2018-08-31 NOTE — PROGRESS NOTES
[facility] Dermatology Note      Dermatology Problem List:  1. 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 Folliculitis   3. Prurigo nodularis   4. Periungual wart   5. Paronychia - keflex 500 mg   6. Bug bites with allergic reaction - permethrin 5% cream, hydroxyzine 25-50mg  7. HIV +     Encounter Date: Aug 31, 2018    CC:  No chief complaint on file.        History of Present Illness:  Mr. Andrew Lockwood is a 50 year old male who presents today for evaluation of itchy lesions on his arms and legs. The patient was last seen by Dr. Young on 8/21/18, at which time a cyst was excised from his left nasal bridge. At today's visit, he reports that he went to the ER due to these bumps. At that time he was told that he might be having an allergic reaction to bug bites. He was also prescribed hydroxyzine, but he never picked this up. These lesions have been extremely itchy and difficult for him to not scratch. These are not the same as the prurigo nodules he experienced before. He is homeless, and notes that this seems to always happen to him on the nights he sleeps in a shelter and he has this less often when he sleeps outside or under bridges.The patient is otherwise feeling well. There are no other skin concerns at this time.      Past Medical History:   Patient Active Problem List   Diagnosis     Allergic rhinitis due to other allergen     Brain lesion     Toxoplasma encephalitis (H)     Pulmonary nodules     Human immunodeficiency virus I infection (H)     Folliculitis     Prurigo nodularis     Epidermal cyst     Shoulder joint pain, unspecified laterality     CNS toxoplasmosis (H)     Constipation     Non-intractable vomiting with nausea, vomiting of unspecified type     Thrush     Insomnia, unspecified insomnia     Bowel obstruction     Toxoplasmosis     Gastroesophageal reflux disease     Headache     Aphthous ulcer     Type 2 diabetes mellitus (H)     Small bowel obstruction     SBO  (small bowel obstruction)     Slow transit constipation     Periungual wart     Herpes zoster     Erectile dysfunction, unspecified erectile dysfunction type     Insomnia, unspecified type     Preventative health care     Pain of right thumb     Rupture of ulnar collateral ligament of right thumb     Aftercare following surgery of the musculoskeletal system     Panic disorder     Generalized anxiety disorder     Major depressive disorder, single episode, unspecified     Abdominal pain     Acute appendicitis with localized peritonitis     S/P appendectomy     Abdominal abscess (H)     Appendicitis     Otitis media     Horseshoe tear of retina of left eye without detachment     Past Medical History:   Diagnosis Date     AIDS (H)      Allergic rhinitis due to other allergen     DNS     Chronic abdominal pain      CNS toxoplasmosis (H)      Diabetes type 2, controlled (H)      GERD (gastroesophageal reflux disease)      HIV (human immunodeficiency virus infection) (H)      Periungual wart      Sleep apnea     doesn't use CPAP     Past Surgical History:   Procedure Laterality Date     C NONSPECIFIC PROCEDURE      right forearm fracture     COLONOSCOPY Left 1/22/2016    Procedure: COMBINED COLONOSCOPY, SINGLE OR MULTIPLE BIOPSY/POLYPECTOMY BY BIOPSY;  Surgeon: Clark Saini MD;  Location: [facility] GI     HC EXPLORE UNDESC TESTIS,INGUIN/SCROTAL       LAPAROSCOPIC APPENDECTOMY N/A 1/31/2018    Procedure: LAPAROSCOPIC APPENDECTOMY;  LAPAROSCOPIC APPENDECTOMY;  Surgeon: Dawn Holt MD;  Location: [facility] OR     LAPAROSCOPY DIAGNOSTIC (GENERAL) N/A 7/26/2016    Procedure: LAPAROSCOPY DIAGNOSTIC (GENERAL);  Surgeon: Susannah Arriaga MD;  Location: [facility] OR     LAPAROSCOPY DIAGNOSTIC (GENERAL) N/A 4/16/2018    Procedure: LAPAROSCOPY DIAGNOSTIC (GENERAL);  Diagnostic laparoscopy and lysis of adhesions;  Surgeon: Prince Dowling MD;  Location: [facility] OR     OPTICAL TRACKING SYSTEM CRANIOTOMY, EXCISE TUMOR, COMBINED  "Left 4/10/2015    Procedure: COMBINED OPTICAL TRACKING SYSTEM CRANIOTOMY, EXCISE TUMOR;  Surgeon: Mirlande Colmenares MD;  Location: [facility] OR     REPAIR GAMEKEEPER'S THUMB Right 12/2/2016    Procedure: REPAIR LIGAMENT ULNAR COLLATERAL THUMB (GAMEKEEPER'S);  Surgeon: Evin Zamorano MD;  Location:  OR       Social History:  Social History     Social History     Marital status: Single     Spouse name: N/A     Number of children: N/A     Years of education: N/A     Occupational History           5 years; temp agency     Social History Main Topics     Smoking status: Current Every Day Smoker     Packs/day: 0.50     Types: Cigarettes     Last attempt to quit: 10/28/2016     Smokeless tobacco: Former User      Comment: 4 cigarettes     Alcohol use No      Comment: Last etoh in 2007     Drug use: No      Comment: \"Not very often\"     Sexual activity: Not Currently     Partners: Female, Male      Comment: Last sexual activity 2008     Other Topics Concern     Not on file     Social History Narrative    Born in [facility].  [place] the [place] in [place].  Last traveled to visit family in 2008.        Patient is homeless, occasionally stays in homeless shelters and on the streets    Family History:  Family History   Problem Relation Age of Onset     Diabetes Brother      Diabetes Father      Alzheimer Disease Father      Unknown/Adopted Mother      Diabetes Paternal Grandfather      Cancer No family hx of      no skin cancer     Skin Cancer No family hx of      no famiy hx of skin cancer     Glaucoma No family hx of      Macular Degeneration No family hx of        Medications:  Current Outpatient Prescriptions   Medication Sig Dispense Refill     Bictegravir-Emtricitab-Tenofov -25 MG TABS Take 1 tablet by mouth daily 30 tablet 11     blood glucose (NO BRAND SPECIFIED) lancets standard Use to test blood sugar four times daily or as directed. 100 Box 5     blood glucose monitoring (GELACIO CONTOUR NEXT) " test strip Use to test blood sugar 3 times daily or as directed.  Ok to substitute alternative if insurance prefers. 100 strip 11     blood glucose monitoring (GELACIO MICROLET) lancets Use to test blood sugar 2 times daily or as directed.  Ok to substitute alternative if insurance prefers. 100 each 11     blood glucose monitoring (NO BRAND SPECIFIED) meter device kit Use to test blood sugar 4 times daily or as directed. 1 kit 0     blood glucose monitoring (NO BRAND SPECIFIED) meter device kit Use to test blood sugar four times daily or as directed. 1 kit 0     blood glucose monitoring (NO BRAND SPECIFIED) test strip 1 strip by In Vitro route 4 times daily (before meals and nightly) Use to test blood sugars 4 times daily or as directed 100 strip 11     blood glucose monitoring (NO BRAND SPECIFIED) test strip Use to test blood sugars four times daily or as directed 100 each 5     cephALEXin (KEFLEX) 500 MG capsule Take 1 capsule (500 mg) by mouth 4 times daily for 7 days 28 capsule 0     empagliflozin (JARDIANCE) 10 MG TABS tablet Take 1 tablet by mouth daily 90 tablet 3     hydrOXYzine (ATARAX) 25 MG tablet Take 1-2 tablets (25-50 mg) by mouth every 6 hours as needed for itching 20 tablet 0     insulin glargine (LANTUS SOLOSTAR) 100 UNIT/ML pen Take 14 units each morning 15 mL 3     insulin pen needle (B-D U/F) 31G X 8 MM Use 1 pen needles daily or as directed. 100 each 0     insulin pen needle (BD SCOTT U/F) 32G X 4 MM Use 1 daily as directed. 100 each 11     omeprazole (PRILOSEC) 20 MG CR capsule Take 1 capsule (20 mg) by mouth daily 30 capsule 1       Allergies   Allergen Reactions     Metformin      Abdominal pain     Milk [Lac Bovis] Hives     Tylenol [Acetaminophen] Itching     Dulaglutide Rash       Review of Systems:  -Skin Establ Pt: The patient denies any new rash, pruritus, or lesions that are symptomatic, changing or bleeding, except as per HPI.  -Constitutional: The patient is feeling generally  well.  -GI: no nausea, abdominal pain, vomiting, diarrhea or blood in the stool    Physical exam:  Vitals: There were no vitals taken for this visit.  GEN: This is a well developed, well-nourished male in no acute distress, in a pleasant mood.    SKIN: Sun-exposed skin, which includes the head/face, neck, both arms, digits, and/or nails was examined.   - Numerous erythematous 5-8mm papules on the trunk, legs and arms, some excoriated on the legs.   - Erythema and swelling of the distal right 5th finger consistent with paronychia.   - No other lesions of concern on areas examined.       Impression/Plan:  1. Bug bites with likely allergic reaction - suspect bed bugs     Start permethrin 5% cream - apply cream from head to toe, excluding the face. Leave on for 8-14 hours then wash off with water, then reapply in 1 week.    Continue with hydroxyzine 25-50mg nightly for itching    Keflex will help with prophylaxis of infection (see below)    2. Paronychia - patient was dx and prescribed keflex, but has not started taking this yet    Reminded the importance of taking Kelfex 500 mg he was prescribed due to his HIV status and higher risk for infection.       Follow-up as needed for new or changing lesions.       Staff Involved:    Scribe Disclosure  I, Kevin Birch, am serving as a scribe to document services personally performed by hafsa Garcia PA-C, based on data collection and the provider's statements to me.     Provider Disclosure:   The documentation recorded by the scribe accurately reflects the services I personally performed and the decisions made by me.    All risks, benefits and alternatives were discussed with patient.  Patient is in agreement and understands the assessment and plan.  All questions were answered.    Hafsa Garcia PA-C  [facility] [place]: Phone: [phone], Fax: [phone]

## 2018-08-31 NOTE — NURSING NOTE
Dermatology Rooming Note    Andrew Lockwood's goals for this visit include:   Chief Complaint   Patient presents with     Derm Problem     Andrew is here today to have his skin checked- thinks he has bed bug bits.      Emmanuelle Perez MA

## 2018-08-31 NOTE — MR AVS SNAPSHOT
After Visit Summary   8/31/2018    Andrew Lockwood    MRN: [id number]           Patient Information     Date Of Birth          11/27/1967        Visit Information        Provider Department      8/31/2018 2:30 PM Annamarie Garcia PA-C [facility] D[facility]at[facility]        Today's Diagnoses     Arthropod bite of multiple sites of trunk, initial encounter    -  1    Paronychia of finger of right hand           Follow-ups after your visit        Follow-up notes from your care team     Return if symptoms worsen or fail to improve.      Your next 10 appointments already scheduled     Sep 06, 2018  9:30 AM CDT   (Arrive by 9:15 AM)   RETURN DIABETES with BHAVANA Rivera [facility] ([facility])    [address] [place] [id number] [phone]            Oct 08, 2018  1:00 PM CDT   (Arrive by 12:45 PM)   Return Visit with Rekha Membreno MD   [facility] ([facility])    [address] [place] [id number] [phone]              Who to contact     Please call your clinic at [phone] to:    Ask questions about your health    Make or cancel appointments    Discuss your medicines    Learn about your test results    Speak to your doctor            Additional Information About Your Visit        Right Relevance Information     [employer] gives you secure access to your electronic health record. If you see a primary care provider, you can also send messages to your care team and make appointments. If you have questions, please call your primary care clinic.  If you do not have a primary care provider, please call [phone] and they will assist you.      [employer] is an electronic gateway that provides easy, online access to your medical records. With [employer], you can request a clinic appointment, read your test results, renew a prescription or communicate with your care team.     To access  your existing account, please contact your [facility] or call [phone] for assistance.        Care EveryWhere ID     This is your Care EveryWhere ID. This could be used by other organizations to access your [facility] medical records  [id number]         Blood Pressure from Last 3 Encounters:   08/30/18 (!) 119/92   08/24/18 123/81   08/15/18 (!) 131/91    Weight from Last 3 Encounters:   08/30/18 79.8 kg (176 lb)   08/24/18 77.1 kg (170 lb)   08/15/18 78.9 kg (174 lb)              Today, you had the following     No orders found for display         Today's Medication Changes          These changes are accurate as of 8/31/18  4:06 PM.  If you have any questions, ask your nurse or doctor.               Start taking these medicines.        Dose/Directions    permethrin 5 % cream   Commonly known as:  ELIMITE   Used for:  Bug bite, initial encounter   Started by:  Annamarie Garcia, BHAVANA        Apply cream from head to toe (except the face); leave on for 8-14 hours then wash off with water; reapply in 1 week   Quantity:  60 g   Refills:  1            Where to get your medicines      These medications were sent to [facility] [address] [place] [id number] [place] [address]  [address] [id number]    Hours:  TRANSPLANT PHONE NUMBER [phone] Phone:  [phone]     permethrin 5 % cream                Primary Care Provider Office Phone # Fax #    Rubio Cronin [phone]       [address]  [facility] [address]        Equal Access to Services     RA JORDAN AH: Hadmagnolia stephens Soterence, warenettada luqadaha, qaybta kaalmadez najera. So [facility] [phone].    ATENCIÓN: Si habla español, tiene a kohli disposición servicios gratuitos de asistencia lingüística. Llame al [phone].    We comply with applicable federal civil rights laws and [place]  laws. We do not discriminate on the basis of race, color, national origin, age, disability, sex, sexual orientation, or gender identity.            Thank you!     Thank you for choosing [facility] DERMAT[facility]  for your care. Our goal is always to provide you with excellent care. Hearing back from our patients is one way we can continue to improve our services. Please take a few minutes to complete the written survey that you may receive in the mail after your visit with us. Thank you!             Your Updated Medication List - Protect others around you: Learn how to safely use, store and throw away your medicines at www.disposemymeds.org.          This list is accurate as of 8/31/18  4:06 PM.  Always use your most recent med list.                   Brand Name Dispense Instructions for use Diagnosis    Bictegravir-Emtricitab-Tenofov -25 MG Tabs     30 tablet    Take 1 tablet by mouth daily    HIV (human immunodeficiency virus infection) (H)       blood glucose lancets standard    no brand specified    100 Box    Use to test blood sugar four times daily or as directed.    Type 2 diabetes mellitus without complication, without long-term current use of insulin (H)       blood glucose monitoring lancets     100 each    Use to test blood sugar 2 times daily or as directed.  Ok to substitute alternative if insurance prefers.    Type 2 diabetes mellitus with hyperglycemia, without long-term current use of insulin (H)       * blood glucose monitoring meter device kit    no brand specified    1 kit    Use to test blood sugar four times daily or as directed.    Type 2 diabetes mellitus without complication, without long-term current use of insulin (H)       * blood glucose monitoring meter device kit    no brand specified    1 kit    Use to test blood sugar 4 times daily or as directed.    Type 2 diabetes mellitus with complication, without long-term current use of insulin (H)       * blood glucose monitoring test strip     no brand specified    100 each    Use to test blood sugars four times daily or as directed    Type 2 diabetes mellitus without complication, without long-term current use of insulin (H)       * blood glucose monitoring test strip    no brand specified    100 strip    1 strip by In Vitro route 4 times daily (before meals and nightly) Use to test blood sugars 4 times daily or as directed    Type 2 diabetes mellitus with complication, without long-term current use of insulin (H)       * blood glucose monitoring test strip    GELACIO CONTOUR NEXT    100 strip    Use to test blood sugar 3 times daily or as directed.  Ok to substitute alternative if insurance prefers.    Type 2 diabetes mellitus with hyperglycemia, without long-term current use of insulin (H)       cephALEXin 500 MG capsule    KEFLEX    28 capsule    Take 1 capsule (500 mg) by mouth 4 times daily for 7 days        empagliflozin 10 MG Tabs tablet    JARDIANCE    90 tablet    Take 1 tablet by mouth daily    Diabetes mellitus, type 2 (H)       hydrOXYzine 25 MG tablet    ATARAX    20 tablet    Take 1-2 tablets (25-50 mg) by mouth every 6 hours as needed for itching        * insulin pen needle 31G X 8 MM    B-D U/F    100 each    Use 1 pen needles daily or as directed.    Type 2 diabetes mellitus without complication, without long-term current use of insulin (H)       * insulin pen needle 32G X 4 MM    BD SCOTT U/F    100 each    Use 1 daily as directed.    Type 2 diabetes mellitus with hyperglycemia, without long-term current use of insulin (H)       LANTUS SOLOSTAR 100 UNIT/ML injection   Generic drug:  insulin glargine     15 mL    Take 14 units each morning    Type 2 diabetes mellitus with hyperglycemia, without long-term current use of insulin (H)       omeprazole 20 MG CR capsule    priLOSEC    30 capsule    Take 1 capsule (20 mg) by mouth daily        permethrin 5 % cream    ELIMITE    60 g    Apply cream from head to toe (except the face); leave on for  8-14 hours then wash off with water; reapply in 1 week    Bug bite, initial encounter       * Notice:  This list has 7 medication(s) that are the same as other medications prescribed for you. Read the directions carefully, and ask your doctor or other care provider to review them with you.

## 2018-08-31 NOTE — PROGRESS NOTES
[facility] Staff Note: Mr. Lockwood was seen, examined, and the case was discussed with Dr. Carbajal, ID Fellow -- I agree with her interval history and examination, assessment and plan in this outpatient ID / HIV Progress note. This note reflects my observations and opinions and the plan outlined fully reflects my approach. I have reviewed the available history, radiology, laboratory results, and reports with the Fellow.

## 2018-08-31 NOTE — LETTER
8/31/2018     RE: Andrew Lockwood  [address]  [facility] [address]     Dear Colleague,    Thank you for referring your patient, Andrew Lockwood, to the [facility] DERMATOLOGY at [facility]. Please see a copy of my visit note below.    [facility] Dermatology Note      Dermatology Problem List:  1. 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 Folliculitis   3. Prurigo nodularis   4. Periungual wart   5. Paronychia - keflex 500 mg   6. Bug bites with allergic reaction - permethrin 5% cream, hydroxyzine 25-50mg  7. HIV +     Encounter Date: Aug 31, 2018    CC:  No chief complaint on file.        History of Present Illness:  Mr. Andrew Lockwood is a 50 year old male who presents today for evaluation of itchy lesions on his arms and legs. The patient was last seen by Dr. Young on 8/21/18, at which time a cyst was excised from his left nasal bridge. At today's visit, he reports that he went to the ER due to these bumps. At that time he was told that he might be having an allergic reaction to bug bites. He was also prescribed hydroxyzine, but he never picked this up. These lesions have been extremely itchy and difficult for him to not scratch. These are not the same as the prurigo nodules he experienced before. He is homeless, and notes that this seems to always happen to him on the nights he sleeps in a shelter and he has this less often when he sleeps outside or under bridges.The patient is otherwise feeling well. There are no other skin concerns at this time.      Past Medical History:   Patient Active Problem List   Diagnosis     Allergic rhinitis due to other allergen     Brain lesion     Toxoplasma encephalitis (H)     Pulmonary nodules     Human immunodeficiency virus I infection (H)     Folliculitis     Prurigo nodularis     Epidermal cyst     Shoulder joint pain, unspecified laterality     CNS toxoplasmosis (H)     Constipation     Non-intractable vomiting  with nausea, vomiting of unspecified type     Thrush     Insomnia, unspecified insomnia     Bowel obstruction     Toxoplasmosis     Gastroesophageal reflux disease     Headache     Aphthous ulcer     Type 2 diabetes mellitus (H)     Small bowel obstruction     SBO (small bowel obstruction)     Slow transit constipation     Periungual wart     Herpes zoster     Erectile dysfunction, unspecified erectile dysfunction type     Insomnia, unspecified type     Preventative health care     Pain of right thumb     Rupture of ulnar collateral ligament of right thumb     Aftercare following surgery of the musculoskeletal system     Panic disorder     Generalized anxiety disorder     Major depressive disorder, single episode, unspecified     Abdominal pain     Acute appendicitis with localized peritonitis     S/P appendectomy     Abdominal abscess (H)     Appendicitis     Otitis media     Horseshoe tear of retina of left eye without detachment     Past Medical History:   Diagnosis Date     AIDS (H)      Allergic rhinitis due to other allergen     DNS     Chronic abdominal pain      CNS toxoplasmosis (H)      Diabetes type 2, controlled (H)      GERD (gastroesophageal reflux disease)      HIV (human immunodeficiency virus infection) (H)      Periungual wart      Sleep apnea     doesn't use CPAP     Past Surgical History:   Procedure Laterality Date     C NONSPECIFIC PROCEDURE      right forearm fracture     COLONOSCOPY Left 1/22/2016    Procedure: COMBINED COLONOSCOPY, SINGLE OR MULTIPLE BIOPSY/POLYPECTOMY BY BIOPSY;  Surgeon: Clark Saini MD;  Location: [facility] GI     HC EXPLORE UNDESC TESTIS,INGUIN/SCROTAL       LAPAROSCOPIC APPENDECTOMY N/A 1/31/2018    Procedure: LAPAROSCOPIC APPENDECTOMY;  LAPAROSCOPIC APPENDECTOMY;  Surgeon: Dawn Holt MD;  Location: [facility] OR     LAPAROSCOPY DIAGNOSTIC (GENERAL) N/A 7/26/2016    Procedure: LAPAROSCOPY DIAGNOSTIC (GENERAL);  Surgeon: Susannah Arriaga MD;  Location:  "[facility] OR     LAPAROSCOPY DIAGNOSTIC (GENERAL) N/A 4/16/2018    Procedure: LAPAROSCOPY DIAGNOSTIC (GENERAL);  Diagnostic laparoscopy and lysis of adhesions;  Surgeon: Prince Dowling MD;  Location: [facility] OR     OPTICAL TRACKING SYSTEM CRANIOTOMY, EXCISE TUMOR, COMBINED Left 4/10/2015    Procedure: COMBINED OPTICAL TRACKING SYSTEM CRANIOTOMY, EXCISE TUMOR;  Surgeon: Mirlande Colmenares MD;  Location: [facility] OR     REPAIR GAMEKEEPER'S THUMB Right 12/2/2016    Procedure: REPAIR LIGAMENT ULNAR COLLATERAL THUMB (GAMEKEEPER'S);  Surgeon: Evin Zamorano MD;  Location: [facility] OR       Social History:  Social History     Social History     Marital status: Single     Spouse name: N/A     Number of children: N/A     Years of education: N/A     Occupational History           5 years; temp agency     Social History Main Topics     Smoking status: Current Every Day Smoker     Packs/day: 0.50     Types: Cigarettes     Last attempt to quit: 10/28/2016     Smokeless tobacco: Former User      Comment: 4 cigarettes     Alcohol use No      Comment: Last etoh in 2007     Drug use: No      Comment: \"Not very often\"     Sexual activity: Not Currently     Partners: Female, Male      Comment: Last sexual activity 2008     Other Topics Concern     Not on file     Social History Narrative    Born in [facility].  [place] the [place] in [place].  Last traveled to visit family in 2008.        Patient is homeless, occasionally stays in homeless shelters and on the streets    Family History:  Family History   Problem Relation Age of Onset     Diabetes Brother      Diabetes Father      Alzheimer Disease Father      Unknown/Adopted Mother      Diabetes Paternal Grandfather      Cancer No family hx of      no skin cancer     Skin Cancer No family hx of      no famiy hx of skin cancer     Glaucoma No family hx of      Macular Degeneration No family hx of        Medications:  Current Outpatient Prescriptions   Medication Sig Dispense Refill "     Bictegravir-Emtricitab-Tenofov -25 MG TABS Take 1 tablet by mouth daily 30 tablet 11     blood glucose (NO BRAND SPECIFIED) lancets standard Use to test blood sugar four times daily or as directed. 100 Box 5     blood glucose monitoring (GELACIO CONTOUR NEXT) test strip Use to test blood sugar 3 times daily or as directed.  Ok to substitute alternative if insurance prefers. 100 strip 11     blood glucose monitoring (GELACIO MICROLET) lancets Use to test blood sugar 2 times daily or as directed.  Ok to substitute alternative if insurance prefers. 100 each 11     blood glucose monitoring (NO BRAND SPECIFIED) meter device kit Use to test blood sugar 4 times daily or as directed. 1 kit 0     blood glucose monitoring (NO BRAND SPECIFIED) meter device kit Use to test blood sugar four times daily or as directed. 1 kit 0     blood glucose monitoring (NO BRAND SPECIFIED) test strip 1 strip by In Vitro route 4 times daily (before meals and nightly) Use to test blood sugars 4 times daily or as directed 100 strip 11     blood glucose monitoring (NO BRAND SPECIFIED) test strip Use to test blood sugars four times daily or as directed 100 each 5     cephALEXin (KEFLEX) 500 MG capsule Take 1 capsule (500 mg) by mouth 4 times daily for 7 days 28 capsule 0     empagliflozin (JARDIANCE) 10 MG TABS tablet Take 1 tablet by mouth daily 90 tablet 3     hydrOXYzine (ATARAX) 25 MG tablet Take 1-2 tablets (25-50 mg) by mouth every 6 hours as needed for itching 20 tablet 0     insulin glargine (LANTUS SOLOSTAR) 100 UNIT/ML pen Take 14 units each morning 15 mL 3     insulin pen needle (B-D U/F) 31G X 8 MM Use 1 pen needles daily or as directed. 100 each 0     insulin pen needle (BD SCOTT U/F) 32G X 4 MM Use 1 daily as directed. 100 each 11     omeprazole (PRILOSEC) 20 MG CR capsule Take 1 capsule (20 mg) by mouth daily 30 capsule 1       Allergies   Allergen Reactions     Metformin      Abdominal pain     Milk [Lac Bovis] Hives      Tylenol [Acetaminophen] Itching     Dulaglutide Rash       Review of Systems:  -Skin Establ Pt: The patient denies any new rash, pruritus, or lesions that are symptomatic, changing or bleeding, except as per HPI.  -Constitutional: The patient is feeling generally well.  -GI: no nausea, abdominal pain, vomiting, diarrhea or blood in the stool    Physical exam:  Vitals: There were no vitals taken for this visit.  GEN: This is a well developed, well-nourished male in no acute distress, in a pleasant mood.    SKIN: Sun-exposed skin, which includes the head/face, neck, both arms, digits, and/or nails was examined.   - Numerous erythematous 5-8mm papules on the trunk, legs and arms, some excoriated on the legs.   - Erythema and swelling of the distal right 5th finger consistent with paronychia.   - No other lesions of concern on areas examined.       Impression/Plan:  1. Bug bites with likely allergic reaction - suspect bed bugs     Start permethrin 5% cream - apply cream from head to toe, excluding the face. Leave on for 8-14 hours then wash off with water, then reapply in 1 week.    Continue with hydroxyzine 25-50mg nightly for itching    Keflex will help with prophylaxis of infection (see below)    2. Paronychia - patient was dx and prescribed keflex, but has not started taking this yet    Reminded the importance of taking Kelfex 500 mg he was prescribed due to his HIV status and higher risk for infection.       Follow-up as needed for new or changing lesions.       Staff Involved:    Scribe Disclosure  I, Kevin Birch, am serving as a scribe to document services personally performed by hafsa Garcia PA-C, based on data collection and the provider's statements to me.     Provider Disclosure:   The documentation recorded by the scribe accurately reflects the services I personally performed and the decisions made by me.    All risks, benefits and alternatives were discussed with patient.  Patient is in  agreement and understands the assessment and plan.  All questions were answered.    Annamarie Garcia PA-C  [facility] [place]: Phone: [phone], Fax: [phone]

## 2018-09-06 ENCOUNTER — HOSPITAL ENCOUNTER (EMERGENCY)
Facility: CLINIC | Age: 53
Discharge: HOME | End: 2018-09-06
Attending: EMERGENCY MEDICINE | Admitting: EMERGENCY MEDICINE
Payer: COMMERCIAL

## 2018-09-06 VITALS
BODY MASS INDEX: 29.35 KG/M2 | HEIGHT: 64 IN | TEMPERATURE: 98.7 F | SYSTOLIC BLOOD PRESSURE: 132 MMHG | WEIGHT: 171.9 LBS | RESPIRATION RATE: 18 BRPM | HEART RATE: 82 BPM | OXYGEN SATURATION: 97 % | DIASTOLIC BLOOD PRESSURE: 68 MMHG

## 2018-09-06 DIAGNOSIS — S70.362A: ICD-10-CM
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
## 2018-09-06 DIAGNOSIS — L08.9: ICD-10-CM

## 2018-09-06 PROCEDURE — 99284 EMERGENCY DEPT VISIT MOD MDM: CPT | Mod: Z6 | Performed by: EMERGENCY MEDICINE

## 2018-09-06 PROCEDURE — 99283 EMERGENCY DEPT VISIT LOW MDM: CPT

## 2018-09-06 RX ORDER — DOXYCYCLINE 100 MG/1
100 CAPSULE ORAL 2 TIMES DAILY
Qty: 20 CAPSULE | Refills: 0 | Status: ON HOLD | OUTPATIENT
Start: 2018-09-06 | End: 2018-09-13

## 2018-09-06 RX ORDER — TRAMADOL HYDROCHLORIDE 50 MG/1
50 TABLET ORAL EVERY 6 HOURS PRN
Qty: 10 TABLET | Refills: 0 | Status: ON HOLD | OUTPATIENT
Start: 2018-09-06 | End: 2018-09-13

## 2018-09-06 RX ORDER — HYDROXYZINE HYDROCHLORIDE 25 MG/1
25 TABLET, FILM COATED ORAL 3 TIMES DAILY
Qty: 15 TABLET | Refills: 0 | Status: ON HOLD | OUTPATIENT
Start: 2018-09-06 | End: 2018-09-13

## 2018-09-06 NOTE — ED AVS SNAPSHOT
[facility], Emergency Department    [address] [address]    Phone:  [phone]                                       Andrew Lockwood   MRN: [id number]    Department:  [facility], Emergency Department   Date of Visit:  9/6/2018           Patient Information     Date Of Birth          11/27/1967        Your diagnoses for this visit were:     Insect bite, initial encounter probable bed bugs       You were seen by Pieter Bruno MD.        Discharge Instructions       Please make an appointment to follow up with Your Primary Care Provider or our Primary Care Center (phone: [phone]) in 1 week for recheck.    Contact the [facility] to help get your shelter treated.        Discharge References/Attachments     BEDBUGS (ENGLISH)    INSECT BITE (ENGLISH)      Your next 10 appointments already scheduled     Sep 07, 2018  1:00 PM CDT   (Arrive by 12:45 PM)   Return Visit with Annamarie Garcia PA-C   [facility] Dermatology ([facility] [place])    [address]  [address] [facility] [address] [phone]            Oct 08, 2018  1:00 PM CDT   (Arrive by 12:45 PM)   Return Visit with Rekha Membreno MD   [facility] ([facility] [place])    [address]  4th Floor  [facility] [address] [phone]              24 Hour Appointment Hotline       To make an appointment at any [facility], call [phone] ([phone]). If you don't have a family doctor or clinic, we will help you find one. [facility] are conveniently located to serve the needs of you and your family.             Review of your medicines      START taking        Dose / Directions Last dose taken    doxycycline 100 MG capsule   Commonly known as:  VIBRAMYCIN   Dose:  100 mg   Quantity:  20 capsule        Take 1 capsule (100 mg) by mouth 2 times daily   Refills:  0        hydrOXYzine 25 MG tablet   Commonly known  as:  ATARAX   Dose:  25 mg   Quantity:  15 tablet        Take 1 tablet (25 mg) by mouth 3 times daily for 5 days   Refills:  0        traMADol 50 MG tablet   Commonly known as:  ULTRAM   Dose:  50 mg   Quantity:  10 tablet        Take 1 tablet (50 mg) by mouth every 6 hours as needed for pain   Refills:  0          Our records show that you are taking the medicines listed below. If these are incorrect, please call your family doctor or clinic.        Dose / Directions Last dose taken    Bictegravir-Emtricitab-Tenofov -25 MG Tabs   Dose:  1 tablet   Quantity:  30 tablet        Take 1 tablet by mouth daily   Refills:  11        blood glucose lancets standard   Commonly known as:  no brand specified   Quantity:  100 Box        Use to test blood sugar four times daily or as directed.   Refills:  5        blood glucose monitoring lancets   Quantity:  100 each        Use to test blood sugar 2 times daily or as directed.  Ok to substitute alternative if insurance prefers.   Refills:  11        * blood glucose monitoring meter device kit   Commonly known as:  no brand specified   Quantity:  1 kit        Use to test blood sugar four times daily or as directed.   Refills:  0        * blood glucose monitoring meter device kit   Commonly known as:  no brand specified   Quantity:  1 kit        Use to test blood sugar 4 times daily or as directed.   Refills:  0        * blood glucose monitoring test strip   Commonly known as:  no brand specified   Quantity:  100 each        Use to test blood sugars four times daily or as directed   Refills:  5        * blood glucose monitoring test strip   Commonly known as:  no brand specified   Dose:  1 strip   Quantity:  100 strip        1 strip by In Vitro route 4 times daily (before meals and nightly) Use to test blood sugars 4 times daily or as directed   Refills:  11        * blood glucose monitoring test strip   Commonly known as:  GELACIO CONTOUR NEXT   Quantity:  100 strip         Use to test blood sugar 3 times daily or as directed.  Ok to substitute alternative if insurance prefers.   Refills:  11        empagliflozin 10 MG Tabs tablet   Commonly known as:  JARDIANCE   Quantity:  90 tablet        Take 1 tablet by mouth daily   Refills:  3        * insulin pen needle 31G X 8 MM   Commonly known as:  B-D U/F   Quantity:  100 each        Use 1 pen needles daily or as directed.   Refills:  0        * insulin pen needle 32G X 4 MM   Commonly known as:  BD SCOTT U/F   Quantity:  100 each        Use 1 daily as directed.   Refills:  11        LANTUS SOLOSTAR 100 UNIT/ML injection   Quantity:  15 mL   Generic drug:  insulin glargine        Take 14 units each morning   Refills:  3        omeprazole 20 MG CR capsule   Commonly known as:  priLOSEC   Dose:  20 mg   Quantity:  30 capsule        Take 1 capsule (20 mg) by mouth daily   Refills:  1        permethrin 5 % cream   Commonly known as:  ELIMITE   Quantity:  60 g        Apply cream from head to toe (except the face); leave on for 8-14 hours then wash off with water; reapply in 1 week   Refills:  1        * Notice:  This list has 7 medication(s) that are the same as other medications prescribed for you. Read the directions carefully, and ask your doctor or other care provider to review them with you.            Information about OPIOIDS     PRESCRIPTION OPIOIDS: WHAT YOU NEED TO KNOW   We gave you an opioid (narcotic) pain medicine. It is important to manage your pain, but opioids are not always the best choice. You should first try all the other options your care team gave you. Take this medicine for as short a time (and as few doses) as possible.    Some activities can increase your pain, such as bandage changes or therapy sessions. It may help to take your pain medicine 30 to 60 minutes before these activities. Reduce your stress by getting enough sleep, working on hobbies you enjoy and practicing relaxation or meditation. Talk to your care team  about ways to manage your pain beyond prescription opioids.    These medicines have risks:    DO NOT drive when on new or higher doses of pain medicine. These medicines can affect your alertness and reaction times, and you could be arrested for driving under the influence (DUI). If you need to use opioids long-term, talk to your care team about driving.    DO NOT operate heavy machinery    DO NOT do any other dangerous activities while taking these medicines.    DO NOT drink any alcohol while taking these medicines.     If the opioid prescribed includes acetaminophen, DO NOT take with any other medicines that contain acetaminophen. Read all labels carefully. Look for the word  acetaminophen  or  Tylenol.  Ask your pharmacist if you have questions or are unsure.    You can get addicted to pain medicines, especially if you have a history of addiction (chemical, alcohol or substance dependence). Talk to your care team about ways to reduce this risk.    All opioids tend to cause constipation. Drink plenty of water and eat foods that have a lot of fiber, such as fruits, vegetables, prune juice, apple juice and high-fiber cereal. Take a laxative (Miralax, milk of magnesia, Colace, Senna) if you don t move your bowels at least every other day. Other side effects include upset stomach, sleepiness, dizziness, throwing up, tolerance (needing more of the medicine to have the same effect), physical dependence and slowed breathing.    Store your pills in a secure place, locked if possible. We will not replace any lost or stolen medicine. If you don t finish your medicine, please throw away (dispose) as directed by your pharmacist. The [place] Pollution Control Agency has more information about safe disposal: https://www.pca.[email or web address].mn.[email or web address]/living-green/managing-unwanted-medications        Prescriptions were sent or printed at these locations (3 Prescriptions)                   Other Prescriptions                Printed at  Department/Unit printer (3 of 3)         doxycycline (VIBRAMYCIN) 100 MG capsule               hydrOXYzine (ATARAX) 25 MG tablet               traMADol (ULTRAM) 50 MG tablet                Orders Needing Specimen Collection     None      Pending Results     No orders found from 9/4/2018 to 9/7/2018.            Pending Culture Results     No orders found from 9/4/2018 to 9/7/2018.            Pending Results Instructions     If you had any lab results that were not finalized at the time of your Discharge, you can call the ED Lab Result RN at [phone]. You will be contacted by this team for any positive Lab results or changes in treatment. The nurses are available 7 days a week from 10A to 6:30P.  You can leave a message 24 hours per day and they will return your call.        Thank you for choosing [facility]       Thank you for choosing [facility] for your care. Our goal is always to provide you with excellent care. Hearing back from our patients is one way we can continue to improve our services. Please take a few minutes to complete the written survey that you may receive in the mail after you visit with us. Thank you!        [employer] Information     [facility] gives you secure access to your electronic health record. If you see a primary care provider, you can also send messages to your care team and make appointments. If you have questions, please call your primary care clinic.  If you do not have a primary care provider, please call [phone] and they will assist you.        Care EveryWhere ID     This is your Care EveryWhere ID. This could be used by other organizations to access your [facility] medical records  [id number]        Equal Access to Services     RA JORDAN : Hadii carissa Cody, waaxda luvirginiaadaha, qaybta kaalmada dez reyna . So [facility] [phone].    ATENCIÓN: Si habla español, tiene a kohli disposición servicios gratuitos de asistencia lingüística.  Gurdeep davila [phone].    We comply with applicable federal civil rights laws and [place] laws. We do not discriminate on the basis of race, color, national origin, age, disability, sex, sexual orientation, or gender identity.            After Visit Summary       This is your record. Keep this with you and show to your community pharmacist(s) and doctor(s) at your next visit.

## 2018-09-06 NOTE — ED AVS SNAPSHOT
[facility], [place], Emergency Department    [address] [place] [address]    Phone:  [phone]                                       Andrew Lockwood   MRN: [id number]    Department:  [facility], Emergency Department   Date of Visit:  9/6/2018           After Visit Summary Signature Page     I have received my discharge instructions, and my questions have been answered. I have discussed any challenges I see with this plan with the nurse or doctor.    ..........................................................................................................................................  Patient/Patient Representative Signature      ..........................................................................................................................................  Patient Representative Print Name and Relationship to Patient    ..................................................               ................................................  Date                                            Time    ..........................................................................................................................................  Reviewed by Signature/Title    ...................................................              ..............................................  Date                                                            Time          22EPIC Rev 08/18

## 2018-09-06 NOTE — ED TRIAGE NOTES
Pt comes in with c/o possible bed bug bite site infections. He has a bump/bite on his left leg that is painful and warm to the touch. He has multiple sites on his arms, legs that he says are from bed bugs.

## 2018-09-06 NOTE — ED PROVIDER NOTES
History     Chief Complaint   Patient presents with     Insect Bite     bed bug bites     HPI  Andrew Lockwood is a 50 year old diabetic male who states he has been the victim of bedbug bites in the last 2 weeks.  Patient states he was seen in the dermatology clinic and given some Elimite to use.  Patient states that his lesions started to dry up but he went back to his shelter last night and woke up this morning with another bite on his left leg.  Patient states that shelter has not been treated and that they do not intend to treat the shelter.  Patient denies any fevers denies any shortness of breath.    Patient states his blood sugars been running fine today    I have reviewed the Medications, Allergies, Past Medical and Surgical History, and Social History in the jiffstore system.    Past Medical History:   Diagnosis Date     AIDS (H)      Allergic rhinitis due to other allergen     DNS     Chronic abdominal pain      CNS toxoplasmosis (H)      Diabetes type 2, controlled (H)      GERD (gastroesophageal reflux disease)      HIV (human immunodeficiency virus infection) (H)      Periungual wart      Sleep apnea     doesn't use CPAP     Previous Medications    BICTEGRAVIR-EMTRICITAB-TENOFOV -25 MG TABS    Take 1 tablet by mouth daily    BLOOD GLUCOSE (NO BRAND SPECIFIED) LANCETS STANDARD    Use to test blood sugar four times daily or as directed.    BLOOD GLUCOSE MONITORING (GELACIO CONTOUR NEXT) TEST STRIP    Use to test blood sugar 3 times daily or as directed.  Ok to substitute alternative if insurance prefers.    BLOOD GLUCOSE MONITORING (GELACIO MICROLET) LANCETS    Use to test blood sugar 2 times daily or as directed.  Ok to substitute alternative if insurance prefers.    BLOOD GLUCOSE MONITORING (NO BRAND SPECIFIED) METER DEVICE KIT    Use to test blood sugar four times daily or as directed.    BLOOD GLUCOSE MONITORING (NO BRAND SPECIFIED) METER DEVICE KIT    Use to test blood sugar 4 times daily or as  "directed.    BLOOD GLUCOSE MONITORING (NO BRAND SPECIFIED) TEST STRIP    Use to test blood sugars four times daily or as directed    BLOOD GLUCOSE MONITORING (NO BRAND SPECIFIED) TEST STRIP    1 strip by In Vitro route 4 times daily (before meals and nightly) Use to test blood sugars 4 times daily or as directed    EMPAGLIFLOZIN (JARDIANCE) 10 MG TABS TABLET    Take 1 tablet by mouth daily    INSULIN GLARGINE (LANTUS SOLOSTAR) 100 UNIT/ML PEN    Take 14 units each morning    INSULIN PEN NEEDLE (B-D U/F) 31G X 8 MM    Use 1 pen needles daily or as directed.    INSULIN PEN NEEDLE (BD SCOTT U/F) 32G X 4 MM    Use 1 daily as directed.    OMEPRAZOLE (PRILOSEC) 20 MG CR CAPSULE    Take 1 capsule (20 mg) by mouth daily    PERMETHRIN (ELIMITE) 5 % CREAM    Apply cream from head to toe (except the face); leave on for 8-14 hours then wash off with water; reapply in 1 week       Allergies   Allergen Reactions     Metformin      Abdominal pain     Milk [Lac Bovis] Hives     Tylenol [Acetaminophen] Itching     Dulaglutide Rash     Social History     Social History     Marital status: Single     Spouse name: N/A     Number of children: N/A     Years of education: N/A     Occupational History           5 years; temp agency     Social History Main Topics     Smoking status: Current Every Day Smoker     Packs/day: 0.50     Types: Cigarettes     Last attempt to quit: 10/28/2016     Smokeless tobacco: Former User      Comment: quit Aug 2018     Alcohol use No      Comment: Last etoh in 2007     Drug use: No      Comment: \"Not very often\"     Sexual activity: Not Currently     Partners: Female, Male      Comment: Last sexual activity 2008     Other Topics Concern     Not on file     Social History Narrative    Born in [facility].  [place] the [place] in [place].  Last traveled to visit family in 2008.          Past Surgical History:   Procedure Laterality Date     C NONSPECIFIC PROCEDURE      right forearm fracture     " "COLONOSCOPY Left 1/22/2016    Procedure: COMBINED COLONOSCOPY, SINGLE OR MULTIPLE BIOPSY/POLYPECTOMY BY BIOPSY;  Surgeon: Clark Saini MD;  Location: [facility]     HC EXPLORE UNDESC TESTIS,INGUIN/SCROTAL       LAPAROSCOPIC APPENDECTOMY N/A 1/31/2018    Procedure: LAPAROSCOPIC APPENDECTOMY;  LAPAROSCOPIC APPENDECTOMY;  Surgeon: Dawn Holt MD;  Location: [facility]     LAPAROSCOPY DIAGNOSTIC (GENERAL) N/A 7/26/2016    Procedure: LAPAROSCOPY DIAGNOSTIC (GENERAL);  Surgeon: Susannah Arriaga MD;  Location: [facility]     LAPAROSCOPY DIAGNOSTIC (GENERAL) N/A 4/16/2018    Procedure: LAPAROSCOPY DIAGNOSTIC (GENERAL);  Diagnostic laparoscopy and lysis of adhesions;  Surgeon: Prince Dowling MD;  Location: [facility]     OPTICAL TRACKING SYSTEM CRANIOTOMY, EXCISE TUMOR, COMBINED Left 4/10/2015    Procedure: COMBINED OPTICAL TRACKING SYSTEM CRANIOTOMY, EXCISE TUMOR;  Surgeon: Mirlande Colmenares MD;  Location: [facility]     REPAIR GAMEKEEPER'S THUMB Right 12/2/2016    Procedure: REPAIR LIGAMENT ULNAR COLLATERAL THUMB (GAMEKEEPER'S);  Surgeon: Evin Zamorano MD;  [facility]     Family History   Problem Relation Age of Onset     Diabetes Brother      Diabetes Father      Alzheimer Disease Father      Unknown/Adopted Mother      Diabetes Paternal Grandfather      Cancer No family hx of      no skin cancer     Skin Cancer No family hx of      no famiy hx of skin cancer     Glaucoma No family hx of      Macular Degeneration No family hx of        Review of Systems   All other systems reviewed and are negative.      Physical Exam   BP: 127/86  Pulse: 98  Temp: 98.7  F (37.1  C)  Resp: 18  Height: 162.6 cm (5' 4\")  Weight: 78 kg (171 lb 14.4 oz)  SpO2: 97 %      Physical Exam   Constitutional: He is oriented to person, place, and time.   Alert conversant pleasant   HENT:   Head: Atraumatic.   Eyes: EOM are normal. Pupils are equal, round, and reactive to light.   Neck: Neck supple.   Pulmonary/Chest: No " respiratory distress.   Musculoskeletal: He exhibits no deformity.   Neurological: He is alert and oriented to person, place, and time.   Grossly intact and symmetric   Skin:   There is a new insect bite to the patient's lateral left thigh.    There are multiple old healing bites or lesions to his legs bilaterally.   Psychiatric: He has a normal mood and affect.       ED Course     ED Course     Procedures            Assessments & Plan (with Medical Decision Making)     I have reviewed the nursing notes.    I have reviewed the findings, diagnosis, plan and need for follow up with the patient.    New Prescriptions    DOXYCYCLINE (VIBRAMYCIN) 100 MG CAPSULE    Take 1 capsule (100 mg) by mouth 2 times daily    HYDROXYZINE (ATARAX) 25 MG TABLET    Take 1 tablet (25 mg) by mouth 3 times daily for 5 days    TRAMADOL (ULTRAM) 50 MG TABLET    Take 1 tablet (50 mg) by mouth every 6 hours as needed for pain       Final diagnoses:   Insect bite, initial encounter - probable bed bugs     Please make an appointment to follow up with Your Primary Care Provider or our Primary Care Center (phone: [phone]) in 1 week for recheck.    Contact the [facility] department to help get your shelter treated.    Pieter Bruno MD    9/6/2018   [facility], EMERGENCY DEPARTMENT     Pieter Bruno MD  09/06/18 [id number]

## 2018-09-06 NOTE — DISCHARGE INSTRUCTIONS
Please make an appointment to follow up with Your Primary Care Provider or our Primary Care Center (phone: [phone]) in 1 week for recheck.    Contact the [facility] to help get your shelter treated.

## 2018-09-07 ENCOUNTER — OFFICE VISIT (OUTPATIENT)
Dept: DERMATOLOGY | Facility: CLINIC | Age: 53
End: 2018-09-07
Payer: COMMERCIAL
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
## 2018-09-07 DIAGNOSIS — L73.9 FOLLICULITIS: ICD-10-CM

## 2018-09-07 DIAGNOSIS — L73.9 FOLLICULITIS: Primary | ICD-10-CM

## 2018-09-07 RX ORDER — TRIAMCINOLONE ACETONIDE 0.25 MG/G
CREAM TOPICAL 2 TIMES DAILY
Qty: 15 G | Refills: 0 | Status: SHIPPED | OUTPATIENT
Start: 2018-09-07 | End: 2018-10-05

## 2018-09-07 ASSESSMENT — PAIN SCALES - GENERAL: PAINLEVEL: EXTREME PAIN (9)

## 2018-09-07 NOTE — NURSING NOTE
Dermatology Rooming Note    Andrew Lockwood's goals for this visit include:   Chief Complaint   Patient presents with     Derm Problem     Andrew is here today to be seen for bites on his skin- one area that is painful.      Emmanuelle Perez MA

## 2018-09-07 NOTE — PROGRESS NOTES
[facility] Dermatology Note      Dermatology Problem List:  1. 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 Folliculitis -doxycycline 100mg po BID x 10 days,  triamcinolone 0.025% cream, hydroxyzine 25mg TID  3. Prurigo nodularis   4. Periungual wart   5. Paronychia   -s/p keflex  6. Bug bites with allergic reaction   -s/p permethrin 5% cream, hydroxyzine 25-50mg - resolved  7. HIV +     Encounter Date: Sep 7, 2018    CC:  Chief Complaint   Patient presents with     Derm Problem     Andrew is here today to be seen for bites on his skin- one area that is painful.          History of Present Illness:  Mr. Andrew Lockwood is a 50 year old male who presents today for evaluation of itchy lesions on his arms and legs. The patient was last seen on 8/31/18, at which time he started permethrin 5% cream for bug bites with allergic reaction. He says overall his skin is doing much better and the rash he had last week is basically gone.  At today's visit, he reports a new bite on his left knee that appears inflamed and irritated. He says this radiates pain up his thigh. He was seen by the ED yesterday where he was prescribed doxycycline 100mg, hydroxyzine 25mg, and tramadol 50mg. The other bites seem to be improving well. He finished his course of Keflex as prescribed at the last visit. He is currently living in the [facility] in [place]. The patient is otherwise feeling well. There are no other skin concerns at this time.      Past Medical History:   Patient Active Problem List   Diagnosis     Allergic rhinitis due to other allergen     Brain lesion     Toxoplasma encephalitis (H)     Pulmonary nodules     Human immunodeficiency virus I infection (H)     Folliculitis     Prurigo nodularis     Epidermal cyst     Shoulder joint pain, unspecified laterality     CNS toxoplasmosis (H)     Constipation     Non-intractable vomiting with nausea, vomiting of unspecified type     Thrush     Insomnia,  unspecified insomnia     Bowel obstruction     Toxoplasmosis     Gastroesophageal reflux disease     Headache     Aphthous ulcer     Type 2 diabetes mellitus (H)     Small bowel obstruction     SBO (small bowel obstruction)     Slow transit constipation     Periungual wart     Herpes zoster     Erectile dysfunction, unspecified erectile dysfunction type     Insomnia, unspecified type     Preventative health care     Pain of right thumb     Rupture of ulnar collateral ligament of right thumb     Aftercare following surgery of the musculoskeletal system     Panic disorder     Generalized anxiety disorder     Major depressive disorder, single episode, unspecified     Abdominal pain     Acute appendicitis with localized peritonitis     S/P appendectomy     Abdominal abscess (H)     Appendicitis     Otitis media     Horseshoe tear of retina of left eye without detachment     Past Medical History:   Diagnosis Date     AIDS (H)      Allergic rhinitis due to other allergen     DNS     Chronic abdominal pain      CNS toxoplasmosis (H)      Diabetes type 2, controlled (H)      GERD (gastroesophageal reflux disease)      HIV (human immunodeficiency virus infection) (H)      Periungual wart      Sleep apnea     doesn't use CPAP     Past Surgical History:   Procedure Laterality Date     C NONSPECIFIC PROCEDURE      right forearm fracture     COLONOSCOPY Left 1/22/2016    Procedure: COMBINED COLONOSCOPY, SINGLE OR MULTIPLE BIOPSY/POLYPECTOMY BY BIOPSY;  Surgeon: Clark Saini MD;  Location: [facility]     HC EXPLORE UNDESC TESTIS,INGUIN/SCROTAL       LAPAROSCOPIC APPENDECTOMY N/A 1/31/2018    Procedure: LAPAROSCOPIC APPENDECTOMY;  LAPAROSCOPIC APPENDECTOMY;  Surgeon: Dawn Holt MD;  [facility]     LAPAROSCOPY DIAGNOSTIC (GENERAL) N/A 7/26/2016    Procedure: LAPAROSCOPY DIAGNOSTIC (GENERAL);  Surgeon: Susannah Arriaga MD;  [facility]     LAPAROSCOPY DIAGNOSTIC (GENERAL) N/A 4/16/2018     "Procedure: LAPAROSCOPY DIAGNOSTIC (GENERAL);  Diagnostic laparoscopy and lysis of adhesions;  Surgeon: Prince Dowling MD;  [facility]     OPTICAL TRACKING SYSTEM CRANIOTOMY, EXCISE TUMOR, COMBINED Left 4/10/2015    Procedure: COMBINED OPTICAL TRACKING SYSTEM CRANIOTOMY, EXCISE TUMOR;  Surgeon: Mirlande Colmenares MD;  [facility]     REPAIR GAMEKEEPER'S THUMB Right 12/2/2016    Procedure: REPAIR LIGAMENT ULNAR COLLATERAL THUMB (GAMEKEEPER'S);  Surgeon: Evin Zamorano MD;  [facility]       Social History:  Social History     Social History     Marital status: Single     Spouse name: N/A     Number of children: N/A     Years of education: N/A     Occupational History           5 years; temp agency     Social History Main Topics     Smoking status: Current Every Day Smoker     Packs/day: 0.50     Types: Cigarettes     Last attempt to quit: 10/28/2016     Smokeless tobacco: Former User      Comment: 4 cigarettes     Alcohol use No      Comment: Last etoh in 2007     Drug use: No      Comment: \"Not very often\"     Sexual activity: Not Currently     Partners: Female, Male      Comment: Last sexual activity 2008     Other Topics Concern     Not on file     Social History Narrative    Born in [facility].  [place] the [place].  Last traveled to visit family in 2008.        Patient is homeless, currently living in tent community in [place].     Family History:  Family History   Problem Relation Age of Onset     Diabetes Brother      Diabetes Father      Alzheimer Disease Father      Unknown/Adopted Mother      Diabetes Paternal Grandfather      Cancer No family hx of      no skin cancer     Skin Cancer No family hx of      no famiy hx of skin cancer     Glaucoma No family hx of      Macular Degeneration No family hx of        Medications:  Current Outpatient Prescriptions   Medication Sig Dispense Refill     Bictegravir-Emtricitab-Tenofov -25 MG TABS Take 1 tablet by " mouth daily 30 tablet 11     blood glucose (NO BRAND SPECIFIED) lancets standard Use to test blood sugar four times daily or as directed. 100 Box 5     blood glucose monitoring (GELACIO CONTOUR NEXT) test strip Use to test blood sugar 3 times daily or as directed.  Ok to substitute alternative if insurance prefers. 100 strip 11     blood glucose monitoring (GELACIO MICROLET) lancets Use to test blood sugar 2 times daily or as directed.  Ok to substitute alternative if insurance prefers. 100 each 11     blood glucose monitoring (NO BRAND SPECIFIED) meter device kit Use to test blood sugar 4 times daily or as directed. 1 kit 0     blood glucose monitoring (NO BRAND SPECIFIED) meter device kit Use to test blood sugar four times daily or as directed. 1 kit 0     blood glucose monitoring (NO BRAND SPECIFIED) test strip 1 strip by In Vitro route 4 times daily (before meals and nightly) Use to test blood sugars 4 times daily or as directed 100 strip 11     blood glucose monitoring (NO BRAND SPECIFIED) test strip Use to test blood sugars four times daily or as directed 100 each 5     doxycycline (VIBRAMYCIN) 100 MG capsule Take 1 capsule (100 mg) by mouth 2 times daily 20 capsule 0     empagliflozin (JARDIANCE) 10 MG TABS tablet Take 1 tablet by mouth daily 90 tablet 3     hydrOXYzine (ATARAX) 25 MG tablet Take 1 tablet (25 mg) by mouth 3 times daily for 5 days 15 tablet 0     insulin glargine (LANTUS SOLOSTAR) 100 UNIT/ML pen Take 14 units each morning 15 mL 3     insulin pen needle (B-D U/F) 31G X 8 MM Use 1 pen needles daily or as directed. 100 each 0     insulin pen needle (BD SCOTT U/F) 32G X 4 MM Use 1 daily as directed. 100 each 11     omeprazole (PRILOSEC) 20 MG CR capsule Take 1 capsule (20 mg) by mouth daily 30 capsule 1     permethrin (ELIMITE) 5 % cream Apply cream from head to toe (except the face); leave on for 8-14 hours then wash off with water; reapply in 1 week 60 g 1     traMADol (ULTRAM) 50 MG tablet Take 1  tablet (50 mg) by mouth every 6 hours as needed for pain 10 tablet 0       Allergies   Allergen Reactions     Metformin      Abdominal pain     Milk [Lac Bovis] Hives     Tylenol [Acetaminophen] Itching     Dulaglutide Rash       Review of Systems:  -Skin Establ Pt: The patient denies any new rash, pruritus, or lesions that are symptomatic, changing or bleeding, except as per HPI.  -Constitutional: The patient is feeling generally well.  -GI: no nausea, abdominal pain, vomiting, diarrhea or blood in the stool    Physical exam:  Vitals: There were no vitals taken for this visit.  GEN: This is a well developed, well-nourished male in no acute distress, in a pleasant mood.    SKIN: Sun-exposed skin, which includes the head/face, neck, both arms, digits, and/or nails was examined.   - resolving crusted papules on arms and legs.   - Erythematous papule with overlying hemorrhagic crust on the left lateral knee with surrounding erythema and some mild edema.   - No other lesions of concern on areas examined.       Impression/Plan:  1. Bug bites with likely allergic reaction - suspect bed bugs     Resolved.     2.   Folliculitis with likely infection     Patient was seen last seen by ED yesterday where he was prescribed doxycycline 100mg - may continue this for next 9 days as well as the hydroxyzine 25mg po TID.     Start triamcinolone 0.025% cream - apply topically BID.     Follow-up as needed for new or changing lesions.       Staff Involved:    Scribe Disclosure  I, Kevin Birch, am serving as a scribe to document services personally performed by hafsa Garcia PA-C, based on data collection and the provider's statements to me.     Provider Disclosure:   The documentation recorded by the scribe accurately reflects the services I personally performed and the decisions made by me.    All risks, benefits and alternatives were discussed with patient.  Patient is in agreement and understands the assessment and  plan.  All questions were answered.    Annamarie Garcia PA-C  [facility]: Phone: [phone], Fax: [phone]

## 2018-09-07 NOTE — MR AVS SNAPSHOT
After Visit Summary   9/7/2018    Andrew Lockwood    MRN: [id number]           Patient Information     Date Of Birth          11/27/1967        Visit Information        Provider Department      9/7/2018 1:00 PM Annamarie Garcia PA-C [facility]        Today's Diagnoses     Bug bite, initial encounter    -  1    Folliculitis           Follow-ups after your visit        Follow-up notes from your care team     Return if symptoms worsen or fail to improve.      Your next 10 appointments already scheduled     Oct 08, 2018  1:00 PM CDT   (Arrive by 12:45 PM)   Return Visit with Rekha Membreno MD   [facility] ([facility])    [address]  [address] [facility] [address] [phone]              Who to contact     Please call your clinic at [phone] to:    Ask questions about your health    Make or cancel appointments    Discuss your medicines    Learn about your test results    Speak to your doctor            Additional Information About Your Visit        MyChart Information     [employer] gives you secure access to your electronic health record. If you see a primary care provider, you can also send messages to your care team and make appointments. If you have questions, please call your primary care clinic.  If you do not have a primary care provider, please call [phone] and they will assist you.      [employer] is an electronic gateway that provides easy, online access to your medical records. With [employer], you can request a clinic appointment, read your test results, renew a prescription or communicate with your care team.     To access your existing account, please contact your [facility] or call [phone] for assistance.        Care EveryWhere ID     This is your Care EveryWhere ID. This could be used by other organizations to access your [facility] medical records  [id number]         Blood Pressure  from Last 3 Encounters:   09/06/18 132/68   08/30/18 (!) 119/92   08/24/18 123/81    Weight from Last 3 Encounters:   09/06/18 78 kg (171 lb 14.4 oz)   08/30/18 79.8 kg (176 lb)   08/24/18 77.1 kg (170 lb)              Today, you had the following     No orders found for display         Today's Medication Changes          These changes are accurate as of 9/7/18  1:35 PM.  If you have any questions, ask your nurse or doctor.               Start taking these medicines.        Dose/Directions    triamcinolone 0.025 % cream   Commonly known as:  KENALOG   Used for:  Folliculitis   Started by:  Annamarie Garcia PA-C        Apply topically 2 times daily   Quantity:  15 g   Refills:  0            Where to get your medicines      These medications were sent to [address] [place] [address] [address] [place] [id number]    Hours:  TRANSPLANT PHONE NUMBER [phone] Phone:  [phone]     triamcinolone 0.025 % cream                Primary Care Provider Fax #    Physician No Ref-Primary [phone]       No address on file        Equal Access to Services     RA JORDAN : Apollo Cody, warenettada taryn, qaemeritata kaalmada axel, dez salazar. So [facility] [phone].    ATENCIÓN: Si habla español, tiene a kohli disposición servicios gratuitos de asistencia lingüística. Llame al [phone].    We comply with applicable federal civil rights laws and [place] laws. We do not discriminate on the basis of race, color, national origin, age, disability, sex, sexual orientation, or gender identity.            Thank you!     Thank you for choosing [facility] DERMATOLOGY  for your care. Our goal is always to provide you with excellent care. Hearing back from our patients is one way we can continue to improve our services. Please take a few minutes to complete the written survey that you may receive in the mail after  your visit with us. Thank you!             Your Updated Medication List - Protect others around you: Learn how to safely use, store and throw away your medicines at www.disposemymeds.org.          This list is accurate as of 9/7/18  1:35 PM.  Always use your most recent med list.                   Brand Name Dispense Instructions for use Diagnosis    Bictegravir-Emtricitab-Tenofov -25 MG Tabs     30 tablet    Take 1 tablet by mouth daily    HIV (human immunodeficiency virus infection) (H)       blood glucose lancets standard    no brand specified    100 Box    Use to test blood sugar four times daily or as directed.    Type 2 diabetes mellitus without complication, without long-term current use of insulin (H)       blood glucose monitoring lancets     100 each    Use to test blood sugar 2 times daily or as directed.  Ok to substitute alternative if insurance prefers.    Type 2 diabetes mellitus with hyperglycemia, without long-term current use of insulin (H)       * blood glucose monitoring meter device kit    no brand specified    1 kit    Use to test blood sugar four times daily or as directed.    Type 2 diabetes mellitus without complication, without long-term current use of insulin (H)       * blood glucose monitoring meter device kit    no brand specified    1 kit    Use to test blood sugar 4 times daily or as directed.    Type 2 diabetes mellitus with complication, without long-term current use of insulin (H)       * blood glucose monitoring test strip    no brand specified    100 each    Use to test blood sugars four times daily or as directed    Type 2 diabetes mellitus without complication, without long-term current use of insulin (H)       * blood glucose monitoring test strip    no brand specified    100 strip    1 strip by In Vitro route 4 times daily (before meals and nightly) Use to test blood sugars 4 times daily or as directed    Type 2 diabetes mellitus with complication, without long-term  current use of insulin (H)       * blood glucose monitoring test strip    GELACIO CONTOUR NEXT    100 strip    Use to test blood sugar 3 times daily or as directed.  Ok to substitute alternative if insurance prefers.    Type 2 diabetes mellitus with hyperglycemia, without long-term current use of insulin (H)       doxycycline 100 MG capsule    VIBRAMYCIN    20 capsule    Take 1 capsule (100 mg) by mouth 2 times daily        empagliflozin 10 MG Tabs tablet    JARDIANCE    90 tablet    Take 1 tablet by mouth daily    Diabetes mellitus, type 2 (H)       hydrOXYzine 25 MG tablet    ATARAX    15 tablet    Take 1 tablet (25 mg) by mouth 3 times daily for 5 days        * insulin pen needle 31G X 8 MM    B-D U/F    100 each    Use 1 pen needles daily or as directed.    Type 2 diabetes mellitus without complication, without long-term current use of insulin (H)       * insulin pen needle 32G X 4 MM    BD SCOTT U/F    100 each    Use 1 daily as directed.    Type 2 diabetes mellitus with hyperglycemia, without long-term current use of insulin (H)       LANTUS SOLOSTAR 100 UNIT/ML injection   Generic drug:  insulin glargine     15 mL    Take 14 units each morning    Type 2 diabetes mellitus with hyperglycemia, without long-term current use of insulin (H)       omeprazole 20 MG CR capsule    priLOSEC    30 capsule    Take 1 capsule (20 mg) by mouth daily        permethrin 5 % cream    ELIMITE    60 g    Apply cream from head to toe (except the face); leave on for 8-14 hours then wash off with water; reapply in 1 week    Bug bite, initial encounter       traMADol 50 MG tablet    ULTRAM    10 tablet    Take 1 tablet (50 mg) by mouth every 6 hours as needed for pain        triamcinolone 0.025 % cream    KENALOG    15 g    Apply topically 2 times daily    Folliculitis       * Notice:  This list has 7 medication(s) that are the same as other medications prescribed for you. Read the directions carefully, and ask your doctor or other care  provider to review them with you.

## 2018-09-07 NOTE — LETTER
9/7/2018       RE: Andrew Lockwood  [address]  [facility] [address]     Dear Colleague,    Thank you for referring your patient, Andrew Lockwood, to the [facility] DERMATOLOGY at [facility]. Please see a copy of my visit note below.    [facility] Dermatology Note      Dermatology Problem List:  1. 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 Folliculitis -doxycycline 100mg po BID x 10 days,  triamcinolone 0.025% cream, hydroxyzine 25mg TID  3. Prurigo nodularis   4. Periungual wart   5. Paronychia   -s/p keflex  6. Bug bites with allergic reaction   -s/p permethrin 5% cream, hydroxyzine 25-50mg - resolved  7. HIV +     Encounter Date: Sep 7, 2018    CC:  Chief Complaint   Patient presents with     Derm Problem     Andrew is here today to be seen for bites on his skin- one area that is painful.          History of Present Illness:  Mr. Andrew Lockwood is a 50 year old male who presents today for evaluation of itchy lesions on his arms and legs. The patient was last seen on 8/31/18, at which time he started permethrin 5% cream for bug bites with allergic reaction. He says overall his skin is doing much better and the rash he had last week is basically gone.  At today's visit, he reports a new bite on his left knee that appears inflamed and irritated. He says this radiates pain up his thigh. He was seen by the ED yesterday where he was prescribed doxycycline 100mg, hydroxyzine 25mg, and tramadol 50mg. The other bites seem to be improving well. He finished his course of Keflex as prescribed at the last visit. He is currently living in the [facility] in [place]. The patient is otherwise feeling well. There are no other skin concerns at this time.      Past Medical History:   Patient Active Problem List   Diagnosis     Allergic rhinitis due to other allergen     Brain lesion     Toxoplasma encephalitis (H)     Pulmonary nodules     Human  immunodeficiency virus I infection (H)     Folliculitis     Prurigo nodularis     Epidermal cyst     Shoulder joint pain, unspecified laterality     CNS toxoplasmosis (H)     Constipation     Non-intractable vomiting with nausea, vomiting of unspecified type     Thrush     Insomnia, unspecified insomnia     Bowel obstruction     Toxoplasmosis     Gastroesophageal reflux disease     Headache     Aphthous ulcer     Type 2 diabetes mellitus (H)     Small bowel obstruction     SBO (small bowel obstruction)     Slow transit constipation     Periungual wart     Herpes zoster     Erectile dysfunction, unspecified erectile dysfunction type     Insomnia, unspecified type     Preventative health care     Pain of right thumb     Rupture of ulnar collateral ligament of right thumb     Aftercare following surgery of the musculoskeletal system     Panic disorder     Generalized anxiety disorder     Major depressive disorder, single episode, unspecified     Abdominal pain     Acute appendicitis with localized peritonitis     S/P appendectomy     Abdominal abscess (H)     Appendicitis     Otitis media     Horseshoe tear of retina of left eye without detachment     Past Medical History:   Diagnosis Date     AIDS (H)      Allergic rhinitis due to other allergen     DNS     Chronic abdominal pain      CNS toxoplasmosis (H)      Diabetes type 2, controlled (H)      GERD (gastroesophageal reflux disease)      HIV (human immunodeficiency virus infection) (H)      Periungual wart      Sleep apnea     doesn't use CPAP     Past Surgical History:   Procedure Laterality Date     C NONSPECIFIC PROCEDURE      right forearm fracture     COLONOSCOPY Left 1/22/2016    Procedure: COMBINED COLONOSCOPY, SINGLE OR MULTIPLE BIOPSY/POLYPECTOMY BY BIOPSY;  Surgeon: Clark Saini MD;  Location: [facility] GI     HC EXPLORE UNDESC TESTIS,INGUIN/SCROTAL       LAPAROSCOPIC APPENDECTOMY N/A 1/31/2018    Procedure: LAPAROSCOPIC APPENDECTOMY;  LAPAROSCOPIC  "APPENDECTOMY;  Surgeon: Dawn Holt MD;  Location: [facility] OR     LAPAROSCOPY DIAGNOSTIC (GENERAL) N/A 7/26/2016    Procedure: LAPAROSCOPY DIAGNOSTIC (GENERAL);  Surgeon: Susannah Arriaga MD;  Location: [facility] OR     LAPAROSCOPY DIAGNOSTIC (GENERAL) N/A 4/16/2018    Procedure: LAPAROSCOPY DIAGNOSTIC (GENERAL);  Diagnostic laparoscopy and lysis of adhesions;  Surgeon: Prince Dowling MD;  Location: [facility] OR     OPTICAL TRACKING SYSTEM CRANIOTOMY, EXCISE TUMOR, COMBINED Left 4/10/2015    Procedure: COMBINED OPTICAL TRACKING SYSTEM CRANIOTOMY, EXCISE TUMOR;  Surgeon: Mirlande Colmenares MD;  Location: [facility] OR     REPAIR GAMEKEEPER'S THUMB Right 12/2/2016    Procedure: REPAIR LIGAMENT ULNAR COLLATERAL THUMB (GAMEKEEPER'S);  Surgeon: Evin Zamorano MD;  Location: [facility] OR       Social History:  Social History     Social History     Marital status: Single     Spouse name: N/A     Number of children: N/A     Years of education: N/A     Occupational History           5 years; temp agency     Social History Main Topics     Smoking status: Current Every Day Smoker     Packs/day: 0.50     Types: Cigarettes     Last attempt to quit: 10/28/2016     Smokeless tobacco: Former User      Comment: 4 cigarettes     Alcohol use No      Comment: Last etoh in 2007     Drug use: No      Comment: \"Not very often\"     Sexual activity: Not Currently     Partners: Female, Male      Comment: Last sexual activity 2008     Other Topics Concern     Not on file     Social History Narrative    Born in [facility] [place].  [place]ame to the [place] in 1993.  Last traveled to visit family in 2008.        Patient is homeless, currently living in [facility] community in [place].     Family History:  Family History   Problem Relation Age of Onset     Diabetes Brother      Diabetes Father      Alzheimer Disease Father      Unknown/Adopted Mother      Diabetes Paternal Grandfather      Cancer No family hx of      no skin cancer     Skin " Cancer No family hx of      no famiy hx of skin cancer     Glaucoma No family hx of      Macular Degeneration No family hx of        Medications:  Current Outpatient Prescriptions   Medication Sig Dispense Refill     Bictegravir-Emtricitab-Tenofov -25 MG TABS Take 1 tablet by mouth daily 30 tablet 11     blood glucose (NO BRAND SPECIFIED) lancets standard Use to test blood sugar four times daily or as directed. 100 Box 5     blood glucose monitoring (GELACIO CONTOUR NEXT) test strip Use to test blood sugar 3 times daily or as directed.  Ok to substitute alternative if insurance prefers. 100 strip 11     blood glucose monitoring (GELACIO MICROLET) lancets Use to test blood sugar 2 times daily or as directed.  Ok to substitute alternative if insurance prefers. 100 each 11     blood glucose monitoring (NO BRAND SPECIFIED) meter device kit Use to test blood sugar 4 times daily or as directed. 1 kit 0     blood glucose monitoring (NO BRAND SPECIFIED) meter device kit Use to test blood sugar four times daily or as directed. 1 kit 0     blood glucose monitoring (NO BRAND SPECIFIED) test strip 1 strip by In Vitro route 4 times daily (before meals and nightly) Use to test blood sugars 4 times daily or as directed 100 strip 11     blood glucose monitoring (NO BRAND SPECIFIED) test strip Use to test blood sugars four times daily or as directed 100 each 5     doxycycline (VIBRAMYCIN) 100 MG capsule Take 1 capsule (100 mg) by mouth 2 times daily 20 capsule 0     empagliflozin (JARDIANCE) 10 MG TABS tablet Take 1 tablet by mouth daily 90 tablet 3     hydrOXYzine (ATARAX) 25 MG tablet Take 1 tablet (25 mg) by mouth 3 times daily for 5 days 15 tablet 0     insulin glargine (LANTUS SOLOSTAR) 100 UNIT/ML pen Take 14 units each morning 15 mL 3     insulin pen needle (B-D U/F) 31G X 8 MM Use 1 pen needles daily or as directed. 100 each 0     insulin pen needle (BD SCOTT U/F) 32G X 4 MM Use 1 daily as directed. 100 each 11      omeprazole (PRILOSEC) 20 MG CR capsule Take 1 capsule (20 mg) by mouth daily 30 capsule 1     permethrin (ELIMITE) 5 % cream Apply cream from head to toe (except the face); leave on for 8-14 hours then wash off with water; reapply in 1 week 60 g 1     traMADol (ULTRAM) 50 MG tablet Take 1 tablet (50 mg) by mouth every 6 hours as needed for pain 10 tablet 0       Allergies   Allergen Reactions     Metformin      Abdominal pain     Milk [Lac Bovis] Hives     Tylenol [Acetaminophen] Itching     Dulaglutide Rash     Physical exam:  Vitals: There were no vitals taken for this visit.  GEN: This is a well developed, well-nourished male in no acute distress, in a pleasant mood.    SKIN: Sun-exposed skin, which includes the head/face, neck, both arms, digits, and/or nails was examined.   - resolving crusted papules on arms and legs.   - Erythematous papule with overlying hemorrhagic crust on the left lateral knee with surrounding erythema and some mild edema.   - No other lesions of concern on areas examined.       Impression/Plan:  1. Bug bites with likely allergic reaction - suspect bed bugs     Resolved.     2.   Folliculitis with likely infection     Patient was seen last seen by ED yesterday where he was prescribed doxycycline 100mg - may continue this for next 9 days as well as the hydroxyzine 25mg po TID.     Start triamcinolone 0.025% cream - apply topically BID.     Follow-up as needed for new or changing lesions.       Staff Involved:    Scribe Disclosure  I, Kevin Birch, am serving as a scribe to document services personally performed by hafsa Garcia PA-C, based on data collection and the provider's statements to me.     Provider Disclosure:   The documentation recorded by the scribe accurately reflects the services I personally performed and the decisions made by me.    All risks, benefits and alternatives were discussed with patient.  Patient is in agreement and understands the assessment and  plan.  All questions were answered.    Annamarie Garcia PA-C  [facility]: Phone: [phone], Fax: [phone]

## 2018-09-09 ENCOUNTER — HOSPITAL ENCOUNTER (EMERGENCY)
Facility: CLINIC | Age: 53
Discharge: HOME | End: 2018-09-09
Attending: EMERGENCY MEDICINE | Admitting: EMERGENCY MEDICINE
Payer: COMMERCIAL

## 2018-09-09 ENCOUNTER — APPOINTMENT (OUTPATIENT)
Dept: ULTRASOUND IMAGING | Facility: CLINIC | Age: 53
End: 2018-09-09
Attending: EMERGENCY MEDICINE
Payer: COMMERCIAL

## 2018-09-09 ENCOUNTER — APPOINTMENT (OUTPATIENT)
Dept: GENERAL RADIOLOGY | Facility: CLINIC | Age: 53
End: 2018-09-09
Attending: EMERGENCY MEDICINE
Payer: COMMERCIAL

## 2018-09-09 VITALS
BODY MASS INDEX: 29.19 KG/M2 | RESPIRATION RATE: 20 BRPM | DIASTOLIC BLOOD PRESSURE: 94 MMHG | OXYGEN SATURATION: 98 % | TEMPERATURE: 97.9 F | HEIGHT: 64 IN | WEIGHT: 171 LBS | HEART RATE: 88 BPM | SYSTOLIC BLOOD PRESSURE: 137 MMHG

## 2018-09-09 DIAGNOSIS — G89.29 CHRONIC ABDOMINAL PAIN: ICD-10-CM

## 2018-09-09 DIAGNOSIS — R11.2 NON-INTRACTABLE VOMITING WITH NAUSEA, UNSPECIFIED VOMITING TYPE: ICD-10-CM

## 2018-09-09 DIAGNOSIS — G89.29 OTHER CHRONIC PAIN: ICD-10-CM

## 2018-09-09 DIAGNOSIS — R10.10 UPPER ABDOMINAL PAIN: ICD-10-CM

## 2018-09-09 DIAGNOSIS — R10.9 CHRONIC ABDOMINAL PAIN: ICD-10-CM

## 2018-09-09 LAB
ALBUMIN SERPL-MCNC: 3.8 G/DL (ref 3.4–5)
ALP SERPL-CCNC: 128 U/L (ref 40–150)
ALT SERPL W P-5'-P-CCNC: 33 U/L (ref 0–70)
ANION GAP SERPL CALCULATED.3IONS-SCNC: 7 MMOL/L (ref 3–14)
AST SERPL W P-5'-P-CCNC: 20 U/L (ref 0–45)
BASOPHILS # BLD AUTO: 0 10E9/L (ref 0–0.2)
BASOPHILS NFR BLD AUTO: 0.1 %
BILIRUB SERPL-MCNC: 0.7 MG/DL (ref 0.2–1.3)
BUN SERPL-MCNC: 11 MG/DL (ref 7–30)
CALCIUM SERPL-MCNC: 9.1 MG/DL (ref 8.5–10.1)
CHLORIDE SERPL-SCNC: 105 MMOL/L (ref 94–109)
CO2 SERPL-SCNC: 26 MMOL/L (ref 20–32)
CREAT SERPL-MCNC: 0.78 MG/DL (ref 0.66–1.25)
DIFFERENTIAL METHOD BLD: NORMAL
EOSINOPHIL # BLD AUTO: 0.3 10E9/L (ref 0–0.7)
EOSINOPHIL NFR BLD AUTO: 3.1 %
ERYTHROCYTE [DISTWIDTH] IN BLOOD BY AUTOMATED COUNT: 13.5 % (ref 10–15)
GFR SERPL CREATININE-BSD FRML MDRD: >90 ML/MIN/1.7M2
GLUCOSE BLDC GLUCOMTR-MCNC: 172 MG/DL (ref 70–99)
GLUCOSE SERPL-MCNC: 158 MG/DL (ref 70–99)
HCT VFR BLD AUTO: 45.4 % (ref 40–53)
HGB BLD-MCNC: 15.5 G/DL (ref 13.3–17.7)
IMM GRANULOCYTES # BLD: 0.1 10E9/L (ref 0–0.4)
IMM GRANULOCYTES NFR BLD: 1 %
LACTATE BLD-SCNC: 1.1 MMOL/L (ref 0.7–2)
LIPASE SERPL-CCNC: 239 U/L (ref 73–393)
LYMPHOCYTES # BLD AUTO: 2 10E9/L (ref 0.8–5.3)
LYMPHOCYTES NFR BLD AUTO: 24.3 %
MCH RBC QN AUTO: 30.9 PG (ref 26.5–33)
MCHC RBC AUTO-ENTMCNC: 34.1 G/DL (ref 31.5–36.5)
MCV RBC AUTO: 90 FL (ref 78–100)
MONOCYTES # BLD AUTO: 0.6 10E9/L (ref 0–1.3)
MONOCYTES NFR BLD AUTO: 6.7 %
NEUTROPHILS # BLD AUTO: 5.4 10E9/L (ref 1.6–8.3)
NEUTROPHILS NFR BLD AUTO: 64.8 %
NRBC # BLD AUTO: 0 10*3/UL
NRBC BLD AUTO-RTO: 0 /100
PLATELET # BLD AUTO: 260 10E9/L (ref 150–450)
POTASSIUM SERPL-SCNC: 3.4 MMOL/L (ref 3.4–5.3)
PROT SERPL-MCNC: 8 G/DL (ref 6.8–8.8)
RBC # BLD AUTO: 5.02 10E12/L (ref 4.4–5.9)
SODIUM SERPL-SCNC: 138 MMOL/L (ref 133–144)
WBC # BLD AUTO: 8.4 10E9/L (ref 4–11)

## 2018-09-09 PROCEDURE — 40000556 ZZH STATISTIC PERIPHERAL IV START W US GUIDANCE

## 2018-09-09 PROCEDURE — 25000125 ZZHC RX 250: Performed by: EMERGENCY MEDICINE

## 2018-09-09 PROCEDURE — 25000132 ZZH RX MED GY IP 250 OP 250 PS 637: Performed by: EMERGENCY MEDICINE

## 2018-09-09 PROCEDURE — 76705 ECHO EXAM OF ABDOMEN: CPT

## 2018-09-09 PROCEDURE — 96361 HYDRATE IV INFUSION ADD-ON: CPT | Mod: 59

## 2018-09-09 PROCEDURE — 99285 EMERGENCY DEPT VISIT HI MDM: CPT | Mod: 25 | Performed by: EMERGENCY MEDICINE

## 2018-09-09 PROCEDURE — 25000128 H RX IP 250 OP 636: Performed by: EMERGENCY MEDICINE

## 2018-09-09 PROCEDURE — 99284 EMERGENCY DEPT VISIT MOD MDM: CPT | Mod: 25

## 2018-09-09 PROCEDURE — 80053 COMPREHEN METABOLIC PANEL: CPT | Performed by: EMERGENCY MEDICINE

## 2018-09-09 PROCEDURE — 83735 ASSAY OF MAGNESIUM: CPT | Performed by: EMERGENCY MEDICINE

## 2018-09-09 PROCEDURE — 85025 COMPLETE CBC W/AUTO DIFF WBC: CPT | Performed by: EMERGENCY MEDICINE

## 2018-09-09 PROCEDURE — 99284 EMERGENCY DEPT VISIT MOD MDM: CPT | Mod: Z6 | Performed by: EMERGENCY MEDICINE

## 2018-09-09 PROCEDURE — 96374 THER/PROPH/DIAG INJ IV PUSH: CPT

## 2018-09-09 PROCEDURE — 74019 RADEX ABDOMEN 2 VIEWS: CPT

## 2018-09-09 PROCEDURE — 83605 ASSAY OF LACTIC ACID: CPT | Performed by: EMERGENCY MEDICINE

## 2018-09-09 PROCEDURE — 81003 URINALYSIS AUTO W/O SCOPE: CPT | Performed by: EMERGENCY MEDICINE

## 2018-09-09 PROCEDURE — 83690 ASSAY OF LIPASE: CPT | Performed by: EMERGENCY MEDICINE

## 2018-09-09 PROCEDURE — 99285 EMERGENCY DEPT VISIT HI MDM: CPT | Mod: Z6 | Performed by: EMERGENCY MEDICINE

## 2018-09-09 RX ORDER — ONDANSETRON 4 MG/1
4 TABLET, ORALLY DISINTEGRATING ORAL ONCE
Status: COMPLETED | OUTPATIENT
Start: 2018-09-09 | End: 2018-09-09

## 2018-09-09 RX ORDER — IBUPROFEN 600 MG/1
600 TABLET, FILM COATED ORAL ONCE
Status: COMPLETED | OUTPATIENT
Start: 2018-09-09 | End: 2018-09-09

## 2018-09-09 RX ORDER — ONDANSETRON 2 MG/ML
4 INJECTION INTRAMUSCULAR; INTRAVENOUS ONCE
Status: DISCONTINUED | OUTPATIENT
Start: 2018-09-09 | End: 2018-09-09 | Stop reason: HOSPADM

## 2018-09-09 RX ORDER — ONDANSETRON 4 MG/1
4 TABLET, FILM COATED ORAL EVERY 8 HOURS PRN
Qty: 10 TABLET | Refills: 0 | Status: SHIPPED | OUTPATIENT
Start: 2018-09-09 | End: 2018-10-05

## 2018-09-09 RX ADMIN — SODIUM CHLORIDE 1000 ML: 9 INJECTION, SOLUTION INTRAVENOUS at 08:17

## 2018-09-09 RX ADMIN — LIDOCAINE HYDROCHLORIDE 30 ML: 20 SOLUTION ORAL; TOPICAL at 09:57

## 2018-09-09 RX ADMIN — ONDANSETRON 4 MG: 4 TABLET, ORALLY DISINTEGRATING ORAL at 06:43

## 2018-09-09 RX ADMIN — IBUPROFEN 600 MG: 600 TABLET, FILM COATED ORAL at 09:23

## 2018-09-09 ASSESSMENT — ENCOUNTER SYMPTOMS
ABDOMINAL PAIN: 1
MUSCULOSKELETAL NEGATIVE: 1
NAUSEA: 1
PSYCHIATRIC NEGATIVE: 1
SHORTNESS OF BREATH: 0
DIARRHEA: 0
HEADACHES: 0
FEVER: 0
VOMITING: 1
EYES NEGATIVE: 1

## 2018-09-09 NOTE — ED PROVIDER NOTES
"  History     Chief Complaint   Patient presents with     Abdominal Pain     Nausea & Vomiting     HPI  Andrew Lockwood is a 50 year old male with a past medical history significant for diabetes, HIV, and chronic abdominal pain who presents with upper abdominal pain and vomiting.  He states the pain started at 11 PM last night.  Since that time, he has vomited 5 times.  He tells me he vomited twice while here in the ED.  He describes it as a sharp burning pain.  He has not been eating.  He denies diarrhea however.  He states he is usually bloated but not bloated today.  He is status post appendectomy    Social: Smoker, quit using alcohol in 2007.    I have reviewed the Medications, Allergies, Past Medical and Surgical History, and Social History in the Epic system.    Review of Systems   Constitutional: Negative for fever.   Eyes: Negative.    Respiratory: Negative for shortness of breath.    Cardiovascular: Negative for chest pain.   Gastrointestinal: Positive for abdominal pain, nausea and vomiting. Negative for diarrhea.   Genitourinary: Negative.    Musculoskeletal: Negative.    Skin: Negative for rash.   Neurological: Negative for headaches.   Psychiatric/Behavioral: Negative.    All other systems reviewed and are negative.      Physical Exam   BP: (!) 144/94  Pulse: 88  Temp: 97.9  F (36.6  C)  Resp: 20  Height: 162.6 cm (5' 4\")  Weight: 77.6 kg (171 lb)  SpO2: 100 %      Physical Exam  Physical Exam   Constitutional:   well nourished, well developed, resting comfortably, sleeping when I enter the room  HENT:   Head: Normocephalic and atraumatic.   Eyes: Conjunctivae are normal. Pupils are equal, round, and reactive to light.   pharynx has no erythema or exudate, mucous membranes are dry  Neck:   no adenopathy, no bony tenderness  Cardiovascular: regular rate and rhythm without murmurs or gallops  Pulmonary/Chest: Clear to auscultation bilaterally, with no wheezes or retractions. No respiratory distress.  GI: " Soft with good bowel sounds. Tender RUQ and midepigastric area, non-distended, with no guarding, no rebound, no peritoneal signs.   Back:  No bony or CVA tenderness   Musculoskeletal:  no edema or clubbing   Skin: Skin is warm and dry. No rash noted.   Neurological: alert and oriented to person, place, and time. Nonfocal exam  Psychiatric:  normal mood and affect.   ED Course     ED Course     Procedures             Critical Care time:  none          Results for orders placed or performed during the hospital encounter of 09/09/18 (from the past 24 hour(s))   Abdomen US, limited (RUQ only)    Narrative    Preliminary  1.  Normal right upper quadrant ultrasound.     XR Abdomen 2 Views    Narrative    Preliminary    Impression    IMPRESSION: Nonobstructive bowel gas pattern.        Labs Ordered and Resulted from Time of ED Arrival Up to the Time of Departure from the ED - No data to display       Laboratory studies:  Please see MDM below:  Lab is on downtime and faxing results.    Assessments & Plan (with Medical Decision Making)       I have reviewed the nursing notes.  Emergency Department course:  The patient was seen and examined at 0700 am, on my arrival to the shift.  Right upper quadrant ultrasound is unremarkable.  Two-way abdominal x-ray shows a nonobstructive bowel gas pattern.  Laboratory studies were delayed due to difficulty obtaining an IV and further delayed due to laboratory downtime.  Laboratory studies show a normal lactate of 1.1.  Sodium is 138, potassium is 3.4, chloride 105, bicarb 26, glucose 158, BUN 11, creatinine 0.78, GFR greater than 90, calcium 9.1, LFTs are all within normal limits.  CBC shows a WBC of 8.35, hemoglobin globin of 15.5, platelets of 260,000.  Differential is normal.  UA has a pH of 6.5 with 5 ketones and greater than thousand glucose.  It is nitrite and leukocyte esterase negative.    I treated the patient with normal saline IV, Zofran IV, and a GI cocktail as well as  ibuprofen p.o.    The patient is a 50-year-old male with chronic abdominal pain here with upper abdominal pain as well as nausea and vomiting.  He is feeling better after the above interventions.  His radiographic and laboratory studies are essentially unremarkable.  I believe he is safe to be discharged home with close outpatient follow-up.  He does have a GI doctor and a regular doctor with whom he can follow-up.  I prescribed Zofran on discharge that he can use as needed.  He should drink small amounts of fluid frequently.  I counseled the patient in my usual and standard fashion for abdominal pain  and reasons to return to the Emergency Department.           I have reviewed the findings, diagnosis, plan and need for follow up with the patient.    New Prescriptions    ONDANSETRON (ZOFRAN) 4 MG TABLET    Take 1 tablet (4 mg) by mouth every 8 hours as needed for nausea       Final diagnoses:   Chronic abdominal pain   Non-intractable vomiting with nausea, unspecified vomiting type       9/9/2018   [facility], [place], EMERGENCY DEPARTMENT  This note was created in part by the use of Dragon voice recognition dictation system. Inadvertent grammatical errors and typographical errors may still exist.  MD Lindsey Chen Alda L, MD  09/09/18 0708

## 2018-09-09 NOTE — ED AVS SNAPSHOT
[facility], Emergency Department    [address] [facility] [address]    Phone:  [phone]                                       Andrew Lockwood   MRN: [id number]    Department:  [facility], Emergency Department   Date of Visit:  9/9/2018           Patient Information     Date Of Birth          11/27/1967        Your diagnoses for this visit were:     Chronic abdominal pain     Non-intractable vomiting with nausea, unspecified vomiting type        You were seen by Juanita Portillo MD.        Discharge Instructions       Please make an appointment to follow up with Your Primary Care Provider in 1-7 days.  Please make an appointment to follow up with GI Clinic (phone: [phone]) in 7 days.  Drink small amounts of fluid frequently.  Take Zofran as needed for nausea and vomiting.  Please follow-up with your regular doctor this week for recheck.    Your next 10 appointments already scheduled     Oct 08, 2018  1:00 PM CDT   (Arrive by 12:45 PM)   Return Visit with Rekha Membreno MD   [facility] ([facility])    [address] [place] [id number] [phone]              24 Hour Appointment Hotline       To make an appointment at any [facility], call [phone] ([phone]). If you don't have a family doctor or clinic, we will help you find one. [facility] are conveniently located to serve the needs of you and your family.             Review of your medicines      START taking        Dose / Directions Last dose taken    ondansetron 4 MG tablet   Commonly known as:  ZOFRAN   Dose:  4 mg   Quantity:  10 tablet        Take 1 tablet (4 mg) by mouth every 8 hours as needed for nausea   Refills:  0          Our records show that you are taking the medicines listed below. If these are incorrect, please call your family doctor or clinic.        Dose / Directions Last dose taken    Bictegravir-Emtricitab-Tenofov -25 MG  Tabs   Dose:  1 tablet   Quantity:  30 tablet        Take 1 tablet by mouth daily   Refills:  11        blood glucose lancets standard   Commonly known as:  no brand specified   Quantity:  100 Box        Use to test blood sugar four times daily or as directed.   Refills:  5        blood glucose monitoring lancets   Quantity:  100 each        Use to test blood sugar 2 times daily or as directed.  Ok to substitute alternative if insurance prefers.   Refills:  11        * blood glucose monitoring meter device kit   Commonly known as:  no brand specified   Quantity:  1 kit        Use to test blood sugar four times daily or as directed.   Refills:  0        * blood glucose monitoring meter device kit   Commonly known as:  no brand specified   Quantity:  1 kit        Use to test blood sugar 4 times daily or as directed.   Refills:  0        * blood glucose monitoring test strip   Commonly known as:  no brand specified   Quantity:  100 each        Use to test blood sugars four times daily or as directed   Refills:  5        * blood glucose monitoring test strip   Commonly known as:  no brand specified   Dose:  1 strip   Quantity:  100 strip        1 strip by In Vitro route 4 times daily (before meals and nightly) Use to test blood sugars 4 times daily or as directed   Refills:  11        * blood glucose monitoring test strip   Commonly known as:  GELACIO CONTOUR NEXT   Quantity:  100 strip        Use to test blood sugar 3 times daily or as directed.  Ok to substitute alternative if insurance prefers.   Refills:  11        doxycycline 100 MG capsule   Commonly known as:  VIBRAMYCIN   Dose:  100 mg   Quantity:  20 capsule        Take 1 capsule (100 mg) by mouth 2 times daily   Refills:  0        empagliflozin 10 MG Tabs tablet   Commonly known as:  JARDIANCE   Quantity:  90 tablet        Take 1 tablet by mouth daily   Refills:  3        hydrOXYzine 25 MG tablet   Commonly known as:  ATARAX   Dose:  25 mg   Quantity:  15 tablet         Take 1 tablet (25 mg) by mouth 3 times daily for 5 days   Refills:  0        * insulin pen needle 31G X 8 MM   Commonly known as:  B-D U/F   Quantity:  100 each        Use 1 pen needles daily or as directed.   Refills:  0        * insulin pen needle 32G X 4 MM   Commonly known as:  BD SCOTT U/F   Quantity:  100 each        Use 1 daily as directed.   Refills:  11        LANTUS SOLOSTAR 100 UNIT/ML injection   Quantity:  15 mL   Generic drug:  insulin glargine        Take 14 units each morning   Refills:  3        omeprazole 20 MG CR capsule   Commonly known as:  priLOSEC   Dose:  20 mg   Quantity:  30 capsule        Take 1 capsule (20 mg) by mouth daily   Refills:  1        permethrin 5 % cream   Commonly known as:  ELIMITE   Quantity:  60 g        Apply cream from head to toe (except the face); leave on for 8-14 hours then wash off with water; reapply in 1 week   Refills:  1        traMADol 50 MG tablet   Commonly known as:  ULTRAM   Dose:  50 mg   Quantity:  10 tablet        Take 1 tablet (50 mg) by mouth every 6 hours as needed for pain   Refills:  0        triamcinolone 0.025 % cream   Commonly known as:  KENALOG   Quantity:  15 g        Apply topically 2 times daily   Refills:  0        * Notice:  This list has 7 medication(s) that are the same as other medications prescribed for you. Read the directions carefully, and ask your doctor or other care provider to review them with you.            Prescriptions were sent or printed at these locations (1 Prescription)                   Other Prescriptions                Printed at Department/Unit printer (1 of 1)         ondansetron (ZOFRAN) 4 MG tablet                Procedures and tests performed during your visit     Abdomen US, limited (RUQ only)    CBC with platelets differential    Comprehensive metabolic panel    Lactic acid whole blood    Lipase    UA reflex to Microscopic and Culture    V[facility] Care Adult IP Consult    XR Abdomen 2 Views       Orders Needing Specimen Collection     None      Pending Results     Date and Time Order Name Status Description    9/9/2018 0718 XR Abdomen 2 Views Preliminary     9/9/2018 0710 Abdomen US, limited (RUQ only) Preliminary             Pending Culture Results     No orders found from 9/7/2018 to 9/10/2018.            Pending Results Instructions     If you had any lab results that were not finalized at the time of your Discharge, you can call the ED Lab Result RN at [phone]. You will be contacted by this team for any positive Lab results or changes in treatment. The nurses are available 7 days a week from 10A to 6:30P.  You can leave a message 24 hours per day and they will return your call.        Thank you for choosing [facility]       Thank you for choosing [facility] for your care. Our goal is always to provide you with excellent care. Hearing back from our patients is one way we can continue to improve our services. Please take a few minutes to complete the written survey that you may receive in the mail after you visit with us. Thank you!        [employer] Information     [facility] gives you secure access to your electronic health record. If you see a primary care provider, you can also send messages to your care team and make appointments. If you have questions, please call your primary care clinic.  If you do not have a primary care provider, please call [phone] and they will assist you.        Care EveryWhere ID     This is your Care EveryWhere ID. This could be used by other organizations to access your [facility] medical records  [id number]        Equal Access to Services     RA JORDAN : Hadii carissa Cody, waaxda luqadaha, qaybta kaalmada dez reyna. So [facility] [phone].    ATENCIÓN: Si habla español, tiene a kohli disposición servicios gratuitos de asistencia lingüística. Llame al [phone].    We comply with applicable federal civil rights laws  and [place] laws. We do not discriminate on the basis of race, color, national origin, age, disability, sex, sexual orientation, or gender identity.            After Visit Summary       This is your record. Keep this with you and show to your community pharmacist(s) and doctor(s) at your next visit.

## 2018-09-09 NOTE — DISCHARGE INSTRUCTIONS
Please make an appointment to follow up with Your Primary Care Provider in 1-7 days.  Please make an appointment to follow up with GI Clinic (phone: [phone]) in 7 days.  Drink small amounts of fluid frequently.  Take Zofran as needed for nausea and vomiting.  Please follow-up with your regular doctor this week for recheck.

## 2018-09-09 NOTE — ED AVS SNAPSHOT
[facility], [place], Emergency Department    [address] [place] [address]    Phone:  [phone]                                       Andrew Lockwood   MRN: [id number]    Department:  [facility], Emergency Department   Date of Visit:  9/9/2018           After Visit Summary Signature Page     I have received my discharge instructions, and my questions have been answered. I have discussed any challenges I see with this plan with the nurse or doctor.    ..........................................................................................................................................  Patient/Patient Representative Signature      ..........................................................................................................................................  Patient Representative Print Name and Relationship to Patient    ..................................................               ................................................  Date                                            Time    ..........................................................................................................................................  Reviewed by Signature/Title    ...................................................              ..............................................  Date                                                            Time          22EPIC Rev 08/18

## 2018-09-09 NOTE — ED PROVIDER NOTES
"  History     Chief Complaint   Patient presents with     Abdominal Pain     Nausea & Vomiting     HPI  Andrew Lockwood is a 50 year old male who ***    I have reviewed the Medications, Allergies, Past Medical and Surgical History, and Social History in the Epic system.    Review of Systems    Physical Exam   BP: (!) 144/94  Pulse: 88  Temp: 97.9  F (36.6  C)  Resp: 20  Height: 162.6 cm (5' 4\")  Weight: 77.6 kg (171 lb)  SpO2: 100 %      Physical Exam    ED Course     ED Course     Procedures        {EKG done?:235261::\" \"}    Critical Care time:  {none or minutes:239600::\"none\"}  {trauma activation or Fall?:342673::\" \"}  {Sepsis/Septic Shock/Stemi/Stroke:925457::\" \"}       Labs Ordered and Resulted from Time of ED Arrival Up to the Time of Departure from the ED - No data to display         Assessments & Plan (with Medical Decision Making)   ***    I have reviewed the nursing notes.    I have reviewed the findings, diagnosis, plan and need for follow up with the patient.    New Prescriptions    No medications on file       Final diagnoses:   None       9/9/2018   [facility], [place], EMERGENCY DEPARTMENT    "

## 2018-09-09 NOTE — ED TRIAGE NOTES
Patient presents to ED c/o abdominal pain starting at 2300 last night and nausea/vomiting starting approximately 30 min ago.

## 2018-09-10 ENCOUNTER — APPOINTMENT (OUTPATIENT)
Dept: CT IMAGING | Facility: CLINIC | Age: 53
End: 2018-09-10
Attending: EMERGENCY MEDICINE
Payer: COMMERCIAL

## 2018-09-10 ENCOUNTER — APPOINTMENT (OUTPATIENT)
Dept: GENERAL RADIOLOGY | Facility: CLINIC | Age: 53
End: 2018-09-10
Attending: NURSE PRACTITIONER
Payer: COMMERCIAL

## 2018-09-10 ENCOUNTER — HOSPITAL ENCOUNTER (INPATIENT)
Facility: CLINIC | Age: 53
LOS: 3 days | Discharge: HOME | End: 2018-09-13
Attending: EMERGENCY MEDICINE | Admitting: INTERNAL MEDICINE
Payer: COMMERCIAL

## 2018-09-10 DIAGNOSIS — K56.609 SMALL BOWEL OBSTRUCTION (H): ICD-10-CM

## 2018-09-10 PROBLEM — K56.0 ADYNAMIC ILEUS (H): Status: ACTIVE | Noted: 2018-09-10

## 2018-09-10 LAB
ALBUMIN UR-MCNC: NEGATIVE MG/DL
APPEARANCE UR: CLEAR
BILIRUB UR QL STRIP: NEGATIVE
COLOR UR AUTO: YELLOW
GLUCOSE BLDC GLUCOMTR-MCNC: 177 MG/DL (ref 70–99)
GLUCOSE BLDC GLUCOMTR-MCNC: 181 MG/DL (ref 70–99)
GLUCOSE BLDC GLUCOMTR-MCNC: 94 MG/DL (ref 70–99)
GLUCOSE UR STRIP-MCNC: >1000 MG/DL
HGB UR QL STRIP: NEGATIVE
KETONES UR STRIP-MCNC: 5 MG/DL
LEUKOCYTE ESTERASE UR QL STRIP: NEGATIVE
MAGNESIUM SERPL-MCNC: 2.3 MG/DL (ref 1.6–2.3)
NITRATE UR QL: NEGATIVE
PH UR STRIP: 6.5 PH (ref 5–7)
RADIOLOGIST FLAGS: ABNORMAL
SOURCE: ABNORMAL
SP GR UR STRIP: 1.03 (ref 1–1.03)
UROBILINOGEN UR STRIP-MCNC: 0.2 MG/DL (ref 0–2)

## 2018-09-10 PROCEDURE — 00000146 ZZHCL STATISTIC GLUCOSE BY METER IP

## 2018-09-10 PROCEDURE — 96376 TX/PRO/DX INJ SAME DRUG ADON: CPT

## 2018-09-10 PROCEDURE — 40000986 XR ABDOMEN 1 VW

## 2018-09-10 PROCEDURE — G0378 HOSPITAL OBSERVATION PER HR: HCPCS

## 2018-09-10 PROCEDURE — 25000128 H RX IP 250 OP 636: Performed by: NURSE PRACTITIONER

## 2018-09-10 PROCEDURE — 12000008 ZZH R&B INTERMEDIATE UMMC

## 2018-09-10 PROCEDURE — 25000131 ZZH RX MED GY IP 250 OP 636 PS 637: Performed by: PHYSICIAN ASSISTANT

## 2018-09-10 PROCEDURE — 96374 THER/PROPH/DIAG INJ IV PUSH: CPT

## 2018-09-10 PROCEDURE — 99232 SBSQ HOSP IP/OBS MODERATE 35: CPT | Performed by: PHYSICIAN ASSISTANT

## 2018-09-10 PROCEDURE — 74176 CT ABD & PELVIS W/O CONTRAST: CPT

## 2018-09-10 RX ORDER — SODIUM CHLORIDE 9 MG/ML
INJECTION, SOLUTION INTRAVENOUS CONTINUOUS
Status: DISCONTINUED | OUTPATIENT
Start: 2018-09-10 | End: 2018-09-12

## 2018-09-10 RX ORDER — MORPHINE SULFATE 2 MG/ML
1-2 INJECTION, SOLUTION INTRAMUSCULAR; INTRAVENOUS EVERY 4 HOURS PRN
Status: DISCONTINUED | OUTPATIENT
Start: 2018-09-10 | End: 2018-09-11

## 2018-09-10 RX ORDER — ACETAMINOPHEN 650 MG/1
650 SUPPOSITORY RECTAL EVERY 4 HOURS PRN
Status: DISCONTINUED | OUTPATIENT
Start: 2018-09-10 | End: 2018-09-12

## 2018-09-10 RX ORDER — NALOXONE HYDROCHLORIDE 0.4 MG/ML
.1-.4 INJECTION, SOLUTION INTRAMUSCULAR; INTRAVENOUS; SUBCUTANEOUS
Status: DISCONTINUED | OUTPATIENT
Start: 2018-09-10 | End: 2018-09-13 | Stop reason: HOSPADM

## 2018-09-10 RX ORDER — ONDANSETRON 2 MG/ML
4 INJECTION INTRAMUSCULAR; INTRAVENOUS EVERY 6 HOURS PRN
Status: DISCONTINUED | OUTPATIENT
Start: 2018-09-10 | End: 2018-09-13 | Stop reason: HOSPADM

## 2018-09-10 RX ORDER — ONDANSETRON 4 MG/1
4 TABLET, ORALLY DISINTEGRATING ORAL EVERY 6 HOURS PRN
Status: DISCONTINUED | OUTPATIENT
Start: 2018-09-10 | End: 2018-09-13 | Stop reason: HOSPADM

## 2018-09-10 RX ORDER — NICOTINE POLACRILEX 4 MG
15-30 LOZENGE BUCCAL
Status: DISCONTINUED | OUTPATIENT
Start: 2018-09-10 | End: 2018-09-13 | Stop reason: HOSPADM

## 2018-09-10 RX ORDER — ACETAMINOPHEN 325 MG/1
650 TABLET ORAL EVERY 4 HOURS PRN
Status: DISCONTINUED | OUTPATIENT
Start: 2018-09-10 | End: 2018-09-13 | Stop reason: HOSPADM

## 2018-09-10 RX ORDER — DEXTROSE MONOHYDRATE 25 G/50ML
25-50 INJECTION, SOLUTION INTRAVENOUS
Status: DISCONTINUED | OUTPATIENT
Start: 2018-09-10 | End: 2018-09-13 | Stop reason: HOSPADM

## 2018-09-10 RX ADMIN — SODIUM CHLORIDE: 9 INJECTION, SOLUTION INTRAVENOUS at 11:57

## 2018-09-10 RX ADMIN — INSULIN GLARGINE 10 UNITS: 100 INJECTION, SOLUTION SUBCUTANEOUS at 21:13

## 2018-09-10 RX ADMIN — MORPHINE SULFATE 1 MG: 2 INJECTION, SOLUTION INTRAMUSCULAR; INTRAVENOUS at 13:06

## 2018-09-10 RX ADMIN — MORPHINE SULFATE 1 MG: 2 INJECTION, SOLUTION INTRAMUSCULAR; INTRAVENOUS at 13:45

## 2018-09-10 RX ADMIN — MORPHINE SULFATE 2 MG: 2 INJECTION, SOLUTION INTRAMUSCULAR; INTRAVENOUS at 19:48

## 2018-09-10 ASSESSMENT — ACTIVITIES OF DAILY LIVING (ADL)
DRESS: 0-->INDEPENDENT
ADLS_ACUITY_SCORE: 10
TRANSFERRING: 0-->INDEPENDENT
SWALLOWING: 0-->SWALLOWS FOODS/LIQUIDS WITHOUT DIFFICULTY
COGNITION: 0 - NO COGNITION ISSUES REPORTED
ADLS_ACUITY_SCORE: 10
TOILETING: 0-->INDEPENDENT
AMBULATION: 0-->INDEPENDENT
FALL_HISTORY_WITHIN_LAST_SIX_MONTHS: NO
BATHING: 0-->INDEPENDENT
RETIRED_COMMUNICATION: 0-->UNDERSTANDS/COMMUNICATES WITHOUT DIFFICULTY
RETIRED_EATING: 0-->INDEPENDENT

## 2018-09-10 ASSESSMENT — PAIN DESCRIPTION - DESCRIPTORS
DESCRIPTORS: ACHING;CONSTANT
DESCRIPTORS: CONSTANT
DESCRIPTORS: CONSTANT;SHARP

## 2018-09-10 NOTE — PLAN OF CARE
Problem: Patient Care Overview  Goal: Plan of Care/Patient Progress Review  Outcome: No Change  Focus: Admission  D/I: Andrew arrived on 7C at 1100, alert and oriented. VSS, afebrile. C/O right sided sharp abdominal pain, treated with morphine 1mg X 2 doses. MIV started at 125cc/hour, NPO. NGT placed at bedside, abdominal xray done to verify placement. Showed need for advancement by 8cm which was done by this nurse. Set to LIS. Admission profile done, pt personal medications sent to security.  Med rec done, waiting for daily medication orders. Currently observation status (see prior note for goals met).   A/P: Continue plan of care per orders.

## 2018-09-10 NOTE — H&P
[facility] ED Observation Admission Note    Chief Complaint   Patient presents with     Abdominal Pain     Vomiting       Assessment/Plan:  Andrew Lockwood is a 50 year old male with a medical history significant for HIV/AIDS, type 2 diabetes mellitus, CNS toxoplasmosis and chronic abdominal pain.  Per review of patient's chart, patient was seen in the Emergency Department here yesterday complaining of upper abdominal pain, nausea and vomiting. He returned to the Emergency Dept with the same complaints yesterday evening and a CT scan of the abdomen was completed.     1. N/V, abdominal pain and distention of 1 day concerning for SBO. Pain is localized in lower abdomen, right >left, rates it as 7-8 out of 10. CT today showed Mildly dilated loops of small bowel in the central abdomen.however the transition is not sharp.  CBC and BMP WNL aside from . Vitals: /88 Hr 93, Temp 99.4. UA negative.  - NPO  - antiemetics  - IVF @ 150 for now to assure adequate hydration  - Serial abdominal exams  -Appreciate surgery input.  -Order for NG tube placed, per surgery recs  -Will speak with medicine and see about transferring pt to their service as it is not appearing likely patient will DC to home in 24-48hrs.    # HIV/AIDS  - Holding oral HAART for now.     # History of Toxoplasmosis w/ ongoing MRI abnormalities   Per ID, diagnosed with HIV/AIDS in 2015 when he presented with toxoplasmosis. He has had difficulty with adherence and previously been treated with Triumeq and Genvoya. He was recently switched to Biktarvy on 7/19/2018 and reports excellent adherence since that time. He remains homeless and is not currently sexually active.   -  continue with his current regimen and follow closely in clinic     # DM2  -- Glucose checks QID AC/HS, hypoglycemia protocol  -- LDSSI  -- Will need to [place] [facility] [place] to verify whether patient getting dulaglutide and what dose is     # Hx appendicitis c/b abdominal abscess  S/p  "appendectomy. Healing well and off abx.      # Social situation  Per chart review, social work has been involved. From telephone encounter on 2/23/18:   \"This is a summary note related to pts ongoing stressors related to homelessness, poverty, immigration status/complications and his limited ability to manage  his complex chronic care needs. After several failed intake appts, he was able to complete phone intake for [facility] case mgmt yesterday. The intent is to encourage his use of the elaborate resources for housing, , advocacy and resource coordination through [facility]. He is continuing to stay with a friend, even though this is not ideal, it seems adequate for the moment. He also voices various intentions about moving out of state and is currently discussing gender dysmorphic tendencies. He has limited ability to focus and manage his day to day affairs. He does know very well how to access emergent community resources for food, transpotation. Even though he has all these challenges, he manages to get his basic needs met . He has a very sophisticated  addressing permanetcy and his is hoping to get a  at  [facility] for minor unresolved citations which are affecting his eligibility for [address] financial aid. He frequently walks in to the [facility] to ask for advice from his various clinical relationships. The [facility] program knows him historically the best and will continue to assist.   Simon Cadena, [facility]    [phone]\"     FEN: NS @125cc/hr  Replete lytes prn  Consistent carb diet  Prophylaxis:  DVT: Mechanical   GI: Not indicated  Disposition: Admit to inpatient, dispo pending safe discharge plan and PO abx plan.   Code Status: FULL CODE    HPI:    Andrew Lockwood is a 50 year old male with a history of significant for HIV/AIDS, type 2 diabetes mellitus, CNS toxoplasmosis and chronic abdominal pain who presented to the ED twice in 24 hrs with increased, acute " abdominal pain in the lower abdomen. Pain began last evening around 1800 after eating dinner.  Described as crampy pain.  Located in mid abdomen.  Endorses nausea and vomiting x2.  He denies fevers.  His last BM was approximately 3 hours ago.  He states that this pain is quite common for him.  He thinks in the past it has been secondary to metformin which he has now stopped.  He also states that he has required and NG tube in the past for several days although the details are somewhat unclear.       In the ED, Lactate normal. WBC 8.35. Hgb 15.5. Platelets 260,000, rest were essentially unremarkable. Pt was given IV NS, zofran, GI cocktail, and ibuprofen. Patient was discharged to home with a script for Zofran. He returned and CT Scan was done.   IMPRESSION: Mildly dilated loops of small bowel in the central abdomen  however with gentle transition, suggesting against high-grade small  bowel obstruction. Other considerations include an adynamic ileus and  mild enteritis.    On admission to the observation unit the patient was stable.     History:    Past Medical History:   Diagnosis Date     AIDS (H)      Allergic rhinitis due to other allergen     DNS     Chronic abdominal pain      CNS toxoplasmosis (H)      Diabetes type 2, controlled (H)      GERD (gastroesophageal reflux disease)      HIV (human immunodeficiency virus infection) (H)      Periungual wart      Sleep apnea     doesn't use CPAP       Past Surgical History:   Procedure Laterality Date     C NONSPECIFIC PROCEDURE      right forearm fracture     COLONOSCOPY Left 1/22/2016    Procedure: COMBINED COLONOSCOPY, SINGLE OR MULTIPLE BIOPSY/POLYPECTOMY BY BIOPSY;  Surgeon: Clark Saini MD;  [facility]: [facility]     HC EXPLORE UNDESC TESTIS,INGUIN/SCROTAL       LAPAROSCOPIC APPENDECTOMY N/A 1/31/2018    Procedure: LAPAROSCOPIC APPENDECTOMY;  LAPAROSCOPIC APPENDECTOMY;  Surgeon: Dawn Holt MD;  [facility]     LAPAROSCOPY DIAGNOSTIC  "(GENERAL) N/A 7/26/2016    Procedure: LAPAROSCOPY DIAGNOSTIC (GENERAL);  Surgeon: Susannah Arriaga MD;  [facility]     LAPAROSCOPY DIAGNOSTIC (GENERAL) N/A 4/16/2018    Procedure: LAPAROSCOPY DIAGNOSTIC (GENERAL);  Diagnostic laparoscopy and lysis of adhesions;  Surgeon: Prince Dowling MD;  [facility]     OPTICAL TRACKING SYSTEM CRANIOTOMY, EXCISE TUMOR, COMBINED Left 4/10/2015    Procedure: COMBINED OPTICAL TRACKING SYSTEM CRANIOTOMY, EXCISE TUMOR;  Surgeon: Mirlande Colmenares MD;  [facility]     REPAIR GAMEKEEPER'S THUMB Right 12/2/2016    Procedure: REPAIR LIGAMENT ULNAR COLLATERAL THUMB (GAMEKEEPER'S);  Surgeon: Evin Zamorano MD;  [facility]       Family History   Problem Relation Age of Onset     Diabetes Brother      Diabetes Father      Alzheimer Disease Father      Unknown/Adopted Mother      Diabetes Paternal Grandfather      Cancer No family hx of      no skin cancer     Skin Cancer No family hx of      no famiy hx of skin cancer     Glaucoma No family hx of      Macular Degeneration No family hx of        Social History     Social History     Marital status: Single     Spouse name: N/A     Number of children: N/A     Years of education: N/A     Occupational History           5 years; temp agency     Social History Main Topics     Smoking status: Current Every Day Smoker     Packs/day: 0.50     Types: Cigarettes     Last attempt to quit: 10/28/2016     Smokeless tobacco: Former User      Comment: quit Aug 2018     Alcohol use No      Comment: Last etoh in 2007     Drug use: No      Comment: \"Not very often\"     Sexual activity: Not Currently     Partners: Female, Male      Comment: Last sexual activity 2008     Other Topics Concern     Not on file     Social History Narrative    Born in [facility].  [place]ame [place] the [place] in 1993.  Last traveled to visit family in 2008.              Current Facility-Administered Medications on File Prior to " Encounter:  [COMPLETED] 0.9% sodium chloride BOLUS   [COMPLETED] ibuprofen (ADVIL/MOTRIN) tablet 600 mg   lidocaine (PF) (XYLOCAINE) 1 % injection 4 mL   [COMPLETED] lidocaine (viscous) (XYLOCAINE) 2 % 15 mL, alum & mag hydroxide-simethicone (MYLANTA ES/MAALOX  ES) 15 mL GI Cocktail   [COMPLETED] ondansetron (ZOFRAN-ODT) ODT tab 4 mg   triamcinolone acetonide (KENALOG-40) injection 40 mg   [DISCONTINUED] ondansetron (ZOFRAN) injection 4 mg     Current Outpatient Prescriptions on File Prior to Encounter:  Bictegravir-Emtricitab-Tenofov -25 MG TABS Take 1 tablet by mouth daily   blood glucose (NO BRAND SPECIFIED) lancets standard Use to test blood sugar four times daily or as directed.   blood glucose monitoring (GELACIO CONTOUR NEXT) test strip Use to test blood sugar 3 times daily or as directed.  Ok to substitute alternative if insurance prefers.   blood glucose monitoring (GELACIO MICROLET) lancets Use to test blood sugar 2 times daily or as directed.  Ok to substitute alternative if insurance prefers.   blood glucose monitoring (NO BRAND SPECIFIED) meter device kit Use to test blood sugar 4 times daily or as directed.   blood glucose monitoring (NO BRAND SPECIFIED) meter device kit Use to test blood sugar four times daily or as directed.   blood glucose monitoring (NO BRAND SPECIFIED) test strip 1 strip by In Vitro route 4 times daily (before meals and nightly) Use to test blood sugars 4 times daily or as directed   blood glucose monitoring (NO BRAND SPECIFIED) test strip Use to test blood sugars four times daily or as directed   doxycycline (VIBRAMYCIN) 100 MG capsule Take 1 capsule (100 mg) by mouth 2 times daily   empagliflozin (JARDIANCE) 10 MG TABS tablet Take 1 tablet by mouth daily   hydrOXYzine (ATARAX) 25 MG tablet Take 1 tablet (25 mg) by mouth 3 times daily for 5 days   insulin glargine (LANTUS SOLOSTAR) 100 UNIT/ML pen Take 14 units each morning   insulin pen needle (B-D U/F) 31G X 8 MM Use 1 pen  needles daily or as directed.   insulin pen needle (BD SCOTT U/F) 32G X 4 MM Use 1 daily as directed.   omeprazole (PRILOSEC) 20 MG CR capsule Take 1 capsule (20 mg) by mouth daily   ondansetron (ZOFRAN) 4 MG tablet Take 1 tablet (4 mg) by mouth every 8 hours as needed for nausea   permethrin (ELIMITE) 5 % cream Apply cream from head to toe (except the face); leave on for 8-14 hours then wash off with water; reapply in 1 week   traMADol (ULTRAM) 50 MG tablet Take 1 tablet (50 mg) by mouth every 6 hours as needed for pain   triamcinolone (KENALOG) 0.025 % cream Apply topically 2 times daily       Data:    Results for orders placed or performed during the hospital encounter of 09/10/18   CT Abdomen Pelvis w/o Contrast    Narrative    Preliminary report:  This is a preliminary resident interpretation. Full report to follow.         Impression    IMPRESSION: Mildly dilated loops of small bowel in the central abdomen  however the transition is not sharp, suggesting against high-grade  obstruction, but cannot exclude low-grade. Differential diagnosis  includes mild enteritis.         ROS:    Review Of Systems  Skin: negative  Eyes: negative  Ears/Nose/Throat: negative  Respiratory: No shortness of breath, dyspnea on exertion, cough, or hemoptysis  Cardiovascular: negative  Gastrointestinal: positive for nausea and vomiting and abdominal pain, as per HPI  Genitourinary: negative  Musculoskeletal: negative  Neurologic: negative  Psychiatric: negative  Hematologic/Lymphatic/Immunologic: negative  Endocrine: negative    10 point ROS negative other than the symptoms noted above.      Exam:    Vitals:  B/P: 140/88, T: 99.4, P: 93    Constitutional: healthy, alert and no distress  Head: Normocephalic. No masses, lesions, tenderness or abnormalities  Neck: Neck supple. No adenopathy. Thyroid symmetric, normal size,, Carotids without bruits.  ENT: ENT exam normal, no neck nodes or sinus tenderness  Cardiovascular: negative, PMI  normal. No lifts, heaves, or thrills. RRR. No murmurs, clicks gallops or rub  Respiratory: negative, Percussion normal. Good diaphragmatic excursion. Lungs clear  Gastrointestinal: Abdomen soft, tender in lower abdomen. BS present. No masses, organomegaly.  : Deferred  Musculoskeletal: extremities normal- no gross deformities noted, gait normal and normal muscle tone  Skin: no suspicious lesions or rashes  Neurologic: Gait normal. Reflexes normal and symmetric. Sensation grossly WNL.  Psychiatric: mentation appears normal and affect normal/bright  Hematologic/Lymphatic/Immunologic: normal ant/post cervical, axillary, supraclavicular and inguinal nodes    Consults: general surgery, patient will transfer to Medicine  Code Status: full    Signed:  [unfilled]  September 10, 2018 at 5:45 AM

## 2018-09-10 NOTE — PHARMACY
Patient supply of Biktarvy was obtained from medication envelope from security and placed in a pill bottle and labeled. 4 tablets were present. The security envelope was re-sealed and placed in the patients bin. The nurse will page security to  the envelope.    Marcus Sumner, MalvinD

## 2018-09-10 NOTE — PHARMACY-ADMISSION MEDICATION HISTORY
Admission medication history interview status for the 9/10/2018 admission is complete. See Epic admission navigator for allergy information, pharmacy, prior to admission medications and immunization status.     Medication history interview sources:   Patient     Changes made to PTA medication list (reason)  Added: None   Deleted: permethrin 5% cream to apply cream from head to toe. Leave on for 8-14 hours then wash off with water. Reapply in 1 week.   Changed: Lantus 14 units QAM-> 50 units QAM (Patient stated by injecting this dose, he can eat whatever he wants).     Additional medication history information (including reliability of information, actions taken by pharmacist):  -Patient is not a very good medication historian due to mental status  -Patient stated the ED took all his medications so some of the medications he is not currently on at home.         Prior to Admission medications    Medication Sig Last Dose Taking? Auth Provider   Bictegravir-Emtricitab-Tenofov -25 MG TABS Take 1 tablet by mouth daily 9/8/2018 Yes Sharon Rosado MD   doxycycline (VIBRAMYCIN) 100 MG capsule Take 1 capsule (100 mg) by mouth 2 times daily 9/8/2018 Yes Pieter Bruno MD   empagliflozin (JARDIANCE) 10 MG TABS tablet Take 1 tablet by mouth daily 9/8/2018 Yes Lenora Haley PA-C   insulin glargine (LANTUS SOLOSTAR) 100 UNIT/ML pen Inject 50 Units Subcutaneous every morning  9/8/2018 Yes Ashley Connelly PA-C   omeprazole (PRILOSEC) 20 MG CR capsule Take 1 capsule (20 mg) by mouth daily 9/8/2018 Yes    blood glucose (NO BRAND SPECIFIED) lancets standard Use to test blood sugar four times daily or as directed.   Julissa Silver MD   blood glucose monitoring (NO BRAND SPECIFIED) meter device kit Use to test blood sugar 4 times daily or as directed.   Osbaldo Delacruz MD   blood glucose monitoring (NO BRAND SPECIFIED) test strip 1 strip by In Vitro route 4 times daily (before  meals and nightly) Use to test blood sugars 4 times daily or as directed   Osbaldo Delacruz MD   hydrOXYzine (ATARAX) 25 MG tablet Take 1 tablet (25 mg) by mouth 3 times daily for 5 days   Pieter Bruno MD   insulin pen needle (B-D U/F) 31G X 8 MM Use 1 pen needles daily or as directed.   Russ Ponce MD   ondansetron (ZOFRAN) 4 MG tablet Take 1 tablet (4 mg) by mouth every 8 hours as needed for nausea   Juanita Portillo MD   traMADol (ULTRAM) 50 MG tablet Take 1 tablet (50 mg) by mouth every 6 hours as needed for pain   Pieter Bruno MD   triamcinolone (KENALOG) 0.025 % cream Apply topically 2 times daily not started yet  Annamarie Garcia, PA-C         Medication history completed by: Luba Medeiros, Pharm.D IV Student

## 2018-09-10 NOTE — ED PROVIDER NOTES
[place] EMERGENCY DEPARTMENT ([facility])  9/09/18   History     Chief Complaint   Patient presents with     Abdominal Pain     Vomiting     HPI  Andrew Lockwood is a 50 year old male with a medical history significant for HIV/AIDS, type 2 diabetes mellitus, CNS toxoplasmosis and chronic abdominal pain.  Per review of patient's chart, patient was seen in the Emergency Department here yesterday complaining of upper abdominal pain, nausea and vomiting.  Patient had an RUQ ultrasound which was unremarkable.  At that time, patient's abdominal x-ray showed no obstructive bowel gas pattern.  Patient's laboratory studies showed a normal lactate, WBC of 8.35, hemoglobin of 15.5, platelets of 260,000, the rest were essentially unremarkable.  Patient was given IV normal saline, IV Zofran, GI cocktail and ibuprofen.  Patient felt improved after this and he was discharged to home with a prescription for Zofran.  Patient returns to the Emergency Department now for evaluation of abdominal pain, nausea and vomiting.      Reports symptoms recurred shortly after discharge.  Symptoms are identical to his prior presentation.  He denies any fevers or chills currently.  No chest pain or shortness of breath.  No trauma since he last was seen.    I have reviewed the Medications, Allergies, Past Medical and Surgical History, and Social History in the [facility] system.    Past Medical History:   Diagnosis Date     AIDS (H)      Allergic rhinitis due to other allergen     DNS     Chronic abdominal pain      CNS toxoplasmosis (H)      Diabetes type 2, controlled (H)      GERD (gastroesophageal reflux disease)      HIV (human immunodeficiency virus infection) (H)      Periungual wart      Sleep apnea     doesn't use CPAP       Past Surgical History:   Procedure Laterality Date     C NONSPECIFIC PROCEDURE      right forearm fracture     COLONOSCOPY Left 1/22/2016    Procedure: COMBINED COLONOSCOPY, SINGLE OR MULTIPLE BIOPSY/POLYPECTOMY BY  BIOPSY;  Surgeon: Clark Saini MD;  Location: [facility]     HC EXPLORE UNDESC TESTIS,INGUIN/SCROTAL       LAPAROSCOPIC APPENDECTOMY N/A 1/31/2018    Procedure: LAPAROSCOPIC APPENDECTOMY;  LAPAROSCOPIC APPENDECTOMY;  Surgeon: Dawn Holt MD;  [facility]     LAPAROSCOPY DIAGNOSTIC (GENERAL) N/A 7/26/2016    Procedure: LAPAROSCOPY DIAGNOSTIC (GENERAL);  Surgeon: Susannah Arriaga MD;  [facility]     LAPAROSCOPY DIAGNOSTIC (GENERAL) N/A 4/16/2018    Procedure: LAPAROSCOPY DIAGNOSTIC (GENERAL);  Diagnostic laparoscopy and lysis of adhesions;  Surgeon: Prince Dowling MD;  [facility]     OPTICAL TRACKING SYSTEM CRANIOTOMY, EXCISE TUMOR, COMBINED Left 4/10/2015    Procedure: COMBINED OPTICAL TRACKING SYSTEM CRANIOTOMY, EXCISE TUMOR;  Surgeon: Mirlande Colmenares MD;  [facility]     REPAIR GAMEKEEPER'S THUMB Right 12/2/2016    Procedure: REPAIR LIGAMENT ULNAR COLLATERAL THUMB (GAMEKEEPER'S);  Surgeon: Evin Zamorano MD;  [facility]       Family History   Problem Relation Age of Onset     Diabetes Brother      Diabetes Father      Alzheimer Disease Father      Unknown/Adopted Mother      Diabetes Paternal Grandfather      Cancer No family hx of      no skin cancer     Skin Cancer No family hx of      no famiy hx of skin cancer     Glaucoma No family hx of      Macular Degeneration No family hx of        Social History   Substance Use Topics     Smoking status: Current Every Day Smoker     Packs/day: 0.50     Types: Cigarettes     Last attempt to quit: 10/28/2016     Smokeless tobacco: Former User      Comment: quit Aug 2018     Alcohol use No      Comment: Last etoh in 2007         Review of Systems    14 point review of symptoms was performed and is negative except as noted above.       Physical Exam   BP: 140/88  Pulse: 93  Heart Rate: 74  Temp: 99.4  F (37.4  C)  Resp: 18  SpO2: 99 %      Physical Exam   GEN: Well appearing, non toxic, cooperative and  conversant.   HEENT: The head is normocephalic and atraumatic. Pupils are equal round and reactive to light. Extraocular motions are intact. There is no facial swelling. The neck is nontender and supple.   CV: Regular rate and rhythm without murmurs rubs or gallops. 2+ radial pulses bilaterally.  PULM: Clear to auscultation bilaterally.  ABD: Soft, right upper quadrant abdominal tenderness and right mid abdominal tenderness to palpation, nondistended.   EXT: Full range of motion.  No edema.  NEURO: Cranial nerves II through XII are intact and symmetric. Bilateral upper and lower extremities grossly show full range of motion without any focal deficits.   SKIN: No rashes, ecchymosis, or lacerations  PSYCH: Calm and cooperative, interactive.       ED Course     ED Course     Procedures               Labs Ordered and Resulted from Time of ED Arrival Up to the Time of Departure from the ED - No data to display         Assessments & Plan (with Medical Decision Making)   50-year-old male with complex past medical history including HIV AIDS, chronic abdominal pain returning for recurrence of abdominal pain.  He had a thorough workup during his prior visit including laboratory studies x-ray of the abdomen and a right upper quadrant ultrasound which have been unrevealing to the etiology of his pain.  No emergent process was identified    As the patient has a history of a bowel obstruction, and is returning with pain we elected to proceed with CT to definitively exclude this on the differential.  Ultimately, his CT as noted above was concerning for bowel obstruction.    He was discussed with the surgical service and ultimately admitted for further monitoring.  IV hydration provided, patient has been fairly comfortable, however continues to be tender in the right midabdomen.    Hemodynamically stable throughout my shift.  Signed out to oncoming attending awaiting bed placement.    I have reviewed the nursing notes.    I have  reviewed the findings, diagnosis, plan and need for follow up with the patient.    Current Discharge Medication List          Final diagnoses:   Small bowel obstruction       9/9/2018   [facility], [place], EMERGENCY DEPARTMENT     Chevy Bernal MD  09/12/18 0944

## 2018-09-10 NOTE — ED TRIAGE NOTES
Pt arrived for abdominal pain and vomiting. He had a BM yesterday at 2 pm. He was seen here this morning and given a GI cocktail. He is not able to keep water down.

## 2018-09-10 NOTE — PROGRESS NOTES
Observation status:  -diagnostic tests and consults completed and resulted - NOT METt, NGT placed based on CT results  -vital signs normal or at patient baseline - MET  -tolerating oral intake to maintain hydration - NOT MET, NPO  -adequate pain control on oral analgesics - NOT MET - IV morphine given   -returns to baseline functional status - MET, ambulating independently

## 2018-09-10 NOTE — CONSULTS
General Surgery Consult Note    Staff: Dr. Dowling  Date: 9/10/2018   Consulted for: SBO by Dr. Bernal    Assessment/Plan:  50 year old male with PMHx of HIV/AIDS, appendectomy, hx of pSBO s/p lap lysis of adhesion in April,  Presenting with N/V, abdominal pain and distention of 1 day concerning for SBO. CT mildly dilated small bowel, exam with mild tenderness to deep palpation of RLQ, afebrile, normal wbc, no signs of peritonitis     - Agree with admission to medicine  - Recommend Ng tube, would recommend GGC after ng decompression tomorrow       Kevin Santana MD  PGY-2 Surgery  pager [phone]    This note was edited to formulate the above plan, after the patient was seen and examined with chief Dr. Eason, discussed with staff Dr. Devin Villalobos MD  General Surgery Resident     ------------------------------------------  HPI:   Andrew Lockwood is a 50 year old male with PMHx of HIV/AIDS, DM2, CNS toxo, and chronic abdominal pain who is being evaluated for N/V, abdominal pain and CT scan with questionable pSBO.  Patient reports the nausea vomiting distention and belly pain began yesterday for which he came to the emergency department.  Workup was unremarkable with a reassuring right upper quadrant ultrasound and abdominal film showing a nonobstructive bowel gas pattern.  His lab values were also unremarkable.  He was given a GI cocktail Zofran and ibuprofen and was discharged home with prescription for Zofran.  Return back to the emergency department for reevaluation of that pain, nausea and vomiting.   He reports that this happens every several weeks without clear patterns of etiology. He denies recent narcotic use. He reports still passing flatus, but he has not passed a bowel since 2 days ago.  He denies fevers, chills, recent diarrhea, bloody bowel movements, chest pain, shortness of breath.  ?  Review of Systems:  ROS: 10 point ROS neg other than the symptoms noted above in the  "HPI.    PMH:  HIV/AIDS  Chronic abdominal pain  CNS toxoplasmosis  DM 2  GERD  Sleep apnea    PSHx:  Lap appendectomy (1/2018)  Multiple diagnostic laparoscopies (2016, 4/2018)    Medications:  Doxycycline  Bictegravir-emtricitab-tenofovir  Empagliflozin  Atarax  Lantus  Zofran  Prilosec  Tramadol    Allergies:   Metformin  Milk  Tylenol  Dulaglutide    SocHx:  Social History     Social History     Marital status: Single     Spouse name: N/A     Number of children: N/A     Years of education: N/A     Occupational History           5 years; temp agency     Social History Main Topics     Smoking status: Current Every Day Smoker     Packs/day: 0.50     Types: Cigarettes     Last attempt to quit: 10/28/2016     Smokeless tobacco: Former User      Comment: quit Aug 2018     Alcohol use No      Comment: Last etoh in 2007     Drug use: No      Comment: \"Not very often\"     Sexual activity: Not Currently     Partners: Female, Male      Comment: Last sexual activity 2008     Other Topics Concern     Not on file     Social History Narrative    Born in [facility].  [place] to the [place] in 1993.  Last traveled to visit family in 2008.            FamHx:  Family History   Problem Relation Age of Onset     Diabetes Brother      Diabetes Father      Alzheimer Disease Father      Unknown/Adopted Mother      Diabetes Paternal Grandfather      Cancer No family hx of      no skin cancer     Skin Cancer No family hx of      no famiy hx of skin cancer     Glaucoma No family hx of      Macular Degeneration No family hx of       Negative for bleeding disorders, clotting disorders, or problems with anesthesia    Physical Examination   /88  Pulse 93  Temp 99.4  F (37.4  C) (Oral)  SpO2 99%  General: Awake and alert. NAD  Pulm: non-labored breathing on room air, no tachypnea   CV: RRR  Abdomen: soft, non-tender, no guarding, very minimal tenderness in LLQ, no masses palpable, no peritoneal signs, no umbilical or " inguinal hernias  Musculoskel/Extremities: no edema, erythema, tenderness   Skin: no rashes, no diaphoresis and skin color normal     Labs: Reviewed     Imaging: Reviewed  CT A/P:  IMPRESSION: Mildly dilated loops of small bowel in the central abdomen  however the transition is not sharp, suggesting against high-grade  obstruction, but cannot exclude low-grade. Differential diagnosis  includes mild enteritis.

## 2018-09-10 NOTE — IP AVS SNAPSHOT
[address] [address]    Phone:  [phone]                                       After Visit Summary   9/10/2018    Andrew Lockwood    MRN: [id number]           After Visit Summary Signature Page     I have received my discharge instructions, and my questions have been answered. I have discussed any challenges I see with this plan with the nurse or doctor.    ..........................................................................................................................................  Patient/Patient Representative Signature      ..........................................................................................................................................  Patient Representative Print Name and Relationship to Patient    ..................................................               ................................................  Date                                   Time    ..........................................................................................................................................  Reviewed by Signature/Title    ...................................................              ..............................................  Date                                               Time          22EPIC Rev 08/18

## 2018-09-10 NOTE — ED NOTES
[facility]   ED Nurse to Floor Handoff     Andrew Lockwood is a 50 year old male who speaks English and lives alone,  in a shelter  They arrived in the ED by walking from shelter    ED Chief Complaint: Abdominal Pain and Vomiting    ED Dx;   Final diagnoses:   Small bowel obstruction         Needed?: No    Allergies:   Allergies   Allergen Reactions     Metformin      Abdominal pain     Milk [Lac Bovis] Hives     Tylenol [Acetaminophen] Itching     Dulaglutide Rash   .  Past Medical Hx:   Past Medical History:   Diagnosis Date     AIDS (H)      Allergic rhinitis due to other allergen     DNS     Chronic abdominal pain      CNS toxoplasmosis (H)      Diabetes type 2, controlled (H)      GERD (gastroesophageal reflux disease)      HIV (human immunodeficiency virus infection) (H)      Periungual wart      Sleep apnea     doesn't use CPAP      Baseline Mental status: WDL  Current Mental Status changes: at basesline    Infection present or suspected this encounter: no  Sepsis suspected: No  Isolation type: No active isolations     Activity level - Baseline/Home:  Independent  Activity Level - Current:   Independent    Bariatric equipment needed?: No    In the ED these meds were given: Medications - No data to display    Drips running?  No    Home pump  No    Current LDAs  Wound 06/17/18 Lateral;Right Leg Other (comment) (Active)   Number of days:85       Incision/Surgical Site 01/31/18 Umbilicus (Active)   Number of days:222       Incision/Surgical Site 01/31/18 Left;Lower Abdomen (Active)   Number of days:222       Incision/Surgical Site 04/16/18 Mid Abdomen (Active)   Number of days:147       Incision/Surgical Site 04/16/18 Left Abdomen (Active)   Number of days:147       Incision/Surgical Site 04/16/18 Left Abdomen (Active)   Number of days:147       Labs results: Labs Ordered and Resulted from Time of ED Arrival Up to the Time of Departure from the ED - No data to  display    Imaging Studies:   Recent Results (from the past 24 hour(s))   Abdomen US, limited (RUQ only)    Narrative    EXAMINATION: Limited Abdominal Ultrasound, 9/9/2018 7:48 AM     COMPARISON: Ultrasound 12/20/2016    HISTORY: Umbilical pain with vomiting    FINDINGS:   Fluid: No evidence of ascites or pleural effusions.    Liver: The liver demonstrates normal echotexture, measuring 16.5 cm in  craniocaudal dimension. There is no focal mass.     Gallbladder: There is no wall thickening, pericholecystic fluid,  positive sonographic Mendes's sign or evidence for cholelithiasis.    Bile Ducts: Both the intra- and extrahepatic biliary system are of  normal caliber.  The common bile duct measures 2.5 mm in diameter.    Pancreas: Obscured by bowel gas.     Kidney: The right kidney measures 10.7 cm long. There is no  hydronephrosis or hydroureter, no shadowing renal calculi, cystic  lesion or mass.       Impression    IMPRESSION:   1.  Normal right upper quadrant ultrasound.    I have personally reviewed the examination and initial interpretation  and I agree with the findings.    DRE HIGGINS MD   XR Abdomen 2 Views    Narrative    EXAM: XR ABDOMEN 2 VW  9/9/2018 8:08 AM      HISTORY: abdl pain vomiting;     COMPARISON: Ultrasound 9/9/2018    FINDINGS: Single air-fluid level on upright radiograph, otherwise  nonobstructive bowel gas pattern. No pneumatosis. No portal venous  gas. Visualized portions of the lung demonstrate no focal airspace  opacities.       Impression    IMPRESSION: Nonobstructive bowel gas pattern.  Large stool burden.    I have personally reviewed the examination and initial interpretation  and I agree with the findings.    DRE HIGGINS MD   CT Abdomen Pelvis w/o Contrast    Narrative    Preliminary report:  This is a preliminary resident interpretation. Full report to follow.         Impression    IMPRESSION: Mildly dilated loops of small bowel in the central abdomen  however the  transition is not sharp, suggesting against high-grade  obstruction, but cannot exclude low-grade. Differential diagnosis  includes mild enteritis.        Recent vital signs:   /88  Pulse 93  Temp 99.4  F (37.4  C) (Oral)  SpO2 99%    Cardiac Rhythm: Other  Pt needs tele? No  Skin/wound Issues: None    Code Status: Full Code    Pain control: good    Nausea control: pt had none    Abnormal labs/tests/findings requiring intervention: CT scan results above    Family present during ED course? No   Family Comments/Social Situation comments: pt is being admitted for a bowel obstruction. He has been resting and sleeping in his bed since he got here.     Tasks needing completion: None    Reema Mercado RN  [facility]-- [phone] [facility]  [phone] [facility] ED

## 2018-09-10 NOTE — IP AVS SNAPSHOT
MRN:[id number]                      After Visit Summary   9/10/2018    Andrew Lockwood    MRN: [id number]           Thank you!     Thank you for choosing [facility] for your care. Our goal is always to provide you with excellent care. Hearing back from our patients is one way we can continue to improve our services. Please take a few minutes to complete the written survey that you may receive in the mail after you visit with us. Thank you!        Patient Information     Date Of Birth          11/27/1967        About your hospital stay     You were admitted on:  September 10, 2018 You last received care in the:  [address] [place] [address]    You were discharged on:  September 13, 2018        Reason for your hospital stay       You were admitted for a small bowel obstruction. You were treated conservatively with a tube down your nose and bowel rest.                  Who to Call     For medical emergencies, please call 911.  For non-urgent questions about your medical care, please call your primary care provider or clinic, None          Attending Provider     Provider Specialty    Chevy Bernal MD Emergency Medicine    Zaire Oropeza MD Internal Medicine    Juanis Glover MD Emergency Medicine    Donald, MD Connie Internal Medicine    Renata Alberto DO Internal Medicine       Primary Care Provider Fax #    Physician No Ref-Primary [phone]       When to contact your care team       Call your primary doctor if you have any of the following: nausea, vomiting, abdominal pain.                  After Care Instructions     Activity       Your activity upon discharge: activity as tolerated            Diet       Follow this diet upon discharge: Moderate Consistent CHO Diet            Monitor and record       blood glucose 4 times a day, before meals and at bedtime and notify md for blood glucose less than 90 or greater than 250                  Follow-up Appointments     [facility]ult [facility] Follow-up and recommended  labs and tests       Follow up with primary care provider, Physician Dr. Membreno at your next scheduled visit on Oct 8, for hospital follow- up. The following labs/tests are recommended: Glucose monitoring.     Appointments on [place] and/or [facility] (with [facility] or [facility] provider or service). Call [phone] if you haven't heard regarding these appointments within 7 days of discharge.                  Your next 10 appointments already scheduled     Oct 08, 2018  1:00 PM CDT   (Arrive by 12:45 PM)   Return Visit with Rekha Membreno MD   [facility] ([facility])    [address]  4th Floor  [facility] [address] [phone]              Pending Results     No orders found from 9/8/2018 to 9/11/2018.            Statement of Approval     Ordered          09/13/18 0956  I have reviewed and agree with all the recommendations and orders detailed in this document.  EFFECTIVE NOW     Approved and electronically signed by:  Nichelle Flannery PA             Admission Information     Date & Time Provider Department Dept. Phone    9/10/2018 Renata Alberto DO [address] [facility] [place] [phone]      Your Vitals Were     Blood Pressure Pulse Temperature Respirations Pulse Oximetry       117/75 (BP Location: Right arm) 84 97.6  F (36.4  C) (Oral) 16 96%       MyChart Information     [employer] gives you secure access to your electronic health record. If you see a primary care provider, you can also send messages to your care team and make appointments. If you have questions, please call your primary care clinic.  If you do not have a primary care provider, please call [phone] and they will assist you.        Care EveryWhere ID     This is your Care EveryWhere ID. This could be used by other organizations to access your [facility] medical records  [id number]        Equal Access to Services     RA JORDAN : maverick Gruber qaybta  dez mcdowell amadatenzin noelecho rgaan ah. [facility] [phone].    ATENCIÓN: Si mac piedra, tiene a kohli disposición servicios gratuitos de asistencia lingüística. Llame al [phone].    We comply with applicable federal civil rights laws and [place] laws. We do not discriminate on the basis of race, color, national origin, age, disability, sex, sexual orientation, or gender identity.               Review of your medicines      CONTINUE these medicines which have NOT CHANGED        Dose / Directions    Bictegravir-Emtricitab-Tenofov -25 MG Tabs   Used for:  HIV (human immunodeficiency virus infection) (H)        Dose:  1 tablet   Take 1 tablet by mouth daily   Quantity:  30 tablet   Refills:  11       blood glucose lancets standard   Commonly known as:  no brand specified   Used for:  Type 2 diabetes mellitus without complication, without long-term current use of insulin (H)        Use to test blood sugar four times daily or as directed.   Quantity:  100 Box   Refills:  5       blood glucose monitoring meter device kit   Commonly known as:  no brand specified   Used for:  Type 2 diabetes mellitus with complication, without long-term current use of insulin (H)        Use to test blood sugar 4 times daily or as directed.   Quantity:  1 kit   Refills:  0       blood glucose monitoring test strip   Commonly known as:  no brand specified   Used for:  Type 2 diabetes mellitus with complication, without long-term current use of insulin (H)        Dose:  1 strip   1 strip by In Vitro route 4 times daily (before meals and nightly) Use to test blood sugars 4 times daily or as directed   Quantity:  100 strip   Refills:  11       empagliflozin 10 MG Tabs tablet   Commonly known as:  JARDIANCE   Used for:  Diabetes mellitus, type 2 (H)        Take 1 tablet by mouth daily   Quantity:  90 tablet   Refills:  3       insulin pen needle 31G X 8 MM   Commonly known as:  B-D U/F   Used for:  Type 2  diabetes mellitus without complication, without long-term current use of insulin (H)        Use 1 pen needles daily or as directed.   Quantity:  100 each   Refills:  0       LANTUS SOLOSTAR 100 UNIT/ML injection   Used for:  Type 2 diabetes mellitus with hyperglycemia, without long-term current use of insulin (H)   Generic drug:  insulin glargine        Dose:  50 Units   Inject 50 Units Subcutaneous every morning   Quantity:  15 mL   Refills:  3       omeprazole 20 MG CR capsule   Commonly known as:  priLOSEC        Dose:  20 mg   Take 1 capsule (20 mg) by mouth daily   Quantity:  30 capsule   Refills:  1       ondansetron 4 MG tablet   Commonly known as:  ZOFRAN        Dose:  4 mg   Take 1 tablet (4 mg) by mouth every 8 hours as needed for nausea   Quantity:  10 tablet   Refills:  0       triamcinolone 0.025 % cream   Commonly known as:  KENALOG   Used for:  Folliculitis        Apply topically 2 times daily   Quantity:  15 g   Refills:  0         STOP taking     doxycycline 100 MG capsule   Commonly known as:  VIBRAMYCIN           hydrOXYzine 25 MG tablet   Commonly known as:  ATARAX           traMADol 50 MG tablet   Commonly known as:  ULTRAM                    Protect others around you: Learn how to safely use, store and throw away your medicines at www.disposemymeds.org.             Medication List: This is a list of all your medications and when to take them. Check marks below indicate your daily home schedule. Keep this list as a reference.      Medications           Morning Afternoon Evening Bedtime As Needed    Bictegravir-Emtricitab-Tenofov -25 MG Tabs   Take 1 tablet by mouth daily   Last time this was given:  1 tablet on 9/13/2018  8:30 AM                                blood glucose lancets standard   Commonly known as:  no brand specified   Use to test blood sugar four times daily or as directed.                                blood glucose monitoring meter device kit   Commonly known as:  no  brand specified   Use to test blood sugar 4 times daily or as directed.                                blood glucose monitoring test strip   Commonly known as:  no brand specified   1 strip by In Vitro route 4 times daily (before meals and nightly) Use to test blood sugars 4 times daily or as directed                                empagliflozin 10 MG Tabs tablet   Commonly known as:  JARDIANCE   Take 1 tablet by mouth daily                                insulin pen needle 31G X 8 MM   Commonly known as:  B-D U/F   Use 1 pen needles daily or as directed.                                LANTUS SOLOSTAR 100 UNIT/ML injection   Inject 50 Units Subcutaneous every morning   Last time this was given:  10 Units on 9/12/2018  9:31 PM   Generic drug:  insulin glargine                                omeprazole 20 MG CR capsule   Commonly known as:  priLOSEC   Take 1 capsule (20 mg) by mouth daily                                ondansetron 4 MG tablet   Commonly known as:  ZOFRAN   Take 1 tablet (4 mg) by mouth every 8 hours as needed for nausea                                triamcinolone 0.025 % cream   Commonly known as:  KENALOG   Apply topically 2 times daily
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
## 2018-09-11 ENCOUNTER — APPOINTMENT (OUTPATIENT)
Dept: GENERAL RADIOLOGY | Facility: CLINIC | Age: 53
End: 2018-09-11
Payer: COMMERCIAL

## 2018-09-11 LAB
ALBUMIN SERPL-MCNC: 3.1 G/DL (ref 3.4–5)
ALP SERPL-CCNC: 91 U/L (ref 40–150)
ALT SERPL W P-5'-P-CCNC: 28 U/L (ref 0–70)
ANION GAP SERPL CALCULATED.3IONS-SCNC: 7 MMOL/L (ref 3–14)
AST SERPL W P-5'-P-CCNC: 19 U/L (ref 0–45)
BILIRUB SERPL-MCNC: 0.6 MG/DL (ref 0.2–1.3)
BUN SERPL-MCNC: 10 MG/DL (ref 7–30)
CALCIUM SERPL-MCNC: 8.3 MG/DL (ref 8.5–10.1)
CHLORIDE SERPL-SCNC: 108 MMOL/L (ref 94–109)
CO2 SERPL-SCNC: 24 MMOL/L (ref 20–32)
CREAT SERPL-MCNC: 0.68 MG/DL (ref 0.66–1.25)
ERYTHROCYTE [DISTWIDTH] IN BLOOD BY AUTOMATED COUNT: 13.3 % (ref 10–15)
GFR SERPL CREATININE-BSD FRML MDRD: >90 ML/MIN/1.7M2
GLUCOSE BLDC GLUCOMTR-MCNC: 141 MG/DL (ref 70–99)
GLUCOSE BLDC GLUCOMTR-MCNC: 153 MG/DL (ref 70–99)
GLUCOSE BLDC GLUCOMTR-MCNC: 213 MG/DL (ref 70–99)
GLUCOSE BLDC GLUCOMTR-MCNC: 79 MG/DL (ref 70–99)
GLUCOSE BLDC GLUCOMTR-MCNC: 94 MG/DL (ref 70–99)
GLUCOSE BLDC GLUCOMTR-MCNC: 98 MG/DL (ref 70–99)
GLUCOSE SERPL-MCNC: 93 MG/DL (ref 70–99)
HCT VFR BLD AUTO: 40.5 % (ref 40–53)
HGB BLD-MCNC: 13.6 G/DL (ref 13.3–17.7)
MAGNESIUM SERPL-MCNC: 2.1 MG/DL (ref 1.6–2.3)
MCH RBC QN AUTO: 30.5 PG (ref 26.5–33)
MCHC RBC AUTO-ENTMCNC: 33.6 G/DL (ref 31.5–36.5)
MCV RBC AUTO: 91 FL (ref 78–100)
PHOSPHATE SERPL-MCNC: 2.4 MG/DL (ref 2.5–4.5)
PLATELET # BLD AUTO: 208 10E9/L (ref 150–450)
POTASSIUM SERPL-SCNC: 3.4 MMOL/L (ref 3.4–5.3)
PROT SERPL-MCNC: 6.6 G/DL (ref 6.8–8.8)
RBC # BLD AUTO: 4.46 10E12/L (ref 4.4–5.9)
SODIUM SERPL-SCNC: 139 MMOL/L (ref 133–144)
WBC # BLD AUTO: 6.1 10E9/L (ref 4–11)

## 2018-09-11 PROCEDURE — 99207 ZZC APP CREDIT; MD BILLING SHARED VISIT: CPT | Performed by: PHYSICIAN ASSISTANT

## 2018-09-11 PROCEDURE — 36415 COLL VENOUS BLD VENIPUNCTURE: CPT | Performed by: NURSE PRACTITIONER

## 2018-09-11 PROCEDURE — 25000132 ZZH RX MED GY IP 250 OP 250 PS 637: Performed by: PHYSICIAN ASSISTANT

## 2018-09-11 PROCEDURE — 25000131 ZZH RX MED GY IP 250 OP 636 PS 637: Performed by: PHYSICIAN ASSISTANT

## 2018-09-11 PROCEDURE — 25000128 H RX IP 250 OP 636: Performed by: INTERNAL MEDICINE

## 2018-09-11 PROCEDURE — 85027 COMPLETE CBC AUTOMATED: CPT | Performed by: NURSE PRACTITIONER

## 2018-09-11 PROCEDURE — 84100 ASSAY OF PHOSPHORUS: CPT | Performed by: NURSE PRACTITIONER

## 2018-09-11 PROCEDURE — 74018 RADEX ABDOMEN 1 VIEW: CPT

## 2018-09-11 PROCEDURE — 12000001 ZZH R&B MED SURG/OB UMMC

## 2018-09-11 PROCEDURE — 80053 COMPREHEN METABOLIC PANEL: CPT | Performed by: NURSE PRACTITIONER

## 2018-09-11 PROCEDURE — 00000146 ZZHCL STATISTIC GLUCOSE BY METER IP

## 2018-09-11 PROCEDURE — 83735 ASSAY OF MAGNESIUM: CPT | Performed by: NURSE PRACTITIONER

## 2018-09-11 PROCEDURE — 25000128 H RX IP 250 OP 636: Performed by: NURSE PRACTITIONER

## 2018-09-11 RX ORDER — DEXTROSE MONOHYDRATE 25 G/50ML
25-50 INJECTION, SOLUTION INTRAVENOUS
Status: DISCONTINUED | OUTPATIENT
Start: 2018-09-11 | End: 2018-09-13 | Stop reason: HOSPADM

## 2018-09-11 RX ORDER — MORPHINE SULFATE 2 MG/ML
1-2 INJECTION, SOLUTION INTRAMUSCULAR; INTRAVENOUS
Status: DISCONTINUED | OUTPATIENT
Start: 2018-09-11 | End: 2018-09-11

## 2018-09-11 RX ORDER — HYDROXYZINE HYDROCHLORIDE 10 MG/1
10 TABLET, FILM COATED ORAL 3 TIMES DAILY PRN
Status: DISCONTINUED | OUTPATIENT
Start: 2018-09-11 | End: 2018-09-13 | Stop reason: HOSPADM

## 2018-09-11 RX ORDER — MORPHINE SULFATE 2 MG/ML
1-2 INJECTION, SOLUTION INTRAMUSCULAR; INTRAVENOUS EVERY 4 HOURS PRN
Status: DISCONTINUED | OUTPATIENT
Start: 2018-09-11 | End: 2018-09-11

## 2018-09-11 RX ORDER — MORPHINE SULFATE 2 MG/ML
1-2 INJECTION, SOLUTION INTRAMUSCULAR; INTRAVENOUS EVERY 6 HOURS PRN
Status: DISCONTINUED | OUTPATIENT
Start: 2018-09-11 | End: 2018-09-12

## 2018-09-11 RX ORDER — NICOTINE POLACRILEX 4 MG
15-30 LOZENGE BUCCAL
Status: DISCONTINUED | OUTPATIENT
Start: 2018-09-11 | End: 2018-09-13 | Stop reason: HOSPADM

## 2018-09-11 RX ADMIN — SODIUM CHLORIDE: 9 INJECTION, SOLUTION INTRAVENOUS at 19:57

## 2018-09-11 RX ADMIN — MORPHINE SULFATE 2 MG: 2 INJECTION, SOLUTION INTRAMUSCULAR; INTRAVENOUS at 04:46

## 2018-09-11 RX ADMIN — SODIUM CHLORIDE: 9 INJECTION, SOLUTION INTRAVENOUS at 06:28

## 2018-09-11 RX ADMIN — HYDROXYZINE HYDROCHLORIDE 10 MG: 10 TABLET ORAL at 13:47

## 2018-09-11 RX ADMIN — MORPHINE SULFATE 2 MG: 2 INJECTION, SOLUTION INTRAMUSCULAR; INTRAVENOUS at 00:27

## 2018-09-11 RX ADMIN — INSULIN GLARGINE 10 UNITS: 100 INJECTION, SOLUTION SUBCUTANEOUS at 07:44

## 2018-09-11 RX ADMIN — MORPHINE SULFATE 2 MG: 2 INJECTION, SOLUTION INTRAMUSCULAR; INTRAVENOUS at 07:44

## 2018-09-11 RX ADMIN — INSULIN ASPART 1 UNITS: 100 INJECTION, SOLUTION INTRAVENOUS; SUBCUTANEOUS at 00:26

## 2018-09-11 ASSESSMENT — ACTIVITIES OF DAILY LIVING (ADL)
ADLS_ACUITY_SCORE: 10

## 2018-09-11 ASSESSMENT — PAIN DESCRIPTION - DESCRIPTORS
DESCRIPTORS: ACHING
DESCRIPTORS: ACHING;CONSTANT
DESCRIPTORS: ACHING;CONSTANT

## 2018-09-11 NOTE — PLAN OF CARE
Problem: Patient Care Overview  Goal: Plan of Care/Patient Progress Review  Outcome: No Change  VSS.  Abd distended and tender.  Hypo bowel sounds, passing flatus but no stool yet.  NG to LIS- 300 ml out this shift.  Getting IV morphine Q 3-4 hours when available with only minimal relief.  UAL.  Amb long distances in halls tonight.  Voiding, not always saving.  Need gastrografin placed in NG tube and clamped this am.  Waiting on contrast arrival to unit.  Encourage continued ambulations and OOB.  Monitor pain control.
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
## 2018-09-11 NOTE — PROGRESS NOTES
GENERAL SURGERY PROGRESS NOTE  09/11/2018    Patient: Andrew Lockwood  MRN: [id number]    Subjective  No acute overnight events. Pain controlled. Reports nausea. Voiding independently. +flatus/no BM. No fever. Ambulated.     Objective  Temp:  [96.1  F (35.6  C)-97.4  F (36.3  C)] 96.1  F (35.6  C)  Heart Rate:  [62-71] 62  Resp:  [18-19] 18  BP: (106-120)/(65-68) 120/68  SpO2:  [96 %-97 %] 96 %    General: AAOx4, NAD, lying comfortably in bed  CV: Non cyanotic   Pulm: Breathing comfortably  Abd: Soft, non-distended, mildly tender to palpation bladimir LLQ, no guarding, non peritonitic   Extremities: moving all extremities spontaneously without apparent deficit      Intake/Output Summary (Last 24 hours) at 09/11/18 1318  Last data filed at 09/11/18 1145   Gross per 24 hour   Intake          1754.17 ml   Output             1375 ml   Net           379.17 ml        Labs:    Recent Labs  Lab 09/11/18  0650 09/09/18  0816   WBC 6.1 8.4   HGB 13.6 15.5    260       Recent Labs  Lab 09/11/18  0650 09/09/18  0816    138   POTASSIUM 3.4 3.4   CHLORIDE 108 105   CO2 24 26   BUN 10 11   CR 0.68 0.78   GLC 93 158*   LINDA 8.3* 9.1   MAG 2.1 2.3   PHOS 2.4*  --        Recent Labs  Lab 09/11/18  0650 09/09/18  0816   AST 19 20   ALT 28 33   ALKPHOS 91 128   BILITOTAL 0.6 0.7   ALBUMIN 3.1* 3.8       Imaging:  CT AP 9/10  IMPRESSION: Mildly dilated loops of small bowel in the central abdomen  however with gentle transition, suggesting against high-grade small  bowel obstruction. Other considerations include an adynamic ileus and  mild enteritis.     Assessment & Plan  Andrew Lockwood is a 50 year old male with PMHx of HIV/AIDS, appendectomy, hx of pSBO s/p lap lysis of adhesion in April, presenting with N/V, abdominal pain and distention of 1 day, SBO on CT. Afebrile, normal wbc, no signs of peritonitis. Will manage conservatively.     -Bowel rest, NPO, IVF, NG tube  -Gastrografin challenge     Patient seen and discussed with  chief resident and will be discussed with staff.  Jaent Chow MD  Surgery PGY1

## 2018-09-11 NOTE — PLAN OF CARE
Problem: Bowel Obstruction (Adult)  Goal: Signs and Symptoms of Listed Potential Problems Will be Absent, Minimized or Managed (Bowel Obstruction)  Signs and symptoms of listed potential problems will be absent, minimized or managed by discharge/transition of care (reference Bowel Obstruction (Adult) CPG).   Outcome: No Change  VSS. Pt up ad lexi. Voids spont, not saving. Pt had one large loose BM. Gastrografin Xray scheduled for 1530. NG to LIS with moderate amount of clear output. Pain controlled with IV morphine X1. Atarax given X1 for anxiety. No insulin given per sliding scale. At 1215 Pt BG 96, Pt stated he felt weak and sweaty and wanted to eat. Pt advised of NPO status. Pt then walked to unit 7A and expressed concerns to the charge nurse who rechecked Pt's BG, 141. Pt educated on the need to remain NPO until cleared by MD. [place] POC.

## 2018-09-11 NOTE — PLAN OF CARE
Problem: Bowel Obstruction (Adult)  Goal: Signs and Symptoms of Listed Potential Problems Will be Absent, Minimized or Managed (Bowel Obstruction)  Signs and symptoms of listed potential problems will be absent, minimized or managed by discharge/transition of care (reference Bowel Obstruction (Adult) CPG).   Outcome: No Change  /66 (BP Location: Right arm)  Pulse 77  Temp 97.4  F (36.3  C) (Oral)  Resp 18  SpO2 97%    Pain managed with IV morphine. MIVF infusing, restarted at 1930 due to pt being off the floor and he disconnected himself without the nurse knowing. Abd soft, passing gas, No BM. NPO. Up ad lexi. BG q4h, gave lantus but not novolog due to pen not being up on floor, asked pharmacy for one.

## 2018-09-12 LAB
ANION GAP SERPL CALCULATED.3IONS-SCNC: 5 MMOL/L (ref 3–14)
BUN SERPL-MCNC: 6 MG/DL (ref 7–30)
CALCIUM SERPL-MCNC: 8.5 MG/DL (ref 8.5–10.1)
CHLORIDE SERPL-SCNC: 108 MMOL/L (ref 94–109)
CO2 SERPL-SCNC: 25 MMOL/L (ref 20–32)
CREAT SERPL-MCNC: 0.68 MG/DL (ref 0.66–1.25)
GFR SERPL CREATININE-BSD FRML MDRD: >90 ML/MIN/1.7M2
GLUCOSE BLDC GLUCOMTR-MCNC: 134 MG/DL (ref 70–99)
GLUCOSE BLDC GLUCOMTR-MCNC: 142 MG/DL (ref 70–99)
GLUCOSE BLDC GLUCOMTR-MCNC: 146 MG/DL (ref 70–99)
GLUCOSE BLDC GLUCOMTR-MCNC: 182 MG/DL (ref 70–99)
GLUCOSE BLDC GLUCOMTR-MCNC: 214 MG/DL (ref 70–99)
GLUCOSE SERPL-MCNC: 145 MG/DL (ref 70–99)
POTASSIUM SERPL-SCNC: 3.4 MMOL/L (ref 3.4–5.3)
SODIUM SERPL-SCNC: 139 MMOL/L (ref 133–144)

## 2018-09-12 PROCEDURE — 00000146 ZZHCL STATISTIC GLUCOSE BY METER IP

## 2018-09-12 PROCEDURE — 99207 ZZC APP CREDIT; MD BILLING SHARED VISIT: CPT | Performed by: PHYSICIAN ASSISTANT

## 2018-09-12 PROCEDURE — 99232 SBSQ HOSP IP/OBS MODERATE 35: CPT | Performed by: INTERNAL MEDICINE

## 2018-09-12 PROCEDURE — 36415 COLL VENOUS BLD VENIPUNCTURE: CPT | Performed by: PHYSICIAN ASSISTANT

## 2018-09-12 PROCEDURE — 80048 BASIC METABOLIC PNL TOTAL CA: CPT | Performed by: PHYSICIAN ASSISTANT

## 2018-09-12 PROCEDURE — 12000001 ZZH R&B MED SURG/OB UMMC

## 2018-09-12 PROCEDURE — 25000128 H RX IP 250 OP 636: Performed by: NURSE PRACTITIONER

## 2018-09-12 RX ORDER — OXYCODONE HYDROCHLORIDE 5 MG/1
5 TABLET ORAL EVERY 6 HOURS PRN
Status: DISCONTINUED | OUTPATIENT
Start: 2018-09-12 | End: 2018-09-13 | Stop reason: HOSPADM

## 2018-09-12 RX ADMIN — SODIUM CHLORIDE: 9 INJECTION, SOLUTION INTRAVENOUS at 06:28

## 2018-09-12 ASSESSMENT — ACTIVITIES OF DAILY LIVING (ADL)
ADLS_ACUITY_SCORE: 10

## 2018-09-12 NOTE — PROGRESS NOTES
9/12/2018:    Social Work Services Progress Note    Hospital Day: 3  Collaborated with:  Patient, while out walking in the hallway    Data:    Patient is homeless and living at the tent camp along [facility] currently and plans to return there.    Intervention:    Checked in with patient offering to discuss resources. Patient lightheartedly stated that he would like a house/home and joked about this writer's inability to procure this for him. Patient matter of fact about returning to [facility] as tent was provided to him. Patient then joked with the nurse about getting some real food.     Additional resources are needed, but chronic need like housing and financial resources. Patient does have some connections with clinics and case management.    Assessment:  Patient interested in addtional resources, if possible, understanding of limited availability    Plan:    Anticipated Disposition:  Homeless, likely to return to tent camp in [place]    Barriers to d/c plan:  Medical clearance    Follow Up:  SW to follow- additional resources if possible, though patient understanding of barriers    Geovanna Vázquez, RADHA, LGSW  ICU 4A, 4C, 4E   Ph: [phone]

## 2018-09-12 NOTE — CONSULTS
Social Work Services Progress Note    Hospital Day: 3  Date of Initial Social Work Evaluation:  Not complete  Collaborated with:  Medical Team, RN    Data:  SW was consulted to meet with Pt for discharge planning due to his homelessness and ongoing medical needs.    Intervention:  Writer spoke with Pt's team to clarify current care needs. Pt will be discharged on his usual insulin, but no new nursing care needs. Per chart, Pt is homeless and has been living with a friend in their car to avoid shelters. Pt has case management through  [employer] and is actively working on housing.     Writer attempted to meet with Pt to assess his current situation and ensure that his former resources are still in place, however Pt was not in the room. Writer spoke with RN who states that Pt has been out of the room for an hour and his whereabouts are unknown. Pt did leave his belongings in the room, which indicates and intent to return. SW will make another attempt to meet with Pt in the morning.    Assessment:  Pt may need additional resources due to homelessness.    Plan:    Anticipated Disposition:  Likely to return to the streets/living in his car.    Barriers to d/c plan:  Medical clearance    Follow Up:  SW to try to meet with Pt again to assess need for additional resources.      Melissa Hayes, [facility]  ICU   Pager: [phone]

## 2018-09-12 NOTE — PROGRESS NOTES
"[facility], [place]    Internal Medicine Progress Note - Gold Service      Assessment & Plan   Andrew Lockwood is a 50 year old man with a history of HIV/AIDS on HAART diagnosed after presenting w/ CNS toxoplasmosis, DM II, appendectomy, and partial SBO s/p lap lysis of adhesions in April who is admitted w/ recurrent SBO.     #Partial SBO. Presented w/ N/V, abdominal pain, and distension x 1 day. CT w/ mildly dilated loops of small bowel w/ gentle transition suggesting against high-grade obstruction. NG tube removed yesterday. Gastrografin yesterday with contrast in rectum. Tolerating clear liquid diet with some \"heart burn.\" Had bowel movement today, per patient.     - General surgery following w/o plan for intervention at this time, conservative management   - Advance diet to full liquids, okay to advance to diabetic diet if tolerating full liquids  - Patient not requiring any pain medication thus far today. Will have oral oxycodone available for pain but limit opioids as able.    - PRN Zofran  - Hold on oral bowel regimen in the setting of SBO      #HIV/AIDS. Diagnosed in 2015. Reports excellent med adherence since switching to Biktarvy in July. Remains homeless; residing in shelter. Followed in ID clinic.   - Continue HAART  - Will need refill for HAART on discharge for 9/14 dose     #DM II. HgbA1c 8.7% in June. Last . Reports using 50 units of Lantus and Jardiance daily as OP. Not reliably checking BG. Blood glucose  over last 24 hours. Fasting glucose this .     - Hold Lantus and monitor glucose. Goal to avoid hypoglycemia  - AC and HS GB checks and \"medium\" Novolog correction scale   - Hold home Jardiance  - Diabetic diet     Diet: Combination Diet 3839-3362 Calories: Moderate Consistent CHO (4-6 CHO units/meal); Full Liquid Diet  Fluids: discontinue as he is tolerating PO   Potts Catheter: not present    DVT Prophylaxis: Mechanical/Ambulate every shift - otherwise " "will need pharmacologic prophylaxis   Code Status: Full Code    Disposition Plan   Expected discharge: tomorrow, recommended to prior living arrangement once tolerating PO and SBO resolved.       The patient's care was discussed with the Attending Physician, Dr. Alberto and Bedside Nurse.    HAO Marie  Internal Medicine Staff Hospitalist Service  [facility]  Pager: [phone]  Please see sticky note for cross cover information    Interval History   Andrew is feeling okay today. Had bowel movement today. Pain persistent, however not requiring pain medication today. NG tube out yesterday, tolerating clear liquid diet with mild \"heart burn.\"      A 4 point ROS was performed (including CV, Resp, GI, ) and negative unless otherwise noted in HPI.     Data reviewed today: I reviewed all medications, new labs and imaging results over the last 24 hours. I personally reviewed no images or EKG's today.    Physical Exam   Vital Signs:   Blood pressure 118/78, pulse 77, temperature 96.7  F (35.9  C), temperature source Oral, resp. rate 16, SpO2 99 %.  GENERAL: Alert and oriented x 3.  HEENT: Anicteric sclera. PERRL. Mucous membranes moist and without lesions.   CV: RRR. S1, S2. No murmurs appreciated.   RESPIRATORY: Effort normal on RA. Lungs CTAB with no wheezing, rales, rhonchi.   GI: Abdomen soft and non distended, bowel sounds present. Tenderness with palpation to right lower quadrant, no rebound or guarding.   MUSCULOSKELETAL: No joint swelling or tenderness. Moves all extremities.   NEUROLOGICAL: No focal deficits. Strength 5/5 bilaterally in upper and lower extremities.   EXTREMITIES: No peripheral edema. Intact bilateral pedal pulses.   SKIN: No jaundice. No rashes.         Data   Medications     sodium chloride 125 mL/hr at 09/12/18 0628       Bictegravir-Emtricitab-Tenofov  1 tablet Oral Daily     insulin aspart  1-7 Units Subcutaneous TID AC     insulin aspart  1-5 Units Subcutaneous " At Bedtime     Data     Recent Labs  Lab 09/12/18  0712 09/11/18  0650 09/09/18  0816   WBC  --  6.1 8.4   HGB  --  13.6 15.5   MCV  --  91 90   PLT  --  208 260    139 138   POTASSIUM 3.4 3.4 3.4   CHLORIDE 108 108 105   CO2 25 24 26   BUN 6* 10 11   CR 0.68 0.68 0.78   ANIONGAP 5 7 7   LINDA 8.5 8.3* 9.1   * 93 158*   ALBUMIN  --  3.1* 3.8   PROTTOTAL  --  6.6* 8.0   BILITOTAL  --  0.6 0.7   ALKPHOS  --  91 128   ALT  --  28 33   AST  --  19 20   LIPASE  --   --  239

## 2018-09-12 NOTE — PLAN OF CARE
Problem: Patient Care Overview  Goal: Plan of Care/Patient Progress Review  Outcome: Improving  Abdomen rounded. Passing flatus, no BM overnight. Denies pain and nausea. NS infusing at 125 ml/hr. Voiding spontaneously. On clear liquid diet. BG ac and hs, 0200 214. Up ad lexi. VSS on room air. Slept well overnight.

## 2018-09-12 NOTE — PROGRESS NOTES
GENERAL SURGERY PROGRESS NOTE  09/12/2018    Patient: Andrew Lockwood  MRN: [id number]    Subjective  No acute overnight events. Pain well controlled. No N/V. Voiding independently. +flatus/+ loose BM. No fever. Ambulated.     Objective  Temp:  [96.7  F (35.9  C)-97.4  F (36.3  C)] 96.7  F (35.9  C)  Heart Rate:  [70-77] 70  Resp:  [16-18] 16  BP: (114-134)/(70-78) 118/78  SpO2:  [97 %-99 %] 99 %    General: AAOx4, NAD, lying comfortably in bed  CV: Non cyanotic   Pulm: Breathing comfortably  Abd: Soft, mildly distended, mildly tender to palpation bladimir LLQ, no guarding, non peritonitic   Extremities: moving all extremities spontaneously without apparent deficit    Intake/Output Summary (Last 24 hours) at 09/12/18 0840  Last data filed at 09/12/18 0659   Gross per 24 hour   Intake             3480 ml   Output             1250 ml   Net             2230 ml     Labs:    Recent Labs  Lab 09/11/18  0650 09/09/18  0816   WBC 6.1 8.4   HGB 13.6 15.5    260       Recent Labs  Lab 09/12/18  0712 09/11/18  0650 09/09/18  0816    139 138   POTASSIUM 3.4 3.4 3.4   CHLORIDE 108 108 105   CO2 25 24 26   BUN 6* 10 11   CR 0.68 0.68 0.78   * 93 158*   LINDA 8.5 8.3* 9.1   MAG  --  2.1 2.3   PHOS  --  2.4*  --        Recent Labs  Lab 09/11/18  0650 09/09/18  0816   AST 19 20   ALT 28 33   ALKPHOS 91 128   BILITOTAL 0.6 0.7   ALBUMIN 3.1* 3.8     Imaging:  Gastrografin AXR 9/11  IMPRESSION: Gastrografin challenge with contrast material throughout  the large bowel and rectum.    Assessment & Plan  Andrew Lockwood is a 50 year old male with PMHx of HIV/AIDS, appendectomy, hx of pSBO s/p lap lysis of adhesion in April, presenting with N/V, abdominal pain and distention of 1 day, SBO on CT. Gastrograin with contrast in rectum. Abd with mild distention, unchanged. Will manage conservatively.     -NG removed yesterday  -Clear liquid diet as tolerated   -Will continue to follow    Patient seen and discussed with chief resident  and will be discussed with staff.  Janet Chow MD  Surgery PGY1

## 2018-09-12 NOTE — PLAN OF CARE
Problem: Patient Care Overview  Goal: Plan of Care/Patient Progress Review  Outcome: Improving  NG tube removed and started on clear liquids. Tolerated liquids well and patient also ate multiple packages of crackers from nutrition room. Reinforced that patient is only supposed to have clear liquids at this point. Notified provider. Had two loose bowel movements and is passing gas. Voiding but not saving. Denies pain and nausea. Bedtime insulin given per sliding scale. VSS on RA.

## 2018-09-12 NOTE — PLAN OF CARE
"Problem: Bowel Obstruction (Adult)  Goal: Signs and Symptoms of Listed Potential Problems Will be Absent, Minimized or Managed (Bowel Obstruction)  Signs and symptoms of listed potential problems will be absent, minimized or managed by discharge/transition of care (reference Bowel Obstruction (Adult) CPG).   Outcome: Improving  Neuro: A&Ox4.   Cardiac: VSS.   Respiratory: Sating 100 on RA.  GI/: No BM reported today- diarrhea yesterday. Feels like abd is \"bloated\"/   Diet/appetite: Tolerating Clear diet. Denies nausea. Started on Full diet  Activity:  Independent in room and halls. Off unit for a couple hours today.   Pain: No pain meds given  Skin: No new deficits noted.  LDA's: L PIV infusing.     Plan: Continue with POC. Notify primary team with changes.      "

## 2018-09-13 VITALS
DIASTOLIC BLOOD PRESSURE: 75 MMHG | HEART RATE: 84 BPM | TEMPERATURE: 97.6 F | OXYGEN SATURATION: 96 % | RESPIRATION RATE: 16 BRPM | SYSTOLIC BLOOD PRESSURE: 117 MMHG

## 2018-09-13 LAB
GLUCOSE BLDC GLUCOMTR-MCNC: 245 MG/DL (ref 70–99)
GLUCOSE BLDC GLUCOMTR-MCNC: 287 MG/DL (ref 70–99)
GLUCOSE BLDC GLUCOMTR-MCNC: 309 MG/DL (ref 70–99)

## 2018-09-13 PROCEDURE — 99239 HOSP IP/OBS DSCHRG MGMT >30: CPT | Performed by: INTERNAL MEDICINE

## 2018-09-13 PROCEDURE — 00000146 ZZHCL STATISTIC GLUCOSE BY METER IP

## 2018-09-13 PROCEDURE — 99207 ZZC APP CREDIT; MD BILLING SHARED VISIT: CPT | Performed by: PHYSICIAN ASSISTANT

## 2018-09-13 ASSESSMENT — ACTIVITIES OF DAILY LIVING (ADL)
ADLS_ACUITY_SCORE: 10

## 2018-09-13 NOTE — PLAN OF CARE
Problem: Patient Care Overview  Goal: Plan of Care/Patient Progress Review  Outcome: No Change  VSS. Up ad lexi, ambulating in halls. Denies pain/nausea. Had a loose BM x2. Voiding, not saving. Diet advanced to moderate consistent CHO. Explained importance and encouraged pt to go slow with diet - but he ate a hamburger, french fries, and chicken. BG checks 146 and 182. Lantus given at bedtime. PIV saline locked. Continue with POC.
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
## 2018-09-13 NOTE — PROGRESS NOTES
During my attempted visit, pt is not available.      Will continue to provide support to pt/family during their hospitalization at least 1x/wk.

## 2018-09-13 NOTE — PLAN OF CARE
Problem: Patient Care Overview  Goal: Plan of Care/Patient Progress Review  Outcome: Adequate for Discharge Date Met: 09/13/18  Abdomen rounded. Passing flatus. Denies pain and nausea. PIV removed. Voiding spontaneously, not saving. Tolerating diabetic diet. BG ac and hs. Carb coverage, sliding scale, and long acting insulin given. Up ad lexi. VSS on room air. Pt eager to discharge. Adequate for discharge. Discharge paperwork and education complete, no further questions. All belongings with patient. Belongings with security returned to patient. HIV medication from patient supply used on admission, all pills used. Pt aware. Pt refused transport. Pt left unit at 1020 and will  discharge medications and clinic and surgery center pharmacy.

## 2018-09-13 NOTE — PLAN OF CARE
Problem: Patient Care Overview  Goal: Plan of Care/Patient Progress Review  Outcome: No Change  Abdomen rounded. Passing flatus, loose BM x1 per pt report. Denies pain and nausea. PIV saline locked. Voiding spontaneously, not saving. Tolerating diabetic diet. BG ac and hs, 0200 245. Up ad lexi. VSS on room air. Pt awake for majority of shift.

## 2018-09-19 ENCOUNTER — RADIANT APPOINTMENT (OUTPATIENT)
Dept: GENERAL RADIOLOGY | Facility: CLINIC | Age: 53
End: 2018-09-19
Attending: FAMILY MEDICINE
Payer: COMMERCIAL

## 2018-09-19 ENCOUNTER — OFFICE VISIT (OUTPATIENT)
Dept: ORTHOPEDICS | Facility: CLINIC | Age: 53
End: 2018-09-19
Payer: COMMERCIAL

## 2018-09-19 ENCOUNTER — OFFICE VISIT (OUTPATIENT)
Dept: INTERNAL MEDICINE | Facility: CLINIC | Age: 53
End: 2018-09-19
Payer: COMMERCIAL

## 2018-09-19 VITALS
BODY MASS INDEX: 29.87 KG/M2 | HEART RATE: 70 BPM | SYSTOLIC BLOOD PRESSURE: 126 MMHG | WEIGHT: 174 LBS | DIASTOLIC BLOOD PRESSURE: 88 MMHG

## 2018-09-19 VITALS
HEART RATE: 70 BPM | SYSTOLIC BLOOD PRESSURE: 126 MMHG | WEIGHT: 174.9 LBS | BODY MASS INDEX: 30.02 KG/M2 | DIASTOLIC BLOOD PRESSURE: 88 MMHG

## 2018-09-19 DIAGNOSIS — M25.511 RIGHT SHOULDER PAIN: ICD-10-CM

## 2018-09-19 DIAGNOSIS — M79.671 RIGHT FOOT PAIN: ICD-10-CM

## 2018-09-19 DIAGNOSIS — G89.29 CHRONIC RIGHT SHOULDER PAIN: Primary | ICD-10-CM

## 2018-09-19 DIAGNOSIS — R12 HEART BURN: Primary | ICD-10-CM

## 2018-09-19 DIAGNOSIS — M25.511 CHRONIC RIGHT SHOULDER PAIN: Primary | ICD-10-CM

## 2018-09-19 RX ORDER — METHYLPREDNISOLONE 4 MG
TABLET, DOSE PACK ORAL
Qty: 21 TABLET | Refills: 0 | Status: SHIPPED | OUTPATIENT
Start: 2018-09-19 | End: 2018-10-05

## 2018-09-19 ASSESSMENT — PAIN SCALES - GENERAL: PAINLEVEL: NO PAIN (0)
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
## 2018-09-19 NOTE — TELEPHONE ENCOUNTER
FUTURE VISIT INFORMATION      FUTURE VISIT INFORMATION:    Date: 9/20    Time: 10:30    Location:   REFERRAL INFORMATION:    Referring provider:  Self    Referring providers clinic:      Reason for visit/diagnosis: left knee pain.         RECORDS STATUS      No Outside records

## 2018-09-19 NOTE — MR AVS SNAPSHOT
After Visit Summary   9/19/2018    Andrew Lockwood    MRN: [id number]           Patient Information     Date Of Birth          11/27/1967        Visit Information        Provider Department      9/19/2018 10:00 AM Caitie Lamb MD [facility]        Today's Diagnoses     Heart burn    -  1       Follow-ups after your visit        Your next 10 appointments already scheduled     Oct 08, 2018  1:00 PM CDT   (Arrive by 12:45 PM)   Return Visit with Rekha Membreno MD   [facility] ([facility])    [address] [place] [id number] [phone]              Future tests that were ordered for you today     Open Future Orders        Priority Expected Expires Ordered    PHYSICAL THERAPY REFERRAL (External-Prints) Routine  9/26/2019 9/26/2018            Who to contact     Please call your clinic at [phone] to:    Ask questions about your health    Make or cancel appointments    Discuss your medicines    Learn about your test results    Speak to your doctor            Additional Information About Your Visit        MyChart Information     Fronto gives you secure access to your electronic health record. If you see a primary care provider, you can also send messages to your care team and make appointments. If you have questions, please call your primary care clinic.  If you do not have a primary care provider, please call [phone] and they will assist you.      Fronto is an electronic gateway that provides easy, online access to your medical records. With Fronto, you can request a clinic appointment, read your test results, renew a prescription or communicate with your care team.     To access your existing account, please contact your [facility] or call [phone] for assistance.        Care EveryWhere ID     This is your Care EveryWhere ID. This could be used by other  organizations to access your [facility] medical records  [id number]        Your Vitals Were     Pulse BMI (Body Mass Index)                70 30.02 kg/m2           Blood Pressure from Last 3 Encounters:   No data found for BP    Weight from Last 3 Encounters:   No data found for Wt              Today, you had the following     No orders found for display         Today's Medication Changes          These changes are accurate as of 9/19/18 11:59 PM.  If you have any questions, ask your nurse or doctor.               Start taking these medicines.        Dose/Directions    methylPREDNISolone 4 MG tablet   Commonly known as:  MEDROL DOSEPAK   Used for:  Chronic right shoulder pain, Right foot pain   Started by:  Aguila Cannon DO        Follow package instructions   Quantity:  21 tablet   Refills:  0       ranitidine 150 MG tablet   Commonly known as:  ZANTAC   Used for:  Heart burn   Started by:  Caitie Lamb MD        Dose:  150 mg   Take 1 tablet (150 mg) by mouth 2 times daily   Quantity:  60 tablet   Refills:  3       tiZANidine 4 MG tablet   Commonly known as:  ZANAFLEX   Used for:  Chronic right shoulder pain, Right foot pain   Started by:  Aguila Cannon DO        Dose:  4 mg   Take 1 tablet (4 mg) by mouth 3 times daily   Quantity:  10 tablet   Refills:  0         These medicines have changed or have updated prescriptions.        Dose/Directions    omeprazole 20 MG CR capsule   Commonly known as:  priLOSEC   This may have changed:  when to take this   Used for:  Heart burn   Changed by:  Caitie Lamb MD        Dose:  20 mg   Take 1 capsule (20 mg) by mouth 2 times daily   Quantity:  180 capsule   Refills:  3            Where to get your medicines      These medications were sent to [facility] [address] [place] [address] [address] [place] [id number]    Hours:  TRANSPLANT PHONE NUMBER [phone] Phone:  [phone]      methylPREDNISolone 4 MG tablet    omeprazole 20 MG CR capsule    ranitidine 150 MG tablet    tiZANidine 4 MG tablet                Primary Care Provider Fax #    Physician No Ref-Primary [phone]       No address on file        Equal Access to Services     RA JULIAN : Apollo Cody, waaxda luqadaha, qaybta kaalmada axel, dez ordoñeztenzin martin. So [facility] [phone].    ATENCIÓN: Si habla español, tiene a kohli disposición servicios gratuitos de asistencia lingüística. Llame al [phone].    We comply with applicable federal civil rights laws and [place] laws. We do not discriminate on the basis of race, color, national origin, age, disability, sex, sexual orientation, or gender identity.            Thank you!     Thank you for choosing [facility]  for your care. Our goal is always to provide you with excellent care. Hearing back from our patients is one way we can continue to improve our services. Please take a few minutes to complete the written survey that you may receive in the mail after your visit with us. Thank you!             Your Updated Medication List - Protect others around you: Learn how to safely use, store and throw away your medicines at www.disposemymeds.org.          This list is accurate as of 9/19/18 11:59 PM.  Always use your most recent med list.                   Brand Name Dispense Instructions for use Diagnosis    Bictegravir-Emtricitab-Tenofov -25 MG Tabs     30 tablet    Take 1 tablet by mouth daily    HIV (human immunodeficiency virus infection) (H)       blood glucose lancets standard    no brand specified    100 Box    Use to test blood sugar four times daily or as directed.    Type 2 diabetes mellitus without complication, without long-term current use of insulin (H)       blood glucose monitoring meter device kit    no brand specified    1 kit    Use to test blood sugar 4 times daily or as directed.    Type 2  diabetes mellitus with complication, without long-term current use of insulin (H)       blood glucose monitoring test strip    no brand specified    100 strip    1 strip by In Vitro route 4 times daily (before meals and nightly) Use to test blood sugars 4 times daily or as directed    Type 2 diabetes mellitus with complication, without long-term current use of insulin (H)       empagliflozin 10 MG Tabs tablet    JARDIANCE    90 tablet    Take 1 tablet by mouth daily    Diabetes mellitus, type 2 (H)       insulin pen needle 31G X 8 MM    B-D U/F    100 each    Use 1 pen needles daily or as directed.    Type 2 diabetes mellitus without complication, without long-term current use of insulin (H)       KAOPECTATE PO      Take by mouth as needed        LANTUS SOLOSTAR 100 UNIT/ML injection   Generic drug:  insulin glargine     15 mL    Inject 50 Units Subcutaneous every morning    Type 2 diabetes mellitus with hyperglycemia, without long-term current use of insulin (H)       methylPREDNISolone 4 MG tablet    MEDROL DOSEPAK    21 tablet    Follow package instructions    Chronic right shoulder pain, Right foot pain       omeprazole 20 MG CR capsule    priLOSEC    180 capsule    Take 1 capsule (20 mg) by mouth 2 times daily    Heart burn       ondansetron 4 MG tablet    ZOFRAN    10 tablet    Take 1 tablet (4 mg) by mouth every 8 hours as needed for nausea        ranitidine 150 MG tablet    ZANTAC    60 tablet    Take 1 tablet (150 mg) by mouth 2 times daily    Heart burn       tiZANidine 4 MG tablet    ZANAFLEX    10 tablet    Take 1 tablet (4 mg) by mouth 3 times daily    Chronic right shoulder pain, Right foot pain       triamcinolone 0.025 % cream    KENALOG    15 g    Apply topically 2 times daily    Folliculitis

## 2018-09-19 NOTE — PROGRESS NOTES
Chief Complaint   Andrew Lockwood is a 50 year old male presents for   Chief Complaint   Patient presents with     Heartburn     Started after hospitalization for small bowel obstruction.       SUBJECTIVE:  Recently discharged from hospital for bowel obstruction.  He is now having regular BMs and passing gas.  No nausea.     He is having symptoms of severe heart burn.  He reports taking prilosec daily and kaopectate without any symptoms relief.  He had a EGD in 2016 that should some esopahgitis/inflammation. No hx of candidal infections and his HIV is under good control.  Denies any NSAID use.    Medications and allergies were reviewed by me today.     Social History   History   Smoking Status     Current Every Day Smoker     Packs/day: 0.50     Types: Cigarettes     Last attempt to quit: 10/28/2016   Smokeless Tobacco     Former User     Comment: quit Aug 2018      PHQ-2 ( [facility]) 7/19/2018   Q1: Little interest or pleasure in doing things 0   Q2: Feeling down, depressed or hopeless 0   PHQ-2 Score 0     PHQ-9 SCORE 6/22/2017 8/2/2018   Total Score 17 0       Past Medical History   Patient Active Problem List   Diagnosis     Allergic rhinitis due to other allergen     Brain lesion     Toxoplasma encephalitis (H)     Pulmonary nodules     Human immunodeficiency virus I infection (H)     Folliculitis     Prurigo nodularis     Epidermal cyst     Shoulder joint pain, unspecified laterality     CNS toxoplasmosis (H)     Constipation     Non-intractable vomiting with nausea, vomiting of unspecified type     Thrush     Insomnia, unspecified insomnia     Bowel obstruction     Toxoplasmosis     Gastroesophageal reflux disease     Headache     Aphthous ulcer     Type 2 diabetes mellitus (H)     Small bowel obstruction     SBO (small bowel obstruction)     Slow transit constipation     Periungual wart     Herpes zoster     Erectile dysfunction, unspecified erectile dysfunction type     Insomnia, unspecified type      Preventative health care     Pain of right thumb     Rupture of ulnar collateral ligament of right thumb     Aftercare following surgery of the musculoskeletal system     Panic disorder     Generalized anxiety disorder     Major depressive disorder, single episode, unspecified     Abdominal pain     Acute appendicitis with localized peritonitis     S/P appendectomy     Abdominal abscess (H)     Appendicitis     Otitis media     Horseshoe tear of retina of left eye without detachment     Adynamic ileus (H)       Review of Systems   5 point ROS completed and negative except noted above, including Gen, CV, Resp, GI, MS    Physical Exam   /88  Pulse 70  Wt 79.3 kg (174 lb 14.4 oz)  BMI 30.02 kg/m2  Gen: no distress, comfortable, pleasant   Eyes: anicteric, normal extra-ocular movements   Mouth: posterior oropharynx erythematous with some cobblestoning  Gastrointestinal: positive bowel sounds, nontender, nondistended  Skin: no concerning lesions, no jaundice   Psychological: appropriate mood     Assessment & Plan      Heart burn  Describes severe symptoms.  His HIV load is undetectable and no evidence of candida on exam today, so this is less likely.  Will increase prilosec to BID and add zantac for more rapid relief.  He was previously referred to GI, so will help him schedule an appt there.  - ranitidine (ZANTAC) 150 MG tablet  Dispense: 60 tablet; Refill: 3  - omeprazole (PRILOSEC) 20 MG CR capsule  Dispense: 180 capsule; Refill: 3    Recent SBO: symptoms resolved.        RTC: No Follow-up on file.    Caitie Lamb MD
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
## 2018-09-19 NOTE — MR AVS SNAPSHOT
After Visit Summary   9/19/2018    Andrew Lockwood    MRN: [id number]           Patient Information     Date Of Birth          11/27/1967        Visit Information        Provider Department      9/19/2018 10:10 AM Aguila Cannon DO [facility]        Today's Diagnoses     Right shoulder pain    -  1    Right foot pain          Care Instructions    Neck Spasm Exercises    You may do all of these exercises right away but avoid any movements that increase your pain.    Neck rotation with flexion  Right side: Turn your head to the right and clasp your hands behind your head. Let the weight of your arms pull your chin to the right side of your chest. Relax. Hold for a count of 15. Do this 3 times.    Left side: Turn your head to the left and clasp your hands behind your head. Let the weight of your arms pull your chin to the left side of your chest. Relax. Hold for a count of 15. Do this 3 times.    Chin tuck: Place your fingertips on your chin and gently push your head straight back as if you are trying to make a double chin. Keep looking forward as your head moves back. Hold 5 seconds and repeat 5 times.  Scalene stretch: Sit or stand and clasp both hands behind your back. Lower your left shoulder and tilt your head toward the right until you feel a stretch. Hold this position for 15 to 30 seconds and then come back to the starting position. Then lower your right shoulder and tilt your head toward the left. Hold for 15 to 30 seconds. Repeat 3 times on each side.  Neck rotation stretch    Right side: Rotate your neck by looking over your right shoulder. Lift your right hand and place your palm on the left side of your chin. Push your chin with your palm toward your right shoulder. Hold for a count of 10. Do this 3 times.    Left side: Rotate your neck by looking over your left shoulder. Lift your left hand and place your palm on the right side of your chin. Push your chin  with your palm toward your left shoulder. Hold for a count of 10. Do this 3 times.    Scapular squeeze: While sitting or standing with your arms by your sides, squeeze your shoulder blades together and hold for 5 seconds. Do 2 sets of 15.  Thoracic extension: Sit in a chair and clasp both arms behind your head. Gently arch backward and look up toward the ceiling. Repeat 10 times. Do this several times each day.    Head lift with neck curl: Lie on your back with your knees bent and your feet flat on the floor. Tuck your chin and lift your head about 3 inches off the floor, keeping your shoulders flat on the floor. Hold for 10 seconds. Repeat 5 times. Try to work up to holding for 20 to 30 seconds.          Rotator Cuff Exercises    Isometric shoulder external rotation:  a doorway with your elbow bent 90 degrees and the back of the wrist on your injured side pressed against the door frame. Try to press your hand outward into the door frame. Hold for 5 seconds. Do 2 sets of 15.    Isometric shoulder internal rotation:  a doorway with your elbow bent 90 degrees and the front of the wrist on your injured side pressed against the door frame. Try to press your palm into the door frame. Hold for 5 seconds. Do 2 sets of 15.  Wand exercise, flexion: Stand upright and hold a stick in both hands, palms down. Stretch your arms by lifting them over your head, keeping your arms straight. Hold for 5 seconds and return to the starting position. Repeat 10 times.    Wand exercise, extension: Stand upright and hold a stick in both hands behind your back. Move the stick away from your back. Hold this position for 5 seconds. Relax and return to the starting position. Repeat 10 times.  Wand exercise, external rotation: Lie on your back and hold a stick in both hands, palms up. Your upper arms should be resting on the floor with your elbows at your sides and bent 90 degrees. Use your uninjured arm to push your injured arm  out away from your body. Keep the elbow of your injured arm at your side while it is being pushed. Hold the stretch for 5 seconds. Repeat 10 times.    Wand exercise, shoulder abduction and adduction: Stand and hold a stick with both hands, palms facing away from your body. Rest the stick against the front of your thighs. Use your uninjured arm to push your injured arm out to the side and up as high as possible. Keep your arms straight. Hold for 5 seconds. Repeat 10 times.    Resisted shoulder external rotation: Stand sideways next to a door with your injured arm farther from the door. Tie a knot in the end of the tubing and shut the knot in the door at waist level (or use cable weight machine at gym). Hold the other end of the tubing with the hand of your injured arm. Rest the hand of your injured arm across your stomach. Keeping your elbow in at your side, rotate your arm outward and away from your waist. Slowly return your arm to the starting position. Make sure you keep your elbow bent 90 degrees and your forearm parallel to the floor. Repeat 10 times. Build up to 2 sets of 15.    Resisted shoulder internal rotation: Stand sideways next to a door with your injured arm closest to the door. Tie a knot in the end of the tubing and shut the knot in the door at waist level (or use cable weight machine at gym). Hold the other end of the tubing with the hand of your injured arm. Bend the elbow of your injured arm 90 degrees. Keeping your elbow in at your side, rotate your forearm across your body and then slowly back to the starting position. Make sure you keep your forearm parallel to the floor. Do 2 sets of 8 to 12.    Scaption: Stand with your arms at your sides and with your elbows straight. Slowly raise your arms to eye level. As you raise your arms, spread them apart so that they are only slightly in front of your body (at about a 30-degree angle to the front of your body). Point your thumbs toward the ceiling.  "Hold for 2 seconds and lower your arms slowly. Do 2 sets of 15. Progress to holding a soup can or light weight when you are doing the exercise and increase the weight as the exercise gets easier.    Side-lying external rotation: Lie on your uninjured side with your injured arm at your side and your elbow bent 90 degrees. Keeping your elbow against your side, raise your forearm toward the ceiling and hold for 2 seconds. Slowly lower your arm. Do 2 sets of 15. You can start doing this exercise holding a soup can or light weight and gradually increase the weight as long as there is no pain.    Horizontal abduction: Lie on your stomach on a table or the edge of a bed with the arm on your injured side hanging down over the edge. Raise your arm out to the side, with your thumb pointed toward the ceiling, until your arm is parallel to the floor. Hold for 2 seconds and then lower it slowly. Start this exercise with no weight. As you get stronger, add a light weight or hold a soup can. Do 2 sets of 15.    Push-up with a plus: Begin on the floor on your hands and knees. Keep your hands a shoulder width apart and lift your feet off the floor. Arch your back as high as possible and round your shoulders (this is the \"plus\" part or the exercise). Bend your elbows and lower your body to the floor. Return to the starting position and arch your back again. Do 2 sets of 15.        Thank you for choosing the [facility].     For your follow-up care, we will have you seen in the Sports Medicine Clinic with [facility].    You can contact this clinic by phone at  [phone] (Sports Medicine Clinic)    Insurance Claim.          Follow-ups after your visit        Your next 10 appointments already scheduled     Sep 20, 2018 10:30 AM CDT   (Arrive by 10:15 AM)   New Patient Visit with Judson Allen MD   [facility] ([facility])    [address] [place] [address]  [address] " [facility] [address] [phone]            Oct 08, 2018  1:00 PM CDT   (Arrive by 12:45 PM)   Return Visit with Rekha Membreno MD   [facility] ([facility])    [address]  [address] [facility] [id number] [phone]              Future tests that were ordered for you today     Open Future Orders        Priority Expected Expires Ordered    MR Foot Right w/o Contrast Routine  9/19/2019 9/19/2018            Who to contact     Please call your clinic at [phone] to:    Ask questions about your health    Make or cancel appointments    Discuss your medicines    Learn about your test results    Speak to your doctor            Additional Information About Your Visit        [employer] Information     [employer] gives you secure access to your electronic health record. If you see a primary care provider, you can also send messages to your care team and make appointments. If you have questions, please call your primary care clinic.  If you do not have a primary care provider, please call [phone] and they will assist you.      [employer] is an electronic gateway that provides easy, online access to your medical records. With [employer], you can request a clinic appointment, read your test results, renew a prescription or communicate with your care team.     To access your existing account, please contact your [facility] or call [phone] for assistance.        Care EveryWhere ID     This is your Care EveryWhere ID. This could be used by other organizations to access your [place] medical records  [id number]        Your Vitals Were     Pulse BMI (Body Mass Index)                70 29.87 kg/m2           Blood Pressure from Last 3 Encounters:   09/19/18 126/88   09/19/18 126/88   09/13/18 117/75    Weight from Last 3 Encounters:   09/19/18 78.9 kg (174 lb)   09/19/18 79.3 kg (174 lb 14.4 oz)   09/09/18 77.6 kg (171 lb)                  Today's Medication Changes          These changes are accurate as of 9/19/18 10:58 AM.  If you have any questions, ask your nurse or doctor.               Start taking these medicines.        Dose/Directions    methylPREDNISolone 4 MG tablet   Commonly known as:  MEDROL DOSEPAK   Used for:  Right shoulder pain, Right foot pain   Started by:  Aguila Cannon DO        Follow package instructions   Quantity:  21 tablet   Refills:  0       ranitidine 150 MG tablet   Commonly known as:  ZANTAC   Used for:  Heart burn   Started by:  Caitie Lamb MD        Dose:  150 mg   Take 1 tablet (150 mg) by mouth 2 times daily   Quantity:  60 tablet   Refills:  3       tiZANidine 4 MG tablet   Commonly known as:  ZANAFLEX   Used for:  Right shoulder pain, Right foot pain   Started by:  Aguila Cannon DO        Dose:  4 mg   Take 1 tablet (4 mg) by mouth 3 times daily   Quantity:  10 tablet   Refills:  0         These medicines have changed or have updated prescriptions.        Dose/Directions    omeprazole 20 MG CR capsule   Commonly known as:  priLOSEC   This may have changed:  when to take this   Used for:  Heart burn   Changed by:  Caitie Lamb MD        Dose:  20 mg   Take 1 capsule (20 mg) by mouth 2 times daily   Quantity:  180 capsule   Refills:  3            Where to get your medicines      These medications were sent to [address] [address]    Hours:  TRANSPLANT PHONE NUMBER [phone] Phone:  [phone]     methylPREDNISolone 4 MG tablet    omeprazole 20 MG CR capsule    ranitidine 150 MG tablet    tiZANidine 4 MG tablet                Primary Care Provider Fax #    Physician No Ref-Primary [phone]       No address on file        Equal Access to Services     RA JORDAN AH: maverick Gruber qaybta kaalmada adeegyada, waxay idiin  michael bertecho yuntobi morneo ah. So [facility] [phone].    ATENCIÓN: Si morrola dorothea, tiene a kohli disposición servicios gratuitos de asistencia lingüística. Gurdeep al [phone].    We comply with applicable federal civil rights laws and [place] laws. We do not discriminate on the basis of race, color, national origin, age, disability, sex, sexual orientation, or gender identity.            Thank you!     Thank you for choosing [facility]  for your care. Our goal is always to provide you with excellent care. Hearing back from our patients is one way we can continue to improve our services. Please take a few minutes to complete the written survey that you may receive in the mail after your visit with us. Thank you!             Your Updated Medication List - Protect others around you: Learn how to safely use, store and throw away your medicines at www.disposemymeds.org.          This list is accurate as of 9/19/18 10:58 AM.  Always use your most recent med list.                   Brand Name Dispense Instructions for use Diagnosis    Bictegravir-Emtricitab-Tenofov -25 MG Tabs     30 tablet    Take 1 tablet by mouth daily    HIV (human immunodeficiency virus infection) (H)       blood glucose lancets standard    no brand specified    100 Box    Use to test blood sugar four times daily or as directed.    Type 2 diabetes mellitus without complication, without long-term current use of insulin (H)       blood glucose monitoring meter device kit    no brand specified    1 kit    Use to test blood sugar 4 times daily or as directed.    Type 2 diabetes mellitus with complication, without long-term current use of insulin (H)       blood glucose monitoring test strip    no brand specified    100 strip    1 strip by In Vitro route 4 times daily (before meals and nightly) Use to test blood sugars 4 times daily or as directed    Type 2 diabetes mellitus with complication, without long-term  current use of insulin (H)       empagliflozin 10 MG Tabs tablet    JARDIANCE    90 tablet    Take 1 tablet by mouth daily    Diabetes mellitus, type 2 (H)       insulin pen needle 31G X 8 MM    B-D U/F    100 each    Use 1 pen needles daily or as directed.    Type 2 diabetes mellitus without complication, without long-term current use of insulin (H)       KAOPECTATE PO      Take by mouth as needed        LANTUS SOLOSTAR 100 UNIT/ML injection   Generic drug:  insulin glargine     15 mL    Inject 50 Units Subcutaneous every morning    Type 2 diabetes mellitus with hyperglycemia, without long-term current use of insulin (H)       methylPREDNISolone 4 MG tablet    MEDROL DOSEPAK    21 tablet    Follow package instructions    Right shoulder pain, Right foot pain       omeprazole 20 MG CR capsule    priLOSEC    180 capsule    Take 1 capsule (20 mg) by mouth 2 times daily    Heart burn       ondansetron 4 MG tablet    ZOFRAN    10 tablet    Take 1 tablet (4 mg) by mouth every 8 hours as needed for nausea        ranitidine 150 MG tablet    ZANTAC    60 tablet    Take 1 tablet (150 mg) by mouth 2 times daily    Heart burn       tiZANidine 4 MG tablet    ZANAFLEX    10 tablet    Take 1 tablet (4 mg) by mouth 3 times daily    Right shoulder pain, Right foot pain       triamcinolone 0.025 % cream    KENALOG    15 g    Apply topically 2 times daily    Folliculitis
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
## 2018-09-19 NOTE — TELEPHONE ENCOUNTER
FUTURE VISIT INFORMATION      FUTURE VISIT INFORMATION:    Date: 9/20    Time: 10:15    Location:   REFERRAL INFORMATION:    Referring provider:  Self    Referring providers clinic:      Reason for visit/diagnosis: left knee pain.        RECORDS STATUS      NO OUTSIDE RECORDS

## 2018-09-20 ENCOUNTER — PRE VISIT (OUTPATIENT)
Dept: ORTHOPEDICS | Facility: CLINIC | Age: 53
End: 2018-09-20

## 2018-09-20 DIAGNOSIS — G89.29 CHRONIC PAIN OF LEFT KNEE: Primary | ICD-10-CM

## 2018-09-20 DIAGNOSIS — M25.562 CHRONIC PAIN OF LEFT KNEE: Primary | ICD-10-CM

## 2018-09-24 ENCOUNTER — PRE VISIT (OUTPATIENT)
Dept: ORTHOPEDICS | Facility: CLINIC | Age: 53
End: 2018-09-24

## 2018-09-25 ENCOUNTER — RADIANT APPOINTMENT (OUTPATIENT)
Dept: MRI IMAGING | Facility: CLINIC | Age: 53
End: 2018-09-25
Attending: FAMILY MEDICINE
Payer: COMMERCIAL

## 2018-09-25 DIAGNOSIS — M79.671 RIGHT FOOT PAIN: ICD-10-CM

## 2018-09-25 DIAGNOSIS — G89.29 CHRONIC RIGHT SHOULDER PAIN: ICD-10-CM

## 2018-09-25 DIAGNOSIS — M25.511 CHRONIC RIGHT SHOULDER PAIN: ICD-10-CM

## 2018-09-26 ENCOUNTER — OFFICE VISIT (OUTPATIENT)
Dept: ORTHOPEDICS | Facility: CLINIC | Age: 53
End: 2018-09-26
Payer: MEDICAID

## 2018-09-26 VITALS — BODY MASS INDEX: 29.02 KG/M2 | HEIGHT: 64 IN | WEIGHT: 170 LBS | RESPIRATION RATE: 16 BRPM

## 2018-09-26 DIAGNOSIS — G89.29 CHRONIC FOOT PAIN, RIGHT: Primary | ICD-10-CM

## 2018-09-26 DIAGNOSIS — M79.671 CHRONIC FOOT PAIN, RIGHT: Primary | ICD-10-CM
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
## 2018-09-27 ENCOUNTER — HOSPITAL ENCOUNTER (EMERGENCY)
Facility: CLINIC | Age: 53
Discharge: HOME | End: 2018-09-28
Attending: EMERGENCY MEDICINE | Admitting: EMERGENCY MEDICINE
Payer: COMMERCIAL

## 2018-09-27 ENCOUNTER — APPOINTMENT (OUTPATIENT)
Dept: CT IMAGING | Facility: CLINIC | Age: 53
End: 2018-09-27
Attending: EMERGENCY MEDICINE
Payer: COMMERCIAL

## 2018-09-27 DIAGNOSIS — R10.84 ABDOMINAL PAIN, GENERALIZED: ICD-10-CM

## 2018-09-27 LAB
ALBUMIN SERPL-MCNC: 3.6 G/DL (ref 3.4–5)
ALP SERPL-CCNC: 121 U/L (ref 40–150)
ALT SERPL W P-5'-P-CCNC: 26 U/L (ref 0–70)
ANION GAP SERPL CALCULATED.3IONS-SCNC: 10 MMOL/L (ref 3–14)
AST SERPL W P-5'-P-CCNC: 15 U/L (ref 0–45)
BASOPHILS # BLD AUTO: 0 10E9/L (ref 0–0.2)
BASOPHILS NFR BLD AUTO: 0.2 %
BILIRUB SERPL-MCNC: 0.6 MG/DL (ref 0.2–1.3)
BUN SERPL-MCNC: 17 MG/DL (ref 7–30)
CALCIUM SERPL-MCNC: 9.1 MG/DL (ref 8.5–10.1)
CHLORIDE SERPL-SCNC: 102 MMOL/L (ref 94–109)
CO2 SERPL-SCNC: 24 MMOL/L (ref 20–32)
CREAT SERPL-MCNC: 0.89 MG/DL (ref 0.66–1.25)
DIFFERENTIAL METHOD BLD: NORMAL
EOSINOPHIL # BLD AUTO: 0.3 10E9/L (ref 0–0.7)
EOSINOPHIL NFR BLD AUTO: 2.5 %
ERYTHROCYTE [DISTWIDTH] IN BLOOD BY AUTOMATED COUNT: 13.6 % (ref 10–15)
ETHANOL SERPL-MCNC: <0.01 G/DL
GFR SERPL CREATININE-BSD FRML MDRD: 89 ML/MIN/1.7M2
GLUCOSE SERPL-MCNC: 263 MG/DL (ref 70–99)
HCT VFR BLD AUTO: 43.8 % (ref 40–53)
HGB BLD-MCNC: 14.9 G/DL (ref 13.3–17.7)
IMM GRANULOCYTES # BLD: 0.1 10E9/L (ref 0–0.4)
IMM GRANULOCYTES NFR BLD: 1.4 %
LIPASE SERPL-CCNC: 276 U/L (ref 73–393)
LYMPHOCYTES # BLD AUTO: 2.3 10E9/L (ref 0.8–5.3)
LYMPHOCYTES NFR BLD AUTO: 22.2 %
MCH RBC QN AUTO: 31.3 PG (ref 26.5–33)
MCHC RBC AUTO-ENTMCNC: 34 G/DL (ref 31.5–36.5)
MCV RBC AUTO: 92 FL (ref 78–100)
MONOCYTES # BLD AUTO: 0.6 10E9/L (ref 0–1.3)
MONOCYTES NFR BLD AUTO: 6.2 %
NEUTROPHILS # BLD AUTO: 6.9 10E9/L (ref 1.6–8.3)
NEUTROPHILS NFR BLD AUTO: 67.5 %
NRBC # BLD AUTO: 0 10*3/UL
NRBC BLD AUTO-RTO: 0 /100
PLATELET # BLD AUTO: 245 10E9/L (ref 150–450)
POTASSIUM SERPL-SCNC: 3.4 MMOL/L (ref 3.4–5.3)
PROT SERPL-MCNC: 7.3 G/DL (ref 6.8–8.8)
RBC # BLD AUTO: 4.76 10E12/L (ref 4.4–5.9)
SODIUM SERPL-SCNC: 136 MMOL/L (ref 133–144)
WBC # BLD AUTO: 10.3 10E9/L (ref 4–11)

## 2018-09-27 PROCEDURE — 83690 ASSAY OF LIPASE: CPT | Performed by: EMERGENCY MEDICINE

## 2018-09-27 PROCEDURE — 96375 TX/PRO/DX INJ NEW DRUG ADDON: CPT | Performed by: EMERGENCY MEDICINE

## 2018-09-27 PROCEDURE — 25000128 H RX IP 250 OP 636: Performed by: EMERGENCY MEDICINE

## 2018-09-27 PROCEDURE — 99285 EMERGENCY DEPT VISIT HI MDM: CPT | Mod: Z6 | Performed by: EMERGENCY MEDICINE

## 2018-09-27 PROCEDURE — 80320 DRUG SCREEN QUANTALCOHOLS: CPT | Performed by: EMERGENCY MEDICINE

## 2018-09-27 PROCEDURE — 99285 EMERGENCY DEPT VISIT HI MDM: CPT | Mod: 25 | Performed by: EMERGENCY MEDICINE

## 2018-09-27 PROCEDURE — 74177 CT ABD & PELVIS W/CONTRAST: CPT

## 2018-09-27 PROCEDURE — 80053 COMPREHEN METABOLIC PANEL: CPT | Performed by: EMERGENCY MEDICINE

## 2018-09-27 PROCEDURE — 85025 COMPLETE CBC W/AUTO DIFF WBC: CPT | Performed by: EMERGENCY MEDICINE

## 2018-09-27 PROCEDURE — 96361 HYDRATE IV INFUSION ADD-ON: CPT | Performed by: EMERGENCY MEDICINE

## 2018-09-27 PROCEDURE — 96374 THER/PROPH/DIAG INJ IV PUSH: CPT | Mod: 59 | Performed by: EMERGENCY MEDICINE

## 2018-09-27 RX ORDER — IOPAMIDOL 755 MG/ML
107 INJECTION, SOLUTION INTRAVASCULAR ONCE
Status: COMPLETED | OUTPATIENT
Start: 2018-09-27 | End: 2018-09-28

## 2018-09-27 RX ORDER — SODIUM CHLORIDE 9 MG/ML
1000 INJECTION, SOLUTION INTRAVENOUS CONTINUOUS
Status: DISCONTINUED | OUTPATIENT
Start: 2018-09-27 | End: 2018-09-28 | Stop reason: HOSPADM

## 2018-09-27 RX ORDER — LIDOCAINE HYDROCHLORIDE 10 MG/ML
INJECTION, SOLUTION EPIDURAL; INFILTRATION; INTRACAUDAL; PERINEURAL
Status: DISCONTINUED
Start: 2018-09-27 | End: 2018-09-28 | Stop reason: HOSPADM

## 2018-09-27 RX ORDER — ONDANSETRON 2 MG/ML
4 INJECTION INTRAMUSCULAR; INTRAVENOUS ONCE
Status: COMPLETED | OUTPATIENT
Start: 2018-09-27 | End: 2018-09-27

## 2018-09-27 RX ORDER — MORPHINE SULFATE 4 MG/ML
4 INJECTION, SOLUTION INTRAMUSCULAR; INTRAVENOUS
Status: COMPLETED | OUTPATIENT
Start: 2018-09-27 | End: 2018-09-27

## 2018-09-27 RX ADMIN — ONDANSETRON 4 MG: 2 INJECTION INTRAMUSCULAR; INTRAVENOUS at 23:08

## 2018-09-27 RX ADMIN — MORPHINE SULFATE 4 MG: 4 INJECTION INTRAVENOUS at 23:08

## 2018-09-27 RX ADMIN — SODIUM CHLORIDE 1000 ML: 9 INJECTION, SOLUTION INTRAVENOUS at 23:08

## 2018-09-27 ASSESSMENT — ENCOUNTER SYMPTOMS
BLOOD IN STOOL: 0
FEVER: 0
NAUSEA: 1
VOMITING: 1
CONSTIPATION: 0
ABDOMINAL PAIN: 1
DIARRHEA: 0

## 2018-09-27 NOTE — ED AVS SNAPSHOT
[facility], Emergency Department    [address] [facility] [facility] [address]    Phone:  [phone]                                       Andrew Lockwood   MRN: [id number]    Department:  [facility], Emergency Department   Date of Visit:  9/27/2018           Patient Information     Date Of Birth          11/27/1967        Your diagnoses for this visit were:     Abdominal pain, generalized        You were seen by Sharlene Tobin MD.        Discharge Instructions       Please make an appointment to follow up with Your Primary Care Provider as soon as possible.    Continue home antacids as prescribed.  Avoid NSAIDs such as ibuprofen, naproxen, aleve, etc.  You may use oxycodone sparingly for break through pain.      If you have worsening pain, intractable vomiting, fevers, or other concerns, return to the emergency department for re-evaluation .         Abdominal Pain    Abdominal pain is pain in the stomach or belly area. Everyone has this pain from time to time. In many cases it goes away on its own. But abdominal pain can sometimes be due to a serious problem, such as appendicitis. So it s important to know when to seek help.  Causes of abdominal pain  There are many possible causes of abdominal pain. Common causes in adults include:    Constipation, diarrhea, or gas    Stomach acid flowing back up into the esophagus (acid reflux or heartburn)    Severe acid reflux, called GERD (gastroesophageal reflux disease)    A sore in the lining of the stomach or small intestine (peptic ulcer)    Inflammation of the gallbladder, liver, or pancreas    Gallstones or kidney stones    Appendicitis     Intestinal blockage     An internal organ pushing through a muscle or other tissue (hernia)    Urinary tract infections    In women, menstrual cramps, fibroids, or endometriosis    Inflammation or infection of the intestines  Diagnosing the cause of abdominal pain  Your healthcare provider will do a physical exam help find  the cause of your pain. If needed, tests will be ordered. Belly pain has many possible causes. So it can be hard to find the reason for your pain. Giving details about your pain can help. Tell your provider where and when you feel the pain, and what makes it better or worse. Also let your provider know if you have other symptoms such as:    Fever    Tiredness    Upset stomach (nausea)    Vomiting    Changes in bathroom habits  Treating abdominal pain  Some causes of pain need emergency medical treatment right away. These include appendicitis or a bowel blockage. Other problems can be treated with rest, fluids, or medicines. Your healthcare provider can give you specific instructions for treatment or self-care based on what is causing your pain.  If you have vomiting or diarrhea, sip water or other clear fluids. When you are ready to eat solid foods again, start with small amounts of easy-to-digest, low-fat foods. These include apple sauce, toast, or crackers.   When to seek medical care  Call 911 or go to the hospital right away if you:    Can t pass stool and are vomiting    Are vomiting blood or have bloody diarrhea or black, tarry diarrhea    Have chest, neck, or shoulder pain    Feel like you might pass out    Have pain in your shoulder blades with nausea    Have sudden, severe belly pain    Have new, severe pain unlike any you have felt before    Have a belly that is rigid, hard, and tender to touch  Call your healthcare provider if you have:    Pain for more than 5 days    Bloating for more than 2 days    Diarrhea for more than 5 days    A fever of 100.4 F (38 C) or higher, or as directed by your healthcare provider    Pain that gets worse    Weight loss for no reason    Continued lack of appetite    Blood in your stool  How to prevent abdominal pain  Here are some tips to help prevent abdominal pain:    Eat smaller amounts of food at one time.    Avoid greasy, fried, or other high-fat foods.    Avoid foods  that give you gas.    Exercise regularly.    Drink plenty of fluids.  To help prevent GERD symptoms:    Quit smoking.    Reduce alcohol and certain foods that increase stomach acid.    Avoid aspirin and over-the-counter pain and fever medicines (NSAIDS or nonsteroidal anti-inflammatory drugs), if possible    Lose extra weight.    Finish eating at least 2 hours before you go to bed or lie down.    Raise the head of your bed.  Date Last Reviewed: 7/1/2016 2000-2017 [employer]. [address] [place] [address]. All rights reserved. This information is not intended as a substitute for professional medical care. Always follow your healthcare professional's instructions.            Your next 10 appointments already scheduled     Oct 08, 2018  1:00 PM CDT   (Arrive by 12:45 PM)   Return Visit with Rekha Membreno MD   [facility] ([facility])    [address] [place] [id number] [phone]              24 Hour Appointment Hotline       To make an appointment at any [facility], call [phone] ([phone]). If you don't have a family doctor or clinic, we will help you find one. [facility] are conveniently located to serve the needs of you and your family.             Review of your medicines      START taking        Dose / Directions Last dose taken    oxyCODONE IR 5 MG tablet   Commonly known as:  ROXICODONE   Dose:  5 mg   Quantity:  8 tablet        Take 1 tablet (5 mg) by mouth every 6 hours as needed for pain   Refills:  0          Our records show that you are taking the medicines listed below. If these are incorrect, please call your family doctor or clinic.        Dose / Directions Last dose taken    Bictegravir-Emtricitab-Tenofov -25 MG Tabs   Dose:  1 tablet   Quantity:  30 tablet        Take 1 tablet by mouth daily   Refills:  11        blood glucose lancets standard   Commonly known as:  no  brand specified   Quantity:  100 Box        Use to test blood sugar four times daily or as directed.   Refills:  5        blood glucose monitoring meter device kit   Commonly known as:  no brand specified   Quantity:  1 kit        Use to test blood sugar 4 times daily or as directed.   Refills:  0        blood glucose monitoring test strip   Commonly known as:  no brand specified   Dose:  1 strip   Quantity:  100 strip        1 strip by In Vitro route 4 times daily (before meals and nightly) Use to test blood sugars 4 times daily or as directed   Refills:  11        empagliflozin 10 MG Tabs tablet   Commonly known as:  JARDIANCE   Quantity:  90 tablet        Take 1 tablet by mouth daily   Refills:  3        insulin pen needle 31G X 8 MM   Commonly known as:  B-D U/F   Quantity:  100 each        Use 1 pen needles daily or as directed.   Refills:  0        KAOPECTATE PO        Take by mouth as needed   Refills:  0        LANTUS SOLOSTAR 100 UNIT/ML injection   Dose:  50 Units   Quantity:  15 mL   Generic drug:  insulin glargine        Inject 50 Units Subcutaneous every morning   Refills:  3        methylPREDNISolone 4 MG tablet   Commonly known as:  MEDROL DOSEPAK   Quantity:  21 tablet        Follow package instructions   Refills:  0        omeprazole 20 MG CR capsule   Commonly known as:  priLOSEC   Dose:  20 mg   Quantity:  180 capsule        Take 1 capsule (20 mg) by mouth 2 times daily   Refills:  3        ondansetron 4 MG tablet   Commonly known as:  ZOFRAN   Dose:  4 mg   Quantity:  10 tablet        Take 1 tablet (4 mg) by mouth every 8 hours as needed for nausea   Refills:  0        ranitidine 150 MG tablet   Commonly known as:  ZANTAC   Dose:  150 mg   Quantity:  60 tablet        Take 1 tablet (150 mg) by mouth 2 times daily   Refills:  3        tiZANidine 4 MG tablet   Commonly known as:  ZANAFLEX   Dose:  4 mg   Quantity:  10 tablet        Take 1 tablet (4 mg) by mouth 3 times daily   Refills:  0         triamcinolone 0.025 % cream   Commonly known as:  KENALOG   Quantity:  15 g        Apply topically 2 times daily   Refills:  0                Information about OPIOIDS     PRESCRIPTION OPIOIDS: WHAT YOU NEED TO KNOW   We gave you an opioid (narcotic) pain medicine. It is important to manage your pain, but opioids are not always the best choice. You should first try all the other options your care team gave you. Take this medicine for as short a time (and as few doses) as possible.    Some activities can increase your pain, such as bandage changes or therapy sessions. It may help to take your pain medicine 30 to 60 minutes before these activities. Reduce your stress by getting enough sleep, working on hobbies you enjoy and practicing relaxation or meditation. Talk to your care team about ways to manage your pain beyond prescription opioids.    These medicines have risks:    DO NOT drive when on new or higher doses of pain medicine. These medicines can affect your alertness and reaction times, and you could be arrested for driving under the influence (DUI). If you need to use opioids long-term, talk to your care team about driving.    DO NOT operate heavy machinery    DO NOT do any other dangerous activities while taking these medicines.    DO NOT drink any alcohol while taking these medicines.     If the opioid prescribed includes acetaminophen, DO NOT take with any other medicines that contain acetaminophen. Read all labels carefully. Look for the word  acetaminophen  or  Tylenol.  Ask your pharmacist if you have questions or are unsure.    You can get addicted to pain medicines, especially if you have a history of addiction (chemical, alcohol or substance dependence). Talk to your care team about ways to reduce this risk.    All opioids tend to cause constipation. Drink plenty of water and eat foods that have a lot of fiber, such as fruits, vegetables, prune juice, apple juice and high-fiber cereal. Take a laxative  (Miralax, milk of magnesia, Colace, Senna) if you don t move your bowels at least every other day. Other side effects include upset stomach, sleepiness, dizziness, throwing up, tolerance (needing more of the medicine to have the same effect), physical dependence and slowed breathing.    Store your pills in a secure place, locked if possible. We will not replace any lost or stolen medicine. If you don t finish your medicine, please throw away (dispose) as directed by your pharmacist. The [place] [employer] has more information about safe disposal: https://www.pca.state.[place]/living-green/managing-unwanted-medications        Prescriptions were sent or printed at these locations (1 Prescription)                   Other Prescriptions                Printed at Department/Unit printer (1 of 1)         oxyCODONE IR (ROXICODONE) 5 MG tablet                Procedures and tests performed during your visit     Alcohol ethyl    CBC with platelets differential    CT Abdomen Pelvis w Contrast    Comprehensive metabolic panel    Lipase      Orders Needing Specimen Collection     Ordered          09/27/18 2251  UA with Microscopic - STAT, [facility]: STAT, Needs to be Collected     Scheduled Task Status   09/27/18 2251 Collect UA with Microscopic Open   Order Class:  PCU Collect                  Pending Results     Date and Time Order Name Status Description    9/27/2018 2251 CT Abdomen Pelvis w Contrast Preliminary             Pending Culture Results     No orders found for last 3 day(s).            Pending Results Instructions     If you had any lab results that were not finalized at the time of your Discharge, you can call the ED Lab Result RN at [phone]. You will be contacted by this team for any positive Lab results or changes in treatment. The nurses are available 7 days a week from 10A to 6:30P.  You can leave a message 24 hours per day and they will return your call.        Thank you for choosing [facility]        Thank you for choosing [facility] for your care. Our goal is always to provide you with excellent care. Hearing back from our patients is one way we can continue to improve our services. Please take a few minutes to complete the written survey that you may receive in the mail after you visit with us. Thank you!        [employer] Information     [employer] gives you secure access to your electronic health record. If you see a primary care provider, you can also send messages to your care team and make appointments. If you have questions, please call your primary care clinic.  If you do not have a primary care provider, please call [phone] and they will assist you.        Care EveryWhere ID     This is your Care EveryWhere ID. This could be used by other organizations to access your [facility] medical records  [id number]        Equal Access to Services     RA JORDAN : Apollo Cody, maverick centeno, luis enrique reyna, dez salazar. So [facility] [phone].    ATENCIÓN: Si habla español, tiene a kohli disposición servicios gratuitos de asistencia lingüística. Llame al [phone].    We comply with applicable federal civil rights laws and [place] laws. We do not discriminate on the basis of race, color, national origin, age, disability, sex, sexual orientation, or gender identity.            After Visit Summary       This is your record. Keep this with you and show to your community pharmacist(s) and doctor(s) at your next visit.

## 2018-09-28 VITALS
OXYGEN SATURATION: 98 % | SYSTOLIC BLOOD PRESSURE: 128 MMHG | WEIGHT: 175 LBS | TEMPERATURE: 98.9 F | DIASTOLIC BLOOD PRESSURE: 68 MMHG | RESPIRATION RATE: 16 BRPM | HEIGHT: 64 IN | BODY MASS INDEX: 29.88 KG/M2

## 2018-09-28 PROCEDURE — 25000125 ZZHC RX 250: Performed by: EMERGENCY MEDICINE

## 2018-09-28 PROCEDURE — 25000132 ZZH RX MED GY IP 250 OP 250 PS 637: Performed by: EMERGENCY MEDICINE

## 2018-09-28 PROCEDURE — 74177 CT ABD & PELVIS W/CONTRAST: CPT

## 2018-09-28 PROCEDURE — 25000128 H RX IP 250 OP 636: Performed by: EMERGENCY MEDICINE

## 2018-09-28 RX ORDER — OXYCODONE HYDROCHLORIDE 5 MG/1
5 TABLET ORAL EVERY 6 HOURS PRN
Qty: 8 TABLET | Refills: 0 | Status: SHIPPED | OUTPATIENT
Start: 2018-09-28 | End: 2018-10-05

## 2018-09-28 RX ADMIN — LIDOCAINE HYDROCHLORIDE 30 ML: 20 SOLUTION ORAL; TOPICAL at 01:26

## 2018-09-28 RX ADMIN — IOPAMIDOL 107 ML: 755 INJECTION, SOLUTION INTRAVENOUS at 00:01

## 2018-09-28 RX ADMIN — SODIUM CHLORIDE, PRESERVATIVE FREE 77 ML: 5 INJECTION INTRAVENOUS at 00:01

## 2018-09-28 NOTE — ED TRIAGE NOTES
Pt. Presents to ED with c/o abdominal pain, bloating, and nausea that started around lunch. Pt. Is lethargic and falling asleep in triage. Denies ETOH or drug use. AVSS on RA. Independent.

## 2018-09-28 NOTE — DISCHARGE INSTRUCTIONS
Please make an appointment to follow up with Your Primary Care Provider as soon as possible.    Continue home antacids as prescribed.  Avoid NSAIDs such as ibuprofen, naproxen, aleve, etc.  You may use oxycodone sparingly for break through pain.      If you have worsening pain, intractable vomiting, fevers, or other concerns, return to the emergency department for re-evaluation .         Abdominal Pain    Abdominal pain is pain in the stomach or belly area. Everyone has this pain from time to time. In many cases it goes away on its own. But abdominal pain can sometimes be due to a serious problem, such as appendicitis. So it s important to know when to seek help.  Causes of abdominal pain  There are many possible causes of abdominal pain. Common causes in adults include:    Constipation, diarrhea, or gas    Stomach acid flowing back up into the esophagus (acid reflux or heartburn)    Severe acid reflux, called GERD (gastroesophageal reflux disease)    A sore in the lining of the stomach or small intestine (peptic ulcer)    Inflammation of the gallbladder, liver, or pancreas    Gallstones or kidney stones    Appendicitis     Intestinal blockage     An internal organ pushing through a muscle or other tissue (hernia)    Urinary tract infections    In women, menstrual cramps, fibroids, or endometriosis    Inflammation or infection of the intestines  Diagnosing the cause of abdominal pain  Your healthcare provider will do a physical exam help find the cause of your pain. If needed, tests will be ordered. Belly pain has many possible causes. So it can be hard to find the reason for your pain. Giving details about your pain can help. Tell your provider where and when you feel the pain, and what makes it better or worse. Also let your provider know if you have other symptoms such as:    Fever    Tiredness    Upset stomach (nausea)    Vomiting    Changes in bathroom habits  Treating abdominal pain  Some causes of pain need  emergency medical treatment right away. These include appendicitis or a bowel blockage. Other problems can be treated with rest, fluids, or medicines. Your healthcare provider can give you specific instructions for treatment or self-care based on what is causing your pain.  If you have vomiting or diarrhea, sip water or other clear fluids. When you are ready to eat solid foods again, start with small amounts of easy-to-digest, low-fat foods. These include apple sauce, toast, or crackers.   When to seek medical care  Call 911 or go to the hospital right away if you:    Can t pass stool and are vomiting    Are vomiting blood or have bloody diarrhea or black, tarry diarrhea    Have chest, neck, or shoulder pain    Feel like you might pass out    Have pain in your shoulder blades with nausea    Have sudden, severe belly pain    Have new, severe pain unlike any you have felt before    Have a belly that is rigid, hard, and tender to touch  Call your healthcare provider if you have:    Pain for more than 5 days    Bloating for more than 2 days    Diarrhea for more than 5 days    A fever of 100.4 F (38 C) or higher, or as directed by your healthcare provider    Pain that gets worse    Weight loss for no reason    Continued lack of appetite    Blood in your stool  How to prevent abdominal pain  Here are some tips to help prevent abdominal pain:    Eat smaller amounts of food at one time.    Avoid greasy, fried, or other high-fat foods.    Avoid foods that give you gas.    Exercise regularly.    Drink plenty of fluids.  To help prevent GERD symptoms:    Quit smoking.    Reduce alcohol and certain foods that increase stomach acid.    Avoid aspirin and over-the-counter pain and fever medicines (NSAIDS or nonsteroidal anti-inflammatory drugs), if possible    Lose extra weight.    Finish eating at least 2 hours before you go to bed or lie down.    Raise the head of your bed.  Date Last Reviewed: 7/1/2016 2000-2017 [employer]. [address] [place] [address], [place] [address]. All rights reserved. This information is not intended as a substitute for professional medical care. Always follow your healthcare professional's instructions.

## 2018-09-28 NOTE — ED PROVIDER NOTES
[place] EMERGENCY DEPARTMENT ([facility])  9/27/18   History     Chief Complaint   Patient presents with     Abdominal Pain     Nausea     The history is provided by the patient and medical records.     Andrew Lockwood is a 50 year old male with a medical history significant for HIV/AIDS, type 2 diabetes mellitus, CNS toxoplasmosis, chronic abdominal pain and is s/p appendectomy who presents to the Emergency Department for evaluation of abdominal pain, nausea and vomiting.  Patient reports he has been having constant abdominal cramping as well as nausea and vomiting since eating lunch today.  Patient reports eating a salad for lunch.  Patient reports that the cramping is diffuse throughout his abdomen.  He denies any fevers.  He reports that his last bowel movement was at 10 AM this morning and reports that it was normal.  He has had multiple episodes of vomiting.  He denies any blood in the stool and denies any black stools.  He denies any blood in his vomit.  He denies any drug or alcohol use.      Per review of patient's chart, patient was last hospitalized here from 9/10/2018 to 9/13/2018 for a partial SBO.  Patient was managed conservatively with NG tube and bowel rest.  Patient's abdominal pain was managed with IV Dilaudid initially and transitioned to oral oxycodone.    I have reviewed the Medications, Allergies, Past Medical and Surgical History, and Social History in the [facility] system.    Past Medical History:   Diagnosis Date     AIDS (H)      Allergic rhinitis due to other allergen     DNS     Chronic abdominal pain      CNS toxoplasmosis (H)      Diabetes type 2, controlled (H)      GERD (gastroesophageal reflux disease)      HIV (human immunodeficiency virus infection) (H)      Periungual wart      Sleep apnea     doesn't use CPAP       Past Surgical History:   Procedure Laterality Date     C NONSPECIFIC PROCEDURE      right forearm fracture     COLONOSCOPY Left 1/22/2016    Procedure: COMBINED  COLONOSCOPY, SINGLE OR MULTIPLE BIOPSY/POLYPECTOMY BY BIOPSY;  Surgeon: Clark Saini MD;  Location: [facility]     HC EXPLORE UNDESC TESTIS,INGUIN/SCROTAL       LAPAROSCOPIC APPENDECTOMY N/A 1/31/2018    Procedure: LAPAROSCOPIC APPENDECTOMY;  LAPAROSCOPIC APPENDECTOMY;  Surgeon: Dawn Hotl MD;  Location: [facility] OR     LAPAROSCOPY DIAGNOSTIC (GENERAL) N/A 7/26/2016    Procedure: LAPAROSCOPY DIAGNOSTIC (GENERAL);  Surgeon: Susannah Arriaga MD;  Location: [facility] OR     LAPAROSCOPY DIAGNOSTIC (GENERAL) N/A 4/16/2018    Procedure: LAPAROSCOPY DIAGNOSTIC (GENERAL);  Diagnostic laparoscopy and lysis of adhesions;  Surgeon: Prince Dowling MD;  Location: [facility] OR     OPTICAL TRACKING SYSTEM CRANIOTOMY, EXCISE TUMOR, COMBINED Left 4/10/2015    Procedure: COMBINED OPTICAL TRACKING SYSTEM CRANIOTOMY, EXCISE TUMOR;  Surgeon: Mirlande Colmenares MD;  Location: [facility] OR     REPAIR GAMEKEEPER'S THUMB Right 12/2/2016    Procedure: REPAIR LIGAMENT ULNAR COLLATERAL THUMB (GAMEKEEPER'S);  Surgeon: Evin Zamorano MD;  Location[facility]       Family History   Problem Relation Age of Onset     Diabetes Brother      Diabetes Father      Alzheimer Disease Father      Unknown/Adopted Mother      Diabetes Paternal Grandfather      Cancer No family hx of      no skin cancer     Skin Cancer No family hx of      no famiy hx of skin cancer     Glaucoma No family hx of      Macular Degeneration No family hx of        Social History   Substance Use Topics     Smoking status: Current Every Day Smoker     Packs/day: 0.50     Types: Cigarettes     Last attempt to quit: 10/28/2016     Smokeless tobacco: Former User      Comment: quit Aug 2018     Alcohol use No      Comment: Last etoh in 2007       Current Facility-Administered Medications   Medication     0.9% sodium chloride BOLUS    Followed by     sodium chloride 0.9% infusion     morphine (PF) injection 4 mg     ondansetron (ZOFRAN) injection 4 mg     Current Outpatient  "Prescriptions   Medication     Bictegravir-Emtricitab-Tenofov -25 MG TABS     Bismuth Subsalicylate (KAOPECTATE PO)     blood glucose (NO BRAND SPECIFIED) lancets standard     blood glucose monitoring (NO BRAND SPECIFIED) meter device kit     blood glucose monitoring (NO BRAND SPECIFIED) test strip     empagliflozin (JARDIANCE) 10 MG TABS tablet     insulin glargine (LANTUS SOLOSTAR) 100 UNIT/ML pen     insulin pen needle (B-D U/F) 31G X 8 MM     methylPREDNISolone (MEDROL DOSEPAK) 4 MG tablet     omeprazole (PRILOSEC) 20 MG CR capsule     ondansetron (ZOFRAN) 4 MG tablet     ranitidine (ZANTAC) 150 MG tablet     tiZANidine (ZANAFLEX) 4 MG tablet     triamcinolone (KENALOG) 0.025 % cream        Allergies   Allergen Reactions     Metformin      Abdominal pain     Milk [Lac Bovis] Hives     Tylenol [Acetaminophen] Itching     Dulaglutide Rash         Review of Systems   Constitutional: Negative for fever.   Gastrointestinal: Positive for abdominal pain (cramping), nausea and vomiting. Negative for blood in stool, constipation and diarrhea.   All other systems reviewed and are negative.      Physical Exam   BP: (!) 140/91  Heart Rate: 94  Temp: 98.9  F (37.2  C)  Resp: 16  Height: 162.6 cm (5' 4\")  Weight: 79.4 kg (175 lb)  SpO2: 98 %      Physical Exam   General: patient is alert and oriented, moaning in discomfort  Head: atraumatic and normocephalic   EENT: Tacky mucus membranes without tonsillar erythema or exudates, sclerae anicteric  Neck: supple   Cardiovascular: regular rate and rhythm, extremities warm and well perfused, no lower extremity edema  Pulmonary: lungs clear to auscultation bilaterally   Abdomen: soft, diffusely tender to palpation particularly in the epigastrium without guarding, no CVA tenderness  Musculoskeletal: normal range of motion   Neurological: alert and oriented, moving all extremities symmetrically, gait normal   Skin: warm, dry       ED Course   10:42 PM  The patient was seen and " examined by Sharlene Tobin MD in [facility]     ED Course     Procedures             Critical Care time:  none             Labs Ordered and Resulted from Time of ED Arrival Up to the Time of Departure from the ED - No data to display         Assessments & Plan (with Medical Decision Making)   Mr. Lockwood is a 50 year old male with a medical history significant for HIV/AIDS, type 2 diabetes mellitus, CNS toxoplasmosis, chronic abdominal pain and is s/p appendectomy who presents to the Emergency Department for evaluation of abdominal pain, nausea and vomiting.  Patient does appear to be quite uncomfortable but is hemodynamically stable and afebrile.  He did go for CT to evaluate for evidence of obstruction given his recent episode of bowel obstruction and numerous episodes of vomiting.  He does not have signs of obstruction though does have signs of gastritis.  His labs show no leukocytosis or elevation in LFTs.  Lipase is within normal limits.  He was treated with IV fluids, morphine, Zofran and a GI cocktail.  He reports feeling significantly improved and is tolerating p.o.  He does feel comfortable discharging to home at this time.  He reports he is on multiple antacids at home as sent and is encouraged to continue his currently prescribed antacid regimen.  He will call his primary care provider to schedule follow-up.  He does also have GI follow-up within the next month as well.  Patient was given close return instructions for the emergency department and voiced understanding.    I have reviewed the nursing notes.    I have reviewed the findings, diagnosis, plan and need for follow up with the patient.    New Prescriptions    OXYCODONE IR (ROXICODONE) 5 MG TABLET    Take 1 tablet (5 mg) by mouth every 6 hours as needed for pain       Final diagnoses:   Abdominal pain, generalized     I, Yunior Mora, henry serving as a trained medical scribe to document services personally performed by Sharlene Tobin MD, based on the  provider's statements to me.   I, Sharlene Tobin MD, was physically present and have reviewed and verified the accuracy of this note documented by Yunior Mora.    9/27/2018   [facility], [place], EMERGENCY DEPARTMENT     Sharlene Tobin MD  09/28/18 0238

## 2018-10-04 ENCOUNTER — OFFICE VISIT (OUTPATIENT)
Dept: INFECTIOUS DISEASES | Facility: CLINIC | Age: 53
End: 2018-10-04

## 2018-10-04 ENCOUNTER — OFFICE VISIT (OUTPATIENT)
Dept: INFECTIOUS DISEASES | Facility: CLINIC | Age: 53
End: 2018-10-04
Attending: INTERNAL MEDICINE
Payer: COMMERCIAL

## 2018-10-04 DIAGNOSIS — L08.9 SKIN INFECTION: Primary | ICD-10-CM

## 2018-10-04 DIAGNOSIS — L08.9 WOUND INFECTION: Primary | ICD-10-CM

## 2018-10-04 DIAGNOSIS — Z53.9 ERRONEOUS ENCOUNTER--DISREGARD: Primary | ICD-10-CM

## 2018-10-04 DIAGNOSIS — T14.8XXA WOUND INFECTION: Primary | ICD-10-CM

## 2018-10-04 LAB
GRAM STN SPEC: NORMAL
GRAM STN SPEC: NORMAL
Lab: NORMAL
SPECIMEN SOURCE: NORMAL

## 2018-10-04 PROCEDURE — 87205 SMEAR GRAM STAIN: CPT | Performed by: INTERNAL MEDICINE

## 2018-10-04 PROCEDURE — 87077 CULTURE AEROBIC IDENTIFY: CPT | Performed by: INTERNAL MEDICINE

## 2018-10-04 PROCEDURE — 87186 SC STD MICRODIL/AGAR DIL: CPT | Performed by: INTERNAL MEDICINE

## 2018-10-04 PROCEDURE — 87070 CULTURE OTHR SPECIMN AEROBIC: CPT | Performed by: INTERNAL MEDICINE

## 2018-10-04 RX ORDER — DOXYCYCLINE 100 MG/1
100 CAPSULE ORAL 2 TIMES DAILY
Qty: 20 CAPSULE | Refills: 0 | Status: ON HOLD | OUTPATIENT
Start: 2018-10-04 | End: 2018-10-09

## 2018-10-04 NOTE — PROGRESS NOTES
10/4/2018  Infectious Diseases Brief Note:     Andrew stopped by clinic due to a lesion on his right arm at the site of previous insect bite. I briefly saw him to evaluate it. He has a 2 cm indurated, scabbed lesion with small amounts of purulent drainage. No systemic symptoms. This is most likely a S. aureus infection. A swab was obtained for culture and sensitivities. In the interim, I started him on doxycycline (he has been on frequent courses of Bactrim in the past so may have resistance). He is currently homeless but reports that he is available via email should we need to change his antibiotic based on culture results.     Davina Lang MD  Infectious Diseases  [phone]

## 2018-10-04 NOTE — MR AVS SNAPSHOT
After Visit Summary   10/4/2018    Andrew Lockwood    MRN: [id number]           Patient Information     Date Of Birth          11/27/1967        Visit Information        Provider Department      10/4/2018 9:45 AM Davina Lang MD [facility] and Infectious Diseases        Today's Diagnoses     ERRONEOUS ENCOUNTER--DISREGARD    -  1       Follow-ups after your visit        Your next 10 appointments already scheduled     Oct 08, 2018  1:00 PM CDT   (Arrive by 12:45 PM)   Return Visit with Rekha Membreno MD   [facility] ([facility] [place])    [address] [place] [address]  4th Floor  [facility] [address] [phone]              Who to contact     If you have questions or need follow up information about today's clinic visit or your schedule please contact [facility] AND INFECTIOUS DISEASES directly at [phone].  Normal or non-critical lab and imaging results will be communicated to you by MyChart, letter or phone within 4 business days after the clinic has received the results. If you do not hear from us within 7 days, please contact the clinic through MyChart or phone. If you have a critical or abnormal lab result, we will notify you by phone as soon as possible.  Submit refill requests through [employer] or call your pharmacy and they will forward the refill request to us. Please allow 3 business days for your refill to be completed.          Additional Information About Your Visit        MyChart Information     [employer] gives you secure access to your electronic health record. If you see a primary care provider, you can also send messages to your care team and make appointments. If you have questions, please call your primary care clinic.  If you do not have a primary care provider, please call [phone] and they will assist you.        Care EveryWhere ID     This is your Care EveryWhere ID. This could be used by other  organizations to access your [place] medical records  [id number]         Blood Pressure from Last 3 Encounters:   10/05/18 124/77   09/28/18 128/68   09/19/18 126/88    Weight from Last 3 Encounters:   10/05/18 78.5 kg (173 lb)   09/27/18 79.4 kg (175 lb)   09/26/18 77.1 kg (170 lb)              Today, you had the following     No orders found for display         Today's Medication Changes          These changes are accurate as of 10/4/18 11:59 PM.  If you have any questions, ask your nurse or doctor.               Start taking these medicines.        Dose/Directions    doxycycline 100 MG capsule   Commonly known as:  VIBRAMYCIN   Used for:  Skin infection   Started by:  Davina Lang MD        Dose:  100 mg   Take 1 capsule (100 mg) by mouth 2 times daily   Quantity:  20 capsule   Refills:  0            Where to get your medicines      These medications were sent to [address] [place] [address] [address] [place] [id number]    Hours:  TRANSPLANT PHONE NUMBER [phone] Phone:  [phone]     doxycycline 100 MG capsule                Primary Care Provider Fax #    Physician No Ref-Primary [phone]       No address on file        Equal Access to Services     RA JORDNA AH: Apollo powero Soorenali, waaxda luqadaha, qaybta kaalmada adeegyada, dez salazar. So [facility] [phone].    ATENCIÓN: Si habla español, tiene a kohli disposición servicios gratuitos de asistencia lingüística. Llame al [phone].    We comply with applicable federal civil rights laws and [place] laws. We do not discriminate on the basis of race, color, national origin, age, disability, sex, sexual orientation, or gender identity.            Thank you!     Thank you for choosing [facility] AND INFECTIOUS DISEASES  for your care. Our goal is always to provide you with excellent care. Hearing back from our  patients is one way we can continue to improve our services. Please take a few minutes to complete the written survey that you may receive in the mail after your visit with us. Thank you!             Your Updated Medication List - Protect others around you: Learn how to safely use, store and throw away your medicines at www.disposemymeds.org.          This list is accurate as of 10/4/18 11:59 PM.  Always use your most recent med list.                   Brand Name Dispense Instructions for use Diagnosis    bictegravir-emtricitabine-tenofovir -25 MG per tablet    BIKTARVY    30 tablet    Take 1 tablet by mouth daily    HIV (human immunodeficiency virus infection) (H)       blood glucose lancets standard    no brand specified    100 Box    Use to test blood sugar four times daily or as directed.    Type 2 diabetes mellitus without complication, without long-term current use of insulin (H)       blood glucose monitoring meter device kit    no brand specified    1 kit    Use to test blood sugar 4 times daily or as directed.    Type 2 diabetes mellitus with complication, without long-term current use of insulin (H)       blood glucose monitoring test strip    no brand specified    100 strip    1 strip by In Vitro route 4 times daily (before meals and nightly) Use to test blood sugars 4 times daily or as directed    Type 2 diabetes mellitus with complication, without long-term current use of insulin (H)       doxycycline 100 MG capsule    VIBRAMYCIN    20 capsule    Take 1 capsule (100 mg) by mouth 2 times daily    Skin infection       empagliflozin 10 MG Tabs tablet    JARDIANCE    90 tablet    Take 1 tablet by mouth daily    Diabetes mellitus, type 2 (H)       insulin pen needle 31G X 8 MM    B-D U/F    100 each    Use 1 pen needles daily or as directed.    Type 2 diabetes mellitus without complication, without long-term current use of insulin (H)       KAOPECTATE PO      Take by mouth as needed        LANTUS  SOLOSTAR 100 UNIT/ML injection   Generic drug:  insulin glargine     15 mL    Inject 50 Units Subcutaneous every morning    Type 2 diabetes mellitus with hyperglycemia, without long-term current use of insulin (H)       methylPREDNISolone 4 MG tablet    MEDROL DOSEPAK    21 tablet    Follow package instructions    Chronic right shoulder pain, Right foot pain       omeprazole 20 MG CR capsule    priLOSEC    180 capsule    Take 1 capsule (20 mg) by mouth 2 times daily    Heart burn       ondansetron 4 MG tablet    ZOFRAN    10 tablet    Take 1 tablet (4 mg) by mouth every 8 hours as needed for nausea        oxyCODONE IR 5 MG tablet    ROXICODONE    8 tablet    Take 1 tablet (5 mg) by mouth every 6 hours as needed for pain        ranitidine 150 MG tablet    ZANTAC    60 tablet    Take 1 tablet (150 mg) by mouth 2 times daily    Heart burn       tiZANidine 4 MG tablet    ZANAFLEX    10 tablet    Take 1 tablet (4 mg) by mouth 3 times daily    Chronic right shoulder pain, Right foot pain       triamcinolone 0.025 % cream    KENALOG    15 g    Apply topically 2 times daily    Folliculitis

## 2018-10-04 NOTE — MR AVS SNAPSHOT
After Visit Summary   10/4/2018    Andrew Lockwood    MRN: [id number]           Patient Information     Date Of Birth          11/27/1967        Visit Information        Provider Department      10/4/2018 8:30 AM Davina Lang MD [facility] and Infectious Diseases        Today's Diagnoses     Wound infection    -  1       Follow-ups after your visit        Your next 10 appointments already scheduled     Oct 08, 2018  1:00 PM CDT   (Arrive by 12:45 PM)   Return Visit with Rekha Membreno MD   [facility] ([facility] [place])    [address] [place] [address]  [address] [facility] [address] [phone]              Who to contact     If you have questions or need follow up information about today's clinic visit or your schedule please contact [facility] AND INFECTIOUS DISEASES directly at [phone].  Normal or non-critical lab and imaging results will be communicated to you by MyChart, letter or phone within 4 business days after the clinic has received the results. If you do not hear from us within 7 days, please contact the clinic through [employer] or phone. If you have a critical or abnormal lab result, we will notify you by phone as soon as possible.  Submit refill requests through [employer] or call your pharmacy and they will forward the refill request to us. Please allow 3 business days for your refill to be completed.          Additional Information About Your Visit        MyChart Information     [employer] gives you secure access to your electronic health record. If you see a primary care provider, you can also send messages to your care team and make appointments. If you have questions, please call your primary care clinic.  If you do not have a primary care provider, please call [phone] and they will assist you.        Care EveryWhere ID     This is your Care EveryWhere ID. This could be used by other organizations to access  your [place] medical records  [id number]         Blood Pressure from Last 3 Encounters:   09/28/18 128/68   09/19/18 126/88   09/19/18 126/88    Weight from Last 3 Encounters:   09/27/18 79.4 kg (175 lb)   09/26/18 77.1 kg (170 lb)   09/19/18 78.9 kg (174 lb)              We Performed the Following     Gram stain     Wound Culture Aerobic Bacterial          Today's Medication Changes          These changes are accurate as of 10/4/18  5:09 PM.  If you have any questions, ask your nurse or doctor.               Start taking these medicines.        Dose/Directions    doxycycline 100 MG capsule   Commonly known as:  VIBRAMYCIN   Used for:  Skin infection   Started by:  Davina Lang MD        Dose:  100 mg   Take 1 capsule (100 mg) by mouth 2 times daily   Quantity:  20 capsule   Refills:  0            Where to get your medicines      These medications were sent to [address] [place] [address] [address] [place], [place] [id number]    Hours:  TRANSPLANT PHONE NUMBER [phone] Phone:  [phone]     doxycycline 100 MG capsule                Primary Care Provider Fax #    Physician No Ref-Primary [phone]       No address on file        Equal Access to Services     RA JORDAN AH: Apollo powero Soorenali, waaxda luqadaha, qaybta kaalmada adeegyada, dez salazar. So [facility] [phone].    ATENCIÓN: Si habla español, tiene a kohli disposición servicios gratuitos de asistencia lingüística. Llame al [phone].    We comply with applicable federal civil rights laws and [place] laws. We do not discriminate on the basis of race, color, national origin, age, disability, sex, sexual orientation, or gender identity.            Thank you!     Thank you for choosing [facility] AND INFECTIOUS DISEASES  for your care. Our goal is always to provide you with excellent care. Hearing back from our patients is  one way we can continue to improve our services. Please take a few minutes to complete the written survey that you may receive in the mail after your visit with us. Thank you!             Your Updated Medication List - Protect others around you: Learn how to safely use, store and throw away your medicines at www.disposemymeds.org.          This list is accurate as of 10/4/18  5:09 PM.  Always use your most recent med list.                   Brand Name Dispense Instructions for use Diagnosis    bictegravir-emtricitabine-tenofovir -25 MG per tablet    BIKTARVY    30 tablet    Take 1 tablet by mouth daily    HIV (human immunodeficiency virus infection) (H)       blood glucose lancets standard    no brand specified    100 Box    Use to test blood sugar four times daily or as directed.    Type 2 diabetes mellitus without complication, without long-term current use of insulin (H)       blood glucose monitoring meter device kit    no brand specified    1 kit    Use to test blood sugar 4 times daily or as directed.    Type 2 diabetes mellitus with complication, without long-term current use of insulin (H)       blood glucose monitoring test strip    no brand specified    100 strip    1 strip by In Vitro route 4 times daily (before meals and nightly) Use to test blood sugars 4 times daily or as directed    Type 2 diabetes mellitus with complication, without long-term current use of insulin (H)       doxycycline 100 MG capsule    VIBRAMYCIN    20 capsule    Take 1 capsule (100 mg) by mouth 2 times daily    Skin infection       empagliflozin 10 MG Tabs tablet    JARDIANCE    90 tablet    Take 1 tablet by mouth daily    Diabetes mellitus, type 2 (H)       insulin pen needle 31G X 8 MM    B-D U/F    100 each    Use 1 pen needles daily or as directed.    Type 2 diabetes mellitus without complication, without long-term current use of insulin (H)       KAOPECTATE PO      Take by mouth as needed        LANTUS SOLOSTAR 100  UNIT/ML injection   Generic drug:  insulin glargine     15 mL    Inject 50 Units Subcutaneous every morning    Type 2 diabetes mellitus with hyperglycemia, without long-term current use of insulin (H)       methylPREDNISolone 4 MG tablet    MEDROL DOSEPAK    21 tablet    Follow package instructions    Chronic right shoulder pain, Right foot pain       omeprazole 20 MG CR capsule    priLOSEC    180 capsule    Take 1 capsule (20 mg) by mouth 2 times daily    Heart burn       ondansetron 4 MG tablet    ZOFRAN    10 tablet    Take 1 tablet (4 mg) by mouth every 8 hours as needed for nausea        oxyCODONE IR 5 MG tablet    ROXICODONE    8 tablet    Take 1 tablet (5 mg) by mouth every 6 hours as needed for pain        ranitidine 150 MG tablet    ZANTAC    60 tablet    Take 1 tablet (150 mg) by mouth 2 times daily    Heart burn       tiZANidine 4 MG tablet    ZANAFLEX    10 tablet    Take 1 tablet (4 mg) by mouth 3 times daily    Chronic right shoulder pain, Right foot pain       triamcinolone 0.025 % cream    KENALOG    15 g    Apply topically 2 times daily    Folliculitis

## 2018-10-04 NOTE — LETTER
10/4/2018     RE: Andrew Lockwood  [address] [facility] [address]     Dear Colleague,    Thank you for referring your patient, Andrew Lockwood, to the [facility] AND INFECTIOUS DISEASES at [facility]. Please see a copy of my visit note below.      This encounter was opened in error. Please disregard.    Again, thank you for allowing me to participate in the care of your patient.      Sincerely,    Davina Lang MD

## 2018-10-04 NOTE — LETTER
10/4/2018       RE: Andrew Lockwood  [address]  [facility] [address]     Dear Colleague,    Thank you for referring your patient, Andrew Lockwood, to the [facility] AND INFECTIOUS DISEASES at [facility]. Please see a copy of my visit note below.    10/4/2018  Infectious Diseases Brief Note:     Andrew stopped by clinic due to a lesion on his right arm at the site of previous insect bite. I briefly saw him to evaluate it. He has a 2 cm indurated, scabbed lesion with small amounts of purulent drainage. No systemic symptoms. This is most likely a S. aureus infection. A swab was obtained for culture and sensitivities. In the interim, I started him on doxycycline (he has been on frequent courses of Bactrim in the past so may have resistance). He is currently homeless but reports that he is available via email should we need to change his antibiotic based on culture results.     Davina Lang MD  Infectious Diseases  [phone]

## 2018-10-05 ENCOUNTER — APPOINTMENT (OUTPATIENT)
Dept: GENERAL RADIOLOGY | Facility: CLINIC | Age: 53
End: 2018-10-05
Attending: EMERGENCY MEDICINE
Payer: COMMERCIAL

## 2018-10-05 ENCOUNTER — APPOINTMENT (OUTPATIENT)
Dept: CT IMAGING | Facility: CLINIC | Age: 53
End: 2018-10-05
Attending: EMERGENCY MEDICINE
Payer: COMMERCIAL

## 2018-10-05 ENCOUNTER — HOSPITAL ENCOUNTER (INPATIENT)
Facility: CLINIC | Age: 53
LOS: 5 days | Discharge: HOME | End: 2018-10-10
Attending: EMERGENCY MEDICINE | Admitting: INTERNAL MEDICINE
Payer: COMMERCIAL

## 2018-10-05 DIAGNOSIS — R10.9 CHRONIC ABDOMINAL PAIN: ICD-10-CM

## 2018-10-05 DIAGNOSIS — R11.2 NAUSEA AND VOMITING, INTRACTABILITY OF VOMITING NOT SPECIFIED, UNSPECIFIED VOMITING TYPE: ICD-10-CM

## 2018-10-05 DIAGNOSIS — K21.00 GASTROESOPHAGEAL REFLUX DISEASE WITH ESOPHAGITIS: Primary | ICD-10-CM

## 2018-10-05 DIAGNOSIS — K56.609 INTESTINAL OBSTRUCTION, UNSPECIFIED CAUSE, UNSPECIFIED WHETHER PARTIAL OR COMPLETE (H): ICD-10-CM

## 2018-10-05 DIAGNOSIS — L03.113 CELLULITIS OF RIGHT ARM: ICD-10-CM

## 2018-10-05 DIAGNOSIS — E11.65 TYPE 2 DIABETES MELLITUS WITH HYPERGLYCEMIA, WITHOUT LONG-TERM CURRENT USE OF INSULIN (H): ICD-10-CM

## 2018-10-05 DIAGNOSIS — K59.00 CONSTIPATION, UNSPECIFIED CONSTIPATION TYPE: ICD-10-CM

## 2018-10-05 DIAGNOSIS — R11.0 NAUSEA: ICD-10-CM

## 2018-10-05 DIAGNOSIS — K56.0 ADYNAMIC ILEUS (H): ICD-10-CM

## 2018-10-05 DIAGNOSIS — G89.29 CHRONIC ABDOMINAL PAIN: ICD-10-CM

## 2018-10-05 LAB
ALBUMIN SERPL-MCNC: 3.8 G/DL (ref 3.4–5)
ALBUMIN UR-MCNC: NEGATIVE MG/DL
ALP SERPL-CCNC: 144 U/L (ref 40–150)
ALT SERPL W P-5'-P-CCNC: 25 U/L (ref 0–70)
ANION GAP SERPL CALCULATED.3IONS-SCNC: 10 MMOL/L (ref 3–14)
APPEARANCE UR: CLEAR
AST SERPL W P-5'-P-CCNC: 15 U/L (ref 0–45)
BASOPHILS # BLD AUTO: 0 10E9/L (ref 0–0.2)
BASOPHILS NFR BLD AUTO: 0.3 %
BILIRUB SERPL-MCNC: 0.7 MG/DL (ref 0.2–1.3)
BILIRUB UR QL STRIP: NEGATIVE
BUN SERPL-MCNC: 13 MG/DL (ref 7–30)
CALCIUM SERPL-MCNC: 9.6 MG/DL (ref 8.5–10.1)
CHLORIDE SERPL-SCNC: 104 MMOL/L (ref 94–109)
CO2 SERPL-SCNC: 24 MMOL/L (ref 20–32)
COLOR UR AUTO: ABNORMAL
CREAT SERPL-MCNC: 0.81 MG/DL (ref 0.66–1.25)
CRP SERPL-MCNC: 14 MG/L (ref 0–8)
DIFFERENTIAL METHOD BLD: NORMAL
EOSINOPHIL # BLD AUTO: 0.3 10E9/L (ref 0–0.7)
EOSINOPHIL NFR BLD AUTO: 2.4 %
ERYTHROCYTE [DISTWIDTH] IN BLOOD BY AUTOMATED COUNT: 13.8 % (ref 10–15)
ERYTHROCYTE [SEDIMENTATION RATE] IN BLOOD BY WESTERGREN METHOD: 23 MM/H (ref 0–20)
GFR SERPL CREATININE-BSD FRML MDRD: >90 ML/MIN/1.7M2
GLUCOSE BLDC GLUCOMTR-MCNC: 106 MG/DL (ref 70–99)
GLUCOSE BLDC GLUCOMTR-MCNC: 124 MG/DL (ref 70–99)
GLUCOSE BLDC GLUCOMTR-MCNC: 132 MG/DL (ref 70–99)
GLUCOSE SERPL-MCNC: 225 MG/DL (ref 70–99)
GLUCOSE UR STRIP-MCNC: >1000 MG/DL
HCT VFR BLD AUTO: 44.7 % (ref 40–53)
HGB BLD-MCNC: 15.7 G/DL (ref 13.3–17.7)
HGB UR QL STRIP: NEGATIVE
IMM GRANULOCYTES # BLD: 0.1 10E9/L (ref 0–0.4)
IMM GRANULOCYTES NFR BLD: 0.9 %
KETONES UR STRIP-MCNC: 5 MG/DL
LACTATE BLD-SCNC: 1.1 MMOL/L (ref 0.7–2)
LEUKOCYTE ESTERASE UR QL STRIP: NEGATIVE
LIPASE SERPL-CCNC: 219 U/L (ref 73–393)
LYMPHOCYTES # BLD AUTO: 2.4 10E9/L (ref 0.8–5.3)
LYMPHOCYTES NFR BLD AUTO: 22.8 %
MCH RBC QN AUTO: 31.5 PG (ref 26.5–33)
MCHC RBC AUTO-ENTMCNC: 35.1 G/DL (ref 31.5–36.5)
MCV RBC AUTO: 90 FL (ref 78–100)
MONOCYTES # BLD AUTO: 0.6 10E9/L (ref 0–1.3)
MONOCYTES NFR BLD AUTO: 6.1 %
NEUTROPHILS # BLD AUTO: 7 10E9/L (ref 1.6–8.3)
NEUTROPHILS NFR BLD AUTO: 67.5 %
NITRATE UR QL: NEGATIVE
NRBC # BLD AUTO: 0 10*3/UL
NRBC BLD AUTO-RTO: 0 /100
PH UR STRIP: 5.5 PH (ref 5–7)
PHOSPHATE SERPL-MCNC: 3.9 MG/DL (ref 2.5–4.5)
PLATELET # BLD AUTO: 282 10E9/L (ref 150–450)
POTASSIUM SERPL-SCNC: 3.3 MMOL/L (ref 3.4–5.3)
PROT SERPL-MCNC: 8 G/DL (ref 6.8–8.8)
RBC # BLD AUTO: 4.98 10E12/L (ref 4.4–5.9)
SODIUM SERPL-SCNC: 138 MMOL/L (ref 133–144)
SOURCE: ABNORMAL
SP GR UR STRIP: 1.03 (ref 1–1.03)
UROBILINOGEN UR STRIP-MCNC: NORMAL MG/DL (ref 0–2)
WBC # BLD AUTO: 10.4 10E9/L (ref 4–11)

## 2018-10-05 PROCEDURE — 85652 RBC SED RATE AUTOMATED: CPT | Performed by: EMERGENCY MEDICINE

## 2018-10-05 PROCEDURE — 74019 RADEX ABDOMEN 2 VIEWS: CPT

## 2018-10-05 PROCEDURE — 36415 COLL VENOUS BLD VENIPUNCTURE: CPT | Performed by: INTERNAL MEDICINE

## 2018-10-05 PROCEDURE — 81003 URINALYSIS AUTO W/O SCOPE: CPT | Performed by: EMERGENCY MEDICINE

## 2018-10-05 PROCEDURE — 83605 ASSAY OF LACTIC ACID: CPT | Performed by: EMERGENCY MEDICINE

## 2018-10-05 PROCEDURE — 25000132 ZZH RX MED GY IP 250 OP 250 PS 637: Performed by: EMERGENCY MEDICINE

## 2018-10-05 PROCEDURE — 25000128 H RX IP 250 OP 636: Performed by: STUDENT IN AN ORGANIZED HEALTH CARE EDUCATION/TRAINING PROGRAM

## 2018-10-05 PROCEDURE — 96376 TX/PRO/DX INJ SAME DRUG ADON: CPT | Performed by: EMERGENCY MEDICINE

## 2018-10-05 PROCEDURE — 25000132 ZZH RX MED GY IP 250 OP 250 PS 637: Performed by: INTERNAL MEDICINE

## 2018-10-05 PROCEDURE — 83690 ASSAY OF LIPASE: CPT | Performed by: EMERGENCY MEDICINE

## 2018-10-05 PROCEDURE — 25000128 H RX IP 250 OP 636: Performed by: INTERNAL MEDICINE

## 2018-10-05 PROCEDURE — 84100 ASSAY OF PHOSPHORUS: CPT | Performed by: EMERGENCY MEDICINE

## 2018-10-05 PROCEDURE — 12000001 ZZH R&B MED SURG/OB UMMC

## 2018-10-05 PROCEDURE — 96361 HYDRATE IV INFUSION ADD-ON: CPT | Performed by: EMERGENCY MEDICINE

## 2018-10-05 PROCEDURE — 25000125 ZZHC RX 250: Performed by: EMERGENCY MEDICINE

## 2018-10-05 PROCEDURE — 25000128 H RX IP 250 OP 636: Performed by: NURSE PRACTITIONER

## 2018-10-05 PROCEDURE — 40000141 ZZH STATISTIC PERIPHERAL IV START W/O US GUIDANCE

## 2018-10-05 PROCEDURE — 85025 COMPLETE CBC W/AUTO DIFF WBC: CPT | Performed by: EMERGENCY MEDICINE

## 2018-10-05 PROCEDURE — 86140 C-REACTIVE PROTEIN: CPT | Performed by: NURSE PRACTITIONER

## 2018-10-05 PROCEDURE — 40000802 ZZH SITE CHECK

## 2018-10-05 PROCEDURE — 99285 EMERGENCY DEPT VISIT HI MDM: CPT | Mod: 25 | Performed by: EMERGENCY MEDICINE

## 2018-10-05 PROCEDURE — 25000125 ZZHC RX 250: Performed by: INTERNAL MEDICINE

## 2018-10-05 PROCEDURE — 25000131 ZZH RX MED GY IP 250 OP 636 PS 637: Performed by: INTERNAL MEDICINE

## 2018-10-05 PROCEDURE — 40000556 ZZH STATISTIC PERIPHERAL IV START W US GUIDANCE

## 2018-10-05 PROCEDURE — 96374 THER/PROPH/DIAG INJ IV PUSH: CPT | Mod: 59 | Performed by: EMERGENCY MEDICINE

## 2018-10-05 PROCEDURE — C9113 INJ PANTOPRAZOLE SODIUM, VIA: HCPCS | Performed by: NURSE PRACTITIONER

## 2018-10-05 PROCEDURE — 86140 C-REACTIVE PROTEIN: CPT | Performed by: EMERGENCY MEDICINE

## 2018-10-05 PROCEDURE — 99285 EMERGENCY DEPT VISIT HI MDM: CPT | Mod: Z6 | Performed by: EMERGENCY MEDICINE

## 2018-10-05 PROCEDURE — 74177 CT ABD & PELVIS W/CONTRAST: CPT

## 2018-10-05 PROCEDURE — 25000132 ZZH RX MED GY IP 250 OP 250 PS 637: Performed by: NURSE PRACTITIONER

## 2018-10-05 PROCEDURE — 25000128 H RX IP 250 OP 636: Performed by: EMERGENCY MEDICINE

## 2018-10-05 PROCEDURE — 00000146 ZZHCL STATISTIC GLUCOSE BY METER IP

## 2018-10-05 PROCEDURE — 80053 COMPREHEN METABOLIC PANEL: CPT | Performed by: EMERGENCY MEDICINE

## 2018-10-05 PROCEDURE — 96375 TX/PRO/DX INJ NEW DRUG ADDON: CPT | Performed by: EMERGENCY MEDICINE

## 2018-10-05 PROCEDURE — 99223 1ST HOSP IP/OBS HIGH 75: CPT | Mod: AI | Performed by: INTERNAL MEDICINE

## 2018-10-05 RX ORDER — POTASSIUM CHLORIDE 29.8 MG/ML
20 INJECTION INTRAVENOUS
Status: DISCONTINUED | OUTPATIENT
Start: 2018-10-05 | End: 2018-10-10 | Stop reason: HOSPADM

## 2018-10-05 RX ORDER — ONDANSETRON 4 MG/1
4 TABLET, ORALLY DISINTEGRATING ORAL 2 TIMES DAILY
Status: DISCONTINUED | OUTPATIENT
Start: 2018-10-05 | End: 2018-10-10 | Stop reason: HOSPADM

## 2018-10-05 RX ORDER — ONDANSETRON 4 MG/1
4 TABLET, ORALLY DISINTEGRATING ORAL EVERY 6 HOURS PRN
Status: DISCONTINUED | OUTPATIENT
Start: 2018-10-05 | End: 2018-10-05

## 2018-10-05 RX ORDER — ONDANSETRON 2 MG/ML
4 INJECTION INTRAMUSCULAR; INTRAVENOUS ONCE
Status: COMPLETED | OUTPATIENT
Start: 2018-10-05 | End: 2018-10-05

## 2018-10-05 RX ORDER — MORPHINE SULFATE 4 MG/ML
4 INJECTION, SOLUTION INTRAMUSCULAR; INTRAVENOUS ONCE
Status: COMPLETED | OUTPATIENT
Start: 2018-10-05 | End: 2018-10-05

## 2018-10-05 RX ORDER — HYOSCYAMINE SULFATE 0.125 MG
0.12 TABLET,DISINTEGRATING ORAL 3 TIMES DAILY
Status: DISCONTINUED | OUTPATIENT
Start: 2018-10-05 | End: 2018-10-10 | Stop reason: HOSPADM

## 2018-10-05 RX ORDER — DEXTROSE MONOHYDRATE 25 G/50ML
25-50 INJECTION, SOLUTION INTRAVENOUS
Status: DISCONTINUED | OUTPATIENT
Start: 2018-10-05 | End: 2018-10-05

## 2018-10-05 RX ORDER — PROCHLORPERAZINE MALEATE 5 MG
10 TABLET ORAL EVERY 6 HOURS PRN
Status: DISCONTINUED | OUTPATIENT
Start: 2018-10-05 | End: 2018-10-10 | Stop reason: HOSPADM

## 2018-10-05 RX ORDER — POTASSIUM CHLORIDE 7.45 MG/ML
10 INJECTION INTRAVENOUS
Status: DISCONTINUED | OUTPATIENT
Start: 2018-10-05 | End: 2018-10-10 | Stop reason: HOSPADM

## 2018-10-05 RX ORDER — NICOTINE POLACRILEX 4 MG
15-30 LOZENGE BUCCAL
Status: DISCONTINUED | OUTPATIENT
Start: 2018-10-05 | End: 2018-10-05

## 2018-10-05 RX ORDER — MAGNESIUM SULFATE HEPTAHYDRATE 40 MG/ML
4 INJECTION, SOLUTION INTRAVENOUS EVERY 4 HOURS PRN
Status: DISCONTINUED | OUTPATIENT
Start: 2018-10-05 | End: 2018-10-10 | Stop reason: HOSPADM

## 2018-10-05 RX ORDER — POTASSIUM CHLORIDE 1.5 G/1.58G
20-40 POWDER, FOR SOLUTION ORAL
Status: DISCONTINUED | OUTPATIENT
Start: 2018-10-05 | End: 2018-10-10 | Stop reason: HOSPADM

## 2018-10-05 RX ORDER — DEXTROSE MONOHYDRATE 25 G/50ML
25-50 INJECTION, SOLUTION INTRAVENOUS
Status: DISCONTINUED | OUTPATIENT
Start: 2018-10-05 | End: 2018-10-10 | Stop reason: HOSPADM

## 2018-10-05 RX ORDER — POTASSIUM CL/LIDO/0.9 % NACL 10MEQ/0.1L
10 INTRAVENOUS SOLUTION, PIGGYBACK (ML) INTRAVENOUS
Status: DISCONTINUED | OUTPATIENT
Start: 2018-10-05 | End: 2018-10-10 | Stop reason: HOSPADM

## 2018-10-05 RX ORDER — ONDANSETRON 2 MG/ML
4 INJECTION INTRAMUSCULAR; INTRAVENOUS EVERY 6 HOURS PRN
Status: DISCONTINUED | OUTPATIENT
Start: 2018-10-05 | End: 2018-10-05

## 2018-10-05 RX ORDER — GABAPENTIN 100 MG/1
100 CAPSULE ORAL 3 TIMES DAILY
Status: DISCONTINUED | OUTPATIENT
Start: 2018-10-05 | End: 2018-10-08

## 2018-10-05 RX ORDER — MUPIROCIN 20 MG/G
OINTMENT TOPICAL 3 TIMES DAILY
Status: DISCONTINUED | OUTPATIENT
Start: 2018-10-05 | End: 2018-10-10 | Stop reason: HOSPADM

## 2018-10-05 RX ORDER — DOXYCYCLINE 100 MG/1
100 CAPSULE ORAL 2 TIMES DAILY
Status: DISCONTINUED | OUTPATIENT
Start: 2018-10-05 | End: 2018-10-10 | Stop reason: HOSPADM

## 2018-10-05 RX ORDER — IOPAMIDOL 755 MG/ML
105 INJECTION, SOLUTION INTRAVASCULAR ONCE
Status: COMPLETED | OUTPATIENT
Start: 2018-10-05 | End: 2018-10-05

## 2018-10-05 RX ORDER — SODIUM CHLORIDE 9 MG/ML
INJECTION, SOLUTION INTRAVENOUS CONTINUOUS
Status: DISCONTINUED | OUTPATIENT
Start: 2018-10-05 | End: 2018-10-06

## 2018-10-05 RX ORDER — POLYETHYLENE GLYCOL 3350 17 G/17G
17 POWDER, FOR SOLUTION ORAL 3 TIMES DAILY
Status: DISCONTINUED | OUTPATIENT
Start: 2018-10-05 | End: 2018-10-07

## 2018-10-05 RX ORDER — POTASSIUM CHLORIDE 750 MG/1
20-40 TABLET, EXTENDED RELEASE ORAL
Status: DISCONTINUED | OUTPATIENT
Start: 2018-10-05 | End: 2018-10-10 | Stop reason: HOSPADM

## 2018-10-05 RX ORDER — LIDOCAINE 40 MG/G
CREAM TOPICAL
Status: DISCONTINUED | OUTPATIENT
Start: 2018-10-05 | End: 2018-10-10 | Stop reason: HOSPADM

## 2018-10-05 RX ORDER — OXYCODONE HYDROCHLORIDE 5 MG/1
5 TABLET ORAL ONCE
Status: COMPLETED | OUTPATIENT
Start: 2018-10-05 | End: 2018-10-05

## 2018-10-05 RX ORDER — POLYETHYLENE GLYCOL 3350 17 G/17G
17 POWDER, FOR SOLUTION ORAL 3 TIMES DAILY
Status: DISCONTINUED | OUTPATIENT
Start: 2018-10-05 | End: 2018-10-05

## 2018-10-05 RX ORDER — PROCHLORPERAZINE 25 MG
25 SUPPOSITORY, RECTAL RECTAL EVERY 12 HOURS PRN
Status: DISCONTINUED | OUTPATIENT
Start: 2018-10-05 | End: 2018-10-10 | Stop reason: HOSPADM

## 2018-10-05 RX ORDER — NICOTINE POLACRILEX 4 MG
15-30 LOZENGE BUCCAL
Status: DISCONTINUED | OUTPATIENT
Start: 2018-10-05 | End: 2018-10-10 | Stop reason: HOSPADM

## 2018-10-05 RX ADMIN — PANTOPRAZOLE SODIUM 40 MG: 40 INJECTION, POWDER, FOR SOLUTION INTRAVENOUS at 22:04

## 2018-10-05 RX ADMIN — SODIUM CHLORIDE: 9 INJECTION, SOLUTION INTRAVENOUS at 18:35

## 2018-10-05 RX ADMIN — SODIUM CHLORIDE 1000 ML: 9 INJECTION, SOLUTION INTRAVENOUS at 10:52

## 2018-10-05 RX ADMIN — MORPHINE SULFATE 4 MG: 4 INJECTION, SOLUTION INTRAMUSCULAR; INTRAVENOUS at 12:35

## 2018-10-05 RX ADMIN — POLYETHYLENE GLYCOL 3350 17 G: 17 POWDER, FOR SOLUTION ORAL at 18:36

## 2018-10-05 RX ADMIN — OXYCODONE HYDROCHLORIDE 5 MG: 5 TABLET ORAL at 18:36

## 2018-10-05 RX ADMIN — RANITIDINE 150 MG: 150 TABLET ORAL at 22:04

## 2018-10-05 RX ADMIN — SODIUM CHLORIDE 1000 ML: 9 INJECTION, SOLUTION INTRAVENOUS at 08:31

## 2018-10-05 RX ADMIN — MUPIROCIN: 20 OINTMENT TOPICAL at 22:04

## 2018-10-05 RX ADMIN — IOPAMIDOL 105 ML: 755 INJECTION, SOLUTION INTRAVENOUS at 10:45

## 2018-10-05 RX ADMIN — ONDANSETRON HYDROCHLORIDE 4 MG: 2 INJECTION INTRAMUSCULAR; INTRAVENOUS at 12:40

## 2018-10-05 RX ADMIN — PANTOPRAZOLE SODIUM 40 MG: 40 INJECTION, POWDER, FOR SOLUTION INTRAVENOUS at 15:17

## 2018-10-05 RX ADMIN — MORPHINE SULFATE 4 MG: 4 INJECTION INTRAVENOUS at 08:31

## 2018-10-05 RX ADMIN — HYOSCYAMINE SULFATE 0.12 MG: 0.12 TABLET, ORALLY DISINTEGRATING ORAL at 22:04

## 2018-10-05 RX ADMIN — LIDOCAINE HYDROCHLORIDE 30 ML: 20 SOLUTION ORAL; TOPICAL at 09:26

## 2018-10-05 RX ADMIN — POTASSIUM CHLORIDE 40 MEQ: 750 TABLET, EXTENDED RELEASE ORAL at 22:53

## 2018-10-05 RX ADMIN — DOXYCYCLINE HYCLATE 100 MG: 100 CAPSULE ORAL at 22:04

## 2018-10-05 RX ADMIN — ONDANSETRON 4 MG: 4 TABLET, ORALLY DISINTEGRATING ORAL at 22:04

## 2018-10-05 RX ADMIN — ONDANSETRON HYDROCHLORIDE 4 MG: 2 INJECTION INTRAMUSCULAR; INTRAVENOUS at 08:32

## 2018-10-05 ASSESSMENT — ENCOUNTER SYMPTOMS
CONSTIPATION: 0
ABDOMINAL PAIN: 1
VOMITING: 1
FREQUENCY: 0
HEADACHES: 0
WEAKNESS: 1
FEVER: 0
COUGH: 0
NAUSEA: 1
CHILLS: 0
SHORTNESS OF BREATH: 0
DIARRHEA: 0
DYSURIA: 0

## 2018-10-05 ASSESSMENT — ACTIVITIES OF DAILY LIVING (ADL): ADLS_ACUITY_SCORE: 10

## 2018-10-05 ASSESSMENT — PAIN DESCRIPTION - DESCRIPTORS: DESCRIPTORS: CRAMPING

## 2018-10-05 NOTE — IP AVS SNAPSHOT
MRN:[id number]                      After Visit Summary   10/5/2018    Andrew Lockwood    MRN: [id number]           Thank you!     Thank you for choosing [facility] for your care. Our goal is always to provide you with excellent care. Hearing back from our patients is one way we can continue to improve our services. Please take a few minutes to complete the written survey that you may receive in the mail after you visit with us. Thank you!        Patient Information     Date Of Birth          11/27/1967        About your hospital stay     You were admitted on:  October 5, 2018 You last received care in the:  [address] [facility]    You were discharged on:  October 10, 2018        Reason for your hospital stay       Gastroenteritis, partial SBO                  Who to Call     For medical emergencies, please call 911.  For non-urgent questions about your medical care, please call your primary care provider or clinic, None          Attending Provider     Provider Specialty    Juanita Portillo MD Emergency Medicine    Inderjit Quiroz MD Internal Medicine    Rudi, Marcio Sheehan MD Internal Medicine       Primary Care Provider Fax #    Physician No Ref-Primary [phone]       When to contact your care team       New fevers, severe abdominal pain, nausea/vomiting with inability to drink fluids, no bowel movement in 3 days, new redness/swelling/pus from right arm wound, concerns for high or low blood sugars.                  After Care Instructions     Activity       Your activity upon discharge: activity as tolerated            Diet       Follow this diet upon discharge:  Diabetic diet.  low gas diet (avoiding cruciferous vegetables, cabbage, beans, etc).  no carbonated beverages, no use of straws, no gum, no hard candy. No gulping.            Wound care and dressings       Instructions to care for your wound at home:   - guaze or band aid to right arm wound, apply mupirocin until the doxycycline  is finished                  Follow-up Appointments     Adult [facility] Follow-up and recommended labs and tests       1. Colorectal Surgery Clinic appointment on November 13th at 10AM with Dr. Lundberg.    3. Follow up in primary care clinic within 1 week for hospital follow up, check in on diabetes.    2. Follow up with ID Clinic per usual routine, sooner if right arm infection worsens or does not resolve.    Appointments on [place] and/or [facility] (with [facility] or [facility] provider or service). Call [phone] if you haven't heard regarding these appointments within 7 days of discharge.                  Your next 10 appointments already scheduled     Nov 13, 2018 10:00 AM CST   (Arrive by 9:45 AM)   New Patient Visit with Thanh Lundberg MD   [facility] Colon and Rectal Surgery ([facility])    [address] [place] [address] [id number] [phone]              Additional Services     Colorectal Surgery Referral                 Pending Results     No orders found from 10/3/2018 to 10/6/2018.            Statement of Approval     Ordered          10/10/18 1247  I have reviewed and agree with all the recommendations and orders detailed in this document.  EFFECTIVE NOW     Approved and electronically signed by:  Mario Garcia MD             Admission Information     Date & Time Provider Department Dept. Phone    10/5/2018 Marcio Grijalva MD [address] [facility] [place] [phone]      Your Vitals Were     Blood Pressure Pulse Temperature Respirations Weight Pulse Oximetry    118/77 (BP Location: Right arm) 79 97.5  F (36.4  C) (Oral) 18 78.4 kg (172 lb 14.4 oz) 100%    BMI (Body Mass Index)                   29.68 kg/m2           [facility] Information     Signature gives you secure access to your electronic health record. If you see a primary care provider, you can also send messages to your care team and make appointments. If you have questions, please call your  primary care clinic.  If you do not have a primary care provider, please call [phone] and they will assist you.        Care EveryWhere ID     This is your Care EveryWhere ID. This could be used by other organizations to access your [place] medical records  [id number]        Equal Access to Services     RA JORDAN : Apollo stephens Soterence, waaxda luqadaha, qaybta kaalmada axel, dez salzaar. So [facility] [phone].    ATENCIÓN: Si habla español, tiene a kohli disposición servicios gratuitos de asistencia lingüística. Jimboame al [phone].    We comply with applicable federal civil rights laws and [place] laws. We do not discriminate on the basis of race, color, national origin, age, disability, sex, sexual orientation, or gender identity.               Review of your medicines      START taking        Dose / Directions    calcium carbonate 500 MG chewable tablet   Commonly known as:  TUMS   Used for:  Gastroesophageal reflux disease with esophagitis        Dose:  2 chew tab   Take 2 tablets (1,000 mg) by mouth 3 times daily as needed for heartburn   Quantity:  150 tablet   Refills:  0       gabapentin 100 MG capsule   Commonly known as:  NEURONTIN   Used for:  Chronic abdominal pain        Dose:  200 mg   Take 2 capsules (200 mg) by mouth 3 times daily   Quantity:  90 capsule   Refills:  0       glycerin (adult) 2 g Supp Suppository   Used for:  Constipation, unspecified constipation type        Dose:  1 suppository   Place 1 suppository rectally daily as needed for constipation   Quantity:  5 suppository   Refills:  0       hyoscyamine 0.125 MG Tbdp   Used for:  Chronic abdominal pain        Dose:  0.25 mg   Take 2 tablets (0.25 mg) by mouth 4 times daily as needed for cramping   Quantity:  60 tablet   Refills:  0       magnesium oxide 400 MG tablet   Commonly known as:  MAG-OX   Used for:  Constipation, unspecified constipation type        Dose:  400 mg   Take 1  tablet (400 mg) by mouth daily   Quantity:  30 tablet   Refills:  0       mupirocin 2 % ointment   Commonly known as:  BACTROBAN   Used for:  Cellulitis of right arm        Apply topically 3 times daily for 3 days   Quantity:  22 g   Refills:  0       ondansetron 4 MG ODT tab   Commonly known as:  ZOFRAN-ODT   Used for:  Nausea        Dose:  4 mg   Take 1 tablet (4 mg) by mouth 2 times daily   Quantity:  6 tablet   Refills:  0       rifaximin 550 MG Tabs tablet   Commonly known as:  XIFAXAN   Indication:  SIBO   Used for:  Chronic abdominal pain        Dose:  550 mg   Take 1 tablet (550 mg) by mouth 3 times daily for 10 days   Quantity:  30 tablet   Refills:  0       simethicone 80 MG chewable tablet   Commonly known as:  MYLICON   Used for:  Chronic abdominal pain        Dose:  80 mg   Take 1 tablet (80 mg) by mouth 4 times daily   Quantity:  30 tablet   Refills:  0         CONTINUE these medicines which may have CHANGED, or have new prescriptions. If we are uncertain of the size of tablets/capsules you have at home, strength may be listed as something that might have changed.        Dose / Directions    * insulin glargine 100 UNIT/ML injection   Commonly known as:  LANTUS SOLOSTAR   This may have changed:  how much to take   Used for:  Type 2 diabetes mellitus with hyperglycemia, without long-term current use of insulin (H)        Dose:  20 Units   Inject 20 Units Subcutaneous every morning   Quantity:  15 mL   Refills:  0       * BASAGLAR 100 UNIT/ML injection   This may have changed:  You were already taking a medication with the same name, and this prescription was added. Make sure you understand how and when to take each.   Used for:  Type 2 diabetes mellitus with hyperglycemia, without long-term current use of insulin (H)        Dose:  20 Units   Inject 20 Units Subcutaneous daily   Quantity:  15 mL   Refills:  0       * Notice:  This list has 2 medication(s) that are the same as other medications prescribed  for you. Read the directions carefully, and ask your doctor or other care provider to review them with you.      CONTINUE these medicines which have NOT CHANGED        Dose / Directions    bictegravir-emtricitabine-tenofovir -25 MG per tablet   Commonly known as:  BIKTARVY   Used for:  HIV (human immunodeficiency virus infection) (H)        Dose:  1 tablet   Take 1 tablet by mouth daily   Quantity:  30 tablet   Refills:  11       blood glucose lancets standard   Commonly known as:  no brand specified   Used for:  Type 2 diabetes mellitus without complication, without long-term current use of insulin (H)        Use to test blood sugar four times daily or as directed.   Quantity:  100 Box   Refills:  5       blood glucose monitoring meter device kit   Commonly known as:  no brand specified   Used for:  Type 2 diabetes mellitus with complication, without long-term current use of insulin (H)        Use to test blood sugar 4 times daily or as directed.   Quantity:  1 kit   Refills:  0       blood glucose monitoring test strip   Commonly known as:  no brand specified   Used for:  Type 2 diabetes mellitus with complication, without long-term current use of insulin (H)        Dose:  1 strip   1 strip by In Vitro route 4 times daily (before meals and nightly) Use to test blood sugars 4 times daily or as directed   Quantity:  100 strip   Refills:  11       doxycycline 100 MG capsule   Commonly known as:  VIBRAMYCIN   Indication:  Infection of the Skin and/or Related Soft Tissue   Used for:  Cellulitis of right arm        Dose:  100 mg   Take 1 capsule (100 mg) by mouth 2 times daily for 7 doses   Quantity:  7 capsule   Refills:  0       empagliflozin 10 MG Tabs tablet   Commonly known as:  JARDIANCE   Used for:  Diabetes mellitus, type 2 (H)        Take 1 tablet by mouth daily   Quantity:  90 tablet   Refills:  3       insulin pen needle 31G X 8 MM   Commonly known as:  B-D U/F   Used for:  Type 2 diabetes mellitus  without complication, without long-term current use of insulin (H)        Use 1 pen needles daily or as directed.   Quantity:  100 each   Refills:  0       omeprazole 20 MG CR capsule   Commonly known as:  priLOSEC   Used for:  Heart burn        Dose:  20 mg   Take 1 capsule (20 mg) by mouth 2 times daily   Quantity:  180 capsule   Refills:  3       ranitidine 150 MG tablet   Commonly known as:  ZANTAC   Used for:  Heart burn        Dose:  150 mg   Take 1 tablet (150 mg) by mouth 2 times daily   Quantity:  60 tablet   Refills:  3            Where to get your medicines      These medications were sent to [facility] [place], [place] - [address] [address]     Phone:  [phone]     BASAGLAR 100 UNIT/ML injection    calcium carbonate 500 MG chewable tablet    doxycycline 100 MG capsule    gabapentin 100 MG capsule    glycerin (adult) 2 g Supp Suppository    hyoscyamine 0.125 MG Tbdp    insulin glargine 100 UNIT/ML injection    magnesium oxide 400 MG tablet    mupirocin 2 % ointment    ondansetron 4 MG ODT tab    rifaximin 550 MG Tabs tablet    simethicone 80 MG chewable tablet                Protect others around you: Learn how to safely use, store and throw away your medicines at www.disposemymeds.org.        ANTIBIOTIC INSTRUCTION     You've Been Prescribed an Antibiotic - Now What?  Your healthcare team thinks that you or your loved one might have an infection. Some infections can be treated with antibiotics, which are powerful, life-saving drugs. Like all medications, antibiotics have side effects and should only be used when necessary. There are some important things you should know about your antibiotic treatment.      Your healthcare team may run tests before you start taking an antibiotic.    Your team may take samples (e.g., from your blood, urine or other areas) to run tests to look for bacteria. These test can be important to determine if you  need an antibiotic at all and, if you do, which antibiotic will work best.      Within a few days, your healthcare team might change or even stop your antibiotic.    Your team may start you on an antibiotic while they are working to find out what is making you sick.    Your team might change your antibiotic because test results show that a different antibiotic would be better to treat your infection.    In some cases, once your team has more information, they learn that you do not need an antibiotic at all. They may find out that you don't have an infection, or that the antibiotic you're taking won't work against your infection. For example, an infection caused by a virus can't be treated with antibiotics. Staying on an antibiotic when you don't need it is more likely to be harmful than helpful.      You may experience side effects from your antibiotic.    Like all medications, antibiotics have side effects. Some of these can be serious.    Let you healthcare team know if you have any known allergies when you are admitted to the hospital.    One significant side effect of nearly all antibiotics is the risk of severe and sometimes deadly diarrhea caused by Clostridium difficile (C. Difficile). This occurs when a person takes antibiotics because some good germs are destroyed. Antibiotic use allows C. diificile to take over, putting patients at high risk for this serious infection.    As a patient or caregiver, it is important to understand your or your loved one's antibiotic treatment. It is especially important for caregivers to speak up when patients can't speak for themselves. Here are some important questions to ask your healthcare team.    What infection is this antibiotic treating and how do you know I have that infection?    What side effects might occur from this antibiotic?    How long will I need to take this antibiotic?    Is it safe to take this antibiotic with other medications or supplements (e.g.,  vitamins) that I am taking?     Are there any special directions I need to know about taking this antibiotic? For example, should I take it with food?    How will I be monitored to know whether my infection is responding to the antibiotic?    What tests may help to make sure the right antibiotic is prescribed for me?      Information provided by:  www.cdc.gov/getsmart  U.S. Department of Health and Human Services  Centers for disease Control and Prevention  National Center for Emerging and Zoonotic Infectious Diseases  Division of Healthcare Quality Promotion             Medication List: This is a list of all your medications and when to take them. Check marks below indicate your daily home schedule. Keep this list as a reference.      Medications           Morning Afternoon Evening Bedtime As Needed    bictegravir-emtricitabine-tenofovir -25 MG per tablet   Commonly known as:  BIKTARVY   Take 1 tablet by mouth daily   Last time this was given:  1 tablet on 10/10/2018  8:02 AM                                blood glucose lancets standard   Commonly known as:  no brand specified   Use to test blood sugar four times daily or as directed.                                blood glucose monitoring meter device kit   Commonly known as:  no brand specified   Use to test blood sugar 4 times daily or as directed.                                blood glucose monitoring test strip   Commonly known as:  no brand specified   1 strip by In Vitro route 4 times daily (before meals and nightly) Use to test blood sugars 4 times daily or as directed                                calcium carbonate 500 MG chewable tablet   Commonly known as:  TUMS   Take 2 tablets (1,000 mg) by mouth 3 times daily as needed for heartburn   Last time this was given:  500 mg on 10/10/2018  1:47 AM                                doxycycline 100 MG capsule   Commonly known as:  VIBRAMYCIN   Take 1 capsule (100 mg) by mouth 2 times daily for 7 doses    Last time this was given:  100 mg on 10/10/2018  8:03 AM                                empagliflozin 10 MG Tabs tablet   Commonly known as:  JARDIANCE   Take 1 tablet by mouth daily                                gabapentin 100 MG capsule   Commonly known as:  NEURONTIN   Take 2 capsules (200 mg) by mouth 3 times daily   Last time this was given:  200 mg on 10/10/2018  8:02 AM                                glycerin (adult) 2 g Supp Suppository   Place 1 suppository rectally daily as needed for constipation                                hyoscyamine 0.125 MG Tbdp   Take 2 tablets (0.25 mg) by mouth 4 times daily as needed for cramping   Last time this was given:  0.125 mg on 10/10/2018  9:46 AM                                * insulin glargine 100 UNIT/ML injection   Commonly known as:  LANTUS SOLOSTAR   Inject 20 Units Subcutaneous every morning   Last time this was given:  20 Units on 10/10/2018  8:00 AM                                * BASAGLAR 100 UNIT/ML injection   Inject 20 Units Subcutaneous daily   Last time this was given:  20 Units on 10/10/2018  8:00 AM                                insulin pen needle 31G X 8 MM   Commonly known as:  B-D U/F   Use 1 pen needles daily or as directed.                                magnesium oxide 400 MG tablet   Commonly known as:  MAG-OX   Take 1 tablet (400 mg) by mouth daily   Last time this was given:  400 mg on 10/9/2018  1:10 PM                                mupirocin 2 % ointment   Commonly known as:  BACTROBAN   Apply topically 3 times daily for 3 days   Last time this was given:  10/10/2018  8:02 AM                                omeprazole 20 MG CR capsule   Commonly known as:  priLOSEC   Take 1 capsule (20 mg) by mouth 2 times daily                                ondansetron 4 MG ODT tab   Commonly known as:  ZOFRAN-ODT   Take 1 tablet (4 mg) by mouth 2 times daily   Last time this was given:  4 mg on 10/10/2018  8:03 AM                                 ranitidine 150 MG tablet   Commonly known as:  ZANTAC   Take 1 tablet (150 mg) by mouth 2 times daily   Last time this was given:  150 mg on 10/10/2018  8:03 AM                                rifaximin 550 MG Tabs tablet   Commonly known as:  XIFAXAN   Take 1 tablet (550 mg) by mouth 3 times daily for 10 days   Last time this was given:  550 mg on 10/10/2018  8:03 AM                                simethicone 80 MG chewable tablet   Commonly known as:  MYLICON   Take 1 tablet (80 mg) by mouth 4 times daily   Last time this was given:  80 mg on 10/10/2018  8:03 AM                                * Notice:  This list has 2 medication(s) that are the same as other medications prescribed for you. Read the directions carefully, and ask your doctor or other care provider to review them with you.

## 2018-10-05 NOTE — ED TRIAGE NOTES
Andrew presents with c/o abdominal pain, nausea and vomiting that started last night. He reports up to five episodes of non-bloody emesis. History of HIV, diabetes and chronic abdominal pain. Denies changes in urination, reports normal BMs.

## 2018-10-05 NOTE — PHARMACY-ADMISSION MEDICATION HISTORY
Admission medication history interview status for the 10/5/2018 admission is complete. See Epic admission navigator for allergy information, pharmacy, prior to admission medications and immunization status.     Medication history interview sources:  patient    Changes made to PTA medication list (reason)  Added: none  Deleted:   -bismuth subsalicylate (patient no longer taking)  -methylprednisolone (patient finished therapy a week ago - reported that it was because he was experiencing foot sensitivity)  -ondansetron (patient reported that he never picked this medication up)  -oxycodone (patient finished the prescription)  -tizanidine (patient is finished taking this)  -triamcinolone cream (patient lost this cream)  Changed: none    Patient is a moderate historian. He brought his medication organizer with that has a medication list on the back of the container (except for the Lantus). He reported that he took all of his medications yesterday. He did report that he was supposed to start an antibiotic, but he hasn't picked it up from the pharmacy yet.      Prior to Admission medications    Medication Sig Last Dose Taking? Auth Provider   Bictegravir-Emtricitab-Tenofov -25 MG TABS Take 1 tablet by mouth daily 10/4/2018 at Unknown time Yes Sharon Rosado MD   empagliflozin (JARDIANCE) 10 MG TABS tablet Take 1 tablet by mouth daily 10/4/2018 at Unknown time Yes Lenora Haley PA-C   insulin glargine (LANTUS SOLOSTAR) 100 UNIT/ML pen Inject 50 Units Subcutaneous every morning  10/4/2018 at Unknown time Yes Ashley Connelly PA-C   omeprazole (PRILOSEC) 20 MG CR capsule Take 1 capsule (20 mg) by mouth 2 times daily 10/4/2018 at Unknown time Yes Caitie Lamb MD   ranitidine (ZANTAC) 150 MG tablet Take 1 tablet (150 mg) by mouth 2 times daily 10/4/2018 at Unknown time Yes Caitie Lamb MD   blood glucose (NO BRAND SPECIFIED) lancets standard Use to test blood sugar four  times daily or as directed. Unknown at Unknown time  Julissa Silver MD   blood glucose monitoring (NO BRAND SPECIFIED) meter device kit Use to test blood sugar 4 times daily or as directed. Unknown at Unknown time  Osbaldo Delacruz MD   blood glucose monitoring (NO BRAND SPECIFIED) test strip 1 strip by In Vitro route 4 times daily (before meals and nightly) Use to test blood sugars 4 times daily or as directed Unknown at Unknown time  Osbaldo Delacruz MD   doxycycline (VIBRAMYCIN) 100 MG capsule Take 1 capsule (100 mg) by mouth 2 times daily  Patient not taking: Reported on 10/5/2018 Not Taking at Unknown time  Davina Lang MD   insulin pen needle (B-D U/F) 31G X 8 MM Use 1 pen needles daily or as directed. Unknown at Unknown time  Russ Ponce MD         Medication history completed by: Geovanna Valles, PharmD 4 Student

## 2018-10-05 NOTE — LETTER
Transition Communication Hand-off for Care Transitions to Next Level of Care Provider    Name: Andrew Lockwood  : 1967  MRN #: [id number]  Primary Care Provider: Physician No Ref-Primary     Primary Clinic: No address on file     Reason for Hospitalization:    Adynamic ileus (H) [K56.0]  Nausea and vomiting, intractability of vomiting not specified, unspecified vomiting type [R11.2]    Admit Date/Time: 10/5/2018  7:51 AM  Discharge Date: 10/10/2018    Payor Source: Payor: BLUE PLUS / Plan: BLUE PLUS / Product Type: HMO /     Follow-up plan:  Future Appointments  Date Time Provider Department Center   2018 10:00 AM Thanh Lundberg MD [facility]       Any outstanding tests or procedures:        Referrals     Future Labs/Procedures    Colorectal Surgery Referral     Scheduling Instructions:    GI discussed with colorectal at multidisciplinary conference. Should have appointment with colorectal soon to discuss next steps in joint procedure with GI to further evaluate ongoing intestinal issues. He is in the hospital 10/10. If scheduling issues, use the demographics tab and use the patient's email address to contact him. He has no phone. It is ok to message him thi way.            Key Recommendations:  Please review AVS    Suly Easton RN, BSN, PHN  Medicine Care Coordinator  [facility], [facility], [facility] & 9 and [facility]  Desk Phone: [phone]  Pager: [phone]      AVS/Discharge Summary is the source of truth; this is a helpful guide for improved communication of patient story

## 2018-10-05 NOTE — ED PROVIDER NOTES
History     Chief Complaint   Patient presents with     Nausea & Vomiting     HPI  nAdrew Lockwood is a 50 year old male hx of HIV/AIDS, DM2 on insulin, CNS toxoplasmosis, chronic abdominal pain s/p appendectomy, hx of SBO s/p adhesion takedown on 04/2018 and recent admission for partial SBO on 09/10/2018 managed conservatively. Presenting today with recurring acute onset at midnight of non-bloody bilious emesis, abdominal cramping, bloating, and nausea. Pain reported around epigastrium and periumbilical. Last meal asparagus and pasta at 18:00. Reports 5 episodes of emesis since onset. Last BM last night around 18:00. Denies fever.     Reports that symptoms usually brought on by eating raw vegetables like salads. Has GI follow-up scheduled in 3 months.    I have reviewed the Medications, Allergies, Past Medical and Surgical History, and Social History in the Epic system.    Review of Systems   Constitutional: Negative for chills and fever.   Eyes: Negative for visual disturbance.   Respiratory: Negative for cough and shortness of breath.    Cardiovascular: Negative for chest pain.   Gastrointestinal: Positive for abdominal pain, nausea and vomiting. Negative for constipation and diarrhea.   Genitourinary: Negative for dysuria and frequency.   Neurological: Positive for weakness. Negative for headaches.   All other systems reviewed and are negative.      Physical Exam   BP: (!) 172/101  Heart Rate: 98  Temp: 98  F (36.7  C)  Resp: 20  Weight: 78.5 kg (173 lb)  SpO2: 100 %      Physical Exam   Constitutional: He is oriented to person, place, and time. He appears well-developed and well-nourished. No distress.   HENT:   Head: Normocephalic and atraumatic.   Eyes: Conjunctivae and EOM are normal.   Neck: Normal range of motion.   Cardiovascular: Normal rate, regular rhythm and normal heart sounds.    Pulmonary/Chest: Effort normal and breath sounds normal. No respiratory distress. He has no wheezes. He has no rales. He  exhibits no tenderness.   Abdominal: Soft. Bowel sounds are normal. There is tenderness (epigastric; periumbilical; LLQ). There is no rebound.   Musculoskeletal: Normal range of motion.   Neurological: He is alert and oriented to person, place, and time.   Skin: Skin is warm. He is not diaphoretic.   Psychiatric: He has a normal mood and affect.       ED Course     ED Course     Procedures        Results for orders placed or performed during the hospital encounter of 10/05/18 (from the past 24 hour(s))   CBC with platelets differential   Result Value Ref Range    WBC 10.4 4.0 - 11.0 10e9/L    RBC Count 4.98 4.4 - 5.9 10e12/L    Hemoglobin 15.7 13.3 - 17.7 g/dL    Hematocrit 44.7 40.0 - 53.0 %    MCV 90 78 - 100 fl    MCH 31.5 26.5 - 33.0 pg    MCHC 35.1 31.5 - 36.5 g/dL    RDW 13.8 10.0 - 15.0 %    Platelet Count 282 150 - 450 10e9/L    Diff Method Automated Method     % Neutrophils 67.5 %    % Lymphocytes 22.8 %    % Monocytes 6.1 %    % Eosinophils 2.4 %    % Basophils 0.3 %    % Immature Granulocytes 0.9 %    Nucleated RBCs 0 0 /100    Absolute Neutrophil 7.0 1.6 - 8.3 10e9/L    Absolute Lymphocytes 2.4 0.8 - 5.3 10e9/L    Absolute Monocytes 0.6 0.0 - 1.3 10e9/L    Absolute Eosinophils 0.3 0.0 - 0.7 10e9/L    Absolute Basophils 0.0 0.0 - 0.2 10e9/L    Abs Immature Granulocytes 0.1 0 - 0.4 10e9/L    Absolute Nucleated RBC 0.0    Comprehensive metabolic panel   Result Value Ref Range    Sodium 138 133 - 144 mmol/L    Potassium 3.3 (L) 3.4 - 5.3 mmol/L    Chloride 104 94 - 109 mmol/L    Carbon Dioxide 24 20 - 32 mmol/L    Anion Gap 10 3 - 14 mmol/L    Glucose 225 (H) 70 - 99 mg/dL    Urea Nitrogen 13 7 - 30 mg/dL    Creatinine 0.81 0.66 - 1.25 mg/dL    GFR Estimate >90 >60 mL/min/1.7m2    GFR Estimate If Black >90 >60 mL/min/1.7m2    Calcium 9.6 8.5 - 10.1 mg/dL    Bilirubin Total 0.7 0.2 - 1.3 mg/dL    Albumin 3.8 3.4 - 5.0 g/dL    Protein Total 8.0 6.8 - 8.8 g/dL    Alkaline Phosphatase 144 40 - 150 U/L    ALT 25  0 - 70 U/L    AST 15 0 - 45 U/L   Lipase   Result Value Ref Range    Lipase 219 73 - 393 U/L   Lactic acid whole blood   Result Value Ref Range    Lactic Acid 1.1 0.7 - 2.0 mmol/L   UA reflex to Microscopic and Culture   Result Value Ref Range    Color Urine Light Yellow     Appearance Urine Clear     Glucose Urine >1000 (A) NEG^Negative mg/dL    Bilirubin Urine Negative NEG^Negative    Ketones Urine 5 (A) NEG^Negative mg/dL    Specific Gravity Urine 1.032 1.003 - 1.035    Blood Urine Negative NEG^Negative    pH Urine 5.5 5.0 - 7.0 pH    Protein Albumin Urine Negative NEG^Negative mg/dL    Urobilinogen mg/dL Normal 0.0 - 2.0 mg/dL    Nitrite Urine Negative NEG^Negative    Leukocyte Esterase Urine Negative NEG^Negative    Source Clean catch urine    XR Abdomen 2 Views    Narrative    Exam: XR ABDOMEN 2 VW 10/5/2018 9:00 AM    History: HIV/AIDS, history of small bowel obstruction    Comparison: CT 9/27/2018    Findings: Upright and supine views of the abdomen were evaluated.  There is a relative paucity of small bowel gas. No portal venous gas,  pneumatosis, or free intraperitoneal gas. Lung bases are relatively  clear. No acute bony abnormality.      Impression    Impression: Not certain bowel gas pattern. Mild colonic stool burden.    ALEJANDRINA MARS MD   CT Abdomen Pelvis w Contrast    Narrative    EXAMINATION: CT ABDOMEN PELVIS W CONTRAST, 10/5/2018 10:56 AM    TECHNIQUE:  Helical CT images from the lung bases through the  symphysis pubis were obtained  with IV contrast. Contrast dose:  iopamidol (ISOVUE-370) solution 105 mL    COMPARISON: 9/27/2018    HISTORY: abdl pain, n/v HIV;     FINDINGS:  Mild dependent atelectasis in lung bases.    Stomach remains distended with debris, similar to previous. There is  continued hyperemia and wall thickening on the stomach most pronounced  in the antrum but also involving the gastric body to greater extent  than on previous exam.    In the mid abdomen small bowel, best seen  on series 3 image 23, there  is focal bowel dilation measuring up to 4.5 cm which is just proximal  to an area of narrowing, wall thickening, and hyperemia measuring  approximately 5 cm. Fecalization of the dilated segment. The  inflammatory changes involving the thickened segment of bowel extend  into the associated mesentery. This inflammation has progressed from  previous and the bowel dilation is new from previous.    Colon is unremarkable. No adenopathy in the abdomen or pelvis.  Scattered atherosclerotic vascular calcifications. Small hypodensity  in the right hepatic lobe on series 5 image 121 has benign appearance  and is stable from comparison exam, small to fully characterize. The  pancreas, adrenal glands, spleen, gallbladder, and kidneys are  unremarkable. Penile prosthesis is again seen with reservoir in the  left pelvis. Bladder is unremarkable.    No aggressive bone lesion.      Impression    IMPRESSION:   1. Focal mid small bowel enteritis associated wall thickening,  narrowing, inflammation. There is new associated dilation of  visualized bowel proximal to this area of inflammation compatible with  a functional partial obstruction. Infectious or inflammatory  etiologies should be considered.  2. Continued hyperemic appearance of the stomach with questionable  wall thickening, potentially representing gastritis.    ALFREDA SHIPLEY MD        Labs Ordered and Resulted from Time of ED Arrival Up to the Time of Departure from the ED   COMPREHENSIVE METABOLIC PANEL - Abnormal; Notable for the following:        Result Value    Potassium 3.3 (*)     Glucose 225 (*)     All other components within normal limits   UA MACROSCOPIC WITH REFLEX TO MICRO AND CULTURE - Abnormal; Notable for the following:     Glucose Urine >1000 (*)     Ketones Urine 5 (*)     All other components within normal limits   CBC WITH PLATELETS DIFFERENTIAL   LIPASE   LACTIC ACID WHOLE BLOOD            Assessments & Plan (with Medical  Decision Making)   51 yo M hx of HIV/AIDS, DM2, CNS toxoplasmosis, chronic abdominal pain s/p appendectomy, hx of SBO x2. Presenting with acute onset nausea, vomiting, abdominal pain. Differentials include SBO, viral/foodborne gastroenteritis, volvulus, pancreatitis, gastritis, gastroparesis. VS significant for elevated BP at 172/101, HR 98. Afebrile. Exam revealed epigastric, periumbilical, LLQ tenderness, no masses, no rebound. Bowel sounds present.     Presented with similar symptoms on 09/27/2018 and CT abdomen at that time reveals no bowel obstruction, mild non-specific gastric wall thickening which may reflect gastritis. Persistent stomach distention may indicate gastroparesis. Ulcer cannot be excluded. Prostatomegaly.    AXR today reveals relative paucity of small bowel gas, no portal venous gas, pneumatosis, or free intraperitoneal gas. Not certain bowel gas pattern. Mild colonic stool burden.    Labs revealed no leukocytosis, CMP significant for glucose 225, UA positive for glucose >1000, ketones 5+. LA 1.1, lipase 219.    Patient treated with 2 L NS bolus, IV zofran, morphine, GI cocktail which provided some relief though pain continuing. Patient remains NPO and without further emesis.    Repeat CT today concerning for functional partial obstruction with new areas of bowel dilation proximal to areas of inflammation. He remains hemodynamically stable. He will be admitted to medicine service for continued management.    I have reviewed the nursing notes.    I have reviewed the findings, diagnosis, plan and need for follow up with the patient.    New Prescriptions    No medications on file       Final diagnoses:   Adynamic ileus (H)   Nausea and vomiting, intractability of vomiting not specified, unspecified vomiting type       10/5/2018   [facility], EMERGENCY DEPARTMENT  ATTENDING NOTE: The patient was seen with Dr. Lazo, resident. I was personally and directly involved in patient care including  obtaining the history, performing the physical exam, ordering and reviewing of laboratory tests, and decision-making process. Plan of care was discussed with the patient. Andrew Lockwood is a 50 year old male with a past medical history of HIV/AIDS who presents with acute onset of bilious vomiting 5 times.  He also has abdominal pain throughout the middle of his abdomen.  His last bowel movement was yesterday.  He denies fevers.  .  Physical exam:  BP (!) 172/101  Temp 98  F (36.7  C) (Oral)  Resp 20  Wt 78.5 kg (173 lb)  SpO2 100%  BMI 29.7 kg/m2   General: A and O x 3, appears uncomfortable, lying on his left side and moaning  mucous membranes are dry  CV: RRR w M, g, r  Lungs: CTA B  Abdomen: Tender in the area around the umbilicus and diffusely tender, no guarding, no rebound no peritoneal signs  Neuro: nonfocal  Emergency Department course:  The patient was seen and examined at 0801 am.  Laboratory studies, including a CBC and comprehensive metabolic panel as well as lipase, are essentially unremarkable apart from hypokalemia, with potassium of 3.3, and an elevated glucose of 225.  Lactate is within normal limits at 1.1.  UA has glucosuria and 5 ketones and is otherwise negative.  He was treated with several doses of morphine IV for pain, Zofran IV for nausea and IV fluids.  Abdominal pelvic CT shows IMPRESSION:   1. Focal mid small bowel enteritis associated wall thickening,  narrowing, inflammation. There is new associated dilation of  visualized bowel proximal to this area of inflammation compatible with  a functional partial obstruction. Infectious or inflammatory  etiologies should be considered.  2. Continued hyperemic appearance of the stomach with questionable  wall thickening, potentially representing gastritis.    The patient is a 50-year-old male here with a functional small bowel obstruction with small bowel enteritis and inflammation with vomiting..  He will be admitted to the medicine service  for further evaluation and treatment.  I spoke with Dr. Feliz of Medicine regarding admission.    This note was created in part by the use of Dragon voice recognition dictation system. Inadvertent grammatical errors and typographical errors may still exist.  MD Lindsey Chen Alda L, MD  10/05/18 [id number]

## 2018-10-05 NOTE — IP AVS SNAPSHOT
[address] [address]    Phone:  [phone]                                       After Visit Summary   10/5/2018    Andrew Lockwood    MRN: [id number]           After Visit Summary Signature Page     I have received my discharge instructions, and my questions have been answered. I have discussed any challenges I see with this plan with the nurse or doctor.    ..........................................................................................................................................  Patient/Patient Representative Signature      ..........................................................................................................................................  Patient Representative Print Name and Relationship to Patient    ..................................................               ................................................  Date                                   Time    ..........................................................................................................................................  Reviewed by Signature/Title    ...................................................              ..............................................  Date                                               Time          22EPIC Rev 08/18

## 2018-10-05 NOTE — ED NOTES
[facility], [place]   ED Nurse to Floor Handoff     Andrew Lockwood is a 50 year old male who speaks English and lives alone,  in a shelter  They arrived in the ED by unknown from home    ED Chief Complaint: Nausea & Vomiting    ED Dx;   Final diagnoses:   Adynamic ileus (H)   Nausea and vomiting, intractability of vomiting not specified, unspecified vomiting type         Needed?: No    Allergies:   Allergies   Allergen Reactions     Metformin      Abdominal pain     Milk [Lac Bovis] Hives     Tylenol [Acetaminophen] Itching     Dulaglutide Rash   .  Past Medical Hx:   Past Medical History:   Diagnosis Date     AIDS (H)      Allergic rhinitis due to other allergen     DNS     Chronic abdominal pain      CNS toxoplasmosis (H)      Diabetes type 2, controlled (H)      GERD (gastroesophageal reflux disease)      HIV (human immunodeficiency virus infection) (H)      Periungual wart      Sleep apnea     doesn't use CPAP      Baseline Mental status: WDL  Current Mental Status changes: at basesline    Infection present or suspected this encounter: no  Sepsis suspected: No  Isolation type: No active isolations     Activity level - Baseline/Home:  Independent  Activity Level - Current:   Stand with Assist    Bariatric equipment needed?: No    In the ED these meds were given:   Medications   0.9% sodium chloride BOLUS (0 mLs Intravenous Stopped 10/5/18 1052)   ondansetron (ZOFRAN) injection 4 mg (4 mg Intravenous Given 10/5/18 0832)   morphine (PF) injection 4 mg (4 mg Intravenous Given 10/5/18 0831)   lidocaine (viscous) (XYLOCAINE) 2 % 15 mL, alum & mag hydroxide-simethicone (MYLANTA ES/MAALOX  ES) 15 mL GI Cocktail (30 mLs Oral Given 10/5/18 0926)   0.9% sodium chloride BOLUS (1,000 mLs Intravenous New Bag 10/5/18 1052)   iopamidol (ISOVUE-370) solution 105 mL (105 mLs Intravenous Given 10/5/18 1045)   sodium chloride (PF) 0.9% PF flush 76 mL (76 mLs Intravenous Given 10/5/18 1045)    morphine (PF) injection 4 mg (4 mg Intravenous Given 10/5/18 1235)   ondansetron (ZOFRAN) injection 4 mg (4 mg Intravenous Given 10/5/18 1240)       Drips running?  No    Home pump  No    Current LDAs  Peripheral IV 10/05/18 Left Upper forearm (Active)   Site Assessment WDL 10/5/2018  9:36 AM   Line Status Infusing 10/5/2018  9:36 AM   Number of days:0       Wound 06/17/18 Lateral;Right Leg Other (comment) (Active)   Number of days:110       Incision/Surgical Site 01/31/18 Umbilicus (Active)   Number of days:247       Incision/Surgical Site 01/31/18 Left;Lower Abdomen (Active)   Number of days:247       Incision/Surgical Site 04/16/18 Mid Abdomen (Active)   Number of days:172       Incision/Surgical Site 04/16/18 Left Abdomen (Active)   Number of days:172       Incision/Surgical Site 04/16/18 Left Abdomen (Active)   Number of days:172       Labs results:   Labs Ordered and Resulted from Time of ED Arrival Up to the Time of Departure from the ED   COMPREHENSIVE METABOLIC PANEL - Abnormal; Notable for the following:        Result Value    Potassium 3.3 (*)     Glucose 225 (*)     All other components within normal limits   UA MACROSCOPIC WITH REFLEX TO MICRO AND CULTURE - Abnormal; Notable for the following:     Glucose Urine >1000 (*)     Ketones Urine 5 (*)     All other components within normal limits   CBC WITH PLATELETS DIFFERENTIAL   LIPASE   LACTIC ACID WHOLE BLOOD       Imaging Studies:   Recent Results (from the past 24 hour(s))   XR Abdomen 2 Views    Narrative    Exam: XR ABDOMEN 2 VW 10/5/2018 9:00 AM    History: HIV/AIDS, history of small bowel obstruction    Comparison: CT 9/27/2018    Findings: Upright and supine views of the abdomen were evaluated.  There is a relative paucity of small bowel gas. No portal venous gas,  pneumatosis, or free intraperitoneal gas. Lung bases are relatively  clear. No acute bony abnormality.      Impression    Impression: Not certain bowel gas pattern. Mild colonic stool  burden.    ALEJANDRINA MARS MD   CT Abdomen Pelvis w Contrast    Narrative    EXAMINATION: CT ABDOMEN PELVIS W CONTRAST, 10/5/2018 10:56 AM    TECHNIQUE:  Helical CT images from the lung bases through the  symphysis pubis were obtained  with IV contrast. Contrast dose:  iopamidol (ISOVUE-370) solution 105 mL    COMPARISON: 9/27/2018    HISTORY: abdl pain, n/v HIV;     FINDINGS:  Mild dependent atelectasis in lung bases.    Stomach remains distended with debris, similar to previous. There is  continued hyperemia and wall thickening on the stomach most pronounced  in the antrum but also involving the gastric body to greater extent  than on previous exam.    In the mid abdomen small bowel, best seen on series 3 image 23, there  is focal bowel dilation measuring up to 4.5 cm which is just proximal  to an area of narrowing, wall thickening, and hyperemia measuring  approximately 5 cm. Fecalization of the dilated segment. The  inflammatory changes involving the thickened segment of bowel extend  into the associated mesentery. This inflammation has progressed from  previous and the bowel dilation is new from previous.    Colon is unremarkable. No adenopathy in the abdomen or pelvis.  Scattered atherosclerotic vascular calcifications. Small hypodensity  in the right hepatic lobe on series 5 image 121 has benign appearance  and is stable from comparison exam, small to fully characterize. The  pancreas, adrenal glands, spleen, gallbladder, and kidneys are  unremarkable. Penile prosthesis is again seen with reservoir in the  left pelvis. Bladder is unremarkable.    No aggressive bone lesion.      Impression    IMPRESSION:   1. Focal mid small bowel enteritis associated wall thickening,  narrowing, inflammation. There is new associated dilation of  visualized bowel proximal to this area of inflammation compatible with  a functional partial obstruction. Infectious or inflammatory  etiologies should be considered.  2. Continued  hyperemic appearance of the stomach with questionable  wall thickening, potentially representing gastritis.    ALFREDA SHIPLEY MD       Recent vital signs:   /86  Temp 98  F (36.7  C) (Oral)  Resp 20  Wt 78.5 kg (173 lb)  SpO2 98%  BMI 29.7 kg/m2    Cardiac Rhythm: Other  Pt needs tele? No  Skin/wound Issues: None    Code Status: Full Code    Pain control: good    Nausea control: good    Abnormal labs/tests/findings requiring intervention: CT Results    Family present during ED course? No   Family Comments/Social Situation comments: Lives in a shelter.     Tasks needing completion: None    MOSHE SULLIVAN RN  ascom-- [phone] [facility]  [phone] [facility]

## 2018-10-05 NOTE — CONSULTS
GASTROENTEROLOGY CONSULTATION      Date of Admission: 10/5/2018       Date of Consult: 10/5/18          Chief Complaint:   We were asked by Mario Garcia MD to evaluate this patient with partial small bowel obstruction and focal small bowel enteritis seen on CT.           ASSESSMENT AND RECOMMENDATIONS:   Assessment:  Andrew Lockwood is a 50 year old male with a history of HIV/AIDS on Biktarvy, DM type II on insulin, CNS toxoplasmosis, acute appendicitis s/p appendectomy on 1/31/18, SBO s/p lysis of adhesions, chronic abdominal pain and vomiting.     #Acute on chronic abdominal pain  #[place] and vomiting   #Acute on chronic partial small bowel obstruction  #Hypokalemia   #Chronic constipation   Patient presented with midline abdominal pain, nausea and non-bloody emesis. On exam, abdomen is soft, active bowel sound in all quadrants but patient has tenderness in upper midline abdomen to palpation. CRP is 14, sed rate 23, WBC 10.4, and lactic acid 1.1. Potassium is 3.3 but the rest of CMP and CBC are within normal limits. Abd/pelvic CT from 10/5/18 showed focal small bowel enteritis with associated wall thickening and narrowing small bowel dilation proximal to the inflammation concerning for partial small bowel obstruction.    Patient had similar issue of SBO versus ileus since 1/3/16, which he had ex laparoscopy on 7/26/16 and one loop of small bowel that appeared to be erythematous was seen without evidence of IBD. Given patient's chronic constipation, having diabetes and restriction of having regular nutrition, he may be having delayed motility that causing him to have severe constipation. Infectious source of enteritis cannot be ruled out at this time and stool studies will be obtained. Plan is to manage patient's partial SBO conservatively at this time.       Recommendations  --Aggressive bowel regimen, Miralax TID and daily enema. If no result patient may need Golytely and gastrografin enema   --NG tube  placement to LIS suction if emesis not controlled with antiemetics   --40mg of IV Protonix twice daily for possible gastritis seen on CT   --Avoid narcotics as it can worsen obstruction, nausea and vomiting   --Scheduled antiemetics and prn   --Rule out infection (C. Diff, enteric pathogens, giardia, ova and parasite)  --Monitor CRP, CMP and CBC  --Optimize electrolytes (magnesium 2, potassium 4, phos 3.5, and calcium of 8)  --Avoid NSAIDs   --Consider Bentyl/Hyosycamine and gabapentin for pain  --Serial abdominal exam and if concerned for acute abdomen, please obtain abdominal x-ray  --Please consult with surgery     Gastroenterology follow up recommendations: TBD     Patient care plan discussed with Dr. Molina, GI staff physician. Thank you for involving us in this patient's care. Please do not hesitate to contact the GI service with any questions or concerns.     Giuseppe Dubon CNP  Department of Gastroenterology   -------------------------------------------------------------------------------------------------------------------   History is obtained from the patient and the medical record.          History of Present Illness:   Andrew Lockwood is a 50 year old male with a history of HIV/AIDS, DM type II on insulin, CNS toxoplasmosis, acute appendicitis s/p appendectomy on 1/31/18, SBO s/p lysis of adhesions, chronic abdominal pain and vomiting.      Around 11pm on 10/4/18 patient developed mild abdominal pain and around 3am on 10/5/18 pain suddenly became severe. Pain is in epigastric to the umbilical, in midline. The last flatus was around 6am to 7am, and last bowel movement was 6pm last night. He has been vomiting since 4am on 10/5/18. He denies fevers, chills, hematemesis, hematochezia, melena, oral sores, odynophagia and dysphagia. He was recently admitted from 9/10-9/13 for partial SBO, which was medically managed following surgical consultation. He was scheduled to see GI as an outpatient for  evaluation of recurrent SBO for possible balloon enteroscopy, though he was unable to make this appointment due to homelessness and difficulty with transportation. He has a sister with Crohn's Disease who lives in [place]. He denies travel out of the [place] in over a decade and he lives at [facility] (not sure if anyone is sick at the shelter). He has a hx of HIV/AIDS, followed in ID clinic, with <20 HIV RNA and an absolute CD4 count of 317. He currently is treated with Biktarvy and notes compliance. He was seen in ID clinic recently for a possible abscess of the right arm, which did not need drainage. He was prescribed doxycycline but was unable to obtain this medication. He reports that he was able to open this wound, with some purulent drainage.    Imaging completed for abdominal pain:  1/3/16 CT showed questionable bowel obstruction  7/21/16 CT showed low-grade small bowel obstruction in mid left abdomen  7/24/CT showed persistent dilated small bowel in mid abdomen.  Patient had exploratory lap yelitza for SBO versus ileus with negative exploration colonoscopy.  8/25/16 CT showed stable focal dilated loop of small bowel and severe gastric distention  11/6/17 CT showed circumferential thickening of the pylorus and single dilated loop of small bowel concerning for partial obstruction.  Patient had EGD and colonoscopy that did not show evidence of IBD.  1/31/18 abdominal CT showed acute appendicitis s/p appendectomy  2/4/18 CT showed abscess in the right lower quadrant s/p right percutaneous drain placement  2/10/18 abdominal CT showed right percutaneous drain with decreased size of abdominal abscess  4/16/18 abdominal CT showed small bowel obstruction with 2 possible transition point with potential close loops of of obstruction s/p exploratory laparoscopy with lysis of addition  4/20/18 abdominal CTs showed postsurgical changes and resolution of small bowel obstruction  7/4/18 abdominal CT showed small bowel  obstruction with transition point in the right lower quadrant  9/10/18 abdominal CT showed loops of mildly dilated small bowel in the central abdomen and gentle transition point in the right mid abdomen  9/27/18 there was no bowel obstruction seen on CT but there was a gastric wall thickening concerning for gastritis.        Past Medical History:   Reviewed and edited as appropriate  Past Medical History:   Diagnosis Date     AIDS (H)      Allergic rhinitis due to other allergen     DNS     Chronic abdominal pain      CNS toxoplasmosis (H)      Diabetes type 2, controlled (H)      GERD (gastroesophageal reflux disease)      HIV (human immunodeficiency virus infection) (H)      Periungual wart      Sleep apnea     doesn't use CPAP            Past Surgical History:   Reviewed and edited as appropriate   Past Surgical History:   Procedure Laterality Date     C NONSPECIFIC PROCEDURE      right forearm fracture     COLONOSCOPY Left 1/22/2016    Procedure: COMBINED COLONOSCOPY, SINGLE OR MULTIPLE BIOPSY/POLYPECTOMY BY BIOPSY;  Surgeon: Clark Saini MD;  Location: [facility]     HC EXPLORE UNDESC TESTIS,INGUIN/SCROTAL       LAPAROSCOPIC APPENDECTOMY N/A 1/31/2018    Procedure: LAPAROSCOPIC APPENDECTOMY;  LAPAROSCOPIC APPENDECTOMY;  Surgeon: Dawn Holt MD;  Location: [facility]     LAPAROSCOPY DIAGNOSTIC (GENERAL) N/A 7/26/2016    Procedure: LAPAROSCOPY DIAGNOSTIC (GENERAL);  Surgeon: Susannah Arriaga MD;  Location: [facility]     LAPAROSCOPY DIAGNOSTIC (GENERAL) N/A 4/16/2018    Procedure: LAPAROSCOPY DIAGNOSTIC (GENERAL);  Diagnostic laparoscopy and lysis of adhesions;  Surgeon: Prince Dowling MD;  Location: [facility]     OPTICAL TRACKING SYSTEM CRANIOTOMY, EXCISE TUMOR, COMBINED Left 4/10/2015    Procedure: COMBINED OPTICAL TRACKING SYSTEM CRANIOTOMY, EXCISE TUMOR;  Surgeon: Mirlande Colmenares MD;  Location: [facility]     REPAIR GAMEKEEPER'S THUMB Right 12/2/2016    Procedure: REPAIR LIGAMENT ULNAR  COLLATERAL THUMB (GAMEKEEPER'S);  Surgeon: Evin Zamorano MD;  Location: [facility]            Previous Endoscopy:     Results for orders placed or performed during the hospital encounter of 01/08/16   COLONOSCOPY   Result Value Ref Range    COLONOSCOPY       [facility], [address] [place] [address] [phone]     Endoscopy Department  _______________________________________________________________________________  Patient Name: Andrew Lockwood             Procedure Date: 1/22/2016 10:08 AM  MRN: [id number]                       Account Number: [id number]  YOB: 1967             Admit Type: Inpatient  Age: 48                                Gender: Male  Note Status: Finalized                Attending MD: Clark Saini,   Pause for the Cause: pause for cause completed   _______________________________________________________________________________     Procedure:           Colonoscopy  Indications:         Chronic diarrhea  Providers:           Harleen Galvan MD, MD,                        Anu Camp, SENG  Patient Profile:     47 yo Male with AIDS (CD4 24), CMV viremia, CNS                        toxoplasmosis, and ansal lesions with HSV+ stain with                         intermittent diarrhea and abdominal pain. Foundt to have                        partial SBO with thickening in the distal jejunum which                        has resolved on interval imaging. Colonoscopy performed                        for further evaluation.  Referring MD:        Lam Silva MD  Requesting Provider: Lam Silva MD  Medicines:           Fentanyl 100 micrograms IV, Midazolam 3 mg IV  Complications:       No immediate complications.  _______________________________________________________________________________  Procedure:           Pre-Anesthesia Assessment:                       - Prior to the procedure, a History  and Physical was                        performed, and patient medications and allergies were                        reviewed. The patient is competent. The risks and                        benefits of the procedure and the sedation options and                        risks were discussed with the patient. All quest ions                        were answered and informed consent was obtained. Patient                        identification and proposed procedure were verified by                        the physician in the pre-procedure area in the procedure                        room. Mental Status Examination: alert and oriented.                        Airway Examination: Mallampati Class II (the uvula but                        not tonsillar pillars visualized). Respiratory                        Examination: clear to auscultation. CV Examination:                        regular rate and rhythm. Prophylactic Antibiotics: The                        patient does not require prophylactic antibiotics. Prior                        Anticoagulants: The patient has taken no previous                        anticoagulant or antiplatelet agents. ASA Grade                        Assessment: III - A patient with severe systemic                        disease. After reviewing the risks and benefits, the                         patient was deemed in satisfactory condition to undergo                        the procedure. The anesthesia plan was to use moderate                        sedation / analgesia (conscious sedation). Immediately                        prior to administration of medications, the patient was                        re-assessed for adequacy to receive sedatives. The heart                        rate, respiratory rate, oxygen saturations, blood                        pressure, adequacy of pulmonary ventilation, and                        response to care were monitored throughout the                         procedure. The physical status of the patient was                        re-assessed after the procedure.                       After obtaining informed consent, the colonoscope was                        passed under direct vision. Throughout the procedure,                        the patient's blood pressure, pulse, and oxygen                        saturations were monito red continuously. The Colonoscope                        was introduced through the anus and advanced to 10 cm                        into the ileum. The colonoscopy was performed without                        difficulty. The patient tolerated the procedure well.                        The quality of the bowel preparation was evaluated using                        the BBPS ([place] Bowel Preparation Scale) with scores                        of: Right Colon = 1 (portion of mucosa seen, but other                        areas not well seen due to staining, residual stool                        and/or opaque liquid), Transverse Colon = 2 (minor                        amount of residual staining, small fragments of stool                        and/or opaque liquid, but mucosa seen well) and Left                        Colon = 2 (minor amount of residual staining, small                        fragments of stool and/or opaque liquid, but mucosa seen                        well). The total BBP S score equals 6.                                                                                   Findings:       The perianal and digital rectal examinations were normal.       The colon (entire examined portion) appeared normal. Biopsies were taken        with a cold forceps from the right colon and left colon for evaluation        of microscopic colitis.       The ileum, 10-15 cm from the ileocecal valve appeared normal.                                                                                   Impression:          - The entire examined colon is normal.  "Biopsied to                        evaluate for microscopic colitis.                       - The examined portion of the ileum was normal.                       - Based on this exam, no etiology of small bowel                        thickening was noted. We were unable to reach the area                        of concern. If further evaluation needed, would consider                        single balloon enteroscopy, versus ca psule endoscopy,                        versus repeat imaging to see if abnormality persists (as                        already improving on CT).  Recommendation:      - Return patient to [facility] for [facility] care.                       - Await pathology results.                                                                                     Electronically signed by: Clark Saini MD  _____________________  Clark Saini,   1/22/2016 11:44 AM    Electronically signed by: Clark Saini MD  _____________________  Clark Saini,   1/22/2016 11:44 AM  Number of Addenda: 0    Note Initiated On: 1/22/2016 10:08 AM              Social History:   Reviewed and edited as appropriate  Social History     Social History     Marital status: Single     Spouse name: N/A     Number of children: N/A     Years of education: N/A     Occupational History           5 years; temp agency     Social History Main Topics     Smoking status: Current Every Day Smoker     Packs/day: 0.50     Types: Cigarettes     Last attempt to quit: 10/28/2016     Smokeless tobacco: Former User      Comment: quit Aug 2018     Alcohol use No      Comment: Last etoh in 2007     Drug use: No      Comment: \"Not very often\"     Sexual activity: Not Currently     Partners: Female, Male      Comment: Last sexual activity 2008     Other Topics Concern     Not on file     Social History Narrative    Born in [facility].  Came to the [place] in 1993.  Last traveled to visit family in 2008.                 Family " History:   Reviewed and edited as appropriate  Family History   Problem Relation Age of Onset     Diabetes Brother      Diabetes Father      Alzheimer Disease Father      Unknown/Adopted Mother      Diabetes Paternal Grandfather      Cancer No family hx of      no skin cancer     Skin Cancer No family hx of      no famiy hx of skin cancer     Glaucoma No family hx of      Macular Degeneration No family hx of            Allergies:   Reviewed and edited as appropriate     Allergies   Allergen Reactions     Metformin      Abdominal pain     Milk [Lac Bovis] Hives     Tylenol [Acetaminophen] Itching     Dulaglutide Rash            Medications:     Current Facility-Administered Medications   Medication     pantoprazole (PROTONIX) 40 mg IV push injection     Current Outpatient Prescriptions   Medication Sig     Bictegravir-Emtricitab-Tenofov -25 MG TABS Take 1 tablet by mouth daily     empagliflozin (JARDIANCE) 10 MG TABS tablet Take 1 tablet by mouth daily     insulin glargine (LANTUS SOLOSTAR) 100 UNIT/ML pen Inject 50 Units Subcutaneous every morning      omeprazole (PRILOSEC) 20 MG CR capsule Take 1 capsule (20 mg) by mouth 2 times daily     ranitidine (ZANTAC) 150 MG tablet Take 1 tablet (150 mg) by mouth 2 times daily     blood glucose (NO BRAND SPECIFIED) lancets standard Use to test blood sugar four times daily or as directed.     blood glucose monitoring (NO BRAND SPECIFIED) meter device kit Use to test blood sugar 4 times daily or as directed.     blood glucose monitoring (NO BRAND SPECIFIED) test strip 1 strip by In Vitro route 4 times daily (before meals and nightly) Use to test blood sugars 4 times daily or as directed     doxycycline (VIBRAMYCIN) 100 MG capsule Take 1 capsule (100 mg) by mouth 2 times daily (Patient not taking: Reported on 10/5/2018)     insulin pen needle (B-D U/F) 31G X 8 MM Use 1 pen needles daily or as directed.             Review of Systems:   A complete review of systems was  performed and is negative except as noted in the HPI           Physical Exam:   /85  Pulse 84  Temp 98  F (36.7  C) (Oral)  Resp 20  Wt 78.5 kg (173 lb)  SpO2 93%  BMI 29.7 kg/m2  Wt:   Wt Readings from Last 2 Encounters:   10/05/18 78.5 kg (173 lb)   09/27/18 79.4 kg (175 lb)      Constitutional: cooperative, pleasant, not dyspneic/diaphoretic, no acute distress  Eyes: Sclera anicteric/injected  Ears/nose/mouth/throat: Normal oropharynx without ulcers or exudate, mucus membranes moist, hearing intact  Neck: supple, thyroid normal size  CV: RRR. No edema in LE bilaterally   Respiratory: Unlabored breathing. CTA bilaterally   Lymph: No axillary, submandibular, supraclavicular or inguinal lymphadenopathy  Abd: Nondistended, +bs, no hepatosplenomegaly, no peritoneal signs. Tenderness in midline from epigastric to umbilical   Skin: warm, perfused, no jaundice  Neuro: AAO x 3, No asterixis  Psych: Normal affect  MSK: Normal gait         Data:   Labs and imaging below were independently reviewed and interpreted    BMP  Recent Labs  Lab 10/05/18  0818      POTASSIUM 3.3*   CHLORIDE 104   LINDA 9.6   CO2 24   BUN 13   CR 0.81   *     CBC  Recent Labs  Lab 10/05/18  0818   WBC 10.4   RBC 4.98   HGB 15.7   HCT 44.7   MCV 90   MCH 31.5   MCHC 35.1   RDW 13.8        INRNo lab results found in last 7 days.  LFTs  Recent Labs  Lab 10/05/18  0818   ALKPHOS 144   AST 15   ALT 25   BILITOTAL 0.7   PROTTOTAL 8.0   ALBUMIN 3.8      PANC  Recent Labs  Lab 10/05/18  0818   LIPASE 219     Reviewed imaging:  CT ABDOMEN PELVIS W CONTRAST, 10/5/2018 10:56 AM     TECHNIQUE:  Helical CT images from the lung bases through the  symphysis pubis were obtained  with IV contrast. Contrast dose:  iopamidol (ISOVUE-370) solution 105 mL     COMPARISON: 9/27/2018     HISTORY: abdl pain, n/v HIV;      FINDINGS:  Mild dependent atelectasis in lung bases.     Stomach remains distended with debris, similar to previous. There  is  continued hyperemia and wall thickening on the stomach most pronounced  in the antrum but also involving the gastric body to greater extent  than on previous exam.     In the mid abdomen small bowel, best seen on series 3 image 23, there  is focal bowel dilation measuring up to 4.5 cm which is just proximal  to an area of narrowing, wall thickening, and hyperemia measuring  approximately 5 cm. Fecalization of the dilated segment. The  inflammatory changes involving the thickened segment of bowel extend  into the associated mesentery. This inflammation has progressed from  previous and the bowel dilation is new from previous.     Colon is unremarkable. No adenopathy in the abdomen or pelvis.  Scattered atherosclerotic vascular calcifications. Small hypodensity  in the right hepatic lobe on series 5 image 121 has benign appearance  and is stable from comparison exam, small to fully characterize. The  pancreas, adrenal glands, spleen, gallbladder, and kidneys are  unremarkable. Penile prosthesis is again seen with reservoir in the  left pelvis. Bladder is unremarkable.     No aggressive bone lesion.      IMPRESSION:   1. Focal mid small bowel enteritis associated wall thickening,  narrowing, inflammation. There is new associated dilation of  visualized bowel proximal to this area of inflammation compatible with  a functional partial obstruction. Infectious or inflammatory  etiologies should be considered.  2. Continued hyperemic appearance of the stomach with questionable  wall thickening, potentially representing gastritis.

## 2018-10-05 NOTE — H&P
[facility], [place]    Internal Medicine History and Physical - [facility]       Date of Admission:  10/5/2018    Chief Complaint   Abdominal pain, nausea/vomiting    History of Present Illness   Andrew Lockwood is a 50 year old male with a history of IDDM, chronic abdominal pain s/p appendectomy and recent SBO s/p adhesion takedown and HIV/AIDS c/b CNS toxoplasmosis who presents with abdominal pain and vomiting. He suddenly developed acute diffuse abdominal pain last evening, followed by multiple episodes of bilious non bloody emesis over the course of the last day. He has not been able to keep down fluids and felt lightheaded and dizzy. His last BM was yesterday evening. He denies fevers, chills, hematochezia, melena, oral sores. He was recently admitted from 9/10-9/13 for partial SBO, which was medically managed following surgical consultation. He was scheduled to see GI as an outpatient for evaluation of recurrent SBO, though he was unable to make this appointment due to homelessness and difficulty with transportation. He has a sister with Crohn's Disease. She lives in [place]. He denies travel out of the [place] in over a decade. He has a hx of HIV/AIDS, followed in ID clinic, with <20 HIV RNA and an absolute CD4 count of 317. He currently is treated with Biktarvy and notes compliance. He was seen in ID clinic recently for a possible abscess of the right arm, which did not need drainage. He was prescribed doxycycline but was unable to obtain this medication. He reports that he was able to open this wound, with some purulent drainage.    Review of Systems   The 10 point Review of Systems is negative other than noted in the HPI or here.    Past Medical History    I have reviewed this patient's medical history and updated it with pertinent information if needed.   Past Medical History:   Diagnosis Date     AIDS (H)      Allergic rhinitis due to other allergen     DNS     Chronic  abdominal pain      CNS toxoplasmosis (H)      Diabetes type 2, controlled (H)      GERD (gastroesophageal reflux disease)      HIV (human immunodeficiency virus infection) (H)      Periungual wart      Sleep apnea     doesn't use CPAP      Past Surgical History   I have reviewed this patient's surgical history and updated it with pertinent information if needed.  Past Surgical History:   Procedure Laterality Date     C NONSPECIFIC PROCEDURE      right forearm fracture     COLONOSCOPY Left 1/22/2016    Procedure: COMBINED COLONOSCOPY, SINGLE OR MULTIPLE BIOPSY/POLYPECTOMY BY BIOPSY;  Surgeon: Clark Saini MD;  Location: [facility]     HC EXPLORE UNDESC TESTIS,INGUIN/SCROTAL       LAPAROSCOPIC APPENDECTOMY N/A 1/31/2018    Procedure: LAPAROSCOPIC APPENDECTOMY;  LAPAROSCOPIC APPENDECTOMY;  Surgeon: Dawn Holt MD;  Location: [facility] OR     LAPAROSCOPY DIAGNOSTIC (GENERAL) N/A 7/26/2016    Procedure: LAPAROSCOPY DIAGNOSTIC (GENERAL);  Surgeon: Susannah Arriaga MD;  Location: [facility] OR     LAPAROSCOPY DIAGNOSTIC (GENERAL) N/A 4/16/2018    Procedure: LAPAROSCOPY DIAGNOSTIC (GENERAL);  Diagnostic laparoscopy and lysis of adhesions;  Surgeon: Prince Dowling MD;  Location: [facility] OR     OPTICAL TRACKING SYSTEM CRANIOTOMY, EXCISE TUMOR, COMBINED Left 4/10/2015    Procedure: COMBINED OPTICAL TRACKING SYSTEM CRANIOTOMY, EXCISE TUMOR;  Surgeon: Mirlande Colmenares MD;  Location: [facility] OR     REPAIR GAMEKEEPER'S THUMB Right 12/2/2016    Procedure: REPAIR LIGAMENT ULNAR COLLATERAL THUMB (GAMEKEEPER'S);  Surgeon: Evin Zamorano MD;  Location: [facility]      Social History   Social History   Substance Use Topics     Smoking status: Current Every Day Smoker     Packs/day: 0.50     Types: Cigarettes     Last attempt to quit: 10/28/2016     Smokeless tobacco: Former User      Comment: quit Aug 2018     Alcohol use No      Comment: Last etoh in 2007     Family History   I have reviewed this patient's family history  and updated it with pertinent information if needed.   Family History   Problem Relation Age of Onset     Diabetes Brother      Diabetes Father      Alzheimer Disease Father      Unknown/Adopted Mother      Diabetes Paternal Grandfather      Cancer No family hx of      no skin cancer     Skin Cancer No family hx of      no famiy hx of skin cancer     Glaucoma No family hx of      Macular Degeneration No family hx of      Prior to Admission Medications   Prior to Admission Medications   Prescriptions Last Dose Informant Patient Reported? Taking?   Bictegravir-Emtricitab-Tenofov -25 MG TABS 10/4/2018 at Unknown time  No Yes   Sig: Take 1 tablet by mouth daily   blood glucose (NO BRAND SPECIFIED) lancets standard Unknown at Unknown time  No No   Sig: Use to test blood sugar four times daily or as directed.   blood glucose monitoring (NO BRAND SPECIFIED) meter device kit Unknown at Unknown time  No No   Sig: Use to test blood sugar 4 times daily or as directed.   blood glucose monitoring (NO BRAND SPECIFIED) test strip Unknown at Unknown time  No No   Si strip by In Vitro route 4 times daily (before meals and nightly) Use to test blood sugars 4 times daily or as directed   doxycycline (VIBRAMYCIN) 100 MG capsule Not Taking at Unknown time  No No   Sig: Take 1 capsule (100 mg) by mouth 2 times daily   Patient not taking: Reported on 10/5/2018   empagliflozin (JARDIANCE) 10 MG TABS tablet 10/4/2018 at Unknown time  No Yes   Sig: Take 1 tablet by mouth daily   insulin glargine (LANTUS SOLOSTAR) 100 UNIT/ML pen 10/4/2018 at Unknown time  Yes Yes   Sig: Inject 50 Units Subcutaneous every morning    insulin pen needle (B-D U/F) 31G X 8 MM Unknown at Unknown time  No No   Sig: Use 1 pen needles daily or as directed.   omeprazole (PRILOSEC) 20 MG CR capsule 10/4/2018 at Unknown time  No Yes   Sig: Take 1 capsule (20 mg) by mouth 2 times daily   ranitidine (ZANTAC) 150 MG tablet 10/4/2018 at Unknown time  No Yes   Sig:  Take 1 tablet (150 mg) by mouth 2 times daily      Facility-Administered Medications: None     Allergies   Allergies   Allergen Reactions     Metformin      Abdominal pain     Milk [Lac Bovis] Hives     Tylenol [Acetaminophen] Itching     Dulaglutide Rash     Physical Exam   Vital Signs: Temp: 98  F (36.7  C) Temp src: Oral BP: 128/85 Pulse: 84 Heart Rate: 98 Resp: 20 SpO2: 93 % O2 Device: None (Room air)    Weight: 173 lbs 0 oz    General Appearance: sitting in bed, NAD  Eyes: anicteric  HEENT: no oral sores  Respiratory: CTAB, no w/r/r  Cardiovascular: RRR, no m/g/r  GI: distended, tender in all quadrants but RUQ, no rigidity, BS+  Lymph/Hematologic: no cervical adenopathy  Skin: 1 cm open wound on right forearm with surrounding erythema and induration with scant discharge. Previous laparoscopy scars on the abdomen.  Musculoskeletal: no focal deficits  Neurologic: alert, no focal deficits    Assessment & Plan   Andrew Lockwood is a 50 year old male with a history of IDDM, chronic abdominal pain s/p appendectomy and recent SBO s/p adhesion takedown and HIV/AIDS c/b CNS toxoplasmosis who presents with abdominal pain and vomiting.    #Acute on chronic abdominal pain  #Partial SBO  Patient has a complex history of recurrent abdominal pain over the last few years with multiple admissions, most recently with SBO which resolved with medical management. Prior to this in April 2018 he had an ex lap with lysis of adhesions. Differential for his recurrent SBO would be adhesions, infectious (CMV colitis), IBD, gastroparesis with hx of DM, less likely malignancy. Previous SB enteropathy biopsies in 2016 negative for HSV/CMV/fungal elements/H pylori/histologic abnormalities. Colonoscopy pathology from same period also unremarkable.  He did have endolimax nilson grow from an O&P stool sample in 2017; this is typically nonpathogenic, unclear of significance in context of a potentially immunosuppressed patient. Continues to have  evidence of at least partial SBO clinically and on imaging, etiology unclear. Otherwise stable.  - Surgery consult  - GI consult  - NPO  - consider NG decompression  - stool for c diff, enteric stool panel, stool lactoferin, giardia Ag, O&P x3  - CRP, ESR  - CMV PCR  - optimize lytes   - daily BMP, Mg, Phos  - MIVF s/p multiple boluses in ER  - consider ID consult if initial infectious studies negative    #SSI of the right forearm  Bug bite that turned into skin and soft tissue infection with induration and history of purulence. No evidence of abscess, as it is draining. No systemic symptoms. Started on doxy yesterday per ID in clinic, due to concern for bactrim resistance. Has not started yet. Wound growing staph.  - doxycycline suspension to lessen risk of esophagitis  - topical mupirocin TID x 5 days    #HIV  Followed in ID Clinic. Last CD4 317, HIV <20. On treatment.  - continue home bictegravir-emtricitabine-tenofovir daily    #insulin dependent DM  Last hgb A1c 8.7.  - glucose check q4 while NPO  - reduced long acting insulin while NPO  - adjust insulin once PO  - hypoglycemia protocol  - hold home empagliflozin    Diet:  NPO with ice chips, meds  Fluids: NS at 100 ml/hr  Lines: PIV  Potts Catheter: not present  DVT Prophylaxis: Low Risk/Ambulatory with no VTE prophylaxis indicated  Code Status: Prior  Disposition Plan   Expected discharge: 2 - 3 days, recommended to prior living arrangement once evaluated by specialists and has signs of resolving SBO, with tolerance of PO diet.     Entered: Mario Bone MD 10/05/2018, 2:50 PM   Information in the above section will display in the discharge planner report.       The patient was discussed with MD Cristina Del Rio [address] [place] [address]   Pager: [phone]  Please see sticky note for cross cover information    Data     Recent Labs  Lab 10/05/18  0818   WBC 10.4   HGB 15.7   MCV 90         POTASSIUM 3.3*    CHLORIDE 104   CO2 24   BUN 13   CR 0.81   ANIONGAP 10   LINDA 9.6   *   ALBUMIN 3.8   PROTTOTAL 8.0   BILITOTAL 0.7   ALKPHOS 144   ALT 25   AST 15   LIPASE 219       Recent Results (from the past 24 hour(s))   XR Abdomen 2 Views    Narrative    Exam: XR ABDOMEN 2 VW 10/5/2018 9:00 AM    History: HIV/AIDS, history of small bowel obstruction    Comparison: CT 9/27/2018    Findings: Upright and supine views of the abdomen were evaluated.  There is a relative paucity of small bowel gas. No portal venous gas,  pneumatosis, or free intraperitoneal gas. Lung bases are relatively  clear. No acute bony abnormality.      Impression    Impression: Not certain bowel gas pattern. Mild colonic stool burden.    ALEJANDRINA MARS MD   CT Abdomen Pelvis w Contrast    Narrative    EXAMINATION: CT ABDOMEN PELVIS W CONTRAST, 10/5/2018 10:56 AM    TECHNIQUE:  Helical CT images from the lung bases through the  symphysis pubis were obtained  with IV contrast. Contrast dose:  iopamidol (ISOVUE-370) solution 105 mL    COMPARISON: 9/27/2018    HISTORY: abdl pain, n/v HIV;     FINDINGS:  Mild dependent atelectasis in lung bases.    Stomach remains distended with debris, similar to previous. There is  continued hyperemia and wall thickening on the stomach most pronounced  in the antrum but also involving the gastric body to greater extent  than on previous exam.    In the mid abdomen small bowel, best seen on series 3 image 23, there  is focal bowel dilation measuring up to 4.5 cm which is just proximal  to an area of narrowing, wall thickening, and hyperemia measuring  approximately 5 cm. Fecalization of the dilated segment. The  inflammatory changes involving the thickened segment of bowel extend  into the associated mesentery. This inflammation has progressed from  previous and the bowel dilation is new from previous.    Colon is unremarkable. No adenopathy in the abdomen or pelvis.  Scattered atherosclerotic vascular calcifications.  Small hypodensity  in the right hepatic lobe on series 5 image 121 has benign appearance  and is stable from comparison exam, small to fully characterize. The  pancreas, adrenal glands, spleen, gallbladder, and kidneys are  unremarkable. Penile prosthesis is again seen with reservoir in the  left pelvis. Bladder is unremarkable.    No aggressive bone lesion.      Impression    IMPRESSION:   1. Focal mid small bowel enteritis associated wall thickening,  narrowing, inflammation. There is new associated dilation of  visualized bowel proximal to this area of inflammation compatible with  a functional partial obstruction. Infectious or inflammatory  etiologies should be considered.  2. Continued hyperemic appearance of the stomach with questionable  wall thickening, potentially representing gastritis.    ALFREDA SHIPLEY MD           ATTENDING ATTESTATION  Patient seen, examined and discussed with resident(s) or advanced practice provider. I agree with the key elements of findings and plan described above, with pertinent additions, amendments and/or updates as included in text below.     Mr. Lockwood has a history of HIV (on ART, CD4 317, VLUD in August) and abdominal surgery with subsequent SBP requiring lysis of adhesions. He presents with abdominal pain and vomiting of about 1 d duration. He has been passing flatus. On examination, he is awake and appears uncomfortable but non-toxic. Abdomen soft, with no peritoneal signs. Normal bowel sounds. There is diffuse, mild TTP. CT of abdomen shows focal thickening of the small bowl just proximal to an area of narrowing that appears more inflamed than on previous imaging studies. Stomach also thickened, c/w possible gastritis. A/P: Exam and history argue against recurrence of small bowel obstruction. Higher suspicion for infectious or auto-immune causes of enteritis. To that end, we have ordered CDiff, stook enteric panel, lactoferrin, O&P, CMV assays. TB enteritis also on  Ddx, as is viral gastroenteritis, which is more probable from an epidemiologic standpoint. We will monitor symptoms while above workup is pending. If they improve significantly, he may be able to return home with a presumptive diagnosis of viral GE.     Inderjit Quiroz MD  [facility]

## 2018-10-05 NOTE — CONSULTS
"General Surgery Consult    HPI: Andrew Lockwood is a 51 y/o male with a past medical history of HIV and multiple \"low grade\" bowel obstructions, most recently managed non operatively with an ng tube at [facility] from 9/10 to 9/13 that comes into the ED with diffuse abdominal pain, nausea, and vomiting that started at midnight. He tells us that he is still passing flatus. Last bowel movement was last night. Mr Lockwood tells me that he has had pain like this \"many times before\". Mr Lockwood was most recently admitted to the internal medicine service at [facility] for conservative management of a SBO with an NG tube from 9/10 to 9/13, during which he rapidly passed a gastrografin challenge and was discharged. He has also had multiple abdominal explorations for lysis of adhesions as detailed below. Denies cp, sob, dysuria, new weakness/numbness/tingling, fevers or chills    PMHx: DMT2, toxoplasmosis, retinal detachment, HIV on HAART    PShx: \"Everything\"  Exploratory laparoscopy with lysis of adhesions on 4/16/2018 with Dr Dowling  Appendectomy on 1/31/18  Exploratory laparoscopy on 7/26/16 for sbo vs ileus with negative exploration  Colonoscopy  Right thumb ligament tear repair  Craniotomy    Social Hx: Current smoker, denies EtOH    Family History: Unknown family hx per pt    Allergies: Metformin, milk, tylenol, dulaglutide    Physical Exam:  /87  Pulse 84  Temp 98  F (36.7  C) (Oral)  Resp 20  Wt 78.5 kg (173 lb)  SpO2 96%  BMI 29.7 kg/m2    Gen: Well appearing in nad  Eyes: No scleral icterus  CVS: RRR  Pulm: NLB on RA  Abd: Soft, tender in epigastric area, very minimal distension. No r/g  Ext: Wwp no edema  Neuro: CN II-XII intact  Psych: Cooperative    Labs remarkable for CRP 14, glucose 225, WBC 10.4, ESR 23, and glucose in UA    CT Abd/Pelvis shows focal small bowel enteritis with some wall thickening and narrowing and small bowel dilation proximal to inflammation. Gastritis.    Assessment: 50 year old male with a past " medical history of toxoplasmosis, HIV and multiple abdominal explorations for small bowel obstruction, at least one negative and at least one positive (most recently 6 months prior) now with abdominal pain, n/v, and focal, proximal enteritis/small bowel dilation. Given the patient's recent history of lysis of adhesions and the gastritis/enteritis evident on CT imaging with a history of HIV and toxoplasmosis, we feel that Mr Lockwood's obstruction is likely pseudoobstruction and not mechanical. The patient also endorses flatus and recently had a bowel movement making whatever obstruction he does have less clinically significant. We strongly favor an infectious or autoimmune etiology for his abdominal pain and other findings.     Plan:  - Consider a GI consult for possible autoimmune or infectious work up of small bowel enteritis and gastritis  - Consider an ID consult given patient's HIV status and hx of toxo  - No surgical intervention  - Recommend gracious admission to medical service given prior recent admission  - General surgery will sign off at this time, please call us with any questions    Discussed with chief, Dr Eason    --  Simon Cisneros  General Surgery PGY2

## 2018-10-06 LAB
ALBUMIN SERPL-MCNC: 2.8 G/DL (ref 3.4–5)
ALP SERPL-CCNC: 100 U/L (ref 40–150)
ALT SERPL W P-5'-P-CCNC: 19 U/L (ref 0–70)
ANION GAP SERPL CALCULATED.3IONS-SCNC: 6 MMOL/L (ref 3–14)
AST SERPL W P-5'-P-CCNC: 13 U/L (ref 0–45)
BACTERIA SPEC CULT: ABNORMAL
BASOPHILS # BLD AUTO: 0 10E9/L (ref 0–0.2)
BASOPHILS NFR BLD AUTO: 0.3 %
BILIRUB SERPL-MCNC: 0.7 MG/DL (ref 0.2–1.3)
BUN SERPL-MCNC: 12 MG/DL (ref 7–30)
C COLI+JEJUNI+LARI FUSA STL QL NAA+PROBE: NOT DETECTED
C DIFF TOX B STL QL: NEGATIVE
CALCIUM SERPL-MCNC: 8 MG/DL (ref 8.5–10.1)
CHLORIDE SERPL-SCNC: 106 MMOL/L (ref 94–109)
CMV DNA SPEC NAA+PROBE-ACNC: NORMAL [IU]/ML
CMV DNA SPEC NAA+PROBE-LOG#: NORMAL {LOG_IU}/ML
CO2 SERPL-SCNC: 25 MMOL/L (ref 20–32)
CREAT SERPL-MCNC: 0.78 MG/DL (ref 0.66–1.25)
CRP SERPL-MCNC: 15 MG/L (ref 0–8)
DIFFERENTIAL METHOD BLD: NORMAL
EC STX1 GENE STL QL NAA+PROBE: NOT DETECTED
EC STX2 GENE STL QL NAA+PROBE: NOT DETECTED
ENTERIC PATHOGEN COMMENT: NORMAL
EOSINOPHIL # BLD AUTO: 0.2 10E9/L (ref 0–0.7)
EOSINOPHIL NFR BLD AUTO: 2.7 %
ERYTHROCYTE [DISTWIDTH] IN BLOOD BY AUTOMATED COUNT: 13.7 % (ref 10–15)
GFR SERPL CREATININE-BSD FRML MDRD: >90 ML/MIN/1.7M2
GLUCOSE BLDC GLUCOMTR-MCNC: 116 MG/DL (ref 70–99)
GLUCOSE BLDC GLUCOMTR-MCNC: 145 MG/DL (ref 70–99)
GLUCOSE BLDC GLUCOMTR-MCNC: 153 MG/DL (ref 70–99)
GLUCOSE BLDC GLUCOMTR-MCNC: 184 MG/DL (ref 70–99)
GLUCOSE BLDC GLUCOMTR-MCNC: 412 MG/DL (ref 70–99)
GLUCOSE SERPL-MCNC: 172 MG/DL (ref 70–99)
HCT VFR BLD AUTO: 40.3 % (ref 40–53)
HGB BLD-MCNC: 13.3 G/DL (ref 13.3–17.7)
IMM GRANULOCYTES # BLD: 0.1 10E9/L (ref 0–0.4)
IMM GRANULOCYTES NFR BLD: 0.8 %
LACTOFERRIN STL QL IA: POSITIVE
LYMPHOCYTES # BLD AUTO: 1.8 10E9/L (ref 0.8–5.3)
LYMPHOCYTES NFR BLD AUTO: 29.4 %
Lab: ABNORMAL
MAGNESIUM SERPL-MCNC: 2.1 MG/DL (ref 1.6–2.3)
MCH RBC QN AUTO: 30.2 PG (ref 26.5–33)
MCHC RBC AUTO-ENTMCNC: 33 G/DL (ref 31.5–36.5)
MCV RBC AUTO: 92 FL (ref 78–100)
MONOCYTES # BLD AUTO: 0.4 10E9/L (ref 0–1.3)
MONOCYTES NFR BLD AUTO: 7 %
NEUTROPHILS # BLD AUTO: 3.7 10E9/L (ref 1.6–8.3)
NEUTROPHILS NFR BLD AUTO: 59.8 %
NOROV GI+II ORF1-ORF2 JNC STL QL NAA+PR: NOT DETECTED
NRBC # BLD AUTO: 0 10*3/UL
NRBC BLD AUTO-RTO: 0 /100
PHOSPHATE SERPL-MCNC: 2.8 MG/DL (ref 2.5–4.5)
PLATELET # BLD AUTO: 229 10E9/L (ref 150–450)
POTASSIUM SERPL-SCNC: 3.9 MMOL/L (ref 3.4–5.3)
PROT SERPL-MCNC: 6.2 G/DL (ref 6.8–8.8)
RBC # BLD AUTO: 4.4 10E12/L (ref 4.4–5.9)
RVA NSP5 STL QL NAA+PROBE: NOT DETECTED
SALMONELLA SP RPOD STL QL NAA+PROBE: NOT DETECTED
SHIGELLA SP+EIEC IPAH STL QL NAA+PROBE: NOT DETECTED
SODIUM SERPL-SCNC: 137 MMOL/L (ref 133–144)
SPECIMEN SOURCE: ABNORMAL
SPECIMEN SOURCE: NORMAL
SPECIMEN SOURCE: NORMAL
V CHOL+PARA RFBL+TRKH+TNAA STL QL NAA+PR: NOT DETECTED
WBC # BLD AUTO: 6.3 10E9/L (ref 4–11)
Y ENTERO RECN STL QL NAA+PROBE: NOT DETECTED

## 2018-10-06 PROCEDURE — 25000128 H RX IP 250 OP 636: Performed by: NURSE PRACTITIONER

## 2018-10-06 PROCEDURE — 99233 SBSQ HOSP IP/OBS HIGH 50: CPT | Performed by: INTERNAL MEDICINE

## 2018-10-06 PROCEDURE — 87209 SMEAR COMPLEX STAIN: CPT | Performed by: NURSE PRACTITIONER

## 2018-10-06 PROCEDURE — 83630 LACTOFERRIN FECAL (QUAL): CPT | Performed by: INTERNAL MEDICINE

## 2018-10-06 PROCEDURE — 86140 C-REACTIVE PROTEIN: CPT | Performed by: INTERNAL MEDICINE

## 2018-10-06 PROCEDURE — C9113 INJ PANTOPRAZOLE SODIUM, VIA: HCPCS | Performed by: NURSE PRACTITIONER

## 2018-10-06 PROCEDURE — 25000132 ZZH RX MED GY IP 250 OP 250 PS 637: Performed by: NURSE PRACTITIONER

## 2018-10-06 PROCEDURE — 87493 C DIFF AMPLIFIED PROBE: CPT | Performed by: INTERNAL MEDICINE

## 2018-10-06 PROCEDURE — 87506 IADNA-DNA/RNA PROBE TQ 6-11: CPT | Performed by: INTERNAL MEDICINE

## 2018-10-06 PROCEDURE — 12000001 ZZH R&B MED SURG/OB UMMC

## 2018-10-06 PROCEDURE — 25000132 ZZH RX MED GY IP 250 OP 250 PS 637: Performed by: STUDENT IN AN ORGANIZED HEALTH CARE EDUCATION/TRAINING PROGRAM

## 2018-10-06 PROCEDURE — 25000128 H RX IP 250 OP 636: Performed by: INTERNAL MEDICINE

## 2018-10-06 PROCEDURE — 25000128 H RX IP 250 OP 636: Performed by: STUDENT IN AN ORGANIZED HEALTH CARE EDUCATION/TRAINING PROGRAM

## 2018-10-06 PROCEDURE — 80053 COMPREHEN METABOLIC PANEL: CPT | Performed by: INTERNAL MEDICINE

## 2018-10-06 PROCEDURE — 87177 OVA AND PARASITES SMEARS: CPT | Performed by: NURSE PRACTITIONER

## 2018-10-06 PROCEDURE — 87329 GIARDIA AG IA: CPT | Performed by: NURSE PRACTITIONER

## 2018-10-06 PROCEDURE — 25000132 ZZH RX MED GY IP 250 OP 250 PS 637: Performed by: INTERNAL MEDICINE

## 2018-10-06 PROCEDURE — 00000146 ZZHCL STATISTIC GLUCOSE BY METER IP

## 2018-10-06 PROCEDURE — 25000131 ZZH RX MED GY IP 250 OP 636 PS 637: Performed by: INTERNAL MEDICINE

## 2018-10-06 PROCEDURE — 83735 ASSAY OF MAGNESIUM: CPT | Performed by: INTERNAL MEDICINE

## 2018-10-06 PROCEDURE — 36415 COLL VENOUS BLD VENIPUNCTURE: CPT | Performed by: INTERNAL MEDICINE

## 2018-10-06 PROCEDURE — 85025 COMPLETE CBC W/AUTO DIFF WBC: CPT | Performed by: INTERNAL MEDICINE

## 2018-10-06 PROCEDURE — 84100 ASSAY OF PHOSPHORUS: CPT | Performed by: INTERNAL MEDICINE

## 2018-10-06 RX ORDER — HYDROXYZINE HYDROCHLORIDE 25 MG/1
25 TABLET, FILM COATED ORAL 3 TIMES DAILY PRN
Status: DISCONTINUED | OUTPATIENT
Start: 2018-10-06 | End: 2018-10-10 | Stop reason: HOSPADM

## 2018-10-06 RX ORDER — KETOROLAC TROMETHAMINE 15 MG/ML
15 INJECTION, SOLUTION INTRAMUSCULAR; INTRAVENOUS ONCE
Status: COMPLETED | OUTPATIENT
Start: 2018-10-06 | End: 2018-10-06

## 2018-10-06 RX ORDER — HYOSCYAMINE SULFATE 0.125 MG
0.12 TABLET,DISINTEGRATING ORAL EVERY 4 HOURS PRN
Status: DISCONTINUED | OUTPATIENT
Start: 2018-10-06 | End: 2018-10-10 | Stop reason: HOSPADM

## 2018-10-06 RX ORDER — CALCIUM CARBONATE 500 MG/1
500 TABLET, CHEWABLE ORAL DAILY PRN
Status: DISCONTINUED | OUTPATIENT
Start: 2018-10-06 | End: 2018-10-10 | Stop reason: HOSPADM

## 2018-10-06 RX ADMIN — CALCIUM CARBONATE (ANTACID) CHEW TAB 500 MG 500 MG: 500 CHEW TAB at 03:13

## 2018-10-06 RX ADMIN — SODIUM CHLORIDE: 9 INJECTION, SOLUTION INTRAVENOUS at 09:03

## 2018-10-06 RX ADMIN — MUPIROCIN: 20 OINTMENT TOPICAL at 19:57

## 2018-10-06 RX ADMIN — INSULIN ASPART 1 UNITS: 100 INJECTION, SOLUTION INTRAVENOUS; SUBCUTANEOUS at 17:12

## 2018-10-06 RX ADMIN — POLYETHYLENE GLYCOL 3350 17 G: 17 POWDER, FOR SOLUTION ORAL at 08:45

## 2018-10-06 RX ADMIN — MUPIROCIN: 20 OINTMENT TOPICAL at 08:57

## 2018-10-06 RX ADMIN — KETOROLAC TROMETHAMINE 15 MG: 15 INJECTION, SOLUTION INTRAMUSCULAR; INTRAVENOUS at 05:23

## 2018-10-06 RX ADMIN — INSULIN GLARGINE 10 UNITS: 100 INJECTION, SOLUTION SUBCUTANEOUS at 08:51

## 2018-10-06 RX ADMIN — HYOSCYAMINE SULFATE 0.12 MG: 0.12 TABLET, ORALLY DISINTEGRATING ORAL at 17:17

## 2018-10-06 RX ADMIN — PANTOPRAZOLE SODIUM 40 MG: 40 INJECTION, POWDER, FOR SOLUTION INTRAVENOUS at 19:57

## 2018-10-06 RX ADMIN — POTASSIUM CHLORIDE 20 MEQ: 750 TABLET, EXTENDED RELEASE ORAL at 21:04

## 2018-10-06 RX ADMIN — DOXYCYCLINE HYCLATE 100 MG: 100 CAPSULE ORAL at 08:45

## 2018-10-06 RX ADMIN — RANITIDINE 150 MG: 150 TABLET ORAL at 08:45

## 2018-10-06 RX ADMIN — PANTOPRAZOLE SODIUM 40 MG: 40 INJECTION, POWDER, FOR SOLUTION INTRAVENOUS at 08:45

## 2018-10-06 RX ADMIN — HYDROXYZINE HYDROCHLORIDE 25 MG: 25 TABLET ORAL at 13:10

## 2018-10-06 RX ADMIN — INSULIN ASPART 1 UNITS: 100 INJECTION, SOLUTION INTRAVENOUS; SUBCUTANEOUS at 08:55

## 2018-10-06 RX ADMIN — POLYETHYLENE GLYCOL 3350 17 G: 17 POWDER, FOR SOLUTION ORAL at 14:13

## 2018-10-06 RX ADMIN — CALCIUM CARBONATE (ANTACID) CHEW TAB 500 MG 500 MG: 500 CHEW TAB at 21:06

## 2018-10-06 RX ADMIN — DOXYCYCLINE HYCLATE 100 MG: 100 CAPSULE ORAL at 19:58

## 2018-10-06 RX ADMIN — POLYETHYLENE GLYCOL 3350 17 G: 17 POWDER, FOR SOLUTION ORAL at 19:57

## 2018-10-06 RX ADMIN — RANITIDINE 150 MG: 150 TABLET ORAL at 19:58

## 2018-10-06 RX ADMIN — GABAPENTIN 100 MG: 100 CAPSULE ORAL at 14:13

## 2018-10-06 RX ADMIN — HYOSCYAMINE SULFATE 0.12 MG: 0.12 TABLET, ORALLY DISINTEGRATING ORAL at 08:45

## 2018-10-06 RX ADMIN — POTASSIUM CHLORIDE 20 MEQ: 750 TABLET, EXTENDED RELEASE ORAL at 03:13

## 2018-10-06 RX ADMIN — GABAPENTIN 100 MG: 100 CAPSULE ORAL at 08:44

## 2018-10-06 RX ADMIN — ONDANSETRON 4 MG: 4 TABLET, ORALLY DISINTEGRATING ORAL at 19:58

## 2018-10-06 RX ADMIN — HYOSCYAMINE SULFATE 0.12 MG: 0.12 TABLET, ORALLY DISINTEGRATING ORAL at 13:11

## 2018-10-06 RX ADMIN — MUPIROCIN: 20 OINTMENT TOPICAL at 14:14

## 2018-10-06 RX ADMIN — HYOSCYAMINE SULFATE 0.12 MG: 0.12 TABLET, ORALLY DISINTEGRATING ORAL at 19:58

## 2018-10-06 RX ADMIN — GABAPENTIN 100 MG: 100 CAPSULE ORAL at 19:58

## 2018-10-06 ASSESSMENT — ACTIVITIES OF DAILY LIVING (ADL)
ADLS_ACUITY_SCORE: 10

## 2018-10-06 ASSESSMENT — PAIN DESCRIPTION - DESCRIPTORS
DESCRIPTORS: CRAMPING

## 2018-10-06 NOTE — PROGRESS NOTES
[facility], [place]    Internal Medicine Progress Note - [facility] 5 Service    Assessment & Plan   Andrew Lockwood is a 50 year old male with a history of IDDM, HIV/AIDS c/b CNS toxoplasmosis and chronic abdominal pain s/p appendectomy and recent SBO s/p adhesion takedown admitted with acute on chronic abdominal pain and partial SBO.     #Acute on chronic abdominal pain  #Partial SBO  Patient has a complex history of recurrent abdominal pain over the last few years with multiple admissions, most recently with SBO which resolved with medical management. Prior to this in April 2018 he had an ex lap with lysis of adhesions. There are signs of enteritis on imaging, and his symptoms fit with this, unclear how much his risk of immunocompromise contributes. He has elevated inflammatory markers and + lactoferin, however, this can be due to viral enteritis. Previous scopes did not yield clear etiology. Clinically he is improving, though etiology of this remains somewhat unclear.  - Surgery signed off, recommend medical management  - GI following, appreciate recs  - negative c diff, enteric panel  - + lactoferin  - pending giardia, O&P, CMV PCR  - optimize lytes   - daily BMP, Mg, Phos  - protonix BID  - scheduled miralax TID, scheduled senna BID  - enema prn  - hyoscyamine TID  - gabapentin TID  - zofran BID     #SSI of the right forearm  Bug bite that turned into skin and soft tissue infection with induration and history of purulence. No evidence of abscess, as it is draining. No systemic symptoms. Swabbed in clinic, growing MSSA. Responding to treatment.  - doxycycline x 7 days  - topical mupirocin TID x 5 days     #HIV  Followed in ID Clinic. Last CD4 317, HIV <20. On treatment.  - continue home bictegravir-emtricitabine-tenofovir daily     #insulin dependent DM  Last hgb A1c 8.7. Glucoses within goal.  - glucose AC/HS  - reduced long acting insulin, low intensity SSI  - hypoglycemia protocol  -  hold home empagliflozin    Diet: Advance Diet as Tolerated: Clear Liquid Diet  Fluids: NS MIVF  Lines: PIV  Potts Catheter: not present  DVT Prophylaxis: low risk  Code Status: Full Code  Disposition Plan   Expected discharge: likely tomorrow, recommended to prior living arrangement once GI evaluation complete, pain improved, tolerating diet..     Entered: Mario Bone MD 10/06/2018, 6:58 AM   Information in the above section will display in the discharge planner report.      The patient's care was discussed with the Attending Physician, Dr. Quiroz.    Mario Bone  [facility]  Pager: [phone]  Please see sticky note for cross cover information    Interval History   No acute events. Had large BM. Has some abd pain in LLQ but this is improving. Tolerating liquids. Passing flatus. Denies fevers, chills.    Physical Exam   Vital Signs: Temp: 96.8  F (36  C) Temp src: Oral BP: 98/60 Pulse: 86 Heart Rate: 69 Resp: 16 SpO2: 98 % O2 Device: None (Room air)    Weight: 171 lbs 11.2 oz  General Appearance: lying in bed, mild discomfort with intermittent pain  Respiratory: CTAB, no w/r/r  Cardiovascular: RRR, no m/g/r  GI: distended but soft, mildly TTP in LLQ with no rigidity or guarding, BS+  Skin: former lap site scars  Other: alert, no focal deficits     Data     Recent Labs  Lab 10/05/18  0818   WBC 10.4   HGB 15.7   MCV 90         POTASSIUM 3.3*   CHLORIDE 104   CO2 24   BUN 13   CR 0.81   ANIONGAP 10   LINDA 9.6   *   ALBUMIN 3.8   PROTTOTAL 8.0   BILITOTAL 0.7   ALKPHOS 144   ALT 25   AST 15   LIPASE 219       Recent Results (from the past 24 hour(s))   XR Abdomen 2 Views    Narrative    Exam: XR ABDOMEN 2 VW 10/5/2018 9:00 AM    History: HIV/AIDS, history of small bowel obstruction    Comparison: CT 9/27/2018    Findings: Upright and supine views of the abdomen were evaluated.  There is a relative paucity of small bowel gas. No portal venous gas,  pneumatosis,  or free intraperitoneal gas. Lung bases are relatively  clear. No acute bony abnormality.      Impression    Impression: Not certain bowel gas pattern. Mild colonic stool burden.    ALEJANDRINA MARS MD   CT Abdomen Pelvis w Contrast    Narrative    EXAMINATION: CT ABDOMEN PELVIS W CONTRAST, 10/5/2018 10:56 AM    TECHNIQUE:  Helical CT images from the lung bases through the  symphysis pubis were obtained  with IV contrast. Contrast dose:  iopamidol (ISOVUE-370) solution 105 mL    COMPARISON: 9/27/2018    HISTORY: abdl pain, n/v HIV;     FINDINGS:  Mild dependent atelectasis in lung bases.    Stomach remains distended with debris, similar to previous. There is  continued hyperemia and wall thickening on the stomach most pronounced  in the antrum but also involving the gastric body to greater extent  than on previous exam.    In the mid abdomen small bowel, best seen on series 3 image 23, there  is focal bowel dilation measuring up to 4.5 cm which is just proximal  to an area of narrowing, wall thickening, and hyperemia measuring  approximately 5 cm. Fecalization of the dilated segment. The  inflammatory changes involving the thickened segment of bowel extend  into the associated mesentery. This inflammation has progressed from  previous and the bowel dilation is new from previous.    Colon is unremarkable. No adenopathy in the abdomen or pelvis.  Scattered atherosclerotic vascular calcifications. Small hypodensity  in the right hepatic lobe on series 5 image 121 has benign appearance  and is stable from comparison exam, small to fully characterize. The  pancreas, adrenal glands, spleen, gallbladder, and kidneys are  unremarkable. Penile prosthesis is again seen with reservoir in the  left pelvis. Bladder is unremarkable.    No aggressive bone lesion.      Impression    IMPRESSION:   1. Focal mid small bowel enteritis associated wall thickening,  narrowing, inflammation. There is new associated dilation of  visualized  bowel proximal to this area of inflammation compatible with  a functional partial obstruction. Infectious or inflammatory  etiologies should be considered.  2. Continued hyperemic appearance of the stomach with questionable  wall thickening, potentially representing gastritis.    ALFREDA SHIPLEY MD     ATTENDING ATTESTATION  Patient seen, examined and discussed with resident(s) or advanced practice provider. I agree with the key elements of findings and plan described above, with edits made to note above as needed.    Inderjit Quiroz MD  [facility]

## 2018-10-06 NOTE — PLAN OF CARE
Problem: Patient Care Overview  Goal: Plan of Care/Patient Progress Review  Outcome: No Change  Pt slept most of day. VSS on RA. Up IND. C/o abd cramping, some relief with PO meds. No nausea. Tolerating PO, requested diet to be advanced to regular, so far tolerating. Voiding. Reports BM x2 today. Wound care done to Rt arm. Poss discharge tomorrow.

## 2018-10-06 NOTE — PROVIDER NOTIFICATION
"Pt called to report BM. Stool was large, both firm and soft stool present. Pt reports abdominal pain continues, requesting pain medication. Stool samples collected.    MD paged: \"[phone]. [phone]. M.P. Pt had large BM, both firm and soft stool. Pt asking for pain medication, abdominal pain still present. Thanks SENG Landry [phone]\"  "

## 2018-10-06 NOTE — PLAN OF CARE
Problem: Patient Care Overview  Goal: Plan of Care/Patient Progress Review  Outcome: No Change  7p-11p.Pt A&O, VSS on RA. Frequent requests. PIV in L arm infusing /hr. Mupirocin applied to R forearm wound, primapore in place. Pt up independently. No BM this shift, pt feeling constipated, fleet enema offered, refused and would like pink lady enema, MD notified. Potassium replaced for level 3.3. Plan to monitor overnight.

## 2018-10-07 LAB
ALBUMIN SERPL-MCNC: 2.9 G/DL (ref 3.4–5)
ALP SERPL-CCNC: 104 U/L (ref 40–150)
ALT SERPL W P-5'-P-CCNC: 18 U/L (ref 0–70)
ANION GAP SERPL CALCULATED.3IONS-SCNC: 8 MMOL/L (ref 3–14)
AST SERPL W P-5'-P-CCNC: 10 U/L (ref 0–45)
BASOPHILS # BLD AUTO: 0 10E9/L (ref 0–0.2)
BASOPHILS NFR BLD AUTO: 0.4 %
BILIRUB SERPL-MCNC: 0.8 MG/DL (ref 0.2–1.3)
BUN SERPL-MCNC: 11 MG/DL (ref 7–30)
CALCIUM SERPL-MCNC: 8.6 MG/DL (ref 8.5–10.1)
CHLORIDE SERPL-SCNC: 106 MMOL/L (ref 94–109)
CO2 SERPL-SCNC: 25 MMOL/L (ref 20–32)
CREAT SERPL-MCNC: 0.72 MG/DL (ref 0.66–1.25)
DIFFERENTIAL METHOD BLD: NORMAL
EOSINOPHIL # BLD AUTO: 0.2 10E9/L (ref 0–0.7)
EOSINOPHIL NFR BLD AUTO: 3 %
ERYTHROCYTE [DISTWIDTH] IN BLOOD BY AUTOMATED COUNT: 13.7 % (ref 10–15)
GFR SERPL CREATININE-BSD FRML MDRD: >90 ML/MIN/1.7M2
GLUCOSE BLDC GLUCOMTR-MCNC: 160 MG/DL (ref 70–99)
GLUCOSE BLDC GLUCOMTR-MCNC: 180 MG/DL (ref 70–99)
GLUCOSE BLDC GLUCOMTR-MCNC: 259 MG/DL (ref 70–99)
GLUCOSE BLDC GLUCOMTR-MCNC: 288 MG/DL (ref 70–99)
GLUCOSE BLDC GLUCOMTR-MCNC: 298 MG/DL (ref 70–99)
GLUCOSE SERPL-MCNC: 183 MG/DL (ref 70–99)
HCT VFR BLD AUTO: 41.5 % (ref 40–53)
HGB BLD-MCNC: 13.6 G/DL (ref 13.3–17.7)
IMM GRANULOCYTES # BLD: 0.1 10E9/L (ref 0–0.4)
IMM GRANULOCYTES NFR BLD: 0.9 %
LYMPHOCYTES # BLD AUTO: 1.7 10E9/L (ref 0.8–5.3)
LYMPHOCYTES NFR BLD AUTO: 31.7 %
MCH RBC QN AUTO: 30.5 PG (ref 26.5–33)
MCHC RBC AUTO-ENTMCNC: 32.8 G/DL (ref 31.5–36.5)
MCV RBC AUTO: 93 FL (ref 78–100)
MONOCYTES # BLD AUTO: 0.5 10E9/L (ref 0–1.3)
MONOCYTES NFR BLD AUTO: 9.1 %
NEUTROPHILS # BLD AUTO: 2.9 10E9/L (ref 1.6–8.3)
NEUTROPHILS NFR BLD AUTO: 54.9 %
NRBC # BLD AUTO: 0 10*3/UL
NRBC BLD AUTO-RTO: 0 /100
PLATELET # BLD AUTO: 231 10E9/L (ref 150–450)
POTASSIUM SERPL-SCNC: 3.9 MMOL/L (ref 3.4–5.3)
PROT SERPL-MCNC: 6.6 G/DL (ref 6.8–8.8)
RBC # BLD AUTO: 4.46 10E12/L (ref 4.4–5.9)
SODIUM SERPL-SCNC: 138 MMOL/L (ref 133–144)
WBC # BLD AUTO: 5.4 10E9/L (ref 4–11)

## 2018-10-07 PROCEDURE — 40000141 ZZH STATISTIC PERIPHERAL IV START W/O US GUIDANCE

## 2018-10-07 PROCEDURE — 25000132 ZZH RX MED GY IP 250 OP 250 PS 637: Performed by: INTERNAL MEDICINE

## 2018-10-07 PROCEDURE — 87177 OVA AND PARASITES SMEARS: CPT | Performed by: INTERNAL MEDICINE

## 2018-10-07 PROCEDURE — 87209 SMEAR COMPLEX STAIN: CPT | Performed by: INTERNAL MEDICINE

## 2018-10-07 PROCEDURE — 99233 SBSQ HOSP IP/OBS HIGH 50: CPT | Performed by: INTERNAL MEDICINE

## 2018-10-07 PROCEDURE — 00000146 ZZHCL STATISTIC GLUCOSE BY METER IP

## 2018-10-07 PROCEDURE — 25000131 ZZH RX MED GY IP 250 OP 636 PS 637: Performed by: INTERNAL MEDICINE

## 2018-10-07 PROCEDURE — 25000132 ZZH RX MED GY IP 250 OP 250 PS 637: Performed by: STUDENT IN AN ORGANIZED HEALTH CARE EDUCATION/TRAINING PROGRAM

## 2018-10-07 PROCEDURE — 12000001 ZZH R&B MED SURG/OB UMMC

## 2018-10-07 PROCEDURE — 80053 COMPREHEN METABOLIC PANEL: CPT | Performed by: INTERNAL MEDICINE

## 2018-10-07 PROCEDURE — 25000128 H RX IP 250 OP 636: Performed by: INTERNAL MEDICINE

## 2018-10-07 PROCEDURE — 36415 COLL VENOUS BLD VENIPUNCTURE: CPT | Performed by: INTERNAL MEDICINE

## 2018-10-07 PROCEDURE — 85025 COMPLETE CBC W/AUTO DIFF WBC: CPT | Performed by: INTERNAL MEDICINE

## 2018-10-07 RX ORDER — MAGNESIUM OXIDE 400 MG/1
400 TABLET ORAL DAILY
Status: DISCONTINUED | OUTPATIENT
Start: 2018-10-07 | End: 2018-10-10 | Stop reason: HOSPADM

## 2018-10-07 RX ORDER — PANTOPRAZOLE SODIUM 40 MG/1
40 TABLET, DELAYED RELEASE ORAL
Status: DISCONTINUED | OUTPATIENT
Start: 2018-10-07 | End: 2018-10-10 | Stop reason: HOSPADM

## 2018-10-07 RX ORDER — SODIUM CHLORIDE, SODIUM LACTATE, POTASSIUM CHLORIDE, CALCIUM CHLORIDE 600; 310; 30; 20 MG/100ML; MG/100ML; MG/100ML; MG/100ML
INJECTION, SOLUTION INTRAVENOUS CONTINUOUS
Status: DISCONTINUED | OUTPATIENT
Start: 2018-10-07 | End: 2018-10-08

## 2018-10-07 RX ADMIN — PANTOPRAZOLE SODIUM 40 MG: 40 TABLET, DELAYED RELEASE ORAL at 07:34

## 2018-10-07 RX ADMIN — INSULIN ASPART 2 UNITS: 100 INJECTION, SOLUTION INTRAVENOUS; SUBCUTANEOUS at 18:30

## 2018-10-07 RX ADMIN — DOXYCYCLINE HYCLATE 100 MG: 100 CAPSULE ORAL at 19:51

## 2018-10-07 RX ADMIN — MAGNESIUM OXIDE TAB 400 MG (241.3 MG ELEMENTAL MG) 400 MG: 400 (241.3 MG) TAB at 15:41

## 2018-10-07 RX ADMIN — RANITIDINE 150 MG: 150 TABLET ORAL at 19:51

## 2018-10-07 RX ADMIN — HYDROXYZINE HYDROCHLORIDE 25 MG: 25 TABLET ORAL at 02:31

## 2018-10-07 RX ADMIN — PANTOPRAZOLE SODIUM 40 MG: 40 TABLET, DELAYED RELEASE ORAL at 15:41

## 2018-10-07 RX ADMIN — SODIUM CHLORIDE, POTASSIUM CHLORIDE, SODIUM LACTATE AND CALCIUM CHLORIDE: 600; 310; 30; 20 INJECTION, SOLUTION INTRAVENOUS at 13:56

## 2018-10-07 RX ADMIN — INSULIN GLARGINE 10 UNITS: 100 INJECTION, SOLUTION SUBCUTANEOUS at 07:32

## 2018-10-07 RX ADMIN — GABAPENTIN 100 MG: 100 CAPSULE ORAL at 07:34

## 2018-10-07 RX ADMIN — CALCIUM CARBONATE (ANTACID) CHEW TAB 500 MG 500 MG: 500 CHEW TAB at 05:50

## 2018-10-07 RX ADMIN — HYOSCYAMINE SULFATE 0.12 MG: 0.12 TABLET, ORALLY DISINTEGRATING ORAL at 13:51

## 2018-10-07 RX ADMIN — MUPIROCIN: 20 OINTMENT TOPICAL at 15:44

## 2018-10-07 RX ADMIN — GABAPENTIN 100 MG: 100 CAPSULE ORAL at 13:51

## 2018-10-07 RX ADMIN — INSULIN ASPART 1 UNITS: 100 INJECTION, SOLUTION INTRAVENOUS; SUBCUTANEOUS at 07:32

## 2018-10-07 RX ADMIN — DOXYCYCLINE HYCLATE 100 MG: 100 CAPSULE ORAL at 07:34

## 2018-10-07 RX ADMIN — INSULIN ASPART 2 UNITS: 100 INJECTION, SOLUTION INTRAVENOUS; SUBCUTANEOUS at 13:51

## 2018-10-07 RX ADMIN — HYOSCYAMINE SULFATE 0.12 MG: 0.12 TABLET, ORALLY DISINTEGRATING ORAL at 19:51

## 2018-10-07 RX ADMIN — HYOSCYAMINE SULFATE 0.12 MG: 0.12 TABLET, ORALLY DISINTEGRATING ORAL at 07:34

## 2018-10-07 RX ADMIN — MUPIROCIN: 20 OINTMENT TOPICAL at 19:51

## 2018-10-07 RX ADMIN — ONDANSETRON 4 MG: 4 TABLET, ORALLY DISINTEGRATING ORAL at 07:34

## 2018-10-07 RX ADMIN — ONDANSETRON 4 MG: 4 TABLET, ORALLY DISINTEGRATING ORAL at 19:51

## 2018-10-07 RX ADMIN — RANITIDINE 150 MG: 150 TABLET ORAL at 07:34

## 2018-10-07 RX ADMIN — GABAPENTIN 100 MG: 100 CAPSULE ORAL at 19:51

## 2018-10-07 RX ADMIN — MUPIROCIN: 20 OINTMENT TOPICAL at 07:34

## 2018-10-07 ASSESSMENT — ACTIVITIES OF DAILY LIVING (ADL)
ADLS_ACUITY_SCORE: 10

## 2018-10-07 ASSESSMENT — PAIN DESCRIPTION - DESCRIPTORS: DESCRIPTORS: CRAMPING

## 2018-10-07 NOTE — PROGRESS NOTES
GASTROENTEROLOGY PROGRESS NOTE  Date of Service: 10/7/2018    ASSESSMENT:  Andrew Lockwood is a 50 year old male with a history of HIV/AIDS on Biktarvy, DM type II, prior CNS toxoplasmosis, acute appendicitis s/p appendectomy on 1/31/18 and c/b abscess, SBO s/p lysis of adhesions, with chronic abdominal pain (previously attributed to intermittent SBOs and adhesive disease) who presents with acute worsening of his pain.       Brief summary of prior CT scans since pain began:  1/1/16 CT showed kidney stone, no mention of bowel obstruction  1/3/16 CT showed questionable bowel obstruction based on dilation  7/21/16 CT showed low-grade small bowel obstruction in mid left abdomen  7/24/16 CT showed persistent dilated small bowel in mid abdomen   --> that admission, patient had ex lap with running of the bowel from Ligament of Trietz on. This showed only mild erythema and dilation of one loop of bowel though NO inflammation nor evidence of IBD. No obstruction was.  8/25/16 CT showed stable focal dilated loop of small bowel and severe gastric distention (though SB dilation improved since Aug)  11/6/17 CT showed circumferential thickening of the pylorus and single dilated loop of small bowel concerning for partial obstruction.     --> Patient had EGD and colonoscopy that did not show evidence of IBD.  1/31/18 abdominal CT showed acute appendicitis    --> Patient had appendectomy  2/4/18 CT showed abscess in the right lower quadrant    --> s/p right percutaneous drain placement  2/10/18 abdominal CT showed right percutaneous drain with decreased size of abdominal abscess (no bowel dilations noted)  4/16/18 abdominal CT showed small bowel obstruction with 2 possible transition point with potential close loops of of obstruction   --> s/p exploratory laparoscopy with lysis of adhesions, SB examined from TI to ligament of Trietz x2, some areas of dilation of both SB and large bowel though no inflammation and no transition  point  4/20/18 abdominal CTs showed postsurgical changes and resolution of small bowel obstruction  7/4/18 abdominal CT showed small bowel obstruction with transition point in the right lower quadrant  9/10/18 abdominal CT showed loops of mildly dilated small bowel in the central abdomen and gentle transition point in the right mid abdomen  9/27/18 there was no bowel obstruction seen on CT but there was a gastric wall thickening concerning for gastritis    Concern for SB obstructions prior to any surgery starting in 2016. Imaging until 4/2018 only revealed areas of bowel dilation/distention.  Transition points not seen until patient had undergone a few surgeries. Notably ex lap done after one of these scans these did not reveal a transition point.    ? If always recurrent SBO - and if so, what was the  of these before surgical adhesions. ?small bowel intraluminal lesion  Vs.  ?Intermittent enteritis with dilation in setting of adhesive disease  Vs.  ?Pt with SB dilation on imaging more suggestive of underlying dysmotility disorder?  Though picture further complicated by multiple surgeries and appendicitis with post-op abscess which has led to adhesive disease.     Given chronic pain, unclear to what degree pain is attributable to altered pain pathways. May be an avenue that can be pursued once acute issues are addressed.         No evidence at this time to suggest Crohn's disease (negative EGD and colonoscopy, negative ex lap with running of the bowel x 2, appendix pathology was consistent with acute appendicitis).   May be useful to further assess small bowel for intraluminal lesions- not ideal capsule candidate (?patency capsule), though would like target if pursuing deep bowel enteroscopy. Will review imaging with radiology.     Agree with evaluation for infectious causes of enteritis.    Notably has baseline, pronounced constipation which is, at least in part, diet-related, unclear if component of  "dysmotility from currently available data. This should be more aggressively managed and may improve overall symptoms.  Pt also with bloating, improves with flatus and worsened with ingestion of high gas foods. Will attempt to address this as below in addition to optimizing bowel pattern.       RECOMMENDATIONS:  - would hold on ANCA/IBD serologic testing, unlikely to provide additional useful information at this time  - agree with clears and slow advancement of diet as you are  - given pt's intolerance to Miralax --> recommend Mg oxide 400-500 mg daily as osmotic laxative, titrate to effect up to 1000 mg daily  - please avoid stimulant laxatives at this time  - ok for glycerin suppositories as needed  - as diet is advanced recommend a low gas diet (avoiding cruciferous vegetables, cabbage, beans, etc).  - recommend lifestyle changes to limit aerophagia and thereby excess luminal gaseous distention (no carbonated beverages, no use ofstraws, no gum, no hard candy). No gulping.   - recommend increased walking 10 minutes a day three times a day at minimum  - pending clinical progress/resolution of SBO/ileus --> may consider empiric course of rifaximin for SIBO  - Ok for simethicone or Bean-o prn      Suly Molina MD  Gastroenterology - Luminal Service  _______________________________________________________________  S:  Nurses' notes reviewed. No acute events overnight.     Mr. Lockwood reports that he feels \"ok\" today.  In discussion he describes that pain has never left since it started two years ago. There are periods of acute worsening which prompt him to seek care. He feels his pain never improves during any of his hospitalizations, but may improve slightly over time. Pain is just above and to the right of his umbilicus and described as a \"sharp\" burning. When the pain is worse it is often accompanied by subjective bloating and abdominal distention. He denies every experiencing much in the way of " nausea.    He feels that eating beans, vegetables, particularly uncooked are more likely to worsening bloating and distention. Bloating is improved with flatus. He's unsure if bloating improves with moving his bowels as this happens irregularly (see below).     Bowel movements are irregular. Always [place]co 1-2 with straining. Noted access to food is variable and generally low-fiber items.     O:  Temp:  [97.3  F (36.3  C)-98.3  F (36.8  C)] 97.5  F (36.4  C)  Heart Rate:  [67-79] 77  Resp:  [16-20] 16  BP: ()/(58-71) 121/67  SpO2:  [97 %-100 %] 99 %  Gen: Pt lying in bed in NAD. Pleasant and cooperative on exam.  HEENT: NC, AT, sclerae anicteric , MMM  Cardiac: RRR, nl S1, S2   Resp: Clear anteriorly  Abd: Hypoactive BS, abd soft, non-distended, no tympany, mild tenderness just above and to the right of umbilicus with deep palpation, no rebound  Ext: Warm, well-perfused  Neuro: Alert, answers questions appropriately      LABS:  BMP  Recent Labs  Lab 10/07/18  0813 10/06/18  0658 10/05/18  0818    137 138   POTASSIUM 3.9 3.9 3.3*   CHLORIDE 106 106 104   ILNDA 8.6 8.0* 9.6   CO2 25 25 24   BUN 11 12 13   CR 0.72 0.78 0.81   * 172* 225*     CBC  Recent Labs  Lab 10/07/18  0813 10/06/18  0658 10/05/18  0818   WBC 5.4 6.3 10.4   RBC 4.46 4.40 4.98   HGB 13.6 13.3 15.7   HCT 41.5 40.3 44.7   MCV 93 92 90   MCH 30.5 30.2 31.5   MCHC 32.8 33.0 35.1   RDW 13.7 13.7 13.8    229 282     LFTs  Recent Labs  Lab 10/07/18  0813 10/06/18  0658 10/05/18  0818   ALKPHOS 104 100 144   AST 10 13 15   ALT 18 19 25   BILITOTAL 0.8 0.7 0.7   PROTTOTAL 6.6* 6.2* 8.0   ALBUMIN 2.9* 2.8* 3.8      PANC  Recent Labs  Lab 10/05/18  0818   LIPASE 219

## 2018-10-07 NOTE — PROGRESS NOTES
"CLINICAL NUTRITION SERVICES    Reason for Assessment:  Received nutrition education consult for \"patient education in setting of chronic abd pain. Patient with possible functional bowel disease with contribution from flatus. Please educate on low gas diet--including avoidance of cruciferous vegetables, beans, etc\"    Diet History:  Pt reports not receiving nutrition education regarding nutrition education for low gas diet.    Nutrition Diagnosis:  Food- and nutrition-related knowledge deficit r/t no previous knowledge of low gas diet AEB pt report of no previous formal nutrition education on low gas diet in the past.    Interventions:  Nutrition Education - Provided verbal instruction on a low gas diet. Pt was sleepy during education. Gave some examples of foods that are well tolerated and foods that are not well tolerated and discussed various high fiber foods that may not be as well tolerated, especially in the acute setting once diet is advanced. Answered questions. Provided handout: Irritable Bowel Syndrome Nutrition Therapy.    Goals:    Pt will verbalize two foods that may be well tolerated (less gas-forming) and two foods that are not well tolerated (gas-forming).    Follow-up:   Patient to ask any further nutrition-related questions before discharge  In addition, pt may request outpatient RD appointment.    Na Winston, MS, RD, LD, CNSC   Saturday/Sunday Pgr:  [phone]      5A/7B RD pager: [phone]  "

## 2018-10-07 NOTE — PROGRESS NOTES
[facility], [place]    Internal Medicine Progress Note - [facility] Service      Assessment & Plan   Andrew Lockwood is a 50 year old male admitted on 10/5/2018. He has a history of IDDM, HIV on ART (CD4 > 300 in August, w VLUD), recent SBO with lysis of adhesions.    # Abdominal pain  # PO intolerance  Ddx remains broad, especially with non-specific sx, small bowel thickening seen on CT, possible immunosuppression and history of IBD. Notable studies as below. Based on data so far, suspect ileus or functional pain associated with recent surgery, infectious enteritis or, less likely, IBD. Evaluation for infections has been undertaken as below. Will add serologic eval for Crohn's disease, as positive results on this front might prompt enteroscopy/biopsy of small bowel inflammation  - Normal/negative studies: Lipase, serum CMV PCR (not detected), stool enteric panel, CDiff  - Fecal lactoferrin positive  - CRP = 14 --> 15, ESR = 23  - ANCA, OmpC  - Hold miralax, which he is refusing  - Contiune PPI, hyoscyamine, gabapentin  - Avoid opioids, as they will contribute to dysmotility  - Decrease food to CLD  - mIVF today  - Check BMP with AML    #SSI of the right forearm  Bug bite that turned into skin and soft tissue infection with induration and history of purulence. No evidence of abscess, as it is draining. No systemic symptoms. Swabbed in clinic, growing MSSA. Responding to treatment.  - doxycycline x 7 days  - topical mupirocin TID x 5 days      #HIV  Followed in [facility]. Last CD4 317, HIV <20. On treatment.  - continue home bictegravir-emtricitabine-tenofovir daily      #insulin dependent DM  Last hgb A1c 8.7. Glucoses within goal.  - glucose AC/HS  - reduced long acting insulin, low intensity SSI  - hypoglycemia protocol  - hold home empagliflozin    Diet: Advance Diet as Tolerated: Regular Diet Adult  Fluids: starting mIVF today given minimal intake  Potts Catheter: not present  DVT  Prophylaxis: Ambulate every shift  Code Status: Full Code      Inderjit Quiroz MD  Internal Medicine Staff Hospitalist Service  [facility]  Please see sticky note for cross cover information    Interval History      - Continues to have severe abdominal pain. This is worsened by food and by miralax.   - Pain is located L of midline at about the level of the pelvic brim. No radiation.   - Minimal improvement with gabapentin and hyoscyamine.   - States that he's unable to eat. If discharged, would come right back to the ED because of pain.     Data reviewed today: I reviewed all medications, new labs and imaging results over the last 24 hours.    Physical Exam   Vital Signs: Temp: 97.5  F (36.4  C) Temp src: Oral BP: 121/67   Heart Rate: 77 Resp: 16 SpO2: 99 % O2 Device: None (Room air)    Weight: 172 lbs 3.2 oz  Lying flat in bed in NAD  OP slightly dry  Cor regular and without murmurs.   Normal resp rate and effort.   Ant and post lungs  CTA.   Radial pulses full and symmetric.

## 2018-10-07 NOTE — PLAN OF CARE
Problem: Patient Care Overview  Goal: Plan of Care/Patient Progress Review  Outcome: No Change  07:00-11:00  Pt A/Ox4, VSS and C/O abd pain. Pt declined AM miralax d/t abd discomfort. Pt had education on importance of keeping up with bowel regiment but pt still refused. K 3.9 this AM. Pt refusing replacement, states it gives him heartburn. PRN tums already given overnight to help with heartburn. Reg diet. L PIV SL. Pt had new stool sample ordered for ova and parasite r/o. Pt informed, no sample produced yet. Pt on enteric ISO, all enteric results negative so far. Plan was to possibly discharge to shelter today, will assess if pt is medically stable and determine discontinue plan.    L PIV occluded and removed. Pt does not want new PIV. Pt has no scheduled IV meds. MD informed of pts refusal at [id number].

## 2018-10-07 NOTE — PLAN OF CARE
Problem: Patient Care Overview  Goal: Plan of Care/Patient Progress Review  Outcome: No Change  Reason for admission: Abdominal pain, partial SBO.   Hx: HIV/AIDs c/b toxoplasmosis and chronic abdominal pain post appendectomy.   Neuro: A&Ox4. Calls appropriately.   Activity: Up ad lexi, stable when up.   Vitals: VSS on Ra. /65.       LDAS: L PIV S/L- IV appears occluded, IV prednisone changed to PO, per MD.   Cardiac: No acute changes this shift.   Respiratory: Stable on RA. LS clear/dim.      GI/: No BM this shift. Voiding WDL.    Pain: C/o abdominal bloating, managed with TUMS.   Diet: Regular diet.   Plan: Stool sample needed-supplies in bathroom.  Possible discharge today. Will continue to monitor and follow POC.

## 2018-10-07 NOTE — PLAN OF CARE
Problem: Patient Care Overview  Goal: Plan of Care/Patient Progress Review  Outcome: No Change  Pt AO. Sleeping between cares. VSS on RA. Up IND. C/o abd discomfort/distention. No nausea. Diet changed back to clear liquids. Voiding. Scant amount formed stool x1, sent to lab. MIVF restarted. Seen by dietician for education re: low gas foods.

## 2018-10-08 LAB
ALBUMIN SERPL-MCNC: 2.9 G/DL (ref 3.4–5)
ALP SERPL-CCNC: 95 U/L (ref 40–150)
ALT SERPL W P-5'-P-CCNC: 18 U/L (ref 0–70)
ANION GAP SERPL CALCULATED.3IONS-SCNC: 8 MMOL/L (ref 3–14)
AST SERPL W P-5'-P-CCNC: 12 U/L (ref 0–45)
BILIRUB SERPL-MCNC: 0.6 MG/DL (ref 0.2–1.3)
BUN SERPL-MCNC: 8 MG/DL (ref 7–30)
CALCIUM SERPL-MCNC: 8.7 MG/DL (ref 8.5–10.1)
CHLORIDE SERPL-SCNC: 103 MMOL/L (ref 94–109)
CO2 SERPL-SCNC: 26 MMOL/L (ref 20–32)
CREAT SERPL-MCNC: 0.72 MG/DL (ref 0.66–1.25)
CRP SERPL-MCNC: <2.9 MG/L (ref 0–8)
ERYTHROCYTE [DISTWIDTH] IN BLOOD BY AUTOMATED COUNT: 13.4 % (ref 10–15)
G LAMBLIA AG STL QL IA: NORMAL
GFR SERPL CREATININE-BSD FRML MDRD: >90 ML/MIN/1.7M2
GLUCOSE BLDC GLUCOMTR-MCNC: 169 MG/DL (ref 70–99)
GLUCOSE BLDC GLUCOMTR-MCNC: 218 MG/DL (ref 70–99)
GLUCOSE BLDC GLUCOMTR-MCNC: 254 MG/DL (ref 70–99)
GLUCOSE BLDC GLUCOMTR-MCNC: 394 MG/DL (ref 70–99)
GLUCOSE SERPL-MCNC: 281 MG/DL (ref 70–99)
HCT VFR BLD AUTO: 39.8 % (ref 40–53)
HGB BLD-MCNC: 13.3 G/DL (ref 13.3–17.7)
MCH RBC QN AUTO: 30.4 PG (ref 26.5–33)
MCHC RBC AUTO-ENTMCNC: 33.4 G/DL (ref 31.5–36.5)
MCV RBC AUTO: 91 FL (ref 78–100)
O+P STL MICRO: NORMAL
PLATELET # BLD AUTO: 225 10E9/L (ref 150–450)
POTASSIUM SERPL-SCNC: 3.7 MMOL/L (ref 3.4–5.3)
PROT SERPL-MCNC: 6.2 G/DL (ref 6.8–8.8)
RBC # BLD AUTO: 4.37 10E12/L (ref 4.4–5.9)
SODIUM SERPL-SCNC: 137 MMOL/L (ref 133–144)
SPECIMEN SOURCE: NORMAL
WBC # BLD AUTO: 6.2 10E9/L (ref 4–11)

## 2018-10-08 PROCEDURE — 25000132 ZZH RX MED GY IP 250 OP 250 PS 637: Performed by: INTERNAL MEDICINE

## 2018-10-08 PROCEDURE — 87209 SMEAR COMPLEX STAIN: CPT | Performed by: INTERNAL MEDICINE

## 2018-10-08 PROCEDURE — 80053 COMPREHEN METABOLIC PANEL: CPT | Performed by: INTERNAL MEDICINE

## 2018-10-08 PROCEDURE — 86140 C-REACTIVE PROTEIN: CPT | Performed by: INTERNAL MEDICINE

## 2018-10-08 PROCEDURE — 99233 SBSQ HOSP IP/OBS HIGH 50: CPT | Mod: GC | Performed by: INTERNAL MEDICINE

## 2018-10-08 PROCEDURE — 00000146 ZZHCL STATISTIC GLUCOSE BY METER IP

## 2018-10-08 PROCEDURE — 25000131 ZZH RX MED GY IP 250 OP 636 PS 637: Performed by: INTERNAL MEDICINE

## 2018-10-08 PROCEDURE — 36415 COLL VENOUS BLD VENIPUNCTURE: CPT | Performed by: INTERNAL MEDICINE

## 2018-10-08 PROCEDURE — 25000128 H RX IP 250 OP 636: Performed by: INTERNAL MEDICINE

## 2018-10-08 PROCEDURE — 12000001 ZZH R&B MED SURG/OB UMMC

## 2018-10-08 PROCEDURE — 87177 OVA AND PARASITES SMEARS: CPT | Performed by: INTERNAL MEDICINE

## 2018-10-08 PROCEDURE — 85027 COMPLETE CBC AUTOMATED: CPT | Performed by: INTERNAL MEDICINE

## 2018-10-08 RX ORDER — SIMETHICONE 80 MG
80 TABLET,CHEWABLE ORAL 4 TIMES DAILY
Status: DISCONTINUED | OUTPATIENT
Start: 2018-10-08 | End: 2018-10-10 | Stop reason: HOSPADM

## 2018-10-08 RX ORDER — GABAPENTIN 100 MG/1
200 CAPSULE ORAL 3 TIMES DAILY
Status: DISCONTINUED | OUTPATIENT
Start: 2018-10-08 | End: 2018-10-10 | Stop reason: HOSPADM

## 2018-10-08 RX ADMIN — SIMETHICONE CHEW TAB 80 MG 80 MG: 80 TABLET ORAL at 13:34

## 2018-10-08 RX ADMIN — INSULIN GLARGINE 10 UNITS: 100 INJECTION, SOLUTION SUBCUTANEOUS at 09:31

## 2018-10-08 RX ADMIN — ONDANSETRON 4 MG: 4 TABLET, ORALLY DISINTEGRATING ORAL at 09:29

## 2018-10-08 RX ADMIN — HYOSCYAMINE SULFATE 0.12 MG: 0.12 TABLET, ORALLY DISINTEGRATING ORAL at 20:06

## 2018-10-08 RX ADMIN — HYOSCYAMINE SULFATE 0.12 MG: 0.12 TABLET, ORALLY DISINTEGRATING ORAL at 13:34

## 2018-10-08 RX ADMIN — PANTOPRAZOLE SODIUM 40 MG: 40 TABLET, DELAYED RELEASE ORAL at 09:29

## 2018-10-08 RX ADMIN — INSULIN ASPART 1 UNITS: 100 INJECTION, SOLUTION INTRAVENOUS; SUBCUTANEOUS at 13:34

## 2018-10-08 RX ADMIN — MUPIROCIN: 20 OINTMENT TOPICAL at 13:34

## 2018-10-08 RX ADMIN — GABAPENTIN 200 MG: 100 CAPSULE ORAL at 20:06

## 2018-10-08 RX ADMIN — MUPIROCIN: 20 OINTMENT TOPICAL at 09:33

## 2018-10-08 RX ADMIN — DOXYCYCLINE HYCLATE 100 MG: 100 CAPSULE ORAL at 09:29

## 2018-10-08 RX ADMIN — INSULIN ASPART 2 UNITS: 100 INJECTION, SOLUTION INTRAVENOUS; SUBCUTANEOUS at 18:32

## 2018-10-08 RX ADMIN — POTASSIUM CHLORIDE 20 MEQ: 750 TABLET, EXTENDED RELEASE ORAL at 10:38

## 2018-10-08 RX ADMIN — HYOSCYAMINE SULFATE 0.12 MG: 0.12 TABLET, ORALLY DISINTEGRATING ORAL at 00:00

## 2018-10-08 RX ADMIN — MUPIROCIN: 20 OINTMENT TOPICAL at 20:05

## 2018-10-08 RX ADMIN — SIMETHICONE CHEW TAB 80 MG 80 MG: 80 TABLET ORAL at 20:05

## 2018-10-08 RX ADMIN — RANITIDINE 150 MG: 150 TABLET ORAL at 09:29

## 2018-10-08 RX ADMIN — MAGNESIUM OXIDE TAB 400 MG (241.3 MG ELEMENTAL MG) 400 MG: 400 (241.3 MG) TAB at 13:33

## 2018-10-08 RX ADMIN — SIMETHICONE CHEW TAB 80 MG 80 MG: 80 TABLET ORAL at 09:28

## 2018-10-08 RX ADMIN — HYOSCYAMINE SULFATE 0.12 MG: 0.12 TABLET, ORALLY DISINTEGRATING ORAL at 09:30

## 2018-10-08 RX ADMIN — SIMETHICONE CHEW TAB 80 MG 80 MG: 80 TABLET ORAL at 16:25

## 2018-10-08 RX ADMIN — PANTOPRAZOLE SODIUM 40 MG: 40 TABLET, DELAYED RELEASE ORAL at 16:25

## 2018-10-08 RX ADMIN — ONDANSETRON 4 MG: 4 TABLET, ORALLY DISINTEGRATING ORAL at 20:06

## 2018-10-08 RX ADMIN — INSULIN ASPART 2 UNITS: 100 INJECTION, SOLUTION INTRAVENOUS; SUBCUTANEOUS at 09:30

## 2018-10-08 RX ADMIN — GABAPENTIN 100 MG: 100 CAPSULE ORAL at 13:34

## 2018-10-08 RX ADMIN — DOXYCYCLINE HYCLATE 100 MG: 100 CAPSULE ORAL at 20:06

## 2018-10-08 RX ADMIN — RANITIDINE 150 MG: 150 TABLET ORAL at 20:06

## 2018-10-08 RX ADMIN — SODIUM CHLORIDE, POTASSIUM CHLORIDE, SODIUM LACTATE AND CALCIUM CHLORIDE: 600; 310; 30; 20 INJECTION, SOLUTION INTRAVENOUS at 03:28

## 2018-10-08 RX ADMIN — GABAPENTIN 100 MG: 100 CAPSULE ORAL at 09:30

## 2018-10-08 ASSESSMENT — ACTIVITIES OF DAILY LIVING (ADL)
ADLS_ACUITY_SCORE: 10

## 2018-10-08 NOTE — PROGRESS NOTES
GASTROENTEROLOGY PROGRESS NOTE       Patient Summary   Andrew Lockwood is a 50 year old male with a past medical history of HIV/AIDS on Biktarvy, DM type II on insulin, CNS toxoplasmosis, acute appendicitis s/p appendectomy on 1/31/18, SBO s/p lysis of adhesions, chronic abdominal pain and vomiting.        ASSESSMENT AND RECOMMENDATIONS:   #Acute on chronic abdominal pain  #[place] and vomiting   #Acute on chronic partial small bowel obstruction  #Hypokalemia   #Chronic constipation   Patient presented with midline abdominal pain, nausea and non-bloody emesis. On exam, abdomen is soft, active bowel sound in all quadrants but patient has tenderness in upper midline abdomen to palpation. On admission, CRP was 14, sed rate 23, WBC 10.4, lactic acid 1.1, and potassium was 3.3 but the rest of CMP and CBC are within normal limits. Abd/pelvic CT from 10/5/18 showed focal small bowel enteritis with associated wall thickening and narrowing small bowel dilation proximal to the inflammation concerning for partial small bowel obstruction. C. Diff, enteric pathogens, giardia, ova and parasite are negative.     Concern for SB obstructions prior to any surgery starting in 2016. Imaging until 4/2018 only revealed areas of bowel dilation/distention. Transition points not seen until patient had undergone a few surgeries. Notably ex lap done after one of these scans these did not reveal a transition point.    Chronic constipation that has been present since he was a child concerns for dysmotility issues versus diet related. Bloating and ongoing abdominal pain relieved with flatus in the setting of poorly controlled diabetes concerns for small bowel bacterial overgrowth. Intermittent enteritis with dilation in setting of multiple surgeries and appendicitis with post-op abscess and adhesive disease, concern is adhesive disease versus ileus.    Thus far no evidence to suggest Crohn's disease (negative EGD and colonoscopy, negative ex lap  with running of the bowel x 2, appendix pathology was consistent with acute appendicitis). CRP <2.9, and WBC is 6.2. Albumin 2.9, protein 6.2 and creatinine 0.72, patient may have dietary contribution as his meals are not available in regular basis. May be useful to further assess small bowel for intraluminal lesions- not ideal capsule candidate (?patency capsule), though would like target if pursuing deep bowel enteroscopy. Will review imaging with radiology. In the meantime, plan is to continue with medical management including relieving constipation, optimizing electrolytes, and scheduled antiemetics.         Recommendations  --Advance diet as tolerated   --Consider treating SIBO with oral Rifaximin x10 days (550mg TID)  --Continue with aggressive bowel regimen, and if patient cannot tolerate Miralax, recommend Mg oxide 400-500 mg daily as osmotic laxative, titrate to effect up to 1000 mg daily  --NG tube placement to LIS suction if emesis not controlled with antiemetics   --40mg of IV Protonix twice daily for possible gastritis seen on CT   --Avoid narcotics as it can worsen obstruction, nausea and vomiting   --Scheduled antiemetics and prn   --Monitor CRP, CMP and CBC  --Optimize electrolytes (magnesium 2, potassium 4, phos 3.5, and calcium of 8)  --Avoid NSAIDs   --Consider Bentyl/Hyosycamine and gabapentin for pain    Pt care plan discussed with Dr. Molina, GI staff physician. Thank you for involving us in this patient's care. Please do not hesitate to contact the GI service with any questions or concerns.    VIKASH Gamboa [facility]  Department of Gastroenterology   P: [phone]  Subjective\events\changes within the 24 hours:   Patient reports improvement of his abdominal pain and has tolerated full liquid last night. He hungry this morning. However, he says that his pain usually gets worse once he starts eating solid foods. He is having liquid stools without any blood.     4 point ROS performed and  negative unless noted above.           Medications:     Current Facility-Administered Medications   Medication     bictegravir-emtricitabine-tenofovir (BIKTARVY) -25 MG per tablet 1 tablet     calcium carbonate (TUMS) chewable tablet 500 mg     glucose gel 15-30 g    Or     dextrose 50 % injection 25-50 mL    Or     glucagon injection 1 mg     doxycycline (VIBRAMYCIN) capsule 100 mg     gabapentin (NEURONTIN) capsule 200 mg     glycerin (adult) Suppository 1 suppository     hydrOXYzine (ATARAX) tablet 25 mg     hyoscyamine ODT tablet 0.125 mg     hyoscyamine ODT tablet 0.125 mg     insulin aspart (NovoLOG) inj (RAPID ACTING)     insulin aspart (NovoLOG) inj (RAPID ACTING)     insulin glargine (LANTUS) injection 10 Units     lidocaine (LMX4) cream     lidocaine 1 % 1 mL     magnesium oxide (MAG-OX) tablet 400 mg     magnesium sulfate 2 g in NS intermittent infusion (PharMEDium or FV Cmpd)     magnesium sulfate 4 g in 100 mL sterile water (premade)     mupirocin (BACTROBAN) 2 % ointment     ondansetron (ZOFRAN-ODT) ODT tab 4 mg     pantoprazole (PROTONIX) EC tablet 40 mg     potassium chloride (KLOR-CON) Packet 20-40 mEq     potassium chloride 10 mEq in 100 mL intermittent infusion with 10 mg lidocaine     potassium chloride 10 mEq in 100 mL sterile water intermittent infusion (premix)     potassium chloride 20 mEq in 50 mL intermittent infusion     potassium chloride SA (K-DUR/KLOR-CON M) CR tablet 20-40 mEq     potassium phosphate 10 mmol in D5W 250 mL intermittent infusion     potassium phosphate 15 mmol in D5W 250 mL intermittent infusion     potassium phosphate 20 mmol in D5W 250 mL intermittent infusion     potassium phosphate 20 mmol in D5W 500 mL intermittent infusion     potassium phosphate 25 mmol in D5W 500 mL intermittent infusion     prochlorperazine (COMPAZINE) injection 10 mg    Or     prochlorperazine (COMPAZINE) tablet 10 mg    Or     prochlorperazine (COMPAZINE) Suppository 25 mg     ranitidine  (ZANTAC) tablet 150 mg     simethicone (MYLICON) chewable tablet 80 mg     sodium chloride (PF) 0.9% PF flush 3 mL     sodium chloride (PF) 0.9% PF flush 3 mL        Physical Exam   Blood pressure 110/71, pulse 87, temperature 98.4  F (36.9  C), temperature source Oral, resp. rate 18, weight 80.7 kg (178 lb), SpO2 97 %.  Constitutional: cooperative, pleasant, not dyspneic/diaphoretic, no acute distress  Eyes: Sclera anicteric/injected  Ears/nose/mouth/throat: Normal oropharynx without ulcers or exudate, mucus membranes moist, hearing intact  Neck: supple, thyroid normal size  CV: RRR. No edema in LE bilaterally   Respiratory: Unlabored breathing. CTA bilaterally   Lymph: No axillary, submandibular, supraclavicular or inguinal lymphadenopathy  Abd: Nondistended, +bs, no hepatosplenomegaly, no peritoneal signs. Tenderness in midline from epigastric to umbilical   Skin: warm, perfused, no jaundice  Neuro: AAO x 3, No asterixis  Psych: Normal affect  MSK: Normal gait    Data   Current Labs  CBC  Recent Labs  Lab 10/08/18  0720 10/07/18  0813 10/06/18  0658 10/05/18  0818   WBC 6.2 5.4 6.3 10.4   RBC 4.37* 4.46 4.40 4.98   HGB 13.3 13.6 13.3 15.7   HCT 39.8* 41.5 40.3 44.7   MCV 91 93 92 90   MCH 30.4 30.5 30.2 31.5   MCHC 33.4 32.8 33.0 35.1   RDW 13.4 13.7 13.7 13.8    231 229 282     BMP  Recent Labs  Lab 10/08/18  0720 10/07/18  0813 10/06/18  0658 10/05/18  0818    138 137 138   POTASSIUM 3.7 3.9 3.9 3.3*   CHLORIDE 103 106 106 104   CO2 26 25 25 24   ANIONGAP 8 8 6 10   * 183* 172* 225*   BUN 8 11 12 13   CR 0.72 0.72 0.78 0.81   GFRESTIMATED >90 >90 >90 >90   GFRESTBLACK >90 >90 >90 >90   LINDA 8.7 8.6 8.0* 9.6   MAG  --   --  2.1  --    PHOS  --   --  2.8 3.9      INRNo lab results found in last 7 days.  Liver panel  Recent Labs  Lab 10/08/18  0720 10/07/18  0813 10/06/18  0658 10/05/18  0818   PROTTOTAL 6.2* 6.6* 6.2* 8.0   ALBUMIN 2.9* 2.9* 2.8* 3.8   BILITOTAL 0.6 0.8 0.7 0.7   ALKPHOS 95 104  100 144   AST 12 10 13 15   ALT 18 18 19 25          History of Present Illness:   Andrew Lockwood is a 50 year old male with a history of HIV/AIDS, DM type II on insulin, CNS toxoplasmosis, acute appendicitis s/p appendectomy on 1/31/18, SBO s/p lysis of adhesions, chronic abdominal pain and vomiting.       Around 11pm on 10/4/18 patient developed mild abdominal pain and around 3am on 10/5/18 pain suddenly became severe. Pain is in epigastric to the umbilical, in midline. The last flatus was around 6am to 7am, and last bowel movement was 6pm last night. He has been vomiting since 4am on 10/5/18. He denies fevers, chills, hematemesis, hematochezia, melena, oral sores, odynophagia and dysphagia. He was recently admitted from 9/10-9/13 for partial SBO, which was medically managed following surgical consultation. He was scheduled to see GI as an outpatient for evaluation of recurrent SBO for possible balloon enteroscopy, though he was unable to make this appointment due to homelessness and difficulty with transportation. He has a sister with Crohn's Disease who lives in [place]. He denies travel out of the [place] in over a decade and he lives at [facility] (not sure if anyone is sick at the shelter). He has a hx of HIV/AIDS, followed in ID clinic, with <20 HIV RNA and an absolute CD4 count of 317. He currently is treated with Biktarvy and notes compliance. He was seen in ID clinic recently for a possible abscess of the right arm, which did not need drainage. He was prescribed doxycycline but was unable to obtain this medication. He reports that he was able to open this wound, with some purulent drainage.     Imaging completed for abdominal pain:  1/3/16 CT showed questionable bowel obstruction  7/21/16 CT showed low-grade small bowel obstruction in mid left abdomen  7/24/CT showed persistent dilated small bowel in mid abdomen.  Patient had exploratory lap yelitza for SBO versus ileus with negative exploration  colonoscopy.  8/25/16 CT showed stable focal dilated loop of small bowel and severe gastric distention  11/6/17 CT showed circumferential thickening of the pylorus and single dilated loop of small bowel concerning for partial obstruction.  Patient had EGD and colonoscopy that did not show evidence of IBD.  1/31/18 abdominal CT showed acute appendicitis s/p appendectomy  2/4/18 CT showed abscess in the right lower quadrant s/p right percutaneous drain placement  2/10/18 abdominal CT showed right percutaneous drain with decreased size of abdominal abscess  4/16/18 abdominal CT showed small bowel obstruction with 2 possible transition point with potential close loops of of obstruction s/p exploratory laparoscopy with lysis of addition  4/20/18 abdominal CTs showed postsurgical changes and resolution of small bowel obstruction  7/4/18 abdominal CT showed small bowel obstruction with transition point in the right lower quadrant  9/10/18 abdominal CT showed loops of mildly dilated small bowel in the central abdomen and gentle transition point in the right mid abdomen  9/27/18 there was no bowel obstruction seen on CT but there was a gastric wall thickening concerning for gastritis.

## 2018-10-08 NOTE — PROGRESS NOTES
[facility], [place]    Internal Medicine Progress Note - [facility] Service    Assessment & Plan   Andrew Lockwood is a 50 year old male admitted on 10/5/2018. He has a history of IDDM, HIV on ART (CD4 > 300 in August, w VLUD), recent SBO with lysis of adhesions.    # Abdominal pain  # PO intolerance  Ddx remains broad, especially with non-specific sx, small bowel thickening seen on CT, possible immunosuppression. Notable studies as below. Based on data so far, suspect ileus or functional pain associated with recent surgery, infectious enteritis or, less likely, IBD. Evaluation for infections has been undertaken as below. He is clinically improving with normalized CRP.  - GI following, appreciate assistance, discussed case with team, who will review prior images with radiology before providing further recs  - may require specialized endoscopy for further evaluation per GI notes, if needed unclear if it will be inpatient vs outpatient  - Normal/negative studies: Lipase, serum CMV PCR (not detected), stool enteric panel, CDiff, Giardia Ag  - Fecal lactoferrin positive  - Hold miralax, which he is refusing  - Contiune PPI, hyoscyamine, gabapentin (dose increase 10/8)  - Avoid opioids, as they will contribute to dysmotility  - MgOx for bowel regimen  - simethicone scheduled  - consider rifaximin for SIBO    #SSI of the right forearm  Bug bite that turned into skin and soft tissue infection with induration and history of purulence. No evidence of abscess, as it is draining. No systemic symptoms. Swabbed in clinic, growing MSSA. Responding to treatment.  - doxycycline x 7 days (d1 - 10/6)  - topical mupirocin TID x 5 days      #HIV  Followed in ID Clinic. Last CD4 317, HIV <20. On treatment.  - continue home bictegravir-emtricitabine-tenofovir daily      #insulin dependent DM  Last hgb A1c 8.7. Glucoses within goal with significantly reduced dose from home.  - glucose AC/HS  - reduced long acting  insulin, low intensity SSI  - hypoglycemia protocol  - hold home empagliflozin    Diet: Advance to regular diet  Fluids: none  Potts Catheter: not present  DVT Prophylaxis: Ambulate every shift  Code Status: Full Code  Dispo: likely today or tomorrow, pending need for further inpatient GI workup, tolerating diet, tolerable pain.    Discussed with attending, Dr. Quiroz.    Mario Bone MD  Internal Medicine Staff Hospitalist Service  [facility]  Please see sticky note for cross cover information    Interval History   - no acute events  - abdominal pain still present, but mostly with eating, seems to be gas-like pain  - denies fevers, chills, N/V  - wound on right arm improving    Data reviewed today: I reviewed all medications, new labs and imaging results over the last 24 hours.    Physical Exam   Vital Signs: Temp: 96.6  F (35.9  C) Temp src: Oral BP: 116/71   Heart Rate: 71 Resp: 20 SpO2: 100 % O2 Device: None (Room air)    Weight: 172 lbs 3.2 oz  Lying flat in bed in NAD  CTAB, no w/r/r  RRR, no m/g/r  Soft, non tender, no rigidity/guarding, BS+  Healing wound on right forearm with resolving erythema, no drainage  Alert, no focal deficits    ATTENDING ATTESTATION  Patient seen, examined and discussed with resident(s) or advanced practice provider. I agree with the key elements of findings and plan described above, with edits made to note above as needed.    Inderjit Quiroz MD  [facility]

## 2018-10-08 NOTE — PLAN OF CARE
Problem: Patient Care Overview  Goal: Plan of Care/Patient Progress Review  Pt was frustrated that this writer woke him up at 0930 for am med. Cracker packages and a bowl of old oatmeal sitting at bedside table. Pt reported eating oatmeal over night with no increased in abd pain. Medicine team was notified of pt's request for a regular diet. Will assess pain after pt is done eating.

## 2018-10-08 NOTE — PLAN OF CARE
Problem: Patient Care Overview  Goal: Plan of Care/Patient Progress Review  Outcome: No Change  Pt A/Ox4, VSS and C/O abd discomfort. Hyoscyamine utilized, minor relif. Pt tolerating clear liquid diet well. Had shower overnight. R PIV currently infusing LR at 100/hr for for PO intake per note. RUE bug bite scabbed over, pt instructed not to pick at it. Pt currently on PO doxycycline for 7 days. Stool samples collected and sent. All are neg thus far, ova and parasite pending. Plan is to modify pts diet and continue to determine source of abd pain. Possible discontinue today to shelter if pt medically stable, pt stated he would like to go home today. Will continue to monitor pt and follow POC.

## 2018-10-08 NOTE — PLAN OF CARE
Problem: Patient Care Overview  Goal: Plan of Care/Patient Progress Review  Pt ambulated off the unit frequently. Reported 2 BM this shift, stool sent. C/o abd cramps, simethicone given per scheduled. Tolerated regular diet well. K 3.7 this am, 20 mEq given. Next K level will be drawn tomorrow am.

## 2018-10-08 NOTE — PLAN OF CARE
Problem: Patient Care Overview  Goal: Plan of Care/Patient Progress Review  Outcome: Improving  AVSS, on RA. Up independent. Denies pain/nausea. Good oral intake.Tolerating clear liquids.Voiding. MIVF at 100cc/hr. Calls appropriately.

## 2018-10-09 LAB
GLUCOSE BLDC GLUCOMTR-MCNC: 279 MG/DL (ref 70–99)
GLUCOSE BLDC GLUCOMTR-MCNC: 320 MG/DL (ref 70–99)
GLUCOSE BLDC GLUCOMTR-MCNC: 350 MG/DL (ref 70–99)
GLUCOSE BLDC GLUCOMTR-MCNC: 354 MG/DL (ref 70–99)
GLUCOSE BLDC GLUCOMTR-MCNC: 380 MG/DL (ref 70–99)
GLUCOSE BLDC GLUCOMTR-MCNC: 398 MG/DL (ref 70–99)
GLUCOSE BLDC GLUCOMTR-MCNC: 424 MG/DL (ref 70–99)
GLUCOSE BLDC GLUCOMTR-MCNC: 465 MG/DL (ref 70–99)
O+P STL MICRO: NORMAL
O+P STL MICRO: NORMAL
SPECIMEN SOURCE: NORMAL

## 2018-10-09 PROCEDURE — 25000132 ZZH RX MED GY IP 250 OP 250 PS 637: Performed by: NURSE PRACTITIONER

## 2018-10-09 PROCEDURE — 25000132 ZZH RX MED GY IP 250 OP 250 PS 637: Performed by: INTERNAL MEDICINE

## 2018-10-09 PROCEDURE — 12000001 ZZH R&B MED SURG/OB UMMC

## 2018-10-09 PROCEDURE — 00000146 ZZHCL STATISTIC GLUCOSE BY METER IP

## 2018-10-09 PROCEDURE — 25000131 ZZH RX MED GY IP 250 OP 636 PS 637: Performed by: INTERNAL MEDICINE

## 2018-10-09 PROCEDURE — 99233 SBSQ HOSP IP/OBS HIGH 50: CPT | Mod: GC | Performed by: INTERNAL MEDICINE

## 2018-10-09 RX ORDER — HYOSCYAMINE SULFATE 0.125 MG
0.25 TABLET,DISINTEGRATING ORAL 4 TIMES DAILY PRN
Qty: 60 TABLET | Refills: 0 | Status: SHIPPED | OUTPATIENT
Start: 2018-10-09 | End: 2019-04-15

## 2018-10-09 RX ORDER — DOXYCYCLINE 100 MG/1
100 CAPSULE ORAL 2 TIMES DAILY
Qty: 7 CAPSULE | Refills: 0 | Status: SHIPPED | OUTPATIENT
Start: 2018-10-09 | End: 2019-03-08

## 2018-10-09 RX ORDER — ONDANSETRON 4 MG/1
4 TABLET, ORALLY DISINTEGRATING ORAL 2 TIMES DAILY
Qty: 6 TABLET | Refills: 0 | Status: SHIPPED | OUTPATIENT
Start: 2018-10-09 | End: 2019-03-08

## 2018-10-09 RX ORDER — CALCIUM CARBONATE 500 MG/1
2 TABLET, CHEWABLE ORAL 3 TIMES DAILY PRN
Qty: 150 TABLET | Refills: 0 | Status: SHIPPED | OUTPATIENT
Start: 2018-10-09 | End: 2018-10-23

## 2018-10-09 RX ORDER — POLYETHYLENE GLYCOL 3350 17 G/17G
17 POWDER, FOR SOLUTION ORAL 3 TIMES DAILY
Status: DISCONTINUED | OUTPATIENT
Start: 2018-10-09 | End: 2018-10-10 | Stop reason: HOSPADM

## 2018-10-09 RX ORDER — MAGNESIUM OXIDE 400 MG/1
400 TABLET ORAL DAILY
Qty: 30 TABLET | Refills: 0 | Status: SHIPPED | OUTPATIENT
Start: 2018-10-09 | End: 2019-04-15

## 2018-10-09 RX ORDER — MUPIROCIN 20 MG/G
OINTMENT TOPICAL 3 TIMES DAILY
Qty: 22 G | Refills: 0 | Status: SHIPPED | OUTPATIENT
Start: 2018-10-09 | End: 2019-03-08

## 2018-10-09 RX ORDER — GABAPENTIN 100 MG/1
200 CAPSULE ORAL 3 TIMES DAILY
Qty: 90 CAPSULE | Refills: 0 | Status: SHIPPED | OUTPATIENT
Start: 2018-10-09 | End: 2019-04-15

## 2018-10-09 RX ORDER — SIMETHICONE 80 MG
80 TABLET,CHEWABLE ORAL 4 TIMES DAILY
Qty: 30 TABLET | Refills: 0 | Status: SHIPPED | OUTPATIENT
Start: 2018-10-09 | End: 2019-04-15

## 2018-10-09 RX ADMIN — DOXYCYCLINE HYCLATE 100 MG: 100 CAPSULE ORAL at 20:41

## 2018-10-09 RX ADMIN — RIFAXIMIN 550 MG: 550 TABLET ORAL at 13:10

## 2018-10-09 RX ADMIN — POTASSIUM CHLORIDE 20 MEQ: 750 TABLET, EXTENDED RELEASE ORAL at 20:41

## 2018-10-09 RX ADMIN — ONDANSETRON 4 MG: 4 TABLET, ORALLY DISINTEGRATING ORAL at 08:16

## 2018-10-09 RX ADMIN — RIFAXIMIN 550 MG: 550 TABLET ORAL at 20:41

## 2018-10-09 RX ADMIN — SIMETHICONE CHEW TAB 80 MG 80 MG: 80 TABLET ORAL at 20:46

## 2018-10-09 RX ADMIN — MUPIROCIN: 20 OINTMENT TOPICAL at 13:09

## 2018-10-09 RX ADMIN — HYOSCYAMINE SULFATE 0.12 MG: 0.12 TABLET, ORALLY DISINTEGRATING ORAL at 02:13

## 2018-10-09 RX ADMIN — INSULIN ASPART 4 UNITS: 100 INJECTION, SOLUTION INTRAVENOUS; SUBCUTANEOUS at 13:07

## 2018-10-09 RX ADMIN — MUPIROCIN: 20 OINTMENT TOPICAL at 08:16

## 2018-10-09 RX ADMIN — PANTOPRAZOLE SODIUM 40 MG: 40 TABLET, DELAYED RELEASE ORAL at 17:29

## 2018-10-09 RX ADMIN — HYOSCYAMINE SULFATE 0.12 MG: 0.12 TABLET, ORALLY DISINTEGRATING ORAL at 08:16

## 2018-10-09 RX ADMIN — POLYETHYLENE GLYCOL 3350 17 G: 17 POWDER, FOR SOLUTION ORAL at 10:25

## 2018-10-09 RX ADMIN — SIMETHICONE CHEW TAB 80 MG 80 MG: 80 TABLET ORAL at 13:10

## 2018-10-09 RX ADMIN — GABAPENTIN 200 MG: 100 CAPSULE ORAL at 13:10

## 2018-10-09 RX ADMIN — POLYETHYLENE GLYCOL 3350 17 G: 17 POWDER, FOR SOLUTION ORAL at 17:30

## 2018-10-09 RX ADMIN — POLYETHYLENE GLYCOL 3350 17 G: 17 POWDER, FOR SOLUTION ORAL at 20:42

## 2018-10-09 RX ADMIN — HYOSCYAMINE SULFATE 0.12 MG: 0.12 TABLET, ORALLY DISINTEGRATING ORAL at 20:42

## 2018-10-09 RX ADMIN — INSULIN ASPART 3 UNITS: 100 INJECTION, SOLUTION INTRAVENOUS; SUBCUTANEOUS at 10:25

## 2018-10-09 RX ADMIN — DOXYCYCLINE HYCLATE 100 MG: 100 CAPSULE ORAL at 08:15

## 2018-10-09 RX ADMIN — RIFAXIMIN 550 MG: 550 TABLET ORAL at 10:25

## 2018-10-09 RX ADMIN — RANITIDINE 150 MG: 150 TABLET ORAL at 20:42

## 2018-10-09 RX ADMIN — MUPIROCIN: 20 OINTMENT TOPICAL at 20:45

## 2018-10-09 RX ADMIN — HYOSCYAMINE SULFATE 0.12 MG: 0.12 TABLET, ORALLY DISINTEGRATING ORAL at 13:10

## 2018-10-09 RX ADMIN — GABAPENTIN 200 MG: 100 CAPSULE ORAL at 20:41

## 2018-10-09 RX ADMIN — MAGNESIUM OXIDE TAB 400 MG (241.3 MG ELEMENTAL MG) 400 MG: 400 (241.3 MG) TAB at 13:10

## 2018-10-09 RX ADMIN — SIMETHICONE CHEW TAB 80 MG 80 MG: 80 TABLET ORAL at 08:15

## 2018-10-09 RX ADMIN — PANTOPRAZOLE SODIUM 40 MG: 40 TABLET, DELAYED RELEASE ORAL at 08:15

## 2018-10-09 RX ADMIN — GABAPENTIN 200 MG: 100 CAPSULE ORAL at 08:15

## 2018-10-09 RX ADMIN — INSULIN ASPART 6 UNITS: 100 INJECTION, SOLUTION INTRAVENOUS; SUBCUTANEOUS at 17:32

## 2018-10-09 RX ADMIN — RANITIDINE 150 MG: 150 TABLET ORAL at 08:15

## 2018-10-09 RX ADMIN — ONDANSETRON 4 MG: 4 TABLET, ORALLY DISINTEGRATING ORAL at 20:44

## 2018-10-09 RX ADMIN — SIMETHICONE CHEW TAB 80 MG 80 MG: 80 TABLET ORAL at 17:29

## 2018-10-09 ASSESSMENT — ACTIVITIES OF DAILY LIVING (ADL)
ADLS_ACUITY_SCORE: 10

## 2018-10-09 NOTE — PLAN OF CARE
Problem: Patient Care Overview  Goal: Plan of Care/Patient Progress Review  Pt was not in room for this writer to give Novolog after breakfast.

## 2018-10-09 NOTE — PLAN OF CARE
"Problem: Patient Care Overview  Goal: Plan of Care/Patient Progress Review   before lunch when pt came back to room after ambulating outside, multiple cracker wrappers noted on bedside table. Pt is independent on his feet and take multiple trips outside of room, unable to keep tract of what and when pt eats. Pt was advised to remain in room while being in the hospital. He responded: \"The doctors want me to walk!\". VSS, denied any pain, able to make needs known.       "

## 2018-10-09 NOTE — PROVIDER NOTIFICATION
Pt 0200 BG check was 398- Crosscover notified. One time dose 2 unit Novolog given. Will recheck BG in am unless becomes symptomatic.

## 2018-10-09 NOTE — PLAN OF CARE
Problem: Patient Care Overview  Goal: Plan of Care/Patient Progress Review  Outcome: Improving  Time: 9320-6395      Reason for admission: SBO   Vitals: VSS on RA  Activity: Up independently, taking frequent walks outside  Pain: Pt c/o abd cramping intermittently- PRN hyoscyamine given with some relief  Neuro:  A&Ox4, calls appropriately  Cardiac: WNL  Respiratory: WNL  GI/: No BM this shift. Voiding without difficulty   Diet:  Regular diet  Lines: PIV SL, patent  Labs: K replaced yesterday, recheck this morning. Bedtime and 0200 BG check in high 300s- extra dose insulin given at 0200.       Plan: Possible discharge to shelter today       Continue to monitor and follow POC

## 2018-10-09 NOTE — PROGRESS NOTES
GASTROENTEROLOGY PROGRESS NOTE       Patient Summary   Andrew Lockwood is a 50 year old male with a past medical history of HIV/AIDS on Biktarvy, DM type II on insulin, CNS toxoplasmosis, acute appendicitis s/p appendectomy on 1/31/18, SBO s/p lysis of adhesions, chronic abdominal pain and vomiting.        ASSESSMENT AND RECOMMENDATIONS:   #Acute on chronic abdominal pain  #[place] and vomiting   #Acute on chronic partial small bowel obstruction  #Hypokalemia   #Chronic constipation   Patient presented with midline abdominal pain, nausea and non-bloody emesis. On exam, abdomen is soft, active bowel sound in all quadrants but patient has tenderness in upper midline abdomen to palpation. On admission, CRP was 14, sed rate 23, WBC 10.4, lactic acid 1.1, and potassium was 3.3 but the rest of CMP and CBC are within normal limits. Abd/pelvic CT from 10/5/18 showed focal small bowel enteritis with associated wall thickening and narrowing small bowel dilation proximal to the inflammation concerning for partial small bowel obstruction. C. Diff, enteric pathogens, giardia, ova and parasite are negative.     Concern for SB obstructions prior to any surgery starting in 2016. Imaging until 4/2018 only revealed areas of bowel dilation/distention. Transition points not seen until patient had undergone a few surgeries. Notably ex lap done after one of these scans these did not reveal a transition point.    Chronic constipation that has been present since he was a child concerns for dysmotility issues versus diet related. Bloating and ongoing abdominal pain relieved with flatus in the setting of poorly controlled diabetes concerns for small bowel bacterial overgrowth. Intermittent enteritis with dilation in setting of multiple surgeries and appendicitis with post-op abscess and adhesive disease, concern is adhesive disease versus ileus.    Thus far no evidence to suggest Crohn's disease (negative EGD and colonoscopy, negative ex lap  with running of the bowel x 2, appendix pathology was consistent with acute appendicitis). CRP <2.9, and WBC is 6.2. Albumin 2.9, protein 6.2 and creatinine 0.72, patient may have dietary contribution as his meals are not available in regular basis.     Dr. Molina has reviewed images with radiology and the enteritis versus stricture was determined to be unreachable with push enteroscopy. However, there was no SBO identified as there was no transition point. The options include getting capsule for target intraluminal lesion, but then there is the risk of capsule endoscopy getting stuck in the area of stricture/entritis that may require surgical removal. Another question is the utility of laparoscopic guided endoscopy versus single/double balloon enteroscopy. All these questions will be discussed with the multidisciplinary team on Thursday 10/25/18 at GI luminal/IBD care conference. In the meantime, plan is to continue with medical management including relieving constipation, optimizing electrolytes, and scheduled antiemetics.       Recommendations  --Advance diet as tolerated   --Continue treating SIBO with oral Rifaximin x10 days (550mg TID)  --Continue with aggressive bowel regimen, and if patient cannot tolerate Miralax, recommend Mg oxide 400-500 mg daily as osmotic laxative, titrate to effect up to 1000 mg daily  --40mg of IV Protonix twice daily for possible gastritis seen on CT   --Avoid narcotics as it can worsen obstruction, nausea and vomiting   --Avoid NSAIDs   --Consider Bentyl/Hyosycamine and gabapentin for pain  --Consult with  to help transportation for future follow up appointments    --Present patient to the multidisciplinary team at the GI luminal care conference to decide best approach to the enteritis noted on CT     Pt care plan discussed with Dr. Molina, GI staff physician. Thank you for involving us in this patient's care. Please do not hesitate to contact the GI service with  any questions or concerns.    VIKASH Gamboa [facility]  Department of Gastroenterology   P: [phone]  Subjective\events\changes within the 24 hours:   Patient had abdominal cramps last night after eating dinner but he is passing gas and no n/v.     4 point ROS performed and negative unless noted above.           Medications:     Current Facility-Administered Medications   Medication     bictegravir-emtricitabine-tenofovir (BIKTARVY) -25 MG per tablet 1 tablet     calcium carbonate (TUMS) chewable tablet 500 mg     glucose gel 15-30 g    Or     dextrose 50 % injection 25-50 mL    Or     glucagon injection 1 mg     doxycycline (VIBRAMYCIN) capsule 100 mg     gabapentin (NEURONTIN) capsule 200 mg     glycerin (adult) Suppository 1 suppository     hydrOXYzine (ATARAX) tablet 25 mg     hyoscyamine ODT tablet 0.125 mg     hyoscyamine ODT tablet 0.125 mg     insulin aspart (NovoLOG) inj (RAPID ACTING)     insulin aspart (NovoLOG) inj (RAPID ACTING)     insulin glargine (LANTUS) injection 12 Units     lidocaine (LMX4) cream     lidocaine 1 % 1 mL     magnesium oxide (MAG-OX) tablet 400 mg     magnesium sulfate 2 g in NS intermittent infusion (PharMEDium or [facility] Cmpd)     magnesium sulfate 4 g in 100 mL sterile water (premade)     mupirocin (BACTROBAN) 2 % ointment     ondansetron (ZOFRAN-ODT) ODT tab 4 mg     pantoprazole (PROTONIX) EC tablet 40 mg     polyethylene glycol (MIRALAX/GLYCOLAX) Packet 17 g     potassium chloride (KLOR-CON) Packet 20-40 mEq     potassium chloride 10 mEq in 100 mL intermittent infusion with 10 mg lidocaine     potassium chloride 10 mEq in 100 mL sterile water intermittent infusion (premix)     potassium chloride 20 mEq in 50 mL intermittent infusion     potassium chloride SA (K-DUR/KLOR-CON M) CR tablet 20-40 mEq     potassium phosphate 10 mmol in D5W 250 mL intermittent infusion     potassium phosphate 15 mmol in D5W 250 mL intermittent infusion     potassium phosphate 20 mmol in D5W  250 mL intermittent infusion     potassium phosphate 20 mmol in D5W 500 mL intermittent infusion     potassium phosphate 25 mmol in D5W 500 mL intermittent infusion     prochlorperazine (COMPAZINE) injection 10 mg    Or     prochlorperazine (COMPAZINE) tablet 10 mg    Or     prochlorperazine (COMPAZINE) Suppository 25 mg     ranitidine (ZANTAC) tablet 150 mg     rifaximin (XIFAXAN) tablet 550 mg     simethicone (MYLICON) chewable tablet 80 mg     sodium chloride (PF) 0.9% PF flush 3 mL     sodium chloride (PF) 0.9% PF flush 3 mL        Physical Exam   Blood pressure 119/73, pulse 92, temperature 97.6  F (36.4  C), temperature source Oral, resp. rate 18, weight 78 kg (172 lb), SpO2 97 %.  Constitutional: cooperative, pleasant, not dyspneic/diaphoretic, no acute distress  Eyes: Sclera anicteric/injected  Ears/nose/mouth/throat: Normal oropharynx without ulcers or exudate, mucus membranes moist, hearing intact  Neck: supple, thyroid normal size  CV: RRR. No edema in LE bilaterally   Respiratory: Unlabored breathing. CTA bilaterally   Lymph: No axillary, submandibular, supraclavicular or inguinal lymphadenopathy  Abd: Nondistended, +bs, no hepatosplenomegaly, no peritoneal signs. Tenderness in midline from epigastric to umbilical   Skin: warm, perfused, no jaundice  Neuro: AAO x 3, No asterixis  Psych: Normal affect  MSK: Normal gait    Data   Current Labs  CBC    Recent Labs  Lab 10/08/18  0720 10/07/18  0813 10/06/18  0658 10/05/18  0818   WBC 6.2 5.4 6.3 10.4   RBC 4.37* 4.46 4.40 4.98   HGB 13.3 13.6 13.3 15.7   HCT 39.8* 41.5 40.3 44.7   MCV 91 93 92 90   MCH 30.4 30.5 30.2 31.5   MCHC 33.4 32.8 33.0 35.1   RDW 13.4 13.7 13.7 13.8    231 229 282     BMP    Recent Labs  Lab 10/08/18  0720 10/07/18  0813 10/06/18  0658 10/05/18  0818    138 137 138   POTASSIUM 3.7 3.9 3.9 3.3*   CHLORIDE 103 106 106 104   CO2 26 25 25 24   ANIONGAP 8 8 6 10   * 183* 172* 225*   BUN 8 11 12 13   CR 0.72 0.72 0.78  0.81   GFRESTIMATED >90 >90 >90 >90   GFRESTBLACK >90 >90 >90 >90   LINDA 8.7 8.6 8.0* 9.6   MAG  --   --  2.1  --    PHOS  --   --  2.8 3.9      INRNo lab results found in last 7 days.  Liver panel    Recent Labs  Lab 10/08/18  0720 10/07/18  0813 10/06/18  0658 10/05/18  0818   PROTTOTAL 6.2* 6.6* 6.2* 8.0   ALBUMIN 2.9* 2.9* 2.8* 3.8   BILITOTAL 0.6 0.8 0.7 0.7   ALKPHOS 95 104 100 144   AST 12 10 13 15   ALT 18 18 19 25          History of Present Illness:   Andrew Lockwood is a 50 year old male with a history of HIV/AIDS, DM type II on insulin, CNS toxoplasmosis, acute appendicitis s/p appendectomy on 1/31/18, SBO s/p lysis of adhesions, chronic abdominal pain and vomiting.       Around 11pm on 10/4/18 patient developed mild abdominal pain and around 3am on 10/5/18 pain suddenly became severe. Pain is in epigastric to the umbilical, in midline. The last flatus was around 6am to 7am, and last bowel movement was 6pm last night. He has been vomiting since 4am on 10/5/18. He denies fevers, chills, hematemesis, hematochezia, melena, oral sores, odynophagia and dysphagia. He was recently admitted from 9/10-9/13 for partial SBO, which was medically managed following surgical consultation. He was scheduled to see GI as an outpatient for evaluation of recurrent SBO for possible balloon enteroscopy, though he was unable to make this appointment due to homelessness and difficulty with transportation. He has a sister with Crohn's Disease who lives in [place]. He denies travel out of the [place] in over a decade and he lives at [facility] (not sure if anyone is sick at the shelter). He has a hx of HIV/AIDS, followed in ID clinic, with <20 HIV RNA and an absolute CD4 count of 317. He currently is treated with Biktarvy and notes compliance. He was seen in ID clinic recently for a possible abscess of the right arm, which did not need drainage. He was prescribed doxycycline but was unable to obtain this medication. He reports  that he was able to open this wound, with some purulent drainage.     Imaging completed for abdominal pain:  1/3/16 CT showed questionable bowel obstruction  7/21/16 CT showed low-grade small bowel obstruction in mid left abdomen  7/24/CT showed persistent dilated small bowel in mid abdomen.  Patient had exploratory lap yelitza for SBO versus ileus with negative exploration colonoscopy.  8/25/16 CT showed stable focal dilated loop of small bowel and severe gastric distention  11/6/17 CT showed circumferential thickening of the pylorus and single dilated loop of small bowel concerning for partial obstruction.  Patient had EGD and colonoscopy that did not show evidence of IBD.  1/31/18 abdominal CT showed acute appendicitis s/p appendectomy  2/4/18 CT showed abscess in the right lower quadrant s/p right percutaneous drain placement  2/10/18 abdominal CT showed right percutaneous drain with decreased size of abdominal abscess  4/16/18 abdominal CT showed small bowel obstruction with 2 possible transition point with potential close loops of of obstruction s/p exploratory laparoscopy with lysis of addition  4/20/18 abdominal CTs showed postsurgical changes and resolution of small bowel obstruction  7/4/18 abdominal CT showed small bowel obstruction with transition point in the right lower quadrant  9/10/18 abdominal CT showed loops of mildly dilated small bowel in the central abdomen and gentle transition point in the right mid abdomen  9/27/18 there was no bowel obstruction seen on CT but there was a gastric wall thickening concerning for gastritis.

## 2018-10-09 NOTE — PROGRESS NOTES
[facility], [place]    Internal Medicine Progress Note - [facility] Service    Assessment & Plan   Andrew Lockwood is a 50 year old male admitted on 10/5/2018. He has a history of IDDM, HIV on ART (CD4 > 300 in August, w VLUD), recent SBO with lysis of adhesions.    #acute on chronic abdominal pain  Patient has multiple admission for SBO like picture, most which resolved medically, but has required lysis of adhesions in the last year following appendectomy. Etiology of recurrent acute on chronic abd pain with intermittent at least partial SBO unclear, though suspecting infectious vs inflammatory vs structural. Though clinically improving with medical management.  - GI following, appreciate assistance, planning to discuss case in multidisciplinary conference to decide next steps in workup (advanced endoscopy? Capsule study? Ex lap assisted deep bowel enteroscopy?). Likely will occur as outpatient.   - Normal/negative studies: Lipase, serum CMV PCR (not detected), stool enteric panel, CDiff, Giardia Ag  - Fecal lactoferrin positive  - Hold miralax, which he is refusing  - Contiune PPI, hyoscyamine, gabapentin (dose increase 10/8)  - Avoid opioids, as they will contribute to dysmotility  - MgOx for bowel regimen  - simethicone scheduled  - rifaximin trial x 10 days (started 10/9)    #SSI of the right forearm  Bug bite that turned into skin and soft tissue infection with induration and history of purulence. No evidence of abscess, as it is draining. No systemic symptoms. Swabbed in clinic, growing MSSA. Responding to treatment.  - doxycycline x 7 days (d1 - 10/6)  - topical mupirocin TID while on doxycycline      #HIV  Followed in ID Clinic. Last CD4 317, viral load <20. On treatment.  - continue home bictegravir-emtricitabine-tenofovir daily      #insulin dependent DM  Last hgb A1c 8.7. Glucoses labile with increased PO intake, unclear insulin administration as patient absent from room for large  periods.  - glucose AC/HS  - increase lantus  - increase sliding scale insulin and add CHO coverage  - hypoglycemia protocol  - hold home empagliflozin    Diet: Advance to regular diet  Fluids: none  Potts Catheter: not present  DVT Prophylaxis: Ambulate every shift  Code Status: Prior  Dispo: likely today or tomorrow, pending need for further inpatient GI workup, tolerating diet, tolerable pain. Will have place to stay tomorrow.    Discussed with attending, Dr. Grijalva.    Mario Bone MD  PGY4 IM/Peds  [facility]  Please see sticky note for cross cover information    Interval History   - no acute events  - abd pain less severe, only occasional gas like pain  - denies N/V, tolerating regular diet  - feels that arm wound is improving    Data reviewed today: I reviewed all medications, new labs and imaging results over the last 24 hours.    Physical Exam   Vital Signs: Temp: 97.6  F (36.4  C) Temp src: Oral BP: 119/73 Pulse: 92 Heart Rate: 74 Resp: 18 SpO2: 97 % O2 Device: None (Room air)    Weight: 172 lbs 0 oz  Lying flat in bed in NAD  CTAB, no w/r/r  RRR, no m/g/r  Soft, some mild tenderness in LLQ, no rigidity/guarding, bowels active  Healing wound on right forearm with resolving erythema, no drainage  Alert, no focal deficits    Internal Medicine Staff Addendum  Date of Service: 10/9/2018\  I have seen and examined this patient, reviewed the data and discussed the plan of care. I agree with the above documentation including plan and ddx unless otherwise stated:     # discharge delayed as pt is homeless, but sister in town tomorrow and could stay with  Her.    Marcio Grijalva MD  Internal Medicine Hospitalist  [facility]  Attending pager: [phone]

## 2018-10-09 NOTE — PROGRESS NOTES
Visited with pt on the basis of spiritual support for the pt. Reflected with pt around his hospital experience, sources of spiritual and emotional support and current spiritual health needs. Pt talked about his current situation and what it means for him. . During my visit with the pt. no one was with him.  Pt was laying down on his bed. Pt reported that he lives in shelter and he does not have any family member in the [place]. During my conversation with him, I let him know that I could be support to him during his hospitalization. . I encourage him to see God as God of love, compassion and mercy full.  Emotional support. Reflective conversation integrating illness elements and family spiritual narratives. I shared conversation that would invite God into the room and to bless those who in the room. I provided special prayer asking God to help pt in this difficult time. I gave Islamic Prayer Booklet and several of Islamic Prayer Bookmarkers. I introduced spiritual Health Services that the hospital offers. I also, introduced myself as Christianity  in the hospital.    Pt received spiritual support and reflective conversation in the context of this hospitalization. The pt expressed appreciation for the visit and the encouragement that he felt that he has God s support in his struggles and that God knows of his suffering.      Will continue to provide support to pt/family during their hospitalization at least 1xk.

## 2018-10-10 VITALS
HEART RATE: 79 BPM | WEIGHT: 172.9 LBS | OXYGEN SATURATION: 100 % | DIASTOLIC BLOOD PRESSURE: 77 MMHG | SYSTOLIC BLOOD PRESSURE: 118 MMHG | TEMPERATURE: 97.5 F | RESPIRATION RATE: 18 BRPM | BODY MASS INDEX: 29.68 KG/M2

## 2018-10-10 LAB
GLUCOSE BLDC GLUCOMTR-MCNC: 211 MG/DL (ref 70–99)
GLUCOSE BLDC GLUCOMTR-MCNC: 319 MG/DL (ref 70–99)
GLUCOSE BLDC GLUCOMTR-MCNC: 343 MG/DL (ref 70–99)
GLUCOSE BLDC GLUCOMTR-MCNC: 440 MG/DL (ref 70–99)

## 2018-10-10 PROCEDURE — 25000132 ZZH RX MED GY IP 250 OP 250 PS 637: Performed by: INTERNAL MEDICINE

## 2018-10-10 PROCEDURE — 00000146 ZZHCL STATISTIC GLUCOSE BY METER IP

## 2018-10-10 PROCEDURE — 25000132 ZZH RX MED GY IP 250 OP 250 PS 637: Performed by: STUDENT IN AN ORGANIZED HEALTH CARE EDUCATION/TRAINING PROGRAM

## 2018-10-10 PROCEDURE — 25000131 ZZH RX MED GY IP 250 OP 636 PS 637: Performed by: INTERNAL MEDICINE

## 2018-10-10 PROCEDURE — 25000132 ZZH RX MED GY IP 250 OP 250 PS 637: Performed by: NURSE PRACTITIONER

## 2018-10-10 PROCEDURE — 99239 HOSP IP/OBS DSCHRG MGMT >30: CPT | Mod: GC | Performed by: INTERNAL MEDICINE

## 2018-10-10 RX ORDER — INSULIN GLARGINE 100 [IU]/ML
20 INJECTION, SOLUTION SUBCUTANEOUS DAILY
Qty: 15 ML | Refills: 0 | Status: SHIPPED | OUTPATIENT
Start: 2018-10-10 | End: 2019-04-13

## 2018-10-10 RX ADMIN — HYOSCYAMINE SULFATE 0.12 MG: 0.12 TABLET, ORALLY DISINTEGRATING ORAL at 08:03

## 2018-10-10 RX ADMIN — ONDANSETRON 4 MG: 4 TABLET, ORALLY DISINTEGRATING ORAL at 08:03

## 2018-10-10 RX ADMIN — DOCUSATE SODIUM 286 ML: 50 LIQUID ORAL at 03:15

## 2018-10-10 RX ADMIN — POLYETHYLENE GLYCOL 3350 17 G: 17 POWDER, FOR SOLUTION ORAL at 08:05

## 2018-10-10 RX ADMIN — RANITIDINE 150 MG: 150 TABLET ORAL at 08:03

## 2018-10-10 RX ADMIN — CALCIUM CARBONATE (ANTACID) CHEW TAB 500 MG 500 MG: 500 CHEW TAB at 01:47

## 2018-10-10 RX ADMIN — GABAPENTIN 200 MG: 100 CAPSULE ORAL at 08:02

## 2018-10-10 RX ADMIN — PROCHLORPERAZINE MALEATE 10 MG: 5 TABLET, FILM COATED ORAL at 01:51

## 2018-10-10 RX ADMIN — RIFAXIMIN 550 MG: 550 TABLET ORAL at 13:25

## 2018-10-10 RX ADMIN — SIMETHICONE CHEW TAB 80 MG 80 MG: 80 TABLET ORAL at 08:03

## 2018-10-10 RX ADMIN — MAGNESIUM OXIDE TAB 400 MG (241.3 MG ELEMENTAL MG) 400 MG: 400 (241.3 MG) TAB at 13:25

## 2018-10-10 RX ADMIN — SIMETHICONE CHEW TAB 80 MG 80 MG: 80 TABLET ORAL at 13:25

## 2018-10-10 RX ADMIN — DOXYCYCLINE HYCLATE 100 MG: 100 CAPSULE ORAL at 08:03

## 2018-10-10 RX ADMIN — POLYETHYLENE GLYCOL 3350 17 G: 17 POWDER, FOR SOLUTION ORAL at 13:25

## 2018-10-10 RX ADMIN — RIFAXIMIN 550 MG: 550 TABLET ORAL at 08:03

## 2018-10-10 RX ADMIN — GABAPENTIN 200 MG: 100 CAPSULE ORAL at 13:26

## 2018-10-10 RX ADMIN — PANTOPRAZOLE SODIUM 40 MG: 40 TABLET, DELAYED RELEASE ORAL at 08:03

## 2018-10-10 RX ADMIN — HYOSCYAMINE SULFATE 0.12 MG: 0.12 TABLET, ORALLY DISINTEGRATING ORAL at 01:47

## 2018-10-10 RX ADMIN — MUPIROCIN: 20 OINTMENT TOPICAL at 08:02

## 2018-10-10 RX ADMIN — HYOSCYAMINE SULFATE 0.12 MG: 0.12 TABLET, ORALLY DISINTEGRATING ORAL at 09:46

## 2018-10-10 ASSESSMENT — ACTIVITIES OF DAILY LIVING (ADL)
ADLS_ACUITY_SCORE: 10

## 2018-10-10 NOTE — PROVIDER NOTIFICATION
Pt 0200 BG check was 440.  Jonathan nails MD notified.  MD recommendation to reinforce education on dietary restrictions and letting the nurse know when he eats for carb coverage.  No extra insulin given d/t concerns for hypoglycemia and pt frequently leaving the unit for long periods of time/ inconsistent oral intake.    Evin Zambrano, RN on 10/10/2018 at 2:39 AM

## 2018-10-10 NOTE — PLAN OF CARE
Problem: Patient Care Overview  Goal: Plan of Care/Patient Progress Review  Outcome: Adequate for Discharge Date Met: 10/10/18  Pt A/Ox4, VSS and C/O abd pain. Scheduled meds given along with PRN hyoscyamine x1, minor relief. R PIV removed. RUE bug bite site closed. Pt to use muprocin ointment and PO abx. Last , pt received sliding scale insulin and Carb coverage. discontinue instructions explained to pt. Pt understood discontinue instructions and reiterated them via teach back. Pt instructed to  discontinue meds form [facility] discontinue pharmacy, continue abx for bug bite and f/u w/ colorectal surgery apt on Nov 13.

## 2018-10-10 NOTE — PLAN OF CARE
Problem: Patient Care Overview  Goal: Plan of Care/Patient Progress Review  /78 (BP Location: Right arm)  Pulse 79  Temp 95.5  F (35.3  C) (Oral)  Resp 18  Wt 78 kg (172 lb)  SpO2 96%  BMI 29.52 kg/m2     Reason for admission: N/V and abdominal pain.  Recent small bowel obstruction.  Hx of HIV, DMII, sleep apnea  Vitals: VSS.  BG continues to be high.   at 0154.  MD notified.  MD recommended continuing with current insulin orders and pt has unpredictable oral intake, and has been leaving the unit.  Activity: Up independently  Pain: abdominal cramping.  Gave hyoscyamine.  Neuro:  AO x 4  Cardiac: WDL  Respiratory: WDL  GI/: Pt reported nausea and cramping.  Compazine and Tums given. Pt refused glycerin suppository and requested a pink lady enema.  This was ordered and given, with improvement in abdominal sx.  Diet:  regular.  Pt does not adhere to a diabetic diet despite teaching.  Lines: PIV SL  Skin/Wounds:  small wound on left arm  Plan: possible discharge to shelter today.      Continue to monitor and follow POC      Evin Zambrano RN on 10/10/2018 at 6:03 AM

## 2018-10-10 NOTE — PLAN OF CARE
Problem: Patient Care Overview  Goal: Plan of Care/Patient Progress Review  Outcome: No Change  Reason for admission/Dx:   Adynamic ileus (H) [K56.0]  Nausea and vomiting, intractability of vomiting not specified, unspecified vomiting type [R11.2]     [Vitals]: /73 (BP Location: Right arm)  Pulse 92  Temp 97.6  F (36.4  C) (Oral)  Resp 18  Wt 78 kg (172 lb)  SpO2 97%  BMI 29.52 kg/m2     [BG]: 424-465. Team notified of pt's elevated BG, new sliding scale orders in addition to carb coverage at mealtime   [Electrolytes]: Potassium 3.7, replacement completed.  Recheck due tomorrow        [Cardiac]: WDL  [Pain/PRN/s]: WDL, denies pain    [Respiratory]: WDL  [Lines]:  PIV SL   [Infusions]: N/A    [Neuros]: WDL         [GI]:  Active Diet Order      Regular Diet Adult      Diet   -Tolerated PO intake with good appetite.  -Pt frequently leaving room and thus not getting insulin at scheduled times,not always telling staff when he begins eating.  -Pt reported having very small stool this evening. But mostly having flatus  [Activity]: Pt up independently and frequently leaving unit without notifying nurse to smoke. Medications intermittently late as a result.    []: WDL     [Skin/Wounds]: Bactroban cream applied to wound on R arm             Plan:  Plan for possible discharge to shelter tomorrow once team able to speak with patient regarding discharge.

## 2018-10-10 NOTE — PROGRESS NOTES
Social Work Services Progress Note     Hospital Day: 6  Collaborated with:  Primary team Cristina Braden, patient     Data:  Pt is a 50-year-old male admitted on 10/5/18.      Intervention:  Per primary team, pt to discharge today to his sister's home. Met with pt and pt confirms plan to stay with his sister.     Assessment:  Pt planning to d/c to sister's home     Plan:    Anticipated Disposition:  Sister's home    Barriers to d/c plan:  None identified    Follow Up:  SW to follow as needed     RADHA Reeves, [facility]  5A Unit   Pager [phone]  Phone [phone]

## 2018-10-10 NOTE — PROVIDER NOTIFICATION
Writer observed pt taking at least 3 packets of esau crackers from the unit despite DM education.  Attempted to give insulin carb coverage, however patient was off the unit.     Evin Zambrano RN on 10/10/2018 at 12:14 AM

## 2018-10-10 NOTE — PROGRESS NOTES
GASTROENTEROLOGY PROGRESS NOTE       Patient Summary   nAdrew Lockwood is a 50 year old male with a past medical history of HIV/AIDS on Biktarvy, DM type II on insulin, CNS toxoplasmosis, acute appendicitis s/p appendectomy on 1/31/18, SBO s/p lysis of adhesions, chronic abdominal pain and vomiting.        ASSESSMENT AND RECOMMENDATIONS:   #Acute on chronic abdominal pain  #[place] and vomiting   #Acute on chronic partial small bowel obstruction  #Hypokalemia   #Chronic constipation   Patient presented with midline abdominal pain, nausea and non-bloody emesis. On exam, abdomen is soft, active bowel sound in all quadrants but patient has tenderness in upper midline abdomen to palpation. On admission, CRP was 14, sed rate 23, WBC 10.4, lactic acid 1.1, and potassium was 3.3 but the rest of CMP and CBC are within normal limits. Abd/pelvic CT from 10/5/18 showed focal small bowel enteritis with associated wall thickening and narrowing small bowel dilation proximal to the inflammation concerning for partial small bowel obstruction. C. Diff, enteric pathogens, giardia, ova and parasite are negative.     Concern for SB obstructions prior to any surgery starting in 2016. Imaging until 4/2018 only revealed areas of bowel dilation/distention. Transition points not seen until patient had undergone a few surgeries. Notably ex lap done after one of these scans these did not reveal a transition point.    Chronic constipation that has been present since he was a child concerns for dysmotility issues versus diet related. Bloating and ongoing abdominal pain relieved with flatus in the setting of poorly controlled diabetes concerns for small bowel bacterial overgrowth. Intermittent enteritis with dilation in setting of multiple surgeries and appendicitis with post-op abscess and adhesive disease, concern is adhesive disease versus ileus.    Thus far no evidence to suggest Crohn's disease (negative EGD and colonoscopy, negative ex lap  with running of the bowel x 2, appendix pathology was consistent with acute appendicitis). CRP <2.9, and WBC is 6.2. Albumin 2.9, protein 6.2 and creatinine 0.72, patient may have dietary contribution as his meals are not available in regular basis.     Dr. Molina have reviewed images with radiology and the enteritis versus stricture was determined to be unreachable with push enteroscopy. However, there was no SBO identified as there was no transition point. The options include getting capsule for target intraluminal lesion, but then there is the risk of capsule endoscopy getting stuck in the area of stricture/entritis that may require surgical removal. Another question is the utility of laparoscopic guided endoscopy versus single/double balloon enteroscopy. All these questions will be discussed with the multidisciplinary team on Thursday 10/25/18 at GI luminal/IBD care conference. In the meantime, plan is to continue with medical management including relieving constipation, optimizing electrolytes, and scheduled antiemetics.       Recommendations  --Diet as tolerated, low residue diet if possible  --Continue treating SIBO with oral Rifaximin x10 days (550mg TID)  --Continue with aggressive bowel regimen, and if patient cannot tolerate Miralax, recommend Mg oxide 400-500 mg daily as osmotic laxative, titrate to effect up to 1000 mg daily  --40mg of IV Protonix twice daily for possible gastritis seen on CT   --Avoid narcotics as it can worsen obstruction, nausea and vomiting   --Avoid NSAIDs   --Consider Bentyl/Hyosycamine and gabapentin for pain  --Present patient to the multidisciplinary team at the GI luminal care conference to decide best approach to the enteritis/stricture noted on CT   --Patient is scheduled with Dr. Lundberg on November 13th, 2018 at 10:00am, his appointment will be at [facility]  --GI team is signing off patient to primary team     Pt care plan discussed with Dr. Molina, GI staff  "physician. Thank you for involving us in this patient's care. Please do not hesitate to contact the GI service with any questions or concerns.    VIKASH Gamboa [facility]  Department of Gastroenterology   P: [phone]  Subjective\events\changes within the 24 hours:   Patient has occasional abdominal cramps after eating but he is passing gas, having stools and no n/v. Patient verbalizes understanding the importance of showing up to his appointments. And he does have \"my chart\" where he can receive messages.      4 point ROS performed and negative unless noted above.           Medications:     Current Facility-Administered Medications   Medication     bictegravir-emtricitabine-tenofovir (BIKTARVY) -25 MG per tablet 1 tablet     calcium carbonate (TUMS) chewable tablet 500 mg     glucose gel 15-30 g    Or     dextrose 50 % injection 25-50 mL    Or     glucagon injection 1 mg     doxycycline (VIBRAMYCIN) capsule 100 mg     gabapentin (NEURONTIN) capsule 200 mg     glycerin (adult) Suppository 1 suppository     hydrOXYzine (ATARAX) tablet 25 mg     hyoscyamine ODT tablet 0.125 mg     hyoscyamine ODT tablet 0.125 mg     insulin aspart (NovoLOG) inj (RAPID ACTING)     insulin aspart (NovoLOG) inj (RAPID ACTING)     insulin aspart (NovoLOG) inj (RAPID ACTING)     insulin aspart (NovoLOG) inj (RAPID ACTING)     insulin aspart (NovoLOG) inj (RAPID ACTING)     insulin aspart (NovoLOG) inj (RAPID ACTING)     insulin glargine (LANTUS) injection 20 Units     lidocaine (LMX4) cream     lidocaine 1 % 1 mL     magnesium oxide (MAG-OX) tablet 400 mg     magnesium sulfate 2 g in NS intermittent infusion ([facility]arMEDium or [facility] Cmpd)     magnesium sulfate 4 g in 100 mL sterile water (premade)     mupirocin (BACTROBAN) 2 % ointment     ondansetron (ZOFRAN-ODT) ODT tab 4 mg     pantoprazole (PROTONIX) EC tablet 40 mg     polyethylene glycol (MIRALAX/GLYCOLAX) Packet 17 g     potassium chloride (KLOR-CON) Packet 20-40 mEq     " potassium chloride 10 mEq in 100 mL intermittent infusion with 10 mg lidocaine     potassium chloride 10 mEq in 100 mL sterile water intermittent infusion (premix)     potassium chloride 20 mEq in 50 mL intermittent infusion     potassium chloride SA (K-DUR/KLOR-CON M) CR tablet 20-40 mEq     potassium phosphate 10 mmol in D5W 250 mL intermittent infusion     potassium phosphate 15 mmol in D5W 250 mL intermittent infusion     potassium phosphate 20 mmol in D5W 250 mL intermittent infusion     potassium phosphate 20 mmol in D5W 500 mL intermittent infusion     potassium phosphate 25 mmol in D5W 500 mL intermittent infusion     prochlorperazine (COMPAZINE) injection 10 mg    Or     prochlorperazine (COMPAZINE) tablet 10 mg    Or     prochlorperazine (COMPAZINE) Suppository 25 mg     ranitidine (ZANTAC) tablet 150 mg     rifaximin (XIFAXAN) tablet 550 mg     simethicone (MYLICON) chewable tablet 80 mg     sodium chloride (PF) 0.9% PF flush 3 mL     sodium chloride (PF) 0.9% PF flush 3 mL        Physical Exam   Blood pressure 118/77, pulse 79, temperature 97.5  F (36.4  C), temperature source Oral, resp. rate 18, weight 78.4 kg (172 lb 14.4 oz), SpO2 100 %.  Constitutional: cooperative, pleasant, not dyspneic/diaphoretic, no acute distress  Eyes: Sclera anicteric/injected  Ears/nose/mouth/throat: Normal oropharynx without ulcers or exudate, mucus membranes moist, hearing intact  Neck: supple, thyroid normal size  CV: RRR. No edema in LE bilaterally   Respiratory: Unlabored breathing. CTA bilaterally   Lymph: No axillary, submandibular, supraclavicular or inguinal lymphadenopathy  Abd: Nondistended, +bs, no hepatosplenomegaly, no peritoneal signs. Tenderness in midline from epigastric to umbilical   Skin: warm, perfused, no jaundice  Neuro: AAO x 3, No asterixis  Psych: Normal affect  MSK: Normal gait    Data   Current Labs  CBC    Recent Labs  Lab 10/08/18  0720 10/07/18  0813 10/06/18  0658 10/05/18  0818   WBC 6.2 5.4  6.3 10.4   RBC 4.37* 4.46 4.40 4.98   HGB 13.3 13.6 13.3 15.7   HCT 39.8* 41.5 40.3 44.7   MCV 91 93 92 90   MCH 30.4 30.5 30.2 31.5   MCHC 33.4 32.8 33.0 35.1   RDW 13.4 13.7 13.7 13.8    231 229 282     BMP    Recent Labs  Lab 10/08/18  0720 10/07/18  0813 10/06/18  0658 10/05/18  0818    138 137 138   POTASSIUM 3.7 3.9 3.9 3.3*   CHLORIDE 103 106 106 104   CO2 26 25 25 24   ANIONGAP 8 8 6 10   * 183* 172* 225*   BUN 8 11 12 13   CR 0.72 0.72 0.78 0.81   GFRESTIMATED >90 >90 >90 >90   GFRESTBLACK >90 >90 >90 >90   LINDA 8.7 8.6 8.0* 9.6   MAG  --   --  2.1  --    PHOS  --   --  2.8 3.9      INRNo lab results found in last 7 days.  Liver panel    Recent Labs  Lab 10/08/18  0720 10/07/18  0813 10/06/18  0658 10/05/18  0818   PROTTOTAL 6.2* 6.6* 6.2* 8.0   ALBUMIN 2.9* 2.9* 2.8* 3.8   BILITOTAL 0.6 0.8 0.7 0.7   ALKPHOS 95 104 100 144   AST 12 10 13 15   ALT 18 18 19 25          History of Present Illness:   Andrew Lockwood is a 50 year old male with a history of HIV/AIDS, DM type II on insulin, CNS toxoplasmosis, acute appendicitis s/p appendectomy on 1/31/18, SBO s/p lysis of adhesions, chronic abdominal pain and vomiting.       Around 11pm on 10/4/18 patient developed mild abdominal pain and around 3am on 10/5/18 pain suddenly became severe. Pain is in epigastric to the umbilical, in midline. The last flatus was around 6am to 7am, and last bowel movement was 6pm last night. He has been vomiting since 4am on 10/5/18. He denies fevers, chills, hematemesis, hematochezia, melena, oral sores, odynophagia and dysphagia. He was recently admitted from 9/10-9/13 for partial SBO, which was medically managed following surgical consultation. He was scheduled to see GI as an outpatient for evaluation of recurrent SBO for possible balloon enteroscopy, though he was unable to make this appointment due to homelessness and difficulty with transportation. He has a sister with Crohn's Disease who lives in [place]. He  denies travel out of the [place] in over a decade and he lives at [facility] (not sure if anyone is sick at the shelter). He has a hx of HIV/AIDS, followed in ID clinic, with <20 HIV RNA and an absolute CD4 count of 317. He currently is treated with Biktarvy and notes compliance. He was seen in ID clinic recently for a possible abscess of the right arm, which did not need drainage. He was prescribed doxycycline but was unable to obtain this medication. He reports that he was able to open this wound, with some purulent drainage.     Imaging completed for abdominal pain:  1/3/16 CT showed questionable bowel obstruction  7/21/16 CT showed low-grade small bowel obstruction in mid left abdomen  7/24/CT showed persistent dilated small bowel in mid abdomen.  Patient had exploratory lap yelitza for SBO versus ileus with negative exploration colonoscopy.  8/25/16 CT showed stable focal dilated loop of small bowel and severe gastric distention  11/6/17 CT showed circumferential thickening of the pylorus and single dilated loop of small bowel concerning for partial obstruction.  Patient had EGD and colonoscopy that did not show evidence of IBD.  1/31/18 abdominal CT showed acute appendicitis s/p appendectomy  2/4/18 CT showed abscess in the right lower quadrant s/p right percutaneous drain placement  2/10/18 abdominal CT showed right percutaneous drain with decreased size of abdominal abscess  4/16/18 abdominal CT showed small bowel obstruction with 2 possible transition point with potential close loops of of obstruction s/p exploratory laparoscopy with lysis of addition  4/20/18 abdominal CTs showed postsurgical changes and resolution of small bowel obstruction  7/4/18 abdominal CT showed small bowel obstruction with transition point in the right lower quadrant  9/10/18 abdominal CT showed loops of mildly dilated small bowel in the central abdomen and gentle transition point in the right mid abdomen  9/27/18 there was no  bowel obstruction seen on CT but there was a gastric wall thickening concerning for gastritis.

## 2018-10-11 ENCOUNTER — TELEPHONE (OUTPATIENT)
Dept: PHARMACY | Facility: CLINIC | Age: 53
End: 2018-10-11

## 2018-10-11 ENCOUNTER — PATIENT OUTREACH (OUTPATIENT)
Dept: CARE COORDINATION | Facility: CLINIC | Age: 53
End: 2018-10-11

## 2018-10-11 NOTE — DISCHARGE SUMMARY
"[facility], [place]    Internal Medicine Discharge Summary- Cristina Service    Date of Admission:  10/5/2018  Date of Discharge:  10/10/2018  2:45 PM  Discharging Attending Provider: Dr. Grijalva  Discharge Team: Cristina 5    Discharge Diagnoses    Acute on chronic abdominal pain  Recurrent partial SBO  Chronic constipation  MSSA right forearm skin and soft tissue infection    Follow-ups Needed After Discharge   - Follow up with PCP within 1 week for hospital follow up, discuss diabetes management, check in on GI symptoms.  - Colorectal Surgery appointment on Nov 13 with Dr. Lundberg, discuss next steps for further evaluation of ongoing abd pain  - OF NOTE, PATIENT DOES NOT HAVE A PHONE AND IS HOMELESS. IF PROVIDERS NEED TO COMMUNICATE WITH HIS HE WILL RESPOND Dr. Tariff MESSAGES OR EMAIL (SEE \"DEMOGRAPHICS\" TAB FOR HIS EMAIL). HE USES THE COMPUTER AT THE [employer] FREQUENTLY.    Hospital Course   Andrew Lockwood was admitted on 10/5/2018 for acute on chronic abdominal pain, recurrent partial SBO and right forearm skin and soft tissue infection.  The following problems were addressed during his hospitalization:    #Acute on chronic abdominal pain  #recurrent partial SBO  Andrew has a history of the above over the last few years and this admission workup found elevated inflammatory markers, negative infectious studies and a CT A/P which demonstrated focal small bowel enteritis and associated wall thickening and proximal dilation concerning for partial SBO. Differential included infectious vs inflammatory vs structural vs endocrine (gastroparesis). Since in the recent past he had an appendectomy c/b abscess and SBO 2/2 adhesions, surgery was consulted upon admission and recommended medical management, as this did not appear to be recurrence of this issue. GI was consulted and recommended an infectious workup, treatment of constipation, consideration of treatment for SIBO and medical management of his " constellation of GI symptoms. Infectious studies were negative as per below. Primarily with bowel rest without NG decompression his symptoms improved and his inflammation resolved without antimicrobials. His symptoms were reasonably controlled with hyoscyamine, gabapentin, PPI and rifaximin. Mg Ox and pink lady enemas were utilized to address his constipation. The GI Team reviewed his extensive imaging with radiology and discussed his case at a multidisciplinary conference, then presented him with options for further evaluation and potential treatment of his ongoing issues, as there was a possibility that his recurrent partial SBOs could be due to a structural abnormality such as an intraluminal stricture (which would not have been identified on prior ex lap alone nor endoscopy). Given the options presented he opted for future combined outpatient procedure with GI and Colorectal Surgery which is well documented in the GI note from 10/10 (by Dr. Simmons and Giuseppe SUH CNP).   - he will follow up with Dr. Lundberg of Colorectal Surgery as an outpatient on November 13 at 10AM  - he was not seen by Colorectal Surgery in the hospital, but his case was extensively discussed with GI  - he left the hospital with this appointment scheduled and multiple team members of various teams urged him to follow up  - he will continue Mg Ox and enemas prn constipation  - he can increase Mg Ox to 1,000 mg daily if needed  - he will continue a 10 day trial of rifaxmin  - he will continue hyoscyamine, gabapentin and PPI for discomfort    #MSSA skin and soft tissue infection of the right forearm  He was seen in ID clinic before admission for the above. Doxycycline was ordered but not started. The wound was swabbed and the culture isolated MSSA. His wound was draining upon admission, with no sign of abscess. He responded well to doxycycline and topical mupirocin, which he will continue for a 7 day course.    #HIV  Followed in  ID Clinic. Last CD4 317, viral load <20. On treatment.  - continue home bictegravir-emtricitabine-tenofovir daily  - follow up in ID clinic      #insulin dependent DM  Last hgb A1c 8.7. Though glucoses were poorly controlled due to dietary indiscretion and inconsistent insulin administration in the hospital. Prior to admission, he noted being on up to 50 units of long acting insulin daily. He required significantly less inpatient. At time of discharge he was on 20 units of lantus daily, with sliding scale insulin and CHO coverage, with discharge glucose being 211. He was given a prescription for a new basaglar pen, 20 units daily at discharge. He will resume empagliflozin at discharge. He should follow up with his PCP 1 week after discharge to discuss DM management, as poorly controlled hyperglycemia may also contribute to his ongoing GI symptoms, including risk for gastroparesis.    Consultations This Hospital Stay   MEDICATION HISTORY IP PHARMACY CONSULT  GI LUMINAL ADULT IP CONSULT  SURGERY GENERAL ADULT IP CONSULT  VASCULAR ACCESS CARE ADULT IP CONSULT  VASCULAR ACCESS CARE ADULT IP CONSULT  NUTRITION SERVICES ADULT IP CONSULT    Code Status   Full Code     The patient was discussed with Dr. Rudi Bone MD  [facility]  Pager: [phone]  ______________________________________________________________________    Physical Exam   Vital Signs: Temp: 97.5  F (36.4  C) Temp src: Oral BP: 118/77 Pulse: 79 Heart Rate: 87 Resp: 18 SpO2: 100 % O2 Device: None (Room air)    Weight: 172 lbs 14.4 oz    General Appearance: Walking around, NAD  Respiratory: CTAB, no w/r/r  Cardiovascular: RRR, no m/g/r  GI: distended but soft, non tender, no rigidity/guarding, BS+  Skin: right forearm wound healing with no surrounding erythema, fluctuance or purulent drainage  Other: alert, no focal deficits    Significant Results and Procedures    Negative  - lipase, serum CMV PCR, enteric stool panel, C  diff, Giardia Ag, O&P x 3    Most Recent 3 CBC's:  Recent Labs   Lab Test  10/08/18   0720  10/07/18   0813  10/06/18   0658   WBC  6.2  5.4  6.3   HGB  13.3  13.6  13.3   MCV  91  93  92   PLT  225  231  229     Most Recent 3 BMP's:  Recent Labs   Lab Test  10/08/18   0720  10/07/18   0813  10/06/18   0658   NA  137  138  137   POTASSIUM  3.7  3.9  3.9   CHLORIDE  103  106  106   CO2  26  25  25   BUN  8  11  12   CR  0.72  0.72  0.78   ANIONGAP  8  8  6   LINDA  8.7  8.6  8.0*   GLC  281*  183*  172*     Most Recent 2 LFT's:  Recent Labs   Lab Test  10/08/18   0720  10/07/18   0813   AST  12  10   ALT  18  18   ALKPHOS  95  104   BILITOTAL  0.6  0.8     Most Recent 3 INR's:  Recent Labs   Lab Test  02/05/18   0901  01/01/18   0412  11/06/17   2346   INR  1.06  0.98  0.91     Most Recent 6 Bacteria Isolates From Any Culture (See EPIC Reports for Culture Details):  Recent Labs   Lab Test  10/04/18   0945  02/05/18   1200  02/04/18   1640  11/07/17   1219  11/07/17   0113  05/17/16   1938   CULT  Moderate growth  Staphylococcus aureus  This isolate DOES NOT demonstrate inducible clindamycin resistance in vitro. Clindamycin   is susceptible and could be used when indicated, however, erythromycin is resistant and   should not be used.  *  Culture negative after 4 weeks  Heavy growth  Bacteroides fragilis group  Susceptibility testing not routinely done  *  Heavy growth  Bacteroides fragilis  Susceptibility testing not routinely done  *  Plus  Heavy growth  Mixed aerobic and anaerobic sushma  *  Moderate growth  Escherichia coli  *  Heavy growth  Streptococcus anginosus  *  Heavy growth  Mixed gram positive sushma    Canceled, Test credited  Incorrectly ordered by PCU/Clinic  Test reordered as correct code  No growth  No growth  Cultured on the 2nd day of incubation:  Staphylococcus capitis  This isolate DOES NOT demonstrate inducible clindamycin resistance in vitro. Clindamycin   is susceptible and could be used  when indicated, however, erythromycin is resistant and   should not be used.  *  Critical Value/Significant Value, preliminary result only, called to and read back by  Katalina BUI at 1555 on 11/9/17 by MIGUEL.    (Note)  POSITIVE for Staphylococci other than S.aureus, S.epidermidis and  S.lugdunensis, by Smart Educationigene multiplex nucleic acid test.  Coagulase-negative staphylococci are the most common venipuncture or  collection associated skin CONTAMINANTS grown in blood cultures.  Final identification and antimicrobial susceptibility testing will be  verified by standard methods.     Specimen tested with Verigene multiplex, gram-positive blood culture  nucleic acid test for the following targets: Staph aureus, Staph  epidermidis, Staph lugdunensis, other Staph species, Enterococcus  faecalis, Enterococcus faecium, Streptococcus species, S. agalactiae,  S. anginosus grp., S. pneumoniae, S. pyogenes, Listeria sp., mecA  (methicillin resistance) and Ramon/B (vancomycin resistance).    Critical Value/Significant Value called to and read back by Analy Doe RN, @1810 11/9/17.DH.    No growth     Most Recent Hemoglobin A1c:  Recent Labs   Lab Test  07/19/18   1147   A1C  8.9*     Most Recent ESR & CRP:  Recent Labs   Lab Test  10/08/18   0720   10/05/18   0818   SED   --    --   23*   CRP  <2.9   < >  14.0*    < > = values in this interval not displayed.   ,   Results for orders placed or performed during the hospital encounter of 10/05/18   XR Abdomen 2 Views    Narrative    Exam: XR ABDOMEN 2 VW 10/5/2018 9:00 AM    History: HIV/AIDS, history of small bowel obstruction    Comparison: CT 9/27/2018    Findings: Upright and supine views of the abdomen were evaluated.  There is a relative paucity of small bowel gas. No portal venous gas,  pneumatosis, or free intraperitoneal gas. Lung bases are relatively  clear. No acute bony abnormality.      Impression    Impression: Not certain bowel gas pattern. Mild colonic stool  burden.    ALEJANDRINA MARS MD   CT Abdomen Pelvis w Contrast    Narrative    EXAMINATION: CT ABDOMEN PELVIS W CONTRAST, 10/5/2018 10:56 AM    TECHNIQUE:  Helical CT images from the lung bases through the  symphysis pubis were obtained  with IV contrast. Contrast dose:  iopamidol (ISOVUE-370) solution 105 mL    COMPARISON: 9/27/2018    HISTORY: abdl pain, n/v HIV;     FINDINGS:  Mild dependent atelectasis in lung bases.    Stomach remains distended with debris, similar to previous. There is  continued hyperemia and wall thickening on the stomach most pronounced  in the antrum but also involving the gastric body to greater extent  than on previous exam.    In the mid abdomen small bowel, best seen on series 3 image 23, there  is focal bowel dilation measuring up to 4.5 cm which is just proximal  to an area of narrowing, wall thickening, and hyperemia measuring  approximately 5 cm. Fecalization of the dilated segment. The  inflammatory changes involving the thickened segment of bowel extend  into the associated mesentery. This inflammation has progressed from  previous and the bowel dilation is new from previous.    Colon is unremarkable. No adenopathy in the abdomen or pelvis.  Scattered atherosclerotic vascular calcifications. Small hypodensity  in the right hepatic lobe on series 5 image 121 has benign appearance  and is stable from comparison exam, small to fully characterize. The  pancreas, adrenal glands, spleen, gallbladder, and kidneys are  unremarkable. Penile prosthesis is again seen with reservoir in the  left pelvis. Bladder is unremarkable.    No aggressive bone lesion.      Impression    IMPRESSION:   1. Focal mid small bowel enteritis associated wall thickening,  narrowing, inflammation. There is new associated dilation of  visualized bowel proximal to this area of inflammation compatible with  a functional partial obstruction. Infectious or inflammatory  etiologies should be considered.  2. Continued  hyperemic appearance of the stomach with questionable  wall thickening, potentially representing gastritis.    ALFREDA SHIPLEY MD     Primary Care Physician   Physician No Ref-Primary    Discharge Disposition   Planned to stay with his sister at discharge    Condition at discharge: Stable    Discharge Orders     Colorectal Surgery Referral     Reason for your hospital stay   Gastroenteritis, partial SBO     Activity   Your activity upon discharge: activity as tolerated     When to contact your care team   New fevers, severe abdominal pain, nausea/vomiting with inability to drink fluids, no bowel movement in 3 days, new redness/swelling/pus from right arm wound, concerns for high or low blood sugars.     Wound care and dressings   Instructions to care for your wound at home:   - guaze or band aid to right arm wound, apply mupirocin until the doxycycline is finished     Adult [facility] Follow-up and recommended labs and tests   1. Colorectal Surgery Clinic appointment on November 13th at 10AM with Dr. Lundberg.    3. Follow up in primary care clinic within 1 week for hospital follow up, check in on diabetes.    2. Follow up with ID Clinic per usual routine, sooner if right arm infection worsens or does not resolve.    Appointments on [place] and/or [facility] (with [facility] or [facility] provider or service). Call [phone] if you haven't heard regarding these appointments within 7 days of discharge.     Full Code     Diet   Follow this diet upon discharge:  Diabetic diet.  low gas diet (avoiding cruciferous vegetables, cabbage, beans, etc).  no carbonated beverages, no use of straws, no gum, no hard candy. No gulping.       Discharge Medications   Discharge Medication List as of 10/10/2018  1:13 PM      START taking these medications    Details   calcium carbonate (TUMS) 500 MG chewable tablet Take 2 tablets (1,000 mg) by mouth 3 times daily as needed for heartburn, Disp-150 tablet, R-0, E-Prescribe      gabapentin  (NEURONTIN) 100 MG capsule Take 2 capsules (200 mg) by mouth 3 times daily, Disp-90 capsule, R-0, E-Prescribe      glycerin, adult, 2 g SUPP Suppository Place 1 suppository rectally daily as needed for constipation, Disp-5 suppository, R-0, E-Prescribe      hyoscyamine 0.125 MG TBDP Take 2 tablets (0.25 mg) by mouth 4 times daily as needed for cramping, Disp-60 tablet, R-0, E-Prescribe      magnesium oxide (MAG-OX) 400 MG tablet Take 1 tablet (400 mg) by mouth daily, Disp-30 tablet, R-0, E-PrescribeMay increase up to 1,000 mg daily if not having bowel movements      mupirocin (BACTROBAN) 2 % ointment Apply topically 3 times daily for 3 daysDisp-22 g, [id number]      ondansetron (ZOFRAN-ODT) 4 MG ODT tab Take 1 tablet (4 mg) by mouth 2 times daily, Disp-6 tablet, R-0, E-Prescribe      rifaximin (XIFAXAN) 550 MG TABS tablet Take 1 tablet (550 mg) by mouth 3 times daily for 10 days, Disp-30 tablet, R-0, E-Prescribe      simethicone (MYLICON) 80 MG chewable tablet Take 1 tablet (80 mg) by mouth 4 times daily, Disp-30 tablet, R-0, E-Prescribe         CONTINUE these medications which have CHANGED    Details   !! BASAGLAR 100 UNIT/ML injection Inject 20 Units Subcutaneous daily, Disp-15 mL, R-0, E-Prescribe      doxycycline (VIBRAMYCIN) 100 MG capsule Take 1 capsule (100 mg) by mouth 2 times daily for 7 doses, Disp-7 capsule, R-0, E-Prescribe      !! insulin glargine (LANTUS SOLOSTAR) 100 UNIT/ML pen Inject 20 Units Subcutaneous every morning, Disp-15 mL, R-0, E-Prescribe       !! - Potential duplicate medications found. Please discuss with provider.      CONTINUE these medications which have NOT CHANGED    Details   Bictegravir-Emtricitab-Tenofov -25 MG TABS Take 1 tablet by mouth daily, Disp-30 tablet, R-11, E-Prescribe      empagliflozin (JARDIANCE) 10 MG TABS tablet Take 1 tablet by mouth daily, Disp-90 tablet, R-3, E-Prescribe      omeprazole (PRILOSEC) 20 MG CR capsule Take 1 capsule (20 mg) by mouth 2  times daily, Disp-180 capsule, R-3, E-Prescribe      ranitidine (ZANTAC) 150 MG tablet Take 1 tablet (150 mg) by mouth 2 times daily, Disp-60 tablet, R-3, E-Prescribe      blood glucose (NO BRAND SPECIFIED) lancets standard Use to test blood sugar four times daily or as directed.Disp-100 Box, R-5Local Print      blood glucose monitoring (NO BRAND SPECIFIED) meter device kit Use to test blood sugar 4 times daily or as directed.Disp-1 kit, [id number]      blood glucose monitoring (NO BRAND SPECIFIED) test strip 1 strip by In Vitro route 4 times daily (before meals and nightly) Use to test blood sugars 4 times daily or as directed, Disp-100 strip, R-11, E-Prescribe      insulin pen needle (B-D U/F) 31G X 8 MM Use 1 pen needles daily or as directed.Disp-100 each, [id number]           Allergies   Allergies   Allergen Reactions     Metformin      Abdominal pain     Milk [Lac Bovis] Hives     Tylenol [Acetaminophen] Itching     Dulaglutide Rash       Staff Addendum to Discharge Summary  Date of Service: 10/10/2018  I have seen and examined the patient, reviewed the data and discussed the plan of care with the service team.  I agree with the above documentation.    >30 minutes spent in discharge, including >50% in counseling and coordination of care, medication review and plan of care recommended on follow up. Questions were answered at length with patient including medication counseling and follow-up care.     It was our pleasure to care for the patient during this hospitalization. Please do not hesitate to contact me should there be questions regarding the hospital course or discharge plan.      Marcio Grijalva MD   of Medicine  Internal Medicine [facility]

## 2018-10-11 NOTE — PROGRESS NOTES
"OF NOTE, PATIENT DOES NOT HAVE A PHONE AND IS HOMELESS. IF PROVIDERS NEED TO COMMUNICATE WITH HIS HE WILL RESPOND [employer] MESSAGES OR EMAIL (SEE \"DEMOGRAPHICS\" TAB FOR HIS EMAIL). HE USES THE [employer]PUT[employer] [facility] [employer]LY    No discharge call will be made today  "

## 2018-10-11 NOTE — TELEPHONE ENCOUNTER
Andrew came to the pharmacy with his discharge medications. I put together 1 week medbox for him.     Lenka Mabry, [facility]  [phone]

## 2018-10-23 DIAGNOSIS — K21.00 GASTROESOPHAGEAL REFLUX DISEASE WITH ESOPHAGITIS: ICD-10-CM

## 2018-10-24 RX ORDER — CALCIUM CARBONATE 500 MG/1
2 TABLET, CHEWABLE ORAL 3 TIMES DAILY PRN
Qty: 150 TABLET | Refills: 3 | Status: SHIPPED | OUTPATIENT
Start: 2018-10-24 | End: 2019-04-15

## 2018-10-30 ENCOUNTER — APPOINTMENT (OUTPATIENT)
Dept: GENERAL RADIOLOGY | Facility: CLINIC | Age: 53
End: 2018-10-30
Attending: INTERNAL MEDICINE
Payer: COMMERCIAL

## 2018-10-30 ENCOUNTER — HOSPITAL ENCOUNTER (EMERGENCY)
Facility: CLINIC | Age: 53
Discharge: HOME | End: 2018-10-30
Attending: INTERNAL MEDICINE | Admitting: INTERNAL MEDICINE
Payer: COMMERCIAL

## 2018-10-30 VITALS
BODY MASS INDEX: 28.46 KG/M2 | RESPIRATION RATE: 14 BRPM | TEMPERATURE: 98.4 F | SYSTOLIC BLOOD PRESSURE: 131 MMHG | WEIGHT: 166.7 LBS | DIASTOLIC BLOOD PRESSURE: 82 MMHG | HEIGHT: 64 IN | HEART RATE: 74 BPM | OXYGEN SATURATION: 98 %

## 2018-10-30 DIAGNOSIS — R73.9 HYPERGLYCEMIA: ICD-10-CM

## 2018-10-30 DIAGNOSIS — Z79.4 TYPE 2 DIABETES MELLITUS WITHOUT COMPLICATION, WITH LONG-TERM CURRENT USE OF INSULIN (H): ICD-10-CM

## 2018-10-30 DIAGNOSIS — E11.9 TYPE 2 DIABETES MELLITUS WITHOUT COMPLICATION, WITH LONG-TERM CURRENT USE OF INSULIN (H): ICD-10-CM

## 2018-10-30 DIAGNOSIS — R12 HEART BURN: ICD-10-CM

## 2018-10-30 DIAGNOSIS — Z21 HUMAN IMMUNODEFICIENCY VIRUS I INFECTION (H): ICD-10-CM

## 2018-10-30 LAB
ALBUMIN SERPL-MCNC: 4.1 G/DL (ref 3.4–5)
ALBUMIN UR-MCNC: NEGATIVE MG/DL
ALP SERPL-CCNC: 129 U/L (ref 40–150)
ALT SERPL W P-5'-P-CCNC: 34 U/L (ref 0–70)
ANION GAP SERPL CALCULATED.3IONS-SCNC: 10 MMOL/L (ref 3–14)
APPEARANCE UR: CLEAR
AST SERPL W P-5'-P-CCNC: 32 U/L (ref 0–45)
BASOPHILS # BLD AUTO: 0 10E9/L (ref 0–0.2)
BASOPHILS # BLD AUTO: NORMAL 10E9/L (ref 0–0.2)
BASOPHILS NFR BLD AUTO: 0.5 %
BASOPHILS NFR BLD AUTO: NORMAL %
BILIRUB SERPL-MCNC: 1.1 MG/DL (ref 0.2–1.3)
BILIRUB UR QL STRIP: NEGATIVE
BUN SERPL-MCNC: 14 MG/DL (ref 7–30)
CALCIUM SERPL-MCNC: 10.4 MG/DL (ref 8.5–10.1)
CHLORIDE SERPL-SCNC: 101 MMOL/L (ref 94–109)
CO2 SERPL-SCNC: 25 MMOL/L (ref 20–32)
COLOR UR AUTO: ABNORMAL
CREAT SERPL-MCNC: 0.9 MG/DL (ref 0.66–1.25)
DIFFERENTIAL METHOD BLD: NORMAL
DIFFERENTIAL METHOD BLD: NORMAL
EOSINOPHIL # BLD AUTO: 0.2 10E9/L (ref 0–0.7)
EOSINOPHIL # BLD AUTO: NORMAL 10E9/L (ref 0–0.7)
EOSINOPHIL NFR BLD AUTO: 2.6 %
EOSINOPHIL NFR BLD AUTO: NORMAL %
ERYTHROCYTE [DISTWIDTH] IN BLOOD BY AUTOMATED COUNT: 13.2 % (ref 10–15)
ERYTHROCYTE [DISTWIDTH] IN BLOOD BY AUTOMATED COUNT: NORMAL % (ref 10–15)
GFR SERPL CREATININE-BSD FRML MDRD: 88 ML/MIN/1.7M2
GLUCOSE BLDC GLUCOMTR-MCNC: 152 MG/DL (ref 70–99)
GLUCOSE BLDC GLUCOMTR-MCNC: 298 MG/DL (ref 70–99)
GLUCOSE SERPL-MCNC: 242 MG/DL (ref 70–99)
GLUCOSE UR STRIP-MCNC: >1000 MG/DL
HCT VFR BLD AUTO: 45.1 % (ref 40–53)
HCT VFR BLD AUTO: NORMAL % (ref 40–53)
HGB BLD-MCNC: 15.2 G/DL (ref 13.3–17.7)
HGB BLD-MCNC: NORMAL G/DL (ref 13.3–17.7)
HGB UR QL STRIP: NEGATIVE
IMM GRANULOCYTES # BLD: 0.1 10E9/L (ref 0–0.4)
IMM GRANULOCYTES # BLD: NORMAL 10E9/L (ref 0–0.4)
IMM GRANULOCYTES NFR BLD: 1.4 %
IMM GRANULOCYTES NFR BLD: NORMAL %
KETONES UR STRIP-MCNC: NEGATIVE MG/DL
LEUKOCYTE ESTERASE UR QL STRIP: NEGATIVE
LYMPHOCYTES # BLD AUTO: 2.1 10E9/L (ref 0.8–5.3)
LYMPHOCYTES # BLD AUTO: NORMAL 10E9/L (ref 0.8–5.3)
LYMPHOCYTES NFR BLD AUTO: 36.8 %
LYMPHOCYTES NFR BLD AUTO: NORMAL %
MCH RBC QN AUTO: 31.4 PG (ref 26.5–33)
MCH RBC QN AUTO: NORMAL PG (ref 26.5–33)
MCHC RBC AUTO-ENTMCNC: 33.7 G/DL (ref 31.5–36.5)
MCHC RBC AUTO-ENTMCNC: NORMAL G/DL (ref 31.5–36.5)
MCV RBC AUTO: 93 FL (ref 78–100)
MCV RBC AUTO: NORMAL FL (ref 78–100)
MONOCYTES # BLD AUTO: 0.5 10E9/L (ref 0–1.3)
MONOCYTES # BLD AUTO: NORMAL 10E9/L (ref 0–1.3)
MONOCYTES NFR BLD AUTO: 9 %
MONOCYTES NFR BLD AUTO: NORMAL %
NEUTROPHILS # BLD AUTO: 2.8 10E9/L (ref 1.6–8.3)
NEUTROPHILS # BLD AUTO: NORMAL 10E9/L (ref 1.6–8.3)
NEUTROPHILS NFR BLD AUTO: 49.7 %
NEUTROPHILS NFR BLD AUTO: NORMAL %
NITRATE UR QL: NEGATIVE
NRBC # BLD AUTO: 0 10*3/UL
NRBC # BLD AUTO: NORMAL 10*3/UL
NRBC BLD AUTO-RTO: 0 /100
NRBC BLD AUTO-RTO: NORMAL /100
PH UR STRIP: 5.5 PH (ref 5–7)
PLATELET # BLD AUTO: 242 10E9/L (ref 150–450)
PLATELET # BLD AUTO: NORMAL 10E9/L (ref 150–450)
POTASSIUM SERPL-SCNC: 4.1 MMOL/L (ref 3.4–5.3)
PROT SERPL-MCNC: 8.1 G/DL (ref 6.8–8.8)
RBC # BLD AUTO: 4.84 10E12/L (ref 4.4–5.9)
RBC # BLD AUTO: NORMAL 10E12/L (ref 4.4–5.9)
RBC #/AREA URNS AUTO: <1 /HPF (ref 0–2)
SODIUM SERPL-SCNC: 137 MMOL/L (ref 133–144)
SOURCE: ABNORMAL
SP GR UR STRIP: 1.03 (ref 1–1.03)
SQUAMOUS #/AREA URNS AUTO: <1 /HPF (ref 0–1)
TRANS CELLS #/AREA URNS HPF: <1 /HPF (ref 0–1)
TROPONIN I SERPL-MCNC: <0.015 UG/L (ref 0–0.04)
UROBILINOGEN UR STRIP-MCNC: NORMAL MG/DL (ref 0–2)
WBC # BLD AUTO: 5.7 10E9/L (ref 4–11)
WBC # BLD AUTO: NORMAL 10E9/L (ref 4–11)
WBC #/AREA URNS AUTO: <1 /HPF (ref 0–5)

## 2018-10-30 PROCEDURE — 80053 COMPREHEN METABOLIC PANEL: CPT | Performed by: INTERNAL MEDICINE

## 2018-10-30 PROCEDURE — 96374 THER/PROPH/DIAG INJ IV PUSH: CPT | Performed by: INTERNAL MEDICINE

## 2018-10-30 PROCEDURE — 25000132 ZZH RX MED GY IP 250 OP 250 PS 637: Performed by: INTERNAL MEDICINE

## 2018-10-30 PROCEDURE — 00000146 ZZHCL STATISTIC GLUCOSE BY METER IP

## 2018-10-30 PROCEDURE — 93010 ELECTROCARDIOGRAM REPORT: CPT | Mod: Z6 | Performed by: INTERNAL MEDICINE

## 2018-10-30 PROCEDURE — 99285 EMERGENCY DEPT VISIT HI MDM: CPT | Mod: 25 | Performed by: INTERNAL MEDICINE

## 2018-10-30 PROCEDURE — 84484 ASSAY OF TROPONIN QUANT: CPT | Performed by: INTERNAL MEDICINE

## 2018-10-30 PROCEDURE — 25000128 H RX IP 250 OP 636: Performed by: INTERNAL MEDICINE

## 2018-10-30 PROCEDURE — 96361 HYDRATE IV INFUSION ADD-ON: CPT | Performed by: INTERNAL MEDICINE

## 2018-10-30 PROCEDURE — 81001 URINALYSIS AUTO W/SCOPE: CPT | Performed by: INTERNAL MEDICINE

## 2018-10-30 PROCEDURE — 85025 COMPLETE CBC W/AUTO DIFF WBC: CPT | Performed by: INTERNAL MEDICINE

## 2018-10-30 PROCEDURE — 71046 X-RAY EXAM CHEST 2 VIEWS: CPT

## 2018-10-30 PROCEDURE — 93005 ELECTROCARDIOGRAM TRACING: CPT | Performed by: INTERNAL MEDICINE

## 2018-10-30 RX ADMIN — SODIUM CHLORIDE 1000 ML: 900 INJECTION, SOLUTION INTRAVENOUS at 16:57

## 2018-10-30 RX ADMIN — HUMAN INSULIN 6 UNITS: 100 INJECTION, SOLUTION SUBCUTANEOUS at 16:57

## 2018-10-30 ASSESSMENT — ENCOUNTER SYMPTOMS
NAUSEA: 0
VOMITING: 0

## 2018-10-30 ASSESSMENT — VISUAL ACUITY
OD: 20/40
OS: 20/40

## 2018-10-30 NOTE — ED AVS SNAPSHOT
[facility], Emergency Department    [address] [address]    Phone:  [phone]                                       Andrew Lockwood   MRN: [id number]    Department:  [facility], Emergency Department   Date of Visit:  10/30/2018           Patient Information     Date Of Birth          11/27/1967        Your diagnoses for this visit were:     Hyperglycemia     Heart burn     Type 2 diabetes mellitus without complication, with long-term current use of insulin (H)     Human immunodeficiency virus I infection (H)        You were seen by Zaire Oropeza MD.        Discharge Instructions       Please go back to your regular dose glargin insulin to 50 unit(s) subcutaneous and make an appointment to follow up with Your Primary Care Provider in 3-7 days even if entirely better.     Your next 10 appointments already scheduled     Nov 13, 2018 10:00 AM CST   (Arrive by 9:45 AM)   New Patient Visit with Thanh Lundberg MD   [facility] ([facility] [place])    [address]  [address] [facility] [address] [phone]              24 Hour Appointment Hotline       To make an appointment at any [facility], call [phone] ([phone]). If you don't have a family doctor or clinic, we will help you find one. [place] clinics are conveniently located to serve the needs of you and your family.             Review of your medicines      Our records show that you are taking the medicines listed below. If these are incorrect, please call your family doctor or clinic.        Dose / Directions Last dose taken    bictegravir-emtricitabine-tenofovir -25 MG per tablet   Commonly known as:  BIKTARVY   Dose:  1 tablet   Quantity:  30 tablet        Take 1 tablet by mouth daily   Refills:  11        blood glucose lancets standard   Commonly known as:  no brand specified   Quantity:  100 Box        Use to test blood sugar four times daily or as  directed.   Refills:  5        blood glucose monitoring meter device kit   Commonly known as:  no brand specified   Quantity:  1 kit        Use to test blood sugar 4 times daily or as directed.   Refills:  0        blood glucose monitoring test strip   Commonly known as:  no brand specified   Dose:  1 strip   Quantity:  100 strip        1 strip by In Vitro route 4 times daily (before meals and nightly) Use to test blood sugars 4 times daily or as directed   Refills:  11        calcium carbonate 500 MG chewable tablet   Commonly known as:  TUMS   Dose:  2 chew tab   Quantity:  150 tablet        Take 2 tablets (1,000 mg) by mouth 3 times daily as needed for heartburn   Refills:  3        empagliflozin 10 MG Tabs tablet   Commonly known as:  JARDIANCE   Quantity:  90 tablet        Take 1 tablet by mouth daily   Refills:  3        gabapentin 100 MG capsule   Commonly known as:  NEURONTIN   Dose:  200 mg   Quantity:  90 capsule        Take 2 capsules (200 mg) by mouth 3 times daily   Refills:  0        glycerin (adult) 2 g Supp Suppository   Dose:  1 suppository   Quantity:  5 suppository        Place 1 suppository rectally daily as needed for constipation   Refills:  0        hyoscyamine 0.125 MG Tbdp   Dose:  0.25 mg   Quantity:  60 tablet        Take 2 tablets (0.25 mg) by mouth 4 times daily as needed for cramping   Refills:  0        * insulin glargine 100 UNIT/ML injection   Commonly known as:  LANTUS SOLOSTAR   Dose:  20 Units   Quantity:  15 mL        Inject 20 Units Subcutaneous every morning   Refills:  0        * BASAGLAR 100 UNIT/ML injection   Dose:  20 Units   Quantity:  15 mL        Inject 20 Units Subcutaneous daily   Refills:  0        insulin pen needle 31G X 8 MM   Commonly known as:  B-D U/F   Quantity:  100 each        Use 1 pen needles daily or as directed.   Refills:  0        magnesium oxide 400 MG tablet   Commonly known as:  MAG-OX   Dose:  400 mg   Quantity:  30 tablet        Take 1 tablet (400  mg) by mouth daily   Refills:  0        omeprazole 20 MG CR capsule   Commonly known as:  priLOSEC   Dose:  20 mg   Quantity:  180 capsule        Take 1 capsule (20 mg) by mouth 2 times daily   Refills:  3        ondansetron 4 MG ODT tab   Commonly known as:  ZOFRAN-ODT   Dose:  4 mg   Quantity:  6 tablet        Take 1 tablet (4 mg) by mouth 2 times daily   Refills:  0        ranitidine 150 MG tablet   Commonly known as:  ZANTAC   Dose:  150 mg   Quantity:  60 tablet        Take 1 tablet (150 mg) by mouth 2 times daily   Refills:  3        simethicone 80 MG chewable tablet   Commonly known as:  MYLICON   Dose:  80 mg   Quantity:  30 tablet        Take 1 tablet (80 mg) by mouth 4 times daily   Refills:  0        * Notice:  This list has 2 medication(s) that are the same as other medications prescribed for you. Read the directions carefully, and ask your doctor or other care provider to review them with you.            Procedures and tests performed during your visit     Procedure/Test Number of Times Performed    CBC with platelets differential 2    Cardiac Continuous Monitoring 1    Comprehensive metabolic panel 1    EKG 12-lead, tracing only 1    Glucose by meter 2    Glucose monitor nursing POCT 1    Troponin I 1    UA with Microscopic 1    XR Chest 2 Views 1      Orders Needing Specimen Collection     None      Pending Results     Date and Time Order Name Status Description    10/30/2018 1604 UA with Microscopic In process     10/30/2018 1604 EKG 12-lead, tracing only Preliminary             Pending Culture Results     Date and Time Order Name Status Description    10/30/2018 1604 UA with Microscopic In process             Pending Results Instructions     If you had any lab results that were not finalized at the time of your Discharge, you can call the ED Lab Result RN at [phone]. You will be contacted by this team for any positive Lab results or changes in treatment. The nurses are available 7 days a week  from 10A to 6:30P.  You can leave a message 24 hours per day and they will return your call.        Thank you for choosing [facility]       Thank you for choosing [facility] for your care. Our goal is always to provide you with excellent care. Hearing back from our patients is one way we can continue to improve our services. Please take a few minutes to complete the written survey that you may receive in the mail after you visit with us. Thank you!        [email or web address] Information     [facility] gives you secure access to your electronic health record. If you see a primary care provider, you can also send messages to your care team and make appointments. If you have questions, please call your primary care clinic.  If you do not have a primary care provider, please call [phone] and they will assist you.        Care EveryWhere ID     This is your Care EveryWhere ID. This could be used by other organizations to access your [facility] medical records  [id number]        Equal Access to Services     RA JORDAN : Apollo Cody, maverick centeno, luis enrique reyna, dez moreno . So [facility] [phone].    ATENCIÓN: Si habla español, tiene a kohli disposición servicios gratuitos de asistencia lingüística. Llame al [phone].    We comply with applicable federal civil rights laws and [place] laws. We do not discriminate on the basis of race, color, national origin, age, disability, sex, sexual orientation, or gender identity.            After Visit Summary       This is your record. Keep this with you and show to your community pharmacist(s) and doctor(s) at your next visit.

## 2018-10-30 NOTE — ED AVS SNAPSHOT
[facility], [place], Emergency Department    [address] [place] [address]    Phone:  [phone]                                       Andrew Lockwood   MRN: [id number]    Department:  [facility], Emergency Department   Date of Visit:  10/30/2018           After Visit Summary Signature Page     I have received my discharge instructions, and my questions have been answered. I have discussed any challenges I see with this plan with the nurse or doctor.    ..........................................................................................................................................  Patient/Patient Representative Signature      ..........................................................................................................................................  Patient Representative Print Name and Relationship to Patient    ..................................................               ................................................  Date                                   Time    ..........................................................................................................................................  Reviewed by Signature/Title    ...................................................              ..............................................  Date                                               Time          22EPIC Rev 08/18

## 2018-10-30 NOTE — ED TRIAGE NOTES
Pt arrives to ED with complaints of blurry vision in both eyes, headache and heartburn. Pt believes his blood sugar is elevated. Pt reports his insulin was changed yesterday from 50 units to 25 units. He reports urinating all night. A&O, VSS.

## 2018-10-30 NOTE — ED PROVIDER NOTES
"    [place] EMERGENCY DEPARTMENT ([facility])  10/30/18  History     Chief Complaint   Patient presents with     Eye Problem     Heartburn     Headache     HPI  Andrew Lockwood is a 50 year old male with a medical history significant for HIV/AIDS, type 2 diabetes mellitus, CNS toxoplasmosis, chronic abdominal pain and is s/p appendectomy who presents to the Emergency Department for evaluation of eye problems and hyperglycemia. The patient reports that two nights ago he lost his insulin pen, and today states \"my sugars are high\". The patient claims he tried to get a new insulin pen yesterday and a nurse told him to reduce is dose from 50 units to 25 units. The patient reports he is homeless and \"can't control his sugars\". Patient complains of blurred vision in bilateral eyes. Patient denies nausea.    Per nohemi review, patient was admitted here on 10/05/2018 for acute on chronic abdominal pain, recurrent partial SBO and right forearm skin and soft tissue infection.     Andrew has a history of the above over the last few years and this admission workup found elevated inflammatory markers, negative infectious studies and a CT A/P which demonstrated focal small bowel enteritis and associated wall thickening and proximal dilation concerning for partial SBO. Differential included infectious vs inflammatory vs structural vs endocrine (gastroparesis). Since in the recent past he had an appendectomy c/b abscess and SBO 2/2 adhesions, surgery was consulted upon admission and recommended medical management, as this did not appear to be recurrence of this issue. GI was consulted and recommended an infectious workup, treatment of constipation, consideration of treatment for SIBO and medical management of his constellation of GI symptoms. Primarily with bowel rest without NG decompression his symptoms improved and his inflammation resolved without antimicrobials.    He was seen in ID clinic before admission for the above. " Doxycycline was ordered but not started. The wound was swabbed and the culture isolated MSSA. His wound was draining upon admission, with no sign of abscess. He responded well to doxycycline and topical mupirocin, which he will continue for a 7 day course.    I have reviewed the Medications, Allergies, Past Medical and Surgical History, and Social History in the [facility] system.    Past Medical History:   Diagnosis Date     AIDS (H)      Allergic rhinitis due to other allergen     DNS     Chronic abdominal pain      CNS toxoplasmosis (H)      Diabetes type 2, controlled (H)      GERD (gastroesophageal reflux disease)      HIV (human immunodeficiency virus infection) (H)      Periungual wart      Sleep apnea     doesn't use CPAP       Past Surgical History:   Procedure Laterality Date     C NONSPECIFIC PROCEDURE      right forearm fracture     COLONOSCOPY Left 1/22/2016    Procedure: COMBINED COLONOSCOPY, SINGLE OR MULTIPLE BIOPSY/POLYPECTOMY BY BIOPSY;  Surgeon: Clark Saini MD;  Location: [facility] GI     HC EXPLORE UNDESC TESTIS,INGUIN/SCROTAL       LAPAROSCOPIC APPENDECTOMY N/A 1/31/2018    Procedure: LAPAROSCOPIC APPENDECTOMY;  LAPAROSCOPIC APPENDECTOMY;  Surgeon: Dawn Holt MD;  Location: [facility] OR     LAPAROSCOPY DIAGNOSTIC (GENERAL) N/A 7/26/2016    Procedure: LAPAROSCOPY DIAGNOSTIC (GENERAL);  Surgeon: Susannah Arriaga MD;  Location: [facility] OR     LAPAROSCOPY DIAGNOSTIC (GENERAL) N/A 4/16/2018    Procedure: LAPAROSCOPY DIAGNOSTIC (GENERAL);  Diagnostic laparoscopy and lysis of adhesions;  Surgeon: Prince Dowling MD;  Location: [facility] OR     OPTICAL TRACKING SYSTEM CRANIOTOMY, EXCISE TUMOR, COMBINED Left 4/10/2015    Procedure: COMBINED OPTICAL TRACKING SYSTEM CRANIOTOMY, EXCISE TUMOR;  Surgeon: Mirlande Colmenares MD;  Location: [facility] OR     REPAIR GAMEKEEPER'S THUMB Right 12/2/2016    Procedure: REPAIR LIGAMENT ULNAR COLLATERAL THUMB (GAMEKEEPER'S);  Surgeon: Evin Zamorano MD;  Location:  [facility] OR       Family History   Problem Relation Age of Onset     Diabetes Brother      Diabetes Father      Alzheimer Disease Father      Unknown/Adopted Mother      Diabetes Paternal Grandfather      Cancer No family hx of      no skin cancer     Skin Cancer No family hx of      no famiy hx of skin cancer     Glaucoma No family hx of      Macular Degeneration No family hx of        Social History   Substance Use Topics     Smoking status: Current Every Day Smoker     Packs/day: 0.50     Types: Cigarettes     Last attempt to quit: 10/28/2016     Smokeless tobacco: Former User      Comment: quit Aug 2018     Alcohol use No      Comment: Last etoh in 2007       No current facility-administered medications for this encounter.      Current Outpatient Prescriptions   Medication     BASAGLAR 100 UNIT/ML injection     Bictegravir-Emtricitab-Tenofov -25 MG TABS     blood glucose (NO BRAND SPECIFIED) lancets standard     blood glucose monitoring (NO BRAND SPECIFIED) meter device kit     blood glucose monitoring (NO BRAND SPECIFIED) test strip     calcium carbonate (TUMS) 500 MG chewable tablet     empagliflozin (JARDIANCE) 10 MG TABS tablet     gabapentin (NEURONTIN) 100 MG capsule     glycerin, adult, 2 g SUPP Suppository     hyoscyamine 0.125 MG TBDP     insulin glargine (LANTUS SOLOSTAR) 100 UNIT/ML pen     insulin pen needle (B-D U/F) 31G X 8 MM     magnesium oxide (MAG-OX) 400 MG tablet     omeprazole (PRILOSEC) 20 MG CR capsule     ondansetron (ZOFRAN-ODT) 4 MG ODT tab     ranitidine (ZANTAC) 150 MG tablet     simethicone (MYLICON) 80 MG chewable tablet        Allergies   Allergen Reactions     Metformin      Abdominal pain     Milk [Lac Bovis] Hives     Tylenol [Acetaminophen] Itching     Dulaglutide Rash         Review of Systems   Eyes: Positive for visual disturbance (bilateral blurry).   Gastrointestinal: Negative for nausea and vomiting.   All other systems reviewed and are negative.      Physical Exam   BP:  "131/82  Pulse: 110  Heart Rate: 89  Temp: 98.4  F (36.9  C)  Resp: 18  Height: 162.6 cm (5' 4\")  Weight: 75.6 kg (166 lb 11.2 oz)  SpO2: 100 %  Visual Acuity-Left: 20/40  Visual Acuity-Right: 20/40      Physical Exam   Constitutional: He is oriented to person, place, and time. No distress.   HENT:   Head: Atraumatic.   Mouth/Throat: Oropharynx is clear and moist. No oropharyngeal exudate.   Eyes: Pupils are equal, round, and reactive to light. No scleral icterus.   Neck: Neck supple. No JVD present.   Cardiovascular: Regular rhythm, normal heart sounds and intact distal pulses.  Tachycardia present.  Exam reveals no gallop.    No murmur heard.  Pulmonary/Chest: Breath sounds normal. No respiratory distress. He has no wheezes. He has no rales. He exhibits no tenderness.   Abdominal: Soft. Bowel sounds are normal. He exhibits no distension and no mass. There is no tenderness. There is no rebound and no guarding.   Musculoskeletal: He exhibits no edema or tenderness.   Neurological: He is alert and oriented to person, place, and time. No cranial nerve deficit. Coordination normal.   Skin: Skin is warm. No rash noted. He is not diaphoretic.       ED Course     ED Course     Procedures             EKG Interpretation:      Interpreted by Zaire Oropeza MD  Time reviewed: 16:09  Symptoms at time of EKG: None   Rhythm: sinus tachycardia  Rate: 104 bpm  Axis: Normal  Ectopy: none  Conduction: normal  ST Segments/ T Waves: No ST-T wave changes and No acute ischemic changes  Q Waves: none  Comparison to prior: Unchanged from 02/18/2018    Clinical Impression: no acute changes      Results for orders placed or performed during the hospital encounter of 10/30/18 (from the past 24 hour(s))   Glucose by meter     Status: Abnormal    Collection Time: 10/30/18  3:01 PM   Result Value Ref Range    Glucose 298 (H) 70 - 99 mg/dL   EKG 12-lead, tracing only     Status: None (Preliminary result)    Collection Time: 10/30/18  4:09 PM "   Result Value Ref Range    Interpretation ECG Click View Image link to view waveform and result    CBC with platelets differential     Status: None    Collection Time: 10/30/18  4:40 PM   Result Value Ref Range    WBC Canceled, Test credited 4.0 - 11.0 10e9/L    RBC Count Canceled, Test credited 4.4 - 5.9 10e12/L    Hemoglobin Canceled, Test credited 13.3 - 17.7 g/dL    Hematocrit Canceled, Test credited 40.0 - 53.0 %    MCV Canceled, Test credited 78 - 100 fl    MCH Canceled, Test credited 26.5 - 33.0 pg    MCHC Canceled, Test credited 31.5 - 36.5 g/dL    RDW Canceled, Test credited 10.0 - 15.0 %    Platelet Count Canceled, Test credited 150 - 450 10e9/L    Diff Method Canceled, Test credited     % Neutrophils Canceled, Test credited %    % Lymphocytes Canceled, Test credited %    % Monocytes Canceled, Test credited %    % Eosinophils Canceled, Test credited %    % Basophils Canceled, Test credited %    % Immature Granulocytes Canceled, Test credited %    Nucleated RBCs Canceled, Test credited 0 /100    Absolute Neutrophil Canceled, Test credited 1.6 - 8.3 10e9/L    Absolute Lymphocytes Canceled, Test credited 0.8 - 5.3 10e9/L    Absolute Monocytes Canceled, Test credited 0.0 - 1.3 10e9/L    Absolute Eosinophils Canceled, Test credited 0.0 - 0.7 10e9/L    Absolute Basophils Canceled, Test credited 0.0 - 0.2 10e9/L    Abs Immature Granulocytes Canceled, Test credited 0 - 0.4 10e9/L    Absolute Nucleated RBC Canceled, Test credited    Comprehensive metabolic panel     Status: Abnormal    Collection Time: 10/30/18  4:40 PM   Result Value Ref Range    Sodium 137 133 - 144 mmol/L    Potassium 4.1 3.4 - 5.3 mmol/L    Chloride 101 94 - 109 mmol/L    Carbon Dioxide 25 20 - 32 mmol/L    Anion Gap 10 3 - 14 mmol/L    Glucose 242 (H) 70 - 99 mg/dL    Urea Nitrogen 14 7 - 30 mg/dL    Creatinine 0.90 0.66 - 1.25 mg/dL    GFR Estimate 88 >60 mL/min/1.7m2    GFR Estimate If Black >90 >60 mL/min/1.7m2    Calcium 10.4 (H) 8.5 -  10.1 mg/dL    Bilirubin Total 1.1 0.2 - 1.3 mg/dL    Albumin 4.1 3.4 - 5.0 g/dL    Protein Total 8.1 6.8 - 8.8 g/dL    Alkaline Phosphatase 129 40 - 150 U/L    ALT 34 0 - 70 U/L    AST 32 0 - 45 U/L   Troponin I     Status: None    Collection Time: 10/30/18  4:40 PM   Result Value Ref Range    Troponin I ES <0.015 0.000 - 0.045 ug/L   XR Chest 2 Views     Status: None    Collection Time: 10/30/18  5:20 PM    Narrative    Exam: XR CHEST 2 VW 10/30/2018 5:20 PM    History: Chest pain    Comparison: 5/10/2018    Findings: PA and lateral views of the chest were evaluated. Cardiac  mediastinal silhouette and pulmonary vascular structures are within  normal limits. Lung volumes are normal. No focal pulmonary opacity. No  pleural effusion or pneumothorax. Visualized upper abdomen is  unremarkable. No acute bony normality.      Impression    Impression: No findings to account for symptoms of chest pain.    ALEJANDRINA MARS MD   CBC with platelets differential     Status: None    Collection Time: 10/30/18  5:22 PM   Result Value Ref Range    WBC 5.7 4.0 - 11.0 10e9/L    RBC Count 4.84 4.4 - 5.9 10e12/L    Hemoglobin 15.2 13.3 - 17.7 g/dL    Hematocrit 45.1 40.0 - 53.0 %    MCV 93 78 - 100 fl    MCH 31.4 26.5 - 33.0 pg    MCHC 33.7 31.5 - 36.5 g/dL    RDW 13.2 10.0 - 15.0 %    Platelet Count 242 150 - 450 10e9/L    Diff Method Automated Method     % Neutrophils 49.7 %    % Lymphocytes 36.8 %    % Monocytes 9.0 %    % Eosinophils 2.6 %    % Basophils 0.5 %    % Immature Granulocytes 1.4 %    Nucleated RBCs 0 0 /100    Absolute Neutrophil 2.8 1.6 - 8.3 10e9/L    Absolute Lymphocytes 2.1 0.8 - 5.3 10e9/L    Absolute Monocytes 0.5 0.0 - 1.3 10e9/L    Absolute Eosinophils 0.2 0.0 - 0.7 10e9/L    Absolute Basophils 0.0 0.0 - 0.2 10e9/L    Abs Immature Granulocytes 0.1 0 - 0.4 10e9/L    Absolute Nucleated RBC 0.0    Glucose by meter     Status: Abnormal    Collection Time: 10/30/18  6:03 PM   Result Value Ref Range    Glucose 152 (H)  70 - 99 mg/dL   UA with Microscopic     Status: Abnormal    Collection Time: 10/30/18  6:10 PM   Result Value Ref Range    Color Urine Light Yellow     Appearance Urine Clear     Glucose Urine >1000 (A) NEG^Negative mg/dL    Bilirubin Urine Negative NEG^Negative    Ketones Urine Negative NEG^Negative mg/dL    Specific Gravity Urine 1.032 1.003 - 1.035    Blood Urine Negative NEG^Negative    pH Urine 5.5 5.0 - 7.0 pH    Protein Albumin Urine Negative NEG^Negative mg/dL    Urobilinogen mg/dL Normal 0.0 - 2.0 mg/dL    Nitrite Urine Negative NEG^Negative    Leukocyte Esterase Urine Negative NEG^Negative    Source Midstream Urine     WBC Urine <1 0 - 5 /HPF    RBC Urine <1 0 - 2 /HPF    Squamous Epithelial /HPF Urine <1 0 - 1 /HPF    Transitional Epi <1 0 - 1 /HPF           Labs Ordered and Resulted from Time of ED Arrival Up to the Time of Departure from the ED   GLUCOSE BY METER - Abnormal; Notable for the following:        Result Value    Glucose 298 (*)     All other components within normal limits   COMPREHENSIVE METABOLIC PANEL - Abnormal; Notable for the following:     Glucose 242 (*)     Calcium 10.4 (*)     All other components within normal limits   GLUCOSE BY METER - Abnormal; Notable for the following:     Glucose 152 (*)     All other components within normal limits   CBC WITH PLATELETS DIFFERENTIAL   TROPONIN I   CBC WITH PLATELETS DIFFERENTIAL   GLUCOSE MONITOR NURSING POCT   CARDIAC CONTINUOUS MONITORING            Assessments & Plan (with Medical Decision Making)  Hyperglycemia with blurry vision and polyuria, tachycardia, due to glargin insulin to 25 from 50 when he had SBO but never put him back to 50 his usual dose-never had hypoglycemia with this dose, will instruct him to restart his usual dose of 50.  Heart burn. EKG and trop neg, follow up with his PMD in one week.       I have reviewed the nursing notes.    I have reviewed the findings, diagnosis, plan and need for follow up with the  patient.    Discharge Medication List as of 10/30/2018  6:46 PM          Final diagnoses:   Hyperglycemia   Heart burn   Type 2 diabetes mellitus without complication, with long-term current use of insulin (H)   Human immunodeficiency virus I infection (H)   Torres KELLY, am serving as a trained medical scribe to document services personally performed by Zaire Oropeza MD, based on the provider's statements to me.      Zaire KELLY MD, was physically present and have reviewed and verified the accuracy of this note documented by Torres Thrasher.       10/30/2018   [facility], [place], EMERGENCY DEPARTMENT     Zaire Oropeza MD  10/30/18 [id number]

## 2018-10-31 LAB — INTERPRETATION ECG - MUSE: NORMAL

## 2018-11-02 ENCOUNTER — HOSPITAL ENCOUNTER (EMERGENCY)
Facility: CLINIC | Age: 53
Discharge: HOME | End: 2018-11-02
Attending: EMERGENCY MEDICINE | Admitting: EMERGENCY MEDICINE
Payer: COMMERCIAL

## 2018-11-02 VITALS
TEMPERATURE: 98.6 F | SYSTOLIC BLOOD PRESSURE: 150 MMHG | RESPIRATION RATE: 18 BRPM | OXYGEN SATURATION: 99 % | DIASTOLIC BLOOD PRESSURE: 80 MMHG | HEART RATE: 90 BPM

## 2018-11-02 DIAGNOSIS — J02.9 ACUTE PHARYNGITIS, UNSPECIFIED ETIOLOGY: ICD-10-CM

## 2018-11-02 PROCEDURE — 25000131 ZZH RX MED GY IP 250 OP 636 PS 637: Performed by: EMERGENCY MEDICINE

## 2018-11-02 PROCEDURE — 99283 EMERGENCY DEPT VISIT LOW MDM: CPT | Mod: Z6 | Performed by: EMERGENCY MEDICINE

## 2018-11-02 PROCEDURE — 99283 EMERGENCY DEPT VISIT LOW MDM: CPT | Performed by: EMERGENCY MEDICINE

## 2018-11-02 RX ORDER — IBUPROFEN 200 MG
400 TABLET ORAL EVERY 8 HOURS PRN
Qty: 60 TABLET | Refills: 0 | Status: SHIPPED | OUTPATIENT
Start: 2018-11-02 | End: 2019-04-15

## 2018-11-02 RX ADMIN — DEXAMETHASONE 10 MG: 2 TABLET ORAL at 12:59

## 2018-11-02 ASSESSMENT — ENCOUNTER SYMPTOMS
COUGH: 0
FEVER: 0
SORE THROAT: 1
TROUBLE SWALLOWING: 1

## 2018-11-02 NOTE — ED AVS SNAPSHOT
[facility], [place], Emergency Department    [address] [place] [address]    Phone:  [phone]                                       Andrew Lockwood   MRN: [id number]    Department:  [facility], Emergency Department   Date of Visit:  11/2/2018           After Visit Summary Signature Page     I have received my discharge instructions, and my questions have been answered. I have discussed any challenges I see with this plan with the nurse or doctor.    ..........................................................................................................................................  Patient/Patient Representative Signature      ..........................................................................................................................................  Patient Representative Print Name and Relationship to Patient    ..................................................               ................................................  Date                                   Time    ..........................................................................................................................................  Reviewed by Signature/Title    ...................................................              ..............................................  Date                                               Time          22EPIC Rev 08/18

## 2018-11-02 NOTE — DISCHARGE INSTRUCTIONS
Please make an appointment to follow up with Infectious Disease Clinic (phone: [phone]) as soon as possible even if entirely better.      Self-Care for Sore Throats    Sore throats happen for many reasons, such as colds, allergies, and infections caused by viruses or bacteria. In any case, your throat becomes red and sore. Your goal for self-care is to reduce your discomfort while giving your throat a chance to heal.  Moisten and soothe your throat  Tips include the following:    Try a sip of water first thing after waking up.    Keep your throat moist by drinking 6 or more glasses of clear liquids every day.    Run a cool-air humidifier in your room overnight.    Avoid cigarette smoke.     Suck on throat lozenges, cough drops, hard candy, ice chips, or frozen fruit-juice bars. Use the sugar-free versions if your diet or medical condition requires them.  Gargle to ease irritation  Gargling every hour or 2 can ease irritation. Try gargling with 1 of these solutions:    1/4 teaspoon of salt in 1/2 cup of warm water    An over-the-counter anesthetic gargle  Use medicine for more relief  Over-the-counter medicine can reduce sore throat symptoms. Ask your pharmacist if you have questions about which medicine to use:    Ease pain with anesthetic sprays. Aspirin or an aspirin substitute also helps. Remember, never give aspirin to anyone 18 or younger, or if you are already taking blood thinners.     For sore throats caused by allergies, try antihistamines to block the allergic reaction.    Remember: unless a sore throat is caused by a bacterial infection, antibiotics won t help you.  Prevent future sore throats  Prevention tips include the following:    Stop smoking or reduce contact with secondhand smoke. Smoke irritates the tender throat lining.    Limit contact with pets and with allergy-causing substances, such as pollen and mold.    When you re around someone with a sore throat or cold, wash your hands often to  keep viruses or bacteria from spreading.    Don t strain your vocal cords.  Call your healthcare provider  Contact your healthcare provider if you have:    A temperature over 101 F (38.3 C)    White spots on the throat    Great difficulty swallowing    Trouble breathing    A skin rash    Recent exposure to someone else with strep bacteria    Severe hoarseness and swollen glands in the neck or jaw   Date Last Reviewed: 8/1/2016 2000-2017 [employer]. [address] [place] [address]. All rights reserved. This information is not intended as a substitute for professional medical care. Always follow your healthcare professional's instructions.      If Andrew has discomfort from fever or other pain, he can have:  Acetaminophen (Tylenol) every 6 hours as needed. His dose is:    2 regular strength tabs (650 mg)                                     (43.2+ kg/96+ lb)    NOTE: If your acetaminophen (Tylenol) came with a dropper marked with 0.4 and 0.8 ml, call us ([phone]) or check with your doctor about the dose before using it.     AND/OR      Ibuprofen (Advil, Motrin) every 6 hours as needed. His/her dose is:    3 regular strength tabs (600 mg)                                                                         (60-80 kg/132-176 lb)

## 2018-11-02 NOTE — ED PROVIDER NOTES
History     Chief Complaint   Patient presents with     Pharyngitis     HPI  Andrew Lockwood is a 50 year old homeless male with a history of HIV, DM2, CNS toxoplasmosis, who presents to the Emergency Department for the evaluation of pharyngitis. Patient states that since last night he has had a sore throat making it hard to drink water. Patient also complains of frequent urination. Patient states he is breathing normally and denies fever, cough. Patient denies drug or alcohol use. He took ibuprofen this morning. Patient had a absolute CD4 of 317 on 8/15/2018    I have reviewed the Medications, Allergies, Past Medical and Surgical History, and Social History in the [employer] system.  Past Medical History:   Diagnosis Date     AIDS (H)      Allergic rhinitis due to other allergen     DNS     Chronic abdominal pain      CNS toxoplasmosis (H)      Diabetes type 2, controlled (H)      GERD (gastroesophageal reflux disease)      HIV (human immunodeficiency virus infection) (H)      Periungual wart      Sleep apnea     doesn't use CPAP       Past Surgical History:   Procedure Laterality Date     C NONSPECIFIC PROCEDURE      right forearm fracture     COLONOSCOPY Left 1/22/2016    Procedure: COMBINED COLONOSCOPY, SINGLE OR MULTIPLE BIOPSY/POLYPECTOMY BY BIOPSY;  Surgeon: Clark Saini MD;  Location:  GI     HC EXPLORE UNDESC TESTIS,INGUIN/SCROTAL       LAPAROSCOPIC APPENDECTOMY N/A 1/31/2018    Procedure: LAPAROSCOPIC APPENDECTOMY;  LAPAROSCOPIC APPENDECTOMY;  Surgeon: Dawn Holt MD;  Location: [facility] OR     LAPAROSCOPY DIAGNOSTIC (GENERAL) N/A 7/26/2016    Procedure: LAPAROSCOPY DIAGNOSTIC (GENERAL);  Surgeon: Susannah Arriaga MD;  [facility] OR     LAPAROSCOPY DIAGNOSTIC (GENERAL) N/A 4/16/2018    Procedure: LAPAROSCOPY DIAGNOSTIC (GENERAL);  Diagnostic laparoscopy and lysis of adhesions;  Surgeon: Prince Dowling MD;  Location:  OR     OPTICAL TRACKING SYSTEM CRANIOTOMY, EXCISE TUMOR,  COMBINED Left 4/10/2015    Procedure: COMBINED OPTICAL TRACKING SYSTEM CRANIOTOMY, EXCISE TUMOR;  Surgeon: Mirlande Colmenares MD;  Location: [facility] OR     REPAIR GAMEKEEPER'S THUMB Right 12/2/2016    Procedure: REPAIR LIGAMENT ULNAR COLLATERAL THUMB (GAMEKEEPER'S);  Surgeon: Evin Zamorano MD;  Location: [facility] OR       Family History   Problem Relation Age of Onset     Diabetes Brother      Diabetes Father      Alzheimer Disease Father      Unknown/Adopted Mother      Diabetes Paternal Grandfather      Cancer No family hx of      no skin cancer     Skin Cancer No family hx of      no famiy hx of skin cancer     Glaucoma No family hx of      Macular Degeneration No family hx of        Social History   Substance Use Topics     Smoking status: Current Every Day Smoker     Packs/day: 0.50     Types: Cigarettes     Last attempt to quit: 10/28/2016     Smokeless tobacco: Former User      Comment: quit Aug 2018     Alcohol use No      Comment: Last etoh in 2007       Current Facility-Administered Medications   Medication     dexamethasone (DECADRON) tablet 10 mg     Current Outpatient Prescriptions   Medication     BASAGLAR 100 UNIT/ML injection     empagliflozin (JARDIANCE) 10 MG TABS tablet     gabapentin (NEURONTIN) 100 MG capsule     insulin glargine (LANTUS SOLOSTAR) 100 UNIT/ML pen     Bictegravir-Emtricitab-Tenofov -25 MG TABS     blood glucose (NO BRAND SPECIFIED) lancets standard     blood glucose monitoring (NO BRAND SPECIFIED) meter device kit     blood glucose monitoring (NO BRAND SPECIFIED) test strip     calcium carbonate (TUMS) 500 MG chewable tablet     glycerin, adult, 2 g SUPP Suppository     hyoscyamine 0.125 MG TBDP     insulin pen needle (B-D U/F) 31G X 8 MM     magnesium oxide (MAG-OX) 400 MG tablet     omeprazole (PRILOSEC) 20 MG CR capsule     ondansetron (ZOFRAN-ODT) 4 MG ODT tab     ranitidine (ZANTAC) 150 MG tablet     simethicone (MYLICON) 80 MG chewable tablet        Allergies    Allergen Reactions     Metformin      Abdominal pain     Milk [Lac Bovis] Hives     Tylenol [Acetaminophen] Itching     Dulaglutide Rash       Review of Systems   Constitutional: Negative for fever.   HENT: Positive for sore throat and trouble swallowing.    Respiratory: Negative for cough.        Physical Exam   BP: 147/80  Heart Rate: 96  Temp: 98.6  F (37  C)  Resp: 16  SpO2: 99 %      Physical Exam  Physical Exam   Constitutional: oriented to person, place, and time. appears well-developed and well-nourished.   HENT:   Head: Normocephalic and atraumatic.   Floor of mouth is soft, tongue is not raised.  Neck: Normal range of motion.   No anterior cervical lymphadenopathy.  No stridor.  Posterior pharynx unremarkable without exudates.  Uvula midline.  No peritonsillar exudates or swelling.  No white or fungal appearing lesions in the pharynx.  Pulmonary/Chest: Effort normal. No respiratory distress.   Cardiac: No murmurs, rubs, gallops. RRR.  Abdominal: Abdomen soft, nontender, nondistended. No rebound tenderness.  MSK: Long bones without deformity or evidence of trauma  Neurological: alert and oriented to person, place, and time.   Skin: Skin is warm and dry.   Psychiatric:  normal mood and affect.  behavior is normal. Thought content normal.     ED Course   12:31 PM  The patient was seen and examined by Venu Wolff MD in Room 20.     ED Course     Procedures        None    Assessments & Plan (with Medical Decision Making)   MDM  Patient presenting with pharyngitis.  Recent CD4 count adequate.  Patient has been taking his HIV medications.  Patient here appears well, nontoxic.  Patient is very tired but did not sleep last night as he is homeless.  I do not see candidal esophagitis due to lack of lesions in the posterior pharynx.  Patient has had runny nose and mild cough, most likely this is viral.  Decadron given in the emergency department.  Recommended Tylenol and ibuprofen.  Recommended following up with  infectious disease HIV team.  Patient agrees with this plan.  Low suspicion for CMV due to adequate CD4.  Unlikely strep pharyngitis due to cough, afebrile, no lymphadenopathy, no exudates.    I have reviewed the nursing notes.    I have reviewed the findings, diagnosis, plan and need for follow up with the patient.    New Prescriptions    No medications on file       Final diagnoses:   Pharyngitis     IVenu, am serving as a trained medical scribe to document services personally performed by Venu Wolff MD, based on the provider's statements to me.   IVenu MD, was physically present and have reviewed and verified the accuracy of this note documented by Venu Mercedes.    11/2/2018   [facility], [place], EMERGENCY DEPARTMENT     Venu Wolff MD  11/02/18 124

## 2018-11-02 NOTE — ED AVS SNAPSHOT
[facility], Emergency Department    [address] [address]    Phone:  [phone]                                       Andrew Lockwood   MRN: [id number]    Department:  [facility], Emergency Department   Date of Visit:  11/2/2018           Patient Information     Date Of Birth          11/27/1967        Your diagnoses for this visit were:     Acute pharyngitis, unspecified etiology        You were seen by Venu Wolff MD.        Discharge Instructions       Please make an appointment to follow up with Infectious Disease Clinic (phone: [phone]) as soon as possible even if entirely better.      Self-Care for Sore Throats    Sore throats happen for many reasons, such as colds, allergies, and infections caused by viruses or bacteria. In any case, your throat becomes red and sore. Your goal for self-care is to reduce your discomfort while giving your throat a chance to heal.  Moisten and soothe your throat  Tips include the following:    Try a sip of water first thing after waking up.    Keep your throat moist by drinking 6 or more glasses of clear liquids every day.    Run a cool-air humidifier in your room overnight.    Avoid cigarette smoke.     Suck on throat lozenges, cough drops, hard candy, ice chips, or frozen fruit-juice bars. Use the sugar-free versions if your diet or medical condition requires them.  Gargle to ease irritation  Gargling every hour or 2 can ease irritation. Try gargling with 1 of these solutions:    1/4 teaspoon of salt in 1/2 cup of warm water    An over-the-counter anesthetic gargle  Use medicine for more relief  Over-the-counter medicine can reduce sore throat symptoms. Ask your pharmacist if you have questions about which medicine to use:    Ease pain with anesthetic sprays. Aspirin or an aspirin substitute also helps. Remember, never give aspirin to anyone 18 or younger, or if you are already taking blood thinners.     For sore throats caused by  allergies, try antihistamines to block the allergic reaction.    Remember: unless a sore throat is caused by a bacterial infection, antibiotics won t help you.  Prevent future sore throats  Prevention tips include the following:    Stop smoking or reduce contact with secondhand smoke. Smoke irritates the tender throat lining.    Limit contact with pets and with allergy-causing substances, such as pollen and mold.    When you re around someone with a sore throat or cold, wash your hands often to keep viruses or bacteria from spreading.    Don t strain your vocal cords.  Call your healthcare provider  Contact your healthcare provider if you have:    A temperature over 101 F (38.3 C)    White spots on the throat    Great difficulty swallowing    Trouble breathing    A skin rash    Recent exposure to someone else with strep bacteria    Severe hoarseness and swollen glands in the neck or jaw   Date Last Reviewed: 8/1/2016 2000-2017 [employer]. [address] [place] [address]. All rights reserved. This information is not intended as a substitute for professional medical care. Always follow your healthcare professional's instructions.      If Andrew has discomfort from fever or other pain, he can have:  Acetaminophen (Tylenol) every 6 hours as needed. His dose is:    2 regular strength tabs (650 mg)                                     (43.2+ kg/96+ lb)    NOTE: If your acetaminophen (Tylenol) came with a dropper marked with 0.4 and 0.8 ml, call us ([phone]) or check with your doctor about the dose before using it.     AND/OR      Ibuprofen (Advil, Motrin) every 6 hours as needed. His/her dose is:    3 regular strength tabs (600 mg)                                                                         (60-80 kg/132-176 lb)          Your next 10 appointments already scheduled     Nov 13, 2018 10:00 AM CST   (Arrive by 9:45 AM)   New Patient Visit with Thanh Lundberg MD   [facility]  Rectal [facility] ([facility])    [address]  4th Floor  [facility] [address] [phone]              24 Hour Appointment Hotline       To make an appointment at any [facility], call [phone] ([phone]). If you don't have a family doctor or clinic, we will help you find one. [facility] are conveniently located to serve the needs of you and your family.             Review of your medicines      START taking        Dose / Directions Last dose taken    ibuprofen 200 MG tablet   Commonly known as:  ADVIL/MOTRIN   Dose:  400 mg   Quantity:  60 tablet        Take 2 tablets (400 mg) by mouth every 8 hours as needed for pain   Refills:  0          Our records show that you are taking the medicines listed below. If these are incorrect, please call your family doctor or clinic.        Dose / Directions Last dose taken    bictegravir-emtricitabine-tenofovir -25 MG per tablet   Commonly known as:  BIKTARVY   Dose:  1 tablet   Quantity:  30 tablet        Take 1 tablet by mouth daily   Refills:  11        blood glucose lancets standard   Commonly known as:  no brand specified   Quantity:  100 Box        Use to test blood sugar four times daily or as directed.   Refills:  5        blood glucose monitoring meter device kit   Commonly known as:  no brand specified   Quantity:  1 kit        Use to test blood sugar 4 times daily or as directed.   Refills:  0        blood glucose monitoring test strip   Commonly known as:  no brand specified   Dose:  1 strip   Quantity:  100 strip        1 strip by In Vitro route 4 times daily (before meals and nightly) Use to test blood sugars 4 times daily or as directed   Refills:  11        calcium carbonate 500 MG chewable tablet   Commonly known as:  TUMS   Dose:  2 chew tab   Quantity:  150 tablet        Take 2 tablets (1,000 mg) by mouth 3 times daily as needed for heartburn   Refills:  3        empagliflozin 10 MG Tabs  tablet   Commonly known as:  JARDIANCE   Quantity:  90 tablet        Take 1 tablet by mouth daily   Refills:  3        gabapentin 100 MG capsule   Commonly known as:  NEURONTIN   Dose:  200 mg   Quantity:  90 capsule        Take 2 capsules (200 mg) by mouth 3 times daily   Refills:  0        glycerin (adult) 2 g Supp Suppository   Dose:  1 suppository   Quantity:  5 suppository        Place 1 suppository rectally daily as needed for constipation   Refills:  0        hyoscyamine 0.125 MG Tbdp   Dose:  0.25 mg   Quantity:  60 tablet        Take 2 tablets (0.25 mg) by mouth 4 times daily as needed for cramping   Refills:  0        * insulin glargine 100 UNIT/ML injection   Commonly known as:  LANTUS SOLOSTAR   Dose:  20 Units   Quantity:  15 mL        Inject 20 Units Subcutaneous every morning   Refills:  0        * BASAGLAR 100 UNIT/ML injection   Dose:  20 Units   Quantity:  15 mL        Inject 20 Units Subcutaneous daily   Refills:  0        insulin pen needle 31G X 8 MM   Commonly known as:  B-D U/F   Quantity:  100 each        Use 1 pen needles daily or as directed.   Refills:  0        magnesium oxide 400 MG tablet   Commonly known as:  MAG-OX   Dose:  400 mg   Quantity:  30 tablet        Take 1 tablet (400 mg) by mouth daily   Refills:  0        omeprazole 20 MG CR capsule   Commonly known as:  priLOSEC   Dose:  20 mg   Quantity:  180 capsule        Take 1 capsule (20 mg) by mouth 2 times daily   Refills:  3        ondansetron 4 MG ODT tab   Commonly known as:  ZOFRAN-ODT   Dose:  4 mg   Quantity:  6 tablet        Take 1 tablet (4 mg) by mouth 2 times daily   Refills:  0        ranitidine 150 MG tablet   Commonly known as:  ZANTAC   Dose:  150 mg   Quantity:  60 tablet        Take 1 tablet (150 mg) by mouth 2 times daily   Refills:  3        simethicone 80 MG chewable tablet   Commonly known as:  MYLICON   Dose:  80 mg   Quantity:  30 tablet        Take 1 tablet (80 mg) by mouth 4 times daily   Refills:  0         * Notice:  This list has 2 medication(s) that are the same as other medications prescribed for you. Read the directions carefully, and ask your doctor or other care provider to review them with you.            Prescriptions were sent or printed at these locations (1 Prescription)                   Other Prescriptions                Printed at Department/Unit printer (1 of 1)         ibuprofen (ADVIL/MOTRIN) 200 MG tablet                Orders Needing Specimen Collection     None      Pending Results     No orders found from 10/31/2018 to 11/3/2018.            Pending Culture Results     No orders found from 10/31/2018 to 11/3/2018.            Pending Results Instructions     If you had any lab results that were not finalized at the time of your Discharge, you can call the ED Lab Result RN at [phone]. You will be contacted by this team for any positive Lab results or changes in treatment. The nurses are available 7 days a week from 10A to 6:30P.  You can leave a message 24 hours per day and they will return your call.        Thank you for choosing [facility]       Thank you for choosing [facility] for your care. Our goal is always to provide you with excellent care. Hearing back from our patients is one way we can continue to improve our services. Please take a few minutes to complete the written survey that you may receive in the mail after you visit with us. Thank you!        [employer] Information     [employer] gives you secure access to your electronic health record. If you see a primary care provider, you can also send messages to your care team and make appointments. If you have questions, please call your primary care clinic.  If you do not have a primary care provider, please call [phone] and they will assist you.        Care EveryWhere ID     This is your Care EveryWhere ID. This could be used by other organizations to access your [facility] medical records  [id number]        Equal Access to Services      RA JORDAN : Hadii carissa Cody, waaxda luqadaha, qaybta kaalmada axel, dez salazar. So [facility] [phone].    ATENCIÓN: Si habla español, tiene a kohli disposición servicios gratuitos de asistencia lingüística. Llame al [phone].    We comply with applicable federal civil rights laws and [place] laws. We do not discriminate on the basis of race, color, national origin, age, disability, sex, sexual orientation, or gender identity.            After Visit Summary       This is your record. Keep this with you and show to your community pharmacist(s) and doctor(s) at your next visit.

## 2018-11-02 NOTE — ED TRIAGE NOTES
Pt comes in with a sore throat, cough, and nasal congestion since last night. Alert and oriented, vss.

## 2018-11-04 ENCOUNTER — HOSPITAL ENCOUNTER (EMERGENCY)
Facility: CLINIC | Age: 53
Discharge: HOME | End: 2018-11-04
Attending: EMERGENCY MEDICINE | Admitting: EMERGENCY MEDICINE
Payer: COMMERCIAL

## 2018-11-04 VITALS
DIASTOLIC BLOOD PRESSURE: 62 MMHG | RESPIRATION RATE: 18 BRPM | SYSTOLIC BLOOD PRESSURE: 111 MMHG | OXYGEN SATURATION: 98 % | TEMPERATURE: 99 F | HEART RATE: 98 BPM

## 2018-11-04 DIAGNOSIS — B34.9 VIRAL SYNDROME: ICD-10-CM

## 2018-11-04 PROCEDURE — 25000125 ZZHC RX 250: Performed by: EMERGENCY MEDICINE

## 2018-11-04 PROCEDURE — 94640 AIRWAY INHALATION TREATMENT: CPT | Performed by: EMERGENCY MEDICINE

## 2018-11-04 PROCEDURE — 25000132 ZZH RX MED GY IP 250 OP 250 PS 637: Performed by: EMERGENCY MEDICINE

## 2018-11-04 PROCEDURE — 99282 EMERGENCY DEPT VISIT SF MDM: CPT | Mod: Z6 | Performed by: EMERGENCY MEDICINE

## 2018-11-04 PROCEDURE — 99283 EMERGENCY DEPT VISIT LOW MDM: CPT | Mod: 25 | Performed by: EMERGENCY MEDICINE

## 2018-11-04 RX ORDER — PSEUDOEPHEDRINE HCL 30 MG
60 TABLET ORAL EVERY 6 HOURS PRN
Qty: 20 TABLET | Refills: 0 | Status: SHIPPED | OUTPATIENT
Start: 2018-11-04 | End: 2019-03-08

## 2018-11-04 RX ORDER — ACETAMINOPHEN 325 MG/1
975 TABLET ORAL ONCE
Status: DISCONTINUED | OUTPATIENT
Start: 2018-11-04 | End: 2018-11-04 | Stop reason: HOSPADM

## 2018-11-04 RX ORDER — PSEUDOEPHEDRINE HCL 30 MG
60 TABLET ORAL EVERY 4 HOURS PRN
Status: COMPLETED | OUTPATIENT
Start: 2018-11-04 | End: 2018-11-04

## 2018-11-04 RX ORDER — AZITHROMYCIN 250 MG/1
TABLET, FILM COATED ORAL
Qty: 6 TABLET | Refills: 0 | Status: SHIPPED | OUTPATIENT
Start: 2018-11-04 | End: 2019-03-08

## 2018-11-04 RX ORDER — ALBUTEROL SULFATE 0.83 MG/ML
2.5 SOLUTION RESPIRATORY (INHALATION)
Status: DISCONTINUED | OUTPATIENT
Start: 2018-11-04 | End: 2018-11-04 | Stop reason: HOSPADM

## 2018-11-04 RX ORDER — IBUPROFEN 600 MG/1
600 TABLET, FILM COATED ORAL ONCE
Status: COMPLETED | OUTPATIENT
Start: 2018-11-04 | End: 2018-11-04

## 2018-11-04 RX ADMIN — IBUPROFEN 600 MG: 600 TABLET ORAL at 01:40

## 2018-11-04 RX ADMIN — ALBUTEROL SULFATE 2.5 MG: 2.5 SOLUTION RESPIRATORY (INHALATION) at 01:41

## 2018-11-04 RX ADMIN — PSEUDOEPHEDRINE HCL 60 MG: 30 TABLET, FILM COATED ORAL at 06:40

## 2018-11-04 ASSESSMENT — ENCOUNTER SYMPTOMS
FATIGUE: 1
COUGH: 1

## 2018-11-04 NOTE — ED AVS SNAPSHOT
" [facility], Emergency Department    [address] [facility] [address]    Phone:  [phone]                                       Andrew Lockwood   MRN: [id number]    Department:  [facility], Emergency Department   Date of Visit:  11/4/2018           Patient Information     Date Of Birth          11/27/1967        Your diagnoses for this visit were:     Viral syndrome        You were seen by Thanh Lheman MD.        Discharge Instructions         Start antibiotics as directed  Use Sudafed for congestion and follow-up with your primary care provider if you do not have one you should get one, see below.  Please make an appointment to follow up with [facility] (phone: [phone])     Viral Syndrome (Adult)  A viral illness may cause a number of symptoms. The symptoms depend on the part of the body that the virus affects. If it settles in your nose, throat, and lungs, it may cause cough, sore throat, congestion, and sometimes headache. If it settles in your stomach and intestinal tract, it may cause vomiting and diarrhea. Sometimes it causes vague symptoms like \"aching all over,\" feeling tired, loss of appetite, or fever.  A viral illness usually lasts 1 to 2 weeks, but sometimes it lasts longer. In some cases, a more serious infection can look like a viral syndrome in the first few days of the illness. You may need another exam and additional tests to know the difference. Watch for the warning signs listed below.  Home care  Follow these guidelines for taking care of yourself at home:    If symptoms are severe, rest at home for the first 2 to 3 days.    Stay away from cigarette smoke - both your smoke and the smoke from others.    You may use over-the-counter acetaminophen or ibuprofen for fever, muscle aching, and headache, unless another medicine was prescribed for this. If you have chronic liver or kidney disease or ever had a stomach ulcer or GI bleeding, talk with " your doctor before using these medicines. No one who is younger than 18 and ill with a fever should take aspirin. It may cause severe disease or death.    Your appetite may be poor, so a light diet is fine. Avoid dehydration by drinking 8 to 12 8-ounce glasses of fluids each day. This may include water; orange juice; lemonade; apple, grape, and cranberry juice; clear fruit drinks; electrolyte replacement and sports drinks; and decaffeinated teas and coffee. If you have been diagnosed with a kidney disease, ask your doctor how much and what types of fluids you should drink to prevent dehydration. If you have kidney disease, drinking too much fluid can cause it build up in the your body and be dangerous to your health.    Over-the-counter remedies won't shorten the length of the illness but may be helpful for cough, sore throat; and nasal and sinus congestion. Don't use decongestants if you have high blood pressure.  Follow-up care  Follow up with your healthcare provider if you do not improve over the next week.  Call 911  Call 911 if any of the following occur:    Convulsion    Feeling weak, dizzy, or like you are going to faint    Chest pain, shortness of breath, wheezing, or difficulty breathing  When to seek medical advice  Call your healthcare provider right away if any of these occur:    Cough with lots of colored sputum (mucus) or blood in your sputum    Chest pain, shortness of breath, wheezing, or difficulty breathing    Severe headache; face, neck, or ear pain    Severe, constant pain in the lower right side of your belly (abdominal)    Continued vomiting (can t keep liquids down)    Frequent diarrhea (more than 5 times a day); blood (red or black color) or mucus in diarrhea    Feeling weak, dizzy, or like you are going to faint    Extreme thirst    Fever of 100.4 F (38 C) or higher, or as directed by your healthcare provider  Date Last Reviewed: 9/25/2015 2000-2017 [employer] [address]  [place] [address]. All rights reserved. This information is not intended as a substitute for professional medical care. Always follow your healthcare professional's instructions.          Your next 10 appointments already scheduled     Nov 13, 2018 10:00 AM CST   (Arrive by 9:45 AM)   New Patient Visit with Thanh Lundberg MD   [facility] ([facility])    [address] [facility]  [address] [facility] [address] [phone]              24 Hour Appointment Hotline       To make an appointment at any [facility], call [phone] ([phone]). If you don't have a family doctor or clinic, we will help you find one. [facility] are conveniently located to serve the needs of you and your family.             Review of your medicines      START taking        Dose / Directions Last dose taken    azithromycin 250 MG tablet   Commonly known as:  ZITHROMAX Z-OLIVERIO   Quantity:  6 tablet        Two tablets on the first day, then one tablet daily for the next 4 days   Refills:  0        pseudoePHEDrine 30 MG tablet   Commonly known as:  SUDAFED   Dose:  60 mg   Quantity:  20 tablet        Take 2 tablets (60 mg) by mouth every 6 hours as needed for congestion   Refills:  0          Our records show that you are taking the medicines listed below. If these are incorrect, please call your family doctor or clinic.        Dose / Directions Last dose taken    bictegravir-emtricitabine-tenofovir -25 MG per tablet   Commonly known as:  BIKTARVY   Dose:  1 tablet   Quantity:  30 tablet        Take 1 tablet by mouth daily   Refills:  11        blood glucose lancets standard   Commonly known as:  no brand specified   Quantity:  100 Box        Use to test blood sugar four times daily or as directed.   Refills:  5        blood glucose monitoring meter device kit   Commonly known as:  no brand specified   Quantity:  1 kit        Use to test blood sugar 4  times daily or as directed.   Refills:  0        blood glucose monitoring test strip   Commonly known as:  no brand specified   Dose:  1 strip   Quantity:  100 strip        1 strip by In Vitro route 4 times daily (before meals and nightly) Use to test blood sugars 4 times daily or as directed   Refills:  11        calcium carbonate 500 MG chewable tablet   Commonly known as:  TUMS   Dose:  2 chew tab   Quantity:  150 tablet        Take 2 tablets (1,000 mg) by mouth 3 times daily as needed for heartburn   Refills:  3        empagliflozin 10 MG Tabs tablet   Commonly known as:  JARDIANCE   Quantity:  90 tablet        Take 1 tablet by mouth daily   Refills:  3        gabapentin 100 MG capsule   Commonly known as:  NEURONTIN   Dose:  200 mg   Quantity:  90 capsule        Take 2 capsules (200 mg) by mouth 3 times daily   Refills:  0        glycerin (adult) 2 g Supp Suppository   Dose:  1 suppository   Quantity:  5 suppository        Place 1 suppository rectally daily as needed for constipation   Refills:  0        hyoscyamine 0.125 MG Tbdp   Dose:  0.25 mg   Quantity:  60 tablet        Take 2 tablets (0.25 mg) by mouth 4 times daily as needed for cramping   Refills:  0        ibuprofen 200 MG tablet   Commonly known as:  ADVIL/MOTRIN   Dose:  400 mg   Quantity:  60 tablet        Take 2 tablets (400 mg) by mouth every 8 hours as needed for pain   Refills:  0        * insulin glargine 100 UNIT/ML injection   Commonly known as:  LANTUS SOLOSTAR   Dose:  20 Units   Quantity:  15 mL        Inject 20 Units Subcutaneous every morning   Refills:  0        * BASAGLAR 100 UNIT/ML injection   Dose:  20 Units   Quantity:  15 mL        Inject 20 Units Subcutaneous daily   Refills:  0        insulin pen needle 31G X 8 MM   Commonly known as:  B-D U/F   Quantity:  100 each        Use 1 pen needles daily or as directed.   Refills:  0        magnesium oxide 400 MG tablet   Commonly known as:  MAG-OX   Dose:  400 mg   Quantity:  30 tablet         Take 1 tablet (400 mg) by mouth daily   Refills:  0        omeprazole 20 MG CR capsule   Commonly known as:  priLOSEC   Dose:  20 mg   Quantity:  180 capsule        Take 1 capsule (20 mg) by mouth 2 times daily   Refills:  3        ondansetron 4 MG ODT tab   Commonly known as:  ZOFRAN-ODT   Dose:  4 mg   Quantity:  6 tablet        Take 1 tablet (4 mg) by mouth 2 times daily   Refills:  0        ranitidine 150 MG tablet   Commonly known as:  ZANTAC   Dose:  150 mg   Quantity:  60 tablet        Take 1 tablet (150 mg) by mouth 2 times daily   Refills:  3        simethicone 80 MG chewable tablet   Commonly known as:  MYLICON   Dose:  80 mg   Quantity:  30 tablet        Take 1 tablet (80 mg) by mouth 4 times daily   Refills:  0        * Notice:  This list has 2 medication(s) that are the same as other medications prescribed for you. Read the directions carefully, and ask your doctor or other care provider to review them with you.            Prescriptions were sent or printed at these locations (2 Prescriptions)                   Other Prescriptions                Printed at Department/Unit printer (2 of 2)         azithromycin (ZITHROMAX Z-OLIVERIO) 250 MG tablet               pseudoePHEDrine (SUDAFED) 30 MG tablet                Orders Needing Specimen Collection     None      Pending Results     No orders found from 11/2/2018 to 11/5/2018.            Pending Culture Results     No orders found from 11/2/2018 to 11/5/2018.            Pending Results Instructions     If you had any lab results that were not finalized at the time of your Discharge, you can call the ED Lab Result RN at [phone]. You will be contacted by this team for any positive Lab results or changes in treatment. The nurses are available 7 days a week from 10A to 6:30P.  You can leave a message 24 hours per day and they will return your call.        Thank you for choosing [facility]       Thank you for choosing [facility] for your care. Our goal is  always to provide you with excellent care. Hearing back from our patients is one way we can continue to improve our services. Please take a few minutes to complete the written survey that you may receive in the mail after you visit with us. Thank you!        [employer] Information     [email or web address] gives you secure access to your electronic health record. If you see a primary care provider, you can also send messages to your care team and make appointments. If you have questions, please call your primary care clinic.  If you do not have a primary care provider, please call [phone] and they will assist you.        Care EveryWhere ID     This is your Care EveryWhere ID. This could be used by other organizations to access your [facility] medical records  [id number]        Equal Access to Services     RA JORDAN : Apollo Cody, maverick centeno, luis enrique reyna, dez salazar. So [facility] [phone].    ATENCIÓN: Si habla español, tiene a kohli disposición servicios gratuitos de asistencia lingüística. Llame al [phone].    We comply with applicable federal civil rights laws and [place] laws. We do not discriminate on the basis of race, color, national origin, age, disability, sex, sexual orientation, or gender identity.            After Visit Summary       This is your record. Keep this with you and show to your community pharmacist(s) and doctor(s) at your next visit.

## 2018-11-04 NOTE — ED TRIAGE NOTES
Pt presents [facility] ER via EMS. Pt states that he has had flu like symptoms for the last 3 days which has continued to worsen

## 2018-11-04 NOTE — ED AVS SNAPSHOT
[facility] [place], Emergency Department    [address] [place] [address]    Phone:  [phone]                                       Andrew Lockwood   MRN: [id number]    Department:  [facility], Emergency Department   Date of Visit:  11/4/2018           After Visit Summary Signature Page     I have received my discharge instructions, and my questions have been answered. I have discussed any challenges I see with this plan with the nurse or doctor.    ..........................................................................................................................................  Patient/Patient Representative Signature      ..........................................................................................................................................  Patient Representative Print Name and Relationship to Patient    ..................................................               ................................................  Date                                   Time    ..........................................................................................................................................  Reviewed by Signature/Title    ...................................................              ..............................................  Date                                               Time          22EPIC Rev 08/18

## 2018-11-04 NOTE — ED PROVIDER NOTES
History     Chief Complaint   Patient presents with     Flu Symptoms     HPI  Andrew Lockwood is a 50 year old male with a history of HIV, DM2, CNS toxoplasmosis who presents to the Emergency Department for the evaluation of flu symptoms. Patient complains of a cough. He came to the ED two days ago for similar symptoms and was determined to have laryngitis. Patient states his symptoms have worsened since. Patient admits to smoking. Patient lives in a shelter.     I have reviewed the Medications, Allergies, Past Medical and Surgical History, and Social History in the [employer] system.  Past Medical History:   Diagnosis Date     AIDS (H)      Allergic rhinitis due to other allergen     DNS     Chronic abdominal pain      CNS toxoplasmosis (H)      Diabetes type 2, controlled (H)      GERD (gastroesophageal reflux disease)      HIV (human immunodeficiency virus infection) (H)      Periungual wart      Sleep apnea     doesn't use CPAP       Past Surgical History:   Procedure Laterality Date     C NONSPECIFIC PROCEDURE      right forearm fracture     COLONOSCOPY Left 1/22/2016    Procedure: COMBINED COLONOSCOPY, SINGLE OR MULTIPLE BIOPSY/POLYPECTOMY BY BIOPSY;  Surgeon: Clark Saini MD;  Location: [facility] GI     HC EXPLORE UNDESC TESTIS,INGUIN/SCROTAL       LAPAROSCOPIC APPENDECTOMY N/A 1/31/2018    Procedure: LAPAROSCOPIC APPENDECTOMY;  LAPAROSCOPIC APPENDECTOMY;  Surgeon: Dawn Holt MD;  Location: [facility] OR     LAPAROSCOPY DIAGNOSTIC (GENERAL) N/A 7/26/2016    Procedure: LAPAROSCOPY DIAGNOSTIC (GENERAL);  Surgeon: Susannah Arriaga MD;  Location: [facility] OR     LAPAROSCOPY DIAGNOSTIC (GENERAL) N/A 4/16/2018    Procedure: LAPAROSCOPY DIAGNOSTIC (GENERAL);  Diagnostic laparoscopy and lysis of adhesions;  Surgeon: Prince Dowling MD;  Location: [facility] OR     OPTICAL TRACKING SYSTEM CRANIOTOMY, EXCISE TUMOR, COMBINED Left 4/10/2015    Procedure: COMBINED OPTICAL TRACKING SYSTEM CRANIOTOMY, EXCISE TUMOR;   Surgeon: Mirlande Colmenares MD;  Location: [facility] OR     REPAIR GAMEKEEPER'S THUMB Right 12/2/2016    Procedure: REPAIR LIGAMENT ULNAR COLLATERAL THUMB (GAMEKEEPER'S);  Surgeon: Evin Zamorano MD;  Location: [facility] OR       Family History   Problem Relation Age of Onset     Diabetes Brother      Diabetes Father      Alzheimer Disease Father      Unknown/Adopted Mother      Diabetes Paternal Grandfather      Cancer No family hx of      no skin cancer     Skin Cancer No family hx of      no famiy hx of skin cancer     Glaucoma No family hx of      Macular Degeneration No family hx of        Social History   Substance Use Topics     Smoking status: Current Every Day Smoker     Packs/day: 0.50     Types: Cigarettes     Last attempt to quit: 10/28/2016     Smokeless tobacco: Former User      Comment: quit Aug 2018     Alcohol use No      Comment: Last etoh in 2007       Current Facility-Administered Medications   Medication     acetaminophen (TYLENOL) tablet 975 mg     albuterol neb solution 2.5 mg     Current Outpatient Prescriptions   Medication     BASAGLAR 100 UNIT/ML injection     Bictegravir-Emtricitab-Tenofov -25 MG TABS     blood glucose (NO BRAND SPECIFIED) lancets standard     blood glucose monitoring (NO BRAND SPECIFIED) meter device kit     blood glucose monitoring (NO BRAND SPECIFIED) test strip     calcium carbonate (TUMS) 500 MG chewable tablet     empagliflozin (JARDIANCE) 10 MG TABS tablet     gabapentin (NEURONTIN) 100 MG capsule     glycerin, adult, 2 g SUPP Suppository     hyoscyamine 0.125 MG TBDP     ibuprofen (ADVIL/MOTRIN) 200 MG tablet     insulin glargine (LANTUS SOLOSTAR) 100 UNIT/ML pen     insulin pen needle (B-D U/F) 31G X 8 MM     magnesium oxide (MAG-OX) 400 MG tablet     omeprazole (PRILOSEC) 20 MG CR capsule     ondansetron (ZOFRAN-ODT) 4 MG ODT tab     ranitidine (ZANTAC) 150 MG tablet     simethicone (MYLICON) 80 MG chewable tablet        Allergies   Allergen Reactions      Metformin      Abdominal pain     Milk [Lac Bovis] Hives     Tylenol [Acetaminophen] Itching     Dulaglutide Rash       Review of Systems   Constitutional: Positive for fatigue.   HENT: Positive for congestion and sneezing.    Respiratory: Positive for cough.    All other systems reviewed and are negative.      Physical Exam   BP: 133/88  Pulse: 98  Temp: 99  F (37.2  C)  Resp: 18  SpO2: 98 %      Physical Exam   Constitutional: He is oriented to person, place, and time. He appears well-developed and well-nourished. No distress.   HENT:   Head: Normocephalic and atraumatic.   Mouth/Throat: Oropharynx is clear and moist.   Eyes: Conjunctivae and EOM are normal. Pupils are equal, round, and reactive to light. No scleral icterus.   Neck: Neck supple.   Cardiovascular: Normal rate, regular rhythm and normal heart sounds.    Pulmonary/Chest: Effort normal and breath sounds normal. No respiratory distress. He has no wheezes.   Abdominal: There is no tenderness.   Neurological: He is alert and oriented to person, place, and time.   Skin: Skin is warm.   Nursing note and vitals reviewed.      ED Course   1:28 AM  The patient was seen and examined by Mick Lehman MD in Room 6.     ED Course     Procedures        Medications   acetaminophen (TYLENOL) tablet 975 mg (975 mg Oral Not Given 11/4/18 0145)   albuterol neb solution 2.5 mg (2.5 mg Nebulization Given 11/4/18 0141)   ibuprofen (ADVIL/MOTRIN) tablet 600 mg (600 mg Oral Given 11/4/18 0140)            Labs Ordered and Resulted from Time of ED Arrival Up to the Time of Departure from the ED - No data to display         Assessments & Plan (with Medical Decision Making)   50-year-old male well-known to the emergency department for frequent visits here now complaining of congestion and cold symptoms.  Says is been going on for about the last 3days.  He is homeless and living in a shelter.  Here he was afebrile with normal vital signs lungs were clear he has upper  respiratory congestion no indication for labs or imaging.  He is frequently in the emergency department.  Will discharge home with Sudafed and cough suppressant.  Follow-up with your primary care provider please.    I have reviewed the nursing notes.    I have reviewed the findings, diagnosis, plan and need for follow up with the patient.    New Prescriptions    No medications on file       Final diagnoses:   Viral syndrome     I, Venu Mercedes, am serving as a trained medical scribe to document services personally performed by Mick Lehman MD, based on the provider's statements to me.   I, Mick Lehman MD, was physically present and have reviewed and verified the accuracy of this note documented by Venu Mercedes.    11/4/2018   [facility], [place], EMERGENCY DEPARTMENT     Thanh Lehman MD  11/04/18 0456

## 2018-11-04 NOTE — DISCHARGE INSTRUCTIONS
"  Start antibiotics as directed  Use Sudafed for congestion and follow-up with your primary care provider if you do not have one you should get one, see below.  Please make an appointment to follow up with [facility] (phone: [phone])     Viral Syndrome (Adult)  A viral illness may cause a number of symptoms. The symptoms depend on the part of the body that the virus affects. If it settles in your nose, throat, and lungs, it may cause cough, sore throat, congestion, and sometimes headache. If it settles in your stomach and intestinal tract, it may cause vomiting and diarrhea. Sometimes it causes vague symptoms like \"aching all over,\" feeling tired, loss of appetite, or fever.  A viral illness usually lasts 1 to 2 weeks, but sometimes it lasts longer. In some cases, a more serious infection can look like a viral syndrome in the first few days of the illness. You may need another exam and additional tests to know the difference. Watch for the warning signs listed below.  Home care  Follow these guidelines for taking care of yourself at home:    If symptoms are severe, rest at home for the first 2 to 3 days.    Stay away from cigarette smoke - both your smoke and the smoke from others.    You may use over-the-counter acetaminophen or ibuprofen for fever, muscle aching, and headache, unless another medicine was prescribed for this. If you have chronic liver or kidney disease or ever had a stomach ulcer or GI bleeding, talk with your doctor before using these medicines. No one who is younger than 18 and ill with a fever should take aspirin. It may cause severe disease or death.    Your appetite may be poor, so a light diet is fine. Avoid dehydration by drinking 8 to 12 8-ounce glasses of fluids each day. This may include water; orange juice; lemonade; apple, grape, and cranberry juice; clear fruit drinks; electrolyte replacement and sports drinks; and decaffeinated teas and coffee. If you have " been diagnosed with a kidney disease, ask your doctor how much and what types of fluids you should drink to prevent dehydration. If you have kidney disease, drinking too much fluid can cause it build up in the your body and be dangerous to your health.    Over-the-counter remedies won't shorten the length of the illness but may be helpful for cough, sore throat; and nasal and sinus congestion. Don't use decongestants if you have high blood pressure.  Follow-up care  Follow up with your healthcare provider if you do not improve over the next week.  Call 911  Call 911 if any of the following occur:    Convulsion    Feeling weak, dizzy, or like you are going to faint    Chest pain, shortness of breath, wheezing, or difficulty breathing  When to seek medical advice  Call your healthcare provider right away if any of these occur:    Cough with lots of colored sputum (mucus) or blood in your sputum    Chest pain, shortness of breath, wheezing, or difficulty breathing    Severe headache; face, neck, or ear pain    Severe, constant pain in the lower right side of your belly (abdominal)    Continued vomiting (can t keep liquids down)    Frequent diarrhea (more than 5 times a day); blood (red or black color) or mucus in diarrhea    Feeling weak, dizzy, or like you are going to faint    Extreme thirst    Fever of 100.4 F (38 C) or higher, or as directed by your healthcare provider  Date Last Reviewed: 9/25/2015 2000-2017 [employer]. [address] [place] [address], [place] [address]. All rights reserved. This information is not intended as a substitute for professional medical care. Always follow your healthcare professional's instructions.

## 2018-11-06 ENCOUNTER — OFFICE VISIT (OUTPATIENT)
Dept: INTERNAL MEDICINE | Facility: CLINIC | Age: 53
End: 2018-11-06
Payer: COMMERCIAL

## 2018-11-06 VITALS
DIASTOLIC BLOOD PRESSURE: 67 MMHG | SYSTOLIC BLOOD PRESSURE: 100 MMHG | HEART RATE: 93 BPM | BODY MASS INDEX: 28.25 KG/M2 | OXYGEN SATURATION: 97 % | WEIGHT: 164.6 LBS | TEMPERATURE: 98.6 F

## 2018-11-06 DIAGNOSIS — B20 HUMAN IMMUNODEFICIENCY VIRUS (HIV) DISEASE (H): Primary | ICD-10-CM

## 2018-11-06 DIAGNOSIS — R05.9 COUGH: ICD-10-CM

## 2018-11-06 DIAGNOSIS — Z71.6 ENCOUNTER FOR SMOKING CESSATION COUNSELING: ICD-10-CM

## 2018-11-06 DIAGNOSIS — Z00.00 PREVENTATIVE HEALTH CARE: ICD-10-CM

## 2018-11-06 DIAGNOSIS — E11.65 TYPE 2 DIABETES MELLITUS WITH HYPERGLYCEMIA, WITHOUT LONG-TERM CURRENT USE OF INSULIN (H): ICD-10-CM

## 2018-11-06 DIAGNOSIS — Z21 HUMAN IMMUNODEFICIENCY VIRUS I INFECTION (H): ICD-10-CM

## 2018-11-06 DIAGNOSIS — Z23 NEED FOR INFLUENZA VACCINATION: Primary | ICD-10-CM

## 2018-11-06 DIAGNOSIS — B20 HUMAN IMMUNODEFICIENCY VIRUS (HIV) DISEASE (H): ICD-10-CM

## 2018-11-06 LAB
ALBUMIN SERPL-MCNC: 3.3 G/DL (ref 3.4–5)
ALP SERPL-CCNC: 114 U/L (ref 40–150)
ALT SERPL W P-5'-P-CCNC: 29 U/L (ref 0–70)
ANION GAP SERPL CALCULATED.3IONS-SCNC: 9 MMOL/L (ref 3–14)
AST SERPL W P-5'-P-CCNC: 24 U/L (ref 0–45)
BASOPHILS # BLD AUTO: 0 10E9/L (ref 0–0.2)
BASOPHILS NFR BLD AUTO: 0.3 %
BILIRUB SERPL-MCNC: 0.6 MG/DL (ref 0.2–1.3)
BUN SERPL-MCNC: 11 MG/DL (ref 7–30)
CALCIUM SERPL-MCNC: 8.9 MG/DL (ref 8.5–10.1)
CD3 CELLS # BLD: 851 CELLS/UL (ref 603–2990)
CD3 CELLS NFR BLD: 50 % (ref 49–84)
CD3+CD4+ CELLS # BLD: 235 CELLS/UL (ref 441–2156)
CD3+CD4+ CELLS NFR BLD: 14 % (ref 28–63)
CD3+CD4+ CELLS/CD3+CD8+ CLL BLD: 0.41 % (ref 1.4–2.6)
CD3+CD8+ CELLS # BLD: 580 CELLS/UL (ref 125–1312)
CD3+CD8+ CELLS NFR BLD: 34 % (ref 10–40)
CHLORIDE SERPL-SCNC: 101 MMOL/L (ref 94–109)
CO2 SERPL-SCNC: 22 MMOL/L (ref 20–32)
CREAT SERPL-MCNC: 0.86 MG/DL (ref 0.66–1.25)
CREAT UR-MCNC: 85 MG/DL
DIFFERENTIAL METHOD BLD: ABNORMAL
EOSINOPHIL # BLD AUTO: 0.1 10E9/L (ref 0–0.7)
EOSINOPHIL NFR BLD AUTO: 2 %
ERYTHROCYTE [DISTWIDTH] IN BLOOD BY AUTOMATED COUNT: 12.5 % (ref 10–15)
GFR SERPL CREATININE-BSD FRML MDRD: >90 ML/MIN/1.7M2
GLUCOSE SERPL-MCNC: 360 MG/DL (ref 70–99)
HCT VFR BLD AUTO: 38.8 % (ref 40–53)
HGB BLD-MCNC: 13.5 G/DL (ref 13.3–17.7)
IFC SPECIMEN: ABNORMAL
IMM GRANULOCYTES # BLD: 0 10E9/L (ref 0–0.4)
IMM GRANULOCYTES NFR BLD: 0.4 %
LYMPHOCYTES # BLD AUTO: 1.7 10E9/L (ref 0.8–5.3)
LYMPHOCYTES NFR BLD AUTO: 24.7 %
MCH RBC QN AUTO: 30.9 PG (ref 26.5–33)
MCHC RBC AUTO-ENTMCNC: 34.8 G/DL (ref 31.5–36.5)
MCV RBC AUTO: 89 FL (ref 78–100)
MICROALBUMIN UR-MCNC: 32 MG/L
MICROALBUMIN/CREAT UR: 38.16 MG/G CR (ref 0–17)
MONOCYTES # BLD AUTO: 0.4 10E9/L (ref 0–1.3)
MONOCYTES NFR BLD AUTO: 5.8 %
NEUTROPHILS # BLD AUTO: 4.7 10E9/L (ref 1.6–8.3)
NEUTROPHILS NFR BLD AUTO: 66.8 %
NRBC # BLD AUTO: 0 10*3/UL
NRBC BLD AUTO-RTO: 0 /100
PLATELET # BLD AUTO: 249 10E9/L (ref 150–450)
POTASSIUM SERPL-SCNC: 3.2 MMOL/L (ref 3.4–5.3)
PROT SERPL-MCNC: 7.7 G/DL (ref 6.8–8.8)
RBC # BLD AUTO: 4.37 10E12/L (ref 4.4–5.9)
SODIUM SERPL-SCNC: 133 MMOL/L (ref 133–144)
T PALLIDUM AB SER QL: NONREACTIVE
WBC # BLD AUTO: 7 10E9/L (ref 4–11)

## 2018-11-06 RX ORDER — MONTELUKAST SODIUM 10 MG/1
10 TABLET ORAL AT BEDTIME
Qty: 30 TABLET | Refills: 1 | Status: SHIPPED | OUTPATIENT
Start: 2018-11-06 | End: 2019-04-15

## 2018-11-06 RX ORDER — ALBUTEROL SULFATE 90 UG/1
2 AEROSOL, METERED RESPIRATORY (INHALATION) EVERY 6 HOURS PRN
Qty: 1 INHALER | Refills: 1 | Status: SHIPPED | OUTPATIENT
Start: 2018-11-06 | End: 2018-11-01

## 2018-11-06 ASSESSMENT — PAIN SCALES - GENERAL: PAINLEVEL: WORST PAIN (10)

## 2018-11-06 NOTE — MR AVS SNAPSHOT
After Visit Summary   11/6/2018    Andrew Lockwood    MRN: [id number]           Patient Information     Date Of Birth          11/27/1967        Visit Information        Provider Department      11/6/2018 1:45 PM Venu Ghotra MD [facility]        Today's Diagnoses     Need for influenza vaccination    -  1      Care Instructions    Primary Care Center Medication Refill Request Information:  * Please contact your pharmacy regarding ANY request for medication refills.  ** [facility] Prescription Fax = [phone]  * Please allow 3 business days for routine medication refills.  * Please allow 5 business days for controlled substance medication refills.     Primary Care Center Test Result notification information:  *You will be notified with in 7-10 days of your appointment day regarding the results of your test.  If you are on MyChart you will be notified as soon as the provider has reviewed the results and signed off on them.    Primary Care Center: [phone]                       [facility]         Internal Medicine Resident                   Continuity Clinic    Who We Are    Resident Continuity Clinic is a part of the [facility].  Resident physicians see patients independently and establish a relationship with them over the course of their three-year residency program.  As with the Primary Care Clinic, our Resident Continuity Clinic models a group practice.  If your doctor is not available, you will be able to see another resident physician.  At the end of a resident s training, patients will be transitioned to a new resident physician for ongoing care.     We treat patients with a wide array of medical needs from routine physicals, to acute illnesses, to diabetes and blood pressure management, to complex medical illness.  What is a Resident Physician?    Resident physicians hold medical degrees and are doctors. They are training to become  specialists in Internal Medicine. They work under the supervision of board-certified faculty physicians.  Expectations for Your Care    We strive to provide accessible, quality care at all times.    In order to provide this care, it is best to see your primary care resident doctor consistently rather switching between providers.  In the event you do see another physician, you should schedule a follow-up visit with your usual primary care doctor.    If you are transitioning your care from another clinic, it is helpful to have your records available for your doctor to review.    We do not prescribe controlled substances, such as ADD medications or narcotic pain medications, on your first visit.  We will review your health records and concerns prior to devising a treatment plan with you in order to provide the best care.      Clinic Services     Extended clinic hours; patient  to help navigate your visit;  parking; laboratory and imaging services with evening and weekend hours    Multiple medical and surgical specialties in one building    Complementary services, including Nutrition, Integrative Medicine, Pharmacy consultations, Mental and Behavioral Health, Sports Medicine and Physical Therapy    Thank You    We would like to thank you for choosing the [facility] Internal Medicine Resident Continuity Clinic for your primary care. You are making a priceless contribution to the training of the next generation of health care practitioners.     Contact us at [phone] for appointments or questions.    Resident Clinic Hours are Tuesdays and Thursdays, 7:30am-5:00pm    Residents   Pepito Campbell MD  (Male)   Eloina Jama MD (Female)  Dali Stewart MD   (Female)   Florence Webster MD   (Female)   Arvind Stearns MD  (Male)   Venu Ghotra MD  (Male)   Dimple Mac MD    (Female)   Bret Hernandez MD  (Male)   Ike Perrin MD  (Male)    Karly Ayala MD  (Female)   Tavares Suresh  MD  (Male)   Jaky Gerardo MD  (Female)   Loi Garcia MD   (Female)   Armando Minaya MD  (Male)   Jarocho Vincent MD  (Male)   Venu Carrillo MD (Male)   Jamie Montesinos MD  (Male)   Rhina Crowe MD (Female)    Jolynn Napier MD (Female)   Omaira Hathaway MD  (Male)   Barbara Sharma MD    (Female)   Kacy Jernigan MD  (Female)    Supervising Physicians   MD Jeannine Pizano, MD Rafi Cassidy, MD Prince Rodríguez, MD Kate Duffy, MD Susan Jim, MD Leydi Felder, MD Meron Caballero MD                       Follow-ups after your visit        Your next 10 appointments already scheduled     Nov 13, 2018 10:00 AM CST   (Arrive by 9:45 AM)   New Patient Visit with Thanh Lundberg MD   [facility] ([facility])    [address] [place] [address]  4th Floor  [facility] [address] [phone]            Nov 14, 2018  2:30 PM CST   (Arrive by 2:15 PM)   Return Visit with Florence Carbajal MD   [facility] ([facility])    [address] [place] [address]  [address] [place] [address] [phone]              Who to contact     Please call your clinic at [phone] to:    Ask questions about your health    Make or cancel appointments    Discuss your medicines    Learn about your test results    Speak to your doctor            Additional Information About Your Visit        [employer] Information     [employer]ome gives you secure access to your electronic health record. If you see a primary care provider, you can also send messages to your care team and make appointments. If you have questions, please call your primary care clinic.  If you do not have a primary care provider, please call [phone] and they will assist you.      [employer]ome is an electronic gateway that provides easy, online access to your medical records. With [employer]ome, you can request a  clinic appointment, read your test results, renew a prescription or communicate with your care team.     To access your existing account, please contact your [facility] or call [phone] for assistance.        Care EveryWhere ID     This is your Care EveryWhere ID. This could be used by other organizations to access your [facility] medical records  [id number]        Your Vitals Were     Pulse Temperature Pulse Oximetry BMI (Body Mass Index)          93 98.6  F (37  C) (Oral) 97% 28.25 kg/m2         Blood Pressure from Last 3 Encounters:   11/06/18 100/67   11/04/18 111/62   11/02/18 150/80    Weight from Last 3 Encounters:   11/06/18 74.7 kg (164 lb 9.6 oz)   10/30/18 75.6 kg (166 lb 11.2 oz)   10/10/18 78.4 kg (172 lb 14.4 oz)              Today, you had the following     No orders found for display       Primary Care Provider Office Phone # Fax #    Caitie Christin Lamb [phone]       [address] [place] [address] [address] [place] [phone]        Equal Access to Services     [facility] : Hadii aad ku hadasho Soorenali, waaxda luqadaha, qaybta kaalmada adeechoyada, dez moreno . So [facility] [phone].    ATENCIÓN: Si habla español, tiene a kohli disposición servicios gratuitos de asistencia lingüística. [facility] [phone].    We comply with applicable federal civil rights laws and [place] laws. We do not discriminate on the basis of race, color, national origin, age, disability, sex, sexual orientation, or gender identity.            Thank you!     Thank you for choosing [facility]  for your care. Our goal is always to provide you with excellent care. Hearing back from our patients is one way we can continue to improve our services. Please take a few minutes to complete the written survey that you may receive in the mail after your visit with us. Thank you!             Your Updated Medication List - Protect others  around you: Learn how to safely use, store and throw away your medicines at www.disposemymeds.org.          This list is accurate as of 11/6/18  3:19 PM.  Always use your most recent med list.                   Brand Name Dispense Instructions for use Diagnosis    azithromycin 250 MG tablet    ZITHROMAX Z-OLIVERIO    6 tablet    Two tablets on the first day, then one tablet daily for the next 4 days        bictegravir-emtricitabine-tenofovir -25 MG per tablet    BIKTARVY    30 tablet    Take 1 tablet by mouth daily    HIV (human immunodeficiency virus infection) (H)       blood glucose lancets standard    no brand specified    100 Box    Use to test blood sugar four times daily or as directed.    Type 2 diabetes mellitus without complication, without long-term current use of insulin (H)       blood glucose monitoring meter device kit    no brand specified    1 kit    Use to test blood sugar 4 times daily or as directed.    Type 2 diabetes mellitus with complication, without long-term current use of insulin (H)       blood glucose monitoring test strip    no brand specified    100 strip    1 strip by In Vitro route 4 times daily (before meals and nightly) Use to test blood sugars 4 times daily or as directed    Type 2 diabetes mellitus with complication, without long-term current use of insulin (H)       calcium carbonate 500 MG chewable tablet    TUMS    150 tablet    Take 2 tablets (1,000 mg) by mouth 3 times daily as needed for heartburn    Gastroesophageal reflux disease with esophagitis       empagliflozin 10 MG Tabs tablet    JARDIANCE    90 tablet    Take 1 tablet by mouth daily    Diabetes mellitus, type 2 (H)       gabapentin 100 MG capsule    NEURONTIN    90 capsule    Take 2 capsules (200 mg) by mouth 3 times daily    Chronic abdominal pain       glycerin (adult) 2 g Supp Suppository     5 suppository    Place 1 suppository rectally daily as needed for constipation    Constipation, unspecified  constipation type       hyoscyamine 0.125 MG Tbdp     60 tablet    Take 2 tablets (0.25 mg) by mouth 4 times daily as needed for cramping    Chronic abdominal pain       ibuprofen 200 MG tablet    ADVIL/MOTRIN    60 tablet    Take 2 tablets (400 mg) by mouth every 8 hours as needed for pain        * insulin glargine 100 UNIT/ML injection    LANTUS SOLOSTAR    15 mL    Inject 20 Units Subcutaneous every morning    Type 2 diabetes mellitus with hyperglycemia, without long-term current use of insulin (H)       * BASAGLAR 100 UNIT/ML injection     15 mL    Inject 20 Units Subcutaneous daily    Type 2 diabetes mellitus with hyperglycemia, without long-term current use of insulin (H)       insulin pen needle 31G X 8 MM    B-D U/F    100 each    Use 1 pen needles daily or as directed.    Type 2 diabetes mellitus without complication, without long-term current use of insulin (H)       magnesium oxide 400 MG tablet    MAG-OX    30 tablet    Take 1 tablet (400 mg) by mouth daily    Constipation, unspecified constipation type       omeprazole 20 MG CR capsule    priLOSEC    180 capsule    Take 1 capsule (20 mg) by mouth 2 times daily    Heart burn       ondansetron 4 MG ODT tab    ZOFRAN-ODT    6 tablet    Take 1 tablet (4 mg) by mouth 2 times daily    Nausea       pseudoePHEDrine 30 MG tablet    SUDAFED    20 tablet    Take 2 tablets (60 mg) by mouth every 6 hours as needed for congestion        ranitidine 150 MG tablet    ZANTAC    60 tablet    Take 1 tablet (150 mg) by mouth 2 times daily    Heart burn       simethicone 80 MG chewable tablet    MYLICON    30 tablet    Take 1 tablet (80 mg) by mouth 4 times daily    Chronic abdominal pain       * Notice:  This list has 2 medication(s) that are the same as other medications prescribed for you. Read the directions carefully, and ask your doctor or other care provider to review them with you.

## 2018-11-06 NOTE — PATIENT INSTRUCTIONS
[facility] Medication Refill Request Information:  * Please contact your pharmacy regarding ANY request for medication refills.  ** [facility] Prescription Fax = [phone]  * Please allow 3 business days for routine medication refills.  * Please allow 5 business days for controlled substance medication refills.     [facility] Test Result notification information:  *You will be notified with in 7-10 days of your appointment day regarding the results of your test.  If you are on MyChart you will be notified as soon as the provider has reviewed the results and signed off on them.    [facility]: [phone]                       [facility]         Internal Medicine Resident                   Continuity Clinic    Who We Are    Resident Continuity Clinic is a part of the [facility].  Resident physicians see patients independently and establish a relationship with them over the course of their three-year residency program.  As with the Primary Care Clinic, our Resident Continuity Clinic models a group practice.  If your doctor is not available, you will be able to see another resident physician.  At the end of a resident s training, patients will be transitioned to a new resident physician for ongoing care.     We treat patients with a wide array of medical needs from routine physicals, to acute illnesses, to diabetes and blood pressure management, to complex medical illness.  What is a Resident Physician?    Resident physicians hold medical degrees and are doctors. They are training to become specialists in Internal Medicine. They work under the supervision of board-certified faculty physicians.  Expectations for Your Care    We strive to provide accessible, quality care at all times.    In order to provide this care, it is best to see your primary care resident doctor consistently rather switching between providers.  In the event you do see another physician, you  should schedule a follow-up visit with your usual primary care doctor.    If you are transitioning your care from another clinic, it is helpful to have your records available for your doctor to review.    We do not prescribe controlled substances, such as ADD medications or narcotic pain medications, on your first visit.  We will review your health records and concerns prior to devising a treatment plan with you in order to provide the best care.      Clinic Services     Extended clinic hours; patient  to help navigate your visit;  parking; laboratory and imaging services with evening and weekend hours    Multiple medical and surgical specialties in one building    Complementary services, including Nutrition, Integrative Medicine, Pharmacy consultations, Mental and Behavioral Health, Sports Medicine and Physical Therapy    Thank You    We would like to thank you for choosing the [facility] Internal Medicine Resident Continuity Clinic for your primary care. You are making a priceless contribution to the training of the next generation of health care practitioners.     Contact us at [phone] for appointments or questions.    Resident Clinic Hours are Tuesdays and Thursdays, 7:30am-5:00pm    Residents   Pepito Campbell MD  (Male)   Eloina Jama MD (Female)  Dali Stewart MD   (Female)   Florence Webster MD   (Female)   Arvind Stearns MD  (Male)   Venu Ghotra MD  (Male)   Dimple Mac MD    (Female)   Bret Hernandez MD  (Male)   Ike Perrin MD  (Male)    Karly Ayala MD  (Female)   Tavares Suresh MD  (Male)   Jaky Gerardo MD  (Female)   Loi Garcia MD   (Female)   Armando Minaya MD  (Male)   Jarocho Vincent MD  (Male)   Venu Carrillo MD (Male)   Jamie Montesinos MD  (Male)   Rhina Crowe MD (Female)    Jolynn Napier MD (Female)   Omaira Hathaway MD  (Male)   Barbara Sharma MD    (Female)   Kacy Jernigan MD  (Female)    Supervising  Physicians   MD Jeannine Pizano, MD Rafi Cassidy, MD Prince Rodríguez, MD Kate Duffy, MD Susan Jim, MD Anthony Osuna, MD Meron Lewis MD

## 2018-11-06 NOTE — NURSING NOTE
Chief Complaint   Patient presents with     ER F/U     Patient was seen in the ER for respiratory infection      Results     Pt stated he has had undetectable AIDS since 2015. Did blood lab here, pt wondering about results     Smoking Cessation     Pt is ready to quit smoking. Pt would like tablets or pills to help quit.       Susan Toro, Clinic EMT at 2:35 PM on 11/6/2018    Andrew Lockwood    1. Has the patient received the information for the influenza vaccine? YES    2.  Does the patient have any of the following contraindications?  Allergy to to eggs? No  Allergic reaction to previous influenza vaccines?  No  Any other problems to previous influenza vaccines? No  Paralyzed by Guillain-Belleville syndrome? No  Currently pregnant? NO  Current moderate or severe illness?  No  Allergy to contact lens solution?  No    3. The vaccine has been administered in the usual fashion and the patient was instructed to wait 20 minutes before leaving the building in the even of an allergic reaction: YES    Recorded by Susan Toro, Clinic EMT at 2:24 PM on 11/6/2018

## 2018-11-07 LAB
C TRACH DNA SPEC QL NAA+PROBE: NEGATIVE
N GONORRHOEA DNA SPEC QL NAA+PROBE: NEGATIVE
SPECIMEN SOURCE: NORMAL
SPECIMEN SOURCE: NORMAL

## 2018-11-08 NOTE — PROGRESS NOTES
PRIMARY CARE CLINIC    Resident Clinic Note        Visit Date: November 8, 2018    Andrew Lockwood  MRN: [id number]  YOB: 1967    HPI:  Andrew Lockwood is a 50 year old male has a past medical history of AIDS (H); Allergic rhinitis due to other allergen; Chronic abdominal pain; CNS toxoplasmosis (H); Diabetes type 2, controlled (H); GERD (gastroesophageal reflux disease); HIV (human immunodeficiency virus infection) (H); Periungual wart; and Sleep apnea. He also has no past medical history of Hypertension.    Patient presents to clinic for follow-up post ED visit for cough and sinus congestion.  Patient reports several weeks of nasal congestion, sinus pressure, and intermittent cough.  Patient was prescribed a course of azithromycin at his emergency room appointment.  Patient has taken the last dose of his antibiotic course today.  He reports significant improvement in the consistency and color of his sinus drainage and does feel better overall.  Patient still is complaining of mild cough and clear drainage.  Patient reports no ongoing fevers, chills or sweats.  Patient would like medications for system relief at today's appointment.     Patient also reported a desire to quit smoking.  Has tried several nicotine replacement products in the past including patches and nicotine gum.  He is requesting a oral medication and says several of his friends have had success with quitting on Chantix.  The patient understands that some of this respiratory symptoms are likely due to his ongoing smoking.    ROS:  7 point ROS was reviewed and negative other than stated in HPI    Past medical history reviewed.    Past Medical History:   Diagnosis Date     AIDS (H)      Allergic rhinitis due to other allergen     DNS     Chronic abdominal pain      CNS toxoplasmosis (H)      Diabetes type 2, controlled (H)      GERD (gastroesophageal reflux disease)      HIV (human immunodeficiency virus infection) (H)      Periungual  wart      Sleep apnea     doesn't use CPAP       Allergies   Allergen Reactions     Metformin      Abdominal pain     Milk [Lac Bovis] Hives     Tylenol [Acetaminophen] Itching     Dulaglutide Rash       Past Surgical History:   Procedure Laterality Date     C NONSPECIFIC PROCEDURE      right forearm fracture     COLONOSCOPY Left 1/22/2016    Procedure: COMBINED COLONOSCOPY, SINGLE OR MULTIPLE BIOPSY/POLYPECTOMY BY BIOPSY;  Surgeon: Clark Saini MD;  Location: [facility]     HC EXPLORE UNDESC TESTIS,INGUIN/SCROTAL       LAPAROSCOPIC APPENDECTOMY N/A 1/31/2018    Procedure: LAPAROSCOPIC APPENDECTOMY;  LAPAROSCOPIC APPENDECTOMY;  Surgeon: Dawn Holt MD;  Location: [facility]     LAPAROSCOPY DIAGNOSTIC (GENERAL) N/A 7/26/2016    Procedure: LAPAROSCOPY DIAGNOSTIC (GENERAL);  Surgeon: Susannah Arriaga MD;  Location: [facility]     LAPAROSCOPY DIAGNOSTIC (GENERAL) N/A 4/16/2018    Procedure: LAPAROSCOPY DIAGNOSTIC (GENERAL);  Diagnostic laparoscopy and lysis of adhesions;  Surgeon: Prince Dowling MD;  Location: [facility]     OPTICAL TRACKING SYSTEM CRANIOTOMY, EXCISE TUMOR, COMBINED Left 4/10/2015    Procedure: COMBINED OPTICAL TRACKING SYSTEM CRANIOTOMY, EXCISE TUMOR;  Surgeon: Mirlande Colmenares MD;  Location: [facility]     REPAIR GAMEKEEPER'S THUMB Right 12/2/2016    Procedure: REPAIR LIGAMENT ULNAR COLLATERAL THUMB (GAMEKEEPER'S);  Surgeon: Evin Zamorano MD;  [facility]       Family History   Problem Relation Age of Onset     Diabetes Brother      Diabetes Father      Alzheimer Disease Father      Unknown/Adopted Mother      Diabetes Paternal Grandfather      Cancer No family hx of      no skin cancer     Skin Cancer No family hx of      no famiy hx of skin cancer     Glaucoma No family hx of      Macular Degeneration No family hx of        Social History     Social History     Marital status: Single     Spouse name: N/A     Number of children: N/A     Years of education: N/A  "    Occupational History           5 years; temp agency     Social History Main Topics     Smoking status: Current Every Day Smoker     Packs/day: 0.50     Types: Cigarettes     Last attempt to quit: 10/28/2016     Smokeless tobacco: Former User     Alcohol use No      Comment: Last etoh in 2007     Drug use: No      Comment: \"Not very often\"     Sexual activity: Not Currently     Partners: Female, Male      Comment: Last sexual activity 2008     Other Topics Concern     Not on file     Social History Narrative    Born in [facility].  Came to the [place] in 1993.  Last traveled to visit family in 2008.            Current Outpatient Prescriptions   Medication     albuterol (PROAIR HFA/PROVENTIL HFA/VENTOLIN HFA) 108 (90 Base) MCG/ACT inhaler     azithromycin (ZITHROMAX Z-OLIVERIO) 250 MG tablet     BASAGLAR 100 UNIT/ML injection     Bictegravir-Emtricitab-Tenofov -25 MG TABS     blood glucose (NO BRAND SPECIFIED) lancets standard     blood glucose monitoring (NO BRAND SPECIFIED) meter device kit     blood glucose monitoring (NO BRAND SPECIFIED) test strip     calcium carbonate (TUMS) 500 MG chewable tablet     CHANTIX STARTING MONTH OLIVERIO 0.5 MG X 11 & 1 MG X 42 tablet     empagliflozin (JARDIANCE) 10 MG TABS tablet     gabapentin (NEURONTIN) 100 MG capsule     glycerin, adult, 2 g SUPP Suppository     hyoscyamine 0.125 MG TBDP     ibuprofen (ADVIL/MOTRIN) 200 MG tablet     insulin glargine (LANTUS SOLOSTAR) 100 UNIT/ML pen     insulin pen needle (B-D U/F) 31G X 8 MM     magnesium oxide (MAG-OX) 400 MG tablet     montelukast (SINGULAIR) 10 MG tablet     omeprazole (PRILOSEC) 20 MG CR capsule     ondansetron (ZOFRAN-ODT) 4 MG ODT tab     pseudoePHEDrine (SUDAFED) 30 MG tablet     ranitidine (ZANTAC) 150 MG tablet     simethicone (MYLICON) 80 MG chewable tablet     No current facility-administered medications for this visit.      Vitals:  /67 (BP Location: Right arm, Patient Position: Sitting, Cuff " Size: Adult Regular)  Pulse 93  Temp 98.6  F (37  C) (Oral)  Wt 74.7 kg (164 lb 9.6 oz)  SpO2 97%  BMI 28.25 kg/m2    Physical Exam:  General: NAD, fatigue, occasional dry cough  HEENT: Oral mucosa moist and non-erythematous, PERRLA, EOM intact, no significant pain with palpation of frontal and maxillary sinuses bilaterally.  CV: RRR, normal S1S2, no m/c/r  Resp: Good respiratory effort no wheezes in right upper and lower lung fields, left upper and lower mild wheezes, no crackles appreciated bilaterally  Abd: Soft, non-tender, BS+, no masses appreciated  Extremities: WWP, no pedal edema  Neuro: AAOx3, no lateralizing symptoms or focal neurologic deficits    Assessment/Plan:  Andrew was seen today for ER f/u for upper respiratory infection, results and smoking cessation.    # Encounter for smoking cessation counseling:  Patient continues to actively smoke, reports trial of several nicotine replacement medications.  Endorses no active depressive symptoms or significant anxiety.  - CHANTIX STARTING MONTH OLIVERIO 0.5 MG X 11 & 1 MG X 42 tablet; Dispense starter pack as written  - Patient instructed about possible mood altering side effects associated with medication.    # Cough:  # Upper Respiratory Infection:  Patient presenting post complete antibiotic course, appears to be a resolving viral/possible bacterial respiratory infection.  Patient endorses significant improvement but continues to have drainage, mild cough and fatigue.  Patient also continues to actively smoke likely lengthening course of recovery.  Will concentrate on symptom management including reducing airway inflammation and encouraging continued smoking cessation.  Patient encouraged to reach out to clinic if symptoms acutely worsen or if symptoms are not significantly improved in 1 week.  - Albuterol (PROAIR HFA/PROVENTIL HFA/VENTOLIN HFA) 108 (90 Base) MCG/ACT inhaler; Inhale 2 puffs into the lungs every 6 hours as needed for shortness of breath /  dyspnea or wheezing  - Montelukast (SINGULAIR) 10 MG tablet; Take 1 tablet (10 mg) by mouth At Bedtime    Health Maintenance: None completed at this appointment    Follow-up: As needed    Patient discussed with Dr. Clive Ghotra MD, A  Internal Medicine PGY-3  [facility]  Pager: [phone]       Attending Addendum:  Patient seen and examined with resident in clinic today.  Pertinent portions of history and exam were independently verified by myself.  I agree with the exam and plan as outlined above with the following modifications: none.  Kate Duffy MD  Internal Medicine

## 2018-11-09 LAB
HIV1 RNA # PLAS NAA DL=20: 431 {COPIES}/ML
HIV1 RNA SERPL NAA+PROBE-LOG#: 2.6 {LOG_COPIES}/ML

## 2018-11-13 ENCOUNTER — OFFICE VISIT (OUTPATIENT)
Dept: SURGERY | Facility: CLINIC | Age: 53
End: 2018-11-13
Payer: COMMERCIAL

## 2018-11-13 VITALS
SYSTOLIC BLOOD PRESSURE: 127 MMHG | TEMPERATURE: 98.2 F | BODY MASS INDEX: 29.32 KG/M2 | HEART RATE: 112 BPM | DIASTOLIC BLOOD PRESSURE: 78 MMHG | WEIGHT: 170.8 LBS | OXYGEN SATURATION: 98 %

## 2018-11-13 DIAGNOSIS — K56.609 SMALL BOWEL OBSTRUCTION (H): Primary | ICD-10-CM

## 2018-11-13 ASSESSMENT — PAIN SCALES - GENERAL: PAINLEVEL: NO PAIN (0)

## 2018-11-13 NOTE — LETTER
2018       RE: Andrew Lockwood  [address]  [facility] [address]     Dear Colleague,    Thank you for referring your patient, Andrew Lockwood, to the [facility] COLON AND RECTAL SURGERY at [facility]. Please see a copy of my visit note below.    Colon and Rectal Surgery Clinic Note    RE: Andrew Lockwood  : 1967  CHRISSY: 2018    Andrew Lockwood is a very pleasant 50 year old male HIV positive male with DM II who presents today for recurrent small bowel obstructions.    HPI: Andrew has a history of chronic abdominal pain with recurrent small bowel obstructions. He has had two exploratory laparotomies in the past with no source of obstruction noted on either occasion. CT on 10/5/18 showed focal small bowel enteritis with associated wall thickening and narrowing small bowel dilation proximal to the inflammation. He has had chronic constipation since childhood. He has had negative workup for Crohn's disease including negative EGD and colonoscopy. He had appendectomy earlier this year with pathology consistent for acute appendicitis. Andrew complains of heartburn and takes prilosec, zantac, and tums for this.  Of note, Andrew had anal cytology with Carrie Jain NP in  showing LSIL   Family history of sister with Crohn's disease.  Social: homeless. Current half pack per day smoker. Marijuana use.    Physical examination:  Examination was chaperoned by Carrie Jain NP      Vitals: /78 (BP Location: Left arm, Patient Position: Sitting, Cuff Size: Adult Regular)  Pulse 112  Temp 98.2  F (36.8  C)  Wt 170 lb 12.8 oz  SpO2 98%  BMI 29.32 kg/m2  BMI= Body mass index is 29.32 kg/(m^2).    Alert, oriented, in no acute distress, sitting comfortably.    Laboratory data:    Recent Labs   Lab Test  18   1055   18   0901   WBC  7.0   < >  8.4   HGB  13.5   < >  10.9*   PLT  249   < >  254   CR  0.86   < >  0.66   ALBUMIN  3.3*   < >    --    BILITOTAL  0.6   < >   --    ALKPHOS  114   < >   --    ALT  29   < >   --    AST  24   < >   --    INR   --    --   1.06    < > = values in this interval not displayed.       Assessment/Plan: Unexplained recurrent small bowel obstructions raising a question of possible stricture but this is thought to be not reliably reachable by balloon enteroscopy. Had discussed possible laparotomy assisted enteroscopy versus further workup with MRE or capsule endoscopy. He was discussed at the multidisciplinary GI luminal care conference and decided the best course would be further workup with capsule study or MRE before considering surgical intervention. Will have him continue to follow with Dr. Molina for this.   Will get him set up for high resolution anoscopy with Carrie Jain NP for his anal cytology showing LSIL.  Patient's questions were answered to his stated satisfaction and he is in agreement with this plan.    Total face to face time was 10 minutes, >50% counseling.    For details of past medical history, surgical history, family history, medications, allergies, and review of systems, please see details below.    Medical history:  Past Medical History:   Diagnosis Date     AIDS (H)      Allergic rhinitis due to other allergen     DNS     Chronic abdominal pain      CNS toxoplasmosis (H)      Diabetes type 2, controlled (H)      GERD (gastroesophageal reflux disease)      HIV (human immunodeficiency virus infection) (H)      Periungual wart      Sleep apnea     doesn't use CPAP       Surgical history:  Past Surgical History:   Procedure Laterality Date     C NONSPECIFIC PROCEDURE      right forearm fracture     COLONOSCOPY Left 1/22/2016    Procedure: COMBINED COLONOSCOPY, SINGLE OR MULTIPLE BIOPSY/POLYPECTOMY BY BIOPSY;  Surgeon: Clark Saini MD;  Location:  GI     HC EXPLORE UNDESC TESTIS,INGUIN/SCROTAL       LAPAROSCOPIC APPENDECTOMY N/A 1/31/2018    Procedure: LAPAROSCOPIC  APPENDECTOMY;  LAPAROSCOPIC APPENDECTOMY;  Surgeon: Dawn Holt MD;  [facility]     LAPAROSCOPY DIAGNOSTIC (GENERAL) N/A 7/26/2016    Procedure: LAPAROSCOPY DIAGNOSTIC (GENERAL);  Surgeon: Susannah Arriaag MD;  [facility]     LAPAROSCOPY DIAGNOSTIC (GENERAL) N/A 4/16/2018    Procedure: LAPAROSCOPY DIAGNOSTIC (GENERAL);  Diagnostic laparoscopy and lysis of adhesions;  Surgeon: Prince Dowling MD;  [facility]: [facility]     OPTICAL TRACKING SYSTEM CRANIOTOMY, EXCISE TUMOR, COMBINED Left 4/10/2015    Procedure: COMBINED OPTICAL TRACKING SYSTEM CRANIOTOMY, EXCISE TUMOR;  Surgeon: Mirlande Colmenares MD;  [facility]: [facility]     REPAIR GAMEKEEPER'S THUMB Right 12/2/2016    Procedure: REPAIR LIGAMENT ULNAR COLLATERAL THUMB (GAMEKEEPER'S);  Surgeon: Evin Zamorano MD;  [facility]       Family history:  Family History   Problem Relation Age of Onset     Diabetes Brother      Diabetes Father      Alzheimer Disease Father      Unknown/Adopted Mother      Diabetes Paternal Grandfather      Cancer No family hx of      no skin cancer     Skin Cancer No family hx of      no famiy hx of skin cancer     Glaucoma No family hx of      Macular Degeneration No family hx of        Medications:  Current Outpatient Prescriptions   Medication Sig Dispense Refill     BASAGLAR 100 UNIT/ML injection Inject 20 Units Subcutaneous daily 15 mL 0     Bictegravir-Emtricitab-Tenofov -25 MG TABS Take 1 tablet by mouth daily 30 tablet 11     calcium carbonate (TUMS) 500 MG chewable tablet Take 2 tablets (1,000 mg) by mouth 3 times daily as needed for heartburn 150 tablet 3     empagliflozin (JARDIANCE) 10 MG TABS tablet Take 1 tablet by mouth daily 90 tablet 3     gabapentin (NEURONTIN) 100 MG capsule Take 2 capsules (200 mg) by mouth 3 times daily 90 capsule 0     hyoscyamine 0.125 MG TBDP Take 2 tablets (0.25 mg) by mouth 4 times daily as needed for cramping 60 tablet 0     insulin glargine (LANTUS  SOLOSTAR) 100 UNIT/ML pen Inject 20 Units Subcutaneous every morning 15 mL 0     omeprazole (PRILOSEC) 20 MG CR capsule Take 1 capsule (20 mg) by mouth 2 times daily 180 capsule 3     ranitidine (ZANTAC) 150 MG tablet Take 1 tablet (150 mg) by mouth 2 times daily 60 tablet 3     simethicone (MYLICON) 80 MG chewable tablet Take 1 tablet (80 mg) by mouth 4 times daily 30 tablet 0     albuterol (PROAIR HFA/PROVENTIL HFA/VENTOLIN HFA) 108 (90 Base) MCG/ACT inhaler Inhale 2 puffs into the lungs every 6 hours as needed for shortness of breath / dyspnea or wheezing (Patient not taking: Reported on 11/13/2018) 1 Inhaler 1     blood glucose (NO BRAND SPECIFIED) lancets standard Use to test blood sugar four times daily or as directed. (Patient not taking: Reported on 11/13/2018) 100 Box 5     blood glucose monitoring (NO BRAND SPECIFIED) meter device kit Use to test blood sugar 4 times daily or as directed. (Patient not taking: Reported on 11/13/2018) 1 kit 0     blood glucose monitoring (NO BRAND SPECIFIED) test strip 1 strip by In Vitro route 4 times daily (before meals and nightly) Use to test blood sugars 4 times daily or as directed (Patient not taking: Reported on 11/13/2018) 100 strip 11     CHANTIX STARTING MONTH OLIVERIO 0.5 MG X 11 & 1 MG X 42 tablet Dispense starter pack as written (Patient not taking: Reported on 11/13/2018) 1 tablet 0     glycerin, adult, 2 g SUPP Suppository Place 1 suppository rectally daily as needed for constipation 5 suppository 0     ibuprofen (ADVIL/MOTRIN) 200 MG tablet Take 2 tablets (400 mg) by mouth every 8 hours as needed for pain (Patient not taking: Reported on 11/13/2018) 60 tablet 0     insulin pen needle (B-D U/F) 31G X 8 MM Use 1 pen needles daily or as directed. (Patient not taking: Reported on 11/13/2018) 100 each 0     magnesium oxide (MAG-OX) 400 MG tablet Take 1 tablet (400 mg) by mouth daily (Patient not taking: Reported on 11/13/2018) 30 tablet 0     montelukast (SINGULAIR) 10  MG tablet Take 1 tablet (10 mg) by mouth At Bedtime (Patient not taking: Reported on 11/13/2018) 30 tablet 1     ondansetron (ZOFRAN-ODT) 4 MG ODT tab Take 1 tablet (4 mg) by mouth 2 times daily (Patient not taking: Reported on 11/13/2018) 6 tablet 0     pseudoePHEDrine (SUDAFED) 30 MG tablet Take 2 tablets (60 mg) by mouth every 6 hours as needed for congestion (Patient not taking: Reported on 11/13/2018) 20 tablet 0       Allergies:  The patientis allergic to metformin; milk [lac bovis]; tylenol [acetaminophen]; and dulaglutide.    Social history:  Social History   Substance Use Topics     Smoking status: Current Every Day Smoker     Packs/day: 0.50     Types: Cigarettes     Last attempt to quit: 10/28/2016     Smokeless tobacco: Former User     Alcohol use No      Comment: Last etoh in 2007     Marital status: single.    Review of Systems:  Nursing Notes:   Savannah Hagen CMA  11/13/2018  9:51 AM  Signed  Chief Complaint   Patient presents with     Consult     Surgery for stricture.     Referral     Per Dr. Molina.       Vitals:    11/13/18 0939   BP: 127/78   BP Location: Left arm   Patient Position: Sitting   Cuff Size: Adult Regular   Pulse: 112   Temp: 98.2  F (36.8  C)   SpO2: 98%   Weight: 170 lb 12.8 oz       Body mass index is 29.32 kg/(m^2).      Savannah Hagen, EMT                             Thanh Lundberg MD   Professor and Chief  Division of Colon and Rectal Surgery  [facility]      Referring Provider:  Suly Molina MD  [address] [place] [address] [id number]  [place] [address]     Primary Care Provider:  Caitie Lamb    Again, thank you for allowing me to participate in the care of your patient.      Sincerely,    Thanh Lundberg MD

## 2018-11-13 NOTE — NURSING NOTE
Chief Complaint   Patient presents with     Consult     Surgery for stricture.     Referral     Per Dr. Molina.       Vitals:    11/13/18 0939   BP: 127/78   BP Location: Left arm   Patient Position: Sitting   Cuff Size: Adult Regular   Pulse: 112   Temp: 98.2  F (36.8  C)   SpO2: 98%   Weight: 170 lb 12.8 oz       Body mass index is 29.32 kg/(m^2).      Savannah Hagen, EMT

## 2018-11-13 NOTE — MR AVS SNAPSHOT
After Visit Summary   11/13/2018    Andrew Lockwood    MRN: [id number]           Patient Information     Date Of Birth          11/27/1967        Visit Information        Provider Department      11/13/2018 10:00 AM Thanh Lundberg MD [facility]al Surgery        Today's Diagnoses     Small bowel obstruction (H)    -  1       Follow-ups after your visit        Your next 10 appointments already scheduled     Nov 14, 2018  2:30 PM CST   (Arrive by 2:15 PM)   Return Visit with Florence Carbajal MD   [facility] ([facility])    [address]  [address]  [facility] [address] [phone]            Jan 03, 2019  2:00 PM CST   (Arrive by 1:45 PM)   Microscopy with VIKASH [facility] ([facility] [place])    [address]  4th Floor  [facility] [address] [phone]              Who to contact     Please call your clinic at [phone] to:    Ask questions about your health    Make or cancel appointments    Discuss your medicines    Learn about your test results    Speak to your doctor            Additional Information About Your Visit        [employer] Information     [employer] gives you secure access to your electronic health record. If you see a primary care provider, you can also send messages to your care team and make appointments. If you have questions, please call your primary care clinic.  If you do not have a primary care provider, please call [phone] and they will assist you.      [employer] is an electronic gateway that provides easy, online access to your medical records. With [employer], you can request a clinic appointment, read your test results, renew a prescription or communicate with your care team.     To access your existing account, please contact your [facility] or call [phone]  for assistance.        Care EveryWhere ID     This is your Care EveryWhere ID. This could be used by other organizations to access your [place] medical records  [id number]        Your Vitals Were     Pulse Temperature Pulse Oximetry BMI (Body Mass Index)          112 98.2  F (36.8  C) 98% 29.32 kg/m2         Blood Pressure from Last 3 Encounters:   11/13/18 127/78   11/06/18 100/67   11/04/18 111/62    Weight from Last 3 Encounters:   11/13/18 170 lb 12.8 oz   11/06/18 164 lb 9.6 oz   10/30/18 166 lb 11.2 oz              Today, you had the following     No orders found for display       Primary Care Provider Office Phone # Fax #    Caitie Christin Lamb [phone]       [address] [place] [address] [address] [place] [address]        Equal Access to Services     [facility]: Hadii carissa lockwood hadasho Soterence, waaxda luqadaha, qaybta kaalmada adeegyada, dez rick haylinda moreno . So [facility] [phone].    ATENCIÓN: Si habla español, tiene a kohli disposición servicios gratuitos de asistencia lingüística. Llame al [phone].    We comply with applicable federal civil rights laws and [place] laws. We do not discriminate on the basis of race, color, national origin, age, disability, sex, sexual orientation, or gender identity.            Thank you!     Thank you for choosing [facility] COLON AND RECTAL SURGERY  for your care. Our goal is always to provide you with excellent care. Hearing back from our patients is one way we can continue to improve our services. Please take a few minutes to complete the written survey that you may receive in the mail after your visit with us. Thank you!             Your Updated Medication List - Protect others around you: Learn how to safely use, store and throw away your medicines at www.disposemymeds.org.          This list is accurate as of 11/13/18 11:59 PM.  Always use your most recent med list.                   Brand Name Dispense Instructions for use  Diagnosis    albuterol 108 (90 Base) MCG/ACT inhaler    PROAIR HFA/PROVENTIL HFA/VENTOLIN HFA    1 Inhaler    Inhale 2 puffs into the lungs every 6 hours as needed for shortness of breath / dyspnea or wheezing    Cough       bictegravir-emtricitabine-tenofovir -25 MG per tablet    BIKTARVY    30 tablet    Take 1 tablet by mouth daily    HIV (human immunodeficiency virus infection) (H)       blood glucose lancets standard    no brand specified    100 Box    Use to test blood sugar four times daily or as directed.    Type 2 diabetes mellitus without complication, without long-term current use of insulin (H)       blood glucose monitoring meter device kit    no brand specified    1 kit    Use to test blood sugar 4 times daily or as directed.    Type 2 diabetes mellitus with complication, without long-term current use of insulin (H)       blood glucose monitoring test strip    no brand specified    100 strip    1 strip by In Vitro route 4 times daily (before meals and nightly) Use to test blood sugars 4 times daily or as directed    Type 2 diabetes mellitus with complication, without long-term current use of insulin (H)       calcium carbonate 500 MG chewable tablet    TUMS    150 tablet    Take 2 tablets (1,000 mg) by mouth 3 times daily as needed for heartburn    Gastroesophageal reflux disease with esophagitis       CHANTIX STARTING MONTH OLIVERIO 0.5 MG X 11 & 1 MG X 42 tablet   Generic drug:  varenicline     1 tablet    Dispense starter pack as written    Encounter for smoking cessation counseling       empagliflozin 10 MG Tabs tablet    JARDIANCE    90 tablet    Take 1 tablet by mouth daily    Diabetes mellitus, type 2 (H)       gabapentin 100 MG capsule    NEURONTIN    90 capsule    Take 2 capsules (200 mg) by mouth 3 times daily    Chronic abdominal pain       glycerin (adult) 2 g Supp Suppository     5 suppository    Place 1 suppository rectally daily as needed for constipation    Constipation, unspecified  constipation type       hyoscyamine 0.125 MG Tbdp     60 tablet    Take 2 tablets (0.25 mg) by mouth 4 times daily as needed for cramping    Chronic abdominal pain       ibuprofen 200 MG tablet    ADVIL/MOTRIN    60 tablet    Take 2 tablets (400 mg) by mouth every 8 hours as needed for pain        * insulin glargine 100 UNIT/ML injection    LANTUS SOLOSTAR    15 mL    Inject 20 Units Subcutaneous every morning    Type 2 diabetes mellitus with hyperglycemia, without long-term current use of insulin (H)       * BASAGLAR 100 UNIT/ML injection     15 mL    Inject 20 Units Subcutaneous daily    Type 2 diabetes mellitus with hyperglycemia, without long-term current use of insulin (H)       insulin pen needle 31G X 8 MM    B-D U/F    100 each    Use 1 pen needles daily or as directed.    Type 2 diabetes mellitus without complication, without long-term current use of insulin (H)       magnesium oxide 400 MG tablet    MAG-OX    30 tablet    Take 1 tablet (400 mg) by mouth daily    Constipation, unspecified constipation type       montelukast 10 MG tablet    SINGULAIR    30 tablet    Take 1 tablet (10 mg) by mouth At Bedtime    Cough       omeprazole 20 MG CR capsule    priLOSEC    180 capsule    Take 1 capsule (20 mg) by mouth 2 times daily    Heart burn       ondansetron 4 MG ODT tab    ZOFRAN-ODT    6 tablet    Take 1 tablet (4 mg) by mouth 2 times daily    Nausea       pseudoePHEDrine 30 MG tablet    SUDAFED    20 tablet    Take 2 tablets (60 mg) by mouth every 6 hours as needed for congestion        ranitidine 150 MG tablet    ZANTAC    60 tablet    Take 1 tablet (150 mg) by mouth 2 times daily    Heart burn       simethicone 80 MG chewable tablet    MYLICON    30 tablet    Take 1 tablet (80 mg) by mouth 4 times daily    Chronic abdominal pain       * Notice:  This list has 2 medication(s) that are the same as other medications prescribed for you. Read the directions carefully, and ask your doctor or other care provider  to review them with you.

## 2018-11-20 ENCOUNTER — APPOINTMENT (OUTPATIENT)
Dept: CT IMAGING | Facility: CLINIC | Age: 53
End: 2018-11-20
Attending: EMERGENCY MEDICINE
Payer: COMMERCIAL

## 2018-11-20 ENCOUNTER — HOSPITAL ENCOUNTER (EMERGENCY)
Facility: CLINIC | Age: 53
Discharge: HOME | End: 2018-11-20
Attending: EMERGENCY MEDICINE | Admitting: EMERGENCY MEDICINE
Payer: COMMERCIAL

## 2018-11-20 VITALS
RESPIRATION RATE: 16 BRPM | HEART RATE: 83 BPM | DIASTOLIC BLOOD PRESSURE: 88 MMHG | OXYGEN SATURATION: 99 % | WEIGHT: 171 LBS | BODY MASS INDEX: 29.19 KG/M2 | TEMPERATURE: 98.1 F | SYSTOLIC BLOOD PRESSURE: 126 MMHG | HEIGHT: 64 IN

## 2018-11-20 DIAGNOSIS — S02.85XA CLOSED FRACTURE OF ORBIT, INITIAL ENCOUNTER (H): ICD-10-CM

## 2018-11-20 DIAGNOSIS — S02.31XA: ICD-10-CM

## 2018-11-20 PROCEDURE — 99284 EMERGENCY DEPT VISIT MOD MDM: CPT | Mod: 25

## 2018-11-20 PROCEDURE — 70450 CT HEAD/BRAIN W/O DYE: CPT

## 2018-11-20 PROCEDURE — 70486 CT MAXILLOFACIAL W/O DYE: CPT

## 2018-11-20 PROCEDURE — 99284 EMERGENCY DEPT VISIT MOD MDM: CPT | Mod: Z6 | Performed by: EMERGENCY MEDICINE

## 2018-11-20 RX ORDER — METHYLPREDNISOLONE 4 MG
TABLET, DOSE PACK ORAL
Qty: 21 TABLET | Refills: 0 | Status: SHIPPED | OUTPATIENT
Start: 2018-11-20 | End: 2019-04-15

## 2018-11-20 ASSESSMENT — ENCOUNTER SYMPTOMS
HEADACHES: 1
VOMITING: 0
ACTIVITY CHANGE: 1
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
## 2018-11-20 NOTE — ED AVS SNAPSHOT
[facility], Emergency Department    [address] [facility] [address]    Phone:  [phone]                                       Andrew Lockwood   MRN: [id number]    Department:  [facility], Emergency Department   Date of Visit:  11/20/2018           Patient Information     Date Of Birth          11/27/1967        Your diagnoses for this visit were:     None       You were seen by Tahmina Barkley MD.        Discharge Instructions         Please make an appointment to follow up with Eye Clinic (phone: [phone]) in 3-6 days, before seeing the ENT doctor.  Please make an appointment to follow up with [facility] (phone: [phone]) in 7 days, ask for either Dr Guallpa or Dr Cam.    Make sure to avoid blowing your nose, or using straws to drink. If you have to sneeze, keep your mouth open.       Facial Fracture   You have a broken bone, or fracture, in your face. This may be a small crack in the bone. Or it may be a major break, with the bone moved out of place.  Depending on where the break is, you may have pain when you chew. You may also have nasal congestion, sinus pain, and nose bleeding.   During the first 24 hours after injury, you may have swelling or bruising where the break is, or around your eyes. A blow to the face strong enough to cause a broken bone may also cause a concussion or more serious brain injury.  Home care    Use an ice pack on the injured area for no more than 15 to 20 minutes at a time. Do this every 1 to 2 hours for the first 24 to 48 hours. Then use the ice pack as needed to ease pain and swelling. To make an ice pack, put ice cubes in a plastic bag that seals at the top. Wrap the bag in a clean, thin towel or cloth. Never put ice or an ice pack directly on the skin.    You may use over-the-counter pain medicine to control pain, unless another pain medicine was prescribed. Talk with your provider before using this medicine if you have chronic liver  or kidney disease or a history of gastrointenstinal ulcers.    Sleep with your head raised on 2 or more pillows to ease swelling.    If you have facial pain when eating, don t eat crunchy or chewy foods. A softer diet will be more comfortable for the first 2 to 3 weeks.    If you were given antibiotics to prevent an infection, take them as directed until you have finished the prescription.    If your nose bleeds, sit up and lean forward. Pinch your nostrils together for 10 to 15 minutes. If the bleeding doesn t stop, keep pinching your nostrils and call your healthcare provider. Don t blow your nose for 12 hours after the bleeding stops. This will allow a strong blood clot to form. Don t pick your nose.  Special note on concussions  If you had any symptoms of a concussion today, don t return to sports or any activity that could result in another head injury.  These are symptoms of a concussion:    Nausea    Vomiting    Dizziness    Confusion    Headache    Memory loss    Loss of consciousness  Wait until all of your symptoms are gone and your provider says it s OK to resume your activity. Having a second head injury before you fully recover from the first one can lead to serious brain injury.  Follow-up care  Follow up with your healthcare provider in 1 week, or as advised. This is to make sure the bone is healing as it should.  If you had X-rays or CT scans taken, you will be told of any new findings that may affect your care.  When to seek medical advice  Call your healthcare provider right away if any of these occur:    Swelling or pain in your face that gets worse    Redness, warmth, or pus draining from the injured area    Fever of 100.4 F (38 C) or higher, or as directed by your healthcare provider    Chills    Double vision    Nausea  Call 911   Call 911 if you have:    Repeated vomiting    Severe headache or dizziness    Headache or dizziness that gets worse    Abnormal drowsiness, or you are unable to wake  up as usual    Confusion or change in behavior or speech    Convulsion, or seizure   Date Last Reviewed: 4/1/2018 2000-2018 [employer]. [address] [place] [address], [place] [address]. All rights reserved. This information is not intended as a substitute for professional medical care. Always follow your healthcare professional's instructions.            Your next 10 appointments already scheduled     Jan 03, 2019  2:00 PM CST   (Arrive by 1:45 PM)   Microscopy with VIKASH Eden [facility] ([facility])    [address] [place] [address]  [address] [facility] [address] [phone]              24 Hour Appointment Hotline       To make an appointment at any [facility], call [phone] ([phone]). If you don't have a family doctor or clinic, we will help you find one. [facility] are conveniently located to serve the needs of you and your family.             Review of your medicines      START taking        Dose / Directions Last dose taken    methylPREDNISolone 4 MG tablet   Commonly known as:  MEDROL DOSEPAK   Quantity:  21 tablet        Follow package instructions   Refills:  0          Our records show that you are taking the medicines listed below. If these are incorrect, please call your family doctor or clinic.        Dose / Directions Last dose taken    albuterol 108 (90 Base) MCG/ACT inhaler   Commonly known as:  PROAIR HFA/PROVENTIL HFA/VENTOLIN HFA   Dose:  2 puff   Quantity:  1 Inhaler        Inhale 2 puffs into the lungs every 6 hours as needed for shortness of breath / dyspnea or wheezing   Refills:  1        bictegravir-emtricitabine-tenofovir -25 MG per tablet   Commonly known as:  BIKTARVY   Dose:  1 tablet   Quantity:  30 tablet        Take 1 tablet by mouth daily   Refills:  11        blood glucose lancets standard   Commonly known as:  no brand specified   Quantity:  100 Box        Use to  test blood sugar four times daily or as directed.   Refills:  5        blood glucose monitoring meter device kit   Commonly known as:  no brand specified   Quantity:  1 kit        Use to test blood sugar 4 times daily or as directed.   Refills:  0        blood glucose monitoring test strip   Commonly known as:  no brand specified   Dose:  1 strip   Quantity:  100 strip        1 strip by In Vitro route 4 times daily (before meals and nightly) Use to test blood sugars 4 times daily or as directed   Refills:  11        calcium carbonate 500 MG chewable tablet   Commonly known as:  TUMS   Dose:  2 chew tab   Quantity:  150 tablet        Take 2 tablets (1,000 mg) by mouth 3 times daily as needed for heartburn   Refills:  3        CHANTIX STARTING MONTH OLIVERIO 0.5 MG X 11 & 1 MG X 42 tablet   Quantity:  1 tablet   Generic drug:  varenicline        Dispense starter pack as written   Refills:  0        empagliflozin 10 MG Tabs tablet   Commonly known as:  JARDIANCE   Quantity:  90 tablet        Take 1 tablet by mouth daily   Refills:  3        gabapentin 100 MG capsule   Commonly known as:  NEURONTIN   Dose:  200 mg   Quantity:  90 capsule        Take 2 capsules (200 mg) by mouth 3 times daily   Refills:  0        glycerin (adult) 2 g Supp Suppository   Dose:  1 suppository   Quantity:  5 suppository        Place 1 suppository rectally daily as needed for constipation   Refills:  0        hyoscyamine 0.125 MG Tbdp   Dose:  0.25 mg   Quantity:  60 tablet        Take 2 tablets (0.25 mg) by mouth 4 times daily as needed for cramping   Refills:  0        ibuprofen 200 MG tablet   Commonly known as:  ADVIL/MOTRIN   Dose:  400 mg   Quantity:  60 tablet        Take 2 tablets (400 mg) by mouth every 8 hours as needed for pain   Refills:  0        * insulin glargine 100 UNIT/ML injection   Commonly known as:  LANTUS SOLOSTAR   Dose:  20 Units   Quantity:  15 mL        Inject 20 Units Subcutaneous every morning   Refills:  0        *  insulin glargine 100 UNIT/ML pen   Dose:  20 Units   Quantity:  15 mL        Inject 20 Units Subcutaneous daily   Refills:  0        insulin pen needle 31G X 8 MM miscellaneous   Commonly known as:  B-D U/F   Quantity:  100 each        Use 1 pen needles daily or as directed.   Refills:  0        magnesium oxide 400 MG tablet   Commonly known as:  MAG-OX   Dose:  400 mg   Quantity:  30 tablet        Take 1 tablet (400 mg) by mouth daily   Refills:  0        montelukast 10 MG tablet   Commonly known as:  SINGULAIR   Dose:  10 mg   Quantity:  30 tablet        Take 1 tablet (10 mg) by mouth At Bedtime   Refills:  1        omeprazole 20 MG CR capsule   Commonly known as:  priLOSEC   Dose:  20 mg   Quantity:  180 capsule        Take 1 capsule (20 mg) by mouth 2 times daily   Refills:  3        ondansetron 4 MG ODT tab   Commonly known as:  ZOFRAN-ODT   Dose:  4 mg   Quantity:  6 tablet        Take 1 tablet (4 mg) by mouth 2 times daily   Refills:  0        pseudoePHEDrine 30 MG tablet   Commonly known as:  SUDAFED   Dose:  60 mg   Quantity:  20 tablet        Take 2 tablets (60 mg) by mouth every 6 hours as needed for congestion   Refills:  0        ranitidine 150 MG tablet   Commonly known as:  ZANTAC   Dose:  150 mg   Quantity:  60 tablet        Take 1 tablet (150 mg) by mouth 2 times daily   Refills:  3        simethicone 80 MG chewable tablet   Commonly known as:  MYLICON   Dose:  80 mg   Quantity:  30 tablet        Take 1 tablet (80 mg) by mouth 4 times daily   Refills:  0        * Notice:  This list has 2 medication(s) that are the same as other medications prescribed for you. Read the directions carefully, and ask your doctor or other care provider to review them with you.            Prescriptions were sent or printed at these locations (1 Prescription)                   Other Prescriptions                Printed at Department/Unit printer (1 of 1)         methylPREDNISolone (MEDROL DOSEPAK) 4 MG tablet                 Procedures and tests performed during your visit     CT Facial Bones without Contrast    Head CT w/o contrast      Orders Needing Specimen Collection     None      Pending Results     Date and Time Order Name Status Description    11/20/2018 1513 Head CT w/o contrast Preliminary     11/20/2018 1513 CT Facial Bones without Contrast Preliminary             Pending Culture Results     No orders found from 11/18/2018 to 11/21/2018.            Pending Results Instructions     If you had any lab results that were not finalized at the time of your Discharge, you can call the ED Lab Result RN at [phone]. You will be contacted by this team for any positive Lab results or changes in treatment. The nurses are available 7 days a week from 10A to 6:30P.  You can leave a message 24 hours per day and they will return your call.        Thank you for choosing [facility]       Thank you for choosing [facility] for your care. Our goal is always to provide you with excellent care. Hearing back from our patients is one way we can continue to improve our services. Please take a few minutes to complete the written survey that you may receive in the mail after you visit with us. Thank you!        [employer] Information     [employer] gives you secure access to your electronic health record. If you see a primary care provider, you can also send messages to your care team and make appointments. If you have questions, please call your primary care clinic.  If you do not have a primary care provider, please call [phone] and they will assist you.        Care EveryWhere ID     This is your Care EveryWhere ID. This could be used by other organizations to access your [facility] medical records  [id number]        Equal Access to Services     RA JORDAN : Apollo Cody, maverick centeno, qadez lord. So [facility] [phone].    ATENCIÓN: Si habla español, tiene a kohli disposición  servicios gratuitos de asistencia lingüística. Gurdeep davila [phone].    We comply with applicable federal civil rights laws and [place] laws. We do not discriminate on the basis of race, color, national origin, age, disability, sex, sexual orientation, or gender identity.            After Visit Summary       This is your record. Keep this with you and show to your community pharmacist(s) and doctor(s) at your next visit.

## 2018-11-20 NOTE — ED AVS SNAPSHOT
[facility], [place], Emergency Department    [address] [place] [address]    Phone:  [phone]                                       Andrew Lockwood   MRN: [id number]    Department:  [facility], Emergency Department   Date of Visit:  11/20/2018           After Visit Summary Signature Page     I have received my discharge instructions, and my questions have been answered. I have discussed any challenges I see with this plan with the nurse or doctor.    ..........................................................................................................................................  Patient/Patient Representative Signature      ..........................................................................................................................................  Patient Representative Print Name and Relationship to Patient    ..................................................               ................................................  Date                                   Time    ..........................................................................................................................................  Reviewed by Signature/Title    ...................................................              ..............................................  Date                                               Time          22EPIC Rev 08/18

## 2018-11-21 NOTE — TELEPHONE ENCOUNTER
FUTURE VISIT INFORMATION      FUTURE VISIT INFORMATION:    Date: 11/28/2018    Time: 11:00    Location: [facility]  REFERRAL INFORMATION:    Referring provider:  Opal Blank    Referring providers clinic:  [facility]    Reason for visit/diagnosis  right orbital fracture     RECORDS REQUESTED FROM:       Clinic name Comments Records Status Imaging Status   [facility] OFFICE VISIT: 11/20/2018  IMAGE: CT FACIAL BONES 11/20/2018, HEAD CT 11/20/2018 INTERNAL YES

## 2018-11-21 NOTE — DISCHARGE INSTRUCTIONS
Please make an appointment to follow up with Eye Clinic (phone: [phone]) in 3-6 days, before seeing the ENT doctor.  Please make an appointment to follow up with [facility] (phone: [phone]) in 7 days, ask for either Dr Guallpa or Dr Cam.    Make sure to avoid blowing your nose, or using straws to drink. If you have to sneeze, keep your mouth open.       Facial Fracture   You have a broken bone, or fracture, in your face. This may be a small crack in the bone. Or it may be a major break, with the bone moved out of place.  Depending on where the break is, you may have pain when you chew. You may also have nasal congestion, sinus pain, and nose bleeding.   During the first 24 hours after injury, you may have swelling or bruising where the break is, or around your eyes. A blow to the face strong enough to cause a broken bone may also cause a concussion or more serious brain injury.  Home care    Use an ice pack on the injured area for no more than 15 to 20 minutes at a time. Do this every 1 to 2 hours for the first 24 to 48 hours. Then use the ice pack as needed to ease pain and swelling. To make an ice pack, put ice cubes in a plastic bag that seals at the top. Wrap the bag in a clean, thin towel or cloth. Never put ice or an ice pack directly on the skin.    You may use over-the-counter pain medicine to control pain, unless another pain medicine was prescribed. Talk with your provider before using this medicine if you have chronic liver or kidney disease or a history of gastrointenstinal ulcers.    Sleep with your head raised on 2 or more pillows to ease swelling.    If you have facial pain when eating, don t eat crunchy or chewy foods. A softer diet will be more comfortable for the first 2 to 3 weeks.    If you were given antibiotics to prevent an infection, take them as directed until you have finished the prescription.    If your nose bleeds, sit up and lean forward. Pinch your  nostrils together for 10 to 15 minutes. If the bleeding doesn t stop, keep pinching your nostrils and call your healthcare provider. Don t blow your nose for 12 hours after the bleeding stops. This will allow a strong blood clot to form. Don t pick your nose.  Special note on concussions  If you had any symptoms of a concussion today, don t return to sports or any activity that could result in another head injury.  These are symptoms of a concussion:    Nausea    Vomiting    Dizziness    Confusion    Headache    Memory loss    Loss of consciousness  Wait until all of your symptoms are gone and your provider says it s OK to resume your activity. Having a second head injury before you fully recover from the first one can lead to serious brain injury.  Follow-up care  Follow up with your healthcare provider in 1 week, or as advised. This is to make sure the bone is healing as it should.  If you had X-rays or CT scans taken, you will be told of any new findings that may affect your care.  When to seek medical advice  Call your healthcare provider right away if any of these occur:    Swelling or pain in your face that gets worse    Redness, warmth, or pus draining from the injured area    Fever of 100.4 F (38 C) or higher, or as directed by your healthcare provider    Chills    Double vision    Nausea  Call 911   Call 911 if you have:    Repeated vomiting    Severe headache or dizziness    Headache or dizziness that gets worse    Abnormal drowsiness, or you are unable to wake up as usual    Confusion or change in behavior or speech    Convulsion, or seizure   Date Last Reviewed: 4/1/2018 2000-2018 [employer]. [address] [place] [address], [place] [address]. All rights reserved. This information is not intended as a substitute for professional medical care. Always follow your healthcare professional's instructions.

## 2018-11-27 DIAGNOSIS — K56.0 ADYNAMIC ILEUS (H): Primary | ICD-10-CM

## 2018-11-28 ENCOUNTER — PRE VISIT (OUTPATIENT)
Dept: OTOLARYNGOLOGY | Facility: CLINIC | Age: 53
End: 2018-11-28

## 2018-11-30 ENCOUNTER — TELEPHONE (OUTPATIENT)
Dept: OPHTHALMOLOGY | Facility: CLINIC | Age: 53
End: 2018-11-30

## 2018-11-30 ENCOUNTER — HOSPITAL ENCOUNTER (EMERGENCY)
Facility: CLINIC | Age: 53
Discharge: HOME | End: 2018-11-30
Attending: EMERGENCY MEDICINE | Admitting: EMERGENCY MEDICINE
Payer: COMMERCIAL

## 2018-11-30 VITALS
SYSTOLIC BLOOD PRESSURE: 135 MMHG | RESPIRATION RATE: 16 BRPM | HEART RATE: 90 BPM | DIASTOLIC BLOOD PRESSURE: 85 MMHG | TEMPERATURE: 97.8 F | OXYGEN SATURATION: 96 %

## 2018-11-30 DIAGNOSIS — H10.31 ACUTE CONJUNCTIVITIS OF RIGHT EYE, UNSPECIFIED ACUTE CONJUNCTIVITIS TYPE: ICD-10-CM

## 2018-11-30 PROCEDURE — 99282 EMERGENCY DEPT VISIT SF MDM: CPT | Performed by: EMERGENCY MEDICINE

## 2018-11-30 PROCEDURE — 99283 EMERGENCY DEPT VISIT LOW MDM: CPT | Mod: Z6 | Performed by: EMERGENCY MEDICINE

## 2018-11-30 RX ORDER — POLYMYXIN B SULFATE AND TRIMETHOPRIM 1; 10000 MG/ML; [USP'U]/ML
1 SOLUTION OPHTHALMIC
Qty: 1 BOTTLE | Refills: 0 | Status: SHIPPED | OUTPATIENT
Start: 2018-11-30 | End: 2018-11-30

## 2018-11-30 RX ORDER — PROPARACAINE HYDROCHLORIDE 5 MG/ML
1 SOLUTION/ DROPS OPHTHALMIC ONCE
Status: DISCONTINUED | OUTPATIENT
Start: 2018-11-30 | End: 2018-11-30 | Stop reason: HOSPADM

## 2018-11-30 RX ORDER — POLYMYXIN B SULFATE AND TRIMETHOPRIM 1; 10000 MG/ML; [USP'U]/ML
1 SOLUTION OPHTHALMIC
Qty: 1 BOTTLE | Refills: 0 | Status: SHIPPED | OUTPATIENT
Start: 2018-11-30 | End: 2019-04-15

## 2018-11-30 ASSESSMENT — ENCOUNTER SYMPTOMS
CHILLS: 0
COUGH: 0
PHOTOPHOBIA: 0
WEAKNESS: 0
FACIAL SWELLING: 0
SINUS PRESSURE: 0
EYE DISCHARGE: 1
EYE ITCHING: 1
HEADACHES: 0
FEVER: 0
EYE REDNESS: 1

## 2018-11-30 ASSESSMENT — VISUAL ACUITY
OD: 20/25
OS: 20/50

## 2018-11-30 NOTE — ED AVS SNAPSHOT
[facility], Emergency Department    [address] [address]    Phone:  [phone]                                       Andrew Lockwood   MRN: [id number]    Department:  [facility], Emergency Department   Date of Visit:  11/30/2018           Patient Information     Date Of Birth          11/27/1967        Your diagnoses for this visit were:     Acute conjunctivitis of right eye, unspecified acute conjunctivitis type        You were seen by Wojciech Mackay MD.        Discharge Instructions         Use polytrim eyedrops in right eye as directed.    Please make an appointment to follow up with Eye Clinic (phone: [phone]) and [facility] (phone: [phone]).     Return to the emergency department if fever, difficulty with your vision, double vision, worse, or other concerns.        Discharge References/Attachments     CONJUNCTIVITIS CAUSED BY INFECTION (ENGLISH)      Your next 10 appointments already scheduled     Jan 03, 2019  2:00 PM CST   (Arrive by 1:45 PM)   Microscopy with [facility] ([facility])    [address] [place] [address] [phone]              24 Hour Appointment Hotline       To make an appointment at any [facility], call [phone] ([phone]). If you don't have a family doctor or clinic, we will help you find one. [facility] are conveniently located to serve the needs of you and your family.             Review of your medicines      START taking        Dose / Directions Last dose taken    trimethoprim-polymyxin b 31636-9.1 UNIT/ML-% ophthalmic solution   Commonly known as:  POLYTRIM   Dose:  1 drop   Quantity:  1 Bottle        Place 1 drop into the right eye every 3 hours . Use while awake.   Refills:  0          Our records show that you are taking the medicines listed below. If these are incorrect, please call your  family doctor or clinic.        Dose / Directions Last dose taken    albuterol 108 (90 Base) MCG/ACT inhaler   Commonly known as:  PROAIR HFA/PROVENTIL HFA/VENTOLIN HFA   Dose:  2 puff   Quantity:  1 Inhaler        Inhale 2 puffs into the lungs every 6 hours as needed for shortness of breath / dyspnea or wheezing   Refills:  1        bictegravir-emtricitabine-tenofovir -25 MG per tablet   Commonly known as:  BIKTARVY   Dose:  1 tablet   Quantity:  30 tablet        Take 1 tablet by mouth daily   Refills:  11        blood glucose lancets standard   Commonly known as:  NO BRAND SPECIFIED   Quantity:  100 Box        Use to test blood sugar four times daily or as directed.   Refills:  5        blood glucose monitoring meter device kit   Commonly known as:  NO BRAND SPECIFIED   Quantity:  1 kit        Use to test blood sugar 4 times daily or as directed.   Refills:  0        blood glucose monitoring test strip   Commonly known as:  NO BRAND SPECIFIED   Dose:  1 strip   Quantity:  100 strip        1 strip by In Vitro route 4 times daily (before meals and nightly) Use to test blood sugars 4 times daily or as directed   Refills:  11        calcium carbonate 500 MG chewable tablet   Commonly known as:  TUMS   Dose:  2 chew tab   Quantity:  150 tablet        Take 2 tablets (1,000 mg) by mouth 3 times daily as needed for heartburn   Refills:  3        CHANTIX STARTING MONTH OLIVERIO 0.5 MG X 11 & 1 MG X 42 tablet   Quantity:  1 tablet   Generic drug:  varenicline        Dispense starter pack as written   Refills:  0        empagliflozin 10 MG Tabs tablet   Commonly known as:  JARDIANCE   Quantity:  90 tablet        Take 1 tablet by mouth daily   Refills:  3        gabapentin 100 MG capsule   Commonly known as:  NEURONTIN   Dose:  200 mg   Quantity:  90 capsule        Take 2 capsules (200 mg) by mouth 3 times daily   Refills:  0        glycerin 2 g suppository   Commonly known as:  ADULT   Dose:  1 suppository   Quantity:  5  suppository        Place 1 suppository rectally daily as needed for constipation   Refills:  0        hyoscyamine 0.125 MG Tbdp   Dose:  0.25 mg   Quantity:  60 tablet        Take 2 tablets (0.25 mg) by mouth 4 times daily as needed for cramping   Refills:  0        ibuprofen 200 MG tablet   Commonly known as:  ADVIL/MOTRIN   Dose:  400 mg   Quantity:  60 tablet        Take 2 tablets (400 mg) by mouth every 8 hours as needed for pain   Refills:  0        * insulin glargine 100 UNIT/ML pen   Commonly known as:  LANTUS SOLOSTAR   Dose:  20 Units   Quantity:  15 mL        Inject 20 Units Subcutaneous every morning   Refills:  0        * insulin glargine 100 UNIT/ML pen   Dose:  20 Units   Quantity:  15 mL        Inject 20 Units Subcutaneous daily   Refills:  0        insulin pen needle 31G X 8 MM miscellaneous   Commonly known as:  B-D U/F   Quantity:  100 each        Use 1 pen needles daily or as directed.   Refills:  0        magnesium oxide 400 MG tablet   Commonly known as:  MAG-OX   Dose:  400 mg   Quantity:  30 tablet        Take 1 tablet (400 mg) by mouth daily   Refills:  0        methylPREDNISolone 4 MG tablet therapy pack   Commonly known as:  MEDROL DOSEPAK   Quantity:  21 tablet        Follow package instructions   Refills:  0        montelukast 10 MG tablet   Commonly known as:  SINGULAIR   Dose:  10 mg   Quantity:  30 tablet        Take 1 tablet (10 mg) by mouth At Bedtime   Refills:  1        omeprazole 20 MG DR capsule   Commonly known as:  priLOSEC   Dose:  20 mg   Quantity:  180 capsule        Take 1 capsule (20 mg) by mouth 2 times daily   Refills:  3        ondansetron 4 MG ODT tab   Commonly known as:  ZOFRAN-ODT   Dose:  4 mg   Quantity:  6 tablet        Take 1 tablet (4 mg) by mouth 2 times daily   Refills:  0        pseudoePHEDrine 30 MG tablet   Commonly known as:  SUDAFED   Dose:  60 mg   Quantity:  20 tablet        Take 2 tablets (60 mg) by mouth every 6 hours as needed for congestion    Refills:  0        ranitidine 150 MG tablet   Commonly known as:  ZANTAC   Dose:  150 mg   Quantity:  60 tablet        Take 1 tablet (150 mg) by mouth 2 times daily   Refills:  3        simethicone 80 MG chewable tablet   Commonly known as:  MYLICON   Dose:  80 mg   Quantity:  30 tablet        Take 1 tablet (80 mg) by mouth 4 times daily   Refills:  0        * Notice:  This list has 2 medication(s) that are the same as other medications prescribed for you. Read the directions carefully, and ask your doctor or other care provider to review them with you.            Prescriptions were sent or printed at these locations (1 Prescription)                   Other Prescriptions                Printed at Department/Unit printer (1 of 1)         trimethoprim-polymyxin b (POLYTRIM) 93677-6.1 UNIT/ML-% ophthalmic solution                Orders Needing Specimen Collection     None      Pending Results     No orders found from 11/28/2018 to 12/1/2018.            Pending Culture Results     No orders found from 11/28/2018 to 12/1/2018.            Pending Results Instructions     If you had any lab results that were not finalized at the time of your Discharge, you can call the ED Lab Result RN at [phone]. You will be contacted by this team for any positive Lab results or changes in treatment. The nurses are available 7 days a week from 10A to 6:30P.  You can leave a message 24 hours per day and they will return your call.        Thank you for choosing [facility]       Thank you for choosing [facility] for your care. Our goal is always to provide you with excellent care. Hearing back from our patients is one way we can continue to improve our services. Please take a few minutes to complete the written survey that you may receive in the mail after you visit with us. Thank you!        [employer] Information     [employer] gives you secure access to your electronic health record. If you see a primary care provider, you can also send  messages to your care team and make appointments. If you have questions, please call your primary care clinic.  If you do not have a primary care provider, please call [phone] and they will assist you.        Care EveryWhere ID     This is your Care EveryWhere ID. This could be used by other organizations to access your [facility] medical records  [id number]        Equal Access to Services     RA JORDAN : Apollo Cody, maverick centeno, luis enrique reyna, dez moreno . So [facility] [phone].    ATENCIÓN: Si habla español, tiene a kohli disposición servicios gratuitos de asistencia lingüística. Gurdeep al [phone].    We comply with applicable federal civil rights laws and [place] laws. We do not discriminate on the basis of race, color, national origin, age, disability, sex, sexual orientation, or gender identity.            After Visit Summary       This is your record. Keep this with you and show to your community pharmacist(s) and doctor(s) at your next visit.
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
## 2018-11-30 NOTE — ED AVS SNAPSHOT
[facility], [place], Emergency Department    [address] [place] [address]    Phone:  [phone]                                       Andrew Lockwood   MRN: [id number]    Department:  [facility], Emergency Department   Date of Visit:  11/30/2018           After Visit Summary Signature Page     I have received my discharge instructions, and my questions have been answered. I have discussed any challenges I see with this plan with the nurse or doctor.    ..........................................................................................................................................  Patient/Patient Representative Signature      ..........................................................................................................................................  Patient Representative Print Name and Relationship to Patient    ..................................................               ................................................  Date                                   Time    ..........................................................................................................................................  Reviewed by Signature/Title    ...................................................              ..............................................  Date                                               Time          22EPIC Rev 08/18

## 2018-11-30 NOTE — DISCHARGE INSTRUCTIONS
Use polytrim eyedrops in right eye as directed.    Please make an appointment to follow up with Eye Clinic (phone: [phone]) and [facility] (phone: [phone]).     Return to the emergency department if fever, difficulty with your vision, double vision, worse, or other concerns.
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
## 2018-12-19 ENCOUNTER — HOSPITAL ENCOUNTER (EMERGENCY)
Facility: CLINIC | Age: 53
Discharge: HOME | End: 2018-12-19
Attending: EMERGENCY MEDICINE | Admitting: EMERGENCY MEDICINE
Payer: COMMERCIAL

## 2018-12-19 VITALS
SYSTOLIC BLOOD PRESSURE: 145 MMHG | DIASTOLIC BLOOD PRESSURE: 92 MMHG | TEMPERATURE: 98.3 F | HEIGHT: 64 IN | BODY MASS INDEX: 29.02 KG/M2 | OXYGEN SATURATION: 100 % | WEIGHT: 170 LBS | RESPIRATION RATE: 14 BRPM

## 2018-12-19 DIAGNOSIS — R25.2 CRAMP OF LIMB: ICD-10-CM

## 2018-12-19 DIAGNOSIS — R19.7 DIARRHEA, UNSPECIFIED TYPE: ICD-10-CM

## 2018-12-19 DIAGNOSIS — R35.89 POLYURIA: ICD-10-CM

## 2018-12-19 LAB
ALBUMIN UR-MCNC: NEGATIVE MG/DL
ANION GAP SERPL CALCULATED.3IONS-SCNC: 6 MMOL/L (ref 3–14)
APPEARANCE UR: CLEAR
BASOPHILS # BLD AUTO: 0.1 10E9/L (ref 0–0.2)
BASOPHILS NFR BLD AUTO: 0.9 %
BILIRUB UR QL STRIP: NEGATIVE
BUN SERPL-MCNC: 15 MG/DL (ref 7–30)
CALCIUM SERPL-MCNC: 9.1 MG/DL (ref 8.5–10.1)
CHLORIDE SERPL-SCNC: 106 MMOL/L (ref 94–109)
CO2 SERPL-SCNC: 25 MMOL/L (ref 20–32)
COLOR UR AUTO: ABNORMAL
CREAT SERPL-MCNC: 0.82 MG/DL (ref 0.66–1.25)
DIFFERENTIAL METHOD BLD: NORMAL
DIFFERENTIAL METHOD BLD: NORMAL
EOSINOPHIL # BLD AUTO: 0.4 10E9/L (ref 0–0.7)
EOSINOPHIL NFR BLD AUTO: 4.5 %
ERYTHROCYTE [DISTWIDTH] IN BLOOD BY AUTOMATED COUNT: 13.4 % (ref 10–15)
ERYTHROCYTE [DISTWIDTH] IN BLOOD BY AUTOMATED COUNT: NORMAL % (ref 10–15)
GFR SERPL CREATININE-BSD FRML MDRD: >90 ML/MIN/{1.73_M2}
GLUCOSE BLDC GLUCOMTR-MCNC: 252 MG/DL (ref 70–99)
GLUCOSE SERPL-MCNC: 259 MG/DL (ref 70–99)
GLUCOSE UR STRIP-MCNC: >1000 MG/DL
HCT VFR BLD AUTO: 41.6 % (ref 40–53)
HCT VFR BLD AUTO: NORMAL % (ref 40–53)
HGB BLD-MCNC: 13.9 G/DL (ref 13.3–17.7)
HGB BLD-MCNC: NORMAL G/DL (ref 13.3–17.7)
HGB UR QL STRIP: NEGATIVE
KETONES UR STRIP-MCNC: NEGATIVE MG/DL
LEUKOCYTE ESTERASE UR QL STRIP: NEGATIVE
LYMPHOCYTES # BLD AUTO: 1.6 10E9/L (ref 0.8–5.3)
LYMPHOCYTES NFR BLD AUTO: 20.5 %
MCH RBC QN AUTO: 31.4 PG (ref 26.5–33)
MCH RBC QN AUTO: NORMAL PG (ref 26.5–33)
MCHC RBC AUTO-ENTMCNC: 33.4 G/DL (ref 31.5–36.5)
MCHC RBC AUTO-ENTMCNC: NORMAL G/DL (ref 31.5–36.5)
MCV RBC AUTO: 94 FL (ref 78–100)
MCV RBC AUTO: NORMAL FL (ref 78–100)
MONOCYTES # BLD AUTO: 0.6 10E9/L (ref 0–1.3)
MONOCYTES NFR BLD AUTO: 8 %
MUCOUS THREADS #/AREA URNS LPF: PRESENT /LPF
NEUTROPHILS # BLD AUTO: 5.2 10E9/L (ref 1.6–8.3)
NEUTROPHILS NFR BLD AUTO: 66.1 %
NITRATE UR QL: NEGATIVE
PH UR STRIP: 7 PH (ref 5–7)
PLATELET # BLD AUTO: 427 10E9/L (ref 150–450)
PLATELET # BLD AUTO: NORMAL 10E9/L (ref 150–450)
PLATELET # BLD EST: NORMAL 10*3/UL
POTASSIUM SERPL-SCNC: 4.3 MMOL/L (ref 3.4–5.3)
RBC # BLD AUTO: 4.43 10E12/L (ref 4.4–5.9)
RBC # BLD AUTO: NORMAL 10E12/L (ref 4.4–5.9)
RBC #/AREA URNS AUTO: <1 /HPF (ref 0–2)
RBC MORPH BLD: NORMAL
SODIUM SERPL-SCNC: 137 MMOL/L (ref 133–144)
SOURCE: ABNORMAL
SP GR UR STRIP: 1.03 (ref 1–1.03)
UROBILINOGEN UR STRIP-MCNC: NORMAL MG/DL (ref 0–2)
WBC # BLD AUTO: 7.9 10E9/L (ref 4–11)
WBC # BLD AUTO: NORMAL 10E9/L (ref 4–11)
WBC #/AREA URNS AUTO: 0 /HPF (ref 0–5)

## 2018-12-19 PROCEDURE — 00000146 ZZHCL STATISTIC GLUCOSE BY METER IP

## 2018-12-19 PROCEDURE — 99283 EMERGENCY DEPT VISIT LOW MDM: CPT | Performed by: EMERGENCY MEDICINE

## 2018-12-19 PROCEDURE — 99283 EMERGENCY DEPT VISIT LOW MDM: CPT | Mod: Z6 | Performed by: EMERGENCY MEDICINE

## 2018-12-19 PROCEDURE — 80048 BASIC METABOLIC PNL TOTAL CA: CPT | Performed by: EMERGENCY MEDICINE

## 2018-12-19 PROCEDURE — 85025 COMPLETE CBC W/AUTO DIFF WBC: CPT | Performed by: EMERGENCY MEDICINE

## 2018-12-19 PROCEDURE — 81001 URINALYSIS AUTO W/SCOPE: CPT | Performed by: EMERGENCY MEDICINE

## 2018-12-19 ASSESSMENT — ENCOUNTER SYMPTOMS
DIARRHEA: 1
BACK PAIN: 0
FEVER: 0
DYSURIA: 0
CONFUSION: 0
WEAKNESS: 0
SHORTNESS OF BREATH: 0
ABDOMINAL PAIN: 0
SORE THROAT: 0
POLYDIPSIA: 1
BRUISES/BLEEDS EASILY: 0

## 2018-12-19 ASSESSMENT — MIFFLIN-ST. JEOR: SCORE: 1527.11

## 2018-12-19 NOTE — ED PROVIDER NOTES
"  History     Chief Complaint   Patient presents with     Diarrhea     Polyuria     HPI  Andrew Lockwood is a 51 year old male with a history of DM 2, AIDS, CNS toxoplasmosis, herpes zoster, who presents to the emergency department for the evaluation of diarrhea and leg cramping.  Patient complains of a 3-day history of cramping in his legs.  Patient describes the pain as a cramping sensation in his bilateral lower extremities with no radiation, no aggravating, no relieving factors.  Patient also reports that he has had increased thirst along with increased urine output.  Patient states he has been drinking a lot of water and pop.  Patient reports that last night he developed diarrhea and reports multiple episodes approximately 6 of loose watery nonbloody stools.  Patient denies any fever, chills, nausea, vomiting, chest pain, shortness of breath, abdominal pain.  No sick contacts however patient is currently homeless and is staying at a shelter.  No recent travel, no changes in medications.  No other complaints.    I have reviewed the Medications, Allergies, Past Medical and Surgical History, and Social History in the Epic system.    Review of Systems   Constitutional: Negative for fever.   HENT: Negative for sore throat.    Eyes: Negative for visual disturbance.   Respiratory: Negative for shortness of breath.    Cardiovascular: Negative for chest pain.   Gastrointestinal: Positive for diarrhea. Negative for abdominal pain.   Endocrine: Positive for polydipsia and polyuria.   Genitourinary: Negative for dysuria.   Musculoskeletal: Negative for back pain.   Skin: Negative for rash.   Allergic/Immunologic: Positive for immunocompromised state.   Neurological: Negative for weakness.   Hematological: Does not bruise/bleed easily.   Psychiatric/Behavioral: Negative for confusion.       Physical Exam   BP: (!) 133/92  Heart Rate: 116  Temp: 98.3  F (36.8  C)  Resp: 16  Height: 162.6 cm (5' 4\")  Weight: 77.1 kg (170 " lb)  SpO2: 100 %      Physical Exam   Constitutional: He is oriented to person, place, and time. He appears well-developed. No distress.   HENT:   Head: Normocephalic and atraumatic.   Right Ear: External ear normal.   Left Ear: External ear normal.   Mouth/Throat: Oropharynx is clear and moist.   Eyes: Conjunctivae and EOM are normal. Pupils are equal, round, and reactive to light.   Neck: Normal range of motion. Neck supple.   Cardiovascular: Normal rate, regular rhythm and normal heart sounds.   Pulmonary/Chest: Effort normal and breath sounds normal. No respiratory distress. He has no wheezes. He has no rales.   Abdominal: Soft. He exhibits no distension. There is no tenderness. There is no rebound and no guarding.   Musculoskeletal: Normal range of motion. He exhibits no tenderness or deformity.   Neurological: He is alert and oriented to person, place, and time. No cranial nerve deficit. Coordination normal.   Skin: Skin is warm and dry. No rash noted.   Psychiatric: He has a normal mood and affect. His behavior is normal.       ED Course        Procedures              .  Results for orders placed or performed during the hospital encounter of 12/19/18   CBC with platelets differential   Result Value Ref Range    WBC Unsatisfactory specimen - clotted 4.0 - 11.0 10e9/L    RBC Count Unsatisfactory specimen - clotted 4.4 - 5.9 10e12/L    Hemoglobin Unsatisfactory specimen - clotted 13.3 - 17.7 g/dL    Hematocrit Unsatisfactory specimen - clotted 40.0 - 53.0 %    MCV Unsatisfactory specimen - clotted 78 - 100 fl    MCH Unsatisfactory specimen - clotted 26.5 - 33.0 pg    MCHC Unsatisfactory specimen - clotted 31.5 - 36.5 g/dL    RDW Unsatisfactory specimen - clotted 10.0 - 15.0 %    Platelet Count Unsatisfactory specimen - clotted 150 - 450 10e9/L    Diff Method Unsatisfactory specimen - clotted    Basic metabolic panel   Result Value Ref Range    Sodium 137 133 - 144 mmol/L    Potassium 4.3 3.4 - 5.3 mmol/L     Chloride 106 94 - 109 mmol/L    Carbon Dioxide 25 20 - 32 mmol/L    Anion Gap 6 3 - 14 mmol/L    Glucose 259 (H) 70 - 99 mg/dL    Urea Nitrogen 15 7 - 30 mg/dL    Creatinine 0.82 0.66 - 1.25 mg/dL    GFR Estimate >90 >60 mL/min/[1.73_m2]    GFR Estimate If Black >90 >60 mL/min/[1.73_m2]    Calcium 9.1 8.5 - 10.1 mg/dL   UA with Microscopic   Result Value Ref Range    Color Urine Light Yellow     Appearance Urine Clear     Glucose Urine >1000 (A) NEG^Negative mg/dL    Bilirubin Urine Negative NEG^Negative    Ketones Urine Negative NEG^Negative mg/dL    Specific Gravity Urine 1.030 1.003 - 1.035    Blood Urine Negative NEG^Negative    pH Urine 7.0 5.0 - 7.0 pH    Protein Albumin Urine Negative NEG^Negative mg/dL    Urobilinogen mg/dL Normal 0.0 - 2.0 mg/dL    Nitrite Urine Negative NEG^Negative    Leukocyte Esterase Urine Negative NEG^Negative    Source Midstream Urine     WBC Urine 0 0 - 5 /HPF    RBC Urine <1 0 - 2 /HPF    Mucous Urine Present (A) NEG^Negative /LPF   Glucose by meter   Result Value Ref Range    Glucose 252 (H) 70 - 99 mg/dL   CBC with platelets differential   Result Value Ref Range    WBC 7.9 4.0 - 11.0 10e9/L    RBC Count 4.43 4.4 - 5.9 10e12/L    Hemoglobin 13.9 13.3 - 17.7 g/dL    Hematocrit 41.6 40.0 - 53.0 %    MCV 94 78 - 100 fl    MCH 31.4 26.5 - 33.0 pg    MCHC 33.4 31.5 - 36.5 g/dL    RDW 13.4 10.0 - 15.0 %    Platelet Count 427 150 - 450 10e9/L    Diff Method PENDING            Assessments & Plan (with Medical Decision Making)   Andrew Lockwood is a 51 year old male with a history of DM 2, AIDS, CNS toxoplasmosis, herpes zoster, who presents to the emergency department for the evaluation of diarrhea and leg cramping.  Patient also complains of polyuria polydipsia.  Upon arrival patient is well-appearing, afebrile, no distress.  Patient is drinking water, abdomen is soft, nontender, nondistended, no peritoneal signs.  At this time unclear of etiology of patient's symptoms however will check  basic labs to rule out any acute metabolic, electrolyte, infectious etiology.  Patient does not want anything for the cramping at this time.  Patient orally hydrating with water. I reviewed comprehensive labs which are remarkable for blood glucose level of 259, no acute metabolic or electrolyte abnormality, no leukocytosis, white blood cell count 7.9, hemoglobin 13.9, urinalysis unremarkable with no signs of acute infection.  Patient with glucosuria however per prior urine patient always has glucosuria.  I discussed results with patient who is nontoxic-appearing.  Recommend continue oral hydration with water, discussed with patient to limit soda intake, continue his home medications, recommend the brat diet, and close follow-up in clinic this week for recheck.  Return precautions discussed if high fever, persistent diarrhea, weakness, bloody stools, or any worsening symptoms.  Patient understands and agrees with the plan. .The patient is discharged home with instructions to return if their symptoms persist or worsen.  Plan for close follow-up with their primary physician.  I discussed workup, results, treatment, and plan with the patient.  Patient understands and agrees with the plan.      I have reviewed the nursing notes.    I have reviewed the findings, diagnosis, plan and need for follow up with the patient.       Medication List      There are no discharge medications for this visit.         Final diagnoses:   Cramp of limb   Diarrhea, unspecified type   Polyuria       12/19/2018   [facility], [place], EMERGENCY DEPARTMENT     Sheila Bruno MD  12/19/18 [id number]

## 2018-12-19 NOTE — DISCHARGE INSTRUCTIONS
Thank you for your patience today.  Please follow-up with your regular doctor in the next 2-3 days for further evaluation and follow-up care.  Please call to schedule an appointment.  Please continue your own medications.  Please drink plenty of water.  Please follow the brat diet (bananas, rice, applesauce, toast, yogurt )  please return to the ER if you develop high fever, weakness, persistent vomiting, diarrhea, or any worsening of your current symptoms.  It was a pleasure taking care of you today.  We hope you feel better soon.

## 2018-12-19 NOTE — ED TRIAGE NOTES
Patient presents to ED with excessive thirst, diarrhea, polyuria, and cramps to BLE. Patient is A/O, VSS, afebrile.

## 2018-12-19 NOTE — ED AVS SNAPSHOT
[facility], [place], Emergency Department  [address] [place] [address]  Phone:  [phone]                                    Andrew Lockwood   MRN: [id number]    Department:  [facility], Emergency Department   Date of Visit:  12/19/2018           After Visit Summary Signature Page    I have received my discharge instructions, and my questions have been answered. I have discussed any challenges I see with this plan with the nurse or doctor.    ..........................................................................................................................................  Patient/Patient Representative Signature      ..........................................................................................................................................  Patient Representative Print Name and Relationship to Patient    ..................................................               ................................................  Date                                   Time    ..........................................................................................................................................  Reviewed by Signature/Title    ...................................................              ..............................................  Date                                               Time          22EPIC Rev 08/18

## 2018-12-25 ENCOUNTER — APPOINTMENT (OUTPATIENT)
Dept: CT IMAGING | Facility: CLINIC | Age: 53
End: 2018-12-25
Attending: INTERNAL MEDICINE
Payer: COMMERCIAL

## 2018-12-25 ENCOUNTER — APPOINTMENT (OUTPATIENT)
Dept: GENERAL RADIOLOGY | Facility: CLINIC | Age: 53
End: 2018-12-25
Attending: INTERNAL MEDICINE
Payer: COMMERCIAL

## 2018-12-25 ENCOUNTER — HOSPITAL ENCOUNTER (EMERGENCY)
Facility: CLINIC | Age: 53
Discharge: HOME | End: 2018-12-25
Attending: INTERNAL MEDICINE | Admitting: INTERNAL MEDICINE
Payer: COMMERCIAL

## 2018-12-25 VITALS
OXYGEN SATURATION: 98 % | HEART RATE: 89 BPM | SYSTOLIC BLOOD PRESSURE: 120 MMHG | HEIGHT: 64 IN | WEIGHT: 155 LBS | DIASTOLIC BLOOD PRESSURE: 88 MMHG | TEMPERATURE: 97.9 F | BODY MASS INDEX: 26.46 KG/M2 | RESPIRATION RATE: 17 BRPM

## 2018-12-25 DIAGNOSIS — S20.212A CHEST WALL CONTUSION, LEFT, INITIAL ENCOUNTER: ICD-10-CM

## 2018-12-25 DIAGNOSIS — S09.90XA MINOR CLOSED HEAD INJURY: ICD-10-CM

## 2018-12-25 DIAGNOSIS — S50.02XA CONTUSION OF LEFT ELBOW, INITIAL ENCOUNTER: ICD-10-CM
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
## 2018-12-25 PROCEDURE — 25000132 ZZH RX MED GY IP 250 OP 250 PS 637: Performed by: INTERNAL MEDICINE

## 2018-12-25 PROCEDURE — 99284 EMERGENCY DEPT VISIT MOD MDM: CPT | Mod: Z6 | Performed by: INTERNAL MEDICINE

## 2018-12-25 PROCEDURE — 70450 CT HEAD/BRAIN W/O DYE: CPT

## 2018-12-25 PROCEDURE — 73070 X-RAY EXAM OF ELBOW: CPT | Mod: LT

## 2018-12-25 PROCEDURE — 71046 X-RAY EXAM CHEST 2 VIEWS: CPT

## 2018-12-25 PROCEDURE — 99285 EMERGENCY DEPT VISIT HI MDM: CPT | Mod: 25 | Performed by: INTERNAL MEDICINE

## 2018-12-25 RX ORDER — OXYCODONE HYDROCHLORIDE 5 MG/1
5 TABLET ORAL ONCE
Status: COMPLETED | OUTPATIENT
Start: 2018-12-25 | End: 2018-12-25

## 2018-12-25 RX ORDER — NAPROXEN 500 MG/1
500 TABLET ORAL 2 TIMES DAILY WITH MEALS
Qty: 16 TABLET | Refills: 0 | Status: SHIPPED | OUTPATIENT
Start: 2018-12-25 | End: 2019-03-08

## 2018-12-25 RX ORDER — TRAMADOL HYDROCHLORIDE 50 MG/1
50 TABLET ORAL EVERY 6 HOURS PRN
Qty: 10 TABLET | Refills: 0 | Status: SHIPPED | OUTPATIENT
Start: 2018-12-25 | End: 2019-03-08

## 2018-12-25 RX ADMIN — OXYCODONE HYDROCHLORIDE 5 MG: 5 TABLET ORAL at 18:49

## 2018-12-25 ASSESSMENT — ENCOUNTER SYMPTOMS
NUMBNESS: 0
FACIAL ASYMMETRY: 0
CONFUSION: 0
BACK PAIN: 0
ARTHRALGIAS: 1
ABDOMINAL PAIN: 0
WEAKNESS: 0
SPEECH DIFFICULTY: 0
NECK PAIN: 0
COUGH: 0
DIFFICULTY URINATING: 0
PALPITATIONS: 0
ADENOPATHY: 0
HEADACHES: 1
FEVER: 0
SHORTNESS OF BREATH: 0

## 2018-12-25 ASSESSMENT — MIFFLIN-ST. JEOR: SCORE: 1459.08

## 2018-12-25 NOTE — ED AVS SNAPSHOT
[facility], [place], Emergency Department  [address] [place] [address]  Phone:  [phone]                                    Andrew Lockwood   MRN: [id number]    Department:  [facility], Emergency Department   Date of Visit:  12/25/2018           After Visit Summary Signature Page    I have received my discharge instructions, and my questions have been answered. I have discussed any challenges I see with this plan with the nurse or doctor.    ..........................................................................................................................................  Patient/Patient Representative Signature      ..........................................................................................................................................  Patient Representative Print Name and Relationship to Patient    ..................................................               ................................................  Date                                   Time    ..........................................................................................................................................  Reviewed by Signature/Title    ...................................................              ..............................................  Date                                               Time          22EPIC Rev 08/18
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
## 2018-12-26 NOTE — DISCHARGE INSTRUCTIONS
Naproxen 500 mg twice/ day as needed for pain.  Tramadol 1 every 6 hours as needed for pain.   Cold packs to the painful areas.  Return as needed for new or worsening symptoms.

## 2018-12-28 ENCOUNTER — APPOINTMENT (OUTPATIENT)
Dept: GENERAL RADIOLOGY | Facility: CLINIC | Age: 53
End: 2018-12-28
Attending: EMERGENCY MEDICINE
Payer: COMMERCIAL

## 2018-12-28 ENCOUNTER — HOSPITAL ENCOUNTER (EMERGENCY)
Facility: CLINIC | Age: 53
Discharge: HOME | End: 2018-12-28
Attending: EMERGENCY MEDICINE | Admitting: EMERGENCY MEDICINE
Payer: COMMERCIAL

## 2018-12-28 VITALS
OXYGEN SATURATION: 99 % | HEART RATE: 98 BPM | RESPIRATION RATE: 17 BRPM | SYSTOLIC BLOOD PRESSURE: 132 MMHG | TEMPERATURE: 97.6 F | DIASTOLIC BLOOD PRESSURE: 72 MMHG

## 2018-12-28 DIAGNOSIS — R07.81 RIB PAIN: ICD-10-CM

## 2018-12-28 PROCEDURE — 25000132 ZZH RX MED GY IP 250 OP 250 PS 637: Performed by: EMERGENCY MEDICINE

## 2018-12-28 PROCEDURE — 71101 X-RAY EXAM UNILAT RIBS/CHEST: CPT | Mod: LT

## 2018-12-28 PROCEDURE — 99284 EMERGENCY DEPT VISIT MOD MDM: CPT | Performed by: EMERGENCY MEDICINE

## 2018-12-28 PROCEDURE — 99284 EMERGENCY DEPT VISIT MOD MDM: CPT | Mod: Z6 | Performed by: EMERGENCY MEDICINE

## 2018-12-28 RX ORDER — TRAMADOL HYDROCHLORIDE 50 MG/1
50 TABLET ORAL EVERY 6 HOURS PRN
Qty: 10 TABLET | Refills: 0 | Status: SHIPPED | OUTPATIENT
Start: 2018-12-28 | End: 2019-03-08

## 2018-12-28 RX ORDER — IBUPROFEN 600 MG/1
600 TABLET, FILM COATED ORAL ONCE
Status: COMPLETED | OUTPATIENT
Start: 2018-12-28 | End: 2018-12-28

## 2018-12-28 RX ORDER — CODEINE PHOSPHATE AND GUAIFENESIN 10; 100 MG/5ML; MG/5ML
1-2 SOLUTION ORAL EVERY 4 HOURS PRN
Qty: 10 ML | Refills: 0 | Status: SHIPPED | OUTPATIENT
Start: 2018-12-28 | End: 2019-01-01

## 2018-12-28 RX ORDER — CYCLOBENZAPRINE HCL 5 MG
10 TABLET ORAL ONCE
Status: COMPLETED | OUTPATIENT
Start: 2018-12-28 | End: 2018-12-28

## 2018-12-28 RX ADMIN — IBUPROFEN 600 MG: 600 TABLET ORAL at 22:04

## 2018-12-28 RX ADMIN — CYCLOBENZAPRINE HYDROCHLORIDE 10 MG: 5 TABLET, FILM COATED ORAL at 22:04

## 2018-12-28 ASSESSMENT — ENCOUNTER SYMPTOMS: COUGH: 1

## 2018-12-28 NOTE — ED AVS SNAPSHOT
[facility], [place], Emergency Department  [address] [place] [address]  Phone:  [phone]                                    Andrew Lockwood   MRN: [id number]    Department:  [facility], Emergency Department   Date of Visit:  12/28/2018           After Visit Summary Signature Page    I have received my discharge instructions, and my questions have been answered. I have discussed any challenges I see with this plan with the nurse or doctor.    ..........................................................................................................................................  Patient/Patient Representative Signature      ..........................................................................................................................................  Patient Representative Print Name and Relationship to Patient    ..................................................               ................................................  Date                                   Time    ..........................................................................................................................................  Reviewed by Signature/Title    ...................................................              ..............................................  Date                                               Time          22EPIC Rev 08/18

## 2018-12-29 NOTE — ED TRIAGE NOTES
Pt presents to ED for a complaint of rib pain. PT was seen 3 days ago for the same thing but today he states the pain is worse.
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
## 2019-01-01 ENCOUNTER — HOSPITAL ENCOUNTER (EMERGENCY)
Facility: CLINIC | Age: 54
Discharge: HOME | End: 2019-01-01
Attending: EMERGENCY MEDICINE | Admitting: EMERGENCY MEDICINE
Payer: COMMERCIAL

## 2019-01-01 VITALS
SYSTOLIC BLOOD PRESSURE: 104 MMHG | BODY MASS INDEX: 26.61 KG/M2 | HEART RATE: 101 BPM | OXYGEN SATURATION: 97 % | TEMPERATURE: 99.9 F | DIASTOLIC BLOOD PRESSURE: 71 MMHG | WEIGHT: 155 LBS

## 2019-01-01 DIAGNOSIS — E86.0 DEHYDRATION: ICD-10-CM

## 2019-01-01 DIAGNOSIS — J20.9 ACUTE BRONCHITIS, UNSPECIFIED ORGANISM: ICD-10-CM

## 2019-01-01 LAB
ANION GAP SERPL CALCULATED.3IONS-SCNC: 10 MMOL/L (ref 3–14)
BASOPHILS # BLD AUTO: 0 10E9/L (ref 0–0.2)
BASOPHILS NFR BLD AUTO: 0.2 %
BUN SERPL-MCNC: 12 MG/DL (ref 7–30)
CALCIUM SERPL-MCNC: 8.2 MG/DL (ref 8.5–10.1)
CHLORIDE SERPL-SCNC: 99 MMOL/L (ref 94–109)
CO2 SERPL-SCNC: 24 MMOL/L (ref 20–32)
CREAT SERPL-MCNC: 0.65 MG/DL (ref 0.66–1.25)
DIFFERENTIAL METHOD BLD: ABNORMAL
EOSINOPHIL # BLD AUTO: 0.2 10E9/L (ref 0–0.7)
EOSINOPHIL NFR BLD AUTO: 2 %
ERYTHROCYTE [DISTWIDTH] IN BLOOD BY AUTOMATED COUNT: 13.4 % (ref 10–15)
FLUAV+FLUBV AG SPEC QL: NEGATIVE
FLUAV+FLUBV AG SPEC QL: NEGATIVE
GFR SERPL CREATININE-BSD FRML MDRD: >90 ML/MIN/{1.73_M2}
GLUCOSE SERPL-MCNC: 234 MG/DL (ref 70–99)
HCT VFR BLD AUTO: 40.3 % (ref 40–53)
HGB BLD-MCNC: 13.4 G/DL (ref 13.3–17.7)
IMM GRANULOCYTES # BLD: 0 10E9/L (ref 0–0.4)
IMM GRANULOCYTES NFR BLD: 0.4 %
LYMPHOCYTES # BLD AUTO: 1.8 10E9/L (ref 0.8–5.3)
LYMPHOCYTES NFR BLD AUTO: 16.2 %
MCH RBC QN AUTO: 31.2 PG (ref 26.5–33)
MCHC RBC AUTO-ENTMCNC: 33.3 G/DL (ref 31.5–36.5)
MCV RBC AUTO: 94 FL (ref 78–100)
MONOCYTES # BLD AUTO: 0.9 10E9/L (ref 0–1.3)
MONOCYTES NFR BLD AUTO: 8.1 %
NEUTROPHILS # BLD AUTO: 8.1 10E9/L (ref 1.6–8.3)
NEUTROPHILS NFR BLD AUTO: 73.1 %
NRBC # BLD AUTO: 0 10*3/UL
NRBC BLD AUTO-RTO: 0 /100
PLATELET # BLD AUTO: 326 10E9/L (ref 150–450)
POTASSIUM SERPL-SCNC: 3.2 MMOL/L (ref 3.4–5.3)
RBC # BLD AUTO: 4.3 10E12/L (ref 4.4–5.9)
SODIUM SERPL-SCNC: 132 MMOL/L (ref 133–144)
SPECIMEN SOURCE: NORMAL
WBC # BLD AUTO: 11.1 10E9/L (ref 4–11)

## 2019-01-01 PROCEDURE — 85025 COMPLETE CBC W/AUTO DIFF WBC: CPT | Performed by: EMERGENCY MEDICINE

## 2019-01-01 PROCEDURE — 87804 INFLUENZA ASSAY W/OPTIC: CPT | Mod: 91 | Performed by: EMERGENCY MEDICINE

## 2019-01-01 PROCEDURE — 87536 HIV-1 QUANT&REVRSE TRNSCRPJ: CPT | Performed by: EMERGENCY MEDICINE

## 2019-01-01 PROCEDURE — 80048 BASIC METABOLIC PNL TOTAL CA: CPT | Performed by: EMERGENCY MEDICINE

## 2019-01-01 PROCEDURE — 99284 EMERGENCY DEPT VISIT MOD MDM: CPT | Mod: 25 | Performed by: EMERGENCY MEDICINE

## 2019-01-01 PROCEDURE — 99284 EMERGENCY DEPT VISIT MOD MDM: CPT | Mod: Z6 | Performed by: EMERGENCY MEDICINE

## 2019-01-01 PROCEDURE — 25000132 ZZH RX MED GY IP 250 OP 250 PS 637: Performed by: EMERGENCY MEDICINE

## 2019-01-01 PROCEDURE — 96360 HYDRATION IV INFUSION INIT: CPT | Performed by: EMERGENCY MEDICINE

## 2019-01-01 PROCEDURE — 25000128 H RX IP 250 OP 636: Performed by: EMERGENCY MEDICINE

## 2019-01-01 PROCEDURE — 96361 HYDRATE IV INFUSION ADD-ON: CPT | Performed by: EMERGENCY MEDICINE

## 2019-01-01 RX ORDER — AZITHROMYCIN 250 MG/1
TABLET, FILM COATED ORAL
Qty: 6 TABLET | Refills: 0 | Status: ON HOLD | OUTPATIENT
Start: 2019-01-01 | End: 2019-01-07

## 2019-01-01 RX ORDER — POTASSIUM CHLORIDE 750 MG/1
40 TABLET, EXTENDED RELEASE ORAL ONCE
Status: COMPLETED | OUTPATIENT
Start: 2019-01-01 | End: 2019-01-01

## 2019-01-01 RX ORDER — CODEINE PHOSPHATE AND GUAIFENESIN 10; 100 MG/5ML; MG/5ML
1-2 SOLUTION ORAL EVERY 6 HOURS PRN
Qty: 118 ML | Refills: 0 | Status: ON HOLD | OUTPATIENT
Start: 2019-01-01 | End: 2019-01-07

## 2019-01-01 RX ORDER — CODEINE PHOSPHATE AND GUAIFENESIN 10; 100 MG/5ML; MG/5ML
10 SOLUTION ORAL ONCE
Status: COMPLETED | OUTPATIENT
Start: 2019-01-01 | End: 2019-01-01

## 2019-01-01 RX ADMIN — SODIUM CHLORIDE 1000 ML: 9 INJECTION, SOLUTION INTRAVENOUS at 18:33

## 2019-01-01 RX ADMIN — GUAIFENESIN AND CODEINE PHOSPHATE 10 ML: 10; 100 LIQUID ORAL at 20:17

## 2019-01-01 RX ADMIN — SODIUM CHLORIDE 1000 ML: 9 INJECTION, SOLUTION INTRAVENOUS at 19:39

## 2019-01-01 RX ADMIN — POTASSIUM CHLORIDE 40 MEQ: 750 TABLET, EXTENDED RELEASE ORAL at 19:39

## 2019-01-01 NOTE — ED AVS SNAPSHOT
[facility], [place], Emergency Department  [address] [place] [id number]  Phone:  [phone]                                    Andrew Lockwood   MRN: [id number]    Department:  [facility], Emergency Department   Date of Visit:  1/1/2019           After Visit Summary Signature Page    I have received my discharge instructions, and my questions have been answered. I have discussed any challenges I see with this plan with the nurse or doctor.    ..........................................................................................................................................  Patient/Patient Representative Signature      ..........................................................................................................................................  Patient Representative Print Name and Relationship to Patient    ..................................................               ................................................  Date                                   Time    ..........................................................................................................................................  Reviewed by Signature/Title    ...................................................              ..............................................  Date                                               Time          22EPIC Rev 08/18

## 2019-01-01 NOTE — ED NOTES
Bed: ED04  Expected date: 1/1/19  Expected time:   Means of arrival: Ambulance  [place]  [facility] [place] [facility]  53 y M  Flu Symptoms  Green

## 2019-01-02 NOTE — DISCHARGE INSTRUCTIONS
Please make an appointment to follow up with Your Primary Care Provider in 5-7 days if not improving.    Return to the ER for worsening or fever.    Keep hydrated.

## 2019-01-02 NOTE — ED PROVIDER NOTES
[place] EMERGENCY DEPARTMENT ([facility])  January 1, 2019    History     Chief Complaint   Patient presents with     Flu Symptoms     HPI  Andrew Lockwood is a 51 year old male who presents to the ER with a week long of respiratory illness. Patient states he has been coughing up some green colored sputum and has gradually been worsening over the week. Patient states he was seen in the ER and given some Robitussin. However, he states he is not getting any better.  Patient denies any true shortness of breath, but he states he feels like he is dehydrated and has some fevers and chills.  Patient's influenza status is uncertain.  Patient does have a previous history of HIV.    This part of the medical record was transcribed by Sharmaine Hughes Scribrobert, from a dictation done by Pieter Bruno MD.     PAST MEDICAL HISTORY  Past Medical History:   Diagnosis Date     AIDS (H)      Allergic rhinitis due to other allergen     DNS     Chronic abdominal pain      CNS toxoplasmosis (H)      Diabetes type 2, controlled (H)      GERD (gastroesophageal reflux disease)      HIV (human immunodeficiency virus infection) (H)      Periungual wart      Sleep apnea     doesn't use CPAP     PAST SURGICAL HISTORY  Past Surgical History:   Procedure Laterality Date     C NONSPECIFIC PROCEDURE      right forearm fracture     COLONOSCOPY Left 1/22/2016    Procedure: COMBINED COLONOSCOPY, SINGLE OR MULTIPLE BIOPSY/POLYPECTOMY BY BIOPSY;  Surgeon: Clark Saini MD;  Location: [facility]     HC EXPLORE UNDESC TESTIS,INGUIN/SCROTAL       LAPAROSCOPIC APPENDECTOMY N/A 1/31/2018    Procedure: LAPAROSCOPIC APPENDECTOMY;  LAPAROSCOPIC APPENDECTOMY;  Surgeon: Dawn Holt MD;  [facility]     LAPAROSCOPY DIAGNOSTIC (GENERAL) N/A 7/26/2016    Procedure: LAPAROSCOPY DIAGNOSTIC (GENERAL);  Surgeon: Susannah Arriaga MD;  [facility]     LAPAROSCOPY DIAGNOSTIC (GENERAL) N/A 4/16/2018    Procedure: LAPAROSCOPY  DIAGNOSTIC (GENERAL);  Diagnostic laparoscopy and lysis of adhesions;  Surgeon: Prince Dowling MD;  Location: [facility]     OPTICAL TRACKING SYSTEM CRANIOTOMY, EXCISE TUMOR, COMBINED Left 4/10/2015    Procedure: COMBINED OPTICAL TRACKING SYSTEM CRANIOTOMY, EXCISE TUMOR;  Surgeon: Mirlande Colmenares MD;  Location: [facility]     REPAIR GAMEKEEPER'S THUMB Right 2016    Procedure: REPAIR LIGAMENT ULNAR COLLATERAL THUMB (GAMEKEEPER'S);  Surgeon: Evin Zamorano MD;  [facility]     FAMILY HISTORY  Family History   Problem Relation Age of Onset     Diabetes Brother      Diabetes Father      Alzheimer Disease Father      Unknown/Adopted Mother      Diabetes Paternal Grandfather      Cancer No family hx of         no skin cancer     Skin Cancer No family hx of         no famiy hx of skin cancer     Glaucoma No family hx of      Macular Degeneration No family hx of      SOCIAL HISTORY  Social History     Tobacco Use     Smoking status: Current Every Day Smoker     Packs/day: 0.25     Types: Cigarettes     Last attempt to quit: 10/28/2016     Years since quittin.1     Smokeless tobacco: Former User   Substance Use Topics     Alcohol use: No     Alcohol/week: 0.0 oz     Comment: Last etoh in      MEDICATIONS  This SmartLink is deprecated. Use bigclix.com instead to display the medication list for a patient.  ALLERGIES  Allergies   Allergen Reactions     Metformin      Abdominal pain     Milk [Lac Bovis] Hives     Tylenol [Acetaminophen] Itching     Dulaglutide Rash       I have reviewed the Medications, Allergies, Past Medical and Surgical History, and Social History in the [employer] system.    Review of Systems   All other systems reviewed and are negative.      Physical Exam   BP: 124/90  Pulse: 105  Temp: 99.9  F (37.7  C)  Weight: 70.3 kg (155 lb)  SpO2: 98 %      Physical Exam   Constitutional: He is oriented to person, place, and time.   Alert conversant coughing and appears miserable   HENT:   Head:  Atraumatic.   Mouth/Throat: Oropharynx is clear and moist. No oropharyngeal exudate.   Eyes: EOM are normal. Pupils are equal, round, and reactive to light.   Neck: Neck supple.   Cardiovascular: Regular rhythm.   Pulmonary/Chest: Breath sounds normal. He has no wheezes. He has no rales.   Abdominal: Soft. There is no tenderness.   Musculoskeletal: He exhibits no edema or deformity.   Lymphadenopathy:     He has no cervical adenopathy.   Neurological: He is alert and oriented to person, place, and time. No cranial nerve deficit.   Grossly intact and symmetric   Skin: Skin is warm. No rash noted.   Psychiatric: He has a normal mood and affect.       ED Course        Procedures        Results for orders placed or performed during the hospital encounter of 01/01/19   CBC with platelets differential   Result Value Ref Range    WBC 11.1 (H) 4.0 - 11.0 10e9/L    RBC Count 4.30 (L) 4.4 - 5.9 10e12/L    Hemoglobin 13.4 13.3 - 17.7 g/dL    Hematocrit 40.3 40.0 - 53.0 %    MCV 94 78 - 100 fl    MCH 31.2 26.5 - 33.0 pg    MCHC 33.3 31.5 - 36.5 g/dL    RDW 13.4 10.0 - 15.0 %    Platelet Count 326 150 - 450 10e9/L    Diff Method Automated Method     % Neutrophils 73.1 %    % Lymphocytes 16.2 %    % Monocytes 8.1 %    % Eosinophils 2.0 %    % Basophils 0.2 %    % Immature Granulocytes 0.4 %    Nucleated RBCs 0 0 /100    Absolute Neutrophil 8.1 1.6 - 8.3 10e9/L    Absolute Lymphocytes 1.8 0.8 - 5.3 10e9/L    Absolute Monocytes 0.9 0.0 - 1.3 10e9/L    Absolute Eosinophils 0.2 0.0 - 0.7 10e9/L    Absolute Basophils 0.0 0.0 - 0.2 10e9/L    Abs Immature Granulocytes 0.0 0 - 0.4 10e9/L    Absolute Nucleated RBC 0.0    Basic metabolic panel   Result Value Ref Range    Sodium 132 (L) 133 - 144 mmol/L    Potassium 3.2 (L) 3.4 - 5.3 mmol/L    Chloride 99 94 - 109 mmol/L    Carbon Dioxide 24 20 - 32 mmol/L    Anion Gap 10 3 - 14 mmol/L    Glucose 234 (H) 70 - 99 mg/dL    Urea Nitrogen 12 7 - 30 mg/dL    Creatinine 0.65 (L) 0.66 - 1.25 mg/dL     GFR Estimate >90 >60 mL/min/[1.73_m2]    GFR Estimate If Black >90 >60 mL/min/[1.73_m2]    Calcium 8.2 (L) 8.5 - 10.1 mg/dL   Influenza A/B antigen   Result Value Ref Range    Influenza A/B Agn Specimen Nasopharyngeal     Influenza A Negative NEG^Negative    Influenza B Negative NEG^Negative     Exam: XR CHEST 2 VW, 12/25/2018 5:39 PM     Indication: trauma, left rib pain     Comparison: 10/30/2018     Findings:   PA and lateral upright view of the chest. The cardiomediastinal  silhouette and upper abdomen are unremarkable.     There is no pleural effusion. No pneumothorax. No focal airspace  opacities. No definite acute displaced rib fractures.                                                                      IMPRESSION: No acute cardiopulmonary findings. No definite acute  displaced rib fractures.     I have personally reviewed the examination and initial interpretation  and I agree with the findings.     JOSUE BHATT MD    Labs Ordered and Resulted from Time of ED Arrival Up to the Time of Departure from the ED   CBC WITH PLATELETS DIFFERENTIAL - Abnormal; Notable for the following components:       Result Value    WBC 11.1 (*)     RBC Count 4.30 (*)     All other components within normal limits   BASIC METABOLIC PANEL - Abnormal; Notable for the following components:    Sodium 132 (*)     Potassium 3.2 (*)     Glucose 234 (*)     Creatinine 0.65 (*)     Calcium 8.2 (*)     All other components within normal limits   HIV-1 RNA QUANTITATIVE   PERIPHERAL IV CATHETER   INFLUENZA A/B ANTIGEN         Assessments & Plan (with Medical Decision Making)     I have reviewed the nursing notes.    Medications   sodium chloride (PF) 0.9% PF flush 3 mL (not administered)   sodium chloride (PF) 0.9% PF flush 3 mL (not administered)   0.9% sodium chloride BOLUS (1,000 mLs Intravenous New Bag 1/1/19 1939)   guaiFENesin-codeine (ROBITUSSIN AC) 100-10 MG/5ML solution 10 mL (not administered)   0.9% sodium chloride BOLUS  (1,000 mLs Intravenous New Bag 1/1/19 1833)   potassium chloride ER (K-DUR/KLOR-CON M) CR tablet 40 mEq (40 mEq Oral Given 1/1/19 1939)       Patient was rehydrated with 2 L of normal saline here.  He was given a dose of Robitussin-AC along with some potassium.  The patient has a normal O2 sat and at this time will be treated for acute bronchitis.    I have reviewed the findings, diagnosis, plan and need for follow up with the patient.       Medication List      Started    azithromycin 250 MG tablet  Commonly known as:  ZITHROMAX Z-OLIVERIO  Two tablets on the first day, then one tablet daily for the next 4 days        Modified    guaiFENesin-codeine 100-10 MG/5ML solution  Commonly known as:  ROBITUSSIN AC  1-2 tsp., Oral, EVERY 6 HOURS PRN  What changed:  when to take this  #118ml given             Final diagnoses:   Acute bronchitis, unspecified organism   Dehydration     Please make an appointment to follow up with Your Primary Care Provider in 5-7 days if not improving.    Return to the ER for worsening or fever.    Keep hydrated.    Routine discharge instructions were given for this diagnosis.    Pieter Bruno MD    1/1/2019   [facility], [place], EMERGENCY DEPARTMENT     Pieter Bruno MD  01/01/19 1956

## 2019-01-02 NOTE — ED TRIAGE NOTES
BIBA d/t flu symptoms x 1 week, hx of HIV, EMS gave 4 mg Zofran IV for nausea, VSS ex temp 99.9 F oral

## 2019-01-03 ENCOUNTER — OFFICE VISIT (OUTPATIENT)
Dept: SURGERY | Facility: CLINIC | Age: 54
End: 2019-01-03
Payer: COMMERCIAL

## 2019-01-03 VITALS
SYSTOLIC BLOOD PRESSURE: 129 MMHG | BODY MASS INDEX: 29.08 KG/M2 | HEIGHT: 64 IN | HEART RATE: 118 BPM | WEIGHT: 170.3 LBS | DIASTOLIC BLOOD PRESSURE: 78 MMHG

## 2019-01-03 DIAGNOSIS — K62.82 ANAL DYSPLASIA: Primary | ICD-10-CM

## 2019-01-03 DIAGNOSIS — F41.9 ANXIETY: ICD-10-CM

## 2019-01-03 RX ORDER — LORAZEPAM 1 MG/1
1 TABLET ORAL EVERY 6 HOURS PRN
Qty: 1 TABLET | Refills: 0 | Status: SHIPPED | OUTPATIENT
Start: 2019-01-03 | End: 2019-04-15

## 2019-01-03 ASSESSMENT — MIFFLIN-ST. JEOR: SCORE: 1528.48

## 2019-01-03 NOTE — LETTER
1/3/2019       RE: Andrew Lockwood  [address]  [facility] [address]     Dear Colleague,    Thank you for referring your patient, Andrew Lockwood, to the [facility] COLON AND RECTAL SURGERY at [facility]. Please see a copy of my visit note below.      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     HPI: Andrew denies any anorectal complaints. He reports that his HIV is under good control.    ASSESSMENT: Written, informed consent was obtained prior to procedure.  Prior to the start of the procedure and with procedural staff participation, I verbally confirmed the patient s identity using two indicators, relevant allergies, that the procedure was appropriate and matched the consent or emergent situation, and that the correct equipment/implants were available. Immediately prior to starting the procedure I conducted the Time Out with the procedural staff and re-confirmed the patient s name, procedure, and site/side. (The Joint Commission universal protocol was followed.)  Yes    Sedation (Moderate or Deep): None    Anal cytology was not obtained with Dacron swab. Digital anal rectal exam was performed without any palpable lesions. Dilute acetic acid soak was completed for 2 minutes. Lubricant was used to insert the anoscope but he did not tolerate this so procedure was terminated.    PLAN: We discussed having high resolution anoscopy done in the OR versus in clinic after taking ativan. He would like to try again in clinic after taking ativan. Will reschedule him for this. Asked him to come one hour prior to his visit to sign his consent before taking ativan. Patient's questions were answered to his stated satisfaction and he is in agreement with  this plan.    For details of past medical history, surgical history, family history, medications, allergies, and review of systems, please see details below.    Medical history:  Past Medical History:   Diagnosis Date     AIDS (H)      Allergic rhinitis due to other allergen     DNS     Chronic abdominal pain      CNS toxoplasmosis (H)      Diabetes type 2, controlled (H)      GERD (gastroesophageal reflux disease)      HIV (human immunodeficiency virus infection) (H)      Periungual wart      Sleep apnea     doesn't use CPAP       Surgical history:  Past Surgical History:   Procedure Laterality Date     C NONSPECIFIC PROCEDURE      right forearm fracture     COLONOSCOPY Left 1/22/2016    Procedure: COMBINED COLONOSCOPY, SINGLE OR MULTIPLE BIOPSY/POLYPECTOMY BY BIOPSY;  Surgeon: Clark Saini MD;  Location: [facility]     HC EXPLORE UNDESC TESTIS,INGUIN/SCROTAL       LAPAROSCOPIC APPENDECTOMY N/A 1/31/2018    Procedure: LAPAROSCOPIC APPENDECTOMY;  LAPAROSCOPIC APPENDECTOMY;  Surgeon: Dawn Holt MD;  Location: [facility]     LAPAROSCOPY DIAGNOSTIC (GENERAL) N/A 7/26/2016    Procedure: LAPAROSCOPY DIAGNOSTIC (GENERAL);  Surgeon: Susannah Arriaga MD;  Location: [facility]     LAPAROSCOPY DIAGNOSTIC (GENERAL) N/A 4/16/2018    Procedure: LAPAROSCOPY DIAGNOSTIC (GENERAL);  Diagnostic laparoscopy and lysis of adhesions;  Surgeon: Prince Dowling MD;  Location: [facility]     OPTICAL TRACKING SYSTEM CRANIOTOMY, EXCISE TUMOR, COMBINED Left 4/10/2015    Procedure: COMBINED OPTICAL TRACKING SYSTEM CRANIOTOMY, EXCISE TUMOR;  Surgeon: Mirlande Colmenares MD;  Location: [facility]     REPAIR GAMEKEEPER'S THUMB Right 12/2/2016    Procedure: REPAIR LIGAMENT ULNAR COLLATERAL THUMB (GAMEKEEPER'S);  Surgeon: Evin Zamorano MD;  [facility]       Family history:  Family History   Problem Relation Age of Onset     Diabetes Brother      Diabetes Father      Alzheimer Disease Father      Unknown/Adopted Mother       Diabetes Paternal Grandfather      Cancer No family hx of         no skin cancer     Skin Cancer No family hx of         no famiy hx of skin cancer     Glaucoma No family hx of      Macular Degeneration No family hx of        Medications:  Current Outpatient Medications   Medication Sig Dispense Refill     LORazepam (ATIVAN) 1 MG tablet Take 1 tablet (1 mg) by mouth every 6 hours as needed for anxiety 1 tablet 0     albuterol (PROAIR HFA/PROVENTIL HFA/VENTOLIN HFA) 108 (90 Base) MCG/ACT inhaler Inhale 2 puffs into the lungs every 6 hours as needed for shortness of breath / dyspnea or wheezing (Patient not taking: Reported on 11/13/2018) 1 Inhaler 1     azithromycin (ZITHROMAX Z-OLIVERIO) 250 MG tablet Two tablets on the first day, then one tablet daily for the next 4 days 6 tablet 0     BASAGLAR 100 UNIT/ML injection Inject 20 Units Subcutaneous daily 15 mL 0     Bictegravir-Emtricitab-Tenofov -25 MG TABS Take 1 tablet by mouth daily 30 tablet 11     blood glucose (NO BRAND SPECIFIED) lancets standard Use to test blood sugar four times daily or as directed. (Patient not taking: Reported on 11/13/2018) 100 Box 5     blood glucose monitoring (NO BRAND SPECIFIED) meter device kit Use to test blood sugar 4 times daily or as directed. (Patient not taking: Reported on 11/13/2018) 1 kit 0     blood glucose monitoring (NO BRAND SPECIFIED) test strip 1 strip by In Vitro route 4 times daily (before meals and nightly) Use to test blood sugars 4 times daily or as directed (Patient not taking: Reported on 11/13/2018) 100 strip 11     calcium carbonate (TUMS) 500 MG chewable tablet Take 2 tablets (1,000 mg) by mouth 3 times daily as needed for heartburn 150 tablet 3     CHANTIX STARTING MONTH OLIVERIO 0.5 MG X 11 & 1 MG X 42 tablet Dispense starter pack as written (Patient not taking: Reported on 11/13/2018) 1 tablet 0     empagliflozin (JARDIANCE) 10 MG TABS tablet Take 1 tablet by mouth daily 90 tablet 3     gabapentin (NEURONTIN)  100 MG capsule Take 2 capsules (200 mg) by mouth 3 times daily 90 capsule 0     glycerin, adult, 2 g SUPP Suppository Place 1 suppository rectally daily as needed for constipation 5 suppository 0     guaiFENesin-codeine (ROBITUSSIN AC) 100-10 MG/5ML solution Take 5-10 mLs by mouth every 6 hours as needed for cough 118 mL 0     hyoscyamine 0.125 MG TBDP Take 2 tablets (0.25 mg) by mouth 4 times daily as needed for cramping 60 tablet 0     ibuprofen (ADVIL/MOTRIN) 200 MG tablet Take 2 tablets (400 mg) by mouth every 8 hours as needed for pain (Patient not taking: Reported on 11/13/2018) 60 tablet 0     insulin glargine (LANTUS SOLOSTAR) 100 UNIT/ML pen Inject 20 Units Subcutaneous every morning 15 mL 0     insulin pen needle (B-D U/F) 31G X 8 MM Use 1 pen needles daily or as directed. (Patient not taking: Reported on 11/13/2018) 100 each 0     magnesium oxide (MAG-OX) 400 MG tablet Take 1 tablet (400 mg) by mouth daily (Patient not taking: Reported on 11/13/2018) 30 tablet 0     methylPREDNISolone (MEDROL DOSEPAK) 4 MG tablet Follow package instructions 21 tablet 0     montelukast (SINGULAIR) 10 MG tablet Take 1 tablet (10 mg) by mouth At Bedtime (Patient not taking: Reported on 11/13/2018) 30 tablet 1     omeprazole (PRILOSEC) 20 MG CR capsule Take 1 capsule (20 mg) by mouth 2 times daily 180 capsule 3     ondansetron (ZOFRAN-ODT) 4 MG ODT tab Take 1 tablet (4 mg) by mouth 2 times daily (Patient not taking: Reported on 11/13/2018) 6 tablet 0     pseudoePHEDrine (SUDAFED) 30 MG tablet Take 2 tablets (60 mg) by mouth every 6 hours as needed for congestion (Patient not taking: Reported on 11/13/2018) 20 tablet 0     ranitidine (ZANTAC) 150 MG tablet Take 1 tablet (150 mg) by mouth 2 times daily 60 tablet 3     simethicone (MYLICON) 80 MG chewable tablet Take 1 tablet (80 mg) by mouth 4 times daily 30 tablet 0     trimethoprim-polymyxin b (POLYTRIM) 21474-4.1 UNIT/ML-% ophthalmic solution Place 1 drop into the right eye  every 3 hours . Use while awake. 1 Bottle 0     Allergies:  The patientis allergic to metformin; milk [lac bovis]; tylenol [acetaminophen]; and dulaglutide.    Social history:  Social History     Tobacco Use     Smoking status: Current Some Day Smoker     Packs/day: 0.25     Types: Cigarettes     Last attempt to quit: 10/28/2016     Years since quittin.1     Smokeless tobacco: Former User   Substance Use Topics     Alcohol use: No     Alcohol/week: 0.0 oz     Comment: Last etoh in      Marital status: single.    Review of Systems:  There are no exam notes on file for this visit.     This procedure was performed under a collaborative agreement with Dr. Thanh Lundberg MD, Chief of Colon and Rectal Surgery, [facility].    Carrie Jain NP-C  Colon and Rectal Surgery  [facility]      This note was created using speech recognition software and may contain unintended word substitutions.      Again, thank you for allowing me to participate in the care of your patient.      Sincerely,    VIKASH Hidalgo CNP

## 2019-01-03 NOTE — PROGRESS NOTES
Colon and Rectal Surgery Clinic High Resolution Anoscopy Note    RE: Andrew Lockwood  : 1967  CHRSISY: 1/3/2019      Andrew Lockwood is a 51 year old male with a history of with HIV, DM II with anal cytology on 18 showing LSIL who presents today for high resolution anoscopy.     HPI: Andrew denies any anorectal complaints. He reports that his HIV is under good control.    ASSESSMENT: Written, informed consent was obtained prior to procedure.  Prior to the start of the procedure and with procedural staff participation, I verbally confirmed the patient s identity using two indicators, relevant allergies, that the procedure was appropriate and matched the consent or emergent situation, and that the correct equipment/implants were available. Immediately prior to starting the procedure I conducted the Time Out with the procedural staff and re-confirmed the patient s name, procedure, and site/side. (The Joint Commission universal protocol was followed.)  Yes    Sedation (Moderate or Deep): None    Anal cytology was not obtained with Dacron swab. Digital anal rectal exam was performed without any palpable lesions. Dilute acetic acid soak was completed for 2 minutes. Lubricant was used to insert the anoscope but he did not tolerate this so procedure was terminated.    PLAN: We discussed having high resolution anoscopy done in the OR versus in clinic after taking ativan. He would like to try again in clinic after taking ativan. Will reschedule him for this. Asked him to come one hour prior to his visit to sign his consent before taking ativan. Patient's questions were answered to his stated satisfaction and he is in agreement with this plan.    For details of past medical history, surgical history, family history, medications, allergies, and review of systems, please see details below.    Medical history:  Past Medical History:   Diagnosis Date     AIDS (H)      Allergic rhinitis due to other allergen     DNS      Chronic abdominal pain      CNS toxoplasmosis (H)      Diabetes type 2, controlled (H)      GERD (gastroesophageal reflux disease)      HIV (human immunodeficiency virus infection) (H)      Periungual wart      Sleep apnea     doesn't use CPAP       Surgical history:  Past Surgical History:   Procedure Laterality Date     C NONSPECIFIC PROCEDURE      right forearm fracture     COLONOSCOPY Left 1/22/2016    Procedure: COMBINED COLONOSCOPY, SINGLE OR MULTIPLE BIOPSY/POLYPECTOMY BY BIOPSY;  Surgeon: Clark Saini MD;  Location: [facility]     HC EXPLORE UNDESC TESTIS,INGUIN/SCROTAL       LAPAROSCOPIC APPENDECTOMY N/A 1/31/2018    Procedure: LAPAROSCOPIC APPENDECTOMY;  LAPAROSCOPIC APPENDECTOMY;  Surgeon: Dawn Holt MD;  [facility]     LAPAROSCOPY DIAGNOSTIC (GENERAL) N/A 7/26/2016    Procedure: LAPAROSCOPY DIAGNOSTIC (GENERAL);  Surgeon: Susannah Arriaga MD;  [facility]     LAPAROSCOPY DIAGNOSTIC (GENERAL) N/A 4/16/2018    Procedure: LAPAROSCOPY DIAGNOSTIC (GENERAL);  Diagnostic laparoscopy and lysis of adhesions;  Surgeon: Prince Dowling MD;  Location: [facility]     OPTICAL TRACKING SYSTEM CRANIOTOMY, EXCISE TUMOR, COMBINED Left 4/10/2015    Procedure: COMBINED OPTICAL TRACKING SYSTEM CRANIOTOMY, EXCISE TUMOR;  Surgeon: Mirlande Colmenares MD;  Location: [facility]     REPAIR GAMEKEEPER'S THUMB Right 12/2/2016    Procedure: REPAIR LIGAMENT ULNAR COLLATERAL THUMB (GAMEKEEPER'S);  Surgeon: Evin Zamorano MD;  [facility]       Family history:  Family History   Problem Relation Age of Onset     Diabetes Brother      Diabetes Father      Alzheimer Disease Father      Unknown/Adopted Mother      Diabetes Paternal Grandfather      Cancer No family hx of         no skin cancer     Skin Cancer No family hx of         no famiy hx of skin cancer     Glaucoma No family hx of      Macular Degeneration No family hx of        Medications:  Current Outpatient Medications   Medication Sig  Dispense Refill     LORazepam (ATIVAN) 1 MG tablet Take 1 tablet (1 mg) by mouth every 6 hours as needed for anxiety 1 tablet 0     albuterol (PROAIR HFA/PROVENTIL HFA/VENTOLIN HFA) 108 (90 Base) MCG/ACT inhaler Inhale 2 puffs into the lungs every 6 hours as needed for shortness of breath / dyspnea or wheezing (Patient not taking: Reported on 11/13/2018) 1 Inhaler 1     azithromycin (ZITHROMAX Z-OLIVERIO) 250 MG tablet Two tablets on the first day, then one tablet daily for the next 4 days 6 tablet 0     BASAGLAR 100 UNIT/ML injection Inject 20 Units Subcutaneous daily 15 mL 0     Bictegravir-Emtricitab-Tenofov -25 MG TABS Take 1 tablet by mouth daily 30 tablet 11     blood glucose (NO BRAND SPECIFIED) lancets standard Use to test blood sugar four times daily or as directed. (Patient not taking: Reported on 11/13/2018) 100 Box 5     blood glucose monitoring (NO BRAND SPECIFIED) meter device kit Use to test blood sugar 4 times daily or as directed. (Patient not taking: Reported on 11/13/2018) 1 kit 0     blood glucose monitoring (NO BRAND SPECIFIED) test strip 1 strip by In Vitro route 4 times daily (before meals and nightly) Use to test blood sugars 4 times daily or as directed (Patient not taking: Reported on 11/13/2018) 100 strip 11     calcium carbonate (TUMS) 500 MG chewable tablet Take 2 tablets (1,000 mg) by mouth 3 times daily as needed for heartburn 150 tablet 3     CHANTIX STARTING MONTH OLIVERIO 0.5 MG X 11 & 1 MG X 42 tablet Dispense starter pack as written (Patient not taking: Reported on 11/13/2018) 1 tablet 0     empagliflozin (JARDIANCE) 10 MG TABS tablet Take 1 tablet by mouth daily 90 tablet 3     gabapentin (NEURONTIN) 100 MG capsule Take 2 capsules (200 mg) by mouth 3 times daily 90 capsule 0     glycerin, adult, 2 g SUPP Suppository Place 1 suppository rectally daily as needed for constipation 5 suppository 0     guaiFENesin-codeine (ROBITUSSIN AC) 100-10 MG/5ML solution Take 5-10 mLs by mouth every  6 hours as needed for cough 118 mL 0     hyoscyamine 0.125 MG TBDP Take 2 tablets (0.25 mg) by mouth 4 times daily as needed for cramping 60 tablet 0     ibuprofen (ADVIL/MOTRIN) 200 MG tablet Take 2 tablets (400 mg) by mouth every 8 hours as needed for pain (Patient not taking: Reported on 11/13/2018) 60 tablet 0     insulin glargine (LANTUS SOLOSTAR) 100 UNIT/ML pen Inject 20 Units Subcutaneous every morning 15 mL 0     insulin pen needle (B-D U/F) 31G X 8 MM Use 1 pen needles daily or as directed. (Patient not taking: Reported on 11/13/2018) 100 each 0     magnesium oxide (MAG-OX) 400 MG tablet Take 1 tablet (400 mg) by mouth daily (Patient not taking: Reported on 11/13/2018) 30 tablet 0     methylPREDNISolone (MEDROL DOSEPAK) 4 MG tablet Follow package instructions 21 tablet 0     montelukast (SINGULAIR) 10 MG tablet Take 1 tablet (10 mg) by mouth At Bedtime (Patient not taking: Reported on 11/13/2018) 30 tablet 1     omeprazole (PRILOSEC) 20 MG CR capsule Take 1 capsule (20 mg) by mouth 2 times daily 180 capsule 3     ondansetron (ZOFRAN-ODT) 4 MG ODT tab Take 1 tablet (4 mg) by mouth 2 times daily (Patient not taking: Reported on 11/13/2018) 6 tablet 0     pseudoePHEDrine (SUDAFED) 30 MG tablet Take 2 tablets (60 mg) by mouth every 6 hours as needed for congestion (Patient not taking: Reported on 11/13/2018) 20 tablet 0     ranitidine (ZANTAC) 150 MG tablet Take 1 tablet (150 mg) by mouth 2 times daily 60 tablet 3     simethicone (MYLICON) 80 MG chewable tablet Take 1 tablet (80 mg) by mouth 4 times daily 30 tablet 0     trimethoprim-polymyxin b (POLYTRIM) 25511-3.1 UNIT/ML-% ophthalmic solution Place 1 drop into the right eye every 3 hours . Use while awake. 1 Bottle 0     Allergies:  The patientis allergic to metformin; milk [lac bovis]; tylenol [acetaminophen]; and dulaglutide.    Social history:  Social History     Tobacco Use     Smoking status: Current Some Day Smoker     Packs/day: 0.25     Types:  Cigarettes     Last attempt to quit: 10/28/2016     Years since quittin.1     Smokeless tobacco: Former User   Substance Use Topics     Alcohol use: No     Alcohol/week: 0.0 oz     Comment: Last etoh in      Marital status: single.    Review of Systems:  There are no exam notes on file for this visit.     This procedure was performed under a collaborative agreement with Dr. Thanh Lundberg MD, Chief of Colon and Rectal Surgery, [facility].    Carrie Jain NP-C  Colon and Rectal Surgery  [facility]      This note was created using speech recognition software and may contain unintended word substitutions.

## 2019-01-04 LAB
HIV1 RNA # PLAS NAA DL=20: <20 {COPIES}/ML
HIV1 RNA SERPL NAA+PROBE-LOG#: <1.3 {LOG_COPIES}/ML

## 2019-01-06 ENCOUNTER — HOSPITAL ENCOUNTER (OUTPATIENT)
Facility: CLINIC | Age: 54
Setting detail: OBSERVATION
Discharge: NURSING, REHAB OR LONG-TERM CARE | End: 2019-01-07
Attending: EMERGENCY MEDICINE | Admitting: EMERGENCY MEDICINE
Payer: COMMERCIAL

## 2019-01-06 ENCOUNTER — APPOINTMENT (OUTPATIENT)
Dept: GENERAL RADIOLOGY | Facility: CLINIC | Age: 54
End: 2019-01-06
Payer: COMMERCIAL

## 2019-01-06 DIAGNOSIS — J18.9 PNEUMONIA OF LEFT LOWER LOBE DUE TO INFECTIOUS ORGANISM: ICD-10-CM

## 2019-01-06 DIAGNOSIS — J18.9 PNEUMONIA OF LEFT UPPER LOBE DUE TO INFECTIOUS ORGANISM: Primary | ICD-10-CM

## 2019-01-06 LAB
ANION GAP SERPL CALCULATED.3IONS-SCNC: 8 MMOL/L (ref 3–14)
BASOPHILS # BLD AUTO: 0 10E9/L (ref 0–0.2)
BASOPHILS NFR BLD AUTO: 0.3 %
BUN SERPL-MCNC: 12 MG/DL (ref 7–30)
CALCIUM SERPL-MCNC: 9 MG/DL (ref 8.5–10.1)
CHLORIDE SERPL-SCNC: 104 MMOL/L (ref 94–109)
CO2 SERPL-SCNC: 24 MMOL/L (ref 20–32)
CREAT SERPL-MCNC: 0.65 MG/DL (ref 0.66–1.25)
DIFFERENTIAL METHOD BLD: ABNORMAL
EOSINOPHIL # BLD AUTO: 0.2 10E9/L (ref 0–0.7)
EOSINOPHIL NFR BLD AUTO: 1.3 %
ERYTHROCYTE [DISTWIDTH] IN BLOOD BY AUTOMATED COUNT: 12.9 % (ref 10–15)
GFR SERPL CREATININE-BSD FRML MDRD: >90 ML/MIN/{1.73_M2}
GLUCOSE BLDC GLUCOMTR-MCNC: 192 MG/DL (ref 70–99)
GLUCOSE BLDC GLUCOMTR-MCNC: 227 MG/DL (ref 70–99)
GLUCOSE SERPL-MCNC: 123 MG/DL (ref 70–99)
HCT VFR BLD AUTO: 37.6 % (ref 40–53)
HGB BLD-MCNC: 13.2 G/DL (ref 13.3–17.7)
IMM GRANULOCYTES # BLD: 0.4 10E9/L (ref 0–0.4)
IMM GRANULOCYTES NFR BLD: 3.2 %
LYMPHOCYTES # BLD AUTO: 2 10E9/L (ref 0.8–5.3)
LYMPHOCYTES NFR BLD AUTO: 18 %
MAGNESIUM SERPL-MCNC: 2 MG/DL (ref 1.6–2.3)
MCH RBC QN AUTO: 31.7 PG (ref 26.5–33)
MCHC RBC AUTO-ENTMCNC: 35.1 G/DL (ref 31.5–36.5)
MCV RBC AUTO: 90 FL (ref 78–100)
MONOCYTES # BLD AUTO: 0.8 10E9/L (ref 0–1.3)
MONOCYTES NFR BLD AUTO: 7.4 %
NEUTROPHILS # BLD AUTO: 7.8 10E9/L (ref 1.6–8.3)
NEUTROPHILS NFR BLD AUTO: 69.8 %
NRBC # BLD AUTO: 0 10*3/UL
NRBC BLD AUTO-RTO: 0 /100
PHOSPHATE SERPL-MCNC: 2.7 MG/DL (ref 2.5–4.5)
PLATELET # BLD AUTO: 427 10E9/L (ref 150–450)
POTASSIUM SERPL-SCNC: 3.2 MMOL/L (ref 3.4–5.3)
PROCALCITONIN SERPL-MCNC: 0.05 NG/ML
RBC # BLD AUTO: 4.16 10E12/L (ref 4.4–5.9)
SODIUM SERPL-SCNC: 136 MMOL/L (ref 133–144)
WBC # BLD AUTO: 11.2 10E9/L (ref 4–11)

## 2019-01-06 PROCEDURE — 87040 BLOOD CULTURE FOR BACTERIA: CPT | Performed by: NURSE PRACTITIONER

## 2019-01-06 PROCEDURE — 99284 EMERGENCY DEPT VISIT MOD MDM: CPT | Mod: 25 | Performed by: EMERGENCY MEDICINE

## 2019-01-06 PROCEDURE — 84100 ASSAY OF PHOSPHORUS: CPT | Performed by: EMERGENCY MEDICINE

## 2019-01-06 PROCEDURE — 96372 THER/PROPH/DIAG INJ SC/IM: CPT | Mod: XU

## 2019-01-06 PROCEDURE — 99220 ZZC INITIAL OBSERVATION CARE,LEVL III: CPT | Mod: Z6 | Performed by: EMERGENCY MEDICINE

## 2019-01-06 PROCEDURE — G0378 HOSPITAL OBSERVATION PER HR: HCPCS

## 2019-01-06 PROCEDURE — 25000128 H RX IP 250 OP 636: Performed by: EMERGENCY MEDICINE

## 2019-01-06 PROCEDURE — 96361 HYDRATE IV INFUSION ADD-ON: CPT

## 2019-01-06 PROCEDURE — 86367 STEM CELLS TOTAL COUNT: CPT | Performed by: EMERGENCY MEDICINE

## 2019-01-06 PROCEDURE — 80048 BASIC METABOLIC PNL TOTAL CA: CPT | Performed by: EMERGENCY MEDICINE

## 2019-01-06 PROCEDURE — 83735 ASSAY OF MAGNESIUM: CPT | Performed by: EMERGENCY MEDICINE

## 2019-01-06 PROCEDURE — 25000132 ZZH RX MED GY IP 250 OP 250 PS 637: Performed by: PHYSICIAN ASSISTANT

## 2019-01-06 PROCEDURE — 00000146 ZZHCL STATISTIC GLUCOSE BY METER IP

## 2019-01-06 PROCEDURE — 25000132 ZZH RX MED GY IP 250 OP 250 PS 637: Performed by: NURSE PRACTITIONER

## 2019-01-06 PROCEDURE — 96365 THER/PROPH/DIAG IV INF INIT: CPT | Performed by: EMERGENCY MEDICINE

## 2019-01-06 PROCEDURE — 84145 PROCALCITONIN (PCT): CPT | Performed by: EMERGENCY MEDICINE

## 2019-01-06 PROCEDURE — 71046 X-RAY EXAM CHEST 2 VIEWS: CPT

## 2019-01-06 PROCEDURE — 85025 COMPLETE CBC W/AUTO DIFF WBC: CPT | Performed by: EMERGENCY MEDICINE

## 2019-01-06 PROCEDURE — 96361 HYDRATE IV INFUSION ADD-ON: CPT | Performed by: EMERGENCY MEDICINE

## 2019-01-06 RX ORDER — POTASSIUM CHLORIDE 7.45 MG/ML
10 INJECTION INTRAVENOUS
Status: DISCONTINUED | OUTPATIENT
Start: 2019-01-06 | End: 2019-01-07 | Stop reason: HOSPADM

## 2019-01-06 RX ORDER — CODEINE PHOSPHATE AND GUAIFENESIN 10; 100 MG/5ML; MG/5ML
5 SOLUTION ORAL EVERY 4 HOURS PRN
Status: DISCONTINUED | OUTPATIENT
Start: 2019-01-06 | End: 2019-01-06

## 2019-01-06 RX ORDER — POTASSIUM CHLORIDE 29.8 MG/ML
20 INJECTION INTRAVENOUS
Status: DISCONTINUED | OUTPATIENT
Start: 2019-01-06 | End: 2019-01-07 | Stop reason: HOSPADM

## 2019-01-06 RX ORDER — POTASSIUM CHLORIDE 1.5 G/1.58G
20-40 POWDER, FOR SOLUTION ORAL
Status: DISCONTINUED | OUTPATIENT
Start: 2019-01-06 | End: 2019-01-07 | Stop reason: HOSPADM

## 2019-01-06 RX ORDER — ALBUTEROL SULFATE 0.83 MG/ML
2.5 SOLUTION RESPIRATORY (INHALATION)
Status: DISCONTINUED | OUTPATIENT
Start: 2019-01-06 | End: 2019-01-06

## 2019-01-06 RX ORDER — LEVOFLOXACIN 5 MG/ML
750 INJECTION, SOLUTION INTRAVENOUS ONCE
Status: COMPLETED | OUTPATIENT
Start: 2019-01-06 | End: 2019-01-06

## 2019-01-06 RX ORDER — INSULIN GLARGINE 100 [IU]/ML
20 INJECTION, SOLUTION SUBCUTANEOUS DAILY
Status: DISCONTINUED | OUTPATIENT
Start: 2019-01-06 | End: 2019-01-07 | Stop reason: HOSPADM

## 2019-01-06 RX ORDER — LEVOFLOXACIN 5 MG/ML
750 INJECTION, SOLUTION INTRAVENOUS EVERY 24 HOURS
Status: DISCONTINUED | OUTPATIENT
Start: 2019-01-07 | End: 2019-01-07 | Stop reason: HOSPADM

## 2019-01-06 RX ORDER — CODEINE PHOSPHATE AND GUAIFENESIN 10; 100 MG/5ML; MG/5ML
5-10 SOLUTION ORAL EVERY 6 HOURS PRN
Status: DISCONTINUED | OUTPATIENT
Start: 2019-01-06 | End: 2019-01-06

## 2019-01-06 RX ORDER — NICOTINE POLACRILEX 4 MG
15-30 LOZENGE BUCCAL
Status: DISCONTINUED | OUTPATIENT
Start: 2019-01-06 | End: 2019-01-07 | Stop reason: HOSPADM

## 2019-01-06 RX ORDER — GABAPENTIN 100 MG/1
200 CAPSULE ORAL 3 TIMES DAILY
Status: DISCONTINUED | OUTPATIENT
Start: 2019-01-06 | End: 2019-01-07 | Stop reason: HOSPADM

## 2019-01-06 RX ORDER — ONDANSETRON 2 MG/ML
4 INJECTION INTRAMUSCULAR; INTRAVENOUS EVERY 6 HOURS PRN
Status: DISCONTINUED | OUTPATIENT
Start: 2019-01-06 | End: 2019-01-07 | Stop reason: HOSPADM

## 2019-01-06 RX ORDER — POTASSIUM CHLORIDE 750 MG/1
20-40 TABLET, EXTENDED RELEASE ORAL
Status: DISCONTINUED | OUTPATIENT
Start: 2019-01-06 | End: 2019-01-07 | Stop reason: HOSPADM

## 2019-01-06 RX ORDER — CALCIUM CARBONATE 500 MG/1
1000 TABLET, CHEWABLE ORAL 3 TIMES DAILY PRN
Status: DISCONTINUED | OUTPATIENT
Start: 2019-01-06 | End: 2019-01-07 | Stop reason: HOSPADM

## 2019-01-06 RX ORDER — SODIUM CHLORIDE 9 MG/ML
1000 INJECTION, SOLUTION INTRAVENOUS CONTINUOUS
Status: DISCONTINUED | OUTPATIENT
Start: 2019-01-06 | End: 2019-01-07 | Stop reason: HOSPADM

## 2019-01-06 RX ORDER — ONDANSETRON 4 MG/1
4 TABLET, ORALLY DISINTEGRATING ORAL EVERY 6 HOURS PRN
Status: DISCONTINUED | OUTPATIENT
Start: 2019-01-06 | End: 2019-01-07 | Stop reason: HOSPADM

## 2019-01-06 RX ORDER — DEXTROSE MONOHYDRATE 25 G/50ML
25-50 INJECTION, SOLUTION INTRAVENOUS
Status: DISCONTINUED | OUTPATIENT
Start: 2019-01-06 | End: 2019-01-07 | Stop reason: HOSPADM

## 2019-01-06 RX ORDER — IPRATROPIUM BROMIDE AND ALBUTEROL SULFATE 2.5; .5 MG/3ML; MG/3ML
3 SOLUTION RESPIRATORY (INHALATION)
Status: DISCONTINUED | OUTPATIENT
Start: 2019-01-06 | End: 2019-01-07 | Stop reason: HOSPADM

## 2019-01-06 RX ORDER — POTASSIUM CL/LIDO/0.9 % NACL 10MEQ/0.1L
10 INTRAVENOUS SOLUTION, PIGGYBACK (ML) INTRAVENOUS
Status: DISCONTINUED | OUTPATIENT
Start: 2019-01-06 | End: 2019-01-07 | Stop reason: HOSPADM

## 2019-01-06 RX ORDER — LIDOCAINE 40 MG/G
CREAM TOPICAL
Status: DISCONTINUED | OUTPATIENT
Start: 2019-01-06 | End: 2019-01-07 | Stop reason: HOSPADM

## 2019-01-06 RX ORDER — BENZONATATE 100 MG/1
100 CAPSULE ORAL 3 TIMES DAILY PRN
Status: DISCONTINUED | OUTPATIENT
Start: 2019-01-06 | End: 2019-01-07 | Stop reason: HOSPADM

## 2019-01-06 RX ADMIN — SODIUM CHLORIDE 1000 ML: 9 INJECTION, SOLUTION INTRAVENOUS at 19:07

## 2019-01-06 RX ADMIN — BENZONATATE 100 MG: 100 CAPSULE, LIQUID FILLED ORAL at 21:00

## 2019-01-06 RX ADMIN — LEVOFLOXACIN 750 MG: 5 INJECTION, SOLUTION INTRAVENOUS at 15:30

## 2019-01-06 RX ADMIN — GABAPENTIN 200 MG: 100 CAPSULE ORAL at 20:23

## 2019-01-06 RX ADMIN — OMEPRAZOLE 20 MG: 20 CAPSULE, DELAYED RELEASE ORAL at 20:22

## 2019-01-06 RX ADMIN — SODIUM CHLORIDE 1000 ML: 900 INJECTION, SOLUTION INTRAVENOUS at 15:36

## 2019-01-06 RX ADMIN — GUAIFENESIN 10 ML: 100 SOLUTION ORAL at 20:59

## 2019-01-06 RX ADMIN — POTASSIUM CHLORIDE 20 MEQ: 750 TABLET, EXTENDED RELEASE ORAL at 22:37

## 2019-01-06 RX ADMIN — BICTEGRAVIR SODIUM, EMTRICITABINE, AND TENOFOVIR ALAFENAMIDE FUMARATE 1 TABLET: 50; 200; 25 TABLET ORAL at 20:59

## 2019-01-06 RX ADMIN — INSULIN GLARGINE 20 UNITS: 100 INJECTION, SOLUTION SUBCUTANEOUS at 20:20

## 2019-01-06 RX ADMIN — POTASSIUM CHLORIDE 40 MEQ: 750 TABLET, EXTENDED RELEASE ORAL at 20:23

## 2019-01-06 ASSESSMENT — ENCOUNTER SYMPTOMS
SHORTNESS OF BREATH: 1
COUGH: 1
FEVER: 0
ABDOMINAL PAIN: 0

## 2019-01-06 NOTE — H&P
"[facility]   History & Physical    Andrew Lockwood MRN# [id number]   Age: 51 year old YOB: 1967     Date of Admission: 1/6/2019    Primary Care Provider: Caitie Lamb         Chief Complaint:   \"cough\"         History of Present Illness:   Andrew Lockwood is a 51 year old male w/PMH significant for DANISHA, HIV, GERD, DM type 2, CNS toxoplasmosis, chronic abdominal pain who presented to the ED with ongoing productive cough.  He was seen on 1/1/19 for similar symptoms and had negative influenza, WBC 11.1, he was prescribed azithromycin.  He completed the course, but still has symptoms, so he returned to the ED.  He reports productive cough with white sputum and SOB.  Denies prior history of pneumonia.  He has been compliant with his medications, including his HIV medications.  He reports that he sleeps on the lightrail or in the shelter, but mostly on the lightrail.  Had a fever a few days ago, but denies a fever today.    In the ED the patient's vital signs were stable, he was afebrile.  .  SPO2 98% on room air. Labs: WBC 11.2.  Glucose 123 .  Chest x-ray showed a left lower lobe pneumonia.  He was given IVF and Levaquin.  He was subsequently admitted to the observation unit for continued antibiotics and SW to see for medical homeless shelter placement.            Review of Systems:     All others reviewed and are negative         Past Medical History:     Past Medical History:   Diagnosis Date     AIDS (H)      Allergic rhinitis due to other allergen     DNS     Chronic abdominal pain      CNS toxoplasmosis (H)      Diabetes type 2, controlled (H)      GERD (gastroesophageal reflux disease)      HIV (human immunodeficiency virus infection) (H)      Periungual wart      Sleep apnea     doesn't use CPAP             Past Surgical History:      Past Surgical History:   Procedure Laterality Date     C NONSPECIFIC PROCEDURE      right forearm fracture     " COLONOSCOPY Left 2016    Procedure: COMBINED COLONOSCOPY, SINGLE OR MULTIPLE BIOPSY/POLYPECTOMY BY BIOPSY;  Surgeon: Clark Saini MD;  Location: [facility]     HC EXPLORE UNDESC TESTIS,INGUIN/SCROTAL       LAPAROSCOPIC APPENDECTOMY N/A 2018    Procedure: LAPAROSCOPIC APPENDECTOMY;  LAPAROSCOPIC APPENDECTOMY;  Surgeon: Dawn Holt MD;  Location: [facility] OR     LAPAROSCOPY DIAGNOSTIC (GENERAL) N/A 2016    Procedure: LAPAROSCOPY DIAGNOSTIC (GENERAL);  Surgeon: Susannah Arriaga MD;  Location: [facility]     LAPAROSCOPY DIAGNOSTIC (GENERAL) N/A 2018    Procedure: LAPAROSCOPY DIAGNOSTIC (GENERAL);  Diagnostic laparoscopy and lysis of adhesions;  Surgeon: Prince Dowling MD;  Location: [facility] OR     OPTICAL TRACKING SYSTEM CRANIOTOMY, EXCISE TUMOR, COMBINED Left 4/10/2015    Procedure: COMBINED OPTICAL TRACKING SYSTEM CRANIOTOMY, EXCISE TUMOR;  Surgeon: Mirlande Colmenares MD;  Location: [facility] OR     REPAIR GAMEKEEPER'S THUMB Right 2016    Procedure: REPAIR LIGAMENT ULNAR COLLATERAL THUMB (GAMEKEEPER'S);  Surgeon: Evin Zamorano MD;  Location: [facility]             Family History:     Family History   Problem Relation Age of Onset     Diabetes Brother      Diabetes Father      Alzheimer Disease Father      Unknown/Adopted Mother      Diabetes Paternal Grandfather      Cancer No family hx of         no skin cancer     Skin Cancer No family hx of         no famiy hx of skin cancer     Glaucoma No family hx of      Macular Degeneration No family hx of              Social History:     Social History     Tobacco Use     Smoking status: Current Some Day Smoker     Packs/day: 0.25     Types: Cigarettes     Last attempt to quit: 10/28/2016     Years since quittin.1     Smokeless tobacco: Former User   Substance Use Topics     Alcohol use: No     Alcohol/week: 0.0 oz     Comment: Last etoh in              Medications:     No current facility-administered medications on file prior to  encounter.   Current Outpatient Medications on File Prior to Encounter:  albuterol (PROAIR HFA/PROVENTIL HFA/VENTOLIN HFA) 108 (90 Base) MCG/ACT inhaler Inhale 2 puffs into the lungs every 6 hours as needed for shortness of breath / dyspnea or wheezing (Patient not taking: Reported on 11/13/2018)   azithromycin (ZITHROMAX Z-OLIVERIO) 250 MG tablet Two tablets on the first day, then one tablet daily for the next 4 days   BASAGLAR 100 UNIT/ML injection Inject 20 Units Subcutaneous daily   Bictegravir-Emtricitab-Tenofov -25 MG TABS Take 1 tablet by mouth daily   blood glucose (NO BRAND SPECIFIED) lancets standard Use to test blood sugar four times daily or as directed. (Patient not taking: Reported on 11/13/2018)   blood glucose monitoring (NO BRAND SPECIFIED) meter device kit Use to test blood sugar 4 times daily or as directed. (Patient not taking: Reported on 11/13/2018)   blood glucose monitoring (NO BRAND SPECIFIED) test strip 1 strip by In Vitro route 4 times daily (before meals and nightly) Use to test blood sugars 4 times daily or as directed (Patient not taking: Reported on 11/13/2018)   calcium carbonate (TUMS) 500 MG chewable tablet Take 2 tablets (1,000 mg) by mouth 3 times daily as needed for heartburn   CHANTIX STARTING MONTH OLIVERIO 0.5 MG X 11 & 1 MG X 42 tablet Dispense starter pack as written (Patient not taking: Reported on 11/13/2018)   empagliflozin (JARDIANCE) 10 MG TABS tablet Take 1 tablet by mouth daily   gabapentin (NEURONTIN) 100 MG capsule Take 2 capsules (200 mg) by mouth 3 times daily   glycerin, adult, 2 g SUPP Suppository Place 1 suppository rectally daily as needed for constipation   guaiFENesin-codeine (ROBITUSSIN AC) 100-10 MG/5ML solution Take 5-10 mLs by mouth every 6 hours as needed for cough   hyoscyamine 0.125 MG TBDP Take 2 tablets (0.25 mg) by mouth 4 times daily as needed for cramping   ibuprofen (ADVIL/MOTRIN) 200 MG tablet Take 2 tablets (400 mg) by mouth every 8 hours as  needed for pain (Patient not taking: Reported on 11/13/2018)   insulin glargine (LANTUS SOLOSTAR) 100 UNIT/ML pen Inject 20 Units Subcutaneous every morning   insulin pen needle (B-D U/F) 31G X 8 MM Use 1 pen needles daily or as directed. (Patient not taking: Reported on 11/13/2018)   LORazepam (ATIVAN) 1 MG tablet Take 1 tablet (1 mg) by mouth every 6 hours as needed for anxiety   magnesium oxide (MAG-OX) 400 MG tablet Take 1 tablet (400 mg) by mouth daily (Patient not taking: Reported on 11/13/2018)   methylPREDNISolone (MEDROL DOSEPAK) 4 MG tablet Follow package instructions   montelukast (SINGULAIR) 10 MG tablet Take 1 tablet (10 mg) by mouth At Bedtime (Patient not taking: Reported on 11/13/2018)   omeprazole (PRILOSEC) 20 MG CR capsule Take 1 capsule (20 mg) by mouth 2 times daily   ondansetron (ZOFRAN-ODT) 4 MG ODT tab Take 1 tablet (4 mg) by mouth 2 times daily (Patient not taking: Reported on 11/13/2018)   pseudoePHEDrine (SUDAFED) 30 MG tablet Take 2 tablets (60 mg) by mouth every 6 hours as needed for congestion (Patient not taking: Reported on 11/13/2018)   ranitidine (ZANTAC) 150 MG tablet Take 1 tablet (150 mg) by mouth 2 times daily   simethicone (MYLICON) 80 MG chewable tablet Take 1 tablet (80 mg) by mouth 4 times daily   trimethoprim-polymyxin b (POLYTRIM) 08151-6.1 UNIT/ML-% ophthalmic solution Place 1 drop into the right eye every 3 hours . Use while awake.            Allergies:     Allergies   Allergen Reactions     Metformin      Abdominal pain     Milk [Lac Bovis] Hives     Tylenol [Acetaminophen] Itching     Dulaglutide Rash             Physical Exam:   /70   Pulse 102   Temp 98.9  F (37.2  C) (Oral)   Resp 16   Wt 77.1 kg (170 lb)   SpO2 98%   BMI 29.18 kg/m     GENERAL: Alert and oriented x 3. NAD.   HEENT: Anicteric sclera. PERRL. Mucous membranes moist and without lesions.   CV: RRR. S1, S2. No murmurs appreciated.   RESPIRATORY: Effort normal. Lungs with rales in LLL  GI:  Abdomen soft and non distended with normoactive bowel sounds present in all quadrants. No tenderness, rebound, guarding.   MUSCULOSKELETAL: No joint swelling or tenderness. Moves all extremities.   NEUROLOGICAL: No focal deficits. Strength 5/5 bilaterally in upper and lower extremities.   EXTREMITIES: No peripheral edema. Intact bilateral pedal pulses.   SKIN: No jaundice. No rashes.          Labs:   CBC:  Recent Labs   Lab Test 01/01/19  1822   WBC 11.1*   RBC 4.30*   HGB 13.4   HCT 40.3   MCV 94   MCH 31.2   MCHC 33.3   RDW 13.4          CMP:  Recent Labs   Lab Test 01/01/19  1822  11/06/18  1055   *   < > 133   POTASSIUM 3.2*   < > 3.2*   CHLORIDE 99   < > 101   LINDA 8.2*   < > 8.9   CO2 24   < > 22   BUN 12   < > 11   CR 0.65*   < > 0.86   *   < > 360*   AST  --   --  24   ALT  --   --  29   BILITOTAL  --   --  0.6   ALBUMIN  --   --  3.3*   PROTTOTAL  --   --  7.7   ALKPHOS  --   --  114    < > = values in this interval not displayed.       INR:   Recent Labs   Lab Test 02/05/18  0901   INR 1.06            Imaging:   PA and lateral chest     HISTORY: Shortness of air     COMPARISON STUDY: 12/20/2018.     FINDINGS: Patchy airspace opacity is noted in the left lower lobe.  Cardiac silhouette is nonenlarged. Lungs are otherwise clear.                                                                      IMPRESSION: Left lower lobe pneumonia.     JOSEFINA GALAN MD         Assessment and Plan:   Andrew Lockwood is a 51 year old male  w/PMH significant for DANISHA, HIV, GERD, DM type 2, CNS toxoplasmosis, chronic abdominal pain who presented to the ED with ongoing productive cough.     1. Community acquired pneumonia:  Completed a course of azithromycin today, but continuing to have a productive cough.  Chest x-ray shows LLL pneumonia.  Slight leukocytosis with WBC 11.2.  Blood cultures X 2 pending. Procal pending.   -admit to the observation unit  -continue IV Levaquin  -albuterol nebs Q 2 hours  prn  -tessalon prn, robitussin with codeine prn  -repeat CBC, BMP in am  -sputum culture and gram stain  -ID consult  -CD4 count now  -SW consult since patient is homeless    2. Hypokalemia: potassium 3.2.    -potassium replacement protocol  -add on mg and phos    Chronic medical problems:  #HIV:  Continue PTA bictegravir-emtricitab-tenofov  #DM type 2:  Continue PTA basaglar 20 units daily, hold Jardiance, blood sugars before meals and bedtime  #GERD: continue PTA PPI      Discussed with Dr. Galeas.     FEN: moderate CHO diet  Prophylaxis: anticipate short stay  Code Status: Full        Valeria Jaimes, APRN, CNP  Ascom #[id number]

## 2019-01-06 NOTE — ED PROVIDER NOTES
History     Chief Complaint   Patient presents with     Cough     HPI  Andrew Lockwood is a 51 year old male who has a history of being HIV positive and homeless.  He has frequent visits to the ED.  The patient comes in with 10 days of a worsening cough.  He finished his Zithromax which was prescribed on January 1 and feels that his cough is getting worse.  He does feel feverish but is homeless and is unable to measure his temperature.  He also feels somewhat rundown and has not been eating well or able to stay out of the cold.  The patient has no chest pain no sore throat or trouble swallowing.    I have reviewed the Medications, Allergies, Past Medical and Surgical History, and Social History in the Epic system.    Review of Systems   Constitutional: Negative for fever.   Respiratory: Positive for cough and shortness of breath.    Cardiovascular: Negative for chest pain.   Gastrointestinal: Negative for abdominal pain.   All other systems reviewed and are negative.      Physical Exam   BP: 116/70  Pulse: 102  Temp: 98.9  F (37.2  C)  Resp: 16  Weight: 77.1 kg (170 lb)  SpO2: 98 %      Physical Exam   Constitutional: He appears ill.   HENT:   Head: Atraumatic.   Mouth/Throat: Oropharynx is clear and moist. No oropharyngeal exudate.   Eyes: Pupils are equal, round, and reactive to light. No scleral icterus.   Cardiovascular: Normal heart sounds and intact distal pulses.   Pulmonary/Chest: No respiratory distress. He has no decreased breath sounds. He has rhonchi in the right lower field and the left lower field.   Abdominal: Soft. Bowel sounds are normal. There is no tenderness.   Musculoskeletal: He exhibits no edema or tenderness.   Skin: Skin is warm. No rash noted. He is not diaphoretic.       ED Course        Procedures             Critical Care time:  none             Labs Ordered and Resulted from Time of ED Arrival Up to the Time of Departure from the ED   CBC WITH PLATELETS DIFFERENTIAL - Abnormal; Notable  for the following components:       Result Value    WBC 11.2 (*)     RBC Count 4.16 (*)     Hemoglobin 13.2 (*)     Hematocrit 37.6 (*)     All other components within normal limits   BASIC METABOLIC PANEL - Abnormal; Notable for the following components:    Potassium 3.2 (*)     Glucose 123 (*)     Creatinine 0.65 (*)     All other components within normal limits   PERIPHERAL IV CATHETER            Assessments & Plan (with Medical Decision Making)   The patient has progression of his upper respiratory infection symptoms despite a course of Zithromax.  The patient was not hypoxic but given his underlying HIV he was sent for chest x-ray.  He does have a new pneumonia at the left base.  He is homeless and does not feel that he can have steady access to food and safe place to stay tonight.  He will be admitted to the observation unit to ensure that he is improving prior to discharge.  He was started on levofloxacin.  Laboratory tests show baseline sodium 136 normal kidney function, and white count of 11.2 which is where it was since the last visit, mildly elevated.  I [facility].  At this time he has no clinical signs of sepsis or respiratory failure.    I have reviewed the nursing notes.    I have reviewed the findings, diagnosis, plan and need for follow up with the patient.       Medication List      There are no discharge medications for this visit.         Final diagnoses:   Pneumonia of left lower lobe due to infectious organism (H)       1/6/2019   [facility], [place], EMERGENCY DEPARTMENT     Matt Elmore MD  01/06/19 [id number]

## 2019-01-06 NOTE — ED TRIAGE NOTES
Pt presents with cough. Pt has been treated for bronchitis and seen at this ED for the same problem. Pt states he has finished all his meds and he is still coughing.

## 2019-01-07 VITALS
HEART RATE: 89 BPM | DIASTOLIC BLOOD PRESSURE: 71 MMHG | OXYGEN SATURATION: 100 % | BODY MASS INDEX: 29.18 KG/M2 | TEMPERATURE: 98.2 F | RESPIRATION RATE: 16 BRPM | SYSTOLIC BLOOD PRESSURE: 109 MMHG | WEIGHT: 170 LBS

## 2019-01-07 LAB
ANION GAP SERPL CALCULATED.3IONS-SCNC: 10 MMOL/L (ref 3–14)
BASOPHILS # BLD AUTO: 0 10E9/L (ref 0–0.2)
BASOPHILS NFR BLD AUTO: 0.6 %
BUN SERPL-MCNC: 7 MG/DL (ref 7–30)
CALCIUM SERPL-MCNC: 8.1 MG/DL (ref 8.5–10.1)
CD34 CELLS # SPEC: 3 /UL
CD34 CELLS NFR SPEC: 0.03 %
CELL THERAPY PRODUCT NUMBER: NORMAL
CHLORIDE SERPL-SCNC: 108 MMOL/L (ref 94–109)
CO2 SERPL-SCNC: 21 MMOL/L (ref 20–32)
CREAT SERPL-MCNC: 0.58 MG/DL (ref 0.66–1.25)
DIFFERENTIAL METHOD BLD: ABNORMAL
EOSINOPHIL # BLD AUTO: 0.3 10E9/L (ref 0–0.7)
EOSINOPHIL NFR BLD AUTO: 4.4 %
ERYTHROCYTE [DISTWIDTH] IN BLOOD BY AUTOMATED COUNT: 13 % (ref 10–15)
GFR SERPL CREATININE-BSD FRML MDRD: >90 ML/MIN/{1.73_M2}
GLUCOSE BLDC GLUCOMTR-MCNC: 187 MG/DL (ref 70–99)
GLUCOSE BLDC GLUCOMTR-MCNC: 221 MG/DL (ref 70–99)
GLUCOSE BLDC GLUCOMTR-MCNC: 228 MG/DL (ref 70–99)
GLUCOSE BLDC GLUCOMTR-MCNC: 308 MG/DL (ref 70–99)
GLUCOSE SERPL-MCNC: 221 MG/DL (ref 70–99)
HCT VFR BLD AUTO: 35.6 % (ref 40–53)
HGB BLD-MCNC: 11.9 G/DL (ref 13.3–17.7)
IFC SPECIMEN: NORMAL
IMM GRANULOCYTES # BLD: 0.3 10E9/L (ref 0–0.4)
IMM GRANULOCYTES NFR BLD: 4.3 %
LYMPHOCYTES # BLD AUTO: 1.8 10E9/L (ref 0.8–5.3)
LYMPHOCYTES NFR BLD AUTO: 25.6 %
MCH RBC QN AUTO: 30.8 PG (ref 26.5–33)
MCHC RBC AUTO-ENTMCNC: 33.4 G/DL (ref 31.5–36.5)
MCV RBC AUTO: 92 FL (ref 78–100)
MONOCYTES # BLD AUTO: 0.6 10E9/L (ref 0–1.3)
MONOCYTES NFR BLD AUTO: 8.3 %
NEUTROPHILS # BLD AUTO: 4.1 10E9/L (ref 1.6–8.3)
NEUTROPHILS NFR BLD AUTO: 56.8 %
NRBC # BLD AUTO: 0 10*3/UL
NRBC BLD AUTO-RTO: 0 /100
PLATELET # BLD AUTO: 437 10E9/L (ref 150–450)
POTASSIUM SERPL-SCNC: 3.5 MMOL/L (ref 3.4–5.3)
POTASSIUM SERPL-SCNC: 3.5 MMOL/L (ref 3.4–5.3)
RBC # BLD AUTO: 3.86 10E12/L (ref 4.4–5.9)
SODIUM SERPL-SCNC: 139 MMOL/L (ref 133–144)
VIABLE CD34 CELLS NFR FLD: 85.9 %
WBC # BLD AUTO: 7.2 10E9/L (ref 4–11)

## 2019-01-07 PROCEDURE — 96372 THER/PROPH/DIAG INJ SC/IM: CPT | Mod: XU

## 2019-01-07 PROCEDURE — 25000132 ZZH RX MED GY IP 250 OP 250 PS 637: Performed by: NURSE PRACTITIONER

## 2019-01-07 PROCEDURE — 25000132 ZZH RX MED GY IP 250 OP 250 PS 637: Performed by: PHYSICIAN ASSISTANT

## 2019-01-07 PROCEDURE — 25000128 H RX IP 250 OP 636: Performed by: NURSE PRACTITIONER

## 2019-01-07 PROCEDURE — 00000146 ZZHCL STATISTIC GLUCOSE BY METER IP

## 2019-01-07 PROCEDURE — 85025 COMPLETE CBC W/AUTO DIFF WBC: CPT | Performed by: PHYSICIAN ASSISTANT

## 2019-01-07 PROCEDURE — 25000131 ZZH RX MED GY IP 250 OP 636 PS 637: Performed by: PHYSICIAN ASSISTANT

## 2019-01-07 PROCEDURE — 96376 TX/PRO/DX INJ SAME DRUG ADON: CPT

## 2019-01-07 PROCEDURE — 84132 ASSAY OF SERUM POTASSIUM: CPT | Mod: 91 | Performed by: EMERGENCY MEDICINE

## 2019-01-07 PROCEDURE — 96361 HYDRATE IV INFUSION ADD-ON: CPT

## 2019-01-07 PROCEDURE — 36415 COLL VENOUS BLD VENIPUNCTURE: CPT | Performed by: NURSE PRACTITIONER

## 2019-01-07 PROCEDURE — 36415 COLL VENOUS BLD VENIPUNCTURE: CPT | Performed by: PHYSICIAN ASSISTANT

## 2019-01-07 PROCEDURE — 40000275 ZZH STATISTIC RCP TIME EA 10 MIN

## 2019-01-07 PROCEDURE — 25000128 H RX IP 250 OP 636: Performed by: EMERGENCY MEDICINE

## 2019-01-07 PROCEDURE — 80048 BASIC METABOLIC PNL TOTAL CA: CPT | Performed by: NURSE PRACTITIONER

## 2019-01-07 PROCEDURE — 36415 COLL VENOUS BLD VENIPUNCTURE: CPT | Performed by: EMERGENCY MEDICINE

## 2019-01-07 PROCEDURE — G0378 HOSPITAL OBSERVATION PER HR: HCPCS

## 2019-01-07 PROCEDURE — 99217 ZZC OBSERVATION CARE DISCHARGE: CPT | Mod: Z6 | Performed by: EMERGENCY MEDICINE

## 2019-01-07 PROCEDURE — 25000125 ZZHC RX 250: Performed by: PHYSICIAN ASSISTANT

## 2019-01-07 PROCEDURE — 94640 AIRWAY INHALATION TREATMENT: CPT | Mod: 76

## 2019-01-07 PROCEDURE — 94640 AIRWAY INHALATION TREATMENT: CPT

## 2019-01-07 RX ORDER — LEVOFLOXACIN 750 MG/1
750 TABLET, FILM COATED ORAL DAILY
Qty: 7 TABLET | Refills: 0 | Status: SHIPPED | OUTPATIENT
Start: 2019-01-07 | End: 2019-03-08

## 2019-01-07 RX ORDER — IPRATROPIUM BROMIDE AND ALBUTEROL SULFATE 2.5; .5 MG/3ML; MG/3ML
3 SOLUTION RESPIRATORY (INHALATION) EVERY 4 HOURS PRN
Status: DISCONTINUED | OUTPATIENT
Start: 2019-01-07 | End: 2019-01-07 | Stop reason: HOSPADM

## 2019-01-07 RX ORDER — IPRATROPIUM BROMIDE AND ALBUTEROL SULFATE 2.5; .5 MG/3ML; MG/3ML
3 SOLUTION RESPIRATORY (INHALATION)
Status: DISCONTINUED | OUTPATIENT
Start: 2019-01-07 | End: 2019-01-07

## 2019-01-07 RX ADMIN — LEVOFLOXACIN 750 MG: 5 INJECTION, SOLUTION INTRAVENOUS at 14:14

## 2019-01-07 RX ADMIN — IPRATROPIUM BROMIDE AND ALBUTEROL SULFATE 3 ML: .5; 3 SOLUTION RESPIRATORY (INHALATION) at 13:04

## 2019-01-07 RX ADMIN — BENZOCAINE AND MENTHOL 1 LOZENGE: 15; 3.6 LOZENGE ORAL at 06:32

## 2019-01-07 RX ADMIN — IPRATROPIUM BROMIDE AND ALBUTEROL SULFATE 3 ML: .5; 3 SOLUTION RESPIRATORY (INHALATION) at 09:09

## 2019-01-07 RX ADMIN — GUAIFENESIN 10 ML: 100 SOLUTION ORAL at 07:44

## 2019-01-07 RX ADMIN — SODIUM CHLORIDE 1000 ML: 9 INJECTION, SOLUTION INTRAVENOUS at 03:04

## 2019-01-07 RX ADMIN — GUAIFENESIN 10 ML: 100 SOLUTION ORAL at 03:03

## 2019-01-07 RX ADMIN — OMEPRAZOLE 20 MG: 20 CAPSULE, DELAYED RELEASE ORAL at 07:53

## 2019-01-07 RX ADMIN — BENZOCAINE AND MENTHOL 1 LOZENGE: 15; 3.6 LOZENGE ORAL at 09:14

## 2019-01-07 RX ADMIN — SODIUM CHLORIDE 1000 ML: 9 INJECTION, SOLUTION INTRAVENOUS at 11:03

## 2019-01-07 RX ADMIN — INSULIN ASPART 2 UNITS: 100 INJECTION, SOLUTION INTRAVENOUS; SUBCUTANEOUS at 10:27

## 2019-01-07 RX ADMIN — INSULIN GLARGINE 20 UNITS: 100 INJECTION, SOLUTION SUBCUTANEOUS at 07:51

## 2019-01-07 RX ADMIN — GABAPENTIN 200 MG: 100 CAPSULE ORAL at 14:14

## 2019-01-07 RX ADMIN — INSULIN ASPART 4 UNITS: 100 INJECTION, SOLUTION INTRAVENOUS; SUBCUTANEOUS at 14:15

## 2019-01-07 NOTE — PLAN OF CARE
Outpatient/Observation goals to be met before discharge home:     Dyspnea improved and oxygen saturations greater than 88% on room air or prior home oxygen levels: Yes  - Tolerates oral antibiotics or has plans for home infusion set up.: No  - Vital signs normal or at patient baseline: Yes  - Infection is improving: No  - Return to baseline functional status: No  - Safe disposition plan has been identified: No

## 2019-01-07 NOTE — PLAN OF CARE
List all goals to be met before discharge home:   1. Dyspnea improved and oxygen saturations greater than 88% on room air or prior home oxygen levels - Yes.  2. Tolerates oral antibiotics or has plans for home infusion set up. - No, received IV Abx in ED  3.  Vital signs normal or at patient baseline - Yes.    4. Infection is improving - No, coughs.  5. Return to baseline functional status - No.  6. Safe disposition plan has been identified - No.    - Nurse to notify provider when observation goals have been met and patient is ready for discharge

## 2019-01-07 NOTE — PLAN OF CARE
Observation Goals:  Prior to Discharge    List all goals to be met before discharge home:   1. Dyspnea improved and oxygen saturations greater than 88% on room air or prior home oxygen levels - YES, mid 90's on RA  2. Tolerates oral antibiotics or has plans for home infusion set up. - NO, received IV Abx in ED  3.  Vital signs normal or at patient baseline - YES  /66 (BP Location: Left arm)   Pulse 98   Temp 98.1  F (36.7  C) (Oral)   Resp 16   Wt 77.1 kg (170 lb)   SpO2 95%   BMI 29.18 kg/m    4. Infection is improving - NO, frequent cough  5. Return to baseline functional status - NO  6. Safe disposition plan has been identified - NO    - Nurse to notify provider when observation goals have been met and patient is ready for discharge

## 2019-01-07 NOTE — DISCHARGE SUMMARY
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  Final   05/09/2016 No growth  Final   05/09/2016 No growth  Final   01/26/2016   Final    No Salmonella, Shigella, Campylobacter, E. coli O157, Aeromonas,   or Plesiomonas   isolated.     01/18/2016   Final    Culture negative for acid fast bacilli  Assayed at [facility] [address]     01/18/2016   Final    Culture negative for acid fast bacilli  Assayed at [facility] [address]     01/15/2016   Final    The Microbiology Lab does not accept stool specimens for routine   culture if the   patient has been hospitalized for 3 days or longer and does not   have a   diagnosis of gastroenteritis.     01/13/2016 Test reordered as miscellaneous test on 1/13/16.    Final   01/11/2016   Final    Culture negative for acid fast bacilli  Assayed at [facility] [address]     01/10/2016 No growth after 14 days  Final   01/10/2016 No growth  Final   01/10/2016 No growth  Final   01/09/2016 No growth  Final   01/09/2016 No growth  Final   01/09/2016 No growth  Final   12/25/2015 No growth  Final   12/25/2015 No growth  Final   07/07/2015 No growth  Final   07/07/2015 No growth  Final   07/07/2015 No growth  Final   04/27/2015  No growth  Final   04/26/2015 No growth  Final   04/26/2015 No growth  Final   04/25/2015 No growth  Final   04/25/2015 No growth  Final   04/18/2015 No growth  Final   04/10/2015 (A)  Final    On day 2, isolated in broth only: Coagulase negative   Staphylococcus not isolated   or reported on routine culture  Critical Value/Significant Value, preliminary result only, called   to and read   back by Maranda Maldonado RN (4/12/15 @ 1013) JR  On day 14, isolated in broth only: Propionibacterium acnes   Susceptibilities done   on aerobic culture.     04/10/2015 Culture negative after 4 weeks  Final   04/10/2015 (A)  Final    On day 4, isolated in broth only: Propionibacterium acnes  Critical Value/Significant Value called to and read back by   Nena Rainey Rn   at 1414 on 04.15.15 by luiza  Cefotaxime Testing unavailable due to 's backorder.     12/15/2011 No Beta Streptococcus isolated  Final   01/18/2006 No growth  Final           Other Laboratory Data:    Urine Studies    Recent Labs   Lab Test 12/19/18  0611 10/30/18  1810 10/05/18  0836 09/09/18  0923 07/03/18  1040 04/16/18  0645 02/04/18  1640   LEUKEST Negative Negative Negative Negative Negative Negative   Negative   WBCU 0 <1  --   --  <1 None 2       Inflammatory Markers    Recent Labs   Lab Test 10/08/18  0720 10/06/18  0658 10/05/18  0818 03/06/18  2105 12/28/16  1540 12/26/16  2213 12/18/15  0354 07/07/15  2205 05/25/15  2217   SED  --   --  23*  --   --   --   --  25* 37*   CRP <2.9 15.0* 14.0* 53.0* <2.9 <2.9 5.3 7.8 5.0       Hematology Studies    Recent Labs   Lab Test 01/07/19  0644 01/06/19  1537 01/01/19  1822 12/19/18  0635 12/19/18  0610 11/06/18  1055 10/30/18  1722   WBC 7.2 11.2* 11.1* 7.9 Unsatisfactory specimen - clotted 7.0 5.7     ANEU 4.1 7.8 8.1 5.2  --  4.7 2.8   AEOS 0.3 0.2 0.2 0.4  --  0.1 0.2   HGB 11.9* 13.2* 13.4 13.9 Unsatisfactory specimen - clotted 13.5   15.2   MCV 92 90 94 94 Unsatisfactory specimen - clotted 89 93     427 326 427 Unsatisfactory specimen - clotted 249 242       Metabolic Studies     Recent Labs   Lab Test 01/07/19  0556 01/07/19  0217 01/06/19  1537 01/01/19  1822 12/19/18  0610 11/06/18  1055     --  136 132* 137 133   POTASSIUM 3.5 3.5 3.2* 3.2* 4.3 3.2*   CHLORIDE 108  --  104 99 106 101   CO2 21  --  24 24 25 22   BUN 7  --  12 12 15 11   CR 0.58*  --  0.65* 0.65* 0.82 0.86   GFRESTIMATED >90  --  >90 >90 >90 >90       Hepatic Studies    Recent Labs   Lab Test 11/06/18  1055 10/30/18  1640 10/08/18  0720 10/07/18  0813 10/06/18  0658 10/05/18  0818   BILITOTAL 0.6 1.1 0.6 0.8 0.7 0.7   ALKPHOS 114 129 95 104 100 144   ALBUMIN 3.3* 4.1 2.9* 2.9* 2.8* 3.8   AST 24 32 12 10 13 15   ALT 29 34 18 18 19 25       Thyroid Studies    Recent Labs   Lab Test 02/15/18  2158 05/23/17  1247 04/14/15  0711   TSH 3.57 3.90 2.78   T4  --   --  1.02       Vancomycin Levels    Recent Labs   Lab Test 04/14/15  0711   VANCOMYCIN 15.4       Virology:  Hepatitis B Testing   Recent Labs   Lab Test 04/13/15  0816   HBCAB Nonreactive   HEPBANG Nonreactive   HBCM Nonreactive   A nonreactive result suggests lack of recent exposure to the   virus in the   preceding 6 months.     HBEABY Negative  Reference range: Negative  (Note)  Performed by [facility],  [address], [place] [address] [phone]  www.[email or web address], Nayan Corley MD, Lab. Director     HBEAGN Negative  Reference range: Negative  (Note)  Performed by [facility],  [address], [place] [id number] [phone]  www.[email or web address], Nayan Corley MD, Lab. Director       Hepatitis C Testing     Hepatitis C Antibody   Date Value Ref Range Status   03/02/2017  NR Final    Nonreactive   Assay performance characteristics have not been established for   newborns,   infants, and children     04/13/2015  NR Final    Nonreactive   Assay performance characteristics have not been established for   newborns,   infants, and children       Respiratory Virus  Testing    RSV Rapid Antigen Result   Date Value Ref Range Status   01/31/2018 Positive (A) NEG^Negative Final     Comment:     Test results must be correlated with clinical data. If   necessary, results   should be confirmed by a molecular assay or viral culture.       Imaging:  CXR(1/6) - Left lower lobe pneumonia.      Recent Results (from the past 48 hour(s))   XR Chest 2 Views    Narrative    PA and lateral chest    HISTORY: Shortness of air    COMPARISON STUDY: 12/20/2018.    FINDINGS: Patchy airspace opacity is noted in the left lower lobe.  Cardiac silhouette is nonenlarged. Lungs are otherwise clear.      Impression    IMPRESSION: Left lower lobe pneumonia.    JOSEFINA GALAN MD                Pending Results:   Unresulted Labs Ordered in the Past 30 Days of this Admission     Date and Time Order Name Status Description    1/6/2019 1537 CD34 Absolute Count In process                   Discharge Instructions and Follow-Up:   No discharge procedures on file.       Attestation:  Anita Loaiza PA-C

## 2019-01-07 NOTE — PROGRESS NOTES
/71 (BP Location: Left arm)   Pulse 89   Temp 98.2  F (36.8  C)   Resp 16   Wt 77.1 kg (170 lb)   SpO2 100%   BMI 29.18 kg/m        Patient's condition and vital Signs are stable/WNL. Discharge instructions reviewed with patient and questions answered. Patient verbalizes understanding. Pain under control.  Patient is tolerating moderate carbohydrate diet and denies any N/V.  Patient has all belongings and medications in his room. PIV to be removed after IV levofloxacin completed. Patient to be discharged to Medical Shelter via transport from a cab.

## 2019-01-07 NOTE — PLAN OF CARE
Observation Goals:  Prior to Discharge     List all goals to be met before discharge home:   1. Dyspnea improved and oxygen saturations greater than 88% on room air or prior home oxygen levels - Yes.  2. Tolerates oral antibiotics or has plans for home infusion set up. - No, received IV Abx in ED  3.  Vital signs normal or at patient baseline - Yes.    4. Infection is improving - No, coughs.  5. Return to baseline functional status - No.  6. Safe disposition plan has been identified - No.    - Nurse to notify provider when observation goals have been met and patient is ready for discharge

## 2019-01-07 NOTE — PROGRESS NOTES
Social Work Services Progress Note    Hospital Day: 2  Date of Initial Social Work Evaluation:  1/7/19  Collaborated with:      Data:  Homeless patient admitted to obs unit for tx of pneumonia    Intervention:  Met with patient re: disposition.   He agrees to medical shelter placement.  Call placed to [facility] at [phone] to confirm patient's eligibility for medical shelter bed.  He has a confirmed reservation for [facility] ([phone]) starting today.  Spoke with Cindy at [facility] ([phone]) to notify that patient will be arriving today.      Received call from  DAYNA at [facility] [phone], who works with patient regularly.  Reports patient appears to be managing well at times, but needs a great deal of structure to follow through.  Simon was made aware of plan to discharge to medical shelter bed today.      Assessment:  Patient agrees with medical bed shelter placement today.      Plan:    Anticipated Disposition:  Medical shelter bed placement.  Transportation provided by cab to ensure patient's arrival at shelter.      Barriers to d/c plan:  none    Follow Up:  Patient will need to be discharged with a copy of his discharge summary per request of medical shelter.  RN aware.     Meron HADLEY, MSW, RN  Social Work Services, Emergency Dept [facility]  Pager: [phone] Mon-Sat 9 am - 9 pm, on-call/after hours pager [phone]

## 2019-01-07 NOTE — PROGRESS NOTES
Patient arrived to unit via litter from [facility] ED, ambulated to bed.

## 2019-01-09 ENCOUNTER — HOSPITAL ENCOUNTER (OUTPATIENT)
Dept: MRI IMAGING | Facility: CLINIC | Age: 54
Discharge: HOME | End: 2019-01-09
Attending: INTERNAL MEDICINE | Admitting: INTERNAL MEDICINE
Payer: COMMERCIAL

## 2019-01-09 DIAGNOSIS — K56.0 ADYNAMIC ILEUS (H): ICD-10-CM

## 2019-01-09 PROCEDURE — 40000556 ZZH STATISTIC PERIPHERAL IV START W US GUIDANCE

## 2019-01-09 PROCEDURE — A9585 GADOBUTROL INJECTION: HCPCS | Performed by: RADIOLOGY

## 2019-01-09 PROCEDURE — 25000128 H RX IP 250 OP 636: Performed by: RADIOLOGY

## 2019-01-09 PROCEDURE — 25500064 ZZH RX 255 OP 636: Performed by: RADIOLOGY

## 2019-01-09 PROCEDURE — 72197 MRI PELVIS W/O & W/DYE: CPT

## 2019-01-09 RX ORDER — GADOBUTROL 604.72 MG/ML
10 INJECTION INTRAVENOUS ONCE
Status: COMPLETED | OUTPATIENT
Start: 2019-01-09 | End: 2019-01-09

## 2019-01-09 RX ADMIN — GLUCAGON HYDROCHLORIDE 1 MG: 1 INJECTION, POWDER, FOR SOLUTION INTRAMUSCULAR; INTRAVENOUS; SUBCUTANEOUS at 13:25

## 2019-01-09 RX ADMIN — GADOBUTROL 7.5 ML: 604.72 INJECTION INTRAVENOUS at 13:31

## 2019-01-09 NOTE — TELEPHONE ENCOUNTER
FUTURE VISIT INFORMATION      FUTURE VISIT INFORMATION:    Date: 1/9/19    Time:     Location: [facility]  REFERRAL INFORMATION:    Referring provider:  self    Referring providers clinic:      Reason for visit/diagnosis      RECORDS REQUESTED FROM:       Clinic name Comments Records Status Imaging Status    Tried calling pt 1/9/19 10am, unable to reach

## 2019-01-10 ENCOUNTER — TELEPHONE (OUTPATIENT)
Dept: INTERNAL MEDICINE | Facility: CLINIC | Age: 54
End: 2019-01-10

## 2019-01-10 ENCOUNTER — PRE VISIT (OUTPATIENT)
Dept: ORTHOPEDICS | Facility: CLINIC | Age: 54
End: 2019-01-10

## 2019-01-10 ENCOUNTER — OFFICE VISIT (OUTPATIENT)
Dept: ORTHOPEDICS | Facility: CLINIC | Age: 54
End: 2019-01-10
Payer: COMMERCIAL

## 2019-01-10 VITALS
DIASTOLIC BLOOD PRESSURE: 71 MMHG | WEIGHT: 170 LBS | SYSTOLIC BLOOD PRESSURE: 109 MMHG | HEIGHT: 64 IN | BODY MASS INDEX: 29.02 KG/M2

## 2019-01-10 DIAGNOSIS — M75.21 BICEPS TENDINITIS OF RIGHT SHOULDER: Primary | ICD-10-CM

## 2019-01-10 DIAGNOSIS — G89.29 CHRONIC RIGHT SHOULDER PAIN: ICD-10-CM

## 2019-01-10 DIAGNOSIS — M25.511 CHRONIC RIGHT SHOULDER PAIN: ICD-10-CM

## 2019-01-10 RX ORDER — LIDOCAINE HYDROCHLORIDE 10 MG/ML
2 INJECTION, SOLUTION EPIDURAL; INFILTRATION; INTRACAUDAL; PERINEURAL
Status: DISCONTINUED | OUTPATIENT
Start: 2019-01-10 | End: 2019-03-08

## 2019-01-10 RX ORDER — TRIAMCINOLONE ACETONIDE 40 MG/ML
40 INJECTION, SUSPENSION INTRA-ARTICULAR; INTRAMUSCULAR
Status: DISCONTINUED | OUTPATIENT
Start: 2019-01-10 | End: 2019-03-08

## 2019-01-10 RX ADMIN — TRIAMCINOLONE ACETONIDE 40 MG: 40 INJECTION, SUSPENSION INTRA-ARTICULAR; INTRAMUSCULAR at 14:25

## 2019-01-10 RX ADMIN — LIDOCAINE HYDROCHLORIDE 2 ML: 10 INJECTION, SOLUTION EPIDURAL; INFILTRATION; INTRACAUDAL; PERINEURAL at 14:25

## 2019-01-10 ASSESSMENT — MIFFLIN-ST. JEOR: SCORE: 1527.11

## 2019-01-10 NOTE — PROGRESS NOTES
Subjective:   Andrew Lockwood is a 51 year old HIV+ male who presents with concerns of chronic right shoulder pain times 1 year.  He denies any injuries.  He endorses the pain is over the anterior aspect of the shoulder.  Pain is made worse with lifting and use of the shoulder.  He endorses full range of motion and full strength.  Overall he feels his symptoms are worsening.  He has not participated in physical therapy.  Prior x-ray this year which was normal.    Background:   Date of injury: None   Duration of symptoms: 1 years  Mechanism of Injury: Insidious Onset; Unknown   Aggravating factors: Sleeping at night, laying on the right side  Relieving Factors: Rest  Prior Evaluation: None    PAST MEDICAL, SOCIAL, SURGICAL AND FAMILY HISTORY: He  has a past medical history of AIDS (H), Allergic rhinitis due to other allergen, Chronic abdominal pain, CNS toxoplasmosis (H), Diabetes type 2, controlled (H), GERD (gastroesophageal reflux disease), HIV (human immunodeficiency virus infection) (H), Periungual wart, and Sleep apnea. He also has no past medical history of Hypertension.  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  His family history includes Alzheimer Disease in his father; Diabetes in his brother, father, and paternal grandfather; Unknown/Adopted in his mother.  He reports that he has been smoking cigarettes.  He has been smoking about 0.25 packs per day. He has quit using smokeless tobacco. He reports that he does not drink alcohol or use drugs.    ALLERGIES: He is allergic to metformin; milk [lac bovis]; tylenol  "[acetaminophen]; and dulaglutide.    CURRENT MEDICATIONS: He has a current medication list which includes the following prescription(s): insulin glargine, bictegravir-emtricitabine-tenofovir, calcium carbonate, empagliflozin, gabapentin, glycerin, guaifenesin, hyoscyamine, insulin glargine, levofloxacin, lorazepam, methylprednisolone, omeprazole, ranitidine, simethicone, trimethoprim-polymyxin b, albuterol, blood glucose, blood glucose monitoring, blood glucose, chantix starting month haleigh, ibuprofen, insulin pen needle, magnesium oxide, montelukast, ondansetron, and pseudoephedrine.     REVIEW OF SYSTEMS: 9 point review of systems is negative except as noted above.     Exam:   /71   Ht 1.626 m (5' 4\")   Wt 77.1 kg (170 lb)   BMI 29.18 kg/m             CONSTITUTIONAL: healthy, alert and no distress  HEAD: Normocephalic.   SKIN: no suspicious lesions or rashes  GAIT: normal  NEUROLOGIC: Non-focal  PSYCHIATRIC: affect normal/bright and mentation appears normal.    MUSCULOSKELETAL:     R Shoulder: No effusion or deformity. Full and equal ROM with flexion, abduction, internal, and external rotation.   NTTP over A/C joint, clavicle, and anterior glenoid.  TTP over long head of biceps.  5/5 strength with testing of the supraspinatus, infraspinatus, teres minor, and subscapularis without discomfort. Mild impingement signs with Nears or [place] tests. Positive [place]'s. No pain with cross arm reach. Negative speed's and Yergason's.  No apprehension or sulcus sign.    Contralateral shoulder with 5/5 strength and full ROM w/o pain.      Exam: 4 views of the right shoulder dated 9/19/2018.     COMPARISON: 4/6/2015.     CLINICAL HISTORY: Right shoulder pain.     FINDINGS: Neutral, Grashey, axillary, and Y views of the right  shoulder were obtained. No evidence of fracture or dislocation  involving the right shoulder. Acromioclavicular joint is well aligned.                                                                " "   IMPRESSION: No evidence of fracture or dislocation involving the right  Shoulder.    Limited ultrasound of the right shoulder  -Evidence of fluid around the proximal biceps tendon sheath in both short axis and long axis without evidence of tendinosis or partial tendon rupture    R biceps tendon sheath injection- Ultrasound Guided:  The patient was informed of the risks and the benefits of the procedure and a written consent was signed.  The patient s Right shoulder was prepped with chlorhexidine in sterile fashion. 40 mg of triamcinolone suspension was drawn up into a 3 mL syringe with 2.0 mL of 1% lidocaine. Injection was performed using sterile technique.  Under ultrasound guidance a 1.5\" 25-gauge needle was used to enter the right biceps tendon sheath in an \"transverse\" fashion near the bicipital groove.  Needle placement was visualized and documented with ultrasound.  Ultrasound was necessary to ensure that steroid did not enter the tendon itself which could potentially cause tendon rupture.  Injection solution visualized within the tendon sheeth.  Images were permanently stored for the patient's record.  There were no complications. The patient tolerated the procedure well. There was no bleeding. The patient was instructed to call or go to the emergency room with any unusual pain, swelling, redness, or if otherwise concerned       Assessment/Plan:   #) Right shoulder pain  #) Biceps tendinitis    -Limited ultrasound was performed of the right shoulder and showed evidence of right biceps tendinitis which correlates with the patient's physical examination  -Following review of risks and benefits, patient elected to proceed with ultrasound-guided injection of the right biceps tendon was which was performed as above without complication  -postInjection instructions were provided  -Recommend follow-up if symptoms do not improve   -I have recommended physical therapy which was deferred  -Patient intends to " follow-up in the near future for evaluation of his hips    Patient endorsed understanding and agreement with the above plan    Marcio Gerardo MD  Primary Care Sports Medicine, [facility]    Large Joint Injection/Arthocentesis  Date/Time: 1/10/2019 2:25 PM  Performed by: Marcio Gerardo MD  Authorized by: Marcio Gerardo MD     Needle Size:  25 G  Guidance: ultrasound    Approach:  Anterior  Location:  Shoulder  Location comment:  Right Biceps Tendon    Site comment:  Right Biceps Tendon    Site comment:  Right Biceps Tendon    Site comment:  Right Biceps Tendon    Medications:  40 mg triamcinolone 40 MG/ML; 2 mL lidocaine (PF) 1 %  Outcome:  Tolerated well, no immediate complications  Procedure discussed: discussed risks, benefits, and alternatives    Consent Given by:  Patient  Timeout: timeout called immediately prior to procedure    Prep: patient was prepped and draped in usual sterile fashion     Scribed by Helga Reid ATC for Dr. Gerardo on 1/10/19 at 1:00PM, based on the provider s statements to me.

## 2019-01-10 NOTE — LETTER
1/10/2019      RE: Andrew Lockwood  [address]  [facility] [address]        Subjective:   Andrew Lockwood is a 51 year old HIV+ male who presents with concerns of chronic right shoulder pain times 1 year.  He denies any injuries.  He endorses the pain is over the anterior aspect of the shoulder.  Pain is made worse with lifting and use of the shoulder.  He endorses full range of motion and full strength.  Overall he feels his symptoms are worsening.  He has not participated in physical therapy.  Prior x-ray this year which was normal.    Background:   Date of injury: None   Duration of symptoms: 1 years  Mechanism of Injury: Insidious Onset; Unknown   Aggravating factors: Sleeping at night, laying on the right side  Relieving Factors: Rest  Prior Evaluation: None    PAST MEDICAL, SOCIAL, SURGICAL AND FAMILY HISTORY: He  has a past medical history of AIDS (H), Allergic rhinitis due to other allergen, Chronic abdominal pain, CNS toxoplasmosis (H), Diabetes type 2, controlled (H), GERD (gastroesophageal reflux disease), HIV (human immunodeficiency virus infection) (H), Periungual wart, and Sleep apnea. He also has no past medical history of Hypertension.  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  His family history includes Alzheimer Disease in his father; Diabetes in his brother, father, and paternal grandfather; Unknown/Adopted in his mother.  He reports that he has been smoking cigarettes.  He has been smoking about 0.25 packs per day. He has quit using smokeless tobacco. He reports that he does not drink alcohol or  "use drugs.    ALLERGIES: He is allergic to metformin; milk [lac bovis]; tylenol [acetaminophen]; and dulaglutide.    CURRENT MEDICATIONS: He has a current medication list which includes the following prescription(s): insulin glargine, bictegravir-emtricitabine-tenofovir, calcium carbonate, empagliflozin, gabapentin, glycerin, guaifenesin, hyoscyamine, insulin glargine, levofloxacin, lorazepam, methylprednisolone, omeprazole, ranitidine, simethicone, trimethoprim-polymyxin b, albuterol, blood glucose, blood glucose monitoring, blood glucose, chantix starting month [place], ibuprofen, insulin pen needle, magnesium oxide, montelukast, ondansetron, and pseudoephedrine.     REVIEW OF SYSTEMS: 9 point review of systems is negative except as noted above.     Exam:   /71   Ht 1.626 m (5' 4\")   Wt 77.1 kg (170 lb)   BMI 29.18 kg/m              CONSTITUTIONAL: healthy, alert and no distress  HEAD: Normocephalic.   SKIN: no suspicious lesions or rashes  GAIT: normal  NEUROLOGIC: Non-focal  PSYCHIATRIC: affect normal/bright and mentation appears normal.    MUSCULOSKELETAL:     R Shoulder: No effusion or deformity. Full and equal ROM with flexion, abduction, internal, and external rotation.   NTTP over A/C joint, clavicle, and anterior glenoid.  TTP over long head of biceps.  5/5 strength with testing of the supraspinatus, infraspinatus, teres minor, and subscapularis without discomfort. Mild impingement signs with Nears or [place] tests. Positive [place]'s. No pain with cross arm reach. Negative speed's and Yergason's.  No apprehension or sulcus sign.    Contralateral shoulder with 5/5 strength and full ROM w/o pain.      Exam: 4 views of the right shoulder dated 9/19/2018.     COMPARISON: 4/6/2015.     CLINICAL HISTORY: Right shoulder pain.     FINDINGS: Neutral, Grashey, axillary, and Y views of the right  shoulder were obtained. No evidence of fracture or dislocation  involving the right shoulder. Acromioclavicular " "joint is well aligned.                                                                   IMPRESSION: No evidence of fracture or dislocation involving the right  Shoulder.    Limited ultrasound of the right shoulder  -Evidence of fluid around the proximal biceps tendon sheath in both short axis and long axis without evidence of tendinosis or partial tendon rupture    R biceps tendon sheath injection- Ultrasound Guided:  The patient was informed of the risks and the benefits of the procedure and a written consent was signed.  The patient s Right shoulder was prepped with chlorhexidine in sterile fashion. 40 mg of triamcinolone suspension was drawn up into a 3 mL syringe with 2.0 mL of 1% lidocaine. Injection was performed using sterile technique.  Under ultrasound guidance a 1.5\" 25-gauge needle was used to enter the right biceps tendon sheath in an \"transverse\" fashion near the bicipital groove.  Needle placement was visualized and documented with ultrasound.  Ultrasound was necessary to ensure that steroid did not enter the tendon itself which could potentially cause tendon rupture.  Injection solution visualized within the tendon sheeth.  Images were permanently stored for the patient's record.  There were no complications. The patient tolerated the procedure well. There was no bleeding. The patient was instructed to call or go to the emergency room with any unusual pain, swelling, redness, or if otherwise concerned       Assessment/Plan:   #) Right shoulder pain  #) Biceps tendinitis    -Limited ultrasound was performed of the right shoulder and showed evidence of right biceps tendinitis which correlates with the patient's physical examination  -Following review of risks and benefits, patient elected to proceed with ultrasound-guided injection of the right biceps tendon was which was performed as above without complication  -postInjection instructions were provided  -Recommend follow-up if symptoms do not improve "   -I have recommended physical therapy which was deferred  -Patient intends to follow-up in the near future for evaluation of his hips    Patient endorsed understanding and agreement with the above plan    Marcio Gerardo MD  Primary Care Sports Medicine, [facility]    Large Joint Injection/Arthocentesis  Date/Time: 1/10/2019 2:25 PM  Performed by: Marcio Gerardo MD  Authorized by: Marcio Gerardo MD     Needle Size:  25 G  Guidance: ultrasound    Approach:  Anterior  Location:  Shoulder  Location comment:  Right Biceps Tendon    Site comment:  Right Biceps Tendon    Site comment:  Right Biceps Tendon    Site comment:  Right Biceps Tendon    Medications:  40 mg triamcinolone 40 MG/ML; 2 mL lidocaine (PF) 1 %  Outcome:  Tolerated well, no immediate complications  Procedure discussed: discussed risks, benefits, and alternatives    Consent Given by:  Patient  Timeout: timeout called immediately prior to procedure    Prep: patient was prepped and draped in usual sterile fashion     Scribed by Helga Reid ATC for Dr. Gerardo on 1/10/19 at 1:00PM, based on the provider s statements to me.              Marcio Gerardo MD

## 2019-01-10 NOTE — TELEPHONE ENCOUNTER
MTM referral from: Transitions of Care (recent hospital discharge or ED visit)    MTM referral outreach attempt #2 on January 10, 2019 at 11:35 AM      Outcome: Patient not reachable after several attempts, will route to MTM Pharmacist/Provider as an FYI. Thank you for the referral.    Eliza Zavaleta, MTM Coordinator    MTM Pharmacist at clinic where patient receives primary care-yes  Referred by a specialist-no, MTM at speciality clinic-NA  Patient covered for MTM-yes MJ    Blocked MTM provider schedule-no

## 2019-01-10 NOTE — NURSING NOTE
[facility] SPORTS MEDICINE  [address] [place] [id number]  Dept: [phone]  ______________________________________________________________________________    Patient: Andrew Lockwood   : 1967   MRN: [id number]   January 10, 2019    INVASIVE PROCEDURE SAFETY CHECKLIST    Date: 1/10/19   Procedure: Right biceps tendons injection with Kenalog under US Guidance  Patient Name: Andrew Lockwood  MRN: [id number]  YOB: 1967    Action: Complete sections as appropriate. Any discrepancy results in a HARD COPY until resolved.     PRE PROCEDURE:  Patient ID verified with 2 identifiers (name and  or MRN): Yes  Procedure and site verified with patient/designee (when able): Yes  Accurate consent documentation in medical record: Yes  H&P (or appropriate assessment) documented in medical record: Yes  H&P must be up to 20 days prior to procedure and updates within 24 hours of procedure as applicable: NA  Relevant diagnostic and radiology test results appropriately labeled and displayed as applicable: Yes  Procedure site(s) marked with provider initials: NA    TIMEOUT:  Time-Out performed immediately prior to starting procedure, including verbal and active participation of all team members addressing the following:Yes  * Correct patient identify  * Confirmed that the correct side and site are marked  * An accurate procedure consent form  * Agreement on the procedure to be done  * Correct patient position  * Relevant images and results are properly labeled and appropriately displayed  * The need to administer antibiotics or fluids for irrigation purposes during the procedure as applicable   * Safety precautions based on patient history or medication use    DURING PROCEDURE: Verification of correct person, site, and procedures any time the responsibility for care of the patient is transferred to another member of the care team.     Prior to injection, verified patient identity using  patient's name and date of birth.  Due to injection administration, patient instructed to remain in clinic for 15 minutes  afterwards, and to report any adverse reaction to me immediately.    Tendon injection was performed.     Drug Amount Wasted:  28 of 30mg Lidocaine wasted  Vial/Syringe: Single dose vial  Expiration Date:  6/1/20      Helga Reid ATC  January 10, 2019

## 2019-01-14 ENCOUNTER — TELEPHONE (OUTPATIENT)
Dept: GASTROENTEROLOGY | Facility: CLINIC | Age: 54
End: 2019-01-14

## 2019-01-14 NOTE — TELEPHONE ENCOUNTER
Called and left message for patient reminding him of appointment for 1/16/19 at 8:20AM. Also sent patient MyChart message due to note in chart stating does not have working phone at this time, even though did receive vmail message.

## 2019-01-16 DIAGNOSIS — M25.552 LEFT HIP PAIN: Primary | ICD-10-CM

## 2019-01-25 ENCOUNTER — TELEPHONE (OUTPATIENT)
Dept: PHARMACY | Facility: CLINIC | Age: 54
End: 2019-01-25

## 2019-01-30 ENCOUNTER — TELEPHONE (OUTPATIENT)
Dept: SURGERY | Facility: CLINIC | Age: 54
End: 2019-01-30

## 2019-01-30 NOTE — TELEPHONE ENCOUNTER
Called patient to verify appointment date, time, and location. And to move appointment time forward to 11:30, mening patient should arrive at 10:30 to to Ativan prior to exam. Patient did not answer phone call. A message was left regarding details of the future appointment. A callback number was left to follow-up.  Savannah Hagen, EMT

## 2019-01-30 NOTE — TELEPHONE ENCOUNTER
Attempted to call  at these numbers to inquire about contact information for patient. Missing or invalid numbers.

## 2019-02-02 ENCOUNTER — HOSPITAL ENCOUNTER (EMERGENCY)
Facility: CLINIC | Age: 54
Discharge: HOME | End: 2019-02-02
Attending: EMERGENCY MEDICINE | Admitting: EMERGENCY MEDICINE
Payer: COMMERCIAL

## 2019-02-02 VITALS
DIASTOLIC BLOOD PRESSURE: 78 MMHG | SYSTOLIC BLOOD PRESSURE: 133 MMHG | BODY MASS INDEX: 29.02 KG/M2 | RESPIRATION RATE: 18 BRPM | TEMPERATURE: 98 F | HEART RATE: 95 BPM | WEIGHT: 170 LBS | HEIGHT: 64 IN | OXYGEN SATURATION: 97 %

## 2019-02-02 DIAGNOSIS — L03.90 CELLULITIS, UNSPECIFIED CELLULITIS SITE: ICD-10-CM

## 2019-02-02 PROCEDURE — 99283 EMERGENCY DEPT VISIT LOW MDM: CPT | Mod: 25 | Performed by: EMERGENCY MEDICINE

## 2019-02-02 PROCEDURE — 76882 US LMTD JT/FCL EVL NVASC XTR: CPT | Mod: Z6 | Performed by: EMERGENCY MEDICINE

## 2019-02-02 PROCEDURE — 25000132 ZZH RX MED GY IP 250 OP 250 PS 637: Performed by: EMERGENCY MEDICINE

## 2019-02-02 PROCEDURE — 99284 EMERGENCY DEPT VISIT MOD MDM: CPT | Mod: 25 | Performed by: EMERGENCY MEDICINE

## 2019-02-02 PROCEDURE — 76882 US LMTD JT/FCL EVL NVASC XTR: CPT | Performed by: EMERGENCY MEDICINE

## 2019-02-02 RX ORDER — CEPHALEXIN 500 MG/1
500 CAPSULE ORAL 4 TIMES DAILY
Qty: 40 CAPSULE | Refills: 0 | Status: SHIPPED | OUTPATIENT
Start: 2019-02-02 | End: 2019-03-08

## 2019-02-02 RX ORDER — IBUPROFEN 600 MG/1
600 TABLET, FILM COATED ORAL ONCE
Status: COMPLETED | OUTPATIENT
Start: 2019-02-02 | End: 2019-02-02

## 2019-02-02 RX ADMIN — IBUPROFEN 600 MG: 600 TABLET ORAL at 14:25

## 2019-02-02 ASSESSMENT — ENCOUNTER SYMPTOMS
NAUSEA: 0
WOUND: 1
VOMITING: 0
CHILLS: 0
ABDOMINAL PAIN: 0
DIARRHEA: 0
FEVER: 0

## 2019-02-02 ASSESSMENT — MIFFLIN-ST. JEOR: SCORE: 1517.11

## 2019-02-02 NOTE — DISCHARGE INSTRUCTIONS
Please make an appointment to follow [facility] with [facility] (phone: [phone] in 5-7 days if not improving.    If Andrew has discomfort from fever or other pain, he can have:      NOTE: If your acetaminophen (Tylenol) came with a dropper marked with 0.4 and 0.8 ml, call us ([phone]) or check with your doctor about the dose before using it.     AND/OR      Ibuprofen (Advil, Motrin) every 6 hours as needed. His/her dose is:    1 tab of the 600 mg prescription tabs                                                                  (60-80 kg/132-176 lb)

## 2019-02-02 NOTE — ED PROVIDER NOTES
[facility] EMERGENCY DEPARTMENT ([facility])  2/02/19   History     Chief Complaint   Patient presents with     Leg Pain     insect bite and toe issue     The history is provided by the patient.     Andrew Lockwood is a 53 year old male with a medical history significant for HIV who presents to the Emergency Department for evaluation of an abscess to his left posterior thigh.  The patient reports that he first noticed the abscess 2 days ago.  He denies any drainage.  He also denies any fevers, chills, nausea, vomiting, abdominal pain or diarrhea.  The patient here is also complaining of an infected hangnail on his right great toe.    I have reviewed the Medications, Allergies, Past Medical and Surgical History, and Social History in the [employer] system.    Past Medical History:   Diagnosis Date     AIDS (H)      Allergic rhinitis due to other allergen     DNS     Chronic abdominal pain      CNS toxoplasmosis (H)      Diabetes type 2, controlled (H)      GERD (gastroesophageal reflux disease)      HIV (human immunodeficiency virus infection) (H)      Periungual wart      Sleep apnea     doesn't use CPAP       Past Surgical History:   Procedure Laterality Date     C NONSPECIFIC PROCEDURE      right forearm fracture     COLONOSCOPY Left 1/22/2016    Procedure: COMBINED COLONOSCOPY, SINGLE OR MULTIPLE BIOPSY/POLYPECTOMY BY BIOPSY;  Surgeon: Clark Saini MD;  Location: [facility] GI     HC EXPLORE UNDESC TESTIS,INGUIN/SCROTAL       LAPAROSCOPIC APPENDECTOMY N/A 1/31/2018    Procedure: LAPAROSCOPIC APPENDECTOMY;  LAPAROSCOPIC APPENDECTOMY;  Surgeon: Dawn Holt MD;  Location: [facility] OR     LAPAROSCOPY DIAGNOSTIC (GENERAL) N/A 7/26/2016    Procedure: LAPAROSCOPY DIAGNOSTIC (GENERAL);  Surgeon: Susannah Arriaga MD;  Location: [facility] OR     LAPAROSCOPY DIAGNOSTIC (GENERAL) N/A 4/16/2018    Procedure: LAPAROSCOPY DIAGNOSTIC (GENERAL);  Diagnostic laparoscopy and lysis of adhesions;  Surgeon: Prince Dowling  MD Sky;  Location: [facility] OR     OPTICAL TRACKING SYSTEM CRANIOTOMY, EXCISE TUMOR, COMBINED Left 4/10/2015    Procedure: COMBINED OPTICAL TRACKING SYSTEM CRANIOTOMY, EXCISE TUMOR;  Surgeon: Mirlande Colmenares MD;  Location: [facility] OR     REPAIR GAMEKEEPER'S THUMB Right 2016    Procedure: REPAIR LIGAMENT ULNAR COLLATERAL THUMB (GAMEKEEPER'S);  Surgeon: Evin Zamorano MD;  Location: [facility] OR       Family History   Problem Relation Age of Onset     Diabetes Brother      Diabetes Father      Alzheimer Disease Father      Unknown/Adopted Mother      Diabetes Paternal Grandfather      Cancer No family hx of         no skin cancer     Skin Cancer No family hx of         no famiy hx of skin cancer     Glaucoma No family hx of      Macular Degeneration No family hx of        Social History     Tobacco Use     Smoking status: Current Some Day Smoker     Packs/day: 0.25     Types: Cigarettes     Last attempt to quit: 10/28/2016     Years since quittin.2     Smokeless tobacco: Former User   Substance Use Topics     Alcohol use: No     Alcohol/week: 0.0 oz     Comment: Last etoh in        Current Facility-Administered Medications   Medication     lidocaine (PF) (XYLOCAINE) 1 % injection 2 mL     triamcinolone (KENALOG-40) injection 40 mg     Current Outpatient Medications   Medication     albuterol (PROAIR HFA/PROVENTIL HFA/VENTOLIN HFA) 108 (90 Base) MCG/ACT inhaler     BASAGLAR 100 UNIT/ML injection     Bictegravir-Emtricitab-Tenofov -25 MG TABS     blood glucose (NO BRAND SPECIFIED) lancets standard     blood glucose monitoring (NO BRAND SPECIFIED) meter device kit     blood glucose monitoring (NO BRAND SPECIFIED) test strip     calcium carbonate (TUMS) 500 MG chewable tablet     CHANTIX STARTING MONTH OLIVERIO 0.5 MG X 11 & 1 MG X 42 tablet     empagliflozin (JARDIANCE) 10 MG TABS tablet     gabapentin (NEURONTIN) 100 MG capsule     glycerin, adult, 2 g SUPP Suppository     guaiFENesin (ROBITUSSIN) 20 mg/mL  "SOLN solution     hyoscyamine 0.125 MG TBDP     ibuprofen (ADVIL/MOTRIN) 200 MG tablet     insulin glargine (LANTUS SOLOSTAR) 100 UNIT/ML pen     insulin pen needle (B-D U/F) 31G X 8 MM     LORazepam (ATIVAN) 1 MG tablet     magnesium oxide (MAG-OX) 400 MG tablet     methylPREDNISolone (MEDROL DOSEPAK) 4 MG tablet     montelukast (SINGULAIR) 10 MG tablet     omeprazole (PRILOSEC) 20 MG CR capsule     ondansetron (ZOFRAN-ODT) 4 MG ODT tab     pseudoePHEDrine (SUDAFED) 30 MG tablet     ranitidine (ZANTAC) 150 MG tablet     simethicone (MYLICON) 80 MG chewable tablet     trimethoprim-polymyxin b (POLYTRIM) 93036-4.1 UNIT/ML-% ophthalmic solution        Allergies   Allergen Reactions     Metformin      Abdominal pain     Milk [Lac Bovis] Hives     Tylenol [Acetaminophen] Itching     Dulaglutide Rash     '    Review of Systems   Constitutional: Negative for chills and fever.   Gastrointestinal: Negative for abdominal pain, diarrhea, nausea and vomiting.   Skin: Positive for wound (left posterior thigh; abscess).        Positive for pain in nail of right great toe   All other systems reviewed and are negative.      Physical Exam   BP: 133/78  Pulse: 104  Heart Rate: 104  Temp: 98  F (36.7  C)  Resp: 20  Height: 162.6 cm (5' 4\")  Weight: 77.1 kg (170 lb)  SpO2: 97 %      Physical Exam  Physical Exam   Constitutional: oriented to person, place, and time. appears well-developed and well-nourished.   HENT:   Head: Normocephalic and atraumatic.   Neck: Normal range of motion.   Pulmonary/Chest: Effort normal. No respiratory distress.   Cardiac: No murmurs, rubs, gallops. RRR.  Abdominal: Abdomen soft, nontender, nondistended. No rebound tenderness.  MSK: Long bones without deformity or evidence of trauma.  Right toe with hangnail, no erythema, swelling or discharge.  No evidence of infection of the right toe.  Left posterior thigh with area of erythema with some induration, old evidence of dried pus, no active " discharge.  Neurological: alert and oriented to person, place, and time.   Skin: Skin is warm and dry.   Psychiatric:  normal mood and affect.  behavior is normal. Thought content normal.     ED Course   2:02 PM  The patient was seen and examined by Venu Wolff MD in [facility].        Procedures    Labs Ordered and Resulted from Time of ED Arrival Up to the Time of Departure from the ED - No data to display         Assessments & Plan (with Medical Decision Making)   MDM  Patient presenting with cellulitis of the left thigh.  There is no evidence of deep abscess or drainage that I could I&D.  Patient does have surrounding cellulitis will place the patient on antibiotics.  He does have a hangnail which does not appear to be infectious.  The patient will be placed on Keflex, he will follow-up with primary care provider next week.  Patient appears well and nontoxic.  Vitals show mild tachycardia however the patient is afebrile and appears well.    I have reviewed the nursing notes.    I have reviewed the findings, diagnosis, plan and need for follow up with the patient.       Medication List      ASK your doctor about these medications    * azithromycin 250 MG tablet  Commonly known as:  ZITHROMAX Z-OLIVERIO  Two tablets on the first day, then one tablet daily for the next 4 days  Ask about: Should I take this medication?     * azithromycin 250 MG tablet  Commonly known as:  ZITHROMAX Z-OLIVERIO  Two tablets on the first day, then one tablet daily for the next 4 days  Ask about: Should I take this medication?     cephALEXin 500 MG capsule  Commonly known as:  KEFLEX  500 mg, Oral, 4 TIMES DAILY  Ask about: Should I take this medication?     * doxycycline hyclate 100 MG tablet  Commonly known as:  VIBRA-TABS  100 mg, Oral, 2 TIMES DAILY  Ask about: Should I take this medication?     * doxycycline hyclate 100 MG capsule  Commonly known as:  VIBRAMYCIN  100 mg, Oral, 2 TIMES DAILY  Ask about: Should I take this medication?      Zzrtbbk-Szouu-Bbrlhfjt-TenofAF 143-295-873-10 MG Tabs per tablet  Commonly known as:  GENVOYA  1 tablet, Oral, DAILY  Ask about: Should I take this medication?     hydrOXYzine 25 MG tablet  Commonly known as:  ATARAX  25-50 mg, Oral, EVERY 6 HOURS PRN  Ask about: Should I take this medication?     ibuprofen 200 MG tablet  Commonly known as:  ADVIL/MOTRIN  200-400 mg, Oral, EVERY 6 HOURS PRN  Ask about: Should I take this medication?     levofloxacin 750 MG tablet  Commonly known as:  LEVAQUIN  750 mg, Oral, DAILY  Ask about: Should I take this medication?     mupirocin 2 % external ointment  Commonly known as:  BACTROBAN  Topical, 3 TIMES DAILY  Ask about: Should I take this medication?     naproxen 500 MG tablet  Commonly known as:  NAPROSYN  500 mg, Oral, 2 TIMES DAILY WITH MEALS  Ask about: Should I take this medication?     ondansetron 4 MG ODT tab  Commonly known as:  ZOFRAN ODT  4 mg, Oral, EVERY 8 HOURS PRN  Ask about: Should I take this medication?     rifaximin 550 MG Tabs tablet  Commonly known as:  XIFAXAN  550 mg, Oral, 3 TIMES DAILY  Ask about: Should I take this medication?     * traMADol 50 MG tablet  Commonly known as:  ULTRAM  50 mg, Oral, EVERY 6 HOURS PRN  Ask about: Should I take this medication?     * traMADol 50 MG tablet  Commonly known as:  ULTRAM  50 mg, Oral, EVERY 6 HOURS PRN  Ask about: Should I take this medication?         * This list has 6 medication(s) that are the same as other medications prescribed for you. Read the directions carefully, and ask your doctor or other care provider to review them with you.                Final diagnoses:   Cellulitis, unspecified cellulitis site     I, Yunior Mora, am serving as a trained medical scribe to document services personally performed by Venu Wolff MD, based on the provider's statements to me.   IVenu MD, was physically present and have reviewed and verified the accuracy of this note documented by Yunior  Morgan.    2/2/2019   [facility], [place], EMERGENCY DEPARTMENT     Venu Wolff MD  02/02/19 1429

## 2019-02-02 NOTE — ED AVS SNAPSHOT
[facility], [place], Emergency Department  [address] [place] [id number]  Phone:  [phone]                                    Andrew Lockwood   MRN: [id number]    Department:  [facility], Emergency Department   Date of Visit:  2/2/2019           After Visit Summary Signature Page    I have received my discharge instructions, and my questions have been answered. I have discussed any challenges I see with this plan with the nurse or doctor.    ..........................................................................................................................................  Patient/Patient Representative Signature      ..........................................................................................................................................  Patient Representative Print Name and Relationship to Patient    ..................................................               ................................................  Date                                   Time    ..........................................................................................................................................  Reviewed by Signature/Title    ...................................................              ..............................................  Date                                               Time          22EPIC Rev 08/18

## 2019-03-08 ENCOUNTER — OFFICE VISIT (OUTPATIENT)
Dept: INTERNAL MEDICINE | Facility: CLINIC | Age: 54
End: 2019-03-08
Payer: COMMERCIAL

## 2019-03-08 VITALS
SYSTOLIC BLOOD PRESSURE: 116 MMHG | TEMPERATURE: 98.3 F | RESPIRATION RATE: 18 BRPM | BODY MASS INDEX: 27.67 KG/M2 | WEIGHT: 161.2 LBS | DIASTOLIC BLOOD PRESSURE: 77 MMHG | HEART RATE: 105 BPM

## 2019-03-08 DIAGNOSIS — B35.3 TINEA PEDIS OF BOTH FEET: ICD-10-CM

## 2019-03-08 DIAGNOSIS — L03.012 ACUTE PARONYCHIA OF FINGER OF LEFT HAND: Primary | ICD-10-CM

## 2019-03-08 RX ORDER — PRENATAL VIT 91/IRON/FOLIC/DHA 28-975-200
COMBINATION PACKAGE (EA) ORAL 2 TIMES DAILY
Qty: 42 G | Refills: 1 | Status: SHIPPED | OUTPATIENT
Start: 2019-03-08 | End: 2019-04-15

## 2019-03-08 RX ORDER — DOXYCYCLINE 100 MG/1
100 CAPSULE ORAL 2 TIMES DAILY
Qty: 14 CAPSULE | Refills: 0 | Status: SHIPPED | OUTPATIENT
Start: 2019-03-08 | End: 2019-04-15

## 2019-03-08 ASSESSMENT — PAIN SCALES - GENERAL: PAINLEVEL: WORST PAIN (10)

## 2019-03-08 NOTE — NURSING NOTE
Chief Complaint   Patient presents with     Infection     Patient is here for possible finger and toes infection x 4 days       Dante Wright CMA (AAMA) at 7:45 AM on 3/8/2019

## 2019-03-08 NOTE — PROGRESS NOTES
"      HPI:       Andrew Lockwood is a 53 year old male with PMHx of diabetes type 2 and HIV who presents for evaluation of infection on hands and feet.    He states that he started having pain in his toes a few weeks ago, but it was tolerable. Suddenly 5 days ago he had worsening, burning pain in all toes and some fingers. At the same time, his toes started peeling with redness and swelling around the nail beds. Also some small areas of erosion or break in skin. The 5th digit on his left hand also started draining pus. He was soaking them in warm water, which helped a little. Says he has not picked at the nails. Denies fever, chills, and itching.    He reports recently not being able to \"keep up with medications\" due to homelessness and cost concerns (he has gotten help and now has medications). For the last few months, he would take his tablets as prescribed for 3-4 days and then skip several days. He consistently took the daily lantus injection each morning, does not check BG.    Problem, Medication and Allergy Lists were reviewed and are current.  Patient Active Problem List    Diagnosis Date Noted     Pneumonia 01/06/2019     Priority: Medium     Adynamic ileus (H) 09/10/2018     Priority: Medium     Horseshoe tear of retina of left eye without detachment 03/15/2018     Priority: Medium     Otitis media 03/07/2018     Priority: Medium     Appendicitis 02/11/2018     Priority: Medium     Abdominal abscess 02/04/2018     Priority: Medium     S/P appendectomy 02/01/2018     Priority: Medium     Acute appendicitis with localized peritonitis 01/31/2018     Priority: Medium     Abdominal pain 11/07/2017     Priority: Medium     Panic disorder 06/22/2017     Priority: Medium     Generalized anxiety disorder 06/22/2017     Priority: Medium     Major depressive disorder, single episode, unspecified 06/22/2017     Priority: Medium     Pain of right thumb 03/20/2017     Priority: Medium     Rupture of ulnar collateral " ligament of right thumb 03/20/2017     Priority: Medium     Aftercare following surgery of the musculoskeletal system 03/20/2017     Priority: Medium     Preventative health care 01/17/2017     Priority: Medium     Erectile dysfunction, unspecified erectile dysfunction type 11/19/2016     Priority: Medium     Insomnia, unspecified type 11/19/2016     Priority: Medium     Herpes zoster 09/23/2016     Priority: Medium     Periungual wart 08/31/2016     Priority: Medium     Slow transit constipation 08/26/2016     Priority: Medium     SBO (small bowel obstruction) (H) 07/25/2016     Priority: Medium     Small bowel obstruction (H) 07/21/2016     Priority: Medium     Toxoplasmosis 05/09/2016     Priority: Medium     Bowel obstruction (H) 01/25/2016     Priority: Medium     Thrush 01/07/2016     Priority: Medium     Insomnia, unspecified insomnia 01/07/2016     Priority: Medium     Non-intractable vomiting with nausea, vomiting of unspecified type 01/05/2016     Priority: Medium     Constipation 01/04/2016     Priority: Medium     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     Priority: Medium     Headache 12/17/2015     Priority: Medium     Type 2 diabetes mellitus (H) 12/17/2015     Priority: Medium     Shoulder joint pain, unspecified laterality 12/01/2015     Priority: Medium     Human immunodeficiency virus I infection (H) 11/20/2015     Priority: Medium     Folliculitis 11/20/2015     Priority: Medium     Prurigo nodularis 11/20/2015     Priority: Medium     Epidermal cyst 11/20/2015     Priority: Medium     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 Will be followed up as an outpatient, needs repeat CT in 6-12 months.        Brain lesion 04/09/2015     Priority: Medium     Gastroesophageal reflux disease 12/19/2011     Priority: Medium     Aphthous ulcer 12/19/2011     Priority: Medium      Allergic rhinitis due to other allergen 10/18/2005     Priority: Medium     DNS     ,     Current Outpatient Medications   Medication Sig Dispense Refill     BASAGLAR 100 UNIT/ML injection Inject 20 Units Subcutaneous daily 15 mL 0     Bictegravir-Emtricitab-Tenofov -25 MG TABS Take 1 tablet by mouth daily 30 tablet 11     blood glucose (NO BRAND SPECIFIED) lancets standard Use to test blood sugar four times daily or as directed. 100 Box 5     blood glucose monitoring (NO BRAND SPECIFIED) meter device kit Use to test blood sugar 4 times daily or as directed. 1 kit 0     blood glucose monitoring (NO BRAND SPECIFIED) test strip 1 strip by In Vitro route 4 times daily (before meals and nightly) Use to test blood sugars 4 times daily or as directed 100 strip 11     calcium carbonate (TUMS) 500 MG chewable tablet Take 2 tablets (1,000 mg) by mouth 3 times daily as needed for heartburn 150 tablet 3     doxycycline hyclate (VIBRAMYCIN) 100 MG capsule Take 1 capsule (100 mg) by mouth 2 times daily 14 capsule 0     empagliflozin (JARDIANCE) 10 MG TABS tablet Take 1 tablet by mouth daily 90 tablet 3     glycerin, adult, 2 g SUPP Suppository Place 1 suppository rectally daily as needed for constipation 5 suppository 0     hyoscyamine 0.125 MG TBDP Take 2 tablets (0.25 mg) by mouth 4 times daily as needed for cramping 60 tablet 0     ibuprofen (ADVIL/MOTRIN) 200 MG tablet Take 2 tablets (400 mg) by mouth every 8 hours as needed for pain 60 tablet 0     insulin glargine (LANTUS SOLOSTAR) 100 UNIT/ML pen Inject 20 Units Subcutaneous every morning 15 mL 0     insulin pen needle (B-D U/F) 31G X 8 MM Use 1 pen needles daily or as directed. 100 each 0     LORazepam (ATIVAN) 1 MG tablet Take 1 tablet (1 mg) by mouth every 6 hours as needed for anxiety 1 tablet 0     magnesium oxide (MAG-OX) 400 MG tablet Take 1 tablet (400 mg) by mouth daily 30 tablet 0     methylPREDNISolone (MEDROL DOSEPAK) 4 MG tablet Follow package  instructions 21 tablet 0     montelukast (SINGULAIR) 10 MG tablet Take 1 tablet (10 mg) by mouth At Bedtime 30 tablet 1     omeprazole (PRILOSEC) 20 MG CR capsule Take 1 capsule (20 mg) by mouth 2 times daily 180 capsule 3     ranitidine (ZANTAC) 150 MG tablet Take 1 tablet (150 mg) by mouth 2 times daily 60 tablet 3     terbinafine (LAMISIL) 1 % external cream Apply topically 2 times daily 42 g 1     albuterol (PROAIR HFA/PROVENTIL HFA/VENTOLIN HFA) 108 (90 Base) MCG/ACT inhaler Inhale 2 puffs into the lungs every 6 hours as needed for shortness of breath / dyspnea or wheezing (Patient not taking: Reported on 11/13/2018) 1 Inhaler 1     CHANTIX STARTING MONTH OLIVERIO 0.5 MG X 11 & 1 MG X 42 tablet Dispense starter pack as written (Patient not taking: Reported on 11/13/2018) 1 tablet 0     gabapentin (NEURONTIN) 100 MG capsule Take 2 capsules (200 mg) by mouth 3 times daily (Patient not taking: Reported on 3/8/2019) 90 capsule 0     simethicone (MYLICON) 80 MG chewable tablet Take 1 tablet (80 mg) by mouth 4 times daily (Patient not taking: Reported on 3/8/2019) 30 tablet 0     trimethoprim-polymyxin b (POLYTRIM) 43442-4.1 UNIT/ML-% ophthalmic solution Place 1 drop into the right eye every 3 hours . Use while awake. (Patient not taking: Reported on 3/8/2019) 1 Bottle 0   ,     Allergies   Allergen Reactions     Metformin      Abdominal pain     Milk [Lac Bovis] Hives     Tylenol [Acetaminophen] Itching     Dulaglutide Rash     Patient is an established patient of this clinic.         Review of Systems:   Complete 10-point ROS was negative unless otherwise noted.          Physical Exam:   /77   Pulse 105   Temp 98.3  F (36.8  C) (Oral)   Resp 18   Wt 73.1 kg (161 lb 3.2 oz)   BMI 27.67 kg/m    Body mass index is 27.67 kg/m .  Vitals were reviewed        GENERAL APPEARANCE: healthy, alert and no distress     CV: Pulses 2+ at PT and DP bilaterally     MS: extremities normal- no gross deformities noted.          SKIN: see images below. Areas of skin peeling. 5th digit on left hand with purulent drainage.     NEURO: Sensory exam grossly normal on bilateral lower extremities and feet, mentation intact and speech normal     PSYCH: mentation appears normal and appropriate affect               Assessment and Plan     Andrew was seen today for infection on hands and feet.    Acute paronychia of finger of left hand  Area on 5th left digit with active purulent drainage, mild-moderate paronychia. Will do course of doxycycline.  -     doxycycline hyclate (VIBRAMYCIN) 100 MG capsule; Take 1 capsule (100 mg) by mouth 2 times daily    Tinea pedis of both feet  Acute infection involving all toes bilaterally, will give topical terbinafine. Patient is diabetic, discussed watching areas of broken skin around toes for signs of infection. Refer to podiatry. Patient will continue to soak in warm water.  -     PODIATRY/FOOT & ANKLE SURGERY REFERRAL  -     terbinafine (LAMISIL) 1 % external cream; Apply topically 2 times daily      Radha Paulino, MS4    Physician Attestation   I, Caitie Lamb, was present with the medical student who participated in the service and in the documentation of the note.  I have verified the history and personally performed the physical exam and medical decision making.  I agree with the assessment and plan of care as documented in the note.      Items personally reviewed: vitals and labs.    Caitie Lamb MD

## 2019-04-02 NOTE — TELEPHONE ENCOUNTER
RECORDS RECEIVED FROM: Tinea pedis of both feet, records in Epic, appt per Frances at [facility]   DATE RECEIVED: 04/02/19    NOTES STATUS DETAILS   OFFICE NOTE from referring provider Internal Dr. Lamb 3/8/19   OFFICE NOTE from other specialist N/A    DISCHARGE SUMMARY from hospital N/A    DISCHARGE REPORT from the ER Internal 2/2/19   OPERATIVE REPORT N/A    MEDICATION LIST Internal    IMPLANT RECORD/STICKER N/A    LABS     CBC/DIFF Internal 1/7/19   CULTURES N/A    INJECTIONS DONE IN RADIOLOGY N/A    MRI N/A    CT SCAN N/A    XRAYS (IMAGES & REPORTS) N/A    TUMOR     PATHOLOGY  Slides & report N/A

## 2019-04-07 ENCOUNTER — APPOINTMENT (OUTPATIENT)
Dept: GENERAL RADIOLOGY | Facility: CLINIC | Age: 54
End: 2019-04-07
Attending: EMERGENCY MEDICINE
Payer: COMMERCIAL

## 2019-04-07 ENCOUNTER — HOSPITAL ENCOUNTER (EMERGENCY)
Facility: CLINIC | Age: 54
Discharge: HOME | End: 2019-04-07
Attending: EMERGENCY MEDICINE | Admitting: EMERGENCY MEDICINE
Payer: COMMERCIAL

## 2019-04-07 VITALS
WEIGHT: 160 LBS | SYSTOLIC BLOOD PRESSURE: 115 MMHG | BODY MASS INDEX: 27.31 KG/M2 | TEMPERATURE: 97.7 F | HEIGHT: 64 IN | HEART RATE: 80 BPM | DIASTOLIC BLOOD PRESSURE: 70 MMHG | RESPIRATION RATE: 18 BRPM | OXYGEN SATURATION: 100 %

## 2019-04-07 DIAGNOSIS — K29.70 GASTRITIS WITHOUT BLEEDING, UNSPECIFIED CHRONICITY, UNSPECIFIED GASTRITIS TYPE: ICD-10-CM

## 2019-04-07 DIAGNOSIS — R11.2 NON-INTRACTABLE VOMITING WITH NAUSEA, UNSPECIFIED VOMITING TYPE: ICD-10-CM

## 2019-04-07 DIAGNOSIS — R10.13 ABDOMINAL PAIN, EPIGASTRIC: ICD-10-CM

## 2019-04-07 LAB
ALBUMIN SERPL-MCNC: 3.8 G/DL (ref 3.4–5)
ALP SERPL-CCNC: 126 U/L (ref 40–150)
ALT SERPL W P-5'-P-CCNC: 29 U/L (ref 0–70)
ANION GAP SERPL CALCULATED.3IONS-SCNC: 11 MMOL/L (ref 3–14)
AST SERPL W P-5'-P-CCNC: 28 U/L (ref 0–45)
BASOPHILS # BLD AUTO: 0.1 10E9/L (ref 0–0.2)
BASOPHILS NFR BLD AUTO: 0.6 %
BILIRUB SERPL-MCNC: 0.9 MG/DL (ref 0.2–1.3)
BUN SERPL-MCNC: 12 MG/DL (ref 7–30)
CALCIUM SERPL-MCNC: 9.6 MG/DL (ref 8.5–10.1)
CHLORIDE SERPL-SCNC: 99 MMOL/L (ref 94–109)
CO2 SERPL-SCNC: 25 MMOL/L (ref 20–32)
CREAT SERPL-MCNC: 0.72 MG/DL (ref 0.66–1.25)
DIFFERENTIAL METHOD BLD: ABNORMAL
EOSINOPHIL # BLD AUTO: 0.2 10E9/L (ref 0–0.7)
EOSINOPHIL NFR BLD AUTO: 1.6 %
ERYTHROCYTE [DISTWIDTH] IN BLOOD BY AUTOMATED COUNT: 12.8 % (ref 10–15)
GFR SERPL CREATININE-BSD FRML MDRD: >90 ML/MIN/{1.73_M2}
GLUCOSE BLDC GLUCOMTR-MCNC: 271 MG/DL (ref 70–99)
GLUCOSE SERPL-MCNC: 237 MG/DL (ref 70–99)
HCT VFR BLD AUTO: 45.2 % (ref 40–53)
HGB BLD-MCNC: 15.4 G/DL (ref 13.3–17.7)
IMM GRANULOCYTES # BLD: 0.1 10E9/L (ref 0–0.4)
IMM GRANULOCYTES NFR BLD: 1.2 %
LIPASE SERPL-CCNC: 96 U/L (ref 73–393)
LYMPHOCYTES # BLD AUTO: 2 10E9/L (ref 0.8–5.3)
LYMPHOCYTES NFR BLD AUTO: 17.7 %
MCH RBC QN AUTO: 30.7 PG (ref 26.5–33)
MCHC RBC AUTO-ENTMCNC: 34.1 G/DL (ref 31.5–36.5)
MCV RBC AUTO: 90 FL (ref 78–100)
MONOCYTES # BLD AUTO: 0.6 10E9/L (ref 0–1.3)
MONOCYTES NFR BLD AUTO: 5.3 %
NEUTROPHILS # BLD AUTO: 8.3 10E9/L (ref 1.6–8.3)
NEUTROPHILS NFR BLD AUTO: 73.6 %
NRBC # BLD AUTO: 0 10*3/UL
NRBC BLD AUTO-RTO: 0 /100
PLATELET # BLD AUTO: 462 10E9/L (ref 150–450)
POTASSIUM SERPL-SCNC: 3.2 MMOL/L (ref 3.4–5.3)
PROT SERPL-MCNC: 8 G/DL (ref 6.8–8.8)
RBC # BLD AUTO: 5.02 10E12/L (ref 4.4–5.9)
SODIUM SERPL-SCNC: 135 MMOL/L (ref 133–144)
WBC # BLD AUTO: 11.2 10E9/L (ref 4–11)

## 2019-04-07 PROCEDURE — 96361 HYDRATE IV INFUSION ADD-ON: CPT | Performed by: EMERGENCY MEDICINE

## 2019-04-07 PROCEDURE — 25000132 ZZH RX MED GY IP 250 OP 250 PS 637: Performed by: EMERGENCY MEDICINE

## 2019-04-07 PROCEDURE — 99284 EMERGENCY DEPT VISIT MOD MDM: CPT | Mod: 25 | Performed by: EMERGENCY MEDICINE

## 2019-04-07 PROCEDURE — 83690 ASSAY OF LIPASE: CPT | Performed by: EMERGENCY MEDICINE

## 2019-04-07 PROCEDURE — 85025 COMPLETE CBC W/AUTO DIFF WBC: CPT | Performed by: EMERGENCY MEDICINE

## 2019-04-07 PROCEDURE — 25000128 H RX IP 250 OP 636: Performed by: EMERGENCY MEDICINE

## 2019-04-07 PROCEDURE — 80053 COMPREHEN METABOLIC PANEL: CPT | Performed by: EMERGENCY MEDICINE

## 2019-04-07 PROCEDURE — 00000146 ZZHCL STATISTIC GLUCOSE BY METER IP

## 2019-04-07 PROCEDURE — 96374 THER/PROPH/DIAG INJ IV PUSH: CPT | Performed by: EMERGENCY MEDICINE

## 2019-04-07 PROCEDURE — 74019 RADEX ABDOMEN 2 VIEWS: CPT

## 2019-04-07 PROCEDURE — 25000125 ZZHC RX 250: Performed by: EMERGENCY MEDICINE

## 2019-04-07 PROCEDURE — 99285 EMERGENCY DEPT VISIT HI MDM: CPT | Mod: 25 | Performed by: EMERGENCY MEDICINE

## 2019-04-07 PROCEDURE — 93005 ELECTROCARDIOGRAM TRACING: CPT | Performed by: EMERGENCY MEDICINE

## 2019-04-07 PROCEDURE — 93010 ELECTROCARDIOGRAM REPORT: CPT | Mod: Z6 | Performed by: EMERGENCY MEDICINE

## 2019-04-07 RX ORDER — ONDANSETRON 4 MG/1
4 TABLET, ORALLY DISINTEGRATING ORAL EVERY 8 HOURS PRN
Qty: 10 TABLET | Refills: 0 | Status: SHIPPED | OUTPATIENT
Start: 2019-04-07 | End: 2019-06-07

## 2019-04-07 RX ORDER — METOCLOPRAMIDE HYDROCHLORIDE 5 MG/ML
5 INJECTION INTRAMUSCULAR; INTRAVENOUS ONCE
Status: COMPLETED | OUTPATIENT
Start: 2019-04-07 | End: 2019-04-07

## 2019-04-07 RX ADMIN — SODIUM CHLORIDE 1000 ML: 9 INJECTION, SOLUTION INTRAVENOUS at 10:27

## 2019-04-07 RX ADMIN — LIDOCAINE HYDROCHLORIDE 30 ML: 20 SOLUTION ORAL; TOPICAL at 13:27

## 2019-04-07 RX ADMIN — METOCLOPRAMIDE 5 MG: 5 INJECTION, SOLUTION INTRAMUSCULAR; INTRAVENOUS at 10:32

## 2019-04-07 ASSESSMENT — ENCOUNTER SYMPTOMS
COLOR CHANGE: 0
ARTHRALGIAS: 0
NECK STIFFNESS: 0
SHORTNESS OF BREATH: 0
CONSTIPATION: 0
HEADACHES: 0
ABDOMINAL PAIN: 1
VOMITING: 1
EYE REDNESS: 0
DIFFICULTY URINATING: 0
DIARRHEA: 0
ABDOMINAL DISTENTION: 0
CONFUSION: 0
FEVER: 0
NAUSEA: 1

## 2019-04-07 ASSESSMENT — MIFFLIN-ST. JEOR: SCORE: 1471.76

## 2019-04-07 NOTE — ED PROVIDER NOTES
"  History     Chief Complaint   Patient presents with     Abdominal Pain     Nausea & Vomiting     HPI  Andrew Lockwood is a 53 year old male with a history of HIV, recurrent partial SBO, DM II, and GERD who presents for evaluation of abdominal pain and nausea. Patient reports eating some sweet corn last night; he states he's \"gotten sick\" with sweet corn in the past, but had \"forgot about it\" yesterday. He reports that this morning, around 4 AM, he developed abdominal pain along the midline of his abdomen, nausea, and vomiting. Patient states his pain is nonradiating, \"feels like gas or indigestion\", and his vomitus \"looks like sweet corn\". He states he had had a normal bowel movement yesterday around midnight, though reports he has not been passing gas. Denies abdominal distention and does not feel this is similar to prior SBOs. Patient reports taking his prescribed omeprazole last night and denies recent missed doses. He states he has not checked his sugars today.    Past Medical History:   Diagnosis Date     AIDS (H)      Allergic rhinitis due to other allergen     DNS     Chronic abdominal pain      CNS toxoplasmosis (H)      Diabetes type 2, controlled (H)      GERD (gastroesophageal reflux disease)      HIV (human immunodeficiency virus infection) (H)      Periungual wart      Sleep apnea     doesn't use CPAP       Past Surgical History:   Procedure Laterality Date     C NONSPECIFIC PROCEDURE      right forearm fracture     COLONOSCOPY Left 1/22/2016    Procedure: COMBINED COLONOSCOPY, SINGLE OR MULTIPLE BIOPSY/POLYPECTOMY BY BIOPSY;  Surgeon: Clark Saini MD;  [facility]     HC EXPLORE UNDESC TESTIS,INGUIN/SCROTAL       LAPAROSCOPIC APPENDECTOMY N/A 1/31/2018    Procedure: LAPAROSCOPIC APPENDECTOMY;  LAPAROSCOPIC APPENDECTOMY;  Surgeon: Dawn Holt MD;  Location[facility]     LAPAROSCOPY DIAGNOSTIC (GENERAL) N/A 7/26/2016    Procedure: LAPAROSCOPY DIAGNOSTIC (GENERAL);  Surgeon: " Susannah Arriaga MD;  Location: [facility]     LAPAROSCOPY DIAGNOSTIC (GENERAL) N/A 2018    Procedure: LAPAROSCOPY DIAGNOSTIC (GENERAL);  Diagnostic laparoscopy and lysis of adhesions;  Surgeon: Prince Dowling MD;  Location: [facility]     OPTICAL TRACKING SYSTEM CRANIOTOMY, EXCISE TUMOR, COMBINED Left 4/10/2015    Procedure: COMBINED OPTICAL TRACKING SYSTEM CRANIOTOMY, EXCISE TUMOR;  Surgeon: Mirlande Colmenares MD;  Location: [facility]     REPAIR GAMEKEEPER'S THUMB Right 2016    Procedure: REPAIR LIGAMENT ULNAR COLLATERAL THUMB (GAMEKEEPER'S);  Surgeon: Evin Zamorano MD;  Location: [facility]       Family History   Problem Relation Age of Onset     Diabetes Brother      Diabetes Father      Alzheimer Disease Father      Unknown/Adopted Mother      Diabetes Paternal Grandfather      Cancer No family hx of         no skin cancer     Skin Cancer No family hx of         no famiy hx of skin cancer     Glaucoma No family hx of      Macular Degeneration No family hx of        Social History     Tobacco Use     Smoking status: Current Some Day Smoker     Packs/day: 0.25     Types: Cigarettes     Last attempt to quit: 10/28/2016     Years since quittin.4     Smokeless tobacco: Former User   Substance Use Topics     Alcohol use: No     Alcohol/week: 0.0 oz     Comment: Last etoh in        No current facility-administered medications for this encounter.      Current Outpatient Medications   Medication     ondansetron (ZOFRAN ODT) 4 MG ODT tab     albuterol (PROAIR HFA/PROVENTIL HFA/VENTOLIN HFA) 108 (90 Base) MCG/ACT inhaler     BASAGLAR 100 UNIT/ML injection     Bictegravir-Emtricitab-Tenofov -25 MG TABS     blood glucose (NO BRAND SPECIFIED) lancets standard     blood glucose monitoring (NO BRAND SPECIFIED) meter device kit     blood glucose monitoring (NO BRAND SPECIFIED) test strip     calcium carbonate (TUMS) 500 MG chewable tablet     CHANTIX STARTING MONTH OLIVERIO 0.5 MG X 11 & 1 MG X 42 tablet      "doxycycline hyclate (VIBRAMYCIN) 100 MG capsule     empagliflozin (JARDIANCE) 10 MG TABS tablet     gabapentin (NEURONTIN) 100 MG capsule     glycerin, adult, 2 g SUPP Suppository     hyoscyamine 0.125 MG TBDP     ibuprofen (ADVIL/MOTRIN) 200 MG tablet     insulin glargine (LANTUS SOLOSTAR) 100 UNIT/ML pen     insulin pen needle (B-D U/F) 31G X 8 MM     LORazepam (ATIVAN) 1 MG tablet     magnesium oxide (MAG-OX) 400 MG tablet     methylPREDNISolone (MEDROL DOSEPAK) 4 MG tablet     montelukast (SINGULAIR) 10 MG tablet     omeprazole (PRILOSEC) 20 MG CR capsule     ranitidine (ZANTAC) 150 MG tablet     simethicone (MYLICON) 80 MG chewable tablet     terbinafine (LAMISIL) 1 % external cream     trimethoprim-polymyxin b (POLYTRIM) 20402-9.1 UNIT/ML-% ophthalmic solution        Allergies   Allergen Reactions     Metformin      Abdominal pain     Milk [Lac Bovis] Hives     Tylenol [Acetaminophen] Itching     Dulaglutide Rash     I have reviewed the Medications, Allergies, Past Medical and Surgical History, and Social History in the Epic system.    Review of Systems   Constitutional: Negative for fever.   HENT: Negative for congestion.    Eyes: Negative for redness.   Respiratory: Negative for shortness of breath.    Cardiovascular: Negative for chest pain.   Gastrointestinal: Positive for abdominal pain, nausea and vomiting. Negative for abdominal distention, constipation and diarrhea.   Genitourinary: Negative for difficulty urinating.   Musculoskeletal: Negative for arthralgias and neck stiffness.   Skin: Negative for color change.   Neurological: Negative for headaches.   Psychiatric/Behavioral: Negative for confusion.   All other systems reviewed and are negative.      Physical Exam   BP: 129/86  Pulse: 111  Heart Rate: 111  Temp: 97.7  F (36.5  C)  Resp: 18  Height: 162.6 cm (5' 4\")  Weight: 72.6 kg (160 lb)  SpO2: 99 %      Physical Exam   Constitutional: He appears well-developed and well-nourished. No distress. "   HENT:   Head: Normocephalic and atraumatic.   Mouth/Throat: Oropharynx is clear and moist.   Eyes: Pupils are equal, round, and reactive to light. No scleral icterus.   Neck: Normal range of motion.   Cardiovascular: Normal rate, regular rhythm, normal heart sounds and intact distal pulses.   Pulmonary/Chest: Effort normal and breath sounds normal. No respiratory distress.   Abdominal: Soft. Bowel sounds are normal. There is tenderness in the epigastric area. There is no rigidity and no guarding.   Musculoskeletal: Normal range of motion. He exhibits no edema or tenderness.   Neurological: He is alert. He has normal strength. Coordination normal.   Skin: Skin is warm. No rash noted. He is not diaphoretic.   Nursing note and vitals reviewed.      ED Course        Procedures       9:29 AM  The patient was seen and examined by Dr. Jacobson in Room 3.          EKG Interpretation:      Interpreted by SHAHNAZ JACOBSON MD  Time reviewed: 926  Symptoms at time of EK   Rhythm: normal sinus   Rate: 95  Axis: normal  Ectopy: none  Conduction: normal  ST Segments/ T Waves: No ST-T wave changes  Q Waves: none  Comparison to prior: Previously sinus tachycardia    Clinical Impression: normal EKG    Results for orders placed or performed during the hospital encounter of 19   XR Abdomen 2 Views    Narrative    EXAM: XR ABDOMEN 2 VW  2019 11:35 AM     HISTORY:  abdominal pain, vomiting       COMPARISON:  10/5/2018    TECHNIQUE:  Upright, supine AP radiographs of the abdomen.    FINDINGS:     Nonobstructive bowel gas pattern. No portal venous gas, pneumatosis or  free air in the abdomen.    The included osseous structures and soft tissues are within normal  limits.     The lung bases are clear. Partially visualize penile prosthesis.      Impression    IMPRESSION: Nonobstructive bowel gas pattern. Moderate stool burden.  Otherwise no findings on this study to explain the patient's abdominal  pain, vomiting.    I have  personally reviewed the examination and initial interpretation  and I agree with the findings.    DRE HIGGINS MD   CBC with platelets differential   Result Value Ref Range    WBC 11.2 (H) 4.0 - 11.0 10e9/L    RBC Count 5.02 4.4 - 5.9 10e12/L    Hemoglobin 15.4 13.3 - 17.7 g/dL    Hematocrit 45.2 40.0 - 53.0 %    MCV 90 78 - 100 fl    MCH 30.7 26.5 - 33.0 pg    MCHC 34.1 31.5 - 36.5 g/dL    RDW 12.8 10.0 - 15.0 %    Platelet Count 462 (H) 150 - 450 10e9/L    Diff Method Automated Method     % Neutrophils 73.6 %    % Lymphocytes 17.7 %    % Monocytes 5.3 %    % Eosinophils 1.6 %    % Basophils 0.6 %    % Immature Granulocytes 1.2 %    Nucleated RBCs 0 0 /100    Absolute Neutrophil 8.3 1.6 - 8.3 10e9/L    Absolute Lymphocytes 2.0 0.8 - 5.3 10e9/L    Absolute Monocytes 0.6 0.0 - 1.3 10e9/L    Absolute Eosinophils 0.2 0.0 - 0.7 10e9/L    Absolute Basophils 0.1 0.0 - 0.2 10e9/L    Abs Immature Granulocytes 0.1 0 - 0.4 10e9/L    Absolute Nucleated RBC 0.0    Comprehensive metabolic panel   Result Value Ref Range    Sodium 135 133 - 144 mmol/L    Potassium 3.2 (L) 3.4 - 5.3 mmol/L    Chloride 99 94 - 109 mmol/L    Carbon Dioxide 25 20 - 32 mmol/L    Anion Gap 11 3 - 14 mmol/L    Glucose 237 (H) 70 - 99 mg/dL    Urea Nitrogen 12 7 - 30 mg/dL    Creatinine 0.72 0.66 - 1.25 mg/dL    GFR Estimate >90 >60 mL/min/[1.73_m2]    GFR Estimate If Black >90 >60 mL/min/[1.73_m2]    Calcium 9.6 8.5 - 10.1 mg/dL    Bilirubin Total 0.9 0.2 - 1.3 mg/dL    Albumin 3.8 3.4 - 5.0 g/dL    Protein Total 8.0 6.8 - 8.8 g/dL    Alkaline Phosphatase 126 40 - 150 U/L    ALT 29 0 - 70 U/L    AST 28 0 - 45 U/L   Lipase   Result Value Ref Range    Lipase 96 73 - 393 U/L   Glucose by meter   Result Value Ref Range    Glucose 271 (H) 70 - 99 mg/dL   EKG 12 lead   Result Value Ref Range    Interpretation ECG Click View Image link to view waveform and result           Critical Care time:    Medications   0.9% sodium chloride BOLUS (0 mLs Intravenous  Stopped 4/7/19 7041)   metoclopramide (REGLAN) injection 5 mg (5 mg Intravenous Given 4/7/19 1032)   lidocaine VISCOUS (XYLOCAINE) 2 % 15 mL, alum & mag hydroxide-simethicone (MYLANTA ES/MAALOX  ES) 15 mL GI Cocktail (30 mLs Oral Given 4/7/19 1327)      2:03 PM Abdominal pain gone.  Abdomen soft and non-tender.           Assessments & Plan (with Medical Decision Making)   53 year old male with history of HIV/AIDS, gastritis, appendectomy, and small bowel obstruction to the emergency department with epigastric abdominal pain with associated nausea and vomiting that began earlier this morning.  In the emergency department, the patient has some mild tachycardia.  He has abdominal tenderness in the epigastric region but no where else..  Remainder of his physical examination is unrevealing.  Patient has a mild leukocytosis.  His liver enzymes and lipase are normal.  He has some mild hyperglycemia.  Issue with IV normal saline as well as a dose of Reglan.  He states his symptoms are not consistent with his previous small bowel obstructions.  He is ultimately given a GI cocktail with resolution of his pain.  His abdominal examination prior to discharge was completely normal.  Patient be discharged home.  He is already on omeprazole.  Ondansetron prescribed for nausea.  Primary care follow-up recommended.  Suspect dyspepsia/gastritis.  No evidence for active GI bleed.  No evidence for acute liver disease/cholecystitis or pancreatitis.    I have reviewed the nursing notes.    I have reviewed the findings, diagnosis, plan and need for follow up with the patient.       Medication List      Started    ondansetron 4 MG ODT tab  Commonly known as:  ZOFRAN ODT  4 mg, Oral, EVERY 8 HOURS PRN            Final diagnoses:   Abdominal pain, epigastric   Non-intractable vomiting with nausea, unspecified vomiting type   Gastritis without bleeding, unspecified chronicity, unspecified gastritis type   I, Ignacio Medeiros, am serving as a trained  medical scribe to document services personally performed by Wojciech Mackay MD, based on the provider's statements to me.   I, Wojciech Mackay MD, was physically present and have reviewed and verified the accuracy of this note documented by Ignacio Medeiros.      4/7/2019   [facility], [place], EMERGENCY DEPARTMENT     Wojciech Mackay MD  04/07/19 9580

## 2019-04-07 NOTE — ED TRIAGE NOTES
"ED TRIAGE    Medical / Trauma C/o:  53-yr male patient - presenting to ED for eval / management of Abdominal pain, N/V; since 03:00am, today.    Duration of C/o:  6 hours    Contributing Factors / Concerning HX:  See HX    Significant Med's / Tx's:  See med's    Febrile / Afebrile:  Afebrile    Patient Vitals for the past 24 hrs:   BP Temp Temp src Pulse Heart Rate Resp SpO2 Height Weight   04/07/19 0918 129/86 97.7  F (36.5  C) Oral 111 111 18 99 % 1.626 m (5' 4\") 72.6 kg (160 lb)       Parish Melo  April 7, 2019  9:20 AM  "

## 2019-04-07 NOTE — ED AVS SNAPSHOT
[facility], [place], Emergency Department  [address] [place] [id number]  Phone:  [phone]                                    Andrew Lockwood   MRN: [id number]    Department:  [facility], Emergency Department   Date of Visit:  4/7/2019           After Visit Summary Signature Page    I have received my discharge instructions, and my questions have been answered. I have discussed any challenges I see with this plan with the nurse or doctor.    ..........................................................................................................................................  Patient/Patient Representative Signature      ..........................................................................................................................................  Patient Representative Print Name and Relationship to Patient    ..................................................               ................................................  Date                                   Time    ..........................................................................................................................................  Reviewed by Signature/Title    ...................................................              ..............................................  Date                                               Time          22EPIC Rev 08/18

## 2019-04-07 NOTE — DISCHARGE INSTRUCTIONS
Continue omeprazole.  Be sure to take your dose 20-30 minutes before eating.  Take ondansetron as needed for nausea.      Please make an appointment to follow up with Your Primary Care Provider this week.    Return to the emergency department if fever, worsening symptoms, or other concerns.

## 2019-04-08 DIAGNOSIS — R12 HEART BURN: ICD-10-CM

## 2019-04-08 LAB — INTERPRETATION ECG - MUSE: NORMAL

## 2019-04-12 ENCOUNTER — PRE VISIT (OUTPATIENT)
Dept: ORTHOPEDICS | Facility: CLINIC | Age: 54
End: 2019-04-12

## 2019-04-12 ENCOUNTER — TELEPHONE (OUTPATIENT)
Dept: PHARMACY | Facility: CLINIC | Age: 54
End: 2019-04-12

## 2019-04-12 NOTE — TELEPHONE ENCOUNTER
DIEGO for patient, via phone and text, asking him to call me. Would like to check on medication adherence/missed appointment.    Keesha Zelaya, [facility] Pharmacist.   [phone]

## 2019-04-13 DIAGNOSIS — E11.65 TYPE 2 DIABETES MELLITUS WITH HYPERGLYCEMIA, WITHOUT LONG-TERM CURRENT USE OF INSULIN (H): ICD-10-CM

## 2019-04-13 NOTE — TELEPHONE ENCOUNTER
insulin glargine (BASAGLAR KWIKPEN) 100 UNIT/ML pen      Last Written Prescription Date:  10-10-19  Last Fill Quantity: 15 ml,   # refills: 0  Last Office Visit : 9-6-18  Future Office visit:  none    Routing refill request to provider for review/approval because:  Insulin - refilled per clinic      Kathleen M Doege RN

## 2019-04-15 ENCOUNTER — OFFICE VISIT (OUTPATIENT)
Dept: INFECTIOUS DISEASES | Facility: CLINIC | Age: 54
End: 2019-04-15
Attending: INTERNAL MEDICINE
Payer: COMMERCIAL

## 2019-04-15 VITALS
TEMPERATURE: 98.4 F | DIASTOLIC BLOOD PRESSURE: 81 MMHG | BODY MASS INDEX: 28.17 KG/M2 | WEIGHT: 165 LBS | OXYGEN SATURATION: 96 % | SYSTOLIC BLOOD PRESSURE: 124 MMHG | RESPIRATION RATE: 16 BRPM | HEART RATE: 106 BPM | HEIGHT: 64 IN

## 2019-04-15 DIAGNOSIS — B20 HUMAN IMMUNODEFICIENCY VIRUS (HIV) DISEASE (H): Primary | ICD-10-CM

## 2019-04-15 DIAGNOSIS — B20 HUMAN IMMUNODEFICIENCY VIRUS (HIV) DISEASE (H): ICD-10-CM

## 2019-04-15 LAB
ALBUMIN SERPL-MCNC: 3.9 G/DL (ref 3.4–5)
ALP SERPL-CCNC: 142 U/L (ref 40–150)
ALT SERPL W P-5'-P-CCNC: 28 U/L (ref 0–70)
ANION GAP SERPL CALCULATED.3IONS-SCNC: 7 MMOL/L (ref 3–14)
AST SERPL W P-5'-P-CCNC: 21 U/L (ref 0–45)
BASOPHILS # BLD AUTO: 0.1 10E9/L (ref 0–0.2)
BASOPHILS NFR BLD AUTO: 0.9 %
BILIRUB SERPL-MCNC: 0.6 MG/DL (ref 0.2–1.3)
BUN SERPL-MCNC: 13 MG/DL (ref 7–30)
CALCIUM SERPL-MCNC: 9.4 MG/DL (ref 8.5–10.1)
CHLORIDE SERPL-SCNC: 107 MMOL/L (ref 94–109)
CO2 SERPL-SCNC: 23 MMOL/L (ref 20–32)
CREAT SERPL-MCNC: 0.69 MG/DL (ref 0.66–1.25)
DIFFERENTIAL METHOD BLD: ABNORMAL
EOSINOPHIL # BLD AUTO: 0.3 10E9/L (ref 0–0.7)
EOSINOPHIL NFR BLD AUTO: 3 %
ERYTHROCYTE [DISTWIDTH] IN BLOOD BY AUTOMATED COUNT: 13.2 % (ref 10–15)
GFR SERPL CREATININE-BSD FRML MDRD: >90 ML/MIN/{1.73_M2}
GLUCOSE SERPL-MCNC: 160 MG/DL (ref 70–99)
HCT VFR BLD AUTO: 43.3 % (ref 40–53)
HGB BLD-MCNC: 15 G/DL (ref 13.3–17.7)
IMM GRANULOCYTES # BLD: 0.1 10E9/L (ref 0–0.4)
IMM GRANULOCYTES NFR BLD: 1.2 %
LYMPHOCYTES # BLD AUTO: 2.7 10E9/L (ref 0.8–5.3)
LYMPHOCYTES NFR BLD AUTO: 29.2 %
MCH RBC QN AUTO: 31.1 PG (ref 26.5–33)
MCHC RBC AUTO-ENTMCNC: 34.6 G/DL (ref 31.5–36.5)
MCV RBC AUTO: 90 FL (ref 78–100)
MONOCYTES # BLD AUTO: 0.7 10E9/L (ref 0–1.3)
MONOCYTES NFR BLD AUTO: 7.3 %
NEUTROPHILS # BLD AUTO: 5.4 10E9/L (ref 1.6–8.3)
NEUTROPHILS NFR BLD AUTO: 58.4 %
NRBC # BLD AUTO: 0 10*3/UL
NRBC BLD AUTO-RTO: 0 /100
PLATELET # BLD AUTO: 476 10E9/L (ref 150–450)
POTASSIUM SERPL-SCNC: 3.6 MMOL/L (ref 3.4–5.3)
PROT SERPL-MCNC: 7.8 G/DL (ref 6.8–8.8)
RBC # BLD AUTO: 4.82 10E12/L (ref 4.4–5.9)
SODIUM SERPL-SCNC: 137 MMOL/L (ref 133–144)
WBC # BLD AUTO: 9.3 10E9/L (ref 4–11)

## 2019-04-15 PROCEDURE — G0463 HOSPITAL OUTPT CLINIC VISIT: HCPCS | Mod: ZF

## 2019-04-15 PROCEDURE — 86359 T CELLS TOTAL COUNT: CPT | Performed by: INTERNAL MEDICINE

## 2019-04-15 PROCEDURE — 85025 COMPLETE CBC W/AUTO DIFF WBC: CPT | Performed by: INTERNAL MEDICINE

## 2019-04-15 PROCEDURE — 80053 COMPREHEN METABOLIC PANEL: CPT | Performed by: INTERNAL MEDICINE

## 2019-04-15 PROCEDURE — 87536 HIV-1 QUANT&REVRSE TRNSCRPJ: CPT | Performed by: INTERNAL MEDICINE

## 2019-04-15 PROCEDURE — 36415 COLL VENOUS BLD VENIPUNCTURE: CPT | Performed by: INTERNAL MEDICINE

## 2019-04-15 PROCEDURE — 86360 T CELL ABSOLUTE COUNT/RATIO: CPT | Performed by: INTERNAL MEDICINE

## 2019-04-15 ASSESSMENT — MIFFLIN-ST. JEOR: SCORE: 1504.44

## 2019-04-15 ASSESSMENT — PAIN SCALES - GENERAL: PAINLEVEL: NO PAIN (0)

## 2019-04-15 NOTE — LETTER
4/15/2019       RE: Andrew Lockwood  [address]  [place] [address]     Dear Colleague,    Thank you for referring your patient, Andrew Lockwood, to the [facility] AND INFECTIOUS DISEASES at [facility]. Please see a copy of my visit note below.    [facility]  Infectious Disease Clinic Note     Patient:  Andrew Lockwood, Date of birth 11/27/1967, Medical record number [id number]  Date of Visit:  04/15/2019           Assessment and Plan:     Plan:  - continue Biktarvy  - CBC with Diff, CMP, HIV-1 RNA, T cell subsets    -If labs are stable, patient can return to care in 3 months.    -Encouraged a dental visit.    -Patient needs follow-up with colorectal surgery for hx of LSIL on anal pap, but has no showed for multiple visits.    -Diabetes care per endocrinology.    Assessment:    Infectious Disease issues include:    - HIV/AIDS: Mr. Lockwood was diagnosed with HIV/AIDS in 2015 when he presented with toxoplasmosis. He has had difficulty with adherence and previously been treated with Triumeq and Genvoya.  He complained of food insecurity and wanted a regimen that did not need to be taken with food. He was switched to Biktarvy on 7/19/2018 and reports excellent adherence since that time. He does not note any adverse effects from Biktarvy, and he is not noting any associated GI upset.   He now has stable housing since ~ January 1st 2019.      - STI risk:   Mr. Lockwood reports that his partner will be moving in with him soon.  He denies any sexual activity and tells me that he has not had any sex in over a year.  However, his partner has Hepatitis C and a history of IVDU with heroin.  The partner is currently on suboxone and sober for 4 months.  Patient declined any STI screening today, including testing for Hepatitis C.   Would continue to discuss this at future visits.    Preventative health care  Cardiovascular Juan Jose -               Lipids -  "CHOL 180,  (8/1 5/2018)               Blood Pressure -  BP  124/81 today   Endocrine: DMII. Followed by endocrine.  Cancer Screening              Colon - Up to date. [facility]              Anal - Anal pap with colorectal 6/22/2018 with LSIL. Patient needs to follow up with colorectal for this, but has no-showed for multiple visits.  Immunizations              Hepatitis A - immune, serology 4/13/15              Hepatitis B - 6/16/2016, 3/10/2016, 8/31/2017              Influenza - not documented for 2018/2019 season              PCV 13 - 3/10/2016   Pneumovax - 6/16/2016              Tdap - 10/17/2016              Menactra - 8/31/2015 and 3/10/2016  Renal Health -  UA without proteinuria 12/19/2018 and creatinine normal last 4/7/2019  Sexually Transmitted Infection Risk and Screening              Gonorrhea and Chlamydia - GC/Chlamydia negative on 2/22/18, declined testing today, he has a boyfriend but says they are not sexually active.               Syphilis - negative 11/2018  Tobacco abuse: patient working on quitting and down to several cigarettes per day.       Ayala Prieto MD, MS  Infectious Disease      I spent 40 minutes in direct care with this patient, including >50% of time face-to-face with the patient counseling about STI risk, importance of adherence to HIV medications, and the importance of engagement in care for Diabetes.            History of the Infectious Disease Illness:     Mr. Lockwood is a 53 year old man with HIV previously followed by Dr. Rosado since his diagnosis in 2015.  She left the practice, and he was last seen in this clinic 8/15/2018 by Dr. Carbajal.      The biggest change in his life in the interim is that he now has stable housing since January 2019.  Prior to that, he had multiple years of homelessness.  Most recently, [facility] was in the [facility] in [place].    He reports close and easy adherence to Biktarvy.  He was switched in 7/2018 to this regimen because he wanted " something that did not require food.  He is very happy with this regimen and reports no adverse effects.     His partner is planning on moving in with him very soon.  Mr. Lockwood reports that his partner was an injection drug user with heroin, but is now on suboxone and sober for the past 4 months.  Mr. Lockwood also reports that his partner has Hepatitis C infection, and a history of endocarditis that required surgery.    Review of Systems:  CONSTITUTIONAL:  No fevers or chills. No night sweats.  EYES: negative for icterus  ENT:  negative for hearing loss, tinnitus or sore throat  RESPIRATORY:  negative for cough, sputum, dyspnea  CARDIOVASCULAR:  negative for chest pain, palpitations  GASTROINTESTINAL:  Positive for recent nausea/vomiting (seen in the ED 4/7/2019, no concerning findings), now resolved  GENITOURINARY:  negative for dysuria  HEME:  no easy bruising  INTEGUMENT: negative for rash and pruritis.  NEURO:  negative for headache    Past Medical History:   Diagnosis Date     AIDS (H)      Allergic rhinitis due to other allergen     DNS     Chronic abdominal pain      CNS toxoplasmosis (H)      Diabetes type 2, controlled (H)      GERD (gastroesophageal reflux disease)      HIV (human immunodeficiency virus infection) (H)      Periungual wart      Sleep apnea     doesn't use CPAP       HIV History  Date of Diagnosis: 4/2015  Approximated time of transmission:2008  CD4 Josue: 25  Viral Load at Diagnosis: 171,654  Opportunistic Infections: Toxoplasmosis  CMV Status: IgG+ (4/12/15)  Toxo Status: + (4/12/15), Had CNS toxoplasmosis as his primary illness at the time of diagnosis  HLA  Status: 4/25/15 negative  Tuberculosis Screening: Exposure to a friend for treated for active TB several years ago. They did not live together.  Historical use of ARVs: Triumeq, Genvoya  Historical Resistance Mutations: Susceptible      Past Surgical History:   Procedure Laterality Date     C NONSPECIFIC PROCEDURE      right  forearm fracture     COLONOSCOPY Left 1/22/2016    Procedure: COMBINED COLONOSCOPY, SINGLE OR MULTIPLE BIOPSY/POLYPECTOMY BY BIOPSY;  Surgeon: Clark Saini MD;  Location: [facility]     HC EXPLORE UNDESC TESTIS,INGUIN/SCROTAL       LAPAROSCOPIC APPENDECTOMY N/A 1/31/2018    Procedure: LAPAROSCOPIC APPENDECTOMY;  LAPAROSCOPIC APPENDECTOMY;  Surgeon: aDwn Holt MD;  Location: [facility] OR     LAPAROSCOPY DIAGNOSTIC (GENERAL) N/A 7/26/2016    Procedure: LAPAROSCOPY DIAGNOSTIC (GENERAL);  Surgeon: Susannah Arriaga MD;  Location: [facility] OR     LAPAROSCOPY DIAGNOSTIC (GENERAL) N/A 4/16/2018    Procedure: LAPAROSCOPY DIAGNOSTIC (GENERAL);  Diagnostic laparoscopy and lysis of adhesions;  Surgeon: Prince Dowling MD;  Location: [facility] OR     OPTICAL TRACKING SYSTEM CRANIOTOMY, EXCISE TUMOR, COMBINED Left 4/10/2015    Procedure: COMBINED OPTICAL TRACKING SYSTEM CRANIOTOMY, EXCISE TUMOR;  Surgeon: Mirlande Colmenares MD;  Location: [facility] OR     REPAIR GAMEKEEPER'S THUMB Right 12/2/2016    Procedure: REPAIR LIGAMENT ULNAR COLLATERAL THUMB (GAMEKEEPER'S);  Surgeon: Evin Zamorano MD;  Location: [facility]       Family History   Problem Relation Age of Onset     Diabetes Brother      Diabetes Father      Alzheimer Disease Father      Unknown/Adopted Mother      Diabetes Paternal Grandfather      Cancer No family hx of         no skin cancer     Skin Cancer No family hx of         no famiy hx of skin cancer     Glaucoma No family hx of      Macular Degeneration No family hx of        Social History     Social History Narrative    Born in [place] [facility].  [place] to the [place] in [place].  Last traveled to visit family in 2008.        1/7/2019 - Homeless. Working with a  at [employer] [[email or web address]] trying to get housing. Sexually active with two partners recently using condoms 100% of the time. Smokes occasionally, not for the last 4 weeks.     Social History     Tobacco Use     Smoking status: Current Some Day Smoker      "Packs/day: 0.25     Types: Cigarettes     Last attempt to quit: 10/28/2016     Years since quittin.4     Smokeless tobacco: Former User   Substance Use Topics     Alcohol use: No     Alcohol/week: 0.0 oz     Comment: Last etoh in      Drug use: No     Types: Marijuana     Comment: \"Not very often\"       Immunization History   Administered Date(s) Administered     HepB 03/10/2016, 2016     HepB-Adult 2017     Influenza (IIV3) PF 10/17/2016     Influenza Vaccine IM 3yrs+ 4 Valent IIV4 2015, 2017     Mantoux Tuberculin Skin Test 2015     Meningococcal (Menactra ) 2015, 03/10/2016     Pneumo Conj 13-V (2010&after) 03/10/2016     Pneumococcal 23 valent 2016     TDAP Vaccine (Boostrix) 10/17/2016       Patient Active Problem List   Diagnosis     Allergic rhinitis due to other allergen     Brain lesion     Toxoplasma encephalitis (H)     Pulmonary nodules     Human immunodeficiency virus I infection (H)     Folliculitis     Prurigo nodularis     Epidermal cyst     Shoulder joint pain, unspecified laterality     CNS toxoplasmosis (H)     Constipation     Non-intractable vomiting with nausea, vomiting of unspecified type     Thrush     Insomnia, unspecified insomnia     Bowel obstruction (H)     Toxoplasmosis     Gastroesophageal reflux disease     Headache     Aphthous ulcer     Type 2 diabetes mellitus (H)     Small bowel obstruction (H)     SBO (small bowel obstruction) (H)     Slow transit constipation     Periungual wart     Herpes zoster     Erectile dysfunction, unspecified erectile dysfunction type     Insomnia, unspecified type     Preventative health care     Pain of right thumb     Rupture of ulnar collateral ligament of right thumb     Aftercare following surgery of the musculoskeletal system     Panic disorder     Generalized anxiety disorder     Major depressive disorder, single episode, unspecified     Abdominal pain     Acute appendicitis with localized " "peritonitis     S/P appendectomy     Abdominal abscess     Appendicitis     Otitis media     Horseshoe tear of retina of left eye without detachment     Adynamic ileus (H)     Pneumonia       Current Outpatient Medications   Medication Sig     BASAGLAR 100 UNIT/ML injection Inject 20 Units Subcutaneous daily (Patient taking differently: Inject 50 Units Subcutaneous daily )     Bictegravir-Emtricitab-Tenofov -25 MG TABS Take 1 tablet by mouth daily     blood glucose (NO BRAND SPECIFIED) lancets standard Use to test blood sugar four times daily or as directed.     blood glucose monitoring (NO BRAND SPECIFIED) meter device kit Use to test blood sugar 4 times daily or as directed.     blood glucose monitoring (NO BRAND SPECIFIED) test strip 1 strip by In Vitro route 4 times daily (before meals and nightly) Use to test blood sugars 4 times daily or as directed     empagliflozin (JARDIANCE) 10 MG TABS tablet Take 1 tablet by mouth daily (Patient not taking: Reported on 4/15/2019)     insulin pen needle (B-D U/F) 31G X 8 MM Use 1 pen needles daily or as directed. (Patient not taking: Reported on 4/15/2019)     omeprazole (PRILOSEC) 20 MG CR capsule Take 1 capsule (20 mg) by mouth 2 times daily (Patient not taking: Reported on 4/15/2019)     ranitidine (ZANTAC) 150 MG tablet Take 1 tablet (150 mg) by mouth 2 times daily (Patient not taking: Reported on 4/15/2019)     No current facility-administered medications for this visit.        Allergies   Allergen Reactions     Metformin      Abdominal pain     Milk [Lac Bovis] Hives     Tylenol [Acetaminophen] Itching     Dulaglutide Rash              Physical Exam:   Vitals were reviewed.  All vitals stable  /81 (BP Location: Right arm, Patient Position: Sitting, Cuff Size: Adult Regular)   Pulse 106   Temp 98.4  F (36.9  C) (Oral)   Resp 16   Ht 1.626 m (5' 4\")   Wt 74.8 kg (165 lb)   SpO2 96%   BMI 28.32 kg/m       Exam:  GENERAL:  well-developed, " well-nourished, alert, oriented, in no acute distress.  HEENT:  Head is normocephalic, atraumatic   EYES:  Eyes have anicteric sclerae.    ENT:  Oropharynx is moist without exudates or ulcers.  Poor dentition  NECK:  Supple.  LUNGS:  Clear to auscultation.  CARDIOVASCULAR:  Regular rate and rhythm with no murmurs, gallops or rubs.  ABDOMEN:  Normal bowel sounds, soft, nontender.  SKIN: Multiple excoriated papules on extremities without surrounding erythema or discharge.  NEUROLOGIC:  Grossly nonfocal.         Laboratory Data:     Absolute CD4   Date Value Ref Range Status   11/06/2018 235 (L) 441 - 2,156 cells/uL Final   08/15/2018 317 (L) 441 - 2,156 cells/uL Final   07/19/2018 299 (L) 441 - 2,156 cells/uL Final   04/26/2018 299 (L) 441 - 2,156 cells/uL Final   03/07/2018 256 (L) 441 - 2,156 cells/uL Final   12/12/2017 225 (L) 441 - 2,156 cells/uL Final   08/11/2017 247 (L) 441 - 2,156 cells/uL Final   02/22/2017 183 (L) 441 - 2,156 cells/uL Final   11/07/2016 200 (L) 441 - 2,156 cells/uL Final   08/04/2016 181 (L) 441 - 2,156 cells/uL Final   05/20/2016 94 (L) 441 - 2,156 cells/uL Final   02/25/2016 56 (L) 441 - 2,156 cells/uL Final   01/10/2016 25 (L) 441 - 2,156 cells/uL Final   11/05/2015 31 (L) 441 - 2,156 cells/uL Final   10/06/2015 63 (L) 441 - 2,156 cells/uL Final     Comment:     Effective 12/08/2014, the reference range for this assay has changed to   reflect   new methodology.     06/29/2015 141 (L) 441 - 2,156 cells/uL Final     Comment:     Effective 12/08/2014, the reference range for this assay has changed to   reflect   new methodology.     05/26/2015 72 (L) 441 - 2,156 cells/uL Final     Comment:     Effective 12/08/2014, the reference range for this assay has changed to   reflect   new methodology.     04/10/2015 26 (L) 441 - 2,156 cells/uL Final     Comment:     Effective 12/08/2014, the reference range for this assay has changed to   reflect   new methodology.         Inflammatory Markers     Recent Labs   Lab Test 10/08/18  0720 10/06/18  0658 10/05/18  0818 03/06/18  2105 12/28/16  1540 12/26/16  2213 12/18/15  0354 07/07/15  2205 05/25/15  2217   SED  --   --  23*  --   --   --   --  25* 37*   CRP <2.9 15.0* 14.0* 53.0* <2.9 <2.9 5.3 7.8 5.0       Metabolic Studies       Recent Labs   Lab Test 04/07/19  1031 01/07/19  0556 01/07/19  0217 01/06/19  1537 01/01/19  1822 12/19/18  0610 11/06/18  1055  10/06/18  0658 10/05/18  0818  09/09/18  0816  07/19/18  1147    139  --  136 132* 137 133   < > 137 138   < > 138   < >  --    POTASSIUM 3.2* 3.5 3.5 3.2* 3.2* 4.3 3.2*   < > 3.9 3.3*   < > 3.4   < >  --    CHLORIDE 99 108  --  104 99 106 101   < > 106 104   < > 105   < >  --    CO2 25 21  --  24 24 25 22   < > 25 24   < > 26   < >  --    ANIONGAP 11 10  --  8 10 6 9   < > 6 10   < > 7   < >  --    BUN 12 7  --  12 12 15 11   < > 12 13   < > 11   < >  --    CR 0.72 0.58*  --  0.65* 0.65* 0.82 0.86   < > 0.78 0.81   < > 0.78   < >  --    GFRESTIMATED >90 >90  --  >90 >90 >90 >90   < > >90 >90   < > >90   < >  --    * 221*  --  123* 234* 259* 360*   < > 172* 225*   < > 158*   < >  --    A1C  --   --   --   --   --   --   --   --   --   --   --   --   --  8.9*   LINDA 9.6 8.1*  --  9.0 8.2* 9.1 8.9   < > 8.0* 9.6   < > 9.1   < >  --    PHOS  --   --   --  2.7  --   --   --   --  2.8 3.9   < >  --   --   --    MAG  --   --   --  2.0  --   --   --   --  2.1  --    < > 2.3  --   --    LACT  --   --   --   --   --   --   --   --   --  1.1  --  1.1  --   --     < > = values in this interval not displayed.       Hematology Studies      Recent Labs   Lab Test 04/07/19  1031 01/07/19  0644 01/06/19  1537 01/01/19  1822 12/19/18  0635 12/19/18  0610 11/06/18  1055   WBC 11.2* 7.2 11.2* 11.1* 7.9 Unsatisfactory specimen - clotted 7.0   ANEU 8.3 4.1 7.8 8.1 5.2  --  4.7   ALYM 2.0 1.8 2.0 1.8 1.6  --  1.7   BRITT 0.6 0.6 0.8 0.9 0.6  --  0.4   AEOS 0.2 0.3 0.2 0.2 0.4  --  0.1   HGB 15.4 11.9* 13.2* 13.4 13.9  Unsatisfactory specimen - clotted 13.5   HCT 45.2 35.6* 37.6* 40.3 41.6 Unsatisfactory specimen - clotted 38.8*   * 437 427 326 427 Unsatisfactory specimen - clotted 249       Clotting Studies    Recent Labs   Lab Test 02/05/18  0901 01/01/18  0412 11/06/17  2346 05/26/17  1501  01/09/16  0550  04/09/15  2100   INR 1.06 0.98 0.91 1.00   < > 1.06   < > 1.04   PTT  --  32  --   --   --  30  --  32    < > = values in this interval not displayed.       Urine Studies     Recent Labs   Lab Test 12/19/18  0611 10/30/18  1810 10/05/18  0836 09/09/18  0923 07/03/18  1040 04/16/18  0645 02/04/18  1640   URINEPH 7.0 5.5 5.5 6.5 6.5 7.0 7.0   NITRITE Negative Negative Negative Negative Negative Negative Negative   LEUKEST Negative Negative Negative Negative Negative Negative Negative   WBCU 0 <1  --   --  <1 None 2       CSF testing     Recent Labs   Lab Test 05/10/16  1345   CWBC 4   CRBC 1   CGLU 92*   CTP 55       Microbiology:  Last 6 Culture results with specimen source  Culture Micro   Date Value Ref Range Status   10/04/2018 (A)  Final    Moderate growth  Staphylococcus aureus  This isolate DOES NOT demonstrate inducible clindamycin resistance in vitro. Clindamycin   is susceptible and could be used when indicated, however, erythromycin is resistant and   should not be used.     02/05/2018 Moderate growth  Escherichia coli   (A)  Final   02/05/2018 Heavy growth  Streptococcus anginosus   (A)  Final   02/05/2018 Heavy growth  Mixed gram positive sushma    Final   02/05/2018 (A)  Final    Heavy growth  Bacteroides fragilis group  Susceptibility testing not routinely done     02/05/2018 (A)  Final    Heavy growth  Bacteroides fragilis  Susceptibility testing not routinely done     02/05/2018 (A)  Final    Plus  Heavy growth  Mixed aerobic and anaerobic sushma     02/05/2018 Canceled, Test credited  Final   02/05/2018 Incorrectly ordered by PCU/Clinic  Final   02/05/2018 Test reordered as correct code  Final    02/05/2018 Culture negative after 4 weeks  Final    Specimen Description   Date Value Ref Range Status   11/06/2018 Urine  Final   10/08/2018 Feces  Final   10/07/2018 Feces  Final   10/06/2018 Feces  Final   10/06/2018 Feces  Final   10/06/2018 Feces  Final        Last check of C difficile  C Diff Toxin B PCR   Date Value Ref Range Status   10/06/2018 Negative NEG^Negative Final     Comment:     Negative: Clostridium difficile target DNA sequences NOT detected, presumed   negative for Clostridium difficile toxin B or the number of bacteria present   may be below the limit of detection for the test.  FDA approved assay performed using [employer] real-time PCR.  A negative result does not exclude actual disease due to Clostridium difficile   and may be due to improper collection, handling and storage of the specimen   or the number of organisms in the specimen is below the detection limit of the   assay.         EBV Qualitative PCR   Date Value Ref Range Status   05/10/2016   Final    Not Detected  (Note)  NOT DETECTED - A negative result does not rule out the  presence of PCR inhibitors in the patient specimen or assay  specific nucleic acid in concentrations below the level of  detection by the assay.  INTERPRETIVE INFORMATION:  Hugo Barr Virus by PCR  Test developed and characteristics determined by [facility]. See Compliance Statement A: [employer]  Performed by [facility],  [address] [place] [id number] [phone]  [email or web address], Nayan Corley MD, Lab. Director       EBV PCR Specimen Source   Date Value Ref Range Status   05/10/2016 Cerebrospinal fluid  Final       Hepatitis B Testing     Recent Labs   Lab Test 04/13/15  0816   HBCAB Nonreactive   HEPBANG Nonreactive   HBCM Nonreactive   A nonreactive result suggests lack of recent exposure to the virus in the   preceding 6 months.          Hepatitis C Antibody   Date Value Ref Range Status   03/02/2017  NR Final     Nonreactive   Assay performance characteristics have not been established for newborns,   infants, and children     04/13/2015  NR Final    Nonreactive   Assay performance characteristics have not been established for newborns,   infants, and children         RSV Rapid Antigen Result   Date Value Ref Range Status   01/31/2018 Positive (A) NEG^Negative Final     Comment:     Test results must be correlated with clinical data. If necessary, results   should be confirmed by a molecular assay or viral culture.         CMV Antibody IgG   Date Value Ref Range Status   04/12/2015 (H) 0.0 - 0.8 AI Final    >8.0  Positive   Antibody index (AI) values reflect qualitative changes in antibody   concentration that cannot be directly associated with clinical condition or   disease state.         Pathology:  Reviewed in Epic. No recent relevant results.      Imaging:  Results for orders placed or performed during the hospital encounter of 07/04/18   CT Abdomen Pelvis w Contrast    Narrative    Examination:  CT ABDOMEN PELVIS W CONTRAST 7/4/2018 7:30 AM     Comparison: 4/20/2018, 4/16/2018    History: abd pain, HIV - eval colitis;     Technique: Volumetric helical acquisition of CT images from the lung  bases through the symphysis pubis after the administration of IV  contrast. Coronal and sagittal images were reconstructed from the  source data.    Findings:    Lung bases:   4 mm nodule in the right lung on the first image. Heart size is  normal. No pleural or pericardial effusion.    Abdomen/pelvis:  Hepatic steatosis. Stable hypodensity in the anterior liver segment IV  along the falciform ligament likely representing focal fatty  deposition. Unchanged 8 mm probable cyst in the right lobe of the  liver. The liver is otherwise unremarkable. The spleen, kidneys,  adrenal glands, and pancreas appear normal. There are dilated loops of  small bowel in the midabdomen measuring up to 3.9 cm. Transition point  is noted on series 2,  image 45. Bowel wall enhancement is normal.  There is no wall thickening. No pneumatosis, free fluid or free  intraperitoneal gas. The colon is unremarkable. The bladder is  predominantly decompressed with mild wall thickening, unchanged.  Partially visualized penile prosthesis. The major transabdominal  vasculature is patent and within normal limits for caliber. No  intra-abdominal lymphadenopathy.    Bones:   No acute osseus abnormality or suspicious bony lesion.      Impression    Impression: Small bowel obstruction with transition point in the right  lower quadrant. Appearance is similar to, although less severe, than  4/16/2018.     [Result: Small bowel obstruction]    Finding was identified on 7/4/2018 8:12 AM.     Dr. Lehman was contacted by Dr. Smith at 7/4/2018 8:17 AM and  verbalized understanding of the urgent finding.     I have personally reviewed the examination and initial interpretation  and I agree with the findings.    MEGHNA LEWIS MD           Again, thank you for allowing me to participate in the care of your patient.      Sincerely,    Ayala Prieto MD

## 2019-04-15 NOTE — NURSING NOTE
"Chief Complaint   Patient presents with     Consult     B20       Vital signs:  Temp: 98.4  F (36.9  C) Temp src: Oral BP: 124/81 Pulse: 106   Resp: 16 SpO2: 96 %     Height: 162.6 cm (5' 4\") Weight: 74.8 kg (165 lb)  Estimated body mass index is 28.32 kg/m  as calculated from the following:    Height as of this encounter: 1.626 m (5' 4\").    Weight as of this encounter: 74.8 kg (165 lb).          [facility]  4/15/2019 2:09 PM      "

## 2019-04-15 NOTE — PROGRESS NOTES
[facility]  Infectious Disease Clinic Note     Patient:  Andrew Lockwood, Date of birth 11/27/1967, Medical record number [id number]  Date of Visit:  04/15/2019           Assessment and Plan:     Plan:  - continue Biktarvy  - CBC with Diff, CMP, HIV-1 RNA, T cell subsets    -If labs are stable, patient can return to care in 3 months.    -Encouraged a dental visit.    -Patient needs follow-up with colorectal surgery for hx of LSIL on anal pap, but has no showed for multiple visits.    -Diabetes care per endocrinology.    Assessment:    Infectious Disease issues include:    - HIV/AIDS: Mr. Lockwood was diagnosed with HIV/AIDS in 2015 when he presented with toxoplasmosis. He has had difficulty with adherence and previously been treated with Triumeq and Genvoya.  He complained of food insecurity and wanted a regimen that did not need to be taken with food. He was switched to Biktarvy on 7/19/2018 and reports excellent adherence since that time. He does not note any adverse effects from Biktarvy, and he is not noting any associated GI upset.   He now has stable housing since ~ January 1st 2019.      - STI risk:   Mr. Lockwood reports that his partner will be moving in with him soon.  He denies any sexual activity and tells me that he has not had any sex in over a year.  However, his partner has Hepatitis C and a history of IVDU with heroin.  The partner is currently on suboxone and sober for 4 months.  Patient declined any STI screening today, including testing for Hepatitis C.   Would continue to discuss this at future visits.    Preventative health care  Cardiovascular Juan Jose -               Lipids - CHOL 180,  (8/15/2018)               Blood Pressure -  /81 today   Endocrine: DMII. Followed by endocrine.  Cancer Screening              Colon - Up to date. [facility]              Anal - Anal pap with colorectal 6/22/2018 with LSIL. Patient needs to follow up with colorectal for this, but has  "no-showed for multiple visits.  Immunizations              Hepatitis A - immune, serology 4/13/15              Hepatitis B - 6/16/2016, 3/10/2016, 8/31/2017              Influenza - not documented for 2018/2019 season              PCV 13 - 3/10/2016   Pneumovax - 6/16/2016              Tdap - 10/17/2016              Menactra - 8/31/2015 and 3/10/2016  Renal Health -  UA without proteinuria 12/19/2018 and creatinine normal last 4/7/2019  Sexually Transmitted Infection Risk and Screening              Gonorrhea and Chlamydia - GC/Chlamydia negative on 2/22/18, declined testing today, he has a boyfriend but says they are not sexually active.               Syphilis - negative 11/2018  Tobacco abuse: patient working on quitting and down to several cigarettes per day.       Ayala Prieto MD, MS  Infectious Disease      I spent 40 minutes in direct care with this patient, including >50% of time face-to-face with the patient counseling about STI risk, importance of adherence to HIV medications, and the importance of engagement in care for Diabetes.            History of the Infectious Disease Illness:     Mr. Lockwood is a 53 year old man with HIV previously followed by Dr. Rosado since his diagnosis in 2015.  She left the practice, and he was last seen in this clinic 8/15/2018 by Dr. Carbajal.      The biggest change in his life in the interim is that he now has stable housing since January 2019.  Prior to that, he had multiple years of homelessness.  Most recently, he was in the [facility] in [place].    He reports close and easy adherence to Biktarvy.  He was switched in 7/2018 to this regimen because he wanted something that did not require food.  He is very happy with this regimen and reports no adverse effects.     His partner is planning on moving in with him very soon.  Mr. Lockwood reports that his partner was an injection drug user with heroin, but is now on suboxone and sober for the past 4 months.  Mr. Lockwood " also reports that his partner has Hepatitis C infection, and a history of endocarditis that required surgery.    Review of Systems:  CONSTITUTIONAL:  No fevers or chills. No night sweats.  EYES: negative for icterus  ENT:  negative for hearing loss, tinnitus or sore throat  RESPIRATORY:  negative for cough, sputum, dyspnea  CARDIOVASCULAR:  negative for chest pain, palpitations  GASTROINTESTINAL:  Positive for recent nausea/vomiting (seen in the ED 4/7/2019, no concerning findings), now resolved  GENITOURINARY:  negative for dysuria  HEME:  no easy bruising  INTEGUMENT: negative for rash and pruritis.  NEURO:  negative for headache    Past Medical History:   Diagnosis Date     AIDS (H)      Allergic rhinitis due to other allergen     DNS     Chronic abdominal pain      CNS toxoplasmosis (H)      Diabetes type 2, controlled (H)      GERD (gastroesophageal reflux disease)      HIV (human immunodeficiency virus infection) (H)      Periungual wart      Sleep apnea     doesn't use CPAP       HIV History  Date of Diagnosis: 4/2015  Approximated time of transmission:2008  CD4 Josue: 25  Viral Load at Diagnosis: 171,654  Opportunistic Infections: Toxoplasmosis  CMV Status: IgG+ (4/12/15)  Toxo Status: + (4/12/15), Had CNS toxoplasmosis as his primary illness at the time of diagnosis  HLA  Status: 4/25/15 negative  Tuberculosis Screening: Exposure to a friend for treated for active TB several years ago. They did not live together.  Historical use of ARVs: Triumeq, Genvoya  Historical Resistance Mutations: Susceptible      Past Surgical History:   Procedure Laterality Date     C NONSPECIFIC PROCEDURE      right forearm fracture     COLONOSCOPY Left 1/22/2016    Procedure: COMBINED COLONOSCOPY, SINGLE OR MULTIPLE BIOPSY/POLYPECTOMY BY BIOPSY;  Surgeon: Clark Saini MD;  Location: [facility] EXPLORE UNDESC TESTIS,INGUIN/SCROTAL       LAPAROSCOPIC APPENDECTOMY N/A 1/31/2018    Procedure: LAPAROSCOPIC  "APPENDECTOMY;  LAPAROSCOPIC APPENDECTOMY;  Surgeon: Dawn Holt MD;  Location: [facility] OR     LAPAROSCOPY DIAGNOSTIC (GENERAL) N/A 2016    Procedure: LAPAROSCOPY DIAGNOSTIC (GENERAL);  Surgeon: Susannah Arriaga MD;  Location: [facility] OR     LAPAROSCOPY DIAGNOSTIC (GENERAL) N/A 2018    Procedure: LAPAROSCOPY DIAGNOSTIC (GENERAL);  Diagnostic laparoscopy and lysis of adhesions;  Surgeon: Prince Dowling MD;  Location: [facility] OR     OPTICAL TRACKING SYSTEM CRANIOTOMY, EXCISE TUMOR, COMBINED Left 4/10/2015    Procedure: COMBINED OPTICAL TRACKING SYSTEM CRANIOTOMY, EXCISE TUMOR;  Surgeon: Mirlande Colmenares MD;  Location: [facility] OR     REPAIR GAMEKEEPER'S THUMB Right 2016    Procedure: REPAIR LIGAMENT ULNAR COLLATERAL THUMB (GAMEKEEPER'S);  Surgeon: Evin Zamorano MD;  Location: [facility] OR       Family History   Problem Relation Age of Onset     Diabetes Brother      Diabetes Father      Alzheimer Disease Father      Unknown/Adopted Mother      Diabetes Paternal Grandfather      Cancer No family hx of         no skin cancer     Skin Cancer No family hx of         no famiy hx of skin cancer     Glaucoma No family hx of      Macular Degeneration No family hx of        Social History     Social History Narrative    Born in [facility].  Came to the [place] in .  Last traveled to visit family in .        2019 - Homeless. Working with a  at [employer] trying to get housing. Sexually active with two partners recently using condoms 100% of the time. Smokes occasionally, not for the last 4 weeks.     Social History     Tobacco Use     Smoking status: Current Some Day Smoker     Packs/day: 0.25     Types: Cigarettes     Last attempt to quit: 10/28/2016     Years since quittin.4     Smokeless tobacco: Former User   Substance Use Topics     Alcohol use: No     Alcohol/week: 0.0 oz     Comment: Last etoh in      Drug use: No     Types: Marijuana     Comment: \"Not very often\" "       Immunization History   Administered Date(s) Administered     HepB 03/10/2016, 06/16/2016     HepB-Adult 08/31/2017     Influenza (IIV3) PF 10/17/2016     Influenza Vaccine IM 3yrs+ 4 Valent IIV4 12/22/2015, 12/14/2017     Mantoux Tuberculin Skin Test 04/12/2015     Meningococcal (Menactra ) 08/31/2015, 03/10/2016     Pneumo Conj 13-V (2010&after) 03/10/2016     Pneumococcal 23 valent 06/16/2016     TDAP Vaccine (Boostrix) 10/17/2016       Patient Active Problem List   Diagnosis     Allergic rhinitis due to other allergen     Brain lesion     Toxoplasma encephalitis (H)     Pulmonary nodules     Human immunodeficiency virus I infection (H)     Folliculitis     Prurigo nodularis     Epidermal cyst     Shoulder joint pain, unspecified laterality     CNS toxoplasmosis (H)     Constipation     Non-intractable vomiting with nausea, vomiting of unspecified type     Thrush     Insomnia, unspecified insomnia     Bowel obstruction (H)     Toxoplasmosis     Gastroesophageal reflux disease     Headache     Aphthous ulcer     Type 2 diabetes mellitus (H)     Small bowel obstruction (H)     SBO (small bowel obstruction) (H)     Slow transit constipation     Periungual wart     Herpes zoster     Erectile dysfunction, unspecified erectile dysfunction type     Insomnia, unspecified type     Preventative health care     Pain of right thumb     Rupture of ulnar collateral ligament of right thumb     Aftercare following surgery of the musculoskeletal system     Panic disorder     Generalized anxiety disorder     Major depressive disorder, single episode, unspecified     Abdominal pain     Acute appendicitis with localized peritonitis     S/P appendectomy     Abdominal abscess     Appendicitis     Otitis media     Horseshoe tear of retina of left eye without detachment     Adynamic ileus (H)     Pneumonia       Current Outpatient Medications   Medication Sig     BASAGLAR 100 UNIT/ML injection Inject 20 Units Subcutaneous daily  "(Patient taking differently: Inject 50 Units Subcutaneous daily )     Bictegravir-Emtricitab-Tenofov -25 MG TABS Take 1 tablet by mouth daily     blood glucose (NO BRAND SPECIFIED) lancets standard Use to test blood sugar four times daily or as directed.     blood glucose monitoring (NO BRAND SPECIFIED) meter device kit Use to test blood sugar 4 times daily or as directed.     blood glucose monitoring (NO BRAND SPECIFIED) test strip 1 strip by In Vitro route 4 times daily (before meals and nightly) Use to test blood sugars 4 times daily or as directed     empagliflozin (JARDIANCE) 10 MG TABS tablet Take 1 tablet by mouth daily (Patient not taking: Reported on 4/15/2019)     insulin pen needle (B-D U/F) 31G X 8 MM Use 1 pen needles daily or as directed. (Patient not taking: Reported on 4/15/2019)     omeprazole (PRILOSEC) 20 MG CR capsule Take 1 capsule (20 mg) by mouth 2 times daily (Patient not taking: Reported on 4/15/2019)     ranitidine (ZANTAC) 150 MG tablet Take 1 tablet (150 mg) by mouth 2 times daily (Patient not taking: Reported on 4/15/2019)     No current facility-administered medications for this visit.        Allergies   Allergen Reactions     Metformin      Abdominal pain     Milk [Lac Bovis] Hives     Tylenol [Acetaminophen] Itching     Dulaglutide Rash              Physical Exam:   Vitals were reviewed.  All vitals stable  /81 (BP Location: Right arm, Patient Position: Sitting, Cuff Size: Adult Regular)   Pulse 106   Temp 98.4  F (36.9  C) (Oral)   Resp 16   Ht 1.626 m (5' 4\")   Wt 74.8 kg (165 lb)   SpO2 96%   BMI 28.32 kg/m      Exam:  GENERAL:  well-developed, well-nourished, alert, oriented, in no acute distress.  HEENT:  Head is normocephalic, atraumatic   EYES:  Eyes have anicteric sclerae.    ENT:  Oropharynx is moist without exudates or ulcers.  Poor dentition  NECK:  Supple.  LUNGS:  Clear to auscultation.  CARDIOVASCULAR:  Regular rate and rhythm with no murmurs, gallops " or rubs.  ABDOMEN:  Normal bowel sounds, soft, nontender.  SKIN: Multiple excoriated papules on extremities without surrounding erythema or discharge.  NEUROLOGIC:  Grossly nonfocal.         Laboratory Data:     Absolute CD4   Date Value Ref Range Status   11/06/2018 235 (L) 441 - 2,156 cells/uL Final   08/15/2018 317 (L) 441 - 2,156 cells/uL Final   07/19/2018 299 (L) 441 - 2,156 cells/uL Final   04/26/2018 299 (L) 441 - 2,156 cells/uL Final   03/07/2018 256 (L) 441 - 2,156 cells/uL Final   12/12/2017 225 (L) 441 - 2,156 cells/uL Final   08/11/2017 247 (L) 441 - 2,156 cells/uL Final   02/22/2017 183 (L) 441 - 2,156 cells/uL Final   11/07/2016 200 (L) 441 - 2,156 cells/uL Final   08/04/2016 181 (L) 441 - 2,156 cells/uL Final   05/20/2016 94 (L) 441 - 2,156 cells/uL Final   02/25/2016 56 (L) 441 - 2,156 cells/uL Final   01/10/2016 25 (L) 441 - 2,156 cells/uL Final   11/05/2015 31 (L) 441 - 2,156 cells/uL Final   10/06/2015 63 (L) 441 - 2,156 cells/uL Final     Comment:     Effective 12/08/2014, the reference range for this assay has changed to   reflect   new methodology.     06/29/2015 141 (L) 441 - 2,156 cells/uL Final     Comment:     Effective 12/08/2014, the reference range for this assay has changed to   reflect   new methodology.     05/26/2015 72 (L) 441 - 2,156 cells/uL Final     Comment:     Effective 12/08/2014, the reference range for this assay has changed to   reflect   new methodology.     04/10/2015 26 (L) 441 - 2,156 cells/uL Final     Comment:     Effective 12/08/2014, the reference range for this assay has changed to   reflect   new methodology.         Inflammatory Markers    Recent Labs   Lab Test 10/08/18  0720 10/06/18  0658 10/05/18  0818 03/06/18  2105 12/28/16  1540 12/26/16  2213 12/18/15  0354 07/07/15  2205 05/25/15  2217   SED  --   --  23*  --   --   --   --  25* 37*   CRP <2.9 15.0* 14.0* 53.0* <2.9 <2.9 5.3 7.8 5.0       Metabolic Studies       Recent Labs   Lab Test 04/07/19  1031  01/07/19  0556 01/07/19  0217 01/06/19  1537 01/01/19  1822 12/19/18  0610 11/06/18  1055  10/06/18  0658 10/05/18  0818  09/09/18  0816  07/19/18  1147    139  --  136 132* 137 133   < > 137 138   < > 138   < >  --    POTASSIUM 3.2* 3.5 3.5 3.2* 3.2* 4.3 3.2*   < > 3.9 3.3*   < > 3.4   < >  --    CHLORIDE 99 108  --  104 99 106 101   < > 106 104   < > 105   < >  --    CO2 25 21  --  24 24 25 22   < > 25 24   < > 26   < >  --    ANIONGAP 11 10  --  8 10 6 9   < > 6 10   < > 7   < >  --    BUN 12 7  --  12 12 15 11   < > 12 13   < > 11   < >  --    CR 0.72 0.58*  --  0.65* 0.65* 0.82 0.86   < > 0.78 0.81   < > 0.78   < >  --    GFRESTIMATED >90 >90  --  >90 >90 >90 >90   < > >90 >90   < > >90   < >  --    * 221*  --  123* 234* 259* 360*   < > 172* 225*   < > 158*   < >  --    A1C  --   --   --   --   --   --   --   --   --   --   --   --   --  8.9*   LINDA 9.6 8.1*  --  9.0 8.2* 9.1 8.9   < > 8.0* 9.6   < > 9.1   < >  --    PHOS  --   --   --  2.7  --   --   --   --  2.8 3.9   < >  --   --   --    MAG  --   --   --  2.0  --   --   --   --  2.1  --    < > 2.3  --   --    LACT  --   --   --   --   --   --   --   --   --  1.1  --  1.1  --   --     < > = values in this interval not displayed.       Hematology Studies      Recent Labs   Lab Test 04/07/19  1031 01/07/19  0644 01/06/19  1537 01/01/19  1822 12/19/18  0635 12/19/18  0610 11/06/18  1055   WBC 11.2* 7.2 11.2* 11.1* 7.9 Unsatisfactory specimen - clotted 7.0   ANEU 8.3 4.1 7.8 8.1 5.2  --  4.7   ALYM 2.0 1.8 2.0 1.8 1.6  --  1.7   BRITT 0.6 0.6 0.8 0.9 0.6  --  0.4   AEOS 0.2 0.3 0.2 0.2 0.4  --  0.1   HGB 15.4 11.9* 13.2* 13.4 13.9 Unsatisfactory specimen - clotted 13.5   HCT 45.2 35.6* 37.6* 40.3 41.6 Unsatisfactory specimen - clotted 38.8*   * 437 427 326 427 Unsatisfactory specimen - clotted 249       Clotting Studies    Recent Labs   Lab Test 02/05/18  0901 01/01/18  0412 11/06/17  2346 05/26/17  1501  01/09/16  0550  04/09/15  2100   INR  1.06 0.98 0.91 1.00   < > 1.06   < > 1.04   PTT  --  32  --   --   --  30  --  32    < > = values in this interval not displayed.       Urine Studies     Recent Labs   Lab Test 12/19/18  0611 10/30/18  1810 10/05/18  0836 09/09/18  0923 07/03/18  1040 04/16/18  0645 02/04/18  1640   URINEPH 7.0 5.5 5.5 6.5 6.5 7.0 7.0   NITRITE Negative Negative Negative Negative Negative Negative Negative   LEUKEST Negative Negative Negative Negative Negative Negative Negative   WBCU 0 <1  --   --  <1 None 2       CSF testing     Recent Labs   Lab Test 05/10/16  1345   CWBC 4   CRBC 1   CGLU 92*   CTP 55       Microbiology:  Last 6 Culture results with specimen source  Culture Micro   Date Value Ref Range Status   10/04/2018 (A)  Final    Moderate growth  Staphylococcus aureus  This isolate DOES NOT demonstrate inducible clindamycin resistance in vitro. Clindamycin   is susceptible and could be used when indicated, however, erythromycin is resistant and   should not be used.     02/05/2018 Moderate growth  Escherichia coli   (A)  Final   02/05/2018 Heavy growth  Streptococcus anginosus   (A)  Final   02/05/2018 Heavy growth  Mixed gram positive sushma    Final   02/05/2018 (A)  Final    Heavy growth  Bacteroides fragilis group  Susceptibility testing not routinely done     02/05/2018 (A)  Final    Heavy growth  Bacteroides fragilis  Susceptibility testing not routinely done     02/05/2018 (A)  Final    Plus  Heavy growth  Mixed aerobic and anaerobic sushma     02/05/2018 Canceled, Test credited  Final   02/05/2018 Incorrectly ordered by PCU/Clinic  Final   02/05/2018 Test reordered as correct code  Final   02/05/2018 Culture negative after 4 weeks  Final    Specimen Description   Date Value Ref Range Status   11/06/2018 Urine  Final   10/08/2018 Feces  Final   10/07/2018 Feces  Final   10/06/2018 Feces  Final   10/06/2018 Feces  Final   10/06/2018 Feces  Final        Last check of C difficile  C Diff Toxin B PCR   Date Value Ref  Range Status   10/06/2018 Negative NEG^Negative Final     Comment:     Negative: Clostridium difficile target DNA sequences NOT detected, presumed   negative for Clostridium difficile toxin B or the number of bacteria present   may be below the limit of detection for the test.  FDA approved assay performed using [employer] GeneXpert real-time PCR.  A negative result does not exclude actual disease due to Clostridium difficile   and may be due to improper collection, handling and storage of the specimen   or the number of organisms in the specimen is below the detection limit of the   assay.         EBV Qualitative PCR   Date Value Ref Range Status   05/10/2016   Final    Not Detected  (Note)  NOT DETECTED - A negative result does not rule out the  presence of PCR inhibitors in the patient specimen or assay  specific nucleic acid in concentrations below the level of  detection by the assay.  INTERPRETIVE INFORMATION:  Hugo Barr Virus by PCR  Test developed and characteristics determined by [facility]. See Compliance Statement A: [facility]  Performed by [facility],  [address] [place] [id number] [phone]  [email or web address], Nayan Corley MD, Lab. Director       EBV PCR Specimen Source   Date Value Ref Range Status   05/10/2016 Cerebrospinal fluid  Final       Hepatitis B Testing     Recent Labs   Lab Test 04/13/15  0816   HBCAB Nonreactive   HEPBANG Nonreactive   HBCM Nonreactive   A nonreactive result suggests lack of recent exposure to the virus in the   preceding 6 months.          Hepatitis C Antibody   Date Value Ref Range Status   03/02/2017  NR Final    Nonreactive   Assay performance characteristics have not been established for newborns,   infants, and children     04/13/2015  NR Final    Nonreactive   Assay performance characteristics have not been established for newborns,   infants, and children         RSV Rapid Antigen Result   Date Value Ref Range Status   01/31/2018 Positive  (A) NEG^Negative Final     Comment:     Test results must be correlated with clinical data. If necessary, results   should be confirmed by a molecular assay or viral culture.         CMV Antibody IgG   Date Value Ref Range Status   04/12/2015 (H) 0.0 - 0.8 AI Final    >8.0  Positive   Antibody index (AI) values reflect qualitative changes in antibody   concentration that cannot be directly associated with clinical condition or   disease state.         Pathology:  Reviewed in Epic. No recent relevant results.      Imaging:  Results for orders placed or performed during the hospital encounter of 07/04/18   CT Abdomen Pelvis w Contrast    Narrative    Examination:  CT ABDOMEN PELVIS W CONTRAST 7/4/2018 7:30 AM     Comparison: 4/20/2018, 4/16/2018    History: abd pain, HIV - eval colitis;     Technique: Volumetric helical acquisition of CT images from the lung  bases through the symphysis pubis after the administration of IV  contrast. Coronal and sagittal images were reconstructed from the  source data.    Findings:    Lung bases:   4 mm nodule in the right lung on the first image. Heart size is  normal. No pleural or pericardial effusion.    Abdomen/pelvis:  Hepatic steatosis. Stable hypodensity in the anterior liver segment IV  along the falciform ligament likely representing focal fatty  deposition. Unchanged 8 mm probable cyst in the right lobe of the  liver. The liver is otherwise unremarkable. The spleen, kidneys,  adrenal glands, and pancreas appear normal. There are dilated loops of  small bowel in the midabdomen measuring up to 3.9 cm. Transition point  is noted on series 2, image 45. Bowel wall enhancement is normal.  There is no wall thickening. No pneumatosis, free fluid or free  intraperitoneal gas. The colon is unremarkable. The bladder is  predominantly decompressed with mild wall thickening, unchanged.  Partially visualized penile prosthesis. The major transabdominal  vasculature is patent and within  normal limits for caliber. No  intra-abdominal lymphadenopathy.    Bones:   No acute osseus abnormality or suspicious bony lesion.      Impression    Impression: Small bowel obstruction with transition point in the right  lower quadrant. Appearance is similar to, although less severe, than  4/16/2018.     [Result: Small bowel obstruction]    Finding was identified on 7/4/2018 8:12 AM.     Dr. Lehman was contacted by Dr. Smith at 7/4/2018 8:17 AM and  verbalized understanding of the urgent finding.     I have personally reviewed the examination and initial interpretation  and I agree with the findings.    MEGHNA LEWIS MD

## 2019-04-15 NOTE — LETTER
4/15/2019      RE: Andrew Lockwood  [address]  [facility] [place] [address]       [facility]  Infectious Disease Clinic Note     Patient:  Andrew Lockwood, Date of birth 11/27/1967, Medical record number [id number]  Date of Visit:  04/15/2019           Assessment and Plan:     Plan:  - continue Biktarvy  - CBC with Diff, CMP, HIV-1 RNA, T cell subsets    -If labs are stable, patient can return to care in 3 months.    -Encouraged a dental visit.    -Patient needs follow-up with colorectal surgery for hx of LSIL on anal pap, but has no showed for multiple visits.    -Diabetes care per endocrinology.    Assessment:    Infectious Disease issues include:    - HIV/AIDS: Mr. Lockwood was diagnosed with HIV/AIDS in 2015 when he presented with toxoplasmosis. He has had difficulty with adherence and previously been treated with Triumeq and Genvoya.  He complained of food insecurity and wanted a regimen that did not need to be taken with food. He was switched to Biktarvy on 7/19/2018 and reports excellent adherence since that time. He does not note any adverse effects from Biktarvy, and he is not noting any associated GI upset.   He now has stable housing since ~ January 1st 2019.      - STI risk:   Mr. Lockwood reports that his partner will be moving in with him soon.  He denies any sexual activity and tells me that he has not had any sex in over a year.  However, his partner has Hepatitis C and a history of IVDU with heroin.  The partner is currently on suboxone and sober for 4 months.  Patient declined any STI screening today, including testing for Hepatitis C.   Would continue to discuss this at future visits.    Preventative health care  Cardiovascular Juan Jose -               Lipids - CHOL 180,  (8/1 5/2018)               Blood Pressure -  BP  124/81 today   Endocrine: DMII. Followed by endocrine.  Cancer Screening              Colon - Up to date. [facility]              Anal - Anal pap with  "colorectal 6/22/2018 with LSIL. Patient needs to follow up with colorectal for this, but has no-showed for multiple visits.  Immunizations              Hepatitis A - immune, serology 4/13/15              Hepatitis B - 6/16/2016, 3/10/2016, 8/31/2017              Influenza - not documented for 2018/2019 season              PCV 13 - 3/10/2016   Pneumovax - 6/16/2016              Tdap - 10/17/2016              Menactra - 8/31/2015 and 3/10/2016  Renal Health -  UA without proteinuria 12/19/2018 and creatinine normal last 4/7/2019  Sexually Transmitted Infection Risk and Screening              Gonorrhea and Chlamydia - GC/Chlamydia negative on 2/22/18, declined testing today, he has a boyfriend but says they are not sexually active.               Syphilis - negative 11/2018  Tobacco abuse: patient working on quitting and down to several cigarettes per day.       Ayala Prieto MD, MS  Infectious Disease      I spent 40 minutes in direct care with this patient, including >50% of time face-to-face with the patient counseling about STI risk, importance of adherence to HIV medications, and the importance of engagement in care for Diabetes.            History of the Infectious Disease Illness:     Mr. Lockwood is a 53 year old man with HIV previously followed by Dr. Rosado since his diagnosis in 2015.  She left the practice, and he was last seen in this clinic 8/15/2018 by Dr. Carbajal.      The biggest change in his life in the interim is that he now has stable housing since January 2019.  Prior to that, he had multiple years of homelessness.  Most recently, he was in the \"[facility] in [place].    He reports close and easy adherence to Biktarvy.  He was switched in 7/2018 to this regimen because he wanted something that did not require food.  He is very happy with this regimen and reports no adverse effects.     His partner is planning on moving in with him very soon.  Mr. Lockwood reports that his partner was an injection " drug user with heroin, but is now on suboxone and sober for the past 4 months.  Mr. Lockwood also reports that his partner has Hepatitis C infection, and a history of endocarditis that required surgery.    Review of Systems:  CONSTITUTIONAL:  No fevers or chills. No night sweats.  EYES: negative for icterus  ENT:  negative for hearing loss, tinnitus or sore throat  RESPIRATORY:  negative for cough, sputum, dyspnea  CARDIOVASCULAR:  negative for chest pain, palpitations  GASTROINTESTINAL:  Positive for recent nausea/vomiting (seen in the ED 4/7/2019, no concerning findings), now resolved  GENITOURINARY:  negative for dysuria  HEME:  no easy bruising  INTEGUMENT: negative for rash and pruritis.  NEURO:  negative for headache    Past Medical History:   Diagnosis Date     AIDS (H)      Allergic rhinitis due to other allergen     DNS     Chronic abdominal pain      CNS toxoplasmosis (H)      Diabetes type 2, controlled (H)      GERD (gastroesophageal reflux disease)      HIV (human immunodeficiency virus infection) (H)      Periungual wart      Sleep apnea     doesn't use CPAP       HIV History  Date of Diagnosis: 4/2015  Approximated time of transmission:2008  CD4 Josue: 25  Viral Load at Diagnosis: 171,654  Opportunistic Infections: Toxoplasmosis  CMV Status: IgG+ (4/12/15)  Toxo Status: + (4/12/15), Had CNS toxoplasmosis as his primary illness at the time of diagnosis  HLA  Status: 4/25/15 negative  Tuberculosis Screening: Exposure to a friend for treated for active TB several years ago. They did not live together.  Historical use of ARVs: Triumeq, Genvoya  Historical Resistance Mutations: Susceptible      Past Surgical History:   Procedure Laterality Date     C NONSPECIFIC PROCEDURE      right forearm fracture     COLONOSCOPY Left 1/22/2016    Procedure: COMBINED COLONOSCOPY, SINGLE OR MULTIPLE BIOPSY/POLYPECTOMY BY BIOPSY;  Surgeon: Clark Saini MD;  Location: [facility]  TESTIS,INGUIN/SCROTAL       LAPAROSCOPIC APPENDECTOMY N/A 2018    Procedure: LAPAROSCOPIC APPENDECTOMY;  LAPAROSCOPIC APPENDECTOMY;  Surgeon: Dawn Holt MD;  Location: [facility] OR     LAPAROSCOPY DIAGNOSTIC (GENERAL) N/A 2016    Procedure: LAPAROSCOPY DIAGNOSTIC (GENERAL);  Surgeon: Susannah Arriaga MD;  Location: [facility] OR     LAPAROSCOPY DIAGNOSTIC (GENERAL) N/A 2018    Procedure: LAPAROSCOPY DIAGNOSTIC (GENERAL);  Diagnostic laparoscopy and lysis of adhesions;  Surgeon: Prince Dowling MD;  Location: [facility] OR     OPTICAL TRACKING SYSTEM CRANIOTOMY, EXCISE TUMOR, COMBINED Left 4/10/2015    Procedure: COMBINED OPTICAL TRACKING SYSTEM CRANIOTOMY, EXCISE TUMOR;  Surgeon: Mirlande Colmenares MD;  Location: [facility] OR     REPAIR GAMEKEEPER'S THUMB Right 2016    Procedure: REPAIR LIGAMENT ULNAR COLLATERAL THUMB (GAMEKEEPER'S);  Surgeon: Evin Zamorano MD;  Location: [facility] OR       Family History   Problem Relation Age of Onset     Diabetes Brother      Diabetes Father      Alzheimer Disease Father      Unknown/Adopted Mother      Diabetes Paternal Grandfather      Cancer No family hx of         no skin cancer     Skin Cancer No family hx of         no famiy hx of skin cancer     Glaucoma No family hx of      Macular Degeneration No family hx of        Social History     Social History Narrative    Born in [facility].  [place] to the [place] in .  Last traveled to visit family in .        2019 - Homeless. Working with a  at [employer] trying to get housing. Sexually active with two partners recently using condoms 100% of the time. Smokes occasionally, not for the last 4 weeks.     Social History     Tobacco Use     Smoking status: Current Some Day Smoker     Packs/day: 0.25     Types: Cigarettes     Last attempt to quit: 10/28/2016     Years since quittin.4     Smokeless tobacco: Former User   Substance Use Topics     Alcohol use: No     Alcohol/week: 0.0 oz      "Comment: Last etoh in 2007     Drug use: No     Types: Marijuana     Comment: \"Not very often\"       Immunization History   Administered Date(s) Administered     HepB 03/10/2016, 06/16/2016     HepB-Adult 08/31/2017     Influenza (IIV3) PF 10/17/2016     Influenza Vaccine IM 3yrs+ 4 Valent IIV4 12/22/2015, 12/14/2017     Mantoux Tuberculin Skin Test 04/12/2015     Meningococcal (Menactra ) 08/31/2015, 03/10/2016     Pneumo Conj 13-V (2010&after) 03/10/2016     Pneumococcal 23 valent 06/16/2016     TDAP Vaccine (Boostrix) 10/17/2016       Patient Active Problem List   Diagnosis     Allergic rhinitis due to other allergen     Brain lesion     Toxoplasma encephalitis (H)     Pulmonary nodules     Human immunodeficiency virus I infection (H)     Folliculitis     Prurigo nodularis     Epidermal cyst     Shoulder joint pain, unspecified laterality     CNS toxoplasmosis (H)     Constipation     Non-intractable vomiting with nausea, vomiting of unspecified type     Thrush     Insomnia, unspecified insomnia     Bowel obstruction (H)     Toxoplasmosis     Gastroesophageal reflux disease     Headache     Aphthous ulcer     Type 2 diabetes mellitus (H)     Small bowel obstruction (H)     SBO (small bowel obstruction) (H)     Slow transit constipation     Periungual wart     Herpes zoster     Erectile dysfunction, unspecified erectile dysfunction type     Insomnia, unspecified type     Preventative health care     Pain of right thumb     Rupture of ulnar collateral ligament of right thumb     Aftercare following surgery of the musculoskeletal system     Panic disorder     Generalized anxiety disorder     Major depressive disorder, single episode, unspecified     Abdominal pain     Acute appendicitis with localized peritonitis     S/P appendectomy     Abdominal abscess     Appendicitis     Otitis media     Horseshoe tear of retina of left eye without detachment     Adynamic ileus (H)     Pneumonia       Current Outpatient " "Medications   Medication Sig     BASAGLAR 100 UNIT/ML injection Inject 20 Units Subcutaneous daily (Patient taking differently: Inject 50 Units Subcutaneous daily )     Bictegravir-Emtricitab-Tenofov -25 MG TABS Take 1 tablet by mouth daily     blood glucose (NO BRAND SPECIFIED) lancets standard Use to test blood sugar four times daily or as directed.     blood glucose monitoring (NO BRAND SPECIFIED) meter device kit Use to test blood sugar 4 times daily or as directed.     blood glucose monitoring (NO BRAND SPECIFIED) test strip 1 strip by In Vitro route 4 times daily (before meals and nightly) Use to test blood sugars 4 times daily or as directed     empagliflozin (JARDIANCE) 10 MG TABS tablet Take 1 tablet by mouth daily (Patient not taking: Reported on 4/15/2019)     insulin pen needle (B-D U/F) 31G X 8 MM Use 1 pen needles daily or as directed. (Patient not taking: Reported on 4/15/2019)     omeprazole (PRILOSEC) 20 MG CR capsule Take 1 capsule (20 mg) by mouth 2 times daily (Patient not taking: Reported on 4/15/2019)     ranitidine (ZANTAC) 150 MG tablet Take 1 tablet (150 mg) by mouth 2 times daily (Patient not taking: Reported on 4/15/2019)     No current facility-administered medications for this visit.        Allergies   Allergen Reactions     Metformin      Abdominal pain     Milk [Lac Bovis] Hives     Tylenol [Acetaminophen] Itching     Dulaglutide Rash              Physical Exam:   Vitals were reviewed.  All vitals stable  /81 (BP Location: Right arm, Patient Position: Sitting, Cuff Size: Adult Regular)   Pulse 106   Temp 98.4  F (36.9  C) (Oral)   Resp 16   Ht 1.626 m (5' 4\")   Wt 74.8 kg (165 lb)   SpO2 96%   BMI 28.32 kg/m       Exam:  GENERAL:  well-developed, well-nourished, alert, oriented, in no acute distress.  HEENT:  Head is normocephalic, atraumatic   EYES:  Eyes have anicteric sclerae.    ENT:  Oropharynx is moist without exudates or ulcers.  Poor dentition  NECK:  " Supple.  LUNGS:  Clear to auscultation.  CARDIOVASCULAR:  Regular rate and rhythm with no murmurs, gallops or rubs.  ABDOMEN:  Normal bowel sounds, soft, nontender.  SKIN: Multiple excoriated papules on extremities without surrounding erythema or discharge.  NEUROLOGIC:  Grossly nonfocal.         Laboratory Data:     Absolute CD4   Date Value Ref Range Status   11/06/2018 235 (L) 441 - 2,156 cells/uL Final   08/15/2018 317 (L) 441 - 2,156 cells/uL Final   07/19/2018 299 (L) 441 - 2,156 cells/uL Final   04/26/2018 299 (L) 441 - 2,156 cells/uL Final   03/07/2018 256 (L) 441 - 2,156 cells/uL Final   12/12/2017 225 (L) 441 - 2,156 cells/uL Final   08/11/2017 247 (L) 441 - 2,156 cells/uL Final   02/22/2017 183 (L) 441 - 2,156 cells/uL Final   11/07/2016 200 (L) 441 - 2,156 cells/uL Final   08/04/2016 181 (L) 441 - 2,156 cells/uL Final   05/20/2016 94 (L) 441 - 2,156 cells/uL Final   02/25/2016 56 (L) 441 - 2,156 cells/uL Final   01/10/2016 25 (L) 441 - 2,156 cells/uL Final   11/05/2015 31 (L) 441 - 2,156 cells/uL Final   10/06/2015 63 (L) 441 - 2,156 cells/uL Final     Comment:     Effective 12/08/2014, the reference range for this assay has changed to   reflect   new methodology.     06/29/2015 141 (L) 441 - 2,156 cells/uL Final     Comment:     Effective 12/08/2014, the reference range for this assay has changed to   reflect   new methodology.     05/26/2015 72 (L) 441 - 2,156 cells/uL Final     Comment:     Effective 12/08/2014, the reference range for this assay has changed to   reflect   new methodology.     04/10/2015 26 (L) 441 - 2,156 cells/uL Final     Comment:     Effective 12/08/2014, the reference range for this assay has changed to   reflect   new methodology.         Inflammatory Markers    Recent Labs   Lab Test 10/08/18  0720 10/06/18  0658 10/05/18  0818 03/06/18  2105 12/28/16  1540 12/26/16  2213 12/18/15  0354 07/07/15  2205 05/25/15  2217   SED  --   --  23*  --   --   --   --  25* 37*   CRP <2.9  15.0* 14.0* 53.0* <2.9 <2.9 5.3 7.8 5.0       Metabolic Studies       Recent Labs   Lab Test 04/07/19  1031 01/07/19  0556 01/07/19  0217 01/06/19  1537 01/01/19  1822 12/19/18  0610 11/06/18  1055  10/06/18  0658 10/05/18  0818  09/09/18  0816  07/19/18  1147    139  --  136 132* 137 133   < > 137 138   < > 138   < >  --    POTASSIUM 3.2* 3.5 3.5 3.2* 3.2* 4.3 3.2*   < > 3.9 3.3*   < > 3.4   < >  --    CHLORIDE 99 108  --  104 99 106 101   < > 106 104   < > 105   < >  --    CO2 25 21  --  24 24 25 22   < > 25 24   < > 26   < >  --    ANIONGAP 11 10  --  8 10 6 9   < > 6 10   < > 7   < >  --    BUN 12 7  --  12 12 15 11   < > 12 13   < > 11   < >  --    CR 0.72 0.58*  --  0.65* 0.65* 0.82 0.86   < > 0.78 0.81   < > 0.78   < >  --    GFRESTIMATED >90 >90  --  >90 >90 >90 >90   < > >90 >90   < > >90   < >  --    * 221*  --  123* 234* 259* 360*   < > 172* 225*   < > 158*   < >  --    A1C  --   --   --   --   --   --   --   --   --   --   --   --   --  8.9*   LINDA 9.6 8.1*  --  9.0 8.2* 9.1 8.9   < > 8.0* 9.6   < > 9.1   < >  --    PHOS  --   --   --  2.7  --   --   --   --  2.8 3.9   < >  --   --   --    MAG  --   --   --  2.0  --   --   --   --  2.1  --    < > 2.3  --   --    LACT  --   --   --   --   --   --   --   --   --  1.1  --  1.1  --   --     < > = values in this interval not displayed.       Hematology Studies      Recent Labs   Lab Test 04/07/19  1031 01/07/19  0644 01/06/19  1537 01/01/19  1822 12/19/18  0635 12/19/18  0610 11/06/18  1055   WBC 11.2* 7.2 11.2* 11.1* 7.9 Unsatisfactory specimen - clotted 7.0   ANEU 8.3 4.1 7.8 8.1 5.2  --  4.7   ALYM 2.0 1.8 2.0 1.8 1.6  --  1.7   BRITT 0.6 0.6 0.8 0.9 0.6  --  0.4   AEOS 0.2 0.3 0.2 0.2 0.4  --  0.1   HGB 15.4 11.9* 13.2* 13.4 13.9 Unsatisfactory specimen - clotted 13.5   HCT 45.2 35.6* 37.6* 40.3 41.6 Unsatisfactory specimen - clotted 38.8*   * 437 427 326 427 Unsatisfactory specimen - clotted 249       Clotting Studies    Recent Labs    Lab Test 02/05/18  0901 01/01/18  0412 11/06/17  2346 05/26/17  1501  01/09/16  0550  04/09/15  2100   INR 1.06 0.98 0.91 1.00   < > 1.06   < > 1.04   PTT  --  32  --   --   --  30  --  32    < > = values in this interval not displayed.       Urine Studies     Recent Labs   Lab Test 12/19/18  0611 10/30/18  1810 10/05/18  0836 09/09/18  0923 07/03/18  1040 04/16/18  0645 02/04/18  1640   URINEPH 7.0 5.5 5.5 6.5 6.5 7.0 7.0   NITRITE Negative Negative Negative Negative Negative Negative Negative   LEUKEST Negative Negative Negative Negative Negative Negative Negative   WBCU 0 <1  --   --  <1 None 2       CSF testing     Recent Labs   Lab Test 05/10/16  1345   CWBC 4   CRBC 1   CGLU 92*   CTP 55       Microbiology:  Last 6 Culture results with specimen source  Culture Micro   Date Value Ref Range Status   10/04/2018 (A)  Final    Moderate growth  Staphylococcus aureus  This isolate DOES NOT demonstrate inducible clindamycin resistance in vitro. Clindamycin   is susceptible and could be used when indicated, however, erythromycin is resistant and   should not be used.     02/05/2018 Moderate growth  Escherichia coli   (A)  Final   02/05/2018 Heavy growth  Streptococcus anginosus   (A)  Final   02/05/2018 Heavy growth  Mixed gram positive sushma    Final   02/05/2018 (A)  Final    Heavy growth  Bacteroides fragilis group  Susceptibility testing not routinely done     02/05/2018 (A)  Final    Heavy growth  Bacteroides fragilis  Susceptibility testing not routinely done     02/05/2018 (A)  Final    Plus  Heavy growth  Mixed aerobic and anaerobic sushma     02/05/2018 Canceled, Test credited  Final   02/05/2018 Incorrectly ordered by PCU/Clinic  Final   02/05/2018 Test reordered as correct code  Final   02/05/2018 Culture negative after 4 weeks  Final    Specimen Description   Date Value Ref Range Status   11/06/2018 Urine  Final   10/08/2018 Feces  Final   10/07/2018 Feces  Final   10/06/2018 Feces  Final   10/06/2018  Feces  Final   10/06/2018 Feces  Final        Last check of C difficile  C Diff Toxin B PCR   Date Value Ref Range Status   10/06/2018 Negative NEG^Negative Final     Comment:     Negative: Clostridium difficile target DNA sequences NOT detected, presumed   negative for Clostridium difficile toxin B or the number of bacteria present   may be below the limit of detection for the test.  FDA approved assay performed using [employer] GeneXpert real-time PCR.  A negative result does not exclude actual disease due to Clostridium difficile   and may be due to improper collection, handling and storage of the specimen   or the number of organisms in the specimen is below the detection limit of the   assay.         EBV Qualitative PCR   Date Value Ref Range Status   05/10/2016   Final    Not Detected  (Note)  NOT DETECTED - A negative result does not rule out the  presence of PCR inhibitors in the patient specimen or assay  specific nucleic acid in concentrations below the level of  detection by the assay.  INTERPRETIVE INFORMATION:  Hugo Barr Virus by PCR  Test developed and characteristics determined by [facility]. See Compliance Statement A: [employer]/  Performed by [facility],  [address] [place] [address] [phone]  [email or web address], Nayan Corley MD, Lab. Director       EBV PCR Specimen Source   Date Value Ref Range Status   05/10/2016 Cerebrospinal fluid  Final       Hepatitis B Testing     Recent Labs   Lab Test 04/13/15  0816   HBCAB Nonreactive   HEPBANG Nonreactive   HBCM Nonreactive   A nonreactive result suggests lack of recent exposure to the virus in the   preceding 6 months.          Hepatitis C Antibody   Date Value Ref Range Status   03/02/2017  NR Final    Nonreactive   Assay performance characteristics have not been established for newborns,   infants, and children     04/13/2015  NR Final    Nonreactive   Assay performance characteristics have not been established for newborns,    infants, and children         RSV Rapid Antigen Result   Date Value Ref Range Status   01/31/2018 Positive (A) NEG^Negative Final     Comment:     Test results must be correlated with clinical data. If necessary, results   should be confirmed by a molecular assay or viral culture.         CMV Antibody IgG   Date Value Ref Range Status   04/12/2015 (H) 0.0 - 0.8 AI Final    >8.0  Positive   Antibody index (AI) values reflect qualitative changes in antibody   concentration that cannot be directly associated with clinical condition or   disease state.         Pathology:  Reviewed in Epic. No recent relevant results.      Imaging:  Results for orders placed or performed during the hospital encounter of 07/04/18   CT Abdomen Pelvis w Contrast    Narrative    Examination:  CT ABDOMEN PELVIS W CONTRAST 7/4/2018 7:30 AM     Comparison: 4/20/2018, 4/16/2018    History: abd pain, HIV - eval colitis;     Technique: Volumetric helical acquisition of CT images from the lung  bases through the symphysis pubis after the administration of IV  contrast. Coronal and sagittal images were reconstructed from the  source data.    Findings:    Lung bases:   4 mm nodule in the right lung on the first image. Heart size is  normal. No pleural or pericardial effusion.    Abdomen/pelvis:  Hepatic steatosis. Stable hypodensity in the anterior liver segment IV  along the falciform ligament likely representing focal fatty  deposition. Unchanged 8 mm probable cyst in the right lobe of the  liver. The liver is otherwise unremarkable. The spleen, kidneys,  adrenal glands, and pancreas appear normal. There are dilated loops of  small bowel in the midabdomen measuring up to 3.9 cm. Transition point  is noted on series 2, image 45. Bowel wall enhancement is normal.  There is no wall thickening. No pneumatosis, free fluid or free  intraperitoneal gas. The colon is unremarkable. The bladder is  predominantly decompressed with mild wall thickening,  unchanged.  Partially visualized penile prosthesis. The major transabdominal  vasculature is patent and within normal limits for caliber. No  intra-abdominal lymphadenopathy.    Bones:   No acute osseus abnormality or suspicious bony lesion.      Impression    Impression: Small bowel obstruction with transition point in the right  lower quadrant. Appearance is similar to, although less severe, than  4/16/2018.     [Result: Small bowel obstruction]    Finding was identified on 7/4/2018 8:12 AM.     Dr. Lehman was contacted by Dr. Smith at 7/4/2018 8:17 AM and  verbalized understanding of the urgent finding.     I have personally reviewed the examination and initial interpretation  and I agree with the findings.    MD Ayala VILLEGAS MD

## 2019-04-16 ENCOUNTER — TELEPHONE (OUTPATIENT)
Dept: ENDOCRINOLOGY | Facility: CLINIC | Age: 54
End: 2019-04-16

## 2019-04-16 LAB
CD3 CELLS # BLD: 1386 CELLS/UL (ref 603–2990)
CD3 CELLS NFR BLD: 54 % (ref 49–84)
CD3+CD4+ CELLS # BLD: 394 CELLS/UL (ref 441–2156)
CD3+CD4+ CELLS NFR BLD: 15 % (ref 28–63)
CD3+CD4+ CELLS/CD3+CD8+ CLL BLD: 0.42 % (ref 1.4–2.6)
CD3+CD8+ CELLS # BLD: 927 CELLS/UL (ref 125–1312)
CD3+CD8+ CELLS NFR BLD: 36 % (ref 10–40)
HIV1 RNA # PLAS NAA DL=20: 28 {COPIES}/ML
HIV1 RNA SERPL NAA+PROBE-LOG#: 1.4 {LOG_COPIES}/ML
IFC SPECIMEN: ABNORMAL

## 2019-04-17 RX ORDER — INSULIN GLARGINE 100 [IU]/ML
50 INJECTION, SOLUTION SUBCUTANEOUS DAILY
Qty: 15 ML | Refills: 0 | Status: SHIPPED | OUTPATIENT
Start: 2019-04-17 | End: 2019-05-23

## 2019-05-13 ENCOUNTER — TELEPHONE (OUTPATIENT)
Dept: INFECTIOUS DISEASES | Facility: CLINIC | Age: 54
End: 2019-05-13

## 2019-05-23 DIAGNOSIS — E11.65 TYPE 2 DIABETES MELLITUS WITH HYPERGLYCEMIA, WITHOUT LONG-TERM CURRENT USE OF INSULIN (H): ICD-10-CM

## 2019-05-23 RX ORDER — INSULIN GLARGINE 100 [IU]/ML
50 INJECTION, SOLUTION SUBCUTANEOUS DAILY
Qty: 15 ML | Refills: 0 | Status: SHIPPED | OUTPATIENT
Start: 2019-05-23 | End: 2019-06-07

## 2019-05-23 NOTE — TELEPHONE ENCOUNTER
It has been nearly one year since last seen and he was in grave need of DM education and care coordination at that time, if willing to engage.  Please offer f/u with self, CDE or PCP within the month.  If transitioning to PCP, they should take over diabetes management.

## 2019-05-23 NOTE — TELEPHONE ENCOUNTER
insulin glargine (BASAGLAR KWIKPEN) 100 UNIT/ML pen      Last Written Prescription Date:  4-17-19  Last Fill Quantity: 15 ml,   # refills: 0  Last Office Visit : 6-4-18  Future Office visit:  0    Routing refill request to provider for review/approval because:  Insulin - refilled per clinic  Overdue lab    Scheduling has been notified to contact the pt for appointment.

## 2019-06-02 ENCOUNTER — HOSPITAL ENCOUNTER (EMERGENCY)
Facility: CLINIC | Age: 54
Discharge: HOME | End: 2019-06-02
Attending: EMERGENCY MEDICINE | Admitting: EMERGENCY MEDICINE
Payer: COMMERCIAL

## 2019-06-02 VITALS
SYSTOLIC BLOOD PRESSURE: 141 MMHG | RESPIRATION RATE: 18 BRPM | WEIGHT: 170 LBS | HEIGHT: 64 IN | TEMPERATURE: 98.4 F | DIASTOLIC BLOOD PRESSURE: 96 MMHG | BODY MASS INDEX: 29.02 KG/M2 | OXYGEN SATURATION: 95 % | HEART RATE: 88 BPM

## 2019-06-02 DIAGNOSIS — L03.032 CELLULITIS OF TOE OF LEFT FOOT: ICD-10-CM

## 2019-06-02 DIAGNOSIS — Z21 HUMAN IMMUNODEFICIENCY VIRUS I INFECTION (H): ICD-10-CM

## 2019-06-02 PROCEDURE — 99284 EMERGENCY DEPT VISIT MOD MDM: CPT | Mod: Z6 | Performed by: EMERGENCY MEDICINE

## 2019-06-02 PROCEDURE — 99282 EMERGENCY DEPT VISIT SF MDM: CPT | Performed by: EMERGENCY MEDICINE

## 2019-06-02 RX ORDER — CEPHALEXIN 500 MG/1
500 CAPSULE ORAL 4 TIMES DAILY
Qty: 28 CAPSULE | Refills: 0 | Status: SHIPPED | OUTPATIENT
Start: 2019-06-02 | End: 2019-06-04

## 2019-06-02 ASSESSMENT — MIFFLIN-ST. JEOR: SCORE: 1527.11

## 2019-06-02 NOTE — ED AVS SNAPSHOT
[facility], [place], Emergency Department  [address] [place] [id number]  Phone:  [phone]                                    Andrew Lockwood   MRN: [id number]    Department:  [facility], Emergency Department   Date of Visit:  6/2/2019           After Visit Summary Signature Page    I have received my discharge instructions, and my questions have been answered. I have discussed any challenges I see with this plan with the nurse or doctor.    ..........................................................................................................................................  Patient/Patient Representative Signature      ..........................................................................................................................................  Patient Representative Print Name and Relationship to Patient    ..................................................               ................................................  Date                                   Time    ..........................................................................................................................................  Reviewed by Signature/Title    ...................................................              ..............................................  Date                                               Time          22EPIC Rev 08/18

## 2019-06-03 NOTE — ED PROVIDER NOTES
"  History     Chief Complaint   Patient presents with     Toe Pain     HPI  Andrew Lockwood is a 51 year old male with a history of HIV who presents to the Emergency Department today for evaluation of left 2nd toe pain. The patient states that he has been having a lot of pain and a wound on the tip of his left 2nd toe. He reports that he has taken antibiotics in the past for this but this did not help.  He also attempted nail polish with letter, however this is not helped either.  Denies any fevers.    I have reviewed the Medications, Allergies, Past Medical and Surgical History, and Social History in the Epic system.    Review of Systems  ROS: 14 point ROS neg other than the symptoms noted above in the HPI.   Physical Exam   BP: (!) 129/94  Pulse: 111  Temp: 98.4  F (36.9  C)  Resp: 18  Height: 162.6 cm (5' 4\")  Weight: 77.1 kg (170 lb)  SpO2: 97 %    Physical Exam  GEN: Well appearing, non toxic, cooperative.   HEENT: The head is normocephalic and atraumatic. Pupils are equal round and reactive to light. Extraocular motions are intact. There is no facial swelling. The neck is nontender and supple.   CV: Regular rate and rhythm without murmurs rubs or gallops. 2+ radial pulses bilaterally.  PULM: Clear to auscultation bilaterally.  ABD: Soft, nontender, nondistended.   EXT: Full range of motion.  No edema.  Second digit of left toe with mild erythema and warmth on the distal tip.  No area of fluctuance.  NEURO: Cranial nerves II through XII are intact and symmetric. Bilateral upper and lower extremities grossly show full range of motion without any focal deficits.   SKIN: No rashes, ecchymosis, or lacerations  PSYCH: Calm and cooperative, interactive.    ED Course   8:08 PM  The patient was seen and examined by Dr. Wolff in [facility]       Procedures             Critical Care time:  none             Labs Ordered and Resulted from Time of ED Arrival Up to the Time of Departure from the ED - No data to display     "     Assessments & Plan (with Medical Decision Making)   Patient is a 51-year-old male with a history of HIV who presents with pain and redness to the second digit of his left toe.  Initial vitals were within normal limits, patient afebrile.  Exam as above and concerning for possible cellulitis.  Patient seems to have chronic issues with his fingers and toes.  Patient has reassuring exam and concerns for possible synovitis, patient was discharged home with prescription for Keflex.  He was advised to follow-up with his primary doctor and also given strict return precautions for any worsening symptoms.    I have reviewed the nursing notes.    I have reviewed the findings, diagnosis, plan and need for follow up with the patient.       Medication List      Started    cephALEXin 500 MG capsule  Commonly known as:  KEFLEX  500 mg, Oral, 4 TIMES DAILY            Final diagnoses:   Cellulitis of toe of left foot   IChristiano, am serving as a trained medical scribe to document services personally performed by Jose Wolff MD, based on the provider's statements to me.   Jose KELLY MD, was physically present and have reviewed and verified the accuracy of this note documented by Christiano Velazquez.     6/2/2019   [facility], [place], EMERGENCY DEPARTMENT     Jose Wolff MD  06/02/19 8014

## 2019-06-03 NOTE — ED TRIAGE NOTES
Pt presents to ED with c/o toe pain on L foot. Pt states he has a fungal infection and took antibiotics but they didn't work.

## 2019-06-04 RX ORDER — CEPHALEXIN 500 MG/1
500 CAPSULE ORAL 4 TIMES DAILY
Qty: 28 CAPSULE | Refills: 0 | Status: SHIPPED | OUTPATIENT
Start: 2019-06-04 | End: 2019-08-22

## 2019-06-05 ENCOUNTER — HOSPITAL ENCOUNTER (EMERGENCY)
Facility: CLINIC | Age: 54
Discharge: HOME | End: 2019-06-05
Attending: EMERGENCY MEDICINE | Admitting: EMERGENCY MEDICINE
Payer: COMMERCIAL

## 2019-06-05 ENCOUNTER — TELEPHONE (OUTPATIENT)
Dept: INTERNAL MEDICINE | Facility: CLINIC | Age: 54
End: 2019-06-05

## 2019-06-05 VITALS
OXYGEN SATURATION: 100 % | RESPIRATION RATE: 16 BRPM | TEMPERATURE: 98.8 F | DIASTOLIC BLOOD PRESSURE: 93 MMHG | WEIGHT: 170 LBS | HEART RATE: 108 BPM | HEIGHT: 64 IN | BODY MASS INDEX: 29.02 KG/M2 | SYSTOLIC BLOOD PRESSURE: 129 MMHG

## 2019-06-05 DIAGNOSIS — L03.032 PARONYCHIA OF TOE, LEFT: ICD-10-CM

## 2019-06-05 DIAGNOSIS — E11.8 TYPE 2 DIABETES MELLITUS WITH COMPLICATION, WITHOUT LONG-TERM CURRENT USE OF INSULIN (H): ICD-10-CM

## 2019-06-05 DIAGNOSIS — E11.9 TYPE 2 DIABETES MELLITUS (H): Primary | ICD-10-CM

## 2019-06-05 DIAGNOSIS — E11.9 TYPE 2 DIABETES MELLITUS WITHOUT COMPLICATION, WITHOUT LONG-TERM CURRENT USE OF INSULIN (H): ICD-10-CM

## 2019-06-05 DIAGNOSIS — B20 HUMAN IMMUNODEFICIENCY VIRUS (HIV) DISEASE (H): Primary | ICD-10-CM

## 2019-06-05 DIAGNOSIS — E11.9 TYPE 2 DIABETES MELLITUS (H): ICD-10-CM

## 2019-06-05 DIAGNOSIS — M79.675 PAIN OF TOE OF LEFT FOOT: ICD-10-CM

## 2019-06-05 LAB — GLUCOSE SERPL-MCNC: 323 MG/DL (ref 70–99)

## 2019-06-05 PROCEDURE — 10060 I&D ABSCESS SIMPLE/SINGLE: CPT | Performed by: EMERGENCY MEDICINE

## 2019-06-05 PROCEDURE — 10060 I&D ABSCESS SIMPLE/SINGLE: CPT | Mod: Z6 | Performed by: EMERGENCY MEDICINE

## 2019-06-05 PROCEDURE — 99283 EMERGENCY DEPT VISIT LOW MDM: CPT | Mod: 25 | Performed by: EMERGENCY MEDICINE

## 2019-06-05 ASSESSMENT — ENCOUNTER SYMPTOMS
DIFFICULTY URINATING: 0
NECK STIFFNESS: 0
COLOR CHANGE: 0
ARTHRALGIAS: 0
CONFUSION: 0
EYE REDNESS: 0
ABDOMINAL PAIN: 0
HEADACHES: 0
FEVER: 0
SHORTNESS OF BREATH: 0

## 2019-06-05 ASSESSMENT — MIFFLIN-ST. JEOR: SCORE: 1527.11

## 2019-06-05 NOTE — ED TRIAGE NOTES
Pt c/o L toe pain. States multiple toes hurt and wants his toe nails taken off. He was prescribed antibiotics 2 days ago.

## 2019-06-05 NOTE — ED PROVIDER NOTES
History     Chief Complaint   Patient presents with     Toe Pain     HPI  Andrew Lockwood is a homeless 51 year old male with a history of HIV and DM II who presents for evaluation of left toe pain. Patient reports a part of his nail on his left 2nd toe fell off Wednesday, 7 days ago, and he has since had pain from his toe that travels up his foot. He states his pain is getting worse and notes his foot has been increasingly red as well. Patient denies fevers or chills. He reports a history of similar nail pain whenever homeless and he is currently homeless. Patient reports was seen a few days ago however forgot his antibiotics - picked them up and started yesterday.      Per chart review, patient was seen here for this same issue on 6/2/19. He was discharged home with a prescription for Keflex as treatment for possible cellulitis. Patient reports he started Keflex yesterday.    Past Medical History:   Diagnosis Date     AIDS (H)      Allergic rhinitis due to other allergen     DNS     Chronic abdominal pain      CNS toxoplasmosis (H)      Diabetes type 2, controlled (H)      GERD (gastroesophageal reflux disease)      HIV (human immunodeficiency virus infection) (H)      Periungual wart      Sleep apnea     doesn't use CPAP       Past Surgical History:   Procedure Laterality Date     C NONSPECIFIC PROCEDURE      right forearm fracture     COLONOSCOPY Left 1/22/2016    Procedure: COMBINED COLONOSCOPY, SINGLE OR MULTIPLE BIOPSY/POLYPECTOMY BY BIOPSY;  Surgeon: Clark Saini MD;  Location: [facility] GI     HC EXPLORE UNDESC TESTIS,INGUIN/SCROTAL       LAPAROSCOPIC APPENDECTOMY N/A 1/31/2018    Procedure: LAPAROSCOPIC APPENDECTOMY;  LAPAROSCOPIC APPENDECTOMY;  Surgeon: Dawn Holt MD;  Location: [facility] OR     LAPAROSCOPY DIAGNOSTIC (GENERAL) N/A 7/26/2016    Procedure: LAPAROSCOPY DIAGNOSTIC (GENERAL);  Surgeon: Susannah Arriaga MD;  Location: [facility] OR     LAPAROSCOPY DIAGNOSTIC (GENERAL) N/A 4/16/2018     Procedure: LAPAROSCOPY DIAGNOSTIC (GENERAL);  Diagnostic laparoscopy and lysis of adhesions;  Surgeon: Prince Dowling MD;  Location: [facility] OR     OPTICAL TRACKING SYSTEM CRANIOTOMY, EXCISE TUMOR, COMBINED Left 4/10/2015    Procedure: COMBINED OPTICAL TRACKING SYSTEM CRANIOTOMY, EXCISE TUMOR;  Surgeon: Mirlande Colmenares MD;  Location: [facility] OR     REPAIR GAMEKEEPER'S THUMB Right 2016    Procedure: REPAIR LIGAMENT ULNAR COLLATERAL THUMB (GAMEKEEPER'S);  Surgeon: Evin Zamorano MD;  Location: [facility] OR       Family History   Problem Relation Age of Onset     Diabetes Brother      Diabetes Father      Alzheimer Disease Father      Unknown/Adopted Mother      Diabetes Paternal Grandfather      Cancer No family hx of         no skin cancer     Skin Cancer No family hx of         no famiy hx of skin cancer     Glaucoma No family hx of      Macular Degeneration No family hx of        Social History     Tobacco Use     Smoking status: Current Some Day Smoker     Packs/day: 0.25     Types: Cigarettes     Last attempt to quit: 10/28/2016     Years since quittin.6     Smokeless tobacco: Former User   Substance Use Topics     Alcohol use: No     Alcohol/week: 0.0 oz     Comment: Last etoh in        No current facility-administered medications for this encounter.      Current Outpatient Medications   Medication     Bictegravir-Emtricitab-Tenofov -25 MG TABS     cephALEXin (KEFLEX) 500 MG capsule     empagliflozin (JARDIANCE) 10 MG TABS tablet     ranitidine (ZANTAC) 150 MG tablet     blood glucose (NO BRAND SPECIFIED) lancets standard     blood glucose monitoring (NO BRAND SPECIFIED) meter device kit     blood glucose monitoring (NO BRAND SPECIFIED) test strip     insulin glargine (BASAGLAR KWIKPEN) 100 UNIT/ML pen     insulin pen needle (B-D U/F) 31G X 8 MM     omeprazole (PRILOSEC) 20 MG CR capsule        Allergies   Allergen Reactions     Metformin      Abdominal pain     Milk [Lac Bovis] Hives      "Tylenol [Acetaminophen] Itching     Dulaglutide Rash     I have reviewed the Medications, Allergies, Past Medical and Surgical History, and Social History in the Epic system.    Review of Systems   Constitutional: Negative for fever.   HENT: Negative for congestion.    Eyes: Negative for redness.   Respiratory: Negative for shortness of breath.    Cardiovascular: Negative for chest pain.   Gastrointestinal: Negative for abdominal pain.   Genitourinary: Negative for difficulty urinating.   Musculoskeletal: Negative for arthralgias and neck stiffness.        Positive for left 2nd toe pain   Skin: Negative for color change.   Neurological: Negative for headaches.   Psychiatric/Behavioral: Negative for confusion.       Physical Exam   BP: 126/86  Heart Rate: 113  Temp: 98.8  F (37.1  C)  Resp: 16  Height: 162.6 cm (5' 4\")  Weight: 77.1 kg (170 lb)  SpO2: 100 %      Physical Exam   Constitutional: He is oriented to person, place, and time. He appears well-developed. No distress.   HENT:   Head: Normocephalic and atraumatic.   Mouth/Throat: Oropharynx is clear and moist.   Eyes: Conjunctivae are normal.   Neck: Normal range of motion. Neck supple.   Cardiovascular: Normal rate, regular rhythm and normal heart sounds.   Pulmonary/Chest: Effort normal and breath sounds normal. No respiratory distress.   Abdominal: Soft. There is no tenderness.   Musculoskeletal: Normal range of motion.   Full range of motion.  No edema.  Second digit of left toe with mild erythema, warmth on the distal tip, small area of fluctuance/inflammation at tissue fold at base of nailbed suggestive of paronychia.    Neurological: He is alert and oriented to person, place, and time. No cranial nerve deficit.   Skin: Skin is warm and dry. No rash noted.   Psychiatric: He has a normal mood and affect. His behavior is normal.       ED Course        Procedures  .    Procedure: Incision and Drainage   LOCATIONS:  Left toe #2     ANESTHESIA:  None - per " patients request     PREPARATION:  Cleansed with Normal Saline, Hibiclens & chloraprep     PROCEDURE:  Area was incised with #10 with a Single Straight incision.  Wound treatment included Purulent Drainage.  Packing consisted of No Packing.  Appropriate dressing was applied to cover the area.    Patient Status: Patient tolerated the procedure well. There were no complications.    Labs Ordered and Resulted from Time of ED Arrival Up to the Time of Departure from the ED - No data to display      Assessments & Plan (with Medical Decision Making)   Andrew Lockwood is a homeless 51 year old male with a history of HIV and DM II who presents for evaluation of left toe pain.  Patient seen 3 days ago for similar symptoms and was prescribed Keflex however just picked it up and started taking it yesterday.  Patient now with continued irritation and what appears to be a paronychia I that developed at the base of his left second nail.  At this time the wound was irrigated/hot water soaked, Incision and drainage performed, wound re-soaked with hot water, and dressing applied.  Will have patient continue hot water soaks when able to, continue antibiotics, and return precautions discussed.  Patient understands and agrees with plan. .The patient is discharged home with instructions to return if their symptoms persist or worsen.  Plan for close follow-up with their primary physician.  I discussed workup, results, treatment, and plan with the patient.  Patient understands and agrees with the plan.        I have reviewed the nursing notes.    I have reviewed the findings, diagnosis, plan and need for follow up with the patient.       Medication List      There are no discharge medications for this visit.         Final diagnoses:   Pain of toe of left foot   Paronychia of toe, left   I, Ignacio Medeiros, am serving as a trained medical scribe to document services personally performed by Sheila Bruno MD, based on the provider's statements to  me.   I, Sheila Bruno MD, was physically present and have reviewed and verified the accuracy of this note documented by Ignacio Medeiros.    6/5/2019   [facility], [place], EMERGENCY DEPARTMENT     Sheila Bruno MD  06/05/19 0259

## 2019-06-05 NOTE — TELEPHONE ENCOUNTER
Can we order him a meter, test strips and lancets so he can pick them up at his appt on Friday (or sooner if he's able).  Glucose was high today.

## 2019-06-05 NOTE — TELEPHONE ENCOUNTER
Rx's for meter, test strips, and lancets were sent to [facility].  Voice message was left for patient to pick these up and start using as soon as he can, or he can  when he is at [facility] for appt with Dr. Lamb on 6/7/19.    Deb Quiroz RN on 6/5/2019 at 12:14 PM

## 2019-06-05 NOTE — PROGRESS NOTES
Per [facility] Protocol, Pt is due for routine labs based on disease state or monitoring of medications. Lab orders entered per clinic protocol.  Toyin Montiel RN

## 2019-06-05 NOTE — ED AVS SNAPSHOT
[facility], [place], Emergency Department  [address] [place] [id number]  Phone:  [phone]                                    Andrew Lockwood   MRN: [id number]    Department:  [facility], Emergency Department   Date of Visit:  6/5/2019           After Visit Summary Signature Page    I have received my discharge instructions, and my questions have been answered. I have discussed any challenges I see with this plan with the nurse or doctor.    ..........................................................................................................................................  Patient/Patient Representative Signature      ..........................................................................................................................................  Patient Representative Print Name and Relationship to Patient    ..................................................               ................................................  Date                                   Time    ..........................................................................................................................................  Reviewed by Signature/Title    ...................................................              ..............................................  Date                                               Time          22EPIC Rev 08/18

## 2019-06-05 NOTE — TELEPHONE ENCOUNTER
Patient came to clinic to ask about scheduling an appt with Dr. Lamb.  He also relayed he is concerned about his blood sugar, as he does not have a meter.  An appt was made for him to see Dr. Lamb on Fri 6/7, and a glucose lab and A1C were ordered for him to have done at [facility] lab today.    Deb Quiroz RN on 6/5/2019 at 9:29 AM

## 2019-06-05 NOTE — DISCHARGE INSTRUCTIONS
Thank you for your patience today.  Please follow-up with your regular doctor in the next 2-3 days for further evaluation and follow-up care.  Please call to schedule an appointment.  Please continue your own medications.  Please make sure you take antibiotics as prescribed.  Please try to soak your foot in hot soapy water 2-3 times daily or as much as you can.  Please return to the ER if you develop high fever, severe pain, increased swelling, redness, or any worsening of your current symptoms.  It was a pleasure taking care of you today.  We hope you feel better soon.

## 2019-06-07 ENCOUNTER — TELEPHONE (OUTPATIENT)
Dept: PHARMACY | Facility: CLINIC | Age: 54
End: 2019-06-07

## 2019-06-07 ENCOUNTER — TELEPHONE (OUTPATIENT)
Dept: ENDOCRINOLOGY | Facility: CLINIC | Age: 54
End: 2019-06-07

## 2019-06-07 ENCOUNTER — OFFICE VISIT (OUTPATIENT)
Dept: INTERNAL MEDICINE | Facility: CLINIC | Age: 54
End: 2019-06-07
Payer: COMMERCIAL

## 2019-06-07 ENCOUNTER — OFFICE VISIT (OUTPATIENT)
Dept: ENDOCRINOLOGY | Facility: CLINIC | Age: 54
End: 2019-06-07
Payer: COMMERCIAL

## 2019-06-07 VITALS
OXYGEN SATURATION: 98 % | RESPIRATION RATE: 20 BRPM | WEIGHT: 166 LBS | DIASTOLIC BLOOD PRESSURE: 85 MMHG | HEART RATE: 109 BPM | BODY MASS INDEX: 28.49 KG/M2 | SYSTOLIC BLOOD PRESSURE: 122 MMHG

## 2019-06-07 VITALS
WEIGHT: 166 LBS | DIASTOLIC BLOOD PRESSURE: 85 MMHG | HEART RATE: 109 BPM | HEIGHT: 64 IN | BODY MASS INDEX: 28.34 KG/M2 | SYSTOLIC BLOOD PRESSURE: 122 MMHG

## 2019-06-07 DIAGNOSIS — K62.82 ANAL DYSPLASIA: Primary | ICD-10-CM

## 2019-06-07 DIAGNOSIS — E11.8 TYPE 2 DIABETES MELLITUS WITH COMPLICATION, WITH LONG-TERM CURRENT USE OF INSULIN (H): Primary | ICD-10-CM

## 2019-06-07 DIAGNOSIS — Z79.4 TYPE 2 DIABETES MELLITUS WITH COMPLICATION, WITH LONG-TERM CURRENT USE OF INSULIN (H): ICD-10-CM

## 2019-06-07 DIAGNOSIS — E11.8 TYPE 2 DIABETES MELLITUS WITH COMPLICATION, WITH LONG-TERM CURRENT USE OF INSULIN (H): ICD-10-CM

## 2019-06-07 DIAGNOSIS — B20 HUMAN IMMUNODEFICIENCY VIRUS (HIV) DISEASE (H): ICD-10-CM

## 2019-06-07 DIAGNOSIS — E11.9 TYPE 2 DIABETES MELLITUS (H): Primary | ICD-10-CM

## 2019-06-07 DIAGNOSIS — E11.9 TYPE 2 DIABETES MELLITUS (H): ICD-10-CM

## 2019-06-07 DIAGNOSIS — E11.65 TYPE 2 DIABETES MELLITUS WITH HYPERGLYCEMIA, WITHOUT LONG-TERM CURRENT USE OF INSULIN (H): ICD-10-CM

## 2019-06-07 DIAGNOSIS — Z79.4 TYPE 2 DIABETES MELLITUS WITH COMPLICATION, WITH LONG-TERM CURRENT USE OF INSULIN (H): Primary | ICD-10-CM

## 2019-06-07 LAB
ALBUMIN SERPL-MCNC: 3.8 G/DL (ref 3.4–5)
ALP SERPL-CCNC: 187 U/L (ref 40–150)
ALT SERPL W P-5'-P-CCNC: 24 U/L (ref 0–70)
ANION GAP SERPL CALCULATED.3IONS-SCNC: 8 MMOL/L (ref 3–14)
AST SERPL W P-5'-P-CCNC: 15 U/L (ref 0–45)
BASOPHILS # BLD AUTO: 0.1 10E9/L (ref 0–0.2)
BASOPHILS NFR BLD AUTO: 1 %
BILIRUB SERPL-MCNC: 0.6 MG/DL (ref 0.2–1.3)
BUN SERPL-MCNC: 16 MG/DL (ref 7–30)
CALCIUM SERPL-MCNC: 10.3 MG/DL (ref 8.5–10.1)
CD3 CELLS # BLD: 1446 CELLS/UL (ref 603–2990)
CD3 CELLS NFR BLD: 51 % (ref 49–84)
CD3+CD4+ CELLS # BLD: 417 CELLS/UL (ref 441–2156)
CD3+CD4+ CELLS NFR BLD: 15 % (ref 28–63)
CD3+CD4+ CELLS/CD3+CD8+ CLL BLD: 0.45 % (ref 1.4–2.6)
CD3+CD8+ CELLS # BLD: 946 CELLS/UL (ref 125–1312)
CD3+CD8+ CELLS NFR BLD: 33 % (ref 10–40)
CHLORIDE SERPL-SCNC: 98 MMOL/L (ref 94–109)
CO2 SERPL-SCNC: 25 MMOL/L (ref 20–32)
CREAT SERPL-MCNC: 0.82 MG/DL (ref 0.66–1.25)
DIFFERENTIAL METHOD BLD: NORMAL
EOSINOPHIL # BLD AUTO: 0.2 10E9/L (ref 0–0.7)
EOSINOPHIL NFR BLD AUTO: 3.2 %
ERYTHROCYTE [DISTWIDTH] IN BLOOD BY AUTOMATED COUNT: 12.7 % (ref 10–15)
GFR SERPL CREATININE-BSD FRML MDRD: >90 ML/MIN/{1.73_M2}
GLUCOSE SERPL-MCNC: 470 MG/DL (ref 70–99)
HBA1C MFR BLD: 10.2 % (ref 0–5.6)
HCT VFR BLD AUTO: 45.1 % (ref 40–53)
HGB BLD-MCNC: 15.5 G/DL (ref 13.3–17.7)
IFC SPECIMEN: ABNORMAL
IMM GRANULOCYTES # BLD: 0.1 10E9/L (ref 0–0.4)
IMM GRANULOCYTES NFR BLD: 1.1 %
LYMPHOCYTES # BLD AUTO: 2.6 10E9/L (ref 0.8–5.3)
LYMPHOCYTES NFR BLD AUTO: 37 %
MCH RBC QN AUTO: 31 PG (ref 26.5–33)
MCHC RBC AUTO-ENTMCNC: 34.4 G/DL (ref 31.5–36.5)
MCV RBC AUTO: 90 FL (ref 78–100)
MONOCYTES # BLD AUTO: 0.5 10E9/L (ref 0–1.3)
MONOCYTES NFR BLD AUTO: 6.3 %
NEUTROPHILS # BLD AUTO: 3.6 10E9/L (ref 1.6–8.3)
NEUTROPHILS NFR BLD AUTO: 51.4 %
NRBC # BLD AUTO: 0 10*3/UL
NRBC BLD AUTO-RTO: 0 /100
PLATELET # BLD AUTO: 404 10E9/L (ref 150–450)
POTASSIUM SERPL-SCNC: 3.9 MMOL/L (ref 3.4–5.3)
PROT SERPL-MCNC: 8.1 G/DL (ref 6.8–8.8)
RBC # BLD AUTO: 5 10E12/L (ref 4.4–5.9)
SODIUM SERPL-SCNC: 131 MMOL/L (ref 133–144)
WBC # BLD AUTO: 7.1 10E9/L (ref 4–11)

## 2019-06-07 RX ORDER — INSULIN GLARGINE 100 [IU]/ML
INJECTION, SOLUTION SUBCUTANEOUS
Qty: 15 ML | Refills: 0 | Status: SHIPPED | OUTPATIENT
Start: 2019-06-07 | End: 2019-07-19

## 2019-06-07 RX ORDER — INSULIN LISPRO 100 [IU]/ML
INJECTION, SOLUTION INTRAVENOUS; SUBCUTANEOUS
Qty: 15 ML | Refills: 3 | Status: ON HOLD | OUTPATIENT
Start: 2019-06-07 | End: 2019-11-12

## 2019-06-07 RX ORDER — INSULIN ASPART 100 [IU]/ML
INJECTION, SOLUTION INTRAVENOUS; SUBCUTANEOUS
Qty: 15 ML | Refills: 3 | Status: ON HOLD | OUTPATIENT
Start: 2019-06-07 | End: 2019-11-12

## 2019-06-07 RX ORDER — INSULIN GLARGINE 100 [IU]/ML
INJECTION, SOLUTION SUBCUTANEOUS
Qty: 15 ML | Refills: 0
Start: 2019-06-07 | End: 2019-06-07

## 2019-06-07 ASSESSMENT — PAIN SCALES - GENERAL
PAINLEVEL: NO PAIN (0)
PAINLEVEL: NO PAIN (0)

## 2019-06-07 ASSESSMENT — PATIENT HEALTH QUESTIONNAIRE - PHQ9
SUM OF ALL RESPONSES TO PHQ QUESTIONS 1-9: 0
5. POOR APPETITE OR OVEREATING: NOT AT ALL
SUM OF ALL RESPONSES TO PHQ QUESTIONS 1-9: 0

## 2019-06-07 ASSESSMENT — ANXIETY QUESTIONNAIRES
2. NOT BEING ABLE TO STOP OR CONTROL WORRYING: NOT AT ALL
5. BEING SO RESTLESS THAT IT IS HARD TO SIT STILL: NOT AT ALL
1. FEELING NERVOUS, ANXIOUS, OR ON EDGE: NOT AT ALL
6. BECOMING EASILY ANNOYED OR IRRITABLE: NOT AT ALL
3. WORRYING TOO MUCH ABOUT DIFFERENT THINGS: NOT AT ALL
GAD7 TOTAL SCORE: 0
7. FEELING AFRAID AS IF SOMETHING AWFUL MIGHT HAPPEN: NOT AT ALL

## 2019-06-07 ASSESSMENT — MIFFLIN-ST. JEOR: SCORE: 1508.97
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
## 2019-06-07 NOTE — TELEPHONE ENCOUNTER
Nurse teaching given on novolog injection and the patient expresses understanding and acceptance of instructions with return demonstration. No questions or concerns at this time.  Zunilda Lindo 6/7/2019 10:31 AM

## 2019-06-07 NOTE — PATIENT INSTRUCTIONS
Take Basaglar 80 units each am.  Start Novolog ( meal time insulin ) 5 units with breakfast, 5 units with lunch and 5 units with dinner DAILY.  Check your blood sugar fasting each am, before lunch and before dinner DAILY.  See Joanne the diabetes educator today to learn how to use Novolog with meals.  See me in clinic 5-6 weeks.  See Podiatrist.  Ashley Connelly PA-C

## 2019-06-07 NOTE — LETTER
6/7/2019       RE: Andrew Lockwood  [address]  [place] [address]     Dear Colleague,    Thank you for referring your patient, Andrew Lockwood, to the [facility] ENDOCRINOLOGY at [facility]. Please see a copy of my visit note below.    HPI  Andrew Lockwood is a 51 year old male with type 2 diabetes mellitus here today for a follow up visit.  He was last seen in our clinic in Sept 2018 by Lenora Haley PA-C.  Pt states he was diagnosed with type 2 diabetes mellitus in 2014.  He tells me that he has had laser treatment in his left eye in the past.  He was seen here by ophthalmology and was diagnosed having an inferior and nasal peripheral horseshoe tear.    No known nephropathy or neuropathy.  His medical history is also significant for HIV/AIDS and CNS toxoplasmosis.  He is smoking 1 pk per day.  Andrew tells me he remains homeless.  Pt did not tolerate Trulicity- rash per pt, Metformin- GI distress, Januvia and Jardiance caused abdominal pain per patient.  For his diabetes, he is currently taking Basaglar 50 units subcutaneous each am and 30 units subcutaneous each pm.  Pt's A1C is high at 10.2 %.  I have no blood sugar data.  He denies missing his Basaglar doses.  On ROS today, he continues to smoke.  Mild cough.  He denies any shortness of breath at rest.  Pt reports blurred vision.  He is being treated with Keflex for left foot infection.  Patient denies frequent headaches, nausea, vomiting, chest pain, abdominal pain or diarrhea.  He denies dysuria, hematuria, numbness tingling or pain in his feet toes hands or fingers.    Diabetes Care  Retinopathy: no DR. He was seen by Oph here in 3/2018 with inferior and nasal horseshoe tears left eye.  Nephropathy:none; urine microalbuminuria was negative in May 2017. Urine microalbuminuria + in 11/2018.  Neuropathy: none.  Foot Exam: left foot infection- on Keflex.  Taking aspirin:no.  Lipids:  in 8/2018.      ROS  Please see  under history of present illness.    Allergies  Allergies   Allergen Reactions     Metformin      Abdominal pain     Milk [Lac Bovis] Hives     Tylenol [Acetaminophen] Itching     Dulaglutide Rash       Medications  Current Outpatient Medications   Medication Sig Dispense Refill     insulin glargine (BASAGLAR KWIKPEN) 100 UNIT/ML pen Inject 80 units subcutaneous each AM ONLY. 15 mL 0     insulin pen needle (BD PEN NEEDLE SCOTT 2ND GEN) 32G X 4 MM miscellaneous Use 3-4 daily. 250 each 3     NOVOLOG FLEXPEN 100 UNIT/ML soln Inject 5 units with breakfast, lunch and dinner daily. 15 mL 3     Bictegravir-Emtricitab-Tenofov -25 MG TABS Take 1 tablet by mouth daily 30 tablet 11     blood glucose (NO BRAND SPECIFIED) lancets standard Use to test blood sugar four times daily or as directed. 100 each 11     blood glucose (NO BRAND SPECIFIED) test strip 1 strip by In Vitro route 4 times daily (before meals and nightly) Use to test blood sugars 4 times daily or as directed 100 strip 11     blood glucose monitoring (NO BRAND SPECIFIED) meter device kit Use to test blood sugar 4 times daily or as directed. 1 kit 0     cephALEXin (KEFLEX) 500 MG capsule Take 1 capsule (500 mg) by mouth 4 times daily 28 capsule 0     HUMALOG KWIKPEN 100 UNIT/ML soln Inject 5 units with breakfast, lunch and dinner daily. 15 mL 3     insulin pen needle (B-D U/F) 31G X 8 MM miscellaneous Use 1 pen needles daily or as directed. 100 each 3     omeprazole (PRILOSEC) 20 MG CR capsule Take 1 capsule (20 mg) by mouth 2 times daily 180 capsule 3     ranitidine (ZANTAC) 150 MG tablet Take 1 tablet (150 mg) by mouth 2 times daily 60 tablet 11       Family History  family history includes Alzheimer Disease in his father; Diabetes in his brother, father, and paternal grandfather; Unknown/Adopted in his mother.    Social History   reports that he has been smoking cigarettes.  He has been smoking about 0.25 packs per day. He has quit using smokeless tobacco.  "He reports that he does not drink alcohol or use drugs.   Pt is homeless at this time.    Past Medical History  Past Medical History:   Diagnosis Date     AIDS (H)      Allergic rhinitis due to other allergen     DNS     Chronic abdominal pain      CNS toxoplasmosis (H)      Diabetes type 2, controlled (H)      GERD (gastroesophageal reflux disease)      HIV (human immunodeficiency virus infection) (H)      Periungual wart      Sleep apnea     doesn't use CPAP       Past Surgical History:   Procedure Laterality Date     C NONSPECIFIC PROCEDURE      right forearm fracture     COLONOSCOPY Left 1/22/2016    Procedure: COMBINED COLONOSCOPY, SINGLE OR MULTIPLE BIOPSY/POLYPECTOMY BY BIOPSY;  Surgeon: Clark Saini MD;  Location: [facility]     HC EXPLORE UNDESC TESTIS,INGUIN/SCROTAL       LAPAROSCOPIC APPENDECTOMY N/A 1/31/2018    Procedure: LAPAROSCOPIC APPENDECTOMY;  LAPAROSCOPIC APPENDECTOMY;  Surgeon: Dawn Holt MD;  Location: [facility]     LAPAROSCOPY DIAGNOSTIC (GENERAL) N/A 7/26/2016    Procedure: LAPAROSCOPY DIAGNOSTIC (GENERAL);  Surgeon: Susannah Arriaga MD;  Location: [facility]     LAPAROSCOPY DIAGNOSTIC (GENERAL) N/A 4/16/2018    Procedure: LAPAROSCOPY DIAGNOSTIC (GENERAL);  Diagnostic laparoscopy and lysis of adhesions;  Surgeon: Prince Dowling MD;  Location: [facility]     OPTICAL TRACKING SYSTEM CRANIOTOMY, EXCISE TUMOR, COMBINED Left 4/10/2015    Procedure: COMBINED OPTICAL TRACKING SYSTEM CRANIOTOMY, EXCISE TUMOR;  Surgeon: Mirlande Colmenares MD;  Location: [facility]     REPAIR GAMEKEEPER'S THUMB Right 12/2/2016    Procedure: REPAIR LIGAMENT ULNAR COLLATERAL THUMB (GAMEKEEPER'S);  Surgeon: Evin Zamorano MD;  Location: [facility]       Physical Exam  /85   Pulse 109   Ht 1.626 m (5' 4\")   Wt 75.3 kg (166 lb)   BMI 28.49 kg/m     Body mass index is 28.49 kg/m .    GENERAL : In no apparent distress  FEET:  No ulcers; normal monofilamentous exam.    RESULTS  Creatinine   Date " Value Ref Range Status   06/07/2019 0.82 0.66 - 1.25 mg/dL Final     GFR Estimate   Date Value Ref Range Status   06/07/2019 >90 >60 mL/min/[1.73_m2] Final     Comment:     Non  GFR Calc  Starting 12/18/2018, serum creatinine based estimated GFR (eGFR) will be   calculated using the Chronic Kidney Disease Epidemiology Collaboration   (CKD-EPI) equation.       Hemoglobin A1C   Date Value Ref Range Status   06/07/2019 10.2 (H) 0 - 5.6 % Final     Comment:     Normal <5.7% Prediabetes 5.7-6.4%  Diabetes 6.5% or higher - adopted from ADA   consensus guidelines.       Potassium   Date Value Ref Range Status   06/07/2019 3.9 3.4 - 5.3 mmol/L Final     ALT   Date Value Ref Range Status   06/07/2019 24 0 - 70 U/L Final     AST   Date Value Ref Range Status   06/07/2019 15 0 - 45 U/L Final     TSH   Date Value Ref Range Status   02/15/2018 3.57 0.40 - 4.00 mU/L Final     T4 Free   Date Value Ref Range Status   04/14/2015 1.02 0.76 - 1.46 ng/dL Final       Cholesterol   Date Value Ref Range Status   08/15/2018 180 <200 mg/dL Final   08/11/2017 194 <200 mg/dL Final     HDL Cholesterol   Date Value Ref Range Status   08/15/2018 38 (L) >39 mg/dL Final   08/11/2017 37 (L) >39 mg/dL Final     LDL Cholesterol Calculated   Date Value Ref Range Status   08/15/2018 105 (H) <100 mg/dL Final     Comment:     Above desirable:  100-129 mg/dl  Borderline High:  130-159 mg/dL  High:             160-189 mg/dL  Very high:       >189 mg/dl     08/11/2017 88 <100 mg/dL Final     Comment:     Desirable:       <100 mg/dl     Triglycerides   Date Value Ref Range Status   08/15/2018 190 (H) <150 mg/dL Final     Comment:     Borderline high:  150-199 mg/dl  High:             200-499 mg/dl  Very high:       >499 mg/dl     08/11/2017 345 (H) <150 mg/dL Final     Comment:     Borderline high:  150-199 mg/dl   High:             200-499 mg/dl   Very high:       >499 mg/dl       Cholesterol/HDL Ratio   Date Value Ref Range Status    05/07/2015 5.0 0.0 - 5.0 Final   04/18/2005 6.3 (H) 0.0 - 5.0 Final     Lab Results   Component Value Date    A1C 6.0 08/11/2017    A1C 6.2 05/23/2017    A1C 6.4 01/23/2017    A1C 7.0 08/18/2016    A1C 8.3 06/16/2016     A1C  10.2 %.    ASSESSMENT/PLAN:    1. TYPE 2 DIABETES MELLITUS:  Uncontrolled type 2 diabetes mellitus.  Pt instructed to take Basaglar 80 units subcutaneous each am and start Novolog 5 units with meals.  He is to meet with our CDE today to learn how to use meal time insulin.  His unused insulin will be stored in the refrigerator at the pharmacy per patient.  He is to check his fasting blood sugar each am and blood sugar before lunch and dinner DAILY.  He was instructed to see me in clinic with his glucose meter in 5 weeks.  He is normotensive today.  Referral to Podiatry placed today.    2. HIV/AIDS: Pt is followed here by ID staff.    3.  Return to Endocrine Clinic to see me in 5 weeks.      Again, thank you for allowing me to participate in the care of your patient.      Sincerely,    Ashley Connelly PA-C
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
## 2019-06-07 NOTE — NURSING NOTE
"Chief Complaint   Patient presents with     Diabetes     follow up diabetes  \" my sugars have been high\"   Janny Brian LPN 7:12 AM on 6/7/2019  "

## 2019-06-08 ENCOUNTER — OFFICE VISIT (OUTPATIENT)
Dept: ORTHOPEDICS | Facility: CLINIC | Age: 54
End: 2019-06-08
Payer: COMMERCIAL

## 2019-06-08 VITALS
DIASTOLIC BLOOD PRESSURE: 85 MMHG | HEART RATE: 109 BPM | SYSTOLIC BLOOD PRESSURE: 122 MMHG | HEIGHT: 64 IN | BODY MASS INDEX: 28.34 KG/M2 | WEIGHT: 166 LBS

## 2019-06-08 DIAGNOSIS — M67.911 TENDINOPATHY OF RIGHT ROTATOR CUFF: Primary | ICD-10-CM

## 2019-06-08 DIAGNOSIS — M67.921 TENDINOPATHY OF RIGHT BICEPS TENDON: ICD-10-CM

## 2019-06-08 RX ORDER — LIDOCAINE HYDROCHLORIDE 10 MG/ML
4 INJECTION, SOLUTION EPIDURAL; INFILTRATION; INTRACAUDAL; PERINEURAL
Status: DISCONTINUED | OUTPATIENT
Start: 2019-06-08 | End: 2019-11-12

## 2019-06-08 RX ORDER — TRIAMCINOLONE ACETONIDE 40 MG/ML
40 INJECTION, SUSPENSION INTRA-ARTICULAR; INTRAMUSCULAR
Status: DISCONTINUED | OUTPATIENT
Start: 2019-06-08 | End: 2019-11-12

## 2019-06-08 RX ADMIN — LIDOCAINE HYDROCHLORIDE 4 ML: 10 INJECTION, SOLUTION EPIDURAL; INFILTRATION; INTRACAUDAL; PERINEURAL at 10:59

## 2019-06-08 RX ADMIN — TRIAMCINOLONE ACETONIDE 40 MG: 40 INJECTION, SUSPENSION INTRA-ARTICULAR; INTRAMUSCULAR at 10:59

## 2019-06-08 ASSESSMENT — MIFFLIN-ST. JEOR: SCORE: 1508.97

## 2019-06-08 ASSESSMENT — ANXIETY QUESTIONNAIRES: GAD7 TOTAL SCORE: 0

## 2019-06-08 NOTE — NURSING NOTE
[address] [address]  Dept: [phone]  ______________________________________________________________________________    Patient: Andrew Lockwood   : 1967   MRN: [id number]   2019    INVASIVE PROCEDURE SAFETY CHECKLIST    Date: 19   Procedure:Right subacromial CSI with Kenalog  Patient Name: Andrew Lockwood  MRN: [id number]  YOB: 1967    Action: Complete sections as appropriate. Any discrepancy results in a HARD COPY until resolved.     PRE PROCEDURE:  Patient ID verified with 2 identifiers (name and  or MRN): Yes  Procedure and site verified with patient/designee (when able): Yes  Accurate consent documentation in medical record: Yes  H&P (or appropriate assessment) documented in medical record: Yes  H&P must be up to 20 days prior to procedure and updates within 24 hours of procedure as applicable: NA  Relevant diagnostic and radiology test results appropriately labeled and displayed as applicable: Yes  Procedure site(s) marked with provider initials: NA    TIMEOUT:  Time-Out performed immediately prior to starting procedure, including verbal and active participation of all team members addressing the following:Yes  * Correct patient identify  * Confirmed that the correct side and site are marked  * An accurate procedure consent form  * Agreement on the procedure to be done  * Correct patient position  * Relevant images and results are properly labeled and appropriately displayed  * The need to administer antibiotics or fluids for irrigation purposes during the procedure as applicable   * Safety precautions based on patient history or medication use    DURING PROCEDURE: Verification of correct person, site, and procedures any time the responsibility for care of the patient is transferred to another member of the care team.       Prior to injection, verified patient identity using patient's name and date of  birth.  Due to injection administration, patient instructed to remain in clinic for 15 minutes  afterwards, and to report any adverse reaction to me immediately.    Joint injection was performed.      Drug Amount Wasted:  Yes: 1 mg/ml   Vial/Syringe: Single dose vial  Expiration Date:  10/23      Zain AUBREY Hamilton  June 8, 2019

## 2019-06-08 NOTE — PROGRESS NOTES
SPORTS & ORTHOPEDIC WALK-IN FOLLOW-UP VISIT 6/8/2019    Has pain along right side of his neck now. Has difficulty with shoulder abduction. IR causes pain on anterior aspect of shoulder.     Interval History:     Follow up reason: Injection helped for about a month, now the pain has returned but worse than before    Date of injury: NA    Date last seen: 1/10/19    Following Therapeutic Plan: Yes     Pain: Worsening    Function: Worsening      Medical History:    Any recent changes to your medical history? No    Any new medication prescribed since last visit? No    Review of Systems:    Do you have fever, chills, weight loss? No    Do you have any vision problems? No    Do you have any chest pain or edema? No    Do you have any shortness of breath or wheezing?  No    Do you have stomach problems? No    Do you have any numbness or focal weakness? No    Do you have diabetes? Yes, Type 2    Do you have problems with bleeding or clotting? No    Do you have an rashes or other skin lesions? No       Large Joint Injection/Arthocentesis: R subacromial bursa  Date/Time: 6/8/2019 10:59 AM  Performed by: Aguila Cannon DO  Authorized by: Aguila Cannon DO     Indications:  Pain  Needle Size:  22 G  Guidance: ultrasound    Approach:  Posterolateral  Location:  Shoulder      Site:  R subacromial bursa  Medications:  40 mg triamcinolone 40 MG/ML; 4 mL lidocaine (PF) 1 %  Outcome:  Tolerated well, no immediate complications  Procedure discussed: discussed risks, benefits, and alternatives    Consent Given by:  Patient  Timeout: timeout called immediately prior to procedure    Prep: patient was prepped and draped in usual sterile fashion     Scribed by Zain Hamilton ATC for Dr. Cannon on 6/8/19 at 10:30AM, based on the provider's statements to me.

## 2019-06-08 NOTE — LETTER
6/8/2019       RE: Andrew Lockwood  [address]  [place] [facility] [address]     Dear Colleague,    Thank you for referring your patient, Andrew Lockwood, to the [facility] SPORTS AND ORTHOPAEDIC WALK IN CLINIC at [facility]. Please see a copy of my visit note below.          SPORTS & ORTHOPEDIC WALK-IN FOLLOW-UP VISIT 6/8/2019    Has pain along right side of his neck now. Has difficulty with shoulder abduction. IR causes pain on anterior aspect of shoulder.     Interval History:     Follow up reason: Injection helped for about a month, now the pain has returned but worse than before    Date of injury: NA    Date last seen: 1/10/19    Following Therapeutic Plan: Yes     Pain: Worsening    Function: Worsening      Medical History:    Any recent changes to your medical history? No    Any new medication prescribed since last visit? No    Review of Systems:    Do you have fever, chills, weight loss? No    Do you have any vision problems? No    Do you have any chest pain or edema? No    Do you have any shortness of breath or wheezing?  No    Do you have stomach problems? No    Do you have any numbness or focal weakness? No    Do you have diabetes? Yes, Type 2    Do you have problems with bleeding or clotting? No    Do you have an rashes or other skin lesions? No       Large Joint Injection/Arthocentesis: R subacromial bursa  Date/Time: 6/8/2019 10:59 AM  Performed by: Aguila Cannon DO  Authorized by: Aguila Cannon DO     Indications:  Pain  Needle Size:  22 G  Guidance: ultrasound    Approach:  Posterolateral  Location:  Shoulder    Site:  R subacromial bursa  Medications:  40 mg triamcinolone 40 MG/ML; 4 mL lidocaine (PF) 1 %  Outcome:  Tolerated well, no immediate complications  Procedure discussed: discussed risks, benefits, and alternatives    Consent Given by:  Patient  Timeout: timeout called immediately prior to procedure    Prep: patient was prepped and draped in usual sterile  "fashion     Scribed by Zain Hamilton ATC for Dr. Cannon on 6/8/19 at 10:30AM, based on the provider's statements to me.    ESTABLISHED PATIENT FOLLOW-UP:  RECHECK (Right shoulder)     HISTORY OF PRESENT ILLNESS  Mr. Lockwood is a pleasant 51 year old year old male PMHx of HIV+ who presents to clinic today for follow-up of right shoulder pain over the past 18 months.  Seen on 1/10/19 by Dr. Gerardo who found biceps tendinopathy on LTD US.  Corticosteroid injected into bicep tendon sheath.     Interval History: CSI helped for approximately 1.5 months.  No PT was ultimately pursued after this time.  He notes pain anterior, laterally about shoulder. Worse with overhead motions. Cannot sleep on right shoulder.  Interested in repeat CSI if possible.    Currently on HIV Tx. Undetectable viral load.  Undomiciled currently and does not have his own cell phone or land line.     Additional medical/Social/Surgical histories reviewed in [facility] and updated as appropriate.    REVIEW OF SYSTEMS (6/8/2019)  CONSTITUTIONAL: Denies fever and weight loss  GASTROINTESTINAL: Denies abdominal pain, nausea, vomiting  MUSCULOSKELETAL: See HPI  SKIN: Denies any recent rash or lesion  NEUROLOGICAL: Denies numbness or focal weakness     PHYSICAL EXAM  /85   Pulse 109   Ht 1.626 m (5' 4\")   Wt 75.3 kg (166 lb)   BMI 28.49 kg/m       General  - normal appearance, in no obvious distress  CV  - normal radial pulse  Pulm  - normal respiratory pattern, non-labored  Musculoskeletal - shoulder  - inspection: normal bone and joint alignment, no obvious deformity, no scapular winging, no AC step-off  - palpation: mildly tender RC insertion, normal clavicle, non-tender AC  - ROM:  painful and limited flexion and ER at end range, limited IR  - strength: 5/5  strength, 5/5 in all shoulder planes  - special tests:  (-) Speed's  (+) Neer  (+) Hawkin's  (+) Becky's  (+) Speed's test  (-) apprehension  (-) subscap lift-off  Neuro  - no sensory or motor " deficit, grossly normal coordination, normal muscle tone  Skin  - no ecchymosis, erythema, warmth, or induration, no obvious rash  Psych  - interactive, appropriate, normal mood and affect    IMAGING :   COMPARISON: 4/6/2015.     CLINICAL HISTORY: Right shoulder pain.     FINDINGS: Neutral, Grashey, axillary, and Y views of the right  shoulder were obtained. No evidence of fracture or dislocation  involving the right shoulder. Acromioclavicular joint is well aligned.     IMPRESSION: No evidence of fracture or dislocation involving the right  Shoulder.    ASSESSMENT & PLAN  Rotator cuff tendinopathy of right shoulder  Biceps tendonitis of right shoulder    Mr. Lockwood is a 51 year old year old male PMHx of HIV, T2 diabetes and proximal biceps tendinopathy who presents to clinic today with persisting right shoulder pain over the last 18 months.  Impingement findings on exam in addition to known biceps tendinopathy.  Will attempt diagnostic/therapeutic relief at subacromial bursa at this time.  He also understands PT will be helpful to ameliorate pain long-term.      -US-guided subacromial injection  -PT referral for cuff program/biceps  -Ice, analgesics PRN  -Follow up 6 weeks, if PT compliant and pain persists, consider MRI.    Subacromial Bursa - Ultrasound Guided  The patient was informed of the risks and the benefits of the procedure and a written consent was signed.  The patient s right shoulder was prepped with chlorhexidine in sterile fashion.   40 mg of triamcinolone suspension was drawn up into a 5 mL syringe with 4 mL of 1% lidocaine.  Injection was performed using sterile technique.  Under ultrasound guidance a 1.5-inch 25-gauge needle was used to enter the subacromial bursa.  Anterolateral approach was used with arm held in Crass position.  Needle placement was visualized and documented with ultrasound.  Ultrasound visualization was necessary to ensure placement in to the bursa and not the rotator cuff  tendon which could potentially cause further tendon damage.  Injection performed long axis to the probe.  Injection solution visualized within the joint space.  Images were permanently stored for the patient's record.  There were no complications. The patient tolerated the procedure well. There was negligible bleeding.   The patient was instructed to ice the shoulder upon leaving clinic and refrain from overuse over the next 3 days.   The patient was instructed to call or go to the emergency room with any unusual pain, swelling, redness, or if otherwise concerned.  It was a pleasure seeing Marina Cannon DO, CAM  Primary Care Sports Medicine

## 2019-06-08 NOTE — PROGRESS NOTES
"ESTABLISHED PATIENT FOLLOW-UP:  RECHECK (Right shoulder)     HISTORY OF PRESENT ILLNESS  Mr. Lockwood is a pleasant 51 year old year old male PMHx of HIV+ who presents to clinic today for follow-up of right shoulder pain over the past 18 months.  Seen on 1/10/19 by Dr. Gerardo who found biceps tendinopathy on LTD US.  Corticosteroid injected into bicep tendon sheath.     Interval History: CSI helped for approximately 1.5 months.  No PT was ultimately pursued after this time.  He notes pain anterior, laterally about shoulder. Worse with overhead motions. Cannot sleep on right shoulder.  Interested in repeat CSI if possible.    Currently on HIV Tx. Undetectable viral load.  Undomiciled currently and does not have his own cell phone or land line.     Additional medical/Social/Surgical histories reviewed in [facility] and updated as appropriate.    REVIEW OF SYSTEMS (6/8/2019)  CONSTITUTIONAL: Denies fever and weight loss  GASTROINTESTINAL: Denies abdominal pain, nausea, vomiting  MUSCULOSKELETAL: See HPI  SKIN: Denies any recent rash or lesion  NEUROLOGICAL: Denies numbness or focal weakness     PHYSICAL EXAM  /85   Pulse 109   Ht 1.626 m (5' 4\")   Wt 75.3 kg (166 lb)   BMI 28.49 kg/m      General  - normal appearance, in no obvious distress  CV  - normal radial pulse  Pulm  - normal respiratory pattern, non-labored  Musculoskeletal - shoulder  - inspection: normal bone and joint alignment, no obvious deformity, no scapular winging, no AC step-off  - palpation: mildly tender RC insertion, normal clavicle, non-tender AC  - ROM:  painful and limited flexion and ER at end range, limited IR  - strength: 5/5  strength, 5/5 in all shoulder planes  - special tests:  (-) Speed's  (+) Neer  (+) Hawkin's  (+) Becky's  (+) Speed's test  (-) apprehension  (-) subscap lift-off  Neuro  - no sensory or motor deficit, grossly normal coordination, normal muscle tone  Skin  - no ecchymosis, erythema, warmth, or induration, no obvious " rash  Psych  - interactive, appropriate, normal mood and affect    IMAGING :   COMPARISON: 4/6/2015.     CLINICAL HISTORY: Right shoulder pain.     FINDINGS: Neutral, Grashey, axillary, and Y views of the right  shoulder were obtained. No evidence of fracture or dislocation  involving the right shoulder. Acromioclavicular joint is well aligned.     IMPRESSION: No evidence of fracture or dislocation involving the right  Shoulder.       ASSESSMENT & PLAN  Rotator cuff tendinopathy of right shoulder  Biceps tendonitis of right shoulder    Mr. Lockwood is a 51 year old year old male PMHx of HIV, T2 diabetes and proximal biceps tendinopathy who presents to clinic today with persisting right shoulder pain over the last 18 months.  Impingement findings on exam in addition to known biceps tendinopathy.  Will attempt diagnostic/therapeutic relief at subacromial bursa at this time.  He also understands PT will be helpful to ameliorate pain long-term.      -US-guided subacromial injection  -PT referral for cuff program/biceps  -Ice, analgesics PRN  -Follow up 6 weeks, if PT compliant and pain persists, consider MRI.    Subacromial Bursa - Ultrasound Guided  The patient was informed of the risks and the benefits of the procedure and a written consent was signed.  The patient s right shoulder was prepped with chlorhexidine in sterile fashion.   40 mg of triamcinolone suspension was drawn up into a 5 mL syringe with 4 mL of 1% lidocaine.  Injection was performed using sterile technique.  Under ultrasound guidance a 1.5-inch 25-gauge needle was used to enter the subacromial bursa.  Anterolateral approach was used with arm held in Crass position.  Needle placement was visualized and documented with ultrasound.  Ultrasound visualization was necessary to ensure placement in to the bursa and not the rotator cuff tendon which could potentially cause further tendon damage.  Injection performed long axis to the probe.  Injection solution  visualized within the joint space.  Images were permanently stored for the patient's record.  There were no complications. The patient tolerated the procedure well. There was negligible bleeding.   The patient was instructed to ice the shoulder upon leaving clinic and refrain from overuse over the next 3 days.   The patient was instructed to call or go to the emergency room with any unusual pain, swelling, redness, or if otherwise concerned.  It was a pleasure seeing Marina Cannon DO, [facility]  Primary Care Sports Medicine

## 2019-06-10 ENCOUNTER — TELEPHONE (OUTPATIENT)
Dept: SURGERY | Facility: CLINIC | Age: 54
End: 2019-06-10

## 2019-06-10 DIAGNOSIS — E11.9 TYPE 2 DIABETES MELLITUS WITHOUT COMPLICATION, WITHOUT LONG-TERM CURRENT USE OF INSULIN (H): ICD-10-CM

## 2019-06-11 LAB
HIV1 RNA # PLAS NAA DL=20: 147 {COPIES}/ML
HIV1 RNA SERPL NAA+PROBE-LOG#: 2.2 {LOG_COPIES}/ML

## 2019-06-11 NOTE — PROGRESS NOTES
HPI  Andrew Lockwood is a 51 year old male with type 2 diabetes mellitus here today for a follow up visit.  He was last seen in our clinic in Sept 2018 by Lenora Haley PA-C.  Pt states he was diagnosed with type 2 diabetes mellitus in 2014.  He tells me that he has had laser treatment in his left eye in the past.  He was seen here by ophthalmology and was diagnosed having an inferior and nasal peripheral horseshoe tear.    No known nephropathy or neuropathy.  His medical history is also significant for HIV/AIDS and CNS toxoplasmosis.  He is smoking 1 pk per day.  Andrew tells me he remains homeless.  Pt did not tolerate Trulicity- rash per pt, Metformin- GI distress, Januvia and Jardiance caused abdominal pain per patient.  For his diabetes, he is currently taking Basaglar 50 units subcutaneous each am and 30 units subcutaneous each pm.  Pt's A1C is high at 10.2 %.  I have no blood sugar data.  He denies missing his Basaglar doses.  On ROS today, he continues to smoke.  Mild cough.  He denies any shortness of breath at rest.  Pt reports blurred vision.  He is being treated with Keflex for left foot infection.  Patient denies frequent headaches, nausea, vomiting, chest pain, abdominal pain or diarrhea.  He denies dysuria, hematuria, numbness tingling or pain in his feet toes hands or fingers.    Diabetes Care  Retinopathy: no DR. He was seen by Oph here in 3/2018 with inferior and nasal horseshoe tears left eye.  Nephropathy:none; urine microalbuminuria was negative in May 2017. Urine microalbuminuria + in 11/2018.  Neuropathy: none.  Foot Exam: left foot infection- on Keflex.  Taking aspirin:no.  Lipids:  in 8/2018.      ROS  Please see under history of present illness.    Allergies  Allergies   Allergen Reactions     Metformin      Abdominal pain     Milk [Lac Bovis] Hives     Tylenol [Acetaminophen] Itching     Dulaglutide Rash       Medications  Current Outpatient Medications   Medication Sig Dispense Refill      insulin glargine (BASAGLAR KWIKPEN) 100 UNIT/ML pen Inject 80 units subcutaneous each AM ONLY. 15 mL 0     insulin pen needle (BD PEN NEEDLE SCOTT 2ND GEN) 32G X 4 MM miscellaneous Use 3-4 daily. 250 each 3     NOVOLOG FLEXPEN 100 UNIT/ML soln Inject 5 units with breakfast, lunch and dinner daily. 15 mL 3     Bictegravir-Emtricitab-Tenofov -25 MG TABS Take 1 tablet by mouth daily 30 tablet 11     blood glucose (NO BRAND SPECIFIED) lancets standard Use to test blood sugar four times daily or as directed. 100 each 11     blood glucose (NO BRAND SPECIFIED) test strip 1 strip by In Vitro route 4 times daily (before meals and nightly) Use to test blood sugars 4 times daily or as directed 100 strip 11     blood glucose monitoring (NO BRAND SPECIFIED) meter device kit Use to test blood sugar 4 times daily or as directed. 1 kit 0     cephALEXin (KEFLEX) 500 MG capsule Take 1 capsule (500 mg) by mouth 4 times daily 28 capsule 0     HUMALOG KWIKPEN 100 UNIT/ML soln Inject 5 units with breakfast, lunch and dinner daily. 15 mL 3     insulin pen needle (B-D U/F) 31G X 8 MM miscellaneous Use 1 pen needles daily or as directed. 100 each 3     omeprazole (PRILOSEC) 20 MG CR capsule Take 1 capsule (20 mg) by mouth 2 times daily 180 capsule 3     ranitidine (ZANTAC) 150 MG tablet Take 1 tablet (150 mg) by mouth 2 times daily 60 tablet 11       Family History  family history includes Alzheimer Disease in his father; Diabetes in his brother, father, and paternal grandfather; Unknown/Adopted in his mother.    Social History   reports that he has been smoking cigarettes.  He has been smoking about 0.25 packs per day. He has quit using smokeless tobacco. He reports that he does not drink alcohol or use drugs.   Pt is homeless at this time.    Past Medical History  Past Medical History:   Diagnosis Date     AIDS (H)      Allergic rhinitis due to other allergen     DNS     Chronic abdominal pain      CNS toxoplasmosis (H)       "Diabetes type 2, controlled (H)      GERD (gastroesophageal reflux disease)      HIV (human immunodeficiency virus infection) (H)      Periungual wart      Sleep apnea     doesn't use CPAP       Past Surgical History:   Procedure Laterality Date     C NONSPECIFIC PROCEDURE      right forearm fracture     COLONOSCOPY Left 1/22/2016    Procedure: COMBINED COLONOSCOPY, SINGLE OR MULTIPLE BIOPSY/POLYPECTOMY BY BIOPSY;  Surgeon: Clark Saini MD;  Location: [facility]     HC EXPLORE UNDESC TESTIS,INGUIN/SCROTAL       LAPAROSCOPIC APPENDECTOMY N/A 1/31/2018    Procedure: LAPAROSCOPIC APPENDECTOMY;  LAPAROSCOPIC APPENDECTOMY;  Surgeon: Dawn Holt MD;  Location: [facility] OR     LAPAROSCOPY DIAGNOSTIC (GENERAL) N/A 7/26/2016    Procedure: LAPAROSCOPY DIAGNOSTIC (GENERAL);  Surgeon: Susannah Arriaga MD;  Location: [facility] OR     LAPAROSCOPY DIAGNOSTIC (GENERAL) N/A 4/16/2018    Procedure: LAPAROSCOPY DIAGNOSTIC (GENERAL);  Diagnostic laparoscopy and lysis of adhesions;  Surgeon: Prince Dowling MD;  Location: [facility] OR     OPTICAL TRACKING SYSTEM CRANIOTOMY, EXCISE TUMOR, COMBINED Left 4/10/2015    Procedure: COMBINED OPTICAL TRACKING SYSTEM CRANIOTOMY, EXCISE TUMOR;  Surgeon: Mirlande Colmenares MD;  Location: [facility] OR     REPAIR GAMEKEEPER'S THUMB Right 12/2/2016    Procedure: REPAIR LIGAMENT ULNAR COLLATERAL THUMB (GAMEKEEPER'S);  Surgeon: Evin Zamorano MD;  Location: [facility]       Physical Exam  /85   Pulse 109   Ht 1.626 m (5' 4\")   Wt 75.3 kg (166 lb)   BMI 28.49 kg/m    Body mass index is 28.49 kg/m .    GENERAL : In no apparent distress  FEET:  No ulcers; normal monofilamentous exam.    RESULTS  Creatinine   Date Value Ref Range Status   06/07/2019 0.82 0.66 - 1.25 mg/dL Final     GFR Estimate   Date Value Ref Range Status   06/07/2019 >90 >60 mL/min/[1.73_m2] Final     Comment:     Non  GFR Calc  Starting 12/18/2018, serum creatinine based estimated GFR (eGFR) will be "   calculated using the Chronic Kidney Disease Epidemiology Collaboration   (CKD-EPI) equation.       Hemoglobin A1C   Date Value Ref Range Status   06/07/2019 10.2 (H) 0 - 5.6 % Final     Comment:     Normal <5.7% Prediabetes 5.7-6.4%  Diabetes 6.5% or higher - adopted from ADA   consensus guidelines.       Potassium   Date Value Ref Range Status   06/07/2019 3.9 3.4 - 5.3 mmol/L Final     ALT   Date Value Ref Range Status   06/07/2019 24 0 - 70 U/L Final     AST   Date Value Ref Range Status   06/07/2019 15 0 - 45 U/L Final     TSH   Date Value Ref Range Status   02/15/2018 3.57 0.40 - 4.00 mU/L Final     T4 Free   Date Value Ref Range Status   04/14/2015 1.02 0.76 - 1.46 ng/dL Final       Cholesterol   Date Value Ref Range Status   08/15/2018 180 <200 mg/dL Final   08/11/2017 194 <200 mg/dL Final     HDL Cholesterol   Date Value Ref Range Status   08/15/2018 38 (L) >39 mg/dL Final   08/11/2017 37 (L) >39 mg/dL Final     LDL Cholesterol Calculated   Date Value Ref Range Status   08/15/2018 105 (H) <100 mg/dL Final     Comment:     Above desirable:  100-129 mg/dl  Borderline High:  130-159 mg/dL  High:             160-189 mg/dL  Very high:       >189 mg/dl     08/11/2017 88 <100 mg/dL Final     Comment:     Desirable:       <100 mg/dl     Triglycerides   Date Value Ref Range Status   08/15/2018 190 (H) <150 mg/dL Final     Comment:     Borderline high:  150-199 mg/dl  High:             200-499 mg/dl  Very high:       >499 mg/dl     08/11/2017 345 (H) <150 mg/dL Final     Comment:     Borderline high:  150-199 mg/dl   High:             200-499 mg/dl   Very high:       >499 mg/dl       Cholesterol/HDL Ratio   Date Value Ref Range Status   05/07/2015 5.0 0.0 - 5.0 Final   04/18/2005 6.3 (H) 0.0 - 5.0 Final     Lab Results   Component Value Date    A1C 6.0 08/11/2017    A1C 6.2 05/23/2017    A1C 6.4 01/23/2017    A1C 7.0 08/18/2016    A1C 8.3 06/16/2016     A1C  10.2 %.    ASSESSMENT/PLAN:    1. TYPE 2 DIABETES  MELLITUS:  Uncontrolled type 2 diabetes mellitus.  Pt instructed to take Basaglar 80 units subcutaneous each am and start Novolog 5 units with meals.  He is to meet with our CDE today to learn how to use meal time insulin.  His unused insulin will be stored in the refrigerator at the pharmacy per patient.  He is to check his fasting blood sugar each am and blood sugar before lunch and dinner DAILY.  He was instructed to see me in clinic with his glucose meter in 5 weeks.  He is normotensive today.  Referral to Podiatry placed today.    2. HIV/AIDS: Pt is followed here by ID staff.    3.  Return to Endocrine Clinic to see me in 5 weeks.

## 2019-06-13 ENCOUNTER — TELEPHONE (OUTPATIENT)
Dept: PODIATRY | Facility: CLINIC | Age: 54
End: 2019-06-13

## 2019-06-14 ENCOUNTER — OFFICE VISIT (OUTPATIENT)
Dept: PHARMACY | Facility: CLINIC | Age: 54
End: 2019-06-14
Payer: COMMERCIAL

## 2019-06-14 VITALS — TEMPERATURE: 98 F | SYSTOLIC BLOOD PRESSURE: 127 MMHG | DIASTOLIC BLOOD PRESSURE: 84 MMHG

## 2019-06-14 DIAGNOSIS — E11.65 TYPE 2 DIABETES MELLITUS WITH HYPERGLYCEMIA, WITHOUT LONG-TERM CURRENT USE OF INSULIN (H): ICD-10-CM

## 2019-06-14 DIAGNOSIS — Z21 HUMAN IMMUNODEFICIENCY VIRUS I INFECTION (H): Primary | ICD-10-CM

## 2019-06-14 PROCEDURE — 99605 MTMS BY PHARM NP 15 MIN: CPT | Performed by: PHARMACIST

## 2019-06-14 PROCEDURE — 99607 MTMS BY PHARM ADDL 15 MIN: CPT | Performed by: PHARMACIST
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
## 2019-06-18 ENCOUNTER — TELEPHONE (OUTPATIENT)
Dept: SURGERY | Facility: CLINIC | Age: 54
End: 2019-06-18

## 2019-06-18 NOTE — TELEPHONE ENCOUNTER
Attempting to locate/contact patent to schedule a procedure and related appointments. Tried patient on provided cell number, although notes indicate this may not be in service. Generic vm greeting reached, left HIPAA-compliant message for return call. Will send another email to patient requesting that he contact me for scheduling. Will also try to catch patient at next appointment scheduled at [facility] on 6/27.

## 2019-06-26 ENCOUNTER — HOSPITAL ENCOUNTER (EMERGENCY)
Facility: CLINIC | Age: 54
Discharge: HOME | End: 2019-06-26
Attending: EMERGENCY MEDICINE | Admitting: EMERGENCY MEDICINE
Payer: COMMERCIAL

## 2019-06-26 VITALS
OXYGEN SATURATION: 100 % | RESPIRATION RATE: 12 BRPM | HEART RATE: 83 BPM | BODY MASS INDEX: 28.19 KG/M2 | DIASTOLIC BLOOD PRESSURE: 84 MMHG | SYSTOLIC BLOOD PRESSURE: 119 MMHG | WEIGHT: 164.24 LBS | TEMPERATURE: 98.4 F

## 2019-06-26 DIAGNOSIS — R51.9 HEADACHE, UNSPECIFIED HEADACHE TYPE: ICD-10-CM

## 2019-06-26 LAB
ANION GAP SERPL CALCULATED.3IONS-SCNC: 8 MMOL/L (ref 3–14)
BUN SERPL-MCNC: 25 MG/DL (ref 7–30)
CALCIUM SERPL-MCNC: 9.2 MG/DL (ref 8.5–10.1)
CHLORIDE SERPL-SCNC: 106 MMOL/L (ref 94–109)
CO2 SERPL-SCNC: 24 MMOL/L (ref 20–32)
CREAT SERPL-MCNC: 0.71 MG/DL (ref 0.66–1.25)
GFR SERPL CREATININE-BSD FRML MDRD: >90 ML/MIN/{1.73_M2}
GLUCOSE BLDC GLUCOMTR-MCNC: 135 MG/DL (ref 70–99)
GLUCOSE SERPL-MCNC: 129 MG/DL (ref 70–99)
POTASSIUM SERPL-SCNC: 3.3 MMOL/L (ref 3.4–5.3)
SODIUM SERPL-SCNC: 137 MMOL/L (ref 133–144)

## 2019-06-26 PROCEDURE — 00000146 ZZHCL STATISTIC GLUCOSE BY METER IP

## 2019-06-26 PROCEDURE — 99284 EMERGENCY DEPT VISIT MOD MDM: CPT | Mod: Z6 | Performed by: EMERGENCY MEDICINE

## 2019-06-26 PROCEDURE — 96374 THER/PROPH/DIAG INJ IV PUSH: CPT | Performed by: EMERGENCY MEDICINE

## 2019-06-26 PROCEDURE — 80048 BASIC METABOLIC PNL TOTAL CA: CPT | Performed by: EMERGENCY MEDICINE

## 2019-06-26 PROCEDURE — 96361 HYDRATE IV INFUSION ADD-ON: CPT | Performed by: EMERGENCY MEDICINE

## 2019-06-26 PROCEDURE — 96375 TX/PRO/DX INJ NEW DRUG ADDON: CPT | Performed by: EMERGENCY MEDICINE

## 2019-06-26 PROCEDURE — 99284 EMERGENCY DEPT VISIT MOD MDM: CPT | Mod: 25 | Performed by: EMERGENCY MEDICINE

## 2019-06-26 PROCEDURE — 25000128 H RX IP 250 OP 636: Performed by: EMERGENCY MEDICINE

## 2019-06-26 RX ORDER — METOCLOPRAMIDE HYDROCHLORIDE 5 MG/ML
10 INJECTION INTRAMUSCULAR; INTRAVENOUS ONCE
Status: COMPLETED | OUTPATIENT
Start: 2019-06-26 | End: 2019-06-26

## 2019-06-26 RX ORDER — DIPHENHYDRAMINE HYDROCHLORIDE 50 MG/ML
25 INJECTION INTRAMUSCULAR; INTRAVENOUS ONCE
Status: COMPLETED | OUTPATIENT
Start: 2019-06-26 | End: 2019-06-26

## 2019-06-26 RX ORDER — KETOROLAC TROMETHAMINE 15 MG/ML
15 INJECTION, SOLUTION INTRAMUSCULAR; INTRAVENOUS ONCE
Status: COMPLETED | OUTPATIENT
Start: 2019-06-26 | End: 2019-06-26

## 2019-06-26 RX ADMIN — SODIUM CHLORIDE 1000 ML: 9 INJECTION, SOLUTION INTRAVENOUS at 06:27

## 2019-06-26 RX ADMIN — KETOROLAC TROMETHAMINE 15 MG: 15 INJECTION, SOLUTION INTRAMUSCULAR; INTRAVENOUS at 06:43

## 2019-06-26 RX ADMIN — DIPHENHYDRAMINE HYDROCHLORIDE 25 MG: 50 INJECTION INTRAMUSCULAR; INTRAVENOUS at 06:28

## 2019-06-26 RX ADMIN — METOCLOPRAMIDE 10 MG: 5 INJECTION, SOLUTION INTRAMUSCULAR; INTRAVENOUS at 06:27

## 2019-06-26 NOTE — DISCHARGE INSTRUCTIONS
Please make an appointment to follow up with Your Primary Care Provider in 2-4 days unless symptoms completely resolved and do not recur.     Return to the ED if you are having fever, vision change, worsening symptoms, or any urgent/life-threatening concerns.

## 2019-06-26 NOTE — ED NOTES
10:27 AM  Patient signed out to me as a client who had presented with headache who had been treated with Toradol, metoclopramide and Benadryl with planned reassessment.  Patient was seen and examined.  He had been sleeping soundly and states that he no longer has a headache.  He will be discharged to follow-up with his primary physician.     Sohail Ramírez MD  06/26/19 102
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
## 2019-06-26 NOTE — ED PROVIDER NOTES
"  History     Chief Complaint   Patient presents with     Headache     HPI  Andrew Lockwood is a 51 year old male with PMH notable for AIDS, DM2, toxoplasma encephalitis who presents to the ED with headache. Onset 2130 this past evening. Onset was gradual. No recent head trauma. No fever. Patient states, \"a little blurry\" for vision change. No photophobia nor phonophobia, no eye pain. Patient points to bilateral temples and forehead as location of the pain. He has taken no medications for the headache, just insulin.     I have reviewed the Medications, Allergies, Past Medical and Surgical History, and Social History in the Epic system.    Review of Systems  A complete review of systems was performed with pertinent positives and negatives noted in the HPI, and all other systems negative.     Physical Exam   BP: (!) 124/91  Pulse: 100  Temp: 98.4  F (36.9  C)  Resp: 18  Weight: 74.5 kg (164 lb 3.9 oz)  SpO2: 100 %    Physical Exam  General: no acute distress. Appears stated age.   HENT: MMM, no oropharyngeal lesions. No tenderness over the temporal arteries, no pain with percussion of the sinuses.   Eyes: PERRL, normal sclerae. Full visual fields with grossly normal vision.  Neck: non-tender, supple  Cardio: regular rate. Regular rhythm. Extremities well perfused  Resp: Normal work of breathing, clear breath sounds  Abdomen: no tenderness, non-distended, no rebound, no guarding  Neuro: alert and fully oriented. No confusion. CN II-XII intact to testing. Strength left: 5/5 , 5/5 elbow flexion, 5/5 elbow extension, 5/5 shoulder abduction, 5/5 hip flexion, 5/5 knee flexion, 5/5 knee extension. Strength right: 5/5 , 5/5 elbow flexion, 5/5 elbow extension, 5/5 shoulder abduction, 5/5 hip flexion, 5/5 knee flexion, 5/5 knee extension. Sensation intact to soft touch in all extremities. No pronator drift. 2+ brachial and patellar reflexes. Normal tone, no clonus. Normal gait.   MSK: no deformities.   Integumentary/Skin: " no rash visualized, normal color  Psych: normal affect, normal behavior    ED Course      Procedures        Critical Care time:  none       Labs Ordered and Resulted from Time of ED Arrival Up to the Time of Departure from the ED   GLUCOSE BY METER - Abnormal; Notable for the following components:       Result Value    Glucose 135 (*)     All other components within normal limits   BASIC METABOLIC PANEL - Abnormal; Notable for the following components:    Potassium 3.3 (*)     Glucose 129 (*)     All other components within normal limits   PERIPHERAL IV CATHETER            Assessments & Plan (with Medical Decision Making)   Patient presenting with headache of about 9 hours duration. Exam non-focal. Vitals in the ED with initial borderline tachycardia, otherwise unremarkable. Initial differential diagnosis includes but not limited to migraine headache, tension headache, temporal arteritis, intracranial hemorrhage.     To tenderness over the temporal arteries to suggest temporal arteritis. no trauma to suggest SDH or EDH. Not thunderclap onset to suggest aneurismal SAH. no fevers, neck stiffness, nor confusion to suggest meningitis/encephalitis. CD4 of 417 a few weeks ago. Electrolytes with mild hypokalemia 3.3, glucose 129. Visual fields intact. No significant change in visual acuity.     The patient was given IV fluids, ketorolac, metoclopramide, diphenhydramine for headache. Patient signed out to oncoming provider with plan for reassessment of headache, likely discharge if headache resolving. Potential broadening of work-up if indicated.     Final diagnoses:   Headache, unspecified headache type       --  Lzáaro Callejas   Emergency Medicine     I have reviewed the nursing notes.  I have reviewed the findings, diagnosis, plan and need for follow up with the patient.  6/26/2019   [facility], [place], EMERGENCY DEPARTMENT     Lázaro Callejas MD  06/26/19 0704

## 2019-06-26 NOTE — ED AVS SNAPSHOT
[facility], [place], Emergency Department  [address] [place] [id number]  Phone:  [phone]                                    Andrew Lockwood   MRN: [id number]    Department:  [facility], Emergency Department   Date of Visit:  6/26/2019           After Visit Summary Signature Page    I have received my discharge instructions, and my questions have been answered. I have discussed any challenges I see with this plan with the nurse or doctor.    ..........................................................................................................................................  Patient/Patient Representative Signature      ..........................................................................................................................................  Patient Representative Print Name and Relationship to Patient    ..................................................               ................................................  Date                                   Time    ..........................................................................................................................................  Reviewed by Signature/Title    ...................................................              ..............................................  Date                                               Time          22EPIC Rev 08/18

## 2019-06-26 NOTE — ED TRIAGE NOTES
"Per patient report headache since 2130. \" I see a blind.\" No neuro deficits. Patient ambulated to treatment area steady oin feet. Patient denies nausea, photosensitivity. Patient reports migraines \" when younger.\"   "

## 2019-06-27 ENCOUNTER — TELEPHONE (OUTPATIENT)
Dept: SURGERY | Facility: CLINIC | Age: 54
End: 2019-06-27

## 2019-06-27 ENCOUNTER — ALLIED HEALTH/NURSE VISIT (OUTPATIENT)
Dept: EDUCATION SERVICES | Facility: CLINIC | Age: 54
End: 2019-06-27
Payer: COMMERCIAL

## 2019-06-27 DIAGNOSIS — E11.65 TYPE 2 DIABETES MELLITUS WITH HYPERGLYCEMIA, WITHOUT LONG-TERM CURRENT USE OF INSULIN (H): Primary | ICD-10-CM

## 2019-06-27 NOTE — PATIENT INSTRUCTIONS
1.  Test your blood sugar before each meal and at bedtime.  Your blood sugar goals are: <150    2.  Take Novolog 5 units before each meal.    3.  Treat low blood sugar with a juice cup.    4, Return July 2 at 12 noon with your meter.    Joanne Barrow RN,CDE  [address] [address]  Phone: [phone]  [email or web address]
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
## 2019-06-27 NOTE — TELEPHONE ENCOUNTER
Since patient has no working phone, no address to receive mail, and has not responded to the emails sent to him, I stopped down to the lobby on the 4th floor of the [facility] before patient's appointment scheduled at 11:30 am today. Met with patient, still homeless and no phone, no address, but did get my emails. Patient did not respond to the emails because he still does not have someone to monitor him for 24 hours after his procedure, including picking him up from his appointment. Patient states that in the past he was able to use a room here for that purpose, and is supposed to talk to someone about that here today. We discussed possible surgery/procedure dates of 7/3 am or 7/12 pm with Dr. Lundberg at the [facility]. Verified patient's email address and emailed this info to patient as well. Patient states he will respond to my email as soon as he finds out whether one of the suggested possible dates might work.

## 2019-07-03 ENCOUNTER — HOSPITAL ENCOUNTER (EMERGENCY)
Facility: CLINIC | Age: 54
Discharge: HOME | End: 2019-07-03
Attending: EMERGENCY MEDICINE | Admitting: EMERGENCY MEDICINE
Payer: COMMERCIAL

## 2019-07-03 ENCOUNTER — ANCILLARY PROCEDURE (OUTPATIENT)
Dept: GENERAL RADIOLOGY | Facility: CLINIC | Age: 54
End: 2019-07-03
Attending: FAMILY MEDICINE
Payer: COMMERCIAL

## 2019-07-03 ENCOUNTER — PATIENT OUTREACH (OUTPATIENT)
Dept: CARE COORDINATION | Facility: CLINIC | Age: 54
End: 2019-07-03

## 2019-07-03 ENCOUNTER — OFFICE VISIT (OUTPATIENT)
Dept: ORTHOPEDICS | Facility: CLINIC | Age: 54
End: 2019-07-03
Payer: COMMERCIAL

## 2019-07-03 ENCOUNTER — APPOINTMENT (OUTPATIENT)
Dept: CT IMAGING | Facility: CLINIC | Age: 54
End: 2019-07-03
Attending: EMERGENCY MEDICINE
Payer: COMMERCIAL

## 2019-07-03 VITALS
BODY MASS INDEX: 28 KG/M2 | HEIGHT: 64 IN | WEIGHT: 164 LBS | HEART RATE: 83 BPM | SYSTOLIC BLOOD PRESSURE: 119 MMHG | DIASTOLIC BLOOD PRESSURE: 84 MMHG

## 2019-07-03 VITALS
TEMPERATURE: 98.4 F | BODY MASS INDEX: 28.68 KG/M2 | SYSTOLIC BLOOD PRESSURE: 115 MMHG | OXYGEN SATURATION: 97 % | DIASTOLIC BLOOD PRESSURE: 80 MMHG | WEIGHT: 167.11 LBS | HEART RATE: 84 BPM | RESPIRATION RATE: 16 BRPM

## 2019-07-03 DIAGNOSIS — M79.672 ACUTE FOOT PAIN, LEFT: ICD-10-CM

## 2019-07-03 DIAGNOSIS — S06.0X0A CONCUSSION WITHOUT LOSS OF CONSCIOUSNESS, INITIAL ENCOUNTER: ICD-10-CM

## 2019-07-03 DIAGNOSIS — M79.672 ACUTE FOOT PAIN, LEFT: Primary | ICD-10-CM

## 2019-07-03 DIAGNOSIS — R73.9 HYPERGLYCEMIA: ICD-10-CM

## 2019-07-03 LAB
ALBUMIN SERPL-MCNC: 3.2 G/DL (ref 3.4–5)
ALBUMIN UR-MCNC: NEGATIVE MG/DL
ALCOHOL BREATH TEST: 0 (ref 0–0.01)
ALP SERPL-CCNC: 124 U/L (ref 40–150)
ALT SERPL W P-5'-P-CCNC: 35 U/L (ref 0–70)
ANION GAP SERPL CALCULATED.3IONS-SCNC: 7 MMOL/L (ref 3–14)
APPEARANCE UR: CLEAR
AST SERPL W P-5'-P-CCNC: 35 U/L (ref 0–45)
BASOPHILS # BLD AUTO: 0 10E9/L (ref 0–0.2)
BASOPHILS NFR BLD AUTO: 0.4 %
BILIRUB SERPL-MCNC: 0.5 MG/DL (ref 0.2–1.3)
BILIRUB UR QL STRIP: NEGATIVE
BUN SERPL-MCNC: 11 MG/DL (ref 7–30)
CALCIUM SERPL-MCNC: 8.4 MG/DL (ref 8.5–10.1)
CHLORIDE SERPL-SCNC: 100 MMOL/L (ref 94–109)
CO2 BLDCOV-SCNC: 28 MMOL/L (ref 21–28)
CO2 SERPL-SCNC: 26 MMOL/L (ref 20–32)
COLOR UR AUTO: ABNORMAL
CREAT SERPL-MCNC: 0.7 MG/DL (ref 0.66–1.25)
DIFFERENTIAL METHOD BLD: ABNORMAL
EOSINOPHIL # BLD AUTO: 0.2 10E9/L (ref 0–0.7)
EOSINOPHIL NFR BLD AUTO: 2.9 %
ERYTHROCYTE [DISTWIDTH] IN BLOOD BY AUTOMATED COUNT: 13.1 % (ref 10–15)
GFR SERPL CREATININE-BSD FRML MDRD: >90 ML/MIN/{1.73_M2}
GLUCOSE BLDC GLUCOMTR-MCNC: 301 MG/DL (ref 70–99)
GLUCOSE BLDC GLUCOMTR-MCNC: 390 MG/DL (ref 70–99)
GLUCOSE SERPL-MCNC: 424 MG/DL (ref 70–99)
GLUCOSE UR STRIP-MCNC: >1000 MG/DL
HCT VFR BLD AUTO: 39.6 % (ref 40–53)
HGB BLD-MCNC: 12.8 G/DL (ref 13.3–17.7)
HGB UR QL STRIP: NEGATIVE
IMM GRANULOCYTES # BLD: 0.1 10E9/L (ref 0–0.4)
IMM GRANULOCYTES NFR BLD: 1 %
KETONES UR STRIP-MCNC: NEGATIVE MG/DL
LACTATE BLD-SCNC: 1.8 MMOL/L (ref 0.7–2.1)
LEUKOCYTE ESTERASE UR QL STRIP: NEGATIVE
LYMPHOCYTES # BLD AUTO: 1.8 10E9/L (ref 0.8–5.3)
LYMPHOCYTES NFR BLD AUTO: 35.2 %
MCH RBC QN AUTO: 30.6 PG (ref 26.5–33)
MCHC RBC AUTO-ENTMCNC: 32.3 G/DL (ref 31.5–36.5)
MCV RBC AUTO: 95 FL (ref 78–100)
MONOCYTES # BLD AUTO: 0.4 10E9/L (ref 0–1.3)
MONOCYTES NFR BLD AUTO: 7.4 %
MUCOUS THREADS #/AREA URNS LPF: PRESENT /LPF
NEUTROPHILS # BLD AUTO: 2.8 10E9/L (ref 1.6–8.3)
NEUTROPHILS NFR BLD AUTO: 53.1 %
NITRATE UR QL: NEGATIVE
NRBC # BLD AUTO: 0 10*3/UL
NRBC BLD AUTO-RTO: 0 /100
PCO2 BLDV: 46 MM HG (ref 40–50)
PH BLDV: 7.38 PH (ref 7.32–7.43)
PH UR STRIP: 6.5 PH (ref 5–7)
PLATELET # BLD AUTO: 277 10E9/L (ref 150–450)
PO2 BLDV: 51 MM HG (ref 25–47)
POTASSIUM SERPL-SCNC: 3.6 MMOL/L (ref 3.4–5.3)
PROT SERPL-MCNC: 6.5 G/DL (ref 6.8–8.8)
RBC # BLD AUTO: 4.18 10E12/L (ref 4.4–5.9)
RBC #/AREA URNS AUTO: <1 /HPF (ref 0–2)
SAO2 % BLDV FROM PO2: 85 %
SODIUM SERPL-SCNC: 134 MMOL/L (ref 133–144)
SOURCE: ABNORMAL
SP GR UR STRIP: 1.02 (ref 1–1.03)
UROBILINOGEN UR STRIP-MCNC: NORMAL MG/DL (ref 0–2)
WBC # BLD AUTO: 5.2 10E9/L (ref 4–11)
WBC #/AREA URNS AUTO: <1 /HPF (ref 0–5)

## 2019-07-03 PROCEDURE — 25000131 ZZH RX MED GY IP 250 OP 636 PS 637: Performed by: EMERGENCY MEDICINE

## 2019-07-03 PROCEDURE — 99284 EMERGENCY DEPT VISIT MOD MDM: CPT | Mod: 25 | Performed by: EMERGENCY MEDICINE

## 2019-07-03 PROCEDURE — 25000128 H RX IP 250 OP 636: Performed by: EMERGENCY MEDICINE

## 2019-07-03 PROCEDURE — 82075 ASSAY OF BREATH ETHANOL: CPT | Performed by: EMERGENCY MEDICINE

## 2019-07-03 PROCEDURE — 99284 EMERGENCY DEPT VISIT MOD MDM: CPT | Mod: Z6 | Performed by: EMERGENCY MEDICINE

## 2019-07-03 PROCEDURE — 85025 COMPLETE CBC W/AUTO DIFF WBC: CPT | Performed by: EMERGENCY MEDICINE

## 2019-07-03 PROCEDURE — 25800030 ZZH RX IP 258 OP 636: Performed by: EMERGENCY MEDICINE

## 2019-07-03 PROCEDURE — 96360 HYDRATION IV INFUSION INIT: CPT

## 2019-07-03 PROCEDURE — 70450 CT HEAD/BRAIN W/O DYE: CPT

## 2019-07-03 PROCEDURE — 96372 THER/PROPH/DIAG INJ SC/IM: CPT | Mod: 59 | Performed by: EMERGENCY MEDICINE

## 2019-07-03 PROCEDURE — 00000146 ZZHCL STATISTIC GLUCOSE BY METER IP

## 2019-07-03 PROCEDURE — 83605 ASSAY OF LACTIC ACID: CPT

## 2019-07-03 PROCEDURE — 81001 URINALYSIS AUTO W/SCOPE: CPT | Performed by: EMERGENCY MEDICINE

## 2019-07-03 PROCEDURE — 80053 COMPREHEN METABOLIC PANEL: CPT | Performed by: EMERGENCY MEDICINE

## 2019-07-03 PROCEDURE — 96361 HYDRATE IV INFUSION ADD-ON: CPT | Performed by: EMERGENCY MEDICINE

## 2019-07-03 PROCEDURE — 82803 BLOOD GASES ANY COMBINATION: CPT

## 2019-07-03 RX ORDER — SODIUM CHLORIDE 9 MG/ML
1000 INJECTION, SOLUTION INTRAVENOUS CONTINUOUS
Status: DISCONTINUED | OUTPATIENT
Start: 2019-07-03 | End: 2019-07-03 | Stop reason: HOSPADM

## 2019-07-03 RX ADMIN — INSULIN ASPART 3 UNITS: 100 INJECTION, SOLUTION INTRAVENOUS; SUBCUTANEOUS at 14:56

## 2019-07-03 RX ADMIN — SODIUM CHLORIDE 1000 ML: 9 INJECTION, SOLUTION INTRAVENOUS at 12:45

## 2019-07-03 RX ADMIN — SODIUM CHLORIDE 1000 ML: 9 INJECTION, SOLUTION INTRAVENOUS at 14:18

## 2019-07-03 RX ADMIN — INSULIN GLARGINE 80 UNITS: 100 INJECTION, SOLUTION SUBCUTANEOUS at 14:18

## 2019-07-03 ASSESSMENT — ENCOUNTER SYMPTOMS
NUMBNESS: 0
NECK PAIN: 0
HEADACHES: 1
WEAKNESS: 0
SHORTNESS OF BREATH: 0
VOMITING: 0
NAUSEA: 0

## 2019-07-03 ASSESSMENT — MIFFLIN-ST. JEOR: SCORE: 1499.9
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
## 2019-07-03 NOTE — ED AVS SNAPSHOT
[facility], [place], Emergency Department  [address] [place] [address]  Phone:  [phone]                                    Andrew Lockwood   MRN: [id number]    Department:  [facility], Emergency Department   Date of Visit:  7/3/2019           After Visit Summary Signature Page    I have received my discharge instructions, and my questions have been answered. I have discussed any challenges I see with this plan with the nurse or doctor.    ..........................................................................................................................................  Patient/Patient Representative Signature      ..........................................................................................................................................  Patient Representative Print Name and Relationship to Patient    ..................................................               ................................................  Date                                   Time    ..........................................................................................................................................  Reviewed by Signature/Title    ...................................................              ..............................................  Date                                               Time          22EPIC Rev 08/18
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
## 2019-07-03 NOTE — PROGRESS NOTES
Social Work Intervention  [facility]    Data/Intervention:    Patient Name:  Andrew Lockwood  /Age:  1967 (51 year old)    Visit Type: in person  Referral Source: Andrew  Reason for Referral:  Bus pass    Collaborated With:    -Andrew  -hand off to Simon GARCIA    Patient Concerns/Issues:   Pt requesting bus passes. He indicated his  provides about 10/month and he has used those. Pt is walking with a boot on his boot due to an injury. He's living at [facility]. Waiting to get an apartment but displeased with his  re how long it's taking.    Intervention/Education/Resources Provided:  Provided 10 bus tokens.    Assessment/Plan:  Provided hand off to Simon Cadena who is out of the office re this encounter.    RADHA Bowman, [facility]    [facility]  Cl[facility] and [facility]  [phone]
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
## 2019-07-03 NOTE — DISCHARGE INSTRUCTIONS
Please make an appointment to follow up with Your Primary Care Provider in 2-3 days if not improving.  You have been referred to [facility]  service.  The  Representative will contact you within one business day, or you may contact the  Representative at [phone].    Rest and avoid any strenuous activities until symptoms are improving.  If you have any worsening pain, fevers, neck pain, vomiting or other concerns return to the emergency department for re-evaluation.      Your blood sugar was high today though was without taking insulin.  Take your home insulin as prescribed.  Increase oral fluids (drink 8 glasses of water a day) to stay well hydrated and keep blood sugar down.
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
## 2019-07-06 ENCOUNTER — HOSPITAL ENCOUNTER (EMERGENCY)
Facility: CLINIC | Age: 54
Discharge: HOME | End: 2019-07-07
Attending: EMERGENCY MEDICINE | Admitting: EMERGENCY MEDICINE
Payer: COMMERCIAL

## 2019-07-06 DIAGNOSIS — R73.9 HYPERGLYCEMIA: ICD-10-CM

## 2019-07-06 LAB — GLUCOSE BLDC GLUCOMTR-MCNC: 501 MG/DL (ref 70–99)

## 2019-07-06 PROCEDURE — 99284 EMERGENCY DEPT VISIT MOD MDM: CPT | Mod: 25 | Performed by: EMERGENCY MEDICINE

## 2019-07-06 PROCEDURE — 00000146 ZZHCL STATISTIC GLUCOSE BY METER IP

## 2019-07-06 PROCEDURE — 99284 EMERGENCY DEPT VISIT MOD MDM: CPT | Mod: Z6 | Performed by: EMERGENCY MEDICINE

## 2019-07-06 ASSESSMENT — ENCOUNTER SYMPTOMS: ABDOMINAL PAIN: 1

## 2019-07-06 NOTE — ED AVS SNAPSHOT
[facility], [place], Emergency Department  [address] [place] [id number]  Phone:  [phone]                                    Andrew Lockwood   MRN: [id number]    Department:  [facility], Emergency Department   Date of Visit:  7/6/2019           After Visit Summary Signature Page    I have received my discharge instructions, and my questions have been answered. I have discussed any challenges I see with this plan with the nurse or doctor.    ..........................................................................................................................................  Patient/Patient Representative Signature      ..........................................................................................................................................  Patient Representative Print Name and Relationship to Patient    ..................................................               ................................................  Date                                   Time    ..........................................................................................................................................  Reviewed by Signature/Title    ...................................................              ..............................................  Date                                               Time          22EPIC Rev 08/18

## 2019-07-07 VITALS
TEMPERATURE: 98.7 F | DIASTOLIC BLOOD PRESSURE: 68 MMHG | OXYGEN SATURATION: 98 % | RESPIRATION RATE: 16 BRPM | SYSTOLIC BLOOD PRESSURE: 134 MMHG

## 2019-07-07 LAB
ALBUMIN SERPL-MCNC: 3.2 G/DL (ref 3.4–5)
ALP SERPL-CCNC: 180 U/L (ref 40–150)
ALT SERPL W P-5'-P-CCNC: 35 U/L (ref 0–70)
ANION GAP SERPL CALCULATED.3IONS-SCNC: 8 MMOL/L (ref 3–14)
AST SERPL W P-5'-P-CCNC: 22 U/L (ref 0–45)
BASOPHILS # BLD AUTO: 0 10E9/L (ref 0–0.2)
BASOPHILS NFR BLD AUTO: 0.7 %
BILIRUB SERPL-MCNC: 0.4 MG/DL (ref 0.2–1.3)
BUN SERPL-MCNC: 18 MG/DL (ref 7–30)
CALCIUM SERPL-MCNC: 9 MG/DL (ref 8.5–10.1)
CHLORIDE SERPL-SCNC: 100 MMOL/L (ref 94–109)
CO2 SERPL-SCNC: 28 MMOL/L (ref 20–32)
CREAT SERPL-MCNC: 0.86 MG/DL (ref 0.66–1.25)
DIFFERENTIAL METHOD BLD: ABNORMAL
EOSINOPHIL # BLD AUTO: 0.2 10E9/L (ref 0–0.7)
EOSINOPHIL NFR BLD AUTO: 3.3 %
ERYTHROCYTE [DISTWIDTH] IN BLOOD BY AUTOMATED COUNT: 12.9 % (ref 10–15)
GFR SERPL CREATININE-BSD FRML MDRD: >90 ML/MIN/{1.73_M2}
GLUCOSE BLDC GLUCOMTR-MCNC: 426 MG/DL (ref 70–99)
GLUCOSE SERPL-MCNC: 522 MG/DL (ref 70–99)
HCT VFR BLD AUTO: 39 % (ref 40–53)
HGB BLD-MCNC: 12.8 G/DL (ref 13.3–17.7)
IMM GRANULOCYTES # BLD: 0.1 10E9/L (ref 0–0.4)
IMM GRANULOCYTES NFR BLD: 1.2 %
LIPASE SERPL-CCNC: 241 U/L (ref 73–393)
LYMPHOCYTES # BLD AUTO: 1.7 10E9/L (ref 0.8–5.3)
LYMPHOCYTES NFR BLD AUTO: 30.3 %
MCH RBC QN AUTO: 30.6 PG (ref 26.5–33)
MCHC RBC AUTO-ENTMCNC: 32.8 G/DL (ref 31.5–36.5)
MCV RBC AUTO: 93 FL (ref 78–100)
MONOCYTES # BLD AUTO: 0.4 10E9/L (ref 0–1.3)
MONOCYTES NFR BLD AUTO: 7.7 %
NEUTROPHILS # BLD AUTO: 3.3 10E9/L (ref 1.6–8.3)
NEUTROPHILS NFR BLD AUTO: 56.8 %
NRBC # BLD AUTO: 0 10*3/UL
NRBC BLD AUTO-RTO: 0 /100
PLATELET # BLD AUTO: 312 10E9/L (ref 150–450)
POTASSIUM SERPL-SCNC: 3.6 MMOL/L (ref 3.4–5.3)
PROT SERPL-MCNC: 6.7 G/DL (ref 6.8–8.8)
RBC # BLD AUTO: 4.18 10E12/L (ref 4.4–5.9)
SODIUM SERPL-SCNC: 135 MMOL/L (ref 133–144)
WBC # BLD AUTO: 5.8 10E9/L (ref 4–11)

## 2019-07-07 PROCEDURE — 96361 HYDRATE IV INFUSION ADD-ON: CPT | Performed by: EMERGENCY MEDICINE

## 2019-07-07 PROCEDURE — 25000132 ZZH RX MED GY IP 250 OP 250 PS 637: Performed by: EMERGENCY MEDICINE

## 2019-07-07 PROCEDURE — 00000146 ZZHCL STATISTIC GLUCOSE BY METER IP

## 2019-07-07 PROCEDURE — 96374 THER/PROPH/DIAG INJ IV PUSH: CPT | Performed by: EMERGENCY MEDICINE

## 2019-07-07 PROCEDURE — 85025 COMPLETE CBC W/AUTO DIFF WBC: CPT | Performed by: EMERGENCY MEDICINE

## 2019-07-07 PROCEDURE — 83690 ASSAY OF LIPASE: CPT | Performed by: EMERGENCY MEDICINE

## 2019-07-07 PROCEDURE — 80053 COMPREHEN METABOLIC PANEL: CPT | Performed by: EMERGENCY MEDICINE

## 2019-07-07 PROCEDURE — 25000131 ZZH RX MED GY IP 250 OP 636 PS 637: Performed by: EMERGENCY MEDICINE

## 2019-07-07 PROCEDURE — 25000128 H RX IP 250 OP 636: Performed by: EMERGENCY MEDICINE

## 2019-07-07 RX ADMIN — INSULIN GLARGINE 40 UNITS: 100 INJECTION, SOLUTION SUBCUTANEOUS at 02:59

## 2019-07-07 RX ADMIN — HUMAN INSULIN 8 UNITS: 100 INJECTION, SOLUTION SUBCUTANEOUS at 02:05

## 2019-07-07 RX ADMIN — SODIUM CHLORIDE 1000 ML: 900 INJECTION, SOLUTION INTRAVENOUS at 01:58

## 2019-07-07 RX ADMIN — SODIUM CHLORIDE 1000 ML: 900 INJECTION, SOLUTION INTRAVENOUS at 00:10

## 2019-07-07 NOTE — ED PROVIDER NOTES
[place] EMERGENCY DEPARTMENT ([facility])  July 6, 2019    History     Chief Complaint   Patient presents with     Hyperglycemia     HPI  Andrew Lockwood is a 51 year old male with a history of type 2 DM, AIDS, and CMS toxoplasmosis who presents to the Emergency Department for evaluation of hyperglycemia and abdominal pain. Patient reports that he has had lower abdominal pain since approximately 4-5 hours PTA. He reports having a blood sugar level of 501, and admits he has missed 2 doses of his prescribed insulin. Patient notes that he has had similar abdominal pain several times before.    Per chart review patient presented to the ED on 7/3/2019 for evaluation of a prolonged headache following a head injury. He had a head CT which showed no ICH. His glucose was found to be 390 and he admitted he did not take his morning insulin. Labs showed no evidence of DKA. Patient was given IV fluids and insulin the ED with improvement in his glucose. He was discharged with instructions for closed head injury and close return instructions.    PAST MEDICAL HISTORY:   Past Medical History:   Diagnosis Date     AIDS (H)      Allergic rhinitis due to other allergen     DNS     Chronic abdominal pain      CNS toxoplasmosis (H)      Diabetes type 2, controlled (H)      GERD (gastroesophageal reflux disease)      HIV (human immunodeficiency virus infection) (H)      Periungual wart      Sleep apnea     doesn't use CPAP       PAST SURGICAL HISTORY:   Past Surgical History:   Procedure Laterality Date     C NONSPECIFIC PROCEDURE      right forearm fracture     COLONOSCOPY Left 1/22/2016    Procedure: COMBINED COLONOSCOPY, SINGLE OR MULTIPLE BIOPSY/POLYPECTOMY BY BIOPSY;  Surgeon: Clark Saini MD;  [facility]     HC EXPLORE UNDESC TESTIS,INGUIN/SCROTAL       LAPAROSCOPIC APPENDECTOMY N/A 1/31/2018    Procedure: LAPAROSCOPIC APPENDECTOMY;  LAPAROSCOPIC APPENDECTOMY;  Surgeon: Dawn Holt MD;   [facility]     LAPAROSCOPY DIAGNOSTIC (GENERAL) N/A 2016    Procedure: LAPAROSCOPY DIAGNOSTIC (GENERAL);  Surgeon: uSsannah Arriaga MD;  [facility]     LAPAROSCOPY DIAGNOSTIC (GENERAL) N/A 2018    Procedure: LAPAROSCOPY DIAGNOSTIC (GENERAL);  Diagnostic laparoscopy and lysis of adhesions;  Surgeon: Prince Dowling MD;  [facility]     OPTICAL TRACKING SYSTEM CRANIOTOMY, EXCISE TUMOR, COMBINED Left 4/10/2015    Procedure: COMBINED OPTICAL TRACKING SYSTEM CRANIOTOMY, EXCISE TUMOR;  Surgeon: Mirlande Colmenares MD;  [facility]     REPAIR GAMEKEEPER'S THUMB Right 2016    Procedure: REPAIR LIGAMENT ULNAR COLLATERAL THUMB (GAMEKEEPER'S);  Surgeon: Evin Zamorano MD;  [facility]       FAMILY HISTORY:   Family History   Problem Relation Age of Onset     Diabetes Brother      Diabetes Father      Alzheimer Disease Father      Unknown/Adopted Mother      Diabetes Paternal Grandfather      Cancer No family hx of         no skin cancer     Skin Cancer No family hx of         no famiy hx of skin cancer     Glaucoma No family hx of      Macular Degeneration No family hx of        SOCIAL HISTORY:   Social History     Tobacco Use     Smoking status: Current Some Day Smoker     Packs/day: 0.25     Types: Cigarettes     Last attempt to quit: 10/28/2016     Years since quittin.6     Smokeless tobacco: Former User   Substance Use Topics     Alcohol use: No     Alcohol/week: 0.0 oz     Comment: Last etoh in        Discharge Medication List as of 2019  2:56 AM      CONTINUE these medications which have NOT CHANGED    Details   Bictegravir-Emtricitab-Tenofov -25 MG TABS Take 1 tablet by mouth daily, Disp-30 tablet, R-11, E-Prescribe      blood glucose (NO BRAND SPECIFIED) lancets standard Use to test blood sugar four times daily or as directed.Disp-100 each, [id number] provide lancets that are covered by insurance.      blood glucose (NO BRAND SPECIFIED)  test strip 1 strip by In Vitro route 4 times daily (before meals and nightly) Use to test blood sugars 4 times daily or as directed, Disp-100 strip, R-11, E-PrescribePlease provide test strips that are covered by insurance.      blood glucose monitoring (NO BRAND SPECIFIED) meter device kit Use to test blood sugar 4 times daily or as directed.Disp-1 kit, [id number] provide meter that is covered by insurance.      cephALEXin (KEFLEX) 500 MG capsule Take 1 capsule (500 mg) by mouth 4 times daily, Disp-28 capsule, R-0, Local Print      HUMALOG KWIKPEN 100 UNIT/ML soln Inject 5 units with breakfast, lunch and dinner daily., Disp-15 mL, R-3, OLESYA, E-Prescribe      insulin glargine (BASAGLAR KWIKPEN) 100 UNIT/ML pen Inject 80 units subcutaneous each AM ONLY., Disp-15 mL, R-0, E-Prescribe      !! insulin pen needle (B-D U/F) 31G X 8 MM miscellaneous Use 1 pen needles daily or as directed.Disp-100 each, [id number]      !! insulin pen needle (BD PEN NEEDLE SCOTT 2ND GEN) 32G X 4 MM miscellaneous Use 3-4 daily.Disp-250 each, [id number]      NOVOLOG FLEXPEN 100 UNIT/ML soln Inject 5 units with breakfast, lunch and dinner daily., Disp-15 mL, R-3, OLESYA, E-PrescribePlease dispense as Novolog Flexpen 100 unit/mL      omeprazole (PRILOSEC) 20 MG CR capsule Take 1 capsule (20 mg) by mouth 2 times daily, Disp-180 capsule, R-3, E-Prescribe      ranitidine (ZANTAC) 150 MG tablet Take 1 tablet (150 mg) by mouth 2 times daily, Disp-60 tablet, R-11, E-Prescribe       !! - Potential duplicate medications found. Please discuss with provider.             Allergies   Allergen Reactions     Metformin      Abdominal pain     Milk [Lac Bovis] Hives     Tylenol [Acetaminophen] Itching     Dulaglutide Rash         I have reviewed the Medications, Allergies, Past Medical and Surgical History, and Social History in the Epic system.    Review of Systems   Gastrointestinal: Positive for abdominal pain ( lower).   All other systems  reviewed and are negative.      Physical Exam   BP: 141/87  Heart Rate: 107  Temp: 98.7  F (37.1  C)  Resp: 18  SpO2: 97 %      Physical Exam   Constitutional: He is oriented to person, place, and time. No distress.   HENT:   Head: Normocephalic and atraumatic.   Neck: Normal range of motion. Neck supple.   Cardiovascular: Normal rate and intact distal pulses.   Pulmonary/Chest: Effort normal. No stridor. No respiratory distress. He has no wheezes. He has no rales.   Abdominal: Soft. He exhibits no distension. There is no tenderness. There is no rebound and no guarding.   Musculoskeletal: Normal range of motion.   Neurological: He is alert and oriented to person, place, and time. No sensory deficit.   Skin: Skin is warm and dry. He is not diaphoretic.   Psychiatric: He has a normal mood and affect.   Nursing note and vitals reviewed.      ED Course        Procedures       11:40 PM  The patient was seen and examined by Angelo Estes MD in Room 14.          Critical Care time:  none             Labs Ordered and Resulted from Time of ED Arrival Up to the Time of Departure from the ED   GLUCOSE BY METER - Abnormal; Notable for the following components:       Result Value    Glucose 501 (*)     All other components within normal limits            Assessments & Plan (with Medical Decision Making)   Andrew has a history of AIDS and DM and he presents with a headache and elevated blood sugar.  He states he ran out of his Lantus and plans on picking it up at the pharmacy tomorrow. His blood glucose is 522, but there wass no acidosis and his mental status was normal.  He was given his Lantus 40 units, and Novolin 8 units. Follow up if continued hyperglycemia.           I have reviewed the nursing notes.    I have reviewed the findings, diagnosis, plan and need for follow up with the patient.       Medication List      There are no discharge medications for this visit.         Final diagnoses:   None     I, Barbi Beckman,  am serving as a trained medical scribe to document services personally performed by Angelo Estes MD, based on the provider's statements to me.   I, Angelo Estes MD, was physically present and have reviewed and verified the accuracy of this note documented by Barbi Beckman.    7/6/2019   [facility], [place], EMERGENCY DEPARTMENT     Angelo Estes MD  07/08/19 9655

## 2019-07-07 NOTE — ED TRIAGE NOTES
"Presents to ED with headache since 10 AM this morning. Reports blurred vision that pt attributes to his \"sugar being up.\" AOX4, CMS intact. Denies chest pain, SOB. 501 Glucose in triage.  "

## 2019-07-07 NOTE — DISCHARGE INSTRUCTIONS
Please obtain your diabetes medications today from the pharmacy and follow up with your primary care provider.

## 2019-07-08 ENCOUNTER — TELEPHONE (OUTPATIENT)
Dept: INFECTIOUS DISEASES | Facility: CLINIC | Age: 54
End: 2019-07-08

## 2019-07-08 NOTE — TELEPHONE ENCOUNTER
It has come to our attention([facility], [facility]) that he has lost his supportive housing and is homeless again. 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 I remain available to resume appropriate community based resources and coordinate this. Feel free  to refer him to me, [phone]. He usually prefers to walk in our clinic to ask for me and that's ok too.  Simon Cadena, [facility]  [facility]

## 2019-07-11 ENCOUNTER — TELEPHONE (OUTPATIENT)
Dept: INFECTIOUS DISEASES | Facility: CLINIC | Age: 54
End: 2019-07-11

## 2019-07-13 ENCOUNTER — APPOINTMENT (OUTPATIENT)
Dept: CT IMAGING | Facility: CLINIC | Age: 54
End: 2019-07-13
Attending: EMERGENCY MEDICINE
Payer: COMMERCIAL

## 2019-07-13 ENCOUNTER — HOSPITAL ENCOUNTER (EMERGENCY)
Facility: CLINIC | Age: 54
Discharge: HOME | End: 2019-07-13
Attending: EMERGENCY MEDICINE | Admitting: EMERGENCY MEDICINE
Payer: COMMERCIAL

## 2019-07-13 VITALS
HEIGHT: 64 IN | BODY MASS INDEX: 28.06 KG/M2 | DIASTOLIC BLOOD PRESSURE: 74 MMHG | OXYGEN SATURATION: 100 % | SYSTOLIC BLOOD PRESSURE: 111 MMHG | TEMPERATURE: 97.8 F | HEART RATE: 90 BPM | RESPIRATION RATE: 16 BRPM | WEIGHT: 164.38 LBS

## 2019-07-13 DIAGNOSIS — R10.9 FLANK PAIN: ICD-10-CM

## 2019-07-13 LAB
ALBUMIN UR-MCNC: 10 MG/DL
ANION GAP SERPL CALCULATED.3IONS-SCNC: 8 MMOL/L (ref 3–14)
APPEARANCE UR: CLEAR
BASOPHILS # BLD AUTO: 0 10E9/L (ref 0–0.2)
BASOPHILS NFR BLD AUTO: 0.4 %
BILIRUB UR QL STRIP: NEGATIVE
BUN SERPL-MCNC: 22 MG/DL (ref 7–30)
CALCIUM SERPL-MCNC: 9.2 MG/DL (ref 8.5–10.1)
CHLORIDE SERPL-SCNC: 104 MMOL/L (ref 94–109)
CO2 SERPL-SCNC: 23 MMOL/L (ref 20–32)
COLOR UR AUTO: YELLOW
CREAT SERPL-MCNC: 0.76 MG/DL (ref 0.66–1.25)
DIFFERENTIAL METHOD BLD: ABNORMAL
EOSINOPHIL # BLD AUTO: 0.2 10E9/L (ref 0–0.7)
EOSINOPHIL NFR BLD AUTO: 1.4 %
ERYTHROCYTE [DISTWIDTH] IN BLOOD BY AUTOMATED COUNT: 13 % (ref 10–15)
GFR SERPL CREATININE-BSD FRML MDRD: >90 ML/MIN/{1.73_M2}
GLUCOSE SERPL-MCNC: 299 MG/DL (ref 70–99)
GLUCOSE UR STRIP-MCNC: >1000 MG/DL
HCT VFR BLD AUTO: 42.2 % (ref 40–53)
HGB BLD-MCNC: 14.2 G/DL (ref 13.3–17.7)
HGB UR QL STRIP: NEGATIVE
IMM GRANULOCYTES # BLD: 0.1 10E9/L (ref 0–0.4)
IMM GRANULOCYTES NFR BLD: 0.8 %
KETONES UR STRIP-MCNC: NEGATIVE MG/DL
LEUKOCYTE ESTERASE UR QL STRIP: NEGATIVE
LYMPHOCYTES # BLD AUTO: 2.5 10E9/L (ref 0.8–5.3)
LYMPHOCYTES NFR BLD AUTO: 22.1 %
MCH RBC QN AUTO: 30.6 PG (ref 26.5–33)
MCHC RBC AUTO-ENTMCNC: 33.6 G/DL (ref 31.5–36.5)
MCV RBC AUTO: 91 FL (ref 78–100)
MONOCYTES # BLD AUTO: 0.8 10E9/L (ref 0–1.3)
MONOCYTES NFR BLD AUTO: 7.1 %
MUCOUS THREADS #/AREA URNS LPF: PRESENT /LPF
NEUTROPHILS # BLD AUTO: 7.6 10E9/L (ref 1.6–8.3)
NEUTROPHILS NFR BLD AUTO: 68.2 %
NITRATE UR QL: NEGATIVE
NRBC # BLD AUTO: 0 10*3/UL
NRBC BLD AUTO-RTO: 0 /100
PH UR STRIP: 5.5 PH (ref 5–7)
PLATELET # BLD AUTO: 430 10E9/L (ref 150–450)
POTASSIUM SERPL-SCNC: 3.4 MMOL/L (ref 3.4–5.3)
RBC # BLD AUTO: 4.64 10E12/L (ref 4.4–5.9)
RBC #/AREA URNS AUTO: 6 /HPF (ref 0–2)
SODIUM SERPL-SCNC: 135 MMOL/L (ref 133–144)
SOURCE: ABNORMAL
SP GR UR STRIP: 1.03 (ref 1–1.03)
SQUAMOUS #/AREA URNS AUTO: <1 /HPF (ref 0–1)
UROBILINOGEN UR STRIP-MCNC: NORMAL MG/DL (ref 0–2)
WBC # BLD AUTO: 11.1 10E9/L (ref 4–11)
WBC #/AREA URNS AUTO: 1 /HPF (ref 0–5)

## 2019-07-13 PROCEDURE — 99285 EMERGENCY DEPT VISIT HI MDM: CPT | Mod: 25 | Performed by: EMERGENCY MEDICINE

## 2019-07-13 PROCEDURE — 96374 THER/PROPH/DIAG INJ IV PUSH: CPT | Performed by: EMERGENCY MEDICINE

## 2019-07-13 PROCEDURE — 25000128 H RX IP 250 OP 636: Performed by: EMERGENCY MEDICINE

## 2019-07-13 PROCEDURE — 80048 BASIC METABOLIC PNL TOTAL CA: CPT | Performed by: EMERGENCY MEDICINE

## 2019-07-13 PROCEDURE — 99284 EMERGENCY DEPT VISIT MOD MDM: CPT | Mod: Z6 | Performed by: EMERGENCY MEDICINE

## 2019-07-13 PROCEDURE — 85025 COMPLETE CBC W/AUTO DIFF WBC: CPT | Performed by: EMERGENCY MEDICINE

## 2019-07-13 PROCEDURE — 74176 CT ABD & PELVIS W/O CONTRAST: CPT

## 2019-07-13 PROCEDURE — 81001 URINALYSIS AUTO W/SCOPE: CPT | Performed by: EMERGENCY MEDICINE

## 2019-07-13 RX ORDER — KETOROLAC TROMETHAMINE 15 MG/ML
15 INJECTION, SOLUTION INTRAMUSCULAR; INTRAVENOUS ONCE
Status: COMPLETED | OUTPATIENT
Start: 2019-07-13 | End: 2019-07-13

## 2019-07-13 RX ADMIN — KETOROLAC TROMETHAMINE 15 MG: 15 INJECTION, SOLUTION INTRAMUSCULAR; INTRAVENOUS at 11:47

## 2019-07-13 ASSESSMENT — ENCOUNTER SYMPTOMS
FEVER: 0
DIARRHEA: 0
ABDOMINAL PAIN: 1
VOMITING: 0
FLANK PAIN: 1

## 2019-07-13 ASSESSMENT — MIFFLIN-ST. JEOR: SCORE: 1501.6
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
## 2019-07-13 NOTE — ED TRIAGE NOTES
51 year old male with history of chronic abdominal pain and HIV, presents with worsening left sided abdominal pain for one week.  Patient denies any nausea or vomiting.

## 2019-07-13 NOTE — ED AVS SNAPSHOT
[facility], [place], Emergency Department  [address] [place] [address]  Phone:  [phone]                                    Andrew Lockwood   MRN: [id number]    Department:  [facility], Emergency Department   Date of Visit:  7/13/2019           After Visit Summary Signature Page    I have received my discharge instructions, and my questions have been answered. I have discussed any challenges I see with this plan with the nurse or doctor.    ..........................................................................................................................................  Patient/Patient Representative Signature      ..........................................................................................................................................  Patient Representative Print Name and Relationship to Patient    ..................................................               ................................................  Date                                   Time    ..........................................................................................................................................  Reviewed by Signature/Title    ...................................................              ..............................................  Date                                               Time          22EPIC Rev 08/18

## 2019-07-13 NOTE — DISCHARGE INSTRUCTIONS
Keep hydrated.    Strain urine for stones and save any if found for your follow-up appointment.    Please make an appointment to follow up with Your Primary Care Provider in one week for recheck.    Return to the ER for worsening or fever.

## 2019-07-13 NOTE — ED PROVIDER NOTES
[place] EMERGENCY DEPARTMENT ([facility])  7/13/19    History     Chief Complaint   Patient presents with     Abdominal Pain     HPI  Andrew Lockwood is a 51 year old male who presents to the ER with complaints of some  left flank pain.  Patient has a history of chronic abdominal pain and HIV but states that he has not had pain like this before.  Patient states that this pain has been with him intermittently for 1 week.  Patient states that he has had no fevers, no vomiting, or diarrhea but states the pain is persistent and uncontrolled.    This part of the medical record was transcribed by Angelo Anderson Medical Scribe, from a dictation done by Pieter Bruno MD.     I have reviewed the Medications, Allergies, Past Medical and Surgical History, and Social History in the [employer] system.    PAST MEDICAL HISTORY:   Past Medical History:   Diagnosis Date     AIDS (H)      Allergic rhinitis due to other allergen     DNS     Chronic abdominal pain      CNS toxoplasmosis (H)      Diabetes type 2, controlled (H)      GERD (gastroesophageal reflux disease)      HIV (human immunodeficiency virus infection) (H)      Periungual wart      Sleep apnea     doesn't use CPAP       PAST SURGICAL HISTORY:   Past Surgical History:   Procedure Laterality Date     C NONSPECIFIC PROCEDURE      right forearm fracture     COLONOSCOPY Left 1/22/2016    Procedure: COMBINED COLONOSCOPY, SINGLE OR MULTIPLE BIOPSY/POLYPECTOMY BY BIOPSY;  Surgeon: Clark Saini MD;  [facility]     HC EXPLORE UNDESC TESTIS,INGUIN/SCROTAL       LAPAROSCOPIC APPENDECTOMY N/A 1/31/2018    Procedure: LAPAROSCOPIC APPENDECTOMY;  LAPAROSCOPIC APPENDECTOMY;  Surgeon: Dawn Holt MD;  [facility]     LAPAROSCOPY DIAGNOSTIC (GENERAL) N/A 7/26/2016    Procedure: LAPAROSCOPY DIAGNOSTIC (GENERAL);  Surgeon: Susannah Arriaga MD;  [facility]     LAPAROSCOPY DIAGNOSTIC (GENERAL) N/A 4/16/2018    Procedure: LAPAROSCOPY DIAGNOSTIC  (GENERAL);  Diagnostic laparoscopy and lysis of adhesions;  Surgeon: Prince Dowling MD;  [facility]     OPTICAL TRACKING SYSTEM CRANIOTOMY, EXCISE TUMOR, COMBINED Left 4/10/2015    Procedure: COMBINED OPTICAL TRACKING SYSTEM CRANIOTOMY, EXCISE TUMOR;  Surgeon: Mirlande Colmenares MD;  [facility]     REPAIR GAMEKEEPER'S THUMB Right 2016    Procedure: REPAIR LIGAMENT ULNAR COLLATERAL THUMB (GAMEKEEPER'S);  Surgeon: Evin Zamorano MD;  [facility]       FAMILY HISTORY:   Family History   Problem Relation Age of Onset     Diabetes Brother      Diabetes Father      Alzheimer Disease Father      Unknown/Adopted Mother      Diabetes Paternal Grandfather      Cancer No family hx of         no skin cancer     Skin Cancer No family hx of         no famiy hx of skin cancer     Glaucoma No family hx of      Macular Degeneration No family hx of        SOCIAL HISTORY:   Social History     Tobacco Use     Smoking status: Current Some Day Smoker     Packs/day: 0.25     Types: Cigarettes     Last attempt to quit: 10/28/2016     Years since quittin.7     Smokeless tobacco: Former User   Substance Use Topics     Alcohol use: No     Alcohol/week: 0.0 oz     Comment: Last etoh in         Allergies   Allergen Reactions     Metformin      Abdominal pain     Milk [Lac Bovis] Hives     Tylenol [Acetaminophen] Itching     Dulaglutide Rash       Dose / Directions   bictegravir-emtricitabine-tenofovir -25 MG per tablet  Commonly known as:  BIKTARVY  Used for:  HIV (human immunodeficiency virus infection) (H)      Dose:  1 tablet  Take 1 tablet by mouth daily  Quantity:  30 tablet  Refills:  11     cephALEXin 500 MG capsule  Commonly known as:  KEFLEX      Dose:  500 mg  Take 1 capsule (500 mg) by mouth 4 times daily  Quantity:  28 capsule  Refills:  0     HumaLOG KWIKpen 100 UNIT/ML pen  Used for:  Type 2 diabetes mellitus (H)  Generic drug:  insulin lispro      Inject 5 units with breakfast, lunch  "and dinner daily.  Quantity:  15 mL  Refills:  3     insulin glargine 100 UNIT/ML pen  Used for:  Type 2 diabetes mellitus with hyperglycemia, without long-term current use of insulin (H)      Inject 80 units subcutaneous each AM ONLY.  Quantity:  15 mL  Refills:  0     NovoLOG FLEXPEN 100 UNIT/ML pen  Used for:  Type 2 diabetes mellitus with hyperglycemia, without long-term current use of insulin (H)  Generic drug:  insulin aspart      Inject 5 units with breakfast, lunch and dinner daily.  Quantity:  15 mL  Refills:  3     omeprazole 20 MG DR capsule  Commonly known as:  priLOSEC  Used for:  Heart burn      Dose:  20 mg  Take 1 capsule (20 mg) by mouth 2 times daily  Quantity:  180 capsule  Refills:  3     ranitidine 150 MG tablet  Commonly known as:  ZANTAC  Used for:  Heart burn      Dose:  150 mg  Take 1 tablet (150 mg) by mouth 2 times daily  Quantity:  60 tablet  Refills:  11              Review of Systems   Constitutional: Negative for fever.   Gastrointestinal: Positive for abdominal pain. Negative for diarrhea and vomiting.   Genitourinary: Positive for flank pain (Left sided).   All other systems reviewed and are negative.      Physical Exam   BP: 135/68  Pulse: 121  Temp: 97.8  F (36.6  C)  Resp: 22  Height: 162.6 cm (5' 4\")  Weight: 74.6 kg (164 lb 6 oz)  SpO2: 97 %      Physical Exam   Constitutional: He is oriented to person, place, and time.   Alert and conversant   HENT:   Head: Atraumatic.   Eyes: Pupils are equal, round, and reactive to light. EOM are normal.   Neck: Neck supple.   Cardiovascular: Regular rhythm.   Pulmonary/Chest: Breath sounds normal.   Abdominal: Soft. There is no tenderness.   Questionable left CVA tenderness   Musculoskeletal: He exhibits no edema or deformity.   Neurological: He is alert and oriented to person, place, and time.   Grossly intact and symmetric   Skin: Skin is warm.   Psychiatric:   Dooms dramatic   Nursing note and vitals reviewed.      ED Course      "   Procedures            Labs Ordered and Resulted from Time of ED Arrival Up to the Time of Departure from the ED   CBC WITH PLATELETS DIFFERENTIAL - Abnormal; Notable for the following components:       Result Value    WBC 11.1 (*)     All other components within normal limits   BASIC METABOLIC PANEL - Abnormal; Notable for the following components:    Glucose 299 (*)     All other components within normal limits   ROUTINE UA WITH MICROSCOPIC REFLEX TO CULTURE - Abnormal; Notable for the following components:    Glucose Urine >1000 (*)     Protein Albumin Urine 10 (*)     RBC Urine 6 (*)     Mucous Urine Present (*)     All other components within normal limits   PULSE OXIMETRY NURSING   STRAIN URINE   PERIPHERAL IV CATHETER     Results for orders placed or performed during the hospital encounter of 07/13/19 (from the past 24 hour(s))   CBC with platelets differential   Result Value Ref Range    WBC 11.1 (H) 4.0 - 11.0 10e9/L    RBC Count 4.64 4.4 - 5.9 10e12/L    Hemoglobin 14.2 13.3 - 17.7 g/dL    Hematocrit 42.2 40.0 - 53.0 %    MCV 91 78 - 100 fl    MCH 30.6 26.5 - 33.0 pg    MCHC 33.6 31.5 - 36.5 g/dL    RDW 13.0 10.0 - 15.0 %    Platelet Count 430 150 - 450 10e9/L    Diff Method Automated Method     % Neutrophils 68.2 %    % Lymphocytes 22.1 %    % Monocytes 7.1 %    % Eosinophils 1.4 %    % Basophils 0.4 %    % Immature Granulocytes 0.8 %    Nucleated RBCs 0 0 /100    Absolute Neutrophil 7.6 1.6 - 8.3 10e9/L    Absolute Lymphocytes 2.5 0.8 - 5.3 10e9/L    Absolute Monocytes 0.8 0.0 - 1.3 10e9/L    Absolute Eosinophils 0.2 0.0 - 0.7 10e9/L    Absolute Basophils 0.0 0.0 - 0.2 10e9/L    Abs Immature Granulocytes 0.1 0 - 0.4 10e9/L    Absolute Nucleated RBC 0.0    Basic metabolic panel   Result Value Ref Range    Sodium 135 133 - 144 mmol/L    Potassium 3.4 3.4 - 5.3 mmol/L    Chloride 104 94 - 109 mmol/L    Carbon Dioxide 23 20 - 32 mmol/L    Anion Gap 8 3 - 14 mmol/L    Glucose 299 (H) 70 - 99 mg/dL    Urea  "Nitrogen 22 7 - 30 mg/dL    Creatinine 0.76 0.66 - 1.25 mg/dL    GFR Estimate >90 >60 mL/min/[1.73_m2]    GFR Estimate If Black >90 >60 mL/min/[1.73_m2]    Calcium 9.2 8.5 - 10.1 mg/dL   UA with Microscopic reflex to Culture   Result Value Ref Range    Color Urine Yellow     Appearance Urine Clear     Glucose Urine >1000 (A) NEG^Negative mg/dL    Bilirubin Urine Negative NEG^Negative    Ketones Urine Negative NEG^Negative mg/dL    Specific Gravity Urine 1.031 1.003 - 1.035    Blood Urine Negative NEG^Negative    pH Urine 5.5 5.0 - 7.0 pH    Protein Albumin Urine 10 (A) NEG^Negative mg/dL    Urobilinogen mg/dL Normal 0.0 - 2.0 mg/dL    Nitrite Urine Negative NEG^Negative    Leukocyte Esterase Urine Negative NEG^Negative    Source Midstream Urine     WBC Urine 1 0 - 5 /HPF    RBC Urine 6 (H) 0 - 2 /HPF    Squamous Epithelial /HPF Urine <1 0 - 1 /HPF    Mucous Urine Present (A) NEG^Negative /LPF   Abd/pelvis CT no contrast - Stone Protocol    Narrative    PRELIMINARY REPORT - The following report is a preliminary  interpretation.      Impression    IMPRESSION: No evidence of nephrolithiasis, hydronephrosis, or  hydroureter.      *Note: Due to a large number of results and/or encounters for the requested time period, some results have not been displayed. A complete set of results can be found in Results Review.            Assessments & Plan (with Medical Decision Making)     I have reviewed the nursing notes.    Medications   0.9% sodium chloride BOLUS (has no administration in time range)   ketorolac (TORADOL) injection 15 mg (has no administration in time range)       On reexam after CT the patient was resting comfortably.    /68   Pulse 90   Temp 97.8  F (36.6  C) (Oral)   Resp 22   Ht 1.626 m (5' 4\")   Wt 74.6 kg (164 lb 6 oz)   SpO2 98%   BMI 28.21 kg/m       Patient may have passed a kidney stone prior to CT or his overly dramatic presentation may be part of his chronic pain syndrome.  At this time " the patient will be sent home.    I have reviewed the findings, diagnosis, plan and need for follow up with the patient.       Medication List      There are no discharge medications for this visit.         Final diagnoses:   Flank pain     Keep hydrated.    Strain urine for stones and save any if found for your follow-up appointment.    Please make an appointment to follow up with Your Primary Care Provider in one week for recheck.    Return to the ER for worsening or fever.    MD ROBIN Mazariegos Ryan Green, am serving as a trained medical scribe to document services personally performed by Pieter Bruno MD, based on the provider's statements to me.   Pieter KELLY MD, was physically present and have reviewed and verified the accuracy of this note documented by Angelo Anderson.    7/13/2019   [facility], [place], EMERGENCY DEPARTMENT     Pieter Bruno MD  07/13/19 1590

## 2019-07-17 ENCOUNTER — HOSPITAL ENCOUNTER (EMERGENCY)
Facility: CLINIC | Age: 54
Discharge: HOME | End: 2019-07-17
Attending: EMERGENCY MEDICINE | Admitting: EMERGENCY MEDICINE
Payer: COMMERCIAL

## 2019-07-17 ENCOUNTER — APPOINTMENT (OUTPATIENT)
Dept: GENERAL RADIOLOGY | Facility: CLINIC | Age: 54
End: 2019-07-17
Attending: EMERGENCY MEDICINE
Payer: COMMERCIAL

## 2019-07-17 VITALS
RESPIRATION RATE: 16 BRPM | SYSTOLIC BLOOD PRESSURE: 120 MMHG | WEIGHT: 166.9 LBS | OXYGEN SATURATION: 96 % | TEMPERATURE: 97.9 F | HEART RATE: 113 BPM | BODY MASS INDEX: 28.49 KG/M2 | HEIGHT: 64 IN | DIASTOLIC BLOOD PRESSURE: 80 MMHG

## 2019-07-17 DIAGNOSIS — S20.311A: ICD-10-CM
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
## 2019-07-17 DIAGNOSIS — T14.8XXA ABRASION: ICD-10-CM

## 2019-07-17 DIAGNOSIS — Z79.4 ENCOUNTER FOR LONG-TERM (CURRENT) USE OF INSULIN (H): ICD-10-CM

## 2019-07-17 DIAGNOSIS — R73.9 HYPERGLYCEMIA: ICD-10-CM

## 2019-07-17 DIAGNOSIS — S63.602A SPRAIN OF LEFT THUMB, UNSPECIFIED SITE OF FINGER, INITIAL ENCOUNTER: ICD-10-CM

## 2019-07-17 LAB
GLUCOSE BLDC GLUCOMTR-MCNC: 316 MG/DL (ref 70–99)
GLUCOSE BLDC GLUCOMTR-MCNC: 337 MG/DL (ref 70–99)
GLUCOSE BLDC GLUCOMTR-MCNC: 395 MG/DL (ref 70–99)

## 2019-07-17 PROCEDURE — 71046 X-RAY EXAM CHEST 2 VIEWS: CPT

## 2019-07-17 PROCEDURE — 73130 X-RAY EXAM OF HAND: CPT | Mod: LT

## 2019-07-17 PROCEDURE — 99284 EMERGENCY DEPT VISIT MOD MDM: CPT | Mod: 25

## 2019-07-17 PROCEDURE — 29125 APPL SHORT ARM SPLINT STATIC: CPT | Mod: LT

## 2019-07-17 PROCEDURE — 00000146 ZZHCL STATISTIC GLUCOSE BY METER IP

## 2019-07-17 PROCEDURE — 99284 EMERGENCY DEPT VISIT MOD MDM: CPT | Mod: Z6 | Performed by: EMERGENCY MEDICINE

## 2019-07-17 PROCEDURE — 25000131 ZZH RX MED GY IP 250 OP 636 PS 637: Performed by: EMERGENCY MEDICINE

## 2019-07-17 PROCEDURE — 96372 THER/PROPH/DIAG INJ SC/IM: CPT

## 2019-07-17 RX ADMIN — INSULIN ASPART 10 UNITS: 100 INJECTION, SOLUTION INTRAVENOUS; SUBCUTANEOUS at 06:36

## 2019-07-17 ASSESSMENT — ENCOUNTER SYMPTOMS
SHORTNESS OF BREATH: 0
ABDOMINAL PAIN: 0
ROS SKIN COMMENTS: ABRASIONS
FEVER: 0

## 2019-07-17 ASSESSMENT — MIFFLIN-ST. JEOR: SCORE: 1513.05

## 2019-07-17 NOTE — ED AVS SNAPSHOT
[facility], [place], Emergency Department  [address] [place] [id number]  Phone:  [phone]                                    Andrew Lockwood   MRN: [id number]    Department:  [facility], Emergency Department   Date of Visit:  7/17/2019           After Visit Summary Signature Page    I have received my discharge instructions, and my questions have been answered. I have discussed any challenges I see with this plan with the nurse or doctor.    ..........................................................................................................................................  Patient/Patient Representative Signature      ..........................................................................................................................................  Patient Representative Print Name and Relationship to Patient    ..................................................               ................................................  Date                                   Time    ..........................................................................................................................................  Reviewed by Signature/Title    ...................................................              ..............................................  Date                                               Time          22EPIC Rev 08/18
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
## 2019-07-17 NOTE — DISCHARGE INSTRUCTIONS
Monitor your blood sugars and treat as directed.  Use splint as needed.  Use ice and over-the-counter medications as needed for pain.  Follow-up with your clinic doctor within the next few days.  Return with concerns.

## 2019-07-19 ENCOUNTER — OFFICE VISIT (OUTPATIENT)
Dept: ENDOCRINOLOGY | Facility: CLINIC | Age: 54
End: 2019-07-19
Payer: COMMERCIAL

## 2019-07-19 ENCOUNTER — DOCUMENTATION ONLY (OUTPATIENT)
Dept: SURGERY | Facility: CLINIC | Age: 54
End: 2019-07-19

## 2019-07-19 ENCOUNTER — OFFICE VISIT (OUTPATIENT)
Dept: INFECTIOUS DISEASES | Facility: CLINIC | Age: 54
End: 2019-07-19
Attending: INTERNAL MEDICINE
Payer: COMMERCIAL

## 2019-07-19 VITALS
BODY MASS INDEX: 29.06 KG/M2 | WEIGHT: 170.2 LBS | SYSTOLIC BLOOD PRESSURE: 134 MMHG | HEIGHT: 64 IN | HEART RATE: 77 BPM | DIASTOLIC BLOOD PRESSURE: 80 MMHG

## 2019-07-19 VITALS
WEIGHT: 170 LBS | BODY MASS INDEX: 29.02 KG/M2 | HEART RATE: 77 BPM | SYSTOLIC BLOOD PRESSURE: 134 MMHG | HEIGHT: 64 IN | DIASTOLIC BLOOD PRESSURE: 80 MMHG

## 2019-07-19 DIAGNOSIS — Z21 HIV INFECTION, UNSPECIFIED SYMPTOM STATUS (H): Primary | ICD-10-CM

## 2019-07-19 DIAGNOSIS — Z11.3 ROUTINE SCREENING FOR STI (SEXUALLY TRANSMITTED INFECTION): ICD-10-CM

## 2019-07-19 DIAGNOSIS — E11.65 TYPE 2 DIABETES MELLITUS WITH HYPERGLYCEMIA, WITHOUT LONG-TERM CURRENT USE OF INSULIN (H): ICD-10-CM

## 2019-07-19 PROCEDURE — G0463 HOSPITAL OUTPT CLINIC VISIT: HCPCS | Mod: ZF

## 2019-07-19 RX ORDER — INSULIN GLARGINE 100 [IU]/ML
INJECTION, SOLUTION SUBCUTANEOUS
Qty: 15 ML | Refills: 3 | Status: ON HOLD | COMMUNITY
Start: 2019-07-19 | End: 2019-11-12

## 2019-07-19 ASSESSMENT — PAIN SCALES - GENERAL
PAINLEVEL: NO PAIN (0)
PAINLEVEL: NO PAIN (0)

## 2019-07-19 ASSESSMENT — MIFFLIN-ST. JEOR
SCORE: 1528.02
SCORE: 1527.11

## 2019-07-19 NOTE — PROGRESS NOTES
INR 4.64 on 7/18/19    INR was 2.61 6/25/19. Had been stable. She normally takes 2.5mg on Mon & Thurs. Med changes: stopped the amiodarone on 7/10. Was started on amlodipine     Pt saw [facility] on 7/17/19 because she missed a step and hurt her right knee. Pt admitted to  from [facility] this evening for partial SBO. Pt AO. Able to transfer IND. VSS on RA. C/o abd pain and requesting food. Denies nausea. Pt NPO, tolerating ice chips. Pt aware of need for stool sample, reports last BM yesterday evening. Placed on enteric precautions. Pt oriented to room and POC. Small wound to RUE cleaned and new drsg applied per pt request. Home meds sent to security. Admission profile completed.

## 2019-07-19 NOTE — PROGRESS NOTES
Discussed with DAYNA Arguello in the office that I've been trying to get patient scheduled for a procedure, but that patient has barriers preventing this, primarily that patient is homeless, so needs somewhere to stay, someone to bring him there, and someone to monitor him for 24 hours afterwards. DAYNA Love is familiar with patient, and states that patient's SW is Simon Cadena MSW, and that he would most likely be able to assist with this dilemma.     DAYNA Montes responded to my in basket message that patient is now planning to stay at a nearby motel with a friend so that he can be monitored for 24 hours after his surgery. I responded to DAYNA Montes's message with some dates that may work for surgery at the [facility] with Dr Lundberg. DAYNA Montes will discuss with patient and get back to me with the date and plan. I will follow-up accordingly.

## 2019-07-19 NOTE — LETTER
7/19/2019       RE: Andrew Lockwood  [address]  [facility] [address]     Dear Colleague,    Thank you for referring your patient, Andrew Lockwood, to the [facility] ENDOCRINOLOGY at [facility]. Please see a copy of my visit note below.    HPI  Andrew Lockwood is a 51 year old male with type 2 diabetes mellitus here today for a follow up visit.  Pt states he was diagnosed with type 2 diabetes mellitus in 2014.  He tells me that he has had laser treatment in his left eye in the past.  He was seen here by ophthalmology and was diagnosed having an inferior and nasal peripheral horseshoe tear.    No known nephropathy or neuropathy.  His medical history is also significant for HIV/AIDS and CNS toxoplasmosis.  Pt is smoking less.  Andrew tells me he remains homeless.  Pt did not tolerate Trulicity- rash per pt, Metformin- GI distress, Januvia and Jardiance caused abdominal pain per patient.  For his diabetes, he is currently taking Basaglar 40 units subcutaneous each am and 40 units subcutaneous each pm. He take Novolog 5 units before each meal.  Pt's A1C was 10.2 % last visit.  Pt has 2 blood sugar readings and his FBS was 101 on 7/12 and evening blood sugar of 360 on 7/7/2019.  He denies missing his Basaglar doses.  On ROS today, he is smoking less.  Mild cough.  He denies any shortness of breath at rest.  Pt reports blurred vision.  Andrew eats food at the shelters.  Patient denies frequent headaches, nausea, vomiting, chest pain, abdominal pain or diarrhea.  He denies dysuria, hematuria, numbness tingling or pain in his feet toes hands or fingers.  He has ED.    Diabetes Care  Retinopathy: no DR. He was seen by Oph here in 3/2018 with inferior and nasal horseshoe tears left eye.  Nephropathy:none; urine microalbuminuria was negative in May 2017. Urine microalbuminuria + in 11/2018.  Neuropathy: none.  Foot Exam: no ulcers.  Taking aspirin:no.  Lipids:  in 8/2018.      ROS  Please  see under history of present illness.    Allergies  Allergies   Allergen Reactions     Metformin      Abdominal pain     Milk [Lac Bovis] Hives     Tylenol [Acetaminophen] Itching     Dulaglutide Rash       Medications  Current Outpatient Medications   Medication Sig Dispense Refill     Bictegravir-Emtricitab-Tenofov -25 MG TABS Take 1 tablet by mouth daily 30 tablet 11     blood glucose (NO BRAND SPECIFIED) lancets standard Use to test blood sugar four times daily or as directed. 100 each 11     blood glucose (NO BRAND SPECIFIED) test strip 1 strip by In Vitro route 4 times daily (before meals and nightly) Use to test blood sugars 4 times daily or as directed 100 strip 11     blood glucose monitoring (NO BRAND SPECIFIED) meter device kit Use to test blood sugar 4 times daily or as directed. 1 kit 0     cephALEXin (KEFLEX) 500 MG capsule Take 1 capsule (500 mg) by mouth 4 times daily 28 capsule 0     HUMALOG KWIKPEN 100 UNIT/ML soln Inject 5 units with breakfast, lunch and dinner daily. 15 mL 3     insulin glargine (BASAGLAR KWIKPEN) 100 UNIT/ML pen Inject 80 units subcutaneous each AM ONLY. 15 mL 0     insulin pen needle (B-D U/F) 31G X 8 MM miscellaneous Use 1 pen needles daily or as directed. 100 each 3     insulin pen needle (BD PEN NEEDLE SCOTT 2ND GEN) 32G X 4 MM miscellaneous Use 3-4 daily. 250 each 3     NOVOLOG FLEXPEN 100 UNIT/ML soln Inject 5 units with breakfast, lunch and dinner daily. (Patient not taking: Reported on 6/14/2019) 15 mL 3     omeprazole (PRILOSEC) 20 MG CR capsule Take 1 capsule (20 mg) by mouth 2 times daily 180 capsule 3     ranitidine (ZANTAC) 150 MG tablet Take 1 tablet (150 mg) by mouth 2 times daily 60 tablet 11       Family History  family history includes Alzheimer Disease in his father; Diabetes in his brother, father, and paternal grandfather; Unknown/Adopted in his mother.    Social History   reports that he has been smoking cigarettes.  He has been smoking about 0.25  "packs per day. He has quit using smokeless tobacco. He reports that he does not drink alcohol or use drugs.   Pt is homeless at this time.    Past Medical History  Past Medical History:   Diagnosis Date     AIDS (H)      Allergic rhinitis due to other allergen     DNS     Chronic abdominal pain      CNS toxoplasmosis (H)      Diabetes type 2, controlled (H)      GERD (gastroesophageal reflux disease)      HIV (human immunodeficiency virus infection) (H)      Periungual wart      Sleep apnea     doesn't use CPAP       Past Surgical History:   Procedure Laterality Date     C NONSPECIFIC PROCEDURE      right forearm fracture     COLONOSCOPY Left 1/22/2016    Procedure: COMBINED COLONOSCOPY, SINGLE OR MULTIPLE BIOPSY/POLYPECTOMY BY BIOPSY;  Surgeon: Clark Saini MD;  Location: [facility]     HC EXPLORE UNDESC TESTIS,INGUIN/SCROTAL       LAPAROSCOPIC APPENDECTOMY N/A 1/31/2018    Procedure: LAPAROSCOPIC APPENDECTOMY;  LAPAROSCOPIC APPENDECTOMY;  Surgeon: Dawn Holt MD;  Location: [facility]     LAPAROSCOPY DIAGNOSTIC (GENERAL) N/A 7/26/2016    Procedure: LAPAROSCOPY DIAGNOSTIC (GENERAL);  Surgeon: Susannah Arriaga MD;  [facility]     LAPAROSCOPY DIAGNOSTIC (GENERAL) N/A 4/16/2018    Procedure: LAPAROSCOPY DIAGNOSTIC (GENERAL);  Diagnostic laparoscopy and lysis of adhesions;  Surgeon: Prince Dowling MD;  Location: [facility] OR     OPTICAL TRACKING SYSTEM CRANIOTOMY, EXCISE TUMOR, COMBINED Left 4/10/2015    Procedure: COMBINED OPTICAL TRACKING SYSTEM CRANIOTOMY, EXCISE TUMOR;  Surgeon: Mirlande Colmenares MD;  [facility]     REPAIR GAMEKEEPER'S THUMB Right 12/2/2016    Procedure: REPAIR LIGAMENT ULNAR COLLATERAL THUMB (GAMEKEEPER'S);  Surgeon: Evin Zamorano MD;  [facility]       Physical Exam  /80   Pulse 77   Ht 1.626 m (5' 4\")   Wt 77.2 kg (170 lb 3.2 oz)   BMI 29.21 kg/m     Body mass index is 29.21 kg/m .    GENERAL : In no apparent distress  FEET:  No ulcers; " normal monofilamentous exam.    RESULTS  Creatinine   Date Value Ref Range Status   07/13/2019 0.76 0.66 - 1.25 mg/dL Final     GFR Estimate   Date Value Ref Range Status   07/13/2019 >90 >60 mL/min/[1.73_m2] Final     Comment:     Non  GFR Calc  Starting 12/18/2018, serum creatinine based estimated GFR (eGFR) will be   calculated using the Chronic Kidney Disease Epidemiology Collaboration   (CKD-EPI) equation.       Hemoglobin A1C   Date Value Ref Range Status   06/07/2019 10.2 (H) 0 - 5.6 % Final     Comment:     Normal <5.7% Prediabetes 5.7-6.4%  Diabetes 6.5% or higher - adopted from ADA   consensus guidelines.       Potassium   Date Value Ref Range Status   07/13/2019 3.4 3.4 - 5.3 mmol/L Final     ALT   Date Value Ref Range Status   07/07/2019 35 0 - 70 U/L Final     AST   Date Value Ref Range Status   07/07/2019 22 0 - 45 U/L Final     TSH   Date Value Ref Range Status   02/15/2018 3.57 0.40 - 4.00 mU/L Final     T4 Free   Date Value Ref Range Status   04/14/2015 1.02 0.76 - 1.46 ng/dL Final       Cholesterol   Date Value Ref Range Status   08/15/2018 180 <200 mg/dL Final   08/11/2017 194 <200 mg/dL Final     HDL Cholesterol   Date Value Ref Range Status   08/15/2018 38 (L) >39 mg/dL Final   08/11/2017 37 (L) >39 mg/dL Final     LDL Cholesterol Calculated   Date Value Ref Range Status   08/15/2018 105 (H) <100 mg/dL Final     Comment:     Above desirable:  100-129 mg/dl  Borderline High:  130-159 mg/dL  High:             160-189 mg/dL  Very high:       >189 mg/dl     08/11/2017 88 <100 mg/dL Final     Comment:     Desirable:       <100 mg/dl     Triglycerides   Date Value Ref Range Status   08/15/2018 190 (H) <150 mg/dL Final     Comment:     Borderline high:  150-199 mg/dl  High:             200-499 mg/dl  Very high:       >499 mg/dl     08/11/2017 345 (H) <150 mg/dL Final     Comment:     Borderline high:  150-199 mg/dl   High:             200-499 mg/dl   Very high:       >499 mg/dl        Cholesterol/HDL Ratio   Date Value Ref Range Status   05/07/2015 5.0 0.0 - 5.0 Final   04/18/2005 6.3 (H) 0.0 - 5.0 Final     Lab Results   Component Value Date    A1C 6.0 08/11/2017    A1C 6.2 05/23/2017    A1C 6.4 01/23/2017    A1C 7.0 08/18/2016    A1C 8.3 06/16/2016     A1C  10.2 % last visit.    ASSESSMENT/PLAN:    1. TYPE 2 DIABETES MELLITUS:  Uncontrolled type 2 diabetes mellitus.  Pt instructed to take Basaglar 40 units subcutaneous each am and each pm and continue Novolog 5 units with meals.  He has been seen by our CDE here.  His unused insulin will be stored in the refrigerator at the pharmacy per patient.  He is to check his fasting blood sugar each am and blood sugar before lunch and dinner DAILY.  He was instructed to see me in clinic with his glucose meter in 2 months.  He is normotensive today.  Will refer to Oph for annual eye exam.  Will also refer to Urology for ED per pt's request.    2. HIV/AIDS: Pt is followed here by ID staff.    3.  Return to Endocrine Clinic to see me in 2 months.          Again, thank you for allowing me to participate in the care of your patient.      Sincerely,    Ashley Connelly PA-C

## 2019-07-19 NOTE — LETTER
7/19/2019       RE: Andrew Lockwood  [address] [facility] [address]     Dear Colleague,    Thank you for referring your patient, Andrew Lockwood, to the [facility] AND INFECTIOUS DISEASES at [facility]. Please see a copy of my visit note below.      Infectious Disease Clinic Note    Reason for visit:  Follow-up HIV infection     History of the Present Illness:  Mr. Andrew Lockwood is a 53 year old man with HIV previously followed by Dr. Rosado since his diagnosis in 2015.  She left the practice; he was seen 8/15/2018 by Dr. Carbajal, and then last 4/15/19 by me.      Since our last visit, Andrew did lose his housing.  He explains that this was due to having too many visitors and overnight guests.  He has a difficult time saying no to his other homeless friends.  He is mainly staying at a shelter for men in [facility].  He finds it very difficult to follow a diabetic diet, as the food at shelters is very high carb.  Andrew jokes that in the summer the food is always pasta, and in the larsen always chili.    He reports easy tolerance to Biktarvy. When there is psychosocial chaos in his life, he does have lapses where he is not able to take the medication daily. For instance, he was recently in [place]. When he is able to take it regularly, Andrew is very happy with the Biktarvy and reports no adverse effects. He was switched in 7/2018 to this regimen because he wanted something that did not require food.     Otherwise, Andrew reports a new boyfriend that is 26 years old. This young man is also homeless.  He does not do drugs, and for this Andrew is grateful.  His last partner was an injection drug user with heroin, and had Hepatitis C infection.  Andrew would like to avoid this in a partner in the future.  Andrew does engage in both anal receptive and penile insertive rectal intercourse.  He  reports using condoms.  He is looking for help with erectile dysfunction to better enjoy his sex life.    Review of Systems:  12 point review as per HPI and otherwise negative    Past Medical History:   Diagnosis Date     AIDS (H)      Allergic rhinitis due to other allergen     DNS     Chronic abdominal pain      CNS toxoplasmosis (H)      Diabetes type 2, controlled (H)      GERD (gastroesophageal reflux disease)      HIV (human immunodeficiency virus infection) (H)      Periungual wart      Sleep apnea     doesn't use CPAP     HIV History  Date of Diagnosis: 4/2015  Approximated time of transmission: 2008  CD4 Josue: 25  Viral Load at Diagnosis: 171,654  Opportunistic Infections: Toxoplasmosis  CMV Status: IgG+ (4/12/15)  Toxo Status: + (4/12/15), Had CNS toxoplasmosis as his primary illness at the time of diagnosis  HLA  Status: 4/25/15 negative  Tuberculosis Screening: Exposure to a friend with TB, and treated for active TB several years ago. They did not live together.  Historical use of ARVs: Triumeq, Genvoya  Historical Resistance Mutations: Susceptible    Past Surgical History:   Procedure Laterality Date     C NONSPECIFIC PROCEDURE      right forearm fracture     COLONOSCOPY Left 1/22/2016    Procedure: COMBINED COLONOSCOPY, SINGLE OR MULTIPLE BIOPSY/POLYPECTOMY BY BIOPSY;  Surgeon: Clark Saini MD;  Location: [facility]     HC EXPLORE UNDESC TESTIS,INGUIN/SCROTAL       LAPAROSCOPIC APPENDECTOMY N/A 1/31/2018    Procedure: LAPAROSCOPIC APPENDECTOMY;  LAPAROSCOPIC APPENDECTOMY;  Surgeon: Dawn Holt MD;  [facility]     LAPAROSCOPY DIAGNOSTIC (GENERAL) N/A 7/26/2016    Procedure: LAPAROSCOPY DIAGNOSTIC (GENERAL);  Surgeon: Susannah Arriaga MD;  [facility]     LAPAROSCOPY DIAGNOSTIC (GENERAL) N/A 4/16/2018    Procedure: LAPAROSCOPY DIAGNOSTIC (GENERAL);  Diagnostic laparoscopy and lysis of adhesions;  Surgeon: Prince Dowling MD;  [facility]     OPTICAL  TRACKING SYSTEM CRANIOTOMY, EXCISE TUMOR, COMBINED Left 4/10/2015    Procedure: COMBINED OPTICAL TRACKING SYSTEM CRANIOTOMY, EXCISE TUMOR;  Surgeon: Mirlande Colmenares MD;  [facility]     REPAIR GAMEKEEPER'S THUMB Right 2016    Procedure: REPAIR LIGAMENT ULNAR COLLATERAL THUMB (GAMEKEEPER'S);  Surgeon: Evin Zamorano MD;  Location:  OR       Family History   Problem Relation Age of Onset     Diabetes Brother      Diabetes Father      Alzheimer Disease Father      Unknown/Adopted Mother      Diabetes Paternal Grandfather      Cancer No family hx of         no skin cancer     Skin Cancer No family hx of         no famiy hx of skin cancer     Glaucoma No family hx of      Macular Degeneration No family hx of        Social History     Social History Narrative    Born in [facility]  [place]ame to the [place] in .  Last traveled to visit family in .        2019 - Homeless. Working with a  at [employer] trying to get housing. Sexually active with two partners recently using condoms 100% of the time. Smokes occasionally, not for the last 4 weeks.     Social History     Tobacco Use     Smoking status: Current Some Day Smoker     Packs/day: 0.25     Types: Cigarettes     Last attempt to quit: 10/28/2016     Years since quittin.7     Smokeless tobacco: Former User   Substance Use Topics     Alcohol use: No     Alcohol/week: 0.0 oz     Comment: Last etoh in      Drug use: No       Immunization History   Administered Date(s) Administered     HepB 03/10/2016, 2016     HepB-Adult 2017     Influenza (IIV3) PF 10/17/2016     Influenza Vaccine IM 3yrs+ 4 Valent IIV4 2015, 2017     Mantoux Tuberculin Skin Test 2015     Meningococcal (Menactra ) 2015, 03/10/2016     Pneumo Conj 13-V (2010&after) 03/10/2016     Pneumococcal 23 valent 2016     TDAP Vaccine (Boostrix) 10/17/2016         Current Outpatient Medications   Medication Sig      bictegravir-emtricitabine-tenofovir (BIKTARVY) -25 MG per tablet Take 1 tablet by mouth daily     blood glucose (NO BRAND SPECIFIED) lancets standard Use to test blood sugar four times daily or as directed.     blood glucose (NO BRAND SPECIFIED) test strip 1 strip by In Vitro route 4 times daily (before meals and nightly) Use to test blood sugars 4 times daily or as directed     blood glucose monitoring (NO BRAND SPECIFIED) meter device kit Use to test blood sugar 4 times daily or as directed.     cephALEXin (KEFLEX) 500 MG capsule Take 1 capsule (500 mg) by mouth 4 times daily     HUMALOG KWIKPEN 100 UNIT/ML soln Inject 5 units with breakfast, lunch and dinner daily.     insulin glargine (BASAGLAR KWIKPEN) 100 UNIT/ML pen Inject 40 units subcutaneous each am and 40units each pm     insulin glargine (BASAGLAR KWIKPEN) 100 UNIT/ML pen Inject 40 units subcutaneous each am and each pm.     insulin pen needle (B-D U/F) 31G X 8 MM miscellaneous Use 1 pen needles daily or as directed.     insulin pen needle (BD PEN NEEDLE SCOTT 2ND GEN) 32G X 4 MM miscellaneous Use 3-4 daily.     naproxen (NAPROSYN) 500 MG tablet Take 1 tablet (500 mg) by mouth 2 times daily as needed for moderate pain Take with meals.     NOVOLOG FLEXPEN 100 UNIT/ML soln Inject 5 units with breakfast, lunch and dinner daily. (Patient not taking: Reported on 6/14/2019)     omeprazole (PRILOSEC) 20 MG CR capsule Take 1 capsule (20 mg) by mouth 2 times daily     ranitidine (ZANTAC) 150 MG tablet Take 1 tablet (150 mg) by mouth 2 times daily     Current Facility-Administered Medications   Medication     lidocaine (PF) (XYLOCAINE) 1 % injection 4 mL     triamcinolone (KENALOG-40) injection 40 mg       Allergies   Allergen Reactions     Metformin      Abdominal pain     Milk [Lac Bovis] Hives     Tylenol [Acetaminophen] Itching     Dulaglutide Rash       Physical Exam:    Vitals were reviewed.  All vitals stable  /80   Pulse 77   Ht 1.626 m (5'  "4\")   Wt 77.1 kg (170 lb)   BMI 29.18 kg/m     :  GENERAL:  well-developed, well-nourished, alert, oriented, in no acute distress.  HEENT:  Head is normocephalic, atraumatic   EYES:  Eyes have anicteric sclerae.    ENT:  Oropharynx is moist without exudates or ulcers.  Poor dentition  NECK:  Supple.  LUNGS:  Clear to auscultation.  CARDIOVASCULAR:  Regular rate and rhythm with no murmurs, gallops or rubs.  ABDOMEN:  Normal bowel sounds, soft, nontender.  SKIN: Multiple excoriated papules on extremities without surrounding erythema or discharge.  NEUROLOGIC:  Grossly nonfocal.    Laboratory Data:    Absolute CD4   Date Value Ref Range Status   06/07/2019 417 (L) 441 - 2,156 cells/uL Final   04/15/2019 394 (L) 441 - 2,156 cells/uL Final   11/06/2018 235 (L) 441 - 2,156 cells/uL Final   08/15/2018 317 (L) 441 - 2,156 cells/uL Final   07/19/2018 299 (L) 441 - 2,156 cells/uL Final   04/26/2018 299 (L) 441 - 2,156 cells/uL Final   03/07/2018 256 (L) 441 - 2,156 cells/uL Final   12/12/2017 225 (L) 441 - 2,156 cells/uL Final   08/11/2017 247 (L) 441 - 2,156 cells/uL Final   02/22/2017 183 (L) 441 - 2,156 cells/uL Final   11/07/2016 200 (L) 441 - 2,156 cells/uL Final   08/04/2016 181 (L) 441 - 2,156 cells/uL Final   05/20/2016 94 (L) 441 - 2,156 cells/uL Final   02/25/2016 56 (L) 441 - 2,156 cells/uL Final   01/10/2016 25 (L) 441 - 2,156 cells/uL Final   11/05/2015 31 (L) 441 - 2,156 cells/uL Final   10/06/2015 63 (L) 441 - 2,156 cells/uL Final   06/29/2015 141 (L) 441 - 2,156 cells/uL Final   05/26/2015 72 (L) 441 - 2,156 cells/uL Final   04/10/2015 26 (L) 441 - 2,156 cells/uL Final     Most recent HIV viral load  6/7/2019  147 copies/mL    ASSESSMENT:    - HIV/AIDS: Andrew was diagnosed with HIV/AIDS in 2015 when he presented with toxoplasmosis. He has had difficulty with adherence and previously been treated with Triumeq and Genvoya. He complained of food insecurity and wanted a regimen that did not need to be taken with " food. He was switched to Biktarvy on 7/19/2018 and he does not note any adverse effects from Biktarvy, and he is not noting any associated GI upset.      Since our last visit, Andrew lost housing and had several psychosocial upheavals, including a longterm stay of 3 weeks.  Therefore, medication adherence has been intermittent.  Andrew works closely with our SW Simon Cadena and has a medical case manager at [facility].  He feels relatively stable now, and is mainly staying at a shelter every night.  He feels he can take the Biktarvy daily now.  He continues to struggle to adhere to his Diabetes regimen.    Last labs 6/7/2019.  No need to repeat today.  Would aim for next HIV labs in September 2019.    - STI risk:     Reports always using condoms.  However, he is engaging in sex with men, and has a new partner.   His previous partner had Hepatitis C and a history of IVDU with heroin.   Andrew did not want to do testing if only for STIs today, but I did put in for future orders when he next decides to ask for blood work.  Last Syphilis test 11/2018 and last GC/Chlamydia testing was 2/2018. Last Hepatitis C test was 3/2017.    PLAN:     - continue Biktarvy    - Would aim for next HIV labs in September 2019     Orders Placed This Encounter   Procedures     Treponema Abs w Reflex to RPR and Titer     Hepatitis C antibody    GC/Chlamydia at all sites, urine, throat, and rectal      -Encouraged a dental visit.    -Patient needs follow-up with colorectal surgery for hx of LSIL on anal pap, but has no showed for multiple visits.    -Diabetes care per Endocrinology.    -Follow-up for routine HIV care in September, would try to coordinate with Endocrinology.      Preventative health care  Cardiovascular Juan Jose -               Lipids - CHOL 180,  (8/15/2018)               Blood Pressure -  /80 today   Endocrine: DMII. Followed by endocrine.  Cancer Screening              Colon - Up to date. Melissa              Anal -  Anal pap with colorectal 6/22/2018 with LSIL. Patient needs to follow up with colorectal for this, but has no-showed for multiple visits.  Immunizations              Hepatitis A - immune, serology 4/13/15              Hepatitis B - 6/16/2016, 3/10/2016, 8/31/2017              Influenza - not documented for 2018/2019 season              PCV 13 - 3/10/2016   Pneumovax - 6/16/2016              Tdap - 10/17/2016              Menactra - 8/31/2015 and 3/10/2016  Renal Health -  UA without proteinuria 12/19/2018 and creatinine normal last 4/7/2019  Sexually Transmitted Infection Risk and Screening              Gonorrhea and Chlamydia - GC/Chlamydia negative on 2/22/18, declined testing today, he has a boyfriend but says they are not sexually active.               Syphilis - negative 11/2018  Tobacco abuse: patient working on quitting and down to several cigarettes per day.       Ayala Prieto MD, MS  Infectious Disease      I spent 30 minutes in direct care with this patient, including >50% of time face-to-face with the patient counseling about STI risk, importance of adherence to HIV medications, and the importance of engagement in care for Diabetes.

## 2019-07-19 NOTE — LETTER
7/19/2019      RE: Andrew Lockwood  [address] [facility] [address]         Infectious Disease Clinic Note    Reason for visit:  Follow-up HIV infection     History of the Present Illness:  Mr. Andrew Lockwood is a 53 year old man with HIV previously followed by Dr. Rosado since his diagnosis in 2015.  She left the practice; he was seen 8/15/2018 by Dr. Carbajal, and then last 4/15/19 by me.      Since our last visit, Andrew did lose his housing.  He explains that this was due to having too many visitors and overnight guests.  He has a difficult time saying no to his other homeless friends.  He is mainly staying at a shelter for men in [facility] called [facility].  He finds it very difficult to follow a diabetic diet, as the food at shelters is very high carb.  Andrew jokes that in the summer the food is always pasta, and in the larsen always chili.    He reports easy tolerance to Biktarvy. When there is psychosocial chaos in his life, he does have lapses where he is not able to take the medication daily. For instance, he was recently in [facility] for 3 weeks [place]. When he is able to take it regularly, Andrew is very happy with the Biktarvy and reports no adverse effects. He was switched in 7/2018 to this regimen because he wanted something that did not require food.     Otherwise, Andrew reports a new boyfriend that is 26 years old. This young man is also homeless.  He does not do drugs, and for this Andrew is grateful.  His last partner was an injection drug user with heroin, and had Hepatitis C infection.  Andrew would like to avoid this in a partner in the future.  Andrew does engage in both anal receptive and penile insertive rectal intercourse.  He reports using condoms.  He is looking for help with erectile dysfunction to better enjoy his sex life.    Review of Systems:  12 point review as per HPI and otherwise negative    Past Medical History:   Diagnosis Date      AIDS (H)      Allergic rhinitis due to other allergen     DNS     Chronic abdominal pain      CNS toxoplasmosis (H)      Diabetes type 2, controlled (H)      GERD (gastroesophageal reflux disease)      HIV (human immunodeficiency virus infection) (H)      Periungual wart      Sleep apnea     doesn't use CPAP     HIV History  Date of Diagnosis: 4/2015  Approximated time of transmission: 2008  CD4 Josue: 25  Viral Load at Diagnosis: 171,654  Opportunistic Infections: Toxoplasmosis  CMV Status: IgG+ (4/12/15)  Toxo Status: + (4/12/15), Had CNS toxoplasmosis as his primary illness at the time of diagnosis  HLA  Status: 4/25/15 negative  Tuberculosis Screening: Exposure to a friend with TB, and treated for active TB several years ago. They did not live together.  Historical use of ARVs: Triumeq, Genvoya  Historical Resistance Mutations: Susceptible    Past Surgical History:   Procedure Laterality Date     C NONSPECIFIC PROCEDURE      right forearm fracture     COLONOSCOPY Left 1/22/2016    Procedure: COMBINED COLONOSCOPY, SINGLE OR MULTIPLE BIOPSY/POLYPECTOMY BY BIOPSY;  Surgeon: Clark Saini MD;  Location: [facility]     HC EXPLORE UNDESC TESTIS,INGUIN/SCROTAL       LAPAROSCOPIC APPENDECTOMY N/A 1/31/2018    Procedure: LAPAROSCOPIC APPENDECTOMY;  LAPAROSCOPIC APPENDECTOMY;  Surgeon: Dawn Holt MD;  [facility]: [facility]     LAPAROSCOPY DIAGNOSTIC (GENERAL) N/A 7/26/2016    Procedure: LAPAROSCOPY DIAGNOSTIC (GENERAL);  Surgeon: Susannah Arriaga MD;  [facility]     LAPAROSCOPY DIAGNOSTIC (GENERAL) N/A 4/16/2018    Procedure: LAPAROSCOPY DIAGNOSTIC (GENERAL);  Diagnostic laparoscopy and lysis of adhesions;  Surgeon: Prince Dowling MD;  [facility]: [facility]     OPTICAL TRACKING SYSTEM CRANIOTOMY, EXCISE TUMOR, COMBINED Left 4/10/2015    Procedure: COMBINED OPTICAL TRACKING SYSTEM CRANIOTOMY, EXCISE TUMOR;  Surgeon: Mirlande Colmenares MD;  [facility]     REPAIR GAMEKEEPER'S THUMB Right  2016    Procedure: REPAIR LIGAMENT ULNAR COLLATERAL THUMB (GAMEKEEPER'S);  Surgeon: Evin Zamorano MD;  [facility]       Family History   Problem Relation Age of Onset     Diabetes Brother      Diabetes Father      Alzheimer Disease Father      Unknown/Adopted Mother      Diabetes Paternal Grandfather      Cancer No family hx of         no skin cancer     Skin Cancer No family hx of         no famiy hx of skin cancer     Glaucoma No family hx of      Macular Degeneration No family hx of        Social History     Social History Narrative    Born in [facility].  Came to the [place] in .  Last traveled to visit family in .        2019 - Homeless. Working with a  at [facility] [employer] trying to get housing. Sexually active with two partners recently using condoms 100% of the time. Smokes occasionally, not for the last 4 weeks.     Social History     Tobacco Use     Smoking status: Current Some Day Smoker     Packs/day: 0.25     Types: Cigarettes     Last attempt to quit: 10/28/2016     Years since quittin.7     Smokeless tobacco: Former User   Substance Use Topics     Alcohol use: No     Alcohol/week: 0.0 oz     Comment: Last etoh in      Drug use: No       Immunization History   Administered Date(s) Administered     HepB 03/10/2016, 2016     HepB-Adult 2017     Influenza (IIV3) PF 10/17/2016     Influenza Vaccine IM 3yrs+ 4 Valent IIV4 2015, 2017     Mantoux Tuberculin Skin Test 2015     Meningococcal (Menactra ) 2015, 03/10/2016     Pneumo Conj 13-V (2010&after) 03/10/2016     Pneumococcal 23 valent 2016     TDAP Vaccine (Boostrix) 10/17/2016         Current Outpatient Medications   Medication Sig     bictegravir-emtricitabine-tenofovir (BIKTARVY) -25 MG per tablet Take 1 tablet by mouth daily     blood glucose (NO BRAND SPECIFIED) lancets standard Use to test blood sugar four times daily or as directed.     blood glucose (NO BRAND  "SPECIFIED) test strip 1 strip by In Vitro route 4 times daily (before meals and nightly) Use to test blood sugars 4 times daily or as directed     blood glucose monitoring (NO BRAND SPECIFIED) meter device kit Use to test blood sugar 4 times daily or as directed.     cephALEXin (KEFLEX) 500 MG capsule Take 1 capsule (500 mg) by mouth 4 times daily     HUMALOG KWIKPEN 100 UNIT/ML soln Inject 5 units with breakfast, lunch and dinner daily.     insulin glargine (BASAGLAR KWIKPEN) 100 UNIT/ML pen Inject 40 units subcutaneous each am and 40units each pm     insulin glargine (BASAGLAR KWIKPEN) 100 UNIT/ML pen Inject 40 units subcutaneous each am and each pm.     insulin pen needle (B-D U/F) 31G X 8 MM miscellaneous Use 1 pen needles daily or as directed.     insulin pen needle (BD PEN NEEDLE SCOTT 2ND GEN) 32G X 4 MM miscellaneous Use 3-4 daily.     naproxen (NAPROSYN) 500 MG tablet Take 1 tablet (500 mg) by mouth 2 times daily as needed for moderate pain Take with meals.     NOVOLOG FLEXPEN 100 UNIT/ML soln Inject 5 units with breakfast, lunch and dinner daily. (Patient not taking: Reported on 6/14/2019)     omeprazole (PRILOSEC) 20 MG CR capsule Take 1 capsule (20 mg) by mouth 2 times daily     ranitidine (ZANTAC) 150 MG tablet Take 1 tablet (150 mg) by mouth 2 times daily     Current Facility-Administered Medications   Medication     lidocaine (PF) (XYLOCAINE) 1 % injection 4 mL     triamcinolone (KENALOG-40) injection 40 mg       Allergies   Allergen Reactions     Metformin      Abdominal pain     Milk [Lac Bovis] Hives     Tylenol [Acetaminophen] Itching     Dulaglutide Rash       Physical Exam:    Vitals were reviewed.  All vitals stable  /80   Pulse 77   Ht 1.626 m (5' 4\")   Wt 77.1 kg (170 lb)   BMI 29.18 kg/m     :  GENERAL:  well-developed, well-nourished, alert, oriented, in no acute distress.  HEENT:  Head is normocephalic, atraumatic   EYES:  Eyes have anicteric sclerae.    ENT:  Oropharynx is moist " without exudates or ulcers.  Poor dentition  NECK:  Supple.  LUNGS:  Clear to auscultation.  CARDIOVASCULAR:  Regular rate and rhythm with no murmurs, gallops or rubs.  ABDOMEN:  Normal bowel sounds, soft, nontender.  SKIN: Multiple excoriated papules on extremities without surrounding erythema or discharge.  NEUROLOGIC:  Grossly nonfocal.    Laboratory Data:    Absolute CD4   Date Value Ref Range Status   06/07/2019 417 (L) 441 - 2,156 cells/uL Final   04/15/2019 394 (L) 441 - 2,156 cells/uL Final   11/06/2018 235 (L) 441 - 2,156 cells/uL Final   08/15/2018 317 (L) 441 - 2,156 cells/uL Final   07/19/2018 299 (L) 441 - 2,156 cells/uL Final   04/26/2018 299 (L) 441 - 2,156 cells/uL Final   03/07/2018 256 (L) 441 - 2,156 cells/uL Final   12/12/2017 225 (L) 441 - 2,156 cells/uL Final   08/11/2017 247 (L) 441 - 2,156 cells/uL Final   02/22/2017 183 (L) 441 - 2,156 cells/uL Final   11/07/2016 200 (L) 441 - 2,156 cells/uL Final   08/04/2016 181 (L) 441 - 2,156 cells/uL Final   05/20/2016 94 (L) 441 - 2,156 cells/uL Final   02/25/2016 56 (L) 441 - 2,156 cells/uL Final   01/10/2016 25 (L) 441 - 2,156 cells/uL Final   11/05/2015 31 (L) 441 - 2,156 cells/uL Final   10/06/2015 63 (L) 441 - 2,156 cells/uL Final   06/29/2015 141 (L) 441 - 2,156 cells/uL Final   05/26/2015 72 (L) 441 - 2,156 cells/uL Final   04/10/2015 26 (L) 441 - 2,156 cells/uL Final     Most recent HIV viral load  6/7/2019  147 copies/mL    ASSESSMENT:    - HIV/AIDS: Andrew was diagnosed with HIV/AIDS in 2015 when he presented with toxoplasmosis. He has had difficulty with adherence and previously been treated with Triumeq and Genvoya. He complained of food insecurity and wanted a regimen that did not need to be taken with food. He was switched to Biktarvy on 7/19/2018 and he does not note any adverse effects from Biktarvy, and he is not noting any associated GI upset.      Since our last visit, Andrew lost housing and had several psychosocial upheavals,  including a penitentiary stay of 3 weeks.  Therefore, medication adherence has been intermittent.  Andrew works closely with our SW Simon Rachellegiorgio and has a medical case manager at [facility].  He feels relatively stable now, and is mainly staying at a shelter every night.  He feels he can take the Biktarvy daily now.  He continues to struggle to adhere to his Diabetes regimen.    Last labs 6/7/2019.  No need to repeat today.  Would aim for next HIV labs in September 2019.    - STI risk:     Reports always using condoms.  However, he is engaging in sex with men, and has a new partner.   His previous partner had Hepatitis C and a history of IVDU with heroin.   Andrew did not want to do testing if only for STIs today, but I did put in for future orders when he next decides to ask for blood work.  Last Syphilis test 11/2018 and last GC/Chlamydia testing was 2/2018. Last Hepatitis C test was 3/2017.    PLAN:     - continue Biktarvy    - Would aim for next HIV labs in September 2019     Orders Placed This Encounter   Procedures     Treponema Abs w Reflex to RPR and Titer     Hepatitis C antibody    GC/Chlamydia at all sites, urine, throat, and rectal      -Encouraged a dental visit.    -Patient needs follow-up with colorectal surgery for hx of LSIL on anal pap, but has no showed for multiple visits.    -Diabetes care per Endocrinology.    -Follow-up for routine HIV care in September, would try to coordinate with Endocrinology.      Preventative health care  Cardiovascular Juan Jose -               Lipids - CHOL 180,  (8/15/2018)               Blood Pressure -  /80 today   Endocrine: DMII. Followed by endocrine.  Cancer Screening              Colon - Up to date. [facility]              Anal - Anal pap with colorectal 6/22/2018 with LSIL. Patient needs to follow up with colorectal for this, but has no-showed for multiple visits.  Immunizations              Hepatitis A - immune, serology 4/13/15              Hepatitis B -  6/16/2016, 3/10/2016, 8/31/2017              Influenza - not documented for 2018/2019 season              PCV 13 - 3/10/2016   Pneumovax - 6/16/2016              Tdap - 10/17/2016              Menactra - 8/31/2015 and 3/10/2016  Renal Health -  UA without proteinuria 12/19/2018 and creatinine normal last 4/7/2019  Sexually Transmitted Infection Risk and Screening              Gonorrhea and Chlamydia - GC/Chlamydia negative on 2/22/18, declined testing today, he has a boyfriend but says they are not sexually active.               Syphilis - negative 11/2018  Tobacco abuse: patient working on quitting and down to several cigarettes per day.       Ayala Prieto MD, MS  Infectious Disease      I spent 30 minutes in direct care with this patient, including >50% of time face-to-face with the patient counseling about STI risk, importance of adherence to HIV medications, and the importance of engagement in care for Diabetes.     Ayala Prieto MD

## 2019-07-19 NOTE — PROGRESS NOTES
HPI  Andrew Lockwood is a 51 year old male with type 2 diabetes mellitus here today for a follow up visit.  Pt states he was diagnosed with type 2 diabetes mellitus in 2014.  He tells me that he has had laser treatment in his left eye in the past.  He was seen here by ophthalmology and was diagnosed having an inferior and nasal peripheral horseshoe tear.    No known nephropathy or neuropathy.  His medical history is also significant for HIV/AIDS and CNS toxoplasmosis.  Pt is smoking less.  Andrew tells me he remains homeless.  Pt did not tolerate Trulicity- rash per pt, Metformin- GI distress, Januvia and Jardiance caused abdominal pain per patient.  For his diabetes, he is currently taking Basaglar 40 units subcutaneous each am and 40 units subcutaneous each pm. He take Novolog 5 units before each meal.  Pt's A1C was 10.2 % last visit.  Pt has 2 blood sugar readings and his FBS was 101 on 7/12 and evening blood sugar of 360 on 7/7/2019.  He denies missing his Basaglar doses.  On ROS today, he is smoking less.  Mild cough.  He denies any shortness of breath at rest.  Pt reports blurred vision.  Andrew eats food at the shelters.  Patient denies frequent headaches, nausea, vomiting, chest pain, abdominal pain or diarrhea.  He denies dysuria, hematuria, numbness tingling or pain in his feet toes hands or fingers.  He has ED.    Diabetes Care  Retinopathy: no DR. He was seen by Oph here in 3/2018 with inferior and nasal horseshoe tears left eye.  Nephropathy:none; urine microalbuminuria was negative in May 2017. Urine microalbuminuria + in 11/2018.  Neuropathy: none.  Foot Exam: no ulcers.  Taking aspirin:no.  Lipids:  in 8/2018.      ROS  Please see under history of present illness.    Allergies  Allergies   Allergen Reactions     Metformin      Abdominal pain     Milk [Lac Bovis] Hives     Tylenol [Acetaminophen] Itching     Dulaglutide Rash       Medications  Current Outpatient Medications   Medication Sig Dispense  Refill     Bictegravir-Emtricitab-Tenofov -25 MG TABS Take 1 tablet by mouth daily 30 tablet 11     blood glucose (NO BRAND SPECIFIED) lancets standard Use to test blood sugar four times daily or as directed. 100 each 11     blood glucose (NO BRAND SPECIFIED) test strip 1 strip by In Vitro route 4 times daily (before meals and nightly) Use to test blood sugars 4 times daily or as directed 100 strip 11     blood glucose monitoring (NO BRAND SPECIFIED) meter device kit Use to test blood sugar 4 times daily or as directed. 1 kit 0     cephALEXin (KEFLEX) 500 MG capsule Take 1 capsule (500 mg) by mouth 4 times daily 28 capsule 0     HUMALOG KWIKPEN 100 UNIT/ML soln Inject 5 units with breakfast, lunch and dinner daily. 15 mL 3     insulin glargine (BASAGLAR KWIKPEN) 100 UNIT/ML pen Inject 80 units subcutaneous each AM ONLY. 15 mL 0     insulin pen needle (B-D U/F) 31G X 8 MM miscellaneous Use 1 pen needles daily or as directed. 100 each 3     insulin pen needle (BD PEN NEEDLE SCOTT 2ND GEN) 32G X 4 MM miscellaneous Use 3-4 daily. 250 each 3     NOVOLOG FLEXPEN 100 UNIT/ML soln Inject 5 units with breakfast, lunch and dinner daily. (Patient not taking: Reported on 6/14/2019) 15 mL 3     omeprazole (PRILOSEC) 20 MG CR capsule Take 1 capsule (20 mg) by mouth 2 times daily 180 capsule 3     ranitidine (ZANTAC) 150 MG tablet Take 1 tablet (150 mg) by mouth 2 times daily 60 tablet 11       Family History  family history includes Alzheimer Disease in his father; Diabetes in his brother, father, and paternal grandfather; Unknown/Adopted in his mother.    Social History   reports that he has been smoking cigarettes.  He has been smoking about 0.25 packs per day. He has quit using smokeless tobacco. He reports that he does not drink alcohol or use drugs.   Pt is homeless at this time.    Past Medical History  Past Medical History:   Diagnosis Date     AIDS (H)      Allergic rhinitis due to other allergen     DNS     Chronic  "abdominal pain      CNS toxoplasmosis (H)      Diabetes type 2, controlled (H)      GERD (gastroesophageal reflux disease)      HIV (human immunodeficiency virus infection) (H)      Periungual wart      Sleep apnea     doesn't use CPAP       Past Surgical History:   Procedure Laterality Date     C NONSPECIFIC PROCEDURE      right forearm fracture     COLONOSCOPY Left 1/22/2016    Procedure: COMBINED COLONOSCOPY, SINGLE OR MULTIPLE BIOPSY/POLYPECTOMY BY BIOPSY;  Surgeon: Clark Saini MD;  [facility]: [facility]     HC EXPLORE UNDESC TESTIS,INGUIN/SCROTAL       LAPAROSCOPIC APPENDECTOMY N/A 1/31/2018    Procedure: LAPAROSCOPIC APPENDECTOMY;  LAPAROSCOPIC APPENDECTOMY;  Surgeon: Dawn Holt MD;  [facility]     LAPAROSCOPY DIAGNOSTIC (GENERAL) N/A 7/26/2016    Procedure: LAPAROSCOPY DIAGNOSTIC (GENERAL);  Surgeon: Susannah Arriaga MD;  [facility]     LAPAROSCOPY DIAGNOSTIC (GENERAL) N/A 4/16/2018    Procedure: LAPAROSCOPY DIAGNOSTIC (GENERAL);  Diagnostic laparoscopy and lysis of adhesions;  Surgeon: Prince Dowling MD;  [facility]     OPTICAL TRACKING SYSTEM CRANIOTOMY, EXCISE TUMOR, COMBINED Left 4/10/2015    Procedure: COMBINED OPTICAL TRACKING SYSTEM CRANIOTOMY, EXCISE TUMOR;  Surgeon: Mirlande Colmenares MD;  [facility]     REPAIR GAMEKEEPER'S THUMB Right 12/2/2016    Procedure: REPAIR LIGAMENT ULNAR COLLATERAL THUMB (GAMEKEEPER'S);  Surgeon: Evin Zamorano MD;  [facility]       Physical Exam  /80   Pulse 77   Ht 1.626 m (5' 4\")   Wt 77.2 kg (170 lb 3.2 oz)   BMI 29.21 kg/m    Body mass index is 29.21 kg/m .    GENERAL : In no apparent distress  FEET:  No ulcers; normal monofilamentous exam.    RESULTS  Creatinine   Date Value Ref Range Status   07/13/2019 0.76 0.66 - 1.25 mg/dL Final     GFR Estimate   Date Value Ref Range Status   07/13/2019 >90 >60 mL/min/[1.73_m2] Final     Comment:     Non  GFR Calc  Starting 12/18/2018, " serum creatinine based estimated GFR (eGFR) will be   calculated using the Chronic Kidney Disease Epidemiology Collaboration   (CKD-EPI) equation.       Hemoglobin A1C   Date Value Ref Range Status   06/07/2019 10.2 (H) 0 - 5.6 % Final     Comment:     Normal <5.7% Prediabetes 5.7-6.4%  Diabetes 6.5% or higher - adopted from ADA   consensus guidelines.       Potassium   Date Value Ref Range Status   07/13/2019 3.4 3.4 - 5.3 mmol/L Final     ALT   Date Value Ref Range Status   07/07/2019 35 0 - 70 U/L Final     AST   Date Value Ref Range Status   07/07/2019 22 0 - 45 U/L Final     TSH   Date Value Ref Range Status   02/15/2018 3.57 0.40 - 4.00 mU/L Final     T4 Free   Date Value Ref Range Status   04/14/2015 1.02 0.76 - 1.46 ng/dL Final       Cholesterol   Date Value Ref Range Status   08/15/2018 180 <200 mg/dL Final   08/11/2017 194 <200 mg/dL Final     HDL Cholesterol   Date Value Ref Range Status   08/15/2018 38 (L) >39 mg/dL Final   08/11/2017 37 (L) >39 mg/dL Final     LDL Cholesterol Calculated   Date Value Ref Range Status   08/15/2018 105 (H) <100 mg/dL Final     Comment:     Above desirable:  100-129 mg/dl  Borderline High:  130-159 mg/dL  High:             160-189 mg/dL  Very high:       >189 mg/dl     08/11/2017 88 <100 mg/dL Final     Comment:     Desirable:       <100 mg/dl     Triglycerides   Date Value Ref Range Status   08/15/2018 190 (H) <150 mg/dL Final     Comment:     Borderline high:  150-199 mg/dl  High:             200-499 mg/dl  Very high:       >499 mg/dl     08/11/2017 345 (H) <150 mg/dL Final     Comment:     Borderline high:  150-199 mg/dl   High:             200-499 mg/dl   Very high:       >499 mg/dl       Cholesterol/HDL Ratio   Date Value Ref Range Status   05/07/2015 5.0 0.0 - 5.0 Final   04/18/2005 6.3 (H) 0.0 - 5.0 Final     Lab Results   Component Value Date    A1C 6.0 08/11/2017    A1C 6.2 05/23/2017    A1C 6.4 01/23/2017    A1C 7.0 08/18/2016    A1C 8.3 06/16/2016     A1C  10.2 %  last visit.    ASSESSMENT/PLAN:    1. TYPE 2 DIABETES MELLITUS:  Uncontrolled type 2 diabetes mellitus.  Pt instructed to take Basaglar 40 units subcutaneous each am and each pm and continue Novolog 5 units with meals.  He has been seen by our CDE here.  His unused insulin will be stored in the refrigerator at the pharmacy per patient.  He is to check his fasting blood sugar each am and blood sugar before lunch and dinner DAILY.  He was instructed to see me in clinic with his glucose meter in 2 months.  He is normotensive today.  Will refer to Oph for annual eye exam.  Will also refer to Urology for ED per pt's request.    2. HIV/AIDS: Pt is followed here by ID staff.    3.  Return to Endocrine Clinic to see me in 2 months.

## 2019-07-19 NOTE — PROGRESS NOTES
Infectious Disease Clinic Note    Reason for visit:  Follow-up HIV infection     History of the Present Illness:  Mr. Andrew Lockwood is a 53 year old man with HIV previously followed by Dr. Rosado since his diagnosis in 2015.  She left the practice; he was seen 8/15/2018 by Dr. Carbajal, and then last 4/15/19 by me.      Since our last visit, Andrew did lose his housing.  He explains that this was due to having too many visitors and overnight guests.  He has a difficult time saying no to his other homeless friends.  He is mainly staying at a shelter for men in [facility] called [facility].  He finds it very difficult to follow a diabetic diet, as the food at shelters is very high carb.  Andrew jokes that in the summer the food is always pasta, and in the larsen always chili.    He reports easy tolerance to Biktarvy. When there is psychosocial chaos in his life, he does have lapses where he is not able to take the medication daily. For instance, he was recently in assisted [place] 3 [place]. When he is able to take it regularly, Andrew is very happy with the Biktarvy and reports no adverse effects. He was switched in 7/2018 to this regimen because he wanted something that did not require food.     Otherwise, Andrew reports a new boyfriend that is 26 years old. This young man is also homeless.  He does not do drugs, and for this Andrew is grateful.  His last partner was an injection drug user with heroin, and had Hepatitis C infection.  Andrew would like to avoid this in a partner in the future.  Andrew does engage in both anal receptive and penile insertive rectal intercourse.  He reports using condoms.  He is looking for help with erectile dysfunction to better enjoy his sex life.    Review of Systems:  12 point review as per HPI and otherwise negative    Past Medical History:   Diagnosis Date     AIDS (H)      Allergic rhinitis due to other allergen     DNS     Chronic  abdominal pain      CNS toxoplasmosis (H)      Diabetes type 2, controlled (H)      GERD (gastroesophageal reflux disease)      HIV (human immunodeficiency virus infection) (H)      Periungual wart      Sleep apnea     doesn't use CPAP     HIV History  Date of Diagnosis: 4/2015  Approximated time of transmission: 2008  CD4 Jsoue: 25  Viral Load at Diagnosis: 171,654  Opportunistic Infections: Toxoplasmosis  CMV Status: IgG+ (4/12/15)  Toxo Status: + (4/12/15), Had CNS toxoplasmosis as his primary illness at the time of diagnosis  HLA  Status: 4/25/15 negative  Tuberculosis Screening: Exposure to a friend with TB, and treated for active TB several years ago. They did not live together.  Historical use of ARVs: Triumeq, Genvoya  Historical Resistance Mutations: Susceptible    Past Surgical History:   Procedure Laterality Date     C NONSPECIFIC PROCEDURE      right forearm fracture     COLONOSCOPY Left 1/22/2016    Procedure: COMBINED COLONOSCOPY, SINGLE OR MULTIPLE BIOPSY/POLYPECTOMY BY BIOPSY;  Surgeon: Clark Saini MD;  Location: [facility]     HC EXPLORE UNDESC TESTIS,INGUIN/SCROTAL       LAPAROSCOPIC APPENDECTOMY N/A 1/31/2018    Procedure: LAPAROSCOPIC APPENDECTOMY;  LAPAROSCOPIC APPENDECTOMY;  Surgeon: Dawn Holt MD;  Location: [facility]     LAPAROSCOPY DIAGNOSTIC (GENERAL) N/A 7/26/2016    Procedure: LAPAROSCOPY DIAGNOSTIC (GENERAL);  Surgeon: Susannah Arriaga MD;  Location: [facility]     LAPAROSCOPY DIAGNOSTIC (GENERAL) N/A 4/16/2018    Procedure: LAPAROSCOPY DIAGNOSTIC (GENERAL);  Diagnostic laparoscopy and lysis of adhesions;  Surgeon: Prince Dowling MD;  Location: [facility]     OPTICAL TRACKING SYSTEM CRANIOTOMY, EXCISE TUMOR, COMBINED Left 4/10/2015    Procedure: COMBINED OPTICAL TRACKING SYSTEM CRANIOTOMY, EXCISE TUMOR;  Surgeon: Mirlande Colmenares MD;  Location: [facility]     REPAIR GAMEKEEPER'S THUMB Right 12/2/2016    Procedure: REPAIR LIGAMENT ULNAR COLLATERAL THUMB  (GAMEKEEPER'S);  Surgeon: Evin Zamorano MD;  Location:  OR       Family History   Problem Relation Age of Onset     Diabetes Brother      Diabetes Father      Alzheimer Disease Father      Unknown/Adopted Mother      Diabetes Paternal Grandfather      Cancer No family hx of         no skin cancer     Skin Cancer No family hx of         no famiy hx of skin cancer     Glaucoma No family hx of      Macular Degeneration No family hx of        Social History     Social History Narrative    Born in [facility].  [place]ame to the [place] in .  Last traveled to visit family in .        2019 - Homeless. Working with a  at [employer] trying to get housing. Sexually active with two partners recently using condoms 100% of the time. Smokes occasionally, not for the last 4 weeks.     Social History     Tobacco Use     Smoking status: Current Some Day Smoker     Packs/day: 0.25     Types: Cigarettes     Last attempt to quit: 10/28/2016     Years since quittin.7     Smokeless tobacco: Former User   Substance Use Topics     Alcohol use: No     Alcohol/week: 0.0 oz     Comment: Last etoh in      Drug use: No       Immunization History   Administered Date(s) Administered     HepB 03/10/2016, 2016     HepB-Adult 2017     Influenza (IIV3) PF 10/17/2016     Influenza Vaccine IM 3yrs+ 4 Valent IIV4 2015, 2017     Mantoux Tuberculin Skin Test 2015     Meningococcal (Menactra ) 2015, 03/10/2016     Pneumo Conj 13-V (2010&after) 03/10/2016     Pneumococcal 23 valent 2016     TDAP Vaccine (Boostrix) 10/17/2016         Current Outpatient Medications   Medication Sig     bictegravir-emtricitabine-tenofovir (BIKTARVY) -25 MG per tablet Take 1 tablet by mouth daily     blood glucose (NO BRAND SPECIFIED) lancets standard Use to test blood sugar four times daily or as directed.     blood glucose (NO BRAND SPECIFIED) test strip 1 strip by In Vitro route 4 times daily  "(before meals and nightly) Use to test blood sugars 4 times daily or as directed     blood glucose monitoring (NO BRAND SPECIFIED) meter device kit Use to test blood sugar 4 times daily or as directed.     cephALEXin (KEFLEX) 500 MG capsule Take 1 capsule (500 mg) by mouth 4 times daily     HUMALOG KWIKPEN 100 UNIT/ML soln Inject 5 units with breakfast, lunch and dinner daily.     insulin glargine (BASAGLAR KWIKPEN) 100 UNIT/ML pen Inject 40 units subcutaneous each am and 40units each pm     insulin glargine (BASAGLAR KWIKPEN) 100 UNIT/ML pen Inject 40 units subcutaneous each am and each pm.     insulin pen needle (B-D U/F) 31G X 8 MM miscellaneous Use 1 pen needles daily or as directed.     insulin pen needle (BD PEN NEEDLE SCOTT 2ND GEN) 32G X 4 MM miscellaneous Use 3-4 daily.     naproxen (NAPROSYN) 500 MG tablet Take 1 tablet (500 mg) by mouth 2 times daily as needed for moderate pain Take with meals.     NOVOLOG FLEXPEN 100 UNIT/ML soln Inject 5 units with breakfast, lunch and dinner daily. (Patient not taking: Reported on 6/14/2019)     omeprazole (PRILOSEC) 20 MG CR capsule Take 1 capsule (20 mg) by mouth 2 times daily     ranitidine (ZANTAC) 150 MG tablet Take 1 tablet (150 mg) by mouth 2 times daily     Current Facility-Administered Medications   Medication     lidocaine (PF) (XYLOCAINE) 1 % injection 4 mL     triamcinolone (KENALOG-40) injection 40 mg       Allergies   Allergen Reactions     Metformin      Abdominal pain     Milk [Lac Bovis] Hives     Tylenol [Acetaminophen] Itching     Dulaglutide Rash       Physical Exam:    Vitals were reviewed.  All vitals stable  /80   Pulse 77   Ht 1.626 m (5' 4\")   Wt 77.1 kg (170 lb)   BMI 29.18 kg/m    :  GENERAL:  well-developed, well-nourished, alert, oriented, in no acute distress.  HEENT:  Head is normocephalic, atraumatic   EYES:  Eyes have anicteric sclerae.    ENT:  Oropharynx is moist without exudates or ulcers.  Poor dentition  NECK:  " Supple.  LUNGS:  Clear to auscultation.  CARDIOVASCULAR:  Regular rate and rhythm with no murmurs, gallops or rubs.  ABDOMEN:  Normal bowel sounds, soft, nontender.  SKIN: Multiple excoriated papules on extremities without surrounding erythema or discharge.  NEUROLOGIC:  Grossly nonfocal.    Laboratory Data:    Absolute CD4   Date Value Ref Range Status   06/07/2019 417 (L) 441 - 2,156 cells/uL Final   04/15/2019 394 (L) 441 - 2,156 cells/uL Final   11/06/2018 235 (L) 441 - 2,156 cells/uL Final   08/15/2018 317 (L) 441 - 2,156 cells/uL Final   07/19/2018 299 (L) 441 - 2,156 cells/uL Final   04/26/2018 299 (L) 441 - 2,156 cells/uL Final   03/07/2018 256 (L) 441 - 2,156 cells/uL Final   12/12/2017 225 (L) 441 - 2,156 cells/uL Final   08/11/2017 247 (L) 441 - 2,156 cells/uL Final   02/22/2017 183 (L) 441 - 2,156 cells/uL Final   11/07/2016 200 (L) 441 - 2,156 cells/uL Final   08/04/2016 181 (L) 441 - 2,156 cells/uL Final   05/20/2016 94 (L) 441 - 2,156 cells/uL Final   02/25/2016 56 (L) 441 - 2,156 cells/uL Final   01/10/2016 25 (L) 441 - 2,156 cells/uL Final   11/05/2015 31 (L) 441 - 2,156 cells/uL Final   10/06/2015 63 (L) 441 - 2,156 cells/uL Final   06/29/2015 141 (L) 441 - 2,156 cells/uL Final   05/26/2015 72 (L) 441 - 2,156 cells/uL Final   04/10/2015 26 (L) 441 - 2,156 cells/uL Final     Most recent HIV viral load  6/7/2019  147 copies/mL    ASSESSMENT:    - HIV/AIDS: Andrew was diagnosed with HIV/AIDS in 2015 when he presented with toxoplasmosis. He has had difficulty with adherence and previously been treated with Triumeq and Genvoya. He complained of food insecurity and wanted a regimen that did not need to be taken with food. He was switched to Biktarvy on 7/19/2018 and he does not note any adverse effects from Biktarvy, and he is not noting any associated GI upset.      Since our last visit, Andrew lost housing and had several psychosocial upheavals, including a senior care stay of 3 weeks.  Therefore, medication  adherence has been intermittent.  Andrew works closely with our SW Simon Cadena and has a medical case manager at [facility].  He feels relatively stable now, and is mainly staying at a shelter every night.  He feels he can take the Biktarvy daily now.  He continues to struggle to adhere to his Diabetes regimen.    Last labs 6/7/2019.  No need to repeat today.  Would aim for next HIV labs in September 2019.    - STI risk:     Reports always using condoms.  However, he is engaging in sex with men, and has a new partner.   His previous partner had Hepatitis C and a history of IVDU with heroin.   Andrew did not want to do testing if only for STIs today, but I did put in for future orders when he next decides to ask for blood work.  Last Syphilis test 11/2018 and last GC/Chlamydia testing was 2/2018. Last Hepatitis C test was 3/2017.    PLAN:     - continue Biktarvy    - Would aim for next HIV labs in September 2019     Orders Placed This Encounter   Procedures     Treponema Abs w Reflex to RPR and Titer     Hepatitis C antibody    GC/Chlamydia at all sites, urine, throat, and rectal      -Encouraged a dental visit.    -Patient needs follow-up with colorectal surgery for hx of LSIL on anal pap, but has no showed for multiple visits.    -Diabetes care per Endocrinology.    -Follow-up for routine HIV care in September, would try to coordinate with Endocrinology.      Preventative health care  Cardiovascular Juan Jose -               Lipids - CHOL 180,  (8/15/2018)               Blood Pressure -  /80 today   Endocrine: DMII. Followed by endocrine.  Cancer Screening              Colon - Up to date. [facility]              Anal - Anal pap with colorectal 6/22/2018 with LSIL. Patient needs to follow up with colorectal for this, but has no-showed for multiple visits.  Immunizations              Hepatitis A - immune, serology 4/13/15              Hepatitis B - 6/16/2016, 3/10/2016, 8/31/2017              Influenza -  not documented for 2018/2019 season              PCV 13 - 3/10/2016   Pneumovax - 6/16/2016              Tdap - 10/17/2016              Menactra - 8/31/2015 and 3/10/2016  Renal Health -  UA without proteinuria 12/19/2018 and creatinine normal last 4/7/2019  Sexually Transmitted Infection Risk and Screening              Gonorrhea and Chlamydia - GC/Chlamydia negative on 2/22/18, declined testing today, he has a boyfriend but says they are not sexually active.               Syphilis - negative 11/2018  Tobacco abuse: patient working on quitting and down to several cigarettes per day.       Ayala Prieto MD, MS  Infectious Disease      I spent 30 minutes in direct care with this patient, including >50% of time face-to-face with the patient counseling about STI risk, importance of adherence to HIV medications, and the importance of engagement in care for Diabetes.

## 2019-07-19 NOTE — LETTER
7/19/2019       RE: Andrew Lockwood  [address] [facility] [address]     Dear Colleague,    Thank you for referring your patient, Andrew Lockwood, to the [facility] AND INFECTIOUS DISEASES at [facility]. Please see a copy of my visit note below.      Infectious Disease Clinic Note    Reason for visit:  Follow-up HIV infection     History of the Present Illness:  Mr. Andrew Lockwood is a 53 year old man with HIV previously followed by Dr. Rosado since his diagnosis in 2015.  She left the practice; he was seen 8/15/2018 by Dr. Carbajal, and then last 4/15/19 by me.      Since our last visit, Andrew did lose his housing.  He explains that this was due to having too many visitors and overnight guests.  He has a difficult time saying no to his other homeless friends.  He [facility].  He finds it very difficult to follow a diabetic diet, as the food at shelters is very high carb.  Andrew jokes that in the summer the food is always pasta, and in the larsen always chili.    He reports easy tolerance to Biktarvy. When there is psychosocial chaos in his life, he does have lapses where he is not able to take the medication daily. For instance, he was recently in penitentiary [place] 3 [place]. When he is able to take it regularly, Andrew is very happy with the Biktarvy and reports no adverse effects. He was switched in 7/2018 to this regimen because he wanted something that did not require food.     Otherwise, Andrew reports a new boyfriend that is 26 years old. This young man is also homeless.  He does not do drugs, and for this Andrew is grateful.  His last partner was an injection drug user with heroin, and had Hepatitis C infection.  Andrew would like to avoid this in a partner in the future.  Andrew does engage in both anal receptive and penile insertive rectal  intercourse.  He reports using condoms.  He is looking for help with erectile dysfunction to better enjoy his sex life.    Review of Systems:  12 point review as per HPI and otherwise negative    Past Medical History:   Diagnosis Date     AIDS (H)      Allergic rhinitis due to other allergen     DNS     Chronic abdominal pain      CNS toxoplasmosis (H)      Diabetes type 2, controlled (H)      GERD (gastroesophageal reflux disease)      HIV (human immunodeficiency virus infection) (H)      Periungual wart      Sleep apnea     doesn't use CPAP     HIV History  Date of Diagnosis: 4/2015  Approximated time of transmission: 2008  CD4 Josue: 25  Viral Load at Diagnosis: 171,654  Opportunistic Infections: Toxoplasmosis  CMV Status: IgG+ (4/12/15)  Toxo Status: + (4/12/15), Had CNS toxoplasmosis as his primary illness at the time of diagnosis  HLA  Status: 4/25/15 negative  Tuberculosis Screening: Exposure to a friend with TB, and treated for active TB several years ago. They did not live together.  Historical use of ARVs: Triumeq, Genvoya  Historical Resistance Mutations: Susceptible    Past Surgical History:   Procedure Laterality Date     C NONSPECIFIC PROCEDURE      right forearm fracture     COLONOSCOPY Left 1/22/2016    Procedure: COMBINED COLONOSCOPY, SINGLE OR MULTIPLE BIOPSY/POLYPECTOMY BY BIOPSY;  Surgeon: Clark Saini MD;  [facility]     HC EXPLORE UNDESC TESTIS,INGUIN/SCROTAL       LAPAROSCOPIC APPENDECTOMY N/A 1/31/2018    Procedure: LAPAROSCOPIC APPENDECTOMY;  LAPAROSCOPIC APPENDECTOMY;  Surgeon: Dawn Holt MD;  [facility]     LAPAROSCOPY DIAGNOSTIC (GENERAL) N/A 7/26/2016    Procedure: LAPAROSCOPY DIAGNOSTIC (GENERAL);  Surgeon: Susannah Arriaga MD;  [facility]     LAPAROSCOPY DIAGNOSTIC (GENERAL) N/A 4/16/2018    Procedure: LAPAROSCOPY DIAGNOSTIC (GENERAL);  Diagnostic laparoscopy and lysis of adhesions;  Surgeon: Prince Dowling MD;  [facility]      OPTICAL TRACKING SYSTEM CRANIOTOMY, EXCISE TUMOR, COMBINED Left 4/10/2015    Procedure: COMBINED OPTICAL TRACKING SYSTEM CRANIOTOMY, EXCISE TUMOR;  Surgeon: Mirlande Colmenares MD;  [facility]     REPAIR GAMEKEEPER'S THUMB Right 2016    Procedure: REPAIR LIGAMENT ULNAR COLLATERAL THUMB (GAMEKEEPER'S);  Surgeon: Evin Zamorano MD;  [facility]       Family History   Problem Relation Age of Onset     Diabetes Brother      Diabetes Father      Alzheimer Disease Father      Unknown/Adopted Mother      Diabetes Paternal Grandfather      Cancer No family hx of         no skin cancer     Skin Cancer No family hx of         no famiy hx of skin cancer     Glaucoma No family hx of      Macular Degeneration No family hx of        Social History     Social History Narrative    Born in [facility].  [place] the [place] in .  Last traveled to visit family in .        2019 - Homeless. Working with a  at [employer] trying to get housing. Sexually active with two partners recently using condoms 100% of the time. Smokes occasionally, not for the last 4 weeks.     Social History     Tobacco Use     Smoking status: Current Some Day Smoker     Packs/day: 0.25     Types: Cigarettes     Last attempt to quit: 10/28/2016     Years since quittin.7     Smokeless tobacco: Former User   Substance Use Topics     Alcohol use: No     Alcohol/week: 0.0 oz     Comment: Last etoh in      Drug use: No       Immunization History   Administered Date(s) Administered     HepB 03/10/2016, 2016     HepB-Adult 2017     Influenza (IIV3) PF 10/17/2016     Influenza Vaccine IM 3yrs+ 4 Valent IIV4 2015, 2017     Mantoux Tuberculin Skin Test 2015     Meningococcal (Menactra ) 2015, 03/10/2016     Pneumo Conj 13-V (2010&after) 03/10/2016     Pneumococcal 23 valent 2016     TDAP Vaccine (Boostrix) 10/17/2016         Current Outpatient Medications   Medication Sig      bictegravir-emtricitabine-tenofovir (BIKTARVY) -25 MG per tablet Take 1 tablet by mouth daily     blood glucose (NO BRAND SPECIFIED) lancets standard Use to test blood sugar four times daily or as directed.     blood glucose (NO BRAND SPECIFIED) test strip 1 strip by In Vitro route 4 times daily (before meals and nightly) Use to test blood sugars 4 times daily or as directed     blood glucose monitoring (NO BRAND SPECIFIED) meter device kit Use to test blood sugar 4 times daily or as directed.     cephALEXin (KEFLEX) 500 MG capsule Take 1 capsule (500 mg) by mouth 4 times daily     HUMALOG KWIKPEN 100 UNIT/ML soln Inject 5 units with breakfast, lunch and dinner daily.     insulin glargine (BASAGLAR KWIKPEN) 100 UNIT/ML pen Inject 40 units subcutaneous each am and 40units each pm     insulin glargine (BASAGLAR KWIKPEN) 100 UNIT/ML pen Inject 40 units subcutaneous each am and each pm.     insulin pen needle (B-D U/F) 31G X 8 MM miscellaneous Use 1 pen needles daily or as directed.     insulin pen needle (BD PEN NEEDLE SCOTT 2ND GEN) 32G X 4 MM miscellaneous Use 3-4 daily.     naproxen (NAPROSYN) 500 MG tablet Take 1 tablet (500 mg) by mouth 2 times daily as needed for moderate pain Take with meals.     NOVOLOG FLEXPEN 100 UNIT/ML soln Inject 5 units with breakfast, lunch and dinner daily. (Patient not taking: Reported on 6/14/2019)     omeprazole (PRILOSEC) 20 MG CR capsule Take 1 capsule (20 mg) by mouth 2 times daily     ranitidine (ZANTAC) 150 MG tablet Take 1 tablet (150 mg) by mouth 2 times daily     Current Facility-Administered Medications   Medication     lidocaine (PF) (XYLOCAINE) 1 % injection 4 mL     triamcinolone (KENALOG-40) injection 40 mg       Allergies   Allergen Reactions     Metformin      Abdominal pain     Milk [Lac Bovis] Hives     Tylenol [Acetaminophen] Itching     Dulaglutide Rash       Physical Exam:    Vitals were reviewed.  All vitals stable  /80   Pulse 77   Ht 1.626 m (5'  "4\")   Wt 77.1 kg (170 lb)   BMI 29.18 kg/m     :  GENERAL:  well-developed, well-nourished, alert, oriented, in no acute distress.  HEENT:  Head is normocephalic, atraumatic   EYES:  Eyes have anicteric sclerae.    ENT:  Oropharynx is moist without exudates or ulcers.  Poor dentition  NECK:  Supple.  LUNGS:  Clear to auscultation.  CARDIOVASCULAR:  Regular rate and rhythm with no murmurs, gallops or rubs.  ABDOMEN:  Normal bowel sounds, soft, nontender.  SKIN: Multiple excoriated papules on extremities without surrounding erythema or discharge.  NEUROLOGIC:  Grossly nonfocal.    Laboratory Data:    Absolute CD4   Date Value Ref Range Status   06/07/2019 417 (L) 441 - 2,156 cells/uL Final   04/15/2019 394 (L) 441 - 2,156 cells/uL Final   11/06/2018 235 (L) 441 - 2,156 cells/uL Final   08/15/2018 317 (L) 441 - 2,156 cells/uL Final   07/19/2018 299 (L) 441 - 2,156 cells/uL Final   04/26/2018 299 (L) 441 - 2,156 cells/uL Final   03/07/2018 256 (L) 441 - 2,156 cells/uL Final   12/12/2017 225 (L) 441 - 2,156 cells/uL Final   08/11/2017 247 (L) 441 - 2,156 cells/uL Final   02/22/2017 183 (L) 441 - 2,156 cells/uL Final   11/07/2016 200 (L) 441 - 2,156 cells/uL Final   08/04/2016 181 (L) 441 - 2,156 cells/uL Final   05/20/2016 94 (L) 441 - 2,156 cells/uL Final   02/25/2016 56 (L) 441 - 2,156 cells/uL Final   01/10/2016 25 (L) 441 - 2,156 cells/uL Final   11/05/2015 31 (L) 441 - 2,156 cells/uL Final   10/06/2015 63 (L) 441 - 2,156 cells/uL Final   06/29/2015 141 (L) 441 - 2,156 cells/uL Final   05/26/2015 72 (L) 441 - 2,156 cells/uL Final   04/10/2015 26 (L) 441 - 2,156 cells/uL Final     Most recent HIV viral load  6/7/2019  147 copies/mL    ASSESSMENT:    - HIV/AIDS: Andrew was diagnosed with HIV/AIDS in 2015 when he presented with toxoplasmosis. He has had difficulty with adherence and previously been treated with Triumeq and Genvoya. He complained of food insecurity and wanted a regimen that did not need to be taken with " food. He was switched to Biktarvy on 7/19/2018 and he does not note any adverse effects from Biktarvy, and he is not noting any associated GI upset.      Since our last visit, Andrew lost housing and had several psychosocial upheavals, including a retirement stay of 3 weeks.  Therefore, medication adherence has been intermittent.  Andrew works closely with our SW Simon Cadena and has a medical case manager at [facility].  He feels relatively stable now, and is mainly staying at a shelter every night.  He feels he can take the Biktarvy daily now.  He continues to struggle to adhere to his Diabetes regimen.    Last labs 6/7/2019.  No need to repeat today.  Would aim for next HIV labs in September 2019.    - STI risk:     Reports always using condoms.  However, he is engaging in sex with men, and has a new partner.   His previous partner had Hepatitis C and a history of IVDU with heroin.   Andrew did not want to do testing if only for STIs today, but I did put in for future orders when he next decides to ask for blood work.  Last Syphilis test 11/2018 and last GC/Chlamydia testing was 2/2018. Last Hepatitis C test was 3/2017.    PLAN:     - continue Biktarvy    - Would aim for next HIV labs in September 2019     Orders Placed This Encounter   Procedures     Treponema Abs w Reflex to RPR and Titer     Hepatitis C antibody    GC/Chlamydia at all sites, urine, throat, and rectal      -Encouraged a dental visit.    -Patient needs follow-up with colorectal surgery for hx of LSIL on anal pap, but has no showed for multiple visits.    -Diabetes care per Endocrinology.    -Follow-up for routine HIV care in September, would try to coordinate with Endocrinology.      Preventative health care  Cardiovascular Juan Jose -               Lipids - CHOL 180,  (8/15/2018)               Blood Pressure -  /80 today   Endocrine: DMII. Followed by endocrine.  Cancer Screening              Colon - Up to date. Melissa              Anal -  Anal pap with colorectal 6/22/2018 with LSIL. Patient needs to follow up with colorectal for this, but has no-showed for multiple visits.  Immunizations              Hepatitis A - immune, serology 4/13/15              Hepatitis B - 6/16/2016, 3/10/2016, 8/31/2017              Influenza - not documented for 2018/2019 season              PCV 13 - 3/10/2016   Pneumovax - 6/16/2016              Tdap - 10/17/2016              Menactra - 8/31/2015 and 3/10/2016  Renal Health -  UA without proteinuria 12/19/2018 and creatinine normal last 4/7/2019  Sexually Transmitted Infection Risk and Screening              Gonorrhea and Chlamydia - GC/Chlamydia negative on 2/22/18, declined testing today, he has a boyfriend but says they are not sexually active.               Syphilis - negative 11/2018  Tobacco abuse: patient working on quitting and down to several cigarettes per day.       Ayala Prieto MD, MS  Infectious Disease      I spent 30 minutes in direct care with this patient, including >50% of time face-to-face with the patient counseling about STI risk, importance of adherence to HIV medications, and the importance of engagement in care for Diabetes.

## 2019-07-26 ENCOUNTER — HOSPITAL ENCOUNTER (EMERGENCY)
Facility: CLINIC | Age: 54
Discharge: HOME | End: 2019-07-26
Attending: EMERGENCY MEDICINE | Admitting: EMERGENCY MEDICINE
Payer: COMMERCIAL

## 2019-07-26 VITALS
DIASTOLIC BLOOD PRESSURE: 88 MMHG | WEIGHT: 165 LBS | HEART RATE: 93 BPM | RESPIRATION RATE: 14 BRPM | TEMPERATURE: 97.3 F | SYSTOLIC BLOOD PRESSURE: 144 MMHG | OXYGEN SATURATION: 99 % | BODY MASS INDEX: 28.32 KG/M2

## 2019-07-26 DIAGNOSIS — E11.65 INADEQUATELY CONTROLLED DIABETES MELLITUS (H): ICD-10-CM

## 2019-07-26 DIAGNOSIS — R10.13 EPIGASTRIC PAIN: ICD-10-CM

## 2019-07-26 DIAGNOSIS — F17.210 CIGARETTE SMOKER: ICD-10-CM

## 2019-07-26 DIAGNOSIS — Z79.4 ENCOUNTER FOR LONG-TERM (CURRENT) USE OF INSULIN (H): ICD-10-CM

## 2019-07-26 LAB
ALBUMIN SERPL-MCNC: 3.6 G/DL (ref 3.4–5)
ALP SERPL-CCNC: 129 U/L (ref 40–150)
ALT SERPL W P-5'-P-CCNC: 33 U/L (ref 0–70)
ANION GAP SERPL CALCULATED.3IONS-SCNC: 9 MMOL/L (ref 3–14)
AST SERPL W P-5'-P-CCNC: 25 U/L (ref 0–45)
BASOPHILS # BLD AUTO: 0.1 10E9/L (ref 0–0.2)
BASOPHILS NFR BLD AUTO: 0.8 %
BILIRUB SERPL-MCNC: 0.6 MG/DL (ref 0.2–1.3)
BUN SERPL-MCNC: 19 MG/DL (ref 7–30)
CALCIUM SERPL-MCNC: 9 MG/DL (ref 8.5–10.1)
CHLORIDE SERPL-SCNC: 101 MMOL/L (ref 94–109)
CO2 SERPL-SCNC: 26 MMOL/L (ref 20–32)
CREAT SERPL-MCNC: 0.7 MG/DL (ref 0.66–1.25)
DIFFERENTIAL METHOD BLD: NORMAL
EOSINOPHIL # BLD AUTO: 0.3 10E9/L (ref 0–0.7)
EOSINOPHIL NFR BLD AUTO: 3.4 %
ERYTHROCYTE [DISTWIDTH] IN BLOOD BY AUTOMATED COUNT: 13 % (ref 10–15)
GFR SERPL CREATININE-BSD FRML MDRD: >90 ML/MIN/{1.73_M2}
GLUCOSE BLDC GLUCOMTR-MCNC: 186 MG/DL (ref 70–99)
GLUCOSE BLDC GLUCOMTR-MCNC: 347 MG/DL (ref 70–99)
GLUCOSE SERPL-MCNC: 381 MG/DL (ref 70–99)
HCT VFR BLD AUTO: 43.8 % (ref 40–53)
HGB BLD-MCNC: 14.5 G/DL (ref 13.3–17.7)
IMM GRANULOCYTES # BLD: 0.2 10E9/L (ref 0–0.4)
IMM GRANULOCYTES NFR BLD: 2.3 %
LYMPHOCYTES # BLD AUTO: 1.6 10E9/L (ref 0.8–5.3)
LYMPHOCYTES NFR BLD AUTO: 19.2 %
MCH RBC QN AUTO: 30.5 PG (ref 26.5–33)
MCHC RBC AUTO-ENTMCNC: 33.1 G/DL (ref 31.5–36.5)
MCV RBC AUTO: 92 FL (ref 78–100)
MONOCYTES # BLD AUTO: 0.5 10E9/L (ref 0–1.3)
MONOCYTES NFR BLD AUTO: 6.4 %
NEUTROPHILS # BLD AUTO: 5.7 10E9/L (ref 1.6–8.3)
NEUTROPHILS NFR BLD AUTO: 67.9 %
NRBC # BLD AUTO: 0 10*3/UL
NRBC BLD AUTO-RTO: 0 /100
PLATELET # BLD AUTO: 387 10E9/L (ref 150–450)
POTASSIUM SERPL-SCNC: 3.6 MMOL/L (ref 3.4–5.3)
PROT SERPL-MCNC: 7.7 G/DL (ref 6.8–8.8)
RBC # BLD AUTO: 4.75 10E12/L (ref 4.4–5.9)
SODIUM SERPL-SCNC: 136 MMOL/L (ref 133–144)
TROPONIN I SERPL-MCNC: <0.015 UG/L (ref 0–0.04)
WBC # BLD AUTO: 8.3 10E9/L (ref 4–11)

## 2019-07-26 PROCEDURE — 84484 ASSAY OF TROPONIN QUANT: CPT | Performed by: EMERGENCY MEDICINE

## 2019-07-26 PROCEDURE — 93010 ELECTROCARDIOGRAM REPORT: CPT | Mod: Z6 | Performed by: EMERGENCY MEDICINE

## 2019-07-26 PROCEDURE — 85025 COMPLETE CBC W/AUTO DIFF WBC: CPT | Performed by: EMERGENCY MEDICINE

## 2019-07-26 PROCEDURE — 25000131 ZZH RX MED GY IP 250 OP 636 PS 637: Performed by: EMERGENCY MEDICINE

## 2019-07-26 PROCEDURE — 93005 ELECTROCARDIOGRAM TRACING: CPT

## 2019-07-26 PROCEDURE — 96361 HYDRATE IV INFUSION ADD-ON: CPT

## 2019-07-26 PROCEDURE — 25000132 ZZH RX MED GY IP 250 OP 250 PS 637: Performed by: EMERGENCY MEDICINE

## 2019-07-26 PROCEDURE — 96360 HYDRATION IV INFUSION INIT: CPT

## 2019-07-26 PROCEDURE — 25000128 H RX IP 250 OP 636: Performed by: EMERGENCY MEDICINE

## 2019-07-26 PROCEDURE — 80053 COMPREHEN METABOLIC PANEL: CPT | Performed by: EMERGENCY MEDICINE

## 2019-07-26 PROCEDURE — 25000125 ZZHC RX 250: Performed by: EMERGENCY MEDICINE

## 2019-07-26 PROCEDURE — 99284 EMERGENCY DEPT VISIT MOD MDM: CPT | Mod: 25

## 2019-07-26 PROCEDURE — 99284 EMERGENCY DEPT VISIT MOD MDM: CPT | Mod: 25 | Performed by: EMERGENCY MEDICINE

## 2019-07-26 PROCEDURE — 00000146 ZZHCL STATISTIC GLUCOSE BY METER IP

## 2019-07-26 PROCEDURE — 96372 THER/PROPH/DIAG INJ SC/IM: CPT | Mod: 59

## 2019-07-26 RX ORDER — SODIUM CHLORIDE 9 MG/ML
1000 INJECTION, SOLUTION INTRAVENOUS CONTINUOUS
Status: DISCONTINUED | OUTPATIENT
Start: 2019-07-26 | End: 2019-07-27 | Stop reason: HOSPADM

## 2019-07-26 RX ADMIN — INSULIN ASPART 8 UNITS: 100 INJECTION, SOLUTION INTRAVENOUS; SUBCUTANEOUS at 22:13

## 2019-07-26 RX ADMIN — LIDOCAINE HYDROCHLORIDE 30 ML: 20 SOLUTION ORAL; TOPICAL at 20:54

## 2019-07-26 RX ADMIN — SODIUM CHLORIDE 1000 ML: 9 INJECTION, SOLUTION INTRAVENOUS at 20:16

## 2019-07-26 ASSESSMENT — ENCOUNTER SYMPTOMS
HEADACHES: 1
ABDOMINAL DISTENTION: 1

## 2019-07-26 NOTE — ED AVS SNAPSHOT
[facility], [place], Emergency Department  [address] [place] [address]  Phone:  [phone]                                    Andrew Lockwood   MRN: [id number]    Department:  [facility], Emergency Department   Date of Visit:  7/26/2019           After Visit Summary Signature Page    I have received my discharge instructions, and my questions have been answered. I have discussed any challenges I see with this plan with the nurse or doctor.    ..........................................................................................................................................  Patient/Patient Representative Signature      ..........................................................................................................................................  Patient Representative Print Name and Relationship to Patient    ..................................................               ................................................  Date                                   Time    ..........................................................................................................................................  Reviewed by Signature/Title    ...................................................              ..............................................  Date                                               Time          22EPIC Rev 08/18

## 2019-07-26 NOTE — ED TRIAGE NOTES
Pt arrived from [facility] with abdominal pain, headache, nausea and vomiting. On arrival . Pt reports that he has not taken his insulin in two days.

## 2019-07-27 LAB — INTERPRETATION ECG - MUSE: NORMAL

## 2019-08-02 DIAGNOSIS — E11.65 TYPE 2 DIABETES MELLITUS WITH HYPERGLYCEMIA, WITHOUT LONG-TERM CURRENT USE OF INSULIN (H): ICD-10-CM

## 2019-08-02 RX ORDER — INSULIN GLARGINE 100 [IU]/ML
INJECTION, SOLUTION SUBCUTANEOUS
Qty: 15 ML | Refills: 11 | Status: ON HOLD | OUTPATIENT
Start: 2019-08-02 | End: 2019-11-12

## 2019-08-02 NOTE — TELEPHONE ENCOUNTER
insulin glargine (BASAGLAR KWIKPEN) 100 UNIT/ML pen      INJECT 80 UNITS UNDER THE SKIN EVERY MORNING  Last Written Prescription Date:    Historical- different dosage    Last Office Visit : 7-19-19  Future Office visit:  none    Routing refill request to provider for review/approval because:  Insulin - refilled per clinic

## 2019-08-02 NOTE — TELEPHONE ENCOUNTER
Long Acting Insulin Protocol Failed8/2 12:17 PM   Blood pressure less than 140/90 in past 6 months     Sent to clinic coordinators for scheduling.   Zunilda Lindo RN on 8/2/2019 at 12:20 PM

## 2019-08-06 ENCOUNTER — PRE VISIT (OUTPATIENT)
Dept: SURGERY | Facility: CLINIC | Age: 54
End: 2019-08-06

## 2019-08-06 NOTE — TELEPHONE ENCOUNTER
FUTURE VISIT INFORMATION      SURGERY INFORMATION:    Anal displacia surgery, DOS: unknown, within 30 days    RECORDS REQUESTED FROM:       Primary Care Provider: Caitie Lamb MD- [facility]    Pertinent Medical History: Pulmonary nodule    Most recent EKG+ Tracin19    Most recent Sleep Study:  10/25/12- Melissa

## 2019-08-07 ENCOUNTER — HOSPITAL ENCOUNTER (EMERGENCY)
Facility: CLINIC | Age: 54
Discharge: HOME | End: 2019-08-07
Attending: EMERGENCY MEDICINE | Admitting: EMERGENCY MEDICINE
Payer: COMMERCIAL

## 2019-08-07 VITALS
RESPIRATION RATE: 14 BRPM | SYSTOLIC BLOOD PRESSURE: 126 MMHG | HEART RATE: 92 BPM | DIASTOLIC BLOOD PRESSURE: 82 MMHG | WEIGHT: 166.1 LBS | OXYGEN SATURATION: 97 % | BODY MASS INDEX: 28.36 KG/M2 | HEIGHT: 64 IN | TEMPERATURE: 97.8 F

## 2019-08-07 DIAGNOSIS — Z21 HIV INFECTION, UNSPECIFIED SYMPTOM STATUS (H): ICD-10-CM

## 2019-08-07 DIAGNOSIS — R42 LIGHTHEADEDNESS: ICD-10-CM

## 2019-08-07 DIAGNOSIS — T73.0XXA HUNGRY, INITIAL ENCOUNTER: ICD-10-CM

## 2019-08-07 DIAGNOSIS — Z59.00 HOMELESSNESS: ICD-10-CM

## 2019-08-07 LAB
ANION GAP SERPL CALCULATED.3IONS-SCNC: 8 MMOL/L (ref 3–14)
BASOPHILS # BLD AUTO: 0 10E9/L (ref 0–0.2)
BASOPHILS NFR BLD AUTO: 0.4 %
BUN SERPL-MCNC: 16 MG/DL (ref 7–30)
CALCIUM SERPL-MCNC: 9.1 MG/DL (ref 8.5–10.1)
CHLORIDE SERPL-SCNC: 105 MMOL/L (ref 94–109)
CO2 SERPL-SCNC: 26 MMOL/L (ref 20–32)
CREAT SERPL-MCNC: 0.8 MG/DL (ref 0.66–1.25)
DIFFERENTIAL METHOD BLD: NORMAL
EOSINOPHIL # BLD AUTO: 0.4 10E9/L (ref 0–0.7)
EOSINOPHIL NFR BLD AUTO: 4.2 %
ERYTHROCYTE [DISTWIDTH] IN BLOOD BY AUTOMATED COUNT: 13.2 % (ref 10–15)
GFR SERPL CREATININE-BSD FRML MDRD: >90 ML/MIN/{1.73_M2}
GLUCOSE BLDC GLUCOMTR-MCNC: 368 MG/DL (ref 70–99)
GLUCOSE BLDC GLUCOMTR-MCNC: 74 MG/DL (ref 70–99)
GLUCOSE SERPL-MCNC: 131 MG/DL (ref 70–99)
HCT VFR BLD AUTO: 45.7 % (ref 40–53)
HGB BLD-MCNC: 14.9 G/DL (ref 13.3–17.7)
IMM GRANULOCYTES # BLD: 0.1 10E9/L (ref 0–0.4)
IMM GRANULOCYTES NFR BLD: 0.9 %
LYMPHOCYTES # BLD AUTO: 2.9 10E9/L (ref 0.8–5.3)
LYMPHOCYTES NFR BLD AUTO: 30.9 %
MCH RBC QN AUTO: 30.5 PG (ref 26.5–33)
MCHC RBC AUTO-ENTMCNC: 32.6 G/DL (ref 31.5–36.5)
MCV RBC AUTO: 94 FL (ref 78–100)
MONOCYTES # BLD AUTO: 0.7 10E9/L (ref 0–1.3)
MONOCYTES NFR BLD AUTO: 7.3 %
NEUTROPHILS # BLD AUTO: 5.3 10E9/L (ref 1.6–8.3)
NEUTROPHILS NFR BLD AUTO: 56.3 %
NRBC # BLD AUTO: 0 10*3/UL
NRBC BLD AUTO-RTO: 0 /100
PLATELET # BLD AUTO: 354 10E9/L (ref 150–450)
POTASSIUM SERPL-SCNC: 3.3 MMOL/L (ref 3.4–5.3)
RBC # BLD AUTO: 4.89 10E12/L (ref 4.4–5.9)
SODIUM SERPL-SCNC: 139 MMOL/L (ref 133–144)
WBC # BLD AUTO: 9.3 10E9/L (ref 4–11)

## 2019-08-07 PROCEDURE — 25000128 H RX IP 250 OP 636: Performed by: EMERGENCY MEDICINE

## 2019-08-07 PROCEDURE — 96360 HYDRATION IV INFUSION INIT: CPT | Performed by: EMERGENCY MEDICINE

## 2019-08-07 PROCEDURE — 99283 EMERGENCY DEPT VISIT LOW MDM: CPT | Mod: 25 | Performed by: EMERGENCY MEDICINE

## 2019-08-07 PROCEDURE — 85025 COMPLETE CBC W/AUTO DIFF WBC: CPT | Performed by: EMERGENCY MEDICINE

## 2019-08-07 PROCEDURE — 99283 EMERGENCY DEPT VISIT LOW MDM: CPT | Mod: Z6 | Performed by: EMERGENCY MEDICINE

## 2019-08-07 PROCEDURE — 99283 EMERGENCY DEPT VISIT LOW MDM: CPT | Performed by: EMERGENCY MEDICINE

## 2019-08-07 PROCEDURE — 80048 BASIC METABOLIC PNL TOTAL CA: CPT | Performed by: EMERGENCY MEDICINE

## 2019-08-07 PROCEDURE — 00000146 ZZHCL STATISTIC GLUCOSE BY METER IP

## 2019-08-07 RX ADMIN — SODIUM CHLORIDE 1000 ML: 9 INJECTION, SOLUTION INTRAVENOUS at 06:48

## 2019-08-07 SDOH — ECONOMIC STABILITY - HOUSING INSECURITY: HOMELESSNESS UNSPECIFIED: Z59.00

## 2019-08-07 ASSESSMENT — ENCOUNTER SYMPTOMS
DIAPHORESIS: 1
FEVER: 0
LIGHT-HEADEDNESS: 1
SHORTNESS OF BREATH: 0
ABDOMINAL PAIN: 0

## 2019-08-07 ASSESSMENT — MIFFLIN-ST. JEOR: SCORE: 1509.42
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
## 2019-08-07 NOTE — DISCHARGE INSTRUCTIONS
Take all of your medications as prescribed. Follow up today as planned and get a new glucose meter. Return with concerns.

## 2019-08-07 NOTE — ED AVS SNAPSHOT
[facility], [place], Emergency Department  [address] [place] [id number]  Phone:  [phone]                                    Andrew Lockwood   MRN: [id number]    Department:  [facility], Emergency Department   Date of Visit:  8/7/2019           After Visit Summary Signature Page    I have received my discharge instructions, and my questions have been answered. I have discussed any challenges I see with this plan with the nurse or doctor.    ..........................................................................................................................................  Patient/Patient Representative Signature      ..........................................................................................................................................  Patient Representative Print Name and Relationship to Patient    ..................................................               ................................................  Date                                   Time    ..........................................................................................................................................  Reviewed by Signature/Title    ...................................................              ..............................................  Date                                               Time          22EPIC Rev 08/18

## 2019-08-07 NOTE — ED NOTES
10:27 AM  Patient was signed out to me is a 51-year-old male who presented with lightheadedness and concerns of low blood sugar pending repeat.  Blood sugar was repeated and was in the 368.  He felt fine and desired to leave and was discharged.     Sohail Ramírez MD  08/07/19 1028

## 2019-08-07 NOTE — ED TRIAGE NOTES
Patient presents ambulatory to triage with c/o hypoglycemia. Patient states he felt blood sugar was low because he was sweaty, dizzy, and legs felt heavy. BG 74 during triage.

## 2019-08-09 ENCOUNTER — HOSPITAL ENCOUNTER (EMERGENCY)
Facility: CLINIC | Age: 54
Discharge: HOME | End: 2019-08-09
Attending: EMERGENCY MEDICINE | Admitting: EMERGENCY MEDICINE
Payer: COMMERCIAL

## 2019-08-09 ENCOUNTER — APPOINTMENT (OUTPATIENT)
Dept: CT IMAGING | Facility: CLINIC | Age: 54
End: 2019-08-09
Attending: EMERGENCY MEDICINE
Payer: COMMERCIAL

## 2019-08-09 ENCOUNTER — APPOINTMENT (OUTPATIENT)
Dept: GENERAL RADIOLOGY | Facility: CLINIC | Age: 54
End: 2019-08-09
Attending: EMERGENCY MEDICINE
Payer: COMMERCIAL

## 2019-08-09 VITALS
SYSTOLIC BLOOD PRESSURE: 139 MMHG | HEIGHT: 64 IN | OXYGEN SATURATION: 99 % | BODY MASS INDEX: 27.31 KG/M2 | WEIGHT: 160 LBS | HEART RATE: 99 BPM | TEMPERATURE: 98.2 F | RESPIRATION RATE: 16 BRPM | DIASTOLIC BLOOD PRESSURE: 81 MMHG

## 2019-08-09 DIAGNOSIS — S00.03XA TRAUMATIC HEMATOMA OF SCALP, INITIAL ENCOUNTER: ICD-10-CM

## 2019-08-09 DIAGNOSIS — R07.89 RIGHT-SIDED CHEST WALL PAIN: ICD-10-CM

## 2019-08-09 DIAGNOSIS — S09.90XA CLOSED HEAD INJURY, INITIAL ENCOUNTER: ICD-10-CM

## 2019-08-09 PROCEDURE — 25000132 ZZH RX MED GY IP 250 OP 250 PS 637: Performed by: EMERGENCY MEDICINE

## 2019-08-09 PROCEDURE — 70450 CT HEAD/BRAIN W/O DYE: CPT

## 2019-08-09 PROCEDURE — 99284 EMERGENCY DEPT VISIT MOD MDM: CPT | Mod: 25 | Performed by: EMERGENCY MEDICINE

## 2019-08-09 PROCEDURE — 71101 X-RAY EXAM UNILAT RIBS/CHEST: CPT | Mod: RT

## 2019-08-09 PROCEDURE — 72125 CT NECK SPINE W/O DYE: CPT

## 2019-08-09 PROCEDURE — 99284 EMERGENCY DEPT VISIT MOD MDM: CPT | Mod: Z6 | Performed by: EMERGENCY MEDICINE

## 2019-08-09 RX ORDER — NAPROXEN 500 MG/1
500 TABLET ORAL 2 TIMES DAILY PRN
Qty: 30 TABLET | Refills: 0 | Status: SHIPPED | OUTPATIENT
Start: 2019-08-09 | End: 2019-12-29

## 2019-08-09 RX ORDER — IBUPROFEN 600 MG/1
600 TABLET, FILM COATED ORAL ONCE
Status: COMPLETED | OUTPATIENT
Start: 2019-08-09 | End: 2019-08-09

## 2019-08-09 RX ADMIN — IBUPROFEN 600 MG: 600 TABLET ORAL at 02:58

## 2019-08-09 ASSESSMENT — MIFFLIN-ST. JEOR: SCORE: 1481.76
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
## 2019-08-09 NOTE — ED AVS SNAPSHOT
[facility], [place], Emergency Department  [address] [place] [id number]  Phone:  [phone]                                    Andrew Lockwood   MRN: [id number]    Department:  [facility], Emergency Department   Date of Visit:  8/9/2019           After Visit Summary Signature Page    I have received my discharge instructions, and my questions have been answered. I have discussed any challenges I see with this plan with the nurse or doctor.    ..........................................................................................................................................  Patient/Patient Representative Signature      ..........................................................................................................................................  Patient Representative Print Name and Relationship to Patient    ..................................................               ................................................  Date                                   Time    ..........................................................................................................................................  Reviewed by Signature/Title    ...................................................              ..............................................  Date                                               Time          22EPIC Rev 08/18

## 2019-08-17 ENCOUNTER — HOSPITAL ENCOUNTER (EMERGENCY)
Facility: CLINIC | Age: 54
Discharge: HOME | End: 2019-08-17
Attending: EMERGENCY MEDICINE | Admitting: EMERGENCY MEDICINE
Payer: COMMERCIAL

## 2019-08-17 VITALS
HEART RATE: 94 BPM | TEMPERATURE: 98.8 F | RESPIRATION RATE: 16 BRPM | OXYGEN SATURATION: 100 % | DIASTOLIC BLOOD PRESSURE: 78 MMHG | SYSTOLIC BLOOD PRESSURE: 125 MMHG

## 2019-08-17 DIAGNOSIS — F41.9 ANXIETY: ICD-10-CM

## 2019-08-17 DIAGNOSIS — R73.9 HYPERGLYCEMIA: ICD-10-CM

## 2019-08-17 DIAGNOSIS — G44.209 TENSION HEADACHE: ICD-10-CM

## 2019-08-17 LAB
GLUCOSE BLDC GLUCOMTR-MCNC: 303 MG/DL (ref 70–99)
GLUCOSE BLDC GLUCOMTR-MCNC: 396 MG/DL (ref 70–99)

## 2019-08-17 PROCEDURE — 25000131 ZZH RX MED GY IP 250 OP 636 PS 637: Performed by: EMERGENCY MEDICINE

## 2019-08-17 PROCEDURE — 00000146 ZZHCL STATISTIC GLUCOSE BY METER IP

## 2019-08-17 PROCEDURE — 25000128 H RX IP 250 OP 636: Performed by: EMERGENCY MEDICINE

## 2019-08-17 PROCEDURE — 99284 EMERGENCY DEPT VISIT MOD MDM: CPT | Mod: Z6 | Performed by: EMERGENCY MEDICINE

## 2019-08-17 PROCEDURE — 96372 THER/PROPH/DIAG INJ SC/IM: CPT | Performed by: EMERGENCY MEDICINE

## 2019-08-17 PROCEDURE — 99284 EMERGENCY DEPT VISIT MOD MDM: CPT | Performed by: EMERGENCY MEDICINE

## 2019-08-17 RX ORDER — KETOROLAC TROMETHAMINE 30 MG/ML
30 INJECTION, SOLUTION INTRAMUSCULAR; INTRAVENOUS ONCE
Status: COMPLETED | OUTPATIENT
Start: 2019-08-17 | End: 2019-08-17

## 2019-08-17 RX ORDER — HYDROXYZINE HYDROCHLORIDE 50 MG/ML
50 INJECTION, SOLUTION INTRAMUSCULAR ONCE
Status: COMPLETED | OUTPATIENT
Start: 2019-08-17 | End: 2019-08-17

## 2019-08-17 RX ADMIN — INSULIN ASPART 5 UNITS: 100 INJECTION, SOLUTION INTRAVENOUS; SUBCUTANEOUS at 12:03

## 2019-08-17 RX ADMIN — KETOROLAC TROMETHAMINE 30 MG: 30 INJECTION, SOLUTION INTRAMUSCULAR at 11:18

## 2019-08-17 RX ADMIN — INSULIN ASPART 10 UNITS: 100 INJECTION, SOLUTION INTRAVENOUS; SUBCUTANEOUS at 13:21

## 2019-08-17 RX ADMIN — HYDROXYZINE HYDROCHLORIDE 50 MG: 50 INJECTION, SOLUTION INTRAMUSCULAR at 11:18

## 2019-08-17 RX ADMIN — INSULIN GLARGINE 40 UNITS: 100 INJECTION, SOLUTION SUBCUTANEOUS at 12:03

## 2019-08-17 ASSESSMENT — ENCOUNTER SYMPTOMS
FEVER: 0
COLOR CHANGE: 0
HEADACHES: 1
DIFFICULTY URINATING: 0
CONFUSION: 0
ARTHRALGIAS: 0
NERVOUS/ANXIOUS: 1
NECK STIFFNESS: 0
ABDOMINAL PAIN: 0
EYE REDNESS: 0
SHORTNESS OF BREATH: 0
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
## 2019-08-17 NOTE — ED AVS SNAPSHOT
[facility], [place], Emergency Department  [address] [place] [id number]  Phone:  [phone]                                    Andrew Lockwood   MRN: [id number]    Department:  [facility], Emergency Department   Date of Visit:  8/17/2019           After Visit Summary Signature Page    I have received my discharge instructions, and my questions have been answered. I have discussed any challenges I see with this plan with the nurse or doctor.    ..........................................................................................................................................  Patient/Patient Representative Signature      ..........................................................................................................................................  Patient Representative Print Name and Relationship to Patient    ..................................................               ................................................  Date                                   Time    ..........................................................................................................................................  Reviewed by Signature/Title    ...................................................              ..............................................  Date                                               Time          22EPIC Rev 08/18

## 2019-08-17 NOTE — DISCHARGE INSTRUCTIONS
You were given your morning dose of long-acting insulin as well as a dose of short acting insulin.  Go to the pharmacy to  your prescriptions.  Take your medications as directed.  Follow-up with your primary care provider.
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
## 2019-08-18 ENCOUNTER — HOSPITAL ENCOUNTER (EMERGENCY)
Facility: CLINIC | Age: 54
Discharge: HOME | End: 2019-08-18
Attending: EMERGENCY MEDICINE | Admitting: EMERGENCY MEDICINE
Payer: COMMERCIAL

## 2019-08-18 VITALS
WEIGHT: 170 LBS | BODY MASS INDEX: 29.18 KG/M2 | SYSTOLIC BLOOD PRESSURE: 128 MMHG | OXYGEN SATURATION: 99 % | HEART RATE: 90 BPM | TEMPERATURE: 98.1 F | DIASTOLIC BLOOD PRESSURE: 80 MMHG

## 2019-08-18 DIAGNOSIS — L03.012 PARONYCHIA OF FINGER OF LEFT HAND: Primary | ICD-10-CM

## 2019-08-18 DIAGNOSIS — R10.13 ABDOMINAL PAIN, EPIGASTRIC: ICD-10-CM

## 2019-08-18 DIAGNOSIS — G44.209 TENSION HEADACHE: ICD-10-CM

## 2019-08-18 LAB
ANION GAP SERPL CALCULATED.3IONS-SCNC: 8 MMOL/L (ref 3–14)
BASOPHILS # BLD AUTO: 0.1 10E9/L (ref 0–0.2)
BASOPHILS NFR BLD AUTO: 0.5 %
BUN SERPL-MCNC: 9 MG/DL (ref 7–30)
CALCIUM SERPL-MCNC: 8.6 MG/DL (ref 8.5–10.1)
CHLORIDE SERPL-SCNC: 103 MMOL/L (ref 94–109)
CO2 SERPL-SCNC: 23 MMOL/L (ref 20–32)
CREAT SERPL-MCNC: 0.67 MG/DL (ref 0.66–1.25)
DIFFERENTIAL METHOD BLD: NORMAL
EOSINOPHIL # BLD AUTO: 0.3 10E9/L (ref 0–0.7)
EOSINOPHIL NFR BLD AUTO: 3.2 %
ERYTHROCYTE [DISTWIDTH] IN BLOOD BY AUTOMATED COUNT: 12.6 % (ref 10–15)
ETHANOL SERPL-MCNC: <0.01 G/DL
GFR SERPL CREATININE-BSD FRML MDRD: >90 ML/MIN/{1.73_M2}
GLUCOSE BLDC GLUCOMTR-MCNC: 300 MG/DL (ref 70–99)
GLUCOSE SERPL-MCNC: 316 MG/DL (ref 70–99)
HCT VFR BLD AUTO: 43.1 % (ref 40–53)
HGB BLD-MCNC: 14.3 G/DL (ref 13.3–17.7)
IMM GRANULOCYTES # BLD: 0.1 10E9/L (ref 0–0.4)
IMM GRANULOCYTES NFR BLD: 0.8 %
LYMPHOCYTES # BLD AUTO: 2.1 10E9/L (ref 0.8–5.3)
LYMPHOCYTES NFR BLD AUTO: 22.1 %
MCH RBC QN AUTO: 31 PG (ref 26.5–33)
MCHC RBC AUTO-ENTMCNC: 33.2 G/DL (ref 31.5–36.5)
MCV RBC AUTO: 93 FL (ref 78–100)
MONOCYTES # BLD AUTO: 0.6 10E9/L (ref 0–1.3)
MONOCYTES NFR BLD AUTO: 6 %
NEUTROPHILS # BLD AUTO: 6.4 10E9/L (ref 1.6–8.3)
NEUTROPHILS NFR BLD AUTO: 67.4 %
NRBC # BLD AUTO: 0 10*3/UL
NRBC BLD AUTO-RTO: 0 /100
PLATELET # BLD AUTO: 371 10E9/L (ref 150–450)
POTASSIUM SERPL-SCNC: 3.5 MMOL/L (ref 3.4–5.3)
RBC # BLD AUTO: 4.62 10E12/L (ref 4.4–5.9)
SODIUM SERPL-SCNC: 134 MMOL/L (ref 133–144)
TROPONIN I SERPL-MCNC: <0.015 UG/L (ref 0–0.04)
WBC # BLD AUTO: 9.6 10E9/L (ref 4–11)

## 2019-08-18 PROCEDURE — 96372 THER/PROPH/DIAG INJ SC/IM: CPT | Mod: 59

## 2019-08-18 PROCEDURE — 25000125 ZZHC RX 250

## 2019-08-18 PROCEDURE — 10060 I&D ABSCESS SIMPLE/SINGLE: CPT | Mod: Z6 | Performed by: EMERGENCY MEDICINE

## 2019-08-18 PROCEDURE — 25000125 ZZHC RX 250: Performed by: EMERGENCY MEDICINE

## 2019-08-18 PROCEDURE — 96360 HYDRATION IV INFUSION INIT: CPT

## 2019-08-18 PROCEDURE — 93005 ELECTROCARDIOGRAM TRACING: CPT | Mod: 59

## 2019-08-18 PROCEDURE — 99284 EMERGENCY DEPT VISIT MOD MDM: CPT | Mod: 25 | Performed by: EMERGENCY MEDICINE

## 2019-08-18 PROCEDURE — 00000146 ZZHCL STATISTIC GLUCOSE BY METER IP

## 2019-08-18 PROCEDURE — 96361 HYDRATE IV INFUSION ADD-ON: CPT

## 2019-08-18 PROCEDURE — 99284 EMERGENCY DEPT VISIT MOD MDM: CPT | Mod: 25

## 2019-08-18 PROCEDURE — 80320 DRUG SCREEN QUANTALCOHOLS: CPT | Performed by: EMERGENCY MEDICINE

## 2019-08-18 PROCEDURE — 84484 ASSAY OF TROPONIN QUANT: CPT | Performed by: EMERGENCY MEDICINE

## 2019-08-18 PROCEDURE — 10060 I&D ABSCESS SIMPLE/SINGLE: CPT

## 2019-08-18 PROCEDURE — 80048 BASIC METABOLIC PNL TOTAL CA: CPT | Performed by: EMERGENCY MEDICINE

## 2019-08-18 PROCEDURE — 25000132 ZZH RX MED GY IP 250 OP 250 PS 637: Performed by: EMERGENCY MEDICINE

## 2019-08-18 PROCEDURE — 85025 COMPLETE CBC W/AUTO DIFF WBC: CPT | Performed by: EMERGENCY MEDICINE

## 2019-08-18 PROCEDURE — 25000128 H RX IP 250 OP 636: Performed by: EMERGENCY MEDICINE

## 2019-08-18 RX ORDER — CEPHALEXIN 500 MG/1
500 CAPSULE ORAL ONCE
Status: COMPLETED | OUTPATIENT
Start: 2019-08-18 | End: 2019-08-18

## 2019-08-18 RX ORDER — LIDOCAINE HYDROCHLORIDE 10 MG/ML
INJECTION, SOLUTION EPIDURAL; INFILTRATION; INTRACAUDAL; PERINEURAL
Status: COMPLETED
Start: 2019-08-18 | End: 2019-08-18

## 2019-08-18 RX ORDER — CEPHALEXIN 500 MG/1
500 CAPSULE ORAL 4 TIMES DAILY
Qty: 40 CAPSULE | Refills: 0 | Status: SHIPPED | OUTPATIENT
Start: 2019-08-18 | End: 2019-08-22

## 2019-08-18 RX ORDER — LIDOCAINE HYDROCHLORIDE 10 MG/ML
10 INJECTION, SOLUTION EPIDURAL; INFILTRATION; INTRACAUDAL; PERINEURAL ONCE
Status: COMPLETED | OUTPATIENT
Start: 2019-08-18 | End: 2019-08-18

## 2019-08-18 RX ORDER — SODIUM CHLORIDE 9 MG/ML
1000 INJECTION, SOLUTION INTRAVENOUS CONTINUOUS
Status: DISCONTINUED | OUTPATIENT
Start: 2019-08-18 | End: 2019-08-18 | Stop reason: HOSPADM

## 2019-08-18 RX ORDER — KETOROLAC TROMETHAMINE 30 MG/ML
30 INJECTION, SOLUTION INTRAMUSCULAR; INTRAVENOUS ONCE
Status: COMPLETED | OUTPATIENT
Start: 2019-08-18 | End: 2019-08-18

## 2019-08-18 RX ADMIN — LIDOCAINE HYDROCHLORIDE 10 ML: 10 INJECTION, SOLUTION EPIDURAL; INFILTRATION; INTRACAUDAL; PERINEURAL at 18:02

## 2019-08-18 RX ADMIN — KETOROLAC TROMETHAMINE 30 MG: 30 INJECTION, SOLUTION INTRAMUSCULAR at 18:02

## 2019-08-18 RX ADMIN — LIDOCAINE HYDROCHLORIDE 30 ML: 20 SOLUTION ORAL; TOPICAL at 18:13

## 2019-08-18 RX ADMIN — CEPHALEXIN 500 MG: 500 CAPSULE ORAL at 18:12

## 2019-08-18 RX ADMIN — SODIUM CHLORIDE 1000 ML: 9 INJECTION, SOLUTION INTRAVENOUS at 18:21

## 2019-08-18 NOTE — ED PROVIDER NOTES
[facility] ([facility])  August 18, 2019    History     Chief Complaint   Patient presents with     Headache     The history is provided by the patient and medical records.     Andrew Lockwood is a 51 year old male with PMHx of DMII, and HIV who presents to the Emergency Department today for evaluation of pain. Patient endorses right arm and right shoulder pain, left ring finger pain, headache and painful furuncle on his buttocks. Patient denies alcohol or illicit drug use.     I have reviewed the Medications, Allergies, Past Medical and Surgical History, and Social History in the [employer] system.    Past Medical History:   Diagnosis Date     AIDS (H)      Allergic rhinitis due to other allergen     DNS     Chronic abdominal pain      CNS toxoplasmosis (H)      Diabetes type 2, controlled (H)      GERD (gastroesophageal reflux disease)      HIV (human immunodeficiency virus infection) (H)      Periungual wart      Sleep apnea     doesn't use CPAP       Past Surgical History:   Procedure Laterality Date     C NONSPECIFIC PROCEDURE      right forearm fracture     COLONOSCOPY Left 1/22/2016    Procedure: COMBINED COLONOSCOPY, SINGLE OR MULTIPLE BIOPSY/POLYPECTOMY BY BIOPSY;  Surgeon: Clark Saini MD;  Location: [facility]     HC EXPLORE UNDESC TESTIS,INGUIN/SCROTAL       LAPAROSCOPIC APPENDECTOMY N/A 1/31/2018    Procedure: LAPAROSCOPIC APPENDECTOMY;  LAPAROSCOPIC APPENDECTOMY;  Surgeon: Dawn Holt MD;  Location: [facility] OR     LAPAROSCOPY DIAGNOSTIC (GENERAL) N/A 7/26/2016    Procedure: LAPAROSCOPY DIAGNOSTIC (GENERAL);  Surgeon: Susannah Arriaga MD;  Location: [facility]     LAPAROSCOPY DIAGNOSTIC (GENERAL) N/A 4/16/2018    Procedure: LAPAROSCOPY DIAGNOSTIC (GENERAL);  Diagnostic laparoscopy and lysis of adhesions;  Surgeon: Prince Dowling MD;  Location: [facility] OR     OPTICAL TRACKING SYSTEM CRANIOTOMY, EXCISE TUMOR, COMBINED Left 4/10/2015    Procedure: COMBINED OPTICAL  TRACKING SYSTEM CRANIOTOMY, EXCISE TUMOR;  Surgeon: Mirlande Colmenares MD;  Location: [facility] OR     REPAIR GAMEKEEPER'S THUMB Right 2016    Procedure: REPAIR LIGAMENT ULNAR COLLATERAL THUMB (GAMEKEEPER'S);  Surgeon: Evin Zamorano MD;  [facility]       Family History   Problem Relation Age of Onset     Diabetes Brother      Diabetes Father      Alzheimer Disease Father      Unknown/Adopted Mother      Diabetes Paternal Grandfather      Cancer No family hx of         no skin cancer     Skin Cancer No family hx of         no famiy hx of skin cancer     Glaucoma No family hx of      Macular Degeneration No family hx of        Social History     Tobacco Use     Smoking status: Current Some Day Smoker     Packs/day: 0.25     Types: Cigarettes     Last attempt to quit: 10/28/2016     Years since quittin.8     Smokeless tobacco: Former User   Substance Use Topics     Alcohol use: No     Alcohol/week: 0.0 oz     Comment: Last etoh in        Current Facility-Administered Medications   Medication     lidocaine (PF) (XYLOCAINE) 1 % injection 4 mL     sodium chloride 0.9% infusion     triamcinolone (KENALOG-40) injection 40 mg     Current Outpatient Medications   Medication     cephALEXin (KEFLEX) 500 MG capsule     bictegravir-emtricitabine-tenofovir (BIKTARVY) -25 MG per tablet     blood glucose (NO BRAND SPECIFIED) lancets standard     blood glucose (NO BRAND SPECIFIED) test strip     blood glucose monitoring (NO BRAND SPECIFIED) meter device kit     cephALEXin (KEFLEX) 500 MG capsule     HUMALOG KWIKPEN 100 UNIT/ML soln     insulin glargine (BASAGLAR KWIKPEN) 100 UNIT/ML pen     insulin glargine (BASAGLAR KWIKPEN) 100 UNIT/ML pen     insulin pen needle (B-D U/F) 31G X 8 MM miscellaneous     insulin pen needle (BD PEN NEEDLE SCOTT 2ND GEN) 32G X 4 MM miscellaneous     naproxen (NAPROSYN) 500 MG tablet     NOVOLOG FLEXPEN 100 UNIT/ML soln     omeprazole (PRILOSEC) 20 MG CR capsule     ranitidine  (ZANTAC) 150 MG tablet        Allergies   Allergen Reactions     Metformin      Abdominal pain     Milk [Lac Bovis] Hives     Tylenol [Acetaminophen] Itching     Dulaglutide Rash       Review of Systems   Musculoskeletal:        Positive for right arm/shoulder pain  Positive for left ring finger pain   Skin:        Positive for furuncle on buttocks    All other systems reviewed and are negative.      Physical Exam   BP: 120/83  Pulse: 118  Temp: 98.1  F (36.7  C)  Weight: 77.1 kg (170 lb)  SpO2: 99 %      Physical Exam   Constitutional: No distress.   HENT:   Head: Atraumatic.   Mouth/Throat: Oropharynx is clear and moist. No oropharyngeal exudate.   Eyes: Pupils are equal, round, and reactive to light. No scleral icterus.   Cardiovascular: Normal heart sounds.   Pulmonary/Chest: Breath sounds normal. No respiratory distress.   Abdominal: Soft. He exhibits no distension. There is no tenderness.   Musculoskeletal: He exhibits no edema or tenderness.   Neurological: He is alert.   Skin: Skin is warm. He is not diaphoretic.   Small furuncle on left buttock left fourth digit paronychia radial aspect   Psychiatric: He has a normal mood and affect. His behavior is normal.       ED Course   5:39 PM  The patient was seen and examined by Shaw Stewart DO, in [facility].      [facility], [place]    PROCEDURE: Incision and drainage  Date/Time: 8/18/2019 8:48 PM  Performed by: Shaw Stewart DO  Authorized by: Shaw Stewart DO     UNIVERSAL PROTOCOL   Site Marked: No  Prior Images Obtained and Reviewed:  No  Required items: Required blood products, implants, devices and special equipment available    Patient identity confirmed:  Verbally with patient  NA - No sedation, light sedation, or local anesthesia  Confirmation Checklist:  Patient's identity using two indicators, correct equipment/implants were available and relevant allergies  Time out: Immediately prior to the procedure  a time out was called    Preparation: Patient was prepped and draped in usual sterile fashion      LOCATION:      Type:  Abscess    Size:  Paronychia on left fourth finger    PRE-PROCEDURE DETAILS:     Skin preparation:  Antiseptic wash    ANESTHESIA (see MAR for exact dosages):     Anesthesia method:  None    PROCEDURE TYPE:     Complexity:  Simple    SEDATION    Patient Sedated: No      PROCEDURE DETAILS:     Needle aspiration: no      Incision types:  Stab incision    Incision depth:  Dermal    Scalpel blade:  11    Wound management:  Probed and deloculated    Drainage:  Purulent    Drainage amount:  Scant    Wound treatment:  Wound left open    Packing materials:  None  Post-procedure details:     PROCEDURE   Patient Tolerance:  Patient tolerated the procedure well with no immediate complications    Time of Sedation in Minutes by Physician:  0      Labs Ordered and Resulted from Time of ED Arrival Up to the Time of Departure from the ED   GLUCOSE BY METER - Abnormal; Notable for the following components:       Result Value    Glucose 300 (*)     All other components within normal limits   BASIC METABOLIC PANEL - Abnormal; Notable for the following components:    Glucose 316 (*)     All other components within normal limits   GLUCOSE MONITOR NURSING POCT   CBC WITH PLATELETS DIFFERENTIAL   TROPONIN I   ALCOHOL ETHYL           Results for orders placed or performed during the hospital encounter of 08/18/19 (from the past 24 hour(s))   Glucose by meter   Result Value Ref Range    Glucose 300 (H) 70 - 99 mg/dL   Basic metabolic panel   Result Value Ref Range    Sodium 134 133 - 144 mmol/L    Potassium 3.5 3.4 - 5.3 mmol/L    Chloride 103 94 - 109 mmol/L    Carbon Dioxide 23 20 - 32 mmol/L    Anion Gap 8 3 - 14 mmol/L    Glucose 316 (H) 70 - 99 mg/dL    Urea Nitrogen 9 7 - 30 mg/dL    Creatinine 0.67 0.66 - 1.25 mg/dL    GFR Estimate >90 >60 mL/min/[1.73_m2]    GFR Estimate If Black >90 >60 mL/min/[1.73_m2]     Calcium 8.6 8.5 - 10.1 mg/dL   CBC with platelets differential   Result Value Ref Range    WBC 9.6 4.0 - 11.0 10e9/L    RBC Count 4.62 4.4 - 5.9 10e12/L    Hemoglobin 14.3 13.3 - 17.7 g/dL    Hematocrit 43.1 40.0 - 53.0 %    MCV 93 78 - 100 fl    MCH 31.0 26.5 - 33.0 pg    MCHC 33.2 31.5 - 36.5 g/dL    RDW 12.6 10.0 - 15.0 %    Platelet Count 371 150 - 450 10e9/L    Diff Method Automated Method     % Neutrophils 67.4 %    % Lymphocytes 22.1 %    % Monocytes 6.0 %    % Eosinophils 3.2 %    % Basophils 0.5 %    % Immature Granulocytes 0.8 %    Nucleated RBCs 0 0 /100    Absolute Neutrophil 6.4 1.6 - 8.3 10e9/L    Absolute Lymphocytes 2.1 0.8 - 5.3 10e9/L    Absolute Monocytes 0.6 0.0 - 1.3 10e9/L    Absolute Eosinophils 0.3 0.0 - 0.7 10e9/L    Absolute Basophils 0.1 0.0 - 0.2 10e9/L    Abs Immature Granulocytes 0.1 0 - 0.4 10e9/L    Absolute Nucleated RBC 0.0    Troponin I   Result Value Ref Range    Troponin I ES <0.015 0.000 - 0.045 ug/L   Alcohol ethyl   Result Value Ref Range    Ethanol g/dL <0.01 <0.01 g/dL   EKG 12-lead, tracing only   Result Value Ref Range    Interpretation ECG Click View Image link to view waveform and result      *Note: Due to a large number of results and/or encounters for the requested time period, some results have not been displayed. A complete set of results can be found in Results Review.     Results for orders placed or performed during the hospital encounter of 08/18/19   Glucose by meter   Result Value Ref Range    Glucose 300 (H) 70 - 99 mg/dL   Basic metabolic panel   Result Value Ref Range    Sodium 134 133 - 144 mmol/L    Potassium 3.5 3.4 - 5.3 mmol/L    Chloride 103 94 - 109 mmol/L    Carbon Dioxide 23 20 - 32 mmol/L    Anion Gap 8 3 - 14 mmol/L    Glucose 316 (H) 70 - 99 mg/dL    Urea Nitrogen 9 7 - 30 mg/dL    Creatinine 0.67 0.66 - 1.25 mg/dL    GFR Estimate >90 >60 mL/min/[1.73_m2]    GFR Estimate If Black >90 >60 mL/min/[1.73_m2]    Calcium 8.6 8.5 - 10.1 mg/dL   CBC with  platelets differential   Result Value Ref Range    WBC 9.6 4.0 - 11.0 10e9/L    RBC Count 4.62 4.4 - 5.9 10e12/L    Hemoglobin 14.3 13.3 - 17.7 g/dL    Hematocrit 43.1 40.0 - 53.0 %    MCV 93 78 - 100 fl    MCH 31.0 26.5 - 33.0 pg    MCHC 33.2 31.5 - 36.5 g/dL    RDW 12.6 10.0 - 15.0 %    Platelet Count 371 150 - 450 10e9/L    Diff Method Automated Method     % Neutrophils 67.4 %    % Lymphocytes 22.1 %    % Monocytes 6.0 %    % Eosinophils 3.2 %    % Basophils 0.5 %    % Immature Granulocytes 0.8 %    Nucleated RBCs 0 0 /100    Absolute Neutrophil 6.4 1.6 - 8.3 10e9/L    Absolute Lymphocytes 2.1 0.8 - 5.3 10e9/L    Absolute Monocytes 0.6 0.0 - 1.3 10e9/L    Absolute Eosinophils 0.3 0.0 - 0.7 10e9/L    Absolute Basophils 0.1 0.0 - 0.2 10e9/L    Abs Immature Granulocytes 0.1 0 - 0.4 10e9/L    Absolute Nucleated RBC 0.0    Troponin I   Result Value Ref Range    Troponin I ES <0.015 0.000 - 0.045 ug/L   Alcohol ethyl   Result Value Ref Range    Ethanol g/dL <0.01 <0.01 g/dL   EKG 12-lead, tracing only   Result Value Ref Range    Interpretation ECG Click View Image link to view waveform and result      *Note: Due to a large number of results and/or encounters for the requested time period, some results have not been displayed. A complete set of results can be found in Results Review.         Assessments & Plan (with Medical Decision Making)   51-year-old male presents to her pain, boil on his buttock.  He also notes epigastric burning.  Different includes but not limited to acute coronary syndrome, paronychia, gastritis, gastritis, abscess.  Patient's finger is consistent with a paronychia.  Digital block was performed and this was drained with an 11 blade.  Patient will be sent home with Bactrim.  Patient's buttock had a superficial furuncle.  There was not a larger abscess that required drainage.  Patient was tachycardic and complained of epigastric burning.  He was given a GI cocktail which resolved the symptoms.   Troponin as well as EKG were performed which are normal.  Patient was also given IV fluids which improved his heart rate from initial 120 to 90.  Patient is feeling much better at this point and is comfortable with discharge home.  He was also given Toradol for his various pains which did not improve his symptoms.    I have reviewed the nursing notes.    I have reviewed the findings, diagnosis, plan and need for follow up with the patient.    Discharge Medication List as of 8/18/2019  7:54 PM      START taking these medications    Details   !! cephALEXin (KEFLEX) 500 MG capsule Take 1 capsule (500 mg) by mouth 4 times daily for 10 days, Disp-40 capsule, R-0, Local Print       !! - Potential duplicate medications found. Please discuss with provider.          Final diagnoses:   Tension headache   Paronychia of finger of left hand   Abdominal pain  I, Ryan Silverio, am serving as a trained medical scribe to document services personally performed by Shaw Stewart DO, based on the provider's statements to me.      I, Shaw Stewart DO, was physically present and have reviewed and verified the accuracy of this note documented by Ryan Silverio.     8/18/2019   [facility], [place], EMERGENCY DEPARTMENT     Shaw Stewart DO  08/18/19 2051

## 2019-08-18 NOTE — ED AVS SNAPSHOT
[facility], [place], Emergency Department  [address] [place] [address]  Phone:  [phone]                                    Andrew Lockwood   MRN: [id number]    Department:  [facility], Emergency Department   Date of Visit:  8/18/2019           After Visit Summary Signature Page    I have received my discharge instructions, and my questions have been answered. I have discussed any challenges I see with this plan with the nurse or doctor.    ..........................................................................................................................................  Patient/Patient Representative Signature      ..........................................................................................................................................  Patient Representative Print Name and Relationship to Patient    ..................................................               ................................................  Date                                   Time    ..........................................................................................................................................  Reviewed by Signature/Title    ...................................................              ..............................................  Date                                               Time          22EPIC Rev 08/18

## 2019-08-19 LAB — INTERPRETATION ECG - MUSE: NORMAL

## 2019-08-21 ENCOUNTER — HOSPITAL ENCOUNTER (EMERGENCY)
Facility: CLINIC | Age: 54
Discharge: HOME | End: 2019-08-21
Admitting: EMERGENCY MEDICINE
Payer: COMMERCIAL

## 2019-08-21 ENCOUNTER — HOSPITAL ENCOUNTER (OUTPATIENT)
Facility: AMBULATORY SURGERY CENTER | Age: 54
End: 2019-08-21
Attending: COLON & RECTAL SURGERY
Payer: COMMERCIAL

## 2019-08-21 ENCOUNTER — DOCUMENTATION ONLY (OUTPATIENT)
Dept: SURGERY | Facility: CLINIC | Age: 54
End: 2019-08-21

## 2019-08-21 VITALS
SYSTOLIC BLOOD PRESSURE: 135 MMHG | RESPIRATION RATE: 18 BRPM | OXYGEN SATURATION: 98 % | BODY MASS INDEX: 29.18 KG/M2 | DIASTOLIC BLOOD PRESSURE: 84 MMHG | WEIGHT: 170 LBS | TEMPERATURE: 98.1 F

## 2019-08-21 DIAGNOSIS — K21.00 GASTROESOPHAGEAL REFLUX DISEASE WITH ESOPHAGITIS: ICD-10-CM

## 2019-08-21 LAB
GLUCOSE BLDC GLUCOMTR-MCNC: 192 MG/DL (ref 70–99)
INTERPRETATION ECG - MUSE: NORMAL

## 2019-08-21 PROCEDURE — 00000146 ZZHCL STATISTIC GLUCOSE BY METER IP

## 2019-08-21 PROCEDURE — 93005 ELECTROCARDIOGRAM TRACING: CPT

## 2019-08-21 PROCEDURE — 99283 EMERGENCY DEPT VISIT LOW MDM: CPT

## 2019-08-21 PROCEDURE — 99283 EMERGENCY DEPT VISIT LOW MDM: CPT | Mod: Z6 | Performed by: EMERGENCY MEDICINE

## 2019-08-21 NOTE — PROGRESS NOTES
Patient is scheduled for surgery with Dr. Lundberg    Spoke with Andrew in-person in clinic    Date of Surgery: 10/11/2019 at 1:15 pm    Location: [facility]    Informed patient they will need an adult  YES    H&P: Scheduled with PAC on 10/4/2019 at 10:00 am    Surgery packet: will provide to DAYNA Montes to give to patient     Additional comments:  Spoke with patient in clinic. Patient states his SW, Simon, told him to go ahead and schedule his pre-op and surgery, and he'll find somewhere for patient to stay and someone to stay with him for 24 hours after his surgery. I told patient that he needs to have this and also make sure to have his pre-op as scheduled or his surgery will have to be cancelled. Scheduled patient's pre-op with PAC and surgery at the [facility]. I told patient I'd email him as soon as I hear back from DAYNA Montes. Patient expressed understanding of the plan. I did send in-basket message to DAYNA Montes asking him to confirm the above info. Awaiting response.

## 2019-08-22 ENCOUNTER — HOSPITAL ENCOUNTER (EMERGENCY)
Facility: CLINIC | Age: 54
Discharge: HOME | End: 2019-08-22
Attending: EMERGENCY MEDICINE | Admitting: EMERGENCY MEDICINE
Payer: COMMERCIAL

## 2019-08-22 ENCOUNTER — PRE VISIT (OUTPATIENT)
Dept: SURGERY | Facility: CLINIC | Age: 54
End: 2019-08-22

## 2019-08-22 VITALS
DIASTOLIC BLOOD PRESSURE: 96 MMHG | BODY MASS INDEX: 28.98 KG/M2 | SYSTOLIC BLOOD PRESSURE: 131 MMHG | RESPIRATION RATE: 16 BRPM | WEIGHT: 169.75 LBS | OXYGEN SATURATION: 100 % | HEART RATE: 98 BPM | HEIGHT: 64 IN | TEMPERATURE: 97.9 F

## 2019-08-22 DIAGNOSIS — L03.012 PARONYCHIA, FINGER, LEFT: ICD-10-CM

## 2019-08-22 PROCEDURE — 26010 DRAINAGE OF FINGER ABSCESS: CPT | Mod: F1 | Performed by: EMERGENCY MEDICINE

## 2019-08-22 PROCEDURE — 99283 EMERGENCY DEPT VISIT LOW MDM: CPT | Mod: 25 | Performed by: EMERGENCY MEDICINE

## 2019-08-22 PROCEDURE — 99284 EMERGENCY DEPT VISIT MOD MDM: CPT | Mod: 25 | Performed by: EMERGENCY MEDICINE

## 2019-08-22 RX ORDER — LIDOCAINE HYDROCHLORIDE 10 MG/ML
INJECTION, SOLUTION EPIDURAL; INFILTRATION; INTRACAUDAL; PERINEURAL
Status: DISCONTINUED
Start: 2019-08-22 | End: 2019-08-22 | Stop reason: HOSPADM

## 2019-08-22 RX ORDER — SULFAMETHOXAZOLE/TRIMETHOPRIM 800-160 MG
1 TABLET ORAL 2 TIMES DAILY
Qty: 14 TABLET | Refills: 0 | Status: ON HOLD | OUTPATIENT
Start: 2019-08-22 | End: 2019-11-12

## 2019-08-22 ASSESSMENT — ENCOUNTER SYMPTOMS
SORE THROAT: 0
JOINT SWELLING: 1
SHORTNESS OF BREATH: 0
HEADACHES: 0
NECK PAIN: 0
ABDOMINAL PAIN: 0
DIARRHEA: 0
CHILLS: 0
NECK STIFFNESS: 0
VOMITING: 0
FEVER: 0
PALPITATIONS: 0
NAUSEA: 0

## 2019-08-22 ASSESSMENT — MIFFLIN-ST. JEOR: SCORE: 1526

## 2019-08-22 NOTE — TELEPHONE ENCOUNTER
FUTURE VISIT INFORMATION      SURGERY INFORMATION:    Date: 10/11/19    [facility]    Surgeon:  Thanh Lundberg    Anesthesia Type:  MAC    RECORDS REQUESTED FROM:       Primary Care Provider: Caitie Lamb MD-[facility]    Pertinent Medical History: Pulmonary nodule    Most recent EKG+ Tracin19    Most recent Sleep Study:  10/25/12

## 2019-08-22 NOTE — ED PROVIDER NOTES
"  History     Chief Complaint   Patient presents with     Hand Pain     HPI  Andrew Locwkood is a 51 year old male who has a plethora history of HIV, type 2 diabetes presenting with pain in his left ring finger.  He had a paronychia drained a few days ago however says that the swelling is worse.  Denies fevers, chills.  He thinks he has been taking his antibiotic that he was given.  This has not been draining.  He has not done warm compresses.  Denies any other discomfort.  Breath or fevers.    I have reviewed the Medications, Allergies, Past Medical and Surgical History, and Social History in the Epic system.    Review of Systems   Constitutional: Negative for chills and fever.   HENT: Negative for sore throat.    Respiratory: Negative for shortness of breath.    Cardiovascular: Negative for chest pain and palpitations.   Gastrointestinal: Negative for abdominal pain, diarrhea, nausea and vomiting.   Musculoskeletal: Positive for joint swelling (finger pain). Negative for neck pain and neck stiffness.   Neurological: Negative for headaches.   All other systems reviewed and are negative.      Physical Exam   BP: 131/81  Pulse: 99  Temp: 97.9  F (36.6  C)  Resp: 16  Height: 162.6 cm (5' 4\")  Weight: 77 kg (169 lb 12.1 oz)  SpO2: 99 %      Physical Exam  Physical Exam   Constitutional: oriented to person, place, and time. appears well-developed and well-nourished.   HENT:   Head: Normocephalic and atraumatic.   Neck: Normal range of motion.   Pulmonary/Chest: Effort normal. No respiratory distress.   Cardiac: No murmurs, rubs, gallops. RRR.  Abdominal: Abdomen soft, nontender, nondistended. No rebound tenderness.  MSK: Left ring finger with erythema and swelling to the skin just proximal to the nail fold.  The remainder the fingers unremarkable.  Neurovascular exam is unremarkable of the left ring finger.  Range of motion of all tendons are intact.  Neurological: alert and oriented to person, place, and time.   Skin: " Skin is warm and dry.   Psychiatric:  normal mood and affect.  behavior is normal. Thought content normal.     ED Course        [facility], [place]    PROCEDURE: Incision and drainage  Date/Time: 8/22/2019 6:16 AM  Performed by: Venu Wolff MD  Authorized by: Venu Wolff MD     UNIVERSAL PROTOCOL   Site Marked: NA  Prior Images Obtained and Reviewed:  NA  Required items: Required blood products, implants, devices and special equipment available    Patient identity confirmed:  Verbally with patient  NA - No sedation, light sedation, or local anesthesia  Confirmation Checklist:  Patient's identity using two indicators and relevant allergies  Time out: Immediately prior to the procedure a time out was called    Preparation: Patient was prepped and draped in usual sterile fashion      LOCATION:      Type:  Abscess    Size:  0.5cm    Location:  Upper extremity    Upper extremity location:  Finger    Finger location:  L ring finger    PRE-PROCEDURE DETAILS:     Skin preparation:  Chloraprep    ANESTHESIA (see MAR for exact dosages):     Anesthesia method:  Local infiltration    Local anesthetic:  Lidocaine 1% w/o epi    PROCEDURE TYPE:     Complexity:  Simple    SEDATION    Patient Sedated: No      PROCEDURE DETAILS:     Needle aspiration: no      Incision types:  Stab incision    Scalpel blade:  11    Wound management:  Probed and deloculated    Drainage:  Purulent    Drainage amount:  Moderate    Wound treatment:  Wound left open    Packing materials:  None  Post-procedure details:     PROCEDURE   Patient Tolerance:  Patient tolerated the procedure well with no immediate complications    Time of Sedation in Minutes by Physician:  0      Labs Ordered and Resulted from Time of ED Arrival Up to the Time of Departure from the ED - No data to display         Assessments & Plan (with Medical Decision Making)   MDM  Patient presenting with finger pain consistent with  paronychia.  Moderate amount of pus was drained from the site.  There is no evidence of surrounding infection of the finger, very unlikely flexor tenosynovitis or septic joint of the hand.  Patient appears quite well and afebrile.  The patient was on doxycycline, will place the patient on Bactrim and told him to stop taking the keflex.  Patient will return if symptoms are worsening.  I do not feel labs or imaging are necessary for today's visit    I have reviewed the nursing notes.    I have reviewed the findings, diagnosis, plan and need for follow up with the patient.    New Prescriptions    SULFAMETHOXAZOLE-TRIMETHOPRIM (BACTRIM DS) 800-160 MG TABLET    Take 1 tablet by mouth 2 times daily for 7 days       Final diagnoses:   Paronychia, finger, left       8/22/2019   [facility], [place], EMERGENCY DEPARTMENT     Venu Wolff MD  08/22/19 [id number]

## 2019-08-22 NOTE — ED AVS SNAPSHOT
[facility], [place], Emergency Department  [address] [place] [id number]  Phone:  [phone]                                    Andrew Lockwood   MRN: [id number]    Department:  [facility], Emergency Department   Date of Visit:  8/22/2019           After Visit Summary Signature Page    I have received my discharge instructions, and my questions have been answered. I have discussed any challenges I see with this plan with the nurse or doctor.    ..........................................................................................................................................  Patient/Patient Representative Signature      ..........................................................................................................................................  Patient Representative Print Name and Relationship to Patient    ..................................................               ................................................  Date                                   Time    ..........................................................................................................................................  Reviewed by Signature/Title    ...................................................              ..............................................  Date                                               Time          22EPIC Rev 08/18

## 2019-08-22 NOTE — DISCHARGE INSTRUCTIONS
Please make an appointment to [facility] with [facility] (phone: [phone] in 3-5 days if not improving.    If Andrew has discomfort from fever or other pain, he can have:  Acetaminophen (Tylenol) every 6 hours as needed. His dose is:    2 regular strength tabs (650 mg)                                     (43.2+ kg/96+ lb)    NOTE: If your acetaminophen (Tylenol) came with a dropper marked with 0.4 and 0.8 ml, call us ([phone]) or check with your doctor about the dose before using it.     AND/OR      Ibuprofen (Advil, Motrin) every 6 hours as needed. His/her dose is:    2 regular strength tabs (400 mg)                                                                         (40-60 kg/ lb)

## 2019-08-23 ENCOUNTER — APPOINTMENT (OUTPATIENT)
Dept: CT IMAGING | Facility: CLINIC | Age: 54
End: 2019-08-23
Attending: FAMILY MEDICINE
Payer: COMMERCIAL

## 2019-08-23 ENCOUNTER — APPOINTMENT (OUTPATIENT)
Dept: GENERAL RADIOLOGY | Facility: CLINIC | Age: 54
End: 2019-08-23
Attending: FAMILY MEDICINE
Payer: COMMERCIAL

## 2019-08-23 ENCOUNTER — HOSPITAL ENCOUNTER (EMERGENCY)
Facility: CLINIC | Age: 54
Discharge: HOME | End: 2019-08-23
Attending: FAMILY MEDICINE | Admitting: FAMILY MEDICINE
Payer: COMMERCIAL

## 2019-08-23 VITALS
WEIGHT: 169.5 LBS | OXYGEN SATURATION: 97 % | RESPIRATION RATE: 18 BRPM | DIASTOLIC BLOOD PRESSURE: 89 MMHG | SYSTOLIC BLOOD PRESSURE: 130 MMHG | TEMPERATURE: 98.1 F | HEIGHT: 64 IN | BODY MASS INDEX: 28.94 KG/M2

## 2019-08-23 DIAGNOSIS — S89.91XA KNEE INJURY, RIGHT, INITIAL ENCOUNTER: ICD-10-CM
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
## 2019-08-23 DIAGNOSIS — S00.81XA FACIAL ABRASION, INITIAL ENCOUNTER: ICD-10-CM

## 2019-08-23 DIAGNOSIS — S09.90XA INJURY OF HEAD, INITIAL ENCOUNTER: ICD-10-CM

## 2019-08-23 PROCEDURE — 99284 EMERGENCY DEPT VISIT MOD MDM: CPT | Mod: Z6 | Performed by: FAMILY MEDICINE

## 2019-08-23 PROCEDURE — 73562 X-RAY EXAM OF KNEE 3: CPT | Mod: RT

## 2019-08-23 PROCEDURE — 70450 CT HEAD/BRAIN W/O DYE: CPT

## 2019-08-23 PROCEDURE — 99285 EMERGENCY DEPT VISIT HI MDM: CPT | Mod: 25 | Performed by: FAMILY MEDICINE

## 2019-08-23 PROCEDURE — 70486 CT MAXILLOFACIAL W/O DYE: CPT

## 2019-08-23 RX ORDER — NAPROXEN 250 MG/1
250 TABLET ORAL 2 TIMES DAILY PRN
Qty: 14 TABLET | Refills: 0 | Status: SHIPPED | OUTPATIENT
Start: 2019-08-23 | End: 2019-11-11

## 2019-08-23 ASSESSMENT — ENCOUNTER SYMPTOMS
FACIAL SWELLING: 1
DECREASED CONCENTRATION: 0
SEIZURES: 0
EYE REDNESS: 0
NAUSEA: 0
FLANK PAIN: 0
ARTHRALGIAS: 0
JOINT SWELLING: 1
NECK STIFFNESS: 0
WOUND: 1
SLEEP DISTURBANCE: 0
RHINORRHEA: 0
ABDOMINAL PAIN: 0
CHEST TIGHTNESS: 0
DYSPHORIC MOOD: 0
NECK PAIN: 1
VOMITING: 0
ACTIVITY CHANGE: 1
CONFUSION: 0
BACK PAIN: 0
FEVER: 0
SINUS PRESSURE: 0
TROUBLE SWALLOWING: 0
WEAKNESS: 0
HALLUCINATIONS: 0
NUMBNESS: 0
HEMATURIA: 0
DIZZINESS: 0
DIFFICULTY URINATING: 0
SHORTNESS OF BREATH: 0
HEADACHES: 1
COLOR CHANGE: 0

## 2019-08-23 ASSESSMENT — MIFFLIN-ST. JEOR: SCORE: 1524.85

## 2019-08-23 NOTE — ED NOTES
Patient not in room, patient's cell phone called per MD instructions. Patient did not answer, voicemail left, waiting for call back. Will f/u.

## 2019-08-23 NOTE — ED NOTES
Andrew presented back to ED. Water bottle that he left was given back. He is also looking for EBT card. Card was not in room when cleaned. Room searched again with no card found. Patient requesting prescription for naproxen. Dr. Rader's instructions included not starting naproxen until tomorrow. Pt educated on discharge instructions and prescription.

## 2019-08-23 NOTE — DISCHARGE INSTRUCTIONS
Home.  Your xray and CT look good without any new injuries.  Ice and bacitracin to abrasions.  Head injury sheet.  See MD for a follow up.  Return if any concerns.    Results for orders placed or performed during the hospital encounter of 08/23/19   CT Head w/o Contrast    Narrative    CT HEAD W/O CONTRAST 8/23/2019 10:36 AM    Provided History: head injury    Comparison: CT head from 8/9/2019.    Technique: Using multidetector thin collimation helical acquisition  technique, axial, coronal and sagittal CT images from the skull base  to the vertex were obtained without intravenous contrast.     Findings:    No intracranial hemorrhage, mass effect, or midline shift. The  ventricles are proportionate to the cerebral sulci. The gray to white  matter differentiation of the cerebral hemispheres is preserved. The  basal cisterns are patent. Postsurgical changes of left occipital  cranioplasty with underlying encephalomalacia in the left cerebellar  hemisphere.    The visualized paranasal sinuses are clear. The mastoid air cells are  clear.       Impression    Impression:   1. No acute intracranial pathology.   2. Post surgical changes of left occipital cranioplasty with  encephalomalacia in the left cerebellar hemisphere.    I have personally reviewed the examination and initial interpretation  and I agree with the findings.    MCKAY IRVING MD   CT Facial Bones without Contrast    Narrative    CT FACIAL BONES WITHOUT CONTRAST 8/23/2019 10:35 AM    History:  facial trauma    Comparison:  CT facial bones from 11/20/2018      Technique: Using thin collimation multidetector helical acquisition  technique, axial and coronal thin section CT images were reconstructed  through the facial bones. Images were reviewed in bone and soft tissue  windows.    Findings: Previous inferior right orbital wall fracture is now healed  with reduction of herniated fat. Resolution of the hemorrhage in the  right ethmoid air cells. Chronic  fracture deformity of the right  lamina papyracea with slight protrusion of the right medial orbital  fat. There is no significant soft tissue swelling of the face.  The  cribriform plate appears intact. Alignment of the facial bones appears  normal.     There is no hematoma, soft tissue mass or gas visualized within the  orbits. The visualized portions of the paranasal sinuses are clear.       Impression    Impression:  1. Healing of the right inferior orbital wall fracture.  2. Unchanged chronic fracture deformity of the right lamina papyracea  .    I have personally reviewed the examination and initial interpretation  and I agree with the findings.    MCKAY IRVING MD   XR Knee Right 3 Views    Narrative    EXAM: XR KNEE RT 3 VW  8/23/2019 10:30 AM      HISTORY: trauma    COMPARISON: 1/3/2017    FINDINGS: 3 nonweightbearing views of the right knee.    No acute osseous abnormality. No joint effusion. No patellar tilt or  subluxation. No substantial degenerative change.    Soft tissues unremarkable.       Impression    IMPRESSION: No acute osseous abnormality.         MARGIE GODWIN MD (Joe)     *Note: Due to a large number of results and/or encounters for the requested time period, some results have not been displayed. A complete set of results can be found in Results Review.

## 2019-08-23 NOTE — ED AVS SNAPSHOT
[facility], [place], Emergency Department  [address] [place] [id number]  Phone:  [phone]                                    Andrew Lockwood   MRN: [id number]    Department:  [facility], Emergency Department   Date of Visit:  8/23/2019           After Visit Summary Signature Page    I have received my discharge instructions, and my questions have been answered. I have discussed any challenges I see with this plan with the nurse or doctor.    ..........................................................................................................................................  Patient/Patient Representative Signature      ..........................................................................................................................................  Patient Representative Print Name and Relationship to Patient    ..................................................               ................................................  Date                                   Time    ..........................................................................................................................................  Reviewed by Signature/Title    ...................................................              ..............................................  Date                                               Time          22EPIC Rev 08/18
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
## 2019-08-31 ENCOUNTER — HOSPITAL ENCOUNTER (EMERGENCY)
Facility: CLINIC | Age: 54
Discharge: HOME | End: 2019-08-31
Attending: EMERGENCY MEDICINE | Admitting: EMERGENCY MEDICINE
Payer: COMMERCIAL

## 2019-08-31 VITALS
DIASTOLIC BLOOD PRESSURE: 66 MMHG | HEART RATE: 91 BPM | RESPIRATION RATE: 16 BRPM | SYSTOLIC BLOOD PRESSURE: 116 MMHG | OXYGEN SATURATION: 99 % | TEMPERATURE: 98.5 F

## 2019-08-31 DIAGNOSIS — B20 HUMAN IMMUNODEFICIENCY VIRUS (HIV) DISEASE (H): ICD-10-CM

## 2019-08-31 DIAGNOSIS — H66.90 ACUTE OTITIS MEDIA, UNSPECIFIED OTITIS MEDIA TYPE: ICD-10-CM

## 2019-08-31 DIAGNOSIS — R51.9 NONINTRACTABLE HEADACHE, UNSPECIFIED CHRONICITY PATTERN, UNSPECIFIED HEADACHE TYPE: ICD-10-CM

## 2019-08-31 PROCEDURE — 99284 EMERGENCY DEPT VISIT MOD MDM: CPT | Mod: Z6 | Performed by: EMERGENCY MEDICINE

## 2019-08-31 PROCEDURE — 99282 EMERGENCY DEPT VISIT SF MDM: CPT | Performed by: EMERGENCY MEDICINE

## 2019-08-31 RX ORDER — AMOXICILLIN 500 MG/1
1000 CAPSULE ORAL 2 TIMES DAILY
Qty: 40 CAPSULE | Refills: 0 | Status: SHIPPED | OUTPATIENT
Start: 2019-08-31 | End: 2019-11-11

## 2019-08-31 ASSESSMENT — ENCOUNTER SYMPTOMS
FEVER: 0
SHORTNESS OF BREATH: 0
HEADACHES: 1
ABDOMINAL PAIN: 0

## 2019-08-31 NOTE — ED AVS SNAPSHOT
[facility], [place], Emergency Department  [address] [place] [id number]  Phone:  [phone]                                    Andrew Lockwood   MRN: [id number]    Department:  [facility], Emergency Department   Date of Visit:  8/31/2019           After Visit Summary Signature Page    I have received my discharge instructions, and my questions have been answered. I have discussed any challenges I see with this plan with the nurse or doctor.    ..........................................................................................................................................  Patient/Patient Representative Signature      ..........................................................................................................................................  Patient Representative Print Name and Relationship to Patient    ..................................................               ................................................  Date                                   Time    ..........................................................................................................................................  Reviewed by Signature/Title    ...................................................              ..............................................  Date                                               Time          22EPIC Rev 08/18
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
## 2019-08-31 NOTE — ED PROVIDER NOTES
History     Chief Complaint   Patient presents with     Otalgia     Headache     HPI  Andrew Lockwood is a 51 year old male with a complicated past medical history including HIV, diabetes, homelessness, frequent emergency department visits, amongst others, who presents to the ER with a complaint of left ear pain and headache.  He states the ear is been bothering him for 1 to 2 days.  No fever, cough, shortness of breath, chest pain, abdominal pain, nausea, vomiting.  He also states that he has had intermittent headaches over the last day.  He states that the pain goes through the left side of his head and shoots through to his occiput.  He states that it is a stabbing type pain, happens approximately once every 25 minutes.  He states the pain will last 1 to 2 seconds at a time then completely goes away.  He does state that he fell on the bus today, states he lost his balance and fell forward.  He was able to catch himself, states he is positive he did not strike his head.  He denies any blurred vision or slurred speech.  No numbness or weakness.  He states he is compliant with his medications.  No other complaints.    Past Medical History:   Diagnosis Date     AIDS (H)      Allergic rhinitis due to other allergen     DNS     Chronic abdominal pain      CNS toxoplasmosis (H)      Diabetes type 2, controlled (H)      GERD (gastroesophageal reflux disease)      HIV (human immunodeficiency virus infection) (H)      Periungual wart      Sleep apnea     doesn't use CPAP       Past Surgical History:   Procedure Laterality Date     C NONSPECIFIC PROCEDURE      right forearm fracture     COLONOSCOPY Left 1/22/2016    Procedure: COMBINED COLONOSCOPY, SINGLE OR MULTIPLE BIOPSY/POLYPECTOMY BY BIOPSY;  Surgeon: Clark Saini MD;  Location: [facility] EXPLORE UNDESC TESTIS,INGUIN/SCROTAL       LAPAROSCOPIC APPENDECTOMY N/A 1/31/2018    Procedure: LAPAROSCOPIC APPENDECTOMY;  LAPAROSCOPIC APPENDECTOMY;  Surgeon:  Dawn Holt MD;  Location: [facility] OR     LAPAROSCOPY DIAGNOSTIC (GENERAL) N/A 2016    Procedure: LAPAROSCOPY DIAGNOSTIC (GENERAL);  Surgeon: Susannah Arriaga MD;  Location: [facility] OR     LAPAROSCOPY DIAGNOSTIC (GENERAL) N/A 2018    Procedure: LAPAROSCOPY DIAGNOSTIC (GENERAL);  Diagnostic laparoscopy and lysis of adhesions;  Surgeon: Prince Dowling MD;  Location: [facility] OR     OPTICAL TRACKING SYSTEM CRANIOTOMY, EXCISE TUMOR, COMBINED Left 4/10/2015    Procedure: COMBINED OPTICAL TRACKING SYSTEM CRANIOTOMY, EXCISE TUMOR;  Surgeon: Mirlande Colmenares MD;  Location: [facility] OR     REPAIR GAMEKEEPER'S THUMB Right 2016    Procedure: REPAIR LIGAMENT ULNAR COLLATERAL THUMB (GAMEKEEPER'S);  Surgeon: Evin Zamorano MD;  Location: [facility] OR       Family History   Problem Relation Age of Onset     Diabetes Brother      Diabetes Father      Alzheimer Disease Father      Unknown/Adopted Mother      Diabetes Paternal Grandfather      Cancer No family hx of         no skin cancer     Skin Cancer No family hx of         no famiy hx of skin cancer     Glaucoma No family hx of      Macular Degeneration No family hx of        Social History     Tobacco Use     Smoking status: Current Some Day Smoker     Packs/day: 0.25     Types: Cigarettes     Last attempt to quit: 10/28/2016     Years since quittin.8     Smokeless tobacco: Former User   Substance Use Topics     Alcohol use: No     Alcohol/week: 0.0 oz     Comment: Last etoh in          I have reviewed the Medications, Allergies, Past Medical and Surgical History, and Social History in the Epic system.    Review of Systems   Constitutional: Negative for fever.   HENT: Positive for ear pain.    Respiratory: Negative for shortness of breath.    Cardiovascular: Negative for chest pain.   Gastrointestinal: Negative for abdominal pain.   Neurological: Positive for headaches.   All other systems reviewed and are negative.      Physical Exam   BP:  110/70  Pulse: 96  Temp: 96.2  F (35.7  C)  Resp: 16  SpO2: 99 %      Physical Exam   Constitutional: He is oriented to person, place, and time. No distress.   HENT:   Head: Atraumatic.   Mouth/Throat: Oropharynx is clear and moist.   Some erythema of the left TM with small amount of fluid behind the TM noted.  No discomfort with movement of the pinna or tragus.   Eyes: Pupils are equal, round, and reactive to light. No scleral icterus.   Neck: Neck supple.   Cardiovascular: Normal heart sounds and intact distal pulses.   Pulmonary/Chest: Breath sounds normal. No respiratory distress.   Abdominal: Soft. There is no tenderness.   Musculoskeletal: He exhibits no edema or tenderness.   Neurological: He is alert and oriented to person, place, and time. No cranial nerve deficit or sensory deficit. He exhibits normal muscle tone.   Skin: Skin is warm. No rash noted. He is not diaphoretic.       ED Course        Procedures             Critical Care time:  none             Labs Ordered and Resulted from Time of ED Arrival Up to the Time of Departure from the ED - No data to display         Assessments & Plan (with Medical Decision Making)   The patient's TM does look a little red, we will go ahead and treat him with amoxicillin for otitis media.  No sign of otitis externa.  He has an atypical headache, stabbing pain that only last 1 to 2 seconds at the time, happens a couple times per hour.  No sign or symptom concerning for infectious etiology.  Additionally, no red flag symptoms to suggest subarachnoid hemorrhage or other bleed.  He has no pain currently.  His neck is supple.  No recent head trauma.  Neuro exam is unremarkable.  He did tell me that he previously had a tumor, symptoms were similar.  However, I do see that he had a CT scan of his head 1 week ago which did not show any concerning findings.  Given this, I do not think that there is high utility in repeating this.  Of note, this is his eighth emergency  department visit to this ER in the last month for a variety of complaints.  He is strongly encouraged to follow-up with primary care next week, return with new or worsening symptoms.  He verbalizes understanding and is agreeable to plan.    Dictation Disclaimer: Some of this Note has been completed with voice-recognition dictation software. Although errors are generally corrected real-time, there is the potential for a rare error to be present in the completed chart.      I have reviewed the nursing notes.    I have reviewed the findings, diagnosis, plan and need for follow up with the patient.    Discharge Medication List as of 8/31/2019  5:45 AM      START taking these medications    Details   amoxicillin (AMOXIL) 500 MG capsule Take 2 capsules (1,000 mg) by mouth 2 times daily for 10 days, Disp-40 capsule, R-0, Local Print             Final diagnoses:   Acute otitis media, unspecified otitis media type   Nonintractable headache, unspecified chronicity pattern, unspecified headache type   Human immunodeficiency virus (HIV) disease (H)       8/31/2019   [facility], [place], EMERGENCY DEPARTMENT     Dawn Yan MD  08/31/19 0608

## 2019-09-02 ENCOUNTER — HOSPITAL ENCOUNTER (EMERGENCY)
Facility: CLINIC | Age: 54
Discharge: HOME | End: 2019-09-02
Attending: EMERGENCY MEDICINE | Admitting: EMERGENCY MEDICINE
Payer: COMMERCIAL

## 2019-09-02 VITALS
RESPIRATION RATE: 16 BRPM | DIASTOLIC BLOOD PRESSURE: 85 MMHG | WEIGHT: 170.1 LBS | BODY MASS INDEX: 29.2 KG/M2 | SYSTOLIC BLOOD PRESSURE: 144 MMHG | OXYGEN SATURATION: 98 % | TEMPERATURE: 97.9 F

## 2019-09-02 DIAGNOSIS — R10.13 DYSPEPSIA: ICD-10-CM

## 2019-09-02 LAB — GLUCOSE BLDC GLUCOMTR-MCNC: 273 MG/DL (ref 70–99)

## 2019-09-02 PROCEDURE — 25000125 ZZHC RX 250: Performed by: EMERGENCY MEDICINE

## 2019-09-02 PROCEDURE — 99283 EMERGENCY DEPT VISIT LOW MDM: CPT | Performed by: EMERGENCY MEDICINE

## 2019-09-02 PROCEDURE — 99283 EMERGENCY DEPT VISIT LOW MDM: CPT | Mod: Z6 | Performed by: EMERGENCY MEDICINE

## 2019-09-02 PROCEDURE — 00000146 ZZHCL STATISTIC GLUCOSE BY METER IP

## 2019-09-02 PROCEDURE — 25000132 ZZH RX MED GY IP 250 OP 250 PS 637: Performed by: EMERGENCY MEDICINE

## 2019-09-02 RX ADMIN — LIDOCAINE HYDROCHLORIDE 30 ML: 20 SOLUTION ORAL; TOPICAL at 07:53

## 2019-09-02 ASSESSMENT — ENCOUNTER SYMPTOMS
VOMITING: 0
NAUSEA: 0
CARDIOVASCULAR NEGATIVE: 1
RESPIRATORY NEGATIVE: 1
FATIGUE: 1
BLOOD IN STOOL: 0
ABDOMINAL PAIN: 1

## 2019-09-02 NOTE — ED PROVIDER NOTES
History     Chief Complaint   Patient presents with     Heartburn     HPI  Andrew Lockwood is a 51 year old male who is well-known to the emergency department currently his primary issue seems to be homelessness.  He can get into a shelter last night.  He comes in complaining of his heartburn being out of control.  He says he is currently not on any medication for this.  He is frequently in the emergency department usually as a respite from homelessness.  He does not have any other concerning symptoms no GI bleed no chest pain no diaphoresis denies alcohol use.      Past Medical History:   Diagnosis Date     AIDS (H)      Allergic rhinitis due to other allergen     DNS     Chronic abdominal pain      CNS toxoplasmosis (H)      Diabetes type 2, controlled (H)      GERD (gastroesophageal reflux disease)      HIV (human immunodeficiency virus infection) (H)      Periungual wart      Sleep apnea     doesn't use CPAP       Social History     Socioeconomic History     Marital status: Single     Spouse name: Not on file     Number of children: Not on file     Years of education: Not on file     Highest education level: Not on file   Occupational History     Occupation:      Comment: 5 years; temp agency   Social Needs     Financial resource strain: Not on file     Food insecurity:     Worry: Not on file     Inability: Not on file     Transportation needs:     Medical: Not on file     Non-medical: Not on file   Tobacco Use     Smoking status: Current Some Day Smoker     Packs/day: 0.25     Types: Cigarettes     Last attempt to quit: 10/28/2016     Years since quittin.8     Smokeless tobacco: Former User   Substance and Sexual Activity     Alcohol use: No     Alcohol/week: 0.0 oz     Comment: Last etoh in      Drug use: Yes     Types: Marijuana     Sexual activity: Not Currently     Partners: Female, Male     Comment: Last sexual activity    Lifestyle     Physical activity:     Days per week: Not on  file     Minutes per session: Not on file     Stress: Not on file   Relationships     Social connections:     Talks on phone: Not on file     Gets together: Not on file     Attends Protestant service: Not on file     Active member of club or organization: Not on file     Attends meetings of clubs or organizations: Not on file     Relationship status: Not on file     Intimate partner violence:     Fear of current or ex partner: Not on file     Emotionally abused: Not on file     Physically abused: Not on file     Forced sexual activity: Not on file   Other Topics Concern     Parent/sibling w/ CABG, MI or angioplasty before 65F 55M? Not Asked   Social History Narrative    Born in [facility].  Came to the [place] in [place].  Last traveled to visit family in 2008.        1/7/2019 - Homeless. Working with a  at [employer]  trying to get housing. Sexually active with two partners recently using condoms 100% of the time. Smokes occasionally, not for the last 4 weeks.         I have reviewed the Medications, Allergies, Past Medical and Surgical History, and Social History in the Epic system.    Review of Systems   Constitutional: Positive for fatigue.   Respiratory: Negative.    Cardiovascular: Negative.    Gastrointestinal: Positive for abdominal pain. Negative for blood in stool, nausea and vomiting.   All other systems reviewed and are negative.      Physical Exam   BP: (!) 130/91  Heart Rate: 91  Temp: 97.9  F (36.6  C)  Resp: 18  Weight: 77.2 kg (170 lb 1.6 oz)  SpO2: 100 %      Physical Exam   Constitutional: He is oriented to person, place, and time.   Disheveled, mqalodorous   Cardiovascular: Normal rate, regular rhythm and normal heart sounds.   Pulmonary/Chest: Effort normal and breath sounds normal.   Abdominal: Soft. There is no tenderness.   Neurological: He is oriented to person, place, and time.   Psychiatric: He has a normal mood and affect.   Nursing note and vitals reviewed.      ED Course         Procedures        Medications   lidocaine (XYLOCAINE) 2 % 15 mL, alum & mag hydroxide-simethicone (MYLANTA ES/MAALOX  ES) 15 mL GI Cocktail (has no administration in time range)            Labs Ordered and Resulted from Time of ED Arrival Up to the Time of Departure from the ED - No data to display         Assessments & Plan (with Medical Decision Making)   This is the 10th ER visit this month for this patient.  He is homeless and fatigued and could not get into a shelter last night.  Complains that his heartburn is out of control he says he is on no medications for this.  His physical exam was on concerning I believe he is basically looking for a place to sleep.  I gave him a GI cocktail and will discharge him with omeprazole.  He does have a primary clinic for follow-up.  I am not concerned that this is anything more than gastritis and homelessness.    I have reviewed the nursing notes.    I have reviewed the findings, diagnosis, plan and need for follow up with the patient.    New Prescriptions    OMEPRAZOLE (PRILOSEC) 20 MG DR CAPSULE    Take 1 capsule (20 mg) by mouth daily       Final diagnoses:   Dyspepsia       9/2/2019   [facility], [place], EMERGENCY DEPARTMENT     Thanh Lehman MD  09/02/19 0715

## 2019-09-02 NOTE — ED TRIAGE NOTES
Per patient report heartburn since 2200. Did not take any medication for it. Denies abdominal pain, nausea, emesis, diarrhea

## 2019-09-02 NOTE — ED AVS SNAPSHOT
[facility], [place], Emergency Department  [address] [place] [id number]  Phone:  [phone]                                    Andrew Lockwood   MRN: [id number]    Department:  [facility], Emergency Department   Date of Visit:  9/2/2019           After Visit Summary Signature Page    I have received my discharge instructions, and my questions have been answered. I have discussed any challenges I see with this plan with the nurse or doctor.    ..........................................................................................................................................  Patient/Patient Representative Signature      ..........................................................................................................................................  Patient Representative Print Name and Relationship to Patient    ..................................................               ................................................  Date                                   Time    ..........................................................................................................................................  Reviewed by Signature/Title    ...................................................              ..............................................  Date                                               Time          22EPIC Rev 08/18

## 2019-09-14 ENCOUNTER — HOSPITAL ENCOUNTER (EMERGENCY)
Facility: CLINIC | Age: 54
Discharge: HOME | End: 2019-09-14
Attending: EMERGENCY MEDICINE | Admitting: EMERGENCY MEDICINE
Payer: COMMERCIAL

## 2019-09-14 VITALS
OXYGEN SATURATION: 97 % | TEMPERATURE: 98.4 F | WEIGHT: 170.9 LBS | HEIGHT: 64 IN | RESPIRATION RATE: 18 BRPM | DIASTOLIC BLOOD PRESSURE: 84 MMHG | SYSTOLIC BLOOD PRESSURE: 145 MMHG | BODY MASS INDEX: 29.18 KG/M2

## 2019-09-14 DIAGNOSIS — M79.10 MYALGIA: ICD-10-CM

## 2019-09-14 LAB
ANION GAP SERPL CALCULATED.3IONS-SCNC: 7 MMOL/L (ref 3–14)
BUN SERPL-MCNC: 16 MG/DL (ref 7–30)
CALCIUM SERPL-MCNC: 8.5 MG/DL (ref 8.5–10.1)
CHLORIDE SERPL-SCNC: 104 MMOL/L (ref 94–109)
CO2 SERPL-SCNC: 24 MMOL/L (ref 20–32)
CREAT SERPL-MCNC: 0.76 MG/DL (ref 0.66–1.25)
GFR SERPL CREATININE-BSD FRML MDRD: >90 ML/MIN/{1.73_M2}
GLUCOSE SERPL-MCNC: 367 MG/DL (ref 70–99)
POTASSIUM SERPL-SCNC: 3.8 MMOL/L (ref 3.4–5.3)
SODIUM SERPL-SCNC: 135 MMOL/L (ref 133–144)

## 2019-09-14 PROCEDURE — 96372 THER/PROPH/DIAG INJ SC/IM: CPT | Performed by: EMERGENCY MEDICINE

## 2019-09-14 PROCEDURE — 80048 BASIC METABOLIC PNL TOTAL CA: CPT | Performed by: EMERGENCY MEDICINE

## 2019-09-14 PROCEDURE — 99283 EMERGENCY DEPT VISIT LOW MDM: CPT | Mod: Z6 | Performed by: EMERGENCY MEDICINE

## 2019-09-14 PROCEDURE — 25000125 ZZHC RX 250: Performed by: EMERGENCY MEDICINE

## 2019-09-14 PROCEDURE — 99284 EMERGENCY DEPT VISIT MOD MDM: CPT | Performed by: EMERGENCY MEDICINE

## 2019-09-14 PROCEDURE — 25000131 ZZH RX MED GY IP 250 OP 636 PS 637: Performed by: EMERGENCY MEDICINE

## 2019-09-14 PROCEDURE — 25000132 ZZH RX MED GY IP 250 OP 250 PS 637: Performed by: EMERGENCY MEDICINE

## 2019-09-14 RX ORDER — IBUPROFEN 600 MG/1
600 TABLET, FILM COATED ORAL ONCE
Status: COMPLETED | OUTPATIENT
Start: 2019-09-14 | End: 2019-09-14

## 2019-09-14 RX ADMIN — OMEPRAZOLE 20 MG: 20 CAPSULE, DELAYED RELEASE ORAL at 22:12

## 2019-09-14 RX ADMIN — INSULIN ASPART 4 UNITS: 100 INJECTION, SOLUTION INTRAVENOUS; SUBCUTANEOUS at 22:40

## 2019-09-14 RX ADMIN — LIDOCAINE HYDROCHLORIDE 30 ML: 20 SOLUTION ORAL; TOPICAL at 22:11

## 2019-09-14 RX ADMIN — IBUPROFEN 600 MG: 600 TABLET, FILM COATED ORAL at 22:40

## 2019-09-14 ASSESSMENT — MIFFLIN-ST. JEOR: SCORE: 1531.2

## 2019-09-14 NOTE — ED AVS SNAPSHOT
[facility], [place], Emergency Department  [address] [place] [address]  Phone:  [phone]                                    Andrew Lockwood   MRN: [id number]    Department:  [facility], Emergency Department   Date of Visit:  9/14/2019           After Visit Summary Signature Page    I have received my discharge instructions, and my questions have been answered. I have discussed any challenges I see with this plan with the nurse or doctor.    ..........................................................................................................................................  Patient/Patient Representative Signature      ..........................................................................................................................................  Patient Representative Print Name and Relationship to Patient    ..................................................               ................................................  Date                                   Time    ..........................................................................................................................................  Reviewed by Signature/Title    ...................................................              ..............................................  Date                                               Time          22EPIC Rev 08/18

## 2019-09-15 NOTE — ED TRIAGE NOTES
Pt c/o generalized body aches that have been going on for a few days. Pt states he is also having diarrhea and facial discoloration d/t not taking his HIV meds for a week.

## 2019-09-15 NOTE — DISCHARGE INSTRUCTIONS
Please make an appointment to follow up with [facility] (phone: [phone]) as soon as possible.

## 2019-09-20 ENCOUNTER — HOSPITAL ENCOUNTER (EMERGENCY)
Facility: CLINIC | Age: 54
Discharge: HOME | End: 2019-09-21
Attending: EMERGENCY MEDICINE | Admitting: EMERGENCY MEDICINE
Payer: COMMERCIAL

## 2019-09-20 DIAGNOSIS — E86.0 DEHYDRATION: ICD-10-CM

## 2019-09-20 DIAGNOSIS — R19.7 DIARRHEA, UNSPECIFIED TYPE: ICD-10-CM

## 2019-09-20 LAB
ALBUMIN SERPL-MCNC: 3.4 G/DL (ref 3.4–5)
ALP SERPL-CCNC: 163 U/L (ref 40–150)
ALT SERPL W P-5'-P-CCNC: 24 U/L (ref 0–70)
ANION GAP SERPL CALCULATED.3IONS-SCNC: 10 MMOL/L (ref 3–14)
AST SERPL W P-5'-P-CCNC: 16 U/L (ref 0–45)
BASOPHILS # BLD AUTO: 0 10E9/L (ref 0–0.2)
BASOPHILS NFR BLD AUTO: 0 %
BILIRUB SERPL-MCNC: 0.6 MG/DL (ref 0.2–1.3)
BUN SERPL-MCNC: 11 MG/DL (ref 7–30)
CA-I BLD-MCNC: 4.7 MG/DL (ref 4.4–5.2)
CALCIUM SERPL-MCNC: 9 MG/DL (ref 8.5–10.1)
CHLORIDE SERPL-SCNC: 100 MMOL/L (ref 94–109)
CO2 BLDCOV-SCNC: 26 MMOL/L (ref 21–28)
CO2 SERPL-SCNC: 24 MMOL/L (ref 20–32)
CREAT SERPL-MCNC: 0.73 MG/DL (ref 0.66–1.25)
DIFFERENTIAL METHOD BLD: ABNORMAL
EOSINOPHIL # BLD AUTO: 0.1 10E9/L (ref 0–0.7)
EOSINOPHIL NFR BLD AUTO: 1.7 %
ERYTHROCYTE [DISTWIDTH] IN BLOOD BY AUTOMATED COUNT: 12.7 % (ref 10–15)
GFR SERPL CREATININE-BSD FRML MDRD: >90 ML/MIN/{1.73_M2}
GLUCOSE BLDC GLUCOMTR-MCNC: 386 MG/DL (ref 70–99)
GLUCOSE SERPL-MCNC: 443 MG/DL (ref 70–99)
HCT VFR BLD AUTO: 39.1 % (ref 40–53)
HGB BLD-MCNC: 13.1 G/DL (ref 13.3–17.7)
LACTATE BLD-SCNC: 2.2 MMOL/L (ref 0.7–2.1)
LACTATE BLD-SCNC: 2.6 MMOL/L (ref 0.7–2)
LYMPHOCYTES # BLD AUTO: 2.6 10E9/L (ref 0.8–5.3)
LYMPHOCYTES NFR BLD AUTO: 43.5 %
MAGNESIUM SERPL-MCNC: 2.1 MG/DL (ref 1.6–2.3)
MCH RBC QN AUTO: 30.7 PG (ref 26.5–33)
MCHC RBC AUTO-ENTMCNC: 33.5 G/DL (ref 31.5–36.5)
MCV RBC AUTO: 92 FL (ref 78–100)
MONOCYTES # BLD AUTO: 0.3 10E9/L (ref 0–1.3)
MONOCYTES NFR BLD AUTO: 5.2 %
NEUTROPHILS # BLD AUTO: 2.9 10E9/L (ref 1.6–8.3)
NEUTROPHILS NFR BLD AUTO: 49.6 %
PCO2 BLDV: 45 MM HG (ref 40–50)
PH BLDV: 7.38 PH (ref 7.32–7.43)
PHOSPHATE SERPL-MCNC: 3.5 MG/DL (ref 2.5–4.5)
PLATELET # BLD AUTO: 293 10E9/L (ref 150–450)
PLATELET # BLD EST: ABNORMAL 10*3/UL
PO2 BLDV: 56 MM HG (ref 25–47)
POTASSIUM SERPL-SCNC: 4 MMOL/L (ref 3.4–5.3)
PROT SERPL-MCNC: 7 G/DL (ref 6.8–8.8)
RBC # BLD AUTO: 4.27 10E12/L (ref 4.4–5.9)
RBC MORPH BLD: NORMAL
SAO2 % BLDV FROM PO2: 88 %
SODIUM SERPL-SCNC: 135 MMOL/L (ref 133–144)
WBC # BLD AUTO: 5.9 10E9/L (ref 4–11)

## 2019-09-20 PROCEDURE — 96361 HYDRATE IV INFUSION ADD-ON: CPT

## 2019-09-20 PROCEDURE — 82330 ASSAY OF CALCIUM: CPT | Performed by: EMERGENCY MEDICINE

## 2019-09-20 PROCEDURE — 83605 ASSAY OF LACTIC ACID: CPT | Performed by: EMERGENCY MEDICINE

## 2019-09-20 PROCEDURE — 83735 ASSAY OF MAGNESIUM: CPT | Performed by: EMERGENCY MEDICINE

## 2019-09-20 PROCEDURE — 99284 EMERGENCY DEPT VISIT MOD MDM: CPT | Mod: Z6 | Performed by: EMERGENCY MEDICINE

## 2019-09-20 PROCEDURE — 84100 ASSAY OF PHOSPHORUS: CPT | Performed by: EMERGENCY MEDICINE

## 2019-09-20 PROCEDURE — 80053 COMPREHEN METABOLIC PANEL: CPT | Performed by: EMERGENCY MEDICINE

## 2019-09-20 PROCEDURE — 25800030 ZZH RX IP 258 OP 636: Performed by: EMERGENCY MEDICINE

## 2019-09-20 PROCEDURE — 96360 HYDRATION IV INFUSION INIT: CPT

## 2019-09-20 PROCEDURE — 82803 BLOOD GASES ANY COMBINATION: CPT

## 2019-09-20 PROCEDURE — 00000146 ZZHCL STATISTIC GLUCOSE BY METER IP

## 2019-09-20 PROCEDURE — 99284 EMERGENCY DEPT VISIT MOD MDM: CPT | Mod: 25

## 2019-09-20 PROCEDURE — 85025 COMPLETE CBC W/AUTO DIFF WBC: CPT | Performed by: EMERGENCY MEDICINE

## 2019-09-20 PROCEDURE — 83605 ASSAY OF LACTIC ACID: CPT

## 2019-09-20 RX ORDER — SODIUM CHLORIDE 9 MG/ML
INJECTION, SOLUTION INTRAVENOUS CONTINUOUS
Status: DISCONTINUED | OUTPATIENT
Start: 2019-09-20 | End: 2019-09-21 | Stop reason: HOSPADM

## 2019-09-20 RX ADMIN — SODIUM CHLORIDE: 9 INJECTION, SOLUTION INTRAVENOUS at 21:37

## 2019-09-20 RX ADMIN — SODIUM CHLORIDE: 9 INJECTION, SOLUTION INTRAVENOUS at 23:43

## 2019-09-20 NOTE — ED TRIAGE NOTES
Pt reports Diarrhea/light headedness for the past 48 hours. Today it has gotten worse. Afebrile 98.3.

## 2019-09-20 NOTE — ED AVS SNAPSHOT
[facility], [place], Emergency Department  [address] [place] [id number]  Phone:  [phone]                                    Andrew Lockwood   MRN: [id number]    Department:  [facility], Emergency Department   Date of Visit:  9/20/2019           After Visit Summary Signature Page    I have received my discharge instructions, and my questions have been answered. I have discussed any challenges I see with this plan with the nurse or doctor.    ..........................................................................................................................................  Patient/Patient Representative Signature      ..........................................................................................................................................  Patient Representative Print Name and Relationship to Patient    ..................................................               ................................................  Date                                   Time    ..........................................................................................................................................  Reviewed by Signature/Title    ...................................................              ..............................................  Date                                               Time          22EPIC Rev 08/18

## 2019-09-21 VITALS
WEIGHT: 170.5 LBS | RESPIRATION RATE: 18 BRPM | BODY MASS INDEX: 29.27 KG/M2 | TEMPERATURE: 98.3 F | SYSTOLIC BLOOD PRESSURE: 138 MMHG | HEART RATE: 83 BPM | OXYGEN SATURATION: 100 % | DIASTOLIC BLOOD PRESSURE: 98 MMHG

## 2019-09-21 LAB
KETONES BLD-SCNC: 0 MMOL/L (ref 0–0.6)
LACTATE BLD-SCNC: 1.2 MMOL/L (ref 0.7–2)

## 2019-09-21 PROCEDURE — 25000128 H RX IP 250 OP 636: Performed by: EMERGENCY MEDICINE

## 2019-09-21 PROCEDURE — 83605 ASSAY OF LACTIC ACID: CPT | Mod: 91 | Performed by: EMERGENCY MEDICINE

## 2019-09-21 PROCEDURE — 82010 KETONE BODYS QUAN: CPT | Performed by: EMERGENCY MEDICINE

## 2019-09-21 RX ADMIN — SODIUM CHLORIDE 1000 ML: 9 INJECTION, SOLUTION INTRAVENOUS at 02:07

## 2019-09-21 NOTE — ED PROVIDER NOTES
"      [place] [facility] DE[facility]MENT ([facility])  September 20, 2019    History     Chief Complaint   Patient presents with     Diarrhea     Light Headed/Dizzy/Muscle cramps     HPI  Andrew Lockwood is a 51 year old male, w/ PMH notable for HIV, previous bowel obstructions, toxoplasmosis (including CNS component), DM 2, GERD, anxiety and panic disorder, homelessness with multiple frequent ED visits, presenting today generally feeling unwell with abdominal and whole body \"cramps,\" loose stools, and concern for dehydration.    Pt presenting w/ a day of nonbloody diarrhea, feeling dehydrated w/ diffuse body aches of the same time period, described as \"cramping\" in the stomach and \"all over\"/\"the whole body\".  He reports that he is felt like this before when he has been dehydrated.  No fevers or chills.  No night sweats or unplanned weight loss. No N/V. No known ill contacts. Feels dehydrdated and all over body cramps, but no particular pain.    No bladder changes or symptoms. Stools are loose, near-watery (somewhat like applesauce), but nonbloody, no mucous. Not particularly malodorous.  Denies any recent antimicrobials. No CP, shortness of breath, palpitations, LH, dizziness, syncope or near syncope.  No rashes or skin changes.  No extremity or joint pain/swelling.  No new neck pain or stiffness.  No new headaches or vision changes. No traumas or falls.  No new activities or potential overuse injuries.    Of note, per Chart review, patient's CD4 count on 6/7/19 was 317.     I have reviewed the Medications, Allergies, Past Medical and Surgical History, and Social History in the [employer] system.  Past Medical History:   Diagnosis Date     AIDS (H)      Allergic rhinitis due to other allergen     DNS     Chronic abdominal pain      CNS toxoplasmosis (H)      Diabetes type 2, controlled (H)      GERD (gastroesophageal reflux disease)      HIV (human immunodeficiency virus infection) (H)      Periungual wart      Sleep " apnea     doesn't use CPAP       Past Surgical History:   Procedure Laterality Date     C NONSPECIFIC PROCEDURE      right forearm fracture     COLONOSCOPY Left 2016    Procedure: COMBINED COLONOSCOPY, SINGLE OR MULTIPLE BIOPSY/POLYPECTOMY BY BIOPSY;  Surgeon: Clark Saini MD;  Location: [facility] GI     HC EXPLORE UNDESC TESTIS,INGUIN/SCROTAL       LAPAROSCOPIC APPENDECTOMY N/A 2018    Procedure: LAPAROSCOPIC APPENDECTOMY;  LAPAROSCOPIC APPENDECTOMY;  Surgeon: Dawn Holt MD;  Location: [facility] OR     LAPAROSCOPY DIAGNOSTIC (GENERAL) N/A 2016    Procedure: LAPAROSCOPY DIAGNOSTIC (GENERAL);  Surgeon: Susannah Arriaga MD;  Location: [facility] OR     LAPAROSCOPY DIAGNOSTIC (GENERAL) N/A 2018    Procedure: LAPAROSCOPY DIAGNOSTIC (GENERAL);  Diagnostic laparoscopy and lysis of adhesions;  Surgeon: Prince Dowling MD;  Location: [facility] OR     OPTICAL TRACKING SYSTEM CRANIOTOMY, EXCISE TUMOR, COMBINED Left 4/10/2015    Procedure: COMBINED OPTICAL TRACKING SYSTEM CRANIOTOMY, EXCISE TUMOR;  Surgeon: Mirlande Colmenares MD;  Location: [facility] OR     REPAIR GAMEKEEPER'S THUMB Right 2016    Procedure: REPAIR LIGAMENT ULNAR COLLATERAL THUMB (GAMEKEEPER'S);  Surgeon: Evin Zamorano MD;  Location: [facility] OR       Family History   Problem Relation Age of Onset     Diabetes Brother      Diabetes Father      Alzheimer Disease Father      Unknown/Adopted Mother      Diabetes Paternal Grandfather      Cancer No family hx of         no skin cancer     Skin Cancer No family hx of         no famiy hx of skin cancer     Glaucoma No family hx of      Macular Degeneration No family hx of        Social History     Tobacco Use     Smoking status: Current Some Day Smoker     Packs/day: 0.25     Types: Cigarettes     Last attempt to quit: 10/28/2016     Years since quittin.9     Smokeless tobacco: Former User   Substance Use Topics     Alcohol use: No     Alcohol/week: 0.0 standard drinks     Comment:  "Last etoh in 2007         Review of Systems   Constitutional: Positive for fatigue. Negative for appetite change, chills, diaphoresis, fever and unexpected weight change.   HENT: Negative for sore throat and trouble swallowing.    Eyes: Negative for visual disturbance.   Respiratory: Negative for cough and shortness of breath.    Cardiovascular: Negative for chest pain, palpitations and leg swelling.   Gastrointestinal: Positive for abdominal pain (\"cramping\" (consistent w/ previous)) and diarrhea (occasional loose stools (like applesauce), not consistent). Negative for blood in stool (no BRBPR or melena), constipation, nausea and vomiting.   Genitourinary: Negative.    Musculoskeletal: Positive for back pain and myalgias. Negative for arthralgias, gait problem, joint swelling, neck pain and neck stiffness.   Skin: Negative for color change and rash.   Neurological: Negative for seizures, syncope, weakness, light-headedness, numbness and headaches.   Hematological: Negative for adenopathy.   All other systems reviewed and are negative.      Physical Exam   BP: 132/83  Heart Rate: 111  Temp: 98.3  F (36.8  C)  Resp: 18  Weight: 77.3 kg (170 lb 8 oz)  SpO2: 97 %      Physical Exam  CONSTITUTIONAL: Well-developed and well-nourished. Awake and alert. Non-toxic appearance. No acute distress.   HENT:   - Head: Normocephalic and atraumatic.   - Ears: Hearing and external ear grossly normal.   - Nose: Nose normal. No rhinorrhea. No epistaxis.   - Mouth/Throat: MMM  EYES: Conjunctivae and lids are normal. No scleral icterus.   NECK: Normal range of motion and phonation normal. Neck supple.  No tracheal deviation, no stridor. No edema or erythema noted. No meningeal findings  CARDIOVASCULAR: Mild tachycardia, regular rhythm and no appreciable abnormal heart sounds.  PULMONARY/CHEST: Normal work of breathing. No accessory muscle usage or stridor. No respiratory distress.  No appreciable abnormal breath sounds.  ABDOMEN: Soft, " "non-distended. No tenderness. No rigidity, rebound or guarding.   MUSCULOSKELETAL: Extremities warm and seemingly well perfused.  No apparent MSK swelling, joint effusions, etc.  Normal skin.  No particular pain to palpation, no focal bony tenderness.  ROM intact throughout.  NEUROLOGIC: Awake, alert. Not disoriented. He displays no atrophy and no tremor. Normal tone. No seizure activity. GCS 15  SKIN: Skin is warm and dry. No rash noted. No diaphoresis. No pallor.   PSYCHIATRIC: Unable to assess fully, but patient does appear to have linear thought processes, does not appear to be responding to internal stimuli and denies any SI or HI.        Assessments & Plan (with Medical Decision Making)   IMPRESSION: 51 year old male, well-known to the ED, w/ PMH notable for HIV, previous bowel obstructions, toxoplasmosis (including CNS component), DM 2, GERD, anxiety and panic disorder, homelessness with multiple frequent ED visits, presenting today generally feeling unwell with abdominal and whole body \"cramps\" and concern for dehydration as he says he has not been able to eat and drink well in the setting of his homelessness as described further above in HPI/ROS.    Clinically, patient appears nontoxic, NAD. Vitals some mild elevation of HR on arrival but vitals otherwise grossly WNL.. Otherwise on examination, abdominal exam benign, no meningeal or CNS/neuro type findings currently. No findings for compartment syndrome or other MSK findings.     Ddx includes, but not limited to, Dehydration, myalgias or muscle cramps from electrolyte derangement, myositis, exacerbation/recurrence, of chronic abdominal cramping less likely pain from infection, secondary gain, et al.     PLAN: Labs, urine studies, hydration clinical observation and reassessment    RESULTS:  - Labs: Elevated glucose but normal anion gap, does not appear to be in DKA or HHS (appears to have chronic hyperglycemia)  - Urine: No sample yet provided  - Imaging: No " emergent imaging seemingly indicated at this time    INTERVENTIONS:   - IVF    RE-EVALUATION:  - Glucose and lactate improving with fluid.    - Pt otherwise continues to do well here in the ED, no acute issues or apparent concerning changes in vitals or clinical appearance.  - Reports feeling somewhat better    DISCUSSIONS:  - w/ Patient: I have reviewed the available findings, plan with the patient at [facility] time of shift change/signout    SIGNOUT:  - Patient signed out to overnight EM Physician.  - Impression at shift change: Hyperglycemia, reported nonbloody diarrhea, dehydration  - Pending at shift change: Labs, clinical reassessment  - Tentative plan: F/U pending testing, reassess clinically after fluids, if feeling improved and labs improved, likely ok for close outpatient F/U. If labs worsen, symptoms/appearance worsens, pursue accordingly.         ______________________________________________________________________          9/20/2019   [facility], [place], EMERGENCY DEPARTMENT     Ceci Trammell MD  09/23/19 [id number]

## 2019-09-21 NOTE — ED NOTES
Sign Out Provider: Dr. Trammell    Sign Out Plan: Patient is a 51-year-old male well-known to the emergency department who presents with diarrhea, feeling dehydrated.  Patient with hyperglycemia, elevation of lactate likely due to dehydration.  At this time plan for IV hydration, repeat lactate and reevaluation.  Likely discharge home.    Reassessment: On reevaluation patient feeling better, patient has been resting comfortably throughout the night.  Repeat lactic acid normal 1.2, ketone beta hydroxybutyrate 0, at this time patient will be discharged home with outpatient follow-up.  Return precautions discussed.    Disposition: Discharged home       Sheila Bruno MD  09/21/19 [id number]

## 2019-09-21 NOTE — DISCHARGE INSTRUCTIONS
Please follow-up with your primary doctor.  Please call to make an appointment.  Please make sure you continue your own medications, rest, drink plenty of fluids to stay hydrated.  Return to the emergency department if worsening symptoms.

## 2019-09-23 ASSESSMENT — ENCOUNTER SYMPTOMS
DIARRHEA: 1
JOINT SWELLING: 0
CHILLS: 0
NAUSEA: 0
ABDOMINAL PAIN: 1
UNEXPECTED WEIGHT CHANGE: 0
TROUBLE SWALLOWING: 0
SORE THROAT: 0
MYALGIAS: 1
WEAKNESS: 0
NUMBNESS: 0
SHORTNESS OF BREATH: 0
DIAPHORESIS: 0
NECK STIFFNESS: 0
COUGH: 0
APPETITE CHANGE: 0
ADENOPATHY: 0
NECK PAIN: 0
FEVER: 0
BLOOD IN STOOL: 0
PALPITATIONS: 0
HEADACHES: 0
COLOR CHANGE: 0
FATIGUE: 1
LIGHT-HEADEDNESS: 0
VOMITING: 0
ARTHRALGIAS: 0
CONSTIPATION: 0
BACK PAIN: 1
SEIZURES: 0

## 2019-09-26 ASSESSMENT — ENCOUNTER SYMPTOMS
SHORTNESS OF BREATH: 0
FEVER: 0
ABDOMINAL PAIN: 0

## 2019-09-26 NOTE — ED PROVIDER NOTES
"  History     Chief Complaint   Patient presents with     Generalized Body Aches     HPI  Andrew Lockwood is a 51 year old male, w/ PMH notable for HIV, previous bowel obstructions, toxoplasmosis (including CNS component), DM 2, GERD, anxiety and panic disorder, homelessness with multiple frequent ED visits, who comes in with complaints of his chronic myalgias diffusely as well as chronic diarrhea. He denies any different symptoms today compared to priro visits.     I have reviewed the Medications, Allergies, Past Medical and Surgical History, and Social History in the [employer] system.    Review of Systems   Constitutional: Negative for fever.   Respiratory: Negative for shortness of breath.    Cardiovascular: Negative for chest pain.   Gastrointestinal: Negative for abdominal pain.   All other systems reviewed and are negative.      Physical Exam   BP: (!) 145/84  Heart Rate: 97  Temp: 98.4  F (36.9  C)  Resp: 18  Height: 162.6 cm (5' 4\")  Weight: 77.5 kg (170 lb 14.4 oz)  SpO2: 97 %      Physical Exam  Nursing note reviewed.   Constitutional:       General: He is not in acute distress.     Appearance: He is not diaphoretic.   HENT:      Head: Atraumatic.   Eyes:      General: No scleral icterus.     Pupils: Pupils are equal, round, and reactive to light.   Cardiovascular:      Heart sounds: Normal heart sounds.   Pulmonary:      Effort: No respiratory distress.      Breath sounds: Normal breath sounds.   Abdominal:      General: Bowel sounds are normal.      Palpations: Abdomen is soft.      Tenderness: There is no tenderness.   Musculoskeletal:         General: No tenderness.   Skin:     General: Skin is warm.      Findings: No rash.         ED Course        Procedures             Critical Care time:  none             Labs Ordered and Resulted from Time of ED Arrival Up to the Time of Departure from the ED   BASIC METABOLIC PANEL - Abnormal; Notable for the following components:       Result Value    Glucose 367 (*)  "    All other components within normal limits            Assessments & Plan (with Medical Decision Making)      51 year old male, w/ PMH notable for HIV, previous bowel obstructions, toxoplasmosis (including CNS component), DM 2, GERD, anxiety and panic disorder, homelessness with multiple frequent ED visits, who comes in with complaints of his chronic myalgias diffusely as well as chronic diarrhea. LAbs remarkable for glucose 367 but with normal AG. Patient given novolog 4 U,ibuprofen 600 mg, GI cocktail. Was able to tolerate po hydration. Plan for PCP follow up.     I have reviewed the nursing notes.    I have reviewed the findings, diagnosis, plan and need for follow up with the patient.    Discharge Medication List as of 9/14/2019 11:19 PM          Final diagnoses:   Myalgia       9/14/2019   [facility], [place], EMERGENCY DEPARTMENT     Tahmina Barkley MD  09/26/19 2405

## 2019-09-27 ENCOUNTER — HOSPITAL ENCOUNTER (EMERGENCY)
Facility: CLINIC | Age: 54
Discharge: HOME | End: 2019-09-27
Attending: EMERGENCY MEDICINE | Admitting: EMERGENCY MEDICINE
Payer: COMMERCIAL

## 2019-09-27 VITALS
OXYGEN SATURATION: 98 % | TEMPERATURE: 98.8 F | WEIGHT: 170 LBS | HEIGHT: 64 IN | BODY MASS INDEX: 29.02 KG/M2 | SYSTOLIC BLOOD PRESSURE: 122 MMHG | HEART RATE: 89 BPM | RESPIRATION RATE: 16 BRPM | DIASTOLIC BLOOD PRESSURE: 92 MMHG

## 2019-09-27 DIAGNOSIS — L02.31 ABSCESS OF BUTTOCK: ICD-10-CM

## 2019-09-27 PROCEDURE — 10060 I&D ABSCESS SIMPLE/SINGLE: CPT | Mod: Z6 | Performed by: EMERGENCY MEDICINE

## 2019-09-27 PROCEDURE — 10060 I&D ABSCESS SIMPLE/SINGLE: CPT | Performed by: EMERGENCY MEDICINE

## 2019-09-27 PROCEDURE — 99284 EMERGENCY DEPT VISIT MOD MDM: CPT | Mod: 25 | Performed by: EMERGENCY MEDICINE

## 2019-09-27 PROCEDURE — 99283 EMERGENCY DEPT VISIT LOW MDM: CPT | Mod: 25 | Performed by: EMERGENCY MEDICINE

## 2019-09-27 PROCEDURE — 25000132 ZZH RX MED GY IP 250 OP 250 PS 637: Performed by: EMERGENCY MEDICINE

## 2019-09-27 RX ORDER — LIDOCAINE HYDROCHLORIDE 10 MG/ML
INJECTION, SOLUTION EPIDURAL; INFILTRATION; INTRACAUDAL; PERINEURAL
Status: DISCONTINUED
Start: 2019-09-27 | End: 2019-09-27 | Stop reason: HOSPADM

## 2019-09-27 RX ORDER — IBUPROFEN 600 MG/1
600 TABLET, FILM COATED ORAL ONCE
Status: COMPLETED | OUTPATIENT
Start: 2019-09-27 | End: 2019-09-27

## 2019-09-27 RX ORDER — LIDOCAINE HYDROCHLORIDE 10 MG/ML
10 INJECTION, SOLUTION EPIDURAL; INFILTRATION; INTRACAUDAL; PERINEURAL ONCE
Status: DISCONTINUED | OUTPATIENT
Start: 2019-09-27 | End: 2019-09-28 | Stop reason: HOSPADM

## 2019-09-27 RX ORDER — SULFAMETHOXAZOLE/TRIMETHOPRIM 800-160 MG
1 TABLET ORAL 2 TIMES DAILY
Qty: 14 TABLET | Refills: 0 | Status: SHIPPED | OUTPATIENT
Start: 2019-09-27 | End: 2019-11-11

## 2019-09-27 RX ORDER — CEPHALEXIN 500 MG/1
500 CAPSULE ORAL 4 TIMES DAILY
Qty: 28 CAPSULE | Refills: 0 | Status: SHIPPED | OUTPATIENT
Start: 2019-09-27 | End: 2019-11-11

## 2019-09-27 RX ADMIN — IBUPROFEN 600 MG: 600 TABLET, FILM COATED ORAL at 22:32

## 2019-09-27 ASSESSMENT — MIFFLIN-ST. JEOR: SCORE: 1527.11

## 2019-09-27 NOTE — ED AVS SNAPSHOT
[facility], [place], Emergency Department  [address] [place] [id number]  Phone:  [phone]                                    Andrew Lockwood   MRN: [id number]    Department:  [facility], Emergency Department   Date of Visit:  9/27/2019           After Visit Summary Signature Page    I have received my discharge instructions, and my questions have been answered. I have discussed any challenges I see with this plan with the nurse or doctor.    ..........................................................................................................................................  Patient/Patient Representative Signature      ..........................................................................................................................................  Patient Representative Print Name and Relationship to Patient    ..................................................               ................................................  Date                                   Time    ..........................................................................................................................................  Reviewed by Signature/Title    ...................................................              ..............................................  Date                                               Time          22EPIC Rev 08/18

## 2019-09-28 ENCOUNTER — HOSPITAL ENCOUNTER (EMERGENCY)
Facility: CLINIC | Age: 54
Discharge: HOME | End: 2019-09-28
Attending: EMERGENCY MEDICINE | Admitting: EMERGENCY MEDICINE
Payer: COMMERCIAL

## 2019-09-28 VITALS
DIASTOLIC BLOOD PRESSURE: 78 MMHG | RESPIRATION RATE: 18 BRPM | BODY MASS INDEX: 29.88 KG/M2 | SYSTOLIC BLOOD PRESSURE: 135 MMHG | HEART RATE: 98 BPM | HEIGHT: 64 IN | OXYGEN SATURATION: 95 % | WEIGHT: 175 LBS | TEMPERATURE: 98.8 F

## 2019-09-28 DIAGNOSIS — L02.31 ABSCESS OF BUTTOCK, RIGHT: ICD-10-CM

## 2019-09-28 PROCEDURE — 99283 EMERGENCY DEPT VISIT LOW MDM: CPT | Performed by: EMERGENCY MEDICINE

## 2019-09-28 PROCEDURE — 25000132 ZZH RX MED GY IP 250 OP 250 PS 637: Performed by: EMERGENCY MEDICINE

## 2019-09-28 PROCEDURE — 99284 EMERGENCY DEPT VISIT MOD MDM: CPT | Mod: Z6 | Performed by: EMERGENCY MEDICINE

## 2019-09-28 RX ORDER — SULFAMETHOXAZOLE/TRIMETHOPRIM 800-160 MG
2 TABLET ORAL ONCE
Status: COMPLETED | OUTPATIENT
Start: 2019-09-28 | End: 2019-09-28

## 2019-09-28 RX ADMIN — SULFAMETHOXAZOLE AND TRIMETHOPRIM 2 TABLET: 800; 160 TABLET ORAL at 19:20

## 2019-09-28 ASSESSMENT — MIFFLIN-ST. JEOR: SCORE: 1549.79

## 2019-09-28 ASSESSMENT — ENCOUNTER SYMPTOMS
FEVER: 0
WOUND: 1

## 2019-09-28 NOTE — ED AVS SNAPSHOT
[facility], [place], Emergency Department  [address] [place] [id number]  Phone:  [phone]                                    Andrew Lockwood   MRN: [id number]    Department:  [facility], Emergency Department   Date of Visit:  9/28/2019           After Visit Summary Signature Page    I have received my discharge instructions, and my questions have been answered. I have discussed any challenges I see with this plan with the nurse or doctor.    ..........................................................................................................................................  Patient/Patient Representative Signature      ..........................................................................................................................................  Patient Representative Print Name and Relationship to Patient    ..................................................               ................................................  Date                                   Time    ..........................................................................................................................................  Reviewed by Signature/Title    ...................................................              ..............................................  Date                                               Time          22EPIC Rev 08/18

## 2019-09-28 NOTE — ED TRIAGE NOTES
Patient with wound to buttocks, seen in this ED yesterday. Concerned that the wound may be infected because he is a diabetic.

## 2019-09-28 NOTE — ED TRIAGE NOTES
Arrived via triage, complains of pain on his right buttock. Looks red and a little swollen. Doesn't itch.

## 2019-09-29 NOTE — ED PROVIDER NOTES
"  History     Chief Complaint   Patient presents with     Wound Check     HPI  Andrew Lockwood is a 51-year-old male well-known to the emergency department he was seen here yesterday had I&D performed of a right buttock abscess he comes back saying it is \"bleeding\".  Unfortunately he did not get his prescriptions filled because he has no money.  He is not systemically ill.    I have reviewed the Medications, Allergies, Past Medical and Surgical History, and Social History in the [facility] system.    Past Medical History:   Diagnosis Date     AIDS (H)      Allergic rhinitis due to other allergen     DNS     Chronic abdominal pain      CNS toxoplasmosis (H)      Diabetes type 2, controlled (H)      GERD (gastroesophageal reflux disease)      HIV (human immunodeficiency virus infection) (H)      Periungual wart      Sleep apnea     doesn't use CPAP     Past Surgical History:   Procedure Laterality Date     C NONSPECIFIC PROCEDURE      right forearm fracture     COLONOSCOPY Left 1/22/2016    Procedure: COMBINED COLONOSCOPY, SINGLE OR MULTIPLE BIOPSY/POLYPECTOMY BY BIOPSY;  Surgeon: Clark Saini MD;  [facility]     HC EXPLORE UNDESC TESTIS,INGUIN/SCROTAL       LAPAROSCOPIC APPENDECTOMY N/A 1/31/2018    Procedure: LAPAROSCOPIC APPENDECTOMY;  LAPAROSCOPIC APPENDECTOMY;  Surgeon: Dawn Holt MD;  [facility]     LAPAROSCOPY DIAGNOSTIC (GENERAL) N/A 7/26/2016    Procedure: LAPAROSCOPY DIAGNOSTIC (GENERAL);  Surgeon: Susannah Arriaga MD;  [facility]     LAPAROSCOPY DIAGNOSTIC (GENERAL) N/A 4/16/2018    Procedure: LAPAROSCOPY DIAGNOSTIC (GENERAL);  Diagnostic laparoscopy and lysis of adhesions;  Surgeon: Prince Dowling MD;  [facility]     OPTICAL TRACKING SYSTEM CRANIOTOMY, EXCISE TUMOR, COMBINED Left 4/10/2015    Procedure: COMBINED OPTICAL TRACKING SYSTEM CRANIOTOMY, EXCISE TUMOR;  Surgeon: Mirlande Colmenares MD;  [facility]     REPAIR GAMEKEEPER'S THUMB Right 12/2/2016 "    Procedure: REPAIR LIGAMENT ULNAR COLLATERAL THUMB (GAMEKEEPER'S);  Surgeon: Evin Zamorano MD;  [facility]     Family History   Problem Relation Age of Onset     Diabetes Brother      Diabetes Father      Alzheimer Disease Father      Unknown/Adopted Mother      Diabetes Paternal Grandfather      Cancer No family hx of         no skin cancer     Skin Cancer No family hx of         no famiy hx of skin cancer     Glaucoma No family hx of      Macular Degeneration No family hx of      Social History     Tobacco Use     Smoking status: Current Some Day Smoker     Packs/day: 0.25     Types: Cigarettes     Last attempt to quit: 10/28/2016     Years since quittin.9     Smokeless tobacco: Former User   Substance Use Topics     Alcohol use: No     Alcohol/week: 0.0 standard drinks     Comment: Last etoh in      Current Facility-Administered Medications   Medication     lidocaine (PF) (XYLOCAINE) 1 % injection 4 mL     triamcinolone (KENALOG-40) injection 40 mg     Current Outpatient Medications   Medication     bictegravir-emtricitabine-tenofovir (BIKTARVY) -25 MG per tablet     blood glucose (NO BRAND SPECIFIED) lancets standard     blood glucose (NO BRAND SPECIFIED) test strip     blood glucose monitoring (NO BRAND SPECIFIED) meter device kit     cephALEXin (KEFLEX) 500 MG capsule     HUMALOG KWIKPEN 100 UNIT/ML soln     insulin glargine (BASAGLAR KWIKPEN) 100 UNIT/ML pen     insulin glargine (BASAGLAR KWIKPEN) 100 UNIT/ML pen     insulin pen needle (B-D U/F) 31G X 8 MM miscellaneous     insulin pen needle (BD PEN NEEDLE SCOTT 2ND GEN) 32G X 4 MM miscellaneous     naproxen (NAPROSYN) 250 MG tablet     naproxen (NAPROSYN) 500 MG tablet     NOVOLOG FLEXPEN 100 UNIT/ML soln     omeprazole (PRILOSEC) 20 MG CR capsule     omeprazole (PRILOSEC) 20 MG DR capsule     ranitidine (ZANTAC) 150 MG tablet     sulfamethoxazole-trimethoprim (BACTRIM DS) 800-160 MG tablet     Allergies   Allergen Reactions      "Metformin      Abdominal pain     Milk [Lac Bovis] Hives     Tylenol [Acetaminophen] Itching     Dulaglutide Rash      Review of Systems   Constitutional: Negative for fever.   Skin: Positive for wound (abscess on right buttock).   All other systems reviewed and are negative.    Physical Exam   BP: (!) 144/73  Pulse: 111  Temp: 98.4  F (36.9  C)  Resp: 16  Height: 162.6 cm (5' 4\")  Weight: 79.4 kg (175 lb)  SpO2: 98 %    Physical Exam  Vitals signs and nursing note reviewed.   Constitutional:       Appearance: He is not ill-appearing.   Skin:     Findings: Abscess present.             Comments: As noted an area of about 2 cm of induration that I am able to express purulence, there is also surrounding area of cellulitis approximately 5 cm in diameter.   Psychiatric:         Mood and Affect: Mood normal.         ED Course        Procedures        Results for orders placed or performed during the hospital encounter of 09/20/19   CBC with platelets differential   Result Value Ref Range    WBC 5.9 4.0 - 11.0 10e9/L    RBC Count 4.27 (L) 4.4 - 5.9 10e12/L    Hemoglobin 13.1 (L) 13.3 - 17.7 g/dL    Hematocrit 39.1 (L) 40.0 - 53.0 %    MCV 92 78 - 100 fl    MCH 30.7 26.5 - 33.0 pg    MCHC 33.5 31.5 - 36.5 g/dL    RDW 12.7 10.0 - 15.0 %    Platelet Count 293 150 - 450 10e9/L    Diff Method Manual Differential     % Neutrophils 49.6 %    % Lymphocytes 43.5 %    % Monocytes 5.2 %    % Eosinophils 1.7 %    % Basophils 0.0 %    Absolute Neutrophil 2.9 1.6 - 8.3 10e9/L    Absolute Lymphocytes 2.6 0.8 - 5.3 10e9/L    Absolute Monocytes 0.3 0.0 - 1.3 10e9/L    Absolute Eosinophils 0.1 0.0 - 0.7 10e9/L    Absolute Basophils 0.0 0.0 - 0.2 10e9/L    RBC Morphology Normal     Platelet Estimate Confirming automated cell count    Comprehensive metabolic panel   Result Value Ref Range    Sodium 135 133 - 144 mmol/L    Potassium 4.0 3.4 - 5.3 mmol/L    Chloride 100 94 - 109 mmol/L    Carbon Dioxide 24 20 - 32 mmol/L    Anion Gap 10 3 - 14 " mmol/L    Glucose 443 (H) 70 - 99 mg/dL    Urea Nitrogen 11 7 - 30 mg/dL    Creatinine 0.73 0.66 - 1.25 mg/dL    GFR Estimate >90 >60 mL/min/[1.73_m2]    GFR Estimate If Black >90 >60 mL/min/[1.73_m2]    Calcium 9.0 8.5 - 10.1 mg/dL    Bilirubin Total 0.6 0.2 - 1.3 mg/dL    Albumin 3.4 3.4 - 5.0 g/dL    Protein Total 7.0 6.8 - 8.8 g/dL    Alkaline Phosphatase 163 (H) 40 - 150 U/L    ALT 24 0 - 70 U/L    AST 16 0 - 45 U/L   Lactic acid whole blood   Result Value Ref Range    Lactic Acid 2.6 (H) 0.7 - 2.0 mmol/L   Magnesium   Result Value Ref Range    Magnesium 2.1 1.6 - 2.3 mg/dL   Phosphorus   Result Value Ref Range    Phosphorus 3.5 2.5 - 4.5 mg/dL   Calcium ionized   Result Value Ref Range    Calcium Ionized Whole Blood 4.7 4.4 - 5.2 mg/dL   Glucose by meter   Result Value Ref Range    Glucose 386 (H) 70 - 99 mg/dL   Lactic acid whole blood   Result Value Ref Range    Lactic Acid 1.2 0.7 - 2.0 mmol/L   Ketone Beta-Hydroxybutyrate Quantitative   Result Value Ref Range    Ketone Quantitative 0.0 0.0 - 0.6 mmol/L   ISTAT gases lactate eriberto POCT   Result Value Ref Range    Ph Venous 7.38 7.32 - 7.43 pH    PCO2 Venous 45 40 - 50 mm Hg    PO2 Venous 56 (H) 25 - 47 mm Hg    Bicarbonate Venous 26 21 - 28 mmol/L    O2 Sat Venous 88 %    Lactic Acid 2.2 (H) 0.7 - 2.1 mmol/L     *Note: Due to a large number of results and/or encounters for the requested time period, some results have not been displayed. A complete set of results can be found in Results Review.     Medications   sulfamethoxazole-trimethoprim (BACTRIM DS/SEPTRA DS) 800-160 MG per tablet 2 tablet (2 tablets Oral Given 9/28/19 1920)            Labs Ordered and Resulted from Time of ED Arrival Up to the Time of Departure from the ED - No data to display         Assessments & Plan (with Medical Decision Making)   51-year-old male with persistent right buttock abscess that is still draining.  There is surrounding erythema antibiotics are appropriate he had a  prescription yesterday but did not get it filled.  I gave him 2 tabs of Bactrim here and will encourage him to get the prescription filled and start them as soon as possible.  I explained that it is good that it is draining this is not a reason to come back this is what we want it to do.  He should follow-up with his primary care clinic.    I have reviewed the nursing notes.    I have reviewed the findings, diagnosis, plan and need for follow up with the patient.    New Prescriptions    No medications on file       Final diagnoses:   Abscess of buttock, right   I, Christiano Velazquez, am serving as a trained medical scribe to document services personally performed by Mick Lehman MD, based on the provider's statements to me.   I, Mick Lehman MD, was physically present and have reviewed and verified the accuracy of this note documented by Christiano Velazquez.     9/28/2019   [facility], [place], EMERGENCY DEPARTMENT     Thanh Lehman MD  09/28/19 1921       Thanh Lehman MD  09/28/19 1954

## 2019-09-29 NOTE — DISCHARGE INSTRUCTIONS
We want this area to drain blood and pus  You need to be on the antibiotics  Try to get the prescriptions filled and follow-up in your clinic next week

## 2019-09-29 NOTE — ED PROVIDER NOTES
History     Chief Complaint   Patient presents with     Insect Bite     HPI  Andrew Lockwood is a 51 year old male who presents with pain in the right buttock.  He states that he developed pain and swelling to the right buttock over the last 2 days.  He has not noted any discharge.  He has no fever or chills.  He wonders if an insect bit him.  He has had incision and drainage of an abscess in the past but is unsure if it was MRSA.      PAST MEDICAL HISTORY:   Past Medical History:   Diagnosis Date     AIDS (H)      Allergic rhinitis due to other allergen     DNS     Chronic abdominal pain      CNS toxoplasmosis (H)      Diabetes type 2, controlled (H)      GERD (gastroesophageal reflux disease)      HIV (human immunodeficiency virus infection) (H)      Periungual wart      Sleep apnea     doesn't use CPAP       PAST SURGICAL HISTORY:   Past Surgical History:   Procedure Laterality Date     C NONSPECIFIC PROCEDURE      right forearm fracture     COLONOSCOPY Left 1/22/2016    Procedure: COMBINED COLONOSCOPY, SINGLE OR MULTIPLE BIOPSY/POLYPECTOMY BY BIOPSY;  Surgeon: Clark Saini MD;  [facility]     HC EXPLORE UNDESC TESTIS,INGUIN/SCROTAL       LAPAROSCOPIC APPENDECTOMY N/A 1/31/2018    Procedure: LAPAROSCOPIC APPENDECTOMY;  LAPAROSCOPIC APPENDECTOMY;  Surgeon: Dawn Holt MD;  [facility]     LAPAROSCOPY DIAGNOSTIC (GENERAL) N/A 7/26/2016    Procedure: LAPAROSCOPY DIAGNOSTIC (GENERAL);  Surgeon: Susannah Arriaga MD;  [facility]     LAPAROSCOPY DIAGNOSTIC (GENERAL) N/A 4/16/2018    Procedure: LAPAROSCOPY DIAGNOSTIC (GENERAL);  Diagnostic laparoscopy and lysis of adhesions;  Surgeon: Prince Dowling MD;  [facility]     OPTICAL TRACKING SYSTEM CRANIOTOMY, EXCISE TUMOR, COMBINED Left 4/10/2015    Procedure: COMBINED OPTICAL TRACKING SYSTEM CRANIOTOMY, EXCISE TUMOR;  Surgeon: Mirlande Colmenares MD;  [facility]     REPAIR GAMEKEEPER'S THUMB Right 12/2/2016     Procedure: REPAIR LIGAMENT ULNAR COLLATERAL THUMB (GAMEKEEPER'S);  Surgeon: Evin Zamorano MD;  Location:  OR       FAMILY HISTORY:   Family History   Problem Relation Age of Onset     Diabetes Brother      Diabetes Father      Alzheimer Disease Father      Unknown/Adopted Mother      Diabetes Paternal Grandfather      Cancer No family hx of         no skin cancer     Skin Cancer No family hx of         no famiy hx of skin cancer     Glaucoma No family hx of      Macular Degeneration No family hx of        SOCIAL HISTORY:   Social History     Tobacco Use     Smoking status: Current Some Day Smoker     Packs/day: 0.25     Types: Cigarettes     Last attempt to quit: 10/28/2016     Years since quittin.9     Smokeless tobacco: Former User   Substance Use Topics     Alcohol use: No     Alcohol/week: 0.0 standard drinks     Comment: Last etoh in        Discharge Medication List as of 2019 10:29 PM      START taking these medications    Details   cephALEXin (KEFLEX) 500 MG capsule Take 1 capsule (500 mg) by mouth 4 times daily for 7 days, Disp-28 capsule, R-0, Local Print         CONTINUE these medications which have CHANGED    Details   sulfamethoxazole-trimethoprim (BACTRIM DS) 800-160 MG tablet Take 1 tablet by mouth 2 times daily for 7 days, Disp-14 tablet, R-0, Local Print         CONTINUE these medications which have NOT CHANGED    Details   bictegravir-emtricitabine-tenofovir (BIKTARVY) -25 MG per tablet Take 1 tablet by mouth daily, Disp-30 tablet, R-11, E-Prescribe      blood glucose (NO BRAND SPECIFIED) lancets standard Use to test blood sugar four times daily or as directed.Disp-100 each, [id number] provide lancets that are covered by insurance.      blood glucose (NO BRAND SPECIFIED) test strip 1 strip by In Vitro route 4 times daily (before meals and nightly) Use to test blood sugars 4 times daily or as directed, Disp-100 strip, R-11, E-PrescribePlease provide test  strips that are covered by insurance.      blood glucose monitoring (NO BRAND SPECIFIED) meter device kit Use to test blood sugar 4 times daily or as directed.Disp-1 kit, [id number] provide meter that is covered by insurance.      HUMALOG KWIKPEN 100 UNIT/ML soln Inject 5 units with breakfast, lunch and dinner daily., Disp-15 mL, R-3, OLESYA, E-Prescribe      !! insulin glargine (BASAGLAR KWIKPEN) 100 UNIT/ML pen Inject 40 units subcutaneous each am and 40units each pm, Disp-15 mL, R-11, E-Prescribe      !! insulin glargine (BASAGLAR KWIKPEN) 100 UNIT/ML pen Inject 40 units subcutaneous each am and each pm., Disp-15 mL, R-3, Historical      !! insulin pen needle (B-D U/F) 31G X 8 MM miscellaneous Use 1 pen needles daily or as directed.Disp-100 each, [id number]      !! insulin pen needle (BD PEN NEEDLE SCOTT 2ND GEN) 32G X 4 MM miscellaneous Use 3-4 daily.Disp-250 each, [id number]      !! naproxen (NAPROSYN) 250 MG tablet Take 1 tablet (250 mg) by mouth 2 times daily as needed for moderate pain (with meals), Disp-14 tablet, R-0, Local Print      !! naproxen (NAPROSYN) 500 MG tablet Take 1 tablet (500 mg) by mouth 2 times daily as needed for moderate pain Take with meals., Disp-30 tablet, R-0, Local Print      NOVOLOG FLEXPEN 100 UNIT/ML soln Inject 5 units with breakfast, lunch and dinner daily., Disp-15 mL, R-3, OLESYA, E-PrescribePlease dispense as Novolog Flexpen 100 unit/mL      !! omeprazole (PRILOSEC) 20 MG CR capsule Take 1 capsule (20 mg) by mouth 2 times daily, Disp-180 capsule, R-3, E-Prescribe      !! omeprazole (PRILOSEC) 20 MG DR capsule Take 1 capsule (20 mg) by mouth daily, Disp-30 capsule, R-0, Local Print      ranitidine (ZANTAC) 150 MG tablet Take 1 tablet (150 mg) by mouth 2 times daily, Disp-60 tablet, R-11, E-Prescribe       !! - Potential duplicate medications found. Please discuss with provider.      STOP taking these medications       amoxicillin (AMOXIL) 500 MG capsule Comments:  "  Reason for Stopping:                  Allergies   Allergen Reactions     Metformin      Abdominal pain     Milk [Lac Bovis] Hives     Tylenol [Acetaminophen] Itching     Dulaglutide Rash         I have reviewed the Medications, Allergies, Past Medical and Surgical History, and Social History in the Epic system.    Review of Systems   All other systems reviewed and are negative.      Physical Exam   BP: (!) 146/91  Pulse: 89  Heart Rate: 98  Temp: 98.8  F (37.1  C)  Resp: 16  Height: 162.6 cm (5' 4\")  Weight: 77.1 kg (170 lb)  SpO2: 100 %      Physical Exam  Vitals signs and nursing note reviewed.   Constitutional:       General: He is not in acute distress.     Appearance: He is not diaphoretic.   HENT:      Head: Normocephalic and atraumatic.   Neck:      Musculoskeletal: Normal range of motion and neck supple.   Cardiovascular:      Rate and Rhythm: Normal rate.   Pulmonary:      Effort: Pulmonary effort is normal.   Abdominal:      Palpations: Abdomen is soft.   Musculoskeletal: Normal range of motion.   Skin:     General: Skin is warm and dry.      Comments: 4cm round area of erythema and tenderness, no fluctuance   Neurological:      Mental Status: He is alert and oriented to person, place, and time.      Sensory: No sensory deficit.   Psychiatric:         Mood and Affect: Mood normal.         Thought Content: Thought content normal.         Judgement: Judgment normal.         ED Course        Procedures             Critical Care time:  none       Procedure: Incision and Drainage   LOCATIONS:  Right buttock     ANESTHESIA:  Local field block using Lidocaine 1% plain, total of 2 mLs     PREPARATION:  Cleansed with Betadine     PROCEDURE:  Area was incised with # 11 Blade ([place]) with a Single Straight incision.  Wound treatment included Purulent Drainage.  Packing consisted of No Packing.  Appropriate dressing was applied to cover the area.    Patient Status:        Patient tolerated the procedure well. " There were no complications.                    Labs Ordered and Resulted from Time of ED Arrival Up to the Time of Departure from the ED - No data to display         Assessments & Plan (with Medical Decision Making)   This is a 50 yo M with a history of AIDS who presents with a right buttock abscess that was incised and had drainage of purulent material.  The patient did not appear toxic but there was an overlying area of erythema.  He was prescribed Keflex and Bactrim and will follow up with his PCP.     I have reviewed the nursing notes.    I have reviewed the findings, diagnosis, plan and need for follow up with the patient.    Discharge Medication List as of 9/27/2019 10:29 PM      START taking these medications    Details   cephALEXin (KEFLEX) 500 MG capsule Take 1 capsule (500 mg) by mouth 4 times daily for 7 days, Disp-28 capsule, R-0, Local Print             Final diagnoses:   Abscess of buttock       9/27/2019   [facility], [place], EMERGENCY DEPARTMENT     Angelo Estes MD  09/28/19 [id number]

## 2019-10-04 ENCOUNTER — PRE VISIT (OUTPATIENT)
Dept: SURGERY | Facility: CLINIC | Age: 54
End: 2019-10-04

## 2019-10-04 NOTE — TELEPHONE ENCOUNTER
FUTURE VISIT INFORMATION      SURGERY INFORMATION:    Date: 10/11/19    Location:  OR    Surgeon:  Milena Lundberg    Anesthesia Type:  MAC    RECORDS REQUESTED FROM:       Primary Care Provider: Caitie Lamb MD- [facility]    Pertinent Medical History: Pulmonary nodules    Most recent EKG+ Tracin19    Most recent Cardiac Stress Test:    Most recent Sleep Study:  10/25/12

## 2019-10-07 ENCOUNTER — TELEPHONE (OUTPATIENT)
Dept: SURGERY | Facility: CLINIC | Age: 54
End: 2019-10-07

## 2019-10-07 ENCOUNTER — HOSPITAL ENCOUNTER (OUTPATIENT)
Facility: CLINIC | Age: 54
End: 2019-10-07
Attending: COLON & RECTAL SURGERY | Admitting: COLON & RECTAL SURGERY
Payer: COMMERCIAL

## 2019-10-11 ENCOUNTER — HOSPITAL ENCOUNTER (EMERGENCY)
Facility: CLINIC | Age: 54
Discharge: HOME | End: 2019-10-11
Attending: EMERGENCY MEDICINE | Admitting: EMERGENCY MEDICINE
Payer: COMMERCIAL

## 2019-10-11 ENCOUNTER — APPOINTMENT (OUTPATIENT)
Dept: GENERAL RADIOLOGY | Facility: CLINIC | Age: 54
End: 2019-10-11
Attending: EMERGENCY MEDICINE
Payer: COMMERCIAL

## 2019-10-11 VITALS
WEIGHT: 170 LBS | RESPIRATION RATE: 18 BRPM | OXYGEN SATURATION: 97 % | HEIGHT: 64 IN | BODY MASS INDEX: 29.02 KG/M2 | HEART RATE: 116 BPM | DIASTOLIC BLOOD PRESSURE: 87 MMHG | TEMPERATURE: 98.3 F | SYSTOLIC BLOOD PRESSURE: 122 MMHG

## 2019-10-11 DIAGNOSIS — K29.50 CHRONIC GASTRITIS WITHOUT BLEEDING, UNSPECIFIED GASTRITIS TYPE: ICD-10-CM

## 2019-10-11 LAB
ALBUMIN SERPL-MCNC: 3.3 G/DL (ref 3.4–5)
ALP SERPL-CCNC: 124 U/L (ref 40–150)
ALT SERPL W P-5'-P-CCNC: 21 U/L (ref 0–70)
ANION GAP SERPL CALCULATED.3IONS-SCNC: 9 MMOL/L (ref 3–14)
APTT PPP: 35 SEC (ref 22–37)
AST SERPL W P-5'-P-CCNC: 17 U/L (ref 0–45)
BASOPHILS # BLD AUTO: 0.1 10E9/L (ref 0–0.2)
BASOPHILS NFR BLD AUTO: 0.6 %
BILIRUB SERPL-MCNC: 0.4 MG/DL (ref 0.2–1.3)
BUN SERPL-MCNC: 16 MG/DL (ref 7–30)
CALCIUM SERPL-MCNC: 8.6 MG/DL (ref 8.5–10.1)
CHLORIDE SERPL-SCNC: 99 MMOL/L (ref 94–109)
CO2 SERPL-SCNC: 26 MMOL/L (ref 20–32)
CREAT SERPL-MCNC: 0.71 MG/DL (ref 0.66–1.25)
DIFFERENTIAL METHOD BLD: NORMAL
EOSINOPHIL # BLD AUTO: 0.3 10E9/L (ref 0–0.7)
EOSINOPHIL NFR BLD AUTO: 3.4 %
ERYTHROCYTE [DISTWIDTH] IN BLOOD BY AUTOMATED COUNT: 12.1 % (ref 10–15)
GFR SERPL CREATININE-BSD FRML MDRD: >90 ML/MIN/{1.73_M2}
GLUCOSE SERPL-MCNC: 274 MG/DL (ref 70–99)
HCT VFR BLD AUTO: 40.4 % (ref 40–53)
HGB BLD-MCNC: 14 G/DL (ref 13.3–17.7)
IMM GRANULOCYTES # BLD: 0.1 10E9/L (ref 0–0.4)
IMM GRANULOCYTES NFR BLD: 1.2 %
INR PPP: 1.03 (ref 0.86–1.14)
INTERPRETATION ECG - MUSE: NORMAL
LACTATE BLD-SCNC: 1.2 MMOL/L (ref 0.7–2)
LIPASE SERPL-CCNC: 180 U/L (ref 73–393)
LYMPHOCYTES # BLD AUTO: 3.1 10E9/L (ref 0.8–5.3)
LYMPHOCYTES NFR BLD AUTO: 39.5 %
MCH RBC QN AUTO: 31 PG (ref 26.5–33)
MCHC RBC AUTO-ENTMCNC: 34.7 G/DL (ref 31.5–36.5)
MCV RBC AUTO: 89 FL (ref 78–100)
MONOCYTES # BLD AUTO: 0.5 10E9/L (ref 0–1.3)
MONOCYTES NFR BLD AUTO: 6.7 %
NEUTROPHILS # BLD AUTO: 3.8 10E9/L (ref 1.6–8.3)
NEUTROPHILS NFR BLD AUTO: 48.6 %
NRBC # BLD AUTO: 0 10*3/UL
NRBC BLD AUTO-RTO: 0 /100
PLATELET # BLD AUTO: 340 10E9/L (ref 150–450)
POTASSIUM SERPL-SCNC: 3.6 MMOL/L (ref 3.4–5.3)
PROT SERPL-MCNC: 7 G/DL (ref 6.8–8.8)
RBC # BLD AUTO: 4.52 10E12/L (ref 4.4–5.9)
SODIUM SERPL-SCNC: 133 MMOL/L (ref 133–144)
TROPONIN I SERPL-MCNC: <0.015 UG/L (ref 0–0.04)
WBC # BLD AUTO: 7.7 10E9/L (ref 4–11)

## 2019-10-11 PROCEDURE — C9113 INJ PANTOPRAZOLE SODIUM, VIA: HCPCS | Performed by: EMERGENCY MEDICINE

## 2019-10-11 PROCEDURE — 99285 EMERGENCY DEPT VISIT HI MDM: CPT | Mod: 25 | Performed by: EMERGENCY MEDICINE

## 2019-10-11 PROCEDURE — 93005 ELECTROCARDIOGRAM TRACING: CPT | Performed by: EMERGENCY MEDICINE

## 2019-10-11 PROCEDURE — 85730 THROMBOPLASTIN TIME PARTIAL: CPT | Performed by: EMERGENCY MEDICINE

## 2019-10-11 PROCEDURE — 93010 ELECTROCARDIOGRAM REPORT: CPT | Mod: Z6 | Performed by: EMERGENCY MEDICINE

## 2019-10-11 PROCEDURE — 85025 COMPLETE CBC W/AUTO DIFF WBC: CPT | Performed by: EMERGENCY MEDICINE

## 2019-10-11 PROCEDURE — 85610 PROTHROMBIN TIME: CPT | Performed by: EMERGENCY MEDICINE

## 2019-10-11 PROCEDURE — 84484 ASSAY OF TROPONIN QUANT: CPT | Performed by: EMERGENCY MEDICINE

## 2019-10-11 PROCEDURE — 25000128 H RX IP 250 OP 636: Performed by: EMERGENCY MEDICINE

## 2019-10-11 PROCEDURE — 71046 X-RAY EXAM CHEST 2 VIEWS: CPT

## 2019-10-11 PROCEDURE — 83690 ASSAY OF LIPASE: CPT | Performed by: EMERGENCY MEDICINE

## 2019-10-11 PROCEDURE — 80053 COMPREHEN METABOLIC PANEL: CPT | Performed by: EMERGENCY MEDICINE

## 2019-10-11 PROCEDURE — 25000125 ZZHC RX 250: Performed by: EMERGENCY MEDICINE

## 2019-10-11 PROCEDURE — 96374 THER/PROPH/DIAG INJ IV PUSH: CPT | Performed by: EMERGENCY MEDICINE

## 2019-10-11 PROCEDURE — 25000132 ZZH RX MED GY IP 250 OP 250 PS 637: Performed by: EMERGENCY MEDICINE

## 2019-10-11 PROCEDURE — 83605 ASSAY OF LACTIC ACID: CPT | Performed by: EMERGENCY MEDICINE

## 2019-10-11 PROCEDURE — 96361 HYDRATE IV INFUSION ADD-ON: CPT | Performed by: EMERGENCY MEDICINE

## 2019-10-11 RX ADMIN — LIDOCAINE HYDROCHLORIDE 30 ML: 20 SOLUTION ORAL; TOPICAL at 04:28

## 2019-10-11 RX ADMIN — PANTOPRAZOLE SODIUM 40 MG: 40 INJECTION, POWDER, FOR SOLUTION INTRAVENOUS at 04:28

## 2019-10-11 RX ADMIN — SODIUM CHLORIDE 1000 ML: 900 INJECTION, SOLUTION INTRAVENOUS at 03:08

## 2019-10-11 ASSESSMENT — MIFFLIN-ST. JEOR: SCORE: 1527.11

## 2019-10-11 NOTE — DISCHARGE INSTRUCTIONS
Please make an appointment to follow up with Your Primary Care Provider in 3 days and your colorectal surgeon as scheduled.     Return to the ED if you develop worsening pain, shortness of breath, fevers, blood in the vomit or stool, chest pain, or any new or worsening concerns.

## 2019-10-11 NOTE — ED TRIAGE NOTES
Pt arrived to the ER With c/o chest pain which started around 1900 last night. Pt states that he has it intermittently.pt states that he did notice streaks of bloodX1 in his BM last night. VSS and afebrile.

## 2019-10-11 NOTE — ED AVS SNAPSHOT
[facility], [place], Emergency Department  [address] [place] [id number]  Phone:  [phone]                                    Andrew Lockwood   MRN: [id number]    Department:  [facility], Emergency Department   Date of Visit:  10/11/2019           After Visit Summary Signature Page    I have received my discharge instructions, and my questions have been answered. I have discussed any challenges I see with this plan with the nurse or doctor.    ..........................................................................................................................................  Patient/Patient Representative Signature      ..........................................................................................................................................  Patient Representative Print Name and Relationship to Patient    ..................................................               ................................................  Date                                   Time    ..........................................................................................................................................  Reviewed by Signature/Title    ...................................................              ..............................................  Date                                               Time          22EPIC Rev 08/18

## 2019-10-16 ENCOUNTER — ANESTHESIA (OUTPATIENT)
Dept: SURGERY | Facility: CLINIC | Age: 54
End: 2019-10-16

## 2019-10-16 ENCOUNTER — ANESTHESIA EVENT (OUTPATIENT)
Dept: SURGERY | Facility: CLINIC | Age: 54
End: 2019-10-16

## 2019-10-16 NOTE — ANESTHESIA PREPROCEDURE EVALUATION
Anesthesia Pre-Procedure Evaluation    Patient: Andrew Lockwood   MRN:     [id number] Gender:   male   Age:    51 year old :      1967        Preoperative Diagnosis: Anal Dysplasia   Procedure(s):  Examination Under Anesthesia  High Resolution Anoscopy  Possible Fulguration, Possible Biopsy     Past Medical History:   Diagnosis Date     AIDS (H)      Allergic rhinitis due to other allergen     DNS     Chronic abdominal pain      CNS toxoplasmosis (H)      Diabetes type 2, controlled (H)      GERD (gastroesophageal reflux disease)      HIV (human immunodeficiency virus infection) (H)      Periungual wart      Sleep apnea     doesn't use CPAP      Past Surgical History:   Procedure Laterality Date     C NONSPECIFIC PROCEDURE      right forearm fracture     COLONOSCOPY Left 2016    Procedure: COMBINED COLONOSCOPY, SINGLE OR MULTIPLE BIOPSY/POLYPECTOMY BY BIOPSY;  Surgeon: Clark Saini MD;  Location: [facility]     HC EXPLORE UNDESC TESTIS,INGUIN/SCROTAL       LAPAROSCOPIC APPENDECTOMY N/A 2018    Procedure: LAPAROSCOPIC APPENDECTOMY;  LAPAROSCOPIC APPENDECTOMY;  Surgeon: Dawn Holt MD;  Location: [facility]     LAPAROSCOPY DIAGNOSTIC (GENERAL) N/A 2016    Procedure: LAPAROSCOPY DIAGNOSTIC (GENERAL);  Surgeon: Susannah Arriaga MD;  Location: [facility]     LAPAROSCOPY DIAGNOSTIC (GENERAL) N/A 2018    Procedure: LAPAROSCOPY DIAGNOSTIC (GENERAL);  Diagnostic laparoscopy and lysis of adhesions;  Surgeon: Prince Dowling MD;  Location: [facility]     OPTICAL TRACKING SYSTEM CRANIOTOMY, EXCISE TUMOR, COMBINED Left 4/10/2015    Procedure: COMBINED OPTICAL TRACKING SYSTEM CRANIOTOMY, EXCISE TUMOR;  Surgeon: Mirlande Colmenares MD;  Location: [facility]     REPAIR GAMEKEEPER'S THUMB Right 2016    Procedure: REPAIR LIGAMENT ULNAR COLLATERAL THUMB (GAMEKEEPER'S);  Surgeon: Evin Zamorano MD;  Location: [facility]          Anesthesia Evaluation     . Pt has had prior anesthetic. Type:  General    No history of anesthetic complications          ROS/MED HX    ENT/Pulmonary:     (+)sleep apnea, , . .    Neurologic:       Cardiovascular:         METS/Exercise Tolerance:     Hematologic:         Musculoskeletal:         GI/Hepatic:     (+) GERD       Renal/Genitourinary:         Endo:     (+) type II DM .      Psychiatric:         Infectious Disease:   (+) HIV,       Malignancy:         Other:                     [facility] AN PHYSICAL EXAM    LABS:  CBC:   Lab Results   Component Value Date    WBC 7.7 10/11/2019    WBC 5.9 09/20/2019    HGB 14.0 10/11/2019    HGB 13.1 (L) 09/20/2019    HCT 40.4 10/11/2019    HCT 39.1 (L) 09/20/2019     10/11/2019     09/20/2019     BMP:   Lab Results   Component Value Date     10/11/2019     09/20/2019    POTASSIUM 3.6 10/11/2019    POTASSIUM 4.0 09/20/2019    CHLORIDE 99 10/11/2019    CHLORIDE 100 09/20/2019    CO2 26 10/11/2019    CO2 24 09/20/2019    BUN 16 10/11/2019    BUN 11 09/20/2019    CR 0.71 10/11/2019    CR 0.73 09/20/2019     (H) 10/11/2019     (H) 09/20/2019     COAGS:   Lab Results   Component Value Date    PTT 35 10/11/2019    INR 1.03 10/11/2019     POC:   Lab Results   Component Value Date     (H) 09/20/2019     OTHER:   Lab Results   Component Value Date    PH 7.42 04/10/2015    LACT 1.2 10/11/2019    A1C 10.2 (H) 06/07/2019    LINDA 8.6 10/11/2019    PHOS 3.5 09/20/2019    MAG 2.1 09/20/2019    ALBUMIN 3.3 (L) 10/11/2019    PROTTOTAL 7.0 10/11/2019    ALT 21 10/11/2019    AST 17 10/11/2019    ALKPHOS 124 10/11/2019    BILITOTAL 0.4 10/11/2019    LIPASE 180 10/11/2019    AMYLASE 75 01/24/2018    TSH 3.57 02/15/2018    T4 1.02 04/14/2015    CRP <2.9 10/08/2018    SED 23 (H) 10/05/2018        Preop Vitals    BP Readings from Last 3 Encounters:   10/11/19 122/87   09/28/19 135/78   09/27/19 (!) 122/92    Pulse Readings from Last 3 Encounters:   10/11/19 116   09/28/19 98   09/27/19 89      Resp Readings from  "Last 3 Encounters:   10/11/19 18   09/28/19 18   09/27/19 16    SpO2 Readings from Last 3 Encounters:   10/11/19 97%   09/28/19 95%   09/27/19 98%      Temp Readings from Last 1 Encounters:   10/11/19 36.8  C (98.3  F) (Oral)    Ht Readings from Last 1 Encounters:   10/11/19 1.626 m (5' 4\")      Wt Readings from Last 1 Encounters:   10/11/19 77.1 kg (170 lb)    Estimated body mass index is 29.18 kg/m  as calculated from the following:    Height as of 10/11/19: 1.626 m (5' 4\").    Weight as of 10/11/19: 77.1 kg (170 lb).     LDA:        CLAUDIO [facility] AN PLAN NO PONV RULE          Jose Madsen MD  "

## 2019-10-17 ENCOUNTER — OFFICE VISIT (OUTPATIENT)
Dept: INTERNAL MEDICINE | Facility: CLINIC | Age: 54
End: 2019-10-17
Payer: COMMERCIAL

## 2019-10-17 ENCOUNTER — TELEPHONE (OUTPATIENT)
Dept: INTERNAL MEDICINE | Facility: CLINIC | Age: 54
End: 2019-10-17

## 2019-10-17 VITALS
BODY MASS INDEX: 28.15 KG/M2 | WEIGHT: 164 LBS | HEART RATE: 120 BPM | OXYGEN SATURATION: 97 % | SYSTOLIC BLOOD PRESSURE: 137 MMHG | DIASTOLIC BLOOD PRESSURE: 89 MMHG

## 2019-10-17 DIAGNOSIS — B20 HUMAN IMMUNODEFICIENCY VIRUS (HIV) DISEASE (H): ICD-10-CM

## 2019-10-17 DIAGNOSIS — E11.628 TYPE 2 DIABETES MELLITUS WITH OTHER SKIN COMPLICATION, WITH LONG-TERM CURRENT USE OF INSULIN (H): ICD-10-CM

## 2019-10-17 DIAGNOSIS — L02.31 ABSCESS OF BUTTOCK, RIGHT: Primary | ICD-10-CM

## 2019-10-17 DIAGNOSIS — Z79.4 TYPE 2 DIABETES MELLITUS WITH OTHER SKIN COMPLICATION, WITH LONG-TERM CURRENT USE OF INSULIN (H): ICD-10-CM

## 2019-10-17 LAB — HBA1C MFR BLD: 10.4 % (ref 0–5.6)

## 2019-10-17 RX ORDER — LIDOCAINE HYDROCHLORIDE 10 MG/ML
1 INJECTION, SOLUTION EPIDURAL; INFILTRATION; INTRACAUDAL; PERINEURAL ONCE
Status: COMPLETED | OUTPATIENT
Start: 2019-10-17 | End: 2019-10-17

## 2019-10-17 RX ORDER — SULFAMETHOXAZOLE/TRIMETHOPRIM 800-160 MG
1 TABLET ORAL 2 TIMES DAILY
Qty: 14 TABLET | Refills: 0 | Status: ON HOLD | OUTPATIENT
Start: 2019-10-17 | End: 2019-11-12

## 2019-10-17 RX ADMIN — LIDOCAINE HYDROCHLORIDE 1 ML: 10 INJECTION, SOLUTION EPIDURAL; INFILTRATION; INTRACAUDAL; PERINEURAL at 10:30

## 2019-10-17 ASSESSMENT — PAIN SCALES - GENERAL: PAINLEVEL: WORST PAIN (10)

## 2019-10-17 NOTE — PROGRESS NOTES
[facility]  Primary Care Center   Kate Duffy MD  10/17/2019      Chief Complaint:   Recheck Medication       History of Present Illness:   Andrew Lockwood is a 51 year old male with a history of HIV/AIDS, Diabetes mellitus type 2, anxiety, and depression who presents alone for evaluation of a gluteal abscess. He has been to ER a number of times for gluteal abscesses, with his last visit on 9/28/19, and they normally drain the abscesses and give him a prescription for antibiotics. He was given 2 tabs of Bactrim and a prescription on 9/28, and reports he took what he was given, but the pain in his butt returned about a week ago.. He denies fever; his temperature is 99. today. He is currently homeless, but keeps the area as clean as he can, and takes a shower every two days. He has not seen his PCP Dr. Lamb in a while, and usually goes to the ED when he notices a new abscess.     Review of Systems:   A full 10-pt Review of Systems was performed, verified and is negative except as documented in the HPI.  All health questionnaires were reviewed, verified and relevant information documented above.      Active Medications:      chlorhexidine (HIBICLENS) 4 % liquid, Apply small amount to wash cloth and use to cleanse buttocks area during bathing 1-2 times per week, Disp: 118 mL, Rfl: 0     sulfamethoxazole-trimethoprim (BACTRIM DS/SEPTRA DS) 800-160 MG tablet, Take 1 tablet by mouth 2 times daily for 7 days, Disp: 14 tablet, Rfl: 0     bictegravir-emtricitabine-tenofovir (BIKTARVY) -25 MG per tablet, Take 1 tablet by mouth daily, Disp: 30 tablet, Rfl: 11     blood glucose (NO BRAND SPECIFIED) lancets standard, Use to test blood sugar four times daily or as directed., Disp: 100 each, Rfl: 11     blood glucose (NO BRAND SPECIFIED) test strip, 1 strip by In Vitro route 4 times daily (before meals and nightly) Use to test blood sugars 4 times daily or as directed, Disp: 100 strip, Rfl: 11     blood glucose  monitoring (NO BRAND SPECIFIED) meter device kit, Use to test blood sugar 4 times daily or as directed., Disp: 1 kit, Rfl: 0     HUMALOG KWIKPEN 100 UNIT/ML soln, Inject 5 units with breakfast, lunch and dinner daily., Disp: 15 mL, Rfl: 3     insulin glargine (BASAGLAR KWIKPEN) 100 UNIT/ML pen, Inject 40 units subcutaneous each am and 40units each pm, Disp: 15 mL, Rfl: 11     insulin glargine (BASAGLAR KWIKPEN) 100 UNIT/ML pen, Inject 40 units subcutaneous each am and each pm., Disp: 15 mL, Rfl: 3     insulin pen needle (B-D U/F) 31G X 8 MM miscellaneous, Use 1 pen needles daily or as directed., Disp: 100 each, Rfl: 3     insulin pen needle (BD PEN NEEDLE SCOTT 2ND GEN) 32G X 4 MM miscellaneous, Use 3-4 daily., Disp: 250 each, Rfl: 3     naproxen (NAPROSYN) 250 MG tablet, Take 1 tablet (250 mg) by mouth 2 times daily as needed for moderate pain (with meals), Disp: 14 tablet, Rfl: 0     naproxen (NAPROSYN) 500 MG tablet, Take 1 tablet (500 mg) by mouth 2 times daily as needed for moderate pain Take with meals., Disp: 30 tablet, Rfl: 0     NOVOLOG FLEXPEN 100 UNIT/ML soln, Inject 5 units with breakfast, lunch and dinner daily., Disp: 15 mL, Rfl: 3     omeprazole (PRILOSEC) 20 MG CR capsule, Take 1 capsule (20 mg) by mouth 2 times daily, Disp: 180 capsule, Rfl: 3     omeprazole (PRILOSEC) 20 MG DR capsule, Take 1 capsule (20 mg) by mouth daily for 14 days, Disp: 14 capsule, Rfl: 0     ranitidine (ZANTAC) 150 MG tablet, Take 1 tablet (150 mg) by mouth 2 times daily, Disp: 60 tablet, Rfl: 11    Current Facility-Administered Medications:      lidocaine (PF) (XYLOCAINE) 1 % injection 4 mL, 4 mL, , , Aguila Cannon A, DO, 4 mL at 06/08/19 1059     triamcinolone (KENALOG-40) injection 40 mg, 40 mg, , , Aguila Cannon A, DO, 40 mg at 06/08/19 1055      Allergies:   Metformin; Milk [lac bovis]; Tylenol [acetaminophen]; and Dulaglutide      Past Medical History:  AIDS  Aphthous ulcer  Allergic rhinitis  Abdominal  abscess  Appendicitis  Adynamic ileus  Brain lesion  Bowel obstruction   Chronic abdominal pain  Constipation  CNS toxoplasmosis  Depression  Diabetes mellitus type 2  Epidermal cyst  Erectile dysfunction  Gastroesophageal reflux disease  Generalized anxiety disorder  Folliculitis  Herpes zoster  Horseshoe tear of retina of left eye, without detachment  HIV  Insomnia  Periungual wart  Pulmonary nodules  Prurigo nodularis  Pneumonia  Rupture of ulnar collateral ligament of right thumb  Sleep apnea  Shoulder joint pain  Thrush     Past Surgical History:  Laparoscopic appendectomy - 1/31/18  Optical tracking system craniotomy, excise tumor, combined, left - 4/10/15  Repair gamekeeper's thumb, right - 12/2/16  Right forearm fracture    Family History:   Diabetes mellitus - brother, father, paternal grandfather   Alzheimer disease - father  Unknown / adopted - mother      Social History:   The patient is single, a current some day smoker (0.25 ppd), and does not consume alcohol. He has used marijuana in the past but does not currently.      Physical Exam:   /89 (BP Location: Right arm, Patient Position: Sitting, Cuff Size: Adult Regular)   Pulse 120   Wt 74.4 kg (164 lb)   SpO2 97%   BMI 28.15 kg/m     Constitutional: Alert, oriented, pleasant, no acute distress, non toxic appearing  Head: Normocephalic, atraumatic  Musculoskeletal: No edema, normal muscle tone, normal gait  Neurologic: Alert and oriented, cranial nerves 2-12 intact.  Skin: 2-3cm area of erythema and induration on his lower right buttocks  Psychiatric: normal mentation, affect and mood      Procedure Note - I&D  The R lower gluteal cleft was cleansed with chlorhexidine followed by betadine.  The area was numbed with 1% lidocaine, approx 1 ml.  A small incision was made with a 15 blade scalpel.  Scant purulence was expressed.  Minimal blood loss.  Patient tolerated well.  Area was cleansed and covered with gauze and tegaderm.  Wound care  instructions were given.     Assessment and Plan:  Abscess of buttock, right  Patient was seen in ED 3-4 weeks ago and had I&D of buttocks abscess followed by treatment with bactrim. He reports improvement in initial abscess, but subsequent development of a new abscess. The abscess today was fairly small but we did perform a small I&D in the office. He was given wound care instruction, and antibiotics were sent to his pharmacy. This is likely precipitated by poorly controlled diabetes. Given his current homelessness, he is at risk for developing ongoing wounds. I advised follow up with his HIV doctor and PCP for chonic disease management. In the interim, he could try bathing with Hibiclens to reduce skin sushma burden  - Hemoglobin A1c POCT  - chlorhexidine (HIBICLENS) 4 % liquid  Dispense: 118 mL; Refill: 0  - sulfamethoxazole-trimethoprim (BACTRIM DS/SEPTRA DS) 800-160 MG tablet  Dispense: 14 tablet; Refill: 0    Type 2 diabetes mellitus with other skin complication, with long-term current use of insulin (H)  - Hemoglobin A1c POCT  - chlorhexidine (HIBICLENS) 4 % liquid  Dispense: 118 mL; Refill: 0  - sulfamethoxazole-trimethoprim (BACTRIM DS/SEPTRA DS) 800-160 MG tablet  Dispense: 14 tablet; Refill: 0    Human immunodeficiency virus (HIV) disease (H)  - Hemoglobin A1c POCT  - chlorhexidine (HIBICLENS) 4 % liquid  Dispense: 118 mL; Refill: 0  - sulfamethoxazole-trimethoprim (BACTRIM DS/SEPTRA DS) 800-160 MG tablet  Dispense: 14 tablet; Refill: 0       Follow-up: No follow-ups on file.         Scribe Disclosure:  Stella KELLY, am serving as a scribe to document services personally performed by Kate Duffy MD at this visit, based upon the provider's statements to me. All documentation has been reviewed by the aforementioned provider prior to being entered into the official medical record.     Portions of this medical record were completed by a scribe. UPON MY REVIEW AND AUTHENTICATION BY ELECTRONIC  SIGNATURE, this confirms (a) I performed the applicable clinical services, and (b) the record is accurate.   Kate Duffy MD  Internal Medicine

## 2019-10-17 NOTE — PATIENT INSTRUCTIONS
[facility] Medication Refill Request Information:  * Please contact your pharmacy regarding ANY request for medication refills.  ** [facility] Prescription Fax = [phone]  * Please allow 3 business days for routine medication refills.  * Please allow 5 business days for controlled substance medication refills.     [facility] Test Result notification information:  *You will be notified with in 7-10 days of your appointment day regarding the results of your test.  If you are on MyChart you will be notified as soon as the provider has reviewed the results and signed off on them.    [facility]: [phone]

## 2019-10-17 NOTE — TELEPHONE ENCOUNTER
Dear Deb,  I saw Andrew today for buttocks abscess but his diabetes is not well-controlled given A1c of 10.  Can you advise that he try to check his BG in morning and 1-2 hours after meals and schedule a visit with Dr. Lamb or any provider?  Kate Duffy MD  Internal Medicine

## 2019-10-22 NOTE — TELEPHONE ENCOUNTER
On 10.10.2019, an email was sent to Andrew's email. This has been noted to be the best way to contact him as he does not have a reliable phone. I sent him an email noting that the date of his procedure was changed to 10.16.2019. In a separate email on the same day, I sent his instructions. I did not hear back from Andrew.     On 10.16.2019, Andrew did not show up for his procedure.

## 2019-10-23 ENCOUNTER — HOSPITAL ENCOUNTER (EMERGENCY)
Facility: CLINIC | Age: 54
Discharge: HOME | End: 2019-10-24
Attending: EMERGENCY MEDICINE | Admitting: EMERGENCY MEDICINE
Payer: COMMERCIAL

## 2019-10-23 DIAGNOSIS — M25.521 RIGHT ELBOW PAIN: ICD-10-CM

## 2019-10-23 DIAGNOSIS — M25.511 ACUTE PAIN OF RIGHT SHOULDER: ICD-10-CM

## 2019-10-23 DIAGNOSIS — M25.561 ACUTE PAIN OF RIGHT KNEE: ICD-10-CM

## 2019-10-23 DIAGNOSIS — R73.9 HYPERGLYCEMIA: ICD-10-CM

## 2019-10-23 PROCEDURE — 99284 EMERGENCY DEPT VISIT MOD MDM: CPT | Mod: Z6 | Performed by: EMERGENCY MEDICINE

## 2019-10-23 PROCEDURE — 96372 THER/PROPH/DIAG INJ SC/IM: CPT | Performed by: EMERGENCY MEDICINE

## 2019-10-23 PROCEDURE — 99285 EMERGENCY DEPT VISIT HI MDM: CPT | Mod: 25 | Performed by: EMERGENCY MEDICINE

## 2019-10-23 ASSESSMENT — MIFFLIN-ST. JEOR: SCORE: 1481.76

## 2019-10-23 NOTE — ED AVS SNAPSHOT
[facility], [place], Emergency Department  [address] [place] [id number]  Phone:  [phone]                                    Andrew Lockwood   MRN: [id number]    Department:  [facility], Emergency Department   Date of Visit:  10/23/2019           After Visit Summary Signature Page    I have received my discharge instructions, and my questions have been answered. I have discussed any challenges I see with this plan with the nurse or doctor.    ..........................................................................................................................................  Patient/Patient Representative Signature      ..........................................................................................................................................  Patient Representative Print Name and Relationship to Patient    ..................................................               ................................................  Date                                   Time    ..........................................................................................................................................  Reviewed by Signature/Title    ...................................................              ..............................................  Date                                               Time          22EPIC Rev 08/18

## 2019-10-24 ENCOUNTER — APPOINTMENT (OUTPATIENT)
Dept: GENERAL RADIOLOGY | Facility: CLINIC | Age: 54
End: 2019-10-24
Attending: EMERGENCY MEDICINE
Payer: COMMERCIAL

## 2019-10-24 VITALS
RESPIRATION RATE: 16 BRPM | SYSTOLIC BLOOD PRESSURE: 113 MMHG | BODY MASS INDEX: 27.31 KG/M2 | OXYGEN SATURATION: 99 % | TEMPERATURE: 97.4 F | HEART RATE: 67 BPM | DIASTOLIC BLOOD PRESSURE: 70 MMHG | WEIGHT: 160 LBS | HEIGHT: 64 IN

## 2019-10-24 LAB
GLUCOSE BLDC GLUCOMTR-MCNC: 482 MG/DL (ref 70–99)
GLUCOSE BLDC GLUCOMTR-MCNC: 502 MG/DL (ref 70–99)

## 2019-10-24 PROCEDURE — 25000131 ZZH RX MED GY IP 250 OP 636 PS 637: Performed by: EMERGENCY MEDICINE

## 2019-10-24 PROCEDURE — 25000132 ZZH RX MED GY IP 250 OP 250 PS 637: Performed by: EMERGENCY MEDICINE

## 2019-10-24 PROCEDURE — 73090 X-RAY EXAM OF FOREARM: CPT | Mod: RT

## 2019-10-24 PROCEDURE — 73080 X-RAY EXAM OF ELBOW: CPT | Mod: RT

## 2019-10-24 PROCEDURE — 96372 THER/PROPH/DIAG INJ SC/IM: CPT | Performed by: EMERGENCY MEDICINE

## 2019-10-24 PROCEDURE — 00000146 ZZHCL STATISTIC GLUCOSE BY METER IP

## 2019-10-24 PROCEDURE — 73030 X-RAY EXAM OF SHOULDER: CPT | Mod: RT

## 2019-10-24 PROCEDURE — 25000125 ZZHC RX 250: Performed by: EMERGENCY MEDICINE

## 2019-10-24 PROCEDURE — 73560 X-RAY EXAM OF KNEE 1 OR 2: CPT | Mod: RT

## 2019-10-24 RX ORDER — IBUPROFEN 600 MG/1
600 TABLET, FILM COATED ORAL ONCE
Status: COMPLETED | OUTPATIENT
Start: 2019-10-24 | End: 2019-10-24

## 2019-10-24 RX ADMIN — IBUPROFEN 600 MG: 600 TABLET, FILM COATED ORAL at 00:19

## 2019-10-24 RX ADMIN — LIDOCAINE HYDROCHLORIDE 30 ML: 20 SOLUTION ORAL; TOPICAL at 02:01

## 2019-10-24 RX ADMIN — INSULIN GLARGINE 40 UNITS: 100 INJECTION, SOLUTION SUBCUTANEOUS at 02:38

## 2019-10-24 RX ADMIN — INSULIN ASPART 10 UNITS: 100 INJECTION, SOLUTION INTRAVENOUS; SUBCUTANEOUS at 02:42

## 2019-10-24 ASSESSMENT — ENCOUNTER SYMPTOMS
NECK PAIN: 0
HEADACHES: 0
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
## 2019-10-24 NOTE — DISCHARGE INSTRUCTIONS
Please  your insulin as you have planned this morning.  Return to the emergency department if you develop fevers, chills, worsening pain or any other concerning symptoms.
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
## 2019-10-25 ENCOUNTER — HOSPITAL ENCOUNTER (EMERGENCY)
Facility: CLINIC | Age: 54
Discharge: HOME | End: 2019-10-26
Attending: EMERGENCY MEDICINE | Admitting: EMERGENCY MEDICINE
Payer: COMMERCIAL

## 2019-10-25 ENCOUNTER — APPOINTMENT (OUTPATIENT)
Dept: GENERAL RADIOLOGY | Facility: CLINIC | Age: 54
End: 2019-10-25
Attending: EMERGENCY MEDICINE
Payer: COMMERCIAL

## 2019-10-25 ENCOUNTER — APPOINTMENT (OUTPATIENT)
Dept: CT IMAGING | Facility: CLINIC | Age: 54
End: 2019-10-25
Attending: EMERGENCY MEDICINE
Payer: COMMERCIAL

## 2019-10-25 DIAGNOSIS — E11.65 TYPE 2 DIABETES MELLITUS WITH HYPERGLYCEMIA (H): ICD-10-CM

## 2019-10-25 DIAGNOSIS — R05.9 COUGH: ICD-10-CM

## 2019-10-25 DIAGNOSIS — R91.8 PULMONARY NODULES: ICD-10-CM

## 2019-10-25 DIAGNOSIS — F17.210 CIGARETTE SMOKER: ICD-10-CM

## 2019-10-25 DIAGNOSIS — B20 HUMAN IMMUNODEFICIENCY VIRUS (HIV) DISEASE (H): ICD-10-CM

## 2019-10-25 DIAGNOSIS — G43.809 OTHER MIGRAINE WITHOUT STATUS MIGRAINOSUS, NOT INTRACTABLE: ICD-10-CM

## 2019-10-25 DIAGNOSIS — Z79.4 ENCOUNTER FOR LONG-TERM (CURRENT) USE OF INSULIN (H): ICD-10-CM

## 2019-10-25 LAB
ALBUMIN SERPL-MCNC: 3.3 G/DL (ref 3.4–5)
ALP SERPL-CCNC: 172 U/L (ref 40–150)
ALT SERPL W P-5'-P-CCNC: 22 U/L (ref 0–70)
ANION GAP SERPL CALCULATED.3IONS-SCNC: 6 MMOL/L (ref 3–14)
AST SERPL W P-5'-P-CCNC: 25 U/L (ref 0–45)
BASOPHILS # BLD AUTO: 0 10E9/L (ref 0–0.2)
BASOPHILS NFR BLD AUTO: 0.4 %
BILIRUB SERPL-MCNC: 0.4 MG/DL (ref 0.2–1.3)
BUN SERPL-MCNC: 18 MG/DL (ref 7–30)
CALCIUM SERPL-MCNC: 8.6 MG/DL (ref 8.5–10.1)
CHLORIDE SERPL-SCNC: 99 MMOL/L (ref 94–109)
CO2 BLDCOV-SCNC: 27 MMOL/L (ref 21–28)
CO2 SERPL-SCNC: 26 MMOL/L (ref 20–32)
CREAT SERPL-MCNC: 0.87 MG/DL (ref 0.66–1.25)
DIFFERENTIAL METHOD BLD: ABNORMAL
EOSINOPHIL # BLD AUTO: 0.3 10E9/L (ref 0–0.7)
EOSINOPHIL NFR BLD AUTO: 3.5 %
ERYTHROCYTE [DISTWIDTH] IN BLOOD BY AUTOMATED COUNT: 12.3 % (ref 10–15)
GFR SERPL CREATININE-BSD FRML MDRD: >90 ML/MIN/{1.73_M2}
GLUCOSE SERPL-MCNC: 435 MG/DL (ref 70–99)
HCT VFR BLD AUTO: 39.6 % (ref 40–53)
HGB BLD-MCNC: 13.5 G/DL (ref 13.3–17.7)
IMM GRANULOCYTES # BLD: 0.1 10E9/L (ref 0–0.4)
IMM GRANULOCYTES NFR BLD: 0.9 %
LACTATE BLD-SCNC: 1.6 MMOL/L (ref 0.7–2.1)
LYMPHOCYTES # BLD AUTO: 2.3 10E9/L (ref 0.8–5.3)
LYMPHOCYTES NFR BLD AUTO: 25.7 %
MCH RBC QN AUTO: 30.5 PG (ref 26.5–33)
MCHC RBC AUTO-ENTMCNC: 34.1 G/DL (ref 31.5–36.5)
MCV RBC AUTO: 89 FL (ref 78–100)
MONOCYTES # BLD AUTO: 0.6 10E9/L (ref 0–1.3)
MONOCYTES NFR BLD AUTO: 6.3 %
NEUTROPHILS # BLD AUTO: 5.7 10E9/L (ref 1.6–8.3)
NEUTROPHILS NFR BLD AUTO: 63.2 %
NRBC # BLD AUTO: 0 10*3/UL
NRBC BLD AUTO-RTO: 0 /100
PCO2 BLDV: 37 MM HG (ref 40–50)
PH BLDV: 7.47 PH (ref 7.32–7.43)
PLATELET # BLD AUTO: 355 10E9/L (ref 150–450)
PO2 BLDV: 72 MM HG (ref 25–47)
POTASSIUM SERPL-SCNC: 3.8 MMOL/L (ref 3.4–5.3)
PROT SERPL-MCNC: 7.1 G/DL (ref 6.8–8.8)
RBC # BLD AUTO: 4.43 10E12/L (ref 4.4–5.9)
SAO2 % BLDV FROM PO2: 95 %
SODIUM SERPL-SCNC: 132 MMOL/L (ref 133–144)
WBC # BLD AUTO: 9.1 10E9/L (ref 4–11)

## 2019-10-25 PROCEDURE — 71046 X-RAY EXAM CHEST 2 VIEWS: CPT

## 2019-10-25 PROCEDURE — 93010 ELECTROCARDIOGRAM REPORT: CPT | Mod: Z6 | Performed by: EMERGENCY MEDICINE

## 2019-10-25 PROCEDURE — 25000128 H RX IP 250 OP 636: Performed by: EMERGENCY MEDICINE

## 2019-10-25 PROCEDURE — 96374 THER/PROPH/DIAG INJ IV PUSH: CPT | Mod: 59

## 2019-10-25 PROCEDURE — 82803 BLOOD GASES ANY COMBINATION: CPT

## 2019-10-25 PROCEDURE — 99285 EMERGENCY DEPT VISIT HI MDM: CPT | Mod: 25 | Performed by: EMERGENCY MEDICINE

## 2019-10-25 PROCEDURE — 93005 ELECTROCARDIOGRAM TRACING: CPT

## 2019-10-25 PROCEDURE — 71250 CT THORAX DX C-: CPT

## 2019-10-25 PROCEDURE — 96372 THER/PROPH/DIAG INJ SC/IM: CPT | Mod: 59

## 2019-10-25 PROCEDURE — 83605 ASSAY OF LACTIC ACID: CPT

## 2019-10-25 PROCEDURE — 96361 HYDRATE IV INFUSION ADD-ON: CPT

## 2019-10-25 PROCEDURE — 80053 COMPREHEN METABOLIC PANEL: CPT | Performed by: EMERGENCY MEDICINE

## 2019-10-25 PROCEDURE — 84145 PROCALCITONIN (PCT): CPT | Performed by: EMERGENCY MEDICINE

## 2019-10-25 PROCEDURE — 96375 TX/PRO/DX INJ NEW DRUG ADDON: CPT

## 2019-10-25 PROCEDURE — 87631 RESP VIRUS 3-5 TARGETS: CPT | Performed by: EMERGENCY MEDICINE

## 2019-10-25 PROCEDURE — 85025 COMPLETE CBC W/AUTO DIFF WBC: CPT | Performed by: EMERGENCY MEDICINE

## 2019-10-25 PROCEDURE — 99285 EMERGENCY DEPT VISIT HI MDM: CPT | Mod: 25

## 2019-10-25 PROCEDURE — 87633 RESP VIRUS 12-25 TARGETS: CPT | Performed by: EMERGENCY MEDICINE

## 2019-10-25 RX ORDER — DIPHENHYDRAMINE HYDROCHLORIDE 50 MG/ML
25 INJECTION INTRAMUSCULAR; INTRAVENOUS ONCE
Status: COMPLETED | OUTPATIENT
Start: 2019-10-25 | End: 2019-10-26

## 2019-10-25 RX ORDER — METOCLOPRAMIDE HYDROCHLORIDE 5 MG/ML
10 INJECTION INTRAMUSCULAR; INTRAVENOUS ONCE
Status: COMPLETED | OUTPATIENT
Start: 2019-10-25 | End: 2019-10-26

## 2019-10-25 RX ADMIN — SODIUM CHLORIDE 1000 ML: 9 INJECTION, SOLUTION INTRAVENOUS at 23:48

## 2019-10-25 ASSESSMENT — MIFFLIN-ST. JEOR: SCORE: 1519.46

## 2019-10-25 ASSESSMENT — ENCOUNTER SYMPTOMS
COUGH: 1
VOMITING: 0
SHORTNESS OF BREATH: 0
NAUSEA: 0
ABDOMINAL PAIN: 0

## 2019-10-25 NOTE — ED AVS SNAPSHOT
[facility], [place], Emergency Department  [address] [place] [address]  Phone:  [phone]                                    Andrew Lockwood   MRN: [id number]    Department:  [facility], Emergency Department   Date of Visit:  10/25/2019           After Visit Summary Signature Page    I have received my discharge instructions, and my questions have been answered. I have discussed any challenges I see with this plan with the nurse or doctor.    ..........................................................................................................................................  Patient/Patient Representative Signature      ..........................................................................................................................................  Patient Representative Print Name and Relationship to Patient    ..................................................               ................................................  Date                                   Time    ..........................................................................................................................................  Reviewed by Signature/Title    ...................................................              ..............................................  Date                                               Time          22EPIC Rev 08/18

## 2019-10-25 NOTE — ED TRIAGE NOTES
53 year old male HIV positive, presents with complaints of non-productive cough, fever, and headache for several days.  Patient states that he has a roommate with pneumonia.   Jannet Pereira has called requesting a refill of their controlled medication, OxyContin ER, for the management of chronic pain. Last office visit date: 5/24/19  Last opioid care agreement 11/19  Last UDS was done 5/24/19    Date last  was pulled and reviewed : 6/28/19  Last fill date for medication was 6/7/19    Was the patient compliant when the above report was pulled? yes    Analgesia: 70%    Aberrancies: oral swab + for Morphine    ADL's: none    Adverse Reaction: none    Provider's last note and plan of care reviewed? yes  Request forwarded to provider for review.

## 2019-10-25 NOTE — ED NOTES
"  ED Triage Provider Note  [facility]  Encounter Date: Oct 25, 2019  6:54 PM     History:  Chief Complaint   Patient presents with     Cough     Andrew Lockwood is a 51 year old male with a history of HIV and CNS toxoplasmosis as well as type 2 diabetes who presents with with a cough for the past 3 days.  Patient does have subjective fevers.  He is taking his HIV meds as directed.    Review of Systems:  Review of Systems   Respiratory: Positive for cough. Negative for shortness of breath.    Cardiovascular: Negative for chest pain.   Gastrointestinal: Negative for abdominal pain, nausea and vomiting.         Exam:  /80   Pulse 114   Temp 99.1  F (37.3  C) (Oral)   Resp 22   Ht 1.626 m (5' 4\")   Wt 76.3 kg (168 lb 5 oz)   SpO2 99%   BMI 28.89 kg/m    General: No acute distress. Appears stated age.  Eating lunch at a rapid rate  Cardio: tachycardic, extremities well perfused  Resp: Normal work of breathing, grossly normal respiratory rate, bibasilar crackles  Abdomen: soft, good BS, NT, ND  Neuro: Alert. rossly intact strength.       Medical Decision Making:  Patient arriving to the ED with problem as above. A medical screening exam was performed.  Laboratory studies, including a CBC, CMP and istat 4 orders initiated from Triage.  A chest x-ray is done.  The patient is appropriate to wait in triage.    JUANITA DE LEON MD    Note created by Juanita De Leon MD on 10/25/2019 at 6:54 PM      Juanita De Leon MD  10/25/19 1918    "

## 2019-10-26 ENCOUNTER — APPOINTMENT (OUTPATIENT)
Dept: CT IMAGING | Facility: CLINIC | Age: 54
End: 2019-10-26
Attending: EMERGENCY MEDICINE
Payer: COMMERCIAL

## 2019-10-26 VITALS
OXYGEN SATURATION: 100 % | WEIGHT: 168.31 LBS | TEMPERATURE: 99.1 F | HEART RATE: 95 BPM | HEIGHT: 64 IN | RESPIRATION RATE: 16 BRPM | SYSTOLIC BLOOD PRESSURE: 126 MMHG | DIASTOLIC BLOOD PRESSURE: 87 MMHG | BODY MASS INDEX: 28.73 KG/M2

## 2019-10-26 LAB
CO2 BLDCOV-SCNC: 25 MMOL/L (ref 21–28)
FLUAV H1 2009 PAND RNA SPEC QL NAA+PROBE: NEGATIVE
FLUAV H1 RNA SPEC QL NAA+PROBE: NEGATIVE
FLUAV H3 RNA SPEC QL NAA+PROBE: NEGATIVE
FLUAV RNA SPEC QL NAA+PROBE: NEGATIVE
FLUAV+FLUBV RNA SPEC QL NAA+PROBE: NEGATIVE
FLUAV+FLUBV RNA SPEC QL NAA+PROBE: NEGATIVE
FLUBV RNA SPEC QL NAA+PROBE: NEGATIVE
GLUCOSE BLDC GLUCOMTR-MCNC: 323 MG/DL (ref 70–99)
GLUCOSE BLDC GLUCOMTR-MCNC: 371 MG/DL (ref 70–99)
HADV DNA SPEC QL NAA+PROBE: NEGATIVE
HADV DNA SPEC QL NAA+PROBE: NEGATIVE
HMPV RNA SPEC QL NAA+PROBE: NEGATIVE
HPIV1 RNA SPEC QL NAA+PROBE: NEGATIVE
HPIV2 RNA SPEC QL NAA+PROBE: NEGATIVE
HPIV3 RNA SPEC QL NAA+PROBE: NEGATIVE
INTERPRETATION ECG - MUSE: NORMAL
LACTATE BLD-SCNC: 1.8 MMOL/L (ref 0.7–2.1)
MICROBIOLOGIST REVIEW: ABNORMAL
PCO2 BLDV: 38 MM HG (ref 40–50)
PH BLDV: 7.41 PH (ref 7.32–7.43)
PO2 BLDV: 61 MM HG (ref 25–47)
PROCALCITONIN SERPL-MCNC: <0.05 NG/ML
RADIOLOGIST FLAGS: NORMAL
RHINOVIRUS RNA SPEC QL NAA+PROBE: POSITIVE
RSV RNA SPEC NAA+PROBE: NEGATIVE
RSV RNA SPEC QL NAA+PROBE: NEGATIVE
RSV RNA SPEC QL NAA+PROBE: NEGATIVE
SAO2 % BLDV FROM PO2: 91 %
SPECIMEN SOURCE: ABNORMAL
SPECIMEN SOURCE: NORMAL
TROPONIN I BLD-MCNC: 0.02 UG/L (ref 0–0.08)

## 2019-10-26 PROCEDURE — 96372 THER/PROPH/DIAG INJ SC/IM: CPT

## 2019-10-26 PROCEDURE — 96375 TX/PRO/DX INJ NEW DRUG ADDON: CPT

## 2019-10-26 PROCEDURE — 82803 BLOOD GASES ANY COMBINATION: CPT

## 2019-10-26 PROCEDURE — 96374 THER/PROPH/DIAG INJ IV PUSH: CPT

## 2019-10-26 PROCEDURE — 87040 BLOOD CULTURE FOR BACTERIA: CPT | Performed by: EMERGENCY MEDICINE

## 2019-10-26 PROCEDURE — 84484 ASSAY OF TROPONIN QUANT: CPT

## 2019-10-26 PROCEDURE — 70450 CT HEAD/BRAIN W/O DYE: CPT

## 2019-10-26 PROCEDURE — 00000146 ZZHCL STATISTIC GLUCOSE BY METER IP

## 2019-10-26 PROCEDURE — 25000128 H RX IP 250 OP 636: Performed by: EMERGENCY MEDICINE

## 2019-10-26 PROCEDURE — 25000131 ZZH RX MED GY IP 250 OP 636 PS 637: Performed by: EMERGENCY MEDICINE

## 2019-10-26 PROCEDURE — 83605 ASSAY OF LACTIC ACID: CPT

## 2019-10-26 RX ADMIN — INSULIN ASPART 5 UNITS: 100 INJECTION, SOLUTION INTRAVENOUS; SUBCUTANEOUS at 01:29

## 2019-10-26 RX ADMIN — DIPHENHYDRAMINE HYDROCHLORIDE 25 MG: 50 INJECTION, SOLUTION INTRAMUSCULAR; INTRAVENOUS at 00:36

## 2019-10-26 RX ADMIN — METOCLOPRAMIDE 10 MG: 5 INJECTION, SOLUTION INTRAMUSCULAR; INTRAVENOUS at 00:36

## 2019-10-26 ASSESSMENT — ENCOUNTER SYMPTOMS
PHOTOPHOBIA: 1
COUGH: 1
HEADACHES: 1
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
## 2019-10-26 NOTE — DISCHARGE INSTRUCTIONS
Please make an appointment to follow up with Your Primary Care Provider and Infectious Disease Clinic (phone: [phone]) as soon as possible as needed.    You may walk in to the [facility] (ID clinic -6th Floor PWB) between the hours of 8 am and 4 pm Monday through Friday for NON EMERGENT concerns.      Return to the ED for worsening headache, chest pain, cough, fever, vision changes, recurrent vomiting, confusion, decreased responsiveness, fever, numbness or tingling, weakness, dizziness or problems with balance, or difficulty with speech.       You had an incidental finding of right upper lobe lung nodules on CT. Please follow up with your primary care doctor to discuss appropriate follow up imaging for these.

## 2019-10-26 NOTE — ED PROVIDER NOTES
History     Chief Complaint   Patient presents with     Chest Pain     HPI  Andrew Lockwood is a 51 year old male with history of HIV, CNS toxoplasmosis, type 2 diabetes, GERD and chronic abdominal pain, anal dysplasia who presents for evaluation of epigastric abdominal/chest pain that has been ongoing for the last week.  The patient states that he has noticed this discomfort in the epigastric region occurring only when he is lying flat.  If he is upright he has not noticed the pain.  This is been occurring at night.  He is denying any current pain and states that his last episode of pain was greater than 6 hours ago.  He describes it as a squeezing burning type pain.  He states that it does feel like his GERD.  He denies any nausea, vomiting, difficulty breathing, fevers, chills, or other abdominal pain.  He was concerned that this may be a recurrence of his toxoplasmosis.  From the record I see this was CNS toxoplasmosis 2015 however he is concerned about his lungs.  He denies any headache, numbness, tingling, weakness, or urinary symptoms.  He denies any hematemesis or melena.  He thinks he may have had a little bit of streaking of bright red when wiping with a bowel movement to last night.  He denies any rectal bleeding today.  No hematochezia.  He does have anal dysplasia and warts that he is scheduled to undergo a colorectal examination under anesthesia on October 16.  He denies noting any current blood in his stool.  He does follow with infectious disease here at the .  He has had issues with recurrent buttock abscesses but states that this is resolved currently.  He also reports he has had a slightly dry cough for 3 days.  He denies any illicit drug use or alcohol use.  He reports occasional tobacco use.  No history of cardiac disease.  No history of hypertension or hyperlipidemia.  No known family history of cardiac disease.  History of DVT or PE.  No recent travel or surgery.  Denies any recent  trauma.    I have reviewed the Medications, Allergies, Past Medical and Surgical History, and Social History in the [facility] system.    Past Medical History:   Diagnosis Date     AIDS (H)      Allergic rhinitis due to other allergen     DNS     Chronic abdominal pain      CNS toxoplasmosis (H)      Diabetes type 2, controlled (H)      GERD (gastroesophageal reflux disease)      HIV (human immunodeficiency virus infection) (H)      Periungual wart      Sleep apnea     doesn't use CPAP     Past Surgical History:   Procedure Laterality Date     C NONSPECIFIC PROCEDURE      right forearm fracture     COLONOSCOPY Left 1/22/2016    Procedure: COMBINED COLONOSCOPY, SINGLE OR MULTIPLE BIOPSY/POLYPECTOMY BY BIOPSY;  Surgeon: Clark Saini MD;  Location: [facility]     HC EXPLORE UNDESC TESTIS,INGUIN/SCROTAL       LAPAROSCOPIC APPENDECTOMY N/A 1/31/2018    Procedure: LAPAROSCOPIC APPENDECTOMY;  LAPAROSCOPIC APPENDECTOMY;  Surgeon: Dawn Holt MD;  Location: [facility] OR     LAPAROSCOPY DIAGNOSTIC (GENERAL) N/A 7/26/2016    Procedure: LAPAROSCOPY DIAGNOSTIC (GENERAL);  Surgeon: Susannah Arriaga MD;  [facility]     LAPAROSCOPY DIAGNOSTIC (GENERAL) N/A 4/16/2018    Procedure: LAPAROSCOPY DIAGNOSTIC (GENERAL);  Diagnostic laparoscopy and lysis of adhesions;  Surgeon: Prince Dowling MD;  Location: [facility] OR     OPTICAL TRACKING SYSTEM CRANIOTOMY, EXCISE TUMOR, COMBINED Left 4/10/2015    Procedure: COMBINED OPTICAL TRACKING SYSTEM CRANIOTOMY, EXCISE TUMOR;  Surgeon: Mirlande Colmenares MD;  [facility]     REPAIR GAMEKEEPER'S THUMB Right 12/2/2016    Procedure: REPAIR LIGAMENT ULNAR COLLATERAL THUMB (GAMEKEEPER'S);  Surgeon: Evin Zamorano MD;  Location:  OR     Social History     Socioeconomic History     Marital status: Single     Spouse name: Not on file     Number of children: Not on file     Years of education: Not on file     Highest education level: Not on file   Occupational History      Occupation:      Comment: 5 years; temp agency   Social Needs     Financial resource strain: Not on file     Food insecurity:     Worry: Not on file     Inability: Not on file     Transportation needs:     Medical: Not on file     Non-medical: Not on file   Tobacco Use     Smoking status: Current Some Day Smoker     Packs/day: 0.25     Types: Cigarettes     Last attempt to quit: 10/28/2016     Years since quitting: 3.0     Smokeless tobacco: Former User   Substance and Sexual Activity     Alcohol use: No     Alcohol/week: 0.0 standard drinks     Comment: Last etoh in 2007     Drug use: Not Currently     Types: Marijuana     Sexual activity: Not Currently     Partners: Female, Male     Comment: Last sexual activity 2008   Lifestyle     Physical activity:     Days per week: Not on file     Minutes per session: Not on file     Stress: Not on file   Relationships     Social connections:     Talks on phone: Not on file     Gets together: Not on file     Attends Jewish service: Not on file     Active member of club or organization: Not on file     Attends meetings of clubs or organizations: Not on file     Relationship status: Not on file     Intimate partner violence:     Fear of current or ex partner: Not on file     Emotionally abused: Not on file     Physically abused: Not on file     Forced sexual activity: Not on file   Other Topics Concern     Parent/sibling w/ CABG, MI or angioplasty before 65F 55M? Not Asked   Social History Narrative    Born in [facility].  [place]ame to the [place] in [place].  Last traveled to visit family in 2008.        1/7/2019 - Homeless. Working with a  at [employer] trying to get housing. Sexually active with two partners recently using condoms 100% of the time. Smokes occasionally, not for the last 4 weeks.        Allergies   Allergen Reactions     Metformin      Abdominal pain     Milk [Lac Bovis] Hives     Tylenol [Acetaminophen] Itching     Dulaglutide Rash  "          Review of Systems  Pertinent positives and negatives are documented in the HPI. All other systems reviewed and are negative.    Physical Exam   BP: 122/84  Pulse: 116  Heart Rate: 84  Temp: 98.3  F (36.8  C)  Resp: 18  Height: 162.6 cm (5' 4\")  Weight: 77.1 kg (170 lb)  SpO2: 98 %      Physical Exam  Vitals signs reviewed.   Constitutional:       General: He is not in acute distress.     Appearance: He is well-developed.   HENT:      Head: Normocephalic and atraumatic.      Nose: Nose normal.      Mouth/Throat:      Mouth: Mucous membranes are moist.      Pharynx: No oropharyngeal exudate or posterior oropharyngeal erythema.   Eyes:      Extraocular Movements: Extraocular movements intact.      Conjunctiva/sclera: Conjunctivae normal.      Pupils: Pupils are equal, round, and reactive to light.   Neck:      Musculoskeletal: Normal range of motion and neck supple.   Cardiovascular:      Rate and Rhythm: Normal rate and regular rhythm.      Pulses: Normal pulses.      Heart sounds: Normal heart sounds. No murmur. No friction rub. No gallop.    Pulmonary:      Effort: Pulmonary effort is normal. No respiratory distress.      Breath sounds: Normal breath sounds. No stridor. No wheezing or rales.   Chest:      Chest wall: No tenderness.   Abdominal:      General: Bowel sounds are normal. There is no distension.      Palpations: Abdomen is soft. There is no mass.      Tenderness: There is no tenderness. There is no right CVA tenderness, left CVA tenderness, guarding or rebound.   Genitourinary:     Comments: External exam only was performed by requested the patient as he is having an upcoming exam under anesthesia by his colorectal team.  I do see evidence of what appeared to be warts near the anal opening.  There is no gross blood or melena.  No thrombosed hemorrhoids.  No gluteal abscess or erythema noted on my evaluation.  Musculoskeletal: Normal range of motion.         General: No swelling or tenderness. "   Skin:     General: Skin is warm and dry.      Capillary Refill: Capillary refill takes less than 2 seconds.      Findings: No rash.   Neurological:      General: No focal deficit present.      Mental Status: He is alert and oriented to person, place, and time.      Cranial Nerves: Cranial nerves are intact. No cranial nerve deficit.      Sensory: Sensation is intact. No sensory deficit.      Motor: Motor function is intact. No weakness, abnormal muscle tone or pronator drift.      Coordination: Finger-Nose-Finger Test normal.      Gait: Gait is intact.      Comments: 5 out of 5 strength in all 4 extremities bilaterally.  Sensation intact light touch in all 4 extremities bilaterally.  Speech is normal.  Vision grossly intact.  Ambulating without difficulty.     Psychiatric:         Mood and Affect: Mood normal.         ED Course        Procedures              EKG Interpretation:      Interpreted by Mirian Perez MD  Time reviewed: 12:15 am  Symptoms at time of EKG: chest/epigastric pain   Rhythm: sinus tachycardia  Rate: 109 bpm  Axis: Normal  Ectopy: none  Conduction: normal  ST Segments/ T Waves: No ST-T wave changes  Q Waves: none  Comparison to prior: Unchanged from 8/21/19    Clinical Impression: No acute ischemia.                 Critical Care time:  none             Labs Ordered and Resulted from Time of ED Arrival Up to the Time of Departure from the ED   COMPREHENSIVE METABOLIC PANEL - Abnormal; Notable for the following components:       Result Value    Glucose 274 (*)     Albumin 3.3 (*)     All other components within normal limits   LIPASE   LACTIC ACID WHOLE BLOOD   CBC WITH PLATELETS DIFFERENTIAL   INR   PARTIAL THROMBOPLASTIN TIME   TROPONIN I     XR Chest 2 Views   Final Result   IMPRESSION:   Clear lungs.      I have personally reviewed the examination and initial interpretation   and I agree with the findings.      MYAH WHITE MD               Assessments & Plan (with Medical Decision  Making)   On exam, patient is overall well-appearing and in no acute distress.  He is presenting for epigastric/lower chest discomfort that he is describing as a burning and squeezing that has been occurring only when he is lying flat at night and trying to sleep.  He is reporting it feels like his GERD.  His main concern was that he did not have toxoplasmosis in his lungs.  He is denying any current discomfort and his abdominal exam is benign without any tenderness or signs of peritonitis.  EKG was obtained which revealed mild sinus tachycardia without evidence of acute ischemia.  This tachycardia resolved with IV fluid administration.  He was denying any complaints at this time.  We did consider cardiac etiology however how he was describing this pain really sounded more like his GERD.  He had not had pain for over 6 hours so we did obtain a single troponin which was less than 0.015 and with his EKG without any signs concerning for ischemia we felt that cardiac etiology would be less likely.  We also obtained further laboratory studies as above.  We did not feel he warranted any imaging of his abdomen as he does not have any tenderness or discomfort at this time.  His lipase was within normal limits.  His CMP was largely at its baseline.  His glucose was slightly elevated at 274 with his history of diabetes but he does not have any signs of DKA and has a normal bicarb level of 26.  His lactic acid was normal at 1.2.  His CBC was also largely unremarkable.  Hemoglobin was 14.  White blood cell count was 7.7.  We did obtain a chest x-ray which was without any signs of acute pathology.  It does appear that he had previously had CNS toxoplasmosis and not pulmonary however at this time he does not have any signs of concern of pulmonary infection.  He also does not have a headache and does not have any neurologic deficits to suggest any concern for CNS toxoplasmosis at this time.  He was given 40 mg of IV Protonix as  well as a GI cocktail.  He continued to feel at his baseline without any complaints of discomfort.  He had not had any vomiting or hematemesis or coffee-ground emesis.  He had reported yesterday having small streaks of blood on the toilet paper during a bowel movement.  I did perform an external exam however the patient declined digital rectal exam as he is having an exam under anesthesia by his colorectal team on the 16th.  On external exam he does have evidence of what appeared to be warts near the anal opening without any gross blood or melena and no signs of thrombosed hemorrhoids.  I do feel this is likely the cause of his slight amount of bleeding on the toilet paper however I am not concerned about a significant GI bleed at this time as this only occurred one time yesterday and he has not had any issues today.  He was resting comfortably throughout his stay here.  His heart rate was 84 on reevaluation and the remainder of his vital signs were within normal limits and remained stable.  He was afebrile.  With the spontaneous resolution of his mild tachycardia and his heart rate remaining within normal limits for multiple hours here and his lack of shortness of breath or pleuritic chest pain and him describing truly more of an epigastric discomfort we felt that PE would be unlikely at this time.  He also does not have any risk factors for PE.  With this negative work-up and his lack of symptoms throughout his stay here we felt that he was stable for discharge.  With his presentation I do have low concern for cardiac etiology and his heart score is low at 2 and thus I did feel again that he could follow-up appropriately with his primary care provider.  He was advised to see his primary care provider in the next 3 days and his colorectal surgeon as scheduled.  He was given a prescription for omeprazole to be taken daily.  He was advised to return to the emergency department if he develops worsening pain, shortness  of breath, fevers, blood in the vomit or stool, chest pain, or any new or worsening concerns.  He voiced understanding and was comfortable with this plan.  He was discharged home in stable condition.       I have reviewed the nursing notes.    I have reviewed the findings, diagnosis, plan and need for follow up with the patient.    Discharge Medication List as of 10/11/2019  7:58 AM          Final diagnoses:   Chronic gastritis without bleeding, unspecified gastritis type       10/11/2019   [facility], [place], EMERGENCY DEPARTMENT     Mirian Perez MD  11/05/19 [id number]

## 2019-10-28 ENCOUNTER — TELEPHONE (OUTPATIENT)
Dept: EMERGENCY MEDICINE | Facility: CLINIC | Age: 54
End: 2019-10-28

## 2019-10-28 NOTE — LETTER
October 28, 2019        Andrew Lockwood  [address] [place] [id number]          Dear Andrew Lockwood:    You were seen in the [facility] at [facility], [place], EMERGENCY DEPARTMENT on 10/25/2019.  We are unable to reach you by phone, so we are sending you this letter.     It is important that you call [facility] lab result nurse at [phone], as we have new information to relay to you regarding your Emergency Dept visit.     Best time to call back is between 10 a.m. and 6 p.m, 7 days a week.      Sincerely,     [facility] Lab Result RN  [phone]

## 2019-10-28 NOTE — TELEPHONE ENCOUNTER
[facility] Emergency Department/Urgent Care Lab result notification:    Reason for Letter being mailed out:      Patient treated in the Emergency Dept appropriate but they have NOT be notified about the Positive lab results.  Lab information to be reviewed with Patient or Parent    Unable to reach via telephone so letter sent with a message requesting a call back to [phone] between 10 a.m. and 6:30 p.m., 7 days a week for patient's ED/UC lab results.   Lab result:  Final Respiratory Virus Panel by PCR is POSITIVE for Human Rhinovirus   Patient to be notified of Positive result and advised per [place] Respiratory Virus Panel protocol..    Miscellaneous information: THEO Resendiz RN  [employer] [place] [facility] [place]  Emergency Dept Lab Result RN  Ph# [phone]

## 2019-10-29 ENCOUNTER — HOSPITAL ENCOUNTER (EMERGENCY)
Facility: CLINIC | Age: 54
Discharge: HOME | End: 2019-10-29
Attending: EMERGENCY MEDICINE | Admitting: EMERGENCY MEDICINE
Payer: COMMERCIAL

## 2019-10-29 ENCOUNTER — TELEPHONE (OUTPATIENT)
Dept: INTERNAL MEDICINE | Facility: CLINIC | Age: 54
End: 2019-10-29

## 2019-10-29 VITALS
BODY MASS INDEX: 29.31 KG/M2 | DIASTOLIC BLOOD PRESSURE: 83 MMHG | SYSTOLIC BLOOD PRESSURE: 130 MMHG | WEIGHT: 171.7 LBS | HEART RATE: 127 BPM | OXYGEN SATURATION: 98 % | HEIGHT: 64 IN | RESPIRATION RATE: 16 BRPM | TEMPERATURE: 99.5 F

## 2019-10-29 DIAGNOSIS — R91.8 PULMONARY NODULES: Primary | ICD-10-CM

## 2019-10-29 DIAGNOSIS — J02.0 STREP THROAT: ICD-10-CM

## 2019-10-29 LAB
DEPRECATED S PYO AG THROAT QL EIA: ABNORMAL
GLUCOSE BLDC GLUCOMTR-MCNC: 224 MG/DL (ref 70–99)
SPECIMEN SOURCE: ABNORMAL

## 2019-10-29 PROCEDURE — 96374 THER/PROPH/DIAG INJ IV PUSH: CPT | Performed by: EMERGENCY MEDICINE

## 2019-10-29 PROCEDURE — 96372 THER/PROPH/DIAG INJ SC/IM: CPT | Performed by: EMERGENCY MEDICINE

## 2019-10-29 PROCEDURE — 25000128 H RX IP 250 OP 636: Performed by: EMERGENCY MEDICINE

## 2019-10-29 PROCEDURE — 87880 STREP A ASSAY W/OPTIC: CPT | Performed by: EMERGENCY MEDICINE

## 2019-10-29 PROCEDURE — 99284 EMERGENCY DEPT VISIT MOD MDM: CPT | Mod: Z6 | Performed by: EMERGENCY MEDICINE

## 2019-10-29 PROCEDURE — 00000146 ZZHCL STATISTIC GLUCOSE BY METER IP

## 2019-10-29 PROCEDURE — 99284 EMERGENCY DEPT VISIT MOD MDM: CPT | Mod: 25 | Performed by: EMERGENCY MEDICINE

## 2019-10-29 RX ORDER — KETOROLAC TROMETHAMINE 30 MG/ML
30 INJECTION, SOLUTION INTRAMUSCULAR; INTRAVENOUS ONCE
Status: COMPLETED | OUTPATIENT
Start: 2019-10-29 | End: 2019-10-29

## 2019-10-29 RX ADMIN — KETOROLAC TROMETHAMINE 30 MG: 30 INJECTION, SOLUTION INTRAMUSCULAR at 23:30

## 2019-10-29 RX ADMIN — PENICILLIN G BENZATHINE 1.2 MILLION UNITS: 1200000 INJECTION, SUSPENSION INTRAMUSCULAR at 23:30

## 2019-10-29 ASSESSMENT — MIFFLIN-ST. JEOR: SCORE: 1534.83

## 2019-10-29 ASSESSMENT — ENCOUNTER SYMPTOMS
COUGH: 0
SORE THROAT: 1
FEVER: 1

## 2019-10-29 NOTE — TELEPHONE ENCOUNTER
Notified by radiology of pulmonary nodule seen on CT 10/26.  I'm listed as PCP; however, patient will generally see which ever provider is available in clinic and does not regularly follow with me.  To complicate things more, to my knowledge he is homeless and generally very difficult to reach.    He should get a repeat CT at 6 months to ensure stability of the nodule.  Any thoughts on the best way to get in contact with him?  He does not currently have a f/u appt scheduled.

## 2019-10-29 NOTE — TELEPHONE ENCOUNTER
Letter was sent to patient's e-mail address via Fax, letting him know to have repeat CT scan in 6 months.    Deb Quiroz RN on 10/29/2019 at 3:11 PM

## 2019-10-30 NOTE — ED TRIAGE NOTES
Patient presents to triage with c/o pharyngitis. Patient reports worsening sore throat since this morning. Also reports fever, chills, headache, and swallowing difficulty. Temp 99.5 during triage. Denies cough, SOB, abdominal pain, and N/V.

## 2019-10-30 NOTE — DISCHARGE INSTRUCTIONS
You have strep throat. We gave you a one time shot of antibiotics, it should cure your strep throat but please come back if you start feeling worse.

## 2019-10-30 NOTE — ED PROVIDER NOTES
[place] EMERGENCY DEPARTMENT ([facility])  October 29, 2019    History     Chief Complaint   Patient presents with     Pharyngitis     HPI  Andrew Lockwood is a 51 year old homeless male with history notable for HIV, CNS toxoplasmosis, DM2, and GERD who presents to the ED for sore throat and fever. Patient states he does have pain with swallowing. He denies cough.     Per chart review patient's last HIV RNA quant was 147 on 6/7/19 and last CD4 count 417 on 6/7/19.     PAST MEDICAL HISTORY  Past Medical History:   Diagnosis Date     AIDS (H)      Allergic rhinitis due to other allergen     DNS     Chronic abdominal pain      CNS toxoplasmosis (H)      Diabetes type 2, controlled (H)      GERD (gastroesophageal reflux disease)      HIV (human immunodeficiency virus infection) (H)      Periungual wart      Sleep apnea     doesn't use CPAP     PAST SURGICAL HISTORY  Past Surgical History:   Procedure Laterality Date     C NONSPECIFIC PROCEDURE      right forearm fracture     COLONOSCOPY Left 1/22/2016    Procedure: COMBINED COLONOSCOPY, SINGLE OR MULTIPLE BIOPSY/POLYPECTOMY BY BIOPSY;  Surgeon: Clark Saini MD;  Location[facility]     HC EXPLORE UNDESC TESTIS,INGUIN/SCROTAL       LAPAROSCOPIC APPENDECTOMY N/A 1/31/2018    Procedure: LAPAROSCOPIC APPENDECTOMY;  LAPAROSCOPIC APPENDECTOMY;  Surgeon: Dawn Holt MD;  [facility]     LAPAROSCOPY DIAGNOSTIC (GENERAL) N/A 7/26/2016    Procedure: LAPAROSCOPY DIAGNOSTIC (GENERAL);  Surgeon: Susannah Arriaga MD;  [facility]     LAPAROSCOPY DIAGNOSTIC (GENERAL) N/A 4/16/2018    Procedure: LAPAROSCOPY DIAGNOSTIC (GENERAL);  Diagnostic laparoscopy and lysis of adhesions;  Surgeon: Prince Dowling MD;  [facility]     OPTICAL TRACKING SYSTEM CRANIOTOMY, EXCISE TUMOR, COMBINED Left 4/10/2015    Procedure: COMBINED OPTICAL TRACKING SYSTEM CRANIOTOMY, EXCISE TUMOR;  Surgeon: Mirlande Colmenares MD;  [facility]     REPAIR  GAMEKEEPER'S THUMB Right 12/2/2016    Procedure: REPAIR LIGAMENT ULNAR COLLATERAL THUMB (GAMEKEEPER'S);  Surgeon: Evin Zamorano MD;  [facility]     FAMILY HISTORY  Family History   Problem Relation Age of Onset     Diabetes Brother      Diabetes Father      Alzheimer Disease Father      Unknown/Adopted Mother      Diabetes Paternal Grandfather      Cancer No family hx of         no skin cancer     Skin Cancer No family hx of         no famiy hx of skin cancer     Glaucoma No family hx of      Macular Degeneration No family hx of      SOCIAL HISTORY  Social History     Tobacco Use     Smoking status: Current Some Day Smoker     Packs/day: 0.25     Types: Cigarettes     Last attempt to quit: 10/28/2016     Years since quitting: 3.0     Smokeless tobacco: Former User   Substance Use Topics     Alcohol use: No     Alcohol/week: 0.0 standard drinks     Comment: Last etoh in 2007     MEDICATIONS  No current facility-administered medications for this encounter.      Current Outpatient Medications   Medication     bictegravir-emtricitabine-tenofovir (BIKTARVY) -25 MG per tablet     blood glucose (NO BRAND SPECIFIED) lancets standard     blood glucose (NO BRAND SPECIFIED) test strip     blood glucose monitoring (NO BRAND SPECIFIED) meter device kit     chlorhexidine (HIBICLENS) 4 % liquid     insulin glargine (LANTUS PEN) 100 UNIT/ML pen     insulin pen needle (B-D U/F) 31G X 8 MM miscellaneous     insulin pen needle (BD PEN NEEDLE SCOTT 2ND GEN) 32G X 4 MM miscellaneous     naproxen (NAPROSYN) 500 MG tablet     NOVOLOG FLEXPEN 100 UNIT/ML soln     omeprazole (PRILOSEC) 20 MG CR capsule     ranitidine (ZANTAC) 150 MG tablet     ALLERGIES  Allergies   Allergen Reactions     Metformin      Abdominal pain     Milk [Lac Bovis] Hives     Tylenol [Acetaminophen] Itching     Dulaglutide Rash       I have reviewed the Medications, Allergies, Past Medical and Surgical History, and Social History in the Epic  "system.    Review of Systems   Constitutional: Positive for fever.   HENT: Positive for sore throat.    Respiratory: Negative for cough.    All other systems reviewed and are negative.      Physical Exam   BP: 130/83  Pulse: 127  Heart Rate: 127  Temp: 99.5  F (37.5  C)  Resp: 18  Height: 162.6 cm (5' 4\")  Weight: 77.9 kg (171 lb 11.2 oz)  SpO2: 98 %      Physical Exam  Constitutional:       General: He is not in acute distress.     Appearance: He is well-developed. He is not toxic-appearing or diaphoretic.   HENT:      Head: Atraumatic.      Mouth/Throat:      Pharynx: Uvula midline. Oropharyngeal exudate present. No uvula swelling.      Tonsils: Tonsillar exudate present. No tonsillar abscesses.   Eyes:      General: No scleral icterus.     Pupils: Pupils are equal, round, and reactive to light.   Neck:      Musculoskeletal: Normal range of motion and neck supple.   Cardiovascular:      Rate and Rhythm: Tachycardia present.      Heart sounds: Normal heart sounds.   Pulmonary:      Effort: No respiratory distress.      Breath sounds: Normal breath sounds.   Abdominal:      General: Bowel sounds are normal.      Palpations: Abdomen is soft.      Tenderness: There is no abdominal tenderness.   Musculoskeletal:         General: No tenderness.   Skin:     General: Skin is warm.      Findings: No rash.   Neurological:      Mental Status: He is alert.         ED Course        Procedures   10:40 PM  The patient was seen and examined by Dr. Ga in [facility]               Critical Care time:  none             Labs Ordered and Resulted from Time of ED Arrival Up to the Time of Departure from the ED   GLUCOSE BY METER - Abnormal; Notable for the following components:       Result Value    Glucose 224 (*)     All other components within normal limits   RAPID STREP SCREEN - Abnormal; Notable for the following components:    Rapid Strep A Screen   (*)     Value: POSITIVE: Group A Streptococcal antigen detected by immunoassay. "    All other components within normal limits            Assessments & Plan (with Medical Decision Making)   51-year-old male here with strep pharyngitis, will give Bicillin, discharge patient with primary care follow-up.    I have reviewed the nursing notes.    I have reviewed the findings, diagnosis, plan and need for follow up with the patient.    Discharge Medication List as of 10/29/2019 11:21 PM          Final diagnoses:   Strep throat       IMinh, am serving as a trained medical scribe to document services personally performed by  Darío Ga DO, based on the provider's statements to me.      I, Darío Ga DO, was physically present and have reviewed and verified the accuracy of this note documented by Minh Boss.     10/29/2019   [facility], [place], EMERGENCY DEPARTMENT     Darío Ga MD  11/22/19 1530       Darío Ga MD  12/03/19 1946

## 2019-10-31 DIAGNOSIS — Z72.51 HIGH RISK SEXUAL BEHAVIOR, UNSPECIFIED TYPE: ICD-10-CM

## 2019-10-31 DIAGNOSIS — B20 HUMAN IMMUNODEFICIENCY VIRUS (HIV) DISEASE (H): Primary | ICD-10-CM

## 2019-10-31 DIAGNOSIS — B20 HUMAN IMMUNODEFICIENCY VIRUS (HIV) DISEASE (H): ICD-10-CM

## 2019-10-31 LAB
ALBUMIN SERPL-MCNC: 3.6 G/DL (ref 3.4–5)
ALBUMIN UR-MCNC: NEGATIVE MG/DL
ALP SERPL-CCNC: 123 U/L (ref 40–150)
ALT SERPL W P-5'-P-CCNC: 24 U/L (ref 0–70)
ANION GAP SERPL CALCULATED.3IONS-SCNC: 7 MMOL/L (ref 3–14)
APPEARANCE UR: CLEAR
AST SERPL W P-5'-P-CCNC: 22 U/L (ref 0–45)
BASOPHILS # BLD AUTO: 0.1 10E9/L (ref 0–0.2)
BASOPHILS NFR BLD AUTO: 0.5 %
BILIRUB SERPL-MCNC: 0.7 MG/DL (ref 0.2–1.3)
BILIRUB UR QL STRIP: NEGATIVE
BUN SERPL-MCNC: 14 MG/DL (ref 7–30)
CALCIUM SERPL-MCNC: 9.4 MG/DL (ref 8.5–10.1)
CD3 CELLS # BLD: 1582 CELLS/UL (ref 603–2990)
CD3 CELLS NFR BLD: 59 % (ref 49–84)
CD3+CD4+ CELLS # BLD: 401 CELLS/UL (ref 441–2156)
CD3+CD4+ CELLS NFR BLD: 15 % (ref 28–63)
CD3+CD4+ CELLS/CD3+CD8+ CLL BLD: 0.37 % (ref 1.4–2.6)
CD3+CD8+ CELLS # BLD: 1110 CELLS/UL (ref 125–1312)
CD3+CD8+ CELLS NFR BLD: 41 % (ref 10–40)
CHLORIDE SERPL-SCNC: 104 MMOL/L (ref 94–109)
CO2 SERPL-SCNC: 26 MMOL/L (ref 20–32)
COLOR UR AUTO: YELLOW
CREAT SERPL-MCNC: 0.69 MG/DL (ref 0.66–1.25)
DIFFERENTIAL METHOD BLD: NORMAL
EOSINOPHIL # BLD AUTO: 0.3 10E9/L (ref 0–0.7)
EOSINOPHIL NFR BLD AUTO: 2.7 %
ERYTHROCYTE [DISTWIDTH] IN BLOOD BY AUTOMATED COUNT: 12.4 % (ref 10–15)
GFR SERPL CREATININE-BSD FRML MDRD: >90 ML/MIN/{1.73_M2}
GLUCOSE BLDC GLUCOMTR-MCNC: 69 MG/DL (ref 70–99)
GLUCOSE SERPL-MCNC: 150 MG/DL (ref 70–99)
GLUCOSE UR STRIP-MCNC: NEGATIVE MG/DL
HCT VFR BLD AUTO: 41 % (ref 40–53)
HGB BLD-MCNC: 14 G/DL (ref 13.3–17.7)
HGB UR QL STRIP: ABNORMAL
IFC SPECIMEN: ABNORMAL
IMM GRANULOCYTES # BLD: 0.1 10E9/L (ref 0–0.4)
IMM GRANULOCYTES NFR BLD: 0.8 %
KETONES UR STRIP-MCNC: NEGATIVE MG/DL
LEUKOCYTE ESTERASE UR QL STRIP: NEGATIVE
LYMPHOCYTES # BLD AUTO: 2.6 10E9/L (ref 0.8–5.3)
LYMPHOCYTES NFR BLD AUTO: 27.9 %
MCH RBC QN AUTO: 30.6 PG (ref 26.5–33)
MCHC RBC AUTO-ENTMCNC: 34.1 G/DL (ref 31.5–36.5)
MCV RBC AUTO: 90 FL (ref 78–100)
MONOCYTES # BLD AUTO: 0.6 10E9/L (ref 0–1.3)
MONOCYTES NFR BLD AUTO: 6.7 %
MUCOUS THREADS #/AREA URNS LPF: PRESENT /LPF
NEUTROPHILS # BLD AUTO: 5.6 10E9/L (ref 1.6–8.3)
NEUTROPHILS NFR BLD AUTO: 61.4 %
NITRATE UR QL: NEGATIVE
NRBC # BLD AUTO: 0 10*3/UL
NRBC BLD AUTO-RTO: 0 /100
PH UR STRIP: 6 PH (ref 5–7)
PLATELET # BLD AUTO: 340 10E9/L (ref 150–450)
POTASSIUM SERPL-SCNC: 3.2 MMOL/L (ref 3.4–5.3)
PROT SERPL-MCNC: 7.9 G/DL (ref 6.8–8.8)
RBC # BLD AUTO: 4.58 10E12/L (ref 4.4–5.9)
RBC #/AREA URNS AUTO: 4 /HPF (ref 0–2)
SODIUM SERPL-SCNC: 136 MMOL/L (ref 133–144)
SOURCE: ABNORMAL
SP GR UR STRIP: 1.02 (ref 1–1.03)
UROBILINOGEN UR STRIP-MCNC: 0 MG/DL (ref 0–2)
WBC # BLD AUTO: 9.1 10E9/L (ref 4–11)
WBC #/AREA URNS AUTO: 1 /HPF (ref 0–5)

## 2019-10-31 PROCEDURE — 99283 EMERGENCY DEPT VISIT LOW MDM: CPT | Mod: Z6 | Performed by: EMERGENCY MEDICINE

## 2019-10-31 PROCEDURE — 99283 EMERGENCY DEPT VISIT LOW MDM: CPT

## 2019-10-31 PROCEDURE — 00000146 ZZHCL STATISTIC GLUCOSE BY METER IP

## 2019-10-31 ASSESSMENT — ENCOUNTER SYMPTOMS
SHORTNESS OF BREATH: 1
SEIZURES: 0
FEVER: 1
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
## 2019-11-01 ENCOUNTER — HOSPITAL ENCOUNTER (EMERGENCY)
Facility: CLINIC | Age: 54
Discharge: HOME | End: 2019-11-01
Attending: EMERGENCY MEDICINE | Admitting: EMERGENCY MEDICINE
Payer: COMMERCIAL

## 2019-11-01 VITALS
HEART RATE: 89 BPM | TEMPERATURE: 98.5 F | OXYGEN SATURATION: 97 % | BODY MASS INDEX: 29.35 KG/M2 | WEIGHT: 171 LBS | DIASTOLIC BLOOD PRESSURE: 85 MMHG | RESPIRATION RATE: 18 BRPM | SYSTOLIC BLOOD PRESSURE: 139 MMHG

## 2019-11-01 DIAGNOSIS — Z79.4 ENCOUNTER FOR LONG-TERM (CURRENT) USE OF INSULIN (H): ICD-10-CM

## 2019-11-01 DIAGNOSIS — E11.649 DIABETIC HYPOGLYCEMIA (H): ICD-10-CM

## 2019-11-01 DIAGNOSIS — E16.2 HYPOGLYCEMIA: ICD-10-CM

## 2019-11-01 DIAGNOSIS — Z59.00 LACK OF HOUSING: ICD-10-CM

## 2019-11-01 LAB
BACTERIA SPEC CULT: NO GROWTH
BACTERIA SPEC CULT: NO GROWTH
C TRACH DNA SPEC QL NAA+PROBE: NEGATIVE
GLUCOSE BLDC GLUCOMTR-MCNC: 114 MG/DL (ref 70–99)
GLUCOSE BLDC GLUCOMTR-MCNC: 332 MG/DL (ref 70–99)
HCV AB SERPL QL IA: NONREACTIVE
Lab: NORMAL
Lab: NORMAL
N GONORRHOEA DNA SPEC QL NAA+PROBE: NEGATIVE
SPECIMEN SOURCE: NORMAL
T PALLIDUM AB SER QL: NONREACTIVE

## 2019-11-01 PROCEDURE — 00000146 ZZHCL STATISTIC GLUCOSE BY METER IP

## 2019-11-01 SDOH — ECONOMIC STABILITY - HOUSING INSECURITY: HOMELESSNESS UNSPECIFIED: Z59.00

## 2019-11-01 ASSESSMENT — ENCOUNTER SYMPTOMS
DYSURIA: 0
LIGHT-HEADEDNESS: 1
HEADACHES: 0
NECK STIFFNESS: 0
DIARRHEA: 0
NECK PAIN: 0
ABDOMINAL PAIN: 0
ACTIVITY CHANGE: 1
SORE THROAT: 0
SHORTNESS OF BREATH: 0
WOUND: 0
NAUSEA: 0
VOMITING: 0
APPETITE CHANGE: 1
FATIGUE: 1

## 2019-11-01 NOTE — ED AVS SNAPSHOT
[facility], [place], Emergency Department  [address] [place] [id number]  Phone:  [phone]                                    Andrew Lockwood   MRN: [id number]    Department:  [facility], Emergency Department   Date of Visit:  10/31/2019           After Visit Summary Signature Page    I have received my discharge instructions, and my questions have been answered. I have discussed any challenges I see with this plan with the nurse or doctor.    ..........................................................................................................................................  Patient/Patient Representative Signature      ..........................................................................................................................................  Patient Representative Print Name and Relationship to Patient    ..................................................               ................................................  Date                                   Time    ..........................................................................................................................................  Reviewed by Signature/Title    ...................................................              ..............................................  Date                                               Time          22EPIC Rev 08/18

## 2019-11-01 NOTE — DISCHARGE INSTRUCTIONS
Return to the emergency department if you develop further episodes of hypoglycemia or if you have further concerns.    Follow-up with your primary care provider in 2 to 3 days if you have any further concerns    Continue to take your insulin as prescribed.  Check your blood sugars if you are not eating prior to insulin administration

## 2019-11-01 NOTE — ED TRIAGE NOTES
Pt arrived via walking with c/o hypoglycemia. On arrival to the ED pt alert and oriented x4 but lethargic. Pt's BG was 69 on arrival in triage. Pt ate esau crackers and drank an apple juice to correct BG. Vitals stable.

## 2019-11-01 NOTE — ED PROVIDER NOTES
[place] EMERGENCY DEPARTMENT ([facility])  10/31/19    History     Chief Complaint   Patient presents with     Hypoglycemia     The history is provided by the patient and medical records.     Andrew Lockwood is a 51 year old male with a history of HIV, CNS toxoplasmosis, DM II, GERD and homelessness who presents for evaluation of hypoglycemia.  Patient states that his blood sugar was low due to the fact that he is only eating soup since this morning.  Patient stated that he was not hungry, but would eat the sandwich that the Emergency Department provided for him. Patient was dizzy, no dizziness at this point. Denies headaches, fevers, chills.    Past Medical History:   Diagnosis Date     AIDS (H)      Allergic rhinitis due to other allergen     DNS     Chronic abdominal pain      CNS toxoplasmosis (H)      Diabetes type 2, controlled (H)      GERD (gastroesophageal reflux disease)      HIV (human immunodeficiency virus infection) (H)      Periungual wart      Sleep apnea     doesn't use CPAP       Past Surgical History:   Procedure Laterality Date     C NONSPECIFIC PROCEDURE      right forearm fracture     COLONOSCOPY Left 1/22/2016    Procedure: COMBINED COLONOSCOPY, SINGLE OR MULTIPLE BIOPSY/POLYPECTOMY BY BIOPSY;  Surgeon: Clark Saini MD;  Location: [facility] GI     HC EXPLORE UNDESC TESTIS,INGUIN/SCROTAL       LAPAROSCOPIC APPENDECTOMY N/A 1/31/2018    Procedure: LAPAROSCOPIC APPENDECTOMY;  LAPAROSCOPIC APPENDECTOMY;  Surgeon: Dawn Holt MD;  Location: [facility] OR     LAPAROSCOPY DIAGNOSTIC (GENERAL) N/A 7/26/2016    Procedure: LAPAROSCOPY DIAGNOSTIC (GENERAL);  Surgeon: Susannah Arriaga MD;  Location: [facility] OR     LAPAROSCOPY DIAGNOSTIC (GENERAL) N/A 4/16/2018    Procedure: LAPAROSCOPY DIAGNOSTIC (GENERAL);  Diagnostic laparoscopy and lysis of adhesions;  Surgeon: Prince Dowling MD;  Location: [facility] OR     OPTICAL TRACKING SYSTEM CRANIOTOMY, EXCISE TUMOR, COMBINED Left 4/10/2015     Procedure: COMBINED OPTICAL TRACKING SYSTEM CRANIOTOMY, EXCISE TUMOR;  Surgeon: Mirlande Colmenares MD;  Location: [facility] OR     REPAIR GAMEKEEPER'S THUMB Right 12/2/2016    Procedure: REPAIR LIGAMENT ULNAR COLLATERAL THUMB (GAMEKEEPER'S);  Surgeon: Evin Zamorano MD;  Location: [facility] OR       Family History   Problem Relation Age of Onset     Diabetes Brother      Diabetes Father      Alzheimer Disease Father      Unknown/Adopted Mother      Diabetes Paternal Grandfather      Cancer No family hx of         no skin cancer     Skin Cancer No family hx of         no famiy hx of skin cancer     Glaucoma No family hx of      Macular Degeneration No family hx of        Social History     Tobacco Use     Smoking status: Current Some Day Smoker     Packs/day: 0.25     Types: Cigarettes     Last attempt to quit: 10/28/2016     Years since quitting: 3.0     Smokeless tobacco: Former User   Substance Use Topics     Alcohol use: No     Alcohol/week: 0.0 standard drinks     Comment: Last etoh in 2007       Current Facility-Administered Medications   Medication     lidocaine (PF) (XYLOCAINE) 1 % injection 4 mL     triamcinolone (KENALOG-40) injection 40 mg     Current Outpatient Medications   Medication     bictegravir-emtricitabine-tenofovir (BIKTARVY) -25 MG per tablet     blood glucose (NO BRAND SPECIFIED) lancets standard     blood glucose (NO BRAND SPECIFIED) test strip     blood glucose monitoring (NO BRAND SPECIFIED) meter device kit     chlorhexidine (HIBICLENS) 4 % liquid     HUMALOG KWIKPEN 100 UNIT/ML soln     insulin glargine (BASAGLAR KWIKPEN) 100 UNIT/ML pen     insulin glargine (BASAGLAR KWIKPEN) 100 UNIT/ML pen     insulin pen needle (B-D U/F) 31G X 8 MM miscellaneous     insulin pen needle (BD PEN NEEDLE SCOTT 2ND GEN) 32G X 4 MM miscellaneous     naproxen (NAPROSYN) 250 MG tablet     naproxen (NAPROSYN) 500 MG tablet     NOVOLOG FLEXPEN 100 UNIT/ML soln     omeprazole (PRILOSEC) 20 MG CR capsule      ranitidine (ZANTAC) 150 MG tablet        Allergies   Allergen Reactions     Metformin      Abdominal pain     Milk [Lac Bovis] Hives     Tylenol [Acetaminophen] Itching     Dulaglutide Rash     I have reviewed the Medications, Allergies, Past Medical and Surgical History, and Social History in the Epic system.    Review of Systems   Constitutional: Positive for activity change, appetite change (poor p.o.) and fatigue.   HENT: Negative for sore throat.    Eyes: Negative for visual disturbance.   Respiratory: Negative for shortness of breath.    Cardiovascular: Negative for chest pain.   Gastrointestinal: Negative for abdominal pain, diarrhea, nausea and vomiting.   Genitourinary: Negative for dysuria.   Musculoskeletal: Negative for neck pain and neck stiffness.   Skin: Negative for wound.   Neurological: Positive for light-headedness. Negative for syncope and headaches.   Psychiatric/Behavioral: Negative for suicidal ideas.   All other systems reviewed and are negative.      Physical Exam   BP: 139/85  Heart Rate: 110  Temp: 98.5  F (36.9  C)  Weight: 77.6 kg (171 lb)  SpO2: 98 %      Physical Exam  Physical Exam   Constitutional: oriented to person, place, and time. appears well-developed and well-nourished.   HENT:   Head: Normocephalic and atraumatic.   Neck: Normal range of motion.   Pulmonary/Chest: Effort normal. No respiratory distress.   Cardiac: No murmurs, rubs, gallops. RRR.  Abdominal: Abdomen soft, nontender, nondistended. No rebound tenderness.  MSK: Long bones without deformity or evidence of trauma  Neurological: alert and oriented to person, place, and time. Gait is intact.  GCS 15.  Pain is finger intact.  Cranial nerves II through XII intact.  Skin: Skin is warm and dry.   Psychiatric:  normal mood and affect.  behavior is normal. Thought content normal.     ED Course   12:36 AM  The patient was seen and examined by Jun Wolff MD in Room EDVTD.        Procedures        Results for orders placed  or performed during the hospital encounter of 11/01/19   Glucose by meter     Status: Abnormal   Result Value Ref Range    Glucose 69 (L) 70 - 99 mg/dL   Glucose by meter     Status: Abnormal   Result Value Ref Range    Glucose 114 (H) 70 - 99 mg/dL   Glucose by meter     Status: Abnormal   Result Value Ref Range    Glucose 332 (H) 70 - 99 mg/dL         Labs Ordered and Resulted from Time of ED Arrival Up to the Time of Departure from the ED   GLUCOSE BY METER - Abnormal; Notable for the following components:       Result Value    Glucose 69 (*)     All other components within normal limits            Assessments & Plan (with Medical Decision Making)   MDM  Patient presented with hypoglycemia.  This is likely due to insulin use without much p.o. intake, the patient is homeless and has little access to food.  He was found in the emergency department, repeat blood sugar improved.  Patient will be observed for further episodes of hypoglycemia.    Re eval: Patient is asymptomatic.  Sugar is now 300.  He does have insulin with him that he knows how to use and knows the correction dosing.  Recommend following up with his primary doctor and to encourage p.o. intake.  The patient like to be discharged to feel that this is reasonable.  Patient will return if any symptoms return or if he has further concerns    I have reviewed the nursing notes.    I have reviewed the findings, diagnosis, plan and need for follow up with the patient.    New Prescriptions    No medications on file       Final diagnoses:   Hypoglycemia   IRuel, am serving as a trained medical scribe to document services personally performed by Venu Wolff MD, based on the provider's statements to me.      Venu KELLY MD, was physically present and have reviewed and verified the accuracy of this note documented by Ruel Vasquez.       10/31/2019   [facility], [place], EMERGENCY DEPARTMENT     Venu Wolff MD  11/01/19 0223

## 2019-11-02 LAB
HIV1 RNA # PLAS NAA DL=20: 79 {COPIES}/ML
HIV1 RNA SERPL NAA+PROBE-LOG#: 1.9 {LOG_COPIES}/ML

## 2019-11-10 ENCOUNTER — HOSPITAL ENCOUNTER (EMERGENCY)
Facility: CLINIC | Age: 54
Discharge: HOME | End: 2019-11-11
Attending: EMERGENCY MEDICINE | Admitting: EMERGENCY MEDICINE
Payer: COMMERCIAL

## 2019-11-10 DIAGNOSIS — R73.9 HYPERGLYCEMIA: ICD-10-CM

## 2019-11-10 LAB
ALBUMIN SERPL-MCNC: 3.2 G/DL (ref 3.4–5)
ALBUMIN UR-MCNC: NEGATIVE MG/DL
ALP SERPL-CCNC: 254 U/L (ref 40–150)
ALT SERPL W P-5'-P-CCNC: 25 U/L (ref 0–70)
ANION GAP SERPL CALCULATED.3IONS-SCNC: 8 MMOL/L (ref 3–14)
APPEARANCE UR: CLEAR
AST SERPL W P-5'-P-CCNC: 16 U/L (ref 0–45)
BASOPHILS # BLD AUTO: 0 10E9/L (ref 0–0.2)
BASOPHILS NFR BLD AUTO: 0.5 %
BILIRUB SERPL-MCNC: 0.3 MG/DL (ref 0.2–1.3)
BILIRUB UR QL STRIP: NEGATIVE
BUN SERPL-MCNC: 14 MG/DL (ref 7–30)
CALCIUM SERPL-MCNC: 9.1 MG/DL (ref 8.5–10.1)
CHLORIDE SERPL-SCNC: 96 MMOL/L (ref 94–109)
CO2 SERPL-SCNC: 26 MMOL/L (ref 20–32)
COLOR UR AUTO: ABNORMAL
CREAT SERPL-MCNC: 0.77 MG/DL (ref 0.66–1.25)
DIFFERENTIAL METHOD BLD: ABNORMAL
EOSINOPHIL # BLD AUTO: 0.2 10E9/L (ref 0–0.7)
EOSINOPHIL NFR BLD AUTO: 2.5 %
ERYTHROCYTE [DISTWIDTH] IN BLOOD BY AUTOMATED COUNT: 12.3 % (ref 10–15)
GFR SERPL CREATININE-BSD FRML MDRD: >90 ML/MIN/{1.73_M2}
GLUCOSE SERPL-MCNC: 745 MG/DL (ref 70–99)
GLUCOSE UR STRIP-MCNC: >1000 MG/DL
HCT VFR BLD AUTO: 37.4 % (ref 40–53)
HGB BLD-MCNC: 12.8 G/DL (ref 13.3–17.7)
HGB UR QL STRIP: NEGATIVE
IMM GRANULOCYTES # BLD: 0.1 10E9/L (ref 0–0.4)
IMM GRANULOCYTES NFR BLD: 0.8 %
KETONES UR STRIP-MCNC: NEGATIVE MG/DL
LEUKOCYTE ESTERASE UR QL STRIP: NEGATIVE
LYMPHOCYTES # BLD AUTO: 2.5 10E9/L (ref 0.8–5.3)
LYMPHOCYTES NFR BLD AUTO: 32.5 %
MCH RBC QN AUTO: 30.6 PG (ref 26.5–33)
MCHC RBC AUTO-ENTMCNC: 34.2 G/DL (ref 31.5–36.5)
MCV RBC AUTO: 90 FL (ref 78–100)
MONOCYTES # BLD AUTO: 0.5 10E9/L (ref 0–1.3)
MONOCYTES NFR BLD AUTO: 6.9 %
NEUTROPHILS # BLD AUTO: 4.4 10E9/L (ref 1.6–8.3)
NEUTROPHILS NFR BLD AUTO: 56.8 %
NITRATE UR QL: NEGATIVE
NRBC # BLD AUTO: 0 10*3/UL
NRBC BLD AUTO-RTO: 0 /100
PH UR STRIP: 6.5 PH (ref 5–7)
PLATELET # BLD AUTO: 398 10E9/L (ref 150–450)
POTASSIUM SERPL-SCNC: 3.7 MMOL/L (ref 3.4–5.3)
PROT SERPL-MCNC: 7.2 G/DL (ref 6.8–8.8)
RBC # BLD AUTO: 4.18 10E12/L (ref 4.4–5.9)
SODIUM SERPL-SCNC: 129 MMOL/L (ref 133–144)
SOURCE: ABNORMAL
SP GR UR STRIP: 1.03 (ref 1–1.03)
UROBILINOGEN UR STRIP-MCNC: NORMAL MG/DL (ref 0–2)
WBC # BLD AUTO: 7.7 10E9/L (ref 4–11)

## 2019-11-10 PROCEDURE — 99283 EMERGENCY DEPT VISIT LOW MDM: CPT | Mod: Z6 | Performed by: EMERGENCY MEDICINE

## 2019-11-10 PROCEDURE — 80053 COMPREHEN METABOLIC PANEL: CPT | Performed by: EMERGENCY MEDICINE

## 2019-11-10 PROCEDURE — 82947 ASSAY GLUCOSE BLOOD QUANT: CPT | Performed by: FAMILY MEDICINE

## 2019-11-10 PROCEDURE — 84295 ASSAY OF SERUM SODIUM: CPT | Performed by: FAMILY MEDICINE

## 2019-11-10 PROCEDURE — 82803 BLOOD GASES ANY COMBINATION: CPT | Performed by: FAMILY MEDICINE

## 2019-11-10 PROCEDURE — 00000146 ZZHCL STATISTIC GLUCOSE BY METER IP

## 2019-11-10 PROCEDURE — 25800030 ZZH RX IP 258 OP 636: Performed by: EMERGENCY MEDICINE

## 2019-11-10 PROCEDURE — 82330 ASSAY OF CALCIUM: CPT | Performed by: FAMILY MEDICINE

## 2019-11-10 PROCEDURE — 81003 URINALYSIS AUTO W/O SCOPE: CPT | Performed by: EMERGENCY MEDICINE

## 2019-11-10 PROCEDURE — 96360 HYDRATION IV INFUSION INIT: CPT | Performed by: EMERGENCY MEDICINE

## 2019-11-10 PROCEDURE — 85025 COMPLETE CBC W/AUTO DIFF WBC: CPT | Performed by: EMERGENCY MEDICINE

## 2019-11-10 PROCEDURE — 85014 HEMATOCRIT: CPT | Performed by: FAMILY MEDICINE

## 2019-11-10 PROCEDURE — 99283 EMERGENCY DEPT VISIT LOW MDM: CPT | Mod: 25 | Performed by: EMERGENCY MEDICINE

## 2019-11-10 PROCEDURE — 84132 ASSAY OF SERUM POTASSIUM: CPT | Performed by: FAMILY MEDICINE

## 2019-11-10 RX ORDER — SODIUM CHLORIDE 9 MG/ML
1000 INJECTION, SOLUTION INTRAVENOUS CONTINUOUS
Status: DISCONTINUED | OUTPATIENT
Start: 2019-11-10 | End: 2019-11-11 | Stop reason: HOSPADM

## 2019-11-10 RX ADMIN — SODIUM CHLORIDE 1000 ML: 9 INJECTION, SOLUTION INTRAVENOUS at 21:39

## 2019-11-10 ASSESSMENT — MIFFLIN-ST. JEOR: SCORE: 1504.44

## 2019-11-10 ASSESSMENT — ENCOUNTER SYMPTOMS: SPEECH DIFFICULTY: 1

## 2019-11-10 NOTE — ED AVS SNAPSHOT
[facility], [place], Emergency Department  [address] [place] [id number]  Phone:  [phone]                                    Andrew Lockwood   MRN: [id number]    Department:  [facility], Emergency Department   Date of Visit:  11/10/2019           After Visit Summary Signature Page    I have received my discharge instructions, and my questions have been answered. I have discussed any challenges I see with this plan with the nurse or doctor.    ..........................................................................................................................................  Patient/Patient Representative Signature      ..........................................................................................................................................  Patient Representative Print Name and Relationship to Patient    ..................................................               ................................................  Date                                   Time    ..........................................................................................................................................  Reviewed by Signature/Title    ...................................................              ..............................................  Date                                               Time          22EPIC Rev 08/18

## 2019-11-11 ENCOUNTER — APPOINTMENT (OUTPATIENT)
Dept: CT IMAGING | Facility: CLINIC | Age: 54
End: 2019-11-11
Payer: COMMERCIAL

## 2019-11-11 ENCOUNTER — APPOINTMENT (OUTPATIENT)
Dept: GENERAL RADIOLOGY | Facility: CLINIC | Age: 54
End: 2019-11-11
Payer: COMMERCIAL

## 2019-11-11 ENCOUNTER — HOSPITAL ENCOUNTER (OUTPATIENT)
Facility: CLINIC | Age: 54
Setting detail: OBSERVATION
Discharge: HOME | End: 2019-11-12
Attending: EMERGENCY MEDICINE | Admitting: FAMILY MEDICINE
Payer: COMMERCIAL

## 2019-11-11 VITALS
OXYGEN SATURATION: 99 % | BODY MASS INDEX: 28.17 KG/M2 | HEIGHT: 64 IN | WEIGHT: 165 LBS | RESPIRATION RATE: 14 BRPM | HEART RATE: 97 BPM | TEMPERATURE: 98.3 F | DIASTOLIC BLOOD PRESSURE: 89 MMHG | SYSTOLIC BLOOD PRESSURE: 134 MMHG

## 2019-11-11 DIAGNOSIS — R51.9 NONINTRACTABLE HEADACHE, UNSPECIFIED CHRONICITY PATTERN, UNSPECIFIED HEADACHE TYPE: ICD-10-CM

## 2019-11-11 DIAGNOSIS — R73.9 HYPERGLYCEMIA: ICD-10-CM

## 2019-11-11 DIAGNOSIS — R05.9 COUGH: ICD-10-CM

## 2019-11-11 DIAGNOSIS — Z79.4 CURRENT USE OF INSULIN (H): ICD-10-CM

## 2019-11-11 DIAGNOSIS — E11.65 UNCONTROLLED TYPE 2 DIABETES MELLITUS WITH HYPERGLYCEMIA (H): ICD-10-CM

## 2019-11-11 DIAGNOSIS — B20 HUMAN IMMUNODEFICIENCY VIRUS (HIV) DISEASE (H): ICD-10-CM

## 2019-11-11 DIAGNOSIS — E11.65 TYPE 2 DIABETES MELLITUS WITH HYPERGLYCEMIA, WITHOUT LONG-TERM CURRENT USE OF INSULIN (H): ICD-10-CM

## 2019-11-11 DIAGNOSIS — Z91.148 NONCOMPLIANCE WITH MEDICATION REGIMEN: ICD-10-CM

## 2019-11-11 LAB
ANION GAP SERPL CALCULATED.3IONS-SCNC: 8 MMOL/L (ref 3–14)
BUN SERPL-MCNC: 11 MG/DL (ref 7–30)
CA-I BLD-SCNC: 5.2 MG/DL (ref 4.4–5.2)
CALCIUM SERPL-MCNC: 9.1 MG/DL (ref 8.5–10.1)
CHLORIDE SERPL-SCNC: 98 MMOL/L (ref 94–109)
CO2 BLDCOV-SCNC: 26 MMOL/L (ref 21–28)
CO2 SERPL-SCNC: 25 MMOL/L (ref 20–32)
CREAT SERPL-MCNC: 0.58 MG/DL (ref 0.66–1.25)
FLUAV+FLUBV RNA SPEC QL NAA+PROBE: NEGATIVE
FLUAV+FLUBV RNA SPEC QL NAA+PROBE: NEGATIVE
GFR SERPL CREATININE-BSD FRML MDRD: >90 ML/MIN/{1.73_M2}
GLUCOSE BLD-MCNC: >700 MG/DL (ref 70–99)
GLUCOSE BLDC GLUCOMTR-MCNC: 162 MG/DL (ref 70–99)
GLUCOSE BLDC GLUCOMTR-MCNC: 193 MG/DL (ref 70–99)
GLUCOSE BLDC GLUCOMTR-MCNC: 230 MG/DL (ref 70–99)
GLUCOSE BLDC GLUCOMTR-MCNC: 233 MG/DL (ref 70–99)
GLUCOSE BLDC GLUCOMTR-MCNC: 282 MG/DL (ref 70–99)
GLUCOSE BLDC GLUCOMTR-MCNC: 310 MG/DL (ref 70–99)
GLUCOSE BLDC GLUCOMTR-MCNC: 348 MG/DL (ref 70–99)
GLUCOSE BLDC GLUCOMTR-MCNC: 510 MG/DL (ref 70–99)
GLUCOSE BLDC GLUCOMTR-MCNC: 545 MG/DL (ref 70–99)
GLUCOSE BLDC GLUCOMTR-MCNC: 548 MG/DL (ref 70–99)
GLUCOSE BLDC GLUCOMTR-MCNC: >600 MG/DL (ref 70–99)
GLUCOSE SERPL-MCNC: 560 MG/DL (ref 70–99)
HCT VFR BLD CALC: 36 %PCV (ref 40–53)
HGB BLD CALC-MCNC: 12.2 G/DL (ref 13.3–17.7)
MAGNESIUM SERPL-MCNC: 1.9 MG/DL (ref 1.6–2.3)
NT-PROBNP SERPL-MCNC: 68 PG/ML (ref 0–900)
PCO2 BLDV: 45 MM HG (ref 40–50)
PH BLDV: 7.36 PH (ref 7.32–7.43)
PHOSPHATE SERPL-MCNC: 3.3 MG/DL (ref 2.5–4.5)
PO2 BLDV: 66 MM HG (ref 25–47)
POTASSIUM BLD-SCNC: 3.9 MMOL/L (ref 3.4–5.3)
POTASSIUM SERPL-SCNC: 4.1 MMOL/L (ref 3.4–5.3)
RSV RNA SPEC NAA+PROBE: NEGATIVE
SAO2 % BLDV FROM PO2: 92 %
SODIUM BLD-SCNC: 131 MMOL/L (ref 133–144)
SODIUM SERPL-SCNC: 130 MMOL/L (ref 133–144)
SPECIMEN SOURCE: NORMAL

## 2019-11-11 PROCEDURE — 87631 RESP VIRUS 3-5 TARGETS: CPT | Performed by: PHYSICIAN ASSISTANT

## 2019-11-11 PROCEDURE — G0378 HOSPITAL OBSERVATION PER HR: HCPCS

## 2019-11-11 PROCEDURE — 96372 THER/PROPH/DIAG INJ SC/IM: CPT | Mod: 59 | Performed by: EMERGENCY MEDICINE

## 2019-11-11 PROCEDURE — 70450 CT HEAD/BRAIN W/O DYE: CPT

## 2019-11-11 PROCEDURE — 80048 BASIC METABOLIC PNL TOTAL CA: CPT | Performed by: EMERGENCY MEDICINE

## 2019-11-11 PROCEDURE — 25000132 ZZH RX MED GY IP 250 OP 250 PS 637: Performed by: PHYSICIAN ASSISTANT

## 2019-11-11 PROCEDURE — 00000146 ZZHCL STATISTIC GLUCOSE BY METER IP

## 2019-11-11 PROCEDURE — 96361 HYDRATE IV INFUSION ADD-ON: CPT | Performed by: EMERGENCY MEDICINE

## 2019-11-11 PROCEDURE — 99285 EMERGENCY DEPT VISIT HI MDM: CPT | Mod: 25 | Performed by: EMERGENCY MEDICINE

## 2019-11-11 PROCEDURE — 84100 ASSAY OF PHOSPHORUS: CPT | Performed by: EMERGENCY MEDICINE

## 2019-11-11 PROCEDURE — 25800030 ZZH RX IP 258 OP 636: Performed by: EMERGENCY MEDICINE

## 2019-11-11 PROCEDURE — 25000131 ZZH RX MED GY IP 250 OP 636 PS 637: Performed by: EMERGENCY MEDICINE

## 2019-11-11 PROCEDURE — 83880 ASSAY OF NATRIURETIC PEPTIDE: CPT | Performed by: EMERGENCY MEDICINE

## 2019-11-11 PROCEDURE — 83735 ASSAY OF MAGNESIUM: CPT | Performed by: EMERGENCY MEDICINE

## 2019-11-11 PROCEDURE — 96372 THER/PROPH/DIAG INJ SC/IM: CPT

## 2019-11-11 PROCEDURE — 25000131 ZZH RX MED GY IP 250 OP 636 PS 637: Performed by: FAMILY MEDICINE

## 2019-11-11 PROCEDURE — 25000131 ZZH RX MED GY IP 250 OP 636 PS 637: Performed by: PHYSICIAN ASSISTANT

## 2019-11-11 PROCEDURE — 25000128 H RX IP 250 OP 636: Performed by: PHYSICIAN ASSISTANT

## 2019-11-11 PROCEDURE — 96375 TX/PRO/DX INJ NEW DRUG ADDON: CPT | Performed by: EMERGENCY MEDICINE

## 2019-11-11 PROCEDURE — 99220 ZZC INITIAL OBSERVATION CARE,LEVL III: CPT | Mod: Z6 | Performed by: PHYSICIAN ASSISTANT

## 2019-11-11 PROCEDURE — 71046 X-RAY EXAM CHEST 2 VIEWS: CPT

## 2019-11-11 PROCEDURE — 96374 THER/PROPH/DIAG INJ IV PUSH: CPT | Performed by: EMERGENCY MEDICINE

## 2019-11-11 RX ORDER — METOCLOPRAMIDE HYDROCHLORIDE 5 MG/ML
10 INJECTION INTRAMUSCULAR; INTRAVENOUS ONCE
Status: COMPLETED | OUTPATIENT
Start: 2019-11-11 | End: 2019-11-11

## 2019-11-11 RX ORDER — DIPHENHYDRAMINE HYDROCHLORIDE 50 MG/ML
25 INJECTION INTRAMUSCULAR; INTRAVENOUS ONCE
Status: COMPLETED | OUTPATIENT
Start: 2019-11-11 | End: 2019-11-11

## 2019-11-11 RX ORDER — DEXTROSE MONOHYDRATE 25 G/50ML
25-50 INJECTION, SOLUTION INTRAVENOUS
Status: DISCONTINUED | OUTPATIENT
Start: 2019-11-11 | End: 2019-11-12 | Stop reason: HOSPADM

## 2019-11-11 RX ORDER — KETOROLAC TROMETHAMINE 30 MG/ML
30 INJECTION, SOLUTION INTRAMUSCULAR; INTRAVENOUS ONCE
Status: COMPLETED | OUTPATIENT
Start: 2019-11-11 | End: 2019-11-11

## 2019-11-11 RX ORDER — NICOTINE POLACRILEX 4 MG
15-30 LOZENGE BUCCAL
Status: DISCONTINUED | OUTPATIENT
Start: 2019-11-11 | End: 2019-11-12 | Stop reason: HOSPADM

## 2019-11-11 RX ORDER — METOCLOPRAMIDE HYDROCHLORIDE 5 MG/ML
10 INJECTION INTRAMUSCULAR; INTRAVENOUS EVERY 6 HOURS PRN
Status: DISCONTINUED | OUTPATIENT
Start: 2019-11-11 | End: 2019-11-12 | Stop reason: HOSPADM

## 2019-11-11 RX ORDER — ACETAMINOPHEN 325 MG/1
650 TABLET ORAL EVERY 4 HOURS PRN
Status: DISCONTINUED | OUTPATIENT
Start: 2019-11-11 | End: 2019-11-12 | Stop reason: HOSPADM

## 2019-11-11 RX ORDER — SODIUM CHLORIDE 9 MG/ML
1000 INJECTION, SOLUTION INTRAVENOUS CONTINUOUS
Status: DISCONTINUED | OUTPATIENT
Start: 2019-11-11 | End: 2019-11-12 | Stop reason: HOSPADM

## 2019-11-11 RX ORDER — ONDANSETRON 2 MG/ML
4 INJECTION INTRAMUSCULAR; INTRAVENOUS EVERY 6 HOURS PRN
Status: DISCONTINUED | OUTPATIENT
Start: 2019-11-11 | End: 2019-11-12 | Stop reason: HOSPADM

## 2019-11-11 RX ORDER — ACETAMINOPHEN 650 MG/1
650 SUPPOSITORY RECTAL EVERY 4 HOURS PRN
Status: DISCONTINUED | OUTPATIENT
Start: 2019-11-11 | End: 2019-11-12 | Stop reason: HOSPADM

## 2019-11-11 RX ORDER — INSULIN GLARGINE 100 [IU]/ML
40 INJECTION, SOLUTION SUBCUTANEOUS 2 TIMES DAILY
Status: DISCONTINUED | OUTPATIENT
Start: 2019-11-11 | End: 2019-11-11

## 2019-11-11 RX ORDER — IPRATROPIUM BROMIDE 21 UG/1
2 SPRAY, METERED NASAL ONCE
Status: COMPLETED | OUTPATIENT
Start: 2019-11-11 | End: 2019-11-11

## 2019-11-11 RX ORDER — LIDOCAINE 40 MG/G
CREAM TOPICAL
Status: DISCONTINUED | OUTPATIENT
Start: 2019-11-11 | End: 2019-11-12 | Stop reason: HOSPADM

## 2019-11-11 RX ADMIN — BICTEGRAVIR SODIUM, EMTRICITABINE, AND TENOFOVIR ALAFENAMIDE FUMARATE 1 TABLET: 50; 200; 25 TABLET ORAL at 08:32

## 2019-11-11 RX ADMIN — IPRATROPIUM BROMIDE 2 SPRAY: 21 SPRAY NASAL at 08:32

## 2019-11-11 RX ADMIN — INSULIN ASPART 6 UNITS: 100 INJECTION, SOLUTION INTRAVENOUS; SUBCUTANEOUS at 00:04

## 2019-11-11 RX ADMIN — SODIUM CHLORIDE 1000 ML: 900 INJECTION, SOLUTION INTRAVENOUS at 05:00

## 2019-11-11 RX ADMIN — INSULIN ASPART 4 UNITS: 100 INJECTION, SOLUTION INTRAVENOUS; SUBCUTANEOUS at 08:37

## 2019-11-11 RX ADMIN — SODIUM CHLORIDE 1000 ML: 900 INJECTION, SOLUTION INTRAVENOUS at 04:26

## 2019-11-11 RX ADMIN — ACETAMINOPHEN 650 MG: 325 TABLET, FILM COATED ORAL at 17:03

## 2019-11-11 RX ADMIN — KETOROLAC TROMETHAMINE 30 MG: 30 INJECTION, SOLUTION INTRAMUSCULAR at 06:38

## 2019-11-11 RX ADMIN — METOCLOPRAMIDE HYDROCHLORIDE 10 MG: 5 INJECTION INTRAMUSCULAR; INTRAVENOUS at 06:38

## 2019-11-11 RX ADMIN — INSULIN GLARGINE 40 UNITS: 100 INJECTION, SOLUTION SUBCUTANEOUS at 08:42

## 2019-11-11 RX ADMIN — DIPHENHYDRAMINE HYDROCHLORIDE 25 MG: 50 INJECTION, SOLUTION INTRAMUSCULAR; INTRAVENOUS at 06:38

## 2019-11-11 RX ADMIN — INSULIN ASPART 1 UNITS: 100 INJECTION, SOLUTION INTRAVENOUS; SUBCUTANEOUS at 12:07

## 2019-11-11 RX ADMIN — INSULIN ASPART 8 UNITS: 100 INJECTION, SOLUTION INTRAVENOUS; SUBCUTANEOUS at 06:12

## 2019-11-11 ASSESSMENT — ENCOUNTER SYMPTOMS
MYALGIAS: 1
ACTIVITY IMPAIRMENT: IMPAIRED DUE TO WEAKNESS
RESPIRATORY NEGATIVE: 1
GASTROINTESTINAL NEGATIVE: 1
TREMORS: 1
NO PATIENT REPORTED PAIN: 1
CARDIOVASCULAR NEGATIVE: 1
WEAKNESS: 1
DIETARY ISSUES: ADEQUATE INTAKE
ACTIVITY IMPAIRMENT: NORMAL
FATIGUE: 1

## 2019-11-11 NOTE — PROGRESS NOTES
Andrew Lockwood is a 51 year old male patient.  1. Hyperglycemia      Past Medical History:   Diagnosis Date     AIDS (H)      Allergic rhinitis due to other allergen     DNS     Chronic abdominal pain      CNS toxoplasmosis (H)      Diabetes type 2, controlled (H)      GERD (gastroesophageal reflux disease)      HIV (human immunodeficiency virus infection) (H)      Periungual wart      Sleep apnea     doesn't use CPAP     No current outpatient medications on file.     Allergies   Allergen Reactions     Metformin      Abdominal pain     Milk [Lac Bovis] Hives     Tylenol [Acetaminophen] Itching     Dulaglutide Rash     Active Problems:    Hyperglycemia    Blood pressure 115/81, pulse 92, temperature 99.1  F (37.3  C), temperature source Oral, resp. rate 16, weight 72.2 kg (159 lb 3.2 oz), SpO2 99 %.    Subjective:  Symptoms:  Stable.  He reports malaise and weakness.    Diet:  Adequate intake.    Activity level: Impaired due to weakness.    Pain:  He reports no pain.      Objective:  General Appearance:  Comfortable.    Vital signs: (most recent): Blood pressure 115/81, pulse 92, temperature 99.1  F (37.3  C), temperature source Oral, resp. rate 16, weight 72.2 kg (159 lb 3.2 oz), SpO2 99 %.  Vital signs are normal.    Output: Producing urine.    HEENT: Normal HEENT exam.    Lungs:  Normal effort and normal respiratory rate.  Breath sounds clear to auscultation.    Heart: Normal rate.  Regular rhythm.  S1 normal and S2 normal.    Abdomen: Abdomen is soft.  Bowel sounds are normal.   There is no abdominal tenderness.     Extremities: Normal range of motion.    Pulses: Distal pulses are intact.    Neurological: Patient is alert.    Pupils:  Pupils are equal, round, and reactive to light.    Skin:  Warm.      Assessment:    Condition: In stable condition.  Improving.   (51 yom with HIV h/o Toxo CNS presents with hyperglycemia, slurred speech.    CT neg.     Glu improving, awaiting Encdocrine input.).       Zaire Oropeza  MD ELENA  11/11/2019    The pt was seen and examined by myself. The case was reviewed and the plan was discussed with the KASSY.

## 2019-11-11 NOTE — H&P
[facility], [place]    History and Physical - ED Observation Service       Date of Admission:  11/11/2019    Assessment & Plan   Andrew Lockwood is a 51 year old male admitted on 11/11/2019. He with a history of HIV/AIDS (CD4 401, RNA 79 on 10/31/19), CNS toxoplasmosis, DM2, gastric ulcers and chronic abdominal pain who presented to the ED via EMS not feeling generally well and glucose in the 500's.     1. DM2 w/ Hyperglyciemia: D/c'ed a few hours ago from ED by Dr. Saenz where he p/w 's and reportedly slurred speech and not feeling well. No DKA; given 1L fluids -> 's; given insulin 6 units and was discharge home.  upon discharge. States he did not feel well so his roommate called EMS and  back to ED. Reports shaking. No fevers. States his BG usually runs in 300's at home. Takes lantus 40 units BID and short acting insulin 8 units BID because he doesn't eat well. Took lantus last night but not yesterday morning. Admits to non compliance. In ED, VSS afebrile. BMP with Na 130, glucose 560 otherwise normal. LFT's last night with ; UA negative; normal CBC except H/H 12.8/37.4. In ED the patient was given 2L NS bolus and then insulin 8 units. An hour after insulin glucose was 282. Last A1c was 10.4 on 10/17/19.   - Continue with Lantus per home regimen  - Novolog medium resistance sliding scale  - Novolog 1 unit for 15 gram CHO  - BG checks TID ac and Qhs  - Carbohydrate Consistent Diet  - Hypoglycemic protocol  - Endocrine consult    2. Headache: per chart review has been on maxalt in the past and states many years ago had migraines. Presented earlier with slurred speech. Just appears sleepy currently.  - Benadryl, Reglan, Toradol cocktail now  - CT Head due to previous report of slurred speech.     3. Cough, SOB: no tachypnea, on RA, afebrile  - CXR  - Flu/RSV PCR  - Atrovent nasal spray     Chronic Medical Problems:  # HIV/AIDs  # CNS Toxoplasmosis:  CD4 count 401,  HIV RNA 79 on 10/31/19  - Continue with PTA Biktarvy          Diet: Moderate Consistent CHO Diet    DVT Prophylaxis: Low Risk/Ambulatory with no VTE prophylaxis indicated  Potts Catheter: not present  Code Status: Full Code      Disposition Plan   Expected discharge: Tomorrow, recommended to prior living arrangement once adequate pain management/ tolerating PO medications and glucose stable, endocrine consult completed with dispo plan.  Entered: HAO Olmedo 11/11/2019, 6:43 AM     The patient's care was discussed with the Attending Physician, Dr. Rader.    HAO Olmedo  [facility]  Ascom: [id number]  Please see sticky note for cross cover information  ______________________________________________________________________    Chief Complaint   Not feeling well    History is obtained from the patient    History of Present Illness   Andrew Lockwood is a 51 year old male with a history of HIV/AIDS (CD4 401, RNA 79 on 10/31/19), CNS toxoplasmosis, DM2, gastric ulcers and chronic abdominal pain who presented to the ED via EMS not feeling generally well. He apparently is well-known to the emergency department, and comes in quite frequently with multiple complaints including hyperglycemia. Since July he has been to the ED on average 4-7 times a month. This time he was discharged a few hours ago from the ED by Dr. Saenz where he came in with a glucose in the 700's and reportedly slurred speech and not feeling well. He was not in DKA he was given 1L fluids with improvement in glucose to 500's and then was given insulin 6 units and was discharged home. Apparently on reassessment patient had no slurred speech and stable and discharged safely with a glucose of 510.    He reports that when he went home he did not feel well so his roommate called EMS and was brought back to the ED. He states he was shaking. When asked if it was chills he states he didn't know. He denies any  fevers. He admits to a new cough in the last 2 days. Admits to body aches, nasal congestion, SOB.  Reports a frontal throbbing headache, nausea and light sensitivity. Denies any chest pain.     States his BG usually runs in the 300's when he checks it at home. States he take lantus 40 units BID and short acting insulin 8 units BID and that because he doesn't eat well. States he took his lantus last night but not yesterday morning. States he forgot and admits to forgetting to take his insulin sometimes.    In the ED, HR 80's-90's, 's-130's/60's-90's, RR 16-17, SaO2 % on RA, Temp 98.3. Labs show BMP with Na 130, glucose 560 otherwise normal. LFT's last night with ; UA negative; normal CBC except H/H 12.8/37.4. In the ED the patient was given 2L NS bolus and then insulin 8 units. An hour after insulin glucose was 282. He is being admitted to the Observation Unit for glucose management.     Review of Systems    All other ROS negative except those mentioned in above note.       Past Medical History    I have reviewed this patient's medical history and updated it with pertinent information if needed.   Past Medical History:   Diagnosis Date     AIDS (H)      Allergic rhinitis due to other allergen     DNS     Chronic abdominal pain      CNS toxoplasmosis (H)      Diabetes type 2, controlled (H)      GERD (gastroesophageal reflux disease)      HIV (human immunodeficiency virus infection) (H)      Periungual wart      Sleep apnea     doesn't use CPAP       Past Surgical History   I have reviewed this patient's surgical history and updated it with pertinent information if needed.  Past Surgical History:   Procedure Laterality Date     C NONSPECIFIC PROCEDURE      right forearm fracture     COLONOSCOPY Left 1/22/2016    Procedure: COMBINED COLONOSCOPY, SINGLE OR MULTIPLE BIOPSY/POLYPECTOMY BY BIOPSY;  Surgeon: Clark Saini MD;  Location: [facility] GI     HC EXPLORE UNDESC TESTIS,INGUIN/SCROTAL        LAPAROSCOPIC APPENDECTOMY N/A 1/31/2018    Procedure: LAPAROSCOPIC APPENDECTOMY;  LAPAROSCOPIC APPENDECTOMY;  Surgeon: Dawn Holt MD;  Location: [facility] OR     LAPAROSCOPY DIAGNOSTIC (GENERAL) N/A 7/26/2016    Procedure: LAPAROSCOPY DIAGNOSTIC (GENERAL);  Surgeon: Susannah Arriaga MD;  Location: [facility] OR     LAPAROSCOPY DIAGNOSTIC (GENERAL) N/A 4/16/2018    Procedure: LAPAROSCOPY DIAGNOSTIC (GENERAL);  Diagnostic laparoscopy and lysis of adhesions;  Surgeon: Prince Dowling MD;  Location: [facility] OR     OPTICAL TRACKING SYSTEM CRANIOTOMY, EXCISE TUMOR, COMBINED Left 4/10/2015    Procedure: COMBINED OPTICAL TRACKING SYSTEM CRANIOTOMY, EXCISE TUMOR;  Surgeon: Mirlande Colmenares MD;  Location: [facility] OR     REPAIR GAMEKEEPER'S THUMB Right 12/2/2016    Procedure: REPAIR LIGAMENT ULNAR COLLATERAL THUMB (GAMEKEEPER'S);  Surgeon: Evin Zamorano MD;  Location: [facility] OR       Social History   I have reviewed this patient's social history and updated it with pertinent information if needed.  Social History     Tobacco Use     Smoking status: Current Some Day Smoker     Packs/day: 0.25     Types: Cigarettes     Last attempt to quit: 10/28/2016     Years since quitting: 3.0     Smokeless tobacco: Former User   Substance Use Topics     Alcohol use: No     Alcohol/week: 0.0 standard drinks     Comment: Last etoh in 2007     Drug use: Not Currently     Types: Marijuana       Family History   I have reviewed this patient's family history and updated it with pertinent information if needed.   Family History   Problem Relation Age of Onset     Diabetes Brother      Diabetes Father      Alzheimer Disease Father      Unknown/Adopted Mother      Diabetes Paternal Grandfather      Cancer No family hx of         no skin cancer     Skin Cancer No family hx of         no famiy hx of skin cancer     Glaucoma No family hx of      Macular Degeneration No family hx of        Prior to Admission Medications   Prior to Admission  Medications   Prescriptions Last Dose Informant Patient Reported? Taking?   HUMALOG KWIKPEN 100 UNIT/ML soln   No No   Sig: Inject 5 units with breakfast, lunch and dinner daily.   NOVOLOG FLEXPEN 100 UNIT/ML soln   No No   Sig: Inject 5 units with breakfast, lunch and dinner daily.   bictegravir-emtricitabine-tenofovir (BIKTARVY) -25 MG per tablet   No No   Sig: Take 1 tablet by mouth daily   blood glucose (NO BRAND SPECIFIED) lancets standard   No No   Sig: Use to test blood sugar four times daily or as directed.   blood glucose (NO BRAND SPECIFIED) test strip   No No   Si strip by In Vitro route 4 times daily (before meals and nightly) Use to test blood sugars 4 times daily or as directed   blood glucose monitoring (NO BRAND SPECIFIED) meter device kit   No No   Sig: Use to test blood sugar 4 times daily or as directed.   chlorhexidine (HIBICLENS) 4 % liquid   No No   Sig: Apply small amount to wash cloth and use to cleanse buttocks area during bathing 1-2 times per week   insulin glargine (BASAGLAR KWIKPEN) 100 UNIT/ML pen   Yes No   Sig: Inject 40 units subcutaneous each am and each pm.   insulin glargine (BASAGLAR KWIKPEN) 100 UNIT/ML pen   No No   Sig: Inject 40 units subcutaneous each am and 40units each pm   insulin pen needle (B-D U/F) 31G X 8 MM miscellaneous   No No   Sig: Use 1 pen needles daily or as directed.   insulin pen needle (BD PEN NEEDLE SCOTT 2ND GEN) 32G X 4 MM miscellaneous   No No   Sig: Use 3-4 daily.   naproxen (NAPROSYN) 500 MG tablet   No No   Sig: Take 1 tablet (500 mg) by mouth 2 times daily as needed for moderate pain Take with meals.   omeprazole (PRILOSEC) 20 MG CR capsule   No No   Sig: Take 1 capsule (20 mg) by mouth 2 times daily   ranitidine (ZANTAC) 150 MG tablet   No No   Sig: Take 1 tablet (150 mg) by mouth 2 times daily   sulfamethoxazole-trimethoprim (BACTRIM DS) 800-160 MG tablet   No No   Sig: Take 1 tablet by mouth 2 times daily for 7 days    sulfamethoxazole-trimethoprim (BACTRIM DS/SEPTRA DS) 800-160 MG tablet   No No   Sig: Take 1 tablet by mouth 2 times daily for 7 days      Facility-Administered Medications Last Administration Doses Remaining   lidocaine (PF) (XYLOCAINE) 1 % injection 4 mL 6/8/2019 10:59 AM    triamcinolone (KENALOG-40) injection 40 mg 6/8/2019 10:59 AM         Allergies   Allergies   Allergen Reactions     Metformin      Abdominal pain     Milk [Lac Bovis] Hives     Tylenol [Acetaminophen] Itching     Dulaglutide Rash       Physical Exam   Vital Signs: Temp: 98.3  F (36.8  C) Temp src: Oral BP: 125/69 Pulse: 91   Resp: 18 SpO2: 100 % O2 Device: None (Room air)    Weight: 159 lbs 3.2 oz    Constitutional: awake, alert, cooperative, no apparent distress, and appears stated age  Eyes: Lids and lashes normal, pupils equal, round and reactive to light, extra ocular muscles intact, sclera clear, conjunctiva normal  ENT: Normocephalic, without obvious abnormality, atraumatic, sinuses nontender on palpation, external ears without lesions, oral pharynx with moist mucous membranes, tonsils without erythema or exudates, gums normal and good dentition.  Hematologic / Lymphatic: no cervical lymphadenopathy and no supraclavicular lymphadenopathy  Respiratory: No increased work of breathing, good air exchange, clear to auscultation bilaterally, no crackles or wheezing  Cardiovascular: Normal apical impulse, regular rate and rhythm, normal S1 and S2, no S3 or S4, and no murmur noted  GI: No scars, normal bowel sounds, soft, non-distended, non-tender, no masses palpated, no hepatosplenomegally  Skin: no bruising or bleeding  Musculoskeletal: There is no redness, warmth, or swelling of the joints.  Full range of motion noted.  Motor strength is 5 out of 5 all extremities bilaterally.  Tone is normal.  Neurologic: Awake, alert, oriented to name, place and time.  Cranial nerves II-XII are grossly intact.  Motor is 5 out of 5 bilaterally.  Cerebellar  finger to nose, heel to shin intact.  Sensory is intact.  Babinski down going, Romberg negative, and gait is normal.  Neuropsychiatric: General: normal, calm and normal eye contact    Data   Data reviewed today: I reviewed all medications, new labs and imaging results over the last 24 hours.  Recent Labs   Lab 11/11/19  0417 11/10/19  2124   WBC  --  7.7   HGB  --  12.8*   MCV  --  90   PLT  --  398   * 129*   POTASSIUM 4.1 3.7   CHLORIDE 98 96   CO2 25 26   BUN 11 14   CR 0.58* 0.77   ANIONGAP 8 8   LINDA 9.1 9.1   * 745*   ALBUMIN  --  3.2*   PROTTOTAL  --  7.2   BILITOTAL  --  0.3   ALKPHOS  --  254*   ALT  --  25   AST  --  16     Most Recent 3 CBC's:  Recent Labs   Lab Test 11/10/19  2124 10/31/19  1120 10/25/19  1837   WBC 7.7 9.1 9.1   HGB 12.8* 14.0 13.5   MCV 90 90 89    340 355     Most Recent 3 BMP's:  Recent Labs   Lab Test 11/11/19  0417 11/10/19  2124 10/31/19  1120   * 129* 136   POTASSIUM 4.1 3.7 3.2*   CHLORIDE 98 96 104   CO2 25 26 26   BUN 11 14 14   CR 0.58* 0.77 0.69   ANIONGAP 8 8 7   LINDA 9.1 9.1 9.4   * 745* 150*     Most Recent 2 LFT's:  Recent Labs   Lab Test 11/10/19  2124 10/31/19  1120   AST 16 22   ALT 25 24   ALKPHOS 254* 123   BILITOTAL 0.3 0.7     Most Recent 3 Hemoglobins:  Recent Labs   Lab Test 11/10/19  2124 10/31/19  1120 10/25/19  1837   HGB 12.8* 14.0 13.5     Most Recent Hemoglobin A1c:  Recent Labs   Lab Test 10/17/19   A1C 10.4*     Most Recent 6 glucoses:  Recent Labs   Lab Test 11/11/19  0417 11/10/19  2124 10/31/19  1120 10/25/19  1837 10/11/19  0308 09/20/19  2058   * 745* 150* 435* 274* 443*     Most Recent Urinalysis:  Recent Labs   Lab Test 11/10/19  2124 10/31/19  1123   COLOR Light Yellow Yellow   APPEARANCE Clear Clear   URINEGLC >1000* Negative   URINEBILI Negative Negative   URINEKETONE Negative Negative   SG 1.031 1.017   UBLD Negative Small*   URINEPH 6.5 6.0   PROTEIN Negative Negative   NITRITE Negative Negative   LEUKEST  Negative Negative   RBCU  --  4*   WBCU  --  1     Most Recent ABG:  Recent Labs   Lab Test 04/10/15  1231   PH 7.42   PO2 220*   PCO2 32*   HCO3 21     No results found for this or any previous visit (from the past 24 hour(s)).

## 2019-11-11 NOTE — PLAN OF CARE
Outpatient/Observation goals to be met before discharge home:     - Blood Glucose greater than 70 less than 250 on two consecutive readings. -not met,  this morning and then 162 before lunch, will monitor   - Ketones absent from urine. -met  - Tolerating oral intake to maintain hydration -met  - Safe disposition plan has been identified -not met

## 2019-11-11 NOTE — PROGRESS NOTES
Andrew Lockwood is a 51 year old male patient.  1. Hyperglycemia      Past Medical History:   Diagnosis Date     AIDS (H)      Allergic rhinitis due to other allergen     DNS     Chronic abdominal pain      CNS toxoplasmosis (H)      Diabetes type 2, controlled (H)      GERD (gastroesophageal reflux disease)      HIV (human immunodeficiency virus infection) (H)      Periungual wart      Sleep apnea     doesn't use CPAP     No current outpatient medications on file.     Allergies   Allergen Reactions     Metformin      Abdominal pain     Milk [Lac Bovis] Hives     Tylenol [Acetaminophen] Itching     Dulaglutide Rash     Active Problems:    Hyperglycemia    Blood pressure 110/67, pulse 87, temperature 97.8  F (36.6  C), temperature source Oral, resp. rate 18, weight 72.2 kg (159 lb 3.2 oz), SpO2 100 %.    Subjective:  Symptoms:  Improved.  (Slurred speech and high glucose).    Diet:  Adequate intake.    Activity level: Normal.    Pain:  He reports no pain.      Objective:  General Appearance:  Comfortable.    Vital signs: (most recent): Blood pressure 110/67, pulse 87, temperature 97.8  F (36.6  C), temperature source Oral, resp. rate 18, weight 72.2 kg (159 lb 3.2 oz), SpO2 100 %.  Vital signs are normal.    Output: Producing urine.    HEENT: Normal HEENT exam.    Lungs:  Normal effort and normal respiratory rate.  Breath sounds clear to auscultation.    Heart: Normal rate.  Regular rhythm.  S1 normal and S2 normal.    Abdomen: Abdomen is soft.  Bowel sounds are normal.   There is no abdominal tenderness.     Extremities: Normal range of motion.    Pulses: Distal pulses are intact.    Neurological: Patient is alert.    Pupils:  Pupils are equal, round, and reactive to light.    Skin:  Warm.      Assessment:    Condition: In stable condition.  Improving.   (51 yom with HIV, DM presetns with HA, slurred speech and noted hyperglycemia. Also URI sx-CXR, flu swab pending.    CT head pending.    Awaiting Endocrine consult.).        Zaire Oropeza MD, MD  11/11/2019    The pt was seen and examined by myself. The case was reviewed and the plan was discussed with the KASSY.

## 2019-11-11 NOTE — PROGRESS NOTES
[facility] Progress Note - Emergency Department Observation Unit       Date of Admission:  11/11/2019  Assessment & Plan   Andrew Lockwood is a 51 year old male admitted on 11/11/2019. He with a history of HIV/AIDS (CD4 401, RNA 79 on 10/31/19), CNS toxoplasmosis, DM2, gastric ulcers and chronic abdominal pain who presented to the ED via EMS not feeling generally well and glucose in the 500's.      1. DM2 w/ Hyperglyciemia:   2. Hyponatremia: D/c'ed a few hours ago from ED by Dr. Saenz where he p/w 's and reportedly slurred speech and not feeling well. No DKA; given 1L fluids -> 's; given insulin 6 units and was discharge home.  upon discharge. States he did not feel well so his roommate called EMS and  back to ED. Reports shaking. No fevers. States his BG usually runs in 300's at home. Takes lantus 40 units BID and short acting insulin 8 units BID because he doesn't eat well. Took lantus last night but not yesterday morning. Admits to non compliance. In ED, VSS afebrile. BMP with Na 130, glucose 560 otherwise normal. LFT's last night with ; UA negative; normal CBC except H/H 12.8/37.4.   -Lantus 40 units bid, discontinued  -Lantus am dose, to be determined  - lantus 30 units at HS  - novolog prandial insulin 1 unit per15 grams of CHO ( increased to 1 unit per 10 grams of CHO starting with dinner)  - novolog medium intensity sliding scale before meals and HS, changed to high intensity sliding scale  -monitor blood sugars before meals, HS, 0200 and 0500  - Endocrine consult will continue to follow  - Repeat BMP in am     2. Headache: per chart review has been on maxalt in the past and states many years ago had migraines. Presented earlier with slurred speech. Just appears sleepy currently.CT head negative. Patient reports headache improved with migraine cocktail. Continues to be sleepy all day. May not be getting adequate sleep on friend's  couch.  - Continue to monitor for HA     3. Cough, SOB: no tachypnea, on RA, afebrile. Flu Negative. Cxray negative for infection.   - Atrovent nasal spray   - Repeat CBC in am.     Chronic Medical Problems:  # HIV/AIDs  # CNS Toxoplasmosis:  CD4 count 401, HIV RNA 79 on 10/31/19  - Continue with PTA Biktarvy          Diet: Moderate Consistent CHO Diet    DVT Prophylaxis: Low Risk/Ambulatory with no VTE prophylaxis indicated  Potts Catheter: not present  Code Status: Full Code      Disposition Plan   Expected discharge: Tomorrow, recommended to prior living arrangement once adequate pain management/ tolerating PO medications.  Entered: VIKASH Harris CNP 11/11/2019, 2:51 PM       The patient's care was discussed with the Attending Physician, Dr. Oropeza, Bedside Nurse, Care Coordinator/ and Patient.    VIKASH Tay, NP  Emergency Department  [phone] Ex [id number]  ______________________________________________________________________    Interval History    Admitted overnight     Data reviewed today: I reviewed all medications, new labs and imaging results over the last 24 hours.     Physical Exam   Vital Signs: Temp: 98.7  F (37.1  C) Temp src: Oral BP: 110/72 Pulse: 92 Heart Rate: 92 Resp: 18 SpO2: 96 % O2 Device: None (Room air)    Weight: 159 lbs 3.2 oz  Constitutional:  Arouses to voice.   Head: Normocephalic. No masses, lesions, tenderness or abnormalities   Neck: Neck supple. No adenopathy. Thyroid symmetric, normal size,, Carotids without bruits.   ENT: ENT exam normal, no neck nodes or sinus tenderness   Cardiovascular: RRR. No murmurs, clicks gallops or rub   Respiratory: . Good diaphragmatic excursion. Lungs clear   Gastrointestinal: Abdomen soft,. BS normal. No masses, organomegaly.   : Deferred   Musculoskeletal: extremities normal- no gross deformities noted, gait normal and normal muscle tone   Skin: no suspicious lesions or rashes   Neurologic: Gait normal. Reflexes normal  and symmetric. Sensation grossly WNL.   Psychiatric: mentation appears normal and affect normal/bright       Data   Recent Labs   Lab 11/11/19  0417 11/10/19  2143 11/10/19  2124   WBC  --   --  7.7   HGB  --  12.2* 12.8*   MCV  --   --  90   PLT  --   --  398   * 131* 129*   POTASSIUM 4.1 3.9 3.7   CHLORIDE 98  --  96   CO2 25  --  26   BUN 11  --  14   CR 0.58*  --  0.77   ANIONGAP 8  --  8   LINDA 9.1  --  9.1   * >700* 745*   ALBUMIN  --   --  3.2*   PROTTOTAL  --   --  7.2   BILITOTAL  --   --  0.3   ALKPHOS  --   --  254*   ALT  --   --  25   AST  --   --  16     Recent Results (from the past 24 hour(s))   CT Head w/o Contrast    Narrative    CT HEAD W/O CONTRAST 11/11/2019 8:20 AM    Provided History: Headache, acute, severe, worst HA of life    Comparison: 10/26/2019, 8/23/2019.    Technique: Using multidetector thin collimation helical acquisition  technique, axial, coronal and sagittal CT images from the skull base  to the vertex were obtained without intravenous contrast.     Findings:    No intracranial hemorrhage, mass effect, or midline shift. The  ventricles are proportionate to the cerebral sulci. The gray to white  matter differentiation of the cerebral hemispheres is preserved. The  basal cisterns are patent. Postsurgical changes of left occipital  cranioplasty, with unchanged encephalomalacia of the left cerebellar  hemisphere.    The visualized paranasal sinuses are clear. Trace mastoid fluid,  similar to prior. Chronic right lamina papyracea defect.       Impression    Impression: No acute intracranial pathology.    I have personally reviewed the examination and initial interpretation  and I agree with the findings.    CHARLENE SALAS MD   XR Chest 2 Views    Narrative    EXAM: XR CHEST 2 VW  11/11/2019 8:55 AM      HISTORY: shortness of breath    COMPARISON: CT 10/26/2019    FINDINGS: PA and lateral radiograph of the chest. The trachea is  midline. The cardiac silhouette is within  normal limits. No pleural  effusion or pneumothorax. Mild patchy bibasilar opacities. Visualized  abdomen is unremarkable. Multilevel degenerative changes in the spine.        Impression    IMPRESSION: Mild patchy bibasilar opacities suggesting atelectasis,  versus developing consolidation.    I have personally reviewed the examination and initial interpretation  and I agree with the findings.    JOSEFINA GALAN MD     Medications     [START ON 11/12/2019] no pre procedure antibiotic needed       sodium chloride 125 mL/hr at 11/11/19 1800       bictegravir-emtricitabine-tenofovir  1 tablet Oral Daily     [START ON 11/12/2019] insulin aspart   Subcutaneous Daily with breakfast     [START ON 11/12/2019] insulin aspart   Subcutaneous Daily with lunch     insulin aspart   Subcutaneous Daily with supper     insulin aspart  1-10 Units Subcutaneous TID AC     insulin aspart  1-7 Units Subcutaneous At Bedtime     insulin glargine  30 Units Subcutaneous Q24H     sodium chloride (PF)  3 mL Intracatheter Q8H

## 2019-11-11 NOTE — ED TRIAGE NOTES
Seen in ED earlier today reports he was at home and ate a donut and a whole can of coke and then after he began to feel shaky numbness to the entire face states similar episode earlier today and had the numbness in the L arm as well.  Pt states he does have a HA which he has had before similar to his migraine HA's.     Denies ETOH of drug ingestion. BG was 550 for EMS

## 2019-11-11 NOTE — PLAN OF CARE
Outpatient/Observation goals to be met before discharge home:     - Blood Glucose greater than 70 less than 250 on two consecutive readings. -not met  - Ketones absent from urine. -met  - Tolerating oral intake to maintain hydration -met  - Safe disposition plan has been identified -not met

## 2019-11-11 NOTE — ED NOTES
Bed: ED23  Expected date:   Expected time:   Means of arrival:   Comments:  [id number]   53M  BS greater than 600  Yellow

## 2019-11-11 NOTE — ED NOTES
Pt refused Q15 vitals, education was provided on importance of vitals, pt continued to remove BP cuff and pulse ox.

## 2019-11-11 NOTE — ED NOTES
"[facility], [place]   ED Nurse to Floor Handoff     Andrew Lockwood is a 51 year old male who speaks English and lives unknown,  home status is unknown  They arrived in the ED by ambulance from outside a house.  PMhx of significant for HIV, CNS toxoplasmosis, type 2 diabetes mellitus, GERD, chronic abdominal pain, anal dysplasia, and frequent ED presentations who presented to the Emergency Department on 11/10 accompanied by his roomate for evaluation of an episode of slurred speech and facial tingling. Patient states that he woke up and was watching TV when he believes he began experiencing a seizure with slurred speech and facial tingling. This episode was witnessed by his roommate who notes that this has never occurred before. Following the episode, patient states that it felt like his face was \"deflating\".  The pt received IVF and insulin 6 units subcutaneous.  Initial BG of 745 and pt was discharged home at 510.  He states he went home and later in the night ate a donut and a can of coke then began to feel shaky, drunk (despite not drinking ETOH), tremulous, and numbness to the entire face.  Pt also states he has a HA which he describes as a migraine he has had before and states it is mild/moderate at this time.     ED Chief Complaint: Hyperglycemia; Tremors; and Numbness    ED Dx;   Final diagnoses:   Hyperglycemia         Needed?: No    Allergies:   Allergies   Allergen Reactions     Metformin      Abdominal pain     Milk [Lac Bovis] Hives     Tylenol [Acetaminophen] Itching     Dulaglutide Rash   .  Past Medical Hx:   Past Medical History:   Diagnosis Date     AIDS (H)      Allergic rhinitis due to other allergen     DNS     Chronic abdominal pain      CNS toxoplasmosis (H)      Diabetes type 2, controlled (H)      GERD (gastroesophageal reflux disease)      HIV (human immunodeficiency virus infection) (H)      Periungual wart      Sleep apnea     doesn't use CPAP    "   Baseline Mental status: WDL  Current Mental Status changes: at basesline    Infection present or suspected this encounter: no  Sepsis suspected: No  Isolation type: No active isolations     Activity level - Baseline/Home:  Independent  Activity Level - Current:   Stand with Assist    Bariatric equipment needed?: No    In the ED these meds were given:   Medications   0.9% sodium chloride BOLUS (0 mLs Intravenous Stopped 11/11/19 7157)     Followed by   sodium chloride 0.9% infusion (1,000 mLs Intravenous New Bag 11/11/19 2470)   insulin aspart (NovoLOG) inj (RAPID ACTING) (has no administration in time range)       Drips running?  Yes    Home pump  No    Current LDAs  Peripheral IV 11/11/19 Right Lower forearm (Active)   Site Assessment WDL 11/11/2019  4:17 AM   Line Status Saline locked 11/11/2019  4:17 AM   Phlebitis Scale 0-->no symptoms 11/11/2019  4:17 AM   Infiltration Scale 0 11/11/2019  4:17 AM   Infiltration Site Treatment Method  None 11/11/2019  4:17 AM   Extravasation? No 11/11/2019  4:17 AM   Number of days: 0       Labs results:   Labs Ordered and Resulted from Time of ED Arrival Up to the Time of Departure from the ED   BASIC METABOLIC PANEL - Abnormal; Notable for the following components:       Result Value    Sodium 130 (*)     Glucose 560 (*)     Creatinine 0.58 (*)     All other components within normal limits       Imaging Studies: No results found for this or any previous visit (from the past 24 hour(s)).    Recent vital signs:   /77   Pulse 98   Temp 98.3  F (36.8  C) (Oral)   Resp 17   Wt 72.2 kg (159 lb 3.2 oz)   SpO2 98%   BMI 27.33 kg/m      Sheyla Coma Scale Score: 15 (11/11/19 0400)       Cardiac Rhythm: Normal Sinus  Pt needs tele? No  Skin/wound Issues: None    Code Status: Full Code    Pain control: fair    Nausea control: pt had none    Abnormal labs/tests/findings requiring intervention: see epic    Family present during ED course? No   Family Comments/Social Situation  comments: NA    Tasks needing completion: None    Vicky Riojas, RN    [phone]

## 2019-11-11 NOTE — ED PROVIDER NOTES
History     Chief Complaint   Patient presents with     Hyperglycemia     Tremors     Numbness     HPI  Andrew Lockwood is a 51 year old male who is well-known to the emergency department, he is a poor historian.  He was discharged a few hours ago from here he came in with a blood sugar of about 700 and not feeling well he was not in DKA he was given fluids and insulin his sugar came down to about 500 he went home still did not feel well so took an ambulance back here.  His blood sugar is 570 again not in DKA.  He has a history of HIV he is medication noncompliant as well.      Past Medical History:   Diagnosis Date     AIDS (H)      Allergic rhinitis due to other allergen     DNS     Chronic abdominal pain      CNS toxoplasmosis (H)      Diabetes type 2, controlled (H)      GERD (gastroesophageal reflux disease)      HIV (human immunodeficiency virus infection) (H)      Periungual wart      Sleep apnea     doesn't use CPAP       Social History     Socioeconomic History     Marital status: Single     Spouse name: Not on file     Number of children: Not on file     Years of education: Not on file     Highest education level: Not on file   Occupational History     Occupation:      Comment: 5 years; temp agency   Social Needs     Financial resource strain: Not on file     Food insecurity:     Worry: Not on file     Inability: Not on file     Transportation needs:     Medical: Not on file     Non-medical: Not on file   Tobacco Use     Smoking status: Current Some Day Smoker     Packs/day: 0.25     Types: Cigarettes     Last attempt to quit: 10/28/2016     Years since quitting: 3.0     Smokeless tobacco: Former User   Substance and Sexual Activity     Alcohol use: No     Alcohol/week: 0.0 standard drinks     Comment: Last etoh in 2007     Drug use: Not Currently     Types: Marijuana     Sexual activity: Not Currently     Partners: Female, Male     Comment: Last sexual activity 2008   Lifestyle     Physical  activity:     Days per week: Not on file     Minutes per session: Not on file     Stress: Not on file   Relationships     Social connections:     Talks on phone: Not on file     Gets together: Not on file     Attends Mosque service: Not on file     Active member of club or organization: Not on file     Attends meetings of clubs or organizations: Not on file     Relationship status: Not on file     Intimate partner violence:     Fear of current or ex partner: Not on file     Emotionally abused: Not on file     Physically abused: Not on file     Forced sexual activity: Not on file   Other Topics Concern     Parent/sibling w/ CABG, MI or angioplasty before 65F 55M? Not Asked   Social History Narrative    Born in [facility].  [place] the [place] in [place].  Last traveled to visit family in 2008.        1/7/2019 - Homeless. Working with a  at [employer] trying to get housing. Sexually active with two partners recently using condoms 100% of the time. Smokes occasionally, not for the last 4 weeks.         I have reviewed the Medications, Allergies, Past Medical and Surgical History, and Social History in the Epic system.    Review of Systems   Constitutional: Positive for fatigue.   Respiratory: Negative.    Cardiovascular: Negative.    Gastrointestinal: Negative.    Musculoskeletal: Positive for myalgias.   Neurological: Positive for tremors and weakness.   All other systems reviewed and are negative.      Physical Exam   BP: 136/83  Pulse: 98  Temp: 98.3  F (36.8  C)  Resp: 16  Weight: 72.2 kg (159 lb 3.2 oz)  SpO2: 100 %      Physical Exam  Vitals signs and nursing note reviewed.   Constitutional:       General: He is not in acute distress.     Appearance: He is well-developed.      Comments: somnolent   HENT:      Head: Normocephalic and atraumatic.   Eyes:      General: No scleral icterus.     Conjunctiva/sclera: Conjunctivae normal.      Pupils: Pupils are equal, round, and reactive to light.   Neck:       Musculoskeletal: Neck supple.   Cardiovascular:      Rate and Rhythm: Normal rate and regular rhythm.      Heart sounds: Normal heart sounds.   Pulmonary:      Effort: Pulmonary effort is normal. No respiratory distress.      Breath sounds: Normal breath sounds. No wheezing.   Abdominal:      General: Abdomen is flat.      Palpations: Abdomen is soft.   Skin:     General: Skin is warm and dry.   Neurological:      Mental Status: He is oriented to person, place, and time.      Cranial Nerves: No cranial nerve deficit.   Psychiatric:         Attention and Perception: He is inattentive.         Mood and Affect: Affect is flat.         Speech: Speech is delayed.         Behavior: Behavior is withdrawn.         ED Course        Procedures        Medications   0.9% sodium chloride BOLUS (0 mLs Intravenous Stopped 11/11/19 0521)     Followed by   sodium chloride 0.9% infusion (1,000 mLs Intravenous New Bag 11/11/19 0500)   insulin aspart (NovoLOG) inj (RAPID ACTING) (has no administration in time range)            Labs Ordered and Resulted from Time of ED Arrival Up to the Time of Departure from the ED   BASIC METABOLIC PANEL - Abnormal; Notable for the following components:       Result Value    Sodium 130 (*)     Glucose 560 (*)     Creatinine 0.58 (*)     All other components within normal limits            Assessments & Plan (with Medical Decision Making)   Patient well-known to the emergency department he is an insulin-dependent diabetic with medication noncompliance seen here earlier tonight given fluids and insulin and discharged back home he returns saying that he does not feel well his blood sugar was 560 without DKA.  He was given fluids and insulin and will be admitted to observation for definitive management of his hyperglycemia.    I have reviewed the nursing notes.    I have reviewed the findings, diagnosis, plan and need for follow up with the patient.    New Prescriptions    No medications on file        Final diagnoses:   Hyperglycemia       11/11/2019   [facility], [place], EMERGENCY DEPARTMENT     Thanh Lehman MD  11/11/19 9691

## 2019-11-11 NOTE — ED TRIAGE NOTES
BIBA for altered mental status, hyperglycemia, left side facial tingling. Pt states he believes he had a seizure. Pt A and Ox4 on arrival.

## 2019-11-11 NOTE — PROGRESS NOTES
Social Work Services Progress Note    Hospital Day: 1  Date of Initial Social Work Evaluation:  Not completed  Collaborated with:  Pt    Data:  SW following 51 year old male pt for discharge planning needs.     Intervention:  Per rounds, pt is currently homeless. Reviewed pt's chart, pt has been homeless in past. Pt has community CM and works with [employer].   Met with pt and introduced self and role on treatment team. Pt reports that he is currently staying on the streets, but has been staying at a friends home. Pt stated he plans on returning to his friends home upon discharge.     Assessment:  Pt accpeting of discharge plan.     Plan:    Anticipated Disposition:  Home, no needs identified    Barriers to d/c plan:  Medical stability     Follow Up:  SW available as needed.    RADHA Valle, ALFONSOSW  6D Adult Acute Care SW  Ph:[phone]  Pager [phone]  Unit [phone]
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
## 2019-11-11 NOTE — ED NOTES
Bed: ED04  Expected date:   Expected time:   Means of arrival:   Comments:  H421 51 M  Hyperglycemia

## 2019-11-11 NOTE — ED PROVIDER NOTES
"    [place] EMERGENCY DEPARTMENT ([facility])  11/10/19    History     Chief Complaint   Patient presents with     Hyperglycemia     The history is provided by the patient and medical records. The history is limited by the condition of the patient.     Andrew Lockwood is a 51 year old male with a past medical history significant for HIV, CNS toxoplasmosis, type 2 diabetes mellitus, GERD, chronic abdominal pain, anal dysplasia, and frequent ED presentations who presents to the Emergency Department accompanied by his roomate for evaluation of an episode of slurred speech and facial tingling. Patient states that he woke up and was watching TV when he believes he began experiencing a seizure with slurred speech and facial tingling. This episode was witnessed by his roommate who notes that this has never occurred before. Following the episode, patient states that it felt like his face was \"deflating\".     I have reviewed the Medications, Allergies, Past Medical and Surgical History, and Social History in the Epic system.    Review of Systems   Neurological: Positive for speech difficulty.       Physical Exam   BP: (!) 162/100  Pulse: 101  Temp: 98.3  F (36.8  C)  Resp: 14  Height: 162.6 cm (5' 4\")  Weight: 74.8 kg (165 lb)  SpO2: 98 %      Physical Exam  Constitutional:       General: He is not in acute distress.     Appearance: He is well-developed. He is not diaphoretic.      Comments: Comfortably resting, lying in bed, NAD, nondiaphoretic, lucid, fully conversant, no  respiratory distress, alert and oriented.     HENT:      Head: Normocephalic and atraumatic.   Eyes:      General: No scleral icterus.     Pupils: Pupils are equal, round, and reactive to light.   Neck:      Musculoskeletal: Normal range of motion and neck supple.   Cardiovascular:      Rate and Rhythm: Normal rate.      Heart sounds: Normal heart sounds.   Pulmonary:      Effort: Pulmonary effort is normal. No respiratory distress.      Breath " sounds: Normal breath sounds.   Abdominal:      Palpations: Abdomen is soft.      Tenderness: There is no tenderness.   Musculoskeletal:         General: No tenderness.   Skin:     General: Skin is warm and dry.      Capillary Refill: Capillary refill takes less than 2 seconds.      Findings: No rash.   Neurological:      General: No focal deficit present.      Mental Status: He is alert and oriented to person, place, and time.      Cranial Nerves: No cranial nerve deficit.         ED Course   9:26 PM  The patient was seen and examined by Torres Saenz MD in Room ED23.        Procedures             Critical Care time:  none         Results for orders placed or performed during the hospital encounter of 11/10/19   CBC with platelets differential     Status: Abnormal   Result Value Ref Range    WBC 7.7 4.0 - 11.0 10e9/L    RBC Count 4.18 (L) 4.4 - 5.9 10e12/L    Hemoglobin 12.8 (L) 13.3 - 17.7 g/dL    Hematocrit 37.4 (L) 40.0 - 53.0 %    MCV 90 78 - 100 fl    MCH 30.6 26.5 - 33.0 pg    MCHC 34.2 31.5 - 36.5 g/dL    RDW 12.3 10.0 - 15.0 %    Platelet Count 398 150 - 450 10e9/L    Diff Method Automated Method     % Neutrophils 56.8 %    % Lymphocytes 32.5 %    % Monocytes 6.9 %    % Eosinophils 2.5 %    % Basophils 0.5 %    % Immature Granulocytes 0.8 %    Nucleated RBCs 0 0 /100    Absolute Neutrophil 4.4 1.6 - 8.3 10e9/L    Absolute Lymphocytes 2.5 0.8 - 5.3 10e9/L    Absolute Monocytes 0.5 0.0 - 1.3 10e9/L    Absolute Eosinophils 0.2 0.0 - 0.7 10e9/L    Absolute Basophils 0.0 0.0 - 0.2 10e9/L    Abs Immature Granulocytes 0.1 0 - 0.4 10e9/L    Absolute Nucleated RBC 0.0    Comprehensive metabolic panel     Status: Abnormal   Result Value Ref Range    Sodium 129 (L) 133 - 144 mmol/L    Potassium 3.7 3.4 - 5.3 mmol/L    Chloride 96 94 - 109 mmol/L    Carbon Dioxide 26 20 - 32 mmol/L    Anion Gap 8 3 - 14 mmol/L    Glucose 745 (HH) 70 - 99 mg/dL    Urea Nitrogen 14 7 - 30 mg/dL    Creatinine 0.77 0.66 - 1.25 mg/dL     GFR Estimate >90 >60 mL/min/[1.73_m2]    GFR Estimate If Black >90 >60 mL/min/[1.73_m2]    Calcium 9.1 8.5 - 10.1 mg/dL    Bilirubin Total 0.3 0.2 - 1.3 mg/dL    Albumin 3.2 (L) 3.4 - 5.0 g/dL    Protein Total 7.2 6.8 - 8.8 g/dL    Alkaline Phosphatase 254 (H) 40 - 150 U/L    ALT 25 0 - 70 U/L    AST 16 0 - 45 U/L   UA reflex to Microscopic     Status: Abnormal   Result Value Ref Range    Color Urine Light Yellow     Appearance Urine Clear     Glucose Urine >1000 (A) NEG^Negative mg/dL    Bilirubin Urine Negative NEG^Negative    Ketones Urine Negative NEG^Negative mg/dL    Specific Gravity Urine 1.031 1.003 - 1.035    Blood Urine Negative NEG^Negative    pH Urine 6.5 5.0 - 7.0 pH    Protein Albumin Urine Negative NEG^Negative mg/dL    Urobilinogen mg/dL Normal 0.0 - 2.0 mg/dL    Nitrite Urine Negative NEG^Negative    Leukocyte Esterase Urine Negative NEG^Negative    Source Midstream Urine    Glucose by meter     Status: Abnormal   Result Value Ref Range    Glucose 545 (HH) 70 - 99 mg/dL   Glucose by meter     Status: Abnormal   Result Value Ref Range    Glucose 510 (HH) 70 - 99 mg/dL            Labs Ordered and Resulted from Time of ED Arrival Up to the Time of Departure from the ED   CBC WITH PLATELETS DIFFERENTIAL - Abnormal; Notable for the following components:       Result Value    RBC Count 4.18 (*)     Hemoglobin 12.8 (*)     Hematocrit 37.4 (*)     All other components within normal limits   COMPREHENSIVE METABOLIC PANEL - Abnormal; Notable for the following components:    Sodium 129 (*)     Glucose 745 (*)     Albumin 3.2 (*)     Alkaline Phosphatase 254 (*)     All other components within normal limits   URINE MACROSCOPIC WITH REFLEX TO MICRO - Abnormal; Notable for the following components:    Glucose Urine >1000 (*)     All other components within normal limits   GLUCOSE BY METER - Abnormal; Notable for the following components:    Glucose 545 (*)     All other components within normal limits   GLUCOSE  BY METER - Abnormal; Notable for the following components:    Glucose 510 (*)     All other components within normal limits   ISTAT GAS OR ELECTROLYTE NURSING POCT         I have reviewed the patient's prior chart including recent labs, ER visits, and available inpatient discharge summaries if available.      Assessments & Plan (with Medical Decision Making)   This is a 51-year-old male well-known to the emergency room is presenting today with what he describes as slurred speech.  Evidently he was in his home this evening and developed a headache and some slurred speech and went into his roommates room and they brought him to the emergency room.  On evaluation here in the emergency room he has a very pressured attempt of slurring his speech.  He has no focal deficits and is otherwise resting comfortably.  On lab assessment he has an elevated blood glucose.  This is not unusual as the patient has a history of noncompliance with his medication.  He is provided with IV fluids and insulin here in the emergency room with dramatic improvement.  On reassessment he is not slurring his speech and is otherwise doing well.  His vital signs are otherwise reassuring and I believe he can be safely discharged with instructions to maintain good compliance with his medication.    Pt was discharged home/self-care.  PT was provided written discharge instructions. Additionally verbal instructions were given and discussed with patient.  PT was asked to return to the ED immediately for any new or concerning symptoms.  Pt was in agreement, endorsed understanding, and questions were answered.     I have reviewed the nursing notes.    I have reviewed the findings, diagnosis, plan and need for follow up with the patient.    New Prescriptions    No medications on file       Final diagnoses:   Hyperglycemia     I, Tramaine Mccord, am serving as a trained medical scribe to document services personally performed by Torres Saenz MD, based on the  "provider's statements to me.     I, Torres Saenz MD, was physically present and have reviewed and verified the accuracy of this note documented by Tramaine Mccord.    \"This dictation was performed with the assistance of voice recognition software and may contain inadvertant transcription  errors,  omissions and/or  inadvertent word substitution.\" --Torres Saenz MD     11/10/2019   [facility], [place], EMERGENCY DEPARTMENT     Torres Saenz MD  11/11/19 0120    "

## 2019-11-12 VITALS
BODY MASS INDEX: 27.33 KG/M2 | RESPIRATION RATE: 16 BRPM | OXYGEN SATURATION: 98 % | SYSTOLIC BLOOD PRESSURE: 125 MMHG | HEART RATE: 92 BPM | DIASTOLIC BLOOD PRESSURE: 81 MMHG | TEMPERATURE: 98.4 F | WEIGHT: 159.2 LBS

## 2019-11-12 LAB
AMPHETAMINES UR QL SCN: POSITIVE
ANION GAP SERPL CALCULATED.3IONS-SCNC: 4 MMOL/L (ref 3–14)
BARBITURATES UR QL: NEGATIVE
BENZODIAZ UR QL: NEGATIVE
BUN SERPL-MCNC: 9 MG/DL (ref 7–30)
CALCIUM SERPL-MCNC: 8.3 MG/DL (ref 8.5–10.1)
CANNABINOIDS UR QL SCN: NEGATIVE
CHLORIDE SERPL-SCNC: 110 MMOL/L (ref 94–109)
CO2 SERPL-SCNC: 25 MMOL/L (ref 20–32)
COCAINE UR QL: NEGATIVE
CREAT SERPL-MCNC: 0.62 MG/DL (ref 0.66–1.25)
ERYTHROCYTE [DISTWIDTH] IN BLOOD BY AUTOMATED COUNT: 12.5 % (ref 10–15)
ETHANOL UR QL SCN: NEGATIVE
GFR SERPL CREATININE-BSD FRML MDRD: >90 ML/MIN/{1.73_M2}
GLUCOSE BLDC GLUCOMTR-MCNC: 116 MG/DL (ref 70–99)
GLUCOSE BLDC GLUCOMTR-MCNC: 205 MG/DL (ref 70–99)
GLUCOSE SERPL-MCNC: 99 MG/DL (ref 70–99)
HCT VFR BLD AUTO: 36.9 % (ref 40–53)
HGB BLD-MCNC: 12.3 G/DL (ref 13.3–17.7)
MAGNESIUM SERPL-MCNC: 1.7 MG/DL (ref 1.6–2.3)
MCH RBC QN AUTO: 30.1 PG (ref 26.5–33)
MCHC RBC AUTO-ENTMCNC: 33.3 G/DL (ref 31.5–36.5)
MCV RBC AUTO: 90 FL (ref 78–100)
OPIATES UR QL SCN: NEGATIVE
PCP UR QL SCN: NEGATIVE
PLATELET # BLD AUTO: 354 10E9/L (ref 150–450)
POTASSIUM SERPL-SCNC: 3.2 MMOL/L (ref 3.4–5.3)
RBC # BLD AUTO: 4.09 10E12/L (ref 4.4–5.9)
SODIUM SERPL-SCNC: 139 MMOL/L (ref 133–144)
WBC # BLD AUTO: 7.2 10E9/L (ref 4–11)

## 2019-11-12 PROCEDURE — G0378 HOSPITAL OBSERVATION PER HR: HCPCS

## 2019-11-12 PROCEDURE — 80048 BASIC METABOLIC PNL TOTAL CA: CPT | Performed by: NURSE PRACTITIONER

## 2019-11-12 PROCEDURE — 85027 COMPLETE CBC AUTOMATED: CPT | Performed by: NURSE PRACTITIONER

## 2019-11-12 PROCEDURE — 25000132 ZZH RX MED GY IP 250 OP 250 PS 637: Performed by: PHYSICIAN ASSISTANT

## 2019-11-12 PROCEDURE — 36415 COLL VENOUS BLD VENIPUNCTURE: CPT | Performed by: NURSE PRACTITIONER

## 2019-11-12 PROCEDURE — 80320 DRUG SCREEN QUANTALCOHOLS: CPT | Performed by: NURSE PRACTITIONER

## 2019-11-12 PROCEDURE — 25000132 ZZH RX MED GY IP 250 OP 250 PS 637: Performed by: NURSE PRACTITIONER

## 2019-11-12 PROCEDURE — 00000146 ZZHCL STATISTIC GLUCOSE BY METER IP

## 2019-11-12 PROCEDURE — 84132 ASSAY OF SERUM POTASSIUM: CPT | Performed by: NURSE PRACTITIONER

## 2019-11-12 PROCEDURE — 80359 METHYLENEDIOXYAMPHETAMINES: CPT | Performed by: NURSE PRACTITIONER

## 2019-11-12 PROCEDURE — 25000131 ZZH RX MED GY IP 250 OP 636 PS 637: Performed by: NURSE PRACTITIONER

## 2019-11-12 PROCEDURE — 80307 DRUG TEST PRSMV CHEM ANLYZR: CPT | Performed by: NURSE PRACTITIONER

## 2019-11-12 PROCEDURE — 80324 DRUG SCREEN AMPHETAMINES 1/2: CPT | Performed by: NURSE PRACTITIONER

## 2019-11-12 PROCEDURE — 99217 ZZC OBSERVATION CARE DISCHARGE: CPT | Mod: Z6 | Performed by: INTERNAL MEDICINE

## 2019-11-12 PROCEDURE — 96372 THER/PROPH/DIAG INJ SC/IM: CPT

## 2019-11-12 PROCEDURE — 83735 ASSAY OF MAGNESIUM: CPT | Performed by: NURSE PRACTITIONER

## 2019-11-12 RX ORDER — POTASSIUM CHLORIDE 750 MG/1
20-40 TABLET, EXTENDED RELEASE ORAL
Status: DISCONTINUED | OUTPATIENT
Start: 2019-11-12 | End: 2019-11-12 | Stop reason: HOSPADM

## 2019-11-12 RX ORDER — POTASSIUM CHLORIDE 7.45 MG/ML
10 INJECTION INTRAVENOUS
Status: DISCONTINUED | OUTPATIENT
Start: 2019-11-12 | End: 2019-11-12 | Stop reason: HOSPADM

## 2019-11-12 RX ORDER — INSULIN ASPART 100 [IU]/ML
INJECTION, SOLUTION INTRAVENOUS; SUBCUTANEOUS
Qty: 15 ML | Refills: 3 | Status: ON HOLD | OUTPATIENT
Start: 2019-11-12 | End: 2020-01-16

## 2019-11-12 RX ORDER — POTASSIUM CL/LIDO/0.9 % NACL 10MEQ/0.1L
10 INTRAVENOUS SOLUTION, PIGGYBACK (ML) INTRAVENOUS
Status: DISCONTINUED | OUTPATIENT
Start: 2019-11-12 | End: 2019-11-12 | Stop reason: HOSPADM

## 2019-11-12 RX ORDER — POTASSIUM CHLORIDE 1.5 G/1.58G
20-40 POWDER, FOR SOLUTION ORAL
Status: DISCONTINUED | OUTPATIENT
Start: 2019-11-12 | End: 2019-11-12 | Stop reason: HOSPADM

## 2019-11-12 RX ADMIN — BICTEGRAVIR SODIUM, EMTRICITABINE, AND TENOFOVIR ALAFENAMIDE FUMARATE 1 TABLET: 50; 200; 25 TABLET ORAL at 08:05

## 2019-11-12 RX ADMIN — INSULIN GLARGINE 30 UNITS: 100 INJECTION, SOLUTION SUBCUTANEOUS at 08:53

## 2019-11-12 RX ADMIN — POTASSIUM CHLORIDE 20 MEQ: 750 TABLET, EXTENDED RELEASE ORAL at 08:52

## 2019-11-12 RX ADMIN — ACETAMINOPHEN 650 MG: 325 TABLET, FILM COATED ORAL at 08:05

## 2019-11-12 RX ADMIN — POTASSIUM CHLORIDE 40 MEQ: 750 TABLET, EXTENDED RELEASE ORAL at 06:55

## 2019-11-12 ASSESSMENT — ENCOUNTER SYMPTOMS
DIETARY ISSUES: ADEQUATE INTAKE
NO PATIENT REPORTED PAIN: 1
ACTIVITY IMPAIRMENT: NORMAL

## 2019-11-12 NOTE — PLAN OF CARE
Outpatient/Observation goals to be met before discharge home:      - Blood Glucose greater than 70 less than 250 on two consecutive readings. -met, BG of 162 and 233     - Ketones absent from urine. -met  - Tolerating oral intake to maintain hydration -met  - Safe disposition plan has been identified -not met

## 2019-11-12 NOTE — PLAN OF CARE
Outpatient/Observation goals to be met before discharge home:      - Blood Glucose greater than 70 less than 250 on two consecutive readings. -met, 230 and 205     - Ketones absent from urine. -met  - Tolerating oral intake to maintain hydration -met  - Safe disposition plan has been identified -not met

## 2019-11-12 NOTE — PLAN OF CARE
Outpatient/Observation goals to be met before discharge home:      - Blood Glucose greater than 70 less than 250 on two consecutive readings. -met, BG of 162 and 233      - Ketones absent from urine. -met  - Tolerating oral intake to maintain hydration -met  - Safe disposition plan has been identified -not met     Pt very tired/lethargic, but oriented when woken up. C/o of headache, prn med given. IV infusing. Will continue to monitor.

## 2019-11-12 NOTE — PLAN OF CARE
Observation Goals:   List all goals to be met before discharge home:     - Blood Glucose greater than 70 less than 250 on two consecutive readings: met, last two readings 99 &116   - Ketones absent from urine: met   - Tolerating oral intake to maintain hydration: pt has IV fluids running at 125ml/hr   - Safe disposition plan has been identified: pending     Nurse to notify provider when observation goals have been met and patient is ready for discharge.

## 2019-11-12 NOTE — PROGRESS NOTES
Andrew Lockwood is a 51 year old male patient.  1. Hyperglycemia      Past Medical History:   Diagnosis Date     AIDS (H)      Allergic rhinitis due to other allergen     DNS     Chronic abdominal pain      CNS toxoplasmosis (H)      Diabetes type 2, controlled (H)      GERD (gastroesophageal reflux disease)      HIV (human immunodeficiency virus infection) (H)      Periungual wart      Sleep apnea     doesn't use CPAP     No current outpatient medications on file.     Allergies   Allergen Reactions     Metformin      Abdominal pain     Milk [Lac Bovis] Hives     Tylenol [Acetaminophen] Itching     Dulaglutide Rash     Active Problems:    Hyperglycemia    Blood pressure 113/72, pulse 92, temperature 97.8  F (36.6  C), temperature source Oral, resp. rate 18, weight 72.2 kg (159 lb 3.2 oz), SpO2 100 %.    Subjective:  Symptoms:  Stable.  He reports headache.    Diet:  Adequate intake.    Activity level: Normal.    Pain:  He reports no pain.      Objective:  General Appearance:  Comfortable.    Vital signs: (most recent): Blood pressure 113/72, pulse 92, temperature 97.8  F (36.6  C), temperature source Oral, resp. rate 18, weight 72.2 kg (159 lb 3.2 oz), SpO2 100 %.  Vital signs are normal.    Output: Producing urine.    HEENT: Normal HEENT exam.    Lungs:  Normal effort and normal respiratory rate.  Breath sounds clear to auscultation.    Heart: Normal rate.  Regular rhythm.  S1 normal and S2 normal.    Abdomen: Abdomen is soft.  Bowel sounds are normal.   There is no abdominal tenderness.     Extremities: Normal range of motion.    Pulses: Distal pulses are intact.    Neurological: Patient is alert.    Pupils:  Pupils are equal, round, and reactive to light.    Skin:  Warm.      Assessment:    Condition: In stable condition.  Improving.   (51 yom h/o HIV but most recent CD4 count 401 in 10/2019 presents with high glu, HA slurred speech. Sleeping a lot but now awake and complaining of HA again. CT head neg, no fever.  Tylenol given.    Glu better control, appreciate Endocrine input.).       Zaire Oropeza MD, MD  11/12/2019    The pt was seen and examined by meself. The case was reviewed and the plan was discussed with the [facility].

## 2019-11-12 NOTE — CONSULTS
Diabetes Education  Received consult request to see this 51 year old male.  Patient presented to ED with hyperglycemia, currently on observation unit.  Per diabetes management team, patient has lost/misplaced with blood glucose monitor; patient currently homeless.    Provided patient with a One Touch Verio Flex monitor kit; he verifies this is the same brand he previously had, and does not need instruction on use.    At discharge, will need prescriptions for:  1.  One Touch Verio test strips  2.  One Touch Delica lancets    Sharlene Kelsey MS, APRN, CNS, CDE, CDTC  [phone]

## 2019-11-12 NOTE — PROGRESS NOTES
Pt left the unit before getting discharge paperwork. Provider had spoken with patient regarding discharge and answered his question.

## 2019-11-12 NOTE — DISCHARGE SUMMARY
[facility], [place]  Hospitalist Discharge Summary       Date of Admission:  11/11/2019  Date of Discharge:  11/12/2019  Discharging Provider: VIKASH Harris CNP  Discharge Team: Emergency Department Observation Unit    Discharge Diagnoses   Hyperglycemia    Follow-ups Needed After Discharge   Follow-up Appointments     Adult [facility] Follow-up and recommended labs and tests      Outpatient diabetes follow up: Ashley venegas PA-C in 2 weeks. Diabetes   clinic phone number: [phone]    Appointments on [place] and/or [facility] (with [facility] or [facility]   provider or service). Call [phone] if you haven't heard regarding   these appointments within 7 days of discharge.         {Additional follow-up instructions/to-do's for PCP    : Hospital follow up.     Unresulted Labs Ordered in the Past 30 Days of this Admission     Date and Time Order Name Status Description    11/12/2019 0713 Drug abuse screen 8 urine (UR) In process       These results will be followed up by PCP    Discharge Disposition   Discharged to home  Condition at discharge: Stable    Hospital Course   Andrew Lockwood is a 51 year old male admitted on 11/11/2019. He with a history of HIV/AIDS (CD4 401, RNA 79 on 10/31/19), CNS toxoplasmosis, DM2, gastric ulcers and chronic abdominal pain who presented to the ED via EMS not feeling generally well and glucose in the 500's.      1. DM2 w/ Hyperglycemia:    D/c'ed a few hours ago from ED by Dr. Saenz where he p/w 's and reportedly slurred speech and not feeling well. /193/230/205/99 No DKA; given 1L fluids -> 's; given insulin 6 units and was discharge home.  upon discharge. States he did not feel well so his roommate called EMS and  back to ED. Reports shaking. No fevers. States his BG usually runs in 300's at home. Takes lantus 40 units BID and short acting insulin 8 units BID because he doesn't eat well. Took lantus last night but not  yesterday morning. Admits to non compliance. In ED, VSS afebrile. BMP with Na 139; UA negative. Per Endocrine recs, Lantus 40 mg BID was discontinued Patient received 30 units last night. This am, Endocrine recommended lantus 30 units twice daily   Novolog 8 units with meals. Patient was seen by the diabetic nurse educator who taught patient how to use a new glucometer and was sent home with it. Also sent home with new insulin pens. Outpatient diabetes follow up: Ashley venegas PA-C in 2 weeks.     2. Headache: Reports headache has improved this morning. Notes he has been unable to sleep well at a friends house and was able to get sleep during his hospital stay.  Presented earlier with slurred speech. Just appears sleepy currently.CT head negative.      3. Cough, SOB: no tachypnea, on RA, afebrile. Flu Negative. Cxray negative for infection. WBC normal. No fever.      Chronic Medical Problems:  # HIV/AIDs  # CNS Toxoplasmosis:  CD4 count 401, HIV RNA 79 on 10/31/19  - Continue with PTA Bikt    Consultations This Hospital Stay   CNS DIABETES IP CONSULT  ENDOCRINE DIABETES ADULT IP CONSULT    Code Status   Full Code    Time Spent on this Encounter   I, VIKASH Harris CNP, personally saw the patient today and spent less than or equal to 30 minutes discharging this patient.       VIKASH Harris CNP  [facility], [place]  ______________________________________________________________________    Physical Exam   Vital Signs: Temp: 98.4  F (36.9  C) Temp src: Oral BP: 125/81   Heart Rate: 54 Resp: 16 SpO2: 98 % O2 Device: None (Room air)    Weight: 159 lbs 3.2 oz  Constitutional:  Arouses to voice.   Head: Normocephalic. No masses, lesions, tenderness or abnormalities   Neck: Neck supple. No adenopathy. Thyroid symmetric, normal size,, Carotids without bruits.   ENT: ENT exam normal, no neck nodes or sinus tenderness   Cardiovascular: RRR. No murmurs, clicks gallops or  rub   Respiratory: . Good diaphragmatic excursion. Lungs clear   Gastrointestinal: Abdomen soft,. BS normal. No masses, organomegaly.   : Deferred   Musculoskeletal: extremities normal- no gross deformities noted, gait normal and normal muscle tone   Skin: no suspicious lesions or rashes   Neurologic: Gait normal. Reflexes normal and symmetric. Sensation grossly WNL.   Psychiatric: mentation appears normal and affect normal/bright        Primary Care Physician   Caitie Lamb    Discharge Orders      Reason for your hospital stay    High blood sugars     Adult [facility] Follow-up and recommended labs and tests    Outpatient diabetes follow up: Ashley venegas PA-C in 2 weeks. Diabetes clinic phone number: [phone]    Appointments on [place] and/or [facility] (with [facility] or [facility] provider or service). Call [phone] if you haven't heard regarding these appointments within 7 days of discharge.     Activity    Your activity upon discharge: activity as tolerated     When to contact your care team    Call your healthcare provider right away if any of these occur:    Excessive thirst or hunger    Frequent need to urinate    Losing weight without trying    Feeling tired    Nausea or vomiting    Itchy, dry skin    Dizziness    Confusion    Abdominal pain    Fruity-smelling breath    Deep or rapid breathing     Discharge Instructions    You were admitted to the ED observation unit at the [facility] for high blood sugars. You were seen by the diabetes team who changed your Lantus dose to 30 units twice a day and Also your Novolog dose to 8 units with meals. Please follow up with  Outpatient diabetes clinic: Ashley venegas PA-C in 2 weeks  Clinic phone number: [phone]    You reported a headache. Head CT was normal. Recommend you continue to drink plenty of fluids and stay well hydrated. Eat a well balanced diet when you are able.     You reported a cough and shortness of breath. Your flu swab was negative  and your chest xray was negative for infection.       Diet    Follow this diet upon discharge: Diabetic diet       Significant Results and Procedures   Results for orders placed or performed during the hospital encounter of 11/11/19   XR Chest 2 Views    Narrative    EXAM: XR CHEST 2 VW  11/11/2019 8:55 AM      HISTORY: shortness of breath    COMPARISON: CT 10/26/2019    FINDINGS: PA and lateral radiograph of the chest. The trachea is  midline. The cardiac silhouette is within normal limits. No pleural  effusion or pneumothorax. Mild patchy bibasilar opacities. Visualized  abdomen is unremarkable. Multilevel degenerative changes in the spine.        Impression    IMPRESSION: Mild patchy bibasilar opacities suggesting atelectasis,  versus developing consolidation.    I have personally reviewed the examination and initial interpretation  and I agree with the findings.    JOSEFINA GALAN MD   CT Head w/o Contrast    Narrative    CT HEAD W/O CONTRAST 11/11/2019 8:20 AM    Provided History: Headache, acute, severe, worst HA of life    Comparison: 10/26/2019, 8/23/2019.    Technique: Using multidetector thin collimation helical acquisition  technique, axial, coronal and sagittal CT images from the skull base  to the vertex were obtained without intravenous contrast.     Findings:    No intracranial hemorrhage, mass effect, or midline shift. The  ventricles are proportionate to the cerebral sulci. The gray to white  matter differentiation of the cerebral hemispheres is preserved. The  basal cisterns are patent. Postsurgical changes of left occipital  cranioplasty, with unchanged encephalomalacia of the left cerebellar  hemisphere.    The visualized paranasal sinuses are clear. Trace mastoid fluid,  similar to prior. Chronic right lamina papyracea defect.       Impression    Impression: No acute intracranial pathology.    I have personally reviewed the examination and initial interpretation  and I agree with the  findings.    CHARLENE SALAS MD     *Note: Due to a large number of results and/or encounters for the requested time period, some results have not been displayed. A complete set of results can be found in Results Review.       Discharge Medications   Current Discharge Medication List      CONTINUE these medications which have CHANGED    Details   insulin glargine (LANTUS PEN) 100 UNIT/ML pen Inject 30 Units Subcutaneous 2 times daily  Qty: 3 mL, Refills: 3    Comments: If Lantus is not covered by insurance, may substitute Basaglar at same dose and frequency.    Associated Diagnoses: Hyperglycemia      NOVOLOG FLEXPEN 100 UNIT/ML soln Inject 8 units prior to breakfast, lunch and dinner daily.  Qty: 15 mL, Refills: 3    Associated Diagnoses: Type 2 diabetes mellitus with hyperglycemia, without long-term current use of insulin (H)         CONTINUE these medications which have NOT CHANGED    Details   bictegravir-emtricitabine-tenofovir (BIKTARVY) -25 MG per tablet Take 1 tablet by mouth daily  Qty: 30 tablet, Refills: 11    Associated Diagnoses: HIV infection, unspecified symptom status (H)      blood glucose (NO BRAND SPECIFIED) lancets standard Use to test blood sugar four times daily or as directed.  Qty: 100 each, Refills: 11    Comments: Please provide lancets that are covered by insurance.  Associated Diagnoses: Type 2 diabetes mellitus without complication, without long-term current use of insulin (H)      blood glucose (NO BRAND SPECIFIED) test strip 1 strip by In Vitro route 4 times daily (before meals and nightly) Use to test blood sugars 4 times daily or as directed  Qty: 100 strip, Refills: 11    Comments: Please provide test strips that are covered by insurance.  Associated Diagnoses: Type 2 diabetes mellitus with complication, without long-term current use of insulin (H)      blood glucose monitoring (NO BRAND SPECIFIED) meter device kit Use to test blood sugar 4 times daily or as directed.  Qty:  1 kit, Refills: 0    Comments: Please provide meter that is covered by insurance.  Associated Diagnoses: Type 2 diabetes mellitus with complication, without long-term current use of insulin (H)      chlorhexidine (HIBICLENS) 4 % liquid Apply small amount to wash cloth and use to cleanse buttocks area during bathing 1-2 times per week  Qty: 118 mL, Refills: 0    Associated Diagnoses: Abscess of buttock, right; Type 2 diabetes mellitus with other skin complication, with long-term current use of insulin (H); Human immunodeficiency virus (HIV) disease (H)      !! insulin pen needle (B-D U/F) 31G X 8 MM miscellaneous Use 1 pen needles daily or as directed.  Qty: 100 each, Refills: 3    Associated Diagnoses: Type 2 diabetes mellitus without complication, without long-term current use of insulin (H)      !! insulin pen needle (BD PEN NEEDLE SCOTT 2ND GEN) 32G X 4 MM miscellaneous Use 3-4 daily.  Qty: 250 each, Refills: 3    Associated Diagnoses: Type 2 diabetes mellitus with hyperglycemia, without long-term current use of insulin (H)      naproxen (NAPROSYN) 500 MG tablet Take 1 tablet (500 mg) by mouth 2 times daily as needed for moderate pain Take with meals.  Qty: 30 tablet, Refills: 0      omeprazole (PRILOSEC) 20 MG CR capsule Take 1 capsule (20 mg) by mouth 2 times daily  Qty: 180 capsule, Refills: 3    Associated Diagnoses: Heart burn      ranitidine (ZANTAC) 150 MG tablet Take 1 tablet (150 mg) by mouth 2 times daily  Qty: 60 tablet, Refills: 11    Associated Diagnoses: Heart burn       !! - Potential duplicate medications found. Please discuss with provider.      STOP taking these medications       HUMALOG KWIKPEN 100 UNIT/ML soln Comments:   Reason for Stopping:         sulfamethoxazole-trimethoprim (BACTRIM DS) 800-160 MG tablet Comments:   Reason for Stopping:         sulfamethoxazole-trimethoprim (BACTRIM DS/SEPTRA DS) 800-160 MG tablet Comments:   Reason for Stopping:             Allergies   Allergies    Allergen Reactions     Metformin      Abdominal pain     Milk [Lac Bovis] Hives     Tylenol [Acetaminophen] Itching     Dulaglutide Rash

## 2019-11-18 LAB
AMPHET UR CFM-MCNC: 604 NG/ML
AMPHET UR QL CFM: NORMAL
D-METHAMPHET UR CFM-MCNC: >92 %
L-METHAMPHET UR CFM-MCNC: <8 %
METHAMPHET UR CFM-MCNC: 2841 NG/ML

## 2019-12-26 ENCOUNTER — HOSPITAL ENCOUNTER (EMERGENCY)
Facility: CLINIC | Age: 54
Discharge: HOME | End: 2019-12-27
Attending: EMERGENCY MEDICINE | Admitting: EMERGENCY MEDICINE
Payer: COMMERCIAL

## 2019-12-26 VITALS
HEART RATE: 93 BPM | WEIGHT: 158 LBS | TEMPERATURE: 99.1 F | OXYGEN SATURATION: 96 % | RESPIRATION RATE: 14 BRPM | DIASTOLIC BLOOD PRESSURE: 78 MMHG | HEIGHT: 64 IN | BODY MASS INDEX: 26.98 KG/M2 | SYSTOLIC BLOOD PRESSURE: 122 MMHG

## 2019-12-26 DIAGNOSIS — J18.9 PNEUMONIA DUE TO INFECTIOUS ORGANISM, UNSPECIFIED LATERALITY, UNSPECIFIED PART OF LUNG: ICD-10-CM

## 2019-12-26 PROCEDURE — 99283 EMERGENCY DEPT VISIT LOW MDM: CPT | Mod: Z6 | Performed by: EMERGENCY MEDICINE

## 2019-12-26 PROCEDURE — 99283 EMERGENCY DEPT VISIT LOW MDM: CPT | Performed by: EMERGENCY MEDICINE

## 2019-12-26 ASSESSMENT — MIFFLIN-ST. JEOR: SCORE: 1467.68

## 2019-12-26 NOTE — ED AVS SNAPSHOT
[facility], [place], Emergency Department  [address] [place] [id number]  Phone:  [phone]                                    Andrew Lockwood   MRN: [id number]    Department:  [facility], Emergency Department   Date of Visit:  12/26/2019           After Visit Summary Signature Page    I have received my discharge instructions, and my questions have been answered. I have discussed any challenges I see with this plan with the nurse or doctor.    ..........................................................................................................................................  Patient/Patient Representative Signature      ..........................................................................................................................................  Patient Representative Print Name and Relationship to Patient    ..................................................               ................................................  Date                                   Time    ..........................................................................................................................................  Reviewed by Signature/Title    ...................................................              ..............................................  Date                                               Time          22EPIC Rev 08/18

## 2019-12-27 ENCOUNTER — APPOINTMENT (OUTPATIENT)
Dept: GENERAL RADIOLOGY | Facility: CLINIC | Age: 54
End: 2019-12-27
Attending: EMERGENCY MEDICINE
Payer: COMMERCIAL

## 2019-12-27 PROCEDURE — 25000132 ZZH RX MED GY IP 250 OP 250 PS 637: Performed by: EMERGENCY MEDICINE

## 2019-12-27 PROCEDURE — 71046 X-RAY EXAM CHEST 2 VIEWS: CPT

## 2019-12-27 RX ADMIN — AMOXICILLIN AND CLAVULANATE POTASSIUM 1 TABLET: 875; 125 TABLET, FILM COATED ORAL at 01:20

## 2019-12-27 ASSESSMENT — ENCOUNTER SYMPTOMS
FEVER: 0
RHINORRHEA: 0
COUGH: 1

## 2019-12-27 NOTE — ED PROVIDER NOTES
[place] EMERGENCY DEPARTMENT ([facility])  December 26, 2019    History     Chief Complaint   Patient presents with     Cough     HPI  Andrew Lockwood is a 52 year old male with a history of HIV/AIDS, CNS toxoplasmosis, type II diabetes mellitus, gastric ulcers, and chronic abdominal pain who presents to the ED for evaluation of a productive cough. Patient reports he developed a cough 7 days ago. He denies fever or runny nose. He states he went to see his doctor last Wednesday, but was given Tylenol, which is not providing relief.     PAST MEDICAL HISTORY  Past Medical History:   Diagnosis Date     AIDS (H)      Allergic rhinitis due to other allergen     DNS     Chronic abdominal pain      CNS toxoplasmosis (H)      Diabetes type 2, controlled (H)      GERD (gastroesophageal reflux disease)      HIV (human immunodeficiency virus infection) (H)      Periungual wart      Sleep apnea     doesn't use CPAP     PAST SURGICAL HISTORY  Past Surgical History:   Procedure Laterality Date     C NONSPECIFIC PROCEDURE      right forearm fracture     COLONOSCOPY Left 1/22/2016    Procedure: COMBINED COLONOSCOPY, SINGLE OR MULTIPLE BIOPSY/POLYPECTOMY BY BIOPSY;  Surgeon: Clark Saini MD;  Location: [facility]     HC EXPLORE UNDESC TESTIS,INGUIN/SCROTAL       LAPAROSCOPIC APPENDECTOMY N/A 1/31/2018    Procedure: LAPAROSCOPIC APPENDECTOMY;  LAPAROSCOPIC APPENDECTOMY;  Surgeon: Dawn Holt MD;  Location[facility]     LAPAROSCOPY DIAGNOSTIC (GENERAL) N/A 7/26/2016    Procedure: LAPAROSCOPY DIAGNOSTIC (GENERAL);  Surgeon: Susannah Arriaga MD;  [facility]     LAPAROSCOPY DIAGNOSTIC (GENERAL) N/A 4/16/2018    Procedure: LAPAROSCOPY DIAGNOSTIC (GENERAL);  Diagnostic laparoscopy and lysis of adhesions;  Surgeon: Prince Dowling MD;  Location: [facility] OR     OPTICAL TRACKING SYSTEM CRANIOTOMY, EXCISE TUMOR, COMBINED Left 4/10/2015    Procedure: COMBINED OPTICAL TRACKING SYSTEM CRANIOTOMY, EXCISE  TUMOR;  Surgeon: Mirlande Colmenares MD;  Location: [facility] OR     REPAIR GAMEKEEPER'S THUMB Right 12/2/2016    Procedure: REPAIR LIGAMENT ULNAR COLLATERAL THUMB (GAMEKEEPER'S);  Surgeon: Evin Zamorano MD;  Location: [facility] OR     FAMILY HISTORY  Family History   Problem Relation Age of Onset     Diabetes Brother      Diabetes Father      Alzheimer Disease Father      Unknown/Adopted Mother      Diabetes Paternal Grandfather      Cancer No family hx of         no skin cancer     Skin Cancer No family hx of         no famiy hx of skin cancer     Glaucoma No family hx of      Macular Degeneration No family hx of      SOCIAL HISTORY  Social History     Tobacco Use     Smoking status: Current Some Day Smoker     Packs/day: 0.25     Types: Cigarettes     Last attempt to quit: 10/28/2016     Years since quitting: 3.1     Smokeless tobacco: Former User   Substance Use Topics     Alcohol use: No     Alcohol/week: 0.0 standard drinks     Comment: Last etoh in 2007     MEDICATIONS  Current Facility-Administered Medications   Medication     amoxicillin-clavulanate (AUGMENTIN) 875-125 MG per tablet 1 tablet     Current Outpatient Medications   Medication     amoxicillin-clavulanate (AUGMENTIN) 875-125 MG tablet     bictegravir-emtricitabine-tenofovir (BIKTARVY) -25 MG per tablet     blood glucose (NO BRAND SPECIFIED) lancets standard     blood glucose (NO BRAND SPECIFIED) test strip     blood glucose monitoring (NO BRAND SPECIFIED) meter device kit     chlorhexidine (HIBICLENS) 4 % liquid     insulin glargine (LANTUS PEN) 100 UNIT/ML pen     insulin pen needle (B-D U/F) 31G X 8 MM miscellaneous     insulin pen needle (BD PEN NEEDLE SCOTT 2ND GEN) 32G X 4 MM miscellaneous     naproxen (NAPROSYN) 500 MG tablet     NOVOLOG FLEXPEN 100 UNIT/ML soln     omeprazole (PRILOSEC) 20 MG CR capsule     ranitidine (ZANTAC) 150 MG tablet     ALLERGIES  Allergies   Allergen Reactions     Metformin      Abdominal pain     Milk [Lac Bovis]  "Hives     Tylenol [Acetaminophen] Itching     Dulaglutide Rash       I have reviewed the Medications, Allergies, Past Medical and Surgical History, and Social History in the Epic system.    Review of Systems   Constitutional: Negative for fever.   HENT: Negative for rhinorrhea.    Respiratory: Positive for cough (productive).    All other systems reviewed and are negative.      Physical Exam   BP: 122/78  Pulse: 93  Temp: 99.1  F (37.3  C)  Resp: 14  Height: 162.6 cm (5' 4\")  Weight: 71.7 kg (158 lb)  SpO2: 96 %      Physical Exam  Constitutional:       General: He is not in acute distress.     Appearance: He is well-developed. He is not ill-appearing, toxic-appearing or diaphoretic.      Comments: Comfortably resting, lying in bed, NAD, nondiaphoretic, lucid, fully conversant, no  respiratory distress, alert and oriented.     HENT:      Head: Normocephalic and atraumatic.      Nose: Nose normal.   Eyes:      General: No scleral icterus.  Neck:      Musculoskeletal: Normal range of motion and neck supple.   Cardiovascular:      Rate and Rhythm: Normal rate.   Pulmonary:      Effort: Pulmonary effort is normal.      Breath sounds: Normal breath sounds.      Comments: Nonproductive cough  Skin:     General: Skin is warm and dry.      Capillary Refill: Capillary refill takes less than 2 seconds.      Findings: No rash.   Neurological:      Mental Status: He is alert and oriented to person, place, and time.         ED Course        Procedures             Critical Care time:  none     Chest XR,  PA & LAT                     Labs Ordered and Resulted from Time of ED Arrival Up to the Time of Departure from the ED - No data to display       I have reviewed the patient's prior chart including recent labs, ER visits, and available inpatient discharge summaries if available.    Assessments & Plan (with Medical Decision Making)   This is a 52-year-old male well-known to the emergency room is coming into the emergency room with " "a cough.  On physical exam he is sleeping in his chair and is nontoxic-appearing.  He is in no respiratory distress.  He is otherwise vitally stable.  Chest x-ray shows a small infiltrate.  He will be started on Augmentin here in the emergency room and discharged with a prescription for Augmentin.  He can follow-up with his primary care doctor for further care management.    Pt was discharged home/self-care.  PT was provided written discharge instructions. Additionally verbal instructions were given and discussed with patient.  PT was asked to return to the ED immediately for any new or concerning symptoms.  Pt was in agreement, endorsed understanding, and questions were answered.     I have reviewed the nursing notes.    I have reviewed the findings, diagnosis, plan and need for follow up with the patient.    New Prescriptions    AMOXICILLIN-CLAVULANATE (AUGMENTIN) 875-125 MG TABLET    Take 1 tablet by mouth 2 times daily       Final diagnoses:   Pneumonia due to infectious organism, unspecified laterality, unspecified part of lung     I, Dalila Varma, am serving as a trained medical scribe to document services personally performed by Torres Saenz MD, based on the provider's statements to me.      I, Torres Saenz MD, was physically present and have reviewed and verified the accuracy of this note documented by Dalila Varma.     \"This dictation was performed with the assistance of voice recognition software and may contain inadvertant transcription  errors,  omissions and/or  inadvertent word substitution.\" --Torres Saenz MD     12/26/2019   [facility] EMERGENCY DEPARTMENT     Torres Saenz MD  12/27/19 0100    "

## 2019-12-29 ENCOUNTER — APPOINTMENT (OUTPATIENT)
Dept: GENERAL RADIOLOGY | Facility: CLINIC | Age: 54
End: 2019-12-29
Attending: EMERGENCY MEDICINE
Payer: COMMERCIAL

## 2019-12-29 ENCOUNTER — APPOINTMENT (OUTPATIENT)
Dept: CT IMAGING | Facility: CLINIC | Age: 54
End: 2019-12-29
Attending: EMERGENCY MEDICINE
Payer: COMMERCIAL

## 2019-12-29 ENCOUNTER — HOSPITAL ENCOUNTER (EMERGENCY)
Facility: CLINIC | Age: 54
Discharge: HOME | End: 2019-12-29
Attending: EMERGENCY MEDICINE | Admitting: EMERGENCY MEDICINE
Payer: COMMERCIAL

## 2019-12-29 VITALS
RESPIRATION RATE: 16 BRPM | BODY MASS INDEX: 27.12 KG/M2 | OXYGEN SATURATION: 96 % | TEMPERATURE: 97.9 F | HEART RATE: 101 BPM | HEIGHT: 64 IN | SYSTOLIC BLOOD PRESSURE: 118 MMHG | DIASTOLIC BLOOD PRESSURE: 77 MMHG

## 2019-12-29 DIAGNOSIS — J18.9 PNEUMONIA OF LEFT LOWER LOBE DUE TO INFECTIOUS ORGANISM: ICD-10-CM

## 2019-12-29 DIAGNOSIS — T07.XXXA MULTIPLE CONTUSIONS: ICD-10-CM

## 2019-12-29 LAB
GLUCOSE BLDC GLUCOMTR-MCNC: 382 MG/DL (ref 70–99)
GLUCOSE BLDC GLUCOMTR-MCNC: 421 MG/DL (ref 70–99)

## 2019-12-29 PROCEDURE — 72170 X-RAY EXAM OF PELVIS: CPT

## 2019-12-29 PROCEDURE — 73552 X-RAY EXAM OF FEMUR 2/>: CPT | Mod: LT

## 2019-12-29 PROCEDURE — 73560 X-RAY EXAM OF KNEE 1 OR 2: CPT | Mod: LT

## 2019-12-29 PROCEDURE — 72131 CT LUMBAR SPINE W/O DYE: CPT

## 2019-12-29 PROCEDURE — 70450 CT HEAD/BRAIN W/O DYE: CPT

## 2019-12-29 PROCEDURE — 73610 X-RAY EXAM OF ANKLE: CPT | Mod: RT

## 2019-12-29 PROCEDURE — 73080 X-RAY EXAM OF ELBOW: CPT | Mod: RT

## 2019-12-29 PROCEDURE — 00000146 ZZHCL STATISTIC GLUCOSE BY METER IP

## 2019-12-29 PROCEDURE — 99284 EMERGENCY DEPT VISIT MOD MDM: CPT | Mod: Z6 | Performed by: EMERGENCY MEDICINE

## 2019-12-29 PROCEDURE — 99285 EMERGENCY DEPT VISIT HI MDM: CPT | Mod: 25 | Performed by: EMERGENCY MEDICINE

## 2019-12-29 PROCEDURE — 73630 X-RAY EXAM OF FOOT: CPT | Mod: RT

## 2019-12-29 PROCEDURE — 72125 CT NECK SPINE W/O DYE: CPT

## 2019-12-29 RX ORDER — METHOCARBAMOL 500 MG/1
1000 TABLET, FILM COATED ORAL 3 TIMES DAILY
Qty: 30 TABLET | Refills: 0 | Status: ON HOLD | OUTPATIENT
Start: 2019-12-29 | End: 2020-01-16

## 2019-12-29 RX ORDER — IBUPROFEN 200 MG
600 TABLET ORAL 3 TIMES DAILY
Qty: 45 TABLET | Refills: 0 | Status: SHIPPED | OUTPATIENT
Start: 2019-12-29 | End: 2020-01-14

## 2019-12-29 NOTE — ED TRIAGE NOTES
Patient reports falling down stairs yesterday and has c/o of lower back pain and right elbow pain.

## 2019-12-29 NOTE — ED NOTES
Patient signed out to me at the end of my partners night shift, Dr. Perez's.  Patient's history was that he had fallen down some stairs and complained of multiple injuries.  Patient's x-rays were pending at the time of signout.      Results for orders placed or performed during the hospital encounter of 12/29/19 (from the past 24 hour(s))   Ankle XR, G/E 3 views, right    Narrative    Exam: 3 views of the right ankle dated 12/29/2019.    COMPARISON: None.    CLINICAL HISTORY: Fall.    FINDINGS: AP, oblique, and lateral views of the right ankle were  obtained. The bones are well aligned. The joint spaces are  well-maintained. Mild soft tissue prominence at the right ankle joint.      Impression    IMPRESSION: No displaced fractures in the right ankle.    SUSAN VERAS MD   Elbow XR, G/E 3 views, right    Narrative    Exam: 2 views of the right elbow dated 12/28/2019.    COMPARISON: 10/24/2019.    CLINICAL HISTORY: Fall.    FINDINGS: 2 views of the right elbow were obtained. The bones are well  aligned. The joint spaces are well-maintained. No joint effusion. No  displaced fractures.      Impression    IMPRESSION: No displaced fractures in the right elbow.    SUSAN VERAS MD   Foot  XR, G/E 3 views, right    Narrative    Exam: 3 views of the right foot dated 12/29/2019.    COMPARISON: Radiographs dated 9/19/2018.    CLINICAL HISTORY: Fall.    FINDINGS: AP, oblique, and lateral views of the right foot were  obtained. The bones are well aligned. The Lisfranc joint is congruent.  No displaced fractures. Tiny spur along the distal first metatarsal.      Impression    IMPRESSION: No acute bone abnormality in the right foot.    SUSAN VERAS MD   XR Femur Left 2 Views    Narrative    Exam: 4 views of the left femur dated 12/29/2019.    COMPARISON: None.    CLINICAL HISTORY: Fall.    FINDINGS: 4 views of the left femur were obtained. The bones are well  aligned. No displaced fractures in the left femur. Left hip  joint  space is well-maintained. No femoral head collapse.      Impression    IMPRESSION: No displaced fractures in the left hip.    SUSAN VERAS MD   XR Knee Left 1/2 Views    Narrative    Exam: 2 views of the left knee dated 12/29/2019.    COMPARISON: Radiographs dated 11/22/2016.    CLINICAL HISTORY: Fall.    FINDINGS: AP and lateral views of the left knee were obtained. No  large left knee joint effusion. The bones are well aligned. No  displaced fractures.      Impression    IMPRESSION: No displaced fractures in the left knee.    SUSAN VERAS MD   XR Pelvis 1/2 Views    Narrative    Exam: Single frontal view of the pelvis dated 12/29/2019.    COMPARISON: CT dated 7/13/2019.    CLINICAL HISTORY: Fall.    FINDINGS: Single frontal view of the pelvis was obtained. Hip joint  spaces are well-maintained. No evidence of a displaced fracture or  dislocation. Penile prosthesis in place.      Impression    IMPRESSION: No displaced fractures in the pelvis on a single frontal  view.    SUSAN VERAS MD   Head CT w/o contrast    Narrative    CT head without contrast 12/29/2019 8:16 AM    Provided History: Trauma    Comparison: CT head 11/11/2019    Technique: Using multidetector thin collimation helical acquisition  technique, axial, coronal and sagittal CT images from the skull base  to the vertex were obtained without intravenous contrast.     Findings:    No intracranial hemorrhage, mass effect, or midline shift.  Postsurgical changes of left occipital cranioplasty, with unchanged  encephalomalacia of the left cerebellar hemisphere.      The ventricles are proportionate to the cerebral sulci. The gray to  white matter differentiation of the cerebral hemispheres is preserved.  The basal cisterns are patent. Parenchymal calcification in the right  parietal lobe.    The visualized paranasal sinuses, show scattered mucosal thickening.  Trace mastoid effusion.        Impression    Impression: No acute intracranial  pathology.    I have personally reviewed the examination and initial interpretation  and I agree with the findings.    MCKAY IRVING MD   Cervical spine CT w/o contrast    Narrative    CT CERVICAL SPINE W/O CONTRAST 12/29/2019 8:32 AM    Provided History: Trauma    Comparison: CT cervical spine 8/9/2019    Technique: Using multidetector thin collimation helical acquisition  technique, axial, coronal and sagittal CT images through the cervical  spine were obtained without intravenous contrast.     Findings:  The cervical vertebrae are normally aligned. Straightening of cervical  spine.     No acute fracture or subluxation. No prevertebral edema.     There is no disc height narrowing at any level. No significant spinal  canal or neural foramina stenosis.    No abnormality of the paraspinous soft tissues.    Left occipital cranioplasty changes. Minimal left mastoid effusion.      Impression    Impression:     No acute cervical spine fracture or traumatic subluxation.    I have personally reviewed the examination and initial interpretation  and I agree with the findings.    MCKAY IRVING MD   Lumbar spine CT w/o contrast    Narrative    CT LUMBAR SPINE W/O CONTRAST 12/29/2019 8:33 AM    History: lumbar spine pain after fall, eval for fracture, etc.    Comparison: None.    Technique: Using multidetector thin collimation helical acquisition  technique, axial, coronal and sagittal CT images through the lumbar  spine were obtained without intravenous contrast.     Findings:   There are 5 lumbar type vertebrae. Regarding alignment, the lumbar  spine alignment appears preserved.     There is no significant disc space narrowing at any level. No definite  lesions are noted within the vertebra.     No spinal canal or neural foraminal stenosis.    The visualized adjacent paraspinous tissues are grossly within normal  limits.      Impression    Impression:    No lumbar spine fracture or traumatic subluxation.    I have  personally reviewed the examination and initial interpretation  and I agree with the findings.    MCKAY IRVING MD     *Note: Due to a large number of results and/or encounters for the requested time period, some results have not been displayed. A complete set of results can be found in Results Review.       Results of the x-rays were discussed with the patient and the patient was up and ambulatory in the ER without difficulty.    Patient's chart was reviewed and was found that he had been diagnosed with a left lower lobe pneumonia within the last 3 days and states he has been taking his Augmentin as directed.  Patient denies any shortness of breath fevers or vomiting.    Patient's blood sugar will be checked prior to discharge.      Final diagnoses:   Multiple contusions   Pneumonia of left lower lobe due to infectious organism (H) - currently being treated       Please make an appointment to follow up with Your Primary Care Provider in 10-14 days for recheck.    Routine discharge instructions were given for this diagnosis    Pieter Bruno MD, Pieter Levine MD  12/29/19 1374

## 2019-12-29 NOTE — ED PROVIDER NOTES
[place] EMERGENCY DEPARTMENT ([facility])  12/29/19 [facility]   History     Chief Complaint   Patient presents with     Fall     HPI  Andrew Lockwood is a 52 year old male with history of HIV and recent diagnosis of pneumonia currently on Augmentin, and diabetes who presents to the Emergency Department for evaluation after a fall.  He states that he tripped and fell down some stairs yesterday.  He states he struck his head and may have had a brief loss of consciousness.  He denies being on any anticoagulation.  He complains of pain in multiple areas.  He reports that he has neck pain, lower back pain, right foot and ankle pain and left upper leg and knee pain.  He also reports right elbow pain.  He states he has some superficial abrasions.  He denies any abdominal pain, nausea, vomiting, or diarrhea.  He was recently diagnosed with pneumonia and states he has been taking his Augmentin as prescribed.  He states that his symptoms are much improved and he denies any current cough or shortness of breath.  He denies any chest pain.  States his tetanus is up-to-date.  Denies any alcohol use, illicit drug use.  States he has been compliant with his insulin and his sugars have been running normally.  Reports this was a mechanical fall and did not feel lightheaded or have chest pain or shortness of breath prior to the incident.  Nuys any history of seizures.  He denies any bowel or bladder incontinence.  He denies any numbness, tingling, or weakness.  He has been able to ambulate since the incident.    I have reviewed the Medications, Allergies, Past Medical and Surgical History, and Social History in the [facility] system.    Past Medical History:   Diagnosis Date     AIDS (H)      Allergic rhinitis due to other allergen     DNS     Chronic abdominal pain      CNS toxoplasmosis (H)      Diabetes type 2, controlled (H)      GERD (gastroesophageal reflux disease)      HIV (human immunodeficiency virus infection) (H)       Periungual wart      Sleep apnea     doesn't use CPAP     Past Surgical History:   Procedure Laterality Date     C NONSPECIFIC PROCEDURE      right forearm fracture     COLONOSCOPY Left 1/22/2016    Procedure: COMBINED COLONOSCOPY, SINGLE OR MULTIPLE BIOPSY/POLYPECTOMY BY BIOPSY;  Surgeon: Clark Saini MD;  Location: [facility] GI     HC EXPLORE UNDESC TESTIS,INGUIN/SCROTAL       LAPAROSCOPIC APPENDECTOMY N/A 1/31/2018    Procedure: LAPAROSCOPIC APPENDECTOMY;  LAPAROSCOPIC APPENDECTOMY;  Surgeon: Dawn Holt MD;  Location: [facility] OR     LAPAROSCOPY DIAGNOSTIC (GENERAL) N/A 7/26/2016    Procedure: LAPAROSCOPY DIAGNOSTIC (GENERAL);  Surgeon: Susannah Arriaga MD;  Location: [facility] OR     LAPAROSCOPY DIAGNOSTIC (GENERAL) N/A 4/16/2018    Procedure: LAPAROSCOPY DIAGNOSTIC (GENERAL);  Diagnostic laparoscopy and lysis of adhesions;  Surgeon: Prince Dowling MD;  Location: [facility] OR     OPTICAL TRACKING SYSTEM CRANIOTOMY, EXCISE TUMOR, COMBINED Left 4/10/2015    Procedure: COMBINED OPTICAL TRACKING SYSTEM CRANIOTOMY, EXCISE TUMOR;  Surgeon: Mirlande Colmneares MD;  Location: [facility] OR     REPAIR GAMEKEEPER'S THUMB Right 12/2/2016    Procedure: REPAIR LIGAMENT ULNAR COLLATERAL THUMB (GAMEKEEPER'S);  Surgeon: Evin Zamorano MD;  Location:  OR     No current facility-administered medications for this encounter.      Current Outpatient Medications   Medication     amoxicillin-clavulanate (AUGMENTIN) 875-125 MG tablet     azithromycin (ZITHROMAX) 250 MG tablet     bictegravir-emtricitabine-tenofovir (BIKTARVY) -25 MG per tablet     blood glucose (NO BRAND SPECIFIED) lancets standard     blood glucose (NO BRAND SPECIFIED) test strip     blood glucose monitoring (NO BRAND SPECIFIED) meter device kit     chlorhexidine (HIBICLENS) 4 % liquid     famotidine (PEPCID) 20 MG tablet     insulin glargine (LANTUS PEN) 100 UNIT/ML pen     insulin pen needle (B-D U/F) 31G X 8 MM miscellaneous     insulin pen needle  (BD PEN NEEDLE SCOTT 2ND GEN) 32G X 4 MM miscellaneous     NOVOLOG FLEXPEN 100 UNIT/ML soln     Allergies   Allergen Reactions     Metformin      Abdominal pain     Milk [Lac Bovis] Hives     Tylenol [Acetaminophen] Itching     Dulaglutide Rash     Social History     Socioeconomic History     Marital status: Single     Spouse name: Not on file     Number of children: Not on file     Years of education: Not on file     Highest education level: Not on file   Occupational History     Occupation:      Comment: 5 years; temp agency     Occupation: Unemployed   Social Needs     Financial resource strain: Not on file     Food insecurity:     Worry: Not on file     Inability: Not on file     Transportation needs:     Medical: Not on file     Non-medical: Not on file   Tobacco Use     Smoking status: Current Some Day Smoker     Packs/day: 0.25     Types: Cigarettes     Last attempt to quit: 10/28/2016     Years since quitting: 3.2     Smokeless tobacco: Former User   Substance and Sexual Activity     Alcohol use: No     Alcohol/week: 0.0 standard drinks     Comment: Last etoh in 2007     Drug use: Not Currently     Types: Marijuana     Sexual activity: Not Currently     Partners: Female, Male     Comment: Last sexual activity 2008   Lifestyle     Physical activity:     Days per week: Not on file     Minutes per session: Not on file     Stress: Not on file   Relationships     Social connections:     Talks on phone: Not on file     Gets together: Not on file     Attends [employer] service: Not on file     Active member of club or organization: Not on file     Attends meetings of clubs or organizations: Not on file     Relationship status: Not on file     Intimate partner violence:     Fear of current or ex partner: Not on file     Emotionally abused: Not on file     Physically abused: Not on file     Forced sexual activity: Not on file   Other Topics Concern     Parent/sibling w/ CABG, MI or angioplasty before 65F  "55M? Not Asked   Social History Narrative    Born in [facility].  Came to the [place] in [place].  Last traveled to visit family in 2008.        1/7/2019 - Homeless. Working with a  at [employer]  trying to get housing. Sexually active with two partners recently using condoms 100% of the time. Smokes occasionally, not for the last 4 weeks.        1/7/2020 at [facility] since 1/1/2020. 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  Care established with ID and endocrine         Review of Systems  Pertinent positives and negatives are documented in the HPI. All other systems reviewed and are negative.    Physical Exam   BP: 115/81  Pulse: 101  Temp: 97.9  F (36.6  C)  Resp: 16  Height: 162.6 cm (5' 4\")  SpO2: 98 %      Physical Exam  GEN:  Alert, well developed, no acute distress  HEENT:  PERRL, EOMI, Mucous membranes are moist.  No scalp hematoma.  No hemotympanum bilaterally.  No septal hematoma.  No intraoral trauma.  No facial bone tenderness.  No bradley sign or raccoon eyes.  No otorrhea or rhinorrhea.  Neck: Patient reports paraspinal muscle tenderness on the left side of his neck.  Full range of motion of the neck without pain or rigidity.  No specific midline tenderness.  Cardio:  RRR, no murmur, radial DP and PT pulses equal bilaterally  PULM:  Lungs clear, good air movement, no wheezes, rales   Abd:  Soft, normal bowel sounds, no focal tenderness, no flank or abdominal ecchymosis noted.  Back exam:  No CVA tenderness.  Patient reports some midline tenderness to the lumbar spine.  No midline tenderness of the thoracic spine.  Full range of motion of the back.  No evidence of ecchymosis.  Musculoskeletal:  normal range of motion, no lower extremity swelling or calf tenderness, reports some tenderness to palpation of the right elbow area.  Also reports tenderness to the palpation of the left knee and femur.  Mild tenderness to palpation of the right foot and ankle.  He does however have full range of motion of all 4 " extremities and of each of these joints without difficulty.  Some scattered superficial abrasions noted.  No lacerations.  Pelvis is stable and nontender to compression.  No chest wall tenderness to palpation.  No tenderness of the clavicles bilaterally.  No crepitus of the chest or bruising of the chest.  Neuro:  Alert and oriented X3, Follows commands, moving all extremities spontaneously, 5-5 strength in all 4 extremities bilaterally.  Sensation intact light touch in all 4 extremities bilaterally.  Finger-to-nose intact bilaterally.  Ambulating here without difficulty.  Skin:  Warm, dry    ED Course        Procedures             Critical Care time:  none             Labs Ordered and Resulted from Time of ED Arrival Up to the Time of Departure from the ED - No data to display         Assessments & Plan (with Medical Decision Making)   Patient presents after a mechanical fall down stairs striking his head and reporting a brief loss of consciousness.  He complains of pain in various places as documented above.  With the mechanism and his loss of consciousness we will obtain a CT of the head without contrast.  We will also obtain CTs of the cervical spine and lumbar spine.  We also obtained x-rays of the right foot and ankle as well as the left femur and left knee and pelvis and right elbow.  I have low suspicion for any true fracture as he does have normal range of motion of all joints in all extremities.  He is neurovascularly intact.  Ambulating here without difficulty.  No focal neurologic deficits.  He also did not have any chest wall tenderness or ecchymosis or crepitus and thus we did not obtain chest imaging at this time.  This was clinically cleared.  He had equal breath sounds and no evidence of respiratory distress and no complaint of chest pain or shortness of breath.  He was recently diagnosed with pneumonia and states he is been compliant with Augmentin and feeling much better.  His abdominal exam was  benign without evidence of trauma and no tenderness.  He reports this to be a mechanical fall and thus I have no concern for syncope prior to the fall or seizure as a cause at this point.  Patient declined any pain medication currently.  At this time, he will be signed out to my colleague pending his imaging studies.  He was in agreement with this plan and I do suspect likely discharge.  Please see my colleagues note for further management and final disposition as well as final results.  Patient was stable upon my last interaction.    I have reviewed the nursing notes.    I have reviewed the findings, diagnosis, plan and need for follow up with the patient.        Final diagnoses:   Multiple contusions   Pneumonia of left lower lobe due to infectious organism (H) - currently being treated       12/29/2019   [facility], [place], EMERGENCY DEPARTMENT     Mirian Perez MD  01/10/20 2231

## 2019-12-29 NOTE — ED AVS SNAPSHOT
[facility], [place], Emergency Department  [address] [place] [id number]  Phone:  [phone]                                    Andrew Lockwood   MRN: [id number]    Department:  [facility], Emergency Department   Date of Visit:  12/29/2019           After Visit Summary Signature Page    I have received my discharge instructions, and my questions have been answered. I have discussed any challenges I see with this plan with the nurse or doctor.    ..........................................................................................................................................  Patient/Patient Representative Signature      ..........................................................................................................................................  Patient Representative Print Name and Relationship to Patient    ..................................................               ................................................  Date                                   Time    ..........................................................................................................................................  Reviewed by Signature/Title    ...................................................              ..............................................  Date                                               Time          22EPIC Rev 08/18

## 2020-01-01 ENCOUNTER — HOSPITAL ENCOUNTER (EMERGENCY)
Facility: CLINIC | Age: 55
Discharge: HOME | End: 2020-01-01
Attending: EMERGENCY MEDICINE | Admitting: EMERGENCY MEDICINE
Payer: COMMERCIAL

## 2020-01-01 VITALS
BODY MASS INDEX: 27.03 KG/M2 | DIASTOLIC BLOOD PRESSURE: 99 MMHG | TEMPERATURE: 97.3 F | RESPIRATION RATE: 15 BRPM | OXYGEN SATURATION: 96 % | WEIGHT: 158.31 LBS | HEART RATE: 93 BPM | HEIGHT: 64 IN | SYSTOLIC BLOOD PRESSURE: 159 MMHG

## 2020-01-01 DIAGNOSIS — R73.9 HYPERGLYCEMIA: ICD-10-CM

## 2020-01-01 LAB
ANION GAP SERPL CALCULATED.3IONS-SCNC: 6 MMOL/L (ref 3–14)
BASOPHILS # BLD AUTO: 0.1 10E9/L (ref 0–0.2)
BASOPHILS NFR BLD AUTO: 1.2 %
BUN SERPL-MCNC: 14 MG/DL (ref 7–30)
CALCIUM SERPL-MCNC: 9 MG/DL (ref 8.5–10.1)
CHLORIDE SERPL-SCNC: 97 MMOL/L (ref 94–109)
CO2 BLDCOV-SCNC: 27 MMOL/L (ref 21–28)
CO2 SERPL-SCNC: 26 MMOL/L (ref 20–32)
CREAT SERPL-MCNC: 0.67 MG/DL (ref 0.66–1.25)
DIFFERENTIAL METHOD BLD: ABNORMAL
EOSINOPHIL # BLD AUTO: 0.2 10E9/L (ref 0–0.7)
EOSINOPHIL NFR BLD AUTO: 3.1 %
ERYTHROCYTE [DISTWIDTH] IN BLOOD BY AUTOMATED COUNT: 12.3 % (ref 10–15)
GFR SERPL CREATININE-BSD FRML MDRD: >90 ML/MIN/{1.73_M2}
GLUCOSE BLDC GLUCOMTR-MCNC: 224 MG/DL (ref 70–99)
GLUCOSE BLDC GLUCOMTR-MCNC: 290 MG/DL (ref 70–99)
GLUCOSE BLDC GLUCOMTR-MCNC: 453 MG/DL (ref 70–99)
GLUCOSE SERPL-MCNC: 477 MG/DL (ref 70–99)
HCT VFR BLD AUTO: 40.5 % (ref 40–53)
HGB BLD-MCNC: 13.7 G/DL (ref 13.3–17.7)
IMM GRANULOCYTES # BLD: 0.2 10E9/L (ref 0–0.4)
IMM GRANULOCYTES NFR BLD: 2.2 %
INTERPRETATION ECG - MUSE: NORMAL
KETONES BLD-SCNC: 0 MMOL/L (ref 0–0.6)
LACTATE BLD-SCNC: 2 MMOL/L (ref 0.7–2.1)
LYMPHOCYTES # BLD AUTO: 3 10E9/L (ref 0.8–5.3)
LYMPHOCYTES NFR BLD AUTO: 38.7 %
MCH RBC QN AUTO: 30.1 PG (ref 26.5–33)
MCHC RBC AUTO-ENTMCNC: 33.8 G/DL (ref 31.5–36.5)
MCV RBC AUTO: 89 FL (ref 78–100)
MONOCYTES # BLD AUTO: 0.5 10E9/L (ref 0–1.3)
MONOCYTES NFR BLD AUTO: 5.9 %
NEUTROPHILS # BLD AUTO: 3.8 10E9/L (ref 1.6–8.3)
NEUTROPHILS NFR BLD AUTO: 48.9 %
NRBC # BLD AUTO: 0 10*3/UL
NRBC BLD AUTO-RTO: 0 /100
PCO2 BLDV: 40 MM HG (ref 40–50)
PH BLDV: 7.43 PH (ref 7.32–7.43)
PLATELET # BLD AUTO: 499 10E9/L (ref 150–450)
PO2 BLDV: 38 MM HG (ref 25–47)
POTASSIUM SERPL-SCNC: 4 MMOL/L (ref 3.4–5.3)
RBC # BLD AUTO: 4.55 10E12/L (ref 4.4–5.9)
SAO2 % BLDV FROM PO2: 74 %
SODIUM SERPL-SCNC: 129 MMOL/L (ref 133–144)
WBC # BLD AUTO: 7.8 10E9/L (ref 4–11)

## 2020-01-01 PROCEDURE — 96372 THER/PROPH/DIAG INJ SC/IM: CPT | Mod: 59 | Performed by: EMERGENCY MEDICINE

## 2020-01-01 PROCEDURE — 25000132 ZZH RX MED GY IP 250 OP 250 PS 637: Performed by: EMERGENCY MEDICINE

## 2020-01-01 PROCEDURE — 83605 ASSAY OF LACTIC ACID: CPT

## 2020-01-01 PROCEDURE — 99284 EMERGENCY DEPT VISIT MOD MDM: CPT | Mod: 25 | Performed by: EMERGENCY MEDICINE

## 2020-01-01 PROCEDURE — 00000146 ZZHCL STATISTIC GLUCOSE BY METER IP

## 2020-01-01 PROCEDURE — 82803 BLOOD GASES ANY COMBINATION: CPT

## 2020-01-01 PROCEDURE — 96360 HYDRATION IV INFUSION INIT: CPT | Performed by: EMERGENCY MEDICINE

## 2020-01-01 PROCEDURE — 25000125 ZZHC RX 250: Performed by: EMERGENCY MEDICINE

## 2020-01-01 PROCEDURE — 25800030 ZZH RX IP 258 OP 636: Performed by: EMERGENCY MEDICINE

## 2020-01-01 PROCEDURE — 85025 COMPLETE CBC W/AUTO DIFF WBC: CPT | Performed by: EMERGENCY MEDICINE

## 2020-01-01 PROCEDURE — 99284 EMERGENCY DEPT VISIT MOD MDM: CPT | Mod: Z6 | Performed by: EMERGENCY MEDICINE

## 2020-01-01 PROCEDURE — 82010 KETONE BODYS QUAN: CPT | Performed by: EMERGENCY MEDICINE

## 2020-01-01 PROCEDURE — 25000131 ZZH RX MED GY IP 250 OP 636 PS 637: Performed by: EMERGENCY MEDICINE

## 2020-01-01 PROCEDURE — 80048 BASIC METABOLIC PNL TOTAL CA: CPT | Performed by: EMERGENCY MEDICINE

## 2020-01-01 RX ADMIN — INSULIN ASPART 6 UNITS: 100 INJECTION, SOLUTION INTRAVENOUS; SUBCUTANEOUS at 13:26

## 2020-01-01 RX ADMIN — LIDOCAINE HYDROCHLORIDE 30 ML: 20 SOLUTION ORAL; TOPICAL at 13:53

## 2020-01-01 RX ADMIN — SODIUM CHLORIDE 1000 ML: 9 INJECTION, SOLUTION INTRAVENOUS at 12:36

## 2020-01-01 ASSESSMENT — ENCOUNTER SYMPTOMS
VOMITING: 0
FREQUENCY: 1
FEVER: 0
ABDOMINAL PAIN: 0
POLYDIPSIA: 1

## 2020-01-01 ASSESSMENT — MIFFLIN-ST. JEOR: SCORE: 1469.1

## 2020-01-01 NOTE — ED PROVIDER NOTES
[place] [facility]ERGENC[facility] DEPARTMENT ([facility])  January 1, 2020    History     Chief Complaint   Patient presents with     Hyperglycemia     HPI  Andrew Lockwood is a 52 year old male with a history of HIV/AIDS and type II diabetes mellitus who presents to the ED for evaluation of hyperglycemia. Patient reports his blood sugar was 450 this morning. He states he has been drinking a lot of water and is still very thirsty. He reports he has urinary frequency. Patient states he has been compliant with his insulin; he takes 40 units of Lantus twice a day, and he takes 7 units of Novolog with meals. He notes he will take 20 units of Novolog when his blood sugar is high, but does not specify if he has a sliding scale. He reports he took 40 units of Lantus this morning, and notes he has not had anything to eat today. He denies fever, vomiting, or abdominal pain. He denies previous history of DKA. Patient notes he was diagnosed with pneumonia on 12/26/19 is being treated with Augmentin, and is feeling much better.     PAST MEDICAL HISTORY  Past Medical History:   Diagnosis Date     AIDS (H)      Allergic rhinitis due to other allergen     DNS     Chronic abdominal pain      CNS toxoplasmosis (H)      Diabetes type 2, controlled (H)      GERD (gastroesophageal reflux disease)      HIV (human immunodeficiency virus infection) (H)      Periungual wart      Sleep apnea     doesn't use CPAP     PAST SURGICAL HISTORY  Past Surgical History:   Procedure Laterality Date     C NONSPECIFIC PROCEDURE      right forearm fracture     COLONOSCOPY Left 1/22/2016    Procedure: COMBINED COLONOSCOPY, SINGLE OR MULTIPLE BIOPSY/POLYPECTOMY BY BIOPSY;  Surgeon: Clark Saini MD;  Location: [facility]     HC EXPLORE UNDESC TESTIS,INGUIN/SCROTAL       LAPAROSCOPIC APPENDECTOMY N/A 1/31/2018    Procedure: LAPAROSCOPIC APPENDECTOMY;  LAPAROSCOPIC APPENDECTOMY;  Surgeon: Dawn Holt MD;  Location: [facility] OR     LAPAROSCOPY  DIAGNOSTIC (GENERAL) N/A 7/26/2016    Procedure: LAPAROSCOPY DIAGNOSTIC (GENERAL);  Surgeon: Susannah Arriaga MD;  Location: [facility] OR     LAPAROSCOPY DIAGNOSTIC (GENERAL) N/A 4/16/2018    Procedure: LAPAROSCOPY DIAGNOSTIC (GENERAL);  Diagnostic laparoscopy and lysis of adhesions;  Surgeon: Prince Dowling MD;  Location: [facility] OR     OPTICAL TRACKING SYSTEM CRANIOTOMY, EXCISE TUMOR, COMBINED Left 4/10/2015    Procedure: COMBINED OPTICAL TRACKING SYSTEM CRANIOTOMY, EXCISE TUMOR;  Surgeon: Mirlande Colmenares MD;  Location: [facility] OR     REPAIR GAMEKEEPER'S THUMB Right 12/2/2016    Procedure: REPAIR LIGAMENT ULNAR COLLATERAL THUMB (GAMEKEEPER'S);  Surgeon: Evin Zamorano MD;  Location: [facility] OR     FAMILY HISTORY  Family History   Problem Relation Age of Onset     Diabetes Brother      Diabetes Father      Alzheimer Disease Father      Unknown/Adopted Mother      Diabetes Paternal Grandfather      Cancer No family hx of         no skin cancer     Skin Cancer No family hx of         no famiy hx of skin cancer     Glaucoma No family hx of      Macular Degeneration No family hx of      SOCIAL HISTORY  Social History     Tobacco Use     Smoking status: Current Some Day Smoker     Packs/day: 0.25     Types: Cigarettes     Last attempt to quit: 10/28/2016     Years since quitting: 3.1     Smokeless tobacco: Former User   Substance Use Topics     Alcohol use: No     Alcohol/week: 0.0 standard drinks     Comment: Last etoh in 2007     MEDICATIONS  Current Facility-Administered Medications   Medication     0.9% sodium chloride BOLUS     Current Outpatient Medications   Medication     amoxicillin-clavulanate (AUGMENTIN) 875-125 MG tablet     bictegravir-emtricitabine-tenofovir (BIKTARVY) -25 MG per tablet     ibuprofen (ADVIL/MOTRIN) 200 MG tablet     insulin glargine (LANTUS PEN) 100 UNIT/ML pen     blood glucose (NO BRAND SPECIFIED) lancets standard     blood glucose (NO BRAND SPECIFIED) test strip     blood glucose  "monitoring (NO BRAND SPECIFIED) meter device kit     chlorhexidine (HIBICLENS) 4 % liquid     insulin pen needle (B-D U/F) 31G X 8 MM miscellaneous     insulin pen needle (BD PEN NEEDLE SCOTT 2ND GEN) 32G X 4 MM miscellaneous     methocarbamol (ROBAXIN) 500 MG tablet     NOVOLOG FLEXPEN 100 UNIT/ML soln     ALLERGIES  Allergies   Allergen Reactions     Metformin      Abdominal pain     Milk [Lac Bovis] Hives     Tylenol [Acetaminophen] Itching     Dulaglutide Rash       I have reviewed the Medications, Allergies, Past Medical and Surgical History, and Social History in the Epic system.    Review of Systems   Constitutional: Negative for fever.   Gastrointestinal: Negative for abdominal pain and vomiting.   Endocrine: Positive for polydipsia.   Genitourinary: Positive for frequency.   All other systems reviewed and are negative.      Physical Exam   BP: 118/77  Pulse: 122  Temp: 97.3  F (36.3  C)  Resp: 24  Height: 162.6 cm (5' 4\")  Weight: 71.8 kg (158 lb 5 oz)  SpO2: 99 %      Physical Exam  Vitals signs and nursing note reviewed.   Constitutional:       General: He is not in acute distress.     Appearance: He is not diaphoretic.   HENT:      Head: Normocephalic and atraumatic.   Neck:      Musculoskeletal: Normal range of motion and neck supple.   Cardiovascular:      Rate and Rhythm: Normal rate.   Pulmonary:      Effort: Pulmonary effort is normal.   Abdominal:      Palpations: Abdomen is soft. There is no mass.      Tenderness: There is no rebound.      Hernia: No hernia is present.   Musculoskeletal: Normal range of motion.   Skin:     General: Skin is warm and dry.   Neurological:      Mental Status: He is alert and oriented to person, place, and time.      Sensory: No sensory deficit.   Psychiatric:         Mood and Affect: Mood normal.         Behavior: Behavior normal.         Thought Content: Thought content normal.         ED Course        Procedures             Critical Care time:  none             Labs " Ordered and Resulted from Time of ED Arrival Up to the Time of Departure from the ED - No data to display         Assessments & Plan (with Medical Decision Making)   52-year-old male with history of AIDS and insulin-dependent diabetes who presents with hyperglycemia.  Lab evaluation is unremarkable with no evidence of DKA.  He was hyperglycemic with a blood sugar of 477.  This improved after a normal saline bolus and 6 units of NovoLog.  Andrew was discharged with primary care follow up and should monitor his blood sugars closely.         I have reviewed the nursing notes.    I have reviewed the findings, diagnosis, plan and need for follow up with the patient.    New Prescriptions    No medications on file       Final diagnoses:   None     Dalila KELLY, am serving as a trained medical scribe to document services personally performed by Angelo Estes MD, based on the provider's statements to me.      Angelo KELLY MD, was physically present and have reviewed and verified the accuracy of this note documented by Dalila Varma.     1/1/2020   [facility], [place], EMERGENCY DEPARTMENT     Angelo Estes MD  01/01/20 2571

## 2020-01-01 NOTE — DISCHARGE INSTRUCTIONS
Please continue to take your insulin as prescribed and follow up with your primary care provider if your blood glucose remains elevated.  Return if any further concerns.

## 2020-01-01 NOTE — ED TRIAGE NOTES
52 year old male presents with concerns over elevated blood sugars.  Patient has had readings > 300 and don't seem to decrease with insulin injections.  Patient notes tachypnea, palpitations, and urinary frequency.

## 2020-01-01 NOTE — ED AVS SNAPSHOT
[facility], [place], Emergency Department  [address] [place] [id number]  Phone:  [phone]                                    Andrew Lockwood   MRN: [id number]    Department:  [facility], Emergency Department   Date of Visit:  1/1/2020           After Visit Summary Signature Page    I have received my discharge instructions, and my questions have been answered. I have discussed any challenges I see with this plan with the nurse or doctor.    ..........................................................................................................................................  Patient/Patient Representative Signature      ..........................................................................................................................................  Patient Representative Print Name and Relationship to Patient    ..................................................               ................................................  Date                                   Time    ..........................................................................................................................................  Reviewed by Signature/Title    ...................................................              ..............................................  Date                                               Time          22EPIC Rev 08/18

## 2020-01-07 ENCOUNTER — OFFICE VISIT (OUTPATIENT)
Dept: FAMILY MEDICINE | Facility: CLINIC | Age: 55
End: 2020-01-07
Payer: COMMERCIAL

## 2020-01-07 VITALS
HEART RATE: 110 BPM | RESPIRATION RATE: 17 BRPM | TEMPERATURE: 97.9 F | WEIGHT: 161 LBS | BODY MASS INDEX: 28.53 KG/M2 | DIASTOLIC BLOOD PRESSURE: 69 MMHG | SYSTOLIC BLOOD PRESSURE: 104 MMHG | HEIGHT: 63 IN | OXYGEN SATURATION: 100 %

## 2020-01-07 DIAGNOSIS — Z79.4 TYPE 2 DIABETES MELLITUS WITH HYPERGLYCEMIA, WITH LONG-TERM CURRENT USE OF INSULIN (H): ICD-10-CM

## 2020-01-07 DIAGNOSIS — J18.9 PNEUMONIA OF LEFT LOWER LOBE DUE TO INFECTIOUS ORGANISM: ICD-10-CM

## 2020-01-07 DIAGNOSIS — J18.9 PNEUMONIA OF LEFT LOWER LOBE DUE TO INFECTIOUS ORGANISM: Primary | ICD-10-CM

## 2020-01-07 DIAGNOSIS — K21.9 GASTROESOPHAGEAL REFLUX DISEASE WITHOUT ESOPHAGITIS: ICD-10-CM

## 2020-01-07 DIAGNOSIS — E11.65 TYPE 2 DIABETES MELLITUS WITH HYPERGLYCEMIA, WITH LONG-TERM CURRENT USE OF INSULIN (H): ICD-10-CM

## 2020-01-07 DIAGNOSIS — B20 AIDS (ACQUIRED IMMUNE DEFICIENCY SYNDROME) (H): ICD-10-CM

## 2020-01-07 LAB
ANION GAP SERPL CALCULATED.3IONS-SCNC: 6 MMOL/L (ref 3–14)
BASOPHILS # BLD AUTO: 0.1 10E9/L (ref 0–0.2)
BASOPHILS NFR BLD AUTO: 1.2 %
BUN SERPL-MCNC: 9 MG/DL (ref 7–30)
CALCIUM SERPL-MCNC: 9.4 MG/DL (ref 8.5–10.1)
CHLORIDE SERPL-SCNC: 103 MMOL/L (ref 94–109)
CO2 SERPL-SCNC: 26 MMOL/L (ref 20–32)
CREAT SERPL-MCNC: 0.75 MG/DL (ref 0.66–1.25)
DIFFERENTIAL METHOD BLD: ABNORMAL
EOSINOPHIL # BLD AUTO: 0.8 10E9/L (ref 0–0.7)
EOSINOPHIL NFR BLD AUTO: 10.5 %
ERYTHROCYTE [DISTWIDTH] IN BLOOD BY AUTOMATED COUNT: 12.8 % (ref 10–15)
GFR SERPL CREATININE-BSD FRML MDRD: >90 ML/MIN/{1.73_M2}
GLUCOSE SERPL-MCNC: 238 MG/DL (ref 70–99)
HBA1C MFR BLD: 12.3 % (ref 0–5.6)
HCT VFR BLD AUTO: 42.2 % (ref 40–53)
HGB BLD-MCNC: 14.7 G/DL (ref 13.3–17.7)
IMM GRANULOCYTES # BLD: 0.1 10E9/L (ref 0–0.4)
IMM GRANULOCYTES NFR BLD: 0.8 %
LYMPHOCYTES # BLD AUTO: 2.7 10E9/L (ref 0.8–5.3)
LYMPHOCYTES NFR BLD AUTO: 37.1 %
MCH RBC QN AUTO: 29.9 PG (ref 26.5–33)
MCHC RBC AUTO-ENTMCNC: 34.8 G/DL (ref 31.5–36.5)
MCV RBC AUTO: 86 FL (ref 78–100)
MONOCYTES # BLD AUTO: 0.5 10E9/L (ref 0–1.3)
MONOCYTES NFR BLD AUTO: 7 %
NEUTROPHILS # BLD AUTO: 3.2 10E9/L (ref 1.6–8.3)
NEUTROPHILS NFR BLD AUTO: 43.4 %
NRBC # BLD AUTO: 0 10*3/UL
NRBC BLD AUTO-RTO: 0 /100
PLATELET # BLD AUTO: 459 10E9/L (ref 150–450)
POTASSIUM SERPL-SCNC: 3.7 MMOL/L (ref 3.4–5.3)
RBC # BLD AUTO: 4.92 10E12/L (ref 4.4–5.9)
SODIUM SERPL-SCNC: 134 MMOL/L (ref 133–144)
WBC # BLD AUTO: 7.3 10E9/L (ref 4–11)

## 2020-01-07 RX ORDER — AZITHROMYCIN 250 MG/1
TABLET, FILM COATED ORAL
Qty: 6 TABLET | Refills: 0 | Status: SHIPPED | OUTPATIENT
Start: 2020-01-07 | End: 2020-01-14

## 2020-01-07 RX ORDER — FAMOTIDINE 20 MG/1
20 TABLET, FILM COATED ORAL 2 TIMES DAILY
Qty: 60 TABLET | Refills: 1 | Status: SHIPPED | OUTPATIENT
Start: 2020-01-07 | End: 2020-03-10

## 2020-01-07 ASSESSMENT — MIFFLIN-ST. JEOR: SCORE: 1468.59
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
## 2020-01-07 NOTE — PATIENT INSTRUCTIONS
Pneumonia left lower lobe diagnosed on 12/26 with incompleted therapy  Reordered Augmentin. Added Zithromax given duration of symptoms, HIV status and current guidelines  Follow with ID for AIDs  Go to [facility] for labs: basic metabolic profile, complete blood count with differential, 3 month blood sugar and CD4    Gastroesophageal reflux  Begin famotodine 20mg daily    Diabetes  A1c. Follow with endocrine as planned this week    Follow with infectious disease at end of month as planned.

## 2020-01-07 NOTE — NURSING NOTE
"52 year old  Chief Complaint   Patient presents with     Cough     Pt. presents to the clinic today with a continued cough. Hx of pneumonia.        Blood pressure 104/69, pulse 110, temperature 97.9  F (36.6  C), temperature source Oral, resp. rate 17, height 1.605 m (5' 3.2\"), weight 73 kg (161 lb), SpO2 100 %. Body mass index is 28.34 kg/m .  BP completed using cuff size:    Patient Active Problem List   Diagnosis     Allergic rhinitis due to other allergen     Brain lesion     Toxoplasma encephalitis (H)     Pulmonary nodules     Human immunodeficiency virus I infection (H)     Folliculitis     Prurigo nodularis     Epidermal cyst     Shoulder joint pain, unspecified laterality     CNS toxoplasmosis (H)     Constipation     Non-intractable vomiting with nausea, vomiting of unspecified type     Thrush     Insomnia, unspecified insomnia     Bowel obstruction (H)     Toxoplasmosis     Gastroesophageal reflux disease     Headache     Aphthous ulcer     Type 2 diabetes mellitus (H)     Small bowel obstruction (H)     SBO (small bowel obstruction) (H)     Slow transit constipation     Periungual wart     Herpes zoster     Erectile dysfunction, unspecified erectile dysfunction type     Insomnia, unspecified type     Preventative health care     Pain of right thumb     Rupture of ulnar collateral ligament of right thumb     Aftercare following surgery of the musculoskeletal system     Panic disorder     Generalized anxiety disorder     Major depressive disorder, single episode, unspecified     Abdominal pain     Acute appendicitis with localized peritonitis     S/P appendectomy     Abdominal abscess     Appendicitis     Otitis media     Horseshoe tear of retina of left eye without detachment     Adynamic ileus (H)     Pneumonia     Hyperglycemia       Wt Readings from Last 2 Encounters:   01/07/20 73 kg (161 lb)   01/01/20 71.8 kg (158 lb 5 oz)     BP Readings from Last 3 Encounters:   01/07/20 104/69   01/01/20 (!) " 159/99   12/29/19 118/77       Allergies   Allergen Reactions     Metformin      Abdominal pain     Milk [Lac Bovis] Hives     Tylenol [Acetaminophen] Itching     Dulaglutide Rash       Current Outpatient Medications   Medication     bictegravir-emtricitabine-tenofovir (BIKTARVY) -25 MG per tablet     insulin glargine (LANTUS PEN) 100 UNIT/ML pen     insulin pen needle (B-D U/F) 31G X 8 MM miscellaneous     insulin pen needle (BD PEN NEEDLE SCOTT 2ND GEN) 32G X 4 MM miscellaneous     NOVOLOG FLEXPEN 100 UNIT/ML soln     amoxicillin-clavulanate (AUGMENTIN) 875-125 MG tablet     blood glucose (NO BRAND SPECIFIED) lancets standard     blood glucose (NO BRAND SPECIFIED) test strip     blood glucose monitoring (NO BRAND SPECIFIED) meter device kit     chlorhexidine (HIBICLENS) 4 % liquid     No current facility-administered medications for this visit.        Social History     Tobacco Use     Smoking status: Current Some Day Smoker     Packs/day: 0.25     Types: Cigarettes     Last attempt to quit: 10/28/2016     Years since quitting: 3.1     Smokeless tobacco: Former User   Substance Use Topics     Alcohol use: No     Alcohol/week: 0.0 standard drinks     Comment: Last etoh in 2007     Drug use: Not Currently     Types: Marijuana       Honoring Choices - Health Care Directive Guide offered to patient at time of visit.    Health Maintenance Due   Topic Date Due     PREVENTIVE CARE VISIT  11/27/1965     DIABETIC FOOT EXAM  11/27/1965     DEPRESSION ACTION PLAN  11/27/1965     ZOSTER IMMUNIZATION (1 of 2) 11/27/2015     EYE EXAM  05/30/2019     LIPID  08/15/2019     INFLUENZA VACCINE (1) 09/01/2019     MICROALBUMIN  11/06/2019     PHQ-9  12/07/2019       Immunization History   Administered Date(s) Administered     HepB 03/10/2016, 06/16/2016     HepB-Adult 08/31/2017     Influenza (IIV3) PF 10/17/2016     Influenza Vaccine IM > 6 months Valent IIV4 12/22/2015, 12/14/2017     Mantoux Tuberculin Skin Test 04/12/2015      Meningococcal (Menactra ) 08/31/2015, 03/10/2016     Pneumo Conj 13-V (2010&after) 03/10/2016     Pneumococcal 23 valent 06/16/2016     TDAP Vaccine (Boostrix) 10/17/2016       No results found for: PAP      Recent Labs   Lab Test 01/01/20  1232 11/12/19  0554  11/10/19  2124 10/31/19  1120 10/25/19  1837 10/17/19  06/07/19  0827  08/15/18  1402 07/19/18  1147  02/15/18  2158  08/11/17  1343  05/23/17  1247  08/18/16  1012   A1C  --   --   --   --   --   --  10.4*  --  10.2*  --   --  8.9*  --   --   --  6.0   < >  --    < > 7.0*   LDL  --   --   --   --   --   --   --   --   --   --  105*  --   --   --   --  88  --   --   --  72   HDL  --   --   --   --   --   --   --   --   --   --  38*  --   --   --   --  37*  --   --   --  33*   TRIG  --   --   --   --   --   --   --   --   --   --  190*  --   --   --   --  345*  --   --   --  377*   ALT  --   --   --  25 24 22  --    < > 24   < > 28  --    < >  --    < > 23   < >  --    < > 18   CR 0.67 0.62*   < > 0.77 0.69 0.87  --    < > 0.82   < > 0.76  --    < > 0.69   < > 0.93   < >  --    < > 0.90   GFRESTIMATED >90 >90   < > >90 >90 >90  --    < > >90   < > >90  --    < > >90   < > 86   < >  --    < > 89   GFRESTBLACK >90 >90   < > >90 >90 >90  --    < > >90   < > >90  --    < > >90   < > >90  African American GFR Calc     < >  --    < > >90   GFR Calc     ALBUMIN  --   --   --  3.2* 3.6 3.3*  --    < > 3.8   < > 3.8  --    < >  --    < > 4.0   < >  --    < > 4.2   POTASSIUM 4.0 3.2*   < > 3.7 3.2* 3.8  --    < > 3.9   < > 3.5  --    < > 3.8   < > 3.4   < >  --    < > 3.9   TSH  --   --   --   --   --   --   --   --   --   --   --   --   --  3.57  --   --   --  3.90  --   --     < > = values in this interval not displayed.       PHQ-2 ( 1999 [employer]) 6/7/2019 6/7/2019   Q1: Little interest or pleasure in doing things 0 0   Q2: Feeling down, depressed or hopeless 0 0   PHQ-2 Score 0 0       PHQ-9 SCORE 6/22/2017 8/2/2018 6/7/2019 6/7/2019   PHQ-9 Total  Score 17 0 0 0       DAVID-7 SCORE 1/23/2017 6/22/2017 6/7/2019   Total Score 20 17 0       No flowsheet data found.    Zaria Chandler CMA  January 7, 2020 2:55 PM

## 2020-01-08 LAB
CD3 CELLS # BLD: 1571 CELLS/UL (ref 603–2990)
CD3 CELLS NFR BLD: 57 % (ref 49–84)
CD3+CD4+ CELLS # BLD: 438 CELLS/UL (ref 441–2156)
CD3+CD4+ CELLS NFR BLD: 16 % (ref 28–63)
CD3+CD4+ CELLS/CD3+CD8+ CLL BLD: 0.42 % (ref 1.4–2.6)
CD3+CD8+ CELLS # BLD: 1052 CELLS/UL (ref 125–1312)
CD3+CD8+ CELLS NFR BLD: 38 % (ref 10–40)
IFC SPECIMEN: ABNORMAL

## 2020-01-13 ENCOUNTER — DOCUMENTATION ONLY (OUTPATIENT)
Dept: CARE COORDINATION | Facility: CLINIC | Age: 55
End: 2020-01-13

## 2020-01-13 ENCOUNTER — HOSPITAL ENCOUNTER (EMERGENCY)
Facility: CLINIC | Age: 55
Discharge: HOME | End: 2020-01-13
Attending: EMERGENCY MEDICINE | Admitting: EMERGENCY MEDICINE
Payer: COMMERCIAL

## 2020-01-13 VITALS
WEIGHT: 162 LBS | TEMPERATURE: 98.7 F | BODY MASS INDEX: 27.66 KG/M2 | RESPIRATION RATE: 18 BRPM | OXYGEN SATURATION: 99 % | HEART RATE: 85 BPM | HEIGHT: 64 IN | DIASTOLIC BLOOD PRESSURE: 94 MMHG | SYSTOLIC BLOOD PRESSURE: 133 MMHG

## 2020-01-13 DIAGNOSIS — R09.82 POST-NASAL DRIP: ICD-10-CM

## 2020-01-13 DIAGNOSIS — G47.33 OSA (OBSTRUCTIVE SLEEP APNEA): ICD-10-CM

## 2020-01-13 PROCEDURE — 99282 EMERGENCY DEPT VISIT SF MDM: CPT | Performed by: EMERGENCY MEDICINE

## 2020-01-13 PROCEDURE — 99283 EMERGENCY DEPT VISIT LOW MDM: CPT | Mod: Z6 | Performed by: EMERGENCY MEDICINE

## 2020-01-13 RX ORDER — CETIRIZINE HYDROCHLORIDE 10 MG/1
10 TABLET ORAL DAILY
Qty: 30 TABLET | Refills: 0 | Status: SHIPPED | OUTPATIENT
Start: 2020-01-13 | End: 2020-02-28

## 2020-01-13 ASSESSMENT — ENCOUNTER SYMPTOMS
WHEEZING: 0
DIARRHEA: 0
PALPITATIONS: 0
SHORTNESS OF BREATH: 1
CHEST TIGHTNESS: 0
DYSURIA: 0
WOUND: 0
VOMITING: 0
ABDOMINAL PAIN: 0
CHOKING: 0
NECK PAIN: 0
STRIDOR: 0
NECK STIFFNESS: 0
NAUSEA: 0
FEVER: 0
DIZZINESS: 0
HEADACHES: 0

## 2020-01-13 ASSESSMENT — MIFFLIN-ST. JEOR: SCORE: 1485.83

## 2020-01-13 NOTE — ED NOTES
Bed: ED23  Expected date:   Expected time:   Means of arrival:   Comments:  [id number]  52 M  Sleep apnea, difficulty breathing  Yellow

## 2020-01-13 NOTE — DISCHARGE INSTRUCTIONS
Please call your primary doctor in the morning to discuss getting her CPAP machine    Return to the emergency department if your shortness of breath worsens, if you develop chest pain, fevers, chills or if you have any further concerns

## 2020-01-13 NOTE — ED PROVIDER NOTES
"  History     Chief Complaint   Patient presents with     Snoring     HPI  Andrew Lockwood is a 52 year old male who has a past medical history of diabetes, sleep apnea, HIV presenting with episode of sleep apnea.  The patient said that he woke up coughing.  This is been going on for years, he has not had a sleep apnea machine his CPAP for several years.  Denies fevers, chills, chest pain, shortness of breath at this point.  He has no leg swelling or leg pain.  No recent travel.  He does not think that he has a pulmonary embolism.  The patient was currently staying at a rehab center, he notified staff and they called the medics.  The patient says he feels fine right now.  He does have some congestion and feeling as if snot is running down the back of his throat, he thinks this is causing his symptoms.  He is able to talk his primary care provider to get a sleep apnea machine.  Patient also states that his roommate says he has been stopping breathing periodically at night.    I have reviewed the Medications, Allergies, Past Medical and Surgical History, and Social History in the Epic system.    Review of Systems   Constitutional: Negative for fever.   Respiratory: Positive for shortness of breath (resolved). Negative for choking, chest tightness, wheezing and stridor.    Cardiovascular: Negative for chest pain and palpitations.   Gastrointestinal: Negative for abdominal pain, diarrhea, nausea and vomiting.   Genitourinary: Negative for dysuria.   Musculoskeletal: Negative for neck pain and neck stiffness.   Skin: Negative for rash and wound.   Neurological: Negative for dizziness, syncope and headaches.       Physical Exam   BP: (!) 133/94  Pulse: 85  Temp: 98.7  F (37.1  C)  Resp: 18  Height: 162.6 cm (5' 4\")  Weight: 73.5 kg (162 lb)  SpO2: 99 %      Physical Exam  Physical Exam   Constitutional: oriented to person, place, and time. appears well-developed and well-nourished.   HENT:   Head: Normocephalic and " atraumatic.   Neck: Normal range of motion.   Pulmonary/Chest: Effort normal. No respiratory distress. No wheezing bilaterally.  Cardiac: No murmurs, rubs, gallops. RRR.  Abdominal: Abdomen soft, nontender, nondistended. No rebound tenderness.  MSK: Long bones without deformity or evidence of trauma.  No lower extremity swelling.  No tenderness palpation of the calves.  Neurological: alert and oriented to person, place, and time.   Skin: Skin is warm and dry.   Psychiatric:  normal mood and affect.  behavior is normal. Thought content normal.     ED Course        Procedures          Labs Ordered and Resulted from Time of ED Arrival Up to the Time of Departure from the ED - No data to display         Assessments & Plan (with Medical Decision Making)   MDM  Patient presented after an episode of sleep apnea.  He has been having these episodes for years.  He is staying at a rehab center, the staff was concerned and they called an ambulance.  Patient here is asymptomatic.  He appears well and vitals are stable.  He is no evidence of infection.  Symptoms are not consistent with ACS in light of history of sleep apnea and has not been using CPAP.  He does have some congestion and what sounds like postnasal drip so we discussed antihistamines, I will prescribe this for him.  Patient would like to be discharged and I feel this is reasonable.  Patient will return if symptoms are worsening.  Patient has been taking his other medications including his diabetes medications.    I have reviewed the nursing notes.    I have reviewed the findings, diagnosis, plan and need for follow up with the patient.    New Prescriptions    CETIRIZINE (ZYRTEC) 10 MG TABLET    Take 1 tablet (10 mg) by mouth daily       Final diagnoses:   DANISHA (obstructive sleep apnea)   Post-nasal drip       1/13/2020   [facility], EMERGENCY DEPARTMENT     Venu Wolff MD  01/13/20 0248

## 2020-01-13 NOTE — ED AVS SNAPSHOT
[facility], [place], Emergency Department  [address] [place] [id number]  Phone:  [phone]                                    Andrew Lockwood   MRN: [id number]    Department:  [facility], Emergency Department   Date of Visit:  1/13/2020           After Visit Summary Signature Page    I have received my discharge instructions, and my questions have been answered. I have discussed any challenges I see with this plan with the nurse or doctor.    ..........................................................................................................................................  Patient/Patient Representative Signature      ..........................................................................................................................................  Patient Representative Print Name and Relationship to Patient    ..................................................               ................................................  Date                                   Time    ..........................................................................................................................................  Reviewed by Signature/Title    ...................................................              ..............................................  Date                                               Time          22EPIC Rev 08/18

## 2020-01-14 ENCOUNTER — APPOINTMENT (OUTPATIENT)
Dept: CT IMAGING | Facility: CLINIC | Age: 55
End: 2020-01-14
Attending: EMERGENCY MEDICINE
Payer: COMMERCIAL

## 2020-01-14 ENCOUNTER — APPOINTMENT (OUTPATIENT)
Dept: GENERAL RADIOLOGY | Facility: CLINIC | Age: 55
End: 2020-01-14
Attending: EMERGENCY MEDICINE
Payer: COMMERCIAL

## 2020-01-14 ENCOUNTER — HOSPITAL ENCOUNTER (INPATIENT)
Facility: CLINIC | Age: 55
LOS: 3 days | Discharge: PSYCHIATRIC OR SUBSTANCE USE FACILITY | End: 2020-01-17
Attending: EMERGENCY MEDICINE | Admitting: INTERNAL MEDICINE
Payer: COMMERCIAL

## 2020-01-14 DIAGNOSIS — K56.609 SMALL BOWEL OBSTRUCTION (H): Primary | ICD-10-CM

## 2020-01-14 DIAGNOSIS — Z79.4 TYPE 2 DIABETES MELLITUS WITHOUT COMPLICATION, WITH LONG-TERM CURRENT USE OF INSULIN (H): ICD-10-CM

## 2020-01-14 DIAGNOSIS — K56.600 PARTIAL SMALL BOWEL OBSTRUCTION (H): ICD-10-CM

## 2020-01-14 DIAGNOSIS — R73.9 HYPERGLYCEMIA: ICD-10-CM

## 2020-01-14 DIAGNOSIS — K56.609 SBO (SMALL BOWEL OBSTRUCTION) (H): ICD-10-CM

## 2020-01-14 DIAGNOSIS — E11.9 TYPE 2 DIABETES MELLITUS WITHOUT COMPLICATION, WITH LONG-TERM CURRENT USE OF INSULIN (H): ICD-10-CM

## 2020-01-14 LAB
ALBUMIN SERPL-MCNC: 3.7 G/DL (ref 3.4–5)
ALBUMIN UR-MCNC: NEGATIVE MG/DL
ALP SERPL-CCNC: 140 U/L (ref 40–150)
ALT SERPL W P-5'-P-CCNC: 25 U/L (ref 0–70)
ANION GAP SERPL CALCULATED.3IONS-SCNC: 6 MMOL/L (ref 3–14)
APPEARANCE UR: CLEAR
AST SERPL W P-5'-P-CCNC: 9 U/L (ref 0–45)
BASOPHILS # BLD AUTO: 0.1 10E9/L (ref 0–0.2)
BASOPHILS NFR BLD AUTO: 0.7 %
BILIRUB SERPL-MCNC: 0.6 MG/DL (ref 0.2–1.3)
BILIRUB UR QL STRIP: NEGATIVE
BUN SERPL-MCNC: 12 MG/DL (ref 7–30)
CALCIUM SERPL-MCNC: 9.7 MG/DL (ref 8.5–10.1)
CHLORIDE SERPL-SCNC: 101 MMOL/L (ref 94–109)
CO2 BLDCOV-SCNC: 27 MMOL/L (ref 21–28)
CO2 SERPL-SCNC: 27 MMOL/L (ref 20–32)
COLOR UR AUTO: ABNORMAL
CREAT BLD-MCNC: 0.6 MG/DL (ref 0.66–1.25)
CREAT SERPL-MCNC: 0.61 MG/DL (ref 0.66–1.25)
CREAT SERPL-MCNC: 0.68 MG/DL (ref 0.66–1.25)
DIFFERENTIAL METHOD BLD: NORMAL
EOSINOPHIL # BLD AUTO: 0.3 10E9/L (ref 0–0.7)
EOSINOPHIL NFR BLD AUTO: 4.9 %
ERYTHROCYTE [DISTWIDTH] IN BLOOD BY AUTOMATED COUNT: 13.1 % (ref 10–15)
GFR SERPL CREATININE-BSD FRML MDRD: >90 ML/MIN/{1.73_M2}
GLUCOSE BLDC GLUCOMTR-MCNC: 103 MG/DL (ref 70–99)
GLUCOSE BLDC GLUCOMTR-MCNC: 214 MG/DL (ref 70–99)
GLUCOSE BLDC GLUCOMTR-MCNC: 235 MG/DL (ref 70–99)
GLUCOSE BLDC GLUCOMTR-MCNC: 348 MG/DL (ref 70–99)
GLUCOSE SERPL-MCNC: 336 MG/DL (ref 70–99)
GLUCOSE UR STRIP-MCNC: >1000 MG/DL
HCT VFR BLD AUTO: 43.5 % (ref 40–53)
HGB BLD-MCNC: 14.9 G/DL (ref 13.3–17.7)
HGB UR QL STRIP: ABNORMAL
IMM GRANULOCYTES # BLD: 0.1 10E9/L (ref 0–0.4)
IMM GRANULOCYTES NFR BLD: 1.3 %
KETONES UR STRIP-MCNC: NEGATIVE MG/DL
LACTATE BLD-SCNC: 1.6 MMOL/L (ref 0.7–2.1)
LEUKOCYTE ESTERASE UR QL STRIP: NEGATIVE
LIPASE SERPL-CCNC: 184 U/L (ref 73–393)
LYMPHOCYTES # BLD AUTO: 2.2 10E9/L (ref 0.8–5.3)
LYMPHOCYTES NFR BLD AUTO: 32.3 %
MAGNESIUM SERPL-MCNC: 1.8 MG/DL (ref 1.6–2.3)
MCH RBC QN AUTO: 30.2 PG (ref 26.5–33)
MCHC RBC AUTO-ENTMCNC: 34.3 G/DL (ref 31.5–36.5)
MCV RBC AUTO: 88 FL (ref 78–100)
MONOCYTES # BLD AUTO: 0.5 10E9/L (ref 0–1.3)
MONOCYTES NFR BLD AUTO: 6.8 %
MUCOUS THREADS #/AREA URNS LPF: PRESENT /LPF
NEUTROPHILS # BLD AUTO: 3.7 10E9/L (ref 1.6–8.3)
NEUTROPHILS NFR BLD AUTO: 54 %
NITRATE UR QL: NEGATIVE
NRBC # BLD AUTO: 0 10*3/UL
NRBC BLD AUTO-RTO: 0 /100
PCO2 BLDV: 46 MM HG (ref 40–50)
PH BLDV: 7.38 PH (ref 7.32–7.43)
PH UR STRIP: 7 PH (ref 5–7)
PLATELET # BLD AUTO: 316 10E9/L (ref 150–450)
PLATELET # BLD AUTO: 362 10E9/L (ref 150–450)
PO2 BLDV: 46 MM HG (ref 25–47)
POTASSIUM SERPL-SCNC: 3.7 MMOL/L (ref 3.4–5.3)
PROT SERPL-MCNC: 7.9 G/DL (ref 6.8–8.8)
RBC # BLD AUTO: 4.93 10E12/L (ref 4.4–5.9)
RBC #/AREA URNS AUTO: 1 /HPF (ref 0–2)
SAO2 % BLDV FROM PO2: 81 %
SODIUM SERPL-SCNC: 134 MMOL/L (ref 133–144)
SOURCE: ABNORMAL
SP GR UR STRIP: 1 (ref 1–1.03)
SQUAMOUS #/AREA URNS AUTO: <1 /HPF (ref 0–1)
UROBILINOGEN UR STRIP-MCNC: NORMAL MG/DL (ref 0–2)
WBC # BLD AUTO: 6.8 10E9/L (ref 4–11)
WBC #/AREA URNS AUTO: <1 /HPF (ref 0–5)

## 2020-01-14 PROCEDURE — C9113 INJ PANTOPRAZOLE SODIUM, VIA: HCPCS | Performed by: EMERGENCY MEDICINE

## 2020-01-14 PROCEDURE — 99285 EMERGENCY DEPT VISIT HI MDM: CPT | Mod: Z6 | Performed by: EMERGENCY MEDICINE

## 2020-01-14 PROCEDURE — 96374 THER/PROPH/DIAG INJ IV PUSH: CPT | Performed by: EMERGENCY MEDICINE

## 2020-01-14 PROCEDURE — 82565 ASSAY OF CREATININE: CPT

## 2020-01-14 PROCEDURE — 82565 ASSAY OF CREATININE: CPT | Performed by: INTERNAL MEDICINE

## 2020-01-14 PROCEDURE — 25000132 ZZH RX MED GY IP 250 OP 250 PS 637: Performed by: EMERGENCY MEDICINE

## 2020-01-14 PROCEDURE — 99285 EMERGENCY DEPT VISIT HI MDM: CPT | Mod: 25 | Performed by: EMERGENCY MEDICINE

## 2020-01-14 PROCEDURE — 25000132 ZZH RX MED GY IP 250 OP 250 PS 637: Performed by: STUDENT IN AN ORGANIZED HEALTH CARE EDUCATION/TRAINING PROGRAM

## 2020-01-14 PROCEDURE — 25000125 ZZHC RX 250: Performed by: EMERGENCY MEDICINE

## 2020-01-14 PROCEDURE — 25000128 H RX IP 250 OP 636: Performed by: EMERGENCY MEDICINE

## 2020-01-14 PROCEDURE — 74177 CT ABD & PELVIS W/CONTRAST: CPT

## 2020-01-14 PROCEDURE — 83735 ASSAY OF MAGNESIUM: CPT | Performed by: EMERGENCY MEDICINE

## 2020-01-14 PROCEDURE — 96372 THER/PROPH/DIAG INJ SC/IM: CPT | Performed by: EMERGENCY MEDICINE

## 2020-01-14 PROCEDURE — 25000128 H RX IP 250 OP 636: Performed by: STUDENT IN AN ORGANIZED HEALTH CARE EDUCATION/TRAINING PROGRAM

## 2020-01-14 PROCEDURE — 00000146 ZZHCL STATISTIC GLUCOSE BY METER IP

## 2020-01-14 PROCEDURE — 99207 ZZC CDG-MDM COMPONENT: MEETS LOW - DOWN CODED: CPT | Performed by: INTERNAL MEDICINE

## 2020-01-14 PROCEDURE — 85025 COMPLETE CBC W/AUTO DIFF WBC: CPT | Performed by: EMERGENCY MEDICINE

## 2020-01-14 PROCEDURE — 99222 1ST HOSP IP/OBS MODERATE 55: CPT | Mod: AI | Performed by: INTERNAL MEDICINE

## 2020-01-14 PROCEDURE — 81001 URINALYSIS AUTO W/SCOPE: CPT | Performed by: EMERGENCY MEDICINE

## 2020-01-14 PROCEDURE — 25000131 ZZH RX MED GY IP 250 OP 636 PS 637: Performed by: EMERGENCY MEDICINE

## 2020-01-14 PROCEDURE — 12000001 ZZH R&B MED SURG/OB UMMC

## 2020-01-14 PROCEDURE — 85049 AUTOMATED PLATELET COUNT: CPT | Performed by: INTERNAL MEDICINE

## 2020-01-14 PROCEDURE — 83605 ASSAY OF LACTIC ACID: CPT

## 2020-01-14 PROCEDURE — 40000986 XR ABDOMEN PORT 1 VW

## 2020-01-14 PROCEDURE — 80053 COMPREHEN METABOLIC PANEL: CPT | Performed by: EMERGENCY MEDICINE

## 2020-01-14 PROCEDURE — 96361 HYDRATE IV INFUSION ADD-ON: CPT | Performed by: EMERGENCY MEDICINE

## 2020-01-14 PROCEDURE — 82803 BLOOD GASES ANY COMBINATION: CPT

## 2020-01-14 PROCEDURE — 25800030 ZZH RX IP 258 OP 636: Performed by: EMERGENCY MEDICINE

## 2020-01-14 PROCEDURE — 83690 ASSAY OF LIPASE: CPT | Performed by: EMERGENCY MEDICINE

## 2020-01-14 RX ORDER — ONDANSETRON 2 MG/ML
4 INJECTION INTRAMUSCULAR; INTRAVENOUS EVERY 6 HOURS PRN
Status: DISCONTINUED | OUTPATIENT
Start: 2020-01-14 | End: 2020-01-17 | Stop reason: HOSPADM

## 2020-01-14 RX ORDER — POTASSIUM CHLORIDE 29.8 MG/ML
20 INJECTION INTRAVENOUS
Status: DISCONTINUED | OUTPATIENT
Start: 2020-01-14 | End: 2020-01-17 | Stop reason: HOSPADM

## 2020-01-14 RX ORDER — MAGNESIUM SULFATE HEPTAHYDRATE 40 MG/ML
4 INJECTION, SOLUTION INTRAVENOUS EVERY 4 HOURS PRN
Status: DISCONTINUED | OUTPATIENT
Start: 2020-01-14 | End: 2020-01-17 | Stop reason: HOSPADM

## 2020-01-14 RX ORDER — NICOTINE POLACRILEX 4 MG
15-30 LOZENGE BUCCAL
Status: DISCONTINUED | OUTPATIENT
Start: 2020-01-14 | End: 2020-01-17 | Stop reason: HOSPADM

## 2020-01-14 RX ORDER — IOPAMIDOL 755 MG/ML
99 INJECTION, SOLUTION INTRAVASCULAR ONCE
Status: COMPLETED | OUTPATIENT
Start: 2020-01-14 | End: 2020-01-14

## 2020-01-14 RX ORDER — IBUPROFEN 200 MG
600 TABLET ORAL EVERY 4 HOURS PRN
COMMUNITY
End: 2020-06-25

## 2020-01-14 RX ORDER — POTASSIUM CHLORIDE 1.5 G/1.58G
20-40 POWDER, FOR SOLUTION ORAL
Status: DISCONTINUED | OUTPATIENT
Start: 2020-01-14 | End: 2020-01-17 | Stop reason: HOSPADM

## 2020-01-14 RX ORDER — NALOXONE HYDROCHLORIDE 0.4 MG/ML
.1-.4 INJECTION, SOLUTION INTRAMUSCULAR; INTRAVENOUS; SUBCUTANEOUS
Status: DISCONTINUED | OUTPATIENT
Start: 2020-01-14 | End: 2020-01-17 | Stop reason: HOSPADM

## 2020-01-14 RX ORDER — ONDANSETRON 4 MG/1
4 TABLET, ORALLY DISINTEGRATING ORAL EVERY 6 HOURS PRN
Status: DISCONTINUED | OUTPATIENT
Start: 2020-01-14 | End: 2020-01-17 | Stop reason: HOSPADM

## 2020-01-14 RX ORDER — POTASSIUM CHLORIDE 7.45 MG/ML
10 INJECTION INTRAVENOUS
Status: DISCONTINUED | OUTPATIENT
Start: 2020-01-14 | End: 2020-01-17 | Stop reason: HOSPADM

## 2020-01-14 RX ORDER — LIDOCAINE 40 MG/G
CREAM TOPICAL
Status: DISCONTINUED | OUTPATIENT
Start: 2020-01-14 | End: 2020-01-17 | Stop reason: HOSPADM

## 2020-01-14 RX ORDER — SODIUM CHLORIDE 9 MG/ML
1000 INJECTION, SOLUTION INTRAVENOUS CONTINUOUS
Status: DISCONTINUED | OUTPATIENT
Start: 2020-01-14 | End: 2020-01-16

## 2020-01-14 RX ORDER — POTASSIUM CL/LIDO/0.9 % NACL 10MEQ/0.1L
10 INTRAVENOUS SOLUTION, PIGGYBACK (ML) INTRAVENOUS
Status: DISCONTINUED | OUTPATIENT
Start: 2020-01-14 | End: 2020-01-17 | Stop reason: HOSPADM

## 2020-01-14 RX ORDER — ACETAMINOPHEN 650 MG/1
650 SUPPOSITORY RECTAL EVERY 4 HOURS PRN
Status: DISCONTINUED | OUTPATIENT
Start: 2020-01-14 | End: 2020-01-17 | Stop reason: HOSPADM

## 2020-01-14 RX ORDER — DEXTROSE MONOHYDRATE 25 G/50ML
25-50 INJECTION, SOLUTION INTRAVENOUS
Status: DISCONTINUED | OUTPATIENT
Start: 2020-01-14 | End: 2020-01-17 | Stop reason: HOSPADM

## 2020-01-14 RX ORDER — ACETAMINOPHEN 325 MG/1
650 TABLET ORAL EVERY 4 HOURS PRN
Status: DISCONTINUED | OUTPATIENT
Start: 2020-01-14 | End: 2020-01-17 | Stop reason: HOSPADM

## 2020-01-14 RX ORDER — POTASSIUM CHLORIDE 750 MG/1
20-40 TABLET, EXTENDED RELEASE ORAL
Status: DISCONTINUED | OUTPATIENT
Start: 2020-01-14 | End: 2020-01-17 | Stop reason: HOSPADM

## 2020-01-14 RX ADMIN — PANTOPRAZOLE SODIUM 40 MG: 40 INJECTION, POWDER, FOR SOLUTION INTRAVENOUS at 14:25

## 2020-01-14 RX ADMIN — ACETAMINOPHEN 650 MG: 325 TABLET, FILM COATED ORAL at 20:13

## 2020-01-14 RX ADMIN — INSULIN ASPART 5 UNITS: 100 INJECTION, SOLUTION INTRAVENOUS; SUBCUTANEOUS at 13:50

## 2020-01-14 RX ADMIN — SODIUM CHLORIDE 1000 ML: 9 INJECTION, SOLUTION INTRAVENOUS at 11:31

## 2020-01-14 RX ADMIN — LIDOCAINE HYDROCHLORIDE 30 ML: 20 SOLUTION ORAL; TOPICAL at 11:31

## 2020-01-14 RX ADMIN — BICTEGRAVIR SODIUM, EMTRICITABINE, AND TENOFOVIR ALAFENAMIDE FUMARATE 1 TABLET: 50; 200; 25 TABLET ORAL at 20:13

## 2020-01-14 RX ADMIN — IOPAMIDOL 99 ML: 755 INJECTION, SOLUTION INTRAVENOUS at 11:46

## 2020-01-14 RX ADMIN — SODIUM CHLORIDE 1000 ML: 900 INJECTION INTRAVENOUS at 14:24

## 2020-01-14 RX ADMIN — ENOXAPARIN SODIUM 40 MG: 40 INJECTION SUBCUTANEOUS at 20:15

## 2020-01-14 ASSESSMENT — ACTIVITIES OF DAILY LIVING (ADL)
COGNITION: 0 - NO COGNITION ISSUES REPORTED
SWALLOWING: 0-->SWALLOWS FOODS/LIQUIDS WITHOUT DIFFICULTY
FALL_HISTORY_WITHIN_LAST_SIX_MONTHS: YES
BATHING: 0-->INDEPENDENT
RETIRED_EATING: 0-->INDEPENDENT
TOILETING: 0-->INDEPENDENT
DRESS: 0-->INDEPENDENT
TRANSFERRING: 0-->INDEPENDENT
NUMBER_OF_TIMES_PATIENT_HAS_FALLEN_WITHIN_LAST_SIX_MONTHS: 2
AMBULATION: 0-->INDEPENDENT
RETIRED_COMMUNICATION: 0-->UNDERSTANDS/COMMUNICATES WITHOUT DIFFICULTY

## 2020-01-14 ASSESSMENT — MIFFLIN-ST. JEOR
SCORE: 1482.65
SCORE: 1476.76

## 2020-01-14 NOTE — ED PROVIDER NOTES
[place] EMERGENCY DEPARTMENT ([facility])  January 14, 2020    History     Chief Complaint   Patient presents with     Heartburn     Abdominal Pain     HPI  Andrew Lockwood is a 52 year old male with history notable for HIV, DM 2, SBO, GERD, and appendectomy, who presents to the ED for 2 hours of ongoing left-sided abdominal pain and reflux symptoms.  Patient states that he has been taking Pepcid twice a day, and last took it 2 hours prior to arrival.  He states that he is continuing to have reflux symptoms despite taking the Pepcid.  Patient also states that he feels like one of his insulin medications is not working.  He states he is currently taking 40 units of Lantus at night and morning and 7-9 units of  NovoLog before meals.  He states that his blood sugar has been elevated for the past 2 weeks, but does not measure his blood sugar at home.  He states he has requested to get a meter for home use, and states he was told to ask during his upcoming Endocrinology appointment on 1/16/20.  With regards to his recent diagnosis of pneumonia, patient states that his symptoms have been improving and is still currently taking antibiotics for this.  He reports that he was having diarrhea, but states this resolved 3 days ago.  He states his last bowel movement was very small and formed this morning.  He states he has not passed flatus today.  He otherwise denies dysuria, hematuria, chest pain, shortness of breath, nausea, vomiting, blood in the stool, new cough, or previous history of ulcers.  He denies any melena, hematochezia, or hematemesis.  He states he has had prior history of bowel obstruction in the past without surgical intervention.  He states his antibiotics stopped yesterday for the pneumonia.  He denies any current cough or fevers.    Per chart review, patient was seen at [facility] on 1/7/2020, and patient was just started on Pepcid for his GERD.  Patient's pneumonia was treated with  Augmentin and Z-Mulugeta.     PAST MEDICAL HISTORY  Past Medical History:   Diagnosis Date     AIDS (H)      Allergic rhinitis due to other allergen     DNS     Chronic abdominal pain      CNS toxoplasmosis (H)      Diabetes type 2, controlled (H)      GERD (gastroesophageal reflux disease)      HIV (human immunodeficiency virus infection) (H)      Periungual wart      Sleep apnea     doesn't use CPAP     PAST SURGICAL HISTORY  Past Surgical History:   Procedure Laterality Date     C NONSPECIFIC PROCEDURE      right forearm fracture     COLONOSCOPY Left 1/22/2016    Procedure: COMBINED COLONOSCOPY, SINGLE OR MULTIPLE BIOPSY/POLYPECTOMY BY BIOPSY;  Surgeon: Clark Saini MD;  Location: [facility] GI     HC EXPLORE UNDESC TESTIS,INGUIN/SCROTAL       LAPAROSCOPIC APPENDECTOMY N/A 1/31/2018    Procedure: LAPAROSCOPIC APPENDECTOMY;  LAPAROSCOPIC APPENDECTOMY;  Surgeon: Dawn Holt MD;  Location: [facility] OR     LAPAROSCOPY DIAGNOSTIC (GENERAL) N/A 7/26/2016    Procedure: LAPAROSCOPY DIAGNOSTIC (GENERAL);  Surgeon: Susannah Arriaga MD;  Location: [facility] OR     LAPAROSCOPY DIAGNOSTIC (GENERAL) N/A 4/16/2018    Procedure: LAPAROSCOPY DIAGNOSTIC (GENERAL);  Diagnostic laparoscopy and lysis of adhesions;  Surgeon: Prince Dowling MD;  Location: [facility] OR     OPTICAL TRACKING SYSTEM CRANIOTOMY, EXCISE TUMOR, COMBINED Left 4/10/2015    Procedure: COMBINED OPTICAL TRACKING SYSTEM CRANIOTOMY, EXCISE TUMOR;  Surgeon: Mirlande Colmenares MD;  Location: [facility] OR     REPAIR GAMEKEEPER'S THUMB Right 12/2/2016    Procedure: REPAIR LIGAMENT ULNAR COLLATERAL THUMB (GAMEKEEPER'S);  Surgeon: Evin Zamorano MD;  Location: [facility] OR     FAMILY HISTORY  Family History   Problem Relation Age of Onset     Diabetes Brother      Diabetes Father      Alzheimer Disease Father      Unknown/Adopted Mother      Diabetes Paternal Grandfather      Cancer No family hx of         no skin cancer     Skin Cancer No family hx of         no famiy hx  "of skin cancer     Glaucoma No family hx of      Macular Degeneration No family hx of      SOCIAL HISTORY  Social History     Tobacco Use     Smoking status: Current Some Day Smoker     Packs/day: 0.25     Types: Cigarettes     Last attempt to quit: 10/28/2016     Years since quitting: 3.2     Smokeless tobacco: Former User   Substance Use Topics     Alcohol use: No     Alcohol/week: 0.0 standard drinks     Comment: Last etoh in 2007     MEDICATIONS  Current Facility-Administered Medications   Medication     sodium chloride 0.9% infusion     Current Outpatient Medications   Medication     amoxicillin-clavulanate (AUGMENTIN) 875-125 MG tablet     azithromycin (ZITHROMAX) 250 MG tablet     bictegravir-emtricitabine-tenofovir (BIKTARVY) -25 MG per tablet     blood glucose (NO BRAND SPECIFIED) lancets standard     blood glucose (NO BRAND SPECIFIED) test strip     blood glucose monitoring (NO BRAND SPECIFIED) meter device kit     cetirizine (ZYRTEC) 10 MG tablet     chlorhexidine (HIBICLENS) 4 % liquid     famotidine (PEPCID) 20 MG tablet     insulin glargine (LANTUS PEN) 100 UNIT/ML pen     insulin pen needle (B-D U/F) 31G X 8 MM miscellaneous     insulin pen needle (BD PEN NEEDLE SCOTT 2ND GEN) 32G X 4 MM miscellaneous     NOVOLOG FLEXPEN 100 UNIT/ML soln     ALLERGIES  Allergies   Allergen Reactions     Metformin      Abdominal pain     Milk [Lac Bovis] Hives     Tylenol [Acetaminophen] Itching     Dulaglutide Rash          I have reviewed the Medications, Allergies, Past Medical and Surgical History, and Social History in the Epic system.    Review of Systems  Pertinent positives and negatives are documented in the HPI. All other systems reviewed and are negative.    Physical Exam   BP: (!) 123/100  Pulse: 92  Temp: 97.3  F (36.3  C)  Resp: 18  Height: 162.6 cm (5' 4\")  Weight: 72.6 kg (160 lb)  SpO2: 99 %      Physical Exam  Vitals signs reviewed.   Constitutional:       General: He is not in acute distress.    "  Appearance: He is well-developed.   HENT:      Head: Normocephalic and atraumatic.      Nose: Nose normal.      Mouth/Throat:      Mouth: Mucous membranes are moist.      Pharynx: No oropharyngeal exudate or posterior oropharyngeal erythema.   Eyes:      Extraocular Movements: Extraocular movements intact.      Conjunctiva/sclera: Conjunctivae normal.      Pupils: Pupils are equal, round, and reactive to light.   Neck:      Musculoskeletal: Normal range of motion and neck supple.   Cardiovascular:      Rate and Rhythm: Normal rate and regular rhythm.      Pulses: Normal pulses.      Heart sounds: Normal heart sounds. No murmur.   Pulmonary:      Effort: Pulmonary effort is normal. No respiratory distress.      Breath sounds: Normal breath sounds. No stridor. No wheezing or rales.   Abdominal:      General: Bowel sounds are normal. There is no distension.      Palpations: Abdomen is soft. There is no mass.      Tenderness: There is no right CVA tenderness, left CVA tenderness, guarding or rebound.      Comments: Left lower quadrant tenderness to palpation.  No rebound or guarding.  No right lower quadrant tenderness to palpation.  No right upper quadrant tenderness to palpation.  Negative Mendes sign.   Musculoskeletal: Normal range of motion.         General: No swelling or tenderness.   Skin:     General: Skin is warm and dry.      Capillary Refill: Capillary refill takes less than 2 seconds.      Findings: No rash.   Neurological:      General: No focal deficit present.      Mental Status: He is alert and oriented to person, place, and time.      GCS: GCS eye subscore is 4. GCS verbal subscore is 5. GCS motor subscore is 6.      Cranial Nerves: No cranial nerve deficit.      Sensory: No sensory deficit.      Motor: No weakness or abnormal muscle tone.      Gait: Gait normal.   Psychiatric:         Mood and Affect: Mood normal.         ED Course        Procedures   10:36 AM  The patient was seen and examined by  Dr. Perez in [facility]               Critical Care time:  none         Labs Ordered and Resulted from Time of ED Arrival Up to the Time of Departure from the ED   COMPREHENSIVE METABOLIC PANEL - Abnormal; Notable for the following components:       Result Value    Glucose 336 (*)     Creatinine 0.61 (*)     All other components within normal limits   ROUTINE UA WITH MICROSCOPIC - Abnormal; Notable for the following components:    Glucose Urine >1000 (*)     Blood Urine Trace (*)     Mucous Urine Present (*)     All other components within normal limits   CREATININE POCT - Abnormal; Notable for the following components:    Creatinine 0.6 (*)     All other components within normal limits   GLUCOSE BY METER - Abnormal; Notable for the following components:    Glucose 348 (*)     All other components within normal limits   GLUCOSE BY METER - Abnormal; Notable for the following components:    Glucose 235 (*)     All other components within normal limits   GLUCOSE MONITOR NURSING POCT   CBC WITH PLATELETS DIFFERENTIAL   LIPASE   GLUCOSE MONITOR NURSING POCT   ISTAT CG4 GASES LACTATE AMNA NURSING POCT   ISTAT CREATININE NURSING POCT   NASOGASTRIC TUBE DECOMPRESSION   ISTAT  GASES LACTATE AMNA POCT   CLOSTRIDIUM DIFFICILE TOXIN B   ENTERIC BACTERIA AND VIRUS PANEL BY SUZETTE STOOL       Consults  Surgery: Responded (01/14/20 [id number])    Assessments & Plan (with Medical Decision Making)   On exam, the patient is overall well-appearing and in no acute distress.  His vital signs here are within normal limits and he is afebrile.  He was given a GI cocktail as well as IV Protonix.  IV fluids were started.  With his left lower quadrant tenderness we will obtain a CT scan of the abdomen pelvis with IV contrast for further evaluation.  Differential diagnosis includes but is not limited to bowel obstruction, diverticulitis, colitis, gastritis, ulcer, pancreatitis.  He does not have any signs of peritonitis on exam currently.  His blood  glucose was 348 and he was given 5 units of NovoLog as he had not taken any corrective insulin today other than his basal Lantus this morning.  Lactic acid was within normal limits.  Creatinine is within normal limits.  Urinalysis reveals glucose without ketones and no signs of UTI or hematuria.  CBC reveals a white blood cell count of 6.8 with a hemoglobin of 14.9.  CMP reveals a normal bicarb with a glucose of 338.  Otherwise is unremarkable.  He does not have any signs of DKA.  Glucose did improve to 235 and will be continually monitored. CT of the abdomen and pelvis revealed dilated loop of small bowel in the low mid abdomen without high-grade transition point it with mild associated small bowel wall thickening and enhancement suggestive of infectious/inflammatory enteritis with associated functional partial small bowel obstruction.  Patient states he has had this in the past but that it is not been constant since it occurred.  He states there is been discussion of surgery previously but has not been done.  There was also evidence of esophagitis that was mild as well as duodenitis.  I spoke with the general surgery team due to the evidence of the small bowel obstruction and they evaluated the patient.  They recommended NG tube and medicine admission.  They stated that he has been evaluated in the past and that no surgery team wanted to perform surgery on him until he had further GI evaluation including MR enterography.  Patient was in agreement with the plan for admission.  He was comfortable in my reevaluation.  He was also in agreement with NG tube placement.  I spoke with the hospitalist service who accepted the patient for admission.  Patient was awaiting transfer to the floor in stable condition.  He will be signed out to my colleague pending transfer to his inpatient bed.     I have reviewed the nursing notes.    I have reviewed the findings, diagnosis, plan and need for follow up with the patient.    New  Prescriptions    No medications on file       Final diagnoses:   Partial small bowel obstruction (H)     IMinh, am serving as a trained medical scribe to document services personally performed by Mirian Perez MD, based on the provider's statements to me.      Mirian KELLY MD, was physically present and have reviewed and verified the accuracy of this note documented by Minh Boss.      1/14/2020   [facility], [place], EMERGENCY DEPARTMENT    Mirian Perez MD  01/14/20 [id number]
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
## 2020-01-14 NOTE — ED NOTES
[facility], [place]   ED Nurse to Floor Handoff     Andrew Lockwood is a 52 year old male who speaks English and lives with others,  is homeless  They arrived in the ED by public transportation from home    ED Chief Complaint: Heartburn and Abdominal Pain    ED Dx;   Final diagnoses:   Partial small bowel obstruction (H)         Needed?: No    Allergies:   Allergies   Allergen Reactions     Metformin      Abdominal pain     Milk [Lac Bovis] Hives     Tylenol [Acetaminophen] Itching     Dulaglutide Rash   .  Past Medical Hx:   Past Medical History:   Diagnosis Date     AIDS (H)      Allergic rhinitis due to other allergen     DNS     Chronic abdominal pain      CNS toxoplasmosis (H)      Diabetes type 2, controlled (H)      GERD (gastroesophageal reflux disease)      HIV (human immunodeficiency virus infection) (H)      Periungual wart      Sleep apnea     doesn't use CPAP      Baseline Mental status: WDL  Current Mental Status changes: at basesline    Infection present or suspected this encounter: no  Sepsis suspected: No  Isolation type: Enteric     Activity level - Baseline/Home:  Independent  Activity Level - Current:   Independent    Bariatric equipment needed?: No    In the ED these meds were given:   Medications   0.9% sodium chloride BOLUS (0 mLs Intravenous Stopped 1/14/20 1417)     Followed by   sodium chloride 0.9% infusion (1,000 mLs Intravenous New Bag 1/14/20 1424)   lidocaine (XYLOCAINE) 2 % 15 mL, alum & mag hydroxide-simethicone (MYLANTA ES/MAALOX  ES) 15 mL GI Cocktail (30 mLs Oral Given 1/14/20 1131)   sodium chloride (PF) 0.9% PF flush 75 mL (75 mLs Intravenous Given 1/14/20 1146)   iopamidol (ISOVUE-370) solution 99 mL (99 mLs Intravenous Given 1/14/20 1146)   insulin aspart (NovoLOG) inj (RAPID ACTING) (5 Units Subcutaneous Given 1/14/20 1350)   pantoprazole (PROTONIX) 40 mg IV push injection (40 mg Intravenous Given 1/14/20 1425)       Drips running?   No    Home pump  No    Current LDAs  Peripheral IV 01/14/20 Right Lower forearm (Active)   Site Assessment [facility] 1/14/2020 11:19 AM   Number of days: 0       Labs results:   Labs Ordered and Resulted from Time of ED Arrival Up to the Time of Departure from the ED   COMPREHENSIVE METABOLIC PANEL - Abnormal; Notable for the following components:       Result Value    Glucose 336 (*)     Creatinine 0.61 (*)     All other components within normal limits   ROUTINE UA WITH MICROSCOPIC - Abnormal; Notable for the following components:    Glucose Urine >1000 (*)     Blood Urine Trace (*)     Mucous Urine Present (*)     All other components within normal limits   CREATININE POCT - Abnormal; Notable for the following components:    Creatinine 0.6 (*)     All other components within normal limits   GLUCOSE BY METER - Abnormal; Notable for the following components:    Glucose 348 (*)     All other components within normal limits   GLUCOSE BY METER - Abnormal; Notable for the following components:    Glucose 235 (*)     All other components within normal limits   GLUCOSE MONITOR NURSING POCT   CBC WITH PLATELETS DIFFERENTIAL   LIPASE   GLUCOSE MONITOR NURSING POCT   ISTAT CG4 GASES LACTATE AMNA NURSING POCT   ISTAT CREATININE NURSING POCT   NASOGASTRIC TUBE DECOMPRESSION   ISTAT  GASES LACTATE AMNA POCT   CLOSTRIDIUM DIFFICILE TOXIN B   ENTERIC BACTERIA AND VIRUS PANEL BY SUZETTE STOOL       Imaging Studies:   Recent Results (from the past 24 hour(s))   CT Abdomen Pelvis w Contrast    Narrative    Exam: CT abdomen and pelvis with contrast    Comparison: 7/13/2019, 10/5/2018    History: History of HIV, tenderness and left lower quadrant, eval for  diverticulitis, obstruction, ulcer, perforation, colitis, etc.    Technique: CT of the abdomen and pelvis was obtained with intravenous  contrast. Coronal and sagittal reconstructions were obtained and  reviewed.    Contrast dose: iopamidol (ISOVUE-370) solution 99 mL       Findings:    Abdomen/pelvis: Gastric distention, similar to prior. Mild  hyperenhancement and wall thickening involving the pylorus/duodenal  bulb. There is a dilated loop of bowel in the mid abdomen which  measures 4.3 cm with associated air-fluid levels. Distal to this,  there relatively gradual caliber change associated with mild bowel  wall thickening and enhancement. Similar findings were present on  10/5/2018 CT. The remainder of the small bowel is relatively  decompressed. Normal caliber of the descending colon, with relative  decompression of the remainder of the colon. Diverticulosis without CT  evidence of acute diverticulitis.    The liver is hypoattenuating relative to the spleen, suggestive of  hepatic steatosis. No suspicious hepatic lesions. No intra or  extrahepatic biliary ductal dilatation. Normal gallbladder, pancreas,  spleen, adrenal glands, and kidneys. No obstructing urinary tract  stone. The bladder is partially distended and unremarkable. Mild  prostatomegaly. Postoperative changes of penile prosthesis with fluid  reservoir anterior to the bladder.    Normal caliber of the infrarenal aorta. The portal vein and SMV are  patent. No pathologically enlarged abdominal or pelvic lymph nodes. No  free air or free fluid in the abdomen or pelvis.    Lower chest: No pleural effusion or pneumothorax. Scattered nodular  opacities in the lung bases with mild lower lobe bronchial wall  thickening. Mild circumferential distal esophageal wall thickening.    Bones: No acute or suspicious appearing bony abnormalities. Small  fat-containing umbilical hernia.      Impression    Impression:   1. Dilated loop of small bowel in the low midabdomen without  high-grade transition point and with mild associated small bowel wall  thickening and enhancement. Similar findings were present on 10/5/2018  CT and they are again suggestive of infectious/inflammatory enteritis  with associated functional partial small  "bowel obstruction.  2. Hyperenhancement and wall thickening involving the pylorus/duodenal  bulb, suggestive of duodenitis.  3. Mild circumferential distal esophageal wall thickening, compatible  with esophagitis.  4. Scattered nodular opacities and bronchial wall thickening in the  lung bases, may represent infection, including atypical fungal  etiologies given patient's immunocompromised status.  5. Diverticulosis without CT evidence of acute diverticulitis.    I have personally reviewed the examination and initial interpretation  and I agree with the findings.    JOSE VITAL, DO       Recent vital signs:   BP (!) 142/92   Pulse 82   Temp 97.3  F (36.3  C) (Oral)   Resp 18   Ht 1.626 m (5' 4\")   Wt 72.6 kg (160 lb)   SpO2 100%   BMI 27.46 kg/m      Sheyla Coma Scale Score: 15 (01/14/20 1343)       Cardiac Rhythm: Other  Pt needs tele? No  Skin/wound Issues: None    Code Status: See Epic    Pain control: fair    Nausea control: fair    Abnormal labs/tests/findings requiring intervention:     Family present during ED course? No   Family Comments/Social Situation comments:     Tasks needing completion: None    Cornelius Lerner, RN  [phone] [facility]      "

## 2020-01-14 NOTE — PROGRESS NOTES
Emergency Social Work Services Note    Date of  Intervention: 01/14/20  Last Emergency Department Visit:  1/13/2020 (Chief Complaint: Snoring)  Care Plan:  No   Collaborated with:  Pt and left msg for counselor at [facility] ([place] [phone])    Data:  ED SW paged per pt request. Reviewed chart and met w/ pt. Pt with hx of HIV/AIDS and type II diabetes Pt being admitted for SBO. Pt requesting that writer update [facility]. Pt reports he has been at the [facility] center for 10 days for meth use. Pt reports he will be in the program for 6 weeks and then transition to a sober house.     Pt has a long hx of homelessness and is hopeful that avenue to obtaining stable housing is through completing CD treatment and maintaining sobriety. Pt reports that he has not had problems obtaining his medications or getting to his clinic appts at [facility]    Intervention:  Discharge Planning    Assessment:  Writer left  updating treatment facility of pt's admission. Pt anticipates to be able to return to treatment center.     Plan:    Anticipated Disposition:  Facility:  Return to [facility]    Barriers to d/c plan:  Anticipate no barriers.     Follow Up:  SW to continue to follow and coordinate discharge back to treatment [place].

## 2020-01-14 NOTE — ED TRIAGE NOTES
Pt arrived in triage with concerns of heart burn/acid reflux and abdominal pain for the past hour.

## 2020-01-14 NOTE — CONSULTS
SURGERY CONSULT  Andrew Lockwood MRN# [id number]   YOB: 1967 Age: 52 year old     Date of Admission: 01/14/20    REASON FOR CONSULTATION: abdominal pain, nausea/vomiting.     HPI: Andrew Lockwood is a 52 year old year old male with complex medical and social history including chronic abdominal pain, HIV, DANISHA, GERD, DM II (on insulin), and recurrent small bowel obstruction as well as homelessness and polysubstance abuse. Patient's complex abdominal surgical history includes negative diagnostic laparoscopy 2016, laparoscopic appendectomy 1/2018 with subsequent abscess treated with percutaneous drain, and laparoscopic ANASTASIIA for SBO 4/2018. Workup for Crohn's disease with EGD and colonoscopy have been negative. The patient has followed up with Dr. Lundberg in colorectal surgery, and his case has been discussed at multidisciplinary GI luminal care conference. It has been recommended that he follow up with MRE vs capsule study prior to consideration of surgical intervention. He is scheduled for MRE later this month.    Today, he complains of worsening left sided abdominal pain for the past 24 hours. He complains of nausea, no vomiting. Last passed flatus and had a BM early this morning. Workup significant for WBC 6.8, Lipase 184, lactate 1.6, CT with dilated small bowel in the low/mid abdomen with associated small bowel thickening.     REVIEW OF SYSTEMS: The remainder of the complete ROS was negative unless noted in the HPI.    PAST MEDICAL HISTORY:   Past Medical History:   Diagnosis Date     AIDS (H)      Allergic rhinitis due to other allergen     DNS     Chronic abdominal pain      CNS toxoplasmosis (H)      Diabetes type 2, controlled (H)      GERD (gastroesophageal reflux disease)      HIV (human immunodeficiency virus infection) (H)      Periungual wart      Sleep apnea     doesn't use CPAP       PAST SURGICAL HISTORY:   Past Surgical History:   Procedure Laterality Date     C NONSPECIFIC PROCEDURE       right forearm fracture     COLONOSCOPY Left 1/22/2016    Procedure: COMBINED COLONOSCOPY, SINGLE OR MULTIPLE BIOPSY/POLYPECTOMY BY BIOPSY;  Surgeon: Clark Saini MD;  Location: [facility]     HC EXPLORE UNDESC TESTIS,INGUIN/SCROTAL       LAPAROSCOPIC APPENDECTOMY N/A 1/31/2018    Procedure: LAPAROSCOPIC APPENDECTOMY;  LAPAROSCOPIC APPENDECTOMY;  Surgeon: Dawn Holt MD;  Location: [facility]     LAPAROSCOPY DIAGNOSTIC (GENERAL) N/A 7/26/2016    Procedure: LAPAROSCOPY DIAGNOSTIC (GENERAL);  Surgeon: Susannah Arriaga MD;  Location: [facility]     LAPAROSCOPY DIAGNOSTIC (GENERAL) N/A 4/16/2018    Procedure: LAPAROSCOPY DIAGNOSTIC (GENERAL);  Diagnostic laparoscopy and lysis of adhesions;  Surgeon: Prince Dowling MD;  Location: [facility]     OPTICAL TRACKING SYSTEM CRANIOTOMY, EXCISE TUMOR, COMBINED Left 4/10/2015    Procedure: COMBINED OPTICAL TRACKING SYSTEM CRANIOTOMY, EXCISE TUMOR;  Surgeon: Mirlande Colmenares MD;  Location: [facility]     REPAIR GAMEKEEPER'S THUMB Right 12/2/2016    Procedure: REPAIR LIGAMENT ULNAR COLLATERAL THUMB (GAMEKEEPER'S);  Surgeon: Evin Zamorano MD;  Location: [facility]       ALLERGIES:    Allergies   Allergen Reactions     Metformin      Abdominal pain     Milk [Lac Bovis] Hives     Tylenol [Acetaminophen] Itching     Dulaglutide Rash       HOME MEDICATIONS: No current facility-administered medications on file prior to encounter.   amoxicillin-clavulanate (AUGMENTIN) 875-125 MG tablet, Take 1 tablet by mouth 2 times daily  azithromycin (ZITHROMAX) 250 MG tablet, Two tablets first day, then one tablet daily for four days.  bictegravir-emtricitabine-tenofovir (BIKTARVY) -25 MG per tablet, Take 1 tablet by mouth daily  blood glucose (NO BRAND SPECIFIED) lancets standard, Use to test blood sugar four times daily or as directed. (Patient not taking: Reported on 1/7/2020)  blood glucose (NO BRAND SPECIFIED) test strip, 1 strip by In Vitro route 4 times daily (before  meals and nightly) Use to test blood sugars 4 times daily or as directed (Patient not taking: Reported on 2020)  blood glucose monitoring (NO BRAND SPECIFIED) meter device kit, Use to test blood sugar 4 times daily or as directed. (Patient not taking: Reported on 2020)  cetirizine (ZYRTEC) 10 MG tablet, Take 1 tablet (10 mg) by mouth daily  chlorhexidine (HIBICLENS) 4 % liquid, Apply small amount to wash cloth and use to cleanse buttocks area during bathing 1-2 times per week (Patient not taking: Reported on 2020)  famotidine (PEPCID) 20 MG tablet, Take 1 tablet (20 mg) by mouth 2 times daily  [] ibuprofen (ADVIL/MOTRIN) 200 MG tablet, Take 3 tablets (600 mg) by mouth 3 times daily for 5 days  insulin glargine (LANTUS PEN) 100 UNIT/ML pen, Inject 30 Units Subcutaneous 2 times daily  insulin pen needle (B-D U/F) 31G X 8 MM miscellaneous, Use 1 pen needles daily or as directed.  insulin pen needle (BD PEN NEEDLE SCOTT 2ND GEN) 32G X 4 MM miscellaneous, Use 3-4 daily.  [] methocarbamol (ROBAXIN) 500 MG tablet, Take 2 tablets (1,000 mg) by mouth 3 times daily for 5 days  NOVOLOG FLEXPEN 100 UNIT/ML soln, Inject 8 units prior to breakfast, lunch and dinner daily.        SOCIAL HISTORY:   Social History     Tobacco Use     Smoking status: Current Some Day Smoker     Packs/day: 0.25     Types: Cigarettes     Last attempt to quit: 10/28/2016     Years since quitting: 3.2     Smokeless tobacco: Former User   Substance Use Topics     Alcohol use: No     Alcohol/week: 0.0 standard drinks     Comment: Last etoh in      Drug use: Not Currently     Types: Marijuana       FAMILY HISTORY:   Family History   Problem Relation Age of Onset     Diabetes Brother      Diabetes Father      Alzheimer Disease Father      Unknown/Adopted Mother      Diabetes Paternal Grandfather      Cancer No family hx of         no skin cancer     Skin Cancer No family hx of         no famiy hx of skin cancer     Glaucoma No  "family hx of      Macular Degeneration No family hx of        PHYSICAL EXAMINATION:  BP (!) 123/100   Pulse 92   Temp 97.3  F (36.3  C) (Oral)   Resp 18   Ht 1.626 m (5' 4\")   Wt 72.6 kg (160 lb)   SpO2 99%   BMI 27.46 kg/m       General: NAD, awake and alert, sitting up in chair   CV: Non-cyanotic   Pulm: No increased work of breathing on room air   Abd: soft, minimally distended, quite tender to palpation over the left abdomen. Non-peritonitic.   : No granados   Extremities: WWP without edema  Neuro: No focal deficits noted, patient moves all extremities spontaneously    LABS:  Recent Labs   Lab 01/14/20  1100   WBC 6.8   RBC 4.93   HGB 14.9   HCT 43.5   MCV 88   MCH 30.2   MCHC 34.3   RDW 13.1          Recent Labs   Lab 01/14/20  1100      POTASSIUM 3.7   CHLORIDE 101   CO2 27   BUN 12   CR 0.61*   *   LINDA 9.7       Recent Labs   Lab 01/14/20  1100   AST 9   ALT 25   ALKPHOS 140   BILITOTAL 0.6   ALBUMIN 3.7       IMAGING: reviewed   Exam: CT abdomen and pelvis with contrast 01/14/20   Impression:   1. Dilated loop of small bowel in the low midabdomen without  high-grade transition point and with mild associated small bowel wall  thickening and enhancement. Similar findings were present on 10/5/2018  CT and they are again suggestive of infectious/inflammatory enteritis  with associated functional partial small bowel obstruction.  2. Hyperenhancement and wall thickening involving the pylorus/duodenal  bulb, suggestive of duodenitis.  3. Mild circumferential distal esophageal wall thickening, compatible  with esophagitis.  4. Scattered nodular opacities and bronchial wall thickening in the  lung bases, may represent infection, including atypical fungal  etiologies given patient's immunocompromised status.  5. Diverticulosis without CT evidence of acute diverticulitis.    A/P: Andrew Lockwood is a 52 year old male with complex medical and social history including chronic abdominal pain, HIV, " DANISHA, GERD, DM II (on insulin), and recurrent small bowel obstruction as well as homelessness and polysubstance abuse. He presents with obstructive symptoms.     - Recommend admission to a medicine service   - Recommend NPO, IVF resuscitation   - Recommend placement of NGT for gastric decompression   - Replacement of electrolytes such that K>4, Mg>2  - No indication for emergent operative intervention     Discussed with chief      Fang Meza   Surgery Resident

## 2020-01-14 NOTE — LETTER
Health Information Management Services               Recipient:  Armando OLIVER        Sender:  RADHA Reeves, SW    M [facility]  Phone [phone]          Date: January 17, 2020  Patient Name:  Andrew Lockwood  Routing Message:    Discharge orders        The documents accompanying this notice contain confidential information belonging to the sender.  This information is intended only for the use of the individual or entity named above.  The authorized recipient of this information is prohibited from disclosing this information to any other party and is required to destroy the information after its stated need has been fulfilled, unless otherwise required by state law.      If you are not the intended recipient, you are hereby notified that any disclosure, copy, distribution or action taken in reliance on the contents of these documents is strictly prohibited.  If you have received this document in error, please return it by fax to [phone] with a note on the cover sheet explaining why you are returning it (e.g. not your patient, not your provider, etc.).  If you need further assistance, please call [facility] Centralized Transcription at [phone].  Documents may also be returned by mail to [employer], , [facility] [address] [place], [place] [address].

## 2020-01-15 LAB
ANION GAP SERPL CALCULATED.3IONS-SCNC: 5 MMOL/L (ref 3–14)
BUN SERPL-MCNC: 7 MG/DL (ref 7–30)
C DIFF TOX B STL QL: NEGATIVE
CALCIUM SERPL-MCNC: 8.2 MG/DL (ref 8.5–10.1)
CHLORIDE SERPL-SCNC: 110 MMOL/L (ref 94–109)
CO2 SERPL-SCNC: 26 MMOL/L (ref 20–32)
CREAT SERPL-MCNC: 0.67 MG/DL (ref 0.66–1.25)
ERYTHROCYTE [DISTWIDTH] IN BLOOD BY AUTOMATED COUNT: 13.2 % (ref 10–15)
GFR SERPL CREATININE-BSD FRML MDRD: >90 ML/MIN/{1.73_M2}
GLUCOSE BLDC GLUCOMTR-MCNC: 110 MG/DL (ref 70–99)
GLUCOSE BLDC GLUCOMTR-MCNC: 155 MG/DL (ref 70–99)
GLUCOSE BLDC GLUCOMTR-MCNC: 173 MG/DL (ref 70–99)
GLUCOSE BLDC GLUCOMTR-MCNC: 176 MG/DL (ref 70–99)
GLUCOSE BLDC GLUCOMTR-MCNC: 244 MG/DL (ref 70–99)
GLUCOSE BLDC GLUCOMTR-MCNC: 71 MG/DL (ref 70–99)
GLUCOSE BLDC GLUCOMTR-MCNC: 95 MG/DL (ref 70–99)
GLUCOSE SERPL-MCNC: 98 MG/DL (ref 70–99)
HCT VFR BLD AUTO: 38.6 % (ref 40–53)
HGB BLD-MCNC: 13.2 G/DL (ref 13.3–17.7)
MAGNESIUM SERPL-MCNC: 1.9 MG/DL (ref 1.6–2.3)
MCH RBC QN AUTO: 30.6 PG (ref 26.5–33)
MCHC RBC AUTO-ENTMCNC: 34.2 G/DL (ref 31.5–36.5)
MCV RBC AUTO: 90 FL (ref 78–100)
PLATELET # BLD AUTO: 296 10E9/L (ref 150–450)
POTASSIUM SERPL-SCNC: 3.1 MMOL/L (ref 3.4–5.3)
POTASSIUM SERPL-SCNC: 4.4 MMOL/L (ref 3.4–5.3)
RBC # BLD AUTO: 4.31 10E12/L (ref 4.4–5.9)
SODIUM SERPL-SCNC: 140 MMOL/L (ref 133–144)
SPECIMEN SOURCE: NORMAL
WBC # BLD AUTO: 5.7 10E9/L (ref 4–11)

## 2020-01-15 PROCEDURE — 83735 ASSAY OF MAGNESIUM: CPT | Performed by: INTERNAL MEDICINE

## 2020-01-15 PROCEDURE — 85027 COMPLETE CBC AUTOMATED: CPT | Performed by: INTERNAL MEDICINE

## 2020-01-15 PROCEDURE — 25800030 ZZH RX IP 258 OP 636: Performed by: EMERGENCY MEDICINE

## 2020-01-15 PROCEDURE — 12000001 ZZH R&B MED SURG/OB UMMC

## 2020-01-15 PROCEDURE — 25000131 ZZH RX MED GY IP 250 OP 636 PS 637: Performed by: STUDENT IN AN ORGANIZED HEALTH CARE EDUCATION/TRAINING PROGRAM

## 2020-01-15 PROCEDURE — 25000132 ZZH RX MED GY IP 250 OP 250 PS 637: Performed by: STUDENT IN AN ORGANIZED HEALTH CARE EDUCATION/TRAINING PROGRAM

## 2020-01-15 PROCEDURE — 87493 C DIFF AMPLIFIED PROBE: CPT | Performed by: STUDENT IN AN ORGANIZED HEALTH CARE EDUCATION/TRAINING PROGRAM

## 2020-01-15 PROCEDURE — 80048 BASIC METABOLIC PNL TOTAL CA: CPT | Performed by: INTERNAL MEDICINE

## 2020-01-15 PROCEDURE — 40000141 ZZH STATISTIC PERIPHERAL IV START W/O US GUIDANCE

## 2020-01-15 PROCEDURE — 99233 SBSQ HOSP IP/OBS HIGH 50: CPT | Mod: GC | Performed by: INTERNAL MEDICINE

## 2020-01-15 PROCEDURE — 25000131 ZZH RX MED GY IP 250 OP 636 PS 637: Performed by: NURSE PRACTITIONER

## 2020-01-15 PROCEDURE — 25000128 H RX IP 250 OP 636: Performed by: STUDENT IN AN ORGANIZED HEALTH CARE EDUCATION/TRAINING PROGRAM

## 2020-01-15 PROCEDURE — 00000146 ZZHCL STATISTIC GLUCOSE BY METER IP

## 2020-01-15 PROCEDURE — 25800030 ZZH RX IP 258 OP 636: Performed by: STUDENT IN AN ORGANIZED HEALTH CARE EDUCATION/TRAINING PROGRAM

## 2020-01-15 PROCEDURE — 36415 COLL VENOUS BLD VENIPUNCTURE: CPT | Performed by: INTERNAL MEDICINE

## 2020-01-15 PROCEDURE — 84132 ASSAY OF SERUM POTASSIUM: CPT | Performed by: INTERNAL MEDICINE

## 2020-01-15 PROCEDURE — 25000125 ZZHC RX 250: Performed by: STUDENT IN AN ORGANIZED HEALTH CARE EDUCATION/TRAINING PROGRAM

## 2020-01-15 RX ORDER — FAMOTIDINE 10 MG
10 TABLET ORAL 2 TIMES DAILY PRN
Status: DISCONTINUED | OUTPATIENT
Start: 2020-01-15 | End: 2020-01-17 | Stop reason: HOSPADM

## 2020-01-15 RX ADMIN — FAMOTIDINE 10 MG: 10 TABLET, FILM COATED ORAL at 22:26

## 2020-01-15 RX ADMIN — BICTEGRAVIR SODIUM, EMTRICITABINE, AND TENOFOVIR ALAFENAMIDE FUMARATE 1 TABLET: 50; 200; 25 TABLET ORAL at 08:56

## 2020-01-15 RX ADMIN — SODIUM CHLORIDE, POTASSIUM CHLORIDE, SODIUM LACTATE AND CALCIUM CHLORIDE 1000 ML: 600; 310; 30; 20 INJECTION, SOLUTION INTRAVENOUS at 18:23

## 2020-01-15 RX ADMIN — Medication 10 MEQ: at 08:53

## 2020-01-15 RX ADMIN — ACETAMINOPHEN 650 MG: 325 TABLET, FILM COATED ORAL at 23:50

## 2020-01-15 RX ADMIN — Medication 2 G: at 16:37

## 2020-01-15 RX ADMIN — Medication 10 MEQ: at 07:37

## 2020-01-15 RX ADMIN — ENOXAPARIN SODIUM 40 MG: 40 INJECTION SUBCUTANEOUS at 19:41

## 2020-01-15 RX ADMIN — SODIUM CHLORIDE 1000 ML: 900 INJECTION INTRAVENOUS at 00:54

## 2020-01-15 RX ADMIN — Medication 10 MEQ: at 06:32

## 2020-01-15 RX ADMIN — SODIUM CHLORIDE 1000 ML: 900 INJECTION INTRAVENOUS at 20:25

## 2020-01-15 RX ADMIN — INSULIN ASPART 1 UNITS: 100 INJECTION, SOLUTION INTRAVENOUS; SUBCUTANEOUS at 20:03

## 2020-01-15 RX ADMIN — INSULIN GLARGINE 20 UNITS: 100 INJECTION, SOLUTION SUBCUTANEOUS at 08:56

## 2020-01-15 ASSESSMENT — ACTIVITIES OF DAILY LIVING (ADL)
ADLS_ACUITY_SCORE: 12

## 2020-01-15 ASSESSMENT — MIFFLIN-ST. JEOR: SCORE: 1506.69

## 2020-01-15 NOTE — PLAN OF CARE
Patient NG tubed removed at 1400. Tolerating liquids moved to regular diet. Blood sugar dropped when he was npo. Now in the low 100's, waiting for another iv pump old one shut off when patient was outside. Still needs his mg replaced and bolus to be given. Patient had a very large bowel movement that was soft.
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
## 2020-01-15 NOTE — PLAN OF CARE
ASSUMED CARES: [id number].  STATUS: Pt admitted for recurrent SBO. Hx includes HIV/AIDS, DM2, GERD and HTN.   NEURO: A/o x 4  VS: VSS on RA.   ACTIVITY: up IND in room  PAIN: Denies  CARDIAC: WNL.   RESP: WNL  GI/: Voids spontaneously. No BM during night- Stool sample needed- pt aware and hat in the toilet.   DIET: NPO.   SKIN: Intact  LDA'S: L PIV infusing  ml/hr. NG hooked to LIS. Red colored OP d/t pt eating multiple red popsicles.   LABS: Stool spec needed. Most recent K+ 3.7- requiring replacement per protocol, however result from yesterday AM- Monitor AM K+ and Mag to determine if replacement needed.  &        .   CHANGES THIS SHIFT: MIVF hooked up. Pt slept most of shift. Pt requested to be disconnected from NG to go on a walk outside. Pt states he is not a smoker. Pt was out of room for about 10 minutes. Pt tolerated 4 purple popsicles during night.   POC: Cont with POC. Pt is homeless, but currently residing at rehab center for meth abuse. Discharge plan once SBO resolved. Call light within reach. Will continue to monitor.

## 2020-01-15 NOTE — PHARMACY-ADMISSION MEDICATION HISTORY
Admission medication history interview status for the 1/14/2020 admission is complete. See Epic admission navigator for allergy information, pharmacy, prior to admission medications and immunization status.     Medication history interview sources: Patient, Chart Review     Changes made to PTA medication list (reason)  Added: Ibuprofen 200 mg tablets - Take 3 tablets by mouth every 4 hours as needed.   azithromycin - therapy completed 1/13  Deleted: Chlorhexidine 4% Liquid   Changed: none     Additional medication history information (including reliability of information, actions taken by pharmacist):    Patient was a good historian and was aware of his medications     Finished azithromycin yesterday (1/13/2020)     Augmentin planned for 2 weeks, likely through 1/21 (started 1/7)    Prior to Admission medications    Medication Sig Last Dose Taking? Auth Provider   amoxicillin-clavulanate (AUGMENTIN) 875-125 MG tablet Take 1 tablet by mouth 2 times daily 1/13/2020 at Unknown time Yes Kate Meeks APRN CNP   azithromycin (ZITHROMAX) 250 MG tablet Two tablets first day, then one tablet daily for four days. 1/13/2020 at Unknown time Yes Kate Meeks APRN CNP   bictegravir-emtricitabine-tenofovir (BIKTARVY) -25 MG per tablet Take 1 tablet by mouth daily 1/13/2020 at Unknown time Yes Ayala Prieto MD   famotidine (PEPCID) 20 MG tablet Take 1 tablet (20 mg) by mouth 2 times daily 1/14/2020 at am Yes Kate Meeks APRN CNP   ibuprofen (ADVIL/MOTRIN) 200 MG tablet Take 600 mg by mouth every 4 hours as needed for mild pain 1/13/2020 at pm Yes Unknown, Entered By History   insulin glargine (LANTUS PEN) 100 UNIT/ML pen Inject 30 Units Subcutaneous 2 times daily 1/14/2020 at am Yes Ceci Bran APRN CNP   NOVOLOG FLEXPEN 100 UNIT/ML soln Inject 8 units prior to breakfast, lunch and dinner daily. 1/14/2020 at 1200 Yes Ceci Bran APRN CNP   blood glucose (NO BRAND SPECIFIED)  lancets standard Use to test blood sugar four times daily or as directed.   Caitie Lamb MD   blood glucose (NO BRAND SPECIFIED) test strip 1 strip by In Vitro route 4 times daily (before meals and nightly) Use to test blood sugars 4 times daily or as directed   Caitie Lamb MD   blood glucose monitoring (NO BRAND SPECIFIED) meter device kit Use to test blood sugar 4 times daily or as directed.   Caitie Lamb MD   cetirizine (ZYRTEC) 10 MG tablet Take 1 tablet (10 mg) by mouth daily More than a month at Unknown time  Venu Wolff MD   insulin pen needle (B-D U/F) 31G X 8 MM miscellaneous Use 1 pen needles daily or as directed.   Ashley Connelly PA-C   insulin pen needle (BD PEN NEEDLE SCOTT 2ND GEN) 32G X 4 MM miscellaneous Use 3-4 daily.   Ashley Connelly PA-C         Medication history completed by: Benita Lou 2nd Year Pharmacy Student

## 2020-01-15 NOTE — PROGRESS NOTES
"Surgery Progress Note     S: NGT placed. 700 ml of gastric output out overnight. Nausea and abdominal pain have significantly improved. Passing flatus this morning. No BM. Voided once overnight.     O:   /73 (BP Location: Right arm)   Pulse 82   Temp 97.3  F (36.3  C) (Oral)   Resp 18   Ht 1.626 m (5' 4\")   Wt 73.2 kg (161 lb 4.8 oz)   SpO2 100%   BMI 27.69 kg/m      GEN: NAD  CV: Non-cyanotic   RESP: Nonlabored breathing   ABD: soft, non distended, some tenderness to deep palpation over the left abdomen. No guarding or rebound.   PSYCH: cooperative     Labs reviewed   K 3.1   Mg 1.9    Imaging: AXR with gastric tube in appropriate position     A/P: 52 year old male with complex medical and social history including chronic abdominal pain, HIV, DANISHA, GERD, DM II (on insulin), and recurrent small bowel obstruction as well as homelessness and polysubstance abuse. Presented 1/14 with SBO. Now with improved pain and anterograde bowel function.        Okay to remove NGT     Okay for small amount of ice chips, can consider advancing later this afternoon if looking great.     Replacement of electrolytes such that K>4, Mg>2    No indication for emergent operative intervention     Fang Meza   Surgery Resident         "

## 2020-01-15 NOTE — PROVIDER NOTIFICATION
"Paged [phone]    \"Pt tolerating clear diet well- advanced to regular per previous diet order, but order now changed to clear liquid. Does he need to be on clears?\"  "

## 2020-01-15 NOTE — PROGRESS NOTES
"SPIRITUAL HEALTH SERVICES  SPIRITUAL ASSESSMENT Progress Note  [facility] ([place] 5B     PRIMARY FOCUS:     Goals of care    Emotional/spiritual/Anabaptist distress    Support for coping    REFERRAL SOURCE: Self-initiated visit    ILLNESS CIRCUMSTANCES:   Reviewed documentation. Reflective conversation shared with pt Andrew which integrated elements of illness and family narratives.     Context of Serious Illness/Symptom(s) - Diabetic shock    Resources for Support - Sister/Mosque/Hinduism    DISTRESS:     Emotional/Spiritual/Existential Distress - Andrew expressed concern over his glucose number being too high because of the \"pasta is all they serve at the shelter\" which after eating this, caused him to fall \"unconsious\"     Hoahaoism Distress - no apparent distress    Social/Cultural/Economic Distress - Andrew had previously stated that he did not have a home, but living arrangements have been mad for him upon discharge. He stated, \"I'm 53 now so I can get this place God Willing\". He also stated that, \"My brother doesn't talk to me, I don't know why\" and he show in his body language some discomfort with that thought.    SPIRITUAL/Christian COPING:     Christian/Jolene - Mosque/Taoist    Spiritual Practice(s) - Prayer/attending local Amish.     Emotional/Relational/Existential Connections - Andrew does not have a spouse or partner but has a special bond with his sister who is 11 months away from him in age.     GOALS OF CARE:    Goals of Care - Andrew wishes to heal and stated that, \"Allah has caused this to me for a reason, and He will cure me from it. I know that\".     Meaning/Sense-Making - Andrew shared that, \"If anything were to happen to me now, I know I'll be ok (crying). Because my sister is a Taoist now too. And she was the one who hated me for it before\".     PLAN: I will follow up with Andrew during his stay. [facility] is available to Andrew per request.     Clari Aldrich  Chaplain Resident  Pager [phone]    "

## 2020-01-15 NOTE — PLAN OF CARE
Patient admitted from ED around 2200. Patient remains w/ belongings.  Status: Patient admitted for partial SBO.   VS: VSS on RA.  Neuros: A&Ox4.  GI/: NPO w/ ice chips. MARLON Polk MD paged if there needs to be orders for LIS. Denies nausea. BM today. Flatus+. Voiding spontaneously.   IV: R PIV.  Activity: Up independently.  Pain: Denies.  Respiratory/Trach: No issues.  Skin: Intact.  Labs: Need C.diff stool sample, pt aware.  Plan of Care: Continue to monitor and follow POC.

## 2020-01-15 NOTE — CONSULTS
"Social Work Services Progress Note    Hospital Day: 2  Collaborated with:  Chart review, primary team    Data:  Pt is a 52-year-old male admitted to [facility] 1/14/20  with 24 hours of left-sided abdominal pain found to have partial SBP on CT A/P.     Intervention:  SW consult received for pt having \"history of homelessness, substance abuse, uncontrolled DM, HIV.\" Please see ED SW note 1/14/20- pt currently in CD treatment and planning to transition to sober housing after completion of treatment. Pt states he has not had problems obtaining medications or getting to medical appointments. Will follow for return to CD treatment at discharge.    Assessment:  Pt currently in CD treatment    Plan:    Anticipated Disposition:  CD treatment    Barriers to d/c plan:  Medical stability    Follow Up:  SW to follow for discharge planning    RADHA Reeves, LGSW    [facility]  Pager [phone]  Phone [phone]        "

## 2020-01-16 LAB
ANION GAP SERPL CALCULATED.3IONS-SCNC: 6 MMOL/L (ref 3–14)
BUN SERPL-MCNC: 4 MG/DL (ref 7–30)
CALCIUM SERPL-MCNC: 8 MG/DL (ref 8.5–10.1)
CHLORIDE SERPL-SCNC: 114 MMOL/L (ref 94–109)
CO2 SERPL-SCNC: 23 MMOL/L (ref 20–32)
CREAT SERPL-MCNC: 0.6 MG/DL (ref 0.66–1.25)
ERYTHROCYTE [DISTWIDTH] IN BLOOD BY AUTOMATED COUNT: 12.9 % (ref 10–15)
GFR SERPL CREATININE-BSD FRML MDRD: >90 ML/MIN/{1.73_M2}
GLUCOSE BLDC GLUCOMTR-MCNC: 113 MG/DL (ref 70–99)
GLUCOSE BLDC GLUCOMTR-MCNC: 118 MG/DL (ref 70–99)
GLUCOSE BLDC GLUCOMTR-MCNC: 157 MG/DL (ref 70–99)
GLUCOSE BLDC GLUCOMTR-MCNC: 161 MG/DL (ref 70–99)
GLUCOSE BLDC GLUCOMTR-MCNC: 262 MG/DL (ref 70–99)
GLUCOSE BLDC GLUCOMTR-MCNC: 338 MG/DL (ref 70–99)
GLUCOSE BLDC GLUCOMTR-MCNC: 60 MG/DL (ref 70–99)
GLUCOSE BLDC GLUCOMTR-MCNC: 80 MG/DL (ref 70–99)
GLUCOSE SERPL-MCNC: 121 MG/DL (ref 70–99)
HCT VFR BLD AUTO: 37.4 % (ref 40–53)
HGB BLD-MCNC: 12.6 G/DL (ref 13.3–17.7)
MAGNESIUM SERPL-MCNC: 2 MG/DL (ref 1.6–2.3)
MCH RBC QN AUTO: 30.4 PG (ref 26.5–33)
MCHC RBC AUTO-ENTMCNC: 33.7 G/DL (ref 31.5–36.5)
MCV RBC AUTO: 90 FL (ref 78–100)
PLATELET # BLD AUTO: 274 10E9/L (ref 150–450)
POTASSIUM SERPL-SCNC: 3.5 MMOL/L (ref 3.4–5.3)
RBC # BLD AUTO: 4.15 10E12/L (ref 4.4–5.9)
SODIUM SERPL-SCNC: 142 MMOL/L (ref 133–144)
WBC # BLD AUTO: 5.4 10E9/L (ref 4–11)

## 2020-01-16 PROCEDURE — 99207 ZZC CDG-CUT & PASTE-POTENTIAL IMPACT ON LEVEL: CPT | Performed by: INTERNAL MEDICINE

## 2020-01-16 PROCEDURE — 25000132 ZZH RX MED GY IP 250 OP 250 PS 637: Performed by: STUDENT IN AN ORGANIZED HEALTH CARE EDUCATION/TRAINING PROGRAM

## 2020-01-16 PROCEDURE — 85027 COMPLETE CBC AUTOMATED: CPT | Performed by: STUDENT IN AN ORGANIZED HEALTH CARE EDUCATION/TRAINING PROGRAM

## 2020-01-16 PROCEDURE — 99233 SBSQ HOSP IP/OBS HIGH 50: CPT | Mod: GC | Performed by: INTERNAL MEDICINE

## 2020-01-16 PROCEDURE — 25000128 H RX IP 250 OP 636: Performed by: STUDENT IN AN ORGANIZED HEALTH CARE EDUCATION/TRAINING PROGRAM

## 2020-01-16 PROCEDURE — 83735 ASSAY OF MAGNESIUM: CPT | Performed by: STUDENT IN AN ORGANIZED HEALTH CARE EDUCATION/TRAINING PROGRAM

## 2020-01-16 PROCEDURE — 25800030 ZZH RX IP 258 OP 636: Performed by: EMERGENCY MEDICINE

## 2020-01-16 PROCEDURE — 25000131 ZZH RX MED GY IP 250 OP 636 PS 637: Performed by: NURSE PRACTITIONER

## 2020-01-16 PROCEDURE — 80048 BASIC METABOLIC PNL TOTAL CA: CPT | Performed by: STUDENT IN AN ORGANIZED HEALTH CARE EDUCATION/TRAINING PROGRAM

## 2020-01-16 PROCEDURE — 36415 COLL VENOUS BLD VENIPUNCTURE: CPT | Performed by: STUDENT IN AN ORGANIZED HEALTH CARE EDUCATION/TRAINING PROGRAM

## 2020-01-16 PROCEDURE — 12000001 ZZH R&B MED SURG/OB UMMC

## 2020-01-16 PROCEDURE — 00000146 ZZHCL STATISTIC GLUCOSE BY METER IP

## 2020-01-16 PROCEDURE — 25000125 ZZHC RX 250: Performed by: STUDENT IN AN ORGANIZED HEALTH CARE EDUCATION/TRAINING PROGRAM

## 2020-01-16 RX ORDER — ONDANSETRON 4 MG/1
4 TABLET, ORALLY DISINTEGRATING ORAL EVERY 6 HOURS PRN
Qty: 12 TABLET | Refills: 0 | Status: SHIPPED | OUTPATIENT
Start: 2020-01-16 | End: 2020-04-27

## 2020-01-16 RX ADMIN — ACETAMINOPHEN 650 MG: 325 TABLET, FILM COATED ORAL at 21:41

## 2020-01-16 RX ADMIN — SODIUM CHLORIDE 1000 ML: 900 INJECTION INTRAVENOUS at 03:30

## 2020-01-16 RX ADMIN — INSULIN ASPART 4 UNITS: 100 INJECTION, SOLUTION INTRAVENOUS; SUBCUTANEOUS at 10:32

## 2020-01-16 RX ADMIN — FAMOTIDINE 10 MG: 10 TABLET, FILM COATED ORAL at 21:47

## 2020-01-16 RX ADMIN — DICLOFENAC 0.5 G: 10 GEL TOPICAL at 10:39

## 2020-01-16 RX ADMIN — DEXTROSE 15 G: 15 GEL ORAL at 15:52

## 2020-01-16 RX ADMIN — INSULIN ASPART 4 UNITS: 100 INJECTION, SOLUTION INTRAVENOUS; SUBCUTANEOUS at 12:56

## 2020-01-16 RX ADMIN — ENOXAPARIN SODIUM 40 MG: 40 INJECTION SUBCUTANEOUS at 19:55

## 2020-01-16 RX ADMIN — DICLOFENAC 0.5 G: 10 GEL TOPICAL at 16:01

## 2020-01-16 RX ADMIN — DICLOFENAC 0.5 G: 10 GEL TOPICAL at 19:55

## 2020-01-16 RX ADMIN — BICTEGRAVIR SODIUM, EMTRICITABINE, AND TENOFOVIR ALAFENAMIDE FUMARATE 1 TABLET: 50; 200; 25 TABLET ORAL at 08:20

## 2020-01-16 RX ADMIN — INSULIN ASPART 1 UNITS: 100 INJECTION, SOLUTION INTRAVENOUS; SUBCUTANEOUS at 12:56

## 2020-01-16 RX ADMIN — INSULIN ASPART 2 UNITS: 100 INJECTION, SOLUTION INTRAVENOUS; SUBCUTANEOUS at 18:04

## 2020-01-16 RX ADMIN — INSULIN ASPART 4 UNITS: 100 INJECTION, SOLUTION INTRAVENOUS; SUBCUTANEOUS at 18:01

## 2020-01-16 RX ADMIN — Medication 2 G: at 08:24

## 2020-01-16 RX ADMIN — POTASSIUM CHLORIDE 20 MEQ: 750 TABLET, EXTENDED RELEASE ORAL at 10:30

## 2020-01-16 RX ADMIN — DICLOFENAC 0.5 G: 10 GEL TOPICAL at 12:38

## 2020-01-16 ASSESSMENT — ACTIVITIES OF DAILY LIVING (ADL)
ADLS_ACUITY_SCORE: 12

## 2020-01-16 ASSESSMENT — MIFFLIN-ST. JEOR: SCORE: 1549.33
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
## 2020-01-16 NOTE — PROVIDER NOTIFICATION
"Paged [phone]    \"Pt refusing to eat- do you want to hold off on novolog? BG was 244.\"    \"Ascom was not working- do you want correction now and at bedtime if BG in range?\"  "

## 2020-01-16 NOTE — PROGRESS NOTES
"Surgery Progress Note     S: NGT removed. Tolerating clears. Passing flatus, multiple BM recorded. Minimal abdominal pain.     O:   /74 (BP Location: Left arm)   Pulse 85   Temp 97.1  F (36.2  C) (Oral)   Resp 18   Ht 1.626 m (5' 4\")   Wt 75.6 kg (166 lb 9.6 oz)   SpO2 100%   BMI 28.60 kg/m      GEN: NAD  CV: Non-cyanotic   RESP: Nonlabored breathing   ABD: soft, non distended, some tenderness to deep palpation over the left abdomen though improved from previous. No guarding or rebound.   PSYCH: cooperative     Labs reviewed   K 3.5   Mg 2.0    Imaging: no new imaging     A/P: 52 year old male with complex medical and social history including chronic abdominal pain, HIV, DANISHA, GERD, DM II (on insulin), and recurrent small bowel obstruction as well as homelessness and polysubstance abuse. Presented 1/14 with SBO. Now with improved pain and anterograde bowel function.        Okay to advance diet as tolerated to low residue diet     Replacement of electrolytes such that K>4, Mg>2    No indication for emergent operative intervention     Plan for surgical follow up in 4 weeks to discuss recurrent small bowel obstructions     Surgery will sign off - please call with questions     Fang Meza   Surgery Resident         "

## 2020-01-16 NOTE — PLAN OF CARE
"ASSUMED CARES: [id number].  STATUS: Pt admitted for recurrent SBO. Hx includes HIV/AIDS, DM2, GERD and HTN.   NEURO: A/o x 4  VS: VSS on RA.   ACTIVITY: Up IND in room  PAIN: Some pain near old Anticoag sites on abdomen- Heat applied and APAP given x 1  CARDIAC: WNL.   RESP: WNL  GI/: Voids spontaneously. No BM during night.   DIET: Clears- Pt would like diet advanced- unsure why diet was changed to clears per pt.   SKIN: Per previous nurse, Primapore placed on pt buttocks for small open area, that pt states was present prior to admission.   LDA'S: L PIV infusing  ml/hr. NG removed on previous shift.   LABS: K+ recheck 4.4. Mg recheck in the AM- replaced on evenings.  at 0200.     .   CHANGES THIS SHIFT: No changes noted during night. Pt slept most of shift. Pt would like to eat \"Real\" food this AM. Pt upset about diet change to clears for \"Unknown\" reason per pt.   POC: Cont with POC. Pt is homeless, but currently residing at rehab center for meth abuse. Discharge plan once SBO resolved. Call light within reach. Will continue to monitor.       "
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
## 2020-01-16 NOTE — PROGRESS NOTES
Social Work Services Progress Note     Hospital Day: 3  Collaborated with:  Primary team Cristina Walker, patient, [facility] (ph [phone])     Data:  Pt is a 52-year-old male admitted to [facility] 1/14/20  with 24 hours of left-sided abdominal pain found to have partial SBP on CT A/P.      Intervention:  Per primary team, pt may be stable for discharge tomorrow. Met with pt. Pt confirms he is treatment at [facility] and would like to return. Pt signed MAGGY for SW to speak with [facility]. Called and spoke with [employer] program- they state to call tomorrow regarding discharge.    Pt states he has been trouble securing housing because he lost his green card and states it is $700 to replace it. Fee waiver assistance is available to replace green card if pt meets financial eligibility requirements. Will provide pt with application prior to dc.      Assessment:  Planning for return to CD treatment tomorrow     Plan:    Anticipated Disposition:  CD treatment    Barriers to d/c plan:  Medical stability    Follow Up:  SW to follow for discharge planning     RADHA Reeves, LGSW    [facility]  Pager [phone]  Phone [phone]

## 2020-01-16 NOTE — PROGRESS NOTES
Received call that patient's insurance covers One Touch Verio brand monitor and supplies.  Provided patient with a One Touch Verio Flex monitor kit.  Sharlene Kelsey MS, APRN, CNS, CDE, CDTC  [phone]

## 2020-01-16 NOTE — PLAN OF CARE
Assumed cares: [id number]  Status: Admitted for SBO    VS: VSS on RA  Neuros: A&O x4  Cardiac: WDL  Respiratory: Lung sounds clear, no SOB reported  GI/: Voiding spontanoeusly, BM x1 this shift  Nutrition: Soft/mechanical diet, tolerating well  IV/Drains: IV infusing NS @ 125 mL/hr  Activity: Up independently in room, ambulated outside x3  Pain: Denies  Skin: Skin intact  Labs: K+ 3.5, replaced, will recheck in AM. Magnesium replaced, will check in AM    Plan of Care: Patient advanced to [facility] soft diet, tolerating well. Plan to discharge home tomorrow, SW involved looking at housing. Endocrinology dropped off meter for patient to use upon discharge. BG covered, carb coverage given per orders. Continue to monitor and update MD with changes.

## 2020-01-16 NOTE — PLAN OF CARE
Care assumed [id number]. A&O. VSS, on room air. PIV L arm saline locked. 1 LR bolus administered. Potassium recheck was 4.4. Magnesium replaced- recheck in am. Pt denies pain. Up ad leix. Clear diet, tolerating well. BG's monitored and corrected. Urinates spontaneously. 2 BM this shift- stool sample sent to lab, C. Diff result was negative. Small area of open skin noted on patient's right buttocks- pt reports he had it prior to coming to the hospital. Cleansed with wound cleanser and covered with primopore.    Plan: continue to monitor BG- pt would like to advance diet, provider aware. Follow up with social work/care coordinator to see about getting patient a home glucometer and CPAP machine.

## 2020-01-16 NOTE — PROVIDER NOTIFICATION
"Paged [phone]    \"Pt reports acid reflux- can you put in a medication order? Pt reports taking Pepcid at home but is open to other options.\"  "

## 2020-01-17 VITALS
HEIGHT: 64 IN | OXYGEN SATURATION: 100 % | DIASTOLIC BLOOD PRESSURE: 80 MMHG | RESPIRATION RATE: 18 BRPM | TEMPERATURE: 97.5 F | WEIGHT: 176 LBS | SYSTOLIC BLOOD PRESSURE: 131 MMHG | BODY MASS INDEX: 30.05 KG/M2 | HEART RATE: 86 BPM

## 2020-01-17 LAB
ANION GAP SERPL CALCULATED.3IONS-SCNC: 8 MMOL/L (ref 3–14)
BUN SERPL-MCNC: 8 MG/DL (ref 7–30)
CALCIUM SERPL-MCNC: 8.7 MG/DL (ref 8.5–10.1)
CHLORIDE SERPL-SCNC: 105 MMOL/L (ref 94–109)
CO2 SERPL-SCNC: 23 MMOL/L (ref 20–32)
CREAT SERPL-MCNC: 0.62 MG/DL (ref 0.66–1.25)
GFR SERPL CREATININE-BSD FRML MDRD: >90 ML/MIN/{1.73_M2}
GLUCOSE BLDC GLUCOMTR-MCNC: 168 MG/DL (ref 70–99)
GLUCOSE BLDC GLUCOMTR-MCNC: 281 MG/DL (ref 70–99)
GLUCOSE BLDC GLUCOMTR-MCNC: 327 MG/DL (ref 70–99)
GLUCOSE BLDC GLUCOMTR-MCNC: 362 MG/DL (ref 70–99)
GLUCOSE BLDC GLUCOMTR-MCNC: 362 MG/DL (ref 70–99)
GLUCOSE SERPL-MCNC: 375 MG/DL (ref 70–99)
MAGNESIUM SERPL-MCNC: 2 MG/DL (ref 1.6–2.3)
PLATELET # BLD AUTO: 302 10E9/L (ref 150–450)
POTASSIUM SERPL-SCNC: 4.3 MMOL/L (ref 3.4–5.3)
SODIUM SERPL-SCNC: 136 MMOL/L (ref 133–144)

## 2020-01-17 PROCEDURE — 99207 ZZC CDG-CUT & PASTE-POTENTIAL IMPACT ON LEVEL: CPT | Performed by: INTERNAL MEDICINE

## 2020-01-17 PROCEDURE — 80048 BASIC METABOLIC PNL TOTAL CA: CPT | Performed by: INTERNAL MEDICINE

## 2020-01-17 PROCEDURE — 85049 AUTOMATED PLATELET COUNT: CPT | Performed by: INTERNAL MEDICINE

## 2020-01-17 PROCEDURE — 99239 HOSP IP/OBS DSCHRG MGMT >30: CPT | Mod: GC | Performed by: INTERNAL MEDICINE

## 2020-01-17 PROCEDURE — 83735 ASSAY OF MAGNESIUM: CPT | Performed by: INTERNAL MEDICINE

## 2020-01-17 PROCEDURE — 36415 COLL VENOUS BLD VENIPUNCTURE: CPT | Performed by: INTERNAL MEDICINE

## 2020-01-17 PROCEDURE — 90682 RIV4 VACC RECOMBINANT DNA IM: CPT | Performed by: INTERNAL MEDICINE

## 2020-01-17 PROCEDURE — 00000146 ZZHCL STATISTIC GLUCOSE BY METER IP

## 2020-01-17 PROCEDURE — 25000128 H RX IP 250 OP 636: Performed by: INTERNAL MEDICINE

## 2020-01-17 PROCEDURE — 25000132 ZZH RX MED GY IP 250 OP 250 PS 637: Performed by: STUDENT IN AN ORGANIZED HEALTH CARE EDUCATION/TRAINING PROGRAM

## 2020-01-17 RX ADMIN — INSULIN ASPART 7 UNITS: 100 INJECTION, SOLUTION INTRAVENOUS; SUBCUTANEOUS at 08:08

## 2020-01-17 RX ADMIN — DICLOFENAC 0.5 G: 10 GEL TOPICAL at 08:05

## 2020-01-17 RX ADMIN — INFLUENZA A VIRUS A/BRISBANE/02/2018 (H1N1) RECOMBINANT HEMAGGLUTININ ANTIGEN, INFLUENZA A VIRUS A/KANSAS/14/2017 (H3N2) RECOMBINANT HEMAGGLUTININ ANTIGEN, INFLUENZA B VIRUS B/PHUKET/3073/2013 RECOMBINANT HEMAGGLUTININ ANTIGEN, AND INFLUENZA B VIRUS B/MARYLAND/15/2016 RECOMBINANT HEMAGGLUTININ ANTIGEN 0.5 ML: 45; 45; 45; 45 INJECTION INTRAMUSCULAR at 11:04

## 2020-01-17 RX ADMIN — INSULIN ASPART 6 UNITS: 100 INJECTION, SOLUTION INTRAVENOUS; SUBCUTANEOUS at 07:05

## 2020-01-17 RX ADMIN — BICTEGRAVIR SODIUM, EMTRICITABINE, AND TENOFOVIR ALAFENAMIDE FUMARATE 1 TABLET: 50; 200; 25 TABLET ORAL at 08:05

## 2020-01-17 RX ADMIN — INSULIN ASPART 10 UNITS: 100 INJECTION, SOLUTION INTRAVENOUS; SUBCUTANEOUS at 11:57

## 2020-01-17 ASSESSMENT — ACTIVITIES OF DAILY LIVING (ADL)
ADLS_ACUITY_SCORE: 12

## 2020-01-17 NOTE — PROGRESS NOTES
Diabetes Consult Daily  Progress Note          Assessment/Plan:                           Andrew Higgins is a 51 year old male with history of type 2 diabetic, HIV/AIDS ( CD4 401, RNA 79 on 10/31/3019), DANISHA, CNS toxoplasmosis, gastric ulcers chronic abdominal pain, recurrent small bowel obstructions of unknown etiology, presented with left-sided abdominal pain, found to have partial small bowel obstruction. Admitted on (1/14/2020)        Type 2 diabetic: poorly controled A1C 12.3% (1/7/2020).   It is unclear how much insulin Mr. Lockwood is actually giving himself.  -in the past when  Mr. Lockwood starts eating, insulin requirements increases.     Plan  - increase  lantus from 20 units in the morning to 30 units am (0900)  -novolog prandial insulin 1 unit per 12 grams of CHO for meals/snacks/supplements, increase to 1 unit per 8 grams of CHO  - Novolog custom correction 1 unit per 40 > 140 before meals and > 200 Hs, changed to high correction scale starting this am  -monitor glcusoe before meals, HS and 0200  -hypoglycemia protocol  -will continue to follow                         Plan for discharge:  Not ideal, but  will use previous insulin amounts from past admission ( did not have hypoglycemia with this amount). Most likely will resume his Lantus 40 units twice daily and Novolog 7 units with meals)  lantus 30 units twice daily  Novolog 8 units with meals  - received new glucometer One Touch Verio Flex and supplies     Outpatient diabetes follow up: Missed appointment with Ashley Connelly PA-c today at clinic, provided mr. Lockwood with clinic phone numbers to make a new appointment     Plan discussed with patient, bedside RN, and primary team.           Interval History:   The last 24 hours progress and nursing notes reviewed.    Blood sugars to 60 yesterday ater receiving sliding scale insulin from a post prandial glucose.  Glucose spiked over night into the 300's, most likely was eating during the night  "without coverage.  Am glucose 362, was given correction insulin and meal insulin later.  Recheck of glucose ~1.5 hours after meal insulin, glucose 281.  Reviewed plan with Andrew, had him teach back the amount of insulin he was going to use.  He has not called for a clinic appointment, was to call yesterday. Said he would call today before leaving.All questions were answered.  Has a healthy appetie        Recent Labs   Lab 01/17/20  0704 01/17/20  0446 01/17/20  0215 01/16/20  2132 01/16/20  1743 01/16/20  1610 01/16/20  1555  01/16/20  0501  01/15/20  0441  01/14/20  1100   GLC  --  375*  --   --   --   --   --   --  121*  --  98  --  336*   *  --  362* 338* 262* 113* 80   < >  --    < >  --    < >  --     < > = values in this interval not displayed.               Review of Systems:   See interval hx          Medications:     Orders Placed This Encounter      Diet      Consistent Carbohydrate Diet 8539-7140 Calories: Moderate Consistent CHO (4-6 CHO units/meal)       Physical Exam:  Gen: Alert, resting in bed, in NAD   HEENT:  mucous membranes are moist  Resp: non-labored  Ext: moving all extremities  Neuro:oriented x3, communicating clearly  /80 (BP Location: Left arm)   Pulse 86   Temp 97.5  F (36.4  C) (Oral)   Resp 18   Ht 1.626 m (5' 4\")   Wt 79.8 kg (176 lb)   SpO2 100%   BMI 30.21 kg/m             Data:     Lab Results   Component Value Date    A1C 12.3 01/07/2020    A1C 10.4 10/17/2019    A1C 10.2 06/07/2019    A1C 8.9 07/19/2018    A1C 6.0 08/11/2017              CBC RESULTS:   Recent Labs   Lab Test 01/17/20 0446 01/16/20  0501   WBC  --  5.4   RBC  --  4.15*   HGB  --  12.6*   HCT  --  37.4*   MCV  --  90   MCH  --  30.4   MCHC  --  33.7   RDW  --  12.9    274     Recent Labs   Lab Test 01/17/20  0446 01/16/20  0501    142   POTASSIUM 4.3 3.5   CHLORIDE 105 114*   CO2 23 23   ANIONGAP 8 6   * 121*   BUN 8 4*   CR 0.62* 0.60*   LINDA 8.7 8.0*     Liver Function " Studies -   Recent Labs   Lab Test 01/14/20  1100   PROTTOTAL 7.9   ALBUMIN 3.7   BILITOTAL 0.6   ALKPHOS 140   AST 9   ALT 25     Lab Results   Component Value Date    INR 1.03 10/11/2019    INR 1.06 02/05/2018    INR 0.98 01/01/2018    INR 0.91 11/06/2017    INR 1.00 05/26/2017    INR 1.05 12/26/2016    INR 1.03 11/06/2016    INR 1.14 07/25/2016    INR 1.10 05/10/2016    INR 1.05 01/22/2016    INR 1.06 01/09/2016    INR 1.04 12/25/2015           Nitesh Betancur -[phone]  Diabetes Management job code [id number]

## 2020-01-17 NOTE — PLAN OF CARE
Patient discharged to  facility, [address] via transit system, tokens received from . PIV removed. Prescriptions picked up from pharmacy. Paperwork gone over with writer and all questions answered. Stable and ambulatory on discharge.

## 2020-01-17 NOTE — PLAN OF CARE
Assumed care 1500 to 2330.   Pt alert and oriented. Vital signs stable except /86.   Pt BG 60 at change of shift. Pt given orange juice and glucose gel. g went up to 80. Then BG went up to 113. Pt BG was 262 at dinner 4 units given for coverage and 2 units given for carb coverage. Pt  at bedtime 4 units given.  Pt had some pain in lower legs. PRN tylenol given  Pt up independently. Pt goes outside to smoke.   Pt had loose BM today and is voiding adequately.   Pt ate lunch late around 1500 with 71 carbs and pt ate dinner at 1800 with 24 carbs.   Doctors spoke with pt. Pt no longer discharging today, but may discharge tomorrow if pt is feeling better and is stable.   Will continue to monitor

## 2020-01-17 NOTE — PROGRESS NOTES
Social Work Services Progress Note     Hospital Day: 4  Collaborated with:  Primary team Cristina 3, [facility] (ph [phone])     Data:  Pt is a 52-year-old male admitted to [facility] 1/14/20  with 24 hours of left-sided abdominal pain found to have partial SBP on CT A/P.      Intervention:  Pt stable for return to [facility] today. Spoke with someone at [facility]'s program who transferred call to pt's counselor Mirian- left PADMINI. Called [facility]'s program again and left  on main line. Have continued to call [facility]'s program numerous times and only receive voicemail.     Assessment:  Attempting for pt to return to CD treatment      Plan:    Anticipated Disposition:  CD treatment    Barriers to d/c plan:  Reaching CD treatment program    Follow Up:  SW to follow for discharge planning     RADHA Reeves, [facility]    [facility]  Pager [phone]  Phone [phone]    Addendum 1:04pm: Received call from Mirian (ph [phone] ext [phone]) at [facility] stating pt can return any time. Pt requested bus tokens and plans to take bus to treatment center. Provided pt with 2 bus tokens. Faxed discharge orders to Mirian at [phone].

## 2020-01-17 NOTE — DISCHARGE SUMMARY
[facility] [place]  Discharge Summary - Medicine & Pediatrics       Date of Admission:  1/14/2020  Date of Discharge:  1/17/2020  Discharging Provider: Veronica Randall Service: [place] 3    Discharge Diagnoses   Small Bowel Obstruction  HIV Infection  History of HIV related illness      Follow-ups Needed After Discharge   Follow-up Appointments     Adult [facility] Follow-up and recommended labs and tests      Follow up with primary care provider, Caitie Lamb, within 7   days for hospital follow- up.  No follow up labs or test are needed.    Follow up with general surgery in 4 weeks to evaluate for recurrent SBO  Follow up with Endocrinology at next scheduled       Appointments on [place] and/or [facility] (with [facility] or [facility]   provider or service). Call [phone] if you haven't heard regarding   these appointments within 7 days of discharge.             Unresulted Labs Ordered in the Past 30 Days of this Admission     No orders found from 12/15/2019 to 1/15/2020.          Discharge Disposition   Discharged to [facility] facility  Condition at discharge: Stable    Hospital Course   Andrew Lockwood is a 52 year old male admitted on 1/14/2020. He has a history significant for HIV/AIDS, CNS toxoplasmosis, DM2, chronic abdominal pain, recurrent SBOs of unknown etiology , s/p appendectomy here with 24 hours of left-sided abdominal pain. Currently residing at [facility], where he has been 10 days after meth use. Long history of homelessness. Found to have partial SBO on CT Abdomen and admitted to the floor for conservative management. The following problems were addressed:    # Small Bowel Obstruction  General surgery was consulted from the ED and followed the pt thru this hospitalization. NG decompression was performed followed by clamping and then removal as the patient's diet was advanced. By discharge he was tolerating a regular diet. He will follow up  with general surgery in clinic 4 weeks post discharge for evaluation of recurrent SBO.    # Diabetes Mellitus type 2  The patient had poor control per A1c drawn 1 week prior to admission and was noted to have poor hyperglycemic control. He underwent diabetes education and his insulin regimen was revised. He was given a glucometer and supplies as well as resupply of insulin on discharge. He will follow up in clinic post discharge. Insulin regimen for discharge is as follows:  - lantus 30 units bid  - Novolog 8 units w/ meals    # HIV infection  # History of HIV related infections  Pt has active HIV infection treated with antiretrovirals, and CD4 count 438 as of labs 1 week prior to admission. Antiretrovirals were continued this admission. No further workup was done.     # Chronic/Outpatient problems not addressed  - Pt recently had CAP and received a total of 7d augmentin with 5d Azithromycin which should be adequate treatment. 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 Never followed up. Asymptomatic. Consider CRC outpatient follow up  - Pt c/o R testicular numbness. Consider urology follow up.    Consultations This Hospital Stay   ENDOCRINE DIABETES ADULT IP CONSULT  CNS DIABETES IP CONSULT  DIABETES EDUCATION IP CONSULT  SOCIAL WORK IP CONSULT  VASCULAR ACCESS CARE ADULT IP CONSULT  DIABETES EDUCATION IP CONSULT    Code Status   Full Code       The patient was discussed with Dr. Veronica Stephens MD  [facility] 3 Service  [facility] [place]  Pager: [phone]  ______________________________________________________________________    Physical Exam   Vital Signs: Temp: 97.5  F (36.4  C) Temp src: Oral BP: 131/80 Pulse: 86 Heart Rate: 78 Resp: 18 SpO2: 100 % O2 Device: None (Room air)    Weight: 176 lbs 0 oz  General: Pleasant  male, lying in bed; no acute distress  Skin: No noticeable rashes, eruptions  Lymph: no palpable cervical LAD  HEENT: Head normocephalic,  atraumatic; external ears appear normal; no nasal flaring, grunting; oropharyngeal mucosa moist & without lesion; uvula midline; neck supple w/ trachea midline  Respiratory: Normal work of breathing; CTA in anterior lung fields  Cardiac: Regular rate and rhythm; no murmurs, rubs or gallops  Abdomen: soft nontender BS+.   MSK: No amputations, deformities or visible joint swelling; normal strength bilaterally  Extremities: No LE edema  Neuro: A&Ox3, no focal deficits  Psych: playful mood, full affect      Primary Care Physician   Caitie Lamb    Discharge Orders      General Surg Adult Referral      Reason for your hospital stay    Small bowel obstruction     Adult [facility] Follow-up and recommended labs and tests    Follow up with primary care provider, Caitie Lamb, within 7 days for hospital follow- up.  No follow up labs or test are needed.    Follow up with general surgery in 4 weeks to evaluate for recurrent SBO  Follow up with Endocrinology at next scheduled       Appointments on [place] and/or [facility] (with [facility] or [facility] provider or service). Call [phone] if you haven't heard regarding these appointments within 7 days of discharge.     Activity    Your activity upon discharge: activity as tolerated     When to contact your care team    Call your primary doctor if you have any of the following: temperature greater than 100.4 or increased nausea/vomiting with similar pain that brought you into the hospital     Discharge Instructions    Follow up with General Surgery in 4 weeks.   Diabetes care as you have been trained.  Refrain from illicit drug use.  Take it easy with your diet.     Full Code     Diet    Follow this diet upon discharge: Orders Placed This Encounter      Mechanical/Dental Soft Diet       Significant Results and Procedures   CT Abdomen/Pelvis 1/14/2020  1. Dilated loop of small bowel in the low midabdomen without high-grade transition point and with mild  associated small bowel wall thickening and enhancement. Similar findings were present on 10/5/2018  CT and they are again suggestive of infectious/inflammatory enteritis with associated functional partial small bowel obstruction.  2. Hyperenhancement and wall thickening involving the pylorus/duodenal bulb, suggestive of duodenitis.  3. Mild circumferential distal esophageal wall thickening, compatible with esophagitis.  4. Scattered nodular opacities and bronchial wall thickening in the lung bases, may represent infection, including atypical fungal etiologies given patient's immunocompromised status.  5. Diverticulosis without CT evidence of acute diverticulitis.    Discharge Medications   Current Discharge Medication List      START taking these medications    Details   !! blood glucose monitoring (NO BRAND SPECIFIED) meter device kit Use to test blood sugar 4 times daily or as directed.  Qty: 1 kit, Refills: 0    Associated Diagnoses: Type 2 diabetes mellitus without complication, with long-term current use of insulin (H)      ondansetron (ZOFRAN-ODT) 4 MG ODT tab Take 1 tablet (4 mg) by mouth every 6 hours as needed for nausea or vomiting  Qty: 12 tablet, Refills: 0    Associated Diagnoses: SBO (small bowel obstruction) (H)       !! - Potential duplicate medications found. Please discuss with provider.      CONTINUE these medications which have CHANGED    Details   insulin aspart (NOVOLOG PEN) 100 UNIT/ML pen Inject 8 Units Subcutaneous 3 times daily (with meals)  Qty: 12 mL, Refills: 0    Associated Diagnoses: Type 2 diabetes mellitus without complication, with long-term current use of insulin (H)      insulin glargine (LANTUS PEN) 100 UNIT/ML pen Inject 30 Units Subcutaneous 2 times daily  Qty: 15 mL, Refills: 0    Comments: If Lantus is not covered by insurance, may substitute Basaglar at same dose and frequency.    Associated Diagnoses: Hyperglycemia         CONTINUE these medications which have NOT CHANGED     Details   bictegravir-emtricitabine-tenofovir (BIKTARVY) -25 MG per tablet Take 1 tablet by mouth daily  Qty: 30 tablet, Refills: 11    Associated Diagnoses: HIV infection, unspecified symptom status (H)      famotidine (PEPCID) 20 MG tablet Take 1 tablet (20 mg) by mouth 2 times daily  Qty: 60 tablet, Refills: 1    Associated Diagnoses: Gastroesophageal reflux disease without esophagitis      ibuprofen (ADVIL/MOTRIN) 200 MG tablet Take 600 mg by mouth every 4 hours as needed for mild pain      blood glucose (NO BRAND SPECIFIED) lancets standard Use to test blood sugar four times daily or as directed.  Qty: 100 each, Refills: 11    Comments: Please provide lancets that are covered by insurance.  Associated Diagnoses: Type 2 diabetes mellitus without complication, without long-term current use of insulin (H)      blood glucose (NO BRAND SPECIFIED) test strip 1 strip by In Vitro route 4 times daily (before meals and nightly) Use to test blood sugars 4 times daily or as directed  Qty: 100 strip, Refills: 11    Comments: Please provide test strips that are covered by insurance.  Associated Diagnoses: Type 2 diabetes mellitus with complication, without long-term current use of insulin (H)      !! blood glucose monitoring (NO BRAND SPECIFIED) meter device kit Use to test blood sugar 4 times daily or as directed.  Qty: 1 kit, Refills: 0    Comments: Please provide meter that is covered by insurance.  Associated Diagnoses: Type 2 diabetes mellitus with complication, without long-term current use of insulin (H)      cetirizine (ZYRTEC) 10 MG tablet Take 1 tablet (10 mg) by mouth daily  Qty: 30 tablet, Refills: 0      !! insulin pen needle (B-D U/F) 31G X 8 MM miscellaneous Use 1 pen needles daily or as directed.  Qty: 100 each, Refills: 3    Associated Diagnoses: Type 2 diabetes mellitus without complication, without long-term current use of insulin (H)      !! insulin pen needle (BD PEN NEEDLE SCOTT 2ND GEN) 32G X 4  MM miscellaneous Use 3-4 daily.  Qty: 250 each, Refills: 3    Associated Diagnoses: Type 2 diabetes mellitus with hyperglycemia, without long-term current use of insulin (H)       !! - Potential duplicate medications found. Please discuss with provider.      STOP taking these medications       amoxicillin-clavulanate (AUGMENTIN) 875-125 MG tablet Comments:   Reason for Stopping:         methocarbamol (ROBAXIN) 500 MG tablet Comments:   Reason for Stopping:             Allergies   Allergies   Allergen Reactions     Metformin      Abdominal pain     Milk [Lac Bovis] Hives     Dulaglutide Rash     Internal Medicine Staff Addendum  Date of Service: 1/17/2020  I have seen and examined Mr Lockwood, reviewed the data and discussed the plan of care with the care team on rounds.  I agree with the above documentation with the additions/changes to the ROS, HPI, Exam or data (including my edits in italics):    I discussed pt's care with bedside RN, case management/social work today.  I personally reviewed, labs, medications and past 24 hr notes.  Assessment/Plan/Diagnoses: plan/dx as above, which contains my edits and reflects our joint medical decision-making.   >50% of 35 minutes spent in direct patient care and coordinating discharge    Evin Martin MD PhD  Internal Medicine Hospitalist & Staff Physician   of Internal Medicine   [facility]  Pager: [phone]

## 2020-01-17 NOTE — PLAN OF CARE
[facility] CARES: [id number].  STATUS: Pt admitted for recurrent SBO. Hx includes HIV/AIDS, DM2, GERD and HTN.   NEURO: A/o x 4  VS: VSS on RA.   ACTIVITY: Up IND in room  PAIN: Denies pain.   CARDIAC: WNL.   RESP: WNL  GI/: Voids spontaneously. No BM during night.   DIET: CHO.   SKIN: Primapore placed on pt buttocks for small open area, that pt states was present prior to admission.   LDA'S: L PIV SL.  LABS: K+4.3. Mg 2.0. - Most recent- refer to provider notification regarding BG levels overnight and Novolog admin.    .   CHANGES THIS SHIFT: Elevated BG level. Pt slept most of shift.   POC: Cont with POC. Pt is homeless, but currently residing at rehab center for meth abuse. Discharge plan once SBO resolved. Possibly today. Per report pt to take Insulin pens upon discharge= Oncoming nurse notified. Pt has BG machine to take with him as well.  Call light within reach. Will continue to monitor.

## 2020-01-17 NOTE — PROVIDER NOTIFICATION
[phone]   Cristina Rose RN [id number]  Pt . Thanks    Cristina TAFOYA [phone] text paged at [phone]. Will continue to monitor.     Addendum: MD called writer at 0655 regarding pt elevated BG level- Writer explained that team was notified of elevated BG level with no response during night. MD advised writer to give pt mealtime Insulin 6 units. Writer check BG level prior to given (327)- 6 units Novolog given- Francisco crackers placed at bedside d/t pt concerned with BG level dropping to quickly. Writer also assisted pt with ordering breakfast- Oncoming nurse notified.

## 2020-01-20 ENCOUNTER — PATIENT OUTREACH (OUTPATIENT)
Dept: CARE COORDINATION | Facility: CLINIC | Age: 55
End: 2020-01-20

## 2020-01-29 ENCOUNTER — OFFICE VISIT (OUTPATIENT)
Dept: ORTHOPEDICS | Facility: CLINIC | Age: 55
End: 2020-01-29
Payer: COMMERCIAL

## 2020-01-29 VITALS — HEIGHT: 64 IN | BODY MASS INDEX: 30.05 KG/M2 | WEIGHT: 176 LBS

## 2020-01-29 DIAGNOSIS — M67.911 TENDINOPATHY OF RIGHT ROTATOR CUFF: Primary | ICD-10-CM

## 2020-01-29 RX ADMIN — TRIAMCINOLONE ACETONIDE 40 MG: 40 INJECTION, SUSPENSION INTRA-ARTICULAR; INTRAMUSCULAR at 16:06

## 2020-01-29 RX ADMIN — LIDOCAINE HYDROCHLORIDE 4 ML: 10 INJECTION, SOLUTION EPIDURAL; INFILTRATION; INTRACAUDAL; PERINEURAL at 16:06

## 2020-01-29 ASSESSMENT — MIFFLIN-ST. JEOR: SCORE: 1549.33

## 2020-01-29 NOTE — LETTER
[facility]  [address]  [address]LO[address]  [place] [address] [phone]        January 29, 2020    Regarding:  Andrew Lockwood  [address]  [place] [address]              To Whom It May Concern;  Andrew was seen in our clinic today regarding a right shoulder injury. If you have any questions or concerns please let us know.           Sincerely,        Aguila Cannon, DO
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
## 2020-01-29 NOTE — PROGRESS NOTES
Large Joint Injection: R subacromial bursa  Date/Time: 1/29/2020 4:06 PM  Performed by: Aguila Cannon DO  Authorized by: Aguila Cannon DO     Indications:  Pain  Needle Size:  22 G  Guidance: ultrasound    Approach:  Lateral  Location:  Shoulder      Site:  R subacromial bursa  Medications:  40 mg triamcinolone 40 MG/ML; 4 mL lidocaine (PF) 1 %  Outcome:  Tolerated well, no immediate complications  Procedure discussed: discussed risks, benefits, and alternatives    Consent Given by:  Patient  Timeout: timeout called immediately prior to procedure    Prep: patient was prepped and draped in usual sterile fashion

## 2020-01-29 NOTE — LETTER
Date:January 31, 2020      Patient was self referred, no letter generated. Do not send.        [facility] Health Information

## 2020-01-29 NOTE — NURSING NOTE
[address] [address]  Dept: [phone]  ______________________________________________________________________________    Patient: Andrew Lockwood   : 1967   MRN: [id number]   2020    INVASIVE PROCEDURE SAFETY CHECKLIST    Date: 2020  Procedure: Right subacromial kenalog injection  Patient Name: Andrew Lockwood  MRN: [id number]  YOB: 1967    Action: Complete sections as appropriate. Any discrepancy results in a HARD COPY until resolved.     PRE PROCEDURE:  Patient ID verified with 2 identifiers (name and  or MRN): Yes  Procedure and site verified with patient/designee (when able): Yes  Accurate consent documentation in medical record: Yes  H&P (or appropriate assessment) documented in medical record: Yes  H&P must be up to 20 days prior to procedure and updates within 24 hours of procedure as applicable: NA  Relevant diagnostic and radiology test results appropriately labeled and displayed as applicable: Yes  Procedure site(s) marked with provider initials: NA    TIMEOUT:  Time-Out performed immediately prior to starting procedure, including verbal and active participation of all team members addressing the following:Yes  * Correct patient identify  * Confirmed that the correct side and site are marked  * An accurate procedure consent form  * Agreement on the procedure to be done  * Correct patient position  * Relevant images and results are properly labeled and appropriately displayed  * The need to administer antibiotics or fluids for irrigation purposes during the procedure as applicable   * Safety precautions based on patient history or medication use    DURING PROCEDURE: Verification of correct person, site, and procedures any time the responsibility for care of the patient is transferred to another member of the care team.     The following medication was given:     MEDICATION:  Kenalog 40  mg  ROUTE: intra-articular  SITE: right Shoulder  DOSE: 1mL  LOT #: [id number]  : [employer]  EXPIRATION DATE: 10/2021  NDC#: [address]-5   Was there drug waste? No    MEDICATION:  Lidocaine without epinephrine  ROUTE: intra-articular  SITE: right Shoulder  DOSE: 4mL  LOT #: [id number]  : [employer]  EXPIRATION DATE: 01/2023  NDC#: 12883-009-73   Was there drug waste? Yes  Amount of drug waste (mL): 1mL.  Reason for waste:  As per MD    Prior to injection, verified patient identity using patient's name and date of birth.  Due to injection administration, patient instructed to remain in clinic for 15 minutes  afterwards, and to report any adverse reaction to me immediately.    Vinita Mead, AUBREY  January 29, 2020
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
## 2020-01-30 RX ORDER — TRIAMCINOLONE ACETONIDE 40 MG/ML
40 INJECTION, SUSPENSION INTRA-ARTICULAR; INTRAMUSCULAR
Status: DISCONTINUED | OUTPATIENT
Start: 2020-01-29 | End: 2020-02-26

## 2020-01-30 RX ORDER — LIDOCAINE HYDROCHLORIDE 10 MG/ML
4 INJECTION, SOLUTION EPIDURAL; INFILTRATION; INTRACAUDAL; PERINEURAL
Status: DISCONTINUED | OUTPATIENT
Start: 2020-01-29 | End: 2020-02-26
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
## 2020-02-03 ENCOUNTER — HOSPITAL ENCOUNTER (EMERGENCY)
Facility: CLINIC | Age: 55
Discharge: HOME | End: 2020-02-03
Attending: EMERGENCY MEDICINE | Admitting: EMERGENCY MEDICINE
Payer: COMMERCIAL

## 2020-02-03 ENCOUNTER — APPOINTMENT (OUTPATIENT)
Dept: CT IMAGING | Facility: CLINIC | Age: 55
End: 2020-02-03
Attending: EMERGENCY MEDICINE
Payer: COMMERCIAL

## 2020-02-03 VITALS
RESPIRATION RATE: 16 BRPM | HEART RATE: 84 BPM | HEIGHT: 64 IN | DIASTOLIC BLOOD PRESSURE: 87 MMHG | OXYGEN SATURATION: 99 % | BODY MASS INDEX: 27.83 KG/M2 | TEMPERATURE: 98.4 F | SYSTOLIC BLOOD PRESSURE: 125 MMHG | WEIGHT: 163 LBS

## 2020-02-03 VITALS
SYSTOLIC BLOOD PRESSURE: 147 MMHG | BODY MASS INDEX: 29.23 KG/M2 | WEIGHT: 171.2 LBS | TEMPERATURE: 98.4 F | HEIGHT: 64 IN | RESPIRATION RATE: 18 BRPM | OXYGEN SATURATION: 98 % | DIASTOLIC BLOOD PRESSURE: 95 MMHG | HEART RATE: 85 BPM

## 2020-02-03 DIAGNOSIS — E86.0 DEHYDRATION: ICD-10-CM

## 2020-02-03 DIAGNOSIS — Z91.148 PAIN MEDICATION AGREEMENT BROKEN: ICD-10-CM

## 2020-02-03 DIAGNOSIS — Z79.4 ENCOUNTER FOR LONG-TERM (CURRENT) USE OF INSULIN (H): ICD-10-CM

## 2020-02-03 DIAGNOSIS — R73.9 HYPERGLYCEMIA: ICD-10-CM

## 2020-02-03 LAB
ALBUMIN UR-MCNC: NEGATIVE MG/DL
ANION GAP SERPL CALCULATED.3IONS-SCNC: 4 MMOL/L (ref 3–14)
ANION GAP SERPL CALCULATED.3IONS-SCNC: 7 MMOL/L (ref 3–14)
APPEARANCE UR: CLEAR
BASE DEFICIT BLDV-SCNC: 2.6 MMOL/L
BASOPHILS # BLD AUTO: 0.1 10E9/L (ref 0–0.2)
BASOPHILS # BLD AUTO: 0.1 10E9/L (ref 0–0.2)
BASOPHILS NFR BLD AUTO: 0.9 %
BASOPHILS NFR BLD AUTO: 1 %
BILIRUB UR QL STRIP: NEGATIVE
BUN SERPL-MCNC: 14 MG/DL (ref 7–30)
BUN SERPL-MCNC: 21 MG/DL (ref 7–30)
CA-I BLD-SCNC: 5.3 MG/DL (ref 4.4–5.2)
CALCIUM SERPL-MCNC: 8.7 MG/DL (ref 8.5–10.1)
CALCIUM SERPL-MCNC: 9 MG/DL (ref 8.5–10.1)
CHLORIDE SERPL-SCNC: 100 MMOL/L (ref 94–109)
CHLORIDE SERPL-SCNC: 103 MMOL/L (ref 94–109)
CO2 BLDCOV-SCNC: 21 MMOL/L (ref 21–28)
CO2 SERPL-SCNC: 24 MMOL/L (ref 20–32)
CO2 SERPL-SCNC: 24 MMOL/L (ref 20–32)
COLOR UR AUTO: ABNORMAL
CREAT SERPL-MCNC: 0.59 MG/DL (ref 0.66–1.25)
CREAT SERPL-MCNC: 0.62 MG/DL (ref 0.66–1.25)
DIFFERENTIAL METHOD BLD: ABNORMAL
DIFFERENTIAL METHOD BLD: NORMAL
EOSINOPHIL # BLD AUTO: 0.2 10E9/L (ref 0–0.7)
EOSINOPHIL # BLD AUTO: 0.2 10E9/L (ref 0–0.7)
EOSINOPHIL NFR BLD AUTO: 2.3 %
EOSINOPHIL NFR BLD AUTO: 2.5 %
ERYTHROCYTE [DISTWIDTH] IN BLOOD BY AUTOMATED COUNT: 13 % (ref 10–15)
ERYTHROCYTE [DISTWIDTH] IN BLOOD BY AUTOMATED COUNT: 13.2 % (ref 10–15)
GFR SERPL CREATININE-BSD FRML MDRD: >90 ML/MIN/{1.73_M2}
GFR SERPL CREATININE-BSD FRML MDRD: >90 ML/MIN/{1.73_M2}
GLUCOSE BLD-MCNC: 400 MG/DL (ref 70–99)
GLUCOSE BLDC GLUCOMTR-MCNC: 197 MG/DL (ref 70–99)
GLUCOSE BLDC GLUCOMTR-MCNC: 251 MG/DL (ref 70–99)
GLUCOSE BLDC GLUCOMTR-MCNC: 287 MG/DL (ref 70–99)
GLUCOSE BLDC GLUCOMTR-MCNC: 353 MG/DL (ref 70–99)
GLUCOSE BLDC GLUCOMTR-MCNC: 368 MG/DL (ref 70–99)
GLUCOSE BLDC GLUCOMTR-MCNC: 388 MG/DL (ref 70–99)
GLUCOSE BLDC GLUCOMTR-MCNC: 393 MG/DL (ref 70–99)
GLUCOSE BLDC GLUCOMTR-MCNC: 434 MG/DL (ref 70–99)
GLUCOSE SERPL-MCNC: 394 MG/DL (ref 70–99)
GLUCOSE SERPL-MCNC: 529 MG/DL (ref 70–99)
GLUCOSE UR STRIP-MCNC: >1000 MG/DL
HCO3 BLDV-SCNC: 23 MMOL/L (ref 21–28)
HCT VFR BLD AUTO: 38.1 % (ref 40–53)
HCT VFR BLD AUTO: 40.6 % (ref 40–53)
HCT VFR BLD CALC: 41 %PCV (ref 40–53)
HGB BLD CALC-MCNC: 13.9 G/DL (ref 13.3–17.7)
HGB BLD-MCNC: 13.3 G/DL (ref 13.3–17.7)
HGB BLD-MCNC: 13.8 G/DL (ref 13.3–17.7)
HGB UR QL STRIP: NEGATIVE
IMM GRANULOCYTES # BLD: 0.1 10E9/L (ref 0–0.4)
IMM GRANULOCYTES # BLD: 0.1 10E9/L (ref 0–0.4)
IMM GRANULOCYTES NFR BLD: 1.3 %
IMM GRANULOCYTES NFR BLD: 1.7 %
KETONES BLD-SCNC: 0 MMOL/L (ref 0–0.6)
KETONES UR STRIP-MCNC: NEGATIVE MG/DL
LACTATE BLD-SCNC: 1.4 MMOL/L (ref 0.7–2)
LACTATE BLD-SCNC: 2.1 MMOL/L (ref 0.7–2)
LEUKOCYTE ESTERASE UR QL STRIP: NEGATIVE
LYMPHOCYTES # BLD AUTO: 2.6 10E9/L (ref 0.8–5.3)
LYMPHOCYTES # BLD AUTO: 3 10E9/L (ref 0.8–5.3)
LYMPHOCYTES NFR BLD AUTO: 33.7 %
LYMPHOCYTES NFR BLD AUTO: 35.3 %
MCH RBC QN AUTO: 30.5 PG (ref 26.5–33)
MCH RBC QN AUTO: 30.9 PG (ref 26.5–33)
MCHC RBC AUTO-ENTMCNC: 34 G/DL (ref 31.5–36.5)
MCHC RBC AUTO-ENTMCNC: 34.9 G/DL (ref 31.5–36.5)
MCV RBC AUTO: 89 FL (ref 78–100)
MCV RBC AUTO: 90 FL (ref 78–100)
MONOCYTES # BLD AUTO: 0.6 10E9/L (ref 0–1.3)
MONOCYTES # BLD AUTO: 0.6 10E9/L (ref 0–1.3)
MONOCYTES NFR BLD AUTO: 6.7 %
MONOCYTES NFR BLD AUTO: 7.2 %
MUCOUS THREADS #/AREA URNS LPF: PRESENT /LPF
NEUTROPHILS # BLD AUTO: 4.2 10E9/L (ref 1.6–8.3)
NEUTROPHILS # BLD AUTO: 4.6 10E9/L (ref 1.6–8.3)
NEUTROPHILS NFR BLD AUTO: 53.3 %
NEUTROPHILS NFR BLD AUTO: 54.1 %
NITRATE UR QL: NEGATIVE
NRBC # BLD AUTO: 0 10*3/UL
NRBC # BLD AUTO: 0 10*3/UL
NRBC BLD AUTO-RTO: 0 /100
NRBC BLD AUTO-RTO: 0 /100
O2/TOTAL GAS SETTING VFR VENT: 21 %
PCO2 BLDV: 38 MM HG (ref 40–50)
PCO2 BLDV: 41 MM HG (ref 40–50)
PH BLDV: 7.35 PH (ref 7.32–7.43)
PH BLDV: 7.36 PH (ref 7.32–7.43)
PH UR STRIP: 6.5 PH (ref 5–7)
PLATELET # BLD AUTO: 325 10E9/L (ref 150–450)
PLATELET # BLD AUTO: 346 10E9/L (ref 150–450)
PO2 BLDV: 70 MM HG (ref 25–47)
PO2 BLDV: 72 MM HG (ref 25–47)
POTASSIUM BLD-SCNC: 4.1 MMOL/L (ref 3.4–5.3)
POTASSIUM SERPL-SCNC: 3.3 MMOL/L (ref 3.4–5.3)
POTASSIUM SERPL-SCNC: 4 MMOL/L (ref 3.4–5.3)
RBC # BLD AUTO: 4.3 10E12/L (ref 4.4–5.9)
RBC # BLD AUTO: 4.52 10E12/L (ref 4.4–5.9)
RBC #/AREA URNS AUTO: 1 /HPF (ref 0–2)
SAO2 % BLDV FROM PO2: 93 %
SODIUM BLD-SCNC: 134 MMOL/L (ref 133–144)
SODIUM SERPL-SCNC: 131 MMOL/L (ref 133–144)
SODIUM SERPL-SCNC: 131 MMOL/L (ref 133–144)
SOURCE: ABNORMAL
SP GR UR STRIP: 1.03 (ref 1–1.03)
UROBILINOGEN UR STRIP-MCNC: NORMAL MG/DL (ref 0–2)
WBC # BLD AUTO: 7.8 10E9/L (ref 4–11)
WBC # BLD AUTO: 8.6 10E9/L (ref 4–11)
WBC #/AREA URNS AUTO: 1 /HPF (ref 0–5)

## 2020-02-03 PROCEDURE — 80048 BASIC METABOLIC PNL TOTAL CA: CPT | Performed by: EMERGENCY MEDICINE

## 2020-02-03 PROCEDURE — 96372 THER/PROPH/DIAG INJ SC/IM: CPT

## 2020-02-03 PROCEDURE — 99284 EMERGENCY DEPT VISIT MOD MDM: CPT | Mod: Z6 | Performed by: EMERGENCY MEDICINE

## 2020-02-03 PROCEDURE — 82803 BLOOD GASES ANY COMBINATION: CPT

## 2020-02-03 PROCEDURE — 96374 THER/PROPH/DIAG INJ IV PUSH: CPT | Mod: 59

## 2020-02-03 PROCEDURE — 25000131 ZZH RX MED GY IP 250 OP 636 PS 637: Performed by: EMERGENCY MEDICINE

## 2020-02-03 PROCEDURE — 96360 HYDRATION IV INFUSION INIT: CPT | Mod: 59

## 2020-02-03 PROCEDURE — 40000498 ZZHCL STATISTIC POTASSIUM ED POCT

## 2020-02-03 PROCEDURE — 00000146 ZZHCL STATISTIC GLUCOSE BY METER IP

## 2020-02-03 PROCEDURE — 83605 ASSAY OF LACTIC ACID: CPT | Performed by: EMERGENCY MEDICINE

## 2020-02-03 PROCEDURE — 85025 COMPLETE CBC W/AUTO DIFF WBC: CPT | Performed by: EMERGENCY MEDICINE

## 2020-02-03 PROCEDURE — 40000497 ZZHCL STATISTIC SODIUM ED POCT

## 2020-02-03 PROCEDURE — 25000128 H RX IP 250 OP 636: Performed by: EMERGENCY MEDICINE

## 2020-02-03 PROCEDURE — 25000128 H RX IP 250 OP 636: Performed by: STUDENT IN AN ORGANIZED HEALTH CARE EDUCATION/TRAINING PROGRAM

## 2020-02-03 PROCEDURE — 82803 BLOOD GASES ANY COMBINATION: CPT | Performed by: EMERGENCY MEDICINE

## 2020-02-03 PROCEDURE — 96361 HYDRATE IV INFUSION ADD-ON: CPT

## 2020-02-03 PROCEDURE — 82010 KETONE BODYS QUAN: CPT | Performed by: EMERGENCY MEDICINE

## 2020-02-03 PROCEDURE — 25000132 ZZH RX MED GY IP 250 OP 250 PS 637: Performed by: EMERGENCY MEDICINE

## 2020-02-03 PROCEDURE — 74177 CT ABD & PELVIS W/CONTRAST: CPT

## 2020-02-03 PROCEDURE — 25800030 ZZH RX IP 258 OP 636: Performed by: EMERGENCY MEDICINE

## 2020-02-03 PROCEDURE — 81001 URINALYSIS AUTO W/SCOPE: CPT | Performed by: EMERGENCY MEDICINE

## 2020-02-03 PROCEDURE — 40000501 ZZHCL STATISTIC HEMATOCRIT ED POCT

## 2020-02-03 PROCEDURE — 99285 EMERGENCY DEPT VISIT HI MDM: CPT | Mod: 25

## 2020-02-03 PROCEDURE — 99284 EMERGENCY DEPT VISIT MOD MDM: CPT | Mod: 25

## 2020-02-03 PROCEDURE — 40000502 ZZHCL STATISTIC GLUCOSE ED POCT

## 2020-02-03 PROCEDURE — 82330 ASSAY OF CALCIUM: CPT

## 2020-02-03 RX ORDER — ONDANSETRON 2 MG/ML
4 INJECTION INTRAMUSCULAR; INTRAVENOUS EVERY 30 MIN PRN
Status: DISCONTINUED | OUTPATIENT
Start: 2020-02-03 | End: 2020-02-03 | Stop reason: HOSPADM

## 2020-02-03 RX ORDER — KETOROLAC TROMETHAMINE 15 MG/ML
15 INJECTION, SOLUTION INTRAMUSCULAR; INTRAVENOUS ONCE
Status: COMPLETED | OUTPATIENT
Start: 2020-02-03 | End: 2020-02-03

## 2020-02-03 RX ORDER — IOPAMIDOL 755 MG/ML
105 INJECTION, SOLUTION INTRAVASCULAR ONCE
Status: COMPLETED | OUTPATIENT
Start: 2020-02-03 | End: 2020-02-03

## 2020-02-03 RX ORDER — POTASSIUM CHLORIDE 1.5 G/1.58G
40 POWDER, FOR SOLUTION ORAL ONCE
Status: COMPLETED | OUTPATIENT
Start: 2020-02-03 | End: 2020-02-03

## 2020-02-03 RX ORDER — SODIUM CHLORIDE 9 MG/ML
1000 INJECTION, SOLUTION INTRAVENOUS CONTINUOUS
Status: DISCONTINUED | OUTPATIENT
Start: 2020-02-03 | End: 2020-02-03 | Stop reason: HOSPADM

## 2020-02-03 RX ORDER — INSULIN LISPRO 100 [IU]/ML
5 INJECTION, SOLUTION INTRAVENOUS; SUBCUTANEOUS ONCE
Status: DISCONTINUED | OUTPATIENT
Start: 2020-02-03 | End: 2020-02-03

## 2020-02-03 RX ADMIN — SODIUM CHLORIDE 1000 ML: 900 INJECTION, SOLUTION INTRAVENOUS at 05:26

## 2020-02-03 RX ADMIN — POTASSIUM CHLORIDE 40 MEQ: 1.5 POWDER, FOR SOLUTION ORAL at 06:12

## 2020-02-03 RX ADMIN — KETOROLAC TROMETHAMINE 15 MG: 15 INJECTION, SOLUTION INTRAMUSCULAR; INTRAVENOUS at 16:37

## 2020-02-03 RX ADMIN — SODIUM CHLORIDE 1000 ML: 9 INJECTION, SOLUTION INTRAVENOUS at 16:38

## 2020-02-03 RX ADMIN — IOPAMIDOL 105 ML: 755 INJECTION, SOLUTION INTRAVENOUS at 17:38

## 2020-02-03 RX ADMIN — INSULIN ASPART 8 UNITS: 100 INJECTION, SOLUTION INTRAVENOUS; SUBCUTANEOUS at 06:57

## 2020-02-03 RX ADMIN — INSULIN ASPART 5 UNITS: 100 INJECTION, SOLUTION INTRAVENOUS; SUBCUTANEOUS at 20:03

## 2020-02-03 RX ADMIN — SODIUM CHLORIDE 1000 ML: 9 INJECTION, SOLUTION INTRAVENOUS at 04:08

## 2020-02-03 ASSESSMENT — MIFFLIN-ST. JEOR
SCORE: 1490.36
SCORE: 1527.56

## 2020-02-03 NOTE — ED TRIAGE NOTES
BIBA for glucose of 542  States no meth use for today  States no meth use in 31 days  Arrives a/o/a.  Had 7 units humalog pta  Normal lantus usage in the evening.

## 2020-02-03 NOTE — ED AVS SNAPSHOT
[facility], [place], Emergency Department  [address] [place] [address]  Phone:  [phone]                                    Andrew Lockwood   MRN: [id number]    Department:  [facility], [place], Emergency Department   Date of Visit:  2/3/2020           After Visit Summary Signature Page    I have received my discharge instructions, and my questions have been answered. I have discussed any challenges I see with this plan with the nurse or doctor.    ..........................................................................................................................................  Patient/Patient Representative Signature      ..........................................................................................................................................  Patient Representative Print Name and Relationship to Patient    ..................................................               ................................................  Date                                   Time    ..........................................................................................................................................  Reviewed by Signature/Title    ...................................................              ..............................................  Date                                               Time          22EPIC Rev 08/18

## 2020-02-03 NOTE — ED PROVIDER NOTES
"  History     Chief Complaint   Patient presents with     Hyperglycemia     HPI  Andrew Lockwood is a 52 year old male history of HIV AIDS, CNS toxoplasmosis, type 2 diabetes insulin-dependent mild pain and history of recurrent SBO's of uncertain etiology.  He was recently admitted and treated conservatively for a partial SBO.    Returns today due to hyperglycemia.  He had lost his glucometer, was unable to appropriately administer his insulin on this account and then noted that his glucose was quite elevated.  History of poor control of his diabetes.    Feels minimally weak and dehydrated but otherwise has no complaints.  Denies fevers or chills chest pain or shortness of breath.  No vomiting no abdominal pain or cramping.    I have reviewed the Medications, Allergies, Past Medical and Surgical History, and Social History in the Epic system.    Review of Systems   10 point review of symptoms was performed and is negative except as noted above.     Physical Exam   BP: (!) 124/105  Pulse: 84  Temp: 98  F (36.7  C)  Resp: 18  Height: 162.6 cm (5' 4\")  Weight: 73.9 kg (163 lb)  SpO2: 98 %      Physical Exam  GEN: Well appearing, non toxic, cooperative and conversant.   HEENT: The head is normocephalic and atraumatic. Pupils are equal round and reactive to light. Extraocular motions are intact. There is no facial swelling. The neck is nontender and supple.   CV: Regular rate and rhythm without murmurs rubs or gallops. 2+ radial pulses bilaterally.  PULM: Clear to auscultation bilaterally.  ABD: Soft, nontender, nondistended.   EXT: Full range of motion.  No edema.  NEURO: Cranial nerves II through XII are intact and symmetric. Bilateral upper and lower extremities grossly show full range of motion without any focal deficits.   SKIN: No rashes, ecchymosis, or lacerations  PSYCH: Calm and cooperative, interactive.     ED Course        Procedures               Labs Ordered and Resulted from Time of ED Arrival Up to the Time " of Departure from the ED   CBC WITH PLATELETS DIFFERENTIAL - Abnormal; Notable for the following components:       Result Value    RBC Count 4.30 (*)     Hematocrit 38.1 (*)     All other components within normal limits   BASIC METABOLIC PANEL - Abnormal; Notable for the following components:    Sodium 131 (*)     Potassium 3.3 (*)     Glucose 529 (*)     Creatinine 0.62 (*)     All other components within normal limits   BLOOD GAS VENOUS - Abnormal; Notable for the following components:    PO2 Venous 72 (*)     All other components within normal limits   GLUCOSE BY METER - Abnormal; Notable for the following components:    Glucose 434 (*)     All other components within normal limits            Assessments & Plan (with Medical Decision Making)   52-year-old male with multiple medical problems presenting with hyperglycemia likely related to insulin noncompliance as described.    Differential diagnosis includes DKA, hyperglycemia, occult infection, among other problems.  His history leans towards noncompliance with his diabetic regimen.    Laboratories are reassuring showing hyperglycemia but no anion gap.  Patient had received a liter of saline while waiting for laboratory studies results.  He has access to his glucometer as well as all his insulin, running low on Lantus which is prescribed him now.    Blood sugar now in the 400s.  We are waiting 8 units of subcutaneous Humalog to be administered before his discharge.  In the meantime will receive 1 further liter of normal saline.    Patient signed out to the morning provider.  He is safe to discharge once he receives his insulin and is monitored for 30 minutes with a repeat blood sugar.  If stable and glucose values appropriate he will be discharged with strict ED return precautions.  Patient is agreeable with this plan.    - Patient agrees to our plan and is ready and eager for discharge. Care plan, follow up plan, and reasons to return immediately to the ED were  dicussed in detail and summarized as noted in the discharge instructions.        I have reviewed the nursing notes.    I have reviewed the findings, diagnosis, plan and need for follow up with the patient.    Current Discharge Medication List          Final diagnoses:   Hyperglycemia   Dehydration       2/3/2020   [facility], [place], EMERGENCY DEPARTMENT     Chevy Bernal MD  02/03/20 0623

## 2020-02-03 NOTE — ED AVS SNAPSHOT
[facility], [place], Emergency Department  [address] [place] [address]  Phone:  [phone]                                    Andrew Lockwood   MRN: [id number]    Department:  [facility], [place], Emergency Department   Date of Visit:  2/3/2020           After Visit Summary Signature Page    I have received my discharge instructions, and my questions have been answered. I have discussed any challenges I see with this plan with the nurse or doctor.    ..........................................................................................................................................  Patient/Patient Representative Signature      ..........................................................................................................................................  Patient Representative Print Name and Relationship to Patient    ..................................................               ................................................  Date                                   Time    ..........................................................................................................................................  Reviewed by Signature/Title    ...................................................              ..............................................  Date                                               Time          22EPIC Rev 08/18

## 2020-02-03 NOTE — ED PROVIDER NOTES
[facility] ([facility])  2/03/20 Vertical Triage A  History     Chief Complaint   Patient presents with     Hyperglycemia     The history is provided by the patient and medical records.     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  He is well-known to the emergency department for multiple presentations.  He had recent hospitalization from 1/14-1/17/2020 for partial small bowel obstruction which was treated conservatively.  He was subsequently seen in the emergency department earlier today at around 4 AM with hyperglycemia.  He had lost his glucometer and was unable to give himself the appropriate amount of insulin.  He had a blood sugar of 529 in the ED though no anion gap.  He received a liter of saline, was given a prescription for Lantus as well as 8 units of Humalog.  He was subsequently discharged home with improved blood glucose. Unfortunately he was unable to fill that prescription for Lantus. He went to the pharmacy today and was told that he could not fill this until tomorrow.  He had repeat blood sugar of 572 at home.  He gave himself 11 units of short acting insulin and presents the ER now with elevated blood sugars.  States that he gave himself the short acting insulin approximately 1 hour prior to arrival.  He states he has a mild left-sided headache which she gets when his blood sugar is high.  Patient states his headache is located on the left temporal region of the head. Patient also endorses left lower quadrant abdominal pain began while he was here in the ED.  He states that this usually happens when his blood sugar gets high.  Patient states he had a normal bowel movement yesterday, and denies  any constipation.  Patient states he has been passing gas today as well.  He denies any nausea, vomiting, numbness, or tingling.  He denies any fevers, cough, chest pain, or shortness of breath.  Patient states he has not eaten anything today, but did drink a diet soda just prior to checking his blood sugar.  He denies any recent falls or trauma.  He states he now has his glucometer as this was given to him during his last ED visit.  He is just concerned that he has run out of his Lantus.  He states he did take the 30 units of Lantus this morning at 10 AM.  He is supposed to take it twice daily.  This was recently changed during his hospitalization.  He states he typically takes 7 to 8 units of short acting insulin with meals.    Past Medical History:   Diagnosis Date     AIDS (H)      Allergic rhinitis due to other allergen     DNS     Chronic abdominal pain      CNS toxoplasmosis (H)      Diabetes type 2, controlled (H)      GERD (gastroesophageal reflux disease)      HIV (human immunodeficiency virus infection) (H)      Periungual wart      Sleep apnea     doesn't use CPAP       Past Surgical History:   Procedure Laterality Date     C NONSPECIFIC PROCEDURE      right forearm fracture     COLONOSCOPY Left 1/22/2016    Procedure: COMBINED COLONOSCOPY, SINGLE OR MULTIPLE BIOPSY/POLYPECTOMY BY BIOPSY;  Surgeon: Clark Saini MD;  Location:  GI     HC EXPLORE UNDESC TESTIS,INGUIN/SCROTAL       LAPAROSCOPIC APPENDECTOMY N/A 1/31/2018    Procedure: LAPAROSCOPIC APPENDECTOMY;  LAPAROSCOPIC APPENDECTOMY;  Surgeon: Dawn Holt MD;  Location: [facility] OR     LAPAROSCOPY DIAGNOSTIC (GENERAL) N/A 7/26/2016    Procedure: LAPAROSCOPY DIAGNOSTIC (GENERAL);  Surgeon: Susannah Arriaga MD;  Location: [facility] OR     LAPAROSCOPY DIAGNOSTIC (GENERAL) N/A 4/16/2018    Procedure: LAPAROSCOPY DIAGNOSTIC (GENERAL);  Diagnostic laparoscopy and lysis of adhesions;  Surgeon: Prince Dowling MD;  Location:  OR      OPTICAL TRACKING SYSTEM CRANIOTOMY, EXCISE TUMOR, COMBINED Left 4/10/2015    Procedure: COMBINED OPTICAL TRACKING SYSTEM CRANIOTOMY, EXCISE TUMOR;  Surgeon: Mirlande Colmenares MD;  Location: [facility] OR     REPAIR GAMEKEEPER'S THUMB Right 12/2/2016    Procedure: REPAIR LIGAMENT ULNAR COLLATERAL THUMB (GAMEKEEPER'S);  Surgeon: Evin Zamorano MD;  Location: [facility] OR       Family History   Problem Relation Age of Onset     Diabetes Brother      Diabetes Father      Alzheimer Disease Father      Unknown/Adopted Mother      Diabetes Paternal Grandfather      Cancer No family hx of         no skin cancer     Skin Cancer No family hx of         no famiy hx of skin cancer     Glaucoma No family hx of      Macular Degeneration No family hx of        Social History     Tobacco Use     Smoking status: Current Some Day Smoker     Packs/day: 0.25     Types: Cigarettes     Last attempt to quit: 10/28/2016     Years since quitting: 3.2     Smokeless tobacco: Former User   Substance Use Topics     Alcohol use: No     Alcohol/week: 0.0 standard drinks     Comment: Last etoh in 2007       Current Facility-Administered Medications   Medication     0.9% sodium chloride BOLUS    Followed by     sodium chloride 0.9% infusion     0.9% sodium chloride BOLUS     ketorolac (TORADOL) injection 15 mg     lidocaine (PF) (XYLOCAINE) 1 % injection 4 mL     triamcinolone (KENALOG-40) injection 40 mg     Current Outpatient Medications   Medication     bictegravir-emtricitabine-tenofovir (BIKTARVY) -25 MG per tablet     blood glucose (NO BRAND SPECIFIED) lancets standard     blood glucose (NO BRAND SPECIFIED) test strip     blood glucose monitoring (NO BRAND SPECIFIED) meter device kit     blood glucose monitoring (NO BRAND SPECIFIED) meter device kit     cetirizine (ZYRTEC) 10 MG tablet     famotidine (PEPCID) 20 MG tablet     ibuprofen (ADVIL/MOTRIN) 200 MG tablet     insulin aspart (NOVOLOG PEN) 100 UNIT/ML pen     insulin glargine (LANTUS  PEN) 100 UNIT/ML pen     insulin pen needle (B-D U/F) 31G X 8 MM miscellaneous     insulin pen needle (BD PEN NEEDLE SCOTT 2ND GEN) 32G X 4 MM miscellaneous     ondansetron (ZOFRAN-ODT) 4 MG ODT tab        Allergies   Allergen Reactions     Metformin      Abdominal pain     Milk [Lac Bovis] Hives     Dulaglutide Rash        Past Medical History:   Diagnosis Date     AIDS (H)      Allergic rhinitis due to other allergen     DNS     Chronic abdominal pain      CNS toxoplasmosis (H)      Diabetes type 2, controlled (H)      GERD (gastroesophageal reflux disease)      HIV (human immunodeficiency virus infection) (H)      Periungual wart      Sleep apnea     doesn't use CPAP       Past Surgical History:   Procedure Laterality Date     C NONSPECIFIC PROCEDURE      right forearm fracture     COLONOSCOPY Left 1/22/2016    Procedure: COMBINED COLONOSCOPY, SINGLE OR MULTIPLE BIOPSY/POLYPECTOMY BY BIOPSY;  Surgeon: Clark Saini MD;  Location: [facility] GI     HC EXPLORE UNDESC TESTIS,INGUIN/SCROTAL       LAPAROSCOPIC APPENDECTOMY N/A 1/31/2018    Procedure: LAPAROSCOPIC APPENDECTOMY;  LAPAROSCOPIC APPENDECTOMY;  Surgeon: Dawn Holt MD;  Location: [facility] OR     LAPAROSCOPY DIAGNOSTIC (GENERAL) N/A 7/26/2016    Procedure: LAPAROSCOPY DIAGNOSTIC (GENERAL);  Surgeon: Susannah Arriaga MD;  Location: [facility] OR     LAPAROSCOPY DIAGNOSTIC (GENERAL) N/A 4/16/2018    Procedure: LAPAROSCOPY DIAGNOSTIC (GENERAL);  Diagnostic laparoscopy and lysis of adhesions;  Surgeon: Prince Dowling MD;  Location: [facility] OR     OPTICAL TRACKING SYSTEM CRANIOTOMY, EXCISE TUMOR, COMBINED Left 4/10/2015    Procedure: COMBINED OPTICAL TRACKING SYSTEM CRANIOTOMY, EXCISE TUMOR;  Surgeon: Mirlande Colmenares MD;  Location: [facility] OR     REPAIR GAMEKEEPER'S THUMB Right 12/2/2016    Procedure: REPAIR LIGAMENT ULNAR COLLATERAL THUMB (GAMEKEEPER'S);  Surgeon: Evin Zamorano MD;  Location: [facility]       Family History   Problem Relation Age of  "Onset     Diabetes Brother      Diabetes Father      Alzheimer Disease Father      Unknown/Adopted Mother      Diabetes Paternal Grandfather      Cancer No family hx of         no skin cancer     Skin Cancer No family hx of         no famiy hx of skin cancer     Glaucoma No family hx of      Macular Degeneration No family hx of        Social History     Tobacco Use     Smoking status: Current Some Day Smoker     Packs/day: 0.25     Types: Cigarettes     Last attempt to quit: 10/28/2016     Years since quitting: 3.2     Smokeless tobacco: Former User   Substance Use Topics     Alcohol use: No     Alcohol/week: 0.0 standard drinks     Comment: Last etoh in 2007      Review of Systems  Pertinent positives and negatives are documented in the HPI. All other systems reviewed and are negative.  Physical Exam   BP: 130/71  Pulse: 98  Temp: 98.4  F (36.9  C)  Resp: 16  Height: 162.6 cm (5' 4\")  Weight: 77.7 kg (171 lb 3.2 oz)(scale)  SpO2: 98 %      Physical Exam  Vitals signs reviewed.   Constitutional:       General: He is not in acute distress.     Appearance: He is well-developed.   HENT:      Head: Normocephalic and atraumatic.      Nose: Nose normal.      Mouth/Throat:      Mouth: Mucous membranes are moist.      Pharynx: No oropharyngeal exudate or posterior oropharyngeal erythema.   Eyes:      Extraocular Movements: Extraocular movements intact.      Conjunctiva/sclera: Conjunctivae normal.      Pupils: Pupils are equal, round, and reactive to light.   Neck:      Musculoskeletal: Normal range of motion and neck supple.   Cardiovascular:      Rate and Rhythm: Normal rate and regular rhythm.      Pulses: Normal pulses.      Heart sounds: Normal heart sounds. No murmur.   Pulmonary:      Effort: Pulmonary effort is normal. No respiratory distress.      Breath sounds: Normal breath sounds. No stridor. No wheezing or rales.   Abdominal:      General: Bowel sounds are normal. There is no distension.      Palpations: " Abdomen is soft. There is no mass.      Tenderness: There is no right CVA tenderness, left CVA tenderness, guarding or rebound.      Comments: Mild left lower quadrant newness to palpation.  No rebound or guarding.  No masses.  No tenderness in the right lower abdomen.  No tenderness in the upper abdomen.   Musculoskeletal: Normal range of motion.         General: No swelling or tenderness.   Skin:     General: Skin is warm and dry.      Capillary Refill: Capillary refill takes less than 2 seconds.      Findings: No rash.   Neurological:      General: No focal deficit present.      Mental Status: He is alert and oriented to person, place, and time.      GCS: GCS eye subscore is 4. GCS verbal subscore is 5. GCS motor subscore is 6.      Cranial Nerves: No cranial nerve deficit.      Sensory: No sensory deficit.      Motor: No weakness or abnormal muscle tone.      Gait: Gait normal.   Psychiatric:         Mood and Affect: Mood normal.         ED Course   4:25 PM  The patient was seen and examined by Mirian Perez MD in Room ED VTA.    Medications   0.9% sodium chloride BOLUS (has no administration in time range)     Followed by   sodium chloride 0.9% infusion (has no administration in time range)   0.9% sodium chloride BOLUS (has no administration in time range)   ketorolac (TORADOL) injection 15 mg (has no administration in time range)         Procedures            Critical Care time:  none             Labs Ordered and Resulted from Time of ED Arrival Up to the Time of Departure from the ED   GLUCOSE BY METER - Abnormal; Notable for the following components:       Result Value    Glucose 353 (*)     All other components within normal limits   ISTAT GASES ELEC ICA GLUC AMNA POCT - Abnormal; Notable for the following components:    PCO2 Venous 38 (*)     PO2 Venous 70 (*)     Glucose 400 (*)     Calcium Ionized 5.3 (*)     All other components within normal limits   BASIC METABOLIC PANEL   KETONE  BETA-HYDROXYBUTYRATE QUANTITATIVE   ROUTINE UA WITH MICROSCOPIC   ISTAT CG8 GAS ELEC ICA GLUC ERIBERTO NURSE POCT     Results for orders placed or performed during the hospital encounter of 02/03/20 (from the past 24 hour(s))   Glucose by meter   Result Value Ref Range    Glucose 353 (H) 70 - 99 mg/dL   ISTAT gases elec ica gluc eriberto POCT   Result Value Ref Range    Ph Venous 7.36 7.32 - 7.43 pH    PCO2 Venous 38 (L) 40 - 50 mm Hg    PO2 Venous 70 (H) 25 - 47 mm Hg    Bicarbonate Venous 21 21 - 28 mmol/L    O2 Sat Venous 93 %    Sodium 134 133 - 144 mmol/L    Potassium 4.1 3.4 - 5.3 mmol/L    Glucose 400 (H) 70 - 99 mg/dL    Calcium Ionized 5.3 (H) 4.4 - 5.2 mg/dL    Hemoglobin 13.9 13.3 - 17.7 g/dL    Hematocrit - POCT 41 40.0 - 53.0 %PCV     *Note: Due to a large number of results and/or encounters for the requested time period, some results have not been displayed. A complete set of results can be found in Results Review.            Assessments & Plan (with Medical Decision Making)   On exam, patient is overall well-appearing and in no acute distress.  His vital signs here are within normal limits. His blood sugar here on arrival is 388.  He was reporting it to be in the 500s approximately an hour prior to arrival when he took 11 units of his Humalog.  He is a well-known patient here in the emergency department with frequent presentations.  He was just seen for hyperglycemia earlier this morning and discharged around 9 AM.  He now has a glucometer and has been checking his sugar at home and that is why he presented because it was still high.  He does state that he took his Lantus this morning but is now out of this and is mostly taking 30 units twice daily.  We did obtain laboratory studies to evaluate for evidence of DKA.  Also on exam he is complaining of some left lower quadrant abdominal pain that started while he was here in the emergency department.  He was recently admitted 2 weeks ago for chronic small bowel  obstruction and was treated conservatively.  He has mild tenderness in the left lower quadrant without signs of peritonitis.  As he is again experiencing this pain we will repeat a CT scan of the abdomen and pelvis for further evaluation.  Initial lactic acid is 2.1 however this is likely related to dehydration with his hyperglycemia and the fact that he has not eaten or drank much today.  We did give him 2 L boluses of normal saline and recheck the lactic acid which is now normal at 1.4.  He was given Toradol with improvement.  His UA revealed glucose without evidence of UTI or hematuria and no ketones.  VBG revealed a pH of 7.36 with a PCO2 of 38 and a bicarb of 21.  Glucose was 400 on this.  He is mildly hyponatremic with a sodium of 131 with his glucose of 394 and a basic metabolic panel.  Creatinine is within normal limits.  CBC is also largely unremarkable with white blood cell count of 8.6 and a hemoglobin of 13.8.  Beta hydroxybutyrate ketones are 0.  CT the abdomen pelvis revealed Questionable wall thickening of the rectosigmoid colon although this is likely due to degree of distention of this region without adjacent inflammation and no evidence of acute diverticulitis.  He has chronic changes of esophagitis likely related to his known reflux.  No signs of acute pneumonia.  There was some slight wall thickening of the bladder however he does not have any signs of cystitis in his urinalysis.  On reevaluation the patient reported that he was entirely improved and did not have any tenderness on his rosalio reexamination.  At this point I have low suspicion that this is true colitis and I do not see any signs of infection here today especially with his normal white blood cell count.  I do feel that his hyperglycemia is likely related to poor compliance with his medications. I Question whether he truly did take his Lantus this morning despite him telling me this.     Blood sugars remained in the 300s with recheck  being 368.  I discussed with the patient and the potential for ED opts today for further treatment of his hyperglycemia however he declined this stating that he would prefer to go back to his treatment facility and could monitor his blood sugar closely at home now that he has his glucometer.  We discussed giving 5 additional units of Humalog here and further monitoring.  He was given these 5 units and his blood sugar improved to 197.  He was again feeling improved and was hemodynamically stable here without any complaints.  He requested discharge to home at this time.  We discussed that he should follow his blood sugar closely checking it each hour until he goes to bed tonight.  He was also advised he needed to eat food and was eating here in the emergency department.  I did speak with the pharmacy about getting his 30 units of Lantus twice daily refilled here and they were able to do this and also provided him additional pen needles.  He was advised to take this as prescribed.  He does have close outpatient follow-up within the next week with his primary care doctor and endocrinologist.  We discussed the importance of this follow-up.  He was given indications for return including elevated blood sugar, vomiting, worsening abdominal pain, fever, lack of bowel movement, chest pain, shortness of breath, cough, or any new or worsening concerns.  He voiced understanding was comfortable with this plan.  He was discharged in stable condition back to his treatment facility.    I have reviewed the nursing notes.    I have reviewed the findings, diagnosis, plan and need for follow up with the patient.    New Prescriptions    No medications on file       Final diagnoses:   Hyperglycemia   Ruel KELLY, am serving as a trained medical scribe to document services personally performed by Mirian Perez MD, based on the provider's statements to me.      Mirian KELLY MD, was physically present and have reviewed  and verified the accuracy of this note documented by Ruel Vasquez.      2/3/2020   [facility], [place], EMERGENCY DEPARTMENT     Mirian Perez MD  02/04/20 0036

## 2020-02-03 NOTE — DISCHARGE INSTRUCTIONS
It is critical that you continue to take your diabetes medications as you are instructed.  This is very dangerous when your blood sugar is not in control either too high or too low.  Please continue to use her monitor, do not lose it, pay attention to where it is and keep it safe.  Use both her Lantus and Humalog as discussed by your doctor during your most recent visit:    Lantus 30 units twice daily  Humalog 7 units with meals    Return to the emergency department immediately for any chest pain, back pain, shortness of breath, weakness, abdominal pain nausea, vomiting, or any other concerns as given or discussed

## 2020-02-03 NOTE — ED TRIAGE NOTES
Patient presents to triage with c/o hyperglycemia. Patient reports BG of 572 about an hour prior to arrival,  during triage. Patient states he was unable to fill prescription for long acting insulin that was prescribed during earlier ED visit.  Patient states he administered 11 units of short acting insulin prior to arrival. Patient c/o blurred vision and headache.

## 2020-02-03 NOTE — ED NOTES
"ED Triage Provider Note  [facility]  Encounter Date: Feb 3, 2020    History:  Chief Complaint   Patient presents with     Hyperglycemia     Andrew Lockwood is a 52 year old male with a history of DM, AIDS, and recent bowel obstruction  who presents to the ED with hyperglycemia.  Patient was here this morning for hyperglycemia and felt to be due to medication non-compliance.  He does report taking his insulin currently.  ON chart review, appears insulin was changed during recent hospitalization.  No fevers, CP, SOB, Abdominal pain, vomiting or diarrhea.  Recent cortisone injection in shoulder without pain or redness.  Does endorse a headache.      Review of Systems:  Review Of Systems  Respiratory: No shortness of breath, dyspnea on exertion, cough, or hemoptysis  Cardiovascular: negative  Gastrointestinal: negative      Exam:  /71   Pulse 98   Temp 98.4  F (36.9  C) (Oral)   Resp 16   Ht 1.626 m (5' 4\")   Wt 77.7 kg (171 lb 3.2 oz)   SpO2 98%   BMI 29.39 kg/m    General: No acute distress. Appears stated age.   Cardio: Regular rate, extremities well perfused  Resp: Normal work of breathing, grossly normal respiratory rate  Neuro: Alert. CN II-XII grossly intact. Grossly intact strength.       Medical Decision Making:  Patient arriving to the ED with problem as above. A medical screening exam was performed. BMP, ketones, VBG, IVF orders initiated from Triage. Hyperglycemic without evidence of DKA.  The patient is appropriate to wait in triage.      Sharlene Tobin MD on 2/3/2020 at 4:22 PM     Sharlene Tobin MD  02/03/20 1625    "

## 2020-02-04 NOTE — DISCHARGE INSTRUCTIONS
Check your blood sugar every hour until you go to bed. Take your lantus as prescribed. Check your blood sugar 4 times a day and with meals and use your insulin as prescribed. Follow up with your primary doctor and your endocrinologist as soon as possible.     Return to the ED for any new or worsening concerns including elevated blood sugar, vomiting, worsening abdominal pain, fever, lack of bowel movement, chest pain, shortness of breath, cough, or any new or worsening concerns.

## 2020-02-07 ENCOUNTER — APPOINTMENT (OUTPATIENT)
Dept: CT IMAGING | Facility: CLINIC | Age: 55
End: 2020-02-07
Attending: EMERGENCY MEDICINE
Payer: COMMERCIAL

## 2020-02-07 ENCOUNTER — HOSPITAL ENCOUNTER (EMERGENCY)
Facility: CLINIC | Age: 55
Discharge: HOME | End: 2020-02-07
Attending: EMERGENCY MEDICINE | Admitting: EMERGENCY MEDICINE
Payer: COMMERCIAL

## 2020-02-07 VITALS
HEART RATE: 80 BPM | HEIGHT: 64 IN | RESPIRATION RATE: 18 BRPM | TEMPERATURE: 98.3 F | BODY MASS INDEX: 29.39 KG/M2 | DIASTOLIC BLOOD PRESSURE: 89 MMHG | OXYGEN SATURATION: 100 % | SYSTOLIC BLOOD PRESSURE: 132 MMHG

## 2020-02-07 DIAGNOSIS — R73.9 HYPERGLYCEMIA: ICD-10-CM

## 2020-02-07 DIAGNOSIS — R10.32 LLQ ABDOMINAL PAIN: ICD-10-CM

## 2020-02-07 DIAGNOSIS — K59.00 CONSTIPATION, UNSPECIFIED CONSTIPATION TYPE: ICD-10-CM

## 2020-02-07 DIAGNOSIS — Z79.4 ENCOUNTER FOR LONG-TERM (CURRENT) USE OF INSULIN (H): ICD-10-CM

## 2020-02-07 LAB
ALBUMIN SERPL-MCNC: 3.6 G/DL (ref 3.4–5)
ALBUMIN UR-MCNC: NEGATIVE MG/DL
ALP SERPL-CCNC: 168 U/L (ref 40–150)
ALT SERPL W P-5'-P-CCNC: 23 U/L (ref 0–70)
ANION GAP SERPL CALCULATED.3IONS-SCNC: 6 MMOL/L (ref 3–14)
APPEARANCE UR: CLEAR
AST SERPL W P-5'-P-CCNC: 15 U/L (ref 0–45)
BASE DEFICIT BLDV-SCNC: 0.1 MMOL/L
BASOPHILS # BLD AUTO: 0.1 10E9/L (ref 0–0.2)
BASOPHILS NFR BLD AUTO: 0.9 %
BILIRUB SERPL-MCNC: 0.6 MG/DL (ref 0.2–1.3)
BILIRUB UR QL STRIP: NEGATIVE
BUN SERPL-MCNC: 15 MG/DL (ref 7–30)
CALCIUM SERPL-MCNC: 9.1 MG/DL (ref 8.5–10.1)
CHLORIDE SERPL-SCNC: 105 MMOL/L (ref 94–109)
CO2 SERPL-SCNC: 25 MMOL/L (ref 20–32)
COLOR UR AUTO: ABNORMAL
CREAT SERPL-MCNC: 0.68 MG/DL (ref 0.66–1.25)
DIFFERENTIAL METHOD BLD: NORMAL
EOSINOPHIL # BLD AUTO: 0.3 10E9/L (ref 0–0.7)
EOSINOPHIL NFR BLD AUTO: 3.3 %
ERYTHROCYTE [DISTWIDTH] IN BLOOD BY AUTOMATED COUNT: 13.7 % (ref 10–15)
GFR SERPL CREATININE-BSD FRML MDRD: >90 ML/MIN/{1.73_M2}
GLUCOSE BLDC GLUCOMTR-MCNC: 155 MG/DL (ref 70–99)
GLUCOSE SERPL-MCNC: 330 MG/DL (ref 70–99)
GLUCOSE UR STRIP-MCNC: >1000 MG/DL
HCO3 BLDV-SCNC: 25 MMOL/L (ref 21–28)
HCT VFR BLD AUTO: 40.4 % (ref 40–53)
HGB BLD-MCNC: 13.7 G/DL (ref 13.3–17.7)
HGB UR QL STRIP: NEGATIVE
IMM GRANULOCYTES # BLD: 0.1 10E9/L (ref 0–0.4)
IMM GRANULOCYTES NFR BLD: 1.8 %
KETONES UR STRIP-MCNC: NEGATIVE MG/DL
LACTATE BLD-SCNC: 1.6 MMOL/L (ref 0.7–2)
LEUKOCYTE ESTERASE UR QL STRIP: NEGATIVE
LIPASE SERPL-CCNC: 209 U/L (ref 73–393)
LYMPHOCYTES # BLD AUTO: 2.6 10E9/L (ref 0.8–5.3)
LYMPHOCYTES NFR BLD AUTO: 34.5 %
MAGNESIUM SERPL-MCNC: 1.9 MG/DL (ref 1.6–2.3)
MCH RBC QN AUTO: 31 PG (ref 26.5–33)
MCHC RBC AUTO-ENTMCNC: 33.9 G/DL (ref 31.5–36.5)
MCV RBC AUTO: 91 FL (ref 78–100)
MONOCYTES # BLD AUTO: 0.5 10E9/L (ref 0–1.3)
MONOCYTES NFR BLD AUTO: 6.3 %
NEUTROPHILS # BLD AUTO: 4 10E9/L (ref 1.6–8.3)
NEUTROPHILS NFR BLD AUTO: 53.2 %
NITRATE UR QL: NEGATIVE
NRBC # BLD AUTO: 0 10*3/UL
NRBC BLD AUTO-RTO: 0 /100
O2/TOTAL GAS SETTING VFR VENT: 21 %
PCO2 BLDV: 41 MM HG (ref 40–50)
PH BLDV: 7.4 PH (ref 7.32–7.43)
PH UR STRIP: 6 PH (ref 5–7)
PHOSPHATE SERPL-MCNC: 3 MG/DL (ref 2.5–4.5)
PLATELET # BLD AUTO: 299 10E9/L (ref 150–450)
PO2 BLDV: 84 MM HG (ref 25–47)
POTASSIUM SERPL-SCNC: 4 MMOL/L (ref 3.4–5.3)
PROT SERPL-MCNC: 7.1 G/DL (ref 6.8–8.8)
RBC # BLD AUTO: 4.42 10E12/L (ref 4.4–5.9)
SODIUM SERPL-SCNC: 136 MMOL/L (ref 133–144)
SOURCE: ABNORMAL
SP GR UR STRIP: 1.03 (ref 1–1.03)
UROBILINOGEN UR STRIP-MCNC: NORMAL MG/DL (ref 0–2)
WBC # BLD AUTO: 7.6 10E9/L (ref 4–11)

## 2020-02-07 PROCEDURE — 74177 CT ABD & PELVIS W/CONTRAST: CPT

## 2020-02-07 PROCEDURE — 96360 HYDRATION IV INFUSION INIT: CPT | Mod: 59

## 2020-02-07 PROCEDURE — 80053 COMPREHEN METABOLIC PANEL: CPT | Performed by: EMERGENCY MEDICINE

## 2020-02-07 PROCEDURE — 83735 ASSAY OF MAGNESIUM: CPT | Performed by: EMERGENCY MEDICINE

## 2020-02-07 PROCEDURE — 00000146 ZZHCL STATISTIC GLUCOSE BY METER IP

## 2020-02-07 PROCEDURE — 85025 COMPLETE CBC W/AUTO DIFF WBC: CPT | Performed by: EMERGENCY MEDICINE

## 2020-02-07 PROCEDURE — 83690 ASSAY OF LIPASE: CPT | Performed by: EMERGENCY MEDICINE

## 2020-02-07 PROCEDURE — 25000128 H RX IP 250 OP 636: Performed by: EMERGENCY MEDICINE

## 2020-02-07 PROCEDURE — 96361 HYDRATE IV INFUSION ADD-ON: CPT

## 2020-02-07 PROCEDURE — 83605 ASSAY OF LACTIC ACID: CPT | Performed by: EMERGENCY MEDICINE

## 2020-02-07 PROCEDURE — 25800030 ZZH RX IP 258 OP 636: Performed by: EMERGENCY MEDICINE

## 2020-02-07 PROCEDURE — 84100 ASSAY OF PHOSPHORUS: CPT | Performed by: EMERGENCY MEDICINE

## 2020-02-07 PROCEDURE — 99285 EMERGENCY DEPT VISIT HI MDM: CPT | Mod: 25

## 2020-02-07 PROCEDURE — 82803 BLOOD GASES ANY COMBINATION: CPT | Performed by: EMERGENCY MEDICINE

## 2020-02-07 PROCEDURE — 81003 URINALYSIS AUTO W/O SCOPE: CPT | Performed by: EMERGENCY MEDICINE

## 2020-02-07 PROCEDURE — 99284 EMERGENCY DEPT VISIT MOD MDM: CPT | Mod: Z6 | Performed by: EMERGENCY MEDICINE

## 2020-02-07 RX ORDER — ONDANSETRON 2 MG/ML
4 INJECTION INTRAMUSCULAR; INTRAVENOUS EVERY 30 MIN PRN
Status: DISCONTINUED | OUTPATIENT
Start: 2020-02-07 | End: 2020-02-07 | Stop reason: HOSPADM

## 2020-02-07 RX ORDER — IOPAMIDOL 755 MG/ML
105 INJECTION, SOLUTION INTRAVASCULAR ONCE
Status: COMPLETED | OUTPATIENT
Start: 2020-02-07 | End: 2020-02-07

## 2020-02-07 RX ORDER — MORPHINE SULFATE 4 MG/ML
4 INJECTION, SOLUTION INTRAMUSCULAR; INTRAVENOUS
Status: DISCONTINUED | OUTPATIENT
Start: 2020-02-07 | End: 2020-02-07 | Stop reason: HOSPADM

## 2020-02-07 RX ORDER — SENNA AND DOCUSATE SODIUM 50; 8.6 MG/1; MG/1
1 TABLET, FILM COATED ORAL AT BEDTIME
Qty: 7 TABLET | Refills: 0 | Status: ON HOLD | OUTPATIENT
Start: 2020-02-07 | End: 2020-02-26

## 2020-02-07 RX ADMIN — IOPAMIDOL 105 ML: 755 INJECTION, SOLUTION INTRAVENOUS at 13:23

## 2020-02-07 RX ADMIN — SODIUM CHLORIDE 1000 ML: 9 INJECTION, SOLUTION INTRAVENOUS at 11:02

## 2020-02-07 RX ADMIN — SODIUM CHLORIDE 1000 ML: 900 INJECTION, SOLUTION INTRAVENOUS at 13:13

## 2020-02-07 ASSESSMENT — ENCOUNTER SYMPTOMS
DYSURIA: 0
ABDOMINAL DISTENTION: 1
CONSTIPATION: 1
DIARRHEA: 0
NAUSEA: 1
ABDOMINAL PAIN: 1
BLOOD IN STOOL: 0
VOMITING: 0
FEVER: 0

## 2020-02-07 NOTE — ED PROVIDER NOTES
[place] EMERGENCY DEPARTMENT ([facility])  2/07/20 ED 5    History     Chief Complaint   Patient presents with     Abdominal Pain     The history is provided by the patient and medical records.     Andrew Lockwood is a 52 year old male with history of HIV (last CD4 count was 438 on 1/7/2020), type II diabetes, prior appendectomy, recurrent SBOs who presents with abdominal pain, constipation and absence of flatus for the past 7 hours.  He states that he had a BM at 4 AM today and has not had any further stool or passage of flatus. He states this abdominal pain worse than last time he was seen in this ED with a CT scan that should inflammation.  It is located mostly on the left lower side.  He denies dysuria.  Denies vomiting or blood in his stool.  He states he has been taking his diabetes medications as directed, but had a recent cortisone shot to his right shoulder joint and his blood sugar has been high since. Denies blood in his stool or dysuria. Has nausea but no vomiting.  Other history is limited by patient irritability and poor cooperation.  He requests that I review his chart to obtain information.      I have reviewed the Medications, Allergies, Past Medical and Surgical History, and Social History in the [employer] system.    PAST MEDICAL HISTORY:   Past Medical History:   Diagnosis Date     AIDS (H)      Allergic rhinitis due to other allergen     DNS     Chronic abdominal pain      CNS toxoplasmosis (H)      Diabetes type 2, controlled (H)      GERD (gastroesophageal reflux disease)      HIV (human immunodeficiency virus infection) (H)      Periungual wart      Sleep apnea     doesn't use CPAP       PAST SURGICAL HISTORY:   Past Surgical History:   Procedure Laterality Date     C NONSPECIFIC PROCEDURE      right forearm fracture     COLONOSCOPY Left 1/22/2016    Procedure: COMBINED COLONOSCOPY, SINGLE OR MULTIPLE BIOPSY/POLYPECTOMY BY BIOPSY;  Surgeon: Clark Saini MD;  Location:  GI      HC EXPLORE UNDESC TESTIS,INGUIN/SCROTAL       LAPAROSCOPIC APPENDECTOMY N/A 1/31/2018    Procedure: LAPAROSCOPIC APPENDECTOMY;  LAPAROSCOPIC APPENDECTOMY;  Surgeon: Dawn Holt MD;  Location: [facility] OR     LAPAROSCOPY DIAGNOSTIC (GENERAL) N/A 7/26/2016    Procedure: LAPAROSCOPY DIAGNOSTIC (GENERAL);  Surgeon: Susannah Arriaga MD;  Location: [facility] OR     LAPAROSCOPY DIAGNOSTIC (GENERAL) N/A 4/16/2018    Procedure: LAPAROSCOPY DIAGNOSTIC (GENERAL);  Diagnostic laparoscopy and lysis of adhesions;  Surgeon: Prince Dowling MD;  Location: [facility] OR     OPTICAL TRACKING SYSTEM CRANIOTOMY, EXCISE TUMOR, COMBINED Left 4/10/2015    Procedure: COMBINED OPTICAL TRACKING SYSTEM CRANIOTOMY, EXCISE TUMOR;  Surgeon: Mirlande Colmenares MD;  Location: [facility] OR     REPAIR GAMEKEEPER'S THUMB Right 12/2/2016    Procedure: REPAIR LIGAMENT ULNAR COLLATERAL THUMB (GAMEKEEPER'S);  Surgeon: Evin Zamorano MD;  Location: [facility] OR       FAMILY HISTORY:   Family History   Problem Relation Age of Onset     Diabetes Brother      Diabetes Father      Alzheimer Disease Father      Unknown/Adopted Mother      Diabetes Paternal Grandfather      Cancer No family hx of         no skin cancer     Skin Cancer No family hx of         no famiy hx of skin cancer     Glaucoma No family hx of      Macular Degeneration No family hx of        SOCIAL HISTORY:   Social History     Tobacco Use     Smoking status: Current Some Day Smoker     Packs/day: 0.25     Types: Cigarettes     Last attempt to quit: 10/28/2016     Years since quitting: 3.2     Smokeless tobacco: Former User   Substance Use Topics     Alcohol use: No     Alcohol/week: 0.0 standard drinks     Comment: Last etoh in 2007       Patient's Medications   New Prescriptions    SENNA-DOCUSATE SODIUM (SENNA S) 8.6-50 MG TABLET    Take 1 tablet by mouth At Bedtime for 7 days   Previous Medications    BICTEGRAVIR-EMTRICITABINE-TENOFOVIR (BIKTARVY) -25 MG PER TABLET    Take 1 tablet  by mouth daily    BLOOD GLUCOSE (NO BRAND SPECIFIED) LANCETS STANDARD    Use to test blood sugar four times daily or as directed.    BLOOD GLUCOSE (NO BRAND SPECIFIED) TEST STRIP    1 strip by In Vitro route 4 times daily (before meals and nightly) Use to test blood sugars 4 times daily or as directed    BLOOD GLUCOSE MONITORING (NO BRAND SPECIFIED) METER DEVICE KIT    Use to test blood sugar 4 times daily or as directed.    BLOOD GLUCOSE MONITORING (NO BRAND SPECIFIED) METER DEVICE KIT    Use to test blood sugar 4 times daily or as directed.    CETIRIZINE (ZYRTEC) 10 MG TABLET    Take 1 tablet (10 mg) by mouth daily    FAMOTIDINE (PEPCID) 20 MG TABLET    Take 1 tablet (20 mg) by mouth 2 times daily    IBUPROFEN (ADVIL/MOTRIN) 200 MG TABLET    Take 600 mg by mouth every 4 hours as needed for mild pain    INSULIN ASPART (NOVOLOG PEN) 100 UNIT/ML PEN    Inject 8 Units Subcutaneous 3 times daily (with meals)    INSULIN GLARGINE (LANTUS PEN) 100 UNIT/ML PEN    Inject 30 Units Subcutaneous 2 times daily    INSULIN PEN NEEDLE (B-D U/F) 31G X 8 MM MISCELLANEOUS    Use 1 pen needles daily or as directed.    INSULIN PEN NEEDLE (BD PEN NEEDLE SCOTT 2ND GEN) 32G X 4 MM MISCELLANEOUS    Use 3-4 daily.    ONDANSETRON (ZOFRAN-ODT) 4 MG ODT TAB    Take 1 tablet (4 mg) by mouth every 6 hours as needed for nausea or vomiting   Modified Medications    No medications on file   Discontinued Medications    No medications on file      Allergies   Allergen Reactions     Metformin      Abdominal pain     Milk [Lac Bovis] Hives     Dulaglutide Rash        Review of Systems   Unable to perform ROS: Other (Patient uncooperative)   Constitutional: Negative for fever.   Gastrointestinal: Positive for abdominal distention, abdominal pain, constipation and nausea. Negative for blood in stool, diarrhea and vomiting.   Endocrine:        Higher blood sugars   Genitourinary: Negative for dysuria.   All other systems reviewed and are  "negative.      Physical Exam   BP: 127/81  Heart Rate: 91  Temp: 98.3  F (36.8  C)  Resp: 18  Height: 162.6 cm (5' 4\")  SpO2: 99 %      Physical Exam  Vitals signs and nursing note reviewed.   Constitutional:       General: He is not in acute distress.     Appearance: Normal appearance. He is well-developed. He is not ill-appearing or diaphoretic.   HENT:      Head: Normocephalic and atraumatic.      Nose: Nose normal.      Mouth/Throat:      Mouth: Mucous membranes are dry.   Eyes:      General: No scleral icterus.     Conjunctiva/sclera: Conjunctivae normal.   Neck:      Musculoskeletal: Normal range of motion and neck supple.   Cardiovascular:      Rate and Rhythm: Normal rate.   Pulmonary:      Effort: Pulmonary effort is normal. No respiratory distress.      Breath sounds: Normal breath sounds. No stridor.   Abdominal:      General: Abdomen is protuberant. There is no distension.      Palpations: Abdomen is soft.      Tenderness: There is abdominal tenderness in the left lower quadrant. There is guarding. There is no rebound. Negative signs include Mendes's sign and McBurney's sign.      Hernia: No hernia is present.   Musculoskeletal: Normal range of motion.         General: No deformity or signs of injury.   Skin:     General: Skin is warm and dry.      Coloration: Skin is not jaundiced.      Findings: No rash.   Neurological:      General: No focal deficit present.      Mental Status: He is alert and oriented to person, place, and time.   Psychiatric:         Speech: Speech normal.         Behavior: Behavior normal.         Thought Content: Thought content normal.         ED Course        Procedures                           Results for orders placed or performed during the hospital encounter of 02/07/20 (from the past 24 hour(s))   CBC with platelets differential   Result Value Ref Range    WBC 7.6 4.0 - 11.0 10e9/L    RBC Count 4.42 4.4 - 5.9 10e12/L    Hemoglobin 13.7 13.3 - 17.7 g/dL    Hematocrit 40.4 " 40.0 - 53.0 %    MCV 91 78 - 100 fl    MCH 31.0 26.5 - 33.0 pg    MCHC 33.9 31.5 - 36.5 g/dL    RDW 13.7 10.0 - 15.0 %    Platelet Count 299 150 - 450 10e9/L    Diff Method Automated Method     % Neutrophils 53.2 %    % Lymphocytes 34.5 %    % Monocytes 6.3 %    % Eosinophils 3.3 %    % Basophils 0.9 %    % Immature Granulocytes 1.8 %    Nucleated RBCs 0 0 /100    Absolute Neutrophil 4.0 1.6 - 8.3 10e9/L    Absolute Lymphocytes 2.6 0.8 - 5.3 10e9/L    Absolute Monocytes 0.5 0.0 - 1.3 10e9/L    Absolute Eosinophils 0.3 0.0 - 0.7 10e9/L    Absolute Basophils 0.1 0.0 - 0.2 10e9/L    Abs Immature Granulocytes 0.1 0 - 0.4 10e9/L    Absolute Nucleated RBC 0.0    Comprehensive metabolic panel   Result Value Ref Range    Sodium 136 133 - 144 mmol/L    Potassium 4.0 3.4 - 5.3 mmol/L    Chloride 105 94 - 109 mmol/L    Carbon Dioxide 25 20 - 32 mmol/L    Anion Gap 6 3 - 14 mmol/L    Glucose 330 (H) 70 - 99 mg/dL    Urea Nitrogen 15 7 - 30 mg/dL    Creatinine 0.68 0.66 - 1.25 mg/dL    GFR Estimate >90 >60 mL/min/[1.73_m2]    GFR Estimate If Black >90 >60 mL/min/[1.73_m2]    Calcium 9.1 8.5 - 10.1 mg/dL    Bilirubin Total 0.6 0.2 - 1.3 mg/dL    Albumin 3.6 3.4 - 5.0 g/dL    Protein Total 7.1 6.8 - 8.8 g/dL    Alkaline Phosphatase 168 (H) 40 - 150 U/L    ALT 23 0 - 70 U/L    AST 15 0 - 45 U/L   Lipase   Result Value Ref Range    Lipase 209 73 - 393 U/L   Blood gas venous   Result Value Ref Range    Ph Venous 7.40 7.32 - 7.43 pH    PCO2 Venous 41 40 - 50 mm Hg    PO2 Venous 84 (H) 25 - 47 mm Hg    Bicarbonate Venous 25 21 - 28 mmol/L    Base Deficit Venous 0.1 mmol/L    FIO2 21    Lactic acid whole blood   Result Value Ref Range    Lactic Acid 1.6 0.7 - 2.0 mmol/L   Magnesium   Result Value Ref Range    Magnesium 1.9 1.6 - 2.3 mg/dL   Phosphorus   Result Value Ref Range    Phosphorus 3.0 2.5 - 4.5 mg/dL   UA reflex to Microscopic and Culture   Result Value Ref Range    Color Urine Light Yellow     Appearance Urine Clear      Glucose Urine >1000 (A) NEG^Negative mg/dL    Bilirubin Urine Negative NEG^Negative    Ketones Urine Negative NEG^Negative mg/dL    Specific Gravity Urine 1.028 1.003 - 1.035    Blood Urine Negative NEG^Negative    pH Urine 6.0 5.0 - 7.0 pH    Protein Albumin Urine Negative NEG^Negative mg/dL    Urobilinogen mg/dL Normal 0.0 - 2.0 mg/dL    Nitrite Urine Negative NEG^Negative    Leukocyte Esterase Urine Negative NEG^Negative    Source Catheterized Urine    CT Abdomen Pelvis w Contrast    Narrative    Exam: CT abdomen and pelvis with contrast    Comparison: 2/3/2020    History: Abdominal pain, diverticulitis suspected    Technique: CT of the abdomen and pelvis was obtained with intravenous  contrast. Coronal and sagittal reconstructions were obtained and  reviewed.    Contrast dose: iopamidol (ISOVUE-370) solution 105 mL    Findings:    Abdomen/pelvis: Hypoattenuation of the liver relative to the spleen.  The liver measures 18.8 cm. No suspicious hepatic lesion. Normal  spleen, gallbladder, pancreas, and adrenal glands. Symmetric  enhancement of the kidneys. No renal mass or cystic lesion. No  hydronephrosis or hydroureter. Bladder is partially distended and  unremarkable. Prostatomegaly. Normal caliber of the small and large  bowel. The appendix is not visualized. Scattered diverticula without  CT evidence of acute diverticulitis. Penile prosthesis with fluid  reservoir anterior to the bladder. Normal caliber of the infrarenal  aorta. No free air or free fluid in the abdomen or pelvis.    Lower chest: Decreased patchy groundglass opacities in the bases,  suggestive of resolving infection. No pleural effusion or  pneumothorax. Heart size is normal. No pericardial effusion.    Bones: No acute or suspicious appearing bony abnormalities.      Impression    Impression:   1. No acute findings in the abdomen or pelvis.  2. Diverticulosis without CT evidence of acute diverticulitis.  3. Decreased patchy ground glass  opacities in the lung bases,  suggestive of resolving infection.    I have personally reviewed the examination and initial interpretation  and I agree with the findings.    ALFREDA SHIPLEY MD   Glucose by meter   Result Value Ref Range    Glucose 155 (H) 70 - 99 mg/dL     *Note: Due to a large number of results and/or encounters for the requested time period, some results have not been displayed. A complete set of results can be found in Results Review.     Medications   0.9% sodium chloride BOLUS (0 mLs Intravenous Stopped 2/7/20 1312)     Followed by   0.9% sodium chloride BOLUS (has no administration in time range)   ondansetron (ZOFRAN) injection 4 mg (has no administration in time range)   morphine (PF) injection 4 mg (has no administration in time range)   iopamidol (ISOVUE-370) solution 105 mL (105 mLs Intravenous Given 2/7/20 1323)   sodium chloride (PF) 0.9% PF flush 75 mL (75 mLs Intravenous Given 2/7/20 1323)   0.9% sodium chloride BOLUS (0 mLs Intravenous Stopped 2/7/20 1448)       CT abd/pelv 02/03/20  1. Questionable wall thickening of the rectosigmoid colon (series 5,  image 340), although this is likely due to degree of distention of  this region. No adjacent inflammation. Diverticulosis with no evidence  of acute diverticulitis.  2. Chronic changes of esophagitis, likely gastroesophageal reflux  disease.  3. Slightly improved groundglass opacities in bronchial wall  thickening of the lung bases, likely sequelae of prior infection.  4. Circumferential wall thickening of the bladder is greater than  would be expected for degree of distention and may represent some  chronic outlet obstruction. Correlation with urinalysis if there is  concern for cystitis.  5. Additional findings as above.      CT Abdomen Pelvis w Contrast   Final Result   Impression:    1. No acute findings in the abdomen or pelvis.   2. Diverticulosis without CT evidence of acute diverticulitis.   3. Decreased patchy ground glass  opacities in the lung bases,   suggestive of resolving infection.      I have personally reviewed the examination and initial interpretation   and I agree with the findings.      ALFREDA SHIPLEY MD          Assessments & Plan (with Medical Decision Making)   Andrew Lockwood is a 52 year old male with history of HIV (last CD4 count was 438 on 1/7/2020), type II diabetes, prior appendectomy, recurrent SBOs who presents with abdominal pain, constipation and absence of flatus for the past 7 hours.      Ddx: diverticulitis, SBO, constipation, UTI    Patient is a difficult historian and is unwilling to fully cooperate with providing history.  He does appear that he was recently seen in this emergency department with similar lower left quadrant abdominal pain.  CT scan from that encounter showed diverticula but no diverticulitis.  He had questionable wall thickening of the rectosigmoid colon but no adjacent inflammation.  He was also noted to have chronic esophagitis changes and circumferential wall thickening at the bladder concerning for chronic outlet obstruction.    Patient returned with rebound tenderness and guarding in the left lower quadrant.  Repeat CT abdomen pelvis obtained to evaluate for progression to diverticulitis versus intra-abdominal abscess or obstruction.      Given 2 L of IV fluids to treat hyperglycemia.    On recheck glucose 155.  Urinalysis negative for infection.  Additional labs within normal limits.    CT resulted, as above without any acute findings.  Patient reassured.  He was provided with a prescription for senna docusate for treatment of his constipation.  Patient advised to call his primary care providers or diabetic nurse to discuss recent hyperglycemia in the setting of corticosteroid shot.  He agrees to consult them on recommendations for modification of his baseline insulin regimen.  He is provided detailed return precautions including worsening pain, blood in his stool, inability to  pass flatus, fevers.      I have reviewed the nursing notes.    I have reviewed the findings, diagnosis, plan and need for follow up with the patient.    New Prescriptions    SENNA-DOCUSATE SODIUM (SENNA S) 8.6-50 MG TABLET    Take 1 tablet by mouth At Bedtime for 7 days       Final diagnoses:   Constipation, unspecified constipation type   LLQ abdominal pain   Hyperglycemia     I, Yadira Boss, am serving as a trained medical scribe to document services personally performed by Jeannine Moses MD based on the provider's statements to me on February 7, 2020.  This document has been checked and approved by the attending provider.    Jeannine KELLY MD, was physically present and have reviewed and verified the accuracy of this note documented by Yadira Boss, medical scribe.     2/7/2020   [facility], [place], EMERGENCY DEPARTMENT     Jeannine Moses MD  02/07/20 0430

## 2020-02-07 NOTE — ED AVS SNAPSHOT
[facility], [place], Emergency Department  [address] [place] [id number]  Phone:  [phone]                                    Andrew Lockwood   MRN: [id number]    Department:  [facility], Emergency Department   Date of Visit:  2/7/2020           After Visit Summary Signature Page    I have received my discharge instructions, and my questions have been answered. I have discussed any challenges I see with this plan with the nurse or doctor.    ..........................................................................................................................................  Patient/Patient Representative Signature      ..........................................................................................................................................  Patient Representative Print Name and Relationship to Patient    ..................................................               ................................................  Date                                   Time    ..........................................................................................................................................  Reviewed by Signature/Title    ...................................................              ..............................................  Date                                               Time          22EPIC Rev 08/18

## 2020-02-07 NOTE — DISCHARGE INSTRUCTIONS
Please make an appointment to follow up with Your Primary Care Provider in 3-4 days as needed.    Take senna docusate for treatment of your observation.  Read to the handout on treating constipation by dietary and lifestyle changes.    Should call to speak with your diabetes provider to discuss increased blood glucose levels in the setting of recent steroid injection.  They may be able to advise you over the phone regarding recommended adjustments in your insulin regimen.    Return to the emergency department for worsening abdominal pain, blood in your stool, inability to pass gas from below, fevers, recurrent vomiting.

## 2020-02-07 NOTE — ED TRIAGE NOTES
"Pt c/o RLQ abd pain and high BG. States his BG was 446. Recently had CT which showed \"abd swelling\".   "

## 2020-02-10 ENCOUNTER — TELEPHONE (OUTPATIENT)
Dept: INFECTIOUS DISEASES | Facility: CLINIC | Age: 55
End: 2020-02-10

## 2020-02-10 NOTE — TELEPHONE ENCOUNTER
Left message for pt reminding them of upcoming appointment.  Instructed pt to bring updated medications list.  Wendi Loaiza MA

## 2020-02-11 ENCOUNTER — OFFICE VISIT (OUTPATIENT)
Dept: INFECTIOUS DISEASES | Facility: CLINIC | Age: 55
End: 2020-02-11
Attending: INTERNAL MEDICINE
Payer: COMMERCIAL

## 2020-02-11 ENCOUNTER — OFFICE VISIT (OUTPATIENT)
Dept: ENDOCRINOLOGY | Facility: CLINIC | Age: 55
End: 2020-02-11
Payer: COMMERCIAL

## 2020-02-11 VITALS
HEIGHT: 64 IN | HEART RATE: 112 BPM | WEIGHT: 168.43 LBS | DIASTOLIC BLOOD PRESSURE: 84 MMHG | RESPIRATION RATE: 18 BRPM | BODY MASS INDEX: 28.76 KG/M2 | SYSTOLIC BLOOD PRESSURE: 137 MMHG | OXYGEN SATURATION: 97 % | TEMPERATURE: 98 F

## 2020-02-11 VITALS
BODY MASS INDEX: 28.75 KG/M2 | HEART RATE: 106 BPM | DIASTOLIC BLOOD PRESSURE: 75 MMHG | HEIGHT: 64 IN | WEIGHT: 168.4 LBS | SYSTOLIC BLOOD PRESSURE: 126 MMHG

## 2020-02-11 DIAGNOSIS — F19.90: ICD-10-CM

## 2020-02-11 DIAGNOSIS — E11.65 TYPE 2 DIABETES MELLITUS WITH HYPERGLYCEMIA, WITHOUT LONG-TERM CURRENT USE OF INSULIN (H): ICD-10-CM

## 2020-02-11 DIAGNOSIS — B20 HUMAN IMMUNODEFICIENCY VIRUS (HIV) DISEASE (H): Primary | ICD-10-CM

## 2020-02-11 DIAGNOSIS — Z72.51 HIGH RISK SEXUAL BEHAVIOR, UNSPECIFIED TYPE: ICD-10-CM

## 2020-02-11 DIAGNOSIS — E11.69 TYPE 2 DIABETES MELLITUS WITH OTHER SPECIFIED COMPLICATION, UNSPECIFIED WHETHER LONG TERM INSULIN USE (H): ICD-10-CM

## 2020-02-11 DIAGNOSIS — R73.9 HYPERGLYCEMIA: ICD-10-CM

## 2020-02-11 DIAGNOSIS — L73.9 FOLLICULITIS: ICD-10-CM

## 2020-02-11 DIAGNOSIS — B20 HUMAN IMMUNODEFICIENCY VIRUS (HIV) DISEASE (H): ICD-10-CM

## 2020-02-11 DIAGNOSIS — E11.65 TYPE 2 DIABETES MELLITUS WITH HYPERGLYCEMIA, WITHOUT LONG-TERM CURRENT USE OF INSULIN (H): Primary | ICD-10-CM

## 2020-02-11 LAB
ALBUMIN SERPL-MCNC: 3.7 G/DL (ref 3.4–5)
ALP SERPL-CCNC: 132 U/L (ref 40–150)
ALT SERPL W P-5'-P-CCNC: 30 U/L (ref 0–70)
ANION GAP SERPL CALCULATED.3IONS-SCNC: 7 MMOL/L (ref 3–14)
AST SERPL W P-5'-P-CCNC: 16 U/L (ref 0–45)
BASOPHILS # BLD AUTO: 0.1 10E9/L (ref 0–0.2)
BASOPHILS NFR BLD AUTO: 0.7 %
BILIRUB SERPL-MCNC: 0.7 MG/DL (ref 0.2–1.3)
BUN SERPL-MCNC: 22 MG/DL (ref 7–30)
CALCIUM SERPL-MCNC: 8.7 MG/DL (ref 8.5–10.1)
CHLORIDE SERPL-SCNC: 98 MMOL/L (ref 94–109)
CO2 SERPL-SCNC: 25 MMOL/L (ref 20–32)
CREAT SERPL-MCNC: 0.88 MG/DL (ref 0.66–1.25)
CREAT UR-MCNC: 36 MG/DL
DIFFERENTIAL METHOD BLD: NORMAL
EOSINOPHIL # BLD AUTO: 0.2 10E9/L (ref 0–0.7)
EOSINOPHIL NFR BLD AUTO: 1.9 %
ERYTHROCYTE [DISTWIDTH] IN BLOOD BY AUTOMATED COUNT: 13.7 % (ref 10–15)
GFR SERPL CREATININE-BSD FRML MDRD: >90 ML/MIN/{1.73_M2}
GLUCOSE SERPL-MCNC: 434 MG/DL (ref 70–99)
HBA1C MFR BLD: 11.6 % (ref 0–5.6)
HCT VFR BLD AUTO: 41.3 % (ref 40–53)
HGB BLD-MCNC: 14.1 G/DL (ref 13.3–17.7)
IMM GRANULOCYTES # BLD: 0.1 10E9/L (ref 0–0.4)
IMM GRANULOCYTES NFR BLD: 1 %
LYMPHOCYTES # BLD AUTO: 2.7 10E9/L (ref 0.8–5.3)
LYMPHOCYTES NFR BLD AUTO: 28.2 %
MCH RBC QN AUTO: 31 PG (ref 26.5–33)
MCHC RBC AUTO-ENTMCNC: 34.1 G/DL (ref 31.5–36.5)
MCV RBC AUTO: 91 FL (ref 78–100)
MICROALBUMIN UR-MCNC: 7 MG/L
MICROALBUMIN/CREAT UR: 20.66 MG/G CR (ref 0–17)
MONOCYTES # BLD AUTO: 0.7 10E9/L (ref 0–1.3)
MONOCYTES NFR BLD AUTO: 7 %
NEUTROPHILS # BLD AUTO: 5.8 10E9/L (ref 1.6–8.3)
NEUTROPHILS NFR BLD AUTO: 61.2 %
NRBC # BLD AUTO: 0 10*3/UL
NRBC BLD AUTO-RTO: 0 /100
PLATELET # BLD AUTO: 303 10E9/L (ref 150–450)
POTASSIUM SERPL-SCNC: 3.8 MMOL/L (ref 3.4–5.3)
PROT SERPL-MCNC: 7.5 G/DL (ref 6.8–8.8)
RBC # BLD AUTO: 4.55 10E12/L (ref 4.4–5.9)
SODIUM SERPL-SCNC: 130 MMOL/L (ref 133–144)
TSH SERPL DL<=0.005 MIU/L-ACNC: 2.66 MU/L (ref 0.4–4)
WBC # BLD AUTO: 9.4 10E9/L (ref 4–11)

## 2020-02-11 PROCEDURE — 86780 TREPONEMA PALLIDUM: CPT | Performed by: INTERNAL MEDICINE

## 2020-02-11 PROCEDURE — 86803 HEPATITIS C AB TEST: CPT | Performed by: INTERNAL MEDICINE

## 2020-02-11 PROCEDURE — 87591 N.GONORRHOEAE DNA AMP PROB: CPT | Performed by: INTERNAL MEDICINE

## 2020-02-11 PROCEDURE — 87491 CHLMYD TRACH DNA AMP PROBE: CPT | Performed by: INTERNAL MEDICINE

## 2020-02-11 PROCEDURE — 86359 T CELLS TOTAL COUNT: CPT | Performed by: INTERNAL MEDICINE

## 2020-02-11 PROCEDURE — 86360 T CELL ABSOLUTE COUNT/RATIO: CPT | Performed by: INTERNAL MEDICINE

## 2020-02-11 PROCEDURE — 85025 COMPLETE CBC W/AUTO DIFF WBC: CPT | Performed by: INTERNAL MEDICINE

## 2020-02-11 PROCEDURE — G0463 HOSPITAL OUTPT CLINIC VISIT: HCPCS | Mod: ZF

## 2020-02-11 PROCEDURE — 80053 COMPREHEN METABOLIC PANEL: CPT | Performed by: INTERNAL MEDICINE

## 2020-02-11 PROCEDURE — 36415 COLL VENOUS BLD VENIPUNCTURE: CPT | Performed by: INTERNAL MEDICINE

## 2020-02-11 PROCEDURE — 87536 HIV-1 QUANT&REVRSE TRNSCRPJ: CPT | Performed by: INTERNAL MEDICINE

## 2020-02-11 PROCEDURE — 83036 HEMOGLOBIN GLYCOSYLATED A1C: CPT | Performed by: INTERNAL MEDICINE

## 2020-02-11 RX ORDER — INSULIN LISPRO 100 [IU]/ML
INJECTION, SOLUTION INTRAVENOUS; SUBCUTANEOUS
Qty: 15 ML | Refills: 3 | Status: ON HOLD | OUTPATIENT
Start: 2020-02-11 | End: 2020-02-26

## 2020-02-11 RX ORDER — MUPIROCIN 20 MG/G
OINTMENT TOPICAL 3 TIMES DAILY
Qty: 30 G | Refills: 0 | Status: SHIPPED | OUTPATIENT
Start: 2020-02-11 | End: 2020-02-28

## 2020-02-11 ASSESSMENT — PAIN SCALES - GENERAL
PAINLEVEL: NO PAIN (0)
PAINLEVEL: NO PAIN (0)

## 2020-02-11 ASSESSMENT — PATIENT HEALTH QUESTIONNAIRE - PHQ9: SUM OF ALL RESPONSES TO PHQ QUESTIONS 1-9: 0

## 2020-02-11 ASSESSMENT — MIFFLIN-ST. JEOR
SCORE: 1514.86
SCORE: 1515.25

## 2020-02-11 NOTE — Clinical Note
2/11/2020      RE: Andrew Lokcwood  [address] [place] [id number]         Routine HIV follow-up    Ayala Prieto MD

## 2020-02-11 NOTE — PROGRESS NOTES
HPI  Andrew Lockwood is a 52 year old male with type 2 diabetes mellitus here today for a follow up visit.  He was last seen in our clinic in July 2019.  Pt states he was diagnosed with type 2 diabetes mellitus in 2014.  He tells me that he has had laser treatment in his left eye in the past.  He was seen here by ophthalmology and was diagnosed having an inferior and nasal peripheral horseshoe tear.    No known nephropathy or neuropathy.  His medical history is also significant for HIV/AIDS and CNS toxoplasmosis.    He has been in the ED frequently with abd pain and hyperglycemia.   He is currently at the sober house in [facility].  He states he has been sober.  Pt did not tolerate Trulicity- rash per pt, Metformin- GI distress, Januvia and Jardiance caused abdominal pain per patient.  For his diabetes, he is currently taking Lantus 30 units subcutaneous each am and 30 units subcutaneous each pm. He has been taking Humalog 15 units with meals.  Pt's A1C was 12.3 % on 1/7/2020.  We downloaded his glucose meter and his blood sugar values are starting to come down with a blood sugar of 199 predinner last pm.  His blood sugar in clinic now at 11 am is 232. He has not taken his am Lantus dose and did not eat yet today. I asked him to take his Lantus as soon as he returns home.  On ROS today, less abd pain at this time. No diarrhea.  No recurrent seizures.  Patient denies frequent headaches, nausea, vomiting, chest pain, dysuria, hematuria, numbness tingling or pain in his feet toes hands or fingers.    Diabetes Care  Retinopathy: no DR. He was seen by Oph here in 3/2018 with inferior and nasal horseshoe tears left eye. Will arrange for pt to see Oph here for follow up.  Nephropathy:none; urine microalbuminuria was negative in May 2017. Urine microalbuminuria + in 11/2018.  Neuropathy: none.  Foot Exam: no ulcers.  Taking aspirin:no.  Lipids:  in 8/2018.    ROS  Please see under history of present  illness.    Allergies  Allergies   Allergen Reactions     Metformin      Abdominal pain     Milk [Lac Bovis] Hives     Dulaglutide Rash       Medications  Current Outpatient Medications   Medication Sig Dispense Refill     HUMALOG KWIKPEN 100 UNIT/ML soln Inject 10 - 15 units with meals, plus correction.  Pt uses approx 60 units in 24 hrs. 15 mL 3     insulin glargine (LANTUS PEN) 100 UNIT/ML pen Inject 30 Units Subcutaneous 2 times daily 15 mL 3     Nutritional Supplements (GLUCOSE MANAGEMENT) TABS Use 1-2 tabs for hypoglycemia. 30 tablet 3     bictegravir-emtricitabine-tenofovir (BIKTARVY) -25 MG per tablet Take 1 tablet by mouth daily 30 tablet 11     blood glucose (NO BRAND SPECIFIED) lancets standard Use to test blood sugar four times daily or as directed. 100 each 11     blood glucose (NO BRAND SPECIFIED) test strip 1 strip by In Vitro route 4 times daily (before meals and nightly) Use to test blood sugars 4 times daily or as directed 100 strip 11     blood glucose monitoring (NO BRAND SPECIFIED) meter device kit Use to test blood sugar 4 times daily or as directed. 1 kit 0     blood glucose monitoring (NO BRAND SPECIFIED) meter device kit Use to test blood sugar 4 times daily or as directed. 1 kit 0     cetirizine (ZYRTEC) 10 MG tablet Take 1 tablet (10 mg) by mouth daily 30 tablet 0     famotidine (PEPCID) 20 MG tablet Take 1 tablet (20 mg) by mouth 2 times daily 60 tablet 1     ibuprofen (ADVIL/MOTRIN) 200 MG tablet Take 600 mg by mouth every 4 hours as needed for mild pain       insulin pen needle (B-D U/F) 31G X 8 MM miscellaneous Use 1 pen needles daily or as directed. 100 each 3     insulin pen needle (BD PEN NEEDLE SCOTT 2ND GEN) 32G X 4 MM miscellaneous Use 3-4 daily. 250 each 3     ondansetron (ZOFRAN-ODT) 4 MG ODT tab Take 1 tablet (4 mg) by mouth every 6 hours as needed for nausea or vomiting 12 tablet 0     SENNA-docusate sodium (SENNA S) 8.6-50 MG tablet Take 1 tablet by mouth At Bedtime for  7 days 7 tablet 0       Family History  family history includes Alzheimer Disease in his father; Diabetes in his brother, father, and paternal grandfather; Unknown/Adopted in his mother.    Social History   reports that he has been smoking cigarettes. He has been smoking about 0.25 packs per day. He has quit using smokeless tobacco. He reports previous drug use. Drug: Marijuana. He reports that he does not drink alcohol.       Past Medical History  Past Medical History:   Diagnosis Date     AIDS (H)      Allergic rhinitis due to other allergen     DNS     Chronic abdominal pain      CNS toxoplasmosis (H)      Diabetes type 2, controlled (H)      GERD (gastroesophageal reflux disease)      HIV (human immunodeficiency virus infection) (H)      Periungual wart      Sleep apnea     doesn't use CPAP       Past Surgical History:   Procedure Laterality Date     C NONSPECIFIC PROCEDURE      right forearm fracture     COLONOSCOPY Left 1/22/2016    Procedure: COMBINED COLONOSCOPY, SINGLE OR MULTIPLE BIOPSY/POLYPECTOMY BY BIOPSY;  Surgeon: Clark Saini MD;  Location: [facility]     HC EXPLORE UNDESC TESTIS,INGUIN/SCROTAL       LAPAROSCOPIC APPENDECTOMY N/A 1/31/2018    Procedure: LAPAROSCOPIC APPENDECTOMY;  LAPAROSCOPIC APPENDECTOMY;  Surgeon: Dawn Holt MD;  Location: [facility]     LAPAROSCOPY DIAGNOSTIC (GENERAL) N/A 7/26/2016    Procedure: LAPAROSCOPY DIAGNOSTIC (GENERAL);  Surgeon: Susannah Arriaga MD;  Location: [facility]     LAPAROSCOPY DIAGNOSTIC (GENERAL) N/A 4/16/2018    Procedure: LAPAROSCOPY DIAGNOSTIC (GENERAL);  Diagnostic laparoscopy and lysis of adhesions;  Surgeon: Prince Dowling MD;  Location: [facility]     OPTICAL TRACKING SYSTEM CRANIOTOMY, EXCISE TUMOR, COMBINED Left 4/10/2015    Procedure: COMBINED OPTICAL TRACKING SYSTEM CRANIOTOMY, EXCISE TUMOR;  Surgeon: Mirlande Colmenares MD;  Location: [facility]     REPAIR GAMEKEEPER'S THUMB Right 12/2/2016    Procedure: REPAIR LIGAMENT ULNAR  "COLLATERAL THUMB (GAMEKEEPER'S);  Surgeon: Evin Zamorano MD;  [facility]       Physical Exam  /75   Pulse 106   Ht 1.626 m (5' 4\")   Wt 76.4 kg (168 lb 6.4 oz)   BMI 28.91 kg/m    Body mass index is 28.91 kg/m .    GENERAL : In no apparent distress at this time.  FEET:  No ulcers; normal monofilamentous exam.    RESULTS  Creatinine   Date Value Ref Range Status   02/07/2020 0.68 0.66 - 1.25 mg/dL Final     GFR Estimate   Date Value Ref Range Status   02/07/2020 >90 >60 mL/min/[1.73_m2] Final     Comment:     Non  GFR Calc  Starting 12/18/2018, serum creatinine based estimated GFR (eGFR) will be   calculated using the Chronic Kidney Disease Epidemiology Collaboration   (CKD-EPI) equation.       Hemoglobin A1C   Date Value Ref Range Status   01/07/2020 12.3 (H) 0 - 5.6 % Final     Comment:     Normal <5.7% Prediabetes 5.7-6.4%  Diabetes 6.5% or higher - adopted from ADA   consensus guidelines.       Potassium   Date Value Ref Range Status   02/07/2020 4.0 3.4 - 5.3 mmol/L Final     ALT   Date Value Ref Range Status   02/07/2020 23 0 - 70 U/L Final     AST   Date Value Ref Range Status   02/07/2020 15 0 - 45 U/L Final     TSH   Date Value Ref Range Status   02/15/2018 3.57 0.40 - 4.00 mU/L Final     T4 Free   Date Value Ref Range Status   04/14/2015 1.02 0.76 - 1.46 ng/dL Final       Cholesterol   Date Value Ref Range Status   08/15/2018 180 <200 mg/dL Final   08/11/2017 194 <200 mg/dL Final     HDL Cholesterol   Date Value Ref Range Status   08/15/2018 38 (L) >39 mg/dL Final   08/11/2017 37 (L) >39 mg/dL Final     LDL Cholesterol Calculated   Date Value Ref Range Status   08/15/2018 105 (H) <100 mg/dL Final     Comment:     Above desirable:  100-129 mg/dl  Borderline High:  130-159 mg/dL  High:             160-189 mg/dL  Very high:       >189 mg/dl     08/11/2017 88 <100 mg/dL Final     Comment:     Desirable:       <100 mg/dl     Triglycerides   Date Value Ref Range Status "   08/15/2018 190 (H) <150 mg/dL Final     Comment:     Borderline high:  150-199 mg/dl  High:             200-499 mg/dl  Very high:       >499 mg/dl     08/11/2017 345 (H) <150 mg/dL Final     Comment:     Borderline high:  150-199 mg/dl   High:             200-499 mg/dl   Very high:       >499 mg/dl       Cholesterol/HDL Ratio   Date Value Ref Range Status   05/07/2015 5.0 0.0 - 5.0 Final   04/18/2005 6.3 (H) 0.0 - 5.0 Final     Lab Results   Component Value Date    A1C 6.0 08/11/2017    A1C 6.2 05/23/2017    A1C 6.4 01/23/2017    A1C 7.0 08/18/2016    A1C 8.3 06/16/2016     A1C  12.3 % on 1/7/2020.    ASSESSMENT/PLAN:    1. TYPE 2 DIABETES MELLITUS:  Uncontrolled type 2 diabetes mellitus.  Reviewed the importance of taking insulin daily as prescribed.  He is to remain on Lantus 30 units subcutaneous BID and Novolog 10-15 units with meals.  I will see him in clinic monthly.  He is currently living at the [facility] in [facility].  Pt instructed to check his fasting blood sugar each am and blood sugar before lunch and dinner DAILY.  He is normotensive today.  Will refer to Oph for annual eye exam.  Feet without ulcers today.  I placed an order for a urine microalbuminuria and TSH today.  Andrew states he had the flu vaccine this season.    2. HIV/AIDS: Pt is followed here by ID staff.    3.  Return to Endocrine Clinic to see me monthly.

## 2020-02-11 NOTE — LETTER
2/11/2020     RE: Andrew Lockwood  [address]  [facility] [address]     Dear Colleague,    Thank you for referring your patient, Andrew Lockwood, to the [facility] ENDOCRINOLOGY at [facility]. Please see a copy of my visit note below.    HPI  Andrew Lockwood is a 52 year old male with type 2 diabetes mellitus here today for a follow up visit.  He was last seen in our clinic in July 2019.  Pt states he was diagnosed with type 2 diabetes mellitus in 2014.  He tells me that he has had laser treatment in his left eye in the past.  He was seen here by ophthalmology and was diagnosed having an inferior and nasal peripheral horseshoe tear.    No known nephropathy or neuropathy.  His medical history is also significant for HIV/AIDS and CNS toxoplasmosis.    He has been in the ED frequently with abd pain and hyperglycemia.   He is currently at the sober house in [facility].  He states he has been sober.  Pt did not tolerate Trulicity- rash per pt, Metformin- GI distress, Januvia and Jardiance caused abdominal pain per patient.  For his diabetes, he is currently taking Lantus 30 units subcutaneous each am and 30 units subcutaneous each pm. He has been taking Humalog 15 units with meals.  Pt's A1C was 12.3 % on 1/7/2020.  We downloaded his glucose meter and his blood sugar values are starting to come down with a blood sugar of 199 predinner last pm.  His blood sugar in clinic now at 11 am is 232. He has not taken his am Lantus dose and did not eat yet today. I asked him to take his Lantus as soon as he returns home.  On ROS today, less abd pain at this time. No diarrhea.  No recurrent seizures.  Patient denies frequent headaches, nausea, vomiting, chest pain, dysuria, hematuria, numbness tingling or pain in his feet toes hands or fingers.    Diabetes Care  Retinopathy: no DR. He was seen by Oph here in 3/2018 with inferior and nasal horseshoe tears left eye. Will arrange for pt to see Oph here for  follow up.  Nephropathy:none; urine microalbuminuria was negative in May 2017. Urine microalbuminuria + in 11/2018.  Neuropathy: none.  Foot Exam: no ulcers.  Taking aspirin:no.  Lipids:  in 8/2018.    ROS  Please see under history of present illness.    Allergies  Allergies   Allergen Reactions     Metformin      Abdominal pain     Milk [Lac Bovis] Hives     Dulaglutide Rash       Medications  Current Outpatient Medications   Medication Sig Dispense Refill     HUMALOG KWIKPEN 100 UNIT/ML soln Inject 10 - 15 units with meals, plus correction.  Pt uses approx 60 units in 24 hrs. 15 mL 3     insulin glargine (LANTUS PEN) 100 UNIT/ML pen Inject 30 Units Subcutaneous 2 times daily 15 mL 3     Nutritional Supplements (GLUCOSE MANAGEMENT) TABS Use 1-2 tabs for hypoglycemia. 30 tablet 3     bictegravir-emtricitabine-tenofovir (BIKTARVY) -25 MG per tablet Take 1 tablet by mouth daily 30 tablet 11     blood glucose (NO BRAND SPECIFIED) lancets standard Use to test blood sugar four times daily or as directed. 100 each 11     blood glucose (NO BRAND SPECIFIED) test strip 1 strip by In Vitro route 4 times daily (before meals and nightly) Use to test blood sugars 4 times daily or as directed 100 strip 11     blood glucose monitoring (NO BRAND SPECIFIED) meter device kit Use to test blood sugar 4 times daily or as directed. 1 kit 0     blood glucose monitoring (NO BRAND SPECIFIED) meter device kit Use to test blood sugar 4 times daily or as directed. 1 kit 0     cetirizine (ZYRTEC) 10 MG tablet Take 1 tablet (10 mg) by mouth daily 30 tablet 0     famotidine (PEPCID) 20 MG tablet Take 1 tablet (20 mg) by mouth 2 times daily 60 tablet 1     ibuprofen (ADVIL/MOTRIN) 200 MG tablet Take 600 mg by mouth every 4 hours as needed for mild pain       insulin pen needle (B-D U/F) 31G X 8 MM miscellaneous Use 1 pen needles daily or as directed. 100 each 3     insulin pen needle (BD PEN NEEDLE SCOTT 2ND GEN) 32G X 4 MM  miscellaneous Use 3-4 daily. 250 each 3     ondansetron (ZOFRAN-ODT) 4 MG ODT tab Take 1 tablet (4 mg) by mouth every 6 hours as needed for nausea or vomiting 12 tablet 0     SENNA-docusate sodium (SENNA S) 8.6-50 MG tablet Take 1 tablet by mouth At Bedtime for 7 days 7 tablet 0       Family History  family history includes Alzheimer Disease in his father; Diabetes in his brother, father, and paternal grandfather; Unknown/Adopted in his mother.    Social History   reports that he has been smoking cigarettes. He has been smoking about 0.25 packs per day. He has quit using smokeless tobacco. He reports previous drug use. Drug: Marijuana. He reports that he does not drink alcohol.       Past Medical History  Past Medical History:   Diagnosis Date     AIDS (H)      Allergic rhinitis due to other allergen     DNS     Chronic abdominal pain      CNS toxoplasmosis (H)      Diabetes type 2, controlled (H)      GERD (gastroesophageal reflux disease)      HIV (human immunodeficiency virus infection) (H)      Periungual wart      Sleep apnea     doesn't use CPAP       Past Surgical History:   Procedure Laterality Date     C NONSPECIFIC PROCEDURE      right forearm fracture     COLONOSCOPY Left 1/22/2016    Procedure: COMBINED COLONOSCOPY, SINGLE OR MULTIPLE BIOPSY/POLYPECTOMY BY BIOPSY;  Surgeon: Clark Saini MD;  Location:  GI     HC EXPLORE UNDESC TESTIS,INGUIN/SCROTAL       LAPAROSCOPIC APPENDECTOMY N/A 1/31/2018    Procedure: LAPAROSCOPIC APPENDECTOMY;  LAPAROSCOPIC APPENDECTOMY;  Surgeon: Dawn Holt MD;  Location: [facility]     LAPAROSCOPY DIAGNOSTIC (GENERAL) N/A 7/26/2016    Procedure: LAPAROSCOPY DIAGNOSTIC (GENERAL);  Surgeon: Susannah Arriaga MD;  Location:  OR     LAPAROSCOPY DIAGNOSTIC (GENERAL) N/A 4/16/2018    Procedure: LAPAROSCOPY DIAGNOSTIC (GENERAL);  Diagnostic laparoscopy and lysis of adhesions;  Surgeon: Prinec Dowling MD;  Location:  OR     OPTICAL TRACKING SYSTEM  "CRANIOTOMY, EXCISE TUMOR, COMBINED Left 4/10/2015    Procedure: COMBINED OPTICAL TRACKING SYSTEM CRANIOTOMY, EXCISE TUMOR;  Surgeon: Mirlande Colmenares MD;  Location: [facility] OR     REPAIR GAMEKEEPER'S THUMB Right 12/2/2016    Procedure: REPAIR LIGAMENT ULNAR COLLATERAL THUMB (GAMEKEEPER'S);  Surgeon: Evin Zamorano MD;  Location:  OR       Physical Exam  /75   Pulse 106   Ht 1.626 m (5' 4\")   Wt 76.4 kg (168 lb 6.4 oz)   BMI 28.91 kg/m     Body mass index is 28.91 kg/m .    GENERAL : In no apparent distress at this time.  FEET:  No ulcers; normal monofilamentous exam.    RESULTS  Creatinine   Date Value Ref Range Status   02/07/2020 0.68 0.66 - 1.25 mg/dL Final     GFR Estimate   Date Value Ref Range Status   02/07/2020 >90 >60 mL/min/[1.73_m2] Final     Comment:     Non  GFR Calc  Starting 12/18/2018, serum creatinine based estimated GFR (eGFR) will be   calculated using the Chronic Kidney Disease Epidemiology Collaboration   (CKD-EPI) equation.       Hemoglobin A1C   Date Value Ref Range Status   01/07/2020 12.3 (H) 0 - 5.6 % Final     Comment:     Normal <5.7% Prediabetes 5.7-6.4%  Diabetes 6.5% or higher - adopted from ADA   consensus guidelines.       Potassium   Date Value Ref Range Status   02/07/2020 4.0 3.4 - 5.3 mmol/L Final     ALT   Date Value Ref Range Status   02/07/2020 23 0 - 70 U/L Final     AST   Date Value Ref Range Status   02/07/2020 15 0 - 45 U/L Final     TSH   Date Value Ref Range Status   02/15/2018 3.57 0.40 - 4.00 mU/L Final     T4 Free   Date Value Ref Range Status   04/14/2015 1.02 0.76 - 1.46 ng/dL Final       Cholesterol   Date Value Ref Range Status   08/15/2018 180 <200 mg/dL Final   08/11/2017 194 <200 mg/dL Final     HDL Cholesterol   Date Value Ref Range Status   08/15/2018 38 (L) >39 mg/dL Final   08/11/2017 37 (L) >39 mg/dL Final     LDL Cholesterol Calculated   Date Value Ref Range Status   08/15/2018 105 (H) <100 mg/dL Final     Comment:     " Above desirable:  100-129 mg/dl  Borderline High:  130-159 mg/dL  High:             160-189 mg/dL  Very high:       >189 mg/dl     08/11/2017 88 <100 mg/dL Final     Comment:     Desirable:       <100 mg/dl     Triglycerides   Date Value Ref Range Status   08/15/2018 190 (H) <150 mg/dL Final     Comment:     Borderline high:  150-199 mg/dl  High:             200-499 mg/dl  Very high:       >499 mg/dl     08/11/2017 345 (H) <150 mg/dL Final     Comment:     Borderline high:  150-199 mg/dl   High:             200-499 mg/dl   Very high:       >499 mg/dl       Cholesterol/HDL Ratio   Date Value Ref Range Status   05/07/2015 5.0 0.0 - 5.0 Final   04/18/2005 6.3 (H) 0.0 - 5.0 Final     Lab Results   Component Value Date    A1C 6.0 08/11/2017    A1C 6.2 05/23/2017    A1C 6.4 01/23/2017    A1C 7.0 08/18/2016    A1C 8.3 06/16/2016     A1C  12.3 % on 1/7/2020.    ASSESSMENT/PLAN:    1. TYPE 2 DIABETES MELLITUS:  Uncontrolled type 2 diabetes mellitus.  Reviewed the importance of taking insulin daily as prescribed.  He is to remain on Lantus 30 units subcutaneous BID and Novolog 10-15 units with meals.  I will see him in clinic monthly.  He is currently living at the [facility] in [facility].  Pt instructed to check his fasting blood sugar each am and blood sugar before lunch and dinner DAILY.  He is normotensive today.  Will refer to Oph for annual eye exam.  Feet without ulcers today.  I placed an order for a urine microalbuminuria and TSH today.  Andrew states he had the flu vaccine this season.    2. HIV/AIDS: Pt is followed here by ID staff.    3.  Return to Endocrine Clinic to see me monthly.      Again, thank you for allowing me to participate in the care of your patient.      Sincerely,    Ashley Connelly PA-C

## 2020-02-11 NOTE — LETTER
2/11/2020       RE: Andrew Lockwood  [address] [facility] [address]     Dear Colleague,    Thank you for referring your patient, Andrew Lockwood, to the [facility] AND INFECTIOUS DISEASES at [facility]. Please see a copy of my visit note below.      Routine HIV follow-up    Again, thank you for allowing me to participate in the care of your patient.      Sincerely,    Ayala Prieto MD

## 2020-02-11 NOTE — PATIENT INSTRUCTIONS
Continue Lantus 30 units each am and each pm.  Take Humalog 10-15 units with meals.  Have labs done today.  See me monthly.  Ashley Connelly PA-C

## 2020-02-11 NOTE — NURSING NOTE
"Chief Complaint   Patient presents with     RECHECK     B20       Vital signs:  Temp: 98  F (36.7  C) Temp src: Oral BP: 137/84 Pulse: 112   Resp: 18 SpO2: 97 %     Height: 162.6 cm (5' 4.02\") Weight: 76.4 kg (168 lb 6.9 oz)  Estimated body mass index is 28.9 kg/m  as calculated from the following:    Height as of this encounter: 1.626 m (5' 4.02\").    Weight as of this encounter: 76.4 kg (168 lb 6.9 oz).          Valeri Van, [facility]  2/11/2020 3:59 PM      "

## 2020-02-11 NOTE — Clinical Note
2/11/2020       RE: Andrew Lockwood  [address] [facility] [address]     Dear Colleague,    Thank you for referring your patient, Andrew Lockwood, to the [facility] AND INFECTIOUS DISEASES at [facility]. Please see a copy of my visit note below.      Routine HIV follow-up    Again, thank you for allowing me to participate in the care of your patient.      Sincerely,    Ayala Prieto MD

## 2020-02-12 LAB
C TRACH DNA SPEC QL NAA+PROBE: NEGATIVE
CD3 CELLS # BLD: 1598 CELLS/UL (ref 603–2990)
CD3 CELLS NFR BLD: 57 % (ref 49–84)
CD3+CD4+ CELLS # BLD: 490 CELLS/UL (ref 441–2156)
CD3+CD4+ CELLS NFR BLD: 17 % (ref 28–63)
CD3+CD4+ CELLS/CD3+CD8+ CLL BLD: 0.46 % (ref 1.4–2.6)
CD3+CD8+ CELLS # BLD: 1036 CELLS/UL (ref 125–1312)
CD3+CD8+ CELLS NFR BLD: 37 % (ref 10–40)
HCV AB SERPL QL IA: NONREACTIVE
IFC SPECIMEN: ABNORMAL
N GONORRHOEA DNA SPEC QL NAA+PROBE: NEGATIVE
SPECIMEN SOURCE: NORMAL
SPECIMEN SOURCE: NORMAL
T PALLIDUM AB SER QL: NONREACTIVE

## 2020-02-12 NOTE — PROGRESS NOTES
Routine HIV follow-up      Andrew's social situation is improved of late.  He is currently undergoing chemical dependency treatment and will transition to a sober house.    He struggles with adherence to his HIV medications as his social situation shifts.  He reports close daily adherence since starting the treatment program.    Orders Placed This Encounter   Procedures     CBC with platelets differential     Comprehensive metabolic panel     T cell subset profile     HIV-1 RNA quantitative     Hepatitis C antibody     Treponema Abs w Reflex to RPR and Titer     Hemoglobin A1c     Chlamydia trachomatis PCR (Urine)     Neisseria gonorrhoeae PCR (Urine)       I have asked Andrew to come back in a week to review labs and try to keep him engaged in care.        Ayala Prieto MD, MS  Infectious Disease    Time:  I spent 25 minutes in direct care with this patient, including >50% of time face-to-face with the patient in counseling.

## 2020-02-13 ENCOUNTER — TELEPHONE (OUTPATIENT)
Dept: SURGERY | Facility: CLINIC | Age: 55
End: 2020-02-13

## 2020-02-13 ENCOUNTER — PREP FOR PROCEDURE (OUTPATIENT)
Dept: SURGERY | Facility: CLINIC | Age: 55
End: 2020-02-13

## 2020-02-13 ENCOUNTER — OFFICE VISIT (OUTPATIENT)
Dept: INTERNAL MEDICINE | Facility: CLINIC | Age: 55
End: 2020-02-13
Payer: COMMERCIAL

## 2020-02-13 ENCOUNTER — OFFICE VISIT (OUTPATIENT)
Dept: ORTHOPEDICS | Facility: CLINIC | Age: 55
End: 2020-02-13
Payer: COMMERCIAL

## 2020-02-13 VITALS
WEIGHT: 170 LBS | BODY MASS INDEX: 29.17 KG/M2 | SYSTOLIC BLOOD PRESSURE: 125 MMHG | DIASTOLIC BLOOD PRESSURE: 82 MMHG | HEART RATE: 93 BPM | TEMPERATURE: 97.7 F | OXYGEN SATURATION: 98 %

## 2020-02-13 VITALS — HEIGHT: 64 IN | BODY MASS INDEX: 30.05 KG/M2 | RESPIRATION RATE: 16 BRPM | WEIGHT: 176 LBS

## 2020-02-13 DIAGNOSIS — T14.90XA TRAUMA: ICD-10-CM

## 2020-02-13 DIAGNOSIS — Z79.4 TYPE 2 DIABETES MELLITUS WITHOUT COMPLICATION, WITH LONG-TERM CURRENT USE OF INSULIN (H): ICD-10-CM

## 2020-02-13 DIAGNOSIS — E11.9 TYPE 2 DIABETES MELLITUS WITHOUT COMPLICATION, WITH LONG-TERM CURRENT USE OF INSULIN (H): ICD-10-CM

## 2020-02-13 DIAGNOSIS — K62.82 ANAL DYSPLASIA: Primary | ICD-10-CM

## 2020-02-13 DIAGNOSIS — G89.29 CHRONIC PAIN OF LEFT KNEE: Primary | ICD-10-CM

## 2020-02-13 DIAGNOSIS — M25.562 CHRONIC PAIN OF LEFT KNEE: Primary | ICD-10-CM

## 2020-02-13 DIAGNOSIS — F43.10 PTSD (POST-TRAUMATIC STRESS DISORDER): ICD-10-CM

## 2020-02-13 DIAGNOSIS — F19.11 HISTORY OF DRUG ABUSE (H): ICD-10-CM

## 2020-02-13 DIAGNOSIS — Z21 ASYMPTOMATIC HUMAN IMMUNODEFICIENCY VIRUS (HIV) INFECTION STATUS (H): ICD-10-CM

## 2020-02-13 DIAGNOSIS — B35.1 ONYCHOMYCOSIS: Primary | ICD-10-CM

## 2020-02-13 RX ADMIN — LIDOCAINE HYDROCHLORIDE 5 ML: 10 INJECTION, SOLUTION EPIDURAL; INFILTRATION; INTRACAUDAL; PERINEURAL at 14:48

## 2020-02-13 RX ADMIN — TRIAMCINOLONE ACETONIDE 40 MG: 40 INJECTION, SUSPENSION INTRA-ARTICULAR; INTRAMUSCULAR at 14:48

## 2020-02-13 ASSESSMENT — PAIN SCALES - GENERAL: PAINLEVEL: SEVERE PAIN (6)

## 2020-02-13 ASSESSMENT — MIFFLIN-ST. JEOR: SCORE: 1549.33

## 2020-02-13 NOTE — Clinical Note
2020       RE: Andrew Lockwood  [address]  [facility] [address]     Dear Colleague,    Thank you for referring your patient, Andrew Lockwood, to the [facility] SPORTS AND ORTHOPAEDIC WALK IN CLINIC at [facility]. Please see a copy of my visit note below.    [facility]  Orthopedics  Jarocho Alvarado MD  2020     Name: Andrew Lockwood  MRN: [id number]  Age: 52 year old  : 1967  Referring provider: Referred Self     Chief Complaint: Pain of the Left Knee    History of Present Illness:   Andrew Lockwood is a 52 year old male who presents today for evaluation of left knee pain sustained one week ago without any inciting event or injury. He endorses aching, dull, and throbbing pain in the medial aspect of his left knee. Pain is exacerbated with weightbearing. He is interested in a corticosteroid injection of his left knee.     Review of Systems:   A 10-point review of systems was obtained and is negative except for as noted in the HPI.     Medications:      bictegravir-emtricitabine-tenofovir (BIKTARVY) -25 MG per tablet, Take 1 tablet by mouth daily, Disp: 30 tablet, Rfl: 11     blood glucose (NO BRAND SPECIFIED) lancets standard, Use to test blood sugar four times daily or as directed., Disp: 100 each, Rfl: 11     blood glucose (NO BRAND SPECIFIED) test strip, 1 strip by In Vitro route 4 times daily (before meals and nightly) Use to test blood sugars 4 times daily or as directed, Disp: 100 strip, Rfl: 11     blood glucose monitoring (NO BRAND SPECIFIED) meter device kit, Use to test blood sugar 4 times daily or as directed., Disp: 1 kit, Rfl: 0     blood glucose monitoring (NO BRAND SPECIFIED) meter device kit, Use to test blood sugar 4 times daily or as directed., Disp: 1 kit, Rfl: 0     cetirizine (ZYRTEC) 10 MG tablet, Take 1 tablet (10 mg) by mouth daily, Disp: 30 tablet, Rfl: 0     Ciclopirox 8 % KIT, Externally apply 1 Application topically daily In evening,  remove from nails once weekly with alcohol, Disp: 34 mL, Rfl: 1     famotidine (PEPCID) 20 MG tablet, Take 1 tablet (20 mg) by mouth 2 times daily, Disp: 60 tablet, Rfl: 1     HUMALOG KWIKPEN 100 UNIT/ML soln, Inject 10 - 15 units with meals, plus correction.  Pt uses approx 60 units in 24 hrs., Disp: 15 mL, Rfl: 3     ibuprofen (ADVIL/MOTRIN) 200 MG tablet, Take 600 mg by mouth every 4 hours as needed for mild pain, Disp: , Rfl:      insulin glargine (LANTUS PEN) 100 UNIT/ML pen, Inject 30 Units Subcutaneous 2 times daily, Disp: 15 mL, Rfl: 3     insulin pen needle (B-D U/F) 31G X 8 MM miscellaneous, Use 1 pen needles daily or as directed., Disp: 100 each, Rfl: 3     insulin pen needle (BD PEN NEEDLE SCOTT 2ND GEN) 32G X 4 MM miscellaneous, Use 3-4 daily., Disp: 250 each, Rfl: 3     mupirocin (BACTROBAN) 2 % external ointment, Apply topically 3 times daily, Disp: 30 g, Rfl: 0     Nutritional Supplements (GLUCOSE MANAGEMENT) TABS, Use 1-2 tabs for hypoglycemia., Disp: 30 tablet, Rfl: 3     ondansetron (ZOFRAN-ODT) 4 MG ODT tab, Take 1 tablet (4 mg) by mouth every 6 hours as needed for nausea or vomiting, Disp: 12 tablet, Rfl: 0     SENNA-docusate sodium (SENNA S) 8.6-50 MG tablet, Take 1 tablet by mouth At Bedtime for 7 days, Disp: 7 tablet, Rfl: 0    Current Facility-Administered Medications:      lidocaine (PF) (XYLOCAINE) 1 % injection 4 mL, 4 mL, , , Davis, Win, DO, 4 mL at 01/29/20 1606     triamcinolone (KENALOG-40) injection 40 mg, 40 mg, , , Davis, Win, DO, 40 mg at 01/29/20 1606    Allergies:  Metformin; Milk [lac bovis]; and Dulaglutide     Past Medical History:  AIDS  Allergic rhinitis due to other allergen  Chronic abdominal pain  CNC toxoplasmosis  Diabetes type 2  GERD  HIV  Periungual wart  Sleep apnea    Past Surgical History:  Right forearm fracture   Colonoscopy  HC Explore   Laparoscopic Appendectomy  Optical Tracking System   Repair Gamekeepers Thumb    Social History:  He admits to  tobacco use and denies alcohol use.     Family History:  Positive: Diabetes (brother, father, paternal grandfather), Alzheimer Disease (father)    Physical Examination:  There were no vitals taken for this visit.  Patient is alert, No acute distress, pleasant and conversational.    Gait: nonantalgic. Normal heel toe gait.    Patient is able to perform two legged squat without difficulty.    left knee:   Skin intact. No erythema or ecchymosis.  No effusion or soft tissue swelling.    AROM: Zero to approximately 135  without restriction or reported pain.    Palpation: No medial or lateral facet joint tenderness.  No posterior medial or posterior lateral joint line tenderness     Special Tests:  Negative bounce test, negative forced flexion and negative Raoul's.  No ligamentous laxity or pain with valgus or varus stress.  Negative Lachman's, Anterior Drawer and Posterior Drawer     Full Isometric quad strength, extensor mechanism in place     Neurovascularly intact in the lower extremity    Hip and Ankle with full AROM and nontender    Assessment:   52 year old male with ***    Plan:   - Recommended resting and icing his knee for pain relief.   - Brace for comfort.   - Follow-up with me if pain is persistent or worsens.     Scribe Disclosure:  Cristóbal KELLY, am serving as a scribe to document services personally performed by Jarocho Alvarado MD at this visit, based upon the provider's statements to me. All documentation has been reviewed by the aforementioned provider prior to being entered into the official medical record.                      SPORTS & ORTHOPEDIC WALK-IN VISIT 2/13/2020    Primary Care Physician: Dr. Lamb     Wants cortisone shot in knee. Hurts all the time. Pain is medial side     Reason for visit:     What part of your body is injured / painful?  left knee    What caused the injury /pain? No inciting event     How long ago did your injury occur or pain begin? 1 week     What are your most  bothersome symptoms? Pain    How would you characterize your symptom?  aching, dull and throbing    What makes your symptoms better? Nothing    What makes your symptoms worse? Standing    Have you been previously seen for this problem? Yes,     Medical History:    Any recent changes to your medical history? No    Any new medication prescribed since last visit? No    Have you had surgery on this body part before? No    Social History:    Occupation: No     Handedness: Right    Exercise:     Review of Systems:    Do you have fever, chills, weight loss? No    Do you have any vision problems? No    Do you have any chest pain or edema? No    Do you have any shortness of breath or wheezing?  No    Do you have stomach problems? No    Do you have any numbness or focal weakness? No    Do you have diabetes? Yes, type 2     Do you have problems with bleeding or clotting? No    Do you have an rashes or other skin lesions? No         Large Joint Injection/Arthocentesis: L knee joint  Date/Time: 2/13/2020 2:48 PM  Performed by: Jarocho Alvarado MD  Authorized by: Jarocho Alvarado MD     Indications:  Pain  Needle Size:  22 G  Guidance: landmark guided    Approach:  Anterolateral  Location:  Knee      Medications:  40 mg triamcinolone 40 MG/ML; 5 mL lidocaine (PF) 1 %  Outcome:  Tolerated well, no immediate complications  Procedure discussed: discussed risks, benefits, and alternatives    Consent Given by:  Patient  Timeout: timeout called immediately prior to procedure    Prep: patient was prepped and draped in usual sterile fashion            Again, thank you for allowing me to participate in the care of your patient.      Sincerely,    Jarocho Alvarado MD

## 2020-02-13 NOTE — PATIENT INSTRUCTIONS
[facility] Medication Refill Request Information:  * Please contact your pharmacy regarding ANY request for medication refills.  ** [facility] Prescription Fax = [phone]  * Please allow 3 business days for routine medication refills.  * Please allow 5 business days for controlled substance medication refills.     [facility] Test Result notification information:  *You will be notified with in 7-10 days of your appointment day regarding the results of your test.  If you are on MyChart you will be notified as soon as the provider has reviewed the results and signed off on them.    [facility]: [phone]

## 2020-02-13 NOTE — PROGRESS NOTES
SPORTS & ORTHOPEDIC WALK-IN VISIT 2/13/2020    Primary Care Physician: Dr. Lamb     Wants cortisone shot in knee. Hurts all the time. Pain is medial side     Reason for visit:     What part of your body is injured / painful?  left knee    What caused the injury /pain? No inciting event     How long ago did your injury occur or pain begin? 1 week     What are your most bothersome symptoms? Pain    How would you characterize your symptom?  aching, dull and throbing    What makes your symptoms better? Nothing    What makes your symptoms worse? Standing    Have you been previously seen for this problem? Yes,     Medical History:    Any recent changes to your medical history? No    Any new medication prescribed since last visit? No    Have you had surgery on this body part before? No    Social History:    Occupation: No     Handedness: Right    Exercise:     Review of Systems:    Do you have fever, chills, weight loss? No    Do you have any vision problems? No    Do you have any chest pain or edema? No    Do you have any shortness of breath or wheezing?  No    Do you have stomach problems? No    Do you have any numbness or focal weakness? No    Do you have diabetes? Yes, type 2     Do you have problems with bleeding or clotting? No    Do you have an rashes or other skin lesions? No         Large Joint Injection/Arthocentesis: L knee joint  Date/Time: 2/13/2020 2:48 PM  Performed by: Jarocho Alvarado MD  Authorized by: Jarocho Alvarado MD     Indications:  Pain  Needle Size:  22 G  Guidance: landmark guided    Approach:  Anterolateral  Location:  Knee      Medications:  5 mL lidocaine (PF) 1 %; 40 mg triamcinolone 40 MG/ML  Procedure discussed: discussed risks, benefits, and alternatives    Consent Given by:  Patient  Timeout: timeout called immediately prior to procedure    Prep: patient was prepped and draped in usual sterile fashion     There were no complications. The patient tolerated the  procedure well. There was minimal bleeding.   The patient was instructed to ice the knee upon leaving clinic and refrain from overuse over the next 2 days.   The patient was instructed to go to the emergency room with any unusual pain, swelling, or redness occurred in the injected area.     Jarocho Alvarado MD

## 2020-02-13 NOTE — NURSING NOTE
Chief Complaint   Patient presents with     Referral     pt would like a referral to psychiatry     Fungal Infection     pt states he has a fungal infection on both feet       Tarah Scales CMA at 12:43 PM on 2/13/2020.
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
## 2020-02-13 NOTE — NURSING NOTE
[address] [address]  Dept: [phone]  ______________________________________________________________________________    Patient: Andrew Lockwood   : 1967   MRN: [id number]   2020    INVASIVE PROCEDURE SAFETY CHECKLIST    Date: 2020   Procedure:Left knee CSI   Patient Name: Andrew Lockwood  MRN: [id number]  YOB: 1967    Action: Complete sections as appropriate. Any discrepancy results in a HARD COPY until resolved.     PRE PROCEDURE:  Patient ID verified with 2 identifiers (name and  or MRN): Yes  Procedure and site verified with patient/designee (when able): Yes  Accurate consent documentation in medical record: Yes  H&P (or appropriate assessment) documented in medical record: Yes  H&P must be up to 20 days prior to procedure and updates within 24 hours of procedure as applicable: NA  Relevant diagnostic and radiology test results appropriately labeled and displayed as applicable: Yes  Procedure site(s) marked with provider initials: Yes    TIMEOUT:  Time-Out performed immediately prior to starting procedure, including verbal and active participation of all team members addressing the following:Yes  * Correct patient identify  * Confirmed that the correct side and site are marked  * An accurate procedure consent form  * Agreement on the procedure to be done  * Correct patient position  * Relevant images and results are properly labeled and appropriately displayed  * The need to administer antibiotics or fluids for irrigation purposes during the procedure as applicable   * Safety precautions based on patient history or medication use    DURING PROCEDURE: Verification of correct person, site, and procedures any time the responsibility for care of the patient is transferred to another member of the care team.       Prior to injection, verified patient identity using patient's name and date of  birth.  Due to injection administration, patient instructed to remain in clinic for 15 minutes  afterwards, and to report any adverse reaction to me immediately.    Joint injection was performed.      Drug Amount Wasted:  Yes: 1 mg/ml   Vial/Syringe: Single dose vial  Expiration Date:  10/1/23    Clara Jovel ATC  February 13, 2020

## 2020-02-13 NOTE — PROGRESS NOTES
[facility]  Orthopedics  Jarocho Alvarado MD  2020     Name: Andrew Lockwood  MRN: [id number]  Age: 52 year old  : 1967  Referring provider: Referred Self     Chief Complaint: Pain of the Left Knee    History of Present Illness:   Andrew Lockwood is a 52 year old male who presents today for evaluation of left knee pain sustained one week ago without any inciting event or injury. He endorses aching, dull, and throbbing pain in the medial aspect of his left knee. Pain is exacerbated with weightbearing. He is interested in a corticosteroid injection of his left knee.  He has had at least 2 steroid injections previously.  He is not sure when the last injection was, but he believes it was least 6 months ago.  He has not been taking any OTC medications, doing home exercises or physical therapy, or wearing a brace.    Review of Systems:   A 10-point review of systems was obtained and is negative except for as noted in the HPI.     Medications:      bictegravir-emtricitabine-tenofovir (BIKTARVY) -25 MG per tablet, Take 1 tablet by mouth daily, Disp: 30 tablet, Rfl: 11     blood glucose (NO BRAND SPECIFIED) lancets standard, Use to test blood sugar four times daily or as directed., Disp: 100 each, Rfl: 11     blood glucose (NO BRAND SPECIFIED) test strip, 1 strip by In Vitro route 4 times daily (before meals and nightly) Use to test blood sugars 4 times daily or as directed, Disp: 100 strip, Rfl: 11     blood glucose monitoring (NO BRAND SPECIFIED) meter device kit, Use to test blood sugar 4 times daily or as directed., Disp: 1 kit, Rfl: 0     blood glucose monitoring (NO BRAND SPECIFIED) meter device kit, Use to test blood sugar 4 times daily or as directed., Disp: 1 kit, Rfl: 0     cetirizine (ZYRTEC) 10 MG tablet, Take 1 tablet (10 mg) by mouth daily, Disp: 30 tablet, Rfl: 0     Ciclopirox 8 % KIT, Externally apply 1 Application topically daily In evening, remove from nails once weekly with alcohol, Disp: 34  mL, Rfl: 1     famotidine (PEPCID) 20 MG tablet, Take 1 tablet (20 mg) by mouth 2 times daily, Disp: 60 tablet, Rfl: 1     HUMALOG KWIKPEN 100 UNIT/ML soln, Inject 10 - 15 units with meals, plus correction.  Pt uses approx 60 units in 24 hrs., Disp: 15 mL, Rfl: 3     ibuprofen (ADVIL/MOTRIN) 200 MG tablet, Take 600 mg by mouth every 4 hours as needed for mild pain, Disp: , Rfl:      insulin glargine (LANTUS PEN) 100 UNIT/ML pen, Inject 30 Units Subcutaneous 2 times daily, Disp: 15 mL, Rfl: 3     insulin pen needle (B-D U/F) 31G X 8 MM miscellaneous, Use 1 pen needles daily or as directed., Disp: 100 each, Rfl: 3     insulin pen needle (BD PEN NEEDLE SCOTT 2ND GEN) 32G X 4 MM miscellaneous, Use 3-4 daily., Disp: 250 each, Rfl: 3     mupirocin (BACTROBAN) 2 % external ointment, Apply topically 3 times daily, Disp: 30 g, Rfl: 0     Nutritional Supplements (GLUCOSE MANAGEMENT) TABS, Use 1-2 tabs for hypoglycemia., Disp: 30 tablet, Rfl: 3     ondansetron (ZOFRAN-ODT) 4 MG ODT tab, Take 1 tablet (4 mg) by mouth every 6 hours as needed for nausea or vomiting, Disp: 12 tablet, Rfl: 0     SENNA-docusate sodium (SENNA S) 8.6-50 MG tablet, Take 1 tablet by mouth At Bedtime for 7 days, Disp: 7 tablet, Rfl: 0    Current Facility-Administered Medications:      lidocaine (PF) (XYLOCAINE) 1 % injection 4 mL, 4 mL, , , Aguila Cannon, DO, 4 mL at 01/29/20 1606     triamcinolone (KENALOG-40) injection 40 mg, 40 mg, , , Aguila Cannon A, DO, 40 mg at 01/29/20 1606    Allergies:  Metformin; Milk [lac bovis]; and Dulaglutide     Past Medical History:  AIDS  Allergic rhinitis due to other allergen  Chronic abdominal pain  CNC toxoplasmosis  Diabetes type 2  GERD  HIV  Periungual wart  Sleep apnea    Past Surgical History:  Right forearm fracture   Colonoscopy  HC Explore   Laparoscopic Appendectomy  Optical Tracking System   Repair Gamekeepers Thumb    Social History:  He admits to tobacco use and denies alcohol use.     Family  "History:  Positive: Diabetes (brother, father, paternal grandfather), Alzheimer Disease (father)    Physical Examination:  Resp. rate 16, height 1.626 m (5' 4\"), weight 79.8 kg (176 lb).  Patient is alert, No acute distress, pleasant and conversational.    Gait: Antalgic    left knee:   Skin intact. No erythema or ecchymosis.  No effusion or soft tissue swelling.    AROM: Zero to approximately 135  with pain on terminal flexion    Palpation: TTP over the medial and lateral joint lines.    Special Tests:  None    Full Isometric quad strength, extensor mechanism in place     Neurovascularly intact in the lower extremity    Assessment:   52 year old male with recurrent left knee pain likely secondary to degenerative joint disease.  Relatively mild based on x-rays.    Plan:   -Repeat steroid injection today.  See procedure note for details.  Discussed bracing, home exercise, OTC medications as intervention that he can perform on his own in attempts to prolong the time between injections.  Discussed that we can repeat injection after 3 months if necessary.  - Brace for comfort.   - Follow-up with me if pain is persistent or worsens.     Jarocho Alvarado MD    Scribe Disclosure:  Cristóbal KELLY, am serving as a scribe to document services personally performed by Jarocho Alvarado MD at this visit, based upon the provider's statements to me. All documentation has been reviewed by the aforementioned provider prior to being entered into the official medical record.              "

## 2020-02-14 ENCOUNTER — APPOINTMENT (OUTPATIENT)
Dept: CT IMAGING | Facility: CLINIC | Age: 55
End: 2020-02-14
Attending: INTERNAL MEDICINE
Payer: COMMERCIAL

## 2020-02-14 ENCOUNTER — HOSPITAL ENCOUNTER (EMERGENCY)
Facility: CLINIC | Age: 55
Discharge: HOME | End: 2020-02-14
Attending: INTERNAL MEDICINE | Admitting: INTERNAL MEDICINE
Payer: COMMERCIAL

## 2020-02-14 VITALS
BODY MASS INDEX: 25.88 KG/M2 | OXYGEN SATURATION: 100 % | SYSTOLIC BLOOD PRESSURE: 117 MMHG | TEMPERATURE: 97.8 F | WEIGHT: 151.6 LBS | DIASTOLIC BLOOD PRESSURE: 65 MMHG | HEART RATE: 80 BPM | HEIGHT: 64 IN | RESPIRATION RATE: 18 BRPM

## 2020-02-14 DIAGNOSIS — B20 AIDS (ACQUIRED IMMUNE DEFICIENCY SYNDROME) (H): ICD-10-CM

## 2020-02-14 DIAGNOSIS — K59.00 CONSTIPATION, UNSPECIFIED CONSTIPATION TYPE: ICD-10-CM

## 2020-02-14 DIAGNOSIS — R10.32 ABDOMINAL PAIN, LEFT LOWER QUADRANT: ICD-10-CM

## 2020-02-14 LAB
ABO + RH BLD: NORMAL
ABO + RH BLD: NORMAL
ALBUMIN SERPL-MCNC: 3.8 G/DL (ref 3.4–5)
ALBUMIN UR-MCNC: NEGATIVE MG/DL
ALP SERPL-CCNC: 153 U/L (ref 40–150)
ALT SERPL W P-5'-P-CCNC: 28 U/L (ref 0–70)
ANION GAP SERPL CALCULATED.3IONS-SCNC: 7 MMOL/L (ref 3–14)
APPEARANCE UR: CLEAR
AST SERPL W P-5'-P-CCNC: 11 U/L (ref 0–45)
BASOPHILS # BLD AUTO: 0 10E9/L (ref 0–0.2)
BASOPHILS NFR BLD AUTO: 0.3 %
BILIRUB SERPL-MCNC: 0.9 MG/DL (ref 0.2–1.3)
BILIRUB UR QL STRIP: NEGATIVE
BLD GP AB SCN SERPL QL: NORMAL
BLOOD BANK CMNT PATIENT-IMP: NORMAL
BUN SERPL-MCNC: 17 MG/DL (ref 7–30)
CA-I BLD-SCNC: 5.1 MG/DL (ref 4.4–5.2)
CALCIUM SERPL-MCNC: 9.3 MG/DL (ref 8.5–10.1)
CHLORIDE SERPL-SCNC: 103 MMOL/L (ref 94–109)
CO2 BLDCOV-SCNC: 22 MMOL/L (ref 21–28)
CO2 SERPL-SCNC: 24 MMOL/L (ref 20–32)
COLOR UR AUTO: ABNORMAL
CREAT SERPL-MCNC: 0.72 MG/DL (ref 0.66–1.25)
CRP SERPL-MCNC: 5 MG/L (ref 0–8)
DIFFERENTIAL METHOD BLD: ABNORMAL
EOSINOPHIL # BLD AUTO: 0 10E9/L (ref 0–0.7)
EOSINOPHIL NFR BLD AUTO: 0 %
ERYTHROCYTE [DISTWIDTH] IN BLOOD BY AUTOMATED COUNT: 13.6 % (ref 10–15)
ERYTHROCYTE [SEDIMENTATION RATE] IN BLOOD BY WESTERGREN METHOD: 18 MM/H (ref 0–20)
GFR SERPL CREATININE-BSD FRML MDRD: >90 ML/MIN/{1.73_M2}
GLUCOSE BLD-MCNC: 405 MG/DL (ref 70–99)
GLUCOSE BLDC GLUCOMTR-MCNC: 272 MG/DL (ref 70–99)
GLUCOSE BLDC GLUCOMTR-MCNC: 473 MG/DL (ref 70–99)
GLUCOSE SERPL-MCNC: 462 MG/DL (ref 70–99)
GLUCOSE UR STRIP-MCNC: >1000 MG/DL
HCT VFR BLD AUTO: 42.1 % (ref 40–53)
HCT VFR BLD CALC: 39 %PCV (ref 40–53)
HGB BLD CALC-MCNC: 13.3 G/DL (ref 13.3–17.7)
HGB BLD-MCNC: 14.3 G/DL (ref 13.3–17.7)
HGB UR QL STRIP: NEGATIVE
HIV1 RNA # PLAS NAA DL=20: NORMAL {COPIES}/ML
HIV1 RNA SERPL NAA+PROBE-LOG#: NORMAL {LOG_COPIES}/ML
IMM GRANULOCYTES # BLD: 0.1 10E9/L (ref 0–0.4)
IMM GRANULOCYTES NFR BLD: 0.6 %
INR PPP: 1.04 (ref 0.86–1.14)
KETONES UR STRIP-MCNC: NEGATIVE MG/DL
LACTATE BLD-SCNC: 2 MMOL/L (ref 0.7–2)
LEUKOCYTE ESTERASE UR QL STRIP: NEGATIVE
LIPASE SERPL-CCNC: 147 U/L (ref 73–393)
LYMPHOCYTES # BLD AUTO: 1.6 10E9/L (ref 0.8–5.3)
LYMPHOCYTES NFR BLD AUTO: 14.5 %
MCH RBC QN AUTO: 31.2 PG (ref 26.5–33)
MCHC RBC AUTO-ENTMCNC: 34 G/DL (ref 31.5–36.5)
MCV RBC AUTO: 92 FL (ref 78–100)
MONOCYTES # BLD AUTO: 0.3 10E9/L (ref 0–1.3)
MONOCYTES NFR BLD AUTO: 3 %
NEUTROPHILS # BLD AUTO: 8.8 10E9/L (ref 1.6–8.3)
NEUTROPHILS NFR BLD AUTO: 81.6 %
NITRATE UR QL: NEGATIVE
NRBC # BLD AUTO: 0 10*3/UL
NRBC BLD AUTO-RTO: 0 /100
PCO2 BLDV: 38 MM HG (ref 40–50)
PH BLDV: 7.38 PH (ref 7.32–7.43)
PH UR STRIP: 6 PH (ref 5–7)
PLATELET # BLD AUTO: 320 10E9/L (ref 150–450)
PO2 BLDV: 69 MM HG (ref 25–47)
POTASSIUM BLD-SCNC: 3.9 MMOL/L (ref 3.4–5.3)
POTASSIUM SERPL-SCNC: 4 MMOL/L (ref 3.4–5.3)
PROT SERPL-MCNC: 7.6 G/DL (ref 6.8–8.8)
RBC # BLD AUTO: 4.59 10E12/L (ref 4.4–5.9)
SAO2 % BLDV FROM PO2: 93 %
SODIUM BLD-SCNC: 135 MMOL/L (ref 133–144)
SODIUM SERPL-SCNC: 133 MMOL/L (ref 133–144)
SOURCE: ABNORMAL
SP GR UR STRIP: 1.03 (ref 1–1.03)
SPECIMEN EXP DATE BLD: NORMAL
UROBILINOGEN UR STRIP-MCNC: NORMAL MG/DL (ref 0–2)
WBC # BLD AUTO: 10.8 10E9/L (ref 4–11)

## 2020-02-14 PROCEDURE — 25800030 ZZH RX IP 258 OP 636: Performed by: INTERNAL MEDICINE

## 2020-02-14 PROCEDURE — 82803 BLOOD GASES ANY COMBINATION: CPT

## 2020-02-14 PROCEDURE — 96361 HYDRATE IV INFUSION ADD-ON: CPT | Performed by: INTERNAL MEDICINE

## 2020-02-14 PROCEDURE — 25000125 ZZHC RX 250: Performed by: INTERNAL MEDICINE

## 2020-02-14 PROCEDURE — 25000128 H RX IP 250 OP 636: Performed by: STUDENT IN AN ORGANIZED HEALTH CARE EDUCATION/TRAINING PROGRAM

## 2020-02-14 PROCEDURE — 80053 COMPREHEN METABOLIC PANEL: CPT | Performed by: INTERNAL MEDICINE

## 2020-02-14 PROCEDURE — 25000131 ZZH RX MED GY IP 250 OP 636 PS 637: Performed by: INTERNAL MEDICINE

## 2020-02-14 PROCEDURE — 96360 HYDRATION IV INFUSION INIT: CPT | Mod: 59 | Performed by: INTERNAL MEDICINE

## 2020-02-14 PROCEDURE — 85652 RBC SED RATE AUTOMATED: CPT | Performed by: INTERNAL MEDICINE

## 2020-02-14 PROCEDURE — 99285 EMERGENCY DEPT VISIT HI MDM: CPT | Mod: 25 | Performed by: INTERNAL MEDICINE

## 2020-02-14 PROCEDURE — 40000498 ZZHCL STATISTIC POTASSIUM ED POCT

## 2020-02-14 PROCEDURE — 85025 COMPLETE CBC W/AUTO DIFF WBC: CPT | Performed by: INTERNAL MEDICINE

## 2020-02-14 PROCEDURE — 86900 BLOOD TYPING SEROLOGIC ABO: CPT | Performed by: INTERNAL MEDICINE

## 2020-02-14 PROCEDURE — 74177 CT ABD & PELVIS W/CONTRAST: CPT

## 2020-02-14 PROCEDURE — 83690 ASSAY OF LIPASE: CPT | Performed by: INTERNAL MEDICINE

## 2020-02-14 PROCEDURE — 40000502 ZZHCL STATISTIC GLUCOSE ED POCT

## 2020-02-14 PROCEDURE — 25000132 ZZH RX MED GY IP 250 OP 250 PS 637: Performed by: INTERNAL MEDICINE

## 2020-02-14 PROCEDURE — 82330 ASSAY OF CALCIUM: CPT

## 2020-02-14 PROCEDURE — 86140 C-REACTIVE PROTEIN: CPT | Performed by: INTERNAL MEDICINE

## 2020-02-14 PROCEDURE — 81003 URINALYSIS AUTO W/O SCOPE: CPT | Performed by: INTERNAL MEDICINE

## 2020-02-14 PROCEDURE — 83605 ASSAY OF LACTIC ACID: CPT | Performed by: INTERNAL MEDICINE

## 2020-02-14 PROCEDURE — 40000497 ZZHCL STATISTIC SODIUM ED POCT

## 2020-02-14 PROCEDURE — 86901 BLOOD TYPING SEROLOGIC RH(D): CPT | Performed by: INTERNAL MEDICINE

## 2020-02-14 PROCEDURE — 85610 PROTHROMBIN TIME: CPT | Performed by: INTERNAL MEDICINE

## 2020-02-14 PROCEDURE — 96372 THER/PROPH/DIAG INJ SC/IM: CPT | Performed by: INTERNAL MEDICINE

## 2020-02-14 PROCEDURE — 00000146 ZZHCL STATISTIC GLUCOSE BY METER IP

## 2020-02-14 PROCEDURE — 40000141 ZZH STATISTIC PERIPHERAL IV START W/O US GUIDANCE

## 2020-02-14 PROCEDURE — 40000501 ZZHCL STATISTIC HEMATOCRIT ED POCT

## 2020-02-14 PROCEDURE — 86850 RBC ANTIBODY SCREEN: CPT | Performed by: INTERNAL MEDICINE

## 2020-02-14 PROCEDURE — 99285 EMERGENCY DEPT VISIT HI MDM: CPT | Mod: Z6 | Performed by: INTERNAL MEDICINE

## 2020-02-14 RX ORDER — POLYETHYLENE GLYCOL 3350 17 G/17G
1 POWDER, FOR SOLUTION ORAL DAILY PRN
Qty: 527 G | Refills: 0 | Status: ON HOLD | OUTPATIENT
Start: 2020-02-14 | End: 2020-03-08

## 2020-02-14 RX ORDER — ACETAMINOPHEN 325 MG/1
975 TABLET ORAL ONCE
Status: COMPLETED | OUTPATIENT
Start: 2020-02-14 | End: 2020-02-14

## 2020-02-14 RX ORDER — ALUMINA, MAGNESIA, AND SIMETHICONE 2400; 2400; 240 MG/30ML; MG/30ML; MG/30ML
30 SUSPENSION ORAL ONCE
Status: DISCONTINUED | OUTPATIENT
Start: 2020-02-14 | End: 2020-02-14

## 2020-02-14 RX ORDER — IOPAMIDOL 755 MG/ML
92 INJECTION, SOLUTION INTRAVASCULAR ONCE
Status: COMPLETED | OUTPATIENT
Start: 2020-02-14 | End: 2020-02-14

## 2020-02-14 RX ADMIN — SODIUM CHLORIDE 1000 ML: 9 INJECTION, SOLUTION INTRAVENOUS at 11:25

## 2020-02-14 RX ADMIN — INSULIN ASPART 8 UNITS: 100 INJECTION, SOLUTION INTRAVENOUS; SUBCUTANEOUS at 10:52

## 2020-02-14 RX ADMIN — LIDOCAINE HYDROCHLORIDE 30 ML: 20 SOLUTION ORAL; TOPICAL at 13:33

## 2020-02-14 RX ADMIN — ACETAMINOPHEN 975 MG: 325 TABLET, FILM COATED ORAL at 13:34

## 2020-02-14 RX ADMIN — IOPAMIDOL 92 ML: 755 INJECTION, SOLUTION INTRAVENOUS at 12:54

## 2020-02-14 ASSESSMENT — ENCOUNTER SYMPTOMS
VOMITING: 0
ABDOMINAL PAIN: 1
NAUSEA: 1

## 2020-02-14 ASSESSMENT — MIFFLIN-ST. JEOR: SCORE: 1438.65

## 2020-02-14 NOTE — PROGRESS NOTES
Emergency Social Work Services Note    Date of  Intervention: 02/14/20  Last Emergency Department Visit:  02/07/20  Care Plan:  None  Collaborated with:  Patient and Sober Living Facility.     Data:  Andrew Lockwood is a 52 year old male with a a history of type 2 diabetes mellitus, HIV/AIDS, CNS toxoplasmosis, and recurrent SBOs who presents to the Emergency Department with abdominal pain and nausea. ED SW was asked to get involved to assess supportive services needed.     Intervention:  I met with Andrew today to discuss housing needs. He reports that he just completed an inpatient program and is now in sober living. He requested that I call his sober living facility and inform them that he is in the ED at this time. I called and spoke with Son, . He was unaware the patient was in the ED but noted that the sober housing facility is totally independent and he is free to come and go as he needs.     Assessment:  Andrew was appreciated of the SW visit and did not have any additional needs.     Plan:    Anticipated Disposition:  Home, no needs identified    Barriers to d/c plan:  Medical Clearance    Follow Up:  None    Sober Living Housing  Rob Ahuja  [address] [place] [id number]  [phone]  Son- of Program

## 2020-02-14 NOTE — ED AVS SNAPSHOT
[facility], [place], Emergency Department  [address] [place] [id number]  Phone:  [phone]                                    Andrew Lockwood   MRN: [id number]    Department:  [facility], Emergency Department   Date of Visit:  2/14/2020           After Visit Summary Signature Page    I have received my discharge instructions, and my questions have been answered. I have discussed any challenges I see with this plan with the nurse or doctor.    ..........................................................................................................................................  Patient/Patient Representative Signature      ..........................................................................................................................................  Patient Representative Print Name and Relationship to Patient    ..................................................               ................................................  Date                                   Time    ..........................................................................................................................................  Reviewed by Signature/Title    ...................................................              ..............................................  Date                                               Time          22EPIC Rev 08/18

## 2020-02-14 NOTE — ED TRIAGE NOTES
Pt arrived to ED c/o nausea and abdominal pain. Pt states that he had cortisone shot in knee and concerned about blood sugar. Nurse checked BG

## 2020-02-14 NOTE — ED PROVIDER NOTES
"    [place] EMERGENCY DEPARTMENT ([facility])  2/14/20    History     Chief Complaint   Patient presents with     Abdominal Pain     Nausea     The history is provided by the patient and medical records.     Andrew Lockwood is a 52 year old male with a history of type 2 diabetes mellitus, HIV/AIDS, CNS toxoplasmosis, and recurrent SBOs who presents to the Emergency Department with abdominal pain and nausea. Patient reports LLQ pain since this morning. Patient states he has nausea, but denies vomiting. Patient's last BM was around 3 am this morning and he states it was \"dark.\" Patient reports he got a cortisone injection in his knee yesterday and now his sugars are high.    I have reviewed the Medications, Allergies, Past Medical and Surgical History, and Social History in the [facility] system.    Past Medical History:   Diagnosis Date     AIDS (H)      Allergic rhinitis due to other allergen     DNS     Chronic abdominal pain      CNS toxoplasmosis (H)      Diabetes type 2, controlled (H)      GERD (gastroesophageal reflux disease)      HIV (human immunodeficiency virus infection) (H)      Periungual wart      Sleep apnea     doesn't use CPAP       Past Surgical History:   Procedure Laterality Date     C NONSPECIFIC PROCEDURE      right forearm fracture     COLONOSCOPY Left 1/22/2016    Procedure: COMBINED COLONOSCOPY, SINGLE OR MULTIPLE BIOPSY/POLYPECTOMY BY BIOPSY;  Surgeon: Clark Saini MD;  Location: [facility] GI     HC EXPLORE UNDESC TESTIS,INGUIN/SCROTAL       LAPAROSCOPIC APPENDECTOMY N/A 1/31/2018    Procedure: LAPAROSCOPIC APPENDECTOMY;  LAPAROSCOPIC APPENDECTOMY;  Surgeon: Dawn Holt MD;  Location: [facility] OR     LAPAROSCOPY DIAGNOSTIC (GENERAL) N/A 7/26/2016    Procedure: LAPAROSCOPY DIAGNOSTIC (GENERAL);  Surgeon: Susannah Arriaga MD;  Location: [facility] OR     LAPAROSCOPY DIAGNOSTIC (GENERAL) N/A 4/16/2018    Procedure: LAPAROSCOPY DIAGNOSTIC (GENERAL);  Diagnostic laparoscopy and lysis " of adhesions;  Surgeon: Prince Dowling MD;  Location: [facility] OR     OPTICAL TRACKING SYSTEM CRANIOTOMY, EXCISE TUMOR, COMBINED Left 4/10/2015    Procedure: COMBINED OPTICAL TRACKING SYSTEM CRANIOTOMY, EXCISE TUMOR;  Surgeon: Mirlande Colmenares MD;  Location: [facility] OR     REPAIR GAMEKEEPER'S THUMB Right 12/2/2016    Procedure: REPAIR LIGAMENT ULNAR COLLATERAL THUMB (GAMEKEEPER'S);  Surgeon: Evin Zamorano MD;  [facility] OR       Family History   Problem Relation Age of Onset     Diabetes Brother      Diabetes Father      Alzheimer Disease Father      Unknown/Adopted Mother      Diabetes Paternal Grandfather      Cancer No family hx of         no skin cancer     Skin Cancer No family hx of         no famiy hx of skin cancer     Glaucoma No family hx of      Macular Degeneration No family hx of        Social History     Tobacco Use     Smoking status: Current Some Day Smoker     Packs/day: 0.25     Types: Cigarettes     Last attempt to quit: 10/28/2016     Years since quitting: 3.2     Smokeless tobacco: Former User   Substance Use Topics     Alcohol use: No     Alcohol/week: 0.0 standard drinks     Comment: Last etoh in 2007       Current Facility-Administered Medications   Medication     lidocaine (PF) (XYLOCAINE) 1 % injection 4 mL     triamcinolone (KENALOG-40) injection 40 mg     Current Outpatient Medications   Medication     polyethylene glycol (MIRALAX) powder     bictegravir-emtricitabine-tenofovir (BIKTARVY) -25 MG per tablet     blood glucose (NO BRAND SPECIFIED) lancets standard     blood glucose (NO BRAND SPECIFIED) test strip     blood glucose monitoring (NO BRAND SPECIFIED) meter device kit     blood glucose monitoring (NO BRAND SPECIFIED) meter device kit     cetirizine (ZYRTEC) 10 MG tablet     Ciclopirox 8 % KIT     famotidine (PEPCID) 20 MG tablet     HUMALOG KWIKPEN 100 UNIT/ML soln     ibuprofen (ADVIL/MOTRIN) 200 MG tablet     insulin glargine (LANTUS PEN) 100 UNIT/ML pen      "insulin pen needle (B-D U/F) 31G X 8 MM miscellaneous     insulin pen needle (BD PEN NEEDLE SCOTT 2ND GEN) 32G X 4 MM miscellaneous     mupirocin (BACTROBAN) 2 % external ointment     Nutritional Supplements (GLUCOSE MANAGEMENT) TABS     ondansetron (ZOFRAN-ODT) 4 MG ODT tab     SENNA-docusate sodium (SENNA S) 8.6-50 MG tablet        Allergies   Allergen Reactions     Metformin      Abdominal pain     Milk [Lac Bovis] Hives     Dulaglutide Rash       Review of Systems   Gastrointestinal: Positive for abdominal pain (LLQ) and nausea. Negative for vomiting.   All other systems reviewed and are negative.      Physical Exam   BP: 129/88  Pulse: 95  Temp: 97.8  F (36.6  C)  Resp: 18  Height: 162.6 cm (5' 4\")  Weight: 68.8 kg (151 lb 9.6 oz)  SpO2: 98 %      Physical Exam  Constitutional:       General: He is not in acute distress.     Appearance: He is not diaphoretic.   HENT:      Head: Atraumatic.      Mouth/Throat:      Mouth: Mucous membranes are dry.   Eyes:      General: No scleral icterus.     Pupils: Pupils are equal, round, and reactive to light.   Neck:      Musculoskeletal: Neck supple.   Cardiovascular:      Rate and Rhythm: Normal rate and regular rhythm.      Heart sounds: Normal heart sounds. No murmur. No friction rub. No gallop.    Pulmonary:      Effort: Pulmonary effort is normal. No respiratory distress.      Breath sounds: Normal breath sounds. No stridor. No wheezing, rhonchi or rales.   Chest:      Chest wall: No tenderness.   Abdominal:      General: Abdomen is flat. Bowel sounds are normal.      Palpations: Abdomen is soft.      Tenderness: There is abdominal tenderness in the left lower quadrant. There is no right CVA tenderness, left CVA tenderness, guarding or rebound. Negative signs include Mendes's sign, Rovsing's sign, McBurney's sign, psoas sign and obturator sign.      Hernia: No hernia is present.       Musculoskeletal:         General: No tenderness.   Skin:     General: Skin is warm.    "   Findings: No rash.         ED Course   9:37 AM  The patient was seen and examined by Zaire Oropeza MD in Room ED38.        Procedures        Results for orders placed or performed during the hospital encounter of 02/14/20 (from the past 24 hour(s))   Glucose by meter     Status: Abnormal    Collection Time: 02/14/20  9:28 AM   Result Value Ref Range    Glucose 473 (H) 70 - 99 mg/dL   CBC with platelets differential     Status: Abnormal    Collection Time: 02/14/20 10:32 AM   Result Value Ref Range    WBC 10.8 4.0 - 11.0 10e9/L    RBC Count 4.59 4.4 - 5.9 10e12/L    Hemoglobin 14.3 13.3 - 17.7 g/dL    Hematocrit 42.1 40.0 - 53.0 %    MCV 92 78 - 100 fl    MCH 31.2 26.5 - 33.0 pg    MCHC 34.0 31.5 - 36.5 g/dL    RDW 13.6 10.0 - 15.0 %    Platelet Count 320 150 - 450 10e9/L    Diff Method Automated Method     % Neutrophils 81.6 %    % Lymphocytes 14.5 %    % Monocytes 3.0 %    % Eosinophils 0.0 %    % Basophils 0.3 %    % Immature Granulocytes 0.6 %    Nucleated RBCs 0 0 /100    Absolute Neutrophil 8.8 (H) 1.6 - 8.3 10e9/L    Absolute Lymphocytes 1.6 0.8 - 5.3 10e9/L    Absolute Monocytes 0.3 0.0 - 1.3 10e9/L    Absolute Eosinophils 0.0 0.0 - 0.7 10e9/L    Absolute Basophils 0.0 0.0 - 0.2 10e9/L    Abs Immature Granulocytes 0.1 0 - 0.4 10e9/L    Absolute Nucleated RBC 0.0    INR     Status: None    Collection Time: 02/14/20 10:32 AM   Result Value Ref Range    INR 1.04 0.86 - 1.14   ABO/Rh type and screen     Status: None    Collection Time: 02/14/20 10:32 AM   Result Value Ref Range    ABO O     RH(D) Pos     Antibody Screen Neg     Test Valid Only At          [facility]    Specimen Expires 02/17/2020    Comprehensive metabolic panel     Status: Abnormal    Collection Time: 02/14/20 10:32 AM   Result Value Ref Range    Sodium 133 133 - 144 mmol/L    Potassium 4.0 3.4 - 5.3 mmol/L    Chloride 103 94 - 109 mmol/L    Carbon Dioxide 24 20 - 32 mmol/L    Anion Gap 7 3 - 14  mmol/L    Glucose 462 (H) 70 - 99 mg/dL    Urea Nitrogen 17 7 - 30 mg/dL    Creatinine 0.72 0.66 - 1.25 mg/dL    GFR Estimate >90 >60 mL/min/[1.73_m2]    GFR Estimate If Black >90 >60 mL/min/[1.73_m2]    Calcium 9.3 8.5 - 10.1 mg/dL    Bilirubin Total 0.9 0.2 - 1.3 mg/dL    Albumin 3.8 3.4 - 5.0 g/dL    Protein Total 7.6 6.8 - 8.8 g/dL    Alkaline Phosphatase 153 (H) 40 - 150 U/L    ALT 28 0 - 70 U/L    AST 11 0 - 45 U/L   Lipase     Status: None    Collection Time: 02/14/20 10:32 AM   Result Value Ref Range    Lipase 147 73 - 393 U/L   CRP inflammation     Status: None    Collection Time: 02/14/20 10:32 AM   Result Value Ref Range    CRP Inflammation 5.0 0.0 - 8.0 mg/L   Erythrocyte sedimentation rate auto     Status: None    Collection Time: 02/14/20 10:32 AM   Result Value Ref Range    Sed Rate 18 0 - 20 mm/h   Lactic acid whole blood     Status: None    Collection Time: 02/14/20 10:33 AM   Result Value Ref Range    Lactic Acid 2.0 0.7 - 2.0 mmol/L   ISTAT gases elec ica gluc eriberto POCT     Status: Abnormal    Collection Time: 02/14/20 11:45 AM   Result Value Ref Range    Ph Venous 7.38 7.32 - 7.43 pH    PCO2 Venous 38 (L) 40 - 50 mm Hg    PO2 Venous 69 (H) 25 - 47 mm Hg    Bicarbonate Venous 22 21 - 28 mmol/L    O2 Sat Venous 93 %    Sodium 135 133 - 144 mmol/L    Potassium 3.9 3.4 - 5.3 mmol/L    Glucose 405 (H) 70 - 99 mg/dL    Calcium Ionized 5.1 4.4 - 5.2 mg/dL    Hemoglobin 13.3 13.3 - 17.7 g/dL    Hematocrit - POCT 39 (L) 40.0 - 53.0 %PCV   UA reflex to Microscopic and Culture     Status: Abnormal    Collection Time: 02/14/20 11:48 AM   Result Value Ref Range    Color Urine Light Yellow     Appearance Urine Clear     Glucose Urine >1000 (A) NEG^Negative mg/dL    Bilirubin Urine Negative NEG^Negative    Ketones Urine Negative NEG^Negative mg/dL    Specific Gravity Urine 1.035 1.003 - 1.035    Blood Urine Negative NEG^Negative    pH Urine 6.0 5.0 - 7.0 pH    Protein Albumin Urine Negative NEG^Negative mg/dL     Urobilinogen mg/dL Normal 0.0 - 2.0 mg/dL    Nitrite Urine Negative NEG^Negative    Leukocyte Esterase Urine Negative NEG^Negative    Source Midstream Urine    CT Abdomen Pelvis w Contrast     Status: None    Collection Time: 02/14/20  1:15 PM    Narrative    EXAMINATION: CT ABDOMEN PELVIS W CONTRAST, 2/14/2020 1:15 PM    TECHNIQUE:  Helical CT images from the lung bases through the  symphysis pubis were obtained with IV contrast. Contrast dose:  iopamidol (ISOVUE-370) solution 92 mL    COMPARISON: CT abdomen pelvis 2/7/2020    HISTORY: Abd pain, diverticulitis suspected    FINDINGS:    Abdomen and pelvis: The liver, gallbladder, pancreas, spleen and  adrenal glands are unremarkable. Normal enhancement of both kidneys.  No hydronephrosis or hydroureter. No renal or ureteral stones. Urinary  bladder is unremarkable. The prostate is enlarged, and impresses upon  the posterior aspect of the urinary bladder. Penile prosthesis with  reservoir in the upper abdomen. There are no abnormally dilated loops  of small or large bowel. Scattered colonic diverticula, without  evidence of acute diverticulitis. There are several loops of mildly  prominent fluid-filled small bowel in the mid abdomen. The appendix is  surgically absent. No free fluid or free intraperitoneal air.  Abdominal aorta is normal in caliber. There are no enlarged inguinal  or intra-abdominal lymph nodes.    Lung bases: Heart is normal in size. No pericardial effusion. Patchy  groundglass attenuation in the lower lobes, slightly improved compared  to 7/20/2020.    Bones and soft tissues: Degenerative changes of the spine. Mild  anterior wedging the T10 vertebral body also unchanged. No acute or  suspicious osseous lesions.      Impression    IMPRESSION:   1. No acute findings in the abdomen or pelvis.  2. Colonic diverticulosis, without evidence of acute diverticulitis.  3. Further decreased patchy groundglass opacities in the lung bases,  likely resolving  infection.    I have personally reviewed the examination and initial interpretation  and I agree with the findings.    JOSE VITAL, DO   Glucose by meter     Status: Abnormal    Collection Time: 02/14/20  2:22 PM   Result Value Ref Range    Glucose 272 (H) 70 - 99 mg/dL           Labs Ordered and Resulted from Time of ED Arrival Up to the Time of Departure from the ED   GLUCOSE BY METER - Abnormal; Notable for the following components:       Result Value    Glucose 473 (*)     All other components within normal limits   CBC WITH PLATELETS DIFFERENTIAL - Abnormal; Notable for the following components:    Absolute Neutrophil 8.8 (*)     All other components within normal limits   COMPREHENSIVE METABOLIC PANEL - Abnormal; Notable for the following components:    Glucose 462 (*)     Alkaline Phosphatase 153 (*)     All other components within normal limits   UA MACROSCOPIC WITH REFLEX TO MICRO AND CULTURE - Abnormal; Notable for the following components:    Glucose Urine >1000 (*)     All other components within normal limits   GLUCOSE BY METER - Abnormal; Notable for the following components:    Glucose 272 (*)     All other components within normal limits   ISTAT GASES ELEC ICA GLUC AMNA POCT - Abnormal; Notable for the following components:    PCO2 Venous 38 (*)     PO2 Venous 69 (*)     Glucose 405 (*)     Hematocrit - POCT 39 (*)     All other components within normal limits   INR   LIPASE   LACTIC ACID WHOLE BLOOD   CRP INFLAMMATION   ERYTHROCYTE SEDIMENTATION RATE AUTO   ISTAT CG8 GAS ELEC ICA GLUC AMNA NURSE POCT   ABO/RH TYPE AND SCREEN            Assessments & Plan (with Medical Decision Making)  Recurrent LLQ abd pain with nausea in the pt with AIDS with good CD 4 count recently, labs and CT without acute pathologies, improved after tylenol GI cocktail, will discharge with miralax prn, follow up with his PMD's. SW also saw him today and go back to shelter.       I have reviewed the nursing notes.    I have  reviewed the findings, diagnosis, plan and need for follow up with the patient.    Discharge Medication List as of 2/14/2020  2:15 PM      START taking these medications    Details   polyethylene glycol (MIRALAX) powder Take 17 g (1 capful) by mouth daily as needed for constipation, Disp-527 g, R-0, Local Print             Final diagnoses:   Constipation, unspecified constipation type   Abdominal pain, left lower quadrant   AIDS (acquired immune deficiency syndrome) (H)     ICielo, am serving as a trained medical scribe to document services personally performed by Zaire Oropeza MD, based on the provider's statements to me.      IZaire MD, was physically present and have reviewed and verified the accuracy of this note documented by Cielo Salcedo.     2/14/2020   [facility], [place], EMERGENCY DEPARTMENT     Zaire Oropeza MD  02/14/20 [id number]

## 2020-02-17 ENCOUNTER — HOSPITAL ENCOUNTER (OUTPATIENT)
Facility: AMBULATORY SURGERY CENTER | Age: 55
End: 2020-02-17
Attending: COLON & RECTAL SURGERY
Payer: COMMERCIAL

## 2020-02-17 DIAGNOSIS — K62.82 ANAL DYSPLASIA: ICD-10-CM

## 2020-02-17 NOTE — TELEPHONE ENCOUNTER
FUTURE VISIT INFORMATION      SURGERY INFORMATION:    Date: 3/13/20    [facility] OR    Surgeon:  Thanh Lundberg MD    Anesthesia Type:  MAC    Procedure: EXAM UNDER ANESTHESIA, ANUS HIGH RESOLUTION ANOSCOPY with POSSIBLE FULGURATION, POSSIBLE BIOPSY    Consult: ov 1/3/19    RECORDS REQUESTED FROM:       Primary Care Provider: Caitie Lamb MD- [facility]    Pertinent Medical History: Pulmonary nodule    Most recent EKG+ Tracin20

## 2020-02-18 ENCOUNTER — OFFICE VISIT (OUTPATIENT)
Dept: INFECTIOUS DISEASES | Facility: CLINIC | Age: 55
End: 2020-02-18
Attending: INTERNAL MEDICINE
Payer: COMMERCIAL

## 2020-02-18 ENCOUNTER — PRE VISIT (OUTPATIENT)
Dept: SURGERY | Facility: CLINIC | Age: 55
End: 2020-02-18

## 2020-02-18 VITALS
SYSTOLIC BLOOD PRESSURE: 132 MMHG | DIASTOLIC BLOOD PRESSURE: 86 MMHG | TEMPERATURE: 98.2 F | BODY MASS INDEX: 25.61 KG/M2 | HEART RATE: 97 BPM | HEIGHT: 64 IN | WEIGHT: 150 LBS

## 2020-02-18 DIAGNOSIS — B35.1 ONYCHOMYCOSIS: ICD-10-CM

## 2020-02-18 DIAGNOSIS — B20 HUMAN IMMUNODEFICIENCY VIRUS (HIV) DISEASE (H): Primary | ICD-10-CM

## 2020-02-18 DIAGNOSIS — F17.219 CIGARETTE NICOTINE DEPENDENCE WITH NICOTINE-INDUCED DISORDER: ICD-10-CM

## 2020-02-18 PROCEDURE — G0463 HOSPITAL OUTPT CLINIC VISIT: HCPCS | Mod: ZF

## 2020-02-18 RX ORDER — NICOTINE 21 MG/24HR
1 PATCH, TRANSDERMAL 24 HOURS TRANSDERMAL EVERY 24 HOURS
Qty: 28 PATCH | Refills: 0 | Status: SHIPPED | OUTPATIENT
Start: 2020-02-18 | End: 2020-04-27

## 2020-02-18 RX ORDER — TAVABOROLE TOPICAL SOLUTION, 5% 43.5 MG/ML
1 SOLUTION TOPICAL DAILY
Qty: 10 ML | Refills: 2 | Status: SHIPPED | OUTPATIENT
Start: 2020-02-18 | End: 2020-04-27

## 2020-02-18 RX ORDER — TRIAMCINOLONE ACETONIDE 40 MG/ML
40 INJECTION, SUSPENSION INTRA-ARTICULAR; INTRAMUSCULAR
Status: DISCONTINUED | OUTPATIENT
Start: 2020-02-13 | End: 2020-02-26

## 2020-02-18 RX ORDER — TERBINAFINE HYDROCHLORIDE 250 MG/1
250 TABLET ORAL DAILY
Qty: 90 TABLET | Refills: 0 | Status: SHIPPED | OUTPATIENT
Start: 2020-02-18 | End: 2020-04-27

## 2020-02-18 RX ORDER — LIDOCAINE HYDROCHLORIDE 10 MG/ML
5 INJECTION, SOLUTION EPIDURAL; INFILTRATION; INTRACAUDAL; PERINEURAL
Status: DISCONTINUED | OUTPATIENT
Start: 2020-02-13 | End: 2020-02-26

## 2020-02-18 ASSESSMENT — PAIN SCALES - GENERAL: PAINLEVEL: NO PAIN (0)

## 2020-02-18 ASSESSMENT — MIFFLIN-ST. JEOR: SCORE: 1431.4

## 2020-02-18 NOTE — Clinical Note
2/18/2020      RE: Andrew Lockwood  [address] [place] [id number]         Planned short interval follow-up for HIV    Ayala Prieto MD

## 2020-02-18 NOTE — NURSING NOTE
"/86   Pulse 97   Temp 98.2  F (36.8  C) (Oral)   Ht 1.626 m (5' 4\")   Wt 68 kg (150 lb)   BMI 25.75 kg/m    Chief Complaint   Patient presents with     RECHECK     follow up with alexander DIAZ cma       "

## 2020-02-18 NOTE — LETTER
Date:March 10, 2020      Patient was self referred, no letter generated. Do not send.        [facility] Information

## 2020-02-19 NOTE — PROGRESS NOTES
INFECTIOUS DISEASE NOTE    Planned short interval follow-up for HIV      Andrew returns today, a week after having laboratory testing completed.    Orders Only on 02/11/2020   Component Date Value Ref Range Status     Specimen Descrip 02/11/2020 Urine   Final     N Gonorrhea PCR 02/11/2020 Negative  NEG^Negative Final     Specimen Description 02/11/2020 Urine   Final     Chlamydia Trachomatis PCR 02/11/2020 Negative  NEG^Negative Final     Hemoglobin A1C 02/11/2020 11.6* 0 - 5.6 % Final     Treponema Antibodies 02/11/2020 Nonreactive  NR^Nonreactive Final     Hepatitis C Antibody 02/11/2020 Nonreactive  NR^Nonreactive Final     HIV-1 RNA Quant Result 02/11/2020 HIV-1 RNA Not Detected  HIVND^HIV-1 RNA Not Detected [Copies]/mL Final     HIV RNA QT Log 02/11/2020 Not Calculated  <1.3 [Log_copies]/mL Final     IFC Specimen 02/11/2020 Blood   Final     CD3 Mature T 02/11/2020 57  49 - 84 % Final     CD4 [place] T 02/11/2020 17* 28 - 63 % Final     CD8 Suppressor T 02/11/2020 37  10 - 40 % Final     CD4:CD8 Ratio 02/11/2020 0.46* 1.40 - 2.60 Final     Absolute CD3 02/11/2020 1,598  603 - 2,990 cells/uL Final     Absolute CD4 02/11/2020 490  441 - 2,156 cells/uL Final     Absolute CD8 02/11/2020 1,036  125 - 1,312 cells/uL Final     Sodium 02/11/2020 130* 133 - 144 mmol/L Final     Potassium 02/11/2020 3.8  3.4 - 5.3 mmol/L Final     Chloride 02/11/2020 98  94 - 109 mmol/L Final     Carbon Dioxide 02/11/2020 25  20 - 32 mmol/L Final     Anion Gap 02/11/2020 7  3 - 14 mmol/L Final     Glucose 02/11/2020 434* 70 - 99 mg/dL Final     Urea Nitrogen 02/11/2020 22  7 - 30 mg/dL Final     Creatinine 02/11/2020 0.88  0.66 - 1.25 mg/dL Final     GFR Estimate 02/11/2020 >90  >60 mL/min/[1.73_m2] Final     GFR Estimate If Black 02/11/2020 >90  >60 mL/min/[1.73_m2] Final     Calcium 02/11/2020 8.7  8.5 - 10.1 mg/dL Final     Bilirubin Total 02/11/2020 0.7  0.2 - 1.3 mg/dL Final     Albumin 02/11/2020 3.7  3.4 - 5.0 g/dL Final      Protein Total 02/11/2020 7.5  6.8 - 8.8 g/dL Final     Alkaline Phosphatase 02/11/2020 132  40 - 150 U/L Final     ALT 02/11/2020 30  0 - 70 U/L Final     AST 02/11/2020 16  0 - 45 U/L Final     WBC 02/11/2020 9.4  4.0 - 11.0 10e9/L Final     RBC Count 02/11/2020 4.55  4.4 - 5.9 10e12/L Final     Hemoglobin 02/11/2020 14.1  13.3 - 17.7 g/dL Final     Hematocrit 02/11/2020 41.3  40.0 - 53.0 % Final     MCV 02/11/2020 91  78 - 100 fl Final     MCH 02/11/2020 31.0  26.5 - 33.0 pg Final     MCHC 02/11/2020 34.1  31.5 - 36.5 g/dL Final     RDW 02/11/2020 13.7  10.0 - 15.0 % Final     Platelet Count 02/11/2020 303  150 - 450 10e9/L Final     Diff Method 02/11/2020 Automated Method   Final     % Neutrophils 02/11/2020 61.2  % Final     % Lymphocytes 02/11/2020 28.2  % Final     % Monocytes 02/11/2020 7.0  % Final     % Eosinophils 02/11/2020 1.9  % Final     % Basophils 02/11/2020 0.7  % Final     % Immature Granulocytes 02/11/2020 1.0  % Final     Nucleated RBCs 02/11/2020 0  0 /100 Final     Absolute Neutrophil 02/11/2020 5.8  1.6 - 8.3 10e9/L Final     Absolute Lymphocytes 02/11/2020 2.7  0.8 - 5.3 10e9/L Final     Absolute Monocytes 02/11/2020 0.7  0.0 - 1.3 10e9/L Final     Absolute Eosinophils 02/11/2020 0.2  0.0 - 0.7 10e9/L Final     Absolute Basophils 02/11/2020 0.1  0.0 - 0.2 10e9/L Final     Abs Immature Granulocytes 02/11/2020 0.1  0 - 0.4 10e9/L Final     Absolute Nucleated RBC 02/11/2020 0.0   Final   Orders Only on 02/11/2020   Component Date Value Ref Range Status     TSH 02/11/2020 2.66  0.40 - 4.00 mU/L Final   Office Visit on 02/11/2020       Andrew was delighted to receive the news that he was undetectable, with a stable absolute CD4 count.    He will continue on Biktarvy and return to see me in 1 month to follow-up on adherence.          Ayala Prieto MD, MS  Infectious Disease    Time:  I spent 25 minutes in direct care with this patient, including >50% of time face-to-face with the patient in  counseling.

## 2020-02-20 NOTE — TELEPHONE ENCOUNTER
FUTURE VISIT INFORMATION      SURGERY INFORMATION:    Date: 3/13/20    [facility]    Surgeon:  EXAM UNDER ANESTHESIA, ANUS    Anesthesia Type:  MAC    Procedure: EXAM UNDER ANESTHESIA, ANUS HIGH RESOLUTION ANOSCOPY with POSSIBLE FULGURATION, POSSIBLE BIOPSY    Consult: ov 1/3/19     RECORDS REQUESTED FROM:         Primary Care Provider: Caitie Lamb MD- [facility]     Pertinent Medical History: Pulmonary nodule     Most recent EKG+ Tracin20

## 2020-02-23 ENCOUNTER — HOSPITAL ENCOUNTER (EMERGENCY)
Facility: CLINIC | Age: 55
Discharge: HOME | End: 2020-02-24
Attending: EMERGENCY MEDICINE | Admitting: EMERGENCY MEDICINE
Payer: COMMERCIAL

## 2020-02-23 DIAGNOSIS — L73.1 INGROWN HAIR: ICD-10-CM

## 2020-02-23 DIAGNOSIS — R73.9 HYPERGLYCEMIA: ICD-10-CM

## 2020-02-23 DIAGNOSIS — L03.115 CELLULITIS OF RIGHT LOWER EXTREMITY: ICD-10-CM

## 2020-02-23 LAB — GLUCOSE BLDC GLUCOMTR-MCNC: 268 MG/DL (ref 70–99)

## 2020-02-23 PROCEDURE — 99283 EMERGENCY DEPT VISIT LOW MDM: CPT | Mod: 25 | Performed by: EMERGENCY MEDICINE

## 2020-02-23 PROCEDURE — 10140 I&D HMTMA SEROMA/FLUID COLLJ: CPT | Performed by: EMERGENCY MEDICINE

## 2020-02-23 PROCEDURE — 00000146 ZZHCL STATISTIC GLUCOSE BY METER IP

## 2020-02-23 PROCEDURE — 99284 EMERGENCY DEPT VISIT MOD MDM: CPT | Mod: 25 | Performed by: EMERGENCY MEDICINE

## 2020-02-23 PROCEDURE — 10140 I&D HMTMA SEROMA/FLUID COLLJ: CPT | Mod: Z6 | Performed by: EMERGENCY MEDICINE

## 2020-02-23 ASSESSMENT — MIFFLIN-ST. JEOR: SCORE: 1431.4

## 2020-02-23 NOTE — ED AVS SNAPSHOT
[facility], [place], Emergency Department  [address] [place] [id number]  Phone:  [phone]                                    Andrew Lockwood   MRN: [id number]    Department:  [facility], Emergency Department   Date of Visit:  2/23/2020           After Visit Summary Signature Page    I have received my discharge instructions, and my questions have been answered. I have discussed any challenges I see with this plan with the nurse or doctor.    ..........................................................................................................................................  Patient/Patient Representative Signature      ..........................................................................................................................................  Patient Representative Print Name and Relationship to Patient    ..................................................               ................................................  Date                                   Time    ..........................................................................................................................................  Reviewed by Signature/Title    ...................................................              ..............................................  Date                                               Time          22EPIC Rev 08/18

## 2020-02-24 ENCOUNTER — TELEPHONE (OUTPATIENT)
Dept: ENDOCRINOLOGY | Facility: CLINIC | Age: 55
End: 2020-02-24

## 2020-02-24 VITALS
BODY MASS INDEX: 25.61 KG/M2 | OXYGEN SATURATION: 97 % | DIASTOLIC BLOOD PRESSURE: 82 MMHG | WEIGHT: 150 LBS | SYSTOLIC BLOOD PRESSURE: 111 MMHG | RESPIRATION RATE: 18 BRPM | TEMPERATURE: 98.5 F | HEIGHT: 64 IN

## 2020-02-24 PROCEDURE — 25000132 ZZH RX MED GY IP 250 OP 250 PS 637: Performed by: EMERGENCY MEDICINE

## 2020-02-24 RX ORDER — DOXYCYCLINE 100 MG/1
100 CAPSULE ORAL ONCE
Status: COMPLETED | OUTPATIENT
Start: 2020-02-24 | End: 2020-02-24

## 2020-02-24 RX ORDER — LIDOCAINE HYDROCHLORIDE AND EPINEPHRINE 10; 10 MG/ML; UG/ML
INJECTION, SOLUTION INFILTRATION; PERINEURAL
Status: DISCONTINUED
Start: 2020-02-24 | End: 2020-02-24 | Stop reason: HOSPADM

## 2020-02-24 RX ORDER — DOXYCYCLINE 100 MG/1
100 CAPSULE ORAL 2 TIMES DAILY
Qty: 14 CAPSULE | Refills: 0 | Status: ON HOLD | OUTPATIENT
Start: 2020-02-24 | End: 2020-03-08

## 2020-02-24 RX ADMIN — DOXYCYCLINE 100 MG: 100 CAPSULE ORAL at 00:55

## 2020-02-24 ASSESSMENT — ENCOUNTER SYMPTOMS
BACK PAIN: 0
ENDOCRINE COMMENTS: +HYPERGLYCEMIA
CONFUSION: 0
BRUISES/BLEEDS EASILY: 0
COLOR CHANGE: 1
CHILLS: 0
NAUSEA: 0
SHORTNESS OF BREATH: 0
FEVER: 0
WEAKNESS: 0

## 2020-02-24 NOTE — ED PROVIDER NOTES
[facility] EMERGENCY DEPARTMENT ([facility])  2/23/20    History     Chief Complaint   Patient presents with     Hyperglycemia     The history is provided by the patient and medical records.     Andrew Lockwood is a 52 year old male with a history of type 2 diabetes, HIV/AIDS, CNS toxoplasmosis, and recurrent SBOs who presents to the Emergency Department with an ingrown hair and concern of hyperglycemia. Patient reports he has had an ingrown hair on his right thigh for the past 4 days. Patient states it is very painful. Patient denies fever or chills. Patient is also concerned his glucose is high. Patient lives in a sober house and believes they are not giving him enough insulin. Patient reports his glucose has been around 485 recently. Patient reports he is supposed to be getting 10-15 units of insulin plus correction and the nurse at the sober house is only giving him 5 unit.  Patient with no other specific medical complaints    I have reviewed the Medications, Allergies, Past Medical and Surgical History, and Social History in the Fiiiling system.    Past Medical History:   Diagnosis Date     AIDS (H)      Allergic rhinitis due to other allergen     DNS     Chronic abdominal pain      CNS toxoplasmosis (H)      Diabetes type 2, controlled (H)      GERD (gastroesophageal reflux disease)      HIV (human immunodeficiency virus infection) (H)      Periungual wart      Sleep apnea     doesn't use CPAP       Past Surgical History:   Procedure Laterality Date     C NONSPECIFIC PROCEDURE      right forearm fracture     COLONOSCOPY Left 1/22/2016    Procedure: COMBINED COLONOSCOPY, SINGLE OR MULTIPLE BIOPSY/POLYPECTOMY BY BIOPSY;  Surgeon: Clark Saini MD;  Location: [facility] GI     HC EXPLORE UNDESC TESTIS,INGUIN/SCROTAL       LAPAROSCOPIC APPENDECTOMY N/A 1/31/2018    Procedure: LAPAROSCOPIC APPENDECTOMY;  LAPAROSCOPIC APPENDECTOMY;  Surgeon: Dawn Holt MD;  Location: [facility] OR     LAPAROSCOPY  DIAGNOSTIC (GENERAL) N/A 7/26/2016    Procedure: LAPAROSCOPY DIAGNOSTIC (GENERAL);  Surgeon: Susannah Arriaga MD;  Location: [facility] OR     LAPAROSCOPY DIAGNOSTIC (GENERAL) N/A 4/16/2018    Procedure: LAPAROSCOPY DIAGNOSTIC (GENERAL);  Diagnostic laparoscopy and lysis of adhesions;  Surgeon: Prince Dowling MD;  Location: [facility] OR     OPTICAL TRACKING SYSTEM CRANIOTOMY, EXCISE TUMOR, COMBINED Left 4/10/2015    Procedure: COMBINED OPTICAL TRACKING SYSTEM CRANIOTOMY, EXCISE TUMOR;  Surgeon: Mirlande Colmenares MD;  Location: [facility] OR     REPAIR GAMEKEEPER'S THUMB Right 12/2/2016    Procedure: REPAIR LIGAMENT ULNAR COLLATERAL THUMB (GAMEKEEPER'S);  Surgeon: Evin Zamorano MD;  Location: [facility]       Family History   Problem Relation Age of Onset     Diabetes Brother      Diabetes Father      Alzheimer Disease Father      Unknown/Adopted Mother      Diabetes Paternal Grandfather      Cancer No family hx of         no skin cancer     Skin Cancer No family hx of         no famiy hx of skin cancer     Glaucoma No family hx of      Macular Degeneration No family hx of        Social History     Tobacco Use     Smoking status: Current Some Day Smoker     Packs/day: 0.25     Types: Cigarettes     Last attempt to quit: 10/28/2016     Years since quitting: 3.3     Smokeless tobacco: Former User   Substance Use Topics     Alcohol use: No     Alcohol/week: 0.0 standard drinks     Comment: Last etoh in 2007       Current Facility-Administered Medications   Medication     lidocaine (PF) (XYLOCAINE) 1 % injection 4 mL     lidocaine (PF) (XYLOCAINE) 1 % injection 5 mL     lidocaine 1% with EPINEPHrine 1:100,000 1 %-1:[id number] injection     triamcinolone (KENALOG-40) injection 40 mg     triamcinolone (KENALOG-40) injection 40 mg     Current Outpatient Medications   Medication     doxycycline hyclate (VIBRAMYCIN) 100 MG capsule     bictegravir-emtricitabine-tenofovir (BIKTARVY) -25 MG per tablet     blood glucose (NO BRAND  "SPECIFIED) lancets standard     blood glucose (NO BRAND SPECIFIED) test strip     blood glucose monitoring (NO BRAND SPECIFIED) meter device kit     blood glucose monitoring (NO BRAND SPECIFIED) meter device kit     cetirizine (ZYRTEC) 10 MG tablet     Ciclopirox 8 % KIT     famotidine (PEPCID) 20 MG tablet     HUMALOG KWIKPEN 100 UNIT/ML soln     ibuprofen (ADVIL/MOTRIN) 200 MG tablet     insulin glargine (LANTUS PEN) 100 UNIT/ML pen     insulin pen needle (B-D U/F) 31G X 8 MM miscellaneous     insulin pen needle (BD PEN NEEDLE SCOTT 2ND GEN) 32G X 4 MM miscellaneous     mupirocin (BACTROBAN) 2 % external ointment     nicotine 21 MG/24HR TD 24 hr patch     Nutritional Supplements (GLUCOSE MANAGEMENT) TABS     ondansetron (ZOFRAN-ODT) 4 MG ODT tab     polyethylene glycol (MIRALAX) powder     Tavaborole 5 % EX topical solution     terbinafine 250 MG PO tablet        Allergies   Allergen Reactions     Metformin      Abdominal pain     Milk [Lac Bovis] Hives     Dulaglutide Rash       Review of Systems   Constitutional: Negative for chills and fever.   Respiratory: Negative for shortness of breath.    Cardiovascular: Negative for chest pain.   Gastrointestinal: Negative for nausea.   Endocrine:        +hyperglycemia   Musculoskeletal: Negative for back pain.   Skin: Positive for color change.        Ingrown hair right thigh   Allergic/Immunologic: Positive for immunocompromised state.   Neurological: Negative for weakness.   Hematological: Does not bruise/bleed easily.   Psychiatric/Behavioral: Negative for confusion.   All other systems reviewed and are negative.    Physical Exam   BP: 124/77  Heart Rate: 105  Temp: 98.6  F (37  C)  Resp: 18  Height: 162.6 cm (5' 4\")  Weight: 68 kg (150 lb)  SpO2: 98 %      Physical Exam  General: Afebrile, no acute distress.   HENT: MMM, no oropharyngeal lesions  Eyes: PERRL, normal sclerae  Neck: non-tender, supple  Cardio: regaular rate. Regular rhythm. Extremities well " perfused  Resp: Normal work of breathing, clear breath sounds  Chest/Back: no visual signs of trauma, no CVA tenderness  Abdomen: no tenderness, non-distended, no rebound, no guarding  Neuro: alert and fully oriented. CN II-XII grossly intact. Grossly normal strength and sensation in all extremities.   MSK: +right mid anterior thigh with ingrown hair with pinpoint pustule and 2-3 cm surrounding cellulitis   Integumentary/Skin: no rash visualized, normal color  Psych: normal affect, normal behavior    ED Course   12:26 AM  The patient was seen and examined by Sheila Bruno MD in [facility].        Procedures    .    Procedure: Incision and Drainage   LOCATIONS:  Right anterior mid thigh     ANESTHESIA:  Local field block using Lidocaine 1% with epinephrine, total of 2 mLs     PREPARATION:  Cleansed with Normal Saline and chloro prep     PROCEDURE:  Area was incised with # 15 Blade (Curved Point) with a Single Straight incision.  Wound treatment included Purulent Drainage.  Packing consisted of No Packing.  Appropriate dressing was applied to cover the area.    Patient Status: Patient tolerated the procedure well. There were no complications.    Labs Ordered and Resulted from Time of ED Arrival Up to the Time of Departure from the ED   GLUCOSE BY METER - Abnormal; Notable for the following components:       Result Value    Glucose 268 (*)     All other components within normal limits         Assessments & Plan (with Medical Decision Making)   Andrew Lockwood is a 52 year old male with a history of type 2 diabetes, HIV/AIDS, CNS toxoplasmosis, and recurrent SBOs who presents to the Emergency Department with an ingrown hair and concern of hyperglycemia.  On arrival patient is well-appearing, afebrile, no distress.  Initial blood glucose level 268.  As far as his elevated glucose level.  Patient states he has written orders from his doctors however the sober house is not giving his insulin as directed.  Patient states he will  call his doctor in the morning to clarify his dose.  At this time patient has no signs or symptoms of DKA, blood glucose level 268, recommend continued insulin as previously directed by his provider.  Patient with ingrown hair to his right anterior mid thigh with some surrounding cellulitis.  At this time wound was cleaned, small area of pustule was incised and drained with moderate amount of purulent drainage.  Patient tolerated procedure well.  Dressing was applied.  Due to patient's surrounding cellulitis will place patient on a course of doxycycline.  Recommend continue supportive care and close follow-up with his primary care provider in the next 2 to 3 days for further evaluation and follow-up.  Return precautions discussed if persistent hyperglycemia, high fever, severe pain, increased redness, swelling, drainage from his wound.  Patient understands agrees the plan. .The patient is discharged home with instructions to return if their symptoms persist or worsen.  Plan for close follow-up with their primary physician.  I discussed workup, results, treatment, and plan with the patient.  Patient understands and agrees with the plan.      I have reviewed the nursing notes.    I have reviewed the findings, diagnosis, plan and need for follow up with the patient.    Discharge Medication List as of 2/24/2020 12:56 AM      START taking these medications    Details   doxycycline hyclate (VIBRAMYCIN) 100 MG capsule Take 1 capsule (100 mg) by mouth 2 times daily for 7 days, Disp-14 capsule, R-0, Local Print             Final diagnoses:   Ingrown hair   Cellulitis of right lower extremity   Hyperglycemia     Cielo KELLY, am serving as a trained medical scribe to document services personally performed by Sheila Bruno MD, based on the provider's statements to me.      ISheila MD, was physically present and have reviewed and verified the accuracy of this note documented by Minh Boss.    2/23/2020    [facility], [place], EMERGENCY DEPARTMENT     Sheila Bruno MD  02/24/20 0123

## 2020-02-24 NOTE — ED TRIAGE NOTES
Pt arrives via triage, states he has a high blood sugar and a painful ingrown hair on his right thigh. States he's living at a sober house and they are in charge of his insulin and don't give him enough insulin.

## 2020-02-24 NOTE — TELEPHONE ENCOUNTER
[facility] Call Center    Phone Message    May a detailed message be left on voicemail: yes     Reason for Call: Medication Question or concern regarding medication   Prescription Clarification  Name of Medication: HUMALOG KWIKPEN 100 UNIT/ML soln  Prescribing Provider: Maricel   Pharmacy: n/a   What on the order needs clarification? Only thing written on order is to inject 10 - 15 units with meals plus correction - so they need to know what pt base is, and what sliding scale level you are using for corrections - is pt also doing carb based units - if so what is the gram ratio to that? Form was faxed to us as well. Please fax form back.          Action Taken: Message routed to:  [facility] endocrin    Travel Screening: Not Applicable

## 2020-02-24 NOTE — DISCHARGE INSTRUCTIONS
Thank you for your patience today.  Please follow-up with your regular doctor in the next 2-3 days for further evaluation and follow-up care.  Please call to schedule an appointment.  Please call your doctor in the morning to clarify your insulin dosing regimen so you are getting the correct dose.  Please keep wound clean, dry and take antibiotics as directed.  Please continue your own medications.  Please return to the ER if you develop high fever, increasing pain, swelling, redness, drainage, or any worsening of your current symptoms.  It was a pleasure taking care of you today.  We hope you feel better soon.

## 2020-02-25 ENCOUNTER — HOSPITAL ENCOUNTER (OUTPATIENT)
Facility: CLINIC | Age: 55
Setting detail: OBSERVATION
Discharge: HOME | End: 2020-02-26
Attending: EMERGENCY MEDICINE | Admitting: INTERNAL MEDICINE
Payer: COMMERCIAL

## 2020-02-25 DIAGNOSIS — R73.9 HYPERGLYCEMIA: ICD-10-CM

## 2020-02-25 DIAGNOSIS — E11.65 TYPE 2 DIABETES MELLITUS WITH HYPERGLYCEMIA, WITHOUT LONG-TERM CURRENT USE OF INSULIN (H): ICD-10-CM

## 2020-02-25 DIAGNOSIS — Z79.4 TYPE 2 DIABETES MELLITUS WITHOUT COMPLICATION, WITH LONG-TERM CURRENT USE OF INSULIN (H): Primary | ICD-10-CM

## 2020-02-25 DIAGNOSIS — L03.115 CELLULITIS OF RIGHT LOWER EXTREMITY: ICD-10-CM

## 2020-02-25 DIAGNOSIS — E11.9 TYPE 2 DIABETES MELLITUS WITHOUT COMPLICATION, WITH LONG-TERM CURRENT USE OF INSULIN (H): Primary | ICD-10-CM

## 2020-02-25 DIAGNOSIS — Z79.4 ENCOUNTER FOR LONG-TERM (CURRENT) USE OF INSULIN (H): ICD-10-CM

## 2020-02-25 PROBLEM — L03.90 CELLULITIS: Status: ACTIVE | Noted: 2020-02-25

## 2020-02-25 LAB
ALBUMIN SERPL-MCNC: 2.9 G/DL (ref 3.4–5)
ALBUMIN SERPL-MCNC: 3 G/DL (ref 3.4–5)
ALBUMIN UR-MCNC: NEGATIVE MG/DL
ALP SERPL-CCNC: 135 U/L (ref 40–150)
ALP SERPL-CCNC: 180 U/L (ref 40–150)
ALT SERPL W P-5'-P-CCNC: 24 U/L (ref 0–70)
ALT SERPL W P-5'-P-CCNC: 27 U/L (ref 0–70)
ANION GAP SERPL CALCULATED.3IONS-SCNC: 4 MMOL/L (ref 3–14)
ANION GAP SERPL CALCULATED.3IONS-SCNC: 5 MMOL/L (ref 3–14)
APPEARANCE UR: CLEAR
AST SERPL W P-5'-P-CCNC: 14 U/L (ref 0–45)
AST SERPL W P-5'-P-CCNC: 18 U/L (ref 0–45)
BASOPHILS # BLD AUTO: 0 10E9/L (ref 0–0.2)
BASOPHILS # BLD AUTO: 0.1 10E9/L (ref 0–0.2)
BASOPHILS NFR BLD AUTO: 0.4 %
BASOPHILS NFR BLD AUTO: 0.7 %
BILIRUB SERPL-MCNC: 0.5 MG/DL (ref 0.2–1.3)
BILIRUB SERPL-MCNC: 0.7 MG/DL (ref 0.2–1.3)
BILIRUB UR QL STRIP: NEGATIVE
BUN SERPL-MCNC: 13 MG/DL (ref 7–30)
BUN SERPL-MCNC: 18 MG/DL (ref 7–30)
CALCIUM SERPL-MCNC: 8 MG/DL (ref 8.5–10.1)
CALCIUM SERPL-MCNC: 8.2 MG/DL (ref 8.5–10.1)
CHLORIDE SERPL-SCNC: 104 MMOL/L (ref 94–109)
CHLORIDE SERPL-SCNC: 108 MMOL/L (ref 94–109)
CO2 SERPL-SCNC: 23 MMOL/L (ref 20–32)
CO2 SERPL-SCNC: 27 MMOL/L (ref 20–32)
COLOR UR AUTO: ABNORMAL
CREAT SERPL-MCNC: 0.62 MG/DL (ref 0.66–1.25)
CREAT SERPL-MCNC: 0.75 MG/DL (ref 0.66–1.25)
CRP SERPL-MCNC: 17 MG/L (ref 0–8)
DIFFERENTIAL METHOD BLD: ABNORMAL
DIFFERENTIAL METHOD BLD: ABNORMAL
EOSINOPHIL # BLD AUTO: 0.2 10E9/L (ref 0–0.7)
EOSINOPHIL # BLD AUTO: 0.2 10E9/L (ref 0–0.7)
EOSINOPHIL NFR BLD AUTO: 2.9 %
EOSINOPHIL NFR BLD AUTO: 3.3 %
ERYTHROCYTE [DISTWIDTH] IN BLOOD BY AUTOMATED COUNT: 13.6 % (ref 10–15)
ERYTHROCYTE [DISTWIDTH] IN BLOOD BY AUTOMATED COUNT: 13.9 % (ref 10–15)
ERYTHROCYTE [SEDIMENTATION RATE] IN BLOOD BY WESTERGREN METHOD: 21 MM/H (ref 0–20)
GFR SERPL CREATININE-BSD FRML MDRD: >90 ML/MIN/{1.73_M2}
GFR SERPL CREATININE-BSD FRML MDRD: >90 ML/MIN/{1.73_M2}
GLUCOSE BLDC GLUCOMTR-MCNC: 113 MG/DL (ref 70–99)
GLUCOSE BLDC GLUCOMTR-MCNC: 126 MG/DL (ref 70–99)
GLUCOSE BLDC GLUCOMTR-MCNC: 165 MG/DL (ref 70–99)
GLUCOSE BLDC GLUCOMTR-MCNC: 197 MG/DL (ref 70–99)
GLUCOSE BLDC GLUCOMTR-MCNC: 212 MG/DL (ref 70–99)
GLUCOSE BLDC GLUCOMTR-MCNC: 261 MG/DL (ref 70–99)
GLUCOSE BLDC GLUCOMTR-MCNC: 269 MG/DL (ref 70–99)
GLUCOSE BLDC GLUCOMTR-MCNC: 346 MG/DL (ref 70–99)
GLUCOSE BLDC GLUCOMTR-MCNC: 456 MG/DL (ref 70–99)
GLUCOSE SERPL-MCNC: 249 MG/DL (ref 70–99)
GLUCOSE SERPL-MCNC: 470 MG/DL (ref 70–99)
GLUCOSE UR STRIP-MCNC: >1000 MG/DL
HCT VFR BLD AUTO: 37.5 % (ref 40–53)
HCT VFR BLD AUTO: 38.3 % (ref 40–53)
HGB BLD-MCNC: 12.4 G/DL (ref 13.3–17.7)
HGB BLD-MCNC: 12.6 G/DL (ref 13.3–17.7)
HGB UR QL STRIP: NEGATIVE
IMM GRANULOCYTES # BLD: 0.1 10E9/L (ref 0–0.4)
IMM GRANULOCYTES # BLD: 0.1 10E9/L (ref 0–0.4)
IMM GRANULOCYTES NFR BLD: 1.3 %
IMM GRANULOCYTES NFR BLD: 1.4 %
KETONES UR STRIP-MCNC: NEGATIVE MG/DL
LEUKOCYTE ESTERASE UR QL STRIP: NEGATIVE
LYMPHOCYTES # BLD AUTO: 2.2 10E9/L (ref 0.8–5.3)
LYMPHOCYTES # BLD AUTO: 2.3 10E9/L (ref 0.8–5.3)
LYMPHOCYTES NFR BLD AUTO: 29.8 %
LYMPHOCYTES NFR BLD AUTO: 31 %
MCH RBC QN AUTO: 31.2 PG (ref 26.5–33)
MCH RBC QN AUTO: 31.5 PG (ref 26.5–33)
MCHC RBC AUTO-ENTMCNC: 32.9 G/DL (ref 31.5–36.5)
MCHC RBC AUTO-ENTMCNC: 33.1 G/DL (ref 31.5–36.5)
MCV RBC AUTO: 94 FL (ref 78–100)
MCV RBC AUTO: 96 FL (ref 78–100)
MONOCYTES # BLD AUTO: 0.5 10E9/L (ref 0–1.3)
MONOCYTES # BLD AUTO: 0.5 10E9/L (ref 0–1.3)
MONOCYTES NFR BLD AUTO: 6.5 %
MONOCYTES NFR BLD AUTO: 7.1 %
MUCOUS THREADS #/AREA URNS LPF: PRESENT /LPF
NEUTROPHILS # BLD AUTO: 4.1 10E9/L (ref 1.6–8.3)
NEUTROPHILS # BLD AUTO: 4.4 10E9/L (ref 1.6–8.3)
NEUTROPHILS NFR BLD AUTO: 57.4 %
NEUTROPHILS NFR BLD AUTO: 58.2 %
NITRATE UR QL: NEGATIVE
NRBC # BLD AUTO: 0 10*3/UL
NRBC # BLD AUTO: 0 10*3/UL
NRBC BLD AUTO-RTO: 0 /100
NRBC BLD AUTO-RTO: 0 /100
PH UR STRIP: 6 PH (ref 5–7)
PLATELET # BLD AUTO: 238 10E9/L (ref 150–450)
PLATELET # BLD AUTO: 259 10E9/L (ref 150–450)
POTASSIUM SERPL-SCNC: 3.8 MMOL/L (ref 3.4–5.3)
POTASSIUM SERPL-SCNC: 3.9 MMOL/L (ref 3.4–5.3)
PROT SERPL-MCNC: 5.9 G/DL (ref 6.8–8.8)
PROT SERPL-MCNC: 6.2 G/DL (ref 6.8–8.8)
RBC # BLD AUTO: 3.98 10E12/L (ref 4.4–5.9)
RBC # BLD AUTO: 4 10E12/L (ref 4.4–5.9)
RBC #/AREA URNS AUTO: <1 /HPF (ref 0–2)
SODIUM SERPL-SCNC: 135 MMOL/L (ref 133–144)
SODIUM SERPL-SCNC: 137 MMOL/L (ref 133–144)
SOURCE: ABNORMAL
SP GR UR STRIP: 1.02 (ref 1–1.03)
UROBILINOGEN UR STRIP-MCNC: NORMAL MG/DL (ref 0–2)
WBC # BLD AUTO: 7.1 10E9/L (ref 4–11)
WBC # BLD AUTO: 7.6 10E9/L (ref 4–11)
WBC #/AREA URNS AUTO: <1 /HPF (ref 0–5)

## 2020-02-25 PROCEDURE — 80053 COMPREHEN METABOLIC PANEL: CPT | Performed by: EMERGENCY MEDICINE

## 2020-02-25 PROCEDURE — 96368 THER/DIAG CONCURRENT INF: CPT

## 2020-02-25 PROCEDURE — 40000141 ZZH STATISTIC PERIPHERAL IV START W/O US GUIDANCE

## 2020-02-25 PROCEDURE — 99285 EMERGENCY DEPT VISIT HI MDM: CPT | Mod: 25

## 2020-02-25 PROCEDURE — 87077 CULTURE AEROBIC IDENTIFY: CPT | Performed by: EMERGENCY MEDICINE

## 2020-02-25 PROCEDURE — 96376 TX/PRO/DX INJ SAME DRUG ADON: CPT

## 2020-02-25 PROCEDURE — 87186 SC STD MICRODIL/AGAR DIL: CPT | Performed by: EMERGENCY MEDICINE

## 2020-02-25 PROCEDURE — 25000128 H RX IP 250 OP 636: Performed by: EMERGENCY MEDICINE

## 2020-02-25 PROCEDURE — 96372 THER/PROPH/DIAG INJ SC/IM: CPT | Mod: 59

## 2020-02-25 PROCEDURE — 85025 COMPLETE CBC W/AUTO DIFF WBC: CPT | Performed by: EMERGENCY MEDICINE

## 2020-02-25 PROCEDURE — G0378 HOSPITAL OBSERVATION PER HR: HCPCS

## 2020-02-25 PROCEDURE — 96375 TX/PRO/DX INJ NEW DRUG ADDON: CPT

## 2020-02-25 PROCEDURE — 25000132 ZZH RX MED GY IP 250 OP 250 PS 637: Performed by: PHYSICIAN ASSISTANT

## 2020-02-25 PROCEDURE — 86140 C-REACTIVE PROTEIN: CPT | Performed by: PHYSICIAN ASSISTANT

## 2020-02-25 PROCEDURE — 81001 URINALYSIS AUTO W/SCOPE: CPT | Performed by: PHYSICIAN ASSISTANT

## 2020-02-25 PROCEDURE — 25000131 ZZH RX MED GY IP 250 OP 636 PS 637: Performed by: PHYSICIAN ASSISTANT

## 2020-02-25 PROCEDURE — 87070 CULTURE OTHR SPECIMN AEROBIC: CPT | Performed by: EMERGENCY MEDICINE

## 2020-02-25 PROCEDURE — 85025 COMPLETE CBC W/AUTO DIFF WBC: CPT | Mod: 91 | Performed by: PHYSICIAN ASSISTANT

## 2020-02-25 PROCEDURE — 40000802 ZZH SITE CHECK

## 2020-02-25 PROCEDURE — 99220 ZZC INITIAL OBSERVATION CARE,LEVL III: CPT | Mod: Z6 | Performed by: PHYSICIAN ASSISTANT

## 2020-02-25 PROCEDURE — 25800030 ZZH RX IP 258 OP 636: Performed by: EMERGENCY MEDICINE

## 2020-02-25 PROCEDURE — 80053 COMPREHEN METABOLIC PANEL: CPT | Performed by: PHYSICIAN ASSISTANT

## 2020-02-25 PROCEDURE — 36415 COLL VENOUS BLD VENIPUNCTURE: CPT | Performed by: PHYSICIAN ASSISTANT

## 2020-02-25 PROCEDURE — 96366 THER/PROPH/DIAG IV INF ADDON: CPT

## 2020-02-25 PROCEDURE — 96361 HYDRATE IV INFUSION ADD-ON: CPT

## 2020-02-25 PROCEDURE — 85652 RBC SED RATE AUTOMATED: CPT | Performed by: PHYSICIAN ASSISTANT

## 2020-02-25 PROCEDURE — 96365 THER/PROPH/DIAG IV INF INIT: CPT

## 2020-02-25 PROCEDURE — 00000146 ZZHCL STATISTIC GLUCOSE BY METER IP

## 2020-02-25 RX ORDER — IBUPROFEN 600 MG/1
600 TABLET, FILM COATED ORAL EVERY 6 HOURS PRN
Status: DISCONTINUED | OUTPATIENT
Start: 2020-02-25 | End: 2020-02-26 | Stop reason: HOSPADM

## 2020-02-25 RX ORDER — INSULIN LISPRO 100 [IU]/ML
INJECTION, SOLUTION INTRAVENOUS; SUBCUTANEOUS
Qty: 15 ML | Refills: 3 | COMMUNITY
Start: 2020-02-25 | End: 2024-08-08

## 2020-02-25 RX ORDER — LIDOCAINE HYDROCHLORIDE AND EPINEPHRINE 10; 10 MG/ML; UG/ML
INJECTION, SOLUTION INFILTRATION; PERINEURAL
Status: DISCONTINUED
Start: 2020-02-25 | End: 2020-02-25 | Stop reason: WASHOUT

## 2020-02-25 RX ORDER — POLYETHYLENE GLYCOL 3350 17 G/17G
17 POWDER, FOR SOLUTION ORAL DAILY PRN
Status: DISCONTINUED | OUTPATIENT
Start: 2020-02-25 | End: 2020-02-26 | Stop reason: HOSPADM

## 2020-02-25 RX ORDER — SODIUM CHLORIDE 9 MG/ML
1000 INJECTION, SOLUTION INTRAVENOUS CONTINUOUS
Status: DISCONTINUED | OUTPATIENT
Start: 2020-02-25 | End: 2020-02-26 | Stop reason: HOSPADM

## 2020-02-25 RX ORDER — ONDANSETRON 2 MG/ML
4 INJECTION INTRAMUSCULAR; INTRAVENOUS EVERY 6 HOURS PRN
Status: DISCONTINUED | OUTPATIENT
Start: 2020-02-25 | End: 2020-02-26 | Stop reason: HOSPADM

## 2020-02-25 RX ORDER — DEXTROSE MONOHYDRATE 25 G/50ML
25-50 INJECTION, SOLUTION INTRAVENOUS
Status: DISCONTINUED | OUTPATIENT
Start: 2020-02-25 | End: 2020-02-26 | Stop reason: HOSPADM

## 2020-02-25 RX ORDER — NALOXONE HYDROCHLORIDE 0.4 MG/ML
.1-.4 INJECTION, SOLUTION INTRAMUSCULAR; INTRAVENOUS; SUBCUTANEOUS
Status: DISCONTINUED | OUTPATIENT
Start: 2020-02-25 | End: 2020-02-26 | Stop reason: HOSPADM

## 2020-02-25 RX ORDER — LIDOCAINE HYDROCHLORIDE 10 MG/ML
INJECTION, SOLUTION EPIDURAL; INFILTRATION; INTRACAUDAL; PERINEURAL
Status: DISCONTINUED
Start: 2020-02-25 | End: 2020-02-25 | Stop reason: WASHOUT

## 2020-02-25 RX ORDER — CETIRIZINE HYDROCHLORIDE 10 MG/1
10 TABLET ORAL DAILY
Status: DISCONTINUED | OUTPATIENT
Start: 2020-02-25 | End: 2020-02-26 | Stop reason: HOSPADM

## 2020-02-25 RX ORDER — DOXYCYCLINE 100 MG/1
100 CAPSULE ORAL 2 TIMES DAILY
Qty: 20 CAPSULE | Refills: 0 | OUTPATIENT
Start: 2020-02-25 | End: 2024-08-08

## 2020-02-25 RX ORDER — LIDOCAINE HYDROCHLORIDE AND EPINEPHRINE 10; 10 MG/ML; UG/ML
INJECTION, SOLUTION INFILTRATION; PERINEURAL
Status: DISCONTINUED
Start: 2020-02-25 | End: 2020-02-25 | Stop reason: HOSPADM

## 2020-02-25 RX ORDER — PIPERACILLIN SODIUM, TAZOBACTAM SODIUM 3; .375 G/15ML; G/15ML
3.38 INJECTION, POWDER, LYOPHILIZED, FOR SOLUTION INTRAVENOUS EVERY 6 HOURS
Status: DISCONTINUED | OUTPATIENT
Start: 2020-02-25 | End: 2020-02-26 | Stop reason: HOSPADM

## 2020-02-25 RX ORDER — ACETAMINOPHEN 650 MG/1
650 SUPPOSITORY RECTAL EVERY 4 HOURS PRN
Status: DISCONTINUED | OUTPATIENT
Start: 2020-02-25 | End: 2020-02-26 | Stop reason: HOSPADM

## 2020-02-25 RX ORDER — ONDANSETRON 4 MG/1
4 TABLET, ORALLY DISINTEGRATING ORAL EVERY 6 HOURS PRN
Status: DISCONTINUED | OUTPATIENT
Start: 2020-02-25 | End: 2020-02-26 | Stop reason: HOSPADM

## 2020-02-25 RX ORDER — NICOTINE POLACRILEX 4 MG
15-30 LOZENGE BUCCAL
Status: DISCONTINUED | OUTPATIENT
Start: 2020-02-25 | End: 2020-02-26 | Stop reason: HOSPADM

## 2020-02-25 RX ORDER — TERBINAFINE HYDROCHLORIDE 250 MG/1
250 TABLET ORAL DAILY
Status: DISCONTINUED | OUTPATIENT
Start: 2020-02-25 | End: 2020-02-26 | Stop reason: HOSPADM

## 2020-02-25 RX ORDER — ACETAMINOPHEN 500 MG
1000 TABLET ORAL EVERY 6 HOURS PRN
Status: DISCONTINUED | OUTPATIENT
Start: 2020-02-25 | End: 2020-02-26 | Stop reason: HOSPADM

## 2020-02-25 RX ADMIN — VANCOMYCIN HYDROCHLORIDE 1500 MG: 10 INJECTION, POWDER, LYOPHILIZED, FOR SOLUTION INTRAVENOUS at 04:17

## 2020-02-25 RX ADMIN — VANCOMYCIN HYDROCHLORIDE 1500 MG: 10 INJECTION, POWDER, LYOPHILIZED, FOR SOLUTION INTRAVENOUS at 16:28

## 2020-02-25 RX ADMIN — SODIUM CHLORIDE 1000 ML: 9 INJECTION, SOLUTION INTRAVENOUS at 01:28

## 2020-02-25 RX ADMIN — PIPERACILLIN AND TAZOBACTAM 3.38 G: 3; .375 INJECTION, POWDER, FOR SOLUTION INTRAVENOUS at 09:06

## 2020-02-25 RX ADMIN — IBUPROFEN 600 MG: 600 TABLET ORAL at 14:03

## 2020-02-25 RX ADMIN — INSULIN GLARGINE 30 UNITS: 100 INJECTION, SOLUTION SUBCUTANEOUS at 21:20

## 2020-02-25 RX ADMIN — PIPERACILLIN AND TAZOBACTAM 3.38 G: 3; .375 INJECTION, POWDER, FOR SOLUTION INTRAVENOUS at 21:19

## 2020-02-25 RX ADMIN — TERBINAFINE HYDROCHLORIDE 250 MG: 250 TABLET ORAL at 09:02

## 2020-02-25 RX ADMIN — ACETAMINOPHEN 1000 MG: 500 TABLET, FILM COATED ORAL at 21:19

## 2020-02-25 RX ADMIN — INSULIN ASPART 1 UNITS: 100 INJECTION, SOLUTION INTRAVENOUS; SUBCUTANEOUS at 11:48

## 2020-02-25 RX ADMIN — INSULIN ASPART 2 UNITS: 100 INJECTION, SOLUTION INTRAVENOUS; SUBCUTANEOUS at 08:59

## 2020-02-25 RX ADMIN — SODIUM CHLORIDE 1000 ML: 9 INJECTION, SOLUTION INTRAVENOUS at 02:52

## 2020-02-25 RX ADMIN — BICTEGRAVIR SODIUM, EMTRICITABINE, AND TENOFOVIR ALAFENAMIDE FUMARATE 1 TABLET: 50; 200; 25 TABLET ORAL at 09:02

## 2020-02-25 RX ADMIN — PIPERACILLIN AND TAZOBACTAM 3.38 G: 3; .375 INJECTION, POWDER, FOR SOLUTION INTRAVENOUS at 15:13

## 2020-02-25 RX ADMIN — IBUPROFEN 600 MG: 600 TABLET ORAL at 21:19

## 2020-02-25 RX ADMIN — INSULIN ASPART 6 UNITS: 100 INJECTION, SOLUTION INTRAVENOUS; SUBCUTANEOUS at 03:40

## 2020-02-25 RX ADMIN — INSULIN GLARGINE 30 UNITS: 100 INJECTION, SOLUTION SUBCUTANEOUS at 09:00

## 2020-02-25 RX ADMIN — PIPERACILLIN AND TAZOBACTAM 3.38 G: 3; .375 INJECTION, POWDER, FOR SOLUTION INTRAVENOUS at 03:16

## 2020-02-25 ASSESSMENT — MIFFLIN-ST. JEOR: SCORE: 1454.08

## 2020-02-25 ASSESSMENT — ENCOUNTER SYMPTOMS
ABDOMINAL PAIN: 0
SHORTNESS OF BREATH: 0
FEVER: 0
CONFUSION: 0
ENDOCRINE COMMENTS: HYPERGLYCEMIA
WEAKNESS: 0
BRUISES/BLEEDS EASILY: 0

## 2020-02-25 NOTE — CONSULTS
Brief Diabetes Curbside Consult:  Discussed recommendations below with primary team.    Andrew is a 52 year old male with uncontrolled TIIDM (most recent A1c 11.6%) who is being admitted to observation with left thigh cellulitis and hyperglycemia.     He noted to primary providers that there was a recent issue with his sober living providers misplacing his Lantus insulin, and he missed at least 2-3 days of insulin; he also reported that they are only administering 5 units of fixed meal insulin (PTA list states Humalog 10-15 units with meals, plus correction). It is unclear why facility not adhering to orders, no reports of hypoglycemia as a reason medications are being reduced/withheld.     He did recently see [facility] this month in clinic; reviewed importance of adherence to his insulin regimen (thus, the impression that he is responsible for self administration of insulin, not staff at sober living house?). It seemed as though BG had been improving recently, per Jacquelyn Connelly's note.     BG on presentation [id number]. Received 6 units of aspart with subsequent BG improving [id number]>197. No laboratory evidence for DKA on admission. Renal function is WNL.    Primary team has resumed his PTA basal insulin regimen (glargine 30 units BID), and 1:3g CHO with moderate intensity sliding scale AC/HS. He may discharge later today. He is tolerating a diet.    Recommendations:  -continue Lantus 30 units BID  -balance basal/bolus requirements (thus, would reduce to 1:4g CHO and increase to high intensity sliding scale AC/HS)  -for discharge, recommend clear instruction to give 15 units fixed meal insulin with each meal (this is based upon 1:4g CHO for average 60g CHO intake per meal, can adjust based upon his diet in the outpatient setting)  -would continue high intensity sliding scale AC/HS upon discharge; scales are posted below     High Intensity  Sliding Scale (1:25)  Mealtime Scale, to be given based on blood  sugar obtained prior to a meal  For Pre-Meal BG less than 140, no additional insulin needed  For Pre-Meal  - 164 give 1 unit.   For Pre-Meal  - 189 give 2 units.   For Pre-Meal  - 214 give 3 units.   For Pre-Meal  - 239 give 4 units.   For Pre-Meal  - 264 give 5 units.   For Pre-Meal  - 289 give 6 units.   For Pre-Meal  - 314 give 7 units.   For Pre-Meal  - 339 give 8 units.   For Pre-Meal BG greater than 340 give 9 units.     Bedtime Scale, to be given based on pre-bedtime blood sugar  For Bedtime BG less than 200, no additional insulin needed  For  - 224 give 1 unit.   For  - 249 give 2 units.   For  - 274 give 3 units.   For  - 299 give 4 units.   For  - 324 give 5 units.   For  - 349 give 6 units.   For BG greater than 350 give 7 units.     He should continue to check BG before meals and at bedtime, and should follow up with [facility] 3-4 weeks following discharge for review of BG and medications, with further titration as needed, with consideration of alternative non-insulin agents to treat diabetes, if medically appropriate.     Signing of this note resolves consult request; please do not hesitate to call back with questions if patient to remain hospitalized and if BG do not improve on the above regimen.     Haley Shrestha PA-C  Diabetes Management Service  Pager [phone]   Job code [id number]

## 2020-02-25 NOTE — PROGRESS NOTES
"[facility]  Daily Progress Note          Assessment & Plan:   Andrew Lockwood is a 52 year old male admitted on 2/25/2020. He has a history of type 2 diabetes, HIV/AIDS, CNS toxoplasmosis, and recurrent SBOs who presented to the ED with cellulitis and hyperglycemia.       ##Left Thigh Cellulitis: initially presented to the ED 2/23 with an ingrown hair to his right anterior thigh that had been present for the past 5 days; This was I&D'ed with moderate amount of purulent drainage. Discharged with doxycyline but was not filled. Now presenting with hyperglycemia and worsening cellulitis. HD stable, afebrile. No leukocytosis. He was started on vancomycin and zosyn.  On exam of left anterior thigh ~5 cm x 5 cm area of erythema and induration with mild erythema outside circled area.   - Continue with Vancomycin and Zosyn. Transition to Doxycycline on discharge.    - Trend CBC, BMP, and CRP  -  IVF NS at 125 ml/hr  - Tylenol and Ibuprofen prn pain/fever  - Contact precautions given history of MRSA      ##DM2, poorly controlled, w/ hyperglycemia: Last A1c 11.6 on 2/11/20. On Lantus 30U BID and Novolog 10-15unit plus carb coverage. Today glucose at [facility] was 511 and sent to the ED. Reports polyuria and polydipsia. [facility] \"[facility]\" [facility] and also have not been giving him the right amount of his short acting insulin. States prior to going in to [facility] he thinks his carb coverage was 1:3. He was seen by Ashley Connelly on 2/11/20. Glucose improving since admission   - Continue 30U glargine BID  - Novolog sliding scale insulin medium resistance TID ac meals plus I:C ratio of 1:3 (discuss with Endo as this is unclear)  - Endocrinology to clarify regimen vs simplify regimen for patient  - Diabetes education   - BG checks TID ac, at bedtime, q2am  - Hypoglycemia protocol     ##Disposition: patient is currently at a sober house and has " "had problems with the facility.  -  consult   - Assist in patient disposition     Chronic Medical Problems:  ##Onychomycosis:   - Continue with PTA Ciclopirox and Terbinafine       ##HIV/AIDs: Last CD4 count was 490. HIV RNA non detectable at that time. Last ID visit seems to have been on 2/11/20.   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     ##Tobacco use: Does not smoke regularly. Declines Nicotine patch.    FEN: ADAT  Lines: PIV  Prophylaxis: Early ambulation          Consults:   Endocrinology   Diabetes Education  SW          Discharge Planning:   Pending improved BG and cellulitis, anticipate discharge later today versus tomorrow         Interval History:   Seen up ad lexi. Denies fevers, chills, or other acute complaints.    ROS:   Constitutional: No fevers/chills. Tolerating diet.   Cardiovascular: No chest pain or palpitations.   Respiratory: No cough or SOB.   GI: No abdominal pain. No N/V.      : No urinary complaints.   Musculoskeletal: Denies pain.           Physical Exam:   /81   Pulse 90   Temp 99  F (37.2  C) (Oral)   Resp 16   Ht 1.626 m (5' 4\")   Wt 70.3 kg (155 lb)   SpO2 100%   BMI 26.61 kg/m       GENERAL: Alert and oriented x 3. NAD.   HEENT: Anicteric sclera. Mucous membranes moist.   CV: RRR. S1, S2. No murmurs appreciated.   RESPIRATORY: Effort normal. Lungs CTAB with no wheezing, rales, rhonchi.   GI: Abdomen soft and non distended with normoactive bowel sounds present in all quadrants. No tenderness, rebound, guarding.   NEUROLOGICAL: No focal deficits. Moves all extremities.    EXTREMITIES: No peripheral edema. Intact bilateral pedal pulses.   SKIN: left anterior thigh ~5 cm x 5 cm area of erythema and induration with mild erythema outside circled area.    Medication list reviewed.   Today's labs and imaging were reviewed.     VIKASH Alvarez, CNP  Emergency " Department Observation Unit

## 2020-02-25 NOTE — CONSULTS
"Social Work: Assessment with Discharge Plan    Patient Name:  Andrew Lockwood  :  1967  Age:  52 year old  MRN:  [id number]  Risk/Complexity Score:     Completed assessment with:  Patient    Presenting Information   Reason for Referral:  Discharge plan  Date of Intake:  2020  Referral Source:  Physician  Decision Maker:  Patient at baseline  Alternate Decision Maker:  Undecided  Health Care Directive:  Provided education  Living Situation:  Pt living at an independant sober living facility  Previous Functional Status:  Independent  Patient and family understanding of hospitalization:  \"I has a wound on my leg with an infection\".   Cultural/Language/Spiritual Considerations:  Patient identifies as Gnosticism  Adjustment to Illness:  The patient appears to be asking good questions on how to stabilize his life to assist with medical needs.     Physical Health  Reason for Admission:    1. Cellulitis of right lower extremity    2. Hyperglycemia      Services Needed/Recommended:  Home with no services    Mental Health/Chemical Dependency  Diagnosis:  The patient reports a history of chemical dependency.  Support/Services in Place:  He is currently living in sober housing at [facility] and attending a stepdown program at [facility]. He also has a sponsor that he is just starting to meet with.     Services Needed/Recommended:  None    Support System  Significant relationship at present time:  Patient cannot identify any significant relationships at this time. Because of his sobriety, he has cut ties with several good friends as they are still using.   Family of origin is available for support:  No  Other support available:  The patient works with a [facility]  through his primary care clinic. He provided me permission to reach out to him to follow up after his ED visit.   Gaps in support system:  The patient is in a point of transmission with his supports. Ongoing check-ins to see if " his needs are being met, would be beneficial.  Patient is caregiver to:  None     Provider Information   Primary Care Physician:  Caitie Lamb   [phone]   Clinic:  [address] [place] [address] 741 / [facility] [id number]      :  Simon Cadena [phone]    Financial   Income Source:  The patient is on general assistance. He is currently working with the [facility] to try to get on social security disability. He will hear back in April if his was approved.   Financial Concerns:  Patient is low income but able to meet basic needs.   Insurance:    Payor/Plan Subscriber Name Rel Member # Group #   BCBS - BCBS INDIVIDUAL BREE ROSAS [facility] [id number]      [address] [address]   [place]* BREE ROSAS [facility] [id number]       [address] [address]       Discharge Plan   Patient and family discharge goal:  The patient will discharge home to his sober house when ready.   Barriers to discharge:  Medical clearance    Discharge Recommendations   Anticipated Disposition:  Home, no needs identified-The patient must be at home by 12am or will not be able to get into the house. If discharge is after hours or late into the night, please contact housing staff prior to discharge to ensure he is able to get in.     Transportation Needs:  Cab:  Pt can take medical taxi throug his insurance if interested [facility] [phone]    Name of Transportation Company and Phone: Patient will need to arrange taxi through [facility] [phone]    Additional comments   Bree Rosas is a 52 year old male who presents to the ED today with cellulitis of the leg. Bree shared that his sobriety is going well, continues to go to treatment at [facility] and he lives in sober housing at [facility]. He recently got a sponsor and is starting to work with him. He notes he is taking his sobriety seriously in hopes to get more permenent housing after his program.     Bree shared that the staff at his sober living is confused about  his insulin needs. I contacted the facility who reports there is discrepancies on what the nursing staff have for dosing vs what Andrew is stating he needs. The house contracts with a nursing company that comes in and manages the medication. Andrew is required to administer his own insulin. Staff need ordered for insulin clarified and want direction on sliding scale BGL.     When the patient is ready for discharge, please contact the nursing team at [phone] and/or sober living [place] staff [phone] to update them on medication clarifications and/or medication needs.     RADHA Umaña. [facility].   Clinical , Emergency Department   [facility]  Pager: [phone] Mon-Sat 9 am - 9 pm  On-call/after hours pager [phone]    [facility]  Rob [address] [address]  [phone]

## 2020-02-25 NOTE — ED TRIAGE NOTES
BIBA from [facility] c/o hyperglycemia, nausea.  Long acting insulin was given prior to EMS (they didn't tell EMS how much insulin)     100cc NS given, 4mg zofran

## 2020-02-25 NOTE — PROGRESS NOTES
Care Coordinator - Discharge Planning    Admission Date/Time:  2/25/2020  Attending MD:  Lucila att. providers found     Data  Chart reviewed, discussed with interdisciplinary team.   Patient was admitted for:   1. Cellulitis of right lower extremity    2. Hyperglycemia    3. Type 2 diabetes mellitus with hyperglycemia, without long-term current use of insulin (H)      Please refer to  assessment for additional information.  Spoke with  regarding concerns with medication management at [facility] where pt resides.   Briefly discussed pt status with JASON Harden.   Endocrine team consulted and insulin regiment will be adjusted.   Spoke with SENG Robb [facility] regarding insulin adniminstration.  Per Clarisse the pt dials up his insulin pen and would show the pen to the [facility] staff member to confirm dose and pt self administers.  Agreed to have final discharge orders faxed.  [facility] Fax # [phone]  Discharge orders updated.       Plan  Anticipated Discharge Date:  TBD  Anticipated Discharge Plan:  TBD    Jolynn Loja RN BSN, PHN RN Care Coordinator  Internal Medicine   [phone]  Pager: [phone]  Weekend RN Care Coordinator job code * * * [phone]  [facility] [place]  2/25/2020 4:17 PM

## 2020-02-25 NOTE — ED NOTES
[facility]   ED Nurse to Floor Handoff     Andrew Lockwood is a 52 year old male who speaks English and lives with others,  in a group home  They arrived in the ED by ambulance from Sober house    ED Chief Complaint: Hyperglycemia    ED Dx;   Final diagnoses:   Cellulitis of right lower extremity   Hyperglycemia         Needed?: No    Allergies:   Allergies   Allergen Reactions     Metformin      Abdominal pain     Milk [Lac Bovis] Hives     Dulaglutide Rash   .  Past Medical Hx:   Past Medical History:   Diagnosis Date     AIDS (H)      Allergic rhinitis due to other allergen     DNS     Chronic abdominal pain      CNS toxoplasmosis (H)      Diabetes type 2, controlled (H)      GERD (gastroesophageal reflux disease)      HIV (human immunodeficiency virus infection) (H)      Periungual wart      Sleep apnea     doesn't use CPAP      Baseline Mental status: WDL  Current Mental Status changes: at basesline    Infection present or suspected this encounter: yes  Sepsis suspected: No  Isolation type: No active isolations     Activity level - Baseline/Home:  Independent  Activity Level - Current:   Independent    Bariatric equipment needed?: No    In the ED these meds were given:   Medications   0.9% sodium chloride BOLUS (0 mLs Intravenous Stopped 2/25/20 0252)     Followed by   sodium chloride 0.9% infusion (1,000 mLs Intravenous New Bag 2/25/20 0252)   vancomycin 1500 mg in 0.9% NaCl 250 ml intermittent infusion 1,500 mg (has no administration in time range)   piperacillin-tazobactam (ZOSYN) 3.375 g vial to attach to  mL bag (has no administration in time range)       Drips running?  No    Home pump  No    Current LDAs  Peripheral IV 02/25/20 Left (Active)   Site Assessment WDL 2/25/2020 12:21 AM   Line Status Infusing 2/25/2020 12:21 AM   Phlebitis Scale 0-->no symptoms 2/25/2020 12:21 AM   Number of days: 0       Wound 01/15/20 Right Buttocks open skin (Active)  "  Number of days: 41       Labs results:   Labs Ordered and Resulted from Time of ED Arrival Up to the Time of Departure from the ED   GLUCOSE BY METER - Abnormal; Notable for the following components:       Result Value    Glucose 456 (*)     All other components within normal limits   CBC WITH PLATELETS DIFFERENTIAL - Abnormal; Notable for the following components:    RBC Count 4.00 (*)     Hemoglobin 12.6 (*)     Hematocrit 38.3 (*)     All other components within normal limits   COMPREHENSIVE METABOLIC PANEL - Abnormal; Notable for the following components:    Glucose 470 (*)     Calcium 8.0 (*)     Albumin 3.0 (*)     Protein Total 6.2 (*)     Alkaline Phosphatase 180 (*)     All other components within normal limits       Imaging Studies: No results found for this or any previous visit (from the past 24 hour(s)).    Recent vital signs:   /81   Pulse 90   Temp 99  F (37.2  C) (Oral)   Resp 16   Ht 1.626 m (5' 4\")   Wt 70.3 kg (155 lb)   SpO2 100%   BMI 26.61 kg/m      Sheyla Coma Scale Score: 15 (02/25/20 0139)       Cardiac Rhythm: Normal Sinus  Pt needs tele? No  Skin/wound Issues: R anterior thigh abscess     Code Status: Full Code    Pain control: good    Nausea control: good    Abnormal labs/tests/findings requiring intervention: see epic    Family present during ED course? No   Family Comments/Social Situation comments:     Tasks needing completion: None    Melissa Atkinson, RN    [phone] [facility]      "

## 2020-02-25 NOTE — H&P
"[facility], [place]    History and Physical - ED Observation Service       Date of Admission:  2/25/2020    Assessment & Plan   Andrew Lockwood is a 52 year old male admitted on 2/25/2020. He has a history of type 2 diabetes, HIV/AIDS, CNS toxoplasmosis, and recurrent SBOs who presents to the Emergency Department with an cellulitis and hyperglycemia.      1. Left Thigh Cellulitis: presented to the ED 2 days ago with ingrown hair to his right anterior thigh that has been present for the past 5 days; this was I&D'ed with moderate amount of purulent drainage. Discharged with doxycyline but was not filled. In ED, HR 90's, 's/70's-80's, RR 16, SaO2 100% on RA, Temp 99. Labs show CMP with albumin 3.0, TP 6.2, , glucose 470 otherwise normal. CBC with H/H 12.6/38.3 otherwise normal. In the ED the patient was given 1L NS bolus and then started on 125ml/hr, zosyn 3.375mg IV x 1, vancomycin 1500mg IV x 1. On exam of left anterior thigh ~5 cm x 5 cm area of erythema and induration with mild erythema outside circled area.   - Continue with Vancomycin and Zosyn. Discharge with Doxycycline however obtain prescription from pharmacy for patient prior to discharge  - Repeat CBC, BMP, and CRP in the morning  - Start IVF NS at 125 ml/hr  - Tylenol and Ibuprofen prn pain/fever  - Contact precautions    2. DM2, poorly controlled, w/ hyperglycemia: Last A1c 11.6 on 2/11/20. On Lantus 30U BID and Novolog 10-15unit plus carb coverage. Today glucose at [facility] was 511 and sent to the ED. Reports polyuria and polydipsia. States [facility] missed his lantus a few days ago as they \"misplaced\" pen and also have not been giving him the right amount of his short acting insulin. States prior to going in to [facility] he thinks his carb coverage was 1:3. He was seen by Ashley Connelly on 2/11/20. Glucose 346 now.   - Novolog 6 units x 1 now  - Continue 30U glargine BID  - Novolog sliding scale insulin medium " "resistance TID ac meals plus I:C ratio of 1:3 (discuss with Endo as this is unclear)  - Curb side Endocrinology to clarify regimen vs simplify regimen for patient  - Diabetes education (unclear what his carb coverage should be)  - BG checks TID ac, at bedtime, q2am  - Hypoglycemia protocol     3. Disposition: patient is currently at a sober house and has had problems with the facility.  - Assist in patient disposition    Chronic Medical Problems:  # Onychomycosis: - Continue with PTA Ciclopirox and Terbinafine       # HIV/AIDs: Last CD4 count was 490. HIV RNA non detectable at that time. Last ID visit seems to have been on 2/11/20.   - Continue Biktarvy     # Hx LSIL: Anal cytology on 6/22/18 showing LSIL; pt was intolerant of in-office anoscopy for further eval; never followed-up.     # Tobacco use: Does not smoke regularly. Declines Nicotine patch.     Diet: Moderate Consistent CHO Diet    DVT Prophylaxis: Low Risk/Ambulatory with no VTE prophylaxis indicated  Potts Catheter: not present  Code Status: Full Code      Disposition Plan   Expected discharge: Tomorrow, recommended to TBD after SW evaluation once antibiotic plan established and improved glucose, Endocrine consult completed.  Entered: HAO Olmedo 02/25/2020, 5:05 AM     The patient's care was discussed with the Attending Physician, Dr. Cobos.    HAO Olmedo  [facility]  Ascom: [id number]  Please see sticky note for cross cover information  ______________________________________________________________________    Chief Complaint   Hyperglycemia    History is obtained from the patient    History of Present Illness   Per ED Note: \"Andrew Lockwood is a 52 year old male with a history of type 2 diabetes, HIV/AIDS, CNS toxoplasmosis, and recurrent SBOs who presents to the Emergency Department with an cellulitis and hyperglycemia.   Patient was seen in the emergency department last night for " "hyperglycemia and ingrown hair to his right anterior thigh that has been present for the past 5 days.  Patient had ingrown hair with surrounding cellulitis at that time an incision and drainage was performed with moderate amount of purulent drainage and patient was given a dose of doxycycline in the emergency department and discharged home with a prescription of doxycycline.  Patient states that he has been unable to  his prescription for the doxycycline so has not taken any of his antibiotics and has had increase in redness, swelling, and pain to his right anterior thigh.  Patient denies any fever, chills, nausea, vomiting.  Patient also reports that he continues to have hyperglycemia and the sober house is not giving his insulin correctly.  Patient states that they will not give him his corrected sliding dose and now with infection his sugars are higher.  Patient states that he tried to call his primary care provider today but they were unable to get in contact with each other.  No other medical complaints.\"    In the ED, HR 90's, 's/70's-80's, RR 16, SaO2 100% on RA, Temp 99. Labs show CMP with albumin 3.0, TP 6.2, , glucose 470 otherwise normal. CBC with H/H 12.6/38.3 otherwise normal. In the ED the patient was given 1L NS bolus and then started on 125ml/hr, zosyn 3.375mg IV x 1, vancomycin 1500mg IV x 1. He is being admitted for management of cellulitis, hyperglycemia and diligent discharge back to previous sober house or to another sober house with clear communication on insulin regimen.      Review of Systems    All other ROS negative except those mentioned in above note.      Past Medical History    I have reviewed this patient's medical history and updated it with pertinent information if needed.   Past Medical History:   Diagnosis Date     AIDS (H)      Allergic rhinitis due to other allergen     DNS     Chronic abdominal pain      CNS toxoplasmosis (H)      Diabetes type 2, controlled " (H)      GERD (gastroesophageal reflux disease)      HIV (human immunodeficiency virus infection) (H)      Periungual wart      Sleep apnea     doesn't use CPAP       Past Surgical History   I have reviewed this patient's surgical history and updated it with pertinent information if needed.  Past Surgical History:   Procedure Laterality Date     C NONSPECIFIC PROCEDURE      right forearm fracture     COLONOSCOPY Left 1/22/2016    Procedure: COMBINED COLONOSCOPY, SINGLE OR MULTIPLE BIOPSY/POLYPECTOMY BY BIOPSY;  Surgeon: Clark Saini MD;  Location: [facility]     HC EXPLORE UNDESC TESTIS,INGUIN/SCROTAL       LAPAROSCOPIC APPENDECTOMY N/A 1/31/2018    Procedure: LAPAROSCOPIC APPENDECTOMY;  LAPAROSCOPIC APPENDECTOMY;  Surgeon: Dawn Holt MD;  Location: [facility] OR     LAPAROSCOPY DIAGNOSTIC (GENERAL) N/A 7/26/2016    Procedure: LAPAROSCOPY DIAGNOSTIC (GENERAL);  Surgeon: Susannah Arriaga MD;  Location: [facility] OR     LAPAROSCOPY DIAGNOSTIC (GENERAL) N/A 4/16/2018    Procedure: LAPAROSCOPY DIAGNOSTIC (GENERAL);  Diagnostic laparoscopy and lysis of adhesions;  Surgeon: Prince Dowling MD;  Location: [facility] OR     OPTICAL TRACKING SYSTEM CRANIOTOMY, EXCISE TUMOR, COMBINED Left 4/10/2015    Procedure: COMBINED OPTICAL TRACKING SYSTEM CRANIOTOMY, EXCISE TUMOR;  Surgeon: Mirlande Colmenares MD;  Location: [facility] OR     REPAIR GAMEKEEPER'S THUMB Right 12/2/2016    Procedure: REPAIR LIGAMENT ULNAR COLLATERAL THUMB (GAMEKEEPER'S);  Surgeon: Evin Zamorano MD;  Location: [facility]       Social History   I have reviewed this patient's social history and updated it with pertinent information if needed.  Social History     Tobacco Use     Smoking status: Current Some Day Smoker     Packs/day: 0.25     Types: Cigarettes     Last attempt to quit: 10/28/2016     Years since quitting: 3.3     Smokeless tobacco: Former User   Substance Use Topics     Alcohol use: No     Alcohol/week: 0.0 standard drinks     Comment: Last  etoh in      Drug use: Not Currently     Types: Marijuana     Comment: Sober 32 days on 2020       Family History   I have reviewed this patient's family history and updated it with pertinent information if needed.   Family History   Problem Relation Age of Onset     Diabetes Brother      Diabetes Father      Alzheimer Disease Father      Unknown/Adopted Mother      Diabetes Paternal Grandfather      Cancer No family hx of         no skin cancer     Skin Cancer No family hx of         no famiy hx of skin cancer     Glaucoma No family hx of      Macular Degeneration No family hx of        Prior to Admission Medications   Prior to Admission Medications   Prescriptions Last Dose Informant Patient Reported? Taking?   Ciclopirox 8 % KIT   No No   Sig: Externally apply 1 Application topically daily In evening, remove from nails once weekly with alcohol   HUMALOG KWIKPEN 100 UNIT/ML soln   No No   Sig: Inject 10 - 15 units with meals, plus correction.  Pt uses approx 60 units in 24 hrs.   Nutritional Supplements (GLUCOSE MANAGEMENT) TABS   No No   Sig: Use 1-2 tabs for hypoglycemia.   SENNA-docusate sodium (SENNA S) 8.6-50 MG tablet   No No   Sig: Take 1 tablet by mouth At Bedtime for 7 days   Tavaborole 5 % EX topical solution   No No   Sig: Apply 1 mL topically daily   bictegravir-emtricitabine-tenofovir (BIKTARVY) -25 MG per tablet   No No   Sig: Take 1 tablet by mouth daily   blood glucose (NO BRAND SPECIFIED) lancets standard   No No   Sig: Use to test blood sugar four times daily or as directed.   blood glucose (NO BRAND SPECIFIED) test strip   No No   Si strip by In Vitro route 4 times daily (before meals and nightly) Use to test blood sugars 4 times daily or as directed   blood glucose monitoring (NO BRAND SPECIFIED) meter device kit   No No   Sig: Use to test blood sugar 4 times daily or as directed.   blood glucose monitoring (NO BRAND SPECIFIED) meter device kit   No No   Sig: Use to test  blood sugar 4 times daily or as directed.   cetirizine (ZYRTEC) 10 MG tablet   No No   Sig: Take 1 tablet (10 mg) by mouth daily   doxycycline hyclate (VIBRAMYCIN) 100 MG capsule   No No   Sig: Take 1 capsule (100 mg) by mouth 2 times daily for 7 days   famotidine (PEPCID) 20 MG tablet   No No   Sig: Take 1 tablet (20 mg) by mouth 2 times daily   ibuprofen (ADVIL/MOTRIN) 200 MG tablet   Yes No   Sig: Take 600 mg by mouth every 4 hours as needed for mild pain   insulin glargine (LANTUS PEN) 100 UNIT/ML pen   Yes No   Sig: Inject 30 Units Subcutaneous 2 times daily   insulin pen needle (B-D U/F) 31G X 8 MM miscellaneous   No No   Sig: Use 1 pen needles daily or as directed.   insulin pen needle (BD PEN NEEDLE SCOTT 2ND GEN) 32G X 4 MM miscellaneous   No No   Sig: Use 3-4 daily.   mupirocin (BACTROBAN) 2 % external ointment   No No   Sig: Apply topically 3 times daily   nicotine 21 MG/24HR TD 24 hr patch   No No   Sig: Place 1 patch onto the skin every 24 hours   ondansetron (ZOFRAN-ODT) 4 MG ODT tab   No No   Sig: Take 1 tablet (4 mg) by mouth every 6 hours as needed for nausea or vomiting   polyethylene glycol (MIRALAX) powder   No No   Sig: Take 17 g (1 capful) by mouth daily as needed for constipation   terbinafine 250 MG PO tablet   No No   Sig: Take 1 tablet (250 mg) by mouth daily      Facility-Administered Medications Last Administration Doses Remaining   lidocaine (PF) (XYLOCAINE) 1 % injection 4 mL 1/29/2020  4:06 PM    lidocaine (PF) (XYLOCAINE) 1 % injection 5 mL 2/13/2020  2:48 PM    triamcinolone (KENALOG-40) injection 40 mg 1/29/2020  4:06 PM    triamcinolone (KENALOG-40) injection 40 mg 2/13/2020  2:48 PM         Allergies   Allergies   Allergen Reactions     Metformin      Abdominal pain     Milk [Lac Bovis] Hives     Dulaglutide Rash       Physical Exam   Vital Signs: Temp: 99  F (37.2  C) Temp src: Oral BP: 130/81 Pulse: 90   Resp: 16 SpO2: 100 % O2 Device: None (Room air)    Weight: 155 lbs 0  oz    Constitutional: awake, alert, cooperative, no apparent distress, and appears stated age  Eyes: Lids and lashes normal, pupils equal, round and reactive to light, extra ocular muscles intact, sclera clear, conjunctiva normal  ENT: Normocephalic, without obvious abnormality, atraumatic, sinuses nontender on palpation, external ears without lesions, oral pharynx with moist mucous membranes, tonsils without erythema or exudates, gums normal and good dentition.  Hematologic / Lymphatic: no cervical lymphadenopathy  Respiratory: No increased work of breathing, good air exchange, clear to auscultation bilaterally, no crackles or wheezing  Cardiovascular: Normal apical impulse, regular rate and rhythm, normal S1 and S2, no S3 or S4, and no murmur noted  GI: No scars, normal bowel sounds, soft, non-distended, non-tender, no masses palpated, no hepatosplenomegally  Skin: left anterior thigh ~5 cm x 5 cm area of erythema and induration with mild erythema outside circled area.   Musculoskeletal: There is no redness, warmth, or swelling of the joints.  Full range of motion noted.  Motor strength is 5 out of 5 all extremities bilaterally.  Tone is normal.  Neurologic: Awake, alert, oriented to name, place and time.  Cranial nerves II-XII are grossly intact.  Motor is 5 out of 5 bilaterally.  Cerebellar finger to nose, heel to shin intact.  Sensory is intact.  Babinski down going, Romberg negative, and gait is normal.  Neuropsychiatric: General: normal, calm and normal eye contact    Data   Data reviewed today: I reviewed all medications, new labs and imaging results over the last 24 hours.   Recent Labs   Lab 02/25/20 0042   WBC 7.6   HGB 12.6*   MCV 96         POTASSIUM 3.9   CHLORIDE 104   CO2 27   BUN 18   CR 0.75   ANIONGAP 4   LINDA 8.0*   *   ALBUMIN 3.0*   PROTTOTAL 6.2*   BILITOTAL 0.5   ALKPHOS 180*   ALT 27   AST 18     Most Recent 3 CBC's:  Recent Labs   Lab Test 02/25/20 0042 02/14/20  1145  02/14/20  1032 02/11/20  1711   WBC 7.6  --  10.8 9.4   HGB 12.6* 13.3 14.3 14.1   MCV 96  --  92 91     --  320 303     Most Recent 3 BMP's:  Recent Labs   Lab Test 02/25/20  0042 02/14/20  1145 02/14/20  1032 02/11/20  1711    135 133 130*   POTASSIUM 3.9 3.9 4.0 3.8   CHLORIDE 104  --  103 98   CO2 27  --  24 25   BUN 18  --  17 22   CR 0.75  --  0.72 0.88   ANIONGAP 4  --  7 7   LINDA 8.0*  --  9.3 8.7   * 405* 462* 434*     Most Recent 2 LFT's:  Recent Labs   Lab Test 02/25/20  0042 02/14/20  1032   AST 18 11   ALT 27 28   ALKPHOS 180* 153*   BILITOTAL 0.5 0.9     Most Recent 3 Creatinines:  Recent Labs   Lab Test 02/25/20  0042 02/14/20  1032 02/11/20  1711   CR 0.75 0.72 0.88     Most Recent 3 Hemoglobins:  Recent Labs   Lab Test 02/25/20  0042 02/14/20  1145 02/14/20  1032   HGB 12.6* 13.3 14.3     Most Recent Hemoglobin A1c:  Recent Labs   Lab Test 02/11/20  1711   A1C 11.6*     Most Recent 6 glucoses:  Recent Labs   Lab Test 02/25/20  0042 02/14/20  1145 02/14/20  1032 02/11/20  1711 02/07/20  1101 02/03/20  1551   * 405* 462* 434* 330* 400*     Most Recent Urinalysis:  Recent Labs   Lab Test 02/14/20  1148  02/03/20  1603   COLOR Light Yellow   < > Light Yellow   APPEARANCE Clear   < > Clear   URINEGLC >1000*   < > >1000*   URINEBILI Negative   < > Negative   URINEKETONE Negative   < > Negative   SG 1.035   < > 1.027   UBLD Negative   < > Negative   URINEPH 6.0   < > 6.5   PROTEIN Negative   < > Negative   NITRITE Negative   < > Negative   LEUKEST Negative   < > Negative   RBCU  --   --  1   WBCU  --   --  1    < > = values in this interval not displayed.     No results found for this or any previous visit (from the past 24 hour(s)).

## 2020-02-25 NOTE — ED PROVIDER NOTES
History     Chief Complaint   Patient presents with     Hyperglycemia     HPI  The history is provided by the patient and medical records.      Andrew Lockwood is a 52 year old male with a history of type 2 diabetes, HIV/AIDS, CNS toxoplasmosis, and recurrent SBOs who presents to the Emergency Department with an cellulitis and hyperglycemia.   Patient was seen in the emergency department last night by himself for hyperglycemia and ingrown hair to his right anterior thigh that has been present for the past 5 days.  Patient had ingrown hair with surrounding cellulitis at that time an incision and drainage was performed with moderate amount of purulent drainage and patient was given a dose of doxycycline in the emergency department and discharged home with a prescription of doxycycline.  Patient states that he has been unable to  his prescription for the doxycycline so has not taken any of his antibiotics and has had increase in redness, swelling, and pain to his right anterior thigh.  Patient denies any fever, chills, nausea, vomiting.  Patient also reports that he continues to have hyperglycemia and the sober house is not giving his insulin correctly.  Patient states that they will not give him his corrected sliding dose and now with infection his sugars are higher.  Patient states that he tried to call his primary care provider today but they were unable to get in contact with each other.  No other medical complaints.    I have reviewed the Medications, Allergies, Past Medical and Surgical History, and Social History in the Epic system.    Review of Systems   Constitutional: Negative for fever.   Eyes: Negative for visual disturbance.   Respiratory: Negative for shortness of breath.    Cardiovascular: Negative for chest pain.   Gastrointestinal: Negative for abdominal pain.   Endocrine:        Hyperglycemia   Musculoskeletal:        Right thigh pain, swelling, redness    Allergic/Immunologic: Positive for  "immunocompromised state.   Neurological: Negative for weakness.   Hematological: Does not bruise/bleed easily.   Psychiatric/Behavioral: Negative for confusion.       Physical Exam   BP: 134/77  Pulse: 98  Temp: 99  F (37.2  C)  Resp: 16  Height: 162.6 cm (5' 4\")  Weight: 70.3 kg (155 lb)  SpO2: 100 %      Physical Exam  Musculoskeletal:        Legs:        General: Afebrile, no acute distress.   HENT: MMM, no oropharyngeal lesions  Eyes: PERRL, normal sclerae  Neck: non-tender, supple  Cardio: regaular rate. Regular rhythm. Extremities well perfused  Resp: Normal work of breathing, clear breath sounds  Chest/Back: no visual signs of trauma, no CVA tenderness  Abdomen: no tenderness, non-distended, no rebound, no guarding  Neuro: alert and fully oriented. CN II-XII grossly intact. Grossly normal strength and sensation in all extremities.   MSK: +right mid anterior thigh with prior area of cellulitis now with increased redness, swelling, tenderness to palpation   Integumentary/Skin: no rash visualized, normal color  Psych: normal affect, normal behavior  ED Course        Procedures  .  Results for orders placed or performed during the hospital encounter of 02/25/20   Glucose by meter     Status: Abnormal   Result Value Ref Range    Glucose 456 (H) 70 - 99 mg/dL   CBC with platelets differential     Status: Abnormal   Result Value Ref Range    WBC 7.6 4.0 - 11.0 10e9/L    RBC Count 4.00 (L) 4.4 - 5.9 10e12/L    Hemoglobin 12.6 (L) 13.3 - 17.7 g/dL    Hematocrit 38.3 (L) 40.0 - 53.0 %    MCV 96 78 - 100 fl    MCH 31.5 26.5 - 33.0 pg    MCHC 32.9 31.5 - 36.5 g/dL    RDW 13.9 10.0 - 15.0 %    Platelet Count 259 150 - 450 10e9/L    Diff Method Automated Method     % Neutrophils 58.2 %    % Lymphocytes 29.8 %    % Monocytes 7.1 %    % Eosinophils 2.9 %    % Basophils 0.7 %    % Immature Granulocytes 1.3 %    Nucleated RBCs 0 0 /100    Absolute Neutrophil 4.4 1.6 - 8.3 10e9/L    Absolute Lymphocytes 2.3 0.8 - 5.3 10e9/L    " Absolute Monocytes 0.5 0.0 - 1.3 10e9/L    Absolute Eosinophils 0.2 0.0 - 0.7 10e9/L    Absolute Basophils 0.1 0.0 - 0.2 10e9/L    Abs Immature Granulocytes 0.1 0 - 0.4 10e9/L    Absolute Nucleated RBC 0.0    Comprehensive metabolic panel     Status: Abnormal   Result Value Ref Range    Sodium 135 133 - 144 mmol/L    Potassium 3.9 3.4 - 5.3 mmol/L    Chloride 104 94 - 109 mmol/L    Carbon Dioxide 27 20 - 32 mmol/L    Anion Gap 4 3 - 14 mmol/L    Glucose 470 (H) 70 - 99 mg/dL    Urea Nitrogen 18 7 - 30 mg/dL    Creatinine 0.75 0.66 - 1.25 mg/dL    GFR Estimate >90 >60 mL/min/[1.73_m2]    GFR Estimate If Black >90 >60 mL/min/[1.73_m2]    Calcium 8.0 (L) 8.5 - 10.1 mg/dL    Bilirubin Total 0.5 0.2 - 1.3 mg/dL    Albumin 3.0 (L) 3.4 - 5.0 g/dL    Protein Total 6.2 (L) 6.8 - 8.8 g/dL    Alkaline Phosphatase 180 (H) 40 - 150 U/L    ALT 27 0 - 70 U/L    AST 18 0 - 45 U/L       Assessments & Plan (with Medical Decision Making)   Andrew Lockwood is a 52 year old male with a history of type 2 diabetes, HIV/AIDS, CNS toxoplasmosis, and recurrent SBOs who presents to the Emergency Department with an cellulitis and hyperglycemia.  Upon arrival patient is well-appearing, afebrile, no distress.  Patient hemodynamically stable and vital signs are within normal limits.  Patient here with increasing cellulitis to his right lower extremity (thigh) status post incision and drainage last night in the emergency department by myself, patient received 1 dose of antibiotics in the emergency department however did not  his antibiotic today so has not taken any of the medication.  Patient also with continued hyperglycemia which seems to be related to his social situation as he is living in a sober house and he states they are not giving him his insulin correctly and are not performing a sliding scale correction.  I Reviewed comprehensive labs which are remarkable for white blood cell count of 7.6, hemoglobin 12.6, hyperglycemia with  glucose 470, no other acute metabolic or electrolyte abnormality.  At this time as patient is unable to get his prescription filled, is not getting his correct insulin dose, now with worsening cellulitis will give him a dose of zosyn, Vancomycin in the ED and will plan on admission to ED observation for control of his hyperglycemia, antibiotics and /DM educator for continued management of his cellulitis and hyperglycemia.  Patient understands and agrees with the plan.  Discussed with nurse practitioner in the ED observation unit.    I have reviewed the nursing notes.    I have reviewed the findings, diagnosis, plan and need for follow up with the patient.    New Prescriptions    No medications on file       Final diagnoses:   Cellulitis of right lower extremity   Hyperglycemia       2/25/2020   [facility], [place], EMERGENCY DEPARTMENT     Sheila Bruno MD  02/25/20 0559

## 2020-02-25 NOTE — TELEPHONE ENCOUNTER
Spoke with Yane and informed her per Jacquelyn Connelly to go off patient discharge instructions after he is discharged from the hospital call us if adjustments need to be made.

## 2020-02-25 NOTE — DISCHARGE SUMMARY
"ED Observation Discharge Summary    Andrew Lockwood   MRN# [id number]  Age: 52 year old   YOB: 1967            Date of Admission: 02/25/2020    Date of Discharge: 02/26/2020  Admitting Physician: Dr. Zain Cobos MD  Discharge Physician: DOLLY ORELLANA  NP/PA: Anita Loaiza PA-C        DISCHARGE DIAGNOSIS:   ##Left Thigh Cellulitis  ##DM2, poorly controlled, w/ hyperglycemia    INTERVAL HISTORY: VSS, afebrile. Up ad lexi. Infection site improving. BG under better control and feels ready to discharge to home.     PHYSICAL EXAM:   Blood pressure 121/83, pulse 84, temperature 98.3  F (36.8  C), temperature source Oral, resp. rate 16, height 1.626 m (5' 4\"), weight 70.3 kg (155 lb), SpO2 99 %.     GENERAL APPEARENCE: A/O x4. NAD.  SKIN: Clean, dry, and intact  HEENT/NECK: NCAT w/out masses, lesions, or abnormalities. Sclera anicteric, PERRLA, EOMI.  Oral mucosa pink and moist without erythema, exudate, lesions, ulcerations, or thrush. Teeth and gums normal. Neck supple, no masses. No jugular venous distention.   CARDIOVASCULAR: S1, S2 RRR. No murmurs, rubs, or gallops.   RESPIRATORY: Respiratory effort WNL. CTA  bilaterally without crackles/rales/wheeze   GI: Active BS in all 4 quadrants. Abdomen soft and non-tender. No masses or hepatosplenomegaly.  : Deferred  MUSCULOSKELETAL:  Extremities normal, no gross deformities noted, non-tender to palpation.   PV: 2+ bilateral radial and pedal pulses. No edema noted.   NEURO: CN II-XII grossly intact. Speech normal. Appropriate throughout interview.   HEME/LYMPH: No visible bleeding.   PSYCHIATRIC: Mentation and affect appear normal  VASCULAR ACCESS: CDI without erythema or discharge. Non-tender.    PROCEDURES AND IMAGING:   Results for orders placed or performed during the hospital encounter of 02/25/20 (from the past 24 hour(s))   Glucose by meter   Result Value Ref Range    Glucose 456 (H) 70 - 99 mg/dL   CBC with platelets differential   Result " Value Ref Range    WBC 7.6 4.0 - 11.0 10e9/L    RBC Count 4.00 (L) 4.4 - 5.9 10e12/L    Hemoglobin 12.6 (L) 13.3 - 17.7 g/dL    Hematocrit 38.3 (L) 40.0 - 53.0 %    MCV 96 78 - 100 fl    MCH 31.5 26.5 - 33.0 pg    MCHC 32.9 31.5 - 36.5 g/dL    RDW 13.9 10.0 - 15.0 %    Platelet Count 259 150 - 450 10e9/L    Diff Method Automated Method     % Neutrophils 58.2 %    % Lymphocytes 29.8 %    % Monocytes 7.1 %    % Eosinophils 2.9 %    % Basophils 0.7 %    % Immature Granulocytes 1.3 %    Nucleated RBCs 0 0 /100    Absolute Neutrophil 4.4 1.6 - 8.3 10e9/L    Absolute Lymphocytes 2.3 0.8 - 5.3 10e9/L    Absolute Monocytes 0.5 0.0 - 1.3 10e9/L    Absolute Eosinophils 0.2 0.0 - 0.7 10e9/L    Absolute Basophils 0.1 0.0 - 0.2 10e9/L    Abs Immature Granulocytes 0.1 0 - 0.4 10e9/L    Absolute Nucleated RBC 0.0    Comprehensive metabolic panel   Result Value Ref Range    Sodium 135 133 - 144 mmol/L    Potassium 3.9 3.4 - 5.3 mmol/L    Chloride 104 94 - 109 mmol/L    Carbon Dioxide 27 20 - 32 mmol/L    Anion Gap 4 3 - 14 mmol/L    Glucose 470 (H) 70 - 99 mg/dL    Urea Nitrogen 18 7 - 30 mg/dL    Creatinine 0.75 0.66 - 1.25 mg/dL    GFR Estimate >90 >60 mL/min/[1.73_m2]    GFR Estimate If Black >90 >60 mL/min/[1.73_m2]    Calcium 8.0 (L) 8.5 - 10.1 mg/dL    Bilirubin Total 0.5 0.2 - 1.3 mg/dL    Albumin 3.0 (L) 3.4 - 5.0 g/dL    Protein Total 6.2 (L) 6.8 - 8.8 g/dL    Alkaline Phosphatase 180 (H) 40 - 150 U/L    ALT 27 0 - 70 U/L    AST 18 0 - 45 U/L   Glucose by meter   Result Value Ref Range    Glucose 346 (H) 70 - 99 mg/dL   Glucose by meter   Result Value Ref Range    Glucose 261 (H) 70 - 99 mg/dL   CBC with platelets differential   Result Value Ref Range    WBC 7.1 4.0 - 11.0 10e9/L    RBC Count 3.98 (L) 4.4 - 5.9 10e12/L    Hemoglobin 12.4 (L) 13.3 - 17.7 g/dL    Hematocrit 37.5 (L) 40.0 - 53.0 %    MCV 94 78 - 100 fl    MCH 31.2 26.5 - 33.0 pg    MCHC 33.1 31.5 - 36.5 g/dL    RDW 13.6 10.0 - 15.0 %    Platelet Count 238 150  - 450 10e9/L    Diff Method Automated Method     % Neutrophils 57.4 %    % Lymphocytes 31.0 %    % Monocytes 6.5 %    % Eosinophils 3.3 %    % Basophils 0.4 %    % Immature Granulocytes 1.4 %    Nucleated RBCs 0 0 /100    Absolute Neutrophil 4.1 1.6 - 8.3 10e9/L    Absolute Lymphocytes 2.2 0.8 - 5.3 10e9/L    Absolute Monocytes 0.5 0.0 - 1.3 10e9/L    Absolute Eosinophils 0.2 0.0 - 0.7 10e9/L    Absolute Basophils 0.0 0.0 - 0.2 10e9/L    Abs Immature Granulocytes 0.1 0 - 0.4 10e9/L    Absolute Nucleated RBC 0.0    Comprehensive metabolic panel   Result Value Ref Range    Sodium 137 133 - 144 mmol/L    Potassium 3.8 3.4 - 5.3 mmol/L    Chloride 108 94 - 109 mmol/L    Carbon Dioxide 23 20 - 32 mmol/L    Anion Gap 5 3 - 14 mmol/L    Glucose 249 (H) 70 - 99 mg/dL    Urea Nitrogen 13 7 - 30 mg/dL    Creatinine 0.62 (L) 0.66 - 1.25 mg/dL    GFR Estimate >90 >60 mL/min/[1.73_m2]    GFR Estimate If Black >90 >60 mL/min/[1.73_m2]    Calcium 8.2 (L) 8.5 - 10.1 mg/dL    Bilirubin Total 0.7 0.2 - 1.3 mg/dL    Albumin 2.9 (L) 3.4 - 5.0 g/dL    Protein Total 5.9 (L) 6.8 - 8.8 g/dL    Alkaline Phosphatase 135 40 - 150 U/L    ALT 24 0 - 70 U/L    AST 14 0 - 45 U/L   CRP inflammation   Result Value Ref Range    CRP Inflammation 17.0 (H) 0.0 - 8.0 mg/L   Erythrocyte sedimentation rate auto   Result Value Ref Range    Sed Rate 21 (H) 0 - 20 mm/h   Glucose by meter   Result Value Ref Range    Glucose 197 (H) 70 - 99 mg/dL   Endocrine Diabetes Adult IP Consult: Patient to be seen: Routine within 24 hrs; Call back #: [phone]; elevated BG, diabetes management; Consultant may enter orders: Yes; Requesting provider? Other supervising provider; Name: Haley Bui PA-C     2/25/2020 12:31 PM  Brief Diabetes Curbside Consult:  Discussed recommendations below with primary team.    Andrew is a 52 year old male with uncontrolled TIIDM (most recent   A1c 11.6%) who is being admitted to observation with left  thigh   cellulitis and hyperglycemia.     He noted to primary providers that there was a recent issue with   his sober [facility] providers misplacing his Lantus insulin, and he   missed at least 2-3 days of insulin; he also reported that they   are only administering 5 units of fixed meal insulin (PTA list   states Humalog 10-15 units with meals, plus correction). It is   unclear why facility not adhering to orders, no reports of   hypoglycemia as a reason medications are being reduced/withheld.     He did recently see [facility] this month in clinic;   reviewed importance of adherence to his insulin regimen (thus,   the impression that he is responsible for self administration of   insulin, not staff at [facility]?). It seemed as though   BG had been improving recently, per Jacquelyn Connelly's note.     BG on presentation 456>470>346. Received 6 units of aspart with   subsequent BG improving 261>249>197. No laboratory evidence for   DKA on admission. Renal function is WNL.    Primary team has resumed his PTA basal insulin regimen (glargine   30 units BID), and 1:3g CHO with moderate intensity sliding scale   AC/HS. He may discharge later today. He is tolerating a diet.    Recommendations:  -continue Lantus 30 units BID  -balance basal/bolus requirements (thus, would reduce to 1:4g CHO   and increase to high intensity sliding scale AC/HS)  -for discharge, recommend clear instruction to give 15 units   fixed meal insulin with each meal (this is based upon 1:4g CHO   for average 60g CHO intake per meal, can adjust based upon his   diet in the outpatient setting)  -would continue high intensity sliding scale AC/HS upon   discharge; scales are posted below     High Intensity  Sliding Scale (1:25)  Mealtime Scale, to be given based on blood sugar obtained prior   to a meal  For Pre-Meal BG less than 140, no additional insulin needed  For Pre-Meal  - 164 give 1 unit.   For Pre-Meal  - 189 give 2  units.   For Pre-Meal  - 214 give 3 units.   For Pre-Meal  - 239 give 4 units.   For Pre-Meal  - 264 give 5 units.   For Pre-Meal  - 289 give 6 units.   For Pre-Meal  - 314 give 7 units.   For Pre-Meal  - 339 give 8 units.   For Pre-Meal BG greater than 340 give 9 units.     Bedtime Scale, to be given based on pre-bedtime blood sugar  For Bedtime BG less than 200, no additional insulin needed  For  - 224 give 1 unit.   For  - 249 give 2 units.   For  - 274 give 3 units.   For  - 299 give 4 units.   For  - 324 give 5 units.   For  - 349 give 6 units.   For BG greater than 350 give 7 units.     He should continue to check BG before meals and at bedtime, and   should follow up with [facility] 3-4 weeks following   discharge for review of BG and medications, with further   titration as needed, with consideration of alternative   non-insulin agents to treat diabetes, if medically appropriate.     Signing of this note resolves consult request; please do not   hesitate to call back with questions if patient to remain   hospitalized and if BG do not improve on the above regimen.     Haley Shrestha PA-C  Diabetes Management Service  Pager [phone]   Job code [id number]   Glucose by meter   Result Value Ref Range    Glucose 212 (H) 70 - 99 mg/dL   UA with Microscopic reflex to Culture   Result Value Ref Range    Color Urine Light Yellow     Appearance Urine Clear     Glucose Urine >1000 (A) NEG^Negative mg/dL    Bilirubin Urine Negative NEG^Negative    Ketones Urine Negative NEG^Negative mg/dL    Specific Gravity Urine 1.018 1.003 - 1.035    Blood Urine Negative NEG^Negative    pH Urine 6.0 5.0 - 7.0 pH    Protein Albumin Urine Negative NEG^Negative mg/dL    Urobilinogen mg/dL Normal 0.0 - 2.0 mg/dL    Nitrite Urine Negative NEG^Negative    Leukocyte Esterase Urine Negative NEG^Negative    Source Midstream Urine     WBC Urine <1 0 - 5 /HPF    RBC  Urine <1 0 - 2 /HPF    Mucous Urine Present (A) NEG^Negative /LPF   Glucose by meter   Result Value Ref Range    Glucose 165 (H) 70 - 99 mg/dL   Wound Culture Aerobic Bacterial   Result Value Ref Range    Specimen Description Thigh     Special Requests Specimen collected in eSwab transport (white cap)     Culture Micro PENDING      *Note: Due to a large number of results and/or encounters for the requested time period, some results have not been displayed. A complete set of results can be found in Results Review.     DISCHARGE MEDICATIONS:   Current Discharge Medication List      CONTINUE these medications which have NOT CHANGED    Details   bictegravir-emtricitabine-tenofovir (BIKTARVY) -25 MG per tablet Take 1 tablet by mouth daily  Qty: 30 tablet, Refills: 11    Associated Diagnoses: HIV infection, unspecified symptom status (H)      blood glucose (NO BRAND SPECIFIED) lancets standard Use to test blood sugar four times daily or as directed.  Qty: 100 each, Refills: 11    Comments: Please provide lancets that are covered by insurance.  Associated Diagnoses: Type 2 diabetes mellitus without complication, without long-term current use of insulin (H)      blood glucose (NO BRAND SPECIFIED) test strip 1 strip by In Vitro route 4 times daily (before meals and nightly) Use to test blood sugars 4 times daily or as directed  Qty: 100 strip, Refills: 11    Comments: Please provide test strips that are covered by insurance.  Associated Diagnoses: Type 2 diabetes mellitus with complication, without long-term current use of insulin (H)      !! blood glucose monitoring (NO BRAND SPECIFIED) meter device kit Use to test blood sugar 4 times daily or as directed.  Qty: 1 kit, Refills: 0    Associated Diagnoses: Type 2 diabetes mellitus without complication, with long-term current use of insulin (H)      !! blood glucose monitoring (NO BRAND SPECIFIED) meter device kit Use to test blood sugar 4 times daily or as  directed.  Qty: 1 kit, Refills: 0    Comments: Please provide meter that is covered by insurance.  Associated Diagnoses: Type 2 diabetes mellitus with complication, without long-term current use of insulin (H)      cetirizine (ZYRTEC) 10 MG tablet Take 1 tablet (10 mg) by mouth daily  Qty: 30 tablet, Refills: 0      Ciclopirox 8 % KIT Externally apply 1 Application topically daily In evening, remove from nails once weekly with alcohol  Qty: 34 mL, Refills: 1    Associated Diagnoses: Onychomycosis      doxycycline hyclate (VIBRAMYCIN) 100 MG capsule Take 1 capsule (100 mg) by mouth 2 times daily for 7 days  Qty: 14 capsule, Refills: 0      famotidine (PEPCID) 20 MG tablet Take 1 tablet (20 mg) by mouth 2 times daily  Qty: 60 tablet, Refills: 1    Associated Diagnoses: Gastroesophageal reflux disease without esophagitis      HUMALOG KWIKPEN 100 UNIT/ML soln Inject 10 - 15 units with meals, plus correction.  Pt uses approx 60 units in 24 hrs.  Qty: 15 mL, Refills: 3    Associated Diagnoses: Type 2 diabetes mellitus with hyperglycemia, without long-term current use of insulin (H)      ibuprofen (ADVIL/MOTRIN) 200 MG tablet Take 600 mg by mouth every 4 hours as needed for mild pain      insulin glargine (LANTUS PEN) 100 UNIT/ML pen Inject 30 Units Subcutaneous 2 times daily  Qty: 15 mL, Refills: 3    Comments: If Lantus is not covered by insurance, may substitute Basaglar at same dose and frequency.    Associated Diagnoses: Hyperglycemia      !! insulin pen needle (B-D U/F) 31G X 8 MM miscellaneous Use 1 pen needles daily or as directed.  Qty: 100 each, Refills: 3    Associated Diagnoses: Type 2 diabetes mellitus without complication, without long-term current use of insulin (H)      !! insulin pen needle (BD PEN NEEDLE SCOTT 2ND GEN) 32G X 4 MM miscellaneous Use 3-4 daily.  Qty: 250 each, Refills: 3    Associated Diagnoses: Type 2 diabetes mellitus with hyperglycemia, without long-term current use of insulin (H)     "  mupirocin (BACTROBAN) 2 % external ointment Apply topically 3 times daily  Qty: 30 g, Refills: 0    Associated Diagnoses: Folliculitis; Human immunodeficiency virus (HIV) disease (H)      nicotine 21 MG/24HR TD 24 hr patch Place 1 patch onto the skin every 24 hours  Qty: 28 patch, Refills: 0    Associated Diagnoses: Cigarette nicotine dependence with nicotine-induced disorder      Nutritional Supplements (GLUCOSE MANAGEMENT) TABS Use 1-2 tabs for hypoglycemia.  Qty: 30 tablet, Refills: 3    Associated Diagnoses: Type 2 diabetes mellitus with hyperglycemia, without long-term current use of insulin (H)      ondansetron (ZOFRAN-ODT) 4 MG ODT tab Take 1 tablet (4 mg) by mouth every 6 hours as needed for nausea or vomiting  Qty: 12 tablet, Refills: 0    Associated Diagnoses: SBO (small bowel obstruction) (H)      polyethylene glycol (MIRALAX) powder Take 17 g (1 capful) by mouth daily as needed for constipation  Qty: 527 g, Refills: 0      Tavaborole 5 % EX topical solution Apply 1 mL topically daily  Qty: 10 mL, Refills: 2    Associated Diagnoses: Onychomycosis      terbinafine 250 MG PO tablet Take 1 tablet (250 mg) by mouth daily  Qty: 90 tablet, Refills: 0    Associated Diagnoses: Onychomycosis       !! - Potential duplicate medications found. Please discuss with provider.      STOP taking these medications       SENNA-docusate sodium (SENNA S) 8.6-50 MG tablet Comments:   Reason for Stopping:                 CONSULTATIONS:   Consultation during this admission received from:    BRIEF HISTORY OF PRESENT ILLNESS:   (Adopted from admission H&P).    \"Andrew Lockwood is a 52 year old male with a history of type 2 diabetes, HIV/AIDS, CNS toxoplasmosis, and recurrent SBOs who presents to the Emergency Department with an cellulitis and hyperglycemia.   Patient was seen in the emergency department last night for hyperglycemia and ingrown hair to his right anterior thigh that has been present for the past 5 days.  Patient had " "ingrown hair with surrounding cellulitis at that time an incision and drainage was performed with moderate amount of purulent drainage and patient was given a dose of doxycycline in the emergency department and discharged home with a prescription of doxycycline.  Patient states that he has been unable to  his prescription for the doxycycline so has not taken any of his antibiotics and has had increase in redness, swelling, and pain to his right anterior thigh.  Patient denies any fever, chills, nausea, vomiting.  Patient also reports that he continues to have hyperglycemia and the sober house is not giving his insulin correctly.  Patient states that they will not give him his corrected sliding dose and now with infection his sugars are higher.  Patient states that he tried to call his primary care provider today but they were unable to get in contact with each other.  No other medical complaints.\"     In the ED, HR 90's, 's/70's-80's, RR 16, SaO2 100% on RA, Temp 99. Labs show CMP with albumin 3.0, TP 6.2, , glucose 470 otherwise normal. CBC with H/H 12.6/38.3 otherwise normal. In the ED the patient was given 1L NS bolus and then started on 125ml/hr, zosyn 3.375mg IV x 1, vancomycin 1500mg IV x 1. He is being admitted for management of cellulitis, hyperglycemia and diligent discharge back to previous sober house or to another sober house with clear communication on insulin regimen.\"    ED OBSERVATION COURSE:  Andrew Lockwood is a 52 year old male admitted on 2/25/2020. He has a history of type 2 diabetes, HIV/AIDS, CNS toxoplasmosis, and recurrent SBOs who presented to the ED with cellulitis and hyperglycemia.       ##Left Thigh Cellulitis: initially presented to the ED 2/23 with an ingrown hair to his right anterior thigh that had been present for the past 5 days; This was I&D'ed with moderate amount of purulent drainage. Discharged with doxycyline but was not filled. Now presenting with " "hyperglycemia and worsening cellulitis. HD stable, afebrile. No leukocytosis. He was started on vancomycin and zosyn.  On exam of left anterior thigh ~5 cm x 5 cm area of erythema and induration with mild erythema outside circled area. While in observation he was treated with Vancomycin and Zosyn. His site was I & D'd again. Cultures were sent and pending at discharge. Will transition to Doxycycline on discharge. No leukocytosis. Clinically improving.           ##DM2, poorly controlled, w/ hyperglycemia: Last A1c 11.6 on 2/11/20. On Lantus 30U BID and Novolog 10-15unit plus carb coverage. Today glucose at [facility] was 511 and sent to the ED. Reports polyuria and polydipsia. States [facility] missed his lantus a few days ago as they \"misplaced\" pen and also have not been giving him the right amount of his short acting insulin. States prior to going in to [facility] he thinks his carb coverage was 1:3. He was seen by Ashley Connelly on 2/11/20. Glucose improving since admission   - Discussed with Endocrinology who recommeded:   - Continue 30U glargine BID  - Increased sliding scale insulin to high resistance   - Carb counting with 1:4 ratio.   - At discharge recommend clear instruction to give 15 units fixed meal insulin with each meal and continue high intensity sliding scale AC/HS  - Resume BG checks TID ac, at bedtime, q2am     ##Disposition: patient is currently at a sober house and has had problems with the facility.  - SW consulted and assisted with transport. Patient was provided with bus tokens at discharge.         Chronic Medical Problems:  ##Onychomycosis:   - Continue with PTA Ciclopirox and Terbinafine       ##HIV/AIDs: Last CD4 count was 490. HIV RNA non detectable at that time. Last ID visit seems to have been on 2/11/20.   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     ##Tobacco use: Does not " smoke regularly. Declines Nicotine patch.      DISCHARGE DISPOSITION:   Discharged to sober house. The patient was discharged in a stable condition and agreed to discharge plan.    DISCHARGE INSTRUCTIONS AND FOLLOW-UP:  No discharge procedures on file.   Attestation:   I have reviewed today's vital signs, notes, medications, labs and imaging.

## 2020-02-25 NOTE — PHARMACY-VANCOMYCIN DOSING SERVICE
Pharmacy Vancomycin Initial Note  Date of Service 2020  Patient's  1967  52 year old, male    Indication: Abscess    Current estimated CrCl = Estimated Creatinine Clearance: 112 mL/min (based on SCr of 0.75 mg/dL).    Creatinine for last 3 days  2020: 12:42 AM Creatinine 0.75 mg/dL    Recent Vancomycin Level(s) for last 3 days  No results found for requested labs within last 72 hours.      Vancomycin IV Administrations (past 72 hours)      No vancomycin orders with administrations in past 72 hours.                Nephrotoxins and other renal medications (From now, onward)    None          Contrast Orders - past 72 hours (72h ago, onward)    None                Plan:  1.  Start vancomycin  1500 mg IV q12h.   2.  Goal Trough Level: 15-20 mg/L   3.  Pharmacy will check trough levels as appropriate in 1-3 Days.    4. Serum creatinine levels will be ordered daily for the first week of therapy and at least twice weekly for subsequent weeks.    5. Mulhall method utilized to dose vancomycin therapy: Method 2    Cornelius Duckworth [facility]

## 2020-02-25 NOTE — ED NOTES
MD performed I and D of Pt's right thigh, Pt tolerated procedure well. Dressing applied, specimen sent to Lab for wound culture.

## 2020-02-25 NOTE — PROGRESS NOTES
S. Patient being admitted to obs with right leg cellulitis, see records for details,    O. On exam he has tender, red, fluctuant area approximately 2.5 cm in diameter in center of cellulitis with small scab from previous I and D    A. Probable abscess or infected cyst,    P. I and D performed    1% lido with epi after wound prepped    I and D approximately 2cc of sebaceous/ purulent material drained, culture sent.    Wound irrigated, left open    Tolerated procedure well    Continue vancomycin and admit to obs.

## 2020-02-26 VITALS
OXYGEN SATURATION: 100 % | DIASTOLIC BLOOD PRESSURE: 68 MMHG | TEMPERATURE: 98 F | HEART RATE: 76 BPM | HEIGHT: 64 IN | WEIGHT: 155 LBS | RESPIRATION RATE: 16 BRPM | SYSTOLIC BLOOD PRESSURE: 99 MMHG | BODY MASS INDEX: 26.46 KG/M2

## 2020-02-26 LAB
ALBUMIN SERPL-MCNC: 2.7 G/DL (ref 3.4–5)
ALP SERPL-CCNC: 82 U/L (ref 40–150)
ALT SERPL W P-5'-P-CCNC: 25 U/L (ref 0–70)
ANION GAP SERPL CALCULATED.3IONS-SCNC: 3 MMOL/L (ref 3–14)
AST SERPL W P-5'-P-CCNC: 16 U/L (ref 0–45)
BASOPHILS # BLD AUTO: 0 10E9/L (ref 0–0.2)
BASOPHILS NFR BLD AUTO: 0.6 %
BILIRUB SERPL-MCNC: 0.7 MG/DL (ref 0.2–1.3)
BUN SERPL-MCNC: 10 MG/DL (ref 7–30)
CALCIUM SERPL-MCNC: 8.1 MG/DL (ref 8.5–10.1)
CHLORIDE SERPL-SCNC: 114 MMOL/L (ref 94–109)
CO2 SERPL-SCNC: 26 MMOL/L (ref 20–32)
CREAT SERPL-MCNC: 0.72 MG/DL (ref 0.66–1.25)
CRP SERPL-MCNC: 7.9 MG/L (ref 0–8)
DIFFERENTIAL METHOD BLD: ABNORMAL
EOSINOPHIL # BLD AUTO: 0.3 10E9/L (ref 0–0.7)
EOSINOPHIL NFR BLD AUTO: 5 %
ERYTHROCYTE [DISTWIDTH] IN BLOOD BY AUTOMATED COUNT: 14.1 % (ref 10–15)
GFR SERPL CREATININE-BSD FRML MDRD: >90 ML/MIN/{1.73_M2}
GLUCOSE BLDC GLUCOMTR-MCNC: 120 MG/DL (ref 70–99)
GLUCOSE SERPL-MCNC: 104 MG/DL (ref 70–99)
HCT VFR BLD AUTO: 36.8 % (ref 40–53)
HGB BLD-MCNC: 12.1 G/DL (ref 13.3–17.7)
IMM GRANULOCYTES # BLD: 0.1 10E9/L (ref 0–0.4)
IMM GRANULOCYTES NFR BLD: 1.2 %
LYMPHOCYTES # BLD AUTO: 2.1 10E9/L (ref 0.8–5.3)
LYMPHOCYTES NFR BLD AUTO: 33.1 %
MCH RBC QN AUTO: 31.5 PG (ref 26.5–33)
MCHC RBC AUTO-ENTMCNC: 32.9 G/DL (ref 31.5–36.5)
MCV RBC AUTO: 96 FL (ref 78–100)
MONOCYTES # BLD AUTO: 0.4 10E9/L (ref 0–1.3)
MONOCYTES NFR BLD AUTO: 5.8 %
NEUTROPHILS # BLD AUTO: 3.5 10E9/L (ref 1.6–8.3)
NEUTROPHILS NFR BLD AUTO: 54.3 %
NRBC # BLD AUTO: 0 10*3/UL
NRBC BLD AUTO-RTO: 0 /100
PLATELET # BLD AUTO: 240 10E9/L (ref 150–450)
POTASSIUM SERPL-SCNC: 3.6 MMOL/L (ref 3.4–5.3)
PROT SERPL-MCNC: 5.5 G/DL (ref 6.8–8.8)
RBC # BLD AUTO: 3.84 10E12/L (ref 4.4–5.9)
SODIUM SERPL-SCNC: 143 MMOL/L (ref 133–144)
WBC # BLD AUTO: 6.4 10E9/L (ref 4–11)

## 2020-02-26 PROCEDURE — 36415 COLL VENOUS BLD VENIPUNCTURE: CPT | Performed by: PHYSICIAN ASSISTANT

## 2020-02-26 PROCEDURE — 25000132 ZZH RX MED GY IP 250 OP 250 PS 637: Performed by: PHYSICIAN ASSISTANT

## 2020-02-26 PROCEDURE — 96376 TX/PRO/DX INJ SAME DRUG ADON: CPT

## 2020-02-26 PROCEDURE — 99217 ZZC OBSERVATION CARE DISCHARGE: CPT | Mod: Z6 | Performed by: INTERNAL MEDICINE

## 2020-02-26 PROCEDURE — G0378 HOSPITAL OBSERVATION PER HR: HCPCS

## 2020-02-26 PROCEDURE — 96372 THER/PROPH/DIAG INJ SC/IM: CPT

## 2020-02-26 PROCEDURE — 00000146 ZZHCL STATISTIC GLUCOSE BY METER IP

## 2020-02-26 PROCEDURE — 86140 C-REACTIVE PROTEIN: CPT | Performed by: PHYSICIAN ASSISTANT

## 2020-02-26 PROCEDURE — 25000128 H RX IP 250 OP 636: Performed by: EMERGENCY MEDICINE

## 2020-02-26 PROCEDURE — 85025 COMPLETE CBC W/AUTO DIFF WBC: CPT | Performed by: PHYSICIAN ASSISTANT

## 2020-02-26 PROCEDURE — 25800030 ZZH RX IP 258 OP 636: Performed by: EMERGENCY MEDICINE

## 2020-02-26 PROCEDURE — 80053 COMPREHEN METABOLIC PANEL: CPT | Performed by: PHYSICIAN ASSISTANT

## 2020-02-26 RX ADMIN — SODIUM CHLORIDE 1000 ML: 9 INJECTION, SOLUTION INTRAVENOUS at 01:05

## 2020-02-26 RX ADMIN — ACETAMINOPHEN 1000 MG: 500 TABLET, FILM COATED ORAL at 05:16

## 2020-02-26 RX ADMIN — VANCOMYCIN HYDROCHLORIDE 1500 MG: 10 INJECTION, POWDER, LYOPHILIZED, FOR SOLUTION INTRAVENOUS at 03:37

## 2020-02-26 RX ADMIN — BICTEGRAVIR SODIUM, EMTRICITABINE, AND TENOFOVIR ALAFENAMIDE FUMARATE 1 TABLET: 50; 200; 25 TABLET ORAL at 08:06

## 2020-02-26 RX ADMIN — INSULIN GLARGINE 30 UNITS: 100 INJECTION, SOLUTION SUBCUTANEOUS at 08:06

## 2020-02-26 RX ADMIN — PIPERACILLIN AND TAZOBACTAM 3.38 G: 3; .375 INJECTION, POWDER, FOR SOLUTION INTRAVENOUS at 08:04

## 2020-02-26 RX ADMIN — TERBINAFINE HYDROCHLORIDE 250 MG: 250 TABLET ORAL at 08:06

## 2020-02-26 RX ADMIN — PIPERACILLIN AND TAZOBACTAM 3.38 G: 3; .375 INJECTION, POWDER, FOR SOLUTION INTRAVENOUS at 02:43

## 2020-02-26 ASSESSMENT — ENCOUNTER SYMPTOMS
DIETARY ISSUES: ADEQUATE INTAKE
NO PATIENT REPORTED PAIN: 1
ACTIVITY IMPAIRMENT: NORMAL

## 2020-02-26 NOTE — PLAN OF CARE
"Observation Goals:    -diagnostic tests and consults completed and resulted: not met    -vital signs normal or at patient baseline: met  -tolerating oral intake to maintain hydration: met    -adequate pain control on oral analgesics: met   -tolerating oral antibiotics or has plans for home infusion setup: not met; pt is still receiving IV vanco and zosyn   -infection is improving: in progress   -safe disposition plan has been identified: not met    -Endocrine consult completed: met    BP 99/66   Pulse 62   Temp 97.8  F (36.6  C) (Oral)   Resp 16   Ht 1.626 m (5' 4\")   Wt 70.3 kg (155 lb)   SpO2 100%   BMI 26.61 kg/m      "

## 2020-02-26 NOTE — PLAN OF CARE
"Observation Goals:    -diagnostic tests and consults completed and resulted: not met    -vital signs normal or at patient baseline: met    -tolerating oral intake to maintain hydration: met    -adequate pain control on oral analgesics: met  -tolerating oral antibiotics or has plans for home infusion setup: not met; pt receiving IV antibiotics    -infection is improving: in progress   -safe disposition plan has been identified: not met    -Endocrine consult completed: met     BP 99/66   Pulse 62   Temp 97.8  F (36.6  C) (Oral)   Resp 16   Ht 1.626 m (5' 4\")   Wt 70.3 kg (155 lb)   SpO2 100%   BMI 26.61 kg/m     "

## 2020-02-26 NOTE — PROGRESS NOTES
"Andrew Lockwood is a 52 year old male patient.  1. Cellulitis of right lower extremity    2. Hyperglycemia    3. Type 2 diabetes mellitus with hyperglycemia, without long-term current use of insulin (H)      Past Medical History:   Diagnosis Date     AIDS (H)      Allergic rhinitis due to other allergen     DNS     Chronic abdominal pain      CNS toxoplasmosis (H)      Diabetes type 2, controlled (H)      GERD (gastroesophageal reflux disease)      HIV (human immunodeficiency virus infection) (H)      Periungual wart      Sleep apnea     doesn't use CPAP     No current outpatient medications on file.     Allergies   Allergen Reactions     Metformin      Abdominal pain     Milk [Lac Bovis] Hives     Dulaglutide Rash     Active Problems:    Cellulitis    Blood pressure 99/68, pulse 76, temperature 98  F (36.7  C), temperature source Oral, resp. rate 16, height 1.626 m (5' 4\"), weight 70.3 kg (155 lb), SpO2 100 %.    Subjective:  Symptoms:  Improved.  (Right thigh cellulitis abscess).    Diet:  Adequate intake.    Activity level: Normal.    Pain:  He reports no pain.      Objective:  General Appearance:  Comfortable.    Vital signs: (most recent): Blood pressure 99/68, pulse 76, temperature 98  F (36.7  C), temperature source Oral, resp. rate 16, height 1.626 m (5' 4\"), weight 70.3 kg (155 lb), SpO2 100 %.  Vital signs are normal.    Output: Producing urine.    HEENT: Normal HEENT exam.    Lungs:  Normal effort and normal respiratory rate.  Breath sounds clear to auscultation.    Heart: Normal rate.  Regular rhythm.  S1 normal and S2 normal.    Abdomen: Abdomen is soft.  Bowel sounds are normal.   There is no abdominal tenderness.     Extremities: Normal range of motion.    Pulses: Distal pulses are intact.    Neurological: Patient is alert.    Pupils:  Pupils are equal, round, and reactive to light.    Skin:  (Right thigh wound improving)    Assessment:    Condition: In stable condition.  Improving.   (52 yom with HIV " presents with right thigh cellulitis abscess s/p I and D with improvements. Switching to po abx then plan to discharge.).       Zaire Oropeza MD, MD  2/26/2020    The pt was seen and examined by myself. The case was reviewed and the plan was discussed with the [facility].

## 2020-02-26 NOTE — PLAN OF CARE
Pt. is alert and oriented x4 ,up indep,c/o mild right thigh pain,but no medication needed,dressing is dry and clean,pt. received IV vanco,NS infusing at 125 ml's /hr.LS clear,BS+,pt. walked outside to smoke.Possible discharge tonight to [facility]ber house.

## 2020-02-26 NOTE — PLAN OF CARE
OBSERVATION GOALS:     -diagnostic tests and consults completed and resulted - NOT MET  -vital signs normal or at patient baseline - MET  -tolerating oral intake to maintain hydration - MET  -adequate pain control on oral analgesics - MET  -tolerating oral antibiotics or has plans for home infusion setup - NOT MET, still on IV antibiotics   -infection is improving - IN PROGRESS  -safe disposition plan has been identified - NOT MET  -Endocrine consult completed - MET

## 2020-02-26 NOTE — PROGRESS NOTES
DC instructions given to pt, verbalized understanding.  All belongings with pt, IV DC'd and documented.  Walked down to lobby independently to take public transportation

## 2020-02-26 NOTE — PLAN OF CARE
Observation Goals:     -diagnostic tests and consults completed and resulted: met    -vital signs normal or at patient baseline: met    -tolerating oral intake to maintain hydration: met    -adequate pain control on oral analgesics: met  -tolerating oral antibiotics or has plans for home infusion setup: met;  -infection is improving: in progress   -safe disposition plan has been identified: met    -Endocrine consult completed: met

## 2020-02-27 ENCOUNTER — TELEPHONE (OUTPATIENT)
Dept: EMERGENCY MEDICINE | Facility: CLINIC | Age: 55
End: 2020-02-27

## 2020-02-27 DIAGNOSIS — Z79.4 TYPE 2 DIABETES MELLITUS WITHOUT COMPLICATION, WITH LONG-TERM CURRENT USE OF INSULIN (H): Primary | ICD-10-CM

## 2020-02-27 DIAGNOSIS — E11.9 TYPE 2 DIABETES MELLITUS WITHOUT COMPLICATION, WITH LONG-TERM CURRENT USE OF INSULIN (H): Primary | ICD-10-CM

## 2020-02-27 LAB
BACTERIA SPEC CULT: ABNORMAL
Lab: ABNORMAL
SPECIMEN SOURCE: ABNORMAL

## 2020-02-28 ENCOUNTER — PRE VISIT (OUTPATIENT)
Dept: SURGERY | Facility: CLINIC | Age: 55
End: 2020-02-28

## 2020-02-28 ENCOUNTER — HOSPITAL ENCOUNTER (EMERGENCY)
Facility: CLINIC | Age: 55
Discharge: HOME | End: 2020-02-28
Attending: EMERGENCY MEDICINE | Admitting: EMERGENCY MEDICINE
Payer: COMMERCIAL

## 2020-02-28 ENCOUNTER — OFFICE VISIT (OUTPATIENT)
Dept: SURGERY | Facility: CLINIC | Age: 55
End: 2020-02-28
Payer: COMMERCIAL

## 2020-02-28 ENCOUNTER — ANESTHESIA EVENT (OUTPATIENT)
Dept: SURGERY | Facility: CLINIC | Age: 55
End: 2020-02-28

## 2020-02-28 VITALS
HEART RATE: 102 BPM | SYSTOLIC BLOOD PRESSURE: 155 MMHG | RESPIRATION RATE: 16 BRPM | WEIGHT: 173 LBS | HEIGHT: 64 IN | OXYGEN SATURATION: 100 % | DIASTOLIC BLOOD PRESSURE: 101 MMHG | TEMPERATURE: 98.1 F | BODY MASS INDEX: 29.53 KG/M2

## 2020-02-28 VITALS
SYSTOLIC BLOOD PRESSURE: 134 MMHG | RESPIRATION RATE: 16 BRPM | DIASTOLIC BLOOD PRESSURE: 91 MMHG | HEART RATE: 100 BPM | OXYGEN SATURATION: 98 % | WEIGHT: 173.6 LBS | HEIGHT: 64 IN | TEMPERATURE: 98.6 F | BODY MASS INDEX: 29.64 KG/M2

## 2020-02-28 DIAGNOSIS — Z51.89 WOUND CHECK, ABSCESS: ICD-10-CM

## 2020-02-28 DIAGNOSIS — K62.82 ANAL DYSPLASIA: ICD-10-CM

## 2020-02-28 DIAGNOSIS — Z01.818 PRE-OP EXAMINATION: Primary | ICD-10-CM

## 2020-02-28 PROCEDURE — 99282 EMERGENCY DEPT VISIT SF MDM: CPT | Mod: 25 | Performed by: EMERGENCY MEDICINE

## 2020-02-28 PROCEDURE — 99283 EMERGENCY DEPT VISIT LOW MDM: CPT | Mod: 25 | Performed by: EMERGENCY MEDICINE

## 2020-02-28 PROCEDURE — 10160 PNXR ASPIR ABSC HMTMA BULLA: CPT | Performed by: EMERGENCY MEDICINE

## 2020-02-28 RX ORDER — LIDOCAINE HYDROCHLORIDE 10 MG/ML
INJECTION, SOLUTION EPIDURAL; INFILTRATION; INTRACAUDAL; PERINEURAL
Status: DISCONTINUED
Start: 2020-02-28 | End: 2020-02-28 | Stop reason: HOSPADM

## 2020-02-28 ASSESSMENT — ENCOUNTER SYMPTOMS
ABDOMINAL PAIN: 0
FEVER: 0
NAUSEA: 0
FATIGUE: 0
SHORTNESS OF BREATH: 0
SEIZURES: 1
VOMITING: 0

## 2020-02-28 ASSESSMENT — MIFFLIN-ST. JEOR
SCORE: 1538.44
SCORE: 1535.72

## 2020-02-28 ASSESSMENT — LIFESTYLE VARIABLES: TOBACCO_USE: 1

## 2020-02-28 NOTE — ED TRIAGE NOTES
"Triage Assessment & Note:    BP (!) 155/101   Pulse 102   Temp 98.1  F (36.7  C) (Oral)   Resp 18   Ht 1.626 m (5' 4\")   Wt 78.5 kg (173 lb)   SpO2 100%   BMI 29.70 kg/m      Patient presents with: C/o right leg cellulitis with recent hospital stay and PO antibiotics. PT c/o pain is worse.     Home Treatments/Remedies: antibiotics     Febrile / Afebrile? Afebrile     Duration of C/o: 5 days     Mick Barrientos RN  February 28, 2020      "

## 2020-02-28 NOTE — H&P
Pre-Operative H & P     CC:  Preoperative exam to assess for increased cardiopulmonary risk while undergoing surgery and anesthesia.    Date of Encounter: 2/28/2020  Primary Care Physician:  Caitie Lamb  Reason:  Anal dysplasia    HPI  Andrew Lockwood is a 52 year old male who presents for pre-operative H & P in preparation for EXAM UNDER ANESTHESIA, ANUS HIGH RESOLUTION ANOSCOPY with POSSIBLE FULGURATION, POSSIBLE BIOPSY on 3/13/2020 with Dr. Lundberg at [facility] under MAC.  Mr. Lockwood is followed by colon and rectal surgery for a history of anal dysplasia.  The above procedure has been recommended by Dr. Lundberg for ongoing management of his anal dysplasia.      Of significance, Mr. Lockwood was seen in the emergency room on 2/25/20 for left thigh cellulitis due to an ingrown hair in his right anterior thigh.  Incision and drainage was done, treated with vancomycin and zosyn and discharged on Doxycycline.      Additional pertinent past medical history includes:  HIV, DM II poorly controlled (HgbA1C 11.6), CNS toxoplasmosis s/p surgical resection (2015), recurrent SBO and tobacco dependence.        History is obtained from the patient and electronic health record.     Past Medical History  Past Medical History:   Diagnosis Date     AIDS (H)      Allergic rhinitis due to other allergen     DNS     Anal dysplasia      Chronic abdominal pain      CNS toxoplasmosis (H)      Diabetes type 2, controlled (H)      GERD (gastroesophageal reflux disease)      HIV (human immunodeficiency virus infection) (H)      Periungual wart      Sleep apnea     doesn't use CPAP       Past Surgical History  Past Surgical History:   Procedure Laterality Date     C NONSPECIFIC PROCEDURE      right forearm fracture     COLONOSCOPY Left 1/22/2016    Procedure: COMBINED COLONOSCOPY, SINGLE OR MULTIPLE BIOPSY/POLYPECTOMY BY BIOPSY;  Surgeon: Clark Saini MD;  Location: [facility]     HC EXPLORE UNDESC TESTIS,INGUIN/SCROTAL        LAPAROSCOPIC APPENDECTOMY N/A 1/31/2018    Procedure: LAPAROSCOPIC APPENDECTOMY;  LAPAROSCOPIC APPENDECTOMY;  Surgeon: Dawn Holt MD;  Location: [facility] OR     LAPAROSCOPY DIAGNOSTIC (GENERAL) N/A 7/26/2016    Procedure: LAPAROSCOPY DIAGNOSTIC (GENERAL);  Surgeon: Susannah Arriaga MD;  Location: [facility] OR     LAPAROSCOPY DIAGNOSTIC (GENERAL) N/A 4/16/2018    Procedure: LAPAROSCOPY DIAGNOSTIC (GENERAL);  Diagnostic laparoscopy and lysis of adhesions;  Surgeon: Prince Dowling MD;  Location: [facility] OR     OPTICAL TRACKING SYSTEM CRANIOTOMY, EXCISE TUMOR, COMBINED Left 4/10/2015    Procedure: COMBINED OPTICAL TRACKING SYSTEM CRANIOTOMY, EXCISE TUMOR;  Surgeon: Mirlande Colmenares MD;  Location: [facility] OR     REPAIR GAMEKEEPER'S THUMB Right 12/2/2016    Procedure: REPAIR LIGAMENT ULNAR COLLATERAL THUMB (GAMEKEEPER'S);  Surgeon: Evin Zamorano MD;  [facility]       Hx of Blood transfusions/reactions: denies     Hx of abnormal bleeding or anti-platelet use: denies    Steroid use in the last year: denies    Personal or FH with difficulty with Anesthesia:  denies    Prior to Admission Medications  Current Outpatient Medications   Medication Sig Dispense Refill     bictegravir-emtricitabine-tenofovir (BIKTARVY) -25 MG per tablet Take 1 tablet by mouth daily 30 tablet 11     blood glucose (NO BRAND SPECIFIED) lancets standard Use to test blood sugar four times daily or as directed. 100 each 11     blood glucose (NO BRAND SPECIFIED) test strip 1 strip by In Vitro route 4 times daily (before meals and nightly) Use to test blood sugars 4 times daily or as directed 100 strip 11     blood glucose monitoring (NO BRAND SPECIFIED) meter device kit Use to test blood sugar 4 times daily or as directed. 1 kit 0     Ciclopirox 8 % KIT Externally apply 1 Application topically daily In evening, remove from nails once weekly with alcohol 34 mL 1     doxycycline hyclate (VIBRAMYCIN) 100 MG capsule Take 1 capsule  (100 mg) by mouth 2 times daily for 7 days 14 capsule 0     famotidine (PEPCID) 20 MG tablet Take 1 tablet (20 mg) by mouth 2 times daily 60 tablet 1     ibuprofen (ADVIL/MOTRIN) 200 MG tablet Take 600 mg by mouth every 4 hours as needed for mild pain       insulin aspart (NOVOLOG PEN) 100 UNIT/ML pen Inject 15 Units Subcutaneous 3 times daily (with meals) Mealtime Scale (give in addition to 15 units):  For Pre-Meal  - 164 give 1 unit.   For Pre-Meal  - 189 give 2 units.   For Pre-Meal  - 214 give 3 units.   For Pre-Meal  - 239 give 4 units.   For Pre-Meal  - 264 give 5 units.   For Pre-Meal  - 289 give 6 units.   For Pre-Meal  - 314 give 7 units.   For Pre-Meal  - 339 give 8 units.   For Pre-Meal BG greater than 340 give 9 units.      Bedtime Scale:  For  - 224 give 1 unit.   For  - 249 give 2 units.   For  - 274 give 3 units.   For  - 299 give 4 units.   For  - 324 give 5 units.   For  - 349 give 6 units.   For BG greater than 350 give 7 units. 9 mL 0     insulin glargine (LANTUS PEN) 100 UNIT/ML pen Inject 30 Units Subcutaneous 2 times daily 15 mL 3     insulin pen needle (B-D U/F) 31G X 8 MM miscellaneous Use 1 pen needles daily or as directed. 100 each 3     insulin pen needle (BD PEN NEEDLE SCOTT 2ND GEN) 32G X 4 MM miscellaneous Use 3-4 daily. 250 each 3     nicotine 21 MG/24HR TD 24 hr patch Place 1 patch onto the skin every 24 hours 28 patch 0     Nutritional Supplements (GLUCOSE MANAGEMENT) TABS Use 1-2 tabs for hypoglycemia. 30 tablet 3     polyethylene glycol (MIRALAX) powder Take 17 g (1 capful) by mouth daily as needed for constipation 527 g 0     Tavaborole 5 % EX topical solution Apply 1 mL topically daily 10 mL 2     terbinafine 250 MG PO tablet Take 1 tablet (250 mg) by mouth daily 90 tablet 0     ondansetron (ZOFRAN-ODT) 4 MG ODT tab Take 1 tablet (4 mg) by mouth every 6 hours as needed for nausea or vomiting 12  tablet 0       Allergies  Allergies   Allergen Reactions     Metformin      Abdominal pain     Milk [Lac Bovis] Hives     Dulaglutide Rash       Social History  Social History     Socioeconomic History     Marital status: Single     Spouse name: Not on file     Number of children: Not on file     Years of education: Not on file     Highest education level: Not on file   Occupational History     Occupation:      Comment: 5 years; temp agency     Occupation: Unemployed   Social Needs     Financial resource strain: Not on file     Food insecurity:     Worry: Not on file     Inability: Not on file     Transportation needs:     Medical: Not on file     Non-medical: Not on file   Tobacco Use     Smoking status: Current Some Day Smoker     Packs/day: 0.25     Years: 40.00     Pack years: 10.00     Types: Cigarettes     Last attempt to quit: 10/28/2016     Years since quitting: 3.3     Smokeless tobacco: Former User   Substance and Sexual Activity     Alcohol use: No     Alcohol/week: 0.0 standard drinks     Comment: Last etoh in 2007     Drug use: Not Currently     Types: Marijuana, Methamphetamines     Comment: Sober 59 days on 2/28/2020     Sexual activity: Not Currently     Partners: Female, Male     Comment: Last sexual activity 2008   Lifestyle     Physical activity:     Days per week: Not on file     Minutes per session: Not on file     Stress: Not on file   Relationships     Social connections:     Talks on phone: Not on file     Gets together: Not on file     Attends Judaism service: Not on file     Active member of club or organization: Not on file     Attends meetings of clubs or organizations: Not on file     Relationship status: Not on file     Intimate partner violence:     Fear of current or ex partner: Not on file     Emotionally abused: Not on file     Physically abused: Not on file     Forced sexual activity: Not on file   Other Topics Concern     Parent/sibling w/ CABG, MI or angioplasty  before 65F 55M? Not Asked   Social History Narrative    Born in [facility] [place] by [facility].  Came to the [place] in 1993.  Last traveled to visit family in 2008.        1/7/2019 - Homeless. Working with a  at [employer]  trying to get housing. Sexually active with two partners recently using condoms 100% of the time. Smokes occasionally, not for the last 4 weeks.        1/7/2020 at [facility] since 1/1/2020. 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  Care established with ID and endocrine       Family History  Family History   Problem Relation Age of Onset     Diabetes Brother      Diabetes Father      Alzheimer Disease Father      Unknown/Adopted Mother      Diabetes Paternal Grandfather      Cancer No family hx of         no skin cancer     Skin Cancer No family hx of         no famiy hx of skin cancer     Glaucoma No family hx of      Macular Degeneration No family hx of          ROS/MED HX    ENT/Pulmonary:     (+)sleep apnea (Currently homeless and does not have his CPAP. ), tobacco use (Smokes 3 cigarettes per day for past 40 years (started when he was 10 years old.), Current use doesn't use CPAP , recent URI resolved September 2019: . .    Neurologic: Comment: H/o toxoplasmosis CNS s/p surgical resection, 2015-16.    (+)seizures last seizure: October 2019    CVA: Blood glucose 751.   Cardiovascular: Comment: Holter monitor for sinus tachycardia and palpitations in 2018. Sinus rhythm  bpm with average rate of 104 bpm. No ectopy.     (+) ----. : . . . :. . Previous cardiac testing date:results:date: results:ECG reviewed date:1/1/2020 results:Sinus tachycardia 104 bpm  Otherwise normal ECG date: results:          METS/Exercise Tolerance:  >4 METS   Hematologic:  - neg hematologic  ROS       Musculoskeletal:   (+) fracture upper extremity (Fracture at [age] years old s/p hardware. ): Radius,  -       GI/Hepatic:     (+) GERD Asymptomatic on medication, appendicitis (s/p appendectomy),       Renal/Genitourinary: Comment: Penile  "implant, 2015        Endo:     (+) type II DM (9.0) Last HgA1c: 11.6 date: 2/2020 Using insulin - not using insulin pump Normal glucose range: BG in morning 150's Previously admitted for DM/DKA .      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     (+) psychiatric history other (comment) (PTSD)      Infectious Disease: Comment: Left thigh cellulitis on antibiotics.   (+) HIV,       Malignancy:      - no malignancy   Other:    (+) No chance of pregnancy C-spine cleared: Yes, H/O Chronic Pain,no other significant disability                        PHYSICAL EXAM:   Mental Status/Neuro: A/A/O   Airway: Facies: Feasible  Mallampati: II  Mouth/Opening: Full  TM distance: > 6 cm  Neck ROM: Full   Respiratory: Auscultation: CTAB     Resp. Rate: Normal     Resp. Effort: Normal      CV: Rhythm: Regular  Rate: Tachy  Heart: Normal Sounds  Edema: None   Comments: Missing some molars.          Temp: 98.6  F (37  C) Temp src: Oral BP: (!) 134/91 Pulse: 100   Resp: 16 SpO2: 98 %         173 lbs 9.6 oz  5' 4\"   Body mass index is 29.8 kg/m .       Physical Exam  Constitutional: Awake, alert, cooperative, no apparent distress, hyperactive and appears stated age.  Eyes: Pupils equal, round and reactive to light, extra ocular muscles intact, sclera clear, conjunctiva normal.  HENT: Normocephalic, oral pharynx with moist mucus membranes, missing some molars with remaining dentition in fair repair. No goiter appreciated.   Respiratory: Clear to auscultation bilaterally, no crackles or wheezing.  Cardiovascular: Regular rate and rhythm, normal S1 and S2, and no murmur noted.  Carotids +2, no bruits. No edema. Palpable pulses to radial  DP and PT arteries.   GI: Normal bowel sounds, soft, non-distended, non-tender, no masses palpated, no hepatosplenomegaly.   Lymph/Hematologic: No cervical lymphadenopathy and no supraclavicular lymphadenopathy.  Genitourinary:  deferred  Skin: Warm and dry.  "   Musculoskeletal: Full ROM of neck. There is no redness, warmth, or swelling of the joints. Gross motor strength is normal.    Neurologic: Awake, alert, oriented to name, place and time. Cranial nerves II-XII are grossly intact. Gait is normal.   Neuropsychiatric: Calm, cooperative. Normal affect.     Labs: (personally reviewed)  Lab Results   Component Value Date    WBC 6.4 02/26/2020     Lab Results   Component Value Date    RBC 3.84 02/26/2020     Lab Results   Component Value Date    HGB 12.1 02/26/2020     Lab Results   Component Value Date    HCT 36.8 02/26/2020     Lab Results   Component Value Date    MCV 96 02/26/2020     Lab Results   Component Value Date    MCH 31.5 02/26/2020     Lab Results   Component Value Date    MCHC 32.9 02/26/2020     Lab Results   Component Value Date    RDW 14.1 02/26/2020     Lab Results   Component Value Date     02/26/2020       Last Comprehensive Metabolic Panel:  Sodium   Date Value Ref Range Status   02/26/2020 143 133 - 144 mmol/L Final     Potassium   Date Value Ref Range Status   02/26/2020 3.6 3.4 - 5.3 mmol/L Final     Chloride   Date Value Ref Range Status   02/26/2020 114 (H) 94 - 109 mmol/L Final     Carbon Dioxide   Date Value Ref Range Status   02/26/2020 26 20 - 32 mmol/L Final     Anion Gap   Date Value Ref Range Status   02/26/2020 3 3 - 14 mmol/L Final     Glucose   Date Value Ref Range Status   02/26/2020 104 (H) 70 - 99 mg/dL Final     Urea Nitrogen   Date Value Ref Range Status   02/26/2020 10 7 - 30 mg/dL Final     Creatinine   Date Value Ref Range Status   02/26/2020 0.72 0.66 - 1.25 mg/dL Final     GFR Estimate   Date Value Ref Range Status   02/26/2020 >90 >60 mL/min/[1.73_m2] Final     Comment:     Non  GFR Calc  Starting 12/18/2018, serum creatinine based estimated GFR (eGFR) will be   calculated using the Chronic Kidney Disease Epidemiology Collaboration   (CKD-EPI) equation.       Calcium   Date Value Ref Range Status    02/26/2020 8.1 (L) 8.5 - 10.1 mg/dL Final     Labs:  2/11/20  5:11 PM [id number]   Component Value Flag Ref Range Units Status Collected Lab  IFC Specimen Blood     Final 02/11/2020  5:11 PM 51  CD3 Mature T 57   49 - 84 % Final 02/11/2020  5:11 PM 51  CD4 Marion T 17  Low   28 - 63 % Final 02/11/2020  5:11 PM 51  CD8 Suppressor T 37   10 - 40 % Final 02/11/2020  5:11 PM 51  CD4:CD8 Ratio 0.46  Low   1.40 - 2.60  Final 02/11/2020  5:11 PM 51  Absolute CD3 1,598   603 - 2,990 cells/uL Final 02/11/2020  5:11 PM 51  Absolute CD4 490   441 - 2,156 cells/uL Final 02/11/2020  5:11 PM 51  Absolute CD8 1,036   125 - 1,312 cells/uL Final 02/11/2020  5:11 PM 51        PROCEDURES  Holter monitor 2018    Outside records reviewed from: Care Everywhere    ASSESSMENT and PLAN  Andrew Lockwood is a 52 year old male scheduled to undergo EXAM UNDER ANESTHESIA, ANUS HIGH RESOLUTION ANOSCOPY with POSSIBLE FULGURATION, POSSIBLE BIOPSY on 3/13/2020 with Dr. Lundberg at [facility] under MAC.    He has the following specific operative considerations:   - Untreated DANISHA  - VTE risk:  0.5%  - Risk of PONV score = 0.     #  Cardiology - Denies known coronary artery disease or cardiac symptoms.  METS:  >4. RCRI : No serious cardiac risks.  0.4 % risk of major adverse cardiac event.   #  Pulmonary - tobacco dependence, smoked for 40 years 0.5-1.0 PPD. Encouraged smoking cessation.  Encourage coughing/deep breathing.  Consider use of bronchodilators to optimize pulmonary function in the perioperative period.    #  GI - GERD: Patient instructed to take H2B as prescribed.  Consider use of RSI techniques with advanced airway maneuvers.   #  Endocrine - Diabetes,uncontrolled,insulin dependent followed by endocrine. Hold morning oral hypoglycemic medications and short acting insulin DOS. Take 80% of last scheduled dose of long acting insulin prior to procedure.  Recommend close monitoring of the patient's blood glucose levels throughout the perioperative  period and treat per Aragon guidelines. HgbA1C 11.6.  #  ID - HIV managed with antiviral with Dr. Prieto.  #  Neuro - h/o CNS toxoplasmosis, 2016.  #  Derm - Left thigh cellulitis, continues on doxycycline.  Concerned about a second in-grown hair in same area.  Instructed to be seen for further evaluation before above surgery.    #  [place]-rectal - anal dysplasia with above procedure planned. Reports that he is homeless staying at a sober house.  Given this, he does not have anyone to take care of him post op. Notified Dr. Lundberg's office.    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  Will check drug toxicology screen as hyperactive during visit.     - Anesthesia considerations:  Anesthesia record:  04/10/15; 1014; Mask Ventilation: Not attempted (RSI); Airway Size: 8;  Cuffed;  Oral;  Blade Type: C-Mac;  Blade Size: 4;  Place by: medgar crna;  Insertion Attempts: 2;  Secured at (cm)to lip: 24 cm;  Breath Sounds: Equal, clear and bilateral;  End Tidal CO2: Present;  Dentition: Intact;  Airway Adjuncts:  C-Mac (Lindo 2 X 1 no  view, grade I view     - Refer to PAC assessment in anesthesia records      Arrival time, NPO, shower and medication instructions provided by nursing staff today.  Preparing For Your Surgery handout given.        VIKASH Chan CNP  Preoperative Assessment Center  [facility]  Phone: [phone]  Fax: [phone]

## 2020-02-28 NOTE — PATIENT INSTRUCTIONS
Preparing for Your Surgery      Name:  Andrew Lockwood   MRN:  [id number]   :  1967   Today's Date:  2020     Arriving for surgery:  Surgery date:  3/13/20  Arrival time:  1:45 pm    Please come to:     [facility]  [address] [place] [id number]     Parking is available in front of the Clinics and Surgery Center building from 5:30AM to 8:00PM.  -  Proceed to the 5th floor to check into the Ambulatory Surgery Center.              >> There will be patient concierges on the 1st and 5th floor, for assistance or an escort, if you would like.              >> Please call [phone] with any questions.    -  Bring your ID and insurance card.    - If you are scheduled to go home the Same Day as surgery you must have a responsible adult as a  and to stay with you overnight the first 24 hours after surgery.     What can I eat or drink?  -  You may have solid food or milk products until 8 hours prior to your surgery. (Until 7:15 am )  -  You may have water, apple juice or 7up/Sprite until 2 hours prior to your surgery. (Until 1:15 pm )    Which medicines can I take?       -  Do not bring your own medications to the hospital.        -  Follow [facility] instructions regarding Ibuprofen. If no instructions given, NO Ibuprofen the day prior to surgery.            -   Hold Aspirin, Aspirin products, Multivitamins and supplements one week prior to surgery.                       -  Hold Naproxen (Aleve) 4 days prior to surgery.    -  Do NOT take these medications in the morning, the day of surgery:  Aspart insulin (Novolog) insulin, Miralax, Tavaborole topical solution,     -  Please take these medications the morning of surgery:  Biktarvy, Famotidine (Pepcid), Nicotine patch, Terbinafine,   Acetaminophen (Tylenol) if needed  Ondansetron (Zofran) if needed    -  The morning of surgery, decrease Glargine (Lantus) insulin to 24 units.     How do I prepare  myself?  -  Do 2 Fleets enemas the morning of surgery.  -  Take two showers: one the night before surgery; and one the morning of surgery.         Use Scrubcare or Hibiclens to wash from neck down.  You may use your own shampoo and conditioner. No other hair products.   -  Do NOT use lotion, powder, colognes, deodorant, or antiperspirant the day of your surgery.  -  Do NOT wear any jewelry.    Questions or Concerns:  If your surgery is at the Ambulatory Surgery Center, please call [phone]. (Mon - Fri 8 am- 3:30 pm)  Questions about surgery, contact your Surgeons office.  If you have any health changes prior to surgery, please notify your Surgeon.    AFTER YOUR SURGERY  Breathing exercises   Breathing exercises help you recover faster. Take deep breaths and let the air out slowly. This will:     Help you wake up after surgery.    Help prevent complications like pneumonia.  Preventing complications will help you go home sooner.   Nausea and vomiting   You may feel sick to your stomach after surgery; if so, let your nurse know.    Pain control:  After surgery, you may have pain. Our goal is to help you manage your pain. Pain medicine will help you feel comfortable enough to do activities that will help you heal.  These activities may include breathing exercises, walking and physical therapy.   To help your health care team treat your pain we will ask: 1) If you have pain  2) where it is located 3) describe your pain in your words  Methods of pain control include medications given by mouth, vein or by nerve block for some surgeries.  Sequential Compression Device (SCD) or Pneumo Boots:  You may need to wear SCD S on your legs or feet. These are wraps connected to a machine that pumps in air and releases it. The repeated pumping helps prevent blood clots from forming.

## 2020-02-28 NOTE — ED AVS SNAPSHOT
[facility], [place], Emergency Department  [address] [place] [id number]  Phone:  [phone]                                    Andrew Lockwood   MRN: [id number]    Department:  [facility], Emergency Department   Date of Visit:  2/28/2020           After Visit Summary Signature Page    I have received my discharge instructions, and my questions have been answered. I have discussed any challenges I see with this plan with the nurse or doctor.    ..........................................................................................................................................  Patient/Patient Representative Signature      ..........................................................................................................................................  Patient Representative Print Name and Relationship to Patient    ..................................................               ................................................  Date                                   Time    ..........................................................................................................................................  Reviewed by Signature/Title    ...................................................              ..............................................  Date                                               Time          22EPIC Rev 08/18

## 2020-02-28 NOTE — ANESTHESIA PREPROCEDURE EVALUATION
Anesthesia Pre-Procedure Evaluation    Patient: Andrew Lockwood   MRN:     [id number] Gender:   male   Age:    52 year old :      1967        Preoperative Diagnosis: * No surgery found *        LABS:  CBC:   Lab Results   Component Value Date    WBC 6.4 2020    WBC 7.1 2020    HGB 12.1 (L) 2020    HGB 12.4 (L) 2020    HCT 36.8 (L) 2020    HCT 37.5 (L) 2020     2020     2020     BMP:   Lab Results   Component Value Date     2020     2020    POTASSIUM 3.6 2020    POTASSIUM 3.8 2020    CHLORIDE 114 (H) 2020    CHLORIDE 108 2020    CO2 26 2020    CO2 23 2020    BUN 10 2020    BUN 13 2020    CR 0.72 2020    CR 0.62 (L) 2020     (H) 2020     (H) 2020     COAGS:   Lab Results   Component Value Date    PTT 35 10/11/2019    INR 1.04 2020     POC:   Lab Results   Component Value Date     (H) 2020     OTHER:   Lab Results   Component Value Date    PH 7.42 04/10/2015    LACT 2.0 2020    A1C 11.6 (H) 2020    LINDA 8.1 (L) 2020    PHOS 3.0 2020    MAG 1.9 2020    ALBUMIN 2.7 (L) 2020    PROTTOTAL 5.5 (L) 2020    ALT 25 2020    AST 16 2020    ALKPHOS 82 2020    BILITOTAL 0.7 2020    LIPASE 147 2020    AMYLASE 75 2018    TSH 2.66 2020    T4 1.02 2015    CRP 7.9 2020    SED 21 (H) 2020        Preop Vitals    BP Readings from Last 3 Encounters:   20 99/68   20 111/82   20 132/86    Pulse Readings from Last 3 Encounters:   20 76   20 97   20 80      Resp Readings from Last 3 Encounters:   20 16   20 18   20 18    SpO2 Readings from Last 3 Encounters:   20 100%   20 97%   20 100%      Temp Readings from Last 1 Encounters:   20 98  F (36.7  C) (Oral)    Ht Readings  "from Last 1 Encounters:   02/25/20 1.626 m (5' 4\")      Wt Readings from Last 1 Encounters:   02/25/20 70.3 kg (155 lb)    Estimated body mass index is 26.61 kg/m  as calculated from the following:    Height as of 2/25/20: 1.626 m (5' 4\").    Weight as of 2/25/20: 70.3 kg (155 lb).     LDA:        Past Medical History:   Diagnosis Date     AIDS (H)      Allergic rhinitis due to other allergen     DNS     Chronic abdominal pain      CNS toxoplasmosis (H)      Diabetes type 2, controlled (H)      GERD (gastroesophageal reflux disease)      HIV (human immunodeficiency virus infection) (H)      Periungual wart      Sleep apnea     doesn't use CPAP      Past Surgical History:   Procedure Laterality Date     C NONSPECIFIC PROCEDURE      right forearm fracture     COLONOSCOPY Left 1/22/2016    Procedure: COMBINED COLONOSCOPY, SINGLE OR MULTIPLE BIOPSY/POLYPECTOMY BY BIOPSY;  Surgeon: Clark Saini MD;  Location: [facility] GI     HC EXPLORE UNDESC TESTIS,INGUIN/SCROTAL       LAPAROSCOPIC APPENDECTOMY N/A 1/31/2018    Procedure: LAPAROSCOPIC APPENDECTOMY;  LAPAROSCOPIC APPENDECTOMY;  Surgeon: Dawn Holt MD;  Location: [facility] OR     LAPAROSCOPY DIAGNOSTIC (GENERAL) N/A 7/26/2016    Procedure: LAPAROSCOPY DIAGNOSTIC (GENERAL);  Surgeon: Susannah Arriaga MD;  Location: [facility] OR     LAPAROSCOPY DIAGNOSTIC (GENERAL) N/A 4/16/2018    Procedure: LAPAROSCOPY DIAGNOSTIC (GENERAL);  Diagnostic laparoscopy and lysis of adhesions;  Surgeon: Prince Dowling MD;  Location: [facility] OR     OPTICAL TRACKING SYSTEM CRANIOTOMY, EXCISE TUMOR, COMBINED Left 4/10/2015    Procedure: COMBINED OPTICAL TRACKING SYSTEM CRANIOTOMY, EXCISE TUMOR;  Surgeon: Mirlande Colmenares MD;  Location: [facility] OR     REPAIR GAMEKEEPER'S THUMB Right 12/2/2016    Procedure: REPAIR LIGAMENT ULNAR COLLATERAL THUMB (GAMEKEEPER'S);  Surgeon: Evin Zamorano MD;  Location: [facility] OR      Allergies   Allergen Reactions     Metformin      Abdominal pain     " Milk [Lac Bovis] Hives     Dulaglutide Rash        Anesthesia Evaluation     . Pt has had prior anesthetic. Type: General and MAC    No history of anesthetic complications          ROS/MED HX    ENT/Pulmonary:     (+)sleep apnea (Currently homeless and does not have his CPAP. ), tobacco use (Smokes 3 cigarettes per day for past 40 years (started when he was 10 years old.), Current use doesn't use CPAP , recent URI resolved September 2019: . .    Neurologic: Comment: H/o toxoplasmosis CNS s/p surgical resection, 2015-16.    (+)seizures last seizure: October 2019    CVA: Blood glucose 751.   Cardiovascular: Comment: Holter monitor for sinus tachycardia and palpitations in 2018. Sinus rhythm  bpm with average rate of 104 bpm. No ectopy.     (+) ----. : . . . :. . Previous cardiac testing date:results:date: results:ECG reviewed date:1/1/2020 results:Sinus tachycardia 104 bpm  Otherwise normal ECG date: results:          METS/Exercise Tolerance:  >4 METS   Hematologic:  - neg hematologic  ROS       Musculoskeletal:   (+) fracture upper extremity (Fracture at [age] years old s/p hardware. ): Radius,  -       GI/Hepatic:     (+) GERD Asymptomatic on medication, appendicitis (s/p appendectomy),       Renal/Genitourinary: Comment: Penile implant, 2015        Endo:     (+) type II DM (9.0) Last HgA1c: 11.6 date: 2/2020 Using insulin - not using insulin pump Normal glucose range: BG in morning 150's Previously admitted for DM/DKA .      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     (+) psychiatric history other (comment) (PTSD)      Infectious Disease: Comment: Left thigh cellulitis on antibiotics.   (+) HIV,       Malignancy:      - no malignancy   Other:    (+) No chance of pregnancy C-spine cleared: Yes, H/O Chronic Pain,no other significant disability                        PHYSICAL EXAM:   Mental Status/Neuro: A/A/O   Airway: Facies: Feasible  Mallampati: II  Mouth/Opening:  Full  TM distance: > 6 cm  Neck ROM: Full   Respiratory: Auscultation: CTAB     Resp. Rate: Normal     Resp. Effort: Normal      CV: Rhythm: Regular  Rate: Tachy  Heart: Normal Sounds  Edema: None   Comments: Missing some molars.      Dental: Normal Dentition                JZG FV AN PLAN NO PONV RULE       PAC Discussion and Assessment    ASA Classification: 3  Case is suitable for:   Anesthetic techniques and relevant risks discussed: GA  Invasive monitoring and risk discussed:   Types:   Possibility and Risk of blood transfusion discussed:   NPO instructions given:   Additional anesthetic preparation and risks discussed:   Needs early admission to pre-op area:   Other:     PAC Resident/NP Anesthesia Assessment:  Andrew Lockwood is a 52-year old male scheduled for EXAM UNDER ANESTHESIA, ANUS HIGH RESOLUTION ANOSCOPY with POSSIBLE FULGURATION, POSSIBLE BIOPSY on 3/13/2020 with Dr. Lundberg at [facility] under MAC.  Mr. Lockwood is followed by colon and rectal surgery for a history of anal dysplasia.  The above procedure has been recommended by Dr. Lundberg for ongoing management of his anal dysplasia.      Of significance, Mr. Lockwood was seen in the emergency room on 2/25/20 for left thigh cellulitis due to an ingrown hair in his right anterior thigh.  Incision and drainage was done, treated with vancomycin and zosyn and discharged on Doxycycline.      Additional pertinent past medical history includes:  HIV, DM II poorly controlled (HgbA1C 11.6), CNS toxoplasmosis s/p surgical resection (2015), recurrent SBO and tobacco dependence.       Labs:  2/11/20  5:11 PM [id number]   Component Value Flag Ref Range Units Status Collected Lab  IFC Specimen Blood     Final 02/11/2020  5:11 PM 51  CD3 Mature T 57   49 - 84 % Final 02/11/2020  5:11 PM 51  CD4 Bells T 17  Low   28 - 63 % Final 02/11/2020  5:11 PM 51  CD8 Suppressor T 37   10 - 40 % Final 02/11/2020  5:11 PM 51  CD4:CD8 Ratio 0.46  Low   1.40 - 2.60  Final 02/11/2020  5:11  PM 51  Absolute CD3 1,598   603 - 2,990 cells/uL Final 02/11/2020  5:11 PM 51  Absolute CD4 490   441 - 2,156 cells/uL Final 02/11/2020  5:11 PM 51  Absolute CD8 1,036   125 - 1,312 cells/uL Final 02/11/2020  5:11 PM 51    He has the following specific operative considerations:   - Untreated DANISHA  - VTE risk:  0.5%  - Risk of PONV score = 0.     #  Cardiology - Denies known coronary artery disease or cardiac symptoms.  METS:  >4. RCRI : No serious cardiac risks.  0.4 % risk of major adverse cardiac event.   #  Pulmonary - tobacco dependence, smoked for 40 years 0.5-1.0 PPD. Encouraged smoking cessation.  Encourage coughing/deep breathing.  Consider use of bronchodilators to optimize pulmonary function in the perioperative period.    #  GI - GERD: Patient instructed to take H2B as prescribed.  Consider use of RSI techniques with advanced airway maneuvers.   #  Endocrine - Diabetes,uncontrolled,  insulin dependent followed by endocrine: Hold morning oral hypoglycemic medications and short acting insulin DOS. Take 80% of last scheduled dose of long acting insulin prior to procedure.  Recommend close monitoring of the patient's blood glucose levels throughout the perioperative period and treat per [place] guidelines. HgbA1C 11.6.  #  ID - HIV managed with antiviral with Dr. Prieto.  #  Neuro - h/o CNS toxoplasmosis s/p resection in 2013.  #  Derm - Left thigh cellulitis, continues on doxycycline.  Concerned about a second in-grown hair in same area.  Instructed to be seen for further evaluation before above surgery.    #  Brady-rectal - anal dysplasia with above procedure planned. Reports that he is homeless staying at a sober house.  Given this, he does not have anyone to take care of him post op. Notified Dr. Lundberg's office.    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  Will check drug toxicology screen as hyperactive during visit.     - Anesthesia considerations:  Anesthesia record:  04/10/15; 1014;  Mask Ventilation: Not attempted (RSI); Airway Size: 8;  Cuffed;  Oral;  Blade Type: C-Mac;  Blade Size: 4;  [facility];  Insertion Attempts: 2;  Secured at (cm)to lip: 24 cm;  Breath Sounds: Equal, clear and bilateral;  End Tidal CO2: Present;  Dentition: Intact;  Airway Adjuncts:  C-Mac (Lindo 2 X 1 no  view, grade I view     - Refer to PAC assessment in anesthesia records      Arrival time, NPO, shower and medication instructions provided by nursing staff today.  Preparing For Your Surgery handout given.        Reviewed and Signed by PAC Mid-Level Provider/Resident  Mid-Level Provider/Resident: Renee SUH CNP  Date: 2/28/2020  Time: 15:19    Attending Anesthesiologist Anesthesia Assessment:        Anesthesiologist:   Date:   Time:   Pass/Fail:   Disposition:     PAC Pharmacist Assessment:        Pharmacist:   Date:   Time:    Jax Antunez MD     Holter monitor 2018

## 2020-02-28 NOTE — DISCHARGE INSTRUCTIONS
Your Abscess does not look like its re-infected.     Take your antibiotics as previously prescribed.     Follow up with your primary care doctor for further care.

## 2020-02-29 ENCOUNTER — CARE COORDINATION (OUTPATIENT)
Dept: SURGERY | Facility: CLINIC | Age: 55
End: 2020-02-29

## 2020-02-29 NOTE — ED PROVIDER NOTES
"      [facility] EMERGENCY DEPARTMENT ([facility])  February 28, 2020    History     Chief Complaint   Patient presents with     Wound Check     The history is provided by the patient and medical records.     Andrew Lockwood is a 52 year old male who with a history of HIV as well as a history of recent admission to the hospital for an abscess who presents back to the ER due to wanting a wound check of his abscess.  Patient states that he has been taking the oral doxycycline with a little increased pain around the abscess that was drained.  Patient denies any fevers.  Patient denies any body aches.  Patient says that he is living in a sober house and they are giving him the appropriate insulin.  Patient denies any weakness.  Patient denies any elevation of sugars at home.  Patient with no other symptoms.      This part of the medical record was transcribed by Katalina Hebert, Medical Scribe, from a dictation done by Analilia Galeas MD.       I have reviewed the Medications, Allergies, Past Medical and Surgical History, and Social History in the Epic system.    Review of Systems   Constitutional: Negative for fatigue and fever.   Respiratory: Negative for shortness of breath.    Gastrointestinal: Negative for abdominal pain, nausea and vomiting.   All other systems reviewed and are negative.      Physical Exam   BP: (!) 155/101  Pulse: 102  Heart Rate: 102  Temp: 98.1  F (36.7  C)  Resp: 18  Height: 162.6 cm (5' 4\")  Weight: 78.5 kg (173 lb)  SpO2: 100 %      Physical Exam  Constitutional:       Appearance: He is well-developed.   HENT:      Head: Normocephalic and atraumatic.   Neck:      Musculoskeletal: Normal range of motion and neck supple.   Cardiovascular:      Rate and Rhythm: Normal rate and regular rhythm.      Heart sounds: Normal heart sounds.   Pulmonary:      Effort: Pulmonary effort is normal. No respiratory distress.      Breath sounds: No wheezing.   Abdominal:      General: There is no " distension.      Palpations: Abdomen is soft.      Tenderness: There is no abdominal tenderness. There is no rebound.   Skin:     General: Skin is warm.          Neurological:      Mental Status: He is alert and oriented to person, place, and time.   Psychiatric:         Behavior: Behavior normal.         Thought Content: Thought content normal.         ED Course        Procedures              Needle aspiration of right leg:  Used 3 ml of lidocaine to infiltrate abscess; used larger needed to aspirate without any purulent drainage  Tolerated well        No results found for any visits on 02/28/20.  Medications - No data to display         Labs Ordered and Resulted from Time of ED Arrival Up to the Time of Departure from the ED - No data to display         Assessments & Plan (with Medical Decision Making)  Patient had a small resolving abscess on his right side.  I did do a needle aspiration was unable to get any new pus.  Patient has no surrounding erythema and no surrounding concerns of worsening infection.  Patient encouraged to continue taking the antibiotic as previously prescribed a few days prior.  Patient is stable for discharge.  This part of the medical record was transcribed by Katalina Hebert Medical Scribe, from a dictation done by Analilia Galeas MD.        I have reviewed the nursing notes.    I have reviewed the findings, diagnosis, plan and need for follow up with the patient.    Discharge Medication List as of 2/28/2020  5:08 PM          Final diagnoses:   Wound check, abscess   Katalina KELLY, am serving as a trained medical scribe to document services personally performed by , MD, based on the provider's statements to me.     KELLY MD, was physically present and have reviewed and verified the accuracy of this note documented by Katalina Hebert.      2/28/2020   [facility], EMERGENCY DEPARTMENT     Analilia Galeas MD  02/28/20 1956

## 2020-03-02 ENCOUNTER — TELEPHONE (OUTPATIENT)
Dept: SURGERY | Facility: CLINIC | Age: 55
End: 2020-03-02

## 2020-03-02 NOTE — TELEPHONE ENCOUNTER
Carrie Jain, JASON, came to talk to me while in clinic on 3/2/2020. The conversation resulted in the following transcript of an in basket message to patient's SW Simon Pickenstroy, our onsite SW Kate Arguello, and a CC to JASON Butler:    Hi Kate Montes,    Patient told the PAC clinic that he has nobody to stay with him for 24 hours after his procedure on 3/13/2020?? If that is the case, we will need to cancel his procedure again.    It was my understanding from our conversation that I should just schedule his procedure whenever - and we also discussed dates - and that you would arrange for someone to stay with Andrew for 24 hours afterward.     I am hoping that what Andrew said in PAC was just a misunderstanding? Please advise.    Thank-you!    Radha Dempsey  Matilda-op Coordinator  [facility]  [phone]

## 2020-03-04 ENCOUNTER — DOCUMENTATION ONLY (OUTPATIENT)
Dept: SURGERY | Facility: CLINIC | Age: 55
End: 2020-03-04

## 2020-03-04 NOTE — PROGRESS NOTES
Patient is scheduled for surgery with Dr. Lundberg    Spoke with Andrew when he was in clinic    Date of Surgery: 3/13/2020    Location: [facility]    Informed patient they will need an adult  YES    H&P: Scheduled with PAC on 2/28/2020 at 1:30 pm    Labs were added on by PAC on 2/28/2020 at 3:15 pm    Surgery packet: saved to Chart; patient is homeless with no address and no MyChart; he does have email, and requested that it be sent there, which was done on 2/24/2020     Additional comments: waiting to hear back from DAYNA Cadena and DAYNA Arguello as to whether they found someone to drive patient after surgery, and to monitor him for 24 hours afterwards

## 2020-03-06 ENCOUNTER — HOSPITAL ENCOUNTER (EMERGENCY)
Facility: CLINIC | Age: 55
Discharge: HOME | End: 2020-03-06
Attending: EMERGENCY MEDICINE | Admitting: EMERGENCY MEDICINE
Payer: COMMERCIAL

## 2020-03-06 VITALS
HEART RATE: 98 BPM | BODY MASS INDEX: 30.22 KG/M2 | SYSTOLIC BLOOD PRESSURE: 117 MMHG | WEIGHT: 177 LBS | OXYGEN SATURATION: 100 % | HEIGHT: 64 IN | RESPIRATION RATE: 16 BRPM | TEMPERATURE: 97.9 F | DIASTOLIC BLOOD PRESSURE: 93 MMHG

## 2020-03-06 DIAGNOSIS — E11.65 TYPE 2 DIABETES MELLITUS WITH HYPERGLYCEMIA, WITH LONG-TERM CURRENT USE OF INSULIN (H): ICD-10-CM

## 2020-03-06 DIAGNOSIS — Z79.4 TYPE 2 DIABETES MELLITUS WITH HYPERGLYCEMIA, WITH LONG-TERM CURRENT USE OF INSULIN (H): ICD-10-CM

## 2020-03-06 LAB
ANION GAP SERPL CALCULATED.3IONS-SCNC: 4 MMOL/L (ref 3–14)
BUN SERPL-MCNC: 12 MG/DL (ref 7–30)
CALCIUM SERPL-MCNC: 8.8 MG/DL (ref 8.5–10.1)
CHLORIDE SERPL-SCNC: 103 MMOL/L (ref 94–109)
CO2 SERPL-SCNC: 25 MMOL/L (ref 20–32)
CREAT SERPL-MCNC: 0.61 MG/DL (ref 0.66–1.25)
GFR SERPL CREATININE-BSD FRML MDRD: >90 ML/MIN/{1.73_M2}
GLUCOSE BLDC GLUCOMTR-MCNC: 279 MG/DL (ref 70–99)
GLUCOSE BLDC GLUCOMTR-MCNC: 454 MG/DL (ref 70–99)
GLUCOSE SERPL-MCNC: 445 MG/DL (ref 70–99)
POTASSIUM SERPL-SCNC: 3.7 MMOL/L (ref 3.4–5.3)
SODIUM SERPL-SCNC: 132 MMOL/L (ref 133–144)

## 2020-03-06 PROCEDURE — 80048 BASIC METABOLIC PNL TOTAL CA: CPT | Performed by: EMERGENCY MEDICINE

## 2020-03-06 PROCEDURE — 25000131 ZZH RX MED GY IP 250 OP 636 PS 637: Performed by: EMERGENCY MEDICINE

## 2020-03-06 PROCEDURE — 25000125 ZZHC RX 250: Performed by: EMERGENCY MEDICINE

## 2020-03-06 PROCEDURE — 96372 THER/PROPH/DIAG INJ SC/IM: CPT | Performed by: EMERGENCY MEDICINE

## 2020-03-06 PROCEDURE — 99284 EMERGENCY DEPT VISIT MOD MDM: CPT | Mod: Z6 | Performed by: EMERGENCY MEDICINE

## 2020-03-06 PROCEDURE — 25000132 ZZH RX MED GY IP 250 OP 250 PS 637: Performed by: EMERGENCY MEDICINE

## 2020-03-06 PROCEDURE — 99284 EMERGENCY DEPT VISIT MOD MDM: CPT | Performed by: EMERGENCY MEDICINE

## 2020-03-06 PROCEDURE — 00000146 ZZHCL STATISTIC GLUCOSE BY METER IP

## 2020-03-06 RX ADMIN — INSULIN ASPART 15 UNITS: 100 INJECTION, SOLUTION INTRAVENOUS; SUBCUTANEOUS at 10:31

## 2020-03-06 RX ADMIN — LIDOCAINE HYDROCHLORIDE 30 ML: 20 SOLUTION ORAL; TOPICAL at 11:05

## 2020-03-06 ASSESSMENT — ENCOUNTER SYMPTOMS
ENDOCRINE COMMENTS: ELEVATED BLOOD SUGARS
CHILLS: 0
ABDOMINAL PAIN: 0
VOMITING: 0
HEADACHES: 0
COUGH: 0
EYE REDNESS: 0
COLOR CHANGE: 0
FEVER: 0
DIARRHEA: 0
SHORTNESS OF BREATH: 0
DYSURIA: 0
CONFUSION: 0
NAUSEA: 0
DIFFICULTY URINATING: 0
NECK STIFFNESS: 0
ARTHRALGIAS: 0

## 2020-03-06 ASSESSMENT — MIFFLIN-ST. JEOR: SCORE: 1553.87

## 2020-03-06 NOTE — ED TRIAGE NOTES
Pt reports that BG today was 581. He took 30 units lantus at that time but no short acting insulin. Also reports that his sugar was 35 last evening. BG in triage was 454. C/o severe heartburn. GI cocktails have worked well in the past. Hx HIV, DM II, GERD.

## 2020-03-06 NOTE — ED AVS SNAPSHOT
[facility], [place], Emergency Department  [address] [place] [id number]  Phone:  [phone]                                    Andrew Lockwood   MRN: [id number]    Department:  [facility], Emergency Department   Date of Visit:  3/6/2020           After Visit Summary Signature Page    I have received my discharge instructions, and my questions have been answered. I have discussed any challenges I see with this plan with the nurse or doctor.    ..........................................................................................................................................  Patient/Patient Representative Signature      ..........................................................................................................................................  Patient Representative Print Name and Relationship to Patient    ..................................................               ................................................  Date                                   Time    ..........................................................................................................................................  Reviewed by Signature/Title    ...................................................              ..............................................  Date                                               Time          22EPIC Rev 08/18

## 2020-03-06 NOTE — ED PROVIDER NOTES
[place] EMERGENCY DEPARTMENT ([facility])  3/06/20   ED 35  History     Chief Complaint   Patient presents with     Hyperglycemia     The history is provided by the patient and medical records.     Andrew Lockwood is a 52 year old male with history of type 2 diabetes and AIDS who presents with hyperglycemia, heartburn and headache.  Patient also has a history of methamphetamine abuse and has been residing at sober housing.  He states that his previous sober house kept his insulin and did not let him take it and as a result he has had erratic blood sugar control.  He didn't take any insulin yesterday because his blood sugar dropped low to 35 and ate bread to resolve this. He states that he didn't have his diabetes supplies and was unable to recheck his blood sugar afterwards. He returned to housing at [facility] and was able to have his medications there. Today his blood sugar was in the 500s.  He did take 30 units of his long-acting insulin, Lantus, this morning but does not have any short acting insulin because he hasn't eaten yet.  He is aware that he needs to take his short acting insulin on a sliding scale. He has heartburn. No dysuria.  He denies any nausea or vomiting. No cough or congestion.  He notes having difficulty controlling his blood sugar ever since he got cortisone injection in his knee.     I have reviewed the Medications, Allergies, Past Medical and Surgical History, and Social History in the [facility] system.  PAST MEDICAL HISTORY:   Past Medical History:   Diagnosis Date     AIDS (H)      Allergic rhinitis due to other allergen     DNS     Anal dysplasia      Chronic abdominal pain      CNS toxoplasmosis (H)      Diabetes type 2, controlled (H)      GERD (gastroesophageal reflux disease)      HIV (human immunodeficiency virus infection) (H)      Periungual wart      Sleep apnea     doesn't use CPAP       PAST SURGICAL HISTORY:   Past Surgical History:   Procedure Laterality Date     C  NONSPECIFIC PROCEDURE      right forearm fracture     COLONOSCOPY Left 1/22/2016    Procedure: COMBINED COLONOSCOPY, SINGLE OR MULTIPLE BIOPSY/POLYPECTOMY BY BIOPSY;  Surgeon: Clark Saini MD;  Location: [facility] GI     HC EXPLORE UNDESC TESTIS,INGUIN/SCROTAL       LAPAROSCOPIC APPENDECTOMY N/A 1/31/2018    Procedure: LAPAROSCOPIC APPENDECTOMY;  LAPAROSCOPIC APPENDECTOMY;  Surgeon: Dawn Holt MD;  Location: [facility] OR     LAPAROSCOPY DIAGNOSTIC (GENERAL) N/A 7/26/2016    Procedure: LAPAROSCOPY DIAGNOSTIC (GENERAL);  Surgeon: Susannah Arriaga MD;  Location: [facility] OR     LAPAROSCOPY DIAGNOSTIC (GENERAL) N/A 4/16/2018    Procedure: LAPAROSCOPY DIAGNOSTIC (GENERAL);  Diagnostic laparoscopy and lysis of adhesions;  Surgeon: Prince Dowling MD;  Location: [facility] OR     OPTICAL TRACKING SYSTEM CRANIOTOMY, EXCISE TUMOR, COMBINED Left 4/10/2015    Procedure: COMBINED OPTICAL TRACKING SYSTEM CRANIOTOMY, EXCISE TUMOR;  Surgeon: Mirlande Colmenares MD;  Location: [facility] OR     REPAIR GAMEKEEPER'S THUMB Right 12/2/2016    Procedure: REPAIR LIGAMENT ULNAR COLLATERAL THUMB (GAMEKEEPER'S);  Surgeon: Evin Zamorano MD;  Location: [facility] OR       FAMILY HISTORY:   Family History   Problem Relation Age of Onset     Diabetes Brother      Diabetes Father      Alzheimer Disease Father      Unknown/Adopted Mother      Diabetes Paternal Grandfather      Cancer No family hx of         no skin cancer     Skin Cancer No family hx of         no famiy hx of skin cancer     Glaucoma No family hx of      Macular Degeneration No family hx of        SOCIAL HISTORY:   Social History     Tobacco Use     Smoking status: Current Some Day Smoker     Packs/day: 0.25     Years: 40.00     Pack years: 10.00     Types: Cigarettes     Last attempt to quit: 10/28/2016     Years since quitting: 3.3     Smokeless tobacco: Former User   Substance Use Topics     Alcohol use: No     Alcohol/week: 0.0 standard drinks     Comment: Last etoh in 2007        Patient's Medications   New Prescriptions    No medications on file   Previous Medications    BICTEGRAVIR-EMTRICITABINE-TENOFOVIR (BIKTARVY) -25 MG PER TABLET    Take 1 tablet by mouth daily    BLOOD GLUCOSE (NO BRAND SPECIFIED) LANCETS STANDARD    Use to test blood sugar four times daily or as directed.    BLOOD GLUCOSE (NO BRAND SPECIFIED) TEST STRIP    1 strip by In Vitro route 4 times daily (before meals and nightly) Use to test blood sugars 4 times daily or as directed    BLOOD GLUCOSE MONITORING (NO BRAND SPECIFIED) METER DEVICE KIT    Use to test blood sugar 4 times daily or as directed.    CICLOPIROX 8 % KIT    Externally apply 1 Application topically daily In evening, remove from nails once weekly with alcohol    FAMOTIDINE (PEPCID) 20 MG TABLET    Take 1 tablet (20 mg) by mouth 2 times daily    IBUPROFEN (ADVIL/MOTRIN) 200 MG TABLET    Take 600 mg by mouth every 4 hours as needed for mild pain    INSULIN ASPART (NOVOLOG PEN) 100 UNIT/ML PEN    Inject 15 Units Subcutaneous 3 times daily (with meals) Mealtime Scale (give in addition to 15 units):  For Pre-Meal  - 164 give 1 unit.   For Pre-Meal  - 189 give 2 units.   For Pre-Meal  - 214 give 3 units.   For Pre-Meal  - 239 give 4 units.   For Pre-Meal  - 264 give 5 units.   For Pre-Meal  - 289 give 6 units.   For Pre-Meal  - 314 give 7 units.   For Pre-Meal  - 339 give 8 units.   For Pre-Meal BG greater than 340 give 9 units.      Bedtime Scale:  For  - 224 give 1 unit.   For  - 249 give 2 units.   For  - 274 give 3 units.   For  - 299 give 4 units.   For  - 324 give 5 units.   For  - 349 give 6 units.   For BG greater than 350 give 7 units.    INSULIN GLARGINE (LANTUS PEN) 100 UNIT/ML PEN    Inject 30 Units Subcutaneous 2 times daily    INSULIN PEN NEEDLE (B-D U/F) 31G X 8 MM MISCELLANEOUS    Use 1 pen needles daily or as directed.    INSULIN PEN NEEDLE (BD PEN  "NEEDLE SCOTT 2ND GEN) 32G X 4 MM MISCELLANEOUS    Use 3-4 daily.    NICOTINE 21 MG/24HR TD 24 HR PATCH    Place 1 patch onto the skin every 24 hours    NUTRITIONAL SUPPLEMENTS (GLUCOSE MANAGEMENT) TABS    Use 1-2 tabs for hypoglycemia.    ONDANSETRON (ZOFRAN-ODT) 4 MG ODT TAB    Take 1 tablet (4 mg) by mouth every 6 hours as needed for nausea or vomiting    POLYETHYLENE GLYCOL (MIRALAX) POWDER    Take 17 g (1 capful) by mouth daily as needed for constipation    TAVABOROLE 5 % EX TOPICAL SOLUTION    Apply 1 mL topically daily    TERBINAFINE 250 MG PO TABLET    Take 1 tablet (250 mg) by mouth daily   Modified Medications    No medications on file   Discontinued Medications    No medications on file          Allergies   Allergen Reactions     Metformin      Abdominal pain     Milk [Lac Bovis] Hives     Dulaglutide Rash        Review of Systems   Constitutional: Negative for chills and fever.   HENT: Negative for congestion.    Eyes: Negative for redness.   Respiratory: Negative for cough and shortness of breath.    Cardiovascular: Negative for chest pain.   Gastrointestinal: Negative for abdominal pain, diarrhea, nausea and vomiting.   Endocrine:        Elevated blood sugars   Genitourinary: Negative for difficulty urinating and dysuria.   Musculoskeletal: Negative for arthralgias and neck stiffness.   Skin: Negative for color change.   Neurological: Negative for headaches.   Psychiatric/Behavioral: Negative for confusion.       Physical Exam   BP: 129/77  Pulse: 98  Temp: 97.9  F (36.6  C)  Resp: 16  Height: 162.6 cm (5' 4\")  Weight: 80.3 kg (177 lb)  SpO2: 99 %      Physical Exam  Vitals signs and nursing note reviewed.   Constitutional:       General: He is not in acute distress.     Appearance: Normal appearance. He is not diaphoretic.   HENT:      Head: Normocephalic and atraumatic.      Mouth/Throat:      Mouth: Mucous membranes are moist.   Eyes:      General: No scleral icterus.     Pupils: Pupils are equal, " round, and reactive to light.   Neck:      Musculoskeletal: Neck supple.   Cardiovascular:      Rate and Rhythm: Normal rate and regular rhythm.      Heart sounds: Normal heart sounds.   Pulmonary:      Effort: Pulmonary effort is normal. No respiratory distress.      Breath sounds: Normal breath sounds.   Abdominal:      General: Bowel sounds are normal.      Palpations: Abdomen is soft.      Tenderness: There is no abdominal tenderness.   Musculoskeletal: Normal range of motion.         General: No tenderness.   Skin:     General: Skin is warm.      Capillary Refill: Capillary refill takes less than 2 seconds.      Findings: No rash (]).   Neurological:      General: No focal deficit present.      Mental Status: He is alert and oriented to person, place, and time.   Psychiatric:         Mood and Affect: Mood normal.         Behavior: Behavior normal.         ED Course        Procedures           Results for orders placed or performed during the hospital encounter of 03/06/20 (from the past 24 hour(s))   Glucose by meter   Result Value Ref Range    Glucose 454 (H) 70 - 99 mg/dL     *Note: Due to a large number of results and/or encounters for the requested time period, some results have not been displayed. A complete set of results can be found in Results Review.     Medications   insulin aspart (NovoLOG) injection (RAPID ACTING) (15 Units Subcutaneous Given 3/6/20 1031)   lidocaine (XYLOCAINE) 2 % 15 mL, alum & mag hydroxide-simethicone (MAALOX  ES) 15 mL GI Cocktail (30 mLs Oral Given 3/6/20 1105)            Assessments & Plan (with Medical Decision Making)   Patient was seen and examined.  Plan of care glucose 454 in triage.  We will check some basic labs and urine here.  He was given his home 15 units of rapid acting insulin as well.  Patient is well-appearing, no signs of infection.  Labs show no signs of DKA.  I suspect medication noncompliance is a large factor in his recurrent episodes of hyperglycemia.   Patient states that he is now living in a new sober house and has better access to his insulin.  He was educated on the importance of insulin compliance and regularly checking his blood sugar.  He was instructed on signs and symptoms to return to the emergency department for.  He expressed understanding.  He was discharged home in stable condition.  Instructed to follow-up with primary care provider as soon as possible.    I have reviewed the nursing notes.    I have reviewed the findings, diagnosis, plan and need for follow up with the patient.    New Prescriptions    No medications on file       Final diagnoses:   None     IYadira, am serving as a trained medical scribe to document services personally performed by Valeri Castro DO based on the provider's statements to me on March 6, 2020.  This document has been checked and approved by the attending provider.    Valeri KELLY DO, was physically present and have reviewed and verified the accuracy of this note documented by Yadira Boss medical scribe.       3/6/2020   [facility], [place], EMERGENCY DEPARTMENT     Valeri Castro DO  03/06/20 1706

## 2020-03-07 ENCOUNTER — APPOINTMENT (OUTPATIENT)
Dept: CT IMAGING | Facility: CLINIC | Age: 55
End: 2020-03-07
Attending: FAMILY MEDICINE
Payer: COMMERCIAL

## 2020-03-07 ENCOUNTER — HOSPITAL ENCOUNTER (OUTPATIENT)
Facility: CLINIC | Age: 55
Setting detail: OBSERVATION
Discharge: HOME | End: 2020-03-08
Attending: FAMILY MEDICINE | Admitting: FAMILY MEDICINE
Payer: COMMERCIAL

## 2020-03-07 DIAGNOSIS — Z79.4 TYPE 2 DIABETES MELLITUS WITH OTHER SPECIFIED COMPLICATION, WITH LONG-TERM CURRENT USE OF INSULIN (H): ICD-10-CM

## 2020-03-07 DIAGNOSIS — K56.600 PARTIAL SMALL BOWEL OBSTRUCTION (H): ICD-10-CM

## 2020-03-07 DIAGNOSIS — E11.9 TYPE 2 DIABETES MELLITUS WITHOUT COMPLICATION, WITHOUT LONG-TERM CURRENT USE OF INSULIN (H): ICD-10-CM

## 2020-03-07 DIAGNOSIS — Z79.4 TYPE 2 DIABETES MELLITUS WITHOUT COMPLICATION, WITH LONG-TERM CURRENT USE OF INSULIN (H): ICD-10-CM

## 2020-03-07 DIAGNOSIS — E11.9 TYPE 2 DIABETES MELLITUS WITHOUT COMPLICATION, WITH LONG-TERM CURRENT USE OF INSULIN (H): ICD-10-CM

## 2020-03-07 DIAGNOSIS — R10.32 ABDOMINAL PAIN, LEFT LOWER QUADRANT: ICD-10-CM

## 2020-03-07 DIAGNOSIS — R10.9 LEFT LATERAL ABDOMINAL PAIN: ICD-10-CM

## 2020-03-07 DIAGNOSIS — B20 HUMAN IMMUNODEFICIENCY VIRUS (HIV) DISEASE (H): ICD-10-CM

## 2020-03-07 DIAGNOSIS — E11.69 TYPE 2 DIABETES MELLITUS WITH OTHER SPECIFIED COMPLICATION, WITH LONG-TERM CURRENT USE OF INSULIN (H): ICD-10-CM

## 2020-03-07 DIAGNOSIS — R11.0 NAUSEA: ICD-10-CM

## 2020-03-07 DIAGNOSIS — K56.609 SMALL BOWEL OBSTRUCTION (H): Primary | ICD-10-CM

## 2020-03-07 LAB
ALBUMIN SERPL-MCNC: 3.5 G/DL (ref 3.4–5)
ALP SERPL-CCNC: 168 U/L (ref 40–150)
ALT SERPL W P-5'-P-CCNC: 28 U/L (ref 0–70)
ANION GAP SERPL CALCULATED.3IONS-SCNC: 6 MMOL/L (ref 3–14)
APTT PPP: 23 SEC (ref 22–37)
AST SERPL W P-5'-P-CCNC: 26 U/L (ref 0–45)
BASOPHILS # BLD AUTO: 0.1 10E9/L (ref 0–0.2)
BASOPHILS NFR BLD AUTO: 0.8 %
BILIRUB SERPL-MCNC: 0.6 MG/DL (ref 0.2–1.3)
BUN SERPL-MCNC: 17 MG/DL (ref 7–30)
CALCIUM SERPL-MCNC: 9.2 MG/DL (ref 8.5–10.1)
CHLORIDE SERPL-SCNC: 105 MMOL/L (ref 94–109)
CO2 SERPL-SCNC: 24 MMOL/L (ref 20–32)
CREAT SERPL-MCNC: 0.64 MG/DL (ref 0.66–1.25)
DIFFERENTIAL METHOD BLD: ABNORMAL
EOSINOPHIL # BLD AUTO: 0.2 10E9/L (ref 0–0.7)
EOSINOPHIL NFR BLD AUTO: 2.8 %
ERYTHROCYTE [DISTWIDTH] IN BLOOD BY AUTOMATED COUNT: 14.1 % (ref 10–15)
GFR SERPL CREATININE-BSD FRML MDRD: >90 ML/MIN/{1.73_M2}
GLUCOSE BLDC GLUCOMTR-MCNC: 130 MG/DL (ref 70–99)
GLUCOSE BLDC GLUCOMTR-MCNC: 163 MG/DL (ref 70–99)
GLUCOSE BLDC GLUCOMTR-MCNC: 174 MG/DL (ref 70–99)
GLUCOSE BLDC GLUCOMTR-MCNC: 184 MG/DL (ref 70–99)
GLUCOSE BLDC GLUCOMTR-MCNC: 353 MG/DL (ref 70–99)
GLUCOSE SERPL-MCNC: 311 MG/DL (ref 70–99)
HCT VFR BLD AUTO: 41.9 % (ref 40–53)
HGB BLD-MCNC: 13.4 G/DL (ref 13.3–17.7)
IMM GRANULOCYTES # BLD: 0.1 10E9/L (ref 0–0.4)
IMM GRANULOCYTES NFR BLD: 1.8 %
INR PPP: 1.12 (ref 0.86–1.14)
LACTATE BLD-SCNC: 1.7 MMOL/L (ref 0.7–2)
LIPASE SERPL-CCNC: 208 U/L (ref 73–393)
LYMPHOCYTES # BLD AUTO: 2.3 10E9/L (ref 0.8–5.3)
LYMPHOCYTES NFR BLD AUTO: 37.4 %
MCH RBC QN AUTO: 31.3 PG (ref 26.5–33)
MCHC RBC AUTO-ENTMCNC: 32 G/DL (ref 31.5–36.5)
MCV RBC AUTO: 98 FL (ref 78–100)
MONOCYTES # BLD AUTO: 0.4 10E9/L (ref 0–1.3)
MONOCYTES NFR BLD AUTO: 6.6 %
NEUTROPHILS # BLD AUTO: 3 10E9/L (ref 1.6–8.3)
NEUTROPHILS NFR BLD AUTO: 50.6 %
NRBC # BLD AUTO: 0 10*3/UL
NRBC BLD AUTO-RTO: 0 /100
OSMOLALITY SERPL: 297 MMOL/KG (ref 275–295)
PLATELET # BLD AUTO: 208 10E9/L (ref 150–450)
PLATELET # BLD EST: ABNORMAL 10*3/UL
POTASSIUM SERPL-SCNC: 4.5 MMOL/L (ref 3.4–5.3)
PROT SERPL-MCNC: 7.1 G/DL (ref 6.8–8.8)
RBC # BLD AUTO: 4.28 10E12/L (ref 4.4–5.9)
SODIUM SERPL-SCNC: 136 MMOL/L (ref 133–144)
WBC # BLD AUTO: 6 10E9/L (ref 4–11)

## 2020-03-07 PROCEDURE — 96361 HYDRATE IV INFUSION ADD-ON: CPT

## 2020-03-07 PROCEDURE — 25000128 H RX IP 250 OP 636: Performed by: STUDENT IN AN ORGANIZED HEALTH CARE EDUCATION/TRAINING PROGRAM

## 2020-03-07 PROCEDURE — 25000128 H RX IP 250 OP 636: Performed by: FAMILY MEDICINE

## 2020-03-07 PROCEDURE — 25000132 ZZH RX MED GY IP 250 OP 250 PS 637: Performed by: STUDENT IN AN ORGANIZED HEALTH CARE EDUCATION/TRAINING PROGRAM

## 2020-03-07 PROCEDURE — 83930 ASSAY OF BLOOD OSMOLALITY: CPT | Performed by: FAMILY MEDICINE

## 2020-03-07 PROCEDURE — 96372 THER/PROPH/DIAG INJ SC/IM: CPT | Mod: 59

## 2020-03-07 PROCEDURE — 96376 TX/PRO/DX INJ SAME DRUG ADON: CPT | Performed by: FAMILY MEDICINE

## 2020-03-07 PROCEDURE — G0378 HOSPITAL OBSERVATION PER HR: HCPCS

## 2020-03-07 PROCEDURE — 74177 CT ABD & PELVIS W/CONTRAST: CPT

## 2020-03-07 PROCEDURE — 96375 TX/PRO/DX INJ NEW DRUG ADDON: CPT | Performed by: FAMILY MEDICINE

## 2020-03-07 PROCEDURE — 83605 ASSAY OF LACTIC ACID: CPT | Performed by: FAMILY MEDICINE

## 2020-03-07 PROCEDURE — 85610 PROTHROMBIN TIME: CPT | Performed by: FAMILY MEDICINE

## 2020-03-07 PROCEDURE — 25800030 ZZH RX IP 258 OP 636: Performed by: FAMILY MEDICINE

## 2020-03-07 PROCEDURE — 83690 ASSAY OF LIPASE: CPT | Performed by: FAMILY MEDICINE

## 2020-03-07 PROCEDURE — 96374 THER/PROPH/DIAG INJ IV PUSH: CPT | Performed by: FAMILY MEDICINE

## 2020-03-07 PROCEDURE — 80053 COMPREHEN METABOLIC PANEL: CPT | Performed by: FAMILY MEDICINE

## 2020-03-07 PROCEDURE — 96361 HYDRATE IV INFUSION ADD-ON: CPT | Performed by: FAMILY MEDICINE

## 2020-03-07 PROCEDURE — 96376 TX/PRO/DX INJ SAME DRUG ADON: CPT

## 2020-03-07 PROCEDURE — 85025 COMPLETE CBC W/AUTO DIFF WBC: CPT | Performed by: FAMILY MEDICINE

## 2020-03-07 PROCEDURE — 99285 EMERGENCY DEPT VISIT HI MDM: CPT | Mod: 25 | Performed by: FAMILY MEDICINE

## 2020-03-07 PROCEDURE — 99285 EMERGENCY DEPT VISIT HI MDM: CPT | Mod: Z6 | Performed by: FAMILY MEDICINE

## 2020-03-07 PROCEDURE — 00000146 ZZHCL STATISTIC GLUCOSE BY METER IP

## 2020-03-07 PROCEDURE — 25800030 ZZH RX IP 258 OP 636: Performed by: STUDENT IN AN ORGANIZED HEALTH CARE EDUCATION/TRAINING PROGRAM

## 2020-03-07 PROCEDURE — 85730 THROMBOPLASTIN TIME PARTIAL: CPT | Performed by: FAMILY MEDICINE

## 2020-03-07 RX ORDER — ONDANSETRON 2 MG/ML
4 INJECTION INTRAMUSCULAR; INTRAVENOUS EVERY 6 HOURS PRN
Status: DISCONTINUED | OUTPATIENT
Start: 2020-03-07 | End: 2020-03-08 | Stop reason: HOSPADM

## 2020-03-07 RX ORDER — ACETAMINOPHEN 325 MG/1
650 TABLET ORAL EVERY 4 HOURS PRN
Status: DISCONTINUED | OUTPATIENT
Start: 2020-03-07 | End: 2020-03-08 | Stop reason: HOSPADM

## 2020-03-07 RX ORDER — DEXTROSE, SODIUM CHLORIDE, AND POTASSIUM CHLORIDE 5; .45; .15 G/100ML; G/100ML; G/100ML
500 INJECTION INTRAVENOUS ONCE
Status: DISCONTINUED | OUTPATIENT
Start: 2020-03-07 | End: 2020-03-07

## 2020-03-07 RX ORDER — IOPAMIDOL 755 MG/ML
109 INJECTION, SOLUTION INTRAVASCULAR ONCE
Status: COMPLETED | OUTPATIENT
Start: 2020-03-07 | End: 2020-03-07

## 2020-03-07 RX ORDER — LIDOCAINE 40 MG/G
CREAM TOPICAL
Status: DISCONTINUED | OUTPATIENT
Start: 2020-03-07 | End: 2020-03-08 | Stop reason: HOSPADM

## 2020-03-07 RX ORDER — HYDROMORPHONE HYDROCHLORIDE 1 MG/ML
0.5 INJECTION, SOLUTION INTRAMUSCULAR; INTRAVENOUS; SUBCUTANEOUS ONCE
Status: COMPLETED | OUTPATIENT
Start: 2020-03-07 | End: 2020-03-07

## 2020-03-07 RX ORDER — DEXTROSE MONOHYDRATE 25 G/50ML
25-50 INJECTION, SOLUTION INTRAVENOUS
Status: DISCONTINUED | OUTPATIENT
Start: 2020-03-07 | End: 2020-03-08 | Stop reason: HOSPADM

## 2020-03-07 RX ORDER — ONDANSETRON 2 MG/ML
4 INJECTION INTRAMUSCULAR; INTRAVENOUS EVERY 30 MIN PRN
Status: DISCONTINUED | OUTPATIENT
Start: 2020-03-07 | End: 2020-03-08 | Stop reason: HOSPADM

## 2020-03-07 RX ORDER — NICOTINE POLACRILEX 4 MG
15-30 LOZENGE BUCCAL
Status: DISCONTINUED | OUTPATIENT
Start: 2020-03-07 | End: 2020-03-08 | Stop reason: HOSPADM

## 2020-03-07 RX ORDER — ACETAMINOPHEN 650 MG/1
650 SUPPOSITORY RECTAL EVERY 4 HOURS PRN
Status: DISCONTINUED | OUTPATIENT
Start: 2020-03-07 | End: 2020-03-08 | Stop reason: HOSPADM

## 2020-03-07 RX ORDER — SODIUM CHLORIDE 9 MG/ML
1000 INJECTION, SOLUTION INTRAVENOUS CONTINUOUS
Status: DISCONTINUED | OUTPATIENT
Start: 2020-03-07 | End: 2020-03-07

## 2020-03-07 RX ORDER — SODIUM CHLORIDE, SODIUM LACTATE, POTASSIUM CHLORIDE, CALCIUM CHLORIDE 600; 310; 30; 20 MG/100ML; MG/100ML; MG/100ML; MG/100ML
INJECTION, SOLUTION INTRAVENOUS CONTINUOUS
Status: DISCONTINUED | OUTPATIENT
Start: 2020-03-07 | End: 2020-03-07

## 2020-03-07 RX ORDER — DEXTROSE MONOHYDRATE, SODIUM CHLORIDE, AND POTASSIUM CHLORIDE 50; 1.49; 4.5 G/1000ML; G/1000ML; G/1000ML
INJECTION, SOLUTION INTRAVENOUS CONTINUOUS
Status: DISCONTINUED | OUTPATIENT
Start: 2020-03-07 | End: 2020-03-08

## 2020-03-07 RX ORDER — ONDANSETRON 4 MG/1
4 TABLET, ORALLY DISINTEGRATING ORAL EVERY 6 HOURS PRN
Status: DISCONTINUED | OUTPATIENT
Start: 2020-03-07 | End: 2020-03-08 | Stop reason: HOSPADM

## 2020-03-07 RX ORDER — IOPAMIDOL 755 MG/ML
109 INJECTION, SOLUTION INTRAVASCULAR ONCE
Status: DISCONTINUED | OUTPATIENT
Start: 2020-03-07 | End: 2020-03-07

## 2020-03-07 RX ORDER — NALOXONE HYDROCHLORIDE 0.4 MG/ML
.1-.4 INJECTION, SOLUTION INTRAMUSCULAR; INTRAVENOUS; SUBCUTANEOUS
Status: DISCONTINUED | OUTPATIENT
Start: 2020-03-07 | End: 2020-03-08 | Stop reason: HOSPADM

## 2020-03-07 RX ADMIN — ACETAMINOPHEN 650 MG: 325 TABLET, FILM COATED ORAL at 18:37

## 2020-03-07 RX ADMIN — ONDANSETRON 4 MG: 2 INJECTION INTRAMUSCULAR; INTRAVENOUS at 11:07

## 2020-03-07 RX ADMIN — DEXTROSE AND SODIUM CHLORIDE: 5; 900 INJECTION, SOLUTION INTRAVENOUS at 16:08

## 2020-03-07 RX ADMIN — SODIUM CHLORIDE 1000 ML: 9 INJECTION, SOLUTION INTRAVENOUS at 11:07

## 2020-03-07 RX ADMIN — ONDANSETRON 4 MG: 2 INJECTION INTRAMUSCULAR; INTRAVENOUS at 20:46

## 2020-03-07 RX ADMIN — HYDROMORPHONE HYDROCHLORIDE 0.5 MG: 1 INJECTION, SOLUTION INTRAMUSCULAR; INTRAVENOUS; SUBCUTANEOUS at 16:18

## 2020-03-07 RX ADMIN — IOPAMIDOL 109 ML: 755 INJECTION, SOLUTION INTRAVENOUS at 13:47

## 2020-03-07 RX ADMIN — POTASSIUM CHLORIDE, DEXTROSE MONOHYDRATE AND SODIUM CHLORIDE: 150; 5; 450 INJECTION, SOLUTION INTRAVENOUS at 20:11

## 2020-03-07 RX ADMIN — HYDROMORPHONE HYDROCHLORIDE 0.5 MG: 1 INJECTION, SOLUTION INTRAMUSCULAR; INTRAVENOUS; SUBCUTANEOUS at 11:08

## 2020-03-07 ASSESSMENT — ENCOUNTER SYMPTOMS
DYSPHORIC MOOD: 0
ACTIVITY CHANGE: 1
BRUISES/BLEEDS EASILY: 0
WEAKNESS: 0
DIARRHEA: 0
NAUSEA: 1
FATIGUE: 0
DIFFICULTY URINATING: 0
LIGHT-HEADEDNESS: 0
FEVER: 0
HEMATURIA: 0
HALLUCINATIONS: 0
DECREASED CONCENTRATION: 0
CONFUSION: 0
SHORTNESS OF BREATH: 0
DYSURIA: 0
SINUS PRESSURE: 0
APPETITE CHANGE: 1
CONSTIPATION: 1
VOMITING: 0
BLOOD IN STOOL: 0
FREQUENCY: 0
ABDOMINAL PAIN: 1

## 2020-03-07 ASSESSMENT — MIFFLIN-ST. JEOR: SCORE: 1558.4

## 2020-03-07 NOTE — H&P
[facility] History and Physical        Date of Admission:  3/7/2020  Date of Service: 3/7/2020         HPI      Chief Complaint   Small bowel obstruction    History is obtained from the patient    History of Present Illness   Andrew is a 52 year old male  who has a history of HIV, CNS toxoplasmosis, reflux, T2DM, homelessness, and is admitted for concern for small bowel obstruction. He reports abdominal pain that started this morning, associated with nausea and bloating. Denies emesis. He has a history of recurrent sbo and has required surgical intervention with lysis of adhesions in the past. His last bowel movement was last night. He is asking to eat a popsicle. 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 Has tested negative for Hepatitis C since stopping meth. They keep his insulin under lock and key so he hasn't been able to take the correct dosing lately. He was seen yesterday in the ED for hyperglycemia at 445 with concern for hhs.     ED Course: Afebrile, hemodynamically stable. CBC, CMP, Lipase, Lactic Acid within normal limits. CT abdomen with focal area of dilated small bowel without transition point. Admitted to observation in good condition for partial bowel obstruction.           History:   Review of Systems   The 10 point Review of Systems is negative other than noted in the HPI or here.     Past Medical History    I have reviewed this patient's medical history and updated it with pertinent information if needed.   Past Medical History:   Diagnosis Date     AIDS (H)      Allergic rhinitis due to other allergen     DNS     Anal dysplasia      Chronic abdominal pain      CNS toxoplasmosis (H)      Diabetes type 2, controlled (H)      GERD (gastroesophageal reflux disease)      HIV (human immunodeficiency virus infection) (H)      Periungual wart      Sleep apnea     doesn't use CPAP        Past Surgical  History   I have reviewed this patient's surgical history and updated it with pertinent information if needed.  Past Surgical History:   Procedure Laterality Date     C NONSPECIFIC PROCEDURE      right forearm fracture     COLONOSCOPY Left 1/22/2016    Procedure: COMBINED COLONOSCOPY, SINGLE OR MULTIPLE BIOPSY/POLYPECTOMY BY BIOPSY;  Surgeon: Clark Saini MD;  Location: [facility]     HC EXPLORE UNDESC TESTIS,INGUIN/SCROTAL       LAPAROSCOPIC APPENDECTOMY N/A 1/31/2018    Procedure: LAPAROSCOPIC APPENDECTOMY;  LAPAROSCOPIC APPENDECTOMY;  Surgeon: Dawn Holt MD;  Location: [facility] OR     LAPAROSCOPY DIAGNOSTIC (GENERAL) N/A 7/26/2016    Procedure: LAPAROSCOPY DIAGNOSTIC (GENERAL);  Surgeon: Susannah Arriaga MD;  Location: [facility] OR     LAPAROSCOPY DIAGNOSTIC (GENERAL) N/A 4/16/2018    Procedure: LAPAROSCOPY DIAGNOSTIC (GENERAL);  Diagnostic laparoscopy and lysis of adhesions;  Surgeon: Prince Dowling MD;  Location: [facility] OR     OPTICAL TRACKING SYSTEM CRANIOTOMY, EXCISE TUMOR, COMBINED Left 4/10/2015    Procedure: COMBINED OPTICAL TRACKING SYSTEM CRANIOTOMY, EXCISE TUMOR;  Surgeon: Mirlande Colmenares MD;  Location: [facility] OR     REPAIR GAMEKEEPER'S THUMB Right 12/2/2016    Procedure: REPAIR LIGAMENT ULNAR COLLATERAL THUMB (GAMEKEEPER'S);  Surgeon: Evin Zamorano MD;  Location:  OR        Social History   Social History     Tobacco Use     Smoking status: Current Some Day Smoker     Packs/day: 0.25     Years: 40.00     Pack years: 10.00     Types: Cigarettes     Last attempt to quit: 10/28/2016     Years since quitting: 3.3     Smokeless tobacco: Former User   Substance Use Topics     Alcohol use: No     Alcohol/week: 0.0 standard drinks     Comment: Last etoh in 2007     Drug use: Not Currently     Types: Marijuana, Methamphetamines     Comment: Sober 59 days on 2/28/2020       Family History   I have reviewed this patient's family history and updated it with pertinent information if  needed.   Family History   Problem Relation Age of Onset     Diabetes Brother      Diabetes Father      Alzheimer Disease Father      Unknown/Adopted Mother      Diabetes Paternal Grandfather      Cancer No family hx of         no skin cancer     Skin Cancer No family hx of         no famiy hx of skin cancer     Glaucoma No family hx of      Macular Degeneration No family hx of        Prior to Admission Medications   Prior to Admission Medications   Prescriptions Last Dose Informant Patient Reported? Taking?   Ciclopirox 8 % KIT   No No   Sig: Externally apply 1 Application topically daily In evening, remove from nails once weekly with alcohol   Nutritional Supplements (GLUCOSE MANAGEMENT) TABS   No No   Sig: Use 1-2 tabs for hypoglycemia.   Tavaborole 5 % EX topical solution   No No   Sig: Apply 1 mL topically daily   bictegravir-emtricitabine-tenofovir (BIKTARVY) -25 MG per tablet   No No   Sig: Take 1 tablet by mouth daily   blood glucose (NO BRAND SPECIFIED) lancets standard   No No   Sig: Use to test blood sugar four times daily or as directed.   blood glucose (NO BRAND SPECIFIED) test strip   No No   Si strip by In Vitro route 4 times daily (before meals and nightly) Use to test blood sugars 4 times daily or as directed   blood glucose monitoring (NO BRAND SPECIFIED) meter device kit   No No   Sig: Use to test blood sugar 4 times daily or as directed.   doxycycline hyclate (VIBRAMYCIN) 100 MG capsule   No No   Sig: Take 1 capsule (100 mg) by mouth 2 times daily for 7 days   famotidine (PEPCID) 20 MG tablet   No No   Sig: Take 1 tablet (20 mg) by mouth 2 times daily   ibuprofen (ADVIL/MOTRIN) 200 MG tablet   Yes No   Sig: Take 600 mg by mouth every 4 hours as needed for mild pain   insulin aspart (NOVOLOG PEN) 100 UNIT/ML pen   No No   Sig: Inject 15 Units Subcutaneous 3 times daily (with meals) Mealtime Scale (give in addition to 15 units):  For Pre-Meal  - 164 give 1 unit.   For Pre-Meal BG  165 - 189 give 2 units.   For Pre-Meal  - 214 give 3 units.   For Pre-Meal  - 239 give 4 units.   For Pre-Meal  - 264 give 5 units.   For Pre-Meal  - 289 give 6 units.   For Pre-Meal  - 314 give 7 units.   For Pre-Meal  - 339 give 8 units.   For Pre-Meal BG greater than 340 give 9 units.      Bedtime Scale:  For  - 224 give 1 unit.   For  - 249 give 2 units.   For  - 274 give 3 units.   For  - 299 give 4 units.   For  - 324 give 5 units.   For  - 349 give 6 units.   For BG greater than 350 give 7 units.   insulin glargine (LANTUS PEN) 100 UNIT/ML pen   Yes No   Sig: Inject 30 Units Subcutaneous 2 times daily   insulin pen needle (B-D U/F) 31G X 8 MM miscellaneous   No No   Sig: Use 1 pen needles daily or as directed.   insulin pen needle (BD PEN NEEDLE SCOTT 2ND GEN) 32G X 4 MM miscellaneous   No No   Sig: Use 3-4 daily.   nicotine 21 MG/24HR TD 24 hr patch   No No   Sig: Place 1 patch onto the skin every 24 hours   ondansetron (ZOFRAN-ODT) 4 MG ODT tab   No No   Sig: Take 1 tablet (4 mg) by mouth every 6 hours as needed for nausea or vomiting   polyethylene glycol (MIRALAX) powder   No No   Sig: Take 17 g (1 capful) by mouth daily as needed for constipation   terbinafine 250 MG PO tablet   No No   Sig: Take 1 tablet (250 mg) by mouth daily      Facility-Administered Medications: None     Allergies   Allergies   Allergen Reactions     Metformin      Abdominal pain     Milk [Lac Bovis] Hives     Dulaglutide Rash           Physical Exam      Vital Signs: Temp: 98.3  F (36.8  C) Temp src: Oral BP: 121/87 Pulse: 89 Heart Rate: 70 Resp: 14 SpO2: 100 % O2 Device: None (Room air)    Weight: 178 lbs 0 oz    Physical Exam  Constitutional:       General: He is not in acute distress.     Appearance: He is well-developed.   HENT:      Head: Normocephalic and atraumatic.   Eyes:      General: No scleral icterus.     Conjunctiva/sclera: Conjunctivae normal.    Cardiovascular:      Rate and Rhythm: Normal rate and regular rhythm.      Heart sounds: Normal heart sounds. No murmur. No friction rub. No gallop.    Pulmonary:      Effort: Pulmonary effort is normal. No respiratory distress.      Breath sounds: Normal breath sounds.   Abdominal:      General: Bowel sounds are normal. There is no distension.      Palpations: Abdomen is soft.      Tenderness: There is abdominal tenderness (mild tenderness in LUQ).      Comments: Healed laparoscopic scars noted   Neurological:      Mental Status: He is alert and oriented to person, place, and time.         Assessment & Plan      Andrew is a 52 year old male admitted on 3/7/2020. He has a history of HIV, CNS toxoplasmosis, reflux, T2DM, homelessness, and is admitted for concern for small bowel obstruction.    # Small bowel obstruction  Pt admitted to obs with 1 day of abdominal pain, nausea, bloating with a history of recurrent SBO's. CT with focal area of dilated small bowel in left abdomen, consistent with location of pain on abdominal exam. No transition point seen on CT. Likely from adhesions due to history of abdominal surgeries, but ileus from recent hyperglycemia also on the differential. Abdominal exam very soft and benign on admission. Has needed laparoscopy with ANASTASIIA in the past. Will continue to observe and manage conservatively at this point.  - NPO for this afternoon; if improving, could advance to clears this evening  - Monitor abdominal exam  - Continue MIVF with LR at 125 mL/hr  - No indication for surgical consult at this point, but would consult if abdominal exam worsening  - Tylenol for pain; can do more IV dilaudid if needed but holding off to be aware if pain worsens    # T2DM  # Concern for HHS  Pt has a history of poorly controlled T2DM, largely due to his homelessness. He has been unable recently to get the insulin he normally takes due to being in a sober house and his insulin under lock and key. Last A1c  "was 2/11/2020 and 11.6, which is improved from his previous level of 12.6 one month prior. On evaluation yesterday, there was some concern for HHS given a BG of 445 but no UA was collected. BMP within normal limits. Home regimen is Lantus 30 units BID and sliding scale insulin.   - UA to evaluate for ketones  - Serum osmolality to definitively rule out HHS  - Continue home regimen of subcutaneous insulin, but at 75% of home dosing (23 units lantus BID and LSSI)    # HIV  Diagnosed in 2015 after presenting with CNS toxo. CD4 nubia 25. ARV's since then and currently on Biktarvy since 2018 with good adherence. Last saw ID 2/2020 with undetectable viral load and CD4 of 490 at that time.   - Continue home Biktarvy (on OBS so will not order through hospital)    # H/o Meth use  Sober for ~60 days now and in sober house. Used \"all different ways.\" Has been checked for HCV since sober and was negative 2/11/2020.  - Discharge back to sober house when medically ready      # Pain Assessment:  Current Pain Score 2/26/2020   Patient currently in pain? denies   Pain score (0-10) -   Pain location -   Pain descriptors -   - Andrew is experiencing pain due to small bowel obstruction. Pain management was discussed and the plan was created in a collaborative fashion.  Andrew's response to the current recommendations: engaged  - Please see the plan for pain management as documented above      Diet: No diet orders on file  Fluids: MIVF with LR at 125 mL/hr  DVT Prophylaxis: Pneumatic Compression Devices  Code Status: Full Code    Disposition Plan   Expected discharge: Tomorrow; recommended to prior living arrangement once bowel obstruction resolved. Dispo:          Entered: Aguila Díaz 03/07/2020, 3:27 PM   Information in the above section will display in the discharge planner report.    Discussed with and examined by faculty.    [facility]  [facility] "   Pager: [phone]  Please see sticky note for cross cover information      Results:     Data   Recent Labs   Lab 03/07/20  1107 03/06/20  1024   WBC 6.0  --    HGB 13.4  --    MCV 98  --      --    INR 1.12  --     132*   POTASSIUM 4.5 3.7   CHLORIDE 105 103   CO2 24 25   BUN 17 12   CR 0.64* 0.61*   ANIONGAP 6 4   LINDA 9.2 8.8   * 445*   ALBUMIN 3.5  --    PROTTOTAL 7.1  --    BILITOTAL 0.6  --    ALKPHOS 168*  --    ALT 28  --    AST 26  --    LIPASE 208  --      Recent Results (from the past 24 hour(s))   CT Abdomen Pelvis w Contrast    Narrative    EXAMINATION: CT ABDOMEN PELVIS W CONTRAST, 3/7/2020 1:52 PM    TECHNIQUE: Helical CT images from the lung bases through the symphysis  pubis were obtained with contrast.  Coronal reformatted images were  generated at a workstation for further assessment.    CONTRAST: 109 cc Isovue 370 IV.    COMPARISON: 2/14/2020.    HISTORY: Left ab pain eval for obstruction vs diverticulitis vs other    FINDINGS:    Abdomen and pelvis:   Unremarkable appearance of the liver, gallbladder, spleen, adrenals,  and pancreas. No hydronephrosis or focal renal mass. Enlarged prostate  with mild uniform circumferential bladder wall. Unremarkable  appearance of the penile implant, including the intrapelvic reservoir  in the partially imaged right scrotal actuator.    Focal area of dilated small bowel in the left abdomen measuring up to  4.3 cm with fecalization without abrupt transition point. New tiny  metallic density within the lumen of the bowel (series 3, image 146).  The appendix is surgically absent. Mild diverticulosis without  diverticulitis. No lymphadenopathy in the abdomen or pelvis. Patent  major abdominal vasculature.    Lung bases: No consolidation or pleural effusion. Unchanged faint  ground glass opacities in the left lower lobe, stable compared to  recent exams and improved since 1/14/2020. The heart is not enlarged.  No pericardial or pleural  effusion.    Bones and soft tissues: No suspicious osseous lesions. Tiny  fat-containing umbilical hernia. Faint haziness of the subcutaneous  fat to the right periumbilical anterior abdominal wall, unchanged from  multiple previous exams and possibly representing chronic scarring.        Impression    IMPRESSION:   1. Focal area of dilated small bowel in the left abdomen measuring up  to 4.3 cm with fecalization and no abrupt transition point, question  mild partial small bowel obstruction. New tiny metallic intraluminal  density near the affected small bowel of unknown clinical  significance.  2. Mild diverticulosis without diverticulitis.  3. Stable faint residual groundglass opacities in the left lower lobe,  compatible with sequelae of previous infection.  4. Prostatomegaly and evidence of chronic bladder outlet narrowing.    I have personally reviewed the examination and initial interpretation  and I agree with the findings.    MARY ROMERO MD

## 2020-03-07 NOTE — ED PROVIDER NOTES
[facility]ERGENC[facility] DEPARTMENT ([facility])  3/07/20    History     Chief Complaint   Patient presents with     Abdominal Pain     Left lower quad/flank     The history is provided by the patient and medical records.     Andrew Lockwood is a 52 year old male with a history of type 2 diabetes mellitus, HIV/AIDS, and recurrent SBOs who presents to the Emergency Department with abdominal pain. Patient reports his pain is in the LLQ and began last night. Patient also reports nausea. Patient states he has not had a BM since yesterday, and was passing gas last night, but has not today. Patient denies vomiting or urinary symptoms. Patient has a history of bowel obstructions and abdominal surgery.  2017 patient had laparoscopic appendectomy 4 months later had lysis of adhesions also.    I have reviewed the Medications, Allergies, Past Medical and Surgical History, and Social History in the [employer] system.    Past Medical History:   Diagnosis Date     AIDS (H)      Allergic rhinitis due to other allergen     DNS     Anal dysplasia      Chronic abdominal pain      CNS toxoplasmosis (H)      Diabetes type 2, controlled (H)      GERD (gastroesophageal reflux disease)      HIV (human immunodeficiency virus infection) (H)      Periungual wart      Sleep apnea     doesn't use CPAP       Past Surgical History:   Procedure Laterality Date     C NONSPECIFIC PROCEDURE      right forearm fracture     COLONOSCOPY Left 1/22/2016    Procedure: COMBINED COLONOSCOPY, SINGLE OR MULTIPLE BIOPSY/POLYPECTOMY BY BIOPSY;  Surgeon: Clark Saini MD;  Location: [facility]     HC EXPLORE UNDESC TESTIS,INGUIN/SCROTAL       LAPAROSCOPIC APPENDECTOMY N/A 1/31/2018    Procedure: LAPAROSCOPIC APPENDECTOMY;  LAPAROSCOPIC APPENDECTOMY;  Surgeon: Dawn Holt MD;  [facility]     LAPAROSCOPY DIAGNOSTIC (GENERAL) N/A 7/26/2016    Procedure: LAPAROSCOPY DIAGNOSTIC (GENERAL);  Surgeon: Susannah Arriaga MD;  [facility]      LAPAROSCOPY DIAGNOSTIC (GENERAL) N/A 4/16/2018    Procedure: LAPAROSCOPY DIAGNOSTIC (GENERAL);  Diagnostic laparoscopy and lysis of adhesions;  Surgeon: Prince Dowling MD;  [facility]     OPTICAL TRACKING SYSTEM CRANIOTOMY, EXCISE TUMOR, COMBINED Left 4/10/2015    Procedure: COMBINED OPTICAL TRACKING SYSTEM CRANIOTOMY, EXCISE TUMOR;  Surgeon: Mirlande Colmenares MD;  [facility]     REPAIR GAMEKEEPER'S THUMB Right 12/2/2016    Procedure: REPAIR LIGAMENT ULNAR COLLATERAL THUMB (GAMEKEEPER'S);  Surgeon: Evin Zamorano MD;  Location[facility]       Family History   Problem Relation Age of Onset     Diabetes Brother      Diabetes Father      Alzheimer Disease Father      Unknown/Adopted Mother      Diabetes Paternal Grandfather      Cancer No family hx of         no skin cancer     Skin Cancer No family hx of         no famiy hx of skin cancer     Glaucoma No family hx of      Macular Degeneration No family hx of        Social History     Tobacco Use     Smoking status: Current Some Day Smoker     Packs/day: 0.25     Years: 40.00     Pack years: 10.00     Types: Cigarettes     Last attempt to quit: 10/28/2016     Years since quitting: 3.3     Smokeless tobacco: Former User   Substance Use Topics     Alcohol use: No     Alcohol/week: 0.0 standard drinks     Comment: Last etoh in 2007       Current Facility-Administered Medications   Medication     dextrose 5% and 0.9% NaCl infusion     lidocaine (LMX4) cream     lidocaine 1 % 0.1-1 mL     ondansetron (ZOFRAN) injection 4 mg     sodium chloride (PF) 0.9% PF flush 3 mL     sodium chloride (PF) 0.9% PF flush 3 mL     sodium chloride 0.9% infusion     Current Outpatient Medications   Medication     bictegravir-emtricitabine-tenofovir (BIKTARVY) -25 MG per tablet     blood glucose (NO BRAND SPECIFIED) lancets standard     blood glucose (NO BRAND SPECIFIED) test strip     blood glucose monitoring (NO BRAND SPECIFIED) meter device kit     Ciclopirox 8 %  "KIT     famotidine (PEPCID) 20 MG tablet     ibuprofen (ADVIL/MOTRIN) 200 MG tablet     insulin aspart (NOVOLOG PEN) 100 UNIT/ML pen     insulin glargine (LANTUS PEN) 100 UNIT/ML pen     insulin pen needle (B-D U/F) 31G X 8 MM miscellaneous     insulin pen needle (BD PEN NEEDLE SCOTT 2ND GEN) 32G X 4 MM miscellaneous     nicotine 21 MG/24HR TD 24 hr patch     Nutritional Supplements (GLUCOSE MANAGEMENT) TABS     ondansetron (ZOFRAN-ODT) 4 MG ODT tab     polyethylene glycol (MIRALAX) powder     Tavaborole 5 % EX topical solution     terbinafine 250 MG PO tablet        Allergies   Allergen Reactions     Metformin      Abdominal pain     Milk [Lac Bovis] Hives     Dulaglutide Rash       Review of Systems   Constitutional: Positive for activity change and appetite change. Negative for fatigue and fever.   HENT: Negative for sinus pressure.    Eyes: Negative for visual disturbance.   Respiratory: Negative for shortness of breath.    Gastrointestinal: Positive for abdominal pain (LLQ), constipation and nausea. Negative for blood in stool, diarrhea and vomiting.   Genitourinary: Negative for difficulty urinating, dysuria, frequency, hematuria and urgency.   Musculoskeletal: Negative for gait problem.   Skin: Negative for rash.   Allergic/Immunologic: Immunocompromised state: HIV.   Neurological: Negative for syncope, weakness and light-headedness.   Hematological: Does not bruise/bleed easily.   Psychiatric/Behavioral: Negative for confusion, decreased concentration, dysphoric mood and hallucinations.   All other systems reviewed and are negative.      Physical Exam   BP: 121/82  Pulse: 82  Heart Rate: 70  Temp: 98.3  F (36.8  C)  Resp: 14  Height: 162.6 cm (5' 4\")  Weight: 80.7 kg (178 lb)  SpO2: 96 %      Physical Exam  Vitals signs and nursing note reviewed.   Constitutional:       General: He is in acute distress.      Appearance: He is well-developed. He is not ill-appearing or diaphoretic.   HENT:      Head: " Normocephalic and atraumatic.      Mouth/Throat:      Mouth: Mucous membranes are moist.   Eyes:      General: No scleral icterus.     Extraocular Movements: Extraocular movements intact.      Conjunctiva/sclera: Conjunctivae normal.      Pupils: Pupils are equal, round, and reactive to light.   Neck:      Musculoskeletal: Normal range of motion and neck supple.   Cardiovascular:      Rate and Rhythm: Normal rate and regular rhythm.   Pulmonary:      Effort: No respiratory distress.   Abdominal:      General: There is no distension.      Palpations: Abdomen is soft. There is no mass.      Tenderness: There is abdominal tenderness. There is no guarding or rebound. Negative signs include Mendes's sign.      Hernia: No hernia is present.          Comments: Left mid ab tenderness   Musculoskeletal:         General: No swelling or tenderness.   Skin:     General: Skin is warm and dry.      Coloration: Skin is not jaundiced.      Findings: No rash.   Neurological:      General: No focal deficit present.      Mental Status: He is alert and oriented to person, place, and time. Mental status is at baseline.   Psychiatric:         Mood and Affect: Mood normal.         Behavior: Behavior normal.         Thought Content: Thought content normal.         Judgment: Judgment normal.         ED Course   10:38 AM  The patient was seen and examined by Sachin Rader MD in Room ED25.        Procedures        In the ER patient was evaluated.  IV established labs drawn.  Liter normal saline given.  Patient did receive 1 dose of Dilaudid for pain control in the ER.  Zofran as needed for nausea.  Labs otherwise relatively stable.  Repeat blood sugar was 174.  We did do a CT scan with contrast.  Findings do reveal that of a slightly dilated small bowel loops concerning for partial small bowel obstruction there is no definitive transition point.  Discussed patient's results with the patient himself and he does agree at this point to keep  overnight may be appropriate.  Patient still in some slight discomfort noted appears nontoxic otherwise.  Talk to medicine initially recommended trying to mid to the [facility] feels they do not have the resources for this therefore patient will be admitted to medicine observation here on the [facility].  Patient informed in the meantime we will keep patient n.p.o. but will begin hydration with D5 normal saline as his last blood sugar is proxy 170.                  Labs Ordered and Resulted from Time of ED Arrival Up to the Time of Departure from the ED   GLUCOSE BY METER - Abnormal; Notable for the following components:       Result Value    Glucose 353 (*)     All other components within normal limits   CBC WITH PLATELETS DIFFERENTIAL - Abnormal; Notable for the following components:    RBC Count 4.28 (*)     All other components within normal limits   COMPREHENSIVE METABOLIC PANEL - Abnormal; Notable for the following components:    Glucose 311 (*)     Creatinine 0.64 (*)     Alkaline Phosphatase 168 (*)     All other components within normal limits   GLUCOSE BY METER - Abnormal; Notable for the following components:    Glucose 174 (*)     All other components within normal limits   PARTIAL THROMBOPLASTIN TIME   INR   LIPASE   LACTIC ACID WHOLE BLOOD   PERIPHERAL IV CATHETER   PULSE OXIMETRY NURSING           Results for orders placed or performed during the hospital encounter of 03/07/20   CT Abdomen Pelvis w Contrast     Status: None    Narrative    EXAMINATION: CT ABDOMEN PELVIS W CONTRAST, 3/7/2020 1:52 PM    TECHNIQUE: Helical CT images from the lung bases through the symphysis  pubis were obtained with contrast.  Coronal reformatted images were  generated at a workstation for further assessment.    CONTRAST: 109 cc Isovue 370 IV.    COMPARISON: 2/14/2020.    HISTORY: Left ab pain eval for obstruction vs diverticulitis vs other    FINDINGS:    Abdomen and pelvis:   Unremarkable appearance of the  liver, gallbladder, spleen, adrenals,  and pancreas. No hydronephrosis or focal renal mass. Enlarged prostate  with mild uniform circumferential bladder wall. Unremarkable  appearance of the penile implant, including the intrapelvic reservoir  in the partially imaged right scrotal actuator.    Focal area of dilated small bowel in the left abdomen measuring up to  4.3 cm with fecalization without abrupt transition point. New tiny  metallic density within the lumen of the bowel (series 3, image 146).  The appendix is surgically absent. Mild diverticulosis without  diverticulitis. No lymphadenopathy in the abdomen or pelvis. Patent  major abdominal vasculature.    Lung bases: No consolidation or pleural effusion. Unchanged faint  ground glass opacities in the left lower lobe, stable compared to  recent exams and improved since 1/14/2020. The heart is not enlarged.  No pericardial or pleural effusion.    Bones and soft tissues: No suspicious osseous lesions. Tiny  fat-containing umbilical hernia. Faint haziness of the subcutaneous  fat to the right periumbilical anterior abdominal wall, unchanged from  multiple previous exams and possibly representing chronic scarring.        Impression    IMPRESSION:   1. Focal area of dilated small bowel in the left abdomen measuring up  to 4.3 cm with fecalization and no abrupt transition point, question  mild partial small bowel obstruction. New tiny metallic intraluminal  density near the affected small bowel of unknown clinical  significance.  2. Mild diverticulosis without diverticulitis.  3. Stable faint residual groundglass opacities in the left lower lobe,  compatible with sequelae of previous infection.  4. Prostatomegaly and evidence of chronic bladder outlet narrowing.    I have personally reviewed the examination and initial interpretation  and I agree with the findings.    MARY ROMERO MD   Glucose by meter     Status: Abnormal   Result Value Ref Range    Glucose 353  (H) 70 - 99 mg/dL   CBC with platelets differential     Status: Abnormal   Result Value Ref Range    WBC 6.0 4.0 - 11.0 10e9/L    RBC Count 4.28 (L) 4.4 - 5.9 10e12/L    Hemoglobin 13.4 13.3 - 17.7 g/dL    Hematocrit 41.9 40.0 - 53.0 %    MCV 98 78 - 100 fl    MCH 31.3 26.5 - 33.0 pg    MCHC 32.0 31.5 - 36.5 g/dL    RDW 14.1 10.0 - 15.0 %    Platelet Count 208 150 - 450 10e9/L    Diff Method Automated Method     % Neutrophils 50.6 %    % Lymphocytes 37.4 %    % Monocytes 6.6 %    % Eosinophils 2.8 %    % Basophils 0.8 %    % Immature Granulocytes 1.8 %    Nucleated RBCs 0 0 /100    Absolute Neutrophil 3.0 1.6 - 8.3 10e9/L    Absolute Lymphocytes 2.3 0.8 - 5.3 10e9/L    Absolute Monocytes 0.4 0.0 - 1.3 10e9/L    Absolute Eosinophils 0.2 0.0 - 0.7 10e9/L    Absolute Basophils 0.1 0.0 - 0.2 10e9/L    Abs Immature Granulocytes 0.1 0 - 0.4 10e9/L    Absolute Nucleated RBC 0.0     Platelet Estimate Confirming automated cell count    Partial thromboplastin time     Status: None   Result Value Ref Range    PTT 23 22 - 37 sec   INR     Status: None   Result Value Ref Range    INR 1.12 0.86 - 1.14   Comprehensive metabolic panel     Status: Abnormal   Result Value Ref Range    Sodium 136 133 - 144 mmol/L    Potassium 4.5 3.4 - 5.3 mmol/L    Chloride 105 94 - 109 mmol/L    Carbon Dioxide 24 20 - 32 mmol/L    Anion Gap 6 3 - 14 mmol/L    Glucose 311 (H) 70 - 99 mg/dL    Urea Nitrogen 17 7 - 30 mg/dL    Creatinine 0.64 (L) 0.66 - 1.25 mg/dL    GFR Estimate >90 >60 mL/min/[1.73_m2]    GFR Estimate If Black >90 >60 mL/min/[1.73_m2]    Calcium 9.2 8.5 - 10.1 mg/dL    Bilirubin Total 0.6 0.2 - 1.3 mg/dL    Albumin 3.5 3.4 - 5.0 g/dL    Protein Total 7.1 6.8 - 8.8 g/dL    Alkaline Phosphatase 168 (H) 40 - 150 U/L    ALT 28 0 - 70 U/L    AST 26 0 - 45 U/L   Lipase     Status: None   Result Value Ref Range    Lipase 208 73 - 393 U/L   Lactic acid whole blood     Status: None   Result Value Ref Range    Lactic Acid 1.7 0.7 - 2.0 mmol/L    Glucose by meter     Status: Abnormal   Result Value Ref Range    Glucose 174 (H) 70 - 99 mg/dL         Assessments & Plan (with Medical Decision Making)  52-year-old male insulin-dependent diabetes HIV history of previous bowel obstruction presents ER with left mid abdominal pain with some slight nausea.  Patient feels this may be similar to previous obstructions.  He evaluated here in the ER vitally stable did appear nontoxic.  His examination reveals soft abdomen nonrigid.  Labs otherwise stable CT scan shows an area of slight dilation of small bowel with possible partial small bowel obstruction but no transition point.  Discussed with medicine I also talked to the hospitalist [place] but feel at this point patient will be admitted to medicine observation here on the [place].  Patient is kept n.p.o. started D5 normal saline we will continue monitor blood sugars and treat accordingly patient admitted to medicine.           I have reviewed the nursing notes.    I have reviewed the findings, diagnosis, plan and need for follow up with the patient.    New Prescriptions    No medications on file       Final diagnoses:   Partial small bowel obstruction (H)   Left lateral abdominal pain   Type 2 diabetes mellitus with other specified complication, with long-term current use of insulin (H)     I, Cielo Salcedo, am serving as a trained medical scribe to document services personally performed by Sachin Rader MD, based on the provider's statements to me.      ISachin MD, was physically present and have reviewed and verified the accuracy of this note documented by Cielo Salcedo.     3/7/2020   [facility] EMERGENCY DEPARTMENT      This note was created at least in part by the use of dragon voice dictation system. Inadvertent typographical errors may still exist.  Sachin Rader MD.         Sachin Rader MD  03/07/20 [id number]

## 2020-03-07 NOTE — ED TRIAGE NOTES
Pt arrives ambulatory with c/o LLQ and left flank pain. Pt reports that it happens all the time and that it is usually a small bowel obstruction. Additionally, Pt endorses blurred vision that he feels is r/t high blood glucose. Pt was in the E.D yesterday for high blood sugar.

## 2020-03-08 ENCOUNTER — PATIENT OUTREACH (OUTPATIENT)
Dept: CARE COORDINATION | Facility: CLINIC | Age: 55
End: 2020-03-08

## 2020-03-08 VITALS
RESPIRATION RATE: 16 BRPM | WEIGHT: 178 LBS | HEART RATE: 79 BPM | TEMPERATURE: 98.4 F | DIASTOLIC BLOOD PRESSURE: 85 MMHG | BODY MASS INDEX: 30.39 KG/M2 | SYSTOLIC BLOOD PRESSURE: 116 MMHG | HEIGHT: 64 IN | OXYGEN SATURATION: 95 %

## 2020-03-08 LAB
ANION GAP SERPL CALCULATED.3IONS-SCNC: 5 MMOL/L (ref 3–14)
BASOPHILS # BLD AUTO: 0 10E9/L (ref 0–0.2)
BASOPHILS NFR BLD AUTO: 0.5 %
BUN SERPL-MCNC: 9 MG/DL (ref 7–30)
CALCIUM SERPL-MCNC: 8.7 MG/DL (ref 8.5–10.1)
CHLORIDE SERPL-SCNC: 107 MMOL/L (ref 94–109)
CO2 SERPL-SCNC: 26 MMOL/L (ref 20–32)
CREAT SERPL-MCNC: 0.63 MG/DL (ref 0.66–1.25)
DIFFERENTIAL METHOD BLD: ABNORMAL
EOSINOPHIL # BLD AUTO: 0.2 10E9/L (ref 0–0.7)
EOSINOPHIL NFR BLD AUTO: 3.2 %
ERYTHROCYTE [DISTWIDTH] IN BLOOD BY AUTOMATED COUNT: 14.1 % (ref 10–15)
GFR SERPL CREATININE-BSD FRML MDRD: >90 ML/MIN/{1.73_M2}
GLUCOSE BLDC GLUCOMTR-MCNC: 143 MG/DL (ref 70–99)
GLUCOSE BLDC GLUCOMTR-MCNC: 242 MG/DL (ref 70–99)
GLUCOSE BLDC GLUCOMTR-MCNC: 262 MG/DL (ref 70–99)
GLUCOSE BLDC GLUCOMTR-MCNC: 296 MG/DL (ref 70–99)
GLUCOSE BLDC GLUCOMTR-MCNC: 301 MG/DL (ref 70–99)
GLUCOSE SERPL-MCNC: 189 MG/DL (ref 70–99)
HCT VFR BLD AUTO: 38.8 % (ref 40–53)
HGB BLD-MCNC: 12.9 G/DL (ref 13.3–17.7)
IMM GRANULOCYTES # BLD: 0.1 10E9/L (ref 0–0.4)
IMM GRANULOCYTES NFR BLD: 1.4 %
LYMPHOCYTES # BLD AUTO: 2 10E9/L (ref 0.8–5.3)
LYMPHOCYTES NFR BLD AUTO: 34.2 %
MCH RBC QN AUTO: 31.3 PG (ref 26.5–33)
MCHC RBC AUTO-ENTMCNC: 33.2 G/DL (ref 31.5–36.5)
MCV RBC AUTO: 94 FL (ref 78–100)
MONOCYTES # BLD AUTO: 0.3 10E9/L (ref 0–1.3)
MONOCYTES NFR BLD AUTO: 5.8 %
NEUTROPHILS # BLD AUTO: 3.3 10E9/L (ref 1.6–8.3)
NEUTROPHILS NFR BLD AUTO: 54.9 %
NRBC # BLD AUTO: 0 10*3/UL
NRBC BLD AUTO-RTO: 0 /100
PLATELET # BLD AUTO: 290 10E9/L (ref 150–450)
POTASSIUM SERPL-SCNC: 3.5 MMOL/L (ref 3.4–5.3)
RBC # BLD AUTO: 4.12 10E12/L (ref 4.4–5.9)
SODIUM SERPL-SCNC: 137 MMOL/L (ref 133–144)
WBC # BLD AUTO: 5.9 10E9/L (ref 4–11)

## 2020-03-08 PROCEDURE — 80048 BASIC METABOLIC PNL TOTAL CA: CPT | Performed by: STUDENT IN AN ORGANIZED HEALTH CARE EDUCATION/TRAINING PROGRAM

## 2020-03-08 PROCEDURE — G0378 HOSPITAL OBSERVATION PER HR: HCPCS

## 2020-03-08 PROCEDURE — 25000132 ZZH RX MED GY IP 250 OP 250 PS 637: Performed by: STUDENT IN AN ORGANIZED HEALTH CARE EDUCATION/TRAINING PROGRAM

## 2020-03-08 PROCEDURE — 00000146 ZZHCL STATISTIC GLUCOSE BY METER IP

## 2020-03-08 PROCEDURE — 96361 HYDRATE IV INFUSION ADD-ON: CPT

## 2020-03-08 PROCEDURE — 36415 COLL VENOUS BLD VENIPUNCTURE: CPT | Performed by: STUDENT IN AN ORGANIZED HEALTH CARE EDUCATION/TRAINING PROGRAM

## 2020-03-08 PROCEDURE — 85025 COMPLETE CBC W/AUTO DIFF WBC: CPT | Performed by: STUDENT IN AN ORGANIZED HEALTH CARE EDUCATION/TRAINING PROGRAM

## 2020-03-08 PROCEDURE — 96372 THER/PROPH/DIAG INJ SC/IM: CPT

## 2020-03-08 RX ORDER — BISACODYL 10 MG
10 SUPPOSITORY, RECTAL RECTAL ONCE
Status: COMPLETED | OUTPATIENT
Start: 2020-03-08 | End: 2020-03-08

## 2020-03-08 RX ORDER — POLYETHYLENE GLYCOL 3350 17 G/17G
1 POWDER, FOR SOLUTION ORAL DAILY PRN
Qty: 289 G | Refills: 0 | Status: ON HOLD | OUTPATIENT
Start: 2020-03-08 | End: 2020-11-11

## 2020-03-08 RX ADMIN — ACETAMINOPHEN 650 MG: 325 TABLET, FILM COATED ORAL at 13:28

## 2020-03-08 RX ADMIN — BISACODYL 10 MG: 10 SUPPOSITORY RECTAL at 13:15

## 2020-03-08 NOTE — PROGRESS NOTES
Belongings- back pack- clothing- insulin pens.  Alert and oriented x4. Up ad lexi. IVF running- site WNL NPO NOC.

## 2020-03-08 NOTE — DISCHARGE SUMMARY
[facility], [facility] Discharge Summary- [facility]  Service    Date of Admission:  3/7/2020  Date of Service: 3/8/2020  Date of Discharge:  3/8/2020  Discharging Attending Provider: Dr. Aguila Benitez  Discharge Team: [facility]    Discharge Diagnoses   Small bowel obstruction  Type II DM  HIV    Follow-ups Needed After Discharge   Follow up with primary care provider within the next 7 days for hospital follow up.     Hospital Course   Andrew Lockwood was admitted on 3/7/2020 for nausea and bloating with imaging concerning for partial small bowel obstruction.  The following problems were addressed during his hospitalization:    # Small bowel obstruction  Pt admitted to observation with 1 day of abdominal pain, nausea, bloating with a history of recurrent SBO's. CT with focal area of dilated small bowel in left abdomen, consistent with location of pain on abdominal exam. No transition point seen on CT. Likely from adhesions due to history of abdominal surgeries. Abdominal exam very soft and benign on admission and throughout hospital stay. Has needed laparoscopy with ANASTASIIA in the past. Patient given suppository on 2nd day of admission with notable bowel movement and improvement of his symptoms. He continues to tolerate a diet and remained off IV fluids. He was appropriate to discharge home. He did request prescription for miralax.     # T2DM  # Concern for HHS  Pt has a history of poorly controlled T2DM, largely due to his homelessness. He has been unable recently to get the insulin he normally takes due to being in a sober house and his insulin under lock and key. Last A1c was 2/11/2020 and 11.6, which is improved from his previous level of 12.6 one month prior. On evaluation yesterday, there was some concern for HHS given a BG of 445 but no UA was collected. BMP within normal limits. Home regimen is Lantus 30 units BID and sliding scale insulin. Needed to be  "increased to medium sliding scale has elevated blood sugars. Will discharge on home regime.      # HIV  Diagnosed in 2015 after presenting with CNS toxo. CD4 nubia 25. ARV's since then and currently on Biktarvy since 2018 with good adherence. Last saw ID 2/2020 with undetectable viral load and CD4 of 490 at that time. Continue home Biktarvy (on OBS so will not order through hospital)     # H/o Meth use  Sober for ~60 days now and in sober house. Used \"all different ways.\" Has been checked for HCV since sober and was negative 2/11/2020. Discharged back to sober house.     # Discharge Pain Plan:   - Patient currently has NO PAIN and is not being prescribed pain medications on discharge.    Consultations This Hospital Stay   None    Code Status   Full Code       The patient was discussed with Dr. Eli Yuan[facility] Inpatient Service  [facility]   Pager:[phone]_  ___________________________________________________________________  Review of Systems:  CONSTITUTIONAL: NEGATIVE for fever, chills  INTEGUMENTARY/SKIN: NEGATIVE for worrisome rashes, moles or lesions  ENT/MOUTH: NEGATIVE for ear, mouth and throat problems  RESP: NEGATIVE for significant cough or SOB  CV: NEGATIVE for chest pain or peripheral edema  GI: NEGATIVE for nausea, abdominal pain    Physical Exam   Vital Signs: Temp: 98.4  F (36.9  C) Temp src: Oral BP: 116/85 Pulse: 79 Heart Rate: 82 Resp: 16 SpO2: 95 % O2 Device: None (Room air)    Weight: 178 lbs 0 oz    General Appearance: sitting upright in bed, NAD  Respiratory: clear bilaterally, no respiratory distress  Cardiovascular: RRR, no murmurs, rubs or gallops  GI: Soft, nontender, mild distension, +BS  Skin: No rashes or lesions on visible skin  Other: Friendly and cooperative    Significant Results and Procedures   Most Recent 3 CBC's:  Recent Labs   Lab Test 03/08/20  0649 03/07/20  1107 02/26/20  0552   WBC 5.9 6.0 6.4   HGB 12.9* 13.4 12.1*   MCV 94 " 98 96    208 240     Most Recent 3 BMP's:  Recent Labs   Lab Test 03/08/20  0649 03/07/20  1107 03/06/20  1024    136 132*   POTASSIUM 3.5 4.5 3.7   CHLORIDE 107 105 103   CO2 26 24 25   BUN 9 17 12   CR 0.63* 0.64* 0.61*   ANIONGAP 5 6 4   LINDA 8.7 9.2 8.8   * 311* 445*   ,   Results for orders placed or performed during the hospital encounter of 03/07/20   CT Abdomen Pelvis w Contrast    Narrative    EXAMINATION: CT ABDOMEN PELVIS W CONTRAST, 3/7/2020 1:52 PM    TECHNIQUE: Helical CT images from the lung bases through the symphysis  pubis were obtained with contrast.  Coronal reformatted images were  generated at a workstation for further assessment.    CONTRAST: 109 cc Isovue 370 IV.    COMPARISON: 2/14/2020.    HISTORY: Left ab pain eval for obstruction vs diverticulitis vs other    FINDINGS:    Abdomen and pelvis:   Unremarkable appearance of the liver, gallbladder, spleen, adrenals,  and pancreas. No hydronephrosis or focal renal mass. Enlarged prostate  with mild uniform circumferential bladder wall. Unremarkable  appearance of the penile implant, including the intrapelvic reservoir  in the partially imaged right scrotal actuator.    Focal area of dilated small bowel in the left abdomen measuring up to  4.3 cm with fecalization without abrupt transition point. New tiny  metallic density within the lumen of the bowel (series 3, image 146).  The appendix is surgically absent. Mild diverticulosis without  diverticulitis. No lymphadenopathy in the abdomen or pelvis. Patent  major abdominal vasculature.    Lung bases: No consolidation or pleural effusion. Unchanged faint  ground glass opacities in the left lower lobe, stable compared to  recent exams and improved since 1/14/2020. The heart is not enlarged.  No pericardial or pleural effusion.    Bones and soft tissues: No suspicious osseous lesions. Tiny  fat-containing umbilical hernia. Faint haziness of the subcutaneous  fat to the right  periumbilical anterior abdominal wall, unchanged from  multiple previous exams and possibly representing chronic scarring.        Impression    IMPRESSION:   1. Focal area of dilated small bowel in the left abdomen measuring up  to 4.3 cm with fecalization and no abrupt transition point, question  mild partial small bowel obstruction. New tiny metallic intraluminal  density near the affected small bowel of unknown clinical  significance.  2. Mild diverticulosis without diverticulitis.  3. Stable faint residual groundglass opacities in the left lower lobe,  compatible with sequelae of previous infection.  4. Prostatomegaly and evidence of chronic bladder outlet narrowing.    I have personally reviewed the examination and initial interpretation  and I agree with the findings.    MARY ROMERO MD     *Note: Due to a large number of results and/or encounters for the requested time period, some results have not been displayed. A complete set of results can be found in Results Review.       Pending Results   No pending follow up labs  Unresulted Labs Ordered in the Past 30 Days of this Admission     No orders found for last 31 day(s).             Primary Care Physician   Caitie Lamb    Discharge Disposition   Discharged to home  Condition at discharge: Stable    Discharge Orders      Reason for your hospital stay    Patient was admitted to the hospital for nausea and bloating and found to have a partial small bowel obstruction     Follow Up and recommended labs and tests    Follow up with primary care provider, Caitie Lamb, within 7 days for hospital follow- up.  No follow up labs or test are needed.     Activity    Your activity upon discharge: activity as tolerated     When to contact your care team    Call your primary doctor if you have any of the following: no bowel movement for >3 days, worsening abdominal pain or new fevers.     Discharge Instructions    Please use miralax up to twice a day  to help with daily bowel movements     Full Code     Diet    Follow this diet upon discharge: moderate carbohydrate/Type II Diabetes diet     Discharge Medications   Current Discharge Medication List      CONTINUE these medications which have CHANGED    Details   polyethylene glycol (MIRALAX) powder Take 17 g (1 capful) by mouth daily as needed for constipation  Qty: 289 g, Refills: 0    Associated Diagnoses: Small bowel obstruction (H)         CONTINUE these medications which have NOT CHANGED    Details   blood glucose (NO BRAND SPECIFIED) lancets standard Use to test blood sugar four times daily or as directed.  Qty: 100 each, Refills: 0    Associated Diagnoses: Type 2 diabetes mellitus without complication, without long-term current use of insulin (H)      bictegravir-emtricitabine-tenofovir (BIKTARVY) -25 MG per tablet Take 1 tablet by mouth daily  Qty: 30 tablet, Refills: 11    Associated Diagnoses: HIV infection, unspecified symptom status (H)      blood glucose (NO BRAND SPECIFIED) test strip 1 strip by In Vitro route 4 times daily (before meals and nightly) Use to test blood sugars 4 times daily or as directed  Qty: 100 strip, Refills: 11    Comments: Please provide test strips that are covered by insurance.  Associated Diagnoses: Type 2 diabetes mellitus with complication, without long-term current use of insulin (H)      blood glucose monitoring (NO BRAND SPECIFIED) meter device kit Use to test blood sugar 4 times daily or as directed.  Qty: 1 kit, Refills: 0    Comments: Please provide meter that is covered by insurance.  Associated Diagnoses: Type 2 diabetes mellitus with complication, without long-term current use of insulin (H)      Ciclopirox 8 % KIT Externally apply 1 Application topically daily In evening, remove from nails once weekly with alcohol  Qty: 34 mL, Refills: 1    Associated Diagnoses: Onychomycosis      famotidine (PEPCID) 20 MG tablet Take 1 tablet (20 mg) by mouth 2 times  daily  Qty: 60 tablet, Refills: 1    Associated Diagnoses: Gastroesophageal reflux disease without esophagitis      ibuprofen (ADVIL/MOTRIN) 200 MG tablet Take 600 mg by mouth every 4 hours as needed for mild pain      insulin aspart (NOVOLOG PEN) 100 UNIT/ML pen Inject 15 Units Subcutaneous 3 times daily (with meals) Mealtime Scale (give in addition to 15 units):  For Pre-Meal  - 164 give 1 unit.   For Pre-Meal  - 189 give 2 units.   For Pre-Meal  - 214 give 3 units.   For Pre-Meal  - 239 give 4 units.   For Pre-Meal  - 264 give 5 units.   For Pre-Meal  - 289 give 6 units.   For Pre-Meal  - 314 give 7 units.   For Pre-Meal  - 339 give 8 units.   For Pre-Meal BG greater than 340 give 9 units.      Bedtime Scale:  For  - 224 give 1 unit.   For  - 249 give 2 units.   For  - 274 give 3 units.   For  - 299 give 4 units.   For  - 324 give 5 units.   For  - 349 give 6 units.   For BG greater than 350 give 7 units.  Qty: 9 mL, Refills: 0    Associated Diagnoses: Type 2 diabetes mellitus without complication, with long-term current use of insulin (H)      insulin glargine (LANTUS PEN) 100 UNIT/ML pen Inject 30 Units Subcutaneous 2 times daily  Qty: 15 mL, Refills: 3    Comments: If Lantus is not covered by insurance, may substitute Basaglar at same dose and frequency.    Associated Diagnoses: Hyperglycemia      !! insulin pen needle (B-D U/F) 31G X 8 MM miscellaneous Use 1 pen needles daily or as directed.  Qty: 100 each, Refills: 3    Associated Diagnoses: Type 2 diabetes mellitus without complication, without long-term current use of insulin (H)      !! insulin pen needle (BD PEN NEEDLE SCOTT 2ND GEN) 32G X 4 MM miscellaneous Use 3-4 daily.  Qty: 250 each, Refills: 3    Associated Diagnoses: Type 2 diabetes mellitus with hyperglycemia, without long-term current use of insulin (H)      nicotine 21 MG/24HR TD 24 hr patch Place 1 patch onto  the skin every 24 hours  Qty: 28 patch, Refills: 0    Associated Diagnoses: Cigarette nicotine dependence with nicotine-induced disorder      Nutritional Supplements (GLUCOSE MANAGEMENT) TABS Use 1-2 tabs for hypoglycemia.  Qty: 30 tablet, Refills: 3    Associated Diagnoses: Type 2 diabetes mellitus with hyperglycemia, without long-term current use of insulin (H)      ondansetron (ZOFRAN-ODT) 4 MG ODT tab Take 1 tablet (4 mg) by mouth every 6 hours as needed for nausea or vomiting  Qty: 12 tablet, Refills: 0    Associated Diagnoses: SBO (small bowel obstruction) (H)      Tavaborole 5 % EX topical solution Apply 1 mL topically daily  Qty: 10 mL, Refills: 2    Associated Diagnoses: Onychomycosis      terbinafine 250 MG PO tablet Take 1 tablet (250 mg) by mouth daily  Qty: 90 tablet, Refills: 0    Associated Diagnoses: Onychomycosis       !! - Potential duplicate medications found. Please discuss with provider.      STOP taking these medications       doxycycline hyclate (VIBRAMYCIN) 100 MG capsule Comments:   Reason for Stopping:             Allergies   Allergies   Allergen Reactions     Metformin      Abdominal pain     Milk [Lac Bovis] Hives     Dulaglutide Rash

## 2020-03-08 NOTE — PLAN OF CARE
"Observation Goals:     -diagnostic tests and consults completed and resulted: not met    -vital signs normal or at patient baseline: met   -tolerating oral intake to maintain hydration: met  -adequate pain control on oral analgesics: met     Pt is denying any pain at this time. Pt is still unable to have a bowel movement and is not passing gas. Will continue to monitor.    /85 (BP Location: Right arm)   Pulse 79   Temp 98.4  F (36.9  C) (Oral)   Resp 16   Ht 1.626 m (5' 4\")   Wt 80.7 kg (178 lb)   SpO2 95%   BMI 30.55 kg/m     "

## 2020-03-08 NOTE — PLAN OF CARE
"Observation Goals:     -diagnostic tests and consults completed and resulted: not met   -vital signs normal or at patient baseline: met  -tolerating oral intake to maintain hydration: not met    -adequate pain control on oral analgesics: met     /80 (BP Location: Right arm)   Pulse 79   Temp 97.9  F (36.6  C) (Oral)   Resp 16   Ht 1.626 m (5' 4\")   Wt 80.7 kg (178 lb)   SpO2 96%   BMI 30.55 kg/m        Pt is denying any abdominal pain or discomfort as well as nausea. Pt has been up to the bathroom independently but has not had a bowel movement. Bowels are active and abdomen is soft. Will continue to monitor.   "

## 2020-03-08 NOTE — PLAN OF CARE
Outpatient/Observation goals to be met before discharge home:    -diagnostic tests and consults completed and resulted- Not Met  -vital signs normal or at patient baseline- Met   -tolerating oral intake to maintain hydration- Not Met   -adequate pain control on oral analgesics- Met     *Nurse to notify provider when observation goals have been met and patient is ready for discharge.

## 2020-03-08 NOTE — PLAN OF CARE
DC instructions given to pt, verbalized understanding.  All belongings with pt, IV DC'd and documented.       Pt ambulated to second floor discharge pharmacy.    No

## 2020-03-10 ENCOUNTER — APPOINTMENT (OUTPATIENT)
Dept: ENDOCRINOLOGY | Facility: CLINIC | Age: 55
End: 2020-03-10
Payer: COMMERCIAL

## 2020-03-10 DIAGNOSIS — K21.9 GASTROESOPHAGEAL REFLUX DISEASE WITHOUT ESOPHAGITIS: ICD-10-CM

## 2020-03-10 RX ORDER — FAMOTIDINE 20 MG/1
20 TABLET, FILM COATED ORAL 2 TIMES DAILY
Qty: 60 TABLET | Refills: 1 | Status: SHIPPED | OUTPATIENT
Start: 2020-03-10 | End: 2020-05-22

## 2020-03-10 NOTE — PROGRESS NOTES
Patient was called three times and no answer so post 24 hr DC follow up calls will be closed out    Daniela Conroy CMA   Post Hospital Discharge Team

## 2020-03-10 NOTE — TELEPHONE ENCOUNTER
"Pending Prescriptions:                       Disp   Refills    famotidine (PEPCID) 20 MG tablet          60 tab*1            Sig: Take 1 tablet (20 mg) by mouth 2 times daily    Tried faxing the request but we keep getting \"fax sent failure\" messages.    Qasim Chou  -----------------------------------------------   [email or web address]  Pharmacy Technician  [facility]: [phone]    "

## 2020-03-11 ENCOUNTER — OFFICE VISIT (OUTPATIENT)
Dept: FAMILY MEDICINE | Facility: CLINIC | Age: 55
End: 2020-03-11
Payer: COMMERCIAL

## 2020-03-11 VITALS
SYSTOLIC BLOOD PRESSURE: 140 MMHG | BODY MASS INDEX: 30.39 KG/M2 | DIASTOLIC BLOOD PRESSURE: 82 MMHG | HEIGHT: 64 IN | HEART RATE: 109 BPM | WEIGHT: 178 LBS | OXYGEN SATURATION: 97 %

## 2020-03-11 DIAGNOSIS — Z79.4 TYPE 2 DIABETES MELLITUS WITHOUT COMPLICATION, WITH LONG-TERM CURRENT USE OF INSULIN (H): ICD-10-CM

## 2020-03-11 DIAGNOSIS — K21.00 GASTROESOPHAGEAL REFLUX DISEASE WITH ESOPHAGITIS: Primary | ICD-10-CM

## 2020-03-11 DIAGNOSIS — E11.9 TYPE 2 DIABETES MELLITUS WITHOUT COMPLICATION, WITH LONG-TERM CURRENT USE OF INSULIN (H): ICD-10-CM

## 2020-03-11 DIAGNOSIS — G47.33 OSA (OBSTRUCTIVE SLEEP APNEA): ICD-10-CM

## 2020-03-11 RX ORDER — FAMOTIDINE 20 MG/1
20 TABLET, FILM COATED ORAL 2 TIMES DAILY
Qty: 30 TABLET | Refills: 3 | Status: SHIPPED | OUTPATIENT
Start: 2020-03-11 | End: 2020-04-27

## 2020-03-11 ASSESSMENT — PAIN SCALES - GENERAL: PAINLEVEL: NO PAIN (0)

## 2020-03-11 ASSESSMENT — MIFFLIN-ST. JEOR: SCORE: 1558.4

## 2020-03-11 NOTE — NURSING NOTE
"52 year old  Chief Complaint   Patient presents with     Abdominal Pain     Pt is here to discuss heartburn.      Sleep Problem     Pt is requesting sleep apnea mask.        Blood pressure (!) 140/82, pulse 109, height 1.626 m (5' 4\"), weight 80.7 kg (178 lb), SpO2 97 %. Body mass index is 30.55 kg/m .  BP completed using cuff size:      Radha Tyler, RMJACE  March 11, 2020 5:08 PM  "

## 2020-03-11 NOTE — PATIENT INSTRUCTIONS
[facility] Medication Refill Request Information:  * Please contact your pharmacy regarding ANY request for medication refills.  ** NP Clinic Prescription Fax = [phone]  * Please allow 3 business days for routine medication refills.  * Please allow 5 business days for controlled substance medication refills.     [facility] Test Result notification information:  *You will be notified with in 7-10 days of your appointment day regarding the results of your test.  If you are on MyChart you will be notified as soon as the provider has reviewed the results and signed off on them.    [facility]: [phone]

## 2020-03-11 NOTE — PROGRESS NOTES
"       HPI       Andrew Lockwood is a 52 year old man who presents Type 2 DM, Reflux, HIV. Andrew was recently hospitalized for cellulitis and a small bowel obstruction. He reports both problems are stable. Today, he is here for some refills of his medications. He also wants a CPAP mask as he has not had one in three years because he was homeless. He was diagnosed with DANISHA in 2014 at [facility]. Now he has a home and wants to start using his cpap again.  He wants to establish care with our clinic.   Chief Complaint   Patient presents with     Abdominal Pain     Pt is here to discuss heartburn.      Sleep Problem     Pt is requesting sleep apnea mask.      Problem, Medication and Allergy Lists were reviewed and updated if needed.    Patient is an established patient of this clinic.         Review of Systems:   Review of Systems     Constitutional:  Negative for fever, chills and fatigue.   Gastrointestinal:  Positive for heartburn.               Physical Exam:     Vitals:    03/11/20 1705   BP: (!) 140/82   Pulse: 109   SpO2: 97%   Weight: 80.7 kg (178 lb)   Height: 1.626 m (5' 4\")     Body mass index is 30.55 kg/m .  Vitals were reviewed and were normal.     Physical Exam  Vitals signs reviewed.   Constitutional:       Appearance: Normal appearance.   HENT:      Head: Normocephalic.   Musculoskeletal: Normal range of motion.   Skin:     General: Skin is warm and dry.   Neurological:      General: No focal deficit present.      Mental Status: He is alert and oriented to person, place, and time.   Psychiatric:         Mood and Affect: Mood normal.         Behavior: Behavior normal.         Results:     No diagnostics ordered today.     Assessment and Plan     1. Gastroesophageal reflux disease with esophagitis    - famotidine (PEPCID) 20 MG tablet; Take 1 tablet (20 mg) by mouth 2 times daily  Dispense: 30 tablet; Refill: 3    2. DANISHA (obstructive sleep apnea)    - DME REFERRAL  - Sleep DME    3. Type 2 diabetes " mellitus without complication, with long-term current use of insulin (H)      Total time spent 25 minutes.  More than 50% of the time spent with patient on counseling / coordinating his care.First, please continue taking the Pepcid. Next, please call the [employer] company to discuss your CPAP mask. Finally, please work on your diabetes management, exercise and lose weight. I will see you in follow up in 1 month for an annual physical. Options for treatment and follow-up care were reviewed with the patient. Andrew Lockwood  engaged in the decision making process and verbalized understanding of the options discussed and agreed with the final plan.  Felisa Membreno, APRN, CNP

## 2020-03-12 ENCOUNTER — DOCUMENTATION ONLY (OUTPATIENT)
Dept: SLEEP MEDICINE | Facility: CLINIC | Age: 55
End: 2020-03-12

## 2020-03-12 RX ORDER — ONDANSETRON 2 MG/ML
4 INJECTION INTRAMUSCULAR; INTRAVENOUS EVERY 30 MIN PRN
Status: CANCELLED | OUTPATIENT
Start: 2020-03-12

## 2020-03-12 RX ORDER — ACETAMINOPHEN 325 MG/1
975 TABLET ORAL ONCE
Status: CANCELLED | OUTPATIENT
Start: 2020-03-12 | End: 2020-03-12

## 2020-03-12 RX ORDER — GABAPENTIN 300 MG/1
300 CAPSULE ORAL ONCE
Status: CANCELLED | OUTPATIENT
Start: 2020-03-12 | End: 2020-03-12

## 2020-03-12 RX ORDER — FENTANYL CITRATE 50 UG/ML
25-50 INJECTION, SOLUTION INTRAMUSCULAR; INTRAVENOUS
Status: CANCELLED | OUTPATIENT
Start: 2020-03-12

## 2020-03-12 RX ORDER — NALOXONE HYDROCHLORIDE 0.4 MG/ML
.1-.4 INJECTION, SOLUTION INTRAMUSCULAR; INTRAVENOUS; SUBCUTANEOUS
Status: CANCELLED | OUTPATIENT
Start: 2020-03-12 | End: 2020-03-13

## 2020-03-12 RX ORDER — MEPERIDINE HYDROCHLORIDE 25 MG/ML
12.5 INJECTION INTRAMUSCULAR; INTRAVENOUS; SUBCUTANEOUS
Status: CANCELLED | OUTPATIENT
Start: 2020-03-12

## 2020-03-12 RX ORDER — ONDANSETRON 4 MG/1
4 TABLET, ORALLY DISINTEGRATING ORAL EVERY 30 MIN PRN
Status: CANCELLED | OUTPATIENT
Start: 2020-03-12

## 2020-03-12 RX ORDER — SODIUM CHLORIDE, SODIUM LACTATE, POTASSIUM CHLORIDE, CALCIUM CHLORIDE 600; 310; 30; 20 MG/100ML; MG/100ML; MG/100ML; MG/100ML
INJECTION, SOLUTION INTRAVENOUS CONTINUOUS
Status: CANCELLED | OUTPATIENT
Start: 2020-03-12

## 2020-03-12 RX ORDER — OXYCODONE HYDROCHLORIDE 5 MG/1
5 TABLET ORAL EVERY 4 HOURS PRN
Status: CANCELLED | OUTPATIENT
Start: 2020-03-12

## 2020-03-12 ASSESSMENT — ENCOUNTER SYMPTOMS
FEVER: 0
FATIGUE: 0
CHILLS: 0
HEARTBURN: 1

## 2020-03-12 NOTE — PROGRESS NOTES
We received pap supply order from Felisa Membreno NP. Date 03/11/2020. Per order comment to contact patient through email. But we do not have the email consent form signed. Patient has not gotten anything from us and we do not have Release of Information form signed to start transfer process.     I sent an email to Felisa Membreno NP today regarding what we need before we can give patient any pap supplies. I also let Felisa Membreno NP know that patient can stop in any one of our [facility] Showroom to sign the forms or patient can go back to the last vendor he got supplies from.

## 2020-03-13 ENCOUNTER — HOSPITAL ENCOUNTER (EMERGENCY)
Facility: CLINIC | Age: 55
Discharge: HOME | End: 2020-03-13
Attending: EMERGENCY MEDICINE | Admitting: EMERGENCY MEDICINE
Payer: COMMERCIAL

## 2020-03-13 VITALS
SYSTOLIC BLOOD PRESSURE: 126 MMHG | DIASTOLIC BLOOD PRESSURE: 85 MMHG | HEIGHT: 64 IN | TEMPERATURE: 98 F | RESPIRATION RATE: 14 BRPM | OXYGEN SATURATION: 97 % | BODY MASS INDEX: 30.55 KG/M2 | HEART RATE: 94 BPM

## 2020-03-13 DIAGNOSIS — M77.8 RIGHT ELBOW TENDONITIS: ICD-10-CM

## 2020-03-13 DIAGNOSIS — M25.561 RIGHT KNEE PAIN, UNSPECIFIED CHRONICITY: ICD-10-CM

## 2020-03-13 PROCEDURE — 99282 EMERGENCY DEPT VISIT SF MDM: CPT | Performed by: EMERGENCY MEDICINE

## 2020-03-13 PROCEDURE — 99283 EMERGENCY DEPT VISIT LOW MDM: CPT | Mod: Z6 | Performed by: EMERGENCY MEDICINE

## 2020-03-13 ASSESSMENT — ENCOUNTER SYMPTOMS
NAUSEA: 0
SHORTNESS OF BREATH: 0
ABDOMINAL PAIN: 0
DIFFICULTY URINATING: 0
FEVER: 0
CONFUSION: 0
NECK STIFFNESS: 0
EYE REDNESS: 0
CHILLS: 0
HEADACHES: 0
COLOR CHANGE: 0
VOMITING: 0
ARTHRALGIAS: 0

## 2020-03-13 NOTE — ED AVS SNAPSHOT
[facility], [place], Emergency Department  [address] [place] [id number]  Phone:  [phone]                                    Andrew Lockwood   MRN: [id number]    Department:  [facility], Emergency Department   Date of Visit:  3/13/2020           After Visit Summary Signature Page    I have received my discharge instructions, and my questions have been answered. I have discussed any challenges I see with this plan with the nurse or doctor.    ..........................................................................................................................................  Patient/Patient Representative Signature      ..........................................................................................................................................  Patient Representative Print Name and Relationship to Patient    ..................................................               ................................................  Date                                   Time    ..........................................................................................................................................  Reviewed by Signature/Title    ...................................................              ..............................................  Date                                               Time          22EPIC Rev 08/18

## 2020-03-13 NOTE — ED PROVIDER NOTES
[place] EMERGENCY DEPARTMENT ([facility])  3/13/20    History     Chief Complaint   Patient presents with     Arm Pain     right     Leg Pain     right     The history is provided by the patient and medical records.     Andrew Lockwood is a 52 year old male with a past medical history significant for DM2, HIV/AIDS, recurrent SBO, and methamphetamine abuse who presents here to the Emergency Department due to right arm and right knee pain. Patient reports that for the past 2 days he has noted pain in his right arm from his elbow to his mid forearm. Reports that he has not been overusing his arm. Patient is also noting pain behind his right knee. States that it is painful to walk on it and his leg feels weak as if his leg feels like it will give out. Denies radiation of this pain. Patient is still able to walk. Denies falls or injuries. Notes that he has not taken anything for the pain. Denies recent travel or surgery. Denies fevers, chills, chest pain, shortness of breath, nausea, vomiting or hip pain. Denies previous DVT/PE. States that he is currently 60 days sober. States that he was recently on an antibiotic for his feet.    I have reviewed the Medications, Allergies, Past Medical and Surgical History, and Social History in the [facility] system.    PAST MEDICAL HISTORY:   Past Medical History:   Diagnosis Date     AIDS (H)      Allergic rhinitis due to other allergen     DNS     Anal dysplasia      Chronic abdominal pain      CNS toxoplasmosis (H)      Diabetes type 2, controlled (H)      GERD (gastroesophageal reflux disease)      HIV (human immunodeficiency virus infection) (H)      Periungual wart      Sleep apnea     doesn't use CPAP       PAST SURGICAL HISTORY:   Past Surgical History:   Procedure Laterality Date     C NONSPECIFIC PROCEDURE      right forearm fracture     COLONOSCOPY Left 1/22/2016    Procedure: COMBINED COLONOSCOPY, SINGLE OR MULTIPLE BIOPSY/POLYPECTOMY BY BIOPSY;  Surgeon: Parveen  Clark Castillo MD;  [facility]     HC EXPLORE UNDESC TESTIS,INGUIN/SCROTAL       LAPAROSCOPIC APPENDECTOMY N/A 1/31/2018    Procedure: LAPAROSCOPIC APPENDECTOMY;  LAPAROSCOPIC APPENDECTOMY;  Surgeon: Dawn Holt MD;  [facility]     LAPAROSCOPY DIAGNOSTIC (GENERAL) N/A 7/26/2016    Procedure: LAPAROSCOPY DIAGNOSTIC (GENERAL);  Surgeon: Susannah Arriaga MD;  [facility]     LAPAROSCOPY DIAGNOSTIC (GENERAL) N/A 4/16/2018    Procedure: LAPAROSCOPY DIAGNOSTIC (GENERAL);  Diagnostic laparoscopy and lysis of adhesions;  Surgeon: Prince Dowling MD;  [facility]     OPTICAL TRACKING SYSTEM CRANIOTOMY, EXCISE TUMOR, COMBINED Left 4/10/2015    Procedure: COMBINED OPTICAL TRACKING SYSTEM CRANIOTOMY, EXCISE TUMOR;  Surgeon: Mirlande Colmenares MD;  [facility]     REPAIR GAMEKEEPER'S THUMB Right 12/2/2016    Procedure: REPAIR LIGAMENT ULNAR COLLATERAL THUMB (GAMEKEEPER'S);  Surgeon: Evin Zamorano MD;  [facility]       FAMILY HISTORY:   Family History   Problem Relation Age of Onset     Diabetes Brother      Diabetes Father      Alzheimer Disease Father      Unknown/Adopted Mother      Diabetes Paternal Grandfather      Cancer No family hx of         no skin cancer     Skin Cancer No family hx of         no famiy hx of skin cancer     Glaucoma No family hx of      Macular Degeneration No family hx of        SOCIAL HISTORY:   Social History     Tobacco Use     Smoking status: Current Some Day Smoker     Packs/day: 0.25     Years: 40.00     Pack years: 10.00     Types: Cigarettes     Last attempt to quit: 10/28/2016     Years since quitting: 3.3     Smokeless tobacco: Former User   Substance Use Topics     Alcohol use: No     Alcohol/week: 0.0 standard drinks     Comment: Last etoh in 2007       Discharge Medication List as of 3/13/2020 10:11 AM      CONTINUE these medications which have NOT CHANGED    Details   bictegravir-emtricitabine-tenofovir (BIKTARVY) -25 MG  per tablet Take 1 tablet by mouth daily, Disp-30 tablet, R-11, E-Prescribe      blood glucose (NO BRAND SPECIFIED) lancets standard Use to test blood sugar four times daily or as directed.Disp-100 each,[id number]      blood glucose (NO BRAND SPECIFIED) test strip 1 strip by In Vitro route 4 times daily (before meals and nightly) Use to test blood sugars 4 times daily or as directed, Disp-100 strip, R-11, E-PrescribePlease provide test strips that are covered by insurance.      blood glucose monitoring (NO BRAND SPECIFIED) meter device kit Use to test blood sugar 4 times daily or as directed.Disp-1 kit, [id number] provide meter that is covered by insurance.      Ciclopirox 8 % KIT Externally apply 1 Application topically daily In evening, remove from nails once weekly with alcohol, Disp-34 mL, R-1, E-Prescribe      !! famotidine (PEPCID) 20 MG tablet Take 1 tablet (20 mg) by mouth 2 times daily, Disp-30 tablet,R-3, E-Prescribe      !! famotidine (PEPCID) 20 MG tablet Take 1 tablet (20 mg) by mouth 2 times daily, Disp-60 tablet,R-1, E-Prescribe      ibuprofen (ADVIL/MOTRIN) 200 MG tablet Take 600 mg by mouth every 4 hours as needed for mild pain, Historical      insulin aspart (NOVOLOG PEN) 100 UNIT/ML pen Inject 15 Units Subcutaneous 3 times daily (with meals) Mealtime Scale (give in addition to 15 units):  For Pre-Meal  - 164 give 1 unit.   For Pre-Meal  - 189 give 2 units.   For Pre-Meal  - 214 give 3 units.   For Pre-Meal  - 239  give 4 units.   For Pre-Meal  - 264 give 5 units.   For Pre-Meal  - 289 give 6 units.   For Pre-Meal  - 314 give 7 units.   For Pre-Meal  - 339 give 8 units.   For Pre-Meal BG greater than 340 give 9 units.      Bedtime Scale:  F or  - 224 give 1 unit.   For  - 249 give 2 units.   For  - 274 give 3 units.   For  - 299 give 4 units.   For  - 324 give 5 units.   For  - 349 give 6 units.   For  BG greater than 350 give 7 units., Disp-9 mL, R-0, Loc al Print      insulin glargine (LANTUS PEN) 100 UNIT/ML pen Inject 30 Units Subcutaneous 2 times daily, Disp-15 mL, R-3, HistoricalIf Lantus is not covered by insurance, may substitute Basaglar at same dose and frequency.        !! insulin pen needle (B-D U/F) 31G X 8 MM miscellaneous Use 1 pen needles daily or as directed.Disp-100 each, [id number]      !! insulin pen needle (BD PEN NEEDLE SCOTT 2ND GEN) 32G X 4 MM miscellaneous Use 3-4 daily.Disp-250 each, [id number]-6R-Sgwabzmiy      nicotine 21 MG/24HR TD 24 hr patch Place 1 patch onto the skin every 24 hours, Disp-28 patch, R-0, E-Prescribe      Nutritional Supplements (GLUCOSE MANAGEMENT) TABS Use 1-2 tabs for hypoglycemia., Disp-30 tablet, R-3, E-Prescribe      ondansetron (ZOFRAN-ODT) 4 MG ODT tab Take 1 tablet (4 mg) by mouth every 6 hours as needed for nausea or vomiting, Disp-12 tablet, R-0, E-Prescribe      polyethylene glycol (MIRALAX) powder Take 17 g (1 capful) by mouth daily as needed for constipation, Disp-289 g,R-0, E-Prescribe      Tavaborole 5 % EX topical solution Apply 1 mL topically dailyDisp-10 mL, [id number]      terbinafine 250 MG PO tablet Take 1 tablet (250 mg) by mouth daily, Disp-90 tablet, R-0, E-Prescribe       !! - Potential duplicate medications found. Please discuss with provider.           Allergies   Allergen Reactions     Metformin      Abdominal pain     Milk [Lac Bovis] Hives     Dulaglutide Rash        Review of Systems   Constitutional: Negative for chills and fever.   HENT: Negative for congestion.    Eyes: Negative for redness.   Respiratory: Negative for shortness of breath.    Cardiovascular: Negative for chest pain.   Gastrointestinal: Negative for abdominal pain, nausea and vomiting.   Genitourinary: Negative for difficulty urinating.   Musculoskeletal: Negative for arthralgias and neck stiffness.        Positive for right arm pain  Positive for pain behind right  "knee  Negative for hip pain   Skin: Negative for color change.   Neurological: Negative for headaches.   Psychiatric/Behavioral: Negative for confusion.       Physical Exam   BP: 126/85  Pulse: 94  Temp: 98  F (36.7  C)  Resp: 12  Height: 162.6 cm (5' 4\")  SpO2: 97 %      Physical Exam  Constitutional:       General: He is not in acute distress.     Appearance: Normal appearance.   HENT:      Head: Normocephalic and atraumatic.      Nose: Nose normal.      Mouth/Throat:      Mouth: Mucous membranes are moist.   Neck:      Musculoskeletal: Normal range of motion.   Cardiovascular:      Rate and Rhythm: Normal rate and regular rhythm.   Pulmonary:      Effort: Pulmonary effort is normal.      Breath sounds: Normal breath sounds.   Abdominal:      General: Abdomen is flat.      Palpations: Abdomen is soft.      Tenderness: There is no abdominal tenderness. There is no guarding or rebound.   Musculoskeletal:      Comments: Patient's right upper extremity has full range of motion of all joints.  He has mild tenderness over the lateral aspect of the elbow there is no redness no significant swelling.  No cellulitic changes.  He has full range of motion.  Pulses capillary refill are intact.     Right lower extremity has 2+ DP and PT pulses he has full range of motion of hip knee and ankle.  There is no deformity of the right knee.  No swelling or redness.  Negative anterior posterior drawer.  Negative varus valgus stress.  He is able to ambulate.  No lower extremity swelling or redness, no edema    Left extremities were unremarkable.   Skin:     General: Skin is warm.   Neurological:      General: No focal deficit present.      Mental Status: He is alert and oriented to person, place, and time.         ED Course   9:38 AM  The patient was seen and examined by Rene Munroe MD in Room ED23.        Procedures               No results found. However, due to the size of the patient record, not all encounters were searched. " Please check Results Review for a complete set of results.  Medications - No data to display          Assessments & Plan (with Medical Decision Making)   Patient nontoxic in no acute distress presents for 2 days of atraumatic extremity pain.  Discussed that his exam is not concerning for infection.  It is more consistent for tendinitis.  He can take anti-inflammatories ice and heat for this.  In regards to the right knee he does not have any clinical exam findings or history that would need emergent ultrasound to rule out DVT.  There is no deformity or swelling or any signs for infection.  He can take anti-inflammatories for this and follow-up with his primary care physician.  I discussed signs and symptoms to return to the emergency department which would be more consistent for an infection or DVT and he understands these.  He is stable for discharge and outpatient follow-up.  I have reviewed the nursing notes.    I have reviewed the findings, diagnosis, plan and need for follow up with the patient.    Discharge Medication List as of 3/13/2020 10:11 AM          Final diagnoses:   Right elbow tendonitis   Right knee pain, unspecified chronicity     Angelo KELLY, am serving as a trained medical scribe to document services personally performed by Rene Munroe MD, based on the provider's statements to me.   Rene KELLY MD, was physically present and have reviewed and verified the accuracy of this note documented by Angelo Anderson.    3/13/2020   [facility] [place], EMERGENCY DEPARTMENT     Rene Munroe MD  03/13/20 7241

## 2020-03-18 ENCOUNTER — HOSPITAL ENCOUNTER (EMERGENCY)
Facility: CLINIC | Age: 55
Discharge: HOME | End: 2020-03-18
Attending: EMERGENCY MEDICINE | Admitting: EMERGENCY MEDICINE
Payer: COMMERCIAL

## 2020-03-18 VITALS
TEMPERATURE: 97.6 F | HEART RATE: 107 BPM | OXYGEN SATURATION: 99 % | SYSTOLIC BLOOD PRESSURE: 123 MMHG | BODY MASS INDEX: 26.61 KG/M2 | RESPIRATION RATE: 18 BRPM | WEIGHT: 155 LBS | DIASTOLIC BLOOD PRESSURE: 80 MMHG

## 2020-03-18 DIAGNOSIS — J06.9 UPPER RESPIRATORY TRACT INFECTION, UNSPECIFIED TYPE: ICD-10-CM

## 2020-03-18 PROCEDURE — 99282 EMERGENCY DEPT VISIT SF MDM: CPT | Mod: Z6 | Performed by: EMERGENCY MEDICINE

## 2020-03-18 PROCEDURE — 99282 EMERGENCY DEPT VISIT SF MDM: CPT | Performed by: EMERGENCY MEDICINE

## 2020-03-18 ASSESSMENT — ENCOUNTER SYMPTOMS: SHORTNESS OF BREATH: 1

## 2020-03-18 NOTE — ED AVS SNAPSHOT
[facility], [place], Emergency Department  [address] [place] [id number]  Phone:  [phone]                                    Andrew Lockwood   MRN: [id number]    Department:  [facility], Emergency Department   Date of Visit:  3/18/2020           After Visit Summary Signature Page    I have received my discharge instructions, and my questions have been answered. I have discussed any challenges I see with this plan with the nurse or doctor.    ..........................................................................................................................................  Patient/Patient Representative Signature      ..........................................................................................................................................  Patient Representative Print Name and Relationship to Patient    ..................................................               ................................................  Date                                   Time    ..........................................................................................................................................  Reviewed by Signature/Title    ...................................................              ..............................................  Date                                               Time          22EPIC Rev 08/18

## 2020-03-19 NOTE — ED TRIAGE NOTES
Pt arrives to ED with complaints of shortness of breath. Pt reports he has been around two roommates that have all covid-19 symptoms. VSS

## 2020-03-19 NOTE — ED PROVIDER NOTES
"    [place] EMERGENCY DEPARTMENT ([facility])  3/18/20    History     Chief Complaint   Patient presents with     Shortness of Breath     The history is provided by the patient and medical records.     Andrew Lockwood is a 52 year old male with a past medical history significant for DM2, HIV/AIDS, recurrent SBO, and methamphetamine abuse who presents here to the Emergency Department seeking a COVID-19 test. Patient reports that he has been around 2 of his roommates who have \"all\" of the COVID-19 symptoms. He is noting some shortness of breath.  He denies fever.  He states that his symptoms been going on for a few days.  He is not taking any medicines to help improve his symptoms.    I have reviewed the Medications, Allergies, Past Medical and Surgical History, and Social History in the [facility] system.  PAST MEDICAL HISTORY:   Past Medical History:   Diagnosis Date     AIDS (H)      Allergic rhinitis due to other allergen     DNS     Anal dysplasia      Chronic abdominal pain      CNS toxoplasmosis (H)      Diabetes type 2, controlled (H)      GERD (gastroesophageal reflux disease)      HIV (human immunodeficiency virus infection) (H)      Periungual wart      Sleep apnea     doesn't use CPAP       PAST SURGICAL HISTORY:   Past Surgical History:   Procedure Laterality Date     C NONSPECIFIC PROCEDURE      right forearm fracture     COLONOSCOPY Left 1/22/2016    Procedure: COMBINED COLONOSCOPY, SINGLE OR MULTIPLE BIOPSY/POLYPECTOMY BY BIOPSY;  Surgeon: Clark Saini MD;  [facility]     HC EXPLORE UNDESC TESTIS,INGUIN/SCROTAL       LAPAROSCOPIC APPENDECTOMY N/A 1/31/2018    Procedure: LAPAROSCOPIC APPENDECTOMY;  LAPAROSCOPIC APPENDECTOMY;  Surgeon: Dawn Holt MD;  [facility]     LAPAROSCOPY DIAGNOSTIC (GENERAL) N/A 7/26/2016    Procedure: LAPAROSCOPY DIAGNOSTIC (GENERAL);  Surgeon: Susannah Arriaga MD;  [facility]     LAPAROSCOPY DIAGNOSTIC (GENERAL) N/A 4/16/2018    " Procedure: LAPAROSCOPY DIAGNOSTIC (GENERAL);  Diagnostic laparoscopy and lysis of adhesions;  Surgeon: Prince Dowling MD;  [facility]     OPTICAL TRACKING SYSTEM CRANIOTOMY, EXCISE TUMOR, COMBINED Left 4/10/2015    Procedure: COMBINED OPTICAL TRACKING SYSTEM CRANIOTOMY, EXCISE TUMOR;  Surgeon: Mirlande Colmenares MD;  [facility]     REPAIR GAMEKEEPER'S THUMB Right 12/2/2016    Procedure: REPAIR LIGAMENT ULNAR COLLATERAL THUMB (GAMEKEEPER'S);  Surgeon: Evin Zamorano MD;  [facility]       FAMILY HISTORY:   Family History   Problem Relation Age of Onset     Diabetes Brother      Diabetes Father      Alzheimer Disease Father      Unknown/Adopted Mother      Diabetes Paternal Grandfather      Cancer No family hx of         no skin cancer     Skin Cancer No family hx of         no famiy hx of skin cancer     Glaucoma No family hx of      Macular Degeneration No family hx of        SOCIAL HISTORY:   Social History     Tobacco Use     Smoking status: Current Some Day Smoker     Packs/day: 0.25     Years: 40.00     Pack years: 10.00     Types: Cigarettes     Last attempt to quit: 10/28/2016     Years since quitting: 3.3     Smokeless tobacco: Former User   Substance Use Topics     Alcohol use: No     Alcohol/week: 0.0 standard drinks     Comment: Last etoh in 2007       Discharge Medication List as of 3/18/2020 10:10 PM      CONTINUE these medications which have NOT CHANGED    Details   bictegravir-emtricitabine-tenofovir (BIKTARVY) -25 MG per tablet Take 1 tablet by mouth daily, Disp-30 tablet, R-11, E-Prescribe      blood glucose (NO BRAND SPECIFIED) lancets standard Use to test blood sugar four times daily or as directed.Disp-100 each,[id number]      blood glucose (NO BRAND SPECIFIED) test strip 1 strip by In Vitro route 4 times daily (before meals and nightly) Use to test blood sugars 4 times daily or as directed, Disp-100 strip, R-11, E-PrescribePlease provide test strips that are  covered by insurance.      blood glucose monitoring (NO BRAND SPECIFIED) meter device kit Use to test blood sugar 4 times daily or as directed.Disp-1 kit, [id number] provide meter that is covered by insurance.      Ciclopirox 8 % KIT Externally apply 1 Application topically daily In evening, remove from nails once weekly with alcohol, Disp-34 mL, R-1, E-Prescribe      !! famotidine (PEPCID) 20 MG tablet Take 1 tablet (20 mg) by mouth 2 times daily, Disp-30 tablet,R-3, E-Prescribe      !! famotidine (PEPCID) 20 MG tablet Take 1 tablet (20 mg) by mouth 2 times daily, Disp-60 tablet,R-1, E-Prescribe      ibuprofen (ADVIL/MOTRIN) 200 MG tablet Take 600 mg by mouth every 4 hours as needed for mild pain, Historical      insulin aspart (NOVOLOG PEN) 100 UNIT/ML pen Inject 15 Units Subcutaneous 3 times daily (with meals) Mealtime Scale (give in addition to 15 units):  For Pre-Meal  - 164 give 1 unit.   For Pre-Meal  - 189 give 2 units.   For Pre-Meal  - 214 give 3 units.   For Pre-Meal  - 239  give 4 units.   For Pre-Meal  - 264 give 5 units.   For Pre-Meal  - 289 give 6 units.   For Pre-Meal  - 314 give 7 units.   For Pre-Meal  - 339 give 8 units.   For Pre-Meal BG greater than 340 give 9 units.      Bedtime Scale:  F or  - 224 give 1 unit.   For  - 249 give 2 units.   For  - 274 give 3 units.   For  - 299 give 4 units.   For  - 324 give 5 units.   For  - 349 give 6 units.   For BG greater than 350 give 7 units., Disp-9 mL, R-0, Loc al Print      insulin glargine (LANTUS PEN) 100 UNIT/ML pen Inject 30 Units Subcutaneous 2 times daily, Disp-15 mL, R-3, HistoricalIf Lantus is not covered by insurance, may substitute Basaglar at same dose and frequency.        !! insulin pen needle (B-D U/F) 31G X 8 MM miscellaneous Use 1 pen needles daily or as directed.Disp-100 each, [id number]      !! insulin pen needle (BD PEN NEEDLE SCOTT 2ND  GEN) 32G X 4 MM miscellaneous Use 3-4 daily.Disp-250 each, [id number]-0H-Ccjrogokg      nicotine 21 MG/24HR TD 24 hr patch Place 1 patch onto the skin every 24 hours, Disp-28 patch, R-0, E-Prescribe      Nutritional Supplements (GLUCOSE MANAGEMENT) TABS Use 1-2 tabs for hypoglycemia., Disp-30 tablet, R-3, E-Prescribe      ondansetron (ZOFRAN-ODT) 4 MG ODT tab Take 1 tablet (4 mg) by mouth every 6 hours as needed for nausea or vomiting, Disp-12 tablet, R-0, E-Prescribe      polyethylene glycol (MIRALAX) powder Take 17 g (1 capful) by mouth daily as needed for constipation, Disp-289 g,R-0, E-Prescribe      Tavaborole 5 % EX topical solution Apply 1 mL topically dailyDisp-10 mL, Y-[id number]-Jeetghndj      terbinafine 250 MG PO tablet Take 1 tablet (250 mg) by mouth daily, Disp-90 tablet, R-0, E-Prescribe       !! - Potential duplicate medications found. Please discuss with provider.             Allergies   Allergen Reactions     Metformin      Abdominal pain     Milk [Lac Bovis] Hives     Dulaglutide Rash        Review of Systems   Respiratory: Positive for shortness of breath.    All other systems reviewed and are negative.      Physical Exam   BP: (!) 144/95  Pulse: 107  Heart Rate: 108  Temp: 97.6  F (36.4  C)  Resp: 18  Weight: 70.3 kg (155 lb)  SpO2: 100 %      Physical Exam  Vitals signs reviewed.   Constitutional:       General: He is not in acute distress.     Appearance: He is well-developed. He is not diaphoretic.      Comments: Comfortably resting, lying in bed, NAD, nondiaphoretic, lucid, fully conversant, no  respiratory distress, alert and oriented.     HENT:      Head: Normocephalic and atraumatic.   Eyes:      General: No scleral icterus.  Neck:      Musculoskeletal: Normal range of motion and neck supple.   Cardiovascular:      Rate and Rhythm: Normal rate.   Pulmonary:      Effort: Pulmonary effort is normal.      Breath sounds: Normal breath sounds.   Skin:     General: Skin is warm and dry.      Findings: No rash.    Neurological:      Mental Status: He is alert and oriented to person, place, and time.         ED Course        Procedures                           No results found. However, due to the size of the patient record, not all encounters were searched. Please check Results Review for a complete set of results.  Medications - No data to display          Assessments & Plan (with Medical Decision Making)     This is a 52-year-old male coming into the emergency room who is concerned that he has shortness of breath and feels that he might have coronavirus.  He states that he has 2 roommates who were sick in the hospital but he only has mild shortness of breath.  On physical exam he has no shortness of breath, no cough and he is afebrile.  He has no other confirmed sick contacts.  At this time I do not believe he needs a further work-up in the emergency room and I encouraged him to stay home and maintain social distancing and follow-up with a primary care provider.    Pt was discharged home/self-care.  PT was provided written discharge instructions. Additionally verbal instructions were given and discussed with patient.  PT was asked to return to the ED immediately for any new or concerning symptoms.  Pt was in agreement, endorsed understanding, and questions were answered.       I have reviewed the nursing notes.    I have reviewed the findings, diagnosis, plan and need for follow up with the patient.    Discharge Medication List as of 3/18/2020 10:10 PM          Final diagnoses:   Upper respiratory tract infection, unspecified type     I, Angelo Anderson, am serving as a trained medical scribe to document services personally performed by Torres Saenz MD, based on the provider's statements to me.   I, Torres Saenz MD, was physically present and have reviewed and verified the accuracy of this note documented by Angelo Anderson.    3/18/2020   [facility], [place], EMERGENCY DEPARTMENT     Torres Saenz MD  03/19/20 0152

## 2020-03-25 DIAGNOSIS — Z79.4 TYPE 2 DIABETES MELLITUS WITHOUT COMPLICATION, WITH LONG-TERM CURRENT USE OF INSULIN (H): ICD-10-CM

## 2020-03-25 DIAGNOSIS — Z79.4 TYPE 2 DIABETES MELLITUS WITHOUT COMPLICATION, WITH LONG-TERM CURRENT USE OF INSULIN (H): Primary | ICD-10-CM

## 2020-03-25 DIAGNOSIS — E11.9 TYPE 2 DIABETES MELLITUS WITHOUT COMPLICATION, WITH LONG-TERM CURRENT USE OF INSULIN (H): Primary | ICD-10-CM

## 2020-03-25 DIAGNOSIS — E11.9 TYPE 2 DIABETES MELLITUS WITHOUT COMPLICATION, WITH LONG-TERM CURRENT USE OF INSULIN (H): ICD-10-CM

## 2020-03-25 RX ORDER — INSULIN ASPART 100 [IU]/ML
INJECTION, SOLUTION INTRAVENOUS; SUBCUTANEOUS
Qty: 3 ML | Refills: 3 | Status: SHIPPED | OUTPATIENT
Start: 2020-03-25 | End: 2020-04-27

## 2020-03-25 RX ORDER — INSULIN LISPRO 100 [IU]/ML
INJECTION, SOLUTION INTRAVENOUS; SUBCUTANEOUS
Qty: 3 ML | Refills: 11 | Status: SHIPPED | OUTPATIENT
Start: 2020-03-25 | End: 2020-05-15

## 2020-03-25 NOTE — TELEPHONE ENCOUNTER
NOVOLOG FLEXPEN 100UNIT/ML SOPN   Last Written Prescription Date:  2/27/2020  Last Fill Quantity: 9,   # refills: 0  Last Office Visit : 2/11/2020  Future Office visit:  None    Routing refill request to provider for review/approval because:  Drug not on the [facility], P or  Health refill protocol or controlled substance      Sarah Rodgers RN  Central Triage Red Flags/Med Refills

## 2020-04-01 ENCOUNTER — VIRTUAL VISIT (OUTPATIENT)
Dept: FAMILY MEDICINE | Facility: CLINIC | Age: 55
End: 2020-04-01
Payer: COMMERCIAL

## 2020-04-01 DIAGNOSIS — Z53.9 ERRONEOUS ENCOUNTER--DISREGARD: Primary | ICD-10-CM

## 2020-04-01 ASSESSMENT — PAIN SCALES - GENERAL: PAINLEVEL: NO PAIN (0)

## 2020-04-01 NOTE — NURSING NOTE
52 year old  Chief Complaint   Patient presents with     Abdominal Pain     Follow up on heartburn and sleep apnea mask. Symptoms are the same and pt hasnt received the mask.        There were no vitals taken for this visit. There is no height or weight on file to calculate BMI.  BP completed using cuff size:      MAIKOL Sofia  April 1, 2020 9:01 AM

## 2020-04-10 ENCOUNTER — TELEPHONE (OUTPATIENT)
Dept: ENDOCRINOLOGY | Facility: CLINIC | Age: 55
End: 2020-04-10

## 2020-04-10 NOTE — TELEPHONE ENCOUNTER
[facility] [phone]  SENG Langford, left detailed message and clinic number to call. Faxed Med list to living facility per message request.   Zunilda Lindo RN on 4/10/2020 at 2:47 PM

## 2020-04-20 ENCOUNTER — TELEPHONE (OUTPATIENT)
Dept: ENDOCRINOLOGY | Facility: CLINIC | Age: 55
End: 2020-04-20

## 2020-04-20 ENCOUNTER — TELEPHONE (OUTPATIENT)
Dept: INFECTIOUS DISEASES | Facility: CLINIC | Age: 55
End: 2020-04-20

## 2020-04-20 NOTE — TELEPHONE ENCOUNTER
I spoke with a nurse from the   [facility] he is staying at  Currently. THey have his  Insulin doses down but wanted another letter that has the doses as well as when to call  Us with  blood sugars. He had a couple in the 400's this weekend but he had forgotten to give insulin . Staff is wanting to know guidelines. If over 2 blood sugars in a week  Less than 70 or if blood sugars are over 400  call provider . Just an example. They are aware that he has had infections in the past month so should be aware of any fevers as this will also  Increase the blood sugars. Their phone is  [phone] and Fax [phone]. Aleah Hampton RN on 4/20/2020 at 3:14 PM

## 2020-04-21 NOTE — TELEPHONE ENCOUNTER
They will fax his information over Thursday morning  but did correct me. It is Lantus 30 units am and 30 units PM, Humalog is 10-15 units with meals plus sliding scale that you had sent.   He is checking his BS as requested . Aleah ELLSWORTH

## 2020-04-21 NOTE — TELEPHONE ENCOUNTER
He is still on Lantus 30 units  Daily and Humalog 1-7 units  with meals TIAshley May PA-C Schwendeman, Connie M, RN    Caller: Unspecified (Yesterday,  3:10 PM)               Did they give you the insulin doses he is currently taking?   I'll need that for the letter.   Thanks   dominic Hampton, RN on 4/21/2020 at 10:43 AM

## 2020-04-23 DIAGNOSIS — E11.8 TYPE 2 DIABETES MELLITUS WITH COMPLICATION, WITHOUT LONG-TERM CURRENT USE OF INSULIN (H): ICD-10-CM

## 2020-04-27 ENCOUNTER — APPOINTMENT (OUTPATIENT)
Dept: GENERAL RADIOLOGY | Facility: CLINIC | Age: 55
End: 2020-04-27
Attending: EMERGENCY MEDICINE
Payer: COMMERCIAL

## 2020-04-27 ENCOUNTER — HOSPITAL ENCOUNTER (INPATIENT)
Facility: CLINIC | Age: 55
LOS: 2 days | Discharge: HOME | End: 2020-04-29
Attending: EMERGENCY MEDICINE | Admitting: INTERNAL MEDICINE
Payer: COMMERCIAL

## 2020-04-27 ENCOUNTER — APPOINTMENT (OUTPATIENT)
Dept: CT IMAGING | Facility: CLINIC | Age: 55
End: 2020-04-27
Attending: EMERGENCY MEDICINE
Payer: COMMERCIAL

## 2020-04-27 DIAGNOSIS — K59.00 CONSTIPATION, UNSPECIFIED CONSTIPATION TYPE: Primary | ICD-10-CM

## 2020-04-27 DIAGNOSIS — K56.600 PARTIAL SMALL BOWEL OBSTRUCTION (H): ICD-10-CM

## 2020-04-27 LAB
ALBUMIN SERPL-MCNC: 3.6 G/DL (ref 3.4–5)
ALBUMIN UR-MCNC: 10 MG/DL
ALP SERPL-CCNC: 96 U/L (ref 40–150)
ALT SERPL W P-5'-P-CCNC: 23 U/L (ref 0–70)
ANION GAP SERPL CALCULATED.3IONS-SCNC: 3 MMOL/L (ref 3–14)
APPEARANCE UR: CLEAR
AST SERPL W P-5'-P-CCNC: 16 U/L (ref 0–45)
BASOPHILS # BLD AUTO: 0.1 10E9/L (ref 0–0.2)
BASOPHILS NFR BLD AUTO: 0.8 %
BILIRUB SERPL-MCNC: 0.8 MG/DL (ref 0.2–1.3)
BILIRUB UR QL STRIP: NEGATIVE
BUN SERPL-MCNC: 16 MG/DL (ref 7–30)
CA-I BLD-SCNC: 4.7 MG/DL (ref 4.4–5.2)
CALCIUM SERPL-MCNC: 9 MG/DL (ref 8.5–10.1)
CHLORIDE SERPL-SCNC: 106 MMOL/L (ref 94–109)
CO2 BLDCOV-SCNC: 29 MMOL/L (ref 21–28)
CO2 SERPL-SCNC: 29 MMOL/L (ref 20–32)
COLOR UR AUTO: YELLOW
CREAT BLD-MCNC: 0.7 MG/DL (ref 0.66–1.25)
CREAT SERPL-MCNC: 0.69 MG/DL (ref 0.66–1.25)
CREAT SERPL-MCNC: 0.74 MG/DL (ref 0.66–1.25)
DIFFERENTIAL METHOD BLD: ABNORMAL
EOSINOPHIL # BLD AUTO: 0.2 10E9/L (ref 0–0.7)
EOSINOPHIL NFR BLD AUTO: 2.8 %
ERYTHROCYTE [DISTWIDTH] IN BLOOD BY AUTOMATED COUNT: 12.4 % (ref 10–15)
GFR SERPL CREATININE-BSD FRML MDRD: >90 ML/MIN/{1.73_M2}
GLUCOSE BLD-MCNC: 213 MG/DL (ref 70–99)
GLUCOSE BLDC GLUCOMTR-MCNC: 151 MG/DL (ref 70–99)
GLUCOSE SERPL-MCNC: 209 MG/DL (ref 70–99)
GLUCOSE UR STRIP-MCNC: 300 MG/DL
HCT VFR BLD AUTO: 38 % (ref 40–53)
HCT VFR BLD CALC: 40 %PCV (ref 40–53)
HGB BLD CALC-MCNC: 13.6 G/DL (ref 13.3–17.7)
HGB BLD-MCNC: 13 G/DL (ref 13.3–17.7)
HGB UR QL STRIP: NEGATIVE
IMM GRANULOCYTES # BLD: 0.1 10E9/L (ref 0–0.4)
IMM GRANULOCYTES NFR BLD: 1.5 %
INTERPRETATION ECG - MUSE: NORMAL
KETONES UR STRIP-MCNC: NEGATIVE MG/DL
LACTATE BLD-SCNC: 1.2 MMOL/L (ref 0.7–2)
LEUKOCYTE ESTERASE UR QL STRIP: NEGATIVE
LIPASE SERPL-CCNC: 151 U/L (ref 73–393)
LYMPHOCYTES # BLD AUTO: 1.7 10E9/L (ref 0.8–5.3)
LYMPHOCYTES NFR BLD AUTO: 28.1 %
MAGNESIUM SERPL-MCNC: 1.8 MG/DL (ref 1.6–2.3)
MCH RBC QN AUTO: 32 PG (ref 26.5–33)
MCHC RBC AUTO-ENTMCNC: 34.2 G/DL (ref 31.5–36.5)
MCV RBC AUTO: 94 FL (ref 78–100)
MONOCYTES # BLD AUTO: 0.5 10E9/L (ref 0–1.3)
MONOCYTES NFR BLD AUTO: 8.5 %
MUCOUS THREADS #/AREA URNS LPF: PRESENT /LPF
NEUTROPHILS # BLD AUTO: 3.6 10E9/L (ref 1.6–8.3)
NEUTROPHILS NFR BLD AUTO: 58.3 %
NITRATE UR QL: NEGATIVE
NRBC # BLD AUTO: 0 10*3/UL
NRBC BLD AUTO-RTO: 0 /100
PCO2 BLDV: 41 MM HG (ref 40–50)
PH BLDV: 7.46 PH (ref 7.32–7.43)
PH UR STRIP: 8 PH (ref 5–7)
PLATELET # BLD AUTO: 288 10E9/L (ref 150–450)
PLATELET # BLD AUTO: 325 10E9/L (ref 150–450)
PO2 BLDV: 51 MM HG (ref 25–47)
POTASSIUM BLD-SCNC: 3.6 MMOL/L (ref 3.4–5.3)
POTASSIUM SERPL-SCNC: 3.5 MMOL/L (ref 3.4–5.3)
PROT SERPL-MCNC: 7.2 G/DL (ref 6.8–8.8)
RBC # BLD AUTO: 4.06 10E12/L (ref 4.4–5.9)
RBC #/AREA URNS AUTO: 2 /HPF (ref 0–2)
SAO2 % BLDV FROM PO2: 87 %
SODIUM BLD-SCNC: 140 MMOL/L (ref 133–144)
SODIUM SERPL-SCNC: 138 MMOL/L (ref 133–144)
SOURCE: ABNORMAL
SP GR UR STRIP: 1 (ref 1–1.03)
UROBILINOGEN UR STRIP-MCNC: NORMAL MG/DL (ref 0–2)
WBC # BLD AUTO: 6.1 10E9/L (ref 4–11)
WBC #/AREA URNS AUTO: 1 /HPF (ref 0–5)

## 2020-04-27 PROCEDURE — 82565 ASSAY OF CREATININE: CPT

## 2020-04-27 PROCEDURE — 82565 ASSAY OF CREATININE: CPT | Performed by: STUDENT IN AN ORGANIZED HEALTH CARE EDUCATION/TRAINING PROGRAM

## 2020-04-27 PROCEDURE — 99285 EMERGENCY DEPT VISIT HI MDM: CPT | Mod: Z6 | Performed by: EMERGENCY MEDICINE

## 2020-04-27 PROCEDURE — 40000501 ZZHCL STATISTIC HEMATOCRIT ED POCT

## 2020-04-27 PROCEDURE — 74177 CT ABD & PELVIS W/CONTRAST: CPT

## 2020-04-27 PROCEDURE — 00000146 ZZHCL STATISTIC GLUCOSE BY METER IP

## 2020-04-27 PROCEDURE — 40000502 ZZHCL STATISTIC GLUCOSE ED POCT

## 2020-04-27 PROCEDURE — 25000128 H RX IP 250 OP 636: Performed by: STUDENT IN AN ORGANIZED HEALTH CARE EDUCATION/TRAINING PROGRAM

## 2020-04-27 PROCEDURE — 96375 TX/PRO/DX INJ NEW DRUG ADDON: CPT

## 2020-04-27 PROCEDURE — 96361 HYDRATE IV INFUSION ADD-ON: CPT

## 2020-04-27 PROCEDURE — 82803 BLOOD GASES ANY COMBINATION: CPT

## 2020-04-27 PROCEDURE — 40000986 XR ABDOMEN PORT 1 VW

## 2020-04-27 PROCEDURE — 25800030 ZZH RX IP 258 OP 636: Performed by: EMERGENCY MEDICINE

## 2020-04-27 PROCEDURE — 40000498 ZZHCL STATISTIC POTASSIUM ED POCT

## 2020-04-27 PROCEDURE — 25000128 H RX IP 250 OP 636: Performed by: EMERGENCY MEDICINE

## 2020-04-27 PROCEDURE — 96365 THER/PROPH/DIAG IV INF INIT: CPT | Mod: 59

## 2020-04-27 PROCEDURE — 99285 EMERGENCY DEPT VISIT HI MDM: CPT | Mod: 25

## 2020-04-27 PROCEDURE — 83735 ASSAY OF MAGNESIUM: CPT | Performed by: EMERGENCY MEDICINE

## 2020-04-27 PROCEDURE — 82330 ASSAY OF CALCIUM: CPT

## 2020-04-27 PROCEDURE — 80053 COMPREHEN METABOLIC PANEL: CPT | Performed by: EMERGENCY MEDICINE

## 2020-04-27 PROCEDURE — 85049 AUTOMATED PLATELET COUNT: CPT | Performed by: STUDENT IN AN ORGANIZED HEALTH CARE EDUCATION/TRAINING PROGRAM

## 2020-04-27 PROCEDURE — 85025 COMPLETE CBC W/AUTO DIFF WBC: CPT | Performed by: EMERGENCY MEDICINE

## 2020-04-27 PROCEDURE — 93005 ELECTROCARDIOGRAM TRACING: CPT

## 2020-04-27 PROCEDURE — 25800030 ZZH RX IP 258 OP 636: Performed by: STUDENT IN AN ORGANIZED HEALTH CARE EDUCATION/TRAINING PROGRAM

## 2020-04-27 PROCEDURE — 25000132 ZZH RX MED GY IP 250 OP 250 PS 637: Performed by: STUDENT IN AN ORGANIZED HEALTH CARE EDUCATION/TRAINING PROGRAM

## 2020-04-27 PROCEDURE — 83690 ASSAY OF LIPASE: CPT | Performed by: EMERGENCY MEDICINE

## 2020-04-27 PROCEDURE — 25000132 ZZH RX MED GY IP 250 OP 250 PS 637: Performed by: EMERGENCY MEDICINE

## 2020-04-27 PROCEDURE — 25000125 ZZHC RX 250: Performed by: EMERGENCY MEDICINE

## 2020-04-27 PROCEDURE — 25000131 ZZH RX MED GY IP 250 OP 636 PS 637: Performed by: STUDENT IN AN ORGANIZED HEALTH CARE EDUCATION/TRAINING PROGRAM

## 2020-04-27 PROCEDURE — 36415 COLL VENOUS BLD VENIPUNCTURE: CPT | Performed by: STUDENT IN AN ORGANIZED HEALTH CARE EDUCATION/TRAINING PROGRAM

## 2020-04-27 PROCEDURE — 83605 ASSAY OF LACTIC ACID: CPT | Performed by: EMERGENCY MEDICINE

## 2020-04-27 PROCEDURE — 81001 URINALYSIS AUTO W/SCOPE: CPT | Performed by: EMERGENCY MEDICINE

## 2020-04-27 PROCEDURE — 12000001 ZZH R&B MED SURG/OB UMMC

## 2020-04-27 PROCEDURE — 40000497 ZZHCL STATISTIC SODIUM ED POCT

## 2020-04-27 RX ORDER — PROCHLORPERAZINE MALEATE 5 MG
10 TABLET ORAL EVERY 6 HOURS PRN
Status: DISCONTINUED | OUTPATIENT
Start: 2020-04-27 | End: 2020-04-29 | Stop reason: HOSPADM

## 2020-04-27 RX ORDER — SODIUM CHLORIDE 9 MG/ML
INJECTION, SOLUTION INTRAVENOUS CONTINUOUS
Status: DISCONTINUED | OUTPATIENT
Start: 2020-04-27 | End: 2020-04-29 | Stop reason: HOSPADM

## 2020-04-27 RX ORDER — ONDANSETRON 2 MG/ML
4 INJECTION INTRAMUSCULAR; INTRAVENOUS ONCE
Status: COMPLETED | OUTPATIENT
Start: 2020-04-27 | End: 2020-04-27

## 2020-04-27 RX ORDER — POTASSIUM CHLORIDE 7.45 MG/ML
10 INJECTION INTRAVENOUS CONTINUOUS
Status: DISCONTINUED | OUTPATIENT
Start: 2020-04-27 | End: 2020-04-27

## 2020-04-27 RX ORDER — POLYETHYLENE GLYCOL 3350 17 G/17G
17 POWDER, FOR SOLUTION ORAL 3 TIMES DAILY
Status: DISCONTINUED | OUTPATIENT
Start: 2020-04-28 | End: 2020-04-29 | Stop reason: HOSPADM

## 2020-04-27 RX ORDER — LIDOCAINE 40 MG/G
CREAM TOPICAL
Status: DISCONTINUED | OUTPATIENT
Start: 2020-04-27 | End: 2020-04-29 | Stop reason: HOSPADM

## 2020-04-27 RX ORDER — ONDANSETRON 4 MG/1
4 TABLET, ORALLY DISINTEGRATING ORAL EVERY 6 HOURS PRN
Status: DISCONTINUED | OUTPATIENT
Start: 2020-04-27 | End: 2020-04-29 | Stop reason: HOSPADM

## 2020-04-27 RX ORDER — HYDROMORPHONE HYDROCHLORIDE 1 MG/ML
0.5 INJECTION, SOLUTION INTRAMUSCULAR; INTRAVENOUS; SUBCUTANEOUS ONCE
Status: COMPLETED | OUTPATIENT
Start: 2020-04-27 | End: 2020-04-27

## 2020-04-27 RX ORDER — TERBINAFINE HYDROCHLORIDE 250 MG/1
250 TABLET ORAL DAILY
Status: DISCONTINUED | OUTPATIENT
Start: 2020-04-28 | End: 2020-04-27

## 2020-04-27 RX ORDER — NALOXONE HYDROCHLORIDE 0.4 MG/ML
.1-.4 INJECTION, SOLUTION INTRAMUSCULAR; INTRAVENOUS; SUBCUTANEOUS
Status: DISCONTINUED | OUTPATIENT
Start: 2020-04-27 | End: 2020-04-29 | Stop reason: HOSPADM

## 2020-04-27 RX ORDER — PROCHLORPERAZINE 25 MG
25 SUPPOSITORY, RECTAL RECTAL EVERY 12 HOURS PRN
Status: DISCONTINUED | OUTPATIENT
Start: 2020-04-27 | End: 2020-04-29 | Stop reason: HOSPADM

## 2020-04-27 RX ORDER — IOPAMIDOL 755 MG/ML
99 INJECTION, SOLUTION INTRAVASCULAR ONCE
Status: COMPLETED | OUTPATIENT
Start: 2020-04-27 | End: 2020-04-27

## 2020-04-27 RX ORDER — NICOTINE 21 MG/24HR
1 PATCH, TRANSDERMAL 24 HOURS TRANSDERMAL DAILY
Status: DISCONTINUED | OUTPATIENT
Start: 2020-04-27 | End: 2020-04-27

## 2020-04-27 RX ORDER — INSULIN GLARGINE 100 [IU]/ML
30 INJECTION, SOLUTION SUBCUTANEOUS 2 TIMES DAILY
COMMUNITY
End: 2020-06-10

## 2020-04-27 RX ORDER — FAMOTIDINE 20 MG/1
20 TABLET, FILM COATED ORAL 2 TIMES DAILY
Status: DISCONTINUED | OUTPATIENT
Start: 2020-04-27 | End: 2020-04-29 | Stop reason: HOSPADM

## 2020-04-27 RX ORDER — BISACODYL 5 MG
5 TABLET, DELAYED RELEASE (ENTERIC COATED) ORAL ONCE
Status: COMPLETED | OUTPATIENT
Start: 2020-04-27 | End: 2020-04-27

## 2020-04-27 RX ORDER — ONDANSETRON 2 MG/ML
4 INJECTION INTRAMUSCULAR; INTRAVENOUS EVERY 6 HOURS PRN
Status: DISCONTINUED | OUTPATIENT
Start: 2020-04-27 | End: 2020-04-29 | Stop reason: HOSPADM

## 2020-04-27 RX ORDER — KETOROLAC TROMETHAMINE 15 MG/ML
15-30 INJECTION, SOLUTION INTRAMUSCULAR; INTRAVENOUS EVERY 6 HOURS PRN
Status: DISCONTINUED | OUTPATIENT
Start: 2020-04-27 | End: 2020-04-29 | Stop reason: HOSPADM

## 2020-04-27 RX ORDER — SODIUM CHLORIDE 9 MG/ML
1000 INJECTION, SOLUTION INTRAVENOUS CONTINUOUS
Status: DISCONTINUED | OUTPATIENT
Start: 2020-04-27 | End: 2020-04-29 | Stop reason: HOSPADM

## 2020-04-27 RX ORDER — HYDROMORPHONE HYDROCHLORIDE 1 MG/ML
0.5 INJECTION, SOLUTION INTRAMUSCULAR; INTRAVENOUS; SUBCUTANEOUS EVERY 6 HOURS PRN
Status: DISCONTINUED | OUTPATIENT
Start: 2020-04-27 | End: 2020-04-27

## 2020-04-27 RX ORDER — DEXTROSE MONOHYDRATE 25 G/50ML
25-50 INJECTION, SOLUTION INTRAVENOUS
Status: DISCONTINUED | OUTPATIENT
Start: 2020-04-27 | End: 2020-04-29 | Stop reason: HOSPADM

## 2020-04-27 RX ORDER — NICOTINE POLACRILEX 4 MG
15-30 LOZENGE BUCCAL
Status: DISCONTINUED | OUTPATIENT
Start: 2020-04-27 | End: 2020-04-29 | Stop reason: HOSPADM

## 2020-04-27 RX ADMIN — FAMOTIDINE 20 MG: 20 TABLET ORAL at 20:59

## 2020-04-27 RX ADMIN — ONDANSETRON 4 MG: 2 INJECTION INTRAMUSCULAR; INTRAVENOUS at 15:44

## 2020-04-27 RX ADMIN — SODIUM CHLORIDE: 9 INJECTION, SOLUTION INTRAVENOUS at 22:34

## 2020-04-27 RX ADMIN — HYDROMORPHONE HYDROCHLORIDE 0.5 MG: 1 INJECTION, SOLUTION INTRAMUSCULAR; INTRAVENOUS; SUBCUTANEOUS at 18:23

## 2020-04-27 RX ADMIN — HYDROMORPHONE HYDROCHLORIDE 0.5 MG: 1 INJECTION, SOLUTION INTRAMUSCULAR; INTRAVENOUS; SUBCUTANEOUS at 15:44

## 2020-04-27 RX ADMIN — IOPAMIDOL 99 ML: 755 INJECTION, SOLUTION INTRAVENOUS at 16:03

## 2020-04-27 RX ADMIN — INSULIN ASPART 1 UNITS: 100 INJECTION, SOLUTION INTRAVENOUS; SUBCUTANEOUS at 23:56

## 2020-04-27 RX ADMIN — ENOXAPARIN SODIUM 40 MG: 40 INJECTION SUBCUTANEOUS at 20:59

## 2020-04-27 RX ADMIN — LIDOCAINE HYDROCHLORIDE 30 ML: 20 SOLUTION ORAL; TOPICAL at 15:44

## 2020-04-27 RX ADMIN — POTASSIUM CHLORIDE 10 MEQ: 7.46 INJECTION, SOLUTION INTRAVENOUS at 18:40

## 2020-04-27 RX ADMIN — SODIUM CHLORIDE 1000 ML: 9 INJECTION, SOLUTION INTRAVENOUS at 15:52

## 2020-04-27 RX ADMIN — INSULIN ASPART 1 UNITS: 100 INJECTION, SOLUTION INTRAVENOUS; SUBCUTANEOUS at 20:59

## 2020-04-27 RX ADMIN — HYDROMORPHONE HYDROCHLORIDE 0.5 MG: 1 INJECTION, SOLUTION INTRAMUSCULAR; INTRAVENOUS; SUBCUTANEOUS at 20:31

## 2020-04-27 RX ADMIN — BISACODYL 5 MG: 5 TABLET, COATED ORAL at 23:50

## 2020-04-27 RX ADMIN — DOCUSATE SODIUM 286 ML: 50 LIQUID ORAL at 22:53

## 2020-04-27 ASSESSMENT — ACTIVITIES OF DAILY LIVING (ADL)
BATHING: 0-->INDEPENDENT
TRANSFERRING: 0-->INDEPENDENT
FALL_HISTORY_WITHIN_LAST_SIX_MONTHS: NO
AMBULATION: 0-->INDEPENDENT
SWALLOWING: 0-->SWALLOWS FOODS/LIQUIDS WITHOUT DIFFICULTY
TOILETING: 0-->INDEPENDENT
COGNITION: 0 - NO COGNITION ISSUES REPORTED
RETIRED_EATING: 0-->INDEPENDENT
DRESS: 0-->INDEPENDENT
RETIRED_COMMUNICATION: 0-->UNDERSTANDS/COMMUNICATES WITHOUT DIFFICULTY

## 2020-04-27 ASSESSMENT — MIFFLIN-ST. JEOR: SCORE: 1476.76

## 2020-04-27 NOTE — ED TRIAGE NOTES
Pt arrives to triage with complaints of abdominal pain and nausea. Pt reports he believes he has a bowel obstruction. Reports BM last evening. Denies any changes in urination. A&O, VSS

## 2020-04-27 NOTE — ED PROVIDER NOTES
[place] EMERGENCY DEPARTMENT ([facility])  4/27/20  History     Chief Complaint   Patient presents with     Abdominal Pain     The history is provided by the patient and medical records.     Andrew Lockwood is a 52 year old male with a past medical history of HIV compliant with antiretrovirals, GERD, type 2 diabetes mellitus, CNS toxoplasmosis, and recurrent small bowel obstructions who presents to the Emergency Department for evaluation of abdominal pain.  Patient states that he started to develop central abdominal pain last night that has worsened this morning.  Patient states that this feels similar to his abdominal pains when he has bowel obstructions in the past.  Patient did feel constipated last night but was able to pass a small BM.  Patient states this was nonbloody. No melena. Patient currently takes medications as prescribed for his HIV and GERD.  Patient states he has not eaten today.  He denies any fevers, cough, chest pain, shortness of breath, or other URI symptoms. No sore throat, myalgias, loss of taste or smell, rhinorrhea. Patient is currently staying in a sober house, and denies any sick contacts or known exposure to COVID 19.  Patient has also been monitoring blood sugars, and states they were in the 200s yesterday.  Patient states that he ran out of his short acting insulin yesterday, but has been taking his long-acting as directed.  He denies checking his blood sugars today.  Patient currently denies any dysuria, or hematuria.  No other symptoms noted.    Past Medical History  Past Medical History:   Diagnosis Date     AIDS (H)      Allergic rhinitis due to other allergen     DNS     Anal dysplasia      Chronic abdominal pain      CNS toxoplasmosis (H)      Diabetes type 2, controlled (H)      GERD (gastroesophageal reflux disease)      HIV (human immunodeficiency virus infection) (H)      Periungual wart      Sleep apnea     doesn't use CPAP     Past Surgical History:   Procedure  Laterality Date     C NONSPECIFIC PROCEDURE      right forearm fracture     COLONOSCOPY Left 1/22/2016    Procedure: COMBINED COLONOSCOPY, SINGLE OR MULTIPLE BIOPSY/POLYPECTOMY BY BIOPSY;  Surgeon: Clark Saini MD;  Location: [facility] GI     HC EXPLORE UNDESC TESTIS,INGUIN/SCROTAL       LAPAROSCOPIC APPENDECTOMY N/A 1/31/2018    Procedure: LAPAROSCOPIC APPENDECTOMY;  LAPAROSCOPIC APPENDECTOMY;  Surgeon: Dawn Holt MD;  Location: [facility] OR     LAPAROSCOPY DIAGNOSTIC (GENERAL) N/A 7/26/2016    Procedure: LAPAROSCOPY DIAGNOSTIC (GENERAL);  Surgeon: Susannah Arriaga MD;  Location: [facility] OR     LAPAROSCOPY DIAGNOSTIC (GENERAL) N/A 4/16/2018    Procedure: LAPAROSCOPY DIAGNOSTIC (GENERAL);  Diagnostic laparoscopy and lysis of adhesions;  Surgeon: Prince Dowling MD;  Location: [facility] OR     OPTICAL TRACKING SYSTEM CRANIOTOMY, EXCISE TUMOR, COMBINED Left 4/10/2015    Procedure: COMBINED OPTICAL TRACKING SYSTEM CRANIOTOMY, EXCISE TUMOR;  Surgeon: Mirlande Colmenares MD;  Location: [facility] OR     REPAIR GAMEKEEPER'S THUMB Right 12/2/2016    Procedure: REPAIR LIGAMENT ULNAR COLLATERAL THUMB (GAMEKEEPER'S);  Surgeon: Evin Zamorano MD;  Location: [facility] OR     bictegravir-emtricitabine-tenofovir (BIKTARVY) -25 MG per tablet  blood glucose (NO BRAND SPECIFIED) lancets standard  blood glucose (NO BRAND SPECIFIED) test strip  blood glucose monitoring (NO BRAND SPECIFIED) meter device kit  Ciclopirox 8 % KIT  famotidine (PEPCID) 20 MG tablet  famotidine (PEPCID) 20 MG tablet  HUMALOG KWIKPEN 100 UNIT/ML soln  ibuprofen (ADVIL/MOTRIN) 200 MG tablet  insulin glargine (LANTUS PEN) 100 UNIT/ML pen  insulin pen needle (B-D U/F) 31G X 8 MM miscellaneous  insulin pen needle (BD PEN NEEDLE SCOTT 2ND GEN) 32G X 4 MM miscellaneous  nicotine 21 MG/24HR TD 24 hr patch  NOVOLOG FLEXPEN 100 UNIT/ML soln  Nutritional Supplements (GLUCOSE MANAGEMENT) TABS  ondansetron (ZOFRAN-ODT) 4 MG ODT tab  polyethylene glycol (MIRALAX)  "powder  STATIN NOT PRESCRIBED (INTENTIONAL)  Tavaborole 5 % EX topical solution  terbinafine 250 MG PO tablet      Allergies   Allergen Reactions     Metformin      Abdominal pain     Milk [Lac Bovis] Hives     Dulaglutide Rash     Past medical history, past surgical history, medications, and allergies were reviewed with the patient. Additional pertinent items: None    Family History  Family History   Problem Relation Age of Onset     Diabetes Brother      Diabetes Father      Alzheimer Disease Father      Unknown/Adopted Mother      Diabetes Paternal Grandfather      Cancer No family hx of         no skin cancer     Skin Cancer No family hx of         no famiy hx of skin cancer     Glaucoma No family hx of      Macular Degeneration No family hx of      Family history was reviewed with the patient. Additional pertinent items: None    Social History  Social History     Tobacco Use     Smoking status: Current Some Day Smoker     Packs/day: 0.25     Years: 40.00     Pack years: 10.00     Types: Cigarettes     Last attempt to quit: 10/28/2016     Years since quitting: 3.4     Smokeless tobacco: Former User   Substance Use Topics     Alcohol use: No     Alcohol/week: 0.0 standard drinks     Comment: Last etoh in 2007     Drug use: Not Currently     Types: Marijuana, Methamphetamines     Comment: Sober 120 days on 2/28/2020      Social history was reviewed with the patient. Additional pertinent items: None    Review of Systems  Pertinent positives and negatives are documented in the HPI. All other systems reviewed and are negative.  Physical Exam   BP: 130/89  Pulse: 108  Temp: 98  F (36.7  C)  Resp: 16  Height: 162.6 cm (5' 4\")  Weight: 72.6 kg (160 lb)  SpO2: 100 %  Physical Exam  Vitals signs reviewed.   Constitutional:       General: He is not in acute distress.     Appearance: He is well-developed.   HENT:      Head: Normocephalic and atraumatic.      Nose: Nose normal.      Mouth/Throat:      Mouth: Mucous membranes " are moist.      Pharynx: No oropharyngeal exudate.   Eyes:      Extraocular Movements: Extraocular movements intact.      Conjunctiva/sclera: Conjunctivae normal.      Pupils: Pupils are equal, round, and reactive to light.   Neck:      Musculoskeletal: Normal range of motion and neck supple.   Cardiovascular:      Rate and Rhythm: Normal rate and regular rhythm.      Pulses: Normal pulses.      Heart sounds: Normal heart sounds. No murmur.   Pulmonary:      Effort: Pulmonary effort is normal. No respiratory distress.      Breath sounds: Normal breath sounds. No stridor. No wheezing or rales.   Abdominal:      General: Bowel sounds are normal. There is no distension.      Palpations: Abdomen is soft. There is no mass.      Tenderness: There is abdominal tenderness. There is no right CVA tenderness, left CVA tenderness, guarding or rebound.      Comments: Mid abdominal tenderness to palpation   Musculoskeletal: Normal range of motion.         General: No tenderness.   Skin:     General: Skin is warm and dry.      Capillary Refill: Capillary refill takes less than 2 seconds.      Findings: No rash.   Neurological:      General: No focal deficit present.      Mental Status: He is alert and oriented to person, place, and time.      GCS: GCS eye subscore is 4. GCS verbal subscore is 5. GCS motor subscore is 6.      Cranial Nerves: No cranial nerve deficit.      Sensory: No sensory deficit.      Motor: No weakness or abnormal muscle tone.   Psychiatric:         Mood and Affect: Mood normal.         ED Course   3:20 PM  The patient was seen and examined by Mirian Perez MD in Room ED23.     Procedures                         No results found for any visits on 04/27/20.  Medications   0.9% sodium chloride BOLUS (has no administration in time range)     Followed by   sodium chloride 0.9% infusion (has no administration in time range)        Assessments & Plan (with Medical Decision Making)   On exam, patient is overall  well-appearing in no acute distress.  He does have a tenderness to palpation in the mid abdomen without signs of peritonitis.  With his presentation we did consider differential diagnosis including but not limited to bowel obstruction, pancreatitis, gastritis, diverticulitis.  Laboratory studies as well as CT of the abdomen and pelvis was obtained.  Blood sugar is currently 209 and he does not have any signs of DKA with normal bicarb and normal pH.  Urinalysis is unremarkable.  Laboratory studies are largely unremarkable.  Lactic acid was within normal limits.  He was given IV fluids as well as Dilaudid and Zofran.  He was also given a GI cocktail.  He was mildly tachycardic upon arrival but this resolved spontaneously.  The remainder of his vital signs are within normal limits and he is afebrile.  CT did show Dilation and equalization of small bowel loop within the mid abdomen concerning for Developing small bowel obstruction.  I contacted general surgery and they recommended NG tube placement.  This was confirmed by x-ray.  They recommended admission to the medicine service.  Patient was in agreement with this plan.  He was admitted to the floor in stable condition.    I have reviewed the nursing notes. I have reviewed the findings, diagnosis, plan and need for follow up with the patient.    New Prescriptions    No medications on file       Final diagnoses:   Partial small bowel obstruction (H)   IRuel, am serving as a trained medical scribe to document services personally performed by Mirian Perez MD, based on the provider's statements to me.      Mirian KELLY MD, was physically present and have reviewed and verified the accuracy of this note documented by Ruel Vasquez.      Emergency Medicine   [facility], [place], EMERGENCY DEPARTMENT  4/27/2020     Mirian Perez MD  05/21/20 [id number]

## 2020-04-27 NOTE — LETTER
Transition Communication Hand-off for Care Transitions to Next Level of Care Provider    Name: Andrew Lockwood  : 1967  MRN #: [id number]  Primary Care Provider: Caitie Lamb     Primary Clinic: [address] [facility] [facility] [address]  [facility] [address]     Reason for Hospitalization:  Partial small bowel obstruction (H) [K56.600]  Admit Date/Time: 2020  3:05 PM  Discharge Date: 20  Payor Source: Payor: BCBS / Plan: BCBS INDIVIDUAL / Product Type: Indemnity /          Reason for Communication Hand-off Referral: Other D/c back to sober living facility    Discharge Plan:  Patient Name:  Andrew Lockwood     Anticipated Discharge Date:  20    Discharge Disposition:   Other:  [facility]   [address] [place] [id number]  P. [phone]  F. [phone]     Concern for non-adherence with plan of care:   Y/N Unknown  Discharge Needs Assessment:    Follow-up specialty is recommended: Unknown    Follow-up plan:  No future appointments.    Any outstanding tests or procedures:              RADHA Valera, [address]   [phone] (pager) [phone]  2020

## 2020-04-28 ENCOUNTER — TELEPHONE (OUTPATIENT)
Dept: ENDOCRINOLOGY | Facility: CLINIC | Age: 55
End: 2020-04-28

## 2020-04-28 ENCOUNTER — MEDICAL CORRESPONDENCE (OUTPATIENT)
Dept: HEALTH INFORMATION MANAGEMENT | Facility: CLINIC | Age: 55
End: 2020-04-28

## 2020-04-28 LAB
ALBUMIN SERPL-MCNC: 3.3 G/DL (ref 3.4–5)
ALP SERPL-CCNC: 76 U/L (ref 40–150)
ALT SERPL W P-5'-P-CCNC: 20 U/L (ref 0–70)
ANION GAP SERPL CALCULATED.3IONS-SCNC: 6 MMOL/L (ref 3–14)
AST SERPL W P-5'-P-CCNC: 14 U/L (ref 0–45)
BILIRUB SERPL-MCNC: 1 MG/DL (ref 0.2–1.3)
BUN SERPL-MCNC: 15 MG/DL (ref 7–30)
CALCIUM SERPL-MCNC: 8.5 MG/DL (ref 8.5–10.1)
CHLORIDE SERPL-SCNC: 106 MMOL/L (ref 94–109)
CO2 SERPL-SCNC: 28 MMOL/L (ref 20–32)
CREAT SERPL-MCNC: 0.76 MG/DL (ref 0.66–1.25)
ERYTHROCYTE [DISTWIDTH] IN BLOOD BY AUTOMATED COUNT: 12.7 % (ref 10–15)
GFR SERPL CREATININE-BSD FRML MDRD: >90 ML/MIN/{1.73_M2}
GLUCOSE BLDC GLUCOMTR-MCNC: 124 MG/DL (ref 70–99)
GLUCOSE BLDC GLUCOMTR-MCNC: 150 MG/DL (ref 70–99)
GLUCOSE BLDC GLUCOMTR-MCNC: 150 MG/DL (ref 70–99)
GLUCOSE BLDC GLUCOMTR-MCNC: 151 MG/DL (ref 70–99)
GLUCOSE BLDC GLUCOMTR-MCNC: 159 MG/DL (ref 70–99)
GLUCOSE BLDC GLUCOMTR-MCNC: 160 MG/DL (ref 70–99)
GLUCOSE BLDC GLUCOMTR-MCNC: 232 MG/DL (ref 70–99)
GLUCOSE SERPL-MCNC: 148 MG/DL (ref 70–99)
HCT VFR BLD AUTO: 38.8 % (ref 40–53)
HGB BLD-MCNC: 13.2 G/DL (ref 13.3–17.7)
MCH RBC QN AUTO: 32.4 PG (ref 26.5–33)
MCHC RBC AUTO-ENTMCNC: 34 G/DL (ref 31.5–36.5)
MCV RBC AUTO: 95 FL (ref 78–100)
PLATELET # BLD AUTO: 320 10E9/L (ref 150–450)
POTASSIUM SERPL-SCNC: 3.4 MMOL/L (ref 3.4–5.3)
PROT SERPL-MCNC: 6.6 G/DL (ref 6.8–8.8)
RBC # BLD AUTO: 4.07 10E12/L (ref 4.4–5.9)
SODIUM SERPL-SCNC: 140 MMOL/L (ref 133–144)
WBC # BLD AUTO: 7.6 10E9/L (ref 4–11)

## 2020-04-28 PROCEDURE — 85027 COMPLETE CBC AUTOMATED: CPT | Performed by: STUDENT IN AN ORGANIZED HEALTH CARE EDUCATION/TRAINING PROGRAM

## 2020-04-28 PROCEDURE — 25000125 ZZHC RX 250: Performed by: STUDENT IN AN ORGANIZED HEALTH CARE EDUCATION/TRAINING PROGRAM

## 2020-04-28 PROCEDURE — 00000146 ZZHCL STATISTIC GLUCOSE BY METER IP

## 2020-04-28 PROCEDURE — 80053 COMPREHEN METABOLIC PANEL: CPT | Performed by: STUDENT IN AN ORGANIZED HEALTH CARE EDUCATION/TRAINING PROGRAM

## 2020-04-28 PROCEDURE — 99207 ZZC CDG-HISTORY COMP: MEETS EXP. PROBLEM FOCUSED - DOWN CODED LACK OF HPI: CPT | Performed by: INTERNAL MEDICINE

## 2020-04-28 PROCEDURE — 40000141 ZZH STATISTIC PERIPHERAL IV START W/O US GUIDANCE

## 2020-04-28 PROCEDURE — 99233 SBSQ HOSP IP/OBS HIGH 50: CPT | Mod: GC | Performed by: INTERNAL MEDICINE

## 2020-04-28 PROCEDURE — 36415 COLL VENOUS BLD VENIPUNCTURE: CPT | Performed by: STUDENT IN AN ORGANIZED HEALTH CARE EDUCATION/TRAINING PROGRAM

## 2020-04-28 PROCEDURE — 12000001 ZZH R&B MED SURG/OB UMMC

## 2020-04-28 PROCEDURE — 25000132 ZZH RX MED GY IP 250 OP 250 PS 637: Performed by: STUDENT IN AN ORGANIZED HEALTH CARE EDUCATION/TRAINING PROGRAM

## 2020-04-28 PROCEDURE — 25000128 H RX IP 250 OP 636: Performed by: STUDENT IN AN ORGANIZED HEALTH CARE EDUCATION/TRAINING PROGRAM

## 2020-04-28 RX ORDER — BISACODYL 10 MG
10 SUPPOSITORY, RECTAL RECTAL ONCE
Status: COMPLETED | OUTPATIENT
Start: 2020-04-28 | End: 2020-04-28

## 2020-04-28 RX ADMIN — ENOXAPARIN SODIUM 40 MG: 40 INJECTION SUBCUTANEOUS at 20:18

## 2020-04-28 RX ADMIN — DOCUSATE SODIUM 286 ML: 50 LIQUID ORAL at 14:35

## 2020-04-28 RX ADMIN — KETOROLAC TROMETHAMINE 15 MG: 15 INJECTION, SOLUTION INTRAMUSCULAR; INTRAVENOUS at 00:09

## 2020-04-28 RX ADMIN — FAMOTIDINE 20 MG: 20 TABLET ORAL at 07:51

## 2020-04-28 RX ADMIN — POLYETHYLENE GLYCOL 3350 17 G: 17 POWDER, FOR SOLUTION ORAL at 07:50

## 2020-04-28 RX ADMIN — KETOROLAC TROMETHAMINE 30 MG: 15 INJECTION, SOLUTION INTRAMUSCULAR; INTRAVENOUS at 15:10

## 2020-04-28 RX ADMIN — KETOROLAC TROMETHAMINE 15 MG: 15 INJECTION, SOLUTION INTRAMUSCULAR; INTRAVENOUS at 08:53

## 2020-04-28 RX ADMIN — BISACODYL 10 MG: 10 SUPPOSITORY RECTAL at 08:53

## 2020-04-28 RX ADMIN — FAMOTIDINE 20 MG: 20 TABLET ORAL at 20:18

## 2020-04-28 RX ADMIN — LIDOCAINE HYDROCHLORIDE 30 ML: 20 SOLUTION ORAL; TOPICAL at 04:39

## 2020-04-28 RX ADMIN — INSULIN ASPART 2 UNITS: 100 INJECTION, SOLUTION INTRAVENOUS; SUBCUTANEOUS at 23:55

## 2020-04-28 RX ADMIN — POLYETHYLENE GLYCOL 3350 17 G: 17 POWDER, FOR SOLUTION ORAL at 20:18

## 2020-04-28 RX ADMIN — BICTEGRAVIR SODIUM, EMTRICITABINE, AND TENOFOVIR ALAFENAMIDE FUMARATE 1 TABLET: 50; 200; 25 TABLET ORAL at 07:51

## 2020-04-28 RX ADMIN — LIDOCAINE HYDROCHLORIDE 30 ML: 20 SOLUTION ORAL; TOPICAL at 23:55

## 2020-04-28 RX ADMIN — BISACODYL 10 MG: 10 SUPPOSITORY RECTAL at 17:46

## 2020-04-28 RX ADMIN — POLYETHYLENE GLYCOL 3350 17 G: 17 POWDER, FOR SOLUTION ORAL at 15:10

## 2020-04-28 ASSESSMENT — ACTIVITIES OF DAILY LIVING (ADL)
ADLS_ACUITY_SCORE: 12

## 2020-04-28 ASSESSMENT — PAIN DESCRIPTION - DESCRIPTORS: DESCRIPTORS: CRAMPING

## 2020-04-28 NOTE — PROGRESS NOTES
SPIRITUAL HEALTH SERVICES  SPIRITUAL ASSESSMENT Progress Note  [facility]B     REFERRAL SOURCE: Follow up from previous hospital admission.     Pt. Andrew was unavailable during my attempt to conduct a televist with him. His bedside nurse asked me to call back in two hours at his request. I was unable to connect with him.     PLAN: I will make another attempt to reach Andrew at a later time/date. [facility] is available to Andrew per request.     Clari Aldrich  Chaplain Resident  Pager [phone]

## 2020-04-28 NOTE — PHARMACY-ADMISSION MEDICATION HISTORY
Admission Medication History status for the 2020 admission is complete.  See EPIC admission navigator for Prior to Admission medications.  Patient's Name: Andrew Lockwood  Patient's : 1967   52 year old, male    Medication History Sources: Patient via phone for infection prevention, Care Everywhere  Primary Pharmacy: [address]    Medication history source reliability: Good  Medication adherence:  Good    Changes made to PTA medication list (reason)  Added: None  Deleted: nicotine patch, Novolog (changed to Humalog), ondansetron, Miralax, Tavaborole 5% topical solution, terbinafine   Changed:    Lantus was not covered so switched to Basaglar   Patient reports taking differently: Famotidine - 10 mg twice daily instead of 20 mg twice daily    High Risk Medications (Warfarin, Immunosuppressants, Insulin, Antibiotics, or Other)    Basaglar: 30 units twice daily - Patient was not able to take yesterday because he did not have needles    Humalo-7 units 3 times daily with meals per sliding scale - did not take yesterday because he ran out    Additional medication history information (including actions taken by pharmacist):    Patient does not know his sliding scale but has it written out and follows it at home.    Time spent in this activity: 20 minutes    Medication history completed by:  Maranda Ellis, PD2 Pharmacy Intern    Prior to Admission Medications   Prescriptions Last Dose Informant Patient Reported? Taking?   Ciclopirox 8 % KIT  Self No No   Sig: Externally apply 1 Application topically daily In evening, remove from nails once weekly with alcohol   HUMALOG KWIKPEN 100 UNIT/ML soln 2020 at Unknown time Self No Yes   Sig: INJECT 1-7 UNITS UNDER THE SKIN THREE TIMES A DAY BEFORE MEALS, Disp-3 mL   Nutritional Supplements (GLUCOSE MANAGEMENT) TABS PRN Self No Yes   Sig: Use 1-2 tabs for hypoglycemia.   STATIN NOT PRESCRIBED  (INTENTIONAL)  Self No No   Sig: Please choose reason not prescribed, below   bictegravir-emtricitabine-tenofovir (BIKTARVY) -25 MG per tablet 2020 at Unknown time Self No Yes   Sig: Take 1 tablet by mouth daily   blood glucose (NO BRAND SPECIFIED) lancets standard  Self No No   Sig: Use to test blood sugar four times daily or as directed.   blood glucose (NO BRAND SPECIFIED) test strip  Self No No   Si strip by In Vitro route 4 times daily (before meals and nightly) Use to test blood sugars 4 times daily or as directed   blood glucose monitoring (NO BRAND SPECIFIED) meter device kit  Self No No   Sig: Use to test blood sugar 4 times daily or as directed.   famotidine (PEPCID) 20 MG tablet 4/27/2020 x1 at Unknown time Self No Yes   Sig: Take 1 tablet (20 mg) by mouth 2 times daily   Patient taking differently: Take 10 mg by mouth 2 times daily    ibuprofen (ADVIL/MOTRIN) 200 MG tablet Past Month at Unknown time Self Yes Yes   Sig: Take 600 mg by mouth every 4 hours as needed for mild pain   insulin glargine (BASAGLAR KWIKPEN) 100 UNIT/ML pen 2020 Self Yes Yes   Sig: Inject 30 Units Subcutaneous 2 times daily   insulin pen needle (B-D U/F) 31G X 8 MM miscellaneous  Self No No   Sig: Use 1 pen needles daily or as directed.   insulin pen needle (BD PEN NEEDLE SCOTT 2ND GEN) 32G X 4 MM miscellaneous  Self No No   Sig: Use 3-4 daily.   polyethylene glycol (MIRALAX) powder More than a month at Unknown time Self No No   Sig: Take 17 g (1 capful) by mouth daily as needed for constipation      Facility-Administered Medications: None

## 2020-04-28 NOTE — PROVIDER NOTIFICATION
Kimberly nurse stated that pt removed NG tube on his own, had not had time to paged MD.  I paged MD HA and to let me know what I should do further. No return page.

## 2020-04-28 NOTE — H&P
Medicine History and Physical  Department of Internal Medicine    Patient Name: Andrew Lockwood MRN# [id number]   Age: 52 year old YOB: 1967     Date of Admission:4/27/2020    Primary care provider: Caitie Lamb  Date of Service: 4/28/2020  Admitting Team: romaine night swing         Assessment and Plan:   53 yo M w/ pmhx HIV, CNS toxo, DM2, GERD, MDD, insomnia,  MRSA, recurrent SBOs who presents with abdominal pain c/f constipation vs SBO.    # Abdominal pain c/f constipation vs SBO  # Hx recurrent SBO  States his last flatus and BM was the night prior to admission that was small and hard. Started having 11/10 epigastric pain at 3:30 AM the day of admission. States that has had >10 SBO since 2016 and that underwent exploratory lap which did not reveal an etiology for his SBO. Other intra-abd instrumentation also includes appendectomy. CT shows some dilation and very large stool burden. No fevers, leukocytosis, or peritonitis. Pain 0/10 s/p IV dilaudid in ED. Previously had epigastric pain.  Patient is vitally and clinically stable. Suspect that may have constipation > SBO this presentation. Will try laxatives and enema tonight and reassess in AM.   - pink lady enema  - PEG  - ok to discontinue NG tube  - NPO  - IV toradol 15-30 mg q6hr for pain (avoid agents that reduced gut motility such as opioids)  - nausea: PRN zofran and prochloperazine ([id number])    #Tachycardia  Mild tachy to 108 on presentation. Likely pain vs dehydration. Resolved s/p pain meds and fluids.     --Chronic Medical Problems--  #DM2: hold PTA glargine, med sliding scale insulin, hypoglycemia protocol.  #GERD: cont PTA famotidine  #AIDS: cont PTA bictegravir-emtricitabine-tenofivir  #Current smoker: decline nicotine patch    CODE: FULL  Diet/IVF: NPO, maint IVF while NPO  DVT ppx: enoxaparin ppx  Disposition/Admission Status: admit to medicine    Fatoumata Cee MD PGY-1  Internal Medicine-Dermatology  Pager:  [phone]           Chief Complaint:   Abdominal pain           HPI:   51 yo M w/ pmhx HIV, CNS toxo, DM2, GERD, MDD, insomnia,  MRSA, recurrent SBO who presents with abdominal pain. States his last flatus and BM was last night. BM was hard. Started having 11/10 epigastric pain at 3:30 AM the day of admission. Pain feels like a \"punch in the gut\", does not radiate, is constant, improved to 0/10 s/p IV dilaudid in ED. Also w/ nausea. No vomiting. States that has had >10 SBO since 2016 and that underwent exploratory lap which did not reveal an etiology for his SBO. Other intra-abd instrumentation also includes appendectomy.     Additional ROS: denies fever, chills, vomiting, diarrhea, urinary changes, SOB, chest pain, palpitations. Occasionall has BRBPR with constipation. States has colon polyps that he was told need to be removed, but has not followed up for this.     ED course:  -VS: AF, VSS other than initially mildly tachy  - labs: CBC and BMP wnl  - imaging/procedures: CT with dilation and fecalization of small bowel loop within the mid  abdomen, concerning for small bowel obstruction. Diverticulosis without CT evidence for acute diverticulitis.  - management: IV dilaudid for pain, zofran for nausea, NG tube inserted, endorsed to medicine. Epigastric pain 0/10 s/p pain meds.          Past Medical History:     Past Medical History:   Diagnosis Date     AIDS (H)      Allergic rhinitis due to other allergen     DNS     Anal dysplasia      Chronic abdominal pain      CNS toxoplasmosis (H)      Diabetes type 2, controlled (H)      GERD (gastroesophageal reflux disease)      HIV (human immunodeficiency virus infection) (H)      Periungual wart      Sleep apnea     doesn't use CPAP            Past Surgical History:     Past Surgical History:   Procedure Laterality Date     C NONSPECIFIC PROCEDURE      right forearm fracture     COLONOSCOPY Left 1/22/2016    Procedure: COMBINED COLONOSCOPY, SINGLE OR MULTIPLE " BIOPSY/POLYPECTOMY BY BIOPSY;  Surgeon: Clark Saini MD;  Location: [facility]     HC EXPLORE UNDESC TESTIS,INGUIN/SCROTAL       LAPAROSCOPIC APPENDECTOMY N/A 1/31/2018    Procedure: LAPAROSCOPIC APPENDECTOMY;  LAPAROSCOPIC APPENDECTOMY;  Surgeon: Dawn Holt MD;  Location: [facility]     LAPAROSCOPY DIAGNOSTIC (GENERAL) N/A 7/26/2016    Procedure: LAPAROSCOPY DIAGNOSTIC (GENERAL);  Surgeon: Susannah Arriaga MD;  Location: [facility]     LAPAROSCOPY DIAGNOSTIC (GENERAL) N/A 4/16/2018    Procedure: LAPAROSCOPY DIAGNOSTIC (GENERAL);  Diagnostic laparoscopy and lysis of adhesions;  Surgeon: Prince Dowling MD;  Location: [facility]     OPTICAL TRACKING SYSTEM CRANIOTOMY, EXCISE TUMOR, COMBINED Left 4/10/2015    Procedure: COMBINED OPTICAL TRACKING SYSTEM CRANIOTOMY, EXCISE TUMOR;  Surgeon: Mirlande Colmenares MD;  Location: [facility]     REPAIR GAMEKEEPER'S THUMB Right 12/2/2016    Procedure: REPAIR LIGAMENT ULNAR COLLATERAL THUMB (GAMEKEEPER'S);  Surgeon: Evin Zamorano MD;  Location: [facility]              Social History:   Currently lives in sober house    Social History     Socioeconomic History     Marital status: Single     Spouse name: Not on file     Number of children: Not on file     Years of education: Not on file     Highest education level: Not on file   Occupational History     Occupation:      Comment: 5 years; temp agency     Occupation: Unemployed   Social Needs     Financial resource strain: Not on file     Food insecurity     Worry: Not on file     Inability: Not on file     Transportation needs     Medical: Not on file     Non-medical: Not on file   Tobacco Use     Smoking status: Current Some Day Smoker     Packs/day: 0.25     Years: 40.00     Pack years: 10.00     Types: Cigarettes     Last attempt to quit: 10/28/2016     Years since quitting: 3.5     Smokeless tobacco: Former User   Substance and Sexual Activity     Alcohol use: No     Alcohol/week: 0.0 standard  drinks     Comment: Last etoh in 2007     Drug use: Not Currently     Types: Marijuana, Methamphetamines     Comment: Sober 120 days on 2/28/2020     Sexual activity: Not Currently     Partners: Female, Male     Comment: Last sexual activity 2008   Lifestyle     Physical activity     Days per week: Not on file     Minutes per session: Not on file     Stress: Not on file   Relationships     Social connections     Talks on phone: Not on file     Gets together: Not on file     Attends [employer] service: Not on file     Active member of club or organization: Not on file     Attends meetings of clubs or organizations: Not on file     Relationship status: Not on file     Intimate partner violence     Fear of current or ex partner: Not on file     Emotionally abused: Not on file     Physically abused: Not on file     Forced sexual activity: Not on file   Other Topics Concern     Parent/sibling w/ CABG, MI or angioplasty before 65F 55M? Not Asked   Social History Narrative    Born in [facility].  Came to the [place] in [place].  Last traveled to visit family in 2008.        1/7/2019 - Homeless. Working with a  at [employer]  trying to get housing. Sexually active with two partners recently using condoms 100% of the time. Smokes occasionally, not for the last 4 weeks.        1/7/2020 at [facility] since 1/1/2020. 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  Care established with ID and endocrine            Family History:     Family History   Problem Relation Age of Onset     Diabetes Brother      Diabetes Father      Alzheimer Disease Father      Unknown/Adopted Mother      Diabetes Paternal Grandfather      Cancer No family hx of         no skin cancer     Skin Cancer No family hx of         no famiy hx of skin cancer     Glaucoma No family hx of      Macular Degeneration No family hx of             Immunizations:     Immunization History   Administered Date(s) Administered     HepB 03/10/2016, 06/16/2016     HepB-Adult 08/31/2017      Influenza (IIV3) PF 10/17/2016     Influenza Quad, Recombinant, p-free (RIV4) 2020     Influenza Vaccine IM > 6 months Valent IIV4 2015, 2017     Mantoux Tuberculin Skin Test 2015     Meningococcal (Menactra ) 2015, 03/10/2016     Pneumo Conj 13-V (2010&after) 03/10/2016     Pneumococcal 23 valent 2016     TDAP Vaccine (Boostrix) 10/17/2016              Allergies:      Allergies   Allergen Reactions     Metformin      Abdominal pain     Milk [Lac Bovis] Hives     Dulaglutide Rash            Medications:     Prior to Admission Medications   Prescriptions Last Dose Informant Patient Reported? Taking?   Ciclopirox 8 % KIT  Self No No   Sig: Externally apply 1 Application topically daily In evening, remove from nails once weekly with alcohol   HUMALOG KWIKPEN 100 UNIT/ML soln 2020 at Unknown time Self No Yes   Sig: INJECT 1-7 UNITS UNDER THE SKIN THREE TIMES A DAY BEFORE MEALS, Disp-3 mL   Nutritional Supplements (GLUCOSE MANAGEMENT) TABS PRN Self No Yes   Sig: Use 1-2 tabs for hypoglycemia.   STATIN NOT PRESCRIBED (INTENTIONAL)  Self No No   Sig: Please choose reason not prescribed, below   bictegravir-emtricitabine-tenofovir (BIKTARVY) -25 MG per tablet 2020 at Unknown time Self No Yes   Sig: Take 1 tablet by mouth daily   blood glucose (NO BRAND SPECIFIED) lancets standard  Self No No   Sig: Use to test blood sugar four times daily or as directed.   blood glucose (NO BRAND SPECIFIED) test strip  Self No No   Si strip by In Vitro route 4 times daily (before meals and nightly) Use to test blood sugars 4 times daily or as directed   blood glucose monitoring (NO BRAND SPECIFIED) meter device kit  Self No No   Sig: Use to test blood sugar 4 times daily or as directed.   famotidine (PEPCID) 20 MG tablet 4/27/2020 x1 at Unknown time Self No Yes   Sig: Take 1 tablet (20 mg) by mouth 2 times daily   Patient taking differently: Take 10 mg by mouth 2 times daily   "  ibuprofen (ADVIL/MOTRIN) 200 MG tablet Past Month at Unknown time Self Yes Yes   Sig: Take 600 mg by mouth every 4 hours as needed for mild pain   insulin glargine (BASAGLAR KWIKPEN) 100 UNIT/ML pen 4/25/2020 Self Yes Yes   Sig: Inject 30 Units Subcutaneous 2 times daily   insulin pen needle (B-D U/F) 31G X 8 MM miscellaneous  Self No No   Sig: Use 1 pen needles daily or as directed.   insulin pen needle (BD PEN NEEDLE SCOTT 2ND GEN) 32G X 4 MM miscellaneous  Self No No   Sig: Use 3-4 daily.   polyethylene glycol (MIRALAX) powder More than a month at Unknown time Self No No   Sig: Take 17 g (1 capful) by mouth daily as needed for constipation      Facility-Administered Medications: None             Review of Systems:     A complete ROS was performed and is negative other than what is stated in the HPI.          Physical Exam:   Blood pressure 121/85, pulse 86, temperature 98.7  F (37.1  C), temperature source Oral, resp. rate 16, height 1.626 m (5' 4\"), weight 72.6 kg (160 lb), SpO2 95 %.  General: NAD, A&Ox3, pleasant, smiling  HEENT: PERRL, MMM, NG to low intermitted wall suction suctioning whitish fluid  Chest/Resp: CTAB  Heart/CV: RRR nl s1 s2 no mrg  Abdomen/GI: soft, tender over epigastric region, non distended, unable to hear bowel sounds over sucction  Skin: R arm w/ hyperpigmented nodule c/w prurigo nodule  Extremities/MSK: wwp, no edema  Neuro: no focal deficits         Data:   Labs:  Recent Labs   Lab 04/27/20 2035 04/27/20  1620 04/27/20  1531 04/27/20  1529   WBC  --  6.1  --   --    HGB  --  13.0* 13.6  --    MCV  --  94  --   --     288  --   --    NA  --   --  140 138   POTASSIUM  --   --  3.6 3.5   CHLORIDE  --   --   --  106   CO2  --   --   --  29   BUN  --   --   --  16   CR 0.69  --   --  0.74   ANIONGAP  --   --   --  3   LINDA  --   --   --  9.0   GLC  --   --  213* 209*   ALBUMIN  --   --   --  3.6   PROTTOTAL  --   --   --  7.2   BILITOTAL  --   --   --  0.8   ALKPHOS  --   --   --  " 96   ALT  --   --   --  23   AST  --   --   --  16   LIPASE  --   --   --  151         Micro: none    Procedure/Imaging:  - CT abdomen                                                IMPRESSION:   1.  Dilation and fecalization of small bowel loop within the mid  abdomen, concerning for small bowel obstruction.  2.  Mild diverticulosis without CT evidence for acute diverticulitis.  3.   Prostatomegaly and evidence of chronic bladder narrowing.  4.  Stable appearance of penile prosthesis.    Internal Medicine Staff Addendum  Date of Service: 4/28/2020    I have seen and examined this patient, reviewed the data and discussed the plan of care. I agree with the above documentation including plan and ddx unless otherwise stated:     # fecal obstipation/obstruction. I believe his obstruction is from hard stool, not bowel obstruction due to a mechanical obstruction. Has been having small, hard BM. Has bowel sounds. NG tube ineffective and pulled. No output and no liquid or air to remove. Needs aggressive laxatives and enemas. Poor dietary habits discussed. Minimal to no fiber in his diet. Recommend giving handout of fiber-rich foods on discharge. Consult surgery if worsens or above approach is incorrect. Could replace NG if needed.    Marcio Grijalva MD  Internal Medicine Hospitalist  [facility]  Attending pager: [phone]

## 2020-04-28 NOTE — TELEPHONE ENCOUNTER
Called law firm back to ask that new forms be faxed to Dr Prieto at [phone]. Left direct number in case they have other questions or need anything else from us.  Dalila Wilson RN

## 2020-04-28 NOTE — PLAN OF CARE
"BP (!) 161/78 (BP Location: Right arm)   Pulse 86   Temp 98.7  F (37.1  C) (Oral)   Resp 16   Ht 1.626 m (5' 4\")   Wt 72.6 kg (160 lb)   SpO2 95%   BMI 27.46 kg/m      Reason for admission: SBO, ED admit  Neuro: A&Ox4, cooperative, independent  Cardiac: WDL, denies chest pain  Respiratory: on RA, denies SOB  GI/: voiding, LBM 4/26, enema given  Skin: intact  Pain: dilaudid given x1  LDA: L PIV  Activity: up ad lexi  Diet/Appetite: NPO exept meds  Plan: continue poc    Pt removed NG tube by himself - overnight nurse Mark nod team    "

## 2020-04-28 NOTE — TELEPHONE ENCOUNTER
Health Call Center    Phone Message    May a detailed message be left on voicemail: no     Reason for Call: Other: Primitivo from [facility] called to request a fax detailing parameters regarding the Pt's glucose levels and when to call and report levels or admit to ER. Please fax to [phone]. Please call Primitivo back for any issues/questions.      Action Taken: Message routed to:  [facility]    Travel Screening: Not Applicable

## 2020-04-28 NOTE — PLAN OF CARE
"/85 (BP Location: Right arm)   Pulse 86   Temp 98.7  F (37.1  C) (Oral)   Resp 16   Ht 1.626 m (5' 4\")   Wt 72.6 kg (160 lb)   SpO2 95%   BMI 27.46 kg/m      Reason for admission: recurring SBO  Neuro: A&Ox4, calls appropriately  Cardiac: WDL, no chest pain  Respiratory: WDL, RA, no SOB  GI/: voiding not saving, pink lady enema given before shift, dulcolax & GI cocktail given, LBM: 4/26  Skin: WDL, pt in clothing-declined gown  Pain: given toradol x1, had heartburn  Lines: L PIV removed-IV infiltrated, refused IV access  Drains: previously had NG-pt pulled out, MD notified  Incisions: X  Activity: independent  Diet: NPO  Labs: bg- 151 & 160 (refused correction x1)  Changes/Plan: continue POC    "

## 2020-04-28 NOTE — PROGRESS NOTES
Upper abdominal pain continues, gets some relief from toradol 15mg IVx1. Up independently in room. IVF infusing at 100ml/hour, voiding adequately.  Received dulcolax suppository x1 and did have one formed medium size stool. Abdomen is soft, has hyperactive bowel sounds, says he is not passing flatus.   at 1130, declines insulin coverage. Sleeping in bed most of shift, he wakes easily. States that he sleeps til 1pm at home. Continue with cares, monitor I/O, toradol for pain.

## 2020-04-29 VITALS
SYSTOLIC BLOOD PRESSURE: 130 MMHG | OXYGEN SATURATION: 99 % | BODY MASS INDEX: 27.31 KG/M2 | TEMPERATURE: 96.4 F | RESPIRATION RATE: 18 BRPM | WEIGHT: 160 LBS | HEART RATE: 83 BPM | HEIGHT: 64 IN | DIASTOLIC BLOOD PRESSURE: 78 MMHG

## 2020-04-29 LAB
ANION GAP SERPL CALCULATED.3IONS-SCNC: 6 MMOL/L (ref 3–14)
BUN SERPL-MCNC: 11 MG/DL (ref 7–30)
CALCIUM SERPL-MCNC: 8.6 MG/DL (ref 8.5–10.1)
CHLORIDE SERPL-SCNC: 109 MMOL/L (ref 94–109)
CO2 SERPL-SCNC: 24 MMOL/L (ref 20–32)
CREAT SERPL-MCNC: 0.7 MG/DL (ref 0.66–1.25)
GFR SERPL CREATININE-BSD FRML MDRD: >90 ML/MIN/{1.73_M2}
GLUCOSE BLDC GLUCOMTR-MCNC: 148 MG/DL (ref 70–99)
GLUCOSE BLDC GLUCOMTR-MCNC: 190 MG/DL (ref 70–99)
GLUCOSE SERPL-MCNC: 152 MG/DL (ref 70–99)
POTASSIUM SERPL-SCNC: 3.5 MMOL/L (ref 3.4–5.3)
SODIUM SERPL-SCNC: 139 MMOL/L (ref 133–144)

## 2020-04-29 PROCEDURE — 25000132 ZZH RX MED GY IP 250 OP 250 PS 637: Performed by: STUDENT IN AN ORGANIZED HEALTH CARE EDUCATION/TRAINING PROGRAM

## 2020-04-29 PROCEDURE — 00000146 ZZHCL STATISTIC GLUCOSE BY METER IP

## 2020-04-29 PROCEDURE — 25000128 H RX IP 250 OP 636: Performed by: STUDENT IN AN ORGANIZED HEALTH CARE EDUCATION/TRAINING PROGRAM

## 2020-04-29 PROCEDURE — 99239 HOSP IP/OBS DSCHRG MGMT >30: CPT | Mod: GC | Performed by: INTERNAL MEDICINE

## 2020-04-29 PROCEDURE — 40000141 ZZH STATISTIC PERIPHERAL IV START W/O US GUIDANCE

## 2020-04-29 PROCEDURE — 40001071 ZZH STATISTICAL VASC ACCESS NURSE TIME, 1-15 MINUTES

## 2020-04-29 PROCEDURE — 36415 COLL VENOUS BLD VENIPUNCTURE: CPT | Performed by: INTERNAL MEDICINE

## 2020-04-29 PROCEDURE — 80048 BASIC METABOLIC PNL TOTAL CA: CPT | Performed by: INTERNAL MEDICINE

## 2020-04-29 PROCEDURE — 25000131 ZZH RX MED GY IP 250 OP 636 PS 637: Performed by: STUDENT IN AN ORGANIZED HEALTH CARE EDUCATION/TRAINING PROGRAM

## 2020-04-29 RX ORDER — ACETAMINOPHEN 325 MG/1
650 TABLET ORAL EVERY 6 HOURS PRN
Status: DISCONTINUED | OUTPATIENT
Start: 2020-04-29 | End: 2020-04-29 | Stop reason: HOSPADM

## 2020-04-29 RX ORDER — MINERAL OIL 100 G/100G
1 OIL RECTAL ONCE
Status: COMPLETED | OUTPATIENT
Start: 2020-04-29 | End: 2020-04-29

## 2020-04-29 RX ORDER — SENNA AND DOCUSATE SODIUM 50; 8.6 MG/1; MG/1
1 TABLET, FILM COATED ORAL 2 TIMES DAILY
Qty: 60 TABLET | Refills: 0 | Status: SHIPPED | OUTPATIENT
Start: 2020-04-29 | End: 2020-07-31

## 2020-04-29 RX ADMIN — Medication 1 MG: at 00:06

## 2020-04-29 RX ADMIN — INSULIN GLARGINE 15 UNITS: 100 INJECTION, SOLUTION SUBCUTANEOUS at 13:08

## 2020-04-29 RX ADMIN — POLYETHYLENE GLYCOL 3350 17 G: 17 POWDER, FOR SOLUTION ORAL at 08:41

## 2020-04-29 RX ADMIN — MINERAL OIL 1 ENEMA: 118 ENEMA RECTAL at 10:33

## 2020-04-29 RX ADMIN — FAMOTIDINE 20 MG: 20 TABLET ORAL at 08:41

## 2020-04-29 RX ADMIN — KETOROLAC TROMETHAMINE 15 MG: 15 INJECTION, SOLUTION INTRAMUSCULAR; INTRAVENOUS at 00:06

## 2020-04-29 RX ADMIN — INSULIN ASPART 2 UNITS: 100 INJECTION, SOLUTION INTRAVENOUS; SUBCUTANEOUS at 03:52

## 2020-04-29 RX ADMIN — INSULIN ASPART 1 UNITS: 100 INJECTION, SOLUTION INTRAVENOUS; SUBCUTANEOUS at 08:41

## 2020-04-29 RX ADMIN — BICTEGRAVIR SODIUM, EMTRICITABINE, AND TENOFOVIR ALAFENAMIDE FUMARATE 1 TABLET: 50; 200; 25 TABLET ORAL at 08:41

## 2020-04-29 RX ADMIN — KETOROLAC TROMETHAMINE 30 MG: 15 INJECTION, SOLUTION INTRAMUSCULAR; INTRAVENOUS at 09:37

## 2020-04-29 ASSESSMENT — ACTIVITIES OF DAILY LIVING (ADL)
ADLS_ACUITY_SCORE: 12

## 2020-04-29 NOTE — PLAN OF CARE
"/77 (BP Location: Left arm)   Pulse 85   Temp 97.6  F (36.4  C) (Oral)   Resp 16   Ht 1.626 m (5' 4\")   Wt 72.6 kg (160 lb)   SpO2 98%   BMI 27.46 kg/m      Reason for admission: recurring SBO  Neuro: A&Ox4, calls appropriately, pt frustrated about situation not getting better and really wanting to eat-had snuck crackers on eves, given melatonin x1 for sleep, pt sober 180 days  Cardiac: WDL, no chest pain  Respiratory: WDL, RA, no SOB  GI/: voiding not saving, hypoactive BS, LBM: 4/28 loose/green  Skin: WDL, pt in clothing-declined gown  Pain: given toradol x1 for LUQ pain, given GI cocktail for acid reflux  Lines: currently no IV access- L PIV occluded, pt refused to be poked, ernie PARKER. Order to run NS @ 100  Drains: X  Incisions: X  Activity: independent  Diet: NPO  Labs: bg-232 & 190-corrected with 2 units  Changes/Plan: pt asking us to contact [facility] he is staying at-could not find any info in notes about where he is currently living, will order IV access @ 0600 and try again, continue POC  "

## 2020-04-29 NOTE — TELEPHONE ENCOUNTER
"Sent to provider for clarification.   Zunilda Lindo RN on 4/29/2020 at 11:15 AM         04/20/2020 Note per Aleah:  \"Aleah Hampton RN           3:17 PM   Note      They will fax his information over Thursday morning  but did correct me. It is Lantus 30 units am and 30 units PM, Humalog is 10-15 units with meals plus sliding scale that you had sent.   He is checking his BS as requested . Aleah ELLSWORTH        \"    "

## 2020-04-29 NOTE — PROGRESS NOTES
Dont want to put IV on his arms refused lidocaine for numbing tried to put IV on his hand but cant stand the pain ordered to stop.

## 2020-04-29 NOTE — PROGRESS NOTES
Social Work: Assessment with Discharge Plan    Patient Name:  Andrew Lockwood  :  1967  Age:  52 year old  MRN:  [id number]  Risk/Complexity Score:  Filed Complexity Screen Score: 7  Completed assessment with:  Chart review, pt, [facility]    Presenting Information   Reason for Referral:  Discharge plan, pt wanting to ensure he can return to his sober living facility  Date of Intake:  2020  Referral Source:  Physician  Decision Maker:  Pt  Alternate Decision Maker:  Per [facility] policy  Health Care Directive: Education previously provided on 20- pt said he can't remember if he has completed a health care directive since then.   Living Situation:  Sober Living: [place] [address]. Pt reported he has lived here for about 3 weeks and reported things are much better here than at prior sober living facilities.   Previous Functional Status:  Independent  Patient and family understanding of hospitalization:  Pt reported he was very constipated and had a lot of poop in his GI tract but is feeling much better now.   Cultural/Language/Spiritual Considerations:  Per previous assessment on  pt identifies as Jew   Adjustment to Illness:  Pt presents as adjusting appropriately.     Physical Health  Reason for Admission:    1. Constipation, unspecified constipation type    2. Partial small bowel obstruction (H)      Services Needed/Recommended:  Other:  Return to Sober Living facility    Mental Health/Chemical Dependency  Diagnosis:  Pt has a history of chemical dependency.   Support/Services in Place:  Pt resides at [facility], reports he still has a CD counselor at [facility] named Mirian, and a sponsor named Leon.   Pt reported he currently has 130 days of sobriety.   Services Needed/Recommended:  Continue with current services.     Support System  Significant relationship at present time:  Per previous assessment on  pt reported having cut ties with previous  friends who were still using.   Pt has supports in place to support his sobriety.   Family of origin is available for support:  Unknown  Other support available: Per previous assessment on 2/25 pt works with a [facility] SW.   Gaps in support system:  None identified.   Patient is caregiver to:  Other:  Unknown     Provider Information   Primary Care Physician:  Caitie Lamb   [phone]   Clinic:  [address] [place] [address] 741 / [facility] [address]      :  Per previous assessment- Simon Cadena [phone]    Financial   Income Source:  Did not discuss  Financial Concerns:  None identified  Insurance:    Payor/Plan Subscriber Name Rel Member # Group #   BCBS - BCBS INDIVIDUAL [facility] [id number]      [address] [address]   [place]ICAID [place] - MEDIC[place]* BREE ROSAS [facility] [id number]       [address] [address]       Discharge Plan   Patient and family discharge goal:  Pt wishes to return to [facility] and asked that SW call staff Dawn there to confirm his return.  DAYNA contacted Dawn (p. [phone], f. [phone])- per Dawn pt is ok to return to facility today. She requested discharge orders and that pt get his meds filled here and sent with pt.   Dawn requested that a medical cab ride be arranged for pt vs pt's roommate picking him up to limit the number of residents out in the community.   Provided education on discharge plan:  YES  Patient agreeable to discharge plan:  YES  A list of Medicare Certified Facilities was provided to the patient and/or family to encourage patient choice. Patient's choices for facility are:  NA  Will NH provide Skilled rehabilitation or complex medical:  NA  General information regarding anticipated insurance coverage and possible out of pocket cost was discussed. Patient and patient's family are aware patient may incur the cost of transportation to the facility, pending insurance payment: NA  Barriers to discharge:  Medical  clearance    Discharge Recommendations   Anticipated Disposition:  Facility:  Return to [facility]  Transportation Needs:  Cab:  Pt to arrange via [employer]- [phone]  Name of Transportation Company and Phone:  [employer]    Additional comments   Pt is a 52 year old male admitted with a small bowel obstruction.     Per medical team pt is ready for discharge today and wants to ensure he can return to his sober living facility.     SW spoke to pt by phone to coordinate his discharge. DAYNA attempted to update pt after talking with Dawn but pt couldn't be reached. Pt's bedside nurse Jane was updated and given the [employer] phone number to give pt for arranging a discharge ride.       RADHA Valera, [address]   [phone] (pager) [phone]  4/29/2020

## 2020-04-29 NOTE — PROVIDER NOTIFICATION
Kayy PARKER concerning lantus, pt refused when it was due. While I was giving novalog he asked if it was the lantus and why he wasn't getting 20 units like he usually does at home. His dose here is 15 units 2x/day.    Awaiting response.    Okay to give Lantus per MD- pt now asleep.

## 2020-04-29 NOTE — TELEPHONE ENCOUNTER
Orders Per Ashley Connelly faxed to [facility] attn Primitivo.   Zunilda Lindo RN on 4/29/2020 at 2:18 PM     Ashley Connelly PA-C Miller, Deanne M, RN    Caller: Unspecified (Yesterday,  3:29 PM)                 See below.       Orders below for Mr. Andrew Lockwood group home staff:     Check blood sugar fasting each am, prelunch and predinner DAILY.   Lantus 30 units subcutaneous each am and each pm.   Novolog 15 units with meals, plus correction scale below:   If blood sugar is 150 add 1 unit Novolog   If blood sugar is 200 add 2 units Novolog   If blood sugar is 250 add 3 units Novolog   If blood sugar is 300 add 4 units Novolog   If blood sugar is 350 add 5 units Novolog   If blood sugar is 400 add 6 units Novolog   Call if pt has blood sugar values 70 or less or > 400.     Ashley Connelly PA-C  4/29/2020   Endocrine/Diabetes   [phone]

## 2020-04-29 NOTE — PROGRESS NOTES
Social Work Services Discharge Note      Patient Name:  Andrew Lockwood     Anticipated Discharge Date:  4/29/20    Discharge Disposition:   Other:  [facility]   [address] [place] [address]  P. [phone]  F. [phone]    Following MD:  THEO     Pre-Admission Screening (PAS) online form has been completed.  The Level of Care (LOC) is:  Determined  Confirmation Code is:  NA  Patient/caregiver informed of referral to [facility] for Pre-Admission Screening for skilled nursing facility (SNF) placement and to expect a phone call post discharge from SNF.     Additional Services/Equipment Arranged:  Pt to arrange medical cab via [employer] - [phone]    Pt needs discharge medications filled at hospital prior to discharge.      Patient / Family response to discharge plan: Pt is agreeable and asked that [facility] be updated     Persons notified of above discharge plan:  Pt, bedside nurse, medical team, [facility]    Staff Discharge Instructions:  Please fax discharge orders and signed hard scripts for any controlled substances.  Please print a packet and send with patient.     CTS Handoff completed:  YES    Medicare Notice of Rights provided to the patient/family:  THEO pt does not have Medicare    RADHA Valera, LICSW  7B   [phone] (pager) [phone]  4/29/2020

## 2020-04-29 NOTE — PROGRESS NOTES
"SPIRITUAL HEALTH SERVICES: Tele-Encounter  Patient Location ([facility], Unit): [facility], [place],   Spoke with (patient, family relationship): Pt Andrew      Referral Source: Self-initiated  visit, Sabianism specific.    If applicable: patient was appropriately screened for telechaplaincy support with bedside nurse prior to visit (e.g. Mental Health and Addiction contexts). See call details below.    DATA:  Pt Andrew identifies as Sabianism. He mentioned to me that, \"I came her for a small obstruction, but I am feeling much better now.     Andrew expressed an eagerness to go back to his [facility] as it is still closed due to the current orders surrounding COVID-19.     Andrew's spirits seemed higher this visit than my prior encounter with him a few months back.     He requested an English translation of the Holy Quran and Islamic prayer from myself. I prayed for his restoration and healing.        PLAN:  I will notify on-site  to deliver the Quran translation to him. I will follow up with Andrew for the duration of his stay. [facility] is available to Andrew per request.      DANIELLE Wong    ______________________________    Type of service:  Telephone Visit     has received verbal consent for a TelephoneVisit from the patient? Yes    Distance Provider Location: designated [place] office or home office (secure setting)    Mode of Communication: telephone (via [employer] phone or [employer] tele-call-number ([phone]))      "

## 2020-04-29 NOTE — PLAN OF CARE
"/72 (BP Location: Left arm)   Pulse 79   Temp 97.4  F (36.3  C) (Oral)   Resp 16   Ht 1.626 m (5' 4\")   Wt 72.6 kg (160 lb)   SpO2 99%   BMI 27.46 kg/m      Reason for admission: SBO  Neuro: A&Ox4, pt cooperative but frequently asking the same questions  Cardiac: WDL, denies chest  pain  Respiratory: on RA, denies SOB  GI/: voiding spontaneously, pt had 2 small loose/watery stools, suppository given x1, no gas yet, hypoactive BS  Skin: intact  Pain: denies  LDA: L PIV running NS @ 100  Activity: up ad lexi  Diet/Appetite: pt NPO except meds, pt was caught sneaking crackers this shift though he was repeatedly told he was NPO, MD notified  Plan: continue POC    "

## 2020-04-29 NOTE — PROVIDER NOTIFICATION
Provider notified that pt has been sneaking crackers this shift even though being told repeatedly that he is NPO. Provider called back to discuss, asked to talk to pt, pt declined to speak with provider.

## 2020-04-29 NOTE — DISCHARGE SUMMARY
"[facility], [place]  Discharge Summary - Medicine & Pediatrics       Date of Admission:  4/27/2020  Date of Discharge:  4/29/2020  Discharging Provider: Evin Martin  Discharge Service: Cristina Boggs    Discharge Diagnoses     Constipation  Abdominal pain secondary to constipation  History of recurrent SBO    Follow-ups Needed After Discharge   Follow-up Appointments     Adult [facility] Follow-up and recommended labs and tests      Follow up with primary care provider, Caitie Lamb, as needed,   for hospital follow- up if you are not improving    Appointments on [place] and/or [facility] (with [facility] or [facility]   provider or service). Call [phone] if you haven't heard regarding   these appointments within 7 days of discharge.             Discharge Disposition   Discharged to home  Condition at discharge: Stable    Hospital Course   Andrew Lockwood was admitted on 4/27/2020 for abdominal pain and severe constipation/obstipation.  The following problems were addressed during his hospitalization:    # Abdominal pain 2/2 constipation/obstipation  # Hx recurrent SBO  Last BM and flatus 4/26. At admission reporting \"11/10\" epigastric pain. Reports >10 SBO since 2016. Per chart review, has had ex lap and appendectomy. During previous admission GI and colorectal discussed pt and had been planning on outpatient w/u for MRE due to concern for possible small bowel stricture, but this has not been done (pt no-showed several visits). His care has been fragmented in part due to homelessness. Pain improved in ED with IV dialudid, now avoiding opioids due to constipation. CT with large stool burden, some nonspecific dilation of bowel loops but no obvious transition point. Initially had NG, but now removed and pt without nausea. Pt received multiple enemas and had several bowel movements with some improvement in abdominal pain. BS normal on day of discharge. Encouraged increased fiber and " scheduled bowel regimen on discharge.  - Start Senna-Docusate 1 tab BID  - Start Psyllium 1 packet daily     #Tachycardia - resolved  Mild tachy to 108 on presentation. Likely pain vs dehydration. Resolved s/p pain meds and fluids.     Consultations This Hospital Stay   MEDICATION HISTORY IP PHARMACY CONSULT  VASCULAR ACCESS CARE ADULT IP CONSULT  VASCULAR ACCESS CARE ADULT IP CONSULT  VASCULAR ACCESS CARE ADULT IP CONSULT  VASCULAR ACCESS CARE ADULT IP CONSULT  VASCULAR ACCESS CARE ADULT IP CONSULT  VASCULAR ACCESS CARE ADULT IP CONSULT    Code Status   Full Code       The patient was [facility]  Pager: [phone]  ______________________________________________________________________    Physical Exam   Vital Signs: Temp: 96.4  F (35.8  C) Temp src: Oral BP: 130/78 Pulse: 83   Resp: 18 SpO2: 99 % O2 Device: None (Room air)    Weight: 160 lbs 0 oz     General Appearance: No acute distress. Awake and alert.  Respiratory: CTAB  Cardiovascular: RRR. No m/r/g  GI: BS+. Nontender to palpation. Nondistended. Obese  Skin: No rash  Other: AOx4. Neuro non-focal. Speech fluent.      Primary Care Physician   Caitie Lamb    Discharge Orders      Reason for your hospital stay    You were hospitalized with abdominal pain and constipation, which has improved with enemas and other bowel regimen medications.     Adult [facility] Follow-up and recommended labs and tests    Follow up with primary care provider, Caitie Lamb, as needed, for hospital follow- up if you are not improving    Appointments on [place] and/or [facility] (with [facility] or [facility] provider or service). Call [phone] if you haven't heard regarding these appointments within 7 days of discharge.     Activity    Your activity upon discharge: activity as tolerated     Discharge Instructions    You need to start taking a daily bowel regimen to  help with your constipation. You should take the Senna S one tablet twice a day and drink one 8oz glass of water with the psyllium fiber mixed in every day.     Diet    Follow this diet upon discharge: Regular diet       Significant Results and Procedures   Most Recent 3 BMP's:  Recent Labs   Lab Test 04/29/20  0607 04/28/20  0626 04/27/20  2035 04/27/20  1531 04/27/20  1529    140  --  140 138   POTASSIUM 3.5 3.4  --  3.6 3.5   CHLORIDE 109 106  --   --  106   CO2 24 28  --   --  29   BUN 11 15  --   --  16   CR 0.70 0.76 0.69  --  0.74   ANIONGAP 6 6  --   --  3   LINDA 8.6 8.5  --   --  9.0   * 148*  --  213* 209*       Discharge Medications   Current Discharge Medication List      START taking these medications    Details   psyllium (METAMUCIL SMOOTH TEXTURE) 28 % packet Use one packet in 8oz water daily.  Qty: 30 each, Refills: 0    Associated Diagnoses: Constipation, unspecified constipation type      SENNA-docusate sodium (SENNA S) 8.6-50 MG tablet Take 1 tablet by mouth 2 times daily Hold for loose stools.  Qty: 60 tablet, Refills: 0    Associated Diagnoses: Constipation, unspecified constipation type         CONTINUE these medications which have NOT CHANGED    Details   bictegravir-emtricitabine-tenofovir (BIKTARVY) -25 MG per tablet Take 1 tablet by mouth daily  Qty: 30 tablet, Refills: 11    Associated Diagnoses: HIV infection, unspecified symptom status (H)      famotidine (PEPCID) 20 MG tablet Take 1 tablet (20 mg) by mouth 2 times daily  Qty: 60 tablet, Refills: 1    Associated Diagnoses: Gastroesophageal reflux disease without esophagitis      HUMALOG KWIKPEN 100 UNIT/ML soln INJECT 1-7 UNITS UNDER THE SKIN THREE TIMES A DAY BEFORE MEALS, Disp-3 mL  Qty: 3 mL, Refills: 11    Associated Diagnoses: Type 2 diabetes mellitus without complication, with long-term current use of insulin (H)      ibuprofen (ADVIL/MOTRIN) 200 MG tablet Take 600 mg by mouth every 4 hours as needed for mild pain       insulin glargine (BASAGLAR KWIKPEN) 100 UNIT/ML pen Inject 30 Units Subcutaneous 2 times daily    Comments: If Basaglar is not covered by insurance, may substitute Lantus at same dose and frequency.        Nutritional Supplements (GLUCOSE MANAGEMENT) TABS Use 1-2 tabs for hypoglycemia.  Qty: 30 tablet, Refills: 3    Associated Diagnoses: Type 2 diabetes mellitus with hyperglycemia, without long-term current use of insulin (H)      blood glucose (NO BRAND SPECIFIED) lancets standard Use to test blood sugar four times daily or as directed.  Qty: 100 each, Refills: 0    Associated Diagnoses: Type 2 diabetes mellitus without complication, without long-term current use of insulin (H)      blood glucose (NO BRAND SPECIFIED) test strip 1 strip by In Vitro route 4 times daily (before meals and nightly) Use to test blood sugars 4 times daily or as directed  Qty: 100 strip, Refills: 11    Associated Diagnoses: Type 2 diabetes mellitus with complication, without long-term current use of insulin (H)      blood glucose monitoring (NO BRAND SPECIFIED) meter device kit Use to test blood sugar 4 times daily or as directed.  Qty: 1 kit, Refills: 0    Comments: Please provide meter that is covered by insurance.  Associated Diagnoses: Type 2 diabetes mellitus with complication, without long-term current use of insulin (H)      Ciclopirox 8 % KIT Externally apply 1 Application topically daily In evening, remove from nails once weekly with alcohol  Qty: 34 mL, Refills: 1    Associated Diagnoses: Onychomycosis      !! insulin pen needle (B-D U/F) 31G X 8 MM miscellaneous Use 1 pen needles daily or as directed.  Qty: 100 each, Refills: 3    Associated Diagnoses: Type 2 diabetes mellitus without complication, without long-term current use of insulin (H)      !! insulin pen needle (BD PEN NEEDLE SCOTT 2ND GEN) 32G X 4 MM miscellaneous Use 3-4 daily.  Qty: 250 each, Refills: 3    Associated Diagnoses: Type 2 diabetes mellitus with  hyperglycemia, without long-term current use of insulin (H)      polyethylene glycol (MIRALAX) powder Take 17 g (1 capful) by mouth daily as needed for constipation  Qty: 289 g, Refills: 0    Associated Diagnoses: Small bowel obstruction (H)      STATIN NOT PRESCRIBED (INTENTIONAL) Please choose reason not prescribed, below    Associated Diagnoses: Type 2 diabetes mellitus with complication, without long-term current use of insulin (H)       !! - Potential duplicate medications found. Please discuss with provider.        Allergies   Allergies   Allergen Reactions     Metformin      Abdominal pain     Milk [Lac Bovis] Hives     Dulaglutide Rash     Internal Medicine Staff Attestation  Date of Service: 4/29/2020  I have seen and examined Andrew Lockwood, reviewed the data and discussed the plan of care with the care team on rounds.  I agree with the above documentation with the additions/changes to the ROS, HPI, Exam or data (including my edits in italics):    I discussed pt's care with bedside RN, case management/social work today.  I personally reviewed, labs, medications and past 24 hr notes.    Assessment/Plan/Diagnoses: plan/dx as below, which contains my edits and reflects our joint medical decision-making.   >50% of 35 minutes spent in direct patient care and coordinating discharge    Evin Martin MD PhD  Internal Medicine Hospitalist & Staff Physician   of Internal Medicine   [facility]  Pager: [phone]

## 2020-04-30 ENCOUNTER — PATIENT OUTREACH (OUTPATIENT)
Dept: CARE COORDINATION | Facility: CLINIC | Age: 55
End: 2020-04-30

## 2020-04-30 NOTE — PROGRESS NOTES
Discharge Disposition:   Other:  [facility]   [address] [place] [id number]  P. [phone]  F. [phone]    Patient discharged to care facility   No further calls were made at this time

## 2020-04-30 NOTE — PROGRESS NOTES
Focus:  Discharge  D:   Orders to discharge back to the facility he came from  I:   Vitals taken        Got Enema and had 1 small BM afterward but had 3 loose BMs before the enema         PIV Dc'd with all tubing retained        All  Discharge orders explained with pt voiced understanding         All belongings taken         Got Lantus after ate/pt wanted         Got Toradol times one for HA  A:     Denied any c/o upon leaving [facility] nor any signs observed  P:   Reported to oncoming nurse

## 2020-05-14 DIAGNOSIS — E11.9 TYPE 2 DIABETES MELLITUS WITHOUT COMPLICATION, WITH LONG-TERM CURRENT USE OF INSULIN (H): ICD-10-CM

## 2020-05-14 DIAGNOSIS — Z79.4 TYPE 2 DIABETES MELLITUS WITHOUT COMPLICATION, WITH LONG-TERM CURRENT USE OF INSULIN (H): ICD-10-CM

## 2020-05-15 RX ORDER — INSULIN LISPRO 100 [IU]/ML
INJECTION, SOLUTION INTRAVENOUS; SUBCUTANEOUS
Qty: 3 ML | Refills: 11 | Status: SHIPPED | OUTPATIENT
Start: 2020-05-15 | End: 2020-06-10

## 2020-05-15 NOTE — TELEPHONE ENCOUNTER
Insulin Lispro (1 Unit Dial) 100 UNIT/ML Solution pen-injector   Last Written Prescription Date:  3/25/2020  Last Fill Quantity: 3,   # refills: 11  Last Office Visit : 2/11/2020  Future Office visit:  None  Routing refill request to provider for review/approval because:  Please resend order to alternative pharmacy for Pt care  Drug not on the [facility], [facility] or  Health refill protocol or controlled substance    Sarah Rodgers RN  Central Triage Red Flags/Med Refills

## 2020-05-22 DIAGNOSIS — K21.9 GASTROESOPHAGEAL REFLUX DISEASE WITHOUT ESOPHAGITIS: ICD-10-CM

## 2020-05-22 RX ORDER — FAMOTIDINE 20 MG/1
20 TABLET, FILM COATED ORAL 2 TIMES DAILY
Qty: 60 TABLET | Refills: 11 | Status: SHIPPED | OUTPATIENT
Start: 2020-05-22 | End: 2020-08-23

## 2020-05-23 ENCOUNTER — HOSPITAL ENCOUNTER (EMERGENCY)
Facility: CLINIC | Age: 55
Discharge: HOME | End: 2020-05-23
Attending: EMERGENCY MEDICINE | Admitting: EMERGENCY MEDICINE
Payer: COMMERCIAL

## 2020-05-23 ENCOUNTER — APPOINTMENT (OUTPATIENT)
Dept: GENERAL RADIOLOGY | Facility: CLINIC | Age: 55
End: 2020-05-23
Attending: EMERGENCY MEDICINE
Payer: COMMERCIAL

## 2020-05-23 VITALS
TEMPERATURE: 99.2 F | RESPIRATION RATE: 16 BRPM | HEIGHT: 64 IN | WEIGHT: 160 LBS | SYSTOLIC BLOOD PRESSURE: 136 MMHG | DIASTOLIC BLOOD PRESSURE: 92 MMHG | HEART RATE: 93 BPM | OXYGEN SATURATION: 99 % | BODY MASS INDEX: 27.31 KG/M2

## 2020-05-23 DIAGNOSIS — R73.9 HYPERGLYCEMIA: ICD-10-CM

## 2020-05-23 DIAGNOSIS — M79.671 RIGHT FOOT PAIN: ICD-10-CM

## 2020-05-23 LAB
ALBUMIN UR-MCNC: NEGATIVE MG/DL
ANION GAP SERPL CALCULATED.3IONS-SCNC: 4 MMOL/L (ref 3–14)
APPEARANCE UR: CLEAR
BASOPHILS # BLD AUTO: 0 10E9/L (ref 0–0.2)
BASOPHILS NFR BLD AUTO: 0.6 %
BILIRUB UR QL STRIP: NEGATIVE
BUN SERPL-MCNC: 13 MG/DL (ref 7–30)
CALCIUM SERPL-MCNC: 8.9 MG/DL (ref 8.5–10.1)
CHLORIDE SERPL-SCNC: 105 MMOL/L (ref 94–109)
CO2 SERPL-SCNC: 26 MMOL/L (ref 20–32)
COLOR UR AUTO: ABNORMAL
CREAT SERPL-MCNC: 0.71 MG/DL (ref 0.66–1.25)
DIFFERENTIAL METHOD BLD: NORMAL
EOSINOPHIL # BLD AUTO: 0.2 10E9/L (ref 0–0.7)
EOSINOPHIL NFR BLD AUTO: 3.6 %
ERYTHROCYTE [DISTWIDTH] IN BLOOD BY AUTOMATED COUNT: 11.9 % (ref 10–15)
GFR SERPL CREATININE-BSD FRML MDRD: >90 ML/MIN/{1.73_M2}
GLUCOSE BLDC GLUCOMTR-MCNC: 290 MG/DL (ref 70–99)
GLUCOSE BLDC GLUCOMTR-MCNC: 320 MG/DL (ref 70–99)
GLUCOSE SERPL-MCNC: 317 MG/DL (ref 70–99)
GLUCOSE UR STRIP-MCNC: >1000 MG/DL
HCT VFR BLD AUTO: 40.6 % (ref 40–53)
HGB BLD-MCNC: 13.9 G/DL (ref 13.3–17.7)
HGB UR QL STRIP: NEGATIVE
IMM GRANULOCYTES # BLD: 0.1 10E9/L (ref 0–0.4)
IMM GRANULOCYTES NFR BLD: 1.3 %
KETONES BLD-SCNC: 0 MMOL/L (ref 0–0.6)
KETONES UR STRIP-MCNC: NEGATIVE MG/DL
LEUKOCYTE ESTERASE UR QL STRIP: NEGATIVE
LYMPHOCYTES # BLD AUTO: 2 10E9/L (ref 0.8–5.3)
LYMPHOCYTES NFR BLD AUTO: 31.1 %
MCH RBC QN AUTO: 31.5 PG (ref 26.5–33)
MCHC RBC AUTO-ENTMCNC: 34.2 G/DL (ref 31.5–36.5)
MCV RBC AUTO: 92 FL (ref 78–100)
MONOCYTES # BLD AUTO: 0.6 10E9/L (ref 0–1.3)
MONOCYTES NFR BLD AUTO: 8.7 %
NEUTROPHILS # BLD AUTO: 3.5 10E9/L (ref 1.6–8.3)
NEUTROPHILS NFR BLD AUTO: 54.7 %
NITRATE UR QL: NEGATIVE
NRBC # BLD AUTO: 0 10*3/UL
NRBC BLD AUTO-RTO: 0 /100
PH UR STRIP: 6 PH (ref 5–7)
PLATELET # BLD AUTO: 314 10E9/L (ref 150–450)
POTASSIUM SERPL-SCNC: 3.5 MMOL/L (ref 3.4–5.3)
RBC # BLD AUTO: 4.41 10E12/L (ref 4.4–5.9)
RBC #/AREA URNS AUTO: <1 /HPF (ref 0–2)
SODIUM SERPL-SCNC: 136 MMOL/L (ref 133–144)
SOURCE: ABNORMAL
SP GR UR STRIP: 1.03 (ref 1–1.03)
TRANS CELLS #/AREA URNS HPF: <1 /HPF (ref 0–1)
UROBILINOGEN UR STRIP-MCNC: NORMAL MG/DL (ref 0–2)
WBC # BLD AUTO: 6.3 10E9/L (ref 4–11)
WBC #/AREA URNS AUTO: 0 /HPF (ref 0–5)

## 2020-05-23 PROCEDURE — 81001 URINALYSIS AUTO W/SCOPE: CPT | Performed by: EMERGENCY MEDICINE

## 2020-05-23 PROCEDURE — 80048 BASIC METABOLIC PNL TOTAL CA: CPT | Performed by: EMERGENCY MEDICINE

## 2020-05-23 PROCEDURE — 99285 EMERGENCY DEPT VISIT HI MDM: CPT | Mod: Z6 | Performed by: EMERGENCY MEDICINE

## 2020-05-23 PROCEDURE — 99284 EMERGENCY DEPT VISIT MOD MDM: CPT | Mod: 25 | Performed by: EMERGENCY MEDICINE

## 2020-05-23 PROCEDURE — 25800030 ZZH RX IP 258 OP 636: Performed by: EMERGENCY MEDICINE

## 2020-05-23 PROCEDURE — 96360 HYDRATION IV INFUSION INIT: CPT | Performed by: EMERGENCY MEDICINE

## 2020-05-23 PROCEDURE — 25000131 ZZH RX MED GY IP 250 OP 636 PS 637: Performed by: EMERGENCY MEDICINE

## 2020-05-23 PROCEDURE — 96372 THER/PROPH/DIAG INJ SC/IM: CPT | Mod: 59 | Performed by: EMERGENCY MEDICINE

## 2020-05-23 PROCEDURE — 00000146 ZZHCL STATISTIC GLUCOSE BY METER IP

## 2020-05-23 PROCEDURE — 82010 KETONE BODYS QUAN: CPT | Performed by: EMERGENCY MEDICINE

## 2020-05-23 PROCEDURE — 85025 COMPLETE CBC W/AUTO DIFF WBC: CPT | Performed by: EMERGENCY MEDICINE

## 2020-05-23 PROCEDURE — 73630 X-RAY EXAM OF FOOT: CPT | Mod: RT

## 2020-05-23 RX ADMIN — INSULIN ASPART 6 UNITS: 100 INJECTION, SOLUTION INTRAVENOUS; SUBCUTANEOUS at 21:09

## 2020-05-23 RX ADMIN — SODIUM CHLORIDE 1000 ML: 9 INJECTION, SOLUTION INTRAVENOUS at 20:33

## 2020-05-23 ASSESSMENT — ENCOUNTER SYMPTOMS
ARTHRALGIAS: 1
HEADACHES: 0
COLOR CHANGE: 0
NAUSEA: 0
CONFUSION: 0
DIFFICULTY URINATING: 0
SHORTNESS OF BREATH: 0
DIARRHEA: 0
ABDOMINAL PAIN: 0
COUGH: 0
DYSURIA: 0
FEVER: 0
APPETITE CHANGE: 0
VOMITING: 0
SORE THROAT: 0

## 2020-05-23 ASSESSMENT — MIFFLIN-ST. JEOR: SCORE: 1476.76

## 2020-05-23 NOTE — ED AVS SNAPSHOT
[facility], [place], Emergency Department  [address] [place] [id number]  Phone:  [phone]                                    Andrew Lockwood   MRN: [id number]    Department:  [facility], Emergency Department   Date of Visit:  5/23/2020           After Visit Summary Signature Page    I have received my discharge instructions, and my questions have been answered. I have discussed any challenges I see with this plan with the nurse or doctor.    ..........................................................................................................................................  Patient/Patient Representative Signature      ..........................................................................................................................................  Patient Representative Print Name and Relationship to Patient    ..................................................               ................................................  Date                                   Time    ..........................................................................................................................................  Reviewed by Signature/Title    ...................................................              ..............................................  Date                                               Time          22EPIC Rev 08/18

## 2020-05-24 NOTE — ED TRIAGE NOTES
"Triage Assessment & Note:    /85   Temp 99.2  F (37.3  C) (Oral)   Resp 16   Ht 1.626 m (5' 4\")   Wt 72.6 kg (160 lb)   SpO2 98%   BMI 27.46 kg/m        Patient presents with: Pt comes to triage with reports of right foot pain and hyperglycemia. No reports of fever, cough, SOB, CP, or travel    Home Treatments/Remedies: home medications    Febrile / Afebrile: afebrile    Duration of C/o: n/a    Anita Tarango RN  May 23, 2020        "

## 2020-05-24 NOTE — DISCHARGE INSTRUCTIONS
You have been seen in the emergency department today for elevated blood sugars.  After fluids and insulin, your blood sugar is slowly coming down.  You have indicated that you wish to be discharged now.  We recommend that you continue with your insulin as your endocrine clinic prescribed.  Make sure you do carbohydrate correction as they directed.  Call your endocrine clinic on Tuesday to let them know that you are in the emergency department for high blood sugars.  The rest of your blood work looks normal.  Your x-ray was read by radiology as normal without any sign of bone problems.  Use ice and tylenol as needed for foot pain.

## 2020-05-24 NOTE — ED PROVIDER NOTES
ED Provider Note  [facility]      History     Chief Complaint   Patient presents with     Hyperglycemia     Foot Pain     right     HPI  Andrew Lockwood is a 52 year old male with a history of type 2 diabetes, currently on insulin, HIV/AIDS currently on anti-retroviral therapy, chronic abdominal pain, sleep apnea, who presents to the emergency department today with elevated blood sugar and right great toe pain.  Patient has a history of type 2 diabetes and has been on insulin.  He states that his insurance will not pay for the particular insulin that his endocrinologist recommends, so instead he is on basaglar 30 units in the morning and Humalog with meals.  He states that it is now 7:30 PM, and he last ate around lunch.  He did take his long-acting insulin this morning and he did take his Humalog with lunch.  He claims he has not eaten since lunch and did not have dinner so has not taken any more insulin since then.  He lives in a sober house (previous methamphetamine abuser, he reports he has been sober for over 120 days and has been living in the sober house for over a month - he was congratulated on his sobriety) and they checked his blood sugar today, and this afternoon found it to be 448.  He states that he has had blood glucose readings in the 200-300 range, but once it gets this high it is protocol for the sober living facility to send him here.  He admits that he had 3 bagels for lunch today.  He did not take any corrective insulin for the amount of carbs that he consumed.      His last endocrine clinic visit was 2/11/20.     He denies any fevers, chills, nasal congestion, sore throat, cough, chest pain, shortness of breath, abdominal pain, nausea, vomiting, or diarrhea.  No known COVID exposure.    He is complaining of some right great toe pain which has been going on for a couple of days.  No known injury.  Denies any history of diabetic foot ulcers or drainage.  Denies any  history of diabetic neuropathy.        Past Medical History  Past Medical History:   Diagnosis Date     AIDS (H)      Allergic rhinitis due to other allergen     DNS     Anal dysplasia      Chronic abdominal pain      CNS toxoplasmosis (H)      Diabetes type 2, controlled (H)      GERD (gastroesophageal reflux disease)      HIV (human immunodeficiency virus infection) (H)      Periungual wart      Sleep apnea     doesn't use CPAP     Past Surgical History:   Procedure Laterality Date     C NONSPECIFIC PROCEDURE      right forearm fracture     COLONOSCOPY Left 1/22/2016    Procedure: COMBINED COLONOSCOPY, SINGLE OR MULTIPLE BIOPSY/POLYPECTOMY BY BIOPSY;  Surgeon: Clark Saini MD;  Location: [facility]     HC EXPLORE UNDESC TESTIS,INGUIN/SCROTAL       LAPAROSCOPIC APPENDECTOMY N/A 1/31/2018    Procedure: LAPAROSCOPIC APPENDECTOMY;  LAPAROSCOPIC APPENDECTOMY;  Surgeon: Dawn Holt MD;  Location: [facility]     LAPAROSCOPY DIAGNOSTIC (GENERAL) N/A 7/26/2016    Procedure: LAPAROSCOPY DIAGNOSTIC (GENERAL);  Surgeon: Susannah Arriaga MD;  Location: [facility]     LAPAROSCOPY DIAGNOSTIC (GENERAL) N/A 4/16/2018    Procedure: LAPAROSCOPY DIAGNOSTIC (GENERAL);  Diagnostic laparoscopy and lysis of adhesions;  Surgeon: Prince Dowling MD;  Location: [facility] OR     OPTICAL TRACKING SYSTEM CRANIOTOMY, EXCISE TUMOR, COMBINED Left 4/10/2015    Procedure: COMBINED OPTICAL TRACKING SYSTEM CRANIOTOMY, EXCISE TUMOR;  Surgeon: Mirlande Colmenares MD;  Location: [facility]     REPAIR GAMEKEEPER'S THUMB Right 12/2/2016    Procedure: REPAIR LIGAMENT ULNAR COLLATERAL THUMB (GAMEKEEPER'S);  Surgeon: Evin Zamorano MD;  [facility]     bictegravir-emtricitabine-tenofovir (BIKTARVY) -25 MG per tablet  famotidine (PEPCID) 20 MG tablet  insulin glargine (BASAGLAR KWIKPEN) 100 UNIT/ML pen  insulin lispro (HUMALOG KWIKPEN) 100 UNIT/ML (1 unit dial) KWIKPEN  polyethylene glycol (MIRALAX) powder  psyllium (METAMUCIL SMOOTH  TEXTURE) 28 % packet  SENNA-docusate sodium (SENNA S) 8.6-50 MG tablet  blood glucose (NO BRAND SPECIFIED) lancets standard  blood glucose (NO BRAND SPECIFIED) test strip  blood glucose monitoring (NO BRAND SPECIFIED) meter device kit  Ciclopirox 8 % KIT  ibuprofen (ADVIL/MOTRIN) 200 MG tablet  insulin pen needle (B-D U/F) 31G X 8 MM miscellaneous  insulin pen needle (BD PEN NEEDLE SCOTT 2ND GEN) 32G X 4 MM miscellaneous  Nutritional Supplements (GLUCOSE MANAGEMENT) TABS  STATIN NOT PRESCRIBED (INTENTIONAL)      Allergies   Allergen Reactions     Metformin      Abdominal pain     Milk [Lac Bovis] Hives     Dulaglutide Rash     Past medical history, past surgical history, medications, and allergies were reviewed with the patient. Additional pertinent items: None    Family History  Family History   Problem Relation Age of Onset     Diabetes Brother      Diabetes Father      Alzheimer Disease Father      Unknown/Adopted Mother      Diabetes Paternal Grandfather      Cancer No family hx of         no skin cancer     Skin Cancer No family hx of         no famiy hx of skin cancer     Glaucoma No family hx of      Macular Degeneration No family hx of      Family history was reviewed with the patient. Additional pertinent items: None    Social History  Social History     Tobacco Use     Smoking status: Current Some Day Smoker     Packs/day: 0.25     Years: 40.00     Pack years: 10.00     Types: Cigarettes     Smokeless tobacco: Former User   Substance Use Topics     Alcohol use: No     Alcohol/week: 0.0 standard drinks     Comment: Last etoh in 2007     Drug use: Not Currently     Types: Marijuana, Methamphetamines     Comment: Sober 120 days on 2/28/2020      Social history was reviewed with the patient. Additional pertinent items: None    Review of Systems   Constitutional: Negative for appetite change and fever.   HENT: Negative for congestion and sore throat.    Respiratory: Negative for cough and shortness of breath.   "  Cardiovascular: Negative for chest pain.   Gastrointestinal: Negative for abdominal pain, diarrhea, nausea and vomiting.   Endocrine:        Elevated blood sugars without symptoms   Genitourinary: Negative for difficulty urinating and dysuria.   Musculoskeletal: Positive for arthralgias.        R great toe pain   Skin: Negative for color change.   Neurological: Negative for headaches.   Psychiatric/Behavioral: Negative for confusion.   All other systems reviewed and are negative.    A complete review of systems was performed with pertinent positives and negatives noted in the HPI, and all other systems negative.    Physical Exam   BP: 126/85  Pulse: 93  Heart Rate: 104  Temp: 99.2  F (37.3  C)  Resp: 16  Height: 162.6 cm (5' 4\")  Weight: 72.6 kg (160 lb)  SpO2: 98 %  Physical Exam  Vitals signs and nursing note reviewed.   Constitutional:       Appearance: He is well-developed.      Comments: Adult male, watching TV, alert, cooperative, NAD   HENT:      Head: Normocephalic.   Eyes:      Pupils: Pupils are equal, round, and reactive to light.   Cardiovascular:      Rate and Rhythm: Normal rate and regular rhythm.      Heart sounds: Normal heart sounds. No murmur. No gallop.    Pulmonary:      Effort: Pulmonary effort is normal. No respiratory distress.      Breath sounds: Normal breath sounds. No wheezing or rales.   Abdominal:      General: Bowel sounds are normal. There is no distension.      Palpations: Abdomen is soft.      Tenderness: There is no abdominal tenderness. There is no guarding or rebound.      Comments: Obese, soft, nontender   Musculoskeletal:      Comments: R foot: no sign of erythema, no sign of ulcers or open wounds.  Distal CMS is intact.  There appears to be a bunion type deformity at first MTP joint. No erythema or inflammation seen.  TTP at MTP and distal to great toe.    Skin:     General: Skin is warm and dry.   Neurological:      Mental Status: He is alert and oriented to person, place, " and time.   Psychiatric:         Behavior: Behavior normal.         ED Course      Procedures                         Results for orders placed or performed during the hospital encounter of 05/23/20   XR Foot Port Right 3 Views     Status: None    Narrative    EXAM: XR FOOT PORT RT 3 VW  LOCATION: [facility]  DATE/TIME: 5/23/2020 7:53 PM    INDICATION: First MTP joint pain, great toe pain  COMPARISON: None.      Impression    IMPRESSION: The right foot is negative for fracture. No erosive changes are identified. Specifically, the great toe is negative for fracture or erosive change.   Ketone Beta-Hydroxybutyrate Quantitative     Status: None   Result Value Ref Range    Ketone Quantitative 0.0 0.0 - 0.6 mmol/L   UA with Microscopic reflex to Culture     Status: Abnormal    Specimen: Urine clean catch; Midstream Urine   Result Value Ref Range    Color Urine Light Yellow     Appearance Urine Clear     Glucose Urine >1000 (A) NEG^Negative mg/dL    Bilirubin Urine Negative NEG^Negative    Ketones Urine Negative NEG^Negative mg/dL    Specific Gravity Urine 1.026 1.003 - 1.035    Blood Urine Negative NEG^Negative    pH Urine 6.0 5.0 - 7.0 pH    Protein Albumin Urine Negative NEG^Negative mg/dL    Urobilinogen mg/dL Normal 0.0 - 2.0 mg/dL    Nitrite Urine Negative NEG^Negative    Leukocyte Esterase Urine Negative NEG^Negative    Source Midstream Urine     WBC Urine 0 0 - 5 /HPF    RBC Urine <1 0 - 2 /HPF    Transitional Epi <1 0 - 1 /HPF   Basic metabolic panel     Status: Abnormal   Result Value Ref Range    Sodium 136 133 - 144 mmol/L    Potassium 3.5 3.4 - 5.3 mmol/L    Chloride 105 94 - 109 mmol/L    Carbon Dioxide 26 20 - 32 mmol/L    Anion Gap 4 3 - 14 mmol/L    Glucose 317 (H) 70 - 99 mg/dL    Urea Nitrogen 13 7 - 30 mg/dL    Creatinine 0.71 0.66 - 1.25 mg/dL    GFR Estimate >90 >60 mL/min/[1.73_m2]    GFR Estimate If Black >90 >60 mL/min/[1.73_m2]    Calcium 8.9 8.5 - 10.1 mg/dL   CBC with platelets  differential     Status: None   Result Value Ref Range    WBC 6.3 4.0 - 11.0 10e9/L    RBC Count 4.41 4.4 - 5.9 10e12/L    Hemoglobin 13.9 13.3 - 17.7 g/dL    Hematocrit 40.6 40.0 - 53.0 %    MCV 92 78 - 100 fl    MCH 31.5 26.5 - 33.0 pg    MCHC 34.2 31.5 - 36.5 g/dL    RDW 11.9 10.0 - 15.0 %    Platelet Count 314 150 - 450 10e9/L    Diff Method Automated Method     % Neutrophils 54.7 %    % Lymphocytes 31.1 %    % Monocytes 8.7 %    % Eosinophils 3.6 %    % Basophils 0.6 %    % Immature Granulocytes 1.3 %    Nucleated RBCs 0 0 /100    Absolute Neutrophil 3.5 1.6 - 8.3 10e9/L    Absolute Lymphocytes 2.0 0.8 - 5.3 10e9/L    Absolute Monocytes 0.6 0.0 - 1.3 10e9/L    Absolute Eosinophils 0.2 0.0 - 0.7 10e9/L    Absolute Basophils 0.0 0.0 - 0.2 10e9/L    Abs Immature Granulocytes 0.1 0 - 0.4 10e9/L    Absolute Nucleated RBC 0.0    Glucose by meter     Status: Abnormal   Result Value Ref Range    Glucose 320 (H) 70 - 99 mg/dL   Glucose by meter     Status: Abnormal   Result Value Ref Range    Glucose 290 (H) 70 - 99 mg/dL       Medications   0.9% sodium chloride BOLUS (0 mLs Intravenous Stopped 5/23/20 2148)   insulin aspart (NovoLOG) injection (RAPID ACTING) (6 Units Subcutaneous Given 5/23/20 2109)        Assessments & Plan (with Medical Decision Making)   Patient presents for the above complaints.  With regard to his hyperglycemia, this is a frequent occurrence for the patient frequently due to dietary noncompliance and inadequate correction of carbohydrate intake.  He is well-appearing, does not have a fever, and is denying any infectious symptoms so I think infection is far less likely.  Blood glucose here in the emergency department was 320.  CBC within normal limits, BMP within normal limits with the exception of a glucose of 317, ketones 0.  Patient was given IV fluids here in the emergency department as well as a dose of insulin.  Upon recheck his glucose is 290.  He is asking for food.  He is not interested in  waiting for further evaluation or treatment or additional insulin.  I did recommend that he continue to follow his endocrinology clinic's recommendations with regard to blood sugar control and he should try to avoid eating 3 bagels at one time as this carb load is likely what caused his hyperglycemia to start with.    With regard to his right foot, he does not have any sign of erythema or ulcers or diabetic foot sores.  He is tender to palpation at the first MTP joint and distal to this.  However there is no erythema.  Differential diagnosis could include gout however it does not appear to be red and inflamed at this point.  It appears to be more of a bunion deformity.  Patient adamantly denies that he has bunions. Patient had a foot XR which demonstrates no acute bony abnormalities.  There again does not appear to be any sign of open wound or ulcer causing issues.  I have advised ice and Tylenol.  Follow-up with clinic.    I have reviewed the nursing notes. I have reviewed the findings, diagnosis, plan and need for follow up with the patient.    Discharge Medication List as of 5/23/2020 10:35 PM          Final diagnoses:   Hyperglycemia   Right foot pain       --  Leticia Cruz MD   Emergency Medicine   [facility], [place], EMERGENCY DEPARTMENT  5/23/2020     Leticia Cruz MD  05/23/20 [id number]

## 2020-05-26 ENCOUNTER — TELEPHONE (OUTPATIENT)
Dept: INFECTIOUS DISEASES | Facility: CLINIC | Age: 55
End: 2020-05-26

## 2020-05-26 ENCOUNTER — VIRTUAL VISIT (OUTPATIENT)
Dept: BEHAVIORAL HEALTH | Facility: CLINIC | Age: 55
End: 2020-05-26
Payer: COMMERCIAL

## 2020-05-26 DIAGNOSIS — F43.10 PTSD (POST-TRAUMATIC STRESS DISORDER): Primary | ICD-10-CM

## 2020-05-26 NOTE — TELEPHONE ENCOUNTER
[facility] Call Center    Phone Message    May a detailed message be left on voicemail: yes     Reason for Call: Medication Question or concern regarding medication   Prescription Clarification  Name of Medication: SENNA-docusate sodium (SENNA S) 8.6-50 MG tablet; AND insulin glargine (BASAGLAR KWIKPEN) 100 UNIT/ML pen    Prescribing Provider: Dr Prieto   Pharmacy: New Pharmacy since Pt is living in a facility now he uses that Pharmacy, [facility], [address], [place] [address], Fax # [phone] - Cielo from [facility], Ph # [phone] Ext [phone] - Okay to leave vml msg - or you can call Pharmacist directly at [phone] and select option to speak to a Pharmacist    New Pharmacy to be [facility]. - [place], [place] - [address]      What on the order needs clarification?     SENNA-docusate sodium (SENNA S) 8.6-50 MG tablet = needs separate Rx for Senna and separate Rx for Docusate - due to this medication being back ordered and also not covered under insurance - so needs New Rx one for Senna and needs New Rx one for Docusate (two separate Rx's and not combined medicine)    insulin glargine (BASAGLAR KWIKPEN) 100 UNIT/ML pen = needs refill = Pt is out - Needs New Rx as New Pharmacy    Please send three New Rx's to New Pharmacy via Fax or E-scribe or call in to above # and talk to Pharmacist - and call Cielo if any questions - Thanks!      Action Taken: Message routed to:  [facility] ID Clinic    Travel Screening: Not Applicable                                                                                                                                          . 97

## 2020-05-26 NOTE — PATIENT INSTRUCTIONS
Scheduling: If you have any concerns about today's visit or wish to schedule another appointment please call our office during normal business hours [phone] (8-5:00 M-F)    Contact Us: Please call [phone] during business hours (8-5:00 M-F).  If after clinic hours, or on the weekend, please call  [phone].    Financial Assistance [phone]  [place] [phone]  [place] Billing [phone]  Medical Records [phone]    MENTAL HEALTH CRISIS NUMBERS:  [facility]:  [facility] - [phone]  Crisis Residence [facility] - [phone]  Walk-In Counseling [facility] [phone]  COPE 24/7 [facility] Mobile Team - [[phone]]    [facility]:  [facility] - [phone]  Walk-in counseling [facility] - [phone]  Walk-in counseling [facility] - [phone]  Crisis Residence [facility] - [phone]  Urgent Care Adult Mental Health:   --Drop-in, 24/7 crisis line, and [facility] Mobile Team [[phone]]    24/7 CRISIS TEXT LINE: www.crisistextline.org OR text 822-112 anywhere, anytime, any crisis    Poison Control Center - [phone]  [facility] - [phone]; or [place] - [phone]    If you have a medical emergency please call 911 or go to the nearest ER.

## 2020-05-26 NOTE — NURSING NOTE
"Chief Complaint   Patient presents with     Consult     PTSD     Would like out of today's visit:  Peace of mind ADD is bad.    VIDEO VISIT  Andrew Lockwood is a 52 year old patient who is being evaluated via a billable video visit.      The patient has been notified of following:   \"This video visit will be conducted via a call between you and your physician/provider. We have found that certain health care needs can be provided without the need for an in-person physical exam. This service lets us provide the care you need with a video conversation. If a prescription is necessary we can send it directly to your pharmacy. If lab work is needed we can place an order for that and you can then stop by our lab to have the test done at a later time. Insurers are generally covering virtual visits as they would in-office visits so billing should not be different than normal.  If for some reason you do get billed incorrectly, you should contact the billing office to correct it and that number is in the AVS .    Patient has given verbal consent for video visit?:  yes    How would you like to obtain your AVS?:  mail    AVS SmartPhrase [PsychAVS] has been placed in 'Patient Instructions':  Yes    "
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
## 2020-06-09 DIAGNOSIS — E11.9 TYPE 2 DIABETES MELLITUS WITHOUT COMPLICATION, WITH LONG-TERM CURRENT USE OF INSULIN (H): ICD-10-CM

## 2020-06-09 DIAGNOSIS — Z79.4 TYPE 2 DIABETES MELLITUS WITHOUT COMPLICATION, WITH LONG-TERM CURRENT USE OF INSULIN (H): ICD-10-CM

## 2020-06-10 RX ORDER — INSULIN GLARGINE 100 [IU]/ML
INJECTION, SOLUTION SUBCUTANEOUS
Qty: 15 ML | Refills: 15 | Status: SHIPPED | OUTPATIENT
Start: 2020-06-10 | End: 2020-09-16

## 2020-06-10 RX ORDER — INSULIN LISPRO 100 [IU]/ML
INJECTION, SOLUTION INTRAVENOUS; SUBCUTANEOUS
Qty: 15 ML | Refills: 17 | Status: SHIPPED | OUTPATIENT
Start: 2020-06-10 | End: 2020-08-24 | Stop reason: ALTCHOICE

## 2020-06-10 NOTE — TELEPHONE ENCOUNTER
Basaglar KwikPen 100 UNIT/ML Solution pen-injector \  Insulin Lispro (1 Unit Dial) 100 UNIT/ML Solution pen-injector   Insulin - refilled per clinic  Last Clinic Visit: 2/11/20, no upcoming visits  Humalog last prescribed 5/15/20, pharmacy requesting different quantity

## 2020-06-18 NOTE — TELEPHONE ENCOUNTER
[facility] Call Center    Phone Message    May a detailed message be left on voicemail: yes     Reason for Call: Other: Cielo called stating she has been trying for a month to get a New Rx fot pt. One for Senna and one for Docusate (two separate Rx's and not combined medicine). Additionally pt needs an RX for insulin glargine (BASAGLAR KWIKPEN) 100 UNIT/ML pen. Pharmacy: [facility] - [place], [place] - [address] [place], [place] [address]. S.  Please review and follow up as soon as possible as his living facility is requesting refills. Roseann Ph: [phone] Ext [phone] FX: [phone]     Action Taken: Message routed to:  [facility] ([facility]): ID    Travel Screening: Not Applicable

## 2020-06-19 ENCOUNTER — TELEPHONE (OUTPATIENT)
Dept: INFECTIOUS DISEASES | Facility: CLINIC | Age: 55
End: 2020-06-19

## 2020-06-19 DIAGNOSIS — K59.00 CONSTIPATION: ICD-10-CM

## 2020-06-19 DIAGNOSIS — K59.00 CONSTIPATION: Primary | ICD-10-CM

## 2020-06-19 RX ORDER — SENNOSIDES 8.6 MG
1 TABLET ORAL 2 TIMES DAILY PRN
Qty: 60 TABLET | Refills: 5 | Status: ON HOLD | OUTPATIENT
Start: 2020-06-19 | End: 2020-11-11

## 2020-06-19 NOTE — TELEPHONE ENCOUNTER
[facility] Call Center    Phone Message    May a detailed message be left on voicemail: no     Reason for Call: Medication Question or concern regarding medication   Prescription Clarification  Name of Medication: docusate sodium (COLACE) 50 MG capsule       Prescribing Provider: Conner      Pharm[facility]: [facility] [place], [place] [facility] [address] [facility]       What on the order needs clarification? 50 mg not available please change rx to 100 mg for docusate sodium. Thank you           Action Taken: Message routed to:  [facility] ([facility]): id    Travel Screening: Not Applicable

## 2020-06-25 ENCOUNTER — HOSPITAL ENCOUNTER (EMERGENCY)
Facility: CLINIC | Age: 55
Discharge: HOME | End: 2020-06-25
Attending: EMERGENCY MEDICINE | Admitting: EMERGENCY MEDICINE
Payer: COMMERCIAL

## 2020-06-25 ENCOUNTER — APPOINTMENT (OUTPATIENT)
Dept: CT IMAGING | Facility: CLINIC | Age: 55
End: 2020-06-25
Attending: EMERGENCY MEDICINE
Payer: COMMERCIAL

## 2020-06-25 VITALS
WEIGHT: 155 LBS | HEART RATE: 97 BPM | OXYGEN SATURATION: 97 % | DIASTOLIC BLOOD PRESSURE: 105 MMHG | SYSTOLIC BLOOD PRESSURE: 121 MMHG | BODY MASS INDEX: 26.61 KG/M2 | TEMPERATURE: 99.5 F

## 2020-06-25 DIAGNOSIS — R10.84 ABDOMINAL PAIN, GENERALIZED: ICD-10-CM

## 2020-06-25 LAB
ALBUMIN SERPL-MCNC: 3.6 G/DL (ref 3.4–5)
ALBUMIN UR-MCNC: NEGATIVE MG/DL
ALP SERPL-CCNC: 92 U/L (ref 40–150)
ALT SERPL W P-5'-P-CCNC: 27 U/L (ref 0–70)
ANION GAP SERPL CALCULATED.3IONS-SCNC: 2 MMOL/L (ref 3–14)
APPEARANCE UR: CLEAR
AST SERPL W P-5'-P-CCNC: 30 U/L (ref 0–45)
BASOPHILS # BLD AUTO: 0 10E9/L (ref 0–0.2)
BASOPHILS NFR BLD AUTO: 0.5 %
BILIRUB SERPL-MCNC: 0.9 MG/DL (ref 0.2–1.3)
BILIRUB UR QL STRIP: NEGATIVE
BUN SERPL-MCNC: 16 MG/DL (ref 7–30)
CALCIUM SERPL-MCNC: 9 MG/DL (ref 8.5–10.1)
CHLORIDE SERPL-SCNC: 101 MMOL/L (ref 94–109)
CO2 SERPL-SCNC: 32 MMOL/L (ref 20–32)
COLOR UR AUTO: YELLOW
CREAT SERPL-MCNC: 0.84 MG/DL (ref 0.66–1.25)
DIFFERENTIAL METHOD BLD: ABNORMAL
EOSINOPHIL # BLD AUTO: 0.2 10E9/L (ref 0–0.7)
EOSINOPHIL NFR BLD AUTO: 2.9 %
ERYTHROCYTE [DISTWIDTH] IN BLOOD BY AUTOMATED COUNT: 12.4 % (ref 10–15)
GFR SERPL CREATININE-BSD FRML MDRD: >90 ML/MIN/{1.73_M2}
GLUCOSE SERPL-MCNC: 76 MG/DL (ref 70–99)
GLUCOSE UR STRIP-MCNC: NEGATIVE MG/DL
HCT VFR BLD AUTO: 39.5 % (ref 40–53)
HGB BLD-MCNC: 13.6 G/DL (ref 13.3–17.7)
HGB UR QL STRIP: NEGATIVE
IMM GRANULOCYTES # BLD: 0.1 10E9/L (ref 0–0.4)
IMM GRANULOCYTES NFR BLD: 0.8 %
KETONES UR STRIP-MCNC: NEGATIVE MG/DL
LEUKOCYTE ESTERASE UR QL STRIP: NEGATIVE
LIPASE SERPL-CCNC: 101 U/L (ref 73–393)
LYMPHOCYTES # BLD AUTO: 2.1 10E9/L (ref 0.8–5.3)
LYMPHOCYTES NFR BLD AUTO: 28.4 %
MCH RBC QN AUTO: 32.5 PG (ref 26.5–33)
MCHC RBC AUTO-ENTMCNC: 34.4 G/DL (ref 31.5–36.5)
MCV RBC AUTO: 94 FL (ref 78–100)
MONOCYTES # BLD AUTO: 0.7 10E9/L (ref 0–1.3)
MONOCYTES NFR BLD AUTO: 9.9 %
NEUTROPHILS # BLD AUTO: 4.2 10E9/L (ref 1.6–8.3)
NEUTROPHILS NFR BLD AUTO: 57.5 %
NITRATE UR QL: NEGATIVE
NRBC # BLD AUTO: 0 10*3/UL
NRBC BLD AUTO-RTO: 0 /100
PH UR STRIP: 8 PH (ref 5–7)
PLATELET # BLD AUTO: 334 10E9/L (ref 150–450)
POTASSIUM SERPL-SCNC: 3.2 MMOL/L (ref 3.4–5.3)
PROT SERPL-MCNC: 7 G/DL (ref 6.8–8.8)
RBC # BLD AUTO: 4.19 10E12/L (ref 4.4–5.9)
SODIUM SERPL-SCNC: 135 MMOL/L (ref 133–144)
SOURCE: ABNORMAL
SP GR UR STRIP: 1.01 (ref 1–1.03)
UROBILINOGEN UR STRIP-MCNC: NORMAL MG/DL (ref 0–2)
WBC # BLD AUTO: 7.4 10E9/L (ref 4–11)

## 2020-06-25 PROCEDURE — 96374 THER/PROPH/DIAG INJ IV PUSH: CPT | Performed by: EMERGENCY MEDICINE

## 2020-06-25 PROCEDURE — 25000132 ZZH RX MED GY IP 250 OP 250 PS 637: Performed by: EMERGENCY MEDICINE

## 2020-06-25 PROCEDURE — 80053 COMPREHEN METABOLIC PANEL: CPT | Performed by: EMERGENCY MEDICINE

## 2020-06-25 PROCEDURE — 74176 CT ABD & PELVIS W/O CONTRAST: CPT

## 2020-06-25 PROCEDURE — 81003 URINALYSIS AUTO W/O SCOPE: CPT | Performed by: EMERGENCY MEDICINE

## 2020-06-25 PROCEDURE — 83690 ASSAY OF LIPASE: CPT | Performed by: EMERGENCY MEDICINE

## 2020-06-25 PROCEDURE — 99285 EMERGENCY DEPT VISIT HI MDM: CPT | Mod: 25 | Performed by: EMERGENCY MEDICINE

## 2020-06-25 PROCEDURE — 96375 TX/PRO/DX INJ NEW DRUG ADDON: CPT | Performed by: EMERGENCY MEDICINE

## 2020-06-25 PROCEDURE — 25000128 H RX IP 250 OP 636: Performed by: EMERGENCY MEDICINE

## 2020-06-25 PROCEDURE — 99285 EMERGENCY DEPT VISIT HI MDM: CPT | Mod: Z6 | Performed by: EMERGENCY MEDICINE

## 2020-06-25 PROCEDURE — 85025 COMPLETE CBC W/AUTO DIFF WBC: CPT | Performed by: EMERGENCY MEDICINE

## 2020-06-25 RX ORDER — ONDANSETRON 4 MG/1
4 TABLET, ORALLY DISINTEGRATING ORAL EVERY 8 HOURS PRN
Qty: 24 TABLET | Refills: 0 | Status: SHIPPED | OUTPATIENT
Start: 2020-06-25 | End: 2020-08-27

## 2020-06-25 RX ORDER — POTASSIUM CHLORIDE 1.5 G/1.58G
20 POWDER, FOR SOLUTION ORAL ONCE
Status: COMPLETED | OUTPATIENT
Start: 2020-06-25 | End: 2020-06-25

## 2020-06-25 RX ORDER — IBUPROFEN 600 MG/1
600 TABLET, FILM COATED ORAL EVERY 6 HOURS PRN
Qty: 30 TABLET | Refills: 0 | Status: SHIPPED | OUTPATIENT
Start: 2020-06-25 | End: 2020-08-27

## 2020-06-25 RX ORDER — ONDANSETRON 2 MG/ML
4 INJECTION INTRAMUSCULAR; INTRAVENOUS EVERY 30 MIN PRN
Status: DISCONTINUED | OUTPATIENT
Start: 2020-06-25 | End: 2020-06-25 | Stop reason: HOSPADM

## 2020-06-25 RX ORDER — MORPHINE SULFATE 4 MG/ML
4 INJECTION, SOLUTION INTRAMUSCULAR; INTRAVENOUS
Status: DISCONTINUED | OUTPATIENT
Start: 2020-06-25 | End: 2020-06-25 | Stop reason: HOSPADM

## 2020-06-25 RX ADMIN — MORPHINE SULFATE 4 MG: 4 INJECTION INTRAVENOUS at 03:03

## 2020-06-25 RX ADMIN — ONDANSETRON 4 MG: 2 INJECTION INTRAMUSCULAR; INTRAVENOUS at 03:03

## 2020-06-25 RX ADMIN — POTASSIUM CHLORIDE 20 MEQ: 1.5 POWDER, FOR SOLUTION ORAL at 04:21

## 2020-06-25 NOTE — ED AVS SNAPSHOT
[facility], [place], Emergency Department  [address] [place] [id number]  Phone:  [phone]                                    Andrew Lockwood   MRN: [id number]    Department:  [facility], Emergency Department   Date of Visit:  6/25/2020           After Visit Summary Signature Page    I have received my discharge instructions, and my questions have been answered. I have discussed any challenges I see with this plan with the nurse or doctor.    ..........................................................................................................................................  Patient/Patient Representative Signature      ..........................................................................................................................................  Patient Representative Print Name and Relationship to Patient    ..................................................               ................................................  Date                                   Time    ..........................................................................................................................................  Reviewed by Signature/Title    ...................................................              ..............................................  Date                                               Time          22EPIC Rev 08/18

## 2020-06-25 NOTE — DISCHARGE INSTRUCTIONS
We were unable to identify the exact cause of your abdominal pain today.  Your CT scan and laboratory work-up was normal.  No signs of serious disease at this time.  Could be caused by a muscle strain and/or some indigestion/constipation.  Nausea medication has been provided.  Prescription for ibuprofen provided as well.  See your doctor return to the emergency department with any new or worsening symptoms.

## 2020-06-25 NOTE — ED TRIAGE NOTES
Pt arrived with RLQ abdominal pain that radiates up and down his right side. It started today. Positive for NV. Says his abdomen feels hard. He has been living in a sober house for 5 months and is completely off drugs. He still smokes cigarettes

## 2020-06-30 ASSESSMENT — ENCOUNTER SYMPTOMS
SHORTNESS OF BREATH: 0
NAUSEA: 0
FREQUENCY: 0
COUGH: 0
EYE PAIN: 0
ARTHRALGIAS: 0
VOMITING: 0
ABDOMINAL DISTENTION: 0
CONSTIPATION: 0
MYALGIAS: 0
ABDOMINAL PAIN: 1
BACK PAIN: 0
DIFFICULTY URINATING: 0
CONFUSION: 0
NECK PAIN: 0
HEADACHES: 0
COLOR CHANGE: 0
DYSURIA: 0
FATIGUE: 0
DIZZINESS: 0
CHILLS: 0
SORE THROAT: 0
PALPITATIONS: 0
DIARRHEA: 0
WEAKNESS: 0
FEVER: 0
CHEST TIGHTNESS: 0

## 2020-06-30 NOTE — ED PROVIDER NOTES
"ED Provider Note  [facility]      History     Chief Complaint   Patient presents with     Abdominal Pain     HPI  Andrew Lockwood is a 52 year old male with history of HIV, diabetes mellitus type 2, well-known to the emergency department presents with a complaint of abdominal pain.  He states the pain started earlier today.  Is located in the right lower quadrant, is diffuse in nature.  He wonders if he might be constipated.  He is not run around his last bowel movement was.  States he has had this pain multiple times before, usually gets admitted until he \"passes a bowel movement\".  Denies any fever/chills, dysuria, melena/hematochezia, diarrhea, chest pain, shortness of breath, URI symptoms, has no other medical complaints.  History of appendectomy.    Past Medical History  Past Medical History:   Diagnosis Date     AIDS (H)      Allergic rhinitis due to other allergen     DNS     Anal dysplasia      Chronic abdominal pain      CNS toxoplasmosis (H)      Diabetes type 2, controlled (H)      GERD (gastroesophageal reflux disease)      HIV (human immunodeficiency virus infection) (H)      Periungual wart      Sleep apnea     doesn't use CPAP     Past Surgical History:   Procedure Laterality Date     C NONSPECIFIC PROCEDURE      right forearm fracture     COLONOSCOPY Left 1/22/2016    Procedure: COMBINED COLONOSCOPY, SINGLE OR MULTIPLE BIOPSY/POLYPECTOMY BY BIOPSY;  Surgeon: Clark Saini MD;  Location: [facility] GI     HC EXPLORE UNDESC TESTIS,INGUIN/SCROTAL       LAPAROSCOPIC APPENDECTOMY N/A 1/31/2018    Procedure: LAPAROSCOPIC APPENDECTOMY;  LAPAROSCOPIC APPENDECTOMY;  Surgeon: Dawn Holt MD;  Location: [facility] OR     LAPAROSCOPY DIAGNOSTIC (GENERAL) N/A 7/26/2016    Procedure: LAPAROSCOPY DIAGNOSTIC (GENERAL);  Surgeon: Susannah Arriaga MD;  Location: [facility] OR     LAPAROSCOPY DIAGNOSTIC (GENERAL) N/A 4/16/2018    Procedure: LAPAROSCOPY DIAGNOSTIC (GENERAL);  Diagnostic " laparoscopy and lysis of adhesions;  Surgeon: Prince Dowling MD;  Location: [facility] OR     OPTICAL TRACKING SYSTEM CRANIOTOMY, EXCISE TUMOR, COMBINED Left 4/10/2015    Procedure: COMBINED OPTICAL TRACKING SYSTEM CRANIOTOMY, EXCISE TUMOR;  Surgeon: Mirlande Colmenares MD;  Location: [facility] OR     REPAIR GAMEKEEPER'S THUMB Right 12/2/2016    Procedure: REPAIR LIGAMENT ULNAR COLLATERAL THUMB (GAMEKEEPER'S);  Surgeon: Evin Zamorano MD;  Location: [facility]     ibuprofen (ADVIL/MOTRIN) 600 MG tablet  ondansetron (ZOFRAN-ODT) 4 MG ODT tab  bictegravir-emtricitabine-tenofovir (BIKTARVY) -25 MG per tablet  blood glucose (NO BRAND SPECIFIED) lancets standard  blood glucose (NO BRAND SPECIFIED) test strip  blood glucose monitoring (NO BRAND SPECIFIED) meter device kit  Ciclopirox 8 % KIT  docusate sodium (COLACE) 50 MG capsule  famotidine (PEPCID) 20 MG tablet  insulin glargine (BASAGLAR KWIKPEN) 100 UNIT/ML pen  insulin lispro (HUMALOG KWIKPEN) 100 UNIT/ML (1 unit dial) KWIKPEN  insulin pen needle (B-D U/F) 31G X 8 MM miscellaneous  insulin pen needle (BD PEN NEEDLE SCOTT 2ND GEN) 32G X 4 MM miscellaneous  Nutritional Supplements (GLUCOSE MANAGEMENT) TABS  polyethylene glycol (MIRALAX) powder  psyllium (METAMUCIL SMOOTH TEXTURE) 28 % packet  sennosides (SENOKOT) 8.6 MG tablet  STATIN NOT PRESCRIBED (INTENTIONAL)      Allergies   Allergen Reactions     Metformin      Abdominal pain     Milk [Lac Bovis] Hives     Dulaglutide Rash     Past medical history, past surgical history, medications, and allergies were reviewed with the patient. Additional pertinent items: None    Family History  Family History   Problem Relation Age of Onset     Diabetes Brother      Diabetes Father      Alzheimer Disease Father      Unknown/Adopted Mother      Diabetes Paternal Grandfather      Cancer No family hx of         no skin cancer     Skin Cancer No family hx of         no famiy hx of skin cancer     Glaucoma No family hx of       Macular Degeneration No family hx of      Family history was reviewed with the patient. Additional pertinent items: None    Social History  Social History     Tobacco Use     Smoking status: Current Some Day Smoker     Packs/day: 0.25     Years: 40.00     Pack years: 10.00     Types: Cigarettes     Smokeless tobacco: Former User   Substance Use Topics     Alcohol use: No     Alcohol/week: 0.0 standard drinks     Comment: Last etoh in 2007     Drug use: Not Currently     Types: Marijuana, Methamphetamines     Comment: Sober 120 days on 2/28/2020      Social history was reviewed with the patient. Additional pertinent items: None    Review of Systems   Constitutional: Negative for chills, fatigue and fever.   HENT: Negative for congestion and sore throat.    Eyes: Negative for pain and visual disturbance.   Respiratory: Negative for cough, chest tightness and shortness of breath.    Cardiovascular: Negative for chest pain and palpitations.   Gastrointestinal: Positive for abdominal pain. Negative for abdominal distention, constipation, diarrhea, nausea and vomiting.   Genitourinary: Negative for difficulty urinating, dysuria, frequency and urgency.   Musculoskeletal: Negative for arthralgias, back pain, myalgias and neck pain.   Skin: Negative for color change and rash.   Neurological: Negative for dizziness, weakness and headaches.   Psychiatric/Behavioral: Negative for confusion.     A complete review of systems was performed with pertinent positives and negatives noted in the HPI, and all other systems negative.    Physical Exam   BP: (!) 118/92  Pulse: 100  Temp: 99.5  F (37.5  C)  Weight: 70.3 kg (155 lb)  SpO2: 96 %  Physical Exam  Vitals signs and nursing note reviewed.   Constitutional:       General: He is not in acute distress.     Appearance: Normal appearance. He is not ill-appearing or toxic-appearing.   HENT:      Head: Normocephalic and atraumatic.      Nose: Nose normal.      Mouth/Throat:      Mouth:  Mucous membranes are moist.   Eyes:      Pupils: Pupils are equal, round, and reactive to light.   Neck:      Musculoskeletal: Normal range of motion. No neck rigidity.   Cardiovascular:      Rate and Rhythm: Normal rate.      Pulses: Normal pulses.      Heart sounds: Normal heart sounds.   Pulmonary:      Effort: Pulmonary effort is normal. No respiratory distress.      Breath sounds: Normal breath sounds.   Abdominal:      General: Abdomen is flat. There is no distension.      Comments: Mild tenderness to palpation the right lower quadrant and right flank.  No guarding rebound or peritoneal signs.  Normal bowel sounds x4.  No overlying skin changes.  No masses palpated.   Musculoskeletal: Normal range of motion.         General: No swelling or deformity.   Skin:     General: Skin is warm.      Capillary Refill: Capillary refill takes less than 2 seconds.   Neurological:      Mental Status: He is alert and oriented to person, place, and time.   Psychiatric:         Mood and Affect: Mood normal.         ED Course      Procedures           Results for orders placed or performed during the hospital encounter of 06/25/20   CT Abdomen Pelvis w/o Contrast     Status: None    Narrative    EXAM: CT ABDOMEN PELVIS W/O CONTRAST  LOCATION: [facility]  DATE/TIME: 6/25/2020 3:36 AM    INDICATION: Right flank pain.  COMPARISON: 04/27/2020.  TECHNIQUE: CT scan of the abdomen and pelvis was performed without IV contrast. Multiplanar reformats were obtained. Dose reduction techniques were used.  CONTRAST: None.    FINDINGS:   LOWER CHEST: Normal.    HEPATOBILIARY: Liver is negative. There are a couple tiny calcified gallstones. No biliary dilatation.    PANCREAS: Normal.    SPLEEN: Normal.    ADRENAL GLANDS: Normal.    KIDNEYS/BLADDER: Normal.    BOWEL: Normal.    LYMPH NODES: Normal.    VASCULATURE: Unremarkable.    PELVIC ORGANS: Penile implant with pump in the left lower quadrant. Prostatic  enlargement.    MUSCULOSKELETAL: Normal.      Impression    IMPRESSION:   1.  No acute abnormalities or CT findings to explain abdominal pain.    2.  Negative for urinary calculi or hydronephrosis.    3.  Normal appendix.    4.  There are a couple tiny calcified gallstones in the gallbladder. No biliary dilatation.    5.  Prostatic enlargement.     CBC with platelets differential     Status: Abnormal   Result Value Ref Range    WBC 7.4 4.0 - 11.0 10e9/L    RBC Count 4.19 (L) 4.4 - 5.9 10e12/L    Hemoglobin 13.6 13.3 - 17.7 g/dL    Hematocrit 39.5 (L) 40.0 - 53.0 %    MCV 94 78 - 100 fl    MCH 32.5 26.5 - 33.0 pg    MCHC 34.4 31.5 - 36.5 g/dL    RDW 12.4 10.0 - 15.0 %    Platelet Count 334 150 - 450 10e9/L    Diff Method Automated Method     % Neutrophils 57.5 %    % Lymphocytes 28.4 %    % Monocytes 9.9 %    % Eosinophils 2.9 %    % Basophils 0.5 %    % Immature Granulocytes 0.8 %    Nucleated RBCs 0 0 /100    Absolute Neutrophil 4.2 1.6 - 8.3 10e9/L    Absolute Lymphocytes 2.1 0.8 - 5.3 10e9/L    Absolute Monocytes 0.7 0.0 - 1.3 10e9/L    Absolute Eosinophils 0.2 0.0 - 0.7 10e9/L    Absolute Basophils 0.0 0.0 - 0.2 10e9/L    Abs Immature Granulocytes 0.1 0 - 0.4 10e9/L    Absolute Nucleated RBC 0.0    Comprehensive metabolic panel     Status: Abnormal   Result Value Ref Range    Sodium 135 133 - 144 mmol/L    Potassium 3.2 (L) 3.4 - 5.3 mmol/L    Chloride 101 94 - 109 mmol/L    Carbon Dioxide 32 20 - 32 mmol/L    Anion Gap 2 (L) 3 - 14 mmol/L    Glucose 76 70 - 99 mg/dL    Urea Nitrogen 16 7 - 30 mg/dL    Creatinine 0.84 0.66 - 1.25 mg/dL    GFR Estimate >90 >60 mL/min/[1.73_m2]    GFR Estimate If Black >90 >60 mL/min/[1.73_m2]    Calcium 9.0 8.5 - 10.1 mg/dL    Bilirubin Total 0.9 0.2 - 1.3 mg/dL    Albumin 3.6 3.4 - 5.0 g/dL    Protein Total 7.0 6.8 - 8.8 g/dL    Alkaline Phosphatase 92 40 - 150 U/L    ALT 27 0 - 70 U/L    AST 30 0 - 45 U/L   Lipase     Status: None   Result Value Ref Range    Lipase 101 73 - 393  U/L   UA reflex to Microscopic and Culture     Status: Abnormal    Specimen: Urine Midstream; Midstream Urine   Result Value Ref Range    Color Urine Yellow     Appearance Urine Clear     Glucose Urine Negative NEG^Negative mg/dL    Bilirubin Urine Negative NEG^Negative    Ketones Urine Negative NEG^Negative mg/dL    Specific Gravity Urine 1.014 1.003 - 1.035    Blood Urine Negative NEG^Negative    pH Urine 8.0 (H) 5.0 - 7.0 pH    Protein Albumin Urine Negative NEG^Negative mg/dL    Urobilinogen mg/dL Normal 0.0 - 2.0 mg/dL    Nitrite Urine Negative NEG^Negative    Leukocyte Esterase Urine Negative NEG^Negative    Source Midstream Urine      Medications   potassium chloride (KLOR-CON) Packet 20 mEq (20 mEq Oral Given 6/25/20 0421)        Assessments & Plan (with Medical Decision Making)   On arrival, patient has normal vital signs.  He is in significant discomfort.  His abdomen is mildly tender in the right lower quadrant and right flank area.  And her physical exam is unremarkable.    Differential diagnosis includes constipation, ureterolithiasis, UTI, diverticulitis, some appendicitis, colitis, gastroenteritis.  Plan for abdominal work-up including CBC, CMP, lipase, urinalysis, CT abdomen pelvis with IV contrast.  Will treat with pain medication antiemetics as needed.    Laboratory work-up is completely unremarkable.  CT scan negative for any signs acute disease.  Mild hypokalemia treated with 20 mEq of p.o. potassium.  On reassessment, patient states his symptoms have resolved.  He is walking around the room, talking on cell phone.  He is in no acute distress at this time.    Unsure the exact etiology of patient's symptoms, however, no signs of significant disease at this time. will be discharged in good condition.  Antiemetics as needed.  Will need PCP follow-up.  Educated on reasons to return to the emergency department.    I have reviewed the nursing notes. I have reviewed the findings, diagnosis, plan and  need for follow up with the patient.    Discharge Medication List as of 6/25/2020  4:52 AM      START taking these medications    Details   ondansetron (ZOFRAN-ODT) 4 MG ODT tab Take 1 tablet (4 mg) by mouth every 8 hours as needed for nausea, Disp-24 tablet,R-0, Local Print             Final diagnoses:   Abdominal pain, generalized       --  Kofi Mauro DO   Emergency Medicine   [facility], EMERGENCY DEPARTMENT  6/25/2020     Kofi Mauro DO  06/30/20 0021

## 2020-07-06 NOTE — TELEPHONE ENCOUNTER
Message was sent to patient's e-mail address to remind him to have repeat CT of the chest to follow up for lung nodules.  Radiology scheduling number was given in message.    Deb Quiroz RN on 7/6/2020 at 9:55 AM

## 2020-07-08 ENCOUNTER — ANCILLARY PROCEDURE (OUTPATIENT)
Dept: CT IMAGING | Facility: CLINIC | Age: 55
End: 2020-07-08
Attending: INTERNAL MEDICINE
Payer: COMMERCIAL

## 2020-07-08 DIAGNOSIS — R91.8 PULMONARY NODULES: ICD-10-CM

## 2020-07-15 NOTE — PROGRESS NOTES
"Andrew Lockwood is a 52 year old male who is being evaluated via a billable video visit.      The patient has been notified of following:     \"This video visit will be conducted via a call between you and your physician/provider. We have found that certain health care needs can be provided without the need for an in-person physical exam.  This service lets us provide the care you need with a video conversation.  If a prescription is necessary we can send it directly to your pharmacy.  If lab work is needed we can place an order for that and you can then stop by our lab to have the test done at a later time.    Video visits are billed at different rates depending on your insurance coverage.  Please reach out to your insurance provider with any questions.    If during the course of the call the physician/provider feels a video visit is not appropriate, you will not be charged for this service.\"    Patient has given verbal consent for Video visit? Yes    How would you like to obtain your AVS? Mail a copy     If you are dropped from the video visit, the video invite should be resent to: Text to cell phone: [phone]     Will anyone else be joining your video visit? No      Video-Visit Details    Type of service:  Video Visit    Distant Location (provider location):  [facility] ENDOCRINOLOGY     Radha Tovar MA    Patients Glucose Data was not able to be obtained. Stated they are good.       NO SHOW for video or telephone visit today.  NO CHARGE.  Ashley Connelly PA-c    "

## 2020-07-16 ENCOUNTER — MEDICAL CORRESPONDENCE (OUTPATIENT)
Dept: HEALTH INFORMATION MANAGEMENT | Facility: CLINIC | Age: 55
End: 2020-07-16

## 2020-07-16 ENCOUNTER — VIRTUAL VISIT (OUTPATIENT)
Dept: ENDOCRINOLOGY | Facility: CLINIC | Age: 55
End: 2020-07-16
Payer: COMMERCIAL

## 2020-07-16 DIAGNOSIS — E11.65 TYPE 2 DIABETES MELLITUS WITH HYPERGLYCEMIA, WITHOUT LONG-TERM CURRENT USE OF INSULIN (H): Primary | ICD-10-CM

## 2020-07-16 NOTE — LETTER
"7/16/2020       RE: Anrdew Lockwood  [address] [address] [facility] [address]     Dear Colleague,    Thank you for referring your patient, Andrew Lockwood, to the [facility] ENDOCRINOLOGY at [facility]. Please see a copy of my visit note below.    Andrew Lockwood is a 52 year old male who is being evaluated via a billable video visit.      The patient has been notified of following:     \"This video visit will be conducted via a call between you and your physician/provider. We have found that certain health care needs can be provided without the need for an in-person physical exam.  This service lets us provide the care you need with a video conversation.  If a prescription is necessary we can send it directly to your pharmacy.  If lab work is needed we can place an order for that and you can then stop by our lab to have the test done at a later time.    Video visits are billed at different rates depending on your insurance coverage.  Please reach out to your insurance provider with any questions.    If during the course of the call the physician/provider feels a video visit is not appropriate, you will not be charged for this service.\"    Patient has given verbal consent for Video visit? Yes    How would you like to obtain your AVS? Mail a copy     If you are dropped from the video visit, the video invite should be resent to: Text to cell phone: [phone]     Will anyone else be joining your video visit? No      Video-Visit Details    Type of service:  Video Visit    Distant Location (provider location):  [facility] ENDOCRINOLOGY     Radha Tovar MA    Patients Glucose Data was not able to be obtained. Stated they are good.       NO SHOW for video or telephone visit today.  NO CHARGE.  Ashley Connelly PA-c      Again, thank you for allowing me to participate in the care of your patient.      Sincerely,    Ashley Connelly PA-C      "

## 2020-07-22 ENCOUNTER — HOSPITAL ENCOUNTER (EMERGENCY)
Facility: CLINIC | Age: 55
Discharge: HOME | End: 2020-07-22
Attending: EMERGENCY MEDICINE | Admitting: EMERGENCY MEDICINE
Payer: COMMERCIAL

## 2020-07-22 ENCOUNTER — APPOINTMENT (OUTPATIENT)
Dept: GENERAL RADIOLOGY | Facility: CLINIC | Age: 55
End: 2020-07-22
Attending: EMERGENCY MEDICINE
Payer: COMMERCIAL

## 2020-07-22 VITALS
DIASTOLIC BLOOD PRESSURE: 91 MMHG | SYSTOLIC BLOOD PRESSURE: 129 MMHG | OXYGEN SATURATION: 96 % | BODY MASS INDEX: 26.61 KG/M2 | HEIGHT: 64 IN | TEMPERATURE: 98.1 F | RESPIRATION RATE: 16 BRPM | HEART RATE: 98 BPM

## 2020-07-22 DIAGNOSIS — K59.00 CONSTIPATION, UNSPECIFIED CONSTIPATION TYPE: ICD-10-CM

## 2020-07-22 LAB
ALBUMIN SERPL-MCNC: 3.3 G/DL (ref 3.4–5)
ALP SERPL-CCNC: 124 U/L (ref 40–150)
ALT SERPL W P-5'-P-CCNC: 29 U/L (ref 0–70)
ANION GAP SERPL CALCULATED.3IONS-SCNC: 4 MMOL/L (ref 3–14)
AST SERPL W P-5'-P-CCNC: 26 U/L (ref 0–45)
BASOPHILS # BLD AUTO: 0.1 10E9/L (ref 0–0.2)
BASOPHILS NFR BLD AUTO: 0.7 %
BILIRUB SERPL-MCNC: 0.5 MG/DL (ref 0.2–1.3)
BUN SERPL-MCNC: 21 MG/DL (ref 7–30)
CALCIUM SERPL-MCNC: 8.7 MG/DL (ref 8.5–10.1)
CHLORIDE SERPL-SCNC: 104 MMOL/L (ref 94–109)
CO2 SERPL-SCNC: 25 MMOL/L (ref 20–32)
CREAT SERPL-MCNC: 0.74 MG/DL (ref 0.66–1.25)
DIFFERENTIAL METHOD BLD: ABNORMAL
EOSINOPHIL # BLD AUTO: 0.3 10E9/L (ref 0–0.7)
EOSINOPHIL NFR BLD AUTO: 3.9 %
ERYTHROCYTE [DISTWIDTH] IN BLOOD BY AUTOMATED COUNT: 12.3 % (ref 10–15)
GFR SERPL CREATININE-BSD FRML MDRD: >90 ML/MIN/{1.73_M2}
GLUCOSE SERPL-MCNC: 268 MG/DL (ref 70–99)
HCT VFR BLD AUTO: 40.2 % (ref 40–53)
HGB BLD-MCNC: 14.1 G/DL (ref 13.3–17.7)
IMM GRANULOCYTES # BLD: 0.1 10E9/L (ref 0–0.4)
IMM GRANULOCYTES NFR BLD: 1.1 %
LIPASE SERPL-CCNC: 172 U/L (ref 73–393)
LYMPHOCYTES # BLD AUTO: 2.2 10E9/L (ref 0.8–5.3)
LYMPHOCYTES NFR BLD AUTO: 30.1 %
MCH RBC QN AUTO: 32.4 PG (ref 26.5–33)
MCHC RBC AUTO-ENTMCNC: 35.1 G/DL (ref 31.5–36.5)
MCV RBC AUTO: 92 FL (ref 78–100)
MONOCYTES # BLD AUTO: 0.6 10E9/L (ref 0–1.3)
MONOCYTES NFR BLD AUTO: 7.6 %
NEUTROPHILS # BLD AUTO: 4.1 10E9/L (ref 1.6–8.3)
NEUTROPHILS NFR BLD AUTO: 56.6 %
NRBC # BLD AUTO: 0 10*3/UL
NRBC BLD AUTO-RTO: 0 /100
PLATELET # BLD AUTO: 323 10E9/L (ref 150–450)
POTASSIUM SERPL-SCNC: 3.4 MMOL/L (ref 3.4–5.3)
PROT SERPL-MCNC: 7.1 G/DL (ref 6.8–8.8)
RBC # BLD AUTO: 4.35 10E12/L (ref 4.4–5.9)
SODIUM SERPL-SCNC: 134 MMOL/L (ref 133–144)
WBC # BLD AUTO: 7.2 10E9/L (ref 4–11)

## 2020-07-22 PROCEDURE — 83690 ASSAY OF LIPASE: CPT | Performed by: EMERGENCY MEDICINE

## 2020-07-22 PROCEDURE — 74019 RADEX ABDOMEN 2 VIEWS: CPT

## 2020-07-22 PROCEDURE — 96361 HYDRATE IV INFUSION ADD-ON: CPT | Performed by: EMERGENCY MEDICINE

## 2020-07-22 PROCEDURE — 25000132 ZZH RX MED GY IP 250 OP 250 PS 637: Performed by: EMERGENCY MEDICINE

## 2020-07-22 PROCEDURE — 96374 THER/PROPH/DIAG INJ IV PUSH: CPT | Performed by: EMERGENCY MEDICINE

## 2020-07-22 PROCEDURE — 80053 COMPREHEN METABOLIC PANEL: CPT | Performed by: EMERGENCY MEDICINE

## 2020-07-22 PROCEDURE — 25800030 ZZH RX IP 258 OP 636: Performed by: EMERGENCY MEDICINE

## 2020-07-22 PROCEDURE — 99285 EMERGENCY DEPT VISIT HI MDM: CPT | Mod: 25 | Performed by: EMERGENCY MEDICINE

## 2020-07-22 PROCEDURE — 85025 COMPLETE CBC W/AUTO DIFF WBC: CPT | Performed by: EMERGENCY MEDICINE

## 2020-07-22 PROCEDURE — 25000125 ZZHC RX 250: Performed by: EMERGENCY MEDICINE

## 2020-07-22 PROCEDURE — 25000128 H RX IP 250 OP 636: Performed by: EMERGENCY MEDICINE

## 2020-07-22 PROCEDURE — 99283 EMERGENCY DEPT VISIT LOW MDM: CPT | Mod: Z6 | Performed by: EMERGENCY MEDICINE

## 2020-07-22 RX ORDER — HYDROMORPHONE HYDROCHLORIDE 1 MG/ML
0.5 INJECTION, SOLUTION INTRAMUSCULAR; INTRAVENOUS; SUBCUTANEOUS
Status: COMPLETED | OUTPATIENT
Start: 2020-07-22 | End: 2020-07-22

## 2020-07-22 RX ORDER — SODIUM CHLORIDE 9 MG/ML
INJECTION, SOLUTION INTRAVENOUS CONTINUOUS
Status: DISCONTINUED | OUTPATIENT
Start: 2020-07-22 | End: 2020-07-22 | Stop reason: HOSPADM

## 2020-07-22 RX ADMIN — DOCUSATE SODIUM 286 ML: 50 LIQUID ORAL at 05:12

## 2020-07-22 RX ADMIN — HYDROMORPHONE HYDROCHLORIDE 0.5 MG: 1 INJECTION, SOLUTION INTRAMUSCULAR; INTRAVENOUS; SUBCUTANEOUS at 03:37

## 2020-07-22 RX ADMIN — LIDOCAINE HYDROCHLORIDE 30 ML: 20 SOLUTION ORAL; TOPICAL at 05:54

## 2020-07-22 RX ADMIN — SODIUM CHLORIDE 1000 ML: 9 INJECTION, SOLUTION INTRAVENOUS at 03:34

## 2020-07-22 ASSESSMENT — ENCOUNTER SYMPTOMS
NAUSEA: 0
VOMITING: 0
ABDOMINAL PAIN: 1
ABDOMINAL DISTENTION: 1
CONSTIPATION: 1

## 2020-07-22 NOTE — ED AVS SNAPSHOT
[facility] [place], Emergency Department  [address] [place] [id number]  Phone:  [phone]                                    Andrew Lockwood   MRN: [id number]    Department:  [facility], Emergency Department   Date of Visit:  7/22/2020           After Visit Summary Signature Page    I have received my discharge instructions, and my questions have been answered. I have discussed any challenges I see with this plan with the nurse or doctor.    ..........................................................................................................................................  Patient/Patient Representative Signature      ..........................................................................................................................................  Patient Representative Print Name and Relationship to Patient    ..................................................               ................................................  Date                                   Time    ..........................................................................................................................................  Reviewed by Signature/Title    ...................................................              ..............................................  Date                                               Time          22EPIC Rev 08/18

## 2020-07-22 NOTE — DISCHARGE INSTRUCTIONS
Please continue your MiraLAX as well as your other constipation medications.  I recommend MiraLAX twice daily if you feel like you are having worsening constipation.

## 2020-07-22 NOTE — ED PROVIDER NOTES
ED Provider Note  [facility]      History     Chief Complaint   Patient presents with     Abdominal Pain     HPI  Andrew Lockwood is a 52 year old male who presents to us with a chief complaint of constipation and abdominal pain.  Patient notes he has had multiple previous similar episodes of severe constipation resulting in abdominal pain.  He notes he is also had obstructions in the past.  He denies any fevers or chills today.  No nausea or vomiting.  Pain is only been present for the last 3 hours.  No trauma.  He states his last bowel movement was approximately 2 days ago.    Past Medical History  Past Medical History:   Diagnosis Date     AIDS (H)      Allergic rhinitis due to other allergen     DNS     Anal dysplasia      Chronic abdominal pain      CNS toxoplasmosis (H)      Diabetes type 2, controlled (H)      GERD (gastroesophageal reflux disease)      HIV (human immunodeficiency virus infection) (H)      Periungual wart      Sleep apnea     doesn't use CPAP     Past Surgical History:   Procedure Laterality Date     C NONSPECIFIC PROCEDURE      right forearm fracture     COLONOSCOPY Left 1/22/2016    Procedure: COMBINED COLONOSCOPY, SINGLE OR MULTIPLE BIOPSY/POLYPECTOMY BY BIOPSY;  Surgeon: Clark Saini MD;  Location: [facility]     HC EXPLORE UNDESC TESTIS,INGUIN/SCROTAL       LAPAROSCOPIC APPENDECTOMY N/A 1/31/2018    Procedure: LAPAROSCOPIC APPENDECTOMY;  LAPAROSCOPIC APPENDECTOMY;  Surgeon: aDwn Holt MD;  Location: [facility] OR     LAPAROSCOPY DIAGNOSTIC (GENERAL) N/A 7/26/2016    Procedure: LAPAROSCOPY DIAGNOSTIC (GENERAL);  Surgeon: Susannah Arriaga MD;  Location: [facility] OR     LAPAROSCOPY DIAGNOSTIC (GENERAL) N/A 4/16/2018    Procedure: LAPAROSCOPY DIAGNOSTIC (GENERAL);  Diagnostic laparoscopy and lysis of adhesions;  Surgeon: Prince Dowling MD;  Location: [facility] OR     OPTICAL TRACKING SYSTEM CRANIOTOMY, EXCISE TUMOR, COMBINED Left 4/10/2015    Procedure:  COMBINED OPTICAL TRACKING SYSTEM CRANIOTOMY, EXCISE TUMOR;  Surgeon: Mirlande Colmenares MD;  Location: [facility] OR     REPAIR GAMEKEEPER'S THUMB Right 12/2/2016    Procedure: REPAIR LIGAMENT ULNAR COLLATERAL THUMB (GAMEKEEPER'S);  Surgeon: Evin Zamorano MD;  Location:  OR     bictegravir-emtricitabine-tenofovir (BIKTARVY) -25 MG per tablet  blood glucose (NO BRAND SPECIFIED) lancets standard  blood glucose (NO BRAND SPECIFIED) test strip  blood glucose monitoring (NO BRAND SPECIFIED) meter device kit  Ciclopirox 8 % KIT  docusate sodium (COLACE) 50 MG capsule  famotidine (PEPCID) 20 MG tablet  ibuprofen (ADVIL/MOTRIN) 600 MG tablet  insulin glargine (BASAGLAR KWIKPEN) 100 UNIT/ML pen  insulin lispro (HUMALOG KWIKPEN) 100 UNIT/ML (1 unit dial) KWIKPEN  insulin pen needle (B-D U/F) 31G X 8 MM miscellaneous  insulin pen needle (BD PEN NEEDLE SCOTT 2ND GEN) 32G X 4 MM miscellaneous  Nutritional Supplements (GLUCOSE MANAGEMENT) TABS  ondansetron (ZOFRAN-ODT) 4 MG ODT tab  polyethylene glycol (MIRALAX) powder  psyllium (METAMUCIL SMOOTH TEXTURE) 28 % packet  sennosides (SENOKOT) 8.6 MG tablet  STATIN NOT PRESCRIBED (INTENTIONAL)      Allergies   Allergen Reactions     Metformin      Abdominal pain     Milk [Lac Bovis] Hives     Dulaglutide Rash     Past medical history, past surgical history, medications, and allergies were reviewed with the patient. Additional pertinent items: None    Family History  Family History   Problem Relation Age of Onset     Diabetes Brother      Diabetes Father      Alzheimer Disease Father      Unknown/Adopted Mother      Diabetes Paternal Grandfather      Cancer No family hx of         no skin cancer     Skin Cancer No family hx of         no famiy hx of skin cancer     Glaucoma No family hx of      Macular Degeneration No family hx of      Family history was reviewed with the patient. Additional pertinent items: None    Social History  Social History     Tobacco Use     Smoking  "status: Current Some Day Smoker     Packs/day: 0.25     Years: 40.00     Pack years: 10.00     Types: Cigarettes     Smokeless tobacco: Former User   Substance Use Topics     Alcohol use: No     Alcohol/week: 0.0 standard drinks     Comment: Last etoh in 2007     Drug use: Not Currently     Types: Marijuana, Methamphetamines     Comment: Sober 120 days on 2/28/2020      Social history was reviewed with the patient. Additional pertinent items: None    Review of Systems   Gastrointestinal: Positive for abdominal distention, abdominal pain and constipation. Negative for nausea and vomiting.     A complete review of systems was performed with pertinent positives and negatives noted in the HPI, and all other systems negative.    Physical Exam   BP: (!) 142/93  Pulse: 89  Temp: 98.1  F (36.7  C)  Resp: 16  Height: 162.6 cm (5' 4\")  SpO2: 96 %  Physical Exam  Constitutional:       General: He is not in acute distress.     Appearance: He is not diaphoretic.   HENT:      Head: Normocephalic.      Mouth/Throat:      Pharynx: No oropharyngeal exudate.   Eyes:      Extraocular Movements: Extraocular movements intact.   Neck:      Musculoskeletal: Neck supple.   Cardiovascular:      Heart sounds: Normal heart sounds.   Pulmonary:      Effort: No respiratory distress.      Breath sounds: Normal breath sounds.   Abdominal:      General: There is distension.      Palpations: Abdomen is soft.      Tenderness: There is generalized abdominal tenderness.   Musculoskeletal:         General: No deformity.   Skin:     General: Skin is dry.   Neurological:      Mental Status: He is alert.      Comments: alert   Psychiatric:         Behavior: Behavior normal.         ED Course      Procedures         Results for orders placed or performed during the hospital encounter of 07/22/20   XR Abdomen 2 Views     Status: None    Narrative    EXAM: ABDOMEN 2 VIEWS  LOCATION: [facility]  DATE/TIME: 7/22/2020 3:27 AM    INDICATION: " Abdominal pain and constipation.    COMPARISON: None.    FINDINGS: Gas is scattered within nondilated large and small bowel. A moderate amount of stool is present in the colon. No visualized bowel wall thickening, pneumatosis or free intraperitoneal gas.      Impression    IMPRESSION:  1. A moderate amount of stool is present in the colon.  2. No evidence of bowel obstruction.     CBC with platelets differential     Status: Abnormal   Result Value Ref Range    WBC 7.2 4.0 - 11.0 10e9/L    RBC Count 4.35 (L) 4.4 - 5.9 10e12/L    Hemoglobin 14.1 13.3 - 17.7 g/dL    Hematocrit 40.2 40.0 - 53.0 %    MCV 92 78 - 100 fl    MCH 32.4 26.5 - 33.0 pg    MCHC 35.1 31.5 - 36.5 g/dL    RDW 12.3 10.0 - 15.0 %    Platelet Count 323 150 - 450 10e9/L    Diff Method Automated Method     % Neutrophils 56.6 %    % Lymphocytes 30.1 %    % Monocytes 7.6 %    % Eosinophils 3.9 %    % Basophils 0.7 %    % Immature Granulocytes 1.1 %    Nucleated RBCs 0 0 /100    Absolute Neutrophil 4.1 1.6 - 8.3 10e9/L    Absolute Lymphocytes 2.2 0.8 - 5.3 10e9/L    Absolute Monocytes 0.6 0.0 - 1.3 10e9/L    Absolute Eosinophils 0.3 0.0 - 0.7 10e9/L    Absolute Basophils 0.1 0.0 - 0.2 10e9/L    Abs Immature Granulocytes 0.1 0 - 0.4 10e9/L    Absolute Nucleated RBC 0.0    Comprehensive metabolic panel     Status: Abnormal   Result Value Ref Range    Sodium 134 133 - 144 mmol/L    Potassium 3.4 3.4 - 5.3 mmol/L    Chloride 104 94 - 109 mmol/L    Carbon Dioxide 25 20 - 32 mmol/L    Anion Gap 4 3 - 14 mmol/L    Glucose 268 (H) 70 - 99 mg/dL    Urea Nitrogen 21 7 - 30 mg/dL    Creatinine 0.74 0.66 - 1.25 mg/dL    GFR Estimate >90 >60 mL/min/[1.73_m2]    GFR Estimate If Black >90 >60 mL/min/[1.73_m2]    Calcium 8.7 8.5 - 10.1 mg/dL    Bilirubin Total 0.5 0.2 - 1.3 mg/dL    Albumin 3.3 (L) 3.4 - 5.0 g/dL    Protein Total 7.1 6.8 - 8.8 g/dL    Alkaline Phosphatase 124 40 - 150 U/L    ALT 29 0 - 70 U/L    AST 26 0 - 45 U/L   Lipase     Status: None   Result Value  Ref Range    Lipase 172 73 - 393 U/L     Medications   0.9% sodium chloride BOLUS (0 mLs Intravenous Stopped 7/22/20 0434)     Followed by   sodium chloride 0.9% infusion ( Intravenous Not Given 7/22/20 0556)   HYDROmorphone (PF) (DILAUDID) injection 0.5 mg (0.5 mg Intravenous Given 7/22/20 0337)   pink lady enema (COMPOUNDED: docusate, magnesium citrate, mineral oil, sodium phosphate) (286 mLs Rectal Given 7/22/20 0512)   lidocaine (XYLOCAINE) 2 % 15 mL, alum & mag hydroxide-simethicone (MAALOX  ES) 15 mL GI Cocktail (30 mLs Oral Given 7/22/20 0554)     Results for orders placed or performed during the hospital encounter of 07/22/20   XR Abdomen 2 Views     Status: None    Narrative    EXAM: ABDOMEN 2 VIEWS  LOCATION: [facility]  DATE/TIME: 7/22/2020 3:27 AM    INDICATION: Abdominal pain and constipation.    COMPARISON: None.    FINDINGS: Gas is scattered within nondilated large and small bowel. A moderate amount of stool is present in the colon. No visualized bowel wall thickening, pneumatosis or free intraperitoneal gas.      Impression    IMPRESSION:  1. A moderate amount of stool is present in the colon.  2. No evidence of bowel obstruction.     CBC with platelets differential     Status: Abnormal   Result Value Ref Range    WBC 7.2 4.0 - 11.0 10e9/L    RBC Count 4.35 (L) 4.4 - 5.9 10e12/L    Hemoglobin 14.1 13.3 - 17.7 g/dL    Hematocrit 40.2 40.0 - 53.0 %    MCV 92 78 - 100 fl    MCH 32.4 26.5 - 33.0 pg    MCHC 35.1 31.5 - 36.5 g/dL    RDW 12.3 10.0 - 15.0 %    Platelet Count 323 150 - 450 10e9/L    Diff Method Automated Method     % Neutrophils 56.6 %    % Lymphocytes 30.1 %    % Monocytes 7.6 %    % Eosinophils 3.9 %    % Basophils 0.7 %    % Immature Granulocytes 1.1 %    Nucleated RBCs 0 0 /100    Absolute Neutrophil 4.1 1.6 - 8.3 10e9/L    Absolute Lymphocytes 2.2 0.8 - 5.3 10e9/L    Absolute Monocytes 0.6 0.0 - 1.3 10e9/L    Absolute Eosinophils 0.3 0.0 - 0.7 10e9/L    Absolute Basophils  0.1 0.0 - 0.2 10e9/L    Abs Immature Granulocytes 0.1 0 - 0.4 10e9/L    Absolute Nucleated RBC 0.0    Comprehensive metabolic panel     Status: Abnormal   Result Value Ref Range    Sodium 134 133 - 144 mmol/L    Potassium 3.4 3.4 - 5.3 mmol/L    Chloride 104 94 - 109 mmol/L    Carbon Dioxide 25 20 - 32 mmol/L    Anion Gap 4 3 - 14 mmol/L    Glucose 268 (H) 70 - 99 mg/dL    Urea Nitrogen 21 7 - 30 mg/dL    Creatinine 0.74 0.66 - 1.25 mg/dL    GFR Estimate >90 >60 mL/min/[1.73_m2]    GFR Estimate If Black >90 >60 mL/min/[1.73_m2]    Calcium 8.7 8.5 - 10.1 mg/dL    Bilirubin Total 0.5 0.2 - 1.3 mg/dL    Albumin 3.3 (L) 3.4 - 5.0 g/dL    Protein Total 7.1 6.8 - 8.8 g/dL    Alkaline Phosphatase 124 40 - 150 U/L    ALT 29 0 - 70 U/L    AST 26 0 - 45 U/L   Lipase     Status: None   Result Value Ref Range    Lipase 172 73 - 393 U/L          Assessments & Plan (with Medical Decision Making)   52-year-old male presents with a chief complaint of abdominal pain.  Differential includes but not limited to constipation, bowel obstruction, pancreatitis, cholecystitis, gastritis.  Vital signs today were normal.  CBC and CMP as well as lipase are normal today.  Constipation without any evidence of obstruction were seen on x-ray.  Patient was given a GI cocktail with some pain medication and a enema.  He did have some degree of a bowel movement and felt improved.  We will discharge him to follow-up with primary care and return to us as needed.    I have reviewed the nursing notes. I have reviewed the findings, diagnosis, plan and need for follow up with the patient.    New Prescriptions    No medications on file       Final diagnoses:   Constipation, unspecified constipation type       --  Shaw Stewart,    Emergency Medicine   [facility], EMERGENCY DEPARTMENT  7/22/2020     Shaw Stewart,   07/22/20 0558

## 2020-07-29 DIAGNOSIS — K59.00 CONSTIPATION, UNSPECIFIED CONSTIPATION TYPE: ICD-10-CM

## 2020-07-30 ENCOUNTER — HOSPITAL ENCOUNTER (EMERGENCY)
Facility: CLINIC | Age: 55
Discharge: HOME | End: 2020-07-30
Attending: EMERGENCY MEDICINE | Admitting: EMERGENCY MEDICINE
Payer: COMMERCIAL

## 2020-07-30 ENCOUNTER — APPOINTMENT (OUTPATIENT)
Dept: GENERAL RADIOLOGY | Facility: CLINIC | Age: 55
End: 2020-07-30
Attending: EMERGENCY MEDICINE
Payer: COMMERCIAL

## 2020-07-30 VITALS
TEMPERATURE: 98.2 F | DIASTOLIC BLOOD PRESSURE: 62 MMHG | WEIGHT: 195 LBS | BODY MASS INDEX: 33.29 KG/M2 | SYSTOLIC BLOOD PRESSURE: 102 MMHG | OXYGEN SATURATION: 99 % | HEIGHT: 64 IN | RESPIRATION RATE: 16 BRPM | HEART RATE: 75 BPM

## 2020-07-30 DIAGNOSIS — K59.00 CONSTIPATION, UNSPECIFIED CONSTIPATION TYPE: ICD-10-CM

## 2020-07-30 LAB
ALBUMIN SERPL-MCNC: 3.5 G/DL (ref 3.4–5)
ALP SERPL-CCNC: 95 U/L (ref 40–150)
ALT SERPL W P-5'-P-CCNC: 24 U/L (ref 0–70)
ANION GAP SERPL CALCULATED.3IONS-SCNC: 5 MMOL/L (ref 3–14)
AST SERPL W P-5'-P-CCNC: 17 U/L (ref 0–45)
BASOPHILS # BLD AUTO: 0.1 10E9/L (ref 0–0.2)
BASOPHILS NFR BLD AUTO: 0.7 %
BILIRUB SERPL-MCNC: 0.7 MG/DL (ref 0.2–1.3)
BUN SERPL-MCNC: 15 MG/DL (ref 7–30)
CALCIUM SERPL-MCNC: 9.3 MG/DL (ref 8.5–10.1)
CHLORIDE SERPL-SCNC: 108 MMOL/L (ref 94–109)
CO2 SERPL-SCNC: 27 MMOL/L (ref 20–32)
CREAT SERPL-MCNC: 0.8 MG/DL (ref 0.66–1.25)
DIFFERENTIAL METHOD BLD: NORMAL
EOSINOPHIL # BLD AUTO: 0.2 10E9/L (ref 0–0.7)
EOSINOPHIL NFR BLD AUTO: 3.1 %
ERYTHROCYTE [DISTWIDTH] IN BLOOD BY AUTOMATED COUNT: 12.4 % (ref 10–15)
GFR SERPL CREATININE-BSD FRML MDRD: >90 ML/MIN/{1.73_M2}
GLUCOSE SERPL-MCNC: 94 MG/DL (ref 70–99)
HCT VFR BLD AUTO: 41.4 % (ref 40–53)
HGB BLD-MCNC: 14.3 G/DL (ref 13.3–17.7)
IMM GRANULOCYTES # BLD: 0.1 10E9/L (ref 0–0.4)
IMM GRANULOCYTES NFR BLD: 0.8 %
LIPASE SERPL-CCNC: 104 U/L (ref 73–393)
LYMPHOCYTES # BLD AUTO: 2.2 10E9/L (ref 0.8–5.3)
LYMPHOCYTES NFR BLD AUTO: 30.9 %
MCH RBC QN AUTO: 31.8 PG (ref 26.5–33)
MCHC RBC AUTO-ENTMCNC: 34.5 G/DL (ref 31.5–36.5)
MCV RBC AUTO: 92 FL (ref 78–100)
MONOCYTES # BLD AUTO: 0.6 10E9/L (ref 0–1.3)
MONOCYTES NFR BLD AUTO: 8.9 %
NEUTROPHILS # BLD AUTO: 4 10E9/L (ref 1.6–8.3)
NEUTROPHILS NFR BLD AUTO: 55.6 %
NRBC # BLD AUTO: 0 10*3/UL
NRBC BLD AUTO-RTO: 0 /100
PLATELET # BLD AUTO: 296 10E9/L (ref 150–450)
POTASSIUM SERPL-SCNC: 3.4 MMOL/L (ref 3.4–5.3)
PROT SERPL-MCNC: 7.4 G/DL (ref 6.8–8.8)
RBC # BLD AUTO: 4.49 10E12/L (ref 4.4–5.9)
SODIUM SERPL-SCNC: 139 MMOL/L (ref 133–144)
WBC # BLD AUTO: 7.1 10E9/L (ref 4–11)

## 2020-07-30 PROCEDURE — 83690 ASSAY OF LIPASE: CPT | Performed by: EMERGENCY MEDICINE

## 2020-07-30 PROCEDURE — 25000128 H RX IP 250 OP 636: Performed by: EMERGENCY MEDICINE

## 2020-07-30 PROCEDURE — 80053 COMPREHEN METABOLIC PANEL: CPT | Performed by: EMERGENCY MEDICINE

## 2020-07-30 PROCEDURE — 74019 RADEX ABDOMEN 2 VIEWS: CPT

## 2020-07-30 PROCEDURE — 96375 TX/PRO/DX INJ NEW DRUG ADDON: CPT

## 2020-07-30 PROCEDURE — 99284 EMERGENCY DEPT VISIT MOD MDM: CPT | Mod: Z6 | Performed by: EMERGENCY MEDICINE

## 2020-07-30 PROCEDURE — 99284 EMERGENCY DEPT VISIT MOD MDM: CPT | Mod: 25

## 2020-07-30 PROCEDURE — 85025 COMPLETE CBC W/AUTO DIFF WBC: CPT | Performed by: EMERGENCY MEDICINE

## 2020-07-30 PROCEDURE — 96365 THER/PROPH/DIAG IV INF INIT: CPT

## 2020-07-30 RX ORDER — ONDANSETRON 2 MG/ML
4 INJECTION INTRAMUSCULAR; INTRAVENOUS EVERY 30 MIN PRN
Status: DISCONTINUED | OUTPATIENT
Start: 2020-07-30 | End: 2020-07-30 | Stop reason: HOSPADM

## 2020-07-30 RX ADMIN — ONDANSETRON 4 MG: 2 INJECTION INTRAMUSCULAR; INTRAVENOUS at 13:03

## 2020-07-30 RX ADMIN — FAMOTIDINE 20 MG: 20 INJECTION, SOLUTION INTRAVENOUS at 13:03

## 2020-07-30 ASSESSMENT — ENCOUNTER SYMPTOMS
ABDOMINAL PAIN: 1
FEVER: 0
DIARRHEA: 1
VOMITING: 0
NAUSEA: 1

## 2020-07-30 ASSESSMENT — MIFFLIN-ST. JEOR: SCORE: 1635.51

## 2020-07-30 NOTE — ED AVS SNAPSHOT
[facility], [place], Emergency Department  [address] [place] [id number]  Phone:  [phone]                                    Andrew Lockwood   MRN: [id number]    Department:  [facility], Emergency Department   Date of Visit:  7/30/2020           After Visit Summary Signature Page    I have received my discharge instructions, and my questions have been answered. I have discussed any challenges I see with this plan with the nurse or doctor.    ..........................................................................................................................................  Patient/Patient Representative Signature      ..........................................................................................................................................  Patient Representative Print Name and Relationship to Patient    ..................................................               ................................................  Date                                   Time    ..........................................................................................................................................  Reviewed by Signature/Title    ...................................................              ..............................................  Date                                               Time          22EPIC Rev 08/18

## 2020-07-30 NOTE — ED PROVIDER NOTES
[place] EMERGENCY DEPARTMENT ([facility])  7/30/20    History     Chief Complaint   Patient presents with     Abdominal Pain     Nausea & Vomiting     The history is provided by the patient and medical records.     Andrew Locwkood is a 52 year old male with a past medical history significant for HIV/AIDS, DM 2, chronic abdominal pain, DANISHA and GERD who presents here to the Emergency Department due to abdominal pain and nausea.  Patient reports that he has been having mid abdominal pain for the past 1 day.  Describes this as a cramping sensation.  Patient states he last had this pain 2 weeks ago.  States his last BM was yesterday and this was diarrhea.  Denies any bowel movements today.  Patient has been nauseous but denies vomiting.  Patient has not eaten today but ate barbecue chicken yesterday for lunch.  He denies fevers.  Patient has been compliant with his HIV medications.  Patient says he has had similar pain on and off for the past 2 years.    T Cell Subset:  Absolute CD4   Date Value Ref Range Status   02/11/2020 490 441 - 2,156 cells/uL Final     CD4 [place] T   Date Value Ref Range Status   02/11/2020 17 (L) 28 - 63 % Final     CD8 Suppressor T   Date Value Ref Range Status   02/11/2020 37 10 - 40 % Final     CD3 Mature T   Date Value Ref Range Status   02/11/2020 57 49 - 84 % Final     CD4:CD8 Ratio   Date Value Ref Range Status   02/11/2020 0.46 (L) 1.40 - 2.60 Final     WBC   Date Value Ref Range Status   07/22/2020 7.2 4.0 - 11.0 10e9/L Final     % Lymphocytes   Date Value Ref Range Status   07/22/2020 30.1 % Final     Absolute CD3   Date Value Ref Range Status   02/11/2020 1,598 603 - 2,990 cells/uL Final     Absolute CD8   Date Value Ref Range Status   02/11/2020 1,036 125 - 1,312 cells/uL Final       HIV-1 RNA Quantitative:  HIV-1 RNA Quant Result   Date Value Ref Range Status   02/11/2020 HIV-1 RNA Not Detected HIVND^HIV-1 RNA Not Detected [Copies]/mL Final     Comment:     The DAMON  AmpliPrep/DAMON TaqMan HIV-1 test is an FDA-approved in vitro   nucleic acid amplification test for the quantitation of HIV-1 RNA in human   plasma (EDTA plasma) using the DAMON AmpliPrep instrument for automated viral   nucleic acid extraction and the DAMON TaqMan Analyzer or DAMON TaqMan for   automated Real Time PCR amplification and detection of the viral nucleic acid   target.  Titer results are reported in copies/ml. This assay is intended for use in   conjunction with clinical presentation and other laboratory markers of disease   prognosis and for use as an aid in assessing viral response to antiretroviral   treatment as measured by changes in plasma HIV-1 RNA levels. This test should   not be used as a donor screening test to confirm the presence of HIV-1   infection.       I have reviewed the Medications, Allergies, Past Medical and Surgical History, and Social History in the [employer] system.  PAST MEDICAL HISTORY:   Past Medical History:   Diagnosis Date     AIDS (H)      Allergic rhinitis due to other allergen     DNS     Anal dysplasia      Chronic abdominal pain      CNS toxoplasmosis (H)      Diabetes type 2, controlled (H)      GERD (gastroesophageal reflux disease)      HIV (human immunodeficiency virus infection) (H)      Periungual wart      Sleep apnea     doesn't use CPAP       PAST SURGICAL HISTORY:   Past Surgical History:   Procedure Laterality Date     C NONSPECIFIC PROCEDURE      right forearm fracture     COLONOSCOPY Left 1/22/2016    Procedure: COMBINED COLONOSCOPY, SINGLE OR MULTIPLE BIOPSY/POLYPECTOMY BY BIOPSY;  Surgeon: Clark Saini MD;  Location: [facility]     HC EXPLORE UNDESC TESTIS,INGUIN/SCROTAL       LAPAROSCOPIC APPENDECTOMY N/A 1/31/2018    Procedure: LAPAROSCOPIC APPENDECTOMY;  LAPAROSCOPIC APPENDECTOMY;  Surgeon: Dawn Holt MD;  Location: [facility] OR     LAPAROSCOPY DIAGNOSTIC (GENERAL) N/A 7/26/2016    Procedure: LAPAROSCOPY DIAGNOSTIC (GENERAL);  Surgeon:  Susannah Arriaga MD;  Location: [facility] OR     LAPAROSCOPY DIAGNOSTIC (GENERAL) N/A 4/16/2018    Procedure: LAPAROSCOPY DIAGNOSTIC (GENERAL);  Diagnostic laparoscopy and lysis of adhesions;  Surgeon: Prince Dowling MD;  Location: [facility] OR     OPTICAL TRACKING SYSTEM CRANIOTOMY, EXCISE TUMOR, COMBINED Left 4/10/2015    Procedure: COMBINED OPTICAL TRACKING SYSTEM CRANIOTOMY, EXCISE TUMOR;  Surgeon: Mirlande Colmenares MD;  Location: [facility] OR     REPAIR GAMEKEEPER'S THUMB Right 12/2/2016    Procedure: REPAIR LIGAMENT ULNAR COLLATERAL THUMB (GAMEKEEPER'S);  Surgeon: Evin Zamorano MD;  [facility]       Past medical history, past surgical history, medications, and allergies were reviewed with the patient. Additional pertinent items: None    FAMILY HISTORY:   Family History   Problem Relation Age of Onset     Diabetes Brother      Diabetes Father      Alzheimer Disease Father      Unknown/Adopted Mother      Diabetes Paternal Grandfather      Cancer No family hx of         no skin cancer     Skin Cancer No family hx of         no famiy hx of skin cancer     Glaucoma No family hx of      Macular Degeneration No family hx of        SOCIAL HISTORY:   Social History     Tobacco Use     Smoking status: Current Some Day Smoker     Packs/day: 0.25     Years: 40.00     Pack years: 10.00     Types: Cigarettes     Smokeless tobacco: Former User   Substance Use Topics     Alcohol use: No     Alcohol/week: 0.0 standard drinks     Comment: Last etoh in 2007     Social history was reviewed with the patient. Additional pertinent items: None      Patient's Medications   New Prescriptions    No medications on file   Previous Medications    BICTEGRAVIR-EMTRICITABINE-TENOFOVIR (BIKTARVY) -25 MG PER TABLET    Take 1 tablet by mouth daily    BLOOD GLUCOSE (NO BRAND SPECIFIED) LANCETS STANDARD    Use to test blood sugar four times daily or as directed.    BLOOD GLUCOSE (NO BRAND SPECIFIED) TEST STRIP    1 strip by In Vitro  route 4 times daily (before meals and nightly) Use to test blood sugars 4 times daily or as directed    BLOOD GLUCOSE MONITORING (NO BRAND SPECIFIED) METER DEVICE KIT    Use to test blood sugar 4 times daily or as directed.    CICLOPIROX 8 % KIT    Externally apply 1 Application topically daily In evening, remove from nails once weekly with alcohol    DOCUSATE SODIUM (COLACE) 50 MG CAPSULE    Take 2 capsules (100 mg) by mouth 2 times daily as needed for constipation    FAMOTIDINE (PEPCID) 20 MG TABLET    Take 1 tablet (20 mg) by mouth 2 times daily    IBUPROFEN (ADVIL/MOTRIN) 600 MG TABLET    Take 1 tablet (600 mg) by mouth every 6 hours as needed for moderate pain    INSULIN GLARGINE (BASAGLAR KWIKPEN) 100 UNIT/ML PEN    INJECT 30 UNITS SUBCUTANEOUSLY TWICE DAILY *DISCARD PEN 28 DAYS AFTER FIRST USE, DO NOT REFRIGERATE AFTER FIRST USE*    INSULIN LISPRO (HUMALOG KWIKPEN) 100 UNIT/ML (1 UNIT DIAL) KWIKPEN    INJECT 1-7 UNITS SUBCUTANEOUSLY THREE TIMES DAILY BEFORE MEALS PLUS SLIDING SCALE (MAX DAILY DOSE IS 72 UNITS)    INSULIN PEN NEEDLE (B-D U/F) 31G X 8 MM MISCELLANEOUS    Use 1 pen needles daily or as directed.    INSULIN PEN NEEDLE (BD PEN NEEDLE SCOTT 2ND GEN) 32G X 4 MM MISCELLANEOUS    Use 3-4 daily.    NUTRITIONAL SUPPLEMENTS (GLUCOSE MANAGEMENT) TABS    Use 1-2 tabs for hypoglycemia.    ONDANSETRON (ZOFRAN-ODT) 4 MG ODT TAB    Take 1 tablet (4 mg) by mouth every 8 hours as needed for nausea    POLYETHYLENE GLYCOL (MIRALAX) POWDER    Take 17 g (1 capful) by mouth daily as needed for constipation    PSYLLIUM (METAMUCIL SMOOTH TEXTURE) 28 % PACKET    Use one packet in 8oz water daily.    SENNOSIDES (SENOKOT) 8.6 MG TABLET    Take 1 tablet by mouth 2 times daily as needed for constipation    STATIN NOT PRESCRIBED (INTENTIONAL)    Please choose reason not prescribed, below   Modified Medications    No medications on file   Discontinued Medications    No medications on file          Allergies   Allergen Reactions  "    Metformin      Abdominal pain     Milk [Lac Bovis] Hives     Dulaglutide Rash        Review of Systems   Constitutional: Negative for fever.   Gastrointestinal: Positive for abdominal pain, diarrhea and nausea. Negative for vomiting.   All other systems reviewed and are negative.    Physical Exam   BP: (!) 123/93  Pulse: 106  Temp: 98.2  F (36.8  C)  Resp: 18  Height: 162.6 cm (5' 4\")  Weight: 88.5 kg (195 lb)  SpO2: 98 %      Physical Exam  Constitutional:       General: He is not in acute distress.     Appearance: Normal appearance. He is well-developed. He is not ill-appearing or toxic-appearing.   HENT:      Head: Normocephalic and atraumatic.      Mouth/Throat:      Mouth: Mucous membranes are moist.   Neck:      Musculoskeletal: Normal range of motion and neck supple.   Cardiovascular:      Rate and Rhythm: Normal rate and regular rhythm.      Heart sounds: Normal heart sounds.   Pulmonary:      Effort: Pulmonary effort is normal. No respiratory distress.      Breath sounds: No wheezing.   Abdominal:      General: There is no distension.      Palpations: Abdomen is soft. There is no mass.      Tenderness: There is no abdominal tenderness. There is no rebound.      Hernia: No hernia is present.   Musculoskeletal:         General: No swelling.   Skin:     General: Skin is warm.   Neurological:      Mental Status: He is alert and oriented to person, place, and time.   Psychiatric:         Behavior: Behavior normal.         Thought Content: Thought content normal.         ED Course   12:23 PM  The patient was seen and examined by Analilia Galeas MD in Room ED15.        Procedures                       Results for orders placed or performed during the hospital encounter of 07/30/20   XR Abdomen 2 Views     Status: None    Narrative    Exam: XR ABDOMEN 2 VW, 7/30/2020 2:05 PM    Indication: abd pain    Comparison: Plain films 7/22/2020    Findings:   3 views of the abdomen. Nonobstructive bowel gas pattern. " Moderate  stool burden. No portal venous gas or pneumatosis. No  pneumoperitoneum. Lung bases are clear. Heart size is within normal  limits. No acute osseous abnormality. Soft tissue is unremarkable.  Partial visualization of  penile  implant device.       Impression    Impression: Nonobstructive bowel gas pattern with moderate stool  burden.    I have personally reviewed the examination and initial interpretation  and I agree with the findings.    MYAH WHITE MD   Comprehensive metabolic panel     Status: None   Result Value Ref Range    Sodium 139 133 - 144 mmol/L    Potassium 3.4 3.4 - 5.3 mmol/L    Chloride 108 94 - 109 mmol/L    Carbon Dioxide 27 20 - 32 mmol/L    Anion Gap 5 3 - 14 mmol/L    Glucose 94 70 - 99 mg/dL    Urea Nitrogen 15 7 - 30 mg/dL    Creatinine 0.80 0.66 - 1.25 mg/dL    GFR Estimate >90 >60 mL/min/[1.73_m2]    GFR Estimate If Black >90 >60 mL/min/[1.73_m2]    Calcium 9.3 8.5 - 10.1 mg/dL    Bilirubin Total 0.7 0.2 - 1.3 mg/dL    Albumin 3.5 3.4 - 5.0 g/dL    Protein Total 7.4 6.8 - 8.8 g/dL    Alkaline Phosphatase 95 40 - 150 U/L    ALT 24 0 - 70 U/L    AST 17 0 - 45 U/L   Lipase     Status: None   Result Value Ref Range    Lipase 104 73 - 393 U/L   CBC with platelets differential     Status: None   Result Value Ref Range    WBC 7.1 4.0 - 11.0 10e9/L    RBC Count 4.49 4.4 - 5.9 10e12/L    Hemoglobin 14.3 13.3 - 17.7 g/dL    Hematocrit 41.4 40.0 - 53.0 %    MCV 92 78 - 100 fl    MCH 31.8 26.5 - 33.0 pg    MCHC 34.5 31.5 - 36.5 g/dL    RDW 12.4 10.0 - 15.0 %    Platelet Count 296 150 - 450 10e9/L    Diff Method Automated Method     % Neutrophils 55.6 %    % Lymphocytes 30.9 %    % Monocytes 8.9 %    % Eosinophils 3.1 %    % Basophils 0.7 %    % Immature Granulocytes 0.8 %    Nucleated RBCs 0 0 /100    Absolute Neutrophil 4.0 1.6 - 8.3 10e9/L    Absolute Lymphocytes 2.2 0.8 - 5.3 10e9/L    Absolute Monocytes 0.6 0.0 - 1.3 10e9/L    Absolute Eosinophils 0.2 0.0 - 0.7 10e9/L    Absolute  Basophils 0.1 0.0 - 0.2 10e9/L    Abs Immature Granulocytes 0.1 0 - 0.4 10e9/L    Absolute Nucleated RBC 0.0      Medications   ondansetron (ZOFRAN) injection 4 mg (4 mg Intravenous Given 7/30/20 1303)   famotidine (PEPCID) infusion 20 mg (0 mg Intravenous Stopped 7/30/20 1351)            No results found. However, due to the size of the patient record, not all encounters were searched. Please check Results Review for a complete set of results.  Medications - No data to display          Assessments & Plan (with Medical Decision Making)   Is a very nice 52-year-old male presented to the ER complaining of diffuse abdominal pain and 1 day of constipation.  Patient was having diarrhea yesterday and today turned towards a crampy type constipation.  Patient here has no reproducible tenderness on exam.  Patient's vital signs are all stable.  We obtain labs are also negative.  Patient's been seen in the ER since many times for similar symptoms.  Most notably patient had a CT abdomen and pelvis done June 25, 2020 that was negative.  Patient also had abdominal x-rays of similar today's earlier this month.  Patient's x-ray today shows constipation otherwise no signs of obstruction.  I do not suspect any acute abdominal pathology at this time.  He has no repeat tenderness on exam and is well-appearing.  Patient is also had no ongoing nausea or vomiting in the ER.  Patient will be discharged home instructed to follow-up with PCP for further care.  Patient stable for discharge    I have reviewed the nursing notes.    I have reviewed the findings, diagnosis, plan and need for follow up with the patient.    New Prescriptions    No medications on file       Final diagnoses:   Constipation, unspecified constipation type     I, Angelo Anderson, am serving as a trained medical scribe to document services personally performed by Analilia Galeas MD, based on the provider's statements to me.   I, Analilia Galeas MD, was physically present and have  reviewed and verified the accuracy of this note documented by Angelo Anderson.    7/30/2020   [facility] [place], EMERGENCY DEPARTMENT     Analilia Galeas MD  07/30/20 [id number]

## 2020-07-30 NOTE — ED TRIAGE NOTES
Pt presents ambulatory through triage with x1 day mid abd cramping pain. Pt states has been treated for this pain frequently. Last BM yesterday morning, diarrhea. Pt also c/o nausea, no vomiting.

## 2020-07-30 NOTE — DISCHARGE INSTRUCTIONS
Your blood work is normal.     Your abdominal xray is normal.     Take constipation medication as needed.     Follow up with your primary care doctor as needed for further care.     Return to the ER if any other problems/concerns.

## 2020-07-31 RX ORDER — SENNA AND DOCUSATE SODIUM 50; 8.6 MG/1; MG/1
1 TABLET, FILM COATED ORAL 2 TIMES DAILY
Qty: 60 TABLET | Refills: 5 | Status: ON HOLD | OUTPATIENT
Start: 2020-07-31 | End: 2021-12-26

## 2020-07-31 NOTE — TELEPHONE ENCOUNTER
SENNA-docusate sodium (SENNA S) 8.6-50 MG tablet       Last Written Prescription Date:  Not active on med list     Medication Hx: previous fills hospital discharge ( end date 5-29-20), other providers- not PCP    Last Office Visit : 2-13-20  Future Office visit:  none    Routing refill request to provider for review/approval because:  Drug not active on patient's medication list

## 2020-08-05 ENCOUNTER — TELEPHONE (OUTPATIENT)
Dept: INFECTIOUS DISEASES | Facility: CLINIC | Age: 55
End: 2020-08-05

## 2020-08-06 DIAGNOSIS — Z11.3 ROUTINE SCREENING FOR STI (SEXUALLY TRANSMITTED INFECTION): ICD-10-CM

## 2020-08-06 DIAGNOSIS — B20 HUMAN IMMUNODEFICIENCY VIRUS (HIV) DISEASE (H): ICD-10-CM

## 2020-08-06 LAB
CD3 CELLS # BLD: 1143 CELLS/UL (ref 603–2990)
CD3 CELLS NFR BLD: 56 % (ref 49–84)
CD3+CD4+ CELLS # BLD: 362 CELLS/UL (ref 441–2156)
CD3+CD4+ CELLS NFR BLD: 18 % (ref 28–63)
CD3+CD4+ CELLS/CD3+CD8+ CLL BLD: 0.5 % (ref 1.4–2.6)
CD3+CD8+ CELLS # BLD: 730 CELLS/UL (ref 125–1312)
CD3+CD8+ CELLS NFR BLD: 36 % (ref 10–40)
CHOLEST SERPL-MCNC: 184 MG/DL
HDLC SERPL-MCNC: 39 MG/DL
IFC SPECIMEN: ABNORMAL
LDLC SERPL CALC-MCNC: 81 MG/DL
NONHDLC SERPL-MCNC: 144 MG/DL
T PALLIDUM AB SER QL: NONREACTIVE
TRIGL SERPL-MCNC: 316 MG/DL

## 2020-08-07 ENCOUNTER — VIRTUAL VISIT (OUTPATIENT)
Dept: INFECTIOUS DISEASES | Facility: CLINIC | Age: 55
End: 2020-08-07
Attending: INTERNAL MEDICINE
Payer: COMMERCIAL

## 2020-08-07 ENCOUNTER — TELEPHONE (OUTPATIENT)
Dept: SURGERY | Facility: CLINIC | Age: 55
End: 2020-08-07

## 2020-08-07 ENCOUNTER — PREP FOR PROCEDURE (OUTPATIENT)
Dept: SURGERY | Facility: CLINIC | Age: 55
End: 2020-08-07

## 2020-08-07 DIAGNOSIS — Z21 HIV INFECTION, UNSPECIFIED SYMPTOM STATUS (H): ICD-10-CM

## 2020-08-07 DIAGNOSIS — K62.82 ANAL DYSPLASIA: Primary | ICD-10-CM

## 2020-08-07 DIAGNOSIS — B20 HUMAN IMMUNODEFICIENCY VIRUS (HIV) DISEASE (H): Primary | ICD-10-CM

## 2020-08-07 LAB
C TRACH DNA SPEC QL NAA+PROBE: NEGATIVE
HIV1 RNA # PLAS NAA DL=20: NORMAL {COPIES}/ML
HIV1 RNA SERPL NAA+PROBE-LOG#: NORMAL {LOG_COPIES}/ML
N GONORRHOEA DNA SPEC QL NAA+PROBE: NEGATIVE
SPECIMEN SOURCE: NORMAL
SPECIMEN SOURCE: NORMAL

## 2020-08-07 NOTE — LETTER
"8/7/2020       RE: Andrew Lockwood  [address] [facility] [address]     Dear Colleague,    Thank you for referring your patient, Andrew Lockwood, to the [facility] AND INFECTIOUS DISEASES at [facility]. Please see a copy of my visit note below.    Patient was called twice and left messages to call back. Patient called back and was rude and hung up before completing the check in process. So Ko on 8/7/2020 at 2:07 PM      Andrew Lockwood is a 52 year old male who is being evaluated via a billable telephone visit.      The patient has been notified of following:     \"This telephone visit will be conducted via a call between you and your physician/provider. We have found that certain health care needs can be provided without the need for a physical exam.  This service lets us provide the care you need with a short phone conversation.  If a prescription is necessary we can send it directly to your pharmacy.  If lab work is needed we can place an order for that and you can then stop by our lab to have the test done at a later time.    Telephone visits are billed at different rates depending on your insurance coverage. During this emergency period, for some insurers they may be billed the same as an in-person visit.  Please reach out to your insurance provider with any questions.    If during the course of the call the physician/provider feels a telephone visit is not appropriate, you will not be charged for this service.\"    Patient has given verbal consent for Telephone visit?  Yes    What phone number would you like to be contacted at? [phone]    How would you like to obtain your AVS? [place]    Phone call duration: 9 minutes    Reason for visit:   Infectious Disease Return Visit, planned follow-up for HIV, follow-up after lab testing    SUBJECTIVE:    Andrew Lockwood is a 51 yo man well known to me, as I follow him for his HIV infection.   Andrew had labs drawn " "ordered by me this past 8/6/2020.    Andrew is doing so well!  He has stable housing at a sober house called the \"[facility]  He can be there for 2 years.  Andrew initially found the rules to be challenging, but now he is feeling very appreciative for the stable housing.   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    Labs from 8/6 were reviewed with the patient, and he was congratulated for an undetectable HIV viral load.  He reports taking his antiretroviral regimen Biktarvy every day as prescribed.  He has not noted any adverse effects.    I have reviewed and updated the patient's Past Medical History, Social History, Family History and Medication List.    OBJECTIVE:    Current Outpatient Medications   Medication     bictegravir-emtricitabine-tenofovir (BIKTARVY) -25 MG per tablet     blood glucose (NO BRAND SPECIFIED) lancets standard     blood glucose (NO BRAND SPECIFIED) test strip     blood glucose monitoring (NO BRAND SPECIFIED) meter device kit     docusate sodium (COLACE) 50 MG capsule     famotidine (PEPCID) 20 MG tablet     insulin glargine (BASAGLAR KWIKPEN) 100 UNIT/ML pen     insulin pen needle (B-D U/F) 31G X 8 MM miscellaneous     insulin pen needle (BD PEN NEEDLE SCOTT 2ND GEN) 32G X 4 MM miscellaneous     NOVOLOG FLEXPEN 100 UNIT/ML soln     Nutritional Supplements (GLUCOSE MANAGEMENT) TABS     polyethylene glycol (MIRALAX) powder     psyllium (METAMUCIL SMOOTH TEXTURE) 28 % packet     SENNA-docusate sodium (SENNA S) 8.6-50 MG tablet     sennosides (SENOKOT) 8.6 MG tablet     STATIN NOT PRESCRIBED (INTENTIONAL)     No current facility-administered medications for this visit.        Allergies   Allergen Reactions     Metformin      Abdominal pain     Milk [Lac Bovis] Hives     Dulaglutide Rash       Examination via telephone visit:     GENERAL: Patient is alert and does not seem to be in any " "acute distress    RESPIRATORY: No cough or labored breathing is noted.  PSYCH: Affect is bright. Speech fluent and appropriate.      The rest of a comprehensive physical examination is deferred due to the public health emergency telephone visit restrictions.      New labs:  8/6/2020  HIV viral load not detected  CD4 362 absolute and 18%  Treponemal antibody not reactive  GC/Chlamydia urine testing only negative    7/30/2020  CMP all within normal limits including a glucose of 94  CBC with Diff all OK    ASSESSMENT:    HIV infection, well controlled    Patient was once again congratulated on his undetectable HIV viral load, and daily adherence to his antiretroviral medication.  He will continue on Biktarvy, and this was refilled.   We can check routine HIV labs again in 3-6 months.    High risk sexual behavior    We just did the urine GC/Chlamydia testing (negative) so patient was reminded of the option for throat and rectal swabbing, and he declined for now.  Treponema antibody also not detected on 8/6.    Andrew has a habit of saying to me \"I don't Syphilis!  I don't have Chlamydia!\"  Nevertheless, he did have a partner before he went to  treatment that was an injection drug user and had Hepatitis C infection.   We tested Hepatitis C since their last sexual encounter (per Andrew's verbal report to me) on 2/11/2020.  We will repeat annually unless Andrew tells me of a repeat encounter, or other exposure.    LGSIL on anal pap 6/22/2018  Colorectal follow-up since this finding has not been successful.  He has had multiple failed appointments.  I stressed to him the importance of following up with this.  He can be difficult to get a hold of, and was always losing his phone.  Now this should be better with stable housing.  He is also required to have a  and someone to monitor him for 24 hours, so this has been difficult to ensure as well.  His next plan is for 9/9/2020.   I did urge Andrew to do his very best to " make this work.    Andrew has an endocrinologist to monitor his Diabetes (which is also better with stable housing).  He has also been following up with primary care more often.    Ayala Prieto MD, MS  Infectious Disease    Time:  I spent 9 total minutes on the telephone with the patient, including >50% of time in counseling.      Again, thank you for allowing me to participate in the care of your patient.      Sincerely,    Ayala Prieto MD

## 2020-08-07 NOTE — PROGRESS NOTES
Patient was called twice and left messages to call back. Patient called back and was rude and hung up before completing the check in process. So Ko on 8/7/2020 at 2:07 PM

## 2020-08-07 NOTE — TELEPHONE ENCOUNTER
"Per patient's DAYNA Simon GALVANManuel, he can be patient's  and monitor patient for 24 hours after surgery. Patient said the same, and both say that patient's cell phone now works.    Phone call to patient's cell # on file, automated greeting reached that \"The number you have dialed is unallocated.\" Unable to leave voicemail message.    Phone call to patient's home number on-file, \"the text now subcriber you have reached is unavailable right now, please leave a message\" - left  msg to return call regarding scheduling.    Phone call to patient's [facility] at phone number on-file, vm greeting says it is an RN at [facility] [place], operated by [facility], left message to please have patient call me back as soon as possible at the [facility] regarding a scheduling matter (I did not indicate anything medical). I did indicate that patient would know who I am and why I am calling.    I also sent patient an email message to please call me to schedule.       "

## 2020-08-07 NOTE — PROGRESS NOTES
"Andrew Lockwood is a 52 year old male who is being evaluated via a billable telephone visit.      The patient has been notified of following:     \"This telephone visit will be conducted via a call between you and your physician/provider. We have found that certain health care needs can be provided without the need for a physical exam.  This service lets us provide the care you need with a short phone conversation.  If a prescription is necessary we can send it directly to your pharmacy.  If lab work is needed we can place an order for that and you can then stop by our lab to have the test done at a later time.    Telephone visits are billed at different rates depending on your insurance coverage. During this emergency period, for some insurers they may be billed the same as an in-person visit.  Please reach out to your insurance provider with any questions.    If during the course of the call the physician/provider feels a telephone visit is not appropriate, you will not be charged for this service.\"    Patient has given verbal consent for Telephone visit?  Yes    What phone number would you like to be contacted at? [phone]    How would you like to obtain your AVS? Mail a copy    Phone call duration: *** minutes    {signature options:[id number]}      Patient was called and message was left. So Ko on 8/7/2020 at 1:46 PM    Patient was called and message left message to call into clinic. So Ko on 8/7/2020 at 1:39 PM    "

## 2020-08-07 NOTE — PROGRESS NOTES
"  Andrew Lockwood is a 52 year old male who is being evaluated via a billable telephone visit.      The patient has been notified of following:     \"This telephone visit will be conducted via a call between you and your physician/provider. We have found that certain health care needs can be provided without the need for a physical exam.  This service lets us provide the care you need with a short phone conversation.  If a prescription is necessary we can send it directly to your pharmacy.  If lab work is needed we can place an order for that and you can then stop by our lab to have the test done at a later time.    Telephone visits are billed at different rates depending on your insurance coverage. During this emergency period, for some insurers they may be billed the same as an in-person visit.  Please reach out to your insurance provider with any questions.    If during the course of the call the physician/provider feels a telephone visit is not appropriate, you will not be charged for this service.\"    Patient has given verbal consent for Telephone visit?  Yes    What phone number would you like to be contacted at? [phone]    How would you like to obtain your AVS? [employer]    Phone call duration: 9 minutes    Reason for visit:   Infectious Disease Return Visit, planned follow-up for HIV, follow-up after lab testing    SUBJECTIVE:    Andrew Lockwood is a 53 yo man well known to me, as I follow him for his HIV infection.   Andrew had labs drawn ordered by me this past 8/6/2020.    Andrew is doing so well!  He has stable housing at a sober house called the \"[facility]  He can be there for 2 years.  Andrew initially found the rules to be challenging, but now he is feeling very appreciative for the stable housing.   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    Labs from 8/6 were reviewed with the patient, and he was " congratulated for an undetectable HIV viral load.  He reports taking his antiretroviral regimen Biktarvy every day as prescribed.  He has not noted any adverse effects.    I have reviewed and updated the patient's Past Medical History, Social History, Family History and Medication List.    OBJECTIVE:    Current Outpatient Medications   Medication     bictegravir-emtricitabine-tenofovir (BIKTARVY) -25 MG per tablet     blood glucose (NO BRAND SPECIFIED) lancets standard     blood glucose (NO BRAND SPECIFIED) test strip     blood glucose monitoring (NO BRAND SPECIFIED) meter device kit     docusate sodium (COLACE) 50 MG capsule     famotidine (PEPCID) 20 MG tablet     insulin glargine (BASAGLAR KWIKPEN) 100 UNIT/ML pen     insulin pen needle (B-D U/F) 31G X 8 MM miscellaneous     insulin pen needle (BD PEN NEEDLE SCOTT 2ND GEN) 32G X 4 MM miscellaneous     NOVOLOG FLEXPEN 100 UNIT/ML soln     Nutritional Supplements (GLUCOSE MANAGEMENT) TABS     polyethylene glycol (MIRALAX) powder     psyllium (METAMUCIL SMOOTH TEXTURE) 28 % packet     SENNA-docusate sodium (SENNA S) 8.6-50 MG tablet     sennosides (SENOKOT) 8.6 MG tablet     STATIN NOT PRESCRIBED (INTENTIONAL)     No current facility-administered medications for this visit.        Allergies   Allergen Reactions     Metformin      Abdominal pain     Milk [Lac Bovis] Hives     Dulaglutide Rash       Examination via telephone visit:     GENERAL: Patient is alert and does not seem to be in any acute distress    RESPIRATORY: No cough or labored breathing is noted.  PSYCH: Affect is bright. Speech fluent and appropriate.      The rest of a comprehensive physical examination is deferred due to the public health emergency telephone visit restrictions.      New labs:  8/6/2020  HIV viral load not detected  CD4 362 absolute and 18%  Treponemal antibody not reactive  GC/Chlamydia urine testing only negative    7/30/2020  CMP all within normal limits including a glucose of  "94  CBC with Diff all OK    ASSESSMENT:    HIV infection, well controlled    Patient was once again congratulated on his undetectable HIV viral load, and daily adherence to his antiretroviral medication.  He will continue on Biktarvy, and this was refilled.   We can check routine HIV labs again in 3-6 months.    High risk sexual behavior    We just did the urine GC/Chlamydia testing (negative) so patient was reminded of the option for throat and rectal swabbing, and he declined for now.  Treponema antibody also not detected on 8/6.    Andrew has a habit of saying to me \"I don't Syphilis!  I don't have Chlamydia!\"  Nevertheless, he did have a partner before he went to  treatment that was an injection drug user and had Hepatitis C infection.   We tested Hepatitis C since their last sexual encounter (per Andrew's verbal report to me) on 2/11/2020.  We will repeat annually unless Andrew tells me of a repeat encounter, or other exposure.    LGSIL on anal pap 6/22/2018  Colorectal follow-up since this finding has not been successful.  He has had multiple failed appointments.  I stressed to him the importance of following up with this.  He can be difficult to get a hold of, and was always losing his phone.  Now this should be better with stable housing.  He is also required to have a  and someone to monitor him for 24 hours, so this has been difficult to ensure as well.  His next plan is for 9/9/2020.   I did urge Andrew to do his very best to make this work.    Andrew has an endocrinologist to monitor his Diabetes (which is also better with stable housing).  He has also been following up with primary care more often.    Ayala Prieto MD, MS  Infectious Disease    Time:  I spent 9 total minutes on the telephone with the patient, including >50% of time in counseling.    "

## 2020-08-10 DIAGNOSIS — K62.82 ANAL DYSPLASIA: Primary | ICD-10-CM

## 2020-08-10 NOTE — TELEPHONE ENCOUNTER
Patient's Juan Montes got back to me via in basket message stating that he's unable to assist on Mondays and Fridays, so the surgery date Andrew and I had picked out will not work.    Dr Lundberg can do 9/9/2020 if at [place] (patient may need to be seen at [place] anyway), so I called patient to see if that date might work, left vm msg for return call regarding this. Also left message at his group home asking that they ask patient to call me. I also sent an in basket message to Juan Montes stating that the new tentative date is Weds 9/9/2020    Awaiting call from patient before officially scheduling this case on 9/9/2020 at [place].

## 2020-08-10 NOTE — TELEPHONE ENCOUNTER
Patient called back to confirm the scheduling below. Juan Montes also confirmed the surgery date and stated he'd ensure that patient has a ride afterwards and someone to stay with him for 24 hours.    Patient is scheduled for surgery with Dr. Tamika Yanes with Andrew    Date of Surgery: 9/9/2020    Location: [facility]    Informed patient they will need an adult  YES    H&P: Scheduled with PAC on 8/27/2020 at 2:15 pm    [place] [facility] [place] at 11:20 am    Surgery packet: mailed and emailed to patient, as per patient request    Additional Info:   - scheduled at [facility] because this case must be scheduled on a Tues, Weds, or Thurs in order for DAYNA Montes to arrange a place for patient to stay after surgery for 24 hours, and a  and adult person to stay with patient during that time - this only works with Dr Lundberg's schedule at [facility]

## 2020-08-10 NOTE — TELEPHONE ENCOUNTER
FUTURE VISIT INFORMATION      SURGERY INFORMATION:    Date: 20    [facility]    Surgeon:  Thanh Lundberg MD     Anesthesia Type:  MAC    Procedure: EXAM UNDER ANESTHESIA, ANUS; HIGH RESOLUTION ANOSCOPY WITH POSSIBLE FULGURATION, POSSIBLE BIOPSY EXAM UNDER ANESTHESIA, ANUS; HIGH RESOLUTION ANOSCOPY WITH POSSIBLE FULGURATION, POSSIBLE BIOPSY (SAME AS ABOVE)    RECORDS REQUESTED FROM:       Primary Care Provider: Caitie Lamb MD- [facility]    Pertinent Medical History: pulmonary nodules    Most recent EKG+ Tracin20

## 2020-08-11 NOTE — TELEPHONE ENCOUNTER
FUTURE VISIT INFORMATION        SURGERY INFORMATION:    Date: 20    [facility]    Surgeon:  Thanh Lundberg MD     Anesthesia Type:  MAC    Procedure: EXAM UNDER ANESTHESIA, ANUS; HIGH RESOLUTION ANOSCOPY WITH POSSIBLE FULGURATION, POSSIBLE BIOPSY EXAM UNDER ANESTHESIA, ANUS; HIGH RESOLUTION ANOSCOPY WITH POSSIBLE FULGURATION, POSSIBLE BIOPSY (SAME AS ABOVE)     RECORDS REQUESTED FROM:         Primary Care Provider: Caitie Lamb MD- [facility]     Pertinent Medical History: pulmonary nodules     Most recent EKG+ Tracin20

## 2020-08-14 ENCOUNTER — HOSPITAL ENCOUNTER (EMERGENCY)
Facility: CLINIC | Age: 55
Discharge: HOME | End: 2020-08-14
Attending: EMERGENCY MEDICINE | Admitting: EMERGENCY MEDICINE
Payer: COMMERCIAL

## 2020-08-14 VITALS
OXYGEN SATURATION: 97 % | SYSTOLIC BLOOD PRESSURE: 141 MMHG | HEIGHT: 64 IN | TEMPERATURE: 98.1 F | DIASTOLIC BLOOD PRESSURE: 87 MMHG | BODY MASS INDEX: 33.51 KG/M2 | RESPIRATION RATE: 16 BRPM | WEIGHT: 196.3 LBS

## 2020-08-14 DIAGNOSIS — E16.2 HYPOGLYCEMIA: ICD-10-CM

## 2020-08-14 LAB
ALBUMIN SERPL-MCNC: 4 G/DL (ref 3.4–5)
ALP SERPL-CCNC: 102 U/L (ref 40–150)
ALT SERPL W P-5'-P-CCNC: 27 U/L (ref 0–70)
ANION GAP SERPL CALCULATED.3IONS-SCNC: 6 MMOL/L (ref 3–14)
AST SERPL W P-5'-P-CCNC: 31 U/L (ref 0–45)
BASOPHILS # BLD AUTO: 0.1 10E9/L (ref 0–0.2)
BASOPHILS NFR BLD AUTO: 0.5 %
BILIRUB SERPL-MCNC: 1.4 MG/DL (ref 0.2–1.3)
BUN SERPL-MCNC: 12 MG/DL (ref 7–30)
CALCIUM SERPL-MCNC: 9.6 MG/DL (ref 8.5–10.1)
CHLORIDE SERPL-SCNC: 104 MMOL/L (ref 94–109)
CO2 SERPL-SCNC: 27 MMOL/L (ref 20–32)
CREAT SERPL-MCNC: 0.96 MG/DL (ref 0.66–1.25)
DIFFERENTIAL METHOD BLD: NORMAL
EOSINOPHIL # BLD AUTO: 0.2 10E9/L (ref 0–0.7)
EOSINOPHIL NFR BLD AUTO: 1.5 %
ERYTHROCYTE [DISTWIDTH] IN BLOOD BY AUTOMATED COUNT: 12.4 % (ref 10–15)
GFR SERPL CREATININE-BSD FRML MDRD: 89 ML/MIN/{1.73_M2}
GLUCOSE BLDC GLUCOMTR-MCNC: 107 MG/DL (ref 70–99)
GLUCOSE BLDC GLUCOMTR-MCNC: 58 MG/DL (ref 70–99)
GLUCOSE BLDC GLUCOMTR-MCNC: 89 MG/DL (ref 70–99)
GLUCOSE SERPL-MCNC: 79 MG/DL (ref 70–99)
HCT VFR BLD AUTO: 47.2 % (ref 40–53)
HGB BLD-MCNC: 16 G/DL (ref 13.3–17.7)
IMM GRANULOCYTES # BLD: 0 10E9/L (ref 0–0.4)
IMM GRANULOCYTES NFR BLD: 0.4 %
LYMPHOCYTES # BLD AUTO: 3.1 10E9/L (ref 0.8–5.3)
LYMPHOCYTES NFR BLD AUTO: 29.8 %
MCH RBC QN AUTO: 31.6 PG (ref 26.5–33)
MCHC RBC AUTO-ENTMCNC: 33.9 G/DL (ref 31.5–36.5)
MCV RBC AUTO: 93 FL (ref 78–100)
MONOCYTES # BLD AUTO: 0.8 10E9/L (ref 0–1.3)
MONOCYTES NFR BLD AUTO: 7.3 %
NEUTROPHILS # BLD AUTO: 6.3 10E9/L (ref 1.6–8.3)
NEUTROPHILS NFR BLD AUTO: 60.5 %
NRBC # BLD AUTO: 0 10*3/UL
NRBC BLD AUTO-RTO: 0 /100
PLATELET # BLD AUTO: 370 10E9/L (ref 150–450)
POTASSIUM SERPL-SCNC: 3.3 MMOL/L (ref 3.4–5.3)
PROT SERPL-MCNC: 8.2 G/DL (ref 6.8–8.8)
RBC # BLD AUTO: 5.06 10E12/L (ref 4.4–5.9)
SODIUM SERPL-SCNC: 137 MMOL/L (ref 133–144)
TROPONIN I SERPL-MCNC: <0.015 UG/L (ref 0–0.04)
WBC # BLD AUTO: 10.3 10E9/L (ref 4–11)

## 2020-08-14 PROCEDURE — 00000146 ZZHCL STATISTIC GLUCOSE BY METER IP

## 2020-08-14 PROCEDURE — 99284 EMERGENCY DEPT VISIT MOD MDM: CPT | Performed by: EMERGENCY MEDICINE

## 2020-08-14 PROCEDURE — 84484 ASSAY OF TROPONIN QUANT: CPT | Performed by: EMERGENCY MEDICINE

## 2020-08-14 PROCEDURE — 80053 COMPREHEN METABOLIC PANEL: CPT | Performed by: EMERGENCY MEDICINE

## 2020-08-14 PROCEDURE — 93005 ELECTROCARDIOGRAM TRACING: CPT | Performed by: EMERGENCY MEDICINE

## 2020-08-14 PROCEDURE — 85025 COMPLETE CBC W/AUTO DIFF WBC: CPT | Performed by: EMERGENCY MEDICINE

## 2020-08-14 PROCEDURE — 99284 EMERGENCY DEPT VISIT MOD MDM: CPT | Mod: Z6 | Performed by: EMERGENCY MEDICINE

## 2020-08-14 ASSESSMENT — ENCOUNTER SYMPTOMS
ENDOCRINE COMMENTS: LOW BLOOD SUGAR
POLYDIPSIA: 1

## 2020-08-14 ASSESSMENT — MIFFLIN-ST. JEOR: SCORE: 1641.41

## 2020-08-14 NOTE — ED TRIAGE NOTES
Pt came in from home for lightheadedness, thirsty for last two hours. BG 58 in triage, 2 apple juices given. Hx DM2, HIV, GERD--pt says no changes to medications/diet

## 2020-08-14 NOTE — ED NOTES
Pt came in c/o dizziness. Pt diaphoretic. Pt diabetic. BG checked and found to be 58. 2 cups of apple juice given. Will recheck blood sugar.

## 2020-08-14 NOTE — ED AVS SNAPSHOT
[facility], [place], Emergency Department  [address] [place] [id number]  Phone:  [phone]                                    Andrew Lockwood   MRN: [id number]    Department:  [facility], Emergency Department   Date of Visit:  8/14/2020           After Visit Summary Signature Page    I have received my discharge instructions, and my questions have been answered. I have discussed any challenges I see with this plan with the nurse or doctor.    ..........................................................................................................................................  Patient/Patient Representative Signature      ..........................................................................................................................................  Patient Representative Print Name and Relationship to Patient    ..................................................               ................................................  Date                                   Time    ..........................................................................................................................................  Reviewed by Signature/Title    ...................................................              ..............................................  Date                                               Time          22EPIC Rev 08/18

## 2020-08-14 NOTE — ED PROVIDER NOTES
[place] EMERGENCY DEPARTMENT ([facility])  August 14, 2020 Vertical Triage A  2:07 PM   History     Chief Complaint   Patient presents with     Hypoglycemia     Dizziness     The history is provided by the patient and medical records.     Andrew Lockwood is a 52 year old male with history of type 2 diabetes and HIV who presents with low blood sugar and lightheadedness. Patient ate breakfast consisting of coffee and 2 bagels. He took his insulin afterwards and started feeling dizzy and lightheaded.  Came to the ER with concerns for hypoglycemia.  He was given apple juice in triage with improvement of his blood sugar. He feels better after the apple juice but does note some ear fullness and discomfort. He feels thirsty at this time, and wonders if he might need IV fluids due to prior hyponatremia.     PAST MEDICAL HISTORY:   Past Medical History:   Diagnosis Date     AIDS (H)      Allergic rhinitis due to other allergen     DNS     Anal dysplasia      Chronic abdominal pain      CNS toxoplasmosis (H)      Diabetes type 2, controlled (H)      GERD (gastroesophageal reflux disease)      HIV (human immunodeficiency virus infection) (H)      Periungual wart      Sleep apnea     doesn't use CPAP       PAST SURGICAL HISTORY:   Past Surgical History:   Procedure Laterality Date     C NONSPECIFIC PROCEDURE      right forearm fracture     COLONOSCOPY Left 1/22/2016    Procedure: COMBINED COLONOSCOPY, SINGLE OR MULTIPLE BIOPSY/POLYPECTOMY BY BIOPSY;  Surgeon: Clark Saini MD;  Location: [facility]     HC EXPLORE UNDESC TESTIS,INGUIN/SCROTAL       LAPAROSCOPIC APPENDECTOMY N/A 1/31/2018    Procedure: LAPAROSCOPIC APPENDECTOMY;  LAPAROSCOPIC APPENDECTOMY;  Surgeon: Dawn Holt MD;  Location: [facility] OR     LAPAROSCOPY DIAGNOSTIC (GENERAL) N/A 7/26/2016    Procedure: LAPAROSCOPY DIAGNOSTIC (GENERAL);  Surgeon: Susannah Arriaga MD;  Location: [facility] OR     LAPAROSCOPY DIAGNOSTIC (GENERAL) N/A 4/16/2018     Procedure: LAPAROSCOPY DIAGNOSTIC (GENERAL);  Diagnostic laparoscopy and lysis of adhesions;  Surgeon: Prince Dowling MD;  Location: [facility] OR     OPTICAL TRACKING SYSTEM CRANIOTOMY, EXCISE TUMOR, COMBINED Left 4/10/2015    Procedure: COMBINED OPTICAL TRACKING SYSTEM CRANIOTOMY, EXCISE TUMOR;  Surgeon: Mirlande Colmenares MD;  Location: [facility] OR     REPAIR GAMEKEEPER'S THUMB Right 12/2/2016    Procedure: REPAIR LIGAMENT ULNAR COLLATERAL THUMB (GAMEKEEPER'S);  Surgeon: Evin Zamorano MD;  [facility]       Past medical history, past surgical history, medications, and allergies were reviewed with the patient. Additional pertinent items: None    FAMILY HISTORY:   Family History   Problem Relation Age of Onset     Diabetes Brother      Diabetes Father      Alzheimer Disease Father      Unknown/Adopted Mother      Diabetes Paternal Grandfather      Cancer No family hx of         no skin cancer     Skin Cancer No family hx of         no famiy hx of skin cancer     Glaucoma No family hx of      Macular Degeneration No family hx of        SOCIAL HISTORY:   Social History     Tobacco Use     Smoking status: Current Some Day Smoker     Packs/day: 0.50     Years: 40.00     Pack years: 20.00     Types: Cigarettes     Smokeless tobacco: Former User   Substance Use Topics     Alcohol use: No     Alcohol/week: 0.0 standard drinks     Comment: Last etoh in 2007     Social history was reviewed with the patient. Additional pertinent items: None          Discharge Medication List as of 8/14/2020  2:15 PM      CONTINUE these medications which have NOT CHANGED    Details   insulin glargine (BASAGLAR KWIKPEN) 100 UNIT/ML pen INJECT 30 UNITS SUBCUTANEOUSLY TWICE DAILY *DISCARD PEN 28 DAYS AFTER FIRST USE, DO NOT REFRIGERATE AFTER FIRST USE*, Disp-15 mL,R-15, E-PrescribeURGENT! MUST DISPENSE FULL BOX OF 15ML PER FILL- CURRENTLY THIS IS ONLY A 23 DAY SUPPLY!      insulin lispro (HUMALOG KWIKPEN) 100 UNIT/ML (1 unit dial)  KWIKPEN INJECT 1-7 UNITS SUBCUTANEOUSLY THREE TIMES DAILY BEFORE MEALS PLUS SLIDING SCALE (MAX DAILY DOSE IS 72 UNITS), Disp-15 mL,R-17, E-PrescribeRX MUST BE DISPENSED BY BOX OF 15ML- CURRENT DOSE WOULD BE A 20 DAY SUPPLY IF PATIENT USING MAX DOSE.      bictegravir-emtricitabine-tenofovir (BIKTARVY) -25 MG per tablet Take 1 tablet by mouth daily, Disp-30 tablet,R-11, E-Prescribe      blood glucose (NO BRAND SPECIFIED) lancets standard Use to test blood sugar four times daily or as directed.Disp-100 each,[id number]      blood glucose (NO BRAND SPECIFIED) test strip 1 strip by In Vitro route 4 times daily (before meals and nightly) Use to test blood sugars 4 times daily or as directed, Disp-100 strip,R-11, E-Prescribe      blood glucose monitoring (NO BRAND SPECIFIED) meter device kit Use to test blood sugar 4 times daily or as directed.Disp-1 kit,[id number] provide meter that is covered by insurance.      Ciclopirox 8 % KIT Externally apply 1 Application topically daily In evening, remove from nails once weekly with alcohol, Disp-34 mL,R-1, E-Prescribe      docusate sodium (COLACE) 50 MG capsule Take 2 capsules (100 mg) by mouth 2 times daily as needed for constipation, Disp-60 capsule,R-5, E-Prescribe      famotidine (PEPCID) 20 MG tablet Take 1 tablet (20 mg) by mouth 2 times daily, Disp-60 tablet,R-11, E-Prescribe      ibuprofen (ADVIL/MOTRIN) 600 MG tablet Take 1 tablet (600 mg) by mouth every 6 hours as needed for moderate pain, Disp-30 tablet,R-0, Local Print      !! insulin pen needle (B-D U/F) 31G X 8 MM miscellaneous Use 1 pen needles daily or as directed.Disp-100 each,[id number]      !! insulin pen needle (BD PEN NEEDLE SCOTT 2ND GEN) 32G X 4 MM miscellaneous Use 3-4 daily.Disp-250 each,[id number]      Nutritional Supplements (GLUCOSE MANAGEMENT) TABS Use 1-2 tabs for hypoglycemia., Disp-30 tablet,R-3, E-Prescribe      ondansetron (ZOFRAN-ODT) 4 MG ODT tab Take 1 tablet (4 mg) by  "mouth every 8 hours as needed for nausea, Disp-24 tablet,R-0, Local Print      polyethylene glycol (MIRALAX) powder Take 17 g (1 capful) by mouth daily as needed for constipation, Disp-289 g,R-0, E-Prescribe      psyllium (METAMUCIL SMOOTH TEXTURE) 28 % packet Use one packet in 8oz water daily., Disp-30 each,R-0, E-Prescribe      SENNA-docusate sodium (SENNA S) 8.6-50 MG tablet Take 1 tablet by mouth 2 times daily Hold for loose stools., Disp-60 tablet,R-5, E-Prescribe      sennosides (SENOKOT) 8.6 MG tablet Take 1 tablet by mouth 2 times daily as needed for constipation, Disp-60 tablet,R-5, E-Prescribe      STATIN NOT PRESCRIBED (INTENTIONAL) Reason Statin was Not Prescribed: OTHER - Enter reason is comments section (This option does NOTexclude patient from measure) / noted in chart that is recommended multiple times       !! - Potential duplicate medications found. Please discuss with provider.      STOP taking these medications       magnesium citrate solution Comments:   Reason for Stopping:                  Allergies   Allergen Reactions     Metformin      Abdominal pain     Milk [Lac Bovis] Hives     Dulaglutide Rash        Review of Systems   HENT: Positive for ear pain (Sensation of ear fullness).    Endocrine: Positive for polydipsia.        Low blood sugar     A complete review of systems was performed with pertinent positives and negatives noted in the HPI, and all other systems negative.    Physical Exam   BP: (!) 141/87  Heart Rate: 96  Temp: 98.1  F (36.7  C)  Resp: 16  Height: 162.6 cm (5' 4\")  Weight: 89 kg (196 lb 4.8 oz)  SpO2: 97 %      Physical Exam  Vitals signs and nursing note reviewed.   Constitutional:       General: He is not in acute distress.     Appearance: He is well-developed.   HENT:      Head: Normocephalic.      Comments: There is some fluid behind the left eardrum, no evidence of active otitis media     Left Ear: A middle ear effusion is present.   Eyes:      Extraocular Movements: " Extraocular movements intact.   Neck:      Musculoskeletal: Neck supple.   Pulmonary:      Effort: No respiratory distress.   Musculoskeletal:         General: No deformity.   Skin:     General: Skin is dry.   Neurological:      Mental Status: He is alert.      Comments: Oriented   Psychiatric:         Behavior: Behavior normal.         ED Course        Procedures                           Results for orders placed or performed during the hospital encounter of 08/14/20 (from the past 24 hour(s))   Glucose by meter   Result Value Ref Range    Glucose 58 (L) 70 - 99 mg/dL   EKG 12 lead   Result Value Ref Range    Interpretation ECG Click View Image link to view waveform and result    Glucose by meter   Result Value Ref Range    Glucose 89 70 - 99 mg/dL   CBC with platelets differential   Result Value Ref Range    WBC 10.3 4.0 - 11.0 10e9/L    RBC Count 5.06 4.4 - 5.9 10e12/L    Hemoglobin 16.0 13.3 - 17.7 g/dL    Hematocrit 47.2 40.0 - 53.0 %    MCV 93 78 - 100 fl    MCH 31.6 26.5 - 33.0 pg    MCHC 33.9 31.5 - 36.5 g/dL    RDW 12.4 10.0 - 15.0 %    Platelet Count 370 150 - 450 10e9/L    Diff Method Automated Method     % Neutrophils 60.5 %    % Lymphocytes 29.8 %    % Monocytes 7.3 %    % Eosinophils 1.5 %    % Basophils 0.5 %    % Immature Granulocytes 0.4 %    Nucleated RBCs 0 0 /100    Absolute Neutrophil 6.3 1.6 - 8.3 10e9/L    Absolute Lymphocytes 3.1 0.8 - 5.3 10e9/L    Absolute Monocytes 0.8 0.0 - 1.3 10e9/L    Absolute Eosinophils 0.2 0.0 - 0.7 10e9/L    Absolute Basophils 0.1 0.0 - 0.2 10e9/L    Abs Immature Granulocytes 0.0 0 - 0.4 10e9/L    Absolute Nucleated RBC 0.0    Comprehensive metabolic panel   Result Value Ref Range    Sodium 137 133 - 144 mmol/L    Potassium 3.3 (L) 3.4 - 5.3 mmol/L    Chloride 104 94 - 109 mmol/L    Carbon Dioxide 27 20 - 32 mmol/L    Anion Gap 6 3 - 14 mmol/L    Glucose 79 70 - 99 mg/dL    Urea Nitrogen 12 7 - 30 mg/dL    Creatinine 0.96 0.66 - 1.25 mg/dL    GFR Estimate 89 >60  mL/min/[1.73_m2]    GFR Estimate If Black >90 >60 mL/min/[1.73_m2]    Calcium 9.6 8.5 - 10.1 mg/dL    Bilirubin Total 1.4 (H) 0.2 - 1.3 mg/dL    Albumin 4.0 3.4 - 5.0 g/dL    Protein Total 8.2 6.8 - 8.8 g/dL    Alkaline Phosphatase 102 40 - 150 U/L    ALT 27 0 - 70 U/L    AST 31 0 - 45 U/L   Troponin I   Result Value Ref Range    Troponin I ES <0.015 0.000 - 0.045 ug/L   Glucose by meter   Result Value Ref Range    Glucose 107 (H) 70 - 99 mg/dL     *Note: Due to a large number of results and/or encounters for the requested time period, some results have not been displayed. A complete set of results can be found in Results Review.     Medications - No data to display          Assessments & Plan (with Medical Decision Making)   52-year-old male presents to us with a chief complaint of dizziness.  He was found to be hypoglycemic.  Patient did admit to using his insulin without eating a full meal.  He was given food here which improved his symptoms.  This is likely the source of his hyperglycemia.  He is feeling much better at this point.  Labs are otherwise unremarkable.  We will discharge him home precautions to make sure he eats after he uses his insulin.    I have reviewed the nursing notes.    I have reviewed the findings, diagnosis, plan and need for follow up with the patient.    Discharge Medication List as of 8/14/2020  2:15 PM          Final diagnoses:   Hypoglycemia   I, Yadira Boss, am serving as a trained medical scribe to document services personally performed by Shaw Stewart DO based on the provider's statements to me on August 14, 2020.  This document has been checked and approved by the attending provider.    I, Shaw Stewart DO, was physically present and have reviewed and verified the accuracy of this note documented by Yadira Boss medical scribe.       8/14/2020   [facility], EMERGENCY DEPARTMENT     Shaw Stewart DO  08/14/20 1434

## 2020-08-15 LAB — INTERPRETATION ECG - MUSE: NORMAL

## 2020-08-17 ENCOUNTER — PRE VISIT (OUTPATIENT)
Dept: SURGERY | Facility: CLINIC | Age: 55
End: 2020-08-17

## 2020-08-17 DIAGNOSIS — E11.65 TYPE 2 DIABETES MELLITUS WITH HYPERGLYCEMIA, WITHOUT LONG-TERM CURRENT USE OF INSULIN (H): ICD-10-CM

## 2020-08-18 ENCOUNTER — NURSE TRIAGE (OUTPATIENT)
Dept: NURSING | Facility: CLINIC | Age: 55
End: 2020-08-18

## 2020-08-18 NOTE — TELEPHONE ENCOUNTER
" at [facility] is assisting the patient in calling in as their protocol is to notify if BS is over 350 and it was 415 at 1830 and he took 21 units of Humalog at 1841.    They report that it has been high off and on since seen for hypoglycemia on 8/14 in the ER.      Patient reports that this morning, it was 169 and then he slept most of the day and missed lunch and lunch time insulin.  Yesterday morning, it was 569.    Geovanna, the RA, reports that they have trouble remembering to get all the doses in each day and usually misses his lunch insulin.    Geovanna,  at [facility].  [phone], ext. [phone].    Paged Dr. Ortega at 7:12 pm  He instructed to have them monitor and do the Basaglar dose tonight and call to get endocrinology follow-up appointment in next few days in the morning.    Called  back, as patient was not answering phone.  Reported instructions to her and she will pass on information to assist with follow-up.  Also voiced frustrations that it is difficult at times to get patient to \"take\" his insulin, so discussed strategies for getting patient to take insulin through different statements that are not as direct and authoritative.    No further questions.    Shabnam Mora RN on 8/18/2020 at 7:35 PM    ,     Reason for Disposition    Blood glucose > 400 mg/dL (22.2 mmol/L)    Additional Information    Negative: Unconscious or difficult to awaken    Negative: Acting confused (e.g., disoriented, slurred speech)    Negative: Very weak (e.g., can't stand)    Negative: Sounds like a life-threatening emergency to the triager    Negative: [1] Vomiting AND [2] signs of dehydration (e.g., very dry mouth, lightheaded, dark urine)    Negative: [1] Blood glucose > 240 mg/dL (13.3 mmol/L) AND [2] rapid breathing    Negative: Blood glucose > 500 mg/dL (27.8 mmol/L)    Negative: [1] Blood glucose > 240 mg/dL (13.3 mmol/L) AND [2] urine ketones moderate-large (or more " than 1+)    Negative: [1] Blood glucose > 240 mg/dL (13.3 mmol/L) AND [2] blood ketones > 1.4 mmol/L    Negative: [1] Blood glucose > 240 mg/dL (13.3 mmol/L) AND [2] vomiting AND [3] unable to check for ketones (in blood or urine)    Negative: [1] New onset Diabetes suspected (e.g., frequent urination, weak, weight loss) AND [2] vomiting or rapid breathing    Negative: Vomiting lasts > 4 hours    Negative: Patient sounds very sick or weak to the triager    Negative: Fever > 100.5 F (38.1 C)    Protocols used: DIABETES - HIGH BLOOD SUGAR-A-AH

## 2020-08-20 ENCOUNTER — TELEPHONE (OUTPATIENT)
Dept: SURGERY | Facility: CLINIC | Age: 55
End: 2020-08-20

## 2020-08-20 ENCOUNTER — TELEPHONE (OUTPATIENT)
Dept: ENDOCRINOLOGY | Facility: CLINIC | Age: 55
End: 2020-08-20

## 2020-08-20 RX ORDER — PEN NEEDLE, DIABETIC 32GX 5/32"
NEEDLE, DISPOSABLE MISCELLANEOUS
Qty: 100 EACH | Refills: 0 | OUTPATIENT
Start: 2020-08-20

## 2020-08-20 NOTE — TELEPHONE ENCOUNTER
Received MAGGY via email from Cielo. Explained procedure to Cielo who is an RN who works with Andrew in his group home. She also stated that pt has cognitive issues and has troubles with controlling his diabetes. She is working with his endocrinologist to try and get his blood sugars under control. Informed her that PAC will also go over this at his visit.

## 2020-08-20 NOTE — TELEPHONE ENCOUNTER
Action Needed --  I've placed a hold that needs scheduling. Please see below.  Department: Diabetes   Provider:Haley Shrestha  RTN  Date/Time:Monday 8/24/20 12:30 PM Telephone visit to [phone]   RFV:Diabetes  Previous [facility] patient   Facility will fax MOnday AM MAR and Kendra Hampton RN on 8/20/2020 at 4:05 PM

## 2020-08-20 NOTE — TELEPHONE ENCOUNTER
[facility] Call Center    Phone Message    May a detailed message be left on voicemail: yes     Reason for Call: Other: Cielo at [facility] called to get an understanding of what is done during the Anoscopy prcoedure, so she can inform patient and get him ready for the procedure. Cielo is faxing over a MAGGY to [phone] today.  Please follow up with Cielo at [phone].  (NOTE:  She is in the office today, 8/20/20, until 5pm.  Then she won't be back in office until Tuesday, 8/25/20.)   Thank you!     Action Taken: Message routed to:  [facility] ([facility]    Travel Screening: Not Applicable

## 2020-08-21 NOTE — PROGRESS NOTES
"Andrew Lockwood is a 52 year old male who is being evaluated via a billable telephone visit.      The patient has been notified of following:     \"This telephone visit will be conducted via a call between you and your physician/provider. We have found that certain health care needs can be provided without the need for a physical exam.  This service lets us provide the care you need with a short phone conversation.  If a prescription is necessary we can send it directly to your pharmacy.  If lab work is needed we can place an order for that and you can then stop by our lab to have the test done at a later time.    Telephone visits are billed at different rates depending on your insurance coverage. During this emergency period, for some insurers they may be billed the same as an in-person visit.  Please reach out to your insurance provider with any questions.    If during the course of the call the physician/provider feels a telephone visit is not appropriate, you will not be charged for this service.\"    Patient has given verbal consent for Telephone visit?  Yes    What phone number would you like to be contacted at? [phone]    How would you like to obtain your AVS? Mail a copy    Radha Tovar MA    Patients Glucose Data was Sent via Email        "

## 2020-08-23 ENCOUNTER — HOSPITAL ENCOUNTER (EMERGENCY)
Facility: CLINIC | Age: 55
Discharge: HOME | End: 2020-08-23
Attending: EMERGENCY MEDICINE | Admitting: EMERGENCY MEDICINE
Payer: COMMERCIAL

## 2020-08-23 VITALS
DIASTOLIC BLOOD PRESSURE: 93 MMHG | RESPIRATION RATE: 16 BRPM | OXYGEN SATURATION: 98 % | HEART RATE: 99 BPM | SYSTOLIC BLOOD PRESSURE: 131 MMHG | TEMPERATURE: 99.2 F

## 2020-08-23 DIAGNOSIS — K21.9 GASTROESOPHAGEAL REFLUX DISEASE WITHOUT ESOPHAGITIS: ICD-10-CM

## 2020-08-23 PROCEDURE — 25000125 ZZHC RX 250: Performed by: EMERGENCY MEDICINE

## 2020-08-23 PROCEDURE — 93005 ELECTROCARDIOGRAM TRACING: CPT | Performed by: EMERGENCY MEDICINE

## 2020-08-23 PROCEDURE — 99283 EMERGENCY DEPT VISIT LOW MDM: CPT | Mod: 25 | Performed by: EMERGENCY MEDICINE

## 2020-08-23 PROCEDURE — 25000132 ZZH RX MED GY IP 250 OP 250 PS 637: Performed by: EMERGENCY MEDICINE

## 2020-08-23 PROCEDURE — 99283 EMERGENCY DEPT VISIT LOW MDM: CPT | Performed by: EMERGENCY MEDICINE

## 2020-08-23 PROCEDURE — 93010 ELECTROCARDIOGRAM REPORT: CPT | Mod: Z6 | Performed by: EMERGENCY MEDICINE

## 2020-08-23 RX ORDER — FAMOTIDINE 20 MG/1
20 TABLET, FILM COATED ORAL 2 TIMES DAILY
Qty: 30 TABLET | Refills: 11 | Status: SHIPPED | OUTPATIENT
Start: 2020-08-23 | End: 2021-08-13

## 2020-08-23 RX ADMIN — LIDOCAINE HYDROCHLORIDE 30 ML: 20 SOLUTION ORAL; TOPICAL at 04:20

## 2020-08-23 NOTE — ED PROVIDER NOTES
ED Provider Note  [facility]      History     Chief Complaint   Patient presents with     Heartburn     Medication Refill     HPI  Andrew Lockwood is a 52 year old male who has a past medical history of HIV, chronic abdominal pain, GERD presenting with reflux.  He has been present for 1 to 2 days.  He is out of his medications and due to insurance reasons he cannot get a refill for a few days.  Denies fevers, chills, nausea, vomiting, diarrhea.  Denies chest pain or shortness of breath.  No headaches or neck stiffness.  Patient otherwise feels fine.  Is eating however his heartburn is been giving him issues.  He is requesting a GI cocktail.    Past Medical History  Past Medical History:   Diagnosis Date     AIDS (H)      Allergic rhinitis due to other allergen     DNS     Anal dysplasia      Chronic abdominal pain      CNS toxoplasmosis (H)      Diabetes type 2, controlled (H)      GERD (gastroesophageal reflux disease)      HIV (human immunodeficiency virus infection) (H)      Periungual wart      Sleep apnea     doesn't use CPAP     Past Surgical History:   Procedure Laterality Date     C NONSPECIFIC PROCEDURE      right forearm fracture     COLONOSCOPY Left 1/22/2016    Procedure: COMBINED COLONOSCOPY, SINGLE OR MULTIPLE BIOPSY/POLYPECTOMY BY BIOPSY;  Surgeon: Clark Saini MD;  Location:  GI     HC EXPLORE UNDESC TESTIS,INGUIN/SCROTAL       LAPAROSCOPIC APPENDECTOMY N/A 1/31/2018    Procedure: LAPAROSCOPIC APPENDECTOMY;  LAPAROSCOPIC APPENDECTOMY;  Surgeon: Dawn Holt MD;  Location: [facility] OR     LAPAROSCOPY DIAGNOSTIC (GENERAL) N/A 7/26/2016    Procedure: LAPAROSCOPY DIAGNOSTIC (GENERAL);  Surgeon: Susannah Arriaga MD;  [facility]     LAPAROSCOPY DIAGNOSTIC (GENERAL) N/A 4/16/2018    Procedure: LAPAROSCOPY DIAGNOSTIC (GENERAL);  Diagnostic laparoscopy and lysis of adhesions;  Surgeon: Prince Dowling MD;  Location:  OR     OPTICAL TRACKING SYSTEM  CRANIOTOMY, EXCISE TUMOR, COMBINED Left 4/10/2015    Procedure: COMBINED OPTICAL TRACKING SYSTEM CRANIOTOMY, EXCISE TUMOR;  Surgeon: Mirlande Colmenares MD;  Location: [facility] OR     REPAIR GAMEKEEPER'S THUMB Right 12/2/2016    Procedure: REPAIR LIGAMENT ULNAR COLLATERAL THUMB (GAMEKEEPER'S);  Surgeon: Evin Zamorano MD;  Location: [facility] OR     bictegravir-emtricitabine-tenofovir (BIKTARVY) -25 MG per tablet  blood glucose (NO BRAND SPECIFIED) lancets standard  blood glucose (NO BRAND SPECIFIED) test strip  blood glucose monitoring (NO BRAND SPECIFIED) meter device kit  Ciclopirox 8 % KIT  docusate sodium (COLACE) 50 MG capsule  famotidine (PEPCID) 20 MG tablet  ibuprofen (ADVIL/MOTRIN) 600 MG tablet  insulin glargine (BASAGLAR KWIKPEN) 100 UNIT/ML pen  insulin lispro (HUMALOG KWIKPEN) 100 UNIT/ML (1 unit dial) KWIKPEN  insulin pen needle (B-D U/F) 31G X 8 MM miscellaneous  insulin pen needle (BD PEN NEEDLE SCOTT 2ND GEN) 32G X 4 MM miscellaneous  Nutritional Supplements (GLUCOSE MANAGEMENT) TABS  ondansetron (ZOFRAN-ODT) 4 MG ODT tab  polyethylene glycol (MIRALAX) powder  psyllium (METAMUCIL SMOOTH TEXTURE) 28 % packet  SENNA-docusate sodium (SENNA S) 8.6-50 MG tablet  sennosides (SENOKOT) 8.6 MG tablet  STATIN NOT PRESCRIBED (INTENTIONAL)      Allergies   Allergen Reactions     Metformin      Abdominal pain     Milk [Lac Bovis] Hives     Dulaglutide Rash     Family History  Family History   Problem Relation Age of Onset     Diabetes Brother      Diabetes Father      Alzheimer Disease Father      Unknown/Adopted Mother      Diabetes Paternal Grandfather      Cancer No family hx of         no skin cancer     Skin Cancer No family hx of         no famiy hx of skin cancer     Glaucoma No family hx of      Macular Degeneration No family hx of      Social History   Social History     Tobacco Use     Smoking status: Current Some Day Smoker     Packs/day: 0.50     Years: 40.00     Pack years: 20.00     Types:  Cigarettes     Smokeless tobacco: Former User   Substance Use Topics     Alcohol use: No     Alcohol/week: 0.0 standard drinks     Comment: Last etoh in 2007     Drug use: Not Currently     Types: Marijuana, Methamphetamines     Comment: Sober 120 days on 2/28/2020      Past medical history, past surgical history, medications, allergies, family history, and social history were reviewed with the patient. No additional pertinent items.       Review of Systems  A complete review of systems was performed with pertinent positives and negatives noted in the HPI, and all other systems negative.    Physical Exam   BP: 125/83  Pulse: 100  Temp: 99.2  F (37.3  C)  Resp: 16  SpO2: 96 %  Physical Exam  Physical Exam   Constitutional: oriented to person, place, and time. appears well-developed and well-nourished.   HENT:   Head: Normocephalic and atraumatic.   Neck: Normal range of motion.   Pulmonary/Chest: Effort normal. No respiratory distress.   Cardiac: No murmurs, rubs, gallops. RRR.  Abdominal: Abdomen soft, nontender, nondistended. No rebound tenderness.  MSK: Long bones without deformity or evidence of trauma  Neurological: alert and oriented to person, place, and time.   Skin: Skin is warm and dry.   Psychiatric:  normal mood and affect.  behavior is normal. Thought content normal.     ED Course      Procedures             EKG Interpretation:      Interpreted by Venu Wolff MD  Time reviewed: 0320  Symptoms at time of EKG: epigastric pain   Rhythm: normal sinus   Rate: normal  Axis: normal  Ectopy: none  Conduction: normal  ST Segments/ T Waves: No ST-T wave changes  Q Waves: none  Comparison to prior: Unchanged    Clinical Impression: normal EKG           Results for orders placed or performed during the hospital encounter of 08/23/20   EKG 12-lead, tracing only     Status: None (Preliminary result)   Result Value Ref Range    Interpretation ECG Click View Image link to view waveform and result      Medications    lidocaine (XYLOCAINE) 2 % 15 mL, alum & mag hydroxide-simethicone (MAALOX  ES) 15 mL GI Cocktail (has no administration in time range)        Assessments & Plan (with Medical Decision Making)   MDM  Patient presented with epigastric discomfort.  The patient has GERD and he says is the same as his typical symptoms.  He is requesting a GI cocktail.  Denies chest pain, shortness of breath, leg pain, leg swelling, fevers or chills.  EKG here is reassuring, very unlikely cardiac process.  Patient is well-known to emergency department.  Symptoms completely resolved after GI cocktail he is sleeping.  He is requesting discharge with a refill for his medications.  We will provide him for this and discharge patient.  He will return if worsening.    I have reviewed the nursing notes. I have reviewed the findings, diagnosis, plan and need for follow up with the patient.    Current Discharge Medication List          Final diagnoses:   Gastroesophageal reflux disease without esophagitis       --  Venu Wolff  [facility], [place], EMERGENCY DEPARTMENT  8/23/2020     Venu Wolff MD  08/23/20 9115

## 2020-08-23 NOTE — ED TRIAGE NOTES
Arrives ambulatory to triage c/o heartburn. Reports some confusion about some dosing changes and has not taken PTA heartburn meds x2 days. Alert and oriented in triage, appears in no acute distress.

## 2020-08-23 NOTE — ED AVS SNAPSHOT
[facility], [place], Emergency Department  [address] [place] [id number]  Phone:  [phone]                                    Andrew Lockwood   MRN: [id number]    Department:  [facility], Emergency Department   Date of Visit:  8/23/2020           After Visit Summary Signature Page    I have received my discharge instructions, and my questions have been answered. I have discussed any challenges I see with this plan with the nurse or doctor.    ..........................................................................................................................................  Patient/Patient Representative Signature      ..........................................................................................................................................  Patient Representative Print Name and Relationship to Patient    ..................................................               ................................................  Date                                   Time    ..........................................................................................................................................  Reviewed by Signature/Title    ...................................................              ..............................................  Date                                               Time          22EPIC Rev 08/18

## 2020-08-24 ENCOUNTER — TELEPHONE (OUTPATIENT)
Dept: ENDOCRINOLOGY | Facility: CLINIC | Age: 55
End: 2020-08-24

## 2020-08-24 ENCOUNTER — VIRTUAL VISIT (OUTPATIENT)
Dept: ENDOCRINOLOGY | Facility: CLINIC | Age: 55
End: 2020-08-24
Payer: COMMERCIAL

## 2020-08-24 ENCOUNTER — NURSE TRIAGE (OUTPATIENT)
Dept: NURSING | Facility: CLINIC | Age: 55
End: 2020-08-24

## 2020-08-24 DIAGNOSIS — E11.65 TYPE 2 DIABETES MELLITUS WITH HYPERGLYCEMIA, WITH LONG-TERM CURRENT USE OF INSULIN (H): Primary | ICD-10-CM

## 2020-08-24 DIAGNOSIS — Z79.4 TYPE 2 DIABETES MELLITUS WITH HYPERGLYCEMIA, WITH LONG-TERM CURRENT USE OF INSULIN (H): Primary | ICD-10-CM

## 2020-08-24 LAB — INTERPRETATION ECG - MUSE: NORMAL

## 2020-08-24 RX ORDER — INSULIN ASPART 100 [IU]/ML
INJECTION, SOLUTION INTRAVENOUS; SUBCUTANEOUS
Qty: 75 ML | Refills: 1 | Status: SHIPPED | OUTPATIENT
Start: 2020-08-24 | End: 2020-09-16

## 2020-08-24 NOTE — TELEPHONE ENCOUNTER
This message plus current medication list faxed to [facility] [phone].Aleah Hampton RN on 8/24/2020 at 3:10 PM

## 2020-08-24 NOTE — TELEPHONE ENCOUNTER
I was able to connect with Andrew and changes were made to his medication list/insulin regimen. Would you be able to fax these medication changes, per request of Sky Alvarez? Fax number is [phone]. Also, patient phone number on file is incorrect, he got a new number (is now [phone]).    In short, Humalog was changed to Novolog (pt request, citing side effects), and fixed meal doses changed from 15 units TID to 10 units with breakfast, 15 with lunch, and 20 with dinner.       Current Outpatient Medications   Medication Instructions     bictegravir-emtricitabine-tenofovir (BIKTARVY) -25 MG per tablet 1 tablet, Oral, DAILY     blood glucose (NO BRAND SPECIFIED) lancets standard Use to test blood sugar four times daily or as directed.     blood glucose (NO BRAND SPECIFIED) test strip 1 strip, In Vitro, 4 TIMES DAILY BEFORE MEALS & NIGHTLY, Use to test blood sugars 4 times daily or as directed     blood glucose monitoring (NO BRAND SPECIFIED) meter device kit Use to test blood sugar 4 times daily or as directed.     Ciclopirox 8 % KIT 1 Application, Apply externally, DAILY, In evening, remove from nails once weekly with alcohol     docusate sodium (COLACE) 100 mg, Oral, 2 TIMES DAILY PRN     famotidine (PEPCID) 20 mg, Oral, 2 TIMES DAILY     ibuprofen (ADVIL/MOTRIN) 600 mg, Oral, EVERY 6 HOURS PRN     insulin glargine (BASAGLAR KWIKPEN) 100 UNIT/ML pen INJECT 30 UNITS SUBCUTANEOUSLY TWICE DAILY *DISCARD PEN 28 DAYS AFTER FIRST USE, DO NOT REFRIGERATE AFTER FIRST USE*     insulin pen needle (B-D U/F) 31G X 8 MM miscellaneous Use 1 pen needles daily or as directed.     insulin pen needle (BD PEN NEEDLE SCOTT 2ND GEN) 32G X 4 MM miscellaneous Use 3-4 daily.     NOVOLOG FLEXPEN 100 UNIT/ML soln Inject 10 units with breakfast, 15 units with lunch, and 20 units with dinner, along with sliding scale 1 unit per 25mg/dL over 140 before meals and over 200 at bedtime. Max daily dose 80 units     Nutritional Supplements  (GLUCOSE MANAGEMENT) TABS Use 1-2 tabs for hypoglycemia.     ondansetron (ZOFRAN-ODT) 4 mg, Oral, EVERY 8 HOURS PRN     polyethylene glycol (MIRALAX) powder 1 capful, Oral, DAILY PRN     psyllium (METAMUCIL SMOOTH TEXTURE) 28 % packet Use one packet in 8oz water daily.     SENNA-docusate sodium (SENNA S) 8.6-50 MG tablet 1 tablet, Oral, 2 TIMES DAILY, Hold for loose stools.     sennosides (SENOKOT) 8.6 MG tablet 1 tablet, Oral, 2 TIMES DAILY PRN     STATIN NOT PRESCRIBED (INTENTIONAL) Please choose reason not prescribed, below     _________________________________________________________________    After visit summary posted below:     1. Continue Lantus 30 units twice daily. This dose may need to be adjusted, but we will wait to see how the change in Novolog/Humalog dosing goes.   2. We adjusted your short acting insulin dosing. We changed the prescription from Humalog to Novolog, per your request, and report of side effects from the Humalog.    -take 10 units with breakfast, 15 units with lunch, and 20 units with dinner.   3. Continue to use the sliding scale insulin before meals and at bedtime; there was no change to this scale today.       Its important, with fixed meal insulin, to try to eat similar foods at mealtimes every day. Try to aim for 45-60 grams of carbs with each meal. Avoid sugary beverages like soda, gatorade, and juice.     Continue daily activity, like riding your bike. This is healthy, and also helps with blood sugars.     If your blood sugar control does not improve with the above changes, please call the clinic to schedule a follow up in two weeks.       * if you are having your procedure and need to not eat or drink after midnight, please HOLD your evening dose of Lantus, and give half of your morning Lantus dose (15 instead of 30 units). Also hold the fixed mealtime insulin dose if you are not eating a meal. You may continue the sliding scale insulin even if not eating; give this at bedtime  the night before your procedure and in the morning around breakfast time.       Please call the clinic at [phone] to schedule another follow up in 2-3 weeks, if blood sugars are still not well controlled, and we can discuss further changes. Otherwise, follow up in 3 months. .     If you have urgent questions or concerns regarding your blood sugars or insulin, you may contact [phone] (the main hospital ). Ask to speak with the endocrinologist on call.      Thank you for letting the Diabetes Management Team be involved in your care!

## 2020-08-24 NOTE — PROGRESS NOTES
[facility] Endocrinology- Outpatient Diabetes Follow up  Andrew is a 52 year old male with history of uncontrolled TIIDM on insulin, HIV/AIDS ( CD4 401, RNA 79 on 10/31/3019), DANISHA, CNS toxoplasmosis, gastric ulcers chronic abdominal pain, recurrent small bowel obstructions of unknown etiology, and substance abuse currently in outpatient treatment facility/sober living house ([facility]) who is following up today for diabetes. He has had multiple recent ED visits/hospitalizations. The inpatient diabetes service was consulted 2/25/20 for inpatient insulin recommendations, though he was slated to discharge that afternoon. Recommended Lantus 30 units twice daily, 15 units Novolog with each meal, and high intensity sliding scale before meals and at bedtime.     Of note, Andrew was in the ER yesterday with chest pain/reflux, and out of GERD medications. His symptoms resolved with GI cocktail, and he was discharged. He has not followed up in our clinic since 6/2019. He was also in the ER 8/14 with low blood sugar.     He reported prior side effect/intolerance to Metformin, and Glipizide not effective. Also did not tolerate Januvia and Jardiance due to abdominal pain.     Andrew reports variability in his blood sugars. Mostly high, but sometimes have been running low, which is a common issue with non-consistent carb diet on fixed meal insulin. Most of the lower trending BG occur pre-lunch. He will eat three meals a day most days. Breakfast is usually smallest meal, with dinner being the largest meal. He denies any significant snacking. He will only eat overnight if his blood sugar is low. His bike recently got fixed, and he is going for a bike ride every morning, and has noticed his blood sugars are running lower after this. He notes that he is going to have a procedure soon, and blood sugars need to be better controlled first.     Pt denies headaches, visual changes, n/v, SOB at rest, chest pain, abd pain, nausea/vomiting,  diarrhea, dysuria, hematuria or foot ulcers.    Current Diabetes Regimen:   Lantus 30 units twice daily  Humalog 15 units with each meal  Humalog high intensity sliding scale AC/HS    Glucometer Settings  Faxed from [facility]:  Glucoses are extremely variable, with no real observable pattern besides worsening hyperglycemia in the evening.  Pre-breakfast: 130-260's, averaging 15-20 units Novolog  Pre-lunch: 's, averaging 15-20 units Novolog  Pre-dinner: 180-400's, averaging 16-21 units Novolog.    Physical Activity: has started going for a morning bike ride again.  Nutrition: 2-3 meals daily, breakfast is smallest meal, dinner is largest meal.     Diabetes Care  Retinopathy: no, history of retinal detachment s/p laser treatment. Due for follow up, referral placed 2/2020    Nephropathy: No Hx microalbuminuria. Creatinine 0.96(stable). Taking ACEi/ARB: no    Neuropathy: no    Lipids: Taking ASA: no, statin: no (intentionally not Rx due to ART)  LDL Cholesterol Calculated   Date Value Ref Range Status   08/06/2020 81 <100 mg/dL Final     Comment:     Desirable:       <100 mg/dl      ROS: 10 point review of systems completed; pertinent positives and negatives documented in HPI    Allergies  Allergies   Allergen Reactions     Metformin      Abdominal pain     Milk [Lac Bovis] Hives     Dulaglutide Rash       Medications  Current Outpatient Medications   Medication Sig Dispense Refill     bictegravir-emtricitabine-tenofovir (BIKTARVY) -25 MG per tablet Take 1 tablet by mouth daily 30 tablet 11     blood glucose (NO BRAND SPECIFIED) lancets standard Use to test blood sugar four times daily or as directed. 100 each 0     blood glucose (NO BRAND SPECIFIED) test strip 1 strip by In Vitro route 4 times daily (before meals and nightly) Use to test blood sugars 4 times daily or as directed 100 strip 11     blood glucose monitoring (NO BRAND SPECIFIED) meter device kit Use to test blood sugar 4 times daily or as  directed. 1 kit 0     Ciclopirox 8 % KIT Externally apply 1 Application topically daily In evening, remove from nails once weekly with alcohol 34 mL 1     docusate sodium (COLACE) 50 MG capsule Take 2 capsules (100 mg) by mouth 2 times daily as needed for constipation 60 capsule 5     ibuprofen (ADVIL/MOTRIN) 600 MG tablet Take 1 tablet (600 mg) by mouth every 6 hours as needed for moderate pain 30 tablet 0     insulin glargine (BASAGLAR KWIKPEN) 100 UNIT/ML pen INJECT 30 UNITS SUBCUTANEOUSLY TWICE DAILY *DISCARD PEN 28 DAYS AFTER FIRST USE, DO NOT REFRIGERATE AFTER FIRST USE* 15 mL 15     insulin pen needle (B-D U/F) 31G X 8 MM miscellaneous Use 1 pen needles daily or as directed. 100 each 3     insulin pen needle (BD PEN NEEDLE SCOTT 2ND GEN) 32G X 4 MM miscellaneous Use 3-4 daily. 250 each 3     NOVOLOG FLEXPEN 100 UNIT/ML soln Inject 10 units with breakfast, 15 units with lunch, and 20 units with dinner, along with sliding scale 1 unit per 25mg/dL over 140 before meals and over 200 at bedtime. Max daily dose 80 units 75 mL 1     Nutritional Supplements (GLUCOSE MANAGEMENT) TABS Use 1-2 tabs for hypoglycemia. 30 tablet 3     ondansetron (ZOFRAN-ODT) 4 MG ODT tab Take 1 tablet (4 mg) by mouth every 8 hours as needed for nausea 24 tablet 0     polyethylene glycol (MIRALAX) powder Take 17 g (1 capful) by mouth daily as needed for constipation 289 g 0     psyllium (METAMUCIL SMOOTH TEXTURE) 28 % packet Use one packet in 8oz water daily. 30 each 0     SENNA-docusate sodium (SENNA S) 8.6-50 MG tablet Take 1 tablet by mouth 2 times daily Hold for loose stools. 60 tablet 5     sennosides (SENOKOT) 8.6 MG tablet Take 1 tablet by mouth 2 times daily as needed for constipation 60 tablet 5     STATIN NOT PRESCRIBED (INTENTIONAL) Please choose reason not prescribed, below       famotidine (PEPCID) 20 MG tablet Take 1 tablet (20 mg) by mouth 2 times daily 30 tablet 11       Family History  family history includes Alzheimer  Disease in his father; Diabetes in his brother, father, and paternal grandfather; Unknown/Adopted in his mother.    Social History   reports that he has been smoking cigarettes. He has a 20.00 pack-year smoking history. He has quit using smokeless tobacco. He reports previous drug use. Drugs: Marijuana and Methamphetamines. He reports that he does not drink alcohol.     Past Medical History  Past Medical History:   Diagnosis Date     AIDS (H)      Allergic rhinitis due to other allergen     DNS     Anal dysplasia      Chronic abdominal pain      CNS toxoplasmosis (H)      Diabetes type 2, controlled (H)      GERD (gastroesophageal reflux disease)      HIV (human immunodeficiency virus infection) (H)      Periungual wart      Sleep apnea     doesn't use CPAP       Past Surgical History:   Procedure Laterality Date     C NONSPECIFIC PROCEDURE      right forearm fracture     COLONOSCOPY Left 1/22/2016    Procedure: COMBINED COLONOSCOPY, SINGLE OR MULTIPLE BIOPSY/POLYPECTOMY BY BIOPSY;  Surgeon: Clark Saini MD;  Location: [facility] GI     HC EXPLORE UNDESC TESTIS,INGUIN/SCROTAL       LAPAROSCOPIC APPENDECTOMY N/A 1/31/2018    Procedure: LAPAROSCOPIC APPENDECTOMY;  LAPAROSCOPIC APPENDECTOMY;  Surgeon: Dawn Holt MD;  Location: [facility] OR     LAPAROSCOPY DIAGNOSTIC (GENERAL) N/A 7/26/2016    Procedure: LAPAROSCOPY DIAGNOSTIC (GENERAL);  Surgeon: Susannah Arriaga MD;  Location: [facility] OR     LAPAROSCOPY DIAGNOSTIC (GENERAL) N/A 4/16/2018    Procedure: LAPAROSCOPY DIAGNOSTIC (GENERAL);  Diagnostic laparoscopy and lysis of adhesions;  Surgeon: Prince Dowling MD;  Location: [facility] OR     OPTICAL TRACKING SYSTEM CRANIOTOMY, EXCISE TUMOR, COMBINED Left 4/10/2015    Procedure: COMBINED OPTICAL TRACKING SYSTEM CRANIOTOMY, EXCISE TUMOR;  Surgeon: Mirlande Colmenares MD;  Location: [facility] OR     REPAIR GAMEKEEPER'S THUMB Right 12/2/2016    Procedure: REPAIR LIGAMENT ULNAR COLLATERAL THUMB (GAMEKEEPER'S);  Surgeon:  Evin Zamorano MD;  Location:  OR       Physical Exam  There were no vitals taken for this visit.  There is no height or weight on file to calculate BMI.    GENERAL : In no apparent distress over the phone. Sober living staff in separate discussion, not on the call with patient initiatlly.  RESP: normal respiratory effort. No cough  NEURO: awake, alert, responds appropriately to questions.        RESULTS    Lab Results   Component Value Date    A1C 11.6 02/11/2020    A1C 12.3 01/07/2020    A1C 10.4 10/17/2019    A1C 10.2 06/07/2019    A1C 8.9 07/19/2018       Creatinine   Date Value Ref Range Status   08/14/2020 0.96 0.66 - 1.25 mg/dL Final     GFR Estimate   Date Value Ref Range Status   08/14/2020 89 >60 mL/min/[1.73_m2] Final     Comment:     Non  GFR Calc  Starting 12/18/2018, serum creatinine based estimated GFR (eGFR) will be   calculated using the Chronic Kidney Disease Epidemiology Collaboration   (CKD-EPI) equation.       Hemoglobin A1C   Date Value Ref Range Status   02/11/2020 11.6 (H) 0 - 5.6 % Final     Comment:     Normal <5.7% Prediabetes 5.7-6.4%  Diabetes 6.5% or higher - adopted from ADA   consensus guidelines.       Potassium   Date Value Ref Range Status   08/14/2020 3.3 (L) 3.4 - 5.3 mmol/L Final     ALT   Date Value Ref Range Status   08/14/2020 27 0 - 70 U/L Final     AST   Date Value Ref Range Status   08/14/2020 31 0 - 45 U/L Final     TSH   Date Value Ref Range Status   02/11/2020 2.66 0.40 - 4.00 mU/L Final     T4 Free   Date Value Ref Range Status   04/14/2015 1.02 0.76 - 1.46 ng/dL Final       TSH   Date Value Ref Range Status   02/11/2020 2.66 0.40 - 4.00 mU/L Final   02/15/2018 3.57 0.40 - 4.00 mU/L Final   05/23/2017 3.90 0.40 - 4.00 mU/L Final   04/14/2015 2.78 0.40 - 4.00 mU/L Final   04/25/2005 3.75 0.4 - 5.0 mU/L Final     T4 Free   Date Value Ref Range Status   04/14/2015 1.02 0.76 - 1.46 ng/dL Final   04/25/2005 1.19 0.70 - 1.85 ng/dL Final        Creatinine   Date Value Ref Range Status   08/14/2020 0.96 0.66 - 1.25 mg/dL Final       Recent Labs   Lab Test 08/06/20  0842 08/15/18  1402  05/07/15  1035   CHOL 184 180   < > 150   HDL 39* 38*   < > 30*   LDL 81 105*   < > Cannot estimate LDL when triglyceride exceeds 400 mg/dL   TRIG 316* 190*   < > 526*   CHOLHDLRATIO  --   --   --  5.0    < > = values in this interval not displayed.       No results found for: [id number], [id number], [id number]      ASSESSMENT/PLAN:    1. Diabetes type II, uncontrolled. Sounds as though insulin adherence is better, with staff reminding him to administer insulin, and assisting with dosing; BG are extremely variable due to non-consistent carb diet, and patient unable to carb count.    -Lantus 30 unit twice daily   -adjusted fixed meal insulin: 10 units with breakfast, 15 with lunch, and 20 with dinner. He requested change from Humalog to Novolog, due to his concern that Humalog doesn't work for him and is causing peripheral edema.    -continue Novolog/Humalog sliding scale 1 unit per 25 over 140 C, >200 HS    -detailed instructions on insulin adjustment in the face of NPO status for procedure: hold evening Lantus night before, and administer half dose (15 units) morning of procedure. Do not administer fixed meal insulin while NPO/skipping a meal. OK to continue sliding scale while NPO.    -long term, should discuss re-trial of alternative agents such as metformin, GLP1 agonist, to help control BG and reduce insulin requirements.       Follow up:  1. With [facility] endocrinology in 2-4 weeks.     This patient has met [place] community measures for diabetes control: no (D5: Control blood pressure ,Lower bad cholesterol Maintain blood sugar, Be tobacco-free, Take aspirin as recommended)    This patient is eligible for graduation from [facility] Endocrinology clinic: no    Follow up was done today via phone encounter, due to high risk patients implementing social distancing due to  COVID 19.     I spent a total of 10 minutes via telephone with patient, and separately with staff , and  and 15 minutes chart review.    Haley Shrestha PA-C  Diabetes Management Service  Pager [phone]

## 2020-08-24 NOTE — PATIENT INSTRUCTIONS
1. Continue Lantus 30 units twice daily. This dose may need to be adjusted, but we will wait to see how the change in Novolog/Humalog dosing goes.   2. We adjusted your short acting insulin dosing. We changed the prescription from Humalog to Novolog, per your request, and report of side effects from the Humalog.    -take 10 units with breakfast, 15 units with lunch, and 20 units with dinner.   3. Continue to use the sliding scale insulin before meals and at bedtime; there was no change to this scale today.       Its important, with fixed meal insulin, to try to eat similar foods at mealtimes every day. Try to aim for 45-60 grams of carbs with each meal. Avoid sugary beverages like soda, gatorade, and juice.     Continue daily activity, like riding your bike. This is healthy, and also helps with blood sugars.     If your blood sugar control does not improve with the above changes, please call the clinic to schedule a follow up in two weeks.       * if you are having your procedure and need to not eat or drink after midnight, please HOLD your evening dose of Lantus, and give half of your morning Lantus dose (15 instead of 30 units). Also hold the fixed mealtime insulin dose if you are not eating a meal. You may continue the sliding scale insulin even if not eating; give this at bedtime the night before your procedure and in the morning around breakfast time.       Please call the clinic at [phone] to schedule another follow up in 2-3 weeks, if blood sugars are still not well controlled, and we can discuss further changes. Otherwise, follow up in 3 months. .     If you have urgent questions or concerns regarding your blood sugars or insulin, you may contact [phone] (the main hospital ). Ask to speak with the endocrinologist on call.      Thank you for letting the Diabetes Management Team be involved in your care!

## 2020-08-24 NOTE — LETTER
"8/24/2020       RE: Andrew Lockwood  [address] [facility] [address]     Dear Colleague,    Thank you for referring your patient, Andrew Lockwood, to the [facility] ENDOCRINOLOGY at [facility]. Please see a copy of my visit note below.    Andrew Lockwood is a 52 year old male who is being evaluated via a billable telephone visit.      The patient has been notified of following:     \"This telephone visit will be conducted via a call between you and your physician/provider. We have found that certain health care needs can be provided without the need for a physical exam.  This service lets us provide the care you need with a short phone conversation.  If a prescription is necessary we can send it directly to your pharmacy.  If lab work is needed we can place an order for that and you can then stop by our lab to have the test done at a later time.    Telephone visits are billed at different rates depending on your insurance coverage. During this emergency period, for some insurers they may be billed the same as an in-person visit.  Please reach out to your insurance provider with any questions.    If during the course of the call the physician/provider feels a telephone visit is not appropriate, you will not be charged for this service.\"    Patient has given verbal consent for Telephone visit?  Yes    What phone number would you like to be contacted at? [phone]    How would you like to obtain your AVS? Mail a copy    Radha Tovar MA    Patients Glucose Data was Sent via Email          [facility]- Outpatient Diabetes Follow up  Andrew is a 52 year old male with history of uncontrolled TIIDM on insulin, HIV/AIDS ( CD4 401, RNA 79 on 10/31/3019), DANISHA, CNS toxoplasmosis, gastric ulcers chronic abdominal pain, recurrent small bowel obstructions of unknown etiology, and substance abuse currently in outpatient treatment facility/sober living house ([facility]) who is " following up today for diabetes. He has had multiple recent ED visits/hospitalizations. The inpatient diabetes service was consulted 2/25/20 for inpatient insulin recommendations, though he was slated to discharge that afternoon. Recommended Lantus 30 units twice daily, 15 units Novolog with each meal, and high intensity sliding scale before meals and at bedtime.     Of note, Andrew was in the ER yesterday with chest pain/reflux, and out of GERD medications. His symptoms resolved with GI cocktail, and he was discharged. He has not followed up in our clinic since 6/2019. He was also in the ER 8/14 with low blood sugar.     He reported prior side effect/intolerance to Metformin, and Glipizide not effective. Also did not tolerate Januvia and Jardiance due to abdominal pain.     Andrew reports variability in his blood sugars. Mostly high, but sometimes have been running low, which is a common issue with non-consistent carb diet on fixed meal insulin. Most of the lower trending BG occur pre-lunch. He will eat three meals a day most days. Breakfast is usually smallest meal, with dinner being the largest meal. He denies any significant snacking. He will only eat overnight if his blood sugar is low. His bike recently got fixed, and he is going for a bike ride every morning, and has noticed his blood sugars are running lower after this. He notes that he is going to have a procedure soon, and blood sugars need to be better controlled first.     Pt denies headaches, visual changes, n/v, SOB at rest, chest pain, abd pain, nausea/vomiting, diarrhea, dysuria, hematuria or foot ulcers.    Current Diabetes Regimen:   Lantus 30 units twice daily  Humalog 15 units with each meal  Humalog high intensity sliding scale AC/HS    Glucometer Settings  Faxed from [facility]:  Glucoses are extremely variable, with no real observable pattern besides worsening hyperglycemia in the evening.  Pre-breakfast: 130-260's, averaging 15-20 units  Novolog  Pre-lunch: 's, averaging 15-20 units Novolog  Pre-dinner: 180-400's, averaging 16-21 units Novolog.    Physical Activity: has started going for a morning bike ride again.  Nutrition: 2-3 meals daily, breakfast is smallest meal, dinner is largest meal.     Diabetes Care  Retinopathy: no, history of retinal detachment s/p laser treatment. Due for follow up, referral placed 2/2020    Nephropathy: No Hx microalbuminuria. Creatinine 0.96(stable). Taking ACEi/ARB: no    Neuropathy: no    Lipids: Taking ASA: no, statin: no (intentionally not Rx due to ART)  LDL Cholesterol Calculated   Date Value Ref Range Status   08/06/2020 81 <100 mg/dL Final     Comment:     Desirable:       <100 mg/dl      ROS: 10 point review of systems completed; pertinent positives and negatives documented in HPI    Allergies  Allergies   Allergen Reactions     Metformin      Abdominal pain     Milk [Lac Bovis] Hives     Dulaglutide Rash       Medications  Current Outpatient Medications   Medication Sig Dispense Refill     bictegravir-emtricitabine-tenofovir (BIKTARVY) -25 MG per tablet Take 1 tablet by mouth daily 30 tablet 11     blood glucose (NO BRAND SPECIFIED) lancets standard Use to test blood sugar four times daily or as directed. 100 each 0     blood glucose (NO BRAND SPECIFIED) test strip 1 strip by In Vitro route 4 times daily (before meals and nightly) Use to test blood sugars 4 times daily or as directed 100 strip 11     blood glucose monitoring (NO BRAND SPECIFIED) meter device kit Use to test blood sugar 4 times daily or as directed. 1 kit 0     Ciclopirox 8 % KIT Externally apply 1 Application topically daily In evening, remove from nails once weekly with alcohol 34 mL 1     docusate sodium (COLACE) 50 MG capsule Take 2 capsules (100 mg) by mouth 2 times daily as needed for constipation 60 capsule 5     ibuprofen (ADVIL/MOTRIN) 600 MG tablet Take 1 tablet (600 mg) by mouth every 6 hours as needed for moderate  pain 30 tablet 0     insulin glargine (BASAGLAR KWIKPEN) 100 UNIT/ML pen INJECT 30 UNITS SUBCUTANEOUSLY TWICE DAILY *DISCARD PEN 28 DAYS AFTER FIRST USE, DO NOT REFRIGERATE AFTER FIRST USE* 15 mL 15     insulin pen needle (B-D U/F) 31G X 8 MM miscellaneous Use 1 pen needles daily or as directed. 100 each 3     insulin pen needle (BD PEN NEEDLE SCOTT 2ND GEN) 32G X 4 MM miscellaneous Use 3-4 daily. 250 each 3     NOVOLOG FLEXPEN 100 UNIT/ML soln Inject 10 units with breakfast, 15 units with lunch, and 20 units with dinner, along with sliding scale 1 unit per 25mg/dL over 140 before meals and over 200 at bedtime. Max daily dose 80 units 75 mL 1     Nutritional Supplements (GLUCOSE MANAGEMENT) TABS Use 1-2 tabs for hypoglycemia. 30 tablet 3     ondansetron (ZOFRAN-ODT) 4 MG ODT tab Take 1 tablet (4 mg) by mouth every 8 hours as needed for nausea 24 tablet 0     polyethylene glycol (MIRALAX) powder Take 17 g (1 capful) by mouth daily as needed for constipation 289 g 0     psyllium (METAMUCIL SMOOTH TEXTURE) 28 % packet Use one packet in 8oz water daily. 30 each 0     SENNA-docusate sodium (SENNA S) 8.6-50 MG tablet Take 1 tablet by mouth 2 times daily Hold for loose stools. 60 tablet 5     sennosides (SENOKOT) 8.6 MG tablet Take 1 tablet by mouth 2 times daily as needed for constipation 60 tablet 5     STATIN NOT PRESCRIBED (INTENTIONAL) Please choose reason not prescribed, below       famotidine (PEPCID) 20 MG tablet Take 1 tablet (20 mg) by mouth 2 times daily 30 tablet 11       Family History  family history includes Alzheimer Disease in his father; Diabetes in his brother, father, and paternal grandfather; Unknown/Adopted in his mother.    Social History   reports that he has been smoking cigarettes. He has a 20.00 pack-year smoking history. He has quit using smokeless tobacco. He reports previous drug use. Drugs: Marijuana and Methamphetamines. He reports that he does not drink alcohol.     Past Medical  History  Past Medical History:   Diagnosis Date     AIDS (H)      Allergic rhinitis due to other allergen     DNS     Anal dysplasia      Chronic abdominal pain      CNS toxoplasmosis (H)      Diabetes type 2, controlled (H)      GERD (gastroesophageal reflux disease)      HIV (human immunodeficiency virus infection) (H)      Periungual wart      Sleep apnea     doesn't use CPAP       Past Surgical History:   Procedure Laterality Date     C NONSPECIFIC PROCEDURE      right forearm fracture     COLONOSCOPY Left 1/22/2016    Procedure: COMBINED COLONOSCOPY, SINGLE OR MULTIPLE BIOPSY/POLYPECTOMY BY BIOPSY;  Surgeon: Clark Saini MD;  Location: [facility] GI     HC EXPLORE UNDESC TESTIS,INGUIN/SCROTAL       LAPAROSCOPIC APPENDECTOMY N/A 1/31/2018    Procedure: LAPAROSCOPIC APPENDECTOMY;  LAPAROSCOPIC APPENDECTOMY;  Surgeon: Dawn Holt MD;  Location: [facility] OR     LAPAROSCOPY DIAGNOSTIC (GENERAL) N/A 7/26/2016    Procedure: LAPAROSCOPY DIAGNOSTIC (GENERAL);  Surgeon: Susannah Arriaga MD;  Location: [facility] OR     LAPAROSCOPY DIAGNOSTIC (GENERAL) N/A 4/16/2018    Procedure: LAPAROSCOPY DIAGNOSTIC (GENERAL);  Diagnostic laparoscopy and lysis of adhesions;  Surgeon: Prince Dowling MD;  Location: [facility] OR     OPTICAL TRACKING SYSTEM CRANIOTOMY, EXCISE TUMOR, COMBINED Left 4/10/2015    Procedure: COMBINED OPTICAL TRACKING SYSTEM CRANIOTOMY, EXCISE TUMOR;  Surgeon: Mirlande Colmenares MD;  Location: [facility] OR     REPAIR GAMEKEEPER'S THUMB Right 12/2/2016    Procedure: REPAIR LIGAMENT ULNAR COLLATERAL THUMB (GAMEKEEPER'S);  Surgeon: Evin Zamorano MD;  Location: [facility] OR       Physical Exam  There were no vitals taken for this visit.  There is no height or weight on file to calculate BMI.    GENERAL : In no apparent distress over the phone. Sober living staff in separate discussion, not on the call with patient initiatlly.  RESP: normal respiratory effort. No cough  NEURO: awake, alert, responds appropriately  to questions.        RESULTS    Lab Results   Component Value Date    A1C 11.6 02/11/2020    A1C 12.3 01/07/2020    A1C 10.4 10/17/2019    A1C 10.2 06/07/2019    A1C 8.9 07/19/2018       Creatinine   Date Value Ref Range Status   08/14/2020 0.96 0.66 - 1.25 mg/dL Final     GFR Estimate   Date Value Ref Range Status   08/14/2020 89 >60 mL/min/[1.73_m2] Final     Comment:     Non  GFR Calc  Starting 12/18/2018, serum creatinine based estimated GFR (eGFR) will be   calculated using the Chronic Kidney Disease Epidemiology Collaboration   (CKD-EPI) equation.       Hemoglobin A1C   Date Value Ref Range Status   02/11/2020 11.6 (H) 0 - 5.6 % Final     Comment:     Normal <5.7% Prediabetes 5.7-6.4%  Diabetes 6.5% or higher - adopted from ADA   consensus guidelines.       Potassium   Date Value Ref Range Status   08/14/2020 3.3 (L) 3.4 - 5.3 mmol/L Final     ALT   Date Value Ref Range Status   08/14/2020 27 0 - 70 U/L Final     AST   Date Value Ref Range Status   08/14/2020 31 0 - 45 U/L Final     TSH   Date Value Ref Range Status   02/11/2020 2.66 0.40 - 4.00 mU/L Final     T4 Free   Date Value Ref Range Status   04/14/2015 1.02 0.76 - 1.46 ng/dL Final       TSH   Date Value Ref Range Status   02/11/2020 2.66 0.40 - 4.00 mU/L Final   02/15/2018 3.57 0.40 - 4.00 mU/L Final   05/23/2017 3.90 0.40 - 4.00 mU/L Final   04/14/2015 2.78 0.40 - 4.00 mU/L Final   04/25/2005 3.75 0.4 - 5.0 mU/L Final     T4 Free   Date Value Ref Range Status   04/14/2015 1.02 0.76 - 1.46 ng/dL Final   04/25/2005 1.19 0.70 - 1.85 ng/dL Final       Creatinine   Date Value Ref Range Status   08/14/2020 0.96 0.66 - 1.25 mg/dL Final       Recent Labs   Lab Test 08/06/20  0842 08/15/18  1402  05/07/15  1035   CHOL 184 180   < > 150   HDL 39* 38*   < > 30*   LDL 81 105*   < > Cannot estimate LDL when triglyceride exceeds 400 mg/dL   TRIG 316* 190*   < > 526*   CHOLHDLRATIO  --   --   --  5.0    < > = values in this interval not displayed.        No results found for: [id number], [id number], [id number]      ASSESSMENT/PLAN:    1. Diabetes type II, uncontrolled. Sounds as though insulin adherence is better, with staff reminding him to administer insulin, and assisting with dosing; BG are extremely variable due to non-consistent carb diet, and patient unable to carb count.    -Lantus 30 unit twice daily   -adjusted fixed meal insulin: 10 units with breakfast, 15 with lunch, and 20 with dinner. He requested change from Humalog to Novolog, due to his concern that Humalog doesn't work for him and is causing peripheral edema.    -continue Novolog/Humalog sliding scale 1 unit per 25 over 140 C, >200 HS    -detailed instructions on insulin adjustment in the face of NPO status for procedure: hold evening Lantus night before, and administer half dose (15 units) morning of procedure. Do not administer fixed meal insulin while NPO/skipping a meal. OK to continue sliding scale while NPO.    -long term, should discuss re-trial of alternative agents such as metformin, GLP1 agonist, to help control BG and reduce insulin requirements.       Follow up:  1. With [facility] endocrinology in 2-4 weeks.     This patient has met [place] community measures for diabetes control: no (D5: Control blood pressure ,Lower bad cholesterol Maintain blood sugar, Be tobacco-free, Take aspirin as recommended)    This patient is eligible for graduation from [facility] Endocrinology clinic: no    Follow up was done today via phone encounter, due to high risk patients implementing social distancing due to COVID 19.     I spent a total of 10 minutes via telephone with patient, and separately with staff , and  and 15 minutes chart review.    Haley Shrestha PA-C  Diabetes Management Service  Pager [phone]

## 2020-08-24 NOTE — TELEPHONE ENCOUNTER
Caller is  at patient's sober house;  States they are instructed to call if  bG was 371   States she is unsure who to call exactly so called hospital where  He was  Treated yesterday for chest pain  Review of  EMR reveals patient had V V with  [facility] endocrinologist today and new orders were placed.   Patient is also  followed by   Primary Care Clinic  Reviewed new orders with patient and staff that instruct to give ;     NOVOLOG FLEXPEN 100 UNIT/ML soln  75 mL  1  8/24/2020   Yes    Sig: Inject 10 units with breakfast, 15 units with lunch, and 20 units with dinner, along with sliding scale 1 unit per 25mg/dL over 140 before meals and over 200 at bedtime.       Triage protocol  And  EMR reviewed  including today's V V with endocrinology    New order covers  This bG so  PCP was not  contacted.     Caller was included in phone call and he states he thought the  the new orders were for when he got Novolog instead of Humalog  Caller instructed to use new order with Humalog too     Calculated  sliding scale dose with resident attendant and instructed to give  additional  9 units of Humalog now.   Is to check bG at HS and give  Sliding scale per HS formula. Resident attendant  demonstrates  ability to do calculation   Advised to observe patient for hypoglycemia and reviewed symptoms; patient has  Glucose tablets to give   Advised to call  EMS for hypoglycemia after initiating treatment   Resident  Attendant understands    Further advised to call [facility] To report bG's > 300 and given clinic number. [phone]    Advised to call back for any further questions  or concerns      Elli Boles RN  FNA

## 2020-08-25 ENCOUNTER — TELEPHONE (OUTPATIENT)
Dept: ENDOCRINOLOGY | Facility: CLINIC | Age: 55
End: 2020-08-25

## 2020-08-25 NOTE — TELEPHONE ENCOUNTER
See previous encounter yesterday   This message plus current medication list faxed to [facility] [phone].Aleah Hampton RN on 8/24/2020 at 3:10 PM

## 2020-08-25 NOTE — TELEPHONE ENCOUNTER
LVM for patient to schedule follow-up.    Sending message to navigator team to fax the following per checkout note.      Please fax new medication list/ insulin dosing to [facility]: [phone]. I did verbally discuss the changes made with staff at completion of clinic visit.  [[facility] [phone]    Maggy Callahan

## 2020-08-25 NOTE — TELEPHONE ENCOUNTER
Additional Information    Negative: Unconscious or difficult to awaken    Negative: Acting confused (e.g., disoriented, slurred speech)    Negative: Very weak (e.g., can't stand)    Negative: Sounds like a life-threatening emergency to the triager    Negative: [1] Vomiting AND [2] signs of dehydration (e.g., very dry mouth, lightheaded, dark urine)    Negative: [1] Blood glucose > 240 mg/dL (13.3 mmol/L) AND [2] rapid breathing    Negative: Blood glucose > 500 mg/dL (27.8 mmol/L)    Negative: [1] Blood glucose > 240 mg/dL (13.3 mmol/L) AND [2] urine ketones moderate-large (or more than 1+)    Negative: [1] Blood glucose > 240 mg/dL (13.3 mmol/L) AND [2] blood ketones > 1.4 mmol/L    Negative: [1] Blood glucose > 240 mg/dL (13.3 mmol/L) AND [2] vomiting AND [3] unable to check for ketones (in blood or urine)    Negative: [1] New onset Diabetes suspected (e.g., frequent urination, weak, weight loss) AND [2] vomiting or rapid breathing    Negative: Vomiting lasts > 4 hours    Negative: Patient sounds very sick or weak to the triager    Negative: Fever > 100.5 F (38.1 C)    Negative: Blood glucose > 400 mg/dL (22.2 mmol/L)    Negative: [1] Blood glucose > 300 mg/dL (16.7 mmol/L) AND [2] two or more times in a row    Negative: Urine ketones moderate - large (or blood ketones > 1.4 mmol/L)    Negative: [1] Caller has URGENT medication or insulin pump question AND [2] triager unable to answer question    Negative: [1] Symptoms of high blood sugar (e.g., frequent urination, weak, weight loss) AND [2] not able to test blood glucose    Negative: New onset diabetes suspected (e.g., frequent urination, weakness, weight loss)    Negative: [1] Caller has NON-URGENT medication or insulin pump question AND [2] triager unable to answer question    [1] Blood glucose 240 - 300 mg/dL (13.3 - 16.7 mmol/L) AND [2] uses insulin (e.g., insulin-dependent, all people with type 1 diabetes)     BG = 371 new sliding scale order  today    Protocols used: DIABETES - HIGH BLOOD SUGAR-A-AH

## 2020-08-25 NOTE — TELEPHONE ENCOUNTER
Per Haley Shrestha, 8/24 Checkout:    Please fax new medication list/ insulin dosing to [facility]:   [phone]

## 2020-08-27 ENCOUNTER — ANESTHESIA EVENT (OUTPATIENT)
Dept: SURGERY | Facility: CLINIC | Age: 55
End: 2020-08-27
Payer: COMMERCIAL

## 2020-08-27 ENCOUNTER — OFFICE VISIT (OUTPATIENT)
Dept: SURGERY | Facility: CLINIC | Age: 55
End: 2020-08-27
Payer: COMMERCIAL

## 2020-08-27 ENCOUNTER — OFFICE VISIT (OUTPATIENT)
Dept: FAMILY MEDICINE | Facility: CLINIC | Age: 55
End: 2020-08-27
Payer: COMMERCIAL

## 2020-08-27 ENCOUNTER — PRE VISIT (OUTPATIENT)
Dept: SURGERY | Facility: CLINIC | Age: 55
End: 2020-08-27

## 2020-08-27 VITALS
DIASTOLIC BLOOD PRESSURE: 81 MMHG | BODY MASS INDEX: 33.99 KG/M2 | WEIGHT: 199.1 LBS | OXYGEN SATURATION: 96 % | RESPIRATION RATE: 24 BRPM | HEART RATE: 100 BPM | TEMPERATURE: 98.3 F | HEIGHT: 64 IN | SYSTOLIC BLOOD PRESSURE: 128 MMHG

## 2020-08-27 VITALS
WEIGHT: 199.1 LBS | SYSTOLIC BLOOD PRESSURE: 128 MMHG | BODY MASS INDEX: 34.18 KG/M2 | HEART RATE: 100 BPM | DIASTOLIC BLOOD PRESSURE: 81 MMHG | OXYGEN SATURATION: 97 %

## 2020-08-27 DIAGNOSIS — E11.65 TYPE 2 DIABETES MELLITUS WITH HYPERGLYCEMIA, WITH LONG-TERM CURRENT USE OF INSULIN (H): Primary | ICD-10-CM

## 2020-08-27 DIAGNOSIS — Z01.818 PRE-OP EXAMINATION: Primary | ICD-10-CM

## 2020-08-27 DIAGNOSIS — R91.8 PULMONARY NODULES: Primary | ICD-10-CM

## 2020-08-27 DIAGNOSIS — Z79.4 TYPE 2 DIABETES MELLITUS WITH HYPERGLYCEMIA, WITH LONG-TERM CURRENT USE OF INSULIN (H): Primary | ICD-10-CM

## 2020-08-27 DIAGNOSIS — K62.82 ANAL DYSPLASIA: ICD-10-CM

## 2020-08-27 LAB
HBA1C MFR BLD: 8.2 % (ref 0–5.6)
POTASSIUM SERPL-SCNC: 3.5 MMOL/L (ref 3.4–5.3)

## 2020-08-27 RX ORDER — INSULIN LISPRO 100 [IU]/ML
INJECTION, SOLUTION INTRAVENOUS; SUBCUTANEOUS
Qty: 75 ML | Refills: 3 | Status: SHIPPED | OUTPATIENT
Start: 2020-08-27 | End: 2020-08-27

## 2020-08-27 SDOH — HEALTH STABILITY: MENTAL HEALTH: HOW MANY STANDARD DRINKS CONTAINING ALCOHOL DO YOU HAVE ON A TYPICAL DAY?: PATIENT DECLINED

## 2020-08-27 ASSESSMENT — MIFFLIN-ST. JEOR: SCORE: 1654.11

## 2020-08-27 ASSESSMENT — LIFESTYLE VARIABLES: TOBACCO_USE: 1

## 2020-08-27 ASSESSMENT — ENCOUNTER SYMPTOMS: SEIZURES: 1

## 2020-08-27 ASSESSMENT — PAIN SCALES - GENERAL: PAINLEVEL: NO PAIN (0)

## 2020-08-27 NOTE — NURSING NOTE
Chief Complaint   Patient presents with     Results     pt would like to discuss chest CT     Stephanie Rivas EMT at 1:25 PM sign on 8/27/2020

## 2020-08-27 NOTE — PROGRESS NOTES
SUBJECTIVE:    Pt is a 52 year old male with pmh of     Patient Active Problem List   Diagnosis     Allergic rhinitis due to other allergen     Brain lesion     Toxoplasma encephalitis (H)     Pulmonary nodules     Human immunodeficiency virus I infection (H)     Folliculitis     Prurigo nodularis     Epidermal cyst     Shoulder joint pain, unspecified laterality     CNS toxoplasmosis (H)     Constipation     Non-intractable vomiting with nausea, vomiting of unspecified type     Thrush     Insomnia, unspecified insomnia     Bowel obstruction (H)     Toxoplasmosis     Gastroesophageal reflux disease     Headache     Aphthous ulcer     Type 2 diabetes mellitus (H)     Small bowel obstruction (H)     SBO (small bowel obstruction) (H)     Slow transit constipation     Periungual wart     Herpes zoster     Erectile dysfunction, unspecified erectile dysfunction type     Insomnia, unspecified type     Preventative health care     Pain of right thumb     Rupture of ulnar collateral ligament of right thumb     Aftercare following surgery of the musculoskeletal system     Panic disorder     Generalized anxiety disorder     Major depressive disorder, single episode, unspecified     Abdominal pain     Acute appendicitis with localized peritonitis     S/P appendectomy     Abdominal abscess     Appendicitis     Otitis media     Horseshoe tear of retina of left eye without detachment     Adynamic ileus (H)     Pneumonia     Hyperglycemia     Anal dysplasia     Cellulitis       who is here for evaluation of had concerns including Results (pt would like to discuss chest CT).    Here for a f/u chest ct July.  Rvwd  Two nodules, no change from 2019 ct  Smokes lightly, max 4/day, trying to quit, usually rolled cigs  I suggested Jan 2021 redo ct    No other c/o, encouraged get flu shot, we don't have any yet    Past Medical History:   Diagnosis Date     AIDS (H)      Allergic rhinitis due to other allergen     DNS     Anal dysplasia       Chronic abdominal pain      CNS toxoplasmosis (H)      Diabetes type 2, controlled (H)      GERD (gastroesophageal reflux disease)      HIV (human immunodeficiency virus infection) (H)      Periungual wart      Sleep apnea     doesn't use CPAP     Past Surgical History:   Procedure Laterality Date     C NONSPECIFIC PROCEDURE      right forearm fracture     COLONOSCOPY Left 1/22/2016    Procedure: COMBINED COLONOSCOPY, SINGLE OR MULTIPLE BIOPSY/POLYPECTOMY BY BIOPSY;  Surgeon: Clark Saini MD;  Location: [facility]     HC EXPLORE UNDESC TESTIS,INGUIN/SCROTAL       LAPAROSCOPIC APPENDECTOMY N/A 1/31/2018    Procedure: LAPAROSCOPIC APPENDECTOMY;  LAPAROSCOPIC APPENDECTOMY;  Surgeon: Dawn Holt MD;  [facility]     LAPAROSCOPY DIAGNOSTIC (GENERAL) N/A 7/26/2016    Procedure: LAPAROSCOPY DIAGNOSTIC (GENERAL);  Surgeon: Susannah Arriaga MD;  [facility]     LAPAROSCOPY DIAGNOSTIC (GENERAL) N/A 4/16/2018    Procedure: LAPAROSCOPY DIAGNOSTIC (GENERAL);  Diagnostic laparoscopy and lysis of adhesions;  Surgeon: Prince Dowling MD;  [facility]     OPTICAL TRACKING SYSTEM CRANIOTOMY, EXCISE TUMOR, COMBINED Left 4/10/2015    Procedure: COMBINED OPTICAL TRACKING SYSTEM CRANIOTOMY, EXCISE TUMOR;  Surgeon: Mirlande Colmenares MD;  [facility]     REPAIR GAMEKEEPER'S THUMB Right 12/2/2016    Procedure: REPAIR LIGAMENT ULNAR COLLATERAL THUMB (GAMEKEEPER'S);  Surgeon: Evin Zamorano MD;  [facility]     Allergies   Allergen Reactions     Metformin      Abdominal pain     Milk [Lac Bovis] Hives     Dulaglutide Rash     No pulm sx currently on ROS      Current Outpatient Medications   Medication Sig Dispense Refill     bictegravir-emtricitabine-tenofovir (BIKTARVY) -25 MG per tablet Take 1 tablet by mouth daily 30 tablet 11     blood glucose (NO BRAND SPECIFIED) lancets standard Use to test blood sugar four times daily or as directed. 100 each 0     blood glucose (NO  BRAND SPECIFIED) test strip 1 strip by In Vitro route 4 times daily (before meals and nightly) Use to test blood sugars 4 times daily or as directed 100 strip 11     blood glucose monitoring (NO BRAND SPECIFIED) meter device kit Use to test blood sugar 4 times daily or as directed. 1 kit 0     Ciclopirox 8 % KIT Externally apply 1 Application topically daily In evening, remove from nails once weekly with alcohol 34 mL 1     docusate sodium (COLACE) 50 MG capsule Take 2 capsules (100 mg) by mouth 2 times daily as needed for constipation 60 capsule 5     famotidine (PEPCID) 20 MG tablet Take 1 tablet (20 mg) by mouth 2 times daily 30 tablet 11     HUMALOG KWIKPEN 100 UNIT/ML soln Inject 10 units with breakfast, 15 units with lunch, and 20 units with dinner, along with sliding scale 1 unit per 25mg/dL over 140 before meals and over 200 at bedtime. Max daily dose 80 units, Disp-75 mL,R-1,OLESYA, E-Prescribe 75 mL 3     ibuprofen (ADVIL/MOTRIN) 600 MG tablet Take 1 tablet (600 mg) by mouth every 6 hours as needed for moderate pain 30 tablet 0     insulin glargine (BASAGLAR KWIKPEN) 100 UNIT/ML pen INJECT 30 UNITS SUBCUTANEOUSLY TWICE DAILY *DISCARD PEN 28 DAYS AFTER FIRST USE, DO NOT REFRIGERATE AFTER FIRST USE* 15 mL 15     insulin pen needle (B-D U/F) 31G X 8 MM miscellaneous Use 1 pen needles daily or as directed. 100 each 3     insulin pen needle (BD PEN NEEDLE SCOTT 2ND GEN) 32G X 4 MM miscellaneous Use 3-4 daily. 250 each 3     NOVOLOG FLEXPEN 100 UNIT/ML soln Inject 10 units with breakfast, 15 units with lunch, and 20 units with dinner, along with sliding scale 1 unit per 25mg/dL over 140 before meals and over 200 at bedtime. Max daily dose 80 units 75 mL 1     Nutritional Supplements (GLUCOSE MANAGEMENT) TABS Use 1-2 tabs for hypoglycemia. 30 tablet 3     ondansetron (ZOFRAN-ODT) 4 MG ODT tab Take 1 tablet (4 mg) by mouth every 8 hours as needed for nausea 24 tablet 0     polyethylene glycol (MIRALAX) powder Take 17  g (1 capful) by mouth daily as needed for constipation 289 g 0     psyllium (METAMUCIL SMOOTH TEXTURE) 28 % packet Use one packet in 8oz water daily. 30 each 0     SENNA-docusate sodium (SENNA S) 8.6-50 MG tablet Take 1 tablet by mouth 2 times daily Hold for loose stools. 60 tablet 5     sennosides (SENOKOT) 8.6 MG tablet Take 1 tablet by mouth 2 times daily as needed for constipation 60 tablet 5     STATIN NOT PRESCRIBED (INTENTIONAL) Please choose reason not prescribed, below         Social History     Tobacco Use     Smoking status: Current Some Day Smoker     Packs/day: 0.50     Years: 40.00     Pack years: 20.00     Types: Cigarettes     Smokeless tobacco: Former User   Substance Use Topics     Alcohol use: No     Alcohol/week: 0.0 standard drinks     Comment: Last etoh in 2007     Drug use: Not Currently     Types: Marijuana, Methamphetamines     Comment: Sober 120 days on 2/28/2020         OBJECTIVE:  /81 (BP Location: Right arm, Patient Position: Sitting, Cuff Size: Adult Regular)   Pulse 100   Wt 90.3 kg (199 lb 1.6 oz)   SpO2 97%   BMI 34.18 kg/m    GENERAL APPEARANCE: Alert, no acute distress  NEURO: Alert, oriented, speech and mentation normal  PSYCHE: mentation appears normal, affect and mood normal    ASSESSMENT/PLAN:    Pulm nodules:  Redo ct Jan 2021, he agrees   Keep trying to quit smoking  I did ask he had no other c/o today  ALEJANDRINA COTE MD

## 2020-08-27 NOTE — H&P
Pre-Operative H & P     CC:  Preoperative exam to assess for increased cardiopulmonary risk while undergoing surgery and anesthesia.    Date of Encounter: 8/27/2020  Primary Care Physician:  Caitie Lamb     Reason for visit: pre operative examination, anal dysplasia    HPI  Andrew Lockwood is a 52 year old male who presents for pre-operative H & P in preparation for anoscopy with Dr. Lundberg on 9/9/20 at [facility].     The patient is a 52-year-old man with a past medical history significant for hyperlipidemia, untreated obstructive sleep apnea, allergies, tobacco use, history of CNS toxoplasmosis, history of seizures, type 2 diabetes uncontrolled, GERD, chronic abdominal pain, history of SBO, HIV and AIDS, recent cellulitis, history of substance abuse and PTSD.  Patient has been followed by the colorectal surgery team for his chronic abdominal pain and recurrent small bowel obstructions.  Given the patient's HIV AIDS status he is also had anal cytology done which did show LSIL.  He has followed up with high resolution anoscopy.  This unfortunately has not been tolerated in the clinic and thus the patient was counseled on the procedure as above.  Most recently secondary to the patient's social and living status the procedure was canceled on 8/21/2020.  The patient has now been rescheduled for the procedure as above.    History is obtained from the patient and chart review    Past Medical History  Past Medical History:   Diagnosis Date     AIDS (H)      Allergic rhinitis due to other allergen     DNS     Anal dysplasia      Chronic abdominal pain      CNS toxoplasmosis (H)      Diabetes type 2, controlled (H)      GERD (gastroesophageal reflux disease)      History of seizure      History of substance abuse (H)      HIV (human immunodeficiency virus infection) (H)      HLD (hyperlipidemia)      Lung nodules      Periungual wart      PTSD (post-traumatic stress  disorder)      Sleep apnea     doesn't use CPAP       Past Surgical History  Past Surgical History:   Procedure Laterality Date     C NONSPECIFIC PROCEDURE      right forearm fracture     COLONOSCOPY Left 1/22/2016    Procedure: COMBINED COLONOSCOPY, SINGLE OR MULTIPLE BIOPSY/POLYPECTOMY BY BIOPSY;  Surgeon: Clark Saini MD;  [facility]     HC EXPLORE UNDESC TESTIS,INGUIN/SCROTAL       LAPAROSCOPIC APPENDECTOMY N/A 1/31/2018    Procedure: LAPAROSCOPIC APPENDECTOMY;  LAPAROSCOPIC APPENDECTOMY;  Surgeon: Dawn Holt MD;  [facility]     LAPAROSCOPY DIAGNOSTIC (GENERAL) N/A 7/26/2016    Procedure: LAPAROSCOPY DIAGNOSTIC (GENERAL);  Surgeon: Susannah Arriaga MD;  [facility]     LAPAROSCOPY DIAGNOSTIC (GENERAL) N/A 4/16/2018    Procedure: LAPAROSCOPY DIAGNOSTIC (GENERAL);  Diagnostic laparoscopy and lysis of adhesions;  Surgeon: Prince Dowling MD;  [facility]     OPTICAL TRACKING SYSTEM CRANIOTOMY, EXCISE TUMOR, COMBINED Left 4/10/2015    Procedure: COMBINED OPTICAL TRACKING SYSTEM CRANIOTOMY, EXCISE TUMOR;  Surgeon: Mirlande Colmenares MD;  [facility]     REPAIR GAMEKEEPER'S THUMB Right 12/2/2016    Procedure: REPAIR LIGAMENT ULNAR COLLATERAL THUMB (GAMEKEEPER'S);  Surgeon: Evin Zamorano MD;  [facility]       Hx of Blood transfusions/reactions: denies     Hx of abnormal bleeding or anti-platelet use: none    Menstrual history: No LMP for male patient.:     Steroid use in the last year: none    Personal or FH with difficulty with Anesthesia:  denies    Prior to Admission Medications  Current Outpatient Medications   Medication Sig Dispense Refill     bictegravir-emtricitabine-tenofovir (BIKTARVY) -25 MG per tablet Take 1 tablet by mouth daily (Patient taking differently: Take 1 tablet by mouth every morning ) 30 tablet 11     docusate sodium (COLACE) 50 MG capsule Take 2 capsules (100 mg) by mouth 2 times daily as needed for constipation 60  capsule 5     famotidine (PEPCID) 20 MG tablet Take 1 tablet (20 mg) by mouth 2 times daily 30 tablet 11     insulin glargine (BASAGLAR KWIKPEN) 100 UNIT/ML pen INJECT 30 UNITS SUBCUTANEOUSLY TWICE DAILY *DISCARD PEN 28 DAYS AFTER FIRST USE, DO NOT REFRIGERATE AFTER FIRST USE* (Patient taking differently: Inject 30 Units Subcutaneous 2 times daily ) 15 mL 15     NOVOLOG FLEXPEN 100 UNIT/ML soln Inject 10 units with breakfast, 15 units with lunch, and 20 units with dinner, along with sliding scale 1 unit per 25mg/dL over 140 before meals and over 200 at bedtime. Max daily dose 80 units (Patient taking differently: Inject 10 units with breakfast, 15 units with lunch, and 20 units with dinner, along with sliding scale 1 unit per 25mg/dL over 140 before meals and over 200 at bedtime. Max daily dose 80 units) 75 mL 1     Nutritional Supplements (GLUCOSE MANAGEMENT) TABS Use 1-2 tabs for hypoglycemia. 30 tablet 3     polyethylene glycol (MIRALAX) powder Take 17 g (1 capful) by mouth daily as needed for constipation 289 g 0     psyllium (METAMUCIL SMOOTH TEXTURE) 28 % packet Use one packet in 8oz water daily. 30 each 0     SENNA-docusate sodium (SENNA S) 8.6-50 MG tablet Take 1 tablet by mouth 2 times daily Hold for loose stools. 60 tablet 5     sennosides (SENOKOT) 8.6 MG tablet Take 1 tablet by mouth 2 times daily as needed for constipation 60 tablet 5     blood glucose (NO BRAND SPECIFIED) lancets standard Use to test blood sugar four times daily or as directed. 100 each 0     blood glucose (NO BRAND SPECIFIED) test strip 1 strip by In Vitro route 4 times daily (before meals and nightly) Use to test blood sugars 4 times daily or as directed 100 strip 11     blood glucose monitoring (NO BRAND SPECIFIED) meter device kit Use to test blood sugar 4 times daily or as directed. 1 kit 0     insulin pen needle (B-D U/F) 31G X 8 MM miscellaneous Use 1 pen needles daily or as directed. 100 each 3     insulin pen needle (BD PEN  NEEDLE SCOTT 2ND GEN) 32G X 4 MM miscellaneous Use 3-4 daily. 250 each 3     STATIN NOT PRESCRIBED (INTENTIONAL) Please choose reason not prescribed, below         Allergies  Allergies   Allergen Reactions     Metformin      Abdominal pain     Milk [Lac Bovis] Hives     Dulaglutide Rash       Social History  Social History     Socioeconomic History     Marital status: Single     Spouse name: Not on file     Number of children: Not on file     Years of education: Not on file     Highest education level: Not on file   Occupational History     Occupation:      Comment: 5 years; temp agency     Occupation: Unemployed   Social Needs     Financial resource strain: Not on file     Food insecurity     Worry: Not on file     Inability: Not on file     Transportation needs     Medical: Not on file     Non-medical: Not on file   Tobacco Use     Smoking status: Current Some Day Smoker     Packs/day: 0.50     Years: 40.00     Pack years: 20.00     Types: Cigarettes     Smokeless tobacco: Former User   Substance and Sexual Activity     Alcohol use: No     Alcohol/week: 0.0 standard drinks     Comment: Last etoh in 2007     Drug use: Not Currently     Types: Marijuana, Methamphetamines     Comment: Sober 9 months      Sexual activity: Not Currently     Partners: Female, Male     Comment: Last sexual activity 2008   Lifestyle     Physical activity     Days per week: Not on file     Minutes per session: Not on file     Stress: Not on file   Relationships     Social connections     Talks on phone: Not on file     Gets together: Not on file     Attends Orthodox service: Not on file     Active member of club or organization: Not on file     Attends meetings of clubs or organizations: Not on file     Relationship status: Not on file     Intimate partner violence     Fear of current or ex partner: Not on file     Emotionally abused: Not on file     Physically abused: Not on file     Forced sexual activity: Not on file    Other Topics Concern     Parent/sibling w/ CABG, MI or angioplasty before 65F 55M? Not Asked   Social History Narrative    Born in [facility].  Came to the [place] in 1993.  Last traveled to visit family in 2008.        1/7/2019 - Homeless. Working with a  at [employer] trying to get housing. Sexually active with two partners recently using condoms 100% of the time. Smokes occasionally, not for the last 4 weeks.        1/7/2020 at [facility] since 1/1/2020. 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  Care established with ID and endocrine       Family History  Family History   Problem Relation Age of Onset     Diabetes Brother      Diabetes Father      Alzheimer Disease Father      Unknown/Adopted Mother      Diabetes Paternal Grandfather      Cancer No family hx of         no skin cancer     Skin Cancer No family hx of         no famiy hx of skin cancer     Glaucoma No family hx of      Macular Degeneration No family hx of        Review of Systems    ROS/MED HX    ENT/Pulmonary:     (+)sleep apnea (Has ordered one but hasn't been able to get due to COVID), tobacco use (so far 20 pack history), Current use 0.5 packs/day  doesn't use CPAP , . .    Neurologic: Comment: H/o toxoplasmosis CNS s/p surgical resection, 2015    (+)seizures last seizure: October 2019 features: secondary to diabetes: Blood glucose 751,    (-) CVA and TIA   Cardiovascular:     (+) Dyslipidemia, ----. : . . . :. . Previous cardiac testing date:results:date: results:ECG reviewed date:8/23/20 results:Normal sinus rhythm date: results:          METS/Exercise Tolerance:  >4 METS   Hematologic:  - neg hematologic  ROS      (-) history of blood clots, anemia and History of Transfusion   Musculoskeletal:   (+) fracture upper extremity (Fracture at [age] years old s/p hardware. ): Radius,  -       GI/Hepatic:     (+) GERD Asymptomatic on medication, appendicitis (s/p appendectomy), Other GI/Hepatic history of recurrent SBO, chronic abdominal apin      "  Renal/Genitourinary: Comment: Penile implant, 2015        Endo:     (+) type II DM (9.0) Last HgA1c: 11.6 date: 2/2020 Using insulin - not using insulin pump Normal glucose range: BG in morning 150's Previously admitted for DM/DKA .      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     (+) psychiatric history other (comment) (PTSD)      Infectious Disease:   (+) HIV,       Malignancy:      - no malignancy   Other:    (+) C-spine cleared: Yes, H/O Chronic Pain,no other significant disability        The complete review of systems is negative other than noted in the HPI or here.   Temp: 98.3  F (36.8  C) Temp src: Oral BP: 128/81 Pulse: 100   Resp: 24 SpO2: 96 %         199 lbs 1.59 oz  5' 4\"[pt reported[   Body mass index is 34.18 kg/m .       Physical Exam  Constitutional: Awake, alert, cooperative, no apparent distress, and appears stated age.  Eyes: Pupils equal, round and reactive to light, extra ocular muscles intact, sclera clear, conjunctiva normal.  HENT: Normocephalic, oral pharynx with moist mucus membranes, fair dentition - some chipped and missing teeth. No goiter appreciated.   Respiratory: Clear to auscultation bilaterally, no crackles or wheezing.  Cardiovascular: Regular rate and rhythm, normal S1 and S2, and no murmur noted.  Carotids +2, no bruits. No edema. Palpable pulses to radial  DP and PT arteries.   GI: Normal bowel sounds, soft, non-distended, non-tender, obese  Lymph/Hematologic: No cervical lymphadenopathy and no supraclavicular lymphadenopathy.  Genitourinary:  defer  Skin: Warm and dry.  No rashes at anticipated surgical site.   Musculoskeletal: Sightly limited ROM of neck. There is no redness, warmth, or swelling of the joints. Gross motor strength is normal.    Neurologic: Awake, alert, oriented to name, place and time. Cranial nerves II-XII are grossly intact. Gait is normal.   Neuropsychiatric: Calm, cooperative. Normal affect.     Labs: " (personally reviewed)    LABS:  CBC:   Lab Results   Component Value Date    WBC 10.3 08/14/2020    WBC 7.1 07/30/2020    HGB 16.0 08/14/2020    HGB 14.3 07/30/2020    HCT 47.2 08/14/2020    HCT 41.4 07/30/2020     08/14/2020     07/30/2020     BMP:   Lab Results   Component Value Date     08/14/2020     07/30/2020    POTASSIUM 3.3 (L) 08/14/2020    POTASSIUM 3.4 07/30/2020    CHLORIDE 104 08/14/2020    CHLORIDE 108 07/30/2020    CO2 27 08/14/2020    CO2 27 07/30/2020    BUN 12 08/14/2020    BUN 15 07/30/2020    CR 0.96 08/14/2020    CR 0.80 07/30/2020    GLC 79 08/14/2020    GLC 94 07/30/2020     COAGS:   Lab Results   Component Value Date    PTT 23 03/07/2020    INR 1.12 03/07/2020     POC:   Lab Results   Component Value Date     (H) 08/14/2020     OTHER:   Lab Results   Component Value Date    PH 7.42 04/10/2015    LACT 1.2 04/27/2020    A1C 11.6 (H) 02/11/2020    LINDA 9.6 08/14/2020    PHOS 3.0 02/07/2020    MAG 1.8 04/27/2020    ALBUMIN 4.0 08/14/2020    PROTTOTAL 8.2 08/14/2020    ALT 27 08/14/2020    AST 31 08/14/2020    ALKPHOS 102 08/14/2020    BILITOTAL 1.4 (H) 08/14/2020    LIPASE 104 07/30/2020    AMYLASE 75 01/24/2018    TSH 2.66 02/11/2020    T4 1.02 04/14/2015    CRP 7.9 02/26/2020    SED 21 (H) 02/25/2020      Results for ALISON ROBERT (MRN [id number]) as of 8/27/2020 14:46   Ref. Range 8/6/2020 08:42   HIV-1 RNA Quant Result Latest Ref Range: HIVND^HIV-1 RNA Not Detected Copies/mL HIV-1 RNA Not Detected   HIV RNA QT Log Latest Ref Range: <1.3 Log_copies/mL Not Calculated   Absolute CD3 Latest Ref Range: 603 - 2,990 cells/uL 1,143   Absolute CD4 Latest Ref Range: 441 - 2,156 cells/uL 362 (L)   Absolute CD8 Latest Ref Range: 125 - 1,312 cells/uL 730   CD3 Mature T Latest Ref Range: 49 - 84 % 56   CD4:CD8 Ratio Latest Ref Range: 1.40 - 2.60  0.50 (L)   CD4 [place] T Latest Ref Range: 28 - 63 % 18 (L)   CD8 Suppressor T Latest Ref Range: 10 - 40 % 36   IFC Specimen  Unknown Blood   Results for ANDREW ROSAS (MRN [id number]) as of 2020 15:54   Ref. Range 2020 14:51   Potassium Latest Ref Range: 3.4 - 5.3 mmol/L 3.5   Hemoglobin A1C Latest Ref Range: 0 - 5.6 % 8.2 (H)       EK20  Normal sinus rhythm    The patient's records and results personally reviewed by this provider.     Outside records reviewed from: care everywhere     ASSESSMENT and PLAN  Andrew is a 52 year old man who is scheduled for anoscopy on 20 by Dr. Lundberg in treatment of anal dysplasia.  PAC referral for risk assessment and optimization for anesthesia with comorbid conditions of HLD, DANISHA, smoking, history of CNS toxoplasmosis, seizures, type 2 diabetes, GERD, chronic abdominal pain, history of SBO, HIV/AIDs, thigh cellulitis, PTSD and history of substance abuse:    Pre-operative considerations:  1.  Cardiac:  Functional status- METS >4, the patient is biking 4 miles a day.  Low risk surgery with 0.9% (RCRI #) risk of major adverse cardiac event. He denies any cardiac symptom. He had recent testing on 20. No further testing indicated.   ~ HLD - the patient is not currently on medications    2.  Pulm:  Airway feasible.  DANISHA- the patient does not use CPAP. Consideration for close monitoring of airway and patient has untreated DANISHA.   ~ Smoker - 0.5 ppd, so far 20 year pack history. We discussed smoking cessation which the patient will try to do a couple of days prior to surgery but know to for sure not do the DOS.     3. Neuro: History of CNS toxoplasmosis - s/p surgical resection in . He denies any residual affects.    ~ Seizures - last on 10/2019 2/2 diabetes with blood sugar in the 700's. No seizures since.     4. Endo: Uncontrolled type 2 diabetes - Last A1c was 11.6 on 20. Recheck today. The patient will hold short acting insulin and take 80% of long acting insulin.   ~ Obesity - BMI 34 - consideration for safe lifting techniques.     5. GI:  Risk of PONV score = 1.  If > 2,  anti-emetic intervention recommended.  ~ GERD - continue Pepcid.   ~ Chronic abdominal pain/history of SBO - hold bowel regimen. The patient denies any current symptoms.   ~ Anal dysplasia - procedure as above.     6. ID: HIV/AIDS - the patient is followed by Dr. Prieto and last seen on 8/7/20. HIV RNA not detected, CD4 362 Continue Biktarvy.      7. Psych: PTSD and history of substance abuse with marijuana and meth - the patient is currently living in [facility] and 9 months sober from methamphetamines and sober from marijuana and alcohol.       VTE risk: 0.5%    The patient is optimized for their procedure. AVS with information on surgery time/arrival time, meds and NPO status given by nursing staff.        Anna Buchanan PA-C  Preoperative Assessment Center  [facility]  Phone: [phone]  Fax: [phone]

## 2020-08-27 NOTE — ANESTHESIA PREPROCEDURE EVALUATION
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]   Procedure(s):  ANOSCOPY     LABS:  CBC:   Lab Results   Component Value Date    WBC 10.3 2020    WBC 7.1 2020    HGB 16.0 2020    HGB 14.3 2020    HCT 47.2 2020    HCT 41.4 2020     2020     2020     BMP:   Lab Results   Component Value Date     2020     2020    POTASSIUM 3.3 (L) 2020    POTASSIUM 3.4 2020    CHLORIDE 104 2020    CHLORIDE 108 2020    CO2 27 2020    CO2 27 2020    BUN 12 2020    BUN 15 2020    CR 0.96 2020    CR 0.80 2020    GLC 79 2020    GLC 94 2020     COAGS:   Lab Results   Component Value Date    PTT 23 2020    INR 1.12 2020     POC:   Lab Results   Component Value Date     (H) 2020     OTHER:   Lab Results   Component Value Date    PH 7.42 04/10/2015    LACT 1.2 2020    A1C 11.6 (H) 2020    LINDA 9.6 2020    PHOS 3.0 2020    MAG 1.8 2020    ALBUMIN 4.0 2020    PROTTOTAL 8.2 2020    ALT 27 2020    AST 31 2020    ALKPHOS 102 2020    BILITOTAL 1.4 (H) 2020    LIPASE 104 2020    AMYLASE 75 2018    TSH 2.66 2020    T4 1.02 2015    CRP 7.9 2020    SED 21 (H) 2020      Results for ANDREW LOCKWOOD (MRN [id number]) as of 2020 15:54   Ref. Range 2020 14:51   Potassium Latest Ref Range: 3.4 - 5.3 mmol/L 3.5   Hemoglobin A1C Latest Ref Range: 0 - 5.6 % 8.2 (H)       Preop Vitals    BP Readings from Last 3 Encounters:   20 128/81   20 128/81   20 (!) 131/93    Pulse Readings from Last 3 Encounters:   20 100   20 100   20 99      Resp Readings from Last 3 Encounters:   20 24   20  "16   08/14/20 16    SpO2 Readings from Last 3 Encounters:   08/27/20 96%   08/27/20 97%   08/23/20 98%      Temp Readings from Last 1 Encounters:   08/27/20 98.3  F (36.8  C) (Oral)    Ht Readings from Last 1 Encounters:   08/27/20 1.626 m (5' 4\")      Wt Readings from Last 1 Encounters:   08/27/20 90.3 kg (199 lb 1.6 oz)    Estimated body mass index is 34.18 kg/m  as calculated from the following:    Height as of this encounter: 1.626 m (5' 4\").    Weight as of this encounter: 90.3 kg (199 lb 1.6 oz).     LDA:        Past Medical History:   Diagnosis Date     AIDS (H)      Allergic rhinitis due to other allergen     DNS     Anal dysplasia      Chronic abdominal pain      CNS toxoplasmosis (H)      Diabetes type 2, controlled (H)      GERD (gastroesophageal reflux disease)      HIV (human immunodeficiency virus infection) (H)      Periungual wart      Sleep apnea     doesn't use CPAP      Past Surgical History:   Procedure Laterality Date     C NONSPECIFIC PROCEDURE      right forearm fracture     COLONOSCOPY Left 1/22/2016    Procedure: COMBINED COLONOSCOPY, SINGLE OR MULTIPLE BIOPSY/POLYPECTOMY BY BIOPSY;  Surgeon: Clark Saini MD;  Location:  GI     HC EXPLORE UNDESC TESTIS,INGUIN/SCROTAL       LAPAROSCOPIC APPENDECTOMY N/A 1/31/2018    Procedure: LAPAROSCOPIC APPENDECTOMY;  LAPAROSCOPIC APPENDECTOMY;  Surgeon: Dawn Holt MD;  Location: [facility] OR     LAPAROSCOPY DIAGNOSTIC (GENERAL) N/A 7/26/2016    Procedure: LAPAROSCOPY DIAGNOSTIC (GENERAL);  Surgeon: Susannah Arriaga MD;  Location: [facility] OR     LAPAROSCOPY DIAGNOSTIC (GENERAL) N/A 4/16/2018    Procedure: LAPAROSCOPY DIAGNOSTIC (GENERAL);  Diagnostic laparoscopy and lysis of adhesions;  Surgeon: Prince Dowling MD;  Location:  OR     OPTICAL TRACKING SYSTEM CRANIOTOMY, EXCISE TUMOR, COMBINED Left 4/10/2015    Procedure: COMBINED OPTICAL TRACKING SYSTEM CRANIOTOMY, EXCISE TUMOR;  Surgeon: Mirlande Colmenares MD;  Location:  OR "     REPAIR GAMEKEEPER'S THUMB Right 12/2/2016    Procedure: REPAIR LIGAMENT ULNAR COLLATERAL THUMB (GAMEKEEPER'S);  Surgeon: Evin Zamorano MD;  Location: [facility] OR      Allergies   Allergen Reactions     Metformin      Abdominal pain     Milk [Lac Bovis] Hives     Dulaglutide Rash        Anesthesia Evaluation     . Pt has had prior anesthetic. Type: General and MAC    No history of anesthetic complications          ROS/MED HX    ENT/Pulmonary:     (+)sleep apnea (Has ordered one but hasn't been able to get due to COVID), tobacco use (so far 20 pack history), Current use 0.5 packs/day  doesn't use CPAP , . Other pulmonary disease stable lung nodules.    Neurologic: Comment: H/o toxoplasmosis CNS s/p surgical resection, 2015    (+)seizures last seizure: October 2019 features: secondary to diabetes: Blood glucose 751,    (-) CVA and TIA   Cardiovascular:     (+) Dyslipidemia, ----. : . . . :. . Previous cardiac testing date:results:date: results:ECG reviewed date:8/23/20 results:Normal sinus rhythm date: results:          METS/Exercise Tolerance:  >4 METS   Hematologic:  - neg hematologic  ROS      (-) history of blood clots, anemia and History of Transfusion   Musculoskeletal:   (+) fracture upper extremity (Fracture at [age] years old s/p hardware. ): Radius,  -       GI/Hepatic:     (+) GERD Asymptomatic on medication, appendicitis (s/p appendectomy), Other GI/Hepatic history of recurrent SBO, chronic abdominal apin       Renal/Genitourinary: Comment: Penile implant, 2015        Endo:     (+) type II DM (9.0) Last HgA1c: 8.2 date: 8/27/20 Using insulin - not using insulin pump Normal glucose range: BG in morning 150's Previously admitted for DM/DKA Obesity, .      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     (+) psychiatric history other (comment) (PTSD)      Infectious Disease:   (+) HIV,       Malignancy:      - no malignancy   Other:    (+) C-spine cleared: Yes, H/O  Chronic Pain,no other significant disability                        PHYSICAL EXAM:   Mental Status/Neuro: A/A/O; Age Appropriate   Airway: Facies: Feasible  Mallampati: II  Mouth/Opening: Full  TM distance: > 6 cm  Neck ROM: Limited   Respiratory: Auscultation: CTAB     Resp. Rate: Normal     Resp. Effort: Normal      CV: Rhythm: Regular  Heart: Normal Sounds  Edema: None  Pulses: Normal  Neck: Normal   Comments: Chipped teeth and missing teeth   Reports 3x loose molars (upper left, lower left, lower right)                     Assessment:   ASA SCORE: 3       NPO Status: NPO Appropriate     Plan:   Anes. Type:  MAC   Pre-Medication: None   Induction:  IV (Standard)   Airway: Native Airway (nasal cannula)   Access/Monitoring: PIV   Maintenance: Propofol Sedation     Postop Plan:   Postop Pain: Opioids  Postop Sedation/Airway: Not planned  Disposition: Outpatient     PONV Management:   Adult Risk Factors:, Postop Opioids   Prevention:, Propofol     CONSENT: Direct conversation   Plan and risks discussed with: Patient   Blood Products: Consent Deferred (Minimal Blood Loss)       Comments for Plan/Consent:  MAC with GA backup              PAC Discussion and Assessment    ASA Classification: 3  Case is suitable for: [place]  Anesthetic techniques and relevant risks discussed: MAC with GA as backup  Invasive monitoring and risk discussed:   Types:   Possibility and Risk of blood transfusion discussed:   NPO instructions given:   Additional anesthetic preparation and risks discussed:   Needs early admission to pre-op area:   Other:     PAC Resident/NP Anesthesia Assessment:  Andrew is a 52 year old man who is scheduled for anoscopy on 9/9/20 by Dr. Lundberg in treatment of anal dysplasia.  PAC referral for risk assessment and optimization for anesthesia with comorbid conditions of HLD, DANISHA, smoking, history of CNS toxoplasmosis, seizures, type 2 diabetes, GERD, chronic abdominal pain, history of SBO, HIV/AIDs, thigh  cellulitis, PTSD and history of substance abuse:    Pre-operative considerations:  1.  Cardiac:  Functional status- METS >4, the patient is biking 4 miles a day.  Low risk surgery with 0.9% (RCRI #) risk of major adverse cardiac event. He denies any cardiac symptom. He had recent testing on 8/23/20. No further testing indicated.   ~ HLD - the patient is not currently on medications    2.  Pulm:  Airway feasible.  DANISHA- the patient does not use CPAP. Consideration for close monitoring of airway and patient has untreated DANISHA.   ~ Smoker - 0.5 ppd, so far 20 year pack history. We discussed smoking cessation which the patient will try to do a couple of days prior to surgery but know to for sure not do the DOS.   ~ Lung nodules - patient just had follow up CT scan and these are stable.     3. Neuro: History of CNS toxoplasmosis - s/p surgical resection in 2015. He denies any residual affects.    ~ Seizures - last on 10/2019 2/2 diabetes with blood sugar in the 700's. No seizures since.     4. Endo: Uncontrolled type 2 diabetes - Last A1c was 11.6 on 2/11/20. Recheck today. The patient will hold short acting insulin and take 80% of long acting insulin.   ~ Obesity - BMI 34 - consideration for safe lifting techniques.     5. GI:  Risk of PONV score = 1.  If > 2, anti-emetic intervention recommended.  ~ GERD - continue Pepcid.   ~ Chronic abdominal pain/history of SBO - hold bowel regimen. The patient denies any current symptoms.   ~ Anal dysplasia - procedure as above.     6. ID: HIV/AIDS - the patient is followed by Dr. Prieto and last seen on 8/7/20. HIV RNA not detected, CD4 362. Continue Biktarvy.      7. Psych: PTSD and history of substance abuse with marijuana and meth - the patient is currently living in [facility] and 9 months sober from methamphetamines and sober from marijuana and alcohol.       VTE risk: 0.5%    Patient is optimized and is acceptable candidate for the proposed procedure.  No further diagnostic  evaluation is needed.     For further details of assessment, testing, and physical exam please see H and P completed on same date.    Anna Buchanan PA-C        Mid-Level Provider/Resident: Anna Buchanan PA-C  Date: 8/27/20  Time:     Attending Anesthesiologist Anesthesia Assessment:        Anesthesiologist:   Date:   Time:   Pass/Fail:   Disposition:     PAC Pharmacist Assessment:        Pharmacist:   Date:   Time:    Anna Buchanan PA-C

## 2020-08-27 NOTE — PATIENT INSTRUCTIONS
[facility] Medication Refill Request Information:  * Please contact your pharmacy regarding ANY request for medication refills.  ** [facility] Prescription Fax = [phone]  * Please allow 3 business days for routine medication refills.  * Please allow 5 business days for controlled substance medication refills.     [facility] Test Result notification information:  *You will be notified with in 7-10 days of your appointment day regarding the results of your test.  If you are on MyChart you will be notified as soon as the provider has reviewed the results and signed off on them.    [facility]: [phone] \    Set up CT for January: Radiology:  [phone] [facility]  [phone] [facility]  [phone] [facility]

## 2020-08-27 NOTE — PATIENT INSTRUCTIONS
Preparing for Your Surgery      Name:  Andrew Lockwood   MRN:  [id number]   :  1967   Today's Date:  2020       Arriving for surgery:  Surgery date: 2020   Arrival time:  730 am    Restrictions due to COVID 19:  Patients are allowed one visitor in the pre-op period  All visitors must wear a mask  No visitors under 18  No ill visitors   parking is not available     Please come to:       [facility] [address]  [address] [place]  [address]       -    Please proceed to the Surgery Lounge on the 3rd floor. [phone]?     - ?If you are in need of directions, wheelchair or escort please stop at the Information Desk in the lobby.       What can I eat or drink?  -  You may eat and drink normally for up to 8 hours before your surgery. (Until (midnight)  -  You may have clear liquids until 2 hours before surgery. (Until 7:30 am arrival time )  Examples of clear liquids:  Water  Clear broth  Juices (apple, white grape, white cranberry  and cider) without pulp  Noncarbonated, powder based beverages  (lemonade and Nakul-Aid)  Sodas (Sprite, 7-Up, ginger ale and seltzer)  Coffee or tea (without milk or cream)  Gatorade    -  No Alcohol for at least 24 hours before surgery     Which medicines can I take?    Hold Aspirin for 7 days before surgery.   Hold Multivitamins for 7 days before surgery.  Hold Supplements for 7 days before surgery.  Hold Ibuprofen (Advil, Motrin) for 1 day before surgery--unless otherwise directed by surgeon.  Hold Naproxen (Aleve) for 4 days before surgery.    -  DO NOT take these medications the day of surgery:  Novolog insulin (short acting insulin)  Polyethylene glycol (miralax)  Psyllium (metamucil)      -  PLEASE TAKE these medications the day of surgery:  80% of usual am Insulin glargine (basaglar)---24 units   Biktarvy (bictegravir emticitabine)  Docusate if needed  Famotidine (pepcid)      How do I prepare myself?  - Please take 2  showers before surgery using Scrubcare or Hibiclens soap.    Use this soap only from the neck to your toes.     Leave the soap on your skin for one minute--then rinse thoroughly.      You may use your own shampoo and conditioner; no other hair products.   - Please remove all jewelry and body piercings.  - No lotions, deodorants or fragrance.  - No makeup or fingernail polish.   -Bring your ID and insurance card        THE DAY OF SURGERY     Bowel Preparation: FLEET ENEMA INSTRUCTIONS     2 hours prior to your arrival time for procedure:     1. Remove orange protective shield from enema Comfortip before inserting.     2. With steady pressure, gently insert enema tip into rectum with a slight side-to-side movement, with tip pointing toward the navel.  Insertion may be easier if you bear down, as if you are having a bowel movement.     3. Do not force the enema tip into rectum, as this can cause injury.     4. squeeze bottle until nearly all liquid is gone.  It is not neccessary to empty the bottle      5. Remove Comforttip from rectum, and maintain position until you feel the urge to evacuate is strong (usually 2-5 minutes).          Repeat the same steps for the second enema 30 minutes after completing the first enema.           - All patients are required to have a Covid-19 test within 4 days of surgery/procedure.      -Patients will be contacted by the [facility] scheduling team within 1 week of surgery to make an appointment.      - Patients may call the Scheduling team at [phone] if they have not been scheduled within 4 days of  surgery.      ALL PATIENTS GOING HOME THE SAME DAY OF SURGERY ARE REQUIRED TO HAVE A RESPONSIBLE ADULT TO DRIVE AND BE IN ATTENDANCE WITH THEM FOR 24 HOURS FOLLOWING SURGERY     Questions or Concerns:    - For any questions regarding the day of surgery or your hospital stay, please contact the Pre Admission Nursing Office at [phone].             For any questions or  concerns, please contact our care coordinators- Toyin RN: [phone], TIM JamilN: [phone], or Elli EMT: [phone].

## 2020-09-04 ENCOUNTER — NURSE TRIAGE (OUTPATIENT)
Dept: NURSING | Facility: CLINIC | Age: 55
End: 2020-09-04

## 2020-09-04 NOTE — TELEPHONE ENCOUNTER
Patient with history of non-compliance with nutrition and insulin.  from facility called due to the fact that BS was 385 at 4:18 PM and they are to call. Patient had stated he felt slightly drunk or dizzy but was alert and oriented and asking to go for a bike ride. He had taken the morning/breakfast dose if insulin but not the lunch dose and only had Hot Chocolate for lunch. When he had the high blood sugar he took 29 Units of Novolog and took his BS again 30 minutes later and BS was 422. He has no fever, no nausea or vomiting.  Care attendant was instructed to take BS again in a na hour and if not better or increased to call the Resident on Call - number and instructions given.  She was instructed to watch for confusion, drowsiness, increased thirst or urination - these symptoms would warrent a trip to the ER.    So she will call [facility] if condition changes, BS is still high or going up and needing guidance for next insulin dosing for the evening.      Kathleen M Doege RN

## 2020-09-04 NOTE — TELEPHONE ENCOUNTER
"    Additional Information    Negative: Unconscious or difficult to awaken    Negative: Acting confused (e.g., disoriented, slurred speech)    Negative: Very weak (can't stand)    Negative: Sounds like a life-threatening emergency to the triager    Negative: Vomiting and signs of dehydration (e.g., very dry mouth, lightheaded, dark urine)    Negative: Blood glucose > 240 mg/dL (13.3 mmol/L) and rapid breathing    Negative: Blood glucose > 500 mg/dL (27.8 mmol/L)    Negative: Blood glucose > 240 mg/dL (13.3 mmol/L) AND urine ketones moderate-large (or more than 1+)    Negative: Blood glucose > 240 mg/dL (13.3 mmol/L) and blood ketones > 1.4 mmol/L    Negative: Blood glucose > 240 mg/dL (13.3 mmol/L) AND vomiting AND unable to check for ketones (in blood or urine)    Negative: Vomiting lasting > 4 hours    Negative: Patient sounds very sick or weak to the triager    Negative: Fever > 100.5 F (38.1 C)    Negative: Caller has URGENT medication or insulin pump question and triager unable to answer question    Negative: Blood glucose > 400 mg/dL (22.2 mmol/L)    Blood glucose > 300 mg/dL (16.7 mmol/L) AND two or more times in a row    Negative: Urine ketones moderate - large (or blood ketones > 1.4 mmol/L)    Blood glucose 240 - 300 mg/dL (13.3 - 16.7 mmol/L)    Answer Assessment - Initial Assessment Questions  1. BLOOD GLUCOSE: \"What is your blood glucose level?\"       385  2. ONSET: \"When did you check the blood glucose?\"      4:15 (20 minutes ago)  3. USUAL RANGE: \"What is your glucose level usually?\" (e.g., usual fasting morning value, usual evening value)      Very variable  4. KETONES: \"Do you check for ketones (urine or blood test strips)?\" If yes, ask: \"What does the test show now?\"       na  5. TYPE 1 or 2:  \"Do you know what type of diabetes you have?\"  (e.g., Type 1, Type 2, Gestational; doesn't know)       didn't know - Type 2?  6. INSULIN: \"Do you take insulin?\" \"What type of insulin(s) do you use? What is " "the mode of delivery? (syringe, pen (e.g., injection or  pump)?\"       Injections scheduled and coverage  7. DIABETES PILLS: \"Do you take any pills for your diabetes?\" If yes, ask: \"Have you missed taking any pills recently?\"      na  8. OTHER SYMPTOMS: \"Do you have any symptoms?\" (e.g., fever, frequent urination, difficulty breathing, dizziness, weakness, vomiting)      Felt drunk. dizzy  9. PREGNANCY: \"Is there any chance you are pregnant?\" \"When was your last menstrual period?\"      na    Protocols used: DIABETES - HIGH BLOOD SUGAR-A-OH      "

## 2020-09-08 DIAGNOSIS — K62.82 ANAL DYSPLASIA: ICD-10-CM

## 2020-09-08 LAB
LABORATORY COMMENT REPORT: NORMAL
SARS-COV-2 RNA SPEC QL NAA+PROBE: NEGATIVE
SARS-COV-2 RNA SPEC QL NAA+PROBE: NORMAL
SPECIMEN SOURCE: NORMAL
SPECIMEN SOURCE: NORMAL

## 2020-09-08 NOTE — TELEPHONE ENCOUNTER
Attempted to reach  Her residence but left a message to call or fax BS  Information with MAR to [phone] or to call us. I have not rceived any information yet today . Aleah Hampton RN on 9/8/2020 at 1:39 PM

## 2020-09-08 NOTE — OR NURSING
LM for [facility] nurse that pt needs covid test and to call back to . LM for pt with time change, location, NPO, and to call Rianna Wagner  to schedule Covid test today. Also left my CB # [phone]

## 2020-09-09 ENCOUNTER — ANESTHESIA (OUTPATIENT)
Dept: SURGERY | Facility: CLINIC | Age: 55
End: 2020-09-09
Payer: COMMERCIAL

## 2020-09-09 ENCOUNTER — TELEPHONE (OUTPATIENT)
Dept: ENDOCRINOLOGY | Facility: CLINIC | Age: 55
End: 2020-09-09

## 2020-09-09 ENCOUNTER — TELEPHONE (OUTPATIENT)
Dept: INFECTIOUS DISEASES | Facility: CLINIC | Age: 55
End: 2020-09-09

## 2020-09-09 ENCOUNTER — HOSPITAL ENCOUNTER (OUTPATIENT)
Facility: CLINIC | Age: 55
Discharge: HOME | End: 2020-09-09
Attending: COLON & RECTAL SURGERY | Admitting: COLON & RECTAL SURGERY
Payer: COMMERCIAL

## 2020-09-09 VITALS
HEART RATE: 87 BPM | WEIGHT: 215.83 LBS | HEIGHT: 64 IN | TEMPERATURE: 98 F | BODY MASS INDEX: 36.85 KG/M2 | SYSTOLIC BLOOD PRESSURE: 118 MMHG | RESPIRATION RATE: 18 BRPM | OXYGEN SATURATION: 99 % | DIASTOLIC BLOOD PRESSURE: 81 MMHG

## 2020-09-09 DIAGNOSIS — K62.82 ANAL DYSPLASIA: Primary | ICD-10-CM

## 2020-09-09 LAB
CREAT SERPL-MCNC: 0.87 MG/DL (ref 0.66–1.25)
GFR SERPL CREATININE-BSD FRML MDRD: >90 ML/MIN/{1.73_M2}
GLUCOSE BLDC GLUCOMTR-MCNC: 202 MG/DL (ref 70–99)
GLUCOSE BLDC GLUCOMTR-MCNC: 243 MG/DL (ref 70–99)
POTASSIUM SERPL-SCNC: 3.7 MMOL/L (ref 3.4–5.3)

## 2020-09-09 PROCEDURE — 40000170 ZZH STATISTIC PRE-PROCEDURE ASSESSMENT II: Performed by: COLON & RECTAL SURGERY

## 2020-09-09 PROCEDURE — 37000008 ZZH ANESTHESIA TECHNICAL FEE, 1ST 30 MIN: Performed by: COLON & RECTAL SURGERY

## 2020-09-09 PROCEDURE — 25000125 ZZHC RX 250: Performed by: COLON & RECTAL SURGERY

## 2020-09-09 PROCEDURE — 88305 TISSUE EXAM BY PATHOLOGIST: CPT | Performed by: COLON & RECTAL SURGERY

## 2020-09-09 PROCEDURE — 84132 ASSAY OF SERUM POTASSIUM: CPT | Performed by: ANESTHESIOLOGY

## 2020-09-09 PROCEDURE — 82962 GLUCOSE BLOOD TEST: CPT

## 2020-09-09 PROCEDURE — 25800030 ZZH RX IP 258 OP 636: Performed by: ANESTHESIOLOGY

## 2020-09-09 PROCEDURE — 25000128 H RX IP 250 OP 636: Performed by: NURSE ANESTHETIST, CERTIFIED REGISTERED

## 2020-09-09 PROCEDURE — 27210794 ZZH OR GENERAL SUPPLY STERILE: Performed by: COLON & RECTAL SURGERY

## 2020-09-09 PROCEDURE — 36000053 ZZH SURGERY LEVEL 2 EA 15 ADDTL MIN - UMMC: Performed by: COLON & RECTAL SURGERY

## 2020-09-09 PROCEDURE — 25000125 ZZHC RX 250: Performed by: NURSE ANESTHETIST, CERTIFIED REGISTERED

## 2020-09-09 PROCEDURE — 36415 COLL VENOUS BLD VENIPUNCTURE: CPT | Performed by: ANESTHESIOLOGY

## 2020-09-09 PROCEDURE — 71000027 ZZH RECOVERY PHASE 2 EACH 15 MINS: Performed by: COLON & RECTAL SURGERY

## 2020-09-09 PROCEDURE — 25000132 ZZH RX MED GY IP 250 OP 250 PS 637: Performed by: ANESTHESIOLOGY

## 2020-09-09 PROCEDURE — 82565 ASSAY OF CREATININE: CPT | Performed by: ANESTHESIOLOGY

## 2020-09-09 PROCEDURE — 37000009 ZZH ANESTHESIA TECHNICAL FEE, EACH ADDTL 15 MIN: Performed by: COLON & RECTAL SURGERY

## 2020-09-09 PROCEDURE — 36000051 ZZH SURGERY LEVEL 2 1ST 30 MIN - UMMC: Performed by: COLON & RECTAL SURGERY

## 2020-09-09 PROCEDURE — 25000132 ZZH RX MED GY IP 250 OP 250 PS 637: Performed by: COLON & RECTAL SURGERY

## 2020-09-09 RX ORDER — NALOXONE HYDROCHLORIDE 0.4 MG/ML
.1-.4 INJECTION, SOLUTION INTRAMUSCULAR; INTRAVENOUS; SUBCUTANEOUS
Status: DISCONTINUED | OUTPATIENT
Start: 2020-09-09 | End: 2020-09-09 | Stop reason: HOSPADM

## 2020-09-09 RX ORDER — ACETIC ACID 5 %
LIQUID (ML) MISCELLANEOUS PRN
Status: DISCONTINUED | OUTPATIENT
Start: 2020-09-09 | End: 2020-09-09 | Stop reason: HOSPADM

## 2020-09-09 RX ORDER — HYDROCODONE BITARTRATE AND ACETAMINOPHEN 7.5; 325 MG/15ML; MG/15ML
10 SOLUTION ORAL
Status: DISCONTINUED | OUTPATIENT
Start: 2020-09-09 | End: 2020-09-09 | Stop reason: HOSPADM

## 2020-09-09 RX ORDER — PROPOFOL 10 MG/ML
INJECTION, EMULSION INTRAVENOUS CONTINUOUS PRN
Status: DISCONTINUED | OUTPATIENT
Start: 2020-09-09 | End: 2020-09-09

## 2020-09-09 RX ORDER — ONDANSETRON 2 MG/ML
4 INJECTION INTRAMUSCULAR; INTRAVENOUS EVERY 30 MIN PRN
Status: DISCONTINUED | OUTPATIENT
Start: 2020-09-09 | End: 2020-09-09 | Stop reason: HOSPADM

## 2020-09-09 RX ORDER — NAPROXEN 500 MG/1
500 TABLET ORAL 2 TIMES DAILY WITH MEALS
Qty: 60 TABLET | Refills: 1 | Status: ON HOLD | OUTPATIENT
Start: 2020-09-09 | End: 2020-11-11

## 2020-09-09 RX ORDER — ACETAMINOPHEN 500 MG
500 TABLET ORAL EVERY 6 HOURS PRN
Qty: 120 TABLET | Refills: 1 | Status: ON HOLD | OUTPATIENT
Start: 2020-09-09 | End: 2020-11-11

## 2020-09-09 RX ORDER — ONDANSETRON 2 MG/ML
INJECTION INTRAMUSCULAR; INTRAVENOUS PRN
Status: DISCONTINUED | OUTPATIENT
Start: 2020-09-09 | End: 2020-09-09

## 2020-09-09 RX ORDER — PHYSOSTIGMINE SALICYLATE 1 MG/ML
1.2 INJECTION INTRAVENOUS
Status: DISCONTINUED | OUTPATIENT
Start: 2020-09-09 | End: 2020-09-09 | Stop reason: HOSPADM

## 2020-09-09 RX ORDER — FENTANYL CITRATE 50 UG/ML
25-50 INJECTION, SOLUTION INTRAMUSCULAR; INTRAVENOUS
Status: DISCONTINUED | OUTPATIENT
Start: 2020-09-09 | End: 2020-09-09 | Stop reason: HOSPADM

## 2020-09-09 RX ORDER — HYDROMORPHONE HYDROCHLORIDE 1 MG/ML
.3-.5 INJECTION, SOLUTION INTRAMUSCULAR; INTRAVENOUS; SUBCUTANEOUS EVERY 10 MIN PRN
Status: DISCONTINUED | OUTPATIENT
Start: 2020-09-09 | End: 2020-09-09 | Stop reason: HOSPADM

## 2020-09-09 RX ORDER — ONDANSETRON 4 MG/1
4 TABLET, ORALLY DISINTEGRATING ORAL EVERY 30 MIN PRN
Status: DISCONTINUED | OUTPATIENT
Start: 2020-09-09 | End: 2020-09-09 | Stop reason: HOSPADM

## 2020-09-09 RX ORDER — LABETALOL 20 MG/4 ML (5 MG/ML) INTRAVENOUS SYRINGE
10
Status: DISCONTINUED | OUTPATIENT
Start: 2020-09-09 | End: 2020-09-09 | Stop reason: HOSPADM

## 2020-09-09 RX ORDER — FAMOTIDINE 10 MG
10 TABLET ORAL ONCE
Status: COMPLETED | OUTPATIENT
Start: 2020-09-09 | End: 2020-09-09

## 2020-09-09 RX ORDER — LIDOCAINE 40 MG/G
CREAM TOPICAL
Status: DISCONTINUED | OUTPATIENT
Start: 2020-09-09 | End: 2020-09-09 | Stop reason: HOSPADM

## 2020-09-09 RX ORDER — ACETAMINOPHEN 325 MG/1
975 TABLET ORAL ONCE
Status: DISCONTINUED | OUTPATIENT
Start: 2020-09-09 | End: 2020-09-09 | Stop reason: HOSPADM

## 2020-09-09 RX ORDER — FENTANYL CITRATE 50 UG/ML
INJECTION, SOLUTION INTRAMUSCULAR; INTRAVENOUS PRN
Status: DISCONTINUED | OUTPATIENT
Start: 2020-09-09 | End: 2020-09-09

## 2020-09-09 RX ORDER — FENTANYL CITRATE 50 UG/ML
25-50 INJECTION, SOLUTION INTRAMUSCULAR; INTRAVENOUS EVERY 5 MIN PRN
Status: DISCONTINUED | OUTPATIENT
Start: 2020-09-09 | End: 2020-09-09 | Stop reason: HOSPADM

## 2020-09-09 RX ORDER — HYDRALAZINE HYDROCHLORIDE 20 MG/ML
2.5-5 INJECTION INTRAMUSCULAR; INTRAVENOUS EVERY 10 MIN PRN
Status: DISCONTINUED | OUTPATIENT
Start: 2020-09-09 | End: 2020-09-09 | Stop reason: HOSPADM

## 2020-09-09 RX ORDER — BUPIVACAINE HYDROCHLORIDE AND EPINEPHRINE 2.5; 5 MG/ML; UG/ML
INJECTION, SOLUTION EPIDURAL; INFILTRATION; INTRACAUDAL; PERINEURAL PRN
Status: DISCONTINUED | OUTPATIENT
Start: 2020-09-09 | End: 2020-09-09 | Stop reason: HOSPADM

## 2020-09-09 RX ORDER — CITRIC ACID/SODIUM CITRATE 334-500MG
30 SOLUTION, ORAL ORAL ONCE
Status: COMPLETED | OUTPATIENT
Start: 2020-09-09 | End: 2020-09-09

## 2020-09-09 RX ORDER — CALCIUM CARBONATE 500 MG/1
500 TABLET, CHEWABLE ORAL ONCE
Status: COMPLETED | OUTPATIENT
Start: 2020-09-09 | End: 2020-09-09

## 2020-09-09 RX ORDER — ACETAMINOPHEN 325 MG/1
975 TABLET ORAL ONCE
Status: COMPLETED | OUTPATIENT
Start: 2020-09-09 | End: 2020-09-09

## 2020-09-09 RX ORDER — LIDOCAINE HYDROCHLORIDE 20 MG/ML
INJECTION, SOLUTION INFILTRATION; PERINEURAL PRN
Status: DISCONTINUED | OUTPATIENT
Start: 2020-09-09 | End: 2020-09-09

## 2020-09-09 RX ORDER — PROPOFOL 10 MG/ML
INJECTION, EMULSION INTRAVENOUS PRN
Status: DISCONTINUED | OUTPATIENT
Start: 2020-09-09 | End: 2020-09-09

## 2020-09-09 RX ORDER — SODIUM CHLORIDE, SODIUM LACTATE, POTASSIUM CHLORIDE, CALCIUM CHLORIDE 600; 310; 30; 20 MG/100ML; MG/100ML; MG/100ML; MG/100ML
INJECTION, SOLUTION INTRAVENOUS CONTINUOUS
Status: DISCONTINUED | OUTPATIENT
Start: 2020-09-09 | End: 2020-09-09 | Stop reason: HOSPADM

## 2020-09-09 RX ORDER — MEPERIDINE HYDROCHLORIDE 25 MG/ML
12.5 INJECTION INTRAMUSCULAR; INTRAVENOUS; SUBCUTANEOUS
Status: DISCONTINUED | OUTPATIENT
Start: 2020-09-09 | End: 2020-09-09 | Stop reason: HOSPADM

## 2020-09-09 RX ADMIN — PROPOFOL 30 MG: 10 INJECTION, EMULSION INTRAVENOUS at 09:08

## 2020-09-09 RX ADMIN — SODIUM CHLORIDE, POTASSIUM CHLORIDE, SODIUM LACTATE AND CALCIUM CHLORIDE: 600; 310; 30; 20 INJECTION, SOLUTION INTRAVENOUS at 08:53

## 2020-09-09 RX ADMIN — FENTANYL CITRATE 25 MCG: 50 INJECTION, SOLUTION INTRAMUSCULAR; INTRAVENOUS at 09:06

## 2020-09-09 RX ADMIN — SODIUM CITRATE AND CITRIC ACID MONOHYDRATE 30 ML: 500; 334 SOLUTION ORAL at 08:30

## 2020-09-09 RX ADMIN — LIDOCAINE HYDROCHLORIDE 60 MG: 20 INJECTION, SOLUTION INFILTRATION; PERINEURAL at 09:01

## 2020-09-09 RX ADMIN — MIDAZOLAM 2 MG: 1 INJECTION INTRAMUSCULAR; INTRAVENOUS at 08:48

## 2020-09-09 RX ADMIN — ONDANSETRON 4 MG: 2 INJECTION INTRAMUSCULAR; INTRAVENOUS at 09:41

## 2020-09-09 RX ADMIN — CALCIUM CARBONATE (ANTACID) CHEW TAB 500 MG 500 MG: 500 CHEW TAB at 11:17

## 2020-09-09 RX ADMIN — FAMOTIDINE 10 MG: 10 TABLET, FILM COATED ORAL at 08:29

## 2020-09-09 RX ADMIN — PROPOFOL 50 MCG/KG/MIN: 10 INJECTION, EMULSION INTRAVENOUS at 09:01

## 2020-09-09 RX ADMIN — ACETAMINOPHEN 975 MG: 325 TABLET, FILM COATED ORAL at 08:07

## 2020-09-09 ASSESSMENT — MIFFLIN-ST. JEOR: SCORE: 1730

## 2020-09-09 NOTE — OP NOTE
Procedure Date: 09/09/2020      PREOPERATIVE DIAGNOSIS:  Anal dysplasia.      POSTOPERATIVE DIAGNOSIS:  Anal dysplasia.      PROCEDURE:  Examination under anesthesia with high-definition anal microscopy, biopsy and fulguration of anal dysplasia.      HISTORY:  This 52-year-old HIV positive man was seen in the [facility] by VIKASH Hidalgo in 06/2018.  Anal cytology at that time showed LSIL.  The patient could not tolerate microscopy in the clinic and was accordingly referred for operative microscopy.  This proved difficult to schedule due to the patient having been homeless for some period of time.  He now returns for anal microscopy and treatment of any identified dysplasia under anesthesia.  I discussed the risks and alternatives of the procedure in detail to the patient.  I answered all of his questions to his stated satisfaction.  He expresses understanding and provided written informed consent.      SURGEON:  Thanh Lundberg MD.      ASSISTANT:   amy Dumont MD.      DESCRIPTION OF PROCEDURE:  With the patient in the left lateral decubitus position, under intravenous sedation by Anesthesia, the perianal skin was prepped and draped in sterile fashion.  A timeout was performed and the patient and procedure confirmed.  Local infiltration of 0.25% with epinephrine was utilized and a satisfactory perianal block was obtained.  The patient had numerous secretions that required turning off his sedation until these could be controlled.  He is probably a safer bet to perform his next microscopy under general anesthesia where we will have better control of his airway.  He very briefly desaturated, but responded rapidly to mask and had no ongoing problems.  The anal canal and perianal skin were soaked with 5% acetic acid and high-definition anal microscopy was performed using the colposcope.  There were no external lesions.  Eversion of the anal canal showed diffuse acetowhitening in  moderate to coarse punctation extending all the way to the anal margin.  Multiple representative biopsies were obtained from the anal margin and the anal canal and sent for permanent section.  The most intense areas of acetowhitening and punctation were fulgurated using electrocautery.  There were milder areas of acetowhitening that I chose not to address due to the moderately extensive fulguration of the anal canal that I already performed.  I feel it would be safer to await biopsies and repeat microscopy in several months' time to continue with fulguration after he has healed the current procedure.  The procedure was accordingly terminated and the patient was returned to the recovery area in satisfactory condition.  Sponge, needle and instrument counts were reported as correct at the conclusion of the case x2.  There were no immediate operative complications.            JOHNATHAN POLANCO MD             D: 2020   T: 2020   MT: TATA      Name:     BREE ROSAS   MRN:      [id number]        Account:        [id number]   :      1967           Procedure Date: 2020      Document: [id number]       cc: Carrie Mckeon MSN, NP-C       Caitie OLVERA MD

## 2020-09-09 NOTE — OR NURSING
PT's blood glucose elevated on arrival - results report to MIKY Medeiros.  Per MDA will address in OR as needed.  Confirmed with pt;s roommate Katty - ride home & 24 hour stay.  Roommate and pt report will take taxi home.

## 2020-09-09 NOTE — TELEPHONE ENCOUNTER
Called pt this morning to remind him of surgery apt at 6:30 today. Pt overslept but will hurry and get to hospital for Anoscopy procedure today at 8:30. Pt did NOT receive e-mail letting him know today's surgery was still on because he forgot his password for his e-mail.  Dalila Wilson RN

## 2020-09-09 NOTE — TELEPHONE ENCOUNTER
Central Prior Authorization Team   Phone: [phone]      PA Initiation    Medication: Novolog Flexpen 100 u/ml-PA initated  Insurance Company: [employer] [place] - Phone [phone] Fax [phone]  Pharmacy Filling the Rx: [facility] [employer] [place] [place] [facility] [address] [facility]  Filling Pharmacy Phone: [phone]  Filling Pharmacy Fax:    Start Date: 9/9/2020

## 2020-09-09 NOTE — ANESTHESIA CARE TRANSFER NOTE
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    Anesthesia Type:   No value filed.     Note:  Airway :Room Air  Patient transferred to:Phase II  Comments: Anesthesia Care Transfer Note      Transfer to:  PACU    Patient Vital Signs:  Stable    Airway:  None    Patient transported to PACU phase 2 on Room air with O2 saturation %.  Patient alert and breathing comfortably.  VSS.  Care transferred with report to PACU RN.    Javier Mendoza CRNAHandoff Report: Identifed the Patient, Identified the Reponsible Provider, Reviewed the pertinent medical history, Discussed the surgical course, Reviewed Intra-OP anesthesia mangement and issues during anesthesia, Set expectations for post-procedure period and Allowed opportunity for questions and acknowledgement of understanding      Vitals: (Last set prior to Anesthesia Care Transfer)    CRNA VITALS  9/9/2020 0931 - 9/9/2020 1008      9/9/2020             Pulse:  98    SpO2:  97 %                Electronically Signed By: VIKASH Vargas CRNA  September 9, 2020  10:08 AM

## 2020-09-09 NOTE — TELEPHONE ENCOUNTER
Prior Authorization Retail Medication Request    Medication/Dose: Novolog Flexpen 100 u/ml  ICD code (if different than what is on RX):   Previously Tried and Failed:  Humalog  Rationale:      Insurance Name:  Roseanne   Insurance ID:  [id number]       Pharmacy Information (if different than what is on RX)  Name:  [facility]  Phone:  [phone]    Lenka Mabry CPhT  [facility]  [phone]

## 2020-09-09 NOTE — DISCHARGE INSTRUCTIONS
Anorectal Surgery Instructions    What can I expect after anorectal surgery?  Most anorectal procedures are done as outpatient surgery, and you go home the same day as the procedure. A few surgical procedures will require that you stay in the hospital for about one to three days. No matter where the procedure is done or how long or short it takes, these recommendations will help you heal and feel more comfortable.    Medicines:  The anal area is very sensitive; you can expect to have some pain for up to 2-4 weeks after the procedure. Your doctor will give you a prescription for one or more pain medications.    Take naprosyn 500 mg twice a day OR ibuprofen 600 mg four times a day     Take this on a regular basis (not as needed) following your surgery.     The drugs are best taken with food.  Do not take if it causes stomach upset or if you have a history of ulcers or gastritis. You can stop the naprosyn (or ibuprofen) or reduce the dose when you are feeling better.    Take acetominaphen (Tylenol) 650-1000 mg four times a day.     Take this on a regular basis (not as needed) following surgery for pain control.     Take the lower dose if you are >65 years old or have liver disease. The maximum dose of acetominaphen is 4000 mg a day. You can stop the acetaminophen or reduce the dose when you are feeling better.    It is important to realize that many narcotic pain relievers (including vicodin, percocet, tylenol #3) also have acetaminophen, and excessive doses of acetaminophen can be dangerous, so do not take these in addition to acetominaphen.  You may take narcotics that don't contain acetominaphen such as oxycodone.      Take oxycodone AS NEEDED in addition to the acetominaphen and naprosyn.      Because narcotics have side effects (including constipation), you should reduce your use of these medications as tolerated as your pain improves.    *In general, the best strategy is to take (if you are able to tolerate it)  the tylenol and naprosen on a regular basis until your pain has largely gone away. You can take the narcotic pain medicine as needed in addition to the tylenol and ibuprofen. As your pain begins to lessen, you should cut back on your narcotic use while continuing to take your regular tylenol and naprosyn doses.      Refilling prescriptions. If you need additional pain medication, please call the triage nurse at [phone] during normal business hours (8 a.m. to 4 p.m., Monday though Friday) or have your pharmacy fax a refill request to [phone]. If you call after hours or on the weekends, the doctor on call may not know you personally and may not renew narcotic pain medication by phone. Call your primary care provider for all other medication refills.    Perineal care:  Tub baths:    If possible, take a tub bath immediately after each bowel movement.     Baths should be take at least 3 times daily for the first week to 10 days following your procedure. You should soak in the tub for 10 to 15 minutes each time with water as warm as you can tolerate.     Even after you go back to work, it is a good idea to sit in the tub in the morning, after returning from work, and again in the evening before bedtime.    Bleeding/Infection:    You can expect to have some bleeding after bowel movements, but it should stop soon after you wipe.     Use a wet cloth or perianal pad (Tucks or Preparation H pads) to gently wipe the area after each bowel movement.    Do not rub the anal area or use a lot of pressure.    Using a spray bottle filled with warm water helps loosen any remaining stool. Blot gently with a soft dry cloth or tissue paper.    Infection around the anal opening is not very common. The anal area has excellent blood supply, which helps the area to heal. Bloody discharge after bowel movements is normal and may last 2 to 4 weeks after your surgery. However, if you bleed between bowel movements and cannot get it to  stop, call the triage nurse immediately [phone].    Bowel function:  Take a fiber supplement such as Metamucil, which is over the counter. It is important to drink six to eight glasses of water or juice everyday when using fiber products.    If you do not have a bowel movement after 1-2 days:    Take Milk of Magnesia-2 tablespoons.       If there are no results, repeat this or add over the counter Miralax.      If you still do not have results, contact the clinic.     If there are no results, repeat this. Stop taking Milk of Magnesia or other laxatives if you begin to have diarrhea.    * Constipation will cause you to strain when you have a bowel movement. The hard stool will be difficult to pass, will increase pain and bleeding, and will slow down healing.  Try to avoid constipation and/or diarrhea as this can make the pain and bleeding worse.    * It is important to have regular bowel movements at least every other day and to keep your stool soft.  A high fiber diet, including at least four servings of fruits or vegetables daily, will help to keep your bowel movements regular and soft.    Activity:  After your procedure, there are no restrictions on your activity     except restrictions surrounding being on narcotics and in pain, such as no heavy machine operating or driving.     You may walk, climb stairs, ride in a car, and sit as tolerated.     It is helpful to avoid sitting in one position for long periods (2 or more hours).    After some surgeries, you may be told not to perform any lifting (more than 10 pounds) for several weeks after surgery.    When to call:  When do I need to call the doctor or triage nurse?    If you experience any of the problems listed here, call our triage nurse during business hours ([phone]).     The nurse will help you with your problem or have the doctor call you.     After hours and on weekends, please call the main hospital number ([phone]) and ask for the colon  and rectal surgery person on call.     Some is available to help you 24 hours a day, seven days a week.    Call for:   ? Fever greater than 101 degrees   ? Chills   ? Foul-smelling drainage   ? Nausea and vomiting   ? Diarrhea - greater than 3 water stools in 24 hours   ? Constipation - no bowel movement after 3 days   ? Severe bleeding that does not stop soon after a bowel movement   ? Problems with the incision, including increased pain, swelling, or redness    Take it easy when you get home.  Remember, same day surgery DOES NOT MEAN SAME DAY RECOVERY! Healing is a gradual process.   You will need some time to recover- you may be more tired than you realize at first.  Rest and relax for at least the first 24 hours at home.  You'll feel better and heal faster if you take good care of yourself.     [facility] [place]  Same-Day Surgery   Adult Discharge Orders & Instructions     For 24 hours after surgery    1. Get plenty of rest.  A responsible adult must stay with you for at least 24 hours after you leave the hospital.   2. Do not drive or use heavy equipment.  If you have weakness or tingling, don't drive or use heavy equipment until this feeling goes away.  3. Do not drink alcohol.  4. Avoid strenuous or risky activities.  Ask for help when climbing stairs.   5. You may feel lightheaded.  IF so, sit for a few minutes before standing.  Have someone help you get up.   6. If you have nausea (feel sick to your stomach): Drink only clear liquids such as apple juice, ginger ale, broth or 7-Up.  Rest may also help.  Be sure to drink enough fluids.  Move to a regular diet as you feel able.  7. You may have a slight fever. Call the doctor if your fever is over 100 F (37.7 C) (taken under the tongue) or lasts longer than 24 hours.  8. You may have a dry mouth, a sore throat, muscle aches or trouble sleeping.  These should go away after 24 hours.  9. Do not make important or legal decisions.    Call your doctor for any of the followin.  Signs of infection (fever, growing tenderness at the surgery site, a large amount of drainage or bleeding, severe pain, foul-smelling drainage, redness, swelling).    2. It has been over 8 to 10 hours since surgery and you are still not able to urinate (pass water).    3.  Headache for over 24 hours.    4.  Numbness, tingling or weakness the day after surgery (if you had spinal anesthesia).  To contact a doctor, call ____ [phone] [facility] - rectal triage RN______ or:        [phone] and ask for the resident on call for   ___[facility] - rectal surgery________ (answered 24 hours a day)      Emergency Department:    [facility]: [phone]

## 2020-09-09 NOTE — OR NURSING
Discharge instructions to pt and responsible adult.- Katty.            All questions regarding discharge information answered.  Pt and responsible adult verbalized understanding of all discharge instruction information given.  Printed copy of AVS sent with pt upon discharge.

## 2020-09-09 NOTE — ANESTHESIA POSTPROCEDURE EVALUATION
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     Anesthesia Type: MAC       Disposition: Outpatient   Postop Pain Control: Uneventful            Sign Out: Well controlled pain   PONV: No   Neuro/Psych: Uneventful            Sign Out: Acceptable/Baseline neuro status   Airway/Respiratory: Uneventful            Sign Out: Acceptable/Baseline resp. status   CV/Hemodynamics: Uneventful            Sign Out: Acceptable CV status   Other NRE: NONE   DID A NON-ROUTINE EVENT OCCUR? No         Last Anesthesia Record Vitals:  CRNA VITALS  2020 0931 - 2020 1031      2020             Pulse:  98    SpO2:  97 %          Last PACU Vitals:  Vitals Value Taken Time   BP     Temp     Pulse     Resp     SpO2     Temp src     NIBP 101/65 2020  9:53 AM   Pulse 98 2020 10:00 AM   SpO2 97 % 2020 10:00 AM   Resp     Temp     Ht Rate 99 2020  9:55 AM   Temp 2           Electronically Signed By: Sachin Medeiros MD, 2020, 12:35 PM

## 2020-09-10 ENCOUNTER — MEDICAL CORRESPONDENCE (OUTPATIENT)
Dept: HEALTH INFORMATION MANAGEMENT | Facility: CLINIC | Age: 55
End: 2020-09-10

## 2020-09-10 LAB — COPATH REPORT: NORMAL

## 2020-09-11 ENCOUNTER — MEDICAL CORRESPONDENCE (OUTPATIENT)
Dept: HEALTH INFORMATION MANAGEMENT | Facility: CLINIC | Age: 55
End: 2020-09-11

## 2020-09-11 ENCOUNTER — TELEPHONE (OUTPATIENT)
Dept: ENDOCRINOLOGY | Facility: CLINIC | Age: 55
End: 2020-09-11

## 2020-09-11 NOTE — TELEPHONE ENCOUNTER
Glucose results from [facility]   Fax   Emailed to provider and SENG Bullard. Copy labeled to scan.  Zunilda Lindo RN on 9/11/2020 at 12:56 PM

## 2020-09-11 NOTE — TELEPHONE ENCOUNTER
Prior Authorization Not Needed per Insurance    Medication: Novolog Flexpen 100 u/ml-PA not needed  Insurance Company: [employer] [place] - Phone [phone] Fax [phone]  Expected CoPay:      Pharmacy Filling the Rx: [email or web address] - [place], [place] - [address] [facility]  Pharmacy Notified: Yes-pharmacy has paid claim from 8/24. They said they did not send this.  Patient Notified: No-PHARMACY WILL CONTACT

## 2020-09-15 ENCOUNTER — TELEPHONE (OUTPATIENT)
Dept: NURSING | Facility: CLINIC | Age: 55
End: 2020-09-15

## 2020-09-15 ENCOUNTER — TELEPHONE (OUTPATIENT)
Dept: ENDOCRINOLOGY | Facility: CLINIC | Age: 55
End: 2020-09-15

## 2020-09-15 NOTE — TELEPHONE ENCOUNTER
Joanne didn't request the BS sent to [facility] Shrest[facility]. Sky quinn  is calling again today with highs . Last night he was 300 at HS so did the SS with novolog. This morning he  had coffee with creamer before he checked at 9:30 AM and BS was 424. I instructed him to go ahead with his Novolog 10 units +SS and Basaglar 30 units and we will contact them at   [phone] with any additional changes.   He  does:   Basaglar 30 units BID  Novolog Breakfast 10 units + SS, lunch 15 units + SS, Dinner 20 units + SS HS with BS over  200 .   They can't tell me what the creamer consists of if that affected the higher #. Aleah Hampton, RN on 9/15/2020 at 10:13 AM

## 2020-09-15 NOTE — TELEPHONE ENCOUNTER
Staff (Estelita Pa) at [facility] calling to report patient having high blood glucose reading above 350 today.    This nurse contacted Endocrinology and warm transfer given to Aleah (RN) for further assistance.

## 2020-09-16 DIAGNOSIS — Z79.4 TYPE 2 DIABETES MELLITUS WITHOUT COMPLICATION, WITH LONG-TERM CURRENT USE OF INSULIN (H): ICD-10-CM

## 2020-09-16 DIAGNOSIS — E11.9 TYPE 2 DIABETES MELLITUS WITHOUT COMPLICATION, WITH LONG-TERM CURRENT USE OF INSULIN (H): ICD-10-CM

## 2020-09-16 DIAGNOSIS — E11.65 TYPE 2 DIABETES MELLITUS WITH HYPERGLYCEMIA, WITH LONG-TERM CURRENT USE OF INSULIN (H): ICD-10-CM

## 2020-09-16 DIAGNOSIS — Z79.4 TYPE 2 DIABETES MELLITUS WITH HYPERGLYCEMIA, WITH LONG-TERM CURRENT USE OF INSULIN (H): ICD-10-CM

## 2020-09-16 RX ORDER — INSULIN GLARGINE 100 [IU]/ML
35 INJECTION, SOLUTION SUBCUTANEOUS 2 TIMES DAILY
Qty: 15 ML | Refills: 15 | Status: SHIPPED | OUTPATIENT
Start: 2020-09-16 | End: 2020-11-18

## 2020-09-16 RX ORDER — INSULIN ASPART 100 [IU]/ML
INJECTION, SOLUTION INTRAVENOUS; SUBCUTANEOUS
Qty: 75 ML | Refills: 3 | Status: SHIPPED | OUTPATIENT
Start: 2020-09-16 | End: 2020-11-18

## 2020-09-16 NOTE — PROGRESS NOTES
Spoke with Cielo at [facility] regarding glycemic control;   Andrew has required significant education on eating consistently, check BG prior to eating, and to take his insulin, and has not exhibited ability to self manage. Remaining hyperglycemic, often >300, on current regimen.     Recommend:  -increasing Basaglar to 35 units twice daily  -increasing Novolog to 20 units three times daily with meals (same dose for all meals, for simplicity)  -continue Novolog sliding scale.     Clinic support staff to fax new orders to [facility]. Cielo will follow up Thursday when at [facility] location to ensure they are implemented.     Long range- we will attempt to re-introduce oral and non insulin injectable antihyperglycemics in the future, to reduce his reliance on insulin. Anticipating transition to group home setting, where staff will be taking over his regimen, as he has not exhibited ability to self-manage and has other health issues ongoing.     Haley Shrestha PA-C  Diabetes Management Service  Pager [phone]

## 2020-09-17 ENCOUNTER — TELEPHONE (OUTPATIENT)
Dept: ENDOCRINOLOGY | Facility: CLINIC | Age: 55
End: 2020-09-17

## 2020-09-17 NOTE — TELEPHONE ENCOUNTER
Haley we faxed the new orders to the [facility] but they still have questions for bedtime insulin. Please call staff to make orders clear. Aleah ELLSWORTH

## 2020-09-17 NOTE — LETTER
HEALTH [address] [address]  [phone]  Fax     FACSIMILE TRANSMITTAL SHEET sent 17 SEP 2020 1:30PM    TO:  RAYMUNDO HERNANDEZ:   VAIL HOUSE  FAX NUMBER:     PHONE NUMBER:      FROM:   PHONE:   DATE: 09/17/20  NUMBER OF PAGES:     _____URGENT _____REVIEW ONLY _____PLEASE COMMENT____PLEASE REPLY    NOTES/COMMENTS: Order from [facility]aley Shrestha PA-C. Please let us know if you have any questions. Thank you.   RE: FabiánAnitaJulio.  Male, 52 year old, 11/27/1967                  IF YOU DID NOT RECEIVE THE CORRECT NUMBER OF PAGES OR THE FAX DID NOT COME THROUGH CLEARLY, PLEASE CALL THE SENDER     CONFIDENTIALITY STATEMENT: Confidential information that may accompany this transmission contains protected health information under state and federal law and is legally privileged. This information is intended only for the use of the individual or entity named above and may be used only for carrying out treatment, payment or other healthcare operations. The recipient or person responsible for delivering this information is prohibited by law from disclosing this information without proper authorization to any other party, unless required to do so by law or regulation. If you are not the intended recipient, you are hereby notified that any review, dissemination, distribution, or copying of this message is strictly prohibited. If you have received this communication in error, please destroy the materials and contact us immediately by calling the number listed above. No response indicates that the information was received by the appropriate authorized party

## 2020-09-17 NOTE — TELEPHONE ENCOUNTER
[facility] Call Center    Phone Message    May a detailed message be left on voicemail: yes     Reason for Call: Other: Cielo at [facility] looking for a call back to get some clarification on insulin scale changes that were made. Specifically there was no direction for bed time. please follow up      Action Taken: Message routed to:  [facility] Endo    Travel Screening: Not Applicable

## 2020-09-17 NOTE — TELEPHONE ENCOUNTER
Orders per Haley Shrestha faxed to Cielo at [facility] .  Zunilda Lindo RN on 9/17/2020 at 1:29 PM

## 2020-09-17 NOTE — TELEPHONE ENCOUNTER
[facility] Call Center    Phone Message    May a detailed message be left on voicemail: yes     Reason for Call: Order(s): Other:   Reason for requested: Cielo RN from [facility] stated that yesterday she was given verbal orders about a change in the Pt s insulin scale. She is requesting that and actual order of those verbal orders be faxed to them. Please fax to: [phone]  Date needed: asap  Provider name: Fady      Action Taken: Message routed to:  [facility] endo    Travel Screening: Not Applicable

## 2020-09-18 NOTE — TELEPHONE ENCOUNTER
Thank you, I called to clarify. They just wanted to confirm the Novolog with meals was 20 units TID (the dinner dose hadn't changed, and I confirmed)   Haley

## 2020-09-22 DIAGNOSIS — E11.65 TYPE 2 DIABETES MELLITUS WITH HYPERGLYCEMIA, WITHOUT LONG-TERM CURRENT USE OF INSULIN (H): ICD-10-CM

## 2020-09-24 ENCOUNTER — APPOINTMENT (OUTPATIENT)
Dept: GENERAL RADIOLOGY | Facility: CLINIC | Age: 55
End: 2020-09-24
Attending: INTERNAL MEDICINE
Payer: COMMERCIAL

## 2020-09-24 ENCOUNTER — HOSPITAL ENCOUNTER (EMERGENCY)
Facility: CLINIC | Age: 55
Discharge: HOME | End: 2020-09-24
Attending: EMERGENCY MEDICINE | Admitting: EMERGENCY MEDICINE
Payer: COMMERCIAL

## 2020-09-24 ENCOUNTER — HOSPITAL ENCOUNTER (INPATIENT)
Facility: CLINIC | Age: 55
LOS: 2 days | Discharge: HOME | End: 2020-09-27
Attending: INTERNAL MEDICINE | Admitting: INTERNAL MEDICINE
Payer: COMMERCIAL

## 2020-09-24 ENCOUNTER — APPOINTMENT (OUTPATIENT)
Dept: CT IMAGING | Facility: CLINIC | Age: 55
End: 2020-09-24
Attending: INTERNAL MEDICINE
Payer: COMMERCIAL

## 2020-09-24 VITALS
HEIGHT: 64 IN | TEMPERATURE: 99 F | OXYGEN SATURATION: 94 % | HEART RATE: 117 BPM | DIASTOLIC BLOOD PRESSURE: 80 MMHG | WEIGHT: 203.93 LBS | RESPIRATION RATE: 14 BRPM | SYSTOLIC BLOOD PRESSURE: 143 MMHG | BODY MASS INDEX: 34.82 KG/M2

## 2020-09-24 DIAGNOSIS — Z21 HUMAN IMMUNODEFICIENCY VIRUS I INFECTION (H): Primary | ICD-10-CM

## 2020-09-24 DIAGNOSIS — Z20.828 EXPOSURE TO SARS-ASSOCIATED CORONAVIRUS: ICD-10-CM

## 2020-09-24 DIAGNOSIS — R50.9 HIGH FEVER: ICD-10-CM

## 2020-09-24 DIAGNOSIS — K04.7 DENTAL INFECTION: ICD-10-CM

## 2020-09-24 DIAGNOSIS — A41.9 SEPSIS WITHOUT ACUTE ORGAN DYSFUNCTION, DUE TO UNSPECIFIED ORGANISM (H): ICD-10-CM

## 2020-09-24 DIAGNOSIS — J02.0 STREPTOCOCCAL PHARYNGITIS: ICD-10-CM

## 2020-09-24 DIAGNOSIS — K11.20 SIALADENITIS: ICD-10-CM

## 2020-09-24 LAB
ALBUMIN SERPL-MCNC: 3.6 G/DL (ref 3.4–5)
ALBUMIN UR-MCNC: NEGATIVE MG/DL
ALP SERPL-CCNC: 106 U/L (ref 40–150)
ALT SERPL W P-5'-P-CCNC: 29 U/L (ref 0–70)
AMPHETAMINES UR QL SCN: NEGATIVE
ANION GAP SERPL CALCULATED.3IONS-SCNC: 7 MMOL/L (ref 3–14)
APPEARANCE UR: CLEAR
AST SERPL W P-5'-P-CCNC: 22 U/L (ref 0–45)
BARBITURATES UR QL: NEGATIVE
BASOPHILS # BLD AUTO: 0.1 10E9/L (ref 0–0.2)
BASOPHILS NFR BLD AUTO: 0.4 %
BENZODIAZ UR QL: NEGATIVE
BILIRUB SERPL-MCNC: 1 MG/DL (ref 0.2–1.3)
BILIRUB UR QL STRIP: NEGATIVE
BUN SERPL-MCNC: 15 MG/DL (ref 7–30)
CALCIUM SERPL-MCNC: 9 MG/DL (ref 8.5–10.1)
CANNABINOIDS UR QL SCN: NEGATIVE
CHLORIDE SERPL-SCNC: 103 MMOL/L (ref 94–109)
CO2 SERPL-SCNC: 23 MMOL/L (ref 20–32)
COCAINE UR QL: NEGATIVE
COLOR UR AUTO: YELLOW
CREAT SERPL-MCNC: 0.91 MG/DL (ref 0.66–1.25)
DEPRECATED S PYO AG THROAT QL EIA: POSITIVE
DIFFERENTIAL METHOD BLD: ABNORMAL
EOSINOPHIL # BLD AUTO: 0.3 10E9/L (ref 0–0.7)
EOSINOPHIL NFR BLD AUTO: 2 %
ERYTHROCYTE [DISTWIDTH] IN BLOOD BY AUTOMATED COUNT: 12.9 % (ref 10–15)
ETHANOL UR QL SCN: NEGATIVE
GFR SERPL CREATININE-BSD FRML MDRD: >90 ML/MIN/{1.73_M2}
GLUCOSE SERPL-MCNC: 202 MG/DL (ref 70–99)
GLUCOSE UR STRIP-MCNC: 300 MG/DL
HCT VFR BLD AUTO: 39 % (ref 40–53)
HGB BLD-MCNC: 13.5 G/DL (ref 13.3–17.7)
HGB UR QL STRIP: ABNORMAL
IMM GRANULOCYTES # BLD: 0.1 10E9/L (ref 0–0.4)
IMM GRANULOCYTES NFR BLD: 0.7 %
KETONES UR STRIP-MCNC: NEGATIVE MG/DL
LACTATE BLD-SCNC: 1.8 MMOL/L (ref 0.7–2)
LEUKOCYTE ESTERASE UR QL STRIP: NEGATIVE
LYMPHOCYTES # BLD AUTO: 1.8 10E9/L (ref 0.8–5.3)
LYMPHOCYTES NFR BLD AUTO: 14.4 %
MCH RBC QN AUTO: 31.8 PG (ref 26.5–33)
MCHC RBC AUTO-ENTMCNC: 34.6 G/DL (ref 31.5–36.5)
MCV RBC AUTO: 92 FL (ref 78–100)
MONOCYTES # BLD AUTO: 0.9 10E9/L (ref 0–1.3)
MONOCYTES NFR BLD AUTO: 7.2 %
NEUTROPHILS # BLD AUTO: 9.6 10E9/L (ref 1.6–8.3)
NEUTROPHILS NFR BLD AUTO: 75.3 %
NITRATE UR QL: NEGATIVE
NRBC # BLD AUTO: 0 10*3/UL
NRBC BLD AUTO-RTO: 0 /100
OPIATES UR QL SCN: NEGATIVE
PH UR STRIP: 6.5 PH (ref 5–7)
PLATELET # BLD AUTO: 310 10E9/L (ref 150–450)
POTASSIUM SERPL-SCNC: 3.5 MMOL/L (ref 3.4–5.3)
PROT SERPL-MCNC: 7.5 G/DL (ref 6.8–8.8)
RBC # BLD AUTO: 4.25 10E12/L (ref 4.4–5.9)
RBC #/AREA URNS AUTO: 9 /HPF (ref 0–2)
SARS-COV-2 RNA SPEC QL NAA+PROBE: NORMAL
SODIUM SERPL-SCNC: 133 MMOL/L (ref 133–144)
SOURCE: ABNORMAL
SP GR UR STRIP: 1.02 (ref 1–1.03)
SPECIMEN SOURCE: ABNORMAL
SPECIMEN SOURCE: NORMAL
UROBILINOGEN UR STRIP-MCNC: NORMAL MG/DL (ref 0–2)
WBC # BLD AUTO: 12.7 10E9/L (ref 4–11)
WBC #/AREA URNS AUTO: 0 /HPF (ref 0–5)

## 2020-09-24 PROCEDURE — 99283 EMERGENCY DEPT VISIT LOW MDM: CPT

## 2020-09-24 PROCEDURE — 96365 THER/PROPH/DIAG IV INF INIT: CPT | Mod: 59 | Performed by: INTERNAL MEDICINE

## 2020-09-24 PROCEDURE — 87640 STAPH A DNA AMP PROBE: CPT | Performed by: INTERNAL MEDICINE

## 2020-09-24 PROCEDURE — 70491 CT SOFT TISSUE NECK W/DYE: CPT

## 2020-09-24 PROCEDURE — 87040 BLOOD CULTURE FOR BACTERIA: CPT | Performed by: EMERGENCY MEDICINE

## 2020-09-24 PROCEDURE — 87070 CULTURE OTHR SPECIMN AEROBIC: CPT | Performed by: STUDENT IN AN ORGANIZED HEALTH CARE EDUCATION/TRAINING PROGRAM

## 2020-09-24 PROCEDURE — 96372 THER/PROPH/DIAG INJ SC/IM: CPT | Performed by: INTERNAL MEDICINE

## 2020-09-24 PROCEDURE — 84145 PROCALCITONIN (PCT): CPT | Performed by: EMERGENCY MEDICINE

## 2020-09-24 PROCEDURE — 25800030 ZZH RX IP 258 OP 636: Performed by: INTERNAL MEDICINE

## 2020-09-24 PROCEDURE — 25000132 ZZH RX MED GY IP 250 OP 250 PS 637: Performed by: EMERGENCY MEDICINE

## 2020-09-24 PROCEDURE — 99285 EMERGENCY DEPT VISIT HI MDM: CPT | Mod: 25 | Performed by: INTERNAL MEDICINE

## 2020-09-24 PROCEDURE — 85025 COMPLETE CBC W/AUTO DIFF WBC: CPT | Performed by: EMERGENCY MEDICINE

## 2020-09-24 PROCEDURE — U0003 INFECTIOUS AGENT DETECTION BY NUCLEIC ACID (DNA OR RNA); SEVERE ACUTE RESPIRATORY SYNDROME CORONAVIRUS 2 (SARS-COV-2) (CORONAVIRUS DISEASE [COVID-19]), AMPLIFIED PROBE TECHNIQUE, MAKING USE OF HIGH THROUGHPUT TECHNOLOGIES AS DESCRIBED BY CMS-2020-01-R: HCPCS | Performed by: INTERNAL MEDICINE

## 2020-09-24 PROCEDURE — 25000128 H RX IP 250 OP 636: Performed by: STUDENT IN AN ORGANIZED HEALTH CARE EDUCATION/TRAINING PROGRAM

## 2020-09-24 PROCEDURE — 80053 COMPREHEN METABOLIC PANEL: CPT | Performed by: EMERGENCY MEDICINE

## 2020-09-24 PROCEDURE — 93005 ELECTROCARDIOGRAM TRACING: CPT | Performed by: INTERNAL MEDICINE

## 2020-09-24 PROCEDURE — 80307 DRUG TEST PRSMV CHEM ANLYZR: CPT | Performed by: INTERNAL MEDICINE

## 2020-09-24 PROCEDURE — 99285 EMERGENCY DEPT VISIT HI MDM: CPT | Mod: Z6 | Performed by: INTERNAL MEDICINE

## 2020-09-24 PROCEDURE — 25800030 ZZH RX IP 258 OP 636: Performed by: EMERGENCY MEDICINE

## 2020-09-24 PROCEDURE — 99284 EMERGENCY DEPT VISIT MOD MDM: CPT | Mod: Z6 | Performed by: EMERGENCY MEDICINE

## 2020-09-24 PROCEDURE — 81001 URINALYSIS AUTO W/SCOPE: CPT | Performed by: INTERNAL MEDICINE

## 2020-09-24 PROCEDURE — 87880 STREP A ASSAY W/OPTIC: CPT | Performed by: INTERNAL MEDICINE

## 2020-09-24 PROCEDURE — 87641 MR-STAPH DNA AMP PROBE: CPT | Performed by: INTERNAL MEDICINE

## 2020-09-24 PROCEDURE — 87086 URINE CULTURE/COLONY COUNT: CPT | Performed by: INTERNAL MEDICINE

## 2020-09-24 PROCEDURE — 96366 THER/PROPH/DIAG IV INF ADDON: CPT | Performed by: INTERNAL MEDICINE

## 2020-09-24 PROCEDURE — 83605 ASSAY OF LACTIC ACID: CPT | Performed by: EMERGENCY MEDICINE

## 2020-09-24 PROCEDURE — 25000128 H RX IP 250 OP 636: Performed by: INTERNAL MEDICINE

## 2020-09-24 PROCEDURE — 25000132 ZZH RX MED GY IP 250 OP 250 PS 637: Performed by: INTERNAL MEDICINE

## 2020-09-24 PROCEDURE — 96368 THER/DIAG CONCURRENT INF: CPT | Performed by: INTERNAL MEDICINE

## 2020-09-24 PROCEDURE — 80320 DRUG SCREEN QUANTALCOHOLS: CPT | Performed by: INTERNAL MEDICINE

## 2020-09-24 PROCEDURE — 71045 X-RAY EXAM CHEST 1 VIEW: CPT

## 2020-09-24 PROCEDURE — C9803 HOPD COVID-19 SPEC COLLECT: HCPCS | Performed by: INTERNAL MEDICINE

## 2020-09-24 RX ORDER — FAMOTIDINE 20 MG/1
20 TABLET, FILM COATED ORAL 2 TIMES DAILY
Status: DISCONTINUED | OUTPATIENT
Start: 2020-09-25 | End: 2020-09-27 | Stop reason: HOSPADM

## 2020-09-24 RX ORDER — CLINDAMYCIN PHOSPHATE 600 MG/50ML
600 INJECTION, SOLUTION INTRAVENOUS EVERY 12 HOURS
Status: DISCONTINUED | OUTPATIENT
Start: 2020-09-25 | End: 2020-09-25

## 2020-09-24 RX ORDER — POLYETHYLENE GLYCOL 3350 17 G/17G
17 POWDER, FOR SOLUTION ORAL DAILY PRN
Status: DISCONTINUED | OUTPATIENT
Start: 2020-09-24 | End: 2020-09-27 | Stop reason: HOSPADM

## 2020-09-24 RX ORDER — IBUPROFEN 600 MG/1
600 TABLET, FILM COATED ORAL ONCE
Status: DISCONTINUED | OUTPATIENT
Start: 2020-09-24 | End: 2020-09-24 | Stop reason: HOSPADM

## 2020-09-24 RX ORDER — IOPAMIDOL 755 MG/ML
100 INJECTION, SOLUTION INTRAVASCULAR ONCE
Status: COMPLETED | OUTPATIENT
Start: 2020-09-24 | End: 2020-09-24

## 2020-09-24 RX ORDER — NAPROXEN 500 MG/1
500 TABLET ORAL 2 TIMES DAILY WITH MEALS
Qty: 30 TABLET | Refills: 0 | Status: SHIPPED | OUTPATIENT
Start: 2020-09-24 | End: 2020-10-09

## 2020-09-24 RX ORDER — NICOTINE POLACRILEX 4 MG
15-30 LOZENGE BUCCAL
Status: DISCONTINUED | OUTPATIENT
Start: 2020-09-24 | End: 2020-09-27 | Stop reason: HOSPADM

## 2020-09-24 RX ORDER — AMOXICILLIN 250 MG/1
500 CAPSULE ORAL ONCE
Status: COMPLETED | OUTPATIENT
Start: 2020-09-24 | End: 2020-09-24

## 2020-09-24 RX ORDER — AMOXICILLIN 500 MG/1
500 CAPSULE ORAL 3 TIMES DAILY
Qty: 21 CAPSULE | Refills: 0 | Status: ON HOLD | OUTPATIENT
Start: 2020-09-24 | End: 2020-09-27

## 2020-09-24 RX ORDER — ATORVASTATIN CALCIUM 40 MG/1
80 TABLET, FILM COATED ORAL EVERY EVENING
Status: DISCONTINUED | OUTPATIENT
Start: 2020-09-25 | End: 2020-09-27 | Stop reason: HOSPADM

## 2020-09-24 RX ORDER — AMPICILLIN AND SULBACTAM 2; 1 G/1; G/1
3 INJECTION, POWDER, FOR SOLUTION INTRAMUSCULAR; INTRAVENOUS ONCE
Status: COMPLETED | OUTPATIENT
Start: 2020-09-24 | End: 2020-09-24

## 2020-09-24 RX ORDER — ACETAMINOPHEN 500 MG
1000 TABLET ORAL ONCE
Status: COMPLETED | OUTPATIENT
Start: 2020-09-24 | End: 2020-09-24

## 2020-09-24 RX ORDER — ASPIRIN 81 MG/1
81 TABLET, CHEWABLE ORAL DAILY
Status: DISCONTINUED | OUTPATIENT
Start: 2020-09-25 | End: 2020-09-27 | Stop reason: HOSPADM

## 2020-09-24 RX ORDER — DEXTROSE MONOHYDRATE 25 G/50ML
25-50 INJECTION, SOLUTION INTRAVENOUS
Status: DISCONTINUED | OUTPATIENT
Start: 2020-09-24 | End: 2020-09-27 | Stop reason: HOSPADM

## 2020-09-24 RX ADMIN — IOPAMIDOL 100 ML: 755 INJECTION, SOLUTION INTRAVENOUS at 20:54

## 2020-09-24 RX ADMIN — ACETAMINOPHEN 1000 MG: 500 TABLET ORAL at 20:15

## 2020-09-24 RX ADMIN — VANCOMYCIN HYDROCHLORIDE 1500 MG: 10 INJECTION, POWDER, LYOPHILIZED, FOR SOLUTION INTRAVENOUS at 23:31

## 2020-09-24 RX ADMIN — AMPICILLIN SODIUM AND SULBACTAM SODIUM 3 G: 2; 1 INJECTION, POWDER, FOR SOLUTION INTRAMUSCULAR; INTRAVENOUS at 20:09

## 2020-09-24 RX ADMIN — SODIUM CHLORIDE 1000 ML: 9 INJECTION, SOLUTION INTRAVENOUS at 19:03

## 2020-09-24 RX ADMIN — Medication 500 MG: at 06:10

## 2020-09-24 ASSESSMENT — ENCOUNTER SYMPTOMS
CHILLS: 1
NAUSEA: 0
FEVER: 0
CONFUSION: 0
BACK PAIN: 0
NECK PAIN: 0
SORE THROAT: 1
DIARRHEA: 0
PHOTOPHOBIA: 0
VOMITING: 0
ABDOMINAL PAIN: 0
LIGHT-HEADEDNESS: 0
FEVER: 1
DYSURIA: 0
SHORTNESS OF BREATH: 0
WHEEZING: 0
COUGH: 0
ABDOMINAL PAIN: 0
WEAKNESS: 0
SHORTNESS OF BREATH: 0
HEADACHES: 0
NUMBNESS: 0
RHINORRHEA: 0
ADENOPATHY: 0

## 2020-09-24 ASSESSMENT — MIFFLIN-ST. JEOR
SCORE: 1676
SCORE: 1671

## 2020-09-24 NOTE — ED PROVIDER NOTES
History     Chief Complaint   Patient presents with     Dental Pain     HPI  Andrew Lockwood is a 52 year old male who presents to us with a chief complaint of dental pain.  Patient states he has had some mildly worsening pain in his right lower incisor for the last 2 days.  Overnight it became much worse.  He denies any fevers or chills.  No nausea or vomiting.  He has not yet called a dentist for this.    I have reviewed the Medications, Allergies, Past Medical and Surgical History, and Social History in the [employer] system.  PAST MEDICAL HISTORY:   Past Medical History:   Diagnosis Date     AIDS (H)      Allergic rhinitis due to other allergen     DNS     Anal dysplasia      Chronic abdominal pain      CNS toxoplasmosis (H)      Diabetes type 2, controlled (H)      GERD (gastroesophageal reflux disease)      History of seizure      History of substance abuse (H)      HIV (human immunodeficiency virus infection) (H)      HLD (hyperlipidemia)      Lung nodules      Periungual wart      PTSD (post-traumatic stress disorder)      Sleep apnea     doesn't use CPAP       PAST SURGICAL HISTORY:   Past Surgical History:   Procedure Laterality Date     ANOSCOPY N/A 9/9/2020    Procedure: Exam Under Anesthesia, ANOSCOPY, fulgeration of rectal fissures with Rectal Biopsies;  Surgeon: Thanh Lundberg MD;  Location: [facility]     C NONSPECIFIC PROCEDURE      right forearm fracture     COLONOSCOPY Left 1/22/2016    Procedure: COMBINED COLONOSCOPY, SINGLE OR MULTIPLE BIOPSY/POLYPECTOMY BY BIOPSY;  Surgeon: Clark Saini MD;  [facility]     HC EXPLORE UNDESC TESTIS,INGUIN/SCROTAL       LAPAROSCOPIC APPENDECTOMY N/A 1/31/2018    Procedure: LAPAROSCOPIC APPENDECTOMY;  LAPAROSCOPIC APPENDECTOMY;  Surgeon: Dawn Holt MD;  Location: [facility]     LAPAROSCOPY DIAGNOSTIC (GENERAL) N/A 7/26/2016    Procedure: LAPAROSCOPY DIAGNOSTIC (GENERAL);  Surgeon: Susannah Arraiga MD;  Location: [facility]     LAPAROSCOPY  DIAGNOSTIC (GENERAL) N/A 4/16/2018    Procedure: LAPAROSCOPY DIAGNOSTIC (GENERAL);  Diagnostic laparoscopy and lysis of adhesions;  Surgeon: Prince Dowling MD;  Location: [facility]     OPTICAL TRACKING SYSTEM CRANIOTOMY, EXCISE TUMOR, COMBINED Left 4/10/2015    Procedure: COMBINED OPTICAL TRACKING SYSTEM CRANIOTOMY, EXCISE TUMOR;  Surgeon: Mirlande Colmenares MD;  Location: [facility]     REPAIR GAMEKEEPER'S THUMB Right 12/2/2016    Procedure: REPAIR LIGAMENT ULNAR COLLATERAL THUMB (GAMEKEEPER'S);  Surgeon: Evin Zamorano MD;  Location[facility]       Past medical history, past surgical history, medications, and allergies were reviewed with the patient. Additional pertinent items: None    FAMILY HISTORY:   Family History   Problem Relation Age of Onset     Diabetes Brother      Diabetes Father      Alzheimer Disease Father      Unknown/Adopted Mother      Diabetes Paternal Grandfather      Cancer No family hx of         no skin cancer     Skin Cancer No family hx of         no famiy hx of skin cancer     Glaucoma No family hx of      Macular Degeneration No family hx of        SOCIAL HISTORY:   Social History     Tobacco Use     Smoking status: Current Some Day Smoker     Packs/day: 0.50     Years: 40.00     Pack years: 20.00     Types: Cigarettes     Smokeless tobacco: Former User   Substance Use Topics     Alcohol use: No     Alcohol/week: 0.0 standard drinks     Drinks per session: Patient refused     Comment: Last etoh in 2007     Social history was reviewed with the patient. Additional pertinent items: None      Patient's Medications   New Prescriptions    No medications on file   Previous Medications    ACETAMINOPHEN (TYLENOL) 500 MG TABLET    Take 1 tablet (500 mg) by mouth every 6 hours as needed for mild pain    BICTEGRAVIR-EMTRICITABINE-TENOFOVIR (BIKTARVY) -25 MG PER TABLET    Take 1 tablet by mouth daily    BLOOD GLUCOSE (NO BRAND SPECIFIED) LANCETS STANDARD    Use to test blood sugar four times  daily or as directed.    BLOOD GLUCOSE (NO BRAND SPECIFIED) TEST STRIP    1 strip by In Vitro route 4 times daily (before meals and nightly) Use to test blood sugars 4 times daily or as directed    BLOOD GLUCOSE MONITORING (NO BRAND SPECIFIED) METER DEVICE KIT    Use to test blood sugar 4 times daily or as directed.    DOCUSATE SODIUM (COLACE) 50 MG CAPSULE    Take 2 capsules (100 mg) by mouth 2 times daily as needed for constipation    FAMOTIDINE (PEPCID) 20 MG TABLET    Take 1 tablet (20 mg) by mouth 2 times daily    INSULIN GLARGINE (BASAGLAR KWIKPEN) 100 UNIT/ML PEN    Inject 35 Units Subcutaneous 2 times daily    INSULIN PEN NEEDLE (B-D U/F) 31G X 8 MM MISCELLANEOUS    Use 1 pen needles daily or as directed.    INSULIN PEN NEEDLE (BD PEN NEEDLE SCOTT 2ND GEN) 32G X 4 MM MISCELLANEOUS    Use 3-4 daily.    NAPROXEN (NAPROSYN) 500 MG TABLET    Take 1 tablet (500 mg) by mouth 2 times daily (with meals)    NOVOLOG FLEXPEN 100 UNIT/ML SOLN    Inject 20 units with each meal, along with sliding scale 1 unit per 25mg/dL over 140 before meals and over 200 at bedtime. Max daily dose 90 units    NUTRITIONAL SUPPLEMENTS (GLUCOSE MANAGEMENT) TABS    Use 1-2 tabs for hypoglycemia.    POLYETHYLENE GLYCOL (MIRALAX) POWDER    Take 17 g (1 capful) by mouth daily as needed for constipation    PSYLLIUM (METAMUCIL SMOOTH TEXTURE) 28 % PACKET    Use one packet in 8oz water daily.    SENNA-DOCUSATE SODIUM (SENNA S) 8.6-50 MG TABLET    Take 1 tablet by mouth 2 times daily Hold for loose stools.    SENNOSIDES (SENOKOT) 8.6 MG TABLET    Take 1 tablet by mouth 2 times daily as needed for constipation    STATIN NOT PRESCRIBED (INTENTIONAL)    Please choose reason not prescribed, below   Modified Medications    No medications on file   Discontinued Medications    No medications on file          Allergies   Allergen Reactions     Metformin      Abdominal pain     Milk [Lac Bovis] Hives     Dulaglutide Rash        Review of Systems  "  Constitutional: Negative for fever.   HENT: Positive for dental problem.    Respiratory: Negative for shortness of breath.    Cardiovascular: Negative for chest pain.   Gastrointestinal: Negative for abdominal pain.   All other systems reviewed and are negative.    A complete review of systems was performed with pertinent positives and negatives noted in the HPI, and all other systems negative.       Physical Exam   BP: (!) 143/80  Pulse: 117  Temp: 99  F (37.2  C)  Resp: 14  Height: 162.6 cm (5' 4\")  Weight: 92.5 kg (203 lb 14.8 oz)  SpO2: 94 %      Physical Exam  Vitals signs and nursing note reviewed.   Constitutional:       General: He is not in acute distress.     Appearance: He is well-developed.   HENT:      Head: Normocephalic.      Mouth/Throat:      Comments: Tooth in the right lower jaw with obvious decay tender to palpation.  No apparent swelling or abscess  Eyes:      Extraocular Movements: Extraocular movements intact.   Neck:      Musculoskeletal: Neck supple.   Pulmonary:      Effort: No respiratory distress.   Musculoskeletal:         General: No deformity.   Skin:     General: Skin is dry.   Neurological:      Mental Status: He is alert.      Comments: Oriented   Psychiatric:         Behavior: Behavior normal.         ED Course        Procedures               No results found. However, due to the size of the patient record, not all encounters were searched. Please check Results Review for a complete set of results.  Medications   amoxicillin (AMOXIL) capsule 500 mg (has no administration in time range)   ibuprofen (ADVIL/MOTRIN) tablet 600 mg (has no administration in time range)             Assessments & Plan (with Medical Decision Making)   52-year-old male presents to us with a chief complaint of dental pain.  There is a suspicious-looking tooth that could certainly be infected at this point.  There is no obvious abscess.  We will give the patient prescription for antibiotics and recommend he " call his dentist later today.    I have reviewed the nursing notes.    I have reviewed the findings, diagnosis, plan and need for follow up with the patient.    New Prescriptions    No medications on file       Final diagnoses:   Dental infection       9/24/2020   [facility], [place], EMERGENCY DEPARTMENT     Shaw Stewart,   09/24/20 0617

## 2020-09-24 NOTE — ED PROVIDER NOTES
"ED Provider Note  [facility]      History     Chief Complaint   Patient presents with     Pharyngitis     Fever     HPI  Andrew Lockwood is a 52 year old male with a PMH for AIDS, DM2 (insulin shots), HLD, substance abuse, PTSD, GERD, CNS toxoplasmosis, seizures, chronic abdominal pain and anoscopy 09/09/20 who presents to the ED with complaint of pharyngitis and fever.    Patient reports ***    {Past History:876874}      Review of Systems  {Complete vs limited ROS:321344::\"A complete review of systems was performed with pertinent positives and negatives noted in the HPI, and all other systems negative.\"}    Physical Exam   BP: (!) 140/73  Height: 162.6 cm (5' 4\")  Weight: 92 kg (202 lb 13.2 oz)  Physical Exam  ***  ED Course      Procedures        {EKG done?:941888::\" \"}    {ed addendum:085790::\" \"}  {trauma activation or Fall?:336874::\" \"}  {Sepsis/Septic Shock/Stemi/Stroke:318220::\" \"}     No results found for any visits on 09/24/20.  Medications - No data to display     Assessments & Plan (with Medical Decision Making)   ***    I have reviewed the nursing notes. I have reviewed the findings, diagnosis, plan and need for follow up with the patient.    New Prescriptions    No medications on file       Final diagnoses:   None       --  ***  [facility], [place], EMERGENCY DEPARTMENT  9/24/2020  "

## 2020-09-24 NOTE — TELEPHONE ENCOUNTER
insulin pen needle (BD PEN NEEDLE SCOTT 2ND GEN) 32G X 4 MM miscellaneous     Use 3 to 4 needles daily pen needles daily or as directed     Last Written Prescription Date:  6/7/19  Last Fill Quantity: 250   # refills: 3  Last Office Visit : 8/24/20 virtual visit    Return in about 3 weeks (around 9/14/2020).     Future Office visit: none    Routing refill request to provider for review/approval because:  Please review. Active order for pen needles ordered 6/9/20 with less frequency.

## 2020-09-24 NOTE — ED AVS SNAPSHOT
[facility], [place], Emergency Department  [address] [place] [id number]  Phone:  [phone]                                    Andrew Lockwood   MRN: [id number]    Department:  [facility], Emergency Department   Date of Visit:  9/24/2020           After Visit Summary Signature Page    I have received my discharge instructions, and my questions have been answered. I have discussed any challenges I see with this plan with the nurse or doctor.    ..........................................................................................................................................  Patient/Patient Representative Signature      ..........................................................................................................................................  Patient Representative Print Name and Relationship to Patient    ..................................................               ................................................  Date                                   Time    ..........................................................................................................................................  Reviewed by Signature/Title    ...................................................              ..............................................  Date                                               Time          22EPIC Rev 08/18

## 2020-09-25 PROBLEM — J02.0 STREPTOCOCCAL PHARYNGITIS: Status: ACTIVE | Noted: 2020-09-25

## 2020-09-25 PROBLEM — J02.0 STREP SORE THROAT: Status: ACTIVE | Noted: 2020-09-25

## 2020-09-25 LAB
ALBUMIN SERPL-MCNC: 2.9 G/DL (ref 3.4–5)
ALP SERPL-CCNC: 88 U/L (ref 40–150)
ALT SERPL W P-5'-P-CCNC: 26 U/L (ref 0–70)
ANION GAP SERPL CALCULATED.3IONS-SCNC: 7 MMOL/L (ref 3–14)
AST SERPL W P-5'-P-CCNC: 28 U/L (ref 0–45)
BACTERIA SPEC CULT: NO GROWTH
BASOPHILS # BLD AUTO: 0 10E9/L (ref 0–0.2)
BASOPHILS NFR BLD AUTO: 0.3 %
BILIRUB SERPL-MCNC: 1.1 MG/DL (ref 0.2–1.3)
BUN SERPL-MCNC: 9 MG/DL (ref 7–30)
CALCIUM SERPL-MCNC: 7.8 MG/DL (ref 8.5–10.1)
CHLORIDE SERPL-SCNC: 110 MMOL/L (ref 94–109)
CHOLEST SERPL-MCNC: 134 MG/DL
CO2 SERPL-SCNC: 22 MMOL/L (ref 20–32)
CREAT SERPL-MCNC: 0.89 MG/DL (ref 0.66–1.25)
DIFFERENTIAL METHOD BLD: ABNORMAL
EOSINOPHIL # BLD AUTO: 0.1 10E9/L (ref 0–0.7)
EOSINOPHIL NFR BLD AUTO: 1.1 %
ERYTHROCYTE [DISTWIDTH] IN BLOOD BY AUTOMATED COUNT: 13.1 % (ref 10–15)
GFR SERPL CREATININE-BSD FRML MDRD: >90 ML/MIN/{1.73_M2}
GLUCOSE BLDC GLUCOMTR-MCNC: 103 MG/DL (ref 70–99)
GLUCOSE BLDC GLUCOMTR-MCNC: 129 MG/DL (ref 70–99)
GLUCOSE BLDC GLUCOMTR-MCNC: 88 MG/DL (ref 70–99)
GLUCOSE BLDC GLUCOMTR-MCNC: 98 MG/DL (ref 70–99)
GLUCOSE SERPL-MCNC: 102 MG/DL (ref 70–99)
HBA1C MFR BLD: 8.2 % (ref 0–5.6)
HBV SURFACE AG SERPL QL IA: NONREACTIVE
HCT VFR BLD AUTO: 35.4 % (ref 40–53)
HCV AB SERPL QL IA: NONREACTIVE
HDLC SERPL-MCNC: 31 MG/DL
HGB BLD-MCNC: 11.9 G/DL (ref 13.3–17.7)
IMM GRANULOCYTES # BLD: 0.1 10E9/L (ref 0–0.4)
IMM GRANULOCYTES NFR BLD: 0.8 %
INTERPRETATION ECG - MUSE: NORMAL
LABORATORY COMMENT REPORT: NORMAL
LACTATE BLD-SCNC: 1.7 MMOL/L (ref 0.7–2)
LDLC SERPL CALC-MCNC: 75 MG/DL
LYMPHOCYTES # BLD AUTO: 1.4 10E9/L (ref 0.8–5.3)
LYMPHOCYTES NFR BLD AUTO: 13.1 %
Lab: NORMAL
MCH RBC QN AUTO: 31.5 PG (ref 26.5–33)
MCHC RBC AUTO-ENTMCNC: 33.6 G/DL (ref 31.5–36.5)
MCV RBC AUTO: 94 FL (ref 78–100)
MONOCYTES # BLD AUTO: 0.9 10E9/L (ref 0–1.3)
MONOCYTES NFR BLD AUTO: 8.1 %
MRSA DNA SPEC QL NAA+PROBE: NEGATIVE
NEUTROPHILS # BLD AUTO: 8.2 10E9/L (ref 1.6–8.3)
NEUTROPHILS NFR BLD AUTO: 76.6 %
NONHDLC SERPL-MCNC: 103 MG/DL
NRBC # BLD AUTO: 0 10*3/UL
NRBC BLD AUTO-RTO: 0 /100
PLATELET # BLD AUTO: 255 10E9/L (ref 150–450)
POTASSIUM SERPL-SCNC: 3.4 MMOL/L (ref 3.4–5.3)
PROCALCITONIN SERPL-MCNC: <0.05 NG/ML
PROT SERPL-MCNC: 6.1 G/DL (ref 6.8–8.8)
RBC # BLD AUTO: 3.78 10E12/L (ref 4.4–5.9)
SARS-COV-2 RNA SPEC QL NAA+PROBE: NEGATIVE
SODIUM SERPL-SCNC: 139 MMOL/L (ref 133–144)
SPECIMEN SOURCE: NORMAL
T PALLIDUM AB SER QL: NONREACTIVE
TRIGL SERPL-MCNC: 136 MG/DL
TSH SERPL DL<=0.005 MIU/L-ACNC: 0.87 MU/L (ref 0.4–4)
WBC # BLD AUTO: 10.6 10E9/L (ref 4–11)

## 2020-09-25 PROCEDURE — 96361 HYDRATE IV INFUSION ADD-ON: CPT | Performed by: INTERNAL MEDICINE

## 2020-09-25 PROCEDURE — 25000125 ZZHC RX 250: Performed by: PHYSICIAN ASSISTANT

## 2020-09-25 PROCEDURE — 25800030 ZZH RX IP 258 OP 636: Performed by: INTERNAL MEDICINE

## 2020-09-25 PROCEDURE — 87340 HEPATITIS B SURFACE AG IA: CPT | Performed by: STUDENT IN AN ORGANIZED HEALTH CARE EDUCATION/TRAINING PROGRAM

## 2020-09-25 PROCEDURE — 25000128 H RX IP 250 OP 636: Performed by: INTERNAL MEDICINE

## 2020-09-25 PROCEDURE — 40000556 ZZH STATISTIC PERIPHERAL IV START W US GUIDANCE

## 2020-09-25 PROCEDURE — 85025 COMPLETE CBC W/AUTO DIFF WBC: CPT | Performed by: STUDENT IN AN ORGANIZED HEALTH CARE EDUCATION/TRAINING PROGRAM

## 2020-09-25 PROCEDURE — 00000146 ZZHCL STATISTIC GLUCOSE BY METER IP

## 2020-09-25 PROCEDURE — 25000128 H RX IP 250 OP 636: Performed by: STUDENT IN AN ORGANIZED HEALTH CARE EDUCATION/TRAINING PROGRAM

## 2020-09-25 PROCEDURE — 25000132 ZZH RX MED GY IP 250 OP 250 PS 637: Performed by: PHYSICIAN ASSISTANT

## 2020-09-25 PROCEDURE — 36415 COLL VENOUS BLD VENIPUNCTURE: CPT | Performed by: STUDENT IN AN ORGANIZED HEALTH CARE EDUCATION/TRAINING PROGRAM

## 2020-09-25 PROCEDURE — 25000132 ZZH RX MED GY IP 250 OP 250 PS 637: Performed by: STUDENT IN AN ORGANIZED HEALTH CARE EDUCATION/TRAINING PROGRAM

## 2020-09-25 PROCEDURE — 86780 TREPONEMA PALLIDUM: CPT | Performed by: STUDENT IN AN ORGANIZED HEALTH CARE EDUCATION/TRAINING PROGRAM

## 2020-09-25 PROCEDURE — 83605 ASSAY OF LACTIC ACID: CPT | Performed by: STUDENT IN AN ORGANIZED HEALTH CARE EDUCATION/TRAINING PROGRAM

## 2020-09-25 PROCEDURE — 80061 LIPID PANEL: CPT | Performed by: STUDENT IN AN ORGANIZED HEALTH CARE EDUCATION/TRAINING PROGRAM

## 2020-09-25 PROCEDURE — 25800030 ZZH RX IP 258 OP 636: Performed by: STUDENT IN AN ORGANIZED HEALTH CARE EDUCATION/TRAINING PROGRAM

## 2020-09-25 PROCEDURE — 84443 ASSAY THYROID STIM HORMONE: CPT | Performed by: STUDENT IN AN ORGANIZED HEALTH CARE EDUCATION/TRAINING PROGRAM

## 2020-09-25 PROCEDURE — 83036 HEMOGLOBIN GLYCOSYLATED A1C: CPT | Performed by: STUDENT IN AN ORGANIZED HEALTH CARE EDUCATION/TRAINING PROGRAM

## 2020-09-25 PROCEDURE — 25000125 ZZHC RX 250: Performed by: STUDENT IN AN ORGANIZED HEALTH CARE EDUCATION/TRAINING PROGRAM

## 2020-09-25 PROCEDURE — 99223 1ST HOSP IP/OBS HIGH 75: CPT | Mod: AI | Performed by: STUDENT IN AN ORGANIZED HEALTH CARE EDUCATION/TRAINING PROGRAM

## 2020-09-25 PROCEDURE — 80053 COMPREHEN METABOLIC PANEL: CPT | Performed by: STUDENT IN AN ORGANIZED HEALTH CARE EDUCATION/TRAINING PROGRAM

## 2020-09-25 PROCEDURE — 96372 THER/PROPH/DIAG INJ SC/IM: CPT

## 2020-09-25 PROCEDURE — 86803 HEPATITIS C AB TEST: CPT | Performed by: STUDENT IN AN ORGANIZED HEALTH CARE EDUCATION/TRAINING PROGRAM

## 2020-09-25 PROCEDURE — G0378 HOSPITAL OBSERVATION PER HR: HCPCS

## 2020-09-25 PROCEDURE — 25000131 ZZH RX MED GY IP 250 OP 636 PS 637: Performed by: STUDENT IN AN ORGANIZED HEALTH CARE EDUCATION/TRAINING PROGRAM

## 2020-09-25 RX ORDER — PEN NEEDLE, DIABETIC 32GX 5/32"
NEEDLE, DISPOSABLE MISCELLANEOUS
Qty: 400 EACH | Refills: 3 | Status: ON HOLD | OUTPATIENT
Start: 2020-09-25 | End: 2021-07-17

## 2020-09-25 RX ORDER — ACETAMINOPHEN 325 MG/1
650 TABLET ORAL EVERY 4 HOURS PRN
Status: DISCONTINUED | OUTPATIENT
Start: 2020-09-25 | End: 2020-09-25

## 2020-09-25 RX ORDER — KETOROLAC TROMETHAMINE 15 MG/ML
30 INJECTION, SOLUTION INTRAMUSCULAR; INTRAVENOUS ONCE
Status: DISCONTINUED | OUTPATIENT
Start: 2020-09-25 | End: 2020-09-25

## 2020-09-25 RX ORDER — NAPROXEN 500 MG/1
500 TABLET ORAL 2 TIMES DAILY WITH MEALS
Status: DISCONTINUED | OUTPATIENT
Start: 2020-09-25 | End: 2020-09-27 | Stop reason: HOSPADM

## 2020-09-25 RX ORDER — ONDANSETRON 2 MG/ML
4 INJECTION INTRAMUSCULAR; INTRAVENOUS EVERY 6 HOURS PRN
Status: DISCONTINUED | OUTPATIENT
Start: 2020-09-25 | End: 2020-09-27 | Stop reason: HOSPADM

## 2020-09-25 RX ORDER — ACETAMINOPHEN 325 MG/1
325 TABLET ORAL EVERY 4 HOURS PRN
Status: DISCONTINUED | OUTPATIENT
Start: 2020-09-25 | End: 2020-09-25

## 2020-09-25 RX ORDER — NICOTINE 21 MG/24HR
1 PATCH, TRANSDERMAL 24 HOURS TRANSDERMAL DAILY
Status: DISCONTINUED | OUTPATIENT
Start: 2020-09-25 | End: 2020-09-26

## 2020-09-25 RX ORDER — CLINDAMYCIN PHOSPHATE 900 MG/50ML
900 INJECTION, SOLUTION INTRAVENOUS EVERY 8 HOURS
Status: DISCONTINUED | OUTPATIENT
Start: 2020-09-25 | End: 2020-09-25

## 2020-09-25 RX ORDER — HEPARIN SODIUM 5000 [USP'U]/.5ML
5000 INJECTION, SOLUTION INTRAVENOUS; SUBCUTANEOUS EVERY 12 HOURS
Status: DISCONTINUED | OUTPATIENT
Start: 2020-09-25 | End: 2020-09-27 | Stop reason: HOSPADM

## 2020-09-25 RX ORDER — SODIUM CHLORIDE 9 MG/ML
INJECTION, SOLUTION INTRAVENOUS CONTINUOUS
Status: DISCONTINUED | OUTPATIENT
Start: 2020-09-25 | End: 2020-09-27 | Stop reason: HOSPADM

## 2020-09-25 RX ORDER — PENICILLIN V POTASSIUM 500 MG/1
500 TABLET, FILM COATED ORAL EVERY 8 HOURS SCHEDULED
Status: DISCONTINUED | OUTPATIENT
Start: 2020-09-25 | End: 2020-09-27 | Stop reason: HOSPADM

## 2020-09-25 RX ADMIN — FAMOTIDINE 20 MG: 20 TABLET ORAL at 09:48

## 2020-09-25 RX ADMIN — ACETAMINOPHEN 325 MG: 325 TABLET, FILM COATED ORAL at 14:01

## 2020-09-25 RX ADMIN — SODIUM CHLORIDE 1000 ML: 9 INJECTION, SOLUTION INTRAVENOUS at 01:09

## 2020-09-25 RX ADMIN — ASPIRIN 81 MG CHEWABLE TABLET 81 MG: 81 TABLET CHEWABLE at 09:47

## 2020-09-25 RX ADMIN — ATORVASTATIN CALCIUM 80 MG: 40 TABLET, FILM COATED ORAL at 19:40

## 2020-09-25 RX ADMIN — FAMOTIDINE 20 MG: 20 TABLET ORAL at 19:07

## 2020-09-25 RX ADMIN — FAMOTIDINE 20 MG: 20 TABLET ORAL at 00:25

## 2020-09-25 RX ADMIN — PENICILLIN V POTASIUM 500 MG: 500 TABLET OROPHARYNGEAL at 14:01

## 2020-09-25 RX ADMIN — ACETAMINOPHEN 325 MG: 325 TABLET, FILM COATED ORAL at 02:29

## 2020-09-25 RX ADMIN — CLINDAMYCIN IN 5 PERCENT DEXTROSE 900 MG: 18 INJECTION, SOLUTION INTRAVENOUS at 09:43

## 2020-09-25 RX ADMIN — VANCOMYCIN HYDROCHLORIDE 1500 MG: 10 INJECTION, POWDER, LYOPHILIZED, FOR SOLUTION INTRAVENOUS at 11:48

## 2020-09-25 RX ADMIN — BENZOCAINE AND MENTHOL 1 LOZENGE: 15; 3.6 LOZENGE ORAL at 14:01

## 2020-09-25 RX ADMIN — PENICILLIN V POTASIUM 500 MG: 500 TABLET OROPHARYNGEAL at 21:20

## 2020-09-25 RX ADMIN — INSULIN GLARGINE 35 UNITS: 100 INJECTION, SOLUTION SUBCUTANEOUS at 09:54

## 2020-09-25 RX ADMIN — ACETAMINOPHEN 325 MG: 325 TABLET, FILM COATED ORAL at 09:48

## 2020-09-25 RX ADMIN — CLINDAMYCIN IN 5 PERCENT DEXTROSE 600 MG: 12 INJECTION, SOLUTION INTRAVENOUS at 01:10

## 2020-09-25 RX ADMIN — NAPROXEN 500 MG: 500 TABLET ORAL at 21:19

## 2020-09-25 RX ADMIN — BENZOCAINE AND MENTHOL 1 LOZENGE: 15; 3.6 LOZENGE ORAL at 17:32

## 2020-09-25 RX ADMIN — LIDOCAINE HYDROCHLORIDE 30 ML: 20 SOLUTION ORAL; TOPICAL at 19:40

## 2020-09-25 RX ADMIN — SODIUM CHLORIDE 1000 ML: 9 INJECTION, SOLUTION INTRAVENOUS at 04:56

## 2020-09-25 RX ADMIN — HEPARIN SODIUM 5000 UNITS: 5000 INJECTION, SOLUTION INTRAVENOUS; SUBCUTANEOUS at 19:40

## 2020-09-25 RX ADMIN — INSULIN GLARGINE 35 UNITS: 100 INJECTION, SOLUTION SUBCUTANEOUS at 00:26

## 2020-09-25 RX ADMIN — SODIUM CHLORIDE: 9 INJECTION, SOLUTION INTRAVENOUS at 14:03

## 2020-09-25 RX ADMIN — SODIUM CHLORIDE 1000 ML: 9 INJECTION, SOLUTION INTRAVENOUS at 03:52

## 2020-09-25 RX ADMIN — SODIUM CHLORIDE: 9 INJECTION, SOLUTION INTRAVENOUS at 05:53

## 2020-09-25 RX ADMIN — BENZOCAINE AND MENTHOL 1 LOZENGE: 15; 3.6 LOZENGE ORAL at 11:47

## 2020-09-25 RX ADMIN — HEPARIN SODIUM 5000 UNITS: 5000 INJECTION, SOLUTION INTRAVENOUS; SUBCUTANEOUS at 09:49

## 2020-09-25 RX ADMIN — BICTEGRAVIR SODIUM, EMTRICITABINE, AND TENOFOVIR ALAFENAMIDE FUMARATE 1 TABLET: 50; 200; 25 TABLET ORAL at 09:48

## 2020-09-25 RX ADMIN — ATORVASTATIN CALCIUM 80 MG: 40 TABLET, FILM COATED ORAL at 00:24

## 2020-09-25 ASSESSMENT — ACTIVITIES OF DAILY LIVING (ADL)
RETIRED_EATING: 0-->INDEPENDENT
AMBULATION: 0-->INDEPENDENT
FALL_HISTORY_WITHIN_LAST_SIX_MONTHS: NO
ADLS_ACUITY_SCORE: 13
DRESS: 0-->INDEPENDENT
TRANSFERRING: 0-->INDEPENDENT
COGNITION: 0 - NO COGNITION ISSUES REPORTED
RETIRED_COMMUNICATION: 0-->UNDERSTANDS/COMMUNICATES WITHOUT DIFFICULTY
SWALLOWING: 0-->SWALLOWS FOODS/LIQUIDS WITHOUT DIFFICULTY
BATHING: 0-->INDEPENDENT
TOILETING: 0-->INDEPENDENT

## 2020-09-25 ASSESSMENT — MIFFLIN-ST. JEOR: SCORE: 1675.88

## 2020-09-25 NOTE — UTILIZATION REVIEW
"Admission Status; Secondary Review Determination     Under the authority of the Utilization Management Committee, the utilization review process indicated a secondary review on the above patient.  The review outcome is based on review of the medical records, discussions with staff, and applying clinical experience noted on the date of the review.       (x) Observation Status Appropriate - This patient does not meet hospital inpatient criteria and is placed in observation status. If this patient's primary payer is Medicare and was admitted as an inpatient, Condition Code 44 should be used and patient status changed to \"observation\".     RATIONALE FOR DETERMINATION: 52-year-old male with history of HIV, history of substance abuse who presented with a sore throat after being initiated on amoxicillin for a tooth infection.  Patient found to have acute Streptococcus group A pharyngitis.  Observation care appropriate for initial IV fluids and IV antibiotics while monitoring initial response to treatment with follow-up outpatient oral antibiotics.        The severity of illness, intensity of service provided, expected LOS and risk for adverse outcome make the care appropriate for further observation; however, doesn't meet criteria for hospital inpatient admission. This was discussed with attending physician who concurred with this determination.    The information on this document is developed by the utilization review team in order for the business office to ensure compliance.  This only denotes the appropriateness of proper admission status and does not reflect the quality of care rendered.         The definitions of Inpatient Status and Observation Status used in making the determination above are those provided in the CMS Coverage Manual, Chapter 1 and Chapter 6, section 70.4.      Sincerely,     Kade Pillai MD    Physician Advisor  Utilization Review/ Case Management  [facility].    "

## 2020-09-25 NOTE — ED NOTES
[facility], [place]   ED Nurse to Floor Handoff     Andrew Lockwood is a 52 year old male who speaks English and lives with others,  in a home  They arrived in the ED by car from home    ED Chief Complaint: Pharyngitis and Fever    ED Dx;   Final diagnoses:   Sialadenitis   High fever   Sepsis without acute organ dysfunction, due to unspecified organism (H)   Streptococcal pharyngitis         Needed?: No    Allergies:   Allergies   Allergen Reactions     Metformin      Abdominal pain     Milk [Lac Bovis] Hives     Dulaglutide Rash   .  Past Medical Hx:   Past Medical History:   Diagnosis Date     AIDS (H)      Allergic rhinitis due to other allergen     DNS     Anal dysplasia      Chronic abdominal pain      CNS toxoplasmosis (H)      Diabetes type 2, controlled (H)      GERD (gastroesophageal reflux disease)      History of seizure      History of substance abuse (H)      HIV (human immunodeficiency virus infection) (H)      HLD (hyperlipidemia)      Lung nodules      Periungual wart      PTSD (post-traumatic stress disorder)      Sleep apnea     doesn't use CPAP      Baseline Mental status: WDL  Current Mental Status changes: at basesline    Infection present or suspected this encounter: yes other Strept A  Sepsis suspected: No  Isolation type: Contact, Special Precautions-COVID-19     Activity level - Baseline/Home:  Independent  Activity Level - Current:   Independent    Bariatric equipment needed?: No    In the ED these meds were given:   Medications   vancomycin 1500 mg in 0.9% NaCl 250 ml intermittent infusion 1,500 mg (1,500 mg Intravenous Given 9/24/20 2331)   vancomycin 1500 mg in 0.9% NaCl 250 ml intermittent infusion 1,500 mg (has no administration in time range)   bictegravir-emtricitabine-tenofovir (BIKTARVY) -25 MG per tablet 1 tablet (has no administration in time range)   famotidine (PEPCID) tablet 20 mg (20 mg Oral Given 9/25/20 0025)   insulin glargine  (LANTUS PEN) injection 35 Units (35 Units Subcutaneous Given 9/25/20 0026)   insulin aspart (NovoLOG) injection (RAPID ACTING) (has no administration in time range)   polyethylene glycol (MIRALAX) Packet 17 g (has no administration in time range)   aspirin (ASA) chewable tablet 81 mg (has no administration in time range)   atorvastatin (LIPITOR) tablet 80 mg (80 mg Oral Given 9/25/20 0024)   glucose gel 15-30 g (has no administration in time range)     Or   dextrose 50 % injection 25-50 mL (has no administration in time range)     Or   glucagon injection 1 mg (has no administration in time range)   insulin aspart (NovoLOG) injection (RAPID ACTING) (has no administration in time range)   insulin aspart (NovoLOG) injection (RAPID ACTING) (0 Units Subcutaneous Not Given 9/25/20 0026)   clindamycin (CLEOCIN) infusion 600 mg (has no administration in time range)   heparin ANTICOAGULANT injection 5,000 Units (has no administration in time range)   nicotine Patch in Place (has no administration in time range)   nicotine (NICODERM CQ) 14 MG/24HR 24 hr patch 1 patch (has no administration in time range)   0.9% sodium chloride BOLUS (has no administration in time range)   sodium chloride 0.9% infusion (has no administration in time range)   0.9% sodium chloride BOLUS (has no administration in time range)   0.9% sodium chloride BOLUS (0 mLs Intravenous Stopped 9/24/20 2008)   ampicillin-sulbactam (UNASYN) 3 g vial to attach to  mL bag (0 g Intravenous Stopped 9/24/20 2109)   sodium chloride (PF) 0.9% PF flush 60 mL (60 mLs Intravenous Given 9/24/20 2054)   iopamidol (ISOVUE-370) solution 100 mL (100 mLs Intravenous Given 9/24/20 2054)   acetaminophen (TYLENOL) tablet 1,000 mg (1,000 mg Oral Given 9/24/20 2015)       Drips running?  Yes    Home pump  No    Current LDAs  Peripheral IV 09/24/20 Right Hand (Active)   Number of days: 1       Incision/Surgical Site 09/09/20 Inner;Bilateral Rectum (Active)   Number of days: 16        Labs results:   Labs Ordered and Resulted from Time of ED Arrival Up to the Time of Departure from the ED   COMPREHENSIVE METABOLIC PANEL - Abnormal; Notable for the following components:       Result Value    Glucose 202 (*)     All other components within normal limits   CBC WITH PLATELETS DIFFERENTIAL - Abnormal; Notable for the following components:    WBC 12.7 (*)     RBC Count 4.25 (*)     Hematocrit 39.0 (*)     Absolute Neutrophil 9.6 (*)     All other components within normal limits   ROUTINE UA WITH MICROSCOPIC - Abnormal; Notable for the following components:    Glucose Urine 300 (*)     Blood Urine Small (*)     RBC Urine 9 (*)     All other components within normal limits   STREPTOCOCCUS A RAPID SCR W REFLX TO PCR - Abnormal; Notable for the following components:    Streptococcus Group A Rapid Screen Positive (*)     All other components within normal limits   LACTATE FOR SEPSIS PROTOCOL   COVID-19 VIRUS (CORONAVIRUS) BY PCR   DRUG ABUSE SCREEN 6 CHEM DEP URINE ([facility])   SARS-COV-2 (COVID-19) VIRUS RT-PCR   PROCALCITONIN   GLUCOSE MONITOR NURSING POCT   GLUCOSE MONITOR NURSING POCT   GLUCOSE MONITOR NURSING POCT   GLUCOSE MONITOR NURSING POCT   GLUCOSE MONITOR NURSING POCT   PERIPHERAL IV CATHETER   PULSE OXIMETRY NURSING   NURSING DRAW AND HOLD   CARDIAC CONTINUOUS MONITORING   IP CARBOHYDRATE COUNTING BY NURSING   PATIENT EDUCATION   ASSESS FOR HYPOGLYCEMIA SYMPTOMS   NOTIFY PHYSICIAN   BLOOD CULTURE   BLOOD CULTURE   URINE CULTURE AEROBIC BACTERIAL   METHICILLIN RESIST/SENS S. AUREUS PCR   THROAT CULTURE AEROBIC BACTERIAL       Imaging Studies:   Recent Results (from the past 24 hour(s))   Soft tissue neck CT w contrast    Narrative    CT SOFT TISSUE NECK W CONTRAST 9/24/2020 9:02 PM    History:  fever and facial swelling      Comparison:  Cervical spine CT 12/29/2019. Head and neck MRI  4/19/2015.     Technique: Following intravenous administration of nonionic iodinated  contrast medium, thin  section helical CT images were obtained from the  skull base down to the level of the aortic arch.  Axial, coronal and  sagittal reformations were performed with 2-3 mm slice thickness  reconstruction. Images were reviewed in soft tissue, lung and bone  windows.    Contrast: iopamidol (ISOVUE-370) solution 100 mL    Findings:   Evaluation of the mucosal space demonstrates no evident abnormality in  the nasopharynx, oropharynx, hypopharynx or the glottis. The tongue  base appears normal. The major salivary glands appear unremarkable.  The thyroid gland appears normal.    Multiple prominent cervical lymph nodes largest being a left  jugulodigastric node measuring up to 2 cm. The remaining lymph nodes  are not enlarged by size criteria for example left level III 7 mm  lymph node (series 2, image 46). The fascial spaces in the neck are  intact bilaterally. The major vascular structures in the neck appear  unremarkable. Mild enlargement of the adenoid tonsils.    Evaluation of the osseous structures demonstrate no worrisome lytic or  sclerotic lesion. No overt spinal canal or neuroforaminal stenosis.  The visualized paranasal sinuses are clear. Opacification of the  dependent portions of the left mastoid air cells. The right mastoid  air cells are clear.    The visualized lung apices are clear.      Impression    Impression:  Multiple prominent left-sided cervical lymph including a left-sided  jugulodigastric lymph node which measures up to 2 cm. The remaining  cervical lymph nodes are not enlarged by size criteria. Given the  history of HIV and sore throat, these findings may represent a  reactive process. Short interval follow-up is recommended to assess  for resolution.    I have personally reviewed the examination and initial interpretation  and I agree with the findings.    CHARLENE SALAS MD   XR Chest Port 1 View    Impression    IMPRESSION: Low lung volumes without focal consolidation.       Recent vital  "signs:   /71   Pulse 100   Temp 100.2  F (37.9  C) (Oral)   Resp 26   Ht 1.626 m (5' 4\")   Wt 92 kg (202 lb 13.2 oz)   SpO2 97%   BMI 34.81 kg/m      Sheyla Coma Scale Score: 15 (09/24/20 2107)       Cardiac Rhythm: Normal Sinus and Tachycardia  Pt needs tele? Yes  Skin/wound Issues: Possible skin/soft tissue infection    Code Status: Full Code    Pain control: good    Nausea control: pt had none    Abnormal labs/tests/findings requiring intervention: see results    Family present during ED course? No   Family Comments/Social Situation comments:     Tasks needing completion: None    Primitivo Mcfadden, RN  [phone]      "

## 2020-09-25 NOTE — PLAN OF CARE
Admitted pt from the [facility] at 0145. Went to the ED because of sore throat and fever. Has history of DM, HIV, Nicotine addiction. Admitted for Sreptopharyngitis and COVID PUI. Alert and oriented x 4. Up independently at baseline. Standy assist  Due to weakness. Verbalized generalized body ache and sore throat with partial relief from Tylenol Tolerating po intake. Triggered sepsis protocol , LA is 1.7, text pagekevin Posey MD, Dr. Levy ordered another liter IV bolus.. On continuos pulse oximeter and heart rate monitor. O2 sats in the mid to high 90's on room air. Tachycardic. Total of 3 liters bolus given. MRSA swab is negative. Waiting for COVID swab results. Verbalized wanting to speak with  re housing. ID and Social Work consult in place. Latest temp is 99.3

## 2020-09-25 NOTE — H&P
[facility], [place]    History and Physical - Hospitalist Service, [facility]       Date of Admission:  9/24/2020    Assessment & Plan   Andrew Lockwood is a 52 year old male admitted on 9/24/2020. He presents with fever, elevated white cell count tachycardia and strep throat point towards sepsis from strep throat in immunocompromised patient.     Plan     Sore throat    No thrush like lesions seen, no known prior herpes lesions, Ordered hepatitis b/c testing, low suspicion for covid-19 as patient reports no exposure     Positive strep on 9/24/2020, did not improve on one day of amoxacillin oral dosing, Started iv clindamycin as alternative therapy should cover mrsa, anerobes as well.   Blood cultures ordered, ct soft neck negative for abscess     Tachycardia   Likely a signs of sepsis and dehydration   Ordered iv hydration       HIV care   Last known cd4 362 on 8/6/2020 on combination of bictagravir/emtricitabine/tenofovir   History of toxo in the past not on ppx as CD4 cell counts high     Diabetes   Continued insulin based regiment with fingersticks   Started diabetic diet   Started statin and aspirin as patient statin group patient     Nicotine addiction   Will offer nicotine patch   counseled patient about stopping to smoke      Diet: Consistent Carbohydrate Diet 6741-9028 Calories: Moderate Consistent CHO (4-6 CHO units/meal)    DVT Prophylaxis: Heparin SQ  Potts Catheter: not present  Code Status: full code   Rule Out COVID-19 Handoff:  Andrew is a LOW SUSPICION PUI.  Follow these instructions:    If COVID test positive -> continue isolation precautions    If COVID test negative -> discontinue COVID-specific isolation precautions       Disposition Plan   Expected discharge: 2 - 3 days, recommended to prior living arrangement once antibiotic plan established.  Entered: Evelio Levy MD 09/25/2020, 12:07 AM     The patient's care was discussed with the Patient.    Evelio Levy  MD  [facility], [place]  Pager: [phone]  Please see sticky note for cross cover information  ______________________________________________________________________    Chief Complaint   Sore throat not improving on oral amoxacillin     History is obtained from the patient    History of Present Illness   Andrew Lockwood is a 52 year old male who with pmh of HIV on haart, dm, anxiety, prior h/o substance abuse, nicotine addiction presented with sore throat, He presented earlier with similar symptoms to the ER and had some tooth pain, His symptoms have come on gradually and progressed over the course of the last few days, Patient mentions the sore throat is like an achy pain in his throat rated 6/10 in maximal intensity. The pain tends to wax and wain in nature.      After his earlier visit to the ER, he was given oral amoxacillin and send him. Patient reports taking one dose and went about his day but started feeling feverish and had chills. He was not feeling nay better by the end of the day and decided to come to the hospital to be evaluated.     Review of Systems    The 10 point Review of Systems is negative other than noted in the HPI or here.     Past Medical History    I have reviewed this patient's medical history and updated it with pertinent information if needed.   Past Medical History:   Diagnosis Date     AIDS (H)      Allergic rhinitis due to other allergen     DNS     Anal dysplasia      Chronic abdominal pain      CNS toxoplasmosis (H)      Diabetes type 2, controlled (H)      GERD (gastroesophageal reflux disease)      History of seizure      History of substance abuse (H)      HIV (human immunodeficiency virus infection) (H)      HLD (hyperlipidemia)      Lung nodules      Periungual wart      PTSD (post-traumatic stress disorder)      Sleep apnea     doesn't use CPAP       Past Surgical History   I have reviewed this patient's surgical history and updated it with pertinent  information if needed.  Past Surgical History:   Procedure Laterality Date     ANOSCOPY N/A 9/9/2020    Procedure: Exam Under Anesthesia, ANOSCOPY, fulgeration of rectal fissures with Rectal Biopsies;  Surgeon: Thanh Lundberg MD;  Location: [facility] OR     C NONSPECIFIC PROCEDURE      right forearm fracture     COLONOSCOPY Left 1/22/2016    Procedure: COMBINED COLONOSCOPY, SINGLE OR MULTIPLE BIOPSY/POLYPECTOMY BY BIOPSY;  Surgeon: Clark Saini MD;  Location: [facility] GI     HC EXPLORE UNDESC TESTIS,INGUIN/SCROTAL       LAPAROSCOPIC APPENDECTOMY N/A 1/31/2018    Procedure: LAPAROSCOPIC APPENDECTOMY;  LAPAROSCOPIC APPENDECTOMY;  Surgeon: Dawn Hlot MD;  Location: [facility] OR     LAPAROSCOPY DIAGNOSTIC (GENERAL) N/A 7/26/2016    Procedure: LAPAROSCOPY DIAGNOSTIC (GENERAL);  Surgeon: Susannah Arriaga MD;  Location: [facility] OR     LAPAROSCOPY DIAGNOSTIC (GENERAL) N/A 4/16/2018    Procedure: LAPAROSCOPY DIAGNOSTIC (GENERAL);  Diagnostic laparoscopy and lysis of adhesions;  Surgeon: Prince Dowling MD;  Location: [facility] OR     OPTICAL TRACKING SYSTEM CRANIOTOMY, EXCISE TUMOR, COMBINED Left 4/10/2015    Procedure: COMBINED OPTICAL TRACKING SYSTEM CRANIOTOMY, EXCISE TUMOR;  Surgeon: Mirlande Colmenares MD;  Location: [facility] OR     REPAIR GAMEKEEPER'S THUMB Right 12/2/2016    Procedure: REPAIR LIGAMENT ULNAR COLLATERAL THUMB (GAMEKEEPER'S);  Surgeon: Evin Zamorano MD;  Location: [facility] OR       Social History   I have reviewed this patient's social history and updated it with pertinent information if needed.  Social History     Tobacco Use     Smoking status: Current Some Day Smoker     Packs/day: 0.50     Years: 40.00     Pack years: 20.00     Types: Cigarettes     Smokeless tobacco: Former User   Substance Use Topics     Alcohol use: No     Alcohol/week: 0.0 standard drinks     Drinks per session: Patient refused     Comment: Last etoh in 2007     Drug use: Not Currently     Types: Marijuana, Methamphetamines      Comment: Rosie 9 months        Family History   I have reviewed this patient's family history and updated it with pertinent information if needed.  Family History   Problem Relation Age of Onset     Diabetes Brother      Diabetes Father      Alzheimer Disease Father      Unknown/Adopted Mother      Diabetes Paternal Grandfather      Cancer No family hx of         no skin cancer     Skin Cancer No family hx of         no famiy hx of skin cancer     Glaucoma No family hx of      Macular Degeneration No family hx of        Prior to Admission Medications   Prior to Admission Medications   Prescriptions Last Dose Informant Patient Reported? Taking?   NOVOLOG FLEXPEN 100 UNIT/ML soln   No No   Sig: Inject 20 units with each meal, along with sliding scale 1 unit per 25mg/dL over 140 before meals and over 200 at bedtime. Max daily dose 90 units   Nutritional Supplements (GLUCOSE MANAGEMENT) TABS  Self No No   Sig: Use 1-2 tabs for hypoglycemia.   SENNA-docusate sodium (SENNA S) 8.6-50 MG tablet   No No   Sig: Take 1 tablet by mouth 2 times daily Hold for loose stools.   STATIN NOT PRESCRIBED (INTENTIONAL)  Self No No   Sig: Please choose reason not prescribed, below   acetaminophen (TYLENOL) 500 MG tablet   No No   Sig: Take 1 tablet (500 mg) by mouth every 6 hours as needed for mild pain   amoxicillin (AMOXIL) 500 MG capsule   No No   Sig: Take 1 capsule (500 mg) by mouth 3 times daily for 7 days   bictegravir-emtricitabine-tenofovir (BIKTARVY) -25 MG per tablet   No No   Sig: Take 1 tablet by mouth daily   Patient taking differently: Take 1 tablet by mouth every morning    blood glucose (NO BRAND SPECIFIED) lancets standard  Self No No   Sig: Use to test blood sugar four times daily or as directed.   blood glucose (NO BRAND SPECIFIED) test strip   No No   Si strip by In Vitro route 4 times daily (before meals and nightly) Use to test blood sugars 4 times daily or as directed   blood glucose monitoring (NO BRAND  SPECIFIED) meter device kit  Self No No   Sig: Use to test blood sugar 4 times daily or as directed.   docusate sodium (COLACE) 50 MG capsule   No No   Sig: Take 2 capsules (100 mg) by mouth 2 times daily as needed for constipation   famotidine (PEPCID) 20 MG tablet   No No   Sig: Take 1 tablet (20 mg) by mouth 2 times daily   insulin glargine (BASAGLAR KWIKPEN) 100 UNIT/ML pen   No No   Sig: Inject 35 Units Subcutaneous 2 times daily   insulin pen needle (B-D U/F) 31G X 8 MM miscellaneous   No No   Sig: Use 1 pen needles daily or as directed.   insulin pen needle (BD PEN NEEDLE SCOTT 2ND GEN) 32G X 4 MM miscellaneous  Self No No   Sig: Use 3-4 daily.   naproxen (NAPROSYN) 500 MG tablet   No No   Sig: Take 1 tablet (500 mg) by mouth 2 times daily (with meals)   naproxen (NAPROSYN) 500 MG tablet   No No   Sig: Take 1 tablet (500 mg) by mouth 2 times daily (with meals) for 15 days   polyethylene glycol (MIRALAX) powder  Self No No   Sig: Take 17 g (1 capful) by mouth daily as needed for constipation   psyllium (METAMUCIL SMOOTH TEXTURE) 28 % packet   No No   Sig: Use one packet in 8oz water daily.   sennosides (SENOKOT) 8.6 MG tablet   No No   Sig: Take 1 tablet by mouth 2 times daily as needed for constipation      Facility-Administered Medications: None     Allergies   Allergies   Allergen Reactions     Metformin      Abdominal pain     Milk [Lac Bovis] Hives     Dulaglutide Rash       Physical Exam   Vital Signs: Temp: 100.2  F (37.9  C) Temp src: Oral BP: 109/71 Pulse: 100   Resp: 26 SpO2: 97 % O2 Device: None (Room air)    Weight: 202 lbs 13.17 oz    Constitutional: awake, alert, cooperative, no apparent distress, and appears stated age, fatigued and mild distress  Eyes: Lids and lashes normal, pupils equal, round and reactive to light, extra ocular muscles intact, sclera clear, conjunctiva normal  ENT: Normocephalic, without obvious abnormality, atraumatic, sinuses nontender on palpation, external ears without  lesions, oral pharynx with moist mucous membranes, tonsils without erythema or exudates, gums normal and good dentition.,   Hematologic / Lymphatic: no cervical lymphadenopathy  Respiratory: No increased work of breathing, good air exchange, clear to auscultation bilaterally, no crackles or wheezing  Cardiovascular: Normal apical impulse, regular rate and rhythm, normal S1 and S2, no S3 or S4, and no murmur noted  GI: No scars, normal bowel sounds, soft, non-distended, non-tender, no masses palpated, no hepatosplenomegally  Genitounirinary:   Skin: no bruising or bleeding  Musculoskeletal: There is no redness, warmth, or swelling of the joints.  Full range of motion noted.  Motor strength is 5 out of 5 all extremities bilaterally.  Tone is normal.  Neurologic: Awake, alert, oriented to name, place and time.  Cranial nerves II-XII are grossly intact.  Motor is 5 out of 5 bilaterally.  Cerebellar finger to nose, heel to shin intact.  Sensory is intact.  Babinski down going, Romberg negative, and gait is normal.  Neuropsychiatric: General: normal, calm and normal eye contact    Data   Data reviewed today: I reviewed all medications, new labs and imaging results over the last 24 hours. I personally reviewed the ct soft tissue neck  image(s) showing soft tissue swelling.    Recent Labs   Lab 09/24/20 1857   WBC 12.7*   HGB 13.5   MCV 92         POTASSIUM 3.5   CHLORIDE 103   CO2 23   BUN 15   CR 0.91   ANIONGAP 7   LINDA 9.0   *   ALBUMIN 3.6   PROTTOTAL 7.5   BILITOTAL 1.0   ALKPHOS 106   ALT 29   AST 22     Most Recent 3 CBC's:  Recent Labs   Lab Test 09/24/20  1857 08/14/20  1232 07/30/20  1228   WBC 12.7* 10.3 7.1   HGB 13.5 16.0 14.3   MCV 92 93 92    370 296     Most Recent 3 BMP's:  Recent Labs   Lab Test 09/24/20 1857 09/09/20  0744 08/27/20  1451 08/14/20  1232 07/30/20  1228     --   --  137 139   POTASSIUM 3.5 3.7 3.5 3.3* 3.4   CHLORIDE 103  --   --  104 108   CO2 23  --   --   27 27   BUN 15  --   --  12 15   CR 0.91 0.87  --  0.96 0.80   ANIONGAP 7  --   --  6 5   LINDA 9.0  --   --  9.6 9.3   *  --   --  79 94     Most Recent 2 LFT's:  Recent Labs   Lab Test 09/24/20  1857 08/14/20  1232   AST 22 31   ALT 29 27   ALKPHOS 106 102   BILITOTAL 1.0 1.4*     Recent Results (from the past 24 hour(s))   Soft tissue neck CT w contrast    Narrative    CT SOFT TISSUE NECK W CONTRAST 9/24/2020 9:02 PM    History:  fever and facial swelling      Comparison:  Cervical spine CT 12/29/2019. Head and neck MRI  4/19/2015.     Technique: Following intravenous administration of nonionic iodinated  contrast medium, thin section helical CT images were obtained from the  skull base down to the level of the aortic arch.  Axial, coronal and  sagittal reformations were performed with 2-3 mm slice thickness  reconstruction. Images were reviewed in soft tissue, lung and bone  windows.    Contrast: iopamidol (ISOVUE-370) solution 100 mL    Findings:   Evaluation of the mucosal space demonstrates no evident abnormality in  the nasopharynx, oropharynx, hypopharynx or the glottis. The tongue  base appears normal. The major salivary glands appear unremarkable.  The thyroid gland appears normal.    Multiple prominent cervical lymph nodes largest being a left  jugulodigastric node measuring up to 2 cm. The remaining lymph nodes  are not enlarged by size criteria for example left level III 7 mm  lymph node (series 2, image 46). The fascial spaces in the neck are  intact bilaterally. The major vascular structures in the neck appear  unremarkable. Mild enlargement of the adenoid tonsils.    Evaluation of the osseous structures demonstrate no worrisome lytic or  sclerotic lesion. No overt spinal canal or neuroforaminal stenosis.  The visualized paranasal sinuses are clear. Opacification of the  dependent portions of the left mastoid air cells. The right mastoid  air cells are clear.    The visualized lung apices are  clear.      Impression    Impression:  Multiple prominent left-sided cervical lymph including a left-sided  jugulodigastric lymph node which measures up to 2 cm. The remaining  cervical lymph nodes are not enlarged by size criteria. Given the  history of HIV and sore throat, these findings may represent a  reactive process. Short interval follow-up is recommended to assess  for resolution.    I have personally reviewed the examination and initial interpretation  and I agree with the findings.    CHARLENE SALAS MD   XR Chest Port 1 View    Impression    IMPRESSION: Low lung volumes without focal consolidation.

## 2020-09-25 NOTE — PROGRESS NOTES
"    [facility] Progress Note - Hospitalist Service, Gold 6       Date of Admission:  9/24/2020  Assessment & Plan       Andrew Lockwood is a 51 yo with a PMH of HIV/AIDS on HAART therapy, DM2, CNS toxoplasmosis, seizures, and HLD presenting with fevers, sore throat, myalgias, and dental pain. He is admitted for COVID 19 rule out and further infectious work up.     Sepsis 2/2 Streptococcus Group A Pharyngitis - Patient presented to the ED with above fevers, sore throat and dental pain.  Recently started on amoxicillin for a presumed dental infection. CT Soft Tissue Neck 9/24 was significant for \"multiple prominent left-sided cervical lymph including a left-sided jugulodigastric lymph node which measures up to 2 cm. The remaining cervical lymph nodes are not enlarged by size criteria\"  Due to immunocompromised status, patient was initially started on IV Unasyn and Vancomycin for broad spectrum coverage.  His throat swab returned positive for strep A. IV antibiotics de-escalated to IV clindamycin. Blood cultures from 9/24 are negative.   -Continue IV clindamycin for now  -COVID 19 result pending   -sore throat lozenges PRN  -Infectious Disease consulted.     HIV - Follows with Dr. Prieto of Infectious Disease. CD4 count on 8/6 was 362.   -Continue Biktarvy daily    DM2 - Follows with Endocrinology as an outpatient. Managed with Lantus 35 units BID, insulin aspart 20 units with meals, and a sliding scale. Hgb A1C today is 8.2  -Continue Lantus 35 units BID  -Insulin aspart 20units TID with meals  -Medium resistance insulin sliding scale   -Carb controlled diet.     Normocytic Anemia - Hgb today 11.9, previously 13.5. Suspect dilutional in origin as he received 4Ls NS in ED. No signs/symptoms of active bleeding  -Repeat CBC tomorrow AM  -If still anemic tomorrow, will add on iron studies, folate and vitamin B12    Hx of Polysubstance abuse - Currently lives at [facility] [place], operated by [facility], which is a sober house for the past two years.  History of methamphetamine use.           Diet: Consistent Carbohydrate Diet 3263-8286 Calories: Moderate Consistent CHO (4-6 CHO units/meal)    DVT Prophylaxis: Heparin subcutaneous BID  Potts Catheter: not present  Code Status: Full Code    Rule Out COVID-19 Handoff:  Andrew is a LOW SUSPICION PUI.  Follow these instructions:    If COVID test positive -> continue isolation precautions    If COVID test negative -> discontinue COVID-specific isolation precautions       Disposition Plan   Expected discharge: Tomorrow, recommended to prior living arrangement once antibiotic plan established.  Entered: Arabella Jovel PA-C 09/25/2020, 9:34 AM       The patient's care was discussed with Dr. Tyrone Jovel PA-C  Hospitalist Service, [address], [place]  Pager: [phone]  Please see sticky note for cross cover information  ______________________________________________________________________    Interval History   All overnight events reviewed. Patient seen this AM. Patient states he's feeling better, but still complains of a sore throat and myalgias.  He states he plans to see a dental clinic upon discharge.  He endorses compliance with his medications.  He denies chest pain, palpitations, SOB, cough, nausea, vomiting, abdominal pain, change in bowel or urinary habits.     Data reviewed today: I reviewed all medications, new labs and imaging results over the last 24 hours.     Physical Exam   Vital Signs: Temp: 99.3  F (37.4  C) Temp src: Oral BP: 114/64 Pulse: 100   Resp: 20 SpO2: 99 % O2 Device: None (Room air)    Weight: 203 lbs 14.4 oz    GENERAL: Alert and oriented x 3. NAD. Ambulatory. Cooperative.   HEENT: Anicteric sclera. Mucous membranes moist. NC. AT. EOMI. PERRLA  CV: RRR. S1, S2. No murmurs appreciated.   RESPIRATORY: Effort normal. Lungs CTAB with no wheezing, rales, rhonchi.   GI: Abdomen soft and  non distended with normoactive bowel sounds present in all quadrants. No tenderness, rebound, guarding. No lesions.   NEUROLOGICAL: No focal deficits. Moves all extremities.  CN 2-12 grossly intact.  EXTREMITIES: No peripheral edema. Intact bilateral pedal pulses.   SKIN: No jaundice. No rashes.  BACK: no CVA tenderness, no spinous tenderness        Data   Results for BREE ROSAS (MRN [id number]) as of 9/25/2020 10:14   Ref. Range 9/25/2020 07:16   Sodium Latest Ref Range: 133 - 144 mmol/L 139   Potassium Latest Ref Range: 3.4 - 5.3 mmol/L 3.4   Chloride Latest Ref Range: 94 - 109 mmol/L 110 (H)   Carbon Dioxide Latest Ref Range: 20 - 32 mmol/L 22   Urea Nitrogen Latest Ref Range: 7 - 30 mg/dL 9   Creatinine Latest Ref Range: 0.66 - 1.25 mg/dL 0.89   GFR Estimate Latest Ref Range: >60 mL/min/1.73_m2 >90   GFR Estimate If Black Latest Ref Range: >60 mL/min/1.73_m2 >90   Calcium Latest Ref Range: 8.5 - 10.1 mg/dL 7.8 (L)   Anion Gap Latest Ref Range: 3 - 14 mmol/L 7   Albumin Latest Ref Range: 3.4 - 5.0 g/dL 2.9 (L)   Protein Total Latest Ref Range: 6.8 - 8.8 g/dL 6.1 (L)   Bilirubin Total Latest Ref Range: 0.2 - 1.3 mg/dL 1.1   Alkaline Phosphatase Latest Ref Range: 40 - 150 U/L 88   ALT Latest Ref Range: 0 - 70 U/L 26   AST Latest Ref Range: 0 - 45 U/L 28   Hemoglobin A1C Latest Ref Range: 0 - 5.6 % 8.2 (H)   Cholesterol Latest Ref Range: <200 mg/dL 134   HDL Cholesterol Latest Ref Range: >39 mg/dL 31 (L)   LDL Cholesterol Calculated Latest Ref Range: <100 mg/dL 75   Non HDL Cholesterol Latest Ref Range: <130 mg/dL 103   Triglycerides Latest Ref Range: <150 mg/dL 136   TSH Latest Ref Range: 0.40 - 4.00 mU/L 0.87   Glucose Latest Ref Range: 70 - 99 mg/dL 102 (H)   WBC Latest Ref Range: 4.0 - 11.0 10e9/L 10.6   Hemoglobin Latest Ref Range: 13.3 - 17.7 g/dL 11.9 (L)   Hematocrit Latest Ref Range: 40.0 - 53.0 % 35.4 (L)   Platelet Count Latest Ref Range: 150 - 450 10e9/L 255   RBC Count Latest Ref Range: 4.4 - 5.9  10e12/L 3.78 (L)   MCV Latest Ref Range: 78 - 100 fl 94   MCH Latest Ref Range: 26.5 - 33.0 pg 31.5   MCHC Latest Ref Range: 31.5 - 36.5 g/dL 33.6   RDW Latest Ref Range: 10.0 - 15.0 % 13.1   Diff Method Unknown Automated Method   % Neutrophils Latest Units: % 76.6   % Lymphocytes Latest Units: % 13.1   % Monocytes Latest Units: % 8.1   % Eosinophils Latest Units: % 1.1   % Basophils Latest Units: % 0.3   % Immature Granulocytes Latest Units: % 0.8   Nucleated RBCs Latest Ref Range: 0 /100 0   Absolute Neutrophil Latest Ref Range: 1.6 - 8.3 10e9/L 8.2   Absolute Lymphocytes Latest Ref Range: 0.8 - 5.3 10e9/L 1.4   Absolute Monocytes Latest Ref Range: 0.0 - 1.3 10e9/L 0.9   Absolute Eosinophils Latest Ref Range: 0.0 - 0.7 10e9/L 0.1   Absolute Basophils Latest Ref Range: 0.0 - 0.2 10e9/L 0.0   Abs Immature Granulocytes Latest Ref Range: 0 - 0.4 10e9/L 0.1   Absolute Nucleated RBC Unknown 0.0

## 2020-09-25 NOTE — CONSULTS
Social Work Services Progress Note    Hospital Day: 2  Collaborated with:  Patient, Sky Moon (ph [phone], fax [phone])    Data:  Pt is a 52-year-old male admitted to [facility] 9/25/20 for COVID-19 r/o and infectious work up.     Intervention:  SW consult received for discharge planning. Per chart review pt lives at [facility] sober housing. Spoke with pt via phone. Pt requests SW call [facility] and update them that he is in the hospital and discharging tomorrow. Pt states he will take a Lyft for transportation.     Spoke with Geovanna at [facility] (ph [phone]). Geovanna states pt can return tomorrow. She requests discharge orders be faxed to [phone]. Updated pt, pt agreeable with plan.     Assessment:  Pt discharging tomorrow back to [facility]    Plan:    Anticipated Disposition:  Sober housing    Barriers to d/c plan:  Medical stability    Follow Up:  SW to follow for discharge planning    RADHA Reeves, [facility]    [facility]  Pager [phone]  Phone [phone]

## 2020-09-25 NOTE — PHARMACY-VANCOMYCIN DOSING SERVICE
Pharmacy Vancomycin Initial Note  Date of Service 2020  Patient's  1967  52 year old, male    Indication: Skin and Soft Tissue Infection    Current estimated CrCl = Estimated Creatinine Clearance: 94.9 mL/min (based on SCr of 0.91 mg/dL).    Creatinine for last 3 days  2020:  6:57 PM Creatinine 0.91 mg/dL    Recent Vancomycin Level(s) for last 3 days  No results found for requested labs within last 72 hours.      Vancomycin IV Administrations (past 72 hours)      No vancomycin orders with administrations in past 72 hours.                Nephrotoxins and other renal medications (From now, onward)    Start     Dose/Rate Route Frequency Ordered Stop    20  vancomycin 1500 mg in 0.9% NaCl 250 ml intermittent infusion 1,500 mg      1,500 mg  over 90 Minutes Intravenous EVERY 12 HOURS 20 22320 223  vancomycin 1500 mg in 0.9% NaCl 250 ml intermittent infusion 1,500 mg      1,500 mg  over 90 Minutes Intravenous ONCE 20 223            Contrast Orders - past 72 hours (72h ago, onward)    Start     Dose/Rate Route Frequency Ordered Stop    20  iopamidol (ISOVUE-370) solution 100 mL      100 mL Intravenous ONCE 20                Plan:  1.  Start vancomycin  1500 mg IV q12h.   2.  Goal Trough Level: 10-15 mg/L   3.  Pharmacy will check trough levels as appropriate in 1-3 Days.    4. Serum creatinine levels will be ordered daily for the first week of therapy and at least twice weekly for subsequent weeks.    5. [place] method utilized to dose vancomycin therapy: Method 2    Primitivo Ross, PharmD, BCPS

## 2020-09-25 NOTE — PLAN OF CARE
"[id number]: /58 (BP Location: Left arm)   Pulse 104   Temp 100.3  F (37.9  C) (Oral)   Resp 18   Ht 1.626 m (5' 4\")   Wt 92.5 kg (203 lb 14.4 oz)   SpO2 97%   BMI 35.00 kg/m      Reason for Admission: Streptococcal Pharyngitis, Fever, Sore Throat, COVID-19 PUI    VSS on RA except tachycardic and T-max 100.3. A&O x4, sleeping most of shift. C/o generalized body aches and sore throat, given Tylenol x2 and lozenges w/ some relief. Up independently/SBA in room. Voids spontaneously w/ bedside urinal. On CHO diet but poor intake this shift, only wanting juice and pudding. Denies nausea. Last BM 9/23. Skin WDL. 2 PIV, infusing NS @ 125 mL/hr and SL. Pt continues on IV Vanc, Clindamycin switched to PO Penicillin. BG's 102 and 129, no Novolog given. Continue to monitor fevers and follow POC, pt will discharge once clinically stable and abx plan established.     Carrie Marie RN on 9/25/2020 at 2:19 PM        "

## 2020-09-25 NOTE — ED PROVIDER NOTES
ED Provider Note  [facility]      History     Chief Complaint   Patient presents with     Pharyngitis     Fever     HPI  Andrew Lockwood is a 52 year old male who presents with fever and sore throat. He was seen in the ED last night with right lower dental pain. He was discharged on oral amoxacillin. This afternoon he developed fever, sore throat and chills. He has a history of HIV/AIDS, type 2 DM, substance abuse, chronic abdominal and anal pain, past CNS toxoplasmosis, seizures and hyperlipidemia. He was in the ED early this morning with right lower dental pain. He states the dental pain has gone away, but he has developed bilateral pain below the angle of the mandible. He developed high fever with chills this afternoon. He has mild diffuse headache with the fever. He has no neck stiffness or photophobia. He denies cough, sputum, shortness of breath, nausea, vomiting or abdominal pain. He has no dysuria. He is not aware of any Covid-19 exposures. He had a negative Covid test about 10 days ago.      Past Medical History:   Diagnosis Date     AIDS (H)      Allergic rhinitis due to other allergen     DNS     Anal dysplasia      Chronic abdominal pain      CNS toxoplasmosis (H)      Diabetes type 2, controlled (H)      GERD (gastroesophageal reflux disease)      History of seizure      History of substance abuse (H)      HIV (human immunodeficiency virus infection) (H)      HLD (hyperlipidemia)      Lung nodules      Periungual wart      PTSD (post-traumatic stress disorder)      Sleep apnea     doesn't use CPAP       Past Surgical History:   Procedure Laterality Date     ANOSCOPY N/A 9/9/2020    Procedure: Exam Under Anesthesia, ANOSCOPY, fulgeration of rectal fissures with Rectal Biopsies;  Surgeon: Thanh Lundberg MD;  Location: [facility] OR     C NONSPECIFIC PROCEDURE      right forearm fracture     COLONOSCOPY Left 1/22/2016    Procedure: COMBINED COLONOSCOPY, SINGLE OR MULTIPLE  BIOPSY/POLYPECTOMY BY BIOPSY;  Surgeon: Clark Saini MD;  Location: [facility] GI     HC EXPLORE UNDESC TESTIS,INGUIN/SCROTAL       LAPAROSCOPIC APPENDECTOMY N/A 1/31/2018    Procedure: LAPAROSCOPIC APPENDECTOMY;  LAPAROSCOPIC APPENDECTOMY;  Surgeon: Dawn Holt MD;  Location: [facility] OR     LAPAROSCOPY DIAGNOSTIC (GENERAL) N/A 7/26/2016    Procedure: LAPAROSCOPY DIAGNOSTIC (GENERAL);  Surgeon: Susannah Arriaga MD;  Location: [facility] OR     LAPAROSCOPY DIAGNOSTIC (GENERAL) N/A 4/16/2018    Procedure: LAPAROSCOPY DIAGNOSTIC (GENERAL);  Diagnostic laparoscopy and lysis of adhesions;  Surgeon: Prince Dowling MD;  Location: [facility] OR     OPTICAL TRACKING SYSTEM CRANIOTOMY, EXCISE TUMOR, COMBINED Left 4/10/2015    Procedure: COMBINED OPTICAL TRACKING SYSTEM CRANIOTOMY, EXCISE TUMOR;  Surgeon: Mirlande Colmenares MD;  Location: [facility] OR     REPAIR GAMEKEEPER'S THUMB Right 12/2/2016    Procedure: REPAIR LIGAMENT ULNAR COLLATERAL THUMB (GAMEKEEPER'S);  Surgeon: Evin Zamorano MD;  Location: [facility] OR       Family History   Problem Relation Age of Onset     Diabetes Brother      Diabetes Father      Alzheimer Disease Father      Unknown/Adopted Mother      Diabetes Paternal Grandfather      Cancer No family hx of         no skin cancer     Skin Cancer No family hx of         no famiy hx of skin cancer     Glaucoma No family hx of      Macular Degeneration No family hx of        Social History     Tobacco Use     Smoking status: Current Some Day Smoker     Packs/day: 0.50     Years: 40.00     Pack years: 20.00     Types: Cigarettes     Smokeless tobacco: Former User   Substance Use Topics     Alcohol use: No     Alcohol/week: 0.0 standard drinks     Drinks per session: Patient refused     Comment: Last etoh in 2007          Review of Systems   Constitutional: Positive for chills and fever.   HENT: Positive for sore throat. Negative for congestion, ear pain and rhinorrhea.    Eyes: Negative for photophobia and  "visual disturbance.   Respiratory: Negative for cough, shortness of breath and wheezing.    Cardiovascular: Negative for chest pain and leg swelling.   Gastrointestinal: Negative for abdominal pain, diarrhea, nausea and vomiting.   Genitourinary: Negative for dysuria.   Musculoskeletal: Negative for back pain and neck pain.   Skin: Negative for rash.   Neurological: Negative for weakness, light-headedness, numbness and headaches.   Hematological: Negative for adenopathy.   Psychiatric/Behavioral: Negative for confusion.         Physical Exam   BP: (!) 140/73  Pulse: 131  Temp: 103.1  F (39.5  C)  Resp: 26  Height: 162.6 cm (5' 4\")  Weight: 92 kg (202 lb 13.2 oz)  SpO2: 95 %  Lying Orthostatic BP: 72/58  Lying Orthostatic Pulse: 104 bpm  Sitting Orthostatic BP: 118/108  Sitting Orthostatic Pulse: 121 bpm  Physical Exam  Vitals signs and nursing note reviewed.   Constitutional:       Appearance: He is well-developed.   HENT:      Head: Normocephalic and atraumatic.      Right Ear: Tympanic membrane normal.      Left Ear: Tympanic membrane normal.      Mouth/Throat:      Mouth: Mucous membranes are moist.      Pharynx: Uvula midline. No oropharyngeal exudate or posterior oropharyngeal erythema.      Tonsils: No tonsillar exudate.      Comments: Tender submandibular glands bilaterally L>R  Neck:      Musculoskeletal: Normal range of motion.   Pulmonary:      Effort: Pulmonary effort is normal.      Breath sounds: Normal breath sounds.   Abdominal:      Tenderness: There is no abdominal tenderness.   Lymphadenopathy:      Cervical: Cervical adenopathy present.   Skin:     General: Skin is warm and dry.   Neurological:      General: No focal deficit present.      Mental Status: He is alert and oriented to person, place, and time.   Psychiatric:         Mood and Affect: Mood normal.         Behavior: Behavior normal.         ED Course      Procedures        Labs/Imaging    Results for orders placed or performed during the " hospital encounter of 09/24/20 (from the past 24 hour(s))   Comprehensive metabolic panel   Result Value Ref Range    Sodium 133 133 - 144 mmol/L    Potassium 3.5 3.4 - 5.3 mmol/L    Chloride 103 94 - 109 mmol/L    Carbon Dioxide 23 20 - 32 mmol/L    Anion Gap 7 3 - 14 mmol/L    Glucose 202 (H) 70 - 99 mg/dL    Urea Nitrogen 15 7 - 30 mg/dL    Creatinine 0.91 0.66 - 1.25 mg/dL    GFR Estimate >90 >60 mL/min/[1.73_m2]    GFR Estimate If Black >90 >60 mL/min/[1.73_m2]    Calcium 9.0 8.5 - 10.1 mg/dL    Bilirubin Total 1.0 0.2 - 1.3 mg/dL    Albumin 3.6 3.4 - 5.0 g/dL    Protein Total 7.5 6.8 - 8.8 g/dL    Alkaline Phosphatase 106 40 - 150 U/L    ALT 29 0 - 70 U/L    AST 22 0 - 45 U/L   CBC with platelets differential   Result Value Ref Range    WBC 12.7 (H) 4.0 - 11.0 10e9/L    RBC Count 4.25 (L) 4.4 - 5.9 10e12/L    Hemoglobin 13.5 13.3 - 17.7 g/dL    Hematocrit 39.0 (L) 40.0 - 53.0 %    MCV 92 78 - 100 fl    MCH 31.8 26.5 - 33.0 pg    MCHC 34.6 31.5 - 36.5 g/dL    RDW 12.9 10.0 - 15.0 %    Platelet Count 310 150 - 450 10e9/L    Diff Method Automated Method     % Neutrophils 75.3 %    % Lymphocytes 14.4 %    % Monocytes 7.2 %    % Eosinophils 2.0 %    % Basophils 0.4 %    % Immature Granulocytes 0.7 %    Nucleated RBCs 0 0 /100    Absolute Neutrophil 9.6 (H) 1.6 - 8.3 10e9/L    Absolute Lymphocytes 1.8 0.8 - 5.3 10e9/L    Absolute Monocytes 0.9 0.0 - 1.3 10e9/L    Absolute Eosinophils 0.3 0.0 - 0.7 10e9/L    Absolute Basophils 0.1 0.0 - 0.2 10e9/L    Abs Immature Granulocytes 0.1 0 - 0.4 10e9/L    Absolute Nucleated RBC 0.0    Lactate for Sepsis Protocol   Result Value Ref Range    Lactate for Sepsis Protocol 1.8 0.7 - 2.0 mmol/L   Blood culture    Specimen: Hand, Right; Blood    Right Hand   Result Value Ref Range    Specimen Description Blood Right Hand     Culture Micro PENDING    Procalcitonin   Result Value Ref Range    Procalcitonin <0.05 ng/ml   Blood culture    Specimen: Hand, Left; Blood    Left Hand    Result Value Ref Range    Specimen Description Blood Left Hand     Culture Micro PENDING    Urine Culture Aerobic Bacterial    Specimen: Urine Midstream; Midstream Urine   Result Value Ref Range    Specimen Description Midstream Urine     Special Requests Specimen received in preservative     Culture Micro PENDING    UA with Microscopic   Result Value Ref Range    Color Urine Yellow     Appearance Urine Clear     Glucose Urine 300 (A) NEG^Negative mg/dL    Bilirubin Urine Negative NEG^Negative    Ketones Urine Negative NEG^Negative mg/dL    Specific Gravity Urine 1.018 1.003 - 1.035    Blood Urine Small (A) NEG^Negative    pH Urine 6.5 5.0 - 7.0 pH    Protein Albumin Urine Negative NEG^Negative mg/dL    Urobilinogen mg/dL Normal 0.0 - 2.0 mg/dL    Nitrite Urine Negative NEG^Negative    Leukocyte Esterase Urine Negative NEG^Negative    Source Midstream Urine     WBC Urine 0 0 - 5 /HPF    RBC Urine 9 (H) 0 - 2 /HPF   Drug abuse screen 6 urine (chem dep)   Result Value Ref Range    Amphetamine Qual Urine Negative NEG^Negative    Barbiturates Qual Urine Negative NEG^Negative    Benzodiazepine Qual Urine Negative NEG^Negative    Cannabinoids Qual Urine Negative NEG^Negative    Cocaine Qual Urine Negative NEG^Negative    Ethanol Qual Urine Negative NEG^Negative    Opiates Qualitative Urine Negative NEG^Negative   Soft tissue neck CT w contrast    Narrative    CT SOFT TISSUE NECK W CONTRAST 9/24/2020 9:02 PM    History:  fever and facial swelling      Comparison:  Cervical spine CT 12/29/2019. Head and neck MRI  4/19/2015.     Technique: Following intravenous administration of nonionic iodinated  contrast medium, thin section helical CT images were obtained from the  skull base down to the level of the aortic arch.  Axial, coronal and  sagittal reformations were performed with 2-3 mm slice thickness  reconstruction. Images were reviewed in soft tissue, lung and bone  windows.    Contrast: iopamidol (ISOVUE-370) solution  100 mL    Findings:   Evaluation of the mucosal space demonstrates no evident abnormality in  the nasopharynx, oropharynx, hypopharynx or the glottis. The tongue  base appears normal. The major salivary glands appear unremarkable.  The thyroid gland appears normal.    Multiple prominent cervical lymph nodes largest being a left  jugulodigastric node measuring up to 2 cm. The remaining lymph nodes  are not enlarged by size criteria for example left level III 7 mm  lymph node (series 2, image 46). The fascial spaces in the neck are  intact bilaterally. The major vascular structures in the neck appear  unremarkable. Mild enlargement of the adenoid tonsils.    Evaluation of the osseous structures demonstrate no worrisome lytic or  sclerotic lesion. No overt spinal canal or neuroforaminal stenosis.  The visualized paranasal sinuses are clear. Opacification of the  dependent portions of the left mastoid air cells. The right mastoid  air cells are clear.    The visualized lung apices are clear.      Impression    Impression:  Multiple prominent left-sided cervical lymph including a left-sided  jugulodigastric lymph node which measures up to 2 cm. The remaining  cervical lymph nodes are not enlarged by size criteria. Given the  history of HIV and sore throat, these findings may represent a  reactive process. Short interval follow-up is recommended to assess  for resolution.    I have personally reviewed the examination and initial interpretation  and I agree with the findings.    CHARLENE SALAS MD   Symptomatic COVID-19 Virus (Coronavirus) by PCR    Specimen: Nasopharyngeal   Result Value Ref Range    COVID-19 Virus PCR to [facility] - Source Nasopharyngeal     COVID-19 Virus PCR to [facility] - Result       Test received-See reflex to IDDL test SARS CoV2 (COVID-19) Virus RT-PCR   Streptococcus A Rapid Scr w Reflx to PCR    Specimen: Throat   Result Value Ref Range    Strep Specimen Description Throat     Streptococcus Group  A Rapid Screen Positive (A) NEG^Negative   XR Chest Port 1 View    Impression    IMPRESSION: Low lung volumes without focal consolidation.   EKG 12-lead, complete   Result Value Ref Range    Interpretation ECG Click View Image link to view waveform and result      *Note: Due to a large number of results and/or encounters for the requested time period, some results have not been displayed. A complete set of results can be found in Results Review.         Medications   vancomycin 1500 mg in 0.9% NaCl 250 ml intermittent infusion 1,500 mg (has no administration in time range)   bictegravir-emtricitabine-tenofovir (BIKTARVY) -25 MG per tablet 1 tablet (has no administration in time range)   famotidine (PEPCID) tablet 20 mg (20 mg Oral Given 9/25/20 0025)   insulin glargine (LANTUS PEN) injection 35 Units (35 Units Subcutaneous Given 9/25/20 0026)   insulin aspart (NovoLOG) injection (RAPID ACTING) (has no administration in time range)   polyethylene glycol (MIRALAX) Packet 17 g (has no administration in time range)   aspirin (ASA) chewable tablet 81 mg (has no administration in time range)   atorvastatin (LIPITOR) tablet 80 mg (80 mg Oral Given 9/25/20 0024)   glucose gel 15-30 g (has no administration in time range)     Or   dextrose 50 % injection 25-50 mL (has no administration in time range)     Or   glucagon injection 1 mg (has no administration in time range)   insulin aspart (NovoLOG) injection (RAPID ACTING) (has no administration in time range)   insulin aspart (NovoLOG) injection (RAPID ACTING) (0 Units Subcutaneous Not Given 9/25/20 0026)   clindamycin (CLEOCIN) infusion 600 mg (600 mg Intravenous New Bag 9/25/20 0110)   heparin ANTICOAGULANT injection 5,000 Units (has no administration in time range)   nicotine Patch in Place (has no administration in time range)   nicotine (NICODERM CQ) 14 MG/24HR 24 hr patch 1 patch (has no administration in time range)   0.9% sodium chloride BOLUS (has no  administration in time range)   sodium chloride 0.9% infusion (has no administration in time range)   0.9% sodium chloride BOLUS (1,000 mLs Intravenous New Bag 9/25/20 0109)   0.9% sodium chloride BOLUS (0 mLs Intravenous Stopped 9/24/20 2008)   ampicillin-sulbactam (UNASYN) 3 g vial to attach to  mL bag (0 g Intravenous Stopped 9/24/20 2109)   sodium chloride (PF) 0.9% PF flush 60 mL (60 mLs Intravenous Given 9/24/20 2054)   iopamidol (ISOVUE-370) solution 100 mL (100 mLs Intravenous Given 9/24/20 2054)   acetaminophen (TYLENOL) tablet 1,000 mg (1,000 mg Oral Given 9/24/20 2015)   vancomycin 1500 mg in 0.9% NaCl 250 ml intermittent infusion 1,500 mg (1,500 mg Intravenous Given 9/24/20 2331)        Assessments & Plan (with Medical Decision Making)   Impression:  Middle aged male with a history of type 2 DM, AIDS with past CNS toxoplasmosis, most recent CD4 count 360 on antiviral, history of GERD, anal dysplasia, HTN, and substance abuse. He presents with 1 day of right lower jaw pain and onset of high fever, chills and tachycardia. He has mild swelling and tenderness in both submandibular glands on exam. He has no evidence of parapharyngeal or neck abscess on CT. He had fever of 103 and tachycardia on arrival. He is not hypotensive and does not appear severely ill.  Lactate is normal. The cause of his fever may be odontogenic, though I  More suspect he has acute sialadenitis on exam. He has no clear symptoms of other infectious process. Covid-19 PCR is pending. He was treated with IV fluid bolus and started on IV Unasyn and Vancomycin. We will check nasal MRSA PCR. He has had MRSA infection in the past. Fever and tachycardia are improving after IV fluids and antibiotic.    I have reviewed the nursing notes. I have reviewed the findings, diagnosis, plan and need for follow up with the patient.    New Prescriptions    No medications on file       Final diagnoses:   Sialadenitis   High fever   Sepsis without  acute organ dysfunction, due to unspecified organism (H)   Streptococcal pharyngitis       --  Zain Cobos  [facility], [place], EMERGENCY DEPARTMENT  9/24/2020     Zain Cobos MD  09/25/20 0138

## 2020-09-25 NOTE — CONSULTS
GENERAL ID SERVICE CONSULTATION     Patient:  Andrew Lockwood   Date of birth 11/27/1967, Medical record number [id number]  Date of Visit:  09/25/2020  Date of Admission: 9/24/2020  Consult Requester:Santana Hansen*          Assessment and Recommendations:   ASSESSMENT:  1. Streptococcal pharyngitis  2. Fever  3. Leukocytosis    DISCUSSION:   Andrew Lockwood is a 52-year-old male with PMH of HIV on HAART, DM, hx of MRSA, anxiety who presented to the emergency department on 9/24 with tooth pain.  Was discharged on amoxicillin and return to the emergency department later that night with sore throat. He reports he took one dose of amoxicillin.  He triggered SIRS alert with T-max of 103.1, WBC 12.7, and . Lactate was w/in normal range. Pt was given IVF and abx changed to IV clindamycin. Rapid strep A test positive. CT neck showing enlarged adenoid tonsils and prominent cervical lymph nodes, otherwise normal and negative for dental abscess.    RECOMMENDATION:  1. Given that pt only received one dose of amoxicillin before returning to ED, would recommend continuing treatment with penicillin V 500mg TID for 10 days  2. Discontinue clindamycin  3. Follow blood and urine cultures    Thank you for this consult. ID will continue to follow.     Patient was discussed with Dr. Cordoba.     Mao Hayes, AVELINA  Infectious Diseases, PGY-1    ________________________________________________________________    Consult Question:.  Admission Diagnosis: Streptococcal pharyngitis [J02.0]  Human immunodeficiency virus I infection (H) [B20]  Sialadenitis [K11.20]  High fever [R50.9]  Sepsis without acute organ dysfunction, due to unspecified organism (H) [A41.9]         History of Present Illness:   Andrew Lockwood is a 52-year-old male with PMH of HIV on HAART, DM, hx of MRSA, anxiety who presented to the emergency department on 9/24 with tooth pain.  Was discharged on amoxicillin and return to the emergency department later that  night with sore throat. He reports he took one dose of amoxicillin.  He triggered SIRS alert with T-max of 103.1, WBC 12.7, and . Lactate was w/in normal range. Pt was given IVF and abx changed to IV clindamycin. Rapid strep A test positive. CT neck showing enlarged adenoid tonsils and prominent cervical lymph nodes, otherwise normal and negative for dental abscess.               Review of Systems:   Per chart review:   CONSTITUTIONAL:  Positive for fever  EYES: negative for icterus  ENT:  Endorsing sore throat that waxes and wanes. Endorses lower incisor tooth pain.  RESPIRATORY:  negative for cough with sputum and dyspnea  CARDIOVASCULAR:  negative for chest pain, dyspnea  GASTROINTESTINAL:  negative for nausea, vomiting, diarrhea and constipation  GENITOURINARY:  negative for dysuria  HEME:  No easy bruising  INTEGUMENT:  negative for rash and pruritus  NEURO:  Negative for headache         Past Medical History:     Past Medical History:   Diagnosis Date     AIDS (H)      Allergic rhinitis due to other allergen     DNS     Anal dysplasia      Chronic abdominal pain      CNS toxoplasmosis (H)      Diabetes type 2, controlled (H)      GERD (gastroesophageal reflux disease)      History of seizure      History of substance abuse (H)      HIV (human immunodeficiency virus infection) (H)      HLD (hyperlipidemia)      Lung nodules      Periungual wart      PTSD (post-traumatic stress disorder)      Sleep apnea     doesn't use CPAP            Past Surgical History:     Past Surgical History:   Procedure Laterality Date     ANOSCOPY N/A 9/9/2020    Procedure: Exam Under Anesthesia, ANOSCOPY, fulgeration of rectal fissures with Rectal Biopsies;  Surgeon: Thanh Lundberg MD;  Location:  OR      NONSPECIFIC PROCEDURE      right forearm fracture     COLONOSCOPY Left 1/22/2016    Procedure: COMBINED COLONOSCOPY, SINGLE OR MULTIPLE BIOPSY/POLYPECTOMY BY BIOPSY;  Surgeon: Clark Saini MD;  Location: [facility]      HC EXPLORE UNDESC TESTIS,INGUIN/SCROTAL       LAPAROSCOPIC APPENDECTOMY N/A 1/31/2018    Procedure: LAPAROSCOPIC APPENDECTOMY;  LAPAROSCOPIC APPENDECTOMY;  Surgeon: Dawn Holt MD;  Location: [facility]     LAPAROSCOPY DIAGNOSTIC (GENERAL) N/A 7/26/2016    Procedure: LAPAROSCOPY DIAGNOSTIC (GENERAL);  Surgeon: Susannah Arriaga MD;  Location: [facility]     LAPAROSCOPY DIAGNOSTIC (GENERAL) N/A 4/16/2018    Procedure: LAPAROSCOPY DIAGNOSTIC (GENERAL);  Diagnostic laparoscopy and lysis of adhesions;  Surgeon: Prince Dowling MD;  Location: [facility]     OPTICAL TRACKING SYSTEM CRANIOTOMY, EXCISE TUMOR, COMBINED Left 4/10/2015    Procedure: COMBINED OPTICAL TRACKING SYSTEM CRANIOTOMY, EXCISE TUMOR;  Surgeon: Mirlande Colmenares MD;  Location: [facility]     REPAIR GAMEKEEPER'S THUMB Right 12/2/2016    Procedure: REPAIR LIGAMENT ULNAR COLLATERAL THUMB (GAMEKEEPER'S);  Surgeon: Evin Zamorano MD;  Location: [facility]            Family History:   Reviewed and non-contributory.   Family History   Problem Relation Age of Onset     Diabetes Brother      Diabetes Father      Alzheimer Disease Father      Unknown/Adopted Mother      Diabetes Paternal Grandfather      Cancer No family hx of         no skin cancer     Skin Cancer No family hx of         no famiy hx of skin cancer     Glaucoma No family hx of      Macular Degeneration No family hx of             Social History:     Social History     Tobacco Use     Smoking status: Current Some Day Smoker     Packs/day: 0.50     Years: 40.00     Pack years: 20.00     Types: Cigarettes     Smokeless tobacco: Former User   Substance Use Topics     Alcohol use: No     Alcohol/week: 0.0 standard drinks     Drinks per session: Patient refused     Comment: Last etoh in 2007     History   Sexual Activity     Sexual activity: Not Currently     Partners: Female, Male     Comment: Last sexual activity 2008            Current Medications:       aspirin  81 mg Oral Daily      "atorvastatin  80 mg Oral QPM     bictegravir-emtricitabine-tenofovir  1 tablet Oral Daily     clindamycin  900 mg Intravenous Q8H     famotidine  20 mg Oral BID     heparin ANTICOAGULANT  5,000 Units Subcutaneous Q12H     insulin aspart  20 Units Subcutaneous TID w/meals     insulin aspart  1-7 Units Subcutaneous TID AC     insulin aspart  1-5 Units Subcutaneous At Bedtime     insulin glargine  35 Units Subcutaneous BID     nicotine  1 patch Transdermal Daily     nicotine   Transdermal Q8H     vancomycin (VANCOCIN) IV  1,500 mg Intravenous Q12H            Allergies:     Allergies   Allergen Reactions     Metformin      Abdominal pain     Milk [Lac Bovis] Hives     Dulaglutide Rash            Physical Exam:   Vitals were reviewed  Patient Vitals for the past 24 hrs:   BP Temp Temp src Pulse Resp SpO2 Height Weight   09/25/20 0551 114/64 99.3  F (37.4  C) Oral 100 20 99 % -- --   09/25/20 0350 114/60 100.9  F (38.3  C) Oral 118 20 98 % -- --   09/25/20 0315 122/64 102  F (38.9  C) Oral 117 20 97 % -- --   09/25/20 0141 (!) 146/86 101.6  F (38.7  C) Oral 116 20 97 % -- 92.5 kg (203 lb 14.4 oz)   09/25/20 0115 116/62 -- -- 102 -- 97 % -- --   09/25/20 0110 -- -- -- 109 -- 97 % -- --   09/24/20 2330 -- 100.2  F (37.9  C) Oral 100 26 97 % -- --   09/24/20 2325 109/71 -- -- -- -- -- -- --   09/24/20 2200 124/79 -- -- 112 -- 95 % -- --   09/24/20 2155 126/81 -- -- 112 -- -- -- --   09/24/20 2110 116/69 -- -- 117 -- 97 % -- --   09/24/20 2108 122/66 -- -- 114 -- 98 % -- --   09/24/20 1859 -- 103.1  F (39.5  C) Oral 131 -- 95 % -- --   09/24/20 1842 (!) 140/73 -- -- -- -- -- 1.626 m (5' 4\") 92 kg (202 lb 13.2 oz)       Physical Examination per primary team:   GENERAL: Alert and oriented x 3. NAD. Ambulatory. Cooperative.   HEENT: Anicteric sclera. Mucous membranes moist. NC. AT. EOMI. PERRLA  CV: RRR. S1, S2. No murmurs appreciated.   RESPIRATORY: Effort normal. Lungs CTAB with no wheezing, rales, rhonchi.   GI: Abdomen soft " and non distended with normoactive bowel sounds present in all quadrants. No tenderness, rebound, guarding. No lesions.   NEUROLOGICAL: No focal deficits. Moves all extremities.  CN 2-12 grossly intact.  EXTREMITIES: No peripheral edema. Intact bilateral pedal pulses.   SKIN: No jaundice. No rashes.  BACK: no CVA tenderness, no spinous tenderness            Laboratory Data:     Inflammatory Markers    Recent Labs   Lab Test 02/26/20  0552 02/25/20  0637 02/14/20  1032 10/08/18  0720 10/06/18  0658 10/05/18  0818 03/06/18  2105 12/28/16  1540  07/07/15  2205 05/25/15  2217   SED  --  21* 18  --   --  23*  --   --   --  25* 37*   CRP 7.9 17.0* 5.0 <2.9 15.0* 14.0* 53.0* <2.9   < > 7.8 5.0    < > = values in this interval not displayed.       Hematology Studies    Recent Labs   Lab Test 09/25/20  0716 09/24/20  1857 08/14/20  1232 07/30/20  1228 07/22/20  0335 06/25/20  0255   WBC 10.6 12.7* 10.3 7.1 7.2 7.4   ANEU 8.2 9.6* 6.3 4.0 4.1 4.2   AEOS 0.1 0.3 0.2 0.2 0.3 0.2   HGB 11.9* 13.5 16.0 14.3 14.1 13.6   MCV 94 92 93 92 92 94    310 370 296 323 334       Metabolic Studies     Recent Labs   Lab Test 09/25/20  0716 09/24/20  1857 09/09/20  0744 08/27/20  1451 08/14/20  1232 07/30/20  1228 07/22/20  0335    133  --   --  137 139 134   POTASSIUM 3.4 3.5 3.7 3.5 3.3* 3.4 3.4   CHLORIDE 110* 103  --   --  104 108 104   CO2 22 23  --   --  27 27 25   BUN 9 15  --   --  12 15 21   CR 0.89 0.91 0.87  --  0.96 0.80 0.74   GFRESTIMATED >90 >90 >90  --  89 >90 >90       Hepatic Studies    Recent Labs   Lab Test 09/25/20  0716 09/24/20  1857 08/14/20  1232 07/30/20  1228 07/22/20  0335 06/25/20  0255   BILITOTAL 1.1 1.0 1.4* 0.7 0.5 0.9   ALKPHOS 88 106 102 95 124 92   ALBUMIN 2.9* 3.6 4.0 3.5 3.3* 3.6   AST 28 22 31 17 26 30   ALT 26 29 27 24 29 27       Microbiology:  Culture Micro   Date Value Ref Range Status   09/24/2020 Culture negative monitoring continues  Preliminary   09/24/2020 No growth after 10 hours   Preliminary   09/24/2020 No growth after 10 hours  Preliminary   02/25/2020 (A)  Final    Moderate growth  Methicillin resistant Staphylococcus aureus (MRSA)     10/26/2019 No growth  Final   10/26/2019 No growth  Final   10/04/2018 (A)  Final    Moderate growth  Staphylococcus aureus  This isolate DOES NOT demonstrate inducible clindamycin resistance in vitro. Clindamycin   is susceptible and could be used when indicated, however, erythromycin is resistant and   should not be used.     02/05/2018 Moderate growth  Escherichia coli   (A)  Final   02/05/2018 Heavy growth  Streptococcus anginosus   (A)  Final   02/05/2018 Heavy growth  Mixed gram positive sushma    Final   02/05/2018 (A)  Final    Heavy growth  Bacteroides fragilis group  Susceptibility testing not routinely done     02/05/2018 (A)  Final    Heavy growth  Bacteroides fragilis  Susceptibility testing not routinely done     02/05/2018 (A)  Final    Plus  Heavy growth  Mixed aerobic and anaerobic sushma     02/05/2018 Canceled, Test credited  Final   02/05/2018 Incorrectly ordered by PCU/Clinic  Final   02/05/2018 Test reordered as correct code  Final   02/05/2018 Culture negative after 4 weeks  Final   02/04/2018 No growth  Final   11/07/2017 No growth  Final   11/07/2017 (A)  Final    Cultured on the 2nd day of incubation:  Staphylococcus capitis  This isolate DOES NOT demonstrate inducible clindamycin resistance in vitro. Clindamycin   is susceptible and could be used when indicated, however, erythromycin is resistant and   should not be used.     11/07/2017   Final    Critical Value/Significant Value, preliminary result only, called to and read back by  Katalina BUI at 1555 on 11/9/17 by MIGUEL.     11/07/2017   Final    (Note)  POSITIVE for Staphylococci other than S.aureus, S.epidermidis and  S.lugdunensis, by Mailsuiteigene multiplex nucleic acid test.  Coagulase-negative staphylococci are the most common venipuncture or  collection associated skin  CONTAMINANTS grown in blood cultures.  Final identification and antimicrobial susceptibility testing will be  verified by standard methods.     Specimen tested with Copyteleigene multiplex, gram-positive blood culture  nucleic acid test for the following targets: Staph aureus, Staph  epidermidis, Staph lugdunensis, other Staph species, Enterococcus  faecalis, Enterococcus faecium, Streptococcus species, S. agalactiae,  S. anginosus grp., S. pneumoniae, S. pyogenes, Listeria sp., mecA  (methicillin resistance) and Ramon/B (vancomycin resistance).    Critical Value/Significant Value called to and read back by Analy Doe RN, @1810 11/9/17.DH.     05/17/2016 No growth  Final   05/10/2016 No growth  Final   05/10/2016 No growth  Final   05/10/2016 No growth  Final   05/10/2016 (A)  Final    On day 2, isolated in broth only: Staphylococcus epidermidis  Critical Value/Significant Value, preliminary result only, called to and read   back by Dr. Marcus Silva on 5/12/16 14:40 CWi  No anaerobes isolated     05/10/2016 Culture negative after 4 weeks  Final   05/10/2016 No Beta Streptococcus isolated  Final   05/09/2016 No growth  Final   05/09/2016 No growth  Final   05/09/2016 No growth  Final   01/26/2016   Final    No Salmonella, Shigella, Campylobacter, E. coli O157, Aeromonas, or Plesiomonas   isolated.     01/18/2016   Final    Culture negative for acid fast bacilli  Assayed at [facility] [address]     01/18/2016   Final    Culture negative for acid fast bacilli  Assayed at [facility] [address]     01/15/2016   Final    The Microbiology Lab does not accept stool specimens for routine culture if the   patient has been hospitalized for 3 days or longer and does not have a   diagnosis of gastroenteritis.     01/13/2016 Test reordered as miscellaneous test on 1/13/16.  Final   01/11/2016   Final    Culture negative for acid fast bacilli  Assayed at [facility] [address]     01/10/2016 No growth after 14 days  Final   01/10/2016 No growth  Final   01/10/2016 No growth  Final   01/09/2016 No growth  Final   01/09/2016 No growth  Final   01/09/2016 No growth  Final   12/25/2015 No growth  Final   12/25/2015 No growth  Final   07/07/2015 No growth  Final   07/07/2015 No growth  Final   07/07/2015 No growth  Final   04/27/2015 No growth  Final   04/26/2015 No growth  Final   04/26/2015 No growth  Final   04/25/2015 No growth  Final   04/25/2015 No growth  Final   04/18/2015 No growth  Final   04/10/2015 (A)  Final    On day 2, isolated in broth only: Coagulase negative Staphylococcus not isolated   or reported on routine culture  Critical Value/Significant Value, preliminary result only, called to and read   back by Maranda Maldonado RN (4/12/15 @ 1329) JR  On day 14, isolated in broth only: Propionibacterium acnes Susceptibilities done   on aerobic culture.     04/10/2015 Culture negative after 4 weeks  Final   04/10/2015 (A)  Final    On day 4, isolated in broth only: Propionibacterium acnes  Critical Value/Significant Value called to and read back by Nena Rainey Rn   at 1414 on 04.15.15 by luiza  Cefotaxime Testing unavailable due to 's backorder.     12/15/2011 No Beta Streptococcus isolated  Final   01/18/2006 No growth  Final       Urine Studies    Recent Labs   Lab Test 09/24/20  2005 06/25/20  0400 05/23/20  2028 04/27/20  1747 02/25/20  1023  02/03/20  1603   LEUKEST Negative Negative Negative Negative Negative   < > Negative   WBCU 0  --  0 1 <1  --  1    < > = values in this interval not displayed.       Vancomycin Levels    Recent Labs   Lab Test 04/14/15  0711   VANCOMYCIN 15.4       Hepatitis B Testing   Recent Labs   Lab Test 04/13/15  0816   HBCAB Nonreactive   HEPBANG Nonreactive   HBCM Nonreactive   A nonreactive result suggests lack of recent exposure to the virus in the   preceding 6 months.     HBEABY  Negative  Reference range: Negative  (Note)  Performed by [email or web address],  [address] [place] [address] [phone]  [email or web address], Nayan Corley MD, Lab. Director     HBEAGN Negative  Reference range: Negative  (Note)  Performed by [email or web address],  [address] [place] [address] [phone]  [email or web address], Nayan Corley MD, Lab. Director       Hepatitis C Testing     Hepatitis C Antibody   Date Value Ref Range Status   02/11/2020 Nonreactive NR^Nonreactive Final     Comment:     Assay performance characteristics have not been established for newborns,   infants, and children     10/31/2019 Nonreactive NR^Nonreactive Final     Comment:     Assay performance characteristics have not been established for newborns,   infants, and children       Respiratory Virus Testing    RSV Rapid Antigen Result   Date Value Ref Range Status   01/31/2018 Positive (A) NEG^Negative Final     Comment:     Test results must be correlated with clinical data. If necessary, results   should be confirmed by a molecular assay or viral culture.

## 2020-09-25 NOTE — PROGRESS NOTES
Reviewed ID recommendations.  As patient only completed one day of his outpatient amoxicillin, they recommend transitioning to an oral course of penicillin v 500mg TID for 10 days.  COVID 19 testing has returned negative as well.     Will monitor patient for fevers overnight with plans for discharge tomorrow AM.     Arabella Jovel PA-C  Hospitalist Service  Pager [phone]

## 2020-09-26 LAB
ALBUMIN SERPL-MCNC: 2.8 G/DL (ref 3.4–5)
ALP SERPL-CCNC: 109 U/L (ref 40–150)
ALT SERPL W P-5'-P-CCNC: 39 U/L (ref 0–70)
ANION GAP SERPL CALCULATED.3IONS-SCNC: 4 MMOL/L (ref 3–14)
AST SERPL W P-5'-P-CCNC: 39 U/L (ref 0–45)
BILIRUB SERPL-MCNC: 1.2 MG/DL (ref 0.2–1.3)
BUN SERPL-MCNC: 10 MG/DL (ref 7–30)
CALCIUM SERPL-MCNC: 8.6 MG/DL (ref 8.5–10.1)
CHLORIDE SERPL-SCNC: 109 MMOL/L (ref 94–109)
CO2 SERPL-SCNC: 25 MMOL/L (ref 20–32)
CREAT SERPL-MCNC: 0.82 MG/DL (ref 0.66–1.25)
ERYTHROCYTE [DISTWIDTH] IN BLOOD BY AUTOMATED COUNT: 13.1 % (ref 10–15)
FERRITIN SERPL-MCNC: 186 NG/ML (ref 26–388)
FOLATE SERPL-MCNC: 24 NG/ML
GFR SERPL CREATININE-BSD FRML MDRD: >90 ML/MIN/{1.73_M2}
GLUCOSE BLDC GLUCOMTR-MCNC: 143 MG/DL (ref 70–99)
GLUCOSE BLDC GLUCOMTR-MCNC: 164 MG/DL (ref 70–99)
GLUCOSE BLDC GLUCOMTR-MCNC: 171 MG/DL (ref 70–99)
GLUCOSE BLDC GLUCOMTR-MCNC: 193 MG/DL (ref 70–99)
GLUCOSE BLDC GLUCOMTR-MCNC: 226 MG/DL (ref 70–99)
GLUCOSE SERPL-MCNC: 157 MG/DL (ref 70–99)
HCT VFR BLD AUTO: 35.8 % (ref 40–53)
HGB BLD-MCNC: 12 G/DL (ref 13.3–17.7)
IRON SATN MFR SERPL: 8 % (ref 15–46)
IRON SERPL-MCNC: 26 UG/DL (ref 35–180)
MCH RBC QN AUTO: 31.3 PG (ref 26.5–33)
MCHC RBC AUTO-ENTMCNC: 33.5 G/DL (ref 31.5–36.5)
MCV RBC AUTO: 93 FL (ref 78–100)
PLATELET # BLD AUTO: 270 10E9/L (ref 150–450)
POTASSIUM SERPL-SCNC: 3.4 MMOL/L (ref 3.4–5.3)
PROT SERPL-MCNC: 6.5 G/DL (ref 6.8–8.8)
RBC # BLD AUTO: 3.84 10E12/L (ref 4.4–5.9)
SODIUM SERPL-SCNC: 138 MMOL/L (ref 133–144)
TIBC SERPL-MCNC: 324 UG/DL (ref 240–430)
VIT B12 SERPL-MCNC: 320 PG/ML (ref 193–986)
WBC # BLD AUTO: 9.9 10E9/L (ref 4–11)

## 2020-09-26 PROCEDURE — 85027 COMPLETE CBC AUTOMATED: CPT | Performed by: PHYSICIAN ASSISTANT

## 2020-09-26 PROCEDURE — 82607 VITAMIN B-12: CPT | Performed by: HOSPITALIST

## 2020-09-26 PROCEDURE — 96372 THER/PROPH/DIAG INJ SC/IM: CPT

## 2020-09-26 PROCEDURE — 00000146 ZZHCL STATISTIC GLUCOSE BY METER IP

## 2020-09-26 PROCEDURE — 87040 BLOOD CULTURE FOR BACTERIA: CPT | Performed by: PHYSICIAN ASSISTANT

## 2020-09-26 PROCEDURE — 36415 COLL VENOUS BLD VENIPUNCTURE: CPT | Performed by: HOSPITALIST

## 2020-09-26 PROCEDURE — G0378 HOSPITAL OBSERVATION PER HR: HCPCS

## 2020-09-26 PROCEDURE — 99232 SBSQ HOSP IP/OBS MODERATE 35: CPT | Performed by: INTERNAL MEDICINE

## 2020-09-26 PROCEDURE — 25000132 ZZH RX MED GY IP 250 OP 250 PS 637: Performed by: STUDENT IN AN ORGANIZED HEALTH CARE EDUCATION/TRAINING PROGRAM

## 2020-09-26 PROCEDURE — 25000132 ZZH RX MED GY IP 250 OP 250 PS 637: Performed by: PHYSICIAN ASSISTANT

## 2020-09-26 PROCEDURE — 83540 ASSAY OF IRON: CPT | Performed by: HOSPITALIST

## 2020-09-26 PROCEDURE — 82728 ASSAY OF FERRITIN: CPT | Performed by: HOSPITALIST

## 2020-09-26 PROCEDURE — 25000128 H RX IP 250 OP 636: Performed by: STUDENT IN AN ORGANIZED HEALTH CARE EDUCATION/TRAINING PROGRAM

## 2020-09-26 PROCEDURE — 12000001 ZZH R&B MED SURG/OB UMMC

## 2020-09-26 PROCEDURE — 99207 ZZC APP CREDIT; MD BILLING SHARED VISIT: CPT | Performed by: PHYSICIAN ASSISTANT

## 2020-09-26 PROCEDURE — 25800030 ZZH RX IP 258 OP 636: Performed by: STUDENT IN AN ORGANIZED HEALTH CARE EDUCATION/TRAINING PROGRAM

## 2020-09-26 PROCEDURE — 83550 IRON BINDING TEST: CPT | Performed by: HOSPITALIST

## 2020-09-26 PROCEDURE — 25000131 ZZH RX MED GY IP 250 OP 636 PS 637: Performed by: STUDENT IN AN ORGANIZED HEALTH CARE EDUCATION/TRAINING PROGRAM

## 2020-09-26 PROCEDURE — 80053 COMPREHEN METABOLIC PANEL: CPT | Performed by: HOSPITALIST

## 2020-09-26 PROCEDURE — 82746 ASSAY OF FOLIC ACID SERUM: CPT | Performed by: HOSPITALIST

## 2020-09-26 PROCEDURE — 40000556 ZZH STATISTIC PERIPHERAL IV START W US GUIDANCE

## 2020-09-26 PROCEDURE — 85027 COMPLETE CBC AUTOMATED: CPT | Performed by: HOSPITALIST

## 2020-09-26 PROCEDURE — 36415 COLL VENOUS BLD VENIPUNCTURE: CPT | Performed by: PHYSICIAN ASSISTANT

## 2020-09-26 RX ORDER — NALOXONE HYDROCHLORIDE 0.4 MG/ML
.1-.4 INJECTION, SOLUTION INTRAMUSCULAR; INTRAVENOUS; SUBCUTANEOUS
Status: DISCONTINUED | OUTPATIENT
Start: 2020-09-26 | End: 2020-09-27 | Stop reason: HOSPADM

## 2020-09-26 RX ORDER — OXYCODONE HYDROCHLORIDE 5 MG/1
5-10 TABLET ORAL EVERY 6 HOURS PRN
Status: DISCONTINUED | OUTPATIENT
Start: 2020-09-26 | End: 2020-09-27 | Stop reason: HOSPADM

## 2020-09-26 RX ADMIN — PENICILLIN V POTASIUM 500 MG: 500 TABLET OROPHARYNGEAL at 22:11

## 2020-09-26 RX ADMIN — BICTEGRAVIR SODIUM, EMTRICITABINE, AND TENOFOVIR ALAFENAMIDE FUMARATE 1 TABLET: 50; 200; 25 TABLET ORAL at 08:43

## 2020-09-26 RX ADMIN — PENICILLIN V POTASIUM 500 MG: 500 TABLET OROPHARYNGEAL at 13:50

## 2020-09-26 RX ADMIN — NICOTINE POLACRILEX 4 MG: 4 GUM, CHEWING ORAL at 11:42

## 2020-09-26 RX ADMIN — HEPARIN SODIUM 5000 UNITS: 5000 INJECTION, SOLUTION INTRAVENOUS; SUBCUTANEOUS at 20:33

## 2020-09-26 RX ADMIN — FAMOTIDINE 20 MG: 20 TABLET ORAL at 08:42

## 2020-09-26 RX ADMIN — NAPROXEN 500 MG: 500 TABLET ORAL at 17:47

## 2020-09-26 RX ADMIN — SODIUM CHLORIDE: 9 INJECTION, SOLUTION INTRAVENOUS at 23:44

## 2020-09-26 RX ADMIN — INSULIN GLARGINE 35 UNITS: 100 INJECTION, SOLUTION SUBCUTANEOUS at 08:42

## 2020-09-26 RX ADMIN — NAPROXEN 500 MG: 500 TABLET ORAL at 08:43

## 2020-09-26 RX ADMIN — PENICILLIN V POTASIUM 500 MG: 500 TABLET OROPHARYNGEAL at 05:38

## 2020-09-26 RX ADMIN — HEPARIN SODIUM 5000 UNITS: 5000 INJECTION, SOLUTION INTRAVENOUS; SUBCUTANEOUS at 08:42

## 2020-09-26 RX ADMIN — INSULIN ASPART 20 UNITS: 100 INJECTION, SOLUTION INTRAVENOUS; SUBCUTANEOUS at 17:46

## 2020-09-26 RX ADMIN — INSULIN GLARGINE 35 UNITS: 100 INJECTION, SOLUTION SUBCUTANEOUS at 20:36

## 2020-09-26 RX ADMIN — ATORVASTATIN CALCIUM 80 MG: 40 TABLET, FILM COATED ORAL at 20:32

## 2020-09-26 RX ADMIN — FAMOTIDINE 20 MG: 20 TABLET ORAL at 20:32

## 2020-09-26 RX ADMIN — ASPIRIN 81 MG CHEWABLE TABLET 81 MG: 81 TABLET CHEWABLE at 08:42

## 2020-09-26 ASSESSMENT — ACTIVITIES OF DAILY LIVING (ADL)
ADLS_ACUITY_SCORE: 13

## 2020-09-26 NOTE — PROGRESS NOTES
"    [facility], [facility] Progress Note - Hospitalist Service, Gold 6       Date of Admission:  9/24/2020  Assessment & Plan       Andrew Lockwood is a 51 yo with a PMH of HIV/AIDS on HAART therapy, DM2, CNS toxoplasmosis, seizures, and HLD presenting with fevers, sore throat, myalgias, and dental pain. He is admitted for COVID 19 rule out and further infectious work up.     Sepsis 2/2 Streptococcus Group A Pharyngitis - Patient presented to the ED with above fevers, sore throat and dental pain.  Recently started on amoxicillin for a presumed dental infection. CT Soft Tissue Neck 9/24 was significant for \"multiple prominent left-sided cervical lymph including a left-sided jugulodigastric lymph node which measures up to 2 cm. The remaining cervical lymph nodes are not enlarged by size criteria\"  Due to immunocompromised status, patient was initially started on IV Unasyn and Vancomycin for broad spectrum coverage.  His throat swab returned positive for strep A. IV antibiotics de-escalated to IV clindamycin. Blood cultures from 9/24 are negative. Per ID recommendations, antibiotics were further de-escalated to oral amoxicillin. Patient developed new fevers overnight 9/25 on oral antibiotics.    -Discussed with ID, continue oral amoxicillin.  Agree with repeat blood cultures.   -COVID 19 negative  -sore throat lozenges PRN  -Appreciate ID recommendations  -Add oxycodone PRN for pain relief as toradol did not help     HIV - Follows with Dr. Prieto of Infectious Disease. CD4 count on 8/6 was 362.   -Continue Biktarvy daily    DM2 - Follows with Endocrinology as an outpatient. Managed with Lantus 35 units BID, insulin aspart 20 units with meals, and a sliding scale. Hgb A1C today is 8.2  -Continue Lantus 35 units BID  -Insulin aspart 20units TID with meals  -Medium resistance insulin sliding scale   -Carb controlled diet.      Normocytic Anemia - Hgb today 12, previously 13.5. Suspect " dilutional in origin as he received 4Ls NS in ED. No signs/symptoms of active bleeding  -add on iron studies, folate and vitamin B12     Hx of Polysubstance abuse - Currently lives at [facility], which is a sober house for the past two years.  History of methamphetamine use.     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  Per chart review, patient has deferred treatment   -Follow up with Infectious Disease and PCP on discharge.        Diet: Consistent Carbohydrate Diet 5816-3113 Calories: Moderate Consistent CHO (4-6 CHO units/meal)    DVT Prophylaxis: Heparin SQ  Potts Catheter: not present  Code Status: Full Code             Expected discharge: Tomorrow, recommended to prior living arrangement once patient remains afebrile on current antibiotic regimen   Entered: Arabella Jovel PA-C 09/26/2020, 8:50 AM       The patient's care was discussed with the Dr. Veronica Jovel PA-C  Hospitalist Service, [address]  Pager: [phone]  Please see sticky note for cross cover information  ______________________________________________________________________    Interval History   All overnight events reviewed. Patient developed new fevers yesterday evening.  Patient states he continues to have throat and neck pain.  He denies dysphagia or dyspnea.  He states he would like to switch to nicotine gum to better control his cravings.  He denies chest pain, palpitations, SOB, cough, nausea, vomiting, abdominal pain, change in bowel or urinary habits.     Data reviewed today: I reviewed all medications, new labs and imaging results over the last 24 hours.    Physical Exam   Vital Signs: Temp: 97.1  F (36.2  C) Temp src: Oral BP: 111/66 Pulse: 92   Resp: 20 SpO2: 98 % O2 Device: None (Room air)    Weight: 203 lbs 14.4 oz     GENERAL: Alert and oriented x 3. NAD. Ambulatory. Cooperative.   HEENT: Anicteric sclera. Mucous  membranes moist. NC. AT. EOMI. PERRLA.  Erythematous oropharynx with exudate. Tenderness to palpation to cervical lymph nodes  CV: RRR. S1, S2. No murmurs appreciated.   RESPIRATORY: Effort normal on RA. Lungs CTAB with no wheezing, rales, rhonchi.   GI: Abdomen soft and non distended with normoactive bowel sounds present in all quadrants. No tenderness, rebound, guarding. No lesions.   NEUROLOGICAL: No focal deficits. Moves all extremities.  CN 2-12 grossly intact.  EXTREMITIES: No peripheral edema. Intact bilateral pedal pulses.   SKIN: No jaundice. No rashes.  BACK: no CVA tenderness, no spinous tenderness        Data   Results for BREE ROSAS (MRN [id number]) as of 9/26/2020 08:54   Ref. Range 9/26/2020 06:43   Sodium Latest Ref Range: 133 - 144 mmol/L 138   Potassium Latest Ref Range: 3.4 - 5.3 mmol/L 3.4   Chloride Latest Ref Range: 94 - 109 mmol/L 109   Carbon Dioxide Latest Ref Range: 20 - 32 mmol/L 25   Urea Nitrogen Latest Ref Range: 7 - 30 mg/dL 10   Creatinine Latest Ref Range: 0.66 - 1.25 mg/dL 0.82   GFR Estimate Latest Ref Range: >60 mL/min/1.73_m2 >90   GFR Estimate If Black Latest Ref Range: >60 mL/min/1.73_m2 >90   Calcium Latest Ref Range: 8.5 - 10.1 mg/dL 8.6   Anion Gap Latest Ref Range: 3 - 14 mmol/L 4   Albumin Latest Ref Range: 3.4 - 5.0 g/dL 2.8 (L)   Protein Total Latest Ref Range: 6.8 - 8.8 g/dL 6.5 (L)   Bilirubin Total Latest Ref Range: 0.2 - 1.3 mg/dL 1.2   Alkaline Phosphatase Latest Ref Range: 40 - 150 U/L 109   ALT Latest Ref Range: 0 - 70 U/L 39   AST Latest Ref Range: 0 - 45 U/L 39   Results for BREE ROSAS (MRN [id number]) as of 9/26/2020 09:47   Ref. Range 9/26/2020 06:43   WBC Latest Ref Range: 4.0 - 11.0 10e9/L 9.9   Hemoglobin Latest Ref Range: 13.3 - 17.7 g/dL 12.0 (L)   Hematocrit Latest Ref Range: 40.0 - 53.0 % 35.8 (L)   Platelet Count Latest Ref Range: 150 - 450 10e9/L 270   RBC Count Latest Ref Range: 4.4 - 5.9 10e12/L 3.84 (L)   MCV Latest Ref Range: 78 - 100 fl  93   MCH Latest Ref Range: 26.5 - 33.0 pg 31.3   MCHC Latest Ref Range: 31.5 - 36.5 g/dL 33.5   RDW Latest Ref Range: 10.0 - 15.0 % 13.1

## 2020-09-26 NOTE — PLAN OF CARE
"Time: 1900-0730    Reason for admission/Dx:   Streptococcal pharyngitis [J02.0]  Human immunodeficiency virus I infection (H) [B20]  Sialadenitis [K11.20]  High fever [R50.9]  Sepsis without acute organ dysfunction, due to unspecified organism (H) [A41.9]     /63 (BP Location: Right arm)   Pulse 111   Temp 98.2  F (36.8  C) (Oral)   Resp 18   Ht 1.626 m (5' 4\")   Wt 92.5 kg (203 lb 14.4 oz)   SpO2 96%   BMI 35.00 kg/m      Shift changes: Tachycardic (100-110s) + fevers T-max 102F, with relief from PO Naproxen + PO Penicillin, otherwise VSS, on RA, however intermittently apneic, during NOC/sleep, suspect/consistent for DANISHA.  Pt notes sore throat, odynophagia, with poor PO intake. GI cocktail and GI cocktail given, with temporary relief. Held insulin, per pt req/refusal, d/t PO intake. Voiding WDL. LBM reported to be 3x days ago, per pt. MIVF maintained.     Plan: Possible discharge today.   "

## 2020-09-26 NOTE — PROGRESS NOTES
"ADDENDUM: 9/26/2020 1459  Writer able to speak with patient and alert him to the update called to [facility] for an anticipated return tomorrow 9/27/20 instead of today. He was thankful for the information and had no further questions.     Deidre GARCIA LICSW  9/26/2020    Searchable on [employer] - search SOCIAL WORK - for text paging options    SATURDAY AND SUNDAY AND HOLIDAYS  ON CALL PAGER [phone]       4A 4C 4E 5A 5B  [phone]    6A 6B 6C 6D    [phone]    5C 7A 7B 7C 7D  [phone]    FOR HOURS 1600 - midnight PAGER [phone]            Social Work Services Progress Note    Hospital Day: 3  Date of Initial Social Work Evaluation:  Not completed  Consulted with:  Sky Austin ([phone] Ext [phone]) and attempted to reach patient by phone but he was off the unit per nursing - will try again later    Data:  Per charting patient is admitted with \"fever, elevated white cell count tachycardia and strep throat point towards sepsis from strep throat in immunocompromised patient.\"    Intervention:  Per charting patient is not discharging today - he was set to discharge to [facility] today by [employer]. Update Sky Alvarez (Geovanna) that discharge is being delayed - she was appreciative of the call.     Plan when he is medically ready for discharge remains the same - he will call for a [employer] ride. Please fax discharge orders to [phone].    Assessment:  Patient's discharge delayed    Plan:    Anticipated Disposition:  Sky Alvarez    Barriers to d/c plan:  Medical readiness    Follow Up:  Will plan for discharge tomorrow 9/27/20     Deidre GARCIA LICSW  9/26/2020    Searchable on [employer] - search SOCIAL WORK - for text paging options    SATURDAY AND SUNDAY AND HOLIDAYS  ON CALL PAGER [phone]       4A 4C 4E 5A 5B  [phone]    6A 6B 6C 6D    [phone]    5C 7A 7B 7C 7D  [phone]      FOR HOURS 1600 - midnight PAGER [phone]      "

## 2020-09-27 ENCOUNTER — HOSPITAL ENCOUNTER (EMERGENCY)
Facility: CLINIC | Age: 55
Discharge: HOME | End: 2020-09-27
Attending: EMERGENCY MEDICINE | Admitting: EMERGENCY MEDICINE
Payer: COMMERCIAL

## 2020-09-27 VITALS
HEART RATE: 98 BPM | OXYGEN SATURATION: 96 % | TEMPERATURE: 98.2 F | SYSTOLIC BLOOD PRESSURE: 131 MMHG | DIASTOLIC BLOOD PRESSURE: 92 MMHG | RESPIRATION RATE: 16 BRPM

## 2020-09-27 VITALS
TEMPERATURE: 97 F | HEIGHT: 64 IN | OXYGEN SATURATION: 97 % | RESPIRATION RATE: 18 BRPM | WEIGHT: 203.9 LBS | BODY MASS INDEX: 34.81 KG/M2 | SYSTOLIC BLOOD PRESSURE: 120 MMHG | DIASTOLIC BLOOD PRESSURE: 75 MMHG | HEART RATE: 89 BPM

## 2020-09-27 DIAGNOSIS — T78.40XA ALLERGIC REACTION, INITIAL ENCOUNTER: ICD-10-CM

## 2020-09-27 LAB
ANION GAP SERPL CALCULATED.3IONS-SCNC: 7 MMOL/L (ref 3–14)
BACTERIA SPEC CULT: NORMAL
BUN SERPL-MCNC: 10 MG/DL (ref 7–30)
CALCIUM SERPL-MCNC: 8.4 MG/DL (ref 8.5–10.1)
CHLORIDE SERPL-SCNC: 109 MMOL/L (ref 94–109)
CO2 SERPL-SCNC: 21 MMOL/L (ref 20–32)
CREAT SERPL-MCNC: 0.82 MG/DL (ref 0.66–1.25)
ERYTHROCYTE [DISTWIDTH] IN BLOOD BY AUTOMATED COUNT: 12.7 % (ref 10–15)
GFR SERPL CREATININE-BSD FRML MDRD: >90 ML/MIN/{1.73_M2}
GLUCOSE BLDC GLUCOMTR-MCNC: 125 MG/DL (ref 70–99)
GLUCOSE BLDC GLUCOMTR-MCNC: 177 MG/DL (ref 70–99)
GLUCOSE BLDC GLUCOMTR-MCNC: 91 MG/DL (ref 70–99)
GLUCOSE SERPL-MCNC: 184 MG/DL (ref 70–99)
HCT VFR BLD AUTO: 34.7 % (ref 40–53)
HGB BLD-MCNC: 11.7 G/DL (ref 13.3–17.7)
Lab: NORMAL
MCH RBC QN AUTO: 31.5 PG (ref 26.5–33)
MCHC RBC AUTO-ENTMCNC: 33.7 G/DL (ref 31.5–36.5)
MCV RBC AUTO: 93 FL (ref 78–100)
PLATELET # BLD AUTO: 276 10E9/L (ref 150–450)
POTASSIUM SERPL-SCNC: 3.5 MMOL/L (ref 3.4–5.3)
RBC # BLD AUTO: 3.72 10E12/L (ref 4.4–5.9)
SODIUM SERPL-SCNC: 138 MMOL/L (ref 133–144)
SPECIMEN SOURCE: NORMAL
WBC # BLD AUTO: 7 10E9/L (ref 4–11)

## 2020-09-27 PROCEDURE — 00000146 ZZHCL STATISTIC GLUCOSE BY METER IP

## 2020-09-27 PROCEDURE — 40000556 ZZH STATISTIC PERIPHERAL IV START W US GUIDANCE

## 2020-09-27 PROCEDURE — 99238 HOSP IP/OBS DSCHRG MGMT 30/<: CPT | Performed by: STUDENT IN AN ORGANIZED HEALTH CARE EDUCATION/TRAINING PROGRAM

## 2020-09-27 PROCEDURE — 80048 BASIC METABOLIC PNL TOTAL CA: CPT | Performed by: PHYSICIAN ASSISTANT

## 2020-09-27 PROCEDURE — 25000125 ZZHC RX 250: Performed by: HOSPITALIST

## 2020-09-27 PROCEDURE — 25000132 ZZH RX MED GY IP 250 OP 250 PS 637: Performed by: STUDENT IN AN ORGANIZED HEALTH CARE EDUCATION/TRAINING PROGRAM

## 2020-09-27 PROCEDURE — 25000132 ZZH RX MED GY IP 250 OP 250 PS 637: Performed by: HOSPITALIST

## 2020-09-27 PROCEDURE — 85027 COMPLETE CBC AUTOMATED: CPT | Performed by: PHYSICIAN ASSISTANT

## 2020-09-27 PROCEDURE — 25000128 H RX IP 250 OP 636: Performed by: STUDENT IN AN ORGANIZED HEALTH CARE EDUCATION/TRAINING PROGRAM

## 2020-09-27 PROCEDURE — 25000132 ZZH RX MED GY IP 250 OP 250 PS 637: Performed by: PHYSICIAN ASSISTANT

## 2020-09-27 PROCEDURE — 36415 COLL VENOUS BLD VENIPUNCTURE: CPT | Performed by: PHYSICIAN ASSISTANT

## 2020-09-27 PROCEDURE — 25000131 ZZH RX MED GY IP 250 OP 636 PS 637: Performed by: EMERGENCY MEDICINE

## 2020-09-27 PROCEDURE — 99283 EMERGENCY DEPT VISIT LOW MDM: CPT | Performed by: EMERGENCY MEDICINE

## 2020-09-27 PROCEDURE — 99283 EMERGENCY DEPT VISIT LOW MDM: CPT | Mod: Z6 | Performed by: EMERGENCY MEDICINE

## 2020-09-27 PROCEDURE — 99207 ZZC APP CREDIT; MD BILLING SHARED VISIT: CPT | Performed by: PHYSICIAN ASSISTANT

## 2020-09-27 PROCEDURE — 99285 EMERGENCY DEPT VISIT HI MDM: CPT | Mod: 25 | Performed by: EMERGENCY MEDICINE

## 2020-09-27 PROCEDURE — 25000132 ZZH RX MED GY IP 250 OP 250 PS 637: Performed by: EMERGENCY MEDICINE

## 2020-09-27 RX ORDER — DIPHENHYDRAMINE HCL 25 MG
25 CAPSULE ORAL EVERY 6 HOURS PRN
Status: DISCONTINUED | OUTPATIENT
Start: 2020-09-27 | End: 2020-09-27 | Stop reason: HOSPADM

## 2020-09-27 RX ORDER — METHYLPREDNISOLONE 4 MG
TABLET, DOSE PACK ORAL
Qty: 21 TABLET | Refills: 0 | Status: SHIPPED | OUTPATIENT
Start: 2020-09-27 | End: 2020-09-27

## 2020-09-27 RX ORDER — DIPHENHYDRAMINE HCL 25 MG
25 CAPSULE ORAL ONCE
Status: COMPLETED | OUTPATIENT
Start: 2020-09-27 | End: 2020-09-27

## 2020-09-27 RX ORDER — CLINDAMYCIN PHOSPHATE 600 MG/50ML
600 INJECTION, SOLUTION INTRAVENOUS EVERY 8 HOURS
Status: DISCONTINUED | OUTPATIENT
Start: 2020-09-27 | End: 2020-09-27 | Stop reason: HOSPADM

## 2020-09-27 RX ORDER — CLINDAMYCIN HCL 300 MG
600 CAPSULE ORAL 3 TIMES DAILY
Qty: 60 CAPSULE | Refills: 0 | Status: SHIPPED | OUTPATIENT
Start: 2020-09-27 | End: 2020-09-27 | Stop reason: SINTOL

## 2020-09-27 RX ORDER — DIPHENHYDRAMINE HCL 25 MG
25 TABLET ORAL EVERY 6 HOURS PRN
Qty: 28 TABLET | Refills: 0 | Status: SHIPPED | OUTPATIENT
Start: 2020-09-27 | End: 2020-09-27 | Stop reason: ALTCHOICE

## 2020-09-27 RX ORDER — HYDROXYZINE HYDROCHLORIDE 25 MG/1
25 TABLET, FILM COATED ORAL 3 TIMES DAILY PRN
Qty: 21 TABLET | Refills: 0 | Status: SHIPPED | OUTPATIENT
Start: 2020-09-27 | End: 2020-10-04

## 2020-09-27 RX ORDER — METHYLPREDNISOLONE 4 MG
TABLET, DOSE PACK ORAL
Qty: 21 TABLET | Refills: 0 | Status: ON HOLD | OUTPATIENT
Start: 2020-09-27 | End: 2020-11-11

## 2020-09-27 RX ORDER — CEFPODOXIME PROXETIL 100 MG/1
200 TABLET, FILM COATED ORAL 2 TIMES DAILY
Qty: 40 TABLET | Refills: 0 | Status: SHIPPED | OUTPATIENT
Start: 2020-09-27 | End: 2020-09-27

## 2020-09-27 RX ORDER — CEFPODOXIME PROXETIL 100 MG/1
200 TABLET, FILM COATED ORAL 2 TIMES DAILY
Qty: 40 TABLET | Refills: 0 | Status: ON HOLD | OUTPATIENT
Start: 2020-09-27 | End: 2020-11-11

## 2020-09-27 RX ORDER — PREDNISONE 20 MG/1
40 TABLET ORAL ONCE
Status: COMPLETED | OUTPATIENT
Start: 2020-09-27 | End: 2020-09-27

## 2020-09-27 RX ORDER — CLINDAMYCIN HCL 300 MG
300 CAPSULE ORAL 3 TIMES DAILY
Qty: 30 CAPSULE | Refills: 0 | Status: SHIPPED | OUTPATIENT
Start: 2020-09-27 | End: 2020-09-27

## 2020-09-27 RX ADMIN — PREDNISONE 40 MG: 20 TABLET ORAL at 21:04

## 2020-09-27 RX ADMIN — NAPROXEN 500 MG: 500 TABLET ORAL at 08:17

## 2020-09-27 RX ADMIN — CLINDAMYCIN IN 5 PERCENT DEXTROSE 600 MG: 12 INJECTION, SOLUTION INTRAVENOUS at 03:50

## 2020-09-27 RX ADMIN — DIPHENHYDRAMINE HYDROCHLORIDE 25 MG: 25 CAPSULE ORAL at 00:54

## 2020-09-27 RX ADMIN — DIPHENHYDRAMINE HYDROCHLORIDE 25 MG: 25 CAPSULE ORAL at 20:34

## 2020-09-27 RX ADMIN — FAMOTIDINE 20 MG: 20 TABLET ORAL at 08:17

## 2020-09-27 RX ADMIN — LIDOCAINE HYDROCHLORIDE 30 ML: 20 SOLUTION ORAL; TOPICAL at 04:12

## 2020-09-27 RX ADMIN — INSULIN GLARGINE 35 UNITS: 100 INJECTION, SOLUTION SUBCUTANEOUS at 08:17

## 2020-09-27 RX ADMIN — ASPIRIN 81 MG CHEWABLE TABLET 81 MG: 81 TABLET CHEWABLE at 08:17

## 2020-09-27 RX ADMIN — BICTEGRAVIR SODIUM, EMTRICITABINE, AND TENOFOVIR ALAFENAMIDE FUMARATE 1 TABLET: 50; 200; 25 TABLET ORAL at 08:17

## 2020-09-27 ASSESSMENT — ACTIVITIES OF DAILY LIVING (ADL)
ADLS_ACUITY_SCORE: 13

## 2020-09-27 ASSESSMENT — ENCOUNTER SYMPTOMS
SHORTNESS OF BREATH: 0
FEVER: 0
ABDOMINAL PAIN: 0
TROUBLE SWALLOWING: 0
SORE THROAT: 0
COUGH: 0
JOINT SWELLING: 0
NAUSEA: 0
VOMITING: 0
FACIAL SWELLING: 0
ARTHRALGIAS: 0
HEADACHES: 0
DYSURIA: 0
VOICE CHANGE: 0

## 2020-09-27 NOTE — ED PROVIDER NOTES
ED Provider Note  [facility]      History     Chief Complaint   Patient presents with     Allergic Reaction     HPI  Andrew Lockwood is a 52 year old male with a past medical history significant for HIV (last cd4 362 on 8/6/20) on HAART therapy, DM2, and penicillin allergy who presents here to the Emergency Department due to an allergic reaction.     Patient was admitted here at [facility] (9/24-9/27) for group A strep throat c/b sepsis.  Was initially treated with IV Unasyn and vancomycin.  After infectious disease was consulted he was transitioned to IV clindamycin for a positive strep throat swab.  Blood cultures were negative.  He was further de-escalated to oral amoxicillin but developed new fevers on oral antibiotics.  He later developed an allergic reaction with diffuse pruritic urticarial rash.  He was taken off of amoxicillin and transitioned back to IV clindamycin.  Upon discharge patient was prescribed oral clindamycin.  Patient states that his initial rash from the amoxicillin had resolved.  He took his first dose of oral clindamycin today around noon and developed a different rash today around 230.  This rash was located more on his back than the initial rash.  The rash also extends across his chest and abdomen as well as both arms.  It is extremely itchy.  Patient denies sore throat, difficulty breathing, fever, joint pain or swelling, nausea or vomiting.      Past Medical History  Past Medical History:   Diagnosis Date     AIDS (H)      Allergic rhinitis due to other allergen     DNS     Anal dysplasia      Chronic abdominal pain      CNS toxoplasmosis (H)      Diabetes type 2, controlled (H)      GERD (gastroesophageal reflux disease)      History of seizure      History of substance abuse (H)      HIV (human immunodeficiency virus infection) (H)      HLD (hyperlipidemia)      Lung nodules      Periungual wart      PTSD (post-traumatic stress disorder)      Sleep apnea      doesn't use CPAP     Past Surgical History:   Procedure Laterality Date     ANOSCOPY N/A 9/9/2020    Procedure: Exam Under Anesthesia, ANOSCOPY, fulgeration of rectal fissures with Rectal Biopsies;  Surgeon: Thanh Lundberg MD;  Location: [facility] OR     C NONSPECIFIC PROCEDURE      right forearm fracture     COLONOSCOPY Left 1/22/2016    Procedure: COMBINED COLONOSCOPY, SINGLE OR MULTIPLE BIOPSY/POLYPECTOMY BY BIOPSY;  Surgeon: Clark Saini MD;  Location: [facility] GI     HC EXPLORE UNDESC TESTIS,INGUIN/SCROTAL       LAPAROSCOPIC APPENDECTOMY N/A 1/31/2018    Procedure: LAPAROSCOPIC APPENDECTOMY;  LAPAROSCOPIC APPENDECTOMY;  Surgeon: Dawn Holt MD;  Location: [facility] OR     LAPAROSCOPY DIAGNOSTIC (GENERAL) N/A 7/26/2016    Procedure: LAPAROSCOPY DIAGNOSTIC (GENERAL);  Surgeon: Susannah Arriaga MD;  Location: [facility] OR     LAPAROSCOPY DIAGNOSTIC (GENERAL) N/A 4/16/2018    Procedure: LAPAROSCOPY DIAGNOSTIC (GENERAL);  Diagnostic laparoscopy and lysis of adhesions;  Surgeon: Prince Dowling MD;  Location: [facility] OR     OPTICAL TRACKING SYSTEM CRANIOTOMY, EXCISE TUMOR, COMBINED Left 4/10/2015    Procedure: COMBINED OPTICAL TRACKING SYSTEM CRANIOTOMY, EXCISE TUMOR;  Surgeon: Mirlande Colmenares MD;  Location: [facility] OR     REPAIR GAMEKEEPER'S THUMB Right 12/2/2016    Procedure: REPAIR LIGAMENT ULNAR COLLATERAL THUMB (GAMEKEEPER'S);  Surgeon: Evin Zamorano MD;  Location: [facility] OR     cefpodoxime (VANTIN) 100 MG tablet  hydrOXYzine (ATARAX) 25 MG tablet  methylPREDNISolone (MEDROL DOSEPAK) 4 MG tablet therapy pack  acetaminophen (TYLENOL) 500 MG tablet  benzocaine-menthol (CEPACOL) 15-3.6 MG lozenge  bictegravir-emtricitabine-tenofovir (BIKTARVY) -25 MG per tablet  blood glucose (NO BRAND SPECIFIED) lancets standard  blood glucose (NO BRAND SPECIFIED) test strip  blood glucose monitoring (NO BRAND SPECIFIED) meter device kit  docusate sodium (COLACE) 50 MG capsule  famotidine (PEPCID) 20 MG  "tablet  insulin glargine (BASAGLAR KWIKPEN) 100 UNIT/ML pen  insulin pen needle (B-D U/F) 31G X 8 MM miscellaneous  insulin pen needle (BD SCOTT U/F) 32G X 4 MM miscellaneous  naproxen (NAPROSYN) 500 MG tablet  naproxen (NAPROSYN) 500 MG tablet  NOVOLOG FLEXPEN 100 UNIT/ML soln  Nutritional Supplements (GLUCOSE MANAGEMENT) TABS  polyethylene glycol (MIRALAX) powder  psyllium (METAMUCIL SMOOTH TEXTURE) 28 % packet  SENNA-docusate sodium (SENNA S) 8.6-50 MG tablet  sennosides (SENOKOT) 8.6 MG tablet  STATIN NOT PRESCRIBED (INTENTIONAL)      Allergies   Allergen Reactions     Metformin      Abdominal pain     Milk [Lac Bovis] Hives     Tylenol [Acetaminophen] Itching     Dulaglutide Rash     Penicillin V Other (See Comments) and Rash     Diffuse maculopapular rash + feels \"high\", per pt.      Family History  Family History   Problem Relation Age of Onset     Diabetes Brother      Diabetes Father      Alzheimer Disease Father      Unknown/Adopted Mother      Diabetes Paternal Grandfather      Cancer No family hx of         no skin cancer     Skin Cancer No family hx of         no famiy hx of skin cancer     Glaucoma No family hx of      Macular Degeneration No family hx of      Social History   Social History     Tobacco Use     Smoking status: Current Some Day Smoker     Packs/day: 0.50     Years: 40.00     Pack years: 20.00     Types: Cigarettes     Smokeless tobacco: Former User   Substance Use Topics     Alcohol use: No     Alcohol/week: 0.0 standard drinks     Drinks per session: Patient refused     Comment: Last etoh in 2007     Drug use: Not Currently     Types: Marijuana, Methamphetamines     Comment: Sober 9 months       Past medical history, past surgical history, medications, allergies, family history, and social history were reviewed with the patient. No additional pertinent items.       Review of Systems   Constitutional: Negative for fever.   HENT: Negative for facial swelling, sore throat, trouble " swallowing and voice change.    Respiratory: Negative for cough and shortness of breath.    Cardiovascular: Negative for chest pain.   Gastrointestinal: Negative for abdominal pain, nausea and vomiting.   Genitourinary: Negative for dysuria.   Musculoskeletal: Negative for arthralgias and joint swelling.   Skin: Positive for rash.   Allergic/Immunologic: Positive for immunocompromised state.   Neurological: Negative for headaches.   All other systems reviewed and are negative.    A complete review of systems was performed with pertinent positives and negatives noted in the HPI, and all other systems negative.    Physical Exam   BP: 117/71  Pulse: 98  Temp: 98.2  F (36.8  C)  Resp: 16  SpO2: 99 %  Physical Exam  Vitals signs and nursing note reviewed.   Constitutional:       General: He is not in acute distress.     Appearance: Normal appearance. He is well-developed. He is not ill-appearing or diaphoretic.   HENT:      Head: Normocephalic and atraumatic.      Jaw: No trismus.      Nose: Nose normal.      Mouth/Throat:      Mouth: Mucous membranes are moist.      Tongue: No lesions.      Pharynx: Oropharynx is clear. No pharyngeal swelling.   Eyes:      General: No scleral icterus.     Conjunctiva/sclera: Conjunctivae normal.   Neck:      Musculoskeletal: Normal range of motion and neck supple. No neck rigidity.   Cardiovascular:      Rate and Rhythm: Normal rate.   Pulmonary:      Effort: Pulmonary effort is normal. No respiratory distress.      Breath sounds: No stridor.   Abdominal:      General: There is no distension.   Musculoskeletal: Normal range of motion.         General: No deformity or signs of injury.   Skin:     General: Skin is warm and dry.      Coloration: Skin is not jaundiced or pale.      Findings: Erythema and rash present. No petechiae or wound. Rash is papular. Rash is not urticarial or vesicular.             Comments: Micropapular (sandpaper-like) red pruritic rash over back, neck     Neurological:      General: No focal deficit present.      Mental Status: He is alert and oriented to person, place, and time.   Psychiatric:         Mood and Affect: Mood normal.         Behavior: Behavior normal.         Thought Content: Thought content normal.         ED Course      Procedures                         Results for orders placed or performed during the hospital encounter of 09/27/20   Glucose by meter     Status: None   Result Value Ref Range    Glucose 91 70 - 99 mg/dL   Results for orders placed or performed during the hospital encounter of 09/24/20   Soft tissue neck CT w contrast     Status: None    Narrative    CT SOFT TISSUE NECK W CONTRAST 9/24/2020 9:02 PM    History:  fever and facial swelling      Comparison:  Cervical spine CT 12/29/2019. Head and neck MRI  4/19/2015.     Technique: Following intravenous administration of nonionic iodinated  contrast medium, thin section helical CT images were obtained from the  skull base down to the level of the aortic arch.  Axial, coronal and  sagittal reformations were performed with 2-3 mm slice thickness  reconstruction. Images were reviewed in soft tissue, lung and bone  windows.    Contrast: iopamidol (ISOVUE-370) solution 100 mL    Findings:   Evaluation of the mucosal space demonstrates no evident abnormality in  the nasopharynx, oropharynx, hypopharynx or the glottis. The tongue  base appears normal. The major salivary glands appear unremarkable.  The thyroid gland appears normal.    Multiple prominent cervical lymph nodes largest being a left  jugulodigastric node measuring up to 2 cm. The remaining lymph nodes  are not enlarged by size criteria for example left level III 7 mm  lymph node (series 2, image 46). The fascial spaces in the neck are  intact bilaterally. The major vascular structures in the neck appear  unremarkable. Mild enlargement of the adenoid tonsils.    Evaluation of the osseous structures demonstrate no worrisome lytic  or  sclerotic lesion. No overt spinal canal or neuroforaminal stenosis.  The visualized paranasal sinuses are clear. Opacification of the  dependent portions of the left mastoid air cells. The right mastoid  air cells are clear.    The visualized lung apices are clear.      Impression    Impression:  Multiple prominent left-sided cervical lymph including a left-sided  jugulodigastric lymph node which measures up to 2 cm. The remaining  cervical lymph nodes are not enlarged by size criteria. Given the  history of HIV and sore throat, these findings may represent a  reactive process. Short interval follow-up is recommended to assess  for resolution.    I have personally reviewed the examination and initial interpretation  and I agree with the findings.    CHARLENE SALAS MD   XR Chest Port 1 View     Status: None    Narrative    XR CHEST PORT 1 VW  9/24/2020 10:24 PM      HISTORY: fever    COMPARISON: Chest x-ray 12/27/2019, chest CT 7/8/2020    TECHNIQUE: Upright frontal view of the chest    FINDINGS: Low lung volumes. No focal consolidation, pneumothorax, or  pleural effusion. Cardiac mediastinal silhouette is within normal  limits. Spine. The upper abdomen is unremarkable. No suspicious  osseous lesion.      Impression    IMPRESSION: Low lung volumes without focal consolidation.    I have personally reviewed the examination and initial interpretation  and I agree with the findings.    DRE HIGGINS MD   Comprehensive metabolic panel     Status: Abnormal   Result Value Ref Range    Sodium 133 133 - 144 mmol/L    Potassium 3.5 3.4 - 5.3 mmol/L    Chloride 103 94 - 109 mmol/L    Carbon Dioxide 23 20 - 32 mmol/L    Anion Gap 7 3 - 14 mmol/L    Glucose 202 (H) 70 - 99 mg/dL    Urea Nitrogen 15 7 - 30 mg/dL    Creatinine 0.91 0.66 - 1.25 mg/dL    GFR Estimate >90 >60 mL/min/[1.73_m2]    GFR Estimate If Black >90 >60 mL/min/[1.73_m2]    Calcium 9.0 8.5 - 10.1 mg/dL    Bilirubin Total 1.0 0.2 - 1.3 mg/dL     Albumin 3.6 3.4 - 5.0 g/dL    Protein Total 7.5 6.8 - 8.8 g/dL    Alkaline Phosphatase 106 40 - 150 U/L    ALT 29 0 - 70 U/L    AST 22 0 - 45 U/L   CBC with platelets differential     Status: Abnormal   Result Value Ref Range    WBC 12.7 (H) 4.0 - 11.0 10e9/L    RBC Count 4.25 (L) 4.4 - 5.9 10e12/L    Hemoglobin 13.5 13.3 - 17.7 g/dL    Hematocrit 39.0 (L) 40.0 - 53.0 %    MCV 92 78 - 100 fl    MCH 31.8 26.5 - 33.0 pg    MCHC 34.6 31.5 - 36.5 g/dL    RDW 12.9 10.0 - 15.0 %    Platelet Count 310 150 - 450 10e9/L    Diff Method Automated Method     % Neutrophils 75.3 %    % Lymphocytes 14.4 %    % Monocytes 7.2 %    % Eosinophils 2.0 %    % Basophils 0.4 %    % Immature Granulocytes 0.7 %    Nucleated RBCs 0 0 /100    Absolute Neutrophil 9.6 (H) 1.6 - 8.3 10e9/L    Absolute Lymphocytes 1.8 0.8 - 5.3 10e9/L    Absolute Monocytes 0.9 0.0 - 1.3 10e9/L    Absolute Eosinophils 0.3 0.0 - 0.7 10e9/L    Absolute Basophils 0.1 0.0 - 0.2 10e9/L    Abs Immature Granulocytes 0.1 0 - 0.4 10e9/L    Absolute Nucleated RBC 0.0    Lactate for Sepsis Protocol     Status: None   Result Value Ref Range    Lactate for Sepsis Protocol 1.8 0.7 - 2.0 mmol/L   Symptomatic COVID-19 Virus (Coronavirus) by PCR     Status: None    Specimen: Nasopharyngeal   Result Value Ref Range    COVID-19 Virus PCR to [facility] - Source Nasopharyngeal     COVID-19 Virus PCR to [facility] - Result       Test received-See reflex to IDDL test SARS CoV2 (COVID-19) Virus RT-PCR   UA with Microscopic     Status: Abnormal   Result Value Ref Range    Color Urine Yellow     Appearance Urine Clear     Glucose Urine 300 (A) NEG^Negative mg/dL    Bilirubin Urine Negative NEG^Negative    Ketones Urine Negative NEG^Negative mg/dL    Specific Gravity Urine 1.018 1.003 - 1.035    Blood Urine Small (A) NEG^Negative    pH Urine 6.5 5.0 - 7.0 pH    Protein Albumin Urine Negative NEG^Negative mg/dL    Urobilinogen mg/dL Normal 0.0 - 2.0 mg/dL    Nitrite Urine Negative NEG^Negative     Leukocyte Esterase Urine Negative NEG^Negative    Source Midstream Urine     WBC Urine 0 0 - 5 /HPF    RBC Urine 9 (H) 0 - 2 /HPF   Drug abuse screen 6 urine (chem dep)     Status: None   Result Value Ref Range    Amphetamine Qual Urine Negative NEG^Negative    Barbiturates Qual Urine Negative NEG^Negative    Benzodiazepine Qual Urine Negative NEG^Negative    Cannabinoids Qual Urine Negative NEG^Negative    Cocaine Qual Urine Negative NEG^Negative    Ethanol Qual Urine Negative NEG^Negative    Opiates Qualitative Urine Negative NEG^Negative   SARS-CoV-2 COVID-19 Virus (Coronavirus) RT-PCR Nasopharyngeal     Status: None    Specimen: Nasopharyngeal   Result Value Ref Range    SARS-CoV-2 Virus Specimen Source Nasopharyngeal     SARS-CoV-2 PCR Result NEGATIVE     SARS-CoV-2 PCR Comment       Testing was performed using the [employer] SARS-CoV-2 Assay on the [employer] Instrument System.   Additional information about this Emergency Use Authorization (EUA) assay can be found via   the Lab Guide.     Procalcitonin     Status: None   Result Value Ref Range    Procalcitonin <0.05 ng/ml   Glucose by meter     Status: None   Result Value Ref Range    Glucose 88 70 - 99 mg/dL   Glucose by meter     Status: None   Result Value Ref Range    Glucose 98 70 - 99 mg/dL   Lactic acid level STAT     Status: None   Result Value Ref Range    Lactate for Sepsis Protocol 1.7 0.7 - 2.0 mmol/L   Hepatits C antibody     Status: None   Result Value Ref Range    Hepatitis C Antibody Nonreactive NR^Nonreactive   Hepatitis B surface antigen     Status: None   Result Value Ref Range    Hep B Surface Agn Nonreactive NR^Nonreactive   Treponema Abs w Reflex to RPR and Titer     Status: None   Result Value Ref Range    Treponema Antibodies Nonreactive NR^Nonreactive   CBC with platelets differential     Status: Abnormal   Result Value Ref Range    WBC 10.6 4.0 - 11.0 10e9/L    RBC Count 3.78 (L) 4.4 - 5.9 10e12/L    Hemoglobin 11.9 (L) 13.3 - 17.7 g/dL     Hematocrit 35.4 (L) 40.0 - 53.0 %    MCV 94 78 - 100 fl    MCH 31.5 26.5 - 33.0 pg    MCHC 33.6 31.5 - 36.5 g/dL    RDW 13.1 10.0 - 15.0 %    Platelet Count 255 150 - 450 10e9/L    Diff Method Automated Method     % Neutrophils 76.6 %    % Lymphocytes 13.1 %    % Monocytes 8.1 %    % Eosinophils 1.1 %    % Basophils 0.3 %    % Immature Granulocytes 0.8 %    Nucleated RBCs 0 0 /100    Absolute Neutrophil 8.2 1.6 - 8.3 10e9/L    Absolute Lymphocytes 1.4 0.8 - 5.3 10e9/L    Absolute Monocytes 0.9 0.0 - 1.3 10e9/L    Absolute Eosinophils 0.1 0.0 - 0.7 10e9/L    Absolute Basophils 0.0 0.0 - 0.2 10e9/L    Abs Immature Granulocytes 0.1 0 - 0.4 10e9/L    Absolute Nucleated RBC 0.0    Comprehensive metabolic panel     Status: Abnormal   Result Value Ref Range    Sodium 139 133 - 144 mmol/L    Potassium 3.4 3.4 - 5.3 mmol/L    Chloride 110 (H) 94 - 109 mmol/L    Carbon Dioxide 22 20 - 32 mmol/L    Anion Gap 7 3 - 14 mmol/L    Glucose 102 (H) 70 - 99 mg/dL    Urea Nitrogen 9 7 - 30 mg/dL    Creatinine 0.89 0.66 - 1.25 mg/dL    GFR Estimate >90 >60 mL/min/[1.73_m2]    GFR Estimate If Black >90 >60 mL/min/[1.73_m2]    Calcium 7.8 (L) 8.5 - 10.1 mg/dL    Bilirubin Total 1.1 0.2 - 1.3 mg/dL    Albumin 2.9 (L) 3.4 - 5.0 g/dL    Protein Total 6.1 (L) 6.8 - 8.8 g/dL    Alkaline Phosphatase 88 40 - 150 U/L    ALT 26 0 - 70 U/L    AST 28 0 - 45 U/L   TSH with free T4 reflex     Status: None   Result Value Ref Range    TSH 0.87 0.40 - 4.00 mU/L   Lipid panel reflex to direct LDL     Status: Abnormal   Result Value Ref Range    Cholesterol 134 <200 mg/dL    Triglycerides 136 <150 mg/dL    HDL Cholesterol 31 (L) >39 mg/dL    LDL Cholesterol Calculated 75 <100 mg/dL    Non HDL Cholesterol 103 <130 mg/dL   Hemoglobin A1c     Status: Abnormal   Result Value Ref Range    Hemoglobin A1C 8.2 (H) 0 - 5.6 %   Glucose by meter     Status: Abnormal   Result Value Ref Range    Glucose 129 (H) 70 - 99 mg/dL   Glucose by meter     Status: Abnormal    Result Value Ref Range    Glucose 103 (H) 70 - 99 mg/dL   Glucose by meter     Status: Abnormal   Result Value Ref Range    Glucose 171 (H) 70 - 99 mg/dL   Comprehensive metabolic panel     Status: Abnormal   Result Value Ref Range    Sodium 138 133 - 144 mmol/L    Potassium 3.4 3.4 - 5.3 mmol/L    Chloride 109 94 - 109 mmol/L    Carbon Dioxide 25 20 - 32 mmol/L    Anion Gap 4 3 - 14 mmol/L    Glucose 157 (H) 70 - 99 mg/dL    Urea Nitrogen 10 7 - 30 mg/dL    Creatinine 0.82 0.66 - 1.25 mg/dL    GFR Estimate >90 >60 mL/min/[1.73_m2]    GFR Estimate If Black >90 >60 mL/min/[1.73_m2]    Calcium 8.6 8.5 - 10.1 mg/dL    Bilirubin Total 1.2 0.2 - 1.3 mg/dL    Albumin 2.8 (L) 3.4 - 5.0 g/dL    Protein Total 6.5 (L) 6.8 - 8.8 g/dL    Alkaline Phosphatase 109 40 - 150 U/L    ALT 39 0 - 70 U/L    AST 39 0 - 45 U/L   CBC with platelets     Status: Abnormal   Result Value Ref Range    WBC 9.9 4.0 - 11.0 10e9/L    RBC Count 3.84 (L) 4.4 - 5.9 10e12/L    Hemoglobin 12.0 (L) 13.3 - 17.7 g/dL    Hematocrit 35.8 (L) 40.0 - 53.0 %    MCV 93 78 - 100 fl    MCH 31.3 26.5 - 33.0 pg    MCHC 33.5 31.5 - 36.5 g/dL    RDW 13.1 10.0 - 15.0 %    Platelet Count 270 150 - 450 10e9/L   Glucose by meter     Status: Abnormal   Result Value Ref Range    Glucose 143 (H) 70 - 99 mg/dL   Iron and iron binding capacity     Status: Abnormal   Result Value Ref Range    Iron 26 (L) 35 - 180 ug/dL    Iron Binding Cap 324 240 - 430 ug/dL    Iron Saturation Index 8 (L) 15 - 46 %   Ferritin     Status: None   Result Value Ref Range    Ferritin 186 26 - 388 ng/mL   Folate     Status: None   Result Value Ref Range    Folate 24.0 >5.4 ng/mL   Vitamin B12     Status: None   Result Value Ref Range    Vitamin B12 320 193 - 986 pg/mL   Glucose by meter     Status: Abnormal   Result Value Ref Range    Glucose 193 (H) 70 - 99 mg/dL   Glucose by meter     Status: Abnormal   Result Value Ref Range    Glucose 226 (H) 70 - 99 mg/dL   Glucose by meter     Status:  Abnormal   Result Value Ref Range    Glucose 164 (H) 70 - 99 mg/dL   CBC with platelets     Status: Abnormal   Result Value Ref Range    WBC 7.0 4.0 - 11.0 10e9/L    RBC Count 3.72 (L) 4.4 - 5.9 10e12/L    Hemoglobin 11.7 (L) 13.3 - 17.7 g/dL    Hematocrit 34.7 (L) 40.0 - 53.0 %    MCV 93 78 - 100 fl    MCH 31.5 26.5 - 33.0 pg    MCHC 33.7 31.5 - 36.5 g/dL    RDW 12.7 10.0 - 15.0 %    Platelet Count 276 150 - 450 10e9/L   Basic metabolic panel     Status: Abnormal   Result Value Ref Range    Sodium 138 133 - 144 mmol/L    Potassium 3.5 3.4 - 5.3 mmol/L    Chloride 109 94 - 109 mmol/L    Carbon Dioxide 21 20 - 32 mmol/L    Anion Gap 7 3 - 14 mmol/L    Glucose 184 (H) 70 - 99 mg/dL    Urea Nitrogen 10 7 - 30 mg/dL    Creatinine 0.82 0.66 - 1.25 mg/dL    GFR Estimate >90 >60 mL/min/[1.73_m2]    GFR Estimate If Black >90 >60 mL/min/[1.73_m2]    Calcium 8.4 (L) 8.5 - 10.1 mg/dL   Glucose by meter     Status: Abnormal   Result Value Ref Range    Glucose 177 (H) 70 - 99 mg/dL   Glucose by meter     Status: Abnormal   Result Value Ref Range    Glucose 125 (H) 70 - 99 mg/dL   EKG 12-lead, complete     Status: None   Result Value Ref Range    Interpretation ECG Click View Image link to view waveform and result    Infectious Disease General Adult IP Consult: Patient to be seen: Routine within 24 hrs; Call back #: [email or web address]; HIV patient with fever and sore throat, failed amoxacillin outpatient.; Consultant may enter orders: Yes; Requesting provider? Attending ...     Status: None ()    Mao Arredondo DDS     9/25/2020 12:33 PM    GENERAL ID SERVICE CONSULTATION     Patient:  Andrew Lockwood   Date of birth 11/27/1967, Medical record number [id number]  Date of Visit:  09/25/2020  Date of Admission: 9/24/2020  Consult Requester:Santana Hansen*          Assessment and Recommendations:   ASSESSMENT:  1. Streptococcal pharyngitis  2. Fever  3. Leukocytosis    DISCUSSION:   Andrew Lockwood is a 52-year-old male with  PMH of HIV on HAART, DM,   hx of MRSA, anxiety who presented to the emergency department on   9/24 with tooth pain.  Was discharged on amoxicillin and return   to the emergency department later that night with sore throat. He   reports he took one dose of amoxicillin.  He triggered SIRS alert   with T-max of 103.1, WBC 12.7, and . Lactate was w/in   normal range. Pt was given IVF and abx changed to IV clindamycin.   Rapid strep A test positive. CT neck showing enlarged adenoid   tonsils and prominent cervical lymph nodes, otherwise normal and   negative for dental abscess.    RECOMMENDATION:  1. Given that pt only received one dose of amoxicillin before   returning to ED, would recommend continuing treatment with   penicillin V 500mg TID for 10 days  2. Discontinue clindamycin  3. Follow blood and urine cultures    Thank you for this consult. ID will continue to follow.     Patient was discussed with Dr. Cordoba.     Mao Hayes, MEIS  Infectious Diseases, PGY-1    ________________________________________________________________    Consult Question:.  Admission Diagnosis: Streptococcal pharyngitis [J02.0]  Human immunodeficiency virus I infection (H) [B20]  Sialadenitis [K11.20]  High fever [R50.9]  Sepsis without acute organ dysfunction, due to unspecified   organism (H) [A41.9]         History of Present Illness:   Andrew Lockwood is a 52-year-old male with PMH of HIV on HAART, DM,   hx of MRSA, anxiety who presented to the emergency department on   9/24 with tooth pain.  Was discharged on amoxicillin and return   to the emergency department later that night with sore throat. He   reports he took one dose of amoxicillin.  He triggered SIRS alert   with T-max of 103.1, WBC 12.7, and . Lactate was w/in   normal range. Pt was given IVF and abx changed to IV clindamycin.   Rapid strep A test positive. CT neck showing enlarged adenoid   tonsils and prominent cervical lymph nodes, otherwise normal and    negative for dental abscess.               Review of Systems:   Per chart review:   CONSTITUTIONAL:  Positive for fever  EYES: negative for icterus  ENT:  Endorsing sore throat that waxes and wanes. Endorses lower   incisor tooth pain.  RESPIRATORY:  negative for cough with sputum and dyspnea  CARDIOVASCULAR:  negative for chest pain, dyspnea  GASTROINTESTINAL:  negative for nausea, vomiting, diarrhea and   constipation  GENITOURINARY:  negative for dysuria  HEME:  No easy bruising  INTEGUMENT:  negative for rash and pruritus  NEURO:  Negative for headache         Past Medical History:     Past Medical History:   Diagnosis Date     AIDS (H)      Allergic rhinitis due to other allergen     DNS     Anal dysplasia      Chronic abdominal pain      CNS toxoplasmosis (H)      Diabetes type 2, controlled (H)      GERD (gastroesophageal reflux disease)      History of seizure      History of substance abuse (H)      HIV (human immunodeficiency virus infection) (H)      HLD (hyperlipidemia)      Lung nodules      Periungual wart      PTSD (post-traumatic stress disorder)      Sleep apnea     doesn't use CPAP            Past Surgical History:     Past Surgical History:   Procedure Laterality Date     ANOSCOPY N/A 9/9/2020    Procedure: Exam Under Anesthesia, ANOSCOPY, fulgeration of   rectal fissures with Rectal Biopsies;  Surgeon: Thanh Lundberg MD;  Location: [facility]     C NONSPECIFIC PROCEDURE      right forearm fracture     COLONOSCOPY Left 1/22/2016    Procedure: COMBINED COLONOSCOPY, SINGLE OR MULTIPLE   BIOPSY/POLYPECTOMY BY BIOPSY;  Surgeon: Clark Saini MD;    Location: [facility]     HC EXPLORE UNDESC TESTIS,INGUIN/SCROTAL       LAPAROSCOPIC APPENDECTOMY N/A 1/31/2018    Procedure: LAPAROSCOPIC APPENDECTOMY;  LAPAROSCOPIC   APPENDECTOMY;  Surgeon: Dawn Holt MD;    Location: [facility]     LAPAROSCOPY DIAGNOSTIC (GENERAL) N/A 7/26/2016    Procedure: LAPAROSCOPY DIAGNOSTIC (GENERAL);  Surgeon:  Susannah Arriaga MD;  Location: [facility]     LAPAROSCOPY DIAGNOSTIC (GENERAL) N/A 4/16/2018    Procedure: LAPAROSCOPY DIAGNOSTIC (GENERAL);  Diagnostic   laparoscopy and lysis of adhesions;  Surgeon: Prince Dowling MD;  Location: [facility]     OPTICAL TRACKING SYSTEM CRANIOTOMY, EXCISE TUMOR, COMBINED Left   4/10/2015    Procedure: COMBINED OPTICAL TRACKING SYSTEM CRANIOTOMY, EXCISE   TUMOR;  Surgeon: Mirlande Colmenares MD;  Location: [facility]     REPAIR GAMEKEEPER'S THUMB Right 12/2/2016    Procedure: REPAIR LIGAMENT ULNAR COLLATERAL THUMB   (GAMEKEEPER'S);  Surgeon: Evin Zamorano MD;  Location:   [facility]            Family History:   Reviewed and non-contributory.   Family History   Problem Relation Age of Onset     Diabetes Brother      Diabetes Father      Alzheimer Disease Father      Unknown/Adopted Mother      Diabetes Paternal Grandfather      Cancer No family hx of         no skin cancer     Skin Cancer No family hx of         no famiy hx of skin cancer     Glaucoma No family hx of      Macular Degeneration No family hx of             Social History:     Social History     Tobacco Use     Smoking status: Current Some Day Smoker     Packs/day: 0.50     Years: 40.00     Pack years: 20.00     Types: Cigarettes     Smokeless tobacco: Former User   Substance Use Topics     Alcohol use: No     Alcohol/week: 0.0 standard drinks     Drinks per session: Patient refused     Comment: Last etoh in 2007     History   Sexual Activity     Sexual activity: Not Currently     Partners: Female, Male     Comment: Last sexual activity 2008            Current Medications:       aspirin  81 mg Oral Daily     atorvastatin  80 mg Oral QPM     bictegravir-emtricitabine-tenofovir  1 tablet Oral Daily     clindamycin  900 mg Intravenous Q8H     famotidine  20 mg Oral BID     heparin ANTICOAGULANT  5,000 Units Subcutaneous Q12H     insulin aspart  20 Units Subcutaneous TID w/meals     insulin aspart  1-7 Units Subcutaneous  "TID AC     insulin aspart  1-5 Units Subcutaneous At Bedtime     insulin glargine  35 Units Subcutaneous BID     nicotine  1 patch Transdermal Daily     nicotine   Transdermal Q8H     vancomycin (VANCOCIN) IV  1,500 mg Intravenous Q12H            Allergies:     Allergies   Allergen Reactions     Metformin      Abdominal pain     Milk [Lac Bovis] Hives     Dulaglutide Rash            Physical Exam:   Vitals were reviewed  Patient Vitals for the past 24 hrs:   BP Temp Temp src Pulse Resp SpO2 Height Weight   09/25/20 0551 114/64 99.3  F (37.4  C) Oral 100 20 99 % -- --   09/25/20 0350 114/60 100.9  F (38.3  C) Oral 118 20 98 % -- --   09/25/20 0315 122/64 102  F (38.9  C) Oral 117 20 97 % -- --   09/25/20 0141 (!) 146/86 101.6  F (38.7  C) Oral 116 20 97 % --   92.5 kg (203 lb 14.4 oz)   09/25/20 0115 116/62 -- -- 102 -- 97 % -- --   09/25/20 0110 -- -- -- 109 -- 97 % -- --   09/24/20 2330 -- 100.2  F (37.9  C) Oral 100 26 97 % -- --   09/24/20 2325 109/71 -- -- -- -- -- -- --   09/24/20 2200 124/79 -- -- 112 -- 95 % -- --   09/24/20 2155 126/81 -- -- 112 -- -- -- --   09/24/20 2110 116/69 -- -- 117 -- 97 % -- --   09/24/20 2108 122/66 -- -- 114 -- 98 % -- --   09/24/20 1859 -- 103.1  F (39.5  C) Oral 131 -- 95 % -- --   09/24/20 1842 (!) 140/73 -- -- -- -- -- 1.626 m (5' 4\") 92 kg (202 lb   13.2 oz)       Physical Examination per primary team:   GENERAL: Alert and oriented x 3. NAD. Ambulatory. Cooperative.   HEENT: Anicteric sclera. Mucous membranes moist. NC. AT. EOMI.   PERRLA  CV: RRR. S1, S2. No murmurs appreciated.   RESPIRATORY: Effort normal. Lungs CTAB with no wheezing, rales,   rhonchi.   GI: Abdomen soft and non distended with normoactive bowel sounds   present in all quadrants. No tenderness, rebound, guarding. No   lesions.   NEUROLOGICAL: No focal deficits. Moves all extremities.  CN 2-12   grossly intact.  EXTREMITIES: No peripheral edema. Intact bilateral pedal pulses.   SKIN: No jaundice. No " rashes.  BACK: no CVA tenderness, no spinous tenderness            Laboratory Data:     Inflammatory Markers    Recent Labs   Lab Test 02/26/20  0552 02/25/20  0637 02/14/20  1032 10/08/18  0720 10/06/18  0658 10/05/18  0818 03/06/18  2105 12/28/16  1540  07/07/15  2205 05/25/15  2217   SED  --  21* 18  --   --  23*  --   --   --  25* 37*   CRP 7.9 17.0* 5.0 <2.9 15.0* 14.0* 53.0* <2.9   < > 7.8 5.0    < > = values in this interval not displayed.       Hematology Studies    Recent Labs   Lab Test 09/25/20 0716 09/24/20 1857 08/14/20 1232 07/30/20  1228 07/22/20  0335 06/25/20  0255   WBC 10.6 12.7* 10.3 7.1 7.2 7.4   ANEU 8.2 9.6* 6.3 4.0 4.1 4.2   AEOS 0.1 0.3 0.2 0.2 0.3 0.2   HGB 11.9* 13.5 16.0 14.3 14.1 13.6   MCV 94 92 93 92 92 94    310 370 296 323 334       Metabolic Studies     Recent Labs   Lab Test 09/25/20 0716 09/24/20 1857 09/09/20  0744 08/27/20  1451 08/14/20  1232 07/30/20  1228 07/22/20  0335    133  --   --  137 139 134   POTASSIUM 3.4 3.5 3.7 3.5 3.3* 3.4 3.4   CHLORIDE 110* 103  --   --  104 108 104   CO2 22 23  --   --  27 27 25   BUN 9 15  --   --  12 15 21   CR 0.89 0.91 0.87  --  0.96 0.80 0.74   GFRESTIMATED >90 >90 >90  --  89 >90 >90       Hepatic Studies    Recent Labs   Lab Test 09/25/20  0716 09/24/20 1857 08/14/20  1232 07/30/20  1228 07/22/20  0335 06/25/20  0255   BILITOTAL 1.1 1.0 1.4* 0.7 0.5 0.9   ALKPHOS 88 106 102 95 124 92   ALBUMIN 2.9* 3.6 4.0 3.5 3.3* 3.6   AST 28 22 31 17 26 30   ALT 26 29 27 24 29 27       Microbiology:  Culture Micro   Date Value Ref Range Status   09/24/2020 Culture negative monitoring continues  Preliminary   09/24/2020 No growth after 10 hours  Preliminary   09/24/2020 No growth after 10 hours  Preliminary   02/25/2020 (A)  Final    Moderate growth  Methicillin resistant Staphylococcus aureus (MRSA)     10/26/2019 No growth  Final   10/26/2019 No growth  Final   10/04/2018 (A)  Final    Moderate growth  Staphylococcus aureus  This  isolate DOES NOT demonstrate inducible clindamycin   resistance in vitro. Clindamycin   is susceptible and could be used when indicated, however,   erythromycin is resistant and   should not be used.     02/05/2018 Moderate growth  Escherichia coli   (A)  Final   02/05/2018 Heavy growth  Streptococcus anginosus   (A)  Final   02/05/2018 Heavy growth  Mixed gram positive sushma    Final   02/05/2018 (A)  Final    Heavy growth  Bacteroides fragilis group  Susceptibility testing not routinely done     02/05/2018 (A)  Final    Heavy growth  Bacteroides fragilis  Susceptibility testing not routinely done     02/05/2018 (A)  Final    Plus  Heavy growth  Mixed aerobic and anaerobic sushma     02/05/2018 Canceled, Test credited  Final   02/05/2018 Incorrectly ordered by PCU/Clinic  Final   02/05/2018 Test reordered as correct code  Final   02/05/2018 Culture negative after 4 weeks  Final   02/04/2018 No growth  Final   11/07/2017 No growth  Final   11/07/2017 (A)  Final    Cultured on the 2nd day of incubation:  Staphylococcus capitis  This isolate DOES NOT demonstrate inducible clindamycin   resistance in vitro. Clindamycin   is susceptible and could be used when indicated, however,   erythromycin is resistant and   should not be used.     11/07/2017   Final    Critical Value/Significant Value, preliminary result only,   called to and read back by  Katalina BUI at 1555 on 11/9/17 by MIGUEL.     11/07/2017   Final    (Note)  POSITIVE for Staphylococci other than S.aureus, S.epidermidis and  S.lugdunensis, by Verigene multiplex nucleic acid test.  Coagulase-negative staphylococci are the most common venipuncture   or  collection associated skin CONTAMINANTS grown in blood cultures.  Final identification and antimicrobial susceptibility testing   will be  verified by standard methods.     Specimen tested with Verigene multiplex, gram-positive blood   culture  nucleic acid test for the following targets: Staph aureus,  Staph  epidermidis, Staph lugdunensis, other Staph species, Enterococcus  faecalis, Enterococcus faecium, Streptococcus species, S.   agalactiae,  S. anginosus grp., S. pneumoniae, S. pyogenes, Listeria sp., mecA  (methicillin resistance) and Ramon/B (vancomycin resistance).    Critical Value/Significant Value called to and read back by Analy Doe RN, @1810 11/9/17.DH.     05/17/2016 No growth  Final   05/10/2016 No growth  Final   05/10/2016 No growth  Final   05/10/2016 No growth  Final   05/10/2016 (A)  Final    On day 2, isolated in broth only: Staphylococcus epidermidis  Critical Value/Significant Value, preliminary result only, called   to and read   back by Dr. Marcus Silva on 5/12/16 14:40 CWi  No anaerobes isolated     05/10/2016 Culture negative after 4 weeks  Final   05/10/2016 No Beta Streptococcus isolated  Final   05/09/2016 No growth  Final   05/09/2016 No growth  Final   05/09/2016 No growth  Final   01/26/2016   Final    No Salmonella, Shigella, Campylobacter, E. coli O157, Aeromonas,   or Plesiomonas   isolated.     01/18/2016   Final    Culture negative for acid fast bacilli  Assayed at [facility] [address]     01/18/2016   Final    Culture negative for acid fast bacilli  Assayed at [facility] [address]     01/15/2016   Final    The Microbiology Lab does not accept stool specimens for routine   culture if the   patient has been hospitalized for 3 days or longer and does not   have a   diagnosis of gastroenteritis.     01/13/2016 Test reordered as miscellaneous test on 1/13/16.    Final   01/11/2016   Final    Culture negative for acid fast bacilli  Assayed at [facility] [address]     01/10/2016 No growth after 14 days  Final   01/10/2016 No growth  Final   01/10/2016 No growth  Final   01/09/2016 No growth  Final   01/09/2016 No growth  Final   01/09/2016 No growth  Final   12/25/2015 No growth  Final   12/25/2015  No growth  Final   07/07/2015 No growth  Final   07/07/2015 No growth  Final   07/07/2015 No growth  Final   04/27/2015 No growth  Final   04/26/2015 No growth  Final   04/26/2015 No growth  Final   04/25/2015 No growth  Final   04/25/2015 No growth  Final   04/18/2015 No growth  Final   04/10/2015 (A)  Final    On day 2, isolated in broth only: Coagulase negative   Staphylococcus not isolated   or reported on routine culture  Critical Value/Significant Value, preliminary result only, called   to and read   back by Maranda Maldonado RN (4/12/15 @ 1327) JR  On day 14, isolated in broth only: Propionibacterium acnes   Susceptibilities done   on aerobic culture.     04/10/2015 Culture negative after 4 weeks  Final   04/10/2015 (A)  Final    On day 4, isolated in broth only: Propionibacterium acnes  Critical Value/Significant Value called to and read back by   Nena Rainey Rn   at 1414 on 04.15.15 by   Cefotaxime Testing unavailable due to 's backorder.     12/15/2011 No Beta Streptococcus isolated  Final   01/18/2006 No growth  Final       Urine Studies    Recent Labs   Lab Test 09/24/20  2005 06/25/20  0400 05/23/20  2028 04/27/20  1747 02/25/20  1023  02/03/20  1603   LEUKEST Negative Negative Negative Negative Negative   < >   Negative   WBCU 0  --  0 1 <1  --  1    < > = values in this interval not displayed.       Vancomycin Levels    Recent Labs   Lab Test 04/14/15  0711   VANCOMYCIN 15.4       Hepatitis B Testing   Recent Labs   Lab Test 04/13/15  0816   HBCAB Nonreactive   HEPBANG Nonreactive   HBCM Nonreactive   A nonreactive result suggests lack of recent exposure to the   virus in the   preceding 6 months.     HBEABY Negative  Reference range: Negative  (Note)  Performed by [facility],  [address] [place] [address] [phone]  [email or web address], Nayan Corley MD, Lab. Director     HBEAGN Negative  Reference range: Negative  (Note)  Performed by [facility],  [place] [address] [phone]  [email or web address], Nayan Corley MD, Lab. Director       Hepatitis C Testing     Hepatitis C Antibody   Date Value Ref Range Status   02/11/2020 Nonreactive NR^Nonreactive Final     Comment:     Assay performance characteristics have not been established for   newborns,   infants, and children     10/31/2019 Nonreactive NR^Nonreactive Final     Comment:     Assay performance characteristics have not been established for   newborns,   infants, and children       Respiratory Virus Testing    RSV Rapid Antigen Result   Date Value Ref Range Status   01/31/2018 Positive (A) NEG^Negative Final     Comment:     Test results must be correlated with clinical data. If   necessary, results   should be confirmed by a molecular assay or viral culture.            Social Work IP Consult     Status: None ()    Shanell Sims MSW     9/25/2020  2:57 PM  Social Work Services Progress Note    Hospital Day: 2  Collaborated with:  Patient, [facility] (ph [phone], fax   [phone])    Data:  Pt is a 52-year-old male admitted to [facility] 9/25/20 for   COVID-19 r/o and infectious work up.     Intervention:  SW consult received for discharge planning. Per   chart review pt lives at [facility] sober housing. Spoke with pt   via phone. Pt requests SW call [facility] and update them that he   is in the hospital and discharging tomorrow. Pt states he will   take a Lyft for transportation.     Spoke with Geovanna at [facility] (ph [phone]). Geovanna states pt   can return tomorrow. She requests discharge orders be faxed to   [phone]. Updated pt, pt agreeable with plan.     Assessment:  Pt discharging tomorrow back to [facility]    Plan:    Anticipated Disposition:  Sober housing    Barriers to d/c plan:  Medical stability    Follow Up:  SW to follow for discharge planning    RADHA Reeves, [facility]    [facility]  Pager  [phone]  Phone [phone]         Blood culture     Status: None (Preliminary result)    Specimen: Hand, Right; Blood    Right Hand   Result Value Ref Range    Specimen Description Blood Right Hand     Culture Micro No growth after 3 days    Blood culture     Status: None (Preliminary result)    Specimen: Hand, Left; Blood    Left Hand   Result Value Ref Range    Specimen Description Blood Left Hand     Culture Micro No growth after 3 days    Urine Culture Aerobic Bacterial     Status: None    Specimen: Urine Midstream; Midstream Urine   Result Value Ref Range    Specimen Description Midstream Urine     Special Requests Specimen received in preservative     Culture Micro No growth    Streptococcus A Rapid Scr w Reflx to PCR     Status: Abnormal    Specimen: Throat   Result Value Ref Range    Strep Specimen Description Throat     Streptococcus Group A Rapid Screen Positive (A) NEG^Negative   Methicillin Resistant Staph Aureus PCR     Status: None    Specimen: Nares   Result Value Ref Range    Specimen Description Nares     Methicillin Resist/Sens S. aureus PCR Negative NEG^Negative   Throat Culture Aerobic Bacterial     Status: None    Specimen: Throat   Result Value Ref Range    Specimen Description Throat     Special Requests Specimen collected in eSwab transport (white cap)     Culture Micro Moderate growth  Normal sushma      Blood culture     Status: None (Preliminary result)    Specimen: Blood    Right Arm   Result Value Ref Range    Specimen Description Blood Right Arm     Culture Micro No growth after 1 day    Blood culture     Status: None (Preliminary result)    Specimen: Blood    Left Hand   Result Value Ref Range    Specimen Description Blood Left Hand     Culture Micro No growth after 1 day      Medications   diphenhydrAMINE (BENADRYL) capsule 25 mg (25 mg Oral Given 9/27/20 2034)   predniSONE (DELTASONE) tablet 40 mg (40 mg Oral Given 9/27/20 2104)        Assessments & Plan (with Medical Decision  Making)   Andrew Lockwood is a 52 year old male with a past medical history significant for HIV (last cd4 362 on 8/6/20) on HAART therapy, DM2, and penicillin allergy who presents here to the Emergency Department due to an allergic reaction.     Ddx: Drug reaction, scarlet fever, delayed reaction to amoxicillin, allergy to clindamycin    Patient's rash does not appear urticarial but it is very pruritic.  I discussed with infectious disease who said it would be fine to treat patient with steroids and Benadryl for management of pruritus.  She had a low suspicion that patient's rash was secondary to oral clindamycin but recommended switching therapy to Cefpodoxime for the next 10 days.  I prescribed patient hydroxyzine and a Medrol Dosepak for his rash.  I gave him a dose of prednisone in the emergency department to help manage his symptoms until he can fill his outpatient prescription.  Patient is aware that he should discontinue clindamycin and instead take Cefpodoxime.  He was advised to follow-up with his primary providers, as scheduled.  Return precautions provided including swelling in his throat, difficulty breathing, fainting, fever, painful rash, skin sloughing.    I have reviewed the nursing notes. I have reviewed the findings, diagnosis, plan and need for follow up with the patient.    New Prescriptions    CEFPODOXIME (VANTIN) 100 MG TABLET    Take 2 tablets (200 mg) by mouth 2 times daily    HYDROXYZINE (ATARAX) 25 MG TABLET    Take 1 tablet (25 mg) by mouth 3 times daily as needed for itching    METHYLPREDNISOLONE (MEDROL DOSEPAK) 4 MG TABLET THERAPY PACK    Follow Package Directions       Final diagnoses:   Allergic reaction, initial encounter       --    [facility], EMERGENCY DEPARTMENT  9/27/2020     Jeannine Moses MD  09/27/20 0482

## 2020-09-27 NOTE — PLAN OF CARE
Time  4967-3343    Pt alert and oriented x4, VSS on RA. Afebrile throughout the day. Pt reports feeling much better overall. Throat pain has subsided. Pt denies other pain and nausea. Blood cultures were drawn this morning but remain NGTD. Pt continues on oral penicllin for strep infection. Pt is up independently in room, voiding WNL. Last BM yesterday. Appetite is much improved. Scheduled pradnial insulin held for breakfast/lunch as BGs were 143, 193. This evening BG was 226. MD instructed RN to give scheduled 20 unit(s) of insulin and to hold sliding scale. Plan for probable discharge home tomorrow to [facility] if pt is afebrile and stable overnight.

## 2020-09-27 NOTE — DISCHARGE SUMMARY
"[facility], [place]  Hospitalist Discharge Summary      Date of Admission:  9/24/2020  Date of Discharge:  9/27/2020 10:00 AM  Discharging Provider: Arabella Jovel PA-C  Discharge Team: Hospitalist Service, Gold 6    Discharge Diagnoses     Group A Streptococcal Pharyngitis   HIV  DM2  Normocytic Anemia  Hx of Polysubstance Abuse  Hx of Anal Dysplasia       Follow-ups Needed After Discharge    Follow up with your primary care provider in one week for your post hospitalization visit  Follow up with your Infectious Disease provider in one month for continued monitoring of your HIV    Appointments on [place] and/or [facility] (with [facility] or [facility]   provider or service). Call [phone] if you haven't heard regarding   these appointments within 7 days of discharge.             Unresulted Labs Ordered in the Past 30 Days of this Admission     Date and Time Order Name Status Description    9/26/2020 0803 Blood culture Preliminary     9/26/2020 0803 Blood culture Preliminary     9/24/2020 1902 Blood culture Preliminary     9/24/2020 1902 Blood culture Preliminary       These results will be followed up by PCP    Discharge Disposition   Discharged to home  Condition at discharge: Stable      Hospital Course   Andrew Lockwood is a 51 yo with a PMH of HIV/AIDS on HAART therapy, DM2, CNS toxoplasmosis, seizures, and HLD who presented to the ED on 9/24 with fevers, sore throat, myalgias, and dental pain. He had previously been seen in the ED with complaints of dental pain and was started on amoxicillin by their providers.  He was only able to take one dose of his course before developing worsening symptoms, prompting him to return for further evaluation. CT Soft Tissue neck on 9/24 was significant for \"multiple prominent left-sided cervical lymph including a left-sided jugulodigastric lymph node which measures up to 2 cm. The remaining cervical lymph nodes are not enlarged by size criteria\" Due " to immunocompromised status, patient was initially started on IV Unasyn and Vancomycin for broad spectrum coverage and Infectious Disease was consulted.  His throat swab returned positive for strep A. IV antibiotics were de-escalated to IV clindamycin. Blood cultures from 9/24 were negative. Per ID recommendations, antibiotics were further de-escalated to oral amoxicillin. Patient developed new fevers overnight 9/25 on oral antibiotics. It was decided to observe the patient for another 24 hours while on amoxicillin, but patient developed an allergic reaction with a diffuse, pruritic urticarial rash. He was transitioned back to IV clindamycin.  Prior to discharge, patient was tolerating oral clindamycin with improvement in his sore throat and myalgias. The follow medical conditions were addressed during his hospital stay:     Sepsis 2/2 Streptococcus Group A Pharyngitis - Patient's clinical course as described above. Improvement in symptoms and afebrile by discharge  On Discharge:   -Continue clindamycin 600mg TID for 10 days  -sore throat lozenges PRN     HIV - Follows with Dr. Prieto of Infectious Disease. CD4 count on 8/6 was 362.   -Continue Biktarvy daily     DM2 - Follows with Endocrinology as an outpatient. Hgb A1C is 8.2  -Continue PTA Lantus 35 units BID, Insulin aspart 20units TID with meals, and sliding scale  -Carb controlled diet.      Normocytic Anemia - Hgb 11.7 on day of discharge, previously 13.5. Suspect dilutional in origin as he received 4Ls NS in ED. No signs/symptoms of active bleeding  -Follow up with PCP      Hx of Polysubstance abuse - Currently lives at [facility], which is a sober house for the past two years.  History of methamphetamine use.      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  Per chart review, patient has deferred treatment   -Follow up with Infectious Disease and PCP on discharge.         Consultations This Hospital Stay    PHARMACY TO DOSE VANCO  INFECTIOUS DISEASE GENERAL ADULT IP CONSULT  INFECTIOUS DISEASE GENERAL ADULT IP CONSULT  VASCULAR ACCESS CARE ADULT IP CONSULT  SOCIAL WORK IP CONSULT  VASCULAR ACCESS CARE ADULT IP CONSULT  VASCULAR ACCESS CARE ADULT IP CONSULT  VASCULAR ACCESS CARE ADULT IP CONSULT  VASCULAR ACCESS CARE ADULT IP CONSULT    Code Status   Prior    Time Spent on this Encounter   Arabella KELLY PA-C, personally saw the patient today and spent less than or equal to 30 minutes discharging this patient.       Arabella Jovel PA-C  [facility] [place]  ______________________________________________________________________    Physical Exam   Vital Signs: Temp: 97  F (36.1  C) Temp src: Oral BP: 120/75 Pulse: 89   Resp: 18 SpO2: 97 % O2 Device: None (Room air)    Weight: 203 lbs 14.4 oz    GENERAL: Alert and oriented x 3. NAD. Ambulatory. Cooperative.   HEENT: Anicteric sclera. Mucous membranes moist. NC. AT. EOMI. PERRLA.    CV: RRR. S1, S2. No murmurs appreciated.   RESPIRATORY: Effort normal on RA. Lungs CTAB with no wheezing, rales, rhonchi.   GI: Abdomen soft and non distended with normoactive bowel sounds present in all quadrants. No tenderness, rebound, guarding. No lesions.   NEUROLOGICAL: No focal deficits. Moves all extremities.  CN 2-12 grossly intact.  EXTREMITIES: No peripheral edema. Intact bilateral pedal pulses.   SKIN: No jaundice. No rashes.  BACK: no CVA tenderness, no spinous tenderness       Primary Care Physician   Caitie Lamb    Discharge Orders      Lipid Profile     Reason for your hospital stay    You were admitted for sore throat and fevers.  There was initial concern for a COVID 19 infection, but this was negative. You were started on IV antibiotics and IV fluids. Your throat swab returned positive for Group A strep.  You were transitioned to oral amoxicillin but you developed an allergy.  You were discharged home in stable condition on a new  oral antibiotic.     Adult [facility] Follow-up and recommended labs and tests    Follow up with your primary care provider in one week for your post hospitalization visit  Follow up with your Infectious Disease provider in one month for continued monitoring of your HIV    Appointments on [place] and/or [facility] (with [facility] or [facility] provider or service). Call [phone] if you haven't heard regarding these appointments within 7 days of discharge.     Activity    Your activity upon discharge: activity as tolerated     When to contact your care team    Call your primary doctor if you have any of the following: temperature >100.4F, chest pain, palpitations, shortness of breath, abdominal pain, blood in your stools, difficulty swallowing.     Diet    Follow this diet upon discharge: Consistent Carbohydrate Diet 6136-2020 Calories: Moderate Consistent CHO (4-6 CHO units/meal)       Significant Results and Procedures   Results for BREE ROSAS (MRN [id number]) as of 9/27/2020 14:45   Ref. Range 9/24/2020 21:06   COVID-19 Virus PCR to [facility] - Source Unknown Nasopharyngeal   COVID-19 Virus PCR to [facility] - Result Unknown Test received-See reflex to IDDL test SARS CoV2 (COVID-19) Virus RT-PCR   SARS-CoV-2 Virus Specimen Source Unknown Nasopharyngeal   SARS-CoV-2 PCR Result Unknown NEGATIVE       Discharge Medications   Discharge Medication List as of 9/27/2020  9:50 AM      START taking these medications    Details   benzocaine-menthol (CEPACOL) 15-3.6 MG lozenge Place 1 lozenge inside cheek every hour as needed for sore throat, Disp-15 lozenge,R-0, E-Prescribe         CONTINUE these medications which have CHANGED    Details   clindamycin (CLEOCIN) 300 MG capsule Take 2 capsules (600 mg) by mouth 3 times daily for 10 days, Disp-60 capsule,R-0, E-Prescribe         CONTINUE these medications which have NOT CHANGED    Details   acetaminophen (TYLENOL) 500 MG tablet Take 1 tablet (500 mg) by mouth every 6 hours as  needed for mild pain, Disp-120 tablet,R-1, E-Prescribe      bictegravir-emtricitabine-tenofovir (BIKTARVY) -25 MG per tablet Take 1 tablet by mouth daily, Disp-30 tablet,R-11, E-Prescribe      blood glucose (NO BRAND SPECIFIED) lancets standard Use to test blood sugar four times daily or as directed.Disp-100 each,[id number]      blood glucose (NO BRAND SPECIFIED) test strip 1 strip by In Vitro route 4 times daily (before meals and nightly) Use to test blood sugars 4 times daily or as directed, Disp-100 strip,R-11, E-Prescribe      blood glucose monitoring (NO BRAND SPECIFIED) meter device kit Use to test blood sugar 4 times daily or as directed.Disp-1 kit,[id number] provide meter that is covered by insurance.      docusate sodium (COLACE) 50 MG capsule Take 2 capsules (100 mg) by mouth 2 times daily as needed for constipation, Disp-60 capsule,R-5, E-Prescribe      famotidine (PEPCID) 20 MG tablet Take 1 tablet (20 mg) by mouth 2 times daily, Disp-30 tablet,R-11, Local Print      insulin glargine (BASAGLAR KWIKPEN) 100 UNIT/ML pen Inject 35 Units Subcutaneous 2 times daily, Disp-15 mL,R-15, E-PrescribeIf Basaglar is not covered by insurance, may substitute Lantus at same dose and frequency.        !! insulin pen needle (B-D U/F) 31G X 8 MM miscellaneous Use 1 pen needles daily or as directed.Disp-100 each,[id number]      !! insulin pen needle (BD SCOTT U/F) 32G X 4 MM miscellaneous USE 3 TO 4 NEEDLES DAILYDisp-400 each,[id number]      !! naproxen (NAPROSYN) 500 MG tablet Take 1 tablet (500 mg) by mouth 2 times daily (with meals) for 15 days, Disp-30 tablet,R-0, E-Prescribe      !! naproxen (NAPROSYN) 500 MG tablet Take 1 tablet (500 mg) by mouth 2 times daily (with meals), Disp-60 tablet,R-1, E-Prescribe      NOVOLOG FLEXPEN 100 UNIT/ML soln Inject 20 units with each meal, along with sliding scale 1 unit per 25mg/dL over 140 before meals and over 200 at bedtime. Max daily dose 90 units,  "Disp-75 mL,R-3,OLESYA, E-Prescribe      Nutritional Supplements (GLUCOSE MANAGEMENT) TABS Use 1-2 tabs for hypoglycemia., Disp-30 tablet,R-3, E-Prescribe      polyethylene glycol (MIRALAX) powder Take 17 g (1 capful) by mouth daily as needed for constipation, Disp-289 g,R-0, E-Prescribe      psyllium (METAMUCIL SMOOTH TEXTURE) 28 % packet Use one packet in 8oz water daily., Disp-30 each,R-0, E-Prescribe      SENNA-docusate sodium (SENNA S) 8.6-50 MG tablet Take 1 tablet by mouth 2 times daily Hold for loose stools., Disp-60 tablet,R-5, E-Prescribe      sennosides (SENOKOT) 8.6 MG tablet Take 1 tablet by mouth 2 times daily as needed for constipation, Disp-60 tablet,R-5, E-Prescribe      STATIN NOT PRESCRIBED (INTENTIONAL) Reason Statin was Not Prescribed: OTHER - Enter reason is comments section (This option does NOTexclude patient from measure) / noted in chart that is recommended multiple times       !! - Potential duplicate medications found. Please discuss with provider.      STOP taking these medications       amoxicillin (AMOXIL) 500 MG capsule Comments:   Reason for Stopping:             Allergies   Allergies   Allergen Reactions     Metformin      Abdominal pain     Milk [Lac Bovis] Hives     Tylenol [Acetaminophen] Itching     Dulaglutide Rash     Penicillin V Other (See Comments) and Rash     Diffuse maculopapular rash + feels \"high\", per pt.      "

## 2020-09-27 NOTE — PLAN OF CARE
"Time: 1900-0730    Reason for admission/Dx:   Streptococcal pharyngitis [J02.0]  Human immunodeficiency virus I infection (H) [B20]  Sialadenitis [K11.20]  High fever [R50.9]  Sepsis without acute organ dysfunction, due to unspecified organism (H) [A41.9]     /81 (BP Location: Left arm)   Pulse 88   Temp 97.8  F (36.6  C) (Oral)   Resp 20   Ht 1.626 m (5' 4\")   Wt 92.5 kg (203 lb 14.4 oz)   SpO2 97%   BMI 35.00 kg/m      Shift changes: VSS, on RA. Afebrile. Sore throat and swelling improved, per pt report; however discovered allergy to Penicillin this night.  Interim, bridged back to IV Clinda. GI cocktail given, per pt req for GERD/heart burn, with temporary relief. Pt requests snacks/meals throughout night; BGLs are mildly hyperglycemic during NOC; Lantus regimen maintained. Voiding WDL.  MIVF NS @ 125 continued.    Plan:  Establish new PO abx regimen, if needed. Potential discharge to [facility].     "

## 2020-09-27 NOTE — ED AVS SNAPSHOT
[facility], [place], Emergency Department  [address] [place] [id number]  Phone:  [phone]                                    Andrew Lockwood   MRN: [id number]    Department:  [facility], Emergency Department   Date of Visit:  9/27/2020           After Visit Summary Signature Page    I have received my discharge instructions, and my questions have been answered. I have discussed any challenges I see with this plan with the nurse or doctor.    ..........................................................................................................................................  Patient/Patient Representative Signature      ..........................................................................................................................................  Patient Representative Print Name and Relationship to Patient    ..................................................               ................................................  Date                                   Time    ..........................................................................................................................................  Reviewed by Signature/Title    ...................................................              ..............................................  Date                                               Time          22EPIC Rev 08/18

## 2020-09-27 NOTE — ED TRIAGE NOTES
Pt c/o allergic reaction/trunk rash.    Discharged from hospital today w/ prescription for clindamycin for group A strep. Had allergy to amoxicillin while inpatient.    Airway intact -  denies throat swelling/tongue swelling or SOB

## 2020-09-27 NOTE — PLAN OF CARE
Pt alert and oriented x4 today, VSS on RA. Afebrile.  Generalized rash present but improved from overnight. Abx switched to oral clindamycin. Pt denies pain and nausea except mild throat discomfort with swallowing food. MD placed discharge order. PIV removed. AVS printed and reviewed with pt. All questions answered. All belongings gathered and sent with pt.  Pt to  meds from [facility] discharge pharmacy. Pt planning to use Lyft for ride back to [employer]. Adequate for discharge.

## 2020-09-27 NOTE — PROVIDER NOTIFICATION
"Pt up to take shower and notified writer of diffuse urticarial rash to both chest, back and upper extremities, approximately 1.5 hours post-PO Penicillin. Pt VSS on RA. Denies any airway involvement. MD notified and 25mg PO Benadryl given.     Of note, pt also reports at this time that he has felt \"high and itchy\" about 1-2 hours after previous Penicillin dosages.  Penicillin V added to pt allergy list. Will continue to monitor.   "

## 2020-09-28 NOTE — DISCHARGE INSTRUCTIONS
Please make an appointment to follow up with Your Primary Care Provider in 3-5 days.    Stop taking clindamycin and take cefpodoxime instead.     Take the Medrol Dosepak and Benadryl as prescribed to help with rash and itching.      Return to the emergency department for worsening rash, fever, skin sloughing, swelling in your throat, difficulty breathing, fainting, or painful rash involving your mucous membranes (mouth, eyes, or genital area).

## 2020-09-29 ENCOUNTER — TELEPHONE (OUTPATIENT)
Dept: ENDOCRINOLOGY | Facility: CLINIC | Age: 55
End: 2020-09-29

## 2020-09-29 NOTE — TELEPHONE ENCOUNTER
[facility] Call Center    Phone Message    May a detailed message be left on voicemail: yes     Reason for Call: Other: Dawn states the Pt's blood sugar was at 474 and was told to call if it is over 350.  Please follow up at [phone]     Action Taken: Message routed to:  [facility] ([facility] ENDO    Travel Screening: Not Applicable

## 2020-09-29 NOTE — TELEPHONE ENCOUNTER
I called the care center back and his blood sugar  of  474  This morning after coffee and cream . Last check was 133 at dinner  Often times he eats before the check so it's elevated . I think it's the creamer . Aleah Hampton RN on 9/29/2020 at 6:08 PM

## 2020-09-30 LAB
BACTERIA SPEC CULT: NO GROWTH
BACTERIA SPEC CULT: NO GROWTH
SPECIMEN SOURCE: NORMAL
SPECIMEN SOURCE: NORMAL

## 2020-10-01 NOTE — TELEPHONE ENCOUNTER
I agree- I would not want to make changes to insulin for postprandial BG checks. Need to be checked before any PO intake   Haley Hampton RN on 10/1/2020 at 1:10 PM

## 2020-11-11 ENCOUNTER — HOSPITAL ENCOUNTER (INPATIENT)
Facility: CLINIC | Age: 55
LOS: 2 days | Discharge: NURSING, REHAB OR LONG-TERM CARE | End: 2020-11-13
Attending: EMERGENCY MEDICINE | Admitting: INTERNAL MEDICINE
Payer: COMMERCIAL

## 2020-11-11 ENCOUNTER — TELEPHONE (OUTPATIENT)
Dept: ENDOCRINOLOGY | Facility: CLINIC | Age: 55
End: 2020-11-11

## 2020-11-11 ENCOUNTER — APPOINTMENT (OUTPATIENT)
Dept: CT IMAGING | Facility: CLINIC | Age: 55
End: 2020-11-11
Attending: EMERGENCY MEDICINE
Payer: COMMERCIAL

## 2020-11-11 DIAGNOSIS — F41.1 GENERALIZED ANXIETY DISORDER: ICD-10-CM

## 2020-11-11 DIAGNOSIS — B07.8 PERIUNGUAL WART: ICD-10-CM

## 2020-11-11 DIAGNOSIS — Z20.828 EXPOSURE TO SARS-ASSOCIATED CORONAVIRUS: ICD-10-CM

## 2020-11-11 DIAGNOSIS — J02.0 STREP SORE THROAT: ICD-10-CM

## 2020-11-11 DIAGNOSIS — R91.8 PULMONARY NODULES: ICD-10-CM

## 2020-11-11 DIAGNOSIS — L03.90 CELLULITIS: ICD-10-CM

## 2020-11-11 DIAGNOSIS — L73.9 FOLLICULITIS: ICD-10-CM

## 2020-11-11 DIAGNOSIS — G93.9 BRAIN LESION: Primary | ICD-10-CM

## 2020-11-11 DIAGNOSIS — B58.2 TOXOPLASMA ENCEPHALITIS (H): ICD-10-CM

## 2020-11-11 DIAGNOSIS — J18.9 PNEUMONIA OF LEFT UPPER LOBE DUE TO INFECTIOUS ORGANISM: ICD-10-CM

## 2020-11-11 DIAGNOSIS — B02.9 HERPES ZOSTER: ICD-10-CM

## 2020-11-11 DIAGNOSIS — R10.84 GENERALIZED ABDOMINAL PAIN: ICD-10-CM

## 2020-11-11 DIAGNOSIS — Z90.49 S/P APPENDECTOMY: ICD-10-CM

## 2020-11-11 DIAGNOSIS — Z21 HUMAN IMMUNODEFICIENCY VIRUS I INFECTION (H): ICD-10-CM

## 2020-11-11 DIAGNOSIS — K56.609 SMALL BOWEL OBSTRUCTION (H): ICD-10-CM

## 2020-11-11 DIAGNOSIS — J02.0 STREPTOCOCCAL PHARYNGITIS: ICD-10-CM

## 2020-11-11 DIAGNOSIS — K59.01 SLOW TRANSIT CONSTIPATION: ICD-10-CM

## 2020-11-11 DIAGNOSIS — R51.9 HEADACHE: ICD-10-CM

## 2020-11-11 DIAGNOSIS — H65.07 RECURRENT ACUTE SEROUS OTITIS MEDIA, UNSPECIFIED LATERALITY: ICD-10-CM

## 2020-11-11 DIAGNOSIS — N52.9 ERECTILE DYSFUNCTION, UNSPECIFIED ERECTILE DYSFUNCTION TYPE: ICD-10-CM

## 2020-11-11 DIAGNOSIS — L72.0 EPIDERMAL CYST: ICD-10-CM

## 2020-11-11 DIAGNOSIS — B37.0 THRUSH: ICD-10-CM

## 2020-11-11 DIAGNOSIS — K12.0 APHTHOUS ULCER: ICD-10-CM

## 2020-11-11 DIAGNOSIS — Z47.89 AFTERCARE FOLLOWING SURGERY OF THE MUSCULOSKELETAL SYSTEM: ICD-10-CM

## 2020-11-11 DIAGNOSIS — K56.609 INTESTINAL OBSTRUCTION, UNSPECIFIED CAUSE, UNSPECIFIED WHETHER PARTIAL OR COMPLETE (H): ICD-10-CM

## 2020-11-11 DIAGNOSIS — R73.9 HYPERGLYCEMIA: ICD-10-CM

## 2020-11-11 DIAGNOSIS — B58.2 CNS TOXOPLASMOSIS (H): ICD-10-CM

## 2020-11-11 DIAGNOSIS — M25.519: ICD-10-CM

## 2020-11-11 DIAGNOSIS — F41.0 PANIC DISORDER: ICD-10-CM

## 2020-11-11 DIAGNOSIS — K56.0 ADYNAMIC ILEUS (H): ICD-10-CM

## 2020-11-11 DIAGNOSIS — R11.2 NON-INTRACTABLE VOMITING WITH NAUSEA: ICD-10-CM

## 2020-11-11 DIAGNOSIS — K35.30 ACUTE APPENDICITIS WITH LOCALIZED PERITONITIS: ICD-10-CM

## 2020-11-11 DIAGNOSIS — E11.69 TYPE 2 DIABETES MELLITUS WITH OTHER SPECIFIED COMPLICATION, WITH LONG-TERM CURRENT USE OF INSULIN (H): ICD-10-CM

## 2020-11-11 DIAGNOSIS — B58.9 TOXOPLASMOSIS: ICD-10-CM

## 2020-11-11 DIAGNOSIS — K56.609 SBO (SMALL BOWEL OBSTRUCTION) (H): ICD-10-CM

## 2020-11-11 DIAGNOSIS — K36 OTHER APPENDICITIS: ICD-10-CM

## 2020-11-11 DIAGNOSIS — M79.644 PAIN OF RIGHT THUMB: ICD-10-CM

## 2020-11-11 DIAGNOSIS — K62.82 ANAL DYSPLASIA: ICD-10-CM

## 2020-11-11 DIAGNOSIS — G47.00 INSOMNIA, UNSPECIFIED TYPE: ICD-10-CM

## 2020-11-11 DIAGNOSIS — G47.00 INSOMNIA: ICD-10-CM

## 2020-11-11 DIAGNOSIS — K59.00 CONSTIPATION, UNSPECIFIED CONSTIPATION TYPE: ICD-10-CM

## 2020-11-11 DIAGNOSIS — Z00.00 PREVENTATIVE HEALTH CARE: ICD-10-CM

## 2020-11-11 DIAGNOSIS — K21.9 GASTROESOPHAGEAL REFLUX DISEASE, UNSPECIFIED WHETHER ESOPHAGITIS PRESENT: ICD-10-CM

## 2020-11-11 DIAGNOSIS — Z79.4 TYPE 2 DIABETES MELLITUS WITH OTHER SPECIFIED COMPLICATION, WITH LONG-TERM CURRENT USE OF INSULIN (H): ICD-10-CM

## 2020-11-11 DIAGNOSIS — F32.9 MAJOR DEPRESSIVE DISORDER, SINGLE EPISODE, UNSPECIFIED: ICD-10-CM

## 2020-11-11 DIAGNOSIS — L28.1 PRURIGO NODULARIS: ICD-10-CM

## 2020-11-11 DIAGNOSIS — S63.641A RUPTURE OF ULNAR COLLATERAL LIGAMENT OF RIGHT THUMB: ICD-10-CM

## 2020-11-11 DIAGNOSIS — H33.312 HORSESHOE TEAR OF RETINA OF LEFT EYE WITHOUT DETACHMENT: ICD-10-CM

## 2020-11-11 LAB
ALBUMIN SERPL-MCNC: 3.6 G/DL (ref 3.4–5)
ALBUMIN UR-MCNC: 10 MG/DL
ALP SERPL-CCNC: 132 U/L (ref 40–150)
ALT SERPL W P-5'-P-CCNC: 24 U/L (ref 0–70)
ANION GAP SERPL CALCULATED.3IONS-SCNC: 5 MMOL/L (ref 3–14)
APPEARANCE UR: CLEAR
AST SERPL W P-5'-P-CCNC: 14 U/L (ref 0–45)
BASOPHILS # BLD AUTO: 0.1 10E9/L (ref 0–0.2)
BASOPHILS NFR BLD AUTO: 0.6 %
BILIRUB SERPL-MCNC: 1 MG/DL (ref 0.2–1.3)
BILIRUB UR QL STRIP: NEGATIVE
BUN SERPL-MCNC: 13 MG/DL (ref 7–30)
CALCIUM SERPL-MCNC: 10 MG/DL (ref 8.5–10.1)
CHLORIDE SERPL-SCNC: 104 MMOL/L (ref 94–109)
CO2 SERPL-SCNC: 26 MMOL/L (ref 20–32)
COLOR UR AUTO: YELLOW
CREAT SERPL-MCNC: 0.82 MG/DL (ref 0.66–1.25)
DIFFERENTIAL METHOD BLD: NORMAL
EOSINOPHIL # BLD AUTO: 0.2 10E9/L (ref 0–0.7)
EOSINOPHIL NFR BLD AUTO: 1.5 %
ERYTHROCYTE [DISTWIDTH] IN BLOOD BY AUTOMATED COUNT: 12.4 % (ref 10–15)
GFR SERPL CREATININE-BSD FRML MDRD: >90 ML/MIN/{1.73_M2}
GLUCOSE BLDC GLUCOMTR-MCNC: 282 MG/DL (ref 70–99)
GLUCOSE BLDC GLUCOMTR-MCNC: 304 MG/DL (ref 70–99)
GLUCOSE BLDC GLUCOMTR-MCNC: 333 MG/DL (ref 70–99)
GLUCOSE SERPL-MCNC: 277 MG/DL (ref 70–99)
GLUCOSE UR STRIP-MCNC: >1000 MG/DL
HBA1C MFR BLD: 9.7 % (ref 0–5.6)
HCT VFR BLD AUTO: 44.9 % (ref 40–53)
HGB BLD-MCNC: 15.8 G/DL (ref 13.3–17.7)
HGB UR QL STRIP: NEGATIVE
IMM GRANULOCYTES # BLD: 0.1 10E9/L (ref 0–0.4)
IMM GRANULOCYTES NFR BLD: 0.7 %
KETONES UR STRIP-MCNC: 10 MG/DL
LACTATE BLD-SCNC: 1.1 MMOL/L (ref 0.7–2)
LEUKOCYTE ESTERASE UR QL STRIP: NEGATIVE
LIPASE SERPL-CCNC: 103 U/L (ref 73–393)
LYMPHOCYTES # BLD AUTO: 2.4 10E9/L (ref 0.8–5.3)
LYMPHOCYTES NFR BLD AUTO: 22.3 %
MCH RBC QN AUTO: 31.6 PG (ref 26.5–33)
MCHC RBC AUTO-ENTMCNC: 35.2 G/DL (ref 31.5–36.5)
MCV RBC AUTO: 90 FL (ref 78–100)
MONOCYTES # BLD AUTO: 0.7 10E9/L (ref 0–1.3)
MONOCYTES NFR BLD AUTO: 6.9 %
MUCOUS THREADS #/AREA URNS LPF: PRESENT /LPF
NEUTROPHILS # BLD AUTO: 7.3 10E9/L (ref 1.6–8.3)
NEUTROPHILS NFR BLD AUTO: 68 %
NITRATE UR QL: NEGATIVE
NRBC # BLD AUTO: 0 10*3/UL
NRBC BLD AUTO-RTO: 0 /100
PH UR STRIP: 5.5 PH (ref 5–7)
PLATELET # BLD AUTO: 393 10E9/L (ref 150–450)
POTASSIUM SERPL-SCNC: 3.6 MMOL/L (ref 3.4–5.3)
PROT SERPL-MCNC: 8.1 G/DL (ref 6.8–8.8)
RBC # BLD AUTO: 5 10E12/L (ref 4.4–5.9)
RBC #/AREA URNS AUTO: 1 /HPF (ref 0–2)
SODIUM SERPL-SCNC: 135 MMOL/L (ref 133–144)
SOURCE: ABNORMAL
SP GR UR STRIP: 1.02 (ref 1–1.03)
SQUAMOUS #/AREA URNS AUTO: <1 /HPF (ref 0–1)
UROBILINOGEN UR STRIP-MCNC: NORMAL MG/DL (ref 0–2)
WBC # BLD AUTO: 10.7 10E9/L (ref 4–11)
WBC #/AREA URNS AUTO: 2 /HPF (ref 0–5)

## 2020-11-11 PROCEDURE — 999N001017 HC STATISTIC GLUCOSE BY METER IP

## 2020-11-11 PROCEDURE — 83605 ASSAY OF LACTIC ACID: CPT | Performed by: EMERGENCY MEDICINE

## 2020-11-11 PROCEDURE — 83690 ASSAY OF LIPASE: CPT | Performed by: EMERGENCY MEDICINE

## 2020-11-11 PROCEDURE — 74177 CT ABD & PELVIS W/CONTRAST: CPT | Mod: 26 | Performed by: RADIOLOGY

## 2020-11-11 PROCEDURE — 258N000003 HC RX IP 258 OP 636: Performed by: STUDENT IN AN ORGANIZED HEALTH CARE EDUCATION/TRAINING PROGRAM

## 2020-11-11 PROCEDURE — 99207 PR NON-BILLABLE SERV PER CHARTING: CPT | Mod: GC | Performed by: SURGERY

## 2020-11-11 PROCEDURE — 99223 1ST HOSP IP/OBS HIGH 75: CPT | Mod: AI | Performed by: INTERNAL MEDICINE

## 2020-11-11 PROCEDURE — 250N000013 HC RX MED GY IP 250 OP 250 PS 637: Performed by: EMERGENCY MEDICINE

## 2020-11-11 PROCEDURE — 74177 CT ABD & PELVIS W/CONTRAST: CPT

## 2020-11-11 PROCEDURE — 99285 EMERGENCY DEPT VISIT HI MDM: CPT | Mod: 25 | Performed by: EMERGENCY MEDICINE

## 2020-11-11 PROCEDURE — 85025 COMPLETE CBC W/AUTO DIFF WBC: CPT | Performed by: EMERGENCY MEDICINE

## 2020-11-11 PROCEDURE — 80053 COMPREHEN METABOLIC PANEL: CPT | Performed by: EMERGENCY MEDICINE

## 2020-11-11 PROCEDURE — 120N000011 HC R&B TRANSPLANT UMMC

## 2020-11-11 PROCEDURE — 250N000013 HC RX MED GY IP 250 OP 250 PS 637: Performed by: STUDENT IN AN ORGANIZED HEALTH CARE EDUCATION/TRAINING PROGRAM

## 2020-11-11 PROCEDURE — C9803 HOPD COVID-19 SPEC COLLECT: HCPCS | Performed by: EMERGENCY MEDICINE

## 2020-11-11 PROCEDURE — 99285 EMERGENCY DEPT VISIT HI MDM: CPT | Performed by: EMERGENCY MEDICINE

## 2020-11-11 PROCEDURE — U0003 INFECTIOUS AGENT DETECTION BY NUCLEIC ACID (DNA OR RNA); SEVERE ACUTE RESPIRATORY SYNDROME CORONAVIRUS 2 (SARS-COV-2) (CORONAVIRUS DISEASE [COVID-19]), AMPLIFIED PROBE TECHNIQUE, MAKING USE OF HIGH THROUGHPUT TECHNOLOGIES AS DESCRIBED BY CMS-2020-01-R: HCPCS | Performed by: EMERGENCY MEDICINE

## 2020-11-11 PROCEDURE — 250N000012 HC RX MED GY IP 250 OP 636 PS 637: Performed by: STUDENT IN AN ORGANIZED HEALTH CARE EDUCATION/TRAINING PROGRAM

## 2020-11-11 PROCEDURE — 258N000003 HC RX IP 258 OP 636: Performed by: EMERGENCY MEDICINE

## 2020-11-11 PROCEDURE — 81001 URINALYSIS AUTO W/SCOPE: CPT | Performed by: EMERGENCY MEDICINE

## 2020-11-11 PROCEDURE — 96361 HYDRATE IV INFUSION ADD-ON: CPT | Performed by: EMERGENCY MEDICINE

## 2020-11-11 PROCEDURE — 83036 HEMOGLOBIN GLYCOSYLATED A1C: CPT | Performed by: EMERGENCY MEDICINE

## 2020-11-11 PROCEDURE — 250N000009 HC RX 250: Performed by: EMERGENCY MEDICINE

## 2020-11-11 PROCEDURE — 96360 HYDRATION IV INFUSION INIT: CPT | Mod: 59 | Performed by: EMERGENCY MEDICINE

## 2020-11-11 PROCEDURE — 250N000011 HC RX IP 250 OP 636: Performed by: EMERGENCY MEDICINE

## 2020-11-11 PROCEDURE — 250N000011 HC RX IP 250 OP 636: Performed by: STUDENT IN AN ORGANIZED HEALTH CARE EDUCATION/TRAINING PROGRAM

## 2020-11-11 RX ORDER — LIDOCAINE 40 MG/G
CREAM TOPICAL
Status: DISCONTINUED | OUTPATIENT
Start: 2020-11-11 | End: 2020-11-13 | Stop reason: HOSPADM

## 2020-11-11 RX ORDER — SODIUM CHLORIDE 9 MG/ML
INJECTION, SOLUTION INTRAVENOUS CONTINUOUS
Status: DISCONTINUED | OUTPATIENT
Start: 2020-11-11 | End: 2020-11-12

## 2020-11-11 RX ORDER — IOPAMIDOL 755 MG/ML
116 INJECTION, SOLUTION INTRAVASCULAR ONCE
Status: COMPLETED | OUTPATIENT
Start: 2020-11-11 | End: 2020-11-11

## 2020-11-11 RX ORDER — ONDANSETRON 2 MG/ML
4 INJECTION INTRAMUSCULAR; INTRAVENOUS EVERY 6 HOURS PRN
Status: DISCONTINUED | OUTPATIENT
Start: 2020-11-11 | End: 2020-11-13 | Stop reason: HOSPADM

## 2020-11-11 RX ORDER — ACETAMINOPHEN 325 MG/1
650 TABLET ORAL EVERY 4 HOURS
Status: DISCONTINUED | OUTPATIENT
Start: 2020-11-11 | End: 2020-11-11

## 2020-11-11 RX ORDER — NICOTINE POLACRILEX 4 MG
15-30 LOZENGE BUCCAL
Status: DISCONTINUED | OUTPATIENT
Start: 2020-11-11 | End: 2020-11-13 | Stop reason: HOSPADM

## 2020-11-11 RX ORDER — POLYETHYLENE GLYCOL 3350 17 G/17G
17 POWDER, FOR SOLUTION ORAL DAILY
Status: DISCONTINUED | OUTPATIENT
Start: 2020-11-11 | End: 2020-11-13 | Stop reason: HOSPADM

## 2020-11-11 RX ORDER — FAMOTIDINE 20 MG/1
20 TABLET, FILM COATED ORAL 2 TIMES DAILY
Status: DISCONTINUED | OUTPATIENT
Start: 2020-11-11 | End: 2020-11-13 | Stop reason: HOSPADM

## 2020-11-11 RX ORDER — PROCHLORPERAZINE 25 MG
25 SUPPOSITORY, RECTAL RECTAL EVERY 12 HOURS PRN
Status: DISCONTINUED | OUTPATIENT
Start: 2020-11-11 | End: 2020-11-13 | Stop reason: HOSPADM

## 2020-11-11 RX ORDER — ACETAMINOPHEN 500 MG
500 TABLET ORAL EVERY 6 HOURS PRN
Status: DISCONTINUED | OUTPATIENT
Start: 2020-11-11 | End: 2020-11-11

## 2020-11-11 RX ORDER — AMOXICILLIN 250 MG
2 CAPSULE ORAL 2 TIMES DAILY
Status: DISCONTINUED | OUTPATIENT
Start: 2020-11-11 | End: 2020-11-13 | Stop reason: HOSPADM

## 2020-11-11 RX ORDER — AMOXICILLIN 250 MG
1 CAPSULE ORAL 2 TIMES DAILY
Status: DISCONTINUED | OUTPATIENT
Start: 2020-11-11 | End: 2020-11-13 | Stop reason: HOSPADM

## 2020-11-11 RX ORDER — OXYCODONE HYDROCHLORIDE 5 MG/1
5 TABLET ORAL EVERY 6 HOURS PRN
Status: DISCONTINUED | OUTPATIENT
Start: 2020-11-11 | End: 2020-11-12

## 2020-11-11 RX ORDER — ONDANSETRON 4 MG/1
4 TABLET, ORALLY DISINTEGRATING ORAL EVERY 6 HOURS PRN
Status: DISCONTINUED | OUTPATIENT
Start: 2020-11-11 | End: 2020-11-13 | Stop reason: HOSPADM

## 2020-11-11 RX ORDER — PROCHLORPERAZINE MALEATE 5 MG
10 TABLET ORAL EVERY 6 HOURS PRN
Status: DISCONTINUED | OUTPATIENT
Start: 2020-11-11 | End: 2020-11-13 | Stop reason: HOSPADM

## 2020-11-11 RX ORDER — DEXTROSE MONOHYDRATE 25 G/50ML
25-50 INJECTION, SOLUTION INTRAVENOUS
Status: DISCONTINUED | OUTPATIENT
Start: 2020-11-11 | End: 2020-11-13 | Stop reason: HOSPADM

## 2020-11-11 RX ADMIN — INSULIN GLARGINE 15 UNITS: 100 INJECTION, SOLUTION SUBCUTANEOUS at 21:49

## 2020-11-11 RX ADMIN — IOPAMIDOL 116 ML: 755 INJECTION, SOLUTION INTRAVENOUS at 10:26

## 2020-11-11 RX ADMIN — INSULIN ASPART 4 UNITS: 100 INJECTION, SOLUTION INTRAVENOUS; SUBCUTANEOUS at 17:12

## 2020-11-11 RX ADMIN — SODIUM CHLORIDE: 9 INJECTION, SOLUTION INTRAVENOUS at 14:16

## 2020-11-11 RX ADMIN — ONDANSETRON 4 MG: 4 TABLET, ORALLY DISINTEGRATING ORAL at 20:56

## 2020-11-11 RX ADMIN — DEXTROSE AND SODIUM CHLORIDE: 5; 900 INJECTION, SOLUTION INTRAVENOUS at 16:22

## 2020-11-11 RX ADMIN — FAMOTIDINE 20 MG: 20 TABLET ORAL at 20:56

## 2020-11-11 RX ADMIN — OXYCODONE HYDROCHLORIDE 5 MG: 5 TABLET ORAL at 20:56

## 2020-11-11 RX ADMIN — DOCUSATE SODIUM 50 MG AND SENNOSIDES 8.6 MG 2 TABLET: 8.6; 5 TABLET, FILM COATED ORAL at 20:56

## 2020-11-11 RX ADMIN — SODIUM CHLORIDE 1000 ML: 9 INJECTION, SOLUTION INTRAVENOUS at 12:31

## 2020-11-11 RX ADMIN — INSULIN ASPART 4 UNITS: 100 INJECTION, SOLUTION INTRAVENOUS; SUBCUTANEOUS at 21:49

## 2020-11-11 RX ADMIN — LIDOCAINE HYDROCHLORIDE 30 ML: 20 SOLUTION ORAL; TOPICAL at 09:49

## 2020-11-11 ASSESSMENT — ENCOUNTER SYMPTOMS
DIFFICULTY URINATING: 0
COUGH: 0
SHORTNESS OF BREATH: 0
FREQUENCY: 0
ABDOMINAL PAIN: 1
HEMATURIA: 0
BLOOD IN STOOL: 1
FEVER: 0
VOMITING: 1
DYSURIA: 0

## 2020-11-11 ASSESSMENT — MIFFLIN-ST. JEOR: SCORE: 1614.2

## 2020-11-11 NOTE — ED TRIAGE NOTES
"Pt comes in for mid/left burning abdominal pain and vomiting that started at 5 this morning and is unsteady on feet. \"This happens to me all the time\". Pt last ate can of soup at friends house at 4:30AM. Pt homeless.  "

## 2020-11-11 NOTE — ED PROVIDER NOTES
ED Provider Note  [facility]      History     Chief Complaint   Patient presents with     Abdominal Pain     Nausea & Vomiting     The history is provided by the patient and medical records.     Andrew Lockwood is a 52 year old male with a complex past medical history including HIV (last cd4 362 on 8/6/20) on HAART therapy, DM2, SBO, CNS toxoplasmosis, anal dysplasia, kidney stone, GERD, and penicillin allergy who presents to the ED today for evaluation of abdominal pain, nausea, and vomiting. He reports that he began having a burning pain on the left side of his abdomen around 5 am. He reports vomiting 9x. He reports trace blood in his stool, but attributes this to his recent anal biopsy. Last BM was last night at 8pm and otherwise have been normal.  He ate some soup at 4 am and pain started shortly after this. He has not taken anything for the pain. He denies fever, urinary symptoms, cough, shortness of breath, and alcohol or drug use. He has had a previous appendectomy and numerous SBO with possible stricture of the small bowel which has not been resected.  He is homeless and is unsure of any ill contacts.     Past Medical History  Past Medical History:   Diagnosis Date     AIDS (H)      Allergic rhinitis due to other allergen     DNS     Anal dysplasia      Chronic abdominal pain      CNS toxoplasmosis (H)      Diabetes type 2, controlled (H)      GERD (gastroesophageal reflux disease)      History of seizure      History of substance abuse (H)      HIV (human immunodeficiency virus infection) (H)      HLD (hyperlipidemia)      Lung nodules      Periungual wart      PTSD (post-traumatic stress disorder)      Sleep apnea     doesn't use CPAP     Past Surgical History:   Procedure Laterality Date     ANOSCOPY N/A 9/9/2020    Procedure: Exam Under Anesthesia, ANOSCOPY, fulgeration of rectal fissures with Rectal Biopsies;  Surgeon: Thanh Lundberg MD;  Location: [facility] OR     COLONOSCOPY Left  1/22/2016    Procedure: COMBINED COLONOSCOPY, SINGLE OR MULTIPLE BIOPSY/POLYPECTOMY BY BIOPSY;  Surgeon: Clark Saini MD;  Location: [facility] GI     HC EXPLORE UNDESC TESTIS,INGUIN/SCROTAL       LAPAROSCOPIC APPENDECTOMY N/A 1/31/2018    Procedure: LAPAROSCOPIC APPENDECTOMY;  LAPAROSCOPIC APPENDECTOMY;  Surgeon: Dawn Holt MD;  Location: [facility] OR     LAPAROSCOPY DIAGNOSTIC (GENERAL) N/A 7/26/2016    Procedure: LAPAROSCOPY DIAGNOSTIC (GENERAL);  Surgeon: Susannah Arriaga MD;  Location: [facility]     LAPAROSCOPY DIAGNOSTIC (GENERAL) N/A 4/16/2018    Procedure: LAPAROSCOPY DIAGNOSTIC (GENERAL);  Diagnostic laparoscopy and lysis of adhesions;  Surgeon: Prince Dowling MD;  Location: [facility] OR     OPTICAL TRACKING SYSTEM CRANIOTOMY, EXCISE TUMOR, COMBINED Left 4/10/2015    Procedure: COMBINED OPTICAL TRACKING SYSTEM CRANIOTOMY, EXCISE TUMOR;  Surgeon: Mirlande Colmenares MD;  Location: [facility] OR     REPAIR GAMEKEEPER'S THUMB Right 12/2/2016    Procedure: REPAIR LIGAMENT ULNAR COLLATERAL THUMB (GAMEKEEPER'S);  Surgeon: Evin Zamorano MD;  [facility] OR     ZZC NONSPECIFIC PROCEDURE      right forearm fracture          acetaminophen (TYLENOL) 500 MG tablet       benzocaine-menthol (CEPACOL) 15-3.6 MG lozenge       bictegravir-emtricitabine-tenofovir (BIKTARVY) -25 MG per tablet       blood glucose (NO BRAND SPECIFIED) lancets standard       blood glucose (NO BRAND SPECIFIED) test strip       blood glucose monitoring (NO BRAND SPECIFIED) meter device kit       cefpodoxime (VANTIN) 100 MG tablet       docusate sodium (COLACE) 50 MG capsule       famotidine (PEPCID) 20 MG tablet       insulin glargine (BASAGLAR KWIKPEN) 100 UNIT/ML pen       insulin pen needle (B-D U/F) 31G X 8 MM miscellaneous       insulin pen needle (BD SCOTT U/F) 32G X 4 MM miscellaneous       methylPREDNISolone (MEDROL DOSEPAK) 4 MG tablet therapy pack       naproxen (NAPROSYN) 500 MG tablet       NOVOLOG FLEXPEN 100 UNIT/ML  "soln       Nutritional Supplements (GLUCOSE MANAGEMENT) TABS       polyethylene glycol (MIRALAX) powder       psyllium (METAMUCIL SMOOTH TEXTURE) 28 % packet       SENNA-docusate sodium (SENNA S) 8.6-50 MG tablet       sennosides (SENOKOT) 8.6 MG tablet       STATIN NOT PRESCRIBED (INTENTIONAL)      Allergies   Allergen Reactions     Metformin      Abdominal pain     Milk [Lac Bovis] Hives     Tylenol [Acetaminophen] Itching     Dulaglutide Rash     Penicillin V Other (See Comments) and Rash     Diffuse maculopapular rash + feels \"high\", per pt.      Family History  Family History   Problem Relation Age of Onset     Diabetes Brother      Diabetes Father      Alzheimer Disease Father      Unknown/Adopted Mother      Diabetes Paternal Grandfather      Cancer No family hx of         no skin cancer     Skin Cancer No family hx of         no famiy hx of skin cancer     Glaucoma No family hx of      Macular Degeneration No family hx of      Social History   Social History     Tobacco Use     Smoking status: Current Some Day Smoker     Packs/day: 0.50     Years: 40.00     Pack years: 20.00     Types: Cigarettes     Smokeless tobacco: Former User   Substance Use Topics     Alcohol use: No     Alcohol/week: 0.0 standard drinks     Drinks per session: Patient refused     Comment: Last etoh in 2007     Drug use: Not Currently     Types: Marijuana, Methamphetamines     Comment: Sober 9 months       Past medical history, past surgical history, medications, allergies, family history, and social history were reviewed with the patient. No additional pertinent items.    Past Medical History:   Diagnosis Date     AIDS (H)      Allergic rhinitis due to other allergen     DNS     Anal dysplasia      Chronic abdominal pain      CNS toxoplasmosis (H)      Diabetes type 2, controlled (H)      GERD (gastroesophageal reflux disease)      History of seizure      History of substance abuse (H)      HIV (human immunodeficiency virus infection) " (H)      HLD (hyperlipidemia)      Lung nodules      Periungual wart      PTSD (post-traumatic stress disorder)      Sleep apnea     doesn't use CPAP       Past Surgical History:   Procedure Laterality Date     ANOSCOPY N/A 9/9/2020    Procedure: Exam Under Anesthesia, ANOSCOPY, fulgeration of rectal fissures with Rectal Biopsies;  Surgeon: Thanh Lundberg MD;  [facility]     COLONOSCOPY Left 1/22/2016    Procedure: COMBINED COLONOSCOPY, SINGLE OR MULTIPLE BIOPSY/POLYPECTOMY BY BIOPSY;  Surgeon: Clark Saini MD;  [facility]: [facility]     HC EXPLORE UNDESC TESTIS,INGUIN/SCROTAL       LAPAROSCOPIC APPENDECTOMY N/A 1/31/2018    Procedure: LAPAROSCOPIC APPENDECTOMY;  LAPAROSCOPIC APPENDECTOMY;  Surgeon: Dawn Holt MD;  [facility]     LAPAROSCOPY DIAGNOSTIC (GENERAL) N/A 7/26/2016    Procedure: LAPAROSCOPY DIAGNOSTIC (GENERAL);  Surgeon: Susannah Arriaga MD;  [facility]     LAPAROSCOPY DIAGNOSTIC (GENERAL) N/A 4/16/2018    Procedure: LAPAROSCOPY DIAGNOSTIC (GENERAL);  Diagnostic laparoscopy and lysis of adhesions;  Surgeon: Prince Dowling MD;  Location: [facility]     OPTICAL TRACKING SYSTEM CRANIOTOMY, EXCISE TUMOR, COMBINED Left 4/10/2015    Procedure: COMBINED OPTICAL TRACKING SYSTEM CRANIOTOMY, EXCISE TUMOR;  Surgeon: Mirlande Colmenares MD;  [facility]     REPAIR GAMEKEEPER'S THUMB Right 12/2/2016    Procedure: REPAIR LIGAMENT ULNAR COLLATERAL THUMB (GAMEKEEPER'S);  Surgeon: Evin Zamorano MD;  Location:  OR     [facility] NONSPECIFIC PROCEDURE      right forearm fracture       Family History   Problem Relation Age of Onset     Diabetes Brother      Diabetes Father      Alzheimer Disease Father      Unknown/Adopted Mother      Diabetes Paternal Grandfather      Cancer No family hx of         no skin cancer     Skin Cancer No family hx of         no famiy hx of skin cancer     Glaucoma No family hx of      Macular Degeneration No family hx of        Social History  "    Tobacco Use     Smoking status: Current Some Day Smoker     Packs/day: 0.50     Years: 40.00     Pack years: 20.00     Types: Cigarettes     Smokeless tobacco: Former User   Substance Use Topics     Alcohol use: No     Alcohol/week: 0.0 standard drinks     Drinks per session: Patient refused     Comment: Last etoh in 2007     Current Facility-Administered Medications   Medication     lidocaine (XYLOCAINE) 2 % 15 mL, alum & mag hydroxide-simethicone (MAALOX) 15 mL GI Cocktail     Current Outpatient Medications   Medication     acetaminophen (TYLENOL) 500 MG tablet     benzocaine-menthol (CEPACOL) 15-3.6 MG lozenge     bictegravir-emtricitabine-tenofovir (BIKTARVY) -25 MG per tablet     blood glucose (NO BRAND SPECIFIED) lancets standard     blood glucose (NO BRAND SPECIFIED) test strip     blood glucose monitoring (NO BRAND SPECIFIED) meter device kit     cefpodoxime (VANTIN) 100 MG tablet     docusate sodium (COLACE) 50 MG capsule     famotidine (PEPCID) 20 MG tablet     insulin glargine (BASAGLAR KWIKPEN) 100 UNIT/ML pen     insulin pen needle (B-D U/F) 31G X 8 MM miscellaneous     insulin pen needle (BD SCOTT U/F) 32G X 4 MM miscellaneous     methylPREDNISolone (MEDROL DOSEPAK) 4 MG tablet therapy pack     naproxen (NAPROSYN) 500 MG tablet     NOVOLOG FLEXPEN 100 UNIT/ML soln     Nutritional Supplements (GLUCOSE MANAGEMENT) TABS     polyethylene glycol (MIRALAX) powder     psyllium (METAMUCIL SMOOTH TEXTURE) 28 % packet     SENNA-docusate sodium (SENNA S) 8.6-50 MG tablet     sennosides (SENOKOT) 8.6 MG tablet     STATIN NOT PRESCRIBED (INTENTIONAL)        Allergies   Allergen Reactions     Metformin      Abdominal pain     Milk [Lac Bovis] Hives     Tylenol [Acetaminophen] Itching     Dulaglutide Rash     Penicillin V Other (See Comments) and Rash     Diffuse maculopapular rash + feels \"high\", per pt.         Review of Systems   Constitutional: Negative for fever.   Respiratory: Negative for cough and " "shortness of breath.    Gastrointestinal: Positive for abdominal pain, blood in stool and vomiting.   Genitourinary: Negative for difficulty urinating, dysuria, frequency, hematuria and urgency.   All other systems reviewed and are negative.    A complete review of systems was performed with pertinent positives and negatives noted in the HPI, and all other systems negative.    Physical Exam   BP: 119/80  Pulse: 111  Temp: 99  F (37.2  C)  Resp: 18  Height: 162.6 cm (5' 4\")  Weight: 86.3 kg (190 lb 4.8 oz)  SpO2: 97 %  Physical Exam  General: patient is alert and oriented and in no acute distress   Head: atraumatic and normocephalic   EENT: moist mucus membranes, sclera anicteric  Neck: supple   Cardiovascular: regular rate and rhythm, extremities warm and well perfused, no lower extremity edema  Pulmonary: lungs clear to auscultation bilaterally   Abdomen: soft, non-distended, TTP in the LUQ without guarding, no CVA TTP  Musculoskeletal: normal range of motion   Neurological: alert and oriented, moving all extremities symmetrically, gait normal   Skin: warm, dry     ED Course      Procedures                         No results found for any visits on 11/11/20.  Medications - No data to display     Assessments & Plan (with Medical Decision Making)   Ms. Lockwood is a 52 year old male with a complex past medical history including HIV (last cd4 362 on 8/6/20) on HAART therapy, DM2, SBO, CNS toxoplasmosis, anal dysplasia, kidney stone, GERD who presents with abdominal pain, nausea and vomiting.  Differential diagnosis includes but is not limited to: Gastritis, GERD, peptic ulcer disease, recurrent bowel obstruction, diverticulitis, biliary disease, constipation, splenic infarct, DKA.  Baseline labs obtained and notable for hyperglycemia without evidence of DKA.  He has no leukocytosis and normal hemoglobin.  Lactate is also normal at 1.1.  Electrolytes are within normal limits.  No elevation in LFTs.  He did go for a CT of " the abdomen which shows a partial mid to distal small bowel obstruction likely secondary to adhesions.   He was given a GI cocktail and on reevaluation is feeling improved and does not have ongoing nausea or vomiting.  I did discuss with general surgery and will plan for conservative management at this time.  Patient will be admitted to medicine for IV fluids and further management.     I have reviewed the nursing notes. I have reviewed the findings, diagnosis, plan and need for follow up with the patient.    New Prescriptions    No medications on file       Final diagnoses:   Small bowel obstruction (H)   I, Katalina Hebert, am serving as a trained medical scribe to document services personally performed by Sharlene Ji MD, based on the provider's statements to me.     I, Sharlene Ji MD, was physically present and have reviewed and verified the accuracy of this note documented by Katalina Hebert.      --  Sharlene Ji MD  [facility] EMERGENCY DEPARTMENT  11/11/2020     Sharlene Ji MD  11/11/20 7395

## 2020-11-11 NOTE — ED NOTES
Per medicine team, do not need to place at NG tube at this time. If patient worsens or has frequent vomiting, NG should be placed.

## 2020-11-11 NOTE — ED NOTES
"[facility]    ED Nurse to Floor Handoff     Andrew Lockwood is a 52 year old male who speaks English and lives with family members,  in a home  They arrived in the ED by car from home    ED Chief Complaint: Abdominal Pain and Nausea & Vomiting    ED Dx;   Final diagnoses:   Small bowel obstruction (H)         Needed?: No    Allergies:   Allergies   Allergen Reactions     Metformin      Abdominal pain     Milk [Lac Bovis] Hives     Tylenol [Acetaminophen] Itching     Dulaglutide Rash     Penicillin V Other (See Comments) and Rash     Diffuse maculopapular rash + feels \"high\", per pt.    .  Past Medical Hx:   Past Medical History:   Diagnosis Date     AIDS (H)      Allergic rhinitis due to other allergen     DNS     Anal dysplasia      Chronic abdominal pain      CNS toxoplasmosis (H)      Diabetes type 2, controlled (H)      GERD (gastroesophageal reflux disease)      History of seizure      History of substance abuse (H)      HIV (human immunodeficiency virus infection) (H)      HLD (hyperlipidemia)      Lung nodules      Periungual wart      PTSD (post-traumatic stress disorder)      Sleep apnea     doesn't use CPAP      Baseline Mental status: WDL  Current Mental Status changes: at basesline    Infection present or suspected this encounter: no  Sepsis suspected: No  Isolation type: Contact  Patient tested for COVID 19 prior to admission: YES     Activity level - Baseline/Home:  Independent  Activity Level - Current:   Independent    Bariatric equipment needed?: No    In the ED these meds were given:   Medications   0.9% sodium chloride BOLUS (0 mLs Intravenous Stopped 11/11/20 1416)     Followed by   sodium chloride 0.9% infusion ( Intravenous New Bag 11/11/20 1416)   lidocaine (XYLOCAINE) 2 % 15 mL, alum & mag hydroxide-simethicone (MAALOX) 15 mL GI Cocktail (30 mLs Oral Given 11/11/20 6561)   iopamidol (ISOVUE-370) solution 116 mL (116 mLs Intravenous Given " 11/11/20 1026)   sodium chloride (PF) 0.9% PF flush 79 mL (79 mLs Intravenous Given 11/11/20 1026)       Drips running?  Saline 125mL/hr    Home pump  No    Current LDAs  Peripheral IV 11/11/20 Left Other (Comment) (Active)   Site Assessment WDL 11/11/20 0918   Line Status Saline locked 11/11/20 0918   Dressing Intervention New dressing  11/11/20 0918   Phlebitis Scale 0-->no symptoms 11/11/20 0918   Number of days: 0       Incision/Surgical Site 09/09/20 Inner;Bilateral Rectum (Active)   Number of days: 63       Labs results:   Labs Ordered and Resulted from Time of ED Arrival Up to the Time of Departure from the ED   COMPREHENSIVE METABOLIC PANEL - Abnormal; Notable for the following components:       Result Value    Glucose 277 (*)     All other components within normal limits   ROUTINE UA WITH MICROSCOPIC - Abnormal; Notable for the following components:    Glucose Urine >1000 (*)     Ketones Urine 10 (*)     Protein Albumin Urine 10 (*)     Mucous Urine Present (*)     All other components within normal limits   GLUCOSE BY METER - Abnormal; Notable for the following components:    Glucose 282 (*)     All other components within normal limits   CBC WITH PLATELETS DIFFERENTIAL   LIPASE   LACTIC ACID WHOLE BLOOD   COVID-19 VIRUS (CORONAVIRUS) BY PCR       Imaging Studies:   Recent Results (from the past 24 hour(s))   CT Abdomen Pelvis w Contrast    Narrative    EXAMINATION: CT ABDOMEN PELVIS W CONTRAST, 11/11/2020 10:37 AM    TECHNIQUE:  Helical CT images from the lung bases through the  symphysis pubis were obtained with IV contrast. Contrast dose:  iopamidol (ISOVUE-370) solution 116 mL    COMPARISON: 6/25/2020 CT    HISTORY: Abd pain, acute, generalized; Left sided abdominal pain,  vomiting, rule out obstruction, diverticulitis    FINDINGS:    Abdomen and pelvis: Focal fat deposition along the falciform ligament.  No focal suspicious hepatic lesion. Cholelithiasis. No gallbladder  wall thickening or  "pericholecystic fluid. The biliary tree, pancreas,  spleen, adrenal glands, and renal cortices are within normal limits.  No hydronephrosis, hydroureter, or urinary tract stone. The bladder is  within normal limits. Mild prostatomegaly. Symmetric seminal vesicles.  Penile prosthesis with reservoir in the extraperitoneal pelvis along  the anterior left aspect of the bladder. No free fluid or free air.  Several loops of dilated mid to distal small bowel with air-fluid  levels in the low central abdomen with mild focal transition to  decompressed distal small bowel in the anterior right mid abdomen  (series 5 images 253-234). No pneumatosis or portal venous gas.  Surgically absent appendix. Scattered colonic diverticula. The major  abdominal vasculature is patent. Mild aortoiliac atherosclerotic  calcification without aneurysmal dilation. No abdominal or pelvic  lymphadenopathy.    Lung bases/lower chest:  5 mm nodule in the right middle lobe (series  5 image 24), unchanged. No pleural or pericardial effusion.    Bones and soft tissues: No acute or aggressive osseus abnormality.      Impression    IMPRESSION:   1. Mild partial mid to distal small bowel obstruction, likely  adhesive. No pneumatosis or portal venous gas.   2. Cholelithiasis.    JOSE VITAL, DO       Recent vital signs:   /72   Pulse 92   Temp 99  F (37.2  C) (Oral)   Resp 18   Ht 1.626 m (5' 4\")   Wt 86.3 kg (190 lb 4.8 oz)   SpO2 98%   BMI 32.66 kg/m      Sheyla Coma Scale Score: 15 (11/11/20 1129)       Cardiac Rhythm: Normal Sinus  Pt needs tele? No  Skin/wound Issues: None    Code Status: Full Code    Pain control: fair    Nausea control: pt had none    Abnormal labs/tests/findings requiring intervention: CT    Family present during ED course? No   Family Comments/Social Situation comments:     Tasks needing completion: None    Audrey Storm, RN  [phone] [facility]    "

## 2020-11-11 NOTE — CONSULTS
"[facility] Surgery Consultation    Andrew Lockwood MRN# [id number]   Age: 52 year old YOB: 1967     Date of Admission:  11/11/2020    Date of Consult:   11/11/20    Reason for consult: Abdominal pain  Small bowel obstruction       Requesting service: Dr. Bah; requesting provider: ED                   Assessment and Plan:   Assessment:   Andrew Lockwood is a 52 year old male with a past medical history including AIDS (last cd4 362 on 8/6/20) on HAART therapy, DM2, multiple SBO, CNS toxoplasmosis, anal dysplasia, kidney stone, GERD, and penicillin allergy; surgical history of penile prosthesis 6/2014, ex lap without adhesions and multiple segments of dilated bowel in 7/2016, appendectomy 1/2018 complicated by abscess, and ex lap in 2018 with multiple adhesion take down,  who presents to the ED today for evaluation of abdominal pain, nausea, and vomiting. CT is consistent with small bowel obstruction (\"loops of small bowel... with mild focal transition to decompressed distal small bowel in the anterior right mid abdomen\"), abdomen is not distended and there are no peritoneal signs.         Plan:   - Serial abdominal exams  - Abdominal exam very reassuring at this time, would not operate  - NG tube, NPO  - Pain and nausea management per primary team    Discussed with staff, Dr. Prince Dowling             Chief Complaint:   Abdominal pain  Small bowel obstruction         History of Present Illness:     Andrew Lockwood is a 52 year old male with a past medical history including AIDS (last cd4 362 on 8/6/20) on HAART therapy, DM2, multiple SBO, CNS toxoplasmosis, anal dysplasia, kidney stone, GERD, and penicillin allergy; surgical history of penile prosthesis 6/2014, ex lap without adhesions and multiple segments of dilated bowel in 7/2016, appendectomy 1/2018 complicated by abscess, and ex lap in 2018 with multiple adhesion take down,  who presents to the ED today for evaluation of abdominal pain, " "nausea, and vomiting. He states that he has had 17+ SBO episodes, most of which have not required surgery but have been managed with NG tube, being NPO, and pain control. These started prior to his appendectomy in 2018, although since he states they have been because of adhesions in his stomach. His last episode was in the spring (per chart review in April). Patient's last bowel movement was a loose bm on the night of 11/9, no blood per his report. He has since had slowly increasing abdominal pain and some bloating, no passing flatus. He last ate soup last on the night of 11/10 with subsequent copious vomiting (he estimates x9); he denies blood or black in his vomit. He presented to the ED when he was unable to stop vomiting or tolerate food or drink. His labs are significant for WBC 10.7, CT is consistent with small bowel obstruction (\"loops of small bowel... with mild focal transition to decompressed distal small bowel in the anterior right mid abdomen\"). He is admitted to the medicine service for non-surgical management of his symptoms, surgery has been consulted for possible surgical intervention given SBO on CT and should his symptoms progress.           Past Medical History:     Past Medical History:   Diagnosis Date     AIDS (H)      Allergic rhinitis due to other allergen     DNS     Anal dysplasia      Chronic abdominal pain      CNS toxoplasmosis (H)      Diabetes type 2, controlled (H)      GERD (gastroesophageal reflux disease)      History of seizure      History of substance abuse (H)      HIV (human immunodeficiency virus infection) (H)      HLD (hyperlipidemia)      Lung nodules      Periungual wart      PTSD (post-traumatic stress disorder)      Sleep apnea     doesn't use CPAP             Past Surgical History:     Past Surgical History:   Procedure Laterality Date     ANOSCOPY N/A 9/9/2020    Procedure: Exam Under Anesthesia, ANOSCOPY, fulgeration of rectal fissures with Rectal Biopsies;  Surgeon: " Thanh Lundberg MD;  Location: [facility] OR     COLONOSCOPY Left 1/22/2016    Procedure: COMBINED COLONOSCOPY, SINGLE OR MULTIPLE BIOPSY/POLYPECTOMY BY BIOPSY;  Surgeon: Clark Saini MD;  Location: [facility] GI     HC EXPLORE UNDESC TESTIS,INGUIN/SCROTAL       LAPAROSCOPIC APPENDECTOMY N/A 1/31/2018    Procedure: LAPAROSCOPIC APPENDECTOMY;  LAPAROSCOPIC APPENDECTOMY;  Surgeon: Dawn Holt MD;  Location: [facility] OR     LAPAROSCOPY DIAGNOSTIC (GENERAL) N/A 7/26/2016    Procedure: LAPAROSCOPY DIAGNOSTIC (GENERAL);  Surgeon: Susannah Arriaga MD;  Location: [facility] OR     LAPAROSCOPY DIAGNOSTIC (GENERAL) N/A 4/16/2018    Procedure: LAPAROSCOPY DIAGNOSTIC (GENERAL);  Diagnostic laparoscopy and lysis of adhesions;  Surgeon: Prince Dowling MD;  Location: [facility] OR     OPTICAL TRACKING SYSTEM CRANIOTOMY, EXCISE TUMOR, COMBINED Left 4/10/2015    Procedure: COMBINED OPTICAL TRACKING SYSTEM CRANIOTOMY, EXCISE TUMOR;  Surgeon: Mirlande Colmenares MD;  Location: [facility] OR     REPAIR GAMEKEEPER'S THUMB Right 12/2/2016    Procedure: REPAIR LIGAMENT ULNAR COLLATERAL THUMB (GAMEKEEPER'S);  Surgeon: Evin Zamorano MD;  Location: [facility] OR     ZZC NONSPECIFIC PROCEDURE      right forearm fracture             Social History:     Social History     Tobacco Use     Smoking status: Current Some Day Smoker     Packs/day: 0.50     Years: 40.00     Pack years: 20.00     Types: Cigarettes     Smokeless tobacco: Former User   Substance Use Topics     Alcohol use: No     Alcohol/week: 0.0 standard drinks     Drinks per session: Patient refused     Comment: Last etoh in 2007             Family History:     Family History   Problem Relation Age of Onset     Diabetes Brother      Diabetes Father      Alzheimer Disease Father      Unknown/Adopted Mother      Diabetes Paternal Grandfather      Cancer No family hx of         no skin cancer     Skin Cancer No family hx of         no famiy hx of skin cancer     Glaucoma No family hx of       "Macular Degeneration No family hx of                 Allergies:     Allergies   Allergen Reactions     Metformin      Abdominal pain     Milk [Lac Bovis] Hives     Tylenol [Acetaminophen] Itching     Dulaglutide Rash     Penicillin V Other (See Comments) and Rash     Diffuse maculopapular rash + feels \"high\", per pt.              Medications:     Current Facility-Administered Medications   Medication     0.9% sodium chloride BOLUS    Followed by     sodium chloride 0.9% infusion     Current Outpatient Medications   Medication Sig     acetaminophen (TYLENOL) 500 MG tablet Take 1 tablet (500 mg) by mouth every 6 hours as needed for mild pain     benzocaine-menthol (CEPACOL) 15-3.6 MG lozenge Place 1 lozenge inside cheek every hour as needed for sore throat     bictegravir-emtricitabine-tenofovir (BIKTARVY) -25 MG per tablet Take 1 tablet by mouth daily (Patient taking differently: Take 1 tablet by mouth every morning )     blood glucose (NO BRAND SPECIFIED) lancets standard Use to test blood sugar four times daily or as directed.     blood glucose (NO BRAND SPECIFIED) test strip 1 strip by In Vitro route 4 times daily (before meals and nightly) Use to test blood sugars 4 times daily or as directed     blood glucose monitoring (NO BRAND SPECIFIED) meter device kit Use to test blood sugar 4 times daily or as directed.     cefpodoxime (VANTIN) 100 MG tablet Take 2 tablets (200 mg) by mouth 2 times daily     docusate sodium (COLACE) 50 MG capsule Take 2 capsules (100 mg) by mouth 2 times daily as needed for constipation     famotidine (PEPCID) 20 MG tablet Take 1 tablet (20 mg) by mouth 2 times daily     insulin glargine (BASAGLAR KWIKPEN) 100 UNIT/ML pen Inject 35 Units Subcutaneous 2 times daily     insulin pen needle (B-D U/F) 31G X 8 MM miscellaneous Use 1 pen needles daily or as directed.     insulin pen needle (BD SCOTT U/F) 32G X 4 MM miscellaneous USE 3 TO 4 NEEDLES DAILY     methylPREDNISolone (MEDROL " DOSEPAK) 4 MG tablet therapy pack Follow Package Directions     naproxen (NAPROSYN) 500 MG tablet Take 1 tablet (500 mg) by mouth 2 times daily (with meals)     NOVOLOG FLEXPEN 100 UNIT/ML soln Inject 20 units with each meal, along with sliding scale 1 unit per 25mg/dL over 140 before meals and over 200 at bedtime. Max daily dose 90 units     Nutritional Supplements (GLUCOSE MANAGEMENT) TABS Use 1-2 tabs for hypoglycemia.     polyethylene glycol (MIRALAX) powder Take 17 g (1 capful) by mouth daily as needed for constipation     psyllium (METAMUCIL SMOOTH TEXTURE) 28 % packet Use one packet in 8oz water daily.     SENNA-docusate sodium (SENNA S) 8.6-50 MG tablet Take 1 tablet by mouth 2 times daily Hold for loose stools.     sennosides (SENOKOT) 8.6 MG tablet Take 1 tablet by mouth 2 times daily as needed for constipation     STATIN NOT PRESCRIBED (INTENTIONAL) Please choose reason not prescribed, below               Review of Systems:   CONSTITUTIONAL: NEGATIVE for fever, chills, change in weight  CV: NEGATIVE for chest pain, palpitations or peripheral edema  C: NEGATIVE for fever, chills, change in weight  E/M: NEGATIVE for ear, mouth and throat problems  R: NEGATIVE for significant cough or SOB          Physical Exam:   All vitals have been reviewed  Temp:  [99  F (37.2  C)] 99  F (37.2  C)  Pulse:  [] 102  Resp:  [18] 18  BP: (119-142)/() 130/101  SpO2:  [93 %-99 %] 98 %  No intake or output data in the 24 hours ending 11/11/20 1300  Physical Exam:  Vital signs reviewed, nursing note reviewed.  Constitutional: Age appropriate human, laying in bed, mildly uncomfortable  Cardiac: Regular rate and rhythm, cap refill <2 sec  Resp: Speaking in full sentences on room air, no respiratory distress, lungs CTAB  Abd: Abdomen soft, minimally distended, bowel sounds present in all quadrants, minimally tender in LLQ and LUQ. Tolerated deep palpation of all quadrants.   Skin: Warm, dry  Neuro: Alert and oriented  x4, movements coordinated and symmetric  Psych: No acute distress            Data:   All laboratory data reviewed    Results:  BMP  Recent Labs   Lab 11/11/20 0919      POTASSIUM 3.6   CHLORIDE 104   CO2 26   BUN 13   CR 0.82   *     CBC  Recent Labs   Lab 11/11/20 0919   WBC 10.7   HGB 15.8        LFT  Recent Labs   Lab 11/11/20 0919   AST 14   ALT 24   ALKPHOS 132   BILITOTAL 1.0   ALBUMIN 3.6     Recent Labs   Lab 11/11/20 0919 11/11/20 0902   *  --    BGM  --  282*       Imaging:  CT 11/11: IMPRESSION:   1. Mild partial mid to distal small bowel obstruction, likely  adhesive. No pneumatosis or portal venous gas.   2. Cholelithiasis.

## 2020-11-11 NOTE — H&P
[facility]     History and Physical - [facility] Service        Date of Admission:  11/11/2020    Assessment & Plan   Andrew Lockwood is a 52 year old male admitted on 11/11/2020. He has a history of HIV/AIDS on HAART therapy, DM2, CNS toxoplasmosis, seizures, HLD, and several episodes of recurrent SBOs who presented with x1 day of sudden onset abdominal pain with emesis found to have SBO.     #Abdominal pain  #Recurrent SBO   Presents with sudden onset LLQ abdominal pain x1 day with >9 episodes of emesis and inability to keep down PO. Last BM was night prior to admission. Hemodynamically stable and afebrile on presentation. Admission labs notable for normal BMP/CBC and lactate WNL. Abdominal exam benign with mild LLQ tenderness to palpation without any pain medications. CT abdomen is remarkable for a mild partial mid to distal small bowel obstruction though likely to be adhesive. He does have reported history of numerous similar episodes/SBOs since 2016 and per chart review, has had ex lap (prior lysis of adhesions) and appendectomy.  Patient was evaluated by surgery in the ED on presentation who recommended conservative management with NG decompression and pain control for now. Given he is no longer vomiting will hold off on NG unless he has repeat emesis. S/p 1 L NS bolus in the ED.   -Serial abdominal exams   -NPO  -Would place NG for decompression if he begins vomiting again  -mIVF   -Anti-emetic regimen   -Pain regimen:   -Tylenol scheduled, oxycodone 5 mg q6H PRN    #HIV  Follows with Dr. Prieto of Infectious Disease. CD4 count on 8/6 was 362. Reports compliance with his Biktarvy without missed doses.   -PTA Biktarvy daily    #DM2  Last HgbA1C was 8.2% on 9/25/20, followed by endocrinology outpatient. Home regimen typically 30 U glargine BID and Insulin aspart 20units TID with meals. Will plan to dose reduce given NPO status for his SBO.    -15 units glargine  "BID  -Medium sliding scale correction   -Hypoglycemia     Chronic issues:  #GERD: PTA famotidine      Diet:  NPO  Fluids: D5 NS @ 125ml/hr   DVT Prophylaxis: Ambulate every shift  Potts Catheter: not present  Code Status:   Full code per discussion with patient          Disposition Plan   Expected discharge: 2 - 3 days, recommended to prior living arrangement once adequate pain management/ tolerating PO medications.  Entered: Mirit Mohsen Yacoup, MD 11/11/2020, 3:33 PM       The patient's care was discussed with the Attending Physician, Dr. Angelo Shaw.    Mirit Mohsen Yacoup, MD  [address]   Contact information available via [facility] Paging/Directory  Please see sign in/sign out for up to date coverage information  ______________________________________________________________________    Chief Complaint   Abdominal pain with nausea and vomiting     History is obtained from the patient in combination with chart review     History of Present Illness   Andrew Lockwood is a 52 year old male with a history of HIV/AIDS on HAART therapy, DM2, CNS toxoplasmosis, seizures, HLD, and several episodes of recurrent SBOs who presented with x1 day of sudden onset abdominal pain similar to prior episodes of SBO.     Mr Lockwood reports that he was feeling well until this morning when he had sudden onset left sided abdominal pain. He states he had intense abdominal pain followed by nausea and emesis x9 episodes this morning. Emesis is non-bloody. He has been unable to tolerate PO since this pain started but states his last meal was very early this morning at 4am prior to this pain. He reports last BM was last night and was normal. He has had \"many many\" similar episodes to this in the past. Per chart review does appear he has had appendectomy as well as ex-lap x2 previously for SBO and lysis of adhesions. He denies any fevers, chills, shortness of breath, chest pain, or other " sites of pain other than his left lower abdomen. He also reports compliance with all his medications and was able to list them off with dosing and schedules and reports compliance with his Biktarvy specifically.     In the ED  Was hemodynamically stable and afebrile Labs unremarkable. CT showing mild SBO. Surgery saw pt recommended NG for decompression and NPO with conservative management for now.     Review of Systems    The 10 point Review of Systems is negative other than noted in the HPI or here.     Past Medical History    I have reviewed this patient's medical history and updated it with pertinent information if needed.   Past Medical History:   Diagnosis Date     AIDS (H)      Allergic rhinitis due to other allergen     DNS     Anal dysplasia      Chronic abdominal pain      CNS toxoplasmosis (H)      Diabetes type 2, controlled (H)      GERD (gastroesophageal reflux disease)      History of seizure      History of substance abuse (H)      HIV (human immunodeficiency virus infection) (H)      HLD (hyperlipidemia)      Lung nodules      Periungual wart      PTSD (post-traumatic stress disorder)      Sleep apnea     doesn't use CPAP        Past Surgical History   I have reviewed this patient's surgical history and updated it with pertinent information if needed.  Past Surgical History:   Procedure Laterality Date     ANOSCOPY N/A 9/9/2020    Procedure: Exam Under Anesthesia, ANOSCOPY, fulgeration of rectal fissures with Rectal Biopsies;  Surgeon: Thanh Lundberg MD;  Location: [facility] OR     COLONOSCOPY Left 1/22/2016    Procedure: COMBINED COLONOSCOPY, SINGLE OR MULTIPLE BIOPSY/POLYPECTOMY BY BIOPSY;  Surgeon: Clark Saini MD;  Location: [facility] GI     HC EXPLORE UNDESC TESTIS,INGUIN/SCROTAL       LAPAROSCOPIC APPENDECTOMY N/A 1/31/2018    Procedure: LAPAROSCOPIC APPENDECTOMY;  LAPAROSCOPIC APPENDECTOMY;  Surgeon: Dawn Holt MD;  Location: [facility] OR     LAPAROSCOPY DIAGNOSTIC (GENERAL) N/A  7/26/2016    Procedure: LAPAROSCOPY DIAGNOSTIC (GENERAL);  Surgeon: Susannah Arriaga MD;  Location: [facility] OR     LAPAROSCOPY DIAGNOSTIC (GENERAL) N/A 4/16/2018    Procedure: LAPAROSCOPY DIAGNOSTIC (GENERAL);  Diagnostic laparoscopy and lysis of adhesions;  Surgeon: Prince Dowling MD;  Location: [facility] OR     OPTICAL TRACKING SYSTEM CRANIOTOMY, EXCISE TUMOR, COMBINED Left 4/10/2015    Procedure: COMBINED OPTICAL TRACKING SYSTEM CRANIOTOMY, EXCISE TUMOR;  Surgeon: Mirlande Colmenares MD;  Location: [facility] OR     REPAIR GAMEKEEPER'S THUMB Right 12/2/2016    Procedure: REPAIR LIGAMENT ULNAR COLLATERAL THUMB (GAMEKEEPER'S);  Surgeon: Evin Zamorano MD;  Location:  OR     [facility] NONSPECIFIC PROCEDURE      right forearm fracture        Social History   I have reviewed this patient's social history and updated it with pertinent information if needed. Andrew Lockwood  reports that he has been smoking cigarettes. He has a 20.00 pack-year smoking history. He has quit using smokeless tobacco. He reports previous drug use. Drugs: Marijuana and Methamphetamines. He reports that he does not drink alcohol.    Family History   I have reviewed this patient's family history and updated it with pertinent information if needed.  Family History   Problem Relation Age of Onset     Diabetes Brother      Diabetes Father      Alzheimer Disease Father      Unknown/Adopted Mother      Diabetes Paternal Grandfather      Cancer No family hx of         no skin cancer     Skin Cancer No family hx of         no famiy hx of skin cancer     Glaucoma No family hx of      Macular Degeneration No family hx of        Prior to Admission Medications   Prior to Admission Medications   Prescriptions Last Dose Informant Patient Reported? Taking?   NOVOLOG FLEXPEN 100 UNIT/ML soln   No No   Sig: Inject 20 units with each meal, along with sliding scale 1 unit per 25mg/dL over 140 before meals and over 200 at bedtime. Max daily dose 90 units   Nutritional  Supplements (GLUCOSE MANAGEMENT) TABS  Self No No   Sig: Use 1-2 tabs for hypoglycemia.   SENNA-docusate sodium (SENNA S) 8.6-50 MG tablet   No No   Sig: Take 1 tablet by mouth 2 times daily Hold for loose stools.   STATIN NOT PRESCRIBED (INTENTIONAL)  Self No No   Sig: Please choose reason not prescribed, below   acetaminophen (TYLENOL) 500 MG tablet   No No   Sig: Take 1 tablet (500 mg) by mouth every 6 hours as needed for mild pain   benzocaine-menthol (CEPACOL) 15-3.6 MG lozenge   No No   Sig: Place 1 lozenge inside cheek every hour as needed for sore throat   bictegravir-emtricitabine-tenofovir (BIKTARVY) -25 MG per tablet   No No   Sig: Take 1 tablet by mouth daily   Patient taking differently: Take 1 tablet by mouth every morning    blood glucose (NO BRAND SPECIFIED) lancets standard  Self No No   Sig: Use to test blood sugar four times daily or as directed.   blood glucose (NO BRAND SPECIFIED) test strip   No No   Si strip by In Vitro route 4 times daily (before meals and nightly) Use to test blood sugars 4 times daily or as directed   blood glucose monitoring (NO BRAND SPECIFIED) meter device kit  Self No No   Sig: Use to test blood sugar 4 times daily or as directed.   cefpodoxime (VANTIN) 100 MG tablet   No No   Sig: Take 2 tablets (200 mg) by mouth 2 times daily   docusate sodium (COLACE) 50 MG capsule   No No   Sig: Take 2 capsules (100 mg) by mouth 2 times daily as needed for constipation   famotidine (PEPCID) 20 MG tablet   No No   Sig: Take 1 tablet (20 mg) by mouth 2 times daily   insulin glargine (BASAGLAR KWIKPEN) 100 UNIT/ML pen   No No   Sig: Inject 35 Units Subcutaneous 2 times daily   insulin pen needle (B-D U/F) 31G X 8 MM miscellaneous   No No   Sig: Use 1 pen needles daily or as directed.   insulin pen needle (BD SCOTT U/F) 32G X 4 MM miscellaneous   No No   Sig: USE 3 TO 4 NEEDLES DAILY   methylPREDNISolone (MEDROL DOSEPAK) 4 MG tablet therapy pack   No No   Sig: Follow Package  "Directions   naproxen (NAPROSYN) 500 MG tablet   No No   Sig: Take 1 tablet (500 mg) by mouth 2 times daily (with meals)   polyethylene glycol (MIRALAX) powder  Self No No   Sig: Take 17 g (1 capful) by mouth daily as needed for constipation   psyllium (METAMUCIL SMOOTH TEXTURE) 28 % packet   No No   Sig: Use one packet in 8oz water daily.   sennosides (SENOKOT) 8.6 MG tablet   No No   Sig: Take 1 tablet by mouth 2 times daily as needed for constipation      Facility-Administered Medications: None     Allergies   Allergies   Allergen Reactions     Metformin      Abdominal pain     Milk [Lac Bovis] Hives     Tylenol [Acetaminophen] Itching     Dulaglutide Rash     Penicillin V Other (See Comments) and Rash     Diffuse maculopapular rash + feels \"high\", per pt.        Physical Exam   Vital Signs: Temp: 99  F (37.2  C) Temp src: Oral BP: 135/87 Pulse: 86   Resp: 18 SpO2: 98 % O2 Device: None (Room air)    Weight: 190 lbs 4.8 oz    General Appearance: No acute distress laying in bed   Eyes: EOMI  HEENT: No LAD  Respiratory: CTAB on room air   Cardiovascular: RRR no murmur noted   GI: Soft, non-distended, tender to palpation of the LLQ   Skin: Warm and dry no notable rashes   Musculoskeletal: FROM in all extremities no LE edema noted  Neurologic: Alert and oriented x4 non-focal exam     Data   Data reviewed today: I reviewed all medications, new labs and imaging results over the last 24 hours. I personally reviewed the CT abdomen  image(s) showing Mild mid to distal SBO.    Recent Labs   Lab 11/11/20  0919   WBC 10.7   HGB 15.8   MCV 90         POTASSIUM 3.6   CHLORIDE 104   CO2 26   BUN 13   CR 0.82   ANIONGAP 5   LINDA 10.0   *   ALBUMIN 3.6   PROTTOTAL 8.1   BILITOTAL 1.0   ALKPHOS 132   ALT 24   AST 14   LIPASE 103     Recent Results (from the past 24 hour(s))   CT Abdomen Pelvis w Contrast    Narrative    EXAMINATION: CT ABDOMEN PELVIS W CONTRAST, 11/11/2020 10:37 AM    TECHNIQUE:  Helical CT " images from the lung bases through the  symphysis pubis were obtained with IV contrast. Contrast dose:  iopamidol (ISOVUE-370) solution 116 mL    COMPARISON: 6/25/2020 CT    HISTORY: Abd pain, acute, generalized; Left sided abdominal pain,  vomiting, rule out obstruction, diverticulitis    FINDINGS:    Abdomen and pelvis: Focal fat deposition along the falciform ligament.  No focal suspicious hepatic lesion. Cholelithiasis. No gallbladder  wall thickening or pericholecystic fluid. The biliary tree, pancreas,  spleen, adrenal glands, and renal cortices are within normal limits.  No hydronephrosis, hydroureter, or urinary tract stone. The bladder is  within normal limits. Mild prostatomegaly. Symmetric seminal vesicles.  Penile prosthesis with reservoir in the extraperitoneal pelvis along  the anterior left aspect of the bladder. No free fluid or free air.  Several loops of dilated mid to distal small bowel with air-fluid  levels in the low central abdomen with mild focal transition to  decompressed distal small bowel in the anterior right mid abdomen  (series 5 images 253-234). No pneumatosis or portal venous gas.  Surgically absent appendix. Scattered colonic diverticula. The major  abdominal vasculature is patent. Mild aortoiliac atherosclerotic  calcification without aneurysmal dilation. No abdominal or pelvic  lymphadenopathy.    Lung bases/lower chest:  5 mm nodule in the right middle lobe (series  5 image 24), unchanged. No pleural or pericardial effusion.    Bones and soft tissues: No acute or aggressive osseus abnormality.      Impression    IMPRESSION:   1. Mild partial mid to distal small bowel obstruction, likely  adhesive. No pneumatosis or portal venous gas.   2. Cholelithiasis.    JOSE VITAL DO     Internal Medicine Staff Addendum  Date of Service: 11/11/2020  I have seen and examined Mr. Lockwood, reviewed the data and discussed the plan of care with the patient and the care team on P&FC Rounds.  I  agree with the above documentation     I discussed pt's care with bedside RN, case management/social work today.  I personally reviewed labs, medications and past 24 hr notes.  Assessment/Plan/Diagnoses: plan/dx as above, which contains my edits and reflects our joint medical decision-making.     Angelo Shaw MD  Internal Medicine/Pediatrics Hospitalist & Staff Physician   of Internal Medicine and Pediatrics  [facility]  Pager: [phone]

## 2020-11-11 NOTE — TELEPHONE ENCOUNTER
[facility] Call Center    Phone Message    May a detailed message be left on voicemail: yes     Reason for Call: Medication Question or concern regarding medication   Prescription Clarification  Name of Medication: insulin pen needle (BD SCOTT U/F) 32G X 4 MM miscellaneous  Prescribing Provider:Fady   Pharmacy: [facility]. - [place], [place] - [address]   What on the order needs clarification? Per Caitie states received the fax and came through unable to read well. Caitie states had re-faxed it and wanting to have it resigned and dated and sent back.           Action Taken: Message routed to:  [facility] ([facility] Endo    Travel Screening: Not Applicable                                                                       breastfeeding exclusively

## 2020-11-12 ENCOUNTER — APPOINTMENT (OUTPATIENT)
Dept: GENERAL RADIOLOGY | Facility: CLINIC | Age: 55
End: 2020-11-12
Payer: COMMERCIAL

## 2020-11-12 LAB
ANION GAP SERPL CALCULATED.3IONS-SCNC: 5 MMOL/L (ref 3–14)
BUN SERPL-MCNC: 9 MG/DL (ref 7–30)
CALCIUM SERPL-MCNC: 8.3 MG/DL (ref 8.5–10.1)
CHLORIDE SERPL-SCNC: 110 MMOL/L (ref 94–109)
CO2 SERPL-SCNC: 26 MMOL/L (ref 20–32)
CREAT SERPL-MCNC: 0.71 MG/DL (ref 0.66–1.25)
ERYTHROCYTE [DISTWIDTH] IN BLOOD BY AUTOMATED COUNT: 12.6 % (ref 10–15)
GFR SERPL CREATININE-BSD FRML MDRD: >90 ML/MIN/{1.73_M2}
GLUCOSE BLDC GLUCOMTR-MCNC: 160 MG/DL (ref 70–99)
GLUCOSE BLDC GLUCOMTR-MCNC: 166 MG/DL (ref 70–99)
GLUCOSE BLDC GLUCOMTR-MCNC: 185 MG/DL (ref 70–99)
GLUCOSE BLDC GLUCOMTR-MCNC: 188 MG/DL (ref 70–99)
GLUCOSE BLDC GLUCOMTR-MCNC: 223 MG/DL (ref 70–99)
GLUCOSE SERPL-MCNC: 171 MG/DL (ref 70–99)
HCT VFR BLD AUTO: 39.9 % (ref 40–53)
HGB BLD-MCNC: 13.5 G/DL (ref 13.3–17.7)
MCH RBC QN AUTO: 31.5 PG (ref 26.5–33)
MCHC RBC AUTO-ENTMCNC: 33.8 G/DL (ref 31.5–36.5)
MCV RBC AUTO: 93 FL (ref 78–100)
PLATELET # BLD AUTO: 296 10E9/L (ref 150–450)
POTASSIUM SERPL-SCNC: 3.5 MMOL/L (ref 3.4–5.3)
RBC # BLD AUTO: 4.29 10E12/L (ref 4.4–5.9)
SARS-COV-2 RNA SPEC QL NAA+PROBE: NOT DETECTED
SODIUM SERPL-SCNC: 141 MMOL/L (ref 133–144)
SPECIMEN SOURCE: NORMAL
WBC # BLD AUTO: 5.4 10E9/L (ref 4–11)

## 2020-11-12 PROCEDURE — 85027 COMPLETE CBC AUTOMATED: CPT | Performed by: STUDENT IN AN ORGANIZED HEALTH CARE EDUCATION/TRAINING PROGRAM

## 2020-11-12 PROCEDURE — 258N000003 HC RX IP 258 OP 636: Performed by: STUDENT IN AN ORGANIZED HEALTH CARE EDUCATION/TRAINING PROGRAM

## 2020-11-12 PROCEDURE — 99232 SBSQ HOSP IP/OBS MODERATE 35: CPT | Mod: GC | Performed by: INTERNAL MEDICINE

## 2020-11-12 PROCEDURE — 74018 RADEX ABDOMEN 1 VIEW: CPT

## 2020-11-12 PROCEDURE — 999N000127 HC STATISTIC PERIPHERAL IV START W US GUIDANCE

## 2020-11-12 PROCEDURE — 999N001017 HC STATISTIC GLUCOSE BY METER IP

## 2020-11-12 PROCEDURE — 80048 BASIC METABOLIC PNL TOTAL CA: CPT | Performed by: STUDENT IN AN ORGANIZED HEALTH CARE EDUCATION/TRAINING PROGRAM

## 2020-11-12 PROCEDURE — 74018 RADEX ABDOMEN 1 VIEW: CPT | Mod: 26

## 2020-11-12 PROCEDURE — 36415 COLL VENOUS BLD VENIPUNCTURE: CPT | Performed by: STUDENT IN AN ORGANIZED HEALTH CARE EDUCATION/TRAINING PROGRAM

## 2020-11-12 PROCEDURE — 120N000011 HC R&B TRANSPLANT UMMC

## 2020-11-12 PROCEDURE — 250N000011 HC RX IP 250 OP 636: Performed by: STUDENT IN AN ORGANIZED HEALTH CARE EDUCATION/TRAINING PROGRAM

## 2020-11-12 PROCEDURE — 99207 PR NON-BILLABLE SERV PER CHARTING: CPT | Mod: GC | Performed by: SURGERY

## 2020-11-12 PROCEDURE — 250N000013 HC RX MED GY IP 250 OP 250 PS 637: Performed by: STUDENT IN AN ORGANIZED HEALTH CARE EDUCATION/TRAINING PROGRAM

## 2020-11-12 RX ORDER — KETOROLAC TROMETHAMINE 15 MG/ML
30 INJECTION, SOLUTION INTRAMUSCULAR; INTRAVENOUS EVERY 6 HOURS PRN
Status: DISCONTINUED | OUTPATIENT
Start: 2020-11-12 | End: 2020-11-13 | Stop reason: HOSPADM

## 2020-11-12 RX ORDER — KETOROLAC TROMETHAMINE 15 MG/ML
15 INJECTION, SOLUTION INTRAMUSCULAR; INTRAVENOUS ONCE
Status: COMPLETED | OUTPATIENT
Start: 2020-11-12 | End: 2020-11-12

## 2020-11-12 RX ADMIN — INSULIN GLARGINE 15 UNITS: 100 INJECTION, SOLUTION SUBCUTANEOUS at 21:02

## 2020-11-12 RX ADMIN — INSULIN ASPART 1 UNITS: 100 INJECTION, SOLUTION INTRAVENOUS; SUBCUTANEOUS at 20:38

## 2020-11-12 RX ADMIN — BICTEGRAVIR SODIUM, EMTRICITABINE, AND TENOFOVIR ALAFENAMIDE FUMARATE 1 TABLET: 50; 200; 25 TABLET ORAL at 08:45

## 2020-11-12 RX ADMIN — DEXTROSE AND SODIUM CHLORIDE: 5; 900 INJECTION, SOLUTION INTRAVENOUS at 00:11

## 2020-11-12 RX ADMIN — KETOROLAC TROMETHAMINE 15 MG: 15 INJECTION, SOLUTION INTRAMUSCULAR; INTRAVENOUS at 01:38

## 2020-11-12 RX ADMIN — INSULIN ASPART 1 UNITS: 100 INJECTION, SOLUTION INTRAVENOUS; SUBCUTANEOUS at 07:01

## 2020-11-12 RX ADMIN — INSULIN ASPART 1 UNITS: 100 INJECTION, SOLUTION INTRAVENOUS; SUBCUTANEOUS at 17:17

## 2020-11-12 RX ADMIN — DOCUSATE SODIUM 50 MG AND SENNOSIDES 8.6 MG 2 TABLET: 8.6; 5 TABLET, FILM COATED ORAL at 20:35

## 2020-11-12 RX ADMIN — FAMOTIDINE 20 MG: 20 TABLET ORAL at 20:35

## 2020-11-12 RX ADMIN — DEXTROSE AND SODIUM CHLORIDE: 5; 900 INJECTION, SOLUTION INTRAVENOUS at 08:51

## 2020-11-12 RX ADMIN — INSULIN ASPART 1 UNITS: 100 INJECTION, SOLUTION INTRAVENOUS; SUBCUTANEOUS at 11:18

## 2020-11-12 RX ADMIN — INSULIN ASPART 2 UNITS: 100 INJECTION, SOLUTION INTRAVENOUS; SUBCUTANEOUS at 01:52

## 2020-11-12 RX ADMIN — DOCUSATE SODIUM 50 MG AND SENNOSIDES 8.6 MG 2 TABLET: 8.6; 5 TABLET, FILM COATED ORAL at 08:46

## 2020-11-12 RX ADMIN — FAMOTIDINE 20 MG: 20 TABLET ORAL at 08:45

## 2020-11-12 RX ADMIN — KETOROLAC TROMETHAMINE 30 MG: 15 INJECTION, SOLUTION INTRAMUSCULAR; INTRAVENOUS at 11:13

## 2020-11-12 ASSESSMENT — ACTIVITIES OF DAILY LIVING (ADL)
ADLS_ACUITY_SCORE: 13

## 2020-11-12 NOTE — PROGRESS NOTES
"General Surgery Progress Note    Subjective: Did not have an NG tube placed last night as his vomiting had resolved. Not yet passing gas or having bowel movements. His pain feels about the same. Endorses a headache.     Objective:   /77   Pulse 79   Temp 97.6  F (36.4  C) (Oral)   Resp 16   Ht 1.626 m (5' 4\")   Wt 86.3 kg (190 lb 4.8 oz)   SpO2 98%   BMI 32.66 kg/m      PE:  Constitutional: Tired appearing in bed   Cardiac: Regular rate and rhythm, cap refill <2 sec  Resp: Nonlabored breathing on room air   Abd: Abdomen soft, minimally distended, minimally tender in Left and RLQ. Tolerated deep palpation of all quadrants.   Skin: Warm, dry  Neuro: Alert and oriented x4, movements coordinated and symmetric      Intake/Output Summary (Last 24 hours) at 11/12/2020 0712  Last data filed at 11/11/2020 2030  Gross per 24 hour   Intake 1500 ml   Output 1375 ml   Net 125 ml     Recent labs reviewed.    Recent imaging reviewed.    A/P: Andrew Lockwood is a 52 year old male with a past medical history including AIDS (last cd4 362 on 8/6/20) on HAART therapy, DM2, multiple SBO, CNS toxoplasmosis, anal dysplasia, kidney stone, GERD, and penicillin allergy; surgical history of penile prosthesis 6/2014, ex lap without adhesions and multiple segments of dilated bowel in 7/2016, appendectomy 1/2018 complicated by abscess, and ex lap in 2018 with multiple adhesion take down here with abdominal pain, nausea, vomiting.   - GGC today   - Serial abdominal exams  - Abdominal exam very reassuring at this time, would not operate  - NG tube if any emesis, NPO  - Pain and nausea management per primary team    Seen and discussed with chief resident who will discuss with staff.    Hans Champion MD  Surgery PGY-1    "

## 2020-11-12 NOTE — PLAN OF CARE
"Vitals: /77   Pulse 79   Temp 97.6  F (36.4  C) (Oral)   Resp 16   Ht 1.626 m (5' 4\")   Wt 86.3 kg (190 lb 4.8 oz)   SpO2 98%   BMI 32.66 kg/m      Pt admitted from ED at 2030, cared from [id number]  VS: VSS on RA. Afebrile.   Neuro: A&Ox4  Labs: Urinalysis: abnormal SARS-CoV2: pending  Respiratory: Clear, diminished in bases  Cardiac: WDL  Pain/Nausea/PRN: endorses LUQ \"pulsing\" pain not well controlled with PRN oxy1x. One time dose Toradol effective. Nausea controlled with PRN zofran 1x  Diet: NPO  LDA: R PIV  Gtt/IVF: 5% dex in NS @ 125 ml / hr   GI/: voiding, inconsistent at saving. LBM 10/10. Lower quadrant bowel sounds absent.   Skin: WDL  Mobility: Ind  Plan: treat pain, social work consult ordered; pt is houseless and is pending housing placement with [facility]    Will continue with plan of care and update team with any changes.     "

## 2020-11-12 NOTE — PHARMACY-ADMISSION MEDICATION HISTORY
Admission Medication History Completed by Pharmacy    See [facility] Admission Navigator for allergy information, preferred outpatient pharmacy, prior to admission medications and immunization status.     Medication History Sources:     Patient, dispense report, care everywhere.    Changes made to PTA medication list (reason):    Added: none    Deleted:   o Acetaminophen 500mg tablet - take 1 tablet by mouth every 6 hours as needed for mild pain  o Benzocaine-menthol 15-3.6mg lozenge - place 1 lozenge inside cheek every hours as needed for sore throat  o cefpodoxime 100mg tablet take 2 tablets by mouth 2 times daily - finished 10/6/20  o Docusate 50mg capsule - take 2 capsules by mouth 2 times daily as needed for constipation  o Methylprednisolone 4mg tablet therapy pack - follow package directions  o Naproxen 500mg tablet - take 1 tablet by mouth 2 times daily with meals  o Nutrition supplements tables - use 1-2 tabs for hypoglycemia  o Polyethylene glycol powder - take 17g by mouth daily as needed for constipation  o Psyllium 28% packet - use one packet in 8oz water daily  o Sennosides 8.6mg tablet - take 1 tablet by mouth 2 times daily as needed for constipation    Changed: none    Additional Information:    Medications verified with patient via phone    Of note:  -N/A    Prior to Admission medications    Medication Sig Last Dose Taking? Auth Provider   bictegravir-emtricitabine-tenofovir (BIKTARVY) -25 MG per tablet Take 1 tablet by mouth daily  Patient taking differently: Take 1 tablet by mouth every morning  11/11/2020 at AM Yes Ayala Prieto MD   famotidine (PEPCID) 20 MG tablet Take 1 tablet (20 mg) by mouth 2 times daily 11/11/2020 at AM Yes Venu Wolff MD   insulin glargine (BASAGLAR KWIKPEN) 100 UNIT/ML pen Inject 35 Units Subcutaneous 2 times daily 11/11/2020 at AM Yes Haley Shrestha PA-C   NOVOLOG FLEXPEN 100 UNIT/ML soln Inject 20 units with each meal, along with sliding scale  1 unit per 25mg/dL over 140 before meals and over 200 at bedtime. Max daily dose 90 units 11/11/2020 at AM Yes Haley Shrestha PA-C   SENNA-docusate sodium (SENNA S) 8.6-50 MG tablet Take 1 tablet by mouth 2 times daily Hold for loose stools. 11/10/2020 at PM Yes Caitie Lamb MD   blood glucose (NO BRAND SPECIFIED) lancets standard Use to test blood sugar four times daily or as directed.   Aguila Benitez MD   blood glucose (NO BRAND SPECIFIED) test strip 1 strip by In Vitro route 4 times daily (before meals and nightly) Use to test blood sugars 4 times daily or as directed   Ashley Connelly PA-C   blood glucose monitoring (NO BRAND SPECIFIED) meter device kit Use to test blood sugar 4 times daily or as directed.   Caitie Lamb MD   insulin pen needle (B-D U/F) 31G X 8 MM miscellaneous Use 1 pen needles daily or as directed.   Ashley Connelly PA-C   insulin pen needle (BD SCOTT U/F) 32G X 4 MM miscellaneous USE 3 TO 4 NEEDLES DAILY   Haley Shrestha PA-C   STATIN NOT PRESCRIBED (INTENTIONAL) Please choose reason not prescribed, below   Caitie Lamb MD       Date completed: 11/11/20    Medication history completed by: Jun Beach

## 2020-11-12 NOTE — PLAN OF CARE
AVSS on RA. Intermittently refusing vital signs + BG checks and other cares d/t hunger and frustration with interruptions from staff. Utilized therapeutic communication and notified team about frustrations. Pain well controlled with IV Toradol, patient declines oxycodone. Reports improvement in pain now with BM x2 this shift. Voiding adequately but not saving. NPO until this evening - now advance as tolerated after abdominal Xray (results pending). Denies nausea. -188 corrected per sliding scale. D5 NS at 125 via PIV. Up independently in room. Will continue to monitor and treat per plan of care.

## 2020-11-12 NOTE — PLAN OF CARE
Admitted/transferred from: Emergency department  2 RN full   skin assessment completed by Cole Ocasio, RN and Iris BATEMAN RN.  Skin assessment finding: skin intact, no problems   Interventions/actions: other N/A     Will continue to monitor.

## 2020-11-12 NOTE — PROGRESS NOTES
[facility]     Progress Note - [facility] 5 Service        Date of Admission:  11/11/2020    Assessment & Plan       Andrew Lockwood is a 52 year old male admitted on 11/11/2020. He has a history of HIV/AIDS on HAART therapy, DM2, CNS toxoplasmosis, seizures, HLD, and several episodes of recurrent SBOs who presented with x1 day of sudden onset abdominal pain with emesis found to have SBO.     Changes today:  -Gastrografin challenge today   -Would NG tube for decompression if vomiting   -Toradol PRN for pain and scheduled tylenol     #Abdominal pain  #Recurrent SBO   Presents with sudden onset LLQ abdominal pain x1 day with >9 episodes of emesis and inability to keep down PO. Last BM was night prior to admission. Hemodynamically stable and afebrile on presentation. Admission labs notable for normal BMP/CBC and lactate WNL. Abdominal exam benign with mild LLQ tenderness to palpation without any pain medications. CT abdomen is remarkable for a mild partial mid to distal small bowel obstruction though likely to be adhesive. He does have reported history of numerous similar episodes/SBOs since 2016 and per chart review, has had ex lap (prior lysis of adhesions) and appendectomy.  Patient was evaluated by surgery in the ED on presentation who recommended conservative management with NG decompression and pain control for now. Given he is no longer vomiting will hold off on NG unless he has repeat emesis. S/p 1 L NS bolus in the ED.   -Serial abdominal exams   -NPO  -Would place NG for decompression if he begins vomiting again  -mIVF   -Anti-emetic regimen   -Pain regimen:              -Tylenol scheduled, Toradol PRN   -GGC today      #HIV  Follows with Dr. Prieto of Infectious Disease. CD4 count on 8/6 was 362. Reports compliance with his Biktarvy without missed doses.   -PTA Biktarvy daily     #DM2  Last HgbA1C was 8.2% on 9/25/20, followed by endocrinology outpatient. Home  regimen typically 30 U glargine BID and Insulin aspart 20units TID with meals. Will plan to dose reduce given NPO status for his SBO.    -15 units glargine BID  -Medium sliding scale correction   -Hypoglycemia      Chronic issues:  #GERD: PTA famotidine      Diet: NPO for Medical/Clinical Reasons Except for: Meds    Fluids: D5NS  Lines: PIV  DVT Prophylaxis: Ambulate every shift  Potts Catheter: not present  Code Status: Full Code           Disposition Plan   Expected discharge: 2 - 3 days, recommended to prior living arrangement once adequate pain management/ tolerating PO medications.  Entered: Mirit Mohsen Yacoup, MD 11/12/2020, 10:47 AM       The patient's care was discussed with the Attending Physician, Dr. Bret Quevedo.    Mirit Mohsen Yacoup, MD  [address]   Please see sign in/sign out for up to date coverage information  ______________________________________________________________________    Interval History   Intermittent abdominal pain still   No N/V still   Feels tired     Data reviewed today: I reviewed all medications, new labs and imaging results over the last 24 hours.    Physical Exam   Vital Signs: Temp: 97.6  F (36.4  C) Temp src: Oral BP: 134/87 Pulse: 75   Resp: 16 SpO2: 97 % O2 Device: None (Room air)    Weight: 190 lbs 4.8 oz  General Appearance: Sleepy laying in bed   Respiratory: CTAB  Cardiovascular: RRR no murmur   GI: Tender to palpation on LLQ, non-distended   Skin: warm and dry     Data   Recent Labs   Lab 11/12/20  0622 11/11/20  0919   WBC 5.4 10.7   HGB 13.5 15.8   MCV 93 90    393    135   POTASSIUM 3.5 3.6   CHLORIDE 110* 104   CO2 26 26   BUN 9 13   CR 0.71 0.82   ANIONGAP 5 5   LINDA 8.3* 10.0   * 277*   ALBUMIN  --  3.6   PROTTOTAL  --  8.1   BILITOTAL  --  1.0   ALKPHOS  --  132   ALT  --  24   AST  --  14   LIPASE  --  103     No results found for this or any previous visit (from the past 24  hour(s)).

## 2020-11-13 ENCOUNTER — PATIENT OUTREACH (OUTPATIENT)
Dept: CARE COORDINATION | Facility: CLINIC | Age: 55
End: 2020-11-13

## 2020-11-13 VITALS
BODY MASS INDEX: 32.49 KG/M2 | WEIGHT: 190.3 LBS | OXYGEN SATURATION: 98 % | HEIGHT: 64 IN | HEART RATE: 80 BPM | DIASTOLIC BLOOD PRESSURE: 88 MMHG | SYSTOLIC BLOOD PRESSURE: 134 MMHG | RESPIRATION RATE: 16 BRPM | TEMPERATURE: 98 F

## 2020-11-13 LAB
ANION GAP SERPL CALCULATED.3IONS-SCNC: 4 MMOL/L (ref 3–14)
BUN SERPL-MCNC: 6 MG/DL (ref 7–30)
CALCIUM SERPL-MCNC: 8.6 MG/DL (ref 8.5–10.1)
CHLORIDE SERPL-SCNC: 106 MMOL/L (ref 94–109)
CO2 SERPL-SCNC: 26 MMOL/L (ref 20–32)
CREAT SERPL-MCNC: 0.78 MG/DL (ref 0.66–1.25)
GFR SERPL CREATININE-BSD FRML MDRD: >90 ML/MIN/{1.73_M2}
GLUCOSE BLDC GLUCOMTR-MCNC: 190 MG/DL (ref 70–99)
GLUCOSE BLDC GLUCOMTR-MCNC: 219 MG/DL (ref 70–99)
GLUCOSE BLDC GLUCOMTR-MCNC: 221 MG/DL (ref 70–99)
GLUCOSE BLDC GLUCOMTR-MCNC: 225 MG/DL (ref 70–99)
GLUCOSE SERPL-MCNC: 193 MG/DL (ref 70–99)
POTASSIUM SERPL-SCNC: 3.4 MMOL/L (ref 3.4–5.3)
SODIUM SERPL-SCNC: 137 MMOL/L (ref 133–144)

## 2020-11-13 PROCEDURE — 99239 HOSP IP/OBS DSCHRG MGMT >30: CPT | Performed by: INTERNAL MEDICINE

## 2020-11-13 PROCEDURE — 36415 COLL VENOUS BLD VENIPUNCTURE: CPT | Performed by: STUDENT IN AN ORGANIZED HEALTH CARE EDUCATION/TRAINING PROGRAM

## 2020-11-13 PROCEDURE — 250N000013 HC RX MED GY IP 250 OP 250 PS 637: Performed by: STUDENT IN AN ORGANIZED HEALTH CARE EDUCATION/TRAINING PROGRAM

## 2020-11-13 PROCEDURE — 80048 BASIC METABOLIC PNL TOTAL CA: CPT | Performed by: STUDENT IN AN ORGANIZED HEALTH CARE EDUCATION/TRAINING PROGRAM

## 2020-11-13 PROCEDURE — 258N000003 HC RX IP 258 OP 636: Performed by: STUDENT IN AN ORGANIZED HEALTH CARE EDUCATION/TRAINING PROGRAM

## 2020-11-13 PROCEDURE — 999N001017 HC STATISTIC GLUCOSE BY METER IP

## 2020-11-13 RX ADMIN — INSULIN ASPART 2 UNITS: 100 INJECTION, SOLUTION INTRAVENOUS; SUBCUTANEOUS at 04:32

## 2020-11-13 RX ADMIN — BICTEGRAVIR SODIUM, EMTRICITABINE, AND TENOFOVIR ALAFENAMIDE FUMARATE 1 TABLET: 50; 200; 25 TABLET ORAL at 08:07

## 2020-11-13 RX ADMIN — INSULIN GLARGINE 15 UNITS: 100 INJECTION, SOLUTION SUBCUTANEOUS at 08:07

## 2020-11-13 RX ADMIN — INSULIN ASPART 2 UNITS: 100 INJECTION, SOLUTION INTRAVENOUS; SUBCUTANEOUS at 11:48

## 2020-11-13 RX ADMIN — FAMOTIDINE 20 MG: 20 TABLET ORAL at 08:07

## 2020-11-13 RX ADMIN — INSULIN ASPART 2 UNITS: 100 INJECTION, SOLUTION INTRAVENOUS; SUBCUTANEOUS at 08:07

## 2020-11-13 RX ADMIN — POLYETHYLENE GLYCOL 3350 17 G: 17 POWDER, FOR SOLUTION ORAL at 08:07

## 2020-11-13 RX ADMIN — INSULIN ASPART 2 UNITS: 100 INJECTION, SOLUTION INTRAVENOUS; SUBCUTANEOUS at 00:42

## 2020-11-13 RX ADMIN — DEXTROSE AND SODIUM CHLORIDE: 5; 900 INJECTION, SOLUTION INTRAVENOUS at 00:40

## 2020-11-13 ASSESSMENT — ACTIVITIES OF DAILY LIVING (ADL)
ADLS_ACUITY_SCORE: 14

## 2020-11-13 NOTE — PLAN OF CARE
Night float paged at 400 that pt is stooling, tolerating oral intake of fluids. Pt wanted to be advanced to a regular diet and tolerated curtesy meal. Team paged back at 430 oking 5% dex being discontinued

## 2020-11-13 NOTE — DISCHARGE SUMMARY
[facility]   Hospitalist Discharge Summary      Date of Admission:  11/11/2020  Date of Discharge:  11/13/2020  Discharging Provider: Angelo Shaw MD  Discharge Team: Hospitalist Service, M5    Discharge Diagnoses   Small bowel Obstruction, resolved  Housing Insecurity  HIV    Follow-ups Needed After Discharge   None    Unresulted Labs Ordered in the Past 30 Days of this Admission     No orders found from 10/12/2020 to 11/12/2020.           Discharge Disposition   Discharged to home  Condition at discharge: Stable      Hospital Course   Andrew Lockwood is a 52 year old male with history of housing insecurity, HIV/AIDS on HAART therapy, DM2, CNS toxoplasmosis, seizures, HLD, and several episodes of recurrent SBOs who presented with x1 day of sudden onset abdominal pain with emesis found to have SBO on 11/11/20.  CT abdomen showed mild partial mid to distal SBO.  Surgery was consulted and recommended conservative management over surgery and he was admitted to the floor on 11/11.  His emesis resolved in the ED and no NG tube was needed.  On 11/12 he passed his gastrografin challenge and had return of bowel function.  On 11/13 his pain was under control and was tolerating diet.      Several hours was spent coordinating housing options with [facility] for the homeless.  We were able to obtain a hotel spot for him, so he will be discharged with housing.    #HIV  Follows with Dr. Prieto of Infectious Disease. CD4 count on 8/6 was 362. Reports compliance with his Biktarvy without missed doses.   -PTA Biktarvy daily     #DM2  Last HgbA1C was 8.2% on 9/25/20, followed by endocrinology outpatient. Home regimen typically 30 U glargine BID and Insulin aspart 20units TID with meals. Will plan to dose reduce given NPO status for his SBO.    -15 units glargine BID  -Medium sliding scale correction       Consultations This Hospital Stay   MEDICATION HISTORY IP PHARMACY  CONSULT  SOCIAL WORK IP CONSULT  VASCULAR ACCESS CARE ADULT IP CONSULT  SOCIAL WORK IP CONSULT    Code Status   Full Code    Time Spent on this Encounter   I, Angelo Shaw MD, personally saw the patient today and spent greater than 30 minutes discharging this patient.       Angelo Shaw MD  [facility] [address] [address]  Phone: [phone]  ______________________________________________________________________    Physical Exam   Vital Signs: Temp: 98  F (36.7  C) Temp src: Oral BP: 134/88 Pulse: 80   Resp: 16 SpO2: 98 % O2 Device: None (Room air)    Weight: 190 lbs 4.8 oz  Gen: NAD  HEENT: EOMI, PERRL, MMM  CV: extremities warm and well perfused, RRR, S1S2  Resp: CTA B/L, breathing comfortably on RA  Abd: soft, nontender  : deferred  Msk: no LE edema  Skin: no rashes  Neuro: nonfocal exam           Primary Care Physician   Caitie Lamb    Discharge Orders   No discharge procedures on file.    Significant Results and Procedures   Results for orders placed or performed during the hospital encounter of 11/11/20   CT Abdomen Pelvis w Contrast    Narrative    EXAMINATION: CT ABDOMEN PELVIS W CONTRAST, 11/11/2020 10:37 AM    TECHNIQUE:  Helical CT images from the lung bases through the  symphysis pubis were obtained with IV contrast. Contrast dose:  iopamidol (ISOVUE-370) solution 116 mL    COMPARISON: 6/25/2020 CT    HISTORY: Abd pain, acute, generalized; Left sided abdominal pain,  vomiting, rule out obstruction, diverticulitis    FINDINGS:    Abdomen and pelvis: Focal fat deposition along the falciform ligament.  No focal suspicious hepatic lesion. Cholelithiasis. No gallbladder  wall thickening or pericholecystic fluid. The biliary tree, pancreas,  spleen, adrenal glands, and renal cortices are within normal limits.  No hydronephrosis, hydroureter, or urinary tract stone. The bladder is  within normal limits. Mild prostatomegaly. Symmetric seminal  vesicles.  Penile prosthesis with reservoir in the extraperitoneal pelvis along  the anterior left aspect of the bladder. No free fluid or free air.  Several loops of dilated mid to distal small bowel with air-fluid  levels in the low central abdomen with mild focal transition to  decompressed distal small bowel in the anterior right mid abdomen  (series 5 images 253-234). No pneumatosis or portal venous gas.  Surgically absent appendix. Scattered colonic diverticula. The major  abdominal vasculature is patent. Mild aortoiliac atherosclerotic  calcification without aneurysmal dilation. No abdominal or pelvic  lymphadenopathy.    Lung bases/lower chest:  5 mm nodule in the right middle lobe (series  5 image 24), unchanged. No pleural or pericardial effusion.    Bones and soft tissues: No acute or aggressive osseus abnormality.      Impression    IMPRESSION:   1. Mild partial mid to distal small bowel obstruction, likely  adhesive. No pneumatosis or portal venous gas.   2. Cholelithiasis.    JOSE VITAL, DO   XR Abdomen 1 View    Narrative    EXAM: XR ABDOMEN 1 VW  11/12/2020 5:30 PM     HISTORY:  gastrografin challenge       TECHNIQUE: Single frontal radiograph of the abdomen    COMPARISON:  11/20/2020    FINDINGS:   Positive contrast agent seen throughout the colon to the rectum.    Nonobstructive bowel gas pattern. No pneumatosis, portal venous gas.      Impression    IMPRESSION: Oral positive contrast agent has progressed as far  distally as the rectum.     I have personally reviewed the examination and initial interpretation  and I agree with the findings.    ZENY LEONG MD     *Note: Due to a large number of results and/or encounters for the requested time period, some results have not been displayed. A complete set of results can be found in Results Review.       Discharge Medications   Current Discharge Medication List      CONTINUE these medications which have NOT CHANGED    Details    bictegravir-emtricitabine-tenofovir (BIKTARVY) -25 MG per tablet Take 1 tablet by mouth daily  Qty: 30 tablet, Refills: 11    Associated Diagnoses: Human immunodeficiency virus (HIV) disease (H)      famotidine (PEPCID) 20 MG tablet Take 1 tablet (20 mg) by mouth 2 times daily  Qty: 30 tablet, Refills: 11    Associated Diagnoses: Gastroesophageal reflux disease without esophagitis      insulin glargine (BASAGLAR KWIKPEN) 100 UNIT/ML pen Inject 35 Units Subcutaneous 2 times daily  Qty: 15 mL, Refills: 15    Comments: If Basaglar is not covered by insurance, may substitute Lantus at same dose and frequency.    Associated Diagnoses: Type 2 diabetes mellitus without complication, with long-term current use of insulin (H)      NOVOLOG FLEXPEN 100 UNIT/ML soln Inject 20 units with each meal, along with sliding scale 1 unit per 25mg/dL over 140 before meals and over 200 at bedtime. Max daily dose 90 units  Qty: 75 mL, Refills: 3    Associated Diagnoses: Type 2 diabetes mellitus with hyperglycemia, with long-term current use of insulin (H)      SENNA-docusate sodium (SENNA S) 8.6-50 MG tablet Take 1 tablet by mouth 2 times daily Hold for loose stools.  Qty: 60 tablet, Refills: 5    Associated Diagnoses: Constipation, unspecified constipation type      blood glucose (NO BRAND SPECIFIED) lancets standard Use to test blood sugar four times daily or as directed.  Qty: 100 each, Refills: 0    Associated Diagnoses: Type 2 diabetes mellitus without complication, without long-term current use of insulin (H)      blood glucose (NO BRAND SPECIFIED) test strip 1 strip by In Vitro route 4 times daily (before meals and nightly) Use to test blood sugars 4 times daily or as directed  Qty: 100 strip, Refills: 11    Associated Diagnoses: Type 2 diabetes mellitus with complication, without long-term current use of insulin (H)      blood glucose monitoring (NO BRAND SPECIFIED) meter device kit Use to test blood sugar 4 times daily or  "as directed.  Qty: 1 kit, Refills: 0    Comments: Please provide meter that is covered by insurance.  Associated Diagnoses: Type 2 diabetes mellitus with complication, without long-term current use of insulin (H)      !! insulin pen needle (B-D U/F) 31G X 8 MM miscellaneous Use 1 pen needles daily or as directed.  Qty: 100 each, Refills: 3    Associated Diagnoses: Type 2 diabetes mellitus without complication, without long-term current use of insulin (H)      !! insulin pen needle (BD SCOTT U/F) 32G X 4 MM miscellaneous USE 3 TO 4 NEEDLES DAILY  Qty: 400 each, Refills: 3    Associated Diagnoses: Type 2 diabetes mellitus with hyperglycemia, without long-term current use of insulin (H)      STATIN NOT PRESCRIBED (INTENTIONAL) Please choose reason not prescribed, below    Associated Diagnoses: Type 2 diabetes mellitus with complication, without long-term current use of insulin (H)       !! - Potential duplicate medications found. Please discuss with provider.        Allergies   Allergies   Allergen Reactions     Metformin      Abdominal pain     Milk [Lac Bovis] Hives     Tylenol [Acetaminophen] Itching     Dulaglutide Rash     Penicillin V Other (See Comments) and Rash     Diffuse maculopapular rash + feels \"high\", per pt.      "

## 2020-11-13 NOTE — PROGRESS NOTES
Brief Surgery Note    Passed GG challenge. Having bowel function.   Advance diet as tolerated.   Discussed with patient that he may require surgery if he continues to have recurrent SBOs. Patient is not interested in surgery at this time.   Surgery will sign off. Please call with questions or concerns.    Viri Jones MD  General Surgery, PGY-5  [phone]

## 2020-11-13 NOTE — PROGRESS NOTES
"SPIRITUAL HEALTH SERVICES  SPIRITUAL ASSESSMENT Progress Note  [facility] ([place] U U U7A     REFERRAL SOURCE: Self initiated tele visit    Pt is Gnosticism of a [place] descendant.He mentioned that he is doing very well. The pt share that he is a homeless, and could use all the help he can get to find a home.He told me \" sister pray for me to Allah to make my situation easy for me.I prayed for him to Allah that may Allah make his situation easy for him, and healing.     PLAN: Follow up as needed. [facility] is always available for support    Yamile Michelle  Chaplain RESIDENT    "

## 2020-11-13 NOTE — DISCHARGE SUMMARY
Physician Discharge Summary     Patient ID:  Andrew Lockwood  [id number]  52 year old  11/27/1967    Admit date: 11/11/2020    Discharge date and time: {DISCHARGE DATE:119492}     Admitting Physician: Hernando Stahl MD     Discharge Physician: ***    Admission Diagnoses: Small bowel obstruction (H) [K56.609]    Discharge Diagnoses: Small bowel Obstruction, resolved    Admission Condition: fair and stable    Discharged Condition: good    Indication for Admission: Conservative management for partial SBO    Hospital Course: Andrew Lockwood is a 52 year old male with history of HIV/AIDS on HAART therapy, DM2, CNS toxoplasmosis, seizures, HLD, and several episodes of recurrent SBOs who presented with x1 day of sudden onset abdominal pain with emesis found to have SBO on 11/11/20.  CT abdomen showed mild partial mid to distal SBO.  Surgery was consulted and recommended conservative management over surgery and he was admitted to the floor on 11/11.  His emesis resolved in the ED and no NG tube was needed.  On 11/12 he passed his gastrografin challenge and had return of bowel function.  On 11/13 his pain was under control and required on narcotics.  He will be discharged home.    Consults: general surgery    Significant Diagnostic Studies: radiology: CT scan: 1. Mild partial mid to distal small bowel obstruction, likely  adhesive. No pneumatosis or portal venous gas.   2. Cholelithiasis. and GG study: Oral positive contrast agent has progressed as far  distally as the rectum.     Treatments: IV hydration, analgesia: acetaminophen and Oxycodone and insulin: Lantus    Discharge Exam:  {exam; complete normal and system select:504323}    Disposition: home    Patient Instructions:   Current Discharge Medication List      CONTINUE these medications which have NOT CHANGED    Details   bictegravir-emtricitabine-tenofovir (BIKTARVY) -25 MG per tablet Take 1 tablet by mouth daily  Qty: 30 tablet, Refills: 11     Associated Diagnoses: Human immunodeficiency virus (HIV) disease (H)      famotidine (PEPCID) 20 MG tablet Take 1 tablet (20 mg) by mouth 2 times daily  Qty: 30 tablet, Refills: 11    Associated Diagnoses: Gastroesophageal reflux disease without esophagitis      insulin glargine (BASAGLAR KWIKPEN) 100 UNIT/ML pen Inject 35 Units Subcutaneous 2 times daily  Qty: 15 mL, Refills: 15    Comments: If Basaglar is not covered by insurance, may substitute Lantus at same dose and frequency.    Associated Diagnoses: Type 2 diabetes mellitus without complication, with long-term current use of insulin (H)      NOVOLOG FLEXPEN 100 UNIT/ML soln Inject 20 units with each meal, along with sliding scale 1 unit per 25mg/dL over 140 before meals and over 200 at bedtime. Max daily dose 90 units  Qty: 75 mL, Refills: 3    Associated Diagnoses: Type 2 diabetes mellitus with hyperglycemia, with long-term current use of insulin (H)      SENNA-docusate sodium (SENNA S) 8.6-50 MG tablet Take 1 tablet by mouth 2 times daily Hold for loose stools.  Qty: 60 tablet, Refills: 5    Associated Diagnoses: Constipation, unspecified constipation type      blood glucose (NO BRAND SPECIFIED) lancets standard Use to test blood sugar four times daily or as directed.  Qty: 100 each, Refills: 0    Associated Diagnoses: Type 2 diabetes mellitus without complication, without long-term current use of insulin (H)      blood glucose (NO BRAND SPECIFIED) test strip 1 strip by In Vitro route 4 times daily (before meals and nightly) Use to test blood sugars 4 times daily or as directed  Qty: 100 strip, Refills: 11    Associated Diagnoses: Type 2 diabetes mellitus with complication, without long-term current use of insulin (H)      blood glucose monitoring (NO BRAND SPECIFIED) meter device kit Use to test blood sugar 4 times daily or as directed.  Qty: 1 kit, Refills: 0    Comments: Please provide meter that is covered by insurance.  Associated Diagnoses: Type 2  diabetes mellitus with complication, without long-term current use of insulin (H)      !! insulin pen needle (B-D U/F) 31G X 8 MM miscellaneous Use 1 pen needles daily or as directed.  Qty: 100 each, Refills: 3    Associated Diagnoses: Type 2 diabetes mellitus without complication, without long-term current use of insulin (H)      !! insulin pen needle (BD SCOTT U/F) 32G X 4 MM miscellaneous USE 3 TO 4 NEEDLES DAILY  Qty: 400 each, Refills: 3    Associated Diagnoses: Type 2 diabetes mellitus with hyperglycemia, without long-term current use of insulin (H)      STATIN NOT PRESCRIBED (INTENTIONAL) Please choose reason not prescribed, below    Associated Diagnoses: Type 2 diabetes mellitus with complication, without long-term current use of insulin (H)       !! - Potential duplicate medications found. Please discuss with provider.        Activity: activity as tolerated  Diet: {diet:195254}  Wound Care: none needed    Follow-up with *** in {NUMBERS; 0-10:510036} {units:11}.    Signed:  Raul Saunders  11/13/2020  10:00 AM

## 2020-11-13 NOTE — PLAN OF CARE
"/88 (BP Location: Right arm)   Pulse 80   Temp 98  F (36.7  C) (Oral)   Resp 16   Ht 1.626 m (5' 4\")   Wt 86.3 kg (190 lb 4.8 oz)   SpO2 98%   BMI 32.66 kg/m       [id number].     DISCHARGE:  Patient with orders to discharge to hotel.     Discharge instructions, medications & follow ups reviewed with patient. Copy of discharge summary given to patient. PIV removed.     Patient in stable condition. AVSS. Patient had no further questions regarding discharge instructions and medications. Patient transferred out by cab ride & left with belongings.      "

## 2020-11-13 NOTE — PLAN OF CARE
"Vitals: /88 (BP Location: Right arm)   Pulse 80   Temp 98  F (36.7  C) (Oral)   Resp 16   Ht 1.626 m (5' 4\")   Wt 86.3 kg (190 lb 4.8 oz)   SpO2 98%   BMI 32.66 kg/m      Shift: 7052-2351  VS: VSS on RA. Afebrile.   Neuro: A&Ox4  Labs: Bg 185, 221, and 225 this shift  Respiratory: WDL  Cardiac: WDL  Pain/Nausea/PRN: denies pain, denies nausea   Diet: advanced to reg diet   LDA: L PIV SL   Gtt/IVF: n/a  GI/: 3 loose brown watery bms this shift. Voiding well not saving.   Skin: WDL  Mobility: ind  Plan: Needs plan for housing. Social work consult requested.     Will continue with plan of care and update team with any changes.     "

## 2020-11-13 NOTE — CONSULTS
Care Management Discharge Note    Discharge Date: 11/14/20    Discharge Disposition: [facility],   [address] [place] [address]    Discharge Services: [employer] ([phone])    Discharge DME: None    Discharge Transportation: [employer] ([phone])    Private pay costs discussed: Not applicable    PAS Confirmation Code: N/A  Patient/family educated on Medicare website which has current facility and service quality ratings: N/A    Education Provided on the Discharge Plan: Yes  Persons Notified of Discharge Plans: Patient, treatment team, nursing staff, [facility]  Patient/Family in Agreement with the Plan: Yes    Handoff Referral Completed: Yes    Additional Information:  DAYNA got a phone call from [employer] ([phone]) with [facility], which is a senior high risk hotel. Patient can stay in the hotel and needs to arrive after 1;00 PM and check in before 5:00-6:00 PM. The address is [address] [place] [address]. DAYNA to set up a ride for patient. DAYNA then went to go speak with patient. He was packing up his room but signed a few forms his PCP office had sent to DAYNA. Patient had no other questions or concerns for DAYNA. DAYNA set up a 1 way taxi ride with [employer] ([phone]) as patient does not have ride benefits through his [place]. The booking number for the ride is [id number]. DAYNA emailed patient's PCP office the signed forms.     RADHA Andujar, CRISTAL  7A   Ph: [phone]  Pager: [phone]

## 2020-11-16 NOTE — PROGRESS NOTES
[facility]: Post-Discharge Note  SITUATION                                                      Admission:    Admission Date: 11/11/20   Reason for Admission: small bowel obstruction  Discharge:   Discharge Date: 11/13/20  Discharge Diagnosis: small bowel obstruction  Discharge Service: general medicine  Discharge Plan:  pcp    BACKGROUND                                                      Andrew Lockwood is a 52 year old male with history of HIV/AIDS on HAART therapy, DM2, CNS toxoplasmosis, seizures, HLD, and several episodes of recurrent SBOs who presented with x1 day of sudden onset abdominal pain with emesis found to have SBO on 11/11/20.  CT abdomen showed mild partial mid to distal SBO.  Surgery was consulted and recommended conservative management over surgery and he was admitted to the floor on 11/11.  His emesis resolved in the ED and no NG tube was needed.  On 11/12 he passed his gastrografin challenge and had return of bowel function.  On 11/13 his pain was under control and required on narcotics.  He will be discharged home.       ASSESSMENT      Discharge Assessment  Patient reports symptoms are: Improved(heading to pharmacy)  Does the patient have all of their medications?: Yes  Does patient know what their new medications are for?: Yes(going to see pcp next week sometime)  Does patient have a follow-up appointment scheduled?: No  Does patient have any other questions or concerns?: No    Post-op  Did the patient have surgery or a procedure: No  Fever: No  Chills: No  Eating & Drinking: eating and drinking without complaints/concerns  PO Intake: regular diet  Bowel Function: normal  Urinary Status: voiding without complaint/concerns      PLAN                                                      Outpatient Plan:      Future Appointments   Date Time Provider Department Center   11/24/2020  2:00 PM Evin Sal MD [facility]           Shabnam Glaser

## 2020-11-17 DIAGNOSIS — E11.69 TYPE 2 DIABETES MELLITUS WITH OTHER SPECIFIED COMPLICATION, WITH LONG-TERM CURRENT USE OF INSULIN (H): Primary | ICD-10-CM

## 2020-11-17 DIAGNOSIS — E11.9 TYPE 2 DIABETES MELLITUS WITHOUT COMPLICATION, WITHOUT LONG-TERM CURRENT USE OF INSULIN (H): ICD-10-CM

## 2020-11-17 DIAGNOSIS — Z79.4 TYPE 2 DIABETES MELLITUS WITH OTHER SPECIFIED COMPLICATION, WITH LONG-TERM CURRENT USE OF INSULIN (H): Primary | ICD-10-CM

## 2020-11-18 ENCOUNTER — TELEPHONE (OUTPATIENT)
Dept: ENDOCRINOLOGY | Facility: CLINIC | Age: 55
End: 2020-11-18

## 2020-11-18 DIAGNOSIS — Z79.4 TYPE 2 DIABETES MELLITUS WITH HYPERGLYCEMIA, WITH LONG-TERM CURRENT USE OF INSULIN (H): ICD-10-CM

## 2020-11-18 DIAGNOSIS — E11.65 TYPE 2 DIABETES MELLITUS WITH HYPERGLYCEMIA, WITH LONG-TERM CURRENT USE OF INSULIN (H): Primary | ICD-10-CM

## 2020-11-18 DIAGNOSIS — Z79.4 TYPE 2 DIABETES MELLITUS WITH HYPERGLYCEMIA, WITH LONG-TERM CURRENT USE OF INSULIN (H): Primary | ICD-10-CM

## 2020-11-18 DIAGNOSIS — E11.65 TYPE 2 DIABETES MELLITUS WITH HYPERGLYCEMIA, WITH LONG-TERM CURRENT USE OF INSULIN (H): ICD-10-CM

## 2020-11-18 DIAGNOSIS — E11.69 TYPE 2 DIABETES MELLITUS WITH OTHER SPECIFIED COMPLICATION, WITH LONG-TERM CURRENT USE OF INSULIN (H): Primary | ICD-10-CM

## 2020-11-18 DIAGNOSIS — Z79.4 TYPE 2 DIABETES MELLITUS WITH OTHER SPECIFIED COMPLICATION, WITH LONG-TERM CURRENT USE OF INSULIN (H): Primary | ICD-10-CM

## 2020-11-18 DIAGNOSIS — Z79.4 TYPE 2 DIABETES MELLITUS WITHOUT COMPLICATION, WITH LONG-TERM CURRENT USE OF INSULIN (H): ICD-10-CM

## 2020-11-18 DIAGNOSIS — E11.9 TYPE 2 DIABETES MELLITUS WITHOUT COMPLICATION, WITH LONG-TERM CURRENT USE OF INSULIN (H): ICD-10-CM

## 2020-11-18 RX ORDER — INSULIN GLARGINE 100 [IU]/ML
35 INJECTION, SOLUTION SUBCUTANEOUS 2 TIMES DAILY
Qty: 15 ML | Refills: 15 | Status: ON HOLD | OUTPATIENT
Start: 2020-11-18 | End: 2021-07-17

## 2020-11-18 RX ORDER — INSULIN ASPART 100 [IU]/ML
INJECTION, SOLUTION INTRAVENOUS; SUBCUTANEOUS
Qty: 75 ML | Refills: 3 | Status: SHIPPED | OUTPATIENT
Start: 2020-11-18 | End: 2020-12-19

## 2020-11-18 RX ORDER — LANCETS
EACH MISCELLANEOUS
Qty: 100 EACH | Refills: 6 | Status: ON HOLD | OUTPATIENT
Start: 2020-11-18 | End: 2021-07-17

## 2020-11-18 RX ORDER — LANCETS
EACH MISCELLANEOUS
Qty: 360 EACH | Refills: 3 | Status: ON HOLD | OUTPATIENT
Start: 2020-11-18 | End: 2021-07-17

## 2020-11-18 RX ORDER — GLUCOSAMINE HCL/CHONDROITIN SU 500-400 MG
CAPSULE ORAL
Qty: 100 EACH | Refills: 3 | Status: ON HOLD | OUTPATIENT
Start: 2020-11-18 | End: 2021-07-17

## 2020-11-18 RX ORDER — LANCETS 33 GAUGE
EACH MISCELLANEOUS
Qty: 100 EACH | Refills: 11 | Status: SHIPPED | OUTPATIENT
Start: 2020-11-18 | End: 2021-09-21

## 2020-11-18 NOTE — TELEPHONE ENCOUNTER
New meter and supplies sent to  [address] [place] [address] pharmacy for whateveris covered testing 4 times daily Aleah Hampton RN on 11/18/2020 at 10:02 AM

## 2020-11-18 NOTE — TELEPHONE ENCOUNTER
----- Message from Lenka Mabry sent at 11/18/2020  9:51 AM CST -----  Regarding: FW: Formulary Change   Good Morning!    Andrew is in need of a whole new system because of insurance. He would like to pick something up today. I was wondering if anyone could help me out with this.     Thank you for your help!    Lenka Mabry, [facility]  [phone]     ----- Message -----  From: Lenka Mabry  Sent: 11/17/2020  10:31 AM CST  To: Haley Shrestha PA-C  Subject: Formulary Change                                 Hello again,    It seems that his insurance has changed what kind of strips and lancets they cover. Andrew is going to need a whole new machine/strips/lancets. Could you send us generic rx's for these items? I haven't been able to figure out what exactly is covered.     Thanks again!!    Lenka Mabry [facility]  [phone]

## 2020-12-19 ENCOUNTER — HOSPITAL ENCOUNTER (EMERGENCY)
Facility: CLINIC | Age: 55
Discharge: HOME | End: 2020-12-20
Attending: EMERGENCY MEDICINE | Admitting: EMERGENCY MEDICINE
Payer: COMMERCIAL

## 2020-12-19 DIAGNOSIS — Z79.4 TYPE 2 DIABETES MELLITUS WITH HYPERGLYCEMIA, WITH LONG-TERM CURRENT USE OF INSULIN (H): ICD-10-CM

## 2020-12-19 DIAGNOSIS — E11.69 TYPE 2 DIABETES MELLITUS WITH OTHER SPECIFIED COMPLICATION, WITH LONG-TERM CURRENT USE OF INSULIN (H): ICD-10-CM

## 2020-12-19 DIAGNOSIS — Z79.4 TYPE 2 DIABETES MELLITUS WITH OTHER SPECIFIED COMPLICATION, WITH LONG-TERM CURRENT USE OF INSULIN (H): ICD-10-CM

## 2020-12-19 DIAGNOSIS — K08.89 PAIN, DENTAL: ICD-10-CM

## 2020-12-19 DIAGNOSIS — B20 HUMAN IMMUNODEFICIENCY VIRUS (HIV) DISEASE (H): ICD-10-CM

## 2020-12-19 DIAGNOSIS — E11.65 TYPE 2 DIABETES MELLITUS WITH HYPERGLYCEMIA, WITH LONG-TERM CURRENT USE OF INSULIN (H): ICD-10-CM

## 2020-12-19 LAB
ALBUMIN SERPL-MCNC: 4.1 G/DL (ref 3.4–5)
ALP SERPL-CCNC: 160 U/L (ref 40–150)
ALT SERPL W P-5'-P-CCNC: 31 U/L (ref 0–70)
ANION GAP SERPL CALCULATED.3IONS-SCNC: 2 MMOL/L (ref 3–14)
AST SERPL W P-5'-P-CCNC: 22 U/L (ref 0–45)
BASOPHILS # BLD AUTO: 0.1 10E9/L (ref 0–0.2)
BASOPHILS NFR BLD AUTO: 0.6 %
BILIRUB SERPL-MCNC: 0.4 MG/DL (ref 0.2–1.3)
BUN SERPL-MCNC: 19 MG/DL (ref 7–30)
CALCIUM SERPL-MCNC: 9.1 MG/DL (ref 8.5–10.1)
CHLORIDE SERPL-SCNC: 105 MMOL/L (ref 94–109)
CO2 SERPL-SCNC: 29 MMOL/L (ref 20–32)
CREAT SERPL-MCNC: 0.73 MG/DL (ref 0.66–1.25)
CRP SERPL-MCNC: 7.2 MG/L (ref 0–8)
DIFFERENTIAL METHOD BLD: NORMAL
EOSINOPHIL # BLD AUTO: 0.3 10E9/L (ref 0–0.7)
EOSINOPHIL NFR BLD AUTO: 3.4 %
ERYTHROCYTE [DISTWIDTH] IN BLOOD BY AUTOMATED COUNT: 13 % (ref 10–15)
ERYTHROCYTE [SEDIMENTATION RATE] IN BLOOD BY WESTERGREN METHOD: 10 MM/H (ref 0–20)
GFR SERPL CREATININE-BSD FRML MDRD: >90 ML/MIN/{1.73_M2}
GLUCOSE SERPL-MCNC: 203 MG/DL (ref 70–99)
HCT VFR BLD AUTO: 47.6 % (ref 40–53)
HGB BLD-MCNC: 16.2 G/DL (ref 13.3–17.7)
IMM GRANULOCYTES # BLD: 0.1 10E9/L (ref 0–0.4)
IMM GRANULOCYTES NFR BLD: 1.1 %
INR PPP: 1 (ref 0.86–1.14)
INTERPRETATION ECG - MUSE: NORMAL
LACTATE BLD-SCNC: 1.2 MMOL/L (ref 0.7–2)
LYMPHOCYTES # BLD AUTO: 2.5 10E9/L (ref 0.8–5.3)
LYMPHOCYTES NFR BLD AUTO: 27.8 %
MCH RBC QN AUTO: 31.7 PG (ref 26.5–33)
MCHC RBC AUTO-ENTMCNC: 34 G/DL (ref 31.5–36.5)
MCV RBC AUTO: 93 FL (ref 78–100)
MONOCYTES # BLD AUTO: 0.7 10E9/L (ref 0–1.3)
MONOCYTES NFR BLD AUTO: 8 %
NEUTROPHILS # BLD AUTO: 5.2 10E9/L (ref 1.6–8.3)
NEUTROPHILS NFR BLD AUTO: 59.1 %
NRBC # BLD AUTO: 0 10*3/UL
NRBC BLD AUTO-RTO: 0 /100
PLATELET # BLD AUTO: 356 10E9/L (ref 150–450)
POTASSIUM SERPL-SCNC: 3.6 MMOL/L (ref 3.4–5.3)
PROT SERPL-MCNC: 8.2 G/DL (ref 6.8–8.8)
RBC # BLD AUTO: 5.11 10E12/L (ref 4.4–5.9)
SODIUM SERPL-SCNC: 136 MMOL/L (ref 133–144)
TROPONIN I SERPL-MCNC: <0.015 UG/L (ref 0–0.04)
WBC # BLD AUTO: 8.8 10E9/L (ref 4–11)

## 2020-12-19 PROCEDURE — 85025 COMPLETE CBC W/AUTO DIFF WBC: CPT | Performed by: EMERGENCY MEDICINE

## 2020-12-19 PROCEDURE — 99284 EMERGENCY DEPT VISIT MOD MDM: CPT | Performed by: EMERGENCY MEDICINE

## 2020-12-19 PROCEDURE — 80053 COMPREHEN METABOLIC PANEL: CPT | Performed by: EMERGENCY MEDICINE

## 2020-12-19 PROCEDURE — 85652 RBC SED RATE AUTOMATED: CPT | Performed by: EMERGENCY MEDICINE

## 2020-12-19 PROCEDURE — 84484 ASSAY OF TROPONIN QUANT: CPT | Performed by: EMERGENCY MEDICINE

## 2020-12-19 PROCEDURE — 250N000013 HC RX MED GY IP 250 OP 250 PS 637: Performed by: EMERGENCY MEDICINE

## 2020-12-19 PROCEDURE — 93010 ELECTROCARDIOGRAM REPORT: CPT | Performed by: EMERGENCY MEDICINE

## 2020-12-19 PROCEDURE — 99283 EMERGENCY DEPT VISIT LOW MDM: CPT | Performed by: EMERGENCY MEDICINE

## 2020-12-19 PROCEDURE — 86140 C-REACTIVE PROTEIN: CPT | Performed by: EMERGENCY MEDICINE

## 2020-12-19 PROCEDURE — 85610 PROTHROMBIN TIME: CPT | Performed by: EMERGENCY MEDICINE

## 2020-12-19 PROCEDURE — 93005 ELECTROCARDIOGRAM TRACING: CPT | Performed by: EMERGENCY MEDICINE

## 2020-12-19 PROCEDURE — 83605 ASSAY OF LACTIC ACID: CPT | Performed by: EMERGENCY MEDICINE

## 2020-12-19 RX ORDER — INSULIN ASPART 100 [IU]/ML
INJECTION, SOLUTION INTRAVENOUS; SUBCUTANEOUS
Qty: 75 ML | Refills: 0 | Status: ON HOLD | OUTPATIENT
Start: 2020-12-19 | End: 2021-07-17

## 2020-12-19 RX ORDER — CLINDAMYCIN HCL 300 MG
300 CAPSULE ORAL 3 TIMES DAILY
Qty: 21 CAPSULE | Refills: 0 | Status: SHIPPED | OUTPATIENT
Start: 2020-12-19 | End: 2020-12-26

## 2020-12-19 RX ORDER — CLINDAMYCIN HCL 300 MG
300 CAPSULE ORAL ONCE
Status: COMPLETED | OUTPATIENT
Start: 2020-12-19 | End: 2020-12-19

## 2020-12-19 RX ADMIN — CLINDAMYCIN HYDROCHLORIDE 300 MG: 300 CAPSULE ORAL at 22:48

## 2020-12-19 ASSESSMENT — MIFFLIN-ST. JEOR: SCORE: 1562.47

## 2020-12-19 NOTE — ED AVS SNAPSHOT
[facility] Emergency Department  [address] [facility] [address]  Phone: [phone]                                    Andrew Lockwood   MRN: [id number]    Department: [facility] Emergency Department   Date of Visit: 12/19/2020           After Visit Summary Signature Page    I have received my discharge instructions, and my questions have been answered. I have discussed any challenges I see with this plan with the nurse or doctor.    ..........................................................................................................................................  Patient/Patient Representative Signature      ..........................................................................................................................................  Patient Representative Print Name and Relationship to Patient    ..................................................               ................................................  Date                                   Time    ..........................................................................................................................................  Reviewed by Signature/Title    ...................................................              ..............................................  Date                                               Time          22EPIC Rev 08/18

## 2020-12-20 VITALS
DIASTOLIC BLOOD PRESSURE: 88 MMHG | BODY MASS INDEX: 30.73 KG/M2 | SYSTOLIC BLOOD PRESSURE: 144 MMHG | TEMPERATURE: 98.1 F | HEART RATE: 104 BPM | HEIGHT: 64 IN | WEIGHT: 180 LBS | OXYGEN SATURATION: 99 % | RESPIRATION RATE: 20 BRPM

## 2020-12-20 PROCEDURE — 250N000013 HC RX MED GY IP 250 OP 250 PS 637: Performed by: EMERGENCY MEDICINE

## 2020-12-20 RX ADMIN — OXYCODONE HYDROCHLORIDE 2.5 MG: 5 TABLET ORAL at 02:08

## 2020-12-20 NOTE — ED PROVIDER NOTES
[place] EMERGENCY DEPARTMENT ([facility])  12/19/20   History     Chief Complaint   Patient presents with     Dental Pain     HPI  Andrew Lockwood is a 53 year old male who has a PMH of HIV/AIDS on HAART, CNS toxoplasmosis, DM2, HLD, hx of polysubstance abuse, recurrent SBOs of unclear etiology (most recently admitted from 11/11-11/13/20 for this) s/p lap appendectomy (2018), homelessness, who presents to the Emergency Department for right sided lower dental pain for the past week (has had prior similar presentations in the past).  He also reports that he lost his HIV meds and insulin, would like a refill of those medications.     Patient reports about a week's worth of right lower dental pain. Has been seen in the ED on 9/24/20 with similar presentation and sx's in the same spot. He was discharged w/ Amoxicillin, though see penicillin listed on his allergy list. Patient reports has been doing OK since then, but pain recurred this last week. Has not yet seen a dentist as previously recommended.     No recent traumas or injuries to the tooth. No fevers, chills, or body aches. No HA, vision or hearing changes. No neck pain or stiffness. No numbness, tingling or weakness. No pain elsewhere in the mouth, no mouth sores. No bleeding or drainage. Has not noticed any swelling. No trouble with mouth opening. No change in voice. No trouble w/ swallowing and no pain. Able to tolerate secretions. No pain in neck or with neck ROM. Normal breathing, no feeling of airway compromise. No other symptoms or complaints at this time. Please see ROS for further details.          I have reviewed the Medications, Allergies, Past Medical and Surgical History, and Social History in the [facility] system.  Past Medical History:   Diagnosis Date     AIDS (H)      Allergic rhinitis due to other allergen     DNS     Anal dysplasia      Chronic abdominal pain      CNS toxoplasmosis (H)      Diabetes type 2, controlled (H)      GERD  (gastroesophageal reflux disease)      History of seizure      History of substance abuse (H)      HIV (human immunodeficiency virus infection) (H)      HLD (hyperlipidemia)      Lung nodules      Periungual wart      PTSD (post-traumatic stress disorder)      Sleep apnea     doesn't use CPAP       Past Surgical History:   Procedure Laterality Date     ANOSCOPY N/A 9/9/2020    Procedure: Exam Under Anesthesia, ANOSCOPY, fulgeration of rectal fissures with Rectal Biopsies;  Surgeon: Thanh Lundberg MD;  Location: [facility] OR     COLONOSCOPY Left 1/22/2016    Procedure: COMBINED COLONOSCOPY, SINGLE OR MULTIPLE BIOPSY/POLYPECTOMY BY BIOPSY;  Surgeon: Clark Saini MD;  Location: [facility] GI     HC EXPLORE UNDESC TESTIS,INGUIN/SCROTAL       LAPAROSCOPIC APPENDECTOMY N/A 1/31/2018    Procedure: LAPAROSCOPIC APPENDECTOMY;  LAPAROSCOPIC APPENDECTOMY;  Surgeon: Dawn Holt MD;  Location: [facility] OR     LAPAROSCOPY DIAGNOSTIC (GENERAL) N/A 7/26/2016    Procedure: LAPAROSCOPY DIAGNOSTIC (GENERAL);  Surgeon: Susannah Arriaga MD;  Location: [facility] OR     LAPAROSCOPY DIAGNOSTIC (GENERAL) N/A 4/16/2018    Procedure: LAPAROSCOPY DIAGNOSTIC (GENERAL);  Diagnostic laparoscopy and lysis of adhesions;  Surgeon: Prince Dowling MD;  Location: [facility] OR     OPTICAL TRACKING SYSTEM CRANIOTOMY, EXCISE TUMOR, COMBINED Left 4/10/2015    Procedure: COMBINED OPTICAL TRACKING SYSTEM CRANIOTOMY, EXCISE TUMOR;  Surgeon: Mirlande Colmenares MD;  Location: [facility] OR     REPAIR GAMEKEEPER'S THUMB Right 12/2/2016    Procedure: REPAIR LIGAMENT ULNAR COLLATERAL THUMB (GAMEKEEPER'S);  Surgeon: Evin Zamorano MD;  Location: [facility] OR     ZZC NONSPECIFIC PROCEDURE      right forearm fracture     Past medical history, past surgical history, medications, and allergies were reviewed with the patient.     Family History   Problem Relation Age of Onset     Diabetes Brother      Diabetes Father      Alzheimer Disease Father      Unknown/Adopted Mother  "     Diabetes Paternal Grandfather      Cancer No family hx of         no skin cancer     Skin Cancer No family hx of         no famiy hx of skin cancer     Glaucoma No family hx of      Macular Degeneration No family hx of        Social History     Tobacco Use     Smoking status: Current Some Day Smoker     Packs/day: 0.50     Years: 40.00     Pack years: 20.00     Types: Cigarettes     Smokeless tobacco: Former User   Substance Use Topics     Alcohol use: No     Alcohol/week: 0.0 standard drinks     Drinks per session: Patient refused     Comment: Last etoh in 2007      Social history was reviewed with the patient.     Review of Systems   Constitutional: Negative for diaphoresis, fatigue and fever.   HENT: Positive for dental problem. Negative for drooling, ear pain, mouth sores, rhinorrhea, sore throat, trouble swallowing and voice change.    Respiratory: Negative for cough and shortness of breath.    Cardiovascular: Negative for chest pain.   Gastrointestinal: Negative for nausea and vomiting.   Musculoskeletal: Negative for neck pain and neck stiffness.   Skin: Negative for color change, rash and wound.   Allergic/Immunologic: Immunocompromised state: HIV pt.   Neurological: Negative for syncope, weakness, light-headedness and headaches.   All other systems reviewed and are negative.        Physical Exam   BP: (!) 129/96  Pulse: 106  Temp: 98.1  F (36.7  C)  Resp: 16  Height: 162.6 cm (5' 4\")  Weight: 81.6 kg (180 lb)  SpO2: 95 %      CONSTITUTIONAL: Well-developed and well-nourished. Awake and alert. Non-toxic appearance. No acute distress.   HENT:   - Head: Normocephalic and atraumatic.   - Ears: Hearing and external ear grossly normal.   - Nose: Nose normal. No rhinorrhea. No epistaxis.   - Mouth/Throat: MMM. No trismus or drooling.  No tongue elevation. Normal voice. Uvula is midline and nonedematous. Floor of mouth is soft. No orpharyngeal erythema, but no exudates. No palatal petechiae or other intraoral " lesions. No asymmetry. No obvious findings for PTA, RPA, epiglottitis or Michael's Angina. Does have some poor dentition and the right lower molar he reports is painful looks like it has caries/prior broken tooth, but no fullness, fluctuance or evidence for abscess.   EYES: Conjunctivae and lids are normal. No scleral icterus.   NECK: Normal range of motion and phonation normal. No pain with ROM. Neck supple.  No tracheal deviation, no stridor. No edema or erythema noted. No fullness or fluctuance.   CARDIOVASCULAR: Normal rate, regular rhythm and no appreciable abnormal heart sounds.  PULMONARY/CHEST: Normal work of breathing. No accessory muscle usage or stridor. No respiratory distress.    MUSCULOSKELETAL: Extremities warm and seemingly well perfused. No edema or calf tenderness.  NEUROLOGIC: Awake, alert. Not disoriented. He displays no atrophy and no tremor. Normal tone. No seizure activity. GCS 15  SKIN: Skin is warm and dry. No rash noted. No diaphoresis. No pallor.   PSYCHIATRIC: Normal mood and affect. Speech and behavior normal. Thought processes linear. Cognition and memory are normal.      ED Course     ED Course as of Dec 20 0421   Sat Dec 19, 2020   3442 Discussed w/ Poison control, reviewed labs. They believe OK to go, no intervention or further obs needed, but should be advised to watch closely for sx's, avoid use and in the future use only as directed.       2301 Patient would like dental block, gathering equipment then will have consent discussion and perform procedure               EKG Interpretation:      Interpreted by Ceci Trammell MD  Time reviewed: 22:05pm  Symptoms at time of EKG: Dental pain   Rhythm: normal sinus   Rate: Normal  Axis: Normal  Ectopy: none  Conduction: normal  ST Segments/ T Waves: No obvious acute ischemic changes, looks similar to previous  Comparison to prior: (vs. 24 Sep 2020), rate is improved now/no longer sinus tachycardia, otherwise looks grossly similar to  previous  Clinical Impression: no acute changes        Assessments & Plan (with Medical Decision Making)   IMPRESSION: 53 year old male w/ PMH notable for HIV/AIDS on HAART, CNS toxoplasmosis, DM2, HLD, hx of polysubstance abuse, recurrent SBOs of unclear etiology (most recently admitted from 11/11-11/13/20 for this) s/p lap appendectomy (2018), homelessness, who presents to the Emergency Department for right sided lower dental pain for the past week (has had prior similar presentations in the past).  He also reports that he lost his HIV meds and insulin, would like a refill of those medications, as described further above in the HPI/ROS.    Clinically, patient appears nontoxic, NAD, vitals, WNL.  Otherwise on examination, Has poor dentition with what appears a chronically broken tooth, right lower. No swelling, fullness or fluctuance. Floor of mouth is soft. Protecting airway, able to tolerate secretions, and has a normal voice. No findings for PTA, RPA, epiglottitis and olivia's angina. No findings for airway compromise, and has a normal respiratory status.     Ddx includes, but not limited to, dental pain, possible dental infection, but no findings for abscess. No findings for other complicated HEENT infection, deep space or intraoral/neck infection.     PLAN: Labs, pain management  - Risks/benefits of pursuing imaging review and accepted.     RESULTS:  - Labs: No acute findings, normal inflammatory markers    INTERVENTIONS:   - PO Clindamycin (has penicillin allergy on EMR list)  - PO oxycodone (2.5mg PO x1)    RE-EVALUATION:  - Pt otherwise continues to do well here in the ED, no acute issues or apparent concerning changes in vitals or clinical appearance.    DISCUSSIONS:  - w/ Poison Control: Reviewed case, presentation, sx's, labs, etc. They report no other intervention needed at this time. Can discharge, no further testing or treatment. Will just need to be advised on safe use.   - w/ Patient: I have  reviewed the available findings, plan, need for close follow up, medication safety instructions, strict return/safety instructions with the patient. He knows to go to PCP for additional refills of medications and for general recheck, and will contact a Dentist. He expressed understanding and agreement with this plan. All questions answered to the best of our ability at this time.     DISPOSITION/PLANNING:  - IMPRESSION: Dental pain  - DISPOSITION: Discharge  - FOLLOW-UP: PCP and Dental team  - RECOMMENDATIONS: Conservative symptom management, strict return instructions  - Rx: Clindamycin, refilled his HIV and insulin medications but he will have to go to PCP for ongoing refills      ______________________________________________________________________       12/19/2020   [facility] EMERGENCY DEPARTMENT     Ceci Trammell MD  12/21/20 0149

## 2020-12-20 NOTE — ED TRIAGE NOTES
Pt presents with c/o right dental pain x 1 week.  Denies fever or drainage.  Lost HIV meds and insulin.  Would like refills.

## 2020-12-20 NOTE — ED NOTES
Patient refusing to leave upon discharge. Prescriptions given, IV pulled, patient educated that he cannot spend the night in hospital d/t discharge status. Security called for escort off or premises.

## 2020-12-20 NOTE — DISCHARGE INSTRUCTIONS
TODAY'S VISIT:  You were seen today for dental pain and for refills of your HIV and insulin medications.  - We have prescribed you an antibiotic to manage/prevent dental infection (in review of your Allergy list on the medical record, it does not appear that you'll have an allergy to this, but watch closely for any new or worsening symptoms.)  - We provided a short prescription for your usual medications, so you need to talk to your usual providers for additional prescriptions.   - You should discuss all imaging/radiology tests and laboratory tests that were performed during this visit with your usual providers to ensure you continue to improve and do not need any further evaluation, testing or management.   - Please call your Primary Care team to discuss and arrange a follow-up appointment.     FOLLOW-UP:  Please make an appointment to follow up with:  - Your Primary Care Provider and Dental - Immediate Care Clinic (phone: [phone])   - Many of these clinics offer a sliding fee option for patients that qualify, and see patients on a walk-in or same day basis. Please call each clinic directly. As services, hours, fees and policies vary greatly.    [place]:  [facility]     [phone]  [facility] [phone]   Dental Clinic  [phone]  [facility]      [phone]   [facility]    [phone]  [facility]  [phone]  [facility] [phone]  [facility]     [phone]  [facility]   [phone]  [facility] (cash only)   [phone]  [facility]    [phone] (adults)          [phone] (children  [place]:  [facility]     [phone]; [phone]  [facility]     [phone]  [facility]     [phone]  [facility] (free, limited)    [phone]  Multiple Locations:  [facility]       [phone]  - If you do not have a primary care provider, you can be seen in follow-up and establish care with one of our providers by calling of the the clinics below:  --- Primary Care Center (phone: [phone])  --- Primary Care / [facility] (phone: [phone])   - Have your provider review the results from today's visit with you again to make sure no further follow-up or additional testing is needed based on those results.     PRESCRIPTIONS / MEDICATIONS:  - ONLY USE MEDICATIONS AS INSTRUCTED (Do NOT take more than is on the instructions/prescriptions).   - Avoid ibuprofen/NSAID use for the next few days, and then only use as instructed.   - Please take the prescribed antibiotic until completed or told otherwise by a medical provider.   - Notify a medical provider of any abnormal reactions to this medication.   - Continue previous prescriptions as previously prescribed.   --- We did not prescribe refills of your home medications and you will need to speak with your usual providers to have ongoing prescriptions.    OTHER INSTRUCTIONS:  - Do your best to stay hydrated.    RETURN TO THE EMERGENCY DEPARTMENT  Return to the Emergency Department immediately for any new or worsening symptoms or any concerns.     Remember that you can always come back to the Emergency Department if you are not able to see your regular doctor in the amount of time listed above, if you get any new symptoms, or if there is anything that worries you.    *DENTAL PAIN    A crack or cavity in the tooth, which exposes the sensitive inner area of the tooth can cause tooth pain. An infection in the gum or the root of the tooth can cause pain and swelling. The pain is often made worse by drinking hot or cold fluids, or biting on hard foods. Pain may spread from the tooth to the ear or jaw on the same  "side.  HOME CARE:  Avoid hot and cold foods and liquids since your tooth may be sensitive to temperature changes.  If your tooth is chipped or cracked, or if there is a large open cavity, apply OIL OF CLOVES (available over-the-counter in drug stores) directly to the tooth to reduce pain. Some pharmacies carry an over-the-counter \"toothache kit.\" This contains a paste, which can be applied over the exposed tooth to decrease sensitivity. This is only a temporary solution. See a dentist as soon as possible to fix the tooth.  A cold pack on your jaw over the sore area may help reduce pain.  If you have signs of an infection, an antibiotic will be given. Take it as directed.  FOLLOW-UP as directed with a dentist. Your pain may go away with the treatment given. However, only a dentist can fully evaluate and treat the cause and prevent the pain from coming back again.  TOOTHACHE IS A SIGN OF DISEASE IN YOUR TOOTH AND SHOULD BE EXAMINED AND TREATED BY A DENTIST.  GET PROMPT MEDICAL ATTENTION if any of the following occur:  Your face becomes swollen or red  Pain worsens or spreads to the neck  Fever over 101  F (38.3  C)  Unusual drowsiness; headache or stiff neck; weakness or fainting  Pus drains from the tooth  Difficulty swallowing or breathing    3659-6383 [employer], [address] [place] [address], [place] [address]. All rights reserved. This information is not intended as a substitute for professional medical care. Always follow your healthcare professional's instructions.      "

## 2020-12-21 ASSESSMENT — ENCOUNTER SYMPTOMS
WEAKNESS: 0
SHORTNESS OF BREATH: 0
HEADACHES: 0
NECK PAIN: 0
TROUBLE SWALLOWING: 0
COLOR CHANGE: 0
NECK STIFFNESS: 0
LIGHT-HEADEDNESS: 0
WOUND: 0
VOMITING: 0
COUGH: 0
RHINORRHEA: 0
FATIGUE: 0
SORE THROAT: 0
NAUSEA: 0
FEVER: 0
VOICE CHANGE: 0
DIAPHORESIS: 0

## 2021-01-10 ENCOUNTER — HOSPITAL ENCOUNTER (EMERGENCY)
Facility: CLINIC | Age: 56
Discharge: HOME | End: 2021-01-10
Attending: EMERGENCY MEDICINE | Admitting: EMERGENCY MEDICINE
Payer: COMMERCIAL

## 2021-01-10 ENCOUNTER — APPOINTMENT (OUTPATIENT)
Dept: CT IMAGING | Facility: CLINIC | Age: 56
End: 2021-01-10
Attending: EMERGENCY MEDICINE
Payer: COMMERCIAL

## 2021-01-10 VITALS
RESPIRATION RATE: 16 BRPM | TEMPERATURE: 98.9 F | BODY MASS INDEX: 30.9 KG/M2 | OXYGEN SATURATION: 95 % | HEIGHT: 64 IN | HEART RATE: 111 BPM | DIASTOLIC BLOOD PRESSURE: 101 MMHG | SYSTOLIC BLOOD PRESSURE: 149 MMHG

## 2021-01-10 DIAGNOSIS — R10.32 ABDOMINAL PAIN, LEFT LOWER QUADRANT: ICD-10-CM

## 2021-01-10 LAB
ALBUMIN SERPL-MCNC: 3.6 G/DL (ref 3.4–5)
ALP SERPL-CCNC: 180 U/L (ref 40–150)
ALT SERPL W P-5'-P-CCNC: 28 U/L (ref 0–70)
ANION GAP SERPL CALCULATED.3IONS-SCNC: 6 MMOL/L (ref 3–14)
AST SERPL W P-5'-P-CCNC: 12 U/L (ref 0–45)
BASOPHILS # BLD AUTO: 0.1 10E9/L (ref 0–0.2)
BASOPHILS NFR BLD AUTO: 0.5 %
BILIRUB SERPL-MCNC: 0.6 MG/DL (ref 0.2–1.3)
BUN SERPL-MCNC: 15 MG/DL (ref 7–30)
CALCIUM SERPL-MCNC: 9 MG/DL (ref 8.5–10.1)
CHLORIDE SERPL-SCNC: 98 MMOL/L (ref 94–109)
CO2 SERPL-SCNC: 28 MMOL/L (ref 20–32)
CREAT BLD-MCNC: 0.6 MG/DL (ref 0.66–1.25)
CREAT SERPL-MCNC: 0.75 MG/DL (ref 0.66–1.25)
DIFFERENTIAL METHOD BLD: NORMAL
EOSINOPHIL # BLD AUTO: 0.2 10E9/L (ref 0–0.7)
EOSINOPHIL NFR BLD AUTO: 2.6 %
ERYTHROCYTE [DISTWIDTH] IN BLOOD BY AUTOMATED COUNT: 12.2 % (ref 10–15)
GFR SERPL CREATININE-BSD FRML MDRD: >90 ML/MIN/{1.73_M2}
GFR SERPL CREATININE-BSD FRML MDRD: >90 ML/MIN/{1.73_M2}
GLUCOSE SERPL-MCNC: 366 MG/DL (ref 70–99)
HCT VFR BLD AUTO: 42.7 % (ref 40–53)
HGB BLD-MCNC: 14.7 G/DL (ref 13.3–17.7)
IMM GRANULOCYTES # BLD: 0.1 10E9/L (ref 0–0.4)
IMM GRANULOCYTES NFR BLD: 1.2 %
LIPASE SERPL-CCNC: 193 U/L (ref 73–393)
LYMPHOCYTES # BLD AUTO: 2.1 10E9/L (ref 0.8–5.3)
LYMPHOCYTES NFR BLD AUTO: 23 %
MCH RBC QN AUTO: 30.9 PG (ref 26.5–33)
MCHC RBC AUTO-ENTMCNC: 34.4 G/DL (ref 31.5–36.5)
MCV RBC AUTO: 90 FL (ref 78–100)
MONOCYTES # BLD AUTO: 0.6 10E9/L (ref 0–1.3)
MONOCYTES NFR BLD AUTO: 6 %
NEUTROPHILS # BLD AUTO: 6.1 10E9/L (ref 1.6–8.3)
NEUTROPHILS NFR BLD AUTO: 66.7 %
NRBC # BLD AUTO: 0 10*3/UL
NRBC BLD AUTO-RTO: 0 /100
PLATELET # BLD AUTO: 361 10E9/L (ref 150–450)
POTASSIUM SERPL-SCNC: 3.9 MMOL/L (ref 3.4–5.3)
PROT SERPL-MCNC: 7.8 G/DL (ref 6.8–8.8)
RBC # BLD AUTO: 4.75 10E12/L (ref 4.4–5.9)
SODIUM SERPL-SCNC: 132 MMOL/L (ref 133–144)
WBC # BLD AUTO: 9.2 10E9/L (ref 4–11)

## 2021-01-10 PROCEDURE — 99285 EMERGENCY DEPT VISIT HI MDM: CPT | Mod: 25 | Performed by: EMERGENCY MEDICINE

## 2021-01-10 PROCEDURE — 74177 CT ABD & PELVIS W/CONTRAST: CPT

## 2021-01-10 PROCEDURE — 99284 EMERGENCY DEPT VISIT MOD MDM: CPT | Performed by: EMERGENCY MEDICINE

## 2021-01-10 PROCEDURE — 250N000011 HC RX IP 250 OP 636: Performed by: EMERGENCY MEDICINE

## 2021-01-10 PROCEDURE — 85025 COMPLETE CBC W/AUTO DIFF WBC: CPT | Performed by: EMERGENCY MEDICINE

## 2021-01-10 PROCEDURE — 250N000011 HC RX IP 250 OP 636: Performed by: RADIOLOGY

## 2021-01-10 PROCEDURE — 96375 TX/PRO/DX INJ NEW DRUG ADDON: CPT | Performed by: EMERGENCY MEDICINE

## 2021-01-10 PROCEDURE — 74177 CT ABD & PELVIS W/CONTRAST: CPT | Mod: 26 | Performed by: RADIOLOGY

## 2021-01-10 PROCEDURE — 82565 ASSAY OF CREATININE: CPT

## 2021-01-10 PROCEDURE — 80053 COMPREHEN METABOLIC PANEL: CPT | Performed by: EMERGENCY MEDICINE

## 2021-01-10 PROCEDURE — 96374 THER/PROPH/DIAG INJ IV PUSH: CPT | Mod: 59 | Performed by: EMERGENCY MEDICINE

## 2021-01-10 PROCEDURE — 83690 ASSAY OF LIPASE: CPT | Performed by: EMERGENCY MEDICINE

## 2021-01-10 RX ORDER — ONDANSETRON 2 MG/ML
4 INJECTION INTRAMUSCULAR; INTRAVENOUS EVERY 30 MIN PRN
Status: DISCONTINUED | OUTPATIENT
Start: 2021-01-10 | End: 2021-01-10 | Stop reason: HOSPADM

## 2021-01-10 RX ORDER — IOPAMIDOL 755 MG/ML
111 INJECTION, SOLUTION INTRAVASCULAR ONCE
Status: COMPLETED | OUTPATIENT
Start: 2021-01-10 | End: 2021-01-10

## 2021-01-10 RX ORDER — HYDROMORPHONE HYDROCHLORIDE 1 MG/ML
0.5 INJECTION, SOLUTION INTRAMUSCULAR; INTRAVENOUS; SUBCUTANEOUS
Status: COMPLETED | OUTPATIENT
Start: 2021-01-10 | End: 2021-01-10

## 2021-01-10 RX ADMIN — ONDANSETRON 4 MG: 2 INJECTION INTRAMUSCULAR; INTRAVENOUS at 14:21

## 2021-01-10 RX ADMIN — HYDROMORPHONE HYDROCHLORIDE 0.5 MG: 1 INJECTION, SOLUTION INTRAMUSCULAR; INTRAVENOUS; SUBCUTANEOUS at 14:23

## 2021-01-10 RX ADMIN — IOPAMIDOL 111 ML: 755 INJECTION, SOLUTION INTRAVENOUS at 14:38

## 2021-01-10 ASSESSMENT — ENCOUNTER SYMPTOMS
NAUSEA: 1
NECK STIFFNESS: 0
BRUISES/BLEEDS EASILY: 0
CHILLS: 0
ADENOPATHY: 0
FEVER: 0
COLOR CHANGE: 0
HEADACHES: 0
ABDOMINAL PAIN: 1
SHORTNESS OF BREATH: 0
DIFFICULTY URINATING: 0
POLYDIPSIA: 0
VOMITING: 0
EYE REDNESS: 0
CONFUSION: 0
ARTHRALGIAS: 0

## 2021-01-10 NOTE — ED TRIAGE NOTES
Reports abdominal pain and nausea that started this morning, thinks he has an obstruction. Denies vomiting.

## 2021-01-10 NOTE — DISCHARGE INSTRUCTIONS
Please make an appointment to follow up with Your Primary Care Provider in 2 days unless symptoms completely resolve.        *ABDOMINAL PAIN,UNCERTAIN CAUSE [Male]  Based on your visit today, the exact cause of your abdominal (stomach) pain is not certain. Your condition does not seem serious now; however, the signs of a serious problem may take more time to appear. Therefore, it is important for you to watch for any new symptoms or worsening of your condition.  HOME CARE:  1) Rest until your next exam. No strenuous activities.  2) Eat a diet low in fiber (called a low-residue diet). Foods allowed include refined breads, white rice, fruit and vegetable juices without pulp, tender meats. These foods will pass more easily through the intestine.  3) Avoid fried or fatty foods, dairy, alcohol and spicy foods until your symptoms go away.  FOLLOW UP with your doctor or this facility as instructed, or if your pain does not begin to improve in the next 24 hours.   GET PROMPT MEDICAL ATTENTION if any of the following occur:  -- Pain gets worse or moves to the right lower abdomen  -- New or worsening vomiting or diarrhea  -- Swelling of the abdomen  -- Unable to pass stool for more than three days  -- New fever over 101.0  F (38.3  C), or rising fever  -- Blood in vomit or bowel movements (dark red or black color)  -- Jaundice (yellow color of eyes and skin)  -- Weakness, dizziness or fainting  -- Chest, arm, back, neck or jaw pain    3437-4837 [employer], [address] [place] [address], [place] [address]. All rights reserved. This information is not intended as a substitute for professional medical care. Always follow your healthcare professional's instructions.

## 2021-01-10 NOTE — ED AVS SNAPSHOT
[facility] Emergency Department  [address] [facility] [address]  Phone: [phone]                                    Andrew Lockwood   MRN: [id number]    Department: [facility] Emergency Department   Date of Visit: 1/10/2021           After Visit Summary Signature Page    I have received my discharge instructions, and my questions have been answered. I have discussed any challenges I see with this plan with the nurse or doctor.    ..........................................................................................................................................  Patient/Patient Representative Signature      ..........................................................................................................................................  Patient Representative Print Name and Relationship to Patient    ..................................................               ................................................  Date                                   Time    ..........................................................................................................................................  Reviewed by Signature/Title    ...................................................              ..............................................  Date                                               Time          22EPIC Rev 08/18

## 2021-01-10 NOTE — ED PROVIDER NOTES
[place] EMERGENCY DEPARTMENT ([facility])  1/10/21    History     Chief Complaint   Patient presents with     Abdominal Pain     Nausea     The history is provided by the patient and medical records.     Andrew Lockwood is a 53 year old male with a history of HIV/AIDS (on HAART therapy, CD4 count 362 on 8/6/2020), type 2 diabetes mellitus, CNS toxoplasmosis, recurrent SBOs, CNS toxoplasmosis, s/p appendectomy (2018), and HLD who presents to the Emergency Department with abdominal pain and nausea. Patient reports LLQ abdominal pain since last night. He states it is a squeezing pain, mild to moderate, nonradiating, constant and feels like his previous bowel obstructions.  Nothing makes the pain better or worse.  Patient reports his last BM was last night around 12 am. He endorses nausea but no vomiting. No difficulty urinating or hematuria.    Per chart review, patient was recently admitted from 11/11-11/13/2020 with small bowel obstruction. He reports 1 day of abdominal pain and vomiting. CT showed mild partial mid to distal SBO. Patient was admitted and on 11/12 passed his gastrografin challenge and had return of bowel function. Patient was discharged with housing.    Past Medical History  Past Medical History:   Diagnosis Date     AIDS (H)      Allergic rhinitis due to other allergen     DNS     Anal dysplasia      Chronic abdominal pain      CNS toxoplasmosis (H)      Diabetes type 2, controlled (H)      GERD (gastroesophageal reflux disease)      History of seizure      History of substance abuse (H)      HIV (human immunodeficiency virus infection) (H)      HLD (hyperlipidemia)      Lung nodules      Periungual wart      PTSD (post-traumatic stress disorder)      Sleep apnea     doesn't use CPAP     Past Surgical History:   Procedure Laterality Date     ANOSCOPY N/A 9/9/2020    Procedure: Exam Under Anesthesia, ANOSCOPY, fulgeration of rectal fissures with Rectal Biopsies;  Surgeon: Thanh Lundberg,  MD;  Location: [facility] OR     COLONOSCOPY Left 1/22/2016    Procedure: COMBINED COLONOSCOPY, SINGLE OR MULTIPLE BIOPSY/POLYPECTOMY BY BIOPSY;  Surgeon: Clark Saini MD;  Location: [facility] GI     HC EXPLORE UNDESC TESTIS,INGUIN/SCROTAL       LAPAROSCOPIC APPENDECTOMY N/A 1/31/2018    Procedure: LAPAROSCOPIC APPENDECTOMY;  LAPAROSCOPIC APPENDECTOMY;  Surgeon: Dawn Holt MD;  Location: [facility] OR     LAPAROSCOPY DIAGNOSTIC (GENERAL) N/A 7/26/2016    Procedure: LAPAROSCOPY DIAGNOSTIC (GENERAL);  Surgeon: Susannah Arriaga MD;  Location: [facility] OR     LAPAROSCOPY DIAGNOSTIC (GENERAL) N/A 4/16/2018    Procedure: LAPAROSCOPY DIAGNOSTIC (GENERAL);  Diagnostic laparoscopy and lysis of adhesions;  Surgeon: Prince Dowling MD;  Location: [facility] OR     OPTICAL TRACKING SYSTEM CRANIOTOMY, EXCISE TUMOR, COMBINED Left 4/10/2015    Procedure: COMBINED OPTICAL TRACKING SYSTEM CRANIOTOMY, EXCISE TUMOR;  Surgeon: Mirlande Colmenares MD;  Location: [facility] OR     REPAIR GAMEKEEPER'S THUMB Right 12/2/2016    Procedure: REPAIR LIGAMENT ULNAR COLLATERAL THUMB (GAMEKEEPER'S);  Surgeon: Evin Zamorano MD;  Location: [facility] OR     ZZC NONSPECIFIC PROCEDURE      right forearm fracture          bictegravir-emtricitabine-tenofovir (BIKTARVY) -25 MG per tablet       famotidine (PEPCID) 20 MG tablet       insulin glargine (BASAGLAR KWIKPEN) 100 UNIT/ML pen       NOVOLOG FLEXPEN 100 UNIT/ML soln       SENNA-docusate sodium (SENNA S) 8.6-50 MG tablet       alcohol swab prep pads       blood glucose (NO BRAND SPECIFIED) lancets standard       blood glucose (NO BRAND SPECIFIED) test strip       blood glucose (NO BRAND SPECIFIED) test strip       blood glucose (NO BRAND SPECIFIED) test strip       blood glucose calibration (NO BRAND SPECIFIED) solution       blood glucose calibration (NO BRAND SPECIFIED) solution       blood glucose monitoring (NO BRAND SPECIFIED) meter device kit       blood glucose monitoring (NO BRAND SPECIFIED)  "meter device kit       blood glucose monitoring (NO BRAND SPECIFIED) meter device kit       insulin glargine (LANTUS SOLOSTAR) 100 UNIT/ML pen       insulin pen needle (B-D U/F) 31G X 8 MM miscellaneous       insulin pen needle (BD SCOTT U/F) 32G X 4 MM miscellaneous       Lancets (ONETOUCH DELICA PLUS [id number]) MISC       STATIN NOT PRESCRIBED (INTENTIONAL)       thin (NO BRAND SPECIFIED) lancets       thin (NO BRAND SPECIFIED) lancets      Allergies   Allergen Reactions     Metformin      Abdominal pain     Milk [Lac Bovis] Hives     Tylenol [Acetaminophen] Itching     Dulaglutide Rash     Penicillin V Other (See Comments) and Rash     Diffuse maculopapular rash + feels \"high\", per pt.      Family History  Family History   Problem Relation Age of Onset     Diabetes Brother      Diabetes Father      Alzheimer Disease Father      Unknown/Adopted Mother      Diabetes Paternal Grandfather      Cancer No family hx of         no skin cancer     Skin Cancer No family hx of         no famiy hx of skin cancer     Glaucoma No family hx of      Macular Degeneration No family hx of      Social History   Social History     Tobacco Use     Smoking status: Current Some Day Smoker     Packs/day: 0.50     Years: 40.00     Pack years: 20.00     Types: Cigarettes     Smokeless tobacco: Former User   Substance Use Topics     Alcohol use: No     Alcohol/week: 0.0 standard drinks     Drinks per session: Patient refused     Comment: Last etoh in 2007     Drug use: Not Currently     Types: Marijuana, Methamphetamines     Comment: Sober 9 months       Past medical history, past surgical history, medications, allergies, family history, and social history were reviewed with the patient. No additional pertinent items.       Review of Systems   Constitutional: Negative for chills and fever.   HENT: Negative for congestion.    Eyes: Negative for redness.   Respiratory: Negative for shortness of breath.    Cardiovascular: Negative for chest " "pain.   Gastrointestinal: Positive for abdominal pain and nausea. Negative for vomiting.   Endocrine: Negative for polydipsia and polyuria.   Genitourinary: Negative for difficulty urinating.   Musculoskeletal: Negative for arthralgias and neck stiffness.   Skin: Negative for color change.   Neurological: Negative for headaches.   Hematological: Negative for adenopathy. Does not bruise/bleed easily.   Psychiatric/Behavioral: Negative for confusion.   All other systems reviewed and are negative.    A complete review of systems was performed with pertinent positives and negatives noted in the HPI, and all other systems negative.    Physical Exam   BP: 136/77  Pulse: 75  Temp: 98.7  F (37.1  C)  Resp: 18  Height: 162.6 cm (5' 4\")  SpO2: 96 %  Physical Exam  Vitals signs and nursing note reviewed.   Constitutional:       General: He is not in acute distress.     Appearance: Normal appearance. He is not diaphoretic.   HENT:      Head: Normocephalic and atraumatic.   Eyes:      General: No scleral icterus.     Extraocular Movements: Extraocular movements intact.      Conjunctiva/sclera: Conjunctivae normal.   Neck:      Musculoskeletal: Normal range of motion and neck supple.   Cardiovascular:      Rate and Rhythm: Normal rate.      Pulses: Normal pulses.      Heart sounds: Normal heart sounds.   Pulmonary:      Effort: Pulmonary effort is normal. No respiratory distress.      Breath sounds: Normal breath sounds. No wheezing, rhonchi or rales.   Abdominal:      General: Abdomen is flat. There is no distension.      Palpations: Abdomen is soft.      Tenderness: There is abdominal tenderness in the left lower quadrant. There is no right CVA tenderness, left CVA tenderness or guarding. Negative signs include Mendes's sign and McBurney's sign.   Musculoskeletal: Normal range of motion.         General: No tenderness.   Skin:     General: Skin is warm.      Findings: No rash.   Neurological:      General: No focal deficit " present.      Mental Status: He is alert and oriented to person, place, and time.      Cranial Nerves: No cranial nerve deficit.      Coordination: Coordination normal.   Psychiatric:         Mood and Affect: Mood normal.         Behavior: Behavior normal.         Thought Content: Thought content normal.         Judgment: Judgment normal.         ED Course     1:24 PM  The patient was seen and examined by Braulio Wayne MD in Room ED22.   Procedures                         Results for orders placed or performed during the hospital encounter of 01/10/21   CT Abdomen Pelvis w Contrast     Status: None    Narrative    EXAMINATION: CT ABDOMEN PELVIS W CONTRAST, 1/10/2021 2:47 PM    TECHNIQUE:  Helical CT images from the lung bases through the  symphysis pubis were obtained with contrast.  Coronal reformatted  images were generated at a workstation for further assessment.    CONTRAST:  111 cc Isovue 370 IV.    COMPARISON: CT 6/25/2020    HISTORY: Nausea, vomiting, abdominal pain; history of small bowel  obstruction, concern for small bowel obstruction    FINDINGS:    Abdomen and pelvis:   Liver: No suspicious liver lesions. Vague low density just to the  right of the falciform ligament may indicate benign focal fatty  infiltration. Portal veins appear patent.  Gallbladder: No gallstones. No evidence of acute cholecystitis.  Spleen: Normal size.  Pancreas: No suspicious pancreatic lesions. The pancreatic duct is not  dilated.  Adrenal glands: No adrenal nodules.  Kidneys: No kidney masses. No hydronephrosis or obstructing renal  stones.  Bladder / Pelvic organs: Unremarkable. Prosthetic penile device in  place. Prostate enlargement.  Bowel: No bowel wall thickening. Moderate colonic stool burden.  Lymph nodes: No retroperitoneal, mesenteric, or pelvic  lymphadenopathy.  Fluid: No free fluid within the abdomen.  Vessels: No infrarenal aortic aneurysm. Mild atherosclerosis at the  SMA origin.    Lung bases: Right lower lobe  minimal subsegmental atelectasis. No  consolidation or pleural effusion.    Bones and soft tissues: No suspicious osseous lesions.      Impression    IMPRESSION:     No radiographic findings to definitely correlate with patient's  symptoms. Probable incidental focal fatty infiltration in the liver.  Penile prosthesis. Prostate enlargement. Mild SMA origin  atherosclerosis.       I have personally reviewed the examination and initial interpretation  and I agree with the findings.    MATIAS LIZ MD   CBC with platelets differential     Status: None   Result Value Ref Range    WBC 9.2 4.0 - 11.0 10e9/L    RBC Count 4.75 4.4 - 5.9 10e12/L    Hemoglobin 14.7 13.3 - 17.7 g/dL    Hematocrit 42.7 40.0 - 53.0 %    MCV 90 78 - 100 fl    MCH 30.9 26.5 - 33.0 pg    MCHC 34.4 31.5 - 36.5 g/dL    RDW 12.2 10.0 - 15.0 %    Platelet Count 361 150 - 450 10e9/L    Diff Method Automated Method     % Neutrophils 66.7 %    % Lymphocytes 23.0 %    % Monocytes 6.0 %    % Eosinophils 2.6 %    % Basophils 0.5 %    % Immature Granulocytes 1.2 %    Nucleated RBCs 0 0 /100    Absolute Neutrophil 6.1 1.6 - 8.3 10e9/L    Absolute Lymphocytes 2.1 0.8 - 5.3 10e9/L    Absolute Monocytes 0.6 0.0 - 1.3 10e9/L    Absolute Eosinophils 0.2 0.0 - 0.7 10e9/L    Absolute Basophils 0.1 0.0 - 0.2 10e9/L    Abs Immature Granulocytes 0.1 0 - 0.4 10e9/L    Absolute Nucleated RBC 0.0    Comprehensive metabolic panel     Status: Abnormal   Result Value Ref Range    Sodium 132 (L) 133 - 144 mmol/L    Potassium 3.9 3.4 - 5.3 mmol/L    Chloride 98 94 - 109 mmol/L    Carbon Dioxide 28 20 - 32 mmol/L    Anion Gap 6 3 - 14 mmol/L    Glucose 366 (H) 70 - 99 mg/dL    Urea Nitrogen 15 7 - 30 mg/dL    Creatinine 0.75 0.66 - 1.25 mg/dL    GFR Estimate >90 >60 mL/min/[1.73_m2]    GFR Estimate If Black >90 >60 mL/min/[1.73_m2]    Calcium 9.0 8.5 - 10.1 mg/dL    Bilirubin Total 0.6 0.2 - 1.3 mg/dL    Albumin 3.6 3.4 - 5.0 g/dL    Protein Total 7.8 6.8 - 8.8 g/dL     Alkaline Phosphatase 180 (H) 40 - 150 U/L    ALT 28 0 - 70 U/L    AST 12 0 - 45 U/L   Lipase     Status: None   Result Value Ref Range    Lipase 193 73 - 393 U/L   Creatinine POCT     Status: Abnormal   Result Value Ref Range    Creatinine 0.6 (L) 0.66 - 1.25 mg/dL    GFR Estimate >90 >60 mL/min/[1.73_m2]    GFR Estimate If Black >90 >60 mL/min/[1.73_m2]     Medications   ondansetron (ZOFRAN) injection 4 mg (4 mg Intravenous Given 1/10/21 1421)   HYDROmorphone (PF) (DILAUDID) injection 0.5 mg (0.5 mg Intravenous Given 1/10/21 1423)   sodium chloride (PF) 0.9% PF flush 78 mL (78 mLs Intravenous Given 1/10/21 1438)   iopamidol (ISOVUE-370) solution 111 mL (111 mLs Intravenous Given 1/10/21 1438)        Assessments & Plan (with Medical Decision Making)   53-year-old man presenting with left lower quadrant pain since last night.  Differential diagnosis: Acute diverticulitis, diverticular abscess, SBO, volvulus, kidney stone.    After thorough history and physical exam patient appears to be in no acute distress.  I will treat his pain with a dose of IV Dilaudid and obtain laboratory studies and CT abdomen/pelvis for further diagnostic evaluation.  I will treat his nausea with IV Zofran as well.  He agrees with the plan.    Patient's laboratory studies returned with no evidence of leukocytosis, WBC is normal at 9200.  There is no anemia, hemoglobin is normal at 14.7.  Electrolytes show no evidence of dehydration, creatinine is normal 0.75.  LFTs are normal other than slightly elevated alkaline phosphatase.  Lipase is normal.  Reviewed patient's CT abdomen/pelvis and I read the radiology report; no evidence of small or large bowel obstruction or any other etiology to explain his pain including acute diverticulitis or diverticular abscess.  No evidence of kidney stones either.  On reexamination his abdomen is soft, nontender, nondistended.  Patient states that his symptoms have resolved.  At this time he is stable for  discharge with outpatient primary care follow-up and return if his symptoms worsen.  He agrees with the plan.      This part of the medical record was transcribed by Cielo Salcedo Medical Scribe, from a dictation done by Braulio Wayne MD.     I have reviewed the nursing notes. I have reviewed the findings, diagnosis, plan and need for follow up with the patient.    Discharge Medication List as of 1/10/2021  4:02 PM          Final diagnoses:   Abdominal pain, left lower quadrant     I, Cielo Salcedo, am serving as a trained medical scribe to document services personally performed by Braulio Wayne MD, based on the provider's statements to me.      I, Braulio Wayne MD, was physically present and have reviewed and verified the accuracy of this note documented by Cielo Salcedo.     --  Braulio Wayne MD  [facility] EMERGENCY DEPARTMENT  1/10/2021     Braulio Wayne MD  01/10/21 [id number]

## 2021-01-21 ENCOUNTER — HOSPITAL ENCOUNTER (EMERGENCY)
Facility: CLINIC | Age: 56
Discharge: HOME | End: 2021-01-21
Attending: EMERGENCY MEDICINE | Admitting: EMERGENCY MEDICINE
Payer: COMMERCIAL

## 2021-01-21 VITALS
HEART RATE: 86 BPM | OXYGEN SATURATION: 99 % | RESPIRATION RATE: 14 BRPM | DIASTOLIC BLOOD PRESSURE: 86 MMHG | SYSTOLIC BLOOD PRESSURE: 124 MMHG | TEMPERATURE: 97.9 F

## 2021-01-21 DIAGNOSIS — E16.2 HYPOGLYCEMIA: ICD-10-CM

## 2021-01-21 LAB
ALBUMIN SERPL-MCNC: 3.6 G/DL (ref 3.4–5)
ALP SERPL-CCNC: 144 U/L (ref 40–150)
ALT SERPL W P-5'-P-CCNC: 22 U/L (ref 0–70)
ANION GAP SERPL CALCULATED.3IONS-SCNC: 9 MMOL/L (ref 3–14)
AST SERPL W P-5'-P-CCNC: 24 U/L (ref 0–45)
BASOPHILS # BLD AUTO: 0.1 10E9/L (ref 0–0.2)
BASOPHILS NFR BLD AUTO: 0.6 %
BILIRUB SERPL-MCNC: 0.5 MG/DL (ref 0.2–1.3)
BUN SERPL-MCNC: 20 MG/DL (ref 7–30)
CALCIUM SERPL-MCNC: 8.7 MG/DL (ref 8.5–10.1)
CHLORIDE SERPL-SCNC: 102 MMOL/L (ref 94–109)
CO2 SERPL-SCNC: 27 MMOL/L (ref 20–32)
CREAT SERPL-MCNC: 0.91 MG/DL (ref 0.66–1.25)
DIFFERENTIAL METHOD BLD: NORMAL
EOSINOPHIL # BLD AUTO: 0.2 10E9/L (ref 0–0.7)
EOSINOPHIL NFR BLD AUTO: 1.9 %
ERYTHROCYTE [DISTWIDTH] IN BLOOD BY AUTOMATED COUNT: 12.3 % (ref 10–15)
GFR SERPL CREATININE-BSD FRML MDRD: >90 ML/MIN/{1.73_M2}
GLUCOSE BLDC GLUCOMTR-MCNC: 154 MG/DL (ref 70–99)
GLUCOSE BLDC GLUCOMTR-MCNC: 173 MG/DL (ref 70–99)
GLUCOSE BLDC GLUCOMTR-MCNC: 285 MG/DL (ref 70–99)
GLUCOSE SERPL-MCNC: 109 MG/DL (ref 70–99)
HCT VFR BLD AUTO: 40.8 % (ref 40–53)
HGB BLD-MCNC: 14.1 G/DL (ref 13.3–17.7)
IMM GRANULOCYTES # BLD: 0.1 10E9/L (ref 0–0.4)
IMM GRANULOCYTES NFR BLD: 0.5 %
INTERPRETATION ECG - MUSE: NORMAL
LYMPHOCYTES # BLD AUTO: 2 10E9/L (ref 0.8–5.3)
LYMPHOCYTES NFR BLD AUTO: 20.4 %
MCH RBC QN AUTO: 31.2 PG (ref 26.5–33)
MCHC RBC AUTO-ENTMCNC: 34.6 G/DL (ref 31.5–36.5)
MCV RBC AUTO: 90 FL (ref 78–100)
MONOCYTES # BLD AUTO: 0.6 10E9/L (ref 0–1.3)
MONOCYTES NFR BLD AUTO: 6.3 %
NEUTROPHILS # BLD AUTO: 6.7 10E9/L (ref 1.6–8.3)
NEUTROPHILS NFR BLD AUTO: 70.3 %
NRBC # BLD AUTO: 0 10*3/UL
NRBC BLD AUTO-RTO: 0 /100
PLATELET # BLD AUTO: 359 10E9/L (ref 150–450)
POTASSIUM SERPL-SCNC: 3.2 MMOL/L (ref 3.4–5.3)
PROT SERPL-MCNC: 7.1 G/DL (ref 6.8–8.8)
RBC # BLD AUTO: 4.52 10E12/L (ref 4.4–5.9)
SODIUM SERPL-SCNC: 138 MMOL/L (ref 133–144)
WBC # BLD AUTO: 9.6 10E9/L (ref 4–11)

## 2021-01-21 PROCEDURE — 93010 ELECTROCARDIOGRAM REPORT: CPT | Performed by: EMERGENCY MEDICINE

## 2021-01-21 PROCEDURE — 85025 COMPLETE CBC W/AUTO DIFF WBC: CPT | Performed by: EMERGENCY MEDICINE

## 2021-01-21 PROCEDURE — 99284 EMERGENCY DEPT VISIT MOD MDM: CPT | Performed by: EMERGENCY MEDICINE

## 2021-01-21 PROCEDURE — 80053 COMPREHEN METABOLIC PANEL: CPT | Performed by: EMERGENCY MEDICINE

## 2021-01-21 PROCEDURE — 999N001017 HC STATISTIC GLUCOSE BY METER IP

## 2021-01-21 PROCEDURE — 93005 ELECTROCARDIOGRAM TRACING: CPT | Performed by: EMERGENCY MEDICINE

## 2021-01-21 PROCEDURE — 99283 EMERGENCY DEPT VISIT LOW MDM: CPT | Mod: 25 | Performed by: EMERGENCY MEDICINE

## 2021-01-21 NOTE — ED PROVIDER NOTES
ED Provider Note  [facility]      History     Chief Complaint   Patient presents with     Hypoglycemia     HPI  Andrew Lockwood is a 53 year old male who has a history of type 2 diabetes, bowel obstruction, HIV presenting with dizziness. The patient said he has not been eating that much and he has been taking his insulin. He was noted to have a low blood sugar by medics. He has eaten since prior to me seeing him. He does feel better at this point his dizziness is resolved. No fevers, chills, headaches, neck pain or stiffness. No chest pain, shortness of breath or other symptoms at this point. Patient otherwise appears well. No nausea or vomiting recently. No diarrhea. Recently.    Past Medical History  Past Medical History:   Diagnosis Date     AIDS (H)      Allergic rhinitis due to other allergen     DNS     Anal dysplasia      Chronic abdominal pain      CNS toxoplasmosis (H)      Diabetes type 2, controlled (H)      GERD (gastroesophageal reflux disease)      History of seizure      History of substance abuse (H)      HIV (human immunodeficiency virus infection) (H)      HLD (hyperlipidemia)      Lung nodules      Periungual wart      PTSD (post-traumatic stress disorder)      Sleep apnea     doesn't use CPAP     Past Surgical History:   Procedure Laterality Date     ANOSCOPY N/A 9/9/2020    Procedure: Exam Under Anesthesia, ANOSCOPY, fulgeration of rectal fissures with Rectal Biopsies;  Surgeon: Thanh Lundberg MD;  [facility]: [facility]     COLONOSCOPY Left 1/22/2016    Procedure: COMBINED COLONOSCOPY, SINGLE OR MULTIPLE BIOPSY/POLYPECTOMY BY BIOPSY;  Surgeon: Clark Saini MD;  Location: [facility] GI     HC EXPLORE UNDESC TESTIS,INGUIN/SCROTAL       LAPAROSCOPIC APPENDECTOMY N/A 1/31/2018    Procedure: LAPAROSCOPIC APPENDECTOMY;  LAPAROSCOPIC APPENDECTOMY;  Surgeon: Dawn Holt MD;  Location: [facility]     LAPAROSCOPY DIAGNOSTIC (GENERAL) N/A 7/26/2016    Procedure:  LAPAROSCOPY DIAGNOSTIC (GENERAL);  Surgeon: Susannah Arriaga MD;  [facility]     LAPAROSCOPY DIAGNOSTIC (GENERAL) N/A 4/16/2018    Procedure: LAPAROSCOPY DIAGNOSTIC (GENERAL);  Diagnostic laparoscopy and lysis of adhesions;  Surgeon: Prince Dowling MD;  Location: [facility]     OPTICAL TRACKING SYSTEM CRANIOTOMY, EXCISE TUMOR, COMBINED Left 4/10/2015    Procedure: COMBINED OPTICAL TRACKING SYSTEM CRANIOTOMY, EXCISE TUMOR;  Surgeon: Mirlande Colmenares MD;  [facility]     REPAIR GAMEKEEPER'S THUMB Right 12/2/2016    Procedure: REPAIR LIGAMENT ULNAR COLLATERAL THUMB (GAMEKEEPER'S);  Surgeon: Evin Zamorano MD;  [facility]     ZZC NONSPECIFIC PROCEDURE      right forearm fracture          NOVOLOG FLEXPEN 100 UNIT/ML soln       alcohol swab prep pads       bictegravir-emtricitabine-tenofovir (BIKTARVY) -25 MG per tablet       blood glucose (NO BRAND SPECIFIED) lancets standard       blood glucose (NO BRAND SPECIFIED) test strip       blood glucose (NO BRAND SPECIFIED) test strip       blood glucose (NO BRAND SPECIFIED) test strip       blood glucose calibration (NO BRAND SPECIFIED) solution       blood glucose calibration (NO BRAND SPECIFIED) solution       blood glucose monitoring (NO BRAND SPECIFIED) meter device kit       blood glucose monitoring (NO BRAND SPECIFIED) meter device kit       blood glucose monitoring (NO BRAND SPECIFIED) meter device kit       famotidine (PEPCID) 20 MG tablet       insulin glargine (BASAGLAR KWIKPEN) 100 UNIT/ML pen       insulin glargine (LANTUS SOLOSTAR) 100 UNIT/ML pen       insulin pen needle (B-D U/F) 31G X 8 MM miscellaneous       insulin pen needle (BD SCOTT U/F) 32G X 4 MM miscellaneous       Lancets (ONETOUCH DELICA PLUS [id number]) MISC       SENNA-docusate sodium (SENNA S) 8.6-50 MG tablet       STATIN NOT PRESCRIBED (INTENTIONAL)       thin (NO BRAND SPECIFIED) lancets       thin (NO BRAND SPECIFIED) lancets      Allergies   Allergen Reactions  "    Metformin      Abdominal pain     Milk [Lac Bovis] Hives     Tylenol [Acetaminophen] Itching     Dulaglutide Rash     Penicillin V Other (See Comments) and Rash     Diffuse maculopapular rash + feels \"high\", per pt.      Family History  Family History   Problem Relation Age of Onset     Diabetes Brother      Diabetes Father      Alzheimer Disease Father      Unknown/Adopted Mother      Diabetes Paternal Grandfather      Cancer No family hx of         no skin cancer     Skin Cancer No family hx of         no famiy hx of skin cancer     Glaucoma No family hx of      Macular Degeneration No family hx of      Social History   Social History     Tobacco Use     Smoking status: Current Every Day Smoker     Packs/day: 0.50     Years: 40.00     Pack years: 20.00     Types: Cigarettes     Smokeless tobacco: Former User   Substance Use Topics     Alcohol use: No     Alcohol/week: 0.0 standard drinks     Drinks per session: Patient refused     Comment: Last etoh in 2007     Drug use: Not Currently     Types: Marijuana, Methamphetamines     Comment: Sober 9 months       Past medical history, past surgical history, medications, allergies, family history, and social history were reviewed with the patient. No additional pertinent items.       Review of Systems  A complete review of systems was performed with pertinent positives and negatives noted in the HPI, and all other systems negative.    Physical Exam   BP: 130/84  Pulse: 87  Temp: 97.9  F (36.6  C)  Resp: 14  SpO2: 100 %  Physical Exam  Physical Exam   Constitutional: oriented to person, place, and time. appears well-developed and well-nourished.   HENT:   Head: Normocephalic and atraumatic. No nystagmus.  Neck: Normal range of motion.   Pulmonary/Chest: Effort normal. No respiratory distress.   Cardiac: No murmurs, rubs, gallops. RRR.  Abdominal: Abdomen soft, nontender, nondistended. No rebound tenderness.  MSK: Long bones without deformity or evidence of " trauma  Neurological: alert and oriented to person, place, and time. Cranial nerves II through XII intact. Finger-nose-finger intact bilaterally.  strength, bicep and tricep strength 5 out of 5 and symmetric bilaterally. Lower leg strength and hip strength out of five and symmetric bilaterally. Sensation grossly tact light touch in all extremities. Gait stable. No nystagmus or clonus.  Skin: Skin is warm and dry.   Psychiatric:  normal mood and affect.  behavior is normal. Thought content normal.     ED Course      Procedures             EKG Interpretation:      Interpreted by Venu Wolff MD  Time reviewed: 0305  Symptoms at time of EKG: dizziness   Rhythm: normal sinus   Rate: normal  Axis: normal  Ectopy: none  Conduction: normal  ST Segments/ T Waves: Nonspecific TW changes, similar to prior  Q Waves: none  Comparison to prior: Unchanged    Clinical Impression: no acute changes suggestive ischemia/infarction, intervals are normal.  No evidence of Brugada, WPW, ARVD      Results for orders placed or performed during the hospital encounter of 01/21/21   Comprehensive metabolic panel     Status: Abnormal   Result Value Ref Range    Sodium 138 133 - 144 mmol/L    Potassium 3.2 (L) 3.4 - 5.3 mmol/L    Chloride 102 94 - 109 mmol/L    Carbon Dioxide 27 20 - 32 mmol/L    Anion Gap 9 3 - 14 mmol/L    Glucose 109 (H) 70 - 99 mg/dL    Urea Nitrogen 20 7 - 30 mg/dL    Creatinine 0.91 0.66 - 1.25 mg/dL    GFR Estimate >90 >60 mL/min/[1.73_m2]    GFR Estimate If Black >90 >60 mL/min/[1.73_m2]    Calcium 8.7 8.5 - 10.1 mg/dL    Bilirubin Total 0.5 0.2 - 1.3 mg/dL    Albumin 3.6 3.4 - 5.0 g/dL    Protein Total 7.1 6.8 - 8.8 g/dL    Alkaline Phosphatase 144 40 - 150 U/L    ALT 22 0 - 70 U/L    AST 24 0 - 45 U/L   CBC with platelets differential     Status: None   Result Value Ref Range    WBC 9.6 4.0 - 11.0 10e9/L    RBC Count 4.52 4.4 - 5.9 10e12/L    Hemoglobin 14.1 13.3 - 17.7 g/dL    Hematocrit 40.8 40.0 - 53.0 %     MCV 90 78 - 100 fl    MCH 31.2 26.5 - 33.0 pg    MCHC 34.6 31.5 - 36.5 g/dL    RDW 12.3 10.0 - 15.0 %    Platelet Count 359 150 - 450 10e9/L    Diff Method Automated Method     % Neutrophils 70.3 %    % Lymphocytes 20.4 %    % Monocytes 6.3 %    % Eosinophils 1.9 %    % Basophils 0.6 %    % Immature Granulocytes 0.5 %    Nucleated RBCs 0 0 /100    Absolute Neutrophil 6.7 1.6 - 8.3 10e9/L    Absolute Lymphocytes 2.0 0.8 - 5.3 10e9/L    Absolute Monocytes 0.6 0.0 - 1.3 10e9/L    Absolute Eosinophils 0.2 0.0 - 0.7 10e9/L    Absolute Basophils 0.1 0.0 - 0.2 10e9/L    Abs Immature Granulocytes 0.1 0 - 0.4 10e9/L    Absolute Nucleated RBC 0.0    Glucose by meter     Status: Abnormal   Result Value Ref Range    Glucose 173 (H) 70 - 99 mg/dL   Glucose by meter     Status: Abnormal   Result Value Ref Range    Glucose 154 (H) 70 - 99 mg/dL   Glucose by meter     Status: Abnormal   Result Value Ref Range    Glucose 285 (H) 70 - 99 mg/dL   EKG 12-lead, tracing only     Status: None (Preliminary result)   Result Value Ref Range    Interpretation ECG Click View Image link to view waveform and result           Results for orders placed or performed during the hospital encounter of 01/21/21   Glucose by meter     Status: Abnormal   Result Value Ref Range    Glucose 173 (H) 70 - 99 mg/dL     Medications - No data to display     Assessments & Plan (with Medical Decision Making)   MDM  Labs show mildly decreased potassium, he had several sandwiches in both the superior.  Blood sugar elevated appropriately.  He has supplies to check his blood sugar and insulin at home.  He has no other needs at this point.  The patient appears and feels quite well.  He is requesting discharge, he has a place to go.  Patient be discharged, recommended strongly following up with his primary care provider and returning for worsening.  Likely etiology for symptoms due to hypoglycemia as he has not been able to secure food over the past 2 days.    I have  reviewed the nursing notes. I have reviewed the findings, diagnosis, plan and need for follow up with the patient.    New Prescriptions    No medications on file       Final diagnoses:   Hypoglycemia       --  Venu Wolff  [facility] EMERGENCY DEPARTMENT  1/21/2021     Venu Wolff MD  01/21/21 0513

## 2021-01-21 NOTE — DISCHARGE INSTRUCTIONS
Please make an appointment to follow up with Your Primary Care Provider as soon as possible.    Return to the emergency department if you have any further concerns.
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
## 2021-01-21 NOTE — ED NOTES
Bed: ED07  Expected date:   Expected time:   Means of arrival:   Comments:  H417 55M hypoglycemia. Triage yellow. ETA 5 minutes

## 2021-02-03 ENCOUNTER — TELEPHONE (OUTPATIENT)
Dept: INFECTIOUS DISEASES | Facility: CLINIC | Age: 56
End: 2021-02-03

## 2021-02-03 ENCOUNTER — APPOINTMENT (OUTPATIENT)
Dept: CT IMAGING | Facility: CLINIC | Age: 56
End: 2021-02-03
Attending: EMERGENCY MEDICINE
Payer: COMMERCIAL

## 2021-02-03 ENCOUNTER — HOSPITAL ENCOUNTER (EMERGENCY)
Facility: CLINIC | Age: 56
Discharge: HOME | End: 2021-02-03
Attending: EMERGENCY MEDICINE | Admitting: EMERGENCY MEDICINE
Payer: COMMERCIAL

## 2021-02-03 VITALS
BODY MASS INDEX: 26.46 KG/M2 | HEART RATE: 103 BPM | TEMPERATURE: 98.1 F | DIASTOLIC BLOOD PRESSURE: 82 MMHG | HEIGHT: 64 IN | RESPIRATION RATE: 16 BRPM | SYSTOLIC BLOOD PRESSURE: 120 MMHG | OXYGEN SATURATION: 97 % | WEIGHT: 155 LBS

## 2021-02-03 DIAGNOSIS — S09.90XA CLOSED HEAD INJURY, INITIAL ENCOUNTER: ICD-10-CM

## 2021-02-03 PROCEDURE — 72125 CT NECK SPINE W/O DYE: CPT | Mod: 26 | Performed by: RADIOLOGY

## 2021-02-03 PROCEDURE — 99284 EMERGENCY DEPT VISIT MOD MDM: CPT | Mod: 25 | Performed by: EMERGENCY MEDICINE

## 2021-02-03 PROCEDURE — 99284 EMERGENCY DEPT VISIT MOD MDM: CPT | Performed by: EMERGENCY MEDICINE

## 2021-02-03 PROCEDURE — 70450 CT HEAD/BRAIN W/O DYE: CPT

## 2021-02-03 PROCEDURE — 72125 CT NECK SPINE W/O DYE: CPT

## 2021-02-03 PROCEDURE — 70450 CT HEAD/BRAIN W/O DYE: CPT | Mod: 26 | Performed by: RADIOLOGY

## 2021-02-03 ASSESSMENT — ENCOUNTER SYMPTOMS
LIGHT-HEADEDNESS: 0
ABDOMINAL PAIN: 0
DIARRHEA: 0
SHORTNESS OF BREATH: 0
APPETITE CHANGE: 0
DIZZINESS: 0
VOMITING: 0
HEADACHES: 1
NECK PAIN: 1

## 2021-02-03 ASSESSMENT — MIFFLIN-ST. JEOR: SCORE: 1449.08
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
## 2021-02-03 NOTE — ED PROVIDER NOTES
[facility] EMERGENCY DEPARTMENT ([facility])  2/03/21  History     Chief Complaint   Patient presents with     Fall     Neck Pain     The history is provided by the patient and medical records. The history is limited by the condition of the patient.     Andrew Lockwood is a 53 year old male with a past medical history of DM II, recurrent SBOs, HIV/AIDS, CNS toxoplasmosis and HLD who presents via EMS for evaluation of headache and neck pain following a fall.  Patient states he was feeling fine prior to the fall.  He reportedly stumbled and fell hitting his left head on a dresser and then fell to the floor.  The patient denies losing consciousness but states it took him a minute to come to a standing position.  Patient complains of pain throughout his head and right-sided neck pain.  Patient denies dizziness or lightheadedness prior to the fall.  No chest pain, shortness of breath, change in appetite, abdominal pain, vomiting or diarrhea.  He denies numbness or weakness in extremities.  Denies any vision changes.  He denies any nausea or vomiting following the fall.  The patient denies prior head injuries.  He is not anticoagulated.    Past Medical History:   Diagnosis Date     AIDS (H)      Allergic rhinitis due to other allergen     DNS     Anal dysplasia      Chronic abdominal pain      CNS toxoplasmosis (H)      Diabetes type 2, controlled (H)      GERD (gastroesophageal reflux disease)      History of seizure      History of substance abuse (H)      HIV (human immunodeficiency virus infection) (H)      HLD (hyperlipidemia)      Lung nodules      Periungual wart      PTSD (post-traumatic stress disorder)      Sleep apnea     doesn't use CPAP       Past Surgical History:   Procedure Laterality Date     ANOSCOPY N/A 9/9/2020    Procedure: Exam Under Anesthesia, ANOSCOPY, fulgeration of rectal fissures with Rectal Biopsies;  Surgeon: Thanh Lundberg MD;  Location: [facility]     COLONOSCOPY Left 1/22/2016     Procedure: COMBINED COLONOSCOPY, SINGLE OR MULTIPLE BIOPSY/POLYPECTOMY BY BIOPSY;  Surgeon: Clark Saini MD;  Location: [facility]     HC EXPLORE UNDESC TESTIS,INGUIN/SCROTAL       LAPAROSCOPIC APPENDECTOMY N/A 1/31/2018    Procedure: LAPAROSCOPIC APPENDECTOMY;  LAPAROSCOPIC APPENDECTOMY;  Surgeon: Dawn Holt MD;  [facility]     LAPAROSCOPY DIAGNOSTIC (GENERAL) N/A 7/26/2016    Procedure: LAPAROSCOPY DIAGNOSTIC (GENERAL);  Surgeon: Susannah Arriaga MD;  [facility]     LAPAROSCOPY DIAGNOSTIC (GENERAL) N/A 4/16/2018    Procedure: LAPAROSCOPY DIAGNOSTIC (GENERAL);  Diagnostic laparoscopy and lysis of adhesions;  Surgeon: Prince Dowling MD;  Location[facility]     OPTICAL TRACKING SYSTEM CRANIOTOMY, EXCISE TUMOR, COMBINED Left 4/10/2015    Procedure: COMBINED OPTICAL TRACKING SYSTEM CRANIOTOMY, EXCISE TUMOR;  Surgeon: Mirlande Colmenares MD;  [facility]     REPAIR GAMEKEEPER'S THUMB Right 12/2/2016    Procedure: REPAIR LIGAMENT ULNAR COLLATERAL THUMB (GAMEKEEPER'S);  Surgeon: Evin Zamorano MD;  [facility]     ZZC NONSPECIFIC PROCEDURE      right forearm fracture       Family History   Problem Relation Age of Onset     Diabetes Brother      Diabetes Father      Alzheimer Disease Father      Unknown/Adopted Mother      Diabetes Paternal Grandfather      Cancer No family hx of         no skin cancer     Skin Cancer No family hx of         no famiy hx of skin cancer     Glaucoma No family hx of      Macular Degeneration No family hx of        Social History     Tobacco Use     Smoking status: Current Every Day Smoker     Packs/day: 0.50     Years: 40.00     Pack years: 20.00     Types: Cigarettes     Smokeless tobacco: Former User   Substance Use Topics     Alcohol use: No     Alcohol/week: 0.0 standard drinks     Drinks per session: Patient refused     Comment: Last etoh in 2007     No current facility-administered medications for this encounter.      Current  "Outpatient Medications   Medication     alcohol swab prep pads     bictegravir-emtricitabine-tenofovir (BIKTARVY) -25 MG per tablet     blood glucose (NO BRAND SPECIFIED) lancets standard     blood glucose (NO BRAND SPECIFIED) test strip     blood glucose (NO BRAND SPECIFIED) test strip     blood glucose (NO BRAND SPECIFIED) test strip     blood glucose calibration (NO BRAND SPECIFIED) solution     blood glucose calibration (NO BRAND SPECIFIED) solution     blood glucose monitoring (NO BRAND SPECIFIED) meter device kit     blood glucose monitoring (NO BRAND SPECIFIED) meter device kit     blood glucose monitoring (NO BRAND SPECIFIED) meter device kit     famotidine (PEPCID) 20 MG tablet     insulin glargine (BASAGLAR KWIKPEN) 100 UNIT/ML pen     insulin glargine (LANTUS SOLOSTAR) 100 UNIT/ML pen     insulin pen needle (B-D U/F) 31G X 8 MM miscellaneous     insulin pen needle (BD SCOTT U/F) 32G X 4 MM miscellaneous     Lancets (ONETOUCH DELICA PLUS [id number]) MISC     NOVOLOG FLEXPEN 100 UNIT/ML soln     SENNA-docusate sodium (SENNA S) 8.6-50 MG tablet     STATIN NOT PRESCRIBED (INTENTIONAL)     thin (NO BRAND SPECIFIED) lancets     thin (NO BRAND SPECIFIED) lancets        Allergies   Allergen Reactions     Metformin      Abdominal pain     Milk [Lac Bovis] Hives     Tylenol [Acetaminophen] Itching     Dulaglutide Rash     Penicillin V Other (See Comments) and Rash     Diffuse maculopapular rash + feels \"high\", per pt.          Review of Systems   Constitutional: Negative for appetite change.   Respiratory: Negative for shortness of breath.    Cardiovascular: Negative for chest pain.   Gastrointestinal: Negative for abdominal pain, diarrhea and vomiting.   Musculoskeletal: Positive for neck pain.   Neurological: Positive for headaches. Negative for dizziness and light-headedness.     A complete review of systems was performed with pertinent positives and negatives noted in the HPI, and all other systems " "negative.    Physical Exam   BP: 117/82  Pulse: 108  Temp: 98.1  F (36.7  C)  Resp: 22  Height: 162.6 cm (5' 4\")  Weight: 70.3 kg (155 lb)  SpO2: 98 %  Physical Exam  General: patient is alert and oriented and in no acute distress   Head: No scalp hematoma but abrasions to the forehead    EENT: moist mucus membranes without tonsillar erythema or exudates, pupils 2 mm, equal round and reactive, extraocular movements intact  Neck: In cervical spine collar with slight right-sided cervical tenderness to palpation  Cardiovascular: regular rate and rhythm, extremities warm and well perfused, no lower extremity edema  Pulmonary: lungs clear to auscultation bilaterally   Abdomen: soft, non-tender   Musculoskeletal: normal range of motion   Neurological: alert and oriented, moving all extremities symmetrically, CN II-XII intact, strength 5/5 and symmetric in , elbow flexion/extension, hip flexion/extension, knee flexion/extension and ankle plantar/dorsiflexion, sensation to light touch in distal upper and lower extremities intact, normal gait  Skin: warm, dry   ED Course     11:58 AM  The patient was seen and examined by Dr. Sharlene Tobin in Room ED 22.     Procedures                         No results found for any visits on 02/03/21.  Medications - No data to display     Assessments & Plan (with Medical Decision Making)   Mr. Lockwood is a 53 year old male with a past medical history of DM II, recurrent SBOs, HIV/AIDS, CNS toxoplasmosis and HLD who presents via EMS for evaluation of headache and neck pain following a fall.  He is hemodynamically stable and afebrile.  On exam he is neurovascularly intact.  He does report feeling sleepy and did have imaging of the head and cervical spine.  I have reviewed his CT of the head and cervical spine which show no evidence of fracture or intracranial hemorrhage.  On removal of his cervical collar he is able to fully range his neck without any difficulty or discomfort.  He does not " have any paresthesias radiating into the extremities on axial loading.  At this time do not feel that further imaging is indicated.  He is feeling less sleepy and was able to tolerate p.o. in the ED and ambulate independently.  Will plan to discharge to home with close return precautions.    I have reviewed the nursing notes. I have reviewed the findings, diagnosis, plan and need for follow up with the patient.    New Prescriptions    No medications on file       Final diagnoses:   Closed head injury, initial encounter     I, Lam Castro, am serving as a trained medical scribe to document services personally performed by Sharlene Tobin MD, based on the provider's statements to me.      I, Sharlene Tobin MD, was physically present and have reviewed and verified the accuracy of this note documented by Lam Castro.   --  Sharlene Tobin MD  [facility] EMERGENCY DEPARTMENT  2/3/2021     Sharlene Tobin MD  02/03/21 1429

## 2021-02-03 NOTE — DISCHARGE INSTRUCTIONS
You have been referred to [facility]  service.  The  Representative will assist you in the coordination of your concussion care as prescribed by your provider.  The  Representative will contact you within one business day, or you may contact the  Representative at [phone].    Avoid any strenuous exercise or activity until headache is fully resolved.  Slowly return to activity anicteric, nausea, dizziness or confusion returns decreased activity until symptoms resolved.  If you have worsening symptoms including severe headache, intractable vomiting or other concerns return to the emergency department for reevaluation.

## 2021-03-10 ENCOUNTER — HOSPITAL ENCOUNTER (EMERGENCY)
Facility: CLINIC | Age: 56
Discharge: LEFT BEFORE DISCHARGE | End: 2021-03-11
Attending: EMERGENCY MEDICINE | Admitting: EMERGENCY MEDICINE
Payer: COMMERCIAL

## 2021-03-10 VITALS
RESPIRATION RATE: 18 BRPM | SYSTOLIC BLOOD PRESSURE: 136 MMHG | TEMPERATURE: 97.8 F | HEART RATE: 106 BPM | OXYGEN SATURATION: 98 % | DIASTOLIC BLOOD PRESSURE: 87 MMHG

## 2021-03-10 DIAGNOSIS — R73.9 HYPERGLYCEMIA: ICD-10-CM

## 2021-03-10 DIAGNOSIS — Z79.4 ENCOUNTER FOR LONG-TERM (CURRENT) USE OF INSULIN (H): ICD-10-CM

## 2021-03-10 DIAGNOSIS — E86.0 DEHYDRATION: ICD-10-CM

## 2021-03-10 LAB
ALBUMIN SERPL-MCNC: 3.7 G/DL (ref 3.4–5)
ALBUMIN UR-MCNC: 30 MG/DL
ALP SERPL-CCNC: 155 U/L (ref 40–150)
ALT SERPL W P-5'-P-CCNC: 39 U/L (ref 0–70)
ANION GAP SERPL CALCULATED.3IONS-SCNC: 9 MMOL/L (ref 3–14)
APPEARANCE UR: CLEAR
AST SERPL W P-5'-P-CCNC: 22 U/L (ref 0–45)
BASOPHILS # BLD AUTO: 0.1 10E9/L (ref 0–0.2)
BASOPHILS NFR BLD AUTO: 0.7 %
BILIRUB SERPL-MCNC: 0.6 MG/DL (ref 0.2–1.3)
BILIRUB UR QL STRIP: NEGATIVE
BUN SERPL-MCNC: 19 MG/DL (ref 7–30)
CALCIUM SERPL-MCNC: 9.5 MG/DL (ref 8.5–10.1)
CHLORIDE SERPL-SCNC: 102 MMOL/L (ref 94–109)
CO2 SERPL-SCNC: 23 MMOL/L (ref 20–32)
COLOR UR AUTO: ABNORMAL
CREAT SERPL-MCNC: 1.02 MG/DL (ref 0.66–1.25)
DIFFERENTIAL METHOD BLD: ABNORMAL
EOSINOPHIL # BLD AUTO: 0.2 10E9/L (ref 0–0.7)
EOSINOPHIL NFR BLD AUTO: 1.9 %
ERYTHROCYTE [DISTWIDTH] IN BLOOD BY AUTOMATED COUNT: 12.7 % (ref 10–15)
GFR SERPL CREATININE-BSD FRML MDRD: 82 ML/MIN/{1.73_M2}
GLUCOSE BLDC GLUCOMTR-MCNC: 338 MG/DL (ref 70–99)
GLUCOSE BLDC GLUCOMTR-MCNC: 431 MG/DL (ref 70–99)
GLUCOSE SERPL-MCNC: 446 MG/DL (ref 70–99)
GLUCOSE UR STRIP-MCNC: >1000 MG/DL
HCT VFR BLD AUTO: 45.6 % (ref 40–53)
HGB BLD-MCNC: 16 G/DL (ref 13.3–17.7)
HGB UR QL STRIP: NEGATIVE
IMM GRANULOCYTES # BLD: 0.2 10E9/L (ref 0–0.4)
IMM GRANULOCYTES NFR BLD: 1.6 %
KETONES UR STRIP-MCNC: 20 MG/DL
LEUKOCYTE ESTERASE UR QL STRIP: NEGATIVE
LYMPHOCYTES # BLD AUTO: 2.6 10E9/L (ref 0.8–5.3)
LYMPHOCYTES NFR BLD AUTO: 21.6 %
MCH RBC QN AUTO: 31.1 PG (ref 26.5–33)
MCHC RBC AUTO-ENTMCNC: 35.1 G/DL (ref 31.5–36.5)
MCV RBC AUTO: 89 FL (ref 78–100)
MONOCYTES # BLD AUTO: 0.8 10E9/L (ref 0–1.3)
MONOCYTES NFR BLD AUTO: 7 %
NEUTROPHILS # BLD AUTO: 8.1 10E9/L (ref 1.6–8.3)
NEUTROPHILS NFR BLD AUTO: 67.2 %
NITRATE UR QL: NEGATIVE
NRBC # BLD AUTO: 0 10*3/UL
NRBC BLD AUTO-RTO: 0 /100
PH UR STRIP: 5.5 PH (ref 5–7)
PLATELET # BLD AUTO: 437 10E9/L (ref 150–450)
POTASSIUM SERPL-SCNC: 4.1 MMOL/L (ref 3.4–5.3)
PROT SERPL-MCNC: 7.9 G/DL (ref 6.8–8.8)
RBC # BLD AUTO: 5.14 10E12/L (ref 4.4–5.9)
RBC #/AREA URNS AUTO: 1 /HPF (ref 0–2)
SODIUM SERPL-SCNC: 134 MMOL/L (ref 133–144)
SOURCE: ABNORMAL
SP GR UR STRIP: 1.03 (ref 1–1.03)
SQUAMOUS #/AREA URNS AUTO: 0 /HPF (ref 0–1)
UROBILINOGEN UR STRIP-MCNC: NORMAL MG/DL (ref 0–2)
WBC # BLD AUTO: 12 10E9/L (ref 4–11)
WBC #/AREA URNS AUTO: 1 /HPF (ref 0–5)

## 2021-03-10 PROCEDURE — 80053 COMPREHEN METABOLIC PANEL: CPT | Performed by: EMERGENCY MEDICINE

## 2021-03-10 PROCEDURE — 81001 URINALYSIS AUTO W/SCOPE: CPT | Performed by: EMERGENCY MEDICINE

## 2021-03-10 PROCEDURE — 93010 ELECTROCARDIOGRAM REPORT: CPT | Performed by: EMERGENCY MEDICINE

## 2021-03-10 PROCEDURE — 258N000003 HC RX IP 258 OP 636: Performed by: EMERGENCY MEDICINE

## 2021-03-10 PROCEDURE — 99284 EMERGENCY DEPT VISIT MOD MDM: CPT | Mod: 25 | Performed by: EMERGENCY MEDICINE

## 2021-03-10 PROCEDURE — 85025 COMPLETE CBC W/AUTO DIFF WBC: CPT | Performed by: EMERGENCY MEDICINE

## 2021-03-10 PROCEDURE — 99284 EMERGENCY DEPT VISIT MOD MDM: CPT | Mod: 25

## 2021-03-10 PROCEDURE — 250N000009 HC RX 250: Performed by: EMERGENCY MEDICINE

## 2021-03-10 PROCEDURE — 96360 HYDRATION IV INFUSION INIT: CPT

## 2021-03-10 PROCEDURE — 250N000013 HC RX MED GY IP 250 OP 250 PS 637: Performed by: EMERGENCY MEDICINE

## 2021-03-10 PROCEDURE — 93005 ELECTROCARDIOGRAM TRACING: CPT

## 2021-03-10 PROCEDURE — 999N001017 HC STATISTIC GLUCOSE BY METER IP

## 2021-03-10 RX ORDER — SODIUM CHLORIDE 9 MG/ML
INJECTION, SOLUTION INTRAVENOUS CONTINUOUS
Status: DISCONTINUED | OUTPATIENT
Start: 2021-03-10 | End: 2021-03-11 | Stop reason: HOSPADM

## 2021-03-10 RX ADMIN — SODIUM CHLORIDE 1000 ML: 9 INJECTION, SOLUTION INTRAVENOUS at 22:02

## 2021-03-10 RX ADMIN — SODIUM CHLORIDE 1000 ML: 9 INJECTION, SOLUTION INTRAVENOUS at 21:09

## 2021-03-10 RX ADMIN — LIDOCAINE HYDROCHLORIDE 15 ML: 20 SOLUTION ORAL; TOPICAL at 22:01

## 2021-03-10 RX ADMIN — HUMAN INSULIN 10 UNITS: 100 INJECTION, SOLUTION SUBCUTANEOUS at 22:52

## 2021-03-10 ASSESSMENT — ENCOUNTER SYMPTOMS
PALPITATIONS: 0
NAUSEA: 0
CONSTIPATION: 0
VOMITING: 0
ABDOMINAL PAIN: 0
EYE PAIN: 0
SHORTNESS OF BREATH: 0
POLYDIPSIA: 1
FREQUENCY: 0
DYSURIA: 0
CONFUSION: 0
DIZZINESS: 0
ABDOMINAL DISTENTION: 0
CHILLS: 0
SORE THROAT: 0
FEVER: 0
ARTHRALGIAS: 0
WEAKNESS: 1
COLOR CHANGE: 0
DIFFICULTY URINATING: 0
COUGH: 0
DIARRHEA: 0
HEADACHES: 0
NECK PAIN: 0
FATIGUE: 0
CHEST TIGHTNESS: 0
BACK PAIN: 0
MYALGIAS: 0

## 2021-03-11 LAB — INTERPRETATION ECG - MUSE: NORMAL

## 2021-03-11 NOTE — DISCHARGE INSTRUCTIONS
Your generalized weakness is likely caused by your chronically elevated blood sugar and some dehydration.  You were treated with insulin and IV fluids.  It is imperative that you refill your insulin prescription and follow-up with your doctor for management of this chronic issue.  Please return to the ED with any new or worsening symptoms.

## 2021-03-11 NOTE — ED NOTES
This RN came to check on pt and check BS, Pt was not in room or bathroom, hospital gown was on bed, pt is suspected of leaving without being discharged.

## 2021-03-11 NOTE — ED PROVIDER NOTES
[place] EMERGENCY DEPARTMENT ([facility])  March 10, 2021  History     Chief Complaint   Patient presents with     Generalized Weakness     The history is provided by the patient.     Andrew Lockwood is a 53 year old male with history of HIV and type II diabetes mellitus who presents to the Emergency Department for evaluation of generalized weakness, polydipsia and polyuria.  Patient reports that his symptoms have been present for the last 3 days and his blood glucose levels have been uncontrolled over that time.  Patient reports that his sugars typically are in the 100s/200s, but over the last 3 days they have been up in the 500s.  The patient reports that he has been taking his long-acting insulin, but ran out of his short acting 3 days ago.  Patient reports that he does have a physician who prescribes his medications, but he has not yet been able to get his short acting insulin refilled.  The patient reports that he also has difficulty managing his diabetes mellitus as he is currently homeless.  Patient denies any fevers, headaches or neck pain.  He also denies any recent falls or traumas.    PAST MEDICAL HISTORY:   Past Medical History:   Diagnosis Date     AIDS (H)      Allergic rhinitis due to other allergen     DNS     Anal dysplasia      Chronic abdominal pain      CNS toxoplasmosis (H)      Diabetes type 2, controlled (H)      GERD (gastroesophageal reflux disease)      History of seizure      History of substance abuse (H)      HIV (human immunodeficiency virus infection) (H)      HLD (hyperlipidemia)      Lung nodules      Periungual wart      PTSD (post-traumatic stress disorder)      Sleep apnea     doesn't use CPAP       PAST SURGICAL HISTORY:   Past Surgical History:   Procedure Laterality Date     ANOSCOPY N/A 9/9/2020    Procedure: Exam Under Anesthesia, ANOSCOPY, fulgeration of rectal fissures with Rectal Biopsies;  Surgeon: Thanh Lundberg MD;  [facility]     COLONOSCOPY Left  1/22/2016    Procedure: COMBINED COLONOSCOPY, SINGLE OR MULTIPLE BIOPSY/POLYPECTOMY BY BIOPSY;  Surgeon: Clark Saini MD;  Location: [facility] GI     HC EXPLORE UNDESC TESTIS,INGUIN/SCROTAL       LAPAROSCOPIC APPENDECTOMY N/A 1/31/2018    Procedure: LAPAROSCOPIC APPENDECTOMY;  LAPAROSCOPIC APPENDECTOMY;  Surgeon: Dawn Holt MD;  [facility] OR     LAPAROSCOPY DIAGNOSTIC (GENERAL) N/A 7/26/2016    Procedure: LAPAROSCOPY DIAGNOSTIC (GENERAL);  Surgeon: Susannah Arriaga MD;  [facility]     LAPAROSCOPY DIAGNOSTIC (GENERAL) N/A 4/16/2018    Procedure: LAPAROSCOPY DIAGNOSTIC (GENERAL);  Diagnostic laparoscopy and lysis of adhesions;  Surgeon: Prince Dowling MD;  [facility] OR     OPTICAL TRACKING SYSTEM CRANIOTOMY, EXCISE TUMOR, COMBINED Left 4/10/2015    Procedure: COMBINED OPTICAL TRACKING SYSTEM CRANIOTOMY, EXCISE TUMOR;  Surgeon: Mirlande Colmenares MD;  [facility] OR     REPAIR GAMEKEEPER'S THUMB Right 12/2/2016    Procedure: REPAIR LIGAMENT ULNAR COLLATERAL THUMB (GAMEKEEPER'S);  Surgeon: Evin Zamorano MD;  [facility] OR     ZZC NONSPECIFIC PROCEDURE      right forearm fracture       Past medical history, past surgical history, medications, and allergies were reviewed with the patient. Additional pertinent items: None    FAMILY HISTORY:   Family History   Problem Relation Age of Onset     Diabetes Brother      Diabetes Father      Alzheimer Disease Father      Unknown/Adopted Mother      Diabetes Paternal Grandfather      Cancer No family hx of         no skin cancer     Skin Cancer No family hx of         no famiy hx of skin cancer     Glaucoma No family hx of      Macular Degeneration No family hx of        SOCIAL HISTORY:   Social History     Tobacco Use     Smoking status: Current Every Day Smoker     Packs/day: 0.50     Years: 40.00     Pack years: 20.00     Types: Cigarettes     Smokeless tobacco: Former User   Substance Use Topics     Alcohol use: No      Alcohol/week: 0.0 standard drinks     Drinks per session: Patient refused     Comment: Last etoh in 2007     Social history was reviewed with the patient. Additional pertinent items: None      Discharge Medication List as of 3/11/2021 12:25 AM      CONTINUE these medications which have NOT CHANGED    Details   alcohol swab prep pads Use to swab area of injection/todd as directed.Disp-100 each, [id number]      bictegravir-emtricitabine-tenofovir (BIKTARVY) -25 MG per tablet Take 1 tablet by mouth daily, Disp-30 tablet, R-0, Local Print      blood glucose (NO BRAND SPECIFIED) lancets standard Use to test blood sugar four times daily or as directed.Disp-100 each, [id number]      !! blood glucose (NO BRAND SPECIFIED) test strip Use to test blood sugar 4 times daily or as directed. To accompany:whatever is covered, Disp-360 strip, R-3, E-Prescribe      !! blood glucose (NO BRAND SPECIFIED) test strip Use to test blood sugar 4-6 times daily or as directed. To accompany: Blood Glucose Monitor Brands: per insurance., Disp-100 strip, R-6, E-Prescribe      !! blood glucose (NO BRAND SPECIFIED) test strip 1 strip by In Vitro route 4 times daily (before meals and nightly) Use to test blood sugars 4 times daily or as directed, Disp-100 strip, R-11, E-Prescribe      !! blood glucose calibration (NO BRAND SPECIFIED) solution To accompany: whatever is covered, Disp-1 Bottle, R-3, E-Prescribe      !! blood glucose calibration (NO BRAND SPECIFIED) solution To accompany: Blood Glucose Monitor Brands: per insurance., Disp-1 Bottle, R-0, E-Prescribe      !! blood glucose monitoring (NO BRAND SPECIFIED) meter device kit Use to test blood sugar 4 times daily or as directed. Preferred blood glucose meter OR supplies to accompany: whatever is coveredDisp-1 kit, [id number]      !! blood glucose monitoring (NO BRAND SPECIFIED) meter device kit Use to test blood sugar 4-6 times daily or as directed. Preferred blood glucose  meter OR supplies to accompany: Blood Glucose Monitor Brands: per insurance.Disp-1 kit, [id number]      !! blood glucose monitoring (NO BRAND SPECIFIED) meter device kit Use to test blood sugar 4 times daily or as directed.Disp-1 kit, [id number] provide meter that is covered by insurance.      famotidine (PEPCID) 20 MG tablet Take 1 tablet (20 mg) by mouth 2 times daily, Disp-30 tablet, R-11, Local Print      !! insulin glargine (BASAGLAR KWIKPEN) 100 UNIT/ML pen Inject 35 Units Subcutaneous 2 times daily, Disp-15 mL, R-15, E-PrescribeIf Basaglar is not covered by insurance, may substitute Lantus at same dose and frequency.        !! insulin glargine (LANTUS SOLOSTAR) 100 UNIT/ML pen Inject 35 Units Subcutaneous 2 times daily, Disp-75 mL, R-0, Local PrintIf Lantus is not covered by insurance, may substitute Basaglar at same dose and frequency.        !! insulin pen needle (B-D U/F) 31G X 8 MM miscellaneous Use 1 pen needles daily or as directed.Disp-100 each, [id number]      !! insulin pen needle (BD SCOTT U/F) 32G X 4 MM miscellaneous USE 3 TO 4 NEEDLES DAILYDisp-400 each, [id number]      !! Lancets (ONETOUCH DELICA PLUS [id number]) MISC USE TO TEST BLOOD SUGAR FOUR TIMES DAILY OR AS DIRECTED, Disp-100 each, R-11, E-Prescribe      NOVOLOG FLEXPEN 100 UNIT/ML soln Inject 20 units with each meal, along with sliding scale 1 unit per 25mg/dL over 140 before meals and over 200 at bedtime. Max daily dose 90 units, Disp-75 mL, R-0, OLESYA, Local Print      SENNA-docusate sodium (SENNA S) 8.6-50 MG tablet Take 1 tablet by mouth 2 times daily Hold for loose stools., Disp-60 tablet, R-5, E-Prescribe      STATIN NOT PRESCRIBED (INTENTIONAL) Reason Statin was Not Prescribed: OTHER - Enter reason is comments section (This option does NOTexclude patient from measure) / noted in chart that is recommended multiple times      !! thin (NO BRAND SPECIFIED) lancets Use with lanceting device. To accompany: Test 4 times  "daily with whatever is covered, Disp-360 each, R-3, E-Prescribe      !! thin (NO BRAND SPECIFIED) lancets Use with lanceting device. To accompany: Blood Glucose Monitor Brands: per insurance., Disp-100 each, R-6, E-Prescribe       !! - Potential duplicate medications found. Please discuss with provider.             Allergies   Allergen Reactions     Metformin      Abdominal pain     Milk [Lac Bovis] Hives     Tylenol [Acetaminophen] Itching     Dulaglutide Rash     Penicillin V Other (See Comments) and Rash     Diffuse maculopapular rash + feels \"high\", per pt.         Review of Systems   Constitutional: Negative for chills, fatigue and fever.   HENT: Negative for congestion and sore throat.    Eyes: Negative for pain and visual disturbance.   Respiratory: Negative for cough, chest tightness and shortness of breath.    Cardiovascular: Negative for chest pain and palpitations.   Gastrointestinal: Negative for abdominal distention, abdominal pain, constipation, diarrhea, nausea and vomiting.   Endocrine: Positive for polydipsia and polyuria.   Genitourinary: Negative for difficulty urinating, dysuria, frequency and urgency.   Musculoskeletal: Negative for arthralgias, back pain, myalgias and neck pain.   Skin: Negative for color change and rash.   Neurological: Positive for weakness (generalized). Negative for dizziness and headaches.   Psychiatric/Behavioral: Negative for confusion.       Physical Exam   BP: 133/80  Pulse: 127  Temp: 97.8  F (36.6  C)  Resp: 18  SpO2: 97 %      Physical Exam  Vitals signs and nursing note reviewed.   Constitutional:       General: He is not in acute distress.     Appearance: Normal appearance. He is not ill-appearing or toxic-appearing.   HENT:      Head: Normocephalic and atraumatic.      Nose: Nose normal.      Mouth/Throat:      Mouth: Mucous membranes are dry.   Eyes:      Pupils: Pupils are equal, round, and reactive to light.   Neck:      Musculoskeletal: Normal range of " motion. No neck rigidity.   Cardiovascular:      Rate and Rhythm: Normal rate.      Pulses: Normal pulses.      Heart sounds: Normal heart sounds.   Pulmonary:      Effort: Pulmonary effort is normal. No respiratory distress.      Breath sounds: Normal breath sounds.   Abdominal:      General: Abdomen is flat. There is no distension.   Musculoskeletal: Normal range of motion.         General: No swelling or deformity.   Skin:     General: Skin is warm.      Capillary Refill: Capillary refill takes less than 2 seconds.   Neurological:      General: No focal deficit present.      Mental Status: He is alert and oriented to person, place, and time.      Cranial Nerves: No cranial nerve deficit.      Sensory: No sensory deficit.      Motor: No weakness.      Coordination: Coordination normal.      Comments: NIH 0   Psychiatric:         Mood and Affect: Mood normal.         ED Course        Procedures     8:54 PM  The patient was seen and examined by Kofi Mauro DO in Room ED06.                EKG Interpretation:      Interpreted by Kofi Mauro DO  Time reviewed: 2115  Symptoms at time of EKG: weakness/tachycardia   Rhythm: sinus tach  Rate: normal  Axis: normal  Ectopy: none  Conduction: normal  ST Segments/ T Waves: No ST-T wave changes  Q Waves: none  Comparison to prior: Unchanged    Clinical Impression: sinus tach, o/w normal EKG              Results for orders placed or performed during the hospital encounter of 03/10/21 (from the past 24 hour(s))   Glucose by meter   Result Value Ref Range    Glucose 431 (H) 70 - 99 mg/dL   CBC with platelets differential   Result Value Ref Range    WBC 12.0 (H) 4.0 - 11.0 10e9/L    RBC Count 5.14 4.4 - 5.9 10e12/L    Hemoglobin 16.0 13.3 - 17.7 g/dL    Hematocrit 45.6 40.0 - 53.0 %    MCV 89 78 - 100 fl    MCH 31.1 26.5 - 33.0 pg    MCHC 35.1 31.5 - 36.5 g/dL    RDW 12.7 10.0 - 15.0 %    Platelet Count 437 150 - 450 10e9/L    Diff Method Automated Method     % Neutrophils  67.2 %    % Lymphocytes 21.6 %    % Monocytes 7.0 %    % Eosinophils 1.9 %    % Basophils 0.7 %    % Immature Granulocytes 1.6 %    Nucleated RBCs 0 0 /100    Absolute Neutrophil 8.1 1.6 - 8.3 10e9/L    Absolute Lymphocytes 2.6 0.8 - 5.3 10e9/L    Absolute Monocytes 0.8 0.0 - 1.3 10e9/L    Absolute Eosinophils 0.2 0.0 - 0.7 10e9/L    Absolute Basophils 0.1 0.0 - 0.2 10e9/L    Abs Immature Granulocytes 0.2 0 - 0.4 10e9/L    Absolute Nucleated RBC 0.0    Comprehensive metabolic panel   Result Value Ref Range    Sodium 134 133 - 144 mmol/L    Potassium 4.1 3.4 - 5.3 mmol/L    Chloride 102 94 - 109 mmol/L    Carbon Dioxide 23 20 - 32 mmol/L    Anion Gap 9 3 - 14 mmol/L    Glucose 446 (H) 70 - 99 mg/dL    Urea Nitrogen 19 7 - 30 mg/dL    Creatinine 1.02 0.66 - 1.25 mg/dL    GFR Estimate 82 >60 mL/min/[1.73_m2]    GFR Estimate If Black >90 >60 mL/min/[1.73_m2]    Calcium 9.5 8.5 - 10.1 mg/dL    Bilirubin Total 0.6 0.2 - 1.3 mg/dL    Albumin 3.7 3.4 - 5.0 g/dL    Protein Total 7.9 6.8 - 8.8 g/dL    Alkaline Phosphatase 155 (H) 40 - 150 U/L    ALT 39 0 - 70 U/L    AST 22 0 - 45 U/L   UA reflex to Microscopic and Culture    Specimen: Urine clean catch   Result Value Ref Range    Color Urine Light Yellow     Appearance Urine Clear     Glucose Urine >1000 (A) NEG^Negative mg/dL    Bilirubin Urine Negative NEG^Negative    Ketones Urine 20 (A) NEG^Negative mg/dL    Specific Gravity Urine 1.034 1.003 - 1.035    Blood Urine Negative NEG^Negative    pH Urine 5.5 5.0 - 7.0 pH    Protein Albumin Urine 30 (A) NEG^Negative mg/dL    Urobilinogen mg/dL Normal 0.0 - 2.0 mg/dL    Nitrite Urine Negative NEG^Negative    Leukocyte Esterase Urine Negative NEG^Negative    Source Urine     RBC Urine 1 0 - 2 /HPF    WBC Urine 1 0 - 5 /HPF    Squamous Epithelial /HPF Urine 0 0 - 1 /HPF   Glucose by meter   Result Value Ref Range    Glucose 338 (H) 70 - 99 mg/dL     *Note: Due to a large number of results and/or encounters for the requested time  period, some results have not been displayed. A complete set of results can be found in Results Review.     Medications   0.9% sodium chloride BOLUS (0 mLs Intravenous Stopped 3/10/21 2201)     Followed by   0.9% sodium chloride BOLUS (1,000 mLs Intravenous New Bag 3/10/21 2202)     Followed by   sodium chloride 0.9% infusion (has no administration in time range)   lidocaine (XYLOCAINE) 2 % 7.5 mL, alum & mag hydroxide-simethicone (MAALOX) 7.5 mL GI Cocktail (15 mLs Oral Given 3/10/21 2201)   insulin regular 1 unit/mL injection 10 Units (10 Units Intravenous Given 3/10/21 2252)             Assessments & Plan (with Medical Decision Making)   Patient with history of HIV and type 2 diabetes presents emergency department complaint of generalized weakness.  Of note, he is homeless.    Differential diagnosis includes dehydration, electrolyte abnormality, hyperglycemia, DKA, diabetic coma, CVA, recurrent toxoplasmosis    On arrival, patient has normal vital signs aside from some mild tachycardia.  He overall appears well.  His neurological exam is nonfocal.  He has 5 out of 5 motor strength of all extremities.  Is able to ambulate without issue.  Does have some dry mucous membranes.  Abdomen soft nontender.  Lungs are clear.    Plan for basic labs include CBC, CMP, UA.  No indication for CT scan or other imaging given normal exam.    Blood sugar is 446.  Patient was given a liter of normal saline and 10 units of regular insulin.  Recheck is 338.    Remainder of labs reviewed by me, no acute abnormalities.  Urinalysis clear.      On reassessment, patient states he feels much better after IV fluids and slight improvement in his blood sugar.  I offered observation for diabetic education, patient would rather be discharged.  He states that he does have a prescription for his insulin which can be filled.  I told him that he can go to any pharmacy to get this done.    I have reviewed the nursing notes.    I have reviewed the  findings, diagnosis, plan and need for follow up with the patient.    Discharge Medication List as of 3/11/2021 12:25 AM          Final diagnoses:   Hyperglycemia   Dehydration     Yunior KELLY, am serving as a trained medical scribe to document services personally performed by Kofi Mauro DO, based on the provider's statements to me.     Kofi KELLY DO, was physically present and have reviewed and verified the accuracy of this note documented by Yunior Mora.    Kofi Mauro DO  3/10/2021   [facility] ED EMERGENCY DEPARTMENT     Kofi Mauro DO  03/11/21 0111

## 2021-03-11 NOTE — ED TRIAGE NOTES
"Arrives ambulatory to triage c/o generalized weakness x 3 days. Pt states \"I think it's my HIV.\" Reports fatigue and thirst as well. A&Ox4 in triage. Bg 431 in triage.  "

## 2021-04-16 ENCOUNTER — HOSPITAL ENCOUNTER (EMERGENCY)
Facility: CLINIC | Age: 56
Discharge: HOME | End: 2021-04-16
Payer: COMMERCIAL

## 2021-04-22 DIAGNOSIS — Z21 HIV INFECTION, UNSPECIFIED SYMPTOM STATUS (H): ICD-10-CM

## 2021-04-22 DIAGNOSIS — Z11.3 ROUTINE SCREENING FOR STI (SEXUALLY TRANSMITTED INFECTION): Primary | ICD-10-CM

## 2021-04-22 NOTE — PROGRESS NOTES
Per [facility] Ambulatory Care Protocol, Pt is due for routine labs based on disease state or monitoring of medications. Lab orders entered per clinic protocol.  Toyin Montiel RN

## 2021-05-04 ENCOUNTER — HOSPITAL ENCOUNTER (EMERGENCY)
Facility: CLINIC | Age: 56
Discharge: LEFT BEFORE DISCHARGE | End: 2021-05-04
Payer: COMMERCIAL

## 2021-05-04 VITALS
HEART RATE: 110 BPM | WEIGHT: 175 LBS | OXYGEN SATURATION: 97 % | HEIGHT: 65 IN | SYSTOLIC BLOOD PRESSURE: 154 MMHG | DIASTOLIC BLOOD PRESSURE: 84 MMHG | RESPIRATION RATE: 16 BRPM | BODY MASS INDEX: 29.16 KG/M2 | TEMPERATURE: 98.2 F

## 2021-05-04 ASSESSMENT — MIFFLIN-ST. JEOR: SCORE: 1545.67

## 2021-05-04 NOTE — ED PROVIDER NOTES
ED Provider Note  [facility]      History     Chief Complaint   Patient presents with     Puncture Wound     HPI  Andrew Lockwood is a 55 year old male with a PMH of homelessness, DM2, HLD, HIV, AIDS, SBO, adynamic ileus, S/P appendectomy, toxoplasma encephalitis, brain lesion, seizure and pneumonia who presents to the ED today complaining of a puncture wound by glass.***    Patient was recently seen here in the ED on 3/10/2021 complaining of generalized weakness, polydipsia and polyuria.  Urinalysis was normal.  Patient stated he felt much better after receiving IV fluids.    Past Medical History  Past Medical History:   Diagnosis Date     AIDS (H)      Allergic rhinitis due to other allergen     DNS     Anal dysplasia      Chronic abdominal pain      CNS toxoplasmosis (H)      Diabetes type 2, controlled (H)      GERD (gastroesophageal reflux disease)      History of seizure      History of substance abuse (H)      HIV (human immunodeficiency virus infection) (H)      HLD (hyperlipidemia)      Lung nodules      Periungual wart      PTSD (post-traumatic stress disorder)      Sleep apnea     doesn't use CPAP     Past Surgical History:   Procedure Laterality Date     ANOSCOPY N/A 9/9/2020    Procedure: Exam Under Anesthesia, ANOSCOPY, fulgeration of rectal fissures with Rectal Biopsies;  Surgeon: Thanh Lundberg MD;  [facility] OR     COLONOSCOPY Left 1/22/2016    Procedure: COMBINED COLONOSCOPY, SINGLE OR MULTIPLE BIOPSY/POLYPECTOMY BY BIOPSY;  Surgeon: Clark Saini MD;  [facility]: [facility] GI     HC EXPLORE UNDESC TESTIS,INGUIN/SCROTAL       LAPAROSCOPIC APPENDECTOMY N/A 1/31/2018    Procedure: LAPAROSCOPIC APPENDECTOMY;  LAPAROSCOPIC APPENDECTOMY;  Surgeon: Dawn Holt MD;  [facility]     LAPAROSCOPY DIAGNOSTIC (GENERAL) N/A 7/26/2016    Procedure: LAPAROSCOPY DIAGNOSTIC (GENERAL);  Surgeon: Susannah Arriaga MD;  [facility] OR     LAPAROSCOPY DIAGNOSTIC  "(GENERAL) N/A 4/16/2018    Procedure: LAPAROSCOPY DIAGNOSTIC (GENERAL);  Diagnostic laparoscopy and lysis of adhesions;  Surgeon: Prince Dowling MD;  [facility]: [facility] OR     OPTICAL TRACKING SYSTEM CRANIOTOMY, EXCISE TUMOR, COMBINED Left 4/10/2015    Procedure: COMBINED OPTICAL TRACKING SYSTEM CRANIOTOMY, EXCISE TUMOR;  Surgeon: Mirlande Colmenares MD;  [facility] OR     REPAIR GAMEKEEPER'S THUMB Right 12/2/2016    Procedure: REPAIR LIGAMENT ULNAR COLLATERAL THUMB (GAMEKEEPER'S);  Surgeon: Evin Zamorano MD;  Location:  OR     [facility] NONSPECIFIC PROCEDURE      right forearm fracture     alcohol swab prep pads  bictegravir-emtricitabine-tenofovir (BIKTARVY) -25 MG per tablet  blood glucose (NO BRAND SPECIFIED) lancets standard  blood glucose (NO BRAND SPECIFIED) test strip  blood glucose (NO BRAND SPECIFIED) test strip  blood glucose (NO BRAND SPECIFIED) test strip  blood glucose calibration (NO BRAND SPECIFIED) solution  blood glucose calibration (NO BRAND SPECIFIED) solution  blood glucose monitoring (NO BRAND SPECIFIED) meter device kit  blood glucose monitoring (NO BRAND SPECIFIED) meter device kit  blood glucose monitoring (NO BRAND SPECIFIED) meter device kit  famotidine (PEPCID) 20 MG tablet  insulin glargine (BASAGLAR KWIKPEN) 100 UNIT/ML pen  insulin glargine (LANTUS SOLOSTAR) 100 UNIT/ML pen  insulin pen needle (B-D U/F) 31G X 8 MM miscellaneous  insulin pen needle (BD SCOTT U/F) 32G X 4 MM miscellaneous  Lancets (ONETOUCH DELICA PLUS [id number]) MISC  NOVOLOG FLEXPEN 100 UNIT/ML soln  SENNA-docusate sodium (SENNA S) 8.6-50 MG tablet  STATIN NOT PRESCRIBED (INTENTIONAL)  thin (NO BRAND SPECIFIED) lancets  thin (NO BRAND SPECIFIED) lancets      Allergies   Allergen Reactions     Metformin      Abdominal pain     Milk [Lac Bovis] Hives     Tylenol [Acetaminophen] Itching     Dulaglutide Rash     Penicillin V Other (See Comments) and Rash     Diffuse maculopapular rash + feels \"high\", per pt.  " "    Family History  Family History   Problem Relation Age of Onset     Diabetes Brother      Diabetes Father      Alzheimer Disease Father      Unknown/Adopted Mother      Diabetes Paternal Grandfather      Cancer No family hx of         no skin cancer     Skin Cancer No family hx of         no famiy hx of skin cancer     Glaucoma No family hx of      Macular Degeneration No family hx of      Social History   Social History     Tobacco Use     Smoking status: Current Every Day Smoker     Packs/day: 0.50     Years: 40.00     Pack years: 20.00     Types: Cigarettes     Smokeless tobacco: Former User   Substance Use Topics     Alcohol use: No     Alcohol/week: 0.0 standard drinks     Drinks per session: Patient refused     Comment: Last etoh in 2007     Drug use: Not Currently     Types: Marijuana, Methamphetamines     Comment: Sober 9 months       Past medical history, past surgical history, medications, allergies, family history, and social history were reviewed with the patient. No additional pertinent items.       Review of Systems  {Complete vs limited ROS:[id number]::\"A complete review of systems was performed with pertinent positives and negatives noted in the HPI, and all other systems negative.\"}    Physical Exam   BP: (!) 154/84  Pulse: 110  Temp: 98.2  F (36.8  C)  Resp: 16  Height: 165.1 cm (5' 5\")  Weight: 79.4 kg (175 lb)  SpO2: 97 %  Physical Exam  ***  ED Course      Procedures        {ED Course Selections:794813}       No results found for any visits on 05/04/21.  Medications - No data to display     Assessments & Plan (with Medical Decision Making)   ***    I have reviewed the nursing notes. I have reviewed the findings, diagnosis, plan and need for follow up with the patient.    New Prescriptions    No medications on file       Final diagnoses:   None       --  ***  [facility] EMERGENCY DEPARTMENT  5/4/2021  "

## 2021-05-04 NOTE — ED TRIAGE NOTES
Triage Assessment & Note:    Patient presents with: PT reports a puncture wound to his left foot from glass, occurred last evening.     Home Treatments/Remedies: None    Febrile / Afebrile? Afebrile     Duration of C/o:  24hrs     Mick Barrientos RN  May 4, 2021

## 2021-05-13 ENCOUNTER — HOSPITAL ENCOUNTER (EMERGENCY)
Facility: CLINIC | Age: 56
Discharge: HOME | End: 2021-05-13
Payer: COMMERCIAL

## 2021-05-13 VITALS
DIASTOLIC BLOOD PRESSURE: 91 MMHG | OXYGEN SATURATION: 96 % | TEMPERATURE: 98 F | WEIGHT: 174.6 LBS | RESPIRATION RATE: 16 BRPM | HEIGHT: 65 IN | HEART RATE: 101 BPM | BODY MASS INDEX: 29.09 KG/M2 | SYSTOLIC BLOOD PRESSURE: 137 MMHG

## 2021-05-13 RX ORDER — ONDANSETRON 2 MG/ML
4 INJECTION INTRAMUSCULAR; INTRAVENOUS EVERY 30 MIN PRN
Status: DISCONTINUED | OUTPATIENT
Start: 2021-05-13 | End: 2021-05-13 | Stop reason: HOSPADM

## 2021-05-13 ASSESSMENT — MIFFLIN-ST. JEOR: SCORE: 1543.88

## 2021-05-13 NOTE — ED PROVIDER NOTES
[place] EMERGENCY DEPARTMENT ([facility])  5/13/21     History     Chief Complaint   Patient presents with     Abdominal Pain     HPI  Andrew Lockwood is a 55 year old male with a past medical history significant for homelessness, adynamic ileus with recurrent SBOs, appendectomy (01/2018), type 2 diabetes mellitus, CNS toxoplasma encephalitis, HLD, HIV/AIDS, and GERD who presents here to the Emergency Department due to abdominal pain and nausea.  ***    Past Medical History  Past Medical History:   Diagnosis Date     AIDS (H)      Allergic rhinitis due to other allergen     DNS     Anal dysplasia      Chronic abdominal pain      CNS toxoplasmosis (H)      Diabetes type 2, controlled (H)      GERD (gastroesophageal reflux disease)      History of seizure      History of substance abuse (H)      HIV (human immunodeficiency virus infection) (H)      HLD (hyperlipidemia)      Lung nodules      Periungual wart      PTSD (post-traumatic stress disorder)      Sleep apnea     doesn't use CPAP     Past Surgical History:   Procedure Laterality Date     ANOSCOPY N/A 9/9/2020    Procedure: Exam Under Anesthesia, ANOSCOPY, fulgeration of rectal fissures with Rectal Biopsies;  Surgeon: Thanh Lundberg MD;  [facility] OR     COLONOSCOPY Left 1/22/2016    Procedure: COMBINED COLONOSCOPY, SINGLE OR MULTIPLE BIOPSY/POLYPECTOMY BY BIOPSY;  Surgeon: Clark Saini MD;  Location: [facility] GI     HC EXPLORE UNDESC TESTIS,INGUIN/SCROTAL       LAPAROSCOPIC APPENDECTOMY N/A 1/31/2018    Procedure: LAPAROSCOPIC APPENDECTOMY;  LAPAROSCOPIC APPENDECTOMY;  Surgeon: Dawn Holt MD;  [facility]     LAPAROSCOPY DIAGNOSTIC (GENERAL) N/A 7/26/2016    Procedure: LAPAROSCOPY DIAGNOSTIC (GENERAL);  Surgeon: Susannah Arriaga MD;  [facility]     LAPAROSCOPY DIAGNOSTIC (GENERAL) N/A 4/16/2018    Procedure: LAPAROSCOPY DIAGNOSTIC (GENERAL);  Diagnostic laparoscopy and lysis of adhesions;  Surgeon: Prince Dowling  "MD Sky;  Location: [facility]     OPTICAL TRACKING SYSTEM CRANIOTOMY, EXCISE TUMOR, COMBINED Left 4/10/2015    Procedure: COMBINED OPTICAL TRACKING SYSTEM CRANIOTOMY, EXCISE TUMOR;  Surgeon: Mirlande Colmenares MD;  [facility]     REPAIR GAMEKEEPER'S THUMB Right 12/2/2016    Procedure: REPAIR LIGAMENT ULNAR COLLATERAL THUMB (GAMEKEEPER'S);  Surgeon: Evin Zamorano MD;  Location:  OR     RUST NONSPECIFIC PROCEDURE      right forearm fracture     alcohol swab prep pads  bictegravir-emtricitabine-tenofovir (BIKTARVY) -25 MG per tablet  blood glucose (NO BRAND SPECIFIED) lancets standard  blood glucose (NO BRAND SPECIFIED) test strip  blood glucose (NO BRAND SPECIFIED) test strip  blood glucose (NO BRAND SPECIFIED) test strip  blood glucose calibration (NO BRAND SPECIFIED) solution  blood glucose calibration (NO BRAND SPECIFIED) solution  blood glucose monitoring (NO BRAND SPECIFIED) meter device kit  blood glucose monitoring (NO BRAND SPECIFIED) meter device kit  blood glucose monitoring (NO BRAND SPECIFIED) meter device kit  famotidine (PEPCID) 20 MG tablet  insulin glargine (BASAGLAR KWIKPEN) 100 UNIT/ML pen  insulin glargine (LANTUS SOLOSTAR) 100 UNIT/ML pen  insulin pen needle (B-D U/F) 31G X 8 MM miscellaneous  insulin pen needle (BD SCOTT U/F) 32G X 4 MM miscellaneous  Lancets (ONETOUCH DELICA PLUS [id number]) MISC  NOVOLOG FLEXPEN 100 UNIT/ML soln  SENNA-docusate sodium (SENNA S) 8.6-50 MG tablet  STATIN NOT PRESCRIBED (INTENTIONAL)  thin (NO BRAND SPECIFIED) lancets  thin (NO BRAND SPECIFIED) lancets      Allergies   Allergen Reactions     Metformin      Abdominal pain     Milk [Lac Bovis] Hives     Tylenol [Acetaminophen] Itching     Dulaglutide Rash     Penicillin V Other (See Comments) and Rash     Diffuse maculopapular rash + feels \"high\", per pt.      Family History  Family History   Problem Relation Age of Onset     Diabetes Brother      Diabetes Father      Alzheimer Disease Father      " "Unknown/Adopted Mother      Diabetes Paternal Grandfather      Cancer No family hx of         no skin cancer     Skin Cancer No family hx of         no famiy hx of skin cancer     Glaucoma No family hx of      Macular Degeneration No family hx of      Social History   Social History     Tobacco Use     Smoking status: Current Every Day Smoker     Packs/day: 0.50     Years: 40.00     Pack years: 20.00     Types: Cigarettes     Smokeless tobacco: Former User   Substance Use Topics     Alcohol use: No     Alcohol/week: 0.0 standard drinks     Drinks per session: Patient refused     Comment: Last etoh in 2007     Drug use: Not Currently     Types: Marijuana, Methamphetamines     Comment: Sober 9 months       Past medical history, past surgical history, medications, allergies, family history, and social history were reviewed with the patient. No additional pertinent items.       Review of Systems  A complete review of systems was performed with pertinent positives and negatives noted in the HPI, and all other systems negative.    Physical Exam   BP: (!) 137/91  Pulse: 101  Temp: 98  F (36.7  C)  Resp: 16  Height: 165.1 cm (5' 5\")  Weight: 79.2 kg (174 lb 9.7 oz)  SpO2: 96 %  Physical Exam  ***  ED Course      Procedures        {ED Course Selections:049274}       No results found for any visits on 05/13/21.  Medications - No data to display     Assessments & Plan (with Medical Decision Making)   ***    I have reviewed the nursing notes. I have reviewed the findings, diagnosis, plan and need for follow up with the patient.    New Prescriptions    No medications on file       Final diagnoses:   None       --  ***  [facility] EMERGENCY DEPARTMENT  5/13/2021  "

## 2021-05-25 ENCOUNTER — TELEPHONE (OUTPATIENT)
Dept: ENDOCRINOLOGY | Facility: CLINIC | Age: 56
End: 2021-05-25

## 2021-05-25 NOTE — TELEPHONE ENCOUNTER
[facility] Call Center    Phone Message    May a detailed message be left on voicemail: yes     Reason for Call: Other: . Per Radha is wanting to get a call back to know if Karen De Leon is following patients care as patient has been in the ER over 20 times this year and patient hospitalizations 4 times for varies reasons. Radha is wanting to know if Fady De Leon is following up with patient on care,  please advise.     Radha is trying to make sure someone is helping patient out.     Action Taken: Message routed to:  [facility] ([facility] ENDO    Travel Screening: Not Applicable

## 2021-05-25 NOTE — TELEPHONE ENCOUNTER
Followed up with Radha and informed her pt was last seen in clinic in August 2020 and no follow up visit scheduled. 5/25/21

## 2021-06-04 ENCOUNTER — TELEPHONE (OUTPATIENT)
Dept: ENDOCRINOLOGY | Facility: CLINIC | Age: 56
End: 2021-06-04

## 2021-06-11 ENCOUNTER — TELEPHONE (OUTPATIENT)
Dept: PHARMACY | Facility: CLINIC | Age: 56
End: 2021-06-11

## 2021-06-11 NOTE — TELEPHONE ENCOUNTER
Called pt to let him know Lantus, pen needles and Biktarvy were ready for  at the [facility] pharmacy.     Lenka Mabry CPhT  [facility]  [phone]

## 2021-06-14 ENCOUNTER — TELEPHONE (OUTPATIENT)
Dept: PHARMACY | Facility: CLINIC | Age: 56
End: 2021-06-14

## 2021-06-14 NOTE — TELEPHONE ENCOUNTER
I called and left pt a message letting him know his medication was ready for  at the [facility] on [address].         Lenka Mabry CPhT  [facility]  [phone]

## 2021-06-15 DIAGNOSIS — Z21 HIV INFECTION, UNSPECIFIED SYMPTOM STATUS (H): ICD-10-CM

## 2021-06-15 DIAGNOSIS — Z11.3 ROUTINE SCREENING FOR STI (SEXUALLY TRANSMITTED INFECTION): ICD-10-CM

## 2021-06-15 LAB
ALBUMIN SERPL-MCNC: 3.8 G/DL (ref 3.4–5)
ALP SERPL-CCNC: 120 U/L (ref 40–150)
ALT SERPL W P-5'-P-CCNC: 23 U/L (ref 0–70)
ANION GAP SERPL CALCULATED.3IONS-SCNC: 7 MMOL/L (ref 3–14)
AST SERPL W P-5'-P-CCNC: 17 U/L (ref 0–45)
BASOPHILS # BLD AUTO: 0.1 10E9/L (ref 0–0.2)
BASOPHILS NFR BLD AUTO: 0.8 %
BILIRUB SERPL-MCNC: 0.5 MG/DL (ref 0.2–1.3)
BUN SERPL-MCNC: 22 MG/DL (ref 7–30)
CALCIUM SERPL-MCNC: 8.9 MG/DL (ref 8.5–10.1)
CD3 CELLS # BLD: 1477 CELLS/UL (ref 603–2990)
CD3 CELLS NFR BLD: 59 % (ref 49–84)
CD3+CD4+ CELLS # BLD: 540 CELLS/UL (ref 441–2156)
CD3+CD4+ CELLS NFR BLD: 21 % (ref 28–63)
CD3+CD4+ CELLS/CD3+CD8+ CLL BLD: 0.58 % (ref 1.4–2.6)
CD3+CD8+ CELLS # BLD: 909 CELLS/UL (ref 125–1312)
CD3+CD8+ CELLS NFR BLD: 36 % (ref 10–40)
CHLORIDE SERPL-SCNC: 104 MMOL/L (ref 94–109)
CO2 SERPL-SCNC: 26 MMOL/L (ref 20–32)
CREAT SERPL-MCNC: 0.79 MG/DL (ref 0.66–1.25)
DIFFERENTIAL METHOD BLD: NORMAL
EOSINOPHIL # BLD AUTO: 0.2 10E9/L (ref 0–0.7)
EOSINOPHIL NFR BLD AUTO: 2.9 %
ERYTHROCYTE [DISTWIDTH] IN BLOOD BY AUTOMATED COUNT: 12.2 % (ref 10–15)
GFR SERPL CREATININE-BSD FRML MDRD: >90 ML/MIN/{1.73_M2}
GLUCOSE SERPL-MCNC: 245 MG/DL (ref 70–99)
HCT VFR BLD AUTO: 41.7 % (ref 40–53)
HGB BLD-MCNC: 14.3 G/DL (ref 13.3–17.7)
IFC SPECIMEN: ABNORMAL
IMM GRANULOCYTES # BLD: 0.1 10E9/L (ref 0–0.4)
IMM GRANULOCYTES NFR BLD: 0.7 %
LYMPHOCYTES # BLD AUTO: 2.5 10E9/L (ref 0.8–5.3)
LYMPHOCYTES NFR BLD AUTO: 33.9 %
MCH RBC QN AUTO: 29.7 PG (ref 26.5–33)
MCHC RBC AUTO-ENTMCNC: 34.3 G/DL (ref 31.5–36.5)
MCV RBC AUTO: 87 FL (ref 78–100)
MONOCYTES # BLD AUTO: 0.5 10E9/L (ref 0–1.3)
MONOCYTES NFR BLD AUTO: 6.3 %
NEUTROPHILS # BLD AUTO: 4.1 10E9/L (ref 1.6–8.3)
NEUTROPHILS NFR BLD AUTO: 55.4 %
NRBC # BLD AUTO: 0 10*3/UL
NRBC BLD AUTO-RTO: 0 /100
PLATELET # BLD AUTO: 355 10E9/L (ref 150–450)
POTASSIUM SERPL-SCNC: 4 MMOL/L (ref 3.4–5.3)
PROT SERPL-MCNC: 7.9 G/DL (ref 6.8–8.8)
RBC # BLD AUTO: 4.81 10E12/L (ref 4.4–5.9)
SODIUM SERPL-SCNC: 136 MMOL/L (ref 133–144)
T PALLIDUM AB SER QL: NONREACTIVE
WBC # BLD AUTO: 7.4 10E9/L (ref 4–11)

## 2021-06-15 PROCEDURE — 86780 TREPONEMA PALLIDUM: CPT | Mod: 90 | Performed by: PATHOLOGY

## 2021-06-15 PROCEDURE — 86359 T CELLS TOTAL COUNT: CPT | Mod: 90 | Performed by: PATHOLOGY

## 2021-06-15 PROCEDURE — 80053 COMPREHEN METABOLIC PANEL: CPT | Performed by: PATHOLOGY

## 2021-06-15 PROCEDURE — 86360 T CELL ABSOLUTE COUNT/RATIO: CPT | Mod: 90 | Performed by: PATHOLOGY

## 2021-06-15 PROCEDURE — 87536 HIV-1 QUANT&REVRSE TRNSCRPJ: CPT | Mod: 90 | Performed by: PATHOLOGY

## 2021-06-15 PROCEDURE — 85025 COMPLETE CBC W/AUTO DIFF WBC: CPT | Performed by: PATHOLOGY

## 2021-06-15 PROCEDURE — 87491 CHLMYD TRACH DNA AMP PROBE: CPT | Mod: 90 | Performed by: PATHOLOGY

## 2021-06-15 PROCEDURE — 99000 SPECIMEN HANDLING OFFICE-LAB: CPT | Performed by: PATHOLOGY

## 2021-06-15 PROCEDURE — 87591 N.GONORRHOEAE DNA AMP PROB: CPT | Mod: 90 | Performed by: PATHOLOGY

## 2021-06-15 PROCEDURE — 36415 COLL VENOUS BLD VENIPUNCTURE: CPT | Performed by: PATHOLOGY

## 2021-06-17 DIAGNOSIS — K21.9 GASTROESOPHAGEAL REFLUX DISEASE WITHOUT ESOPHAGITIS: ICD-10-CM

## 2021-06-17 RX ORDER — FAMOTIDINE 20 MG/1
20 TABLET, FILM COATED ORAL 2 TIMES DAILY
Qty: 30 TABLET | Refills: 11 | Status: CANCELLED | OUTPATIENT
Start: 2021-06-17

## 2021-06-24 ENCOUNTER — HOSPITAL ENCOUNTER (EMERGENCY)
Facility: CLINIC | Age: 56
Discharge: HOME | End: 2021-06-24
Attending: EMERGENCY MEDICINE | Admitting: EMERGENCY MEDICINE
Payer: COMMERCIAL

## 2021-06-24 VITALS
WEIGHT: 165 LBS | HEART RATE: 91 BPM | SYSTOLIC BLOOD PRESSURE: 124 MMHG | HEIGHT: 64 IN | TEMPERATURE: 98.9 F | RESPIRATION RATE: 18 BRPM | BODY MASS INDEX: 28.17 KG/M2 | OXYGEN SATURATION: 100 % | DIASTOLIC BLOOD PRESSURE: 77 MMHG

## 2021-06-24 DIAGNOSIS — E87.6 HYPOKALEMIA: ICD-10-CM

## 2021-06-24 DIAGNOSIS — R25.2 CRAMP IN MUSCLE: ICD-10-CM

## 2021-06-24 LAB
ALBUMIN SERPL-MCNC: 2.9 G/DL (ref 3.4–5)
ALBUMIN UR-MCNC: 30 MG/DL
ALP SERPL-CCNC: 98 U/L (ref 40–150)
ALT SERPL W P-5'-P-CCNC: 20 U/L (ref 0–70)
ANION GAP SERPL CALCULATED.3IONS-SCNC: 9 MMOL/L (ref 3–14)
APPEARANCE UR: CLEAR
AST SERPL W P-5'-P-CCNC: 12 U/L (ref 0–45)
BASOPHILS # BLD AUTO: 0.1 10E9/L (ref 0–0.2)
BASOPHILS NFR BLD AUTO: 0.6 %
BILIRUB SERPL-MCNC: 0.4 MG/DL (ref 0.2–1.3)
BILIRUB UR QL STRIP: NEGATIVE
BUN SERPL-MCNC: 18 MG/DL (ref 7–30)
CALCIUM SERPL-MCNC: 7.3 MG/DL (ref 8.5–10.1)
CHLORIDE SERPL-SCNC: 107 MMOL/L (ref 94–109)
CK SERPL-CCNC: 118 U/L (ref 30–300)
CO2 BLDCOV-SCNC: 24 MMOL/L (ref 21–28)
CO2 SERPL-SCNC: 20 MMOL/L (ref 20–32)
COLOR UR AUTO: ABNORMAL
CREAT SERPL-MCNC: 0.81 MG/DL (ref 0.66–1.25)
DIFFERENTIAL METHOD BLD: ABNORMAL
EOSINOPHIL # BLD AUTO: 0.1 10E9/L (ref 0–0.7)
EOSINOPHIL NFR BLD AUTO: 1.4 %
ERYTHROCYTE [DISTWIDTH] IN BLOOD BY AUTOMATED COUNT: 12.4 % (ref 10–15)
GFR SERPL CREATININE-BSD FRML MDRD: >90 ML/MIN/{1.73_M2}
GLUCOSE SERPL-MCNC: 220 MG/DL (ref 70–99)
GLUCOSE UR STRIP-MCNC: >1000 MG/DL
HCT VFR BLD AUTO: 39.5 % (ref 40–53)
HGB BLD-MCNC: 13.5 G/DL (ref 13.3–17.7)
HGB UR QL STRIP: NEGATIVE
IMM GRANULOCYTES # BLD: 0.1 10E9/L (ref 0–0.4)
IMM GRANULOCYTES NFR BLD: 1.1 %
KETONES UR STRIP-MCNC: NEGATIVE MG/DL
LACTATE BLD-SCNC: 1.6 MMOL/L (ref 0.7–2.1)
LEUKOCYTE ESTERASE UR QL STRIP: NEGATIVE
LYMPHOCYTES # BLD AUTO: 2.8 10E9/L (ref 0.8–5.3)
LYMPHOCYTES NFR BLD AUTO: 29.4 %
MAGNESIUM SERPL-MCNC: 1.7 MG/DL (ref 1.6–2.3)
MCH RBC QN AUTO: 29.9 PG (ref 26.5–33)
MCHC RBC AUTO-ENTMCNC: 34.2 G/DL (ref 31.5–36.5)
MCV RBC AUTO: 87 FL (ref 78–100)
MONOCYTES # BLD AUTO: 0.9 10E9/L (ref 0–1.3)
MONOCYTES NFR BLD AUTO: 9 %
MUCOUS THREADS #/AREA URNS LPF: PRESENT /LPF
NEUTROPHILS # BLD AUTO: 5.6 10E9/L (ref 1.6–8.3)
NEUTROPHILS NFR BLD AUTO: 58.5 %
NITRATE UR QL: NEGATIVE
NRBC # BLD AUTO: 0 10*3/UL
NRBC BLD AUTO-RTO: 0 /100
PCO2 BLDV: 40 MM HG (ref 40–50)
PH BLDV: 7.4 PH (ref 7.32–7.43)
PH UR STRIP: 5.5 PH (ref 5–7)
PHOSPHATE SERPL-MCNC: 2.6 MG/DL (ref 2.5–4.5)
PLATELET # BLD AUTO: 386 10E9/L (ref 150–450)
PLATELET # BLD EST: ABNORMAL 10*3/UL
PO2 BLDV: 64 MM HG (ref 25–47)
POTASSIUM SERPL-SCNC: 3.2 MMOL/L (ref 3.4–5.3)
PROT SERPL-MCNC: 5.9 G/DL (ref 6.8–8.8)
RBC # BLD AUTO: 4.52 10E12/L (ref 4.4–5.9)
RBC #/AREA URNS AUTO: 1 /HPF (ref 0–2)
SAO2 % BLDV FROM PO2: 92 %
SODIUM SERPL-SCNC: 136 MMOL/L (ref 133–144)
SOURCE: ABNORMAL
SP GR UR STRIP: 1.03 (ref 1–1.03)
SQUAMOUS #/AREA URNS AUTO: 0 /HPF (ref 0–1)
TRANS CELLS #/AREA URNS HPF: <1 /HPF (ref 0–1)
TROPONIN I SERPL-MCNC: <0.015 UG/L (ref 0–0.04)
UROBILINOGEN UR STRIP-MCNC: NORMAL MG/DL (ref 0–2)
WBC # BLD AUTO: 9.6 10E9/L (ref 4–11)
WBC #/AREA URNS AUTO: 1 /HPF (ref 0–5)

## 2021-06-24 PROCEDURE — 96360 HYDRATION IV INFUSION INIT: CPT | Performed by: EMERGENCY MEDICINE

## 2021-06-24 PROCEDURE — 85025 COMPLETE CBC W/AUTO DIFF WBC: CPT | Performed by: EMERGENCY MEDICINE

## 2021-06-24 PROCEDURE — 82803 BLOOD GASES ANY COMBINATION: CPT

## 2021-06-24 PROCEDURE — 82550 ASSAY OF CK (CPK): CPT | Performed by: EMERGENCY MEDICINE

## 2021-06-24 PROCEDURE — 83605 ASSAY OF LACTIC ACID: CPT

## 2021-06-24 PROCEDURE — 84100 ASSAY OF PHOSPHORUS: CPT | Performed by: EMERGENCY MEDICINE

## 2021-06-24 PROCEDURE — 84484 ASSAY OF TROPONIN QUANT: CPT | Performed by: EMERGENCY MEDICINE

## 2021-06-24 PROCEDURE — 81001 URINALYSIS AUTO W/SCOPE: CPT | Performed by: EMERGENCY MEDICINE

## 2021-06-24 PROCEDURE — 258N000003 HC RX IP 258 OP 636: Performed by: EMERGENCY MEDICINE

## 2021-06-24 PROCEDURE — 93005 ELECTROCARDIOGRAM TRACING: CPT | Performed by: EMERGENCY MEDICINE

## 2021-06-24 PROCEDURE — 99284 EMERGENCY DEPT VISIT MOD MDM: CPT | Mod: 25 | Performed by: EMERGENCY MEDICINE

## 2021-06-24 PROCEDURE — 83735 ASSAY OF MAGNESIUM: CPT | Performed by: EMERGENCY MEDICINE

## 2021-06-24 PROCEDURE — 96361 HYDRATE IV INFUSION ADD-ON: CPT | Performed by: EMERGENCY MEDICINE

## 2021-06-24 PROCEDURE — 80053 COMPREHEN METABOLIC PANEL: CPT | Performed by: EMERGENCY MEDICINE

## 2021-06-24 PROCEDURE — 99282 EMERGENCY DEPT VISIT SF MDM: CPT | Performed by: EMERGENCY MEDICINE

## 2021-06-24 RX ORDER — SODIUM CHLORIDE 9 MG/ML
INJECTION, SOLUTION INTRAVENOUS CONTINUOUS
Status: DISCONTINUED | OUTPATIENT
Start: 2021-06-24 | End: 2021-06-25 | Stop reason: HOSPADM

## 2021-06-24 RX ORDER — POTASSIUM CHLORIDE 20MEQ/15ML
40 LIQUID (ML) ORAL ONCE
Status: DISCONTINUED | OUTPATIENT
Start: 2021-06-24 | End: 2021-06-25 | Stop reason: HOSPADM

## 2021-06-24 RX ADMIN — SODIUM CHLORIDE 1000 ML: 900 INJECTION, SOLUTION INTRAVENOUS at 19:33

## 2021-06-24 ASSESSMENT — MIFFLIN-ST. JEOR: SCORE: 1484.44

## 2021-06-25 LAB — INTERPRETATION ECG - MUSE: NORMAL

## 2021-06-25 NOTE — DISCHARGE INSTRUCTIONS
TODAY'S VISIT:  - You should discuss all imaging/radiology tests and laboratory tests that were performed during this visit with your usual providers to ensure you continue to improve and do not need any further evaluation, testing or management.   - Please call your Primary Care team to discuss and arrange a follow-up appointment.   - Return to the nearest Emergency Department immediately with any new or worsening symptoms or any concerns.  You are welcome here at any time.    FOLLOW-UP:  Please make an appointment to follow up with:  - Your Primary Care Provider as soon as possible.  - If you do not have a primary care provider, you can be seen in follow-up and establish care with one of our providers by calling of the the clinics below:  --- [facility] (phone: [phone])  --- [facility] (phone: [phone])   - Have your provider review the results from today's visit with you again to make sure no further follow-up or additional testing is needed based on those results.     OTHER INSTRUCTIONS:  - Do your best to stay hydrated.    RETURN TO THE EMERGENCY DEPARTMENT  Return to the Emergency Department immediately for any new or worsening symptoms or any concerns.     Remember that you can always come back to the Emergency Department if you are not able to see your regular doctor in the amount of time listed above, if you get any new symptoms, or if there is anything that worries you.

## 2021-06-25 NOTE — ED TRIAGE NOTES
Pt c/o of dehydration and muscle cramping this evening. Pt states that he has been outside today with out drinking much water.

## 2021-06-25 NOTE — ED PROVIDER NOTES
ED Provider Note  [facility]      History   No chief complaint on file.    HPI  Andrew Lockwood is a 55 year old male with a PMH of HIV (Most recnet CD4 count 540), CNS toxoplasmosis, toxoplasma encephalitis, brain lesion and hx of seizures, DM2, HLD, GERD, Prior pneumonia, SBO, adynamic ileus, abdominal abscess, S/P appendectomy, DAVID and MDD, et al., who presents to the ED today reporting concern for dehydration and muscle cramping.     Patient reports he has been out in the sun and heat today and was unable to drink much water or fluids. He then felt like he has historically when he's been dehydrated, and experienced some muscle cramping discomfort (both legs, both buttock muscle areas, both hands). All similar to when he's had muscle cramps in the past. No HAs, eye or ear symptoms. No neck or back symptoms. No CP or breathing symptoms. No abdominal pain, nausea or vomiting. Maybe urinating just a bit less, but otherwise no GI or  symptoms. No rashes or skin changes. No joint swelling. No fevers or chills. No traumas or falls. No other triggers for symptoms that he's aware of. No recent illness or ill exposures. HIV doing well w/ last CD4 count 540. No other symptoms or complaints at this time. Please see ROS for further details.    IVF were started for patient in triage, and he says actually now the symptoms are all completely resolved and he is back to his normal baseline and would like to be/is ready for discharge. He has no other concerns at this time.     Past Medical History  Past Medical History:   Diagnosis Date     AIDS (H)      Allergic rhinitis due to other allergen     DNS     Anal dysplasia      Chronic abdominal pain      CNS toxoplasmosis (H)      Diabetes type 2, controlled (H)      GERD (gastroesophageal reflux disease)      History of seizure      History of substance abuse (H)      HIV (human immunodeficiency virus infection) (H)      HLD (hyperlipidemia)      Lung  nodules      Periungual wart      PTSD (post-traumatic stress disorder)      Sleep apnea     doesn't use CPAP     Past Surgical History:   Procedure Laterality Date     ANOSCOPY N/A 9/9/2020    Procedure: Exam Under Anesthesia, ANOSCOPY, fulgeration of rectal fissures with Rectal Biopsies;  Surgeon: Thanh Lundberg MD;  [facility]     COLONOSCOPY Left 1/22/2016    Procedure: COMBINED COLONOSCOPY, SINGLE OR MULTIPLE BIOPSY/POLYPECTOMY BY BIOPSY;  Surgeon: Clark Saini MD;  [facility]     HC EXPLORE UNDESC TESTIS,INGUIN/SCROTAL       LAPAROSCOPIC APPENDECTOMY N/A 1/31/2018    Procedure: LAPAROSCOPIC APPENDECTOMY;  LAPAROSCOPIC APPENDECTOMY;  Surgeon: Dawn Holt MD;  [facility]     LAPAROSCOPY DIAGNOSTIC (GENERAL) N/A 7/26/2016    Procedure: LAPAROSCOPY DIAGNOSTIC (GENERAL);  Surgeon: Susannah Arriaga MD;  [facility]     LAPAROSCOPY DIAGNOSTIC (GENERAL) N/A 4/16/2018    Procedure: LAPAROSCOPY DIAGNOSTIC (GENERAL);  Diagnostic laparoscopy and lysis of adhesions;  Surgeon: Prince Dowling MD;  [facility]     OPTICAL TRACKING SYSTEM CRANIOTOMY, EXCISE TUMOR, COMBINED Left 4/10/2015    Procedure: COMBINED OPTICAL TRACKING SYSTEM CRANIOTOMY, EXCISE TUMOR;  Surgeon: Mirlande Colmenares MD;  [facility]     REPAIR GAMEKEEPER'S THUMB Right 12/2/2016    Procedure: REPAIR LIGAMENT ULNAR COLLATERAL THUMB (GAMEKEEPER'S);  Surgeon: Evin Zamorano MD;  [facility] OR     ZZC NONSPECIFIC PROCEDURE      right forearm fracture     alcohol swab prep pads  bictegravir-emtricitabine-tenofovir (BIKTARVY) -25 MG per tablet  blood glucose (NO BRAND SPECIFIED) lancets standard  blood glucose (NO BRAND SPECIFIED) test strip  blood glucose (NO BRAND SPECIFIED) test strip  blood glucose (NO BRAND SPECIFIED) test strip  blood glucose calibration (NO BRAND SPECIFIED) solution  blood glucose calibration (NO BRAND SPECIFIED) solution  blood glucose monitoring (NO BRAND  "SPECIFIED) meter device kit  blood glucose monitoring (NO BRAND SPECIFIED) meter device kit  blood glucose monitoring (NO BRAND SPECIFIED) meter device kit  famotidine (PEPCID) 20 MG tablet  insulin glargine (BASAGLAR KWIKPEN) 100 UNIT/ML pen  insulin glargine (LANTUS SOLOSTAR) 100 UNIT/ML pen  insulin pen needle (B-D U/F) 31G X 8 MM miscellaneous  insulin pen needle (BD SCOTT U/F) 32G X 4 MM miscellaneous  Lancets (ONETOUCH DELICA PLUS [id number]) MISC  NOVOLOG FLEXPEN 100 UNIT/ML soln  SENNA-docusate sodium (SENNA S) 8.6-50 MG tablet  STATIN NOT PRESCRIBED (INTENTIONAL)  thin (NO BRAND SPECIFIED) lancets  thin (NO BRAND SPECIFIED) lancets      Allergies   Allergen Reactions     Metformin      Abdominal pain     Milk [Lac Bovis] Hives     Tylenol [Acetaminophen] Itching     Dulaglutide Rash     Penicillin V Other (See Comments) and Rash     Diffuse maculopapular rash + feels \"high\", per pt.      Family History  Family History   Problem Relation Age of Onset     Diabetes Brother      Diabetes Father      Alzheimer Disease Father      Unknown/Adopted Mother      Diabetes Paternal Grandfather      Cancer No family hx of         no skin cancer     Skin Cancer No family hx of         no famiy hx of skin cancer     Glaucoma No family hx of      Macular Degeneration No family hx of      Social History   Social History     Tobacco Use     Smoking status: Current Every Day Smoker     Packs/day: 0.50     Years: 40.00     Pack years: 20.00     Types: Cigarettes     Smokeless tobacco: Former User   Substance Use Topics     Alcohol use: No     Alcohol/week: 0.0 standard drinks     Drinks per session: Patient refused     Comment: Last etoh in 2007     Drug use: Not Currently     Types: Marijuana, Methamphetamines     Comment: Sober 9 months       Past medical history, past surgical history, medications, allergies, family history, and social history were reviewed with the patient. No additional pertinent items.       Review of " Systems  A complete review of systems was performed with pertinent positives and negatives noted in the HPI, and all other systems negative.    Physical Exam      Physical Exam  CONSTITUTIONAL: Well-developed and well-nourished. Awake and alert. Non-toxic appearance. No acute distress.   HENT:   - Head: Normocephalic and atraumatic.   - Ears: Hearing and external ear grossly normal.   - Nose: Nose normal. No rhinorrhea. No epistaxis.   - Mouth/Throat: Perhaps just mildly dry, could be WNL  EYES: Conjunctivae and lids are normal. No scleral icterus.   NECK: Normal range of motion and phonation normal. Neck supple.  No tracheal deviation, no stridor. No edema or erythema noted. No stiffness/rigidity.   CARDIOVASCULAR: Normal rate, regular rhythm and no appreciable abnormal heart sounds.  PULMONARY/CHEST: Normal work of breathing. No accessory muscle usage or stridor. No respiratory distress.  No appreciable abnormal breath sounds.  MUSCULOSKELETAL: Extremities warm and seemingly well perfused. No edema or calf tenderness. Normal strength/sensation. No acute skin findings. Compartments soft, normal appearing joints w/o apparent effusions. Normal ROM and able to bear weight well/ambulate w/o issue.   NEUROLOGIC: Awake, alert. Not disoriented. He displays no atrophy and no tremor. Normal tone. No seizure activity. GCS 15. Strength/sensation intact w/o abnormalities or focal findings appreciated.   SKIN: Skin is warm and dry. No rash noted. No diaphoresis. No pallor.   PSYCHIATRIC: Normal mood and affect. Speech and behavior normal. Thought processes linear. Cognition and memory are normal.     Assessments & Plan (with Medical Decision Making)   IMPRESSION: Very pleasant 55 year old male w/ PMH of HIV/AIDS, CNS toxoplasmosis, toxoplasma encephalitis, brain lesion and hx of seizures, DM2, HLD, GERD, Prior pneumonia, SBO, adynamic ileus, abdominal abscess, S/P appendectomy, DAVID and MDD, et al., who presents to the ED today  reporting concern for dehydration and muscle cramping, as described above.     Clinically, patient appears nontoxic NAD. Vitals WNL. Otherwise on exam, no acute findings. No apparent muscle/joint or MSK findings currently. No effusions, normal compartments. No apparent neurovascular deficits. No findings for acute inflammatory or infectious arthritis, no findings for SSTIs, Whole of exam is benign and patients symptoms have resolved.     PLAN: Labs obtained in triage for symptoms, pending. Patient symptoms have resolved with the IVFs he received here and he is already requesting discharge, but is willing to await his pending lab results.   --- Happy he is feeling better, and dehydration/muscle cramps may fit with symptoms, but exact cause not yet known, and so provided strict return/safety precautions and he will karina PCP in the morning to arrange follow-up to ensure he continues to improve.     RESULTS:  - Labs: Mild hypokalemia  - Urine: No acute findings    INTERVENTIONS:   - IVF  - PO KCl ordered for K replacement    RE-EVALUATION:  - The patient's symptoms were much improved and have now resolved after the IVF here in the ED today.   - Pt otherwise continues to do well here in the ED, no acute issues or apparent concerning changes in vitals or clinical appearance.    DISCUSSIONS:  - w/ Patient: I have reviewed the available findings, plan, need for close follow up, strict return/safety instructions with the patient. He expressed understanding and agreement with this plan. All questions answered to the best of our ability at this time.     DISPOSITION/PLANNING:  - IMPRESSION: Reported dehydration, muscle cramps, resolved  - DISPOSITION:Discharge  - FOLLOW-UP: PCP as soon as can be arranged  - RECOMMENDATIONS: Conservative symptom management, strict return instructions    Patient reportedly left the ED w/o his discharge paperwork, but I had verbally discussed all pertinent info with him and he has his usual  provider contact and agreed to arrange follow-up. He was awake, alert, no findings for clinical intoxication, was able to communicate clearly, ask appropriate questions and express understanding. Appears to have decision making capacity at this time.   ______________________________________________________________________        --  Ceci Trammell MD  [facility] EMERGENCY DEPARTMENT  6/24/2021     Ceci Trammell MD  06/26/21 0425

## 2021-07-13 ENCOUNTER — APPOINTMENT (OUTPATIENT)
Dept: CT IMAGING | Facility: CLINIC | Age: 56
End: 2021-07-13
Attending: EMERGENCY MEDICINE
Payer: COMMERCIAL

## 2021-07-13 ENCOUNTER — HOSPITAL ENCOUNTER (INPATIENT)
Facility: CLINIC | Age: 56
LOS: 4 days | Discharge: HOME | End: 2021-07-17
Attending: EMERGENCY MEDICINE | Admitting: INTERNAL MEDICINE
Payer: COMMERCIAL

## 2021-07-13 DIAGNOSIS — Z79.4 TYPE 2 DIABETES MELLITUS WITH OTHER SPECIFIED COMPLICATION, WITH LONG-TERM CURRENT USE OF INSULIN (H): ICD-10-CM

## 2021-07-13 DIAGNOSIS — E11.65 TYPE 2 DIABETES MELLITUS WITH HYPERGLYCEMIA, WITHOUT LONG-TERM CURRENT USE OF INSULIN (H): ICD-10-CM

## 2021-07-13 DIAGNOSIS — E11.8 TYPE 2 DIABETES MELLITUS WITH COMPLICATION, WITHOUT LONG-TERM CURRENT USE OF INSULIN (H): ICD-10-CM

## 2021-07-13 DIAGNOSIS — B20 HUMAN IMMUNODEFICIENCY VIRUS (HIV) DISEASE (H): ICD-10-CM

## 2021-07-13 DIAGNOSIS — E11.65 TYPE 2 DIABETES MELLITUS WITH HYPERGLYCEMIA, WITH LONG-TERM CURRENT USE OF INSULIN (H): ICD-10-CM

## 2021-07-13 DIAGNOSIS — K56.609 SMALL BOWEL OBSTRUCTION (H): ICD-10-CM

## 2021-07-13 DIAGNOSIS — Z79.4 TYPE 2 DIABETES MELLITUS WITH HYPERGLYCEMIA, WITH LONG-TERM CURRENT USE OF INSULIN (H): ICD-10-CM

## 2021-07-13 DIAGNOSIS — E11.9 TYPE 2 DIABETES MELLITUS WITHOUT COMPLICATION, WITHOUT LONG-TERM CURRENT USE OF INSULIN (H): ICD-10-CM

## 2021-07-13 DIAGNOSIS — E11.69 TYPE 2 DIABETES MELLITUS WITH OTHER SPECIFIED COMPLICATION, WITH LONG-TERM CURRENT USE OF INSULIN (H): ICD-10-CM

## 2021-07-13 DIAGNOSIS — Z20.822 COVID-19 RULED OUT BY LABORATORY TESTING: ICD-10-CM

## 2021-07-13 LAB
ALBUMIN SERPL-MCNC: 3.9 G/DL (ref 3.4–5)
ALP SERPL-CCNC: 129 U/L (ref 40–150)
ALT SERPL W P-5'-P-CCNC: 24 U/L (ref 0–70)
ANION GAP SERPL CALCULATED.3IONS-SCNC: 7 MMOL/L (ref 3–14)
AST SERPL W P-5'-P-CCNC: 18 U/L (ref 0–45)
BASOPHILS # BLD AUTO: 0.1 10E3/UL (ref 0–0.2)
BASOPHILS NFR BLD AUTO: 1 %
BILIRUB SERPL-MCNC: 1.4 MG/DL (ref 0.2–1.3)
BUN SERPL-MCNC: 17 MG/DL (ref 7–30)
CALCIUM SERPL-MCNC: 9.2 MG/DL (ref 8.5–10.1)
CHLORIDE BLD-SCNC: 103 MMOL/L (ref 94–109)
CO2 SERPL-SCNC: 26 MMOL/L (ref 20–32)
CREAT SERPL-MCNC: 0.91 MG/DL (ref 0.66–1.25)
EOSINOPHIL # BLD AUTO: 0.1 10E3/UL (ref 0–0.7)
EOSINOPHIL NFR BLD AUTO: 2 %
ERYTHROCYTE [DISTWIDTH] IN BLOOD BY AUTOMATED COUNT: 12.7 % (ref 10–15)
GFR SERPL CREATININE-BSD FRML MDRD: >90 ML/MIN/1.73M2
GLUCOSE BLD-MCNC: 213 MG/DL (ref 70–99)
GLUCOSE BLDC GLUCOMTR-MCNC: 194 MG/DL (ref 70–99)
HCT VFR BLD AUTO: 43.5 % (ref 40–53)
HGB BLD-MCNC: 14.5 G/DL (ref 13.3–17.7)
HOLD SPECIMEN: NORMAL
IMM GRANULOCYTES # BLD: 0 10E3/UL
IMM GRANULOCYTES NFR BLD: 1 %
LYMPHOCYTES # BLD AUTO: 2 10E3/UL (ref 0.8–5.3)
LYMPHOCYTES NFR BLD AUTO: 23 %
MCH RBC QN AUTO: 29.4 PG (ref 26.5–33)
MCHC RBC AUTO-ENTMCNC: 33.3 G/DL (ref 31.5–36.5)
MCV RBC AUTO: 88 FL (ref 78–100)
MONOCYTES # BLD AUTO: 0.6 10E3/UL (ref 0–1.3)
MONOCYTES NFR BLD AUTO: 7 %
NEUTROPHILS # BLD AUTO: 5.9 10E3/UL (ref 1.6–8.3)
NEUTROPHILS NFR BLD AUTO: 66 %
NRBC # BLD AUTO: 0 10E3/UL
NRBC BLD AUTO-RTO: 0 /100
PLATELET # BLD AUTO: 347 10E3/UL (ref 150–450)
POTASSIUM BLD-SCNC: 3.4 MMOL/L (ref 3.4–5.3)
PROT SERPL-MCNC: 7.4 G/DL (ref 6.8–8.8)
RBC # BLD AUTO: 4.94 10E6/UL (ref 4.4–5.9)
SARS-COV-2 RNA RESP QL NAA+PROBE: NEGATIVE
SODIUM SERPL-SCNC: 136 MMOL/L (ref 133–144)
WBC # BLD AUTO: 8.6 10E3/UL (ref 4–11)

## 2021-07-13 PROCEDURE — 99285 EMERGENCY DEPT VISIT HI MDM: CPT | Performed by: EMERGENCY MEDICINE

## 2021-07-13 PROCEDURE — U0005 INFEC AGEN DETEC AMPLI PROBE: HCPCS | Performed by: EMERGENCY MEDICINE

## 2021-07-13 PROCEDURE — 83036 HEMOGLOBIN GLYCOSYLATED A1C: CPT | Performed by: PHYSICIAN ASSISTANT

## 2021-07-13 PROCEDURE — 96361 HYDRATE IV INFUSION ADD-ON: CPT | Performed by: EMERGENCY MEDICINE

## 2021-07-13 PROCEDURE — 999N000127 HC STATISTIC PERIPHERAL IV START W US GUIDANCE

## 2021-07-13 PROCEDURE — 250N000011 HC RX IP 250 OP 636: Performed by: STUDENT IN AN ORGANIZED HEALTH CARE EDUCATION/TRAINING PROGRAM

## 2021-07-13 PROCEDURE — C9803 HOPD COVID-19 SPEC COLLECT: HCPCS | Performed by: EMERGENCY MEDICINE

## 2021-07-13 PROCEDURE — 99207 PR APP CREDIT; MD BILLING SHARED VISIT: CPT | Performed by: PHYSICIAN ASSISTANT

## 2021-07-13 PROCEDURE — 250N000011 HC RX IP 250 OP 636: Performed by: PHYSICIAN ASSISTANT

## 2021-07-13 PROCEDURE — 258N000003 HC RX IP 258 OP 636: Performed by: EMERGENCY MEDICINE

## 2021-07-13 PROCEDURE — 74177 CT ABD & PELVIS W/CONTRAST: CPT | Mod: 26 | Performed by: RADIOLOGY

## 2021-07-13 PROCEDURE — 96375 TX/PRO/DX INJ NEW DRUG ADDON: CPT | Performed by: EMERGENCY MEDICINE

## 2021-07-13 PROCEDURE — 250N000012 HC RX MED GY IP 250 OP 636 PS 637: Performed by: PHYSICIAN ASSISTANT

## 2021-07-13 PROCEDURE — 120N000002 HC R&B MED SURG/OB UMMC

## 2021-07-13 PROCEDURE — 85025 COMPLETE CBC W/AUTO DIFF WBC: CPT | Performed by: EMERGENCY MEDICINE

## 2021-07-13 PROCEDURE — 82962 GLUCOSE BLOOD TEST: CPT

## 2021-07-13 PROCEDURE — 99285 EMERGENCY DEPT VISIT HI MDM: CPT | Mod: 25 | Performed by: EMERGENCY MEDICINE

## 2021-07-13 PROCEDURE — 82040 ASSAY OF SERUM ALBUMIN: CPT | Performed by: EMERGENCY MEDICINE

## 2021-07-13 PROCEDURE — 96365 THER/PROPH/DIAG IV INF INIT: CPT | Performed by: EMERGENCY MEDICINE

## 2021-07-13 PROCEDURE — 250N000009 HC RX 250: Performed by: PHYSICIAN ASSISTANT

## 2021-07-13 PROCEDURE — 74177 CT ABD & PELVIS W/CONTRAST: CPT

## 2021-07-13 PROCEDURE — 36592 COLLECT BLOOD FROM PICC: CPT | Performed by: EMERGENCY MEDICINE

## 2021-07-13 PROCEDURE — 96376 TX/PRO/DX INJ SAME DRUG ADON: CPT | Performed by: EMERGENCY MEDICINE

## 2021-07-13 PROCEDURE — 36415 COLL VENOUS BLD VENIPUNCTURE: CPT | Performed by: EMERGENCY MEDICINE

## 2021-07-13 PROCEDURE — 99222 1ST HOSP IP/OBS MODERATE 55: CPT | Mod: AI | Performed by: INTERNAL MEDICINE

## 2021-07-13 RX ORDER — DEXTROSE MONOHYDRATE 25 G/50ML
25-50 INJECTION, SOLUTION INTRAVENOUS
Status: DISCONTINUED | OUTPATIENT
Start: 2021-07-13 | End: 2021-07-17 | Stop reason: HOSPADM

## 2021-07-13 RX ORDER — MORPHINE SULFATE 2 MG/ML
2 INJECTION, SOLUTION INTRAMUSCULAR; INTRAVENOUS ONCE
Status: COMPLETED | OUTPATIENT
Start: 2021-07-13 | End: 2021-07-13

## 2021-07-13 RX ORDER — ONDANSETRON 2 MG/ML
4 INJECTION INTRAMUSCULAR; INTRAVENOUS EVERY 6 HOURS PRN
Status: DISCONTINUED | OUTPATIENT
Start: 2021-07-13 | End: 2021-07-17 | Stop reason: HOSPADM

## 2021-07-13 RX ORDER — NICOTINE POLACRILEX 4 MG
15-30 LOZENGE BUCCAL
Status: DISCONTINUED | OUTPATIENT
Start: 2021-07-13 | End: 2021-07-17 | Stop reason: HOSPADM

## 2021-07-13 RX ORDER — MORPHINE SULFATE 2 MG/ML
2-4 INJECTION, SOLUTION INTRAMUSCULAR; INTRAVENOUS
Status: DISCONTINUED | OUTPATIENT
Start: 2021-07-13 | End: 2021-07-15

## 2021-07-13 RX ORDER — ONDANSETRON 4 MG/1
4 TABLET, ORALLY DISINTEGRATING ORAL EVERY 6 HOURS PRN
Status: DISCONTINUED | OUTPATIENT
Start: 2021-07-13 | End: 2021-07-17 | Stop reason: HOSPADM

## 2021-07-13 RX ORDER — PROCHLORPERAZINE 25 MG
25 SUPPOSITORY, RECTAL RECTAL EVERY 12 HOURS PRN
Status: DISCONTINUED | OUTPATIENT
Start: 2021-07-13 | End: 2021-07-17 | Stop reason: HOSPADM

## 2021-07-13 RX ORDER — SODIUM CHLORIDE 9 MG/ML
INJECTION, SOLUTION INTRAVENOUS CONTINUOUS
Status: DISCONTINUED | OUTPATIENT
Start: 2021-07-13 | End: 2021-07-15

## 2021-07-13 RX ORDER — LIDOCAINE 40 MG/G
CREAM TOPICAL
Status: DISCONTINUED | OUTPATIENT
Start: 2021-07-13 | End: 2021-07-17 | Stop reason: HOSPADM

## 2021-07-13 RX ORDER — IOPAMIDOL 755 MG/ML
101 INJECTION, SOLUTION INTRAVASCULAR ONCE
Status: COMPLETED | OUTPATIENT
Start: 2021-07-13 | End: 2021-07-13

## 2021-07-13 RX ORDER — PROCHLORPERAZINE MALEATE 5 MG
10 TABLET ORAL EVERY 6 HOURS PRN
Status: DISCONTINUED | OUTPATIENT
Start: 2021-07-13 | End: 2021-07-17 | Stop reason: HOSPADM

## 2021-07-13 RX ADMIN — SODIUM CHLORIDE 1000 ML: 900 INJECTION, SOLUTION INTRAVENOUS at 11:27

## 2021-07-13 RX ADMIN — INSULIN ASPART 2 UNITS: 100 INJECTION, SOLUTION INTRAVENOUS; SUBCUTANEOUS at 21:00

## 2021-07-13 RX ADMIN — TOPICAL ANESTHETIC 1 ML: 200 SPRAY DENTAL; PERIODONTAL at 20:39

## 2021-07-13 RX ADMIN — FAMOTIDINE 20 MG: 20 INJECTION, SOLUTION INTRAVENOUS at 22:32

## 2021-07-13 RX ADMIN — ONDANSETRON 4 MG: 2 INJECTION INTRAMUSCULAR; INTRAVENOUS at 20:11

## 2021-07-13 RX ADMIN — MORPHINE SULFATE 4 MG: 2 INJECTION, SOLUTION INTRAMUSCULAR; INTRAVENOUS at 22:18

## 2021-07-13 RX ADMIN — MORPHINE SULFATE 2 MG: 2 INJECTION, SOLUTION INTRAMUSCULAR; INTRAVENOUS at 20:10

## 2021-07-13 RX ADMIN — SODIUM CHLORIDE: 900 INJECTION, SOLUTION INTRAVENOUS at 22:18

## 2021-07-13 RX ADMIN — INSULIN GLARGINE 15 UNITS: 100 INJECTION, SOLUTION SUBCUTANEOUS at 20:59

## 2021-07-13 RX ADMIN — SODIUM CHLORIDE: 900 INJECTION, SOLUTION INTRAVENOUS at 12:42

## 2021-07-13 RX ADMIN — IOPAMIDOL 101 ML: 755 INJECTION, SOLUTION INTRAVENOUS at 13:18

## 2021-07-13 ASSESSMENT — MIFFLIN-ST. JEOR: SCORE: 1484.44

## 2021-07-13 NOTE — ED PROVIDER NOTES
[place] EMERGENCY DEPARTMENT ([facility])  July 13, 2021  ED 25 10:23 AM   History     Chief Complaint   Patient presents with     Abdominal Pain     concern sbo     The history is provided by the patient and medical records.     Andrew Lockwood is a 55 year old male with history of HIV/AIDS (viral load 7,192,018 in 2018, absolute CD4 count of 540 6/2021), type 2 diabetes, prior history of CNS toxoplasmosis, recurrent small bowel obstructions who presents with abdominal pain that started at 5 am today. Pain focal in left lower quadrant. Abdomen feels more swollen and he feels lightheaded with this.  He denies nausea.  He notes similar pain last year when he was diagnosed with SBO. This abdominal pain lasted 2 days, was managed nonoperatively.  Has had COVID-19 vaccination.      PAST MEDICAL HISTORY:   Past Medical History:   Diagnosis Date     AIDS (H)      Allergic rhinitis due to other allergen     DNS     Anal dysplasia      Chronic abdominal pain      CNS toxoplasmosis (H)      Diabetes type 2, controlled (H)      GERD (gastroesophageal reflux disease)      History of seizure      History of substance abuse (H)      HIV (human immunodeficiency virus infection) (H)      HLD (hyperlipidemia)      Lung nodules      Periungual wart      PTSD (post-traumatic stress disorder)      Sleep apnea     doesn't use CPAP       PAST SURGICAL HISTORY:   Past Surgical History:   Procedure Laterality Date     ANOSCOPY N/A 9/9/2020    Procedure: Exam Under Anesthesia, ANOSCOPY, fulgeration of rectal fissures with Rectal Biopsies;  Surgeon: Thanh Lundberg MD;  Location: [facility] OR     COLONOSCOPY Left 1/22/2016    Procedure: COMBINED COLONOSCOPY, SINGLE OR MULTIPLE BIOPSY/POLYPECTOMY BY BIOPSY;  Surgeon: Clark Saini MD;  Location: [facility] GI     HC EXPLORE UNDESC TESTIS,INGUIN/SCROTAL       LAPAROSCOPIC APPENDECTOMY N/A 1/31/2018    Procedure: LAPAROSCOPIC APPENDECTOMY;  LAPAROSCOPIC APPENDECTOMY;  Surgeon: Yanet  Dawn Butler MD;  Location: [facility] OR     LAPAROSCOPY DIAGNOSTIC (GENERAL) N/A 7/26/2016    Procedure: LAPAROSCOPY DIAGNOSTIC (GENERAL);  Surgeon: Susannah Arriaga MD;  Location: [facility] OR     LAPAROSCOPY DIAGNOSTIC (GENERAL) N/A 4/16/2018    Procedure: LAPAROSCOPY DIAGNOSTIC (GENERAL);  Diagnostic laparoscopy and lysis of adhesions;  Surgeon: Prince Dowling MD;  Location: [facility] OR     OPTICAL TRACKING SYSTEM CRANIOTOMY, EXCISE TUMOR, COMBINED Left 4/10/2015    Procedure: COMBINED OPTICAL TRACKING SYSTEM CRANIOTOMY, EXCISE TUMOR;  Surgeon: Mirlande Colmenares MD;  Location: [facility] OR     REPAIR GAMEKEEPER'S THUMB Right 12/2/2016    Procedure: REPAIR LIGAMENT ULNAR COLLATERAL THUMB (GAMEKEEPER'S);  Surgeon: Evin Zamorano MD;  Location:  OR     [facility] NONSPECIFIC PROCEDURE      right forearm fracture       Past medical history, past surgical history, medications, and allergies were reviewed with the patient. Additional pertinent items: None    FAMILY HISTORY:   Family History   Problem Relation Age of Onset     Diabetes Brother      Diabetes Father      Alzheimer Disease Father      Unknown/Adopted Mother      Diabetes Paternal Grandfather      Cancer No family hx of         no skin cancer     Skin Cancer No family hx of         no famiy hx of skin cancer     Glaucoma No family hx of      Macular Degeneration No family hx of        SOCIAL HISTORY:   Social History     Tobacco Use     Smoking status: Current Every Day Smoker     Packs/day: 0.50     Years: 40.00     Pack years: 20.00     Types: Cigarettes     Smokeless tobacco: Former User   Substance Use Topics     Alcohol use: No     Alcohol/week: 0.0 standard drinks     Comment: Last etoh in 2007     Social history was reviewed with the patient. Additional pertinent items: None      Patient's Medications   New Prescriptions    No medications on file   Previous Medications    ALCOHOL SWAB PREP PADS    Use to swab area of injection/todd as directed.     BICTEGRAVIR-EMTRICITABINE-TENOFOVIR (BIKTARVY) -25 MG PER TABLET    Take 1 tablet by mouth daily    BLOOD GLUCOSE (NO BRAND SPECIFIED) LANCETS STANDARD    Use to test blood sugar four times daily or as directed.    BLOOD GLUCOSE (NO BRAND SPECIFIED) TEST STRIP    1 strip by In Vitro route 4 times daily (before meals and nightly) Use to test blood sugars 4 times daily or as directed    BLOOD GLUCOSE (NO BRAND SPECIFIED) TEST STRIP    Use to test blood sugar 4 times daily or as directed. To accompany:whatever is covered    BLOOD GLUCOSE (NO BRAND SPECIFIED) TEST STRIP    Use to test blood sugar 4-6 times daily or as directed. To accompany: Blood Glucose Monitor Brands: per insurance.    BLOOD GLUCOSE CALIBRATION (NO BRAND SPECIFIED) SOLUTION    To accompany: whatever is covered    BLOOD GLUCOSE CALIBRATION (NO BRAND SPECIFIED) SOLUTION    To accompany: Blood Glucose Monitor Brands: per insurance.    BLOOD GLUCOSE MONITORING (NO BRAND SPECIFIED) METER DEVICE KIT    Use to test blood sugar 4 times daily or as directed.    BLOOD GLUCOSE MONITORING (NO BRAND SPECIFIED) METER DEVICE KIT    Use to test blood sugar 4 times daily or as directed. Preferred blood glucose meter OR supplies to accompany: whatever is covered    BLOOD GLUCOSE MONITORING (NO BRAND SPECIFIED) METER DEVICE KIT    Use to test blood sugar 4-6 times daily or as directed. Preferred blood glucose meter OR supplies to accompany: Blood Glucose Monitor Brands: per insurance.    FAMOTIDINE (PEPCID) 20 MG TABLET    Take 1 tablet (20 mg) by mouth 2 times daily    INSULIN GLARGINE (BASAGLAR KWIKPEN) 100 UNIT/ML PEN    Inject 35 Units Subcutaneous 2 times daily    INSULIN GLARGINE (LANTUS SOLOSTAR) 100 UNIT/ML PEN    Inject 35 Units Subcutaneous 2 times daily    INSULIN PEN NEEDLE (B-D U/F) 31G X 8 MM MISCELLANEOUS    Use 1 pen needles daily or as directed.    INSULIN PEN NEEDLE (BD SCOTT U/F) 32G X 4 MM MISCELLANEOUS    USE 3 TO 4 NEEDLES DAILY    LANCETS  "(ONETOUCH DELICA PLUS [id number]) MISC    USE TO TEST BLOOD SUGAR FOUR TIMES DAILY OR AS DIRECTED    NOVOLOG FLEXPEN 100 UNIT/ML SOLN    Inject 20 units with each meal, along with sliding scale 1 unit per 25mg/dL over 140 before meals and over 200 at bedtime. Max daily dose 90 units    SENNA-DOCUSATE SODIUM (SENNA S) 8.6-50 MG TABLET    Take 1 tablet by mouth 2 times daily Hold for loose stools.    STATIN NOT PRESCRIBED (INTENTIONAL)    Please choose reason not prescribed, below    THIN (NO BRAND SPECIFIED) LANCETS    Use with lanceting device. To accompany: Test 4 times daily with whatever is covered    THIN (NO BRAND SPECIFIED) LANCETS    Use with lanceting device. To accompany: Blood Glucose Monitor Brands: per insurance.   Modified Medications    No medications on file   Discontinued Medications    No medications on file          Allergies   Allergen Reactions     Metformin      Abdominal pain     Milk [Lac Bovis] Hives     Tylenol [Acetaminophen] Itching     Dulaglutide Rash     Penicillin V Other (See Comments) and Rash     Diffuse maculopapular rash + feels \"high\", per pt.         Review of Systems   Constitutional: Negative for fever.   Respiratory: Negative for shortness of breath.    Cardiovascular: Negative for chest pain.   Gastrointestinal: Negative for abdominal pain.   All other systems reviewed and are negative.    A complete review of systems was performed with pertinent positives and negatives noted in the HPI, and all other systems negative.    Physical Exam   BP: 134/83  Pulse: 90  Temp: 97.8  F (36.6  C)  Resp: 18  Height: 162.6 cm (5' 4\")  Weight: 74.8 kg (165 lb)  SpO2: 100 %      Physical Exam  Vitals and nursing note reviewed.   Constitutional:       General: He is in acute distress (in pain).      Appearance: He is not diaphoretic.   HENT:      Head: Atraumatic.      Mouth/Throat:      Mouth: Mucous membranes are dry.   Eyes:      General: No scleral icterus.     Extraocular Movements: " Extraocular movements intact.   Cardiovascular:      Rate and Rhythm: Normal rate and regular rhythm.      Heart sounds: Normal heart sounds.   Pulmonary:      Effort: Pulmonary effort is normal. No respiratory distress.   Chest:      Chest wall: No tenderness.   Abdominal:      Palpations: Abdomen is soft.      Tenderness: There is generalized abdominal tenderness. There is no right CVA tenderness, left CVA tenderness, guarding or rebound.       Musculoskeletal:      Cervical back: Neck supple.      Right lower leg: No edema.      Left lower leg: No edema.   Skin:     General: Skin is warm.      Coloration: Skin is not pale.   Neurological:      General: No focal deficit present.      Mental Status: He is alert and oriented to person, place, and time.         ED Course        Procedures                          Results for orders placed or performed during the hospital encounter of 07/13/21 (from the past 24 hour(s))   [place] Draw    Narrative    The following orders were created for panel order [place] Draw.  Procedure                               Abnormality         Status                     ---------                               -----------         ------                     Extra Blue Top Tube[[id number]]                              Final result               Extra Red Top Tube[[id number]]                               Final result               Extra Green Top (Lithium...[[id number]]                      Final result               Extra Purple Top Tube[[id number]]                            Final result               Extra Green Top (Lithium...[[id number]]                      Final result                 Please view results for these tests on the individual orders.   CBC with platelets differential    Narrative    The following orders were created for panel order CBC with platelets differential.  Procedure                               Abnormality         Status                     ---------                                -----------         ------                     CBC with platelets and d...[[id number]]                      Final result                 Please view results for these tests on the individual orders.   Comprehensive metabolic panel   Result Value Ref Range    Sodium 136 133 - 144 mmol/L    Potassium 3.4 3.4 - 5.3 mmol/L    Chloride 103 94 - 109 mmol/L    Carbon Dioxide (CO2) 26 20 - 32 mmol/L    Anion Gap 7 3 - 14 mmol/L    Urea Nitrogen 17 7 - 30 mg/dL    Creatinine 0.91 0.66 - 1.25 mg/dL    Calcium 9.2 8.5 - 10.1 mg/dL    Glucose 213 (H) 70 - 99 mg/dL    Alkaline Phosphatase 129 40 - 150 U/L    AST 18 0 - 45 U/L    ALT 24 0 - 70 U/L    Protein Total 7.4 6.8 - 8.8 g/dL    Albumin 3.9 3.4 - 5.0 g/dL    Bilirubin Total 1.4 (H) 0.2 - 1.3 mg/dL    GFR Estimate >90 >60 mL/min/1.73m2   Extra Blue Top Tube   Result Value Ref Range    Hold Specimen JIC    Extra Red Top Tube   Result Value Ref Range    Hold Specimen JIC    Extra Green Top (Lithium Heparin) Tube   Result Value Ref Range    Hold Specimen JIC    Extra Purple Top Tube   Result Value Ref Range    Hold Specimen JIC    Extra Green Top (Lithium Heparin) ON ICE   Result Value Ref Range    Hold Specimen JIC    CBC with platelets and differential   Result Value Ref Range    WBC Count 8.6 4.0 - 11.0 10e3/uL    RBC Count 4.94 4.40 - 5.90 10e6/uL    Hemoglobin 14.5 13.3 - 17.7 g/dL    Hematocrit 43.5 40.0 - 53.0 %    MCV 88 78 - 100 fL    MCH 29.4 26.5 - 33.0 pg    MCHC 33.3 31.5 - 36.5 g/dL    RDW 12.7 10.0 - 15.0 %    Platelet Count 347 150 - 450 10e3/uL    % Neutrophils 66 %    % Lymphocytes 23 %    % Monocytes 7 %    % Eosinophils 2 %    % Basophils 1 %    % Immature Granulocytes 1 %    NRBCs per 100 WBC 0 <1 /100    Absolute Neutrophils 5.9 1.6 - 8.3 10e3/uL    Absolute Lymphocytes 2.0 0.8 - 5.3 10e3/uL    Absolute Monocytes 0.6 0.0 - 1.3 10e3/uL    Absolute Eosinophils 0.1 0.0 - 0.7 10e3/uL    Absolute Basophils 0.1 0.0 - 0.2 10e3/uL    Absolute Immature  Granulocytes 0.0 <=0.0 10e3/uL    Absolute NRBCs 0.0 10e3/uL   CT Abdomen Pelvis w Contrast    Narrative    EXAMINATION: CT ABDOMEN PELVIS W CONTRAST, 7/13/2021 1:36 PM    TECHNIQUE:  Helical CT images from the lung bases through the  symphysis pubis were obtained with IV contrast. Contrast dose:  iopamidol (ISOVUE-370) solution 101 mL    COMPARISON: 1/10/2021 CT    HISTORY: Abdominal distension; Left mid abd pain, hx of bowel  obstructions.    FINDINGS:    Abdomen and pelvis: Dilated segments of mid small bowel in the left  midabdomen with mild perienteric fat stranding along the mesenteric  aspect of the most dilated segment (series 5 image 217). The bowel  tapers fairly abruptly in the anterior central abdomen. Fecalization  of intraluminal contents upstream of the transition point. The  downstream small bowel and colon are decompressed. Surgically absent  appendix. No pneumatosis, portal venous gas, or free air.    Focal fat deposition along the calcified ligament. No focal suspicious  hepatic lesion. Single tiny calcified stone in the gallbladder lumen.  No gallbladder wall thickening or pericholecystic fluid. The intra and  extrahepatic biliary tree, pancreas, spleen, adrenal glands, and renal  cortices are normal. No hydronephrosis, hydroureter, or urinary tract  stone. Normal bladder. Partially visualized penile implant with  reservoir in the anterior left pelvis adjacent to the bladder. Mildly  enlarged prostate. Symmetric seminal vesicles. No free fluid in the  pelvis. The major abdominal vasculature is patent. No abdominal or  pelvic lymphadenopathy.    Lung bases/lower chest:  Clear.    Bones and soft tissues: No acute or aggressive osseus abnormality.      Impression    IMPRESSION: Mid small bowel obstruction with transition point in the  anterior central abdomen, likely adhesive. No pneumatosis, portal  venous gas, or free air.     JOSE VITAL DO         SYSTEM ID:  [id number]   Asymptomatic COVID-19  Virus (Coronavirus) by PCR Nasopharyngeal    Specimen: Nasopharyngeal; Swab    Narrative    The following orders were created for panel order Asymptomatic COVID-19 Virus (Coronavirus) by PCR Nasopharyngeal.  Procedure                               Abnormality         Status                     ---------                               -----------         ------                     SARS-COV2 (COVID-19) Vir...[[id number]]                      In process                   Please view results for these tests on the individual orders.     *Note: Due to a large number of results and/or encounters for the requested time period, some results have not been displayed. A complete set of results can be found in Results Review.     Medications   0.9% sodium chloride BOLUS (0 mLs Intravenous Stopped 7/13/21 1238)     Followed by   sodium chloride 0.9% infusion ( Intravenous Rate/Dose Verify 7/13/21 1540)   iopamidol (ISOVUE-370) solution 101 mL (101 mLs Intravenous Given 7/13/21 1318)   sodium chloride (PF) 0.9% PF flush 75 mL (75 mLs Intravenous Given 7/13/21 1319)             Assessments & Plan (with Medical Decision Making)   The patient has a return of his symptoms consistent with prior episodes of small bowel obstruction. His last obstruction was November 11, 2020. This resolved spontaneously without NG tube or surgery. Given his degree of tenderness he was sent for CT scan which does demonstrate dilated loops of small bowel with a transition point consistent with a small bowel obstruction. Arrangements were made to admit him to the hospital and surgery was consulted. I did talk with the patient again about his diagnosis and recommended NG tube. At this time he is declining saying that he does not feel nauseated and would strongly not like to have that procedure. The patient will either be admitted to internal medicine or surgery, is getting IV fluids and will get ongoing Dilaudid to manage his pain as needed.    I have  reviewed the nursing notes.    I have reviewed the findings, diagnosis, plan and need for follow up with the patient.    New Prescriptions    No medications on file       Final diagnoses:   Small bowel obstruction (H)   I, Yadira Boss, am serving as a trained medical scribe to document services personally performed by Milton Elmore MD based on the provider's statements to me on July 13, 2021.  This document has been checked and approved by the attending provider.    I, Milton Elmore MD, was physically present and have reviewed and verified the accuracy of this note documented by Yadira Boss, medical scribe.       MILTON ELMORE MD    7/13/2021   [facility] EMERGENCY DEPARTMENT     Milton Elmore MD  07/13/21 1716       Milton Elmore MD  07/14/21 1413

## 2021-07-13 NOTE — ED NOTES
Bed: ED25  Expected date: 7/13/21  Expected time: 9:33 AM  Means of arrival: Ambulance  Comments:  [facility] 423 with with a 55 yo M reports of a bowel obstruction

## 2021-07-13 NOTE — CONSULTS
General Surgery Consultation    Andrew Lockwood MRN# [id number]   Age: 55 year old YOB: 1965     Date of Admission:  7/13/2021    Date of Consult:   7/13/21    Reason for consult: Small bowel obstruction       Requesting service: Emergency Department; requesting provider: Dr. Elmore                   Assessment and Plan:   Assessment:  The patient is a 55-year-old male with a history of HIV, type 2 diabetes, and toxoplasmosis, who presents with recurrent small bowel obstruction.  He has repeatedly and definitively stated that he would not want surgery even if it were to mean to accept his own death.  Although previously undergoing diagnostic laparoscopies with lysis of adhesions, more recent small bowel obstructions have been successfully managed medically.  We recommend gastric decompression with NGT, Gastrografin challenge via NGT, and bowel rest.  If the patient continues to refuse an NG tube, there would be no means for emptying the stomach. In such a case would not recommend oral contrast given the already large volume of gastric content.     Plan:  -Patient repeatedly and definitively stated that he would not want surgery even if it were to mean to accept his own death  -Continue medical management of small bowel obstruction      Discussed with staff, Dr. Hernandez, who agrees with the above plan.    Adrien Lopez MD  Surgery resident  [id number]      ADDENDUM  Patient reconsidered and was open to surgery. Reviewed imaging and patient with Dr. Hernandez. No indication for operative intervention - appears to be more likely a functional etiology rather than mechanical. Patient now agreeable to NGT.    -NGT to LIS  -Plan for GG challenge in AM  -Surgery will follow    Discussed with staff    Venu Sterling MD  General Surgery PGY-7             Chief Complaint:   Crampy abdominal pain         History of Present Illness:   Andrew Lockwood is a 55-year-old male with a history of HIV, type 2 diabetes, neuro  "toxoplasmosis, and recurrent SBO who presents with crampy abdominal pain and nausea that began at 3 AM.  Symptoms started after he reportedly ate a large amount of walnuts.  Crampy abdominal pain in his epigastrium has gotten worse since then.  His last bowel movement was at 3 AM.  He is not tolerated a diet since the onset of symptoms.  He is nauseous but has not vomited, and denies fevers, chills, chest pain, shortness of breath.  His surgical history is notable for 2 prior diagnostic laparoscopies with lysis of adhesions for small bowel obstruction, laparoscopic appendectomy, and penile prosthesis placement.  He has frequent visits to the emergency department for small bowel obstructions that have previously resolved with conservative management.  Here, a CT scan demonstrates another small bowel obstruction with transition point in the mid abdomen.  He has refused an NG tube and states that he is \"done with surgery.\"  He tells me that he \"never wants to have surgery again.\" Mr. Lockwood repeatedly expressed his desire not to have surgery even if if his life were at risk. Through multiple conversations with Mr. Lockwood has consistently reported he would refuse surgery even in the setting of impending death.          Past Medical History:     Past Medical History:   Diagnosis Date     AIDS (H)      Allergic rhinitis due to other allergen     DNS     Anal dysplasia      Chronic abdominal pain      CNS toxoplasmosis (H)      Diabetes type 2, controlled (H)      GERD (gastroesophageal reflux disease)      History of seizure      History of substance abuse (H)      HIV (human immunodeficiency virus infection) (H)      HLD (hyperlipidemia)      Lung nodules      Periungual wart      PTSD (post-traumatic stress disorder)      Sleep apnea     doesn't use CPAP             Past Surgical History:     Past Surgical History:   Procedure Laterality Date     ANOSCOPY N/A 9/9/2020    Procedure: Exam Under Anesthesia, ANOSCOPY, " fulgeration of rectal fissures with Rectal Biopsies;  Surgeon: Thanh Lundberg MD;  Location: [facility] OR     COLONOSCOPY Left 1/22/2016    Procedure: COMBINED COLONOSCOPY, SINGLE OR MULTIPLE BIOPSY/POLYPECTOMY BY BIOPSY;  Surgeon: Clark Saini MD;  Location: [facility] GI     HC EXPLORE UNDESC TESTIS,INGUIN/SCROTAL       LAPAROSCOPIC APPENDECTOMY N/A 1/31/2018    Procedure: LAPAROSCOPIC APPENDECTOMY;  LAPAROSCOPIC APPENDECTOMY;  Surgeon: Dawn Holt MD;  Location: [facility] OR     LAPAROSCOPY DIAGNOSTIC (GENERAL) N/A 7/26/2016    Procedure: LAPAROSCOPY DIAGNOSTIC (GENERAL);  Surgeon: Susannah Arriaga MD;  Location: [facility]     LAPAROSCOPY DIAGNOSTIC (GENERAL) N/A 4/16/2018    Procedure: LAPAROSCOPY DIAGNOSTIC (GENERAL);  Diagnostic laparoscopy and lysis of adhesions;  Surgeon: Prince Dowling MD;  Location: [facility] OR     OPTICAL TRACKING SYSTEM CRANIOTOMY, EXCISE TUMOR, COMBINED Left 4/10/2015    Procedure: COMBINED OPTICAL TRACKING SYSTEM CRANIOTOMY, EXCISE TUMOR;  Surgeon: Mirlande Colmenares MD;  Location: [facility] OR     REPAIR GAMEKEEPER'S THUMB Right 12/2/2016    Procedure: REPAIR LIGAMENT ULNAR COLLATERAL THUMB (GAMEKEEPER'S);  Surgeon: Evin Zamorano MD;  Location: [facility] OR     ZZC NONSPECIFIC PROCEDURE      right forearm fracture             Social History:     Social History     Tobacco Use     Smoking status: Current Every Day Smoker     Packs/day: 0.50     Years: 40.00     Pack years: 20.00     Types: Cigarettes     Smokeless tobacco: Former User   Substance Use Topics     Alcohol use: No     Alcohol/week: 0.0 standard drinks     Comment: Last etoh in 2007             Family History:     Family History   Problem Relation Age of Onset     Diabetes Brother      Diabetes Father      Alzheimer Disease Father      Unknown/Adopted Mother      Diabetes Paternal Grandfather      Cancer No family hx of         no skin cancer     Skin Cancer No family hx of         no famiy hx of skin cancer     Glaucoma  "No family hx of      Macular Degeneration No family hx of                 Allergies:     Allergies   Allergen Reactions     Metformin      Abdominal pain     Milk [Lac Bovis] Hives     Tylenol [Acetaminophen] Itching     Dulaglutide Rash     Penicillin V Other (See Comments) and Rash     Diffuse maculopapular rash + feels \"high\", per pt.              Medications:     Current Facility-Administered Medications   Medication     sodium chloride 0.9% infusion     Current Outpatient Medications   Medication Sig     bictegravir-emtricitabine-tenofovir (BIKTARVY) -25 MG per tablet Take 1 tablet by mouth daily     famotidine (PEPCID) 20 MG tablet Take 1 tablet (20 mg) by mouth 2 times daily     insulin glargine (LANTUS SOLOSTAR) 100 UNIT/ML pen Inject 35 Units Subcutaneous 2 times daily     NOVOLOG FLEXPEN 100 UNIT/ML soln Inject 20 units with each meal, along with sliding scale 1 unit per 25mg/dL over 140 before meals and over 200 at bedtime. Max daily dose 90 units     alcohol swab prep pads Use to swab area of injection/todd as directed.     blood glucose (NO BRAND SPECIFIED) lancets standard Use to test blood sugar four times daily or as directed.     blood glucose (NO BRAND SPECIFIED) test strip Use to test blood sugar 4 times daily or as directed. To accompany:whatever is covered     blood glucose (NO BRAND SPECIFIED) test strip Use to test blood sugar 4-6 times daily or as directed. To accompany: Blood Glucose Monitor Brands: per insurance.     blood glucose (NO BRAND SPECIFIED) test strip 1 strip by In Vitro route 4 times daily (before meals and nightly) Use to test blood sugars 4 times daily or as directed     blood glucose calibration (NO BRAND SPECIFIED) solution To accompany: whatever is covered     blood glucose calibration (NO BRAND SPECIFIED) solution To accompany: Blood Glucose Monitor Brands: per insurance.     blood glucose monitoring (NO BRAND SPECIFIED) meter device kit Use to test blood sugar 4 " "times daily or as directed. Preferred blood glucose meter OR supplies to accompany: whatever is covered     blood glucose monitoring (NO BRAND SPECIFIED) meter device kit Use to test blood sugar 4-6 times daily or as directed. Preferred blood glucose meter OR supplies to accompany: Blood Glucose Monitor Brands: per insurance.     blood glucose monitoring (NO BRAND SPECIFIED) meter device kit Use to test blood sugar 4 times daily or as directed.     insulin glargine (BASAGLAR KWIKPEN) 100 UNIT/ML pen Inject 35 Units Subcutaneous 2 times daily (Patient not taking: Reported on 7/13/2021)     insulin pen needle (B-D U/F) 31G X 8 MM miscellaneous Use 1 pen needles daily or as directed.     insulin pen needle (BD SCOTT U/F) 32G X 4 MM miscellaneous USE 3 TO 4 NEEDLES DAILY     Lancets (ONETOUCH DELICA PLUS [id number]) MISC USE TO TEST BLOOD SUGAR FOUR TIMES DAILY OR AS DIRECTED     SENNA-docusate sodium (SENNA S) 8.6-50 MG tablet Take 1 tablet by mouth 2 times daily Hold for loose stools. (Patient not taking: Reported on 7/13/2021)     STATIN NOT PRESCRIBED (INTENTIONAL) Please choose reason not prescribed, below     thin (NO BRAND SPECIFIED) lancets Use with lanceting device. To accompany: Test 4 times daily with whatever is covered     thin (NO BRAND SPECIFIED) lancets Use with lanceting device. To accompany: Blood Glucose Monitor Brands: per insurance.               Review of Systems:   A 10 point review of systems was obtained and found to be negative unless otherwise noted in the above HPI.          Physical Exam:   BP (!) 147/92   Pulse 87   Temp 97.8  F (36.6  C) (Oral)   Resp 18   Ht 1.626 m (5' 4\")   Wt 74.8 kg (165 lb)   SpO2 100%   BMI 28.32 kg/m      No intake or output data in the 24 hours ending 07/13/21 1856    GEN: A&Ox3, appears uncomfortable  NEURO: CN II-XII grossly intact. No gross neurologic deficits  RESP: Nonlabored breathing on room air, no cough  CV: RRR by radial pulse, noncyanotic   ABD: " soft, nondistended. Tender over epigastrium and LUQ. No guarding or rebound tenderness. MSK: Moves all extremities independently. Normal active range of motion.  PSYCH: cooperative           Data:   All laboratory data reviewed    Results:  BMP  Recent Labs   Lab 07/13/21  1100      POTASSIUM 3.4   CHLORIDE 103   CO2 26   BUN 17   CR 0.91   *     CBC  Recent Labs   Lab 07/13/21  1100   WBC 8.6   HGB 14.5        LFT  Recent Labs   Lab 07/13/21  1100   AST 18   ALT 24   ALKPHOS 129   BILITOTAL 1.4*   ALBUMIN 3.9     Recent Labs   Lab 07/13/21  1100   *       Imaging:  CT abdomen pelvis with contrast shows small bowel obstruction with a transition point in the mid small bowel and proximal fecalization.  No pneumatosis, portal venous gas, or free air

## 2021-07-13 NOTE — ED TRIAGE NOTES
55YR M - presenting to ED via [facility]; from home; C/o:  Abdominal pain (started 04:30am, today); concern for SBO.

## 2021-07-13 NOTE — ED NOTES
Patient signed out to me at change of shift by Dr. Blum, please see his note for full details.  Briefly, this is a 55-year-old male with a history of HIV who presented with a bowel obstruction.  Dr. Blum did speak to the medicine triage hospitalist about this patient; hospitalist wanted to wait for formal surgery consultation before deciding upon the appropriate service for admission.    Surgery resident did see the patient and they did touch base with me and they are going to sign off at this point as the patient has told the surgery resident that he would not want surgery under any circumstances even if it meant he was going to die.  He is also declining NG tube at this time.  We will keep the patient NPO and patient will need to be admitted to medicine.  I did speak to the medicine triage hospitalist to update her about this.  Hospitalist will assign a team at this time.     Leticia Cruz MD  07/13/21 7173

## 2021-07-14 ENCOUNTER — TELEPHONE (OUTPATIENT)
Dept: INFECTIOUS DISEASES | Facility: CLINIC | Age: 56
End: 2021-07-14

## 2021-07-14 LAB
ALBUMIN SERPL-MCNC: 3 G/DL (ref 3.4–5)
ALP SERPL-CCNC: 92 U/L (ref 40–150)
ALT SERPL W P-5'-P-CCNC: 18 U/L (ref 0–70)
ANION GAP SERPL CALCULATED.3IONS-SCNC: 3 MMOL/L (ref 3–14)
AST SERPL W P-5'-P-CCNC: 14 U/L (ref 0–45)
BILIRUB SERPL-MCNC: 1.6 MG/DL (ref 0.2–1.3)
BUN SERPL-MCNC: 14 MG/DL (ref 7–30)
CALCIUM SERPL-MCNC: 8.2 MG/DL (ref 8.5–10.1)
CHLORIDE BLD-SCNC: 109 MMOL/L (ref 94–109)
CO2 SERPL-SCNC: 24 MMOL/L (ref 20–32)
CREAT SERPL-MCNC: 0.75 MG/DL (ref 0.66–1.25)
ERYTHROCYTE [DISTWIDTH] IN BLOOD BY AUTOMATED COUNT: 13.1 % (ref 10–15)
GFR SERPL CREATININE-BSD FRML MDRD: >90 ML/MIN/1.73M2
GLUCOSE BLD-MCNC: 177 MG/DL (ref 70–99)
GLUCOSE BLDC GLUCOMTR-MCNC: 144 MG/DL (ref 70–99)
GLUCOSE BLDC GLUCOMTR-MCNC: 146 MG/DL (ref 70–99)
GLUCOSE BLDC GLUCOMTR-MCNC: 152 MG/DL (ref 70–99)
GLUCOSE BLDC GLUCOMTR-MCNC: 157 MG/DL (ref 70–99)
GLUCOSE BLDC GLUCOMTR-MCNC: 160 MG/DL (ref 70–99)
GLUCOSE BLDC GLUCOMTR-MCNC: 184 MG/DL (ref 70–99)
HBA1C MFR BLD: 10.5 % (ref 0–5.6)
HCT VFR BLD AUTO: 42.3 % (ref 40–53)
HGB BLD-MCNC: 13.8 G/DL (ref 13.3–17.7)
LACTATE SERPL-SCNC: 0.6 MMOL/L (ref 0.7–2)
LACTATE SERPL-SCNC: 1.1 MMOL/L (ref 0.7–2)
MCH RBC QN AUTO: 30 PG (ref 26.5–33)
MCHC RBC AUTO-ENTMCNC: 32.6 G/DL (ref 31.5–36.5)
MCV RBC AUTO: 92 FL (ref 78–100)
PLATELET # BLD AUTO: 282 10E3/UL (ref 150–450)
POTASSIUM BLD-SCNC: 3.9 MMOL/L (ref 3.4–5.3)
PROT SERPL-MCNC: 6.2 G/DL (ref 6.8–8.8)
RBC # BLD AUTO: 4.6 10E6/UL (ref 4.4–5.9)
SODIUM SERPL-SCNC: 136 MMOL/L (ref 133–144)
WBC # BLD AUTO: 8.6 10E3/UL (ref 4–11)

## 2021-07-14 PROCEDURE — 36415 COLL VENOUS BLD VENIPUNCTURE: CPT | Performed by: PHYSICIAN ASSISTANT

## 2021-07-14 PROCEDURE — 250N000011 HC RX IP 250 OP 636: Performed by: PHYSICIAN ASSISTANT

## 2021-07-14 PROCEDURE — 82040 ASSAY OF SERUM ALBUMIN: CPT | Performed by: PHYSICIAN ASSISTANT

## 2021-07-14 PROCEDURE — 82962 GLUCOSE BLOOD TEST: CPT

## 2021-07-14 PROCEDURE — 83605 ASSAY OF LACTIC ACID: CPT | Performed by: PHYSICIAN ASSISTANT

## 2021-07-14 PROCEDURE — 99207 PR APP CREDIT; MD BILLING SHARED VISIT: CPT | Performed by: PHYSICIAN ASSISTANT

## 2021-07-14 PROCEDURE — 99233 SBSQ HOSP IP/OBS HIGH 50: CPT | Performed by: PEDIATRICS

## 2021-07-14 PROCEDURE — 120N000002 HC R&B MED SURG/OB UMMC

## 2021-07-14 PROCEDURE — 258N000003 HC RX IP 258 OP 636: Performed by: EMERGENCY MEDICINE

## 2021-07-14 PROCEDURE — 85027 COMPLETE CBC AUTOMATED: CPT | Performed by: PHYSICIAN ASSISTANT

## 2021-07-14 PROCEDURE — 96376 TX/PRO/DX INJ SAME DRUG ADON: CPT | Performed by: EMERGENCY MEDICINE

## 2021-07-14 PROCEDURE — 250N000013 HC RX MED GY IP 250 OP 250 PS 637: Performed by: STUDENT IN AN ORGANIZED HEALTH CARE EDUCATION/TRAINING PROGRAM

## 2021-07-14 RX ADMIN — FAMOTIDINE 20 MG: 20 INJECTION, SOLUTION INTRAVENOUS at 12:24

## 2021-07-14 RX ADMIN — MORPHINE SULFATE 4 MG: 2 INJECTION, SOLUTION INTRAMUSCULAR; INTRAVENOUS at 21:11

## 2021-07-14 RX ADMIN — DOCUSATE SODIUM 286 ML: 50 LIQUID ORAL at 21:29

## 2021-07-14 RX ADMIN — INSULIN ASPART 1 UNITS: 100 INJECTION, SOLUTION INTRAVENOUS; SUBCUTANEOUS at 03:50

## 2021-07-14 RX ADMIN — MORPHINE SULFATE 4 MG: 2 INJECTION, SOLUTION INTRAMUSCULAR; INTRAVENOUS at 01:40

## 2021-07-14 RX ADMIN — SODIUM CHLORIDE: 900 INJECTION, SOLUTION INTRAVENOUS at 15:04

## 2021-07-14 RX ADMIN — INSULIN ASPART 1 UNITS: 100 INJECTION, SOLUTION INTRAVENOUS; SUBCUTANEOUS at 00:05

## 2021-07-14 RX ADMIN — INSULIN ASPART 1 UNITS: 100 INJECTION, SOLUTION INTRAVENOUS; SUBCUTANEOUS at 12:28

## 2021-07-14 ASSESSMENT — ENCOUNTER SYMPTOMS
SHORTNESS OF BREATH: 0
ABDOMINAL PAIN: 0
FEVER: 0

## 2021-07-14 ASSESSMENT — ACTIVITIES OF DAILY LIVING (ADL)
ADLS_ACUITY_SCORE: 14

## 2021-07-14 NOTE — TELEPHONE ENCOUNTER
This is a summary note of ongoing case mgmt and resource coordination ongoing for him in the community via the [facility] ([facility]) and [facility]). He is very well known in the community and there is a heightened awareness of his dire need for stable housing , there is an active case advancing in his favor. They are hampered by their inconsistent ability to reach him but are talking to him today via his room line phone. We will continue to coordinate and monitor the outcome of these resources from the clinic.   Simon Cadena, [facility]  [phone]

## 2021-07-14 NOTE — PROGRESS NOTES
"Care Management Follow Up    Length of Stay (days): 1    Expected Discharge Date:  7/17/21     Concerns to be Addressed: Housing/lodging, food security concerns      Patient plan of care discussed at interdisciplinary rounds: Yes    Anticipated Discharge Disposition:  TBD     Anticipated Discharge Services: TBD   Anticipated Discharge DME:      Patient/family educated on Medicare website which has current facility and service quality ratings: No  Education Provided on the Discharge Plan:  No  Patient/Family in Agreement with the Plan: Plan TBD     Referrals Placed by CM/SW:  None at this time  Private pay costs discussed: Not applicable    Additional Information:  Pt is a \"55 year old male with PMHx HIV/AIDS on HAART therapy, CNS toxoplasmosis, Hx of Seizures, DM2, HLD, housing insecurity and several episodes of SBOs admitted on 7/13/2021, after presenting with abdominal pain, found to have small bowel obstruction.\"    SW is consulted for housing/lodging needs and food insecurity.     Per chart review, pt has had chronic housing insecurity, has previously stayed at sober living and in a hotel for the homeless.     SW attempted to meet with pt in pt's room but pt was sleeping and did not awaken to name being called. SW will attempt visit again at another time.      RADHA Valera, LICSW     [phone] (pager) [phone]  7/14/2021          "

## 2021-07-14 NOTE — PROGRESS NOTES
"General Surgery Progress Note    S: Nausea and emesis overnight. Refused NG tube. Passing gas. No BM. LUQ pain remains the same.    O: BP (!) 140/80   Pulse 87   Temp 97.2  F (36.2  C) (Oral)   Resp 18   Ht 1.626 m (5' 4\")   Wt 74.8 kg (165 lb)   SpO2 99%   BMI 28.32 kg/m      Gen: no acute distress  Resp: nonlabored breathing on room air  CV: RRR by radial pulse, noncyanotic  ABD:soft, Mild tender in LUQ and epigastriium. Nondistended. No rebound tenderness or guarding.  Ext: WWP    Labs reviewed  BMP unremarkable  Lactate normal  Repeat CBC pending, was normal on admission yesterday    A/P: Andrew Lockwood is a 55 y.o. male with h/o HIV, T2DM, neurotoxoplasmosis, recurrent SBOs s/p two prior diagnostic laps admitted with recurrent SBO. Passing gas but with continued nausea, emesis, and tenderness. Awaiting full return of bowel function.    - NPO  - recommend NG tube. per patient if no BM and pain not resolved by noon he is agreeable to NG tube at that time  - enema   - surgery will follow    Discussed with chief resident, Dr. Sterling.    Adrien Lopez MD  Surgery resident  "

## 2021-07-14 NOTE — ED NOTES
RN prepped patient for NG insertion. Hurricane spray given, education completed. Right before RN inserted, patient refused NG. Attempted to re-educate importance of NG. Pt refused. MD Canela paged. Will continue to monitor.     Connie Melgoza RN

## 2021-07-14 NOTE — PLAN OF CARE
"BP (!) 140/80   Pulse 87   Temp 97.2  F (36.2  C) (Oral)   Resp 18   Ht 1.626 m (5' 4\")   Wt 74.8 kg (165 lb)   SpO2 99%   BMI 28.32 kg/m      Admitted/transferred from: ED @ 0655  2 RN full   skin assessment completed by Dalila Ying, RN and Becky SANCHEZ RN.  Skin assessment finding: issues found scabbing on bilateral lower extremities, dry feet    Interventions/actions: skin interventions pt educated on importance of repositioning in bed      Pt settled, VS stable, R PIV-/hr    "

## 2021-07-14 NOTE — PLAN OF CARE
Time: Day shift 7/14  Status: Stable, improving  NEURO: Alert and oriented, no deficits noted  RESPIRATORY: Lungs clear, O2 sats 98% RA  CARDIAC: HR 80s and regular  GI/: Abdomen soft, states he is passing gas. Voided 600cc clear, yellow urine in urinal.   DIET: NPO  PAIN/NAUSEA: Denies pain/nausea  INCISION/DRAIN: None  IV ACCESS: PIV with NS@ 125  ACTIVITY: Encouraged to walk but very tired and slept much of shift  PLAN: Pt would not take [place] [employer]ema from female nurse because of being Bahai. When male nurse ready to give he also refused at that time. Enema offered x4 this shift. Bahai male nurse will take patient this evening.

## 2021-07-14 NOTE — PROGRESS NOTES
[facility]    Medicine Progress Note - Hospitalist Service, Gold 6       Date of Admission:  7/13/2021    Assessment & Plan         Andrew Lockwood is a 55 year old male with PMHx HIV/AIDS on HAART therapy, CNS toxoplasmosis, Hx of Seizures, DM2, HLD, housing insecurity and several episodes of SBOs admitted on 7/13/2021, after presenting with abdominal pain, found to have small bowel obstruction.      #SBO: Hx of recurrent SBO s/p 2 laparoscopies with lysis of adhesions in the past (last 04/2018). Presented with abdominal pain, found to have SBO with transitional point at mid abdomen. Patient with stable pain, no nausea/vomiting today, passing gas but no BM. LA remains WNL. VSS.  -Surgery consulted & following, patient refused any surgical intervention, knowing risk could lead to death, currently refuses NG tube, but will re discuss after lunch noon  -Strict NPO and bowel rest   -Continue IVF at 125 ml/hr   -Pain control with IV morphine 2-4 mg Q3H PRN while NPO  -Anti emetics w/ Zofran and compazine PRN (QTc 440)   -Lactic acid Q12H, daily CMP, CBC     #HIV: Follows Dr. Prieto of [facility], last seen on 8/7/2020. Last CD4 count 6/15/2021 540 with undetectable viral load. Reports compliance with Biktarvy.   -Continue PTA Biktarvy -25 mg daily once able to tolerate PO      #DM2: Hemoglobin A1c 8.7 6/4/2018. PTA regimen with Glargine 35 Units BID, Aspart 1 unit per 25 mg/dL over 140 sliding scale TIDAC.  this AM.   -Continue decrease basal insulin by 50% while NPO w/ Glargine 15 Units BID  -Hold carb coverage while NPO  -POCT glucose Q4H and Medium sliding scale insulin Q4H   -Hypoglycemia protocol      #Housing insecurity: SW consult        Diet: NPO for Medical/Clinical Reasons Except for: No Exceptions    DVT Prophylaxis: Pneumatic Compression Devices and Ambulate every shift  Potts Catheter: Not present  Central Lines: None  Code Status: Full Code      Disposition  "Plan   Expected discharge: 2-3 days recommended to prior living arrangement once adequate pain management/ tolerating PO medications and safe disposition plan/ TCU bed available.     The patient's care was discussed with the Attending Physician, Dr. Silva, Bedside Nurse, Care Coordinator/, Patient and General Surgery Consultant.    Camila Aj PA-C  Hospitalist Service, [address]  Securely message with the Vocera Web Console (learn more here)  Text page via [employer] Paging/Directory  Please see sign in/sign out for up to date coverage information    Risk Factors Present on Admission                   ______________________________________________________________________    Interval History   The patient notes he has continued abdominal pain, mostly left sided, but also generalized. No nausea. Does feel bloated in upper abdomen like something is \"stuck\". No vomiting today, last vomited last evening. Has been passing gas. Resistant to NG tube as bad experience in the past. Notes no fevers/chills, URI symptoms, urinary symptoms, dyspnea, cough, edema, rashes. Overall feels fine with exception to abdomen.    Notes he is homeless, lives on the streets, does not often stay in shelters. Notes he is compliant with insulin and HIV meds. Missing appt w/ the [facility] today.     Data reviewed today: I reviewed all medications, new labs and imaging results over the last 24 hours. I personally reviewed no images or EKG's today.    Physical Exam   Vital Signs: Temp: 97.1  F (36.2  C) Temp src: Oral BP: 139/79 Pulse: 87   Resp: 18 SpO2: 98 % O2 Device: None (Room air)    Weight: 165 lbs 0 oz  GENERAL: Alert and oriented x 3. Lying comfortably in bed watching TV.   HEENT: Anicteric sclera. Mucous membranes moist and without lesions.   CV: RRR. S1, S2. No murmurs appreciated.   RESPIRATORY: Effort normal on RA. Lungs CTAB with no wheezing, " rales, rhonchi.   GI: Abdomen soft and non distended, bowel sounds present in 4 quadrants. LUQ TTP, no rebound, guarding.   MUSCULOSKELETAL: No joint swelling or tenderness. Moves all extremities.   NEUROLOGICAL: No focal deficits.   EXTREMITIES: No peripheral edema.   SKIN: No jaundice. No rashes.       Data   Reviewed CMP, CBC

## 2021-07-14 NOTE — H&P
[facility]    History and Physical - Hospitalist Service, Gold Night        Date of Admission:  7/13/2021    Assessment & Plan      Andrew Lockwood is a 55 year old male with PMHx HIV/AIDS on HAART therapy, CNS toxoplasmosis, Hx of Seizures, DM2, HLD, housing insecurity and several episodes of SBOs admitted on 7/13/2021, after presenting with abdominal pain, found to have small bowel obstruction.     #SBO - Hx of recurrent SBO s/p 2 laparoscopies with lysis of adhesions in the past. Presents with abdominal pain, found to have SBO with transitional point at mid abdomen. Surgery consulted, and patient refused any surgical intervention, knowing risk could lead to death. Patient now agreeable to NGT and surgery if necessary after discussion.   -Surgery consulted and to follow  -Strict NPO and bowel rest   -IVF at 125 ml/hr   -NGT to LIS   -IV morphine 2-4 mg Q3H PRN Pain, Zofran and compazine PRN (QTc 440)   -Lactic acid Q12H, daily CMP, CBC    #HIV - Follows Dr. Prieto of [facility], last seen on 8/7/2020. Last CD4 count 6/15/2021 540 with undetectable viral load. Reports compliance with Biktarvy.   -Continue PTA Biktarvy -25 mg daily once able to tolerate PO     #DM2 - Hemoglobin A1c 8.7 6/4/2018. PTA regimen with Glargine 35 Units BID, Aspart 1 unit per 25 mg/dL over 140 sliding scale TIDAC.   -Decrease basal insulin by 50% while NPO. Glargine 15 Units BID  -Hold prandial novolog   -POCT glucose Q4H and Medium sliding scale insulin Q4H   -Hypoglycemia protocol     #Housing insecurity - SW consult      Diet: NPO for Medical/Clinical Reasons Except for: No Exceptions    DVT Prophylaxis: Low Risk/Ambulatory with no VTE prophylaxis indicated  Potts Catheter: Not present  Central Lines: None  Code Status:   FULL CODE     Risk Factors Present on Admission                   Disposition Plan   Expected discharge: 4-7 days recommended to prior living arrangement once Resolution of  SBO.     The patient's care was discussed with the Attending Physician, Dr. Emil Berman, Bedside Nurse and Patient.    Lou Canela PA-C  [facility]  Securely message with the Vocera Web Console (learn more here)  Text page via [facility] Paging/Directory  Please see sign in/sign out for up to date coverage information    ______________________________________________________________________    Chief Complaint   Abdominal pain     History is obtained from the patient    History of Present Illness   Andrew Lockwood is a 55 year old male with PMHx HIV/AIDS on HAART therapy, CNS toxoplasmosis, Hx of Seizures, DM2, HLD, housing insecurity and several episodes of SBOs admitted on 7/13/2021, after presenting with abdominal pain, found to have small bowel obstruction. Patient refusing surgery, so admitted to medicine for medical management.     Patient states he developed constant crampy abdominal pain in mid abdomen in the early morning today, currently at 11/10 in severity. Associated with nausea, but no vomiting. Has not been able to tolerate any oral intake. Denies passing any gas, with last BM yesterday at 3 pm. Reports numerous SBO in the past, with 2 surgeries, last 2 years ago. Denies any fevers, CP, SOB, dysuria, hematuria, or blood in stool. Patient states he has been homeless, staying at a friends house on their couch. Reports compliance with insulin and HAART therapy. Patient initially refusing surgery, but discussed risk including necrotic bowel, which would leave him with a possible colostomy or death if not treated surgically. He initially declined a NGT due to prior bad experience with tube going down his airway. After discussion he was willing and agreeable to surgery if recommended from GS, and agreeable to NGT.     Review of Systems    The 10 point Review of Systems is negative other than noted in the HPI or here.     Past Medical History    I have reviewed  this patient's medical history and updated it with pertinent information if needed.   Past Medical History:   Diagnosis Date     AIDS (H)      Allergic rhinitis due to other allergen     DNS     Anal dysplasia      Chronic abdominal pain      CNS toxoplasmosis (H)      Diabetes type 2, controlled (H)      GERD (gastroesophageal reflux disease)      History of seizure      History of substance abuse (H)      HIV (human immunodeficiency virus infection) (H)      HLD (hyperlipidemia)      Lung nodules      Periungual wart      PTSD (post-traumatic stress disorder)      Sleep apnea     doesn't use CPAP       Past Surgical History   I have reviewed this patient's surgical history and updated it with pertinent information if needed.  Past Surgical History:   Procedure Laterality Date     ANOSCOPY N/A 9/9/2020    Procedure: Exam Under Anesthesia, ANOSCOPY, fulgeration of rectal fissures with Rectal Biopsies;  Surgeon: Thanh Lundberg MD;  Location: [facility] OR     COLONOSCOPY Left 1/22/2016    Procedure: COMBINED COLONOSCOPY, SINGLE OR MULTIPLE BIOPSY/POLYPECTOMY BY BIOPSY;  Surgeon: Clark Saini MD;  Location: [facility] GI     HC EXPLORE UNDESC TESTIS,INGUIN/SCROTAL       LAPAROSCOPIC APPENDECTOMY N/A 1/31/2018    Procedure: LAPAROSCOPIC APPENDECTOMY;  LAPAROSCOPIC APPENDECTOMY;  Surgeon: Dawn Holt MD;  Location: [facility] OR     LAPAROSCOPY DIAGNOSTIC (GENERAL) N/A 7/26/2016    Procedure: LAPAROSCOPY DIAGNOSTIC (GENERAL);  Surgeon: Susannah Arriaga MD;  Location: [facility] OR     LAPAROSCOPY DIAGNOSTIC (GENERAL) N/A 4/16/2018    Procedure: LAPAROSCOPY DIAGNOSTIC (GENERAL);  Diagnostic laparoscopy and lysis of adhesions;  Surgeon: Prince Dowling MD;  Location: [facility] OR     OPTICAL TRACKING SYSTEM CRANIOTOMY, EXCISE TUMOR, COMBINED Left 4/10/2015    Procedure: COMBINED OPTICAL TRACKING SYSTEM CRANIOTOMY, EXCISE TUMOR;  Surgeon: Mirlande Colmenares MD;  Location: [facility] OR     REPAIR GAMEKEEPER'S THUMB Right 12/2/2016     Procedure: REPAIR LIGAMENT ULNAR COLLATERAL THUMB (GAMEKEEPER'S);  Surgeon: Evin Zamorano MD;  Location:  OR     [facility] NONSPECIFIC PROCEDURE      right forearm fracture       Social History   I have reviewed this patient's social history and updated it with pertinent information if needed.  Social History     Tobacco Use     Smoking status: Current Every Day Smoker     Packs/day: 0.50     Years: 40.00     Pack years: 20.00     Types: Cigarettes     Smokeless tobacco: Former User   Substance Use Topics     Alcohol use: No     Alcohol/week: 0.0 standard drinks     Comment: Last etoh in 2007     Drug use: Not Currently     Types: Marijuana, Methamphetamines     Comment: Sober 9 months        Family History   I have reviewed this patient's family history and updated it with pertinent information if needed.  Family History   Problem Relation Age of Onset     Diabetes Brother      Diabetes Father      Alzheimer Disease Father      Unknown/Adopted Mother      Diabetes Paternal Grandfather      Cancer No family hx of         no skin cancer     Skin Cancer No family hx of         no famiy hx of skin cancer     Glaucoma No family hx of      Macular Degeneration No family hx of        Prior to Admission Medications   Prior to Admission Medications   Prescriptions Last Dose Informant Patient Reported? Taking?   Lancets (ONETOUCH DELICA PLUS [id number]) MISC  Self No No   Sig: USE TO TEST BLOOD SUGAR FOUR TIMES DAILY OR AS DIRECTED   NOVOLOG FLEXPEN 100 UNIT/ML soln 7/12/2021 at am Self No Yes   Sig: Inject 20 units with each meal, along with sliding scale 1 unit per 25mg/dL over 140 before meals and over 200 at bedtime. Max daily dose 90 units   SENNA-docusate sodium (SENNA S) 8.6-50 MG tablet Not Taking at Unknown time Self No No   Sig: Take 1 tablet by mouth 2 times daily Hold for loose stools.   Patient not taking: Reported on 7/13/2021   STATIN NOT PRESCRIBED (INTENTIONAL)  Self No No   Sig: Please choose reason  not prescribed, below   alcohol swab prep pads  Self No No   Sig: Use to swab area of injection/todd as directed.   bictegravir-emtricitabine-tenofovir (BIKTARVY) -25 MG per tablet 2021 at am Self No Yes   Sig: Take 1 tablet by mouth daily   blood glucose (NO BRAND SPECIFIED) lancets standard  Self No No   Sig: Use to test blood sugar four times daily or as directed.   blood glucose (NO BRAND SPECIFIED) test strip  Self No No   Si strip by In Vitro route 4 times daily (before meals and nightly) Use to test blood sugars 4 times daily or as directed   blood glucose (NO BRAND SPECIFIED) test strip  Self No No   Sig: Use to test blood sugar 4 times daily or as directed. To accompany:whatever is covered   blood glucose (NO BRAND SPECIFIED) test strip  Self No No   Sig: Use to test blood sugar 4-6 times daily or as directed. To accompany: Blood Glucose Monitor Brands: per insurance.   blood glucose calibration (NO BRAND SPECIFIED) solution  Self No No   Sig: To accompany: whatever is covered   blood glucose calibration (NO BRAND SPECIFIED) solution  Self No No   Sig: To accompany: Blood Glucose Monitor Brands: per insurance.   blood glucose monitoring (NO BRAND SPECIFIED) meter device kit  Self No No   Sig: Use to test blood sugar 4 times daily or as directed.   blood glucose monitoring (NO BRAND SPECIFIED) meter device kit  Self No No   Sig: Use to test blood sugar 4 times daily or as directed. Preferred blood glucose meter OR supplies to accompany: whatever is covered   blood glucose monitoring (NO BRAND SPECIFIED) meter device kit  Self No No   Sig: Use to test blood sugar 4-6 times daily or as directed. Preferred blood glucose meter OR supplies to accompany: Blood Glucose Monitor Brands: per insurance.   famotidine (PEPCID) 20 MG tablet 2021 at am Self No Yes   Sig: Take 1 tablet (20 mg) by mouth 2 times daily   insulin glargine (BASAGLAR KWIKPEN) 100 UNIT/ML pen Not Taking at Unknown time Self No  "No   Sig: Inject 35 Units Subcutaneous 2 times daily   Patient not taking: Reported on 7/13/2021   insulin glargine (LANTUS SOLOSTAR) 100 UNIT/ML pen 7/12/2021 at am Self No Yes   Sig: Inject 35 Units Subcutaneous 2 times daily   insulin pen needle (B-D U/F) 31G X 8 MM miscellaneous  Self No No   Sig: Use 1 pen needles daily or as directed.   insulin pen needle (BD SCOTT U/F) 32G X 4 MM miscellaneous  Self No No   Sig: USE 3 TO 4 NEEDLES DAILY   thin (NO BRAND SPECIFIED) lancets  Self No No   Sig: Use with lanceting device. To accompany: Test 4 times daily with whatever is covered   thin (NO BRAND SPECIFIED) lancets  Self No No   Sig: Use with lanceting device. To accompany: Blood Glucose Monitor Brands: per insurance.      Facility-Administered Medications: None     Allergies   Allergies   Allergen Reactions     Metformin      Abdominal pain     Milk [Lac Bovis] Hives     Tylenol [Acetaminophen] Itching     Dulaglutide Rash     Penicillin V Other (See Comments) and Rash     Diffuse maculopapular rash + feels \"high\", per pt.        Physical Exam   Vital Signs: Temp: 97.8  F (36.6  C) Temp src: Oral BP: (!) 147/92 Pulse: 87   Resp: 18 SpO2: 100 % O2 Device: None (Room air)    Weight: 165 lbs 0 oz  GENERAL: Alert and oriented x 3. Appears uncomfortable in bed. Pleasant and conversational.  HEENT: AT/NC. Anicteric sclera. Mucous membranes moist   CARDIOVASCULAR: RRR. S1, S2. No murmurs, rubs, or gallops.   RESPIRATORY: Effort normal on RA. Clear to auscultation bilaterally, no rales, rhonchi or wheezes, respirations unlabored   GI: Abdomen soft, TTP in LMQ and umbilicus abdomen without rebound or guarding, no BS heard on exam.   EXTREMITIES: No peripheral edema. Intact bilateral pedal pulses. No calf asymmetry, erythema, or tenderness.   NEUROLOGICAL: CN II-XII grossly intact. Moving all extremities symmetrically.   SKIN: Intact. Warm and dry. No jaundice.     Data   Data reviewed today: I reviewed all medications, new " labs and imaging results over the last 24 hours.     Recent Labs   Lab 07/13/21  1100   WBC 8.6   HGB 14.5   MCV 88         POTASSIUM 3.4   CHLORIDE 103   CO2 26   BUN 17   CR 0.91   ANIONGAP 7   LINDA 9.2   *   ALBUMIN 3.9   PROTTOTAL 7.4   BILITOTAL 1.4*   ALKPHOS 129   ALT 24   AST 18     Recent Results (from the past 24 hour(s))   CT Abdomen Pelvis w Contrast    Narrative    EXAMINATION: CT ABDOMEN PELVIS W CONTRAST, 7/13/2021 1:36 PM    TECHNIQUE:  Helical CT images from the lung bases through the  symphysis pubis were obtained with IV contrast. Contrast dose:  iopamidol (ISOVUE-370) solution 101 mL    COMPARISON: 1/10/2021 CT    HISTORY: Abdominal distension; Left mid abd pain, hx of bowel  obstructions.    FINDINGS:    Abdomen and pelvis: Dilated segments of mid small bowel in the left  midabdomen with mild perienteric fat stranding along the mesenteric  aspect of the most dilated segment (series 5 image 217). The bowel  tapers fairly abruptly in the anterior central abdomen. Fecalization  of intraluminal contents upstream of the transition point. The  downstream small bowel and colon are decompressed. Surgically absent  appendix. No pneumatosis, portal venous gas, or free air.    Focal fat deposition along the calcified ligament. No focal suspicious  hepatic lesion. Single tiny calcified stone in the gallbladder lumen.  No gallbladder wall thickening or pericholecystic fluid. The intra and  extrahepatic biliary tree, pancreas, spleen, adrenal glands, and renal  cortices are normal. No hydronephrosis, hydroureter, or urinary tract  stone. Normal bladder. Partially visualized penile implant with  reservoir in the anterior left pelvis adjacent to the bladder. Mildly  enlarged prostate. Symmetric seminal vesicles. No free fluid in the  pelvis. The major abdominal vasculature is patent. No abdominal or  pelvic lymphadenopathy.    Lung bases/lower chest:  Clear.    Bones and soft tissues: No  acute or aggressive osseus abnormality.      Impression    IMPRESSION: Mid small bowel obstruction with transition point in the  anterior central abdomen, likely adhesive. No pneumatosis, portal  venous gas, or free air.     JOSE VITAL DO         SYSTEM ID:  [id number]

## 2021-07-15 LAB
ANION GAP SERPL CALCULATED.3IONS-SCNC: 5 MMOL/L (ref 3–14)
BUN SERPL-MCNC: 8 MG/DL (ref 7–30)
CALCIUM SERPL-MCNC: 8.5 MG/DL (ref 8.5–10.1)
CHLORIDE BLD-SCNC: 106 MMOL/L (ref 94–109)
CO2 SERPL-SCNC: 25 MMOL/L (ref 20–32)
CREAT SERPL-MCNC: 0.74 MG/DL (ref 0.66–1.25)
ERYTHROCYTE [DISTWIDTH] IN BLOOD BY AUTOMATED COUNT: 12.6 % (ref 10–15)
GFR SERPL CREATININE-BSD FRML MDRD: >90 ML/MIN/1.73M2
GLUCOSE BLD-MCNC: 213 MG/DL (ref 70–99)
GLUCOSE BLDC GLUCOMTR-MCNC: 123 MG/DL (ref 70–99)
GLUCOSE BLDC GLUCOMTR-MCNC: 183 MG/DL (ref 70–99)
GLUCOSE BLDC GLUCOMTR-MCNC: 210 MG/DL (ref 70–99)
GLUCOSE BLDC GLUCOMTR-MCNC: 222 MG/DL (ref 70–99)
GLUCOSE BLDC GLUCOMTR-MCNC: 226 MG/DL (ref 70–99)
GLUCOSE BLDC GLUCOMTR-MCNC: 286 MG/DL (ref 70–99)
HCT VFR BLD AUTO: 37.6 % (ref 40–53)
HGB BLD-MCNC: 12.5 G/DL (ref 13.3–17.7)
LACTATE SERPL-SCNC: 1.2 MMOL/L (ref 0.7–2)
MCH RBC QN AUTO: 29.6 PG (ref 26.5–33)
MCHC RBC AUTO-ENTMCNC: 33.2 G/DL (ref 31.5–36.5)
MCV RBC AUTO: 89 FL (ref 78–100)
PLATELET # BLD AUTO: 293 10E3/UL (ref 150–450)
POTASSIUM BLD-SCNC: 3.5 MMOL/L (ref 3.4–5.3)
RBC # BLD AUTO: 4.23 10E6/UL (ref 4.4–5.9)
SODIUM SERPL-SCNC: 136 MMOL/L (ref 133–144)
WBC # BLD AUTO: 5.6 10E3/UL (ref 4–11)

## 2021-07-15 PROCEDURE — 36415 COLL VENOUS BLD VENIPUNCTURE: CPT | Performed by: PHYSICIAN ASSISTANT

## 2021-07-15 PROCEDURE — 80048 BASIC METABOLIC PNL TOTAL CA: CPT | Performed by: PHYSICIAN ASSISTANT

## 2021-07-15 PROCEDURE — 250N000011 HC RX IP 250 OP 636: Performed by: PHYSICIAN ASSISTANT

## 2021-07-15 PROCEDURE — 85027 COMPLETE CBC AUTOMATED: CPT | Performed by: PHYSICIAN ASSISTANT

## 2021-07-15 PROCEDURE — 99233 SBSQ HOSP IP/OBS HIGH 50: CPT | Performed by: PEDIATRICS

## 2021-07-15 PROCEDURE — 99207 PR APP CREDIT; MD BILLING SHARED VISIT: CPT | Performed by: PHYSICIAN ASSISTANT

## 2021-07-15 PROCEDURE — 83605 ASSAY OF LACTIC ACID: CPT | Performed by: PHYSICIAN ASSISTANT

## 2021-07-15 PROCEDURE — 120N000002 HC R&B MED SURG/OB UMMC

## 2021-07-15 RX ORDER — KETOROLAC TROMETHAMINE 15 MG/ML
15-30 INJECTION, SOLUTION INTRAMUSCULAR; INTRAVENOUS EVERY 6 HOURS PRN
Status: DISCONTINUED | OUTPATIENT
Start: 2021-07-15 | End: 2021-07-17 | Stop reason: HOSPADM

## 2021-07-15 RX ORDER — NALOXONE HYDROCHLORIDE 0.4 MG/ML
0.2 INJECTION, SOLUTION INTRAMUSCULAR; INTRAVENOUS; SUBCUTANEOUS
Status: DISCONTINUED | OUTPATIENT
Start: 2021-07-15 | End: 2021-07-17 | Stop reason: HOSPADM

## 2021-07-15 RX ORDER — OXYCODONE HYDROCHLORIDE 5 MG/1
5 TABLET ORAL EVERY 4 HOURS PRN
Status: DISCONTINUED | OUTPATIENT
Start: 2021-07-15 | End: 2021-07-17 | Stop reason: HOSPADM

## 2021-07-15 RX ORDER — NALOXONE HYDROCHLORIDE 0.4 MG/ML
0.4 INJECTION, SOLUTION INTRAMUSCULAR; INTRAVENOUS; SUBCUTANEOUS
Status: DISCONTINUED | OUTPATIENT
Start: 2021-07-15 | End: 2021-07-17 | Stop reason: HOSPADM

## 2021-07-15 RX ORDER — ACETAMINOPHEN 325 MG/10.15ML
975 LIQUID ORAL EVERY 8 HOURS PRN
Status: DISCONTINUED | OUTPATIENT
Start: 2021-07-15 | End: 2021-07-15

## 2021-07-15 RX ADMIN — INSULIN ASPART 2 UNITS: 100 INJECTION, SOLUTION INTRAVENOUS; SUBCUTANEOUS at 04:49

## 2021-07-15 RX ADMIN — FAMOTIDINE 20 MG: 20 INJECTION, SOLUTION INTRAVENOUS at 01:03

## 2021-07-15 RX ADMIN — INSULIN ASPART 2 UNITS: 100 INJECTION, SOLUTION INTRAVENOUS; SUBCUTANEOUS at 17:07

## 2021-07-15 RX ADMIN — INSULIN ASPART 3 UNITS: 100 INJECTION, SOLUTION INTRAVENOUS; SUBCUTANEOUS at 12:22

## 2021-07-15 RX ADMIN — KETOROLAC TROMETHAMINE 30 MG: 15 INJECTION, SOLUTION INTRAMUSCULAR; INTRAVENOUS at 13:52

## 2021-07-15 RX ADMIN — INSULIN ASPART 2 UNITS: 100 INJECTION, SOLUTION INTRAVENOUS; SUBCUTANEOUS at 08:14

## 2021-07-15 RX ADMIN — FAMOTIDINE 20 MG: 20 INJECTION, SOLUTION INTRAVENOUS at 12:20

## 2021-07-15 ASSESSMENT — ACTIVITIES OF DAILY LIVING (ADL)
ADLS_ACUITY_SCORE: 14

## 2021-07-15 NOTE — PROGRESS NOTES
"Writer walked into pt's room to witness pt eating esau crackers while still being on a clear liquid diet. Writer uninformed on where pt received crackers. Writer informed pt that this food was not covered by is diet order. Pt then stated \"you guys make the rules, and I break the rules.\" Will continue to monitor and compliance education.   "

## 2021-07-15 NOTE — PROGRESS NOTES
[facility]    Medicine Progress Note - Hospitalist Service, Gold 6       Date of Admission:  7/13/2021    Assessment & Plan         Andrew Lockwood is a 55 year old male with PMHx HIV/AIDS on HAART therapy, CNS toxoplasmosis, Hx of Seizures, DM2, HLD, housing insecurity and several episodes of SBOs admitted on 7/13/2021, after presenting with abdominal pain, found to have small bowel obstruction.      #SBO, improved: Hx of recurrent SBO s/p 2 laparoscopies with lysis of adhesions in the past (last 04/2018). Presented with abdominal pain, found to have SBO with transitional point at mid abdomen. Patient with stable pain, no nausea/vomiting today, passing gas, had BMs last evening.  -Surgery consulted & following, patient refused any surgical intervention, knowing risk could lead to death, currently refuses NG tube  -Patient w/ worsening pain after esau crackers, will not advance past clears, low threshold to make NPO if any worsening pain or nausea  -Discontinue IVF at 125 ml/hr   -Pain control with PRN toradol per patient preference, if worsening  -Anti emetics w/ Zofran and compazine PRN (QTc 440)   -Daily labs     #HIV: Follows Dr. Prieto of [facility], last seen on 8/7/2020. Last CD4 count 6/15/2021 540 with undetectable viral load. Reports compliance with Biktarvy.   -Continue PTA Biktarvy -25 mg daily once able to tolerate PO      #DM2: Hemoglobin A1c 8.7 6/4/2018. PTA regimen with Glargine 35 Units BID, Aspart 1 unit per 25 mg/dL over 140 sliding scale TIDAC.  this AM.   -Refuses lantus as believes this contributes to abdominal pain, discussed with endocrine, will trial Levemir BID to see if this improves pain  -Hold carb coverage while NPO  -POCT glucose Q4H and Medium sliding scale insulin Q4H   -Hypoglycemia protocol      #Housing insecurity: SW consult        Diet: Clear Liquid Diet    DVT Prophylaxis: Pneumatic Compression Devices and Ambulate every  shift  Potts Catheter: Not present  Central Lines: None  Code Status: Full Code      Disposition Plan   Expected discharge: tomorrow recommended to prior living arrangement once pain well controlled, SBO improved and PO intake improved.     The patient's care was discussed with the Attending Physician, Dr. Silva, Bedside Nurse, Care Coordinator/ and Patient.    Camila Aj PA-C  Hospitalist Service, [address]  Securely message with the Vocera Web Console (learn more here)  Text page via [facility] Paging/Directory  Please see sign in/sign out for up to date coverage information    Risk Factors Present on Admission               ______________________________________________________________________    Interval History   The patient notes he was feeling better overnight, had multiple BMs last one was last evening. Notes no nausea/vomitng. Had esau crackers this AM w/ some worsening fullness. Would like to continue clears.     Data reviewed today: I reviewed all medications, new labs and imaging results over the last 24 hours. I personally reviewed no images or EKG's today.    Physical Exam   Vital Signs: Temp: 97.3  F (36.3  C) Temp src: Oral BP: 123/69 Pulse: 95   Resp: 16 SpO2: 98 % O2 Device: None (Room air)    Weight: 165 lbs 0 oz  GENERAL: Alert and oriented x 3. Lying comfortably in bed sleeping.   HEENT: Anicteric sclera. Mucous membranes moist and without lesions.   CV: RRR. S1, S2. No murmurs appreciated.   RESPIRATORY: Effort normal on RA. Lungs CTAB with no wheezing, rales, rhonchi.   GI: Abdomen soft and non distended, bowel sounds present. No tenderness, rebound, guarding.   MUSCULOSKELETAL: No joint swelling or tenderness. Moves all extremities.   NEUROLOGICAL: No focal deficits.   EXTREMITIES: No peripheral edema.   SKIN: No jaundice. No rashes.       Data   Reviewed BG, BMP, CBC

## 2021-07-15 NOTE — PLAN OF CARE
"/80 (BP Location: Left arm)   Pulse 84   Temp 97.4  F (36.3  C) (Oral)   Resp 16   Ht 1.626 m (5' 4\")   Wt 74.8 kg (165 lb)   SpO2 97%   BMI 28.32 kg/m          Time: 1458-5326    Reason for admission:Small Bowel Obstruction  Activity: Independent  Pain: Abdominal pain controlled with Morphine  Neuro: A&O x4  Cardiac: WNL  Respiratory:  WDL  GI/: Voiding adequately, agreed for pink lady and had a total of 4 BM this shift.  Lines: PIV with NS @125/hr  Incisions/Drains/Skin:None  Lab: Reviewed  Diet:hanges to clear liquid  New changes this shift: patient stating he will leave if unable to eat. on call notified and diet changed to clear liquid. patient requesting crackers. informed that is not on diet. patient states \"I have friends here who will help me. Patient left room twie and writer found esau duran wraping in room and oon bed.  Plan: Patient appears to rest between cares. Cont with POC    "

## 2021-07-15 NOTE — PROVIDER NOTIFICATION
"Page to [phone] ([place] Cover)   \"Fabián [phone]  Pt is currently threatening to leave AMA if not put on a diet. Received Pink Lady w/ 2 BMs thereafter. Can he be on diet? If not, can you come talk to him? Thanks!  Stella [phone]\"  "

## 2021-07-15 NOTE — PLAN OF CARE
"/70 (BP Location: Left arm)   Pulse 83   Temp 97.4  F (36.3  C) (Oral)   Resp 18   Ht 1.626 m (5' 4\")   Wt 74.8 kg (165 lb)   SpO2 98%   BMI 28.32 kg/m      Neuro: A&O x4  Respiratory: Sats well on RA, denies SOB.   Cardiac: WDL, denies cardiac chest pain.   GI/: Voiding adequate amounts. LBM 0300 today, +flatus.   Diet: clears, noncompliant.   Pain/Nausea: prn toradol given for pain. Denies nausea.   Incisions/Drains: none  IV Access: PIV TKO  Labs: , 286, and 226  Activity: independent  Plan: follow POC.       "

## 2021-07-15 NOTE — PROGRESS NOTES
"[facility] Surgery Progress Note  07/15/2021       Subjective:  Patient declined NG tube yesterday. This morning, he reports that he is \"good\". No flatus but passed 4  BMs after enemas yesterday. His pain is improved. On a CLD, but ate some esau crackers yesterday which he tolerated well. Voiding and ambulating adequately.     Objective:  /80 (BP Location: Left arm)   Pulse 84   Temp 97.4  F (36.3  C) (Oral)   Resp 16   Ht 1.626 m (5' 4\")   Wt 74.8 kg (165 lb)   SpO2 97%   BMI 28.32 kg/m      I/O last 3 completed shifts:  In: 0   Out: 600 [Urine:600]      Gen: Awake, alert, NAD  Resp: NLB on RA  Abd: soft, nondistended, mild tenderness to left abdomen  Ext: WWP, no edema     Labs:  Lactate 0.6  Glucose 222, 183, 146     Assessment/Plan:   Andrew Lockwood is a 55 y.o. male with h/o HIV, T2DM, neurotoxoplasmosis, recurrent SBOs s/p two prior diagnostic laps admitted with recurrent SBO. He has now had return of bowel function and is tolerating oral intake. Pain is improved. Signs consistent with resolved SBO.      - recommend enemas PRN  - ADAT to regular diet  - remainder of cares per primary team  - surgery will sign off, please call with any questions or concerns  - does not need surgery follow up       Seen, examined, and discussed with chief resident, Dr. Sterling, who will discuss with staff.  - - - - - - - - - - - - - - - - - -  Doris Mcintyre MD  General Surgery PGY-1  [phone]    "

## 2021-07-16 LAB
ANION GAP SERPL CALCULATED.3IONS-SCNC: 6 MMOL/L (ref 3–14)
BUN SERPL-MCNC: 7 MG/DL (ref 7–30)
CALCIUM SERPL-MCNC: 8.5 MG/DL (ref 8.5–10.1)
CHLORIDE BLD-SCNC: 106 MMOL/L (ref 94–109)
CO2 SERPL-SCNC: 25 MMOL/L (ref 20–32)
CREAT SERPL-MCNC: 0.75 MG/DL (ref 0.66–1.25)
ERYTHROCYTE [DISTWIDTH] IN BLOOD BY AUTOMATED COUNT: 12.8 % (ref 10–15)
GFR SERPL CREATININE-BSD FRML MDRD: >90 ML/MIN/1.73M2
GLUCOSE BLD-MCNC: 309 MG/DL (ref 70–99)
GLUCOSE BLDC GLUCOMTR-MCNC: 135 MG/DL (ref 70–99)
GLUCOSE BLDC GLUCOMTR-MCNC: 260 MG/DL (ref 70–99)
GLUCOSE BLDC GLUCOMTR-MCNC: 275 MG/DL (ref 70–99)
GLUCOSE BLDC GLUCOMTR-MCNC: 308 MG/DL (ref 70–99)
GLUCOSE BLDC GLUCOMTR-MCNC: 309 MG/DL (ref 70–99)
GLUCOSE BLDC GLUCOMTR-MCNC: 313 MG/DL (ref 70–99)
HCT VFR BLD AUTO: 37.6 % (ref 40–53)
HGB BLD-MCNC: 12.7 G/DL (ref 13.3–17.7)
MCH RBC QN AUTO: 29.5 PG (ref 26.5–33)
MCHC RBC AUTO-ENTMCNC: 33.8 G/DL (ref 31.5–36.5)
MCV RBC AUTO: 87 FL (ref 78–100)
PLATELET # BLD AUTO: 314 10E3/UL (ref 150–450)
POTASSIUM BLD-SCNC: 3.4 MMOL/L (ref 3.4–5.3)
RBC # BLD AUTO: 4.3 10E6/UL (ref 4.4–5.9)
SODIUM SERPL-SCNC: 137 MMOL/L (ref 133–144)
WBC # BLD AUTO: 6.5 10E3/UL (ref 4–11)

## 2021-07-16 PROCEDURE — 80048 BASIC METABOLIC PNL TOTAL CA: CPT | Performed by: PHYSICIAN ASSISTANT

## 2021-07-16 PROCEDURE — 99233 SBSQ HOSP IP/OBS HIGH 50: CPT | Performed by: PEDIATRICS

## 2021-07-16 PROCEDURE — 36415 COLL VENOUS BLD VENIPUNCTURE: CPT | Performed by: PHYSICIAN ASSISTANT

## 2021-07-16 PROCEDURE — 85014 HEMATOCRIT: CPT | Performed by: PHYSICIAN ASSISTANT

## 2021-07-16 PROCEDURE — 120N000002 HC R&B MED SURG/OB UMMC

## 2021-07-16 PROCEDURE — 250N000013 HC RX MED GY IP 250 OP 250 PS 637: Performed by: PHYSICIAN ASSISTANT

## 2021-07-16 PROCEDURE — 250N000011 HC RX IP 250 OP 636: Performed by: PHYSICIAN ASSISTANT

## 2021-07-16 PROCEDURE — 250N000012 HC RX MED GY IP 250 OP 636 PS 637: Performed by: PHYSICIAN ASSISTANT

## 2021-07-16 PROCEDURE — 99207 PR APP CREDIT; MD BILLING SHARED VISIT: CPT | Performed by: PHYSICIAN ASSISTANT

## 2021-07-16 RX ADMIN — INSULIN DETEMIR 15 UNITS: 100 INJECTION, SOLUTION SUBCUTANEOUS at 07:57

## 2021-07-16 RX ADMIN — BICTEGRAVIR SODIUM, EMTRICITABINE, AND TENOFOVIR ALAFENAMIDE FUMARATE 1 TABLET: 50; 200; 25 TABLET ORAL at 07:57

## 2021-07-16 RX ADMIN — INSULIN ASPART 4 UNITS: 100 INJECTION, SOLUTION INTRAVENOUS; SUBCUTANEOUS at 07:57

## 2021-07-16 RX ADMIN — INSULIN ASPART 4 UNITS: 100 INJECTION, SOLUTION INTRAVENOUS; SUBCUTANEOUS at 19:57

## 2021-07-16 RX ADMIN — INSULIN ASPART 4 UNITS: 100 INJECTION, SOLUTION INTRAVENOUS; SUBCUTANEOUS at 23:34

## 2021-07-16 RX ADMIN — FAMOTIDINE 20 MG: 20 INJECTION, SOLUTION INTRAVENOUS at 23:33

## 2021-07-16 RX ADMIN — FAMOTIDINE 20 MG: 20 INJECTION, SOLUTION INTRAVENOUS at 12:01

## 2021-07-16 RX ADMIN — FAMOTIDINE 20 MG: 20 INJECTION, SOLUTION INTRAVENOUS at 01:10

## 2021-07-16 RX ADMIN — INSULIN ASPART 3 UNITS: 100 INJECTION, SOLUTION INTRAVENOUS; SUBCUTANEOUS at 12:05

## 2021-07-16 ASSESSMENT — ACTIVITIES OF DAILY LIVING (ADL)
ADLS_ACUITY_SCORE: 12

## 2021-07-16 NOTE — PROGRESS NOTES
[facility]    Medicine Progress Note - Hospitalist Service, Gold 6       Date of Admission:  7/13/2021    Assessment & Plan         Andrew Lockwood is a 55 year old male with PMHx HIV/AIDS on HAART therapy, CNS toxoplasmosis, Hx of Seizures, DM2, HLD, housing insecurity and several episodes of SBOs admitted on 7/13/2021, after presenting with abdominal pain, found to have small bowel obstruction.      #SBO, improved: Hx of recurrent SBO s/p 2 laparoscopies with lysis of adhesions in the past (last 04/2018). Presented with abdominal pain, found to have SBO with transitional point at mid abdomen. Patient with stable pain, no nausea/vomiting today, passing gas, had BM early this morning  -Surgery consulted, patient refused any surgical intervention, knowing risk could lead to death, currently refuses NG tube  -Patient requesting diet advancement, given BM and symptom improvement, will trial advancement of diet, if worsening pain/nausea/vomiting notify medicine to make NPO  -Pain control with PRN toradol per patient preference  -Anti emetics w/ Zofran and compazine PRN (QTc 440)   -Daily labs     #HIV: Follows Dr. Prieto of [facility], last seen on 8/7/2020. Last CD4 count 6/15/2021 540 with undetectable viral load. Reports compliance with Biktarvy.   -Continue PTA Biktarvy -25 mg daily once able to tolerate PO      #DM2: Hemoglobin A1c 8.7 6/4/2018. PTA regimen with Glargine 35 Units BID, Aspart 1 unit per 25 mg/dL over 140 sliding scale TIDAC. BG in 200s-300s today, intermittently refusing insulin.   -Trial Levemir BID & increase to 25 units given poor control, again reiterated to patient this is a different formulation than lantus  -Hold carb coverage while NPO  -POCT glucose Q4H and Medium sliding scale insulin Q4H   -Hypoglycemia protocol      #Housing insecurity: SW consult        Diet: Clear Liquid Diet    DVT Prophylaxis: Pneumatic Compression Devices and Ambulate every  shift  Potts Catheter: Not present  Central Lines: None  Code Status: Full Code      Disposition Plan   Expected discharge: 1-2 days recommended to prior living arrangement once adequate pain management/ tolerating PO medications.     The patient's care was discussed with the Attending Physician, Dr. Silva, Bedside Nurse, Care Coordinator/ and Patient.    Camila Aj PA-C  Hospitalist Service, [address]  Securely message with the Vocera Web Console (learn more here)  Text page via [facility] Paging/Directory  Please see sign in/sign out for up to date coverage information    Risk Factors Present on Admission               ______________________________________________________________________    Interval History   The patient notes he wants to try eating more. Notes continued pain on right side of abdomen, but well controlled. Notes he is passing gas and had a BM at 0300. He denies nausea/vomiting. He is convinced insulin is causing his abdominal pain. Noted it is likely his obstruction, rediscussed adhesions and risks with patient.    Data reviewed today: I reviewed all medications, new labs and imaging results over the last 24 hours. I personally reviewed no images or EKG's today.    Physical Exam   Vital Signs: Temp: 96.8  F (36  C) Temp src: Axillary BP: 121/80 Pulse: 86   Resp: 16 SpO2: 98 % O2 Device: None (Room air)    Weight: 165 lbs 0 oz  GENERAL: Alert and oriented x 3. Lying comfortably in bed.   HEENT: Anicteric sclera. Mucous membranes moist and without lesions.   CV: RRR. S1, S2. No murmurs appreciated.   RESPIRATORY: Effort normal on RA. Lungs CTAB with no wheezing, rales, rhonchi.   GI: Abdomen soft and non distended, bowel sounds present. Left mid quadrant tenderness to palpation, no rebound, guarding.   MUSCULOSKELETAL: No joint swelling or tenderness. Moves all extremities.   NEUROLOGICAL: No focal deficits.    EXTREMITIES: No peripheral edema.   SKIN: No jaundice. No rashes.       Data   Reviewed BMP, CBC, glucose

## 2021-07-16 NOTE — PROGRESS NOTES
Reason for admission: abdominal pain / Small bowel obstruction.   Activity:  independent with self-care.   Pain:  Denies pain.   Neuro: Alert,call light appropriately,  able to make needs known.   Cardiac: WDL.  Respiratory: WNL  GI/:  Voids spontaneously.   Diet: Low fiber.   Lines:  Right PIV anterior upper forearm TKO.   Incisions/Drains/Skin: Lab:  BS q 4hr. patient refused BS checks.  New changes this shift:

## 2021-07-16 NOTE — PROGRESS NOTES
Vitals: Temp: 96.9  F (36.1  C) Temp src: Oral BP: 130/71 Pulse: 80   Resp: 18 SpO2: 98 % O2 Device: None (Room air)       Time: 0887-8462  Reason for admission: Small bowel obstruction.   Activity:  independent with self-care. Ambulates in the hallway.  Pain:  patient denies pain.   Neuro: Alert, able to make needs known. Uses call light appropriately.   Cardiac: WDL.  Respiratory:  Chest expansion symmetrical no cough.   GI/:  Voids spontaneously. One formed, brown BM reported to the nurse, visualized. Bowel sounds active all four quadrants. Soft, non tender.   Diet: Advance as tolerated, Low fiber.   Lines:  Right PIV anterior upper forearm TKO.   Incisions/Drains/Skin: Lab:  BS q 4hr.   New changes this shift: Patient ambulated in the hallway x2. Cooperative with cares and medication administration.

## 2021-07-16 NOTE — PLAN OF CARE
"[id number]    Neuro: A&O x4   Respiratory: WDL, RA, Clear lung sounds throughout   Cardiac: WDL  GI/: Pt voids spontaneously without difficulty. Pt. Stated he had a small brown BM earlier in the shift. His diet is clear liquid, but he goes around the hospital and finds esau crackers to eat. BG have been slightly elevated at times. He will sometimes refuses BG checks and Insulin. He is educated on why his BG levels are high and why he should be taking the insulin but states his \"my blood sugar is not high, I don't need to take it.\" He also states \"insulin hurts his stomach\"  Skin: WDL  Lines/Drains: LPIV TKO  Pain: Denied pain this shift  Vitals: Vss    Plan of care: Possible discharge once living arrangements are made, pain controlled, and improvement of SBO.  "

## 2021-07-17 VITALS
BODY MASS INDEX: 28.17 KG/M2 | SYSTOLIC BLOOD PRESSURE: 142 MMHG | OXYGEN SATURATION: 98 % | TEMPERATURE: 98.3 F | RESPIRATION RATE: 18 BRPM | WEIGHT: 165 LBS | HEIGHT: 64 IN | HEART RATE: 91 BPM | DIASTOLIC BLOOD PRESSURE: 80 MMHG

## 2021-07-17 LAB
ANION GAP SERPL CALCULATED.3IONS-SCNC: 4 MMOL/L (ref 3–14)
BUN SERPL-MCNC: 7 MG/DL (ref 7–30)
CALCIUM SERPL-MCNC: 9.4 MG/DL (ref 8.5–10.1)
CHLORIDE BLD-SCNC: 105 MMOL/L (ref 94–109)
CO2 SERPL-SCNC: 27 MMOL/L (ref 20–32)
CREAT SERPL-MCNC: 0.71 MG/DL (ref 0.66–1.25)
ERYTHROCYTE [DISTWIDTH] IN BLOOD BY AUTOMATED COUNT: 12.7 % (ref 10–15)
GFR SERPL CREATININE-BSD FRML MDRD: >90 ML/MIN/1.73M2
GLUCOSE BLD-MCNC: 249 MG/DL (ref 70–99)
GLUCOSE BLDC GLUCOMTR-MCNC: 238 MG/DL (ref 70–99)
GLUCOSE BLDC GLUCOMTR-MCNC: 251 MG/DL (ref 70–99)
GLUCOSE BLDC GLUCOMTR-MCNC: 270 MG/DL (ref 70–99)
GLUCOSE BLDC GLUCOMTR-MCNC: 281 MG/DL (ref 70–99)
GLUCOSE BLDC GLUCOMTR-MCNC: 291 MG/DL (ref 70–99)
GLUCOSE BLDC GLUCOMTR-MCNC: 346 MG/DL (ref 70–99)
HCT VFR BLD AUTO: 37.5 % (ref 40–53)
HGB BLD-MCNC: 12.9 G/DL (ref 13.3–17.7)
MCH RBC QN AUTO: 29.7 PG (ref 26.5–33)
MCHC RBC AUTO-ENTMCNC: 34.4 G/DL (ref 31.5–36.5)
MCV RBC AUTO: 86 FL (ref 78–100)
PLATELET # BLD AUTO: 326 10E3/UL (ref 150–450)
POTASSIUM BLD-SCNC: 3.4 MMOL/L (ref 3.4–5.3)
RBC # BLD AUTO: 4.35 10E6/UL (ref 4.4–5.9)
SODIUM SERPL-SCNC: 136 MMOL/L (ref 133–144)
WBC # BLD AUTO: 6.3 10E3/UL (ref 4–11)

## 2021-07-17 PROCEDURE — 250N000011 HC RX IP 250 OP 636: Performed by: PHYSICIAN ASSISTANT

## 2021-07-17 PROCEDURE — 80048 BASIC METABOLIC PNL TOTAL CA: CPT | Performed by: PHYSICIAN ASSISTANT

## 2021-07-17 PROCEDURE — 85027 COMPLETE CBC AUTOMATED: CPT | Performed by: PHYSICIAN ASSISTANT

## 2021-07-17 PROCEDURE — 99207 PR APP CREDIT; MD BILLING SHARED VISIT: CPT | Performed by: PHYSICIAN ASSISTANT

## 2021-07-17 PROCEDURE — 36415 COLL VENOUS BLD VENIPUNCTURE: CPT | Performed by: PHYSICIAN ASSISTANT

## 2021-07-17 PROCEDURE — 99239 HOSP IP/OBS DSCHRG MGMT >30: CPT | Performed by: PEDIATRICS

## 2021-07-17 PROCEDURE — 250N000013 HC RX MED GY IP 250 OP 250 PS 637: Performed by: PHYSICIAN ASSISTANT

## 2021-07-17 RX ORDER — LANCETS
EACH MISCELLANEOUS
Qty: 360 EACH | Refills: 3 | Status: SHIPPED | OUTPATIENT
Start: 2021-07-17 | End: 2022-08-31

## 2021-07-17 RX ORDER — LANCETS
EACH MISCELLANEOUS
Qty: 100 EACH | Refills: 6 | Status: SHIPPED | OUTPATIENT
Start: 2021-07-17 | End: 2021-09-21

## 2021-07-17 RX ORDER — NICOTINE POLACRILEX 4 MG
LOZENGE BUCCAL
Qty: 112.5 G | Refills: 0 | Status: ON HOLD | OUTPATIENT
Start: 2021-07-17 | End: 2022-05-29

## 2021-07-17 RX ORDER — CONTAINER,EMPTY
EACH MISCELLANEOUS
Qty: 1 EACH | Refills: 0 | Status: SHIPPED | OUTPATIENT
Start: 2021-07-17 | End: 2022-08-31

## 2021-07-17 RX ORDER — INSULIN ASPART 100 [IU]/ML
INJECTION, SOLUTION INTRAVENOUS; SUBCUTANEOUS
Qty: 75 ML | Refills: 0 | Status: SHIPPED | OUTPATIENT
Start: 2021-07-17 | End: 2021-07-17

## 2021-07-17 RX ORDER — GLUCOSAMINE HCL/CHONDROITIN SU 500-400 MG
CAPSULE ORAL
Qty: 100 EACH | Refills: 3 | Status: SHIPPED | OUTPATIENT
Start: 2021-07-17 | End: 2021-09-21

## 2021-07-17 RX ORDER — PEN NEEDLE, DIABETIC 32GX 5/32"
NEEDLE, DISPOSABLE MISCELLANEOUS
Qty: 400 EACH | Refills: 3 | Status: SHIPPED | OUTPATIENT
Start: 2021-07-17 | End: 2022-04-06

## 2021-07-17 RX ORDER — INSULIN ASPART 100 [IU]/ML
INJECTION, SOLUTION INTRAVENOUS; SUBCUTANEOUS
Qty: 75 ML | Refills: 0 | Status: ON HOLD | OUTPATIENT
Start: 2021-07-17 | End: 2021-09-09

## 2021-07-17 RX ORDER — PEN NEEDLE, DIABETIC 31 GX5/16"
NEEDLE, DISPOSABLE MISCELLANEOUS
Qty: 100 EACH | Refills: 3 | Status: SHIPPED | OUTPATIENT
Start: 2021-07-17 | End: 2021-09-21

## 2021-07-17 RX ADMIN — INSULIN ASPART 5 UNITS: 100 INJECTION, SOLUTION INTRAVENOUS; SUBCUTANEOUS at 17:02

## 2021-07-17 RX ADMIN — INSULIN ASPART 2 UNITS: 100 INJECTION, SOLUTION INTRAVENOUS; SUBCUTANEOUS at 08:06

## 2021-07-17 RX ADMIN — INSULIN ASPART 2 UNITS: 100 INJECTION, SOLUTION INTRAVENOUS; SUBCUTANEOUS at 11:53

## 2021-07-17 RX ADMIN — BICTEGRAVIR SODIUM, EMTRICITABINE, AND TENOFOVIR ALAFENAMIDE FUMARATE 1 TABLET: 50; 200; 25 TABLET ORAL at 08:06

## 2021-07-17 RX ADMIN — INSULIN ASPART 4 UNITS: 100 INJECTION, SOLUTION INTRAVENOUS; SUBCUTANEOUS at 04:36

## 2021-07-17 RX ADMIN — FAMOTIDINE 20 MG: 20 INJECTION, SOLUTION INTRAVENOUS at 11:55

## 2021-07-17 ASSESSMENT — ACTIVITIES OF DAILY LIVING (ADL)
ADLS_ACUITY_SCORE: 12

## 2021-07-17 NOTE — PLAN OF CARE
"/78 (BP Location: Left arm)   Pulse 92   Temp 97.8  F (36.6  C) (Oral)   Resp 18   Ht 1.626 m (5' 4\")   Wt 74.8 kg (165 lb)   SpO2 98%   BMI 28.32 kg/m      Shift time: 8918-6037      Neuros: A&Ox4. Calls appropriately. Cooperative this shift.   Cardiac: WDL. Denies any chest pain.   Respiratory: WDL. Denies SOB.   GI/Gu: Voiding spontaneously. LBM 7/16  Skin/Incisions: no new deficits. R PIV infusing TKO currently.   Pain: Denies any pain.   Diet: Low fiber diet.   Activity: Independent.   Plan: Continue with POC. BS check Q4H.     "

## 2021-07-17 NOTE — DISCHARGE SUMMARY
[facility]  Hospitalist Discharge Summary      Date of Admission:  7/13/2021  Date of Discharge:  7/17/2021  Discharging Provider: Camila Aj PA-C/Dr. Silva  Discharge Team: Hospitalist Service, Gold 6    Discharge Diagnoses   #SBO, resolved  #HIV  #DM2  #GERD    Follow-ups Needed After Discharge   Follow up with PCP w/ in 1 week for hospital follow up  Follow up with Endocrine within 2 weeks for continued diabetes management    Unresulted Labs Ordered in the Past 30 Days of this Admission     No orders found from 6/13/2021 to 7/14/2021.      These results will be followed up by PCP    Discharge Disposition   Discharged to street, homeless, see  notes for resource provided  Condition at discharge: Stable    Hospital Course   Andrew Lockwood is a 55 year old male with PMHx HIV/AIDS on HAART therapy, CNS toxoplasmosis, Hx of Seizures, DM2, HLD, housing insecurity and several episodes of SBOs admitted on 7/13/2021, after presenting with abdominal pain, found to have small bowel obstruction.      #SBO, improved: Hx of recurrent SBO s/p 2 laparoscopies with lysis of adhesions in the past (last 04/2018). Presented with abdominal pain, found to have SBO with transitional point at mid abdomen. Improved with time and pain management. On discharge patient with no pain, no nausea/vomiting, had BM this AM.      #HIV: Follows Dr. Prieto of [facility], last seen on 8/7/2020. Last CD4 count 6/15/2021 540 with undetectable viral load. Reports compliance with Biktarvy. Continue PTA Biktarvy -25 mg daily on discharge.      #DM2: Hemoglobin A1c 8.7 6/4/2018. PTA regimen with Glargine 35 Units BID, Aspart 1 unit per 25 mg/dL over 140 sliding scale TIDAC. BG in 200s-300s today, intermittently refusing insulin due to abdominal pain. Tolerated levemir without pain. Continue OP regimen w/ levemir as substitute for lantus. Prescribed glucometer on discharge. Should follow up with  endocrine w/in 2 weeks for continued diabetes management.      #Housing insecurity: SW consult     Consultations This Hospital Stay   VASCULAR ACCESS CARE ADULT IP CONSULT  SOCIAL WORK IP CONSULT  CARE MANAGEMENT / SOCIAL WORK IP CONSULT    Code Status   Full Code    Time Spent on this Encounter   I, Camila Aj PA-C, personally saw the patient today and spent greater than 30 minutes discharging this patient.       Camila Aj PA-C  [facility] [address] [address]  Phone: [phone]  ______________________________________________________________________    Physical Exam   Vital Signs: Temp: 97.9  F (36.6  C) Temp src: Oral BP: 137/87 Pulse: 83   Resp: 18 SpO2: 98 % O2 Device: None (Room air)    Weight: 165 lbs 0 oz  GENERAL: Alert and oriented x 3. Lying comfortably in bed.   HEENT: Anicteric sclera. Mucous membranes moist and without lesions.   CV: RRR. S1, S2. No murmurs appreciated.   RESPIRATORY: Effort normal on RA. Lungs CTAB with no wheezing, rales, rhonchi.   GI: Abdomen soft and non distended, bowel sounds present. No tenderness, rebound, guarding.   MUSCULOSKELETAL: Moves all extremities.   NEUROLOGICAL: No focal deficits.   EXTREMITIES: No peripheral edema.   SKIN: No jaundice. No rashes.          Primary Care Physician   Physician No Ref-Primary    Discharge Orders      Reason for your hospital stay    You were admitted for a bowel obstruction and this improved with bowel rest (not eating or drinking) and time.     Activity    Your activity upon discharge: activity as tolerated     Adult [facility] Follow-up and recommended labs and tests    Follow up with primary care provider, at [facility], within 7 days for hospital follow- up.  No follow up labs or test are needed.    Follow up with Endocrine , at (location with clinic name or city) [facility] endocrinology clinic, within 2 weeks for continued management of your diabetes. We changed your  lantus to Levemir which is a different long acting insulin and should not cause pain. We continued your novolog. Check your blood sugar 4 times daily (before meals and bedtime), notify endocrine if blood glucose is <70 and /or >250.     Appointments on [place] and/or [facility] (with [facility] or [facility] provider or service). Call [phone] if you haven't heard regarding these appointments within 7 days of discharge.     Diet    Follow this diet upon discharge: Orders Placed This Encounter      Advance Diet as Tolerated: Low Fiber       Significant Results and Procedures   Most Recent 3 CBC's:  Recent Labs   Lab Test 07/17/21  0746 07/16/21  0733 07/15/21  0925   WBC 6.3 6.5 5.6   HGB 12.9* 12.7* 12.5*   MCV 86 87 89    314 293     Most Recent 3 BMP's:  Recent Labs   Lab Test 07/17/21  1153 07/17/21  1009 07/17/21  0804 07/17/21  0746 07/16/21  0733 07/15/21  0925   NA  --   --   --  136 137 136   POTASSIUM  --   --   --  3.4 3.4 3.5   CHLORIDE  --   --   --  105 106 106   CO2  --   --   --  27 25 25   BUN  --   --   --  7 7 8   CR  --   --   --  0.71 0.75 0.74   ANIONGAP  --   --   --  4 6 5   LINDA  --   --   --  9.4 8.5 8.5   * 251* 238* 249* 309* 213*     Most Recent 2 LFT's:  Recent Labs   Lab Test 07/14/21  0727 07/13/21  1100   AST 14 18   ALT 18 24   ALKPHOS 92 129   BILITOTAL 1.6* 1.4*     Most Recent 3 INR's:  Recent Labs   Lab Test 12/19/20  2153 03/07/20  1107 02/14/20  1032   INR 1.00 1.12 1.04   ,   Results for orders placed or performed during the hospital encounter of 07/13/21   CT Abdomen Pelvis w Contrast    Narrative    EXAMINATION: CT ABDOMEN PELVIS W CONTRAST, 7/13/2021 1:36 PM    TECHNIQUE:  Helical CT images from the lung bases through the  symphysis pubis were obtained with IV contrast. Contrast dose:  iopamidol (ISOVUE-370) solution 101 mL    COMPARISON: 1/10/2021 CT    HISTORY: Abdominal distension; Left mid abd pain, hx of bowel  obstructions.    FINDINGS:    Abdomen and  pelvis: Dilated segments of mid small bowel in the left  midabdomen with mild perienteric fat stranding along the mesenteric  aspect of the most dilated segment (series 5 image 217). The bowel  tapers fairly abruptly in the anterior central abdomen. Fecalization  of intraluminal contents upstream of the transition point. The  downstream small bowel and colon are decompressed. Surgically absent  appendix. No pneumatosis, portal venous gas, or free air.    Focal fat deposition along the calcified ligament. No focal suspicious  hepatic lesion. Single tiny calcified stone in the gallbladder lumen.  No gallbladder wall thickening or pericholecystic fluid. The intra and  extrahepatic biliary tree, pancreas, spleen, adrenal glands, and renal  cortices are normal. No hydronephrosis, hydroureter, or urinary tract  stone. Normal bladder. Partially visualized penile implant with  reservoir in the anterior left pelvis adjacent to the bladder. Mildly  enlarged prostate. Symmetric seminal vesicles. No free fluid in the  pelvis. The major abdominal vasculature is patent. No abdominal or  pelvic lymphadenopathy.    Lung bases/lower chest:  Clear.    Bones and soft tissues: No acute or aggressive osseus abnormality.      Impression    IMPRESSION: Mid small bowel obstruction with transition point in the  anterior central abdomen, likely adhesive. No pneumatosis, portal  venous gas, or free air.     JOSE VITAL DO         SYSTEM ID:  [id number]     *Note: Due to a large number of results and/or encounters for the requested time period, some results have not been displayed. A complete set of results can be found in Results Review.       Discharge Medications   Current Discharge Medication List      START taking these medications    Details   !! blood glucose (NO BRAND SPECIFIED) lancets standard To use to test glucose level in the blood. Use to test blood sugar  4  times daily as directed. To accompany glucose monitor brands per  insurance coverage.  Qty: 100 each, Refills: 0    Associated Diagnoses: Type 2 diabetes mellitus with hyperglycemia, with long-term current use of insulin (H)      !! blood glucose (NO BRAND SPECIFIED) test strip To use to test glucose level in the blood. Use to test blood sugar 4 times daily as directed. To accompany glucose monitor brands per insurance coverage.  Qty: 200 strip, Refills: 0    Associated Diagnoses: Type 2 diabetes mellitus with hyperglycemia, with long-term current use of insulin (H)      !! blood glucose calibration (NO BRAND SPECIFIED) solution Used to calibrate the blood glucose monitor as needed and as directed.  To accompany  blood glucose brands per insurance coverage  Qty: 1 each, Refills: 0    Associated Diagnoses: Type 2 diabetes mellitus with hyperglycemia, with long-term current use of insulin (H)      !! blood glucose monitoring (NO BRAND SPECIFIED) meter device kit Use as directed. Per insurance coverage.  Qty: 1 kit, Refills: 0    Associated Diagnoses: Type 2 diabetes mellitus with hyperglycemia, with long-term current use of insulin (H)      glucose 40 % (400 mg/mL) gel 15 g every 15 minutes by mouth as needed for low blood sugar. Oral gel is preferable for conscious and able to swallow patient.  Qty: 112.5 g, Refills: 0    Associated Diagnoses: Type 2 diabetes mellitus with hyperglycemia, with long-term current use of insulin (H)      insulin detemir (LEVEMIR PEN) 100 UNIT/ML pen Inject 35 Units Subcutaneous 2 times daily for 4 days  Qty: 9 mL, Refills: 0    Associated Diagnoses: Type 2 diabetes mellitus with hyperglycemia, with long-term current use of insulin (H)      !! insulin pen needle (32G X 4 MM) 32G X 4 MM miscellaneous Use as directed by provider. Per insurance coverage  Qty: 100 each, Refills: 0    Associated Diagnoses: Type 2 diabetes mellitus with hyperglycemia, with long-term current use of insulin (H)      Sharps Container MISC Use as directed to dispose of needles,  lancets and other sharps. Per Insurance coverage  Qty: 1 each, Refills: 0    Associated Diagnoses: Type 2 diabetes mellitus with hyperglycemia, with long-term current use of insulin (H)       !! - Potential duplicate medications found. Please discuss with provider.      CONTINUE these medications which have CHANGED    Details   NOVOLOG FLEXPEN 100 UNIT/ML soln Inject 20 units with each meal, along with sliding scale 1 unit per 25mg/dL over 140 before meals and over 200 at bedtime. Max daily dose 90 units  Qty: 75 mL, Refills: 0    Associated Diagnoses: Type 2 diabetes mellitus with hyperglycemia, with long-term current use of insulin (H)         CONTINUE these medications which have NOT CHANGED    Details   bictegravir-emtricitabine-tenofovir (BIKTARVY) -25 MG per tablet Take 1 tablet by mouth daily  Qty: 30 tablet, Refills: 0    Associated Diagnoses: Human immunodeficiency virus (HIV) disease (H)      famotidine (PEPCID) 20 MG tablet Take 1 tablet (20 mg) by mouth 2 times daily  Qty: 30 tablet, Refills: 11    Associated Diagnoses: Gastroesophageal reflux disease without esophagitis      alcohol swab prep pads Use to swab area of injection/todd as directed.  Qty: 100 each, Refills: 3    Associated Diagnoses: Type 2 diabetes mellitus with hyperglycemia, with long-term current use of insulin (H)      !! blood glucose (NO BRAND SPECIFIED) lancets standard Use to test blood sugar four times daily or as directed.  Qty: 100 each, Refills: 0    Associated Diagnoses: Type 2 diabetes mellitus without complication, without long-term current use of insulin (H)      !! blood glucose (NO BRAND SPECIFIED) test strip Use to test blood sugar 4 times daily or as directed. To accompany:whatever is covered  Qty: 360 strip, Refills: 3    Associated Diagnoses: Type 2 diabetes mellitus with other specified complication, with long-term current use of insulin (H)      !! blood glucose (NO BRAND SPECIFIED) test strip Use to test  blood sugar 4-6 times daily or as directed. To accompany: Blood Glucose Monitor Brands: per insurance.  Qty: 100 strip, Refills: 6    Associated Diagnoses: Type 2 diabetes mellitus with hyperglycemia, with long-term current use of insulin (H)      !! blood glucose (NO BRAND SPECIFIED) test strip 1 strip by In Vitro route 4 times daily (before meals and nightly) Use to test blood sugars 4 times daily or as directed  Qty: 100 strip, Refills: 11    Associated Diagnoses: Type 2 diabetes mellitus with complication, without long-term current use of insulin (H)      !! blood glucose calibration (NO BRAND SPECIFIED) solution To accompany: whatever is covered  Qty: 1 Bottle, Refills: 3    Associated Diagnoses: Type 2 diabetes mellitus with other specified complication, with long-term current use of insulin (H)      !! blood glucose calibration (NO BRAND SPECIFIED) solution To accompany: Blood Glucose Monitor Brands: per insurance.  Qty: 1 Bottle, Refills: 0    Associated Diagnoses: Type 2 diabetes mellitus with hyperglycemia, with long-term current use of insulin (H)      !! blood glucose monitoring (NO BRAND SPECIFIED) meter device kit Use to test blood sugar 4 times daily or as directed. Preferred blood glucose meter OR supplies to accompany: whatever is covered  Qty: 1 kit, Refills: 0    Associated Diagnoses: Type 2 diabetes mellitus with other specified complication, with long-term current use of insulin (H)      !! blood glucose monitoring (NO BRAND SPECIFIED) meter device kit Use to test blood sugar 4-6 times daily or as directed. Preferred blood glucose meter OR supplies to accompany: Blood Glucose Monitor Brands: per insurance.  Qty: 1 kit, Refills: 0    Associated Diagnoses: Type 2 diabetes mellitus with hyperglycemia, with long-term current use of insulin (H)      !! blood glucose monitoring (NO BRAND SPECIFIED) meter device kit Use to test blood sugar 4 times daily or as directed.  Qty: 1 kit, Refills: 0     Comments: Please provide meter that is covered by insurance.  Associated Diagnoses: Type 2 diabetes mellitus with complication, without long-term current use of insulin (H)      !! insulin pen needle (B-D U/F) 31G X 8 MM miscellaneous Use 1 pen needles daily or as directed.  Qty: 100 each, Refills: 3    Associated Diagnoses: Type 2 diabetes mellitus without complication, without long-term current use of insulin (H)      !! insulin pen needle (BD SCOTT U/F) 32G X 4 MM miscellaneous USE 3 TO 4 NEEDLES DAILY  Qty: 400 each, Refills: 3    Associated Diagnoses: Type 2 diabetes mellitus with hyperglycemia, without long-term current use of insulin (H)      !! Lancets (ONETOUCH DELICA PLUS [id number]) MISC USE TO TEST BLOOD SUGAR FOUR TIMES DAILY OR AS DIRECTED  Qty: 100 each, Refills: 11    Associated Diagnoses: Type 2 diabetes mellitus without complication, without long-term current use of insulin (H)      SENNA-docusate sodium (SENNA S) 8.6-50 MG tablet Take 1 tablet by mouth 2 times daily Hold for loose stools.  Qty: 60 tablet, Refills: 5    Associated Diagnoses: Constipation, unspecified constipation type      STATIN NOT PRESCRIBED (INTENTIONAL) Please choose reason not prescribed, below    Associated Diagnoses: Type 2 diabetes mellitus with complication, without long-term current use of insulin (H)      !! thin (NO BRAND SPECIFIED) lancets Use with lanceting device. To accompany: Test 4 times daily with whatever is covered  Qty: 360 each, Refills: 3    Associated Diagnoses: Type 2 diabetes mellitus with other specified complication, with long-term current use of insulin (H)      !! thin (NO BRAND SPECIFIED) lancets Use with lanceting device. To accompany: Blood Glucose Monitor Brands: per insurance.  Qty: 100 each, Refills: 6    Associated Diagnoses: Type 2 diabetes mellitus with hyperglycemia, with long-term current use of insulin (H)       !! - Potential duplicate medications found. Please discuss with provider.     "  STOP taking these medications       insulin glargine (BASAGLAR KWIKPEN) 100 UNIT/ML pen Comments:   Reason for Stopping:         insulin glargine (LANTUS SOLOSTAR) 100 UNIT/ML pen Comments:   Reason for Stopping:             Allergies   Allergies   Allergen Reactions     Metformin      Abdominal pain     Milk [Lac Bovis] Hives     Tylenol [Acetaminophen] Itching     Dulaglutide Rash     Penicillin V Other (See Comments) and Rash     Diffuse maculopapular rash + feels \"high\", per pt.      "

## 2021-07-17 NOTE — PROGRESS NOTES
Patient discharged at 1800, teaching about medication storage and after care  completed and AVS signs, medications picked up from discharge pharmacy.

## 2021-07-17 NOTE — PROVIDER NOTIFICATION
"Writer called the discharge pharmacy to inquire on the approximate time when the orders will be ready for . The pharmacist said that the rx order was sent to clinic pharmacy. Writer informed provider, \"Hello, FYI. The prescribed medication has been sent to clinic pharmacy and is closed today. Can you please send the orders to our d/c pharmacy. Thank you, Didi ([id number])    Didi Parisi RN on 7/17/2021 at 1:06 PM    "

## 2021-07-17 NOTE — PROGRESS NOTES
Vitals: Temp: 97.9  F (36.6  C) Temp src: Oral BP: 137/87 Pulse: 83   Resp: 18 SpO2: 98 % O2 Device: None (Room air)       Time: 4247-5883  Reason for admission: Small bowel obstruction.  Activity:  Independent with cares.   Pain:  Denies pain.  Neuro: A&O x4. Able to make needs known. Pleasant and cooperative with cares.   Cardiac: WDL, denies chest pain.   Respiratory:  Chest expansion symmetrical and non labored. No cough.   GI/:  Abd soft and non tender. One medium formed BM ronda colored. Passing flatus. Voids spontaneously.   Diet: Low fiber diet.   Lines:  R PIV saline locked.   Incisions/Drains/Skin:  Skin CDI.   Lab:  BS q 4hr 238, 251, 270  New changes this shift: Seen by . Awaiting for discharge pharmacy to fill prescreptions. Showered. Linens changed.

## 2021-07-17 NOTE — PROVIDER NOTIFICATION
"Provider notified, \"Hello, FYI  saw the patient. Patient is asking about new Glucometer upon D/C. States, \"old one was stolen.\" Thank you, SENG Tolbert ([id number])\"    Didi Parisi RN on 7/17/2021 at 12:34 PM    "

## 2021-07-19 ENCOUNTER — PATIENT OUTREACH (OUTPATIENT)
Dept: CARE COORDINATION | Facility: CLINIC | Age: 56
End: 2021-07-19

## 2021-07-19 DIAGNOSIS — Z71.89 OTHER SPECIFIED COUNSELING: ICD-10-CM

## 2021-07-19 NOTE — PROGRESS NOTES
Clinic Care Coordination Contact  [facility]/Voicemail       Clinical Data: Care Coordinator Outreach    Outreach attempted x 1.  Called number for patient - person who answered was not patient and would not state who they were then hung up on me.    Plan: Care Coordinator will do no further outreaches at this time.    Gill Miranda  [facility] Resource [facility]

## 2021-08-12 DIAGNOSIS — K21.9 GASTROESOPHAGEAL REFLUX DISEASE WITHOUT ESOPHAGITIS: Primary | ICD-10-CM

## 2021-08-12 DIAGNOSIS — B20 HUMAN IMMUNODEFICIENCY VIRUS (HIV) DISEASE (H): ICD-10-CM

## 2021-08-12 NOTE — TELEPHONE ENCOUNTER
Pt called to order Biktarvy and Famotadine.    Will mail prescriptions When refill has been approved. The address has been verified.   30 day supply       Lenka Mabry CPhT  [facility]  [phone]

## 2021-08-13 ENCOUNTER — APPOINTMENT (OUTPATIENT)
Dept: CT IMAGING | Facility: CLINIC | Age: 56
End: 2021-08-13
Attending: EMERGENCY MEDICINE
Payer: COMMERCIAL

## 2021-08-13 ENCOUNTER — HOSPITAL ENCOUNTER (EMERGENCY)
Facility: CLINIC | Age: 56
Discharge: HOME | End: 2021-08-14
Attending: EMERGENCY MEDICINE | Admitting: EMERGENCY MEDICINE
Payer: COMMERCIAL

## 2021-08-13 DIAGNOSIS — R10.32 ABDOMINAL PAIN, LEFT LOWER QUADRANT: ICD-10-CM

## 2021-08-13 LAB
ALBUMIN SERPL-MCNC: 3.7 G/DL (ref 3.4–5)
ALP SERPL-CCNC: 196 U/L (ref 40–150)
ALT SERPL W P-5'-P-CCNC: 33 U/L (ref 0–70)
ANION GAP SERPL CALCULATED.3IONS-SCNC: 6 MMOL/L (ref 3–14)
AST SERPL W P-5'-P-CCNC: 16 U/L (ref 0–45)
BASOPHILS # BLD AUTO: 0.1 10E3/UL (ref 0–0.2)
BASOPHILS NFR BLD AUTO: 1 %
BILIRUB SERPL-MCNC: 0.4 MG/DL (ref 0.2–1.3)
BUN SERPL-MCNC: 18 MG/DL (ref 7–30)
CALCIUM SERPL-MCNC: 9.8 MG/DL (ref 8.5–10.1)
CHLORIDE BLD-SCNC: 101 MMOL/L (ref 94–109)
CO2 SERPL-SCNC: 27 MMOL/L (ref 20–32)
CREAT SERPL-MCNC: 0.79 MG/DL (ref 0.66–1.25)
EOSINOPHIL # BLD AUTO: 0.2 10E3/UL (ref 0–0.7)
EOSINOPHIL NFR BLD AUTO: 2 %
ERYTHROCYTE [DISTWIDTH] IN BLOOD BY AUTOMATED COUNT: 13 % (ref 10–15)
GFR SERPL CREATININE-BSD FRML MDRD: >90 ML/MIN/1.73M2
GLUCOSE BLD-MCNC: 467 MG/DL (ref 70–99)
HCT VFR BLD AUTO: 42 % (ref 40–53)
HGB BLD-MCNC: 14.4 G/DL (ref 13.3–17.7)
HOLD SPECIMEN: NORMAL
IMM GRANULOCYTES # BLD: 0.1 10E3/UL
IMM GRANULOCYTES NFR BLD: 1 %
LACTATE SERPL-SCNC: 1.3 MMOL/L (ref 0.7–2)
LIPASE SERPL-CCNC: 162 U/L (ref 73–393)
LYMPHOCYTES # BLD AUTO: 2.1 10E3/UL (ref 0.8–5.3)
LYMPHOCYTES NFR BLD AUTO: 27 %
MCH RBC QN AUTO: 29.9 PG (ref 26.5–33)
MCHC RBC AUTO-ENTMCNC: 34.3 G/DL (ref 31.5–36.5)
MCV RBC AUTO: 87 FL (ref 78–100)
MONOCYTES # BLD AUTO: 0.6 10E3/UL (ref 0–1.3)
MONOCYTES NFR BLD AUTO: 8 %
NEUTROPHILS # BLD AUTO: 4.9 10E3/UL (ref 1.6–8.3)
NEUTROPHILS NFR BLD AUTO: 61 %
NRBC # BLD AUTO: 0 10E3/UL
NRBC BLD AUTO-RTO: 0 /100
PLATELET # BLD AUTO: 392 10E3/UL (ref 150–450)
POTASSIUM BLD-SCNC: 3.9 MMOL/L (ref 3.4–5.3)
PROT SERPL-MCNC: 8 G/DL (ref 6.8–8.8)
RBC # BLD AUTO: 4.82 10E6/UL (ref 4.4–5.9)
SODIUM SERPL-SCNC: 134 MMOL/L (ref 133–144)
TROPONIN I SERPL-MCNC: <0.015 UG/L (ref 0–0.04)
WBC # BLD AUTO: 7.9 10E3/UL (ref 4–11)

## 2021-08-13 PROCEDURE — 80053 COMPREHEN METABOLIC PANEL: CPT | Performed by: EMERGENCY MEDICINE

## 2021-08-13 PROCEDURE — 36415 COLL VENOUS BLD VENIPUNCTURE: CPT | Performed by: EMERGENCY MEDICINE

## 2021-08-13 PROCEDURE — 84484 ASSAY OF TROPONIN QUANT: CPT | Performed by: EMERGENCY MEDICINE

## 2021-08-13 PROCEDURE — 93005 ELECTROCARDIOGRAM TRACING: CPT | Performed by: EMERGENCY MEDICINE

## 2021-08-13 PROCEDURE — 99285 EMERGENCY DEPT VISIT HI MDM: CPT | Mod: 25 | Performed by: EMERGENCY MEDICINE

## 2021-08-13 PROCEDURE — 93010 ELECTROCARDIOGRAM REPORT: CPT | Performed by: EMERGENCY MEDICINE

## 2021-08-13 PROCEDURE — 85025 COMPLETE CBC W/AUTO DIFF WBC: CPT | Performed by: EMERGENCY MEDICINE

## 2021-08-13 PROCEDURE — 96374 THER/PROPH/DIAG INJ IV PUSH: CPT | Mod: 59 | Performed by: EMERGENCY MEDICINE

## 2021-08-13 PROCEDURE — 250N000013 HC RX MED GY IP 250 OP 250 PS 637: Performed by: EMERGENCY MEDICINE

## 2021-08-13 PROCEDURE — 83605 ASSAY OF LACTIC ACID: CPT | Performed by: EMERGENCY MEDICINE

## 2021-08-13 PROCEDURE — 83690 ASSAY OF LIPASE: CPT | Performed by: EMERGENCY MEDICINE

## 2021-08-13 PROCEDURE — 250N000011 HC RX IP 250 OP 636: Performed by: EMERGENCY MEDICINE

## 2021-08-13 PROCEDURE — 250N000009 HC RX 250: Performed by: EMERGENCY MEDICINE

## 2021-08-13 PROCEDURE — 74177 CT ABD & PELVIS W/CONTRAST: CPT | Mod: 26 | Performed by: RADIOLOGY

## 2021-08-13 PROCEDURE — 74177 CT ABD & PELVIS W/CONTRAST: CPT

## 2021-08-13 RX ORDER — FAMOTIDINE 20 MG/1
20 TABLET, FILM COATED ORAL 2 TIMES DAILY
Qty: 30 TABLET | Refills: 11 | Status: ON HOLD | OUTPATIENT
Start: 2021-08-13 | End: 2021-09-09

## 2021-08-13 RX ORDER — IOPAMIDOL 755 MG/ML
111 INJECTION, SOLUTION INTRAVASCULAR ONCE
Status: COMPLETED | OUTPATIENT
Start: 2021-08-13 | End: 2021-08-14

## 2021-08-13 RX ORDER — MORPHINE SULFATE 4 MG/ML
4 INJECTION, SOLUTION INTRAMUSCULAR; INTRAVENOUS ONCE
Status: COMPLETED | OUTPATIENT
Start: 2021-08-13 | End: 2021-08-13

## 2021-08-13 RX ADMIN — MORPHINE SULFATE 4 MG: 4 INJECTION INTRAVENOUS at 23:14

## 2021-08-13 RX ADMIN — LIDOCAINE HYDROCHLORIDE 30 ML: 20 SOLUTION ORAL; TOPICAL at 23:14

## 2021-08-13 ASSESSMENT — ENCOUNTER SYMPTOMS
ABDOMINAL PAIN: 1
NAUSEA: 0
VOMITING: 0
FEVER: 0
HEMATURIA: 0
ABDOMINAL DISTENTION: 1
DYSURIA: 0
SHORTNESS OF BREATH: 0

## 2021-08-13 ASSESSMENT — MIFFLIN-ST. JEOR: SCORE: 1552.47

## 2021-08-14 VITALS
DIASTOLIC BLOOD PRESSURE: 79 MMHG | OXYGEN SATURATION: 97 % | SYSTOLIC BLOOD PRESSURE: 128 MMHG | HEART RATE: 86 BPM | HEIGHT: 64 IN | WEIGHT: 180 LBS | TEMPERATURE: 98 F | BODY MASS INDEX: 30.73 KG/M2 | RESPIRATION RATE: 16 BRPM

## 2021-08-14 LAB
ALBUMIN UR-MCNC: NEGATIVE MG/DL
APPEARANCE UR: CLEAR
ATRIAL RATE - MUSE: 112 BPM
BILIRUB UR QL STRIP: NEGATIVE
COLOR UR AUTO: ABNORMAL
DIASTOLIC BLOOD PRESSURE - MUSE: NORMAL MMHG
GLUCOSE BLDC GLUCOMTR-MCNC: 249 MG/DL (ref 70–99)
GLUCOSE BLDC GLUCOMTR-MCNC: 336 MG/DL (ref 70–99)
GLUCOSE BLDC GLUCOMTR-MCNC: 351 MG/DL (ref 70–99)
GLUCOSE UR STRIP-MCNC: 1000 MG/DL
HGB UR QL STRIP: NEGATIVE
INTERPRETATION ECG - MUSE: NORMAL
KETONES UR STRIP-MCNC: NEGATIVE MG/DL
LEUKOCYTE ESTERASE UR QL STRIP: NEGATIVE
NITRATE UR QL: NEGATIVE
P AXIS - MUSE: 29 DEGREES
PH UR STRIP: 5.5 [PH] (ref 5–7)
PR INTERVAL - MUSE: 128 MS
QRS DURATION - MUSE: 88 MS
QT - MUSE: 320 MS
QTC - MUSE: 436 MS
R AXIS - MUSE: -24 DEGREES
RBC URINE: 1 /HPF
SP GR UR STRIP: 1.01 (ref 1–1.03)
SYSTOLIC BLOOD PRESSURE - MUSE: NORMAL MMHG
T AXIS - MUSE: 4 DEGREES
UROBILINOGEN UR STRIP-MCNC: NORMAL MG/DL
VENTRICULAR RATE- MUSE: 112 BPM
WBC URINE: 0 /HPF

## 2021-08-14 PROCEDURE — 250N000011 HC RX IP 250 OP 636: Performed by: EMERGENCY MEDICINE

## 2021-08-14 PROCEDURE — 81001 URINALYSIS AUTO W/SCOPE: CPT | Performed by: EMERGENCY MEDICINE

## 2021-08-14 PROCEDURE — 96361 HYDRATE IV INFUSION ADD-ON: CPT | Performed by: EMERGENCY MEDICINE

## 2021-08-14 PROCEDURE — 250N000012 HC RX MED GY IP 250 OP 636 PS 637: Performed by: EMERGENCY MEDICINE

## 2021-08-14 PROCEDURE — 96372 THER/PROPH/DIAG INJ SC/IM: CPT | Performed by: EMERGENCY MEDICINE

## 2021-08-14 PROCEDURE — 258N000003 HC RX IP 258 OP 636: Performed by: EMERGENCY MEDICINE

## 2021-08-14 RX ADMIN — INSULIN ASPART 10 UNITS: 100 INJECTION, SOLUTION INTRAVENOUS; SUBCUTANEOUS at 01:01

## 2021-08-14 RX ADMIN — SODIUM CHLORIDE 1000 ML: 9 INJECTION, SOLUTION INTRAVENOUS at 00:45

## 2021-08-14 RX ADMIN — IOPAMIDOL 111 ML: 755 INJECTION, SOLUTION INTRAVENOUS at 00:18

## 2021-08-14 NOTE — ED PROVIDER NOTES
[place] EMERGENCY DEPARTMENT ([facility])  8/13/21    History     Chief Complaint   Patient presents with     Abdominal Pain     The history is provided by the patient and medical records.     Andrew Lockwood is a 55 year old male who presents to the Emergency Department with abdominal pain. Patient has a history of HIV/AIDS on HAART therapy, CNS toxoplasmosis, type 2 diabetes mellitus, recurrent SBOs, GERD, HLD, s/p appendectomy (1/31/2018). Patient reports left-sided abdominal pain beginning yesterday. He states this is a sharp pain he rates 13/10. He states he has been having this pain on and off for 5 years. Patient was recently admitted from 7/13-7/17 with SBO, states pain resolved following this for a couple weeks and has now returned. Patient reports pain is worse with eating or moving. He states he feels bloated. Patient reports his last BM was yesterday around 5 pm. He is not passing gas. Patient denies nausea or vomiting. He notes heartburn. No fever, shortness of breath, or chest pain. No dysuria or hematuria. Patient has been taking his HIV medications.    Past Medical History  Past Medical History:   Diagnosis Date     AIDS (H)      Allergic rhinitis due to other allergen     DNS     Anal dysplasia      Chronic abdominal pain      CNS toxoplasmosis (H)      Diabetes type 2, controlled (H)      GERD (gastroesophageal reflux disease)      History of seizure      History of substance abuse (H)      HIV (human immunodeficiency virus infection) (H)      HLD (hyperlipidemia)      Lung nodules      Periungual wart      PTSD (post-traumatic stress disorder)      Sleep apnea     doesn't use CPAP     Past Surgical History:   Procedure Laterality Date     ANOSCOPY N/A 9/9/2020    Procedure: Exam Under Anesthesia, ANOSCOPY, fulgeration of rectal fissures with Rectal Biopsies;  Surgeon: Thanh Lundberg MD;  Location: [facility] OR     COLONOSCOPY Left 1/22/2016    Procedure: COMBINED COLONOSCOPY, SINGLE OR  MULTIPLE BIOPSY/POLYPECTOMY BY BIOPSY;  Surgeon: Clark Saini MD;  Location: [facility] GI     HC EXPLORE UNDESC TESTIS,INGUIN/SCROTAL       LAPAROSCOPIC APPENDECTOMY N/A 1/31/2018    Procedure: LAPAROSCOPIC APPENDECTOMY;  LAPAROSCOPIC APPENDECTOMY;  Surgeon: Dawn Holt MD;  Location: [facility] OR     LAPAROSCOPY DIAGNOSTIC (GENERAL) N/A 7/26/2016    Procedure: LAPAROSCOPY DIAGNOSTIC (GENERAL);  Surgeon: Susannah Arriaga MD;  Location: [facility] OR     LAPAROSCOPY DIAGNOSTIC (GENERAL) N/A 4/16/2018    Procedure: LAPAROSCOPY DIAGNOSTIC (GENERAL);  Diagnostic laparoscopy and lysis of adhesions;  Surgeon: Prince Dowling MD;  Location: [facility] OR     OPTICAL TRACKING SYSTEM CRANIOTOMY, EXCISE TUMOR, COMBINED Left 4/10/2015    Procedure: COMBINED OPTICAL TRACKING SYSTEM CRANIOTOMY, EXCISE TUMOR;  Surgeon: Mirlande Colmenares MD;  Location: [facility] OR     REPAIR GAMEKEEPER'S THUMB Right 12/2/2016    Procedure: REPAIR LIGAMENT ULNAR COLLATERAL THUMB (GAMEKEEPER'S);  Surgeon: Evin Zamorano MD;  Location: [facility] OR     ZZC NONSPECIFIC PROCEDURE      right forearm fracture     alcohol swab prep pads  bictegravir-emtricitabine-tenofovir (BIKTARVY) -25 MG per tablet  blood glucose (NO BRAND SPECIFIED) lancets standard  blood glucose (NO BRAND SPECIFIED) lancets standard  blood glucose (NO BRAND SPECIFIED) test strip  blood glucose (NO BRAND SPECIFIED) test strip  blood glucose (NO BRAND SPECIFIED) test strip  blood glucose (NO BRAND SPECIFIED) test strip  blood glucose calibration (NO BRAND SPECIFIED) solution  blood glucose calibration (NO BRAND SPECIFIED) solution  blood glucose calibration (NO BRAND SPECIFIED) solution  blood glucose monitoring (NO BRAND SPECIFIED) meter device kit  blood glucose monitoring (NO BRAND SPECIFIED) meter device kit  blood glucose monitoring (NO BRAND SPECIFIED) meter device kit  blood glucose monitoring (NO BRAND SPECIFIED) meter device kit  famotidine (PEPCID) 20 MG  "tablet  glucose 40 % (400 mg/mL) gel  insulin detemir (LEVEMIR PEN) 100 UNIT/ML pen  insulin pen needle (32G X 4 MM) 32G X 4 MM miscellaneous  insulin pen needle (B-D U/F) 31G X 8 MM miscellaneous  insulin pen needle (BD SCOTT U/F) 32G X 4 MM miscellaneous  Lancets (ONETOUCH DELICA PLUS [id number]) MIS  NOVOLOG FLEXPEN 100 UNIT/ML soln  SENNA-docusate sodium (SENNA S) 8.6-50 MG tablet  Sharps Container MISC  STATIN NOT PRESCRIBED (INTENTIONAL)  thin (NO BRAND SPECIFIED) lancets  thin (NO BRAND SPECIFIED) lancets      Allergies   Allergen Reactions     Metformin      Abdominal pain     Milk [Lac Bovis] Hives     Tylenol [Acetaminophen] Itching     Dulaglutide Rash     Penicillin V Other (See Comments) and Rash     Diffuse maculopapular rash + feels \"high\", per pt.      Family History  Family History   Problem Relation Age of Onset     Diabetes Brother      Diabetes Father      Alzheimer Disease Father      Unknown/Adopted Mother      Diabetes Paternal Grandfather      Cancer No family hx of         no skin cancer     Skin Cancer No family hx of         no famiy hx of skin cancer     Glaucoma No family hx of      Macular Degeneration No family hx of      Social History   Social History     Tobacco Use     Smoking status: Current Every Day Smoker     Packs/day: 0.50     Years: 40.00     Pack years: 20.00     Types: Cigarettes     Smokeless tobacco: Former User   Substance Use Topics     Alcohol use: No     Alcohol/week: 0.0 standard drinks     Comment: Last etoh in 2007     Drug use: Not Currently     Types: Marijuana, Methamphetamines     Comment: Sober 9 months       Past medical history, past surgical history, medications, allergies, family history, and social history were reviewed with the patient. No additional pertinent items.       Review of Systems   Constitutional: Negative for fever.   Respiratory: Negative for shortness of breath.    Cardiovascular: Negative for chest pain.   Gastrointestinal: Positive for " "abdominal distention and abdominal pain. Negative for nausea and vomiting.   Genitourinary: Negative for dysuria and hematuria.   All other systems reviewed and are negative.    A complete review of systems was performed with pertinent positives and negatives noted in the HPI, and all other systems negative.    Physical Exam   BP: (!) 132/97  Pulse: 115  Temp: 98.1  F (36.7  C)  Resp: 14  Height: 162.6 cm (5' 4\")  Weight: 81.6 kg (180 lb)  SpO2: 99 %  Physical Exam  GEN:  Alert, well developed, appears somewhat uncomfortable from the belly pain.  HEENT:  PERRL, EOMI, Mucous membranes are moist.   Cardio:  RRR, no murmur, radial pulses equal bilaterally  PULM:  Lungs clear, good air movement, no wheezes, rales   Abd:  Soft, normal bowel sounds, nondistended, no tenderness to percussion and the abdomen is not tympanic, there is left-sided abdominal tenderness  Back exam:  No CVA tenderness  Musculoskeletal:  normal range of motion, no lower extremity swelling or calf tenderness  Neuro:  Alert and oriented X3, Follows commands, moving all extremities spontaneously   Skin:  Warm, dry    ED Course   11:00 PM  The patient was seen and examined by Carrie Mauricio MD in Room ED05.      Procedures            EKG Interpretation:      Interpreted by Carrie Mauricio MD  Time reviewed: 23:46 (reviewed by my colleague shortly after the EKG was done)  Symptoms at time of EKG: abdominal pain   Rhythm:  sinus tachycardia  Rate: 112  Axis: normal  Ectopy: none  Conduction: Possible left atrial enlargement  ST Segments/ T Waves: No ST-T wave changes  Q Waves: none  Comparison to prior: Unchanged from March 10, 2021    Clinical Impression: Sinus tachycardia, unchanged EKG        Labs were reviewed by me and results are shown below.  CT scan of the abdomen pelvis was done and results are shown below.  Patient was given a GI cocktail for his heartburn and IV morphine for pain.  He feels much better after the GI cocktail and a " morphine.     Results for orders placed or performed during the hospital encounter of 08/13/21   CT Abdomen Pelvis w Contrast     Status: None    Narrative    EXAM: CT ABDOMEN PELVIS W CONTRAST  LOCATION: [facility]  DATE/TIME: 8/14/2021 12:19 AM    INDICATION: Left abdominal pain  COMPARISON: 07/13/2021  TECHNIQUE: CT scan of the abdomen and pelvis was performed following injection of IV contrast. Multiplanar reformats were obtained. Dose reduction techniques were used.  CONTRAST: iopamidol (ISOVUE-370) solution 111 mL       FINDINGS:   LOWER CHEST: 3 mm subpleural nodule left lower lobe series 5 image 18. Subcentimeter lymph nodes adjacent to distal esophagus.    HEPATOBILIARY: Focal fatty infiltration along falciform ligament. Subcentimeter right hepatic hypodensity small for characterization.    PANCREAS: Normal.    SPLEEN: Normal.    ADRENAL GLANDS: Normal.    KIDNEYS/BLADDER: No hydronephrosis. Bladder wall thickening not excluded.    BOWEL: Normal caliber. Diverticulosis.    LYMPH NODES: Normal.    VASCULATURE: Unremarkable.    PELVIC ORGANS: Penile implant with pelvic reservoir. Prominent prostate.    MUSCULOSKELETAL: Minimal lower thoracic degenerative change.      Impression    IMPRESSION:   1.  No bowel obstruction or abscess.  2.  Cholelithiasis.  3.  Diverticulosis.  4.  Bladder wall thickening not excluded. Correlate for cystitis.   Extra Blue Top Tube     Status: None   Result Value Ref Range    Hold Specimen JIC    Extra Red Top Tube     Status: None   Result Value Ref Range    Hold Specimen JIC    Extra Purple Top Tube     Status: None   Result Value Ref Range    Hold Specimen JIC    CBC with platelets differential     Status: Abnormal    Narrative    The following orders were created for panel order CBC with platelets differential.  Procedure                               Abnormality         Status                     ---------                                -----------         ------                     CBC with platelets and d...[[id number]]  Abnormal            Final result                 Please view results for these tests on the individual orders.   Comprehensive metabolic panel     Status: Abnormal   Result Value Ref Range    Sodium 134 133 - 144 mmol/L    Potassium 3.9 3.4 - 5.3 mmol/L    Chloride 101 94 - 109 mmol/L    Carbon Dioxide (CO2) 27 20 - 32 mmol/L    Anion Gap 6 3 - 14 mmol/L    Urea Nitrogen 18 7 - 30 mg/dL    Creatinine 0.79 0.66 - 1.25 mg/dL    Calcium 9.8 8.5 - 10.1 mg/dL    Glucose 467 (H) 70 - 99 mg/dL    Alkaline Phosphatase 196 (H) 40 - 150 U/L    AST 16 0 - 45 U/L    ALT 33 0 - 70 U/L    Protein Total 8.0 6.8 - 8.8 g/dL    Albumin 3.7 3.4 - 5.0 g/dL    Bilirubin Total 0.4 0.2 - 1.3 mg/dL    GFR Estimate >90 >60 mL/min/1.73m2   CBC with platelets and differential     Status: Abnormal   Result Value Ref Range    WBC Count 7.9 4.0 - 11.0 10e3/uL    RBC Count 4.82 4.40 - 5.90 10e6/uL    Hemoglobin 14.4 13.3 - 17.7 g/dL    Hematocrit 42.0 40.0 - 53.0 %    MCV 87 78 - 100 fL    MCH 29.9 26.5 - 33.0 pg    MCHC 34.3 31.5 - 36.5 g/dL    RDW 13.0 10.0 - 15.0 %    Platelet Count 392 150 - 450 10e3/uL    % Neutrophils 61 %    % Lymphocytes 27 %    % Monocytes 8 %    % Eosinophils 2 %    % Basophils 1 %    % Immature Granulocytes 1 %    NRBCs per 100 WBC 0 <1 /100    Absolute Neutrophils 4.9 1.6 - 8.3 10e3/uL    Absolute Lymphocytes 2.1 0.8 - 5.3 10e3/uL    Absolute Monocytes 0.6 0.0 - 1.3 10e3/uL    Absolute Eosinophils 0.2 0.0 - 0.7 10e3/uL    Absolute Basophils 0.1 0.0 - 0.2 10e3/uL    Absolute Immature Granulocytes 0.1 (H) <=0.0 10e3/uL    Absolute NRBCs 0.0 10e3/uL   Troponin I     Status: Normal   Result Value Ref Range    Troponin I <0.015 0.000 - 0.045 ug/L   Lipase     Status: Normal   Result Value Ref Range    Lipase 162 73 - 393 U/L   Lactic acid whole blood     Status: Normal   Result Value Ref Range    Lactic Acid 1.3 0.7 - 2.0 mmol/L    EKG 12-lead, tracing only     Status: None (Preliminary result)   Result Value Ref Range    Systolic Blood Pressure  mmHg    Diastolic Blood Pressure  mmHg    Ventricular Rate 112 BPM    Atrial Rate 112 BPM    OH Interval 128 ms    QRS Duration 88 ms     ms    QTc 436 ms    P Axis 29 degrees    R AXIS -24 degrees    T Axis 4 degrees    Interpretation ECG       Sinus tachycardia  Possible Left atrial enlargement  Borderline ECG     [place] Draw     Status: None    Narrative    The following orders were created for panel order [place] Draw.  Procedure                               Abnormality         Status                     ---------                               -----------         ------                     Extra Blue Top Tube[[id number]]                              Final result               Extra Red Top Tube[[id number]]                               Final result               Extra Green Top (Lithium...[[id number]]                                                 Extra Purple Top Tube[[id number]]                            Final result                 Please view results for these tests on the individual orders.     Medications   0.9% sodium chloride BOLUS (has no administration in time range)   insulin aspart (NovoLOG) injection (RAPID ACTING) (has no administration in time range)   lidocaine (XYLOCAINE) 2 % 15 mL, alum & mag hydroxide-simethicone (MAALOX) 15 mL GI Cocktail (30 mLs Oral Given 8/13/21 2314)   morphine (PF) injection 4 mg (4 mg Intravenous Given 8/13/21 2314)   iopamidol (ISOVUE-370) solution 111 mL (111 mLs Intravenous Given 8/14/21 0018)   sodium chloride (PF) 0.9% PF flush 78 mL (78 mLs Intravenous Given 8/14/21 0019)        Assessments & Plan (with Medical Decision Making)   Patient presents with left-sided abdominal pain, similar to previous episodes of abdominal pain when he was diagnosed with small bowel obstruction.  CT of the abdomen pelvis is negative for small bowel obstruction  today, however is not excluding bladder wall thickening.  The time of shift change, patient has not yet provided a urine sample.  He will be given IV fluids and insulin for his hyperglycemia and we will obtain a urine sample to evaluate for urinary tract infection.  At the time of signout, the UA and repeat glucose are pending.  I anticipate discharge to home after better control of his blood sugar plus/minus treatment for urinary tract infection.  Labs are not suggestive of acute cholangitis, pancreatitis, ischemia.  He does not have right upper quadrant pain, doubt biliary colic.    I have reviewed the nursing notes. I have reviewed the findings, diagnosis, plan and need for follow up with the patient.    New Prescriptions    No medications on file       Final diagnoses:   Abdominal pain, left lower quadrant     I, Cielo Salcedo, am serving as a trained medical scribe to document services personally performed by Carrie Mauricio MD, based on the provider's statements to me.      I, Carrie Mauricio MD, was physically present and have reviewed and verified the accuracy of this note documented by Cielo Salcedo.     --  Carrie Mauricio MD  [facility] EMERGENCY DEPARTMENT  8/13/2021     Carrie Mauricio MD  08/14/21 0050

## 2021-08-14 NOTE — DISCHARGE INSTRUCTIONS
Please make an appointment to follow up with Your Primary Care Provider or infectious disease provider in 3-4 days if not improving.  Please return to the emergency department if you have fever, vomiting, worsening pain, fainting, any other concerns.

## 2021-08-14 NOTE — ED NOTES
Patient was accepted at shift change signout with a plan for me to follow-up on his urinalysis (unremarkable) and discharge after blood sugar was below 300.  He states he is feeling okay at this point in time.  He is encouraged to follow-up with his outpatient provider early this coming week, and to return to the ER with any new or worsening symptoms.  He verbalizes understanding is agreeable to the plan.    Dictation Disclaimer: Some of this Note has been completed with voice-recognition dictation software. Although errors are generally corrected real-time, there is the potential for a rare error to be present in the completed chart.       Dawn Yan MD  08/14/21 7915

## 2021-08-14 NOTE — ED TRIAGE NOTES
Patient started having abdominal pain last evening on the left side. Took pain medication at home without relief. Patient also complaining of chest discomfort.

## 2021-08-22 ENCOUNTER — HOSPITAL ENCOUNTER (EMERGENCY)
Facility: CLINIC | Age: 56
Discharge: HOME | End: 2021-08-23
Attending: EMERGENCY MEDICINE | Admitting: EMERGENCY MEDICINE
Payer: COMMERCIAL

## 2021-08-22 DIAGNOSIS — R10.32 LLQ ABDOMINAL PAIN: ICD-10-CM

## 2021-08-22 LAB — LACTATE SERPL-SCNC: 2.7 MMOL/L (ref 0.7–2)

## 2021-08-22 PROCEDURE — 96375 TX/PRO/DX INJ NEW DRUG ADDON: CPT | Performed by: EMERGENCY MEDICINE

## 2021-08-22 PROCEDURE — 93010 ELECTROCARDIOGRAM REPORT: CPT | Performed by: EMERGENCY MEDICINE

## 2021-08-22 PROCEDURE — 258N000003 HC RX IP 258 OP 636: Performed by: EMERGENCY MEDICINE

## 2021-08-22 PROCEDURE — 96361 HYDRATE IV INFUSION ADD-ON: CPT | Performed by: EMERGENCY MEDICINE

## 2021-08-22 PROCEDURE — 99285 EMERGENCY DEPT VISIT HI MDM: CPT | Mod: 25 | Performed by: EMERGENCY MEDICINE

## 2021-08-22 PROCEDURE — 82040 ASSAY OF SERUM ALBUMIN: CPT | Performed by: EMERGENCY MEDICINE

## 2021-08-22 PROCEDURE — 250N000009 HC RX 250: Performed by: EMERGENCY MEDICINE

## 2021-08-22 PROCEDURE — 83690 ASSAY OF LIPASE: CPT | Performed by: EMERGENCY MEDICINE

## 2021-08-22 PROCEDURE — 36415 COLL VENOUS BLD VENIPUNCTURE: CPT | Performed by: EMERGENCY MEDICINE

## 2021-08-22 PROCEDURE — 83605 ASSAY OF LACTIC ACID: CPT | Performed by: EMERGENCY MEDICINE

## 2021-08-22 PROCEDURE — 96374 THER/PROPH/DIAG INJ IV PUSH: CPT | Mod: 59 | Performed by: EMERGENCY MEDICINE

## 2021-08-22 PROCEDURE — 250N000013 HC RX MED GY IP 250 OP 250 PS 637: Performed by: EMERGENCY MEDICINE

## 2021-08-22 PROCEDURE — 84484 ASSAY OF TROPONIN QUANT: CPT | Performed by: EMERGENCY MEDICINE

## 2021-08-22 PROCEDURE — 250N000011 HC RX IP 250 OP 636: Performed by: EMERGENCY MEDICINE

## 2021-08-22 PROCEDURE — 93005 ELECTROCARDIOGRAM TRACING: CPT | Performed by: EMERGENCY MEDICINE

## 2021-08-22 PROCEDURE — 85025 COMPLETE CBC W/AUTO DIFF WBC: CPT | Performed by: EMERGENCY MEDICINE

## 2021-08-22 RX ORDER — MAGNESIUM HYDROXIDE/ALUMINUM HYDROXICE/SIMETHICONE 120; 1200; 1200 MG/30ML; MG/30ML; MG/30ML
30 SUSPENSION ORAL ONCE
Status: COMPLETED | OUTPATIENT
Start: 2021-08-22 | End: 2021-08-22

## 2021-08-22 RX ORDER — LIDOCAINE HYDROCHLORIDE 20 MG/ML
10 SOLUTION OROPHARYNGEAL ONCE
Status: COMPLETED | OUTPATIENT
Start: 2021-08-22 | End: 2021-08-22

## 2021-08-22 RX ORDER — ONDANSETRON 2 MG/ML
4 INJECTION INTRAMUSCULAR; INTRAVENOUS ONCE
Status: COMPLETED | OUTPATIENT
Start: 2021-08-22 | End: 2021-08-22

## 2021-08-22 RX ADMIN — LIDOCAINE HYDROCHLORIDE 10 ML: 20 SOLUTION ORAL; TOPICAL at 23:24

## 2021-08-22 RX ADMIN — SODIUM CHLORIDE 1000 ML: 9 INJECTION, SOLUTION INTRAVENOUS at 23:41

## 2021-08-22 RX ADMIN — ONDANSETRON 4 MG: 2 INJECTION INTRAMUSCULAR; INTRAVENOUS at 23:41

## 2021-08-22 RX ADMIN — ALUMINUM HYDROXIDE, MAGNESIUM HYDROXIDE, AND SIMETHICONE 30 ML: 200; 200; 20 SUSPENSION ORAL at 23:24

## 2021-08-23 ENCOUNTER — APPOINTMENT (OUTPATIENT)
Dept: CT IMAGING | Facility: CLINIC | Age: 56
End: 2021-08-23
Attending: EMERGENCY MEDICINE
Payer: COMMERCIAL

## 2021-08-23 ENCOUNTER — TELEPHONE (OUTPATIENT)
Dept: INFECTIOUS DISEASES | Facility: CLINIC | Age: 56
End: 2021-08-23

## 2021-08-23 VITALS
SYSTOLIC BLOOD PRESSURE: 132 MMHG | DIASTOLIC BLOOD PRESSURE: 92 MMHG | OXYGEN SATURATION: 97 % | RESPIRATION RATE: 16 BRPM | HEART RATE: 100 BPM | TEMPERATURE: 97.8 F

## 2021-08-23 DIAGNOSIS — Z21 HIV INFECTION, UNSPECIFIED SYMPTOM STATUS (H): Primary | ICD-10-CM

## 2021-08-23 LAB
ALBUMIN SERPL-MCNC: 3 G/DL (ref 3.4–5)
ALP SERPL-CCNC: 118 U/L (ref 40–150)
ALT SERPL W P-5'-P-CCNC: 25 U/L (ref 0–70)
ANION GAP SERPL CALCULATED.3IONS-SCNC: 9 MMOL/L (ref 3–14)
AST SERPL W P-5'-P-CCNC: 25 U/L (ref 0–45)
ATRIAL RATE - MUSE: 93 BPM
BASOPHILS # BLD AUTO: 0 10E3/UL (ref 0–0.2)
BASOPHILS NFR BLD AUTO: 1 %
BILIRUB SERPL-MCNC: 0.6 MG/DL (ref 0.2–1.3)
BUN SERPL-MCNC: 14 MG/DL (ref 7–30)
CALCIUM SERPL-MCNC: 8.3 MG/DL (ref 8.5–10.1)
CHLORIDE BLD-SCNC: 104 MMOL/L (ref 94–109)
CO2 SERPL-SCNC: 24 MMOL/L (ref 20–32)
CREAT SERPL-MCNC: 0.76 MG/DL (ref 0.66–1.25)
DIASTOLIC BLOOD PRESSURE - MUSE: NORMAL MMHG
EOSINOPHIL # BLD AUTO: 0.2 10E3/UL (ref 0–0.7)
EOSINOPHIL NFR BLD AUTO: 4 %
ERYTHROCYTE [DISTWIDTH] IN BLOOD BY AUTOMATED COUNT: 13.2 % (ref 10–15)
GFR SERPL CREATININE-BSD FRML MDRD: >90 ML/MIN/1.73M2
GLUCOSE BLD-MCNC: 291 MG/DL (ref 70–99)
GLUCOSE BLDC GLUCOMTR-MCNC: 290 MG/DL (ref 70–99)
HCT VFR BLD AUTO: 38 % (ref 40–53)
HGB BLD-MCNC: 12.7 G/DL (ref 13.3–17.7)
IMM GRANULOCYTES # BLD: 0 10E3/UL
IMM GRANULOCYTES NFR BLD: 1 %
INTERPRETATION ECG - MUSE: NORMAL
LACTATE SERPL-SCNC: 1.2 MMOL/L (ref 0.7–2)
LIPASE SERPL-CCNC: 184 U/L (ref 73–393)
LYMPHOCYTES # BLD AUTO: 2.1 10E3/UL (ref 0.8–5.3)
LYMPHOCYTES NFR BLD AUTO: 34 %
MCH RBC QN AUTO: 29.3 PG (ref 26.5–33)
MCHC RBC AUTO-ENTMCNC: 33.4 G/DL (ref 31.5–36.5)
MCV RBC AUTO: 88 FL (ref 78–100)
MONOCYTES # BLD AUTO: 0.6 10E3/UL (ref 0–1.3)
MONOCYTES NFR BLD AUTO: 10 %
NEUTROPHILS # BLD AUTO: 3.2 10E3/UL (ref 1.6–8.3)
NEUTROPHILS NFR BLD AUTO: 50 %
NRBC # BLD AUTO: 0 10E3/UL
NRBC BLD AUTO-RTO: 0 /100
P AXIS - MUSE: 22 DEGREES
PLATELET # BLD AUTO: 337 10E3/UL (ref 150–450)
POTASSIUM BLD-SCNC: 3.9 MMOL/L (ref 3.4–5.3)
PR INTERVAL - MUSE: 138 MS
PROT SERPL-MCNC: 6.6 G/DL (ref 6.8–8.8)
QRS DURATION - MUSE: 88 MS
QT - MUSE: 358 MS
QTC - MUSE: 445 MS
R AXIS - MUSE: -26 DEGREES
RBC # BLD AUTO: 4.34 10E6/UL (ref 4.4–5.9)
SODIUM SERPL-SCNC: 137 MMOL/L (ref 133–144)
SYSTOLIC BLOOD PRESSURE - MUSE: NORMAL MMHG
T AXIS - MUSE: -17 DEGREES
TROPONIN I SERPL-MCNC: <0.015 UG/L (ref 0–0.04)
VENTRICULAR RATE- MUSE: 93 BPM
WBC # BLD AUTO: 6.2 10E3/UL (ref 4–11)

## 2021-08-23 PROCEDURE — 83605 ASSAY OF LACTIC ACID: CPT | Performed by: EMERGENCY MEDICINE

## 2021-08-23 PROCEDURE — 250N000009 HC RX 250: Performed by: EMERGENCY MEDICINE

## 2021-08-23 PROCEDURE — 250N000011 HC RX IP 250 OP 636: Performed by: EMERGENCY MEDICINE

## 2021-08-23 PROCEDURE — 258N000003 HC RX IP 258 OP 636: Performed by: EMERGENCY MEDICINE

## 2021-08-23 PROCEDURE — 74177 CT ABD & PELVIS W/CONTRAST: CPT

## 2021-08-23 PROCEDURE — 36415 COLL VENOUS BLD VENIPUNCTURE: CPT | Performed by: EMERGENCY MEDICINE

## 2021-08-23 PROCEDURE — 96365 THER/PROPH/DIAG IV INF INIT: CPT | Performed by: EMERGENCY MEDICINE

## 2021-08-23 RX ORDER — IOPAMIDOL 755 MG/ML
100 INJECTION, SOLUTION INTRAVASCULAR ONCE
Status: COMPLETED | OUTPATIENT
Start: 2021-08-23 | End: 2021-08-23

## 2021-08-23 RX ORDER — FENTANYL CITRATE 50 UG/ML
50 INJECTION, SOLUTION INTRAMUSCULAR; INTRAVENOUS ONCE
Status: COMPLETED | OUTPATIENT
Start: 2021-08-23 | End: 2021-08-23

## 2021-08-23 RX ADMIN — SODIUM CHLORIDE 62 ML: 9 INJECTION, SOLUTION INTRAVENOUS at 00:59

## 2021-08-23 RX ADMIN — SODIUM CHLORIDE 1000 ML: 9 INJECTION, SOLUTION INTRAVENOUS at 04:17

## 2021-08-23 RX ADMIN — IOPAMIDOL 89 ML: 755 INJECTION, SOLUTION INTRAVENOUS at 00:58

## 2021-08-23 RX ADMIN — FENTANYL CITRATE 50 MCG: 50 INJECTION, SOLUTION INTRAMUSCULAR; INTRAVENOUS at 00:14

## 2021-08-23 NOTE — ED PROVIDER NOTES
[facility] EMERGENCY DEPARTMENT ([facility])   August 22, 2021  History     Chief Complaint   Patient presents with     Abdominal Pain     Pt reports LLQ abdominal since 5 pm today, reports last BM yesterday 3AM, pt reports many bowel obstructions in hx and this feels the same, no n/v      Heartburn     Pt reports heartburn starting at 5 pm as well today, pt reports this happens with small bowel obtruction but is usually relieved with GI cocktail in past     HPI  Andrew Lockwood is a 55 year old male with PMH significant for HIV/AIDS on HAART therapy, CNS toxoplasmosis, DM2, recurrent SBO's, GERD, HLD, and s/p appendectomy (2018) who resents with abdominal pain.  Patient reports left lower quadrant abdominal pain that began at 5 PM this evening.  He states that he gets pain like this when he has a bowel obstruction.  He has had no nausea or vomiting with this.  He does have sensation of heartburn.  He had a bowel movement yesterday which was normal.  He denies any dysuria.  He denies any abdominal distention at this point.    Per review of the chart, patient visited [facility] ED on 8/13/2021 for evaluation of abdominal pain similar to her prior episodes of SBO.  CT abdomen pelvis negative for SBO, however not excluding bladder wall thickening.  UA unremarkable.    CT ABDOMEN PELVIS W CONTRAST 8/14/2021 12:19 AM  IMPRESSION:   1.  No bowel obstruction or abscess.  2.  Cholelithiasis.  3.  Diverticulosis.  4.  Bladder wall thickening not excluded. Correlate for cystitis.    PAST MEDICAL HISTORY:   Past Medical History:   Diagnosis Date     AIDS (H)      Allergic rhinitis due to other allergen     DNS     Anal dysplasia      Chronic abdominal pain      CNS toxoplasmosis (H)      Diabetes type 2, controlled (H)      GERD (gastroesophageal reflux disease)      History of seizure      History of substance abuse (H)      HIV (human immunodeficiency virus infection) (H)      HLD (hyperlipidemia)      Lung nodules       Periungual wart      PTSD (post-traumatic stress disorder)      Sleep apnea     doesn't use CPAP       PAST SURGICAL HISTORY:   Past Surgical History:   Procedure Laterality Date     ANOSCOPY N/A 9/9/2020    Procedure: Exam Under Anesthesia, ANOSCOPY, fulgeration of rectal fissures with Rectal Biopsies;  Surgeon: Thanh Lundberg MD;  Location: [facility] OR     COLONOSCOPY Left 1/22/2016    Procedure: COMBINED COLONOSCOPY, SINGLE OR MULTIPLE BIOPSY/POLYPECTOMY BY BIOPSY;  Surgeon: Clark Saini MD;  Location: [facility] GI     HC EXPLORE UNDESC TESTIS,INGUIN/SCROTAL       LAPAROSCOPIC APPENDECTOMY N/A 1/31/2018    Procedure: LAPAROSCOPIC APPENDECTOMY;  LAPAROSCOPIC APPENDECTOMY;  Surgeon: Dawn Holt MD;  Location: [facility] OR     LAPAROSCOPY DIAGNOSTIC (GENERAL) N/A 7/26/2016    Procedure: LAPAROSCOPY DIAGNOSTIC (GENERAL);  Surgeon: Susannah Arriaga MD;  Location: [facility] OR     LAPAROSCOPY DIAGNOSTIC (GENERAL) N/A 4/16/2018    Procedure: LAPAROSCOPY DIAGNOSTIC (GENERAL);  Diagnostic laparoscopy and lysis of adhesions;  Surgeon: Prince Dowling MD;  Location: [facility] OR     OPTICAL TRACKING SYSTEM CRANIOTOMY, EXCISE TUMOR, COMBINED Left 4/10/2015    Procedure: COMBINED OPTICAL TRACKING SYSTEM CRANIOTOMY, EXCISE TUMOR;  Surgeon: Mirlande Colmenares MD;  Location: [facility] OR     REPAIR GAMEKEEPER'S THUMB Right 12/2/2016    Procedure: REPAIR LIGAMENT ULNAR COLLATERAL THUMB (GAMEKEEPER'S);  Surgeon: Evin Zamorano MD;  Location: [facility] OR     ZZC NONSPECIFIC PROCEDURE      right forearm fracture       Past medical history, past surgical history, medications, and allergies were reviewed with the patient. Additional pertinent items: None    FAMILY HISTORY:   Family History   Problem Relation Age of Onset     Diabetes Brother      Diabetes Father      Alzheimer Disease Father      Unknown/Adopted Mother      Diabetes Paternal Grandfather      Cancer No family hx of         no skin cancer     Skin Cancer No family hx of          no famiy hx of skin cancer     Glaucoma No family hx of      Macular Degeneration No family hx of        SOCIAL HISTORY:   Social History     Tobacco Use     Smoking status: Current Every Day Smoker     Packs/day: 0.50     Years: 40.00     Pack years: 20.00     Types: Cigarettes     Smokeless tobacco: Former User   Substance Use Topics     Alcohol use: No     Alcohol/week: 0.0 standard drinks     Comment: Last etoh in 2007     Social history was reviewed with the patient. Additional pertinent items: None      Patient's Medications   New Prescriptions    No medications on file   Previous Medications    ALCOHOL SWAB PREP PADS    Use to swab area of injection/todd as directed.    BICTEGRAVIR-EMTRICITABINE-TENOFOVIR (BIKTARVY) -25 MG PER TABLET    Take 1 tablet by mouth daily    BLOOD GLUCOSE (NO BRAND SPECIFIED) LANCETS STANDARD    To use to test glucose level in the blood. Use to test blood sugar  4  times daily as directed. To accompany glucose monitor brands per insurance coverage.    BLOOD GLUCOSE (NO BRAND SPECIFIED) LANCETS STANDARD    Use to test blood sugar four times daily or as directed.    BLOOD GLUCOSE (NO BRAND SPECIFIED) TEST STRIP    To use to test glucose level in the blood. Use to test blood sugar 4 times daily as directed. To accompany glucose monitor brands per insurance coverage.    BLOOD GLUCOSE (NO BRAND SPECIFIED) TEST STRIP    1 strip by In Vitro route 4 times daily (before meals and nightly) Use to test blood sugars 4 times daily or as directed    BLOOD GLUCOSE (NO BRAND SPECIFIED) TEST STRIP    Use to test blood sugar 4 times daily or as directed. To accompany:whatever is covered    BLOOD GLUCOSE (NO BRAND SPECIFIED) TEST STRIP    Use to test blood sugar 4-6 times daily or as directed. To accompany: Blood Glucose Monitor Brands: per insurance.    BLOOD GLUCOSE CALIBRATION (NO BRAND SPECIFIED) SOLUTION    Used to calibrate the blood glucose monitor as needed and as directed.  To  accompany  blood glucose brands per insurance coverage    BLOOD GLUCOSE CALIBRATION (NO BRAND SPECIFIED) SOLUTION    To accompany: whatever is covered    BLOOD GLUCOSE CALIBRATION (NO BRAND SPECIFIED) SOLUTION    To accompany: Blood Glucose Monitor Brands: per insurance.    BLOOD GLUCOSE MONITORING (NO BRAND SPECIFIED) METER DEVICE KIT    Use as directed. Per insurance coverage.    BLOOD GLUCOSE MONITORING (NO BRAND SPECIFIED) METER DEVICE KIT    Use to test blood sugar 4 times daily or as directed.    BLOOD GLUCOSE MONITORING (NO BRAND SPECIFIED) METER DEVICE KIT    Use to test blood sugar 4 times daily or as directed. Preferred blood glucose meter OR supplies to accompany: whatever is covered    BLOOD GLUCOSE MONITORING (NO BRAND SPECIFIED) METER DEVICE KIT    Use to test blood sugar 4-6 times daily or as directed. Preferred blood glucose meter OR supplies to accompany: Blood Glucose Monitor Brands: per insurance.    FAMOTIDINE (PEPCID) 20 MG TABLET    Take 1 tablet (20 mg) by mouth 2 times daily    GLUCOSE 40 % (400 MG/ML) GEL    15 g every 15 minutes by mouth as needed for low blood sugar. Oral gel is preferable for conscious and able to swallow patient.    INSULIN DETEMIR (LEVEMIR PEN) 100 UNIT/ML PEN    Inject 35 Units Subcutaneous 2 times daily    INSULIN PEN NEEDLE (32G X 4 MM) 32G X 4 MM MISCELLANEOUS    Use as directed by provider. Per insurance coverage    INSULIN PEN NEEDLE (B-D U/F) 31G X 8 MM MISCELLANEOUS    Use 1 pen needles daily or as directed.    INSULIN PEN NEEDLE (BD SCOTT U/F) 32G X 4 MM MISCELLANEOUS    USE 3 TO 4 NEEDLES DAILY    LANCETS (ONETOUCH DELICA PLUS [id number]) MISC    USE TO TEST BLOOD SUGAR FOUR TIMES DAILY OR AS DIRECTED    NOVOLOG FLEXPEN 100 UNIT/ML SOLN    Inject 20 units with each meal, along with sliding scale 1 unit per 25mg/dL over 140 before meals and over 200 at bedtime. Max daily dose 90 units    SENNA-DOCUSATE SODIUM (SENNA S) 8.6-50 MG TABLET    Take 1 tablet by mouth 2  "times daily Hold for loose stools.    SHARPS CONTAINER MISC    Use as directed to dispose of needles, lancets and other sharps. Per Insurance coverage    STATIN NOT PRESCRIBED (INTENTIONAL)    Please choose reason not prescribed, below    THIN (NO BRAND SPECIFIED) LANCETS    Use with lanceting device. To accompany: Test 4 times daily with whatever is covered    THIN (NO BRAND SPECIFIED) LANCETS    Use with lanceting device. To accompany: Blood Glucose Monitor Brands: per insurance.   Modified Medications    No medications on file   Discontinued Medications    No medications on file          Allergies   Allergen Reactions     Metformin      Abdominal pain     Milk [Lac Bovis] Hives     Tylenol [Acetaminophen] Itching     Dulaglutide Rash     Penicillin V Other (See Comments) and Rash     Diffuse maculopapular rash + feels \"high\", per pt.         Review of Systems  A complete review of systems was performed with pertinent positives and negatives noted in the HPI, and all other systems negative.    Physical Exam   BP: 122/89  Pulse: 100  Temp: 98.9  F (37.2  C)  Resp: 16  SpO2: 97 %      Physical Exam  Vitals and nursing note reviewed.   Constitutional:       General: He is not in acute distress.     Appearance: He is well-developed. He is not diaphoretic.   HENT:      Head: Normocephalic and atraumatic.   Eyes:      General: No scleral icterus.     Pupils: Pupils are equal, round, and reactive to light.   Cardiovascular:      Rate and Rhythm: Regular rhythm. Tachycardia present.      Heart sounds: Normal heart sounds.   Pulmonary:      Effort: No respiratory distress.      Breath sounds: Normal breath sounds.   Abdominal:      General: Bowel sounds are normal. There is no distension.      Palpations: Abdomen is soft.      Tenderness: There is abdominal tenderness in the left lower quadrant. There is no guarding or rebound.   Musculoskeletal:         General: No tenderness.   Skin:     General: Skin is warm.      " Findings: No rash.   Neurological:      General: No focal deficit present.      Mental Status: He is alert and oriented to person, place, and time.   Psychiatric:         Mood and Affect: Mood normal.         Behavior: Behavior normal.         ED Course        Procedures            EKG Interpretation:      Interpreted by Valeri Castro DO  Time reviewed: 2303  Symptoms at time of EKG: Heartburn/abdominal pain   Rhythm: normal sinus   Rate: normal  Axis: normal  Ectopy: none  Conduction: normal  ST Segments/ T Waves: No ST-T wave changes  Q Waves: none  Comparison to prior: Unchanged from 8/13/21    Clinical Impression: normal EKG        Results for orders placed or performed during the hospital encounter of 08/22/21 (from the past 24 hour(s))   EKG 12-lead, tracing only   Result Value Ref Range    Systolic Blood Pressure  mmHg    Diastolic Blood Pressure  mmHg    Ventricular Rate 93 BPM    Atrial Rate 93 BPM    VT Interval 138 ms    QRS Duration 88 ms     ms    QTc 445 ms    P Axis 22 degrees    R AXIS -26 degrees    T Axis -17 degrees    Interpretation ECG Sinus rhythm  Normal ECG      CBC with platelets differential    Narrative    The following orders were created for panel order CBC with platelets differential.  Procedure                               Abnormality         Status                     ---------                               -----------         ------                     CBC with platelets and d...[[id number]]                                                   Please view results for these tests on the individual orders.   Lactic acid whole blood   Result Value Ref Range    Lactic Acid 2.7 (H) 0.7 - 2.0 mmol/L     *Note: Due to a large number of results and/or encounters for the requested time period, some results have not been displayed. A complete set of results can be found in Results Review.     Medications   0.9% sodium chloride BOLUS (1,000 mLs Intravenous New Bag 8/22/21 3657)    ondansetron (ZOFRAN) injection 4 mg (4 mg Intravenous Given 8/22/21 2691)   alum & mag hydroxide-simethicone (MAALOX) suspension 30 mL (30 mLs Oral Given 8/22/21 2324)   lidocaine (XYLOCAINE) 2 % solution 10 mL (10 mLs Mouth/Throat Given 8/22/21 2324)   fentaNYL (PF) (SUBLIMAZE) injection 50 mcg (50 mcg Intravenous Given 8/23/21 0014)             Assessments & Plan (with Medical Decision Making)   Patient was seen and examined.  Labs were drawn revealing a lactic acid of 2.7.  IV fluids, Zofran, GI cocktail, and fentanyl were given.  CT abdomen/pelvis was ordered to further evaluate for possible bowel obstruction.  Of note, he had a negative CT about 1 week ago.  CT is pending at this time.  Patient was signed out to the overnight provider pending this result and final disposition.    I have reviewed the nursing notes.    I have reviewed the findings, diagnosis, plan and need for follow up with the patient.    New Prescriptions    No medications on file       Final diagnoses:   LLQ abdominal pain       Valeri Castro     8/22/2021   [facility] EMERGENCY DEPARTMENT     Valeri Castro DO  08/23/21 0033

## 2021-08-23 NOTE — ED TRIAGE NOTES
Pt called EMS for bowel obstruction. Pt reported having them before and this feels the same. No n/v for EMS.

## 2021-08-23 NOTE — ED PROVIDER NOTES
Emergency Department Patient Sign-out       Brief HPI:  This is a 55 year old male signed out to me by Dr. Castro.  See initial ED Provider note for details of the presentation.     Exam:   Patient Vitals for the past 24 hrs:   BP Temp Pulse Resp SpO2   08/22/21 2242 122/89 98.9  F (37.2  C) 100 16 97 %       ED RESULTS:   Results for orders placed or performed during the hospital encounter of 08/22/21 (from the past 24 hour(s))   EKG 12-lead, tracing only     Status: None (Preliminary result)    Collection Time: 08/22/21 11:01 PM   Result Value Ref Range    Systolic Blood Pressure  mmHg    Diastolic Blood Pressure  mmHg    Ventricular Rate 93 BPM    Atrial Rate 93 BPM    WA Interval 138 ms    QRS Duration 88 ms     ms    QTc 445 ms    P Axis 22 degrees    R AXIS -26 degrees    T Axis -17 degrees    Interpretation ECG Sinus rhythm  Normal ECG      CBC with platelets differential     Status: Abnormal    Collection Time: 08/22/21 11:37 PM    Narrative    The following orders were created for panel order CBC with platelets differential.  Procedure                               Abnormality         Status                     ---------                               -----------         ------                     CBC with platelets and d...[[id number]]  Abnormal            Final result                 Please view results for these tests on the individual orders.   Comprehensive metabolic panel     Status: Abnormal    Collection Time: 08/22/21 11:37 PM   Result Value Ref Range    Sodium 137 133 - 144 mmol/L    Potassium 3.9 3.4 - 5.3 mmol/L    Chloride 104 94 - 109 mmol/L    Carbon Dioxide (CO2) 24 20 - 32 mmol/L    Anion Gap 9 3 - 14 mmol/L    Urea Nitrogen 14 7 - 30 mg/dL    Creatinine 0.76 0.66 - 1.25 mg/dL    Calcium 8.3 (L) 8.5 - 10.1 mg/dL    Glucose 291 (H) 70 - 99 mg/dL    Alkaline Phosphatase 118 40 - 150 U/L    AST 25 0 - 45 U/L    ALT 25 0 - 70 U/L    Protein Total 6.6 (L) 6.8 - 8.8 g/dL    Albumin 3.0  (L) 3.4 - 5.0 g/dL    Bilirubin Total 0.6 0.2 - 1.3 mg/dL    GFR Estimate >90 >60 mL/min/1.73m2   Lipase     Status: Normal    Collection Time: 08/22/21 11:37 PM   Result Value Ref Range    Lipase 184 73 - 393 U/L   Lactic acid whole blood     Status: Abnormal    Collection Time: 08/22/21 11:37 PM   Result Value Ref Range    Lactic Acid 2.7 (H) 0.7 - 2.0 mmol/L   Troponin I     Status: Normal    Collection Time: 08/22/21 11:37 PM   Result Value Ref Range    Troponin I <0.015 0.000 - 0.045 ug/L   CBC with platelets and differential     Status: Abnormal    Collection Time: 08/22/21 11:37 PM   Result Value Ref Range    WBC Count 6.2 4.0 - 11.0 10e3/uL    RBC Count 4.34 (L) 4.40 - 5.90 10e6/uL    Hemoglobin 12.7 (L) 13.3 - 17.7 g/dL    Hematocrit 38.0 (L) 40.0 - 53.0 %    MCV 88 78 - 100 fL    MCH 29.3 26.5 - 33.0 pg    MCHC 33.4 31.5 - 36.5 g/dL    RDW 13.2 10.0 - 15.0 %    Platelet Count 337 150 - 450 10e3/uL    % Neutrophils 50 %    % Lymphocytes 34 %    % Monocytes 10 %    % Eosinophils 4 %    % Basophils 1 %    % Immature Granulocytes 1 %    NRBCs per 100 WBC 0 <1 /100    Absolute Neutrophils 3.2 1.6 - 8.3 10e3/uL    Absolute Lymphocytes 2.1 0.8 - 5.3 10e3/uL    Absolute Monocytes 0.6 0.0 - 1.3 10e3/uL    Absolute Eosinophils 0.2 0.0 - 0.7 10e3/uL    Absolute Basophils 0.0 0.0 - 0.2 10e3/uL    Absolute Immature Granulocytes 0.0 <=0.0 10e3/uL    Absolute NRBCs 0.0 10e3/uL   CT Abdomen Pelvis w Contrast     Status: None    Collection Time: 08/23/21 12:56 AM    Narrative    EXAM: CT ABDOMEN PELVIS W CONTRAST  LOCATION: [facility]  DATE/TIME: 8/23/2021 12:49 AM    INDICATION: Left lower quadrant pain.  COMPARISON: 8/14/2021.  TECHNIQUE: CT scan of the abdomen and pelvis was performed following injection of IV contrast. Multiplanar reformats were obtained. Dose reduction techniques were used.  CONTRAST: 89 mL Iso 370.    FINDINGS:   LOWER CHEST: Normal.    HEPATOBILIARY:  Small stone in the gallbladder.    PANCREAS: Normal.    SPLEEN: Normal.    ADRENAL GLANDS: Normal.    KIDNEYS/BLADDER: No hydronephrosis. The urinary bladder is nearly empty and appears diffusely thick-walled.    BOWEL: The stomach is very distended with air and food debris. No evidence of outlet obstruction. Small and large bowel are nondilated. No obstruction or inflammation. There is a penile prosthesis reservoir in the anterior pelvis.    LYMPH NODES: Normal.    VASCULATURE: Unremarkable.    PELVIC ORGANS: The prostate gland is enlarged.    MUSCULOSKELETAL: Normal.      Impression    IMPRESSION:   1.  The stomach is very distended with air and food debris. No evidence of outlet obstruction. Small and large bowel are within normal limits.  2.  Prostate gland enlargement.  3.  Urinary bladder is nearly empty but appears diffusely thick-walled. No hydronephrosis.   Lactic acid whole blood     Status: Normal    Collection Time: 08/23/21  5:19 AM   Result Value Ref Range    Lactic Acid 1.2 0.7 - 2.0 mmol/L       ED MEDICATIONS:   Medications   ondansetron (ZOFRAN) injection 4 mg (4 mg Intravenous Given 8/22/21 2341)   0.9% sodium chloride BOLUS (0 mLs Intravenous Stopped 8/23/21 0030)   alum & mag hydroxide-simethicone (MAALOX) suspension 30 mL (30 mLs Oral Given 8/22/21 2324)   lidocaine (XYLOCAINE) 2 % solution 10 mL (10 mLs Mouth/Throat Given 8/22/21 2324)   fentaNYL (PF) (SUBLIMAZE) injection 50 mcg (50 mcg Intravenous Given 8/23/21 0014)   sodium chloride 0.9 % bag 500mL for CT scan flush use (62 mLs As instructed Given 8/23/21 0059)   iopamidol (ISOVUE-370) solution 100 mL (89 mLs Intravenous Given 8/23/21 0058)   0.9% sodium chloride BOLUS (1,000 mLs Intravenous New Bag 8/23/21 5557)         Impression:    ICD-10-CM    1. LLQ abdominal pain  R10.32        Plan:    Pending studies include labs, CT scan, and re-evaluate.    I reviewed comprehensive labs which  Are remarkable for wbc 6.2, hemoglobin 12.7, no  acute metabolic or electrolyte abnormality, no transaminitis, normal lipase 184, negative troponin. Repeat lactic acid 1.2 (down from 2.7).  I reviewed CT scan which demonstrates a distended stomach with air and food degrees, no evidence of outlet obstruction, small and large bowels are within normal limits, no evidence of acute infection, no hydronephrosis.  On reevaluation patient resting comfortably, reports improvement of his pain and improvement of his symptoms after IV hydration and IV medications.  Patient feeling much better, tolerated liquids in the emergency department and feels comfortable discharge home.  At this time recommend discharge home with supportive care, liquid diet advance to soft as tolerated.  Encourage close outpatient follow-up and return precautions discussed by fever, severe pain, persistent vomiting, nursing symptoms.  Patient understands and agrees with plan.      MD Damion Benavides Linsey Gail, MD  08/23/21 2916

## 2021-08-23 NOTE — ED NOTES
Bed: ED06  Expected date: 8/22/21  Expected time:   Means of arrival:   Comments:  H441 54 y/o M bowel obstruction

## 2021-08-24 ENCOUNTER — OFFICE VISIT (OUTPATIENT)
Dept: INFECTIOUS DISEASES | Facility: CLINIC | Age: 56
End: 2021-08-24
Attending: INTERNAL MEDICINE
Payer: COMMERCIAL

## 2021-08-24 ENCOUNTER — LAB (OUTPATIENT)
Dept: LAB | Facility: CLINIC | Age: 56
End: 2021-08-24
Attending: INTERNAL MEDICINE
Payer: COMMERCIAL

## 2021-08-24 VITALS
HEIGHT: 64 IN | DIASTOLIC BLOOD PRESSURE: 83 MMHG | TEMPERATURE: 98 F | BODY MASS INDEX: 29.52 KG/M2 | SYSTOLIC BLOOD PRESSURE: 141 MMHG | OXYGEN SATURATION: 97 % | HEART RATE: 110 BPM | WEIGHT: 172.9 LBS

## 2021-08-24 DIAGNOSIS — K21.00 GASTROESOPHAGEAL REFLUX DISEASE WITH ESOPHAGITIS WITHOUT HEMORRHAGE: ICD-10-CM

## 2021-08-24 DIAGNOSIS — B20 HUMAN IMMUNODEFICIENCY VIRUS (HIV) DISEASE (H): ICD-10-CM

## 2021-08-24 DIAGNOSIS — Z79.4 TYPE 2 DIABETES MELLITUS WITH OTHER SPECIFIED COMPLICATION, WITH LONG-TERM CURRENT USE OF INSULIN (H): ICD-10-CM

## 2021-08-24 DIAGNOSIS — Z72.51 HIGH RISK SEXUAL BEHAVIOR, UNSPECIFIED TYPE: ICD-10-CM

## 2021-08-24 DIAGNOSIS — Z11.3 SCREEN FOR STD (SEXUALLY TRANSMITTED DISEASE): ICD-10-CM

## 2021-08-24 DIAGNOSIS — E11.69 TYPE 2 DIABETES MELLITUS WITH OTHER SPECIFIED COMPLICATION, WITH LONG-TERM CURRENT USE OF INSULIN (H): ICD-10-CM

## 2021-08-24 DIAGNOSIS — Z21 HIV INFECTION, UNSPECIFIED SYMPTOM STATUS (H): ICD-10-CM

## 2021-08-24 DIAGNOSIS — B20 HUMAN IMMUNODEFICIENCY VIRUS (HIV) DISEASE (H): Primary | ICD-10-CM

## 2021-08-24 LAB
ALBUMIN SERPL-MCNC: 3.4 G/DL (ref 3.4–5)
ALBUMIN UR-MCNC: NEGATIVE MG/DL
ALP SERPL-CCNC: 164 U/L (ref 40–150)
ALT SERPL W P-5'-P-CCNC: 27 U/L (ref 0–70)
AMYLASE SERPL-CCNC: 50 U/L (ref 30–110)
ANION GAP SERPL CALCULATED.3IONS-SCNC: 6 MMOL/L (ref 3–14)
APPEARANCE UR: CLEAR
AST SERPL W P-5'-P-CCNC: 12 U/L (ref 0–45)
BASOPHILS # BLD AUTO: 0.1 10E3/UL (ref 0–0.2)
BASOPHILS NFR BLD AUTO: 1 %
BILIRUB SERPL-MCNC: 0.4 MG/DL (ref 0.2–1.3)
BILIRUB UR QL STRIP: NEGATIVE
BUN SERPL-MCNC: 13 MG/DL (ref 7–30)
CALCIUM SERPL-MCNC: 9.2 MG/DL (ref 8.5–10.1)
CHLORIDE BLD-SCNC: 105 MMOL/L (ref 94–109)
CO2 SERPL-SCNC: 26 MMOL/L (ref 20–32)
COLOR UR AUTO: ABNORMAL
CREAT SERPL-MCNC: 0.79 MG/DL (ref 0.66–1.25)
EOSINOPHIL # BLD AUTO: 0.2 10E3/UL (ref 0–0.7)
EOSINOPHIL NFR BLD AUTO: 3 %
ERYTHROCYTE [DISTWIDTH] IN BLOOD BY AUTOMATED COUNT: 13.1 % (ref 10–15)
GFR SERPL CREATININE-BSD FRML MDRD: >90 ML/MIN/1.73M2
GLUCOSE BLD-MCNC: 399 MG/DL (ref 70–99)
GLUCOSE UR STRIP-MCNC: >499 MG/DL
HBA1C MFR BLD: 10.3 % (ref 0–5.6)
HCT VFR BLD AUTO: 39.1 % (ref 40–53)
HGB BLD-MCNC: 13.4 G/DL (ref 13.3–17.7)
HGB UR QL STRIP: NEGATIVE
IMM GRANULOCYTES # BLD: 0.1 10E3/UL
IMM GRANULOCYTES NFR BLD: 1 %
KETONES UR STRIP-MCNC: NEGATIVE MG/DL
LEUKOCYTE ESTERASE UR QL STRIP: NEGATIVE
LIPASE SERPL-CCNC: 223 U/L (ref 73–393)
LYMPHOCYTES # BLD AUTO: 2.3 10E3/UL (ref 0.8–5.3)
LYMPHOCYTES NFR BLD AUTO: 30 %
MCH RBC QN AUTO: 29.5 PG (ref 26.5–33)
MCHC RBC AUTO-ENTMCNC: 34.3 G/DL (ref 31.5–36.5)
MCV RBC AUTO: 86 FL (ref 78–100)
MONOCYTES # BLD AUTO: 0.6 10E3/UL (ref 0–1.3)
MONOCYTES NFR BLD AUTO: 8 %
NEUTROPHILS # BLD AUTO: 4.3 10E3/UL (ref 1.6–8.3)
NEUTROPHILS NFR BLD AUTO: 57 %
NITRATE UR QL: NEGATIVE
NRBC # BLD AUTO: 0 10E3/UL
NRBC BLD AUTO-RTO: 0 /100
PH UR STRIP: 6 [PH] (ref 5–7)
PLATELET # BLD AUTO: 341 10E3/UL (ref 150–450)
POTASSIUM BLD-SCNC: 4.1 MMOL/L (ref 3.4–5.3)
PROT SERPL-MCNC: 7.2 G/DL (ref 6.8–8.8)
RBC # BLD AUTO: 4.55 10E6/UL (ref 4.4–5.9)
RBC URINE: <1 /HPF
SODIUM SERPL-SCNC: 137 MMOL/L (ref 133–144)
SP GR UR STRIP: 1.03 (ref 1–1.03)
UROBILINOGEN UR STRIP-MCNC: NORMAL MG/DL
WBC # BLD AUTO: 7.5 10E3/UL (ref 4–11)
WBC URINE: <1 /HPF

## 2021-08-24 PROCEDURE — 80053 COMPREHEN METABOLIC PANEL: CPT | Performed by: PATHOLOGY

## 2021-08-24 PROCEDURE — 87591 N.GONORRHOEAE DNA AMP PROB: CPT | Performed by: INTERNAL MEDICINE

## 2021-08-24 PROCEDURE — 80061 LIPID PANEL: CPT | Performed by: PATHOLOGY

## 2021-08-24 PROCEDURE — 87491 CHLMYD TRACH DNA AMP PROBE: CPT | Performed by: INTERNAL MEDICINE

## 2021-08-24 PROCEDURE — 81001 URINALYSIS AUTO W/SCOPE: CPT | Performed by: PATHOLOGY

## 2021-08-24 PROCEDURE — 99417 PROLNG OP E/M EACH 15 MIN: CPT | Performed by: INTERNAL MEDICINE

## 2021-08-24 PROCEDURE — 36415 COLL VENOUS BLD VENIPUNCTURE: CPT | Performed by: PATHOLOGY

## 2021-08-24 PROCEDURE — 87536 HIV-1 QUANT&REVRSE TRNSCRPJ: CPT | Performed by: INTERNAL MEDICINE

## 2021-08-24 PROCEDURE — 83036 HEMOGLOBIN GLYCOSYLATED A1C: CPT | Performed by: PATHOLOGY

## 2021-08-24 PROCEDURE — 82150 ASSAY OF AMYLASE: CPT | Performed by: PATHOLOGY

## 2021-08-24 PROCEDURE — 99215 OFFICE O/P EST HI 40 MIN: CPT | Performed by: INTERNAL MEDICINE

## 2021-08-24 PROCEDURE — 86780 TREPONEMA PALLIDUM: CPT | Performed by: INTERNAL MEDICINE

## 2021-08-24 PROCEDURE — 86359 T CELLS TOTAL COUNT: CPT | Performed by: INTERNAL MEDICINE

## 2021-08-24 PROCEDURE — 85025 COMPLETE CBC W/AUTO DIFF WBC: CPT | Performed by: PATHOLOGY

## 2021-08-24 PROCEDURE — G0463 HOSPITAL OUTPT CLINIC VISIT: HCPCS

## 2021-08-24 PROCEDURE — 83690 ASSAY OF LIPASE: CPT | Performed by: PATHOLOGY

## 2021-08-24 PROCEDURE — 86803 HEPATITIS C AB TEST: CPT | Performed by: INTERNAL MEDICINE

## 2021-08-24 RX ORDER — PANTOPRAZOLE SODIUM 40 MG/1
40 TABLET, DELAYED RELEASE ORAL DAILY
Qty: 30 TABLET | Refills: 6 | Status: ON HOLD | OUTPATIENT
Start: 2021-08-24 | End: 2022-06-07

## 2021-08-24 ASSESSMENT — PAIN SCALES - GENERAL: PAINLEVEL: NO PAIN (0)

## 2021-08-24 ASSESSMENT — MIFFLIN-ST. JEOR: SCORE: 1520.27

## 2021-08-24 NOTE — LETTER
8/24/2021      RE: Andrew Lockwood  [address] [place] [address]       [facility]  General Infectious Diseases/HIV Progress Note     Patient:  Andrew Lockwood, Date of birth 1965, Medical record number [id number]  Date of Visit:  08/24/2021    Assessment and Plan:     1. HIV: diagnosed w/ HIV in 4/2015. CD4 at the time of diagnosis was 26/2% and VL 171K. No genotypic resistance at the time of diagnosis. Previous regimens have included Triumeq and Genvoya. Has been on Biktarvy since 7/2019. Tolerating medication and has good adherence. In June 2021 VL UD and CD4 540/21%. Today was our first visit so I will need to address more specific problems (listed below) at the the next.   Medications: continue Biktarvy-refills provided  Prophy:CD4 >200-none at this time  Surveillance labs today     2. 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 2 rim enhancing lesions in the cerebellum/pedicle (biopsy). Received clindamycin and pyrimethamine. Eventually transitioned to bactrim for secondary toxo prophy and PJP prophy. Tmp/smx stopped 11/2019.  Historically he has had memory issues after this diagnosis.     3. Type II DM on insulin: has insulin on him today. Will obtain labs including HA1c today. No retinopathy observed on 2018 optho exam. He is suppose to receive long acting and short acting insulin. Has not seen endocrinology since 8/24/20.     4. Chronic abdominal pain and Grade A esophagitis: history of mild small bowel obstruction 3/7/20, 11/11/20 and 7/13/21. Recurrent ED presentations.Most recent 8/23/21 CT A/P showed a distended stomach w/o outlet obstruction and prostate enlargement. Last colonoscopy 2016-normal appearing colon. Small bowel enteroscopy showed grade A reflux esophagitis. Omeprazole has not been helpful. Will change omeprazole to pantoprazole. Given the history of esophagitis and ongoing GERD symptoms he would be a candidate to repeat an EGD. We  will discuss this at next visit.    5. Penile prosthesis     6. Cholelithiasis and Diverticulosis: seen on CTs    7. LSIL: diagnosed 6/22/18. Follows with CRS but exams and follow up has not been consistent. 9/9/20 anoscopy and fulguration in the OR. 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 No invasive malignancy. Recommend follow up.     Health care maintenance:   A. Vaccination/Serology status.   -Hepatitis A: IgG+  -Hepatitis B: nonimmune  -Will need to discuss vaccinations at next visit-Pneumovax, Prevnar, TDaP, Zoster, Meningococcal and Influenza   -COVID received 2 doses of Pfeizer  B. Routine Infectious Disease screening.   -Hepatitis C: screen today  -Toxo IgG: positive 2015  -CMV IgG positive   -TB screen: 2015 negative  -STD sceen: GC/CT/RPR-screen today  -Dexa: discuss at next visit  C. Cardiovascular, renal and bone health.   -Cholesterol: obtain lipid panel  -Blood pressure:  Above goal >130/80  -Smoking status: none               -Weight: BMI 29.6  D. Cancer screening (if applicable)  -Anoscopy: see above  -Colonoscopy: 2016    Follow up: 6 months.     70 minutes spent on the date of the encounter doing chart review, review of test results, interpretation of tests, patient visit and documentation     Benita Ivory DO.   Pager [phone]  Infectious Diseases        Interval History:   Last seen virtually in 8/2020. He has been receiving refills of Biktarvy and endorses adherence. Does not miss doses.   He is currently homeless. During COVID he has not been going to shelters. He typically sleeps on benches, under bridges or with friends. During the winter he stayed with a friend. Community case workers and Simon Cadena are trying to get Andrew stable housing. Since patient was diagnosed with HIV he has struggled with housing instability.     Currently not employed. He said he was fired ~ 6 years ago when he was diagnosed with HIV-he was working as a  in a temp agency.   Does  not drink alcohol or use IV drugs.  Sexually active with 20 partners in the past year. Wears condoms. Has not had STD testing recently. Denies history of STDs. Agreeable to STI testing today.  Received 2 doses of Pfizer vaccine    Denies subjective fevers, chills, night sweats, weight loss, ear pain, nausea, vomiting, diarrhea, melena. Has poor dentition but no pain in teeth currently. Has recurrent admissions for abdominal pain. Patient notices that he has intermittent abdominal pain that is worse with spicy food. He has been on omeprazole w/o benefit x 2 years.     ID history:  Moved from [place] to [place] in 1993. Lived in [place] for 2 years.   Diagnosed with HIV 4/2015. CD4 at the time of diagnosis was 24/2% and , 654. At the time of HIV diagnosis he was also diagnosed with CNS Toxoplasmosis.He had 2 rim enhancing lesions in the cerebellum/pedicle. Underwent biopsy and diagnosed with toxo. Received clindamycin and pyrimethamine. Eventually transitioned to bactrim secondary toxo prophy and PJP prophy. Approximated time of HIV acquisition ~ 2008. 05/7/15 started Triumeq  11/2015 stopped medications  12/31/15 started Genvoya  7/19/19 switched Genvoya ro Biktarvy    Review of Systems:Remaining systems all reviewed and negative.    Medications:  Current Outpatient Medications   Medication Sig Dispense Refill     alcohol swab prep pads Use to swab area of injection/todd as directed. 100 each 3     bictegravir-emtricitabine-tenofovir (BIKTARVY) -25 MG per tablet Take 1 tablet by mouth daily 30 tablet 6     blood glucose (NO BRAND SPECIFIED) lancets standard To use to test glucose level in the blood. Use to test blood sugar  4  times daily as directed. To accompany glucose monitor brands per insurance coverage. 100 each 0     blood glucose (NO BRAND SPECIFIED) lancets standard Use to test blood sugar four times daily or as directed. 100 each 0     blood glucose (NO BRAND SPECIFIED) test strip To use to  test glucose level in the blood. Use to test blood sugar 4 times daily as directed. To accompany glucose monitor brands per insurance coverage. 200 strip 0     blood glucose (NO BRAND SPECIFIED) test strip 1 strip by In Vitro route 4 times daily (before meals and nightly) Use to test blood sugars 4 times daily or as directed 100 strip 11     blood glucose (NO BRAND SPECIFIED) test strip Use to test blood sugar 4 times daily or as directed. To accompany:whatever is covered 360 strip 3     blood glucose (NO BRAND SPECIFIED) test strip Use to test blood sugar 4-6 times daily or as directed. To accompany: Blood Glucose Monitor Brands: per insurance. 100 strip 6     blood glucose calibration (NO BRAND SPECIFIED) solution Used to calibrate the blood glucose monitor as needed and as directed.  To accompany  blood glucose brands per insurance coverage 1 each 0     blood glucose calibration (NO BRAND SPECIFIED) solution To accompany: whatever is covered 1 each 0     blood glucose calibration (NO BRAND SPECIFIED) solution To accompany: Blood Glucose Monitor Brands: per insurance. 1 each 0     blood glucose monitoring (NO BRAND SPECIFIED) meter device kit Use as directed. Per insurance coverage. 1 kit 0     blood glucose monitoring (NO BRAND SPECIFIED) meter device kit Use to test blood sugar 4 times daily or as directed. 1 kit 0     blood glucose monitoring (NO BRAND SPECIFIED) meter device kit Use to test blood sugar 4 times daily or as directed. Preferred blood glucose meter OR supplies to accompany: whatever is covered 1 kit 0     blood glucose monitoring (NO BRAND SPECIFIED) meter device kit Use to test blood sugar 4-6 times daily or as directed. Preferred blood glucose meter OR supplies to accompany: Blood Glucose Monitor Brands: per insurance. 1 kit 0     famotidine (PEPCID) 20 MG tablet Take 1 tablet (20 mg) by mouth 2 times daily 30 tablet 11     glucose 40 % (400 mg/mL) gel 15 g every 15 minutes by mouth as needed  "for low blood sugar. Oral gel is preferable for conscious and able to swallow patient. 112.5 g 0     insulin detemir (LEVEMIR PEN) 100 UNIT/ML pen Inject 35 Units Subcutaneous 2 times daily 9 mL 0     insulin pen needle (32G X 4 MM) 32G X 4 MM miscellaneous Use as directed by provider. Per insurance coverage 100 each 0     insulin pen needle (B-D U/F) 31G X 8 MM miscellaneous Use 1 pen needles daily or as directed. 100 each 3     insulin pen needle (BD SCOTT U/F) 32G X 4 MM miscellaneous USE 3 TO 4 NEEDLES DAILY 400 each 3     Lancets (ONETOUCH DELICA PLUS [id number]) MISC USE TO TEST BLOOD SUGAR FOUR TIMES DAILY OR AS DIRECTED 100 each 11     NOVOLOG FLEXPEN 100 UNIT/ML soln Inject 20 units with each meal, along with sliding scale 1 unit per 25mg/dL over 140 before meals and over 200 at bedtime. Max daily dose 90 units 75 mL 0     pantoprazole (PROTONIX) 40 MG EC tablet Take 1 tablet (40 mg) by mouth daily 30 tablet 6     SENNA-docusate sodium (SENNA S) 8.6-50 MG tablet Take 1 tablet by mouth 2 times daily Hold for loose stools. 60 tablet 5     Sharps Container MISC Use as directed to dispose of needles, lancets and other sharps. Per Insurance coverage 1 each 0     STATIN NOT PRESCRIBED (INTENTIONAL) Please choose reason not prescribed, below       thin (NO BRAND SPECIFIED) lancets Use with lanceting device. To accompany: Test 4 times daily with whatever is covered 360 each 3     thin (NO BRAND SPECIFIED) lancets Use with lanceting device. To accompany: Blood Glucose Monitor Brands: per insurance. 100 each 6       Allergies   Allergen Reactions     Metformin      Abdominal pain     Milk [Lac Bovis] Hives     Tylenol [Acetaminophen] Itching     Dulaglutide Rash     Penicillin V Other (See Comments) and Rash     Diffuse maculopapular rash + feels \"high\", per pt.        Immunizations:  Immunization History   Administered Date(s) Administered     HepB 03/10/2016, 06/16/2016     HepB-Adult 08/31/2017     Influenza (IIV3) " PF 10/17/2016     Influenza Quad, Recombinant, p-free (RIV4) 01/17/2020, 09/28/2020     Influenza Vaccine IM > 6 months Valent IIV4 12/22/2015, 12/14/2017     Mantoux Tuberculin Skin Test 04/12/2015     Meningococcal (Menactra ) 08/31/2015, 03/10/2016     Pneumo Conj 13-V (2010&after) 03/10/2016     Pneumococcal 23 valent 06/16/2016     TDAP Vaccine (Boostrix) 10/17/2016       Exam:  B/P: 141/83, T: 98, P: 110, R: Data Unavailable, Weight:   Wt Readings from Last 2 Encounters:   08/24/21 78.4 kg (172 lb 14.4 oz)   08/13/21 81.6 kg (180 lb)       Physical Examination:  GENERAL: in no acute distress. Fidgety.    HEAD:  Head is normocephalic, atraumatic   EYES:  Eyes have anicteric sclerae without conjunctival injection. Pupils reactive bilaterally.   ENT:  Oropharynx is moist without exudates or ulcers. Tongue is midline. No sinus tenderness. Poor dentition. EAC and TM clear.   NECK:  Supple. No cervical lymphadenopathy  LUNGS:  Clear to auscultation bilaterally. No accessory muscle use. Not on oxygen.   CARDIOVASCULAR:  Regular rate and rhythm with no murmurs  ABDOMEN:  Normal bowel sounds, soft, non-tender, non-distended.  SKIN:  No acute rashes.  Healed scars on abdomen.   Rectal: swabs obtained for GC/CT. Small external hemorrhoid noted.  EXTREMITIES: No joint erythema or swelling.   NEUROLOGIC:  Alert. Oriented. Grossly nonfocal. Active x4 extremities          Laboratory Data:   Date  CD4  VL  4/12/15  26/2%  171K  6/29/15  141/4%  <20  10/6/15  63/2%  75K  1/10/16  25/2%  132K  5/20/16  94/4%  <20  3/7/18  256/10% 18K  4/26/18  299/11%  <20  6/15/21  540/21% <20    T Cell Subset:  Absolute CD4   Date Value Ref Range Status   06/15/2021 540 441 - 2,156 cells/uL Final     CD4 [place] T   Date Value Ref Range Status   06/15/2021 21 (L) 28 - 63 % Final     CD8 Suppressor T   Date Value Ref Range Status   06/15/2021 36 10 - 40 % Final     CD3 Mature T   Date Value Ref Range Status   06/15/2021 59 49 - 84 % Final      CD4:CD8 Ratio   Date Value Ref Range Status   06/15/2021 0.58 (L) 1.40 - 2.60 Final     WBC   Date Value Ref Range Status   06/24/2021 9.6 4.0 - 11.0 10e9/L Final     WBC Count   Date Value Ref Range Status   08/24/2021 7.5 4.0 - 11.0 10e3/uL Final     % Lymphocytes   Date Value Ref Range Status   08/24/2021 30 % Final   06/24/2021 29.4 % Final     Absolute CD3   Date Value Ref Range Status   06/15/2021 1,477 603 - 2,990 cells/uL Final     Absolute CD8   Date Value Ref Range Status   06/15/2021 909 125 - 1,312 cells/uL Final       HIV-1 RNA Quantitative:  HIV-1 RNA Quant Result   Date Value Ref Range Status   06/15/2021 HIV-1 RNA Not Detected HIVND^HIV-1 RNA Not Detected [Copies]/mL Final     Comment:     The DAMON AmpliPrep/DAMON TaqMan HIV-1 test is an FDA-approved in vitro   nucleic acid amplification test for the quantitation of HIV-1 RNA in human   plasma (EDTA plasma) using the DAMON AmpliPrep instrument for automated viral   nucleic acid extraction and the DAMON TaqMan Analyzer or DAMON TaqMan for   automated Real Time PCR amplification and detection of the viral nucleic acid   target.  Titer results are reported in copies/ml. This assay is intended for use in   conjunction with clinical presentation and other laboratory markers of disease   prognosis and for use as an aid in assessing viral response to antiretroviral   treatment as measured by changes in plasma HIV-1 RNA levels. This test should   not be used as a donor screening test to confirm the presence of HIV-1   infection.         Metabolic Studies       Recent Labs   Lab Test 08/24/21  1622 08/23/21  0519 08/22/21  2337 06/24/21  2055 06/24/21  1931 09/25/20  0716 09/24/20  1857     --  137   < > 136  --  133   POTASSIUM 4.1  --  3.9   < > 3.2*  --  3.5   CHLORIDE 105  --  104   < > 107  --  103   CO2 26  --  24   < > 20  --  23   ANIONGAP 6  --  9   < > 9  --  7   BUN 13  --  14   < > 18  --  15   CR 0.79  --  0.76   < > 0.81  --  0.91    GFRESTIMATED >90  --  >90   < > >90  --  >90   *  --  291*   < > 220*  --  202*   A1C 10.3*  --   --    < >  --   --   --    LINDA 9.2  --  8.3*   < > 7.3*  --  9.0   PHOS  --   --   --   --  2.6  --   --    MAG  --   --   --   --  1.7  --   --    LACT  --  1.2 2.7*   < >  --    < >  --    PCAL  --   --   --   --   --   --  <0.05   CKT  --   --   --   --  118  --   --     < > = values in this interval not displayed.       Hepatic Studies    Recent Labs   Lab Test 08/24/21 1622 08/22/21 2337 08/13/21 2016 07/24/16  2355 07/21/16  0521 01/13/16  0927   BILITOTAL 0.4 0.6 0.4   < > 0.4  --    DBIL  --   --   --   --  <0.1  --    ALKPHOS 164* 118 196*   < > 99  --    PROTTOTAL 7.2 6.6* 8.0   < > 7.2  --    ALBUMIN 3.4 3.0* 3.7   < > 3.7  --    AST 12 25 16   < > 16  --    ALT 27 25 33   < > 16  --    LDH  --   --   --   --   --  217    < > = values in this interval not displayed.       Hematology Studies      Recent Labs   Lab Test 08/24/21 1622 08/22/21 2337 08/13/21 2016 07/17/21  0746 07/16/21  0733 07/15/21  0925 06/24/21  1931 06/15/21  1153 06/15/21  1153   WBC 7.5 6.2 7.9 6.3 6.5 5.6 9.6   < > 7.4   ANEU  --   --   --   --   --   --  5.6  --  4.1   ALYM  --   --   --   --   --   --  2.8  --  2.5   BRITT  --   --   --   --   --   --  0.9  --  0.5   AEOS  --   --   --   --   --   --  0.1  --  0.2   HGB 13.4 12.7* 14.4 12.9* 12.7* 12.5* 13.5   < > 14.3   HCT 39.1* 38.0* 42.0 37.5* 37.6* 37.6* 39.5*   < > 41.7    337 392 326 314 293 386   < > 355    < > = values in this interval not displayed.     Iron Testing    Recent Labs   Lab Test 08/24/21  1622 09/27/20  0614 09/26/20  0643   IRON  --   --  26*   FEB  --   --  324   IRONSAT  --   --  8*   CINDY  --   --  186   MCV 86   < > 93   FOLIC  --   --  24.0   B12  --   --  320    < > = values in this interval not displayed.       Thyroid Studies     Recent Labs   Lab Test 09/25/20  0716 02/11/20  1419 02/15/18  2158 05/23/17  1247 04/14/15  0711   TSH  0.87 2.66 3.57 3.90 2.78   T4  --   --   --   --  1.02       Microbiology:  Last Culture results:  Culture Micro   Date Value Ref Range Status   09/26/2020 No growth  Final   09/26/2020 No growth  Final   09/24/2020 Moderate growth  Normal sushma    Final   09/24/2020 No growth  Final   09/24/2020 No growth  Final   09/24/2020 No growth  Final   02/25/2020 (A)  Final    Moderate growth  Methicillin resistant Staphylococcus aureus (MRSA)     10/26/2019 No growth  Final   10/26/2019 No growth  Final   10/04/2018 (A)  Final    Moderate growth  Staphylococcus aureus  This isolate DOES NOT demonstrate inducible clindamycin resistance in vitro. Clindamycin   is susceptible and could be used when indicated, however, erythromycin is resistant and   should not be used.     02/05/2018 Moderate growth  Escherichia coli   (A)  Final   02/05/2018 Heavy growth  Streptococcus anginosus   (A)  Final   02/05/2018 Heavy growth  Mixed gram positive sushma    Final   02/05/2018 (A)  Final    Heavy growth  Bacteroides fragilis group  Susceptibility testing not routinely done     02/05/2018 (A)  Final    Heavy growth  Bacteroides fragilis  Susceptibility testing not routinely done     02/05/2018 (A)  Final    Plus  Heavy growth  Mixed aerobic and anaerobic sushma     02/05/2018 Canceled, Test credited  Final   02/05/2018 Incorrectly ordered by PCU/Clinic  Final   02/05/2018 Test reordered as correct code  Final   02/05/2018 Culture negative after 4 weeks  Final        Imaging:  Recent Results (from the past 48 hour(s))   CT Abdomen Pelvis w Contrast    Narrative    EXAM: CT ABDOMEN PELVIS W CONTRAST  LOCATION: [facility]  DATE/TIME: 8/23/2021 12:49 AM    INDICATION: Left lower quadrant pain.  COMPARISON: 8/14/2021.  TECHNIQUE: CT scan of the abdomen and pelvis was performed following injection of IV contrast. Multiplanar reformats were obtained. Dose reduction techniques were used.  CONTRAST: 89  mL Iso 370.    FINDINGS:   LOWER CHEST: Normal.    HEPATOBILIARY: Small stone in the gallbladder.    PANCREAS: Normal.    SPLEEN: Normal.    ADRENAL GLANDS: Normal.    KIDNEYS/BLADDER: No hydronephrosis. The urinary bladder is nearly empty and appears diffusely thick-walled.    BOWEL: The stomach is very distended with air and food debris. No evidence of outlet obstruction. Small and large bowel are nondilated. No obstruction or inflammation. There is a penile prosthesis reservoir in the anterior pelvis.    LYMPH NODES: Normal.    VASCULATURE: Unremarkable.    PELVIC ORGANS: The prostate gland is enlarged.    MUSCULOSKELETAL: Normal.      Impression    IMPRESSION:   1.  The stomach is very distended with air and food debris. No evidence of outlet obstruction. Small and large bowel are within normal limits.  2.  Prostate gland enlargement.  3.  Urinary bladder is nearly empty but appears diffusely thick-walled. No hydronephrosis.

## 2021-08-24 NOTE — LETTER
8/24/2021       RE: Andrew Lockwood  [address] [place] [address]     Dear Colleague,    Thank you for referring your patient, Andrew Lockwood, to the [facility] INFECTIOUS DISEASE [facility]IN[facility] [place] at [facility]. Please see a copy of my visit note below.    [facility]  General Infectious Diseases/HIV Progress Note     Patient:  Andrew Lockwood, Date of birth 1965, Medical record number [id number]  Date of Visit:  08/24/2021    Assessment and Plan:     1. HIV: diagnosed w/ HIV in 4/2015. CD4 at the time of diagnosis was 26/2% and VL 171K. No genotypic resistance at the time of diagnosis. Previous regimens have included Triumeq and Genvoya. Has been on Biktarvy since 7/2019. Tolerating medication and has good adherence. In June 2021 VL UD and CD4 540/21%. Today was our first visit so I will need to address more specific problems (listed below) at the the next.   Medications: continue Biktarvy-refills provided  Prophy:CD4 >200-none at this time  Surveillance labs today     2. 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 2 rim enhancing lesions in the cerebellum/pedicle (biopsy). Received clindamycin and pyrimethamine. Eventually transitioned to bactrim for secondary toxo prophy and PJP prophy. Tmp/smx stopped 11/2019.  Historically he has had memory issues after this diagnosis.     3. Type II DM on insulin: has insulin on him today. Will obtain labs including HA1c today. No retinopathy observed on 2018 optho exam. He is suppose to receive long acting and short acting insulin. Has not seen endocrinology since 8/24/20.     4. Chronic abdominal pain and Grade A esophagitis: history of mild small bowel obstruction 3/7/20, 11/11/20 and 7/13/21. Recurrent ED presentations.Most recent 8/23/21 CT A/P showed a distended stomach w/o outlet obstruction and prostate enlargement. Last colonoscopy 2016-normal appearing  colon. Small bowel enteroscopy showed grade A reflux esophagitis. Omeprazole has not been helpful. Will change omeprazole to pantoprazole. Given the history of esophagitis and ongoing GERD symptoms he would be a candidate to repeat an EGD. We will discuss this at next visit.    5. Penile prosthesis     6. Cholelithiasis and Diverticulosis: seen on CTs    7. LSIL: diagnosed 6/22/18. Follows with CRS but exams and follow up has not been consistent. 9/9/20 anoscopy and fulguration in the OR. 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 No invasive malignancy. Recommend follow up.     Health care maintenance:   A. Vaccination/Serology status.   -Hepatitis A: IgG+  -Hepatitis B: nonimmune  -Will need to discuss vaccinations at next visit-Pneumovax, Prevnar, TDaP, Zoster, Meningococcal and Influenza   -COVID received 2 doses of Pfeizer  B. Routine Infectious Disease screening.   -Hepatitis C: screen today  -Toxo IgG: positive 2015  -CMV IgG positive   -TB screen: 2015 negative  -STD sceen: GC/CT/RPR-screen today  -Dexa: discuss at next visit  C. Cardiovascular, renal and bone health.   -Cholesterol: obtain lipid panel  -Blood pressure:  Above goal >130/80  -Smoking status: none               -Weight: BMI 29.6  D. Cancer screening (if applicable)  -Anoscopy: see above  -Colonoscopy: 2016    Follow up: 6 months.     70 minutes spent on the date of the encounter doing chart review, review of test results, interpretation of tests, patient visit and documentation     Benita Ivory DO.   Pager [phone]  Infectious Diseases        Interval History:   Last seen virtually in 8/2020. He has been receiving refills of Biktarvy and endorses adherence. Does not miss doses.   He is currently homeless. During COVID he has not been going to shelters. He typically sleeps on benches, under bridges or with friends. During the winter he stayed with a friend. Community case workers and Simon Cadena are trying to get Andrew stable  housing. Since patient was diagnosed with HIV he has struggled with housing instability.     Currently not employed. He said he was fired ~ 6 years ago when he was diagnosed with HIV-he was working as a  in a temp agency.   Does not drink alcohol or use IV drugs.  Sexually active with 20 partners in the past year. Wears condoms. Has not had STD testing recently. Denies history of STDs. Agreeable to STI testing today.  Received 2 doses of Pfizer vaccine    Denies subjective fevers, chills, night sweats, weight loss, ear pain, nausea, vomiting, diarrhea, melena. Has poor dentition but no pain in teeth currently. Has recurrent admissions for abdominal pain. Patient notices that he has intermittent abdominal pain that is worse with spicy food. He has been on omeprazole w/o benefit x 2 years.     ID history:  Moved from [place] to [place] in 1993. Lived in [place] for 2 years.   Diagnosed with HIV 4/2015. CD4 at the time of diagnosis was 24/2% and , 654. At the time of HIV diagnosis he was also diagnosed with CNS Toxoplasmosis.He had 2 rim enhancing lesions in the cerebellum/pedicle. Underwent biopsy and diagnosed with toxo. Received clindamycin and pyrimethamine. Eventually transitioned to bactrim secondary toxo prophy and PJP prophy. Approximated time of HIV acquisition ~ 2008. 05/7/15 started Triumeq  11/2015 stopped medications  12/31/15 started Genvoya  7/19/19 switched Genvoya ro Biktarvy    Review of Systems:Remaining systems all reviewed and negative.    Medications:  Current Outpatient Medications   Medication Sig Dispense Refill     alcohol swab prep pads Use to swab area of injection/todd as directed. 100 each 3     bictegravir-emtricitabine-tenofovir (BIKTARVY) -25 MG per tablet Take 1 tablet by mouth daily 30 tablet 6     blood glucose (NO BRAND SPECIFIED) lancets standard To use to test glucose level in the blood. Use to test blood sugar  4  times daily as directed. To accompany  glucose monitor brands per insurance coverage. 100 each 0     blood glucose (NO BRAND SPECIFIED) lancets standard Use to test blood sugar four times daily or as directed. 100 each 0     blood glucose (NO BRAND SPECIFIED) test strip To use to test glucose level in the blood. Use to test blood sugar 4 times daily as directed. To accompany glucose monitor brands per insurance coverage. 200 strip 0     blood glucose (NO BRAND SPECIFIED) test strip 1 strip by In Vitro route 4 times daily (before meals and nightly) Use to test blood sugars 4 times daily or as directed 100 strip 11     blood glucose (NO BRAND SPECIFIED) test strip Use to test blood sugar 4 times daily or as directed. To accompany:whatever is covered 360 strip 3     blood glucose (NO BRAND SPECIFIED) test strip Use to test blood sugar 4-6 times daily or as directed. To accompany: Blood Glucose Monitor Brands: per insurance. 100 strip 6     blood glucose calibration (NO BRAND SPECIFIED) solution Used to calibrate the blood glucose monitor as needed and as directed.  To accompany  blood glucose brands per insurance coverage 1 each 0     blood glucose calibration (NO BRAND SPECIFIED) solution To accompany: whatever is covered 1 each 0     blood glucose calibration (NO BRAND SPECIFIED) solution To accompany: Blood Glucose Monitor Brands: per insurance. 1 each 0     blood glucose monitoring (NO BRAND SPECIFIED) meter device kit Use as directed. Per insurance coverage. 1 kit 0     blood glucose monitoring (NO BRAND SPECIFIED) meter device kit Use to test blood sugar 4 times daily or as directed. 1 kit 0     blood glucose monitoring (NO BRAND SPECIFIED) meter device kit Use to test blood sugar 4 times daily or as directed. Preferred blood glucose meter OR supplies to accompany: whatever is covered 1 kit 0     blood glucose monitoring (NO BRAND SPECIFIED) meter device kit Use to test blood sugar 4-6 times daily or as directed. Preferred blood glucose meter OR  supplies to accompany: Blood Glucose Monitor Brands: per insurance. 1 kit 0     famotidine (PEPCID) 20 MG tablet Take 1 tablet (20 mg) by mouth 2 times daily 30 tablet 11     glucose 40 % (400 mg/mL) gel 15 g every 15 minutes by mouth as needed for low blood sugar. Oral gel is preferable for conscious and able to swallow patient. 112.5 g 0     insulin detemir (LEVEMIR PEN) 100 UNIT/ML pen Inject 35 Units Subcutaneous 2 times daily 9 mL 0     insulin pen needle (32G X 4 MM) 32G X 4 MM miscellaneous Use as directed by provider. Per insurance coverage 100 each 0     insulin pen needle (B-D U/F) 31G X 8 MM miscellaneous Use 1 pen needles daily or as directed. 100 each 3     insulin pen needle (BD SCOTT U/F) 32G X 4 MM miscellaneous USE 3 TO 4 NEEDLES DAILY 400 each 3     Lancets (ONETOUCH DELICA PLUS TOMFOL78I) MISC USE TO TEST BLOOD SUGAR FOUR TIMES DAILY OR AS DIRECTED 100 each 11     NOVOLOG FLEXPEN 100 UNIT/ML soln Inject 20 units with each meal, along with sliding scale 1 unit per 25mg/dL over 140 before meals and over 200 at bedtime. Max daily dose 90 units 75 mL 0     pantoprazole (PROTONIX) 40 MG EC tablet Take 1 tablet (40 mg) by mouth daily 30 tablet 6     SENNA-docusate sodium (SENNA S) 8.6-50 MG tablet Take 1 tablet by mouth 2 times daily Hold for loose stools. 60 tablet 5     Sharps Container MISC Use as directed to dispose of needles, lancets and other sharps. Per Insurance coverage 1 each 0     STATIN NOT PRESCRIBED (INTENTIONAL) Please choose reason not prescribed, below       thin (NO BRAND SPECIFIED) lancets Use with lanceting device. To accompany: Test 4 times daily with whatever is covered 360 each 3     thin (NO BRAND SPECIFIED) lancets Use with lanceting device. To accompany: Blood Glucose Monitor Brands: per insurance. 100 each 6       Allergies   Allergen Reactions     Metformin      Abdominal pain     Milk [Lac Bovis] Hives     Tylenol [Acetaminophen] Itching     Dulaglutide Rash     Penicillin V  "Other (See Comments) and Rash     Diffuse maculopapular rash + feels \"high\", per pt.        Immunizations:  Immunization History   Administered Date(s) Administered     HepB 03/10/2016, 06/16/2016     HepB-Adult 08/31/2017     Influenza (IIV3) PF 10/17/2016     Influenza Quad, Recombinant, p-free (RIV4) 01/17/2020, 09/28/2020     Influenza Vaccine IM > 6 months Valent IIV4 12/22/2015, 12/14/2017     Mantoux Tuberculin Skin Test 04/12/2015     Meningococcal (Menactra ) 08/31/2015, 03/10/2016     Pneumo Conj 13-V (2010&after) 03/10/2016     Pneumococcal 23 valent 06/16/2016     TDAP Vaccine (Boostrix) 10/17/2016       Exam:  B/P: 141/83, T: 98, P: 110, R: Data Unavailable, Weight:   Wt Readings from Last 2 Encounters:   08/24/21 78.4 kg (172 lb 14.4 oz)   08/13/21 81.6 kg (180 lb)       Physical Examination:  GENERAL: in no acute distress. Fidgety.    HEAD:  Head is normocephalic, atraumatic   EYES:  Eyes have anicteric sclerae without conjunctival injection. Pupils reactive bilaterally.   ENT:  Oropharynx is moist without exudates or ulcers. Tongue is midline. No sinus tenderness. Poor dentition. EAC and TM clear.   NECK:  Supple. No cervical lymphadenopathy  LUNGS:  Clear to auscultation bilaterally. No accessory muscle use. Not on oxygen.   CARDIOVASCULAR:  Regular rate and rhythm with no murmurs  ABDOMEN:  Normal bowel sounds, soft, non-tender, non-distended.  SKIN:  No acute rashes.  Healed scars on abdomen.   Rectal: swabs obtained for GC/CT. Small external hemorrhoid noted.  EXTREMITIES: No joint erythema or swelling.   NEUROLOGIC:  Alert. Oriented. Grossly nonfocal. Active x4 extremities          Laboratory Data:   Date  CD4  VL  4/12/15  26/2%  171K  6/29/15  141/4%  <20  10/6/15  63/2%  75K  1/10/16  25/2%  132K  5/20/16  94/4%  <20  3/7/18  256/10% 18K  4/26/18  299/11%  <20  6/15/21  540/21% <20    T Cell Subset:  Absolute CD4   Date Value Ref Range Status   06/15/2021 540 441 - 2,156 cells/uL Final "     CD4 [place] T   Date Value Ref Range Status   06/15/2021 21 (L) 28 - 63 % Final     CD8 Suppressor T   Date Value Ref Range Status   06/15/2021 36 10 - 40 % Final     CD3 Mature T   Date Value Ref Range Status   06/15/2021 59 49 - 84 % Final     CD4:CD8 Ratio   Date Value Ref Range Status   06/15/2021 0.58 (L) 1.40 - 2.60 Final     WBC   Date Value Ref Range Status   06/24/2021 9.6 4.0 - 11.0 10e9/L Final     WBC Count   Date Value Ref Range Status   08/24/2021 7.5 4.0 - 11.0 10e3/uL Final     % Lymphocytes   Date Value Ref Range Status   08/24/2021 30 % Final   06/24/2021 29.4 % Final     Absolute CD3   Date Value Ref Range Status   06/15/2021 1,477 603 - 2,990 cells/uL Final     Absolute CD8   Date Value Ref Range Status   06/15/2021 909 125 - 1,312 cells/uL Final       HIV-1 RNA Quantitative:  HIV-1 RNA Quant Result   Date Value Ref Range Status   06/15/2021 HIV-1 RNA Not Detected HIVND^HIV-1 RNA Not Detected [Copies]/mL Final     Comment:     The DAMON AmpliPrep/DAMON TaqMan HIV-1 test is an FDA-approved in vitro   nucleic acid amplification test for the quantitation of HIV-1 RNA in human   plasma (EDTA plasma) using the DAMON AmpliPrep instrument for automated viral   nucleic acid extraction and the DAMON TaqMan Analyzer or DAMON TaqMan for   automated Real Time PCR amplification and detection of the viral nucleic acid   target.  Titer results are reported in copies/ml. This assay is intended for use in   conjunction with clinical presentation and other laboratory markers of disease   prognosis and for use as an aid in assessing viral response to antiretroviral   treatment as measured by changes in plasma HIV-1 RNA levels. This test should   not be used as a donor screening test to confirm the presence of HIV-1   infection.         Metabolic Studies       Recent Labs   Lab Test 08/24/21  1622 08/23/21  0519 08/22/21  2337 06/24/21  2055 06/24/21  1931 09/25/20  0716 09/24/20  1857     --  137   < > 136   --  133   POTASSIUM 4.1  --  3.9   < > 3.2*  --  3.5   CHLORIDE 105  --  104   < > 107  --  103   CO2 26  --  24   < > 20  --  23   ANIONGAP 6  --  9   < > 9  --  7   BUN 13  --  14   < > 18  --  15   CR 0.79  --  0.76   < > 0.81  --  0.91   GFRESTIMATED >90  --  >90   < > >90  --  >90   *  --  291*   < > 220*  --  202*   A1C 10.3*  --   --    < >  --   --   --    LINDA 9.2  --  8.3*   < > 7.3*  --  9.0   PHOS  --   --   --   --  2.6  --   --    MAG  --   --   --   --  1.7  --   --    LACT  --  1.2 2.7*   < >  --    < >  --    PCAL  --   --   --   --   --   --  <0.05   CKT  --   --   --   --  118  --   --     < > = values in this interval not displayed.       Hepatic Studies    Recent Labs   Lab Test 08/24/21 1622 08/22/21 2337 08/13/21 2016 07/24/16  2355 07/21/16  0521 01/13/16  0927   BILITOTAL 0.4 0.6 0.4   < > 0.4  --    DBIL  --   --   --   --  <0.1  --    ALKPHOS 164* 118 196*   < > 99  --    PROTTOTAL 7.2 6.6* 8.0   < > 7.2  --    ALBUMIN 3.4 3.0* 3.7   < > 3.7  --    AST 12 25 16   < > 16  --    ALT 27 25 33   < > 16  --    LDH  --   --   --   --   --  217    < > = values in this interval not displayed.       Hematology Studies      Recent Labs   Lab Test 08/24/21 1622 08/22/21 2337 08/13/21 2016 07/17/21  0746 07/16/21  0733 07/15/21  0925 06/24/21  1931 06/15/21  1153 06/15/21  1153   WBC 7.5 6.2 7.9 6.3 6.5 5.6 9.6   < > 7.4   ANEU  --   --   --   --   --   --  5.6  --  4.1   ALYM  --   --   --   --   --   --  2.8  --  2.5   BRITT  --   --   --   --   --   --  0.9  --  0.5   AEOS  --   --   --   --   --   --  0.1  --  0.2   HGB 13.4 12.7* 14.4 12.9* 12.7* 12.5* 13.5   < > 14.3   HCT 39.1* 38.0* 42.0 37.5* 37.6* 37.6* 39.5*   < > 41.7    337 392 326 314 293 386   < > 355    < > = values in this interval not displayed.     Iron Testing    Recent Labs   Lab Test 08/24/21  1622 09/27/20  0614 09/26/20  0643   IRON  --   --  26*   FEB  --   --  324   IRONSAT  --   --  8*   CINDY  --   --  186    MCV 86   < > 93   FOLIC  --   --  24.0   B12  --   --  320    < > = values in this interval not displayed.       Thyroid Studies     Recent Labs   Lab Test 09/25/20  0716 02/11/20  1419 02/15/18  2158 05/23/17  1247 04/14/15  0711   TSH 0.87 2.66 3.57 3.90 2.78   T4  --   --   --   --  1.02       Microbiology:  Last Culture results:  Culture Micro   Date Value Ref Range Status   09/26/2020 No growth  Final   09/26/2020 No growth  Final   09/24/2020 Moderate growth  Normal sushma    Final   09/24/2020 No growth  Final   09/24/2020 No growth  Final   09/24/2020 No growth  Final   02/25/2020 (A)  Final    Moderate growth  Methicillin resistant Staphylococcus aureus (MRSA)     10/26/2019 No growth  Final   10/26/2019 No growth  Final   10/04/2018 (A)  Final    Moderate growth  Staphylococcus aureus  This isolate DOES NOT demonstrate inducible clindamycin resistance in vitro. Clindamycin   is susceptible and could be used when indicated, however, erythromycin is resistant and   should not be used.     02/05/2018 Moderate growth  Escherichia coli   (A)  Final   02/05/2018 Heavy growth  Streptococcus anginosus   (A)  Final   02/05/2018 Heavy growth  Mixed gram positive sushma    Final   02/05/2018 (A)  Final    Heavy growth  Bacteroides fragilis group  Susceptibility testing not routinely done     02/05/2018 (A)  Final    Heavy growth  Bacteroides fragilis  Susceptibility testing not routinely done     02/05/2018 (A)  Final    Plus  Heavy growth  Mixed aerobic and anaerobic sushma     02/05/2018 Canceled, Test credited  Final   02/05/2018 Incorrectly ordered by PCU/Clinic  Final   02/05/2018 Test reordered as correct code  Final   02/05/2018 Culture negative after 4 weeks  Final        Imaging:  Recent Results (from the past 48 hour(s))   CT Abdomen Pelvis w Contrast    Narrative    EXAM: CT ABDOMEN PELVIS W CONTRAST  LOCATION: [facility]  DATE/TIME: 8/23/2021 12:49  AM    INDICATION: Left lower quadrant pain.  COMPARISON: 8/14/2021.  TECHNIQUE: CT scan of the abdomen and pelvis was performed following injection of IV contrast. Multiplanar reformats were obtained. Dose reduction techniques were used.  CONTRAST: 89 mL Iso 370.    FINDINGS:   LOWER CHEST: Normal.    HEPATOBILIARY: Small stone in the gallbladder.    PANCREAS: Normal.    SPLEEN: Normal.    ADRENAL GLANDS: Normal.    KIDNEYS/BLADDER: No hydronephrosis. The urinary bladder is nearly empty and appears diffusely thick-walled.    BOWEL: The stomach is very distended with air and food debris. No evidence of outlet obstruction. Small and large bowel are nondilated. No obstruction or inflammation. There is a penile prosthesis reservoir in the anterior pelvis.    LYMPH NODES: Normal.    VASCULATURE: Unremarkable.    PELVIC ORGANS: The prostate gland is enlarged.    MUSCULOSKELETAL: Normal.      Impression    IMPRESSION:   1.  The stomach is very distended with air and food debris. No evidence of outlet obstruction. Small and large bowel are within normal limits.  2.  Prostate gland enlargement.  3.  Urinary bladder is nearly empty but appears diffusely thick-walled. No hydronephrosis.

## 2021-08-24 NOTE — PROGRESS NOTES
[facility]  General Infectious Diseases/HIV Progress Note     Patient:  Andrew Lockwood, Date of birth 1965, Medical record number [id number]  Date of Visit:  08/24/2021    Assessment and Plan:     1. HIV: diagnosed w/ HIV in 4/2015. CD4 at the time of diagnosis was 26/2% and VL 171K. No genotypic resistance at the time of diagnosis. Previous regimens have included Triumeq and Genvoya. Has been on Biktarvy since 7/2019. Tolerating medication and has good adherence. In June 2021 VL UD and CD4 540/21%. Today was our first visit so I will need to address more specific problems (listed below) at the the next.   Medications: continue Biktarvy-refills provided  Prophy:CD4 >200-none at this time  Surveillance labs today     2. 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 2 rim enhancing lesions in the cerebellum/pedicle (biopsy). Received clindamycin and pyrimethamine. Eventually transitioned to bactrim for secondary toxo prophy and PJP prophy. Tmp/smx stopped 11/2019.  Historically he has had memory issues after this diagnosis.     3. Type II DM on insulin: has insulin on him today. Will obtain labs including HA1c today. No retinopathy observed on 2018 optho exam. He is suppose to receive long acting and short acting insulin. Has not seen endocrinology since 8/24/20.     4. Chronic abdominal pain and Grade A esophagitis: history of mild small bowel obstruction 3/7/20, 11/11/20 and 7/13/21. Recurrent ED presentations.Most recent 8/23/21 CT A/P showed a distended stomach w/o outlet obstruction and prostate enlargement. Last colonoscopy 2016-normal appearing colon. Small bowel enteroscopy showed grade A reflux esophagitis. Omeprazole has not been helpful. Will change omeprazole to pantoprazole. Given the history of esophagitis and ongoing GERD symptoms he would be a candidate to repeat an EGD. We will discuss this at next visit.    5. Penile prosthesis     6. Cholelithiasis and  Diverticulosis: seen on CTs    7. LSIL: diagnosed 6/22/18. Follows with CRS but exams and follow up has not been consistent. 9/9/20 anoscopy and fulguration in the OR. 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 No invasive malignancy. Recommend follow up.     Health care maintenance:   A. Vaccination/Serology status.   -Hepatitis A: IgG+  -Hepatitis B: nonimmune  -Will need to discuss vaccinations at next visit-Pneumovax, Prevnar, TDaP, Zoster, Meningococcal and Influenza   -COVID received 2 doses of Pfeizer  B. Routine Infectious Disease screening.   -Hepatitis C: screen today  -Toxo IgG: positive 2015  -CMV IgG positive   -TB screen: 2015 negative  -STD sceen: GC/CT/RPR-screen today  -Dexa: discuss at next visit  C. Cardiovascular, renal and bone health.   -Cholesterol: obtain lipid panel  -Blood pressure:  Above goal >130/80  -Smoking status: none               -Weight: BMI 29.6  D. Cancer screening (if applicable)  -Anoscopy: see above  -Colonoscopy: 2016    Follow up: 6 months.     70 minutes spent on the date of the encounter doing chart review, review of test results, interpretation of tests, patient visit and documentation     Benita Ivory DO.   Pager [phone]  Infectious Diseases        Interval History:   Last seen virtually in 8/2020. He has been receiving refills of Biktarvy and endorses adherence. Does not miss doses.   He is currently homeless. During COVID he has not been going to shelters. He typically sleeps on benches, under bridges or with friends. During the winter he stayed with a friend. Community case workers and Simon Cadena are trying to get Andrew stable housing. Since patient was diagnosed with HIV he has struggled with housing instability.     Currently not employed. He said he was fired ~ 6 years ago when he was diagnosed with HIV-he was working as a  in a temp agency.   Does not drink alcohol or use IV drugs.  Sexually active with 20 partners in the past  year. Wears condoms. Has not had STD testing recently. Denies history of STDs. Agreeable to STI testing today.  Received 2 doses of Pfizer vaccine    Denies subjective fevers, chills, night sweats, weight loss, ear pain, nausea, vomiting, diarrhea, melena. Has poor dentition but no pain in teeth currently. Has recurrent admissions for abdominal pain. Patient notices that he has intermittent abdominal pain that is worse with spicy food. He has been on omeprazole w/o benefit x 2 years.     ID history:  Moved from [place] to [place] in 1993. Lived in [place] for 2 years.   Diagnosed with HIV 4/2015. CD4 at the time of diagnosis was 24/2% and , 654. At the time of HIV diagnosis he was also diagnosed with CNS Toxoplasmosis.He had 2 rim enhancing lesions in the cerebellum/pedicle. Underwent biopsy and diagnosed with toxo. Received clindamycin and pyrimethamine. Eventually transitioned to bactrim secondary toxo prophy and PJP prophy. Approximated time of HIV acquisition ~ 2008. 05/7/15 started Triumeq  11/2015 stopped medications  12/31/15 started Genvoya  7/19/19 switched Genvoya ro Biktarvy    Review of Systems:Remaining systems all reviewed and negative.    Medications:  Current Outpatient Medications   Medication Sig Dispense Refill     alcohol swab prep pads Use to swab area of injection/todd as directed. 100 each 3     bictegravir-emtricitabine-tenofovir (BIKTARVY) -25 MG per tablet Take 1 tablet by mouth daily 30 tablet 6     blood glucose (NO BRAND SPECIFIED) lancets standard To use to test glucose level in the blood. Use to test blood sugar  4  times daily as directed. To accompany glucose monitor brands per insurance coverage. 100 each 0     blood glucose (NO BRAND SPECIFIED) lancets standard Use to test blood sugar four times daily or as directed. 100 each 0     blood glucose (NO BRAND SPECIFIED) test strip To use to test glucose level in the blood. Use to test blood sugar 4 times daily as  directed. To accompany glucose monitor brands per insurance coverage. 200 strip 0     blood glucose (NO BRAND SPECIFIED) test strip 1 strip by In Vitro route 4 times daily (before meals and nightly) Use to test blood sugars 4 times daily or as directed 100 strip 11     blood glucose (NO BRAND SPECIFIED) test strip Use to test blood sugar 4 times daily or as directed. To accompany:whatever is covered 360 strip 3     blood glucose (NO BRAND SPECIFIED) test strip Use to test blood sugar 4-6 times daily or as directed. To accompany: Blood Glucose Monitor Brands: per insurance. 100 strip 6     blood glucose calibration (NO BRAND SPECIFIED) solution Used to calibrate the blood glucose monitor as needed and as directed.  To accompany  blood glucose brands per insurance coverage 1 each 0     blood glucose calibration (NO BRAND SPECIFIED) solution To accompany: whatever is covered 1 each 0     blood glucose calibration (NO BRAND SPECIFIED) solution To accompany: Blood Glucose Monitor Brands: per insurance. 1 each 0     blood glucose monitoring (NO BRAND SPECIFIED) meter device kit Use as directed. Per insurance coverage. 1 kit 0     blood glucose monitoring (NO BRAND SPECIFIED) meter device kit Use to test blood sugar 4 times daily or as directed. 1 kit 0     blood glucose monitoring (NO BRAND SPECIFIED) meter device kit Use to test blood sugar 4 times daily or as directed. Preferred blood glucose meter OR supplies to accompany: whatever is covered 1 kit 0     blood glucose monitoring (NO BRAND SPECIFIED) meter device kit Use to test blood sugar 4-6 times daily or as directed. Preferred blood glucose meter OR supplies to accompany: Blood Glucose Monitor Brands: per insurance. 1 kit 0     famotidine (PEPCID) 20 MG tablet Take 1 tablet (20 mg) by mouth 2 times daily 30 tablet 11     glucose 40 % (400 mg/mL) gel 15 g every 15 minutes by mouth as needed for low blood sugar. Oral gel is preferable for conscious and able to  "swallow patient. 112.5 g 0     insulin detemir (LEVEMIR PEN) 100 UNIT/ML pen Inject 35 Units Subcutaneous 2 times daily 9 mL 0     insulin pen needle (32G X 4 MM) 32G X 4 MM miscellaneous Use as directed by provider. Per insurance coverage 100 each 0     insulin pen needle (B-D U/F) 31G X 8 MM miscellaneous Use 1 pen needles daily or as directed. 100 each 3     insulin pen needle (BD SCOTT U/F) 32G X 4 MM miscellaneous USE 3 TO 4 NEEDLES DAILY 400 each 3     Lancets (ONETOUCH DELICA PLUS CVLTDC50U) MISC USE TO TEST BLOOD SUGAR FOUR TIMES DAILY OR AS DIRECTED 100 each 11     NOVOLOG FLEXPEN 100 UNIT/ML soln Inject 20 units with each meal, along with sliding scale 1 unit per 25mg/dL over 140 before meals and over 200 at bedtime. Max daily dose 90 units 75 mL 0     pantoprazole (PROTONIX) 40 MG EC tablet Take 1 tablet (40 mg) by mouth daily 30 tablet 6     SENNA-docusate sodium (SENNA S) 8.6-50 MG tablet Take 1 tablet by mouth 2 times daily Hold for loose stools. 60 tablet 5     Sharps Container MISC Use as directed to dispose of needles, lancets and other sharps. Per Insurance coverage 1 each 0     STATIN NOT PRESCRIBED (INTENTIONAL) Please choose reason not prescribed, below       thin (NO BRAND SPECIFIED) lancets Use with lanceting device. To accompany: Test 4 times daily with whatever is covered 360 each 3     thin (NO BRAND SPECIFIED) lancets Use with lanceting device. To accompany: Blood Glucose Monitor Brands: per insurance. 100 each 6       Allergies   Allergen Reactions     Metformin      Abdominal pain     Milk [Lac Bovis] Hives     Tylenol [Acetaminophen] Itching     Dulaglutide Rash     Penicillin V Other (See Comments) and Rash     Diffuse maculopapular rash + feels \"high\", per pt.        Immunizations:  Immunization History   Administered Date(s) Administered     HepB 03/10/2016, 06/16/2016     HepB-Adult 08/31/2017     Influenza (IIV3) PF 10/17/2016     Influenza Quad, Recombinant, p-free (RIV4) " 01/17/2020, 09/28/2020     Influenza Vaccine IM > 6 months Valent IIV4 12/22/2015, 12/14/2017     Mantoux Tuberculin Skin Test 04/12/2015     Meningococcal (Menactra ) 08/31/2015, 03/10/2016     Pneumo Conj 13-V (2010&after) 03/10/2016     Pneumococcal 23 valent 06/16/2016     TDAP Vaccine (Boostrix) 10/17/2016       Exam:  B/P: 141/83, T: 98, P: 110, R: Data Unavailable, Weight:   Wt Readings from Last 2 Encounters:   08/24/21 78.4 kg (172 lb 14.4 oz)   08/13/21 81.6 kg (180 lb)       Physical Examination:  GENERAL: in no acute distress. Fidgety.    HEAD:  Head is normocephalic, atraumatic   EYES:  Eyes have anicteric sclerae without conjunctival injection. Pupils reactive bilaterally.   ENT:  Oropharynx is moist without exudates or ulcers. Tongue is midline. No sinus tenderness. Poor dentition. EAC and TM clear.   NECK:  Supple. No cervical lymphadenopathy  LUNGS:  Clear to auscultation bilaterally. No accessory muscle use. Not on oxygen.   CARDIOVASCULAR:  Regular rate and rhythm with no murmurs  ABDOMEN:  Normal bowel sounds, soft, non-tender, non-distended.  SKIN:  No acute rashes.  Healed scars on abdomen.   Rectal: swabs obtained for GC/CT. Small external hemorrhoid noted.  EXTREMITIES: No joint erythema or swelling.   NEUROLOGIC:  Alert. Oriented. Grossly nonfocal. Active x4 extremities          Laboratory Data:   Date  CD4  VL  4/12/15  26/2%  171K  6/29/15  141/4%  <20  10/6/15  63/2%  75K  1/10/16  25/2%  132K  5/20/16  94/4%  <20  3/7/18  256/10% 18K  4/26/18  299/11%  <20  6/15/21  540/21% <20    T Cell Subset:  Absolute CD4   Date Value Ref Range Status   06/15/2021 540 441 - 2,156 cells/uL Final     CD4 [place] T   Date Value Ref Range Status   06/15/2021 21 (L) 28 - 63 % Final     CD8 Suppressor T   Date Value Ref Range Status   06/15/2021 36 10 - 40 % Final     CD3 Mature T   Date Value Ref Range Status   06/15/2021 59 49 - 84 % Final     CD4:CD8 Ratio   Date Value Ref Range Status   06/15/2021 0.58  (L) 1.40 - 2.60 Final     WBC   Date Value Ref Range Status   06/24/2021 9.6 4.0 - 11.0 10e9/L Final     WBC Count   Date Value Ref Range Status   08/24/2021 7.5 4.0 - 11.0 10e3/uL Final     % Lymphocytes   Date Value Ref Range Status   08/24/2021 30 % Final   06/24/2021 29.4 % Final     Absolute CD3   Date Value Ref Range Status   06/15/2021 1,477 603 - 2,990 cells/uL Final     Absolute CD8   Date Value Ref Range Status   06/15/2021 909 125 - 1,312 cells/uL Final       HIV-1 RNA Quantitative:  HIV-1 RNA Quant Result   Date Value Ref Range Status   06/15/2021 HIV-1 RNA Not Detected HIVND^HIV-1 RNA Not Detected [Copies]/mL Final     Comment:     The DAMON AmpliPrep/DAMON TaqMan HIV-1 test is an FDA-approved in vitro   nucleic acid amplification test for the quantitation of HIV-1 RNA in human   plasma (EDTA plasma) using the DAMON AmpliPrep instrument for automated viral   nucleic acid extraction and the DAMON TaqMan Analyzer or TwoFish TaqMan for   automated Real Time PCR amplification and detection of the viral nucleic acid   target.  Titer results are reported in copies/ml. This assay is intended for use in   conjunction with clinical presentation and other laboratory markers of disease   prognosis and for use as an aid in assessing viral response to antiretroviral   treatment as measured by changes in plasma HIV-1 RNA levels. This test should   not be used as a donor screening test to confirm the presence of HIV-1   infection.         Metabolic Studies       Recent Labs   Lab Test 08/24/21  1622 08/23/21  0519 08/22/21  2337 06/24/21  2055 06/24/21  1931 09/25/20  0716 09/24/20  1857     --  137   < > 136  --  133   POTASSIUM 4.1  --  3.9   < > 3.2*  --  3.5   CHLORIDE 105  --  104   < > 107  --  103   CO2 26  --  24   < > 20  --  23   ANIONGAP 6  --  9   < > 9  --  7   BUN 13  --  14   < > 18  --  15   CR 0.79  --  0.76   < > 0.81  --  0.91   GFRESTIMATED >90  --  >90   < > >90  --  >90   *  --  291*    < > 220*  --  202*   A1C 10.3*  --   --    < >  --   --   --    LINDA 9.2  --  8.3*   < > 7.3*  --  9.0   PHOS  --   --   --   --  2.6  --   --    MAG  --   --   --   --  1.7  --   --    LACT  --  1.2 2.7*   < >  --    < >  --    PCAL  --   --   --   --   --   --  <0.05   CKT  --   --   --   --  118  --   --     < > = values in this interval not displayed.       Hepatic Studies    Recent Labs   Lab Test 08/24/21 1622 08/22/21 2337 08/13/21 2016 07/24/16  2355 07/21/16  0521 01/13/16  0927   BILITOTAL 0.4 0.6 0.4   < > 0.4  --    DBIL  --   --   --   --  <0.1  --    ALKPHOS 164* 118 196*   < > 99  --    PROTTOTAL 7.2 6.6* 8.0   < > 7.2  --    ALBUMIN 3.4 3.0* 3.7   < > 3.7  --    AST 12 25 16   < > 16  --    ALT 27 25 33   < > 16  --    LDH  --   --   --   --   --  217    < > = values in this interval not displayed.       Hematology Studies      Recent Labs   Lab Test 08/24/21 1622 08/22/21 2337 08/13/21 2016 07/17/21  0746 07/16/21  0733 07/15/21  0925 06/24/21  1931 06/15/21  1153 06/15/21  1153   WBC 7.5 6.2 7.9 6.3 6.5 5.6 9.6   < > 7.4   ANEU  --   --   --   --   --   --  5.6  --  4.1   ALYM  --   --   --   --   --   --  2.8  --  2.5   BRITT  --   --   --   --   --   --  0.9  --  0.5   AEOS  --   --   --   --   --   --  0.1  --  0.2   HGB 13.4 12.7* 14.4 12.9* 12.7* 12.5* 13.5   < > 14.3   HCT 39.1* 38.0* 42.0 37.5* 37.6* 37.6* 39.5*   < > 41.7    337 392 326 314 293 386   < > 355    < > = values in this interval not displayed.     Iron Testing    Recent Labs   Lab Test 08/24/21  1622 09/27/20  0614 09/26/20  0643   IRON  --   --  26*   FEB  --   --  324   IRONSAT  --   --  8*   CINDY  --   --  186   MCV 86   < > 93   FOLIC  --   --  24.0   B12  --   --  320    < > = values in this interval not displayed.       Thyroid Studies     Recent Labs   Lab Test 09/25/20  0716 02/11/20  1419 02/15/18  2158 05/23/17  1247 04/14/15  0711   TSH 0.87 2.66 3.57 3.90 2.78   T4  --   --   --   --  1.02        Microbiology:  Last Culture results:  Culture Micro   Date Value Ref Range Status   09/26/2020 No growth  Final   09/26/2020 No growth  Final   09/24/2020 Moderate growth  Normal sushma    Final   09/24/2020 No growth  Final   09/24/2020 No growth  Final   09/24/2020 No growth  Final   02/25/2020 (A)  Final    Moderate growth  Methicillin resistant Staphylococcus aureus (MRSA)     10/26/2019 No growth  Final   10/26/2019 No growth  Final   10/04/2018 (A)  Final    Moderate growth  Staphylococcus aureus  This isolate DOES NOT demonstrate inducible clindamycin resistance in vitro. Clindamycin   is susceptible and could be used when indicated, however, erythromycin is resistant and   should not be used.     02/05/2018 Moderate growth  Escherichia coli   (A)  Final   02/05/2018 Heavy growth  Streptococcus anginosus   (A)  Final   02/05/2018 Heavy growth  Mixed gram positive sushma    Final   02/05/2018 (A)  Final    Heavy growth  Bacteroides fragilis group  Susceptibility testing not routinely done     02/05/2018 (A)  Final    Heavy growth  Bacteroides fragilis  Susceptibility testing not routinely done     02/05/2018 (A)  Final    Plus  Heavy growth  Mixed aerobic and anaerobic sushma     02/05/2018 Canceled, Test credited  Final   02/05/2018 Incorrectly ordered by PCU/Clinic  Final   02/05/2018 Test reordered as correct code  Final   02/05/2018 Culture negative after 4 weeks  Final        Imaging:  Recent Results (from the past 48 hour(s))   CT Abdomen Pelvis w Contrast    Narrative    EXAM: CT ABDOMEN PELVIS W CONTRAST  LOCATION: [facility]  DATE/TIME: 8/23/2021 12:49 AM    INDICATION: Left lower quadrant pain.  COMPARISON: 8/14/2021.  TECHNIQUE: CT scan of the abdomen and pelvis was performed following injection of IV contrast. Multiplanar reformats were obtained. Dose reduction techniques were used.  CONTRAST: 89 mL Iso 370.    FINDINGS:   LOWER CHEST:  Normal.    HEPATOBILIARY: Small stone in the gallbladder.    PANCREAS: Normal.    SPLEEN: Normal.    ADRENAL GLANDS: Normal.    KIDNEYS/BLADDER: No hydronephrosis. The urinary bladder is nearly empty and appears diffusely thick-walled.    BOWEL: The stomach is very distended with air and food debris. No evidence of outlet obstruction. Small and large bowel are nondilated. No obstruction or inflammation. There is a penile prosthesis reservoir in the anterior pelvis.    LYMPH NODES: Normal.    VASCULATURE: Unremarkable.    PELVIC ORGANS: The prostate gland is enlarged.    MUSCULOSKELETAL: Normal.      Impression    IMPRESSION:   1.  The stomach is very distended with air and food debris. No evidence of outlet obstruction. Small and large bowel are within normal limits.  2.  Prostate gland enlargement.  3.  Urinary bladder is nearly empty but appears diffusely thick-walled. No hydronephrosis.

## 2021-08-24 NOTE — NURSING NOTE
"Chief Complaint   Patient presents with     RECHECK     B20     BP (!) 141/83   Pulse 110   Temp 98  F (36.7  C) (Oral)   Ht 1.626 m (5' 4\")   Wt 78.4 kg (172 lb 14.4 oz)   SpO2 97%   BMI 29.68 kg/m       Wendi Loaiza MA  "

## 2021-08-25 LAB
C TRACH DNA SPEC QL NAA+PROBE: NEGATIVE
CD3 CELLS # BLD: 1447 CELLS/UL (ref 603–2990)
CD3 CELLS NFR BLD: 63 % (ref 49–84)
CD3+CD4+ CELLS # BLD: 519 CELLS/UL (ref 441–2156)
CD3+CD4+ CELLS NFR BLD: 22 % (ref 28–63)
CD3+CD4+ CELLS/CD3+CD8+ CLL BLD: 0.58 % (ref 1.4–2.6)
CD3+CD8+ CELLS # BLD: 888 CELLS/UL (ref 125–1312)
CD3+CD8+ CELLS NFR BLD: 39 % (ref 10–40)
CHOLEST SERPL-MCNC: 171 MG/DL
HCV AB SERPL QL IA: NONREACTIVE
HDLC SERPL-MCNC: 36 MG/DL
LDLC SERPL CALC-MCNC: ABNORMAL MG/DL
N GONORRHOEA DNA SPEC QL NAA+PROBE: NEGATIVE
NONHDLC SERPL-MCNC: 135 MG/DL
T CELL COMMENT: ABNORMAL
T PALLIDUM AB SER QL: NONREACTIVE
TRIGL SERPL-MCNC: 405 MG/DL

## 2021-08-25 NOTE — PATIENT INSTRUCTIONS
Continue Biktarvy-refills provided  Stop omeprazole and start pantoprazole 40 mg daily  Labs today  Follow up in 6 months  Please follow up with colorectal surgery for ongoing surveillance  At the next visit we can discuss other health maintenance items including vaccination.

## 2021-08-26 NOTE — RESULT ENCOUNTER NOTE
Hemoglobin A1 C elevated. Notified endocrinology team and will attempt to get patient scheduled with them.

## 2021-08-27 LAB
HIV1 RNA # PLAS NAA DL=20: <20 COPIES/ML
HIV1 RNA SERPL NAA+PROBE-LOG#: <1.3 {LOG_COPIES}/ML

## 2021-09-01 ENCOUNTER — TELEPHONE (OUTPATIENT)
Dept: ENDOCRINOLOGY | Facility: CLINIC | Age: 56
End: 2021-09-01

## 2021-09-01 NOTE — TELEPHONE ENCOUNTER
[facility] Call Center    Phone Message    May a detailed message be left on voicemail: yes     Reason for Call: Other: PT A1C level is still high, per Lizbeth.  Please review labs.  If you want to try to coordinate sooner in person or to communicate with pt, call Simon Cadena @ [phone]      Scheduled next available in person in Jan 2022    Action Taken: Message routed to:  [facility] ([facility]): ID    Travel Screening: Not Applicable

## 2021-09-02 ENCOUNTER — TELEPHONE (OUTPATIENT)
Dept: ENDOCRINOLOGY | Facility: CLINIC | Age: 56
End: 2021-09-02

## 2021-09-02 NOTE — TELEPHONE ENCOUNTER
I spoke with Simon in ID . He will contact Andrew for appointment with Lenora Haley  10/19/21 at 2:30 PM FTF

## 2021-09-02 NOTE — TELEPHONE ENCOUNTER
Action Needed --  I've placed a hold that needs scheduling. Please see below.  Department: Endocrine   Provider:Lenora Haley   Date/Time: 10/19/21 2:30 PM FTF   RFV: Diabetes   Aleah Hampton RN on 9/2/2021 at 1:45 PM

## 2021-09-07 ENCOUNTER — APPOINTMENT (OUTPATIENT)
Dept: GENERAL RADIOLOGY | Facility: CLINIC | Age: 56
End: 2021-09-07
Attending: EMERGENCY MEDICINE
Payer: COMMERCIAL

## 2021-09-07 ENCOUNTER — HOSPITAL ENCOUNTER (OUTPATIENT)
Facility: CLINIC | Age: 56
Setting detail: OBSERVATION
Discharge: NURSING, REHAB OR LONG-TERM CARE | End: 2021-09-10
Attending: EMERGENCY MEDICINE | Admitting: EMERGENCY MEDICINE
Payer: COMMERCIAL

## 2021-09-07 DIAGNOSIS — S52.501A CLOSED FRACTURE OF DISTAL ENDS OF RIGHT RADIUS AND ULNA, INITIAL ENCOUNTER: ICD-10-CM

## 2021-09-07 DIAGNOSIS — Z59.00 HOMELESS: ICD-10-CM

## 2021-09-07 DIAGNOSIS — Z79.4 TYPE 2 DIABETES MELLITUS WITH HYPERGLYCEMIA, WITH LONG-TERM CURRENT USE OF INSULIN (H): ICD-10-CM

## 2021-09-07 DIAGNOSIS — S52.601D CLOSED FRACTURE OF DISTAL ENDS OF RIGHT RADIUS AND ULNA WITH ROUTINE HEALING, SUBSEQUENT ENCOUNTER: Primary | ICD-10-CM

## 2021-09-07 DIAGNOSIS — R73.9 HYPERGLYCEMIA: ICD-10-CM

## 2021-09-07 DIAGNOSIS — S52.601A CLOSED FRACTURE OF DISTAL ENDS OF RIGHT RADIUS AND ULNA, INITIAL ENCOUNTER: ICD-10-CM

## 2021-09-07 DIAGNOSIS — S52.501D CLOSED FRACTURE OF DISTAL ENDS OF RIGHT RADIUS AND ULNA WITH ROUTINE HEALING, SUBSEQUENT ENCOUNTER: Primary | ICD-10-CM

## 2021-09-07 DIAGNOSIS — Z11.52 ENCOUNTER FOR SCREENING LABORATORY TESTING FOR SEVERE ACUTE RESPIRATORY SYNDROME CORONAVIRUS 2 (SARS-COV-2): ICD-10-CM

## 2021-09-07 DIAGNOSIS — E11.65 TYPE 2 DIABETES MELLITUS WITH HYPERGLYCEMIA, WITH LONG-TERM CURRENT USE OF INSULIN (H): ICD-10-CM

## 2021-09-07 LAB
ALBUMIN SERPL-MCNC: 3.4 G/DL (ref 3.4–5)
ALP SERPL-CCNC: 169 U/L (ref 40–150)
ALT SERPL W P-5'-P-CCNC: 23 U/L (ref 0–70)
ANION GAP SERPL CALCULATED.3IONS-SCNC: 6 MMOL/L (ref 3–14)
AST SERPL W P-5'-P-CCNC: 17 U/L (ref 0–45)
BASOPHILS # BLD AUTO: 0.1 10E3/UL (ref 0–0.2)
BASOPHILS NFR BLD AUTO: 1 %
BILIRUB SERPL-MCNC: 0.4 MG/DL (ref 0.2–1.3)
BUN SERPL-MCNC: 17 MG/DL (ref 7–30)
CALCIUM SERPL-MCNC: 9.1 MG/DL (ref 8.5–10.1)
CHLORIDE BLD-SCNC: 105 MMOL/L (ref 94–109)
CO2 SERPL-SCNC: 23 MMOL/L (ref 20–32)
CREAT SERPL-MCNC: 0.84 MG/DL (ref 0.66–1.25)
EOSINOPHIL # BLD AUTO: 0.1 10E3/UL (ref 0–0.7)
EOSINOPHIL NFR BLD AUTO: 2 %
ERYTHROCYTE [DISTWIDTH] IN BLOOD BY AUTOMATED COUNT: 12.9 % (ref 10–15)
GFR SERPL CREATININE-BSD FRML MDRD: >90 ML/MIN/1.73M2
GLUCOSE BLD-MCNC: 427 MG/DL (ref 70–99)
HCT VFR BLD AUTO: 40.3 % (ref 40–53)
HGB BLD-MCNC: 13.7 G/DL (ref 13.3–17.7)
IMM GRANULOCYTES # BLD: 0.1 10E3/UL
IMM GRANULOCYTES NFR BLD: 1 %
LYMPHOCYTES # BLD AUTO: 2 10E3/UL (ref 0.8–5.3)
LYMPHOCYTES NFR BLD AUTO: 32 %
MCH RBC QN AUTO: 29.6 PG (ref 26.5–33)
MCHC RBC AUTO-ENTMCNC: 34 G/DL (ref 31.5–36.5)
MCV RBC AUTO: 87 FL (ref 78–100)
MONOCYTES # BLD AUTO: 0.5 10E3/UL (ref 0–1.3)
MONOCYTES NFR BLD AUTO: 7 %
NEUTROPHILS # BLD AUTO: 3.6 10E3/UL (ref 1.6–8.3)
NEUTROPHILS NFR BLD AUTO: 57 %
NRBC # BLD AUTO: 0 10E3/UL
NRBC BLD AUTO-RTO: 0 /100
PLATELET # BLD AUTO: 379 10E3/UL (ref 150–450)
POTASSIUM BLD-SCNC: 4 MMOL/L (ref 3.4–5.3)
PROT SERPL-MCNC: 7.4 G/DL (ref 6.8–8.8)
RBC # BLD AUTO: 4.63 10E6/UL (ref 4.4–5.9)
SARS-COV-2 RNA RESP QL NAA+PROBE: NEGATIVE
SODIUM SERPL-SCNC: 134 MMOL/L (ref 133–144)
WBC # BLD AUTO: 6.3 10E3/UL (ref 4–11)

## 2021-09-07 PROCEDURE — 250N000009 HC RX 250: Performed by: EMERGENCY MEDICINE

## 2021-09-07 PROCEDURE — 258N000003 HC RX IP 258 OP 636: Performed by: EMERGENCY MEDICINE

## 2021-09-07 PROCEDURE — G0378 HOSPITAL OBSERVATION PER HR: HCPCS

## 2021-09-07 PROCEDURE — 85025 COMPLETE CBC W/AUTO DIFF WBC: CPT | Performed by: EMERGENCY MEDICINE

## 2021-09-07 PROCEDURE — 96376 TX/PRO/DX INJ SAME DRUG ADON: CPT

## 2021-09-07 PROCEDURE — 250N000011 HC RX IP 250 OP 636: Performed by: EMERGENCY MEDICINE

## 2021-09-07 PROCEDURE — 96361 HYDRATE IV INFUSION ADD-ON: CPT | Mod: 59

## 2021-09-07 PROCEDURE — 96375 TX/PRO/DX INJ NEW DRUG ADDON: CPT | Mod: 59

## 2021-09-07 PROCEDURE — 250N000013 HC RX MED GY IP 250 OP 250 PS 637: Performed by: EMERGENCY MEDICINE

## 2021-09-07 PROCEDURE — 25605 CLTX DST RDL FX/EPHYS SEP W/: CPT | Mod: RT

## 2021-09-07 PROCEDURE — U0003 INFECTIOUS AGENT DETECTION BY NUCLEIC ACID (DNA OR RNA); SEVERE ACUTE RESPIRATORY SYNDROME CORONAVIRUS 2 (SARS-COV-2) (CORONAVIRUS DISEASE [COVID-19]), AMPLIFIED PROBE TECHNIQUE, MAKING USE OF HIGH THROUGHPUT TECHNOLOGIES AS DESCRIBED BY CMS-2020-01-R: HCPCS | Performed by: EMERGENCY MEDICINE

## 2021-09-07 PROCEDURE — 96374 THER/PROPH/DIAG INJ IV PUSH: CPT

## 2021-09-07 PROCEDURE — 999N000065 XR WRIST RIGHT G/E 3 VIEWS: Mod: RT

## 2021-09-07 PROCEDURE — 99285 EMERGENCY DEPT VISIT HI MDM: CPT | Mod: 25 | Performed by: EMERGENCY MEDICINE

## 2021-09-07 PROCEDURE — 82040 ASSAY OF SERUM ALBUMIN: CPT | Performed by: EMERGENCY MEDICINE

## 2021-09-07 PROCEDURE — 73110 X-RAY EXAM OF WRIST: CPT | Mod: 26 | Performed by: RADIOLOGY

## 2021-09-07 PROCEDURE — C9803 HOPD COVID-19 SPEC COLLECT: HCPCS

## 2021-09-07 PROCEDURE — 36415 COLL VENOUS BLD VENIPUNCTURE: CPT | Performed by: EMERGENCY MEDICINE

## 2021-09-07 PROCEDURE — 25605 CLTX DST RDL FX/EPHYS SEP W/: CPT | Mod: 54 | Performed by: EMERGENCY MEDICINE

## 2021-09-07 PROCEDURE — 99285 EMERGENCY DEPT VISIT HI MDM: CPT | Mod: 25

## 2021-09-07 PROCEDURE — 73110 X-RAY EXAM OF WRIST: CPT | Mod: RT

## 2021-09-07 PROCEDURE — 73090 X-RAY EXAM OF FOREARM: CPT | Mod: 26 | Performed by: RADIOLOGY

## 2021-09-07 PROCEDURE — 999N000065 XR FOREARM RIGHT 2 VIEWS

## 2021-09-07 RX ORDER — SODIUM CHLORIDE 9 MG/ML
INJECTION, SOLUTION INTRAVENOUS CONTINUOUS
Status: DISCONTINUED | OUTPATIENT
Start: 2021-09-07 | End: 2021-09-10 | Stop reason: HOSPADM

## 2021-09-07 RX ORDER — OXYCODONE HYDROCHLORIDE 5 MG/1
5 TABLET ORAL ONCE
Status: DISCONTINUED | OUTPATIENT
Start: 2021-09-07 | End: 2021-09-10 | Stop reason: HOSPADM

## 2021-09-07 RX ORDER — BUPIVACAINE HYDROCHLORIDE 5 MG/ML
30 INJECTION, SOLUTION EPIDURAL; INTRACAUDAL ONCE
Status: COMPLETED | OUTPATIENT
Start: 2021-09-07 | End: 2021-09-07

## 2021-09-07 RX ORDER — BUPIVACAINE HYDROCHLORIDE 5 MG/ML
INJECTION, SOLUTION EPIDURAL; INTRACAUDAL
Status: DISCONTINUED
Start: 2021-09-07 | End: 2021-09-08 | Stop reason: HOSPADM

## 2021-09-07 RX ORDER — HYDROMORPHONE HYDROCHLORIDE 1 MG/ML
0.5 INJECTION, SOLUTION INTRAMUSCULAR; INTRAVENOUS; SUBCUTANEOUS ONCE
Status: COMPLETED | OUTPATIENT
Start: 2021-09-07 | End: 2021-09-07

## 2021-09-07 RX ADMIN — BUPIVACAINE HYDROCHLORIDE 150 MG: 5 INJECTION, SOLUTION EPIDURAL; INTRACAUDAL; PERINEURAL at 18:42

## 2021-09-07 RX ADMIN — HYDROMORPHONE HYDROCHLORIDE 0.5 MG: 1 INJECTION, SOLUTION INTRAMUSCULAR; INTRAVENOUS; SUBCUTANEOUS at 21:17

## 2021-09-07 RX ADMIN — Medication 15 MG: at 18:41

## 2021-09-07 RX ADMIN — SODIUM CHLORIDE 1000 ML: 9 INJECTION, SOLUTION INTRAVENOUS at 17:01

## 2021-09-07 RX ADMIN — HYDROMORPHONE HYDROCHLORIDE 0.5 MG: 1 INJECTION, SOLUTION INTRAMUSCULAR; INTRAVENOUS; SUBCUTANEOUS at 17:01

## 2021-09-07 RX ADMIN — LIDOCAINE HYDROCHLORIDE 30 ML: 20 SOLUTION ORAL; TOPICAL at 19:51

## 2021-09-07 SDOH — ECONOMIC STABILITY - HOUSING INSECURITY: HOMELESSNESS UNSPECIFIED: Z59.00

## 2021-09-07 NOTE — ED PROVIDER NOTES
ED Provider Note  [facility]      History     Chief Complaint   Patient presents with     Fall     The history is provided by the patient and medical records.     Andrew Lockwood is a 53 year old male with a PMH notable for HIV/AIDS, diabetes type 2, GERD, seizures, substance abuse, and hyperlipidemia who presents to the ED via EMS for an evaluation of a fall. Patient reports that he was crossing the train tracks and tripped and landed on his right wrist. He states that the pain is localized in his right wrist. Denies any arm or shoulder pain. He states that he has titanium in his right arm from a previous injury. Patient reports allergies to penicillin and metformin. Patient notes that he is homeless but is currently staying with a friend.    Past Medical History  Past Medical History:   Diagnosis Date     AIDS (H)      Allergic rhinitis due to other allergen     DNS     Anal dysplasia      Chronic abdominal pain      CNS toxoplasmosis (H)      Diabetes type 2, controlled (H)      GERD (gastroesophageal reflux disease)      History of seizure      History of substance abuse (H)      HIV (human immunodeficiency virus infection) (H)      HLD (hyperlipidemia)      Lung nodules      Periungual wart      PTSD (post-traumatic stress disorder)      Sleep apnea     doesn't use CPAP     Past Surgical History:   Procedure Laterality Date     ANOSCOPY N/A 9/9/2020    Procedure: Exam Under Anesthesia, ANOSCOPY, fulgeration of rectal fissures with Rectal Biopsies;  Surgeon: Thanh Lundberg MD;  Location: [facility] OR     COLONOSCOPY Left 1/22/2016    Procedure: COMBINED COLONOSCOPY, SINGLE OR MULTIPLE BIOPSY/POLYPECTOMY BY BIOPSY;  Surgeon: Clark Saini MD;  Location:  GI      EXPLORE UNDESC TESTIS,INGUIN/SCROTAL       LAPAROSCOPIC APPENDECTOMY N/A 1/31/2018    Procedure: LAPAROSCOPIC APPENDECTOMY;  LAPAROSCOPIC APPENDECTOMY;  Surgeon: Dawn Holt MD;  Location:  OR      LAPAROSCOPY DIAGNOSTIC (GENERAL) N/A 7/26/2016    Procedure: LAPAROSCOPY DIAGNOSTIC (GENERAL);  Surgeon: Susannah Arriaga MD;  Location: [facility] OR     LAPAROSCOPY DIAGNOSTIC (GENERAL) N/A 4/16/2018    Procedure: LAPAROSCOPY DIAGNOSTIC (GENERAL);  Diagnostic laparoscopy and lysis of adhesions;  Surgeon: Prince Dowling MD;  Location: [facility] OR     OPTICAL TRACKING SYSTEM CRANIOTOMY, EXCISE TUMOR, COMBINED Left 4/10/2015    Procedure: COMBINED OPTICAL TRACKING SYSTEM CRANIOTOMY, EXCISE TUMOR;  Surgeon: Mirlande Colmenares MD;  Location: [facility] OR     REPAIR GAMEKEEPER'S THUMB Right 12/2/2016    Procedure: REPAIR LIGAMENT ULNAR COLLATERAL THUMB (GAMEKEEPER'S);  Surgeon: Evin Zamorano MD;  Location:  OR     ZZC NONSPECIFIC PROCEDURE      right forearm fracture     alcohol swab prep pads  bictegravir-emtricitabine-tenofovir (BIKTARVY) -25 MG per tablet  blood glucose (NO BRAND SPECIFIED) lancets standard  blood glucose (NO BRAND SPECIFIED) lancets standard  blood glucose (NO BRAND SPECIFIED) test strip  blood glucose (NO BRAND SPECIFIED) test strip  blood glucose (NO BRAND SPECIFIED) test strip  blood glucose (NO BRAND SPECIFIED) test strip  blood glucose calibration (NO BRAND SPECIFIED) solution  blood glucose calibration (NO BRAND SPECIFIED) solution  blood glucose calibration (NO BRAND SPECIFIED) solution  blood glucose monitoring (NO BRAND SPECIFIED) meter device kit  blood glucose monitoring (NO BRAND SPECIFIED) meter device kit  blood glucose monitoring (NO BRAND SPECIFIED) meter device kit  blood glucose monitoring (NO BRAND SPECIFIED) meter device kit  famotidine (PEPCID) 20 MG tablet  glucose 40 % (400 mg/mL) gel  insulin detemir (LEVEMIR PEN) 100 UNIT/ML pen  insulin pen needle (32G X 4 MM) 32G X 4 MM miscellaneous  insulin pen needle (B-D U/F) 31G X 8 MM miscellaneous  insulin pen needle (BD SCOTT U/F) 32G X 4 MM miscellaneous  Lancets (ONETOUCH DELICA PLUS [id number]) MISC  NOVOLOG FLEXPEN 100  "UNIT/ML soln  pantoprazole (PROTONIX) 40 MG EC tablet  SENNA-docusate sodium (SENNA S) 8.6-50 MG tablet  Sharps Container MISC  STATIN NOT PRESCRIBED (INTENTIONAL)  thin (NO BRAND SPECIFIED) lancets  thin (NO BRAND SPECIFIED) lancets      Allergies   Allergen Reactions     Metformin      Abdominal pain     Milk [Lac Bovis] Hives     Tylenol [Acetaminophen] Itching     Dulaglutide Rash     Penicillin V Other (See Comments) and Rash     Diffuse maculopapular rash + feels \"high\", per pt.      Family History  Family History   Problem Relation Age of Onset     Diabetes Brother      Diabetes Father      Alzheimer Disease Father      Unknown/Adopted Mother      Diabetes Paternal Grandfather      Cancer No family hx of         no skin cancer     Skin Cancer No family hx of         no famiy hx of skin cancer     Glaucoma No family hx of      Macular Degeneration No family hx of      Social History   Social History     Tobacco Use     Smoking status: Current Every Day Smoker     Packs/day: 0.50     Years: 40.00     Pack years: 20.00     Types: Cigarettes     Smokeless tobacco: Former User   Substance Use Topics     Alcohol use: No     Alcohol/week: 0.0 standard drinks     Comment: Last etoh in 2007     Drug use: Not Currently     Types: Marijuana, Methamphetamines     Comment: Sober 9 months       Past medical history, past surgical history, medications, allergies, family history, and social history were reviewed with the patient. No additional pertinent items.       Review of Systems  A complete review of systems was performed with pertinent positives and negatives noted in the HPI, and all other systems negative.    Physical Exam   BP: (!) 162/94  Pulse: 106  Temp: 99.2  F (37.3  C)  Resp: 16  SpO2: 97 %  Physical Exam  Vitals and nursing note reviewed.   Constitutional:       General: He is not in acute distress.     Appearance: He is well-developed. He is not ill-appearing, toxic-appearing or diaphoretic.      Comments: " Patient is awake and alert, mentating normally, appears uncomfortable but otherwise no acute distress.  He is somewhat disheveled and covered in grease.   HENT:      Head: Normocephalic and atraumatic.      Mouth/Throat:      Lips: Pink.      Mouth: Mucous membranes are moist.      Pharynx: Oropharynx is clear. No oropharyngeal exudate.   Eyes:      General: Lids are normal. No scleral icterus.     Extraocular Movements: Extraocular movements intact.      Right eye: No nystagmus.      Left eye: No nystagmus.      Conjunctiva/sclera: Conjunctivae normal.      Pupils: Pupils are equal, round, and reactive to light.   Neck:      Thyroid: No thyromegaly.      Vascular: No JVD.      Trachea: No tracheal deviation.   Cardiovascular:      Rate and Rhythm: Normal rate and regular rhythm.      Pulses: Normal pulses.      Heart sounds: Normal heart sounds. No murmur heard.   No friction rub. No gallop.    Pulmonary:      Effort: Pulmonary effort is normal. No respiratory distress.      Breath sounds: Normal breath sounds.   Abdominal:      General: Bowel sounds are normal. There is no distension.      Palpations: Abdomen is soft. There is no mass.      Tenderness: There is no abdominal tenderness. There is no guarding or rebound.   Musculoskeletal:      Right wrist: Swelling, deformity, tenderness and bony tenderness present. Decreased range of motion. Normal pulse.      Cervical back: Normal range of motion and neck supple. No erythema or rigidity.      Right lower leg: No edema.      Left lower leg: No edema.   Lymphadenopathy:      Cervical: No cervical adenopathy.   Skin:     General: Skin is warm and dry.      Capillary Refill: Capillary refill takes less than 2 seconds.      Coloration: Skin is not pale.      Findings: No erythema or rash.   Neurological:      Mental Status: He is alert and oriented to person, place, and time.      Cranial Nerves: No cranial nerve deficit.      Sensory: No sensory deficit.      Motor:  Motor function is intact.   Psychiatric:         Mood and Affect: Mood and affect normal.         Speech: Speech normal.         Behavior: Behavior normal.         ED Course     4:46 PM  The patient was seen and examined by Linda Cloud in Room ED27.   [facility]    -Fracture  Performed by: Ruel Cloud MD  Authorized by: Ruel Cloud MD       INJURY      Injury location:  Forearm    Forearm injury location:  R forearm    Forearm fracture type: distal radius and ulnar styloid      PRE PROCEDURE ASSESSMENT      Neurological function: normal      Distal perfusion: normal      Range of motion: reduced      ANESTHESIA (see MAR for exact dosages)      Anesthesia method:  Nerve block    Block location:  Hematoma block    Block needle gauge:  27 G    Block anesthetic:  Bupivacaine 0.5% w/o epi    PROCEDURE DETAILS:     Manipulation performed: yes      Skin traction used: yes      Skeletal traction used: no      Pin inserted: no      Reduction successful: yes      X-ray confirmed reduction: yes      Immobilization:  Splint    Splint type:  Sugar tong    Supplies used:  Plaster, elastic bandage and cotton padding    POST PROCEDURE ASSESSMENT      Neurological function: normal      Distal perfusion: normal      PROCEDURE   Patient Tolerance:  Patient tolerated the procedure well with no immediate complications                    Results for orders placed or performed during the hospital encounter of 09/07/21   XR Wrist Right 3 Views     Status: None    Narrative    EXAM: XR WRIST RIGHT G/E 3 VIEWS  LOCATION: [facility]  DATE/TIME: 9/7/2021 5:08 PM    INDICATION: trauma  COMPARISON: None.      Impression    IMPRESSION: Comminuted fractures of the distal radial metaphysis with impaction dorsally resulting in apex volar angulation. Dorsally the fracture approaches and may extend to the distal articular surface.  Fracture of the distal ulnar metaphysis with   apex volar angulation.   XR Wrist Right 3 Views     Status: None (Preliminary result)    Narrative    EXAM: XR WRIST RIGHT G/E 3 VIEWS  LOCATION: [facility]  DATE/TIME: 9/7/2021 7:18 PM    INDICATION: Wrist pain. Status post fracture reduction.  COMPARISON: 09/07/2021 at 1712 hours.      Impression    IMPRESSION: Comminuted mildly displaced fractures of the distal radial and ulnar metaphyses with improvement in the degree of angulation of the fracture fragments when compared with the prior study.   Radius/Ulna XR, PA & LAT, right     Status: None (Preliminary result)    Narrative    EXAM: XR FOREARM RIGHT 2 VIEWS  LOCATION: [facility]  DATE/TIME: 9/7/2021 7:18 PM    INDICATION: Wrist pain.  COMPARISON: None.      Impression    IMPRESSION: Comminuted mildly displaced fractures of the distal radial and ulnar metaphyses. No evidence of additional fractures.   CBC with platelets differential     Status: Abnormal    Narrative    The following orders were created for panel order CBC with platelets differential.  Procedure                               Abnormality         Status                     ---------                               -----------         ------                     CBC with platelets and d...[[id number]]  Abnormal            Final result                 Please view results for these tests on the individual orders.   Comprehensive metabolic panel     Status: Abnormal   Result Value Ref Range    Sodium 134 133 - 144 mmol/L    Potassium 4.0 3.4 - 5.3 mmol/L    Chloride 105 94 - 109 mmol/L    Carbon Dioxide (CO2) 23 20 - 32 mmol/L    Anion Gap 6 3 - 14 mmol/L    Urea Nitrogen 17 7 - 30 mg/dL    Creatinine 0.84 0.66 - 1.25 mg/dL    Calcium 9.1 8.5 - 10.1 mg/dL    Glucose 427 (H) 70 - 99 mg/dL    Alkaline Phosphatase 169 (H) 40 - 150 U/L    AST 17 0 - 45 U/L    ALT 23 0 - 70  U/L    Protein Total 7.4 6.8 - 8.8 g/dL    Albumin 3.4 3.4 - 5.0 g/dL    Bilirubin Total 0.4 0.2 - 1.3 mg/dL    GFR Estimate >90 >60 mL/min/1.73m2   CBC with platelets and differential     Status: Abnormal   Result Value Ref Range    WBC Count 6.3 4.0 - 11.0 10e3/uL    RBC Count 4.63 4.40 - 5.90 10e6/uL    Hemoglobin 13.7 13.3 - 17.7 g/dL    Hematocrit 40.3 40.0 - 53.0 %    MCV 87 78 - 100 fL    MCH 29.6 26.5 - 33.0 pg    MCHC 34.0 31.5 - 36.5 g/dL    RDW 12.9 10.0 - 15.0 %    Platelet Count 379 150 - 450 10e3/uL    % Neutrophils 57 %    % Lymphocytes 32 %    % Monocytes 7 %    % Eosinophils 2 %    % Basophils 1 %    % Immature Granulocytes 1 %    NRBCs per 100 WBC 0 <1 /100    Absolute Neutrophils 3.6 1.6 - 8.3 10e3/uL    Absolute Lymphocytes 2.0 0.8 - 5.3 10e3/uL    Absolute Monocytes 0.5 0.0 - 1.3 10e3/uL    Absolute Eosinophils 0.1 0.0 - 0.7 10e3/uL    Absolute Basophils 0.1 0.0 - 0.2 10e3/uL    Absolute Immature Granulocytes 0.1 (H) <=0.0 10e3/uL    Absolute NRBCs 0.0 10e3/uL     Medications   0.9% sodium chloride BOLUS (1,000 mLs Intravenous New Bag 9/7/21 1701)     Followed by   sodium chloride 0.9% infusion (has no administration in time range)   bupivacaine (PF) (MARCAINE) 0.5 % injection (has no administration in time range)   HYDROmorphone (PF) (DILAUDID) injection 0.5 mg (0.5 mg Intravenous Given 9/7/21 1701)   bupivacaine (MARCAINE) 0.5% preservative free injection (150 mg Other Given 9/7/21 1842)   ketamine (KETALAR) injection 15 mg (15 mg Intravenous Given 9/7/21 1841)   lidocaine (XYLOCAINE) 2 % 15 mL, alum & mag hydroxide-simethicone (MAALOX) 15 mL GI Cocktail (30 mLs Oral Given 9/7/21 1951)        Assessments & Plan (with Medical Decision Making)     This patient presented to the emergency department after [place] railroad tracks and suffering a distal radius and ulnar fracture.  There is a closed fracture and was neurologically intact.  I did perform reduction which resulted in more  anatomic alignment.  Blood sugar is elevated, but no signs of DKA.  Patient did receive Dilaudid as well as ketamine for pain and hematoma block was performed during reduction procedure.  Given his homelessness and now loss of function of his dominant arm I do not feel it is safe to discharge him.  I discussed the case with orthopedics who will evaluate the patient and provide recommendations as well as the KASSY in the observation unit.  Patient will be admitted to the observation unit for social work consult to provide safe disposition.    I have reviewed the nursing notes. I have reviewed the findings, diagnosis, plan and need for follow up with the patient.    New Prescriptions    No medications on file       Final diagnoses:   Closed fracture of distal ends of right radius and ulna, initial encounter   Hyperglycemia   Homeless       --  I, Sandra Montoya, am serving as a trained medical scribe to document services personally performed by Ruel Cloud MD, based on the provider's statements to me.     IRuel MD, was physically present and have reviewed and verified the accuracy of this note documented by Sandra Montoya.    Ruel Cloud MD  [facility] EMERGENCY DEPARTMENT  9/7/2021     Ruel Cloud MD  09/07/21 1958

## 2021-09-07 NOTE — ED NOTES
Bed: ED27  Expected date:   Expected time:   Means of arrival:   Comments:  [address] with a 57 yo M reports of a broken wrist.

## 2021-09-07 NOTE — ED TRIAGE NOTES
"Pt BIBA following a fall on the street while grabbing dinner, pt states he caught himself with his right arm and its \"broken\". Given 1mg of dilaudid & 1mg of versed in route with some relief. PHX of diabetes, BG per , and pt states he's been drinking lots of soda today.    "

## 2021-09-08 ENCOUNTER — TELEPHONE (OUTPATIENT)
Dept: ORTHOPEDICS | Facility: CLINIC | Age: 56
End: 2021-09-08

## 2021-09-08 LAB
GLUCOSE BLDC GLUCOMTR-MCNC: 276 MG/DL (ref 70–99)
GLUCOSE BLDC GLUCOMTR-MCNC: 277 MG/DL (ref 70–99)
GLUCOSE BLDC GLUCOMTR-MCNC: 295 MG/DL (ref 70–99)
GLUCOSE BLDC GLUCOMTR-MCNC: 306 MG/DL (ref 70–99)
GLUCOSE BLDC GLUCOMTR-MCNC: 361 MG/DL (ref 70–99)

## 2021-09-08 PROCEDURE — 96372 THER/PROPH/DIAG INJ SC/IM: CPT | Performed by: EMERGENCY MEDICINE

## 2021-09-08 PROCEDURE — 96372 THER/PROPH/DIAG INJ SC/IM: CPT

## 2021-09-08 PROCEDURE — G0378 HOSPITAL OBSERVATION PER HR: HCPCS

## 2021-09-08 PROCEDURE — 250N000009 HC RX 250: Performed by: EMERGENCY MEDICINE

## 2021-09-08 PROCEDURE — 250N000013 HC RX MED GY IP 250 OP 250 PS 637: Performed by: NURSE PRACTITIONER

## 2021-09-08 PROCEDURE — 99219 PR INITIAL OBSERVATION CARE,LEVEL II: CPT | Performed by: NURSE PRACTITIONER

## 2021-09-08 PROCEDURE — 250N000013 HC RX MED GY IP 250 OP 250 PS 637: Performed by: EMERGENCY MEDICINE

## 2021-09-08 PROCEDURE — 258N000003 HC RX IP 258 OP 636: Performed by: NURSE PRACTITIONER

## 2021-09-08 RX ORDER — SODIUM CHLORIDE 9 MG/ML
INJECTION, SOLUTION INTRAVENOUS CONTINUOUS
Status: DISCONTINUED | OUTPATIENT
Start: 2021-09-08 | End: 2021-09-10 | Stop reason: HOSPADM

## 2021-09-08 RX ORDER — TRAMADOL HYDROCHLORIDE 50 MG/1
50 TABLET ORAL EVERY 6 HOURS PRN
Status: DISCONTINUED | OUTPATIENT
Start: 2021-09-08 | End: 2021-09-08

## 2021-09-08 RX ORDER — POLYETHYLENE GLYCOL 3350 17 G/17G
17 POWDER, FOR SOLUTION ORAL DAILY
Status: DISCONTINUED | OUTPATIENT
Start: 2021-09-08 | End: 2021-09-09

## 2021-09-08 RX ORDER — NICOTINE POLACRILEX 4 MG
15-30 LOZENGE BUCCAL
Status: DISCONTINUED | OUTPATIENT
Start: 2021-09-08 | End: 2021-09-10 | Stop reason: HOSPADM

## 2021-09-08 RX ORDER — OXYCODONE HYDROCHLORIDE 5 MG/1
5 TABLET ORAL EVERY 4 HOURS PRN
Status: DISCONTINUED | OUTPATIENT
Start: 2021-09-08 | End: 2021-09-08

## 2021-09-08 RX ORDER — LIDOCAINE 40 MG/G
CREAM TOPICAL
Status: DISCONTINUED | OUTPATIENT
Start: 2021-09-08 | End: 2021-09-10 | Stop reason: HOSPADM

## 2021-09-08 RX ORDER — IBUPROFEN 200 MG
400 TABLET ORAL ONCE
Status: COMPLETED | OUTPATIENT
Start: 2021-09-08 | End: 2021-09-08

## 2021-09-08 RX ORDER — ONDANSETRON 2 MG/ML
4 INJECTION INTRAMUSCULAR; INTRAVENOUS EVERY 6 HOURS PRN
Status: DISCONTINUED | OUTPATIENT
Start: 2021-09-08 | End: 2021-09-10 | Stop reason: HOSPADM

## 2021-09-08 RX ORDER — IBUPROFEN 200 MG
400 TABLET ORAL 3 TIMES DAILY
Status: DISCONTINUED | OUTPATIENT
Start: 2021-09-08 | End: 2021-09-08

## 2021-09-08 RX ORDER — TRAMADOL HYDROCHLORIDE 50 MG/1
50 TABLET ORAL ONCE
Status: COMPLETED | OUTPATIENT
Start: 2021-09-08 | End: 2021-09-08

## 2021-09-08 RX ORDER — AMOXICILLIN 250 MG
1 CAPSULE ORAL 2 TIMES DAILY
Status: DISCONTINUED | OUTPATIENT
Start: 2021-09-08 | End: 2021-09-09

## 2021-09-08 RX ORDER — ONDANSETRON 4 MG/1
4 TABLET, ORALLY DISINTEGRATING ORAL EVERY 6 HOURS PRN
Status: DISCONTINUED | OUTPATIENT
Start: 2021-09-08 | End: 2021-09-10 | Stop reason: HOSPADM

## 2021-09-08 RX ORDER — DEXTROSE MONOHYDRATE 25 G/50ML
25-50 INJECTION, SOLUTION INTRAVENOUS
Status: DISCONTINUED | OUTPATIENT
Start: 2021-09-08 | End: 2021-09-10 | Stop reason: HOSPADM

## 2021-09-08 RX ORDER — PANTOPRAZOLE SODIUM 40 MG/1
40 TABLET, DELAYED RELEASE ORAL DAILY
Status: DISCONTINUED | OUTPATIENT
Start: 2021-09-08 | End: 2021-09-10 | Stop reason: HOSPADM

## 2021-09-08 RX ORDER — TRAMADOL HYDROCHLORIDE 50 MG/1
50-100 TABLET ORAL EVERY 6 HOURS PRN
Status: DISCONTINUED | OUTPATIENT
Start: 2021-09-08 | End: 2021-09-10 | Stop reason: HOSPADM

## 2021-09-08 RX ADMIN — PANTOPRAZOLE SODIUM 40 MG: 40 TABLET, DELAYED RELEASE ORAL at 07:54

## 2021-09-08 RX ADMIN — IBUPROFEN 400 MG: 200 TABLET, FILM COATED ORAL at 08:30

## 2021-09-08 RX ADMIN — TRAMADOL HYDROCHLORIDE 100 MG: 50 TABLET, FILM COATED ORAL at 16:24

## 2021-09-08 RX ADMIN — TRAMADOL HYDROCHLORIDE 50 MG: 50 TABLET, FILM COATED ORAL at 22:25

## 2021-09-08 RX ADMIN — SODIUM CHLORIDE: 900 INJECTION, SOLUTION INTRAVENOUS at 17:45

## 2021-09-08 RX ADMIN — DOCUSATE SODIUM 50 MG AND SENNOSIDES 8.6 MG 1 TABLET: 8.6; 5 TABLET, FILM COATED ORAL at 19:33

## 2021-09-08 RX ADMIN — TRAMADOL HYDROCHLORIDE 50 MG: 50 TABLET, FILM COATED ORAL at 03:54

## 2021-09-08 RX ADMIN — IBUPROFEN 800 MG: 600 TABLET ORAL at 22:24

## 2021-09-08 RX ADMIN — SODIUM CHLORIDE: 900 INJECTION, SOLUTION INTRAVENOUS at 00:50

## 2021-09-08 RX ADMIN — TRAMADOL HYDROCHLORIDE 50 MG: 50 TABLET, FILM COATED ORAL at 08:30

## 2021-09-08 RX ADMIN — OXYCODONE HYDROCHLORIDE 5 MG: 5 TABLET ORAL at 01:36

## 2021-09-08 RX ADMIN — LIDOCAINE HYDROCHLORIDE 30 ML: 20 SOLUTION ORAL; TOPICAL at 09:57

## 2021-09-08 RX ADMIN — BICTEGRAVIR SODIUM, EMTRICITABINE, AND TENOFOVIR ALAFENAMIDE FUMARATE 1 TABLET: 50; 200; 25 TABLET ORAL at 09:09

## 2021-09-08 RX ADMIN — IBUPROFEN 400 MG: 200 TABLET, FILM COATED ORAL at 07:54

## 2021-09-08 RX ADMIN — IBUPROFEN 800 MG: 600 TABLET ORAL at 16:24

## 2021-09-08 ASSESSMENT — MIFFLIN-ST. JEOR: SCORE: 1494.25

## 2021-09-08 NOTE — TELEPHONE ENCOUNTER
LVM attempting to schedule pt. Please schedule with a non operative provider within 1 to 2 weeks.     Radha Cassidy ATC

## 2021-09-08 NOTE — TELEPHONE ENCOUNTER
----- Message from Renay Israel MD sent at 9/7/2021 11:28 PM CDT -----  Please schedule follow-up with nonoperative orthopedic provider regarding right distal radius/ulna fractures in 1 to 2 weeks with repeat x-rays right wrist obtained at that time.Thanks!

## 2021-09-08 NOTE — PROGRESS NOTES
[facility]    Medicine Progress Note - Emergency Department Observation Unit       Date of Admission:  9/7/2021    Assessment & Plan         Andrew Lockwood is a 53 year old male admitted on 9/7/2021. He has a past medical history significant for HIV, CNS Toxoplasmosis, T2DM on insulin, chronic abdominal pain and grade A esophagitis and presents to the Emergency Dept for evaluation of right hand pain after sustaining a ground-level fall.      #Right distal radius/ulna fractures  53yr old, right-hand dominant male who sustained right distal radius/ulna fractures following a mechanical, ground-level fall. Pt has stable VSS. XR RIGHT FOREARM shows: Comminuted mildly displaced fractures of the distal radial and ulnar metaphyses. No evidence of additional fractures. Fractures were reduced in the ED nd post XR shows improvement in alignment. Orthopedics evaluated pt in the ED and recommend use of SCDs, mobilize digits for edema mgmt, non-weight bearing right UE, followup in Ortho Clinic in 1-2 weeks for repeat XR. Patient reports he prefers tramadol for pain management.   -Tramadol PRN pain  -Ibuprofen 800 mg q 6 hours prn.   -Dilaudid PRN severe pain  -SCDs for VTE prophy  -Senna and miralax daily  -PT/OT eval  -SW consult     #T2DM: Last HgbA1c: 10.3 on 8/24/21. During admission in July 2021, pt had abdominal pain with glargine, was switched to levemir and sent home with glucometer. High dose sliding scale coverage utilized with meals. Pt has not followed up with endocrine. Will continue this sliding scale coverage during current admission.. /277/276  -BG checks QID  -Novolog sliding scale coverage- high dose  -Levemir pen BID 35 units BID  - Endocrine consulted     #HIV: diagnosed w/ HIV in 4/2015. Has been on Biktarvy since 7/2019. Per Infectious disease note 8/24/21: Tolerating medication and has good adherence. In June 2021 VL UD and CD4 540/21%. Prophy:CD4 >200-none  at this time  -Continue PTA Biktarvy     #CNS Toxoplasmosis: diagnosed at time of HIV diagnosis. 2 rim enhancing lesions in the cerebellum/pedicle (biopsy). Received clindamycin and pyrimethamine. Eventually transitioned to bactrim for secondary toxo prophy and PJP prophy. Tmp/smx stopped 11/2019.  Historically he has had memory issues after this diagnosis.     #Chronic abdominal pain and Grade A esophagitis: history of mild small bowel obstruction 3/7/20, 11/11/20 and 7/13/21. Recurrent ED presentations.Most recent 8/23/21 CT A/P showed a distended stomach w/o outlet obstruction and prostate enlargement. Last colonoscopy 2016-normal appearing colon. Small bowel enteroscopy showed grade A reflux esophagitis.  -Continue PTA pantoprazole     #Homelessness: He is currently homeless. During COVID he has not been going to shelters. He typically sleeps on benches, under bridges or with friends. During the winter he stayed with a friend. Community case workers and [facility] are trying to get [facility] stable housing. Since patient was diagnosed with HIV he has struggled with housing instability.   -SW Consult        Diet:   moderate carbohydrate  DVT Prophylaxis: Pneumatic Compression Devices  Potts Catheter: Not present  Central Lines: None  Code Status:   full    Disposition Plan   Expected discharge:Tomorrow recommended to transitional care unit once safe disposition plan/ TCU bed available.     The patient's care was discussed with the Attending Physician, Dr. Mauricio, Bedside Nurse and Patient.    VIKASH Harris CNP                     ______________________________________________________________________    Interval History   Admitted overnight    Data reviewed today: I reviewed all medications, new labs and imaging results over the last 24 hours.   Physical Exam   Vital Signs: Temp: 99.2  F (37.3  C) Temp src: Oral BP: 136/83 Pulse: 92   Resp: 16 SpO2: 99 % O2 Device: None (Room air)    Weight: 171 lbs 15.34  oz     Constitutional: awake, alert, cooperative, no apparent distress, and appears stated age  Eyes: Lids and lashes normal, pupils equal, round and reactive to light, extra ocular muscles intact, sclera clear, conjunctiva normal  ENT: Normocephalic, atraumatic, external ears without lesions, oral pharynx with moist mucous membranes,  gums normal and good dentition.  Respiratory: No increased work of breathing, good air exchange, clear to auscultation bilaterally, no crackles or wheezing  Cardiovascular: Normal apical impulse, regular rate and rhythm, normal S1 and S2, no S3 or S4, and no murmur noted  Abdomen: Normal bowel sounds, soft, non-distended, non-tender  Skin: normal skin color, texture, turgor and no redness, warmth, or swelling  Musculoskeletal: There is no redness, warmth, or swelling of the joints.  Full range of motion noted.  Motor strength is 5 out of 5 all extremities bilaterally.  Tone is normal.  Neurologic: Awake, alert, oriented to name, place and time.  Cranial nerves II-XII are grossly intact.  Motor is 5 out of 5 bilaterally.  Gait is normal.  Psychiatric: Mood and Affect: Mood and affect normal.    Behavior normal.       Data   Recent Labs   Lab 09/08/21  1227 09/08/21  0809 09/08/21  0127 09/07/21  1657   WBC  --   --   --  6.3   HGB  --   --   --  13.7   MCV  --   --   --  87   PLT  --   --   --  379   NA  --   --   --  134   POTASSIUM  --   --   --  4.0   CHLORIDE  --   --   --  105   CO2  --   --   --  23   BUN  --   --   --  17   CR  --   --   --  0.84   ANIONGAP  --   --   --  6   LINDA  --   --   --  9.1   * 277* 306* 427*   ALBUMIN  --   --   --  3.4   PROTTOTAL  --   --   --  7.4   BILITOTAL  --   --   --  0.4   ALKPHOS  --   --   --  169*   ALT  --   --   --  23   AST  --   --   --  17     Recent Results (from the past 24 hour(s))   XR Wrist Right 3 Views    Narrative    EXAM: XR WRIST RIGHT G/E 3 VIEWS  LOCATION: [facility]  DATE/TIME: 9/7/2021 5:08 PM    INDICATION: trauma  COMPARISON: None.      Impression    IMPRESSION: Comminuted fractures of the distal radial metaphysis with impaction dorsally resulting in apex volar angulation. Dorsally the fracture approaches and may extend to the distal articular surface. Fracture of the distal ulnar metaphysis with   apex volar angulation.   Radius/Ulna XR, PA & LAT, right    Narrative    EXAM: XR FOREARM RIGHT 2 VIEWS  LOCATION: [facility]  DATE/TIME: 9/7/2021 7:18 PM    INDICATION: Wrist pain.  COMPARISON: None.      Impression    IMPRESSION: Comminuted mildly displaced fractures of the distal radial and ulnar metaphyses. No evidence of additional fractures.   XR Wrist Right 3 Views    Narrative    EXAM: XR WRIST RIGHT G/E 3 VIEWS  LOCATION: [facility]  DATE/TIME: 9/7/2021 7:18 PM    INDICATION: Wrist pain. Status post fracture reduction.  COMPARISON: 09/07/2021 at 1712 hours.      Impression    IMPRESSION: Comminuted mildly displaced fractures of the distal radial and ulnar metaphyses with improvement in the degree of angulation of the fracture fragments when compared with the prior study.     Medications     sodium chloride Stopped (09/08/21 0350)     sodium chloride         bictegravir-emtricitabine-tenofovir  1 tablet Oral Daily     bupivacaine (PF)         insulin aspart  1-10 Units Subcutaneous TID AC     insulin aspart  1-7 Units Subcutaneous At Bedtime     insulin detemir  35 Units Subcutaneous BID     oxyCODONE  5 mg Oral Once     pantoprazole  40 mg Oral Daily     polyethylene glycol  17 g Oral Daily     senna-docusate  1 tablet Oral BID     sodium chloride (PF)  3 mL Intracatheter Q8H

## 2021-09-08 NOTE — H&P
[facility]    History and Physical - ED Observation       Date of Admission:  9/7/2021    Assessment & Plan      Andrew Lockwood is a 53 year old male admitted on 9/7/2021. He has a past medical history significant for HIV, CNS Toxoplasmosis, T2DM on insulin, chronic abdominal pain and grade A esophagitis and presents to the Emergency Dept for evaluation of right hand pain after sustaining a ground-level fall.     #Right distal radius/ulna fractures  53yr old, right-hand dominant male who sustained right distal radius/ulna fractures following a mechanical, ground-level fall. Pt has stable VSS. XR RIGHT FOREARM shows: Comminuted mildly displaced fractures of the distal radial and ulnar metaphyses. No evidence of additional fractures. Fractures were reduced in the ED nd post XR shows improvement in alignment. Orthopedics evaluated pt in the ED and recommend use of SCDs, mobilize digits for edema mgmt, non-weight bearing right UE, followup in Ortho Clinic in 1-2 weeks for repeat XR. Medications: IVF bolus, oxycodone 5mg PO, Dilaudid 0.5mg IV x2, ketamine 15mg IVx1, GI cocktail. Pt upset about use of oxycodone, reports he is worried about becoming addicted. Plan: Admit to ED Observation for pain management and consideration for placement given this fracture of dominant hand and patients homeless status.   -[place] to ED Obs  -VS Q4hrs  -Tramadol PRN pain  -Ibuprofen TID with food  -Dilaudid PRN severe pain  -SCDs for VTE prophy  -Senna and miralax daily  -PT/OT eval  -SW consult    #T2DM: Last HgbA1c: 10.3 on 8/24/21. During admission in July 2021, pt had abdominal pain with glargine, was switched to levemir and sent home with glucometer. High dose sliding scale coverage utilized with meals. Pt has not followed up with endocrine. Will continue this sliding scale coverage during current admission. Low threshold for endocrine consult.   -POC glucose checks QID  -Novolog sliding  scale coverage- high dose  -Levemir pen BID 35 units BID    #HIV: diagnosed w/ HIV in 4/2015. Has been on Biktarvy since 7/2019. Per Infectious disease note 8/24/21: Tolerating medication and has good adherence. In June 2021 VL UD and CD4 540/21%. Prophy:CD4 >200-none at this time  -Continue PTA Biktarvy    #CNS Toxoplasmosis: diagnosed at time of HIV diagnosis. 2 rim enhancing lesions in the cerebellum/pedicle (biopsy). Received clindamycin and pyrimethamine. Eventually transitioned to bactrim for secondary toxo prophy and PJP prophy. Tmp/smx stopped 11/2019.  Historically he has had memory issues after this diagnosis.    #Chronic abdominal pain and Grade A esophagitis: history of mild small bowel obstruction 3/7/20, 11/11/20 and 7/13/21. Recurrent ED presentations.Most recent 8/23/21 CT A/P showed a distended stomach w/o outlet obstruction and prostate enlargement. Last colonoscopy 2016-normal appearing colon. Small bowel enteroscopy showed grade A reflux esophagitis.  -Continue PTA pantoprazole    #Homelessness: He is currently homeless. During COVID he has not been going to shelters. He typically sleeps on benches, under bridges or with friends. During the winter he stayed with a friend. Community case workers and Simon Cadena are trying to get Andrew stable housing. Since patient was diagnosed with HIV he has struggled with housing instability.   -SW Consult     Diet:   moderate carbohydrate  DVT Prophylaxis: Pneumatic Compression Devices  Potts Catheter: Not present  Central Lines: None  Code Status:   full      Disposition Plan   Expected discharge:  2-3 days, recommended to transitional care unit once adequate pain management/ tolerating PO medications and safe disposition plan/ TCU bed available.     The patient's care was discussed with the Bedside Nurse, Patient and ED MD, Dr Cloud.    Sharon Casarez, [facility]  ED Observation,  "[id number]  ______________________________________________________________________    Chief Complaint   Right hand pain, fall    History is obtained from the patient    History of Present Illness   Per ED, \"Andrew Lockwood is a 53 year old male with a PMH notable for HIV/AIDS, diabetes type 2, GERD, seizures, substance abuse, and hyperlipidemia who presents to the ED via EMS for an evaluation of a fall. Patient reports that he was crossing the train tracks and tripped and landed on his right wrist. He states that the pain is localized in his right wrist. Denies any arm or shoulder pain. He states that he has titanium in his right arm from a previous injury. Patient reports allergies to penicillin and metformin. Patient notes that he is homeless but is currently staying with a friend.\"    Review of Systems    A complete review of systems was performed with pertinent positives and negatives noted in the HPI, and all other systems negative.    Past Medical History    I have reviewed this patient's medical history and updated it with pertinent information if needed.   Past Medical History:   Diagnosis Date     AIDS (H)      Allergic rhinitis due to other allergen     DNS     Anal dysplasia      Chronic abdominal pain      CNS toxoplasmosis (H)      Diabetes type 2, controlled (H)      GERD (gastroesophageal reflux disease)      History of seizure      History of substance abuse (H)      HIV (human immunodeficiency virus infection) (H)      HLD (hyperlipidemia)      Lung nodules      Periungual wart      PTSD (post-traumatic stress disorder)      Sleep apnea     doesn't use CPAP       Past Surgical History   I have reviewed this patient's surgical history and updated it with pertinent information if needed.  Past Surgical History:   Procedure Laterality Date     ANOSCOPY N/A 9/9/2020    Procedure: Exam Under Anesthesia, ANOSCOPY, fulgeration of rectal fissures with Rectal Biopsies;  Surgeon: Thanh Lundberg MD;  Location: " [facility] OR     COLONOSCOPY Left 1/22/2016    Procedure: COMBINED COLONOSCOPY, SINGLE OR MULTIPLE BIOPSY/POLYPECTOMY BY BIOPSY;  Surgeon: Clark Saini MD;  Location: [facility] GI     HC EXPLORE UNDESC TESTIS,INGUIN/SCROTAL       LAPAROSCOPIC APPENDECTOMY N/A 1/31/2018    Procedure: LAPAROSCOPIC APPENDECTOMY;  LAPAROSCOPIC APPENDECTOMY;  Surgeon: Dawn Holt MD;  Location: [facility] OR     LAPAROSCOPY DIAGNOSTIC (GENERAL) N/A 7/26/2016    Procedure: LAPAROSCOPY DIAGNOSTIC (GENERAL);  Surgeon: Susannah Arriaga MD;  Location: [facility] OR     LAPAROSCOPY DIAGNOSTIC (GENERAL) N/A 4/16/2018    Procedure: LAPAROSCOPY DIAGNOSTIC (GENERAL);  Diagnostic laparoscopy and lysis of adhesions;  Surgeon: Prince Dowling MD;  Location: [facility] OR     OPTICAL TRACKING SYSTEM CRANIOTOMY, EXCISE TUMOR, COMBINED Left 4/10/2015    Procedure: COMBINED OPTICAL TRACKING SYSTEM CRANIOTOMY, EXCISE TUMOR;  Surgeon: Mirlande Colmenares MD;  Location: [facility] OR     REPAIR GAMEKEEPER'S THUMB Right 12/2/2016    Procedure: REPAIR LIGAMENT ULNAR COLLATERAL THUMB (GAMEKEEPER'S);  Surgeon: Evin Zamorano MD;  Location: [facility] OR     ZZC NONSPECIFIC PROCEDURE      right forearm fracture       Social History   I have reviewed this patient's social history and updated it with pertinent information if needed.  Social History     Tobacco Use     Smoking status: Current Every Day Smoker     Packs/day: 0.50     Years: 40.00     Pack years: 20.00     Types: Cigarettes     Smokeless tobacco: Former User   Substance Use Topics     Alcohol use: No     Alcohol/week: 0.0 standard drinks     Comment: Last etoh in 2007     Drug use: Not Currently     Types: Marijuana, Methamphetamines     Comment: Sober 9 months        Family History   I have reviewed this patient's family history and updated it with pertinent information if needed.  Family History   Problem Relation Age of Onset     Diabetes Brother      Diabetes Father      Alzheimer Disease Father       Unknown/Adopted Mother      Diabetes Paternal Grandfather      Cancer No family hx of         no skin cancer     Skin Cancer No family hx of         no famiy hx of skin cancer     Glaucoma No family hx of      Macular Degeneration No family hx of        Prior to Admission Medications   Prior to Admission Medications   Prescriptions Last Dose Informant Patient Reported? Taking?   Lancets (ONETOUCH DELICA PLUS [id number]) MISC  Self No No   Sig: USE TO TEST BLOOD SUGAR FOUR TIMES DAILY OR AS DIRECTED   NOVOLOG FLEXPEN 100 UNIT/ML soln   No No   Sig: Inject 20 units with each meal, along with sliding scale 1 unit per 25mg/dL over 140 before meals and over 200 at bedtime. Max daily dose 90 units   SENNA-docusate sodium (SENNA S) 8.6-50 MG tablet  Self No No   Sig: Take 1 tablet by mouth 2 times daily Hold for loose stools.   STATIN NOT PRESCRIBED (INTENTIONAL)  Self No No   Sig: Please choose reason not prescribed, below   Sharps Container MISC   No No   Sig: Use as directed to dispose of needles, lancets and other sharps. Per Insurance coverage   alcohol swab prep pads   No No   Sig: Use to swab area of injection/todd as directed.   bictegravir-emtricitabine-tenofovir (BIKTARVY) -25 MG per tablet   No No   Sig: Take 1 tablet by mouth daily   blood glucose (NO BRAND SPECIFIED) lancets standard   No No   Sig: To use to test glucose level in the blood. Use to test blood sugar  4  times daily as directed. To accompany glucose monitor brands per insurance coverage.   blood glucose (NO BRAND SPECIFIED) lancets standard   No No   Sig: Use to test blood sugar four times daily or as directed.   blood glucose (NO BRAND SPECIFIED) test strip   No No   Sig: To use to test glucose level in the blood. Use to test blood sugar 4 times daily as directed. To accompany glucose monitor brands per insurance coverage.   blood glucose (NO BRAND SPECIFIED) test strip   No No   Si strip by In Vitro route 4 times daily (before meals  and nightly) Use to test blood sugars 4 times daily or as directed   blood glucose (NO BRAND SPECIFIED) test strip   No No   Sig: Use to test blood sugar 4 times daily or as directed. To accompany:whatever is covered   blood glucose (NO BRAND SPECIFIED) test strip   No No   Sig: Use to test blood sugar 4-6 times daily or as directed. To accompany: Blood Glucose Monitor Brands: per insurance.   blood glucose calibration (NO BRAND SPECIFIED) solution   No No   Sig: Used to calibrate the blood glucose monitor as needed and as directed.  To accompany  blood glucose brands per insurance coverage   blood glucose calibration (NO BRAND SPECIFIED) solution   No No   Sig: To accompany: whatever is covered   blood glucose calibration (NO BRAND SPECIFIED) solution   No No   Sig: To accompany: Blood Glucose Monitor Brands: per insurance.   blood glucose monitoring (NO BRAND SPECIFIED) meter device kit   No No   Sig: Use as directed. Per insurance coverage.   blood glucose monitoring (NO BRAND SPECIFIED) meter device kit   No No   Sig: Use to test blood sugar 4 times daily or as directed.   blood glucose monitoring (NO BRAND SPECIFIED) meter device kit   No No   Sig: Use to test blood sugar 4 times daily or as directed. Preferred blood glucose meter OR supplies to accompany: whatever is covered   blood glucose monitoring (NO BRAND SPECIFIED) meter device kit   No No   Sig: Use to test blood sugar 4-6 times daily or as directed. Preferred blood glucose meter OR supplies to accompany: Blood Glucose Monitor Brands: per insurance.   famotidine (PEPCID) 20 MG tablet   No No   Sig: Take 1 tablet (20 mg) by mouth 2 times daily   glucose 40 % (400 mg/mL) gel   No No   Sig: 15 g every 15 minutes by mouth as needed for low blood sugar. Oral gel is preferable for conscious and able to swallow patient.   insulin detemir (LEVEMIR PEN) 100 UNIT/ML pen   No No   Sig: Inject 35 Units Subcutaneous 2 times daily   insulin pen needle (32G X 4 MM)  "32G X 4 MM miscellaneous   No No   Sig: Use as directed by provider. Per insurance coverage   insulin pen needle (B-D U/F) 31G X 8 MM miscellaneous   No No   Sig: Use 1 pen needles daily or as directed.   insulin pen needle (BD SCOTT U/F) 32G X 4 MM miscellaneous   No No   Sig: USE 3 TO 4 NEEDLES DAILY   pantoprazole (PROTONIX) 40 MG EC tablet   No No   Sig: Take 1 tablet (40 mg) by mouth daily   thin (NO BRAND SPECIFIED) lancets   No No   Sig: Use with lanceting device. To accompany: Test 4 times daily with whatever is covered   thin (NO BRAND SPECIFIED) lancets   No No   Sig: Use with lanceting device. To accompany: Blood Glucose Monitor Brands: per insurance.      Facility-Administered Medications: None     Allergies   Allergies   Allergen Reactions     Metformin      Abdominal pain     Milk [Lac Bovis] Hives     Tylenol [Acetaminophen] Itching     Dulaglutide Rash     Penicillin V Other (See Comments) and Rash     Diffuse maculopapular rash + feels \"high\", per pt.        Physical Exam   Vital Signs: Temp: 99.2  F (37.3  C) Temp src: Oral BP: (!) 163/109 Pulse: 114   Resp: 16 SpO2: 98 % O2 Device: None (Room air)    Weight: 0 lbs 0 oz    Constitutional: awake, alert, cooperative, no apparent distress, and appears stated age  Eyes: Lids and lashes normal, pupils equal, round and reactive to light, extra ocular muscles intact, sclera clear, conjunctiva normal  ENT: Normocephalic, atraumatic, external ears without lesions, oral pharynx with moist mucous membranes,  gums normal and good dentition.  Respiratory: No increased work of breathing, good air exchange, clear to auscultation bilaterally, no crackles or wheezing  Cardiovascular: Normal apical impulse, regular rate and rhythm, normal S1 and S2, no S3 or S4, and no murmur noted  Abdomen: Normal bowel sounds, soft, non-distended, non-tender  Skin: normal skin color, texture, turgor and no redness, warmth, or swelling  Musculoskeletal: There is no redness, warmth, " or swelling of the joints.  Full range of motion noted.  Motor strength is 5 out of 5 all extremities bilaterally.  Tone is normal.  Neurologic: Awake, alert, oriented to name, place and time.  Cranial nerves II-XII are grossly intact.  Motor is 5 out of 5 bilaterally.  Gait is normal.  Psychiatric: Mood and Affect: Mood and affect normal.    Behavior normal.    Data   Data reviewed today: I reviewed all medications, new labs and imaging results over the last 24 hours. I personally reviewed the XR RIGHT FOREARM image(s) showing Comminuted mildly displaced fractures of the distal radial and ulnar metaphyses. No evidence of additional fractures..    Recent Labs   Lab 09/07/21  1657   WBC 6.3   HGB 13.7   MCV 87         POTASSIUM 4.0   CHLORIDE 105   CO2 23   BUN 17   CR 0.84   ANIONGAP 6   LINDA 9.1   *   ALBUMIN 3.4   PROTTOTAL 7.4   BILITOTAL 0.4   ALKPHOS 169*   ALT 23   AST 17     Recent Results (from the past 24 hour(s))   XR Wrist Right 3 Views    Narrative    EXAM: XR WRIST RIGHT G/E 3 VIEWS  LOCATION: [facility]  DATE/TIME: 9/7/2021 5:08 PM    INDICATION: trauma  COMPARISON: None.      Impression    IMPRESSION: Comminuted fractures of the distal radial metaphysis with impaction dorsally resulting in apex volar angulation. Dorsally the fracture approaches and may extend to the distal articular surface. Fracture of the distal ulnar metaphysis with   apex volar angulation.   Radius/Ulna XR, PA & LAT, right    Narrative    EXAM: XR FOREARM RIGHT 2 VIEWS  LOCATION: [facility]  DATE/TIME: 9/7/2021 7:18 PM    INDICATION: Wrist pain.  COMPARISON: None.      Impression    IMPRESSION: Comminuted mildly displaced fractures of the distal radial and ulnar metaphyses. No evidence of additional fractures.   XR Wrist Right 3 Views    Narrative    EXAM: XR WRIST RIGHT G/E 3 VIEWS  LOCATION: [facility]  DATE/TIME: 9/7/2021 7:18 PM    INDICATION: Wrist pain. Status post fracture reduction.  COMPARISON: 09/07/2021 at 1712 hours.      Impression    IMPRESSION: Comminuted mildly displaced fractures of the distal radial and ulnar metaphyses with improvement in the degree of angulation of the fracture fragments when compared with the prior study.

## 2021-09-08 NOTE — CONSULTS
[facility]  ORTHOPAEDIC SURGERY CONSULT - HISTORY AND PHYSICAL    DATE OF CONSULT: 9/7/2021 11:16 PM    REQUESTING PROVIDER: Ruel Cloud MD - [facility] Staff.    CC: Right wrist pain    DATE OF INJURY: 9/7/2021    HISTORY OF PRESENT ILLNESS:   The orthopaedic surgery service was consulted by Ruel Virgen for evaluation and treatment recommendations of right wrist pain.    Andrew Lockwood is a 53 year old right-hand dominant male with a past medical history notable for HIV/AIDS, diabetes type 2, GERD, seizures, substance abuse, and hyperlipidemia who presented to the emergency department following a fall.  Patient states that he was crossing the train tracks when he tripped, and landed on his outstretched right upper extremity.  He reports hearing a 'pop' upon landing.  Noted immediate right wrist pain.    Patient does acknowledge to prior right upper extremity fractures.  He states that he sustained a right thumb fracture approximately 2 years ago which was treated surgically, and a right forearm diaphyseal fracture when he was 7 years old also treated surgically.  He denies any postoperative complications.    Of note, patient is currently staying with a friend however, is homeless.    Denies numbness, tingling, or weakness to the affected extremities.  Denies fevers, chills, nausea, vomiting, diarrhea, constipation, chest pain, shortness of breath.    PAST MEDICAL HISTORY:   Past Medical History:   Diagnosis Date     AIDS (H)      Allergic rhinitis due to other allergen     DNS     Anal dysplasia      Chronic abdominal pain      CNS toxoplasmosis (H)      Diabetes type 2, controlled (H)      GERD (gastroesophageal reflux disease)      History of seizure      History of substance abuse (H)      HIV (human immunodeficiency virus infection) (H)      HLD (hyperlipidemia)      Lung nodules      Periungual wart      PTSD (post-traumatic stress disorder)      Sleep apnea     doesn't  use CPAP     [Patient denies any personal history of bleeding disorders, clotting disorders, or adverse reactions to anesthesia].    PAST SURGICAL HISTORY:    Past Surgical History:   Procedure Laterality Date     ANOSCOPY N/A 9/9/2020    Procedure: Exam Under Anesthesia, ANOSCOPY, fulgeration of rectal fissures with Rectal Biopsies;  Surgeon: Thanh Lundberg MD;  Location: [facility] OR     COLONOSCOPY Left 1/22/2016    Procedure: COMBINED COLONOSCOPY, SINGLE OR MULTIPLE BIOPSY/POLYPECTOMY BY BIOPSY;  Surgeon: Clark Saini MD;  Location: [facility] GI     HC EXPLORE UNDESC TESTIS,INGUIN/SCROTAL       LAPAROSCOPIC APPENDECTOMY N/A 1/31/2018    Procedure: LAPAROSCOPIC APPENDECTOMY;  LAPAROSCOPIC APPENDECTOMY;  Surgeon: Dawn Holt MD;  Location: [facility] OR     LAPAROSCOPY DIAGNOSTIC (GENERAL) N/A 7/26/2016    Procedure: LAPAROSCOPY DIAGNOSTIC (GENERAL);  Surgeon: Susannah Arriaga MD;  Location: [facility] OR     LAPAROSCOPY DIAGNOSTIC (GENERAL) N/A 4/16/2018    Procedure: LAPAROSCOPY DIAGNOSTIC (GENERAL);  Diagnostic laparoscopy and lysis of adhesions;  Surgeon: Prince Dowling MD;  Location: [facility] OR     OPTICAL TRACKING SYSTEM CRANIOTOMY, EXCISE TUMOR, COMBINED Left 4/10/2015    Procedure: COMBINED OPTICAL TRACKING SYSTEM CRANIOTOMY, EXCISE TUMOR;  Surgeon: Mirlande Colmenares MD;  Location: [facility] OR     REPAIR GAMEKEEPER'S THUMB Right 12/2/2016    Procedure: REPAIR LIGAMENT ULNAR COLLATERAL THUMB (GAMEKEEPER'S);  Surgeon: Evin Zamorano MD;  Location:  OR     [facility] NONSPECIFIC PROCEDURE      right forearm fracture       MEDICATIONS:   Prior to Admission medications    Medication Sig Last Dose Taking? Auth Provider   alcohol swab prep pads Use to swab area of injection/todd as directed.   Camila Pantoja, BHAVANA   bictegravir-emtricitabine-tenofovir (BIKTARVY) -25 MG per tablet Take 1 tablet by mouth daily   Benita Ivory, DO   blood glucose (NO BRAND SPECIFIED) lancets standard To use to  test glucose level in the blood. Use to test blood sugar  4  times daily as directed. To accompany glucose monitor brands per insurance coverage.   Camila Pantoja PA-C   blood glucose (NO BRAND SPECIFIED) lancets standard Use to test blood sugar four times daily or as directed.   Camila Pantoja PA-C   blood glucose (NO BRAND SPECIFIED) test strip To use to test glucose level in the blood. Use to test blood sugar 4 times daily as directed. To accompany glucose monitor brands per insurance coverage.   Camila Pantoja PA-C   blood glucose (NO BRAND SPECIFIED) test strip 1 strip by In Vitro route 4 times daily (before meals and nightly) Use to test blood sugars 4 times daily or as directed   Camila Pantoja PA-C   blood glucose (NO BRAND SPECIFIED) test strip Use to test blood sugar 4 times daily or as directed. To accompany:whatever is covered   Camila Pantoja PA-C   blood glucose (NO BRAND SPECIFIED) test strip Use to test blood sugar 4-6 times daily or as directed. To accompany: Blood Glucose Monitor Brands: per insurance.   Camila Pantoja PA-C   blood glucose calibration (NO BRAND SPECIFIED) solution Used to calibrate the blood glucose monitor as needed and as directed.  To accompany  blood glucose brands per insurance coverage   Camila Pantoja PA-C   blood glucose calibration (NO BRAND SPECIFIED) solution To accompany: whatever is covered   Camila Pantoja PA-C   blood glucose calibration (NO BRAND SPECIFIED) solution To accompany: Blood Glucose Monitor Brands: per insurance.   Camila Panotja PA-C   blood glucose monitoring (NO BRAND SPECIFIED) meter device kit Use as directed. Per insurance coverage.   Camila Pantoja PA-C   blood glucose monitoring (NO BRAND SPECIFIED) meter device kit Use to test blood sugar 4 times daily or as directed.   Camila Pantoja PA-C   blood glucose monitoring (NO BRAND  SPECIFIED) meter device kit Use to test blood sugar 4 times daily or as directed. Preferred blood glucose meter OR supplies to accompany: whatever is covered   Camila Pantoja PA-C   blood glucose monitoring (NO BRAND SPECIFIED) meter device kit Use to test blood sugar 4-6 times daily or as directed. Preferred blood glucose meter OR supplies to accompany: Blood Glucose Monitor Brands: per insurance.   Camila Pantoja PA-C   famotidine (PEPCID) 20 MG tablet Take 1 tablet (20 mg) by mouth 2 times daily   Ayala Prieto MD   glucose 40 % (400 mg/mL) gel 15 g every 15 minutes by mouth as needed for low blood sugar. Oral gel is preferable for conscious and able to swallow patient.   Camila Pantoja PA-C   insulin detemir (LEVEMIR PEN) 100 UNIT/ML pen Inject 35 Units Subcutaneous 2 times daily   Camila Pantoja PA-C   insulin pen needle (32G X 4 MM) 32G X 4 MM miscellaneous Use as directed by provider. Per insurance coverage   Camila Pantoja PA-C   insulin pen needle (B-D U/F) 31G X 8 MM miscellaneous Use 1 pen needles daily or as directed.   Camila Pantoja PA-C   insulin pen needle (BD SCOTT U/F) 32G X 4 MM miscellaneous USE 3 TO 4 NEEDLES DAILY   Camila Pantoja PA-C   Lancets (ONETOUCH DELICA PLUS [id number]) MISC USE TO TEST BLOOD SUGAR FOUR TIMES DAILY OR AS DIRECTED   Caitie Lamb MD   NOVOLOG FLEXPEN 100 UNIT/ML soln Inject 20 units with each meal, along with sliding scale 1 unit per 25mg/dL over 140 before meals and over 200 at bedtime. Max daily dose 90 units   Camila Pantoja PA-C   pantoprazole (PROTONIX) 40 MG EC tablet Take 1 tablet (40 mg) by mouth daily   Benita Ivory DO   SENNA-docusate sodium (SENNA S) 8.6-50 MG tablet Take 1 tablet by mouth 2 times daily Hold for loose stools.   Caitie Lamb MD   Sharps Container MISC Use as directed to dispose of needles, lancets and other sharps. Per Insurance  coverage   Camila Pantoja PA-C   STATIN NOT PRESCRIBED (INTENTIONAL) Please choose reason not prescribed, below   Caitie Lamb MD   thin (NO BRAND SPECIFIED) lancets Use with lanceting device. To accompany: Test 4 times daily with whatever is covered   Camila Pantoja PA-C   thin (NO BRAND SPECIFIED) lancets Use with lanceting device. To accompany: Blood Glucose Monitor Brands: per insurance.   Camila Pantoja PA-C       ALLERGIES:   Metformin, Milk [lac bovis], Tylenol [acetaminophen], Dulaglutide, and Penicillin v    SOCIAL HISTORY:   Social History     Socioeconomic History     Marital status: Single     Spouse name: Not on file     Number of children: Not on file     Years of education: Not on file     Highest education level: Not on file   Occupational History     Occupation:      Comment: 5 years; temp agency     Occupation: Unemployed   Tobacco Use     Smoking status: Current Every Day Smoker     Packs/day: 0.50     Years: 40.00     Pack years: 20.00     Types: Cigarettes     Smokeless tobacco: Former User   Substance and Sexual Activity     Alcohol use: No     Alcohol/week: 0.0 standard drinks     Comment: Last etoh in 2007     Drug use: Not Currently     Types: Marijuana, Methamphetamines     Comment: Sober 9 months      Sexual activity: Not Currently     Partners: Female, Male     Comment: Last sexual activity 2008   Other Topics Concern     Parent/sibling w/ CABG, MI or angioplasty before 65F 55M? Not Asked   Social History Narrative    Born in [facility].  Came to the [place] in 1993.  Last traveled to visit family in 2008.        1/7/2019 - Homeless. Working with a  at [employer]  trying to get housing. Sexually active with two partners recently using condoms 100% of the time. Smokes occasionally, not for the last 4 weeks.        1/7/2020 at [facility] since 1/1/2020. 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  Care established with ID and endocrine      Social Determinants of Health     Financial Resource Strain:      Difficulty of Paying Living Expenses:    Food Insecurity:      Worried About Running Out of Food in the Last Year:      Ran Out of Food in the Last Year:    Transportation Needs:      Lack of Transportation (Medical):      Lack of Transportation (Non-Medical):    Physical Activity:      Days of Exercise per Week:      Minutes of Exercise per Session:    Stress:      Feeling of Stress :    Social Connections:      Frequency of Communication with Friends and Family:      Frequency of Social Gatherings with Friends and Family:      Attends Christianity Services:      Active Member of Clubs or Organizations:      Attends Club or Organization Meetings:      Marital Status:    Intimate Partner Violence:      Fear of Current or Ex-Partner:      Emotionally Abused:      Physically Abused:      Sexually Abused:      Living situation: Homeless  Reports intermittent alcohol use  Acknowledges methamphetamine use  Smokes approximately 4-5 cigarettes/day    FAMILY HISTORY:  Family History   Problem Relation Age of Onset     Diabetes Brother      Diabetes Father      Alzheimer Disease Father      Unknown/Adopted Mother      Diabetes Paternal Grandfather      Cancer No family hx of         no skin cancer     Skin Cancer No family hx of         no famiy hx of skin cancer     Glaucoma No family hx of      Macular Degeneration No family hx of        Patient denies known family history of bleeding, clotting, or anesthesia related complications.     REVIEW OF SYSTEMS:   Otherwise a 10-point reviews of systems was negative except as noted above in the HPI.     PHYSICAL EXAM:   Vitals:    09/07/21 1643 09/07/21 1645 09/07/21 1830 09/07/21 1845   BP: (!) 162/94  (!) 161/109 (!) 163/109   Pulse: 106  105 114   Resp: 16      Temp: 99.2  F (37.3  C)      TempSrc: Oral      SpO2: 97% 100% 100% 98%     General: Awake, alert, appropriate, following commands, NAD.  Neuro: CN  II-XII grossly intact.   Skin: No rashes,  skin color normal.  HEENT: Normal.   Lungs: Breathing comfortably and nonlabored, no wheezes or stridor noted.  Heart/Cardiovascular: Regular pulse, no peripheral cyanosis.  Right Upper Extremity:   Sugar tong splint in place  Motor intact distally with finger flexion/extension/intrinsics/EPL, OK sign.   SILT m/r/u nerve distributions.   Brisk capillary refill    LABS:  Hemoglobin   Date Value Ref Range Status   09/07/2021 13.7 13.3 - 17.7 g/dL Final   08/24/2021 13.4 13.3 - 17.7 g/dL Final   06/24/2021 13.5 13.3 - 17.7 g/dL Final   06/15/2021 14.3 13.3 - 17.7 g/dL Final     WBC   Date Value Ref Range Status   06/24/2021 9.6 4.0 - 11.0 10e9/L Final     WBC Count   Date Value Ref Range Status   09/07/2021 6.3 4.0 - 11.0 10e3/uL Final     Platelet Count   Date Value Ref Range Status   09/07/2021 379 150 - 450 10e3/uL Final   06/24/2021 386 150 - 450 10e9/L Final     INR   Date Value Ref Range Status   12/19/2020 1.00 0.86 - 1.14 Final     Creatinine   Date Value Ref Range Status   09/07/2021 0.84 0.66 - 1.25 mg/dL Final   06/24/2021 0.81 0.66 - 1.25 mg/dL Final     Glucose   Date Value Ref Range Status   09/07/2021 427 (H) 70 - 99 mg/dL Final   06/24/2021 220 (H) 70 - 99 mg/dL Final       IMAGING:  XR right wrist demonstrates comminuted fractures of the distal radius, and ulna with associated dorsal angulation  XR right wrist status post reduction demonstrates improved alignment of both the distal radius, and ulna fractures.  Based upon post reduction films, radius appears to have intra-articular involvement     IMPRESSION:   Andrew Lockwood is a 53 year old right-hand dominant male who sustained right distal radius/ulna fractures following a ground-level fall.    Patient was placed in a sugar tong splint by the emergency department staff.     Discussed with patient that based upon the current alignment of his fractures it would be appropriate to pursue nonoperative  intervention however, would need to monitor fracture alignment.  Patient acknowledged understanding, and was comfortable with this plan.    Given patient's homeless status, emergency department staff plans to admit patient to observation.    RECOMMENDATIONS:   - Anticoagulation/DVT Prophylaxis: Mechanical  - X-rays/Imaging: No additional imaging needed from an orthopedic perspective  - Activity: Up ad lexi. Continue to mobilize digits in order to assist with edema management  - Weight bearing: Nonweightbearing right upper extremity  - Pain control: Defer to the emergency department  - Diet: Okay for diet from an orthopedic perspective  - Follow-up: Orthopedic clinic in 1 to 2 weeks with repeat x-rays right wrist obtained at that time  - Disposition: Observation    Yongchase Lindy 9/7/2021 11:16 PM  Orthopaedic Surgery Resident, PGY-4    For questions about this patient, please contact me at my pager.

## 2021-09-09 LAB
GLUCOSE BLDC GLUCOMTR-MCNC: 191 MG/DL (ref 70–99)
GLUCOSE BLDC GLUCOMTR-MCNC: 225 MG/DL (ref 70–99)
GLUCOSE BLDC GLUCOMTR-MCNC: 241 MG/DL (ref 70–99)
GLUCOSE BLDC GLUCOMTR-MCNC: 291 MG/DL (ref 70–99)
GLUCOSE BLDC GLUCOMTR-MCNC: 337 MG/DL (ref 70–99)
GLUCOSE BLDC GLUCOMTR-MCNC: 80 MG/DL (ref 70–99)
GLUCOSE BLDC GLUCOMTR-MCNC: 91 MG/DL (ref 70–99)

## 2021-09-09 PROCEDURE — 258N000003 HC RX IP 258 OP 636: Performed by: NURSE PRACTITIONER

## 2021-09-09 PROCEDURE — 96372 THER/PROPH/DIAG INJ SC/IM: CPT

## 2021-09-09 PROCEDURE — 250N000013 HC RX MED GY IP 250 OP 250 PS 637: Performed by: NURSE PRACTITIONER

## 2021-09-09 PROCEDURE — 999N000127 HC STATISTIC PERIPHERAL IV START W US GUIDANCE

## 2021-09-09 PROCEDURE — 999N000111 HC STATISTIC OT IP EVAL DEFER: Performed by: OCCUPATIONAL THERAPIST

## 2021-09-09 PROCEDURE — G0378 HOSPITAL OBSERVATION PER HR: HCPCS

## 2021-09-09 PROCEDURE — 99222 1ST HOSP IP/OBS MODERATE 55: CPT | Performed by: CLINICAL NURSE SPECIALIST

## 2021-09-09 PROCEDURE — 99224 PR SUBSEQUENT OBSERVATION CARE,LEVEL I: CPT | Performed by: NURSE PRACTITIONER

## 2021-09-09 RX ORDER — TRAMADOL HYDROCHLORIDE 50 MG/1
50 TABLET ORAL EVERY 6 HOURS PRN
Qty: 10 TABLET | Refills: 0 | Status: SHIPPED | OUTPATIENT
Start: 2021-09-09 | End: 2021-09-24

## 2021-09-09 RX ORDER — BLOOD PRESSURE TEST KIT
KIT MISCELLANEOUS
Qty: 100 EACH | Refills: 0 | Status: SHIPPED | OUTPATIENT
Start: 2021-09-09 | End: 2022-08-31

## 2021-09-09 RX ORDER — IBUPROFEN 800 MG/1
800 TABLET, FILM COATED ORAL EVERY 6 HOURS PRN
Qty: 10 TABLET | Refills: 0 | Status: SHIPPED | OUTPATIENT
Start: 2021-09-09 | End: 2021-09-24

## 2021-09-09 RX ORDER — SIMETHICONE 80 MG
80 TABLET,CHEWABLE ORAL EVERY 6 HOURS PRN
Status: DISCONTINUED | OUTPATIENT
Start: 2021-09-09 | End: 2021-09-10 | Stop reason: HOSPADM

## 2021-09-09 RX ORDER — INSULIN ASPART 100 [IU]/ML
10 INJECTION, SOLUTION INTRAVENOUS; SUBCUTANEOUS
Qty: 75 ML | Refills: 0 | Status: SHIPPED | OUTPATIENT
Start: 2021-09-09 | End: 2022-01-26

## 2021-09-09 RX ORDER — BISACODYL 5 MG
10 TABLET, DELAYED RELEASE (ENTERIC COATED) ORAL ONCE
Status: DISCONTINUED | OUTPATIENT
Start: 2021-09-09 | End: 2021-09-10 | Stop reason: HOSPADM

## 2021-09-09 RX ORDER — POLYETHYLENE GLYCOL 3350 17 G/17G
17 POWDER, FOR SOLUTION ORAL DAILY PRN
Qty: 510 G | Refills: 0 | Status: ON HOLD | OUTPATIENT
Start: 2021-09-09 | End: 2022-02-21

## 2021-09-09 RX ORDER — INSULIN ASPART 100 [IU]/ML
10 INJECTION, SOLUTION INTRAVENOUS; SUBCUTANEOUS
Qty: 75 ML | Refills: 0 | Status: SHIPPED | OUTPATIENT
Start: 2021-09-09 | End: 2021-09-09

## 2021-09-09 RX ORDER — POLYETHYLENE GLYCOL 3350 17 G/17G
17 POWDER, FOR SOLUTION ORAL 2 TIMES DAILY
Status: DISCONTINUED | OUTPATIENT
Start: 2021-09-10 | End: 2021-09-10 | Stop reason: HOSPADM

## 2021-09-09 RX ADMIN — SODIUM CHLORIDE: 900 INJECTION, SOLUTION INTRAVENOUS at 05:53

## 2021-09-09 RX ADMIN — TRAMADOL HYDROCHLORIDE 50 MG: 50 TABLET, FILM COATED ORAL at 14:09

## 2021-09-09 RX ADMIN — TRAMADOL HYDROCHLORIDE 100 MG: 50 TABLET, FILM COATED ORAL at 22:08

## 2021-09-09 RX ADMIN — TRAMADOL HYDROCHLORIDE 50 MG: 50 TABLET, FILM COATED ORAL at 15:53

## 2021-09-09 RX ADMIN — DOCUSATE SODIUM 50 MG AND SENNOSIDES 8.6 MG 1 TABLET: 8.6; 5 TABLET, FILM COATED ORAL at 08:07

## 2021-09-09 RX ADMIN — PANTOPRAZOLE SODIUM 40 MG: 40 TABLET, DELAYED RELEASE ORAL at 08:07

## 2021-09-09 RX ADMIN — SODIUM CHLORIDE: 900 INJECTION, SOLUTION INTRAVENOUS at 15:01

## 2021-09-09 RX ADMIN — POLYETHYLENE GLYCOL 3350 17 G: 17 POWDER, FOR SOLUTION ORAL at 08:07

## 2021-09-09 RX ADMIN — DOCUSATE SODIUM 50 MG AND SENNOSIDES 8.6 MG 1 TABLET: 8.6; 5 TABLET, FILM COATED ORAL at 20:43

## 2021-09-09 RX ADMIN — TRAMADOL HYDROCHLORIDE 50 MG: 50 TABLET, FILM COATED ORAL at 08:07

## 2021-09-09 RX ADMIN — BICTEGRAVIR SODIUM, EMTRICITABINE, AND TENOFOVIR ALAFENAMIDE FUMARATE 1 TABLET: 50; 200; 25 TABLET ORAL at 08:07

## 2021-09-09 NOTE — UTILIZATION REVIEW
Concurrent stay review; Secondary Review Determination     Under the authority of the Utilization Management Committee, the utilization review process indicated a secondary review on the above patient.  The review outcome is based on review of the medical records, discussions with staff, and applying clinical experience noted on the date of the review.          (x) Observation Status Appropriate - Concurrent stay review    RATIONALE FOR DETERMINATION   52 yo man with HIV, CNS Toxoplasmosis, T2DM on insulin, chronic abdominal pain and grade A esophagitis who had a mechanical fall resulting in right distal radius and ulna fractures. He is homeless so was observed for potential placement/shelter. Appears he will discharge today.    Patient is clinically improving and there is no clear indication to change patient's status to inpatient. The severity of illness, intensity of service provided, expected LOS and risk for adverse outcome make the care appropriate for observation.    This document was produced using voice recognition software     The information on this document is developed by the utilization review team in order for the business office to ensure compliance.  This only denotes the appropriateness of proper admission status and does not reflect the quality of care rendered.         The definitions of Inpatient Status and Observation Status used in making the determination above are those provided in the CMS Coverage Manual, Chapter 1 and Chapter 6, section 70.4.      Sincerely,   Dawn Valentine MD  Utilization Review  Physician Advisor  [facility].

## 2021-09-09 NOTE — PLAN OF CARE
OT: after chart review and observation with this pt it is concluded that this pt is not a candidate for acute OT. Pt up I in room with mobility and bed mobility. Pt with full understanding of R UE NWB and briefly educated in one handed dressing techniques and R FDS/FDP tendon gliding. Pt endorsing having no where to go and will likely be sleeping in the streets. This pt may benefit from  to homeless shelter. Defer acute OT today.

## 2021-09-09 NOTE — PROGRESS NOTES
[facility]  Emergency Department Observation Unit Daily Progress Note          Assessment & Plan:   Andrew Lockwood is a 53 year old male with a past medical history significant for HIV, CNS Toxoplasmosis, T2DM on insulin, chronic abdominal pain and grade A esophagitis who presented to the ED with right hand pain after sustaining a ground-level fall. Found to have right distal radius/ulna fracture.      ##Right distal radius/ulna fractures:  #Homelessness:   53yr old, right-hand dominant male who sustained right distal radius/ulna fractures following a mechanical, ground-level fall. Given that he is right hand dominant and currently homeless, he was admitted to observation for symptom management and safe disposition.  Pt has stable VSS. XR RIGHT FOREARM shows: Comminuted mildly displaced fractures of the distal radial and ulnar metaphyses. No evidence of additional fractures. Fractures were reduced in the ED nd post XR shows improvement in alignment. Orthopedics evaluated pt in the ED and recommended to use of SCDs, mobilize digits for edema mgmt, non-weight bearing right UE, followup in Ortho Clinic in 1-2 weeks for repeat XR. Patient reports he prefers tramadol for pain management. As above he is currently homeless. Community case workers and Simon Cadena has been working with him to establish stable housing.   - Tramadol PRN pain  - Ibuprofen 800 mg q 6 hours prn.   - Dilaudid PRN severe pain  - SCDs for VTE prophy  - Senna and miralax daily  - PT/OT eval  - SW consult     ##T2DM: Last HgbA1c: 10.3 on 8/24/21. During admission in July 2021, pt had abdominal pain with glargine, was switched to levemir and sent home with glucometer. High dose sliding scale coverage utilized with meals. Pt has not followed up with endocrine. BG elevated here.   - BG checks QID  - Novolog sliding scale coverage- high dose  - Levemir pen BID 35 units BID  - Endocrine consult     ##HIV:   -  "Continue PTA Biktarvy     ##CNS Toxoplasmosis: diagnosed at time of HIV diagnosis. 2 rim enhancing lesions in the cerebellum/pedicle (biopsy). Received clindamycin and pyrimethamine. Eventually transitioned to bactrim for secondary toxo prophy and PJP prophy. Tmp/smx stopped 11/2019.  Historically he has had memory issues after this diagnosis.  - Monitor      ##Chronic abdominal pain and Grade A esophagitis: history of mild small bowel obstruction 3/7/20, 11/11/20 and 7/13/21. Recurrent ED presentations.Most recent 8/23/21 CT A/P showed a distended stomach w/o outlet obstruction and prostate enlargement. Last colonoscopy 2016-normal appearing colon. Small bowel enteroscopy showed grade A reflux esophagitis.  - Continue PTA pantoprazole       FEN: 75 gram CHO Diet  Lines: PIV  Prophylaxis: SCD's          Consults:   SW  PT/OT  Endocrinology          Discharge Planning:   Pending safe disposition and improved BG        Interval History:   Resting in bed. Ongoing right arm pain. No acute changes. Agrees to SW, OT, and Endocrinology consults.     ROS:   Constitutional: No fevers/chills. Tolerating diet.   Cardiovascular: No chest pain or palpitations.   Respiratory: No cough or SOB.   GI: No abdominal pain. No N/V.               Physical Exam:   /84 (BP Location: Left arm)   Pulse 71   Temp 98  F (36.7  C) (Oral)   Resp 16   Ht 1.575 m (5' 2\")   Wt 78 kg (171 lb 15.3 oz)   SpO2 100%   BMI 31.45 kg/m       GENERAL: Alert and oriented x 3. NAD.   HEENT: Anicteric sclera. Mucous membranes moist.   CV: RRR. S1, S2. No murmurs appreciated.   RESPIRATORY: Effort normal. Lungs CTAB with no wheezing, rales, rhonchi.   GI: Abdomen soft and non distended with normoactive bowel sounds present in all quadrants. No tenderness, rebound, guarding.   NEUROLOGICAL: No focal deficits. Moves all extremities.    EXTREMITIES: Right hand in cast. Good capillary refill to fingers, fingers are warm and appear well perfused.  SKIN: " No jaundice. No rashes.     Medication list reviewed.   Today's labs and imaging were reviewed.     VIKASH Alvarez, CNP  Emergency Department Observation Unit    Results for orders placed or performed during the hospital encounter of 09/07/21   -Fracture     Status: None    Narrative    Ruel Cloud MD     9/7/2021  7:58 PM  [facility]    -Fracture  Performed by: Ruel Cloud MD  Authorized by: Ruel Cloud MD       INJURY      Injury location:  Forearm    Forearm injury location:  R forearm    Forearm fracture type: distal radius and ulnar styloid      PRE PROCEDURE ASSESSMENT      Neurological function: normal      Distal perfusion: normal      Range of motion: reduced      ANESTHESIA (see MAR for exact dosages)      Anesthesia method:  Nerve block    Block location:  Hematoma block    Block needle gauge:  27 G    Block anesthetic:  Bupivacaine 0.5% w/o epi    PROCEDURE DETAILS:     Manipulation performed: yes      Skin traction used: yes      Skeletal traction used: no      Pin inserted: no      Reduction successful: yes      X-ray confirmed reduction: yes      Immobilization:  Splint    Splint type:  Sugar tong    Supplies used:  Plaster, elastic bandage and cotton padding    POST PROCEDURE ASSESSMENT      Neurological function: normal      Distal perfusion: normal      PROCEDURE   Patient Tolerance:  Patient tolerated the procedure well with no immediate   complications     XR Wrist Right 3 Views     Status: None    Narrative    EXAM: XR WRIST RIGHT G/E 3 VIEWS  LOCATION: [facility]  DATE/TIME: 9/7/2021 5:08 PM    INDICATION: trauma  COMPARISON: None.      Impression    IMPRESSION: Comminuted fractures of the distal radial metaphysis with impaction dorsally resulting in apex volar angulation. Dorsally the fracture approaches and may extend to the distal articular surface.  Fracture of the distal ulnar metaphysis with   apex volar angulation.   XR Wrist Right 3 Views     Status: None    Narrative    EXAM: XR WRIST RIGHT G/E 3 VIEWS  LOCATION: [facility]  DATE/TIME: 9/7/2021 7:18 PM    INDICATION: Wrist pain. Status post fracture reduction.  COMPARISON: 09/07/2021 at 1712 hours.      Impression    IMPRESSION: Comminuted mildly displaced fractures of the distal radial and ulnar metaphyses with improvement in the degree of angulation of the fracture fragments when compared with the prior study.   Radius/Ulna XR, PA & LAT, right     Status: None    Narrative    EXAM: XR FOREARM RIGHT 2 VIEWS  LOCATION: [facility]  DATE/TIME: 9/7/2021 7:18 PM    INDICATION: Wrist pain.  COMPARISON: None.      Impression    IMPRESSION: Comminuted mildly displaced fractures of the distal radial and ulnar metaphyses. No evidence of additional fractures.   CBC with platelets differential     Status: Abnormal    Narrative    The following orders were created for panel order CBC with platelets differential.  Procedure                               Abnormality         Status                     ---------                               -----------         ------                     CBC with platelets and d...[[id number]]  Abnormal            Final result                 Please view results for these tests on the individual orders.   Comprehensive metabolic panel     Status: Abnormal   Result Value Ref Range    Sodium 134 133 - 144 mmol/L    Potassium 4.0 3.4 - 5.3 mmol/L    Chloride 105 94 - 109 mmol/L    Carbon Dioxide (CO2) 23 20 - 32 mmol/L    Anion Gap 6 3 - 14 mmol/L    Urea Nitrogen 17 7 - 30 mg/dL    Creatinine 0.84 0.66 - 1.25 mg/dL    Calcium 9.1 8.5 - 10.1 mg/dL    Glucose 427 (H) 70 - 99 mg/dL    Alkaline Phosphatase 169 (H) 40 - 150 U/L    AST 17 0 - 45 U/L    ALT 23 0 - 70 U/L    Protein Total 7.4 6.8 - 8.8 g/dL     Albumin 3.4 3.4 - 5.0 g/dL    Bilirubin Total 0.4 0.2 - 1.3 mg/dL    GFR Estimate >90 >60 mL/min/1.73m2   CBC with platelets and differential     Status: Abnormal   Result Value Ref Range    WBC Count 6.3 4.0 - 11.0 10e3/uL    RBC Count 4.63 4.40 - 5.90 10e6/uL    Hemoglobin 13.7 13.3 - 17.7 g/dL    Hematocrit 40.3 40.0 - 53.0 %    MCV 87 78 - 100 fL    MCH 29.6 26.5 - 33.0 pg    MCHC 34.0 31.5 - 36.5 g/dL    RDW 12.9 10.0 - 15.0 %    Platelet Count 379 150 - 450 10e3/uL    % Neutrophils 57 %    % Lymphocytes 32 %    % Monocytes 7 %    % Eosinophils 2 %    % Basophils 1 %    % Immature Granulocytes 1 %    NRBCs per 100 WBC 0 <1 /100    Absolute Neutrophils 3.6 1.6 - 8.3 10e3/uL    Absolute Lymphocytes 2.0 0.8 - 5.3 10e3/uL    Absolute Monocytes 0.5 0.0 - 1.3 10e3/uL    Absolute Eosinophils 0.1 0.0 - 0.7 10e3/uL    Absolute Basophils 0.1 0.0 - 0.2 10e3/uL    Absolute Immature Granulocytes 0.1 (H) <=0.0 10e3/uL    Absolute NRBCs 0.0 10e3/uL   Asymptomatic COVID-19 Virus (Coronavirus) by PCR Nasopharyngeal     Status: Normal    Specimen: Nasopharyngeal; Swab   Result Value Ref Range    SARS CoV2 PCR Negative Negative    Narrative    Testing was performed using the Xpert Xpress SARS-CoV-2 Assay on the  [employer]. Additional information about  this Emergency Use Authorization (EUA) assay can be found via the Lab  Guide. This test should be ordered for the detection of SARS-CoV-2 in  individuals who meet SARS-CoV-2 clinical and/or epidemiological  criteria. Test performance is unknown in asymptomatic patients. This  test is for in vitro diagnostic use under the FDA EUA for  laboratories certified under CLIA to perform high complexity testing.  This test has not been FDA cleared or approved. A negative result  does not rule out the presence of PCR inhibitors in the specimen or  target RNA in concentration below the limit of detection for the  assay. The possibility of a false negative should  be considered if  the patient's recent exposure or clinical presentation suggests  COVID-19. This test was validated by the [facility]. This laboratory is certified under  the Clinical Laboratory Improvement Amendments of 1988 (CLIA-88) as  qualified to perform high complexity laboratory testing.     Glucose by meter     Status: Abnormal   Result Value Ref Range    GLUCOSE BY METER POCT 306 (H) 70 - 99 mg/dL   Glucose by meter     Status: Abnormal   Result Value Ref Range    GLUCOSE BY METER POCT 277 (H) 70 - 99 mg/dL   Glucose by meter     Status: Abnormal   Result Value Ref Range    GLUCOSE BY METER POCT 276 (H) 70 - 99 mg/dL   Glucose by meter     Status: Abnormal   Result Value Ref Range    GLUCOSE BY METER POCT 295 (H) 70 - 99 mg/dL   Glucose by meter     Status: Abnormal   Result Value Ref Range    GLUCOSE BY METER POCT 361 (H) 70 - 99 mg/dL   Glucose by meter     Status: Abnormal   Result Value Ref Range    GLUCOSE BY METER POCT 337 (H) 70 - 99 mg/dL   Glucose by meter     Status: Abnormal   Result Value Ref Range    GLUCOSE BY METER POCT 191 (H) 70 - 99 mg/dL

## 2021-09-09 NOTE — DISCHARGE INSTRUCTIONS
Diabetes Plan    Check glucose three times per day before meals or morning, midday, and night.  Take Levemir 18 units twice per day when you are not eating regularly.  (once eating more regularly, you could take Levemir 28 units twice per day)  Take Novolog 10 units per full meal  Plus, take Novolog per the scale below:  For Pre-Meal  - 164 give 1 unit.   For Pre-Meal  - 189 give 2 units.   For Pre-Meal  - 214 give 3 units.   For Pre-Meal  - 239 give 4 units.   For Pre-Meal  - 264 give 5 units.   For Pre-Meal  - 289 give 6 units.   For Pre-Meal  - 314 give 7 units.   For Pre-Meal  - 339 give 8 units.   For Pre-Meal BG = or > 340 give 9 units.    BEDTIME-  For  - 224 give 1 units.   For  - 249 give 2 units.   For  - 274 give 3 units.   For  - 299 give 4 units.   For  - 324 give 5 units.   For  - 349 give 6 units.   For BG = or > 350 give 7 units.     Appt with Lenora Haley in Diabetes Clinic 10/19 at 2:30 pm.

## 2021-09-09 NOTE — PROGRESS NOTES
"        OBSERVATION GOALS   -diagnostic tests and consults completed and resulted   -vital signs normal or at patient baseline - Met  -adequate pain control on oral analgesics - Met  -returns to baseline functional status - Not met  -safe disposition plan has been identified -Not met    /73 (BP Location: Left arm)   Pulse 81   Temp 98  F (36.7  C) (Oral)   Resp 20   Ht 1.575 m (5' 2\")   Wt 78 kg (171 lb 15.3 oz)   SpO2 98%   BMI 31.45 kg/m      Nurse to notify provider when observation goals have been met and patient is ready for discharge.  "

## 2021-09-09 NOTE — DISCHARGE SUMMARY
"ED Observation Discharge Summary    Andrew Lockwood   MRN# [id number]  Age: 53 year old   YOB: 1967            Date of Admission: 09/7/2021    Date of Discharge: 09/10/2021  Admitting Provider:  Sharon Casarez, CNP  Discharge Provider: Ceci Bran CNP         DISCHARGE DIAGNOSIS:     Closed fracture of distal ends of right radius and ulna, initial encounter    Hyperglycemia    Homeless    * No resolved hospital problems. *       PHYSICAL EXAM:   Blood pressure 133/82, pulse 74, temperature 98.2  F (36.8  C), temperature source Oral, resp. rate 20, height 1.575 m (5' 2\"), weight 78 kg (171 lb 15.3 oz), SpO2 100 %.     GENERAL: Alert and oriented x 3. NAD.   HEENT: Anicteric sclera. Mucous membranes moist.   CV: RRR. S1, S2. No murmurs appreciated.   RESPIRATORY: Effort normal. Lungs CTAB with no wheezing, rales, rhonchi.   GI: Abdomen soft and non distended with normoactive bowel sounds present in all quadrants. No tenderness, rebound, guarding.   NEUROLOGICAL: No focal deficits. Moves all extremities.    EXTREMITIES: Right arm in cast with sling. Good capillary refill to fingers, fingers are warm and appear well perfused.  SKIN: No jaundice. No rashes.     PROCEDURES AND IMAGING:   Results for orders placed or performed during the hospital encounter of 09/07/21 (from the past 24 hour(s))   Endocrine Diabetes Adult IP Consult: Patient to be seen: Routine within 24 hrs; Call back #: [id number]; uncontrolled diabetes; Consultant may enter orders: Yes; Requesting provider? Attending physician    Radha Daugherty APRN CNS     9/9/2021  3:01 PM    Diabetes/Hyperglycemia Management Consult    Chief Complaint uncontrolled type 2 diabetes  Consult requested by: Ceci SUH  History of Present Illness Andrew Lockwood is a 53 year old male   with a past medical history significant for HIV, CNS   Toxoplasmosis, type 2 diabetes on insulin (had not tolerated oral   or non-insulin injectable " agents), chronic abdominal pain and   esophagitis who presented to the ED with right (his dominant)   hand pain after sustaining a ground-level fall, and found to have   right distal radius/ulna fracture.   He is to be assessed by OT and SW, but expectation is that he   will discharge to his PTA living situation, which is   unstable/homeless.  Pt is known to our service from previous hospital stays.  He has   also been a clinic patient.  During his July admission for small bowel obstruction, he was   changed from glargine to detemir due to abdominal pain reported   w/ glargine.  He says detemir is tolerable to him and happy with   that change.  During that stay his glucose was reasonably   controlled while he was NPO with as little as 15 units basal   insulin.  Renal function was normal.    Today, Pt explains that during Covid he has had a much more   difficult time overall and with his diabetes.  Does not have the   usual indoor public places, and a good friend with whom he used   to stay moved away.  Food access is also much more challenging.    And when there is food, it might be very high carb, like pasta.   He estimates he's lost 20 pounds. He has stored insulin at his   pharmacy or at his friend's house.  Notes that sometimes he won't   eat if he can't get access to his insulin.  He also describes not   being able to tolerate his correction scale unless he's eating.    He can recall using a fixed meal dose in the past, maybe 2 years   ago.  Has not been taking any of his insulin regularly PTA due to   increased pattern of low BGs.  However, without any scheduled   insulin, his glucose was in the 300s.  This morning did not want   to eat d/t reduced appetite-- he attributes this to the opioid   analgesic.  Pt is receptive to guidance on how to adjust insulin   to a safe level for his changing situation.  He feels confident   he can administer insulin even with his right wrist fracture. His   main concern is  related to his lack of housing, lack of support   in securing safe housing.        Recent Labs   Lab 09/09/21  1354 09/09/21  1249 09/09/21  0815 09/09/21  0131 09/08/21  2212 09/08/21  1912   GLC 80 91 191* 337* 361* 295*         Diabetes Type: 2  Diabetes Duration: 7 years  Usual Diabetes Regimen:   Intermittent BG monitoring frequency  Irregular diet r/t food insecurity  Levemir 35 units twice per day, but not taking it routinely.  Novolog high resistance correction  No prandial Novolog  Quite active-- walking much of his days since homeless  (has tried a variety of oral and non-insulin injectables, but did   not tolerate them)  Ability to [place] Prescribed Regimen: fair  Diabetes Control:   Lab Results   Component Value Date    A1C 10.3 08/24/2021    A1C 10.5 07/13/2021    A1C 9.7 11/11/2020    A1C 8.2 09/25/2020    A1C 8.2 08/27/2020    A1C 11.6 02/11/2020    A1C 12.3 01/07/2020     Diabetes Complications: denies retinopathy, denies peripheral   neuropathy, denies nephropathy  Able to Detect Hypoglycemia: yes, has a hard time having ready   access to carbohydrates to raise low BG (insurance doesn't cover   glucose tabs)  Usual Diabetes Care Provider: [facility]   End[facility]inology-- to see Tera in October.    Factors Impacting Glucose Control: lack of stable housing is main   factor      Review of Systems  10 point ROS completed with pertinent positives and negatives   noted in the HPI    Past medical, family and social histories are reviewed and   updated.    Past Medical History  Past Medical History:   Diagnosis Date     AIDS (H)      Allergic rhinitis due to other allergen     DNS     Anal dysplasia      Chronic abdominal pain      CNS toxoplasmosis (H)      Diabetes type 2, controlled (H)      GERD (gastroesophageal reflux disease)      History of seizure      History of substance abuse (H)      HIV (human immunodeficiency virus infection) (H)      HLD (hyperlipidemia)      Lung nodules       Periungual wart      PTSD (post-traumatic stress disorder)      Sleep apnea     doesn't use CPAP       Family History  Family History   Problem Relation Age of Onset     Diabetes Brother      Diabetes Father      Alzheimer Disease Father      Unknown/Adopted Mother      Diabetes Paternal Grandfather      Cancer No family hx of         no skin cancer     Skin Cancer No family hx of         no famiy hx of skin cancer     Glaucoma No family hx of      Macular Degeneration No family hx of        Social History  Social History     Socioeconomic History     Marital status: Single     Spouse name: Not on file     Number of children: Not on file     Years of education: Not on file     Highest education level: Not on file   Occupational History     Occupation:      Comment: 5 years; temp agency     Occupation: Unemployed   Tobacco Use     Smoking status: Current Every Day Smoker     Packs/day: 0.50     Years: 40.00     Pack years: 20.00     Types: Cigarettes     Smokeless tobacco: Former User   Substance and Sexual Activity     Alcohol use: No     Alcohol/week: 0.0 standard drinks     Comment: Last etoh in 2007     Drug use: Not Currently     Types: Marijuana, Methamphetamines     Comment: Sober 9 months      Sexual activity: Not Currently     Partners: Female, Male     Comment: Last sexual activity 2008   Other Topics Concern     Parent/sibling w/ CABG, MI or angioplasty before 65F 55M? Not   Asked   Social History Narrative    Born in [facility].  Came to the [place] in [place].  Last   traveled to visit family in 2008.        1/7/2019 - Homeless. Working with a  at [employer]   trying to get housing. Sexually active with two partners recently   using condoms 100% of the time. Smokes occasionally, not for the   last 4 weeks.        1/7/2020 at [facility] since 1/1/2020. Currently clean in   recovery.  Care established with ID and endocrine     Social Determinants of Health     Financial Resource  Strain:      Difficulty of Paying Living Expenses:    Food Insecurity:      Worried About Running Out of Food in the Last Year:      Ran Out of Food in the Last Year:    Transportation Needs:      Lack of Transportation (Medical):      Lack of Transportation (Non-Medical):    Physical Activity:      Days of Exercise per Week:      Minutes of Exercise per Session:    Stress:      Feeling of Stress :    Social Connections:      Frequency of Communication with Friends and Family:      Frequency of Social Gatherings with Friends and Family:      Attends Zoroastrianism Services:      Active Member of Clubs or Organizations:      Attends Club or Organization Meetings:      Marital Status:    Intimate Partner Violence:      Fear of Current or Ex-Partner:      Emotionally Abused:      Physically Abused:      Sexually Abused:          Physical Exam  Temp: 98.2  F (36.8  C) Temp src: Oral BP: 133/82 Pulse: 74     Resp: 20 SpO2: 100 % O2 Device: None (Room air)    Wt Readings from Last 4 Encounters:   09/08/21 78 kg (171 lb 15.3 oz)   08/24/21 78.4 kg (172 lb 14.4 oz)   08/13/21 81.6 kg (180 lb)   07/13/21 74.8 kg (165 lb)       General:  Restless middle aged man resting in bed  HEENT: NC/AT, PER and anicteric, non-injected, oral mucous   membranes moist.   Lungs: unlabored respiration, no cough  ABD: rounded  Skin:  dry, no obvious lesions  MSK: right arm splinted. Moving it with aid of left hand  Lymp: no LE edema   Mental status:  alert, oriented x3, communicating clearly  Psych:  calm, even mood, appropriate to situation    Laboratory  Recent Labs   Lab Test 09/09/21  1354 09/09/21  1249 09/07/21  1657 08/24/21  1622 08/24/21  1622   NA  --   --  134  --  137   POTASSIUM  --   --  4.0  --  4.1   CHLORIDE  --   --  105  --  105   CO2  --   --  23  --  26   ANIONGAP  --   --  6  --  6   GLC 80 91 427*   < > 399*   BUN  --   --  17  --  13   CR  --   --  0.84  --  0.79   LINDA  --   --  9.1  --  9.2    < > = values in this interval  not displayed.     CBC RESULTS:   Recent Labs   Lab Test 09/07/21  1657   WBC 6.3   RBC 4.63   HGB 13.7   HCT 40.3   MCV 87   MCH 29.6   MCHC 34.0   RDW 12.9          Liver Function Studies -   Recent Labs   Lab Test 09/07/21  1657   PROTTOTAL 7.4   ALBUMIN 3.4   BILITOTAL 0.4   ALKPHOS 169*   AST 17   ALT 23       Active Medications  Current Facility-Administered Medications   Medication     bictegravir-emtricitabine-tenofovir (BIKTARVY) -25 MG per   tablet 1 tablet     glucose gel 15-30 g    Or     dextrose 50 % injection 25-50 mL    Or     glucagon injection 1 mg     ibuprofen (ADVIL/MOTRIN) tablet 800 mg     insulin aspart (NovoLOG) injection (RAPID ACTING)     insulin aspart (NovoLOG) injection (RAPID ACTING)     insulin aspart (NovoLOG) injection (RAPID ACTING)     insulin aspart (NovoLOG) injection (RAPID ACTING)     insulin detemir (LEVEMIR PEN) injection 18 Units     lidocaine (LMX4) cream     lidocaine 1 % 0.1-1 mL     melatonin tablet 1 mg     ondansetron (ZOFRAN-ODT) ODT tab 4 mg    Or     ondansetron (ZOFRAN) injection 4 mg     oxyCODONE (ROXICODONE) tablet 5 mg     pantoprazole (PROTONIX) EC tablet 40 mg     polyethylene glycol (MIRALAX) Packet 17 g     senna-docusate (SENOKOT-S/PERICOLACE) 8.6-50 MG per tablet 1   tablet     sodium chloride (PF) 0.9% PF flush 3 mL     sodium chloride (PF) 0.9% PF flush 3 mL     sodium chloride 0.9% infusion     sodium chloride 0.9% infusion     traMADol (ULTRAM) tablet  mg     No current outpatient medications on file.       Current Diet  Orders Placed This Encounter      Consistent Carb 75 grams CHO per Meal Diet        Assessment  Andrew Lockwood is a 53 year old male with a past medical history   significant for HIV, CNS Toxoplasmosis, type 2 diabetes on   insulin (had not tolerated oral or non-insulin injectable   agents), chronic abdominal pain and esophagitis who presented to   the ED with right (his dominant) hand pain after sustaining a    ground-level fall, and found to have right distal radius/ulna   fracture, experiencing labile glucose.  He would benefit from a reduction in basal insulin at baseline   and a more significant basal reduction when his PO is   particularly low.  He does require regular insulin use and   addition of prandial aspart will aid in keeping basal insulin   doses low enough to prevent problematic hypoglycemia.        Plan  - for inpatient, added aspart 1 unit per 6 grams carbohydrate  - detemir reduced to 18 units BID starting this evening  - aspart high correction to continue AC, HS  - BG qAC, HS 0200  - hypoglycemia protocol  - outpt follow up: Department: Endocrine   Provider:Lenora Haley   Date/Time: 10/19/21 2:30 PM FTF    Pasted into AVS for pt reference:  Diabetes Plan    Check glucose three times per day before meals or morning,   midday, and night.  Take Levemir 18 units twice per day when you are not eating   regularly.  (once eating more regularly, you could take Levemir 28 units   twice per day)  Take Novolog 10 units per full meal  Plus, take Novolog per the scale below:  For Pre-Meal  - 164 give 1 unit.   For Pre-Meal  - 189 give 2 units.   For Pre-Meal  - 214 give 3 units.   For Pre-Meal  - 239 give 4 units.   For Pre-Meal  - 264 give 5 units.   For Pre-Meal  - 289 give 6 units.   For Pre-Meal  - 314 give 7 units.   For Pre-Meal  - 339 give 8 units.   For Pre-Meal BG = or > 340 give 9 units.    BEDTIME-  For  - 224 give 1 units.   For  - 249 give 2 units.   For  - 274 give 3 units.   For  - 299 give 4 units.   For  - 324 give 5 units.   For  - 349 give 6 units.   For BG = or > 350 give 7 units.     Appt with Lenora Haley in Diabetes Clinic 10/19 at 2:30 pm.        Radha SUH CNS     Diabetes Management Team job code: [id number]    I spent a total of 80 minutes bedside and on the inpatient unit   managing the glycemic  care of Andrew Lockwood. Over 50% of my time   on the unit was spent counseling the patient  and/or coordinating   care regarding acute glycemic management and discharge planning.    See note for details.    To contact Endocrine Diabetes service:   From 8AM-4PM: page inpatient diabetes provider that is following   the patient that day (see filed or incomplete progress   notes/consult notes).  If uncertain of provider assignment: page   job code [id number] or review in [facility].  For questions or updates from 4PM-8AM: page the diabetes job code   for on call fellow: [phone]           Glucose by meter   Result Value Ref Range    GLUCOSE BY METER POCT 295 (H) 70 - 99 mg/dL   Glucose by meter   Result Value Ref Range    GLUCOSE BY METER POCT 361 (H) 70 - 99 mg/dL   Glucose by meter   Result Value Ref Range    GLUCOSE BY METER POCT 337 (H) 70 - 99 mg/dL   Glucose by meter   Result Value Ref Range    GLUCOSE BY METER POCT 191 (H) 70 - 99 mg/dL   Glucose by meter   Result Value Ref Range    GLUCOSE BY METER POCT 91 70 - 99 mg/dL   Glucose by meter   Result Value Ref Range    GLUCOSE BY METER POCT 80 70 - 99 mg/dL   Glucose by meter   Result Value Ref Range    GLUCOSE BY METER POCT 225 (H) 70 - 99 mg/dL     *Note: Due to a large number of results and/or encounters for the requested time period, some results have not been displayed. A complete set of results can be found in Results Review.     DISCHARGE MEDICATIONS:   Current Discharge Medication List      START taking these medications    Details   !! Alcohol Swabs PADS Use to swab the area of the injection or todd as directed Per insurance coverage  Qty: 100 each, Refills: 0    Associated Diagnoses: Type 2 diabetes mellitus with hyperglycemia, with long-term current use of insulin (H)      ibuprofen (ADVIL/MOTRIN) 800 MG tablet Take 1 tablet (800 mg) by mouth every 6 hours as needed for moderate pain  Qty: 10 tablet, Refills: 0    Associated Diagnoses: Type 2 diabetes mellitus with  hyperglycemia, with long-term current use of insulin (H)      polyethylene glycol (MIRALAX) 17 GM/Dose powder Take 17 g by mouth daily as needed  Qty: 510 g, Refills: 0    Associated Diagnoses: Closed fracture of distal ends of right radius and ulna with routine healing, subsequent encounter      traMADol (ULTRAM) 50 MG tablet Take 1 tablet (50 mg) by mouth every 6 hours as needed for moderate pain  Qty: 10 tablet, Refills: 0    Associated Diagnoses: Closed fracture of distal ends of right radius and ulna with routine healing, subsequent encounter       !! - Potential duplicate medications found. Please discuss with provider.      CONTINUE these medications which have CHANGED    Details   !! insulin detemir (LEVEMIR PEN) 100 UNIT/ML pen Inject 18 Units Subcutaneous 2 times daily  Qty: 9 mL, Refills: 0    Associated Diagnoses: Type 2 diabetes mellitus with hyperglycemia, with long-term current use of insulin (H)      !! insulin detemir (LEVEMIR PEN) 100 UNIT/ML pen Inject 18 Units Subcutaneous 2 times daily  Qty: 100 mL, Refills: 0    Associated Diagnoses: Type 2 diabetes mellitus with hyperglycemia, with long-term current use of insulin (H)      NOVOLOG FLEXPEN 100 UNIT/ML soln Inject 10 Units Subcutaneous 3 times daily (with meals) Inject 20 units with each meal, along with sliding scale  Qty: 75 mL, Refills: 0    Associated Diagnoses: Type 2 diabetes mellitus with hyperglycemia, with long-term current use of insulin (H)       !! - Potential duplicate medications found. Please discuss with provider.      CONTINUE these medications which have NOT CHANGED    Details   bictegravir-emtricitabine-tenofovir (BIKTARVY) -25 MG per tablet Take 1 tablet by mouth daily  Qty: 30 tablet, Refills: 6    Associated Diagnoses: Human immunodeficiency virus (HIV) disease (H)      pantoprazole (PROTONIX) 40 MG EC tablet Take 1 tablet (40 mg) by mouth daily  Qty: 30 tablet, Refills: 6    Associated Diagnoses: Gastroesophageal  reflux disease with esophagitis without hemorrhage      !! alcohol swab prep pads Use to swab area of injection/todd as directed.  Qty: 100 each, Refills: 3    Associated Diagnoses: Type 2 diabetes mellitus with hyperglycemia, with long-term current use of insulin (H)      !! blood glucose (NO BRAND SPECIFIED) lancets standard To use to test glucose level in the blood. Use to test blood sugar  4  times daily as directed. To accompany glucose monitor brands per insurance coverage.  Qty: 100 each, Refills: 0    Associated Diagnoses: Type 2 diabetes mellitus with hyperglycemia, with long-term current use of insulin (H)      !! blood glucose (NO BRAND SPECIFIED) lancets standard Use to test blood sugar four times daily or as directed.  Qty: 100 each, Refills: 0    Associated Diagnoses: Type 2 diabetes mellitus without complication, without long-term current use of insulin (H)      !! blood glucose (NO BRAND SPECIFIED) test strip To use to test glucose level in the blood. Use to test blood sugar 4 times daily as directed. To accompany glucose monitor brands per insurance coverage.  Qty: 200 strip, Refills: 0    Associated Diagnoses: Type 2 diabetes mellitus with hyperglycemia, with long-term current use of insulin (H)      !! blood glucose (NO BRAND SPECIFIED) test strip 1 strip by In Vitro route 4 times daily (before meals and nightly) Use to test blood sugars 4 times daily or as directed  Qty: 100 strip, Refills: 11    Associated Diagnoses: Type 2 diabetes mellitus with complication, without long-term current use of insulin (H)      !! blood glucose (NO BRAND SPECIFIED) test strip Use to test blood sugar 4 times daily or as directed. To accompany:whatever is covered  Qty: 360 strip, Refills: 3    Associated Diagnoses: Type 2 diabetes mellitus with other specified complication, with long-term current use of insulin (H)      !! blood glucose (NO BRAND SPECIFIED) test strip Use to test blood sugar 4-6 times daily or as  directed. To accompany: Blood Glucose Monitor Brands: per insurance.  Qty: 100 strip, Refills: 6    Associated Diagnoses: Type 2 diabetes mellitus with hyperglycemia, with long-term current use of insulin (H)      !! blood glucose calibration (NO BRAND SPECIFIED) solution Used to calibrate the blood glucose monitor as needed and as directed.  To accompany  blood glucose brands per insurance coverage  Qty: 1 each, Refills: 0    Associated Diagnoses: Type 2 diabetes mellitus with hyperglycemia, with long-term current use of insulin (H)      !! blood glucose calibration (NO BRAND SPECIFIED) solution To accompany: whatever is covered  Qty: 1 each, Refills: 0    Associated Diagnoses: Type 2 diabetes mellitus with other specified complication, with long-term current use of insulin (H)      !! blood glucose calibration (NO BRAND SPECIFIED) solution To accompany: Blood Glucose Monitor Brands: per insurance.  Qty: 1 each, Refills: 0    Associated Diagnoses: Type 2 diabetes mellitus with hyperglycemia, with long-term current use of insulin (H)      !! blood glucose monitoring (NO BRAND SPECIFIED) meter device kit Use as directed. Per insurance coverage.  Qty: 1 kit, Refills: 0    Associated Diagnoses: Type 2 diabetes mellitus with hyperglycemia, with long-term current use of insulin (H)      !! blood glucose monitoring (NO BRAND SPECIFIED) meter device kit Use to test blood sugar 4 times daily or as directed.  Qty: 1 kit, Refills: 0    Associated Diagnoses: Type 2 diabetes mellitus with complication, without long-term current use of insulin (H)      !! blood glucose monitoring (NO BRAND SPECIFIED) meter device kit Use to test blood sugar 4 times daily or as directed. Preferred blood glucose meter OR supplies to accompany: whatever is covered  Qty: 1 kit, Refills: 0    Associated Diagnoses: Type 2 diabetes mellitus with other specified complication, with long-term current use of insulin (H)      !! blood glucose monitoring (NO  BRAND SPECIFIED) meter device kit Use to test blood sugar 4-6 times daily or as directed. Preferred blood glucose meter OR supplies to accompany: Blood Glucose Monitor Brands: per insurance.  Qty: 1 kit, Refills: 0    Associated Diagnoses: Type 2 diabetes mellitus with hyperglycemia, with long-term current use of insulin (H)      glucose 40 % (400 mg/mL) gel 15 g every 15 minutes by mouth as needed for low blood sugar. Oral gel is preferable for conscious and able to swallow patient.  Qty: 112.5 g, Refills: 0    Associated Diagnoses: Type 2 diabetes mellitus with hyperglycemia, with long-term current use of insulin (H)      !! insulin pen needle (32G X 4 MM) 32G X 4 MM miscellaneous Use as directed by provider. Per insurance coverage  Qty: 100 each, Refills: 0    Associated Diagnoses: Type 2 diabetes mellitus with hyperglycemia, with long-term current use of insulin (H)      !! insulin pen needle (B-D U/F) 31G X 8 MM miscellaneous Use 1 pen needles daily or as directed.  Qty: 100 each, Refills: 3    Associated Diagnoses: Type 2 diabetes mellitus without complication, without long-term current use of insulin (H)      !! insulin pen needle (BD SCOTT U/F) 32G X 4 MM miscellaneous USE 3 TO 4 NEEDLES DAILY  Qty: 400 each, Refills: 3    Associated Diagnoses: Type 2 diabetes mellitus with hyperglycemia, without long-term current use of insulin (H)      !! Lancets (ONETOUCH DELICA PLUS [id number]) MISC USE TO TEST BLOOD SUGAR FOUR TIMES DAILY OR AS DIRECTED  Qty: 100 each, Refills: 11    Associated Diagnoses: Type 2 diabetes mellitus without complication, without long-term current use of insulin (H)      SENNA-docusate sodium (SENNA S) 8.6-50 MG tablet Take 1 tablet by mouth 2 times daily Hold for loose stools.  Qty: 60 tablet, Refills: 5    Associated Diagnoses: Constipation, unspecified constipation type      Sharps Container MISC Use as directed to dispose of needles, lancets and other sharps. Per Insurance coverage  Qty:  "1 each, Refills: 0    Associated Diagnoses: Type 2 diabetes mellitus with hyperglycemia, with long-term current use of insulin (H)      STATIN NOT PRESCRIBED (INTENTIONAL) Please choose reason not prescribed, below    Associated Diagnoses: Type 2 diabetes mellitus with complication, without long-term current use of insulin (H)      !! thin (NO BRAND SPECIFIED) lancets Use with lanceting device. To accompany: Test 4 times daily with whatever is covered  Qty: 360 each, Refills: 3    Associated Diagnoses: Type 2 diabetes mellitus with other specified complication, with long-term current use of insulin (H)      !! thin (NO BRAND SPECIFIED) lancets Use with lanceting device. To accompany: Blood Glucose Monitor [employer]: per insurance.  Qty: 100 each, Refills: 6    Associated Diagnoses: Type 2 diabetes mellitus with hyperglycemia, with long-term current use of insulin (H)       !! - Potential duplicate medications found. Please discuss with provider.      STOP taking these medications       famotidine (PEPCID) 20 MG tablet Comments:   Reason for Stopping:                 CONSULTATIONS:   Consultation during this admission received from:  OT    BRIEF HISTORY OF PRESENT ILLNESS:   (Adopted from admission H&P).    Per ED, \"Andrew Lockwood is a 53 year old male with a PMH notable for HIV/AIDS, diabetes type 2, GERD, seizures, substance abuse, and hyperlipidemia who presents to the ED via EMS for an evaluation of a fall. Patient reports that he was crossing the train tracks and tripped and landed on his right wrist. He states that the pain is localized in his right wrist. Denies any arm or shoulder pain. He states that he has titanium in his right arm from a previous injury. Patient reports allergies to penicillin and metformin. Patient notes that he is homeless but is currently staying with a friend.\"    ED OBSERVATION COURSE: Andrew Lockwood is a 53 year old male with a past medical history significant for HIV, CNS Toxoplasmosis, " T2DM on insulin, chronic abdominal pain and grade A esophagitis who presented to the ED with right hand pain after sustaining a ground-level fall. Found to have right distal radius/ulna fracture.      ##Right distal radius/ulna fractures:  #Homelessness:   53yr old, right-hand dominant male who sustained right distal radius/ulna fractures following a mechanical, ground-level fall. Given that he is right hand dominant and currently homeless, he was admitted to observation for symptom management and safe disposition.  Pt has stable VSS. XR RIGHT FOREARM shows: Comminuted mildly displaced fractures of the distal radial and ulnar metaphyses. No evidence of additional fractures. Fractures were reduced in the ED nd post XR shows improvement in alignment. Orthopedics evaluated pt in the ED and recommended to use of SCDs, mobilize digits for edema mgmt, non-weight bearing right UE, followup in Ortho Clinic in 1-2 weeks for repeat XR. Patient reports he prefers tramadol for pain management. As above he is currently homeless. Community case workers and [facility]ena has been working with him to establish stable housing. SW was consulted to assist with shelters. OT was consulted and cleared for discharge. He was instructed to follow-up with orthopedics in 1 week for repeat x-rays. He was instructed to be non-weight bearing to right arm. Will discharge with Tramadol PRN pain and  Ibuprofen 800 mg q 6 hours prn. He was given strict return instructions.        ##T2DM: Last HgbA1c: 10.3 on 8/24/21. During admission in July 2021, pt had abdominal pain with glargine, was switched to levemir and sent home with glucometer. High dose sliding scale coverage utilized with meals. Pt has not followed up with endocrine. BG elevated here.   Endocrinology was consulted and recommended   Check glucose three times per day before meals or morning, midday, and night.  Take Levemir 18 units twice per day when he is  are not eating  regularly.  (once eating more regularly, you could take Levemir 28 units twice per day)  Take Novolog 10 units per full meal  Plus, take Novolog per the scale below:  For Pre-Meal  - 164 give 1 unit.   For Pre-Meal  - 189 give 2 units.   For Pre-Meal  - 214 give 3 units.   For Pre-Meal  - 239 give 4 units.   For Pre-Meal  - 264 give 5 units.   For Pre-Meal  - 289 give 6 units.   For Pre-Meal  - 314 give 7 units.   For Pre-Meal  - 339 give 8 units.   For Pre-Meal BG = or > 340 give 9 units.    BEDTIME-  For  - 224 give 1 units.   For  - 249 give 2 units.   For  - 274 give 3 units.   For  - 299 give 4 units.   For  - 324 give 5 units.   For  - 349 give 6 units.   For BG = or > 350 give 7 units.      Appt with Lenora Haley in Diabetes Clinic 10/19 at 2:30 pm.        ##HIV:   - Continue PTA Biktarvy     ##CNS Toxoplasmosis: diagnosed at time of HIV diagnosis. 2 rim enhancing lesions in the cerebellum/pedicle (biopsy). Received clindamycin and pyrimethamine. Eventually transitioned to bactrim for secondary toxo prophy and PJP prophy. Tmp/smx stopped 11/2019.  Historically he has had memory issues after this diagnosis.  - Monitor out-patient      ##Chronic abdominal pain and Grade A esophagitis: history of mild small bowel obstruction 3/7/20, 11/11/20 and 7/13/21. Recurrent ED presentations.Most recent 8/23/21 CT A/P showed a distended stomach w/o outlet obstruction and prostate enlargement. Last colonoscopy 2016-normal appearing colon. Small bowel enteroscopy showed grade A reflux esophagitis.  - Continue PTA pantoprazole      DISCHARGE DISPOSITION:   Discharged to home. The patient was discharged in a stable condition and agreed to discharge plan.    DISCHARGE INSTRUCTIONS AND FOLLOW-UP:  Discharge Procedure Orders   Medication Therapy Management Referral   Referral Priority: Routine Referral Type: Med Therapy Management   Requested  Specialty: Pharmacist   Number of Visits Requested: 1     Orthopedic  Referral   Standing Status: Future   Referral Priority: Routine Referral Type: Consultation   Number of Visits Requested: 1     Reason for your hospital stay   Order Comments: You were admitted to observation due to right distal radium/ulnar fracture. OT was consulted and gave you some help as to how to manage with your fracture. Orthopedics was consulted and recommended you do not put any weight on your arm and follow-up in 1 week.     Your blood glucose was high. Endocrinology recommendedL     Check glucose three times per day before meals or morning, midday, and night.  Take Levemir 18 units twice per day when you are not eating regularly.  (once eating more regularly, you could take Levemir 28 units twice per day)  Take Novolog 10 units per full meal  Plus, take Novolog per the scale below:  For Pre-Meal  - 164 give 1 unit.   For Pre-Meal  - 189 give 2 units.   For Pre-Meal  - 214 give 3 units.   For Pre-Meal  - 239 give 4 units.   For Pre-Meal  - 264 give 5 units.   For Pre-Meal  - 289 give 6 units.   For Pre-Meal  - 314 give 7 units.   For Pre-Meal  - 339 give 8 units.   For Pre-Meal BG = or > 340 give 9 units.    BEDTIME-  For  - 224 give 1 units.   For  - 249 give 2 units.   For  - 274 give 3 units.   For  - 299 give 4 units.   For  - 324 give 5 units.   For  - 349 give 6 units.   For BG = or > 350 give 7 units.     Activity   Order Comments: Your activity upon discharge: activity as tolerated, do not put weight on your right arm.     Order Specific Question Answer Comments   Is discharge order? Yes      When to contact your care team   Order Comments: Return to the ED with fever, uncontrolled nausea, vomiting, unrelieved pain, bleeding not relieved with pressure, dizziness, chest pain, shortness of breath, loss of consciousness, and any new or  concerning symptoms.     Wound care and dressings   Order Comments: Instructions to care for your wound at home: as directed.     Monitor and record   Order Comments: blood glucose 4 times a day, before meals and at bedtime     Adult [facility] Follow-up and recommended labs and tests   Order Comments: Follow up with primary care provider, Physician No Ref-Primary, within 7 days for hospital follow- up.    Orthopedics in 1 week     Endocrinology as previously scheduled       Appointments on [place] and/or [facility] (with [facility] provider or service). Call [phone] if you haven't heard regarding these appointments within 7 days of discharge.     Diet   Order Comments: Follow this diet upon discharge: Orders Placed This Encounter      Consistent Carb 75 grams CHO per Meal Diet  Be sure to drink plenty of non-caffeinated beverages.     Order Specific Question Answer Comments   Is discharge order? Yes       Attestation:   I have reviewed today's vital signs, notes, medications, labs and imaging.      VIKASH Alvarez, CNP  Emergency Department Observation Unit

## 2021-09-09 NOTE — PLAN OF CARE
-diagnostic tests and consults completed and resulted. No  -vital signs normal or at patient baseline. Yes  -adequate pain control on oral analgesics. Yes  -returns to baseline functional status. no  -safe disposition plan has been identified. No  The right wrist is wrapped, patient reported pain this am, received ultram. Last bg 80, patient refused breakfast earlier.

## 2021-09-09 NOTE — PLAN OF CARE
- Diagnostic tests and consults completed and resulted: Not met   - Vital signs normal or at patient baseline: Yes  - Adequate pain control on oral analgesics: Yes  - Returns to baseline functional status: Not met  - Safe disposition plan has been identified: Not met

## 2021-09-09 NOTE — PLAN OF CARE
Patient upset regarding his current insulin orders, updated NP. Patient states that he is leaving now.  aware.

## 2021-09-09 NOTE — PLAN OF CARE
-diagnostic tests and consults completed and resulted. No  -vital signs normal or at patient baseline. Yes  -adequate pain control on oral analgesics. Yes  -returns to baseline functional status. no  -safe disposition plan has been identified. No  The right wrist is wrapped, patient reported pain this am, received ultram

## 2021-09-09 NOTE — CONSULTS
"Care Management Follow Up    Length of Stay (days): 0    Expected Discharge Date:  9/10/2021     Concerns to be Addressed:   Shelter placement    Patient plan of care discussed at interdisciplinary rounds: Yes    Anticipated Discharge Disposition:  [facility]      Referrals Placed by CM/DAYNA:  None at this time  Private pay costs discussed: Not applicable    Additional Information:    1530 DAYNA was paged by ED/Obs CNP Fina who told DAYNA that pt wanted to leave. CNP asked DAYNA to find a shelter bed for him as he is homeless.    SW went to his room and noticed he appeared agitated and decided to wait a couple of minutes before approaching him. When SW entered his room, SW introduced self and asked if they could chat for a moment. He agreed. He said that he was supposed to get his insulin and he hadn't and that he was supposed to get 2 pain pills and they only gave him one. He said that he was going to leave because they weren't taking care of him and he could take care of himself.    SW acknowledged that he was having a rough time and that he wanted to leave. SW asked if he wanted help finding a shelter bed for the night. He said \"okay, but I only want [facility]'s\". SW agreed to try and get a bed for him for tonight.    [id number] DAYNA made several calls [facility] - [phone] and was able to obtain a bed at [facility] as there were no beds available at [facility]. [facility] advocate told DAYNA that Andrew could check in between the present and 2100.    1615 DAYNA updated Andrew that he had a bed at [facility]. He informed DAYNA that he'd decided to follow medical advice and stay one more night at [facility]. DAYNA confirmed this with CNP and then [place] [facility] [place] bed.    DAYNA will ask colleague DAYNA Velazquez to contact [facility] in the morning to reserve a shelter bed at [facility] for pt.    DAYNA will continue to follow as needed.    Meron Amador, RADHA, [facility]  ED/OBS   [place] " [place]  Phone: [phone]  Pager: [phone]  Fax: [phone]     On-call pager, [phone], 4:00 pm to midnight          RADHA SANDOVAL

## 2021-09-09 NOTE — CONSULTS
Diabetes/Hyperglycemia Management Consult    Chief Complaint uncontrolled type 2 diabetes  Consult requested by: Ceci SUH  History of Present Illness Andrew Lockwood is a 53 year old male with a past medical history significant for HIV, CNS Toxoplasmosis, type 2 diabetes on insulin (had not tolerated oral or non-insulin injectable agents), chronic abdominal pain and esophagitis who presented to the ED with right (his dominant) hand pain after sustaining a ground-level fall, and found to have right distal radius/ulna fracture.   He is to be assessed by OT and SW, but expectation is that he will discharge to his PTA living situation, which is unstable/homeless.  Pt is known to our service from previous hospital stays.  He has also been a clinic patient.  During his July admission for small bowel obstruction, he was changed from glargine to detemir due to abdominal pain reported w/ glargine.  He says detemir is tolerable to him and happy with that change.  During that stay his glucose was reasonably controlled while he was NPO with as little as 15 units basal insulin.  Renal function was normal.    Today, Pt explains that during Covid he has had a much more difficult time overall and with his diabetes.  Does not have the usual indoor public places, and a good friend with whom he used to stay moved away.  Food access is also much more challenging.  And when there is food, it might be very high carb, like pasta. He estimates he's lost 20 pounds. He has stored insulin at his pharmacy or at his friend's house.  Notes that sometimes he won't eat if he can't get access to his insulin.  He also describes not being able to tolerate his correction scale unless he's eating.  He can recall using a fixed meal dose in the past, maybe 2 years ago.  Has not been taking any of his insulin regularly PTA due to increased pattern of low BGs.  However, without any scheduled insulin, his glucose was in the 300s.  This morning did  not want to eat d/t reduced appetite-- he attributes this to the opioid analgesic.  Pt is receptive to guidance on how to adjust insulin to a safe level for his changing situation.  He feels confident he can administer insulin even with his right wrist fracture. His main concern is related to his lack of housing, lack of support in securing safe housing.        Recent Labs   Lab 09/09/21  1354 09/09/21  1249 09/09/21  0815 09/09/21  0131 09/08/21  2212 09/08/21  1912   GLC 80 91 191* 337* 361* 295*         Diabetes Type: 2  Diabetes Duration: 7 years  Usual Diabetes Regimen:   Intermittent BG monitoring frequency  Irregular diet r/t food insecurity  Levemir 35 units twice per day, but not taking it routinely.  Novolog high resistance correction  No prandial Novolog  Quite active-- walking much of his days since homeless  (has tried a variety of oral and non-insulin injectables, but did not tolerate them)  Ability to [place] Prescribed Regimen: fair  Diabetes Control:   Lab Results   Component Value Date    A1C 10.3 08/24/2021    A1C 10.5 07/13/2021    A1C 9.7 11/11/2020    A1C 8.2 09/25/2020    A1C 8.2 08/27/2020    A1C 11.6 02/11/2020    A1C 12.3 01/07/2020     Diabetes Complications: denies retinopathy, denies peripheral neuropathy, denies nephropathy  Able to Detect Hypoglycemia: yes, has a hard time having ready access to carbohydrates to raise low BG (insurance doesn't cover glucose tabs)  Usual Diabetes Care Provider: [facility]-- to see Tera in October.    Factors Impacting Glucose Control: lack of stable housing is main factor      Review of Systems  10 point ROS completed with pertinent positives and negatives noted in the HPI    Past medical, family and social histories are reviewed and updated.    Past Medical History  Past Medical History:   Diagnosis Date     AIDS (H)      Allergic rhinitis due to other allergen     DNS     Anal dysplasia      Chronic abdominal pain      CNS  toxoplasmosis (H)      Diabetes type 2, controlled (H)      GERD (gastroesophageal reflux disease)      History of seizure      History of substance abuse (H)      HIV (human immunodeficiency virus infection) (H)      HLD (hyperlipidemia)      Lung nodules      Periungual wart      PTSD (post-traumatic stress disorder)      Sleep apnea     doesn't use CPAP       Family History  Family History   Problem Relation Age of Onset     Diabetes Brother      Diabetes Father      Alzheimer Disease Father      Unknown/Adopted Mother      Diabetes Paternal Grandfather      Cancer No family hx of         no skin cancer     Skin Cancer No family hx of         no famiy hx of skin cancer     Glaucoma No family hx of      Macular Degeneration No family hx of        Social History  Social History     Socioeconomic History     Marital status: Single     Spouse name: Not on file     Number of children: Not on file     Years of education: Not on file     Highest education level: Not on file   Occupational History     Occupation:      Comment: 5 years; temp agency     Occupation: Unemployed   Tobacco Use     Smoking status: Current Every Day Smoker     Packs/day: 0.50     Years: 40.00     Pack years: 20.00     Types: Cigarettes     Smokeless tobacco: Former User   Substance and Sexual Activity     Alcohol use: No     Alcohol/week: 0.0 standard drinks     Comment: Last etoh in 2007     Drug use: Not Currently     Types: Marijuana, Methamphetamines     Comment: Sober 9 months      Sexual activity: Not Currently     Partners: Female, Male     Comment: Last sexual activity 2008   Other Topics Concern     Parent/sibling w/ CABG, MI or angioplasty before 65F 55M? Not Asked   Social History Narrative    Born in [facility].  [place]ame to the [place] in [place].  Last traveled to visit family in 2008.        1/7/2019 - Homeless. Working with a  at [employer]  trying to get housing. Sexually active with two partners recently using  condoms 100% of the time. Smokes occasionally, not for the last 4 weeks.        1/7/2020 at [facility] since 1/1/2020. 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  Care established with ID and endocrine     Social Determinants of Health     Financial Resource Strain:      Difficulty of Paying Living Expenses:    Food Insecurity:      Worried About Running Out of Food in the Last Year:      Ran Out of Food in the Last Year:    Transportation Needs:      Lack of Transportation (Medical):      Lack of Transportation (Non-Medical):    Physical Activity:      Days of Exercise per Week:      Minutes of Exercise per Session:    Stress:      Feeling of Stress :    Social Connections:      Frequency of Communication with Friends and Family:      Frequency of Social Gatherings with Friends and Family:      Attends Protestant Services:      Active Member of Clubs or Organizations:      Attends Club or Organization Meetings:      Marital Status:    Intimate Partner Violence:      Fear of Current or Ex-Partner:      Emotionally Abused:      Physically Abused:      Sexually Abused:          Physical Exam  Temp: 98.2  F (36.8  C) Temp src: Oral BP: 133/82 Pulse: 74   Resp: 20 SpO2: 100 % O2 Device: None (Room air)    Wt Readings from Last 4 Encounters:   09/08/21 78 kg (171 lb 15.3 oz)   08/24/21 78.4 kg (172 lb 14.4 oz)   08/13/21 81.6 kg (180 lb)   07/13/21 74.8 kg (165 lb)       General:  Restless middle aged man resting in bed  HEENT: NC/AT, PER and anicteric, non-injected, oral mucous membranes moist.   Lungs: unlabored respiration, no cough  ABD: rounded  Skin:  dry, no obvious lesions  MSK: right arm splinted. Moving it with aid of left hand  Lymp: no LE edema   Mental status:  alert, oriented x3, communicating clearly  Psych:  calm, even mood, appropriate to situation    Laboratory  Recent Labs   Lab Test 09/09/21  1354 09/09/21  1249 09/07/21  1657 08/24/21  1622 08/24/21  1622   NA  --   --  134  --  137   POTASSIUM  --   --  4.0  --   4.1   CHLORIDE  --   --  105  --  105   CO2  --   --  23  --  26   ANIONGAP  --   --  6  --  6   GLC 80 91 427*   < > 399*   BUN  --   --  17  --  13   CR  --   --  0.84  --  0.79   LINDA  --   --  9.1  --  9.2    < > = values in this interval not displayed.     CBC RESULTS:   Recent Labs   Lab Test 09/07/21  1657   WBC 6.3   RBC 4.63   HGB 13.7   HCT 40.3   MCV 87   MCH 29.6   MCHC 34.0   RDW 12.9          Liver Function Studies -   Recent Labs   Lab Test 09/07/21  1657   PROTTOTAL 7.4   ALBUMIN 3.4   BILITOTAL 0.4   ALKPHOS 169*   AST 17   ALT 23       Active Medications  Current Facility-Administered Medications   Medication     bictegravir-emtricitabine-tenofovir (BIKTARVY) -25 MG per tablet 1 tablet     glucose gel 15-30 g    Or     dextrose 50 % injection 25-50 mL    Or     glucagon injection 1 mg     ibuprofen (ADVIL/MOTRIN) tablet 800 mg     insulin aspart (NovoLOG) injection (RAPID ACTING)     insulin aspart (NovoLOG) injection (RAPID ACTING)     insulin aspart (NovoLOG) injection (RAPID ACTING)     insulin aspart (NovoLOG) injection (RAPID ACTING)     insulin detemir (LEVEMIR PEN) injection 18 Units     lidocaine (LMX4) cream     lidocaine 1 % 0.1-1 mL     melatonin tablet 1 mg     ondansetron (ZOFRAN-ODT) ODT tab 4 mg    Or     ondansetron (ZOFRAN) injection 4 mg     oxyCODONE (ROXICODONE) tablet 5 mg     pantoprazole (PROTONIX) EC tablet 40 mg     polyethylene glycol (MIRALAX) Packet 17 g     senna-docusate (SENOKOT-S/PERICOLACE) 8.6-50 MG per tablet 1 tablet     sodium chloride (PF) 0.9% PF flush 3 mL     sodium chloride (PF) 0.9% PF flush 3 mL     sodium chloride 0.9% infusion     sodium chloride 0.9% infusion     traMADol (ULTRAM) tablet  mg     No current outpatient medications on file.       Current Diet  Orders Placed This Encounter      Consistent Carb 75 grams CHO per Meal Diet        Assessment  Andrew Lockwood is a 53 year old male with a past medical history significant for HIV,  CNS Toxoplasmosis, type 2 diabetes on insulin (had not tolerated oral or non-insulin injectable agents), chronic abdominal pain and esophagitis who presented to the ED with right (his dominant) hand pain after sustaining a ground-level fall, and found to have right distal radius/ulna fracture, experiencing labile glucose.  He would benefit from a reduction in basal insulin at baseline and a more significant basal reduction when his PO is particularly low.  He does require regular insulin use and addition of prandial aspart will aid in keeping basal insulin doses low enough to prevent problematic hypoglycemia.        Plan  - for inpatient, added aspart 1 unit per 6 grams carbohydrate  - detemir reduced to 18 units BID starting this evening  - aspart high correction to continue AC, HS  - BG qAC, HS 0200  - hypoglycemia protocol  - outpt follow up: Department: Endocrine   Provider:Lenora Haley   Date/Time: 10/19/21 2:30 PM FTF    Pasted into AVS for pt reference:  Diabetes Plan    Check glucose three times per day before meals or morning, midday, and night.  Take Levemir 18 units twice per day when you are not eating regularly.  (once eating more regularly, you could take Levemir 28 units twice per day)  Take Novolog 10 units per full meal  Plus, take Novolog per the scale below:  For Pre-Meal  - 164 give 1 unit.   For Pre-Meal  - 189 give 2 units.   For Pre-Meal  - 214 give 3 units.   For Pre-Meal  - 239 give 4 units.   For Pre-Meal  - 264 give 5 units.   For Pre-Meal  - 289 give 6 units.   For Pre-Meal  - 314 give 7 units.   For Pre-Meal  - 339 give 8 units.   For Pre-Meal BG = or > 340 give 9 units.    BEDTIME-  For  - 224 give 1 units.   For  - 249 give 2 units.   For  - 274 give 3 units.   For  - 299 give 4 units.   For  - 324 give 5 units.   For  - 349 give 6 units.   For BG = or > 350 give 7 units.     Appt with Lenora Haley in  Diabetes Clinic 10/19 at 2:30 pm.        Radha SUH CNS     Diabetes Management Team job code: [phone]    I spent a total of 80 minutes bedside and on the inpatient unit managing the glycemic care of Andrew Lockwood. Over 50% of my time on the unit was spent counseling the patient  and/or coordinating care regarding acute glycemic management and discharge planning.  See note for details.    To contact Endocrine Diabetes service:   From 8AM-4PM: page inpatient diabetes provider that is following the patient that day (see filed or incomplete progress notes/consult notes).  If uncertain of provider assignment: page job code [phone] or review in [facility].  For questions or updates from 4PM-8AM: page the diabetes job code for on call fellow: [phone]

## 2021-09-10 VITALS
DIASTOLIC BLOOD PRESSURE: 87 MMHG | WEIGHT: 171.96 LBS | HEIGHT: 62 IN | HEART RATE: 84 BPM | SYSTOLIC BLOOD PRESSURE: 124 MMHG | BODY MASS INDEX: 31.64 KG/M2 | TEMPERATURE: 98.1 F | RESPIRATION RATE: 17 BRPM | OXYGEN SATURATION: 98 %

## 2021-09-10 LAB
ERYTHROCYTE [DISTWIDTH] IN BLOOD BY AUTOMATED COUNT: 12.7 % (ref 10–15)
GLUCOSE BLDC GLUCOMTR-MCNC: 199 MG/DL (ref 70–99)
GLUCOSE BLDC GLUCOMTR-MCNC: 240 MG/DL (ref 70–99)
HCT VFR BLD AUTO: 38.3 % (ref 40–53)
HGB BLD-MCNC: 12.9 G/DL (ref 13.3–17.7)
MCH RBC QN AUTO: 28.7 PG (ref 26.5–33)
MCHC RBC AUTO-ENTMCNC: 33.7 G/DL (ref 31.5–36.5)
MCV RBC AUTO: 85 FL (ref 78–100)
PLATELET # BLD AUTO: 328 10E3/UL (ref 150–450)
RBC # BLD AUTO: 4.5 10E6/UL (ref 4.4–5.9)
TROPONIN I SERPL-MCNC: <0.015 UG/L (ref 0–0.04)
WBC # BLD AUTO: 6.7 10E3/UL (ref 4–11)

## 2021-09-10 PROCEDURE — 250N000013 HC RX MED GY IP 250 OP 250 PS 637: Performed by: NURSE PRACTITIONER

## 2021-09-10 PROCEDURE — 93005 ELECTROCARDIOGRAM TRACING: CPT

## 2021-09-10 PROCEDURE — 36415 COLL VENOUS BLD VENIPUNCTURE: CPT | Performed by: NURSE PRACTITIONER

## 2021-09-10 PROCEDURE — G0378 HOSPITAL OBSERVATION PER HR: HCPCS

## 2021-09-10 PROCEDURE — 99217 PR OBSERVATION CARE DISCHARGE: CPT | Performed by: EMERGENCY MEDICINE

## 2021-09-10 PROCEDURE — 84484 ASSAY OF TROPONIN QUANT: CPT | Performed by: NURSE PRACTITIONER

## 2021-09-10 PROCEDURE — 85027 COMPLETE CBC AUTOMATED: CPT | Performed by: NURSE PRACTITIONER

## 2021-09-10 PROCEDURE — 96372 THER/PROPH/DIAG INJ SC/IM: CPT

## 2021-09-10 RX ORDER — AMOXICILLIN 250 MG
2 CAPSULE ORAL 2 TIMES DAILY
Status: DISCONTINUED | OUTPATIENT
Start: 2021-09-10 | End: 2021-09-10 | Stop reason: HOSPADM

## 2021-09-10 RX ADMIN — TRAMADOL HYDROCHLORIDE 100 MG: 50 TABLET, FILM COATED ORAL at 06:37

## 2021-09-10 RX ADMIN — PANTOPRAZOLE SODIUM 40 MG: 40 TABLET, DELAYED RELEASE ORAL at 09:13

## 2021-09-10 RX ADMIN — POLYETHYLENE GLYCOL 3350 17 G: 17 POWDER, FOR SOLUTION ORAL at 09:07

## 2021-09-10 RX ADMIN — BICTEGRAVIR SODIUM, EMTRICITABINE, AND TENOFOVIR ALAFENAMIDE FUMARATE 1 TABLET: 50; 200; 25 TABLET ORAL at 09:13

## 2021-09-10 RX ADMIN — IBUPROFEN 800 MG: 600 TABLET ORAL at 09:21

## 2021-09-10 NOTE — PLAN OF CARE
"Observation Goals:    -diagnostic tests and consults completed and resulted: not met  -vital signs normal or at patient baseline: in progress, hypertensive  -adequate pain control on oral analgesics: in progress   -returns to baseline functional status: not met  -safe disposition plan has been identified: not met, SW following for safe housing placement    BP (!) 142/91 (BP Location: Left arm)   Pulse 82   Temp 98.5  F (36.9  C) (Oral)   Resp 18   Ht 1.575 m (5' 2\")   Wt 78 kg (171 lb 15.3 oz)   SpO2 100%   BMI 31.45 kg/m      "

## 2021-09-10 NOTE — PROGRESS NOTES
Care Management Discharge Note    Discharge Date:  9/10  Expected Time of Departure: TBD    Discharge Disposition:  Shelter vs Street    Discharge Services:  ASC Resources Provided    Private pay costs discussed: Not applicable    Education Provided on the Discharge Plan: Yes   Persons Notified of Discharge Plans: Pt, RN, NP  Patient/Family in Agreement with the Plan:      Handoff Referral Completed: No    Additional Information:  Chart reviewed. Case discussed with bedside RN and medical provider.    Writer met with pt and introduced self. Writer provided pt with the number for the ASC, however, pt said that hospital staff must call to arrange beds. Writer explained that pt must be mistaken because anyone is capable of calling and reserving a bed for themselves. Pt said he would refuse to call.  Writer explained pt was free to make that decision.     Writer was able to contact [facility] at 1130. Pt has been reserved a bed at [facility]. Pt will need to report there between [id number].    Writer updated pt on his acceptance to [facility]. Pt reported he no longer wanted a shelter bed and found someone to stay with.  ________________    RADHA Lehman, [facility]  ED/Observation   [facility]  Phone: [phone]  Pager: [phone]  Fax: [phone]    On-call pager, [phone], 4:00pm to midnight

## 2021-09-10 NOTE — PLAN OF CARE
"Observation Goals:     -diagnostic tests and consults completed and resulted: not met  -vital signs normal or at patient baseline: in progress, hypertensive  -adequate pain control on oral analgesics: in progress, prn tramadol given  -returns to baseline functional status: not met  -safe disposition plan has been identified: not met, SW following for safe housing placement    BP (!) 146/88 (BP Location: Right arm)   Pulse 80   Temp 98.2  F (36.8  C) (Oral)   Resp 17   Ht 1.575 m (5' 2\")   Wt 78 kg (171 lb 15.3 oz)   SpO2 99%   BMI 31.45 kg/m      "

## 2021-09-10 NOTE — PROGRESS NOTES
Physician Attestation   I, Juanis Glover MD, personally saw and evaluated Andrew Lockwood as part of a shared visit.  I have reviewed and discussed with the advanced practice provider their discharge plan.    My key history or physical exam findings from the day of discharge: admitted to ed obs for right distal radius/ulna fracture.  Hx of HIV and DM2.  Feel well today and ready for discharge.  Sugar tongue splint placed in ED.     Key management decisions made by me: seen by ortho,  Tramadol for pain.  Patient is homeless to admission to [facility] shelter arranged by .  Elevated blood sugars as well by here.  Seen by endocrine and recs followed.     Juanis Glover  Date of Service (when I saw the patient): 09/10/21

## 2021-09-10 NOTE — PLAN OF CARE
"Observation Goals:     -diagnostic tests and consults completed and resulted: not met  -vital signs normal or at patient baseline: met, vss  -adequate pain control on oral analgesics: in progress, prn tramadol given  -returns to baseline functional status: not met  -safe disposition plan has been identified: not met, SW following for safe housing placement    /79 (BP Location: Left arm)   Pulse 85   Temp 98.3  F (36.8  C) (Oral)   Resp 17   Ht 1.575 m (5' 2\")   Wt 78 kg (171 lb 15.3 oz)   SpO2 98%   BMI 31.45 kg/m      Pt was reporting LLQ abdominal pain, requested to speak with KASSY. Refused ducolax one time dose.   "

## 2021-09-10 NOTE — PLAN OF CARE
"Observation Goals:     -diagnostic tests and consults completed and resulted: not met  -vital signs normal or at patient baseline: Met  -adequate pain control on oral analgesics: Met  -returns to baseline functional status: not met  -safe disposition plan has been identified: not met, SW following for safe housing placement    /87 (BP Location: Right arm)   Pulse 84   Temp 98.1  F (36.7  C) (Oral)   Resp 17   Ht 1.575 m (5' 2\")   Wt 78 kg (171 lb 15.3 oz)   SpO2 98%   BMI 31.45 kg/m      "

## 2021-09-10 NOTE — PROGRESS NOTES
Discharge instructions reviewed with patient. Medications to be picked up at inpatient pharmacy. IV removed. Belongings packed up and sent with patient. Medications with security returned to patient.

## 2021-09-13 ENCOUNTER — TELEPHONE (OUTPATIENT)
Dept: PHARMACY | Facility: CLINIC | Age: 56
End: 2021-09-13

## 2021-09-13 NOTE — TELEPHONE ENCOUNTER
----- Message from Viinta Mead ATC sent at 9/9/2021 10:35 AM CDT -----  Please try and reach patient to schedule.     AUBREY Talamantes  ----- Message -----  From: Radha Lindo  Sent: 9/8/2021   4:34 PM CDT  To: [facility]    I've called him once attempting to schedule him.   ----- Message -----  From: Renay Israel MD  Sent: 9/7/2021  11:28 PM CDT  To: [facility]    Please schedule follow-up with nonoperative orthopedic provider regarding right distal radius/ulna fractures in 1 to 2 weeks with repeat x-rays right wrist obtained at that time.Thanks!
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
## 2021-09-14 LAB
ATRIAL RATE - MUSE: 79 BPM
DIASTOLIC BLOOD PRESSURE - MUSE: NORMAL MMHG
INTERPRETATION ECG - MUSE: NORMAL
P AXIS - MUSE: 24 DEGREES
PR INTERVAL - MUSE: 144 MS
QRS DURATION - MUSE: 90 MS
QT - MUSE: 372 MS
QTC - MUSE: 426 MS
R AXIS - MUSE: -1 DEGREES
SYSTOLIC BLOOD PRESSURE - MUSE: NORMAL MMHG
T AXIS - MUSE: -11 DEGREES
VENTRICULAR RATE- MUSE: 79 BPM

## 2021-09-16 ENCOUNTER — OFFICE VISIT (OUTPATIENT)
Dept: ORTHOPEDICS | Facility: CLINIC | Age: 56
End: 2021-09-16
Payer: COMMERCIAL

## 2021-09-16 ENCOUNTER — OFFICE VISIT (OUTPATIENT)
Dept: PHARMACY | Facility: CLINIC | Age: 56
End: 2021-09-16
Payer: COMMERCIAL

## 2021-09-16 ENCOUNTER — ANCILLARY PROCEDURE (OUTPATIENT)
Dept: GENERAL RADIOLOGY | Facility: CLINIC | Age: 56
End: 2021-09-16
Attending: PREVENTIVE MEDICINE
Payer: COMMERCIAL

## 2021-09-16 VITALS — BODY MASS INDEX: 31.47 KG/M2 | HEIGHT: 62 IN | WEIGHT: 171 LBS

## 2021-09-16 DIAGNOSIS — E11.9 DIABETES MELLITUS, TYPE 2 (H): ICD-10-CM

## 2021-09-16 DIAGNOSIS — E11.65 TYPE 2 DIABETES MELLITUS WITH HYPERGLYCEMIA, WITH LONG-TERM CURRENT USE OF INSULIN (H): ICD-10-CM

## 2021-09-16 DIAGNOSIS — M25.531 RIGHT WRIST PAIN: Primary | ICD-10-CM

## 2021-09-16 DIAGNOSIS — Z21 ASYMPTOMATIC HUMAN IMMUNODEFICIENCY VIRUS (HIV) INFECTION STATUS (H): ICD-10-CM

## 2021-09-16 DIAGNOSIS — Z79.4 TYPE 2 DIABETES MELLITUS WITH HYPERGLYCEMIA, WITH LONG-TERM CURRENT USE OF INSULIN (H): ICD-10-CM

## 2021-09-16 DIAGNOSIS — S52.601D CLOSED FRACTURE OF DISTAL ENDS OF RIGHT RADIUS AND ULNA WITH ROUTINE HEALING, SUBSEQUENT ENCOUNTER: ICD-10-CM

## 2021-09-16 DIAGNOSIS — Z59.00 HOMELESS: Primary | ICD-10-CM

## 2021-09-16 DIAGNOSIS — S52.501D CLOSED FRACTURE OF DISTAL ENDS OF RIGHT RADIUS AND ULNA WITH ROUTINE HEALING, SUBSEQUENT ENCOUNTER: ICD-10-CM

## 2021-09-16 DIAGNOSIS — M25.531 RIGHT WRIST PAIN: ICD-10-CM

## 2021-09-16 DIAGNOSIS — S52.571A OTHER CLOSED INTRA-ARTICULAR FRACTURE OF DISTAL END OF RIGHT RADIUS, INITIAL ENCOUNTER: Primary | ICD-10-CM

## 2021-09-16 PROCEDURE — 99207 PR NO CHARGE LOS: CPT | Performed by: PHARMACIST

## 2021-09-16 PROCEDURE — 73110 X-RAY EXAM OF WRIST: CPT | Mod: RT | Performed by: RADIOLOGY

## 2021-09-16 PROCEDURE — 29075 APPL CST ELBW FNGR SHORT ARM: CPT | Mod: RT | Performed by: PREVENTIVE MEDICINE

## 2021-09-16 PROCEDURE — 99203 OFFICE O/P NEW LOW 30 MIN: CPT | Mod: 25 | Performed by: PREVENTIVE MEDICINE

## 2021-09-16 SDOH — ECONOMIC STABILITY - HOUSING INSECURITY: HOMELESSNESS UNSPECIFIED: Z59.00

## 2021-09-16 ASSESSMENT — MIFFLIN-ST. JEOR: SCORE: 1489.9

## 2021-09-16 NOTE — LETTER
9/16/2021      RE: Andrew Lockwood  [address] [facility] [address]       Background:   Date of injury: 9/7/21  Duration of symptoms: 9 days  Mechanism of Injury: fell on a outstretched hand after a fall.  Intensity: improved.    Prior Evaluation: Seen at ED on 9/7/21.         Cast/splint application    Date/Time: 9/16/2021 5:19 PM  Performed by: Keily Brothers ATC  Authorized by: Aguila Ingram MD     Consent:     Consent obtained:  Verbal    Consent given by:  Patient    Risks discussed:  Discoloration, numbness, pain and swelling    Alternatives discussed:  No treatment  Pre-procedure details:     Sensation:  Normal  Procedure details:     Laterality:  Right    Location:  Wrist    Wrist:  R wrist    Strapping: no      Cast type:  Short arm    Supplies:  Fiberglass  Post-procedure details:     Pain:  Improved    Sensation:  Normal    Patient tolerance of procedure:  Tolerated well, no immediate complications    Patient provided with cast or splint care instructions: Yes            HISTORY OF PRESENT ILLNESS  Mr. Lockwood is a pleasant 53 year old year old male with diabetes who presents to clinic today with right distal radius and ulna fracture  Andrew explains that he fell over a week ago  And injured his wrist  Location: right wrist frcture  Quality:  achy pain    Severity: 8/10 at worst  Timing: occurs constantly  Context: occurs while exercising and lifting using hand  Modifying factors:  resting and non-use makes it better, movement and use makes it worse  Associated signs & symptoms: some swelling    MEDICAL HISTORY  Patient Active Problem List   Diagnosis     Allergic rhinitis due to other allergen     Brain lesion     Toxoplasma encephalitis (H)     Pulmonary nodules     Human immunodeficiency virus I infection (H)     Folliculitis     Prurigo nodularis     Epidermal cyst     Shoulder joint pain, unspecified laterality     CNS toxoplasmosis (H)     Constipation     Non-intractable vomiting  with nausea, vomiting of unspecified type     Thrush     Insomnia, unspecified insomnia     Bowel obstruction (H)     Toxoplasmosis     Gastroesophageal reflux disease     Headache     Aphthous ulcer     Type 2 diabetes mellitus (H)     Small bowel obstruction (H)     SBO (small bowel obstruction) (H)     Slow transit constipation     Periungual wart     Herpes zoster     Erectile dysfunction, unspecified erectile dysfunction type     Insomnia, unspecified type     Preventative health care     Pain of right thumb     Rupture of ulnar collateral ligament of right thumb     Aftercare following surgery of the musculoskeletal system     Panic disorder     Generalized anxiety disorder     Major depressive disorder, single episode, unspecified     Abdominal pain     Acute appendicitis with localized peritonitis     S/P appendectomy     Abdominal abscess     Appendicitis     Otitis media     Horseshoe tear of retina of left eye without detachment     Adynamic ileus (H)     Pneumonia     Hyperglycemia     Anal dysplasia     Cellulitis     Streptococcal pharyngitis     Strep sore throat     Homeless     Closed fracture of distal ends of right radius and ulna, initial encounter       Current Outpatient Medications   Medication Sig Dispense Refill     Alcohol Swabs PADS Use to swab the area of the injection or todd as directed Per insurance coverage 100 each 0     bictegravir-emtricitabine-tenofovir (BIKTARVY) -25 MG per tablet Take 1 tablet by mouth daily 30 tablet 6     blood glucose (NO BRAND SPECIFIED) test strip Use to test blood sugar 4 times daily or as directed. To accompany:whatever is covered 360 strip 3     blood glucose calibration (NO BRAND SPECIFIED) solution Used to calibrate the blood glucose monitor as needed and as directed.  To accompany  blood glucose brands per insurance coverage 1 each 0     blood glucose monitoring (NO BRAND SPECIFIED) meter device kit Use as directed. Per insurance coverage. 1  "kit 0     glucose 40 % (400 mg/mL) gel 15 g every 15 minutes by mouth as needed for low blood sugar. Oral gel is preferable for conscious and able to swallow patient. 112.5 g 0     ibuprofen (ADVIL/MOTRIN) 800 MG tablet Take 1 tablet (800 mg) by mouth every 6 hours as needed for moderate pain 10 tablet 0     insulin detemir (LEVEMIR PEN) 100 UNIT/ML pen Inject 18 Units Subcutaneous 2 times daily 100 mL 0     insulin pen needle (BD SCOTT U/F) 32G X 4 MM miscellaneous USE 3 TO 4 NEEDLES DAILY 400 each 3     NOVOLOG FLEXPEN 100 UNIT/ML soln Inject 10 Units Subcutaneous 3 times daily (with meals) 75 mL 0     pantoprazole (PROTONIX) 40 MG EC tablet Take 1 tablet (40 mg) by mouth daily 30 tablet 6     polyethylene glycol (MIRALAX) 17 GM/Dose powder Take 17 g by mouth daily as needed 510 g 0     SENNA-docusate sodium (SENNA S) 8.6-50 MG tablet Take 1 tablet by mouth 2 times daily Hold for loose stools. 60 tablet 5     Sharps Container MISC Use as directed to dispose of needles, lancets and other sharps. Per Insurance coverage 1 each 0     STATIN NOT PRESCRIBED (INTENTIONAL) Please choose reason not prescribed, below       thin (NO BRAND SPECIFIED) lancets Use with lanceting device. To accompany: Test 4 times daily with whatever is covered 360 each 3     traMADol (ULTRAM) 50 MG tablet Take 1 tablet (50 mg) by mouth every 6 hours as needed for moderate pain 10 tablet 0       Allergies   Allergen Reactions     Metformin      Abdominal pain     Milk [Lac Bovis] Hives     Tylenol [Acetaminophen] Itching     Dulaglutide Rash     Penicillin V Other (See Comments) and Rash     Diffuse maculopapular rash + feels \"high\", per pt.        Family History   Problem Relation Age of Onset     Diabetes Brother      Diabetes Father      Alzheimer Disease Father      Unknown/Adopted Mother      Diabetes Paternal Grandfather      Cancer No family hx of         no skin cancer     Skin Cancer No family hx of         no famiy hx of skin cancer     " Glaucoma No family hx of      Macular Degeneration No family hx of      Social History     Socioeconomic History     Marital status: Single     Spouse name: Not on file     Number of children: Not on file     Years of education: Not on file     Highest education level: Not on file   Occupational History     Occupation:      Comment: 5 years; temp agency     Occupation: Unemployed   Tobacco Use     Smoking status: Current Every Day Smoker     Packs/day: 0.50     Years: 40.00     Pack years: 20.00     Types: Cigarettes     Smokeless tobacco: Former User   Substance and Sexual Activity     Alcohol use: No     Alcohol/week: 0.0 standard drinks     Comment: Last etoh in 2007     Drug use: Not Currently     Types: Marijuana, Methamphetamines     Comment: Sober 9 months      Sexual activity: Not Currently     Partners: Female, Male     Comment: Last sexual activity 2008   Other Topics Concern     Parent/sibling w/ CABG, MI or angioplasty before 65F 55M? Not Asked   Social History Narrative    Born in [facility].  [place]ame to the [place] in [place].  Last traveled to visit family in 2008.        1/7/2019 - Homeless. Working with a  at [employer]  trying to get housing. Sexually active with two partners recently using condoms 100% of the time. Smokes occasionally, not for the last 4 weeks.        [facility]/7/[facility] since 1/1/2020. 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  Care established with ID and endocrine     Social Determinants of Health     Financial Resource Strain:      Difficulty of Paying Living Expenses:    Food Insecurity:      Worried About Running Out of Food in the Last Year:      Ran Out of Food in the Last Year:    Transportation Needs:      Lack of Transportation (Medical):      Lack of Transportation (Non-Medical):    Physical Activity:      Days of Exercise per Week:      Minutes of Exercise per Session:    Stress:      Feeling of Stress :    Social Connections:      Frequency of Communication  "with Friends and Family:      Frequency of Social Gatherings with Friends and Family:      Attends [employer] Services:      Active Member of Clubs or Organizations:      Attends Club or Organization Meetings:      Marital Status:    Intimate Partner Violence:      Fear of Current or Ex-Partner:      Emotionally Abused:      Physically Abused:      Sexually Abused:        Additional medical/Social/Surgical histories reviewed in [facility] and updated as appropriate.     REVIEW OF SYSTEMS (9/16/2021)  10 point ROS of systems including Constitutional, Eyes, Respiratory, Cardiovascular, Gastroenterology, Genitourinary, Integumentary, Musculoskeletal, Psychiatric, Allergic/Immunologic were all negative except for pertinent positives noted in my HPI.     PHYSICAL EXAM  Vitals:    09/16/21 1341   Weight: 77.6 kg (171 lb)   Height: 1.575 m (5' 2\")     Vital Signs: Ht 1.575 m (5' 2\")   Wt 77.6 kg (171 lb)   BMI 31.28 kg/m   Patient declined being weighed. Body mass index is 31.28 kg/m .    General  - normal appearance, in no obvious distress  HEENT  - conjunctivae not injected, moist mucous membranes, normocephalic/atraumatic head, ears normal appearance, no lesions, mouth normal appearance, no scars, normal dentition and teeth present  CV  - normal radial pulse  Pulm  - normal respiratory pattern, non-labored  Musculoskeletal - wrist  - inspection: normal joint alignment, no obvious deformity, no swelling  - palpation: has ttp over distal radius and ulna, no tenderness at the anatomical snuffbox  - ROM:  Limited ROm due to pain and swelling  - strength: 5/5  strength, 5/5 flexion, extension, pronation, supination, adduction, abduction    Neuro  - no sensory or motor deficit, grossly normal coordination, normal muscle tone  Skin  - no ecchymosis, erythema, warmth, or induration, no obvious rash  Psych  - interactive, appropriate, normal mood and affect  ASSESSMENT & PLAN  54 yo male with HIV and DM2, with right distal ulna " and radius fracture   I independently reviewed the following imaging studies:  Xray shows minimally displaced distal radius and ulna fracture  Cast placed  rx for ibuprofen and tramadol PRN  F/u in 2 weeks for repeat imaging  Appropriate PPE was utilized for prevention of spread of Covid-19.  Aguila Ingram MD, CAM

## 2021-09-16 NOTE — PROGRESS NOTES
Background:   Date of injury: 9/7/21  Duration of symptoms: 9 days  Mechanism of Injury: fell on a outstretched hand after a fall.  Intensity: improved.    Prior Evaluation: Seen at ED on 9/7/21.         Cast/splint application    Date/Time: 9/16/2021 5:19 PM  Performed by: Keily Brothers ATC  Authorized by: Aguila Ingram MD     Consent:     Consent obtained:  Verbal    Consent given by:  Patient    Risks discussed:  Discoloration, numbness, pain and swelling    Alternatives discussed:  No treatment  Pre-procedure details:     Sensation:  Normal  Procedure details:     Laterality:  Right    Location:  Wrist    Wrist:  R wrist    Strapping: no      Cast type:  Short arm    Supplies:  Fiberglass  Post-procedure details:     Pain:  Improved    Sensation:  Normal    Patient tolerance of procedure:  Tolerated well, no immediate complications    Patient provided with cast or splint care instructions: Yes

## 2021-09-16 NOTE — Clinical Note
Hi Team,    It takes a village in Andrew's case -- I'm going to see if he can come in and see us weekly in the ID clinic for a little while to help get him back on track with med adherence.  If you can actually make weekly visits, do you think we could try a weekly GLP for him (I'll ask the pharmacy if they're ok dispensing as such).  Also -- any thoughts on Tresiba instead of his levemir if we can get it covered?    Appreciate any thoughts!    Gena Juarez, MTM pharmacist
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
## 2021-09-23 DIAGNOSIS — M25.531 RIGHT WRIST PAIN: Primary | ICD-10-CM

## 2021-09-24 RX ORDER — TRAMADOL HYDROCHLORIDE 50 MG/1
50 TABLET ORAL EVERY 6 HOURS PRN
Qty: 10 TABLET | Refills: 0 | Status: SHIPPED | OUTPATIENT
Start: 2021-09-24 | End: 2021-12-25

## 2021-09-24 RX ORDER — IBUPROFEN 800 MG/1
800 TABLET, FILM COATED ORAL EVERY 6 HOURS PRN
Qty: 10 TABLET | Refills: 0 | Status: SHIPPED | OUTPATIENT
Start: 2021-09-24 | End: 2021-09-30

## 2021-09-24 NOTE — PROGRESS NOTES
HISTORY OF PRESENT ILLNESS  Mr. Lockwood is a pleasant 53 year old year old male with diabetes who presents to clinic today with right distal radius and ulna fracture  Andrew explains that he fell over a week ago  And injured his wrist  Location: right wrist frcture  Quality:  achy pain    Severity: 8/10 at worst  Timing: occurs constantly  Context: occurs while exercising and lifting using hand  Modifying factors:  resting and non-use makes it better, movement and use makes it worse  Associated signs & symptoms: some swelling    MEDICAL HISTORY  Patient Active Problem List   Diagnosis     Allergic rhinitis due to other allergen     Brain lesion     Toxoplasma encephalitis (H)     Pulmonary nodules     Human immunodeficiency virus I infection (H)     Folliculitis     Prurigo nodularis     Epidermal cyst     Shoulder joint pain, unspecified laterality     CNS toxoplasmosis (H)     Constipation     Non-intractable vomiting with nausea, vomiting of unspecified type     Thrush     Insomnia, unspecified insomnia     Bowel obstruction (H)     Toxoplasmosis     Gastroesophageal reflux disease     Headache     Aphthous ulcer     Type 2 diabetes mellitus (H)     Small bowel obstruction (H)     SBO (small bowel obstruction) (H)     Slow transit constipation     Periungual wart     Herpes zoster     Erectile dysfunction, unspecified erectile dysfunction type     Insomnia, unspecified type     Preventative health care     Pain of right thumb     Rupture of ulnar collateral ligament of right thumb     Aftercare following surgery of the musculoskeletal system     Panic disorder     Generalized anxiety disorder     Major depressive disorder, single episode, unspecified     Abdominal pain     Acute appendicitis with localized peritonitis     S/P appendectomy     Abdominal abscess     Appendicitis     Otitis media     Horseshoe tear of retina of left eye without detachment     Adynamic ileus (H)     Pneumonia     Hyperglycemia     Anal  dysplasia     Cellulitis     Streptococcal pharyngitis     Strep sore throat     Homeless     Closed fracture of distal ends of right radius and ulna, initial encounter       Current Outpatient Medications   Medication Sig Dispense Refill     Alcohol Swabs PADS Use to swab the area of the injection or todd as directed Per insurance coverage 100 each 0     bictegravir-emtricitabine-tenofovir (BIKTARVY) -25 MG per tablet Take 1 tablet by mouth daily 30 tablet 6     blood glucose (NO BRAND SPECIFIED) test strip Use to test blood sugar 4 times daily or as directed. To accompany:whatever is covered 360 strip 3     blood glucose calibration (NO BRAND SPECIFIED) solution Used to calibrate the blood glucose monitor as needed and as directed.  To accompany  blood glucose brands per insurance coverage 1 each 0     blood glucose monitoring (NO BRAND SPECIFIED) meter device kit Use as directed. Per insurance coverage. 1 kit 0     glucose 40 % (400 mg/mL) gel 15 g every 15 minutes by mouth as needed for low blood sugar. Oral gel is preferable for conscious and able to swallow patient. 112.5 g 0     ibuprofen (ADVIL/MOTRIN) 800 MG tablet Take 1 tablet (800 mg) by mouth every 6 hours as needed for moderate pain 10 tablet 0     insulin detemir (LEVEMIR PEN) 100 UNIT/ML pen Inject 18 Units Subcutaneous 2 times daily 100 mL 0     insulin pen needle (BD SCOTT U/F) 32G X 4 MM miscellaneous USE 3 TO 4 NEEDLES DAILY 400 each 3     NOVOLOG FLEXPEN 100 UNIT/ML soln Inject 10 Units Subcutaneous 3 times daily (with meals) 75 mL 0     pantoprazole (PROTONIX) 40 MG EC tablet Take 1 tablet (40 mg) by mouth daily 30 tablet 6     polyethylene glycol (MIRALAX) 17 GM/Dose powder Take 17 g by mouth daily as needed 510 g 0     SENNA-docusate sodium (SENNA S) 8.6-50 MG tablet Take 1 tablet by mouth 2 times daily Hold for loose stools. 60 tablet 5     Sharps Container MISC Use as directed to dispose of needles, lancets and other sharps. Per  "Insurance coverage 1 each 0     STATIN NOT PRESCRIBED (INTENTIONAL) Please choose reason not prescribed, below       thin (NO BRAND SPECIFIED) lancets Use with lanceting device. To accompany: Test 4 times daily with whatever is covered 360 each 3     traMADol (ULTRAM) 50 MG tablet Take 1 tablet (50 mg) by mouth every 6 hours as needed for moderate pain 10 tablet 0       Allergies   Allergen Reactions     Metformin      Abdominal pain     Milk [Lac Bovis] Hives     Tylenol [Acetaminophen] Itching     Dulaglutide Rash     Penicillin V Other (See Comments) and Rash     Diffuse maculopapular rash + feels \"high\", per pt.        Family History   Problem Relation Age of Onset     Diabetes Brother      Diabetes Father      Alzheimer Disease Father      Unknown/Adopted Mother      Diabetes Paternal Grandfather      Cancer No family hx of         no skin cancer     Skin Cancer No family hx of         no famiy hx of skin cancer     Glaucoma No family hx of      Macular Degeneration No family hx of      Social History     Socioeconomic History     Marital status: Single     Spouse name: Not on file     Number of children: Not on file     Years of education: Not on file     Highest education level: Not on file   Occupational History     Occupation:      Comment: 5 years; temp agency     Occupation: Unemployed   Tobacco Use     Smoking status: Current Every Day Smoker     Packs/day: 0.50     Years: 40.00     Pack years: 20.00     Types: Cigarettes     Smokeless tobacco: Former User   Substance and Sexual Activity     Alcohol use: No     Alcohol/week: 0.0 standard drinks     Comment: Last etoh in 2007     Drug use: Not Currently     Types: Marijuana, Methamphetamines     Comment: Sober 9 months      Sexual activity: Not Currently     Partners: Female, Male     Comment: Last sexual activity 2008   Other Topics Concern     Parent/sibling w/ CABG, MI or angioplasty before 65F 55M? Not Asked   Social History Narrative    " "Born in [facility].  [place] the [place] in [place].  Last traveled to visit family in 2008.        1/7/2019 - Homeless. Working with a  at [employer] trying to get housing. Sexually active with two partners recently using condoms 100% of the time. Smokes occasionally, not for the last 4 weeks.        1/7/2020 at [facility] since 1/1/2020. 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  Care established with ID and endocrine     Social Determinants of Health     Financial Resource Strain:      Difficulty of Paying Living Expenses:    Food Insecurity:      Worried About Running Out of Food in the Last Year:      Ran Out of Food in the Last Year:    Transportation Needs:      Lack of Transportation (Medical):      Lack of Transportation (Non-Medical):    Physical Activity:      Days of Exercise per Week:      Minutes of Exercise per Session:    Stress:      Feeling of Stress :    Social Connections:      Frequency of Communication with Friends and Family:      Frequency of Social Gatherings with Friends and Family:      Attends Restorationist Services:      Active Member of Clubs or Organizations:      Attends Club or Organization Meetings:      Marital Status:    Intimate Partner Violence:      Fear of Current or Ex-Partner:      Emotionally Abused:      Physically Abused:      Sexually Abused:        Additional medical/Social/Surgical histories reviewed in [employer] and updated as appropriate.     REVIEW OF SYSTEMS (9/16/2021)  10 point ROS of systems including Constitutional, Eyes, Respiratory, Cardiovascular, Gastroenterology, Genitourinary, Integumentary, Musculoskeletal, Psychiatric, Allergic/Immunologic were all negative except for pertinent positives noted in my HPI.     PHYSICAL EXAM  Vitals:    09/16/21 1341   Weight: 77.6 kg (171 lb)   Height: 1.575 m (5' 2\")     Vital Signs: Ht 1.575 m (5' 2\")   Wt 77.6 kg (171 lb)   BMI 31.28 kg/m   Patient declined being weighed. Body mass index is 31.28 kg/m .    General  - normal " appearance, in no obvious distress  HEENT  - conjunctivae not injected, moist mucous membranes, normocephalic/atraumatic head, ears normal appearance, no lesions, mouth normal appearance, no scars, normal dentition and teeth present  CV  - normal radial pulse  Pulm  - normal respiratory pattern, non-labored  Musculoskeletal - wrist  - inspection: normal joint alignment, no obvious deformity, no swelling  - palpation: has ttp over distal radius and ulna, no tenderness at the anatomical snuffbox  - ROM:  Limited ROm due to pain and swelling  - strength: 5/5  strength, 5/5 flexion, extension, pronation, supination, adduction, abduction    Neuro  - no sensory or motor deficit, grossly normal coordination, normal muscle tone  Skin  - no ecchymosis, erythema, warmth, or induration, no obvious rash  Psych  - interactive, appropriate, normal mood and affect  ASSESSMENT & PLAN  54 yo male with HIV and DM2, with right distal ulna and radius fracture   I independently reviewed the following imaging studies:  Xray shows minimally displaced distal radius and ulna fracture  Cast placed  rx for ibuprofen and tramadol PRN  F/u in 2 weeks for repeat imaging  Appropriate PPE was utilized for prevention of spread of Covid-19.  Aguila Ingram MD, CAQSM

## 2021-09-30 ENCOUNTER — ANCILLARY PROCEDURE (OUTPATIENT)
Dept: GENERAL RADIOLOGY | Facility: CLINIC | Age: 56
End: 2021-09-30
Attending: PREVENTIVE MEDICINE
Payer: COMMERCIAL

## 2021-09-30 ENCOUNTER — OFFICE VISIT (OUTPATIENT)
Dept: ORTHOPEDICS | Facility: CLINIC | Age: 56
End: 2021-09-30
Payer: COMMERCIAL

## 2021-09-30 VITALS — WEIGHT: 171 LBS | BODY MASS INDEX: 31.47 KG/M2 | HEIGHT: 62 IN

## 2021-09-30 DIAGNOSIS — E11.65 TYPE 2 DIABETES MELLITUS WITH HYPERGLYCEMIA, WITH LONG-TERM CURRENT USE OF INSULIN (H): ICD-10-CM

## 2021-09-30 DIAGNOSIS — S52.601A CLOSED FRACTURE OF DISTAL ENDS OF RIGHT RADIUS AND ULNA, INITIAL ENCOUNTER: Primary | ICD-10-CM

## 2021-09-30 DIAGNOSIS — M25.531 RIGHT WRIST PAIN: ICD-10-CM

## 2021-09-30 DIAGNOSIS — Z79.4 TYPE 2 DIABETES MELLITUS WITH HYPERGLYCEMIA, WITH LONG-TERM CURRENT USE OF INSULIN (H): ICD-10-CM

## 2021-09-30 DIAGNOSIS — S52.501A CLOSED FRACTURE OF DISTAL ENDS OF RIGHT RADIUS AND ULNA, INITIAL ENCOUNTER: Primary | ICD-10-CM

## 2021-09-30 PROCEDURE — 99213 OFFICE O/P EST LOW 20 MIN: CPT | Mod: 25 | Performed by: PREVENTIVE MEDICINE

## 2021-09-30 PROCEDURE — 73110 X-RAY EXAM OF WRIST: CPT | Mod: RT | Performed by: RADIOLOGY

## 2021-09-30 PROCEDURE — 29075 APPL CST ELBW FNGR SHORT ARM: CPT | Mod: RT | Performed by: PREVENTIVE MEDICINE

## 2021-09-30 RX ORDER — IBUPROFEN 800 MG/1
800 TABLET, FILM COATED ORAL EVERY 6 HOURS PRN
Qty: 21 TABLET | Refills: 0 | Status: SHIPPED | OUTPATIENT
Start: 2021-09-30 | End: 2021-11-10

## 2021-09-30 ASSESSMENT — MIFFLIN-ST. JEOR: SCORE: 1489.9

## 2021-09-30 NOTE — PROGRESS NOTES
Background:   Date of injury: 9/7/21  Duration of symptoms: 3 weeks  Pain: improving but he reports he is having difficulty in cast. He is having muscle soreness. He is reporting an increase in swelling in his hand by the end of the day.     Medication: he completed Tramadol after 4-5 days.

## 2021-09-30 NOTE — LETTER
9/30/2021      RE: Andrew Lockwood  [address] [facility] [address]       Background:   Date of injury: 9/7/21  Duration of symptoms: 3 weeks  Pain: improving but he reports he is having difficulty in cast. He is having muscle soreness. He is reporting an increase in swelling in his hand by the end of the day.     Medication: he completed Tramadol after 4-5 days.           HISTORY OF PRESENT ILLNESS  Mr. Lockwood is a pleasant 53 year old year old male who presents to clinic today with right wrist fracture  Andrew explains that he had run out of his ibuprofen  And his pain and swelling has increased over the past few days  He thinks his cast is uncomfortable  And wants to replace it  Location: right wrist  Quality:  achy pain    Severity: 8/10 at worst    Duration: 3.5 weeks  Timing: occurs intermittently  Context: occurs while moving wrist  Modifying factors:  resting and non-use makes it better, movement and use makes it worse  Associated signs & symptoms: swelling and pain    MEDICAL HISTORY  Patient Active Problem List   Diagnosis     Allergic rhinitis due to other allergen     Brain lesion     Toxoplasma encephalitis (H)     Pulmonary nodules     Human immunodeficiency virus I infection (H)     Folliculitis     Prurigo nodularis     Epidermal cyst     Shoulder joint pain, unspecified laterality     CNS toxoplasmosis (H)     Constipation     Non-intractable vomiting with nausea, vomiting of unspecified type     Thrush     Insomnia, unspecified insomnia     Bowel obstruction (H)     Toxoplasmosis     Gastroesophageal reflux disease     Headache     Aphthous ulcer     Type 2 diabetes mellitus (H)     Small bowel obstruction (H)     SBO (small bowel obstruction) (H)     Slow transit constipation     Periungual wart     Herpes zoster     Erectile dysfunction, unspecified erectile dysfunction type     Insomnia, unspecified type     Preventative health care     Pain of right thumb     Rupture of ulnar collateral  Submitted to the [place] Dismissal Committee for consideration for dismissal from [facility].   ligament of right thumb     Aftercare following surgery of the musculoskeletal system     Panic disorder     Generalized anxiety disorder     Major depressive disorder, single episode, unspecified     Abdominal pain     Acute appendicitis with localized peritonitis     S/P appendectomy     Abdominal abscess     Appendicitis     Otitis media     Horseshoe tear of retina of left eye without detachment     Adynamic ileus (H)     Pneumonia     Hyperglycemia     Anal dysplasia     Cellulitis     Streptococcal pharyngitis     Strep sore throat     Homeless     Closed fracture of distal ends of right radius and ulna, initial encounter       Current Outpatient Medications   Medication Sig Dispense Refill     Alcohol Swabs PADS Use to swab the area of the injection or todd as directed Per insurance coverage 100 each 0     bictegravir-emtricitabine-tenofovir (BIKTARVY) -25 MG per tablet Take 1 tablet by mouth daily 30 tablet 6     blood glucose (NO BRAND SPECIFIED) test strip Use to test blood sugar 4 times daily or as directed. To accompany:whatever is covered 360 strip 3     blood glucose calibration (NO BRAND SPECIFIED) solution Used to calibrate the blood glucose monitor as needed and as directed.  To accompany  blood glucose brands per insurance coverage 1 each 0     blood glucose monitoring (NO BRAND SPECIFIED) meter device kit Use as directed. Per insurance coverage. 1 kit 0     glucose 40 % (400 mg/mL) gel 15 g every 15 minutes by mouth as needed for low blood sugar. Oral gel is preferable for conscious and able to swallow patient. 112.5 g 0     ibuprofen (ADVIL/MOTRIN) 800 MG tablet Take 1 tablet (800 mg) by mouth every 6 hours as needed for moderate pain 21 tablet 0     insulin detemir (LEVEMIR PEN) 100 UNIT/ML pen Inject 18 Units Subcutaneous 2 times daily 100 mL 0     insulin pen needle (BD SCOTT U/F) 32G X 4 MM miscellaneous USE 3 TO 4 NEEDLES DAILY 400 each 3     NOVOLOG FLEXPEN 100 UNIT/ML soln Inject 10  "Units Subcutaneous 3 times daily (with meals) 75 mL 0     pantoprazole (PROTONIX) 40 MG EC tablet Take 1 tablet (40 mg) by mouth daily 30 tablet 6     polyethylene glycol (MIRALAX) 17 GM/Dose powder Take 17 g by mouth daily as needed 510 g 0     SENNA-docusate sodium (SENNA S) 8.6-50 MG tablet Take 1 tablet by mouth 2 times daily Hold for loose stools. 60 tablet 5     Sharps Container MISC Use as directed to dispose of needles, lancets and other sharps. Per Insurance coverage 1 each 0     STATIN NOT PRESCRIBED (INTENTIONAL) Please choose reason not prescribed, below       thin (NO BRAND SPECIFIED) lancets Use with lanceting device. To accompany: Test 4 times daily with whatever is covered 360 each 3     traMADol (ULTRAM) 50 MG tablet Take 1 tablet (50 mg) by mouth every 6 hours as needed for moderate pain (Patient not taking: Reported on 9/30/2021) 10 tablet 0       Allergies   Allergen Reactions     Metformin      Abdominal pain     Milk [Lac Bovis] Hives     Tylenol [Acetaminophen] Itching     Dulaglutide Rash     Penicillin V Other (See Comments) and Rash     Diffuse maculopapular rash + feels \"high\", per pt.        Family History   Problem Relation Age of Onset     Diabetes Brother      Diabetes Father      Alzheimer Disease Father      Unknown/Adopted Mother      Diabetes Paternal Grandfather      Cancer No family hx of         no skin cancer     Skin Cancer No family hx of         no famiy hx of skin cancer     Glaucoma No family hx of      Macular Degeneration No family hx of      Social History     Socioeconomic History     Marital status: Single     Spouse name: Not on file     Number of children: Not on file     Years of education: Not on file     Highest education level: Not on file   Occupational History     Occupation:      Comment: 5 years; temp agency     Occupation: Unemployed   Tobacco Use     Smoking status: Current Every Day Smoker     Packs/day: 0.50     Years: 40.00     Pack years: " 20.00     Types: Cigarettes     Smokeless tobacco: Former User   Substance and Sexual Activity     Alcohol use: No     Alcohol/week: 0.0 standard drinks     Comment: Last etoh in 2007     Drug use: Not Currently     Types: Marijuana, Methamphetamines     Comment: Sober 9 months      Sexual activity: Not Currently     Partners: Female, Male     Comment: Last sexual activity 2008   Other Topics Concern     Parent/sibling w/ CABG, MI or angioplasty before 65F 55M? Not Asked   Social History Narrative    Born in [facility].  [place] the [place] in [place].  Last traveled to visit family in 2008.        1/7/2019 - Homeless. Working with a  at [employer] trying to get housing. Sexually active with two partners recently using condoms 100% of the time. Smokes occasionally, not for the last 4 weeks.        1/7/2020 at [facility] since 1/1/2020. 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  Care established with ID and endocrine     Social Determinants of Health     Financial Resource Strain:      Difficulty of Paying Living Expenses:    Food Insecurity:      Worried About Running Out of Food in the Last Year:      Ran Out of Food in the Last Year:    Transportation Needs:      Lack of Transportation (Medical):      Lack of Transportation (Non-Medical):    Physical Activity:      Days of Exercise per Week:      Minutes of Exercise per Session:    Stress:      Feeling of Stress :    Social Connections:      Frequency of Communication with Friends and Family:      Frequency of Social Gatherings with Friends and Family:      Attends Mandaen Services:      Active Member of Clubs or Organizations:      Attends Club or Organization Meetings:      Marital Status:    Intimate Partner Violence:      Fear of Current or Ex-Partner:      Emotionally Abused:      Physically Abused:      Sexually Abused:        Additional medical/Social/Surgical histories reviewed in [employer] and updated as appropriate.     REVIEW OF SYSTEMS (9/30/2021)  10 point ROS  "of systems including Constitutional, Eyes, Respiratory, Cardiovascular, Gastroenterology, Genitourinary, Integumentary, Musculoskeletal, Psychiatric, Allergic/Immunologic were all negative except for pertinent positives noted in my HPI.     PHYSICAL EXAM  Vitals:    09/30/21 1256   Weight: 77.6 kg (171 lb)   Height: 1.575 m (5' 2\")     Vital Signs: Ht 1.575 m (5' 2\")   Wt 77.6 kg (171 lb)   BMI 31.28 kg/m   Patient declined being weighed. Body mass index is 31.28 kg/m .    General  - normal appearance, in no obvious distress  HEENT  - conjunctivae not injected, moist mucous membranes, normocephalic/atraumatic head, ears normal appearance, no lesions, mouth normal appearance, no scars, normal dentition and teeth present  CV  - normal radial pulse  Pulm  - normal respiratory pattern, non-labored  Musculoskeletal - right wrist  - inspection: normal joint alignment, has some deformity due to previous wrist fracture, and from this current fracture at distal radius  - palpation: has some ttp at distal radius and ulna at fracture sites to palpation  - ROM:  Limited flexion and extension due to pain  - strength: 5/5  strength, 5/5 flexion with some pain  - special tests:  (-) Tinel's  (-) Finkelstein    Neuro  - no sensory or motor deficit, grossly normal coordination, normal muscle tone  Skin  - no ecchymosis, erythema, warmth, or induration, no obvious rash  Psych  - interactive, appropriate, normal mood and affect  ASSESSMENT & PLAN  52 yo male with distal radius and ulna fractures, slightly displaced, healing    I independently reviewed the following imaging studies:  xrays wrist: shows healing distal radius and ulna fractures  Removed and replaced cast  Cont. Cast x 2-3 weeks  F/u for xrays  RX for ibuprofen  F/u 2-3 weeks    Appropriate PPE was utilized for prevention of spread of Covid-19.  Aguila Ingram MD, CAQSM      Cast/splint application    Date/Time: 9/30/2021 3:35 PM  Performed by: Keily Brothers, " ATC  Authorized by: Aguila Ingram MD     Consent:     Consent obtained:  Verbal    Consent given by:  Patient    Risks discussed:  Discoloration, numbness, pain and swelling    Alternatives discussed:  No treatment  Pre-procedure details:     Sensation:  Normal  Procedure details:     Laterality:  Right    Location:  Wrist    Wrist:  R wrist    Strapping: no      Cast type:  Short arm    Supplies:  Fiberglass  Post-procedure details:     Pain:  Unchanged    Sensation:  Normal    Patient tolerance of procedure:  Tolerated well, no immediate complications    Patient provided with cast or splint care instructions: Yes              Aguila Ingram MD

## 2021-09-30 NOTE — PROGRESS NOTES
HISTORY OF PRESENT ILLNESS  Mr. Lockwood is a pleasant 53 year old year old male who presents to clinic today with right wrist fracture  Andrew explains that he had run out of his ibuprofen  And his pain and swelling has increased over the past few days  He thinks his cast is uncomfortable  And wants to replace it  Location: right wrist  Quality:  achy pain    Severity: 8/10 at worst    Duration: 3.5 weeks  Timing: occurs intermittently  Context: occurs while moving wrist  Modifying factors:  resting and non-use makes it better, movement and use makes it worse  Associated signs & symptoms: swelling and pain    MEDICAL HISTORY  Patient Active Problem List   Diagnosis     Allergic rhinitis due to other allergen     Brain lesion     Toxoplasma encephalitis (H)     Pulmonary nodules     Human immunodeficiency virus I infection (H)     Folliculitis     Prurigo nodularis     Epidermal cyst     Shoulder joint pain, unspecified laterality     CNS toxoplasmosis (H)     Constipation     Non-intractable vomiting with nausea, vomiting of unspecified type     Thrush     Insomnia, unspecified insomnia     Bowel obstruction (H)     Toxoplasmosis     Gastroesophageal reflux disease     Headache     Aphthous ulcer     Type 2 diabetes mellitus (H)     Small bowel obstruction (H)     SBO (small bowel obstruction) (H)     Slow transit constipation     Periungual wart     Herpes zoster     Erectile dysfunction, unspecified erectile dysfunction type     Insomnia, unspecified type     Preventative health care     Pain of right thumb     Rupture of ulnar collateral ligament of right thumb     Aftercare following surgery of the musculoskeletal system     Panic disorder     Generalized anxiety disorder     Major depressive disorder, single episode, unspecified     Abdominal pain     Acute appendicitis with localized peritonitis     S/P appendectomy     Abdominal abscess     Appendicitis     Otitis media     Horseshoe tear of retina of left eye  without detachment     Adynamic ileus (H)     Pneumonia     Hyperglycemia     Anal dysplasia     Cellulitis     Streptococcal pharyngitis     Strep sore throat     Homeless     Closed fracture of distal ends of right radius and ulna, initial encounter       Current Outpatient Medications   Medication Sig Dispense Refill     Alcohol Swabs PADS Use to swab the area of the injection or todd as directed Per insurance coverage 100 each 0     bictegravir-emtricitabine-tenofovir (BIKTARVY) -25 MG per tablet Take 1 tablet by mouth daily 30 tablet 6     blood glucose (NO BRAND SPECIFIED) test strip Use to test blood sugar 4 times daily or as directed. To accompany:whatever is covered 360 strip 3     blood glucose calibration (NO BRAND SPECIFIED) solution Used to calibrate the blood glucose monitor as needed and as directed.  To accompany  blood glucose brands per insurance coverage 1 each 0     blood glucose monitoring (NO BRAND SPECIFIED) meter device kit Use as directed. Per insurance coverage. 1 kit 0     glucose 40 % (400 mg/mL) gel 15 g every 15 minutes by mouth as needed for low blood sugar. Oral gel is preferable for conscious and able to swallow patient. 112.5 g 0     ibuprofen (ADVIL/MOTRIN) 800 MG tablet Take 1 tablet (800 mg) by mouth every 6 hours as needed for moderate pain 21 tablet 0     insulin detemir (LEVEMIR PEN) 100 UNIT/ML pen Inject 18 Units Subcutaneous 2 times daily 100 mL 0     insulin pen needle (BD SCOTT U/F) 32G X 4 MM miscellaneous USE 3 TO 4 NEEDLES DAILY 400 each 3     NOVOLOG FLEXPEN 100 UNIT/ML soln Inject 10 Units Subcutaneous 3 times daily (with meals) 75 mL 0     pantoprazole (PROTONIX) 40 MG EC tablet Take 1 tablet (40 mg) by mouth daily 30 tablet 6     polyethylene glycol (MIRALAX) 17 GM/Dose powder Take 17 g by mouth daily as needed 510 g 0     SENNA-docusate sodium (SENNA S) 8.6-50 MG tablet Take 1 tablet by mouth 2 times daily Hold for loose stools. 60 tablet 5     Sharps  "Container MISC Use as directed to dispose of needles, lancets and other sharps. Per Insurance coverage 1 each 0     STATIN NOT PRESCRIBED (INTENTIONAL) Please choose reason not prescribed, below       thin (NO BRAND SPECIFIED) lancets Use with lanceting device. To accompany: Test 4 times daily with whatever is covered 360 each 3     traMADol (ULTRAM) 50 MG tablet Take 1 tablet (50 mg) by mouth every 6 hours as needed for moderate pain (Patient not taking: Reported on 9/30/2021) 10 tablet 0       Allergies   Allergen Reactions     Metformin      Abdominal pain     Milk [Lac Bovis] Hives     Tylenol [Acetaminophen] Itching     Dulaglutide Rash     Penicillin V Other (See Comments) and Rash     Diffuse maculopapular rash + feels \"high\", per pt.        Family History   Problem Relation Age of Onset     Diabetes Brother      Diabetes Father      Alzheimer Disease Father      Unknown/Adopted Mother      Diabetes Paternal Grandfather      Cancer No family hx of         no skin cancer     Skin Cancer No family hx of         no famiy hx of skin cancer     Glaucoma No family hx of      Macular Degeneration No family hx of      Social History     Socioeconomic History     Marital status: Single     Spouse name: Not on file     Number of children: Not on file     Years of education: Not on file     Highest education level: Not on file   Occupational History     Occupation:      Comment: 5 years; temp agency     Occupation: Unemployed   Tobacco Use     Smoking status: Current Every Day Smoker     Packs/day: 0.50     Years: 40.00     Pack years: 20.00     Types: Cigarettes     Smokeless tobacco: Former User   Substance and Sexual Activity     Alcohol use: No     Alcohol/week: 0.0 standard drinks     Comment: Last etoh in 2007     Drug use: Not Currently     Types: Marijuana, Methamphetamines     Comment: Sober 9 months      Sexual activity: Not Currently     Partners: Female, Male     Comment: Last sexual activity " "2008   Other Topics Concern     Parent/sibling w/ CABG, MI or angioplasty before 65F 55M? Not Asked   Social History Narrative    Born in [facility].  Came to the [place] in 1993.  Last traveled to visit family in 2008.        1/7/2019 - Homeless. Working with a  at [employer]  trying to get housing. Sexually active with two partners recently using condoms 100% of the time. Smokes occasionally, not for the last 4 weeks.        1/7/2020 at [facility] since 1/1/2020. 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  Care established with ID and endocrine     Social Determinants of Health     Financial Resource Strain:      Difficulty of Paying Living Expenses:    Food Insecurity:      Worried About Running Out of Food in the Last Year:      Ran Out of Food in the Last Year:    Transportation Needs:      Lack of Transportation (Medical):      Lack of Transportation (Non-Medical):    Physical Activity:      Days of Exercise per Week:      Minutes of Exercise per Session:    Stress:      Feeling of Stress :    Social Connections:      Frequency of Communication with Friends and Family:      Frequency of Social Gatherings with Friends and Family:      Attends Sikhism Services:      Active Member of Clubs or Organizations:      Attends Club or Organization Meetings:      Marital Status:    Intimate Partner Violence:      Fear of Current or Ex-Partner:      Emotionally Abused:      Physically Abused:      Sexually Abused:        Additional medical/Social/Surgical histories reviewed in EPIC and updated as appropriate.     REVIEW OF SYSTEMS (9/30/2021)  10 point ROS of systems including Constitutional, Eyes, Respiratory, Cardiovascular, Gastroenterology, Genitourinary, Integumentary, Musculoskeletal, Psychiatric, Allergic/Immunologic were all negative except for pertinent positives noted in my HPI.     PHYSICAL EXAM  Vitals:    09/30/21 1256   Weight: 77.6 kg (171 lb)   Height: 1.575 m (5' 2\")     Vital Signs: Ht 1.575 m (5' 2\")   Wt " 77.6 kg (171 lb)   BMI 31.28 kg/m   Patient declined being weighed. Body mass index is 31.28 kg/m .    General  - normal appearance, in no obvious distress  HEENT  - conjunctivae not injected, moist mucous membranes, normocephalic/atraumatic head, ears normal appearance, no lesions, mouth normal appearance, no scars, normal dentition and teeth present  CV  - normal radial pulse  Pulm  - normal respiratory pattern, non-labored  Musculoskeletal - right wrist  - inspection: normal joint alignment, has some deformity due to previous wrist fracture, and from this current fracture at distal radius  - palpation: has some ttp at distal radius and ulna at fracture sites to palpation  - ROM:  Limited flexion and extension due to pain  - strength: 5/5  strength, 5/5 flexion with some pain  - special tests:  (-) Tinel's  (-) Finkelstein    Neuro  - no sensory or motor deficit, grossly normal coordination, normal muscle tone  Skin  - no ecchymosis, erythema, warmth, or induration, no obvious rash  Psych  - interactive, appropriate, normal mood and affect  ASSESSMENT & PLAN  54 yo male with distal radius and ulna fractures, slightly displaced, healing    I independently reviewed the following imaging studies:  xrays wrist: shows healing distal radius and ulna fractures  Removed and replaced cast  Cont. Cast x 2-3 weeks  F/u for xrays  RX for ibuprofen  F/u 2-3 weeks    Appropriate PPE was utilized for prevention of spread of Covid-19.  Aguila Ingram MD, CAQSM      Cast/splint application    Date/Time: 9/30/2021 3:35 PM  Performed by: Keily Brothers, ATC  Authorized by: Aguila Ingram MD     Consent:     Consent obtained:  Verbal    Consent given by:  Patient    Risks discussed:  Discoloration, numbness, pain and swelling    Alternatives discussed:  No treatment  Pre-procedure details:     Sensation:  Normal  Procedure details:     Laterality:  Right    Location:  Wrist    Wrist:  R wrist    Strapping: no      Cast  type:  Short arm    Supplies:  Fiberglass  Post-procedure details:     Pain:  Unchanged    Sensation:  Normal    Patient tolerance of procedure:  Tolerated well, no immediate complications    Patient provided with cast or splint care instructions: Yes
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
## 2021-10-18 DIAGNOSIS — S52.601A CLOSED FRACTURE OF DISTAL ENDS OF RIGHT RADIUS AND ULNA, INITIAL ENCOUNTER: Primary | ICD-10-CM

## 2021-10-18 DIAGNOSIS — S52.501A CLOSED FRACTURE OF DISTAL ENDS OF RIGHT RADIUS AND ULNA, INITIAL ENCOUNTER: Primary | ICD-10-CM

## 2021-10-21 ENCOUNTER — HOSPITAL ENCOUNTER (INPATIENT)
Facility: CLINIC | Age: 56
LOS: 2 days | Discharge: HOME | End: 2021-10-24
Attending: EMERGENCY MEDICINE | Admitting: FAMILY MEDICINE
Payer: COMMERCIAL

## 2021-10-21 DIAGNOSIS — K62.82 ANAL DYSPLASIA: ICD-10-CM

## 2021-10-21 DIAGNOSIS — K56.609 SBO (SMALL BOWEL OBSTRUCTION) (H): Primary | ICD-10-CM

## 2021-10-21 DIAGNOSIS — K56.609 SMALL BOWEL OBSTRUCTION (H): ICD-10-CM

## 2021-10-21 DIAGNOSIS — Z20.822 COVID-19 RULED OUT BY LABORATORY TESTING: ICD-10-CM

## 2021-10-21 PROCEDURE — 999N000127 HC STATISTIC PERIPHERAL IV START W US GUIDANCE

## 2021-10-21 PROCEDURE — 99285 EMERGENCY DEPT VISIT HI MDM: CPT | Performed by: EMERGENCY MEDICINE

## 2021-10-21 PROCEDURE — 85025 COMPLETE CBC W/AUTO DIFF WBC: CPT | Performed by: EMERGENCY MEDICINE

## 2021-10-21 PROCEDURE — 36415 COLL VENOUS BLD VENIPUNCTURE: CPT | Performed by: EMERGENCY MEDICINE

## 2021-10-21 PROCEDURE — 99285 EMERGENCY DEPT VISIT HI MDM: CPT | Mod: 25 | Performed by: EMERGENCY MEDICINE

## 2021-10-21 PROCEDURE — 82040 ASSAY OF SERUM ALBUMIN: CPT | Performed by: EMERGENCY MEDICINE

## 2021-10-21 PROCEDURE — 83690 ASSAY OF LIPASE: CPT | Performed by: EMERGENCY MEDICINE

## 2021-10-21 PROCEDURE — 83036 HEMOGLOBIN GLYCOSYLATED A1C: CPT | Performed by: STUDENT IN AN ORGANIZED HEALTH CARE EDUCATION/TRAINING PROGRAM

## 2021-10-22 ENCOUNTER — APPOINTMENT (OUTPATIENT)
Dept: GENERAL RADIOLOGY | Facility: CLINIC | Age: 56
End: 2021-10-22
Attending: FAMILY MEDICINE
Payer: COMMERCIAL

## 2021-10-22 ENCOUNTER — APPOINTMENT (OUTPATIENT)
Dept: CT IMAGING | Facility: CLINIC | Age: 56
End: 2021-10-22
Attending: EMERGENCY MEDICINE
Payer: COMMERCIAL

## 2021-10-22 LAB
ALBUMIN SERPL-MCNC: 3.2 G/DL (ref 3.4–5)
ALBUMIN SERPL-MCNC: 3.6 G/DL (ref 3.4–5)
ALBUMIN UR-MCNC: NEGATIVE MG/DL
ALP SERPL-CCNC: 111 U/L (ref 40–150)
ALP SERPL-CCNC: 141 U/L (ref 40–150)
ALT SERPL W P-5'-P-CCNC: 16 U/L (ref 0–70)
ALT SERPL W P-5'-P-CCNC: 24 U/L (ref 0–70)
ANION GAP SERPL CALCULATED.3IONS-SCNC: 5 MMOL/L (ref 3–14)
ANION GAP SERPL CALCULATED.3IONS-SCNC: 5 MMOL/L (ref 3–14)
APPEARANCE UR: CLEAR
AST SERPL W P-5'-P-CCNC: 26 U/L (ref 0–45)
AST SERPL W P-5'-P-CCNC: 5 U/L (ref 0–45)
BASOPHILS # BLD AUTO: 0.1 10E3/UL (ref 0–0.2)
BASOPHILS NFR BLD AUTO: 1 %
BILIRUB SERPL-MCNC: 0.6 MG/DL (ref 0.2–1.3)
BILIRUB SERPL-MCNC: 0.7 MG/DL (ref 0.2–1.3)
BILIRUB UR QL STRIP: NEGATIVE
BUN SERPL-MCNC: 12 MG/DL (ref 7–30)
BUN SERPL-MCNC: 16 MG/DL (ref 7–30)
CALCIUM SERPL-MCNC: 8.5 MG/DL (ref 8.5–10.1)
CALCIUM SERPL-MCNC: 9.4 MG/DL (ref 8.5–10.1)
CHLORIDE BLD-SCNC: 101 MMOL/L (ref 94–109)
CHLORIDE BLD-SCNC: 103 MMOL/L (ref 94–109)
CO2 SERPL-SCNC: 25 MMOL/L (ref 20–32)
CO2 SERPL-SCNC: 28 MMOL/L (ref 20–32)
COLOR UR AUTO: ABNORMAL
CREAT SERPL-MCNC: 0.72 MG/DL (ref 0.66–1.25)
CREAT SERPL-MCNC: 0.76 MG/DL (ref 0.66–1.25)
CRP SERPL-MCNC: <2.9 MG/L (ref 0–8)
EOSINOPHIL # BLD AUTO: 0.2 10E3/UL (ref 0–0.7)
EOSINOPHIL NFR BLD AUTO: 2 %
ERYTHROCYTE [DISTWIDTH] IN BLOOD BY AUTOMATED COUNT: 12.6 % (ref 10–15)
ERYTHROCYTE [DISTWIDTH] IN BLOOD BY AUTOMATED COUNT: 13 % (ref 10–15)
GFR SERPL CREATININE-BSD FRML MDRD: >90 ML/MIN/1.73M2
GFR SERPL CREATININE-BSD FRML MDRD: >90 ML/MIN/1.73M2
GLUCOSE BLD-MCNC: 309 MG/DL (ref 70–99)
GLUCOSE BLD-MCNC: 356 MG/DL (ref 70–99)
GLUCOSE BLDC GLUCOMTR-MCNC: 165 MG/DL (ref 70–99)
GLUCOSE BLDC GLUCOMTR-MCNC: 184 MG/DL (ref 70–99)
GLUCOSE BLDC GLUCOMTR-MCNC: 185 MG/DL (ref 70–99)
GLUCOSE BLDC GLUCOMTR-MCNC: 219 MG/DL (ref 70–99)
GLUCOSE BLDC GLUCOMTR-MCNC: 255 MG/DL (ref 70–99)
GLUCOSE BLDC GLUCOMTR-MCNC: 313 MG/DL (ref 70–99)
GLUCOSE BLDC GLUCOMTR-MCNC: 364 MG/DL (ref 70–99)
GLUCOSE UR STRIP-MCNC: >=1000 MG/DL
HBA1C MFR BLD: 10.6 % (ref 0–5.6)
HCT VFR BLD AUTO: 37.2 % (ref 40–53)
HCT VFR BLD AUTO: 40.4 % (ref 40–53)
HGB BLD-MCNC: 12.3 G/DL (ref 13.3–17.7)
HGB BLD-MCNC: 13.6 G/DL (ref 13.3–17.7)
HGB UR QL STRIP: NEGATIVE
HOLD SPECIMEN: NORMAL
HOLD SPECIMEN: NORMAL
IMM GRANULOCYTES # BLD: 0.1 10E3/UL
IMM GRANULOCYTES NFR BLD: 1 %
KETONES BLD-SCNC: 0 MMOL/L (ref 0–0.6)
KETONES UR STRIP-MCNC: NEGATIVE MG/DL
LACTATE SERPL-SCNC: 1 MMOL/L (ref 0.7–2)
LEUKOCYTE ESTERASE UR QL STRIP: NEGATIVE
LIPASE SERPL-CCNC: 221 U/L (ref 73–393)
LYMPHOCYTES # BLD AUTO: 2.3 10E3/UL (ref 0.8–5.3)
LYMPHOCYTES NFR BLD AUTO: 26 %
MAGNESIUM SERPL-MCNC: 1.7 MG/DL (ref 1.6–2.3)
MCH RBC QN AUTO: 28.6 PG (ref 26.5–33)
MCH RBC QN AUTO: 28.7 PG (ref 26.5–33)
MCHC RBC AUTO-ENTMCNC: 33.1 G/DL (ref 31.5–36.5)
MCHC RBC AUTO-ENTMCNC: 33.7 G/DL (ref 31.5–36.5)
MCV RBC AUTO: 85 FL (ref 78–100)
MCV RBC AUTO: 87 FL (ref 78–100)
MONOCYTES # BLD AUTO: 0.6 10E3/UL (ref 0–1.3)
MONOCYTES NFR BLD AUTO: 7 %
MUCOUS THREADS #/AREA URNS LPF: PRESENT /LPF
NEUTROPHILS # BLD AUTO: 5.5 10E3/UL (ref 1.6–8.3)
NEUTROPHILS NFR BLD AUTO: 63 %
NITRATE UR QL: NEGATIVE
NRBC # BLD AUTO: 0 10E3/UL
NRBC BLD AUTO-RTO: 0 /100
PH UR STRIP: 5.5 [PH] (ref 5–7)
PHOSPHATE SERPL-MCNC: 3.1 MG/DL (ref 2.5–4.5)
PLATELET # BLD AUTO: 341 10E3/UL (ref 150–450)
PLATELET # BLD AUTO: 423 10E3/UL (ref 150–450)
POTASSIUM BLD-SCNC: 3.9 MMOL/L (ref 3.4–5.3)
POTASSIUM BLD-SCNC: 4.4 MMOL/L (ref 3.4–5.3)
PROT SERPL-MCNC: 6.5 G/DL (ref 6.8–8.8)
PROT SERPL-MCNC: 7.9 G/DL (ref 6.8–8.8)
RBC # BLD AUTO: 4.29 10E6/UL (ref 4.4–5.9)
RBC # BLD AUTO: 4.75 10E6/UL (ref 4.4–5.9)
RBC URINE: <1 /HPF
SARS-COV-2 RNA RESP QL NAA+PROBE: NEGATIVE
SODIUM SERPL-SCNC: 133 MMOL/L (ref 133–144)
SODIUM SERPL-SCNC: 134 MMOL/L (ref 133–144)
SP GR UR STRIP: >1.05 (ref 1–1.03)
UROBILINOGEN UR STRIP-MCNC: NORMAL MG/DL
WBC # BLD AUTO: 8.2 10E3/UL (ref 4–11)
WBC # BLD AUTO: 8.7 10E3/UL (ref 4–11)
WBC URINE: <1 /HPF

## 2021-10-22 PROCEDURE — 258N000003 HC RX IP 258 OP 636: Performed by: STUDENT IN AN ORGANIZED HEALTH CARE EDUCATION/TRAINING PROGRAM

## 2021-10-22 PROCEDURE — 83735 ASSAY OF MAGNESIUM: CPT | Performed by: STUDENT IN AN ORGANIZED HEALTH CARE EDUCATION/TRAINING PROGRAM

## 2021-10-22 PROCEDURE — 120N000002 HC R&B MED SURG/OB UMMC

## 2021-10-22 PROCEDURE — 250N000011 HC RX IP 250 OP 636: Performed by: STUDENT IN AN ORGANIZED HEALTH CARE EDUCATION/TRAINING PROGRAM

## 2021-10-22 PROCEDURE — 250N000011 HC RX IP 250 OP 636: Performed by: EMERGENCY MEDICINE

## 2021-10-22 PROCEDURE — 82010 KETONE BODYS QUAN: CPT | Performed by: STUDENT IN AN ORGANIZED HEALTH CARE EDUCATION/TRAINING PROGRAM

## 2021-10-22 PROCEDURE — U0003 INFECTIOUS AGENT DETECTION BY NUCLEIC ACID (DNA OR RNA); SEVERE ACUTE RESPIRATORY SYNDROME CORONAVIRUS 2 (SARS-COV-2) (CORONAVIRUS DISEASE [COVID-19]), AMPLIFIED PROBE TECHNIQUE, MAKING USE OF HIGH THROUGHPUT TECHNOLOGIES AS DESCRIBED BY CMS-2020-01-R: HCPCS | Performed by: EMERGENCY MEDICINE

## 2021-10-22 PROCEDURE — 250N000009 HC RX 250: Performed by: EMERGENCY MEDICINE

## 2021-10-22 PROCEDURE — 96376 TX/PRO/DX INJ SAME DRUG ADON: CPT | Performed by: EMERGENCY MEDICINE

## 2021-10-22 PROCEDURE — 82040 ASSAY OF SERUM ALBUMIN: CPT | Performed by: STUDENT IN AN ORGANIZED HEALTH CARE EDUCATION/TRAINING PROGRAM

## 2021-10-22 PROCEDURE — C9803 HOPD COVID-19 SPEC COLLECT: HCPCS | Performed by: EMERGENCY MEDICINE

## 2021-10-22 PROCEDURE — 83605 ASSAY OF LACTIC ACID: CPT | Performed by: EMERGENCY MEDICINE

## 2021-10-22 PROCEDURE — 36415 COLL VENOUS BLD VENIPUNCTURE: CPT | Performed by: STUDENT IN AN ORGANIZED HEALTH CARE EDUCATION/TRAINING PROGRAM

## 2021-10-22 PROCEDURE — 74018 RADEX ABDOMEN 1 VIEW: CPT | Mod: 26 | Performed by: RADIOLOGY

## 2021-10-22 PROCEDURE — 250N000012 HC RX MED GY IP 250 OP 636 PS 637: Performed by: STUDENT IN AN ORGANIZED HEALTH CARE EDUCATION/TRAINING PROGRAM

## 2021-10-22 PROCEDURE — 96374 THER/PROPH/DIAG INJ IV PUSH: CPT | Performed by: EMERGENCY MEDICINE

## 2021-10-22 PROCEDURE — 258N000003 HC RX IP 258 OP 636: Performed by: EMERGENCY MEDICINE

## 2021-10-22 PROCEDURE — 99223 1ST HOSP IP/OBS HIGH 75: CPT | Mod: AI | Performed by: FAMILY MEDICINE

## 2021-10-22 PROCEDURE — 81001 URINALYSIS AUTO W/SCOPE: CPT | Performed by: STUDENT IN AN ORGANIZED HEALTH CARE EDUCATION/TRAINING PROGRAM

## 2021-10-22 PROCEDURE — 86140 C-REACTIVE PROTEIN: CPT | Performed by: STUDENT IN AN ORGANIZED HEALTH CARE EDUCATION/TRAINING PROGRAM

## 2021-10-22 PROCEDURE — 250N000012 HC RX MED GY IP 250 OP 636 PS 637: Performed by: EMERGENCY MEDICINE

## 2021-10-22 PROCEDURE — 84100 ASSAY OF PHOSPHORUS: CPT | Performed by: STUDENT IN AN ORGANIZED HEALTH CARE EDUCATION/TRAINING PROGRAM

## 2021-10-22 PROCEDURE — 82374 ASSAY BLOOD CARBON DIOXIDE: CPT | Performed by: STUDENT IN AN ORGANIZED HEALTH CARE EDUCATION/TRAINING PROGRAM

## 2021-10-22 PROCEDURE — 96361 HYDRATE IV INFUSION ADD-ON: CPT | Performed by: EMERGENCY MEDICINE

## 2021-10-22 PROCEDURE — 250N000013 HC RX MED GY IP 250 OP 250 PS 637: Performed by: STUDENT IN AN ORGANIZED HEALTH CARE EDUCATION/TRAINING PROGRAM

## 2021-10-22 PROCEDURE — 74177 CT ABD & PELVIS W/CONTRAST: CPT

## 2021-10-22 PROCEDURE — 36415 COLL VENOUS BLD VENIPUNCTURE: CPT | Performed by: EMERGENCY MEDICINE

## 2021-10-22 PROCEDURE — 99222 1ST HOSP IP/OBS MODERATE 55: CPT | Performed by: INTERNAL MEDICINE

## 2021-10-22 PROCEDURE — 999N000065 XR ABDOMEN PORT 1 VIEWS

## 2021-10-22 PROCEDURE — 74177 CT ABD & PELVIS W/CONTRAST: CPT | Mod: 26 | Performed by: RADIOLOGY

## 2021-10-22 PROCEDURE — 96375 TX/PRO/DX INJ NEW DRUG ADDON: CPT | Performed by: EMERGENCY MEDICINE

## 2021-10-22 PROCEDURE — 85027 COMPLETE CBC AUTOMATED: CPT | Performed by: STUDENT IN AN ORGANIZED HEALTH CARE EDUCATION/TRAINING PROGRAM

## 2021-10-22 RX ORDER — PROCHLORPERAZINE MALEATE 5 MG
10 TABLET ORAL EVERY 6 HOURS PRN
Status: DISCONTINUED | OUTPATIENT
Start: 2021-10-22 | End: 2021-10-24 | Stop reason: HOSPADM

## 2021-10-22 RX ORDER — ONDANSETRON 2 MG/ML
4 INJECTION INTRAMUSCULAR; INTRAVENOUS EVERY 30 MIN PRN
Status: DISCONTINUED | OUTPATIENT
Start: 2021-10-22 | End: 2021-10-24 | Stop reason: HOSPADM

## 2021-10-22 RX ORDER — PROCHLORPERAZINE 25 MG
25 SUPPOSITORY, RECTAL RECTAL EVERY 12 HOURS PRN
Status: DISCONTINUED | OUTPATIENT
Start: 2021-10-22 | End: 2021-10-22

## 2021-10-22 RX ORDER — DEXTROSE MONOHYDRATE 25 G/50ML
25-50 INJECTION, SOLUTION INTRAVENOUS
Status: DISCONTINUED | OUTPATIENT
Start: 2021-10-22 | End: 2021-10-24 | Stop reason: HOSPADM

## 2021-10-22 RX ORDER — LORAZEPAM 2 MG/ML
0.5 INJECTION INTRAMUSCULAR ONCE
Status: DISCONTINUED | OUTPATIENT
Start: 2021-10-22 | End: 2021-10-24 | Stop reason: HOSPADM

## 2021-10-22 RX ORDER — HYDROMORPHONE HYDROCHLORIDE 1 MG/ML
0.5 INJECTION, SOLUTION INTRAMUSCULAR; INTRAVENOUS; SUBCUTANEOUS
Status: DISCONTINUED | OUTPATIENT
Start: 2021-10-22 | End: 2021-10-22

## 2021-10-22 RX ORDER — SODIUM CHLORIDE, SODIUM LACTATE, POTASSIUM CHLORIDE, CALCIUM CHLORIDE 600; 310; 30; 20 MG/100ML; MG/100ML; MG/100ML; MG/100ML
INJECTION, SOLUTION INTRAVENOUS CONTINUOUS
Status: DISCONTINUED | OUTPATIENT
Start: 2021-10-22 | End: 2021-10-24 | Stop reason: HOSPADM

## 2021-10-22 RX ORDER — ONDANSETRON 2 MG/ML
4 INJECTION INTRAMUSCULAR; INTRAVENOUS EVERY 6 HOURS PRN
Status: DISCONTINUED | OUTPATIENT
Start: 2021-10-22 | End: 2021-10-24 | Stop reason: HOSPADM

## 2021-10-22 RX ORDER — NALOXONE HYDROCHLORIDE 0.4 MG/ML
0.2 INJECTION, SOLUTION INTRAMUSCULAR; INTRAVENOUS; SUBCUTANEOUS
Status: DISCONTINUED | OUTPATIENT
Start: 2021-10-22 | End: 2021-10-24 | Stop reason: HOSPADM

## 2021-10-22 RX ORDER — NALOXONE HYDROCHLORIDE 0.4 MG/ML
0.4 INJECTION, SOLUTION INTRAMUSCULAR; INTRAVENOUS; SUBCUTANEOUS
Status: DISCONTINUED | OUTPATIENT
Start: 2021-10-22 | End: 2021-10-24 | Stop reason: HOSPADM

## 2021-10-22 RX ORDER — MORPHINE SULFATE 2 MG/ML
2-4 INJECTION, SOLUTION INTRAMUSCULAR; INTRAVENOUS
Status: DISCONTINUED | OUTPATIENT
Start: 2021-10-22 | End: 2021-10-22

## 2021-10-22 RX ORDER — NICOTINE POLACRILEX 4 MG
15-30 LOZENGE BUCCAL
Status: DISCONTINUED | OUTPATIENT
Start: 2021-10-22 | End: 2021-10-24 | Stop reason: HOSPADM

## 2021-10-22 RX ORDER — PANTOPRAZOLE SODIUM 40 MG/1
40 TABLET, DELAYED RELEASE ORAL DAILY
Status: DISCONTINUED | OUTPATIENT
Start: 2021-10-22 | End: 2021-10-24

## 2021-10-22 RX ORDER — LIDOCAINE 40 MG/G
CREAM TOPICAL
Status: DISCONTINUED | OUTPATIENT
Start: 2021-10-22 | End: 2021-10-24 | Stop reason: HOSPADM

## 2021-10-22 RX ORDER — ONDANSETRON 4 MG/1
4 TABLET, ORALLY DISINTEGRATING ORAL EVERY 6 HOURS PRN
Status: DISCONTINUED | OUTPATIENT
Start: 2021-10-22 | End: 2021-10-24 | Stop reason: HOSPADM

## 2021-10-22 RX ORDER — HYDROMORPHONE HCL IN WATER/PF 6 MG/30 ML
0.2 PATIENT CONTROLLED ANALGESIA SYRINGE INTRAVENOUS
Status: DISCONTINUED | OUTPATIENT
Start: 2021-10-22 | End: 2021-10-23

## 2021-10-22 RX ORDER — IOPAMIDOL 755 MG/ML
89 INJECTION, SOLUTION INTRAVASCULAR ONCE
Status: COMPLETED | OUTPATIENT
Start: 2021-10-22 | End: 2021-10-22

## 2021-10-22 RX ADMIN — DOCUSATE SODIUM 286 ML: 50 LIQUID ORAL at 11:10

## 2021-10-22 RX ADMIN — MORPHINE SULFATE 4 MG: 2 INJECTION, SOLUTION INTRAMUSCULAR; INTRAVENOUS at 08:24

## 2021-10-22 RX ADMIN — SODIUM CHLORIDE 1000 ML: 9 INJECTION, SOLUTION INTRAVENOUS at 00:56

## 2021-10-22 RX ADMIN — ONDANSETRON 4 MG: 2 INJECTION INTRAMUSCULAR; INTRAVENOUS at 08:24

## 2021-10-22 RX ADMIN — INSULIN ASPART 7 UNITS: 100 INJECTION, SOLUTION INTRAVENOUS; SUBCUTANEOUS at 05:22

## 2021-10-22 RX ADMIN — IOPAMIDOL 89 ML: 755 INJECTION, SOLUTION INTRAVENOUS at 00:46

## 2021-10-22 RX ADMIN — SODIUM CHLORIDE, POTASSIUM CHLORIDE, SODIUM LACTATE AND CALCIUM CHLORIDE: 600; 310; 30; 20 INJECTION, SOLUTION INTRAVENOUS at 04:23

## 2021-10-22 RX ADMIN — SODIUM CHLORIDE, PRESERVATIVE FREE 76 ML: 5 INJECTION INTRAVENOUS at 00:46

## 2021-10-22 RX ADMIN — ONDANSETRON 4 MG: 2 INJECTION INTRAMUSCULAR; INTRAVENOUS at 01:24

## 2021-10-22 RX ADMIN — INSULIN ASPART 4 UNITS: 100 INJECTION, SOLUTION INTRAVENOUS; SUBCUTANEOUS at 08:31

## 2021-10-22 RX ADMIN — SODIUM CHLORIDE, POTASSIUM CHLORIDE, SODIUM LACTATE AND CALCIUM CHLORIDE: 600; 310; 30; 20 INJECTION, SOLUTION INTRAVENOUS at 22:05

## 2021-10-22 RX ADMIN — INSULIN DETEMIR 18 UNITS: 100 INJECTION, SOLUTION SUBCUTANEOUS at 02:17

## 2021-10-22 RX ADMIN — HYDROMORPHONE HYDROCHLORIDE 0.5 MG: 1 INJECTION, SOLUTION INTRAMUSCULAR; INTRAVENOUS; SUBCUTANEOUS at 00:37

## 2021-10-22 RX ADMIN — HYDROMORPHONE HYDROCHLORIDE 0.5 MG: 1 INJECTION, SOLUTION INTRAMUSCULAR; INTRAVENOUS; SUBCUTANEOUS at 01:24

## 2021-10-22 RX ADMIN — MORPHINE SULFATE 4 MG: 2 INJECTION, SOLUTION INTRAMUSCULAR; INTRAVENOUS at 04:24

## 2021-10-22 RX ADMIN — SODIUM CHLORIDE, POTASSIUM CHLORIDE, SODIUM LACTATE AND CALCIUM CHLORIDE: 600; 310; 30; 20 INJECTION, SOLUTION INTRAVENOUS at 13:46

## 2021-10-22 RX ADMIN — BICTEGRAVIR SODIUM, EMTRICITABINE, AND TENOFOVIR ALAFENAMIDE FUMARATE 1 TABLET: 50; 200; 25 TABLET ORAL at 08:24

## 2021-10-22 ASSESSMENT — ENCOUNTER SYMPTOMS
DYSURIA: 0
FEVER: 0
CONSTIPATION: 1
ABDOMINAL PAIN: 1
VOMITING: 1
BLOOD IN STOOL: 0
DIARRHEA: 1
NAUSEA: 1

## 2021-10-22 ASSESSMENT — ACTIVITIES OF DAILY LIVING (ADL)
DOING_ERRANDS_INDEPENDENTLY_DIFFICULTY: NO
ADLS_ACUITY_SCORE: 9
ADLS_ACUITY_SCORE: 9
DIFFICULTY_EATING/SWALLOWING: NO
ADLS_ACUITY_SCORE: 5
CONCENTRATING,_REMEMBERING_OR_MAKING_DECISIONS_DIFFICULTY: NO
ADLS_ACUITY_SCORE: 9
ADLS_ACUITY_SCORE: 9
FALL_HISTORY_WITHIN_LAST_SIX_MONTHS: NO
ADLS_ACUITY_SCORE: 9
DIFFICULTY_COMMUNICATING: NO
ADLS_ACUITY_SCORE: 9
ADLS_ACUITY_SCORE: 5
WEAR_GLASSES_OR_BLIND: NO
ADLS_ACUITY_SCORE: 5
DRESSING/BATHING_DIFFICULTY: NO
ADLS_ACUITY_SCORE: 9
ADLS_ACUITY_SCORE: 5
TOILETING_ISSUES: NO
ADLS_ACUITY_SCORE: 9
ADLS_ACUITY_SCORE: 5
ADLS_ACUITY_SCORE: 9
ADLS_ACUITY_SCORE: 9
PATIENT_/_FAMILY_COMMUNICATION_STYLE: SPOKEN LANGUAGE (ENGLISH OR BILINGUAL)
ADLS_ACUITY_SCORE: 9
HEARING_DIFFICULTY_OR_DEAF: NO
ADLS_ACUITY_SCORE: 5
ADLS_ACUITY_SCORE: 9
WALKING_OR_CLIMBING_STAIRS_DIFFICULTY: NO

## 2021-10-22 NOTE — PLAN OF CARE
Activity: stand by assist  Neuros:alert and oriented x 4  Cardiac:denies chest pain  Respiratory:room air  GI/:LBM 10/22, voiding  Diet:NPO  Skin:generalized scarring, cracked feet, wound on bottom lip  Lines/Drains:PIV infusing    Admitted/transferred from: ED  2 RN full   skin assessment completed by Anabel Bravo, RN and Kate Nogueira RN.  Skin assessment finding: issues found generalized scarring, bilateral cracked feet, wound bottom lip  , broken right arm in cast  Interventions/actions: skin interventions continue to monitor.      Will continue to monitor.

## 2021-10-22 NOTE — PROVIDER NOTIFICATION
Purpose of Notification: NG placement  Notified Person: Dr. Patel  Notification Time: 1811  Notification Interaction: Paged    FYI- pt refused NG placement. reports he just passed gas and his pain has gotten better. Thanks! Annbal [id number]

## 2021-10-22 NOTE — ED PROVIDER NOTES
ED Provider Note  [facility]      History     Chief Complaint   Patient presents with     Abdominal Pain     The history is provided by the patient and medical records.     Andrew Lockwood is a 53 year old male with a past medical history significant for HIV (most recent absolute CD4 count of 519), DM2, recurrent small bowel obstructions, GERD, and hyperlipidemia who presents here to the Emergency Department due to LLQ abdominal pain.  Patient states that he believes he is having a bowel obstruction.  He states his last BM was on 10/20 at 1 PM.  He states that this was an episode of diarrhea.  Patient reports that he started experiencing abdominal pain at 4 PM on 10/21.  He states that this pain came on suddenly.  He reports that this is the same pain he has felt during previous bowel obstructions.  Patient denies fevers.  Patient denies pain with urination.    Per chart review, patient was seen here on 8/22/2021 presenting with LLQ abdominal pain.  The patient's labs were drawn and significant for a lactic acid of 2.7 (which later went down to 1.2), WBC of 6.2, and hemoglobin of 12.7.  The patient was given IV fluids, Zofran, GI cocktail, and fentanyl.  Patient received a CT scan that showed no evidence of bowel obstruction.  The patient's pain improved in the ED and was later discharged.      Past Medical History  Past Medical History:   Diagnosis Date     AIDS (H)      Allergic rhinitis due to other allergen     DNS     Anal dysplasia      Chronic abdominal pain      CNS toxoplasmosis (H)      Diabetes type 2, controlled (H)      GERD (gastroesophageal reflux disease)      History of seizure      History of substance abuse (H)      HIV (human immunodeficiency virus infection) (H)      HLD (hyperlipidemia)      Lung nodules      Periungual wart      PTSD (post-traumatic stress disorder)      Sleep apnea     doesn't use CPAP     Past Surgical History:   Procedure Laterality Date      ANOSCOPY N/A 9/9/2020    Procedure: Exam Under Anesthesia, ANOSCOPY, fulgeration of rectal fissures with Rectal Biopsies;  Surgeon: Thanh Lundberg MD;  Location: [facility] OR     COLONOSCOPY Left 1/22/2016    Procedure: COMBINED COLONOSCOPY, SINGLE OR MULTIPLE BIOPSY/POLYPECTOMY BY BIOPSY;  Surgeon: Clark Saini MD;  Location: [facility] GI     HC EXPLORE UNDESC TESTIS,INGUIN/SCROTAL       LAPAROSCOPIC APPENDECTOMY N/A 1/31/2018    Procedure: LAPAROSCOPIC APPENDECTOMY;  LAPAROSCOPIC APPENDECTOMY;  Surgeon: Dawn Holt MD;  Location: [facility] OR     LAPAROSCOPY DIAGNOSTIC (GENERAL) N/A 7/26/2016    Procedure: LAPAROSCOPY DIAGNOSTIC (GENERAL);  Surgeon: Susannah Arriaga MD;  Location: [facility] OR     LAPAROSCOPY DIAGNOSTIC (GENERAL) N/A 4/16/2018    Procedure: LAPAROSCOPY DIAGNOSTIC (GENERAL);  Diagnostic laparoscopy and lysis of adhesions;  Surgeon: Prince Dowling MD;  Location: [facility] OR     OPTICAL TRACKING SYSTEM CRANIOTOMY, EXCISE TUMOR, COMBINED Left 4/10/2015    Procedure: COMBINED OPTICAL TRACKING SYSTEM CRANIOTOMY, EXCISE TUMOR;  Surgeon: Mirlande Comlenares MD;  Location: [facility] OR     REPAIR GAMEKEEPER'S THUMB Right 12/2/2016    Procedure: REPAIR LIGAMENT ULNAR COLLATERAL THUMB (GAMEKEEPER'S);  Surgeon: Evin Zamorano MD;  Location: [facility] OR     ZZC NONSPECIFIC PROCEDURE      right forearm fracture     Alcohol Swabs PADS  bictegravir-emtricitabine-tenofovir (BIKTARVY) -25 MG per tablet  blood glucose (NO BRAND SPECIFIED) test strip  blood glucose calibration (NO BRAND SPECIFIED) solution  blood glucose monitoring (NO BRAND SPECIFIED) meter device kit  glucose 40 % (400 mg/mL) gel  ibuprofen (ADVIL/MOTRIN) 800 MG tablet  insulin detemir (LEVEMIR PEN) 100 UNIT/ML pen  insulin pen needle (BD SCOTT U/F) 32G X 4 MM miscellaneous  NOVOLOG FLEXPEN 100 UNIT/ML soln  pantoprazole (PROTONIX) 40 MG EC tablet  polyethylene glycol (MIRALAX) 17 GM/Dose powder  SENNA-docusate sodium (SENNA S) 8.6-50 MG  "tablet  Sharps Container MISC  STATIN NOT PRESCRIBED (INTENTIONAL)  thin (NO BRAND SPECIFIED) lancets  traMADol (ULTRAM) 50 MG tablet      Allergies   Allergen Reactions     Metformin      Abdominal pain     Milk [Lac Bovis] Hives     Tylenol [Acetaminophen] Itching     Dulaglutide Rash     Penicillin V Other (See Comments) and Rash     Diffuse maculopapular rash + feels \"high\", per pt.      Family History  Family History   Problem Relation Age of Onset     Diabetes Brother      Diabetes Father      Alzheimer Disease Father      Unknown/Adopted Mother      Diabetes Paternal Grandfather      Cancer No family hx of         no skin cancer     Skin Cancer No family hx of         no famiy hx of skin cancer     Glaucoma No family hx of      Macular Degeneration No family hx of      Social History   Social History     Tobacco Use     Smoking status: Current Every Day Smoker     Packs/day: 0.50     Years: 40.00     Pack years: 20.00     Types: Cigarettes     Smokeless tobacco: Former User   Substance Use Topics     Alcohol use: No     Alcohol/week: 0.0 standard drinks     Comment: Last etoh in 2007     Drug use: Not Currently     Types: Marijuana, Methamphetamines     Comment: Sober 9 months       Past medical history, past surgical history, medications, allergies, family history, and social history were reviewed with the patient. No additional pertinent items.       Review of Systems   Constitutional: Negative for fever.   Gastrointestinal: Positive for abdominal pain (LLQ), constipation, diarrhea, nausea and vomiting. Negative for blood in stool.   Genitourinary: Negative for dysuria.   Allergic/Immunologic: Positive for immunocompromised state.   All other systems reviewed and are negative.    Physical Exam   BP: (!) 147/105  Pulse: 96  Temp: 98.2  F (36.8  C)  Resp: 18  SpO2: 100 %  Physical Exam  Vitals and nursing note reviewed.   Constitutional:       General: He is in acute distress.      Appearance: He is " well-developed. He is ill-appearing. He is not toxic-appearing or diaphoretic.   HENT:      Head: Normocephalic and atraumatic.      Nose: Nose normal.   Eyes:      General: No scleral icterus.     Conjunctiva/sclera: Conjunctivae normal.   Cardiovascular:      Rate and Rhythm: Normal rate.   Pulmonary:      Effort: Pulmonary effort is normal. No respiratory distress.      Breath sounds: No stridor.   Abdominal:      General: There is no distension.      Palpations: Abdomen is soft. There is no mass.      Tenderness: There is abdominal tenderness in the periumbilical area and left lower quadrant. There is guarding. There is no rebound.      Hernia: No hernia is present.   Musculoskeletal:         General: No deformity or signs of injury. Normal range of motion.      Cervical back: Normal range of motion and neck supple. No rigidity.   Skin:     General: Skin is warm and dry.      Coloration: Skin is not jaundiced.      Findings: No rash.   Neurological:      General: No focal deficit present.      Mental Status: He is alert and oriented to person, place, and time.   Psychiatric:         Mood and Affect: Mood normal.         Behavior: Behavior normal.         Thought Content: Thought content normal.         ED Course     12:29 AM  The patient was seen and examined by Jeannine Moses MD in Room ED02.     Procedures              Results for orders placed or performed during the hospital encounter of 10/21/21   CT Abdomen Pelvis w Contrast     Status: None    Narrative    EXAM: CT ABDOMEN PELVIS W CONTRAST  LOCATION: [facility]  DATE/TIME: 10/22/2021 12:43 AM    INDICATION: Left lower quadrant pain. Recurrent bowel obstruction.  COMPARISON: 8/23/2021.  TECHNIQUE: CT scan of the abdomen and pelvis was performed following injection of IV contrast. Multiplanar reformats were obtained. Dose reduction techniques were used.  CONTRAST: 89 ml isovue 370     FINDINGS:   LOWER CHEST:  Normal.    HEPATOBILIARY: Normal.    PANCREAS: Normal.    SPLEEN: Normal.    ADRENAL GLANDS: Normal.    KIDNEYS/BLADDER: Normal.    BOWEL: There is a dilated loop of mid small bowel. Transition point is in the midabdomen anteriorly. Mild mesenteric edema. The stomach is very distended with food debris. No evidence of gastric outlet obstruction. No free intraperitoneal gas or fluid.    LYMPH NODES: Normal.    VASCULATURE: Unremarkable.    PELVIC ORGANS: Penile prosthesis reservoir in the left anterior pelvis.    MUSCULOSKELETAL: Normal.      Impression    IMPRESSION:   1.  Mid small bowel obstruction.   Comprehensive metabolic panel     Status: Abnormal   Result Value Ref Range    Sodium 134 133 - 144 mmol/L    Potassium 4.4 3.4 - 5.3 mmol/L    Chloride 101 94 - 109 mmol/L    Carbon Dioxide (CO2) 28 20 - 32 mmol/L    Anion Gap 5 3 - 14 mmol/L    Urea Nitrogen 16 7 - 30 mg/dL    Creatinine 0.76 0.66 - 1.25 mg/dL    Calcium 9.4 8.5 - 10.1 mg/dL    Glucose 356 (H) 70 - 99 mg/dL    Alkaline Phosphatase 141 40 - 150 U/L    AST 26 0 - 45 U/L    ALT 24 0 - 70 U/L    Protein Total 7.9 6.8 - 8.8 g/dL    Albumin 3.6 3.4 - 5.0 g/dL    Bilirubin Total 0.7 0.2 - 1.3 mg/dL    GFR Estimate >90 >60 mL/min/1.73m2   Lipase     Status: Normal   Result Value Ref Range    Lipase 221 73 - 393 U/L   CBC with platelets and differential     Status: Abnormal   Result Value Ref Range    WBC Count 8.7 4.0 - 11.0 10e3/uL    RBC Count 4.75 4.40 - 5.90 10e6/uL    Hemoglobin 13.6 13.3 - 17.7 g/dL    Hematocrit 40.4 40.0 - 53.0 %    MCV 85 78 - 100 fL    MCH 28.6 26.5 - 33.0 pg    MCHC 33.7 31.5 - 36.5 g/dL    RDW 12.6 10.0 - 15.0 %    Platelet Count 423 150 - 450 10e3/uL    % Neutrophils 63 %    % Lymphocytes 26 %    % Monocytes 7 %    % Eosinophils 2 %    % Basophils 1 %    % Immature Granulocytes 1 %    NRBCs per 100 WBC 0 <1 /100    Absolute Neutrophils 5.5 1.6 - 8.3 10e3/uL    Absolute Lymphocytes 2.3 0.8 - 5.3 10e3/uL    Absolute Monocytes 0.6  0.0 - 1.3 10e3/uL    Absolute Eosinophils 0.2 0.0 - 0.7 10e3/uL    Absolute Basophils 0.1 0.0 - 0.2 10e3/uL    Absolute Immature Granulocytes 0.1 (H) <=0.0 10e3/uL    Absolute NRBCs 0.0 10e3/uL   Extra Blue Top Tube     Status: None   Result Value Ref Range    Hold Specimen JIC    Extra Red Top Tube     Status: None   Result Value Ref Range    Hold Specimen JIC    Lactic acid whole blood     Status: Normal   Result Value Ref Range    Lactic Acid 1.0 0.7 - 2.0 mmol/L   Asymptomatic COVID-19 Virus (Coronavirus) by PCR Nasopharyngeal     Status: Normal    Specimen: Nasopharyngeal; Swab   Result Value Ref Range    SARS CoV2 PCR Negative Negative    Narrative    Testing was performed using the [employer] Xpress SARS-CoV-2 Assay on the  [employer]. Additional information about  this Emergency Use Authorization (EUA) assay can be found via the Lab  Guide. This test should be ordered for the detection of SARS-CoV-2 in  individuals who meet SARS-CoV-2 clinical and/or epidemiological  criteria. Test performance is unknown in asymptomatic patients. This  test is for in vitro diagnostic use under the FDA EUA for  laboratories certified under CLIA to perform high complexity testing.  This test has not been FDA cleared or approved. A negative result  does not rule out the presence of PCR inhibitors in the specimen or  target RNA in concentration below the limit of detection for the  assay. The possibility of a false negative should be considered if  the patient's recent exposure or clinical presentation suggests  COVID-19. This test was validated by the [facility]. This laboratory is certified under  the Clinical Laboratory Improvement Amendments of 1988 (CLIA-88) as  qualified to perform high complexity laboratory testing.     Glucose by meter     Status: Abnormal   Result Value Ref Range    GLUCOSE BY METER POCT 364 (H) 70 - 99 mg/dL   CBC with platelets  differential     Status: Abnormal    Narrative    The following orders were created for panel order CBC with platelets differential.  Procedure                               Abnormality         Status                     ---------                               -----------         ------                     CBC with platelets and d...[[id number]]  Abnormal            Final result                 Please view results for these tests on the individual orders.   [place] Draw     Status: None    Narrative    The following orders were created for panel order [place] Draw.  Procedure                               Abnormality         Status                     ---------                               -----------         ------                     Extra Blue Top Tube[[id number]]                              Final result               Extra Red Top Tube[[id number]]                               Final result                 Please view results for these tests on the individual orders.       CT Abdomen Pelvis w/ Contrast 8/23/2021  IMPRESSION:  1.  The stomach is very distended with air and food debris. No evidence of outlet obstruction. Small and large bowel are within normal limits.  2.  Prostate gland enlargement.  3.  Urinary bladder is nearly empty but appears diffusely thick-walled. No hydronephrosis.      Medications   ondansetron (ZOFRAN) injection 4 mg (4 mg Intravenous Given 10/22/21 0124)   insulin detemir (LEVEMIR PEN) injection 18 Units (18 Units Subcutaneous Given 10/22/21 0217)   bictegravir-emtricitabine-tenofovir (BIKTARVY) -25 MG per tablet 1 tablet (has no administration in time range)   pantoprazole (PROTONIX) EC tablet 40 mg ( Oral Automatically Held 10/25/21 0800)   insulin aspart (NovoLOG) injection (RAPID ACTING) ( Subcutaneous Automatically Held 10/25/21 1800)   lidocaine 1 % 0.1-1 mL (has no administration in time range)   lidocaine (LMX4) cream (has no administration in time range)   sodium chloride  (PF) 0.9% PF flush 3 mL (has no administration in time range)   sodium chloride (PF) 0.9% PF flush 3 mL (has no administration in time range)   lactated ringers infusion (has no administration in time range)   morphine (PF) injection 2-4 mg (has no administration in time range)   ondansetron (ZOFRAN-ODT) ODT tab 4 mg (has no administration in time range)     Or   ondansetron (ZOFRAN) injection 4 mg (has no administration in time range)   prochlorperazine (COMPAZINE) injection 10 mg (has no administration in time range)     Or   prochlorperazine (COMPAZINE) tablet 10 mg (has no administration in time range)     Or   prochlorperazine (COMPAZINE) suppository 25 mg (has no administration in time range)   glucose gel 15-30 g (has no administration in time range)     Or   dextrose 50 % injection 25-50 mL (has no administration in time range)     Or   glucagon injection 1 mg (has no administration in time range)   insulin aspart (NovoLOG) injection (RAPID ACTING) (has no administration in time range)   iopamidol (ISOVUE-370) solution 89 mL (89 mLs Intravenous Given 10/22/21 0046)   sodium chloride 0.9 % bag 500mL for CT scan flush use (76 mLs Intravenous Given 10/22/21 0046)   0.9% sodium chloride BOLUS (0 mLs Intravenous Stopped 10/22/21 0218)        Assessments & Plan (with Medical Decision Making)   Andrew Lockwood is a 53 year old male with a past medical history significant for HIV (most recent absolute CD4 count of 519), DM2, recurrent small bowel obstructions, GERD, and hyperlipidemia who presents here to the Emergency Department due to LLQ abdominal pain.      Ddx: SBO, colitis, diverticulitis, gastroenteritis     Patient with recurrent sxs c/w prior SBO. Given IVF, zofran, dilaudid. Lactate nl. CMP wnl. Hyperglycemic but NPO. Given normal evening dose of levemir. No additional sliding scale while NPO. Will continue to monitor. Lipase nl. CBC nl. CT with mid sbo. Per review of chart, patient has previously declined  surgery and has been admitted to medicine. Ordered NGT which patient refused, as he normally does. Admitted to medicine.      I have reviewed the nursing notes. I have reviewed the findings, diagnosis, plan and need for follow up with the patient.    New Prescriptions    No medications on file       Final diagnoses:   Small bowel obstruction (H)   IScarlett, am serving as a trained medical scribe to document services personally performed by Jeannine Moses MD, based on the provider's statements to me.  IJeannine MD, was physically present and have reviewed and verified the accuracy of this note documented by Scarlett Payne.    --  [facility] EMERGENCY DEPARTMENT  10/21/2021     Jeannine Moses MD  10/22/21 0355

## 2021-10-22 NOTE — H&P
"[facility] Medicine History and Physical        Date of Admission:  10/22/2021  Date of Service: 10/22/2021         HPI      Chief Complaint   \"stomach burns\"    History is obtained from the patient and the electronic medical record.    History of Present Illness   Andrew is a 53 year old male who has a history of recurrent small bowel obstructions of unknown etiology, HIV/AIDS on HAART therapy (undetectable viral load), diabetes mellitus type 2 on insulin, CNS toxoplasmosis (resolved c/b cognitive issues), and hx of housing insecurity who presents with abdominal pain.    Onset of watery diarrhea 10/20/2021.  This was followed by abdominal pain that started around 1600 10/21/21.  Last intake of food 10 AM that day.  Last BM around 1300 that day.  Patient denies any fever, nausea, vomiting.  Stools have not been bloody.  Since onset of abdominal pain, it has come intensely and intermittently.  Patient compares it to the pain he is experienced with prior small bowel obstructions.  He states he is nervous to have an NG tube placed given an episode of choking and becoming hypotensive with the placement of a tube on a prior admission.  He relates that there has not been specific cause found for his recurrent obstructions. He is currently passing gas but has not had a bowel movement.    GI team note from 10/8/2018 reviewed. Historically, small bowel obstructions seemed to start in 2016 prior to acute appendicitis episode 1/31/2018. Previous work-up for Crohn's disease negative. Chronic constipation since childhood brought in consideration of dysmotility issues. Poorly controlled diabetes brought concern for small bowel bacterial overgrowth. Intermittent enteritis with repeat laparoscopies brought in concern for adhesive disease versus ileus. Patient ultimately underwent MRE 1/2019 which had no findings to explain adynamic ileus. He was subsequently lost to " "follow-up MRE given housing instability. Was additionally followed by CRS team last on 9/9/2020 for anoscopy and fulguration in OR which revealed LGSIL and HGSIL in one region of the anus.    From a diabetes standpoint he notes preprandial blood sugars have been in the 100s postprandial blood sugars have been in the 300s to 400s. He has been on Levemir 18 units twice daily and NovoLog 20 units 3 times daily. He states since renting an apartment he has had more consistent food access to give increased preprandial insulin dosing. Has not seen endocrinology since 8/24/20. Last eye exam 2018.    ED Course:   Vitals: Temperature 98.2  F, heart rate 93, blood pressure 140/76, SPO2 100% on room air.   Labs: CMP unremarkable, CBC unremarkable, lactic acid 1.0, lipase 221, blood sugars of 356, 313. Covid negative  Imaging: CT abdomen pelvis with contrast notes \"dilated loop of mid small bowel. Transition point is in the midabdomen anteriorly. Mild mesenteric edema. The stomach is very distended with food debris\"  Interventions: Normal saline 1 L bolus given at midnight. Hydromorphone 0.5 mg IV given at 0040 and 0130 .            History:   Review of Systems   The 10 point Review of Systems is negative other than noted in the HPI or here.     Past Medical History    I have reviewed this patient's medical history and updated it with pertinent information if needed.   Past Medical History:   Diagnosis Date     AIDS (H)      Allergic rhinitis due to other allergen     DNS     Anal dysplasia      Chronic abdominal pain      CNS toxoplasmosis (H)      Diabetes type 2, controlled (H)      GERD (gastroesophageal reflux disease)      History of seizure      History of substance abuse (H)      HIV (human immunodeficiency virus infection) (H)      HLD (hyperlipidemia)      Lung nodules      Periungual wart      PTSD (post-traumatic stress disorder)      Sleep apnea     doesn't use CPAP        Past Surgical History   I have reviewed " this patient's surgical history and updated it with pertinent information if needed.  Past Surgical History:   Procedure Laterality Date     ANOSCOPY N/A 9/9/2020    Procedure: Exam Under Anesthesia, ANOSCOPY, fulgeration of rectal fissures with Rectal Biopsies;  Surgeon: Thanh Lundberg MD;  Location: [facility] OR     COLONOSCOPY Left 1/22/2016    Procedure: COMBINED COLONOSCOPY, SINGLE OR MULTIPLE BIOPSY/POLYPECTOMY BY BIOPSY;  Surgeon: Clark Saini MD;  Location: [facility] GI     HC EXPLORE UNDESC TESTIS,INGUIN/SCROTAL       LAPAROSCOPIC APPENDECTOMY N/A 1/31/2018    Procedure: LAPAROSCOPIC APPENDECTOMY;  LAPAROSCOPIC APPENDECTOMY;  Surgeon: Dawn Holt MD;  Location: [facility] OR     LAPAROSCOPY DIAGNOSTIC (GENERAL) N/A 7/26/2016    Procedure: LAPAROSCOPY DIAGNOSTIC (GENERAL);  Surgeon: Susannah Arriaga MD;  Location: [facility] OR     LAPAROSCOPY DIAGNOSTIC (GENERAL) N/A 4/16/2018    Procedure: LAPAROSCOPY DIAGNOSTIC (GENERAL);  Diagnostic laparoscopy and lysis of adhesions;  Surgeon: Prince Dowling MD;  Location: [facility] OR     OPTICAL TRACKING SYSTEM CRANIOTOMY, EXCISE TUMOR, COMBINED Left 4/10/2015    Procedure: COMBINED OPTICAL TRACKING SYSTEM CRANIOTOMY, EXCISE TUMOR;  Surgeon: Mirlande Colmenares MD;  Location: [facility] OR     REPAIR GAMEKEEPER'S THUMB Right 12/2/2016    Procedure: REPAIR LIGAMENT ULNAR COLLATERAL THUMB (GAMEKEEPER'S);  Surgeon: Evin Zamorano MD;  Location: [facility] OR     ZZC NONSPECIFIC PROCEDURE      right forearm fracture        Social History   Social History     Tobacco Use     Smoking status: Current Every Day Smoker     Packs/day: 0.50     Years: 40.00     Pack years: 20.00     Types: Cigarettes     Smokeless tobacco: Former User   Substance Use Topics     Alcohol use: No     Alcohol/week: 0.0 standard drinks     Comment: Last etoh in 2007     Drug use: Not Currently     Types: Marijuana, Methamphetamines     Comment: Sober 9 months        Family History   I have reviewed this  patient's family history and updated it with pertinent information if needed.   Family History   Problem Relation Age of Onset     Diabetes Brother      Diabetes Father      Alzheimer Disease Father      Unknown/Adopted Mother      Diabetes Paternal Grandfather      Cancer No family hx of         no skin cancer     Skin Cancer No family hx of         no famiy hx of skin cancer     Glaucoma No family hx of      Macular Degeneration No family hx of        Prior to Admission Medications   Prior to Admission Medications   Prescriptions Last Dose Informant Patient Reported? Taking?   Alcohol Swabs PADS   No No   Sig: Use to swab the area of the injection or todd as directed Per insurance coverage   NOVOLOG FLEXPEN 100 UNIT/ML soln   No No   Sig: Inject 10 Units Subcutaneous 3 times daily (with meals)   SENNA-docusate sodium (SENNA S) 8.6-50 MG tablet  Other No No   Sig: Take 1 tablet by mouth 2 times daily Hold for loose stools.   STATIN NOT PRESCRIBED (INTENTIONAL)  Other No No   Sig: Please choose reason not prescribed, below   Sharps Container MISC  Other No No   Sig: Use as directed to dispose of needles, lancets and other sharps. Per Insurance coverage   bictegravir-emtricitabine-tenofovir (BIKTARVY) -25 MG per tablet  Other No No   Sig: Take 1 tablet by mouth daily   blood glucose (NO BRAND SPECIFIED) test strip  Other No No   Sig: Use to test blood sugar 4 times daily or as directed. To accompany:whatever is covered   blood glucose calibration (NO BRAND SPECIFIED) solution  Other No No   Sig: Used to calibrate the blood glucose monitor as needed and as directed.  To accompany  blood glucose brands per insurance coverage   blood glucose monitoring (NO BRAND SPECIFIED) meter device kit  Other No No   Sig: Use as directed. Per insurance coverage.   glucose 40 % (400 mg/mL) gel  Other No No   Sig: 15 g every 15 minutes by mouth as needed for low blood sugar. Oral gel is preferable for conscious and able to  "swallow patient.   ibuprofen (ADVIL/MOTRIN) 800 MG tablet   No No   Sig: Take 1 tablet (800 mg) by mouth every 6 hours as needed for moderate pain   insulin detemir (LEVEMIR PEN) 100 UNIT/ML pen   No No   Sig: Inject 18 Units Subcutaneous 2 times daily   insulin pen needle (BD SCOTT U/F) 32G X 4 MM miscellaneous  Other No No   Sig: USE 3 TO 4 NEEDLES DAILY   pantoprazole (PROTONIX) 40 MG EC tablet  Other No No   Sig: Take 1 tablet (40 mg) by mouth daily   polyethylene glycol (MIRALAX) 17 GM/Dose powder   No No   Sig: Take 17 g by mouth daily as needed   thin (NO BRAND SPECIFIED) lancets  Other No No   Sig: Use with lanceting device. To accompany: Test 4 times daily with whatever is covered   traMADol (ULTRAM) 50 MG tablet   No No   Sig: Take 1 tablet (50 mg) by mouth every 6 hours as needed for moderate pain   Patient not taking: Reported on 9/30/2021      Facility-Administered Medications: None     Allergies   Allergies   Allergen Reactions     Metformin      Abdominal pain     Milk [Lac Bovis] Hives     Tylenol [Acetaminophen] Itching     Dulaglutide Rash     Penicillin V Other (See Comments) and Rash     Diffuse maculopapular rash + feels \"high\", per pt.            Physical Exam      Vital Signs: Temp: 98.2  F (36.8  C) Temp src: Oral BP: 107/86 Pulse: 93   Resp: 18 SpO2: 100 %      Weight: 0 lbs 0 oz    Physical Exam  Vitals and nursing note reviewed.   Constitutional:       General: He is not in acute distress.     Appearance: Normal appearance. He is not ill-appearing.   HENT:      Head: Normocephalic and atraumatic.      Right Ear: External ear normal.      Left Ear: External ear normal.      Nose: Nose normal.      Mouth/Throat:      Mouth: Mucous membranes are dry.   Eyes:      General: No scleral icterus.     Extraocular Movements: Extraocular movements intact.      Conjunctiva/sclera: Conjunctivae normal.   Cardiovascular:      Rate and Rhythm: Normal rate and regular rhythm.      Pulses: Normal pulses.    "   Heart sounds: Normal heart sounds. No murmur heard.   No gallop.    Pulmonary:      Effort: Pulmonary effort is normal. No respiratory distress.      Breath sounds: Normal breath sounds. No wheezing or rhonchi.   Abdominal:      General: There is no distension.      Palpations: Abdomen is soft.      Tenderness: There is abdominal tenderness (at BL upper quadrants L>R). There is no guarding or rebound.      Comments: Hyperactive BS in BL upper quadrants.   Musculoskeletal:      Cervical back: Normal range of motion.      Right lower leg: No edema.      Left lower leg: No edema.   Skin:     General: Skin is warm and dry.      Capillary Refill: Capillary refill takes less than 2 seconds.      Comments: No rash at exposed skin   Neurological:      General: No focal deficit present.      Mental Status: He is alert. Mental status is at baseline.   Psychiatric:         Mood and Affect: Mood normal.         Behavior: Behavior normal.         Assessment & Plan      Andrew is a 53 year old male admitted on 10/21/2021. He has a history of recurrent small bowel obstructions of unknown etiology, HIV/AIDS on HAART therapy (viral load undetectable), diabetes mellitus type 2 on insulin, CNS toxoplasmosis (resolved c/b cognitive issues), and hx of housing insecurity who presented with abdominal pain and was found to have small bowel obstruction.      # Acute on recurrent SBOs of unknown etiology  Hx of recurrent small bowel obstructions (starting in 2016, most recently 3/7/20, 11/11/20 and last 7/13/21) with hx of appendectomy and 2 laparoscopies with lysis of adhesions (last 04/2018). Pt with new onset abdominal pain 10/21 found to have SBO with transitional point at mid abdomen on CT 10/22. Historically had been followed by colorectal surgery and gastroenterology teams with multidisciplinary work-up for recurrent SBO of unknown etiology. Patient was lost to follow-up around 2020 when considering repeat MRE in the course of his  work-up.  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 Has been on Biktarvy since July 2019. Follows with ID, last seen by Dr. Ivory on 8/24/2021. Last CD4 count 8/24/2021 519 with undetectable viral load. Reports compliance with Biktarvy. L ELIN was diagnosed June 2018. Follows with colorectal but anoscopy exams have been inconsistent. Last evaluated 9/2020 with anoscopy and fulguration found to have continued LGSIL and HGSIL in one region of the anus. No invasive malignancy noted.  -Continue PTA Biktarvy -25 mg daily once able to tolerate PO   -Connect patient with CRS follow-up at discharge     # DM2 on insulin with hyperglycemia  Hemoglobin A1c 10.4 8/24/21. Patient notes fasting AM sugars are in 120s post-prandial sugars in 300s-400s. Notes dietary habits have changed since securing housing. Has adjusted pre-prandial insulin to 20U with meals TID.  -Add on A1c  -Decrease Levemir to 18 Units at bedtime while NPO (50% reduction of basal dose)  -Hold prandial novolog 20 U with meals 3 times daily  -POCT glucose Q4H and high sliding scale insulin Q4H   -Hypoglycemia protocol   -Outpatient diabetic eye exam  -Would benefit from a patient centered medical home     # Pain Assessment:  Current Pain Score 10/22/2021   Patient currently in pain? yes   Pain score (0-10) -   Pain location -   Pain descriptors -   - Andrew is experiencing pain due to SBO. Pain management was discussed and the plan was created in a collaborative fashion.  Andrew's response to the current recommendations: engaged  - Please see the plan for pain management as documented above      Diet: NPO for Medical/Clinical Reasons Except for:  Meds  Fluids: LR 125ml/hr  DVT Prophylaxis: Pneumatic Compression Devices  Code Status: Full Code    Disposition Plan   Expected discharge: 2 - 3 days; recommended to prior living arrangement once adequate pain management/ tolerating PO medications and safe disposition plan/ TCU bed available.         Entered: Radha Estrada 10/22/2021, 6:06 AM   Information in the above section will display in the discharge planner report.    Discussed with faculty but not examined by faculty.    This note has been dictated.    Radha Estrada DO  [facility], PGY-2  [facility] Inpatient Service  [facility]   Pager: [phone]  Please see sticky note for cross cover information      Results:     Data   Recent Labs   Lab 10/22/21  0516 10/22/21  0216 10/21/21  2345   WBC  --   --  8.7   HGB  --   --  13.6   MCV  --   --  85   PLT  --   --  423   NA  --   --  134   POTASSIUM  --   --  4.4   CHLORIDE  --   --  101   CO2  --   --  28   BUN  --   --  16   CR  --   --  0.76   ANIONGAP  --   --  5   LINDA  --   --  9.4   * 364* 356*   ALBUMIN  --   --  3.6   PROTTOTAL  --   --  7.9   BILITOTAL  --   --  0.7   ALKPHOS  --   --  141   ALT  --   --  24   AST  --   --  26   LIPASE  --   --  221     Most Recent 3 CBC's:Recent Labs   Lab Test 10/21/21  2345 09/10/21  1133 09/07/21  1657   WBC 8.7 6.7 6.3   HGB 13.6 12.9* 13.7   MCV 85 85 87    328 379     Most Recent 3 BMP's:Recent Labs   Lab Test 10/22/21  0516 10/22/21  0216 10/21/21  2345 09/08/21  0127 09/07/21  1657 08/24/21  1622 08/24/21  1622   NA  --   --  134  --  134  --  137   POTASSIUM  --   --  4.4  --  4.0  --  4.1   CHLORIDE  --   --  101  --  105  --  105   CO2  --   --  28  --  23  --  26   BUN  --   --  16  --  17  --  13   CR  --   --  0.76  --  0.84  --  0.79   ANIONGAP  --   --  5  --  6  --  6   LINDA  --   --  9.4  --  9.1  --  9.2   * 364* 356*   < > 427*   < > 399*    < > = values in this interval not displayed.      Most Recent 2 LFT's:Recent Labs   Lab Test 10/21/21  2345 09/07/21  1657   AST 26 17   ALT 24 23   ALKPHOS 141 169*   BILITOTAL 0.7 0.4     Most Recent Hemoglobin A1c:Recent Labs   Lab Test 10/21/21  2345   A1C 10.6*     Recent Results (from the past 24 hour(s))   CT Abdomen Pelvis w Contrast    Narrative    EXAM: CT ABDOMEN PELVIS W CONTRAST  LOCATION: [facility]  DATE/TIME: 10/22/2021 12:43 AM    INDICATION: Left lower quadrant pain. Recurrent bowel obstruction.  COMPARISON: 8/23/2021.  TECHNIQUE: CT scan of the abdomen and pelvis was performed following injection of IV contrast. Multiplanar reformats were obtained. Dose reduction techniques were used.  CONTRAST: 89 ml isovue 370     FINDINGS:   LOWER CHEST: Normal.    HEPATOBILIARY: Normal.    PANCREAS: Normal.    SPLEEN: Normal.    ADRENAL GLANDS: Normal.    KIDNEYS/BLADDER: Normal.    BOWEL: There is a dilated loop of mid small bowel. Transition point is in the midabdomen anteriorly. Mild mesenteric edema. The stomach is very distended with food debris. No evidence of gastric outlet obstruction. No free intraperitoneal gas or fluid.    LYMPH NODES: Normal.    VASCULATURE: Unremarkable.    PELVIC ORGANS: Penile prosthesis reservoir in the left anterior pelvis.    MUSCULOSKELETAL: Normal.      Impression    IMPRESSION:   1.  Mid small bowel obstruction.

## 2021-10-22 NOTE — PLAN OF CARE
/85 (BP Location: Right arm)   Pulse 86   Temp 98.1  F (36.7  C) (Oral)   Resp 17   SpO2 97%     A&Ox4. Sats mid 90s on RA. Diet was advanced at the beginning of my shift to clears. Pt had chicken broth and 2 jellos. Pt reported shortly afterwards feeling abdominal pain (team paged). Dr. Patel came to bedside to assess. Pt was initially in agreement with NG tube placement but short afterwards refused. Pt reports passing more gas and the pain got better. Team paged regarding this. B and corrected with insulin. Pt up independently in room. (R) arm in cast. Continue to monitor and follow plan of care.

## 2021-10-22 NOTE — PROGRESS NOTES
[facility]   BRIEF PROGRESS NOTE    SUBJECTIVE  Per RN, patient experiencing abdominal pain after eating jello and broth      Patient states he had pain after eating. Describes pain as bloating pain. Points to mid left abdomen. States he has been passing gas. Very hesitant about another NG tube    OBJECTIVE:  /85 (BP Location: Right arm)   Pulse 86   Temp 98.1  F (36.7  C) (Oral)   Resp 17   SpO2 97%     Exam:   General: Alert and oriented, in no acute distress.  Skin: Warm and dry, no abnormalities noted.  CV: No cyanosis or pallor, warm and well perfused.  Respiratory: No respiratory distress, no accessory muscle use.  GI: multiple small surgical scars, bowel sounds appreciated throughout abdomen, soft tender to palpation of left upper quadrant, no guarding, no rebound    ASSESSMENT/PLAN:    # Acute on recurrent SBOs of unknown etiology  Pain after eating. While still passing gas, there is still a concern that he still has an SBO. While he did have a large BM with the pink lady, the original CT showing his SBO also showed a decent stool burden, making it a less helpful indicator that his SBO has passed.    1. Repeat NG Tube with ativan prior to placement  2. Hurricane spray for throat pain  3. IV dilaudid 0.2 for nasal pain, not for abdominal pain  4. Diet discontinued, restarted NPO  5. Insulin orders returned to NPO status (lantus 18 at bedtime, HSSI with q4h BS checks)    Please see daily rounding note for full A/P.  Rianna Patel MD  6:00 PM

## 2021-10-22 NOTE — CONSULTS
"Gastroenterology Consultation  Andrew Lockwood [id number] 53 year old 11/27/1967  10/22/2021        Date of Admission: 10/21/2021  Requesting physician: Conchita Metzger DO       Reason for Consultation:     \"pt here for SBO, hx of SBO, previously seen by GI, lost to follow up. Question regarding inpt management and work up and appropriate out patient follow up\"    History is obtained from the patient         Assessment and Plan:     Andrew Lockwood is a 53 year old male with a PMHX significant for   recurrent small bowel obstructions of unknown etiology s/p Lysis of Lesions, Chronic abdominal Pain, Grade a esophagitis,  HIV/AIDS on HAART therapy (undetectable viral load), diabetes mellitus type 2 on insulin, CNS toxoplasmosis (resolved c/b cognitive issues), and hx of housing insecurity who presents with abdominal pain, concerning for Small Bowel Obstruction.     # Recurrent SBO     > DDx includes: Dysmotility issues, SIBO from uncontrolled diabetes, recurrent         post-surgical adhesive enteropathy, etc.  # History of previous nonspecific inflammatory pattern concerning for ileitis          As mentioned below, patient has received an extensive work-up from MR enterography to Endoscopic work-up w/ biopsies and even exploratory laparoscopies. So far these interventions have been unrevealing.        Unfortunately, patient was also lost to follow-up in [id number], reason for which this precludes a more comprehensive/holistic interpretation of the previous work-up given we have not been able to monitor for interval events and determine any potential trends that may shed further light unto the underlying pathologic process.          At this point, we believe the patient will need some outpatient follow-up to pickup any pertinent issues right were they were left last time. Unfortunately, I do not necessarily see us arriving at a plausible diagnosis any time soon.        When it comes to identifying a potential cause " for such recurrent phenomenon, there is a possibility for a subtle or even more obvious intraluminal stricture that could contribute to ongoing SBO's (despite the last MRE showing NO evidence whatsoever for this). Un controlled diabetes certainly does not help and could precipitate SIBO which could also contributed to the overall obstructive process as well. Lastly, it may not be unreasonable to suggest that there may have been further peritoneal adhesion/fibrotic remodeling from previous abdominal surgical interventions that may also impede proper bowel transit (will defer this part to the corresponding Surgical Services).         For this current hospitalization, we should focus in the conservative treatment of acute Small Bowel Obstruction (as per General Surgery).                Recommendations:     - agree with management for acute SBO as per Surgery  - agree with minimizing if not fully avoiding narcotics as this may contribute to obstructive process  - No indication at the time to perform additional diagnostic studies from a GI perspective  - Will need to schedule outpatient GI follow-up after acute obstructive process resolves    > Will need a repeat MRE    > Will need repeat EGD and Colonoscopy      It has been a pleasure to participate in the care of this patient.  Patient discussed with GI staff, Dr. Marcus Nunez MD Please do not hesitate to contact the GI service with any questions or concerns.     Leo Quiles  Internal Medicine Residency, PGY-3  [facility]   p. [phone]           HPI:      Andrew Lockwood is a 53 year old male with a PMHX significant for   recurrent small bowel obstructions of unknown etiology s/p Lysis of Lesions, Chronic abdominal Pain, Grade a esophagitis,  HIV/AIDS on HAART therapy (undetectable viral load), diabetes mellitus type 2 on insulin, CNS toxoplasmosis (resolved c/b cognitive issues), and hx of housing insecurity who presents with  abdominal pain, concerning for Small Bowel Obstruction.          Most recent interval event leading to hospitalization on 10/21/21, include watery diarrhea on 10/2021, followed by abdominal pain with last intake of food around 10M yesterday and last BM around 1PM. Eventually pain started at 4PM yesterday and has been intermittent/intense. On admission, imaging with the above results. Today, he has remained NPO with NGT to LIS, IVF at 125cc/hor and intermittent PRN narcotics (IV Morphine   2-4 mg Q3H ), zofran and Compazine PRN for nausea.         Both Colorectal Surgery and GI have been consulted for management of recurrent SBO.        This has been a chronic issue for the patient since 2016. So far, work-up for autoimmune etiologies (like IBD has been negative; given negative EGD/Colonoscopy and negative ex-lap with running of the bowel). Multiple diagnoses have been contemplated in the past but never fully ruled in (including from dysmotility issues, SIBO from uncontrolled diabetes, adhesive enteropathy, adynamic  Ileus to anatomical changes related to post-appendectomy, 2018, abscess formation and other perioperative complications requiring additional laparoscopies).            According to previous documentation, patient has constipation at baseline which may seem to be diet related. Occasionally has bloating that improves with flatus.            Has been seen by both GI and Colorectal Surgery (intermittently from [id number]) with most recent encounter on on 09/09/20 when Dr. Lundberg (Colorectal) performed anal epithelium biopsy and fulguration given prior rectal cytology showing LSIL (06/22/2018) which re-demonstrated LSIL (IN1) and HGSIL with no other evidence of invasive malignancy.        Regarding his series of SBO's, unfortunately there has  Been no arrival at a diagnosis. Relevant imaging in the past has also included CT on 10/5/18 showed focal small bowel enteritis with associated wall thickening and  narrowing small bowel dilation proximal to the inflammation.        Because of current health/social disparities and housing insecurities, patient was lost to follow-up in 2019 for additional work-up and evaluation on behalf of GI.  Patient was supposed to see Dr. Molina. Of note, during one of the previous hospitalizations (10/2018) Dr. Molina had suggested the possibility of an intraluminal lesions causing a stricture and thus potentiating the detrimental effects of an SBO. This was later brought to a Multidisciplinary  GI luminal conference to consider the possibility of doing (laparotomy assisted enteroscopy versus further workup with MRE or capsule endoscopy to further explore this anatomical pathology. After multiple considerations, consensus at the time was to perform MRE. However, an MRE was performed on 01/09/2019 and there were no findings to explain adynamic ileus, no evidence of bowel obstruction or inflammatory bowel disease.        Patient was supposed to get a follow-up MRE but this did not happen.        Form an HIV standpoint, patient has followed with Dr. Kaylah Ivory and was last seen on 08/24/21. His last Flow Cytometry showed (absolute CD4+: 519 and an undetectable Viral Load <20). Has been on Biktarvy since 2019.         Regarding his DM type II, his last Hgb a1C's  have been 10.5-->10.3--> 10.6 (since 07/13/2021). Patient has noted AM sugars are in 120s post-prandial sugars in 300s-400s.                Past Medical History:   Reviewed and edited as appropriate  Past Medical History:   Diagnosis Date     AIDS (H)      Allergic rhinitis due to other allergen     DNS     Anal dysplasia      Chronic abdominal pain      CNS toxoplasmosis (H)      Diabetes type 2, controlled (H)      GERD (gastroesophageal reflux disease)      History of seizure      History of substance abuse (H)      HIV (human immunodeficiency virus infection) (H)      HLD (hyperlipidemia)      Lung nodules      Periungual  wart      PTSD (post-traumatic stress disorder)      Sleep apnea     doesn't use CPAP            Past Surgical History:   Reviewed and edited as appropriate   Past Surgical History:   Procedure Laterality Date     ANOSCOPY N/A 9/9/2020    Procedure: Exam Under Anesthesia, ANOSCOPY, fulgeration of rectal fissures with Rectal Biopsies;  Surgeon: Thanh Lundberg MD;  Location: [facility] OR     COLONOSCOPY Left 1/22/2016    Procedure: COMBINED COLONOSCOPY, SINGLE OR MULTIPLE BIOPSY/POLYPECTOMY BY BIOPSY;  Surgeon: Clark Saini MD;  Location: [facility] GI     HC EXPLORE UNDESC TESTIS,INGUIN/SCROTAL       LAPAROSCOPIC APPENDECTOMY N/A 1/31/2018    Procedure: LAPAROSCOPIC APPENDECTOMY;  LAPAROSCOPIC APPENDECTOMY;  Surgeon: aDwn Holt MD;  Location: [facility] OR     LAPAROSCOPY DIAGNOSTIC (GENERAL) N/A 7/26/2016    Procedure: LAPAROSCOPY DIAGNOSTIC (GENERAL);  Surgeon: Susannah Arriaga MD;  Location: [facility] OR     LAPAROSCOPY DIAGNOSTIC (GENERAL) N/A 4/16/2018    Procedure: LAPAROSCOPY DIAGNOSTIC (GENERAL);  Diagnostic laparoscopy and lysis of adhesions;  Surgeon: Prince Dowling MD;  Location: [facility] OR     OPTICAL TRACKING SYSTEM CRANIOTOMY, EXCISE TUMOR, COMBINED Left 4/10/2015    Procedure: COMBINED OPTICAL TRACKING SYSTEM CRANIOTOMY, EXCISE TUMOR;  Surgeon: Mirlande Colmenares MD;  Location: [facility] OR     REPAIR GAMEKEEPER'S THUMB Right 12/2/2016    Procedure: REPAIR LIGAMENT ULNAR COLLATERAL THUMB (GAMEKEEPER'S);  Surgeon: Evin Zamorano MD;  Location:  OR     [facility] NONSPECIFIC PROCEDURE      right forearm fracture            Medications:     Current Facility-Administered Medications   Medication     bictegravir-emtricitabine-tenofovir (BIKTARVY) -25 MG per tablet 1 tablet     glucose gel 15-30 g    Or     dextrose 50 % injection 25-50 mL    Or     glucagon injection 1 mg     insulin aspart (NovoLOG) injection (RAPID ACTING)     insulin detemir (LEVEMIR PEN) injection 18 Units     lactated ringers  infusion     lidocaine (LMX4) cream     lidocaine 1 % 0.1-1 mL     ondansetron (ZOFRAN) injection 4 mg     ondansetron (ZOFRAN-ODT) ODT tab 4 mg    Or     ondansetron (ZOFRAN) injection 4 mg     [Held by provider] pantoprazole (PROTONIX) EC tablet 40 mg     pink lady enema (COMPOUNDED: docusate, magnesium citrate, mineral oil, sodium phosphate)     prochlorperazine (COMPAZINE) injection 10 mg    Or     prochlorperazine (COMPAZINE) tablet 10 mg     sodium chloride (PF) 0.9% PF flush 3 mL     sodium chloride (PF) 0.9% PF flush 3 mL     Current Outpatient Medications   Medication Sig     bictegravir-emtricitabine-tenofovir (BIKTARVY) -25 MG per tablet Take 1 tablet by mouth daily     glucose 40 % (400 mg/mL) gel 15 g every 15 minutes by mouth as needed for low blood sugar. Oral gel is preferable for conscious and able to swallow patient.     insulin detemir (LEVEMIR PEN) 100 UNIT/ML pen Inject 18 Units Subcutaneous 2 times daily     NOVOLOG FLEXPEN 100 UNIT/ML soln Inject 10 Units Subcutaneous 3 times daily (with meals)     pantoprazole (PROTONIX) 40 MG EC tablet Take 1 tablet (40 mg) by mouth daily     polyethylene glycol (MIRALAX) 17 GM/Dose powder Take 17 g by mouth daily as needed     traMADol (ULTRAM) 50 MG tablet Take 1 tablet (50 mg) by mouth every 6 hours as needed for moderate pain     Alcohol Swabs PADS Use to swab the area of the injection or todd as directed Per insurance coverage     blood glucose (NO BRAND SPECIFIED) test strip Use to test blood sugar 4 times daily or as directed. To accompany:whatever is covered     blood glucose calibration (NO BRAND SPECIFIED) solution Used to calibrate the blood glucose monitor as needed and as directed.  To accompany  blood glucose brands per insurance coverage     blood glucose monitoring (NO BRAND SPECIFIED) meter device kit Use as directed. Per insurance coverage.     ibuprofen (ADVIL/MOTRIN) 800 MG tablet Take 1 tablet (800 mg) by mouth every 6 hours as  "needed for moderate pain (Patient not taking: Reported on 10/22/2021)     insulin pen needle (BD SCOTT U/F) 32G X 4 MM miscellaneous USE 3 TO 4 NEEDLES DAILY     SENNA-docusate sodium (SENNA S) 8.6-50 MG tablet Take 1 tablet by mouth 2 times daily Hold for loose stools. (Patient not taking: Reported on 10/22/2021)     Sharps Container MISC Use as directed to dispose of needles, lancets and other sharps. Per Insurance coverage     STATIN NOT PRESCRIBED (INTENTIONAL) Please choose reason not prescribed, below     thin (NO BRAND SPECIFIED) lancets Use with lanceting device. To accompany: Test 4 times daily with whatever is covered            Allergies      Allergies   Allergen Reactions     Metformin      Abdominal pain     Milk [Lac Bovis] Hives     Tylenol [Acetaminophen] Itching     Dulaglutide Rash     Penicillin V Other (See Comments) and Rash     Diffuse maculopapular rash + feels \"high\", per pt.             Social History:   Reviewed and edited as appropriate  Social History     Socioeconomic History     Marital status: Single     Spouse name: Not on file     Number of children: Not on file     Years of education: Not on file     Highest education level: Not on file   Occupational History     Occupation:      Comment: 5 years; temp agency     Occupation: Unemployed   Tobacco Use     Smoking status: Current Every Day Smoker     Packs/day: 0.50     Years: 40.00     Pack years: 20.00     Types: Cigarettes     Smokeless tobacco: Former User   Substance and Sexual Activity     Alcohol use: No     Alcohol/week: 0.0 standard drinks     Comment: Last etoh in 2007     Drug use: Not Currently     Types: Marijuana, Methamphetamines     Comment: Sober 9 months      Sexual activity: Not Currently     Partners: Female, Male     Comment: Last sexual activity 2008   Other Topics Concern     Parent/sibling w/ CABG, MI or angioplasty before 65F 55M? Not Asked   Social History Narrative    Born in [facility].  " Came to the [place] in 1993.  Last traveled to visit family in 2008.        1/7/2019 - Homeless. Working with a  at [employer]  trying to get housing. Sexually active with two partners recently using condoms 100% of the time. Smokes occasionally, not for the last 4 weeks.        1/7/2020 at [facility] since 1/1/2020. 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  Care established with ID and endocrine     Social Determinants of Health     Financial Resource Strain:      Difficulty of Paying Living Expenses:    Food Insecurity:      Worried About Running Out of Food in the Last Year:      Ran Out of Food in the Last Year:    Transportation Needs:      Lack of Transportation (Medical):      Lack of Transportation (Non-Medical):    Physical Activity:      Days of Exercise per Week:      Minutes of Exercise per Session:    Stress:      Feeling of Stress :    Social Connections:      Frequency of Communication with Friends and Family:      Frequency of Social Gatherings with Friends and Family:      Attends Spiritism Services:      Active Member of Clubs or Organizations:      Attends Club or Organization Meetings:      Marital Status:    Intimate Partner Violence:      Fear of Current or Ex-Partner:      Emotionally Abused:      Physically Abused:      Sexually Abused:            Family History:   Reviewed and edited as appropriate  Family History   Problem Relation Age of Onset     Diabetes Brother      Diabetes Father      Alzheimer Disease Father      Unknown/Adopted Mother      Diabetes Paternal Grandfather      Cancer No family hx of         no skin cancer     Skin Cancer No family hx of         no famiy hx of skin cancer     Glaucoma No family hx of      Macular Degeneration No family hx of       No known history of colorectal cancer, liver disease, or inflammatory bowel disease.         Review of Systems:   A complete 10 point review of systems was obtained.  Please see the HPI for pertinent positives and negatives.  All  other systems were reviewed and were found to be negative.          Physical Exam:   VS:  /86   Pulse 93   Temp 98.7  F (37.1  C) (Oral)   Resp 18   SpO2 100%     Wt:   Wt Readings from Last 2 Encounters:   09/30/21 77.6 kg (171 lb)   09/16/21 77.6 kg (171 lb)      Constitutional: cooperative, pleasant, no acute distress  Eyes: Conjunctiva anicteric  HEENT: Normal oropharynx without ulcers or exudate, mucus membranes moist  CV: No Martha edema  Respiratory: Breathing comfortably on ambient air  Abd:  Nondistended, +bs, no hepatosplenomegaly, nontender, no rebound or guarding   Skin: warm, perfused, no jaundice  Neuro: A&O x 3, No asterixis         Laboratory:   BMP  Recent Labs   Lab 10/22/21  0829 10/22/21  0600 10/22/21  0516 10/22/21  0216 10/21/21  2345   NA  --  133  --   --  134   POTASSIUM  --  3.9  --   --  4.4   CHLORIDE  --  103  --   --  101   LINDA  --  8.5  --   --  9.4   CO2  --  25  --   --  28   BUN  --  12  --   --  16   CR  --  0.72  --   --  0.76   * 309* 313* 364* 356*     CBC  Recent Labs   Lab 10/22/21  0600 10/21/21  2345   WBC 8.2 8.7   RBC 4.29* 4.75   HGB 12.3* 13.6   HCT 37.2* 40.4   MCV 87 85   MCH 28.7 28.6   MCHC 33.1 33.7   RDW 13.0 12.6    423     INRNo lab results found in last 7 days.  Liver Enzymes  Recent Labs   Lab 10/22/21  0600 10/21/21  2345   ALKPHOS 111 141   AST 5 26   ALT 16 24   BILITOTAL 0.6 0.7   PROTTOTAL 6.5* 7.9   ALBUMIN 3.2* 3.6      PANC  Recent Labs   Lab 10/21/21  2345   LIPASE 221       Imaging/Procedures/Studies:   EXAM: CT ABDOMEN PELVIS W CONTRAST  LOCATION: [facility]  DATE/TIME: 10/22/2021 12:43 AM          INDICATION: Left lower quadrant pain. Recurrent bowel obstruction.  COMPARISON: 8/23/2021.  TECHNIQUE: CT scan of the abdomen and pelvis was performed following injection of IV contrast. Multiplanar reformats were obtained. Dose reduction techniques were used.  CONTRAST: 89 ml isovue 370       FINDINGS:   LOWER CHEST: Normal.     HEPATOBILIARY: Normal.     PANCREAS: Normal.     SPLEEN: Normal.     ADRENAL GLANDS: Normal.     KIDNEYS/BLADDER: Normal.     BOWEL: There is a dilated loop of mid small bowel. Transition point is in the midabdomen anteriorly. Mild mesenteric edema. The stomach is very distended with food debris. No evidence of gastric outlet obstruction. No free intraperitoneal gas or fluid.     LYMPH NODES: Normal.     VASCULATURE: Unremarkable.     PELVIC ORGANS: Penile prosthesis reservoir in the left anterior pelvis.     MUSCULOSKELETAL: Normal.                                                                      IMPRESSION:   1.  Mid small bowel obstruction.    EXAM: XR ABDOMEN PORT 1 VIEWS  LOCATION: [facility]  DATE/TIME: 10/22/2021 5:31 AM     INDICATION: NG placement  COMPARISON: None.                                                                          IMPRESSION: NG tube in the stomach with tip at the gastric fundus.

## 2021-10-22 NOTE — PROGRESS NOTES
[facility]   BRIEF PROGRESS NOTE    SUBJECTIVE  Assessed patient after BM after pink lady enema     Patient doing well, watching Friends comfortably in bed. States that he had a large BM with the pink lady. Abdominal pain feels better. States he feels hungry and would like some broth or jello.    OBJECTIVE:  /86 (BP Location: Left arm)   Pulse 81   Temp 97  F (36.1  C) (Oral)   Resp 16   SpO2 98%     Exam:   General: Alert and oriented, in no acute distress.  Skin: Warm and dry, no abnormalities noted.  CV: No cyanosis or pallor, warm and well perfused.  Respiratory: No respiratory distress, no accessory muscle use.  GI: multiple small surgical scars, bowel sounds appreciated throughout abdomen, nontender to palpation throughout, no guarding, no rebound  Psychiatric: Mood and affect appear normal.   Extremities: Warm, able to move all four extremities at will.      ASSESSMENT/PLAN:    # Acute on recurrent SBOs of unknown etiology  No reported pain. Normal bowel sounds, nontender to palpation. Requesting food. Last ate 2 days ago.  - Advance diet as tolerated to clear liquids  - Continue to assess for worsening pain  - Restart home insulin regiment (lantus 18 BID) and change short acting to     Please see daily rounding note for full A/P.  Rianna Patel MD  3:08 PM

## 2021-10-22 NOTE — PROGRESS NOTES
[facility]    Progress Note - [facility] Service        Date of Admission:  10/21/2021    Assessment & Plan             Plan for today   - Morphine discontinued   - Pink lady  - Reassess abdomen s/p enema    Andrew Lockwood is a 53 year old male admitted on 10/21/2021. He has a history of recurrent small bowel obstructions of unknown etiology, HIV/AIDS on HAART therapy (viral load undetectable), diabetes mellitus type 2 on insulin, CNS toxoplasmosis (resolved c/b cognitive issues), and hx of housing insecurity (currently with housing) who presented with abdominal pain and was found to have small bowel obstruction.     # Acute on recurrent SBOs of unknown etiology  Hx of recurrent small bowel obstructions (starting in 2016, most recently 3/7/20, 11/11/20 and last 7/13/21) with hx of appendectomy and 2 laparoscopies with lysis of adhesions (last 04/2018). Pt with new onset abdominal pain 10/21 found to have SBO with transitional point at mid abdomen and notable stool burden on CT 10/22. Historically had been followed by colorectal surgery and gastroenterology teams with multidisciplinary work-up for recurrent SBO of unknown etiology. Patient was lost to follow-up around 2020 when considering repeat MRE in the course of his work-up. Now stating pain improved, self removed NGT, requesting pink lady enema.  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 Has been on Biktarvy since July 2019. Follows with ID, last seen by Dr. Ivory on 8/24/2021. Last CD4 count 8/24/2021 519 with undetectable viral load. Reports compliance with Biktarvy. L ELIN was diagnosed June 2018. Follows with colorectal but  anoscopy exams have been inconsistent. Last evaluated 9/2020 with anoscopy and fulguration found to have continued LGSIL and HGSIL in one region of the anus. No invasive malignancy noted.  -Continue PTA Biktarvy -25 mg daily once able to tolerate PO   -Possible CRS follow-up at discharge     # DM2 on insulin with hyperglycemia  Hemoglobin A1c 10.3 (8/24/21), 10.6 (10/21/21). Patient notes fasting AM sugars are in 120s post-prandial sugars in 300s-400s. Notes dietary habits have changed since securing housing. Has adjusted pre-prandial insulin to 20U with meals TID.   - Levemir 18 Units at bedtime while NPO (50% reduction of basal dose)  - POCT glucose Q4H and high sliding scale insulin Q4H   - Hold prandial novolog 20 U while NPO     Diet: NPO for Medical/Clinical Reasons Except for: Meds    DVT Prophylaxis: Pneumatic Compression Devices  Potts Catheter: Not present  Fluids: 125 ml/hr LR  Central Lines: None  Code Status: Full Code      Disposition Plan   Expected discharge: 10/25/2021   recommended to prior living arrangement once adequate pain management/ tolerating PO medications and safe disposition plan/ TCU bed available.     The patient's care was discussed with the Attending Physician, Dr. Good.    Rianna Patel MD  [facility]  Securely message with the Vocera Web Console (learn more here)  Text page via [facility] Paging/Directory    ______________________________________________________________________    Interval History   Patient states that he is having abdominal pain as the pain medication wears off. This AM I spoke with him right before his pain medication, states that the pain is not worsened, but still present. States he has been passing gas but has not had a bowel movement. Asking for a pink lady enema.     Later this AM, patient self-removed his NGT and refused to have it put back in. He shares he is feeling better and that nothing  was coming out of the NGT.    Data reviewed today: I reviewed all medications, new labs and imaging results over the last 24 hours. I personally reviewed the abdominal x-ray image(s) showing NG with good placement and the abdominal CT image(s) showing dilated loop of mid small bowel with transition point in mid abdomen anteriorly.    Physical Exam   Vital Signs: Temp: 98.2  F (36.8  C) Temp src: Oral BP: 107/86 Pulse: 93   Resp: 18 SpO2: 99 %      Weight: 0 lbs 0 oz  Constitutional: awake, alert, cooperative, no apparent distress, and appears stated age  Respiratory: No increased work of breathing, good air exchange, clear to auscultation bilaterally, no crackles or wheezing  Cardiovascular: normal S1 and S2, no murmur noted and no edema  GI: several surgical scars noted, normal bowel sounds in upper quadrants, hypoactive bowel sounds in lower quadrants, non-distended, tenderness noted in the left upper quadrant, no guarding  Neurologic: Alert, at baseline    Data   Recent Labs   Lab 10/22/21  0600 10/22/21  0516 10/22/21  0216 10/21/21  2345   WBC 8.2  --   --  8.7   HGB 12.3*  --   --  13.6   MCV 87  --   --  85     --   --  423     --   --  134   POTASSIUM 3.9  --   --  4.4   CHLORIDE 103  --   --  101   CO2 25  --   --  28   BUN 12  --   --  16   CR 0.72  --   --  0.76   ANIONGAP 5  --   --  5   LINDA 8.5  --   --  9.4   * 313* 364* 356*   ALBUMIN 3.2*  --   --  3.6   PROTTOTAL 6.5*  --   --  7.9   BILITOTAL 0.6  --   --  0.7   ALKPHOS 111  --   --  141   ALT 16  --   --  24   AST 5  --   --  26   LIPASE  --   --   --  221     Recent Results (from the past 24 hour(s))   CT Abdomen Pelvis w Contrast    Narrative    EXAM: CT ABDOMEN PELVIS W CONTRAST  LOCATION: [facility]  DATE/TIME: 10/22/2021 12:43 AM    INDICATION: Left lower quadrant pain. Recurrent bowel obstruction.  COMPARISON: 8/23/2021.  TECHNIQUE: CT scan of the abdomen and pelvis was  performed following injection of IV contrast. Multiplanar reformats were obtained. Dose reduction techniques were used.  CONTRAST: 89 ml isovue 370     FINDINGS:   LOWER CHEST: Normal.    HEPATOBILIARY: Normal.    PANCREAS: Normal.    SPLEEN: Normal.    ADRENAL GLANDS: Normal.    KIDNEYS/BLADDER: Normal.    BOWEL: There is a dilated loop of mid small bowel. Transition point is in the midabdomen anteriorly. Mild mesenteric edema. The stomach is very distended with food debris. No evidence of gastric outlet obstruction. No free intraperitoneal gas or fluid.    LYMPH NODES: Normal.    VASCULATURE: Unremarkable.    PELVIC ORGANS: Penile prosthesis reservoir in the left anterior pelvis.    MUSCULOSKELETAL: Normal.      Impression    IMPRESSION:   1.  Mid small bowel obstruction.   XR Abdomen Port 1 View    Narrative    EXAM: XR ABDOMEN PORT 1 VIEWS  LOCATION: [facility]  DATE/TIME: 10/22/2021 5:31 AM    INDICATION: NG placement  COMPARISON: None.      Impression    IMPRESSION: NG tube in the stomach with tip at the gastric fundus.      Medications     lactated ringers 125 mL/hr at 10/22/21 0708       bictegravir-emtricitabine-tenofovir  1 tablet Oral Daily     insulin aspart  1-12 Units Subcutaneous Q4H     insulin detemir  18 Units Subcutaneous At Bedtime     [Held by provider] pantoprazole  40 mg Oral Daily     sodium chloride (PF)  3 mL Intracatheter Q8H

## 2021-10-22 NOTE — ED NOTES
RN went in to place NG, educated pt on importance and why we were placing, pt verbalized understanding. Pt refused MD MARLON aware

## 2021-10-22 NOTE — CONSULTS
EGS Surgery Consult  2021    Andrew Lockwood  : 1967    Date of Service: 10/22/2021 9:26 AM    Assessment and Plan:  Andrew Lockwood is a 53 year old male with history of HIV/AIDS on HAART, DM2 on insulin, CNS toxoplasmosis, chronic constipation, recurrent small bowel obstructions s/p laparoscopic ANASTASIIA (2018), here with recurrent SBO. NGT was placed but now removed by patient and refusing cares. Minimal abdominal tenderness, but CT concerning for mid-small bowel SBO and has received large doses of narcotic.     - No surgical intervention   - Recommend replacement of NGT given stomach and small bowel distention. If patient continues to refuse, should be NPO and would not give pain medications, as this is masking his symptoms and giving him reason to believe he does not need the NGT  - Minimize narcotics either way   - Maintain lytes Mg > 2, Ph > 3, K > 4  - EGS to follow     Discussed with chief resident, Dr. Santana, who will discuss with staff, Dr. Yanet Duarte MD  PGY-2 General Surgery     History of Present Illness:    Andrew Lockwood is a 53 year old male with history of HIV/AIDS on HAART, DM2 on insulin, CNS toxoplasmosis, chronic constipation, recurrent small bowel obstructions s/p laparoscopic ANASTASIIA (2018), well known to the general surgery service given hx of SBO. These started in 2016, he underwent lap appy, and has several recurrent episodes since, presumed to be related to adhesive disease, as he then required laparoscopy with ANASTASIIA in 2018. He has a history of chronic constipation and has been worked up for adynamic ileus, and has not had follow up MRE due to housing instability. He presented to the ED yesterday with watery diarrhea x1 day, followed by abdominal pain. He states this is typical of when he has had obstructions in the past. Last bowel movement was yesterday evening. Pain is localized to the left lower quadrant and is cramping in nature, no radiation. He denies nausea  "and vomiting, no hicuping or burping, does not feel bloated. He has been passing gas throughout this.     ED workup with stable VS, labs unremarkable with exception of significant hyperglycemia in the 300s, CT showing dilated loop of small bowel with transition point, consistent with SBO. Given IVFs and pain medication, and NGT was placed. The patient states this was in place \"for a couple of hours and nothing coming out, so [he] removed it,\" stating he does not need it.     Past Medical History:  Past Medical History:   Diagnosis Date     AIDS (H)      Allergic rhinitis due to other allergen     DNS     Anal dysplasia      Chronic abdominal pain      CNS toxoplasmosis (H)      Diabetes type 2, controlled (H)      GERD (gastroesophageal reflux disease)      History of seizure      History of substance abuse (H)      HIV (human immunodeficiency virus infection) (H)      HLD (hyperlipidemia)      Lung nodules      Periungual wart      PTSD (post-traumatic stress disorder)      Sleep apnea     doesn't use CPAP       Past Surgical History  Past Surgical History:   Procedure Laterality Date     ANOSCOPY N/A 9/9/2020    Procedure: Exam Under Anesthesia, ANOSCOPY, fulgeration of rectal fissures with Rectal Biopsies;  Surgeon: Thanh Lundberg MD;  Location: [facility] OR     COLONOSCOPY Left 1/22/2016    Procedure: COMBINED COLONOSCOPY, SINGLE OR MULTIPLE BIOPSY/POLYPECTOMY BY BIOPSY;  Surgeon: Clark Saini MD;  Location: [facility] GI     HC EXPLORE UNDESC TESTIS,INGUIN/SCROTAL       LAPAROSCOPIC APPENDECTOMY N/A 1/31/2018    Procedure: LAPAROSCOPIC APPENDECTOMY;  LAPAROSCOPIC APPENDECTOMY;  Surgeon: Dawn Holt MD;  Location: [facility] OR     LAPAROSCOPY DIAGNOSTIC (GENERAL) N/A 7/26/2016    Procedure: LAPAROSCOPY DIAGNOSTIC (GENERAL);  Surgeon: Susannah Arriaga MD;  Location: [facility] OR     LAPAROSCOPY DIAGNOSTIC (GENERAL) N/A 4/16/2018    Procedure: LAPAROSCOPY DIAGNOSTIC (GENERAL);  Diagnostic laparoscopy and lysis " of adhesions;  Surgeon: Prince Dowling MD;  Location: [facility] OR     OPTICAL TRACKING SYSTEM CRANIOTOMY, EXCISE TUMOR, COMBINED Left 4/10/2015    Procedure: COMBINED OPTICAL TRACKING SYSTEM CRANIOTOMY, EXCISE TUMOR;  Surgeon: Mirlande Colmenares MD;  Location: [facility] OR     REPAIR GAMEKEEPER'S THUMB Right 12/2/2016    Procedure: REPAIR LIGAMENT ULNAR COLLATERAL THUMB (GAMEKEEPER'S);  Surgeon: Evin Zamorano MD;  Location:  OR     [facility] NONSPECIFIC PROCEDURE      right forearm fracture       Family History:  Family History   Problem Relation Age of Onset     Diabetes Brother      Diabetes Father      Alzheimer Disease Father      Unknown/Adopted Mother      Diabetes Paternal Grandfather      Cancer No family hx of         no skin cancer     Skin Cancer No family hx of         no famiy hx of skin cancer     Glaucoma No family hx of      Macular Degeneration No family hx of        Social History:  Social History     Socioeconomic History     Marital status: Single     Spouse name: Not on file     Number of children: Not on file     Years of education: Not on file     Highest education level: Not on file   Occupational History     Occupation:      Comment: 5 years; temp agency     Occupation: Unemployed   Tobacco Use     Smoking status: Current Every Day Smoker     Packs/day: 0.50     Years: 40.00     Pack years: 20.00     Types: Cigarettes     Smokeless tobacco: Former User   Substance and Sexual Activity     Alcohol use: No     Alcohol/week: 0.0 standard drinks     Comment: Last etoh in 2007     Drug use: Not Currently     Types: Marijuana, Methamphetamines     Comment: Sober 9 months      Sexual activity: Not Currently     Partners: Female, Male     Comment: Last sexual activity 2008   Other Topics Concern     Parent/sibling w/ CABG, MI or angioplasty before 65F 55M? Not Asked   Social History Narrative    Born in [facility], [place].  Came to the [place] in [place].  Last traveled to visit family in 2008.         1/7/2019 - Homeless. Working with a  at [employer] trying to get housing. Sexually active with two partners recently using condoms 100% of the time. Smokes occasionally, not for the last 4 weeks.        1/7/2020 at [facility] since 1/1/2020. 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  Care established with ID and endocrine     Social Determinants of Health     Financial Resource Strain:      Difficulty of Paying Living Expenses:    Food Insecurity:      Worried About Running Out of Food in the Last Year:      Ran Out of Food in the Last Year:    Transportation Needs:      Lack of Transportation (Medical):      Lack of Transportation (Non-Medical):    Physical Activity:      Days of Exercise per Week:      Minutes of Exercise per Session:    Stress:      Feeling of Stress :    Social Connections:      Frequency of Communication with Friends and Family:      Frequency of Social Gatherings with Friends and Family:      Attends Caoda[employer] Services:      Active Member of Clubs or Organizations:      Attends Club or Organization Meetings:      Marital Status:    Intimate Partner Violence:      Fear of Current or Ex-Partner:      Emotionally Abused:      Physically Abused:      Sexually Abused:        Medications:  Current Outpatient Medications   Medication Sig Dispense Refill     bictegravir-emtricitabine-tenofovir (BIKTARVY) -25 MG per tablet Take 1 tablet by mouth daily 30 tablet 6     glucose 40 % (400 mg/mL) gel 15 g every 15 minutes by mouth as needed for low blood sugar. Oral gel is preferable for conscious and able to swallow patient. 112.5 g 0     insulin detemir (LEVEMIR PEN) 100 UNIT/ML pen Inject 18 Units Subcutaneous 2 times daily 100 mL 0     NOVOLOG FLEXPEN 100 UNIT/ML soln Inject 10 Units Subcutaneous 3 times daily (with meals) 75 mL 0     pantoprazole (PROTONIX) 40 MG EC tablet Take 1 tablet (40 mg) by mouth daily 30 tablet 6     polyethylene glycol (MIRALAX) 17 GM/Dose powder Take 17 g by mouth daily  "as needed 510 g 0     traMADol (ULTRAM) 50 MG tablet Take 1 tablet (50 mg) by mouth every 6 hours as needed for moderate pain 10 tablet 0     Alcohol Swabs PADS Use to swab the area of the injection or todd as directed Per insurance coverage 100 each 0     blood glucose (NO BRAND SPECIFIED) test strip Use to test blood sugar 4 times daily or as directed. To accompany:whatever is covered 360 strip 3     blood glucose calibration (NO BRAND SPECIFIED) solution Used to calibrate the blood glucose monitor as needed and as directed.  To accompany  blood glucose brands per insurance coverage 1 each 0     blood glucose monitoring (NO BRAND SPECIFIED) meter device kit Use as directed. Per insurance coverage. 1 kit 0     ibuprofen (ADVIL/MOTRIN) 800 MG tablet Take 1 tablet (800 mg) by mouth every 6 hours as needed for moderate pain (Patient not taking: Reported on 10/22/2021) 21 tablet 0     insulin pen needle (BD SCOTT U/F) 32G X 4 MM miscellaneous USE 3 TO 4 NEEDLES DAILY 400 each 3     SENNA-docusate sodium (SENNA S) 8.6-50 MG tablet Take 1 tablet by mouth 2 times daily Hold for loose stools. (Patient not taking: Reported on 10/22/2021) 60 tablet 5     Sharps Container MISC Use as directed to dispose of needles, lancets and other sharps. Per Insurance coverage 1 each 0     STATIN NOT PRESCRIBED (INTENTIONAL) Please choose reason not prescribed, below       thin (NO BRAND SPECIFIED) lancets Use with lanceting device. To accompany: Test 4 times daily with whatever is covered 360 each 3       Allergies:  Allergies   Allergen Reactions     Metformin      Abdominal pain     Milk [Lac Bovis] Hives     Tylenol [Acetaminophen] Itching     Dulaglutide Rash     Penicillin V Other (See Comments) and Rash     Diffuse maculopapular rash + feels \"high\", per pt.        Review of Symptoms:  + vision changes (he relates to diabetes)  Complete 10 point review of symptoms otherwise negative unless stated in HPI.     Physical Exam:  Blood " pressure 107/86, pulse 93, temperature 98.7  F (37.1  C), temperature source Oral, resp. rate 18, SpO2 100 %.  Gen:    Awake and alert, lying on gurney, NAD  HEENT: Normocephalic and atraumatic, NGT pinned to shirt no longer in place  CV:  RRR  Pulm:  Non-labored breathing on room air  Abd:  Soft, non-distended, minimal tenderness in left mid-abdomen with deep palpation, otherwise non-tender, no rebound or guarding, laparoscopic surgical scars  Ext:  Warm and well perfused, no obvious deformities    Labs:  CBC RESULTS:   Recent Labs   Lab Test 10/22/21  0600   WBC 8.2   RBC 4.29*   HGB 12.3*   HCT 37.2*   MCV 87   MCH 28.7   MCHC 33.1   RDW 13.0        Last Basic Metabolic Panel:  Lab Results   Component Value Date     10/22/2021     06/24/2021      Lab Results   Component Value Date    POTASSIUM 3.9 10/22/2021    POTASSIUM 3.2 06/24/2021     Lab Results   Component Value Date    CHLORIDE 103 10/22/2021    CHLORIDE 107 06/24/2021     Lab Results   Component Value Date    LINDA 8.5 10/22/2021    LINDA 7.3 06/24/2021     Lab Results   Component Value Date    CO2 25 10/22/2021    CO2 20 06/24/2021     Lab Results   Component Value Date    BUN 12 10/22/2021    BUN 18 06/24/2021     Lab Results   Component Value Date    CR 0.72 10/22/2021    CR 0.81 06/24/2021     Lab Results   Component Value Date     10/22/2021     10/22/2021     06/24/2021       Imaging:  EXAM: CT ABDOMEN PELVIS W CONTRAST  LOCATION: [facility]  DATE/TIME: 10/22/2021 12:43 AM      INDICATION: Left lower quadrant pain. Recurrent bowel obstruction.  COMPARISON: 8/23/2021.  TECHNIQUE: CT scan of the abdomen and pelvis was performed following injection of IV contrast. Multiplanar reformats were obtained. Dose reduction techniques were used.  CONTRAST: 89 ml isovue 370      FINDINGS:   LOWER CHEST: Normal.     HEPATOBILIARY: Normal.     PANCREAS: Normal.     SPLEEN:  Normal.     ADRENAL GLANDS: Normal.     KIDNEYS/BLADDER: Normal.     BOWEL: There is a dilated loop of mid small bowel. Transition point is in the midabdomen anteriorly. Mild mesenteric edema. The stomach is very distended with food debris. No evidence of gastric outlet obstruction. No free intraperitoneal gas or fluid.     LYMPH NODES: Normal.     VASCULATURE: Unremarkable.     PELVIC ORGANS: Penile prosthesis reservoir in the left anterior pelvis.     MUSCULOSKELETAL: Normal.                                                                      IMPRESSION:   1.  Mid small bowel obstruction.

## 2021-10-22 NOTE — ED TRIAGE NOTES
Pt arrives from private residence. Hx of bowel obstructions; pt states that he had an episode of diarrhea yesterday and abdominal discomfort.  en route. Pt immediately requesting vascular access consult for IV placement. Pt has hx of HIV.

## 2021-10-23 VITALS
RESPIRATION RATE: 16 BRPM | BODY MASS INDEX: 30.18 KG/M2 | TEMPERATURE: 99.2 F | DIASTOLIC BLOOD PRESSURE: 78 MMHG | SYSTOLIC BLOOD PRESSURE: 129 MMHG | OXYGEN SATURATION: 97 % | HEART RATE: 83 BPM | WEIGHT: 165 LBS

## 2021-10-23 LAB
ALBUMIN SERPL-MCNC: 2.8 G/DL (ref 3.4–5)
ALP SERPL-CCNC: 100 U/L (ref 40–150)
ALT SERPL W P-5'-P-CCNC: 17 U/L (ref 0–70)
ANION GAP SERPL CALCULATED.3IONS-SCNC: 5 MMOL/L (ref 3–14)
AST SERPL W P-5'-P-CCNC: 14 U/L (ref 0–45)
BILIRUB SERPL-MCNC: 0.9 MG/DL (ref 0.2–1.3)
BUN SERPL-MCNC: 9 MG/DL (ref 7–30)
CALCIUM SERPL-MCNC: 8.8 MG/DL (ref 8.5–10.1)
CHLORIDE BLD-SCNC: 106 MMOL/L (ref 94–109)
CO2 SERPL-SCNC: 26 MMOL/L (ref 20–32)
CREAT SERPL-MCNC: 0.75 MG/DL (ref 0.66–1.25)
ERYTHROCYTE [DISTWIDTH] IN BLOOD BY AUTOMATED COUNT: 12.9 % (ref 10–15)
GFR SERPL CREATININE-BSD FRML MDRD: >90 ML/MIN/1.73M2
GLUCOSE BLD-MCNC: 188 MG/DL (ref 70–99)
GLUCOSE BLDC GLUCOMTR-MCNC: 181 MG/DL (ref 70–99)
GLUCOSE BLDC GLUCOMTR-MCNC: 185 MG/DL (ref 70–99)
GLUCOSE BLDC GLUCOMTR-MCNC: 185 MG/DL (ref 70–99)
GLUCOSE BLDC GLUCOMTR-MCNC: 198 MG/DL (ref 70–99)
GLUCOSE BLDC GLUCOMTR-MCNC: 350 MG/DL (ref 70–99)
HCT VFR BLD AUTO: 36.4 % (ref 40–53)
HGB BLD-MCNC: 12.1 G/DL (ref 13.3–17.7)
MCH RBC QN AUTO: 28.8 PG (ref 26.5–33)
MCHC RBC AUTO-ENTMCNC: 33.2 G/DL (ref 31.5–36.5)
MCV RBC AUTO: 87 FL (ref 78–100)
PLATELET # BLD AUTO: 301 10E3/UL (ref 150–450)
POTASSIUM BLD-SCNC: 3.7 MMOL/L (ref 3.4–5.3)
PROT SERPL-MCNC: 6.4 G/DL (ref 6.8–8.8)
RBC # BLD AUTO: 4.2 10E6/UL (ref 4.4–5.9)
SODIUM SERPL-SCNC: 137 MMOL/L (ref 133–144)
WBC # BLD AUTO: 5.4 10E3/UL (ref 4–11)

## 2021-10-23 PROCEDURE — 120N000002 HC R&B MED SURG/OB UMMC

## 2021-10-23 PROCEDURE — 36415 COLL VENOUS BLD VENIPUNCTURE: CPT | Performed by: STUDENT IN AN ORGANIZED HEALTH CARE EDUCATION/TRAINING PROGRAM

## 2021-10-23 PROCEDURE — 80053 COMPREHEN METABOLIC PANEL: CPT | Performed by: STUDENT IN AN ORGANIZED HEALTH CARE EDUCATION/TRAINING PROGRAM

## 2021-10-23 PROCEDURE — 999N000157 HC STATISTIC RCP TIME EA 10 MIN

## 2021-10-23 PROCEDURE — 258N000003 HC RX IP 258 OP 636: Performed by: STUDENT IN AN ORGANIZED HEALTH CARE EDUCATION/TRAINING PROGRAM

## 2021-10-23 PROCEDURE — 99232 SBSQ HOSP IP/OBS MODERATE 35: CPT | Mod: GC | Performed by: FAMILY MEDICINE

## 2021-10-23 PROCEDURE — 85014 HEMATOCRIT: CPT | Performed by: STUDENT IN AN ORGANIZED HEALTH CARE EDUCATION/TRAINING PROGRAM

## 2021-10-23 PROCEDURE — 250N000013 HC RX MED GY IP 250 OP 250 PS 637: Performed by: STUDENT IN AN ORGANIZED HEALTH CARE EDUCATION/TRAINING PROGRAM

## 2021-10-23 RX ADMIN — SODIUM CHLORIDE, POTASSIUM CHLORIDE, SODIUM LACTATE AND CALCIUM CHLORIDE: 600; 310; 30; 20 INJECTION, SOLUTION INTRAVENOUS at 18:24

## 2021-10-23 RX ADMIN — BICTEGRAVIR SODIUM, EMTRICITABINE, AND TENOFOVIR ALAFENAMIDE FUMARATE 1 TABLET: 50; 200; 25 TABLET ORAL at 10:01

## 2021-10-23 ASSESSMENT — ACTIVITIES OF DAILY LIVING (ADL)
ADLS_ACUITY_SCORE: 5

## 2021-10-23 NOTE — PLAN OF CARE
BP (!) 142/70 (BP Location: Right arm)   Pulse 95   Temp 98.8  F (37.1  C) (Oral)   Resp 16   Wt 74.8 kg (165 lb)   SpO2 97%   BMI 30.18 kg/m      Neuro:  A&Ox4. Able to make needs known.   Cardiac:  WNL.   Respiratory:  sats mid 90s on RA. Denies SOB.    Diet: Regular, tolerating well.   GI/:  +BS, +passing gas, no reports of BM today. Voiding without difficulty.  IV Access: (L) PIV running LR at 100mL/hr   Pain: Denies pain.   Activity: Up independently in room. (R) arm in cast.   Labs: B and 350 (team notified).   Plan: Continue to monitor and follow POC.

## 2021-10-23 NOTE — PROGRESS NOTES
"VSS. When writer entered to assess pt, pt stated multiple times, \"I just want to go to sleep, and pt kept interaction brief. Pt refused bedtime Detemir but did take rapid acting insulin 5 U per orders. Pt also still adamantly refusing NG stating he had \"pooped during the day and am passing gas\". MD made aware. Continue POC   "

## 2021-10-23 NOTE — PLAN OF CARE
/71 (BP Location: Right arm)   Pulse 94   Temp 98.6  F (37  C) (Oral)   Resp 16   SpO2 94%      Neuros:  A&Ox4. Able to make needs known.   Activity: independent in room. R arm in cast.   Cardiac:  WNL.   Respiratory:  WNL. Sating well on RA.   Diet: NPO ex meds  GI/Gu:  Voiding without difficulty.No BM this shift. Passing gas. Denies N/V  Skin/Incisions: refused skin check.   LDA: PIV running LR at 100mL/hr   Pain: Denies  PRN: X  Changes:  No acute changes this shift. Refused 230am BG still refusing NGT  Plan: Continue POC.     VON ANDRES RN on 10/23/2021 at 4:24 AM

## 2021-10-23 NOTE — PROGRESS NOTES
[facility]    Progress Note - Lissy's Service        Date of Admission:  10/21/2021         Plan for today   - PO trial  - Insulin regiment changed as patient is taking PO now    Assessment & Plan   Andrew Lockwood is a 53 year old male admitted on 10/21/2021. He has a history of recurrent small bowel obstructions of unknown etiology, HIV/AIDS on HAART therapy (viral load undetectable), diabetes mellitus type 2 on insulin, CNS toxoplasmosis (resolved c/b cognitive issues), and hx of housing insecurity (currently with housing) who presented with abdominal pain and was found to have small bowel obstruction.     # Acute on recurrent SBOs of unknown etiology  Hx of recurrent small bowel obstructions (starting in 2016, most recently 3/7/20, 11/11/20 and last 7/13/21) with hx of appendectomy and 2 laparoscopies with lysis of adhesions (last 04/2018). Pt with new onset abdominal pain 10/21 found to have SBO with transitional point at mid abdomen and notable stool burden on CT 10/22. NGT placed 10/22, patient self removed. Received 1 pink lady enema with good results. Patient reporting feeling better, but after PO trial with jello and broth in the evening, pain return. He refused NG, but passing gas with improvement of abdominal pain. This AM, abdominal pain resolved, normal abdominal exam.  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 Has been on Biktarvy since July 2019. Follows with ID, last seen by Dr. Ivory on 8/24/2021. Last CD4 count 8/24/2021 519 with undetectable viral load. Reports compliance with Biktarvy. L ELIN was diagnosed June 2018. Follows with colorectal but anoscopy exams have been inconsistent. Last evaluated 9/2020 with anoscopy and  fulguration found to have continued LGSIL and HGSIL in one region of the anus. No invasive malignancy noted.  -Continue PTA Biktarvy -25 mg daily once able to tolerate PO   -Possible CRS follow-up at discharge     # DM2 on insulin with hyperglycemia  Hemoglobin A1c 10.3 (8/24/21), 10.6 (10/21/21).Home regiment: Levemir 18 BID, pre-prandial insulin to 20U with meals TID. Last 24 hours: BS ranging 165-255. Received 13 aspart. Refused last night's levemir.  - Levemir 18 Units at bedtime -> BID  - POCT glucose TID with meals and at bedtime and high sliding scale insulin  - Prandial novolog 10 U     Diet: NPO for Medical/Clinical Reasons Except for: Meds    DVT Prophylaxis: Pneumatic Compression Devices  Potts Catheter: Not present  Fluids: 125 ml/hr LR  Central Lines: None  Code Status: Full Code      Disposition Plan   Expected discharge: 10/25/2021   recommended to prior living arrangement once adequate pain management/ tolerating PO medications and safe disposition plan/ TCU bed available.     The patient's care was discussed with the Attending Physician, Dr. Good.    Rianna Patel MD  [facility]  Securely message with the [employer] Console (learn more here)  Text page via [employer] Paging/Directory    ______________________________________________________________________    Interval History   Patient sleeping comfortable. When woken up, he denies abdominal pain. States everything feels fine. Passing gas.    Data reviewed today: I reviewed all medications, new labs and imaging results over the last 24 hours. I personally reviewed the abdominal x-ray image(s) showing NG with good placement and the abdominal CT image(s) showing dilated loop of mid small bowel with transition point in mid abdomen anteriorly.    Physical Exam   Vital Signs: Temp: 98.6  F (37  C) Temp src: Oral BP: 126/71 Pulse: 94   Resp: 16 SpO2: 94 % O2 Device: None (Room air)     Weight: 0 lbs 0 oz  Constitutional: awake, alert, cooperative, no apparent distress, and appears stated age  Respiratory: No increased work of breathing, good air exchange, clear to auscultation bilaterally, no crackles or wheezing  Cardiovascular: normal S1 and S2, no murmur noted and no edema  GI: several surgical scars noted, soft, non distended, normal bowel sounds x4, non tender to palpation, no rebound, no guarding  Neurologic: Alert, at baseline    Data   Recent Labs   Lab 10/23/21  0701 10/23/21  0639 10/23/21  0636 10/22/21  0829 10/22/21  0600 10/22/21  0216 10/21/21  2345   WBC 5.4  --   --   --  8.2  --  8.7   HGB 12.1*  --   --   --  12.3*  --  13.6   MCV 87  --   --   --  87  --  85     --   --   --  341  --  423     --   --   --  133  --  134   POTASSIUM 3.7  --   --   --  3.9  --  4.4   CHLORIDE 106  --   --   --  103  --  101   CO2 26  --   --   --  25  --  28   BUN 9  --   --   --  12  --  16   CR 0.75  --   --   --  0.72  --  0.76   ANIONGAP 5  --   --   --  5  --  5   LINDA 8.8  --   --   --  8.5  --  9.4   * 181* 185*   < > 309*   < > 356*   ALBUMIN 2.8*  --   --   --  3.2*   < > 3.6   PROTTOTAL 6.4*  --   --   --  6.5*   < > 7.9   BILITOTAL 0.9  --   --   --  0.6   < > 0.7   ALKPHOS 100  --   --   --  111   < > 141   ALT 17  --   --   --  16   < > 24   AST 14  --   --   --  5   < > 26   LIPASE  --   --   --   --   --   --  221    < > = values in this interval not displayed.     No results found for this or any previous visit (from the past 24 hour(s)).  Medications     lactated ringers 100 mL/hr at 10/22/21 2205       bictegravir-emtricitabine-tenofovir  1 tablet Oral Daily     insulin aspart  1-12 Units Subcutaneous Q4H     insulin detemir  18 Units Subcutaneous At Bedtime     LORazepam  0.5 mg Intravenous Once     [Held by provider] pantoprazole  40 mg Oral Daily     sodium chloride (PF)  3 mL Intracatheter Q8H

## 2021-10-23 NOTE — PROVIDER NOTIFICATION
Purpose of Notification: BG  Notified Person: Frankie  Notification Time: 1754  Notification Interaction: Paged      FYI- pts BG is 350. Just gave 9 units based on sliding scale. Will give another 10 units (per order) when pt eats dinner. Thanks, Jacquelin [phone]

## 2021-10-23 NOTE — DISCHARGE SUMMARY
[facility]    Family Medicine Discharge Summary- Lissy's  Service    Date of Admission:  10/21/2021  Date of Service: 10/24/2021  Date of Discharge:  No discharge date for patient encounter.  Discharging Attending Provider: Dr. Good  Discharge Team: Lissy's    Discharge Diagnoses      Small bowel obstruction (H)  SBO (small bowel obstruction) (H)  Anal dysplasia    Patient eloped from the hospital prior to discussion with physician overnight.     Follow-ups Needed After Discharge   - follow up with PCP for hospital follow up  - return to the ED for return of symptoms.    Hospital Course   Andrew Lockwood who has a history of recurrent small bowel obstructions of unknown etiology, HIV/AIDS on HAART therapy (viral load undetectable), diabetes mellitus type 2 on insulin, CNS toxoplasmosis (resolved c/b cognitive issues), and hx of housing insecurity (currently with housing) was admitted on 10/21/2021 for small bowel obstruction.  The following problems were addressed during his hospitalization:    # Acute on recurrent SBOs of unknown etiology  Patient has a history of recurrent small bowel obstructions (starting in 2016, most recently 3/7/20, 11/11/20 and last 7/13/21) with a history of appendectomy and 2 laparoscopies with lysis of adhesions (last 04/2018). Historically had been followed by colorectal surgery and gastroenterology teams with multidisciplinary work-up for recurrent SBO of unknown etiology. Patient was lost to follow-up around 2020 when considering repeat MRE in the course of his work-up. GI was consulted during this inpatient admission and has placed referral for outpatient GI follow up SBO, possible MRE, repeat EGD/colonoscopy    On this admission, patient had new onset abdominal pain that was consistent with previous SBOs. On CT scan, he was found to have SBO with transitional point at mid abdomen and notable stool burden. GI and CRS/EGS consulted,  recommending bowel rest and NG decompression. After bowel rest, short interval of NG tube, and pink lady enema, patient's pain resolved and abdomen exam revealed soft abdomen with normal bowel sounds and no tenderness. Patient tolerated diet overnight, left after requesting insulin, prior to discussion with resident on call.      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 Has been on Biktarvy since July 2019. Follows with ID, last seen by Dr. Ivory on 8/24/2021. Last CD4 count 8/24/2021 519 with undetectable viral load. Reports compliance with Biktarvy. L ELIN was diagnosed June 2018. Follows with colorectal with Dr. Lundberg but anoscopy exams have been inconsistent. Last evaluated in 9/2020 with anoscopy and fulguration found to have continued LGSIL and HGSIL in one region of the anus. No invasive malignancy noted. Colorectal was consulted during this admission and routine recommend follow up with Dr. Lundberg.     # Discharge Pain Plan:   - Patient currently has NO PAIN and is not being prescribed pain medications on discharge.    Consultations This Hospital Stay   VASCULAR ACCESS CARE ADULT IP CONSULT  CARE MANAGEMENT / SOCIAL WORK IP CONSULT  GI LUMINAL ADULT IP CONSULT  COLORECTAL SURGERY ADULT IP CONSULT  SURGERY GENERAL ADULT IP CONSULT  CARE MANAGEMENT / SOCIAL WORK IP CONSULT    Code Status   Full Code       The patient was discussed with Dr. Latisha Yuan's Family Medicine Inpatient Service  [facility]   Pager:[phone]_  ___________________________________________________________________  Review of Systems:  Unable to be completed due to patient eloping.     Physical Exam   Vital Signs: Temp: 99.2  F (37.3  C) Temp src: Oral BP: 129/78 Pulse: 83   Resp: 16 SpO2: 97 % O2 Device: None (Room air)    Weight: 165 lbs 0 oz    Unable to be completed due to patient eloping.     Significant Results and  Procedures   Most Recent 3 CBC's:  Recent Labs   Lab Test 10/23/21  0701 10/22/21  0600 10/21/21  2345   WBC 5.4 8.2 8.7   HGB 12.1* 12.3* 13.6   MCV 87 87 85    341 423     Most Recent 3 BMP's:  Recent Labs   Lab Test 10/24/21  0200 10/23/21  2020 10/23/21  1720 10/23/21  1004 10/23/21  0701 10/22/21  0829 10/22/21  0600 10/22/21  0216 10/21/21  2345   NA  --   --   --   --  137  --  133  --  134   POTASSIUM  --   --   --   --  3.7  --  3.9  --  4.4   CHLORIDE  --   --   --   --  106  --  103  --  101   CO2  --   --   --   --  26  --  25  --  28   BUN  --   --   --   --  9  --  12  --  16   CR  --   --   --   --  0.75  --  0.72  --  0.76   ANIONGAP  --   --   --   --  5  --  5  --  5   LINDA  --   --   --   --  8.8  --  8.5  --  9.4   * 198* 350*   < > 188*   < > 309*   < > 356*    < > = values in this interval not displayed.     Most Recent 2 LFT's:  Recent Labs   Lab Test 10/23/21  0701 10/22/21  0600   AST 14 5   ALT 17 16   ALKPHOS 100 111   BILITOTAL 0.9 0.6     Most Recent 6 Bacteria Isolates From Any Culture (See EPIC Reports for Culture Details):  Recent Labs   Lab Test 09/26/20  0837 09/24/20  2317 09/24/20 2005 09/24/20  1859 09/24/20  1857 02/25/20  1248   CULT No growth  No growth Moderate growth  Normal sushma   No growth No growth No growth Moderate growth  Methicillin resistant Staphylococcus aureus (MRSA)  *     Most Recent ESR & CRP:  Recent Labs   Lab Test 10/22/21  0600 12/19/20  2153   SED  --  10   CRP <2.9 7.2   ,   Results for orders placed or performed during the hospital encounter of 10/21/21   CT Abdomen Pelvis w Contrast    Narrative    EXAM: CT ABDOMEN PELVIS W CONTRAST  LOCATION: [facility]  DATE/TIME: 10/22/2021 12:43 AM    INDICATION: Left lower quadrant pain. Recurrent bowel obstruction.  COMPARISON: 8/23/2021.  TECHNIQUE: CT scan of the abdomen and pelvis was performed following injection of IV contrast. Multiplanar  reformats were obtained. Dose reduction techniques were used.  CONTRAST: 89 ml isovue 370     FINDINGS:   LOWER CHEST: Normal.    HEPATOBILIARY: Normal.    PANCREAS: Normal.    SPLEEN: Normal.    ADRENAL GLANDS: Normal.    KIDNEYS/BLADDER: Normal.    BOWEL: There is a dilated loop of mid small bowel. Transition point is in the midabdomen anteriorly. Mild mesenteric edema. The stomach is very distended with food debris. No evidence of gastric outlet obstruction. No free intraperitoneal gas or fluid.    LYMPH NODES: Normal.    VASCULATURE: Unremarkable.    PELVIC ORGANS: Penile prosthesis reservoir in the left anterior pelvis.    MUSCULOSKELETAL: Normal.      Impression    IMPRESSION:   1.  Mid small bowel obstruction.   XR Abdomen Port 1 View    Narrative    EXAM: XR ABDOMEN PORT 1 VIEWS  LOCATION: [facility]  DATE/TIME: 10/22/2021 5:31 AM    INDICATION: NG placement  COMPARISON: None.      Impression    IMPRESSION: NG tube in the stomach with tip at the gastric fundus.      *Note: Due to a large number of results and/or encounters for the requested time period, some results have not been displayed. A complete set of results can be found in Results Review.       Pending Results   These results will be followed up by n/a  Unresulted Labs Ordered in the Past 30 Days of this Admission     No orders found from 9/21/2021 to 10/22/2021.             Primary Care Physician   Physician No Ref-Primary    Discharge Disposition   Discharged to home  Condition at discharge: Stable    Discharge Orders     Discharge Medications   Current Discharge Medication List      CONTINUE these medications which have NOT CHANGED    Details   bictegravir-emtricitabine-tenofovir (BIKTARVY) -25 MG per tablet Take 1 tablet by mouth daily  Qty: 30 tablet, Refills: 6    Associated Diagnoses: Human immunodeficiency virus (HIV) disease (H)      glucose 40 % (400 mg/mL) gel 15 g every 15 minutes by  mouth as needed for low blood sugar. Oral gel is preferable for conscious and able to swallow patient.  Qty: 112.5 g, Refills: 0    Associated Diagnoses: Type 2 diabetes mellitus with hyperglycemia, with long-term current use of insulin (H)      insulin detemir (LEVEMIR PEN) 100 UNIT/ML pen Inject 18 Units Subcutaneous 2 times daily  Qty: 100 mL, Refills: 0    Associated Diagnoses: Type 2 diabetes mellitus with hyperglycemia, with long-term current use of insulin (H)      NOVOLOG FLEXPEN 100 UNIT/ML soln Inject 10 Units Subcutaneous 3 times daily (with meals)  Qty: 75 mL, Refills: 0    Associated Diagnoses: Type 2 diabetes mellitus with hyperglycemia, with long-term current use of insulin (H)      pantoprazole (PROTONIX) 40 MG EC tablet Take 1 tablet (40 mg) by mouth daily  Qty: 30 tablet, Refills: 6    Associated Diagnoses: Gastroesophageal reflux disease with esophagitis without hemorrhage      polyethylene glycol (MIRALAX) 17 GM/Dose powder Take 17 g by mouth daily as needed  Qty: 510 g, Refills: 0    Associated Diagnoses: Closed fracture of distal ends of right radius and ulna with routine healing, subsequent encounter      traMADol (ULTRAM) 50 MG tablet Take 1 tablet (50 mg) by mouth every 6 hours as needed for moderate pain  Qty: 10 tablet, Refills: 0    Associated Diagnoses: Closed fracture of distal ends of right radius and ulna with routine healing, subsequent encounter      Alcohol Swabs PADS Use to swab the area of the injection or todd as directed Per insurance coverage  Qty: 100 each, Refills: 0    Associated Diagnoses: Type 2 diabetes mellitus with hyperglycemia, with long-term current use of insulin (H)      blood glucose (NO BRAND SPECIFIED) test strip Use to test blood sugar 4 times daily or as directed. To accompany:whatever is covered  Qty: 360 strip, Refills: 3    Associated Diagnoses: Type 2 diabetes mellitus with other specified complication, with long-term current use of insulin (H)       blood glucose calibration (NO BRAND SPECIFIED) solution Used to calibrate the blood glucose monitor as needed and as directed.  To accompany  blood glucose brands per insurance coverage  Qty: 1 each, Refills: 0    Associated Diagnoses: Type 2 diabetes mellitus with hyperglycemia, with long-term current use of insulin (H)      blood glucose monitoring (NO BRAND SPECIFIED) meter device kit Use as directed. Per insurance coverage.  Qty: 1 kit, Refills: 0    Associated Diagnoses: Type 2 diabetes mellitus with hyperglycemia, with long-term current use of insulin (H)      ibuprofen (ADVIL/MOTRIN) 800 MG tablet Take 1 tablet (800 mg) by mouth every 6 hours as needed for moderate pain  Qty: 21 tablet, Refills: 0    Associated Diagnoses: Type 2 diabetes mellitus with hyperglycemia, with long-term current use of insulin (H)      insulin pen needle (BD SCOTT U/F) 32G X 4 MM miscellaneous USE 3 TO 4 NEEDLES DAILY  Qty: 400 each, Refills: 3    Associated Diagnoses: Type 2 diabetes mellitus with hyperglycemia, without long-term current use of insulin (H)      SENNA-docusate sodium (SENNA S) 8.6-50 MG tablet Take 1 tablet by mouth 2 times daily Hold for loose stools.  Qty: 60 tablet, Refills: 5    Associated Diagnoses: Constipation, unspecified constipation type      Sharps Container MISC Use as directed to dispose of needles, lancets and other sharps. Per Insurance coverage  Qty: 1 each, Refills: 0    Associated Diagnoses: Type 2 diabetes mellitus with hyperglycemia, with long-term current use of insulin (H)      STATIN NOT PRESCRIBED (INTENTIONAL) Please choose reason not prescribed, below    Associated Diagnoses: Type 2 diabetes mellitus with complication, without long-term current use of insulin (H)      thin (NO BRAND SPECIFIED) lancets Use with lanceting device. To accompany: Test 4 times daily with whatever is covered  Qty: 360 each, Refills: 3    Associated Diagnoses: Type 2 diabetes mellitus with other specified  "complication, with long-term current use of insulin (H)           Allergies   Allergies   Allergen Reactions     Metformin      Abdominal pain     Tylenol [Acetaminophen] Itching     Dulaglutide Rash     Penicillin V Other (See Comments) and Rash     Diffuse maculopapular rash + feels \"high\", per pt.        "

## 2021-10-24 LAB — GLUCOSE BLDC GLUCOMTR-MCNC: 362 MG/DL (ref 70–99)

## 2021-10-24 RX ORDER — PANTOPRAZOLE SODIUM 40 MG/1
40 TABLET, DELAYED RELEASE ORAL DAILY
Status: DISCONTINUED | OUTPATIENT
Start: 2021-10-24 | End: 2021-10-24 | Stop reason: HOSPADM

## 2021-10-24 ASSESSMENT — ACTIVITIES OF DAILY LIVING (ADL)
ADLS_ACUITY_SCORE: 5

## 2021-10-24 NOTE — PROGRESS NOTES
Event Note    Time of event: 2:18 AM October 24, 2021    Description:  Notified by bedside RN that patient requesting insulin after BG of 362 after intake of a boxed courtesy lunch. Pt also requesting acid reflux medication at that time. In the course of responding to the page, discussing the page with the bedside RN, and placing insulin orders, Pt reportedly picked up belongings and ran eloping off the unit. A code 21 was called. The patient eloped from the hospital.    Discussed with: Bedside RN and 7B charge RN.    Radha Estrada DO

## 2021-10-24 NOTE — PROGRESS NOTES
At approximately pt called and demanded some acid reflux medication that he has scheduled, nurse went thru MAR and told pt there is no medication but will paged the provider for an order. Pt then asked to have BS check. Pt had some courtesy meal within an hour, nurse educate pt about BS level being high after a meal. But pt screamed and requested BS to be taken immediately. BS @0200 362, pt demanded to have insulin, nurse told pt provider will be page for a PRN insulin. Pt kept insisting nurse should get his insulin now. Nurse stepped out of pt room to page provider. Provider placed order in @ 0208 for 6unit of Novolog and protonix for acid reflux. While on the phone with provider pt had gathered belongings and sprint toward the elevator. Pt was unable to be stopped, security was called. Has not emergency contact to update. Charge and house supervisor updated.

## 2021-10-24 NOTE — PROGRESS NOTES
"Pt eloped from unit 7B ~ 0215 after becoming extremely agitated about not being able to receive Insulin administration right away and proceeded to walk briskly out of unit and take elevator and leave hospital. After failed de- escalation and pt stating multiple time, \"just let me leave, you guys are not stopping me\", writer presumed to ask pt to remove IV if pt was to leave w/out signing AMA paperwork. Pt was last seen walking [place] down [place] campus, writer uncertain as to where pt was heading. Will keep pt bed saved until ~ 1000 @ which point will discharge from system. Plan discussed w/ Jackeline, Nursing Supervisor   "

## 2021-10-25 ENCOUNTER — TELEPHONE (OUTPATIENT)
Dept: GASTROENTEROLOGY | Facility: CLINIC | Age: 56
End: 2021-10-25

## 2021-10-25 ENCOUNTER — TELEPHONE (OUTPATIENT)
Dept: SURGERY | Facility: CLINIC | Age: 56
End: 2021-10-25

## 2021-10-25 NOTE — TELEPHONE ENCOUNTER
LVM with pod number, per clinic pt to schedule 2 appts:  With: Dr. Thanh Lundberg   For / Appt Notes: follow up SBO   Appt Type: UMP Return   **CAN USE NCA SLOT ON 11/2 PER RN**    With: Carrie Mary NP   For / Appt Notes: HRA   Appt Type: UMP Return and UMP Mirco   Appt Date/Time: Next available thurs from 11-3pm     No ibrahima.

## 2021-10-25 NOTE — TELEPHONE ENCOUNTER
Caller: Writer called and left message for patient to return call.    Procedure: EGD/COLON    Ordering Provider: Enrique Glass APRN CNP    Any Staff messages sent regarding case?: Yes  ----- Message from VIKASH Marte CNP sent at 10/22/2021  4:16 PM CDT -----  Regarding: scopes and clinic f/u  Hello,    Could you please assist in schedulin. Clinic: Needs to establish care at [facility], ideally with Jesse or Enrique in next month    Procedure: egd/colon  Physician: Enrique/Jesse/any  Timing: Next 4 weeks  Location/anesthesia: cs  Dx: Recurrent SBOs of unclear etiology    Comments:  Reference 10/22/21 [facility] GI consult note    Thanks,  Enrique Glass  CNP  [phone]        OUTCOME: 1st attempt, Close the loop

## 2021-10-26 ENCOUNTER — PATIENT OUTREACH (OUTPATIENT)
Dept: GASTROENTEROLOGY | Facility: CLINIC | Age: 56
End: 2021-10-26

## 2021-10-27 ENCOUNTER — PATIENT OUTREACH (OUTPATIENT)
Dept: GASTROENTEROLOGY | Facility: CLINIC | Age: 56
End: 2021-10-27

## 2021-10-27 NOTE — PROGRESS NOTES
Left a message for Andrew to call to schedule an appointment.   No return call received   Contact information provided

## 2021-10-30 ENCOUNTER — HOSPITAL ENCOUNTER (EMERGENCY)
Facility: CLINIC | Age: 56
Discharge: HOME | End: 2021-10-30
Attending: EMERGENCY MEDICINE | Admitting: EMERGENCY MEDICINE
Payer: COMMERCIAL

## 2021-10-30 ENCOUNTER — APPOINTMENT (OUTPATIENT)
Dept: GENERAL RADIOLOGY | Facility: CLINIC | Age: 56
End: 2021-10-30
Attending: EMERGENCY MEDICINE
Payer: COMMERCIAL

## 2021-10-30 VITALS
HEART RATE: 80 BPM | RESPIRATION RATE: 16 BRPM | DIASTOLIC BLOOD PRESSURE: 73 MMHG | TEMPERATURE: 98.1 F | HEIGHT: 64 IN | BODY MASS INDEX: 27.83 KG/M2 | WEIGHT: 163 LBS | OXYGEN SATURATION: 100 % | SYSTOLIC BLOOD PRESSURE: 125 MMHG

## 2021-10-30 DIAGNOSIS — B20 HUMAN IMMUNODEFICIENCY VIRUS (HIV) DISEASE (H): ICD-10-CM

## 2021-10-30 DIAGNOSIS — R10.13 ABDOMINAL PAIN, EPIGASTRIC: ICD-10-CM

## 2021-10-30 DIAGNOSIS — E11.649 DIABETIC HYPOGLYCEMIA (H): ICD-10-CM

## 2021-10-30 LAB
ALBUMIN SERPL-MCNC: 3 G/DL (ref 3.4–5)
ALP SERPL-CCNC: 135 U/L (ref 40–150)
ALT SERPL W P-5'-P-CCNC: 20 U/L (ref 0–70)
ANION GAP SERPL CALCULATED.3IONS-SCNC: 6 MMOL/L (ref 3–14)
AST SERPL W P-5'-P-CCNC: 15 U/L (ref 0–45)
ATRIAL RATE - MUSE: 83 BPM
BASOPHILS # BLD AUTO: 0 10E3/UL (ref 0–0.2)
BASOPHILS NFR BLD AUTO: 1 %
BILIRUB SERPL-MCNC: 0.4 MG/DL (ref 0.2–1.3)
BUN SERPL-MCNC: 10 MG/DL (ref 7–30)
CALCIUM SERPL-MCNC: 8.4 MG/DL (ref 8.5–10.1)
CHLORIDE BLD-SCNC: 106 MMOL/L (ref 94–109)
CO2 SERPL-SCNC: 25 MMOL/L (ref 20–32)
CREAT SERPL-MCNC: 0.67 MG/DL (ref 0.66–1.25)
DIASTOLIC BLOOD PRESSURE - MUSE: NORMAL MMHG
EOSINOPHIL # BLD AUTO: 0.2 10E3/UL (ref 0–0.7)
EOSINOPHIL NFR BLD AUTO: 3 %
ERYTHROCYTE [DISTWIDTH] IN BLOOD BY AUTOMATED COUNT: 13 % (ref 10–15)
GFR SERPL CREATININE-BSD FRML MDRD: >90 ML/MIN/1.73M2
GLUCOSE BLD-MCNC: 191 MG/DL (ref 70–99)
HCT VFR BLD AUTO: 35.8 % (ref 40–53)
HGB BLD-MCNC: 11.9 G/DL (ref 13.3–17.7)
IMM GRANULOCYTES # BLD: 0 10E3/UL
IMM GRANULOCYTES NFR BLD: 1 %
INTERPRETATION ECG - MUSE: NORMAL
LIPASE SERPL-CCNC: 238 U/L (ref 73–393)
LYMPHOCYTES # BLD AUTO: 1.8 10E3/UL (ref 0.8–5.3)
LYMPHOCYTES NFR BLD AUTO: 30 %
MCH RBC QN AUTO: 28.7 PG (ref 26.5–33)
MCHC RBC AUTO-ENTMCNC: 33.2 G/DL (ref 31.5–36.5)
MCV RBC AUTO: 87 FL (ref 78–100)
MONOCYTES # BLD AUTO: 0.7 10E3/UL (ref 0–1.3)
MONOCYTES NFR BLD AUTO: 11 %
NEUTROPHILS # BLD AUTO: 3.4 10E3/UL (ref 1.6–8.3)
NEUTROPHILS NFR BLD AUTO: 54 %
NRBC # BLD AUTO: 0 10E3/UL
NRBC BLD AUTO-RTO: 0 /100
P AXIS - MUSE: 42 DEGREES
PLATELET # BLD AUTO: 312 10E3/UL (ref 150–450)
POTASSIUM BLD-SCNC: 3.4 MMOL/L (ref 3.4–5.3)
PR INTERVAL - MUSE: 136 MS
PROT SERPL-MCNC: 6.8 G/DL (ref 6.8–8.8)
QRS DURATION - MUSE: 88 MS
QT - MUSE: 370 MS
QTC - MUSE: 434 MS
R AXIS - MUSE: -14 DEGREES
RBC # BLD AUTO: 4.14 10E6/UL (ref 4.4–5.9)
SODIUM SERPL-SCNC: 137 MMOL/L (ref 133–144)
SYSTOLIC BLOOD PRESSURE - MUSE: NORMAL MMHG
T AXIS - MUSE: 18 DEGREES
TROPONIN I SERPL-MCNC: <0.015 UG/L (ref 0–0.04)
VENTRICULAR RATE- MUSE: 83 BPM
WBC # BLD AUTO: 6.2 10E3/UL (ref 4–11)

## 2021-10-30 PROCEDURE — 85025 COMPLETE CBC W/AUTO DIFF WBC: CPT | Performed by: EMERGENCY MEDICINE

## 2021-10-30 PROCEDURE — 82040 ASSAY OF SERUM ALBUMIN: CPT | Performed by: EMERGENCY MEDICINE

## 2021-10-30 PROCEDURE — 250N000013 HC RX MED GY IP 250 OP 250 PS 637: Performed by: EMERGENCY MEDICINE

## 2021-10-30 PROCEDURE — 83690 ASSAY OF LIPASE: CPT | Performed by: EMERGENCY MEDICINE

## 2021-10-30 PROCEDURE — 74019 RADEX ABDOMEN 2 VIEWS: CPT

## 2021-10-30 PROCEDURE — 258N000003 HC RX IP 258 OP 636: Performed by: EMERGENCY MEDICINE

## 2021-10-30 PROCEDURE — 74019 RADEX ABDOMEN 2 VIEWS: CPT | Mod: 26 | Performed by: RADIOLOGY

## 2021-10-30 PROCEDURE — 99285 EMERGENCY DEPT VISIT HI MDM: CPT | Mod: 25

## 2021-10-30 PROCEDURE — 71046 X-RAY EXAM CHEST 2 VIEWS: CPT | Mod: 26 | Performed by: RADIOLOGY

## 2021-10-30 PROCEDURE — 99285 EMERGENCY DEPT VISIT HI MDM: CPT | Mod: 25 | Performed by: EMERGENCY MEDICINE

## 2021-10-30 PROCEDURE — 93005 ELECTROCARDIOGRAM TRACING: CPT

## 2021-10-30 PROCEDURE — 84484 ASSAY OF TROPONIN QUANT: CPT | Performed by: EMERGENCY MEDICINE

## 2021-10-30 PROCEDURE — 93010 ELECTROCARDIOGRAM REPORT: CPT | Performed by: EMERGENCY MEDICINE

## 2021-10-30 PROCEDURE — 36415 COLL VENOUS BLD VENIPUNCTURE: CPT | Performed by: EMERGENCY MEDICINE

## 2021-10-30 PROCEDURE — 96361 HYDRATE IV INFUSION ADD-ON: CPT

## 2021-10-30 PROCEDURE — 96360 HYDRATION IV INFUSION INIT: CPT

## 2021-10-30 PROCEDURE — 71046 X-RAY EXAM CHEST 2 VIEWS: CPT

## 2021-10-30 PROCEDURE — 250N000009 HC RX 250: Performed by: EMERGENCY MEDICINE

## 2021-10-30 RX ORDER — SODIUM CHLORIDE 9 MG/ML
INJECTION, SOLUTION INTRAVENOUS CONTINUOUS
Status: DISCONTINUED | OUTPATIENT
Start: 2021-10-30 | End: 2021-10-30 | Stop reason: HOSPADM

## 2021-10-30 RX ADMIN — LIDOCAINE HYDROCHLORIDE 30 ML: 20 SOLUTION ORAL; TOPICAL at 04:34

## 2021-10-30 RX ADMIN — SODIUM CHLORIDE 1000 ML: 9 INJECTION, SOLUTION INTRAVENOUS at 04:36

## 2021-10-30 ASSESSMENT — MIFFLIN-ST. JEOR: SCORE: 1485.36

## 2021-10-30 NOTE — ED PROVIDER NOTES
ED Provider Note  [facility]      History     Chief Complaint   Patient presents with     Abdominal Pain     Hypoglycemia     HPI  Andrew Lockwood is a 53 year old male who has a past medical history of bowel obstructions, type 2 diabetes on insulin, status post appendectomy, GERD presenting with low blood sugars and heartburn.  The patient states that when his sugars go low he gets heartburn.  He does not think he gave himself too much insulin, no thoughts of harming himself.  This happens occasionally according to the patient.  He says he has been eating a little bit less lately.  He otherwise has been having bowel movements but is concerned about bowel obstruction.  He is passing gas.  No abdominal pain, just burning in his chest.  No shortness of breath.  No leg pain or swelling.  No trouble lying down with regards to shortness of breath.  Denies headache or neck stiffness.  No vomiting.  Patient was given D50 prior to arrival.  No seizures or loss of consciousness    Past Medical History  Past Medical History:   Diagnosis Date     AIDS (H)      Allergic rhinitis due to other allergen     DNS     Anal dysplasia      Chronic abdominal pain      CNS toxoplasmosis (H)      Diabetes type 2, controlled (H)      GERD (gastroesophageal reflux disease)      History of seizure      History of substance abuse (H)      HIV (human immunodeficiency virus infection) (H)      HLD (hyperlipidemia)      Lung nodules      Periungual wart      PTSD (post-traumatic stress disorder)      Sleep apnea     doesn't use CPAP     Past Surgical History:   Procedure Laterality Date     ANOSCOPY N/A 9/9/2020    Procedure: Exam Under Anesthesia, ANOSCOPY, fulgeration of rectal fissures with Rectal Biopsies;  Surgeon: Thanh Lundberg MD;  Location: [facility] OR     COLONOSCOPY Left 1/22/2016    Procedure: COMBINED COLONOSCOPY, SINGLE OR MULTIPLE BIOPSY/POLYPECTOMY BY BIOPSY;  Surgeon: Clark Saini MD;  Location:   GI     HC EXPLORE UNDESC TESTIS,INGUIN/SCROTAL       LAPAROSCOPIC APPENDECTOMY N/A 1/31/2018    Procedure: LAPAROSCOPIC APPENDECTOMY;  LAPAROSCOPIC APPENDECTOMY;  Surgeon: Dawn Holt MD;  Location: [facility] OR     LAPAROSCOPY DIAGNOSTIC (GENERAL) N/A 7/26/2016    Procedure: LAPAROSCOPY DIAGNOSTIC (GENERAL);  Surgeon: Susannah Arriaga MD;  Location: [facility] OR     LAPAROSCOPY DIAGNOSTIC (GENERAL) N/A 4/16/2018    Procedure: LAPAROSCOPY DIAGNOSTIC (GENERAL);  Diagnostic laparoscopy and lysis of adhesions;  Surgeon: Prince Dowling MD;  Location: [facility] OR     OPTICAL TRACKING SYSTEM CRANIOTOMY, EXCISE TUMOR, COMBINED Left 4/10/2015    Procedure: COMBINED OPTICAL TRACKING SYSTEM CRANIOTOMY, EXCISE TUMOR;  Surgeon: Mirlande Colmenares MD;  Location: [facility] OR     REPAIR GAMEKEEPER'S THUMB Right 12/2/2016    Procedure: REPAIR LIGAMENT ULNAR COLLATERAL THUMB (GAMEKEEPER'S);  Surgeon: Evin Zamorano MD;  Location: [facility] OR     ZZC NONSPECIFIC PROCEDURE      right forearm fracture     Alcohol Swabs PADS  bictegravir-emtricitabine-tenofovir (BIKTARVY) -25 MG per tablet  blood glucose (NO BRAND SPECIFIED) test strip  blood glucose calibration (NO BRAND SPECIFIED) solution  blood glucose monitoring (NO BRAND SPECIFIED) meter device kit  glucose 40 % (400 mg/mL) gel  ibuprofen (ADVIL/MOTRIN) 800 MG tablet  insulin detemir (LEVEMIR PEN) 100 UNIT/ML pen  insulin pen needle (BD SCOTT U/F) 32G X 4 MM miscellaneous  NOVOLOG FLEXPEN 100 UNIT/ML soln  pantoprazole (PROTONIX) 40 MG EC tablet  polyethylene glycol (MIRALAX) 17 GM/Dose powder  SENNA-docusate sodium (SENNA S) 8.6-50 MG tablet  Sharps Container MISC  STATIN NOT PRESCRIBED (INTENTIONAL)  thin (NO BRAND SPECIFIED) lancets  traMADol (ULTRAM) 50 MG tablet      Allergies   Allergen Reactions     Metformin      Abdominal pain     Tylenol [Acetaminophen] Itching     Dulaglutide Rash     Penicillin V Other (See Comments) and Rash     Diffuse maculopapular rash +  "feels \"high\", per pt.      Family History  Family History   Problem Relation Age of Onset     Diabetes Brother      Diabetes Father      Alzheimer Disease Father      Unknown/Adopted Mother      Diabetes Paternal Grandfather      Cancer No family hx of         no skin cancer     Skin Cancer No family hx of         no famiy hx of skin cancer     Glaucoma No family hx of      Macular Degeneration No family hx of      Social History   Social History     Tobacco Use     Smoking status: Current Every Day Smoker     Packs/day: 0.50     Years: 40.00     Pack years: 20.00     Types: Cigarettes     Smokeless tobacco: Former User   Substance Use Topics     Alcohol use: No     Alcohol/week: 0.0 standard drinks     Comment: Last etoh in 2007     Drug use: Not Currently     Types: Marijuana, Methamphetamines     Comment: Sober 9 months       Past medical history, past surgical history, medications, allergies, family history, and social history were reviewed with the patient. No additional pertinent items.       Review of Systems  A complete review of systems was performed with pertinent positives and negatives noted in the HPI, and all other systems negative.    Physical Exam   BP: 113/67  Pulse: 89  Temp: 98.1  F (36.7  C)  Resp: 16  Height: 162.6 cm (5' 4\")  Weight: 73.9 kg (163 lb)  SpO2: 99 %  Physical Exam  Physical Exam   Constitutional: oriented to person, place, and time. appears well-developed and well-nourished.   HENT:   Head: Normocephalic and atraumatic.   Neck: Normal range of motion.   Pulmonary/Chest: Effort normal. No respiratory distress.   Cardiac: No murmurs, rubs, gallops. RRR.  Abdominal: Abdomen soft, nontender, nondistended. No rebound tenderness.  MSK: Long bones without deformity or evidence of trauma  Neurological: alert and oriented to person, place, and time.   Skin: Skin is warm and dry.   Psychiatric:  normal mood and affect.  behavior is normal. Thought content normal.     ED Course    "   Procedures            EKG Interpretation:      Interpreted by Venu Wolff MD  Time reviewed: 0340  Symptoms at time of EKG: chest pain   Rhythm: normal sinus   Rate: normal  Axis: normal  Ectopy: none  Conduction: normal  ST Segments/ T Waves: No ST-T wave changes  Q Waves: none  Comparison to prior: Unchanged    Clinical Impression: no acute changes from prior to suggest ischemia or infarction              No results found for any visits on 10/30/21.  Medications   lidocaine (XYLOCAINE) 2 % 15 mL, alum & mag hydroxide-simethicone (MAALOX) 15 mL GI Cocktail (has no administration in time range)   0.9% sodium chloride BOLUS (has no administration in time range)     Followed by   sodium chloride 0.9% infusion (has no administration in time range)        Assessments & Plan (with Medical Decision Making)   MDM  Patient presented with low blood sugar and abdominal pain.  Low blood sugar did resolve after D50.  He is tolerating p.o. intake here.  He did not have a drop here.  He otherwise feels well.  He had complete relief of symptoms with GI cocktail.  Abdominal exam and x-ray are reassuring, very likely bowel obstruction.  Has been passing gas and having bowel movements over the past day.  No vomiting.  Labs otherwise reassuring.  EKG does not show signs of ischemia.  Do not feel repeat troponin is necessary.  Patient will be discharged, he will return if worsening.  Symptoms most likely related to GERD.  Chest x-ray otherwise clear.    I have reviewed the nursing notes. I have reviewed the findings, diagnosis, plan and need for follow up with the patient.    New Prescriptions    No medications on file       Final diagnoses:   Abdominal pain, epigastric       --  Venu Wolff  [facility] EMERGENCY DEPARTMENT  10/30/2021     Venu Wolff MD  10/30/21 [id number]

## 2021-10-30 NOTE — DISCHARGE INSTRUCTIONS
Please make an appointment to follow up with Your Primary Care Provider as soon as possible if not improving.    Return to the emergency department if you have any further concerns.

## 2021-10-30 NOTE — ED TRIAGE NOTES
Pt. Arrives to ED via EMS w/ c/o hypoglycemia, pt. CBG was 60 initially and then 240 after getting 1 amp D50 IV. Pt. Also c/o heartburn.

## 2021-11-10 ENCOUNTER — HOSPITAL ENCOUNTER (EMERGENCY)
Facility: CLINIC | Age: 56
Discharge: HOME | End: 2021-11-10
Attending: EMERGENCY MEDICINE | Admitting: EMERGENCY MEDICINE
Payer: COMMERCIAL

## 2021-11-10 ENCOUNTER — APPOINTMENT (OUTPATIENT)
Dept: GENERAL RADIOLOGY | Facility: CLINIC | Age: 56
End: 2021-11-10
Attending: EMERGENCY MEDICINE
Payer: COMMERCIAL

## 2021-11-10 VITALS
RESPIRATION RATE: 18 BRPM | HEART RATE: 84 BPM | DIASTOLIC BLOOD PRESSURE: 101 MMHG | OXYGEN SATURATION: 100 % | SYSTOLIC BLOOD PRESSURE: 139 MMHG

## 2021-11-10 DIAGNOSIS — K56.609 SMALL BOWEL OBSTRUCTION (H): ICD-10-CM

## 2021-11-10 DIAGNOSIS — E11.69 TYPE 2 DIABETES MELLITUS WITH OTHER SPECIFIED COMPLICATION, WITH LONG-TERM CURRENT USE OF INSULIN (H): ICD-10-CM

## 2021-11-10 DIAGNOSIS — Z00.00 PREVENTATIVE HEALTH CARE: ICD-10-CM

## 2021-11-10 DIAGNOSIS — Z59.00 HOMELESS: ICD-10-CM

## 2021-11-10 DIAGNOSIS — R73.9 HYPERGLYCEMIA: ICD-10-CM

## 2021-11-10 DIAGNOSIS — R10.84 ABDOMINAL PAIN, GENERALIZED: ICD-10-CM

## 2021-11-10 DIAGNOSIS — Z79.4 TYPE 2 DIABETES MELLITUS WITH OTHER SPECIFIED COMPLICATION, WITH LONG-TERM CURRENT USE OF INSULIN (H): ICD-10-CM

## 2021-11-10 LAB
ALBUMIN SERPL-MCNC: 3.5 G/DL (ref 3.4–5)
ALP SERPL-CCNC: 162 U/L (ref 40–150)
ALT SERPL W P-5'-P-CCNC: 18 U/L (ref 0–70)
ANION GAP SERPL CALCULATED.3IONS-SCNC: 9 MMOL/L (ref 3–14)
AST SERPL W P-5'-P-CCNC: 13 U/L (ref 0–45)
BASOPHILS # BLD AUTO: 0 10E3/UL (ref 0–0.2)
BASOPHILS NFR BLD AUTO: 1 %
BILIRUB SERPL-MCNC: 0.8 MG/DL (ref 0.2–1.3)
BUN SERPL-MCNC: 16 MG/DL (ref 7–30)
CALCIUM SERPL-MCNC: 9.3 MG/DL (ref 8.5–10.1)
CHLORIDE BLD-SCNC: 104 MMOL/L (ref 94–109)
CO2 SERPL-SCNC: 17 MMOL/L (ref 20–32)
CREAT SERPL-MCNC: 0.64 MG/DL (ref 0.66–1.25)
EOSINOPHIL # BLD AUTO: 0.1 10E3/UL (ref 0–0.7)
EOSINOPHIL NFR BLD AUTO: 2 %
ERYTHROCYTE [DISTWIDTH] IN BLOOD BY AUTOMATED COUNT: 12.6 % (ref 10–15)
ETHANOL SERPL-MCNC: <0.01 G/DL
GFR SERPL CREATININE-BSD FRML MDRD: >90 ML/MIN/1.73M2
GLUCOSE BLD-MCNC: 342 MG/DL (ref 70–99)
HCT VFR BLD AUTO: 41.1 % (ref 40–53)
HGB BLD-MCNC: 13.9 G/DL (ref 13.3–17.7)
HOLD SPECIMEN: NORMAL
HOLD SPECIMEN: NORMAL
IMM GRANULOCYTES # BLD: 0.1 10E3/UL
IMM GRANULOCYTES NFR BLD: 1 %
LIPASE SERPL-CCNC: 159 U/L (ref 73–393)
LYMPHOCYTES # BLD AUTO: 1.7 10E3/UL (ref 0.8–5.3)
LYMPHOCYTES NFR BLD AUTO: 20 %
MCH RBC QN AUTO: 28.3 PG (ref 26.5–33)
MCHC RBC AUTO-ENTMCNC: 33.8 G/DL (ref 31.5–36.5)
MCV RBC AUTO: 84 FL (ref 78–100)
MONOCYTES # BLD AUTO: 0.5 10E3/UL (ref 0–1.3)
MONOCYTES NFR BLD AUTO: 5 %
NEUTROPHILS # BLD AUTO: 6.4 10E3/UL (ref 1.6–8.3)
NEUTROPHILS NFR BLD AUTO: 71 %
NRBC # BLD AUTO: 0 10E3/UL
NRBC BLD AUTO-RTO: 0 /100
PLATELET # BLD AUTO: 396 10E3/UL (ref 150–450)
POTASSIUM BLD-SCNC: 3.8 MMOL/L (ref 3.4–5.3)
PROT SERPL-MCNC: 7.8 G/DL (ref 6.8–8.8)
RBC # BLD AUTO: 4.92 10E6/UL (ref 4.4–5.9)
SODIUM SERPL-SCNC: 130 MMOL/L (ref 133–144)
WBC # BLD AUTO: 8.8 10E3/UL (ref 4–11)

## 2021-11-10 PROCEDURE — 83690 ASSAY OF LIPASE: CPT | Performed by: EMERGENCY MEDICINE

## 2021-11-10 PROCEDURE — 99285 EMERGENCY DEPT VISIT HI MDM: CPT | Mod: 25 | Performed by: EMERGENCY MEDICINE

## 2021-11-10 PROCEDURE — 96375 TX/PRO/DX INJ NEW DRUG ADDON: CPT | Performed by: EMERGENCY MEDICINE

## 2021-11-10 PROCEDURE — 85025 COMPLETE CBC W/AUTO DIFF WBC: CPT | Performed by: EMERGENCY MEDICINE

## 2021-11-10 PROCEDURE — 99284 EMERGENCY DEPT VISIT MOD MDM: CPT | Performed by: EMERGENCY MEDICINE

## 2021-11-10 PROCEDURE — 36415 COLL VENOUS BLD VENIPUNCTURE: CPT | Performed by: EMERGENCY MEDICINE

## 2021-11-10 PROCEDURE — 82077 ASSAY SPEC XCP UR&BREATH IA: CPT | Performed by: EMERGENCY MEDICINE

## 2021-11-10 PROCEDURE — 250N000011 HC RX IP 250 OP 636: Performed by: EMERGENCY MEDICINE

## 2021-11-10 PROCEDURE — 96374 THER/PROPH/DIAG INJ IV PUSH: CPT | Performed by: EMERGENCY MEDICINE

## 2021-11-10 PROCEDURE — 80053 COMPREHEN METABOLIC PANEL: CPT | Performed by: EMERGENCY MEDICINE

## 2021-11-10 PROCEDURE — 82040 ASSAY OF SERUM ALBUMIN: CPT | Performed by: EMERGENCY MEDICINE

## 2021-11-10 PROCEDURE — 74019 RADEX ABDOMEN 2 VIEWS: CPT | Mod: 26 | Performed by: RADIOLOGY

## 2021-11-10 PROCEDURE — 258N000003 HC RX IP 258 OP 636: Performed by: EMERGENCY MEDICINE

## 2021-11-10 PROCEDURE — 74019 RADEX ABDOMEN 2 VIEWS: CPT

## 2021-11-10 PROCEDURE — 82374 ASSAY BLOOD CARBON DIOXIDE: CPT | Performed by: EMERGENCY MEDICINE

## 2021-11-10 PROCEDURE — 96361 HYDRATE IV INFUSION ADD-ON: CPT | Performed by: EMERGENCY MEDICINE

## 2021-11-10 RX ORDER — METOCLOPRAMIDE 5 MG/1
5 TABLET ORAL
Qty: 120 TABLET | Refills: 0 | Status: SHIPPED | OUTPATIENT
Start: 2021-11-10 | End: 2021-12-10

## 2021-11-10 RX ORDER — HYDROMORPHONE HYDROCHLORIDE 1 MG/ML
0.5 INJECTION, SOLUTION INTRAMUSCULAR; INTRAVENOUS; SUBCUTANEOUS
Status: DISCONTINUED | OUTPATIENT
Start: 2021-11-10 | End: 2021-11-10 | Stop reason: HOSPADM

## 2021-11-10 RX ORDER — ONDANSETRON 2 MG/ML
4 INJECTION INTRAMUSCULAR; INTRAVENOUS EVERY 30 MIN PRN
Status: DISCONTINUED | OUTPATIENT
Start: 2021-11-10 | End: 2021-11-10 | Stop reason: HOSPADM

## 2021-11-10 RX ADMIN — SODIUM CHLORIDE 1000 ML: 900 INJECTION, SOLUTION INTRAVENOUS at 13:12

## 2021-11-10 RX ADMIN — ONDANSETRON 4 MG: 2 INJECTION INTRAMUSCULAR; INTRAVENOUS at 12:30

## 2021-11-10 RX ADMIN — HYDROMORPHONE HYDROCHLORIDE 0.5 MG: 1 INJECTION, SOLUTION INTRAMUSCULAR; INTRAVENOUS; SUBCUTANEOUS at 12:29

## 2021-11-10 SDOH — ECONOMIC STABILITY - HOUSING INSECURITY: HOMELESSNESS UNSPECIFIED: Z59.00

## 2021-11-10 ASSESSMENT — ENCOUNTER SYMPTOMS
BLOOD IN STOOL: 0
DIARRHEA: 0
NAUSEA: 1
VOMITING: 1
ABDOMINAL PAIN: 1

## 2021-11-10 NOTE — ED NOTES
[facility] placed to [facility] per MD. Pt set up at [facility] this evening. Info will be given to patient at discharge.  Rianna Jack RN on 11/10/2021 at 2:03 PM

## 2021-11-10 NOTE — ED PROVIDER NOTES
[facility] ([facility])  11/10/21 [facility] E  History     Chief Complaint   Patient presents with     Abdominal Pain     The history is provided by the patient and medical records.     Andrew Lockwood is a 53 year old male with history of chronic abdominal pain, recurrent small bowel obstructions, HIV/AIDS on Biktarvy HAART therapy (CD4 on 8/24/2021 was 519 with undetectable viral load), type 2 diabetes on insulin, prior CNS toxoplasmosis with residual cognitive issues and a a history of homelessness as well.  Patient presents to the ER today stating that he has had his abdominal pain issues for the last 2 years but his pain today started this morning when he woke up.  He has had no diarrhea, no melena or bright blood per rectum.  He states he has vomited a couple times.  Patient complains of periumbilical pain primarily.    Past Medical History  Past Medical History:   Diagnosis Date     AIDS (H)      Allergic rhinitis due to other allergen     DNS     Anal dysplasia      Chronic abdominal pain      CNS toxoplasmosis (H)      Diabetes type 2, controlled (H)      GERD (gastroesophageal reflux disease)      History of seizure      History of substance abuse (H)      HIV (human immunodeficiency virus infection) (H)      HLD (hyperlipidemia)      Lung nodules      Periungual wart      PTSD (post-traumatic stress disorder)      Sleep apnea     doesn't use CPAP     Past Surgical History:   Procedure Laterality Date     ANOSCOPY N/A 9/9/2020    Procedure: Exam Under Anesthesia, ANOSCOPY, fulgeration of rectal fissures with Rectal Biopsies;  Surgeon: Thanh Lundberg MD;  [facility]: [facility]     COLONOSCOPY Left 1/22/2016    Procedure: COMBINED COLONOSCOPY, SINGLE OR MULTIPLE BIOPSY/POLYPECTOMY BY BIOPSY;  Surgeon: Clark Saini MD;  Location: [facility] GI     HC EXPLORE UNDESC TESTIS,INGUIN/SCROTAL       LAPAROSCOPIC APPENDECTOMY N/A 1/31/2018    Procedure: LAPAROSCOPIC APPENDECTOMY;  LAPAROSCOPIC  "APPENDECTOMY;  Surgeon: Dawn Holt MD;  [facility] OR     LAPAROSCOPY DIAGNOSTIC (GENERAL) N/A 7/26/2016    Procedure: LAPAROSCOPY DIAGNOSTIC (GENERAL);  Surgeon: Susannah Arriaga MD;  [facility]     LAPAROSCOPY DIAGNOSTIC (GENERAL) N/A 4/16/2018    Procedure: LAPAROSCOPY DIAGNOSTIC (GENERAL);  Diagnostic laparoscopy and lysis of adhesions;  Surgeon: Prince Dowling MD;  Location[facility]     OPTICAL TRACKING SYSTEM CRANIOTOMY, EXCISE TUMOR, COMBINED Left 4/10/2015    Procedure: COMBINED OPTICAL TRACKING SYSTEM CRANIOTOMY, EXCISE TUMOR;  Surgeon: Mirlande Colmenares MD;  [facility]     REPAIR GAMEKEEPER'S THUMB Right 12/2/2016    Procedure: REPAIR LIGAMENT ULNAR COLLATERAL THUMB (GAMEKEEPER'S);  Surgeon: Evin Zamorano MD;  Location: [facility] OR     ZZC NONSPECIFIC PROCEDURE      right forearm fracture     Alcohol Swabs PADS  bictegravir-emtricitabine-tenofovir (BIKTARVY) -25 MG per tablet  blood glucose (NO BRAND SPECIFIED) test strip  blood glucose calibration (NO BRAND SPECIFIED) solution  blood glucose monitoring (NO BRAND SPECIFIED) meter device kit  glucose 40 % (400 mg/mL) gel  ibuprofen (ADVIL/MOTRIN) 800 MG tablet  insulin detemir (LEVEMIR PEN) 100 UNIT/ML pen  insulin pen needle (BD SCOTT U/F) 32G X 4 MM miscellaneous  NOVOLOG FLEXPEN 100 UNIT/ML soln  pantoprazole (PROTONIX) 40 MG EC tablet  polyethylene glycol (MIRALAX) 17 GM/Dose powder  SENNA-docusate sodium (SENNA S) 8.6-50 MG tablet  Sharps Container MISC  STATIN NOT PRESCRIBED (INTENTIONAL)  thin (NO BRAND SPECIFIED) lancets  traMADol (ULTRAM) 50 MG tablet      Allergies   Allergen Reactions     Metformin      Abdominal pain     Tylenol [Acetaminophen] Itching     Dulaglutide Rash     Penicillin V Other (See Comments) and Rash     Diffuse maculopapular rash + feels \"high\", per pt.      Family History  Family History   Problem Relation Age of Onset     Diabetes Brother      Diabetes Father      Alzheimer " Disease Father      Unknown/Adopted Mother      Diabetes Paternal Grandfather      Cancer No family hx of         no skin cancer     Skin Cancer No family hx of         no famiy hx of skin cancer     Glaucoma No family hx of      Macular Degeneration No family hx of      Social History   Social History     Tobacco Use     Smoking status: Current Every Day Smoker     Packs/day: 0.50     Years: 40.00     Pack years: 20.00     Types: Cigarettes     Smokeless tobacco: Former User   Substance Use Topics     Alcohol use: No     Alcohol/week: 0.0 standard drinks     Comment: Last etoh in 2007     Drug use: Not Currently     Types: Marijuana, Methamphetamines     Comment: Sober 9 months       Past medical history, past surgical history, medications, allergies, family history, and social history were reviewed with the patient. No additional pertinent items.       Review of Systems   Gastrointestinal: Positive for abdominal pain, nausea and vomiting. Negative for blood in stool and diarrhea.     A complete review of systems was performed with pertinent positives and negatives noted in the HPI, and all other systems negative.    Physical Exam   BP: (!) 139/101  Resp: 24  SpO2: 100 %  Physical Exam  Vitals and nursing note reviewed.   Constitutional:       Appearance: He is not ill-appearing or diaphoretic.   HENT:      Head: Atraumatic.   Cardiovascular:      Rate and Rhythm: Regular rhythm.   Abdominal:      Palpations: Abdomen is soft.      Tenderness: There is no abdominal tenderness.      Comments: Quiet   Neurological:      General: No focal deficit present.      Mental Status: He is alert and oriented to person, place, and time.   Psychiatric:         Mood and Affect: Mood normal.         ED Course      Procedures          Results for orders placed or performed during the hospital encounter of 11/10/21   Abdomen XR, 2 vw, flat and upright     Status: None    Narrative    EXAM: XR ABDOMEN 2VIEWS  11/10/2021 12:56 PM      HISTORY:  mid abd pain       COMPARISON:  10/30/2021    FINDINGS:   AP views of the abdomen.    Nonobstructive bowel gas pattern. No portal venous gas or pneumatosis.  Moderate colonic stool burden.    The included osseous structures and soft tissues are within normal  limits.       Impression    IMPRESSION: Nonobstructive bowel gas pattern with moderate colonic  stool burden.     I have personally reviewed the examination and initial interpretation  and I agree with the findings.    APRIL ELIZONDO MD         SYSTEM ID:  [id number]   Comprehensive metabolic panel     Status: Abnormal   Result Value Ref Range    Sodium 130 (L) 133 - 144 mmol/L    Potassium 3.8 3.4 - 5.3 mmol/L    Chloride 104 94 - 109 mmol/L    Carbon Dioxide (CO2) 17 (L) 20 - 32 mmol/L    Anion Gap 9 3 - 14 mmol/L    Urea Nitrogen 16 7 - 30 mg/dL    Creatinine 0.64 (L) 0.66 - 1.25 mg/dL    Calcium 9.3 8.5 - 10.1 mg/dL    Glucose 342 (H) 70 - 99 mg/dL    Alkaline Phosphatase 162 (H) 40 - 150 U/L    AST 13 0 - 45 U/L    ALT 18 0 - 70 U/L    Protein Total 7.8 6.8 - 8.8 g/dL    Albumin 3.5 3.4 - 5.0 g/dL    Bilirubin Total 0.8 0.2 - 1.3 mg/dL    GFR Estimate >90 >60 mL/min/1.73m2   CBC with platelets and differential     Status: Abnormal   Result Value Ref Range    WBC Count 8.8 4.0 - 11.0 10e3/uL    RBC Count 4.92 4.40 - 5.90 10e6/uL    Hemoglobin 13.9 13.3 - 17.7 g/dL    Hematocrit 41.1 40.0 - 53.0 %    MCV 84 78 - 100 fL    MCH 28.3 26.5 - 33.0 pg    MCHC 33.8 31.5 - 36.5 g/dL    RDW 12.6 10.0 - 15.0 %    Platelet Count 396 150 - 450 10e3/uL    % Neutrophils 71 %    % Lymphocytes 20 %    % Monocytes 5 %    % Eosinophils 2 %    % Basophils 1 %    % Immature Granulocytes 1 %    NRBCs per 100 WBC 0 <1 /100    Absolute Neutrophils 6.4 1.6 - 8.3 10e3/uL    Absolute Lymphocytes 1.7 0.8 - 5.3 10e3/uL    Absolute Monocytes 0.5 0.0 - 1.3 10e3/uL    Absolute Eosinophils 0.1 0.0 - 0.7 10e3/uL    Absolute Basophils 0.0 0.0 - 0.2 10e3/uL    Absolute  Immature Granulocytes 0.1 (H) <=0.0 10e3/uL    Absolute NRBCs 0.0 10e3/uL   Extra Blue Top Tube     Status: None   Result Value Ref Range    Hold Specimen JIC    Extra Red Top Tube     Status: None   Result Value Ref Range    Hold Specimen JIC    Lipase     Status: Normal   Result Value Ref Range    Lipase 159 73 - 393 U/L   Alcohol     Status: Normal   Result Value Ref Range    Alcohol ethyl <0.01 <=0.01 g/dL   CBC with platelets differential     Status: Abnormal    Narrative    The following orders were created for panel order CBC with platelets differential.  Procedure                               Abnormality         Status                     ---------                               -----------         ------                     CBC with platelets and d...[[id number]]  Abnormal            Final result                 Please view results for these tests on the individual orders.   [place] Draw     Status: None    Narrative    The following orders were created for panel order [place] Draw.  Procedure                               Abnormality         Status                     ---------                               -----------         ------                     Extra Blue Top Tube[[id number]]                              Final result               Extra Red Top Tube[[id number]]                               Final result                 Please view results for these tests on the individual orders.     Medications   HYDROmorphone (PF) (DILAUDID) injection 0.5 mg (0.5 mg Intravenous Given 11/10/21 1229)   ondansetron (ZOFRAN) injection 4 mg (4 mg Intravenous Given 11/10/21 1230)   0.9% sodium chloride BOLUS (1,000 mLs Intravenous New Bag 11/10/21 1312)   insulin aspart (NovoLOG) injection (RAPID ACTING) (has no administration in time range)   lidocaine (XYLOCAINE) 2 % 15 mL, alum & mag hydroxide-simethicone (MAALOX) 15 mL GI Cocktail (has no administration in time range)        Assessments & Plan (with Medical Decision  Making)     I have reviewed the nursing notes.    Patient is a very poor historian so it is unclear whether or not the patient has a previous history of gastroparesis or if he has ever been tried on Reglan in the past but at this time the patient will be given some Reglan to try and a primary care referral was initiated so he can follow-up in the clinic.  His homeless situation was also addressed and we were able to find a shelter for him today.  Patient states he does not need any refills and that he has the rest of his medications.    I have reviewed the findings, diagnosis, plan and need for follow up with the patient.    New Prescriptions    METOCLOPRAMIDE (REGLAN) 5 MG TABLET    Take 1 tablet (5 mg) by mouth 4 times daily (before meals and nightly)       Final diagnoses:   Abdominal pain, generalized - probable gastroparesis   Hyperglycemia   Homeless   Small bowel obstruction (H)   Preventative health care   Type 2 diabetes mellitus with other specified complication, with long-term current use of insulin (H)     Monitor your blood glucose and take your diabetes medications as prescribed.    Fill your new prescription for your abdominal pain and start taking as directed.    A referral has been placed into the computer system for you to be seen in a primary care clinic.  They will call you in the next 24 hours to help you set up a primary care visit.  If they do not call you please call them at [facility] (phone: [phone]) so you can be seen in the next 1 to 2 weeks for recheck.    The ER nurse has arranged for you to go to higher ground shelter at 4 PM today.  We will cab you there.    Shelter resources are as follows:  Homeless Shelters In [facility] [place], [place] [id number] [phone]  [email or web address]    Are you in need of a safe place to stay? We provide a bed and supportive resources to 34 men and 6 women every night of the year. The men's and  women's sleeping and shower areas are located in separate, secure areas of the [facility].   Single adults, age 18+   Beds for 34 men and 6 women 365 nights a year   We maintain a safe, sober, and respectful community living environment for all.   Shelter hours are 6pm-7am, daily. All guests must leave by 7am.   Evening meal is served at 7pm.    Our [facility] combines with [facility] and [facility] for a weekly lottery held at [facility]: [address] [place] [address]   Lottery beds are good for a 28 day stay with potential extension of up to 90 days.   You do not need an ID.   You must arrive completely sober and will be breathalyzed at the door.   If you cannot attend the lottery due to work, hospitalization, or restriction from [facility], call [phone] by lottery start time to have your name entered as a call-in. Currently, call-ins are eligible for a spot on our wait list or for a nightly bed at [facility] and [facility].   If you have previously received a lottery bed, you must wait 28 days between lotteries before entering again and 60 days to return to [facility].    Men's Lottery  Monday  Doors at 6pm, starts at 6:30pm  Enter through [address] door    Women's Lottery  Wednesday   Doors at 3pm, starts at 3:30pm  Enter through [address] door    If you do not win a bed at the Monday night lottery, you can call us at [phone] and leave a message each day before 5:45pm throughout the week. You will be placed on a waiting list based on your eligibility. Call again after 6pm (at 6pm is best) of the same day to see if there is an open bed. We keep 2 separate waiting lists--28-day beds and week-long beds. These lists start new every Monday.    Other services provided   Caring staff providing individualized support as you work towards your goals   A hot dinner and self-serve breakfast each day and a sack lunch, as available   A clean, wheelchair accessible  facility with security cameras and controlled entrance   A welcoming living environment with semi-private, shared bedrooms   Personal storage space where you can leave belongings throughout the day   Showers, laundry facilities, recreational space, and computer lab   Regular, in-shelter visits from various community providers: medical, mental health, acupuncture, yoga, veterans services, county , and rapid exit worker   A health and fitness program and art therapy program (as available)   A [facility]       [place] [address]  [phone]  [email or web address]  [facility] [address] is part of [facility], which offers shelter, housing, employment, and systems change for those who are homeless.    [facility] offers sober shelter to 45 adult men every night.     Our comprehensive shelter program includes facilities such as laundry and showers, as well as an employment and savings programs and intensive advocacy to help guests attain housing.  Location and Hours    [address] [place] [address]    5:30 p.m. - 7:00 a.m. Monday-Friday  3:00 p.m. - 7:00 a.m. Saturday  3:00 p.m. - 9:00 [place] [address]  [phone]  [email or web address]  Our mission is to house, support and advocate for people experiencing homelessness.    We know that with close, individual advocacy and support, men, women and families experiencing homelessness achieve housing stability. We are committed to Housing.  First: house people quickly, then provide services as needed.    We provide a full continuum of supportive housing services tot those experiencing homelessness, which is unique in the [facility].     Overnight shelter   Every night in our overnight shelter, [facility] offers a bed, a warm meal, a hot shower, laundry services, advocacy and the dignity of being addressed by name to 66  people experiencing homelessness.   Overnight Shelter   [address] [place] [address]   [phone]   office hours: Mon thru Fri, 3 to 5 pm     Family Housing   The [facility] program provides subsidized, supportive rental housing for families with children. Families receive assistance in finding housing and support in developing the skills to maintain stability more     Single Adult Housing   Our Single Adult programs work with long-term homeless adults who are referred to us by local shelters. Through our five Collaboration of Housing Resources (COHR) teams, we serve over 200 additional people. [facility] also participates in the [facility], an initiative to house 150 adults experiencing homelessness, who have disabilities       PEOPLE [facility] [place] [address]  [phone]  www.peopleservingpeople.org  [facility]'s ultimate goal is to permanently end a family's homelessness with one visit to the shelter. The Family Transformation Support Services (FTSS)  is directly aimed at accomplishing this ambitious goal.    [facility] has provided emergency shelter for more than two decades and our staff has seen firsthand that making the transition from homelessness to stable housing is not easy. Participants are selected for the  through a screening process that targets families who are likely to be underserved by existing resources within [facility] and at risk for returning to shelter. [facility]'s FTSS program staff will provide the additional support these families need to make a successful and long-term transition from homelessness to maintaining stable housing in the community.     Supportive Housing  In addition to providing 99 units of emergency shelter for homeless families, [facility] has the opportunity to help families through longer-term contact with our Supportive Housing Program. [facility] has 10 two-bedroom apartments with open-ended leases for  families with multi-level barriers. Residents of these apartments pay reduced-rate rents for their space and can avail themselves of the services offered by the on-site collaborative agencies. In addition, Supportive Housing residents are provided free telephone service, laundry facilities, utilities, furniture and appliances, and are able to provide and cook their own meals and provide supplies necessary for personal needs and apartment upkeep. The purpose of the Supportive Housing Program is to help families establish a solid foundation of good rental history, employment, education, and [place] [address]  [phone]  [email or web address]    We offer a wide range of basic needs and rehabilitation services to anyone in need, without discrimination. Our shelter is [place]'s largest homeless adult outreach facility and includes a clinical treatment program for men working to beat chemical dependency.    This secure shelter offers a safe place to spend the night. The program is free and available on a first-come, first-served basis. A free hot meal is served at 6 p.m. and warm showers are available.    The emergency housing program provides up to three balanced meals each day and personal hygiene supplies for men and women who are eligible for Count includes the [facility] shelter. Case workers provide referrals to subsidized housing, substance abuse recovery and employment services. A medical respite unit is available for those with medical needs.    Men and women seeking a sober living environment may live at one of our transitional housing facilities in the [place] for up to two years. Residents receive case management and gain independent living experience. Program fees are payable on a monthly basis.     Twenty of our transitional housing units are set aside for veterans.     [employer] [address]  [phone]  Hours: Daily, 4 pm - 9am.  Open 365 days a year, the emergency homeless shelter near [facility] offers a warm place to rest and spend nighttime hours. A light dinner and breakfast are served and shower facilities are available    [address] [facility]   See more details           I, Amilcar Russell, am serving as a trained medical scribe to document services personally performed by Pieter Bruno MD, based on the provider's statements to me.      I, Pieter Bruno MD, was physically present and have reviewed and verified the accuracy of this note documented by Amilcar Russell.  --    [facility] EMERGENCY DEPARTMENT  11/10/2021     Pieter Bruno MD  11/10/21 [id number]

## 2021-11-10 NOTE — ED TRIAGE NOTES
53 M - woke up with abdominal pain; frequent c/o and seen in hospital for.  4mg zofran and 250mls saline given by EMS.  Blood sugar: 355.

## 2021-11-10 NOTE — DISCHARGE INSTRUCTIONS
Monitor your blood glucose and take your diabetes medications as prescribed.    Fill your new prescription for your abdominal pain and start taking as directed.    A referral has been placed into the computer system for you to be seen in a primary care clinic.  They will call you in the next 24 hours to help you set up a primary care visit.  If they do not call you please call them at [facility] (phone: [phone]) so you can be seen in the next 1 to 2 weeks for recheck.    The ER nurse has arranged for you to go to [facility] shelter at 4 PM today.  We will cab you there.    Shelter resources are as follows:  Homeless Shelters In [facility]    [place] [address] [phone]  [email or web address]    Are you in need of a safe place to stay? We provide a bed and supportive resources to 34 men and 6 women every night of the year. The men's and women's sleeping and shower areas are located in separate, secure areas of the detention.   Single adults, age 18+   Beds for 34 men and 6 women 365 nights a year   We maintain a safe, sober, and respectful community living environment for all.   Shelter hours are 6pm-7am, daily. All guests must leave by 7am.   Evening meal is served at 7pm.    [facility] combines with [facility] and [facility] for a weekly lottery held at [facility]: [address] [place] [phone]   Lottery beds are good for a 28 day stay with potential extension of up to 90 days.   You do not need an ID.   You must arrive completely sober and will be breathalyzed at the door.   If you cannot attend the lottery due to work, hospitalization, or restriction from [facility], call [phone] by [employer] start time to have your name entered as a call-in. Currently, call-ins are eligible for a spot on our wait list or for a nightly bed at [place] [facility].   If you have previously received a lottery bed, you  must wait 28 days between lotteries before entering again and 60 days to return to Our [facility].    Men's Lottery  Monday  Doors at 6pm, starts at 6:30pm  Enter through [address] door    Women's Lottery  Wednesday   Doors at 3pm, starts at 3:30pm  Enter through [address] door    If you do not win a bed at the Monday night lottery, you can call us at [phone] and leave a message each day before 5:45pm throughout the week. You will be placed on a waiting list based on your eligibility. Call again after 6pm (at 6pm is best) of the same day to see if there is an open bed. We keep 2 separate waiting lists--28-day beds and week-long beds. These lists start new every Monday.    Other services provided   Caring staff providing individualized support as you work towards your goals   A hot dinner and self-serve breakfast each day and a sack lunch, as available   A clean, wheelchair accessible facility with security cameras and controlled entrance   A welcoming living environment with semi-private, shared bedrooms   Personal storage space where you can leave belongings throughout the day   Showers, laundry facilities, recreational space, and computer lab   Regular, in-shelter visits from various community providers: medical, mental health, acupuncture, yoga, veterans services, county , and rapid exit worker   A health and fitness program and art therapy program (as available)   [facility] [place] [place] [address]  [phone]  [email or web address]  [facility] is part of [facility], which offers shelter, housing, employment, and systems change for those who are homeless.    [facility] offers sober shelter to 45 adult men every night.     Our comprehensive shelter program includes facilities such as laundry and showers, as well as an employment and savings programs and intensive advocacy to help guests attain  housing.  Location and Hours    [address] [place] [address]    5:30 p.m. - 7:00 a.m. Monday-Friday  3:00 p.m. - 7:00 a.m. Saturday  3:00 p.m. - 9:00 a.m. Sunday      [facility] [place] [address]  [phone]  [email or web address]  Our mission is to house, support and advocate for people experiencing homelessness.    We know that with close, individual advocacy and support, men, women and families experiencing homelessness achieve housing stability. We are committed to Housing.  First: house people quickly, then provide services as needed.    We provide a full continuum of supportive housing services tot those experiencing homelessness, which is unique in the [facility].     Overnight shelter   Every night in our overnight shelter, [facility] offers a bed, a warm meal, a hot shower, laundry services, advocacy and the dignity of being addressed by name to 66 people experiencing homelessness.   Overnight Shelter   [address] [place] [address]   [phone]   office hours: Mon thru Fri, 3 to 5 pm     Family Housing   The [facility] program provides subsidized, supportive rental housing for families with children. Families receive assistance in finding housing and support in developing the skills to maintain stability more     Single Adult Housing   Our Single Adult programs work with long-term homeless adults who are referred to us by local shelters. Through our five Collaboration of Housing Resources (COHR) teams, we serve over 200 additional people. [facility] also participates in the [facility], an initiative to house 150 adults experiencing homelessness, who have disabilities       [facility] [place] [address]  [phone]  www.peopleservingpeople.org  [facility]'s ultimate goal is to permanently end a family's homelessness with one visit to the shelter. The Family Transformation Support Services (FTSS)  is directly aimed  at accomplishing this ambitious goal.    [facility] has provided emergency shelter for more than two decades and our staff has seen firsthand that making the transition from homelessness to stable housing is not easy. Participants are selected for the  through a screening process that targets families who are likely to be underserved by existing resources within [facility] and at risk for returning to shelter. [facility]'s FTSS program staff will provide the additional support these families need to make a successful and long-term transition from homelessness to maintaining stable housing in the community.     Supportive Housing  In addition to providing 99 units of emergency shelter for homeless families, [facility] has the opportunity to help families through longer-term contact with our Supportive Housing Program. [facility] has 10 two-bedroom apartments with open-ended leases for families with multi-level barriers. Residents of these apartments pay reduced-rate rents for their space and can avail themselves of the services offered by the on-site collaborative agencies. In addition, Supportive Housing residents are provided free telephone service, laundry facilities, utilities, furniture and appliances, and are able to provide and cook their own meals and provide supplies necessary for personal needs and apartment upkeep. The purpose of the Supportive Housing Program is to help families establish a solid foundation of good rental history, employment, education, and family stability.       [place] [address]  [phone]  [email or web address]    We offer a wide range of basic needs and rehabilitation services to anyone in need, without discrimination. Our shelter is [place]'s largest homeless adult outreach facility and includes a clinical treatment program for men working to beat chemical dependency.    This secure  shelter offers a safe place to spend the night. The program is free and available on a first-come, first-served basis. A free hot meal is served at 6 p.m. and warm showers are available.    The emergency housing program provides up to three balanced meals each day and personal hygiene supplies for men and women who are eligible for [facility] shelter. Case workers provide referrals to subsidized housing, substance abuse recovery and employment services. A medical respite unit is available for those with medical needs.    Men and women seeking a sober living environment may live at one of our transitional housing facilities in the [place] for up to two years. Residents receive case management and gain independent living experience. Program fees are payable on a monthly basis.     Twenty of our transitional housing units are set aside for [place] [address]  [phone]  Hours: Daily, 4 pm - 9am.  Open 365 days a year, the emergency homeless shelter near [facility] offers a warm place to rest and spend nighttime hours. A light dinner and breakfast are served and shower facilities are available    [address] [facility]   See more details       [place], [place] [id number] [phone]  [facility] offers transitional apartments for homeless families with children. This building was named after and dedicated to the Blessed Mother Kate. This beautiful complex was born out of love, compassion, and concern for women and children in poverty. [facility] was built entirely with pr  See more details       [place], [place]   [facility] [place] [address]  [phone]  The [facility], located only a few blocks from [facility], offers most of our day-to-day services for the poor and homeless. At the Day Services Center we offer hot meals, a Food  and Clothing Shelf, Dental and Medical Clinics, and assistance with a variety   See more details       [place], [place]   [facility] [place] [address]  6.12 miles from [facility]  [phone]  CTI is an empirically supported, time-limited case management model designed to prevent homelessness and other adverse outcomes in people with mental illness following discharge from hospitals, prisons and other institutions. CTI is designed to be most effective when individuals are housed in the co  [place] [address]  8.54 miles from [facility]  [phone]  Programs for single women.   [place] [address]  11.19 miles from [facility]  [phone]  Supportive Services at the [facility] is the parent that most residents never had or at least didn t take advantage of when they had the chance. It is impossible to quantify the value of an encouraging word when a mom ensures her child is getting to school every day on time or a  See more details

## 2021-11-15 ENCOUNTER — HOSPITAL ENCOUNTER (EMERGENCY)
Facility: CLINIC | Age: 56
Discharge: HOME | End: 2021-11-15
Attending: EMERGENCY MEDICINE | Admitting: EMERGENCY MEDICINE
Payer: COMMERCIAL

## 2021-11-15 ENCOUNTER — APPOINTMENT (OUTPATIENT)
Dept: GENERAL RADIOLOGY | Facility: CLINIC | Age: 56
End: 2021-11-15
Attending: EMERGENCY MEDICINE
Payer: COMMERCIAL

## 2021-11-15 VITALS
RESPIRATION RATE: 18 BRPM | HEIGHT: 64 IN | WEIGHT: 160 LBS | TEMPERATURE: 98.2 F | BODY MASS INDEX: 27.31 KG/M2 | DIASTOLIC BLOOD PRESSURE: 85 MMHG | OXYGEN SATURATION: 98 % | SYSTOLIC BLOOD PRESSURE: 124 MMHG | HEART RATE: 111 BPM

## 2021-11-15 DIAGNOSIS — K59.00 CONSTIPATION, UNSPECIFIED CONSTIPATION TYPE: ICD-10-CM

## 2021-11-15 DIAGNOSIS — R10.9 LEFT SIDED ABDOMINAL PAIN: ICD-10-CM

## 2021-11-15 LAB
ALBUMIN SERPL-MCNC: 3.6 G/DL (ref 3.4–5)
ALP SERPL-CCNC: 147 U/L (ref 40–150)
ALT SERPL W P-5'-P-CCNC: 19 U/L (ref 0–70)
ANION GAP SERPL CALCULATED.3IONS-SCNC: 7 MMOL/L (ref 3–14)
AST SERPL W P-5'-P-CCNC: 11 U/L (ref 0–45)
BASOPHILS # BLD AUTO: 0.1 10E3/UL (ref 0–0.2)
BASOPHILS NFR BLD AUTO: 1 %
BILIRUB SERPL-MCNC: 0.6 MG/DL (ref 0.2–1.3)
BUN SERPL-MCNC: 20 MG/DL (ref 7–30)
CALCIUM SERPL-MCNC: 8.8 MG/DL (ref 8.5–10.1)
CHLORIDE BLD-SCNC: 100 MMOL/L (ref 94–109)
CO2 SERPL-SCNC: 27 MMOL/L (ref 20–32)
CREAT SERPL-MCNC: 0.71 MG/DL (ref 0.66–1.25)
EOSINOPHIL # BLD AUTO: 0.2 10E3/UL (ref 0–0.7)
EOSINOPHIL NFR BLD AUTO: 2 %
ERYTHROCYTE [DISTWIDTH] IN BLOOD BY AUTOMATED COUNT: 12.7 % (ref 10–15)
GFR SERPL CREATININE-BSD FRML MDRD: >90 ML/MIN/1.73M2
GLUCOSE BLD-MCNC: 298 MG/DL (ref 70–99)
HCT VFR BLD AUTO: 39.1 % (ref 40–53)
HGB BLD-MCNC: 13.2 G/DL (ref 13.3–17.7)
HOLD SPECIMEN: NORMAL
HOLD SPECIMEN: NORMAL
IMM GRANULOCYTES # BLD: 0.1 10E3/UL
IMM GRANULOCYTES NFR BLD: 1 %
LIPASE SERPL-CCNC: 152 U/L (ref 73–393)
LYMPHOCYTES # BLD AUTO: 2.6 10E3/UL (ref 0.8–5.3)
LYMPHOCYTES NFR BLD AUTO: 32 %
MCH RBC QN AUTO: 28.1 PG (ref 26.5–33)
MCHC RBC AUTO-ENTMCNC: 33.8 G/DL (ref 31.5–36.5)
MCV RBC AUTO: 83 FL (ref 78–100)
MONOCYTES # BLD AUTO: 0.6 10E3/UL (ref 0–1.3)
MONOCYTES NFR BLD AUTO: 7 %
NEUTROPHILS # BLD AUTO: 4.7 10E3/UL (ref 1.6–8.3)
NEUTROPHILS NFR BLD AUTO: 57 %
NRBC # BLD AUTO: 0 10E3/UL
NRBC BLD AUTO-RTO: 0 /100
PLATELET # BLD AUTO: 392 10E3/UL (ref 150–450)
POTASSIUM BLD-SCNC: 3.6 MMOL/L (ref 3.4–5.3)
PROT SERPL-MCNC: 7.5 G/DL (ref 6.8–8.8)
RBC # BLD AUTO: 4.7 10E6/UL (ref 4.4–5.9)
SODIUM SERPL-SCNC: 134 MMOL/L (ref 133–144)
WBC # BLD AUTO: 8.1 10E3/UL (ref 4–11)

## 2021-11-15 PROCEDURE — 96360 HYDRATION IV INFUSION INIT: CPT | Performed by: EMERGENCY MEDICINE

## 2021-11-15 PROCEDURE — 74019 RADEX ABDOMEN 2 VIEWS: CPT

## 2021-11-15 PROCEDURE — 36415 COLL VENOUS BLD VENIPUNCTURE: CPT | Performed by: EMERGENCY MEDICINE

## 2021-11-15 PROCEDURE — 85025 COMPLETE CBC W/AUTO DIFF WBC: CPT | Performed by: EMERGENCY MEDICINE

## 2021-11-15 PROCEDURE — 83690 ASSAY OF LIPASE: CPT | Performed by: EMERGENCY MEDICINE

## 2021-11-15 PROCEDURE — 99284 EMERGENCY DEPT VISIT MOD MDM: CPT | Mod: 25 | Performed by: EMERGENCY MEDICINE

## 2021-11-15 PROCEDURE — 80053 COMPREHEN METABOLIC PANEL: CPT | Performed by: EMERGENCY MEDICINE

## 2021-11-15 PROCEDURE — 74019 RADEX ABDOMEN 2 VIEWS: CPT | Mod: 26 | Performed by: RADIOLOGY

## 2021-11-15 PROCEDURE — 99285 EMERGENCY DEPT VISIT HI MDM: CPT | Performed by: EMERGENCY MEDICINE

## 2021-11-15 PROCEDURE — 258N000003 HC RX IP 258 OP 636: Performed by: EMERGENCY MEDICINE

## 2021-11-15 RX ORDER — SENNA AND DOCUSATE SODIUM 50; 8.6 MG/1; MG/1
1 TABLET, FILM COATED ORAL 2 TIMES DAILY PRN
Qty: 30 TABLET | Refills: 0 | Status: ON HOLD | OUTPATIENT
Start: 2021-11-15 | End: 2022-02-21

## 2021-11-15 RX ADMIN — SODIUM CHLORIDE 1000 ML: 900 INJECTION, SOLUTION INTRAVENOUS at 04:20

## 2021-11-15 ASSESSMENT — MIFFLIN-ST. JEOR: SCORE: 1471.76

## 2021-11-15 NOTE — ED NOTES
"Pt refuses discharge VS. Pt takes the two prescriptions, tears them into pieces, and shoves them into the garbage, stating \"I'm done with you putas, you think I'm stupid. I'm not constipated.\"    Pt ambulatory to lobby at 0547.  "

## 2021-11-15 NOTE — ED TRIAGE NOTES
Pt presents to the ER with Abdominal Pain. Pt states that pain started at 5 pm yesterday. Pt says the pain comes when he eats. Pt states he hasn't had a BM for 3 days.

## 2021-11-15 NOTE — DISCHARGE INSTRUCTIONS
Instructions from your doctor today:  Emergency Department testing is focused on the potential causes of your symptoms that are the most dangerous possibilities, and cannot cover every possibility. Based on the evaluation, it was deemed sufficiently safe to discharge and continue management through the clinics. Thus, follow-up is very important to assess for improvement/worsening, potential further testing, and potential treatment adjustments. If you were given opioid pain medications or other medications that can make you drowsy while in the ED, you should not drive for at least several hours and not until you feel completely back to normal.     Please make an appointment to follow up with:  - Your Primary Care Provider in 3-7 days  - If you do not have a primary care provider, you can be seen in follow-up and establish care by calling any of the clinics below:     - [facility] (phone: [phone])     - [facility] (phone: [phone])   - Have your clinic provider review the results from today's visit with you again, including any potential follow-up or additional testing that may be needed based on the results. Occasionally, incidental findings are found on later review by radiologists that may need follow-up.     Return to the Emergency Department immediately if you have worsening symptoms, or any other urgent or potentially life-threatening concerns.

## 2021-11-15 NOTE — ED PROVIDER NOTES
History     Chief Complaint   Patient presents with     Abdominal Pain     HPI  Andrew Lockwood is a 53 year old male with PMH notable for DM2, AIDS, appendectomy, SBO who presents to the ED with abdominal pain.  Patient reports pain for a few days, gradually worsening.  Patient points to left upper quadrant abdomen is location, radiates through the left abdomen.  Last BM was 3 days ago, small, dry, and difficult to pass.  No vomiting.  Patient states he has been constipated for the past week or so, but had been in the ED previously and has not been taking anything for constipation since then.  No urinary symptoms, no fevers.    Past Medical History  Past Medical History:   Diagnosis Date     AIDS (H)      Allergic rhinitis due to other allergen     DNS     Anal dysplasia      Chronic abdominal pain      CNS toxoplasmosis (H)      Diabetes type 2, controlled (H)      GERD (gastroesophageal reflux disease)      History of seizure      History of substance abuse (H)      HIV (human immunodeficiency virus infection) (H)      HLD (hyperlipidemia)      Lung nodules      Periungual wart      PTSD (post-traumatic stress disorder)      Sleep apnea     doesn't use CPAP     Past Surgical History:   Procedure Laterality Date     ANOSCOPY N/A 9/9/2020    Procedure: Exam Under Anesthesia, ANOSCOPY, fulgeration of rectal fissures with Rectal Biopsies;  Surgeon: Thanh Lundberg MD;  Location: [facility]     COLONOSCOPY Left 1/22/2016    Procedure: COMBINED COLONOSCOPY, SINGLE OR MULTIPLE BIOPSY/POLYPECTOMY BY BIOPSY;  Surgeon: Clark Saini MD;  [facility]     HC EXPLORE UNDESC TESTIS,INGUIN/SCROTAL       LAPAROSCOPIC APPENDECTOMY N/A 1/31/2018    Procedure: LAPAROSCOPIC APPENDECTOMY;  LAPAROSCOPIC APPENDECTOMY;  Surgeon: Dawn Holt MD;  Location: [facility] OR     LAPAROSCOPY DIAGNOSTIC (GENERAL) N/A 7/26/2016    Procedure: LAPAROSCOPY DIAGNOSTIC (GENERAL);  Surgeon: Susannah Arriaga MD;  Location: [facility] OR  "    LAPAROSCOPY DIAGNOSTIC (GENERAL) N/A 4/16/2018    Procedure: LAPAROSCOPY DIAGNOSTIC (GENERAL);  Diagnostic laparoscopy and lysis of adhesions;  Surgeon: Prince Dowling MD;  Location: [facility] OR     OPTICAL TRACKING SYSTEM CRANIOTOMY, EXCISE TUMOR, COMBINED Left 4/10/2015    Procedure: COMBINED OPTICAL TRACKING SYSTEM CRANIOTOMY, EXCISE TUMOR;  Surgeon: Mirlande Colmenares MD;  Location: [facility] OR     REPAIR GAMEKEEPER'S THUMB Right 12/2/2016    Procedure: REPAIR LIGAMENT ULNAR COLLATERAL THUMB (GAMEKEEPER'S);  Surgeon: Evin Zamorano MD;  Location: [facility] OR     ZC NONSPECIFIC PROCEDURE      right forearm fracture     magnesium citrate solution  SENNA-docusate sodium (SENNA S) 8.6-50 MG tablet  Alcohol Swabs PADS  bictegravir-emtricitabine-tenofovir (BIKTARVY) -25 MG per tablet  blood glucose (NO BRAND SPECIFIED) test strip  blood glucose calibration (NO BRAND SPECIFIED) solution  blood glucose monitoring (NO BRAND SPECIFIED) meter device kit  glucose 40 % (400 mg/mL) gel  insulin detemir (LEVEMIR PEN) 100 UNIT/ML pen  insulin pen needle (BD SCOTT U/F) 32G X 4 MM miscellaneous  metoclopramide (REGLAN) 5 MG tablet  NOVOLOG FLEXPEN 100 UNIT/ML soln  pantoprazole (PROTONIX) 40 MG EC tablet  polyethylene glycol (MIRALAX) 17 GM/Dose powder  SENNA-docusate sodium (SENNA S) 8.6-50 MG tablet  Sharps Container MISC  STATIN NOT PRESCRIBED (INTENTIONAL)  thin (NO BRAND SPECIFIED) lancets  traMADol (ULTRAM) 50 MG tablet      Allergies   Allergen Reactions     Metformin      Abdominal pain     Tylenol [Acetaminophen] Itching     Dulaglutide Rash     Penicillin V Other (See Comments) and Rash     Diffuse maculopapular rash + feels \"high\", per pt.      Social History   Social History     Tobacco Use     Smoking status: Current Every Day Smoker     Packs/day: 0.50     Years: 40.00     Pack years: 20.00     Types: Cigarettes     Smokeless tobacco: Former User   Substance Use Topics     Alcohol use: No     Alcohol/week: 0.0 " "standard drinks     Comment: Last etoh in 2007     Drug use: Not Currently     Types: Marijuana, Methamphetamines     Comment: Sober 9 months       Past medical history and social history were reviewed with the patient. Additional pertinent items: None     Review of Systems  A complete review of systems was performed with pertinent positives and negatives noted in the HPI, and all other systems negative.    Physical Exam   BP: 124/85  Pulse: 111  Temp: 98.2  F (36.8  C)  Resp: 18  Height: 162.6 cm (5' 4\")  Weight: 72.6 kg (160 lb)  SpO2: 98 %    Physical Exam  General: no acute distress. Appears stated age.   HENT: MMM, no oropharyngeal lesions  Eyes: PERRL, normal sclerae  Cardio: Borderline tachycardic rate. Regular rhythm. Extremities well perfused  Resp: Normal work of breathing, normal respiratory rate.  Chest/Back: no visual signs of trauma, no CVA tenderness  Abdomen: Left upper quadrant greater than left lower quadrant tenderness, no right-sided tenderness, non-distended, no rebound, no guarding  Neuro: alert and fully oriented. CN II-XII grossly intact. Grossly normal strength and sensation in all extremities.   MSK: no deformities. Grossly normal ROM in extremities.   Integumentary/Skin: no rash visualized, normal color  Psych: normal affect, normal behavior    ED Course      Procedures          Labs Ordered and Resulted from Time of ED Arrival to Time of ED Departure   COMPREHENSIVE METABOLIC PANEL - Abnormal       Result Value    Sodium 134      Potassium 3.6      Chloride 100      Carbon Dioxide (CO2) 27      Anion Gap 7      Urea Nitrogen 20      Creatinine 0.71      Calcium 8.8      Glucose 298 (*)     Alkaline Phosphatase 147      AST 11      ALT 19      Protein Total 7.5      Albumin 3.6      Bilirubin Total 0.6      GFR Estimate >90     CBC WITH PLATELETS AND DIFFERENTIAL - Abnormal    WBC Count 8.1      RBC Count 4.70      Hemoglobin 13.2 (*)     Hematocrit 39.1 (*)     MCV 83      MCH 28.1      " MCHC 33.8      RDW 12.7      Platelet Count 392      % Neutrophils 57      % Lymphocytes 32      % Monocytes 7      % Eosinophils 2      % Basophils 1      % Immature Granulocytes 1      NRBCs per 100 WBC 0      Absolute Neutrophils 4.7      Absolute Lymphocytes 2.6      Absolute Monocytes 0.6      Absolute Eosinophils 0.2      Absolute Basophils 0.1      Absolute Immature Granulocytes 0.1 (*)     Absolute NRBCs 0.0     LIPASE - Normal    Lipase 152       XR Abdomen 2 Views   Final Result   IMPRESSION: Upright and decubitus views. The bowel gas pattern is unremarkable. No free air or significant air-fluid levels. Moderate amount of stool in the ascending colon and a small amount of stool in the remainder of the colon.              Assessments & Plan (with Medical Decision Making)   Patient presenting with left-sided abdominal pain with difficult to pass bowel movements. Vitals in the ED unremarkable. Nursing notes reviewed. Initial differential diagnosis includes but not limited to constipation, SBO, gastritis, pancreatitis.     Chart review notable for ED visit on 11/10/2021.  On that day, chemistry panel unremarkable, CBC unremarkable, abdominal x-ray without obstructive pattern, did show significant stool burden.    Today, no RUQ pain, no LFT elevations to suggest hepatobiliary pathology. No lipase elevation to suggest pancreatitis. No peritoneal signs on exam to suggest peritonitis.  Abdominal x-ray without evidence of bowel obstruction, did demonstrate significant colonic stool burden.     The complete clinical picture is most consistent with constipation. After counseling on the diagnosis, work-up, and treatment plan, the patient was discharged to home. Senna-docusate and Mag Citrate prescribed for constipation. The patient was advised to follow-up with primary care in a few days. The patient was advised to return to the ED if worsening symptoms, or if there are any urgent/life-threatening concerns.     Final  diagnoses:   Left sided abdominal pain   Constipation, unspecified constipation type     Discharge Medication List as of 11/15/2021  5:37 AM      START taking these medications    Details   magnesium citrate solution Take 296 mLs by mouth once for 1 dose, Disp-296 mL, R-0, Local Print      !! SENNA-docusate sodium (SENNA S) 8.6-50 MG tablet Take 1 tablet by mouth 2 times daily as needed (constipation), Disp-30 tablet, R-0, Local Print       !! - Potential duplicate medications found. Please discuss with provider.          --  Lázaro Callejas MD   Emergency Medicine   [facility] EMERGENCY DEPARTMENT  11/15/2021     Lázaro Callejas MD  11/15/21 0516

## 2021-11-30 ENCOUNTER — APPOINTMENT (OUTPATIENT)
Dept: CT IMAGING | Facility: CLINIC | Age: 56
End: 2021-11-30
Attending: EMERGENCY MEDICINE
Payer: COMMERCIAL

## 2021-11-30 ENCOUNTER — HOSPITAL ENCOUNTER (EMERGENCY)
Facility: CLINIC | Age: 56
Discharge: HOME | End: 2021-11-30
Attending: EMERGENCY MEDICINE | Admitting: EMERGENCY MEDICINE
Payer: COMMERCIAL

## 2021-11-30 ENCOUNTER — APPOINTMENT (OUTPATIENT)
Dept: GENERAL RADIOLOGY | Facility: CLINIC | Age: 56
End: 2021-11-30
Attending: EMERGENCY MEDICINE
Payer: COMMERCIAL

## 2021-11-30 VITALS
WEIGHT: 170 LBS | RESPIRATION RATE: 16 BRPM | SYSTOLIC BLOOD PRESSURE: 122 MMHG | OXYGEN SATURATION: 98 % | HEART RATE: 86 BPM | TEMPERATURE: 98.3 F | DIASTOLIC BLOOD PRESSURE: 74 MMHG | HEIGHT: 64 IN | BODY MASS INDEX: 29.02 KG/M2

## 2021-11-30 DIAGNOSIS — J04.0 LARYNGITIS: ICD-10-CM

## 2021-11-30 DIAGNOSIS — R51.9 NONINTRACTABLE HEADACHE, UNSPECIFIED CHRONICITY PATTERN, UNSPECIFIED HEADACHE TYPE: ICD-10-CM

## 2021-11-30 DIAGNOSIS — E08.65 DIABETES MELLITUS DUE TO UNDERLYING CONDITION, UNCONTROLLED, WITH HYPERGLYCEMIA (H): ICD-10-CM

## 2021-11-30 LAB
ALBUMIN SERPL-MCNC: 3.1 G/DL (ref 3.4–5)
ALP SERPL-CCNC: 162 U/L (ref 40–150)
ALT SERPL W P-5'-P-CCNC: 22 U/L (ref 0–70)
ANION GAP SERPL CALCULATED.3IONS-SCNC: 7 MMOL/L (ref 3–14)
AST SERPL W P-5'-P-CCNC: 10 U/L (ref 0–45)
BASOPHILS # BLD AUTO: 0.1 10E3/UL (ref 0–0.2)
BASOPHILS NFR BLD AUTO: 1 %
BILIRUB SERPL-MCNC: 0.3 MG/DL (ref 0.2–1.3)
BUN SERPL-MCNC: 18 MG/DL (ref 7–30)
CALCIUM SERPL-MCNC: 8.9 MG/DL (ref 8.5–10.1)
CHLORIDE BLD-SCNC: 101 MMOL/L (ref 94–109)
CO2 SERPL-SCNC: 26 MMOL/L (ref 20–32)
CREAT SERPL-MCNC: 0.92 MG/DL (ref 0.66–1.25)
EOSINOPHIL # BLD AUTO: 0.2 10E3/UL (ref 0–0.7)
EOSINOPHIL NFR BLD AUTO: 2 %
ERYTHROCYTE [DISTWIDTH] IN BLOOD BY AUTOMATED COUNT: 13.1 % (ref 10–15)
FLUAV RNA SPEC QL NAA+PROBE: NEGATIVE
FLUBV RNA RESP QL NAA+PROBE: NEGATIVE
GFR SERPL CREATININE-BSD FRML MDRD: >90 ML/MIN/1.73M2
GLUCOSE BLD-MCNC: 462 MG/DL (ref 70–99)
GLUCOSE BLDC GLUCOMTR-MCNC: 281 MG/DL (ref 70–99)
GLUCOSE BLDC GLUCOMTR-MCNC: 378 MG/DL (ref 70–99)
HCT VFR BLD AUTO: 39.1 % (ref 40–53)
HGB BLD-MCNC: 13.1 G/DL (ref 13.3–17.7)
HOLD SPECIMEN: NORMAL
HOLD SPECIMEN: NORMAL
IMM GRANULOCYTES # BLD: 0.1 10E3/UL
IMM GRANULOCYTES NFR BLD: 1 %
LYMPHOCYTES # BLD AUTO: 2.2 10E3/UL (ref 0.8–5.3)
LYMPHOCYTES NFR BLD AUTO: 25 %
MAGNESIUM SERPL-MCNC: 2.1 MG/DL (ref 1.6–2.3)
MCH RBC QN AUTO: 28.1 PG (ref 26.5–33)
MCHC RBC AUTO-ENTMCNC: 33.5 G/DL (ref 31.5–36.5)
MCV RBC AUTO: 84 FL (ref 78–100)
MONOCYTES # BLD AUTO: 0.6 10E3/UL (ref 0–1.3)
MONOCYTES NFR BLD AUTO: 7 %
NEUTROPHILS # BLD AUTO: 5.7 10E3/UL (ref 1.6–8.3)
NEUTROPHILS NFR BLD AUTO: 64 %
NRBC # BLD AUTO: 0 10E3/UL
NRBC BLD AUTO-RTO: 0 /100
PLATELET # BLD AUTO: 355 10E3/UL (ref 150–450)
POTASSIUM BLD-SCNC: 4 MMOL/L (ref 3.4–5.3)
PROT SERPL-MCNC: 7 G/DL (ref 6.8–8.8)
RBC # BLD AUTO: 4.66 10E6/UL (ref 4.4–5.9)
RSV RNA SPEC NAA+PROBE: NEGATIVE
SARS-COV-2 RNA RESP QL NAA+PROBE: NEGATIVE
SODIUM SERPL-SCNC: 134 MMOL/L (ref 133–144)
TROPONIN I SERPL HS-MCNC: 4 NG/L
WBC # BLD AUTO: 8.8 10E3/UL (ref 4–11)

## 2021-11-30 PROCEDURE — 36415 COLL VENOUS BLD VENIPUNCTURE: CPT | Performed by: EMERGENCY MEDICINE

## 2021-11-30 PROCEDURE — 84484 ASSAY OF TROPONIN QUANT: CPT | Performed by: EMERGENCY MEDICINE

## 2021-11-30 PROCEDURE — 96360 HYDRATION IV INFUSION INIT: CPT | Performed by: EMERGENCY MEDICINE

## 2021-11-30 PROCEDURE — 71046 X-RAY EXAM CHEST 2 VIEWS: CPT | Mod: 26 | Performed by: STUDENT IN AN ORGANIZED HEALTH CARE EDUCATION/TRAINING PROGRAM

## 2021-11-30 PROCEDURE — 250N000012 HC RX MED GY IP 250 OP 636 PS 637: Performed by: EMERGENCY MEDICINE

## 2021-11-30 PROCEDURE — 87637 SARSCOV2&INF A&B&RSV AMP PRB: CPT | Mod: 59 | Performed by: EMERGENCY MEDICINE

## 2021-11-30 PROCEDURE — 99285 EMERGENCY DEPT VISIT HI MDM: CPT | Mod: 25 | Performed by: EMERGENCY MEDICINE

## 2021-11-30 PROCEDURE — 93010 ELECTROCARDIOGRAM REPORT: CPT | Performed by: EMERGENCY MEDICINE

## 2021-11-30 PROCEDURE — 258N000003 HC RX IP 258 OP 636: Performed by: EMERGENCY MEDICINE

## 2021-11-30 PROCEDURE — 96361 HYDRATE IV INFUSION ADD-ON: CPT | Performed by: EMERGENCY MEDICINE

## 2021-11-30 PROCEDURE — 83735 ASSAY OF MAGNESIUM: CPT | Performed by: EMERGENCY MEDICINE

## 2021-11-30 PROCEDURE — 85025 COMPLETE CBC W/AUTO DIFF WBC: CPT | Performed by: EMERGENCY MEDICINE

## 2021-11-30 PROCEDURE — 70450 CT HEAD/BRAIN W/O DYE: CPT

## 2021-11-30 PROCEDURE — 70450 CT HEAD/BRAIN W/O DYE: CPT | Mod: 26 | Performed by: RADIOLOGY

## 2021-11-30 PROCEDURE — C9803 HOPD COVID-19 SPEC COLLECT: HCPCS | Performed by: EMERGENCY MEDICINE

## 2021-11-30 PROCEDURE — 82040 ASSAY OF SERUM ALBUMIN: CPT | Performed by: EMERGENCY MEDICINE

## 2021-11-30 PROCEDURE — 93005 ELECTROCARDIOGRAM TRACING: CPT | Performed by: EMERGENCY MEDICINE

## 2021-11-30 PROCEDURE — 96372 THER/PROPH/DIAG INJ SC/IM: CPT | Performed by: EMERGENCY MEDICINE

## 2021-11-30 PROCEDURE — 71046 X-RAY EXAM CHEST 2 VIEWS: CPT

## 2021-11-30 PROCEDURE — 87637 SARSCOV2&INF A&B&RSV AMP PRB: CPT | Performed by: EMERGENCY MEDICINE

## 2021-11-30 RX ADMIN — SODIUM CHLORIDE 1000 ML: 9 INJECTION, SOLUTION INTRAVENOUS at 20:11

## 2021-11-30 RX ADMIN — SODIUM CHLORIDE 1000 ML: 9 INJECTION, SOLUTION INTRAVENOUS at 18:19

## 2021-11-30 RX ADMIN — INSULIN ASPART 6 UNITS: 100 INJECTION, SOLUTION INTRAVENOUS; SUBCUTANEOUS at 20:11

## 2021-11-30 ASSESSMENT — ENCOUNTER SYMPTOMS
VOMITING: 0
NAUSEA: 0
HEADACHES: 1
PHOTOPHOBIA: 0
VOICE CHANGE: 1
DIARRHEA: 0
ABDOMINAL PAIN: 0
DYSURIA: 0
FREQUENCY: 0
SORE THROAT: 0
COUGH: 1
PALPITATIONS: 0
WEAKNESS: 0
SHORTNESS OF BREATH: 0
NUMBNESS: 0

## 2021-11-30 ASSESSMENT — MIFFLIN-ST. JEOR: SCORE: 1512.11

## 2021-11-30 NOTE — ED TRIAGE NOTES
Pt presents ambulatory to triage with reports of losing his voice, feeling light headed, fell last night - denies hitting his head, also reporting a dry cough. Sx started 2 nights ago. Pt states he resides in a shelter and 20 people there have covid.  ABCDs in tact in triage.

## 2021-11-30 NOTE — ED PROVIDER NOTES
[place] EMERGENCY DEPARTMENT ([facility])  11/30/21  History     Chief Complaint   Patient presents with     Cough     Fall     Generalized Weakness     The history is provided by medical records and the patient.     Andrew Lockwood is a 54 year old male with a past medical history significant for diabetes mellitus type 2, HIV/AIDS, s/p appendectomy, hx of small bowel obstruction who presents to the emergency department today with multiple complaints. He reports that since yesterday he has had difficult time speaking with a very hoarse voice.  He denies any sore throat but simply has had a very hoarse voice.  He has had 1 day of nasal congestion which is disappeared now, however he has noted a dry cough that is occasionally productive of creamy sputum.  He denies feeling short of breath.  Denies any chest pain or abdominal pain.  Denies nausea, vomiting, or diarrhea.  No dysuria.  Patient does report that he believes he is dehydrated because he has had muscle cramps and muscle spasms which is fairly common for him when he gets dehydrated.  He is not certain why he might be dehydrated as he says he is drinking plenty of fluids.  He reports he is taking all of his medications.  He also reports that he received a Covid vaccine with the second dose last month, note this is not reflected in the [facility].  He does report he is currently staying in a homeless shelter where 20 people have Covid.    Patient is also complaining of a bifrontal headache which started yesterday.  He denies any weakness or numbness, no vision changes.  He has not taken anything for the headache.  He reports that when he drinks water his headache goes away.  Yesterday he was standing up for a period of time waiting for a bus when he became very dizzy and fell. He did not lose consciousness and denies hitting his head.  He does not know what caused him to fall as he denies any prodromal symptoms particularly chest pain, palpitations, or  shortness of breath.      Per review of the patient's medical record, patient was seen at [facility] ED on 11/15/2021 for evaluation of left-sided abdominal pain with difficult to pass bowel movements.  Patient was discharged to home with prescription for senna docusate and mag citrate for constipation.     This part of the medical record was transcribed by Katalina Hebert Medical Scribe, from a dictation done by Leticia Cruz MD.     Past Medical History:   Diagnosis Date     AIDS (H)      Allergic rhinitis due to other allergen     DNS     Anal dysplasia      Chronic abdominal pain      CNS toxoplasmosis (H)      Diabetes type 2, controlled (H)      GERD (gastroesophageal reflux disease)      History of seizure      History of substance abuse (H)      HIV (human immunodeficiency virus infection) (H)      HLD (hyperlipidemia)      Lung nodules      Periungual wart      PTSD (post-traumatic stress disorder)      Sleep apnea     doesn't use CPAP       Past Surgical History:   Procedure Laterality Date     ANOSCOPY N/A 9/9/2020    Procedure: Exam Under Anesthesia, ANOSCOPY, fulgeration of rectal fissures with Rectal Biopsies;  Surgeon: Thanh Lundberg MD;  Location: [facility] OR     COLONOSCOPY Left 1/22/2016    Procedure: COMBINED COLONOSCOPY, SINGLE OR MULTIPLE BIOPSY/POLYPECTOMY BY BIOPSY;  Surgeon: Clark Saini MD;  Location: [facility] GI     HC EXPLORE UNDESC TESTIS,INGUIN/SCROTAL       LAPAROSCOPIC APPENDECTOMY N/A 1/31/2018    Procedure: LAPAROSCOPIC APPENDECTOMY;  LAPAROSCOPIC APPENDECTOMY;  Surgeon: Dawn oHlt MD;  Location: [facility] OR     LAPAROSCOPY DIAGNOSTIC (GENERAL) N/A 7/26/2016    Procedure: LAPAROSCOPY DIAGNOSTIC (GENERAL);  Surgeon: Susannah Arriaga MD;  Location: [facility] OR     LAPAROSCOPY DIAGNOSTIC (GENERAL) N/A 4/16/2018    Procedure: LAPAROSCOPY DIAGNOSTIC (GENERAL);  Diagnostic laparoscopy and lysis of adhesions;  Surgeon: Prince Dowling MD;  Location: [facility] OR      OPTICAL TRACKING SYSTEM CRANIOTOMY, EXCISE TUMOR, COMBINED Left 4/10/2015    Procedure: COMBINED OPTICAL TRACKING SYSTEM CRANIOTOMY, EXCISE TUMOR;  Surgeon: Mirlande Colmenares MD;  Location: [facility] OR     REPAIR GAMEKEEPER'S THUMB Right 12/2/2016    Procedure: REPAIR LIGAMENT ULNAR COLLATERAL THUMB (GAMEKEEPER'S);  Surgeon: Evin Zamorano MD;  Location:  OR     [facility] NONSPECIFIC PROCEDURE      right forearm fracture       Family History   Problem Relation Age of Onset     Diabetes Brother      Diabetes Father      Alzheimer Disease Father      Unknown/Adopted Mother      Diabetes Paternal Grandfather      Cancer No family hx of         no skin cancer     Skin Cancer No family hx of         no famiy hx of skin cancer     Glaucoma No family hx of      Macular Degeneration No family hx of        Social History     Tobacco Use     Smoking status: Current Every Day Smoker     Packs/day: 0.50     Years: 40.00     Pack years: 20.00     Types: Cigarettes     Smokeless tobacco: Former User   Substance Use Topics     Alcohol use: No     Alcohol/week: 0.0 standard drinks     Comment: Last etoh in 2007       No current facility-administered medications for this encounter.     Current Outpatient Medications   Medication     Alcohol Swabs PADS     bictegravir-emtricitabine-tenofovir (BIKTARVY) -25 MG per tablet     blood glucose (NO BRAND SPECIFIED) test strip     blood glucose calibration (NO BRAND SPECIFIED) solution     blood glucose monitoring (NO BRAND SPECIFIED) meter device kit     glucose 40 % (400 mg/mL) gel     insulin detemir (LEVEMIR PEN) 100 UNIT/ML pen     insulin pen needle (BD SCOTT U/F) 32G X 4 MM miscellaneous     metoclopramide (REGLAN) 5 MG tablet     NOVOLOG FLEXPEN 100 UNIT/ML soln     pantoprazole (PROTONIX) 40 MG EC tablet     polyethylene glycol (MIRALAX) 17 GM/Dose powder     SENNA-docusate sodium (SENNA S) 8.6-50 MG tablet     SENNA-docusate sodium (SENNA S) 8.6-50 MG tablet     Sharps  "Container MISC     STATIN NOT PRESCRIBED (INTENTIONAL)     thin (NO BRAND SPECIFIED) lancets     traMADol (ULTRAM) 50 MG tablet        Allergies   Allergen Reactions     Metformin      Abdominal pain     Tylenol [Acetaminophen] Itching     Dulaglutide Rash     Penicillin V Other (See Comments) and Rash     Diffuse maculopapular rash + feels \"high\", per pt.        I have reviewed the Medications, Allergies, Past Medical and Surgical History, and Social History in the Epic system.    Review of Systems   HENT: Positive for congestion and voice change (hoareseness). Negative for sore throat.    Eyes: Negative for photophobia and visual disturbance.   Respiratory: Positive for cough (dry, intermittently productive). Negative for shortness of breath.    Cardiovascular: Negative for chest pain and palpitations.   Gastrointestinal: Negative for abdominal pain, diarrhea, nausea and vomiting.   Genitourinary: Negative for dysuria and frequency.   Musculoskeletal:        Muscle spasms     Neurological: Positive for headaches. Negative for weakness and numbness.   All other systems reviewed and are negative.    A complete review of systems was performed with pertinent positives and negatives noted in the HPI, and all other systems negative.    Physical Exam   BP: 112/64  Pulse: 112  Temp: 97  F (36.1  C)  Resp: 16  Height: 162.6 cm (5' 4\")  Weight: 77.1 kg (170 lb)  SpO2: 92 %      Physical Exam  Vitals and nursing note reviewed.   Constitutional:       Appearance: He is well-developed.      Comments: Adult male, alert, reactive affect, cooperative, NAD   HENT:      Head: Normocephalic.      Mouth/Throat:      Mouth: Mucous membranes are moist.      Pharynx: No oropharyngeal exudate or posterior oropharyngeal erythema.   Eyes:      Extraocular Movements: Extraocular movements intact.      Pupils: Pupils are equal, round, and reactive to light.   Neck:      Comments: No posterior C spine TTP  Cardiovascular:      Rate and " Rhythm: Normal rate and regular rhythm.      Heart sounds: Normal heart sounds. No murmur heard.  No gallop.    Pulmonary:      Effort: Pulmonary effort is normal. No respiratory distress.      Breath sounds: Normal breath sounds. No wheezing or rales.   Abdominal:      General: Bowel sounds are normal. There is no distension.      Palpations: Abdomen is soft.      Tenderness: There is no abdominal tenderness. There is no guarding or rebound.   Musculoskeletal:      Cervical back: Neck supple.   Skin:     General: Skin is warm and dry.   Neurological:      General: No focal deficit present.      Mental Status: He is alert and oriented to person, place, and time.      GCS: GCS eye subscore is 4. GCS verbal subscore is 5. GCS motor subscore is 6.      Cranial Nerves: Cranial nerves are intact.      Sensory: Sensation is intact.      Motor: Motor function is intact.      Coordination: Coordination is intact.      Gait: Gait is intact.   Psychiatric:         Behavior: Behavior normal.      Comments: Reactive affect         ED Course          Procedures              EKG Interpretation:      Interpreted by Leticia Cruz MD  Time reviewed: 1740  Symptoms at time of EKG: none   Rhythm: normal sinus   Rate: normal  Axis: normal  Ectopy: none  Conduction: normal  ST Segments/ T Waves: No ST-T wave changes  Q Waves: none  Comparison to prior: Unchanged from 10/30/21    Clinical Impression: normal EKG           The medical record was reviewed and interpreted.  Current labs reviewed and interpreted.  Previous labs reviewed and interpreted.  Current images reviewed and interpreted: CXR - no acute pathology; HCT - no acute pathology.     Results for orders placed or performed during the hospital encounter of 11/30/21 (from the past 24 hour(s))   Symptomatic Influenza A/B & SARS-CoV2 (COVID-19) Virus PCR Multiplex Nasopharyngeal    Specimen: Nasopharyngeal; Swab   Result Value Ref Range    Influenza A PCR Negative Negative     Influenza B PCR Negative Negative    RSV PCR Negative Negative    SARS CoV2 PCR Negative Negative, Testing sent to reference lab. Results will be returned via unsolicited result    Narrative    Testing was performed using the Xpert Xpress CoV2/Flu/RSV Assay on the Cepheid GeneXpert Instrument. This test should be ordered for the detection of SARS-CoV-2 and influenza viruses in individuals who meet clinical and/or epidemiological criteria. Test performance is unknown in asymptomatic patients. This test is for in vitro diagnostic use under the FDA EUA for laboratories certified under CLIA to perform high or moderate complexity testing. This test has not been FDA cleared or approved. A negative result does not rule out the presence of PCR inhibitors in the specimen or target RNA in concentration below the limit of detection for the assay. If only one viral target is positive but coinfection with multiple targets is suspected, the sample should be re-tested with another FDA cleared, approved, or authorized test, if coinfection would change clinical management. This test was validated by the [facility] [employer]. These laboratories are certified under the Clinical  Laboratory Improvement Amendments of 1988 (CLIA-88) as qualified to perform high complexity laboratory testing.   EKG 12 lead   Result Value Ref Range    Systolic Blood Pressure  mmHg    Diastolic Blood Pressure  mmHg    Ventricular Rate 96 BPM    Atrial Rate 96 BPM    ID Interval 136 ms    QRS Duration 86 ms     ms    QTc 442 ms    P Axis 41 degrees    R AXIS -16 degrees    T Axis 28 degrees    Interpretation ECG Sinus rhythm  Normal ECG      XR Chest 2 Views    Narrative    EXAM: XR CHEST 2 VW  11/30/2021 5:55 PM     HISTORY:  cough       COMPARISON:  Chest x-ray 10/30/2021, chest CT 7/8/2021    FINDINGS  Technique: Upright PA and lateral views of the chest.     Devices: None.     Lungs: The lungs are clear.  No discernible pneumothorax.  No  definite  pleural effusion.     Cardiovascular: Heart size is within normal limits.   Pulmonary  vasculature is distinct.     Bones: No acute osseous abnormality.       Impression    IMPRESSION:   Clear lungs.    I have personally reviewed the examination and initial interpretation  and I agree with the findings.    RAE WASHINGTON MD         SYSTEM ID:  [id number]   Head CT w/o contrast    Narrative    CT HEAD W/O CONTRAST 11/30/2021 5:58 PM    History: fall, headache, eval for acute pathology     Comparison: Head CT 2/3/2021    Technique: Using multidetector thin collimation helical acquisition  technique, axial, coronal and sagittal CT images from the skull base  to the vertex were obtained without intravenous contrast.    Findings: No intracranial hemorrhage, mass effect, or midline shift.  Gray-white matter differentiation in both cerebral hemispheres is  preserved. Ventricles are proportionate to the cerebral sulci. The  basal cisterns are clear. Unchanged small calcification in the  peripheral right parietal lobe parenchyma.    Post surgical changes of left occipital craniotomy with associated  left cerebellar encephalomalacia, unchanged. Mild left maxillary sinus  mucosal thickening. Old right lamina papyracea fracture deformity.  Mastoid air cells are clear.      Impression    Impression:  No acute intracranial pathology.    I have personally reviewed the examination and initial interpretation  and I agree with the findings.    MARY SAAVEDRA MD         SYSTEM ID:  [id number]   CBC with Platelets & Differential    Narrative    The following orders were created for panel order CBC with Platelets & Differential.  Procedure                               Abnormality         Status                     ---------                               -----------         ------                     CBC with platelets and d...[[id number]]  Abnormal            Final result                 Please view results for these tests on  the individual orders.   Comprehensive metabolic panel   Result Value Ref Range    Sodium 134 133 - 144 mmol/L    Potassium 4.0 3.4 - 5.3 mmol/L    Chloride 101 94 - 109 mmol/L    Carbon Dioxide (CO2) 26 20 - 32 mmol/L    Anion Gap 7 3 - 14 mmol/L    Urea Nitrogen 18 7 - 30 mg/dL    Creatinine 0.92 0.66 - 1.25 mg/dL    Calcium 8.9 8.5 - 10.1 mg/dL    Glucose 462 (H) 70 - 99 mg/dL    Alkaline Phosphatase 162 (H) 40 - 150 U/L    AST 10 0 - 45 U/L    ALT 22 0 - 70 U/L    Protein Total 7.0 6.8 - 8.8 g/dL    Albumin 3.1 (L) 3.4 - 5.0 g/dL    Bilirubin Total 0.3 0.2 - 1.3 mg/dL    GFR Estimate >90 >60 mL/min/1.73m2   Magnesium   Result Value Ref Range    Magnesium 2.1 1.6 - 2.3 mg/dL   Troponin I   Result Value Ref Range    Troponin I High Sensitivity 4 <79 ng/L   CBC with platelets and differential   Result Value Ref Range    WBC Count 8.8 4.0 - 11.0 10e3/uL    RBC Count 4.66 4.40 - 5.90 10e6/uL    Hemoglobin 13.1 (L) 13.3 - 17.7 g/dL    Hematocrit 39.1 (L) 40.0 - 53.0 %    MCV 84 78 - 100 fL    MCH 28.1 26.5 - 33.0 pg    MCHC 33.5 31.5 - 36.5 g/dL    RDW 13.1 10.0 - 15.0 %    Platelet Count 355 150 - 450 10e3/uL    % Neutrophils 64 %    % Lymphocytes 25 %    % Monocytes 7 %    % Eosinophils 2 %    % Basophils 1 %    % Immature Granulocytes 1 %    NRBCs per 100 WBC 0 <1 /100    Absolute Neutrophils 5.7 1.6 - 8.3 10e3/uL    Absolute Lymphocytes 2.2 0.8 - 5.3 10e3/uL    Absolute Monocytes 0.6 0.0 - 1.3 10e3/uL    Absolute Eosinophils 0.2 0.0 - 0.7 10e3/uL    Absolute Basophils 0.1 0.0 - 0.2 10e3/uL    Absolute Immature Granulocytes 0.1 <=0.4 10e3/uL    Absolute NRBCs 0.0 10e3/uL   [place] Draw    Narrative    The following orders were created for panel order [place] Draw.  Procedure                               Abnormality         Status                     ---------                               -----------         ------                     Extra Blue Top Tube[[id number]]                              Final result                Extra Red Top Tube[[id number]]                               Final result                 Please view results for these tests on the individual orders.   Extra Blue Top Tube   Result Value Ref Range    Hold Specimen JIC    Extra Red Top Tube   Result Value Ref Range    Hold Specimen JIC    Glucose by meter   Result Value Ref Range    GLUCOSE BY METER POCT 378 (H) 70 - 99 mg/dL   Glucose by meter   Result Value Ref Range    GLUCOSE BY METER POCT 281 (H) 70 - 99 mg/dL     *Note: Due to a large number of results and/or encounters for the requested time period, some results have not been displayed. A complete set of results can be found in Results Review.     Medications   insulin aspart (NovoLOG) injection (RAPID ACTING) (6 Units Subcutaneous Given 11/30/21 2011)             Assessments & Plan (with Medical Decision Making)   This is a  is a 54 year old male who presents for the above complaints.  On my evaluation he is alert, cooperative, no acute distress.  He is afebrile and is currently saturating 100% on room air.  Certainly with his history need to be concerned about any sort of infectious etiology.  Covid was of course to be #1 on the differential given his exposure and immunosuppression.  Also need to consider bacterial sources of infection.  Electrolyte derangements would be possible particularly given the fact that he is complaining of multiple spots of diffuse muscle cramping/spasms which could be indicative of hypokalemia or hypomagnesemia.  With regard to his headache, he has had a prior history of CNS toxoplasmosis.  He appears really quite well-appearing at present and nontoxic but given his history I think a head CT is reasonable.  Additionally we will do a chest x-ray and an EKG given his episode of dizziness and presyncope yesterday.    Work-up reveals the following: CBC normal white count 8.8, hemoglobin is 13.1, normal platelets, CMP demonstrates normal electrolytes, normal creatinine of 0.92.   Glucose is elevated at 462, but there is no sign of acidosis. Magnesium is WNL, troponin is negative.  EKG shows no evidence of ectopy or ischemia, normal sinus rhythm with a rate of 96.  Chest x-ray read as clear by radiology, head CT shows no acute intracranial pathology.  SARS-CoV-2 PCR swab was somewhat surprisingly negative.  He is also negative for RSV and influenza A and B.  Patient was given IV fluids here in the emergency department.  He was also given a dose of subcutaneous insulin given he has hyperglycemia which is very likely chronic.  He was encouraged to try to keep his blood sugar under better control, he states he has trouble doing this because he has limited food options at the shelter.    I did discuss with him that it is possible that he could have early Covid given his multiple exposures at his shelter, though fortunately he is vaccinated.  I did advise him to seek attention and repeat testing if his symptoms do not improve.  Come back to the emergency department for worsening symptoms.  Patient verbalizes understanding.    I have reviewed the nursing notes.    I have reviewed the findings, diagnosis, plan and need for follow up with the patient.    This part of the medical record was transcribed by Katalina Hebert, Medical Scribe, from a dictation done by Leticia Cruz MD.     Discharge Medication List as of 11/30/2021  9:46 PM          Final diagnoses:   Laryngitis   Nonintractable headache, unspecified chronicity pattern, unspecified headache type   Diabetes mellitus due to underlying condition, uncontrolled, with hyperglycemia (H)       Leticia Cruz MD  11/30/2021   [facility] EMERGENCY DEPARTMENT     Leticia Cruz MD  12/01/21 0147

## 2021-12-01 LAB
ATRIAL RATE - MUSE: 96 BPM
DIASTOLIC BLOOD PRESSURE - MUSE: NORMAL MMHG
INTERPRETATION ECG - MUSE: NORMAL
P AXIS - MUSE: 41 DEGREES
PR INTERVAL - MUSE: 136 MS
QRS DURATION - MUSE: 86 MS
QT - MUSE: 350 MS
QTC - MUSE: 442 MS
R AXIS - MUSE: -16 DEGREES
SYSTOLIC BLOOD PRESSURE - MUSE: NORMAL MMHG
T AXIS - MUSE: 28 DEGREES
VENTRICULAR RATE- MUSE: 96 BPM

## 2021-12-01 NOTE — DISCHARGE INSTRUCTIONS
You have been seen in the emergency department today for your symptoms.  Your Covid test today was negative.  Your blood tests were all normal with the exception of your glucose which was high.  We have given you fluids and insulin here in the emergency department.  Your head CT and x-ray are normal.    We recommend that you continue to take all of your regular medications as directed.  If you continue to have symptoms of the next few days we recommend that you be retested for Covid.  Come back to the emergency department for worsening symptoms.

## 2021-12-12 ENCOUNTER — APPOINTMENT (OUTPATIENT)
Dept: CT IMAGING | Facility: CLINIC | Age: 56
End: 2021-12-12
Attending: EMERGENCY MEDICINE
Payer: COMMERCIAL

## 2021-12-12 ENCOUNTER — HOSPITAL ENCOUNTER (INPATIENT)
Facility: CLINIC | Age: 56
LOS: 2 days | Discharge: LEFT BEFORE DISCHARGE | End: 2021-12-15
Attending: EMERGENCY MEDICINE | Admitting: INTERNAL MEDICINE
Payer: COMMERCIAL

## 2021-12-12 DIAGNOSIS — K56.609 SMALL BOWEL OBSTRUCTION (H): ICD-10-CM

## 2021-12-12 DIAGNOSIS — Z20.822 COVID-19 RULED OUT BY LABORATORY TESTING: ICD-10-CM

## 2021-12-12 DIAGNOSIS — E87.6 HYPOPOTASSEMIA: ICD-10-CM

## 2021-12-12 LAB
ALBUMIN SERPL-MCNC: 2.4 G/DL (ref 3.4–5)
ALP SERPL-CCNC: 89 U/L (ref 40–150)
ALT SERPL W P-5'-P-CCNC: 14 U/L (ref 0–70)
ANION GAP SERPL CALCULATED.3IONS-SCNC: 6 MMOL/L (ref 3–14)
AST SERPL W P-5'-P-CCNC: 12 U/L (ref 0–45)
BASOPHILS # BLD AUTO: 0 10E3/UL (ref 0–0.2)
BASOPHILS NFR BLD AUTO: 1 %
BILIRUB SERPL-MCNC: 0.5 MG/DL (ref 0.2–1.3)
BUN SERPL-MCNC: 10 MG/DL (ref 7–30)
CA-I BLD-MCNC: 4.3 MG/DL (ref 4.4–5.2)
CALCIUM SERPL-MCNC: 6.9 MG/DL (ref 8.5–10.1)
CHLORIDE BLD-SCNC: 115 MMOL/L (ref 94–109)
CO2 SERPL-SCNC: 23 MMOL/L (ref 20–32)
CPB POCT: NO
CREAT SERPL-MCNC: 0.49 MG/DL (ref 0.66–1.25)
EOSINOPHIL # BLD AUTO: 0.2 10E3/UL (ref 0–0.7)
EOSINOPHIL NFR BLD AUTO: 2 %
ERYTHROCYTE [DISTWIDTH] IN BLOOD BY AUTOMATED COUNT: 13.1 % (ref 10–15)
FLUAV RNA SPEC QL NAA+PROBE: NEGATIVE
FLUBV RNA RESP QL NAA+PROBE: NEGATIVE
GFR SERPL CREATININE-BSD FRML MDRD: >90 ML/MIN/1.73M2
GLUCOSE BLD-MCNC: 191 MG/DL (ref 70–99)
GLUCOSE BLD-MCNC: 198 MG/DL (ref 70–99)
HCO3 BLDV-SCNC: 22 MMOL/L (ref 21–28)
HCT VFR BLD AUTO: 33.4 % (ref 40–53)
HCT VFR BLD CALC: 30 % (ref 40–53)
HGB BLD-MCNC: 10.2 G/DL (ref 13.3–17.7)
HGB BLD-MCNC: 11.2 G/DL (ref 13.3–17.7)
HOLD SPECIMEN: NORMAL
HOLD SPECIMEN: NORMAL
IMM GRANULOCYTES # BLD: 0 10E3/UL
IMM GRANULOCYTES NFR BLD: 1 %
KETONES BLD-SCNC: 0 MMOL/L (ref 0–0.6)
LACTATE SERPL-SCNC: 0.6 MMOL/L (ref 0.7–2)
LIPASE SERPL-CCNC: 83 U/L (ref 73–393)
LYMPHOCYTES # BLD AUTO: 1.5 10E3/UL (ref 0.8–5.3)
LYMPHOCYTES NFR BLD AUTO: 19 %
MAGNESIUM SERPL-MCNC: 1.6 MG/DL (ref 1.6–2.3)
MCH RBC QN AUTO: 28.1 PG (ref 26.5–33)
MCHC RBC AUTO-ENTMCNC: 33.5 G/DL (ref 31.5–36.5)
MCV RBC AUTO: 84 FL (ref 78–100)
MONOCYTES # BLD AUTO: 0.5 10E3/UL (ref 0–1.3)
MONOCYTES NFR BLD AUTO: 6 %
NEUTROPHILS # BLD AUTO: 5.8 10E3/UL (ref 1.6–8.3)
NEUTROPHILS NFR BLD AUTO: 71 %
NRBC # BLD AUTO: 0 10E3/UL
NRBC BLD AUTO-RTO: 0 /100
PCO2 BLDV: 35 MM HG (ref 40–50)
PH BLDV: 7.4 [PH] (ref 7.32–7.43)
PLATELET # BLD AUTO: 307 10E3/UL (ref 150–450)
PO2 BLDV: 40 MM HG (ref 25–47)
POTASSIUM BLD-SCNC: 2.4 MMOL/L (ref 3.4–5.3)
POTASSIUM BLD-SCNC: 2.5 MMOL/L (ref 3.4–5.3)
POTASSIUM BLD-SCNC: 2.5 MMOL/L (ref 3.4–5.3)
POTASSIUM BLD-SCNC: 2.6 MMOL/L (ref 3.4–5.3)
PROT SERPL-MCNC: 5.6 G/DL (ref 6.8–8.8)
RBC # BLD AUTO: 3.99 10E6/UL (ref 4.4–5.9)
RSV RNA SPEC NAA+PROBE: NEGATIVE
SAO2 % BLDV: 75 % (ref 94–100)
SARS-COV-2 RNA RESP QL NAA+PROBE: NEGATIVE
SODIUM BLD-SCNC: 145 MMOL/L (ref 133–144)
SODIUM SERPL-SCNC: 144 MMOL/L (ref 133–144)
WBC # BLD AUTO: 8 10E3/UL (ref 4–11)

## 2021-12-12 PROCEDURE — 74177 CT ABD & PELVIS W/CONTRAST: CPT | Mod: 26 | Performed by: RADIOLOGY

## 2021-12-12 PROCEDURE — 80053 COMPREHEN METABOLIC PANEL: CPT | Performed by: EMERGENCY MEDICINE

## 2021-12-12 PROCEDURE — 96365 THER/PROPH/DIAG IV INF INIT: CPT | Performed by: EMERGENCY MEDICINE

## 2021-12-12 PROCEDURE — 83690 ASSAY OF LIPASE: CPT | Performed by: EMERGENCY MEDICINE

## 2021-12-12 PROCEDURE — 82010 KETONE BODYS QUAN: CPT | Performed by: EMERGENCY MEDICINE

## 2021-12-12 PROCEDURE — 96376 TX/PRO/DX INJ SAME DRUG ADON: CPT | Performed by: EMERGENCY MEDICINE

## 2021-12-12 PROCEDURE — 93010 ELECTROCARDIOGRAM REPORT: CPT | Performed by: EMERGENCY MEDICINE

## 2021-12-12 PROCEDURE — 250N000009 HC RX 250: Performed by: EMERGENCY MEDICINE

## 2021-12-12 PROCEDURE — 83735 ASSAY OF MAGNESIUM: CPT | Performed by: EMERGENCY MEDICINE

## 2021-12-12 PROCEDURE — 74177 CT ABD & PELVIS W/CONTRAST: CPT

## 2021-12-12 PROCEDURE — 83605 ASSAY OF LACTIC ACID: CPT | Performed by: EMERGENCY MEDICINE

## 2021-12-12 PROCEDURE — 250N000011 HC RX IP 250 OP 636: Performed by: EMERGENCY MEDICINE

## 2021-12-12 PROCEDURE — 87637 SARSCOV2&INF A&B&RSV AMP PRB: CPT | Performed by: EMERGENCY MEDICINE

## 2021-12-12 PROCEDURE — C9803 HOPD COVID-19 SPEC COLLECT: HCPCS | Performed by: EMERGENCY MEDICINE

## 2021-12-12 PROCEDURE — 85025 COMPLETE CBC W/AUTO DIFF WBC: CPT | Performed by: EMERGENCY MEDICINE

## 2021-12-12 PROCEDURE — 84132 ASSAY OF SERUM POTASSIUM: CPT | Performed by: EMERGENCY MEDICINE

## 2021-12-12 PROCEDURE — 82330 ASSAY OF CALCIUM: CPT

## 2021-12-12 PROCEDURE — 93005 ELECTROCARDIOGRAM TRACING: CPT | Performed by: EMERGENCY MEDICINE

## 2021-12-12 PROCEDURE — 258N000003 HC RX IP 258 OP 636: Performed by: EMERGENCY MEDICINE

## 2021-12-12 PROCEDURE — 250N000013 HC RX MED GY IP 250 OP 250 PS 637: Performed by: EMERGENCY MEDICINE

## 2021-12-12 PROCEDURE — 99285 EMERGENCY DEPT VISIT HI MDM: CPT | Mod: 25 | Performed by: EMERGENCY MEDICINE

## 2021-12-12 PROCEDURE — 96361 HYDRATE IV INFUSION ADD-ON: CPT | Performed by: EMERGENCY MEDICINE

## 2021-12-12 PROCEDURE — 96375 TX/PRO/DX INJ NEW DRUG ADDON: CPT | Performed by: EMERGENCY MEDICINE

## 2021-12-12 PROCEDURE — 36415 COLL VENOUS BLD VENIPUNCTURE: CPT | Performed by: EMERGENCY MEDICINE

## 2021-12-12 RX ORDER — HYDROMORPHONE HYDROCHLORIDE 1 MG/ML
0.5 INJECTION, SOLUTION INTRAMUSCULAR; INTRAVENOUS; SUBCUTANEOUS ONCE
Status: COMPLETED | OUTPATIENT
Start: 2021-12-12 | End: 2021-12-12

## 2021-12-12 RX ORDER — IOPAMIDOL 755 MG/ML
100 INJECTION, SOLUTION INTRAVASCULAR ONCE
Status: COMPLETED | OUTPATIENT
Start: 2021-12-12 | End: 2021-12-12

## 2021-12-12 RX ORDER — SODIUM CHLORIDE 9 MG/ML
INJECTION, SOLUTION INTRAVENOUS CONTINUOUS
Status: DISCONTINUED | OUTPATIENT
Start: 2021-12-12 | End: 2021-12-13

## 2021-12-12 RX ORDER — POTASSIUM CHLORIDE 7.45 MG/ML
10 INJECTION INTRAVENOUS ONCE
Status: COMPLETED | OUTPATIENT
Start: 2021-12-12 | End: 2021-12-12

## 2021-12-12 RX ORDER — POTASSIUM CHLORIDE 750 MG/1
40 TABLET, EXTENDED RELEASE ORAL ONCE
Status: COMPLETED | OUTPATIENT
Start: 2021-12-12 | End: 2021-12-13

## 2021-12-12 RX ADMIN — HYDROMORPHONE HYDROCHLORIDE 0.5 MG: 1 INJECTION, SOLUTION INTRAMUSCULAR; INTRAVENOUS; SUBCUTANEOUS at 23:38

## 2021-12-12 RX ADMIN — SODIUM CHLORIDE 1000 ML: 9 INJECTION, SOLUTION INTRAVENOUS at 20:45

## 2021-12-12 RX ADMIN — SODIUM CHLORIDE: 9 INJECTION, SOLUTION INTRAVENOUS at 22:16

## 2021-12-12 RX ADMIN — HYDROMORPHONE HYDROCHLORIDE 0.5 MG: 1 INJECTION, SOLUTION INTRAMUSCULAR; INTRAVENOUS; SUBCUTANEOUS at 20:45

## 2021-12-12 RX ADMIN — LIDOCAINE HYDROCHLORIDE 30 ML: 20 SOLUTION ORAL; TOPICAL at 22:05

## 2021-12-12 RX ADMIN — IOPAMIDOL 100 ML: 755 INJECTION, SOLUTION INTRAVENOUS at 23:43

## 2021-12-12 RX ADMIN — POTASSIUM CHLORIDE 10 MEQ: 7.46 INJECTION, SOLUTION INTRAVENOUS at 22:17

## 2021-12-12 RX ADMIN — SODIUM CHLORIDE, PRESERVATIVE FREE 75 ML: 5 INJECTION INTRAVENOUS at 23:43

## 2021-12-12 ASSESSMENT — MIFFLIN-ST. JEOR: SCORE: 1481.72

## 2021-12-13 ENCOUNTER — APPOINTMENT (OUTPATIENT)
Dept: GENERAL RADIOLOGY | Facility: CLINIC | Age: 56
End: 2021-12-13
Attending: EMERGENCY MEDICINE
Payer: COMMERCIAL

## 2021-12-13 LAB
ANION GAP SERPL CALCULATED.3IONS-SCNC: 6 MMOL/L (ref 3–14)
ATRIAL RATE - MUSE: 82 BPM
BUN SERPL-MCNC: 12 MG/DL (ref 7–30)
CALCIUM SERPL-MCNC: 8.8 MG/DL (ref 8.5–10.1)
CHLORIDE BLD-SCNC: 106 MMOL/L (ref 94–109)
CO2 SERPL-SCNC: 26 MMOL/L (ref 20–32)
CREAT SERPL-MCNC: 0.65 MG/DL (ref 0.66–1.25)
DIASTOLIC BLOOD PRESSURE - MUSE: NORMAL MMHG
ERYTHROCYTE [DISTWIDTH] IN BLOOD BY AUTOMATED COUNT: 13.2 % (ref 10–15)
GFR SERPL CREATININE-BSD FRML MDRD: >90 ML/MIN/1.73M2
GLUCOSE BLD-MCNC: 288 MG/DL (ref 70–99)
GLUCOSE BLDC GLUCOMTR-MCNC: 258 MG/DL (ref 70–99)
GLUCOSE BLDC GLUCOMTR-MCNC: 261 MG/DL (ref 70–99)
HCT VFR BLD AUTO: 38.9 % (ref 40–53)
HGB BLD-MCNC: 12.9 G/DL (ref 13.3–17.7)
INTERPRETATION ECG - MUSE: NORMAL
LACTATE SERPL-SCNC: 0.8 MMOL/L (ref 0.7–2)
MCH RBC QN AUTO: 28.2 PG (ref 26.5–33)
MCHC RBC AUTO-ENTMCNC: 33.2 G/DL (ref 31.5–36.5)
MCV RBC AUTO: 85 FL (ref 78–100)
P AXIS - MUSE: 36 DEGREES
PLATELET # BLD AUTO: 347 10E3/UL (ref 150–450)
POTASSIUM BLD-SCNC: 3.5 MMOL/L (ref 3.4–5.3)
POTASSIUM BLD-SCNC: 3.8 MMOL/L (ref 3.4–5.3)
PR INTERVAL - MUSE: 138 MS
QRS DURATION - MUSE: 90 MS
QT - MUSE: 370 MS
QTC - MUSE: 432 MS
R AXIS - MUSE: -25 DEGREES
RBC # BLD AUTO: 4.57 10E6/UL (ref 4.4–5.9)
SODIUM SERPL-SCNC: 138 MMOL/L (ref 133–144)
SYSTOLIC BLOOD PRESSURE - MUSE: NORMAL MMHG
T AXIS - MUSE: 6 DEGREES
VENTRICULAR RATE- MUSE: 82 BPM
WBC # BLD AUTO: 9.6 10E3/UL (ref 4–11)

## 2021-12-13 PROCEDURE — 84132 ASSAY OF SERUM POTASSIUM: CPT | Performed by: STUDENT IN AN ORGANIZED HEALTH CARE EDUCATION/TRAINING PROGRAM

## 2021-12-13 PROCEDURE — 96376 TX/PRO/DX INJ SAME DRUG ADON: CPT | Performed by: EMERGENCY MEDICINE

## 2021-12-13 PROCEDURE — 85027 COMPLETE CBC AUTOMATED: CPT | Performed by: STUDENT IN AN ORGANIZED HEALTH CARE EDUCATION/TRAINING PROGRAM

## 2021-12-13 PROCEDURE — 36415 COLL VENOUS BLD VENIPUNCTURE: CPT | Performed by: STUDENT IN AN ORGANIZED HEALTH CARE EDUCATION/TRAINING PROGRAM

## 2021-12-13 PROCEDURE — 36415 COLL VENOUS BLD VENIPUNCTURE: CPT | Performed by: EMERGENCY MEDICINE

## 2021-12-13 PROCEDURE — 74018 RADEX ABDOMEN 1 VIEW: CPT | Mod: 26 | Performed by: RADIOLOGY

## 2021-12-13 PROCEDURE — 250N000013 HC RX MED GY IP 250 OP 250 PS 637: Performed by: HOSPITALIST

## 2021-12-13 PROCEDURE — 99207 PR CDG-MDM COMPONENT: MEETS MODERATE - DOWN CODED: CPT | Performed by: INTERNAL MEDICINE

## 2021-12-13 PROCEDURE — 250N000011 HC RX IP 250 OP 636: Performed by: STUDENT IN AN ORGANIZED HEALTH CARE EDUCATION/TRAINING PROGRAM

## 2021-12-13 PROCEDURE — 250N000011 HC RX IP 250 OP 636: Performed by: EMERGENCY MEDICINE

## 2021-12-13 PROCEDURE — 120N000002 HC R&B MED SURG/OB UMMC

## 2021-12-13 PROCEDURE — 99222 1ST HOSP IP/OBS MODERATE 55: CPT | Mod: AI | Performed by: INTERNAL MEDICINE

## 2021-12-13 PROCEDURE — 250N000013 HC RX MED GY IP 250 OP 250 PS 637: Performed by: STUDENT IN AN ORGANIZED HEALTH CARE EDUCATION/TRAINING PROGRAM

## 2021-12-13 PROCEDURE — 258N000003 HC RX IP 258 OP 636: Performed by: STUDENT IN AN ORGANIZED HEALTH CARE EDUCATION/TRAINING PROGRAM

## 2021-12-13 PROCEDURE — 999N000065 XR ABDOMEN PORT 1 VIEWS

## 2021-12-13 PROCEDURE — 250N000013 HC RX MED GY IP 250 OP 250 PS 637: Performed by: EMERGENCY MEDICINE

## 2021-12-13 PROCEDURE — 82310 ASSAY OF CALCIUM: CPT | Performed by: STUDENT IN AN ORGANIZED HEALTH CARE EDUCATION/TRAINING PROGRAM

## 2021-12-13 PROCEDURE — 96361 HYDRATE IV INFUSION ADD-ON: CPT | Performed by: EMERGENCY MEDICINE

## 2021-12-13 PROCEDURE — 83605 ASSAY OF LACTIC ACID: CPT | Performed by: STUDENT IN AN ORGANIZED HEALTH CARE EDUCATION/TRAINING PROGRAM

## 2021-12-13 PROCEDURE — 84132 ASSAY OF SERUM POTASSIUM: CPT | Performed by: EMERGENCY MEDICINE

## 2021-12-13 PROCEDURE — 83735 ASSAY OF MAGNESIUM: CPT | Performed by: STUDENT IN AN ORGANIZED HEALTH CARE EDUCATION/TRAINING PROGRAM

## 2021-12-13 PROCEDURE — 250N000012 HC RX MED GY IP 250 OP 636 PS 637: Performed by: INTERNAL MEDICINE

## 2021-12-13 RX ORDER — HYDROMORPHONE HYDROCHLORIDE 1 MG/ML
0.5 INJECTION, SOLUTION INTRAMUSCULAR; INTRAVENOUS; SUBCUTANEOUS ONCE
Status: COMPLETED | OUTPATIENT
Start: 2021-12-13 | End: 2021-12-13

## 2021-12-13 RX ORDER — ONDANSETRON 2 MG/ML
4 INJECTION INTRAMUSCULAR; INTRAVENOUS EVERY 6 HOURS PRN
Status: DISCONTINUED | OUTPATIENT
Start: 2021-12-13 | End: 2021-12-15 | Stop reason: HOSPADM

## 2021-12-13 RX ORDER — SODIUM CHLORIDE, SODIUM LACTATE, POTASSIUM CHLORIDE, CALCIUM CHLORIDE 600; 310; 30; 20 MG/100ML; MG/100ML; MG/100ML; MG/100ML
INJECTION, SOLUTION INTRAVENOUS CONTINUOUS
Status: DISCONTINUED | OUTPATIENT
Start: 2021-12-13 | End: 2021-12-14

## 2021-12-13 RX ORDER — PANTOPRAZOLE SODIUM 40 MG/1
40 TABLET, DELAYED RELEASE ORAL DAILY
Status: DISCONTINUED | OUTPATIENT
Start: 2021-12-13 | End: 2021-12-15 | Stop reason: HOSPADM

## 2021-12-13 RX ORDER — PROCHLORPERAZINE MALEATE 5 MG
10 TABLET ORAL EVERY 6 HOURS PRN
Status: DISCONTINUED | OUTPATIENT
Start: 2021-12-13 | End: 2021-12-15 | Stop reason: HOSPADM

## 2021-12-13 RX ORDER — ONDANSETRON 4 MG/1
4 TABLET, ORALLY DISINTEGRATING ORAL EVERY 6 HOURS PRN
Status: DISCONTINUED | OUTPATIENT
Start: 2021-12-13 | End: 2021-12-15 | Stop reason: HOSPADM

## 2021-12-13 RX ORDER — PROCHLORPERAZINE 25 MG
25 SUPPOSITORY, RECTAL RECTAL EVERY 12 HOURS PRN
Status: DISCONTINUED | OUTPATIENT
Start: 2021-12-13 | End: 2021-12-15 | Stop reason: HOSPADM

## 2021-12-13 RX ORDER — DEXTROSE MONOHYDRATE 25 G/50ML
25-50 INJECTION, SOLUTION INTRAVENOUS
Status: DISCONTINUED | OUTPATIENT
Start: 2021-12-13 | End: 2021-12-15 | Stop reason: HOSPADM

## 2021-12-13 RX ORDER — HYDROMORPHONE HYDROCHLORIDE 1 MG/ML
0.5 INJECTION, SOLUTION INTRAMUSCULAR; INTRAVENOUS; SUBCUTANEOUS
Status: DISCONTINUED | OUTPATIENT
Start: 2021-12-13 | End: 2021-12-14

## 2021-12-13 RX ORDER — POTASSIUM CHLORIDE 750 MG/1
20 TABLET, EXTENDED RELEASE ORAL ONCE
Status: COMPLETED | OUTPATIENT
Start: 2021-12-13 | End: 2021-12-13

## 2021-12-13 RX ORDER — LIDOCAINE 40 MG/G
CREAM TOPICAL
Status: DISCONTINUED | OUTPATIENT
Start: 2021-12-13 | End: 2021-12-15 | Stop reason: HOSPADM

## 2021-12-13 RX ORDER — NICOTINE POLACRILEX 4 MG
15-30 LOZENGE BUCCAL
Status: DISCONTINUED | OUTPATIENT
Start: 2021-12-13 | End: 2021-12-15 | Stop reason: HOSPADM

## 2021-12-13 RX ADMIN — POTASSIUM CHLORIDE 20 MEQ: 750 TABLET, EXTENDED RELEASE ORAL at 07:40

## 2021-12-13 RX ADMIN — POTASSIUM CHLORIDE 40 MEQ: 750 TABLET, EXTENDED RELEASE ORAL at 00:06

## 2021-12-13 RX ADMIN — BICTEGRAVIR SODIUM, EMTRICITABINE, AND TENOFOVIR ALAFENAMIDE FUMARATE 1 TABLET: 50; 200; 25 TABLET ORAL at 07:40

## 2021-12-13 RX ADMIN — SODIUM CHLORIDE, POTASSIUM CHLORIDE, SODIUM LACTATE AND CALCIUM CHLORIDE: 600; 310; 30; 20 INJECTION, SOLUTION INTRAVENOUS at 04:50

## 2021-12-13 RX ADMIN — HYDROMORPHONE HYDROCHLORIDE 0.5 MG: 1 INJECTION, SOLUTION INTRAMUSCULAR; INTRAVENOUS; SUBCUTANEOUS at 02:43

## 2021-12-13 RX ADMIN — INSULIN DETEMIR 8 UNITS: 100 INJECTION, SOLUTION SUBCUTANEOUS at 23:48

## 2021-12-13 RX ADMIN — HYDROMORPHONE HYDROCHLORIDE 0.5 MG: 1 INJECTION, SOLUTION INTRAMUSCULAR; INTRAVENOUS; SUBCUTANEOUS at 07:40

## 2021-12-13 ASSESSMENT — ACTIVITIES OF DAILY LIVING (ADL)
ADLS_ACUITY_SCORE: 9

## 2021-12-13 NOTE — ED PROVIDER NOTES
[place] [facility] DE[facility]MENT ([facility])  12/12/21 ED29  History     Chief Complaint   Patient presents with     Abdominal Pain     The history is provided by the patient and medical records.     Andrew Lockwood is a 54 year old male with a past medical history of HIV/AIDS (per patient currently undetectable), small bowel obstruction, insulin dependant type 2 diabetes, GERD, hyperlipidemia and history of appendectomy who presents to the emergency department for evaluation of abdominal pain.  Patient reports he has left upper abdominal pain that began yesterday with associated diarrhea.  He reports 5 episodes of diarrhea yesterday that were watery.  He states his last bowel movement was at 7 PM last night and now he is constipated and not passing any flatus.  He states that today he is constipated and that this is typical when he has a bowel obstruction.  He reports that since 5 AM this morning his pain has been intermittent.  Patient reports vomiting prior to arrival in the ED.  [facility] the homeless shelter and has frequent Covid exposures. He is vaccinated. He denies any drinking or illicit drug use.  He denies fever or chills.  Denies hematemesis, melena, or hematochezia.  Denies dysuria or hematuria. He denies chest pain or shortness of breath.  He denies cough.  Patient has not taken anything for his pain.  He does not report significant abdominal surgeries besides his appendectomy.  He does note he was diagnosed with laryngitis about a week ago.  Upon arrival patient's blood sugar was 299 with having taken 11 units of insulin before he left the homeless shelter.    Past Medical History  Past Medical History:   Diagnosis Date     AIDS (H)      Allergic rhinitis due to other allergen     DNS     Anal dysplasia      Chronic abdominal pain      CNS toxoplasmosis (H)      Diabetes type 2, controlled (H)      GERD (gastroesophageal reflux disease)      History of seizure      History of  substance abuse (H)      HIV (human immunodeficiency virus infection) (H)      HLD (hyperlipidemia)      Lung nodules      Periungual wart      PTSD (post-traumatic stress disorder)      Sleep apnea     doesn't use CPAP     Past Surgical History:   Procedure Laterality Date     ANOSCOPY N/A 9/9/2020    Procedure: Exam Under Anesthesia, ANOSCOPY, fulgeration of rectal fissures with Rectal Biopsies;  Surgeon: Thanh Lundberg MD;  Location: [facility] OR     COLONOSCOPY Left 1/22/2016    Procedure: COMBINED COLONOSCOPY, SINGLE OR MULTIPLE BIOPSY/POLYPECTOMY BY BIOPSY;  Surgeon: Clark Saini MD;  Location: [facility] GI     HC EXPLORE UNDESC TESTIS,INGUIN/SCROTAL       LAPAROSCOPIC APPENDECTOMY N/A 1/31/2018    Procedure: LAPAROSCOPIC APPENDECTOMY;  LAPAROSCOPIC APPENDECTOMY;  Surgeon: Dawn Holt MD;  Location: [facility] OR     LAPAROSCOPY DIAGNOSTIC (GENERAL) N/A 7/26/2016    Procedure: LAPAROSCOPY DIAGNOSTIC (GENERAL);  Surgeon: Susannah Arriaga MD;  Location: [facility] OR     LAPAROSCOPY DIAGNOSTIC (GENERAL) N/A 4/16/2018    Procedure: LAPAROSCOPY DIAGNOSTIC (GENERAL);  Diagnostic laparoscopy and lysis of adhesions;  Surgeon: Prince Dowling MD;  Location: [facility] OR     OPTICAL TRACKING SYSTEM CRANIOTOMY, EXCISE TUMOR, COMBINED Left 4/10/2015    Procedure: COMBINED OPTICAL TRACKING SYSTEM CRANIOTOMY, EXCISE TUMOR;  Surgeon: Mirlande Colmenares MD;  Location: [facility] OR     REPAIR GAMEKEEPER'S THUMB Right 12/2/2016    Procedure: REPAIR LIGAMENT ULNAR COLLATERAL THUMB (GAMEKEEPER'S);  Surgeon: Evin Zamorano MD;  Location: [facility] OR     ZZC NONSPECIFIC PROCEDURE      right forearm fracture     Alcohol Swabs PADS  bictegravir-emtricitabine-tenofovir (BIKTARVY) -25 MG per tablet  blood glucose (NO BRAND SPECIFIED) test strip  blood glucose calibration (NO BRAND SPECIFIED) solution  blood glucose monitoring (NO BRAND SPECIFIED) meter device kit  glucose 40 % (400 mg/mL) gel  insulin detemir (LEVEMIR PEN) 100  "UNIT/ML pen  insulin pen needle (BD SCOTT U/F) 32G X 4 MM miscellaneous  NOVOLOG FLEXPEN 100 UNIT/ML soln  pantoprazole (PROTONIX) 40 MG EC tablet  polyethylene glycol (MIRALAX) 17 GM/Dose powder  SENNA-docusate sodium (SENNA S) 8.6-50 MG tablet  SENNA-docusate sodium (SENNA S) 8.6-50 MG tablet  Sharps Container MISC  STATIN NOT PRESCRIBED (INTENTIONAL)  thin (NO BRAND SPECIFIED) lancets  traMADol (ULTRAM) 50 MG tablet      Allergies   Allergen Reactions     Metformin      Abdominal pain     Tylenol [Acetaminophen] Itching     Dulaglutide Rash     Penicillin V Other (See Comments) and Rash     Diffuse maculopapular rash + feels \"high\", per pt.      Family History  Family History   Problem Relation Age of Onset     Diabetes Brother      Diabetes Father      Alzheimer Disease Father      Unknown/Adopted Mother      Diabetes Paternal Grandfather      Cancer No family hx of         no skin cancer     Skin Cancer No family hx of         no famiy hx of skin cancer     Glaucoma No family hx of      Macular Degeneration No family hx of      Social History   Social History     Tobacco Use     Smoking status: Current Every Day Smoker     Packs/day: 0.50     Years: 40.00     Pack years: 20.00     Types: Cigarettes     Smokeless tobacco: Former User   Substance Use Topics     Alcohol use: No     Alcohol/week: 0.0 standard drinks     Comment: Last etoh in 2007     Drug use: Not Currently     Types: Marijuana, Methamphetamines     Comment: Sober 9 months       Past medical history, past surgical history, medications, allergies, family history, and social history were reviewed with the patient. No additional pertinent items.       Review of Systems  A complete review of systems was performed with pertinent positives and negatives noted in the HPI, and all other systems negative.    Physical Exam   BP: (!) 154/99  Pulse: 91  Temp: 98.4  F (36.9  C)  Resp: 20  Height: 162.6 cm (5' 4\")  Weight: 74.1 kg (163 lb 4.8 oz)  SpO2: 100 " %  Physical Exam  Vitals reviewed.   Constitutional:       General: He is not in acute distress.     Appearance: He is well-developed.   HENT:      Head: Normocephalic and atraumatic.   Eyes:      Extraocular Movements: Extraocular movements intact.      Conjunctiva/sclera: Conjunctivae normal.      Pupils: Pupils are equal, round, and reactive to light.   Cardiovascular:      Rate and Rhythm: Normal rate and regular rhythm.      Pulses: Normal pulses.      Heart sounds: Normal heart sounds. No murmur heard.      Pulmonary:      Effort: Pulmonary effort is normal. No respiratory distress.      Breath sounds: Normal breath sounds. No stridor. No wheezing or rales.   Abdominal:      General: Bowel sounds are normal. There is no distension.      Palpations: Abdomen is soft. There is no mass.      Tenderness: There is abdominal tenderness. There is no guarding or rebound.      Comments: Periumbilical as well as somewhat generalized tenderness.  Reports worse tenderness in the left upper quadrant.   Musculoskeletal:         General: Normal range of motion.      Cervical back: Normal range of motion and neck supple. No rigidity.   Skin:     General: Skin is warm and dry.      Capillary Refill: Capillary refill takes less than 2 seconds.      Findings: No rash.   Neurological:      General: No focal deficit present.      Mental Status: He is alert and oriented to person, place, and time.      GCS: GCS eye subscore is 4. GCS verbal subscore is 5. GCS motor subscore is 6.      Cranial Nerves: No cranial nerve deficit.      Sensory: No sensory deficit.      Motor: No weakness or abnormal muscle tone.   Psychiatric:         Mood and Affect: Mood normal.         ED Course     8:23 PM  The patient was seen and examined by Mirian Perez MD in Room 29.    Procedures            EKG Interpretation:      Interpreted by Mirian Perez MD  Time reviewed: 2225  Symptoms at time of EKG: low potassium   Rhythm: normal sinus    Rate: normal  Axis: normal  Ectopy: none  Conduction: normal  ST Segments/ T Waves: No ST-T wave changes  Q Waves: none  Comparison to prior: Unchanged from 11/30/2021    Clinical Impression: no evidence of acute ischemia         The medical record was reviewed and interpreted.  Current labs reviewed and interpreted.  Current images reviewed and interpreted: as below.  EKG reviewed and interpreted: as above.              Results for orders placed or performed during the hospital encounter of 12/12/21   CT Abdomen Pelvis w Contrast     Status: None    Narrative    EXAM: CT ABDOMEN PELVIS W CONTRAST  LOCATION: [facility] [place]  DATE/TIME: 12/12/2021 11:39 PM    INDICATION: LUQ/LLQ abdominal pain, vomiting, lack of bowel movement, hx of bowel obstructions, HIV  COMPARISON: 10/22/2021  TECHNIQUE: CT scan of the abdomen and pelvis was performed following injection of IV contrast. Multiplanar reformats were obtained. Dose reduction techniques were used.  CONTRAST: 100 ml isovue 370     FINDINGS:   LOWER CHEST: Lung bases clear.    HEPATOBILIARY: Hypodensity medial left liver near falciform ligament, possibly focal fatty infiltration unchanged.    PANCREAS: Normal.    SPLEEN: Normal.    ADRENAL GLANDS: Normal.    KIDNEYS/BLADDER: Normal.    BOWEL: Fluid-filled, distended stomach. Fluid-filled, dilated loop of mid small bowel transitioning in the right lower quadrant. Slight mesenteric congestion.    LYMPH NODES: Normal.    VASCULATURE: Unremarkable.    PELVIC ORGANS: Enlarged prostate. Penile prosthesis.    MUSCULOSKELETAL: Stable.      Impression    IMPRESSION:   1.  Mid small bowel obstruction.  2.  Fluid-filled, distended stomach.   Extra Blue Top Tube     Status: None   Result Value Ref Range    Hold Specimen JIC    Extra Red Top Tube     Status: None   Result Value Ref Range    Hold Specimen JIC    Comprehensive metabolic panel     Status: Abnormal   Result Value Ref Range     Sodium 144 133 - 144 mmol/L    Potassium 2.5 (LL) 3.4 - 5.3 mmol/L    Chloride 115 (H) 94 - 109 mmol/L    Carbon Dioxide (CO2) 23 20 - 32 mmol/L    Anion Gap 6 3 - 14 mmol/L    Urea Nitrogen 10 7 - 30 mg/dL    Creatinine 0.49 (L) 0.66 - 1.25 mg/dL    Calcium 6.9 (L) 8.5 - 10.1 mg/dL    Glucose 198 (H) 70 - 99 mg/dL    Alkaline Phosphatase 89 40 - 150 U/L    AST 12 0 - 45 U/L    ALT 14 0 - 70 U/L    Protein Total 5.6 (L) 6.8 - 8.8 g/dL    Albumin 2.4 (L) 3.4 - 5.0 g/dL    Bilirubin Total 0.5 0.2 - 1.3 mg/dL    GFR Estimate >90 >60 mL/min/1.73m2   Lipase     Status: Normal   Result Value Ref Range    Lipase 83 73 - 393 U/L   Lactic acid whole blood     Status: Abnormal   Result Value Ref Range    Lactic Acid 0.6 (L) 0.7 - 2.0 mmol/L    Potassium 2.5 (LL) 3.4 - 5.3 mmol/L   Ketone Beta-Hydroxybutyrate Quantitative     Status: Normal   Result Value Ref Range    Ketone (Beta-Hydroxybutyrate) Quantitative 0.0 0.0 - 0.6 mmol/L   Magnesium     Status: Normal   Result Value Ref Range    Magnesium 1.6 1.6 - 2.3 mg/dL   Symptomatic Influenza A/B & SARS-CoV2 (COVID-19) Virus PCR Multiplex Nasopharyngeal     Status: Normal    Specimen: Nasopharyngeal; Swab   Result Value Ref Range    Influenza A PCR Negative Negative    Influenza B PCR Negative Negative    RSV PCR Negative Negative    SARS CoV2 PCR Negative Negative, Testing sent to reference lab. Results will be returned via unsolicited result    Narrative    Testing was performed using the Xpert Xpress CoV2/Flu/RSV Assay on the Cepheid GeneXpert Instrument. This test should be ordered for the detection of SARS-CoV-2 and influenza viruses in individuals who meet clinical and/or epidemiological criteria. Test performance is unknown in asymptomatic patients. This test is for in vitro diagnostic use under the FDA EUA for laboratories certified under CLIA to perform high or moderate complexity testing. This test has not been FDA cleared or approved. A negative result does not rule  out the presence of PCR inhibitors in the specimen or target RNA in concentration below the limit of detection for the assay. If only one viral target is positive but coinfection with multiple targets is suspected, the sample should be re-tested with another FDA cleared, approved, or authorized test, if coinfection would change clinical management. This test was validated by the [facility]. These laboratories are certified under the Clinical  Laboratory Improvement Amendments of 1988 (CLIA-88) as qualified to perform high complexity laboratory testing.   CBC with platelets and differential     Status: Abnormal   Result Value Ref Range    WBC Count 8.0 4.0 - 11.0 10e3/uL    RBC Count 3.99 (L) 4.40 - 5.90 10e6/uL    Hemoglobin 11.2 (L) 13.3 - 17.7 g/dL    Hematocrit 33.4 (L) 40.0 - 53.0 %    MCV 84 78 - 100 fL    MCH 28.1 26.5 - 33.0 pg    MCHC 33.5 31.5 - 36.5 g/dL    RDW 13.1 10.0 - 15.0 %    Platelet Count 307 150 - 450 10e3/uL    % Neutrophils 71 %    % Lymphocytes 19 %    % Monocytes 6 %    % Eosinophils 2 %    % Basophils 1 %    % Immature Granulocytes 1 %    NRBCs per 100 WBC 0 <1 /100    Absolute Neutrophils 5.8 1.6 - 8.3 10e3/uL    Absolute Lymphocytes 1.5 0.8 - 5.3 10e3/uL    Absolute Monocytes 0.5 0.0 - 1.3 10e3/uL    Absolute Eosinophils 0.2 0.0 - 0.7 10e3/uL    Absolute Basophils 0.0 0.0 - 0.2 10e3/uL    Absolute Immature Granulocytes 0.0 <=0.4 10e3/uL    Absolute NRBCs 0.0 10e3/uL   iStat Gases Electrolytes ICA Glucose Venous, POCT     Status: Abnormal   Result Value Ref Range    CPB Applied No     Hematocrit POCT 30 (L) 40 - 53 %    Calcium, Ionized Whole Blood POCT 4.3 (L) 4.4 - 5.2 mg/dL    Glucose Whole Blood POCT 191 (H) 70 - 99 mg/dL    Bicarbonate Venous POCT 22 21 - 28 mmol/L    Hemoglobin POCT 10.2 (L) 13.3 - 17.7 g/dL    Potassium POCT 2.4 (LL) 3.4 - 5.3 mmol/L    Sodium POCT 145 (H) 133 - 144 mmol/L    pCO2 Venous POCT 35 (L) 40 - 50 mm Hg    pO2 Venous POCT 40 25 - 47 mm  Hg    pH Venous POCT 7.40 7.32 - 7.43    O2 Sat, Venous POCT 75 (L) 94 - 100 %   Potassium     Status: Abnormal   Result Value Ref Range    Potassium 2.6 (LL) 3.4 - 5.3 mmol/L   EKG 12 lead     Status: None (Preliminary result)   Result Value Ref Range    Systolic Blood Pressure  mmHg    Diastolic Blood Pressure  mmHg    Ventricular Rate 82 BPM    Atrial Rate 82 BPM    NM Interval 138 ms    QRS Duration 90 ms     ms    QTc 432 ms    P Axis 36 degrees    R AXIS -25 degrees    T Axis 6 degrees    Interpretation ECG       Sinus rhythm  Cannot rule out Anterior infarct , age undetermined  Abnormal ECG     [place] Draw     Status: None (In process)    Narrative    The following orders were created for panel order [place] Draw.  Procedure                               Abnormality         Status                     ---------                               -----------         ------                     Extra Blue Top Tube[[id number]]                              Final result               Extra Red Top Tube[[id number]]                               Final result               Extra Green Top (Lithium...[[id number]]                                                 Extra Purple Top Tube[[id number]]                                                       Extra Green Top (Lithium...[[id number]]                                                   Please view results for these tests on the individual orders.   CBC with platelets differential     Status: Abnormal    Narrative    The following orders were created for panel order CBC with platelets differential.  Procedure                               Abnormality         Status                     ---------                               -----------         ------                     CBC with platelets and d...[[id number]]  Abnormal            Final result                 Please view results for these tests on the individual orders.     Medications   0.9% sodium chloride BOLUS (0 mLs  Intravenous Stopped 12/12/21 2239)     Followed by   sodium chloride 0.9% infusion ( Intravenous New Bag 12/12/21 2216)   HYDROmorphone (PF) (DILAUDID) injection 0.5 mg (0.5 mg Intravenous Given 12/12/21 2045)   lidocaine (viscous) (XYLOCAINE) 2 % 15 mL, alum & mag hydroxide-simethicone (MAALOX) 15 mL GI Cocktail (30 mLs Oral Given 12/12/21 2205)   potassium chloride 10 mEq in 100 mL sterile water intermittent infusion (premix) (0 mEq Intravenous Stopped 12/12/21 2339)   HYDROmorphone (PF) (DILAUDID) injection 0.5 mg (0.5 mg Intravenous Given 12/12/21 2338)   potassium chloride ER (KLOR-CON M) CR tablet 40 mEq (40 mEq Oral Given 12/13/21 0006)   iopamidol (ISOVUE-370) solution 100 mL (100 mLs Intravenous Given 12/12/21 2343)   sodium chloride 0.9 % bag 500mL for CT scan flush use (75 mLs Intravenous Given 12/12/21 2343)        Assessments & Plan (with Medical Decision Making)   Patient presents with abdominal pain, vomiting and now lack of bowel movement with history of small bowel obstruction in the past.  On exam, he is tender in the periumbilical region but also somewhat generally.  No signs of peritonitis.  Vital signs are within normal limits and he is afebrile.  Differential diagnosis includes but is not limited to bowel obstruction, constipation, gastritis, pancreatitis, COVID-19, other viral syndrome, gastroenteritis, DKA, UTI, kidney stone.    Laboratory studies were obtained.  IV access was established and the patient was given IV Dilaudid as well as IV fluids.  He also requested a GI cocktail which was given.  CT scan of the abdomen and pelvis was ordered.    CBC reveals white blood cell count of 8 with a hemoglobin of 11.2.  CMP revealed a potassium of 2.5 with normal creatinine and liver function.  Glucose is 198.  Bicarb is normal.  Iron gap is also normal.  No sign of DKA.  Ketones are 0.  Lactic acid was within normal limits at 0.6.  Lipase is also within normal limits at 83.  Magnesium is 1.6.   Potassium replacement was started with 10 mEq of potassium chloride IV as well as 40 mEq of oral potassium chloride via tablet.  He does have reported Covid exposure at the homeless shelter where he is and thus a COVID-19 PCR was obtained.  This was negative for influenza, RSV, and COVID-19.  I-STAT VBG revealed a pH of 7.4 with PCO2 of 35 and bicarb of 22.  EKG was obtained due to low potassium which does not reveal any evidence of acute ischemia and is unchanged from prior.  CT reveals a mid small bowel obstruction with distended stomach.  Discussed NG tube with the patient who agreed.  Also discussed the patient with surgery who came to evaluate the patient.  Recommended plan for conservative management and admission to the medicine service.  Patient was in agreement with this plan.  NG tube placed and confirmed by KUB. Patient was admitted to the medicine service in stable condition.  Repeat potassium is also pending at this time and the medicine team will follow up on this result.    I have reviewed the nursing notes. I have reviewed the findings, diagnosis, plan and need for follow up with the patient.    New Prescriptions    No medications on file       Final diagnoses:   Small bowel obstruction (H)     IJeniffer, am serving as a trained medical scribe to document services personally performed by Mirian Perez MD based on the provider's statements to me on December 12, 2021.  This document has been checked and approved by the attending provider.    I, Mirian Perez MD, was physically present and have reviewed and verified the accuracy of this note documented by Jeniffer Orellana medical scribe.      Mirian Perez MD  --  Mirian Perez MD  [facility] EMERGENCY DEPARTMENT  12/12/2021     Mirian Perez MD  12/13/21 030

## 2021-12-13 NOTE — H&P
[facility]  History and Physical - [facility]  Date of Admission:  12/12/2021      Assessment & Plan   Andrew Lockwood is a 54 year old male with history of recurrent SBOs, HIV, CNS toxoplasmosis, and T2DM who presents with nausea, vomiting, and abdominal pain admitted for management of acute partial small bowel obstruction.    Partial SBO  Hx recurrent SBOs in setting of prior abdominal surgeries  Patient reports last BM was the evening of 12/11 though is still passing gas. CT shows dilated loops of small bowel with a RLQ transition point.  - General surgery following  - NG to LIS  - NPO  - Maintenance IVF  - Symptom control    Hypokalemia  Potassium 2.5on presentation. Patient had reported a couple days of frequent diarrhea prior to his bowel movements stopping that likely explain this.  - S/p 10 mEq IV + 40 mEq PO in the ED  - Replace with goal >4 per surgery recs    HIV  Diagnosed in 2015, follows with Dr. Ivory.  - Continue pta biktarvy    Type 2 DM  Poorly controlled, last A1c was 10.6 in 10/2021. Managed with insulin, reports that his AM blood sugars are usually in the 200s.  - Hold long-acting insulin while NPO with bowel obstruction  - Fingersticks, hypoglycemia protocol  - Medium [facility]      Hospital checklist:  Diet:   NPO  DVT Prophylaxis: SCDs  Potts Catheter: Not present  Fluids: LR at 125 ml/hr  Central Lines: None  Code Status:   Full code      Clinically Significant Risk Factors Present on Admission        # Hypokalemia: K = 2.6 mmol/L (Ref range: 3.4 - 5.3 mmol/L) on admission, will replace as needed  # Hypernatremia: Na = 145 mmol/L (Ref range: 133 - 144 mmol/L) on admission, will monitor as appropriate             Disposition Plan   Expected Discharge: 3-4 days   Anticipated discharge location:  Awaiting care coordination huddle  Delays:   ability to tolerate PO          This patient will be formally staffed in the AM.    Kathy Titus  MD  Internal Medicine, PGY-3  [facility]  Securely message with the [employer] Web Console (learn more here)  Text page via [employer] Paging/Directory    Please see sign in/sign out for up to date coverage information  ______________________________________________________________________    Chief Complaint   Abdominal pain    History is obtained from the patient    History of Present Illness   Andrew Lockwood is a 54 year old male who presents with abdominal pain, nausea, and vomiting.    Patient was in his usual state of health until Saturday when he developed abdominal pain and diarrhea. Had several episodes of watery diarrhea throughout the day, last BM around 7pm that night. Became constipated on Sunday and had worsening abdominal pain that was similar to prior bowel obstructions so he came to the ED. He has a history of recurrent SBOs as well as previous abdominal surgeries including ex-laps for these obstructions. In the ED his he was hemodynamically stable but reported emesis immediately prior to arrival. CT abdomen showed SBO and NG tube was placed to LIS. On admission evaluation, the patient reports that much of his abdominal pressure has been relived since the tube was placed, but he continues to endorse significant nausea and severe pain that is mostly in the LLQ. He denies fevers, chills, chest pain, difficulty breathing, or other concerns at this time.    Review of Systems    The 10 point Review of Systems is negative other than noted in the HPI.    Past Medical History    I have reviewed this patient's medical history and updated it with pertinent information if needed.   Past Medical History:   Diagnosis Date    AIDS (H)     Allergic rhinitis due to other allergen     DNS    Anal dysplasia     Chronic abdominal pain     CNS toxoplasmosis (H)     Diabetes type 2, controlled (H)     GERD (gastroesophageal reflux disease)     History of seizure      History of substance abuse (H)     HIV (human immunodeficiency virus infection) (H)     HLD (hyperlipidemia)     Lung nodules     Periungual wart     PTSD (post-traumatic stress disorder)     Sleep apnea     doesn't use CPAP     Past Surgical History   I have reviewed this patient's surgical history and updated it with pertinent information if needed.  Past Surgical History:   Procedure Laterality Date    ANOSCOPY N/A 9/9/2020    Procedure: Exam Under Anesthesia, ANOSCOPY, fulgeration of rectal fissures with Rectal Biopsies;  Surgeon: Thanh Lundberg MD;  Location: [facility] OR    COLONOSCOPY Left 1/22/2016    Procedure: COMBINED COLONOSCOPY, SINGLE OR MULTIPLE BIOPSY/POLYPECTOMY BY BIOPSY;  Surgeon: Clark Saini MD;  Location: [facility] GI    HC EXPLORE UNDESC TESTIS,INGUIN/SCROTAL      LAPAROSCOPIC APPENDECTOMY N/A 1/31/2018    Procedure: LAPAROSCOPIC APPENDECTOMY;  LAPAROSCOPIC APPENDECTOMY;  Surgeon: Dawn Holt MD;  Location: [facility] OR    LAPAROSCOPY DIAGNOSTIC (GENERAL) N/A 7/26/2016    Procedure: LAPAROSCOPY DIAGNOSTIC (GENERAL);  Surgeon: Susannah Arriaga MD;  Location: [facility] OR    LAPAROSCOPY DIAGNOSTIC (GENERAL) N/A 4/16/2018    Procedure: LAPAROSCOPY DIAGNOSTIC (GENERAL);  Diagnostic laparoscopy and lysis of adhesions;  Surgeon: Prince Dowling MD;  Location: [facility] OR    OPTICAL TRACKING SYSTEM CRANIOTOMY, EXCISE TUMOR, COMBINED Left 4/10/2015    Procedure: COMBINED OPTICAL TRACKING SYSTEM CRANIOTOMY, EXCISE TUMOR;  Surgeon: Mirlande Colmenares MD;  Location: [facility] OR    REPAIR GAMEKEEPER'S THUMB Right 12/2/2016    Procedure: REPAIR LIGAMENT ULNAR COLLATERAL THUMB (GAMEKEEPER'S);  Surgeon: Evin Zamorano MD;  Location:  OR    [facility] NONSPECIFIC PROCEDURE      right forearm fracture     Social History   Patient is currently homeless for the last 3 years, resides in homeless shelter.  He shares that he has been working on getting approved for an apartment.  Social History     Tobacco Use     Smoking status: Current Every Day Smoker     Packs/day: 0.50     Years: 40.00     Pack years: 20.00     Types: Cigarettes    Smokeless tobacco: Former User   Substance Use Topics    Alcohol use: No     Alcohol/week: 0.0 standard drinks     Comment: Last etoh in 2007    Drug use: Not Currently     Types: Marijuana, Methamphetamines     Comment: Sober 9 months        Family History   I have reviewed this patient's family history and updated it with pertinent information if needed.  Family History   Problem Relation Age of Onset    Diabetes Brother     Diabetes Father     Alzheimer Disease Father     Unknown/Adopted Mother     Diabetes Paternal Grandfather     Cancer No family hx of         no skin cancer    Skin Cancer No family hx of         no famiy hx of skin cancer    Glaucoma No family hx of     Macular Degeneration No family hx of        Prior to Admission Medications   Prior to Admission Medications   Prescriptions Last Dose Informant Patient Reported? Taking?   Alcohol Swabs PADS  Self No No   Sig: Use to swab the area of the injection or todd as directed Per insurance coverage   NOVOLOG FLEXPEN 100 UNIT/ML soln  Self No No   Sig: Inject 10 Units Subcutaneous 3 times daily (with meals)   SENNA-docusate sodium (SENNA S) 8.6-50 MG tablet  Self No No   Sig: Take 1 tablet by mouth 2 times daily Hold for loose stools.   Patient not taking: Reported on 10/22/2021   SENNA-docusate sodium (SENNA S) 8.6-50 MG tablet   No No   Sig: Take 1 tablet by mouth 2 times daily as needed (constipation)   STATIN NOT PRESCRIBED (INTENTIONAL)  Self No No   Sig: Please choose reason not prescribed, below   Sharps Container MISC  Self No No   Sig: Use as directed to dispose of needles, lancets and other sharps. Per Insurance coverage   bictegravir-emtricitabine-tenofovir (BIKTARVY) -25 MG per tablet  Self No No   Sig: Take 1 tablet by mouth daily   blood glucose (NO BRAND SPECIFIED) test strip  Self No No   Sig: Use to test  "blood sugar 4 times daily or as directed. To accompany:whatever is covered   blood glucose calibration (NO BRAND SPECIFIED) solution  Self No No   Sig: Used to calibrate the blood glucose monitor as needed and as directed.  To accompany  blood glucose brands per insurance coverage   blood glucose monitoring (NO BRAND SPECIFIED) meter device kit  Self No No   Sig: Use as directed. Per insurance coverage.   glucose 40 % (400 mg/mL) gel  Self No No   Sig: 15 g every 15 minutes by mouth as needed for low blood sugar. Oral gel is preferable for conscious and able to swallow patient.   insulin detemir (LEVEMIR PEN) 100 UNIT/ML pen  Self No No   Sig: Inject 18 Units Subcutaneous 2 times daily   insulin pen needle (BD SCOTT U/F) 32G X 4 MM miscellaneous  Self No No   Sig: USE 3 TO 4 NEEDLES DAILY   pantoprazole (PROTONIX) 40 MG EC tablet  Self No No   Sig: Take 1 tablet (40 mg) by mouth daily   polyethylene glycol (MIRALAX) 17 GM/Dose powder  Self No No   Sig: Take 17 g by mouth daily as needed   thin (NO BRAND SPECIFIED) lancets  Self No No   Sig: Use with lanceting device. To accompany: Test 4 times daily with whatever is covered   traMADol (ULTRAM) 50 MG tablet  Self No No   Sig: Take 1 tablet (50 mg) by mouth every 6 hours as needed for moderate pain      Facility-Administered Medications: None     Allergies   Allergies   Allergen Reactions    Metformin      Abdominal pain    Tylenol [Acetaminophen] Itching    Dulaglutide Rash    Penicillin V Other (See Comments) and Rash     Diffuse maculopapular rash + feels \"high\", per pt.        Physical Exam   Vital Signs: Temp: 98.4  F (36.9  C) Temp src: Oral BP: (!) 154/99 Pulse: 91   Resp: 20 SpO2: 100 % O2 Device: None (Room air)    Weight: 163 lbs 4.8 oz    Constitutional: lying in bed, non-toxic appearing, no acute distress  Eyes: anicteric, EOMI  ENT: oropharynx clear, MMM, NGT in place  Respiratory: breathing non-labored on room air, CTAB, no crackles or " wheezing  Cardiovascular: RRR, normal S1 and S2, no murmur noted  GI: non-distended, bowel sounds absent, diffuse tenderness but without rebound  Skin: no rashes or concerning lesions noted  Musculoskeletal: no lower extremity edema present  Neurologic: alert and oriented, moving all extremities equally  Neuropsychiatric: calm, normal eye contact, affect normal      Data   Data reviewed today: I reviewed all medications, new labs and imaging results over the last 24 hours.    Recent Labs   Lab 12/12/21 2153 12/12/21 2109   WBC  --  8.0   HGB 10.2* 11.2*   MCV  --  84   PLT  --  307       Recent Labs   Lab 12/12/21 2208 12/12/21 2153 12/12/21 2109   NA  --  145* 144   POTASSIUM 2.6* 2.4* 2.5*  2.5*   CHLORIDE  --   --  115*   CO2  --   --  23   BUN  --   --  10   CR  --   --  0.49*   GFRESTIMATED  --   --  >90   LINDA  --   --  6.9*   MAG  --   --  1.6       Recent Labs   Lab 12/12/21 2109   PROTTOTAL 5.6*   ALBUMIN 2.4*   BILITOTAL 0.5   AST 12   ALT 14   ALKPHOS 89       Recent Results (from the past 24 hour(s))   CT Abdomen Pelvis w Contrast    Narrative    EXAM: CT ABDOMEN PELVIS W CONTRAST  LOCATION: [facility]  DATE/TIME: 12/12/2021 11:39 PM    INDICATION: LUQ/LLQ abdominal pain, vomiting, lack of bowel movement, hx of bowel obstructions, HIV  COMPARISON: 10/22/2021  TECHNIQUE: CT scan of the abdomen and pelvis was performed following injection of IV contrast. Multiplanar reformats were obtained. Dose reduction techniques were used.  CONTRAST: 100 ml isovue 370     FINDINGS:   LOWER CHEST: Lung bases clear.    HEPATOBILIARY: Hypodensity medial left liver near falciform ligament, possibly focal fatty infiltration unchanged.    PANCREAS: Normal.    SPLEEN: Normal.    ADRENAL GLANDS: Normal.    KIDNEYS/BLADDER: Normal.    BOWEL: Fluid-filled, distended stomach. Fluid-filled, dilated loop of mid small bowel transitioning in the right lower quadrant. Slight  mesenteric congestion.    LYMPH NODES: Normal.    VASCULATURE: Unremarkable.    PELVIC ORGANS: Enlarged prostate. Penile prosthesis.    MUSCULOSKELETAL: Stable.      Impression    IMPRESSION:   1.  Mid small bowel obstruction.  2.  Fluid-filled, distended stomach.   XR Abdomen Port 1 View    Narrative    EXAM: XR ABDOMEN PORT 1 VIEWS  LOCATION: [facility]  DATE/TIME: 12/13/2021 2:32 AM    INDICATION: eval NG tube placement  COMPARISON: 12/12/2021      Impression    IMPRESSION: Enteric tube tip in distal stomach. The stomach is distended. Contrast in renal collecting system. Dilated small bowel on CT not well visualized on this exam of the upper abdomen.       Internal Medicine Staff Addendum  Date of Service: 12/13/2021    I have seen and examined this patient, reviewed the data and discussed the plan of care. I agree with the above documentation including plan and ddx unless otherwise stated:     Ailyn Grijalva MD  Internal Medicine Hospitalist  [facility]  Attending pager: [phone]

## 2021-12-13 NOTE — CONSULTS
Emergency General Surgery Consultation    Andrew Lockwood  MRN#: [id number]    Date of Admission:  12/12/2021    Date of Consult: 12/13/2021    Reason for consult: Concern for SBO   Requesting service: ED - Dr. Perez          Assessment and Plan:   Assessment:   54 year old male with PMH of recurrent SBO (s/p appendectomy, diagnostic laparoscopies), HIV, CNS Toxoplasmosis, chronic abdominal pain, T2DM, seizures, GERD, HLD, with significant gastric distension and CT imaging / presentation with partial recurrent SBO.      Plan:   Admit to Medicine Primary  NG tube placement for decompression if patient has continued nausea/emesis  Bowel rest, monitor for ROBF  NPO with IVF  Electrolyte repletion as needed  Abdominal exam monitoring  Appreciate Primary medicine cares    EGS team to see in AM    Discussed with EGS chief Dr. Patricio to d/w with EGS staff     Denver Oro MD  General Surgery, PGY-2  Department of Surgery          Chief Complaint:   Abdominal pain with nausea / vomiting         History of Present Illness:   Andrew Lockwood is a 54 year old male with PMH of recurrent SBO (s/p diagnostic laparoscopy and ANASTASIIA), with HIV, CNS Toxoplasmosis, prior neurosurgical tumor excision (2015), chronic abdominal pain, T2DM, seizures, GERD, HLD, with recurrent SBO.    Per EMR review, has significant history of recurrent small bowel obstructions (starting in 2016, most recently 3/7/20, 11/11/20 and last 7/13/21) with a history of appendectomy and 2 laparoscopies with lysis of adhesions (last 04/2018). Historically had been followed by CRS and GI  teams with multidisciplinary work-up for recurrent SBO of unknown etiology. Patient was lost to follow-up around 2020 when considering repeat MRE in the course of his work-up. GI was consulted during this inpatient admission and has placed referral for outpatient GI follow up SBO, possible MRE, repeat EGD/colonoscopy, and follow up with CRS clinic, but missed follow up  appointments.     Patient reports he has left upper abdominal pain that began yesterday with associated diarrhea.  He reports 5 episodes of diarrhea yesterday that were watery.  He states his last bowel movement was at 7 PM last night. He states that he is currently passing gas. He states that today he is constipated and that this is typical when he has a bowel obstruction.  He reports that since 5 AM this morning his pain has been intermittent.  Patient reports vomiting prior to arrival in the ED. He currently lives at the homeless shelter and has frequent COVID exposures.  He denies any drinking or illicit drug use.  He denies fever or chills.  Denies blood in his vomit or stool.  Denies dysuria or hematuria.  He denies chest pain or shortness of breath.  He denies cough. Patient recently received pain medications. Has history of prior appendectomy (2018, Dr. Holt) with prior diagnostic laparoscopies for SBO. He does not want surgical intervention if possible, and does not think that he needs a NG tube at this time, as states that his nausea and vomiting are resolving.    In ED, vitals WNL. K 2.6. Lactate 0.6. WBC 8.0. Hgb 11.2. CT imaging significant for fluid-filled, distended stomach. Fluid-filled, dilated loop of mid small bowel transitioning in the right lower quadrant. Slight mesenteric congestion. ED attempted to perform NG tube placement, which the patient refused.         Past Medical History:     Past Medical History:   Diagnosis Date     AIDS (H)      Allergic rhinitis due to other allergen     DNS     Anal dysplasia      Chronic abdominal pain      CNS toxoplasmosis (H)      Diabetes type 2, controlled (H)      GERD (gastroesophageal reflux disease)      History of seizure      History of substance abuse (H)      HIV (human immunodeficiency virus infection) (H)      HLD (hyperlipidemia)      Lung nodules      Periungual wart      PTSD (post-traumatic stress disorder)      Sleep apnea     doesn't  use CPAP             Past Surgical History:     Past Surgical History:   Procedure Laterality Date     ANOSCOPY N/A 9/9/2020    Procedure: Exam Under Anesthesia, ANOSCOPY, fulgeration of rectal fissures with Rectal Biopsies;  Surgeon: Thanh Lundberg MD;  Location: [facility] OR     COLONOSCOPY Left 1/22/2016    Procedure: COMBINED COLONOSCOPY, SINGLE OR MULTIPLE BIOPSY/POLYPECTOMY BY BIOPSY;  Surgeon: Clark Saini MD;  Location: [facility] GI     HC EXPLORE UNDESC TESTIS,INGUIN/SCROTAL       LAPAROSCOPIC APPENDECTOMY N/A 1/31/2018    Procedure: LAPAROSCOPIC APPENDECTOMY;  LAPAROSCOPIC APPENDECTOMY;  Surgeon: Dawn Holt MD;  Location: [facility] OR     LAPAROSCOPY DIAGNOSTIC (GENERAL) N/A 7/26/2016    Procedure: LAPAROSCOPY DIAGNOSTIC (GENERAL);  Surgeon: Susannah Arriaga MD;  Location: [facility] OR     LAPAROSCOPY DIAGNOSTIC (GENERAL) N/A 4/16/2018    Procedure: LAPAROSCOPY DIAGNOSTIC (GENERAL);  Diagnostic laparoscopy and lysis of adhesions;  Surgeon: Prince Dowling MD;  Location: [facility] OR     OPTICAL TRACKING SYSTEM CRANIOTOMY, EXCISE TUMOR, COMBINED Left 4/10/2015    Procedure: COMBINED OPTICAL TRACKING SYSTEM CRANIOTOMY, EXCISE TUMOR;  Surgeon: Mirlande Colmenares MD;  Location: [facility] OR     REPAIR GAMEKEEPER'S THUMB Right 12/2/2016    Procedure: REPAIR LIGAMENT ULNAR COLLATERAL THUMB (GAMEKEEPER'S);  Surgeon: Evin Zamorano MD;  Location: [facility] OR     ZZC NONSPECIFIC PROCEDURE      right forearm fracture             Social History:   Tobacco: Current every day smoker  EtOH: No, quit 2007  Other drug use: None  Homeless, lives in shelters in [facility]    Social History     Tobacco Use     Smoking status: Current Every Day Smoker     Packs/day: 0.50     Years: 40.00     Pack years: 20.00     Types: Cigarettes     Smokeless tobacco: Former User   Substance Use Topics     Alcohol use: No     Alcohol/week: 0.0 standard drinks     Comment: Last etoh in 2007             Family History:  "    Negative for bleeding disorders, clotting disorders, or problems with anesthesia.    Family History   Problem Relation Age of Onset     Diabetes Brother      Diabetes Father      Alzheimer Disease Father      Unknown/Adopted Mother      Diabetes Paternal Grandfather      Cancer No family hx of         no skin cancer     Skin Cancer No family hx of         no famiy hx of skin cancer     Glaucoma No family hx of      Macular Degeneration No family hx of                 Allergies:     Allergies   Allergen Reactions     Metformin      Abdominal pain     Tylenol [Acetaminophen] Itching     Dulaglutide Rash     Penicillin V Other (See Comments) and Rash     Diffuse maculopapular rash + feels \"high\", per pt.              Medications:     No current facility-administered medications on file prior to encounter.  Alcohol Swabs PADS, Use to swab the area of the injection or todd as directed Per insurance coverage  bictegravir-emtricitabine-tenofovir (BIKTARVY) -25 MG per tablet, Take 1 tablet by mouth daily  blood glucose (NO BRAND SPECIFIED) test strip, Use to test blood sugar 4 times daily or as directed. To accompany:whatever is covered  blood glucose calibration (NO BRAND SPECIFIED) solution, Used to calibrate the blood glucose monitor as needed and as directed.  To accompany  blood glucose brands per insurance coverage  blood glucose monitoring (NO BRAND SPECIFIED) meter device kit, Use as directed. Per insurance coverage.  glucose 40 % (400 mg/mL) gel, 15 g every 15 minutes by mouth as needed for low blood sugar. Oral gel is preferable for conscious and able to swallow patient.  insulin detemir (LEVEMIR PEN) 100 UNIT/ML pen, Inject 18 Units Subcutaneous 2 times daily  insulin pen needle (BD SCOTT U/F) 32G X 4 MM miscellaneous, USE 3 TO 4 NEEDLES DAILY  NOVOLOG FLEXPEN 100 UNIT/ML soln, Inject 10 Units Subcutaneous 3 times daily (with meals)  pantoprazole (PROTONIX) 40 MG EC tablet, Take 1 tablet (40 mg) by " mouth daily  polyethylene glycol (MIRALAX) 17 GM/Dose powder, Take 17 g by mouth daily as needed  SENNA-docusate sodium (SENNA S) 8.6-50 MG tablet, Take 1 tablet by mouth 2 times daily as needed (constipation)  SENNA-docusate sodium (SENNA S) 8.6-50 MG tablet, Take 1 tablet by mouth 2 times daily Hold for loose stools. (Patient not taking: Reported on 10/22/2021)  Sharps Container MISC, Use as directed to dispose of needles, lancets and other sharps. Per Insurance coverage  STATIN NOT PRESCRIBED (INTENTIONAL), Please choose reason not prescribed, below  thin (NO BRAND SPECIFIED) lancets, Use with lanceting device. To accompany: Test 4 times daily with whatever is covered  traMADol (ULTRAM) 50 MG tablet, Take 1 tablet (50 mg) by mouth every 6 hours as needed for moderate pain              Review of Systems:   10-point ROS otherwise negative except as noted above.          Physical Exam:     Temp:  [98.4  F (36.9  C)] 98.4  F (36.9  C)  Pulse:  [91] 91  Resp:  [20] 20  BP: (154)/(99) 154/99  SpO2:  [100 %] 100 %     General: AAOx4, NAD, lying comfortably in bed  Neuro: notable facial droop  CV: regular rate and rhythm, warm, well-perfused  Pulm: no dyspnea, breathing comfortably on RA  Abd: soft, moderately distended, mildly TTP LUQ and LLQ, no rebound tenderness  Extremities: no edema  Neuro: moving all extremities without deficit    No intake/output data recorded.          Data:   Labs:  Arterial Blood Gases   No lab results found in last 7 days.     Complete Blood Count   Recent Labs   Lab 12/12/21 2153 12/12/21 2109   WBC  --  8.0   HGB 10.2* 11.2*   PLT  --  307       Basic Metabolic Panel  Recent Labs   Lab 12/12/21 2208 12/12/21 2153 12/12/21 2109   NA  --  145* 144   POTASSIUM 2.6* 2.4* 2.5*  2.5*   CHLORIDE  --   --  115*   CO2  --   --  23   BUN  --   --  10   CR  --   --  0.49*   GLC  --  191* 198*   LINDA  --   --  6.9*   MAG  --   --  1.6       Liver Function Tests  Recent Labs   Lab 12/12/21  2533    AST 12   ALT 14   ALKPHOS 89   BILITOTAL 0.5   ALBUMIN 2.4*       Pancreatic Enzymes  Recent Labs   Lab 12/12/21 2109   LIPASE 83       Coagulation Profile  No lab results found in last 7 days.    Lactate  Recent Labs   Lab 12/12/21 2109   LACT 0.6*       Imaging:   Results for orders placed or performed during the hospital encounter of 12/12/21   CT Abdomen Pelvis w Contrast    Narrative    EXAM: CT ABDOMEN PELVIS W CONTRAST  LOCATION: [facility]  DATE/TIME: 12/12/2021 11:39 PM    INDICATION: LUQ/LLQ abdominal pain, vomiting, lack of bowel movement, hx of bowel obstructions, HIV  COMPARISON: 10/22/2021  TECHNIQUE: CT scan of the abdomen and pelvis was performed following injection of IV contrast. Multiplanar reformats were obtained. Dose reduction techniques were used.  CONTRAST: 100 ml isovue 370     FINDINGS:   LOWER CHEST: Lung bases clear.    HEPATOBILIARY: Hypodensity medial left liver near falciform ligament, possibly focal fatty infiltration unchanged.    PANCREAS: Normal.    SPLEEN: Normal.    ADRENAL GLANDS: Normal.    KIDNEYS/BLADDER: Normal.    BOWEL: Fluid-filled, distended stomach. Fluid-filled, dilated loop of mid small bowel transitioning in the right lower quadrant. Slight mesenteric congestion.    LYMPH NODES: Normal.    VASCULATURE: Unremarkable.    PELVIC ORGANS: Enlarged prostate. Penile prosthesis.    MUSCULOSKELETAL: Stable.      Impression    IMPRESSION:   1.  Mid small bowel obstruction.  2.  Fluid-filled, distended stomach.     *Note: Due to a large number of results and/or encounters for the requested time period, some results have not been displayed. A complete set of results can be found in Results Review.

## 2021-12-13 NOTE — ED TRIAGE NOTES
Patient BIBA c/o abd pain starting this am. Pt reports diarrhea x5 episodes and constipation today. He states he's vomited 5 times today. Pt has hx of multiple bowel obstructions and states symptoms are very similar to symptoms of obstruction in the past. . 4mg zofran en route.

## 2021-12-13 NOTE — PROGRESS NOTES
"General Surgery Progress Note  12/13/2021    Continues to have cramping abdominal pain and bloating. NG tube placed with bilious output. States that he does not want surgery.    BP (!) 138/91   Pulse 71   Temp 98.4  F (36.9  C) (Oral)   Resp 20   Ht 1.626 m (5' 4\")   Wt 74.1 kg (163 lb 4.8 oz)   SpO2 95%   BMI 28.03 kg/m    NAD, comfortable at rest  NG tube in place, bilious output  Nontachycardic  Non-labored breathing  Abdomen soft, moderately distended, nontender  Moves all extremities    I/O last 3 completed shifts:  In: -   Out: 950 [Emesis/NG output:950]    Andrew Lockwood is a 54 year old male with history of recurrent small bowel obstructions s/p appendectomy and multiple laparoscopic ANASTASIIA now with small bowel obstruction with a transition point in the right lower quadrant on CT.  -- Continue NG decompression  -- IVF  -- Gastrografin study today  -- Surgery will follow    Seen and discussed with Dr. Beltran.    Mick Patricio MD  PGY-5, General Surgery    "

## 2021-12-13 NOTE — PROGRESS NOTES
Resident/Fellow Attestation   I, Carrie Jimenez, was present with the medical/K[employer] student who participated in the service and in the documentation of the note.  I have verified the history and personally performed the physical exam and medical decision making.  I agree with the assessment and plan of care as documented in the note, edits made where appropriate.    Carrie Jimenez, DO  PGY2  Date of Service (when I saw the patient): 12/13/21    [facility]    Progress Note - Maroon 5 Service        Date of Admission:  12/12/2021    Assessment & Plan           Andrew Lockwood is a 54 year old male with history of recurrent SBOs, HIV, CNS toxoplasmosis, and T2DM who presents with nausea, vomiting, and abdominal pain admitted for management of acute partial small bowel obstruction.      Partial SBO  Hx recurrent SBOs in setting of prior abdominal surgeries  Patient reports last BM was the evening of 12/11 though is still passing gas. CT shows dilated loops of small bowel with a RLQ transition point.  - General surgery following with plan for gastrografin study in the AM.  - NG to LIS  - NPO  - Maintenance IVF  - pain management with IV dilaudid 0.5 q3hr PRN.   - IV Zofran PRN.     Hypokalemia  Potassium 2.5 on presentation. Patient had reported a couple days of frequent diarrhea prior to his bowel movements stopping that likely explain this.   - S/p 10 mEq IV + 40 mEq PO in the ED  - Replace with goal >4 per surgery recs     HIV  Diagnosed in 2015, follows with Dr. Ivory.  - Continue pta biktarvy     Type 2 DM  Poorly controlled, last A1c was 10.6 in 10/2021. Managed with insulin, reports that his AM blood sugars are usually in the 200s.  - Hold long-acting insulin while NPO with bowel obstruction  - Fingersticks, hypoglycemia protocol  - 8 units insulin detemir twice daily.   - Medium SSI       Diet: NPO for Medical/Clinical Reasons Except for: Meds    DVT  Prophylaxis: Pneumatic Compression Devices  Potts Catheter: Not present  Fluids: LR 125ml/hr.  Central Lines: None  Code Status: Full Code      Disposition Plan   Expected Discharge: 12/16/2021     Anticipated discharge location:  Awaiting care coordination huddle         The patient's care was discussed with the Attending Physician, Dr. Grijalva .    JONATHAN, [facility]  Medical Student  [address]  Securely message with the Vocera Web Console (learn more here)  Text page via [facility] Paging/Directory    Please see sign in/sign out for up to date coverage information    Clinically Significant Risk Factors Present on Admission                    ______________________________________________________________________    Interval History   Overnight had no episodes of N/V, reports abdominal pain mostly on the LLQ, pain 8/10. Otherwise no chest pain, no sob.    Physical Exam   Vital Signs: Temp: 98.4  F (36.9  C) Temp src: Oral BP: 122/75 Pulse: 85   Resp: 20 SpO2: 97 % O2 Device: None (Room air)    Weight: 163 lbs 4.8 oz  Constitutional: awake, alert, no apparent distress, NG tube in place draining brownish fluid.  Respiratory: No increased work of breathing, good air exchange, clear to auscultation bilaterally, no crackles or wheezing  Cardiovascular:  regular rate and rhythm, normal S1 and S2, no murmur noted  GI:  soft, non-distended, non-tender, no masses palpated, no hepatosplenomegally  Musculoskeletal: no lower extremity pitting edema present  Neurologic: Awake, alert, oriented x3.  Data   Recent Labs   Lab 12/13/21  1016 12/13/21  0835 12/13/21  0256 12/12/21  2208 12/12/21  2153 12/12/21  2109   WBC 9.6  --   --   --   --  8.0   HGB 12.9*  --   --   --  10.2* 11.2*   MCV 85  --   --   --   --  84     --   --   --   --  307     --   --   --  145* 144   POTASSIUM 3.8  --  3.5 2.6* 2.4* 2.5*  2.5*   CHLORIDE 106  --   --   --   --  115*   CO2 26   --   --   --   --  23   BUN 12  --   --   --   --  10   CR 0.65*  --   --   --   --  0.49*   ANIONGAP 6  --   --   --   --  6   LINDA 8.8  --   --   --   --  6.9*   * 258*  --   --  191* 198*   ALBUMIN  --   --   --   --   --  2.4*   PROTTOTAL  --   --   --   --   --  5.6*   BILITOTAL  --   --   --   --   --  0.5   ALKPHOS  --   --   --   --   --  89   ALT  --   --   --   --   --  14   AST  --   --   --   --   --  12   LIPASE  --   --   --   --   --  83     Recent Results (from the past 24 hour(s))   CT Abdomen Pelvis w Contrast    Narrative    EXAM: CT ABDOMEN PELVIS W CONTRAST  LOCATION: [facility]  DATE/TIME: 12/12/2021 11:39 PM    INDICATION: LUQ/LLQ abdominal pain, vomiting, lack of bowel movement, hx of bowel obstructions, HIV  COMPARISON: 10/22/2021  TECHNIQUE: CT scan of the abdomen and pelvis was performed following injection of IV contrast. Multiplanar reformats were obtained. Dose reduction techniques were used.  CONTRAST: 100 ml isovue 370     FINDINGS:   LOWER CHEST: Lung bases clear.    HEPATOBILIARY: Hypodensity medial left liver near falciform ligament, possibly focal fatty infiltration unchanged.    PANCREAS: Normal.    SPLEEN: Normal.    ADRENAL GLANDS: Normal.    KIDNEYS/BLADDER: Normal.    BOWEL: Fluid-filled, distended stomach. Fluid-filled, dilated loop of mid small bowel transitioning in the right lower quadrant. Slight mesenteric congestion.    LYMPH NODES: Normal.    VASCULATURE: Unremarkable.    PELVIC ORGANS: Enlarged prostate. Penile prosthesis.    MUSCULOSKELETAL: Stable.      Impression    IMPRESSION:   1.  Mid small bowel obstruction.  2.  Fluid-filled, distended stomach.   XR Abdomen Port 1 View    Narrative    EXAM: XR ABDOMEN PORT 1 VIEWS  LOCATION: [facility]  DATE/TIME: 12/13/2021 2:32 AM    INDICATION: eval NG tube placement  COMPARISON: 12/12/2021      Impression    IMPRESSION:  Enteric tube tip in distal stomach. The stomach is distended. Contrast in renal collecting system. Dilated small bowel on CT not well visualized on this exam of the upper abdomen.       Internal Medicine Staff Addendum  Date of Service: 12/14/2021    I have seen and examined this patient, reviewed the data and discussed the plan of care. I agree with the above documentation including plan and ddx unless otherwise stated:     #    Marcio Grijalva MD  Internal Medicine Hospitalist  [facility]  Attending pager: [phone]

## 2021-12-13 NOTE — ED NOTES
Bed: ED29  Expected date:   Expected time:   Means of arrival:   Comments:  H451 abd pain poss bowel obstruction

## 2021-12-14 ENCOUNTER — APPOINTMENT (OUTPATIENT)
Dept: CT IMAGING | Facility: CLINIC | Age: 56
End: 2021-12-14
Payer: COMMERCIAL

## 2021-12-14 VITALS
BODY MASS INDEX: 24.13 KG/M2 | WEIGHT: 159.2 LBS | SYSTOLIC BLOOD PRESSURE: 116 MMHG | DIASTOLIC BLOOD PRESSURE: 74 MMHG | OXYGEN SATURATION: 97 % | HEART RATE: 73 BPM | HEIGHT: 68 IN | RESPIRATION RATE: 16 BRPM | TEMPERATURE: 99.2 F

## 2021-12-14 LAB
ALBUMIN SERPL-MCNC: 3 G/DL (ref 3.4–5)
ALP SERPL-CCNC: 105 U/L (ref 40–150)
ALT SERPL W P-5'-P-CCNC: 17 U/L (ref 0–70)
ANION GAP SERPL CALCULATED.3IONS-SCNC: 7 MMOL/L (ref 3–14)
AST SERPL W P-5'-P-CCNC: 12 U/L (ref 0–45)
BILIRUB SERPL-MCNC: 0.8 MG/DL (ref 0.2–1.3)
BUN SERPL-MCNC: 10 MG/DL (ref 7–30)
CALCIUM SERPL-MCNC: 8.6 MG/DL (ref 8.5–10.1)
CHLORIDE BLD-SCNC: 106 MMOL/L (ref 94–109)
CO2 SERPL-SCNC: 24 MMOL/L (ref 20–32)
CREAT SERPL-MCNC: 0.6 MG/DL (ref 0.66–1.25)
ERYTHROCYTE [DISTWIDTH] IN BLOOD BY AUTOMATED COUNT: 13.2 % (ref 10–15)
GFR SERPL CREATININE-BSD FRML MDRD: >90 ML/MIN/1.73M2
GLUCOSE BLD-MCNC: 282 MG/DL (ref 70–99)
GLUCOSE BLDC GLUCOMTR-MCNC: 204 MG/DL (ref 70–99)
GLUCOSE BLDC GLUCOMTR-MCNC: 224 MG/DL (ref 70–99)
GLUCOSE BLDC GLUCOMTR-MCNC: 253 MG/DL (ref 70–99)
GLUCOSE BLDC GLUCOMTR-MCNC: 300 MG/DL (ref 70–99)
HCT VFR BLD AUTO: 39.7 % (ref 40–53)
HGB BLD-MCNC: 12.8 G/DL (ref 13.3–17.7)
LACTATE SERPL-SCNC: 0.7 MMOL/L (ref 0.7–2)
MAGNESIUM SERPL-MCNC: 2 MG/DL (ref 1.6–2.3)
MCH RBC QN AUTO: 28.1 PG (ref 26.5–33)
MCHC RBC AUTO-ENTMCNC: 32.2 G/DL (ref 31.5–36.5)
MCV RBC AUTO: 87 FL (ref 78–100)
PLATELET # BLD AUTO: 314 10E3/UL (ref 150–450)
POTASSIUM BLD-SCNC: 3.9 MMOL/L (ref 3.4–5.3)
PROT SERPL-MCNC: 6.6 G/DL (ref 6.8–8.8)
RBC # BLD AUTO: 4.56 10E6/UL (ref 4.4–5.9)
SODIUM SERPL-SCNC: 137 MMOL/L (ref 133–144)
WBC # BLD AUTO: 7.3 10E3/UL (ref 4–11)

## 2021-12-14 PROCEDURE — 250N000012 HC RX MED GY IP 250 OP 636 PS 637: Performed by: STUDENT IN AN ORGANIZED HEALTH CARE EDUCATION/TRAINING PROGRAM

## 2021-12-14 PROCEDURE — 250N000011 HC RX IP 250 OP 636: Performed by: STUDENT IN AN ORGANIZED HEALTH CARE EDUCATION/TRAINING PROGRAM

## 2021-12-14 PROCEDURE — 250N000013 HC RX MED GY IP 250 OP 250 PS 637: Performed by: STUDENT IN AN ORGANIZED HEALTH CARE EDUCATION/TRAINING PROGRAM

## 2021-12-14 PROCEDURE — 96376 TX/PRO/DX INJ SAME DRUG ADON: CPT | Performed by: EMERGENCY MEDICINE

## 2021-12-14 PROCEDURE — 70486 CT MAXILLOFACIAL W/O DYE: CPT

## 2021-12-14 PROCEDURE — 70486 CT MAXILLOFACIAL W/O DYE: CPT | Mod: 26 | Performed by: RADIOLOGY

## 2021-12-14 PROCEDURE — 83605 ASSAY OF LACTIC ACID: CPT | Performed by: STUDENT IN AN ORGANIZED HEALTH CARE EDUCATION/TRAINING PROGRAM

## 2021-12-14 PROCEDURE — 99232 SBSQ HOSP IP/OBS MODERATE 35: CPT | Mod: GC | Performed by: STUDENT IN AN ORGANIZED HEALTH CARE EDUCATION/TRAINING PROGRAM

## 2021-12-14 PROCEDURE — 99207 PR CDG-MDM COMPONENT: MEETS MODERATE - DOWN CODED: CPT | Performed by: STUDENT IN AN ORGANIZED HEALTH CARE EDUCATION/TRAINING PROGRAM

## 2021-12-14 PROCEDURE — 36415 COLL VENOUS BLD VENIPUNCTURE: CPT | Performed by: STUDENT IN AN ORGANIZED HEALTH CARE EDUCATION/TRAINING PROGRAM

## 2021-12-14 PROCEDURE — 85027 COMPLETE CBC AUTOMATED: CPT | Performed by: STUDENT IN AN ORGANIZED HEALTH CARE EDUCATION/TRAINING PROGRAM

## 2021-12-14 PROCEDURE — 120N000002 HC R&B MED SURG/OB UMMC

## 2021-12-14 RX ORDER — HYDROMORPHONE HYDROCHLORIDE 1 MG/ML
0.5 INJECTION, SOLUTION INTRAMUSCULAR; INTRAVENOUS; SUBCUTANEOUS EVERY 6 HOURS PRN
Status: DISCONTINUED | OUTPATIENT
Start: 2021-12-14 | End: 2021-12-15 | Stop reason: HOSPADM

## 2021-12-14 RX ORDER — LIDOCAINE HYDROCHLORIDE AND EPINEPHRINE 10; 10 MG/ML; UG/ML
5 INJECTION, SOLUTION INFILTRATION; PERINEURAL ONCE
Status: DISCONTINUED | OUTPATIENT
Start: 2021-12-14 | End: 2021-12-15 | Stop reason: HOSPADM

## 2021-12-14 RX ORDER — DEXTROSE MONOHYDRATE 25 G/50ML
25-50 INJECTION, SOLUTION INTRAVENOUS
Status: DISCONTINUED | OUTPATIENT
Start: 2021-12-14 | End: 2021-12-15 | Stop reason: HOSPADM

## 2021-12-14 RX ORDER — CHLORHEXIDINE GLUCONATE ORAL RINSE 1.2 MG/ML
15 SOLUTION DENTAL 4 TIMES DAILY
Status: DISCONTINUED | OUTPATIENT
Start: 2021-12-14 | End: 2021-12-15 | Stop reason: HOSPADM

## 2021-12-14 RX ORDER — NICOTINE POLACRILEX 4 MG
15-30 LOZENGE BUCCAL
Status: DISCONTINUED | OUTPATIENT
Start: 2021-12-14 | End: 2021-12-15 | Stop reason: HOSPADM

## 2021-12-14 RX ADMIN — HYDROMORPHONE HYDROCHLORIDE 0.5 MG: 1 INJECTION, SOLUTION INTRAMUSCULAR; INTRAVENOUS; SUBCUTANEOUS at 00:00

## 2021-12-14 RX ADMIN — HYDROMORPHONE HYDROCHLORIDE 0.5 MG: 1 INJECTION, SOLUTION INTRAMUSCULAR; INTRAVENOUS; SUBCUTANEOUS at 08:25

## 2021-12-14 RX ADMIN — HYDROMORPHONE HYDROCHLORIDE 0.5 MG: 1 INJECTION, SOLUTION INTRAMUSCULAR; INTRAVENOUS; SUBCUTANEOUS at 11:53

## 2021-12-14 RX ADMIN — INSULIN ASPART 1 UNITS: 100 INJECTION, SOLUTION INTRAVENOUS; SUBCUTANEOUS at 20:38

## 2021-12-14 RX ADMIN — BICTEGRAVIR SODIUM, EMTRICITABINE, AND TENOFOVIR ALAFENAMIDE FUMARATE 1 TABLET: 50; 200; 25 TABLET ORAL at 10:15

## 2021-12-14 RX ADMIN — HYDROMORPHONE HYDROCHLORIDE 0.5 MG: 1 INJECTION, SOLUTION INTRAMUSCULAR; INTRAVENOUS; SUBCUTANEOUS at 19:48

## 2021-12-14 RX ADMIN — CHLORHEXIDINE GLUCONATE 15 ML: 1.2 SOLUTION ORAL at 19:47

## 2021-12-14 RX ADMIN — CHLORHEXIDINE GLUCONATE 15 ML: 1.2 SOLUTION ORAL at 17:58

## 2021-12-14 ASSESSMENT — ACTIVITIES OF DAILY LIVING (ADL)
ADLS_ACUITY_SCORE: 9
ADLS_ACUITY_SCORE: 5
ADLS_ACUITY_SCORE: 5
ADLS_ACUITY_SCORE: 9
ADLS_ACUITY_SCORE: 5
ADLS_ACUITY_SCORE: 9
ADLS_ACUITY_SCORE: 5
ADLS_ACUITY_SCORE: 9
ADLS_ACUITY_SCORE: 5

## 2021-12-14 ASSESSMENT — MIFFLIN-ST. JEOR: SCORE: 1526.63

## 2021-12-14 NOTE — CONSULTS
Dental Service Consultation          Andrew Lockwood MRN# [id number]  YOB: 1967 Age: 54 year old  Date of Admission: 12/12/2021    Reason for consult: I was asked by Dr. Coombs to evaluate this patient for poor dentition and pain from a broken tooth.     Assessment and Plan:  Assessment:   - Dental CT reveals partially edentulous adult dentition. Condyles seated in fossa bilaterally, maxillary sinuses clear bilaterally. #14 large coronal radiolucency consistent with caries and fracture on the mesial cusp. Periapical radiolucency consistent with periapical abscess #14 mesial root. Large periapical radiolucency consistent with periapical abscess #31 with existing stainless steel crown.   - Extraoral exam: no swellings or facial asymmetry   - Intra oral exam: generalized restoration on multiple teeth.    #14 large occlusal caries with fracture on MB cusp with mobility on loose portion    #31 existing stainless steel crown        Plan:   -Recommend Chlorhexidine 0.12% mouthrinse. Rinse and spit out after every meal and before going to bed at night.  -Due to severe mobility of coronal portion #14 due to fracture. Recommend to remove the mobile piece immediately to keep pt more comfortable during hospital stay. Pt will need to return to existing dentist for definitive treatment #14 and 31 after being discharged from the hospital.   -Possible treatment: extractions #14 and 31  -Clinic information:   [facility]  [address], [place] [address] [phone]    -Pt wanted to get implants but informed patient that during the hospital stay, we can see pt for emergent dental procedure but he should discuss definitive treatment for replacement of teeth with his existing dentist after he is discharged. Also, he would need to take care of all infections related to dentition and periodontium before replacement of teeth. We are not considering any implants or definitive treatment. Pt  "understood.     -Procedure:   -Local anesthesia: 1%lidocaine with 1:100K epi via local infiltration. Profound anesthesia achieved.  -Removed loose portion with hemostats  -Gauze hemostasis achieved.         Chief Complaint:  \"I broke a tooth.\" Pt reports that he broke a tooth about 2 days ago. He reports pain in that tooth for a while and said he was clenching when the broke.       History of Present Illness:  This patient is a 54 year old male who presented to [facility] ED and admitted to [facility] with abdominal pain and lack of bowel movement. Past medical history of HIV/AIDS (per patient currently undetectable), small bowel obstruction, insulin dependant type 2 diabetes, GERD, hyperlipidemia and history of appendectomy.         Past Medical History:  Past Medical History:   Diagnosis Date    AIDS (H)     Allergic rhinitis due to other allergen     DNS    Anal dysplasia     Chronic abdominal pain     CNS toxoplasmosis (H)     Diabetes type 2, controlled (H)     GERD (gastroesophageal reflux disease)     History of seizure     History of substance abuse (H)     HIV (human immunodeficiency virus infection) (H)     HLD (hyperlipidemia)     Lung nodules     Periungual wart     PTSD (post-traumatic stress disorder)     Sleep apnea     doesn't use CPAP       Past Surgical History:  Past Surgical History:   Procedure Laterality Date    ANOSCOPY N/A 9/9/2020    Procedure: Exam Under Anesthesia, ANOSCOPY, fulgeration of rectal fissures with Rectal Biopsies;  Surgeon: Thanh Lundberg MD;  Location: [facility] OR    COLONOSCOPY Left 1/22/2016    Procedure: COMBINED COLONOSCOPY, SINGLE OR MULTIPLE BIOPSY/POLYPECTOMY BY BIOPSY;  Surgeon: Clark Saini MD;  Location:  GI    HC EXPLORE UNDESC TESTIS,INGUIN/SCROTAL      LAPAROSCOPIC APPENDECTOMY N/A 1/31/2018    Procedure: LAPAROSCOPIC APPENDECTOMY;  LAPAROSCOPIC APPENDECTOMY;  Surgeon: Dawn Holt MD;  Location: [facility] OR    LAPAROSCOPY DIAGNOSTIC " (GENERAL) N/A 7/26/2016    Procedure: LAPAROSCOPY DIAGNOSTIC (GENERAL);  Surgeon: Susannah Arriaga MD;  Location: [facility] OR    LAPAROSCOPY DIAGNOSTIC (GENERAL) N/A 4/16/2018    Procedure: LAPAROSCOPY DIAGNOSTIC (GENERAL);  Diagnostic laparoscopy and lysis of adhesions;  Surgeon: Prince Dowling MD;  Location: [facility] OR    OPTICAL TRACKING SYSTEM CRANIOTOMY, EXCISE TUMOR, COMBINED Left 4/10/2015    Procedure: COMBINED OPTICAL TRACKING SYSTEM CRANIOTOMY, EXCISE TUMOR;  Surgeon: Mirlande Colmenares MD;  Location: [facility] OR    REPAIR GAMEKEEPER'S THUMB Right 12/2/2016    Procedure: REPAIR LIGAMENT ULNAR COLLATERAL THUMB (GAMEKEEPER'S);  Surgeon: Evin Zamorano MD;  Location: [facility] OR    ZC NONSPECIFIC PROCEDURE      right forearm fracture       Social History:  Social History     Tobacco Use    Smoking status: Current Every Day Smoker     Packs/day: 0.50     Years: 40.00     Pack years: 20.00     Types: Cigarettes    Smokeless tobacco: Former User   Substance Use Topics    Alcohol use: No     Alcohol/week: 0.0 standard drinks     Comment: Last etoh in 2007       Family History:  Family History   Problem Relation Age of Onset    Diabetes Brother     Diabetes Father     Alzheimer Disease Father     Unknown/Adopted Mother     Diabetes Paternal Grandfather     Cancer No family hx of         no skin cancer    Skin Cancer No family hx of         no famiy hx of skin cancer    Glaucoma No family hx of     Macular Degeneration No family hx of        Immunizations:  Immunization History   Administered Date(s) Administered    HepB 03/10/2016, 06/16/2016    HepB-Adult 08/31/2017    Influenza (IIV3) PF 10/17/2016    Influenza Quad, Recombinant, pf(RIV4) (Flublok) 01/17/2020, 09/28/2020    Influenza Vaccine IM > 6 months Valent IIV4 (Alfuria,Fluzone) 12/22/2015, 12/14/2017    Mantoux Tuberculin Skin Test 04/12/2015    Meningococcal (Menactra ) 08/31/2015, 03/10/2016    Pneumo Conj 13-V (2010&after) 03/10/2016    Pneumococcal 23  "valent 06/16/2016    TDAP Vaccine (Boostrix) 10/17/2016       Allergies:  Allergies   Allergen Reactions    Metformin      Abdominal pain    Tylenol [Acetaminophen] Itching    Dulaglutide Rash    Penicillin V Other (See Comments) and Rash     Diffuse maculopapular rash + feels \"high\", per pt.        Medications:  Current Facility-Administered Medications Ordered in Epic   Medication Dose Route Frequency Last Rate Last Admin    bictegravir-emtricitabine-tenofovir (BIKTARVY) -25 MG per tablet 1 tablet  1 tablet Oral Daily   1 tablet at 12/14/21 1015    glucose gel 15-30 g  15-30 g Oral Q15 Min PRN        Or    dextrose 50 % injection 25-50 mL  25-50 mL Intravenous Q15 Min PRN        Or    glucagon injection 1 mg  1 mg Subcutaneous Q15 Min PRN        HYDROmorphone (PF) (DILAUDID) injection 0.5 mg  0.5 mg Intravenous Q3H PRN   0.5 mg at 12/14/21 1153    insulin detemir (LEVEMIR PEN) injection 12 Units  12 Units Subcutaneous BID   12 Units at 12/14/21 1015    lactated ringers infusion   Intravenous Continuous 125 mL/hr at 12/14/21 0829 Restarted at 12/14/21 0829    lidocaine (LMX4) cream   Topical Q1H PRN        lidocaine 1 % 0.1-1 mL  0.1-1 mL Other Q1H PRN        ondansetron (ZOFRAN-ODT) ODT tab 4 mg  4 mg Oral Q6H PRN        Or    ondansetron (ZOFRAN) injection 4 mg  4 mg Intravenous Q6H PRN        [Held by provider] pantoprazole (PROTONIX) EC tablet 40 mg  40 mg Oral Daily        prochlorperazine (COMPAZINE) injection 10 mg  10 mg Intravenous Q6H PRN        Or    prochlorperazine (COMPAZINE) tablet 10 mg  10 mg Oral Q6H PRN        Or    prochlorperazine (COMPAZINE) suppository 25 mg  25 mg Rectal Q12H PRN        sodium chloride (PF) 0.9% PF flush 3 mL  3 mL Intracatheter Q8H   3 mL at 12/13/21 0747    sodium chloride (PF) 0.9% PF flush 3 mL  3 mL Intracatheter q1 min prn         Current Outpatient Medications Ordered in Epic   Medication    bictegravir-emtricitabine-tenofovir (BIKTARVY) -25 MG per tablet "    glucose 40 % (400 mg/mL) gel    insulin detemir (LEVEMIR PEN) 100 UNIT/ML pen    NOVOLOG FLEXPEN 100 UNIT/ML soln    pantoprazole (PROTONIX) 40 MG EC tablet    polyethylene glycol (MIRALAX) 17 GM/Dose powder    traMADol (ULTRAM) 50 MG tablet    Alcohol Swabs PADS    blood glucose (NO BRAND SPECIFIED) test strip    blood glucose calibration (NO BRAND SPECIFIED) solution    blood glucose monitoring (NO BRAND SPECIFIED) meter device kit    insulin pen needle (BD SCOTT U/F) 32G X 4 MM miscellaneous    SENNA-docusate sodium (SENNA S) 8.6-50 MG tablet    SENNA-docusate sodium (SENNA S) 8.6-50 MG tablet    Sharps Container MISC    STATIN NOT PRESCRIBED (INTENTIONAL)    thin (NO BRAND SPECIFIED) lancets       Review of Systems:        The 10 point Review of Systems is negative other than noted in the HPI    Physical Exam:  Vitals were reviewed  Temp: 98.3  F (36.8  C) Temp src: Oral BP: 112/77 Pulse: 92   Resp: 16 SpO2: 99 %            Head and neck exam:    - Extraoral exam: no swellings or facial asymmetry   HEENT: NC/AT, EOMI, PERRL,no lymphadenopathy observed, no induration or erythema,  MAHESH 30mm. No TMJ discomfort bilaterally.       Intraoral exam:  - Intra oral exam: generalized restoration on multiple teeth.    #14 large occlusal caries with fracture on MB cusp with mobility on loose portion    #19 existing stainless steel crown  - Dental CT reveals partially edentulous adult dentition. Condyles seated in fossa bilaterally, maxillary sinuses clear bilaterally. #14 large coronal radiolucency consistent with caries and fracture on the mesial cusp. Periapical radiolucency consistent with periapical abscess #14 mesial root. Large periapical radiolucency consistent with periapical abscess #31 with existing stainless steel crown.     Data:  Radiographic interpretation: Dental CT taken on 12/14/2021  Osseous pathology: none   Pulpal Pathology: pulp necrosis #14 and #31  Periodontal Pathology: not evaluated   Caries:  atleast #14-O  Odontogenic pathology: none        The patient was discussed with: Dr. Tommy Whitaker PGY1  Pager: [phone]

## 2021-12-14 NOTE — PROGRESS NOTES
[facility]    Progress Note - Cristina 5 Service        Date of Admission:  12/12/2021    Assessment & Plan               Andrew Lockwood is a 54 year old male with history of recurrent SBOs, HIV, CNS toxoplasmosis, and T2DM who presents with nausea, vomiting, and abdominal pain admitted for management of acute partial small bowel obstruction.     Changes today:  -Removed NGT  -Advance diet as tolerated  -Started MSSI  -Stopped IVF  -Likely discharge tomorrow to previous living arrangements tomorrow  -Dental hygiene consult today      Partial SBO  Hx recurrent SBOs in setting of prior abdominal surgeries  Patient reports last BM was the evening of 12/11 though is still passing gas. CT shows dilated loops of small bowel with a RLQ transition point.  - General surgery following  - Discontinue NGT after successful trial of clamping NG with 0 output  - ADAT  - Discontinued Maintenance IVF  - Wean dilaudid for pain control     Hypokalemia  Potassium 2.5on presentation. Patient had reported a couple days of frequent diarrhea prior to his bowel movements stopping that likely explain this.  - S/p 10 mEq IV + 40 mEq PO in the ED  - Replace with goal >4 per surgery recs     HIV  Diagnosed in 2015, follows with Dr. Ivory.  - Continue pta biktarvy     Type 2 DM  Poorly controlled, last A1c was 10.6 in 10/2021. Managed with insulin, reports that his AM blood sugars are usually in the 200s.  - Restart PTA long acting insulin  - Fingersticks, hypoglycemia protocol  - Medium SSI    Periapical abscesses, right second mandibular molar, left first maxillary molar  #14 large occlusal caries with fracture on MB cusp with mobility on loose portion   #31 existing stainless steel crown  Patient complaining of broken crown.   -Seen by dental hygiene, assistance appreciated  -Recommend Chlorhexidine 0.12% mouthrinse. Rinse and spit out after every meal and before going to bed at night.  -Due to  severe mobility of coronal portion #14 due to fracture. Recommend to remove the mobile piece immediately to keep pt more comfortable during hospital stay. Pt will need to return to existing dentist for definitive treatment #14 and 31 after being discharged from the hospital.   -Possible treatment: extractions #14 and 31  -Clinic information:   [facility]  [address], [place] [address]  [phone]        Diet: Advance Diet as Tolerated: Regular Diet Adult    DVT Prophylaxis: Pneumatic Compression Devices  Potts Catheter: Not present  Fluids: Discontinue  Central Lines: None  Code Status: Full Code      Disposition Plan   Expected Discharge: 12/16/2021     Anticipated discharge location:  Awaiting care coordination huddle  Delays: none         The patient's care was discussed with the Attending Physician, Dr. Tania Coombs.    DO Lory Reed[address]  Securely message with the Vocera Web Console (learn more here)  Text page via [employer] Paging/Directory    Please see sign in/sign out for up to date coverage information      ______________________________________________________________________    Interval History   NAEO. All nursing notes reviewed. Patient had 2 bowel movements overnight. Abdominal pain better, but was having some pain with sips of water, states he is hungry and feels like he could eat. Denies nausea, vomiting.      Data reviewed today: I reviewed all medications, new labs and imaging results over the last 24 hours. I personally reviewed CT teeth: Periapical abscesses of the right second mandibular molar  and left first maxillary molar.       Physical Exam   Vital Signs:     BP: 111/78 Pulse: 92     SpO2: 99 %      Weight: 163 lbs 4.8 oz    Constitutional: lying in bed, non-toxic appearing, no acute distress  Eyes: anicteric, EOMI  ENT: oropharynx clear, MMM, NGT in place  Respiratory: breathing  non-labored on room air, CTAB, no crackles or wheezing  Cardiovascular: RRR, normal S1 and S2, no murmur noted  GI: non-distended, hypoactive bowel sounds present, diffuse tenderness but without rebound  Skin: no rashes or concerning lesions noted  Musculoskeletal: no lower extremity edema present  Neurologic: alert and oriented, moving all extremities equally  Neuropsychiatric: calm, normal eye contact, affect normal    Data   Recent Labs   Lab 12/14/21  2037 12/14/21  1714 12/14/21  1107 12/14/21  0328 12/13/21  2329 12/13/21  1016 12/13/21  0835 12/13/21  0256 12/12/21  2208 12/12/21  2153 12/12/21  2109   WBC  --   --  7.3  --   --  9.6  --   --   --   --  8.0   HGB  --   --  12.8*  --   --  12.9*  --   --   --  10.2* 11.2*   MCV  --   --  87  --   --  85  --   --   --   --  84   PLT  --   --  314  --   --  347  --   --   --   --  307   NA  --   --   --   --   --  137  138  --   --   --  145* 144   POTASSIUM  --   --   --   --   --  3.9  3.8  --  3.5 2.6* 2.4* 2.5*  2.5*   CHLORIDE  --   --   --   --   --  106  106  --   --   --   --  115*   CO2  --   --   --   --   --  24  26  --   --   --   --  23   BUN  --   --   --   --   --  10  12  --   --   --   --  10   CR  --   --   --   --   --  0.60*  0.65*  --   --   --   --  0.49*   ANIONGAP  --   --   --   --   --  7  6  --   --   --   --  6   LINDA  --   --   --   --   --  8.6  8.8  --   --   --   --  6.9*   * 253*  --  204*   < > 282*  288*   < >  --   --  191* 198*   ALBUMIN  --   --   --   --   --  3.0*  --   --   --   --  2.4*   PROTTOTAL  --   --   --   --   --  6.6*  --   --   --   --  5.6*   BILITOTAL  --   --   --   --   --  0.8  --   --   --   --  0.5   ALKPHOS  --   --   --   --   --  105  --   --   --   --  89   ALT  --   --   --   --   --  17  --   --   --   --  14   AST  --   --   --   --   --  12  --   --   --   --  12   LIPASE  --   --   --   --   --   --   --   --   --   --  83    < > = values in this interval not displayed.      Recent Results (from the past 24 hour(s))   CT Dental wo Contrast    Narrative    CT DENTAL WO CONTRAST 12/14/2021 11:32 AM    History:  possible sources of dental infection    Comparison:  CT dated 8/23/2019.      TECHNIQUE: 3-D reconstruction by the technologists, with curved  multiplanar reformat of thin section imaging through the mandible and  maxilla obtained without intravenous contrast.    FINDINGS:  Periapical lucency of the right second mandibular molar. Adjacent gas  lateral to this area is felt to represent gas within the gingivobuccal  sulcus. Severe dental caries with fractured crown and mild periapical  lucency of the left first maxillary molar with adjacent mucosal wall  thickening of the left maxillary sinus. Numerous dental caries  primarily within the maxillary teeth and largest in the left first  maxillary molar.     Normal facial bone alignment. Normal temporomandibular joints.      Impression    IMPRESSION: Periapical abscesses of the right second mandibular molar  and left first maxillary molar.     I have personally reviewed the examination and initial interpretation  and I agree with the findings.    WANG VIGIL MD         SYSTEM ID:  [id number]     Medications       bictegravir-emtricitabine-tenofovir  1 tablet Oral Daily     chlorhexidine  15 mL Swish & Spit 4x Daily     insulin aspart  1-7 Units Subcutaneous TID AC     insulin aspart  1-5 Units Subcutaneous At Bedtime     insulin detemir  12 Units Subcutaneous BID     lidocaine 1% with EPINEPHrine 1:100,000  5 mL Infiltration Once     [Held by provider] pantoprazole  40 mg Oral Daily     sodium chloride (PF)  3 mL Intracatheter Q8H

## 2021-12-14 NOTE — ED NOTES
"Pt went to the bathroom and reports a BM with \"A lot\" of volume of loose, watery stool. Night intern paged with this information. Pt reports he feels better at this time.   "

## 2021-12-14 NOTE — PROGRESS NOTES
General Surgery Progress Note  12/14/2021   ------------------------------------------------------------------------------------------------  Subjective/Interval History:  Patient reports a BM yesterday, which improved his symptoms. He does still note abdominal pain, which he states is the same as yesterday. He also reports dental pain, which has been causing him distress. No NG output recorded over the last 24 hours.  ------------------------------------------------------------------------------------------------  Objective:  Pulse:  [71-92] 92  BP: (111-138)/(75-91) 111/78  SpO2:  [95 %-100 %] 99 %    No intake/output data recorded.      Gen: Awake, interactive, NAD  Resp: breathing comfortably on room air  CV: regular rate, appears well perfused  Abd: soft, moderately distended, mildly tender to palpation  Ext: palpable pulses, no edema     Labs:  Recent Labs   Lab 12/13/21  1016 12/12/21 2153 12/12/21 2109   WBC 9.6  --  8.0   HGB 12.9* 10.2* 11.2*     --  307       Recent Labs   Lab 12/14/21  0328 12/13/21  2329 12/13/21  1016 12/13/21  0835 12/13/21  0256 12/12/21 2208 12/12/21 2153 12/12/21 2109   NA  --   --  137  138  --   --   --  145* 144   POTASSIUM  --   --  3.9  3.8  --  3.5 2.6* 2.4* 2.5*  2.5*   CHLORIDE  --   --  106  106  --   --   --   --  115*   CO2  --   --  24  26  --   --   --   --  23   BUN  --   --  10  12  --   --   --   --  10   CR  --   --  0.60*  0.65*  --   --   --   --  0.49*   * 261* 282*  288*   < >  --   --  191* 198*   LINDA  --   --  8.6  8.8  --   --   --   --  6.9*   MAG  --   --  2.0  --   --   --   --  1.6    < > = values in this interval not displayed.       Imaging:  Results for orders placed or performed during the hospital encounter of 12/12/21   CT Abdomen Pelvis w Contrast    Impression    IMPRESSION:   1.  Mid small bowel obstruction.  2.  Fluid-filled, distended stomach.   XR Abdomen Port 1 View    Impression    IMPRESSION: Enteric tube tip in  distal stomach. The stomach is distended. Contrast in renal collecting system. Dilated small bowel on CT not well visualized on this exam of the upper abdomen.        =======================================================  Assessment/Plan:   54 year old male with history of recurrent small bowel obstructions s/p appendectomy and multiple laparoscopic ANASTASIIA now with small bowel obstruction with a transition point in the right lower quadrant on CT.  Patient had BM and passing gas. No output recorded on NG tube    - Gastrografin canceled with return of BM  - Continue IVF  - Recommend 4 hour clamp trial; if return is less than 300, okay to remove NG and start clears     Seen, examined, and discussed with chief resident Dr. Patricio, who will discuss with staff.    Rianna Patel MD PGY1

## 2021-12-15 LAB
ANION GAP SERPL CALCULATED.3IONS-SCNC: 7 MMOL/L (ref 3–14)
BUN SERPL-MCNC: 12 MG/DL (ref 7–30)
CALCIUM SERPL-MCNC: 9.2 MG/DL (ref 8.5–10.1)
CHLORIDE BLD-SCNC: 104 MMOL/L (ref 94–109)
CO2 SERPL-SCNC: 23 MMOL/L (ref 20–32)
CREAT SERPL-MCNC: 0.72 MG/DL (ref 0.66–1.25)
ERYTHROCYTE [DISTWIDTH] IN BLOOD BY AUTOMATED COUNT: 13 % (ref 10–15)
GFR SERPL CREATININE-BSD FRML MDRD: >90 ML/MIN/1.73M2
GLUCOSE BLD-MCNC: 342 MG/DL (ref 70–99)
GLUCOSE BLDC GLUCOMTR-MCNC: 252 MG/DL (ref 70–99)
GLUCOSE BLDC GLUCOMTR-MCNC: 346 MG/DL (ref 70–99)
GLUCOSE BLDC GLUCOMTR-MCNC: 349 MG/DL (ref 70–99)
HCT VFR BLD AUTO: 36.5 % (ref 40–53)
HGB BLD-MCNC: 11.8 G/DL (ref 13.3–17.7)
MAGNESIUM SERPL-MCNC: 1.7 MG/DL (ref 1.6–2.3)
MCH RBC QN AUTO: 27.6 PG (ref 26.5–33)
MCHC RBC AUTO-ENTMCNC: 32.3 G/DL (ref 31.5–36.5)
MCV RBC AUTO: 85 FL (ref 78–100)
PHOSPHATE SERPL-MCNC: 3.2 MG/DL (ref 2.5–4.5)
PLATELET # BLD AUTO: 321 10E3/UL (ref 150–450)
POTASSIUM BLD-SCNC: 3.6 MMOL/L (ref 3.4–5.3)
RBC # BLD AUTO: 4.28 10E6/UL (ref 4.4–5.9)
SODIUM SERPL-SCNC: 134 MMOL/L (ref 133–144)
WBC # BLD AUTO: 6.3 10E3/UL (ref 4–11)

## 2021-12-15 PROCEDURE — 80048 BASIC METABOLIC PNL TOTAL CA: CPT | Performed by: STUDENT IN AN ORGANIZED HEALTH CARE EDUCATION/TRAINING PROGRAM

## 2021-12-15 PROCEDURE — 250N000011 HC RX IP 250 OP 636: Performed by: STUDENT IN AN ORGANIZED HEALTH CARE EDUCATION/TRAINING PROGRAM

## 2021-12-15 PROCEDURE — 83735 ASSAY OF MAGNESIUM: CPT | Performed by: STUDENT IN AN ORGANIZED HEALTH CARE EDUCATION/TRAINING PROGRAM

## 2021-12-15 PROCEDURE — 999N000127 HC STATISTIC PERIPHERAL IV START W US GUIDANCE

## 2021-12-15 PROCEDURE — 85027 COMPLETE CBC AUTOMATED: CPT | Performed by: STUDENT IN AN ORGANIZED HEALTH CARE EDUCATION/TRAINING PROGRAM

## 2021-12-15 PROCEDURE — 84100 ASSAY OF PHOSPHORUS: CPT | Performed by: STUDENT IN AN ORGANIZED HEALTH CARE EDUCATION/TRAINING PROGRAM

## 2021-12-15 PROCEDURE — 36415 COLL VENOUS BLD VENIPUNCTURE: CPT | Performed by: STUDENT IN AN ORGANIZED HEALTH CARE EDUCATION/TRAINING PROGRAM

## 2021-12-15 RX ADMIN — HYDROMORPHONE HYDROCHLORIDE 0.5 MG: 1 INJECTION, SOLUTION INTRAMUSCULAR; INTRAVENOUS; SUBCUTANEOUS at 02:00

## 2021-12-15 ASSESSMENT — ACTIVITIES OF DAILY LIVING (ADL)
ADLS_ACUITY_SCORE: 5

## 2021-12-15 NOTE — PROGRESS NOTES
Admitted/transferred from: ER  2 RN full skin assessment completed by Emily SANCHEZ  and Hermelinda GALVAN RN .  Skin assessment finding: skin intact, except dry skin on bottom of feet   Interventions/actions: Will continue to monitor.

## 2021-12-15 NOTE — PLAN OF CARE
"/74 (BP Location: Right arm)   Pulse 73   Temp 99.2  F (37.3  C) (Oral)   Resp 16   Ht 1.727 m (5' 8\")   Wt 72.2 kg (159 lb 3.2 oz)   SpO2 97%   BMI 24.21 kg/m       Shift: 1602-6878    Status: Adm 12/12 w/ abd pain and n/v. Hx SBO  Neuro: A&O x4  Respiratory: WDL on RA sating > 90. LS clear and diminished bilaterally   Cardiac: WDL on RA. Denies cardiac chest pain  GI/: Voids w/out difficulty. LBM 12/13/21 per pt   Diet: Regular. Pt was eating a curtousy meal @ 0030  Pain: Abd pain w/ PRN dilaudid q6h  Incision/Drains: none   IV Access: R PIV SL   Labs: , 346, 252, 349.  Activity: Up ad lexi    New changes this shift: Pt tried to leave AMA d/t not getting PRN dilaudid at the exact time it was available. RN explained dilaudid is PRN and is given when requested for. Pt threw his call light on the floor and said \"It doesn't matter. I'm in pain.\" Pt also upset d/t not being able to have insulin at any time of the night when BS elevated. Team saw pt at the bedside and pt agreed to stay if insulin could be given if needed when rechecked. BS rechecked, 252. Team paged and order was placed for 2 units of insulin about 45 minutes later. Due to the long wait time, pt wanted a a recheck. Recheck was 349. Pt upset stating insulin dose is not correct now and needs correct dose. RN offered to recheck for accuracy or call team for dosing based on 349 BS. Pt got upset and told RN \"just leave me alone.\" RN left the room. Pt later came to nurses station requesting to speak to team. Charge paged team per pt request.     Plan: Continue w/ poc   "

## 2021-12-15 NOTE — PROVIDER NOTIFICATION
Provider notified that Pt is getting his IV taken out and we printed AMA paperwork for him to leave

## 2021-12-15 NOTE — PROVIDER NOTIFICATION
Provider notified (name/pager): Adri Ortiz - [phone]  Time initial page sent: [phone]  Reason for notification: Pt wanted the NovoLOG earlier than bedtime (got it at 2038) and Levemir at bedtime. Now BG is 300. Would you want to do something abt it? Or just give the Levemir?   Recommendation/request given to provider:   Response from provider: Awaiting response.    Update: No new orders at this time.

## 2021-12-15 NOTE — PLAN OF CARE
"/76 (BP Location: Right arm)   Pulse 91   Temp 97.2  F (36.2  C) (Axillary)   Resp 16   Ht 1.727 m (5' 8\")   Wt 72.2 kg (159 lb 3.2 oz)   SpO2 96%   BMI 24.21 kg/m      Neuro: A&Ox4. Anxious at times  Cardiac: SR. VSS.   Respiratory: Sating 96% on RA.  GI/: Adequate urine output. No BM   Diet/appetite: Tolerating regular diet. Eating well.  Activity: Up independently. Went outside to smoke once  Pain: Denies  Skin: No new deficits noted.  LDA's: 2 PIV SL    Plan: Continue with POC. Notify primary team with changes.    "

## 2021-12-15 NOTE — PROGRESS NOTES
Patient wanted his NovoLOG earlier than it was scheduled and Levemir later than it was scheduled. Called unit pharmacist, was ok with changing the due times. Patient's BG at bedtime was 300. Pain was managed with prn Dilaudid 1x. Will continue with plan of care.

## 2021-12-15 NOTE — PROGRESS NOTES
Patient having antegrade bowel function and tolerating diet after NG tube removal yesterday. Continue diet as tolerated. Discharge per primary team. General surgery will sign off, please call with questions.    Mick Patricio MD  PGY-5, General Surgery

## 2021-12-18 NOTE — DISCHARGE SUMMARY
[facility]  Discharge Summary - Medicine & Pediatrics       Date of Admission:  12/12/2021  Date of Discharge:  12/14/2021  Discharging Provider: Left AMA  Discharge Service: Cristina Boggs    Discharge Diagnoses   Partial SBO  Hypokalemia  Periapical abscess    Follow-ups Needed After Discharge   Follow-up with primary care providers in 2-3 days    Discharge Disposition   Discharged to home  Condition at discharge: Stable      Hospital Course   Andrew Lockwood was admitted on 12/12/2021 for bowel obstruction.  He was treated with IVFs, IV pain medications, and NG tube.  He was improving and on the evening of 12/14/2021 he left against medical advice prior to he was medically ready.  I saw the patient in the morning of 12/14/2021 but did not see him when he left.     Consultations This Hospital Stay   VASCULAR ACCESS CARE ADULT IP CONSULT  DENTISTRY ADULT IP CONSULT  DENTAL HYGIENE IP ADULT CONSULT - UU  VASCULAR ACCESS CARE ADULT IP CONSULT    Code Status   Prior       Henry Coombs DO, MS   of Medicine  General Internal Medicine  [facility]  Text Page (8am - 5pm)   ______________________________________________________________________    Physical Exam   Vital Signs:                    Weight: 159 lbs 3.2 oz  I saw the patient in the morning of 12/14/2021 but did not see him when he left.     Primary Care Physician   Ayala Prieto    Discharge Orders   No discharge procedures on file.    Significant Results and Procedures       Discharge Medications   Discharge Medication List as of 12/15/2021  7:41 AM      CONTINUE these medications which have NOT CHANGED    Details   bictegravir-emtricitabine-tenofovir (BIKTARVY) -25 MG per tablet Take 1 tablet by mouth daily, Disp-30 tablet, R-6, E-Prescribe      glucose 40 % (400 mg/mL) gel 15 g every 15 minutes by mouth as needed for low blood sugar. Oral gel is preferable for  conscious and able to swallow patient.Disp-112.5 g, [id number]      insulin detemir (LEVEMIR PEN) 100 UNIT/ML pen Inject 18 Units Subcutaneous 2 times daily, Disp-100 mL, R-0, E-Prescribe      NOVOLOG FLEXPEN 100 UNIT/ML soln Inject 10 Units Subcutaneous 3 times daily (with meals), Disp-75 mL, R-0, OLESYA, E-Prescribe      pantoprazole (PROTONIX) 40 MG EC tablet Take 1 tablet (40 mg) by mouth daily, Disp-30 tablet, R-6, E-Prescribe      polyethylene glycol (MIRALAX) 17 GM/Dose powder Take 17 g by mouth daily as needed, Disp-510 g, R-0, E-Prescribe      traMADol (ULTRAM) 50 MG tablet Take 1 tablet (50 mg) by mouth every 6 hours as needed for moderate pain, Disp-10 tablet, R-0, E-Prescribe      Alcohol Swabs PADS Use to swab the area of the injection or todd as directed Per insurance coverage, Disp-100 each, R-0, E-Prescribe      blood glucose (NO BRAND SPECIFIED) test strip Use to test blood sugar 4 times daily or as directed. To accompany:whatever is covered, Disp-360 strip, R-3, E-Prescribe      blood glucose calibration (NO BRAND SPECIFIED) solution Used to calibrate the blood glucose monitor as needed and as directed.  To accompany  blood glucose brands per insurance coverage, Disp-1 each, R-0, E-Prescribe      blood glucose monitoring (NO BRAND SPECIFIED) meter device kit Use as directed. Per insurance coverage.Disp-1 kit, [id number]      insulin pen needle (BD SCOTT U/F) 32G X 4 MM miscellaneous USE 3 TO 4 NEEDLES DAILYDisp-400 each, [id number]      !! SENNA-docusate sodium (SENNA S) 8.6-50 MG tablet Take 1 tablet by mouth 2 times daily as needed (constipation), Disp-30 tablet, R-0, Local Print      !! SENNA-docusate sodium (SENNA S) 8.6-50 MG tablet Take 1 tablet by mouth 2 times daily Hold for loose stools., Disp-60 tablet, R-5, E-Prescribe      Sharps Container MISC Use as directed to dispose of needles, lancets and other sharps. Per Insurance coverage, Disp-1 each, R-0, E-Prescribe      STATIN NOT  "PRESCRIBED (INTENTIONAL) Reason Statin was Not Prescribed: OTHER - Enter reason is comments section (This option does NOTexclude patient from measure) / noted in chart that is recommended multiple timesNo Print Out      thin (NO BRAND SPECIFIED) lancets Use with lanceting device. To accompany: Test 4 times daily with whatever is covered, Disp-360 each, R-3, E-Prescribe       !! - Potential duplicate medications found. Please discuss with provider.        Allergies   Allergies   Allergen Reactions     Metformin      Abdominal pain     Tylenol [Acetaminophen] Itching     Dulaglutide Rash     Penicillin V Other (See Comments) and Rash     Diffuse maculopapular rash + feels \"high\", per pt.      "
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
## 2021-12-24 ENCOUNTER — APPOINTMENT (OUTPATIENT)
Dept: GENERAL RADIOLOGY | Facility: CLINIC | Age: 56
End: 2021-12-24
Attending: EMERGENCY MEDICINE
Payer: COMMERCIAL

## 2021-12-24 ENCOUNTER — HOSPITAL ENCOUNTER (INPATIENT)
Facility: CLINIC | Age: 56
LOS: 3 days | Discharge: HOME | End: 2021-12-27
Attending: EMERGENCY MEDICINE | Admitting: STUDENT IN AN ORGANIZED HEALTH CARE EDUCATION/TRAINING PROGRAM
Payer: COMMERCIAL

## 2021-12-24 ENCOUNTER — APPOINTMENT (OUTPATIENT)
Dept: CT IMAGING | Facility: CLINIC | Age: 56
End: 2021-12-24
Attending: EMERGENCY MEDICINE
Payer: COMMERCIAL

## 2021-12-24 DIAGNOSIS — R73.9 HYPERGLYCEMIA: ICD-10-CM

## 2021-12-24 DIAGNOSIS — K56.609 SMALL BOWEL OBSTRUCTION (H): Primary | ICD-10-CM

## 2021-12-24 DIAGNOSIS — E11.65 TYPE 2 DIABETES MELLITUS WITH HYPERGLYCEMIA, WITH LONG-TERM CURRENT USE OF INSULIN (H): ICD-10-CM

## 2021-12-24 DIAGNOSIS — E11.69 TYPE 2 DIABETES MELLITUS WITH OTHER SPECIFIED COMPLICATION, WITH LONG-TERM CURRENT USE OF INSULIN (H): ICD-10-CM

## 2021-12-24 DIAGNOSIS — Z79.4 TYPE 2 DIABETES MELLITUS WITH HYPERGLYCEMIA, WITH LONG-TERM CURRENT USE OF INSULIN (H): ICD-10-CM

## 2021-12-24 DIAGNOSIS — Z20.822 COVID-19 RULED OUT BY LABORATORY TESTING: ICD-10-CM

## 2021-12-24 DIAGNOSIS — Z79.4 TYPE 2 DIABETES MELLITUS WITH OTHER SPECIFIED COMPLICATION, WITH LONG-TERM CURRENT USE OF INSULIN (H): ICD-10-CM

## 2021-12-24 DIAGNOSIS — Z21 HUMAN IMMUNODEFICIENCY VIRUS I INFECTION (H): ICD-10-CM

## 2021-12-24 DIAGNOSIS — R10.9 ACUTE ABDOMINAL PAIN: ICD-10-CM

## 2021-12-24 LAB
ALBUMIN SERPL-MCNC: 3.1 G/DL (ref 3.4–5)
ALP SERPL-CCNC: 163 U/L (ref 40–150)
ALT SERPL W P-5'-P-CCNC: 20 U/L (ref 0–70)
ANION GAP SERPL CALCULATED.3IONS-SCNC: 9 MMOL/L (ref 3–14)
AST SERPL W P-5'-P-CCNC: 11 U/L (ref 0–45)
BASOPHILS # BLD AUTO: 0 10E3/UL (ref 0–0.2)
BASOPHILS NFR BLD AUTO: 0 %
BILIRUB SERPL-MCNC: 0.4 MG/DL (ref 0.2–1.3)
BUN SERPL-MCNC: 10 MG/DL (ref 7–30)
CALCIUM SERPL-MCNC: 8.7 MG/DL (ref 8.5–10.1)
CHLORIDE BLD-SCNC: 107 MMOL/L (ref 94–109)
CO2 SERPL-SCNC: 22 MMOL/L (ref 20–32)
CREAT SERPL-MCNC: 0.58 MG/DL (ref 0.66–1.25)
EOSINOPHIL # BLD AUTO: 0.1 10E3/UL (ref 0–0.7)
EOSINOPHIL NFR BLD AUTO: 2 %
ERYTHROCYTE [DISTWIDTH] IN BLOOD BY AUTOMATED COUNT: 13.3 % (ref 10–15)
GFR SERPL CREATININE-BSD FRML MDRD: >90 ML/MIN/1.73M2
GLUCOSE BLD-MCNC: 312 MG/DL (ref 70–99)
GLUCOSE BLDC GLUCOMTR-MCNC: 299 MG/DL (ref 70–99)
GLUCOSE BLDC GLUCOMTR-MCNC: 369 MG/DL (ref 70–99)
HCT VFR BLD AUTO: 40.5 % (ref 40–53)
HGB BLD-MCNC: 13.4 G/DL (ref 13.3–17.7)
IMM GRANULOCYTES # BLD: 0.1 10E3/UL
IMM GRANULOCYTES NFR BLD: 1 %
LACTATE SERPL-SCNC: 1.3 MMOL/L (ref 0.7–2)
LIPASE SERPL-CCNC: 162 U/L (ref 73–393)
LYMPHOCYTES # BLD AUTO: 2.1 10E3/UL (ref 0.8–5.3)
LYMPHOCYTES NFR BLD AUTO: 28 %
MCH RBC QN AUTO: 28.1 PG (ref 26.5–33)
MCHC RBC AUTO-ENTMCNC: 33.1 G/DL (ref 31.5–36.5)
MCV RBC AUTO: 85 FL (ref 78–100)
MONOCYTES # BLD AUTO: 0.4 10E3/UL (ref 0–1.3)
MONOCYTES NFR BLD AUTO: 5 %
NEUTROPHILS # BLD AUTO: 5 10E3/UL (ref 1.6–8.3)
NEUTROPHILS NFR BLD AUTO: 64 %
NRBC # BLD AUTO: 0 10E3/UL
NRBC BLD AUTO-RTO: 0 /100
PLATELET # BLD AUTO: 337 10E3/UL (ref 150–450)
POTASSIUM BLD-SCNC: 3.6 MMOL/L (ref 3.4–5.3)
PROT SERPL-MCNC: 7.1 G/DL (ref 6.8–8.8)
RBC # BLD AUTO: 4.77 10E6/UL (ref 4.4–5.9)
SODIUM SERPL-SCNC: 138 MMOL/L (ref 133–144)
WBC # BLD AUTO: 7.7 10E3/UL (ref 4–11)

## 2021-12-24 PROCEDURE — 80053 COMPREHEN METABOLIC PANEL: CPT | Performed by: EMERGENCY MEDICINE

## 2021-12-24 PROCEDURE — C9803 HOPD COVID-19 SPEC COLLECT: HCPCS | Performed by: EMERGENCY MEDICINE

## 2021-12-24 PROCEDURE — 250N000012 HC RX MED GY IP 250 OP 636 PS 637: Performed by: EMERGENCY MEDICINE

## 2021-12-24 PROCEDURE — 96361 HYDRATE IV INFUSION ADD-ON: CPT | Performed by: EMERGENCY MEDICINE

## 2021-12-24 PROCEDURE — 96374 THER/PROPH/DIAG INJ IV PUSH: CPT | Performed by: EMERGENCY MEDICINE

## 2021-12-24 PROCEDURE — 258N000003 HC RX IP 258 OP 636: Performed by: EMERGENCY MEDICINE

## 2021-12-24 PROCEDURE — 120N000001 HC R&B MED SURG/OB

## 2021-12-24 PROCEDURE — 83690 ASSAY OF LIPASE: CPT | Performed by: EMERGENCY MEDICINE

## 2021-12-24 PROCEDURE — 74018 RADEX ABDOMEN 1 VIEW: CPT

## 2021-12-24 PROCEDURE — 83036 HEMOGLOBIN GLYCOSYLATED A1C: CPT | Performed by: STUDENT IN AN ORGANIZED HEALTH CARE EDUCATION/TRAINING PROGRAM

## 2021-12-24 PROCEDURE — 99285 EMERGENCY DEPT VISIT HI MDM: CPT | Mod: 25 | Performed by: EMERGENCY MEDICINE

## 2021-12-24 PROCEDURE — 250N000011 HC RX IP 250 OP 636: Performed by: EMERGENCY MEDICINE

## 2021-12-24 PROCEDURE — 84100 ASSAY OF PHOSPHORUS: CPT | Performed by: STUDENT IN AN ORGANIZED HEALTH CARE EDUCATION/TRAINING PROGRAM

## 2021-12-24 PROCEDURE — 99285 EMERGENCY DEPT VISIT HI MDM: CPT | Performed by: EMERGENCY MEDICINE

## 2021-12-24 PROCEDURE — 36415 COLL VENOUS BLD VENIPUNCTURE: CPT | Performed by: EMERGENCY MEDICINE

## 2021-12-24 PROCEDURE — 85025 COMPLETE CBC W/AUTO DIFF WBC: CPT | Performed by: EMERGENCY MEDICINE

## 2021-12-24 PROCEDURE — 96372 THER/PROPH/DIAG INJ SC/IM: CPT | Performed by: EMERGENCY MEDICINE

## 2021-12-24 PROCEDURE — U0003 INFECTIOUS AGENT DETECTION BY NUCLEIC ACID (DNA OR RNA); SEVERE ACUTE RESPIRATORY SYNDROME CORONAVIRUS 2 (SARS-COV-2) (CORONAVIRUS DISEASE [COVID-19]), AMPLIFIED PROBE TECHNIQUE, MAKING USE OF HIGH THROUGHPUT TECHNOLOGIES AS DESCRIBED BY CMS-2020-01-R: HCPCS | Performed by: EMERGENCY MEDICINE

## 2021-12-24 PROCEDURE — 96376 TX/PRO/DX INJ SAME DRUG ADON: CPT | Performed by: EMERGENCY MEDICINE

## 2021-12-24 PROCEDURE — 74177 CT ABD & PELVIS W/CONTRAST: CPT | Mod: 26 | Performed by: RADIOLOGY

## 2021-12-24 PROCEDURE — 99233 SBSQ HOSP IP/OBS HIGH 50: CPT | Mod: 25 | Performed by: SURGERY

## 2021-12-24 PROCEDURE — 83605 ASSAY OF LACTIC ACID: CPT | Performed by: EMERGENCY MEDICINE

## 2021-12-24 PROCEDURE — 74018 RADEX ABDOMEN 1 VIEW: CPT | Mod: 26 | Performed by: RADIOLOGY

## 2021-12-24 PROCEDURE — 83735 ASSAY OF MAGNESIUM: CPT | Performed by: STUDENT IN AN ORGANIZED HEALTH CARE EDUCATION/TRAINING PROGRAM

## 2021-12-24 PROCEDURE — 74177 CT ABD & PELVIS W/CONTRAST: CPT

## 2021-12-24 RX ORDER — MORPHINE SULFATE 4 MG/ML
4 INJECTION, SOLUTION INTRAMUSCULAR; INTRAVENOUS
Status: DISCONTINUED | OUTPATIENT
Start: 2021-12-24 | End: 2021-12-25

## 2021-12-24 RX ORDER — ONDANSETRON 2 MG/ML
4 INJECTION INTRAMUSCULAR; INTRAVENOUS EVERY 30 MIN PRN
Status: DISCONTINUED | OUTPATIENT
Start: 2021-12-24 | End: 2021-12-27 | Stop reason: HOSPADM

## 2021-12-24 RX ORDER — IOPAMIDOL 755 MG/ML
97 INJECTION, SOLUTION INTRAVASCULAR ONCE
Status: COMPLETED | OUTPATIENT
Start: 2021-12-24 | End: 2021-12-24

## 2021-12-24 RX ORDER — SODIUM CHLORIDE 9 MG/ML
INJECTION, SOLUTION INTRAVENOUS CONTINUOUS
Status: DISCONTINUED | OUTPATIENT
Start: 2021-12-24 | End: 2021-12-25

## 2021-12-24 RX ORDER — SODIUM CHLORIDE 9 MG/ML
INJECTION, SOLUTION INTRAVENOUS CONTINUOUS
Status: DISCONTINUED | OUTPATIENT
Start: 2021-12-24 | End: 2021-12-26

## 2021-12-24 RX ADMIN — INSULIN ASPART 6 UNITS: 100 INJECTION, SOLUTION INTRAVENOUS; SUBCUTANEOUS at 22:18

## 2021-12-24 RX ADMIN — MORPHINE SULFATE 4 MG: 4 INJECTION INTRAVENOUS at 23:30

## 2021-12-24 RX ADMIN — IOPAMIDOL 97 ML: 755 INJECTION, SOLUTION INTRAVENOUS at 21:59

## 2021-12-24 RX ADMIN — MORPHINE SULFATE 4 MG: 4 INJECTION INTRAVENOUS at 20:25

## 2021-12-24 RX ADMIN — SODIUM CHLORIDE 1000 ML: 9 INJECTION, SOLUTION INTRAVENOUS at 19:40

## 2021-12-24 RX ADMIN — SODIUM CHLORIDE: 9 INJECTION, SOLUTION INTRAVENOUS at 20:24

## 2021-12-24 ASSESSMENT — ENCOUNTER SYMPTOMS
CONSTIPATION: 1
VOMITING: 0
SHORTNESS OF BREATH: 0
FEVER: 0
COUGH: 0
NAUSEA: 0
ABDOMINAL PAIN: 0

## 2021-12-24 ASSESSMENT — ACTIVITIES OF DAILY LIVING (ADL): ADLS_ACUITY_SCORE: 6

## 2021-12-24 ASSESSMENT — MIFFLIN-ST. JEOR: SCORE: 1462.22

## 2021-12-25 ENCOUNTER — APPOINTMENT (OUTPATIENT)
Dept: ULTRASOUND IMAGING | Facility: CLINIC | Age: 56
End: 2021-12-25
Attending: STUDENT IN AN ORGANIZED HEALTH CARE EDUCATION/TRAINING PROGRAM
Payer: COMMERCIAL

## 2021-12-25 LAB
ALBUMIN SERPL-MCNC: 2.5 G/DL (ref 3.4–5)
ALBUMIN UR-MCNC: NEGATIVE MG/DL
ALP SERPL-CCNC: 112 U/L (ref 40–150)
ALT SERPL W P-5'-P-CCNC: 16 U/L (ref 0–70)
ANION GAP SERPL CALCULATED.3IONS-SCNC: 5 MMOL/L (ref 3–14)
APPEARANCE UR: CLEAR
AST SERPL W P-5'-P-CCNC: 9 U/L (ref 0–45)
BILIRUB SERPL-MCNC: 0.4 MG/DL (ref 0.2–1.3)
BILIRUB UR QL STRIP: NEGATIVE
BUN SERPL-MCNC: 6 MG/DL (ref 7–30)
CALCIUM SERPL-MCNC: 7.8 MG/DL (ref 8.5–10.1)
CHLORIDE BLD-SCNC: 109 MMOL/L (ref 94–109)
CO2 SERPL-SCNC: 24 MMOL/L (ref 20–32)
COLOR UR AUTO: ABNORMAL
CREAT SERPL-MCNC: 0.58 MG/DL (ref 0.66–1.25)
ERYTHROCYTE [DISTWIDTH] IN BLOOD BY AUTOMATED COUNT: 13.4 % (ref 10–15)
GFR SERPL CREATININE-BSD FRML MDRD: >90 ML/MIN/1.73M2
GLUCOSE BLD-MCNC: 242 MG/DL (ref 70–99)
GLUCOSE BLDC GLUCOMTR-MCNC: 147 MG/DL (ref 70–99)
GLUCOSE BLDC GLUCOMTR-MCNC: 160 MG/DL (ref 70–99)
GLUCOSE BLDC GLUCOMTR-MCNC: 249 MG/DL (ref 70–99)
GLUCOSE BLDC GLUCOMTR-MCNC: 250 MG/DL (ref 70–99)
GLUCOSE BLDC GLUCOMTR-MCNC: 278 MG/DL (ref 70–99)
GLUCOSE UR STRIP-MCNC: >=1000 MG/DL
HBA1C MFR BLD: 11.9 % (ref 0–5.6)
HCT VFR BLD AUTO: 36.7 % (ref 40–53)
HGB BLD-MCNC: 12.1 G/DL (ref 13.3–17.7)
HGB UR QL STRIP: NEGATIVE
KETONES UR STRIP-MCNC: NEGATIVE MG/DL
LACTATE SERPL-SCNC: 0.8 MMOL/L (ref 0.7–2)
LEUKOCYTE ESTERASE UR QL STRIP: NEGATIVE
MAGNESIUM SERPL-MCNC: 1.8 MG/DL (ref 1.6–2.3)
MAGNESIUM SERPL-MCNC: 2.1 MG/DL (ref 1.6–2.3)
MCH RBC QN AUTO: 28.2 PG (ref 26.5–33)
MCHC RBC AUTO-ENTMCNC: 33 G/DL (ref 31.5–36.5)
MCV RBC AUTO: 86 FL (ref 78–100)
NITRATE UR QL: NEGATIVE
PH UR STRIP: 6 [PH] (ref 5–7)
PHOSPHATE SERPL-MCNC: 2.3 MG/DL (ref 2.5–4.5)
PHOSPHATE SERPL-MCNC: 2.3 MG/DL (ref 2.5–4.5)
PHOSPHATE SERPL-MCNC: 2.5 MG/DL (ref 2.5–4.5)
PLATELET # BLD AUTO: 367 10E3/UL (ref 150–450)
POTASSIUM BLD-SCNC: 3.2 MMOL/L (ref 3.4–5.3)
POTASSIUM BLD-SCNC: 3.2 MMOL/L (ref 3.4–5.3)
POTASSIUM BLD-SCNC: 3.9 MMOL/L (ref 3.4–5.3)
PROT SERPL-MCNC: 5.9 G/DL (ref 6.8–8.8)
RBC # BLD AUTO: 4.29 10E6/UL (ref 4.4–5.9)
RBC URINE: 0 /HPF
SARS-COV-2 RNA RESP QL NAA+PROBE: NEGATIVE
SODIUM SERPL-SCNC: 138 MMOL/L (ref 133–144)
SP GR UR STRIP: 1.01 (ref 1–1.03)
UROBILINOGEN UR STRIP-MCNC: NORMAL MG/DL
WBC # BLD AUTO: 7.8 10E3/UL (ref 4–11)
WBC URINE: 0 /HPF

## 2021-12-25 PROCEDURE — 84132 ASSAY OF SERUM POTASSIUM: CPT | Performed by: STUDENT IN AN ORGANIZED HEALTH CARE EDUCATION/TRAINING PROGRAM

## 2021-12-25 PROCEDURE — 83735 ASSAY OF MAGNESIUM: CPT | Performed by: STUDENT IN AN ORGANIZED HEALTH CARE EDUCATION/TRAINING PROGRAM

## 2021-12-25 PROCEDURE — 81001 URINALYSIS AUTO W/SCOPE: CPT | Performed by: STUDENT IN AN ORGANIZED HEALTH CARE EDUCATION/TRAINING PROGRAM

## 2021-12-25 PROCEDURE — 258N000003 HC RX IP 258 OP 636: Performed by: STUDENT IN AN ORGANIZED HEALTH CARE EDUCATION/TRAINING PROGRAM

## 2021-12-25 PROCEDURE — 250N000011 HC RX IP 250 OP 636: Performed by: STUDENT IN AN ORGANIZED HEALTH CARE EDUCATION/TRAINING PROGRAM

## 2021-12-25 PROCEDURE — 99223 1ST HOSP IP/OBS HIGH 75: CPT | Mod: AI | Performed by: STUDENT IN AN ORGANIZED HEALTH CARE EDUCATION/TRAINING PROGRAM

## 2021-12-25 PROCEDURE — 84100 ASSAY OF PHOSPHORUS: CPT | Performed by: STUDENT IN AN ORGANIZED HEALTH CARE EDUCATION/TRAINING PROGRAM

## 2021-12-25 PROCEDURE — 120N000002 HC R&B MED SURG/OB UMMC

## 2021-12-25 PROCEDURE — 36415 COLL VENOUS BLD VENIPUNCTURE: CPT | Performed by: STUDENT IN AN ORGANIZED HEALTH CARE EDUCATION/TRAINING PROGRAM

## 2021-12-25 PROCEDURE — 80053 COMPREHEN METABOLIC PANEL: CPT | Performed by: STUDENT IN AN ORGANIZED HEALTH CARE EDUCATION/TRAINING PROGRAM

## 2021-12-25 PROCEDURE — 83605 ASSAY OF LACTIC ACID: CPT | Performed by: STUDENT IN AN ORGANIZED HEALTH CARE EDUCATION/TRAINING PROGRAM

## 2021-12-25 PROCEDURE — 85027 COMPLETE CBC AUTOMATED: CPT | Performed by: STUDENT IN AN ORGANIZED HEALTH CARE EDUCATION/TRAINING PROGRAM

## 2021-12-25 PROCEDURE — 76857 US EXAM PELVIC LIMITED: CPT | Mod: 26 | Performed by: STUDENT IN AN ORGANIZED HEALTH CARE EDUCATION/TRAINING PROGRAM

## 2021-12-25 PROCEDURE — 250N000009 HC RX 250: Performed by: STUDENT IN AN ORGANIZED HEALTH CARE EDUCATION/TRAINING PROGRAM

## 2021-12-25 PROCEDURE — 76857 US EXAM PELVIC LIMITED: CPT

## 2021-12-25 PROCEDURE — 250N000013 HC RX MED GY IP 250 OP 250 PS 637: Performed by: STUDENT IN AN ORGANIZED HEALTH CARE EDUCATION/TRAINING PROGRAM

## 2021-12-25 RX ORDER — NALOXONE HYDROCHLORIDE 0.4 MG/ML
0.4 INJECTION, SOLUTION INTRAMUSCULAR; INTRAVENOUS; SUBCUTANEOUS
Status: DISCONTINUED | OUTPATIENT
Start: 2021-12-25 | End: 2021-12-27 | Stop reason: HOSPADM

## 2021-12-25 RX ORDER — ONDANSETRON 2 MG/ML
4 INJECTION INTRAMUSCULAR; INTRAVENOUS EVERY 6 HOURS PRN
Status: DISCONTINUED | OUTPATIENT
Start: 2021-12-25 | End: 2021-12-27 | Stop reason: HOSPADM

## 2021-12-25 RX ORDER — MORPHINE SULFATE 2 MG/ML
2 INJECTION, SOLUTION INTRAMUSCULAR; INTRAVENOUS EVERY 4 HOURS PRN
Status: DISCONTINUED | OUTPATIENT
Start: 2021-12-25 | End: 2021-12-25

## 2021-12-25 RX ORDER — MAGNESIUM SULFATE HEPTAHYDRATE 40 MG/ML
2 INJECTION, SOLUTION INTRAVENOUS ONCE
Status: COMPLETED | OUTPATIENT
Start: 2021-12-25 | End: 2021-12-25

## 2021-12-25 RX ORDER — NALOXONE HYDROCHLORIDE 0.4 MG/ML
0.2 INJECTION, SOLUTION INTRAMUSCULAR; INTRAVENOUS; SUBCUTANEOUS
Status: DISCONTINUED | OUTPATIENT
Start: 2021-12-25 | End: 2021-12-27 | Stop reason: HOSPADM

## 2021-12-25 RX ORDER — ONDANSETRON 4 MG/1
4 TABLET, ORALLY DISINTEGRATING ORAL EVERY 6 HOURS PRN
Status: DISCONTINUED | OUTPATIENT
Start: 2021-12-25 | End: 2021-12-27 | Stop reason: HOSPADM

## 2021-12-25 RX ORDER — POTASSIUM CHLORIDE 7.45 MG/ML
10 INJECTION INTRAVENOUS
Status: COMPLETED | OUTPATIENT
Start: 2021-12-25 | End: 2021-12-25

## 2021-12-25 RX ORDER — DEXTROSE MONOHYDRATE 25 G/50ML
25-50 INJECTION, SOLUTION INTRAVENOUS
Status: DISCONTINUED | OUTPATIENT
Start: 2021-12-25 | End: 2021-12-27 | Stop reason: HOSPADM

## 2021-12-25 RX ORDER — NICOTINE POLACRILEX 4 MG
15-30 LOZENGE BUCCAL
Status: DISCONTINUED | OUTPATIENT
Start: 2021-12-25 | End: 2021-12-27 | Stop reason: HOSPADM

## 2021-12-25 RX ORDER — LIDOCAINE 40 MG/G
CREAM TOPICAL
Status: DISCONTINUED | OUTPATIENT
Start: 2021-12-25 | End: 2021-12-27 | Stop reason: HOSPADM

## 2021-12-25 RX ORDER — MORPHINE SULFATE 2 MG/ML
2 INJECTION, SOLUTION INTRAMUSCULAR; INTRAVENOUS EVERY 6 HOURS PRN
Status: DISCONTINUED | OUTPATIENT
Start: 2021-12-25 | End: 2021-12-26

## 2021-12-25 RX ADMIN — SODIUM CHLORIDE: 9 INJECTION, SOLUTION INTRAVENOUS at 07:12

## 2021-12-25 RX ADMIN — POTASSIUM CHLORIDE 10 MEQ: 7.46 INJECTION, SOLUTION INTRAVENOUS at 11:15

## 2021-12-25 RX ADMIN — SODIUM CHLORIDE: 9 INJECTION, SOLUTION INTRAVENOUS at 20:04

## 2021-12-25 RX ADMIN — BICTEGRAVIR SODIUM, EMTRICITABINE, AND TENOFOVIR ALAFENAMIDE FUMARATE 1 TABLET: 50; 200; 25 TABLET ORAL at 08:14

## 2021-12-25 RX ADMIN — POTASSIUM PHOSPHATE, MONOBASIC AND POTASSIUM PHOSPHATE, DIBASIC 9 MMOL: 224; 236 INJECTION, SOLUTION, CONCENTRATE INTRAVENOUS at 17:13

## 2021-12-25 RX ADMIN — POTASSIUM CHLORIDE 10 MEQ: 7.46 INJECTION, SOLUTION INTRAVENOUS at 13:36

## 2021-12-25 RX ADMIN — MAGNESIUM SULFATE IN WATER 2 G: 40 INJECTION, SOLUTION INTRAVENOUS at 15:53

## 2021-12-25 RX ADMIN — POTASSIUM CHLORIDE 10 MEQ: 7.46 INJECTION, SOLUTION INTRAVENOUS at 12:35

## 2021-12-25 RX ADMIN — POTASSIUM CHLORIDE 10 MEQ: 7.46 INJECTION, SOLUTION INTRAVENOUS at 10:00

## 2021-12-25 RX ADMIN — MORPHINE SULFATE 2 MG: 2 INJECTION, SOLUTION INTRAMUSCULAR; INTRAVENOUS at 20:05

## 2021-12-25 ASSESSMENT — ACTIVITIES OF DAILY LIVING (ADL)
ADLS_ACUITY_SCORE: 6
ADLS_ACUITY_SCORE: 5
ADLS_ACUITY_SCORE: 6
ADLS_ACUITY_SCORE: 5
ADLS_ACUITY_SCORE: 5
ADLS_ACUITY_SCORE: 6
ADLS_ACUITY_SCORE: 5
ADLS_ACUITY_SCORE: 6
ADLS_ACUITY_SCORE: 5
ADLS_ACUITY_SCORE: 6
ADLS_ACUITY_SCORE: 6
ADLS_ACUITY_SCORE: 5
ADLS_ACUITY_SCORE: 6
ADLS_ACUITY_SCORE: 5
ADLS_ACUITY_SCORE: 5

## 2021-12-25 NOTE — ED NOTES
Bed: ED11  Expected date: 12/24/21  Expected time:   Means of arrival:   Comments:  [address]    55 y/o Male    Possible bowel obstruction  Elevated glucose 400's    Triaged:  YELLOW

## 2021-12-25 NOTE — PLAN OF CARE
"/71   Pulse 95   Temp 98.1  F (36.7  C) (Oral)   Resp 16   Ht 1.626 m (5' 4\")   Wt 72.1 kg (159 lb)   SpO2 98%   BMI 27.29 kg/m      Shift: [id number] arrived from ED around 0900    Status: ED admit for hyperglycemia, acute abdominal pain, SBO  Neuro: A&Ox4, able to make needs known, irritable at times  Respiratory: RA, LS clear, denies SOB & cough  Cardiac: WDL, denies cardiac chest pain  GI/: large loose brown BM this shift; void spontaneously via bathroom  Diet: NPO meds & ice chips ok  Pain/Nausea: denies pain this shift put states his abdomen is tender   Incisions/Drains: skin check refused; no drains or incisions, scars on abdomen  IV Access: R PIV infusing NS @ 125/mL; infusing last bag of potassium at shift change  Activity: UAL     Plan: NG recommended by Lissy's team, patient refusing; patient needs IV magnesium & IV phosphorus replaced. US bladder completed - see US impression for details.     "

## 2021-12-25 NOTE — H&P
[facility]    Family Medicine History and Physical - [facility]  Service       Date of Admission:  12/24/2021    Chief Complaint   Small bowel obstruction  Nausea without vomiting  Abdominal pain    History is obtained from the patient and electronic health record    History of Present Illness   Andrew Lockwood is a 54 year old male  who has a history of recurrent small bowel obstructions (last admitted 12 December 2021), AIDS on Biktarvy, GERD, type 2 diabetes on long-term insulin, status post lap appendectomy, status post diagnostic laparoscopy x2, unstable housing, and is admitted for small bowel obstruction.  Patient's last small bowel obstruction was 12 December 2021, managed nonoperatively with NG tube and pain control patient discharged to the street.  He reports developing up to 10 out of 10 pain in his left abdomen starting yesterday; yesterday was his last bowel movement he is not passed stool or gas for over 24 hours.  He denies fevers, chills, diarrhea, cough, shortness of breath, he endorses frequent urination but states that is due to his diabetes and unchanged from normal.  Denies suprapubic or flank pain.  He denies vomiting but notes increasing nausea although he states he has been able to take his medications including his insulin and his HIV medication.  He presented to the emergency department from his shelter because he recognizes this is the pain and symptoms that accompany has small bowel obstructions.    In the emergency department he has remained hemodynamically stable; significant results are lactic 1.3, Glucose 312, WBC 7.7, Hgb 7.7, K 3.6, creat 0.58, alk phos 163 LFTs otherwise WNL and lipase 162. CT shows 1.  Mid small bowel obstruction. Findings are similar in extent and distribution to 12/12/2021. Edema in the mesentery of the affected small bowel is more pronounced compared to the prior study. Developing ischemia is possible. Clinical  correlation and follow-up recommended. 2.  Distended urinary bladder and mild bilateral hydronephrosis may be on the basis of bladder outlet obstruction secondary to the enlarged prostate gland. Surgery team consulted from the ED and support attempt of non-operative management.  Patient is admitted to medicine team for nonoperative management.      Review of Systems   The 10 point Review of Systems is negative other than noted in the HPI or here.     Past Medical History    I have reviewed this patient's medical history and updated it with pertinent information if needed.   Past Medical History:   Diagnosis Date     AIDS (H)      Allergic rhinitis due to other allergen     DNS     Anal dysplasia      Chronic abdominal pain      CNS toxoplasmosis (H)      Diabetes type 2, controlled (H)      GERD (gastroesophageal reflux disease)      History of seizure      History of substance abuse (H)      HIV (human immunodeficiency virus infection) (H)      HLD (hyperlipidemia)      Lung nodules      Periungual wart      PTSD (post-traumatic stress disorder)      Sleep apnea     doesn't use CPAP        Past Surgical History   I have reviewed this patient's surgical history and updated it with pertinent information if needed.  Past Surgical History:   Procedure Laterality Date     ANOSCOPY N/A 9/9/2020    Procedure: Exam Under Anesthesia, ANOSCOPY, fulgeration of rectal fissures with Rectal Biopsies;  Surgeon: Thanh Lundberg MD;  [facility]     COLONOSCOPY Left 1/22/2016    Procedure: COMBINED COLONOSCOPY, SINGLE OR MULTIPLE BIOPSY/POLYPECTOMY BY BIOPSY;  Surgeon: Clark Saini MD;  Location: [facility] GI     HC EXPLORE UNDESC TESTIS,INGUIN/SCROTAL       LAPAROSCOPIC APPENDECTOMY N/A 1/31/2018    Procedure: LAPAROSCOPIC APPENDECTOMY;  LAPAROSCOPIC APPENDECTOMY;  Surgeon: Dawn Holt MD;  Location: [facility] OR     LAPAROSCOPY DIAGNOSTIC (GENERAL) N/A 7/26/2016    Procedure: LAPAROSCOPY DIAGNOSTIC (GENERAL);   Surgeon: Susannah Arriaga MD;  [facility]     LAPAROSCOPY DIAGNOSTIC (GENERAL) N/A 4/16/2018    Procedure: LAPAROSCOPY DIAGNOSTIC (GENERAL);  Diagnostic laparoscopy and lysis of adhesions;  Surgeon: Prince Dowling MD;  Location: [facility] OR     OPTICAL TRACKING SYSTEM CRANIOTOMY, EXCISE TUMOR, COMBINED Left 4/10/2015    Procedure: COMBINED OPTICAL TRACKING SYSTEM CRANIOTOMY, EXCISE TUMOR;  Surgeon: Mirlande Colmenares MD;  [facility] OR     REPAIR GAMEKEEPER'S THUMB Right 12/2/2016    Procedure: REPAIR LIGAMENT ULNAR COLLATERAL THUMB (GAMEKEEPER'S);  Surgeon: Evin Zamorano MD;  Location[facility] OR     ZZC NONSPECIFIC PROCEDURE      right forearm fracture        Social History   Social History     Tobacco Use     Smoking status: Current Every Day Smoker     Packs/day: 0.50     Years: 40.00     Pack years: 20.00     Types: Cigarettes     Smokeless tobacco: Former User   Substance Use Topics     Alcohol use: No     Alcohol/week: 0.0 standard drinks     Comment: Last etoh in 2007     Drug use: Not Currently     Types: Marijuana, Methamphetamines     Comment: Sober 9 months . pinsist never did drugs       Family History   I have reviewed this patient's family history and updated it with pertinent information if needed.   Family History   Problem Relation Age of Onset     Diabetes Brother      Diabetes Father      Alzheimer Disease Father      Unknown/Adopted Mother      Diabetes Paternal Grandfather      Cancer No family hx of         no skin cancer     Skin Cancer No family hx of         no famiy hx of skin cancer     Glaucoma No family hx of      Macular Degeneration No family hx of        Prior to Admission Medications   Prior to Admission Medications   Prescriptions Last Dose Informant Patient Reported? Taking?   Alcohol Swabs PADS  Self No No   Sig: Use to swab the area of the injection or todd as directed Per insurance coverage   NOVOLOG FLEXPEN 100 UNIT/ML soln  Self No No   Sig: Inject 10 Units  Subcutaneous 3 times daily (with meals)   SENNA-docusate sodium (SENNA S) 8.6-50 MG tablet Unknown at Unknown time Self No Yes   Sig: Take 1 tablet by mouth 2 times daily Hold for loose stools.   SENNA-docusate sodium (SENNA S) 8.6-50 MG tablet Unknown at Unknown time Self No Yes   Sig: Take 1 tablet by mouth 2 times daily as needed (constipation)   STATIN NOT PRESCRIBED (INTENTIONAL) Unknown at Unknown time Self No Yes   Sig: Please choose reason not prescribed, below   Sharps Container MISC Unknown at Unknown time Self No Yes   Sig: Use as directed to dispose of needles, lancets and other sharps. Per Insurance coverage   bictegravir-emtricitabine-tenofovir (BIKTARVY) -25 MG per tablet  Self No No   Sig: Take 1 tablet by mouth daily   blood glucose (NO BRAND SPECIFIED) test strip  Self No No   Sig: Use to test blood sugar 4 times daily or as directed. To accompany:whatever is covered   blood glucose calibration (NO BRAND SPECIFIED) solution  Self No No   Sig: Used to calibrate the blood glucose monitor as needed and as directed.  To accompany  blood glucose brands per insurance coverage   blood glucose monitoring (NO BRAND SPECIFIED) meter device kit  Self No No   Sig: Use as directed. Per insurance coverage.   glucose 40 % (400 mg/mL) gel  Self No No   Sig: 15 g every 15 minutes by mouth as needed for low blood sugar. Oral gel is preferable for conscious and able to swallow patient.   insulin detemir (LEVEMIR PEN) 100 UNIT/ML pen  Self No No   Sig: Inject 18 Units Subcutaneous 2 times daily   insulin pen needle (BD SCOTT U/F) 32G X 4 MM miscellaneous  Self No No   Sig: USE 3 TO 4 NEEDLES DAILY   pantoprazole (PROTONIX) 40 MG EC tablet Unknown at Unknown time Self No Yes   Sig: Take 1 tablet (40 mg) by mouth daily   polyethylene glycol (MIRALAX) 17 GM/Dose powder Unknown at Unknown time Self No Yes   Sig: Take 17 g by mouth daily as needed   thin (NO BRAND SPECIFIED) lancets Unknown at Unknown time Self No Yes  "  Sig: Use with lanceting device. To accompany: Test 4 times daily with whatever is covered   traMADol (ULTRAM) 50 MG tablet Unknown at Unknown time Self No Yes   Sig: Take 1 tablet (50 mg) by mouth every 6 hours as needed for moderate pain      Facility-Administered Medications: None     Allergies   Allergies   Allergen Reactions     Metformin      Abdominal pain     Tylenol [Acetaminophen] Itching     Dulaglutide Rash     Penicillin V Other (See Comments) and Rash     Diffuse maculopapular rash + feels \"high\", per pt.        Physical Exam   Vital Signs: Temp: 98.2  F (36.8  C) Temp src: Oral BP: 124/76 Pulse: 73   Resp: 18 SpO2: 99 % O2 Device: None (Room air)    Weight: 159 lbs 0 oz    General Appearance: Sleepy gentleman who appears stated age laying in bed in no acute distress  Eyes: Closed throughout exam  HEENT: Mucous membranes dark pink and tacky, NG in place  Respiratory: Exceedingly loud bowel sounds obscured breath sounds in the bases, no crackles or wheezing appreciated in upper fields.  Speaking in full sentences on room air.  Cardiovascular: Regular rate and rhythm, radial TP pulses symmetric 2+  GI: Abdomen is not distended, loud bowel sounds heard throughout loudest in right upper quadrant hyperactive, guarding with palpation but tolerates palpation.  No suprapubic tenderness.  Skin: Warm and dry  Musculoskeletal: Moves extremities to aid in exam and shift for exam  Neurologic: Sleepy but oriented, responds appropriately  Psychiatric: Affect appropriate to situation    Assessment & Plan   Andrew Lockwood is a 54 year old male admitted on 12/24/2021. He has a history of recurrent small bowel obstructions (last admitted 12 December 2021), AIDS on Biktarvy, GERD, type 2 diabetes on long-term insulin, status post lap appendectomy, status post diagnostic laparoscopy x2, unstable housing, and is admitted for small bowel obstruction.  and is admitted for management of small bowel obstruction    # Small bowel " obstruction   # Abdominal pain  # Nausea without vomiting   Patient with history of recurrent small bowel obstructions following adhesions that developed after on appendectomy.  He is hemodynamically stable and nontoxic-appearing; denied p.o. intake today however CT showed matter in the stomach.  Last seen in this hospital 12 December 2021 for small bowel obstruction that was managed nonoperatively with NG tube and pain management.  His CT today shows more edematous changes in the bowel wall but no free air; could represent developing ischemia but abdominal exam for now is reassuring and lactate 1.3, WBC 7.7.  Patient currently rates pain 10 out of 10 at worst, reports improvement after NG and morphine.  Patient also shows distended bladder and hydronephrosis which may be contributing to his abdominal discomfort although he denies any urinary symptoms other than chronically increased urine output due to his diabetes.   -General surgery consulted appreciate recs  -Serial abdominal exams  -NG in place, low intermittent suction  -Strict n.p.o. (excepting Biktarvy)  -Pain control with morphine 2 mg every 4 hours in the acute setting, decrease as soon as able to reduce ileus  -Nausea control with Zofran and Compazine as needed  - cc NS per hour  -Daily CBC, CMP, mag, phos  -Lactate if worsening clinical picture  -UA with pre and post void residual to determine need for Potts catheter    # Type II Diabetes on long term insulin  Poorly controlled, last hemoglobin A1c 10.6 on 21 October 2021.  Patient states that he has been taking his insulin as directed at home although he presents with sugars in the low 300s despite endorsing no appetite today.  Patient is currently strict n.p.o. with NG in place for management of small bowel obstruction.  - Hold PTA insulin detemir 18U BID until patient is tolerating p.o. then resume  - Hold PTA Novolog 10U TID with meals  - Medium dose sliding scale insulin    #  HIV/AIDS  #History of CNS toxoplasmosis  HIV RNA not detectable and CD4 count 519 on 24 August 2021.  Lowest CD4 count 26 upon diagnosis in 2015.  Patient denies any recent febrile illnesses, endorses being able to regularly take his medication and states that he has been taking it daily despite recent abdominal pain and nausea.  -Continue PTA Bictarvy     # GERD  - HOLD PTA pantoprazole 40mg tablet qday    #Homeless  Check with patient if he would desire social work consult prior to leaving the hospital    # Pain Assessment:  Current Pain Score 12/24/2021   Patient currently in pain? yes   Pain score (0-10) -   Pain location -   Pain descriptors -   - Andrew is experiencing pain due to small bowel obstruction. Pain management was discussed and the plan was created in a collaborative fashion.  Andrew's response to the current recommendations: engaged  - Please see the plan for pain management as documented above    Diet: No diet orders on file  Fluids: 125 ml/hr NS   DVT Prophylaxis: Pneumatic Compression Devices  Code Status: Full Code    Disposition Plan   Expected discharge: 2 - 3 days; recommended to prior living arrangement once adequate pain management/ tolerating PO medications.Dispo:          Entered: Sujata Bermudez 12/24/2021, 11:33 PM   Information in the above section will display in the discharge planner report.    The patient was discussed with Dr. Helga Bermudez  [facility] PGY-2  [facility]   Pager: [phone]  Please see sticky note for cross cover information      Data   Recent Labs   Lab 12/24/21 2020   WBC 7.7   HGB 13.4   MCV 85         POTASSIUM 3.6   CHLORIDE 107   CO2 22   BUN 10   CR 0.58*   ANIONGAP 9   LINDA 8.7   *   ALBUMIN 3.1*   PROTTOTAL 7.1   BILITOTAL 0.4   ALKPHOS 163*   ALT 20   AST 11   LIPASE 162     Recent Results (from the past 24 hour(s))   CT Abdomen Pelvis w Contrast    Narrative    EXAM: CT  ABDOMEN PELVIS W CONTRAST  LOCATION: [facility]  DATE/TIME: 12/24/2021 9:56 PM    INDICATION: Abdominal pain. Bowel obstruction suspected.  COMPARISON: 12/12/2021.  TECHNIQUE: CT scan of the abdomen and pelvis was performed following injection of IV contrast. Multiplanar reformats were obtained. Dose reduction techniques were used.  CONTRAST: Isovue 370    FINDINGS:   LOWER CHEST: Mild bibasilar atelectasis.    HEPATOBILIARY: Normal.    PANCREAS: Normal.    SPLEEN: Normal.    ADRENAL GLANDS: Normal.    KIDNEYS/BLADDER: Distended urinary bladder and mild bilateral hydronephrosis. Kidneys and bladder otherwise normal.    BOWEL: There are a few loops of mid small bowel which are moderately dilated and contain fluid in the lumen. The extent and distribution are similar to 12/12/2021. The stomach is also mildly distended with fluid and ingested material. Edema in the   mesentery of the affected small bowel is more pronounced compared to the prior study. No pneumatosis, perforation or abscess.    LYMPH NODES: Normal.    VASCULATURE: Mild aortoiliac atherosclerosis.    PELVIC ORGANS: Penile prosthesis in position. Mild enlargement of the prostate gland.    MUSCULOSKELETAL: Normal.      Impression    IMPRESSION:   1.  Mid small bowel obstruction. Findings are similar in extent and distribution to 12/12/2021. Edema in the mesentery of the affected small bowel is more pronounced compared to the prior study. Developing ischemia is possible. Clinical correlation and   follow-up recommended.  2.  Distended urinary bladder and mild bilateral hydronephrosis may be on the basis of bladder outlet obstruction secondary to the enlarged prostate gland.

## 2021-12-25 NOTE — CONSULTS
"[facility] Surgery Consultation    Andrew Lockwood MRN# [id number]   Age: 54 year old YOB: 1967     Date of Admission:  12/24/2021    Date of Consult:   12/24/21    Reason for consult: SBO       Requesting service: ED; requesting provider: Dr. Lehman                    Assessment and Plan:   Assessment:   Mr. Lockwood is a 54yr old male with history of recurrent SBOs who presented to the ED and was found to have CT scan findings consistent with SBO. He feels his symptoms are similar to SBOs he has had in the past. He is afebrile and hemodynamically stable, no elevation of lactic or leukocytosis. Abdominal exam significant for left sided tenderness, no rebound or guarding. Will trial conservative management of SBO.         Plan:   Admission to medicine  Minimize narcotics   NPO, IVF  NGT to LIS  Optimize electrolytes (K>4, Ph>3, Mg>2)   Ambulation   Surgery will follow. Please page on call resident if concerns regarding changes in abdominal exam or worsening pain.     Discussed with chief resident, Dr. Santana, who will discuss with staff.     Dawn Almanza MD  General Surgery, PGY3  [phone]            Chief Complaint:   \"I'm obstructed\"          History of Present Illness:   Mr. Lockwood is a 54 yr old male with PMHx of HIV, CNS toxoplasmosis, T2D, seizures, GERD, HLD, h/o lap appendectomy, diagnostic laparoscopy x 2 and recurrent SBO (last admitted 12/12/21) who presents with concern for bowel obstruction. He reports last had a BM yesterday, no gas or stool since. Has left sided abdominal pain which he states he has had for 3 yrs and is unchanged from prior. No fevers, chills, chest pain or SOB.     Of note, he was last admitted 12/12-15 for SBO, resolved with non operative management.     In the ED, he is afebrile and hemodynamically stable. CMP and CBC are unremarkable. Lipase and lactic wnl. Blood glucose >300. CT scan demonstrates distended stomach, loops of dilated small bowel (similar to " scan on 12/12) with some increased mesenteric edema. No pneumatosis or free air.          Past Medical History:     Past Medical History:   Diagnosis Date     AIDS (H)      Allergic rhinitis due to other allergen     DNS     Anal dysplasia      Chronic abdominal pain      CNS toxoplasmosis (H)      Diabetes type 2, controlled (H)      GERD (gastroesophageal reflux disease)      History of seizure      History of substance abuse (H)      HIV (human immunodeficiency virus infection) (H)      HLD (hyperlipidemia)      Lung nodules      Periungual wart      PTSD (post-traumatic stress disorder)      Sleep apnea     doesn't use CPAP             Past Surgical History:     Past Surgical History:   Procedure Laterality Date     ANOSCOPY N/A 9/9/2020    Procedure: Exam Under Anesthesia, ANOSCOPY, fulgeration of rectal fissures with Rectal Biopsies;  Surgeon: Thanh Lundberg MD;  [facility]     COLONOSCOPY Left 1/22/2016    Procedure: COMBINED COLONOSCOPY, SINGLE OR MULTIPLE BIOPSY/POLYPECTOMY BY BIOPSY;  Surgeon: Clark Saini MD;  [facility] GI     HC EXPLORE UNDESC TESTIS,INGUIN/SCROTAL       LAPAROSCOPIC APPENDECTOMY N/A 1/31/2018    Procedure: LAPAROSCOPIC APPENDECTOMY;  LAPAROSCOPIC APPENDECTOMY;  Surgeon: Dawn Holt MD;  [facility]     LAPAROSCOPY DIAGNOSTIC (GENERAL) N/A 7/26/2016    Procedure: LAPAROSCOPY DIAGNOSTIC (GENERAL);  Surgeon: Susannah Arriaga MD;  [facility]     LAPAROSCOPY DIAGNOSTIC (GENERAL) N/A 4/16/2018    Procedure: LAPAROSCOPY DIAGNOSTIC (GENERAL);  Diagnostic laparoscopy and lysis of adhesions;  Surgeon: Prince Dowling MD;  [facility] OR     OPTICAL TRACKING SYSTEM CRANIOTOMY, EXCISE TUMOR, COMBINED Left 4/10/2015    Procedure: COMBINED OPTICAL TRACKING SYSTEM CRANIOTOMY, EXCISE TUMOR;  Surgeon: Mirlande Colmenares MD;  [facility]     REPAIR GAMEKEEPER'S THUMB Right 12/2/2016    Procedure: REPAIR LIGAMENT ULNAR COLLATERAL THUMB  "(GAMEKEEPER'S);  Surgeon: Evin Zamorano MD;  Location:  OR     [facility] NONSPECIFIC PROCEDURE      right forearm fracture             Social History:     Social History     Tobacco Use     Smoking status: Current Every Day Smoker     Packs/day: 0.50     Years: 40.00     Pack years: 20.00     Types: Cigarettes     Smokeless tobacco: Former User   Substance Use Topics     Alcohol use: No     Alcohol/week: 0.0 standard drinks     Comment: Last etoh in 2007             Family History:     Family History   Problem Relation Age of Onset     Diabetes Brother      Diabetes Father      Alzheimer Disease Father      Unknown/Adopted Mother      Diabetes Paternal Grandfather      Cancer No family hx of         no skin cancer     Skin Cancer No family hx of         no famiy hx of skin cancer     Glaucoma No family hx of      Macular Degeneration No family hx of                 Allergies:     Allergies   Allergen Reactions     Metformin      Abdominal pain     Tylenol [Acetaminophen] Itching     Dulaglutide Rash     Penicillin V Other (See Comments) and Rash     Diffuse maculopapular rash + feels \"high\", per pt.              Medications:     Current Facility-Administered Medications   Medication     0.9% sodium chloride BOLUS    Followed by     sodium chloride 0.9% infusion     morphine (PF) injection 4 mg     morphine (PF) injection 4 mg     ondansetron (ZOFRAN) injection 4 mg     sodium chloride 0.9% infusion     Current Outpatient Medications   Medication Sig     pantoprazole (PROTONIX) 40 MG EC tablet Take 1 tablet (40 mg) by mouth daily     polyethylene glycol (MIRALAX) 17 GM/Dose powder Take 17 g by mouth daily as needed     SENNA-docusate sodium (SENNA S) 8.6-50 MG tablet Take 1 tablet by mouth 2 times daily as needed (constipation)     SENNA-docusate sodium (SENNA S) 8.6-50 MG tablet Take 1 tablet by mouth 2 times daily Hold for loose stools.     Sharps Container MISC Use as directed to dispose of needles, lancets " and other sharps. Per Insurance coverage     STATIN NOT PRESCRIBED (INTENTIONAL) Please choose reason not prescribed, below     thin (NO BRAND SPECIFIED) lancets Use with lanceting device. To accompany: Test 4 times daily with whatever is covered     traMADol (ULTRAM) 50 MG tablet Take 1 tablet (50 mg) by mouth every 6 hours as needed for moderate pain     Alcohol Swabs PADS Use to swab the area of the injection or todd as directed Per insurance coverage     bictegravir-emtricitabine-tenofovir (BIKTARVY) -25 MG per tablet Take 1 tablet by mouth daily     blood glucose (NO BRAND SPECIFIED) test strip Use to test blood sugar 4 times daily or as directed. To accompany:whatever is covered     blood glucose calibration (NO BRAND SPECIFIED) solution Used to calibrate the blood glucose monitor as needed and as directed.  To accompany  blood glucose brands per insurance coverage     blood glucose monitoring (NO BRAND SPECIFIED) meter device kit Use as directed. Per insurance coverage.     glucose 40 % (400 mg/mL) gel 15 g every 15 minutes by mouth as needed for low blood sugar. Oral gel is preferable for conscious and able to swallow patient.     insulin detemir (LEVEMIR PEN) 100 UNIT/ML pen Inject 18 Units Subcutaneous 2 times daily     insulin pen needle (BD SCOTT U/F) 32G X 4 MM miscellaneous USE 3 TO 4 NEEDLES DAILY     NOVOLOG FLEXPEN 100 UNIT/ML soln Inject 10 Units Subcutaneous 3 times daily (with meals)               Review of Systems:   Negative except as noted in HPI.           Physical Exam:   All vitals have been reviewed  Temp:  [98.2  F (36.8  C)] 98.2  F (36.8  C)  Pulse:  [69-85] 73  Resp:  [18] 18  BP: (121-164)/() 124/76  SpO2:  [98 %-100 %] 99 %  No intake or output data in the 24 hours ending 12/24/21 6792  Physical Exam:  Gen: awake, NAD  HEENT: NCAT, EOMI. NGT in place  CV: non cyanotic   Resp: unlabored breathing on RA  Abd: soft, non distended, tender to palpation on the left side. No  rebound or guarding.   Ext: moves spontaneously x 4           Data:   All laboratory data reviewed    Results:  BMP  Recent Labs   Lab 12/24/21 2020      POTASSIUM 3.6   CHLORIDE 107   CO2 22   BUN 10   CR 0.58*   *     CBC  Recent Labs   Lab 12/24/21 2020   WBC 7.7   HGB 13.4        LFT  Recent Labs   Lab 12/24/21 2020   AST 11   ALT 20   ALKPHOS 163*   BILITOTAL 0.4   ALBUMIN 3.1*     Recent Labs   Lab 12/24/21 2020   *       Imaging:  CT 12/24   IMPRESSION:   1.  Mid small bowel obstruction. Findings are similar in extent and distribution to 12/12/2021. Edema in the mesentery of the affected small bowel is more pronounced compared to the prior study. Developing ischemia is possible. Clinical correlation and   follow-up recommended.  2.  Distended urinary bladder and mild bilateral hydronephrosis may be on the basis of bladder outlet obstruction secondary to the enlarged prostate gland.

## 2021-12-25 NOTE — ED PROVIDER NOTES
[place] EMERGENCY DEPARTMENT ([facility])  December 24, 2021  History     Chief Complaint   Patient presents with     Diarrhea     Abdominal Pain     SBO     Hyperglycemia     HPI  Andrew Lockwood is a 54 year old male w/ PMH of HIV (dx in 2015 on biktarvy), recurrent SBO, starting in 2016, most recently 3/7/20, 11/11/20 and last 7/13/21, (s/p appendectomy, diagnostic laparoscopies), HIV, CNS Toxoplasmosis, chronic abdominal pain, T2DM, seizures, GERD, and HLD, s/p appendectomy and 2 laparoscopies with lysis of adhesions (last 04/2018).  who presents to the Emergency Department for constipation.  Andrew tells me that he has a small bowel obstruction.  He states that he has had left lower quadrant pain that is constant.  He states that this is the same presentation and symptoms as his previous SBO's which have been recurrent over the last few years.  He states that he was most recently admitted for this 2 weeks ago here at [facility].  He stayed in the hospital for 2 to 3 days before improving and being discharged.  He denies any vomiting.  No fever.  In terms of bowel mood changes he has been able to pass stools.  States that his pain is currently 10 out of 10 in intensity.    He states that he is diabetic and his blood sugar was 430 on arrival.  He states that he is taking his diabetic medication.  Reports that he is currently homeless and is living in a shelter. Had his appendix removed a few years ago.    Per EMR review, CT imaging from last ED visit from 12/12-12/15/21 at [facility] showed a fluid-filled, distended stomach with a fluid-filled, dilated loop of mid small bowel transitioning in the right lower quadrant and slight mesenteric congestion. NG tube placement in the ED was refused by patient. During primary medicine admission, patient was followed by general surgery and recommendation was to go NPO, with maintenance IVF with symptom control.    PAST MEDICAL HISTORY:   Past Medical History:   Diagnosis Date      AIDS (H)      Allergic rhinitis due to other allergen     DNS     Anal dysplasia      Chronic abdominal pain      CNS toxoplasmosis (H)      Diabetes type 2, controlled (H)      GERD (gastroesophageal reflux disease)      History of seizure      History of substance abuse (H)      HIV (human immunodeficiency virus infection) (H)      HLD (hyperlipidemia)      Lung nodules      Periungual wart      PTSD (post-traumatic stress disorder)      Sleep apnea     doesn't use CPAP       PAST SURGICAL HISTORY:   Past Surgical History:   Procedure Laterality Date     ANOSCOPY N/A 9/9/2020    Procedure: Exam Under Anesthesia, ANOSCOPY, fulgeration of rectal fissures with Rectal Biopsies;  Surgeon: Thanh Lundberg MD;  Location: [facility] OR     COLONOSCOPY Left 1/22/2016    Procedure: COMBINED COLONOSCOPY, SINGLE OR MULTIPLE BIOPSY/POLYPECTOMY BY BIOPSY;  Surgeon: Clark Saini MD;  Location: [facility] GI     HC EXPLORE UNDESC TESTIS,INGUIN/SCROTAL       LAPAROSCOPIC APPENDECTOMY N/A 1/31/2018    Procedure: LAPAROSCOPIC APPENDECTOMY;  LAPAROSCOPIC APPENDECTOMY;  Surgeon: Dawn Holt MD;  Location: [facility] OR     LAPAROSCOPY DIAGNOSTIC (GENERAL) N/A 7/26/2016    Procedure: LAPAROSCOPY DIAGNOSTIC (GENERAL);  Surgeon: Susannah Arriaga MD;  Location: [facility] OR     LAPAROSCOPY DIAGNOSTIC (GENERAL) N/A 4/16/2018    Procedure: LAPAROSCOPY DIAGNOSTIC (GENERAL);  Diagnostic laparoscopy and lysis of adhesions;  Surgeon: Prince Dowling MD;  Location: [facility] OR     OPTICAL TRACKING SYSTEM CRANIOTOMY, EXCISE TUMOR, COMBINED Left 4/10/2015    Procedure: COMBINED OPTICAL TRACKING SYSTEM CRANIOTOMY, EXCISE TUMOR;  Surgeon: Mirlande Colmenares MD;  Location: [facility] OR     REPAIR GAMEKEEPER'S THUMB Right 12/2/2016    Procedure: REPAIR LIGAMENT ULNAR COLLATERAL THUMB (GAMEKEEPER'S);  Surgeon: Evin Zamorano MD;  Location: [facility] OR     ZZC NONSPECIFIC PROCEDURE      right forearm fracture       Past medical history, past surgical  history, medications, and allergies were reviewed with the patient. Additional pertinent items: None    FAMILY HISTORY:   Family History   Problem Relation Age of Onset     Diabetes Brother      Diabetes Father      Alzheimer Disease Father      Unknown/Adopted Mother      Diabetes Paternal Grandfather      Cancer No family hx of         no skin cancer     Skin Cancer No family hx of         no famiy hx of skin cancer     Glaucoma No family hx of      Macular Degeneration No family hx of        SOCIAL HISTORY:   Social History     Tobacco Use     Smoking status: Current Every Day Smoker     Packs/day: 0.50     Years: 40.00     Pack years: 20.00     Types: Cigarettes     Smokeless tobacco: Former User   Substance Use Topics     Alcohol use: No     Alcohol/week: 0.0 standard drinks     Comment: Last etoh in 2007     Social history was reviewed with the patient. Additional pertinent items: None      Patient's Medications   New Prescriptions    No medications on file   Previous Medications    ALCOHOL SWABS PADS    Use to swab the area of the injection or todd as directed Per insurance coverage    BICTEGRAVIR-EMTRICITABINE-TENOFOVIR (BIKTARVY) -25 MG PER TABLET    Take 1 tablet by mouth daily    BLOOD GLUCOSE (NO BRAND SPECIFIED) TEST STRIP    Use to test blood sugar 4 times daily or as directed. To accompany:whatever is covered    BLOOD GLUCOSE CALIBRATION (NO BRAND SPECIFIED) SOLUTION    Used to calibrate the blood glucose monitor as needed and as directed.  To accompany  blood glucose brands per insurance coverage    BLOOD GLUCOSE MONITORING (NO BRAND SPECIFIED) METER DEVICE KIT    Use as directed. Per insurance coverage.    GLUCOSE 40 % (400 MG/ML) GEL    15 g every 15 minutes by mouth as needed for low blood sugar. Oral gel is preferable for conscious and able to swallow patient.    INSULIN DETEMIR (LEVEMIR PEN) 100 UNIT/ML PEN    Inject 18 Units Subcutaneous 2 times daily    INSULIN PEN NEEDLE (BD SCOTT U/F)  "32G X 4 MM MISCELLANEOUS    USE 3 TO 4 NEEDLES DAILY    NOVOLOG FLEXPEN 100 UNIT/ML SOLN    Inject 10 Units Subcutaneous 3 times daily (with meals)    PANTOPRAZOLE (PROTONIX) 40 MG EC TABLET    Take 1 tablet (40 mg) by mouth daily    POLYETHYLENE GLYCOL (MIRALAX) 17 GM/DOSE POWDER    Take 17 g by mouth daily as needed    SENNA-DOCUSATE SODIUM (SENNA S) 8.6-50 MG TABLET    Take 1 tablet by mouth 2 times daily Hold for loose stools.    SENNA-DOCUSATE SODIUM (SENNA S) 8.6-50 MG TABLET    Take 1 tablet by mouth 2 times daily as needed (constipation)    SHARPS CONTAINER MISC    Use as directed to dispose of needles, lancets and other sharps. Per Insurance coverage    STATIN NOT PRESCRIBED (INTENTIONAL)    Please choose reason not prescribed, below    THIN (NO BRAND SPECIFIED) LANCETS    Use with lanceting device. To accompany: Test 4 times daily with whatever is covered    TRAMADOL (ULTRAM) 50 MG TABLET    Take 1 tablet (50 mg) by mouth every 6 hours as needed for moderate pain   Modified Medications    No medications on file   Discontinued Medications    No medications on file          Allergies   Allergen Reactions     Metformin      Abdominal pain     Tylenol [Acetaminophen] Itching     Dulaglutide Rash     Penicillin V Other (See Comments) and Rash     Diffuse maculopapular rash + feels \"high\", per pt.         Review of Systems   Constitutional: Negative for fever.   Respiratory: Negative for cough and shortness of breath.    Cardiovascular: Negative for chest pain.   Gastrointestinal: Positive for constipation. Negative for abdominal pain, nausea and vomiting.   All other systems reviewed and are negative.    A complete review of systems was performed with pertinent positives and negatives noted in the HPI, and all other systems negative.    Physical Exam   BP: (!) 164/100  Pulse: 69  Temp: 98.2  F (36.8  C)  Resp: 18  Height: 162.6 cm (5' 4\")  Weight: 72.1 kg (159 lb)  SpO2: 100 %      Physical Exam  Vitals and " nursing note reviewed.   Constitutional:       General: He is not in acute distress.     Appearance: He is well-developed.   HENT:      Head: Normocephalic and atraumatic.      Mouth/Throat:      Mouth: Mucous membranes are moist.   Eyes:      General: No scleral icterus.     Conjunctiva/sclera: Conjunctivae normal.      Pupils: Pupils are equal, round, and reactive to light.   Cardiovascular:      Rate and Rhythm: Normal rate and regular rhythm.      Heart sounds: Normal heart sounds.   Pulmonary:      Effort: Pulmonary effort is normal. No respiratory distress.      Breath sounds: Normal breath sounds. No wheezing.   Abdominal:      General: Abdomen is flat.      Palpations: Abdomen is soft.      Tenderness: There is abdominal tenderness in the left upper quadrant and left lower quadrant.       Musculoskeletal:      Cervical back: Neck supple.   Skin:     General: Skin is warm and dry.   Neurological:      General: No focal deficit present.      Mental Status: He is alert and oriented to person, place, and time.      Cranial Nerves: No cranial nerve deficit.   Psychiatric:         Mood and Affect: Mood normal.         Behavior: Behavior normal.         ED Course   6:47 PM  The patient was seen and examined by Thanh Lehman MD in Room ED11.       Procedures         Results for orders placed or performed during the hospital encounter of 12/24/21 (from the past 24 hour(s))   Glucose by meter   Result Value Ref Range    GLUCOSE BY METER POCT 369 (H) 70 - 99 mg/dL   CBC with platelets differential    Narrative    The following orders were created for panel order CBC with platelets differential.  Procedure                               Abnormality         Status                     ---------                               -----------         ------                     CBC with platelets and d...[[id number]]                      Final result                 Please view results for these tests on the individual orders.    Comprehensive metabolic panel   Result Value Ref Range    Sodium 138 133 - 144 mmol/L    Potassium 3.6 3.4 - 5.3 mmol/L    Chloride 107 94 - 109 mmol/L    Carbon Dioxide (CO2) 22 20 - 32 mmol/L    Anion Gap 9 3 - 14 mmol/L    Urea Nitrogen 10 7 - 30 mg/dL    Creatinine 0.58 (L) 0.66 - 1.25 mg/dL    Calcium 8.7 8.5 - 10.1 mg/dL    Glucose 312 (H) 70 - 99 mg/dL    Alkaline Phosphatase 163 (H) 40 - 150 U/L    AST 11 0 - 45 U/L    ALT 20 0 - 70 U/L    Protein Total 7.1 6.8 - 8.8 g/dL    Albumin 3.1 (L) 3.4 - 5.0 g/dL    Bilirubin Total 0.4 0.2 - 1.3 mg/dL    GFR Estimate >90 >60 mL/min/1.73m2   Lipase   Result Value Ref Range    Lipase 162 73 - 393 U/L   CBC with platelets and differential   Result Value Ref Range    WBC Count 7.7 4.0 - 11.0 10e3/uL    RBC Count 4.77 4.40 - 5.90 10e6/uL    Hemoglobin 13.4 13.3 - 17.7 g/dL    Hematocrit 40.5 40.0 - 53.0 %    MCV 85 78 - 100 fL    MCH 28.1 26.5 - 33.0 pg    MCHC 33.1 31.5 - 36.5 g/dL    RDW 13.3 10.0 - 15.0 %    Platelet Count 337 150 - 450 10e3/uL    % Neutrophils 64 %    % Lymphocytes 28 %    % Monocytes 5 %    % Eosinophils 2 %    % Basophils 0 %    % Immature Granulocytes 1 %    NRBCs per 100 WBC 0 <1 /100    Absolute Neutrophils 5.0 1.6 - 8.3 10e3/uL    Absolute Lymphocytes 2.1 0.8 - 5.3 10e3/uL    Absolute Monocytes 0.4 0.0 - 1.3 10e3/uL    Absolute Eosinophils 0.1 0.0 - 0.7 10e3/uL    Absolute Basophils 0.0 0.0 - 0.2 10e3/uL    Absolute Immature Granulocytes 0.1 <=0.4 10e3/uL    Absolute NRBCs 0.0 10e3/uL   CT Abdomen Pelvis w Contrast    Narrative    EXAM: CT ABDOMEN PELVIS W CONTRAST  LOCATION: [facility]  DATE/TIME: 12/24/2021 9:56 PM    INDICATION: Abdominal pain. Bowel obstruction suspected.  COMPARISON: 12/12/2021.  TECHNIQUE: CT scan of the abdomen and pelvis was performed following injection of IV contrast. Multiplanar reformats were obtained. Dose reduction techniques were used.  CONTRAST: Isovue  370    FINDINGS:   LOWER CHEST: Mild bibasilar atelectasis.    HEPATOBILIARY: Normal.    PANCREAS: Normal.    SPLEEN: Normal.    ADRENAL GLANDS: Normal.    KIDNEYS/BLADDER: Distended urinary bladder and mild bilateral hydronephrosis. Kidneys and bladder otherwise normal.    BOWEL: There are a few loops of mid small bowel which are moderately dilated and contain fluid in the lumen. The extent and distribution are similar to 12/12/2021. The stomach is also mildly distended with fluid and ingested material. Edema in the   mesentery of the affected small bowel is more pronounced compared to the prior study. No pneumatosis, perforation or abscess.    LYMPH NODES: Normal.    VASCULATURE: Mild aortoiliac atherosclerosis.    PELVIC ORGANS: Penile prosthesis in position. Mild enlargement of the prostate gland.    MUSCULOSKELETAL: Normal.      Impression    IMPRESSION:   1.  Mid small bowel obstruction. Findings are similar in extent and distribution to 12/12/2021. Edema in the mesentery of the affected small bowel is more pronounced compared to the prior study. Developing ischemia is possible. Clinical correlation and   follow-up recommended.  2.  Distended urinary bladder and mild bilateral hydronephrosis may be on the basis of bladder outlet obstruction secondary to the enlarged prostate gland.   Lactic acid whole blood   Result Value Ref Range    Lactic Acid 1.3 0.7 - 2.0 mmol/L   Glucose by meter   Result Value Ref Range    GLUCOSE BY METER POCT 299 (H) 70 - 99 mg/dL     *Note: Due to a large number of results and/or encounters for the requested time period, some results have not been displayed. A complete set of results can be found in Results Review.     Medications   0.9% sodium chloride BOLUS (0 mLs Intravenous Stopped 12/24/21 2024)     Followed by   sodium chloride 0.9% infusion ( Intravenous Restarted 12/24/21 2328)   morphine (PF) injection 4 mg (4 mg Intravenous Given 12/24/21 2330)   0.9% sodium chloride BOLUS  (0 mLs Intravenous Stopped 12/24/21 2325)     Followed by   sodium chloride 0.9% infusion ( Intravenous Not Given 12/24/21 2329)   ondansetron (ZOFRAN) injection 4 mg (has no administration in time range)   morphine (PF) injection 4 mg (has no administration in time range)   insulin aspart (NovoLOG) injection (RAPID ACTING) (6 Units Subcutaneous Given 12/24/21 2218)   iopamidol (ISOVUE-370) solution 97 mL (97 mLs Intravenous Given 12/24/21 2159)   sodium chloride (PF) 0.9% PF flush 74 mL (74 mLs Intravenous Given 12/24/21 2200)            CT Abdomen Pelvis w Contrast   Final Result   IMPRESSION:    1.  Mid small bowel obstruction. Findings are similar in extent and distribution to 12/12/2021. Edema in the mesentery of the affected small bowel is more pronounced compared to the prior study. Developing ischemia is possible. Clinical correlation and    follow-up recommended.   2.  Distended urinary bladder and mild bilateral hydronephrosis may be on the basis of bladder outlet obstruction secondary to the enlarged prostate gland.      XR Abdomen Port 1 View    (Results Pending)         Assessments & Plan (with Medical Decision Making)     This is a 54-year-old male with complex past history who presents with acute abdominal pain that he says is similar to previous SBO.  CT scan was performed that does show bowel obstruction.  He also was hyperglycemic which was treated with fluids and insulin.  He had large volume of material in his stomach and NG decompression was ordered.  I believe he can be admitted to the medicine service I requested a surgical consult which is typically requested by medicine prior to admission.  Lactic acid was negative.  I believe is appropriate for conservative management of SBO on the medicine service.  I have reviewed the nursing notes.    I have reviewed the findings, diagnosis, plan and need for follow up with the patient.    New Prescriptions    No medications on file       Final  diagnoses:   Acute abdominal pain   Hyperglycemia   Small bowel obstruction (H)     IJoseph, am serving as a trained medical scribe to document services personally performed by Thanh Lehman MD, based on the provider's statements to me.   Thanh KELLY MD, was physically present and have reviewed and verified the accuracy of this note documented by Joseph Wan.     12/24/2021   [facility] EMERGENCY DEPARTMENT     Thanh Lehman MD  12/24/21 [id number]

## 2021-12-25 NOTE — PROGRESS NOTES
"General surgery progress note  12/25/21  1200PM     S: MARCEL. Declined NG placement. Passing flatus. Improvement in abdominal pain     /71   Pulse 95   Temp 98.1  F (36.7  C) (Oral)   Resp 16   Ht 1.626 m (5' 4\")   Wt 72.1 kg (159 lb)   SpO2 98%   BMI 27.29 kg/m       Sleeping comfortably in bed, NAD   RRR on pulse   NLB on RA   Abdomen soft, mildly distended, non tender to palpation     Labs reviewed     CT and Xray reviewed     A/P: Mr. Lockwood is a 54yr old male with history of recurrent SBOs who presented to the ED and was found to have CT scan findings consistent with SBO. Given similar previous presentations that have resolved non operatively we will trial conservative management of the SBO. We did have a discussion with him that if these do keep recurring he may need an operation in the future.     -- okay for CLD given improvement in symptoms, if nauseated/vomiting make NPO   -- Hold off for NG for now, if persistently vomiting recommend NG   -- Minimize narcotics   -- Optimize electrolytes   -- Encourage ambulation  -- EGS will continue to follow, if concerns about acute changes in exam please page on call surgery resident     Seen and discussed with chief resident Dr. Santana who will discuss with staff     Ruben Martinez MD   Surgery PGY-2   "

## 2021-12-25 NOTE — PROGRESS NOTES
[facility] Progress Note    Main Plans for Today     - work with pt to try and replace NG  - K, Mg, Phos replacement  - continue IVF & NPO  - follow-up pre and post-void residual    Assessment & Plan   Andrew Lockwood is a 54 year old male admitted on 12/24/2021. He has a history of recurrent small bowel obstructions (last admitted 12 December 2021), AIDS on Biktarvy, GERD, type 2 diabetes on long-term insulin, status post lap appendectomy, status post diagnostic laparoscopy x2, unstable housing, and is admitted for small bowel obstruction. He has declined replacement of NG tube and is currently on bowel rest alone for management.     # Small bowel obstruction   # Abdominal pain - improved  # Nausea without vomiting - improved  Patient with history of recurrent small bowel obstructions following adhesions that developed after on appendectomy.  Last seen in this hospital 12 December 2021 for small bowel obstruction that was managed nonoperatively with NG tube and pain management.  CT abdomen on admission with gastric contents in the stomach and worsening edematous changes in the bowel wall but no free air. Lactates reassuring since admission, 1.3->0.8, no current findings suggestive of ischemia. CT also reveals distended bladder and hydronephrosis which may be contributing to his abdominal discomfort although he denies any urinary symptoms other than chronically increased urine output due to his diabetes.   -General surgery consulted, appreciate recs  -Serial abdominal exams  -REPLACE NG if pt agreeable, low intermittent suction  -Strict n.p.o. (excepting Biktarvy)  -Pain control with morphine 2 mg every 4 hours in the acute setting, decrease as soon as able to reduce ileus  -Nausea control with Zofran and Compazine as needed  - cc NS per hour  -Daily CBC, CMP, mag, phos  -pre and post void residual to determine need for Potts catheter  -  K, Mg, Phos replacement     # Type II Diabetes on long term insulin  Poorly controlled, A1c on arrival 11.9 up from 10.6 on 21 October 2021.  Patient states that he has been taking his insulin as directed at home; unclear if adherence or inadequate coverage leading to elevation in A1c. Pt remains NPO  - Hold PTA insulin detemir 18U BID until patient is tolerating p.o. then resume  - Hold PTA Novolog 10U TID with meals  - Medium dose sliding scale insulin     # HIV/AIDS  #History of CNS toxoplasmosis  HIV RNA not detectable and CD4 count 519 on 24 August 2021.  Lowest CD4 count 26 upon diagnosis in 2015.  Patient denies any recent febrile illnesses, endorses being able to regularly take his medication and states that he has been taking it daily despite recent abdominal pain and nausea.  -Continue PTA Bictarvy      # GERD  - HOLD PTA pantoprazole 40mg tablet qday     # Housing instability  - review SW consult with patient    # Pain Assessment:  Current Pain Score 12/25/2021   Patient currently in pain? denies   Pain score (0-10) -   Pain location -   Pain descriptors -   - Andrew is experiencing pain due to small bowel obstruction. Pain management was discussed and the plan was created in a collaborative fashion.  Andrew's response to the current recommendations: engaged  - Please see the plan for pain management as documented above      Diet: NPO for Medical/Clinical Reasons Except for: Meds, Ice Chips  Fluids: 125ml/hr NS  DVT Prophylaxis: Pneumatic Compression Devices  Code Status: Full Code    Disposition Plan   Expected discharge:  2 - 3 days, recommended to prior living arrangement once adequate pain management/ tolerating PO medications.       Entered: Radha Estrada 12/25/2021, 7:25 AM   Information in the above section will display in the discharge planner report.      The patient's care was discussed with staff attending, Dr. Marcus Jurado.     Radha Estrada,   [facility]  Medicine: PGY2  Pager: [phone]  Please see sticky note for cross cover information    Interval History    Removal of initial NG with attempted placement of larger bore NG which patient has declined. Currently NPO. Pt notes he feels better this AM. No nausea/vomiting. Passed a large bowel movement. Feels like he is all better. Pain controlled with minimal intake of morphine (last given 12/24 at 2330.) He again declines placement of NG. Voided 525ml since arrival. No cough, no CP, no SOB.    Review of Systems  4 point ROS negative w/ exception of above.     Physical Exam   Vital Signs: Temp: 98.1  F (36.7  C) Temp src: Oral BP: 120/71 Pulse: 95   Resp: 16 SpO2: 98 % O2 Device: None (Room air)    Weight: 159 lbs 0 oz  Physical Exam  General: No acute distress, laying comfortably in bed.    Eyes: EOM in tact, sclera anicteric  HEENT: Mucous membranes tachy  Respiratory:  Speaking in full sentences on room air.  Cardiovascular: Regular rate peripheral extremities appear well perfused  GI: Abdomen is not distended, soft, nontender, no HSM palpated. No suprapubic tenderness.  Skin: Warm and dry  Musculoskeletal: Moves extremities spontaneously and equally  Neurologic: oriented, responds appropriately  Psychiatric: Affect appropriate to situation      Data   Recent Labs   Lab 12/25/21  0431 12/24/21 2331 12/24/21 2020   WBC  --   --  7.7   HGB  --   --  13.4   MCV  --   --  85   PLT  --   --  337   NA  --   --  138   POTASSIUM  --   --  3.6   CHLORIDE  --   --  107   CO2  --   --  22   BUN  --   --  10   CR  --   --  0.58*   ANIONGAP  --   --  9   LINDA  --   --  8.7   * 299* 312*   ALBUMIN  --   --  3.1*   PROTTOTAL  --   --  7.1   BILITOTAL  --   --  0.4   ALKPHOS  --   --  163*   ALT  --   --  20   AST  --   --  11   LIPASE  --   --  162     Recent Results (from the past 24 hour(s))   CT Abdomen Pelvis w Contrast    Narrative    EXAM: CT ABDOMEN PELVIS W CONTRAST  LOCATION: [facility]  DATE/TIME: 12/24/2021 9:56 PM    INDICATION: Abdominal pain. Bowel obstruction suspected.  COMPARISON: 12/12/2021.  TECHNIQUE: CT scan of the abdomen and pelvis was performed following injection of IV contrast. Multiplanar reformats were obtained. Dose reduction techniques were used.  CONTRAST: Isovue 370    FINDINGS:   LOWER CHEST: Mild bibasilar atelectasis.    HEPATOBILIARY: Normal.    PANCREAS: Normal.    SPLEEN: Normal.    ADRENAL GLANDS: Normal.    KIDNEYS/BLADDER: Distended urinary bladder and mild bilateral hydronephrosis. Kidneys and bladder otherwise normal.    BOWEL: There are a few loops of mid small bowel which are moderately dilated and contain fluid in the lumen. The extent and distribution are similar to 12/12/2021. The stomach is also mildly distended with fluid and ingested material. Edema in the   mesentery of the affected small bowel is more pronounced compared to the prior study. No pneumatosis, perforation or abscess.    LYMPH NODES: Normal.    VASCULATURE: Mild aortoiliac atherosclerosis.    PELVIC ORGANS: Penile prosthesis in position. Mild enlargement of the prostate gland.    MUSCULOSKELETAL: Normal.      Impression    IMPRESSION:   1.  Mid small bowel obstruction. Findings are similar in extent and distribution to 12/12/2021. Edema in the mesentery of the affected small bowel is more pronounced compared to the prior study. Developing ischemia is possible. Clinical correlation and   follow-up recommended.  2.  Distended urinary bladder and mild bilateral hydronephrosis may be on the basis of bladder outlet obstruction secondary to the enlarged prostate gland.   XR Abdomen Port 1 View    Narrative    EXAM: XR ABDOMEN PORT 1 VIEWS  LOCATION: [facility]  DATE/TIME: 12/24/2021 11:28 PM    INDICATION: NG tube placement  COMPARISON: CT abdomen pelvis 12/24/2021      Impression    IMPRESSION: NG tube kinked within the proximal stomach  with the tip oriented in a retrograde fashion towards the GE junction. This should be repositioned. Marked gastric distention with air. Numerous gas distended loops of small bowel and colon.      Medications     sodium chloride 125 mL/hr at 12/25/21 0712     sodium chloride         sodium chloride 0.9%  1,000 mL Intravenous Once     bictegravir-emtricitabine-tenofovir  1 tablet Oral Daily     insulin aspart  1-6 Units Subcutaneous Q4H     sodium chloride (PF)  3 mL Intracatheter Q8H

## 2021-12-25 NOTE — ED NOTES
"Pt continuing to refuse NGT, stated \"I've been passing gas.\" Currently denying pain. Faint bowel sounds auscultated in all quadrants. Mild pain on palpation in LUQ and LLQ abdomen. Plan to monitor.  "

## 2021-12-25 NOTE — ED NOTES
"[facility]   ED Nurse to Floor Handoff     Andrew Lockwood is a 54 year old male who speaks English and lives with others, from homeless shelter.  They arrived in the ED by ambulance from shelter.    ED Chief Complaint: Diarrhea, Abdominal Pain (SBO), and Hyperglycemia    ED Dx;   Final diagnoses:   Acute abdominal pain   Hyperglycemia   Small bowel obstruction (H)         Needed?: No    Allergies:   Allergies   Allergen Reactions     Metformin      Abdominal pain     Tylenol [Acetaminophen] Itching     Dulaglutide Rash     Penicillin V Other (See Comments) and Rash     Diffuse maculopapular rash + feels \"high\", per pt.    .  Past Medical Hx:   Past Medical History:   Diagnosis Date     AIDS (H)      Allergic rhinitis due to other allergen     DNS     Anal dysplasia      Chronic abdominal pain      CNS toxoplasmosis (H)      Diabetes type 2, controlled (H)      GERD (gastroesophageal reflux disease)      History of seizure      History of substance abuse (H)      HIV (human immunodeficiency virus infection) (H)      HLD (hyperlipidemia)      Lung nodules      Periungual wart      PTSD (post-traumatic stress disorder)      Sleep apnea     doesn't use CPAP      Baseline Mental status: WDL  Current Mental Status changes: at basesline    Infection present or suspected this encounter: no  Sepsis suspected: No  Isolation type: Contact  Patient tested for COVID 19 prior to admission: YES - negative    Activity level - Baseline/Home:  Independent  Activity Level - Current:   Independent    Bariatric equipment needed?: No    In the ED these meds were given:   Medications   0.9% sodium chloride BOLUS (0 mLs Intravenous Stopped 12/24/21 2024)     Followed by   sodium chloride 0.9% infusion ( Intravenous New Bag 12/25/21 0712)   morphine (PF) injection 4 mg (4 mg Intravenous Given 12/24/21 2330)   0.9% sodium chloride BOLUS (0 mLs Intravenous Stopped 12/24/21 2325)     " Followed by   sodium chloride 0.9% infusion ( Intravenous Not Given 12/24/21 2329)   ondansetron (ZOFRAN) injection 4 mg (has no administration in time range)   morphine (PF) injection 4 mg (has no administration in time range)   bictegravir-emtricitabine-tenofovir (BIKTARVY) -25 MG per tablet 1 tablet (has no administration in time range)   lidocaine 1 % 0.1-1 mL (has no administration in time range)   lidocaine (LMX4) cream (has no administration in time range)   sodium chloride (PF) 0.9% PF flush 3 mL (0 mLs Intracatheter Hold 12/25/21 0421)   sodium chloride (PF) 0.9% PF flush 3 mL (has no administration in time range)   glucose gel 15-30 g (has no administration in time range)     Or   dextrose 50 % injection 25-50 mL (has no administration in time range)     Or   glucagon injection 1 mg (has no administration in time range)   morphine (PF) injection 2 mg (has no administration in time range)   ondansetron (ZOFRAN-ODT) ODT tab 4 mg (has no administration in time range)     Or   ondansetron (ZOFRAN) injection 4 mg (has no administration in time range)   insulin aspart (NovoLOG) injection (RAPID ACTING) (3 Units Subcutaneous Given 12/25/21 0432)   insulin aspart (NovoLOG) injection (RAPID ACTING) (6 Units Subcutaneous Given 12/24/21 2218)   iopamidol (ISOVUE-370) solution 97 mL (97 mLs Intravenous Given 12/24/21 2159)   sodium chloride (PF) 0.9% PF flush 74 mL (74 mLs Intravenous Given 12/24/21 2200)       Drips running?  Yes - mIVF (NS @125mL/hr)    Home pump  No    Current LDAs  Peripheral IV 12/24/21 Anterior;Left Upper forearm (Active)   Site Assessment WDL 12/25/21 0710   Line Status Infusing 12/25/21 0710   Phlebitis Scale 0-->no symptoms 12/25/21 0710   Infiltration Scale 0 12/25/21 0710   Number of days: 1       Labs results:   Labs Ordered and Resulted from Time of ED Arrival to Time of ED Departure   COMPREHENSIVE METABOLIC PANEL - Abnormal       Result Value    Sodium 138      Potassium 3.6       Chloride 107      Carbon Dioxide (CO2) 22      Anion Gap 9      Urea Nitrogen 10      Creatinine 0.58 (*)     Calcium 8.7      Glucose 312 (*)     Alkaline Phosphatase 163 (*)     AST 11      ALT 20      Protein Total 7.1      Albumin 3.1 (*)     Bilirubin Total 0.4      GFR Estimate >90     GLUCOSE BY METER - Abnormal    GLUCOSE BY METER POCT 369 (*)    GLUCOSE BY METER - Abnormal    GLUCOSE BY METER POCT 299 (*)    HEMOGLOBIN A1C - Abnormal    Hemoglobin A1C 11.9 (*)    UA MACROSCOPIC WITH REFLEX TO MICRO AND CULTURE - Abnormal    Color Urine Straw      Appearance Urine Clear      Glucose Urine >=1000 (*)     Bilirubin Urine Negative      Ketones Urine Negative      Specific Gravity Urine 1.010      Blood Urine Negative      pH Urine 6.0      Protein Albumin Urine Negative      Urobilinogen Urine Normal      Nitrite Urine Negative      Leukocyte Esterase Urine Negative      RBC Urine 0      WBC Urine 0     GLUCOSE BY METER - Abnormal    GLUCOSE BY METER POCT 250 (*)    LIPASE - Normal    Lipase 162     LACTIC ACID WHOLE BLOOD - Normal    Lactic Acid 1.3     COVID-19 VIRUS (CORONAVIRUS) BY PCR - Normal    SARS CoV2 PCR Negative     MAGNESIUM - Normal    Magnesium 2.1     PHOSPHORUS - Normal    Phosphorus 2.5     CBC WITH PLATELETS AND DIFFERENTIAL    WBC Count 7.7      RBC Count 4.77      Hemoglobin 13.4      Hematocrit 40.5      MCV 85      MCH 28.1      MCHC 33.1      RDW 13.3      Platelet Count 337      % Neutrophils 64      % Lymphocytes 28      % Monocytes 5      % Eosinophils 2      % Basophils 0      % Immature Granulocytes 1      NRBCs per 100 WBC 0      Absolute Neutrophils 5.0      Absolute Lymphocytes 2.1      Absolute Monocytes 0.4      Absolute Eosinophils 0.1      Absolute Basophils 0.0      Absolute Immature Granulocytes 0.1      Absolute NRBCs 0.0     GLUCOSE MONITOR NURSING POCT   GLUCOSE MONITOR NURSING POCT       Imaging Studies:   Recent Results (from the past 24 hour(s))   CT Abdomen Pelvis w  Contrast    Narrative    EXAM: CT ABDOMEN PELVIS W CONTRAST  LOCATION: [facility]  DATE/TIME: 12/24/2021 9:56 PM    INDICATION: Abdominal pain. Bowel obstruction suspected.  COMPARISON: 12/12/2021.  TECHNIQUE: CT scan of the abdomen and pelvis was performed following injection of IV contrast. Multiplanar reformats were obtained. Dose reduction techniques were used.  CONTRAST: Isovue 370    FINDINGS:   LOWER CHEST: Mild bibasilar atelectasis.    HEPATOBILIARY: Normal.    PANCREAS: Normal.    SPLEEN: Normal.    ADRENAL GLANDS: Normal.    KIDNEYS/BLADDER: Distended urinary bladder and mild bilateral hydronephrosis. Kidneys and bladder otherwise normal.    BOWEL: There are a few loops of mid small bowel which are moderately dilated and contain fluid in the lumen. The extent and distribution are similar to 12/12/2021. The stomach is also mildly distended with fluid and ingested material. Edema in the   mesentery of the affected small bowel is more pronounced compared to the prior study. No pneumatosis, perforation or abscess.    LYMPH NODES: Normal.    VASCULATURE: Mild aortoiliac atherosclerosis.    PELVIC ORGANS: Penile prosthesis in position. Mild enlargement of the prostate gland.    MUSCULOSKELETAL: Normal.      Impression    IMPRESSION:   1.  Mid small bowel obstruction. Findings are similar in extent and distribution to 12/12/2021. Edema in the mesentery of the affected small bowel is more pronounced compared to the prior study. Developing ischemia is possible. Clinical correlation and   follow-up recommended.  2.  Distended urinary bladder and mild bilateral hydronephrosis may be on the basis of bladder outlet obstruction secondary to the enlarged prostate gland.   XR Abdomen Port 1 View    Narrative    EXAM: XR ABDOMEN PORT 1 VIEWS  LOCATION: [facility]  DATE/TIME: 12/24/2021 11:28 PM    INDICATION: NG tube  "placement  COMPARISON: CT abdomen pelvis 12/24/2021      Impression    IMPRESSION: NG tube kinked within the proximal stomach with the tip oriented in a retrograde fashion towards the GE junction. This should be repositioned. Marked gastric distention with air. Numerous gas distended loops of small bowel and colon.        Recent vital signs:   /71   Pulse 95   Temp 98.1  F (36.7  C) (Oral)   Resp 16   Ht 1.626 m (5' 4\")   Wt 72.1 kg (159 lb)   SpO2 98%   BMI 27.29 kg/m      [place] Coma Scale Score: 15 (12/25/21 0707)       Cardiac Rhythm: Other - no indication to monitor  Pt needs tele? No  Skin/wound Issues: None     Code Status: Full Code    Pain control: good    Nausea control: pt had none    Abnormal labs/tests/findings requiring intervention: see results tab    Family present during ED course? No   Family Comments/Social Situation comments: NA    Tasks needing completion: None    Opal Mcnulty RN  [facility] -- [phone] [facility]    "

## 2021-12-26 LAB
ALBUMIN SERPL-MCNC: 2.6 G/DL (ref 3.4–5)
ALP SERPL-CCNC: 94 U/L (ref 40–150)
ALT SERPL W P-5'-P-CCNC: 16 U/L (ref 0–70)
ANION GAP SERPL CALCULATED.3IONS-SCNC: 6 MMOL/L (ref 3–14)
AST SERPL W P-5'-P-CCNC: 14 U/L (ref 0–45)
BILIRUB SERPL-MCNC: 0.3 MG/DL (ref 0.2–1.3)
BUN SERPL-MCNC: 3 MG/DL (ref 7–30)
CALCIUM SERPL-MCNC: 8.1 MG/DL (ref 8.5–10.1)
CHLORIDE BLD-SCNC: 109 MMOL/L (ref 94–109)
CO2 SERPL-SCNC: 24 MMOL/L (ref 20–32)
CREAT SERPL-MCNC: 0.57 MG/DL (ref 0.66–1.25)
ERYTHROCYTE [DISTWIDTH] IN BLOOD BY AUTOMATED COUNT: 13.2 % (ref 10–15)
GFR SERPL CREATININE-BSD FRML MDRD: >90 ML/MIN/1.73M2
GLUCOSE BLD-MCNC: 191 MG/DL (ref 70–99)
GLUCOSE BLDC GLUCOMTR-MCNC: 165 MG/DL (ref 70–99)
GLUCOSE BLDC GLUCOMTR-MCNC: 196 MG/DL (ref 70–99)
GLUCOSE BLDC GLUCOMTR-MCNC: 262 MG/DL (ref 70–99)
GLUCOSE BLDC GLUCOMTR-MCNC: 296 MG/DL (ref 70–99)
GLUCOSE BLDC GLUCOMTR-MCNC: 359 MG/DL (ref 70–99)
GLUCOSE BLDC GLUCOMTR-MCNC: 361 MG/DL (ref 70–99)
GLUCOSE BLDC GLUCOMTR-MCNC: 370 MG/DL (ref 70–99)
HCT VFR BLD AUTO: 35.3 % (ref 40–53)
HGB BLD-MCNC: 11.8 G/DL (ref 13.3–17.7)
MAGNESIUM SERPL-MCNC: 2.1 MG/DL (ref 1.6–2.3)
MCH RBC QN AUTO: 28.2 PG (ref 26.5–33)
MCHC RBC AUTO-ENTMCNC: 33.4 G/DL (ref 31.5–36.5)
MCV RBC AUTO: 84 FL (ref 78–100)
PHOSPHATE SERPL-MCNC: 2.7 MG/DL (ref 2.5–4.5)
PLATELET # BLD AUTO: 345 10E3/UL (ref 150–450)
POTASSIUM BLD-SCNC: 3.1 MMOL/L (ref 3.4–5.3)
PROT SERPL-MCNC: 5.9 G/DL (ref 6.8–8.8)
RBC # BLD AUTO: 4.18 10E6/UL (ref 4.4–5.9)
SODIUM SERPL-SCNC: 139 MMOL/L (ref 133–144)
WBC # BLD AUTO: 5.1 10E3/UL (ref 4–11)

## 2021-12-26 PROCEDURE — 99232 SBSQ HOSP IP/OBS MODERATE 35: CPT | Mod: GC | Performed by: STUDENT IN AN ORGANIZED HEALTH CARE EDUCATION/TRAINING PROGRAM

## 2021-12-26 PROCEDURE — 83735 ASSAY OF MAGNESIUM: CPT | Performed by: STUDENT IN AN ORGANIZED HEALTH CARE EDUCATION/TRAINING PROGRAM

## 2021-12-26 PROCEDURE — 120N000002 HC R&B MED SURG/OB UMMC

## 2021-12-26 PROCEDURE — 36415 COLL VENOUS BLD VENIPUNCTURE: CPT | Performed by: STUDENT IN AN ORGANIZED HEALTH CARE EDUCATION/TRAINING PROGRAM

## 2021-12-26 PROCEDURE — 250N000013 HC RX MED GY IP 250 OP 250 PS 637: Performed by: STUDENT IN AN ORGANIZED HEALTH CARE EDUCATION/TRAINING PROGRAM

## 2021-12-26 PROCEDURE — 250N000012 HC RX MED GY IP 250 OP 636 PS 637: Performed by: STUDENT IN AN ORGANIZED HEALTH CARE EDUCATION/TRAINING PROGRAM

## 2021-12-26 PROCEDURE — 85027 COMPLETE CBC AUTOMATED: CPT | Performed by: STUDENT IN AN ORGANIZED HEALTH CARE EDUCATION/TRAINING PROGRAM

## 2021-12-26 PROCEDURE — 94660 CPAP INITIATION&MGMT: CPT

## 2021-12-26 PROCEDURE — 80053 COMPREHEN METABOLIC PANEL: CPT | Performed by: STUDENT IN AN ORGANIZED HEALTH CARE EDUCATION/TRAINING PROGRAM

## 2021-12-26 PROCEDURE — 84100 ASSAY OF PHOSPHORUS: CPT | Performed by: STUDENT IN AN ORGANIZED HEALTH CARE EDUCATION/TRAINING PROGRAM

## 2021-12-26 RX ORDER — KETOROLAC TROMETHAMINE 15 MG/ML
15 INJECTION, SOLUTION INTRAMUSCULAR; INTRAVENOUS ONCE
Status: COMPLETED | OUTPATIENT
Start: 2021-12-26 | End: 2021-12-27

## 2021-12-26 RX ADMIN — BICTEGRAVIR SODIUM, EMTRICITABINE, AND TENOFOVIR ALAFENAMIDE FUMARATE 1 TABLET: 50; 200; 25 TABLET ORAL at 12:53

## 2021-12-26 RX ADMIN — INSULIN DETEMIR 10 UNITS: 100 INJECTION, SOLUTION SUBCUTANEOUS at 10:50

## 2021-12-26 ASSESSMENT — ACTIVITIES OF DAILY LIVING (ADL)
ADLS_ACUITY_SCORE: 5

## 2021-12-26 NOTE — PLAN OF CARE
Time: Evening shift  Status: Stable, improving.  NEURO: Alert and oriented. Able to make needs known.  RESPIRATORY: Lungs clear. O2 sats99% on RA.   CARDIAC: HR  80s and regular.  GI/: Abdomen soft. Had very large BM on day shift today. Voiding without difficulty.  DIET: NPO. Could have ice but does not like ice. Test messaged primary team to allow clear liquids. See todays general  surgery note. Clears ordered and patient tolerating fine.  PAIN/NAUSEA:    IV ACCESS: PIV  ACTIVITY: Patient walking in cardenas independently and with good tolerance.   LAB: K+ replaced on day shift. Mag and phosphorus replaced this shift. Redraw in AM. Repeat K+ =3.9.  PLAN: If patient has SBO symptoms he has agreed to NG tube insertion during discussion with Dr johnson.

## 2021-12-26 NOTE — PROGRESS NOTES
Surgery note  12/26    Feeling better. Had BM. Tolerated clears. Denies abdominal pain or nausea.     Vitals wnl  Abdomen soft, non-tender, non-distended    54M well known to gen surg service with recurrent SBOs. Explained to him if he were to not tolerate regular diet today he may benefit from a laparoscopic exploration. Offered it to him regardless, and he declined.     Kevin Santana MD  PGY-5 Surgery

## 2021-12-26 NOTE — PROGRESS NOTES
[facility] Family Medicine Progress Note    Main Plans for Today     - advance to full liquid diet  - Adjust home insulin detemir from 18U BID to 10U BID  - HSSI for meal time coverage  - discontinue IVF    Assessment & Plan   Andrew Lockwood is a 54 year old male admitted on 12/24/2021. He has a history of recurrent small bowel obstructions (last admitted 12 December 2021), AIDS on Biktarvy, GERD, type 2 diabetes on long-term insulin, status post lap appendectomy, status post diagnostic laparoscopy x2, unstable housing, and is admitted for small bowel obstruction. He has had a bowel movement and tolerated a clear liquid diet. He continues admission for monitoring for recurrence of symptoms while diet is advance.    # Small bowel obstruction   # Abdominal pain - improved  # Nausea without vomiting - improved  Patient with history of recurrent small bowel obstructions following adhesions that developed after on appendectomy.  Was admitted with symptoms and CT findings consisted with obstruction. Patient endorses feeling better, stooling once 12/25, and tolerating CLD w/o N/V. Surgery has been consulted and has offered laprascopic exploration of abdomen which patient declined.    -General surgery consulted, appreciate recs  - advance to full liquid diet  - discontinue IVF  -Nausea control with Zofran and Compazine as needed  -Daily CBC, CMP, mag, phos  - K, Mg, Phos replacement     # Type II Diabetes on long term insulin  Poorly controlled, A1c on arrival 11.9 up from 10.6 on 21 October 2021.  Patient states that he has been taking his insulin as directed at home; unclear if adherence or inadequate coverage leading to elevation in A1c. Pt now tolearating CLD. Will resume home insulin at half strength while slowly advancing diet.  - Adjust home insulin detemir from 18U BID to 10U BID while advancing diet  - HSSI for meal time coverage  - Hold PTA insulin detemir  18U BID until patient is tolerating p.o. then resume  - Hold PTA Novolog 10U TID with meals     # HIV/AIDS  #History of CNS toxoplasmosis  HIV RNA not detectable and CD4 count 519 on 24 August 2021.  Lowest CD4 count 26 upon diagnosis in 2015.  Patient denies any recent febrile illnesses, endorses being able to regularly take his medication and states that he has been taking it daily despite recent abdominal pain and nausea.  -Continue PTA Bictarvy      # GERD  - HOLD PTA pantoprazole 40mg tablet qday     # Housing instability  - review SW consult with patient    # Pain Assessment:  Current Pain Score 12/25/2021   Patient currently in pain? yes   Pain score (0-10) -   Pain location -   Pain descriptors -   - Andrew is experiencing pain due to small bowel obstruction. Pain management was discussed and the plan was created in a collaborative fashion.  Andrew's response to the current recommendations: engaged  - Please see the plan for pain management as documented above      Diet: Advance Diet as Tolerated: Full Liquid Diet  Fluids: 125ml/hr NS  DVT Prophylaxis: Pneumatic Compression Devices  Code Status: Full Code    Disposition Plan   Expected discharge:  2 - 3 days, recommended to prior living arrangement once adequate pain management/ tolerating PO medications.       Entered: Radha Estrada 12/26/2021, 8:14 AM   Information in the above section will display in the discharge planner report.      The patient's care was discussed with staff attending, Dr. Marcus Jurado.     Radha Estrada DO  [facility] Family Medicine: PGY2  Pager: [phone]  Please see sticky note for cross cover information    Interval History    Patient endorses feeling better. Stooled once 12/25. Tolerated CLD w/o N/V. Surgery has been consulted and has offered laprascopic exploration of abdomen which patient declined.  Patient states he is hungry and would like to have a meal. No abdominal pain at baseline nor when changing  positions in bed.      Review of Systems  4 point ROS negative w/ exception of above.     Physical Exam   Vital Signs: Temp: 97.7  F (36.5  C) Temp src: Oral BP: 135/81 Pulse: 83   Resp: 16 SpO2: 99 % O2 Device: None (Room air)    Weight: 159 lbs 0 oz  Physical Exam  General: No acute distress, laying comfortably in bed.    Eyes: EOM in tact, sclera anicteric  HEENT: Mucous membranes tachy  Respiratory:  Speaking in full sentences on room air.  Cardiovascular: Regular rate peripheral extremities appear well perfused  GI: Abdomen is not distended, soft, nontender, no HSM palpated. No suprapubic tenderness.  Skin: Warm and dry  Musculoskeletal: Moves extremities spontaneously and equally  Neurologic: oriented, responds appropriately  Psychiatric: Affect appropriate to situation      Data   Recent Labs   Lab 12/26/21  0347 12/26/21  0124 12/26/21  0002 12/25/21  2200 12/25/21  1226 12/25/21  0950 12/25/21  0822 12/24/21  2331 12/24/21 2020   WBC  --   --   --   --   --   --  7.8  --  7.7   HGB  --   --   --   --   --   --  12.1*  --  13.4   MCV  --   --   --   --   --   --  86  --  85   PLT  --   --   --   --   --   --  367  --  337   NA  --   --   --   --   --   --  138  --  138   POTASSIUM  --   --   --  3.9  --  3.2* 3.2*  --  3.6   CHLORIDE  --   --   --   --   --   --  109  --  107   CO2  --   --   --   --   --   --  24  --  22   BUN  --   --   --   --   --   --  6*  --  10   CR  --   --   --   --   --   --  0.58*  --  0.58*   ANIONGAP  --   --   --   --   --   --  5  --  9   LINDA  --   --   --   --   --   --  7.8*  --  8.7   * 361* 359*  --    < >  --  242*   < > 312*   ALBUMIN  --   --   --   --   --   --  2.5*  --  3.1*   PROTTOTAL  --   --   --   --   --   --  5.9*  --  7.1   BILITOTAL  --   --   --   --   --   --  0.4  --  0.4   ALKPHOS  --   --   --   --   --   --  112  --  163*   ALT  --   --   --   --   --   --  16  --  20   AST  --   --   --   --   --   --  9  --  11   LIPASE  --   --   --   --    --   --   --   --  162    < > = values in this interval not displayed.     Recent Results (from the past 24 hour(s))   US Bladder w Pre and Post Void Residual    Narrative    Exam: US BLADDER W PRE AND POST VOID RESIDUAL, 12/25/2021 2:21 PM    Indication: Bladder distention hydronephrosis on CT    Comparison: None    Findings:     Transabdominal grayscale sonographic evaluation of the kidneys and  urinary bladder pre and postvoid. Patient voided prior to initial  evaluation . Patient was unable to void again. Penile prosthesis is  seen anterior to the urinary bladder. Small postvoid residual about   50 mL.    Bilateral kidneys with normal cortical thickness, cortical medullary  differentiation and echogenicity. No shadowing stones or  hydronephrosis.      Impression    Impression:     1. Small post void residual urine seen within the urinary bladder.   2. No hydronephrosis demonstrated.    I have personally reviewed the examination and initial interpretation  and I agree with the findings.    LAURYN FOURNIER MD         SYSTEM ID:  [id number]     Medications       bictegravir-emtricitabine-tenofovir  1 tablet Oral Daily     insulin aspart  1-10 Units Subcutaneous TID AC     insulin aspart  1-7 Units Subcutaneous At Bedtime     insulin detemir  10 Units Subcutaneous BID     sodium chloride (PF)  3 mL Intracatheter Q8H

## 2021-12-26 NOTE — PLAN OF CARE
"/81   Pulse 83   Temp 97.7  F (36.5  C) (Oral)   Resp 16   Ht 1.626 m (5' 4\")   Wt 72.1 kg (159 lb)   SpO2 99%   BMI 27.29 kg/m        Neuros: alert and oriented. Denies any numbness/tingling.     Cardiac: WDL. Denies any chest pain.     Respiratory: WDL. On RA denies any SOB.     GI/Gu: Voiding. States he's passing flatus. Abd soft and non-tender.     Skin/Incisions: no new skin deficits.    Pain: Denies pain.     Diet: Clears. Continues to ask for food.     Activity: ind    Plan: Continue with POC. Pt stated that he \" is going to leave today\".     "

## 2021-12-26 NOTE — DISCHARGE SUMMARY
[facility] Discharge Summary       Date of Admission:  12/24/2021  Date of Discharge:  12/27/2021  Date of Service: 12/27/2021  Discharging Attending Provider: Dr. Marcus Jurado  Discharge Team: [facility] Service    Discharge Diagnoses      Acute abdominal pain  Hyperglycemia  Small bowel obstruction (H)  Type 2 diabetes mellitus with other specified complication, with long-term current use of insulin (H)  Human immunodeficiency virus I infection (H)  Type 2 diabetes mellitus with hyperglycemia, with long-term current use of insulin (H)  COVID-19 ruled out by laboratory testing    Follow-ups Needed After Discharge     - follow up with PCP for hospital follow up  - return to the ED for return of symptoms      Hospital Course   Andrew Lockwood was admitted on 12/24/2021 for SBO.  The following problems were addressed during his hospitalization:    # Small bowel obstruction   # Abdominal pain - improved  # Nausea without vomiting - improved  Patient with history of recurrent small bowel obstructions following adhesions that developed after on appendectomy.  Was admitted with symptoms and CT findings consisted with obstruction. Surgery was consulted during this stay and offered laprascopic exploration of abdomen which patient declined. Abdominal pain improved. Stooled x2 12/27 and was tolerating a full diet when requested discharge.     # Type II Diabetes on long term insulin  Poorly controlled, A1c on arrival 11.9 up from 10.6 on 21 October 2021.  Patient states that he has been taking his insulin as directed at home; unclear if adherence or inadequate coverage leading to elevation in A1c.  -PTA Novolog 10U TID with meals  -PTA insulin detemir 18U BID until patient is tolerating p.o. then resume    #HIV/AIDS  #History of CNS toxoplasmosis  HIV RNA not detectable and CD4 count 519 on 24 August 2021.  Lowest CD4 count 26 upon diagnosis  in 2015.  Patient denies any recent febrile illnesses, endorses being able to regularly take his medication and states that he has been taking it daily despite recent abdominal pain and nausea.    # GERD  - PTA pantoprazole 40mg tablet qday    # Discharge Pain Plan:   - Patient currently has NO PAIN and is not being prescribed pain medications on discharge.    Consultations This Hospital Stay   SURGERY GENERAL ADULT IP CONSULT  SOCIAL WORK IP CONSULT    Code Status   Full Code       The patient was discussed with Dr. Marcus Jurado.    [facility]  [facility]   Pager: [phone]  ______________________________________________________________________  Review of Systems   5 point ROS negative     Physical Exam   Vital Signs: Temp: 98.8  F (37.1  C) Temp src: Oral BP: (!) 140/80 Pulse: 73   Resp: 18 SpO2: 97 % O2 Device: None (Room air)    Weight: 159 lbs 0 oz    Physical Exam  General: No acute distress, laying comfortably in bed.    Eyes: EOM in tact, sclera anicteric  HEENT: Mucous membranes tachy  Respiratory:  Speaking in full sentences on room air.  Cardiovascular: Regular rate peripheral extremities appear well perfused  GI: Abdomen is not distended, soft, nontender, no HSM palpated. No suprapubic tenderness.  Skin: Warm and dry  Musculoskeletal: Moves extremities spontaneously and equally  Neurologic: oriented, responds appropriately  Psychiatric: Affect appropriate to situation    Significant Results and Procedures   Most Recent 3 CBC's:  Recent Labs   Lab Test 12/27/21  0905 12/26/21  0753 12/25/21  0822   WBC 6.1 5.1 7.8   HGB 11.5* 11.8* 12.1*   MCV 83 84 86    345 367     Most Recent 3 BMP's:  Recent Labs   Lab Test 12/27/21  1256 12/27/21  0911 12/27/21  0905 12/26/21  0849 12/26/21  0753 12/26/21  0002 12/25/21  2200 12/25/21  0950 12/25/21  0822   NA  --   --  136  --  139  --   --   --  138   POTASSIUM  --   --  3.4  --  3.1*  --  3.9   < >  3.2*   CHLORIDE  --   --  105  --  109  --   --   --  109   CO2  --   --  24  --  24  --   --   --  24   BUN  --   --  13  --  3*  --   --   --  6*   CR  --   --  0.65*  --  0.57*  --   --   --  0.58*   ANIONGAP  --   --  7  --  6  --   --   --  5   LINDA  --   --  8.6  --  8.1*  --   --   --  7.8*   * 307* 360*   < > 191*   < >  --    < > 242*    < > = values in this interval not displayed.       Pending Results   These results will be followed up by n/a  Unresulted Labs Ordered in the Past 30 Days of this Admission     No orders found from 11/24/2021 to 12/25/2021.             Primary Care Physician   Ayala Prieto    Discharge Disposition   Discharged to prior arrangement  Condition at discharge: Stable    Discharge Orders      Reason for your hospital stay    You were hospitalized for a small bowel obstruction. Surgery offered a laparoscopic exploration of your abdomen which was declined. You improved with bowel rest.     Activity    Your activity upon discharge: activity as tolerated     Adult [facility] Follow-up and recommended labs and tests    Follow up with primary care provider, Ayala Prieto, within 7 days for hospital follow- up.  No follow up labs or test are needed.      Appointments on [place] and/or [facility] (with [facility] or [facility] provider or service). Call [phone] if you haven't heard regarding these appointments within 7 days of discharge.     Diet    Follow this diet upon discharge: Low residue     Discharge Medications   Current Discharge Medication List      CONTINUE these medications which have NOT CHANGED    Details   Alcohol Swabs PADS Use to swab the area of the injection or todd as directed Per insurance coverage  Qty: 100 each, Refills: 0    Associated Diagnoses: Type 2 diabetes mellitus with hyperglycemia, with long-term current use of insulin (H)      bictegravir-emtricitabine-tenofovir (BIKTARVY) -25 MG per tablet Take 1 tablet by mouth daily  Qty: 30 tablet,  Refills: 6    Associated Diagnoses: Human immunodeficiency virus (HIV) disease (H)      blood glucose (NO BRAND SPECIFIED) test strip Use to test blood sugar 4 times daily or as directed. To accompany:whatever is covered  Qty: 360 strip, Refills: 3    Associated Diagnoses: Type 2 diabetes mellitus with other specified complication, with long-term current use of insulin (H)      blood glucose calibration (NO BRAND SPECIFIED) solution Used to calibrate the blood glucose monitor as needed and as directed.  To accompany  blood glucose brands per insurance coverage  Qty: 1 each, Refills: 0    Associated Diagnoses: Type 2 diabetes mellitus with hyperglycemia, with long-term current use of insulin (H)      blood glucose monitoring (NO BRAND SPECIFIED) meter device kit Use as directed. Per insurance coverage.  Qty: 1 kit, Refills: 0    Associated Diagnoses: Type 2 diabetes mellitus with hyperglycemia, with long-term current use of insulin (H)      glucose 40 % (400 mg/mL) gel 15 g every 15 minutes by mouth as needed for low blood sugar. Oral gel is preferable for conscious and able to swallow patient.  Qty: 112.5 g, Refills: 0    Associated Diagnoses: Type 2 diabetes mellitus with hyperglycemia, with long-term current use of insulin (H)      insulin detemir (LEVEMIR PEN) 100 UNIT/ML pen Inject 18 Units Subcutaneous 2 times daily  Qty: 100 mL, Refills: 0    Associated Diagnoses: Type 2 diabetes mellitus with hyperglycemia, with long-term current use of insulin (H)      insulin pen needle (BD SCOTT U/F) 32G X 4 MM miscellaneous USE 3 TO 4 NEEDLES DAILY  Qty: 400 each, Refills: 3    Associated Diagnoses: Type 2 diabetes mellitus with hyperglycemia, without long-term current use of insulin (H)      NOVOLOG FLEXPEN 100 UNIT/ML soln Inject 10 Units Subcutaneous 3 times daily (with meals)  Qty: 75 mL, Refills: 0    Associated Diagnoses: Type 2 diabetes mellitus with hyperglycemia, with long-term current use of insulin (H)     "  pantoprazole (PROTONIX) 40 MG EC tablet Take 1 tablet (40 mg) by mouth daily  Qty: 30 tablet, Refills: 6    Associated Diagnoses: Gastroesophageal reflux disease with esophagitis without hemorrhage      polyethylene glycol (MIRALAX) 17 GM/Dose powder Take 17 g by mouth daily as needed  Qty: 510 g, Refills: 0    Associated Diagnoses: Closed fracture of distal ends of right radius and ulna with routine healing, subsequent encounter      SENNA-docusate sodium (SENNA S) 8.6-50 MG tablet Take 1 tablet by mouth 2 times daily as needed (constipation)  Qty: 30 tablet, Refills: 0      Sharps Container MISC Use as directed to dispose of needles, lancets and other sharps. Per Insurance coverage  Qty: 1 each, Refills: 0    Associated Diagnoses: Type 2 diabetes mellitus with hyperglycemia, with long-term current use of insulin (H)      STATIN NOT PRESCRIBED (INTENTIONAL) Please choose reason not prescribed, below    Associated Diagnoses: Type 2 diabetes mellitus with complication, without long-term current use of insulin (H)      thin (NO BRAND SPECIFIED) lancets Use with lanceting device. To accompany: Test 4 times daily with whatever is covered  Qty: 360 each, Refills: 3    Associated Diagnoses: Type 2 diabetes mellitus with other specified complication, with long-term current use of insulin (H)           Allergies   Allergies   Allergen Reactions     Metformin      Abdominal pain     Tylenol [Acetaminophen] Itching     Dulaglutide Rash     Penicillin V Other (See Comments) and Rash     Diffuse maculopapular rash + feels \"high\", per pt.        "

## 2021-12-26 NOTE — PROGRESS NOTES
Brief progress note    Spoke to patient at bedside.  General surgery has cleared him for clear liquid diet.  He has had 1 large bowel movement today per nursing report.  Discussed that we will need to go slowly with a clear liquid diet even though he is hungry.  Discussed that while it is reassuring, if he were to develop nausea, abdominal pain, at that point I would very strongly urged him to allow for an NG tube to be placed to aid in decompression of the stomach.  During our conversation he agreed that if advancing his diet caused obstructive-like symptoms he would consent to a NG tube being placed tonight.    Sujata Bermudez MD  [id number]

## 2021-12-26 NOTE — PROGRESS NOTES
BiPAP unit brought to patient room, patient very interested in wearing BiPAP. Placed on patient, using CPAP settings  0040, pt refusing BiPAP. BiPAP pulled from room  Does not wear CPAP @ home, will d/C order

## 2021-12-27 VITALS
HEIGHT: 64 IN | WEIGHT: 159 LBS | RESPIRATION RATE: 18 BRPM | SYSTOLIC BLOOD PRESSURE: 140 MMHG | OXYGEN SATURATION: 97 % | BODY MASS INDEX: 27.14 KG/M2 | HEART RATE: 73 BPM | TEMPERATURE: 98.8 F | DIASTOLIC BLOOD PRESSURE: 80 MMHG

## 2021-12-27 LAB
ALBUMIN SERPL-MCNC: 2.7 G/DL (ref 3.4–5)
ALP SERPL-CCNC: 123 U/L (ref 40–150)
ALT SERPL W P-5'-P-CCNC: 15 U/L (ref 0–70)
ANION GAP SERPL CALCULATED.3IONS-SCNC: 7 MMOL/L (ref 3–14)
AST SERPL W P-5'-P-CCNC: 6 U/L (ref 0–45)
BILIRUB SERPL-MCNC: 0.3 MG/DL (ref 0.2–1.3)
BUN SERPL-MCNC: 13 MG/DL (ref 7–30)
CALCIUM SERPL-MCNC: 8.6 MG/DL (ref 8.5–10.1)
CHLORIDE BLD-SCNC: 105 MMOL/L (ref 94–109)
CO2 SERPL-SCNC: 24 MMOL/L (ref 20–32)
CREAT SERPL-MCNC: 0.65 MG/DL (ref 0.66–1.25)
ERYTHROCYTE [DISTWIDTH] IN BLOOD BY AUTOMATED COUNT: 13 % (ref 10–15)
GFR SERPL CREATININE-BSD FRML MDRD: >90 ML/MIN/1.73M2
GLUCOSE BLD-MCNC: 360 MG/DL (ref 70–99)
GLUCOSE BLDC GLUCOMTR-MCNC: 227 MG/DL (ref 70–99)
GLUCOSE BLDC GLUCOMTR-MCNC: 280 MG/DL (ref 70–99)
GLUCOSE BLDC GLUCOMTR-MCNC: 292 MG/DL (ref 70–99)
GLUCOSE BLDC GLUCOMTR-MCNC: 307 MG/DL (ref 70–99)
HCT VFR BLD AUTO: 34.3 % (ref 40–53)
HGB BLD-MCNC: 11.5 G/DL (ref 13.3–17.7)
MAGNESIUM SERPL-MCNC: 1.9 MG/DL (ref 1.6–2.3)
MCH RBC QN AUTO: 27.8 PG (ref 26.5–33)
MCHC RBC AUTO-ENTMCNC: 33.5 G/DL (ref 31.5–36.5)
MCV RBC AUTO: 83 FL (ref 78–100)
PHOSPHATE SERPL-MCNC: 3 MG/DL (ref 2.5–4.5)
PLATELET # BLD AUTO: 348 10E3/UL (ref 150–450)
POTASSIUM BLD-SCNC: 3.4 MMOL/L (ref 3.4–5.3)
PROT SERPL-MCNC: 6.3 G/DL (ref 6.8–8.8)
RBC # BLD AUTO: 4.13 10E6/UL (ref 4.4–5.9)
SODIUM SERPL-SCNC: 136 MMOL/L (ref 133–144)
WBC # BLD AUTO: 6.1 10E3/UL (ref 4–11)

## 2021-12-27 PROCEDURE — 85027 COMPLETE CBC AUTOMATED: CPT | Performed by: STUDENT IN AN ORGANIZED HEALTH CARE EDUCATION/TRAINING PROGRAM

## 2021-12-27 PROCEDURE — 250N000011 HC RX IP 250 OP 636: Performed by: STUDENT IN AN ORGANIZED HEALTH CARE EDUCATION/TRAINING PROGRAM

## 2021-12-27 PROCEDURE — 99239 HOSP IP/OBS DSCHRG MGMT >30: CPT | Mod: GC | Performed by: STUDENT IN AN ORGANIZED HEALTH CARE EDUCATION/TRAINING PROGRAM

## 2021-12-27 PROCEDURE — 36415 COLL VENOUS BLD VENIPUNCTURE: CPT | Performed by: STUDENT IN AN ORGANIZED HEALTH CARE EDUCATION/TRAINING PROGRAM

## 2021-12-27 PROCEDURE — 80053 COMPREHEN METABOLIC PANEL: CPT | Performed by: STUDENT IN AN ORGANIZED HEALTH CARE EDUCATION/TRAINING PROGRAM

## 2021-12-27 PROCEDURE — 83735 ASSAY OF MAGNESIUM: CPT | Performed by: STUDENT IN AN ORGANIZED HEALTH CARE EDUCATION/TRAINING PROGRAM

## 2021-12-27 PROCEDURE — 250N000013 HC RX MED GY IP 250 OP 250 PS 637: Performed by: STUDENT IN AN ORGANIZED HEALTH CARE EDUCATION/TRAINING PROGRAM

## 2021-12-27 PROCEDURE — 84100 ASSAY OF PHOSPHORUS: CPT | Performed by: STUDENT IN AN ORGANIZED HEALTH CARE EDUCATION/TRAINING PROGRAM

## 2021-12-27 RX ORDER — POTASSIUM CHLORIDE 750 MG/1
40 TABLET, EXTENDED RELEASE ORAL ONCE
Status: COMPLETED | OUTPATIENT
Start: 2021-12-27 | End: 2021-12-27

## 2021-12-27 RX ADMIN — KETOROLAC TROMETHAMINE 15 MG: 15 INJECTION, SOLUTION INTRAMUSCULAR; INTRAVENOUS at 00:17

## 2021-12-27 RX ADMIN — POTASSIUM CHLORIDE 40 MEQ: 750 TABLET, EXTENDED RELEASE ORAL at 12:59

## 2021-12-27 RX ADMIN — BICTEGRAVIR SODIUM, EMTRICITABINE, AND TENOFOVIR ALAFENAMIDE FUMARATE 1 TABLET: 50; 200; 25 TABLET ORAL at 09:10

## 2021-12-27 ASSESSMENT — ACTIVITIES OF DAILY LIVING (ADL)
ADLS_ACUITY_SCORE: 5

## 2021-12-27 NOTE — PROGRESS NOTES
Patient left the room asking nurse for a mask and  Walk out the unit. Security is made aware. Patient was brought back by security. He is in room.

## 2021-12-27 NOTE — PROGRESS NOTES
[facility] Family Medicine Progress Note    Main Plans for Today     - Advance diet to full liquids  - adjust detemir to 14U BID  - consider adjusting evening dose detemir pending diet advancement    Assessment & Plan   Andrew Lockwood is a 54 year old male admitted on 12/24/2021. He has a history of recurrent small bowel obstructions (last admitted 12 December 2021), AIDS on Biktarvy, GERD, type 2 diabetes on long-term insulin, status post lap appendectomy, status post diagnostic laparoscopy x2, unstable housing, and is admitted for small bowel obstruction. He has had a bowel movement and tolerated a clear liquid diet. He continues admission for monitoring for recurrence of symptoms while diet is advanced.    # Small bowel obstruction   # Abdominal pain - improved  # Nausea without vomiting - improved  Patient with history of recurrent small bowel obstructions following adhesions that developed after on appendectomy.  Was admitted with symptoms and CT findings consisted with obstruction. Patient endorses feeling better, stooling once 12/25, and tolerating CLD w/o N/V. Surgery has been consulted and has offered laprascopic exploration of abdomen which patient declined.    -General surgery consulted, appreciate recs  - advance to full liquid diet   - if intolerant/return of pain discuss NG placement  -Nausea control with Zofran and Compazine as needed  -Daily CBC, CMP, mag, phos  - K, Mg, Phos replacement     # Type II Diabetes on long term insulin  Poorly controlled, A1c on arrival 11.9 up from 10.6 on 21 October 2021.  Patient states that he has been taking his insulin as directed at home; unclear if adherence or inadequate coverage leading to elevation in A1c. Pt now tolearating CLD. Will resume home insulin at half strength while slowly advancing diet.  - Adjust home insulin detemir from 18U BID to 14U BID while advancing diet  - Consider adjustment of  detemir pending PO intake this afternoon   -consider non-dka insulin drip if pt to be admitted for prolonged period  - HSSI for meal time coverage  - Hold PTA insulin detemir 18U BID until patient is tolerating p.o. then resume  - Hold PTA Novolog 10U TID with meals     # HIV/AIDS  #History of CNS toxoplasmosis  HIV RNA not detectable and CD4 count 519 on 24 August 2021.  Lowest CD4 count 26 upon diagnosis in 2015.  Patient denies any recent febrile illnesses, endorses being able to regularly take his medication and states that he has been taking it daily despite recent abdominal pain and nausea.  -Continue PTA Bictarvy      # GERD  - HOLD PTA pantoprazole 40mg tablet qday     # Housing instability  - review SW consult with patient    # Pain Assessment:  Current Pain Score 12/26/2021   Patient currently in pain? denies   Pain score (0-10) -   Pain location -   Pain descriptors -   - Andrew is experiencing pain due to small bowel obstruction. Pain management was discussed and the plan was created in a collaborative fashion.  Andrew's response to the current recommendations: engaged  - Please see the plan for pain management as documented above      Diet: Diet  Advance Diet as Tolerated: Clear Liquid Diet  Fluids: 125ml/hr NS  DVT Prophylaxis: Pneumatic Compression Devices  Code Status: Full Code    Disposition Plan   Expected discharge:  2 - 3 days, recommended to prior living arrangement once adequate pain management/ tolerating PO medications.       Entered: Radha Estrada 12/27/2021, 7:22 AM   Information in the above section will display in the discharge planner report.      The patient's care was discussed with staff attending, Dr. Marcus Jurado.     Radha Estrada DO  [facility] Family Medicine: PGY2  Pager: [phone]  Please see sticky note for cross cover information    Interval History    No abdominal pain. No nausea/vomiting. No abdominal gas. No stool. Feels ready for full diet already.  Refused GI cocktail overnight. Attempted heading off unit. Came back. No CP, no SOB, no dysuria.      Review of Systems  4 point ROS negative w/ exception of above.     Physical Exam   Vital Signs: Temp: 98.8  F (37.1  C) Temp src: Oral BP: 132/75 Pulse: 73   Resp: 16 SpO2: 98 % O2 Device: None (Room air)    Weight: 159 lbs 0 oz  Physical Exam  General: No acute distress, laying comfortably in bed.    Eyes: EOM in tact, sclera anicteric  HEENT: Mucous membranes tachy  Respiratory:  Speaking in full sentences on room air.  Cardiovascular: Regular rate peripheral extremities appear well perfused  GI: Abdomen is not distended, soft, nontender, no HSM palpated. No suprapubic tenderness.  Skin: Warm and dry  Musculoskeletal: Moves extremities spontaneously and equally  Neurologic: oriented, responds appropriately  Psychiatric: Affect appropriate to situation      Data   Recent Labs   Lab 12/27/21  0408 12/27/21  0026 12/26/21  2216 12/26/21  0849 12/26/21  0753 12/26/21  0002 12/25/21  2200 12/25/21  1226 12/25/21  0950 12/25/21  0822 12/24/21  2331 12/24/21 2020   WBC  --   --   --   --  5.1  --   --   --   --  7.8  --  7.7   HGB  --   --   --   --  11.8*  --   --   --   --  12.1*  --  13.4   MCV  --   --   --   --  84  --   --   --   --  86  --  85   PLT  --   --   --   --  345  --   --   --   --  367  --  337   NA  --   --   --   --  139  --   --   --   --  138  --  138   POTASSIUM  --   --   --   --  3.1*  --  3.9  --  3.2* 3.2*  --  3.6   CHLORIDE  --   --   --   --  109  --   --   --   --  109  --  107   CO2  --   --   --   --  24  --   --   --   --  24  --  22   BUN  --   --   --   --  3*  --   --   --   --  6*  --  10   CR  --   --   --   --  0.57*  --   --   --   --  0.58*  --  0.58*   ANIONGAP  --   --   --   --  6  --   --   --   --  5  --  9   LINDA  --   --   --   --  8.1*  --   --   --   --  7.8*  --  8.7   * 227* 262*   < > 191*   < >  --    < >  --  242*   < > 312*   ALBUMIN  --   --   --   --  2.6*   --   --   --   --  2.5*  --  3.1*   PROTTOTAL  --   --   --   --  5.9*  --   --   --   --  5.9*  --  7.1   BILITOTAL  --   --   --   --  0.3  --   --   --   --  0.4  --  0.4   ALKPHOS  --   --   --   --  94  --   --   --   --  112  --  163*   ALT  --   --   --   --  16  --   --   --   --  16  --  20   AST  --   --   --   --  14  --   --   --   --  9  --  11   LIPASE  --   --   --   --   --   --   --   --   --   --   --  162    < > = values in this interval not displayed.     No results found for this or any previous visit (from the past 24 hour(s)).  Medications       bictegravir-emtricitabine-tenofovir  1 tablet Oral Daily     insulin aspart  1-10 Units Subcutaneous TID AC     insulin aspart  1-7 Units Subcutaneous At Bedtime     insulin detemir  18 Units Subcutaneous BID     lidocaine viscous 2% 15 mL and maalox/mylanta w/ simethicone 15 mL (GI COCKTAIL)  30 mL Oral Once     sodium chloride (PF)  3 mL Intracatheter Q8H

## 2021-12-27 NOTE — PROGRESS NOTES
"Brief progress note    Called by nursing, patient reporting 9/10 abdominal pain after dinner.     Evaluated at bedside, abdomen not peritoneal, bowel sounds appropriate in all quadrants and no distension. Patient endorses passing gas, states \"now bowel obstruction, it's cramping pain... it's because I didn't eat for awhile and now I ate.\"     Explained concern for constipation provoking opioids, especially in the setting of a recent bowel obstruction. Offered GI cocktail, patient accepted but would prefer stronger pain medication. We will try to GI cocktail first, Toradol 15mg IV, if pain still 9/10 would abd XR and give one time 2mg PO dilaudid.     Diet de-escalated to clear liquids.     Sujata Bermudez MD  10:46 PM    "

## 2021-12-27 NOTE — PLAN OF CARE
Neuros: alert and oriented. Denies any numbness/tingling.      Cardiac: WDL. Denies any chest pain.      Respiratory: WDL. On RA denies any SOB.      GI/Gu: Voiding. Denied passing flatus. Denied having BM today. Abd soft and non-tender.      Skin/Incisions: no new skin deficits.     Pain: Denies pain.      Diet: diet advanced to regular after pt had full liquid diet and tolerated.     Activity:independent  B 165, 196 and 370; covered per sliding scale. Bs started to go up as pt eating more food.Pt was eating a lot of gram crackers once he was placed on regular diet.     Plan: Continue with POC.     Addendum  Pt reported abdominal pain rated 9/10; [facility] medicine provider, Dr. Yarelis Ernst got notified. Provider came at bed side to assess pt; pt placed pt to clear liquid diet and ordered GI cocktail.Provider stated to let her know if pain is still the same after GI cocktail.

## 2021-12-27 NOTE — CONSULTS
"Brief Social Work Note:    \"Andrew Lockwood is a 54 year old male admitted on 12/24/2021. He has a history of recurrent small bowel obstructions (last admitted 12 December 2021), AIDS on Biktarvy, GERD, type 2 diabetes on long-term insulin, status post lap appendectomy, status post diagnostic laparoscopy x2, unstable housing, and is admitted for small bowel obstruction.\"    SW is consulted due to pt's housing instability and asked to provide any resources if available to pt.   SW was also notified by pt's bedside nurse Kait that pt is requesting bus tokens for discharge transportation.     SW met with pt in pt's room to introduce self, address housing concerns and provide bus tokens. Pt confirmed needing bus tokens and SW provided pt with 2 bus tokens. Pt reported [facility] [address] [facility] Pt also reported he is moving into his own apartment next week after three years of waiting. Pt reported he got the housing through his HIV case management agency and the  are on site at the apartment building. SW expressed congratulations to pt. Pt denied any further SW needs or questions at this time.     Ina Nunez, RADHA, [facility]     [phone] (pager) [phone]  12/27/2021      "

## 2021-12-27 NOTE — PLAN OF CARE
"BP (!) 140/80 (BP Location: Left arm)   Pulse 73   Temp 98.8  F (37.1  C) (Oral)   Resp 18   Ht 1.626 m (5' 4\")   Wt 72.1 kg (159 lb)   SpO2 97%   BMI 27.29 kg/m      Neuro: A&O x4, able to make needs known.   Respiratory: Sats well on RA, denies SOB.   Cardiac: HTN w/in parameters, denies cardiac chest pain/symptoms.   GI/: Voiding spontaneously adequate amounts. Medium BM x2 today, +flatus, +BS.   Diet: Tolerating regular diet.   Pain/Nausea: Denies pain and nausea.   IV Access: No iv access   Labs: Potassium replaced. reviewed  Activity: up ad lexi, ambulating halls independently   Plan: discharged     Discharge paperwork was reviewed with patient. Pt expressed understanding. A copy was given to patient. Pt declined transport services. No discharge meds to pickup at pharmacy. All patient belongings were taken with patient.     "

## 2021-12-27 NOTE — PROVIDER NOTIFICATION
[facility], Dr. Yarelis Ernst is notified of pt's Bs 370 at 6 pm; pt received 10 units of insulin aspart subcutaneous.Pt was eating a lot of gramcracks prior to Bs check.Writer reinforced that eating lots of carb will keep his blood sugar up; besides the high carb diet reinforcement; pt ordered two quesadilla for dinner which has 144 grams of carb. Provider increased pt's evening and morning  dose of Levemir to 14 units from 10 units.

## 2021-12-27 NOTE — PLAN OF CARE
"/75 (BP Location: Left arm)   Pulse 73   Temp 98.8  F (37.1  C) (Oral)   Resp 16   Ht 1.626 m (5' 4\")   Wt 72.1 kg (159 lb)   SpO2 98%   BMI 27.29 kg/m       Cares: 4741-2912    Neuro. alert and oriented X4     Cardiac: Denies any chest pain.      Respiratory: saturates 98 % on RA     GI/Gu: Voids in urinal and stated having BM 12/25/21     Skin/Incisions: unable to assess. Patient refused     Pain: abdominal pain. Toradol 15 mg given once. Refused GI cocktail, just wanted the pain medication     Diet: downgrade from regular diet to clear liquid due to abdominal pan after eating     Activity:independent     Plan: Continue with POC.     "

## 2021-12-28 ENCOUNTER — PATIENT OUTREACH (OUTPATIENT)
Dept: CARE COORDINATION | Facility: CLINIC | Age: 56
End: 2021-12-28
Payer: COMMERCIAL

## 2021-12-28 DIAGNOSIS — Z71.89 OTHER SPECIFIED COUNSELING: ICD-10-CM

## 2021-12-28 NOTE — PROGRESS NOTES
Clinic Care Coordination Contact  [facility]/Voicemail       Clinical Data: Care Coordinator Outreach  Outreach attempted x 1.  Left message on patient's voicemail with call back information and requested return call.  Plan: Care Coordinator will try to reach patient again in 1-2 business days.          NAA Kendrick  [phone]  [facility]

## 2021-12-29 NOTE — PROGRESS NOTES
Clinic Care Coordination Contact  [facility]/Voicemail       Clinical Data: Care Coordinator Outreach  Outreach attempted x 2.  Left message on patient's voicemail with call back information and requested return call.  Plan: Care Coordinator will do no further outreaches at this time.        NAA Kendrick  [phone]  [facility] Resource [facility]

## 2022-01-12 ENCOUNTER — HOSPITAL ENCOUNTER (OUTPATIENT)
Facility: CLINIC | Age: 57
Setting detail: OBSERVATION
Discharge: LEFT BEFORE DISCHARGE | End: 2022-01-14
Attending: EMERGENCY MEDICINE | Admitting: EMERGENCY MEDICINE
Payer: COMMERCIAL

## 2022-01-12 ENCOUNTER — APPOINTMENT (OUTPATIENT)
Dept: CT IMAGING | Facility: CLINIC | Age: 57
End: 2022-01-12
Attending: EMERGENCY MEDICINE
Payer: COMMERCIAL

## 2022-01-12 DIAGNOSIS — R10.12 ABDOMINAL PAIN, LEFT UPPER QUADRANT: ICD-10-CM

## 2022-01-12 LAB
ALBUMIN SERPL-MCNC: 3.6 G/DL (ref 3.4–5)
ALP SERPL-CCNC: 156 U/L (ref 40–150)
ALT SERPL W P-5'-P-CCNC: 20 U/L (ref 0–70)
ANION GAP SERPL CALCULATED.3IONS-SCNC: 7 MMOL/L (ref 3–14)
AST SERPL W P-5'-P-CCNC: 8 U/L (ref 0–45)
BASOPHILS # BLD AUTO: 0.1 10E3/UL (ref 0–0.2)
BASOPHILS NFR BLD AUTO: 1 %
BILIRUB SERPL-MCNC: 0.5 MG/DL (ref 0.2–1.3)
BUN SERPL-MCNC: 15 MG/DL (ref 7–30)
CALCIUM SERPL-MCNC: 10.2 MG/DL (ref 8.5–10.1)
CHLORIDE BLD-SCNC: 101 MMOL/L (ref 94–109)
CO2 SERPL-SCNC: 25 MMOL/L (ref 20–32)
CREAT SERPL-MCNC: 0.67 MG/DL (ref 0.66–1.25)
EOSINOPHIL # BLD AUTO: 0.2 10E3/UL (ref 0–0.7)
EOSINOPHIL NFR BLD AUTO: 2 %
ERYTHROCYTE [DISTWIDTH] IN BLOOD BY AUTOMATED COUNT: 13.4 % (ref 10–15)
GFR SERPL CREATININE-BSD FRML MDRD: >90 ML/MIN/1.73M2
GLUCOSE BLD-MCNC: 362 MG/DL (ref 70–99)
GLUCOSE BLDC GLUCOMTR-MCNC: 167 MG/DL (ref 70–99)
HCT VFR BLD AUTO: 43.4 % (ref 40–53)
HGB BLD-MCNC: 14.6 G/DL (ref 13.3–17.7)
HOLD SPECIMEN: NORMAL
IMM GRANULOCYTES # BLD: 0.1 10E3/UL
IMM GRANULOCYTES NFR BLD: 1 %
LACTATE SERPL-SCNC: 1.2 MMOL/L (ref 0.7–2)
LIPASE SERPL-CCNC: 295 U/L (ref 73–393)
LYMPHOCYTES # BLD AUTO: 2.6 10E3/UL (ref 0.8–5.3)
LYMPHOCYTES NFR BLD AUTO: 25 %
MAGNESIUM SERPL-MCNC: 1.9 MG/DL (ref 1.6–2.3)
MCH RBC QN AUTO: 27.9 PG (ref 26.5–33)
MCHC RBC AUTO-ENTMCNC: 33.6 G/DL (ref 31.5–36.5)
MCV RBC AUTO: 83 FL (ref 78–100)
MONOCYTES # BLD AUTO: 0.5 10E3/UL (ref 0–1.3)
MONOCYTES NFR BLD AUTO: 4 %
NEUTROPHILS # BLD AUTO: 7.1 10E3/UL (ref 1.6–8.3)
NEUTROPHILS NFR BLD AUTO: 67 %
NRBC # BLD AUTO: 0 10E3/UL
NRBC BLD AUTO-RTO: 0 /100
PHOSPHATE SERPL-MCNC: 3.2 MG/DL (ref 2.5–4.5)
PLATELET # BLD AUTO: 476 10E3/UL (ref 150–450)
POTASSIUM BLD-SCNC: 4.1 MMOL/L (ref 3.4–5.3)
PROT SERPL-MCNC: 8 G/DL (ref 6.8–8.8)
RBC # BLD AUTO: 5.24 10E6/UL (ref 4.4–5.9)
SODIUM SERPL-SCNC: 133 MMOL/L (ref 133–144)
WBC # BLD AUTO: 10.5 10E3/UL (ref 4–11)

## 2022-01-12 PROCEDURE — G0378 HOSPITAL OBSERVATION PER HR: HCPCS

## 2022-01-12 PROCEDURE — 250N000013 HC RX MED GY IP 250 OP 250 PS 637: Performed by: PHYSICIAN ASSISTANT

## 2022-01-12 PROCEDURE — 96372 THER/PROPH/DIAG INJ SC/IM: CPT | Mod: XS | Performed by: PHYSICIAN ASSISTANT

## 2022-01-12 PROCEDURE — 82962 GLUCOSE BLOOD TEST: CPT

## 2022-01-12 PROCEDURE — 82040 ASSAY OF SERUM ALBUMIN: CPT | Performed by: EMERGENCY MEDICINE

## 2022-01-12 PROCEDURE — 250N000011 HC RX IP 250 OP 636: Performed by: EMERGENCY MEDICINE

## 2022-01-12 PROCEDURE — 83735 ASSAY OF MAGNESIUM: CPT | Performed by: PHYSICIAN ASSISTANT

## 2022-01-12 PROCEDURE — 83690 ASSAY OF LIPASE: CPT | Performed by: EMERGENCY MEDICINE

## 2022-01-12 PROCEDURE — 83605 ASSAY OF LACTIC ACID: CPT | Performed by: EMERGENCY MEDICINE

## 2022-01-12 PROCEDURE — 96361 HYDRATE IV INFUSION ADD-ON: CPT | Performed by: EMERGENCY MEDICINE

## 2022-01-12 PROCEDURE — 74177 CT ABD & PELVIS W/CONTRAST: CPT | Mod: 26 | Performed by: RADIOLOGY

## 2022-01-12 PROCEDURE — 99285 EMERGENCY DEPT VISIT HI MDM: CPT | Mod: 25 | Performed by: EMERGENCY MEDICINE

## 2022-01-12 PROCEDURE — 96374 THER/PROPH/DIAG INJ IV PUSH: CPT | Mod: 59 | Performed by: EMERGENCY MEDICINE

## 2022-01-12 PROCEDURE — 85025 COMPLETE CBC W/AUTO DIFF WBC: CPT | Performed by: EMERGENCY MEDICINE

## 2022-01-12 PROCEDURE — 258N000003 HC RX IP 258 OP 636: Performed by: PHYSICIAN ASSISTANT

## 2022-01-12 PROCEDURE — 99207 PR APP CREDIT; MD BILLING SHARED VISIT: CPT | Performed by: PHYSICIAN ASSISTANT

## 2022-01-12 PROCEDURE — 99220 PR INITIAL OBSERVATION CARE,LEVEL III: CPT | Mod: FS | Performed by: PEDIATRICS

## 2022-01-12 PROCEDURE — 84100 ASSAY OF PHOSPHORUS: CPT | Performed by: PHYSICIAN ASSISTANT

## 2022-01-12 PROCEDURE — 36415 COLL VENOUS BLD VENIPUNCTURE: CPT | Performed by: EMERGENCY MEDICINE

## 2022-01-12 PROCEDURE — 99285 EMERGENCY DEPT VISIT HI MDM: CPT | Performed by: EMERGENCY MEDICINE

## 2022-01-12 PROCEDURE — 74177 CT ABD & PELVIS W/CONTRAST: CPT

## 2022-01-12 PROCEDURE — 80053 COMPREHEN METABOLIC PANEL: CPT | Performed by: EMERGENCY MEDICINE

## 2022-01-12 PROCEDURE — 250N000012 HC RX MED GY IP 250 OP 636 PS 637: Performed by: PHYSICIAN ASSISTANT

## 2022-01-12 PROCEDURE — 258N000003 HC RX IP 258 OP 636: Performed by: EMERGENCY MEDICINE

## 2022-01-12 PROCEDURE — 96376 TX/PRO/DX INJ SAME DRUG ADON: CPT | Performed by: EMERGENCY MEDICINE

## 2022-01-12 RX ORDER — NICOTINE POLACRILEX 4 MG
15-30 LOZENGE BUCCAL
Status: DISCONTINUED | OUTPATIENT
Start: 2022-01-12 | End: 2022-01-14 | Stop reason: HOSPADM

## 2022-01-12 RX ORDER — SODIUM CHLORIDE 9 MG/ML
INJECTION, SOLUTION INTRAVENOUS CONTINUOUS
Status: DISCONTINUED | OUTPATIENT
Start: 2022-01-12 | End: 2022-01-12

## 2022-01-12 RX ORDER — SODIUM CHLORIDE, SODIUM LACTATE, POTASSIUM CHLORIDE, CALCIUM CHLORIDE 600; 310; 30; 20 MG/100ML; MG/100ML; MG/100ML; MG/100ML
INJECTION, SOLUTION INTRAVENOUS CONTINUOUS
Status: ACTIVE | OUTPATIENT
Start: 2022-01-12 | End: 2022-01-14

## 2022-01-12 RX ORDER — DEXTROSE MONOHYDRATE 25 G/50ML
25-50 INJECTION, SOLUTION INTRAVENOUS
Status: DISCONTINUED | OUTPATIENT
Start: 2022-01-12 | End: 2022-01-14 | Stop reason: HOSPADM

## 2022-01-12 RX ORDER — ONDANSETRON 4 MG/1
4 TABLET, ORALLY DISINTEGRATING ORAL EVERY 6 HOURS PRN
Status: DISCONTINUED | OUTPATIENT
Start: 2022-01-12 | End: 2022-01-14 | Stop reason: HOSPADM

## 2022-01-12 RX ORDER — PANTOPRAZOLE SODIUM 40 MG/1
40 TABLET, DELAYED RELEASE ORAL DAILY
Status: DISCONTINUED | OUTPATIENT
Start: 2022-01-13 | End: 2022-01-14 | Stop reason: HOSPADM

## 2022-01-12 RX ORDER — HYDROMORPHONE HYDROCHLORIDE 1 MG/ML
0.5 INJECTION, SOLUTION INTRAMUSCULAR; INTRAVENOUS; SUBCUTANEOUS ONCE
Status: COMPLETED | OUTPATIENT
Start: 2022-01-12 | End: 2022-01-12

## 2022-01-12 RX ORDER — HYDROMORPHONE HYDROCHLORIDE 1 MG/ML
0.5 INJECTION, SOLUTION INTRAMUSCULAR; INTRAVENOUS; SUBCUTANEOUS EVERY 6 HOURS PRN
Status: DISCONTINUED | OUTPATIENT
Start: 2022-01-12 | End: 2022-01-13

## 2022-01-12 RX ORDER — OXYCODONE HYDROCHLORIDE 5 MG/1
5 TABLET ORAL EVERY 6 HOURS PRN
Status: DISCONTINUED | OUTPATIENT
Start: 2022-01-12 | End: 2022-01-14 | Stop reason: HOSPADM

## 2022-01-12 RX ORDER — IOPAMIDOL 755 MG/ML
97 INJECTION, SOLUTION INTRAVASCULAR ONCE
Status: COMPLETED | OUTPATIENT
Start: 2022-01-12 | End: 2022-01-12

## 2022-01-12 RX ORDER — ONDANSETRON 2 MG/ML
4 INJECTION INTRAMUSCULAR; INTRAVENOUS EVERY 6 HOURS PRN
Status: DISCONTINUED | OUTPATIENT
Start: 2022-01-12 | End: 2022-01-14 | Stop reason: HOSPADM

## 2022-01-12 RX ADMIN — INSULIN DETEMIR 9 UNITS: 100 INJECTION, SOLUTION SUBCUTANEOUS at 19:40

## 2022-01-12 RX ADMIN — IOPAMIDOL 97 ML: 755 INJECTION, SOLUTION INTRAVENOUS at 14:30

## 2022-01-12 RX ADMIN — HYDROMORPHONE HYDROCHLORIDE 0.5 MG: 1 INJECTION, SOLUTION INTRAMUSCULAR; INTRAVENOUS; SUBCUTANEOUS at 13:53

## 2022-01-12 RX ADMIN — HYDROMORPHONE HYDROCHLORIDE 0.5 MG: 1 INJECTION, SOLUTION INTRAMUSCULAR; INTRAVENOUS; SUBCUTANEOUS at 16:27

## 2022-01-12 RX ADMIN — SODIUM CHLORIDE 1000 ML: 9 INJECTION, SOLUTION INTRAVENOUS at 12:51

## 2022-01-12 RX ADMIN — SODIUM CHLORIDE, POTASSIUM CHLORIDE, SODIUM LACTATE AND CALCIUM CHLORIDE: 600; 310; 30; 20 INJECTION, SOLUTION INTRAVENOUS at 19:40

## 2022-01-12 RX ADMIN — OXYCODONE HYDROCHLORIDE 5 MG: 5 TABLET ORAL at 19:40

## 2022-01-12 ASSESSMENT — ENCOUNTER SYMPTOMS
NAUSEA: 0
ABDOMINAL PAIN: 1
DIARRHEA: 1
VOMITING: 0

## 2022-01-12 NOTE — ED TRIAGE NOTES
Pt presents to triage via [facility] from [facility] experiencing acute abdominal pain which began around 0300, accompanied by nausea. Pt states that he was diagnosed with Covid on 1/4 but is experiencing no other symptoms at this time.    Significant hx includes constipation and bowel obstructions.    4mg Zofran given en route.    Radha Ford RN on 1/12/2022 at 12:09 PM

## 2022-01-12 NOTE — H&P
[facility]    History and Physical - Hospitalist Service, Gold Night       Date of Admission:  1/12/2022    Assessment & Plan      Andrew Lockwood is a 54 year old male admitted on 1/12/2022. He has a past medical history significant for AIDS on Biktarvy, recurrent SBO, GERD and poorly controlled DM2 who presented to the ED with left sided abdominal pain with concern for SBO. He tested positive for COVID-19 on 1/4/2021 and is admitted for observation.    LUQ/LLQ pain abdominal pain  Hx of recurrent SBO  Possible SIBO  Noted acute onset abdominal pain early this morning with diarrhea day prior. Some nausea, denies vomiting. Pain worse with eating. Lipase negative. Alk Phos 156, remainder of LFTs wnl. No leukocytosis. Afebrile, vitals stable. C/f recurrent SBO. CT abd/pelvis with distended loop of jejunum with fecalization - potentially represents SIBO. No e/o of bowel obstruction. Abdomen TTP in LUQ/LLQ, no guarding. Offered clears overnight but prefers NPO.  - GI consulted for AM   - NPO  - LR @ 100 mL/hr  - Pain control   Oxycodone 5 mg q6h PRN   IV dilaudid 0.5 mg q6h PRN for severe breakthrough pain    Poorly controlled DM2 on insulin  Hgb A1c 11.9 in 12/2021. Reports poor control over BG due to options available at homeless shelters. PTA on insulin detemir 18u BID and novolog 10u TID with meals. Will hold home regimen while NPO and resume when able.  - BG checks q4h while NPO  - Decreased PTA insulin detemir to 9u BID until tolerating PO intake  - MSSI  - Hypoglycemia protocol    COVID-19 positive  [place] [facility] [place]. Denies shortness of breath, cough, fevers. Reports he has been fully vaccinated but has not received booster at this time.  - COVID precautions  - Hold on COVID directed therapies given asymptomatic and vitals stable    AIDS  Hx of CNS toxoplasmosis  PTA on Biktarvy. Most recent CD4 8/2021 was 519. Viral load <20.   - Continue PTA  Biktarvy    GERD - No acute issues. Continue PTA pantoprazole 40 mg         Diet: NPO for Medical/Clinical Reasons Except for: Ice Chips  DVT Prophylaxis: Pneumatic Compression Devices/Ambulate  Potts Catheter: Not present  Central Lines: None  Code Status: Full Code      Disposition Plan   Expected Discharge: 1-2 days  Anticipated discharge location:  Awaiting care coordination huddle       The patient's care was discussed with the Attending Physician, Dr. Bolaños and Patient.    BHAVANA [facility]  Securely message with the Vocera Web Console (learn more here)  Text page via [facility] Paging/Directory    Please see sign in/sign out for up to date coverage information    ______________________________________________________________________    Chief Complaint   Abdominal pain    History is obtained from the patient    History of Present Illness   Andrew Lockwood is a 54 year old male who presented to the ED with recurrent abdominal pain and concern for SBO.    He reports that with prior SBO he will typically have diarrhea the first day. He had diarrhea all day yesterday followed by acute onset abdominal pain around 2-3 AM this morning. Pain in pulsating in nature but does not radiate. Feels similar to prior SBO. This morning he was nauseated but this responded to zofran. Typically doesn't vomit with prior bowel obstructions. Passing gas the day prior to diarrhea starting, but now is not passing gas and no further BM. Last BM around 2:30-3AM today. Not eating due to pain as abdominal pain is worse with eating. Has not trialed fluids - had 2 popsicles here. Doesn't want to eat to due to fear of pain. He worries that his recurrent SBO is related to long acting insulin use.     In regards to DM management, he checks his BG 5-6 times daily. Notes 2 days ago he had a BG reading 545, yesterday had one that was 349. Struggles with food options that are available in the  shelter.     He was diagnosed with COVID on 1/4 at [facility] [place]. Has remained asymptomatic. No fevers. Has received 2 doses of vaccination but has not received booster yet.       Review of Systems    The 10 point Review of Systems is negative other than noted in the HPI or here.     Past Medical History    I have reviewed this patient's medical history and updated it with pertinent information if needed.   Past Medical History:   Diagnosis Date     AIDS (H)      Allergic rhinitis due to other allergen     DNS     Anal dysplasia      Chronic abdominal pain      CNS toxoplasmosis (H)      Diabetes type 2, controlled (H)      GERD (gastroesophageal reflux disease)      History of seizure      History of substance abuse (H)      HIV (human immunodeficiency virus infection) (H)      HLD (hyperlipidemia)      Lung nodules      Periungual wart      PTSD (post-traumatic stress disorder)      Sleep apnea     doesn't use CPAP       Past Surgical History   I have reviewed this patient's surgical history and updated it with pertinent information if needed.  Past Surgical History:   Procedure Laterality Date     ANOSCOPY N/A 9/9/2020    Procedure: Exam Under Anesthesia, ANOSCOPY, fulgeration of rectal fissures with Rectal Biopsies;  Surgeon: Thanh Lundberg MD;  Location: [facility] OR     COLONOSCOPY Left 1/22/2016    Procedure: COMBINED COLONOSCOPY, SINGLE OR MULTIPLE BIOPSY/POLYPECTOMY BY BIOPSY;  Surgeon: Clark Saini MD;  Location: [facility] GI     HC EXPLORE UNDESC TESTIS,INGUIN/SCROTAL       LAPAROSCOPIC APPENDECTOMY N/A 1/31/2018    Procedure: LAPAROSCOPIC APPENDECTOMY;  LAPAROSCOPIC APPENDECTOMY;  Surgeon: Dawn Holt MD;  Location: [facility] OR     LAPAROSCOPY DIAGNOSTIC (GENERAL) N/A 7/26/2016    Procedure: LAPAROSCOPY DIAGNOSTIC (GENERAL);  Surgeon: Susannah Arriaga MD;  Location: [facility] OR     LAPAROSCOPY DIAGNOSTIC (GENERAL) N/A 4/16/2018    Procedure: LAPAROSCOPY DIAGNOSTIC (GENERAL);  Diagnostic laparoscopy and  lysis of adhesions;  Surgeon: Prince Dowling MD;  Location: [facility] OR     OPTICAL TRACKING SYSTEM CRANIOTOMY, EXCISE TUMOR, COMBINED Left 4/10/2015    Procedure: COMBINED OPTICAL TRACKING SYSTEM CRANIOTOMY, EXCISE TUMOR;  Surgeon: Mirlande Colmenares MD;  Location: [facility] OR     REPAIR GAMEKEEPER'S THUMB Right 12/2/2016    Procedure: REPAIR LIGAMENT ULNAR COLLATERAL THUMB (GAMEKEEPER'S);  Surgeon: Evin Zamorano MD;  Location:  OR     [facility] NONSPECIFIC PROCEDURE      right forearm fracture       Social History   I have reviewed this patient's social history and updated it with pertinent information if needed.  Social History     Tobacco Use     Smoking status: Current Every Day Smoker     Packs/day: 0.50     Years: 40.00     Pack years: 20.00     Types: Cigarettes     Smokeless tobacco: Former User   Substance Use Topics     Alcohol use: No     Alcohol/week: 0.0 standard drinks     Comment: Last etoh in 2007     Drug use: Not Currently     Types: Marijuana, Methamphetamines     Comment: Sober 9 months . pinsist never did drugs       Family History   I have reviewed this patient's family history and updated it with pertinent information if needed.  Family History   Problem Relation Age of Onset     Diabetes Brother      Diabetes Father      Alzheimer Disease Father      Unknown/Adopted Mother      Diabetes Paternal Grandfather      Cancer No family hx of         no skin cancer     Skin Cancer No family hx of         no famiy hx of skin cancer     Glaucoma No family hx of      Macular Degeneration No family hx of        Prior to Admission Medications   Prior to Admission Medications   Prescriptions Last Dose Informant Patient Reported? Taking?   Alcohol Swabs PADS  Self No No   Sig: Use to swab the area of the injection or todd as directed Per insurance coverage   NOVOLOG FLEXPEN 100 UNIT/ML soln  Self No No   Sig: Inject 10 Units Subcutaneous 3 times daily (with meals)   SENNA-docusate sodium (SENNA S) 8.6-50  "MG tablet  Self No No   Sig: Take 1 tablet by mouth 2 times daily as needed (constipation)   STATIN NOT PRESCRIBED (INTENTIONAL)  Self No No   Sig: Please choose reason not prescribed, below   Sharps Container MISC  Self No No   Sig: Use as directed to dispose of needles, lancets and other sharps. Per Insurance coverage   bictegravir-emtricitabine-tenofovir (BIKTARVY) -25 MG per tablet  Self No No   Sig: Take 1 tablet by mouth daily   blood glucose (NO BRAND SPECIFIED) test strip  Self No No   Sig: Use to test blood sugar 4 times daily or as directed. To accompany:whatever is covered   blood glucose calibration (NO BRAND SPECIFIED) solution  Self No No   Sig: Used to calibrate the blood glucose monitor as needed and as directed.  To accompany  blood glucose brands per insurance coverage   blood glucose monitoring (NO BRAND SPECIFIED) meter device kit  Self No No   Sig: Use as directed. Per insurance coverage.   glucose 40 % (400 mg/mL) gel  Self No No   Sig: 15 g every 15 minutes by mouth as needed for low blood sugar. Oral gel is preferable for conscious and able to swallow patient.   insulin detemir (LEVEMIR PEN) 100 UNIT/ML pen  Self No No   Sig: Inject 18 Units Subcutaneous 2 times daily   insulin pen needle (BD SCOTT U/F) 32G X 4 MM miscellaneous  Self No No   Sig: USE 3 TO 4 NEEDLES DAILY   pantoprazole (PROTONIX) 40 MG EC tablet  Self No No   Sig: Take 1 tablet (40 mg) by mouth daily   polyethylene glycol (MIRALAX) 17 GM/Dose powder  Self No No   Sig: Take 17 g by mouth daily as needed   thin (NO BRAND SPECIFIED) lancets  Self No No   Sig: Use with lanceting device. To accompany: Test 4 times daily with whatever is covered      Facility-Administered Medications: None     Allergies   Allergies   Allergen Reactions     Metformin      Abdominal pain     Tylenol [Acetaminophen] Itching     Dulaglutide Rash     Penicillin V Other (See Comments) and Rash     Diffuse maculopapular rash + feels \"high\", per pt.  "       Physical Exam   Vital Signs: Temp: 98  F (36.7  C) Temp src: Oral BP: 119/86 Pulse: 111   Resp: 18 SpO2: 98 % O2 Device: None (Room air)    Weight: 0 lbs 0 oz    General Appearance: Awake. Alert and oriented x 4. No acute distress. Laying in hospital stretcher.  Eyes: Normal lids. Anicteric sclera. PERRL.   HEENT: Normocephalic, atraumatic. Nares patent. Oropharynx clear. Mucous membranes moist.  Respiratory: Normal work of breathing on room air. Lungs CTAB. No wheezes, rhonchi or rales.  Cardiovascular: RRR. S1,S2. No murmurs, rubs or gallops. Pedal pulses 2+ No lower extremity edema.  GI: Abdomen non-distended. Normoactive bowel sounds. Soft, tenderness to palpation primarily in LLQ, as well as LUQ. Remainder abdomen nontender. No guarding.   Lymph/Hematologic: No abnormal or excessive bruising.  Skin: Warm, dry. No rashes on exposed skin.  Musculoskeletal: Moves all extremities equally.  Neurologic: No focal deficits. CN II-XII grossly intact.    Data   Data reviewed today: I reviewed all medications, new labs and imaging results over the last 24 hours. I personally reviewed no images or EKG's today.    Recent Labs   Lab 01/12/22  1217   WBC 10.5   HGB 14.6   MCV 83   *      POTASSIUM 4.1   CHLORIDE 101   CO2 25   BUN 15   CR 0.67   ANIONGAP 7   LINDA 10.2*   *   ALBUMIN 3.6   PROTTOTAL 8.0   BILITOTAL 0.5   ALKPHOS 156*   ALT 20   AST 8   LIPASE 295     Recent Results (from the past 24 hour(s))   CT Abdomen Pelvis w Contrast    Narrative    EXAMINATION: CT ABDOMEN PELVIS W CONTRAST  1/12/2022 2:40 PM      CLINICAL HISTORY: Bowel obstruction suspected    COMPARISON: CT 12/24/2021    PROCEDURE COMMENTS: CT of the abdomen was performed with iopamidol  (ISOVUE-370) solution 97 mL intravenous contrast. Coronal and sagittal  reformatted images were obtained.    FINDINGS:  Lower thorax:   Small amount of right basilar atelectasis.     Abdomen and pelvis:  Focal fatty deposition near the  falciform ligament. A few tiny  hypoattenuating subcentimeter foci are present throughout the liver,  too small to characterize but likely simple hepatic cysts. No  intrahepatic biliary ductal dilation. 4 mm stone within the  gallbladder neck (series 3, image 123). Common bile duct is  nondilated. Pancreas is unremarkable. Spleen is unremarkable. Adrenal  glands are within normal limits. No focal renal lesion. No  hydronephrosis or hydroureter. Bladder is partially distended and  unremarkable. Reservoir within the pelvis for penile pump. Prominent  size of the prostate.    One loop of small bowel in the left mid abdomen measures up to 4 cm in  diameter and demonstrates borderline wall thickening and fecalization.  No inflammatory changes within the mesentery surrounding this loop of  bowel. Postoperative changes from appendectomy.    There is no free air or free fluid. There are no abnormally sized  lymph nodes.    Osseous structures:   Mild degenerative changes without acute osseous abnormalities.      Impression    IMPRESSION:  1. Distended loop of jejunum with fecalization. This loop of bowel is  dilated and has questionable wall thickening. Given history of  HIV-AIDS this finding could potentially be related to small intestinal  bacterial overgrowth which can be seen in HIV related autonomic  neuropathies. Other loops of bowel are unremarkable. No evidence for  bowel obstruction.  2. Cholelithiasis within the gallbladder neck. No evidence for acute  cholecystitis or biliary obstruction.  3. Prostatomegaly similar to prior exam.    I have personally reviewed the examination and initial interpretation  and I agree with the findings.    JANIE VANN MD         SYSTEM ID:  [id number]

## 2022-01-12 NOTE — ED PROVIDER NOTES
[place] EMERGENCY DEPARTMENT ([facility])  1/12/22   ED 25    History     Chief Complaint   Patient presents with     Abdominal Pain     The history is provided by the patient and medical records.     Andrew Lockwodo is a 54 year old male with complex past medical history including AIDS, recurrent SBO, GERD, poorly controlled type II diabetes mellitus who presents with left upper quadrant abdominal pain. He tested positive for COVID-19 on 1/4/21 through [employer].  He states that yesterday afternoon he developed abdominal pain and diarrhea.   He was advised to come to the emergency department for further evaluation and they called 911 for him.  Here patient states that this abdominal pain is similar to the recurrent SBO flares and abdominal pain that he has had for the past 3 years.  The pain is non-radiating. He states this feels similar to prior SBO. He states that he comes to the Emergency Department every few weeks for this pain. He typically gets diarrhea the first day followed by constipation.  Nothing makes it better or worse. No black or bloody stools. No nausea, vomiting. No shortness of breath or urinary symptoms. No COVID-19 vaccination documented.  Patient reports last stool was several hours ago.    Per [employer] records, patient's last absolute CD4 count was on 8/24/2021, 519.  Viral load <20.     I have reviewed the Medications, Allergies, Past Medical and Surgical History, and Social History in the [employer] system.  PAST MEDICAL HISTORY:   Past Medical History:   Diagnosis Date     AIDS (H)      Allergic rhinitis due to other allergen     DNS     Anal dysplasia      Chronic abdominal pain      CNS toxoplasmosis (H)      Diabetes type 2, controlled (H)      GERD (gastroesophageal reflux disease)      History of seizure      History of substance abuse (H)      HIV (human immunodeficiency virus infection) (H)      HLD (hyperlipidemia)      Lung nodules      Periungual wart      PTSD  (post-traumatic stress disorder)      Sleep apnea     doesn't use CPAP       PAST SURGICAL HISTORY:   Past Surgical History:   Procedure Laterality Date     ANOSCOPY N/A 9/9/2020    Procedure: Exam Under Anesthesia, ANOSCOPY, fulgeration of rectal fissures with Rectal Biopsies;  Surgeon: Thanh Lundberg MD;  Location: [facility] OR     COLONOSCOPY Left 1/22/2016    Procedure: COMBINED COLONOSCOPY, SINGLE OR MULTIPLE BIOPSY/POLYPECTOMY BY BIOPSY;  Surgeon: Clark Saini MD;  Location: [facility] GI     HC EXPLORE UNDESC TESTIS,INGUIN/SCROTAL       LAPAROSCOPIC APPENDECTOMY N/A 1/31/2018    Procedure: LAPAROSCOPIC APPENDECTOMY;  LAPAROSCOPIC APPENDECTOMY;  Surgeon: Dawn Holt MD;  Location: [facility] OR     LAPAROSCOPY DIAGNOSTIC (GENERAL) N/A 7/26/2016    Procedure: LAPAROSCOPY DIAGNOSTIC (GENERAL);  Surgeon: Susannah Arriaga MD;  Location: [facility] OR     LAPAROSCOPY DIAGNOSTIC (GENERAL) N/A 4/16/2018    Procedure: LAPAROSCOPY DIAGNOSTIC (GENERAL);  Diagnostic laparoscopy and lysis of adhesions;  Surgeon: Prince Dowling MD;  Location: [facility] OR     OPTICAL TRACKING SYSTEM CRANIOTOMY, EXCISE TUMOR, COMBINED Left 4/10/2015    Procedure: COMBINED OPTICAL TRACKING SYSTEM CRANIOTOMY, EXCISE TUMOR;  Surgeon: Mirlande Colmenares MD;  Location: [facility] OR     REPAIR GAMEKEEPER'S THUMB Right 12/2/2016    Procedure: REPAIR LIGAMENT ULNAR COLLATERAL THUMB (GAMEKEEPER'S);  Surgeon: Evin Zamorano MD;  Location: [facility] OR     ZZC NONSPECIFIC PROCEDURE      right forearm fracture       Past medical history, past surgical history, medications, and allergies were reviewed with the patient. Additional pertinent items: None    FAMILY HISTORY:   Family History   Problem Relation Age of Onset     Diabetes Brother      Diabetes Father      Alzheimer Disease Father      Unknown/Adopted Mother      Diabetes Paternal Grandfather      Cancer No family hx of         no skin cancer     Skin Cancer No family hx of         no famiy hx of skin  cancer     Glaucoma No family hx of      Macular Degeneration No family hx of        SOCIAL HISTORY:   Social History     Tobacco Use     Smoking status: Current Every Day Smoker     Packs/day: 0.50     Years: 40.00     Pack years: 20.00     Types: Cigarettes     Smokeless tobacco: Former User   Substance Use Topics     Alcohol use: No     Alcohol/week: 0.0 standard drinks     Comment: Last etoh in 2007     Social history was reviewed with the patient. Additional pertinent items: None      Patient's Medications   New Prescriptions    No medications on file   Previous Medications    ALCOHOL SWABS PADS    Use to swab the area of the injection or todd as directed Per insurance coverage    BICTEGRAVIR-EMTRICITABINE-TENOFOVIR (BIKTARVY) -25 MG PER TABLET    Take 1 tablet by mouth daily    BLOOD GLUCOSE (NO BRAND SPECIFIED) TEST STRIP    Use to test blood sugar 4 times daily or as directed. To accompany:whatever is covered    BLOOD GLUCOSE CALIBRATION (NO BRAND SPECIFIED) SOLUTION    Used to calibrate the blood glucose monitor as needed and as directed.  To accompany  blood glucose brands per insurance coverage    BLOOD GLUCOSE MONITORING (NO BRAND SPECIFIED) METER DEVICE KIT    Use as directed. Per insurance coverage.    GLUCOSE 40 % (400 MG/ML) GEL    15 g every 15 minutes by mouth as needed for low blood sugar. Oral gel is preferable for conscious and able to swallow patient.    INSULIN DETEMIR (LEVEMIR PEN) 100 UNIT/ML PEN    Inject 18 Units Subcutaneous 2 times daily    INSULIN PEN NEEDLE (BD SCOTT U/F) 32G X 4 MM MISCELLANEOUS    USE 3 TO 4 NEEDLES DAILY    NOVOLOG FLEXPEN 100 UNIT/ML SOLN    Inject 10 Units Subcutaneous 3 times daily (with meals)    PANTOPRAZOLE (PROTONIX) 40 MG EC TABLET    Take 1 tablet (40 mg) by mouth daily    POLYETHYLENE GLYCOL (MIRALAX) 17 GM/DOSE POWDER    Take 17 g by mouth daily as needed    SENNA-DOCUSATE SODIUM (SENNA S) 8.6-50 MG TABLET    Take 1 tablet by mouth 2 times daily as  "needed (constipation)    SHARPS CONTAINER MISC    Use as directed to dispose of needles, lancets and other sharps. Per Insurance coverage    STATIN NOT PRESCRIBED (INTENTIONAL)    Please choose reason not prescribed, below    THIN (NO BRAND SPECIFIED) LANCETS    Use with lanceting device. To accompany: Test 4 times daily with whatever is covered   Modified Medications    No medications on file   Discontinued Medications    No medications on file          Allergies   Allergen Reactions     Metformin      Abdominal pain     Tylenol [Acetaminophen] Itching     Dulaglutide Rash     Penicillin V Other (See Comments) and Rash     Diffuse maculopapular rash + feels \"high\", per pt.         Review of Systems   Gastrointestinal: Positive for abdominal pain and diarrhea. Negative for nausea and vomiting.   All other systems reviewed and are negative.    A complete review of systems was performed with pertinent positives and negatives noted in the HPI, and all other systems negative.    Physical Exam   BP: 119/86  Pulse: 111  Temp: 98  F (36.7  C)  Resp: 18  SpO2: 98 %      General: awake, alert, NAD  Head: normal cephalic  HEENT: pupils equal, conjugate gaze intact  Neck: Supple  CV: regular rate and rhythm without murmur  Lungs: clear to auscultation  Abd: soft, patient with epigastric and left upper quadrant tenderness, no peritoneal signs  EXT: lower extremities without swelling or edema  Neuro: awake, answers questions appropriately. No focal deficits noted       ED Course     Medications   0.9% sodium chloride BOLUS (0 mLs Intravenous Stopped 1/12/22 1601)     Followed by   sodium chloride 0.9% infusion ( Intravenous Not Given 1/12/22 1628)   HYDROmorphone (PF) (DILAUDID) injection 0.5 mg (0.5 mg Intravenous Given 1/12/22 1353)   iopamidol (ISOVUE-370) solution 97 mL (97 mLs Intravenous Given 1/12/22 1430)   sodium chloride (PF) 0.9% PF flush 74 mL (74 mLs Intracatheter Given 1/12/22 1430)   HYDROmorphone (PF) (DILAUDID) " injection 0.5 mg (0.5 mg Intravenous Given 1/12/22 1627)     Results for orders placed or performed during the hospital encounter of 01/12/22   CT Abdomen Pelvis w Contrast     Status: None    Narrative    EXAMINATION: CT ABDOMEN PELVIS W CONTRAST  1/12/2022 2:40 PM      CLINICAL HISTORY: Bowel obstruction suspected    COMPARISON: CT 12/24/2021    PROCEDURE COMMENTS: CT of the abdomen was performed with iopamidol  (ISOVUE-370) solution 97 mL intravenous contrast. Coronal and sagittal  reformatted images were obtained.    FINDINGS:  Lower thorax:   Small amount of right basilar atelectasis.     Abdomen and pelvis:  Focal fatty deposition near the falciform ligament. A few tiny  hypoattenuating subcentimeter foci are present throughout the liver,  too small to characterize but likely simple hepatic cysts. No  intrahepatic biliary ductal dilation. 4 mm stone within the  gallbladder neck (series 3, image 123). Common bile duct is  nondilated. Pancreas is unremarkable. Spleen is unremarkable. Adrenal  glands are within normal limits. No focal renal lesion. No  hydronephrosis or hydroureter. Bladder is partially distended and  unremarkable. Reservoir within the pelvis for penile pump. Prominent  size of the prostate.    One loop of small bowel in the left mid abdomen measures up to 4 cm in  diameter and demonstrates borderline wall thickening and fecalization.  No inflammatory changes within the mesentery surrounding this loop of  bowel. Postoperative changes from appendectomy.    There is no free air or free fluid. There are no abnormally sized  lymph nodes.    Osseous structures:   Mild degenerative changes without acute osseous abnormalities.      Impression    IMPRESSION:  1. Distended loop of jejunum with fecalization. This loop of bowel is  dilated and has questionable wall thickening. Given history of  HIV-AIDS this finding could potentially be related to small intestinal  bacterial overgrowth which can be seen in  HIV related autonomic  neuropathies. Other loops of bowel are unremarkable. No evidence for  bowel obstruction.  2. Cholelithiasis within the gallbladder neck. No evidence for acute  cholecystitis or biliary obstruction.  3. Prostatomegaly similar to prior exam.    I have personally reviewed the examination and initial interpretation  and I agree with the findings.    JANIE VANN MD         SYSTEM ID:  [id number]   Comprehensive metabolic panel     Status: Abnormal   Result Value Ref Range    Sodium 133 133 - 144 mmol/L    Potassium 4.1 3.4 - 5.3 mmol/L    Chloride 101 94 - 109 mmol/L    Carbon Dioxide (CO2) 25 20 - 32 mmol/L    Anion Gap 7 3 - 14 mmol/L    Urea Nitrogen 15 7 - 30 mg/dL    Creatinine 0.67 0.66 - 1.25 mg/dL    Calcium 10.2 (H) 8.5 - 10.1 mg/dL    Glucose 362 (H) 70 - 99 mg/dL    Alkaline Phosphatase 156 (H) 40 - 150 U/L    AST 8 0 - 45 U/L    ALT 20 0 - 70 U/L    Protein Total 8.0 6.8 - 8.8 g/dL    Albumin 3.6 3.4 - 5.0 g/dL    Bilirubin Total 0.5 0.2 - 1.3 mg/dL    GFR Estimate >90 >60 mL/min/1.73m2   Extra Blue Top Tube     Status: None   Result Value Ref Range    Hold Specimen JIC    Extra Red Top Tube     Status: None   Result Value Ref Range    Hold Specimen JIC    Extra Green Top (Lithium Heparin) Tube     Status: None   Result Value Ref Range    Hold Specimen JIC    Extra Purple Top Tube     Status: None   Result Value Ref Range    Hold Specimen JIC    Lipase     Status: Normal   Result Value Ref Range    Lipase 295 73 - 393 U/L   Lactic acid whole blood     Status: Normal   Result Value Ref Range    Lactic Acid 1.2 0.7 - 2.0 mmol/L   CBC with platelets and differential     Status: Abnormal   Result Value Ref Range    WBC Count 10.5 4.0 - 11.0 10e3/uL    RBC Count 5.24 4.40 - 5.90 10e6/uL    Hemoglobin 14.6 13.3 - 17.7 g/dL    Hematocrit 43.4 40.0 - 53.0 %    MCV 83 78 - 100 fL    MCH 27.9 26.5 - 33.0 pg    MCHC 33.6 31.5 - 36.5 g/dL    RDW 13.4 10.0 - 15.0 %    Platelet Count 476 (H) 150 -  450 10e3/uL    % Neutrophils 67 %    % Lymphocytes 25 %    % Monocytes 4 %    % Eosinophils 2 %    % Basophils 1 %    % Immature Granulocytes 1 %    NRBCs per 100 WBC 0 <1 /100    Absolute Neutrophils 7.1 1.6 - 8.3 10e3/uL    Absolute Lymphocytes 2.6 0.8 - 5.3 10e3/uL    Absolute Monocytes 0.5 0.0 - 1.3 10e3/uL    Absolute Eosinophils 0.2 0.0 - 0.7 10e3/uL    Absolute Basophils 0.1 0.0 - 0.2 10e3/uL    Absolute Immature Granulocytes 0.1 <=0.4 10e3/uL    Absolute NRBCs 0.0 10e3/uL   [place] Draw     Status: None    Narrative    The following orders were created for panel order [place] Draw.  Procedure                               Abnormality         Status                     ---------                               -----------         ------                     Extra Blue Top Tube[[id number]]                              Final result               Extra Red Top Tube[[id number]]                               Final result               Extra Green Top (Lithium...[[id number]]                      Final result               Extra Purple Top Tube[[id number]]                            Final result                 Please view results for these tests on the individual orders.   CBC with platelets differential     Status: Abnormal    Narrative    The following orders were created for panel order CBC with platelets differential.  Procedure                               Abnormality         Status                     ---------                               -----------         ------                     CBC with platelets and d...[[id number]]  Abnormal            Final result                 Please view results for these tests on the individual orders.          Procedures           The medical record was reviewed and interpreted.  Current labs reviewed and interpreted.  Previous labs reviewed and interpreted.     Assessments & Plan (with Medical Decision Making)     Andrew Lockwood is a 54 year old male with past medical history  significant for AIDS and recurrent SBO who presents with left upper quadrant pain which he reports is consistent with his usual SBO symptoms.  On initial exam he is mildly tachycardic, nontoxic, quite tender in his left upper quadrant without guarding or peritoneal signs.  Initial suspicion for him is recurrent SBO given his history, would also consider things such as electrolyte abnormality, pancreatitis, some other type of bowel pathology such as diverticulitis, given left upper quadrant would also consider potential for renal colic though less likely as patient states this does not radiate to the back.  Initial evaluation will include labs, IV fluids, pain control, and imaging.    Labs notable for normal electrolytes, normal white count, no left shift, normal lactic acid.  Patient has a normal lipase.  LFTs and bilirubin are unremarkable.    CT scan was obtained; there was no small bowel obstruction noted.  There was a loop of bowel that was dilated with questionable wall thickening which could be secondary to intestinal bacterial overgrowth.  Patient was having ongoing pain and required IV pain medications for pain control.  Will admit to medicine obs for pain control and GI consultation.    Patient required several doses of IV Dilaudid while in the emergency department for pain control.    This part of the document was transcribed by Yadira Boss, Medical Scribe.      I have reviewed the nursing notes.    I have reviewed the findings, diagnosis, plan and need for follow up with the patient.    New Prescriptions    No medications on file       Final diagnoses:   Abdominal pain, left upper quadrant     IYadira, am serving as a trained medical scribe to document services personally performed by Parish Roberts MD based on the provider's statements to me on January 12, 2022.  This document has been checked and approved by the attending provider.    I, Parish Roberts MD, was physically present and have  reviewed and verified the accuracy of this note documented by Yadira Boss, medical scribe.      Parish Gleason MD     1/12/2022   [facility] EMERGENCY DEPARTMENT     Parish Gleason MD  01/12/22 [id number]

## 2022-01-13 VITALS
DIASTOLIC BLOOD PRESSURE: 75 MMHG | OXYGEN SATURATION: 100 % | SYSTOLIC BLOOD PRESSURE: 117 MMHG | HEART RATE: 76 BPM | RESPIRATION RATE: 18 BRPM | TEMPERATURE: 98.5 F

## 2022-01-13 LAB
ALBUMIN SERPL-MCNC: 3 G/DL (ref 3.4–5)
ALP SERPL-CCNC: 102 U/L (ref 40–150)
ALT SERPL W P-5'-P-CCNC: 16 U/L (ref 0–70)
ANION GAP SERPL CALCULATED.3IONS-SCNC: 5 MMOL/L (ref 3–14)
AST SERPL W P-5'-P-CCNC: 7 U/L (ref 0–45)
BILIRUB SERPL-MCNC: 0.6 MG/DL (ref 0.2–1.3)
BUN SERPL-MCNC: 12 MG/DL (ref 7–30)
CALCIUM SERPL-MCNC: 9 MG/DL (ref 8.5–10.1)
CHLORIDE BLD-SCNC: 102 MMOL/L (ref 94–109)
CO2 SERPL-SCNC: 28 MMOL/L (ref 20–32)
CREAT SERPL-MCNC: 0.65 MG/DL (ref 0.66–1.25)
ERYTHROCYTE [DISTWIDTH] IN BLOOD BY AUTOMATED COUNT: 13.7 % (ref 10–15)
GFR SERPL CREATININE-BSD FRML MDRD: >90 ML/MIN/1.73M2
GLUCOSE BLD-MCNC: 172 MG/DL (ref 70–99)
HCT VFR BLD AUTO: 40.3 % (ref 40–53)
HGB BLD-MCNC: 13.3 G/DL (ref 13.3–17.7)
MCH RBC QN AUTO: 27.7 PG (ref 26.5–33)
MCHC RBC AUTO-ENTMCNC: 33 G/DL (ref 31.5–36.5)
MCV RBC AUTO: 84 FL (ref 78–100)
PLATELET # BLD AUTO: 358 10E3/UL (ref 150–450)
POTASSIUM BLD-SCNC: 3.7 MMOL/L (ref 3.4–5.3)
PROT SERPL-MCNC: 6.6 G/DL (ref 6.8–8.8)
RBC # BLD AUTO: 4.81 10E6/UL (ref 4.4–5.9)
SODIUM SERPL-SCNC: 135 MMOL/L (ref 133–144)
WBC # BLD AUTO: 7.7 10E3/UL (ref 4–11)

## 2022-01-13 PROCEDURE — 250N000011 HC RX IP 250 OP 636: Performed by: PHYSICIAN ASSISTANT

## 2022-01-13 PROCEDURE — G0378 HOSPITAL OBSERVATION PER HR: HCPCS

## 2022-01-13 PROCEDURE — 80053 COMPREHEN METABOLIC PANEL: CPT | Performed by: PHYSICIAN ASSISTANT

## 2022-01-13 PROCEDURE — 99226 PR SUBSEQUENT OBSERVATION CARE,LEVEL III: CPT | Performed by: STUDENT IN AN ORGANIZED HEALTH CARE EDUCATION/TRAINING PROGRAM

## 2022-01-13 PROCEDURE — 99207 PR CDG-CODE CATEGORY CHANGED: CPT | Performed by: STUDENT IN AN ORGANIZED HEALTH CARE EDUCATION/TRAINING PROGRAM

## 2022-01-13 PROCEDURE — 258N000003 HC RX IP 258 OP 636: Performed by: PHYSICIAN ASSISTANT

## 2022-01-13 PROCEDURE — 82040 ASSAY OF SERUM ALBUMIN: CPT | Performed by: PHYSICIAN ASSISTANT

## 2022-01-13 PROCEDURE — 250N000013 HC RX MED GY IP 250 OP 250 PS 637: Performed by: PHYSICIAN ASSISTANT

## 2022-01-13 PROCEDURE — 99214 OFFICE O/P EST MOD 30 MIN: CPT | Performed by: NURSE PRACTITIONER

## 2022-01-13 PROCEDURE — 96376 TX/PRO/DX INJ SAME DRUG ADON: CPT | Performed by: EMERGENCY MEDICINE

## 2022-01-13 PROCEDURE — 250N000012 HC RX MED GY IP 250 OP 636 PS 637: Performed by: PHYSICIAN ASSISTANT

## 2022-01-13 PROCEDURE — 250N000013 HC RX MED GY IP 250 OP 250 PS 637: Performed by: STUDENT IN AN ORGANIZED HEALTH CARE EDUCATION/TRAINING PROGRAM

## 2022-01-13 PROCEDURE — 96372 THER/PROPH/DIAG INJ SC/IM: CPT | Performed by: PHYSICIAN ASSISTANT

## 2022-01-13 PROCEDURE — 85027 COMPLETE CBC AUTOMATED: CPT | Performed by: PHYSICIAN ASSISTANT

## 2022-01-13 PROCEDURE — 82962 GLUCOSE BLOOD TEST: CPT

## 2022-01-13 PROCEDURE — 36415 COLL VENOUS BLD VENIPUNCTURE: CPT | Performed by: PHYSICIAN ASSISTANT

## 2022-01-13 RX ORDER — POLYETHYLENE GLYCOL 3350 17 G/17G
17 POWDER, FOR SOLUTION ORAL 2 TIMES DAILY
Status: DISCONTINUED | OUTPATIENT
Start: 2022-01-13 | End: 2022-01-14 | Stop reason: HOSPADM

## 2022-01-13 RX ADMIN — METHYLCELLULOSE 500 MG: 500 TABLET ORAL at 15:36

## 2022-01-13 RX ADMIN — SODIUM CHLORIDE, POTASSIUM CHLORIDE, SODIUM LACTATE AND CALCIUM CHLORIDE: 600; 310; 30; 20 INJECTION, SOLUTION INTRAVENOUS at 09:52

## 2022-01-13 RX ADMIN — INSULIN ASPART 4 UNITS: 100 INJECTION, SOLUTION INTRAVENOUS; SUBCUTANEOUS at 15:42

## 2022-01-13 RX ADMIN — PANTOPRAZOLE SODIUM 40 MG: 40 TABLET, DELAYED RELEASE ORAL at 09:41

## 2022-01-13 RX ADMIN — HYDROMORPHONE HYDROCHLORIDE 0.5 MG: 1 INJECTION, SOLUTION INTRAMUSCULAR; INTRAVENOUS; SUBCUTANEOUS at 09:51

## 2022-01-13 RX ADMIN — SODIUM CHLORIDE, POTASSIUM CHLORIDE, SODIUM LACTATE AND CALCIUM CHLORIDE: 600; 310; 30; 20 INJECTION, SOLUTION INTRAVENOUS at 15:36

## 2022-01-13 RX ADMIN — INSULIN ASPART 5 UNITS: 100 INJECTION, SOLUTION INTRAVENOUS; SUBCUTANEOUS at 17:51

## 2022-01-13 RX ADMIN — BICTEGRAVIR SODIUM, EMTRICITABINE, AND TENOFOVIR ALAFENAMIDE FUMARATE 1 TABLET: 50; 200; 25 TABLET ORAL at 09:43

## 2022-01-13 RX ADMIN — HYDROMORPHONE HYDROCHLORIDE 0.5 MG: 1 INJECTION, SOLUTION INTRAMUSCULAR; INTRAVENOUS; SUBCUTANEOUS at 00:11

## 2022-01-13 NOTE — PROGRESS NOTES
"[facility]    Medicine Progress Note - Hospitalist Service, Gold 12       Date of Admission:  1/12/2022    Assessment & Plan          Andrew Lockwood is a 54 year old male admitted on 1/12/2022. He has a past medical history significant for AIDS on Biktarvy, recurrent SBO, GERD and poorly controlled DM2 who presented to the ED with left sided abdominal pain with concern for SBO. He tested positive for COVID-19 on 1/4/2021 and is admitted for observation.       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  Hungry.  ADAT.   - LR @ 100 mL/hr, to end tonight.   - Pain control              Oxycodone 5 mg q6h PRN  - Per GI, schedule bowel regimen.  Will also add fiber as patient says this helps with BMs.    - Per GI, can treat with cipro for 14 days 500 BID for empiric treatment of SIBO if this occurs again.   If this occurs again despite both, consider surgery consult for diagnostic lap yelitza again (had it in 2018).        Poorly controlled DM2 on insulin  Hgb A1c 11.9 in 12/2021. Reports poor control over BG due to options available at homeless shelters. PTA on insulin detemir 18u BID and novolog 10u TID with meals. Will hold home regimen while NPO and resume when able.  - BG checks q4h while NPO  - Decreased PTA insulin detemir to 9u BID until tolerating PO intake  - MSSI  - Hypoglycemia protocol     COVID-19 positive, 1/4/2022  - Asymptomatic  - COVID precautions until 1/24/2022 given patient is \"mildly immunocompromised\" with AIDS and CD4 > 200.      AIDS, CD4 > 500  Hx of CNS toxoplasmosis  PTA on Biktarvy. Most recent CD4 8/2021 was 519. Viral load <20.   - Continue PTA Biktarvy     GERD - No acute issues. Continue PTA pantoprazole 40 mg            Diet: Regular Diet Adult    DVT Prophylaxis: Pneumatic Compression Devices  Potts Catheter: Not present  Central Lines: " None  Code Status: Full Code      Disposition Plan   Expected Discharge: 01/14/2022   Anticipated discharge location: Homeless       The patient's care was discussed with the Bedside Nurse and Patient.    Henry Coombs MD  Hospitalist Service, [address]  Securely message with the Vocera Web Console (learn more here)  Text page via [employer] Paging/Directory    Please see sign in/sign out for up to date coverage information    Clinically Significant Risk Factors Present on Admission                # Overweight: last There is no height or weight on file to calculate BMI.      ______________________________________________________________________    Interval History   Passing gas, had a small BM this morning.  Tolerating clears.  Wants to eat.   Denies chest pain or SOB.     Data reviewed today: I reviewed all medications, new labs and imaging results over the last 24 hours. I personally reviewed.    Physical Exam   Vital Signs: Temp: 98.5  F (36.9  C) Temp src: Oral BP: 117/75 Pulse: 76   Resp: 18 SpO2: 100 % O2 Device: None (Room air)    Weight: 0 lbs 0 oz  General Appearance: NAD  Respiratory: on RA  Cardiovascular:   GI: Soft, mildly tender in LLQ, non-distended, no guarding/rebound  Skin: No rash on exposed areas  Other: Alert and oriented

## 2022-01-13 NOTE — CONSULTS
GASTROENTEROLOGY CONSULTATION      Date of Admission:  1/12/2022          ASSESSMENT AND RECOMMENDATIONS:   Assessment:  Andrew Lockwood is a 54 year old male admitted on 1/12/2022. He has a past medical history significant for AIDS on Biktarvy, HSV + anal biopsies, recurrent SBO, history of substance abuse, GERD, HLD, diagnostic laparoscopy x 2 (2016, 2018), lap appendectomy c/b adhesions (2018) and poorly controlled DM2 who presented to the ED Covid + with recurrent partial SBO. GI consulted with question of possible SIBO in setting of AIDs.    Recurrent SBO (~mid jejunum)   AIDs, CD4 > 500  Non-toxic, CT with recurrent jejunal fecalization/obstruction and dilation without transition point. Symptoms and CT findings previously evaluated via push enteroscopy/colonoscopy, pathology and special staining WNL. Most plausibly his chronic obstructive episodes are 2/2 to a past HIV related enteritis (histoplasmosis or otherwise) that has resolved and left him with multiple segment of SB stricturing. Surgery did not find extensive adhesions of dx laparoscopy in 2018. MR enterography in 2019 similarly did not show active enteritis. No evidence to suggest lymphoma, infection, Whipple's or extremely poor motility as culprit. Would favor conservative management with bowel regimen (as he likely has multiple strictures which may make a laparoscopic resection more challenging/less effective. Could also trial treatment for SIBO. Low suspicion this will prevent obstructive episodes, but reasonable to try given frequency of episodes.      Recommendations  -CLD, if no BM this am start 4 L Golytely, ADAT once he has bm  -Following resumption of BM, start BID Miralax (ensure he has outpatient rx)  -Could consider empiric trial of 500 mg Ciprofloxacin BID for 14 days for possible SIBO component  -No need for further imaging or endoscopic workup from GI standpoint  -With recurrent obstructions on BID Miralax consider further surgical  evaluation    GI will signoff    Thank you for involving us in this patient's care. Please do not hesitate to contact the GI service with any questions or concerns.     Pt care plan discussed with Dr. Santana, GI staff physician.    VIKASH Berry CNP  Text page  -------------------------------------------------------------------------------------------------------------------          Chief Complaint:   We were asked by Dr Coombs of medicine to evaluate this patient with Partial SBO, small ball fecalization in setting of HIV    History is obtained from the patient and the medical record.          History of Present Illness:   Andrew Lockwood is a 54 year old male admitted on 1/12/2022. He has a past medical history significant for AIDS on Biktarvy, recurrent SBO, history of substance abuse, GERD, HLD, diagnostic laparoscopy x 2 (2016, 2018), lap appendectomy c/b adhesions (2018) and poorly controlled DM2 who presented to the ED with recurrent partial SBO. GI consulted with question of possible SIBO in setting of AIDs.    This is the 12th time he has sought care in [facility] ED in past twelve months for abdominal pain/constipation concerning for SBO, most recently here 12/24/201. Surgery offered laparoscopic investigation, which he declined. BM resumed with conservative measures.     Today he reports last bm 1/9 with increasing LLQ pain. Last few stools were loose, similar to what he previously experienced before obstructing. Passing plenty of gas. Denies nausea or vomiting. No fever or chills. Typically has once soft formed bm daily, gets more backed up when he doesn't have mini wheats. No bowel regimen normally. Doesn't recall any recent antibiotics. No opioids regularly.  Prior history of anal HSV.    Last colonoscopy 2016 for diarrhea/jejunal thickening similar to present. Ileum and colon wnl. Colon biopsies with marginal increase in collagen, otherwise normal. During same admission he had push enteroscopy with  extensive biopsies/staining of esophageal, gastric and jejunal tissue that was WNL.     Homeless, currently lives in a shelter. Takes Biktarvy and insulin daily.            Past Medical History:   Reviewed and edited as appropriate  Past Medical History:   Diagnosis Date     AIDS (H)      Allergic rhinitis due to other allergen     DNS     Anal dysplasia      Chronic abdominal pain      CNS toxoplasmosis (H)      Diabetes type 2, controlled (H)      GERD (gastroesophageal reflux disease)      History of seizure      History of substance abuse (H)      HIV (human immunodeficiency virus infection) (H)      HLD (hyperlipidemia)      Lung nodules      Periungual wart      PTSD (post-traumatic stress disorder)      Sleep apnea     doesn't use CPAP            Past Surgical History:   Reviewed and edited as appropriate   Past Surgical History:   Procedure Laterality Date     ANOSCOPY N/A 9/9/2020    Procedure: Exam Under Anesthesia, ANOSCOPY, fulgeration of rectal fissures with Rectal Biopsies;  Surgeon: Thanh Lundberg MD;  Location: [facility] OR     COLONOSCOPY Left 1/22/2016    Procedure: COMBINED COLONOSCOPY, SINGLE OR MULTIPLE BIOPSY/POLYPECTOMY BY BIOPSY;  Surgeon: Clark Saini MD;  Location: [facility] GI     HC EXPLORE UNDESC TESTIS,INGUIN/SCROTAL       LAPAROSCOPIC APPENDECTOMY N/A 1/31/2018    Procedure: LAPAROSCOPIC APPENDECTOMY;  LAPAROSCOPIC APPENDECTOMY;  Surgeon: Dawn Holt MD;  Location: [facility] OR     LAPAROSCOPY DIAGNOSTIC (GENERAL) N/A 7/26/2016    Procedure: LAPAROSCOPY DIAGNOSTIC (GENERAL);  Surgeon: Susannah Arriaga MD;  Location: [facility] OR     LAPAROSCOPY DIAGNOSTIC (GENERAL) N/A 4/16/2018    Procedure: LAPAROSCOPY DIAGNOSTIC (GENERAL);  Diagnostic laparoscopy and lysis of adhesions;  Surgeon: Prince Dowling MD;  Location: [facility] OR     OPTICAL TRACKING SYSTEM CRANIOTOMY, EXCISE TUMOR, COMBINED Left 4/10/2015    Procedure: COMBINED OPTICAL TRACKING SYSTEM CRANIOTOMY, EXCISE TUMOR;  Surgeon:  Mirlande Colmenares MD;  Location: [facility] OR     REPAIR GAMEKEEPER'S THUMB Right 12/2/2016    Procedure: REPAIR LIGAMENT ULNAR COLLATERAL THUMB (GAMEKEEPER'S);  Surgeon: Evin Zamorano MD;  Location:  OR     [facility] NONSPECIFIC PROCEDURE      right forearm fracture            Previous Endoscopy:   No results found. However, due to the size of the patient record, not all encounters were searched. Please check Results Review for a complete set of results.     See hpi         Social History:   Reviewed and edited as appropriate  Social History     Socioeconomic History     Marital status: Single     Spouse name: Not on file     Number of children: Not on file     Years of education: Not on file     Highest education level: Not on file   Occupational History     Occupation:      Comment: 5 years; temp agency     Occupation: Unemployed   Tobacco Use     Smoking status: Current Every Day Smoker     Packs/day: 0.50     Years: 40.00     Pack years: 20.00     Types: Cigarettes     Smokeless tobacco: Former User   Substance and Sexual Activity     Alcohol use: No     Alcohol/week: 0.0 standard drinks     Comment: Last etoh in 2007     Drug use: Not Currently     Types: Marijuana, Methamphetamines     Comment: Sober 9 months . pinsist never did drugs     Sexual activity: Not Currently     Partners: Female, Male     Comment: Last sexual activity 2008   Other Topics Concern     Parent/sibling w/ CABG, MI or angioplasty before 65F 55M? Not Asked   Social History Narrative    Born in [facility].  Came to the [place] in [place].  Last traveled to visit family in 2008.        1/7/2019 - Homeless. Working with a  at [employer]  trying to get housing. Sexually active with two partners recently using condoms 100% of the time. Smokes occasionally, not for the last 4 weeks.        1/7/2020 at [facility] since 1/1/2020. 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  Care established with ID and endocrine     Social Determinants of  "Health     Financial Resource Strain: Not on file   Food Insecurity: Not on file   Transportation Needs: Not on file   Physical Activity: Not on file   Stress: Not on file   Social Connections: Not on file   Intimate Partner Violence: Not on file   Housing Stability: Not on file            Family History:   Reviewed and edited as appropriate  Family History   Problem Relation Age of Onset     Diabetes Brother      Diabetes Father      Alzheimer Disease Father      Unknown/Adopted Mother      Diabetes Paternal Grandfather      Cancer No family hx of         no skin cancer     Skin Cancer No family hx of         no famiy hx of skin cancer     Glaucoma No family hx of      Macular Degeneration No family hx of             Allergies:   Reviewed and edited as appropriate     Allergies   Allergen Reactions     Metformin      Abdominal pain     Tylenol [Acetaminophen] Itching     Dulaglutide Rash     Penicillin V Other (See Comments) and Rash     Diffuse maculopapular rash + feels \"high\", per pt.             Medications:     Current Facility-Administered Medications   Medication     bictegravir-emtricitabine-tenofovir (BIKTARVY) -25 MG per tablet 1 tablet     glucose gel 15-30 g    Or     dextrose 50 % injection 25-50 mL    Or     glucagon injection 1 mg     HYDROmorphone (PF) (DILAUDID) injection 0.5 mg     insulin aspart (NovoLOG) injection (RAPID ACTING)     insulin detemir (LEVEMIR PEN) injection 9 Units     lactated ringers infusion     ondansetron (ZOFRAN-ODT) ODT tab 4 mg    Or     ondansetron (ZOFRAN) injection 4 mg     oxyCODONE (ROXICODONE) tablet 5 mg     pantoprazole (PROTONIX) EC tablet 40 mg     Current Outpatient Medications   Medication Sig     Alcohol Swabs PADS Use to swab the area of the injection or todd as directed Per insurance coverage     bictegravir-emtricitabine-tenofovir (BIKTARVY) -25 MG per tablet Take 1 tablet by mouth daily     blood glucose (NO BRAND SPECIFIED) test strip Use to " test blood sugar 4 times daily or as directed. To accompany:whatever is covered     blood glucose calibration (NO BRAND SPECIFIED) solution Used to calibrate the blood glucose monitor as needed and as directed.  To accompany  blood glucose brands per insurance coverage     blood glucose monitoring (NO BRAND SPECIFIED) meter device kit Use as directed. Per insurance coverage.     glucose 40 % (400 mg/mL) gel 15 g every 15 minutes by mouth as needed for low blood sugar. Oral gel is preferable for conscious and able to swallow patient.     insulin detemir (LEVEMIR PEN) 100 UNIT/ML pen Inject 18 Units Subcutaneous 2 times daily     insulin pen needle (BD SCOTT U/F) 32G X 4 MM miscellaneous USE 3 TO 4 NEEDLES DAILY     NOVOLOG FLEXPEN 100 UNIT/ML soln Inject 10 Units Subcutaneous 3 times daily (with meals)     pantoprazole (PROTONIX) 40 MG EC tablet Take 1 tablet (40 mg) by mouth daily     polyethylene glycol (MIRALAX) 17 GM/Dose powder Take 17 g by mouth daily as needed     SENNA-docusate sodium (SENNA S) 8.6-50 MG tablet Take 1 tablet by mouth 2 times daily as needed (constipation)     Sharps Container MISC Use as directed to dispose of needles, lancets and other sharps. Per Insurance coverage     STATIN NOT PRESCRIBED (INTENTIONAL) Please choose reason not prescribed, below     thin (NO BRAND SPECIFIED) lancets Use with lanceting device. To accompany: Test 4 times daily with whatever is covered             Review of Systems:     A complete review of systems was performed and is negative except as noted in the HPI           Physical Exam:   /70   Pulse 76   Temp 97.2  F (36.2  C) (Oral)   Resp 20   SpO2 97%   Wt:   Wt Readings from Last 2 Encounters:   12/24/21 72.1 kg (159 lb)   12/14/21 72.2 kg (159 lb 3.2 oz)      Constitutional: cooperative, pleasant, not dyspneic/diaphoretic, no acute distress  Eyes: Sclera anicteric/injected  Ears/nose/mouth/throat: Normal oropharynx without ulcers or exudate, mucus  membranes moist, hearing intact  Neck: supple without obvious mass  CV: No edema  Respiratory: Unlabored breathing  Lymph: No submandibular, supraclavicular or inguinal lymphadenopathy  Abd:  Nondistended, +bs, no hepatosplenomegaly, tender lower quadrants, no peritoneal signs  Skin: warm, perfused, no jaundice  Neuro: AAO x 3  Psych: Normal affect  MSK: No gross deformities         Data:   Labs and imaging below were independently reviewed and interpreted    BMP  Recent Labs   Lab 22  0634 22  2258 22  1217     --  133   POTASSIUM 3.7  --  4.1   CHLORIDE 102  --  101   LINDA 9.0  --  10.2*   CO2 28  --  25   BUN 12  --  15   CR 0.65*  --  0.67   * 167* 362*     CBC  Recent Labs   Lab 22  0634 22  1217   WBC 7.7 10.5   RBC 4.81 5.24   HGB 13.3 14.6   HCT 40.3 43.4   MCV 84 83   MCH 27.7 27.9   MCHC 33.0 33.6   RDW 13.7 13.4    476*     INRNo lab results found in last 7 days.  LFTs  Recent Labs   Lab 22  0634 22  1217   ALKPHOS 102 156*   AST 7 8   ALT 16 20   BILITOTAL 0.6 0.5   PROTTOTAL 6.6* 8.0   ALBUMIN 3.0* 3.6      PANC  Recent Labs   Lab 22  1217   LIPASE 295       Imagin2022 CT abdomen pelvis    IMPRESSION:  1. Distended loop of jejunum with fecalization. This loop of bowel is  dilated and has questionable wall thickening. Given history of  HIV-AIDS this finding could potentially be related to small intestinal  bacterial overgrowth which can be seen in HIV related autonomic  neuropathies. Other loops of bowel are unremarkable. No evidence for  bowel obstruction.  2. Cholelithiasis within the gallbladder neck. No evidence for acute  cholecystitis or biliary obstruction.  3. Prostatomegaly similar to prior exam.     I have personally reviewed the examination and initial interpretation  and I agree with the findings.     JANIE VANN MD

## 2022-01-14 LAB
GLUCOSE BLDC GLUCOMTR-MCNC: 176 MG/DL (ref 70–99)
GLUCOSE BLDC GLUCOMTR-MCNC: 308 MG/DL (ref 70–99)
GLUCOSE BLDC GLUCOMTR-MCNC: 388 MG/DL (ref 70–99)

## 2022-01-14 PROCEDURE — G0378 HOSPITAL OBSERVATION PER HR: HCPCS

## 2022-01-14 NOTE — DISCHARGE SUMMARY
[facility]  Hospitalist Discharge Summary      Date of Admission:  1/12/2022  Date of Discharge:  1/14/2022  1:36 AM  Discharging Provider: Henry Coombs MD  Discharge Team: Hospitalist Service, [place] [id number]    Discharge Diagnoses   Partial SBO, resolved  Poorly controlled DM2 on insulin  COVID-19 positive, 1/4/2022  AIDS, CD4 > 500  Hx of CNS toxoplasmosis  Homless  Left AMA    Follow-ups Needed After Discharge     Discharge Disposition   Patient left AMA, he is homeless.   Condition at discharge: Stable      Hospital Course     Andrew Lockwood is a 54 year old male admitted on 1/12/2022. He has a past medical history significant for AIDS on Biktarvy, recurrent SBO, GERD and poorly controlled DM2 who presented to the ED with left sided abdominal pain with concern for SBO. He tested positive for COVID-19 on 1/4/2021 and is admitted for observation.   His obstruction resolved with conservative treatment with IVFs and pain control.  He tolerated diet.  GI evaluated patient due to hx of AIDS and recurrent bowel obstruction.  GI recommended a scheduled bowel regimen.  I also added fiber as patient said this helps with BMs.  Per GI, can treat with cipro for 14 days 500 BID for empiric treatment of SIBO if patient has bowel obstruction again.   If this occurs again despite that, consider surgery consult for diagnostic lap yelitza again (had it in 2018).   Patient left against medical advice in the middle of the night and I don't think he left with any of the bowel meds we were giving him inpatient.       Consultations This Hospital Stay   GI LUMINAL ADULT IP CONSULT  SOCIAL WORK IP CONSULT    Code Status   Prior    Time Spent on this Encounter          Henry Coombs MD  [facility] EMERGENCY DEPARTMENT  [address] [address]  Phone: [phone]  ______________________________________________________________________    Physical Exam   Vital Signs:                     Weight: 0 lbs 0 oz  I couldn't examine prior to discharge.  He left AMA.        Primary Care Physician   Ayala Prieto    Discharge Orders   No discharge procedures on file.    Significant Results and Procedures       Discharge Medications   Discharge Medication List as of 1/14/2022  1:36 AM      CONTINUE these medications which have NOT CHANGED    Details   bictegravir-emtricitabine-tenofovir (BIKTARVY) -25 MG per tablet Take 1 tablet by mouth daily, Disp-30 tablet, R-6, E-Prescribe      glucose 40 % (400 mg/mL) gel 15 g every 15 minutes by mouth as needed for low blood sugar. Oral gel is preferable for conscious and able to swallow patient.Disp-112.5 g, [id number]      insulin detemir (LEVEMIR PEN) 100 UNIT/ML pen Inject 18 Units Subcutaneous 2 times daily, Disp-100 mL, R-0, E-Prescribe      NOVOLOG FLEXPEN 100 UNIT/ML soln Inject 10 Units Subcutaneous 3 times daily (with meals), Disp-75 mL, R-0, OLESYA, E-Prescribe      pantoprazole (PROTONIX) 40 MG EC tablet Take 1 tablet (40 mg) by mouth daily, Disp-30 tablet, R-6, E-Prescribe      polyethylene glycol (MIRALAX) 17 GM/Dose powder Take 17 g by mouth daily as needed, Disp-510 g, R-0, E-Prescribe      SENNA-docusate sodium (SENNA S) 8.6-50 MG tablet Take 1 tablet by mouth 2 times daily as needed (constipation), Disp-30 tablet, R-0, Local Print      Alcohol Swabs PADS Use to swab the area of the injection or todd as directed Per insurance coverage, Disp-100 each, R-0, E-Prescribe      blood glucose (NO BRAND SPECIFIED) test strip Use to test blood sugar 4 times daily or as directed. To accompany:whatever is covered, Disp-360 strip, R-3, E-Prescribe      blood glucose calibration (NO BRAND SPECIFIED) solution Used to calibrate the blood glucose monitor as needed and as directed.  To accompany  blood glucose brands per insurance coverage, Disp-1 each, R-0, E-Prescribe      blood glucose monitoring (NO BRAND SPECIFIED) meter device kit Use as directed.  "Per insurance coverage.Disp-1 kit, [id number]      insulin pen needle (BD SCOTT U/F) 32G X 4 MM miscellaneous USE 3 TO 4 NEEDLES DAILYDisp-400 each, [id number]      Sharps Container MISC Use as directed to dispose of needles, lancets and other sharps. Per Insurance coverage, Disp-1 each, R-0, E-Prescribe      STATIN NOT PRESCRIBED (INTENTIONAL) Reason Statin was Not Prescribed: OTHER - Enter reason is comments section (This option does NOTexclude patient from measure) / noted in chart that is recommended multiple timesNo Print Out      thin (NO BRAND SPECIFIED) lancets Use with lanceting device. To accompany: Test 4 times daily with whatever is covered, Disp-360 each, R-3, E-Prescribe           Allergies   Allergies   Allergen Reactions     Metformin      Abdominal pain     Tylenol [Acetaminophen] Itching     Dulaglutide Rash     Penicillin V Other (See Comments) and Rash     Diffuse maculopapular rash + feels \"high\", per pt.      "

## 2022-01-14 NOTE — ED NOTES
Patient states he is leaving AMA. Patient refuses to sign AMA form. A0x3, vitally stable. Removed IV

## 2022-01-14 NOTE — TELEPHONE ENCOUNTER
RECORDS RECEIVED FROM: Hemoglobin A1C still High   DATE RECEIVED: 3/31/2022   NOTES (FOR ALL VISITS) STATUS DETAILS   OFFICE NOTES from referring provider N/A    OFFICE NOTES from other specialist Internal [facility]:  ([facility]) 2/10/22 - ENDO OV with HAO Torres  8/24/20 - ENDO OV with [facility] Matson    [facility]  [facility] with Sharlene Lewis NP   ED NOTES Internal [facility]:  12/24/21 - Admission with Dr. Jurado  12/12/21 - Admission with Dr. Coombs  * More ED/Admission in [facility]   OPERATIVE REPORT  (thyroid, pituitary, adrenal, parathyroid) N/A    MEDICATION LIST Internal    IMAGING      DEXASCAN N/A    MRI (BRAIN) N/A    XR (Chest) Internal [facility]:  11/30/21 - XR Chest  10/30/21 - XR Chest   CT (HEAD/NECK/CHEST/ABDOMEN) [facility]:  1/12/22 - CT Abd/Pelvis  12/24/21 - CT Abd/Pelvis  2/3/21 - CT Head  9/24/20 - CT Neck  7/8/20 - CT Chest  * Additional Images in Epic/PACS   NUCLEAR  N/A    ULTRASOUND (HEAD/NECK) N/A    LABS     DIABETES: HBGA1C, CREATININE, FASTING LIPIDS, MICROALBUMIN URINE, POTASSIUM, TSH, T4    THYROID: TSH, T4, CBC, THYRODLONULIN, TOTAL T3, FREE T4, CALCITONIN, CEA Internal   [facility]:  1/13/22 - CMP  1/13/22, 1/12/22 - CBC (more in Epic)  12/25/21 - Potassium  12/25/21 - UA  12/24/21 - HBGA1C  12/15/21 - BMP  8/24/21 - Lipid  1/10/21 - Creatinine

## 2022-01-20 ENCOUNTER — APPOINTMENT (OUTPATIENT)
Dept: CT IMAGING | Facility: CLINIC | Age: 57
End: 2022-01-20
Attending: EMERGENCY MEDICINE
Payer: COMMERCIAL

## 2022-01-20 ENCOUNTER — HOSPITAL ENCOUNTER (EMERGENCY)
Facility: CLINIC | Age: 57
Discharge: HOME | End: 2022-01-20
Attending: EMERGENCY MEDICINE | Admitting: EMERGENCY MEDICINE
Payer: COMMERCIAL

## 2022-01-20 VITALS
TEMPERATURE: 98.7 F | SYSTOLIC BLOOD PRESSURE: 118 MMHG | HEART RATE: 97 BPM | OXYGEN SATURATION: 98 % | DIASTOLIC BLOOD PRESSURE: 68 MMHG | RESPIRATION RATE: 18 BRPM

## 2022-01-20 DIAGNOSIS — S09.90XA CLOSED HEAD INJURY, INITIAL ENCOUNTER: ICD-10-CM
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
## 2022-01-20 LAB — GLUCOSE BLDC GLUCOMTR-MCNC: 207 MG/DL (ref 70–99)

## 2022-01-20 PROCEDURE — 99284 EMERGENCY DEPT VISIT MOD MDM: CPT | Performed by: EMERGENCY MEDICINE

## 2022-01-20 PROCEDURE — 70450 CT HEAD/BRAIN W/O DYE: CPT | Mod: 26 | Performed by: RADIOLOGY

## 2022-01-20 PROCEDURE — 99284 EMERGENCY DEPT VISIT MOD MDM: CPT | Mod: 25 | Performed by: EMERGENCY MEDICINE

## 2022-01-20 PROCEDURE — 70450 CT HEAD/BRAIN W/O DYE: CPT

## 2022-01-20 ASSESSMENT — ENCOUNTER SYMPTOMS
FEVER: 0
NAUSEA: 0
NECK PAIN: 1
HEADACHES: 1
LIGHT-HEADEDNESS: 1
ARTHRALGIAS: 1
COUGH: 0
VOMITING: 0
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
## 2022-01-20 NOTE — DISCHARGE INSTRUCTIONS
Please make an appointment to follow up with Your Primary Care Provider in 3-7 days as needed.    Enlist someone to supervise you closely for the next 48 hours.    Return to the ED for worsening headache, behavior changes, confusion, or recurrent vomiting.     Limit exposure to light, noise, and physical or cognitive activity if this causes headache, nausea, or increased irritability.     You may return to normal activity if you do not have any symptoms of a concussion in the next 48 hours.     Follow up with primary care before resuming normal activity, if you do develop symptoms of a concussion. Please read the attached handout for further instructions on monitoring your symptoms.

## 2022-01-22 ENCOUNTER — HOSPITAL ENCOUNTER (INPATIENT)
Facility: CLINIC | Age: 57
LOS: 3 days | Discharge: NURSING, REHAB OR LONG-TERM CARE | End: 2022-01-25
Attending: INTERNAL MEDICINE | Admitting: STUDENT IN AN ORGANIZED HEALTH CARE EDUCATION/TRAINING PROGRAM
Payer: COMMERCIAL

## 2022-01-22 ENCOUNTER — APPOINTMENT (OUTPATIENT)
Dept: GENERAL RADIOLOGY | Facility: CLINIC | Age: 57
End: 2022-01-22
Attending: INTERNAL MEDICINE
Payer: COMMERCIAL

## 2022-01-22 ENCOUNTER — APPOINTMENT (OUTPATIENT)
Dept: CT IMAGING | Facility: CLINIC | Age: 57
End: 2022-01-22
Attending: INTERNAL MEDICINE
Payer: COMMERCIAL

## 2022-01-22 DIAGNOSIS — E11.00 TYPE 2 DIABETES MELLITUS WITH HYPEROSMOLARITY WITHOUT COMA, WITH LONG-TERM CURRENT USE OF INSULIN (H): Primary | ICD-10-CM

## 2022-01-22 DIAGNOSIS — Z79.4 TYPE 2 DIABETES MELLITUS WITH HYPEROSMOLARITY WITHOUT COMA, WITH LONG-TERM CURRENT USE OF INSULIN (H): Primary | ICD-10-CM

## 2022-01-22 DIAGNOSIS — K56.609 SMALL BOWEL OBSTRUCTION (H): ICD-10-CM

## 2022-01-22 DIAGNOSIS — B20 HUMAN IMMUNODEFICIENCY VIRUS (HIV) DISEASE (H): ICD-10-CM

## 2022-01-22 LAB
ABO/RH(D): NORMAL
ALBUMIN SERPL-MCNC: 3.1 G/DL (ref 3.4–5)
ALP SERPL-CCNC: 123 U/L (ref 40–150)
ALT SERPL W P-5'-P-CCNC: 17 U/L (ref 0–70)
ANION GAP SERPL CALCULATED.3IONS-SCNC: 4 MMOL/L (ref 3–14)
ANTIBODY SCREEN: NEGATIVE
AST SERPL W P-5'-P-CCNC: 9 U/L (ref 0–45)
BASOPHILS # BLD AUTO: 0 10E3/UL (ref 0–0.2)
BASOPHILS NFR BLD AUTO: 0 %
BILIRUB SERPL-MCNC: 0.6 MG/DL (ref 0.2–1.3)
BUN SERPL-MCNC: 13 MG/DL (ref 7–30)
CALCIUM SERPL-MCNC: 9.2 MG/DL (ref 8.5–10.1)
CHLORIDE BLD-SCNC: 104 MMOL/L (ref 94–109)
CO2 SERPL-SCNC: 25 MMOL/L (ref 20–32)
CREAT SERPL-MCNC: 0.68 MG/DL (ref 0.66–1.25)
CRP SERPL-MCNC: 34 MG/L (ref 0–8)
EOSINOPHIL # BLD AUTO: 0.1 10E3/UL (ref 0–0.7)
EOSINOPHIL NFR BLD AUTO: 1 %
ERYTHROCYTE [DISTWIDTH] IN BLOOD BY AUTOMATED COUNT: 13.7 % (ref 10–15)
GFR SERPL CREATININE-BSD FRML MDRD: >90 ML/MIN/1.73M2
GLUCOSE BLD-MCNC: 431 MG/DL (ref 70–99)
GLUCOSE BLDC GLUCOMTR-MCNC: 208 MG/DL (ref 70–99)
HCT VFR BLD AUTO: 42.2 % (ref 40–53)
HGB BLD-MCNC: 13.7 G/DL (ref 13.3–17.7)
HOLD SPECIMEN: NORMAL
IMM GRANULOCYTES # BLD: 0.1 10E3/UL
IMM GRANULOCYTES NFR BLD: 1 %
INR PPP: 1.04 (ref 0.85–1.15)
LACTATE SERPL-SCNC: 2.4 MMOL/L (ref 0.7–2)
LIPASE SERPL-CCNC: 172 U/L (ref 73–393)
LYMPHOCYTES # BLD AUTO: 2.2 10E3/UL (ref 0.8–5.3)
LYMPHOCYTES NFR BLD AUTO: 21 %
MCH RBC QN AUTO: 27.9 PG (ref 26.5–33)
MCHC RBC AUTO-ENTMCNC: 32.5 G/DL (ref 31.5–36.5)
MCV RBC AUTO: 86 FL (ref 78–100)
MONOCYTES # BLD AUTO: 1 10E3/UL (ref 0–1.3)
MONOCYTES NFR BLD AUTO: 10 %
NEUTROPHILS # BLD AUTO: 6.8 10E3/UL (ref 1.6–8.3)
NEUTROPHILS NFR BLD AUTO: 67 %
NRBC # BLD AUTO: 0 10E3/UL
NRBC BLD AUTO-RTO: 0 /100
PLATELET # BLD AUTO: 358 10E3/UL (ref 150–450)
POTASSIUM BLD-SCNC: 3.8 MMOL/L (ref 3.4–5.3)
PROT SERPL-MCNC: 7.6 G/DL (ref 6.8–8.8)
RBC # BLD AUTO: 4.91 10E6/UL (ref 4.4–5.9)
SODIUM SERPL-SCNC: 133 MMOL/L (ref 133–144)
SPECIMEN EXPIRATION DATE: NORMAL
WBC # BLD AUTO: 10.1 10E3/UL (ref 4–11)

## 2022-01-22 PROCEDURE — 86360 T CELL ABSOLUTE COUNT/RATIO: CPT | Performed by: INTERNAL MEDICINE

## 2022-01-22 PROCEDURE — 250N000011 HC RX IP 250 OP 636: Performed by: INTERNAL MEDICINE

## 2022-01-22 PROCEDURE — 96374 THER/PROPH/DIAG INJ IV PUSH: CPT | Performed by: INTERNAL MEDICINE

## 2022-01-22 PROCEDURE — 99285 EMERGENCY DEPT VISIT HI MDM: CPT | Performed by: INTERNAL MEDICINE

## 2022-01-22 PROCEDURE — 74018 RADEX ABDOMEN 1 VIEW: CPT | Mod: 26 | Performed by: RADIOLOGY

## 2022-01-22 PROCEDURE — 250N000013 HC RX MED GY IP 250 OP 250 PS 637: Performed by: INTERNAL MEDICINE

## 2022-01-22 PROCEDURE — 80053 COMPREHEN METABOLIC PANEL: CPT | Performed by: INTERNAL MEDICINE

## 2022-01-22 PROCEDURE — 74177 CT ABD & PELVIS W/CONTRAST: CPT | Mod: 26 | Performed by: RADIOLOGY

## 2022-01-22 PROCEDURE — 99222 1ST HOSP IP/OBS MODERATE 55: CPT | Mod: 25 | Performed by: STUDENT IN AN ORGANIZED HEALTH CARE EDUCATION/TRAINING PROGRAM

## 2022-01-22 PROCEDURE — 83605 ASSAY OF LACTIC ACID: CPT | Performed by: INTERNAL MEDICINE

## 2022-01-22 PROCEDURE — 71045 X-RAY EXAM CHEST 1 VIEW: CPT | Mod: 26 | Performed by: RADIOLOGY

## 2022-01-22 PROCEDURE — 36415 COLL VENOUS BLD VENIPUNCTURE: CPT

## 2022-01-22 PROCEDURE — 250N000012 HC RX MED GY IP 250 OP 636 PS 637

## 2022-01-22 PROCEDURE — 74177 CT ABD & PELVIS W/CONTRAST: CPT

## 2022-01-22 PROCEDURE — 258N000003 HC RX IP 258 OP 636: Performed by: INTERNAL MEDICINE

## 2022-01-22 PROCEDURE — 71045 X-RAY EXAM CHEST 1 VIEW: CPT

## 2022-01-22 PROCEDURE — 250N000009 HC RX 250: Performed by: INTERNAL MEDICINE

## 2022-01-22 PROCEDURE — 86140 C-REACTIVE PROTEIN: CPT | Performed by: INTERNAL MEDICINE

## 2022-01-22 PROCEDURE — 99285 EMERGENCY DEPT VISIT HI MDM: CPT | Mod: 25 | Performed by: INTERNAL MEDICINE

## 2022-01-22 PROCEDURE — 85025 COMPLETE CBC W/AUTO DIFF WBC: CPT | Performed by: INTERNAL MEDICINE

## 2022-01-22 PROCEDURE — 96361 HYDRATE IV INFUSION ADD-ON: CPT | Performed by: INTERNAL MEDICINE

## 2022-01-22 PROCEDURE — 250N000011 HC RX IP 250 OP 636

## 2022-01-22 PROCEDURE — 36415 COLL VENOUS BLD VENIPUNCTURE: CPT | Performed by: INTERNAL MEDICINE

## 2022-01-22 PROCEDURE — 83036 HEMOGLOBIN GLYCOSYLATED A1C: CPT

## 2022-01-22 PROCEDURE — 83690 ASSAY OF LIPASE: CPT | Performed by: INTERNAL MEDICINE

## 2022-01-22 PROCEDURE — 86901 BLOOD TYPING SEROLOGIC RH(D): CPT | Performed by: INTERNAL MEDICINE

## 2022-01-22 PROCEDURE — 120N000011 HC R&B TRANSPLANT UMMC

## 2022-01-22 PROCEDURE — 99222 1ST HOSP IP/OBS MODERATE 55: CPT | Mod: GC | Performed by: SURGERY

## 2022-01-22 PROCEDURE — 85610 PROTHROMBIN TIME: CPT | Performed by: INTERNAL MEDICINE

## 2022-01-22 PROCEDURE — 74018 RADEX ABDOMEN 1 VIEW: CPT

## 2022-01-22 PROCEDURE — 999N000065 XR ABDOMEN PORT 1 VIEWS

## 2022-01-22 PROCEDURE — 99207 PR CDG-MDM COMPONENT: MEETS MODERATE - DOWN CODED: CPT | Performed by: STUDENT IN AN ORGANIZED HEALTH CARE EDUCATION/TRAINING PROGRAM

## 2022-01-22 PROCEDURE — 258N000003 HC RX IP 258 OP 636

## 2022-01-22 RX ORDER — AMOXICILLIN 250 MG
2 CAPSULE ORAL 2 TIMES DAILY
Status: DISCONTINUED | OUTPATIENT
Start: 2022-01-22 | End: 2022-01-22

## 2022-01-22 RX ORDER — SODIUM CHLORIDE, SODIUM LACTATE, POTASSIUM CHLORIDE, CALCIUM CHLORIDE 600; 310; 30; 20 MG/100ML; MG/100ML; MG/100ML; MG/100ML
INJECTION, SOLUTION INTRAVENOUS CONTINUOUS
Status: DISPENSED | OUTPATIENT
Start: 2022-01-22 | End: 2022-01-23

## 2022-01-22 RX ORDER — IOPAMIDOL 755 MG/ML
89 INJECTION, SOLUTION INTRAVASCULAR ONCE
Status: COMPLETED | OUTPATIENT
Start: 2022-01-22 | End: 2022-01-22

## 2022-01-22 RX ORDER — HYDROMORPHONE HYDROCHLORIDE 1 MG/ML
0.5 INJECTION, SOLUTION INTRAMUSCULAR; INTRAVENOUS; SUBCUTANEOUS EVERY 4 HOURS PRN
Status: DISCONTINUED | OUTPATIENT
Start: 2022-01-22 | End: 2022-01-25 | Stop reason: HOSPADM

## 2022-01-22 RX ORDER — PROCHLORPERAZINE MALEATE 5 MG
10 TABLET ORAL EVERY 6 HOURS PRN
Status: DISCONTINUED | OUTPATIENT
Start: 2022-01-22 | End: 2022-01-25 | Stop reason: HOSPADM

## 2022-01-22 RX ORDER — ACETAMINOPHEN 650 MG/1
650 SUPPOSITORY RECTAL EVERY 6 HOURS PRN
Status: DISCONTINUED | OUTPATIENT
Start: 2022-01-22 | End: 2022-01-25 | Stop reason: HOSPADM

## 2022-01-22 RX ORDER — AMOXICILLIN 250 MG
1 CAPSULE ORAL 2 TIMES DAILY
Status: DISCONTINUED | OUTPATIENT
Start: 2022-01-22 | End: 2022-01-22

## 2022-01-22 RX ORDER — SODIUM CHLORIDE 9 MG/ML
INJECTION, SOLUTION INTRAVENOUS CONTINUOUS
Status: DISCONTINUED | OUTPATIENT
Start: 2022-01-22 | End: 2022-01-22

## 2022-01-22 RX ORDER — ONDANSETRON 4 MG/1
4 TABLET, ORALLY DISINTEGRATING ORAL EVERY 6 HOURS PRN
Status: DISCONTINUED | OUTPATIENT
Start: 2022-01-22 | End: 2022-01-25 | Stop reason: HOSPADM

## 2022-01-22 RX ORDER — AMOXICILLIN 250 MG
1 CAPSULE ORAL 2 TIMES DAILY PRN
Status: DISCONTINUED | OUTPATIENT
Start: 2022-01-22 | End: 2022-01-22

## 2022-01-22 RX ORDER — LIDOCAINE 40 MG/G
CREAM TOPICAL
Status: DISCONTINUED | OUTPATIENT
Start: 2022-01-22 | End: 2022-01-25 | Stop reason: HOSPADM

## 2022-01-22 RX ORDER — PROCHLORPERAZINE 25 MG
25 SUPPOSITORY, RECTAL RECTAL EVERY 12 HOURS PRN
Status: DISCONTINUED | OUTPATIENT
Start: 2022-01-22 | End: 2022-01-25 | Stop reason: HOSPADM

## 2022-01-22 RX ORDER — NICOTINE POLACRILEX 4 MG
15-30 LOZENGE BUCCAL
Status: DISCONTINUED | OUTPATIENT
Start: 2022-01-22 | End: 2022-01-25 | Stop reason: HOSPADM

## 2022-01-22 RX ORDER — DEXTROSE MONOHYDRATE 25 G/50ML
25-50 INJECTION, SOLUTION INTRAVENOUS
Status: DISCONTINUED | OUTPATIENT
Start: 2022-01-22 | End: 2022-01-25 | Stop reason: HOSPADM

## 2022-01-22 RX ORDER — ACETAMINOPHEN 325 MG/1
650 TABLET ORAL EVERY 6 HOURS PRN
Status: DISCONTINUED | OUTPATIENT
Start: 2022-01-22 | End: 2022-01-25 | Stop reason: HOSPADM

## 2022-01-22 RX ORDER — PANTOPRAZOLE SODIUM 40 MG/1
40 TABLET, DELAYED RELEASE ORAL DAILY
Status: DISCONTINUED | OUTPATIENT
Start: 2022-01-23 | End: 2022-01-25 | Stop reason: HOSPADM

## 2022-01-22 RX ORDER — ONDANSETRON 2 MG/ML
4 INJECTION INTRAMUSCULAR; INTRAVENOUS EVERY 6 HOURS PRN
Status: DISCONTINUED | OUTPATIENT
Start: 2022-01-22 | End: 2022-01-25 | Stop reason: HOSPADM

## 2022-01-22 RX ORDER — HYDROMORPHONE HYDROCHLORIDE 1 MG/ML
0.5 INJECTION, SOLUTION INTRAMUSCULAR; INTRAVENOUS; SUBCUTANEOUS ONCE
Status: COMPLETED | OUTPATIENT
Start: 2022-01-22 | End: 2022-01-22

## 2022-01-22 RX ORDER — AMOXICILLIN 250 MG
2 CAPSULE ORAL 2 TIMES DAILY PRN
Status: DISCONTINUED | OUTPATIENT
Start: 2022-01-22 | End: 2022-01-22

## 2022-01-22 RX ADMIN — HYDROMORPHONE HYDROCHLORIDE 0.5 MG: 1 INJECTION, SOLUTION INTRAMUSCULAR; INTRAVENOUS; SUBCUTANEOUS at 17:00

## 2022-01-22 RX ADMIN — IOPAMIDOL 89 ML: 755 INJECTION, SOLUTION INTRAVENOUS at 18:11

## 2022-01-22 RX ADMIN — INSULIN ASPART 2 UNITS: 100 INJECTION, SOLUTION INTRAVENOUS; SUBCUTANEOUS at 22:38

## 2022-01-22 RX ADMIN — LIDOCAINE HYDROCHLORIDE 30 ML: 20 SOLUTION ORAL; TOPICAL at 18:32

## 2022-01-22 RX ADMIN — HYDROMORPHONE HYDROCHLORIDE 0.5 MG: 1 INJECTION, SOLUTION INTRAMUSCULAR; INTRAVENOUS; SUBCUTANEOUS at 20:18

## 2022-01-22 RX ADMIN — ENOXAPARIN SODIUM 40 MG: 40 INJECTION SUBCUTANEOUS at 22:40

## 2022-01-22 RX ADMIN — SODIUM CHLORIDE, POTASSIUM CHLORIDE, SODIUM LACTATE AND CALCIUM CHLORIDE: 600; 310; 30; 20 INJECTION, SOLUTION INTRAVENOUS at 20:18

## 2022-01-22 RX ADMIN — PROCHLORPERAZINE EDISYLATE 10 MG: 5 INJECTION INTRAMUSCULAR; INTRAVENOUS at 16:52

## 2022-01-22 RX ADMIN — SODIUM CHLORIDE 1000 ML: 9 INJECTION, SOLUTION INTRAVENOUS at 16:52

## 2022-01-22 ASSESSMENT — ENCOUNTER SYMPTOMS
WHEEZING: 0
BRUISES/BLEEDS EASILY: 0
UNEXPECTED WEIGHT CHANGE: 1
VOMITING: 1
CHEST TIGHTNESS: 0
NAUSEA: 1
FEVER: 0
SHORTNESS OF BREATH: 0
WEAKNESS: 0
LIGHT-HEADEDNESS: 0
ABDOMINAL PAIN: 1
HEADACHES: 0
BACK PAIN: 0
SINUS PAIN: 0
RHINORRHEA: 0
DIARRHEA: 1
NECK PAIN: 0
DYSURIA: 0
CONFUSION: 0
COUGH: 0
CHILLS: 0
DIZZINESS: 0
ADENOPATHY: 0
TROUBLE SWALLOWING: 0
APPETITE CHANGE: 1
BLOOD IN STOOL: 0
SINUS PRESSURE: 0
NUMBNESS: 0

## 2022-01-22 ASSESSMENT — ACTIVITIES OF DAILY LIVING (ADL)
ADLS_ACUITY_SCORE: 9

## 2022-01-22 ASSESSMENT — MIFFLIN-ST. JEOR: SCORE: 1398.72

## 2022-01-22 NOTE — ED TRIAGE NOTES
Patient BIBA from the shelter where he lives with complaints of nausea, vomiting and abdominal pain. Patient has a long history of bowel obstructions and feels that these symptoms are similar to previous bowel obstruction. Received 500ml bolus and 4mg zofran in route with minimal relief of nausea.
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
## 2022-01-23 ENCOUNTER — APPOINTMENT (OUTPATIENT)
Dept: GENERAL RADIOLOGY | Facility: CLINIC | Age: 57
End: 2022-01-23
Payer: COMMERCIAL

## 2022-01-23 LAB
ALBUMIN SERPL-MCNC: 2.7 G/DL (ref 3.4–5)
ALP SERPL-CCNC: 111 U/L (ref 40–150)
ALT SERPL W P-5'-P-CCNC: 15 U/L (ref 0–70)
ANION GAP SERPL CALCULATED.3IONS-SCNC: 8 MMOL/L (ref 3–14)
AST SERPL W P-5'-P-CCNC: 10 U/L (ref 0–45)
BILIRUB SERPL-MCNC: 0.5 MG/DL (ref 0.2–1.3)
BUN SERPL-MCNC: 12 MG/DL (ref 7–30)
CALCIUM SERPL-MCNC: 8.8 MG/DL (ref 8.5–10.1)
CHLORIDE BLD-SCNC: 104 MMOL/L (ref 94–109)
CO2 SERPL-SCNC: 26 MMOL/L (ref 20–32)
CREAT SERPL-MCNC: 0.67 MG/DL (ref 0.66–1.25)
ERYTHROCYTE [DISTWIDTH] IN BLOOD BY AUTOMATED COUNT: 14 % (ref 10–15)
GFR SERPL CREATININE-BSD FRML MDRD: >90 ML/MIN/1.73M2
GLUCOSE BLD-MCNC: 272 MG/DL (ref 70–99)
GLUCOSE BLDC GLUCOMTR-MCNC: 233 MG/DL (ref 70–99)
GLUCOSE BLDC GLUCOMTR-MCNC: 234 MG/DL (ref 70–99)
GLUCOSE BLDC GLUCOMTR-MCNC: 289 MG/DL (ref 70–99)
GLUCOSE BLDC GLUCOMTR-MCNC: 301 MG/DL (ref 70–99)
GLUCOSE BLDC GLUCOMTR-MCNC: 304 MG/DL (ref 70–99)
HBA1C MFR BLD: 11.5 % (ref 0–5.6)
HCT VFR BLD AUTO: 38.7 % (ref 40–53)
HGB BLD-MCNC: 12.6 G/DL (ref 13.3–17.7)
HOLD SPECIMEN: NORMAL
MAGNESIUM SERPL-MCNC: 2.1 MG/DL (ref 1.6–2.3)
MCH RBC QN AUTO: 27.8 PG (ref 26.5–33)
MCHC RBC AUTO-ENTMCNC: 32.6 G/DL (ref 31.5–36.5)
MCV RBC AUTO: 85 FL (ref 78–100)
PHOSPHATE SERPL-MCNC: 2.6 MG/DL (ref 2.5–4.5)
PHOSPHATE SERPL-MCNC: 2.7 MG/DL (ref 2.5–4.5)
PLATELET # BLD AUTO: 342 10E3/UL (ref 150–450)
POTASSIUM BLD-SCNC: 3.7 MMOL/L (ref 3.4–5.3)
POTASSIUM BLD-SCNC: 3.7 MMOL/L (ref 3.4–5.3)
PROT SERPL-MCNC: 6.7 G/DL (ref 6.8–8.8)
RBC # BLD AUTO: 4.54 10E6/UL (ref 4.4–5.9)
SODIUM SERPL-SCNC: 138 MMOL/L (ref 133–144)
WBC # BLD AUTO: 8.4 10E3/UL (ref 4–11)

## 2022-01-23 PROCEDURE — 258N000003 HC RX IP 258 OP 636: Performed by: STUDENT IN AN ORGANIZED HEALTH CARE EDUCATION/TRAINING PROGRAM

## 2022-01-23 PROCEDURE — 36415 COLL VENOUS BLD VENIPUNCTURE: CPT

## 2022-01-23 PROCEDURE — 99232 SBSQ HOSP IP/OBS MODERATE 35: CPT | Performed by: INTERNAL MEDICINE

## 2022-01-23 PROCEDURE — 85027 COMPLETE CBC AUTOMATED: CPT

## 2022-01-23 PROCEDURE — 96375 TX/PRO/DX INJ NEW DRUG ADDON: CPT | Performed by: INTERNAL MEDICINE

## 2022-01-23 PROCEDURE — 80053 COMPREHEN METABOLIC PANEL: CPT

## 2022-01-23 PROCEDURE — 82040 ASSAY OF SERUM ALBUMIN: CPT

## 2022-01-23 PROCEDURE — 74018 RADEX ABDOMEN 1 VIEW: CPT

## 2022-01-23 PROCEDURE — 120N000011 HC R&B TRANSPLANT UMMC

## 2022-01-23 PROCEDURE — 83735 ASSAY OF MAGNESIUM: CPT

## 2022-01-23 PROCEDURE — 250N000013 HC RX MED GY IP 250 OP 250 PS 637

## 2022-01-23 PROCEDURE — 96361 HYDRATE IV INFUSION ADD-ON: CPT | Performed by: INTERNAL MEDICINE

## 2022-01-23 PROCEDURE — 250N000011 HC RX IP 250 OP 636

## 2022-01-23 PROCEDURE — 74018 RADEX ABDOMEN 1 VIEW: CPT | Mod: 26 | Performed by: RADIOLOGY

## 2022-01-23 PROCEDURE — 84132 ASSAY OF SERUM POTASSIUM: CPT

## 2022-01-23 PROCEDURE — 84100 ASSAY OF PHOSPHORUS: CPT

## 2022-01-23 PROCEDURE — 250N000012 HC RX MED GY IP 250 OP 636 PS 637: Performed by: INTERNAL MEDICINE

## 2022-01-23 RX ORDER — SODIUM CHLORIDE, SODIUM LACTATE, POTASSIUM CHLORIDE, CALCIUM CHLORIDE 600; 310; 30; 20 MG/100ML; MG/100ML; MG/100ML; MG/100ML
INJECTION, SOLUTION INTRAVENOUS CONTINUOUS
Status: DISCONTINUED | OUTPATIENT
Start: 2022-01-23 | End: 2022-01-23

## 2022-01-23 RX ADMIN — INSULIN ASPART 4 UNITS: 100 INJECTION, SOLUTION INTRAVENOUS; SUBCUTANEOUS at 16:58

## 2022-01-23 RX ADMIN — HYDROMORPHONE HYDROCHLORIDE 0.5 MG: 1 INJECTION, SOLUTION INTRAMUSCULAR; INTRAVENOUS; SUBCUTANEOUS at 06:05

## 2022-01-23 RX ADMIN — ENOXAPARIN SODIUM 40 MG: 40 INJECTION SUBCUTANEOUS at 20:30

## 2022-01-23 RX ADMIN — INSULIN ASPART 4 UNITS: 100 INJECTION, SOLUTION INTRAVENOUS; SUBCUTANEOUS at 20:30

## 2022-01-23 RX ADMIN — INSULIN ASPART 5 UNITS: 100 INJECTION, SOLUTION INTRAVENOUS; SUBCUTANEOUS at 23:58

## 2022-01-23 RX ADMIN — INSULIN ASPART 2 UNITS: 100 INJECTION, SOLUTION INTRAVENOUS; SUBCUTANEOUS at 12:27

## 2022-01-23 RX ADMIN — INSULIN DETEMIR 9 UNITS: 100 INJECTION, SOLUTION SUBCUTANEOUS at 20:30

## 2022-01-23 RX ADMIN — BICTEGRAVIR SODIUM, EMTRICITABINE, AND TENOFOVIR ALAFENAMIDE FUMARATE 1 TABLET: 50; 200; 25 TABLET ORAL at 09:37

## 2022-01-23 RX ADMIN — INSULIN ASPART 2 UNITS: 100 INJECTION, SOLUTION INTRAVENOUS; SUBCUTANEOUS at 07:01

## 2022-01-23 RX ADMIN — SODIUM CHLORIDE, POTASSIUM CHLORIDE, SODIUM LACTATE AND CALCIUM CHLORIDE: 600; 310; 30; 20 INJECTION, SOLUTION INTRAVENOUS at 05:49

## 2022-01-23 RX ADMIN — INSULIN ASPART 4 UNITS: 100 INJECTION, SOLUTION INTRAVENOUS; SUBCUTANEOUS at 02:52

## 2022-01-23 ASSESSMENT — ACTIVITIES OF DAILY LIVING (ADL)
ADLS_ACUITY_SCORE: 9
ADLS_ACUITY_SCORE: 5
WALKING_OR_CLIMBING_STAIRS_DIFFICULTY: NO
HEARING_DIFFICULTY_OR_DEAF: NO
ADLS_ACUITY_SCORE: 9
ADLS_ACUITY_SCORE: 5
DEPENDENT_IADLS:: INDEPENDENT
FALL_HISTORY_WITHIN_LAST_SIX_MONTHS: NO
ADLS_ACUITY_SCORE: 9
ADLS_ACUITY_SCORE: 9
DRESSING/BATHING_DIFFICULTY: NO
CONCENTRATING,_REMEMBERING_OR_MAKING_DECISIONS_DIFFICULTY: NO
ADLS_ACUITY_SCORE: 9
CONCENTRATING,_REMEMBERING_OR_MAKING_DECISIONS_DIFFICULTY: OTHER (SEE COMMENTS)
DIFFICULTY_EATING/SWALLOWING: NO
WALKING_OR_CLIMBING_STAIRS_DIFFICULTY: NO
DIFFICULTY_EATING/SWALLOWING: NO
ADLS_ACUITY_SCORE: 9
ADLS_ACUITY_SCORE: 9
WEAR_GLASSES_OR_BLIND: NO
FALL_HISTORY_WITHIN_LAST_SIX_MONTHS: NO
HEARING_DIFFICULTY_OR_DEAF: NO
ADLS_ACUITY_SCORE: 9
WEAR_GLASSES_OR_BLIND: NO
ADLS_ACUITY_SCORE: 5
DRESSING/BATHING_DIFFICULTY: NO
ADLS_ACUITY_SCORE: 5
DIFFICULTY_COMMUNICATING: NO
DOING_ERRANDS_INDEPENDENTLY_DIFFICULTY: NO
ADLS_ACUITY_SCORE: 9
TOILETING_ISSUES: NO
ADLS_ACUITY_SCORE: 9
ADLS_ACUITY_SCORE: 5
DOING_ERRANDS_INDEPENDENTLY_DIFFICULTY: NO
TOILETING_ISSUES: NO
ADLS_ACUITY_SCORE: 9
ADLS_ACUITY_SCORE: 5
ADLS_ACUITY_SCORE: 5
DIFFICULTY_COMMUNICATING: NO
ADLS_ACUITY_SCORE: 9
PATIENT_/_FAMILY_COMMUNICATION_STYLE: SPOKEN LANGUAGE (ENGLISH OR BILINGUAL)
ADLS_ACUITY_SCORE: 5
ADLS_ACUITY_SCORE: 9
ADLS_ACUITY_SCORE: 9
PATIENT_/_FAMILY_COMMUNICATION_STYLE: SPOKEN LANGUAGE (ENGLISH OR BILINGUAL)

## 2022-01-23 ASSESSMENT — MIFFLIN-ST. JEOR: SCORE: 1453.15
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
## 2022-01-23 NOTE — PROGRESS NOTES
General Surgery Progress Note    Subjective/Interval History:  NG tube out overnight and patient not wanting it replaced. Endorses minimal abdominal pain, mainly on the right side. Passing flatus, no BM. No recurrent nausea/emesis.    Objective:  Temp:  [98.1  F (36.7  C)-98.2  F (36.8  C)] 98.2  F (36.8  C)  Pulse:  [] 91  Resp:  [15-18] 18  BP: (116-141)/(71-96) 117/80  SpO2:  [96 %-100 %] 97 %    General appearance: in NAD, resting comfortably in bed  Pulm: Non-labored breathing  Abd: Soft, mildly TTP in RLQ, non-distended.  Skin: warm and well-perfused.     Assessment/Plan:   Andrew Lockwood is a 54 year old male with PMH of AIDS, GERD, and T2DM who presents with recurrent SBOs (3 admissions in the last month). He had a NG placed for a short period to provide some decompression. The NG tube has subsequently been removed. Pain improved and he is now passing flatus.  - Will get AXR to evaluate small bowel distension. Keep NPO for now but if AXR appears improved and he continues to clinically improve, can advance to CLD today.  - IVF, optimize lytes  - Ambulate, up ad lexi  - Given the frequent recurrences of SBOs, we did discuss and offer a diagnostic laparoscopy. Patient is understandably apprehensive to proceed with surgical intervention due to his social situation and concern about potential complications. Will therefore continue to proceed with non-operative management at this time.    Patient seen and discussed with Dr. Yang.    Jerilyn Kelsey MD  General Surgery, PGY-6  Pg [phone]

## 2022-01-23 NOTE — PROGRESS NOTES
Physician Attestation   I, Maicol Clements, was present with the medical/KASS[employer] student who participated in the service and in the documentation of the note.  I have verified the history and personally performed the physical exam and medical decision making.  I agree with the assessment and plan of care as documented in the note.      I personally reviewed vital signs, medications, labs and imaging.    Patient with HIV admitted with recurrent partial SBO.  NGT for decompression overnight was removed this am and he has tolerated well without N/V/abd pain.  + flatus.  No BM.  Will adat to clears and later tonight to full liquid if tolerates.  Will resume DM meds at half usual dose as resumes diet.    Maicol Clements MD  Date of Service (when I saw the patient): 01/23/22    [facility]    Progress Note - Medicine Service, [facility] TEAM 1       Date of Admission:  1/22/2022    Assessment & Plan            Andrew Lockwood is a 54 year old male with HIV, on Biktarvy, T2DM, and recurrent SBO admitted 1/22 after he was found to have a partial SBO on CT. Recent COVID-19 infection (+ on 1/4/22)    Changes today  - Per surgery, conservative management of SBO  - Clear liquids, ADAT to full liquids tonight  - Discontinued insulin glargine  - Started insulin determir at half PTA dose    # Abdominal Pain  # Small Bowel Obstruction  History of recurrent SBO (last 12/24, with 3 total admissions in December) with CT AP on admission showing distended stomach and dilated small bowel loops, suggestive of at least partial SBO. Lactic acid 2.4. CRP 24. ALT/AST and lipase wnl. S/p NG tube for decompression with 1L output overnight 1/22.  NGT discontinued and no vomiting or nausea. Endorsing flatus and denying abdominal pain today.   - Surgery consulting, proceeding with conservative management   -Surgery did offer a diagnostic lap given hx of recurrent SBO, but patient is resistant about  surgery out of concern for managing potential complications in his current lack of stable housing  - AXR today, returned with nonobstructive bowel gas pattern  - Clear liquids, ADAT to full liquids tonight  - Discontinue maintenance fluids in the setting of resumed PO intake  - Dilaudid 0.5mg IV Q4h PRN  - Acetaminophen 650mg PO Q6hr PRN     # Cholelithiasis  CT AP on admission showed cholelithiasis without evidence of cholecystitis  - Continue to monitor    # Homeless  Patient reports that he lives in shelters. Has  and .   - Social work consulted    Chronic issues not specifically addressed during this admission  # DMII  Presenting glucose 431 without aniongap or suggestion of metabolic derangement. HbA1c 11.5. PTA regimen is detemir 18 U BID and Novolog 10 U TID w/ meals.   - Discontinued insulin glargine  - Started insulin detemir 9 units BID   - Moderate intensity sliding scale insulin   - Q4hr glucose checks    - Hypoglycemia protocol     # HIV  HIV positive, on Biktarvy. Absolute CD4 519 (8/21). Patient reports he ran out of [place] a couple of days prior to this admission.    - Continue PTA Biktarvy     Diet: Clear Liquid Diet    DVT Prophylaxis: Enoxaparin (Lovenox) SQ  Potts Catheter: Not present  Fluids: None  Central Lines: None  Cardiac Monitoring: None  Code Status: Full Code      Disposition Plan   Expected Discharge: 1/25-1/26  Anticipated discharge location:  Awaiting care coordination huddle   Delays: Further cares for SBO       The patient's care was discussed with the Attending Physician, Dr. Prince Clements.    Noemi Domingo  Medical Student  Medicine Service, [facility] TEAM 1  [employer]        Clinically Significant Risk Factors Present on Admission   Homeless  HIV positive  Hx of recurrent SBO with multiple recent admissions  T2DM, HbA1c 11.5  ______________________________________________________________________    Interval  History   NG tube removed overnight.     No abdominal pain this morning. No bloating or belching. Endorses flatus, but no BM. Tired after not having slept for the past couple of nights. Reports he wants to avoid surgery out of concern for having to manage possible complications given he is unhoused. Amenable to conservative management of SBO.     Later in afternoon, still not having abdominal pain. Tolerating clear liquids without nausea. Continued flatus.     Data reviewed today: I reviewed all medications, new labs and imaging results over the last 24 hours. I personally reviewed the abdominal x-ray image(s) showing non-obstructive bowel gas pattern..    Physical Exam   Vital Signs: Temp: 98  F (36.7  C) Temp src: Oral BP: 121/81 Pulse: 89   Resp: 18 SpO2: 98 % O2 Device: None (Room air)    Weight: 145 lbs 0 oz     General: Tired appearing man not in acute distress, attempting to rest in bed.   HEENT: MMM. External ears normal in appearance without otorrhea. No rhinorrhea. Nares patent. Oropharynx wnl without erythema. EOMI. Sclera anicteric.   Neck: midline, supple.   CV: RRR, no MRG. No cyanosis appreciated.   Respiratory: No increased WOB. CTAB.   Abdominal: Normal bowel. Abdomen soft, non-distended and non-tender.   : deferred  Skin: Warm and dry without pallor or jaundice. Scattered hyperpigmented lesions on trunk and extremities.   Ext: WWP, atraumatic.   Neuro: Alert and oriented x3 without gross focal deficit  Psych: Conversation limited due to patient being tired. Thought process linear, speech nonpressured    Data   Recent Labs   Lab 01/23/22  1224 01/23/22  1135 01/23/22  0659 01/23/22  0600 01/22/22  2236 01/22/22  1640   WBC  --   --   --  8.4  --  10.1   HGB  --   --   --  12.6*  --  13.7   MCV  --   --   --  85  --  86   PLT  --   --   --  342  --  358   INR  --   --   --   --   --  1.04   NA  --   --   --  138  --  133   POTASSIUM  --  3.7  --  3.7  --  3.8   CHLORIDE  --   --   --  104  --  104    CO2  --   --   --  26  --  25   BUN  --   --   --  12  --  13   CR  --   --   --  0.67  --  0.68   ANIONGAP  --   --   --  8  --  4   LINDA  --   --   --  8.8  --  9.2   *  --  233* 272*   < > 431*   ALBUMIN  --   --   --  2.7*  --  3.1*   PROTTOTAL  --   --   --  6.7*  --  7.6   BILITOTAL  --   --   --  0.5  --  0.6   ALKPHOS  --   --   --  111  --  123   ALT  --   --   --  15  --  17   AST  --   --   --  10  --  9   LIPASE  --   --   --   --   --  172    < > = values in this interval not displayed.      Spine appears normal, range of motion is not limited, no muscle or joint tenderness

## 2022-01-23 NOTE — PLAN OF CARE
Patient admitted to 7A from ED at 1500.  VSS. Diabetic with ac/hs BGs and sliding scale insulin.  Per ED lantus dose not given this AM as Patient was NPO. Here with small bowel obstruction.  History of multiple small bowel obstructions in past and recently.  Had NGT in place today that per patient accidentally removed.  NGT left out as patient has been passing gas and has positive bowel sounds now.  Written for and tolerating clear diet.  Asking when he can have crackers.  Up independently in room.  Using urinal.  Last BM 1/21.  Resting comfortably at this time. Continue to monitor/support.

## 2022-01-23 NOTE — PROGRESS NOTES
"Writer paged Dr. Lechuga as patient states, \" they removed my NG.\" Patient also requesting popsicles.   Per MD NG to be reinserted with LIS, pt refused.   Continuing to monitor blood glucoses q 4 hours. Patient rates pain 9/10, dilaudid 0.5 mg IV given pain now 6/10. IV fluids LR restarted at 125 ml/hr.   "

## 2022-01-23 NOTE — CONSULTS
Care Management Initial Consult    General Information  Assessment completed with: Patient,VM-chart review,    Type of CM/SW Visit: Initial Assessment    Primary Care Provider verified and updated as needed: No   Readmission within the last 30 days: unable to assess      Reason for Consult: discharge planning,housing concerns/homeless  Advance Care Planning:    Pt does not have one       Communication Assessment  Patient's communication style: spoken language (English or Bilingual)    Hearing Difficulty or Deaf: no   Wear Glasses or Blind: no    Cognitive  Cognitive/Neuro/Behavioral: WDL                      Living Environment:   People in home: other (see comments) (Shelter)     Current living Arrangements: shelter      Able to return to prior arrangements: other (see comments)  Living Arrangement Comments: TBD if pt can return to shelter at discharge    Family/Social Support:  Care provided by: self  Provides care for: no one     Other (specify) (Declined to answer)          Description of Support System:  (Unable to assess)    Support Assessment: Other (see comments) (Unable to assess)    Current Resources:   Patient receiving home care services: No     Community Resources: Other (see comment) (HIV CM Manager)  Equipment currently used at home: none  Supplies currently used at home:      Employment/Financial:  Employment Status: disabled        Financial Concerns: No concerns identified   Referral to Financial Counselor: No       Lifestyle & Psychosocial Needs:  Social Determinants of Health     Tobacco Use: High Risk     Smoking Tobacco Use: Current Every Day Smoker     Smokeless Tobacco Use: Former User   Alcohol Use: Unknown     Frequency of Alcohol Consumption: Not on file     Average Number of Drinks: Patient refused     Frequency of Binge Drinking: Not on file   Financial Resource Strain: Not on file   Food Insecurity: Not on file   Transportation Needs: Not on file   Physical Activity: Not on file    Stress: Not on file   Social Connections: Not on file   Intimate Partner Violence: Not on file   Depression: Not at risk     PHQ-2 Score: 0   Housing Stability: Not on file       Functional Status:  Prior to admission patient needed assistance:   Dependent ADLs:: Independent  Dependent IADLs:: Independent  Assesssment of Functional Status: At functional baseline    Mental Health Status:          Chemical Dependency Status:                Values/Beliefs:  Spiritual, Cultural Beliefs, Sabianist Practices, Values that affect care:                 Additional Information:  Writer met with pt. Writer introduced self, discussed role and went over writer's availability. Pt was laying on his stomach away from writer for assessment and declined to answer some of writer's questions. Pt confirmed he is working with his HIV CM to get him into stable housing and that he does not need further assistance with housing at this time. Pt reports he plans to return to shelter at discharge. Per chart, it appears pt will likely need bus tokens to assist him with returning to the shelter at discharge. Social work will continue to follow and provide assistance to ensure a safe and timely discharge.         ________________    RADHA Lehman, [facility]  ED/Observation   [facility]  Phone: [phone]  Pager: [phone]  Fax: [phone]    On-call pager, [phone], 4:00pm to midnight

## 2022-01-23 NOTE — CONSULTS
"[facility] Surgery Consultation    Andrew Lockwood MRN# [id number]   Age: 54 year old YOB: 1967     Date of Admission:  1/22/2022    Date of Consult:   1/22/22    Reason for consult: SBO        Requesting service: ED, Dr. Cobos                   Assessment and Plan:   Assessment:   Mr. Lockwood is a 54yr old male with history of recurrent SBOs who presented to the ED and was found to have CT scan findings consistent with SBO. He feels his symptoms are similar to SBOs he has had in the past. He is afebrile and hemodynamically stable, no leukocytosis.  Abdominal exam significant for diffuse tenderness, non peritoneal. Will trial conservative management of SBO.         Plan:   Agree w/ planned admission to medicine  Minimize narcotics  NPO, IVF  NGT to LIS  Optimize electrolytes (K>4, Ph>3, Mg>2)   Ambulation   Consider GG challenge tomorrow   Surgery will follow. Please page on call resident if concerns regarding changes in abdominal exam or worsening pain.        Discussed with chief resident, Dr. Kelsey, and staff, Dr. Yang.     Dawn Almanza MD  General Surgery, PGY3  [phone]              Chief Complaint:   \"Obstruction\"          History of Present Illness:   Mr. Lockwood is a 54 yr old male with PMHx of HIV, CNS toxoplasmosis, T2D, seizures, GERD, HLD, h/o lap appendectomy, diagnostic laparoscopy x 2 and recurrent SBO (last admitted 12/12/21) who presents with concern for bowel obstruction. Abdominal pain started today and is diffuse. Last BM @ 4am, has not been passing gas. Denies fevers, chills, chest pain or SOB. Feels similar to prior bowel obstructions.     Has had 2 prior admission in the last 30 days for similar symptoms, managed non operatively.     In the ED, he is afebrile and hemodynamically stable. Labs significant no leukocytosis, hgb wnl. Mildly elevated lactic at 2.4 and CRP 34. CT scan findings of transition point in the right mid abdomen, dilated small bowel, consistent with " SBO.            Past Medical History:     Past Medical History:   Diagnosis Date     AIDS (H)      Allergic rhinitis due to other allergen     DNS     Anal dysplasia      Chronic abdominal pain      CNS toxoplasmosis (H)      Diabetes type 2, controlled (H)      GERD (gastroesophageal reflux disease)      History of seizure      History of substance abuse (H)      HIV (human immunodeficiency virus infection) (H)      HLD (hyperlipidemia)      Lung nodules      Periungual wart      PTSD (post-traumatic stress disorder)      Sleep apnea     doesn't use CPAP             Past Surgical History:     Past Surgical History:   Procedure Laterality Date     ANOSCOPY N/A 9/9/2020    Procedure: Exam Under Anesthesia, ANOSCOPY, fulgeration of rectal fissures with Rectal Biopsies;  Surgeon: Thanh Lundberg MD;  Location: [facility] OR     COLONOSCOPY Left 1/22/2016    Procedure: COMBINED COLONOSCOPY, SINGLE OR MULTIPLE BIOPSY/POLYPECTOMY BY BIOPSY;  Surgeon: Clark Saini MD;  Location: [facility] GI     HC EXPLORE UNDESC TESTIS,INGUIN/SCROTAL       LAPAROSCOPIC APPENDECTOMY N/A 1/31/2018    Procedure: LAPAROSCOPIC APPENDECTOMY;  LAPAROSCOPIC APPENDECTOMY;  Surgeon: Dawn Holt MD;  Location: [facility] OR     LAPAROSCOPY DIAGNOSTIC (GENERAL) N/A 7/26/2016    Procedure: LAPAROSCOPY DIAGNOSTIC (GENERAL);  Surgeon: Susannah Arriaga MD;  Location: [facility] OR     LAPAROSCOPY DIAGNOSTIC (GENERAL) N/A 4/16/2018    Procedure: LAPAROSCOPY DIAGNOSTIC (GENERAL);  Diagnostic laparoscopy and lysis of adhesions;  Surgeon: Prince Dowling MD;  Location: [facility] OR     OPTICAL TRACKING SYSTEM CRANIOTOMY, EXCISE TUMOR, COMBINED Left 4/10/2015    Procedure: COMBINED OPTICAL TRACKING SYSTEM CRANIOTOMY, EXCISE TUMOR;  Surgeon: Mirlande Colmenares MD;  Location: [facility] OR     REPAIR GAMEKEEPER'S THUMB Right 12/2/2016    Procedure: REPAIR LIGAMENT ULNAR COLLATERAL THUMB (GAMEKEEPER'S);  Surgeon: Evin Zamorano MD;  Location:  OR     [facility]  "NONSPECIFIC PROCEDURE      right forearm fracture             Social History:     Social History     Tobacco Use     Smoking status: Current Every Day Smoker     Packs/day: 0.50     Years: 40.00     Pack years: 20.00     Types: Cigarettes     Smokeless tobacco: Former User   Substance Use Topics     Alcohol use: No     Alcohol/week: 0.0 standard drinks     Comment: Last etoh in 2007             Family History:     Family History   Problem Relation Age of Onset     Diabetes Brother      Diabetes Father      Alzheimer Disease Father      Unknown/Adopted Mother      Diabetes Paternal Grandfather      Cancer No family hx of         no skin cancer     Skin Cancer No family hx of         no famiy hx of skin cancer     Glaucoma No family hx of      Macular Degeneration No family hx of                 Allergies:     Allergies   Allergen Reactions     Metformin      Abdominal pain     Tylenol [Acetaminophen] Itching     Dulaglutide Rash     Penicillin V Other (See Comments) and Rash     Diffuse maculopapular rash + feels \"high\", per pt.              Medications:     Current Facility-Administered Medications   Medication     sodium chloride 0.9% infusion     Current Outpatient Medications   Medication Sig     Alcohol Swabs PADS Use to swab the area of the injection or todd as directed Per insurance coverage     bictegravir-emtricitabine-tenofovir (BIKTARVY) -25 MG per tablet Take 1 tablet by mouth daily     blood glucose (NO BRAND SPECIFIED) test strip Use to test blood sugar 4 times daily or as directed. To accompany:whatever is covered     blood glucose calibration (NO BRAND SPECIFIED) solution Used to calibrate the blood glucose monitor as needed and as directed.  To accompany  blood glucose brands per insurance coverage     blood glucose monitoring (NO BRAND SPECIFIED) meter device kit Use as directed. Per insurance coverage.     glucose 40 % (400 mg/mL) gel 15 g every 15 minutes by mouth as needed for low blood " sugar. Oral gel is preferable for conscious and able to swallow patient.     insulin detemir (LEVEMIR PEN) 100 UNIT/ML pen Inject 18 Units Subcutaneous 2 times daily     insulin pen needle (BD SCOTT U/F) 32G X 4 MM miscellaneous USE 3 TO 4 NEEDLES DAILY     NOVOLOG FLEXPEN 100 UNIT/ML soln Inject 10 Units Subcutaneous 3 times daily (with meals)     pantoprazole (PROTONIX) 40 MG EC tablet Take 1 tablet (40 mg) by mouth daily     polyethylene glycol (MIRALAX) 17 GM/Dose powder Take 17 g by mouth daily as needed     SENNA-docusate sodium (SENNA S) 8.6-50 MG tablet Take 1 tablet by mouth 2 times daily as needed (constipation)     Sharps Container MISC Use as directed to dispose of needles, lancets and other sharps. Per Insurance coverage     STATIN NOT PRESCRIBED (INTENTIONAL) Please choose reason not prescribed, below     thin (NO BRAND SPECIFIED) lancets Use with lanceting device. To accompany: Test 4 times daily with whatever is covered               Review of Systems:   Negative except as noted in HPI.          Physical Exam:   All vitals have been reviewed  Temp:  [98.1  F (36.7  C)] 98.1  F (36.7  C)  Pulse:  [] 90  Resp:  [15] 15  BP: (116-138)/(74-91) 126/74  SpO2:  [97 %-100 %] 99 %  No intake or output data in the 24 hours ending 01/22/22 1905  Physical Exam:  Gen: awake, alert, NAD  HEENT: NCAT, EOMI. NGT in place.   CV: non cyanotic   Resp: unlabored breathing on RA  Abd: soft, mildly distended, diffusely tender to palpation. No rebound or guarding.   Ext: no significant edema           Data:   All laboratory data reviewed    Results:  BMP  Recent Labs   Lab 01/22/22  1640 01/20/22  0212     --    POTASSIUM 3.8  --    CHLORIDE 104  --    CO2 25  --    BUN 13  --    CR 0.68  --    * 207*     CBC  Recent Labs   Lab 01/22/22  1640   WBC 10.1   HGB 13.7        LFT  Recent Labs   Lab 01/22/22  1640   AST 9   ALT 17   ALKPHOS 123   BILITOTAL 0.6   ALBUMIN 3.1*   INR 1.04     Recent Labs    Lab 01/22/22  1640 01/20/22  0212   * 207*       Imaging:  CT 1/22  IMPRESSION:   1.  Dilated small bowel loops with a transition point in the right mid  abdomen and decompressed bowel distally,  suggestive of at least  partial small bowel obstruction. No findings of ischemia at this time.  Markedly distended stomach. Can consider NG tube placement.  2.  Cholelithiasis and prostatomegaly.

## 2022-01-24 LAB
ANION GAP SERPL CALCULATED.3IONS-SCNC: 9 MMOL/L (ref 3–14)
BUN SERPL-MCNC: 7 MG/DL (ref 7–30)
CALCIUM SERPL-MCNC: 8.7 MG/DL (ref 8.5–10.1)
CD3 CELLS # BLD: 1103 CELLS/UL (ref 603–2990)
CD3 CELLS NFR BLD: 60 % (ref 49–84)
CD3+CD4+ CELLS # BLD: 371 CELLS/UL (ref 441–2156)
CD3+CD4+ CELLS NFR BLD: 20 % (ref 28–63)
CD3+CD4+ CELLS/CD3+CD8+ CLL BLD: 0.54 % (ref 1.4–2.6)
CD3+CD8+ CELLS # BLD: 689 CELLS/UL (ref 125–1312)
CD3+CD8+ CELLS NFR BLD: 37 % (ref 10–40)
CHLORIDE BLD-SCNC: 102 MMOL/L (ref 94–109)
CO2 SERPL-SCNC: 22 MMOL/L (ref 20–32)
CREAT SERPL-MCNC: 0.7 MG/DL (ref 0.66–1.25)
ERYTHROCYTE [DISTWIDTH] IN BLOOD BY AUTOMATED COUNT: 13.4 % (ref 10–15)
GFR SERPL CREATININE-BSD FRML MDRD: >90 ML/MIN/1.73M2
GLUCOSE BLD-MCNC: 323 MG/DL (ref 70–99)
GLUCOSE BLDC GLUCOMTR-MCNC: 184 MG/DL (ref 70–99)
GLUCOSE BLDC GLUCOMTR-MCNC: 294 MG/DL (ref 70–99)
GLUCOSE BLDC GLUCOMTR-MCNC: 323 MG/DL (ref 70–99)
GLUCOSE BLDC GLUCOMTR-MCNC: 347 MG/DL (ref 70–99)
GLUCOSE BLDC GLUCOMTR-MCNC: 358 MG/DL (ref 70–99)
GLUCOSE BLDC GLUCOMTR-MCNC: 368 MG/DL (ref 70–99)
HCT VFR BLD AUTO: 34.8 % (ref 40–53)
HGB BLD-MCNC: 11.5 G/DL (ref 13.3–17.7)
MAGNESIUM SERPL-MCNC: 2 MG/DL (ref 1.6–2.3)
MCH RBC QN AUTO: 28 PG (ref 26.5–33)
MCHC RBC AUTO-ENTMCNC: 33 G/DL (ref 31.5–36.5)
MCV RBC AUTO: 85 FL (ref 78–100)
PHOSPHATE SERPL-MCNC: 3 MG/DL (ref 2.5–4.5)
PLATELET # BLD AUTO: 293 10E3/UL (ref 150–450)
POTASSIUM BLD-SCNC: 3.4 MMOL/L (ref 3.4–5.3)
RBC # BLD AUTO: 4.11 10E6/UL (ref 4.4–5.9)
SODIUM SERPL-SCNC: 133 MMOL/L (ref 133–144)
T CELL COMMENT: ABNORMAL
WBC # BLD AUTO: 7.4 10E3/UL (ref 4–11)

## 2022-01-24 PROCEDURE — 84100 ASSAY OF PHOSPHORUS: CPT | Performed by: INTERNAL MEDICINE

## 2022-01-24 PROCEDURE — 250N000011 HC RX IP 250 OP 636

## 2022-01-24 PROCEDURE — 85027 COMPLETE CBC AUTOMATED: CPT

## 2022-01-24 PROCEDURE — 999N000157 HC STATISTIC RCP TIME EA 10 MIN

## 2022-01-24 PROCEDURE — 36415 COLL VENOUS BLD VENIPUNCTURE: CPT

## 2022-01-24 PROCEDURE — 120N000011 HC R&B TRANSPLANT UMMC

## 2022-01-24 PROCEDURE — 83735 ASSAY OF MAGNESIUM: CPT

## 2022-01-24 PROCEDURE — 250N000013 HC RX MED GY IP 250 OP 250 PS 637: Performed by: INTERNAL MEDICINE

## 2022-01-24 PROCEDURE — 250N000013 HC RX MED GY IP 250 OP 250 PS 637

## 2022-01-24 PROCEDURE — 80048 BASIC METABOLIC PNL TOTAL CA: CPT

## 2022-01-24 PROCEDURE — 99232 SBSQ HOSP IP/OBS MODERATE 35: CPT | Mod: GC | Performed by: INTERNAL MEDICINE

## 2022-01-24 PROCEDURE — 82565 ASSAY OF CREATININE: CPT

## 2022-01-24 RX ORDER — POTASSIUM CHLORIDE 750 MG/1
40 TABLET, EXTENDED RELEASE ORAL ONCE
Status: COMPLETED | OUTPATIENT
Start: 2022-01-24 | End: 2022-01-24

## 2022-01-24 RX ADMIN — INSULIN ASPART 1 UNITS: 100 INJECTION, SOLUTION INTRAVENOUS; SUBCUTANEOUS at 07:40

## 2022-01-24 RX ADMIN — INSULIN ASPART 4 UNITS: 100 INJECTION, SOLUTION INTRAVENOUS; SUBCUTANEOUS at 11:35

## 2022-01-24 RX ADMIN — PANTOPRAZOLE SODIUM 40 MG: 40 TABLET, DELAYED RELEASE ORAL at 07:36

## 2022-01-24 RX ADMIN — ENOXAPARIN SODIUM 40 MG: 40 INJECTION SUBCUTANEOUS at 20:45

## 2022-01-24 RX ADMIN — POTASSIUM CHLORIDE 40 MEQ: 750 TABLET, EXTENDED RELEASE ORAL at 20:46

## 2022-01-24 RX ADMIN — Medication 5 MG: at 01:47

## 2022-01-24 RX ADMIN — INSULIN DETEMIR 9 UNITS: 100 INJECTION, SOLUTION SUBCUTANEOUS at 07:37

## 2022-01-24 RX ADMIN — BICTEGRAVIR SODIUM, EMTRICITABINE, AND TENOFOVIR ALAFENAMIDE FUMARATE 1 TABLET: 50; 200; 25 TABLET ORAL at 07:37

## 2022-01-24 RX ADMIN — INSULIN ASPART 5 UNITS: 100 INJECTION, SOLUTION INTRAVENOUS; SUBCUTANEOUS at 15:57

## 2022-01-24 RX ADMIN — INSULIN ASPART 4 UNITS: 100 INJECTION, SOLUTION INTRAVENOUS; SUBCUTANEOUS at 04:14

## 2022-01-24 RX ADMIN — INSULIN ASPART 5 UNITS: 100 INJECTION, SOLUTION INTRAVENOUS; SUBCUTANEOUS at 20:45

## 2022-01-24 ASSESSMENT — ACTIVITIES OF DAILY LIVING (ADL)
ADLS_ACUITY_SCORE: 5

## 2022-01-24 ASSESSMENT — MIFFLIN-ST. JEOR: SCORE: 1450

## 2022-01-24 NOTE — PLAN OF CARE
"VS: /85 (BP Location: Right arm, Patient Position: Fowlers)   Pulse 90   Temp 98  F (36.7  C) (Oral)   Resp 16   Ht 1.626 m (5' 4\")   Wt 71.2 kg (157 lb)   SpO2 98%   BMI 26.95 kg/m        Neuro: alert and oriented X4   Cardio: WNL   Respiratory: RA   GI/: continent of bowel and bladder bowel sounds is active   Skin: skin is intact   Diet:  on Full liquid diet    Labs:   B-358, 0400-323  LDA:  has PIV SL   Mobility:  up ad lexi   Pain: denied pain   PRN medications: was given X1 Melatonin   Changes:   Plan of Care:  Pt was able to sleep most of the night after a PRN Melatonin was given.   "
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
## 2022-01-24 NOTE — PROGRESS NOTES
General Surgery Progress Note  01/24/2022   ------------------------------------------------------------------------------------------------  Subjective: No acute events overnight.  Patient reports feeling well, minimal abdominal pain, no nausea or vomiting.  Passing flatus, no bowel movement yet.  No other symptoms or concerns.    ------------------------------------------------------------------------------------------------  Objective:  Temp:  [97.9  F (36.6  C)-98.3  F (36.8  C)] 98.3  F (36.8  C)  Pulse:  [82-90] 82  Resp:  [16-20] 16  BP: (112-133)/(78-87) 112/78  SpO2:  [97 %-100 %] 100 %    I/O last 3 completed shifts:  In: 225 [P.O.:225]  Out: 875 [Urine:875]      General: alert and oriented, NAD, non-toxic appearing  CV: RRR, warm and well perfused, no LE edema  Pulm: normal rate and work of breathing on RA  Abdomen: Soft, non-tender, slightly-distended  Extremities: no welling or asymmetry. Radial and DP pulses 2+ bilaterally     Labs:  Glucose 323    Imaging:  EXAMINATION:  XR ABDOMEN PORT 1 VIEWS 1/23/2022 10:24 AM      COMPARISON: Abdominal radiograph 1/22/2022.     HISTORY: SBO, eval progression/distension.     TECHNIQUE: Frontal view of the abdomen.     FINDINGS: Enteric tube has been removed. No dilated loops of small or  large bowel. No pneumatosis or portal venous gas.  Bibasilar  opacities, likely atelectasis.                                                                      IMPRESSION: Nonobstructive bowel gas pattern.      =======================================================  Assessment/Plan:   Andrew Lockwood is a 54 year old male with PMH of AIDS, GERD, and T2DM who presents with recurrent SBOs (3 admissions in the last month). He had a NG placed for a short period to provide some decompression. The NG tube has subsequently been removed.  Remains clinically and hemodynamically stable, having antegrade bowel function, labs and exam reassuring.  Abdominal x-ray from 1/23 also improved.   Patient tolerating full liquid diet.  Patient would like to avoid surgical intervention at this time.    - Advance diet as tolerated  -Surgery signing off given resolution of small bowel obstruction and patient preference to avoid surgical intervention     Seen, examined, and discussed with chief resident, who will discuss with staff.    Vic Santoyo MD  General Surgery PGY-1

## 2022-01-24 NOTE — PROVIDER NOTIFICATION
"Patient's peripheral IV was on the floor upon entering room. Patient reported that \"it fell out\". Provider notified and stated that there was no need to replace it at this time.   "

## 2022-01-24 NOTE — PROGRESS NOTES
Brief Social Work Note    Expected Discharge Date:  TBD     Concerns to be Addressed:    Shelter Resources    Additional Information:    0452 SW received call from Cristina Franklin regarding shelter resources for pt. SW provided contact information for [facility] - Ph: [phone]. SW explained that the pt would need to call as they would need to speak with him, and that once a bed was secured, SW could arrange transportation. SW asked provider to tell pt to call prior to 1700 as the phone lines shut down [phone] to assess remaining shelter beds.    SW will continue to follow as needed.    RADHA Reese, [facility]  ED/OBS   [facility]  Phone: [phone]  Pager: [phone]  Fax: [phone]     On-call pager, [phone], 4:00 pm to midnight

## 2022-01-25 VITALS
HEART RATE: 85 BPM | BODY MASS INDEX: 26.72 KG/M2 | HEIGHT: 64 IN | TEMPERATURE: 98.5 F | DIASTOLIC BLOOD PRESSURE: 79 MMHG | OXYGEN SATURATION: 96 % | WEIGHT: 156.53 LBS | RESPIRATION RATE: 18 BRPM | SYSTOLIC BLOOD PRESSURE: 130 MMHG

## 2022-01-25 DIAGNOSIS — E11.65 TYPE 2 DIABETES MELLITUS WITH HYPERGLYCEMIA, WITH LONG-TERM CURRENT USE OF INSULIN (H): ICD-10-CM

## 2022-01-25 DIAGNOSIS — Z79.4 TYPE 2 DIABETES MELLITUS WITH HYPERGLYCEMIA, WITH LONG-TERM CURRENT USE OF INSULIN (H): ICD-10-CM

## 2022-01-25 LAB
CREAT SERPL-MCNC: 0.68 MG/DL (ref 0.66–1.25)
GFR SERPL CREATININE-BSD FRML MDRD: >90 ML/MIN/1.73M2
GLUCOSE BLDC GLUCOMTR-MCNC: 271 MG/DL (ref 70–99)
GLUCOSE BLDC GLUCOMTR-MCNC: 274 MG/DL (ref 70–99)
GLUCOSE BLDC GLUCOMTR-MCNC: 332 MG/DL (ref 70–99)
GLUCOSE BLDC GLUCOMTR-MCNC: 375 MG/DL (ref 70–99)
MAGNESIUM SERPL-MCNC: 2 MG/DL (ref 1.6–2.3)
PHOSPHATE SERPL-MCNC: 2.9 MG/DL (ref 2.5–4.5)
PLATELET # BLD AUTO: 309 10E3/UL (ref 150–450)

## 2022-01-25 PROCEDURE — 83735 ASSAY OF MAGNESIUM: CPT | Performed by: INTERNAL MEDICINE

## 2022-01-25 PROCEDURE — 250N000013 HC RX MED GY IP 250 OP 250 PS 637

## 2022-01-25 PROCEDURE — 250N000012 HC RX MED GY IP 250 OP 636 PS 637

## 2022-01-25 PROCEDURE — 99239 HOSP IP/OBS DSCHRG MGMT >30: CPT | Mod: GC | Performed by: HOSPITALIST

## 2022-01-25 PROCEDURE — 36415 COLL VENOUS BLD VENIPUNCTURE: CPT | Performed by: INTERNAL MEDICINE

## 2022-01-25 PROCEDURE — 85049 AUTOMATED PLATELET COUNT: CPT

## 2022-01-25 PROCEDURE — 84100 ASSAY OF PHOSPHORUS: CPT | Performed by: STUDENT IN AN ORGANIZED HEALTH CARE EDUCATION/TRAINING PROGRAM

## 2022-01-25 RX ADMIN — INSULIN ASPART 5 UNITS: 100 INJECTION, SOLUTION INTRAVENOUS; SUBCUTANEOUS at 00:45

## 2022-01-25 RX ADMIN — BICTEGRAVIR SODIUM, EMTRICITABINE, AND TENOFOVIR ALAFENAMIDE FUMARATE 1 TABLET: 50; 200; 25 TABLET ORAL at 08:21

## 2022-01-25 RX ADMIN — INSULIN ASPART 4 UNITS: 100 INJECTION, SOLUTION INTRAVENOUS; SUBCUTANEOUS at 04:39

## 2022-01-25 RX ADMIN — INSULIN ASPART 3 UNITS: 100 INJECTION, SOLUTION INTRAVENOUS; SUBCUTANEOUS at 08:23

## 2022-01-25 RX ADMIN — PANTOPRAZOLE SODIUM 40 MG: 40 TABLET, DELAYED RELEASE ORAL at 08:21

## 2022-01-25 ASSESSMENT — ACTIVITIES OF DAILY LIVING (ADL)
ADLS_ACUITY_SCORE: 5

## 2022-01-25 ASSESSMENT — MIFFLIN-ST. JEOR: SCORE: 1451

## 2022-01-25 NOTE — PLAN OF CARE
"/73 (BP Location: Right arm)   Pulse 94   Temp 98.6  F (37  C) (Oral)   Resp 18   Ht 1.626 m (5' 4\")   Wt 70.9 kg (156 lb 4.9 oz)   SpO2 98%   BMI 26.83 kg/m        2956-3792  VSS on RA. Q4 blood sugars, 184, 294 and 347. Team aware. Insulin given per sliding scale. Denies pain. Regular diet. No PIV, team aware and okay with this. LBM 1/21. Voiding, saving in urinal. Skin intact. Up ad lexi. See note regarding comments pt made this shift. Social work consult placed. Will continue to monitor and notify team with changes.   "

## 2022-01-25 NOTE — PROGRESS NOTES
SPIRITUAL HEALTH SERVICES  SPIRITUAL ASSESSMENT Progress Note  [facility] ([place] UU U7A     REFERRAL SOURCE: Lead     I visited the pt in room. Pt welcomed the spiritual support. Pt mentioned he is very tire and sleepy. Pt shared that he has no family and he is homeless. Pt mentioned that he might discharged today.I prayed for him may Allah wali him a place to stay and healing,I also provided him with a Islamic prayer booklet.     PLAN: [facility] will always be available.    Yamile Laboyin Resident  Phone: [phone]

## 2022-01-25 NOTE — CONSULTS
"Care Management Follow Up    Length of Stay (days): 3    Expected Discharge Date: 01/25/2022     Concerns to be Addressed: all concerns addressed in this encounter     Patient plan of care discussed at interdisciplinary rounds: Yes    Anticipated Discharge Disposition: Shelter     Anticipated Discharge Services: None  Anticipated Discharge DME: None    Patient/family educated on Medicare website which has current facility and service quality ratings: N/A  Education Provided on the Discharge Plan: yes  Patient/Family in Agreement with the Plan: yes    Referrals Placed by CM/SW: External Care Coordination  Private pay costs discussed: Not applicable    Additional Information:  SW met with patient at bedside to discuss discharge planning. Patient told SW he did not want to return to his previous shelter. Patient would like SW to get in touch with a homeless  that he talks to at the [facility], Briseyda Hammer. He would like her help to get into the [facility]. Patient does not have a number for her. SW offered to reach out to [facility] to get him into a different shelter. Patient became labile with SW after this was stated and said that SW \"did not want to help him\". SW does want to help him but does not have a contact for Briseyda. SW promised to try and get in touch with Briseyda. SW called [facility] to determine if they have a contact for Briseyda Hammer. They do have a contact ([phone]) but she only is at the library on Monday's or Wednesday's from 1-4.    DAYNA called Briseyda and left a message.     ALEXUS Briones CM ([phone]): DAYNA received a call from lawrence Briones's ALEXUS GRAY. DAYNA provided update on discharge plans. She plans to call DAYNA on 1/27 to see if patient has discharged or not.     Patient: DAYNA called patient back and provided an update the call to Briseyda. He is now in agreement to DAYNA calling [facility] to find another shelter bed. "     [facility] ([phone]): They have an overnight bed at [facility].  conference called patient and he does not want to stay there and hung up.     Simon Cadena CM at [facility] (Ph: [phone]): Discussed concerns/difficulties with shelter placement. Simon requests that patient come see him when he leaves the hospital today. Patient knows where to find Simon in the [facility].     Patient: Relayed the message from Simon. Patient will go see him today.     Lissy's Team: Updated about discharge plan.     RADHA Andujar, SW  7A/5C Medicine   Ph: [phone]  Pager: [phone]

## 2022-01-25 NOTE — PLAN OF CARE
"VS: /81 (BP Location: Right arm)   Pulse 91   Temp 98.5  F (36.9  C) (Oral)   Resp 18   Ht 1.626 m (5' 4\")   Wt 70.9 kg (156 lb 4.9 oz)   SpO2 99%   BMI 26.83 kg/m        Neuro: alert and oriented X4   Cardio: WNL   Respiratory:   GI/: RA   Skin: intact   Diet:   Regular and tolerating   Labs: Mg/K+/Phos RN managed K+ replaced yesterday   B-368, 0000-375, 0400-332  LDA: No IV access    Mobility:  Up ad lexi in the room   Pain: denied pain   PRN medications: none   Changes:   Plan of Care:  Has a SW consult for placement, slept most of the night.   "

## 2022-01-26 RX ORDER — INSULIN ASPART 100 [IU]/ML
10 INJECTION, SOLUTION INTRAVENOUS; SUBCUTANEOUS
Qty: 75 ML | Refills: 0 | Status: ON HOLD | OUTPATIENT
Start: 2022-01-26 | End: 2022-02-21
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
## 2022-01-27 ENCOUNTER — TELEPHONE (OUTPATIENT)
Dept: CARE COORDINATION | Facility: CLINIC | Age: 57
End: 2022-01-27
Payer: COMMERCIAL

## 2022-01-27 NOTE — PROGRESS NOTES
SW received a message from Eren ([phone]), patient [employer] . She wanted to know if patient discharged. SW called her back and left a message with his discharge date.     RADHA Andujar, SW  7A/5C Medicine   Ph: [phone]  Pager: [phone]

## 2022-01-31 DIAGNOSIS — B20 HUMAN IMMUNODEFICIENCY VIRUS (HIV) DISEASE (H): Primary | ICD-10-CM

## 2022-02-02 ENCOUNTER — HOSPITAL ENCOUNTER (EMERGENCY)
Facility: CLINIC | Age: 57
Discharge: HOME | End: 2022-02-02
Attending: EMERGENCY MEDICINE | Admitting: EMERGENCY MEDICINE
Payer: COMMERCIAL

## 2022-02-02 VITALS
WEIGHT: 156 LBS | OXYGEN SATURATION: 93 % | HEART RATE: 97 BPM | SYSTOLIC BLOOD PRESSURE: 121 MMHG | RESPIRATION RATE: 16 BRPM | BODY MASS INDEX: 26.78 KG/M2 | DIASTOLIC BLOOD PRESSURE: 69 MMHG | TEMPERATURE: 99.8 F

## 2022-02-02 DIAGNOSIS — T14.8XXA MULTIPLE WOUNDS OF SKIN: ICD-10-CM

## 2022-02-02 PROCEDURE — 99283 EMERGENCY DEPT VISIT LOW MDM: CPT | Performed by: EMERGENCY MEDICINE

## 2022-02-02 PROCEDURE — 99282 EMERGENCY DEPT VISIT SF MDM: CPT | Performed by: EMERGENCY MEDICINE

## 2022-02-02 NOTE — ED TRIAGE NOTES
"Pt. states sore are full of \"pus\" and taking long time to heal and pain increasing.  Sores on both legs and buttocks and feet.  "

## 2022-02-02 NOTE — ED PROVIDER NOTES
ED Provider Note  [facility]      History     Chief Complaint   Patient presents with     Addiction Problem     states has sores on body  wants them looked at     HPI  Andrew Lockwood is a 54 year old male with past medical history of HIV, on Biktarvy, T2DM, and recurrent SBO, homelessness, amongst others, who presents to the ED requesting that I look at the sores on his legs.  He states they have been there for 2 weeks.  He states he is worried that he needs medicine.  He states he also has a couple on his chest/abdomen.  He denies any recent drug use.  Denies any fever, cough, shortness of breath, abdominal pain, nausea, vomiting, diarrhea.  He asks if he can sleep here as the buses are not currently running for him to get back to his homeless shelter.    Past Medical History  Past Medical History:   Diagnosis Date     AIDS (H)      Allergic rhinitis due to other allergen     DNS     Anal dysplasia      Chronic abdominal pain      CNS toxoplasmosis (H)      Diabetes type 2, controlled (H)      GERD (gastroesophageal reflux disease)      History of seizure      History of substance abuse (H)      HIV (human immunodeficiency virus infection) (H)      HLD (hyperlipidemia)      Lung nodules      Periungual wart      PTSD (post-traumatic stress disorder)      Sleep apnea     doesn't use CPAP     Past Surgical History:   Procedure Laterality Date     ANOSCOPY N/A 9/9/2020    Procedure: Exam Under Anesthesia, ANOSCOPY, fulgeration of rectal fissures with Rectal Biopsies;  Surgeon: Thanh Lundberg MD;  Location: [facility] OR     COLONOSCOPY Left 1/22/2016    Procedure: COMBINED COLONOSCOPY, SINGLE OR MULTIPLE BIOPSY/POLYPECTOMY BY BIOPSY;  Surgeon: Clark Saini MD;  Location:  GI     HC EXPLORE UNDESC TESTIS,INGUIN/SCROTAL       LAPAROSCOPIC APPENDECTOMY N/A 1/31/2018    Procedure: LAPAROSCOPIC APPENDECTOMY;  LAPAROSCOPIC APPENDECTOMY;  Surgeon: Dawn Holt MD;  Location:   "OR     LAPAROSCOPY DIAGNOSTIC (GENERAL) N/A 7/26/2016    Procedure: LAPAROSCOPY DIAGNOSTIC (GENERAL);  Surgeon: Susannah Arriaga MD;  [facility]     LAPAROSCOPY DIAGNOSTIC (GENERAL) N/A 4/16/2018    Procedure: LAPAROSCOPY DIAGNOSTIC (GENERAL);  Diagnostic laparoscopy and lysis of adhesions;  Surgeon: Prince Dowling MD;  [facility]     OPTICAL TRACKING SYSTEM CRANIOTOMY, EXCISE TUMOR, COMBINED Left 4/10/2015    Procedure: COMBINED OPTICAL TRACKING SYSTEM CRANIOTOMY, EXCISE TUMOR;  Surgeon: Mirlande Colmenares MD;  [facility]     REPAIR GAMEKEEPER'S THUMB Right 12/2/2016    Procedure: REPAIR LIGAMENT ULNAR COLLATERAL THUMB (GAMEKEEPER'S);  Surgeon: Evin Zamorano MD;  Location:  OR     ZZC NONSPECIFIC PROCEDURE      right forearm fracture     bictegravir-emtricitabine-tenofovir (BIKTARVY) -25 MG per tablet  insulin detemir (LEVEMIR PEN) 100 UNIT/ML pen  NOVOLOG FLEXPEN 100 UNIT/ML soln  pantoprazole (PROTONIX) 40 MG EC tablet  polyethylene glycol (MIRALAX) 17 GM/Dose powder  SENNA-docusate sodium (SENNA S) 8.6-50 MG tablet  Sharps Container MISC  Alcohol Swabs PADS  blood glucose (NO BRAND SPECIFIED) test strip  blood glucose calibration (NO BRAND SPECIFIED) solution  blood glucose monitoring (NO BRAND SPECIFIED) meter device kit  glucose 40 % (400 mg/mL) gel  insulin pen needle (BD SCOTT U/F) 32G X 4 MM miscellaneous  STATIN NOT PRESCRIBED (INTENTIONAL)  thin (NO BRAND SPECIFIED) lancets      Allergies   Allergen Reactions     Metformin      Abdominal pain     Tylenol [Acetaminophen] Itching     Dulaglutide Rash     Insulin Lispro Rash     Patient reported     Penicillin V Other (See Comments) and Rash     Diffuse maculopapular rash + feels \"high\", per pt.      Family History  Family History   Problem Relation Age of Onset     Diabetes Brother      Diabetes Father      Alzheimer Disease Father      Unknown/Adopted Mother      Diabetes Paternal Grandfather      Cancer No family " hx of         no skin cancer     Skin Cancer No family hx of         no famiy hx of skin cancer     Glaucoma No family hx of      Macular Degeneration No family hx of      Social History   Social History     Tobacco Use     Smoking status: Current Every Day Smoker     Packs/day: 0.25     Years: 40.00     Pack years: 10.00     Types: Cigarettes     Smokeless tobacco: Former User   Substance Use Topics     Alcohol use: No     Alcohol/week: 0.0 standard drinks     Comment: Last etoh in 2007     Drug use: Not Currently     Types: Marijuana, Methamphetamines      Past medical history, past surgical history, medications, allergies, family history, and social history were reviewed with the patient. No additional pertinent items.       Review of Systems  A complete review of systems was performed with pertinent positives and negatives noted in the HPI, and all other systems negative.    Physical Exam   BP: (!) 150/96  Pulse: 116  Temp: 99.8  F (37.7  C)  Resp: 16  Weight: 70.8 kg (156 lb)  SpO2: 99 %  Physical Exam  Constitutional:       General: He is not in acute distress.     Appearance: He is not diaphoretic.   HENT:      Head: Atraumatic.   Eyes:      General: No scleral icterus.  Cardiovascular:      Rate and Rhythm: Tachycardia present.      Heart sounds: Normal heart sounds.      Comments: HR approx 100  Pulmonary:      Effort: No respiratory distress.      Breath sounds: Normal breath sounds.   Abdominal:      Palpations: Abdomen is soft.      Tenderness: There is no abdominal tenderness.   Musculoskeletal:         General: No tenderness.   Skin:     General: Skin is warm.      Comments: Multiple scabbed over patches with small amt of surrounding erythema                   ED Course      Procedures                     No results found for any visits on 02/02/22.  Medications - No data to display     Assessments & Plan (with Medical Decision Making)   Not sure why he has these patches, but it seems like they are  healing.  I doubt any real infection, we will put bacitracin on one of them.  He does report he has got a follow-up clinic appointment this week.  He really is not presenting with any other complaints.  I will give him the phone number for dermatology make appointment if he feels he needs to.  Given that it is the middle the night I do wonder if there is some secondary gain for being here.  I will let him sleep here until the buses start running, then we will discharge him.  He is encouraged to follow-up as planned.  No systemic antibiotics needed.    He is encouraged to follow-up with dermatology as well.  Verbalizes understanding and is agreeable to the plan.    Dictation Disclaimer: Some of this Note has been completed with voice-recognition dictation software. Although errors are generally corrected real-time, there is the potential for a rare error to be present in the completed chart.      I have reviewed the nursing notes. I have reviewed the findings, diagnosis, plan and need for follow up with the patient.    Discharge Medication List as of 2/2/2022  4:24 AM          Final diagnoses:   Multiple wounds of skin       --  Dawn Yan  [facility] EMERGENCY DEPARTMENT  2/2/2022     Dawn Yan MD  02/02/22 0513

## 2022-02-02 NOTE — DISCHARGE INSTRUCTIONS
Please make an appointment to follow up with Dermatology Clinic (phone: [phone]) within 2 weeks. Return with any concerns.

## 2022-02-05 ENCOUNTER — HOSPITAL ENCOUNTER (EMERGENCY)
Facility: CLINIC | Age: 57
Discharge: LEFT BEFORE DISCHARGE | End: 2022-02-05
Payer: COMMERCIAL

## 2022-02-05 RX ORDER — ONDANSETRON 2 MG/ML
8 INJECTION INTRAMUSCULAR; INTRAVENOUS ONCE
Status: DISCONTINUED | OUTPATIENT
Start: 2022-02-05 | End: 2022-02-05 | Stop reason: HOSPADM

## 2022-02-05 NOTE — ED TRIAGE NOTES
Pt comes in due to abdominal pain, pt thinks he may have bowel obstruction again, h/o frequent bowel obstructions. 4mg Zofran given en route. .
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
## 2022-02-18 ENCOUNTER — HOSPITAL ENCOUNTER (OUTPATIENT)
Facility: CLINIC | Age: 57
Setting detail: OBSERVATION
Discharge: HOME | End: 2022-02-21
Attending: EMERGENCY MEDICINE | Admitting: PHYSICIAN ASSISTANT
Payer: COMMERCIAL

## 2022-02-18 ENCOUNTER — APPOINTMENT (OUTPATIENT)
Dept: GENERAL RADIOLOGY | Facility: CLINIC | Age: 57
End: 2022-02-18
Attending: EMERGENCY MEDICINE
Payer: COMMERCIAL

## 2022-02-18 ENCOUNTER — APPOINTMENT (OUTPATIENT)
Dept: ULTRASOUND IMAGING | Facility: CLINIC | Age: 57
End: 2022-02-18
Attending: EMERGENCY MEDICINE
Payer: COMMERCIAL

## 2022-02-18 DIAGNOSIS — S52.601D CLOSED FRACTURE OF DISTAL ENDS OF RIGHT RADIUS AND ULNA WITH ROUTINE HEALING, SUBSEQUENT ENCOUNTER: ICD-10-CM

## 2022-02-18 DIAGNOSIS — E11.65 TYPE 2 DIABETES MELLITUS WITH HYPERGLYCEMIA, WITH LONG-TERM CURRENT USE OF INSULIN (H): ICD-10-CM

## 2022-02-18 DIAGNOSIS — B20 HUMAN IMMUNODEFICIENCY VIRUS (HIV) DISEASE (H): Primary | ICD-10-CM

## 2022-02-18 DIAGNOSIS — Z20.822 LAB TEST NEGATIVE FOR COVID-19 VIRUS: ICD-10-CM

## 2022-02-18 DIAGNOSIS — K56.609 SBO (SMALL BOWEL OBSTRUCTION) (H): ICD-10-CM

## 2022-02-18 DIAGNOSIS — R10.84 GENERALIZED ABDOMINAL PAIN: ICD-10-CM

## 2022-02-18 DIAGNOSIS — R10.13 ABDOMINAL PAIN, EPIGASTRIC: ICD-10-CM

## 2022-02-18 DIAGNOSIS — S52.501D CLOSED FRACTURE OF DISTAL ENDS OF RIGHT RADIUS AND ULNA WITH ROUTINE HEALING, SUBSEQUENT ENCOUNTER: ICD-10-CM

## 2022-02-18 DIAGNOSIS — B20 HUMAN IMMUNODEFICIENCY VIRUS (HIV) DISEASE (H): ICD-10-CM

## 2022-02-18 DIAGNOSIS — Z79.4 TYPE 2 DIABETES MELLITUS WITH HYPERGLYCEMIA, WITH LONG-TERM CURRENT USE OF INSULIN (H): ICD-10-CM

## 2022-02-18 LAB
ALBUMIN SERPL-MCNC: 3.4 G/DL (ref 3.4–5)
ALBUMIN UR-MCNC: 20 MG/DL
ALP SERPL-CCNC: 112 U/L (ref 40–150)
ALT SERPL W P-5'-P-CCNC: 17 U/L (ref 0–70)
ANION GAP SERPL CALCULATED.3IONS-SCNC: 7 MMOL/L (ref 3–14)
APPEARANCE UR: CLEAR
AST SERPL W P-5'-P-CCNC: 7 U/L (ref 0–45)
ATRIAL RATE - MUSE: 91 BPM
BASOPHILS # BLD AUTO: 0.1 10E3/UL (ref 0–0.2)
BASOPHILS NFR BLD AUTO: 1 %
BILIRUB SERPL-MCNC: 0.7 MG/DL (ref 0.2–1.3)
BILIRUB UR QL STRIP: NEGATIVE
BUN SERPL-MCNC: 18 MG/DL (ref 7–30)
CALCIUM SERPL-MCNC: 9.6 MG/DL (ref 8.5–10.1)
CHLORIDE BLD-SCNC: 100 MMOL/L (ref 94–109)
CO2 SERPL-SCNC: 24 MMOL/L (ref 20–32)
COLOR UR AUTO: ABNORMAL
CREAT SERPL-MCNC: 0.72 MG/DL (ref 0.66–1.25)
CRP SERPL-MCNC: 4.1 MG/L (ref 0–8)
DIASTOLIC BLOOD PRESSURE - MUSE: NORMAL MMHG
EOSINOPHIL # BLD AUTO: 0.1 10E3/UL (ref 0–0.7)
EOSINOPHIL NFR BLD AUTO: 1 %
ERYTHROCYTE [DISTWIDTH] IN BLOOD BY AUTOMATED COUNT: 14 % (ref 10–15)
GFR SERPL CREATININE-BSD FRML MDRD: >90 ML/MIN/1.73M2
GLUCOSE BLD-MCNC: 357 MG/DL (ref 70–99)
GLUCOSE UR STRIP-MCNC: >=1000 MG/DL
HBA1C MFR BLD: 11.2 % (ref 0–5.6)
HCT VFR BLD AUTO: 39.1 % (ref 40–53)
HGB BLD-MCNC: 13.3 G/DL (ref 13.3–17.7)
HGB UR QL STRIP: NEGATIVE
HOLD SPECIMEN: NORMAL
IMM GRANULOCYTES # BLD: 0.1 10E3/UL
IMM GRANULOCYTES NFR BLD: 1 %
INTERPRETATION ECG - MUSE: NORMAL
KETONES UR STRIP-MCNC: NEGATIVE MG/DL
LACTATE SERPL-SCNC: 1.1 MMOL/L (ref 0.7–2)
LEUKOCYTE ESTERASE UR QL STRIP: NEGATIVE
LIPASE SERPL-CCNC: 191 U/L (ref 73–393)
LYMPHOCYTES # BLD AUTO: 2.4 10E3/UL (ref 0.8–5.3)
LYMPHOCYTES NFR BLD AUTO: 23 %
MCH RBC QN AUTO: 27.9 PG (ref 26.5–33)
MCHC RBC AUTO-ENTMCNC: 34 G/DL (ref 31.5–36.5)
MCV RBC AUTO: 82 FL (ref 78–100)
MONOCYTES # BLD AUTO: 0.6 10E3/UL (ref 0–1.3)
MONOCYTES NFR BLD AUTO: 5 %
NEUTROPHILS # BLD AUTO: 7.2 10E3/UL (ref 1.6–8.3)
NEUTROPHILS NFR BLD AUTO: 69 %
NITRATE UR QL: NEGATIVE
NRBC # BLD AUTO: 0 10E3/UL
NRBC BLD AUTO-RTO: 0 /100
P AXIS - MUSE: 33 DEGREES
PH UR STRIP: 6 [PH] (ref 5–7)
PLATELET # BLD AUTO: 458 10E3/UL (ref 150–450)
POTASSIUM BLD-SCNC: 3.9 MMOL/L (ref 3.4–5.3)
PR INTERVAL - MUSE: 126 MS
PROT SERPL-MCNC: 8.3 G/DL (ref 6.8–8.8)
QRS DURATION - MUSE: 88 MS
QT - MUSE: 360 MS
QTC - MUSE: 442 MS
R AXIS - MUSE: 33 DEGREES
RBC # BLD AUTO: 4.77 10E6/UL (ref 4.4–5.9)
RBC URINE: 0 /HPF
SODIUM SERPL-SCNC: 131 MMOL/L (ref 133–144)
SP GR UR STRIP: 1.01 (ref 1–1.03)
SYSTOLIC BLOOD PRESSURE - MUSE: NORMAL MMHG
T AXIS - MUSE: 32 DEGREES
TROPONIN I SERPL HS-MCNC: <3 NG/L
UROBILINOGEN UR STRIP-MCNC: NORMAL MG/DL
VENTRICULAR RATE- MUSE: 91 BPM
WBC # BLD AUTO: 10.4 10E3/UL (ref 4–11)
WBC URINE: 2 /HPF

## 2022-02-18 PROCEDURE — 80053 COMPREHEN METABOLIC PANEL: CPT | Performed by: PHYSICIAN ASSISTANT

## 2022-02-18 PROCEDURE — 99151 MOD SED SAME PHYS/QHP <5 YRS: CPT | Performed by: EMERGENCY MEDICINE

## 2022-02-18 PROCEDURE — 93010 ELECTROCARDIOGRAM REPORT: CPT | Performed by: EMERGENCY MEDICINE

## 2022-02-18 PROCEDURE — 86140 C-REACTIVE PROTEIN: CPT | Performed by: PHYSICIAN ASSISTANT

## 2022-02-18 PROCEDURE — 74019 RADEX ABDOMEN 2 VIEWS: CPT | Mod: 26 | Performed by: RADIOLOGY

## 2022-02-18 PROCEDURE — 250N000009 HC RX 250: Performed by: EMERGENCY MEDICINE

## 2022-02-18 PROCEDURE — 96375 TX/PRO/DX INJ NEW DRUG ADDON: CPT | Performed by: EMERGENCY MEDICINE

## 2022-02-18 PROCEDURE — 84484 ASSAY OF TROPONIN QUANT: CPT | Performed by: EMERGENCY MEDICINE

## 2022-02-18 PROCEDURE — 99285 EMERGENCY DEPT VISIT HI MDM: CPT | Mod: 25 | Performed by: EMERGENCY MEDICINE

## 2022-02-18 PROCEDURE — 258N000003 HC RX IP 258 OP 636: Performed by: EMERGENCY MEDICINE

## 2022-02-18 PROCEDURE — 81001 URINALYSIS AUTO W/SCOPE: CPT | Performed by: EMERGENCY MEDICINE

## 2022-02-18 PROCEDURE — 76705 ECHO EXAM OF ABDOMEN: CPT | Mod: 26 | Performed by: RADIOLOGY

## 2022-02-18 PROCEDURE — 250N000011 HC RX IP 250 OP 636: Performed by: EMERGENCY MEDICINE

## 2022-02-18 PROCEDURE — 74019 RADEX ABDOMEN 2 VIEWS: CPT

## 2022-02-18 PROCEDURE — C9803 HOPD COVID-19 SPEC COLLECT: HCPCS | Performed by: EMERGENCY MEDICINE

## 2022-02-18 PROCEDURE — 250N000013 HC RX MED GY IP 250 OP 250 PS 637: Performed by: EMERGENCY MEDICINE

## 2022-02-18 PROCEDURE — 36415 COLL VENOUS BLD VENIPUNCTURE: CPT | Performed by: EMERGENCY MEDICINE

## 2022-02-18 PROCEDURE — 83605 ASSAY OF LACTIC ACID: CPT | Performed by: EMERGENCY MEDICINE

## 2022-02-18 PROCEDURE — 83690 ASSAY OF LIPASE: CPT | Performed by: PHYSICIAN ASSISTANT

## 2022-02-18 PROCEDURE — 83036 HEMOGLOBIN GLYCOSYLATED A1C: CPT | Performed by: PHYSICIAN ASSISTANT

## 2022-02-18 PROCEDURE — 258N000003 HC RX IP 258 OP 636: Performed by: PHYSICIAN ASSISTANT

## 2022-02-18 PROCEDURE — 93005 ELECTROCARDIOGRAM TRACING: CPT | Performed by: EMERGENCY MEDICINE

## 2022-02-18 PROCEDURE — G0378 HOSPITAL OBSERVATION PER HR: HCPCS

## 2022-02-18 PROCEDURE — 85025 COMPLETE CBC W/AUTO DIFF WBC: CPT | Performed by: PHYSICIAN ASSISTANT

## 2022-02-18 PROCEDURE — 96376 TX/PRO/DX INJ SAME DRUG ADON: CPT | Performed by: EMERGENCY MEDICINE

## 2022-02-18 PROCEDURE — 84100 ASSAY OF PHOSPHORUS: CPT | Performed by: PHYSICIAN ASSISTANT

## 2022-02-18 PROCEDURE — 96361 HYDRATE IV INFUSION ADD-ON: CPT | Performed by: EMERGENCY MEDICINE

## 2022-02-18 PROCEDURE — 76705 ECHO EXAM OF ABDOMEN: CPT

## 2022-02-18 PROCEDURE — 36415 COLL VENOUS BLD VENIPUNCTURE: CPT | Performed by: PHYSICIAN ASSISTANT

## 2022-02-18 RX ORDER — HYDROMORPHONE HYDROCHLORIDE 1 MG/ML
0.5 INJECTION, SOLUTION INTRAMUSCULAR; INTRAVENOUS; SUBCUTANEOUS
Status: COMPLETED | OUTPATIENT
Start: 2022-02-18 | End: 2022-02-19

## 2022-02-18 RX ORDER — AMOXICILLIN 250 MG
1 CAPSULE ORAL 2 TIMES DAILY PRN
Status: DISCONTINUED | OUTPATIENT
Start: 2022-02-18 | End: 2022-02-21 | Stop reason: HOSPADM

## 2022-02-18 RX ORDER — SODIUM CHLORIDE, SODIUM LACTATE, POTASSIUM CHLORIDE, CALCIUM CHLORIDE 600; 310; 30; 20 MG/100ML; MG/100ML; MG/100ML; MG/100ML
INJECTION, SOLUTION INTRAVENOUS CONTINUOUS
Status: DISCONTINUED | OUTPATIENT
Start: 2022-02-18 | End: 2022-02-20

## 2022-02-18 RX ORDER — ONDANSETRON 4 MG/1
4 TABLET, ORALLY DISINTEGRATING ORAL ONCE
Status: COMPLETED | OUTPATIENT
Start: 2022-02-18 | End: 2022-02-18

## 2022-02-18 RX ORDER — NICOTINE POLACRILEX 4 MG
15-30 LOZENGE BUCCAL
Status: DISCONTINUED | OUTPATIENT
Start: 2022-02-18 | End: 2022-02-21 | Stop reason: HOSPADM

## 2022-02-18 RX ORDER — POLYETHYLENE GLYCOL 3350 17 G/17G
17 POWDER, FOR SOLUTION ORAL DAILY PRN
Status: DISCONTINUED | OUTPATIENT
Start: 2022-02-18 | End: 2022-02-21 | Stop reason: HOSPADM

## 2022-02-18 RX ORDER — OLANZAPINE 5 MG/1
5 TABLET, ORALLY DISINTEGRATING ORAL ONCE
Status: COMPLETED | OUTPATIENT
Start: 2022-02-18 | End: 2022-02-18

## 2022-02-18 RX ORDER — PANTOPRAZOLE SODIUM 40 MG/1
40 TABLET, DELAYED RELEASE ORAL ONCE
Status: COMPLETED | OUTPATIENT
Start: 2022-02-18 | End: 2022-02-18

## 2022-02-18 RX ORDER — HYDROMORPHONE HYDROCHLORIDE 1 MG/ML
0.5 INJECTION, SOLUTION INTRAMUSCULAR; INTRAVENOUS; SUBCUTANEOUS
Status: DISCONTINUED | OUTPATIENT
Start: 2022-02-18 | End: 2022-02-18

## 2022-02-18 RX ORDER — DEXTROSE MONOHYDRATE 25 G/50ML
25-50 INJECTION, SOLUTION INTRAVENOUS
Status: DISCONTINUED | OUTPATIENT
Start: 2022-02-18 | End: 2022-02-21 | Stop reason: HOSPADM

## 2022-02-18 RX ADMIN — LIDOCAINE HYDROCHLORIDE 30 ML: 20 SOLUTION ORAL; TOPICAL at 18:21

## 2022-02-18 RX ADMIN — SODIUM CHLORIDE, POTASSIUM CHLORIDE, SODIUM LACTATE AND CALCIUM CHLORIDE: 600; 310; 30; 20 INJECTION, SOLUTION INTRAVENOUS at 23:03

## 2022-02-18 RX ADMIN — HYDROMORPHONE HYDROCHLORIDE 0.5 MG: 1 INJECTION, SOLUTION INTRAMUSCULAR; INTRAVENOUS; SUBCUTANEOUS at 20:38

## 2022-02-18 RX ADMIN — OLANZAPINE 5 MG: 5 TABLET, ORALLY DISINTEGRATING ORAL at 21:59

## 2022-02-18 RX ADMIN — PANTOPRAZOLE SODIUM 40 MG: 40 TABLET, DELAYED RELEASE ORAL at 19:37

## 2022-02-18 RX ADMIN — ONDANSETRON 4 MG: 4 TABLET, ORALLY DISINTEGRATING ORAL at 18:21

## 2022-02-18 RX ADMIN — SODIUM CHLORIDE 1000 ML: 9 INJECTION, SOLUTION INTRAVENOUS at 19:38

## 2022-02-18 RX ADMIN — HYDROMORPHONE HYDROCHLORIDE 0.5 MG: 1 INJECTION, SOLUTION INTRAMUSCULAR; INTRAVENOUS; SUBCUTANEOUS at 18:28

## 2022-02-18 ASSESSMENT — ENCOUNTER SYMPTOMS
ABDOMINAL PAIN: 1
DIARRHEA: 0
NAUSEA: 1
BACK PAIN: 0
VOMITING: 0
STRIDOR: 0
FEVER: 0
BLOOD IN STOOL: 0
DYSURIA: 0

## 2022-02-18 NOTE — ED PROVIDER NOTES
"ED Provider Note  [facility]      History     Chief Complaint   Patient presents with     Abdominal Pain     HPI  Andrew Lockwood is a 54 year old male w/ PMH of AIDS, CNS toxoplasmosis, DM2 and SBO who presents to the Emergency Department for upper abdominal pain.  Patient reports being lactose intolerant and accidentally ate food with dairy 2 days ago.  Then started having intermittent and progressive cramping pain, flatulence and burping. He states this pain does not feel like his past bowel obstructions or like heartburn. It feels \"like diarrhea cramps\" except patient denies having diarrhea. He states he is never had pain like this in the past.  He states that eating food makes his pain worse.  His pain does not radiate anywhere. He has had nausea but no vomiting.  He states he does not drink alcohol.  He also denies history of pancreatitis.  Additionally patient denies dysuria, fever or diarrhea. Patient went to [facility] ED this morning and had a CT scan that showed possible partial small bowel obstruction that was not felt to be consistent with presentation so he was discharged with diagnosis of GERD. Patient states pain worsened since this visit. He states his pain with prior obstructions is lower in the abdomen than the current pain.    Past Medical History  Past Medical History:   Diagnosis Date     AIDS (H)      Allergic rhinitis due to other allergen     DNS     Anal dysplasia      Chronic abdominal pain      CNS toxoplasmosis (H)      Diabetes type 2, controlled (H)      GERD (gastroesophageal reflux disease)      History of seizure      History of substance abuse (H)      HIV (human immunodeficiency virus infection) (H)      HLD (hyperlipidemia)      Lung nodules      Periungual wart      PTSD (post-traumatic stress disorder)      Sleep apnea     doesn't use CPAP     Past Surgical History:   Procedure Laterality Date     ANOSCOPY N/A 9/9/2020    Procedure: Exam Under Anesthesia, " ANOSCOPY, fulgeration of rectal fissures with Rectal Biopsies;  Surgeon: Thanh Lundberg MD;  Location: [facility] OR     COLONOSCOPY Left 1/22/2016    Procedure: COMBINED COLONOSCOPY, SINGLE OR MULTIPLE BIOPSY/POLYPECTOMY BY BIOPSY;  Surgeon: Clark Saini MD;  Location: [facility] GI     HC EXPLORE UNDESC TESTIS,INGUIN/SCROTAL       LAPAROSCOPIC APPENDECTOMY N/A 1/31/2018    Procedure: LAPAROSCOPIC APPENDECTOMY;  LAPAROSCOPIC APPENDECTOMY;  Surgeon: Dawn Holt MD;  Location: [facility] OR     LAPAROSCOPY DIAGNOSTIC (GENERAL) N/A 7/26/2016    Procedure: LAPAROSCOPY DIAGNOSTIC (GENERAL);  Surgeon: Susannah Arriaga MD;  Location: [facility] OR     LAPAROSCOPY DIAGNOSTIC (GENERAL) N/A 4/16/2018    Procedure: LAPAROSCOPY DIAGNOSTIC (GENERAL);  Diagnostic laparoscopy and lysis of adhesions;  Surgeon: Prince Dowling MD;  Location: [facility] OR     OPTICAL TRACKING SYSTEM CRANIOTOMY, EXCISE TUMOR, COMBINED Left 4/10/2015    Procedure: COMBINED OPTICAL TRACKING SYSTEM CRANIOTOMY, EXCISE TUMOR;  Surgeon: Mirlande Colmenares MD;  Location: [facility] OR     REPAIR GAMEKEEPER'S THUMB Right 12/2/2016    Procedure: REPAIR LIGAMENT ULNAR COLLATERAL THUMB (GAMEKEEPER'S);  Surgeon: Evin Zamorano MD;  Location: [facility] OR     ZZC NONSPECIFIC PROCEDURE      right forearm fracture     Alcohol Swabs PADS  bictegravir-emtricitabine-tenofovir (BIKTARVY) -25 MG per tablet  blood glucose (NO BRAND SPECIFIED) test strip  blood glucose calibration (NO BRAND SPECIFIED) solution  blood glucose monitoring (NO BRAND SPECIFIED) meter device kit  glucose 40 % (400 mg/mL) gel  insulin detemir (LEVEMIR PEN) 100 UNIT/ML pen  insulin pen needle (BD SCOTT U/F) 32G X 4 MM miscellaneous  NOVOLOG FLEXPEN 100 UNIT/ML soln  pantoprazole (PROTONIX) 40 MG EC tablet  polyethylene glycol (MIRALAX) 17 GM/Dose powder  SENNA-docusate sodium (SENNA S) 8.6-50 MG tablet  Sharps Container MISC  STATIN NOT PRESCRIBED (INTENTIONAL)  thin (NO BRAND SPECIFIED)  "lancets      Allergies   Allergen Reactions     Metformin      Abdominal pain     Tylenol [Acetaminophen] Itching     Dulaglutide Rash     Insulin Lispro Rash     Patient reported     Penicillin V Other (See Comments) and Rash     Diffuse maculopapular rash + feels \"high\", per pt.      Family History  Family History   Problem Relation Age of Onset     Diabetes Brother      Diabetes Father      Alzheimer Disease Father      Unknown/Adopted Mother      Diabetes Paternal Grandfather      Cancer No family hx of         no skin cancer     Skin Cancer No family hx of         no famiy hx of skin cancer     Glaucoma No family hx of      Macular Degeneration No family hx of      Social History   Social History     Tobacco Use     Smoking status: Current Every Day Smoker     Packs/day: 0.25     Years: 40.00     Pack years: 10.00     Types: Cigarettes     Smokeless tobacco: Former User   Substance Use Topics     Alcohol use: No     Alcohol/week: 0.0 standard drinks     Comment: Last etoh in 2007     Drug use: Not Currently     Types: Marijuana, Methamphetamines      Past medical history, past surgical history, medications, allergies, family history, and social history were reviewed with the patient. No additional pertinent items.       Review of Systems   Constitutional: Negative for fever.   Respiratory: Negative for stridor.    Cardiovascular: Negative for chest pain.   Gastrointestinal: Positive for abdominal pain (LUQ) and nausea. Negative for blood in stool, diarrhea and vomiting.   Genitourinary: Negative for dysuria.   Musculoskeletal: Negative for back pain.   All other systems reviewed and are negative.    A complete review of systems was performed with pertinent positives and negatives noted in the HPI, and all other systems negative.    Physical Exam   BP: (!) 122/91  Pulse: 97  Temp: 98.2  F (36.8  C)  Resp: 16  Weight: 70.3 kg (155 lb)  SpO2: 100 %  Physical Exam  Vitals and nursing note reviewed. "   Constitutional:       General: He is in acute distress.      Appearance: He is well-developed. He is ill-appearing. He is not toxic-appearing or diaphoretic.      Comments: Writhing and restless from pain   HENT:      Head: Normocephalic and atraumatic.      Nose: Nose normal.      Mouth/Throat:      Mouth: Mucous membranes are dry.   Eyes:      General: No scleral icterus.     Conjunctiva/sclera: Conjunctivae normal.   Cardiovascular:      Rate and Rhythm: Normal rate.   Pulmonary:      Effort: Pulmonary effort is normal. No respiratory distress.      Breath sounds: No stridor.   Abdominal:      General: Abdomen is protuberant. There is no distension.      Palpations: Abdomen is soft.      Tenderness: There is abdominal tenderness in the epigastric area. There is no guarding or rebound. Negative signs include Mendes's sign.      Hernia: No hernia is present.   Musculoskeletal:         General: No deformity or signs of injury. Normal range of motion.      Cervical back: Normal range of motion and neck supple. No rigidity.   Skin:     General: Skin is warm and dry.      Coloration: Skin is not jaundiced or pale.      Findings: No rash.   Neurological:      General: No focal deficit present.      Mental Status: He is alert and oriented to person, place, and time.   Psychiatric:         Mood and Affect: Mood normal.         Behavior: Behavior normal. Behavior is cooperative.         Thought Content: Thought content normal.           ED Course      Procedures            EKG Interpretation:      Interpreted by Jeannine Moses MD  Time reviewed: 1918  Symptoms at time of EKG: epigastric pain   Rhythm: normal sinus   Rate: normal  Axis: normal  Ectopy: none  Conduction: normal  ST Segments/ T Waves: No ST-T wave changes  Q Waves: none  Comparison to prior: Unchanged    Clinical Impression: normal EKG                    Results for orders placed or performed during the hospital encounter of 02/18/22   XR Abdomen 2 Views      Status: None    Narrative    EXAMINATION:  XR ABDOMEN 2VIEWS 2/18/2022 5:53 PM     COMPARISON: Radiograph 1/23/2022 and CT 1/22/2022.    HISTORY: eval sbo    TECHNIQUE: Frontal upright and supine views of the abdomen.    FINDINGS: Retained contrast is present in the ureters and bladder. No  evidence of hydronephrosis. The urinary bladder is significantly  distended. Nonobstructive bowel gas pattern. No abnormally gaseous  distended loops of bowel, definite pneumatosis, or portal venous gas.  Visualized lung bases are clear. Penile prosthesis.       Impression    IMPRESSION: Nonobstructive bowel gas pattern. Significant distention  of the urinary bladder without apparent hydronephrosis.    I have personally reviewed the examination and initial interpretation  and I agree with the findings.    JANIE VANN MD         SYSTEM ID:  [id number]   US Abdomen Limited (RUQ)     Status: None    Narrative    EXAM: Limited Abdominal Ultrasound, 2/18/2022     HISTORY: Epigastric pain.    COMPARISON: CT 1/22/2022.    FINDINGS:     Liver: 14.2 cm craniocaudally with normal echotexture. No focal mass.  The main portal vein is patent with antegrade flow.    Gallbladder: Echogenic, nonshadowing and nonmobile 5 x 4 mm focus  along the superior wall of the gallbladder. No mobile or shadowing  stones or pericholecystic fluid. Sonographic Mendes's sign: negative.    Bile Ducts: normal caliber.  CBD diameter: 3 mm.    Pancreas: visualized portions are unremarkable.    Kidney: 9.6 cm in length. No shadowing stones, hydronephrosis, mass or  suspicious cyst.    Ascites: none.    Visualized aorta and IVC are within normal limits.      Impression    IMPRESSION:   5mm gallbladder polyp. Otherwise normal sonographic evaluation of the  right upper quadrant without gallstones or evidence for acute  cholecystitis.    I have personally reviewed the examination and initial interpretation  and I agree with the findings.    JANIE VANN MD          SYSTEM ID:  [id number]   Comprehensive metabolic panel     Status: Abnormal   Result Value Ref Range    Sodium 131 (L) 133 - 144 mmol/L    Potassium 3.9 3.4 - 5.3 mmol/L    Chloride 100 94 - 109 mmol/L    Carbon Dioxide (CO2) 24 20 - 32 mmol/L    Anion Gap 7 3 - 14 mmol/L    Urea Nitrogen 18 7 - 30 mg/dL    Creatinine 0.72 0.66 - 1.25 mg/dL    Calcium 9.6 8.5 - 10.1 mg/dL    Glucose 357 (H) 70 - 99 mg/dL    Alkaline Phosphatase 112 40 - 150 U/L    AST 7 0 - 45 U/L    ALT 17 0 - 70 U/L    Protein Total 8.3 6.8 - 8.8 g/dL    Albumin 3.4 3.4 - 5.0 g/dL    Bilirubin Total 0.7 0.2 - 1.3 mg/dL    GFR Estimate >90 >60 mL/min/1.73m2   Lipase     Status: Normal   Result Value Ref Range    Lipase 191 73 - 393 U/L   CRP inflammation     Status: Normal   Result Value Ref Range    CRP Inflammation 4.1 0.0 - 8.0 mg/L   CBC with platelets and differential     Status: Abnormal   Result Value Ref Range    WBC Count 10.4 4.0 - 11.0 10e3/uL    RBC Count 4.77 4.40 - 5.90 10e6/uL    Hemoglobin 13.3 13.3 - 17.7 g/dL    Hematocrit 39.1 (L) 40.0 - 53.0 %    MCV 82 78 - 100 fL    MCH 27.9 26.5 - 33.0 pg    MCHC 34.0 31.5 - 36.5 g/dL    RDW 14.0 10.0 - 15.0 %    Platelet Count 458 (H) 150 - 450 10e3/uL    % Neutrophils 69 %    % Lymphocytes 23 %    % Monocytes 5 %    % Eosinophils 1 %    % Basophils 1 %    % Immature Granulocytes 1 %    NRBCs per 100 WBC 0 <1 /100    Absolute Neutrophils 7.2 1.6 - 8.3 10e3/uL    Absolute Lymphocytes 2.4 0.8 - 5.3 10e3/uL    Absolute Monocytes 0.6 0.0 - 1.3 10e3/uL    Absolute Eosinophils 0.1 0.0 - 0.7 10e3/uL    Absolute Basophils 0.1 0.0 - 0.2 10e3/uL    Absolute Immature Granulocytes 0.1 <=0.4 10e3/uL    Absolute NRBCs 0.0 10e3/uL   Extra Red Top Tube     Status: None   Result Value Ref Range    Hold Specimen JIC    Extra Green Top (Lithium Heparin) Tube     Status: None   Result Value Ref Range    Hold Specimen JIC    Extra Purple Top Tube     Status: None   Result Value Ref Range    Hold Specimen JIC     Extra Blue Top Tube     Status: None   Result Value Ref Range    Hold Specimen JIC    UA with Microscopic reflex to Culture     Status: Abnormal    Specimen: Urine, Midstream   Result Value Ref Range    Color Urine Light Yellow Colorless, Straw, Light Yellow, Yellow    Appearance Urine Clear Clear    Glucose Urine >=1000 (A) Negative mg/dL    Bilirubin Urine Negative Negative    Ketones Urine Negative Negative mg/dL    Specific Gravity Urine 1.010 1.003 - 1.035    Blood Urine Negative Negative    pH Urine 6.0 5.0 - 7.0    Protein Albumin Urine 20  (A) Negative mg/dL    Urobilinogen Urine Normal Normal, 2.0 mg/dL    Nitrite Urine Negative Negative    Leukocyte Esterase Urine Negative Negative    RBC Urine 0 <=2 /HPF    WBC Urine 2 <=5 /HPF    Narrative    Urine Culture not indicated   Lactic acid whole blood     Status: Normal   Result Value Ref Range    Lactic Acid 1.1 0.7 - 2.0 mmol/L   Troponin I     Status: Normal   Result Value Ref Range    Troponin I High Sensitivity <3 <79 ng/L   Hemoglobin A1c     Status: Abnormal   Result Value Ref Range    Hemoglobin A1C 11.2 (H) 0.0 - 5.6 %   Glucose by meter     Status: Abnormal   Result Value Ref Range    GLUCOSE BY METER POCT 212 (H) 70 - 99 mg/dL   EKG 12-lead, tracing only     Status: None   Result Value Ref Range    Systolic Blood Pressure  mmHg    Diastolic Blood Pressure  mmHg    Ventricular Rate 91 BPM    Atrial Rate 91 BPM    IA Interval 126 ms    QRS Duration 88 ms     ms    QTc 442 ms    P Axis 33 degrees    R AXIS 33 degrees    T Axis 32 degrees    Interpretation ECG       Sinus rhythm  Possible Lateral infarct , age undetermined  Abnormal ECG  Unconfirmed report - interpretation of this ECG is computer generated - see medical record for final interpretation    Confirmed by - EMERGENCY ROOM, PHYSICIAN ([id number]),  UMAIR NAJERA ([id number]) on 2/18/2022 7:18:49 PM     CBC with platelets differential     Status: Abnormal    Narrative    The following  orders were created for panel order CBC with platelets differential.  Procedure                               Abnormality         Status                     ---------                               -----------         ------                     CBC with platelets and d...[[id number]]  Abnormal            Final result                 Please view results for these tests on the individual orders.   Lafe Draw     Status: None    Narrative    The following orders were created for panel order Lafe Draw.  Procedure                               Abnormality         Status                     ---------                               -----------         ------                     Extra Red Top Tube[[id number]]                               Final result               Extra Green Top (Lithium...[[id number]]                      Final result               Extra Purple Top Tube[[id number]]                            Final result                 Please view results for these tests on the individual orders.   Lafe Draw     Status: None    Narrative    The following orders were created for panel order [facility]fe Draw.  Procedure                               Abnormality         Status                     ---------                               -----------         ------                     Extra Blue Top Tube[[id number]]                              Final result                 Please view results for these tests on the individual orders.   Lafe Draw *Canceled*     Status: None ()    Narrative    The following orders were created for panel order Lafe Draw.  Procedure                               Abnormality         Status                     ---------                               -----------         ------                       Please view results for these tests on the individual orders.     Medications   bictegravir-emtricitabine-tenofovir (BIKTARVY) -25 MG per tablet 1 tablet (has no administration in time range)    insulin detemir (LEVEMIR PEN) injection 10 Units (10 Units Subcutaneous Given 2/19/22 0026)   polyethylene glycol (MIRALAX) Packet 17 g (has no administration in time range)   senna-docusate (SENOKOT-S/PERICOLACE) 8.6-50 MG per tablet 1 tablet (has no administration in time range)   glucose gel 15-30 g (has no administration in time range)     Or   dextrose 50 % injection 25-50 mL (has no administration in time range)     Or   glucagon injection 1 mg (has no administration in time range)   insulin aspart (NovoLOG) injection (RAPID ACTING) (1 Units Subcutaneous Not Given 2/19/22 0025)   lactated ringers infusion ( Intravenous Rate/Dose Verify 2/19/22 0207)   pantoprazole (PROTONIX) IV push injection 40 mg (has no administration in time range)   ondansetron (ZOFRAN-ODT) ODT tab 4 mg (4 mg Oral Given 2/18/22 1821)   HYDROmorphone (PF) (DILAUDID) injection 0.5 mg (0.5 mg Intravenous Given 2/19/22 0029)   lidocaine (viscous) (XYLOCAINE) 2 % 15 mL, alum & mag hydroxide-simethicone (MAALOX) 15 mL GI Cocktail (30 mLs Oral Given 2/18/22 1821)   pantoprazole (PROTONIX) EC tablet 40 mg (40 mg Oral Given 2/18/22 1937)   0.9% sodium chloride BOLUS (0 mLs Intravenous Stopped 2/18/22 2103)   OLANZapine zydis (zyPREXA) ODT tab 5 mg (5 mg Oral Given 2/18/22 2159)        Assessments & Plan (with Medical Decision Making)   Andrew Lockwood is a 54 year old male w/ PMH of AIDS, CNS toxoplasmosis, DM2 and SBO who presents to the Emergency Department for upper abdominal pain.     Ddx: biliary cholic, cholecystitis, choledocholithiasis, pancreatitis, gastritis, PUD, gastroparesis, ACUTE CORONARY SYNDROME, partial SBO, mesenteric ischemia     Patient in acute distress on arrival writhing and restless on the bed.  Tender in the epigastric region without abdominal distention.  Negative Mendes sign.  No vomiting.  Reviewed results of bedside CT scan obtained at 10:43 AM this morning.  This showed focally dilated small bowel loops in the  central abdomen which are overall decreased compared to prior CT scan 2/5.  Interpreted as possible partial obstruction with distal compressed loops.  No other abnormalities were noted on the scan and abdominal aorta was normal caliber.  No renal calculi or hydronephrosis.  Obtained an abdominal x-ray which showed a nonobstructive bowel gas pattern.  There was no dilated loops of bowel in the central abdomen on my read the entire bowel looks decompressed.  The patient also states that this pain is different than his normal pain with previous bowel obstructions.  The patient does have a history of asymptomatic cholelithiasis.  He has a negative Mendes sign but in absence of an alternative explanation for patient's epigastric pain will obtain a biliary ultrasound.  Repeat labs from today show normal white count and hemoglobin.  Platelets elevated to 458.  Negative troponin.  EKG shows normal sinus rhythm without acute ischemic changes.  Lipase normal.  Glucose 357.  Normal creatinine.  Sodium 131.  Liver enzymes and alk phos normal.  Lactate normal.  Patient given IV Dilaudid, a GI cocktail, oral Protonix, and IV fluids.    Right upper quadrant ultrasound shows 5 mm gallbladder polyp without evidence of gallstones or acute cholecystitis.  CBD 3 mm.    Patient reports severe pain after eating crackers. Given Zyprexa. Unclear cause for pain. Will admit to ED obs.       I have reviewed the nursing notes. I have reviewed the findings, diagnosis, plan and need for follow up with the patient.    New Prescriptions    No medications on file       Final diagnoses:   Abdominal pain, epigastric   I, Ryan Souza, am serving as a trained medical scribe to document services personally performed by Jeannine Moses MD, based on the provider's statements to me.     I, Jeannine Moses MD, was physically present and have reviewed and verified the accuracy of this note documented by Ryan Souza.      --  Jeannine Moses MD  [facility]  [facility] EMERGENCY DEPARTMENT  2/18/2022     Jeannine Moses MD  02/19/22 0310

## 2022-02-18 NOTE — ED PROVIDER NOTES
ED Triage Provider Note  [facility]  Encounter Date: Feb 18, 2022    History:  Chief Complaint   Patient presents with     Abdominal Pain     Andrew Lockwood is a 54 year old male who presents to the ED with 3-day history of epigastric pain.  Patient presents alone.  He states that over the last 3 days he has had ongoing epigastric pain, exacerbated with eating.  This is associated with some intermittent nausea.  Denies any fevers, emesis, diarrhea.  He denies any urinary symptoms.  Patient was seen earlier this morning per EMR review at which point he had evaluation including CBC without leukocytosis, stable hemoglobin 13.1.  Metabolic function was within reference range.  CT scan of the abdomen obtained this morning returned with findings possibly concerning for small bowel obstruction.  Patient states he was seen by provider in the emergency department, with the his presentation not to be consistent with a bowel obstruction.    Patient returns today to the emergency department here at [facility] with worsening upper abdominal pain.  Patient states that symptoms have worsened since leaving the prior ER.  He states that he would like to have some pain medication today, as Dilaudid works well for him.  He does report a multitude of abdominal surgeries in the past.    Review of Systems:  Negative for fever chest pain shortness of breath    Exam:  BP (!) 122/91   Pulse 97   Temp 98.2  F (36.8  C) (Oral)   Resp 16   Wt 70.3 kg (155 lb)   SpO2 100%   BMI 26.61 kg/m    General: No acute distress. Appears stated age.   Cardio: Regular rate, extremities well perfused  Resp: Normal work of breathing, grossly normal respiratory rate  Abdominal: Patient has some mild tenderness to palpation of the epigastrium.  No definite Mendes sign.  Patient appears to have pain only during the examination, otherwise relaxed, cracking jokes  Neuro: Alert.     Medical Decision  Making:  Patient arriving to the ED with problem as above. A medical screening exam was performed.  CBC CMP lipase CRP, [place] orders initiated from Triage. The patient is appropriate to wait in triage.      Adri Oswald PA-C on 2/18/2022 at 4:11 PM       Adri Oswald PA-C  02/18/22 2104

## 2022-02-18 NOTE — ED TRIAGE NOTES
53yo male ambulatory comes into the ED c/o upper abdominal pain.   Pt reports being lactose intolerant, and accidentally ate food with dairy 2 days ago.   +nausea

## 2022-02-19 ENCOUNTER — APPOINTMENT (OUTPATIENT)
Dept: CT IMAGING | Facility: CLINIC | Age: 57
End: 2022-02-19
Payer: COMMERCIAL

## 2022-02-19 ENCOUNTER — APPOINTMENT (OUTPATIENT)
Dept: GENERAL RADIOLOGY | Facility: CLINIC | Age: 57
End: 2022-02-19
Payer: COMMERCIAL

## 2022-02-19 LAB
ALBUMIN SERPL-MCNC: 3.1 G/DL (ref 3.4–5)
ALP SERPL-CCNC: 102 U/L (ref 40–150)
ALT SERPL W P-5'-P-CCNC: 16 U/L (ref 0–70)
ANION GAP SERPL CALCULATED.3IONS-SCNC: 5 MMOL/L (ref 3–14)
AST SERPL W P-5'-P-CCNC: 12 U/L (ref 0–45)
BASOPHILS # BLD AUTO: 0.1 10E3/UL (ref 0–0.2)
BASOPHILS NFR BLD AUTO: 1 %
BILIRUB SERPL-MCNC: 0.6 MG/DL (ref 0.2–1.3)
BUN SERPL-MCNC: 17 MG/DL (ref 7–30)
CALCIUM SERPL-MCNC: 9.2 MG/DL (ref 8.5–10.1)
CHLORIDE BLD-SCNC: 104 MMOL/L (ref 94–109)
CO2 SERPL-SCNC: 27 MMOL/L (ref 20–32)
CREAT SERPL-MCNC: 0.58 MG/DL (ref 0.66–1.25)
CRP SERPL-MCNC: 5.6 MG/L (ref 0–8)
EOSINOPHIL # BLD AUTO: 0.2 10E3/UL (ref 0–0.7)
EOSINOPHIL NFR BLD AUTO: 2 %
ERYTHROCYTE [DISTWIDTH] IN BLOOD BY AUTOMATED COUNT: 14.2 % (ref 10–15)
FLUAV RNA SPEC QL NAA+PROBE: NEGATIVE
FLUBV RNA RESP QL NAA+PROBE: NEGATIVE
GFR SERPL CREATININE-BSD FRML MDRD: >90 ML/MIN/1.73M2
GLUCOSE BLD-MCNC: 281 MG/DL (ref 70–99)
GLUCOSE BLDC GLUCOMTR-MCNC: 168 MG/DL (ref 70–99)
GLUCOSE BLDC GLUCOMTR-MCNC: 179 MG/DL (ref 70–99)
GLUCOSE BLDC GLUCOMTR-MCNC: 212 MG/DL (ref 70–99)
GLUCOSE BLDC GLUCOMTR-MCNC: 228 MG/DL (ref 70–99)
GLUCOSE BLDC GLUCOMTR-MCNC: 247 MG/DL (ref 70–99)
GLUCOSE BLDC GLUCOMTR-MCNC: 263 MG/DL (ref 70–99)
GLUCOSE BLDC GLUCOMTR-MCNC: 265 MG/DL (ref 70–99)
HCT VFR BLD AUTO: 40.8 % (ref 40–53)
HGB BLD-MCNC: 13.1 G/DL (ref 13.3–17.7)
IMM GRANULOCYTES # BLD: 0.1 10E3/UL
IMM GRANULOCYTES NFR BLD: 1 %
LACTATE SERPL-SCNC: 0.8 MMOL/L (ref 0.7–2)
LYMPHOCYTES # BLD AUTO: 2.3 10E3/UL (ref 0.8–5.3)
LYMPHOCYTES NFR BLD AUTO: 27 %
MAGNESIUM SERPL-MCNC: 1.7 MG/DL (ref 1.8–2.6)
MCH RBC QN AUTO: 27.9 PG (ref 26.5–33)
MCHC RBC AUTO-ENTMCNC: 32.1 G/DL (ref 31.5–36.5)
MCV RBC AUTO: 87 FL (ref 78–100)
MONOCYTES # BLD AUTO: 0.6 10E3/UL (ref 0–1.3)
MONOCYTES NFR BLD AUTO: 7 %
NEUTROPHILS # BLD AUTO: 5.5 10E3/UL (ref 1.6–8.3)
NEUTROPHILS NFR BLD AUTO: 62 %
NRBC # BLD AUTO: 0 10E3/UL
NRBC BLD AUTO-RTO: 0 /100
PHOSPHATE SERPL-MCNC: 3.2 MG/DL (ref 2.5–4.5)
PLAT MORPH BLD: NORMAL
PLATELET # BLD AUTO: 334 10E3/UL (ref 150–450)
POTASSIUM BLD-SCNC: 3.8 MMOL/L (ref 3.4–5.3)
PROT SERPL-MCNC: 7.4 G/DL (ref 6.8–8.8)
RBC # BLD AUTO: 4.7 10E6/UL (ref 4.4–5.9)
RBC MORPH BLD: NORMAL
RSV RNA SPEC NAA+PROBE: NEGATIVE
SARS-COV-2 RNA RESP QL NAA+PROBE: NEGATIVE
SODIUM SERPL-SCNC: 136 MMOL/L (ref 133–144)
WBC # BLD AUTO: 8.7 10E3/UL (ref 4–11)

## 2022-02-19 PROCEDURE — 250N000013 HC RX MED GY IP 250 OP 250 PS 637: Performed by: PHYSICIAN ASSISTANT

## 2022-02-19 PROCEDURE — 96372 THER/PROPH/DIAG INJ SC/IM: CPT | Performed by: PHYSICIAN ASSISTANT

## 2022-02-19 PROCEDURE — 250N000011 HC RX IP 250 OP 636: Performed by: PHYSICIAN ASSISTANT

## 2022-02-19 PROCEDURE — 96365 THER/PROPH/DIAG IV INF INIT: CPT | Mod: 59 | Performed by: EMERGENCY MEDICINE

## 2022-02-19 PROCEDURE — C9113 INJ PANTOPRAZOLE SODIUM, VIA: HCPCS | Performed by: NURSE PRACTITIONER

## 2022-02-19 PROCEDURE — G0378 HOSPITAL OBSERVATION PER HR: HCPCS

## 2022-02-19 PROCEDURE — 258N000003 HC RX IP 258 OP 636: Performed by: NURSE PRACTITIONER

## 2022-02-19 PROCEDURE — 250N000011 HC RX IP 250 OP 636: Performed by: INTERNAL MEDICINE

## 2022-02-19 PROCEDURE — 82962 GLUCOSE BLOOD TEST: CPT

## 2022-02-19 PROCEDURE — 250N000011 HC RX IP 250 OP 636: Performed by: EMERGENCY MEDICINE

## 2022-02-19 PROCEDURE — 74177 CT ABD & PELVIS W/CONTRAST: CPT

## 2022-02-19 PROCEDURE — 250N000012 HC RX MED GY IP 250 OP 636 PS 637: Performed by: PHYSICIAN ASSISTANT

## 2022-02-19 PROCEDURE — 250N000011 HC RX IP 250 OP 636: Performed by: NURSE PRACTITIONER

## 2022-02-19 PROCEDURE — 87637 SARSCOV2&INF A&B&RSV AMP PRB: CPT | Performed by: INTERNAL MEDICINE

## 2022-02-19 PROCEDURE — 99207 PR CDG-MDM COMPONENT: MEETS MODERATE - DOWN CODED: CPT | Performed by: INTERNAL MEDICINE

## 2022-02-19 PROCEDURE — 99225 PR SUBSEQUENT OBSERVATION CARE,LEVEL II: CPT | Mod: GC | Performed by: INTERNAL MEDICINE

## 2022-02-19 PROCEDURE — 85025 COMPLETE CBC W/AUTO DIFF WBC: CPT | Performed by: PHYSICIAN ASSISTANT

## 2022-02-19 PROCEDURE — 74177 CT ABD & PELVIS W/CONTRAST: CPT | Mod: 26 | Performed by: RADIOLOGY

## 2022-02-19 PROCEDURE — 999N000065 XR ABDOMEN PORT 1 VIEWS

## 2022-02-19 PROCEDURE — 96372 THER/PROPH/DIAG INJ SC/IM: CPT

## 2022-02-19 PROCEDURE — 83735 ASSAY OF MAGNESIUM: CPT | Performed by: PHYSICIAN ASSISTANT

## 2022-02-19 PROCEDURE — 36415 COLL VENOUS BLD VENIPUNCTURE: CPT | Performed by: PHYSICIAN ASSISTANT

## 2022-02-19 PROCEDURE — 250N000011 HC RX IP 250 OP 636

## 2022-02-19 PROCEDURE — 86140 C-REACTIVE PROTEIN: CPT | Performed by: PHYSICIAN ASSISTANT

## 2022-02-19 PROCEDURE — C9113 INJ PANTOPRAZOLE SODIUM, VIA: HCPCS | Performed by: PHYSICIAN ASSISTANT

## 2022-02-19 PROCEDURE — 96376 TX/PRO/DX INJ SAME DRUG ADON: CPT | Performed by: EMERGENCY MEDICINE

## 2022-02-19 PROCEDURE — 83605 ASSAY OF LACTIC ACID: CPT | Performed by: PHYSICIAN ASSISTANT

## 2022-02-19 PROCEDURE — 96366 THER/PROPH/DIAG IV INF ADDON: CPT | Performed by: EMERGENCY MEDICINE

## 2022-02-19 PROCEDURE — 80053 COMPREHEN METABOLIC PANEL: CPT | Performed by: PHYSICIAN ASSISTANT

## 2022-02-19 PROCEDURE — 74018 RADEX ABDOMEN 1 VIEW: CPT | Mod: 26 | Performed by: STUDENT IN AN ORGANIZED HEALTH CARE EDUCATION/TRAINING PROGRAM

## 2022-02-19 RX ORDER — HYDROMORPHONE HCL IN WATER/PF 6 MG/30 ML
0.2 PATIENT CONTROLLED ANALGESIA SYRINGE INTRAVENOUS ONCE
Status: COMPLETED | OUTPATIENT
Start: 2022-02-19 | End: 2022-02-19

## 2022-02-19 RX ORDER — MAGNESIUM SULFATE HEPTAHYDRATE 40 MG/ML
2 INJECTION, SOLUTION INTRAVENOUS ONCE
Status: COMPLETED | OUTPATIENT
Start: 2022-02-19 | End: 2022-02-19

## 2022-02-19 RX ORDER — ONDANSETRON 4 MG/1
4 TABLET, ORALLY DISINTEGRATING ORAL EVERY 6 HOURS PRN
Status: DISCONTINUED | OUTPATIENT
Start: 2022-02-19 | End: 2022-02-21 | Stop reason: HOSPADM

## 2022-02-19 RX ORDER — HEPARIN SODIUM 5000 [USP'U]/.5ML
5000 INJECTION, SOLUTION INTRAVENOUS; SUBCUTANEOUS EVERY 12 HOURS
Status: DISCONTINUED | OUTPATIENT
Start: 2022-02-19 | End: 2022-02-21 | Stop reason: HOSPADM

## 2022-02-19 RX ORDER — PROCHLORPERAZINE 25 MG
25 SUPPOSITORY, RECTAL RECTAL EVERY 12 HOURS PRN
Status: DISCONTINUED | OUTPATIENT
Start: 2022-02-19 | End: 2022-02-21 | Stop reason: HOSPADM

## 2022-02-19 RX ORDER — IOPAMIDOL 755 MG/ML
95 INJECTION, SOLUTION INTRAVASCULAR ONCE
Status: COMPLETED | OUTPATIENT
Start: 2022-02-19 | End: 2022-02-19

## 2022-02-19 RX ORDER — HYDROMORPHONE HYDROCHLORIDE 1 MG/ML
0.5 INJECTION, SOLUTION INTRAMUSCULAR; INTRAVENOUS; SUBCUTANEOUS EVERY 6 HOURS PRN
Status: DISCONTINUED | OUTPATIENT
Start: 2022-02-19 | End: 2022-02-20

## 2022-02-19 RX ORDER — PROCHLORPERAZINE MALEATE 5 MG
10 TABLET ORAL EVERY 6 HOURS PRN
Status: DISCONTINUED | OUTPATIENT
Start: 2022-02-19 | End: 2022-02-21 | Stop reason: HOSPADM

## 2022-02-19 RX ORDER — LIDOCAINE 40 MG/G
CREAM TOPICAL
Status: DISCONTINUED | OUTPATIENT
Start: 2022-02-19 | End: 2022-02-21 | Stop reason: HOSPADM

## 2022-02-19 RX ORDER — ONDANSETRON 2 MG/ML
4 INJECTION INTRAMUSCULAR; INTRAVENOUS EVERY 6 HOURS PRN
Status: DISCONTINUED | OUTPATIENT
Start: 2022-02-19 | End: 2022-02-21 | Stop reason: HOSPADM

## 2022-02-19 RX ORDER — OXYCODONE HYDROCHLORIDE 5 MG/1
5 TABLET ORAL EVERY 6 HOURS PRN
Status: DISCONTINUED | OUTPATIENT
Start: 2022-02-19 | End: 2022-02-19

## 2022-02-19 RX ADMIN — INSULIN DETEMIR 10 UNITS: 100 INJECTION, SOLUTION SUBCUTANEOUS at 20:02

## 2022-02-19 RX ADMIN — INSULIN ASPART 3 UNITS: 100 INJECTION, SOLUTION INTRAVENOUS; SUBCUTANEOUS at 12:42

## 2022-02-19 RX ADMIN — DIATRIZOATE MEGLUMINE AND DIATRIZOATE SODIUM 75 ML: 660; 100 SOLUTION ORAL; RECTAL at 19:27

## 2022-02-19 RX ADMIN — PANTOPRAZOLE SODIUM 40 MG: 40 INJECTION, POWDER, FOR SOLUTION INTRAVENOUS at 08:54

## 2022-02-19 RX ADMIN — HYDROMORPHONE HYDROCHLORIDE 0.5 MG: 1 INJECTION, SOLUTION INTRAMUSCULAR; INTRAVENOUS; SUBCUTANEOUS at 23:48

## 2022-02-19 RX ADMIN — INSULIN ASPART 3 UNITS: 100 INJECTION, SOLUTION INTRAVENOUS; SUBCUTANEOUS at 08:59

## 2022-02-19 RX ADMIN — INSULIN ASPART 2 UNITS: 100 INJECTION, SOLUTION INTRAVENOUS; SUBCUTANEOUS at 16:19

## 2022-02-19 RX ADMIN — SODIUM CHLORIDE, POTASSIUM CHLORIDE, SODIUM LACTATE AND CALCIUM CHLORIDE: 600; 310; 30; 20 INJECTION, SOLUTION INTRAVENOUS at 14:58

## 2022-02-19 RX ADMIN — HYDROMORPHONE HYDROCHLORIDE 0.5 MG: 1 INJECTION, SOLUTION INTRAMUSCULAR; INTRAVENOUS; SUBCUTANEOUS at 00:29

## 2022-02-19 RX ADMIN — HYDROMORPHONE HYDROCHLORIDE 0.2 MG: 0.2 INJECTION, SOLUTION INTRAMUSCULAR; INTRAVENOUS; SUBCUTANEOUS at 09:59

## 2022-02-19 RX ADMIN — INSULIN DETEMIR 10 UNITS: 100 INJECTION, SOLUTION SUBCUTANEOUS at 00:26

## 2022-02-19 RX ADMIN — INSULIN ASPART 3 UNITS: 100 INJECTION, SOLUTION INTRAVENOUS; SUBCUTANEOUS at 05:25

## 2022-02-19 RX ADMIN — HYDROMORPHONE HYDROCHLORIDE 0.2 MG: 0.2 INJECTION, SOLUTION INTRAMUSCULAR; INTRAVENOUS; SUBCUTANEOUS at 05:33

## 2022-02-19 RX ADMIN — INSULIN DETEMIR 10 UNITS: 100 INJECTION, SOLUTION SUBCUTANEOUS at 08:57

## 2022-02-19 RX ADMIN — DIATRIZOATE MEGLUMINE AND DIATRIZOATE SODIUM 75 ML: 660; 100 SOLUTION ORAL; RECTAL at 09:07

## 2022-02-19 RX ADMIN — INSULIN ASPART 1 UNITS: 100 INJECTION, SOLUTION INTRAVENOUS; SUBCUTANEOUS at 23:52

## 2022-02-19 RX ADMIN — HEPARIN SODIUM 5000 UNITS: 5000 INJECTION, SOLUTION INTRAVENOUS; SUBCUTANEOUS at 20:02

## 2022-02-19 RX ADMIN — BICTEGRAVIR SODIUM, EMTRICITABINE, AND TENOFOVIR ALAFENAMIDE FUMARATE 1 TABLET: 50; 200; 25 TABLET ORAL at 09:02

## 2022-02-19 RX ADMIN — MAGNESIUM SULFATE IN WATER 2 G: 40 INJECTION, SOLUTION INTRAVENOUS at 12:41

## 2022-02-19 RX ADMIN — IOPAMIDOL 95 ML: 755 INJECTION, SOLUTION INTRAVENOUS at 05:21

## 2022-02-19 RX ADMIN — INSULIN ASPART 1 UNITS: 100 INJECTION, SOLUTION INTRAVENOUS; SUBCUTANEOUS at 20:04

## 2022-02-19 RX ADMIN — PANTOPRAZOLE SODIUM 40 MG: 40 INJECTION, POWDER, FOR SOLUTION INTRAVENOUS at 20:02

## 2022-02-19 ASSESSMENT — ENCOUNTER SYMPTOMS
SUBJECTIVE PAIN PROGRESSION: UNCHANGED
SUBJECTIVE PATIENT PAIN CONTROL: POORLY CONTROLLED
ACTIVITY IMPAIRMENT: IMPAIRED DUE TO PAIN
DIETARY ISSUES: POOR INTAKE
PAIN SEVERITY NOW: MODERATE

## 2022-02-19 ASSESSMENT — ACTIVITIES OF DAILY LIVING (ADL): DEPENDENT_IADLS:: INDEPENDENT

## 2022-02-19 NOTE — PROGRESS NOTES
Andrew Lockwood is a 54 year old male patient.  1. Abdominal pain, epigastric      Past Medical History:   Diagnosis Date     AIDS (H)      Allergic rhinitis due to other allergen     DNS     Anal dysplasia      Chronic abdominal pain      CNS toxoplasmosis (H)      Diabetes type 2, controlled (H)      GERD (gastroesophageal reflux disease)      History of seizure      History of substance abuse (H)      HIV (human immunodeficiency virus infection) (H)      HLD (hyperlipidemia)      Lung nodules      Periungual wart      PTSD (post-traumatic stress disorder)      Sleep apnea     doesn't use CPAP     Current Outpatient Medications   Medication Sig Dispense Refill     bictegravir-emtricitabine-tenofovir (BIKTARVY) -25 MG per tablet Take 1 tablet by mouth daily 30 tablet 6     insulin detemir (LEVEMIR PEN) 100 UNIT/ML pen Inject 20 Units Subcutaneous 2 times daily 15 mL 0     NOVOLOG FLEXPEN 100 UNIT/ML soln Inject 10 Units Subcutaneous 3 times daily (with meals) 75 mL 0     pantoprazole (PROTONIX) 40 MG EC tablet Take 1 tablet (40 mg) by mouth daily 30 tablet 6     polyethylene glycol (MIRALAX) 17 GM/Dose powder Take 17 g by mouth daily as needed 510 g 0     SENNA-docusate sodium (SENNA S) 8.6-50 MG tablet Take 1 tablet by mouth 2 times daily as needed (constipation) 30 tablet 0     Alcohol Swabs PADS Use to swab the area of the injection or todd as directed Per insurance coverage 100 each 0     blood glucose (NO BRAND SPECIFIED) test strip Use to test blood sugar 4 times daily or as directed. To accompany:whatever is covered 360 strip 3     blood glucose calibration (NO BRAND SPECIFIED) solution Used to calibrate the blood glucose monitor as needed and as directed.  To accompany  blood glucose brands per insurance coverage 1 each 0     blood glucose monitoring (NO BRAND SPECIFIED) meter device kit Use as directed. Per insurance coverage. 1 kit 0     glucose 40 % (400 mg/mL) gel 15 g every 15 minutes by mouth as  "needed for low blood sugar. Oral gel is preferable for conscious and able to swallow patient. 112.5 g 0     insulin pen needle (BD SCOTT U/F) 32G X 4 MM miscellaneous USE 3 TO 4 NEEDLES DAILY 400 each 3     Sharps Container MISC Use as directed to dispose of needles, lancets and other sharps. Per Insurance coverage 1 each 0     STATIN NOT PRESCRIBED (INTENTIONAL) Please choose reason not prescribed, below       thin (NO BRAND SPECIFIED) lancets Use with lanceting device. To accompany: Test 4 times daily with whatever is covered 360 each 3     Allergies   Allergen Reactions     Metformin      Abdominal pain     Tylenol [Acetaminophen] Itching     Dulaglutide Rash     Insulin Lispro Rash     Patient reported     Penicillin V Other (See Comments) and Rash     Diffuse maculopapular rash + feels \"high\", per pt.      Active Problems:    Abdominal pain    Blood pressure 112/75, pulse 93, temperature 98.1  F (36.7  C), temperature source Oral, resp. rate 16, weight 70.3 kg (155 lb), SpO2 96 %.    Subjective:  Symptoms:  Stable.  (Intermittent abd pain).    Diet:  Poor intake.    Activity level: Impaired due to pain.    Pain:  He complains of pain that is moderate.  He reports pain is unchanged.  Pain is poorly controlled.      Objective:  General Appearance:  Uncomfortable.    Vital signs: (most recent): Blood pressure 112/75, pulse 93, temperature 98.1  F (36.7  C), temperature source Oral, resp. rate 16, weight 70.3 kg (155 lb), SpO2 96 %.  Vital signs are normal.    Output: Producing urine.    HEENT: Normal HEENT exam.    Lungs:  Normal effort and normal respiratory rate.  Breath sounds clear to auscultation.    Heart: Normal rate.  Regular rhythm.  S1 normal and S2 normal.    Abdomen: Abdomen is soft.  Bowel sounds are normal.   There is no abdominal tenderness.     Extremities: Normal range of motion.    Pulses: Distal pulses are intact.    Neurological: Patient is alert.      Assessment:    Condition: In stable " condition.  Unchanged.   (54 yom with HIV on HAART CNS toxo DM presents with recurrent SBO. Appreciate Surgery input, gastrographin in.     Plan to transfer to Medicine today.).     The pt was seen and examined by myself. The case was reviewed and the plan was discussed with the [facility].    Zaire Oropeza MD, MD  2/19/2022

## 2022-02-19 NOTE — PROGRESS NOTES
[facility]    Progress Note - Medicine Service, [facility] TEAM 2       Date of Admission:  2/18/2022    Assessment & Plan        Andrew Lockwood is a 54 year old male with a past medical history of HIV on HAART (undectable viral load), history of CNS toxoplasmosis, T2DM admitted for recurrent SBO management and decompression.     # Recurrent SBO  This patient has a history of reoccuring SBO's that have resolved nonoperatively over the last few hospitalizations (2/5/22-2/8/22, 1/22/22-1/25/22). He has had two exploratory laparotomies in the past (2016,2018) with no source of obstruction noted on either occasion. CT on 10/5/18 showed focal small bowel enteritis with associated wall thickening and narrowing small bowel dilation proximal to the inflammation. He has had chronic constipation since childhood. He has had negative workup for Crohn's disease including negative EGD and colonoscopy. The most recent CT scan shows nonobstructive bowel gas pattern, significant distention of the urinary bladder without apparent hydronephrosis. Abdominal US reports 5mm gallbladder polyp otherwise normal. Reviewing the EMR, surgery has been consulted in the past and recommended diagnostic laparoscopy w/ ANASTASIIA but patient declined and was able to improve clinically and discharge without surgical intervention. His past admissions with SBO have resolved with conservative management and other times have required NGT. Followed by colorectal surgery and GI with multidisciplinary work-up for recurrent SBO of unknown etiology and lost to follow up. He states he last passed flatus or had a bowel movement two days ago.  He denies fevers or chills.    - NGT placed and abdominal XR showing preferred placement   - Placed on suction this afternoon (1300), will get gastrografin 6 hours after starting suction (1900), then followed by abdominal XR 8 hours later (0300).   - Surgery following, appreciate  recs   - IVF LR running at 125 ml/hr   - Zofran and compazine PRN's   - Protonix IV 40 mg BID   - IV dilaudid 0.5 mg q6h PRN (try to avoid with bowel obstruction)    # T2DM  A1c = 11.2% on 2/18/22. Was supposed to be discharge on levemir 20 units BID, but was sent on 10 units BID. With this A1c, good chance patient has been noncompliant with the current regimen.   - Continue 10 units BID while NPO   - Medium sliding scale insulin   - Hypoglycemia protocol ordered   - Will consult endocrinology tomorrow for better regimen that fits the socio-economical status of this patient    # HIV  # History of CNS Toxoplasmosis  HIV not detectable and most recent CD4 count 519 on 8/24/21   - Continue PTA bictarvy    # Financial Insecurity  Patient is currently living in an [facility] with financial insecurity.    -  consult placed for discharge planning     Diet: NPO for Medical/Clinical Reasons Except for: Ice Chips    DVT Prophylaxis: subcutaneous heparin  Potts Catheter: Not present  Fluids: LR at 125 ml/hr  Central Lines: None  Cardiac Monitoring: None  Code Status: Full Code      Disposition Plan   Expected Discharge: Greater than 2 midnights  Anticipated discharge location:  Awaiting care coordination huddle     The patient's care was discussed with the Attending Physician, Dr. Aaron.    Nico Mason MD  Medicine Service, [address]  Securely message with the Vocera Web Console (learn more here)  Text page via [facility] Paging/Directory   Please see signed in provider for up to date coverage information  ______________________________________________________________________    Interval History   NAEON. Patient still not passing gas. NGT with suction currently, will undergo gastrografin testing later this afternoon. Mild pain, encouraged use of PRN's. No other concerns per nursing and patient.     Data reviewed today: I reviewed all medications, new labs  and imaging results over the last 24 hours.    Physical Exam   Vital Signs: Temp: 98.1  F (36.7  C) Temp src: Oral BP: 112/75 Pulse: 93   Resp: 16 SpO2: 96 % O2 Device: None (Room air)    Weight: 155 lbs 0 oz  Constitutional: Alert, cooperative, slight distress, and appears dishelveled  Eyes: Lids and lashes normal, pupils equal, round and reactive to light, extra ocular muscles intact, sclera clear, conjunctiva normal  ENT: Normocephalic, without obvious abnormality, atraumatic  Hematologic / Lymphatic: no cervical lymphadenopathy  Respiratory: No increased work of breathing, good air exchange, clear to auscultation bilaterally, no crackles or wheezing  Cardiovascular: Normal apical impulse, regular rate and rhythm, normal S1 and S2, no S3 or S4, and no murmur noted  GI: No scars, hyperactive bowel sounds, distended, tender especially in the epigastrum, no masses palpated  Skin: no bruising or bleeding, normal skin color, texture, turgor, no redness, warmth, or swelling  Musculoskeletal: There is no redness, warmth, or swelling of the joints. Motor strength is 5 out of 5 all extremities bilaterally.  Tone is normal.  Neurologic: Awake, alert, oriented to name, place and time.  Cranial nerves II-XII are grossly intact.  Neuropsychiatric: General: normal, calm and normal eye contact    Data   Recent Labs   Lab 02/19/22  0856 02/19/22  0502 02/19/22  0451 02/19/22  0023 02/18/22  1603   WBC  --   --  8.7  --  10.4   HGB  --   --  13.1*  --  13.3   MCV  --   --  87  --  82   PLT  --   --  334  --  458*   NA  --   --  136  --  131*   POTASSIUM  --   --  3.8  --  3.9   CHLORIDE  --   --  104  --  100   CO2  --   --  27  --  24   BUN  --   --  17  --  18   CR  --   --  0.58*  --  0.72   ANIONGAP  --   --  5  --  7   LINDA  --   --  9.2  --  9.6   * 263* 281*   < > 357*   ALBUMIN  --   --  3.1*  --  3.4   PROTTOTAL  --   --  7.4  --  8.3   BILITOTAL  --   --  0.6  --  0.7   ALKPHOS  --   --  102  --  112   ALT  --    --  16  --  17   AST  --   --  12  --  7   LIPASE  --   --   --   --  191    < > = values in this interval not displayed.     Recent Results (from the past 24 hour(s))   XR Abdomen 2 Views    Narrative    EXAMINATION:  XR ABDOMEN 2VIEWS 2/18/2022 5:53 PM     COMPARISON: Radiograph 1/23/2022 and CT 1/22/2022.    HISTORY: eval sbo    TECHNIQUE: Frontal upright and supine views of the abdomen.    FINDINGS: Retained contrast is present in the ureters and bladder. No  evidence of hydronephrosis. The urinary bladder is significantly  distended. Nonobstructive bowel gas pattern. No abnormally gaseous  distended loops of bowel, definite pneumatosis, or portal venous gas.  Visualized lung bases are clear. Penile prosthesis.       Impression    IMPRESSION: Nonobstructive bowel gas pattern. Significant distention  of the urinary bladder without apparent hydronephrosis.    I have personally reviewed the examination and initial interpretation  and I agree with the findings.    JANIE VANN MD         SYSTEM ID:  [id number]   US Abdomen Limited (RUQ)    Narrative    EXAM: Limited Abdominal Ultrasound, 2/18/2022     HISTORY: Epigastric pain.    COMPARISON: CT 1/22/2022.    FINDINGS:     Liver: 14.2 cm craniocaudally with normal echotexture. No focal mass.  The main portal vein is patent with antegrade flow.    Gallbladder: Echogenic, nonshadowing and nonmobile 5 x 4 mm focus  along the superior wall of the gallbladder. No mobile or shadowing  stones or pericholecystic fluid. Sonographic Mendes's sign: negative.    Bile Ducts: normal caliber.  CBD diameter: 3 mm.    Pancreas: visualized portions are unremarkable.    Kidney: 9.6 cm in length. No shadowing stones, hydronephrosis, mass or  suspicious cyst.    Ascites: none.    Visualized aorta and IVC are within normal limits.      Impression    IMPRESSION:   5mm gallbladder polyp. Otherwise normal sonographic evaluation of the  right upper quadrant without gallstones or evidence for  acute  cholecystitis.    I have personally reviewed the examination and initial interpretation  and I agree with the findings.    JANIE VANN MD         SYSTEM ID:  [id number]   CT Abdomen Pelvis w Contrast    Narrative    EXAM: CT ABDOMEN AND PELVIS WITH CONTRAST  LOCATION: [facility]  DATE/TIME: 02/19/2022, 5:22 AM    INDICATION: Bowel obstruction suspected.  COMPARISON: None.  TECHNIQUE: CT scan of the abdomen and pelvis was performed following injection of IV contrast. Multiplanar reformats were obtained. Dose reduction techniques were used.  CONTRAST: Iopamidol (isovue 370) solution 95 mL.    FINDINGS:   LOWER CHEST: Small hiatal hernia.    HEPATOBILIARY: Normal.    PANCREAS: Normal.    SPLEEN: Normal.    ADRENAL GLANDS: Normal.    KIDNEYS/BLADDER: Normal.    BOWEL: Dilated distended loops of small bowel measuring up to 5.2 cm with a short segment area of focal mural thickening. No free air or fluid.    LYMPH NODES: Normal.    VASCULATURE: Unremarkable.    PELVIC ORGANS: Prosthetic penile pump.    MUSCULOSKELETAL: Normal.      Impression    IMPRESSION:   1.  Dilated distended loop of small bowel with short segment area of focal mural thickening and no clearly delineated transition point, query focal enteritis, correlate for infectious, and inflammatory etiologies.  2.  No obstructing renal or ureteral stones. No hydroureteronephrosis.     XR Abdomen Port 1 View    Impression    RESIDENT PRELIMINARY INTERPRETATION  IMPRESSION: Nasogastric tube with tip and sidehole projecting in  stomach.

## 2022-02-19 NOTE — PROGRESS NOTES
Observation Unit Transfer Summary     Patient ID:  Andrew Lockwood  MRN: [id number]  54 year old  YOB: 1967    Observation Admit Date: 2/18/2022    ED Admitting Attending: HAO Olmedo    Transfer Date and Time: February 19, 2022 at 10:18 AM     Transferring Observation Provider: VIKASH Harris CNP    Admission Diagnoses:     1. Abdominal pain, epigastric        Transfer Diagnoses:    SBO    Emergency Department and Observation Course:   Andrew Lockwood is a 54 year old male admitted on 2/18/2022. He has a PMH of HIV/AIDS on HAART (undetectable viral load), CNS toxoplasmosis, DM2, s/p appendectomy, recurrent SBO (since 2016), s/p diagnostic laparoscopy x 2 with ANASTASIIA  (4/2018) who presented to the [place] [facility] for upper abdominal pain.       ##. Abdominal pain  ##. Nausea without vomiting   Reports having accidentally had a dairy product 2 days ago (reports being lactose intolerant) and immediately started having progressive cramping pain in LUQ/epigastrium, different from prior SBO as those are more lower left. Pain described as crampy, intermittent, without radiation, worsened by eating/drinking or position changes. Nausea no vomiting. Soft stool this morning. Passing flatus and belching. Denies dysuria, fever, constipation, diarrhea, hematochezia. Denies ETOH use[facility] CT scan that showed possible partial small bowel obstruction that was not felt to be consistent with presentation and d/c'ed with diagnosis of GERD. Admitted to [facility] 2/5-2/8/22 with SBO. Admitted to Medicine Service 1/22-1/25/22 with SBO; Surgery consulted and recommended diagnostic laparoscopy w/ ANASTASIIA but patient declined, improved and discharged. Some of his admissions with SBO have resolved with conservative management and other times has required NGT. Followed by colorectal surgery and GI with multidisciplinary work-up for recurrent SBO of unknown etiology and lost to follow up. In ED,  HR 79-97, -136/90-91, RR 16-18, SaO2 % on RA, Temp 98.2  F . Labs show CMP with Na 131, glucose 357 otherwise unremarkable. Normal lipase, CRP, CBC, lactic acid. UA with > 1000 glucose, 20 protein. CT abdomen pelvis w/o contrast reports nonobstructive bowel gas pattern, significant distention of the urinary bladder without apparent hydronephrosis. Abdominal US reports 5mm gallbladder polyp otherwise normal. Covid 19 PCR negative. In the ED the patient was given 1L NS bolus, GI cocktail, dilaudid 0.5mg IV x 3, zyprexa 5mg po x 1, zofran 4mg po x 1, protonix 40mg IV x 1. No prior EGD found in the past. Ddx includes gastritis, partial/early SBO, Covid related.   CT Abd/Pelvis Dilated distended loop of small bowel with short segment area of focal mural thickening and no clearly delineated transition point, query focal enteritis, correlate for infectious, and inflammatory etiologies. Surgery consulted and recommended NPO and NG.      ##. Type II Diabetes: Last A1c was 11.5 on 1/22/22. . Na 131 related to hyperglycemia. Corrected Na 137. At last discharge from here 1/25/22, Levemir was increased from 18U BID to 20U BID. When patient was admitted to [facility] on 2/5/22 he was on 10U BID noted as his home dose and discharged with it.   - Continue Levemir at 1/2 baseline dose - 10U BID as patient is NPO. At discharge resume 20U BID as BG elevated.   - Novolog medium resistance sliding scale q4h   - BG checks q4h  - Hypoglycemic protocol     ##. HIV/AIDS  ##. History of CNS toxoplasmosis  HIV RNA not detectable and CD4 count 519 on 8/24/21   -Continue PTA Bictarvy      ##. GERD: - Protonix 40mg IV BID     ##. Homeless: States he continues to be homeless however has a room at [facility].  - consult      ##. COVID19 Status: Covid19 vaccinated x 2. Positive Covid 19 on 1/4/22. Negative Covid 19 today. Reports completely asymptomatic.    At this time the patient has failed observation management due to SBO and  will be transferred to inpatient status.    Consults: general surgery    DATA:    Transfer Exam:    /75 (BP Location: Right arm)   Pulse 93   Temp 98.1  F (36.7  C) (Oral)   Resp 16   Wt 70.3 kg (155 lb)   SpO2 96%   BMI 26.61 kg/m    Constitutional: awake, alert, cooperative, no apparent distress, and appears stated age  Eyes: Lids and lashes normal, pupils equal, round and reactive to light, extra ocular muscles intact, sclera clear, conjunctiva normal  ENT: Normocephalic, without obvious abnormality, atraumatic, sinuses nontender on palpation, external ears without lesions, oral pharynx with moist mucous membranes, tonsils without erythema or exudates, gums normal and good dentition.  Hematologic / Lymphatic: no cervical lymphadenopathy  Respiratory: No increased work of breathing, good air exchange, clear to auscultation bilaterally, no crackles or wheezing  Cardiovascular: Normal apical impulse, regular rate and rhythm, normal S1 and S2, no S3 or S4, and no murmur noted  GI: No scars, normal bowel sounds, soft, non-distended, mild epigastric and LUQ tender, no masses palpated, no hepatosplenomegally  Skin: no bruising or bleeding  Musculoskeletal: There is no redness, warmth, or swelling of the joints.  Full range of motion noted.  Motor strength is 5 out of 5 all extremities bilaterally.  Tone is normal.  Neurologic: Awake, alert, oriented to name, place and time.  Cranial nerves II-XII are grossly intact.  Motor is 5 out of 5 bilaterally.  Cerebellar finger to nose, heel to shin intact.  Sensory is intact.  Babinski down going, Romberg negative, and gait is normal.  Neuropsychiatric: General: normal, calm and normal eye contact    Current Medications:    Current Outpatient Medications   Medication Sig Dispense Refill     bictegravir-emtricitabine-tenofovir (BIKTARVY) -25 MG per tablet Take 1 tablet by mouth daily 30 tablet 6     insulin detemir (LEVEMIR PEN) 100 UNIT/ML pen Inject 20 Units  Subcutaneous 2 times daily 15 mL 0     NOVOLOG FLEXPEN 100 UNIT/ML soln Inject 10 Units Subcutaneous 3 times daily (with meals) 75 mL 0     pantoprazole (PROTONIX) 40 MG EC tablet Take 1 tablet (40 mg) by mouth daily 30 tablet 6     polyethylene glycol (MIRALAX) 17 GM/Dose powder Take 17 g by mouth daily as needed 510 g 0     SENNA-docusate sodium (SENNA S) 8.6-50 MG tablet Take 1 tablet by mouth 2 times daily as needed (constipation) 30 tablet 0     Alcohol Swabs PADS Use to swab the area of the injection or todd as directed Per insurance coverage 100 each 0     blood glucose (NO BRAND SPECIFIED) test strip Use to test blood sugar 4 times daily or as directed. To accompany:whatever is covered 360 strip 3     blood glucose calibration (NO BRAND SPECIFIED) solution Used to calibrate the blood glucose monitor as needed and as directed.  To accompany  blood glucose brands per insurance coverage 1 each 0     blood glucose monitoring (NO BRAND SPECIFIED) meter device kit Use as directed. Per insurance coverage. 1 kit 0     glucose 40 % (400 mg/mL) gel 15 g every 15 minutes by mouth as needed for low blood sugar. Oral gel is preferable for conscious and able to swallow patient. 112.5 g 0     insulin pen needle (BD SCOTT U/F) 32G X 4 MM miscellaneous USE 3 TO 4 NEEDLES DAILY 400 each 3     Sharps Container MISC Use as directed to dispose of needles, lancets and other sharps. Per Insurance coverage 1 each 0     STATIN NOT PRESCRIBED (INTENTIONAL) Please choose reason not prescribed, below       thin (NO BRAND SPECIFIED) lancets Use with lanceting device. To accompany: Test 4 times daily with whatever is covered 360 each 3       Medications Prior to Admission:    (Not in a hospital admission)      Significant Diagnostic Studies:     Results for orders placed or performed during the hospital encounter of 02/18/22   XR Abdomen 2 Views     Status: None    Narrative    EXAMINATION:  XR ABDOMEN 2VIEWS 2/18/2022 5:53 PM      COMPARISON: Radiograph 1/23/2022 and CT 1/22/2022.    HISTORY: eval sbo    TECHNIQUE: Frontal upright and supine views of the abdomen.    FINDINGS: Retained contrast is present in the ureters and bladder. No  evidence of hydronephrosis. The urinary bladder is significantly  distended. Nonobstructive bowel gas pattern. No abnormally gaseous  distended loops of bowel, definite pneumatosis, or portal venous gas.  Visualized lung bases are clear. Penile prosthesis.       Impression    IMPRESSION: Nonobstructive bowel gas pattern. Significant distention  of the urinary bladder without apparent hydronephrosis.    I have personally reviewed the examination and initial interpretation  and I agree with the findings.    JANIE VANN MD         SYSTEM ID:  [id number]   US Abdomen Limited (RUQ)     Status: None    Narrative    EXAM: Limited Abdominal Ultrasound, 2/18/2022     HISTORY: Epigastric pain.    COMPARISON: CT 1/22/2022.    FINDINGS:     Liver: 14.2 cm craniocaudally with normal echotexture. No focal mass.  The main portal vein is patent with antegrade flow.    Gallbladder: Echogenic, nonshadowing and nonmobile 5 x 4 mm focus  along the superior wall of the gallbladder. No mobile or shadowing  stones or pericholecystic fluid. Sonographic Mendes's sign: negative.    Bile Ducts: normal caliber.  CBD diameter: 3 mm.    Pancreas: visualized portions are unremarkable.    Kidney: 9.6 cm in length. No shadowing stones, hydronephrosis, mass or  suspicious cyst.    Ascites: none.    Visualized aorta and IVC are within normal limits.      Impression    IMPRESSION:   5mm gallbladder polyp. Otherwise normal sonographic evaluation of the  right upper quadrant without gallstones or evidence for acute  cholecystitis.    I have personally reviewed the examination and initial interpretation  and I agree with the findings.    JANIE VANN MD         SYSTEM ID:  [id number]   CT Abdomen Pelvis w Contrast     Status: None    Narrative     EXAM: CT ABDOMEN AND PELVIS WITH CONTRAST  LOCATION: [facility]  DATE/TIME: 02/19/2022, 5:22 AM    INDICATION: Bowel obstruction suspected.  COMPARISON: None.  TECHNIQUE: CT scan of the abdomen and pelvis was performed following injection of IV contrast. Multiplanar reformats were obtained. Dose reduction techniques were used.  CONTRAST: Iopamidol (isovue 370) solution 95 mL.    FINDINGS:   LOWER CHEST: Small hiatal hernia.    HEPATOBILIARY: Normal.    PANCREAS: Normal.    SPLEEN: Normal.    ADRENAL GLANDS: Normal.    KIDNEYS/BLADDER: Normal.    BOWEL: Dilated distended loops of small bowel measuring up to 5.2 cm with a short segment area of focal mural thickening. No free air or fluid.    LYMPH NODES: Normal.    VASCULATURE: Unremarkable.    PELVIC ORGANS: Prosthetic penile pump.    MUSCULOSKELETAL: Normal.      Impression    IMPRESSION:   1.  Dilated distended loop of small bowel with short segment area of focal mural thickening and no clearly delineated transition point, query focal enteritis, correlate for infectious, and inflammatory etiologies.  2.  No obstructing renal or ureteral stones. No hydroureteronephrosis.     Comprehensive metabolic panel     Status: Abnormal   Result Value Ref Range    Sodium 131 (L) 133 - 144 mmol/L    Potassium 3.9 3.4 - 5.3 mmol/L    Chloride 100 94 - 109 mmol/L    Carbon Dioxide (CO2) 24 20 - 32 mmol/L    Anion Gap 7 3 - 14 mmol/L    Urea Nitrogen 18 7 - 30 mg/dL    Creatinine 0.72 0.66 - 1.25 mg/dL    Calcium 9.6 8.5 - 10.1 mg/dL    Glucose 357 (H) 70 - 99 mg/dL    Alkaline Phosphatase 112 40 - 150 U/L    AST 7 0 - 45 U/L    ALT 17 0 - 70 U/L    Protein Total 8.3 6.8 - 8.8 g/dL    Albumin 3.4 3.4 - 5.0 g/dL    Bilirubin Total 0.7 0.2 - 1.3 mg/dL    GFR Estimate >90 >60 mL/min/1.73m2   Lipase     Status: Normal   Result Value Ref Range    Lipase 191 73 - 393 U/L   CRP inflammation     Status: Normal   Result Value Ref Range    CRP  Inflammation 4.1 0.0 - 8.0 mg/L   CBC with platelets and differential     Status: Abnormal   Result Value Ref Range    WBC Count 10.4 4.0 - 11.0 10e3/uL    RBC Count 4.77 4.40 - 5.90 10e6/uL    Hemoglobin 13.3 13.3 - 17.7 g/dL    Hematocrit 39.1 (L) 40.0 - 53.0 %    MCV 82 78 - 100 fL    MCH 27.9 26.5 - 33.0 pg    MCHC 34.0 31.5 - 36.5 g/dL    RDW 14.0 10.0 - 15.0 %    Platelet Count 458 (H) 150 - 450 10e3/uL    % Neutrophils 69 %    % Lymphocytes 23 %    % Monocytes 5 %    % Eosinophils 1 %    % Basophils 1 %    % Immature Granulocytes 1 %    NRBCs per 100 WBC 0 <1 /100    Absolute Neutrophils 7.2 1.6 - 8.3 10e3/uL    Absolute Lymphocytes 2.4 0.8 - 5.3 10e3/uL    Absolute Monocytes 0.6 0.0 - 1.3 10e3/uL    Absolute Eosinophils 0.1 0.0 - 0.7 10e3/uL    Absolute Basophils 0.1 0.0 - 0.2 10e3/uL    Absolute Immature Granulocytes 0.1 <=0.4 10e3/uL    Absolute NRBCs 0.0 10e3/uL   Extra Red Top Tube     Status: None   Result Value Ref Range    Hold Specimen JIC    Extra Green Top (Lithium Heparin) Tube     Status: None   Result Value Ref Range    Hold Specimen JIC    Extra Purple Top Tube     Status: None   Result Value Ref Range    Hold Specimen JIC    Extra Blue Top Tube     Status: None   Result Value Ref Range    Hold Specimen JIC    UA with Microscopic reflex to Culture     Status: Abnormal    Specimen: Urine, Midstream   Result Value Ref Range    Color Urine Light Yellow Colorless, Straw, Light Yellow, Yellow    Appearance Urine Clear Clear    Glucose Urine >=1000 (A) Negative mg/dL    Bilirubin Urine Negative Negative    Ketones Urine Negative Negative mg/dL    Specific Gravity Urine 1.010 1.003 - 1.035    Blood Urine Negative Negative    pH Urine 6.0 5.0 - 7.0    Protein Albumin Urine 20  (A) Negative mg/dL    Urobilinogen Urine Normal Normal, 2.0 mg/dL    Nitrite Urine Negative Negative    Leukocyte Esterase Urine Negative Negative    RBC Urine 0 <=2 /HPF    WBC Urine 2 <=5 /HPF    Narrative    Urine Culture not  indicated   Lactic acid whole blood     Status: Normal   Result Value Ref Range    Lactic Acid 1.1 0.7 - 2.0 mmol/L   Troponin I     Status: Normal   Result Value Ref Range    Troponin I High Sensitivity <3 <79 ng/L   Hemoglobin A1c     Status: Abnormal   Result Value Ref Range    Hemoglobin A1C 11.2 (H) 0.0 - 5.6 %   Glucose by meter     Status: Abnormal   Result Value Ref Range    GLUCOSE BY METER POCT 212 (H) 70 - 99 mg/dL   Comprehensive metabolic panel     Status: Abnormal   Result Value Ref Range    Sodium 136 133 - 144 mmol/L    Potassium 3.8 3.4 - 5.3 mmol/L    Chloride 104 94 - 109 mmol/L    Carbon Dioxide (CO2) 27 20 - 32 mmol/L    Anion Gap 5 3 - 14 mmol/L    Urea Nitrogen 17 7 - 30 mg/dL    Creatinine 0.58 (L) 0.66 - 1.25 mg/dL    Calcium 9.2 8.5 - 10.1 mg/dL    Glucose 281 (H) 70 - 99 mg/dL    Alkaline Phosphatase 102 40 - 150 U/L    AST 12 0 - 45 U/L    ALT 16 0 - 70 U/L    Protein Total 7.4 6.8 - 8.8 g/dL    Albumin 3.1 (L) 3.4 - 5.0 g/dL    Bilirubin Total 0.6 0.2 - 1.3 mg/dL    GFR Estimate >90 >60 mL/min/1.73m2   CRP inflammation     Status: Normal   Result Value Ref Range    CRP Inflammation 5.6 0.0 - 8.0 mg/L   Lactic acid whole blood     Status: Normal   Result Value Ref Range    Lactic Acid 0.8 0.7 - 2.0 mmol/L   Magnesium     Status: Abnormal   Result Value Ref Range    Magnesium 1.7 (L) 1.8 - 2.6 mg/dL   Phosphorus     Status: Normal   Result Value Ref Range    Phosphorus 3.2 2.5 - 4.5 mg/dL   CBC with platelets and differential     Status: Abnormal   Result Value Ref Range    WBC Count 8.7 4.0 - 11.0 10e3/uL    RBC Count 4.70 4.40 - 5.90 10e6/uL    Hemoglobin 13.1 (L) 13.3 - 17.7 g/dL    Hematocrit 40.8 40.0 - 53.0 %    MCV 87 78 - 100 fL    MCH 27.9 26.5 - 33.0 pg    MCHC 32.1 31.5 - 36.5 g/dL    RDW 14.2 10.0 - 15.0 %    Platelet Count 334 150 - 450 10e3/uL    % Neutrophils 62 %    % Lymphocytes 27 %    % Monocytes 7 %    % Eosinophils 2 %    % Basophils 1 %    % Immature Granulocytes 1 %     NRBCs per 100 WBC 0 <1 /100    Absolute Neutrophils 5.5 1.6 - 8.3 10e3/uL    Absolute Lymphocytes 2.3 0.8 - 5.3 10e3/uL    Absolute Monocytes 0.6 0.0 - 1.3 10e3/uL    Absolute Eosinophils 0.2 0.0 - 0.7 10e3/uL    Absolute Basophils 0.1 0.0 - 0.2 10e3/uL    Absolute Immature Granulocytes 0.1 <=0.4 10e3/uL    Absolute NRBCs 0.0 10e3/uL   Glucose by meter     Status: Abnormal   Result Value Ref Range    GLUCOSE BY METER POCT 263 (H) 70 - 99 mg/dL   RBC and Platelet Morphology     Status: None   Result Value Ref Range    Platelet Assessment  Automated Count Confirmed. Platelet morphology is normal.     Automated Count Confirmed. Platelet morphology is normal.    RBC Morphology Confirmed RBC Indices    Asymptomatic Influenza A/B & SARS-CoV2 (COVID-19) Virus PCR Multiplex Nasopharyngeal     Status: Normal    Specimen: Nasopharyngeal; Swab   Result Value Ref Range    Influenza A PCR Negative Negative    Influenza B PCR Negative Negative    RSV PCR Negative Negative    SARS CoV2 PCR Negative Negative, Testing sent to reference lab. Results will be returned via unsolicited result    Narrative    Testing was performed using the Xpert Xpress CoV2/Flu/RSV Assay on the [employer] GeneXpert Instrument. This test should be ordered for the detection of SARS-CoV-2 and influenza viruses in individuals who meet clinical and/or epidemiological criteria. Test performance is unknown in asymptomatic patients. This test is for in vitro diagnostic use under the FDA EUA for laboratories certified under CLIA to perform high or moderate complexity testing. This test has not been FDA cleared or approved. A negative result does not rule out the presence of PCR inhibitors in the specimen or target RNA in concentration below the limit of detection for the assay. If only one viral target is positive but coinfection with multiple targets is suspected, the sample should be re-tested with another FDA cleared, approved, or authorized test, if  coinfection would change clinical management. This test was validated by the [facility]. These laboratories are certified under the Clinical  Laboratory Improvement Amendments of 1988 (CLIA-88) as qualified to perform high complexity laboratory testing.   Glucose by meter     Status: Abnormal   Result Value Ref Range    GLUCOSE BY METER POCT 265 (H) 70 - 99 mg/dL   EKG 12-lead, tracing only     Status: None   Result Value Ref Range    Systolic Blood Pressure  mmHg    Diastolic Blood Pressure  mmHg    Ventricular Rate 91 BPM    Atrial Rate 91 BPM    HI Interval 126 ms    QRS Duration 88 ms     ms    QTc 442 ms    P Axis 33 degrees    R AXIS 33 degrees    T Axis 32 degrees    Interpretation ECG       Sinus rhythm  Possible Lateral infarct , age undetermined  Abnormal ECG  Unconfirmed report - interpretation of this ECG is computer generated - see medical record for final interpretation    Confirmed by - EMERGENCY ROOM, PHYSICIAN ([id number]),  UMAIR NAJERA ([id number]) on 2/18/2022 7:18:49 PM     Surgery General Adult IP Consult: Patient to be seen: Routine within 24 hrs; Call back #: [id number]; recurrent SBO, p/w abdominal pain. CT AP x 2 no clear transition point or SBO. ongoing pain, increased this AM. please evaluate for further recs.; Con...     Status: None ()    Narrative    Lam Luo MD     2/19/2022  8:48 AM  [facility] Surgery Consultation    Andrew Lockwood MRN# [id number]   Age: 54 year old YOB: 1967     Date of Admission:  2/18/2022    Date of Consult:   2/18/22    Reason for consult: SBO       Requesting service: Medicine; requesting provider: Dr. Zaire Oropeza                   Assessment and Plan:   Assessment:   54yoM with history of HIV and recurrent SBO presenting with SBO.        Plan:   -NPO  -NG tube  -Gastrograffin challenge after 6 hours of NG decompression.    Discussed with staff, Dr. Vaz            Chief Complaint:   SBO         History  of Present Illness:     54yoM with history of HIV and multiple prior small bowel   obstructions presenting with two days of abdominal pain and   obstipation.  He states that he has ongoing moderate cramping   abdominal pain primarily located in the upper abdomen.  This   presentation is similar to his prior SBO which was managed   nonoperatively one month ago.  He states he last passed flatus or   had a bowel movement two days ago.  He denies fevers or chills.    A CT scan was obtained in the ED with multiple loops of dilated   small bowel and focal mural thickening concerning for enteritis            Past Medical History:     Past Medical History:   Diagnosis Date     AIDS (H)      Allergic rhinitis due to other allergen     DNS     Anal dysplasia      Chronic abdominal pain      CNS toxoplasmosis (H)      Diabetes type 2, controlled (H)      GERD (gastroesophageal reflux disease)      History of seizure      History of substance abuse (H)      HIV (human immunodeficiency virus infection) (H)      HLD (hyperlipidemia)      Lung nodules      Periungual wart      PTSD (post-traumatic stress disorder)      Sleep apnea     doesn't use CPAP             Past Surgical History:     Past Surgical History:   Procedure Laterality Date     ANOSCOPY N/A 9/9/2020    Procedure: Exam Under Anesthesia, ANOSCOPY, fulgeration of   rectal fissures with Rectal Biopsies;  Surgeon: Thanh Lundberg MD;  Location: [facility] OR     COLONOSCOPY Left 1/22/2016    Procedure: COMBINED COLONOSCOPY, SINGLE OR MULTIPLE   BIOPSY/POLYPECTOMY BY BIOPSY;  Surgeon: Clark Saini MD;    Location: [facility] GI     HC EXPLORE UNDESC TESTIS,INGUIN/SCROTAL       LAPAROSCOPIC APPENDECTOMY N/A 1/31/2018    Procedure: LAPAROSCOPIC APPENDECTOMY;  LAPAROSCOPIC   APPENDECTOMY;  Surgeon: Dawn Holt MD;    Location: [facility] OR     LAPAROSCOPY DIAGNOSTIC (GENERAL) N/A 7/26/2016    Procedure: LAPAROSCOPY DIAGNOSTIC (GENERAL);  Surgeon: Susannah Arriaga  "MD Christin;  Location: [facility] OR     LAPAROSCOPY DIAGNOSTIC (GENERAL) N/A 4/16/2018    Procedure: LAPAROSCOPY DIAGNOSTIC (GENERAL);  Diagnostic   laparoscopy and lysis of adhesions;  Surgeon: Prince Dowling MD;  Location: [facility] OR     OPTICAL TRACKING SYSTEM CRANIOTOMY, EXCISE TUMOR, COMBINED Left   4/10/2015    Procedure: COMBINED OPTICAL TRACKING SYSTEM CRANIOTOMY, EXCISE   TUMOR;  Surgeon: Mirlande Colmenares MD;  Location: [facility] OR     REPAIR GAMEKEEPER'S THUMB Right 12/2/2016    Procedure: REPAIR LIGAMENT ULNAR COLLATERAL THUMB   (GAMEKEEPER'S);  Surgeon: Evin Zamorano MD;  Location:   [facility] OR     ZC NONSPECIFIC PROCEDURE      right forearm fracture             Social History:     Social History     Tobacco Use     Smoking status: Current Every Day Smoker     Packs/day: 0.25     Years: 40.00     Pack years: 10.00     Types: Cigarettes     Smokeless tobacco: Former User   Substance Use Topics     Alcohol use: No     Alcohol/week: 0.0 standard drinks     Comment: Last etoh in 2007             Family History:     Family History   Problem Relation Age of Onset     Diabetes Brother      Diabetes Father      Alzheimer Disease Father      Unknown/Adopted Mother      Diabetes Paternal Grandfather      Cancer No family hx of         no skin cancer     Skin Cancer No family hx of         no famiy hx of skin cancer     Glaucoma No family hx of      Macular Degeneration No family hx of                 Allergies:     Allergies   Allergen Reactions     Metformin      Abdominal pain     Tylenol [Acetaminophen] Itching     Dulaglutide Rash     Insulin Lispro Rash     Patient reported     Penicillin V Other (See Comments) and Rash     Diffuse maculopapular rash + feels \"high\", per pt.              Medications:     Current Facility-Administered Medications   Medication     bictegravir-emtricitabine-tenofovir (BIKTARVY) -25 MG per   tablet 1 tablet     glucose gel 15-30 g    Or     dextrose 50 % injection 25-50 mL    Or "     glucagon injection 1 mg     insulin aspart (NovoLOG) injection (RAPID ACTING)     insulin detemir (LEVEMIR PEN) injection 10 Units     lactated ringers infusion     melatonin tablet 1 mg     ondansetron (ZOFRAN-ODT) ODT tab 4 mg    Or     ondansetron (ZOFRAN) injection 4 mg     pantoprazole (PROTONIX) IV push injection 40 mg     polyethylene glycol (MIRALAX) Packet 17 g     prochlorperazine (COMPAZINE) injection 10 mg    Or     prochlorperazine (COMPAZINE) tablet 10 mg    Or     prochlorperazine (COMPAZINE) suppository 25 mg     senna-docusate (SENOKOT-S/PERICOLACE) 8.6-50 MG per tablet 1   tablet     Current Outpatient Medications   Medication Sig     Alcohol Swabs PADS Use to swab the area of the injection or   todd as directed Per insurance coverage     bictegravir-emtricitabine-tenofovir (BIKTARVY) -25 MG per   tablet Take 1 tablet by mouth daily     blood glucose (NO BRAND SPECIFIED) test strip Use to test blood   sugar 4 times daily or as directed. To accompany:whatever is   covered     blood glucose calibration (NO BRAND SPECIFIED) solution Used to   calibrate the blood glucose monitor as needed and as directed.    To accompany  blood glucose brands per insurance coverage     blood glucose monitoring (NO BRAND SPECIFIED) meter device kit   Use as directed. Per insurance coverage.     glucose 40 % (400 mg/mL) gel 15 g every 15 minutes by mouth as   needed for low blood sugar. Oral gel is preferable for conscious   and able to swallow patient.     insulin detemir (LEVEMIR PEN) 100 UNIT/ML pen Inject 20 Units   Subcutaneous 2 times daily     insulin pen needle (BD SCOTT U/F) 32G X 4 MM miscellaneous USE 3   TO 4 NEEDLES DAILY     NOVOLOG FLEXPEN 100 UNIT/ML soln Inject 10 Units Subcutaneous 3   times daily (with meals)     pantoprazole (PROTONIX) 40 MG EC tablet Take 1 tablet (40 mg)   by mouth daily     polyethylene glycol (MIRALAX) 17 GM/Dose powder Take 17 g by   mouth daily as needed      SENNA-docusate sodium (SENNA S) 8.6-50 MG tablet Take 1 tablet   by mouth 2 times daily as needed (constipation)     Sharps Container MISC Use as directed to dispose of needles,   lancets and other sharps. Per Insurance coverage     STATIN NOT PRESCRIBED (INTENTIONAL) Please choose reason not   prescribed, below     thin (NO BRAND SPECIFIED) lancets Use with lanceting device. To   accompany: Test 4 times daily with whatever is covered               Review of Systems:   A complete review of systems was obtained and was negative except   as stated above in the HPI            Physical Exam:   All vitals have been reviewed  Temp:  [98.2  F (36.8  C)] 98.2  F (36.8  C)  Pulse:  [81-97] 81  Resp:  [16-18] 18  BP: (121-136)/(85-91) 121/85  SpO2:  [97 %-100 %] 98 %  No intake or output data in the 24 hours ending 02/19/22 0755  Physical Exam:  Gen: NAD  HEENT: Normocephalic, atraumatic  CV: RRR  Abdomen: soft, moderately distended, tympanitic, minimally tender   to palpation  Ext: WWP  Skin: No erythema  Neuro: cranial nerves II-XII grossly intact  Psych: Alert, appropriate            Data:   All laboratory data reviewed    Results:  BMP  Recent Labs   Lab 02/19/22  0502 02/19/22  0451 02/19/22  0023 02/18/22  1603   NA  --  136  --  131*   POTASSIUM  --  3.8  --  3.9   CHLORIDE  --  104  --  100   CO2  --  27  --  24   BUN  --  17  --  18   CR  --  0.58*  --  0.72   * 281* 212* 357*     CBC  Recent Labs   Lab 02/19/22  0451 02/18/22  1603   WBC 8.7 10.4   HGB 13.1* 13.3    458*     LFT  Recent Labs   Lab 02/19/22  0451 02/18/22  1603   AST 12 7   ALT 16 17   ALKPHOS 102 112   BILITOTAL 0.6 0.7   ALBUMIN 3.1* 3.4     Recent Labs   Lab 02/19/22  0502 02/19/22  0451 02/19/22  0023 02/18/22  1603   * 281* 212* 357*       Imaging:    CT:   FINDINGS:   LOWER CHEST: Small hiatal hernia.     HEPATOBILIARY: Normal.     PANCREAS: Normal.     SPLEEN: Normal.     ADRENAL GLANDS: Normal.     KIDNEYS/BLADDER:  Normal.     BOWEL: Dilated distended loops of small bowel measuring up to 5.2   cm with a short segment area of focal mural thickening. No free   air or fluid.     LYMPH NODES: Normal.     VASCULATURE: Unremarkable.     PELVIC ORGANS: Prosthetic penile pump.     MUSCULOSKELETAL: Normal.                                                                      IMPRESSION:   1.  Dilated distended loop of small bowel with short segment area   of focal mural thickening and no clearly delineated transition   point, query focal enteritis, correlate for infectious, and   inflammatory etiologies.  2.  No obstructing renal or ureteral stones. No   hydroureteronephrosis.         Lam Luo MD               CBC with platelets differential     Status: Abnormal    Narrative    The following orders were created for panel order CBC with platelets differential.  Procedure                               Abnormality         Status                     ---------                               -----------         ------                     CBC with platelets and d...[[id number]]  Abnormal            Final result                 Please view results for these tests on the individual orders.   [place] Draw     Status: None    Narrative    The following orders were created for panel order [place] Draw.  Procedure                               Abnormality         Status                     ---------                               -----------         ------                     Extra Red Top Tube[[id number]]                               Final result               Extra Green Top (Lithium...[[id number]]                      Final result               Extra Purple Top Tube[[id number]]                            Final result                 Please view results for these tests on the individual orders.   [place] Draw     Status: None    Narrative    The following orders were created for panel order [place] Draw.  Procedure                                Abnormality         Status                     ---------                               -----------         ------                     Extra Blue Top Tube[[id number]]                              Final result                 Please view results for these tests on the individual orders.   [place] Draw *Canceled*     Status: None ()    Narrative    The following orders were created for panel order [place] Draw.  Procedure                               Abnormality         Status                     ---------                               -----------         ------                       Please view results for these tests on the individual orders.   CBC with Platelets & Differential     Status: Abnormal    Narrative    The following orders were created for panel order CBC with Platelets & Differential.  Procedure                               Abnormality         Status                     ---------                               -----------         ------                     CBC with platelets and d...[[id number]]  Abnormal            Final result               RBC and Platelet Morphology[[id number]]                      Final result                 Please view results for these tests on the individual orders.       Signed:  VIKASH Harris CNP  February 19, 2022 at 10:18 AM

## 2022-02-19 NOTE — H&P
[facility]    History and Physical - Hospitalist Service       Date of Admission:  2/18/2022    Assessment & Plan      Andrew Lockwood is a 54 year old male admitted on 2/18/2022. He has a PMH of HIV/AIDS on HAART (undetectable viral load), CNS toxoplasmosis, DM2, s/p appendectomy, recurrent SBO (since 2016), s/p diagnostic laparoscopy x 2 with ANASTASIIA  (4/2018) who presented to the [facility] for upper abdominal pain.      ##. Abdominal pain  ##. Nausea without vomiting   Reports having accidentally had a dairy product 2 days ago (reports being lactose intolerant) and immediately started having progressive cramping pain in LUQ/epigastrium, different from prior SBO as those are more lower left. Pain described as crampy, intermittent, without radiation, worsened by eating/drinking or position changes. Nausea no vomiting. Soft stool this morning. Passing flatus and belching. Denies dysuria, fever, constipation, diarrhea, hematochezia. Denies ETOH use. [facility] CT scan that showed possible partial small bowel obstruction that was not felt to be consistent with presentation and d/c'ed with diagnosis of GERD. Ad[facility] to [facility] 2[facility]2/8/22 with SBO. Admitted to Medicine Service 1/22-1/25/22 with SBO; Surgery consulted and recommended diagnostic laparoscopy w/ ANASTASIIA but patient declined, improved and discharged. Some of his admissions with SBO have resolved with conservative management and other times has required NGT. Followed by colorectal surgery and GI with multidisciplinary work-up for recurrent SBO of unknown etiology and lost to follow up. In ED, HR 79-97, -136/90-91, RR 16-18, SaO2 % on RA, Temp 98.2  F . Labs show CMP with Na 131, glucose 357 otherwise unremarkable. Normal lipase, CRP, CBC, lactic acid. UA with > 1000 glucose, 20 protein. CT abdomen pelvis w/o contrast reports nonobstructive bowel gas pattern, significant distention  of the urinary bladder without apparent hydronephrosis. Abdominal US reports 5mm gallbladder polyp otherwise normal. Covid 19 PCR negative. In the ED the patient was given 1L NS bolus, GI cocktail, dilaudid 0.5mg IV x 3, zyprexa 5mg po x 1, zofran 4mg po x 1, protonix 40mg IV x 1. No prior EGD found in the past. Ddx includes gastritis, partial/early SBO, Covid related  -Serial abdominal exams  -NPO  -MIVF w/ LR 125ml/hr  -Zofran and Compazine prn nausea  -Protonix 40mg IV BID  -Intermittent small doses of dilaudid for pain. Advised patient that we will use it minimally as still concerned about SBO considering patients history  -CT Abd/Pelvis if patients pain worsens  -Repeat CBC, CMP, Mag, Phos, Lactate in AM  -Check H. Pylori stool ag     ##. Type II Diabetes: Last A1c was 11.5 on 1/22/22. . Na 131 related to hyperglycemia. Corrected Na 137. At last discharge from here 1/25/22, Levemir was increased from 18U BID to 20U BID. When patient was admitted to [facility] on 2/5/22 he was on 10U BID noted as his home dose and discharged with it.   - Continue Levemir at 1/2 baseline dose - 10U BID as patient is NPO. At discharge resume 20U BID as BG elevated.   - Novolog medium resistance sliding scale q4h   - BG checks q4h  - Hypoglycemic protocol     ##. HIV/AIDS  ##. History of CNS toxoplasmosis  HIV RNA not detectable and CD4 count 519 on 8/24/21   -Continue PTA Bictarvy      ##. GERD: - Protonix 40mg IV BID     ##. Homeless: States he continues to be homeless however has a room at [facility].  - consult     ##. COVID19 Status: Covid19 vaccinated x 2. Positive Covid 19 on 1/4/22. Negative Covid 19 today. Reports completely asymptomatic.      Diet: NPO for Medical/Clinical Reasons Except for: Ice Chips  DVT Prophylaxis: Pneumatic Compression Devices  Potts Catheter: Not present  Central Lines: None  Cardiac Monitoring: None  Code Status: Full Code    Clinically Significant Risk Factors Present on Admission        "  # Hyponatremia: Na = 131 mmol/L (Ref range: 133 - 144 mmol/L) on admission, will monitor as appropriate         # Overweight: Estimated body mass index is 26.61 kg/m  as calculated from the following:    Height as of 1/22/22: 1.626 m (5' 4\").    Weight as of this encounter: 70.3 kg (155 lb).      Disposition Plan   Expected Discharge:    Anticipated discharge location:  Awaiting care coordination huddle  Delays:          The patient's care was discussed with the Attending Physician, Dr. Oropeza.    HAO Olmedo  Hospitalist Service  [facility]  Securely message with the Vocera Web Console (learn more here)  Text page via [facility] Paging/Directory   ______________________________________________________________________    Chief Complaint   Abdominal pain     History is obtained from the patient    History of Present Illness   Andrew Lockwood is a 54 year old male w/ PMH of HIV/AIDS on HAART (undetectable viral load), CNS toxoplasmosis, DM2, s/p appendectomy, recurrent SBO (since 2016), s/p diagnostic laparoscopy x 2 with ANASTASIIA  (4/2018) who presented to the [facility] for upper abdominal pain.  Patient reports being lactose intolerant and accidentally having dairy product 2 days ago. He immediately started having progressive cramping pain in his LUQ and epigastrium. He states this pain is different from his prior  bowel obstructions as those are more lower on the left side. Pain is described as crampy, intermittent, without radiation. It feels \"like diarrhea cramps\" except patient denies having diarrhea. He states he is never had pain like this in the past. Pain is worsened by eating or drinking or position changes. Has nausea but not vomiting. Had a very soft stool this morning. Denies dysuria, fever or diarrhea. States he is passing gas and belching. Again with bowel obstructions he is usually not passing gas and has emesis. Admits to " occasional marijuana use and tobacco use. Denies ETOH use.     Per chart review patient was seen at [facility] this morning and had a CT scan that showed possible partial small bowel obstruction that was not felt to be consistent with presentation so he was discharged with diagnosis of GERD. Patient states pain worsened since this visit. He was admitted to [facility] on 2/5-2/8/22 with SBO. Prior to that he was admitted here on the Medicine Service 1/22-1/25/22, 1/12-1/14/22, twice in December 2021 as well as many more prior to that. At his last admission here on 1/22/22, Surgery was consulted and recommended diagnostic laparoscopy w/ ANASTASIIA however patient improved and was discharged. Some of his admissions with SBO have resolved with conservative management and other times has required NGT.     Had been followed by colorectal surgery and gastroenterology teams with multidisciplinary work-up for recurrent SBO of unknown etiology. Patient was lost to follow-up around 2020 when considering repeat MRE in the course of his work-up. He has not followed up with Referrals. Has had several colonoscopies however EGD cannot be found in the system or care everywhere.    In the ED, HR 79-97, -136/90-91, RR 16-18, SaO2 % on RA, Temp 98.2  F . Labs show CMP with Na 131, glucose 357 otherwise unremarkable. Normal lipase, CRP, CBC, lactic acid. UA with > 1000 glucose, 20 protein. CT abdomen pelvis w/o contrast reports nonobstructive bowel gas pattern, significant distention  of the urinary bladder without apparent hydronephrosis. Abdominal US reports 5mm gallbladder polyp otherwise normal. Covid 19 PCR negative. In the ED the patient was given 1L NS bolus, GI cocktail, dilaudid 0.5mg IV x 3, zyprexa 5mg po x 1, zofran 4mg po x 1, protonix 40mg IV x 1.     Review of Systems    All other ROS negative except those mentioned in above note.      Past Medical History    I have reviewed this patient's medical history and updated it with  pertinent information if needed.   Past Medical History:   Diagnosis Date     AIDS (H)      Allergic rhinitis due to other allergen     DNS     Anal dysplasia      Chronic abdominal pain      CNS toxoplasmosis (H)      Diabetes type 2, controlled (H)      GERD (gastroesophageal reflux disease)      History of seizure      History of substance abuse (H)      HIV (human immunodeficiency virus infection) (H)      HLD (hyperlipidemia)      Lung nodules      Periungual wart      PTSD (post-traumatic stress disorder)      Sleep apnea     doesn't use CPAP       Past Surgical History   I have reviewed this patient's surgical history and updated it with pertinent information if needed.  Past Surgical History:   Procedure Laterality Date     ANOSCOPY N/A 9/9/2020    Procedure: Exam Under Anesthesia, ANOSCOPY, fulgeration of rectal fissures with Rectal Biopsies;  Surgeon: Thanh Lundberg MD;  Location: [facility] OR     COLONOSCOPY Left 1/22/2016    Procedure: COMBINED COLONOSCOPY, SINGLE OR MULTIPLE BIOPSY/POLYPECTOMY BY BIOPSY;  Surgeon: Clark Saini MD;  Location: [facility] GI     HC EXPLORE UNDESC TESTIS,INGUIN/SCROTAL       LAPAROSCOPIC APPENDECTOMY N/A 1/31/2018    Procedure: LAPAROSCOPIC APPENDECTOMY;  LAPAROSCOPIC APPENDECTOMY;  Surgeon: Dawn Holt MD;  Location: [facility] OR     LAPAROSCOPY DIAGNOSTIC (GENERAL) N/A 7/26/2016    Procedure: LAPAROSCOPY DIAGNOSTIC (GENERAL);  Surgeon: Susannah Arriaga MD;  Location: [facility] OR     LAPAROSCOPY DIAGNOSTIC (GENERAL) N/A 4/16/2018    Procedure: LAPAROSCOPY DIAGNOSTIC (GENERAL);  Diagnostic laparoscopy and lysis of adhesions;  Surgeon: Prince Dowling MD;  Location: [facility] OR     OPTICAL TRACKING SYSTEM CRANIOTOMY, EXCISE TUMOR, COMBINED Left 4/10/2015    Procedure: COMBINED OPTICAL TRACKING SYSTEM CRANIOTOMY, EXCISE TUMOR;  Surgeon: Mirlande Colmenares MD;  Location: [facility] OR     REPAIR GAMEKEEPER'S THUMB Right 12/2/2016    Procedure: REPAIR LIGAMENT ULNAR COLLATERAL THUMB  (GAMEKEANDRE'S);  Surgeon: Evin Zamorano MD;  Location:  OR     [facility] NONSPECIFIC PROCEDURE      right forearm fracture       Social History   I have reviewed this patient's social history and updated it with pertinent information if needed.  Social History     Tobacco Use     Smoking status: Current Every Day Smoker     Packs/day: 0.25     Years: 40.00     Pack years: 10.00     Types: Cigarettes     Smokeless tobacco: Former User   Substance Use Topics     Alcohol use: No     Alcohol/week: 0.0 standard drinks     Comment: Last etoh in 2007     Drug use: Not Currently     Types: Marijuana, Methamphetamines       Family History   I have reviewed this patient's family history and updated it with pertinent information if needed.  Family History   Problem Relation Age of Onset     Diabetes Brother      Diabetes Father      Alzheimer Disease Father      Unknown/Adopted Mother      Diabetes Paternal Grandfather      Cancer No family hx of         no skin cancer     Skin Cancer No family hx of         no famiy hx of skin cancer     Glaucoma No family hx of      Macular Degeneration No family hx of        Prior to Admission Medications   Prior to Admission Medications   Prescriptions Last Dose Informant Patient Reported? Taking?   Alcohol Swabs PADS  Self No No   Sig: Use to swab the area of the injection or todd as directed Per insurance coverage   NOVOLOG FLEXPEN 100 UNIT/ML soln   No No   Sig: Inject 10 Units Subcutaneous 3 times daily (with meals)   SENNA-docusate sodium (SENNA S) 8.6-50 MG tablet  Self No No   Sig: Take 1 tablet by mouth 2 times daily as needed (constipation)   STATIN NOT PRESCRIBED (INTENTIONAL)  Self No No   Sig: Please choose reason not prescribed, below   Sharps Container MISC  Self No No   Sig: Use as directed to dispose of needles, lancets and other sharps. Per Insurance coverage   bictegravir-emtricitabine-tenofovir (BIKTARVY) -25 MG per tablet  Self No No   Sig: Take 1 tablet by  "mouth daily   blood glucose (NO BRAND SPECIFIED) test strip  Self No No   Sig: Use to test blood sugar 4 times daily or as directed. To accompany:whatever is covered   blood glucose calibration (NO BRAND SPECIFIED) solution  Self No No   Sig: Used to calibrate the blood glucose monitor as needed and as directed.  To accompany  blood glucose brands per insurance coverage   blood glucose monitoring (NO BRAND SPECIFIED) meter device kit  Self No No   Sig: Use as directed. Per insurance coverage.   glucose 40 % (400 mg/mL) gel  Self No No   Sig: 15 g every 15 minutes by mouth as needed for low blood sugar. Oral gel is preferable for conscious and able to swallow patient.   insulin detemir (LEVEMIR PEN) 100 UNIT/ML pen   No No   Sig: Inject 20 Units Subcutaneous 2 times daily   insulin pen needle (BD SCOTT U/F) 32G X 4 MM miscellaneous  Self No No   Sig: USE 3 TO 4 NEEDLES DAILY   pantoprazole (PROTONIX) 40 MG EC tablet  Self No No   Sig: Take 1 tablet (40 mg) by mouth daily   polyethylene glycol (MIRALAX) 17 GM/Dose powder  Self No No   Sig: Take 17 g by mouth daily as needed   thin (NO BRAND SPECIFIED) lancets  Self No No   Sig: Use with lanceting device. To accompany: Test 4 times daily with whatever is covered      Facility-Administered Medications: None     Allergies   Allergies   Allergen Reactions     Metformin      Abdominal pain     Tylenol [Acetaminophen] Itching     Dulaglutide Rash     Insulin Lispro Rash     Patient reported     Penicillin V Other (See Comments) and Rash     Diffuse maculopapular rash + feels \"high\", per pt.        Physical Exam   Vital Signs: Temp: 98.2  F (36.8  C) Temp src: Oral BP: 136/89 Pulse: 82   Resp: 18 SpO2: 99 % O2 Device: None (Room air)    Weight: 155 lbs 0 oz    Constitutional: awake, alert, cooperative, no apparent distress, and appears stated age  Eyes: Lids and lashes normal, pupils equal, round and reactive to light, extra ocular muscles intact, sclera clear, conjunctiva " normal  ENT: Normocephalic, without obvious abnormality, atraumatic, sinuses nontender on palpation, external ears without lesions, oral pharynx with moist mucous membranes, tonsils without erythema or exudates, gums normal and good dentition.  Hematologic / Lymphatic: no cervical lymphadenopathy  Respiratory: No increased work of breathing, good air exchange, clear to auscultation bilaterally, no crackles or wheezing  Cardiovascular: Normal apical impulse, regular rate and rhythm, normal S1 and S2, no S3 or S4, and no murmur noted  GI: No scars, normal bowel sounds, soft, non-distended, mild epigastric and LUQ tender, no masses palpated, no hepatosplenomegally  Skin: no bruising or bleeding  Musculoskeletal: There is no redness, warmth, or swelling of the joints.  Full range of motion noted.  Motor strength is 5 out of 5 all extremities bilaterally.  Tone is normal.  Neurologic: Awake, alert, oriented to name, place and time.  Cranial nerves II-XII are grossly intact.  Motor is 5 out of 5 bilaterally.  Cerebellar finger to nose, heel to shin intact.  Sensory is intact.  Babinski down going, Romberg negative, and gait is normal.  Neuropsychiatric: General: normal, calm and normal eye contact      Data   Data reviewed today: I reviewed all medications, new labs and imaging results over the last 24 hours. I personally reviewed     Recent Labs   Lab 02/19/22  0023 02/18/22  1603   WBC  --  10.4   HGB  --  13.3   MCV  --  82   PLT  --  458*   NA  --  131*   POTASSIUM  --  3.9   CHLORIDE  --  100   CO2  --  24   BUN  --  18   CR  --  0.72   ANIONGAP  --  7   LINDA  --  9.6   * 357*   ALBUMIN  --  3.4   PROTTOTAL  --  8.3   BILITOTAL  --  0.7   ALKPHOS  --  112   ALT  --  17   AST  --  7   LIPASE  --  191     Most Recent 3 CBC's:  Recent Labs   Lab Test 02/18/22  1603 01/25/22  0625 01/24/22  1003 01/23/22  0600   WBC 10.4  --  7.4 8.4   HGB 13.3  --  11.5* 12.6*   MCV 82  --  85 85   * 309 293 342     Most  Recent 3 BMP's:  Recent Labs   Lab Test 02/19/22  0023 02/18/22  1603 01/25/22  1221 01/25/22  0039 01/24/22 2052 01/24/22  1134 01/24/22  1003 01/23/22  1224 01/23/22  1135 01/23/22  0659 01/23/22  0600   NA  --  131*  --   --   --   --  133  --   --   --  138   POTASSIUM  --  3.9  --   --   --   --  3.4  --  3.7  --  3.7   CHLORIDE  --  100  --   --   --   --  102  --   --   --  104   CO2  --  24  --   --   --   --  22  --   --   --  26   BUN  --  18  --   --   --   --  7  --   --   --  12   CR  --  0.72  --   --  0.68  --  0.70  --   --   --  0.67   ANIONGAP  --  7  --   --   --   --  9  --   --   --  8   LINDA  --  9.6  --   --   --   --  8.7  --   --   --  8.8   * 357* 271*   < >  --    < > 323*   < >  --    < > 272*    < > = values in this interval not displayed.     Most Recent 2 LFT's:  Recent Labs   Lab Test 02/18/22  1603 01/23/22  0600   AST 7 10   ALT 17 15   ALKPHOS 112 111   BILITOTAL 0.7 0.5     Most Recent 3 INR's:  Recent Labs   Lab Test 01/22/22  1640 12/19/20  2153 03/07/20  1107   INR 1.04 1.00 1.12     Most Recent 3 Creatinines:  Recent Labs   Lab Test 02/18/22  1603 01/24/22 2052 01/24/22  1003   CR 0.72 0.68 0.70     Most Recent 3 Hemoglobins:  Recent Labs   Lab Test 02/18/22  1603 01/24/22  1003 01/23/22  0600   HGB 13.3 11.5* 12.6*     Most Recent Cholesterol Panel:  Recent Labs   Lab Test 08/24/21  1622 09/25/20  0716   CHOL 171 134   LDL  --  75   HDL 36* 31*   TRIG 405* 136     Most Recent TSH and T4:  Recent Labs   Lab Test 09/25/20  0716 05/23/17  1247 04/14/15  0711   TSH 0.87   < > 2.78   T4  --   --  1.02    < > = values in this interval not displayed.     Most Recent Hemoglobin A1c:  Recent Labs   Lab Test 02/18/22  1603   A1C 11.2*     Most Recent 6 glucoses:  Recent Labs   Lab Test 02/19/22  0023 02/18/22  1603 01/25/22  1221 01/25/22  0742 01/25/22  0354 01/25/22  0039   * 357* 271* 274* 332* 375*     Most Recent Urinalysis:  Recent Labs   Lab Test 02/18/22  1820    COLOR Light Yellow   APPEARANCE Clear   URINEGLC >=1000*   URINEBILI Negative   URINEKETONE Negative   SG 1.010   UBLD Negative   URINEPH 6.0   PROTEIN 20 *   NITRITE Negative   LEUKEST Negative   RBCU 0   WBCU 2     10.4    \    13.3    /    458 (H)   N 69    L N/A    131 (L)    100    18 /   ------------------------------------ 212 (H)   ALT 17   AST 7      ALB 3.4   Ca 9.6  3.9    24    0.72 \    % RETIC N/A    LDH N/A  Troponin N/A    BNP N/A    CK N/A  INR N/A   PTT N/A    D-dimer N/A    Fibrinogen N/A    Antithrombin N/A  Ferritin N/A  CRP 4.1    IL-6 N/A  Recent Results (from the past 24 hour(s))   XR Abdomen 2 Views    Narrative    EXAMINATION:  XR ABDOMEN 2VIEWS 2/18/2022 5:53 PM     COMPARISON: Radiograph 1/23/2022 and CT 1/22/2022.    HISTORY: eval sbo    TECHNIQUE: Frontal upright and supine views of the abdomen.    FINDINGS: Retained contrast is present in the ureters and bladder. No  evidence of hydronephrosis. The urinary bladder is significantly  distended. Nonobstructive bowel gas pattern. No abnormally gaseous  distended loops of bowel, definite pneumatosis, or portal venous gas.  Visualized lung bases are clear. Penile prosthesis.       Impression    IMPRESSION: Nonobstructive bowel gas pattern. Significant distention  of the urinary bladder without apparent hydronephrosis.    I have personally reviewed the examination and initial interpretation  and I agree with the findings.    JANIE VANN MD         SYSTEM ID:  [id number]   US Abdomen Limited (RUQ)    Narrative    EXAM: Limited Abdominal Ultrasound, 2/18/2022     HISTORY: Epigastric pain.    COMPARISON: CT 1/22/2022.    FINDINGS:     Liver: 14.2 cm craniocaudally with normal echotexture. No focal mass.  The main portal vein is patent with antegrade flow.    Gallbladder: Echogenic, nonshadowing and nonmobile 5 x 4 mm focus  along the superior wall of the gallbladder. No mobile or shadowing  stones or pericholecystic fluid.  Sonographic Mendes's sign: negative.    Bile Ducts: normal caliber.  CBD diameter: 3 mm.    Pancreas: visualized portions are unremarkable.    Kidney: 9.6 cm in length. No shadowing stones, hydronephrosis, mass or  suspicious cyst.    Ascites: none.    Visualized aorta and IVC are within normal limits.      Impression    IMPRESSION:   5mm gallbladder polyp. Otherwise normal sonographic evaluation of the  right upper quadrant without gallstones or evidence for acute  cholecystitis.    I have personally reviewed the examination and initial interpretation  and I agree with the findings.    JANIE VANN MD         SYSTEM ID:  [id number]

## 2022-02-19 NOTE — ED NOTES
Pt put on call light requesting writer place an NG Tube due to abd pain, pt then complains of nausea. No vomiting since admission. Writer reached out to Admitting provider waiting for response.

## 2022-02-19 NOTE — CONSULTS
[facility] Surgery Consultation    Andrew Lockwood MRN# [id number]   Age: 54 year old YOB: 1967     Date of Admission:  2/18/2022    Date of Consult:   2/18/22    Reason for consult: SBO       Requesting service: Medicine; requesting provider: Dr. Zaire Oropeza                   Assessment and Plan:   Assessment:   54yoM with history of HIV and recurrent SBO presenting with SBO.        Plan:   -NPO  -NG tube  -Gastrograffin challenge after 6 hours of NG decompression.    Discussed with staff, Dr. Vaz            Chief Complaint:   SBO         History of Present Illness:     54yoM with history of HIV and multiple prior small bowel obstructions presenting with two days of abdominal pain and obstipation.  He states that he has ongoing moderate cramping abdominal pain primarily located in the upper abdomen.  This presentation is similar to his prior SBO which was managed nonoperatively one month ago.  He states he last passed flatus or had a bowel movement two days ago.  He denies fevers or chills.  A CT scan was obtained in the ED with multiple loops of dilated small bowel and focal mural thickening concerning for enteritis            Past Medical History:     Past Medical History:   Diagnosis Date     AIDS (H)      Allergic rhinitis due to other allergen     DNS     Anal dysplasia      Chronic abdominal pain      CNS toxoplasmosis (H)      Diabetes type 2, controlled (H)      GERD (gastroesophageal reflux disease)      History of seizure      History of substance abuse (H)      HIV (human immunodeficiency virus infection) (H)      HLD (hyperlipidemia)      Lung nodules      Periungual wart      PTSD (post-traumatic stress disorder)      Sleep apnea     doesn't use CPAP             Past Surgical History:     Past Surgical History:   Procedure Laterality Date     ANOSCOPY N/A 9/9/2020    Procedure: Exam Under Anesthesia, ANOSCOPY, fulgeration of rectal fissures with Rectal Biopsies;  Surgeon:  Thanh Lundberg MD;  Location: [facility] OR     COLONOSCOPY Left 1/22/2016    Procedure: COMBINED COLONOSCOPY, SINGLE OR MULTIPLE BIOPSY/POLYPECTOMY BY BIOPSY;  Surgeon: Clark Saini MD;  Location: [facility] GI     HC EXPLORE UNDESC TESTIS,INGUIN/SCROTAL       LAPAROSCOPIC APPENDECTOMY N/A 1/31/2018    Procedure: LAPAROSCOPIC APPENDECTOMY;  LAPAROSCOPIC APPENDECTOMY;  Surgeon: Dawn Holt MD;  Location: [facility] OR     LAPAROSCOPY DIAGNOSTIC (GENERAL) N/A 7/26/2016    Procedure: LAPAROSCOPY DIAGNOSTIC (GENERAL);  Surgeon: Susannah Arriaga MD;  Location: [facility] OR     LAPAROSCOPY DIAGNOSTIC (GENERAL) N/A 4/16/2018    Procedure: LAPAROSCOPY DIAGNOSTIC (GENERAL);  Diagnostic laparoscopy and lysis of adhesions;  Surgeon: Prince Dowling MD;  Location: [facility] OR     OPTICAL TRACKING SYSTEM CRANIOTOMY, EXCISE TUMOR, COMBINED Left 4/10/2015    Procedure: COMBINED OPTICAL TRACKING SYSTEM CRANIOTOMY, EXCISE TUMOR;  Surgeon: Mirlande Colmenares MD;  Location: [facility] OR     REPAIR GAMEKEEPER'S THUMB Right 12/2/2016    Procedure: REPAIR LIGAMENT ULNAR COLLATERAL THUMB (GAMEKEEPER'S);  Surgeon: Evin Zamorano MD;  Location: [facility] OR     ZZC NONSPECIFIC PROCEDURE      right forearm fracture             Social History:     Social History     Tobacco Use     Smoking status: Current Every Day Smoker     Packs/day: 0.25     Years: 40.00     Pack years: 10.00     Types: Cigarettes     Smokeless tobacco: Former User   Substance Use Topics     Alcohol use: No     Alcohol/week: 0.0 standard drinks     Comment: Last etoh in 2007             Family History:     Family History   Problem Relation Age of Onset     Diabetes Brother      Diabetes Father      Alzheimer Disease Father      Unknown/Adopted Mother      Diabetes Paternal Grandfather      Cancer No family hx of         no skin cancer     Skin Cancer No family hx of         no famiy hx of skin cancer     Glaucoma No family hx of      Macular Degeneration No family hx of        "          Allergies:     Allergies   Allergen Reactions     Metformin      Abdominal pain     Tylenol [Acetaminophen] Itching     Dulaglutide Rash     Insulin Lispro Rash     Patient reported     Penicillin V Other (See Comments) and Rash     Diffuse maculopapular rash + feels \"high\", per pt.              Medications:     Current Facility-Administered Medications   Medication     bictegravir-emtricitabine-tenofovir (BIKTARVY) -25 MG per tablet 1 tablet     glucose gel 15-30 g    Or     dextrose 50 % injection 25-50 mL    Or     glucagon injection 1 mg     insulin aspart (NovoLOG) injection (RAPID ACTING)     insulin detemir (LEVEMIR PEN) injection 10 Units     lactated ringers infusion     melatonin tablet 1 mg     ondansetron (ZOFRAN-ODT) ODT tab 4 mg    Or     ondansetron (ZOFRAN) injection 4 mg     pantoprazole (PROTONIX) IV push injection 40 mg     polyethylene glycol (MIRALAX) Packet 17 g     prochlorperazine (COMPAZINE) injection 10 mg    Or     prochlorperazine (COMPAZINE) tablet 10 mg    Or     prochlorperazine (COMPAZINE) suppository 25 mg     senna-docusate (SENOKOT-S/PERICOLACE) 8.6-50 MG per tablet 1 tablet     Current Outpatient Medications   Medication Sig     Alcohol Swabs PADS Use to swab the area of the injection or todd as directed Per insurance coverage     bictegravir-emtricitabine-tenofovir (BIKTARVY) -25 MG per tablet Take 1 tablet by mouth daily     blood glucose (NO BRAND SPECIFIED) test strip Use to test blood sugar 4 times daily or as directed. To accompany:whatever is covered     blood glucose calibration (NO BRAND SPECIFIED) solution Used to calibrate the blood glucose monitor as needed and as directed.  To accompany  blood glucose brands per insurance coverage     blood glucose monitoring (NO BRAND SPECIFIED) meter device kit Use as directed. Per insurance coverage.     glucose 40 % (400 mg/mL) gel 15 g every 15 minutes by mouth as needed for low blood sugar. Oral gel is " preferable for conscious and able to swallow patient.     insulin detemir (LEVEMIR PEN) 100 UNIT/ML pen Inject 20 Units Subcutaneous 2 times daily     insulin pen needle (BD SCOTT U/F) 32G X 4 MM miscellaneous USE 3 TO 4 NEEDLES DAILY     NOVOLOG FLEXPEN 100 UNIT/ML soln Inject 10 Units Subcutaneous 3 times daily (with meals)     pantoprazole (PROTONIX) 40 MG EC tablet Take 1 tablet (40 mg) by mouth daily     polyethylene glycol (MIRALAX) 17 GM/Dose powder Take 17 g by mouth daily as needed     SENNA-docusate sodium (SENNA S) 8.6-50 MG tablet Take 1 tablet by mouth 2 times daily as needed (constipation)     Sharps Container MISC Use as directed to dispose of needles, lancets and other sharps. Per Insurance coverage     STATIN NOT PRESCRIBED (INTENTIONAL) Please choose reason not prescribed, below     thin (NO BRAND SPECIFIED) lancets Use with lanceting device. To accompany: Test 4 times daily with whatever is covered               Review of Systems:   A complete review of systems was obtained and was negative except as stated above in the HPI            Physical Exam:   All vitals have been reviewed  Temp:  [98.2  F (36.8  C)] 98.2  F (36.8  C)  Pulse:  [81-97] 81  Resp:  [16-18] 18  BP: (121-136)/(85-91) 121/85  SpO2:  [97 %-100 %] 98 %  No intake or output data in the 24 hours ending 02/19/22 0125  Physical Exam:  Gen: NAD  HEENT: Normocephalic, atraumatic  CV: RRR  Abdomen: soft, moderately distended, tympanitic, minimally tender to palpation  Ext: WWP  Skin: No erythema  Neuro: cranial nerves II-XII grossly intact  Psych: Alert, appropriate            Data:   All laboratory data reviewed    Results:  BMP  Recent Labs   Lab 02/19/22  0502 02/19/22  0451 02/19/22  0023 02/18/22  1603   NA  --  136  --  131*   POTASSIUM  --  3.8  --  3.9   CHLORIDE  --  104  --  100   CO2  --  27  --  24   BUN  --  17  --  18   CR  --  0.58*  --  0.72   * 281* 212* 357*     CBC  Recent Labs   Lab 02/19/22  0451 02/18/22  1603    WBC 8.7 10.4   HGB 13.1* 13.3    458*     LFT  Recent Labs   Lab 02/19/22  0451 02/18/22  1603   AST 12 7   ALT 16 17   ALKPHOS 102 112   BILITOTAL 0.6 0.7   ALBUMIN 3.1* 3.4     Recent Labs   Lab 02/19/22  0502 02/19/22  0451 02/19/22  0023 02/18/22  1603   * 281* 212* 357*       Imaging:    CT:   FINDINGS:   LOWER CHEST: Small hiatal hernia.     HEPATOBILIARY: Normal.     PANCREAS: Normal.     SPLEEN: Normal.     ADRENAL GLANDS: Normal.     KIDNEYS/BLADDER: Normal.     BOWEL: Dilated distended loops of small bowel measuring up to 5.2 cm with a short segment area of focal mural thickening. No free air or fluid.     LYMPH NODES: Normal.     VASCULATURE: Unremarkable.     PELVIC ORGANS: Prosthetic penile pump.     MUSCULOSKELETAL: Normal.                                                                      IMPRESSION:   1.  Dilated distended loop of small bowel with short segment area of focal mural thickening and no clearly delineated transition point, query focal enteritis, correlate for infectious, and inflammatory etiologies.  2.  No obstructing renal or ureteral stones. No hydroureteronephrosis.         Lam Luo MD

## 2022-02-19 NOTE — CONSULTS
Care Management Initial Consult    General Information  Assessment completed with: Patient, VM-chart review,    Type of CM/SW Visit: Initial Assessment    Primary Care Provider verified and updated as needed: Yes   Readmission within the last 30 days:        Reason for Consult: discharge planning, housing concerns/homeless  Advance Care Planning:            Communication Assessment  Patient's communication style: spoken language (English or Bilingual)    Hearing Difficulty or Deaf: no   Wear Glasses or Blind: no    Cognitive  Cognitive/Neuro/Behavioral:                        Living Environment:   People in home: other (see comments) (Lives in shelter)     Current living Arrangements: shelter  Name of Facility: [facility]   Able to return to prior arrangements: other (see comments)  Living Arrangement Comments: Pt resides at the [facility]    Family/Social Support:  Care provided by: self  Provides care for: no one                Description of Support System:           Current Resources:   Patient receiving home care services: No     Community Resources:   Equipment currently used at home:    Supplies currently used at home:      Employment/Financial:  Employment Status: disabled        Financial Concerns: No concerns identified   Referral to Financial Worker: No       Lifestyle & Psychosocial Needs:  Social Determinants of Health     Tobacco Use: High Risk     Smoking Tobacco Use: Current Every Day Smoker     Smokeless Tobacco Use: Former User   Alcohol Use: Unknown     Frequency of Alcohol Consumption: Not on file     Average Number of Drinks: Patient refused     Frequency of Binge Drinking: Not on file   Financial Resource Strain: Not on file   Food Insecurity: Not on file   Transportation Needs: Not on file   Physical Activity: Not on file   Stress: Not on file   Social Connections: Not on file   Intimate Partner Violence: Not on file   Depression: Not at risk     PHQ-2 Score: 0    Housing Stability: Not on file       Functional Status:  Prior to admission patient needed assistance:   Dependent ADLs:: Independent  Dependent IADLs:: Independent       Mental Health Status:          Chemical Dependency Status:                Values/Beliefs:  Spiritual, Cultural Beliefs, Scientology Practices, Values that affect care:                 Additional Information:  Writer introduced self, discussed role and went over writer's availability. Pt reports he has gotten into his [facility]. Pt hopes to return there at discharge. Medical work up still pending. TBD if pt will be appropriate to return there at discharge. Pt reports he needs bus tokens at discharge to return.  Social work will continue to follow and provide assistance to ensure a safe and timely discharge.     ________________    RADHA Lehman, [facility]  ED/Observation   [facility]  Phone: [phone]  Pager: [phone]  Fax: [phone]    On-call pager, [phone], 4:00pm to midnight

## 2022-02-20 ENCOUNTER — APPOINTMENT (OUTPATIENT)
Dept: GENERAL RADIOLOGY | Facility: CLINIC | Age: 57
End: 2022-02-20
Payer: COMMERCIAL

## 2022-02-20 LAB
ALBUMIN SERPL-MCNC: 2.8 G/DL (ref 3.4–5)
ALP SERPL-CCNC: 104 U/L (ref 40–150)
ALT SERPL W P-5'-P-CCNC: 18 U/L (ref 0–70)
ANION GAP SERPL CALCULATED.3IONS-SCNC: 4 MMOL/L (ref 3–14)
AST SERPL W P-5'-P-CCNC: 13 U/L (ref 0–45)
BILIRUB SERPL-MCNC: 0.6 MG/DL (ref 0.2–1.3)
BUN SERPL-MCNC: 17 MG/DL (ref 7–30)
CALCIUM SERPL-MCNC: 8.9 MG/DL (ref 8.5–10.1)
CHLORIDE BLD-SCNC: 107 MMOL/L (ref 94–109)
CO2 SERPL-SCNC: 28 MMOL/L (ref 20–32)
CREAT SERPL-MCNC: 0.74 MG/DL (ref 0.66–1.25)
ERYTHROCYTE [DISTWIDTH] IN BLOOD BY AUTOMATED COUNT: 14.6 % (ref 10–15)
GFR SERPL CREATININE-BSD FRML MDRD: >90 ML/MIN/1.73M2
GLUCOSE BLD-MCNC: 175 MG/DL (ref 70–99)
GLUCOSE BLDC GLUCOMTR-MCNC: 131 MG/DL (ref 70–99)
GLUCOSE BLDC GLUCOMTR-MCNC: 136 MG/DL (ref 70–99)
GLUCOSE BLDC GLUCOMTR-MCNC: 268 MG/DL (ref 70–99)
GLUCOSE BLDC GLUCOMTR-MCNC: 326 MG/DL (ref 70–99)
HCT VFR BLD AUTO: 35.8 % (ref 40–53)
HGB BLD-MCNC: 11.7 G/DL (ref 13.3–17.7)
MAGNESIUM SERPL-MCNC: 1.8 MG/DL (ref 1.8–2.6)
MCH RBC QN AUTO: 28.3 PG (ref 26.5–33)
MCHC RBC AUTO-ENTMCNC: 32.7 G/DL (ref 31.5–36.5)
MCV RBC AUTO: 87 FL (ref 78–100)
PLATELET # BLD AUTO: 333 10E3/UL (ref 150–450)
POTASSIUM BLD-SCNC: 3.7 MMOL/L (ref 3.4–5.3)
PROT SERPL-MCNC: 6.7 G/DL (ref 6.8–8.8)
RBC # BLD AUTO: 4.14 10E6/UL (ref 4.4–5.9)
SODIUM SERPL-SCNC: 139 MMOL/L (ref 133–144)
WBC # BLD AUTO: 7.3 10E3/UL (ref 4–11)

## 2022-02-20 PROCEDURE — 99225 PR SUBSEQUENT OBSERVATION CARE,LEVEL II: CPT | Mod: GC | Performed by: STUDENT IN AN ORGANIZED HEALTH CARE EDUCATION/TRAINING PROGRAM

## 2022-02-20 PROCEDURE — G0378 HOSPITAL OBSERVATION PER HR: HCPCS

## 2022-02-20 PROCEDURE — 99207 PR CDG-MDM COMPONENT: MEETS MODERATE - DOWN CODED: CPT | Performed by: STUDENT IN AN ORGANIZED HEALTH CARE EDUCATION/TRAINING PROGRAM

## 2022-02-20 PROCEDURE — 82962 GLUCOSE BLOOD TEST: CPT

## 2022-02-20 PROCEDURE — 96372 THER/PROPH/DIAG INJ SC/IM: CPT

## 2022-02-20 PROCEDURE — C9113 INJ PANTOPRAZOLE SODIUM, VIA: HCPCS | Performed by: NURSE PRACTITIONER

## 2022-02-20 PROCEDURE — 250N000011 HC RX IP 250 OP 636: Performed by: NURSE PRACTITIONER

## 2022-02-20 PROCEDURE — 74018 RADEX ABDOMEN 1 VIEW: CPT

## 2022-02-20 PROCEDURE — 80053 COMPREHEN METABOLIC PANEL: CPT

## 2022-02-20 PROCEDURE — 250N000013 HC RX MED GY IP 250 OP 250 PS 637

## 2022-02-20 PROCEDURE — 99207 PR NO BILLABLE SERVICE THIS VISIT: CPT | Performed by: NURSE PRACTITIONER

## 2022-02-20 PROCEDURE — 250N000013 HC RX MED GY IP 250 OP 250 PS 637: Performed by: NURSE PRACTITIONER

## 2022-02-20 PROCEDURE — 74018 RADEX ABDOMEN 1 VIEW: CPT | Mod: 26 | Performed by: STUDENT IN AN ORGANIZED HEALTH CARE EDUCATION/TRAINING PROGRAM

## 2022-02-20 PROCEDURE — 250N000012 HC RX MED GY IP 250 OP 636 PS 637

## 2022-02-20 PROCEDURE — 250N000011 HC RX IP 250 OP 636

## 2022-02-20 PROCEDURE — 96372 THER/PROPH/DIAG INJ SC/IM: CPT | Performed by: NURSE PRACTITIONER

## 2022-02-20 PROCEDURE — 83735 ASSAY OF MAGNESIUM: CPT

## 2022-02-20 PROCEDURE — 36415 COLL VENOUS BLD VENIPUNCTURE: CPT

## 2022-02-20 PROCEDURE — 250N000012 HC RX MED GY IP 250 OP 636 PS 637: Performed by: NURSE PRACTITIONER

## 2022-02-20 PROCEDURE — 96376 TX/PRO/DX INJ SAME DRUG ADON: CPT

## 2022-02-20 PROCEDURE — 85027 COMPLETE CBC AUTOMATED: CPT

## 2022-02-20 RX ORDER — IBUPROFEN 200 MG
400 TABLET ORAL EVERY 6 HOURS PRN
Status: DISCONTINUED | OUTPATIENT
Start: 2022-02-20 | End: 2022-02-20

## 2022-02-20 RX ORDER — POLYETHYLENE GLYCOL 3350 17 G/17G
17 POWDER, FOR SOLUTION ORAL DAILY
Status: DISCONTINUED | OUTPATIENT
Start: 2022-02-20 | End: 2022-02-20

## 2022-02-20 RX ORDER — SENNOSIDES 8.6 MG
8.6 TABLET ORAL 2 TIMES DAILY PRN
Status: DISCONTINUED | OUTPATIENT
Start: 2022-02-20 | End: 2022-02-21 | Stop reason: HOSPADM

## 2022-02-20 RX ORDER — POTASSIUM CHLORIDE 750 MG/1
40 TABLET, EXTENDED RELEASE ORAL ONCE
Status: COMPLETED | OUTPATIENT
Start: 2022-02-20 | End: 2022-02-20

## 2022-02-20 RX ORDER — IBUPROFEN 200 MG
400 TABLET ORAL EVERY 4 HOURS PRN
Status: DISCONTINUED | OUTPATIENT
Start: 2022-02-20 | End: 2022-02-21 | Stop reason: HOSPADM

## 2022-02-20 RX ADMIN — HEPARIN SODIUM 5000 UNITS: 5000 INJECTION, SOLUTION INTRAVENOUS; SUBCUTANEOUS at 20:22

## 2022-02-20 RX ADMIN — PANTOPRAZOLE SODIUM 40 MG: 40 INJECTION, POWDER, FOR SOLUTION INTRAVENOUS at 08:15

## 2022-02-20 RX ADMIN — IBUPROFEN 400 MG: 200 TABLET, FILM COATED ORAL at 17:45

## 2022-02-20 RX ADMIN — INSULIN ASPART 4 UNITS: 100 INJECTION, SOLUTION INTRAVENOUS; SUBCUTANEOUS at 21:46

## 2022-02-20 RX ADMIN — HYDROMORPHONE HYDROCHLORIDE 0.5 MG: 1 INJECTION, SOLUTION INTRAMUSCULAR; INTRAVENOUS; SUBCUTANEOUS at 08:27

## 2022-02-20 RX ADMIN — INSULIN DETEMIR 20 UNITS: 100 INJECTION, SOLUTION SUBCUTANEOUS at 21:46

## 2022-02-20 RX ADMIN — POTASSIUM CHLORIDE 40 MEQ: 750 TABLET, EXTENDED RELEASE ORAL at 15:13

## 2022-02-20 RX ADMIN — HEPARIN SODIUM 5000 UNITS: 5000 INJECTION, SOLUTION INTRAVENOUS; SUBCUTANEOUS at 08:15

## 2022-02-20 RX ADMIN — PANTOPRAZOLE SODIUM 40 MG: 40 INJECTION, POWDER, FOR SOLUTION INTRAVENOUS at 20:22

## 2022-02-20 RX ADMIN — INSULIN DETEMIR 10 UNITS: 100 INJECTION, SOLUTION SUBCUTANEOUS at 08:16

## 2022-02-20 RX ADMIN — INSULIN ASPART 1 UNITS: 100 INJECTION, SOLUTION INTRAVENOUS; SUBCUTANEOUS at 08:16

## 2022-02-20 RX ADMIN — INSULIN ASPART 3 UNITS: 100 INJECTION, SOLUTION INTRAVENOUS; SUBCUTANEOUS at 17:52

## 2022-02-20 RX ADMIN — IBUPROFEN 400 MG: 200 TABLET, FILM COATED ORAL at 21:46

## 2022-02-20 RX ADMIN — BICTEGRAVIR SODIUM, EMTRICITABINE, AND TENOFOVIR ALAFENAMIDE FUMARATE 1 TABLET: 50; 200; 25 TABLET ORAL at 08:16

## 2022-02-20 RX ADMIN — ONDANSETRON 4 MG: 4 TABLET, ORALLY DISINTEGRATING ORAL at 13:46

## 2022-02-20 RX ADMIN — IBUPROFEN 400 MG: 200 TABLET, FILM COATED ORAL at 13:05

## 2022-02-20 NOTE — CONSULTS
"Brief diabetes curbside note:    Chief Complaint:  \"non-urgent consult can be seen tomorrow, pt admitted with SBO, hgb A1c 11, HIV+, homeless, assist w inpt DM2 mgmt and outpt plan\"    History of Present Illness:  Andrew Lockwood is a 54 year old male admitted on 2/18/2022. He has a PMH of HIV/AIDS on HAART (undetectable viral load), CNS toxoplasmosis, DM2, s/p appendectomy, recurrent SBO (since 2016), s/p diagnostic laparoscopy x 2 with ANASTASIIA  (4/2018) who presented to the [facility] for upper abdominal pain.     Briefly, patient with long-standing hx of uncontrolled T2DM hx with poor compliance which is multifactorial.  Team looking today for recommendations on cost effective options or alternatives to his current regimen of BID Determir.    There is no ability to run test claims on the weekend, would need to wait until Monday for any financial data for patient.   Team shares he has a follow up outpatient Endocrinology clinic appointment with     Brief review of previous consult with IDS reveals he has not previously tolerated oral or non-insulin agents and has NOT tolerated glargine in past 2/2 complaints of abd pain, does well with detemir.     He has used both fixed meal doses as well as sliding scale insulin in the past  Has skipped meals 2/2 to food insecurity as well as insulin insecurity  Long hx of homelessness    Recent consult from 9/2021 reveals:    Diabetes Type: 2  Diabetes Duration: 7 years  Usual Diabetes Regimen:   Intermittent BG monitoring frequency  Irregular diet r/t food insecurity  Levemir 35 units twice per day, but not taking it routinely.  Novolog high resistance correction  No prandial Novolog  Quite active-- walking much of his days since homeless  (has tried a variety of oral and non-insulin injectables, but did not tolerate them)    Diabetes Complications: denies retinopathy, denies peripheral neuropathy, denies nephropathy  Able to Detect Hypoglycemia: yes, has a hard " time having ready access to carbohydrates to raise low BG (insurance doesn't cover glucose tabs)  Usual Diabetes Care Provider: [facility]  Factors Impacting Glucose Control: lack of stable housing is main factor     Currently while NPO, patient on 10 unit(s) of detemir BID with BGs -> 168->179->136  Appears there has been a ROBF and PO advanced with plan to discharge shortly    Would not change plan from detemir choice to any other agents from briefly what I have found in chart review  Would continue with plan for Mr. Lockwood to follow up with [facility] Endocrine clinic with Dr. Lynne on 3/31/2022    Rec while NPO or decreased PO:  Detemir 10 unit(s)  BID   When PO improves -> increase detemir to at least 20 unit(s) BID  If he has access to novolog - has used fixed dosing in past - 5 unit(s) with each meal is a safe/conservative dose for a full meal -(has used up to 10 unit(s) per meal per chart review in past)    sliding scale insulin (again if he has access to novolog)     For Pre-Meal  - 164 give 1 unit.   For Pre-Meal  - 189 give 2 units.   For Pre-Meal  - 214 give 3 units.   For Pre-Meal  - 239 give 4 units.   For Pre-Meal  - 264 give 5 units.   For Pre-Meal  - 289 give 6 units.   For Pre-Meal  - 314 give 7 units.   For Pre-Meal  - 339 give 8 units.   For Pre-Meal BG = or > 340 give 9 units.    BEDTIME-  For  - 224 give 1 units.   For  - 249 give 2 units.   For  - 274 give 3 units.   For  - 299 give 4 units.   For  - 324 give 5 units.   For  - 349 give 6 units.   For BG = or > 350 give 7 units.      VIKASH Jessica CNP   Inpatient Diabetes Management Service  Pager - [phone]  Friday - Monday 0800 - 1600 hrs    To contact Endocrine Diabetes service:   From 8AM-4PM: page inpatient diabetes provider that is following the patient that day (see filed or incomplete progress notes/consult  notes).  If uncertain of provider assignment: page job code [phone].  For questions or updates from 4PM-8AM: page the diabetes job code for on call fellow: [phone]    Please notify inpatient diabetes service if changes are planned to steroids, nutrition, or if procedures are planned requiring prolonged NPO status.Diabetes Management Team job code: [phone]

## 2022-02-20 NOTE — ED NOTES
Patient continues to complain of abdominal pain- he states it is a cramping pain. Patient has had several BMs. He has also been asking to eat through out the night, he is currently NPO. No major events/changes over night.

## 2022-02-20 NOTE — PLAN OF CARE
NG removed this a.m. Pt tolerated fulls. Pt nauseated after regular diet. Prn zofran for nausea. Prn ibuprofen for abdominal discomfort. Awaiting inpatient bed.

## 2022-02-20 NOTE — PROGRESS NOTES
[facility]    Progress Note - Medicine Service, MAROON TEAM 2       Date of Admission:  2/18/2022    Assessment & Plan        Andrew Lockwood is a 54 year old male with a past medical history of HIV on HAART (undectable viral load), history of CNS toxoplasmosis, T2DM admitted for recurrent SBO management and decompression.     Changes Today:   - Patient had ROBM overnight (2 episodes) and requesting to advance diet this morning   - NGT pulled, started on clear diet this AM, full diet at lunch time   - Will need ride to leave hospital safely, SW consult placed   - Continuing to monitor abdominal pain while advancing diet    # Recurrent SBO  This patient has a history of reoccuring SBO's that have resolved nonoperatively over the last few hospitalizations (2/5/22-2/8/22, 1/22/22-1/25/22). He has had two exploratory laparotomies in the past (2016,2018) with no source of obstruction noted on either occasion. CT on 10/5/18 showed focal small bowel enteritis with associated wall thickening and narrowing small bowel dilation proximal to the inflammation. He has had chronic constipation since childhood. He has had negative workup for Crohn's disease including negative EGD and colonoscopy. The most recent CT scan shows nonobstructive bowel gas pattern, significant distention of the urinary bladder without apparent hydronephrosis. Abdominal US reports 5mm gallbladder polyp otherwise normal. Reviewing the EMR, surgery has been consulted in the past and recommended diagnostic laparoscopy w/ ANASTASIIA but patient declined and was able to improve clinically and discharge without surgical intervention. His past admissions with SBO have resolved with conservative management and other times have required NGT. Followed by colorectal surgery and GI with multidisciplinary work-up for recurrent SBO of unknown etiology and lost to follow up. He states he last passed flatus or had a bowel movement  two days ago.  He denies fevers or chills.    - NGT placed on 2/19/21   - Placed on suction and s/p gastrografin with abdominal XR showing resolution of SBO and thus NGT was pulled 2/20 AM.   - Surgery signing off after ROBF   - IVF LR stopped   - Zofran and compazine PRN's   - Protonix IV 40 mg BID   - IV dilaudid 0.5 mg q6h PRN (try to avoid with bowel obstruction)   - Follow-up with GI outpatient to be scheduled prior to discharge    # T2DM  A1c = 11.2% on 2/18/22. Was supposed to be discharge on levemir 20 units BID, but was sent on 10 units BID. With this A1c, good chance patient has been noncompliant with the current regimen.   - Continue 10 units BID while NPO   - Medium sliding scale insulin   - Hypoglycemia protocol ordered   - Spoke with inpatient diabetes team; transition to 20 units levemir BID once tolerating regular diet with follow-up appointment with endocrinology in one month    # HIV  # History of CNS Toxoplasmosis  HIV not detectable and most recent CD4 count 519 on 8/24/21   - Continue PTA bictarvy    # Financial Insecurity  Patient is currently living in an [facility] with financial insecurity.    - SW consult placed for discharge planning     Diet: Regular Diet Adult    DVT Prophylaxis: subcutaneous heparin  Potts Catheter: Not present  Fluids: LR at 125 ml/hr  Central Lines: None  Cardiac Monitoring: None  Code Status: Full Code      Disposition Plan   Expected Discharge: Today or tomorrow  Anticipated discharge location:  Awaiting care coordination huddle     The patient's care was discussed with the attending physician, Dr. Parish Lopez.    Nico Mason MD  Medicine Service, [address]  Securely message with the Vocera Web Console (learn more here)  Text page via [employer] Paging/Directory   Please see signed in provider for up to date coverage  information  ______________________________________________________________________    Interval History   NAEON. Patient had ROBM. NGT pulled this morning and tolerating advancement of diet to regular diet for lunch. No other concerns per patient.     Data reviewed today: I reviewed all medications, new labs and imaging results over the last 24 hours.    Physical Exam   Vital Signs: Temp: 98.9  F (37.2  C) Temp src: Oral BP: 122/88 Pulse: 87   Resp: 16 SpO2: 91 % O2 Device: None (Room air)    Weight: 155 lbs 0 oz  Constitutional: Alert, cooperative, slight distress, and appears dishelveled  Eyes: Lids and lashes normal, pupils equal, round and reactive to light, extra ocular muscles intact, sclera clear, conjunctiva normal  ENT: Normocephalic, without obvious abnormality, atraumatic  Hematologic / Lymphatic: no cervical lymphadenopathy  Respiratory: No increased work of breathing, good air exchange, clear to auscultation bilaterally, no crackles or wheezing  Cardiovascular: Normal apical impulse, regular rate and rhythm, normal S1 and S2, no S3 or S4, and no murmur noted  GI: No scars, hyperactive bowel sounds, distended, tender especially in the epigastrum, no masses palpated  Skin: no bruising or bleeding, normal skin color, texture, turgor, no redness, warmth, or swelling  Musculoskeletal: There is no redness, warmth, or swelling of the joints. Motor strength is 5 out of 5 all extremities bilaterally.  Tone is normal.  Neurologic: Awake, alert, oriented to name, place and time.  Cranial nerves II-XII are grossly intact.  Neuropsychiatric: General: normal, calm and normal eye contact    Data   Recent Labs   Lab 02/20/22  0644 02/20/22  0435 02/19/22  2352 02/19/22  0502 02/19/22  0451 02/19/22  0023 02/18/22  1603   WBC 7.3  --   --   --  8.7  --  10.4   HGB 11.7*  --   --   --  13.1*  --  13.3   MCV 87  --   --   --  87  --  82     --   --   --  334  --  458*     --   --   --  136  --  131*    POTASSIUM 3.7  --   --   --  3.8  --  3.9   CHLORIDE 107  --   --   --  104  --  100   CO2 28  --   --   --  27  --  24   BUN 17  --   --   --  17  --  18   CR 0.74  --   --   --  0.58*  --  0.72   ANIONGAP 4  --   --   --  5  --  7   LINDA 8.9  --   --   --  9.2  --  9.6   * 136* 179*   < > 281*   < > 357*   ALBUMIN 2.8*  --   --   --  3.1*  --  3.4   PROTTOTAL 6.7*  --   --   --  7.4  --  8.3   BILITOTAL 0.6  --   --   --  0.6  --  0.7   ALKPHOS 104  --   --   --  102  --  112   ALT 18  --   --   --  16  --  17   AST 13  --   --   --  12  --  7   LIPASE  --   --   --   --   --   --  191    < > = values in this interval not displayed.     Recent Results (from the past 24 hour(s))   XR Gastrografin Challenge    Narrative    XR Gastrografin challenge on 2/20/2022 4:06 AM.    INDICATION: Suspected bowel obstruction.    COMPARISON: Radiograph and CT dated 2/19/2022    FINDINGS:   Portable AP supine radiograph of the abdomen. Gastric tube projects  over the stomach. Contrast is seen throughout the colon and rectum. No  abnormally dilated loops of small bowel. Resolved air distention of  the stomach. No pneumatosis or portal venous gas.      Impression    IMPRESSION:     1. No evidence for small bowel obstruction, with contrast opacifying  the large bowel and rectum.  2. Decreased air distention of the stomach.    I have personally reviewed the examination and initial interpretation  and I agree with the findings.    LAURYN FOURNIER MD         SYSTEM ID:  [id number]

## 2022-02-20 NOTE — PROGRESS NOTES
Pt ordered jello for dinner, ate without getting blood sugar checked. Blood sugar checked within 10 mins of eating jello, BG was 268, given insulin. Tolerating jello and esau crackers.

## 2022-02-20 NOTE — PROGRESS NOTES
Emergency General Surgery Progress Note  2/20/2022    24 hour events/Subjective:   - Gastrograffin with contrast in colon    - Multiple BMs   - No nausea    O:  Temp:  [98.1  F (36.7  C)-98.9  F (37.2  C)] 98.9  F (37.2  C)  Pulse:  [82-98] 82  Resp:  [16-18] 18  BP: ()/(59-81) 96/66  SpO2:  [93 %-97 %] 95 %  I/O last 3 completed shifts:  In: 40 [NG/GT:40]  Out: -     Gen: Alert & conversant  Resp: Breathing non-labored  CV: Regular rate.  Abd: Soft, minimally distended, non-tender.  Ext: Warm and well perfused    BMPRecent Labs   Lab 02/20/22  0644 02/20/22  0435 02/19/22  2352 02/19/22 2004 02/19/22  0502 02/19/22  0451 02/19/22  0023 02/18/22  1603     --   --   --   --  136  --  131*   POTASSIUM 3.7  --   --   --   --  3.8  --  3.9   CHLORIDE 107  --   --   --   --  104  --  100   LINDA 8.9  --   --   --   --  9.2  --  9.6   CO2 28  --   --   --   --  27  --  24   BUN 17  --   --   --   --  17  --  18   CR 0.74  --   --   --   --  0.58*  --  0.72   * 136* 179* 168*   < > 281*   < > 357*    < > = values in this interval not displayed.     CBC  Recent Labs   Lab 02/20/22  0644 02/19/22  0451 02/18/22  1603   WBC 7.3 8.7 10.4   RBC 4.14* 4.70 4.77   HGB 11.7* 13.1* 13.3   HCT 35.8* 40.8 39.1*   MCV 87 87 82   MCH 28.3 27.9 27.9   MCHC 32.7 32.1 34.0   RDW 14.6 14.2 14.0    334 458*     INRNo lab results found in last 7 days.       A/P: Andrew Lockwood is a 54 year old male presenting with recurrent SBO, now with ROBF   - D/C NG    - Clears, advance as tolerated   - Surgery to sign off   - Recommend outpatient GI referral for evaluation of functional/motility disorders    Discussed with Dr. Vaz  --    Lam Luo MD  8:09 AM, 2/20/2022

## 2022-02-21 ENCOUNTER — PATIENT OUTREACH (OUTPATIENT)
Dept: GASTROENTEROLOGY | Facility: CLINIC | Age: 57
End: 2022-02-21
Payer: COMMERCIAL

## 2022-02-21 VITALS
TEMPERATURE: 97.1 F | RESPIRATION RATE: 18 BRPM | OXYGEN SATURATION: 97 % | BODY MASS INDEX: 26.61 KG/M2 | HEART RATE: 82 BPM | WEIGHT: 155 LBS | SYSTOLIC BLOOD PRESSURE: 116 MMHG | DIASTOLIC BLOOD PRESSURE: 79 MMHG

## 2022-02-21 LAB
ANION GAP SERPL CALCULATED.3IONS-SCNC: 6 MMOL/L (ref 3–14)
BUN SERPL-MCNC: 14 MG/DL (ref 7–30)
CALCIUM SERPL-MCNC: 8.8 MG/DL (ref 8.5–10.1)
CHLORIDE BLD-SCNC: 106 MMOL/L (ref 94–109)
CO2 SERPL-SCNC: 26 MMOL/L (ref 20–32)
CREAT SERPL-MCNC: 0.75 MG/DL (ref 0.66–1.25)
ERYTHROCYTE [DISTWIDTH] IN BLOOD BY AUTOMATED COUNT: 14.2 % (ref 10–15)
GFR SERPL CREATININE-BSD FRML MDRD: >90 ML/MIN/1.73M2
GLUCOSE BLD-MCNC: 296 MG/DL (ref 70–99)
GLUCOSE BLDC GLUCOMTR-MCNC: 287 MG/DL (ref 70–99)
GLUCOSE BLDC GLUCOMTR-MCNC: 318 MG/DL (ref 70–99)
GLUCOSE BLDC GLUCOMTR-MCNC: 339 MG/DL (ref 70–99)
HCT VFR BLD AUTO: 34.3 % (ref 40–53)
HGB BLD-MCNC: 11.2 G/DL (ref 13.3–17.7)
MCH RBC QN AUTO: 27.9 PG (ref 26.5–33)
MCHC RBC AUTO-ENTMCNC: 32.7 G/DL (ref 31.5–36.5)
MCV RBC AUTO: 85 FL (ref 78–100)
PLATELET # BLD AUTO: 342 10E3/UL (ref 150–450)
POTASSIUM BLD-SCNC: 3.8 MMOL/L (ref 3.4–5.3)
RBC # BLD AUTO: 4.02 10E6/UL (ref 4.4–5.9)
SODIUM SERPL-SCNC: 138 MMOL/L (ref 133–144)
WBC # BLD AUTO: 4.8 10E3/UL (ref 4–11)

## 2022-02-21 PROCEDURE — 85027 COMPLETE CBC AUTOMATED: CPT

## 2022-02-21 PROCEDURE — 250N000013 HC RX MED GY IP 250 OP 250 PS 637: Performed by: NURSE PRACTITIONER

## 2022-02-21 PROCEDURE — 96372 THER/PROPH/DIAG INJ SC/IM: CPT

## 2022-02-21 PROCEDURE — 80048 BASIC METABOLIC PNL TOTAL CA: CPT

## 2022-02-21 PROCEDURE — C9113 INJ PANTOPRAZOLE SODIUM, VIA: HCPCS | Performed by: NURSE PRACTITIONER

## 2022-02-21 PROCEDURE — 96376 TX/PRO/DX INJ SAME DRUG ADON: CPT

## 2022-02-21 PROCEDURE — G0378 HOSPITAL OBSERVATION PER HR: HCPCS

## 2022-02-21 PROCEDURE — 250N000013 HC RX MED GY IP 250 OP 250 PS 637

## 2022-02-21 PROCEDURE — 250N000011 HC RX IP 250 OP 636

## 2022-02-21 PROCEDURE — 250N000011 HC RX IP 250 OP 636: Performed by: NURSE PRACTITIONER

## 2022-02-21 PROCEDURE — 36415 COLL VENOUS BLD VENIPUNCTURE: CPT

## 2022-02-21 PROCEDURE — 82962 GLUCOSE BLOOD TEST: CPT | Mod: 91

## 2022-02-21 PROCEDURE — 99217 PR OBSERVATION CARE DISCHARGE: CPT | Mod: GC | Performed by: STUDENT IN AN ORGANIZED HEALTH CARE EDUCATION/TRAINING PROGRAM

## 2022-02-21 RX ORDER — INSULIN ASPART 100 [IU]/ML
10 INJECTION, SOLUTION INTRAVENOUS; SUBCUTANEOUS
Qty: 75 ML | Refills: 0 | Status: SHIPPED | OUTPATIENT
Start: 2022-02-21 | End: 2022-02-21

## 2022-02-21 RX ORDER — ONDANSETRON 4 MG/1
4 TABLET, ORALLY DISINTEGRATING ORAL EVERY 6 HOURS PRN
Qty: 15 TABLET | Refills: 0 | Status: SHIPPED | OUTPATIENT
Start: 2022-02-21 | End: 2022-03-23

## 2022-02-21 RX ORDER — SENNA AND DOCUSATE SODIUM 50; 8.6 MG/1; MG/1
1 TABLET, FILM COATED ORAL 2 TIMES DAILY PRN
Qty: 30 TABLET | Refills: 0 | Status: SHIPPED | OUTPATIENT
Start: 2022-02-21 | End: 2022-05-22

## 2022-02-21 RX ORDER — INSULIN ASPART 100 [IU]/ML
5 INJECTION, SOLUTION INTRAVENOUS; SUBCUTANEOUS
Qty: 75 ML | Refills: 0 | Status: SHIPPED | OUTPATIENT
Start: 2022-02-21 | End: 2022-05-29

## 2022-02-21 RX ORDER — POLYETHYLENE GLYCOL 3350 17 G/17G
17 POWDER, FOR SOLUTION ORAL 2 TIMES DAILY
Qty: 3060 G | Refills: 0 | Status: ON HOLD | OUTPATIENT
Start: 2022-02-21 | End: 2022-03-16

## 2022-02-21 RX ORDER — IBUPROFEN 400 MG/1
400 TABLET, FILM COATED ORAL EVERY 4 HOURS PRN
Qty: 60 TABLET | Refills: 0 | Status: SHIPPED | OUTPATIENT
Start: 2022-02-21 | End: 2022-03-23

## 2022-02-21 RX ADMIN — SENNOSIDES AND DOCUSATE SODIUM 1 TABLET: 50; 8.6 TABLET ORAL at 13:14

## 2022-02-21 RX ADMIN — BICTEGRAVIR SODIUM, EMTRICITABINE, AND TENOFOVIR ALAFENAMIDE FUMARATE 1 TABLET: 50; 200; 25 TABLET ORAL at 08:39

## 2022-02-21 RX ADMIN — POLYETHYLENE GLYCOL 3350 17 G: 17 POWDER, FOR SOLUTION ORAL at 13:14

## 2022-02-21 RX ADMIN — IBUPROFEN 400 MG: 200 TABLET, FILM COATED ORAL at 04:25

## 2022-02-21 RX ADMIN — INSULIN DETEMIR 20 UNITS: 100 INJECTION, SOLUTION SUBCUTANEOUS at 08:45

## 2022-02-21 RX ADMIN — HEPARIN SODIUM 5000 UNITS: 5000 INJECTION, SOLUTION INTRAVENOUS; SUBCUTANEOUS at 08:44

## 2022-02-21 RX ADMIN — INSULIN ASPART 4 UNITS: 100 INJECTION, SOLUTION INTRAVENOUS; SUBCUTANEOUS at 11:48

## 2022-02-21 RX ADMIN — INSULIN ASPART 4 UNITS: 100 INJECTION, SOLUTION INTRAVENOUS; SUBCUTANEOUS at 08:45

## 2022-02-21 RX ADMIN — PANTOPRAZOLE SODIUM 40 MG: 40 INJECTION, POWDER, FOR SOLUTION INTRAVENOUS at 08:44

## 2022-02-21 RX ADMIN — INSULIN ASPART 4 UNITS: 100 INJECTION, SOLUTION INTRAVENOUS; SUBCUTANEOUS at 00:12

## 2022-02-21 RX ADMIN — INSULIN ASPART 3 UNITS: 100 INJECTION, SOLUTION INTRAVENOUS; SUBCUTANEOUS at 04:25

## 2022-02-21 RX ADMIN — IBUPROFEN 400 MG: 200 TABLET, FILM COATED ORAL at 08:40

## 2022-02-21 RX ADMIN — IBUPROFEN 400 MG: 200 TABLET, FILM COATED ORAL at 13:14

## 2022-02-21 NOTE — PROGRESS NOTES
Left message for Andrew regarding an appointment.    Contact information provided.     Previously attempted to reach this patient without success.   Referral has also been placed for a upper and lower endoscopy.

## 2022-02-21 NOTE — PLAN OF CARE
Goal Outcome Evaluation:    Pt discharge paperwork printed, reviewed, and signed by pt and RN. Questions answered. Medications ready for  at discharge pharmacy. IV removed. Social work to give pt bus coins to take bus home.

## 2022-02-21 NOTE — PROGRESS NOTES
Observation Note    - Diagnostic tests and consults completed and resulted - Yes  - Vital signs normal or at patient baseline - Yes  - Tolerating oral intake to maintain hydration  - Yes  - Adequate pain control on oral analgesics - Yes  - Returns to baseline functional status - Yes   - Safe disposition plan has been identified - No     Will continue to monitor status of each goal every 4 hours until they are met.

## 2022-02-21 NOTE — PLAN OF CARE
From ED at 1900. A&Ox4, VSS on RA. Pain managed with PRN Ibuprofen. No nausea reported. Tolerating regular diet. BG Q4H, last . Passing gas, no BM. Voiding spontaneously. Up ad lexi.

## 2022-02-21 NOTE — PLAN OF CARE
Admitted/transferred from: ED  2 RN full   skin assessment completed by Felisa Valenzuela, RN and Harleen JOHNSON RN.  Skin assessment finding: issues found : old scars scattered on body   Interventions/actions: other : none     Will continue to monitor.

## 2022-02-21 NOTE — PLAN OF CARE
Plan of Care Note    Reason for Admission: Abdominal pain, epigastric  Procedures: None  IV Access/Incisions/Drains/Wounds:   Peripheral IV 02/18/22 Anterior;Left Upper forearm (Active)   Site Assessment WDL 02/21/22 0000   Line Status Saline locked 02/21/22 0000   Phlebitis Scale 0-->no symptoms 02/21/22 0000   Infiltration Scale 0 02/21/22 0000   Number of days: 3   IVF: None  VS: /62 (BP Location: Right arm)   Pulse 81   Temp 98.5  F (36.9  C) (Oral)   Resp 16   Wt 70.3 kg (155 lb)   SpO2 97%   BMI 26.61 kg/m    Diet: Regular Diet Adult    Activity: UAL  Pain Management: Ibuprofen  GI/: Voiding spontaneously, +BM, +Flatus, -Nausea  Neuro: A&Ox4; Irritable/Inpatient at times  Team: Kristopher Evans 2  Pertinent Labs: None      Shift Summary  Patient slept well on shift. Patient is lethargic and will often drift off when you are talking with him. Patient will rate his pain at an 8 but appears comfortable. Patient likes his finger checks to be done on the pads of his finger. Patient is Obs with Observation notes Q4. Plan for patient to discharge once adequate disposition planned is established per team note. Continue POC.

## 2022-02-21 NOTE — PROGRESS NOTES
Observation Note     - Diagnostic tests and consults completed and resulted - Yes  - Vital signs normal or at patient baseline - Yes  - Tolerating oral intake to maintain hydration  - Yes  - Adequate pain control on oral analgesics - Met  - Returns to baseline functional status - Yes   - Safe disposition plan has been identified - Met      Will continue to monitor status of each goal every 4 hours until they are met.

## 2022-02-21 NOTE — PROGRESS NOTES
Observation Note     - Diagnostic tests and consults completed and resulted - Yes  - Vital signs normal or at patient baseline - Yes  - Tolerating oral intake to maintain hydration  - Yes  - Adequate pain control on oral analgesics - Partially met  - Returns to baseline functional status - Yes   - Safe disposition plan has been identified - Partially met      Will continue to monitor status of each goal every 4 hours until they are met.

## 2022-02-22 ENCOUNTER — PATIENT OUTREACH (OUTPATIENT)
Dept: CARE COORDINATION | Facility: CLINIC | Age: 57
End: 2022-02-22
Payer: COMMERCIAL

## 2022-02-22 DIAGNOSIS — Z71.89 OTHER SPECIFIED COUNSELING: ICD-10-CM

## 2022-02-22 NOTE — PROGRESS NOTES
Clinic Care Coordination Contact  [facility]/Voicemail       Clinical Data: Care Coordinator Outreach  Outreach attempted x 1.  Left message on patient's voicemail with call back information and requested return call.  Plan: Care Coordinator will try to reach patient again in 1-2 business days.        NAA Conner  [phone]  [facility]

## 2022-02-22 NOTE — DISCHARGE SUMMARY
[facility]  Discharge Summary - Medicine & Pediatrics       Date of Admission:  2/18/2022  Date of Discharge:  2/21/2022  Discharging Provider: Dr. Parish Lopez  Discharge Service: Medicine Service, JORGE TEAM 2    Discharge Diagnoses   Recurrent SBO  Type-2 DM  HIV/AIDS    Follow-ups Needed After Discharge   Follow-up Appointments     Adult [facility] Follow-up and recommended labs and tests      Follow up with primary care provider, Ayala Prieto, within 7-14 days   to monitor abdominal pain. No labs needed.    Appointments on [place] and/or [facility] (with [facility] or [facility]   provider or service). Call [phone] if you haven't heard regarding   these appointments within 7 days of discharge.         GI follow-up appointment within 4 weeks for continued abdominal pain and recurrent SBO treatment and management  Endocrinology follow-up appointment on 3/31/22 for T2DM management  Infectious Disease follow-up appointment on 3/8/22 for HIV/AIDS management  PCP follow-up in 1-2 weeks for chronic disease management and pain control.    Discharge Disposition   Discharged to home  Condition at discharge: Stable    Hospital Course   Andrew Lockwood was admitted on 2/18/2022 for recurrent SBO that resolved with conservative management. The following problems were addressed during his hospitalization:    # Recurrent SBO  This patient has a history of reoccuring SBO's that have resolved nonoperatively over the last few hospitalizations (2/5/22-2/8/22, 1/22/22-1/25/22). He has had two exploratory laparotomies in the past (2016,2018) with no source of obstruction noted on either occasion. CT on 10/5/18 showed focal small bowel enteritis with associated wall thickening and narrowing small bowel dilation proximal to the inflammation. He has had chronic constipation since childhood. He has had negative workup for Crohn's disease including negative EGD and colonoscopy. The most  recent CT scan shows nonobstructive bowel gas pattern, significant distention of the urinary bladder without apparent hydronephrosis. Abdominal US reports 5mm gallbladder polyp otherwise normal. Reviewing the EMR, surgery has been consulted in the past and recommended diagnostic laparoscopy w/ ANASTASIIA but patient declined and was able to improve clinically and discharge without surgical intervention. Followed by colorectal surgery and GI with multidisciplinary work-up for recurrent SBO of unknown etiology and lost to follow up. NGT placed on admission and within one day, patient had return of bowel function. He was able to advance his diet with minimal nausea and abdominal pain. GI was contacted and will set up a follow-up appointment to continue management of this recurrent SBO issue.     # T2DM  A1c = 11.2% on 2/18/22. Was supposed to be discharge on levemir 20 units BID on previous admission, but was sent on 10 units BID. Upon discharge, the inpatient diabetes service reviewed his records and recommended that the patient take Levemir 20 units BID with 5 units of novolog with each meal to help his elevated A1c. Endocrinology will continue to manage and treat his uncontrolled T2DM.     # HIV  # History of CNS Toxoplasmosis  HIV not detectable and most recent CD4 count 519 on 8/24/21. PTA biktarvy was continued and follow-up appointment is scheduled with infectious disease in one month.    Consultations This Hospital Stay   CARE MANAGEMENT / SOCIAL WORK IP CONSULT  SURGERY GENERAL ADULT IP CONSULT  ENDOCRINE DIABETES ADULT IP CONSULT  SOCIAL WORK IP CONSULT    Code Status : Full Code    The patient was discussed with Dr. Parish Lopez.    Nico Mason MD  [facility] [address] [address]  Phone: [phone]  ______________________________________________________________________    Physical Exam   Vital Signs: Temp: 97.1  F (36.2  C) Temp src: Oral BP: 116/79 Pulse: 82   Resp:  18 SpO2: 97 % O2 Device: None (Room air)    Weight: 155 lbs 0 oz     Constitutional: Alert, cooperative  Respiratory: No increased work of breathing, good air exchange, clear to auscultation bilaterally, no crackles or wheezing  Cardiovascular: Normal apical impulse, regular rate and rhythm, normal S1 and S2, no S3 or S4, and no murmur noted  GI: No scars, hyperactive bowel sounds, distended, tender especially in the epigastrum, no masses palpated  Skin: no bruising or bleeding, normal skin color, texture, turgor, no redness, warmth, or swelling  Musculoskeletal: There is no redness, warmth, or swelling of the joints. Motor strength is 5 out of 5 all extremities bilaterally.  Tone is normal.  Neurologic: Awake, alert, oriented to name, place and time.    {Primary Care Physician   Ayala Prieto    Discharge Orders      Adult Gastro Ref - Consult Only      Reason for your hospital stay    You were admitted with a small bowel obstruction that resolved with stomach decompression. You were able to have bowel movements and have pain controlled. You are discharging with follow-ups with endocrinology, GI, and PCP.     Activity    Your activity upon discharge: activity as tolerated     Adult [facility] Follow-up and recommended labs and tests    Follow up with primary care provider, Ayala Prieto, within 7-14 days to monitor abdominal pain. No labs needed.    Appointments on [place] and/or [facility] (with [facility] or [facility] provider or service). Call [phone] if you haven't heard regarding these appointments within 7 days of discharge.     Diet    Follow this diet upon discharge: Orders Placed This Encounter      Regular Diet Adult       Significant Results and Procedures   Results for orders placed or performed during the hospital encounter of 02/18/22   XR Abdomen 2 Views    Narrative    EXAMINATION:  XR ABDOMEN 2VIEWS 2/18/2022 5:53 PM     COMPARISON: Radiograph 1/23/2022 and CT 1/22/2022.    HISTORY: eval  sbo    TECHNIQUE: Frontal upright and supine views of the abdomen.    FINDINGS: Retained contrast is present in the ureters and bladder. No  evidence of hydronephrosis. The urinary bladder is significantly  distended. Nonobstructive bowel gas pattern. No abnormally gaseous  distended loops of bowel, definite pneumatosis, or portal venous gas.  Visualized lung bases are clear. Penile prosthesis.       Impression    IMPRESSION: Nonobstructive bowel gas pattern. Significant distention  of the urinary bladder without apparent hydronephrosis.    I have personally reviewed the examination and initial interpretation  and I agree with the findings.    JANIE VANN MD         SYSTEM ID:  [id number]   US Abdomen Limited (RUQ)    Narrative    EXAM: Limited Abdominal Ultrasound, 2/18/2022     HISTORY: Epigastric pain.    COMPARISON: CT 1/22/2022.    FINDINGS:     Liver: 14.2 cm craniocaudally with normal echotexture. No focal mass.  The main portal vein is patent with antegrade flow.    Gallbladder: Echogenic, nonshadowing and nonmobile 5 x 4 mm focus  along the superior wall of the gallbladder. No mobile or shadowing  stones or pericholecystic fluid. Sonographic Mendes's sign: negative.    Bile Ducts: normal caliber.  CBD diameter: 3 mm.    Pancreas: visualized portions are unremarkable.    Kidney: 9.6 cm in length. No shadowing stones, hydronephrosis, mass or  suspicious cyst.    Ascites: none.    Visualized aorta and IVC are within normal limits.      Impression    IMPRESSION:   5mm gallbladder polyp. Otherwise normal sonographic evaluation of the  right upper quadrant without gallstones or evidence for acute  cholecystitis.    I have personally reviewed the examination and initial interpretation  and I agree with the findings.    JANIE VANN MD         SYSTEM ID:  [id number]   CT Abdomen Pelvis w Contrast    Narrative    EXAM: CT ABDOMEN AND PELVIS WITH CONTRAST  LOCATION: [facility]  DATE/TIME: 02/19/2022, 5:22 AM    INDICATION: Bowel obstruction suspected.  COMPARISON: None.  TECHNIQUE: CT scan of the abdomen and pelvis was performed following injection of IV contrast. Multiplanar reformats were obtained. Dose reduction techniques were used.  CONTRAST: Iopamidol (isovue 370) solution 95 mL.    FINDINGS:   LOWER CHEST: Small hiatal hernia.    HEPATOBILIARY: Normal.    PANCREAS: Normal.    SPLEEN: Normal.    ADRENAL GLANDS: Normal.    KIDNEYS/BLADDER: Normal.    BOWEL: Dilated distended loops of small bowel measuring up to 5.2 cm with a short segment area of focal mural thickening. No free air or fluid.    LYMPH NODES: Normal.    VASCULATURE: Unremarkable.    PELVIC ORGANS: Prosthetic penile pump.    MUSCULOSKELETAL: Normal.      Impression    IMPRESSION:   1.  Dilated distended loop of small bowel with short segment area of focal mural thickening and no clearly delineated transition point, query focal enteritis, correlate for infectious, and inflammatory etiologies.  2.  No obstructing renal or ureteral stones. No hydroureteronephrosis.     XR Abdomen Port 1 View    Narrative    EXAM: XR ABDOMEN PORT 1 VIEWS  2/19/2022 11:23 AM     HISTORY:  NGT placement verification       COMPARISON:  CT abdomen 2/19/2022 at 5:17 AM    FINDINGS:   Portable supine AP view of the abdomen. Enteric tube with tip and  sidehole projecting in the stomach. Partial contrast opacification of  the stomach and presumably part of the urinary bladder. No definite  evidence of pneumatosis. included osseous structures and soft tissues  are within normal limits. Borderline air-filled distention of  transverse colon.      Impression    IMPRESSION: Nasogastric tube with tip and sidehole projecting over  stomach    I have personally reviewed the examination and initial interpretation  and I agree with the findings.    LAURYN FOURNIER MD         SYSTEM ID:  [id number]   XR Gastrografin Challenge    Narrative    XR  Gastrografin challenge on 2/20/2022 4:06 AM.    INDICATION: Suspected bowel obstruction.    COMPARISON: Radiograph and CT dated 2/19/2022    FINDINGS:   Portable AP supine radiograph of the abdomen. Gastric tube projects  over the stomach. Contrast is seen throughout the colon and rectum. No  abnormally dilated loops of small bowel. Resolved air distention of  the stomach. No pneumatosis or portal venous gas.      Impression    IMPRESSION:     1. No evidence for small bowel obstruction, with contrast opacifying  the large bowel and rectum.  2. Decreased air distention of the stomach.    I have personally reviewed the examination and initial interpretation  and I agree with the findings.    LAURYN FOURNIER MD         SYSTEM ID:  [id number]     *Note: Due to a large number of results and/or encounters for the requested time period, some results have not been displayed. A complete set of results can be found in Results Review.       Discharge Medications   Discharge Medication List as of 2/21/2022  1:38 PM      START taking these medications    Details   ibuprofen (ADVIL/MOTRIN) 400 MG tablet Take 1 tablet (400 mg) by mouth every 4 hours as needed for moderate pain, Disp-60 tablet, R-0, E-Prescribe      ondansetron (ZOFRAN-ODT) 4 MG ODT tab Take 1 tablet (4 mg) by mouth every 6 hours as needed for nausea or vomiting, Disp-15 tablet, R-0, E-Prescribe         CONTINUE these medications which have CHANGED    Details   bictegravir-emtricitabine-tenofovir (BIKTARVY) -25 MG per tablet Take 1 tablet by mouth daily, Disp-90 tablet, R-0, E-Prescribe      polyethylene glycol (MIRALAX) 17 GM/Dose powder Take 17 g by mouth 2 times daily, Disp-3060 g, R-0, E-Prescribe      SENNA-docusate sodium (SENNA S) 8.6-50 MG tablet Take 1 tablet by mouth 2 times daily as needed (constipation), Disp-30 tablet, R-0, E-Prescribe      NOVOLOG FLEXPEN 100 UNIT/ML soln Inject 10 Units Subcutaneous 3 times daily (with meals), Disp-75 mL, R-0,  OLESYA, E-Prescribe         CONTINUE these medications which have NOT CHANGED    Details   insulin detemir (LEVEMIR PEN) 100 UNIT/ML pen Inject 20 Units Subcutaneous 2 times daily, Disp-15 mL, R-0, E-Prescribe      pantoprazole (PROTONIX) 40 MG EC tablet Take 1 tablet (40 mg) by mouth daily, Disp-30 tablet, R-6, E-Prescribe      Alcohol Swabs PADS Use to swab the area of the injection or todd as directed Per insurance coverage, Disp-100 each, R-0, E-Prescribe      blood glucose (NO BRAND SPECIFIED) test strip Use to test blood sugar 4 times daily or as directed. To accompany:whatever is covered, Disp-360 strip, R-3, E-Prescribe      blood glucose calibration (NO BRAND SPECIFIED) solution Used to calibrate the blood glucose monitor as needed and as directed.  To accompany  blood glucose brands per insurance coverage, Disp-1 each, R-0, E-Prescribe      blood glucose monitoring (NO BRAND SPECIFIED) meter device kit Use as directed. Per insurance coverage.Disp-1 kit, [id number]      glucose 40 % (400 mg/mL) gel 15 g every 15 minutes by mouth as needed for low blood sugar. Oral gel is preferable for conscious and able to swallow patient.Disp-112.5 g, [id number]      insulin pen needle (BD SCOTT U/F) 32G X 4 MM miscellaneous USE 3 TO 4 NEEDLES DAILYDisp-400 each, [id number]      Sharps Container MISC Use as directed to dispose of needles, lancets and other sharps. Per Insurance coverage, Disp-1 each, R-0, E-Prescribe      STATIN NOT PRESCRIBED (INTENTIONAL) Reason Statin was Not Prescribed: OTHER - Enter reason is comments section (This option does NOTexclude patient from measure) / noted in chart that is recommended multiple timesNo Print Out      thin (NO BRAND SPECIFIED) lancets Use with lanceting device. To accompany: Test 4 times daily with whatever is covered, Disp-360 each, R-3, E-Prescribe           Allergies   Allergies   Allergen Reactions     Metformin      Abdominal pain     Tylenol [Acetaminophen]  "Itching     Dulaglutide Rash     Insulin Lispro Rash     Patient reported     Penicillin V Other (See Comments) and Rash     Diffuse maculopapular rash + feels \"high\", per pt.      "

## 2022-02-23 ENCOUNTER — PATIENT OUTREACH (OUTPATIENT)
Dept: GASTROENTEROLOGY | Facility: CLINIC | Age: 57
End: 2022-02-23
Payer: COMMERCIAL

## 2022-02-23 NOTE — PROGRESS NOTES
Clinic Care Coordination Contact  [facility]/Voicemail       Clinical Data: Care Coordinator Outreach  Outreach attempted x 2.  Left message on patient's voicemail with call back information and requested return call.  Plan: Care Coordinator will do no further outreaches at this time.        NAA Conner  [phone]  [facility]

## 2022-03-07 NOTE — PLAN OF CARE
Outpatient/Observation goals to be met before discharge home:     Dyspnea improved and oxygen saturations greater than 88% on room air or prior home oxygen levels: Yes  - Tolerates oral antibiotics or has plans for home infusion set up.: No  - Vital signs normal or at patient baseline: Yes  - Infection is improving: On-going  - Return to baseline functional status: In-process  - Safe disposition plan has been identified: In-process   Controlled with current regime

## 2022-03-14 ENCOUNTER — APPOINTMENT (OUTPATIENT)
Dept: GENERAL RADIOLOGY | Facility: CLINIC | Age: 57
End: 2022-03-14
Attending: EMERGENCY MEDICINE
Payer: COMMERCIAL

## 2022-03-14 ENCOUNTER — HOSPITAL ENCOUNTER (INPATIENT)
Facility: CLINIC | Age: 57
LOS: 3 days | Discharge: HOME | End: 2022-03-17
Attending: EMERGENCY MEDICINE | Admitting: INTERNAL MEDICINE
Payer: COMMERCIAL

## 2022-03-14 DIAGNOSIS — Z79.4 TYPE 2 DIABETES MELLITUS WITH HYPERGLYCEMIA, WITH LONG-TERM CURRENT USE OF INSULIN (H): Primary | ICD-10-CM

## 2022-03-14 DIAGNOSIS — Z21 ASYMPTOMATIC HUMAN IMMUNODEFICIENCY VIRUS (HIV) INFECTION STATUS (H): ICD-10-CM

## 2022-03-14 DIAGNOSIS — E11.65 TYPE 2 DIABETES MELLITUS WITH HYPERGLYCEMIA, WITH LONG-TERM CURRENT USE OF INSULIN (H): Primary | ICD-10-CM

## 2022-03-14 DIAGNOSIS — K56.609 SBO (SMALL BOWEL OBSTRUCTION) (H): ICD-10-CM

## 2022-03-14 LAB
ALBUMIN SERPL-MCNC: 3.5 G/DL (ref 3.4–5)
ALP SERPL-CCNC: 130 U/L (ref 40–150)
ALT SERPL W P-5'-P-CCNC: 16 U/L (ref 0–70)
ANION GAP SERPL CALCULATED.3IONS-SCNC: 7 MMOL/L (ref 3–14)
AST SERPL W P-5'-P-CCNC: 16 U/L (ref 0–45)
BASOPHILS # BLD AUTO: 0.1 10E3/UL (ref 0–0.2)
BASOPHILS NFR BLD AUTO: 1 %
BILIRUB SERPL-MCNC: 0.5 MG/DL (ref 0.2–1.3)
BUN SERPL-MCNC: 17 MG/DL (ref 7–30)
CALCIUM SERPL-MCNC: 9.4 MG/DL (ref 8.5–10.1)
CHLORIDE BLD-SCNC: 105 MMOL/L (ref 94–109)
CO2 SERPL-SCNC: 26 MMOL/L (ref 20–32)
CREAT SERPL-MCNC: 0.7 MG/DL (ref 0.66–1.25)
EOSINOPHIL # BLD AUTO: 0.2 10E3/UL (ref 0–0.7)
EOSINOPHIL NFR BLD AUTO: 2 %
ERYTHROCYTE [DISTWIDTH] IN BLOOD BY AUTOMATED COUNT: 13.3 % (ref 10–15)
GFR SERPL CREATININE-BSD FRML MDRD: >90 ML/MIN/1.73M2
GLUCOSE BLD-MCNC: 189 MG/DL (ref 70–99)
GLUCOSE BLDC GLUCOMTR-MCNC: 190 MG/DL (ref 70–99)
GLUCOSE BLDC GLUCOMTR-MCNC: 232 MG/DL (ref 70–99)
HCT VFR BLD AUTO: 39.9 % (ref 40–53)
HGB BLD-MCNC: 12.8 G/DL (ref 13.3–17.7)
IMM GRANULOCYTES # BLD: 0.1 10E3/UL
IMM GRANULOCYTES NFR BLD: 1 %
LIPASE SERPL-CCNC: 134 U/L (ref 73–393)
LYMPHOCYTES # BLD AUTO: 2.5 10E3/UL (ref 0.8–5.3)
LYMPHOCYTES NFR BLD AUTO: 24 %
MCH RBC QN AUTO: 27.8 PG (ref 26.5–33)
MCHC RBC AUTO-ENTMCNC: 32.1 G/DL (ref 31.5–36.5)
MCV RBC AUTO: 87 FL (ref 78–100)
MONOCYTES # BLD AUTO: 0.5 10E3/UL (ref 0–1.3)
MONOCYTES NFR BLD AUTO: 5 %
NEUTROPHILS # BLD AUTO: 7.3 10E3/UL (ref 1.6–8.3)
NEUTROPHILS NFR BLD AUTO: 67 %
NRBC # BLD AUTO: 0 10E3/UL
NRBC BLD AUTO-RTO: 0 /100
PLATELET # BLD AUTO: 335 10E3/UL (ref 150–450)
POTASSIUM BLD-SCNC: 3.6 MMOL/L (ref 3.4–5.3)
PROT SERPL-MCNC: 8.1 G/DL (ref 6.8–8.8)
RBC # BLD AUTO: 4.6 10E6/UL (ref 4.4–5.9)
SODIUM SERPL-SCNC: 138 MMOL/L (ref 133–144)
WBC # BLD AUTO: 10.6 10E3/UL (ref 4–11)

## 2022-03-14 PROCEDURE — 74019 RADEX ABDOMEN 2 VIEWS: CPT

## 2022-03-14 PROCEDURE — 99222 1ST HOSP IP/OBS MODERATE 55: CPT | Mod: AI | Performed by: INTERNAL MEDICINE

## 2022-03-14 PROCEDURE — 96376 TX/PRO/DX INJ SAME DRUG ADON: CPT | Performed by: EMERGENCY MEDICINE

## 2022-03-14 PROCEDURE — 250N000013 HC RX MED GY IP 250 OP 250 PS 637

## 2022-03-14 PROCEDURE — 250N000013 HC RX MED GY IP 250 OP 250 PS 637: Performed by: STUDENT IN AN ORGANIZED HEALTH CARE EDUCATION/TRAINING PROGRAM

## 2022-03-14 PROCEDURE — 96361 HYDRATE IV INFUSION ADD-ON: CPT | Performed by: EMERGENCY MEDICINE

## 2022-03-14 PROCEDURE — 250N000009 HC RX 250: Performed by: STUDENT IN AN ORGANIZED HEALTH CARE EDUCATION/TRAINING PROGRAM

## 2022-03-14 PROCEDURE — 258N000003 HC RX IP 258 OP 636: Performed by: EMERGENCY MEDICINE

## 2022-03-14 PROCEDURE — 99285 EMERGENCY DEPT VISIT HI MDM: CPT | Mod: 25 | Performed by: EMERGENCY MEDICINE

## 2022-03-14 PROCEDURE — 99285 EMERGENCY DEPT VISIT HI MDM: CPT | Performed by: EMERGENCY MEDICINE

## 2022-03-14 PROCEDURE — 36415 COLL VENOUS BLD VENIPUNCTURE: CPT | Performed by: EMERGENCY MEDICINE

## 2022-03-14 PROCEDURE — 250N000009 HC RX 250

## 2022-03-14 PROCEDURE — 250N000012 HC RX MED GY IP 250 OP 636 PS 637: Performed by: STUDENT IN AN ORGANIZED HEALTH CARE EDUCATION/TRAINING PROGRAM

## 2022-03-14 PROCEDURE — 80053 COMPREHEN METABOLIC PANEL: CPT | Performed by: EMERGENCY MEDICINE

## 2022-03-14 PROCEDURE — 83690 ASSAY OF LIPASE: CPT | Performed by: EMERGENCY MEDICINE

## 2022-03-14 PROCEDURE — 96375 TX/PRO/DX INJ NEW DRUG ADDON: CPT | Performed by: EMERGENCY MEDICINE

## 2022-03-14 PROCEDURE — 999N000127 HC STATISTIC PERIPHERAL IV START W US GUIDANCE

## 2022-03-14 PROCEDURE — 96374 THER/PROPH/DIAG INJ IV PUSH: CPT | Performed by: EMERGENCY MEDICINE

## 2022-03-14 PROCEDURE — 74019 RADEX ABDOMEN 2 VIEWS: CPT | Mod: 26 | Performed by: STUDENT IN AN ORGANIZED HEALTH CARE EDUCATION/TRAINING PROGRAM

## 2022-03-14 PROCEDURE — 250N000011 HC RX IP 250 OP 636: Performed by: EMERGENCY MEDICINE

## 2022-03-14 PROCEDURE — 120N000002 HC R&B MED SURG/OB UMMC

## 2022-03-14 PROCEDURE — 85025 COMPLETE CBC W/AUTO DIFF WBC: CPT | Performed by: EMERGENCY MEDICINE

## 2022-03-14 PROCEDURE — 250N000011 HC RX IP 250 OP 636: Performed by: STUDENT IN AN ORGANIZED HEALTH CARE EDUCATION/TRAINING PROGRAM

## 2022-03-14 RX ORDER — ONDANSETRON 2 MG/ML
4 INJECTION INTRAMUSCULAR; INTRAVENOUS EVERY 6 HOURS PRN
Status: DISCONTINUED | OUTPATIENT
Start: 2022-03-14 | End: 2022-03-17 | Stop reason: HOSPADM

## 2022-03-14 RX ORDER — NALOXONE HYDROCHLORIDE 0.4 MG/ML
0.2 INJECTION, SOLUTION INTRAMUSCULAR; INTRAVENOUS; SUBCUTANEOUS
Status: DISCONTINUED | OUTPATIENT
Start: 2022-03-14 | End: 2022-03-16

## 2022-03-14 RX ORDER — LIDOCAINE 40 MG/G
CREAM TOPICAL
Status: DISCONTINUED | OUTPATIENT
Start: 2022-03-14 | End: 2022-03-17 | Stop reason: HOSPADM

## 2022-03-14 RX ORDER — NALOXONE HYDROCHLORIDE 0.4 MG/ML
0.4 INJECTION, SOLUTION INTRAMUSCULAR; INTRAVENOUS; SUBCUTANEOUS
Status: DISCONTINUED | OUTPATIENT
Start: 2022-03-14 | End: 2022-03-16

## 2022-03-14 RX ORDER — HYDROMORPHONE HYDROCHLORIDE 1 MG/ML
0.5 INJECTION, SOLUTION INTRAMUSCULAR; INTRAVENOUS; SUBCUTANEOUS
Status: COMPLETED | OUTPATIENT
Start: 2022-03-14 | End: 2022-03-14

## 2022-03-14 RX ORDER — HYDROMORPHONE HCL IN WATER/PF 6 MG/30 ML
0.2 PATIENT CONTROLLED ANALGESIA SYRINGE INTRAVENOUS
Status: DISCONTINUED | OUTPATIENT
Start: 2022-03-14 | End: 2022-03-14

## 2022-03-14 RX ORDER — DEXTROSE MONOHYDRATE 25 G/50ML
25-50 INJECTION, SOLUTION INTRAVENOUS
Status: DISCONTINUED | OUTPATIENT
Start: 2022-03-14 | End: 2022-03-17 | Stop reason: HOSPADM

## 2022-03-14 RX ORDER — PROCHLORPERAZINE MALEATE 10 MG
10 TABLET ORAL EVERY 6 HOURS PRN
Status: DISCONTINUED | OUTPATIENT
Start: 2022-03-14 | End: 2022-03-17 | Stop reason: HOSPADM

## 2022-03-14 RX ORDER — PROCHLORPERAZINE 25 MG
25 SUPPOSITORY, RECTAL RECTAL EVERY 12 HOURS PRN
Status: DISCONTINUED | OUTPATIENT
Start: 2022-03-14 | End: 2022-03-17 | Stop reason: HOSPADM

## 2022-03-14 RX ORDER — NICOTINE POLACRILEX 4 MG
15-30 LOZENGE BUCCAL
Status: DISCONTINUED | OUTPATIENT
Start: 2022-03-14 | End: 2022-03-17 | Stop reason: HOSPADM

## 2022-03-14 RX ORDER — HYDROMORPHONE HYDROCHLORIDE 1 MG/ML
0.3 INJECTION, SOLUTION INTRAMUSCULAR; INTRAVENOUS; SUBCUTANEOUS EVERY 4 HOURS PRN
Status: DISCONTINUED | OUTPATIENT
Start: 2022-03-14 | End: 2022-03-15

## 2022-03-14 RX ORDER — ONDANSETRON 4 MG/1
4 TABLET, ORALLY DISINTEGRATING ORAL EVERY 6 HOURS PRN
Status: DISCONTINUED | OUTPATIENT
Start: 2022-03-14 | End: 2022-03-17 | Stop reason: HOSPADM

## 2022-03-14 RX ORDER — ONDANSETRON 2 MG/ML
4 INJECTION INTRAMUSCULAR; INTRAVENOUS EVERY 30 MIN PRN
Status: DISCONTINUED | OUTPATIENT
Start: 2022-03-14 | End: 2022-03-15

## 2022-03-14 RX ORDER — SODIUM CHLORIDE 9 MG/ML
INJECTION, SOLUTION INTRAVENOUS CONTINUOUS
Status: DISCONTINUED | OUTPATIENT
Start: 2022-03-14 | End: 2022-03-16

## 2022-03-14 RX ADMIN — HYDROMORPHONE HYDROCHLORIDE 0.5 MG: 1 INJECTION, SOLUTION INTRAMUSCULAR; INTRAVENOUS; SUBCUTANEOUS at 12:28

## 2022-03-14 RX ADMIN — HYDROMORPHONE HYDROCHLORIDE 0.5 MG: 1 INJECTION, SOLUTION INTRAMUSCULAR; INTRAVENOUS; SUBCUTANEOUS at 09:31

## 2022-03-14 RX ADMIN — HYDROMORPHONE HYDROCHLORIDE 0.5 MG: 1 INJECTION, SOLUTION INTRAMUSCULAR; INTRAVENOUS; SUBCUTANEOUS at 13:56

## 2022-03-14 RX ADMIN — SODIUM CHLORIDE 1000 ML: 9 INJECTION, SOLUTION INTRAVENOUS at 09:31

## 2022-03-14 RX ADMIN — LIDOCAINE HYDROCHLORIDE 30 ML: 20 SOLUTION ORAL; TOPICAL at 20:51

## 2022-03-14 RX ADMIN — LIDOCAINE HYDROCHLORIDE 30 ML: 20 SOLUTION ORAL; TOPICAL at 13:55

## 2022-03-14 RX ADMIN — HYDROMORPHONE HYDROCHLORIDE 0.3 MG: 1 INJECTION, SOLUTION INTRAMUSCULAR; INTRAVENOUS; SUBCUTANEOUS at 19:30

## 2022-03-14 RX ADMIN — INSULIN DETEMIR 10 UNITS: 100 INJECTION, SOLUTION SUBCUTANEOUS at 20:52

## 2022-03-14 RX ADMIN — ONDANSETRON 4 MG: 2 INJECTION INTRAMUSCULAR; INTRAVENOUS at 19:30

## 2022-03-14 RX ADMIN — ONDANSETRON 4 MG: 2 INJECTION INTRAMUSCULAR; INTRAVENOUS at 12:28

## 2022-03-14 ASSESSMENT — ACTIVITIES OF DAILY LIVING (ADL)
ADLS_ACUITY_SCORE: 6
ADLS_ACUITY_SCORE: 6
ADLS_ACUITY_SCORE: 5
ADLS_ACUITY_SCORE: 6
ADLS_ACUITY_SCORE: 6
ADLS_ACUITY_SCORE: 5
ADLS_ACUITY_SCORE: 6
ADLS_ACUITY_SCORE: 6
ADLS_ACUITY_SCORE: 5

## 2022-03-14 ASSESSMENT — ENCOUNTER SYMPTOMS
NAUSEA: 1
FEVER: 0
VOMITING: 1
ABDOMINAL PAIN: 1

## 2022-03-14 NOTE — ED PROVIDER NOTES
[facility] ([facility] [place])  3/14/22 ED 19    History     Chief Complaint   Patient presents with     GI Problem     small bowel obstruction pain     HPI  Andrew Lockwood is a 54 year old male with complex past medical history including HIV, type 2 diabetes and recurrent small bowel obstructions who presents with abdominal pain.  He feels that this is a recurrence of small bowel obstruction.  His small bowel obstructions have resolved nonoperatively over the last few hospitalizations (2/18-2/21/2022, 2/5/22-2/8/22, 1/22/22-1/25/22).  His most recent small bowel obstruction was treated with NG tube with improvement of symptoms.  He developed recurrence of abdominal pain, nausea and vomiting around 10 AM yesterday and presents to the emergency department for evaluation of this.  He feels that this is similar to prior small bowel obstructions.  Denies passing gas.  States that his last BM was yesterday and it was diarrhea.  Endorses LUQ pain.  Denies fevers.  Patient states that he has been homeless for 3 years and has not gotten surgery for his SBO's because he does not have a safe place to recuperate.    Past Medical History  Past Medical History:   Diagnosis Date     AIDS (H)      Allergic rhinitis due to other allergen     DNS     Anal dysplasia      Chronic abdominal pain      CNS toxoplasmosis (H)      Diabetes type 2, controlled (H)      GERD (gastroesophageal reflux disease)      History of seizure      History of substance abuse (H)      HIV (human immunodeficiency virus infection) (H)      HLD (hyperlipidemia)      Lung nodules      Periungual wart      PTSD (post-traumatic stress disorder)      Sleep apnea     doesn't use CPAP     Past Surgical History:   Procedure Laterality Date     ANOSCOPY N/A 9/9/2020    Procedure: Exam Under Anesthesia, ANOSCOPY, fulgeration of rectal fissures with Rectal Biopsies;  Surgeon: Thanh Lundberg MD;  Location: [facility] OR     COLONOSCOPY Left 1/22/2016     Procedure: COMBINED COLONOSCOPY, SINGLE OR MULTIPLE BIOPSY/POLYPECTOMY BY BIOPSY;  Surgeon: Clark Saini MD;  Location: [facility] GI     HC EXPLORE UNDESC TESTIS,INGUIN/SCROTAL       LAPAROSCOPIC APPENDECTOMY N/A 1/31/2018    Procedure: LAPAROSCOPIC APPENDECTOMY;  LAPAROSCOPIC APPENDECTOMY;  Surgeon: Dawn Holt MD;  Location: [facility] OR     LAPAROSCOPY DIAGNOSTIC (GENERAL) N/A 7/26/2016    Procedure: LAPAROSCOPY DIAGNOSTIC (GENERAL);  Surgeon: Susannah Arriaga MD;  Location: [facility] OR     LAPAROSCOPY DIAGNOSTIC (GENERAL) N/A 4/16/2018    Procedure: LAPAROSCOPY DIAGNOSTIC (GENERAL);  Diagnostic laparoscopy and lysis of adhesions;  Surgeon: Prince Dowling MD;  Location: [facility] OR     OPTICAL TRACKING SYSTEM CRANIOTOMY, EXCISE TUMOR, COMBINED Left 4/10/2015    Procedure: COMBINED OPTICAL TRACKING SYSTEM CRANIOTOMY, EXCISE TUMOR;  Surgeon: Mirlande Colmenares MD;  Location: [facility] OR     REPAIR GAMEKEEPER'S THUMB Right 12/2/2016    Procedure: REPAIR LIGAMENT ULNAR COLLATERAL THUMB (GAMEKEEPER'S);  Surgeon: Evin Zamorano MD;  Location: [facility] OR     ZZC NONSPECIFIC PROCEDURE      right forearm fracture     Alcohol Swabs PADS  bictegravir-emtricitabine-tenofovir (BIKTARVY) -25 MG per tablet  blood glucose (NO BRAND SPECIFIED) test strip  blood glucose calibration (NO BRAND SPECIFIED) solution  blood glucose monitoring (NO BRAND SPECIFIED) meter device kit  glucose 40 % (400 mg/mL) gel  ibuprofen (ADVIL/MOTRIN) 400 MG tablet  insulin detemir (LEVEMIR PEN) 100 UNIT/ML pen  insulin pen needle (BD SCOTT U/F) 32G X 4 MM miscellaneous  NOVOLOG FLEXPEN 100 UNIT/ML soln  ondansetron (ZOFRAN-ODT) 4 MG ODT tab  pantoprazole (PROTONIX) 40 MG EC tablet  polyethylene glycol (MIRALAX) 17 GM/Dose powder  SENNA-docusate sodium (SENNA S) 8.6-50 MG tablet  Sharps Container MISC  STATIN NOT PRESCRIBED (INTENTIONAL)  thin (NO BRAND SPECIFIED) lancets      Allergies   Allergen Reactions     Metformin      Abdominal pain  "    Tylenol [Acetaminophen] Itching     Dulaglutide Rash     Insulin Lispro Rash     Patient reported     Penicillin V Other (See Comments) and Rash     Diffuse maculopapular rash + feels \"high\", per pt.      Family History  Family History   Problem Relation Age of Onset     Diabetes Brother      Diabetes Father      Alzheimer Disease Father      Unknown/Adopted Mother      Diabetes Paternal Grandfather      Cancer No family hx of         no skin cancer     Skin Cancer No family hx of         no famiy hx of skin cancer     Glaucoma No family hx of      Macular Degeneration No family hx of      Social History   Social History     Tobacco Use     Smoking status: Current Every Day Smoker     Packs/day: 0.25     Years: 40.00     Pack years: 10.00     Types: Cigarettes     Smokeless tobacco: Former User   Substance Use Topics     Alcohol use: No     Alcohol/week: 0.0 standard drinks     Comment: Last etoh in 2007     Drug use: Not Currently     Types: Marijuana, Methamphetamines      Past medical history, past surgical history, medications, allergies, family history, and social history were reviewed with the patient. No additional pertinent items.       Review of Systems   Constitutional: Negative for fever.   Gastrointestinal: Positive for abdominal pain, nausea and vomiting.   All other systems reviewed and are negative.    A complete review of systems was performed with pertinent positives and negatives noted in the HPI, and all other systems negative.    Physical Exam   BP: (!) 144/109  Pulse: 81  Temp: 98.2  F (36.8  C)  Resp: 18  SpO2: 99 %  Physical Exam  Vitals and nursing note reviewed.   Constitutional:       General: He is not in acute distress.     Appearance: Normal appearance. He is well-developed. He is not ill-appearing or diaphoretic.   HENT:      Head: Normocephalic and atraumatic.      Nose: Nose normal.      Mouth/Throat:      Mouth: Mucous membranes are moist.   Eyes:      General: No scleral " icterus.     Conjunctiva/sclera: Conjunctivae normal.   Cardiovascular:      Rate and Rhythm: Normal rate.   Pulmonary:      Effort: Pulmonary effort is normal. No respiratory distress.      Breath sounds: No stridor.   Abdominal:      General: There is no distension.      Palpations: Abdomen is soft.      Tenderness: There is abdominal tenderness in the right upper quadrant, epigastric area, periumbilical area and left upper quadrant. There is guarding. There is no rebound.   Musculoskeletal:         General: No deformity or signs of injury. Normal range of motion.      Cervical back: Normal range of motion and neck supple. No rigidity.   Skin:     General: Skin is warm and dry.      Coloration: Skin is not jaundiced or pale.      Findings: No rash.   Neurological:      General: No focal deficit present.      Mental Status: He is alert and oriented to person, place, and time.   Psychiatric:         Mood and Affect: Mood normal.         Behavior: Behavior normal.         Thought Content: Thought content normal.         ED Course     10:29 AM  The patient was seen and examined by Jeannine Moses MD in Room ED19.     Procedures              Results for orders placed or performed during the hospital encounter of 03/14/22   XR Abdomen 2 Views     Status: None    Narrative    XR ABDOMEN 2VIEWS  3/14/2022 9:57 AM      HISTORY: sbo    COMPARISON: 2/19/2022, 2/20/2022    FINDINGS: AP upright and supine views of the abdomen. There are a few  loops of dilated small bowel in the central abdomen with associated  air-fluid levels. No definite free air or pneumatosis. Small stomach  bubble. Lung bases are relatively clear.      Impression    IMPRESSION: Few dilated loops of small bowel in the central abdomen  with air-fluid levels, suggesting small bowel obstruction.     I have personally reviewed the examination and initial interpretation  and I agree with the findings.    CHARLIE BERTRAND MD         SYSTEM ID:  [id number]   CBC with  platelets and differential     Status: Abnormal   Result Value Ref Range    WBC Count 10.6 4.0 - 11.0 10e3/uL    RBC Count 4.60 4.40 - 5.90 10e6/uL    Hemoglobin 12.8 (L) 13.3 - 17.7 g/dL    Hematocrit 39.9 (L) 40.0 - 53.0 %    MCV 87 78 - 100 fL    MCH 27.8 26.5 - 33.0 pg    MCHC 32.1 31.5 - 36.5 g/dL    RDW 13.3 10.0 - 15.0 %    Platelet Count 335 150 - 450 10e3/uL    % Neutrophils 67 %    % Lymphocytes 24 %    % Monocytes 5 %    % Eosinophils 2 %    % Basophils 1 %    % Immature Granulocytes 1 %    NRBCs per 100 WBC 0 <1 /100    Absolute Neutrophils 7.3 1.6 - 8.3 10e3/uL    Absolute Lymphocytes 2.5 0.8 - 5.3 10e3/uL    Absolute Monocytes 0.5 0.0 - 1.3 10e3/uL    Absolute Eosinophils 0.2 0.0 - 0.7 10e3/uL    Absolute Basophils 0.1 0.0 - 0.2 10e3/uL    Absolute Immature Granulocytes 0.1 <=0.4 10e3/uL    Absolute NRBCs 0.0 10e3/uL   CBC with platelets differential     Status: Abnormal    Narrative    The following orders were created for panel order CBC with platelets differential.  Procedure                               Abnormality         Status                     ---------                               -----------         ------                     CBC with platelets and d...[[id number]]  Abnormal            Final result                 Please view results for these tests on the individual orders.     Medications   ondansetron (ZOFRAN) injection 4 mg (has no administration in time range)   0.9% sodium chloride BOLUS (1,000 mLs Intravenous New Bag 3/14/22 0931)     Followed by   sodium chloride 0.9% infusion (has no administration in time range)   HYDROmorphone (PF) (DILAUDID) injection 0.5 mg (0.5 mg Intravenous Given 3/14/22 0931)        Assessments & Plan (with Medical Decision Making)   Andrew Lockwood is a 54 year old male with complex past medical history including HIV, type 2 diabetes and recurrent small bowel obstructions who presents with abdominal pain.     Ddx: recurrent sbo, adhesions,  dehydration    Patient afebrile, mildly hypertensive, normal heart rate and satting 99% on room air.  Symptoms are consistent with patient's history of recurrent SBO's.  He reports absence of flatulence.  Given IV fluids, Zofran, Dilaudid for symptomatic management.  An abdominal x-ray obtained shows air-fluid levels and a few dilated loops of small bowel in the central abdomen consistent with an SBO.  Patient has had multiple CT scans demonstrating this SBO and he has always responded to conservative medical management so I do not think an emergent CT is indicated.    Patient reports that he is interested in having a surgery to resolve his recurrent SBOs.  I told him my understanding was that he was not previously interested in surgery and I wanted to clarify if he had changed his mind.  It looks like surgery has previously offered him a diagnostic laparoscopy with lysis of adhesions but patient was noted to decline.  The patient currently states that he declined because he is homeless and has no place to recover from his surgery.  He states that he had a bad experience in the past when he had an appendectomy and [place] the train and developed a postoperative infection.  He states he would like to have a surgery or do something to prevent him from having to come back with recurrent small bowel obstructions but he needs a place to stay because he is still homeless.  Patient's SBO's have always resolved with medical management.  He currently declines an NG tube because he is not having any vomiting.  I think this is appropriate for now.  We will admit him to medicine for ongoing management and repeat surgery consult.  I encouraged the patient to discuss his wishes for definitive operative management with surgery.  Social work/case management may be able to help arrange for an acute rehab stay or alternative resources to help with his recovery following a surgery.  This would be a nonemergent elective  surgery if it is still offered so I think a primary medical admission is appropriate.      I have reviewed the nursing notes. I have reviewed the findings, diagnosis, plan and need for follow up with the patient.    New Prescriptions    No medications on file       Final diagnoses:   SBO (small bowel obstruction) (H)     I, Toyin Louis, am serving as a trained medical scribe to document services personally performed by Jeannine Moses MD based on the provider's statements to me on March 14, 2022.  This document has been checked and approved by the attending provider.    I, Jeannine Moses MD, was physically present and have reviewed and verified the accuracy of this note documented by Toyin Louis, medical scribe.      Jeannine Moses MD      --  Jeannine Moses  [facility] EMERGENCY DEPARTMENT  3/14/2022     Jeannine Moses MD  03/14/22 2191

## 2022-03-14 NOTE — ED TRIAGE NOTES
Pt presents with SBO symptoms, known history of abd pain and SBO, most recent hospitalization about 2 weeks ago.    100 mcg fent 4 Zofran on ems.

## 2022-03-14 NOTE — ED NOTES
Pt ripped out IV and refused to have IVF run at this time. He was happy to hear he as going upstairs, but still didn't want to have a IV put in or take his Biktarvy

## 2022-03-14 NOTE — ED NOTES
Bed: ED19  Expected date:   Expected time:   Means of arrival:   Comments:  [facility] [address]  Constipation

## 2022-03-14 NOTE — PROVIDER NOTIFICATION
"Pgd Dr. Saba at 18    \"Pt requesting pain meds. Had emesis x1 and reported dizziness, BP elevated but WNL. Thanks\"    [facility] #[id number]    Received orders for PRN 0.3 mg dilaudid q3 hrs.   "

## 2022-03-14 NOTE — H&P
[facility]    History and Physical - Medicine Service, MAROON TEAM 1       Date of Admission:  3/14/2022    Assessment & Plan      Andrew Lockwood is a 54 year old male admitted on 3/14/2022. He has a history of recurrent SBO, T2DM (A1c 11.2% 02/18/22), HIV/AIDS previously c/b CNS toxoplasmosis, and GERD with prior and is admitted for abdominal pain c/f recurrence of SBO    Abdominal pain  Small bowel obstruction, partial, recurrence  Recurrent SBO with multiple ED presentations, especially in the past 2-3 months. Prior appendectomy, ex lap with lysis of adhesions (2016, 2018). Last small bowel enteroscopy and colonoscopy (01/2016) without findings on exam or pathology to explain recurrences. Current Lipase, LFTs, WBC wnl. Given narrative of recurrent episodes of pain and concurrent diarrhea, presentations may reflect chronic constipation with encopresis, no PTA bowel regimen.   - NPO except ice chips and meds  - Pt refused NGT  - Pain control: (1) dilaudid 0.3 Q4h PRN pain  - S/p 1L LR in ED  - GI consult placed    T2DM  He describes difficulties with ensuring access to glucose monitoring supplies.   - 1/2-dose LA insulin while NPO  - Medium-dose sliding scale insulin  - Consideration for SGLT2i while inpatient    Housing Insecurity  Presents from shelter   -  follow-up    =================  CHRONIC/RESOLVED  =================  GERD - PTA pantoprazole  HIV w/ Hx CNS toxoplasmosis - Continue PTA biktarvy. VL 0, CD4 500+ (08/2021).        Diet: NPO for Medical/Clinical Reasons Except for: Meds, Ice Chips    DVT Prophylaxis: Low Risk/Ambulatory with no VTE prophylaxis indicated  Potts Catheter: Not present  Fluids: NPO  Central Lines: None  Cardiac Monitoring: None  Code Status: Full Code      Clinically Significant Risk Factors Present on Admission                 # Overweight: Estimated body mass index is 27.21 kg/m  as calculated from the following:    Height as of  "this encounter: 1.626 m (5' 4\").    Weight as of this encounter: 71.9 kg (158 lb 8.2 oz).      Disposition Plan   Expected Discharge:  in 2-3 days pending clinical course   Anticipated discharge location:  Awaiting care coordination huddle  Delays:     None        The patient's care was discussed with the Attending Physician, Dr. Rita Delgado.    Osbaldo Raines MD, PhD  PGY-2 Internal Medcine  [phone] [text page]    Medicine Service, [facility] TEAM 1  [facility]  Securely message with the Vocera Web Console (learn more here)  Text page via [facility] Paging/Directory   Please see signed in provider for up to date coverage information    ______________________________________________________________________    Chief Complaint   Abdominal pain and distention 5 days duration    History is obtained from the patient    History of Present Illness   Andrew Lockwood is a 54 year old male who has a history of recurrent   and is admitted for recurrence of abdominal pain.     Mr. Collins describes a many years long history of recurrent abdominal pain that he associates with small bowel obstruction.  Previously he would have episodes of abdominal pain that would present once every 2 to 3 months, now the same abdominal pain presents every 2 to 3 weeks.  The pain which is focal to the mid abdomen is worsened by food intake, and often accompanied by heartburn, nausea, and emesis.  He describes normally only 2-3 normal bowel movements in between presentations to the emergency department.  His typical bowel movements are diarrhea, which she notes is nonbloody and normal brown color.  He does not describe any preceding fever, shortness of breath, chest pain, radiation of the pain into the back, or rash.  He recalls previous discussions with gastroenterology providers and others who told him to change his diet, and that he has been adherent to these restrictions having cut out peanuts, " chocolate, spicy food, etc.  He is frustrated that during his previous encounters into the emergency department that after a bowel movement he is often discharged, only to have his pain and discomfort return shortly thereafter. He states he can tell when he has an SBO because he becomes obstipated, as he is currently. Last BM yesterday.     Additionally, he describes recurrent difficulty with having test strips to perform blood glucose checks.  He will occasionally have very high blood sugars when he does check, as much as 740, which he associates with blurry vision.  He has occasional lows, as low as the 60s, with associated nausea, diaphoresis, and malaise-this improved with sugar sources such as soda.    Review of Systems    The 10 point Review of Systems is negative other than noted in the HPI or here.     Past Medical History    I have reviewed this patient's medical history and updated it with pertinent information if needed.   Past Medical History:   Diagnosis Date     AIDS (H)      Allergic rhinitis due to other allergen     DNS     Anal dysplasia      Chronic abdominal pain      CNS toxoplasmosis (H)      Diabetes type 2, controlled (H)      GERD (gastroesophageal reflux disease)      History of seizure      History of substance abuse (H)      HIV (human immunodeficiency virus infection) (H)      HLD (hyperlipidemia)      Lung nodules      Periungual wart      PTSD (post-traumatic stress disorder)      Sleep apnea     doesn't use CPAP        Past Surgical History   I have reviewed this patient's surgical history and updated it with pertinent information if needed.  Past Surgical History:   Procedure Laterality Date     ANOSCOPY N/A 9/9/2020    Procedure: Exam Under Anesthesia, ANOSCOPY, fulgeration of rectal fissures with Rectal Biopsies;  Surgeon: Thanh Lundberg MD;  Location: [facility] OR     COLONOSCOPY Left 1/22/2016    Procedure: COMBINED COLONOSCOPY, SINGLE OR MULTIPLE BIOPSY/POLYPECTOMY BY BIOPSY;   Surgeon: Clark Saini MD;  Location: [facility] GI     HC EXPLORE UNDESC TESTIS,INGUIN/SCROTAL       LAPAROSCOPIC APPENDECTOMY N/A 1/31/2018    Procedure: LAPAROSCOPIC APPENDECTOMY;  LAPAROSCOPIC APPENDECTOMY;  Surgeon: Dawn Holt MD;  Location: [facility] OR     LAPAROSCOPY DIAGNOSTIC (GENERAL) N/A 7/26/2016    Procedure: LAPAROSCOPY DIAGNOSTIC (GENERAL);  Surgeon: Susannah Arriaga MD;  Location: [facility] OR     LAPAROSCOPY DIAGNOSTIC (GENERAL) N/A 4/16/2018    Procedure: LAPAROSCOPY DIAGNOSTIC (GENERAL);  Diagnostic laparoscopy and lysis of adhesions;  Surgeon: Prince Dowling MD;  Location: [facility] OR     OPTICAL TRACKING SYSTEM CRANIOTOMY, EXCISE TUMOR, COMBINED Left 4/10/2015    Procedure: COMBINED OPTICAL TRACKING SYSTEM CRANIOTOMY, EXCISE TUMOR;  Surgeon: Mirlande Colmenares MD;  Location: [facility] OR     REPAIR GAMEKEEPER'S THUMB Right 12/2/2016    Procedure: REPAIR LIGAMENT ULNAR COLLATERAL THUMB (GAMEKEEPER'S);  Surgeon: Evin Zamorano MD;  Location:  OR     [facility] NONSPECIFIC PROCEDURE      right forearm fracture        Social History   I have reviewed this patient's social history and updated it with pertinent information if needed. Andrew Lockwood  reports that he has been smoking cigarettes. He has a 10.00 pack-year smoking history. He has quit using smokeless tobacco. He reports previous drug use. Drugs: Marijuana and Methamphetamines. He reports that he does not drink alcohol.    Family History   I have reviewed this patient's family history and updated it with pertinent information if needed.  Family History   Problem Relation Age of Onset     Diabetes Brother      Diabetes Father      Alzheimer Disease Father      Unknown/Adopted Mother      Diabetes Paternal Grandfather      Cancer No family hx of         no skin cancer     Skin Cancer No family hx of         no famiy hx of skin cancer     Glaucoma No family hx of      Macular Degeneration No family hx of        Prior to Admission  Medications   Prior to Admission Medications   Prescriptions Last Dose Informant Patient Reported? Taking?   Alcohol Swabs PADS  Self No No   Sig: Use to swab the area of the injection or todd as directed Per insurance coverage   NOVOLOG FLEXPEN 100 UNIT/ML soln 3/11/2022  No Yes   Sig: Inject 5 Units Subcutaneous 3 times daily (with meals)   SENNA-docusate sodium (SENNA S) 8.6-50 MG tablet   No No   Sig: Take 1 tablet by mouth 2 times daily as needed (constipation)   STATIN NOT PRESCRIBED (INTENTIONAL)  Self No No   Sig: Please choose reason not prescribed, below   Sharps Container MISC  Self No No   Sig: Use as directed to dispose of needles, lancets and other sharps. Per Insurance coverage   bictegravir-emtricitabine-tenofovir (BIKTARVY) -25 MG per tablet 3/11/2022  No Yes   Sig: Take 1 tablet by mouth daily   blood glucose (NO BRAND SPECIFIED) test strip  Self No No   Sig: Use to test blood sugar 4 times daily or as directed. To accompany:whatever is covered   blood glucose calibration (NO BRAND SPECIFIED) solution  Self No No   Sig: Used to calibrate the blood glucose monitor as needed and as directed.  To accompany  blood glucose brands per insurance coverage   blood glucose monitoring (NO BRAND SPECIFIED) meter device kit  Self No No   Sig: Use as directed. Per insurance coverage.   glucose 40 % (400 mg/mL) gel  at prn Self No No   Sig: 15 g every 15 minutes by mouth as needed for low blood sugar. Oral gel is preferable for conscious and able to swallow patient.   ibuprofen (ADVIL/MOTRIN) 400 MG tablet  at prn  No Yes   Sig: Take 1 tablet (400 mg) by mouth every 4 hours as needed for moderate pain   insulin detemir (LEVEMIR PEN) 100 UNIT/ML pen 3/11/2022 Self No Yes   Sig: Inject 20 Units Subcutaneous 2 times daily   insulin pen needle (BD SCOTT U/F) 32G X 4 MM miscellaneous  Self No No   Sig: USE 3 TO 4 NEEDLES DAILY   ondansetron (ZOFRAN-ODT) 4 MG ODT tab  at prn  No Yes   Sig: Take 1 tablet (4 mg) by  "mouth every 6 hours as needed for nausea or vomiting   pantoprazole (PROTONIX) 40 MG EC tablet 3/11/2022 Self No Yes   Sig: Take 1 tablet (40 mg) by mouth daily   polyethylene glycol (MIRALAX) 17 GM/Dose powder   No No   Sig: Take 17 g by mouth 2 times daily   thin (NO BRAND SPECIFIED) lancets  Self No No   Sig: Use with lanceting device. To accompany: Test 4 times daily with whatever is covered      Facility-Administered Medications: None     Allergies   Allergies   Allergen Reactions     Metformin      Abdominal pain     Tylenol [Acetaminophen] Itching     Dulaglutide Rash     Insulin Lispro Rash     Patient reported     Penicillin V Other (See Comments) and Rash     Diffuse maculopapular rash + feels \"high\", per pt.        Physical Exam   Vital Signs: Temp: 96.9  F (36.1  C) Temp src: Oral BP: (!) 144/81 Pulse: 84   Resp: 18 SpO2: 98 % O2 Device: None (Room air)    Weight: 158 lbs 8.17 oz    Physical Exam  Constitutional:       Appearance: He is obese.   HENT:      Head: Normocephalic and atraumatic.   Cardiovascular:      Rate and Rhythm: Normal rate and regular rhythm.      Pulses: Normal pulses.      Heart sounds: Normal heart sounds.   Pulmonary:      Effort: Pulmonary effort is normal.      Breath sounds: Normal breath sounds.   Abdominal:      General: There is distension.      Palpations: There is no mass.      Tenderness: There is abdominal tenderness. There is no right CVA tenderness, left CVA tenderness or guarding.      Comments: Hypoactive bowel sounds. Resonance to percussion in the L epigastric region with the area of most tenderness. That area is more 'tough' but does not represent a discrete mass.    Musculoskeletal:         General: Deformity (toes and fingers are dry and occasionally cracked) present.      Right lower leg: No edema.      Left lower leg: No edema.   Skin:     General: Skin is warm and dry.   Neurological:      Mental Status: He is alert.   Psychiatric:         Mood and Affect: " Mood normal.         Thought Content: Thought content normal.      Comments: Near-constant movement, seemingly unrelated to pain symptoms, not purposeful or stereotypic       Data   Data reviewed today: I reviewed all medications, new labs and imaging results over the last 24 hours. I personally reviewed the abdominal x-ray image(s) showing fluid air loops of bowel consistent with small bowel obstruction .    Recent Labs   Lab 03/14/22  1802 03/14/22  1145   WBC  --  10.6   HGB  --  12.8*   MCV  --  87   PLT  --  335   NA  --  138   POTASSIUM  --  3.6   CHLORIDE  --  105   CO2  --  26   BUN  --  17   CR  --  0.70   ANIONGAP  --  7   LINDA  --  9.4   * 189*   ALBUMIN  --  3.5   PROTTOTAL  --  8.1   BILITOTAL  --  0.5   ALKPHOS  --  130   ALT  --  16   AST  --  16   LIPASE  --  134     Recent Results (from the past 24 hour(s))   XR Abdomen 2 Views    Narrative    XR ABDOMEN 2VIEWS  3/14/2022 9:57 AM      HISTORY: sbo    COMPARISON: 2/19/2022, 2/20/2022    FINDINGS: AP upright and supine views of the abdomen. There are a few  loops of dilated small bowel in the central abdomen with associated  air-fluid levels. No definite free air or pneumatosis. Small stomach  bubble. Lung bases are relatively clear.      Impression    IMPRESSION: Few dilated loops of small bowel in the central abdomen  with air-fluid levels, suggesting small bowel obstruction.     I have personally reviewed the examination and initial interpretation  and I agree with the findings.    CHARLIE BERTRAND MD         SYSTEM ID:  [id number]

## 2022-03-15 LAB
ALBUMIN SERPL-MCNC: 3 G/DL (ref 3.4–5)
ALP SERPL-CCNC: 101 U/L (ref 40–150)
ALT SERPL W P-5'-P-CCNC: 16 U/L (ref 0–70)
ANION GAP SERPL CALCULATED.3IONS-SCNC: 4 MMOL/L (ref 3–14)
AST SERPL W P-5'-P-CCNC: 12 U/L (ref 0–45)
BASOPHILS # BLD AUTO: 0 10E3/UL (ref 0–0.2)
BASOPHILS NFR BLD AUTO: 1 %
BILIRUB SERPL-MCNC: 0.5 MG/DL (ref 0.2–1.3)
BUN SERPL-MCNC: 15 MG/DL (ref 7–30)
CALCIUM SERPL-MCNC: 8.8 MG/DL (ref 8.5–10.1)
CHLORIDE BLD-SCNC: 106 MMOL/L (ref 94–109)
CO2 SERPL-SCNC: 27 MMOL/L (ref 20–32)
CREAT SERPL-MCNC: 0.62 MG/DL (ref 0.66–1.25)
EOSINOPHIL # BLD AUTO: 0.2 10E3/UL (ref 0–0.7)
EOSINOPHIL NFR BLD AUTO: 3 %
ERYTHROCYTE [DISTWIDTH] IN BLOOD BY AUTOMATED COUNT: 13.2 % (ref 10–15)
GFR SERPL CREATININE-BSD FRML MDRD: >90 ML/MIN/1.73M2
GLUCOSE BLD-MCNC: 144 MG/DL (ref 70–99)
GLUCOSE BLDC GLUCOMTR-MCNC: 128 MG/DL (ref 70–99)
GLUCOSE BLDC GLUCOMTR-MCNC: 131 MG/DL (ref 70–99)
GLUCOSE BLDC GLUCOMTR-MCNC: 139 MG/DL (ref 70–99)
GLUCOSE BLDC GLUCOMTR-MCNC: 171 MG/DL (ref 70–99)
GLUCOSE BLDC GLUCOMTR-MCNC: 330 MG/DL (ref 70–99)
HCT VFR BLD AUTO: 36.3 % (ref 40–53)
HGB BLD-MCNC: 11.8 G/DL (ref 13.3–17.7)
IMM GRANULOCYTES # BLD: 0 10E3/UL
IMM GRANULOCYTES NFR BLD: 1 %
LYMPHOCYTES # BLD AUTO: 2.3 10E3/UL (ref 0.8–5.3)
LYMPHOCYTES NFR BLD AUTO: 39 %
MCH RBC QN AUTO: 28 PG (ref 26.5–33)
MCHC RBC AUTO-ENTMCNC: 32.5 G/DL (ref 31.5–36.5)
MCV RBC AUTO: 86 FL (ref 78–100)
MONOCYTES # BLD AUTO: 0.4 10E3/UL (ref 0–1.3)
MONOCYTES NFR BLD AUTO: 7 %
NEUTROPHILS # BLD AUTO: 2.9 10E3/UL (ref 1.6–8.3)
NEUTROPHILS NFR BLD AUTO: 49 %
NRBC # BLD AUTO: 0 10E3/UL
NRBC BLD AUTO-RTO: 0 /100
PLATELET # BLD AUTO: 328 10E3/UL (ref 150–450)
POTASSIUM BLD-SCNC: 3.9 MMOL/L (ref 3.4–5.3)
PROT SERPL-MCNC: 6.8 G/DL (ref 6.8–8.8)
RBC # BLD AUTO: 4.21 10E6/UL (ref 4.4–5.9)
SODIUM SERPL-SCNC: 137 MMOL/L (ref 133–144)
WBC # BLD AUTO: 6 10E3/UL (ref 4–11)

## 2022-03-15 PROCEDURE — 99232 SBSQ HOSP IP/OBS MODERATE 35: CPT | Mod: GC | Performed by: INTERNAL MEDICINE

## 2022-03-15 PROCEDURE — 99254 IP/OBS CNSLTJ NEW/EST MOD 60: CPT | Mod: GC | Performed by: INTERNAL MEDICINE

## 2022-03-15 PROCEDURE — 99207 PR CDG-MDM COMPONENT: MEETS MODERATE - DOWN CODED: CPT | Performed by: INTERNAL MEDICINE

## 2022-03-15 PROCEDURE — 36415 COLL VENOUS BLD VENIPUNCTURE: CPT | Performed by: STUDENT IN AN ORGANIZED HEALTH CARE EDUCATION/TRAINING PROGRAM

## 2022-03-15 PROCEDURE — 85025 COMPLETE CBC W/AUTO DIFF WBC: CPT | Performed by: STUDENT IN AN ORGANIZED HEALTH CARE EDUCATION/TRAINING PROGRAM

## 2022-03-15 PROCEDURE — 82040 ASSAY OF SERUM ALBUMIN: CPT | Performed by: STUDENT IN AN ORGANIZED HEALTH CARE EDUCATION/TRAINING PROGRAM

## 2022-03-15 PROCEDURE — 80053 COMPREHEN METABOLIC PANEL: CPT | Performed by: STUDENT IN AN ORGANIZED HEALTH CARE EDUCATION/TRAINING PROGRAM

## 2022-03-15 PROCEDURE — 250N000013 HC RX MED GY IP 250 OP 250 PS 637: Performed by: STUDENT IN AN ORGANIZED HEALTH CARE EDUCATION/TRAINING PROGRAM

## 2022-03-15 PROCEDURE — 120N000002 HC R&B MED SURG/OB UMMC

## 2022-03-15 PROCEDURE — 258N000003 HC RX IP 258 OP 636: Performed by: EMERGENCY MEDICINE

## 2022-03-15 RX ORDER — POLYETHYLENE GLYCOL 3350 17 G/17G
17 POWDER, FOR SOLUTION ORAL 2 TIMES DAILY
Status: DISCONTINUED | OUTPATIENT
Start: 2022-03-15 | End: 2022-03-17 | Stop reason: HOSPADM

## 2022-03-15 RX ORDER — SIMETHICONE 80 MG
80 TABLET,CHEWABLE ORAL EVERY 6 HOURS PRN
Status: DISCONTINUED | OUTPATIENT
Start: 2022-03-15 | End: 2022-03-17 | Stop reason: HOSPADM

## 2022-03-15 RX ORDER — IBUPROFEN 600 MG/1
600 TABLET, FILM COATED ORAL EVERY 6 HOURS PRN
Status: DISCONTINUED | OUTPATIENT
Start: 2022-03-15 | End: 2022-03-17 | Stop reason: HOSPADM

## 2022-03-15 RX ORDER — CIPROFLOXACIN 500 MG/1
500 TABLET, FILM COATED ORAL EVERY 12 HOURS SCHEDULED
Status: DISCONTINUED | OUTPATIENT
Start: 2022-03-15 | End: 2022-03-17 | Stop reason: HOSPADM

## 2022-03-15 RX ADMIN — INSULIN DETEMIR 10 UNITS: 100 INJECTION, SOLUTION SUBCUTANEOUS at 20:06

## 2022-03-15 RX ADMIN — INSULIN DETEMIR 10 UNITS: 100 INJECTION, SOLUTION SUBCUTANEOUS at 08:00

## 2022-03-15 RX ADMIN — CIPROFLOXACIN 500 MG: 500 TABLET, COATED ORAL at 20:06

## 2022-03-15 RX ADMIN — SODIUM CHLORIDE: 9 INJECTION, SOLUTION INTRAVENOUS at 17:59

## 2022-03-15 RX ADMIN — BICTEGRAVIR SODIUM, EMTRICITABINE, AND TENOFOVIR ALAFENAMIDE FUMARATE 1 TABLET: 50; 200; 25 TABLET ORAL at 07:59

## 2022-03-15 RX ADMIN — POLYETHYLENE GLYCOL 3350 17 G: 17 POWDER, FOR SOLUTION ORAL at 20:05

## 2022-03-15 ASSESSMENT — ACTIVITIES OF DAILY LIVING (ADL)
ADLS_ACUITY_SCORE: 5
DEPENDENT_IADLS:: INDEPENDENT
ADLS_ACUITY_SCORE: 5

## 2022-03-15 NOTE — PROGRESS NOTES
"/77 (BP Location: Left arm)   Pulse 88   Temp 98.8  F (37.1  C) (Oral)   Resp 18   Ht 1.626 m (5' 4\")   Wt 71.9 kg (158 lb 8.2 oz)   SpO2 97%   BMI 27.21 kg/m       Neuro: A&Ox4.   Cardiac: Afebrile, VSS.   Respiratory: RA   GI/: Voiding spontaneously.BM this shift.   Diet/appetite: NPO ice chips. Denies nausea   Activity:Independent  Pain: Denies   Skin: No new deficits noted.  Lines:PIV  Drains:None  No new complaints.Will continue to monitor and follow plan of care.         "

## 2022-03-15 NOTE — PLAN OF CARE
"[id number]    Nursing Focus: Admission  D: Arrived at 1500 from ED via patient transport. Patient arrived alone. Admitted for SBO. Complains of 10/10 left abdominal pain.      I: Admission process began.  Patient oriented to room, enviroment, call light.  Md. notified of patients arrival on unit.     A: Vital signs stable, afebrile.  Patient stable at this time.  Complaining of abdominal pain and nausea.     P: Implement plan of care when available. Continue to monitor patient. Nursing interventions as appropriate. Notify md with changes in pt status.      Hypertensive, within parameters. OVSS, afebrile and on RA. NPO due to SBO. Denies SOB. Intermittent nausea, had emesis x1, gave IV zofran with some effectiveness. Complained of 10/10 left abdominal pain, pgd provider and received orders for PRN 0.3 mg q3 hrs. Gave x1 with some relief. Around 2030, pt requested order for NG tube as he \"couldn't take it any more.\" Pgd provider for orders, however once returning to room later, pt again refused NG tube as he \"passed a lot of gas\" and was feeling better and didn't want it any more. Pt complained of heartburn following emesis, pgd provider and received PRN GI cocktail, gave x1. Q4 BS were 232 and 190, covered with sliding scale. Pt voiding spontaneously without difficult, LBM 3/12 per pt. Up independently in room and showered this evening. Continue to monitor & w/ POC.   "

## 2022-03-15 NOTE — PLAN OF CARE
"[id number]:      /73 (BP Location: Left arm)   Pulse 82   Temp 98.9  F (37.2  C) (Oral)   Resp 18   Ht 1.626 m (5' 4\")   Wt 71.9 kg (158 lb 8.2 oz)   SpO2 99%   BMI 27.21 kg/m        NEURO: Intact, A&O x4.      RESPIRATORY:  Pt denies having any SOB and no cough noted.     CARDIO: WDL.      GI/:  Pt voiding spontaneously with AUOP. Last BM: 3/12/22. Pt denies having any N/V. Continues to be NPO.      SKIN: WDL.      ACTIVITY: Independent and up ad lexi.      PAIN: Pt continues to have intermittent abdominal pain. No PRN pain medications requested/given this shift.      LDA: PIV (left) - saline locked.      BG: Blood glucose @ 0200 = 171 (1 unit sliding scale insulin coverage given); Blood glucose @ 0600 = 139 (no sliding scale insulin coverage required).            "

## 2022-03-15 NOTE — PROGRESS NOTES
[facility]    Progress Note - Medicine Service, JORGE TEAM 1       Date of Admission:  3/14/2022    Assessment & Plan            Andrew Lockwood is a 54 year old male admitted on 3/14/2022. He has a history of recurrent SBO, T2DM (A1c 11.2% 02/18/22), HIV/AIDS previously c/b CNS toxoplasmosis, and GERD with prior and is admitted for abdominal pain c/f recurrence of SBO, now demonstrating clinical improvement.    UPDATES:  - Seen by GI, recommend decontamination with ciprofloxacin  - Clear liquid diet, advancing as tolerated  - Anticipate able to discharge tomorrow    Abdominal pain  Small bowel obstruction, partial, recurrence; improved  Recurrent SBO with multiple ED presentations, especially in the past 2-3 months. Prior appendectomy, ex lap with lysis of adhesions (2016, 2018). Last small bowel enteroscopy and colonoscopy (01/2016) without findings on exam or pathology to explain recurrences. Current Lipase, LFTs, WBC wnl. Given narrative of recurrent episodes of pain and concurrent diarrhea, presentations may reflect chronic constipation with encopresis, no PTA bowel regimen.   - CLD ADAT  - Pain control: (1) Ibuprofen   - GI consulted, signed off:   - Trial of antibiotics therapy for SIBO ( Consider Cipro 500mg BID X14 days)   - No endoscopic intervention at this time   - Continue conservative management     - Please consider consulting surgery for their input   - Miralax BID scheduled, for concerns r/t chronic constipation   - Simethicone and GI cocktail PRN  - Has referrals in place from previous admissions for outpatient GI clinic follow-up     T2DM  He describes difficulties with ensuring access to glucose monitoring supplies.   - 1/2-dose LA insulin while NPO  - Medium-dose sliding scale insulin  - Consideration for SGLT2i while inpatient after tolerating diet      Housing Insecurity  Presents from shelter, declined SW  "assistance     =================  CHRONIC/RESOLVED  =================  GERD - PTA pantoprazole  HIV w/ Hx CNS toxoplasmosis - Continue PTA biktarvy. VL 0, CD4 500+ (08/2021).         Diet: Advance Diet as Tolerated: Clear Liquid Diet    DVT Prophylaxis: Low Risk/Ambulatory with no VTE prophylaxis indicated  Potts Catheter: Not present  Fluids: PO Ad lexi  Central Lines: None  Cardiac Monitoring: None  Code Status: Full Code      Disposition Plan   Expected Discharge: 03/18/2022     Anticipated discharge location:  Awaiting care coordination huddle  Delays:          The patient's care was discussed with the Attending Physician, Dr. Nirmal Quevedo.    Osbaldo Raines MD, PhD  PGY-2 Internal Medcine  [phone] [text page]    Medicine Service, [facility] TEAM [address] [place] [address]  Securely message with the Vocera Web Console (learn more here)  Text page via [facility] Paging/Directory   Please see signed in provider for up to date coverage information      Clinically Significant Risk Factors Present on Admission             # Overweight: Estimated body mass index is 27.21 kg/m  as calculated from the following:    Height as of this encounter: 1.626 m (5' 4\").    Weight as of this encounter: 71.9 kg (158 lb 8.2 oz).      ______________________________________________________________________    Interval History   1x episode emesis overnight, asked for NGT but then changed his mind after he passed gas and had a bowel movement (diarrhea), which helped greatly. Pain is much better today. No fever, N/V, SOB, CP.     Data reviewed today: I reviewed all medications, new labs and imaging results over the last 24 hours. I personally reviewed no images or EKG's today.    Physical Exam   Vital Signs: Temp: 97.8  F (36.6  C) Temp src: Oral BP: 106/72 Pulse: 75   Resp: 18 SpO2: 95 % O2 Device: None (Room air)    Weight: 158 lbs 8.17 oz  Physical Exam  Constitutional:       Appearance: He is obese. "   HENT:      Head: Normocephalic and atraumatic.   Cardiovascular:      Rate and Rhythm: Normal rate and regular rhythm.      Pulses: Normal pulses.      Heart sounds: Normal heart sounds.   Pulmonary:      Effort: Pulmonary effort is normal.      Breath sounds: Normal breath sounds.   Abdominal:      General: There is no distension.      Palpations: There is no mass.      Tenderness: There is no abdominal tenderness. There is no right CVA tenderness, left CVA tenderness or guarding.      Comments: NABS. Improved resonance to percussion in the L epigastric region with the area of most tenderness. That area is more 'tough' but does not represent a discrete mass.    Musculoskeletal:      Right lower leg: No edema.      Left lower leg: No edema.   Skin:     General: Skin is warm and dry.   Neurological:      Mental Status: He is alert.   Psychiatric:         Mood and Affect: Mood normal.         Thought Content: Thought content normal.      Comments: Near-constant movement, seemingly unrelated to pain symptoms, not purposeful or stereotypic       Data   Recent Labs   Lab 03/15/22  1549 03/15/22  1157 03/15/22  0545 03/15/22  0533 03/14/22  1802 03/14/22  1145   WBC  --   --   --  6.0  --  10.6   HGB  --   --   --  11.8*  --  12.8*   MCV  --   --   --  86  --  87   PLT  --   --   --  328  --  335   NA  --   --   --  137  --  138   POTASSIUM  --   --   --  3.9  --  3.6   CHLORIDE  --   --   --  106  --  105   CO2  --   --   --  27  --  26   BUN  --   --   --  15  --  17   CR  --   --   --  0.62*  --  0.70   ANIONGAP  --   --   --  4  --  7   LINDA  --   --   --  8.8  --  9.4   * 128* 139* 144*   < > 189*   ALBUMIN  --   --   --  3.0*  --  3.5   PROTTOTAL  --   --   --  6.8  --  8.1   BILITOTAL  --   --   --  0.5  --  0.5   ALKPHOS  --   --   --  101  --  130   ALT  --   --   --  16  --  16   AST  --   --   --  12  --  16   LIPASE  --   --   --   --   --  134    < > = values in this interval not displayed.

## 2022-03-15 NOTE — DISCHARGE SUMMARY
[facility]  Discharge Summary - Medicine & Pediatrics       Date of Admission:  3/14/2022  Date of Discharge:  3/17/2022  5:00 PM  Discharging Provider: Osbaldo Raines  Discharge Service: Medicine ServiceJORGE TEAM 1    Discharge Diagnoses   Small bowel obstruction, recurrent  Type 2 diabetes mellitus, poorly controlled    Gastroesophageal reflux disease  Human immunodeficiency virus infection, longstanding on antiretroviral therapy    Follow-ups Needed After Discharge     PCP  New restart on metformin and empagliflozin; both $0 copay  Endocrine New medications for DM control, decreasing insulin utilization   Inf Disease  Advised to attend his upcoming clinic appointment  GI  Pt has unscheduled general follow-up for SBO and possible SIBO     No unresulted labs or tests.     Discharge Disposition   Discharged to home  Condition at discharge: Stable    Hospital Course   Andrew Lockwood was admitted on 3/14/2022 for recurrent abdominal pain concerning for SBO recurrence.  The following problems were addressed during his hospitalization:    Mr. Lockwood arrived to the hospital with a day's history of severe abdominal pain, nausea/emesis, and obstipation. He had presented multiple times since 2016 with similar symptoms, with no clear etiology uncovered thus far.     Small bowel obstruction, partial, recurrence; improved  Recurrent SBO with multiple ED presentations, especially in the past 2-3 months. Prior appendectomy, ex lap with lysis of adhesions (2016, 2018). Last small bowel enteroscopy and colonoscopy (01/2016) without findings on exam or pathology to explain recurrences. Lipase, LFTs, WBC wnl on presentation. Given narrative of recurrent episodes of pain and concurrent diarrhea, differential includes small intestine bacterial overgrowth, chronic constipation with encopresis (and food insecurity with large intermittent meals), resultant motility disorder perhaps from  "prior HIV-associated enteritis. Pt refused NGT for decompression. Pain resolved after he was kept NPO aside from medications and miralax, which promoted a bowel movement on HD#1 and subsequent improvement in symptoms. Ciprofloxacin started before discharge on HD#2.   - GI consulted, signed off:              - Trial of antibiotics therapy for SIBO (Cipro 500mg BID X14 days)              - No endoscopic intervention at this time              - Continue conservative management     - Outpatient GI follow-up from prior admission, pt needs to schedule  - START Miralax BID scheduled, for concerns r/t chronic constipation   - START Simethicone PRN     T2DM, poorly controlled  He describes difficulties with ensuring access to glucose monitoring supplies. BG control made more difficult inpatient with high-sugar popsicles offered in lieu of ice chips while being kept \"NPO\".   - Return to PTA insulin regimen   - START metformin XR 500mg once daily; can be increased as tolerated in outpatient   -- Pt has chronic abdominal pain, unclear if prior attempts to start metformin were actually indicative of true allergy; also, never started on XR formulation in past  - START jardiance 25mg once daily; similar to metformin he has chronic abd pain and may have not tolerated this medication in the past related to chronic pains, not true allergy  - Will need DM followup management in the near future with PCP and Endocrinology    GERD - continues pantoprazole  HIV - continues biktarvy  Housing Insecurity - Presents from shelter, declined SW assistance    Consultations This Hospital Stay   VASCULAR ACCESS CARE ADULT IP CONSULT  GI LUMINAL ADULT IP CONSULT  VASCULAR ACCESS CARE ADULT IP CONSULT  CARE MANAGEMENT / SOCIAL WORK IP CONSULT    Code Status   Full Code     The patient was discussed with Dr. Quevedo and the patient.     Osbaldo \"BJ\" MD Olu, PhD  PGY-2 Internal Medcine  [phone] [text page]    [facility] " [facility] [address]  [address] [place] [address]  Phone: [phone]  ______________________________________________________________________    Physical Exam   Vital Signs: Temp: 98.8  F (37.1  C) Temp src: Oral BP: 115/60 Pulse: 88   Resp: 16 SpO2: 98 % O2 Device: None (Room air)    Weight: 157 lbs 8 oz  Physical Exam  Constitutional:       General: He is not in acute distress.     Appearance: He is obese.   HENT:      Head: Normocephalic and atraumatic.   Cardiovascular:      Rate and Rhythm: Normal rate and regular rhythm.      Pulses: Normal pulses.      Heart sounds: Normal heart sounds.   Pulmonary:      Effort: Pulmonary effort is normal.      Breath sounds: Normal breath sounds.   Abdominal:      General: There is no distension.      Palpations: There is no mass.      Tenderness: There is no abdominal tenderness. There is no right CVA tenderness, left CVA tenderness or guarding.      Comments: NABS. No tenderness.    Musculoskeletal:      Right lower leg: No edema.      Left lower leg: No edema.   Skin:     General: Skin is warm and dry.   Neurological:      Mental Status: He is alert.   Psychiatric:         Mood and Affect: Mood normal.         Thought Content: Thought content normal.      Comments: Near-constant movement, seemingly unrelated to pain symptoms, not purposeful or stereotypic         Primary Care Physician   Ayala Prieto    Discharge Orders      Reason for your hospital stay    You were seen for concerns related to abdominal pain. We are encouraged that you have resolved an obstruction in your intestines. I am prescribing you some medications to help with your abdominal pain.     - Miralax: take 2 times daily to help make stools soft so you don't become constipated  - Ciprofloxacin: an antibiotic, to be taken 1 pill 2 times daily   - Simethicone: a medication to help with bloating and gas, can be taken 1 tablet at most every 6 hours     Activity    Your activity upon  discharge: activity as tolerated     Adult [facility] Follow-up and recommended labs and tests    Follow-up with Gastroenterology Clinic, Diabetes Clinic.     Appointments on [facility] and/or [facility] (with [facility] or [facility] provider or service). Call [phone] if you haven't heard regarding these appointments within 7 days of discharge.     Diet    Follow this diet upon discharge: Orders Placed This Encounter      Moderate Consistent Carb (60 g CHO per Meal) Diet       Significant Results and Procedures   Most Recent 3 CBC's:Recent Labs   Lab Test 03/17/22  0617 03/15/22  0533 03/14/22  1145   WBC 6.0 6.0 10.6   HGB 11.9* 11.8* 12.8*   MCV 85 86 87    328 335     Most Recent 3 BMP's:Recent Labs   Lab Test 03/17/22  1714 03/17/22  1023 03/17/22  0845 03/17/22  0653 03/17/22  0617 03/15/22  0545 03/15/22  0533 03/14/22  1802 03/14/22  1145   NA  --   --   --   --  137  --  137  --  138   POTASSIUM  --   --   --   --  3.4  --  3.9  --  3.6   CHLORIDE  --   --   --   --  104  --  106  --  105   CO2  --   --   --   --  28  --  27  --  26   BUN  --   --   --   --  10  --  15  --  17   CR  --   --   --   --  0.61*  --  0.62*  --  0.70   ANIONGAP  --   --   --   --  5  --  4  --  7   LINDA  --   --   --   --  9.2  --  8.8  --  9.4   * 227* 124*   < > 140*   < > 144*   < > 189*    < > = values in this interval not displayed.   ,   Results for orders placed or performed during the hospital encounter of 03/14/22   XR Abdomen 2 Views    Narrative    XR ABDOMEN 2VIEWS  3/14/2022 9:57 AM      HISTORY: sbo    COMPARISON: 2/19/2022, 2/20/2022    FINDINGS: AP upright and supine views of the abdomen. There are a few  loops of dilated small bowel in the central abdomen with associated  air-fluid levels. No definite free air or pneumatosis. Small stomach  bubble. Lung bases are relatively clear.      Impression    IMPRESSION: Few dilated loops of small bowel in the central abdomen  with air-fluid levels, suggesting  small bowel obstruction.     I have personally reviewed the examination and initial interpretation  and I agree with the findings.    CHARLIE BERTRAND MD         SYSTEM ID:  [id number]     *Note: Due to a large number of results and/or encounters for the requested time period, some results have not been displayed. A complete set of results can be found in Results Review.     Discharge Medications   Discharge Medication List as of 3/17/2022  5:29 PM      START taking these medications    Details   ciprofloxacin (CIPRO) 500 MG tablet Take 1 tablet (500 mg) by mouth 2 times daily, Disp-26 tablet, R-0, E-Prescribe      simethicone (MYLICON) 80 MG chewable tablet Take 1 tablet (80 mg) by mouth every 6 hours as needed for cramping, Disp-90 tablet, R-0, E-Prescribe      empagliflozin (JARDIANCE) 25 MG TABS tablet Take 1 tablet (25 mg) by mouth daily, Disp-90 tablet, R-0, No Print OutThis is a test script - please text  or call [email or web address] / [id number] after running      metFORMIN (GLUCOPHAGE-XR) 500 MG 24 hr tablet Take 1 tablet (500 mg) by mouth daily (with dinner), Disp-90 tablet, R-0, No Print OutThis is a test script - please text  or call [email or web address] / [id number] after running         CONTINUE these medications which have CHANGED    Details   polyethylene glycol (MIRALAX) 17 GM/Dose powder Take 17 g by mouth daily, Disp-510 g, R-3, E-Prescribe         CONTINUE these medications which have NOT CHANGED    Details   Alcohol Swabs PADS Use to swab the area of the injection or todd as directed Per insurance coverage, Disp-100 each, R-0, E-Prescribe      bictegravir-emtricitabine-tenofovir (BIKTARVY) -25 MG per tablet Take 1 tablet by mouth daily, Disp-90 tablet, R-0, E-Prescribe      blood glucose (NO BRAND SPECIFIED) test strip Use to test blood sugar 4 times daily or as directed. To accompany:whatever is covered, Disp-360 strip, R-3, E-Prescribe      blood glucose calibration (NO BRAND SPECIFIED)  solution Used to calibrate the blood glucose monitor as needed and as directed.  To accompany  blood glucose brands per insurance coverage, Disp-1 each, R-0, E-Prescribe      blood glucose monitoring (NO BRAND SPECIFIED) meter device kit Use as directed. Per insurance coverage.Disp-1 kit, [id number]      glucose 40 % (400 mg/mL) gel 15 g every 15 minutes by mouth as needed for low blood sugar. Oral gel is preferable for conscious and able to swallow patient.Disp-112.5 g, [id number]      ibuprofen (ADVIL/MOTRIN) 400 MG tablet Take 1 tablet (400 mg) by mouth every 4 hours as needed for moderate pain, Disp-60 tablet, R-0, E-Prescribe      insulin detemir (LEVEMIR PEN) 100 UNIT/ML pen Inject 20 Units Subcutaneous 2 times daily, Disp-15 mL, R-0, E-Prescribe      insulin pen needle (BD SCOTT U/F) 32G X 4 MM miscellaneous USE 3 TO 4 NEEDLES DAILYDisp-400 each, [id number]      NOVOLOG FLEXPEN 100 UNIT/ML soln Inject 5 Units Subcutaneous 3 times daily (with meals), Disp-75 mL, R-0, OLESYA, E-Prescribe      ondansetron (ZOFRAN-ODT) 4 MG ODT tab Take 1 tablet (4 mg) by mouth every 6 hours as needed for nausea or vomiting, Disp-15 tablet, R-0, E-Prescribe      pantoprazole (PROTONIX) 40 MG EC tablet Take 1 tablet (40 mg) by mouth daily, Disp-30 tablet, R-6, E-Prescribe      STATIN NOT PRESCRIBED (INTENTIONAL) Reason Statin was Not Prescribed: OTHER - Enter reason is comments section (This option does NOTexclude patient from measure) / noted in chart that is recommended multiple timesNo Print Out      thin (NO BRAND SPECIFIED) lancets Use with lanceting device. To accompany: Test 4 times daily with whatever is covered, Disp-360 each, R-3, E-Prescribe      SENNA-docusate sodium (SENNA S) 8.6-50 MG tablet Take 1 tablet by mouth 2 times daily as needed (constipation), Disp-30 tablet, R-0, E-Prescribe      Sharps Container MISC Use as directed to dispose of needles, lancets and other sharps. Per Insurance coverage, Disp-1  "each, R-0, E-Prescribe           Allergies   Allergies   Allergen Reactions     Metformin      Abdominal pain     Tylenol [Acetaminophen] Itching     Dulaglutide Rash     Insulin Lispro Rash     Patient reported     Penicillin V Other (See Comments) and Rash     Diffuse maculopapular rash + feels \"high\", per pt.      "

## 2022-03-15 NOTE — CONSULTS
GASTROENTEROLOGY CONSULTATION      Date of Admission:  3/14/2022          ASSESSMENT AND RECOMMENDATIONS:     54 year old male with a history of recurrent SBO, T2DM (A1c 11.2% 02/18/22), HIV/AIDS previously c/b CNS toxoplasmosis, and GERD who presents with abdominal pain and is being admitted for management of SBO.       Recurrent  Partial SBO (~Mid jejunum)   ~Patient with multiple ED admissions for recurrent SBO which has been ongoing for many years but has had multiple admissions especially in the last few months. Prior appendectomy, ex lap with lysis of adhesions (2016, 2018)  ~He presents with abdominal pain, AXR revealed few dilated loops of small bowel in the central abdomen with air-fluid levels, suggesting small bowel obstruction.   ~ He has had several work up over the years including small bowel enteroscopy and colonoscopy in 2016 which were both unrevealing, several cross sectional imaging and MRE which have mostly been normal.   ~GI evaluated the patient in January and at that time thought the Most plausibly his chronic obstructive episodes are 2/2 to a past HIV related enteritis (histoplasmosis or otherwise) that has resolved and left him with multiple segment of SB stricturing. Surgery did not find extensive adhesions of dx laparoscopy in 2018. MR enterography in 2019 similarly did not show active enteritis. No evidence to suggest lymphoma, infection, Whipple's or extremely poor motility as culprit.  ~Upon evaluation during this admission, we agree with the last GI evaluation as described above. We favor continued medical and conservative management and not proceeding with an endoscopic intervention at this time. Treatment of SIBO  had been recommended in the past and patient does not seem to have received a trial of antibiotics therapy for SIBO.   ~Pt has had a BM and states he is feeling improved today.     RECOMMENDATIONS  -Recommend a trial of antibiotics therapy for SIBO ( Consider Cipro  500mg BID X14 days)  -No endoscopic intervention at this time  -Continue conservative management    -Please consider consulting surgery for their input       GI will sign off, Thank you for involving us in this patient's care. Please do not hesitate to contact the GI service with any questions or concerns.     Patient care plan discussed with Dr. Alan, GI staff physician.    Av Martinez MD  Gastroenterology Fellow  Pager             Chief Complaint:   We were asked by Osbaldo Raines MD, PhD of [facility] team 1 to evaluate this patient with recurrent SBO    History is obtained from the patient and the medical record.          History of Present Illness:   Andrew Lockwood is a 54 year old male with a history of recurrent SBO, T2DM (A1c 11.2% 02/18/22), HIV/AIDS previously c/b CNS toxoplasmosis, and GERD who presents with abdominal pain in the setting of recurrent SBO and previous presentation of similar abdominal pain.  He has had similar episodes in the past for which he has been admitted for partial SBOs. He states his symptoms are typically worsened by food. He reporting eating more of carbohydrates and meat. He states he most time eats what's available to him because he is mostly homeless. He reports associated nausea, vomiting and heartburn symptoms. He also reports mostly non bloody brown loose stools.     He states this has been ongoing for many years and he has had several evaluations done. He has been admitted several time in the last few times with similar presentation. Given recurrence of SBO and recent multiple admissions GI was consulted for further evaluation.             Past Medical History:   Reviewed and edited as appropriate  Past Medical History:   Diagnosis Date     AIDS (H)      Allergic rhinitis due to other allergen     DNS     Anal dysplasia      Chronic abdominal pain      CNS toxoplasmosis (H)      Diabetes type 2, controlled (H)      GERD (gastroesophageal reflux disease)       History of seizure      History of substance abuse (H)      HIV (human immunodeficiency virus infection) (H)      HLD (hyperlipidemia)      Lung nodules      Periungual wart      PTSD (post-traumatic stress disorder)      Sleep apnea     doesn't use CPAP            Past Surgical History:   Reviewed and edited as appropriate   Past Surgical History:   Procedure Laterality Date     ANOSCOPY N/A 9/9/2020    Procedure: Exam Under Anesthesia, ANOSCOPY, fulgeration of rectal fissures with Rectal Biopsies;  Surgeon: Thanh Lundberg MD;  Location: [facility] OR     COLONOSCOPY Left 1/22/2016    Procedure: COMBINED COLONOSCOPY, SINGLE OR MULTIPLE BIOPSY/POLYPECTOMY BY BIOPSY;  Surgeon: Clark Saini MD;  Location: [facility] GI     HC EXPLORE UNDESC TESTIS,INGUIN/SCROTAL       LAPAROSCOPIC APPENDECTOMY N/A 1/31/2018    Procedure: LAPAROSCOPIC APPENDECTOMY;  LAPAROSCOPIC APPENDECTOMY;  Surgeon: Dawn Holt MD;  Location: [facility] OR     LAPAROSCOPY DIAGNOSTIC (GENERAL) N/A 7/26/2016    Procedure: LAPAROSCOPY DIAGNOSTIC (GENERAL);  Surgeon: Susannah Arriaga MD;  Location: [facility] OR     LAPAROSCOPY DIAGNOSTIC (GENERAL) N/A 4/16/2018    Procedure: LAPAROSCOPY DIAGNOSTIC (GENERAL);  Diagnostic laparoscopy and lysis of adhesions;  Surgeon: Prince Dowling MD;  Location: [facility] OR     OPTICAL TRACKING SYSTEM CRANIOTOMY, EXCISE TUMOR, COMBINED Left 4/10/2015    Procedure: COMBINED OPTICAL TRACKING SYSTEM CRANIOTOMY, EXCISE TUMOR;  Surgeon: Mirlande Colmenares MD;  Location: [facility] OR     REPAIR GAMEKEEPER'S THUMB Right 12/2/2016    Procedure: REPAIR LIGAMENT ULNAR COLLATERAL THUMB (GAMEKEEPER'S);  Surgeon: Evin Zamorano MD;  Location: [facility] OR     ZC NONSPECIFIC PROCEDURE      right forearm fracture            Previous Endoscopy:   No results found. However, due to the size of the patient record, not all encounters were searched. Please check Results Review for a complete set of results.         Social History:   Reviewed and  edited as appropriate  Social History     Socioeconomic History     Marital status: Single     Spouse name: Not on file     Number of children: Not on file     Years of education: Not on file     Highest education level: Not on file   Occupational History     Occupation:      Comment: 5 years; temp agency     Occupation: Unemployed   Tobacco Use     Smoking status: Current Every Day Smoker     Packs/day: 0.25     Years: 40.00     Pack years: 10.00     Types: Cigarettes     Smokeless tobacco: Former User   Substance and Sexual Activity     Alcohol use: No     Alcohol/week: 0.0 standard drinks     Comment: Last etoh in 2007     Drug use: Not Currently     Types: Marijuana, Methamphetamines     Sexual activity: Not Currently     Partners: Female, Male     Comment: Last sexual activity 2008   Other Topics Concern     Parent/sibling w/ CABG, MI or angioplasty before 65F 55M? Not Asked   Social History Narrative    Born in [facility] [place] [facility].  Came [place] the [place] in 1993.  Last traveled to visit family in 2008.        1/7/2019 - Homeless. Working with a  at [employer]  trying to get housing. Sexually active with two partners recently using condoms 100% of the time. Smokes occasionally, not for the last 4 weeks.        1/7/2020 at [facility] since 1/1/2020. 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  Care established with ID and endocrine     Social Determinants of Health     Financial Resource Strain: Not on file   Food Insecurity: Not on file   Transportation Needs: Not on file   Physical Activity: Not on file   Stress: Not on file   Social Connections: Not on file   Intimate Partner Violence: Not on file   Housing Stability: Not on file            Family History:   Reviewed and edited as appropriate  No known history of gastrointestinal/liver disease or  gastrointestinal malignancies       Allergies:   Reviewed and edited as appropriate     Allergies   Allergen Reactions     Metformin      Abdominal pain     Tylenol  "[Acetaminophen] Itching     Dulaglutide Rash     Insulin Lispro Rash     Patient reported     Penicillin V Other (See Comments) and Rash     Diffuse maculopapular rash + feels \"high\", per pt.             Review of Systems:     A complete review of systems was performed and is negative except as noted in the HPI           Physical Exam:   /75 (BP Location: Left arm)   Pulse 84   Temp 98.7  F (37.1  C) (Oral)   Resp 16   Ht 1.626 m (5' 4\")   Wt 71.9 kg (158 lb 8.2 oz)   SpO2 98%   BMI 27.21 kg/m    Wt:   Wt Readings from Last 2 Encounters:   03/14/22 71.9 kg (158 lb 8.2 oz)   02/18/22 70.3 kg (155 lb)      Constitutional: cooperative, not dyspneic/diaphoretic, no acute distress  Eyes: Sclera anicteric/injected  Ears/nose/mouth/throat: Mucus membranes moist  Neck: supple  CV:  No edema  Respiratory: Unlabored breathing  Abdomen:Nondistended, +bs, no hepatosplenomegaly, nontender, no peritoneal signs  Skin: warm, perfused, no jaundice  Neuro: AAO x 3, No asterixis  Psych: Normal affect         Data:   Labs and imaging below were independently reviewed and interpreted    BMP  Recent Labs   Lab 03/15/22  0545 03/15/22  0533 03/15/22  0200 03/14/22  2203 03/14/22  1802 03/14/22  1145   NA  --  137  --   --   --  138   POTASSIUM  --  3.9  --   --   --  3.6   CHLORIDE  --  106  --   --   --  105   LINDA  --  8.8  --   --   --  9.4   CO2  --  27  --   --   --  26   BUN  --  15  --   --   --  17   CR  --  0.62*  --   --   --  0.70   * 144* 171* 190*   < > 189*    < > = values in this interval not displayed.     CBC  Recent Labs   Lab 03/15/22  0533 03/14/22  1145   WBC 6.0 10.6   RBC 4.21* 4.60   HGB 11.8* 12.8*   HCT 36.3* 39.9*   MCV 86 87   MCH 28.0 27.8   MCHC 32.5 32.1   RDW 13.2 13.3    335     INRNo lab results found in last 7 days.  LFTs  Recent Labs   Lab 03/15/22  0533 03/14/22  1145   ALKPHOS 101 130   AST 12 16   ALT 16 16   BILITOTAL 0.5 0.5   PROTTOTAL 6.8 8.1   ALBUMIN 3.0* 3.5    "   PANC  Recent Labs   Lab 03/14/22  1145   LIPASE 134       Imaging:    AXR    IMPRESSION: Few dilated loops of small bowel in the central abdomen  with air-fluid levels, suggesting small bowel obstruction.

## 2022-03-15 NOTE — CONSULTS
Care Management Initial Consult    General Information  Assessment completed with: Patient, Care Team Member, chart review  Type of CM/SW Visit: Initial Assessment  Primary Care Provider verified and updated as needed: Yes   Readmission within the last 30 days: previous discharge plan unsuccessful   Return Category: Exacerbation of disease    Reason for Consult: housing concerns/homeless  Advance Care Planning Reviewed: no concerns identified        Communication Assessment  Patient's communication style: spoken language (English or Bilingual)    Hearing Difficulty or Deaf: no   Wear Glasses or Blind: no    Cognitive  Cognitive/Neuro/Behavioral: WDL                      Living Environment:   People in home: alone     Current living Arrangements: homeless - currently residing at [facility]  Able to return to prior arrangements: yes - patient would prefer to discharge to an alternate shelter but states he feels comfortable returning to previous living arrangements.     Family/Social Support:  Care provided by: self  Provides care for: no one  Marital Status: Single  Support System: None          Description of Support System: Uninvolved    Support Assessment: Lacks adequate physical care, Lacks adequate emotional support, Minimal outside structure and leisure time activities    Current Resources:   Patient receiving home care services: No  Community Resources: [facility] Worker; HIV Counseling - Simon Cadena CM; [place] [facility] [phone]  Equipment currently used at home: none  Supplies currently used at home: None    Employment/Financial:  Employment Status: unemployed     Financial Concerns: Unable to afford housing   Referral to Financial Worker: No       Lifestyle & Psychosocial Needs:  Social Determinants of Health     Tobacco Use: High Risk     Smoking Tobacco Use: Current Every Day Smoker     Smokeless Tobacco Use: Former User   Alcohol Use: Unknown     Frequency of Alcohol  "Consumption: Not on file     Average Number of Drinks: Patient refused     Frequency of Binge Drinking: Not on file   Financial Resource Strain: Not on file   Food Insecurity: Not on file   Transportation Needs: Not on file   Physical Activity: Not on file   Stress: Not on file   Social Connections: Not on file   Intimate Partner Violence: Not on file   Depression: Not at risk     PHQ-2 Score: 0   Housing Stability: Not on file       Functional Status:  Prior to admission patient needed assistance:   Dependent ADLs:: Independent  Dependent IADLs:: Independent  Assesssment of Functional Status: At functional baseline    Mental Health Status:  Mental Health Status: No Current Concerns       Chemical Dependency Status:  Chemical Dependency Status: No Current Concerns           Values/Beliefs:  Spiritual, Cultural Beliefs, Taoist Practices, Values that affect care: no               Additional Information:    Patient is a 54 year old male admitted on 3/14/2022. He has a history of recurrent SBO, T2DM (A1c 11.2% 02/18/22), HIV/AIDS previously c/b CNS toxoplasmosis, and GERD with prior and is admitted for abdominal pain c/f recurrence of SBO.    Care Management/ consulted due to elevated readmission risk score and housing instability.      met with patient in his hospital room; introduced self and explained role. Reviewed patient's currently living arrangements; patient confirmed that he is residing at [facility]. Patient stated that he would prefer not to still be living there due to \"all the drugs and fighting\" but confirms that he feels safe as he has his own room with a door at the shelter.     Patient asked if this writer could finding him housing.  Writer inquired if patient had been working with Social Workers at the shelter; patient confirmed stating, \"I have but they know nothing.\" This writer explained that they would have more knowledge and resources than this writer as " that was part of their role at the shelter. Writer also offered to provide the Adult Shelter Connect number in order for patient to find an alternate shelter or connect with his county ; patient declined stating that the shelters he has been to previously were worse options than [facility].      again inquired if patient felt safe to return to [facility]; patient confirmed. Patient reports no further questions or concerns reported at this time. RNCC/ will continue to follow and assist with any discharge planning needs as they arise.     JOMAR Glass  7D/5C Hematology/Oncology Social Worker  Phone: [phone]  Pager: [phone]  [email or web address]

## 2022-03-15 NOTE — PROVIDER NOTIFICATION
"Pgd Dr. Plascencia at 2030    \"Pt stated he is now willing to have NG tube placed. Could you place an order? Thanks\"    Ceci #[id number]    Received orders for NG tube, however Pt now refusing.     Pgd Dr. Lomas at 2134    \"FYI: Pt now refusing NG tube as he 'passed a lot of gas' and is feeling better. Thanks\"    Ceci #[id number]  "

## 2022-03-15 NOTE — PROVIDER NOTIFICATION
"Pgd Dr. Plascencia at 1942    \"Pt refused placement of NG tube when asked. Requesting medication for heartburn. Thanks\"    Ceci #[id number]      Received order for TID PRN maalox    "

## 2022-03-16 LAB
GLUCOSE BLDC GLUCOMTR-MCNC: 181 MG/DL (ref 70–99)
GLUCOSE BLDC GLUCOMTR-MCNC: 236 MG/DL (ref 70–99)
GLUCOSE BLDC GLUCOMTR-MCNC: 249 MG/DL (ref 70–99)
GLUCOSE BLDC GLUCOMTR-MCNC: 354 MG/DL (ref 70–99)
GLUCOSE BLDC GLUCOMTR-MCNC: 357 MG/DL (ref 70–99)
GLUCOSE BLDC GLUCOMTR-MCNC: 388 MG/DL (ref 70–99)
GLUCOSE BLDC GLUCOMTR-MCNC: 397 MG/DL (ref 70–99)

## 2022-03-16 PROCEDURE — 250N000013 HC RX MED GY IP 250 OP 250 PS 637: Performed by: STUDENT IN AN ORGANIZED HEALTH CARE EDUCATION/TRAINING PROGRAM

## 2022-03-16 PROCEDURE — 258N000003 HC RX IP 258 OP 636: Performed by: STUDENT IN AN ORGANIZED HEALTH CARE EDUCATION/TRAINING PROGRAM

## 2022-03-16 PROCEDURE — 99232 SBSQ HOSP IP/OBS MODERATE 35: CPT | Mod: GC | Performed by: INTERNAL MEDICINE

## 2022-03-16 PROCEDURE — 120N000002 HC R&B MED SURG/OB UMMC

## 2022-03-16 PROCEDURE — 99207 PR CDG-MDM COMPONENT: MEETS MODERATE - DOWN CODED: CPT | Performed by: INTERNAL MEDICINE

## 2022-03-16 PROCEDURE — 258N000003 HC RX IP 258 OP 636: Performed by: EMERGENCY MEDICINE

## 2022-03-16 PROCEDURE — 250N000012 HC RX MED GY IP 250 OP 636 PS 637: Performed by: STUDENT IN AN ORGANIZED HEALTH CARE EDUCATION/TRAINING PROGRAM

## 2022-03-16 RX ORDER — POLYETHYLENE GLYCOL 3350 17 G/17G
17 POWDER, FOR SOLUTION ORAL DAILY
Qty: 510 G | Refills: 3 | Status: SHIPPED | OUTPATIENT
Start: 2022-03-16 | End: 2022-08-31

## 2022-03-16 RX ORDER — SODIUM CHLORIDE, SODIUM LACTATE, POTASSIUM CHLORIDE, CALCIUM CHLORIDE 600; 310; 30; 20 MG/100ML; MG/100ML; MG/100ML; MG/100ML
INJECTION, SOLUTION INTRAVENOUS CONTINUOUS
Status: DISCONTINUED | OUTPATIENT
Start: 2022-03-16 | End: 2022-03-17

## 2022-03-16 RX ORDER — SIMETHICONE 80 MG
80 TABLET,CHEWABLE ORAL EVERY 6 HOURS PRN
Qty: 90 TABLET | Refills: 0 | Status: SHIPPED | OUTPATIENT
Start: 2022-03-16 | End: 2022-08-31

## 2022-03-16 RX ORDER — CIPROFLOXACIN 500 MG/1
500 TABLET, FILM COATED ORAL 2 TIMES DAILY
Qty: 26 TABLET | Refills: 0 | Status: ON HOLD | OUTPATIENT
Start: 2022-03-16 | End: 2022-05-29

## 2022-03-16 RX ORDER — PANTOPRAZOLE SODIUM 40 MG/1
40 TABLET, DELAYED RELEASE ORAL DAILY
Status: DISCONTINUED | OUTPATIENT
Start: 2022-03-16 | End: 2022-03-17 | Stop reason: HOSPADM

## 2022-03-16 RX ORDER — NALOXONE HYDROCHLORIDE 0.4 MG/ML
0.4 INJECTION, SOLUTION INTRAMUSCULAR; INTRAVENOUS; SUBCUTANEOUS
Status: DISCONTINUED | OUTPATIENT
Start: 2022-03-16 | End: 2022-03-17 | Stop reason: HOSPADM

## 2022-03-16 RX ORDER — NALOXONE HYDROCHLORIDE 0.4 MG/ML
0.2 INJECTION, SOLUTION INTRAMUSCULAR; INTRAVENOUS; SUBCUTANEOUS
Status: DISCONTINUED | OUTPATIENT
Start: 2022-03-16 | End: 2022-03-17 | Stop reason: HOSPADM

## 2022-03-16 RX ORDER — OXYCODONE HYDROCHLORIDE 5 MG/1
5 TABLET ORAL EVERY 6 HOURS PRN
Status: DISCONTINUED | OUTPATIENT
Start: 2022-03-16 | End: 2022-03-17 | Stop reason: HOSPADM

## 2022-03-16 RX ORDER — CELECOXIB 50 MG/1
50 CAPSULE ORAL 2 TIMES DAILY PRN
Status: DISCONTINUED | OUTPATIENT
Start: 2022-03-16 | End: 2022-03-16

## 2022-03-16 RX ORDER — ACETAMINOPHEN 325 MG/1
975 TABLET ORAL EVERY 4 HOURS PRN
Status: DISCONTINUED | OUTPATIENT
Start: 2022-03-16 | End: 2022-03-16

## 2022-03-16 RX ADMIN — SODIUM CHLORIDE, POTASSIUM CHLORIDE, SODIUM LACTATE AND CALCIUM CHLORIDE: 600; 310; 30; 20 INJECTION, SOLUTION INTRAVENOUS at 16:17

## 2022-03-16 RX ADMIN — SIMETHICONE 80 MG: 80 TABLET, CHEWABLE ORAL at 05:01

## 2022-03-16 RX ADMIN — IBUPROFEN 600 MG: 600 TABLET, FILM COATED ORAL at 06:05

## 2022-03-16 RX ADMIN — SODIUM CHLORIDE: 9 INJECTION, SOLUTION INTRAVENOUS at 02:03

## 2022-03-16 RX ADMIN — CIPROFLOXACIN 500 MG: 500 TABLET, COATED ORAL at 19:55

## 2022-03-16 RX ADMIN — SIMETHICONE 80 MG: 80 TABLET, CHEWABLE ORAL at 20:00

## 2022-03-16 RX ADMIN — POLYETHYLENE GLYCOL 3350 17 G: 17 POWDER, FOR SOLUTION ORAL at 19:54

## 2022-03-16 RX ADMIN — POLYETHYLENE GLYCOL 3350 17 G: 17 POWDER, FOR SOLUTION ORAL at 08:02

## 2022-03-16 RX ADMIN — INSULIN ASPART 4 UNITS: 100 INJECTION, SOLUTION INTRAVENOUS; SUBCUTANEOUS at 12:31

## 2022-03-16 RX ADMIN — PANTOPRAZOLE SODIUM 40 MG: 40 TABLET, DELAYED RELEASE ORAL at 17:07

## 2022-03-16 RX ADMIN — BICTEGRAVIR SODIUM, EMTRICITABINE, AND TENOFOVIR ALAFENAMIDE FUMARATE 1 TABLET: 50; 200; 25 TABLET ORAL at 08:01

## 2022-03-16 RX ADMIN — CIPROFLOXACIN 500 MG: 500 TABLET, COATED ORAL at 08:01

## 2022-03-16 RX ADMIN — INSULIN DETEMIR 10 UNITS: 100 INJECTION, SOLUTION SUBCUTANEOUS at 08:02

## 2022-03-16 RX ADMIN — INSULIN ASPART 5 UNITS: 100 INJECTION, SOLUTION INTRAVENOUS; SUBCUTANEOUS at 08:26

## 2022-03-16 ASSESSMENT — ACTIVITIES OF DAILY LIVING (ADL)
ADLS_ACUITY_SCORE: 5

## 2022-03-16 NOTE — PROGRESS NOTES
[facility]     Progress Note - [facility] 1 Service        Date of Admission:  3/14/2022    Chief Complaint   Andrew Lockwood is a 54 year old male admitted on 3/14/2022. He has a history of recurrent SBO, T2DM (A1c 11.2% 02/18/22), HIV/AIDS previously c/b CNS toxoplasmosis, and GERD with prior and is admitted for abdominal pain c/f recurrence of SBO, with return of concern for SBO after attempting solid foods.      Assessment & Plan     UPDATES:  - Initially tolerated liquid diet, abdominal pain and obstipation resumed after return to solid foods, returned to NPO  - Ciprofloxacin for small intestine bacterial overgrowth (SIBO)    Abdominal pain  Small bowel obstruction, partial, recurrence; improved  Recurrent SBO with multiple ED presentations, especially in the past 2-3 months. Prior appendectomy, ex lap with lysis of adhesions (2016, 2018). Last small bowel enteroscopy and colonoscopy (01/2016) without findings on exam or pathology to explain recurrences. Current Lipase, LFTs, WBC wnl. Given narrative of recurrent episodes of pain and concurrent diarrhea, presentations may reflect chronic constipation with encopresis, no PTA bowel regimen.   - CLD ADAT  - Pain control: (1) Ibuprofen   - GI consulted, signed off:              - Trial of antibiotics therapy for SIBO ( Consider Cipro 500mg BID X14 days)              - No endoscopic intervention at this time              - Continue conservative management                - Please consider consulting surgery for their input   - Miralax BID scheduled, for concerns r/t chronic constipation   - Simethicone and GI cocktail PRN  - Has referrals in place from previous admissions for outpatient GI clinic follow-up  > Will discuss with Gen Surg in AM if pain not improved     T2DM  He describes difficulties with ensuring access to glucose monitoring supplies.   - PTA LA insulin while NPO  - Medium-dose sliding scale insulin  -  Consideration for SGLT2i while inpatient after tolerating diet      Housing Insecurity  Presents from shelter, declined  assistance     =================  CHRONIC/RESOLVED  =================  GERD - PTA pantoprazole  HIV w/ Hx CNS toxoplasmosis - Continue PTA biktarvy. VL 0, CD4 500+ (08/2021).         Diet: Diet  NPO for Medical/Clinical Reasons Except for: Meds, Ice Chips    Fluids: LR @ 100mL/hr  Lines/Tubes/Drains:   Peripheral IV 03/14/22 Anterior;Left Lower forearm (Active)   Site Assessment WDL 03/16/22 0816   Line Status Infusing 03/16/22 0816   Phlebitis Scale 0-->no symptoms 03/16/22 0816   Infiltration Scale 0 03/16/22 0816   Number of days: 2    Not present  DVT Prophylaxis: Low Risk/Ambulatory with no VTE prophylaxis indicated  Code Status: Full Code         Disposition Plan   Expected discharge: 2 - 3 days, recommended to prior living arrangement once adequate pain management/ tolerating PO medications.  Entered: Osbaldo Raines MD 03/16/2022, 3:02 PM       The patient's care was discussed with the Attending Physician, Dr. Nirmal Quevedo.    Osbaldo Raines MD, PhD  PGY-2 Internal Medcine  [phone] [text page]     [facility]ina [address] [place] [address]   Please see sign in/sign out for up to date coverage information  ______________________________________________________________________    Interval History   Seemingly tolerated CLD well, though with popsicles his BG spiked. Transitioned to consistent carb diet in afternoon with redevelopment of abdominal pain. Made NPO again. Had initially anticipated ability to discharge today given improvements, but amenable to staying one further day. No fever, mild N/V, no BM, endorses obstipation in late afternoon.      Physical Exam   Vital Signs: Temp: 98.8  F (37.1  C) Temp src: Oral BP: 115/60 Pulse: 88   Resp: 16 SpO2: 98 % O2 Device: None (Room air)    Weight: 157 lbs 8 oz  Physical Exam  Constitutional:        Appearance: He is obese.   HENT:      Head: Normocephalic and atraumatic.   Cardiovascular:      Rate and Rhythm: Normal rate and regular rhythm.      Pulses: Normal pulses.      Heart sounds: Normal heart sounds.   Pulmonary:      Effort: Pulmonary effort is normal.      Breath sounds: Normal breath sounds.   Abdominal:      General: There is no distension.      Palpations: There is no mass.      Tenderness: There is no abdominal tenderness. There is no right CVA tenderness, left CVA tenderness or guarding.      Comments: NABS. No tenderness.    Musculoskeletal:      Right lower leg: No edema.      Left lower leg: No edema.   Skin:     General: Skin is warm and dry.   Neurological:      Mental Status: He is alert.   Psychiatric:         Mood and Affect: Mood normal.         Thought Content: Thought content normal.      Comments: Near-constant movement, seemingly unrelated to pain symptoms, not purposeful or stereotypic       Data   Data reviewed today: I reviewed all medications, new labs and imaging results over the last 24 hours. I personally reviewed no images or EKG's today.  Recent Labs   Lab 03/16/22  1205 03/16/22  1036 03/16/22  0825 03/15/22  0545 03/15/22  0533 03/14/22  1802 03/14/22  1145   WBC  --   --   --   --  6.0  --  10.6   HGB  --   --   --   --  11.8*  --  12.8*   MCV  --   --   --   --  86  --  87   PLT  --   --   --   --  328  --  335   NA  --   --   --   --  137  --  138   POTASSIUM  --   --   --   --  3.9  --  3.6   CHLORIDE  --   --   --   --  106  --  105   CO2  --   --   --   --  27  --  26   BUN  --   --   --   --  15  --  17   CR  --   --   --   --  0.62*  --  0.70   ANIONGAP  --   --   --   --  4  --  7   LINDA  --   --   --   --  8.8  --  9.4   * 181* 357*   < > 144*   < > 189*   ALBUMIN  --   --   --   --  3.0*  --  3.5   PROTTOTAL  --   --   --   --  6.8  --  8.1   BILITOTAL  --   --   --   --  0.5  --  0.5   ALKPHOS  --   --   --   --  101  --  130   ALT  --   --   --   --  16   --  16   AST  --   --   --   --  12  --  16   LIPASE  --   --   --   --   --   --  134    < > = values in this interval not displayed.

## 2022-03-16 NOTE — PLAN OF CARE
"[id number]:     /78 (BP Location: Right arm)   Pulse 89   Temp 97.9  F (36.6  C) (Axillary)   Resp 18   Ht 1.626 m (5' 4\")   Wt 71.9 kg (158 lb 8.2 oz)   SpO2 99%   BMI 27.21 kg/m        NEURO: Intact, A&O x4.      RESPIRATORY:  Pt denies having any SOB and no cough noted.      CARDIO: WDL.      GI/:  Pt voiding spontaneously with AUOP. Last BM: 3/14/22. Pt denies having any N/V. Pt tolerating clear liquid diet well - advanced to full liquid diet. Pt has eaten several popsicles during the night. Pt reported some heartburn this morning, PRN simethicone given x1 with some relief.      SKIN: WDL.      ACTIVITY: Independent and up ad lexi.      PAIN: Pt continues to have intermittent abdominal pain. PRN ibuprofen given x1 with some relief.       LDA: PIV (left) - NS infusing @ 125ml/hr.      BG: Blood glucose @ 0200 = 397 (5 unit sliding scale insulin coverage given); Blood glucose @ 0600 = 388 (5 units sliding scale insulin coverage required) - pt educated on carbs and sugars in the popsicles he has been eating.     "

## 2022-03-16 NOTE — PLAN OF CARE
"[id number]    Blood pressure 111/58, pulse 86, temperature 98.6  F (37  C), temperature source Oral, resp. rate 18, height 1.626 m (5' 4\"), weight 71.4 kg (157 lb 8 oz), SpO2 99 %.    VSS. AO x4. , 181, 236; Novolog carb coverage. Voiding spontaneously. Pain after eating regular diet meal. No BM yet. Pt states that he is not passing gas. Pt refusing ibuprofen due to its ineffectiveness. Pt started on NPO, PRN oxycodone. Pt refused NG tube. Up ad lexi. PIV saline locked.     Continue w/ POC.    "

## 2022-03-17 VITALS
DIASTOLIC BLOOD PRESSURE: 84 MMHG | RESPIRATION RATE: 16 BRPM | WEIGHT: 157.5 LBS | HEIGHT: 64 IN | SYSTOLIC BLOOD PRESSURE: 132 MMHG | OXYGEN SATURATION: 99 % | HEART RATE: 84 BPM | TEMPERATURE: 97.7 F | BODY MASS INDEX: 26.89 KG/M2

## 2022-03-17 LAB
ALBUMIN SERPL-MCNC: 3 G/DL (ref 3.4–5)
ALP SERPL-CCNC: 114 U/L (ref 40–150)
ALT SERPL W P-5'-P-CCNC: 14 U/L (ref 0–70)
ANION GAP SERPL CALCULATED.3IONS-SCNC: 5 MMOL/L (ref 3–14)
AST SERPL W P-5'-P-CCNC: 9 U/L (ref 0–45)
BILIRUB SERPL-MCNC: 0.4 MG/DL (ref 0.2–1.3)
BUN SERPL-MCNC: 10 MG/DL (ref 7–30)
CALCIUM SERPL-MCNC: 9.2 MG/DL (ref 8.5–10.1)
CHLORIDE BLD-SCNC: 104 MMOL/L (ref 94–109)
CO2 SERPL-SCNC: 28 MMOL/L (ref 20–32)
CREAT SERPL-MCNC: 0.61 MG/DL (ref 0.66–1.25)
ERYTHROCYTE [DISTWIDTH] IN BLOOD BY AUTOMATED COUNT: 12.8 % (ref 10–15)
GFR SERPL CREATININE-BSD FRML MDRD: >90 ML/MIN/1.73M2
GLUCOSE BLD-MCNC: 140 MG/DL (ref 70–99)
GLUCOSE BLDC GLUCOMTR-MCNC: 117 MG/DL (ref 70–99)
GLUCOSE BLDC GLUCOMTR-MCNC: 124 MG/DL (ref 70–99)
GLUCOSE BLDC GLUCOMTR-MCNC: 191 MG/DL (ref 70–99)
GLUCOSE BLDC GLUCOMTR-MCNC: 227 MG/DL (ref 70–99)
GLUCOSE BLDC GLUCOMTR-MCNC: 396 MG/DL (ref 70–99)
HCT VFR BLD AUTO: 36 % (ref 40–53)
HGB BLD-MCNC: 11.9 G/DL (ref 13.3–17.7)
MCH RBC QN AUTO: 27.9 PG (ref 26.5–33)
MCHC RBC AUTO-ENTMCNC: 33.1 G/DL (ref 31.5–36.5)
MCV RBC AUTO: 85 FL (ref 78–100)
PLATELET # BLD AUTO: 349 10E3/UL (ref 150–450)
POTASSIUM BLD-SCNC: 3.4 MMOL/L (ref 3.4–5.3)
PROT SERPL-MCNC: 7 G/DL (ref 6.8–8.8)
RBC # BLD AUTO: 4.26 10E6/UL (ref 4.4–5.9)
SODIUM SERPL-SCNC: 137 MMOL/L (ref 133–144)
WBC # BLD AUTO: 6 10E3/UL (ref 4–11)

## 2022-03-17 PROCEDURE — 250N000013 HC RX MED GY IP 250 OP 250 PS 637: Performed by: STUDENT IN AN ORGANIZED HEALTH CARE EDUCATION/TRAINING PROGRAM

## 2022-03-17 PROCEDURE — 36415 COLL VENOUS BLD VENIPUNCTURE: CPT | Performed by: STUDENT IN AN ORGANIZED HEALTH CARE EDUCATION/TRAINING PROGRAM

## 2022-03-17 PROCEDURE — 999N000127 HC STATISTIC PERIPHERAL IV START W US GUIDANCE

## 2022-03-17 PROCEDURE — 85014 HEMATOCRIT: CPT | Performed by: STUDENT IN AN ORGANIZED HEALTH CARE EDUCATION/TRAINING PROGRAM

## 2022-03-17 PROCEDURE — 258N000003 HC RX IP 258 OP 636: Performed by: STUDENT IN AN ORGANIZED HEALTH CARE EDUCATION/TRAINING PROGRAM

## 2022-03-17 PROCEDURE — 80053 COMPREHEN METABOLIC PANEL: CPT | Performed by: STUDENT IN AN ORGANIZED HEALTH CARE EDUCATION/TRAINING PROGRAM

## 2022-03-17 PROCEDURE — 99239 HOSP IP/OBS DSCHRG MGMT >30: CPT | Mod: GC | Performed by: INTERNAL MEDICINE

## 2022-03-17 RX ORDER — METFORMIN HCL 500 MG
500 TABLET, EXTENDED RELEASE 24 HR ORAL
Status: DISCONTINUED | OUTPATIENT
Start: 2022-03-17 | End: 2022-03-17 | Stop reason: HOSPADM

## 2022-03-17 RX ORDER — METFORMIN HCL 500 MG
500 TABLET, EXTENDED RELEASE 24 HR ORAL
Qty: 90 TABLET | Refills: 0
Start: 2022-03-17 | End: 2022-03-17

## 2022-03-17 RX ORDER — METFORMIN HCL 500 MG
500 TABLET, EXTENDED RELEASE 24 HR ORAL
Qty: 90 TABLET | Refills: 0 | Status: ON HOLD | OUTPATIENT
Start: 2022-03-17 | End: 2022-05-29

## 2022-03-17 RX ADMIN — PANTOPRAZOLE SODIUM 40 MG: 40 TABLET, DELAYED RELEASE ORAL at 09:00

## 2022-03-17 RX ADMIN — POLYETHYLENE GLYCOL 3350 17 G: 17 POWDER, FOR SOLUTION ORAL at 09:00

## 2022-03-17 RX ADMIN — CIPROFLOXACIN 500 MG: 500 TABLET, COATED ORAL at 09:00

## 2022-03-17 RX ADMIN — SODIUM CHLORIDE, POTASSIUM CHLORIDE, SODIUM LACTATE AND CALCIUM CHLORIDE: 600; 310; 30; 20 INJECTION, SOLUTION INTRAVENOUS at 02:20

## 2022-03-17 RX ADMIN — BICTEGRAVIR SODIUM, EMTRICITABINE, AND TENOFOVIR ALAFENAMIDE FUMARATE 1 TABLET: 50; 200; 25 TABLET ORAL at 09:00

## 2022-03-17 RX ADMIN — SODIUM CHLORIDE, POTASSIUM CHLORIDE, SODIUM LACTATE AND CALCIUM CHLORIDE: 600; 310; 30; 20 INJECTION, SOLUTION INTRAVENOUS at 03:15

## 2022-03-17 ASSESSMENT — ACTIVITIES OF DAILY LIVING (ADL)
ADLS_ACUITY_SCORE: 5

## 2022-03-17 NOTE — PLAN OF CARE
"[id number]:     /84 (BP Location: Right arm)   Pulse 84   Temp 97.7  F (36.5  C) (Oral)   Resp 16   Ht 1.626 m (5' 4\")   Wt 71.4 kg (157 lb 8 oz)   SpO2 99%   BMI 27.03 kg/m        NEURO: Intact, A&O x4.      RESPIRATORY:  Pt denies having any SOB and no cough noted.      CARDIO: WDL.      GI/:  Pt voiding spontaneously with AUOP. Last BM: 3/16/22. Pt denies having any N/V. Pt continues to be NPO over night.      SKIN: WDL.      ACTIVITY: Independent and up ad lexi.      PAIN: Pt denies having any pain overnight.      LDA: PIV (left) - pain at site, IV removed; New PIV (left) - LR infusing @ 100ml/hr.     BG: Blood glucose @ 0200 = 396, provider notified and gave 1x order for 5 units of novolog - new blood glucose check and novolog orders placed. Ordered 1x dose of 10 units of novolog - dose held for BG < 300 - BG = 117.         "

## 2022-03-17 NOTE — PLAN OF CARE
[id number]    A/Ox4. Pt refused VS. Denies pain/nausea/SOB. No BM today. Advanced from full liquid diet to regular diet. Pt did not order regular diet meal yet. BG checks changed to ACHS. , 2 units insulin given per sliding scale. Pt states cell phone is missing. Room was searched and security called, phone still not found. Plan to discharge when tolerating regular diet.

## 2022-03-17 NOTE — PROVIDER NOTIFICATION
Paged:     MD Cesilia #[phone]    Pt . No current insulin orders. Previously received scheduled novolog q4 hrs per sliding scale. Provide insulin coverage? Thanks.     Christel [id number]

## 2022-03-17 NOTE — PLAN OF CARE
"Goal Outcome Evaluation:    [id number]          /67 (BP Location: Right arm)   Pulse 92   Temp 98.4  F (36.9  C) (Oral)   Resp 16   Ht 1.626 m (5' 4\")   Wt 71.4 kg (157 lb 8 oz)   SpO2 99%   BMI 27.03 kg/m        A&O. VSS, afebrile. Denied SOB/n/v. Reported occ abdominal pain, denied interventions. Reported abdominal bloating and heart burn, PRN simethicone given with relief.  and 249. NPO for abdominal pain, work up for SBO. Pt does not want NG tube. LBM 3/16 Good UOP. Ambulates independently. L PIV infusing with LR @100ml/hr. Initially, discharge goal was for tomorrow. Continue with POC.          "

## 2022-03-18 ENCOUNTER — PATIENT OUTREACH (OUTPATIENT)
Dept: CARE COORDINATION | Facility: CLINIC | Age: 57
End: 2022-03-18
Payer: COMMERCIAL

## 2022-03-18 DIAGNOSIS — Z71.89 OTHER SPECIFIED COUNSELING: ICD-10-CM

## 2022-03-18 NOTE — PROGRESS NOTES
Clinic Care Coordination Contact  [facility]/Voicemail       Clinical Data: Care Coordinator Outreach    Outreach attempted x 1.  Unable to leave message on patient's voicemail with call back information, receive message that states call rejected.  Unclear from notes if pt even has a phone.    Plan:  Care Coordinator will do no further outreaches at this time.    Gill Miranda, ALLY  [phone]  [facility]

## 2022-03-22 ENCOUNTER — APPOINTMENT (OUTPATIENT)
Dept: CT IMAGING | Facility: CLINIC | Age: 57
End: 2022-03-22
Attending: EMERGENCY MEDICINE
Payer: COMMERCIAL

## 2022-03-22 ENCOUNTER — HOSPITAL ENCOUNTER (INPATIENT)
Facility: CLINIC | Age: 57
LOS: 2 days | Discharge: LEFT BEFORE DISCHARGE | End: 2022-03-24
Attending: EMERGENCY MEDICINE | Admitting: INTERNAL MEDICINE
Payer: COMMERCIAL

## 2022-03-22 ENCOUNTER — APPOINTMENT (OUTPATIENT)
Dept: GENERAL RADIOLOGY | Facility: CLINIC | Age: 57
End: 2022-03-22
Attending: EMERGENCY MEDICINE
Payer: COMMERCIAL

## 2022-03-22 DIAGNOSIS — K56.609 SMALL BOWEL OBSTRUCTION (H): ICD-10-CM

## 2022-03-22 DIAGNOSIS — Z11.52 ENCOUNTER FOR SCREENING LABORATORY TESTING FOR SEVERE ACUTE RESPIRATORY SYNDROME CORONAVIRUS 2 (SARS-COV-2): ICD-10-CM

## 2022-03-22 LAB
ALBUMIN SERPL-MCNC: 3.7 G/DL (ref 3.4–5)
ALP SERPL-CCNC: 123 U/L (ref 40–150)
ALT SERPL W P-5'-P-CCNC: 18 U/L (ref 0–70)
ANION GAP SERPL CALCULATED.3IONS-SCNC: 8 MMOL/L (ref 3–14)
AST SERPL W P-5'-P-CCNC: 10 U/L (ref 0–45)
BASOPHILS # BLD AUTO: 0.1 10E3/UL (ref 0–0.2)
BASOPHILS NFR BLD AUTO: 1 %
BILIRUB SERPL-MCNC: 0.5 MG/DL (ref 0.2–1.3)
BUN SERPL-MCNC: 17 MG/DL (ref 7–30)
CALCIUM SERPL-MCNC: 9.4 MG/DL (ref 8.5–10.1)
CHLORIDE BLD-SCNC: 106 MMOL/L (ref 94–109)
CO2 SERPL-SCNC: 22 MMOL/L (ref 20–32)
CREAT SERPL-MCNC: 0.75 MG/DL (ref 0.66–1.25)
EOSINOPHIL # BLD AUTO: 0.2 10E3/UL (ref 0–0.7)
EOSINOPHIL NFR BLD AUTO: 2 %
ERYTHROCYTE [DISTWIDTH] IN BLOOD BY AUTOMATED COUNT: 13 % (ref 10–15)
GFR SERPL CREATININE-BSD FRML MDRD: >90 ML/MIN/1.73M2
GLUCOSE BLD-MCNC: 227 MG/DL (ref 70–99)
GLUCOSE BLDC GLUCOMTR-MCNC: 142 MG/DL (ref 70–99)
GLUCOSE BLDC GLUCOMTR-MCNC: 148 MG/DL (ref 70–99)
HCT VFR BLD AUTO: 39.9 % (ref 40–53)
HGB BLD-MCNC: 13 G/DL (ref 13.3–17.7)
HOLD SPECIMEN: NORMAL
IMM GRANULOCYTES # BLD: 0.1 10E3/UL
IMM GRANULOCYTES NFR BLD: 1 %
LIPASE SERPL-CCNC: 137 U/L (ref 73–393)
LYMPHOCYTES # BLD AUTO: 3.5 10E3/UL (ref 0.8–5.3)
LYMPHOCYTES NFR BLD AUTO: 33 %
MCH RBC QN AUTO: 27.9 PG (ref 26.5–33)
MCHC RBC AUTO-ENTMCNC: 32.6 G/DL (ref 31.5–36.5)
MCV RBC AUTO: 86 FL (ref 78–100)
MONOCYTES # BLD AUTO: 0.6 10E3/UL (ref 0–1.3)
MONOCYTES NFR BLD AUTO: 6 %
NEUTROPHILS # BLD AUTO: 5.9 10E3/UL (ref 1.6–8.3)
NEUTROPHILS NFR BLD AUTO: 57 %
NRBC # BLD AUTO: 0 10E3/UL
NRBC BLD AUTO-RTO: 0 /100
PLATELET # BLD AUTO: 460 10E3/UL (ref 150–450)
POTASSIUM BLD-SCNC: 3.6 MMOL/L (ref 3.4–5.3)
PROT SERPL-MCNC: 8.2 G/DL (ref 6.8–8.8)
RBC # BLD AUTO: 4.66 10E6/UL (ref 4.4–5.9)
SARS-COV-2 RNA RESP QL NAA+PROBE: NEGATIVE
SODIUM SERPL-SCNC: 136 MMOL/L (ref 133–144)
WBC # BLD AUTO: 10.3 10E3/UL (ref 4–11)

## 2022-03-22 PROCEDURE — 99285 EMERGENCY DEPT VISIT HI MDM: CPT | Mod: 25 | Performed by: EMERGENCY MEDICINE

## 2022-03-22 PROCEDURE — 250N000013 HC RX MED GY IP 250 OP 250 PS 637: Performed by: EMERGENCY MEDICINE

## 2022-03-22 PROCEDURE — 250N000009 HC RX 250: Performed by: EMERGENCY MEDICINE

## 2022-03-22 PROCEDURE — 250N000011 HC RX IP 250 OP 636: Performed by: NURSE PRACTITIONER

## 2022-03-22 PROCEDURE — 99223 1ST HOSP IP/OBS HIGH 75: CPT | Mod: AI | Performed by: INTERNAL MEDICINE

## 2022-03-22 PROCEDURE — 74019 RADEX ABDOMEN 2 VIEWS: CPT | Mod: 26 | Performed by: RADIOLOGY

## 2022-03-22 PROCEDURE — 96374 THER/PROPH/DIAG INJ IV PUSH: CPT | Performed by: EMERGENCY MEDICINE

## 2022-03-22 PROCEDURE — 74177 CT ABD & PELVIS W/CONTRAST: CPT | Mod: 26 | Performed by: RADIOLOGY

## 2022-03-22 PROCEDURE — 93010 ELECTROCARDIOGRAM REPORT: CPT | Performed by: EMERGENCY MEDICINE

## 2022-03-22 PROCEDURE — 258N000003 HC RX IP 258 OP 636: Performed by: EMERGENCY MEDICINE

## 2022-03-22 PROCEDURE — 96375 TX/PRO/DX INJ NEW DRUG ADDON: CPT | Performed by: EMERGENCY MEDICINE

## 2022-03-22 PROCEDURE — 74177 CT ABD & PELVIS W/CONTRAST: CPT

## 2022-03-22 PROCEDURE — 999N000248 HC STATISTIC IV INSERT WITH US BY RN

## 2022-03-22 PROCEDURE — 96361 HYDRATE IV INFUSION ADD-ON: CPT | Performed by: EMERGENCY MEDICINE

## 2022-03-22 PROCEDURE — 258N000003 HC RX IP 258 OP 636: Performed by: NURSE PRACTITIONER

## 2022-03-22 PROCEDURE — 85025 COMPLETE CBC W/AUTO DIFF WBC: CPT | Performed by: NURSE PRACTITIONER

## 2022-03-22 PROCEDURE — 250N000011 HC RX IP 250 OP 636: Performed by: EMERGENCY MEDICINE

## 2022-03-22 PROCEDURE — 80053 COMPREHEN METABOLIC PANEL: CPT | Performed by: NURSE PRACTITIONER

## 2022-03-22 PROCEDURE — U0003 INFECTIOUS AGENT DETECTION BY NUCLEIC ACID (DNA OR RNA); SEVERE ACUTE RESPIRATORY SYNDROME CORONAVIRUS 2 (SARS-COV-2) (CORONAVIRUS DISEASE [COVID-19]), AMPLIFIED PROBE TECHNIQUE, MAKING USE OF HIGH THROUGHPUT TECHNOLOGIES AS DESCRIBED BY CMS-2020-01-R: HCPCS | Performed by: EMERGENCY MEDICINE

## 2022-03-22 PROCEDURE — 74019 RADEX ABDOMEN 2 VIEWS: CPT

## 2022-03-22 PROCEDURE — 99207 PR APP CREDIT; MD BILLING SHARED VISIT: CPT | Performed by: PHYSICIAN ASSISTANT

## 2022-03-22 PROCEDURE — 36415 COLL VENOUS BLD VENIPUNCTURE: CPT | Performed by: NURSE PRACTITIONER

## 2022-03-22 PROCEDURE — C9113 INJ PANTOPRAZOLE SODIUM, VIA: HCPCS | Performed by: NURSE PRACTITIONER

## 2022-03-22 PROCEDURE — 250N000013 HC RX MED GY IP 250 OP 250 PS 637: Performed by: PHYSICIAN ASSISTANT

## 2022-03-22 PROCEDURE — 250N000012 HC RX MED GY IP 250 OP 636 PS 637: Performed by: PHYSICIAN ASSISTANT

## 2022-03-22 PROCEDURE — 120N000002 HC R&B MED SURG/OB UMMC

## 2022-03-22 PROCEDURE — 36415 COLL VENOUS BLD VENIPUNCTURE: CPT | Performed by: EMERGENCY MEDICINE

## 2022-03-22 PROCEDURE — C9803 HOPD COVID-19 SPEC COLLECT: HCPCS | Performed by: EMERGENCY MEDICINE

## 2022-03-22 PROCEDURE — 83690 ASSAY OF LIPASE: CPT | Performed by: EMERGENCY MEDICINE

## 2022-03-22 RX ORDER — SODIUM CHLORIDE 9 MG/ML
INJECTION, SOLUTION INTRAVENOUS CONTINUOUS
Status: DISCONTINUED | OUTPATIENT
Start: 2022-03-22 | End: 2022-03-24 | Stop reason: HOSPADM

## 2022-03-22 RX ORDER — IOPAMIDOL 755 MG/ML
96 INJECTION, SOLUTION INTRAVASCULAR ONCE
Status: COMPLETED | OUTPATIENT
Start: 2022-03-22 | End: 2022-03-22

## 2022-03-22 RX ORDER — HYDROMORPHONE HYDROCHLORIDE 2 MG/1
2-4 TABLET ORAL EVERY 4 HOURS PRN
Status: DISCONTINUED | OUTPATIENT
Start: 2022-03-22 | End: 2022-03-24 | Stop reason: HOSPADM

## 2022-03-22 RX ORDER — DEXTROSE MONOHYDRATE 25 G/50ML
25-50 INJECTION, SOLUTION INTRAVENOUS
Status: DISCONTINUED | OUTPATIENT
Start: 2022-03-22 | End: 2022-03-24 | Stop reason: HOSPADM

## 2022-03-22 RX ORDER — HYDROMORPHONE HYDROCHLORIDE 1 MG/ML
0.5 INJECTION, SOLUTION INTRAMUSCULAR; INTRAVENOUS; SUBCUTANEOUS ONCE
Status: COMPLETED | OUTPATIENT
Start: 2022-03-22 | End: 2022-03-22

## 2022-03-22 RX ORDER — ONDANSETRON 2 MG/ML
4 INJECTION INTRAMUSCULAR; INTRAVENOUS EVERY 30 MIN PRN
Status: DISCONTINUED | OUTPATIENT
Start: 2022-03-22 | End: 2022-03-24 | Stop reason: HOSPADM

## 2022-03-22 RX ORDER — LIDOCAINE 40 MG/G
CREAM TOPICAL
Status: DISCONTINUED | OUTPATIENT
Start: 2022-03-22 | End: 2022-03-24 | Stop reason: HOSPADM

## 2022-03-22 RX ORDER — NICOTINE POLACRILEX 4 MG
15-30 LOZENGE BUCCAL
Status: DISCONTINUED | OUTPATIENT
Start: 2022-03-22 | End: 2022-03-24 | Stop reason: HOSPADM

## 2022-03-22 RX ORDER — ONDANSETRON 4 MG/1
4 TABLET, ORALLY DISINTEGRATING ORAL EVERY 6 HOURS PRN
Status: DISCONTINUED | OUTPATIENT
Start: 2022-03-22 | End: 2022-03-24 | Stop reason: HOSPADM

## 2022-03-22 RX ORDER — ONDANSETRON 2 MG/ML
4 INJECTION INTRAMUSCULAR; INTRAVENOUS EVERY 6 HOURS PRN
Status: DISCONTINUED | OUTPATIENT
Start: 2022-03-22 | End: 2022-03-24 | Stop reason: HOSPADM

## 2022-03-22 RX ORDER — HYDROMORPHONE HYDROCHLORIDE 1 MG/ML
.3-.5 INJECTION, SOLUTION INTRAMUSCULAR; INTRAVENOUS; SUBCUTANEOUS
Status: DISCONTINUED | OUTPATIENT
Start: 2022-03-22 | End: 2022-03-24 | Stop reason: HOSPADM

## 2022-03-22 RX ADMIN — ONDANSETRON 4 MG: 2 INJECTION INTRAMUSCULAR; INTRAVENOUS at 14:50

## 2022-03-22 RX ADMIN — PANTOPRAZOLE SODIUM 40 MG: 40 INJECTION, POWDER, FOR SOLUTION INTRAVENOUS at 14:50

## 2022-03-22 RX ADMIN — LIDOCAINE HYDROCHLORIDE 30 ML: 20 SOLUTION ORAL; TOPICAL at 16:09

## 2022-03-22 RX ADMIN — INSULIN ASPART 1 UNITS: 100 INJECTION, SOLUTION INTRAVENOUS; SUBCUTANEOUS at 20:26

## 2022-03-22 RX ADMIN — INSULIN DETEMIR 10 UNITS: 100 INJECTION, SOLUTION SUBCUTANEOUS at 22:29

## 2022-03-22 RX ADMIN — HYDROMORPHONE HYDROCHLORIDE 2 MG: 2 TABLET ORAL at 20:09

## 2022-03-22 RX ADMIN — IOPAMIDOL 96 ML: 755 INJECTION, SOLUTION INTRAVENOUS at 17:33

## 2022-03-22 RX ADMIN — SODIUM CHLORIDE 1000 ML: 900 INJECTION, SOLUTION INTRAVENOUS at 16:09

## 2022-03-22 RX ADMIN — SODIUM CHLORIDE: 900 INJECTION, SOLUTION INTRAVENOUS at 20:35

## 2022-03-22 RX ADMIN — SODIUM CHLORIDE 1000 ML: 900 INJECTION, SOLUTION INTRAVENOUS at 14:50

## 2022-03-22 RX ADMIN — HYDROMORPHONE HYDROCHLORIDE 0.5 MG: 1 INJECTION, SOLUTION INTRAMUSCULAR; INTRAVENOUS; SUBCUTANEOUS at 17:15

## 2022-03-22 ASSESSMENT — ACTIVITIES OF DAILY LIVING (ADL)
ADLS_ACUITY_SCORE: 10

## 2022-03-22 NOTE — ED TRIAGE NOTES
ED Triage Provider Note  [facility]  Encounter Date: Mar 22, 2022    History:  Chief Complaint   Patient presents with     Abdominal Pain     Andrew Lockwood is a 54 year old male who presents to the ED with complaint of worsening abdominal pain since starting antibiotics to treat SIBO 2 days ago.  Patient was recently hospitalized here for small bowel obstruction and started on antibiotics as well as other medications while inpatient, states he either was not given medications upon discharge other than the antibiotic or that the medications that were prescribed were the wrong ones so he has not been taking any other medication since discharge except for the antibiotic which she last took last evening.  Patient reports he had worsening heartburn, nausea and vomiting since discharge.  Has been unable to eat or drink.  Has not been having any bowel movements.  Denies any chest pain, shortness of breath, fever.    Review of Systems:  History of recent small bowel obstruction, HIV infection for which he has not had his normal medications for past 2 days    Exam:  BP (!) 151/98   Pulse 110   Temp 98.6  F (37  C)   Resp 16   Wt 71.2 kg (157 lb)   SpO2 98%   BMI 26.95 kg/m    General: No acute distress. Appears stated age.   Cardio: Regular rate, extremities well perfused  Resp: Normal work of breathing, grossly normal respiratory rate  Neuro: Alert. CN II-XII grossly intact. Grossly intact strength.   Abdomen: Generalized tenderness, guarding    Medical Decision Making:  Patient arriving to the ED with problem as above. A medical screening exam was performed. CBC, CMP, NS bolus, IV Pantoprazole 40mg, IV Zofran orders initiated from Triage.  Vascular access consult was initiated after 1 attempt at starting an IV which patient would not allow this writer to thread the catheter into the vein once the vein was punctured as he stated it was painful, and at his request a  vascular access consult was put in as he did not want this writer or another nurse attempting to place an IV on him at this time.  Patient also declined abdominal xray, states this is not a SBO. The patient is appropriate to wait in triage.        VIKASH Norton CNP on 3/22/2022 at 11:31 AM

## 2022-03-22 NOTE — ED TRIAGE NOTES
"General abd pain starting today with nausea and vomiting. Reports starting antibiotic for \"Stomach infection\". Denies fever.   "

## 2022-03-22 NOTE — CONSULTS
Colon and Rectal Surgery Consultation Note  [facility]    Andrew Lockwood MRN# [id number]   Age: 54 year old YOB: 1967     Date of Admission:  3/22/2022    Reason for consult: SBO       Requesting physician: Dr. Perez       Level of consult: Consult, follow and place orders           Assessment:   Andrew Lockwood is a 55 yo M with PMHx AIDS, T2DM (insulin), GERD and recurrent small bowel obstructions (last hospitalized 3/14-3/17 for SBO) who presented to the ED with increased abdominal pain, nausea, vomiting and CT evidence for recurrent small bowel obstruction.          Recommendations:     - Admit to medicine  - NPO, IVF  - Patient refusing NGT as this time, recommend re-adressing NGT if vomiting  - Replace electrolytes prn  - No acute surgical intervention tonight. Will monitor progress with conservative management and discuss potential need for surgery in the future depending on his progress    Plan discussed with Chief Dr. Santana who will discuss with staff.     Jennie Riojas DO  General Surgery PGY2           History of Present Illness:   CC: Abdominal pain    Andrew Lockwood is a 55 yo M with PMHx AIDS, T2DM (on insulin), GERD and recurrent small bowel obstructions who presented to the ED with abdominal pain, nausea, vomiting and CT evidence for recurrent small bowel obstruction.  He has had recurrent small bowel obstructions several times since about 2016. He has had 2 ex laps with lysis of adhesions in 2016 and 2018. These occurences have been increasing in frequency. His last admission for obstruction was just last week, 3/14 - 3/17 which was managed conservatively. Since his discharge he states he has continued to have symptoms of generalized abdominal pain and acid reflux. The pain worsened today and was associated with emesis around 0300 this AM. He had BM this morning. Unsure of the last time he passed flatus. He is frustrated with frequent obstructions and would like to  discuss possible surgical intervention in the future.          Past Medical History:     Past Medical History:   Diagnosis Date     AIDS (H)      Allergic rhinitis due to other allergen     DNS     Anal dysplasia      Chronic abdominal pain      CNS toxoplasmosis (H)      Diabetes type 2, controlled (H)      GERD (gastroesophageal reflux disease)      History of seizure      History of substance abuse (H)      HIV (human immunodeficiency virus infection) (H)      HLD (hyperlipidemia)      Lung nodules      Periungual wart      PTSD (post-traumatic stress disorder)      Sleep apnea     doesn't use CPAP             Past Surgical History:     Past Surgical History:   Procedure Laterality Date     ANOSCOPY N/A 9/9/2020    Procedure: Exam Under Anesthesia, ANOSCOPY, fulgeration of rectal fissures with Rectal Biopsies;  Surgeon: Thanh Lundberg MD;  Location: [facility] OR     COLONOSCOPY Left 1/22/2016    Procedure: COMBINED COLONOSCOPY, SINGLE OR MULTIPLE BIOPSY/POLYPECTOMY BY BIOPSY;  Surgeon: Clark Saini MD;  Location: [facility] GI     HC EXPLORE UNDESC TESTIS,INGUIN/SCROTAL       LAPAROSCOPIC APPENDECTOMY N/A 1/31/2018    Procedure: LAPAROSCOPIC APPENDECTOMY;  LAPAROSCOPIC APPENDECTOMY;  Surgeon: Dawn Holt MD;  Location: [facility] OR     LAPAROSCOPY DIAGNOSTIC (GENERAL) N/A 7/26/2016    Procedure: LAPAROSCOPY DIAGNOSTIC (GENERAL);  Surgeon: Susannah Arriaga MD;  Location: [facility] OR     LAPAROSCOPY DIAGNOSTIC (GENERAL) N/A 4/16/2018    Procedure: LAPAROSCOPY DIAGNOSTIC (GENERAL);  Diagnostic laparoscopy and lysis of adhesions;  Surgeon: Prince Dowling MD;  Location: [facility] OR     OPTICAL TRACKING SYSTEM CRANIOTOMY, EXCISE TUMOR, COMBINED Left 4/10/2015    Procedure: COMBINED OPTICAL TRACKING SYSTEM CRANIOTOMY, EXCISE TUMOR;  Surgeon: Mirlande Colmenares MD;  Location: [facility] OR     REPAIR GAMEKEEPER'S THUMB Right 12/2/2016    Procedure: REPAIR LIGAMENT ULNAR COLLATERAL THUMB (GAMEKEEPER'S);  Surgeon: Lona  Evin Baker MD;  Location: [facility] NONSPECIFIC PROCEDURE      right forearm fracture             Social History:     Social History     Socioeconomic History     Marital status: Single     Spouse name: Not on file     Number of children: Not on file     Years of education: Not on file     Highest education level: Not on file   Occupational History     Occupation:      Comment: 5 years; temp agency     Occupation: Unemployed   Tobacco Use     Smoking status: Current Every Day Smoker     Packs/day: 0.25     Years: 40.00     Pack years: 10.00     Types: Cigarettes     Smokeless tobacco: Former User   Substance and Sexual Activity     Alcohol use: No     Alcohol/week: 0.0 standard drinks     Comment: Last etoh in 2007     Drug use: Not Currently     Types: Marijuana, Methamphetamines     Sexual activity: Not Currently     Partners: Female, Male     Comment: Last sexual activity 2008   Other Topics Concern     Parent/sibling w/ CABG, MI or angioplasty before 65F 55M? Not Asked   Social History Narrative    Born in [facility] [facility].  Came to the [place] in 1993.  Last traveled to visit family in 2008.        1/7/2019 - Homeless. Working with a  at [employer]  trying to get housing. Sexually active with two partners recently using condoms 100% of the time. Smokes occasionally, not for the last 4 weeks.        1/7/2020 at [facility] since 1/1/2020. 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  Care established with ID and endocrine     Social Determinants of Health     Financial Resource Strain: Not on file   Food Insecurity: Not on file   Transportation Needs: Not on file   Physical Activity: Not on file   Stress: Not on file   Social Connections: Not on file   Intimate Partner Violence: Not on file   Housing Stability: Not on file             Family History:     Family History   Problem Relation Age of Onset     Diabetes Brother      Diabetes Father      Alzheimer Disease Father      Unknown/Adopted Mother       "Diabetes Paternal Grandfather      Cancer No family hx of         no skin cancer     Skin Cancer No family hx of         no famiy hx of skin cancer     Glaucoma No family hx of      Macular Degeneration No family hx of              Allergies:      Allergies   Allergen Reactions     Metformin      Abdominal pain     Tylenol [Acetaminophen] Itching     Dulaglutide Rash     Insulin Lispro Rash     Patient reported     Penicillin V Other (See Comments) and Rash     Diffuse maculopapular rash + feels \"high\", per pt.              Medications:   No current facility-administered medications on file prior to encounter.  bictegravir-emtricitabine-tenofovir (BIKTARVY) -25 MG per tablet, Take 1 tablet by mouth daily  ciprofloxacin (CIPRO) 500 MG tablet, Take 1 tablet (500 mg) by mouth 2 times daily  empagliflozin (JARDIANCE) 25 MG TABS tablet, Take 1 tablet (25 mg) by mouth daily  ibuprofen (ADVIL/MOTRIN) 400 MG tablet, Take 1 tablet (400 mg) by mouth every 4 hours as needed for moderate pain  insulin detemir (LEVEMIR PEN) 100 UNIT/ML pen, Inject 20 Units Subcutaneous 2 times daily  ondansetron (ZOFRAN-ODT) 4 MG ODT tab, Take 1 tablet (4 mg) by mouth every 6 hours as needed for nausea or vomiting  pantoprazole (PROTONIX) 40 MG EC tablet, Take 1 tablet (40 mg) by mouth daily  simethicone (MYLICON) 80 MG chewable tablet, Take 1 tablet (80 mg) by mouth every 6 hours as needed for cramping  Alcohol Swabs PADS, Use to swab the area of the injection or todd as directed Per insurance coverage  blood glucose (NO BRAND SPECIFIED) test strip, Use to test blood sugar 4 times daily or as directed. To accompany:whatever is covered  blood glucose calibration (NO BRAND SPECIFIED) solution, Used to calibrate the blood glucose monitor as needed and as directed.  To accompany  blood glucose brands per insurance coverage  blood glucose monitoring (NO BRAND SPECIFIED) meter device kit, Use as directed. Per insurance coverage.  glucose 40 % " (400 mg/mL) gel, 15 g every 15 minutes by mouth as needed for low blood sugar. Oral gel is preferable for conscious and able to swallow patient.  insulin pen needle (BD SCOTT U/F) 32G X 4 MM miscellaneous, USE 3 TO 4 NEEDLES DAILY  metFORMIN (GLUCOPHAGE-XR) 500 MG 24 hr tablet, Take 1 tablet (500 mg) by mouth daily (with dinner)  NOVOLOG FLEXPEN 100 UNIT/ML soln, Inject 5 Units Subcutaneous 3 times daily (with meals)  polyethylene glycol (MIRALAX) 17 GM/Dose powder, Take 17 g by mouth daily  SENNA-docusate sodium (SENNA S) 8.6-50 MG tablet, Take 1 tablet by mouth 2 times daily as needed (constipation)  Sharps Container MISC, Use as directed to dispose of needles, lancets and other sharps. Per Insurance coverage  STATIN NOT PRESCRIBED (INTENTIONAL), Please choose reason not prescribed, below  thin (NO BRAND SPECIFIED) lancets, Use with lanceting device. To accompany: Test 4 times daily with whatever is covered              Review of Systems:      All other review of systems negative, except for what is mentioned above        Physical Exam:   BP (!) 151/98   Pulse 110   Temp 98.6  F (37  C)   Resp 16   Wt 71.2 kg (157 lb)   SpO2 98%   BMI 26.95 kg/m    General: Alert, interactive, NAD  Resp: NLB on RA, no cough or wheeze  Cardiac: RRR  Abdomen: Soft, nontender to palpation, minimally distended, non tympanic, no rebound or gaurding  Extremities: No LE edema or obvious joint abnormalities  Skin: Warm and dry, no jaundice or rash  Neuro: A&Ox3, CN 2-12 intact, RIVAS            Data:     Lab Results   Component Value Date    WBC 10.3 03/22/2022    HGB 13.0 (L) 03/22/2022    HCT 39.9 (L) 03/22/2022     (H) 03/22/2022     03/22/2022    POTASSIUM 3.6 03/22/2022    CHLORIDE 106 03/22/2022    CO2 22 03/22/2022    BUN 17 03/22/2022    CR 0.75 03/22/2022     (H) 03/22/2022    SED 10 12/19/2020    DD  11/23/2016     <0.3  This D-dimer assay is intended for use in conjuntion with a clinical pretest    probability assessment model to exclude pulmonary embolism (PE) and as an aid   in the diagnosis of deep venous thrombosis (DVT) in outpatients suspected of PE   or DVT. The cut-off value is 0.5 g/mL FEU.      NTBNPI 68 11/11/2019    TROPONIN <0.015 10/30/2021    TROPI <0.015 06/24/2021    AST 10 03/22/2022    ALT 18 03/22/2022    ALKPHOS 123 03/22/2022    BILITOTAL 0.5 03/22/2022    INR 1.04 01/22/2022        CT Abdomen / Pelvis - Prelim read  RESIDENT PRELIMINARY INTERPRETATION  IMPRESSION:   1. Small bowel obstruction suspected as evidenced by a dilated segment  of small bowel in the left abdomen with a focal distal transposition  point identified (series 4 image 33). The proximal end of the dilated  loop tapers more gradually. No pneumatosis or pneumoperitoneum.  2. Cholelithiasis without evidence of acute cholecystitis.

## 2022-03-22 NOTE — ED PROVIDER NOTES
"    [facility] ([facility])  3/22/22  History     Chief Complaint   Patient presents with     Abdominal Pain     The history is provided by the patient and medical records.     Andrew Lockwood is a 54 year old male with a past medical history significant for recurrent SBO, T2DM, HIV/AIDS previously c/b CNS toxoplasmosis, and GERD who presents to the Emergency Department for evaluation of abdominal pain.  Patient reports he has had worsening abdominal pain since starting a new antibiotic regimen 2 days ago.  Epic records reviewed, he was prescribed ciprofloxacin 500 mg on 3/16/2022.  Patient reports he last took his antibiotic this morning.  He states that he has had worsening pain in the middle of his abdomen.  He reports this feels like \"acid\" and worsens whenever he takes the antibiotic or attempts to eat or drink.  He reports he has not been able to eat anything throughout the day today.  Patient is currently on Metformin as well as Jardiance.  He reports he has been taking these regularly, but has not taken his Metformin today as he has not been able to eat food today.  Patient reports he has not been taking omeprazole, Protonix, or any other antacids at home.  Patient did endorse some nausea earlier this morning which did improve with Zofran that was initiated in triage.  He reports no notable blood in his vomit and states the contents were just \"acid\".  Patient denies any diarrhea or constipation.  He reports his last bowel movement was this morning at 4 AM.  He states this was nonbloody.  Patient otherwise denies any chest pain or shortness of breath.  No fevers or other URI symptoms.  Patient states he does not feel as if this pain is similar to his SBO pain he had during his recent hospitalization.    Per review of the patient's medical record, he was recently admitted here to the [facility] from 3/14/2022-3/17/2022 for evaluation and management of SBO.  " Patient was noted to presented with abdominal pain and distention.  Patient did refuse NGT for decompression.  Pain did resolve after she was kept n.p.o. aside from medications of MiraLAX, which promoted a bowel movement on HC #1 and subsequent improvement in symptoms.  Ciprofloxacin was started before discharge on HD #2.    Past Medical History  Past Medical History:   Diagnosis Date     AIDS (H)      Allergic rhinitis due to other allergen     DNS     Anal dysplasia      Chronic abdominal pain      CNS toxoplasmosis (H)      Diabetes type 2, controlled (H)      GERD (gastroesophageal reflux disease)      History of seizure      History of substance abuse (H)      HIV (human immunodeficiency virus infection) (H)      HLD (hyperlipidemia)      Lung nodules      Periungual wart      PTSD (post-traumatic stress disorder)      Sleep apnea     doesn't use CPAP     Past Surgical History:   Procedure Laterality Date     ANOSCOPY N/A 9/9/2020    Procedure: Exam Under Anesthesia, ANOSCOPY, fulgeration of rectal fissures with Rectal Biopsies;  Surgeon: Thanh Lundberg MD;  Location: [facility] OR     COLONOSCOPY Left 1/22/2016    Procedure: COMBINED COLONOSCOPY, SINGLE OR MULTIPLE BIOPSY/POLYPECTOMY BY BIOPSY;  Surgeon: Clark Saini MD;  Location: [facility] GI     HC EXPLORE UNDESC TESTIS,INGUIN/SCROTAL       LAPAROSCOPIC APPENDECTOMY N/A 1/31/2018    Procedure: LAPAROSCOPIC APPENDECTOMY;  LAPAROSCOPIC APPENDECTOMY;  Surgeon: Dawn Holt MD;  Location: [facility] OR     LAPAROSCOPY DIAGNOSTIC (GENERAL) N/A 7/26/2016    Procedure: LAPAROSCOPY DIAGNOSTIC (GENERAL);  Surgeon: Susannah Arriaga MD;  Location: [facility] OR     LAPAROSCOPY DIAGNOSTIC (GENERAL) N/A 4/16/2018    Procedure: LAPAROSCOPY DIAGNOSTIC (GENERAL);  Diagnostic laparoscopy and lysis of adhesions;  Surgeon: Prince Dowling MD;  Location: [facility] OR     OPTICAL TRACKING SYSTEM CRANIOTOMY, EXCISE TUMOR, COMBINED Left 4/10/2015    Procedure: COMBINED OPTICAL  "TRACKING SYSTEM CRANIOTOMY, EXCISE TUMOR;  Surgeon: Mirlande Colmenares MD;  Location: [facility] OR     REPAIR GAMEKEEPER'S THUMB Right 12/2/2016    Procedure: REPAIR LIGAMENT ULNAR COLLATERAL THUMB (GAMEKEEPER'S);  Surgeon: Evin Zamorano MD;  Location:  OR     [facility] NONSPECIFIC PROCEDURE      right forearm fracture     bictegravir-emtricitabine-tenofovir (BIKTARVY) -25 MG per tablet  ciprofloxacin (CIPRO) 500 MG tablet  empagliflozin (JARDIANCE) 25 MG TABS tablet  ibuprofen (ADVIL/MOTRIN) 400 MG tablet  insulin detemir (LEVEMIR PEN) 100 UNIT/ML pen  ondansetron (ZOFRAN-ODT) 4 MG ODT tab  pantoprazole (PROTONIX) 40 MG EC tablet  simethicone (MYLICON) 80 MG chewable tablet  Alcohol Swabs PADS  blood glucose (NO BRAND SPECIFIED) test strip  blood glucose calibration (NO BRAND SPECIFIED) solution  blood glucose monitoring (NO BRAND SPECIFIED) meter device kit  glucose 40 % (400 mg/mL) gel  insulin pen needle (BD SCOTT U/F) 32G X 4 MM miscellaneous  metFORMIN (GLUCOPHAGE-XR) 500 MG 24 hr tablet  NOVOLOG FLEXPEN 100 UNIT/ML soln  polyethylene glycol (MIRALAX) 17 GM/Dose powder  SENNA-docusate sodium (SENNA S) 8.6-50 MG tablet  Sharps Container MISC  STATIN NOT PRESCRIBED (INTENTIONAL)  thin (NO BRAND SPECIFIED) lancets      Allergies   Allergen Reactions     Metformin      Abdominal pain     Tylenol [Acetaminophen] Itching     Dulaglutide Rash     Insulin Lispro Rash     Patient reported     Penicillin V Other (See Comments) and Rash     Diffuse maculopapular rash + feels \"high\", per pt.      Family History  Family History   Problem Relation Age of Onset     Diabetes Brother      Diabetes Father      Alzheimer Disease Father      Unknown/Adopted Mother      Diabetes Paternal Grandfather      Cancer No family hx of         no skin cancer     Skin Cancer No family hx of         no famiy hx of skin cancer     Glaucoma No family hx of      Macular Degeneration No family hx of      Social History   Social History "     Tobacco Use     Smoking status: Current Every Day Smoker     Packs/day: 0.25     Years: 40.00     Pack years: 10.00     Types: Cigarettes     Smokeless tobacco: Former User   Substance Use Topics     Alcohol use: No     Alcohol/week: 0.0 standard drinks     Comment: Last etoh in 2007     Drug use: Not Currently     Types: Marijuana, Methamphetamines      Past medical history, past surgical history, medications, allergies, family history, and social history were reviewed with the patient. No additional pertinent items.     I have reviewed the Medications, Allergies, Past Medical and Surgical History, and Social History in the Epic system.    Review of Systems  A complete review of systems was performed with pertinent positives and negatives noted in the HPI, and all other systems negative.    Physical Exam   BP: (!) 151/98  Pulse: 110  Temp: 98.6  F (37  C)  Resp: 16  Weight: 71.2 kg (157 lb)  SpO2: 98 %      Physical Exam  Vitals and nursing note reviewed.   Constitutional:       General: He is not in acute distress.  HENT:      Head: Normocephalic and atraumatic.   Eyes:      Extraocular Movements: Extraocular movements intact.      Pupils: Pupils are equal, round, and reactive to light.   Cardiovascular:      Rate and Rhythm: Normal rate and regular rhythm.      Pulses: Normal pulses.      Heart sounds: Normal heart sounds. No murmur heard.  Pulmonary:      Effort: Pulmonary effort is normal. No respiratory distress.      Breath sounds: Normal breath sounds. No stridor. No wheezing or rales.   Abdominal:      General: There is no distension.      Palpations: Abdomen is soft. There is no mass.      Tenderness: There is abdominal tenderness. There is no guarding or rebound.      Comments: Left sided tenderness   Musculoskeletal:         General: Normal range of motion.      Cervical back: Normal range of motion and neck supple.   Skin:     General: Skin is warm and dry.      Capillary Refill: Capillary refill  Patient seen and examined. I encouraged him to use the incentive spirometer. I encouraged him to ambulate.   He is progressing as expected. His CXR shows atelectasis of the right lung when compared immediate postop CXR.     OR cultures are negative. takes less than 2 seconds.      Findings: No rash.   Neurological:      General: No focal deficit present.      Mental Status: He is alert and oriented to person, place, and time.      GCS: GCS eye subscore is 4. GCS verbal subscore is 5. GCS motor subscore is 6.      Cranial Nerves: No cranial nerve deficit.      Sensory: No sensory deficit.      Motor: No weakness or abnormal muscle tone.   Psychiatric:         Mood and Affect: Mood normal.       ED Course     At 3:20 PM the patient was seen and examined by Mirian Perez MD in Room [facility].     Procedures         EKG Interpretation:      Interpreted by Mirian Perez MD  Time reviewed: 5 pm  Symptoms at time of EKG: upper abdominal pain   Rhythm: normal sinus   Rate: normal  Axis: normal  Ectopy: none  Conduction: normal  ST Segments/ T Waves: No ST-T wave changes  Q Waves: none  Comparison to prior: Unchanged    Clinical Impression: no evidence of acute ischemia           The medical record was reviewed and interpreted.  Current labs reviewed and interpreted.  Current images reviewed and interpreted: as below.  EKG reviewed and interpreted: as above.     Labs Ordered and Resulted from Time of ED Arrival to Time of ED Departure   COMPREHENSIVE METABOLIC PANEL - Abnormal       Result Value    Sodium 136      Potassium 3.6      Chloride 106      Carbon Dioxide (CO2) 22      Anion Gap 8      Urea Nitrogen 17      Creatinine 0.75      Calcium 9.4      Glucose 227 (*)     Alkaline Phosphatase 123      AST 10      ALT 18      Protein Total 8.2      Albumin 3.7      Bilirubin Total 0.5      GFR Estimate >90     CBC WITH PLATELETS AND DIFFERENTIAL - Abnormal    WBC Count 10.3      RBC Count 4.66      Hemoglobin 13.0 (*)     Hematocrit 39.9 (*)     MCV 86      MCH 27.9      MCHC 32.6      RDW 13.0      Platelet Count 460 (*)     % Neutrophils 57      % Lymphocytes 33      % Monocytes 6      % Eosinophils 2      % Basophils 1      % Immature Granulocytes 1       NRBCs per 100 WBC 0      Absolute Neutrophils 5.9      Absolute Lymphocytes 3.5      Absolute Monocytes 0.6      Absolute Eosinophils 0.2      Absolute Basophils 0.1      Absolute Immature Granulocytes 0.1      Absolute NRBCs 0.0     GLUCOSE BY METER - Abnormal    GLUCOSE BY METER POCT 142 (*)    GLUCOSE BY METER - Abnormal    GLUCOSE BY METER POCT 148 (*)    GLUCOSE BY METER - Abnormal    GLUCOSE BY METER POCT 122 (*)    LIPASE - Normal    Lipase 137     COVID-19 VIRUS (CORONAVIRUS) BY PCR - Normal    SARS CoV2 PCR Negative       CT Abdomen Pelvis w Contrast   Final Result   IMPRESSION:    1. Small bowel obstruction suspected as evidenced by a dilated segment   of small bowel in the left abdomen with a focal distal transposition   point identified (series 4 image 33). The proximal end of the dilated   loop tapers more gradually. No pneumatosis or pneumoperitoneum.   2. Cholelithiasis without evidence of acute cholecystitis.      I have personally reviewed the examination and initial interpretation   and I agree with the findings.      ADINA JACKMAN MD            SYSTEM ID:  [id number]      Abdomen XR, 2 vw, flat and upright   Final Result   IMPRESSION:    Nonspecific paucity of small bowel gas without pneumatosis or free   air.      I have personally reviewed the examination and initial interpretation   and I agree with the findings.      MARY ROMERO MD            SYSTEM ID:  [id number]          Medications   0.9% sodium chloride BOLUS (1,000 mLs Intravenous New Bag 3/22/22 1450)     Followed by   sodium chloride 0.9% infusion (has no administration in time range)   ondansetron (ZOFRAN) injection 4 mg (4 mg Intravenous Given 3/22/22 1450)   pantoprazole (PROTONIX) IV push injection 40 mg (40 mg Intravenous Given 3/22/22 1450)          Assessments & Plan (with Medical Decision Making)   Patient with prior history of bowel obstructions who is well-known to the emergency department presents for worsening abdominal  pain.  Was recently placed on ciprofloxacin related to the thought of him having small intestinal bacterial overgrowth.  Now with worsening abdominal pain.  He does have tenderness on the left side of the abdomen without signs of peritonitis.  He is unfortunately had multiple imaging studies in the past and discussed with the patient first obtaining x-ray as he did have a bowel movement this morning and he did not feel this felt similar to his prior bowel obstructions.  Did still consider obstruction as well as constipation, gastritis, ulcer, pancreatitis, diverticulitis, etc.  Started with two-view abdominal x-ray and laboratory studies.  With his upper abdominal pain did obtain EKG which was without evidence of acute ischemia.  CBC reveals a white blood cell count of 10.3 with hemoglobin of 13.  CMP is largely unremarkable with a glucose of 227 without signs of DKA.  Lipase within normal limits.  Urinalysis without signs of UTI or hematuria.  She was given Protonix Zofran and GI cocktail here as well as IV fluids.    X-ray revealed nonspecific positive small bowel gas without pneumatosis or free air.  With this not being an entirely normal x-ray and on reevaluation the patient was still having pain and thus we discussed obtaining CT of the abdomen and pelvis which she was in agreement with.  He was given Dilaudid for pain control as his pain was worsening. CT reveals small bowel obstruction suspected as evidenced by dilated segment of small bowel in the left abdomen with a focal distal transposition point identified.  The proximal end of the dilated loop tapers more gradually.  No pneumatosis or pneumoperitoneum.  Cholelithiasis without evidence of acute cholecystitis.  Spoke to general surgery who will evaluate the patient.  Spoke with the medicine team for admission.  They accepted the patient.  Patient was in agreement with this plan.  He was admitted in stable condition.    I have reviewed the nursing  notes.    I have reviewed the findings, diagnosis, plan and need for follow up with the patient.    New Prescriptions    No medications on file       Final diagnoses:   Small bowel obstruction (H)       I, Ruel Vasquez am serving as a trained medical scribe to document services personally performed by Mirian Perez MD, based on the provider's statements to me.      I, Mirian Perez MD, was physically present and have reviewed and verified the accuracy of this note documented by Ruel Vasquez.     Mirian Perez MD  3/22/2022   [facility] EMERGENCY DEPARTMENT     Mirian Perez MD  05/30/22 1114

## 2022-03-23 ENCOUNTER — APPOINTMENT (OUTPATIENT)
Dept: GENERAL RADIOLOGY | Facility: CLINIC | Age: 57
End: 2022-03-23
Payer: COMMERCIAL

## 2022-03-23 LAB
ALBUMIN SERPL-MCNC: 3 G/DL (ref 3.4–5)
ALBUMIN UR-MCNC: NEGATIVE MG/DL
ALP SERPL-CCNC: 97 U/L (ref 40–150)
ALT SERPL W P-5'-P-CCNC: 13 U/L (ref 0–70)
ANION GAP SERPL CALCULATED.3IONS-SCNC: 7 MMOL/L (ref 3–14)
APPEARANCE UR: CLEAR
AST SERPL W P-5'-P-CCNC: 10 U/L (ref 0–45)
ATRIAL RATE - MUSE: 83 BPM
BILIRUB SERPL-MCNC: 0.6 MG/DL (ref 0.2–1.3)
BILIRUB UR QL STRIP: NEGATIVE
BUN SERPL-MCNC: 11 MG/DL (ref 7–30)
CALCIUM SERPL-MCNC: 8.8 MG/DL (ref 8.5–10.1)
CD3 CELLS # BLD: 1578 CELLS/UL (ref 603–2990)
CD3 CELLS NFR BLD: 65 % (ref 49–84)
CD3+CD4+ CELLS # BLD: 489 CELLS/UL (ref 441–2156)
CD3+CD4+ CELLS NFR BLD: 20 % (ref 28–63)
CD3+CD4+ CELLS/CD3+CD8+ CLL BLD: 0.47 % (ref 1.4–2.6)
CD3+CD8+ CELLS # BLD: 1031 CELLS/UL (ref 125–1312)
CD3+CD8+ CELLS NFR BLD: 42 % (ref 10–40)
CHLORIDE BLD-SCNC: 110 MMOL/L (ref 94–109)
CO2 SERPL-SCNC: 19 MMOL/L (ref 20–32)
COLOR UR AUTO: ABNORMAL
CREAT SERPL-MCNC: 0.71 MG/DL (ref 0.66–1.25)
DIASTOLIC BLOOD PRESSURE - MUSE: NORMAL MMHG
ERYTHROCYTE [DISTWIDTH] IN BLOOD BY AUTOMATED COUNT: 13.1 % (ref 10–15)
GFR SERPL CREATININE-BSD FRML MDRD: >90 ML/MIN/1.73M2
GLUCOSE BLD-MCNC: 129 MG/DL (ref 70–99)
GLUCOSE BLDC GLUCOMTR-MCNC: 110 MG/DL (ref 70–99)
GLUCOSE BLDC GLUCOMTR-MCNC: 113 MG/DL (ref 70–99)
GLUCOSE BLDC GLUCOMTR-MCNC: 122 MG/DL (ref 70–99)
GLUCOSE BLDC GLUCOMTR-MCNC: 122 MG/DL (ref 70–99)
GLUCOSE BLDC GLUCOMTR-MCNC: 128 MG/DL (ref 70–99)
GLUCOSE BLDC GLUCOMTR-MCNC: 90 MG/DL (ref 70–99)
GLUCOSE BLDC GLUCOMTR-MCNC: 98 MG/DL (ref 70–99)
GLUCOSE UR STRIP-MCNC: >=1000 MG/DL
HCT VFR BLD AUTO: 35.8 % (ref 40–53)
HGB BLD-MCNC: 11.4 G/DL (ref 13.3–17.7)
HGB UR QL STRIP: NEGATIVE
INTERPRETATION ECG - MUSE: NORMAL
KETONES UR STRIP-MCNC: 40 MG/DL
LEUKOCYTE ESTERASE UR QL STRIP: NEGATIVE
MAGNESIUM SERPL-MCNC: 2 MG/DL (ref 1.6–2.3)
MCH RBC QN AUTO: 27.7 PG (ref 26.5–33)
MCHC RBC AUTO-ENTMCNC: 31.8 G/DL (ref 31.5–36.5)
MCV RBC AUTO: 87 FL (ref 78–100)
MUCOUS THREADS #/AREA URNS LPF: PRESENT /LPF
NITRATE UR QL: NEGATIVE
P AXIS - MUSE: 49 DEGREES
PH UR STRIP: 5 [PH] (ref 5–7)
PLATELET # BLD AUTO: 378 10E3/UL (ref 150–450)
POTASSIUM BLD-SCNC: 3.5 MMOL/L (ref 3.4–5.3)
PR INTERVAL - MUSE: 122 MS
PROT SERPL-MCNC: 6.9 G/DL (ref 6.8–8.8)
QRS DURATION - MUSE: 80 MS
QT - MUSE: 372 MS
QTC - MUSE: 437 MS
R AXIS - MUSE: -26 DEGREES
RBC # BLD AUTO: 4.12 10E6/UL (ref 4.4–5.9)
RBC URINE: <1 /HPF
SODIUM SERPL-SCNC: 136 MMOL/L (ref 133–144)
SP GR UR STRIP: 1.03 (ref 1–1.03)
SYSTOLIC BLOOD PRESSURE - MUSE: NORMAL MMHG
T AXIS - MUSE: -11 DEGREES
T CELL COMMENT: ABNORMAL
UROBILINOGEN UR STRIP-MCNC: NORMAL MG/DL
VENTRICULAR RATE- MUSE: 83 BPM
WBC # BLD AUTO: 7.6 10E3/UL (ref 4–11)
WBC URINE: <1 /HPF

## 2022-03-23 PROCEDURE — 86360 T CELL ABSOLUTE COUNT/RATIO: CPT | Performed by: PHYSICIAN ASSISTANT

## 2022-03-23 PROCEDURE — 85027 COMPLETE CBC AUTOMATED: CPT | Performed by: PHYSICIAN ASSISTANT

## 2022-03-23 PROCEDURE — 99207 PR APP CREDIT; MD BILLING SHARED VISIT: CPT | Performed by: PHYSICIAN ASSISTANT

## 2022-03-23 PROCEDURE — 258N000003 HC RX IP 258 OP 636: Performed by: NURSE PRACTITIONER

## 2022-03-23 PROCEDURE — 83735 ASSAY OF MAGNESIUM: CPT | Performed by: PHYSICIAN ASSISTANT

## 2022-03-23 PROCEDURE — 250N000013 HC RX MED GY IP 250 OP 250 PS 637: Performed by: PHYSICIAN ASSISTANT

## 2022-03-23 PROCEDURE — 36415 COLL VENOUS BLD VENIPUNCTURE: CPT | Performed by: PHYSICIAN ASSISTANT

## 2022-03-23 PROCEDURE — 250N000011 HC RX IP 250 OP 636: Performed by: PHYSICIAN ASSISTANT

## 2022-03-23 PROCEDURE — 80053 COMPREHEN METABOLIC PANEL: CPT | Performed by: PHYSICIAN ASSISTANT

## 2022-03-23 PROCEDURE — 81003 URINALYSIS AUTO W/O SCOPE: CPT | Performed by: EMERGENCY MEDICINE

## 2022-03-23 PROCEDURE — 74018 RADEX ABDOMEN 1 VIEW: CPT | Mod: 26 | Performed by: RADIOLOGY

## 2022-03-23 PROCEDURE — 120N000002 HC R&B MED SURG/OB UMMC

## 2022-03-23 PROCEDURE — 74018 RADEX ABDOMEN 1 VIEW: CPT

## 2022-03-23 PROCEDURE — C9113 INJ PANTOPRAZOLE SODIUM, VIA: HCPCS | Performed by: PHYSICIAN ASSISTANT

## 2022-03-23 PROCEDURE — 99231 SBSQ HOSP IP/OBS SF/LOW 25: CPT | Mod: GC | Performed by: SURGERY

## 2022-03-23 PROCEDURE — 99233 SBSQ HOSP IP/OBS HIGH 50: CPT | Performed by: PEDIATRICS

## 2022-03-23 PROCEDURE — 250N000011 HC RX IP 250 OP 636: Performed by: STUDENT IN AN ORGANIZED HEALTH CARE EDUCATION/TRAINING PROGRAM

## 2022-03-23 RX ORDER — POTASSIUM CHLORIDE 750 MG/1
20 TABLET, EXTENDED RELEASE ORAL ONCE
Status: COMPLETED | OUTPATIENT
Start: 2022-03-23 | End: 2022-03-23

## 2022-03-23 RX ORDER — CEFAZOLIN SODIUM 2 G/100ML
2 INJECTION, SOLUTION INTRAVENOUS SEE ADMIN INSTRUCTIONS
Status: CANCELLED | OUTPATIENT
Start: 2022-03-23

## 2022-03-23 RX ORDER — KETOROLAC TROMETHAMINE 30 MG/ML
30 INJECTION, SOLUTION INTRAMUSCULAR; INTRAVENOUS ONCE
Status: COMPLETED | OUTPATIENT
Start: 2022-03-23 | End: 2022-03-23

## 2022-03-23 RX ORDER — CIPROFLOXACIN 2 MG/ML
400 INJECTION, SOLUTION INTRAVENOUS EVERY 12 HOURS
Status: DISCONTINUED | OUTPATIENT
Start: 2022-03-23 | End: 2022-03-24 | Stop reason: HOSPADM

## 2022-03-23 RX ORDER — CEFAZOLIN SODIUM 2 G/100ML
2 INJECTION, SOLUTION INTRAVENOUS
Status: CANCELLED | OUTPATIENT
Start: 2022-03-23

## 2022-03-23 RX ADMIN — KETOROLAC TROMETHAMINE 30 MG: 30 INJECTION, SOLUTION INTRAMUSCULAR at 21:47

## 2022-03-23 RX ADMIN — SODIUM CHLORIDE: 900 INJECTION, SOLUTION INTRAVENOUS at 22:22

## 2022-03-23 RX ADMIN — POTASSIUM CHLORIDE 20 MEQ: 750 TABLET, EXTENDED RELEASE ORAL at 11:23

## 2022-03-23 RX ADMIN — HYDROMORPHONE HYDROCHLORIDE 4 MG: 2 TABLET ORAL at 18:10

## 2022-03-23 RX ADMIN — SODIUM CHLORIDE: 900 INJECTION, SOLUTION INTRAVENOUS at 14:07

## 2022-03-23 RX ADMIN — HYDROMORPHONE HYDROCHLORIDE 4 MG: 2 TABLET ORAL at 13:22

## 2022-03-23 RX ADMIN — DIATRIZOATE MEGLUMINE AND DIATRIZOATE SODIUM 75 ML: 660; 100 SOLUTION ORAL; RECTAL at 08:39

## 2022-03-23 RX ADMIN — BICTEGRAVIR SODIUM, EMTRICITABINE, AND TENOFOVIR ALAFENAMIDE FUMARATE 1 TABLET: 50; 200; 25 TABLET ORAL at 08:35

## 2022-03-23 RX ADMIN — HYDROMORPHONE HYDROCHLORIDE 2 MG: 2 TABLET ORAL at 09:25

## 2022-03-23 RX ADMIN — CIPROFLOXACIN 400 MG: 2 INJECTION, SOLUTION INTRAVENOUS at 11:23

## 2022-03-23 RX ADMIN — PANTOPRAZOLE SODIUM 40 MG: 40 INJECTION, POWDER, FOR SOLUTION INTRAVENOUS at 08:35

## 2022-03-23 RX ADMIN — CIPROFLOXACIN 400 MG: 2 INJECTION, SOLUTION INTRAVENOUS at 23:28

## 2022-03-23 RX ADMIN — HYDROMORPHONE HYDROCHLORIDE 0.5 MG: 1 INJECTION, SOLUTION INTRAMUSCULAR; INTRAVENOUS; SUBCUTANEOUS at 22:19

## 2022-03-23 RX ADMIN — HYDROMORPHONE HYDROCHLORIDE 0.3 MG: 1 INJECTION, SOLUTION INTRAMUSCULAR; INTRAVENOUS; SUBCUTANEOUS at 08:26

## 2022-03-23 RX ADMIN — HYDROMORPHONE HYDROCHLORIDE 0.3 MG: 1 INJECTION, SOLUTION INTRAMUSCULAR; INTRAVENOUS; SUBCUTANEOUS at 20:05

## 2022-03-23 RX ADMIN — SODIUM CHLORIDE: 900 INJECTION, SOLUTION INTRAVENOUS at 06:05

## 2022-03-23 ASSESSMENT — ACTIVITIES OF DAILY LIVING (ADL)
ADLS_ACUITY_SCORE: 5
DIFFICULTY_COMMUNICATING: NO
ADLS_ACUITY_SCORE: 10
DIFFICULTY_EATING/SWALLOWING: NO
DRESSING/BATHING_DIFFICULTY: NO
ADLS_ACUITY_SCORE: 10
ADLS_ACUITY_SCORE: 5
WALKING_OR_CLIMBING_STAIRS_DIFFICULTY: NO
ADLS_ACUITY_SCORE: 5
ADLS_ACUITY_SCORE: 5
ADLS_ACUITY_SCORE: 10
ADLS_ACUITY_SCORE: 5
TOILETING_ISSUES: NO
ADLS_ACUITY_SCORE: 5
WEAR_GLASSES_OR_BLIND: NO
CONCENTRATING,_REMEMBERING_OR_MAKING_DECISIONS_DIFFICULTY: NO
ADLS_ACUITY_SCORE: 10
ADLS_ACUITY_SCORE: 5
CHANGE_IN_FUNCTIONAL_STATUS_SINCE_ONSET_OF_CURRENT_ILLNESS/INJURY: NO
DOING_ERRANDS_INDEPENDENTLY_DIFFICULTY: NO
ADLS_ACUITY_SCORE: 5
FALL_HISTORY_WITHIN_LAST_SIX_MONTHS: NO

## 2022-03-23 NOTE — PLAN OF CARE
Goal Outcome Evaluation:    Plan of Care Reviewed With: patient     Outcome Evaluation: Continues to be NPO and remains on IVF infusing at 125 ml/hr. Completed Gastrografin challenge today  Provider from surgery was here. To continue with NPO status and will be reevaluated in the morning. Blood sugar 113 at 1830.  /83 (BP Location: Left arm)   Pulse 88   Temp 98.6  F (37  C) (Oral)   Resp 20   Wt 70.5 kg (155 lb 8 oz)   SpO2 98%   BMI 26.69 kg/m

## 2022-03-23 NOTE — PROGRESS NOTES
General Surgery Progress Note    No acute events overnight. Abdominal pain worsening, mostly in the lower bl quadrants. No n/v. No flatus or BM. Still refusing NGT. Says he is hungry.     BP (!) 145/89 (BP Location: Left arm)   Pulse 90   Temp 98.5  F (36.9  C) (Oral)   Resp 16   Wt 71.2 kg (157 lb)   SpO2 99%   BMI 26.95 kg/m      NAD, Aox4  NLB on RA, no cough or wheeze  RRR, no cyanosis  Abdomen soft, minimally distended, non-tender to palpation, no rebound or guarding  Extremities WWP, RIVAS, SILT     No new labs or imaging    A/P  Andrew Lockwood is a 53 yo M with PMHx AIDS, T2DM (insulin), GERD and recurrent small bowel obstructions (last hospitalized 3/14-3/17 for SBO) who presented to the ED with increased abdominal pain, nausea, vomiting and CT evidence for recurrent small bowel obstruction. Andrew Lockwood is a 53 yo M with PMHx AIDS, T2DM (insulin), GERD and recurrent small bowel obstructions (last hospitalized 3/14-3/17 for SBO) who presented to the ED with increased abdominal pain, nausea, vomiting and CT evidence for recurrent small bowel obstruction. 3/22/22 with repeat SBO.    - NPO, IVF  - Patient refusing NGT as this time, recommend re-adressing NGT if vomiting  - Replace electrolytes prn   - Gastrografin challenge, PO today. If he fails the GGC, we will consider operative mangement / lysis of adhesions in coming days     Seen and discussed with Chief, Dr. Verde.      Jennie Riojas DO  General Surgery PGY2

## 2022-03-23 NOTE — PROGRESS NOTES
[facility]    Medicine Progress Note - Hospitalist Service, GOLD TEAM 6    Date of Admission:  3/22/2022    Assessment & Plan        Andrew Lockwood is a 54 year old male with PMHx of IDDM2, GERD, HIV c/b CNS toxoplasmosis, s/p appendectomy, ex lap w/ lysis of adhesions (2016, 2018), recurrent SBOs with recent admission 3/14-3/17/22 for recurrent SBO, who presented with abdominal pain admitted on 3/22/2022, for recurrent SBO, with general surgery consultation.     # Recurrent SBO-  Presented w/ ~ 13 hour hx of acute onset abdominal pain w/ mild nausea, no emesis. No fever, chills, or dysuria. Passing flatus and had formed BM this AM.  Recent admission 3/14-3/17 for same w/ conservative management. Patient has refused NGT placement in past. CT A/P w/ contrast: SBO suspected as evidenced by a dilated segment of small bowel in the left abdomen w/ focal transition point; no pneumatosis or pneumoperitoneum; cholelithiasis w/o evidence of scute cholecystitis. LFT's wnl, Lipase 137.   - Appreciate Surgery recommendations and assistance  - NG if symptomatic  - NPO w/ exception of ice chips and meds  - Zofran for nausea  - NS  ml/hr maintenance   - Pain control w/ Dilaudid 2-4 mg SL q4h PRN w/ IV 0.3-0.5 mg q2h PRN for breakthrough    - Gastrografin challenge today. If falls GGC surgery will consider operative management in coming days   - Replete electrolytes K >4.0 and Mg > 2.0     # Posssible SIBO: Started on cipro 500 mg BID on recent admission to complete 14 day course for possible SIBO (start date 3/16)  - Continue Cipro 400 mg IV q12h for SIBO     # Poorly controlled DMII: A1c 11.2 (2/2022). BG mildly elevated on admission. PTA jaridance, metformin (both PO medications recently started on admission 3/14/22), Levemir 20 units BID, and novolog.   - Continue Levemir, decrease to 10 units BID  - MSSI q4h  - Hold Jardiance and metformin  - Hypoglycemia protocol    # HIV:  "Continue PTA Biktarvy -25 mg daily. Recent CD4 count 371 on 1/2022. Per ID if CD4 count >200 no need for ppx. Will repeat CD4 count.     # GERD: PTA Protonix, will continue with IV Protonix while NPO     # Housing Insecurity: Currently lives in a shelter in [place]. Has declined SW consults in past.        Diet: NPO for Medical/Clinical Reasons Except for: Meds, Ice Chips    DVT Prophylaxis: Pneumatic Compression Devices  Potts Catheter: Not present  Central Lines: None  Cardiac Monitoring: None  Code Status: Full Code      Disposition Plan   Expected Discharge: 03/26/2022     Anticipated discharge location:  Awaiting care coordination huddle  Delays:           The patient's care was discussed with the Attending Physician, Dr. Jun Orozco MD, Bedside Nurse and Patient.    Lou Canela PA-C  Hospitalist Service, [address]  Securely message with the Vocera Web Console (learn more here)  Text page via [facility] Paging/Directory   Please see signed in provider for up to date coverage information      Clinically Significant Risk Factors Present on Admission             # Hypoalbuminemia: Albumin = 3.0 g/dL (Ref range: 3.4 - 5.0 g/dL) on admission, will monitor as appropriate      # Diabetes, type II: last A1C 11.2 % (Ref range: 0.0 - 5.6 %)  # Overweight: Estimated body mass index is 26.95 kg/m  as calculated from the following:    Height as of 3/14/22: 1.626 m (5' 4\").    Weight as of this encounter: 71.2 kg (157 lb).      ______________________________________________________________________    Interval History   Patient reports having abdominal pain that comes and goes, cramping in nature, currently at 9/10 in severity. Feels the dilaudid is controlling the pain. Denies any N/V. Last BM was this morning at 4AM, formed and brown. Denies any blood but states he hasn't really looked closely. Denies any F/C, CP, SOB, urinary symptoms, or night sweats. " Reports a few point weight loss 2/2 multiple admissions and not eating 2/2 bowel obstructions.     States he has been compliant with all medications including anti-virals and diabetes medications.     Data reviewed today: I reviewed all medications, new labs and imaging results over the last 24 hours.     Physical Exam   Vital Signs: Temp: 98.5  F (36.9  C) Temp src: Oral BP: (!) 145/89 Pulse: 90   Resp: 16 SpO2: 99 % O2 Device: None (Room air)    Weight: 157 lbs 0 oz  GENERAL: Alert and awake. Answering questions appropriately.  NAD. Pleasant and conversational  HEENT: AT/NC. Anicteric sclera.  Mucous membranes moist   CARDIOVASCULAR: RRR. S1, S2. No murmurs, rubs, or gallops.   RESPIRATORY: Effort normal on RA. Clear to auscultation bilaterally, no rales, rhonchi or wheezes, respirations unlabored   GI: Abdomen soft, TTP in LLQ abdomen with involuntary guarding, but no rebound tenderness or peritoneal signs, Hypoactive BS, with occasional High pitched bowel sounds present  EXTREMITIES: No peripheral edema.  NEUROLOGICAL:CN II-XII grossly intact. Moving all extremities symmetrically.   SKIN: Intact. Warm and dry. No jaundice.     Data   Recent Labs   Lab 03/23/22  0834 03/23/22  0615 03/23/22  0356 03/22/22 2023 03/22/22  1417 03/17/22  0653 03/17/22  0617   WBC  --  7.6  --   --  10.3  --  6.0   HGB  --  11.4*  --   --  13.0*  --  11.9*   MCV  --  87  --   --  86  --  85   PLT  --  378  --   --  460*  --  349   NA  --  136  --   --  136  --  137   POTASSIUM  --  3.5  --   --  3.6  --  3.4   CHLORIDE  --  110*  --   --  106  --  104   CO2  --  19*  --   --  22  --  28   BUN  --  11  --   --  17  --  10   CR  --  0.71  --   --  0.75  --  0.61*   ANIONGAP  --  7  --   --  8  --  5   LINDA  --  8.8  --   --  9.4  --  9.2   * 129* 122*   < > 227*   < > 140*   ALBUMIN  --  3.0*  --   --  3.7  --  3.0*   PROTTOTAL  --  6.9  --   --  8.2  --  7.0   BILITOTAL  --  0.6  --   --  0.5  --  0.4   ALKPHOS  --  97  --   --   123  --  114   ALT  --  13  --   --  18  --  14   AST  --  10  --   --  10  --  9   LIPASE  --   --   --   --  137  --   --     < > = values in this interval not displayed.     Recent Results (from the past 24 hour(s))   Abdomen XR, 2 vw, flat and upright    Narrative    EXAM: XR ABDOMEN 2VIEWS 3/22/2022 4:00 PM     HISTORY: Abdominal pain, hx of SBO, eval for obstruction, free air       COMPARISON: 3/14/2022    FINDINGS:   Upright and supine AP views of the abdomen. Nonspecific paucity of  small bowel gas. Moderate stool burden. No pneumatosis, portal venous  gas, or free air. Left pelvic reservoir. The included osseous  structures and soft tissues are within normal limits. The lung bases  are clear.        Impression    IMPRESSION:   Nonspecific paucity of small bowel gas without pneumatosis or free  air.    I have personally reviewed the examination and initial interpretation  and I agree with the findings.    MARY ROMERO MD         SYSTEM ID:  [id number]   CT Abdomen Pelvis w Contrast    Narrative    EXAMINATION: CT ABDOMEN PELVIS W CONTRAST, 3/22/2022 5:40 PM    INDICATION: abdominal pain, worsening despite intervention here, hx of  SBO, x-ray with paucity of small bowel gas, eval for SBO    COMPARISON STUDY: Same day abdomen radiographs, CT 2/19/2022    TECHNIQUE: CT scan of the abdomen and pelvis was performed on  multidetector CT scanner using volumetric acquisition technique and  images were reconstructed in multiple planes with variable thickness  and reviewed on dedicated workstations.     CONTRAST: iopamidol (ISOVUE-370) solution 96 mL injected IV without  oral contrast    CT scan radiation dose is optimized to minimum requisite dose using  automated dose modulation techniques.    FINDINGS:    The lung bases are clear. Small hiatal hernia.    The liver, spleen, pancreas, adrenals and kidneys have a normal  appearance. Tiny calcified stone in the gallbladder without  gallbladder wall thickening or  pericholecystic fluid. No intrahepatic  or extra hepatic biliary ductal dilation.    There is a dilated loop of small bowel in the left abdomen with distal  decompression of small bowel. A focal transition point is suspected in  the left abdomen seen on series 4 image 32, series 5 image 35, and  series 3 image 241). Redundant sigmoid colon. The appendix is  surgically absent. There is trace peritoneal fluid lateral to the  dilated loops of bowel (series 4 image 39). No bowel wall thickening,  pneumatosis, portal venous gas, or pneumoperitoneum.    No lymphadenopathy is identified. The vascular structures are normal.  Penile prosthesis and reservoir in the left pelvis.    No acute or suspicious osseous abnormality.      Impression    IMPRESSION:   1. Small bowel obstruction suspected as evidenced by a dilated segment  of small bowel in the left abdomen with a focal distal transposition  point identified (series 4 image 33). The proximal end of the dilated  loop tapers more gradually. No pneumatosis or pneumoperitoneum.  2. Cholelithiasis without evidence of acute cholecystitis.    I have personally reviewed the examination and initial interpretation  and I agree with the findings.    ADINA JACKMAN MD         SYSTEM ID:  [id number]     Medications     sodium chloride 125 mL/hr at 03/23/22 0846       bictegravir-emtricitabine-tenofovir  1 tablet Oral Daily     insulin aspart  1-6 Units Subcutaneous Q4H     insulin detemir  10 Units Subcutaneous QAM AC     insulin detemir  10 Units Subcutaneous At Bedtime     pantoprazole (PROTONIX) IV  40 mg Intravenous Daily with breakfast     sodium chloride (PF)  3 mL Intracatheter Q8H

## 2022-03-23 NOTE — H&P
[facility]    History and Physical - Hospitalist Service, GOLD TEAM        Date of Admission:  3/22/2022    Assessment & Plan      Andrew Lockwood is a 54 year old male admitted on 3/22/2022. He has PMH hx of DMII, GERD, HIV c/b CNS toxoplasmosis, s/p appendectomy, ex lap w/ lysis of adhesions (2016, 2018), recurrent SBOs with recent admission 3/14-3/17/22 for recurrent SBO who presents to the ED for evaluation of abdominal pain. Patient admitted to Medicine w/ Surgery consult.       ## Recurrent SBO  ## ? SIBO: Presented w/ ~ 13 hour hx of acute onset abdominal pain w/ mild nausea, no emesis. No fever, chills, or dysuria. Passing flatus and had formed BM this AM.  Recent admission 3/14-3/17 for same w/ conservative management. Patient has refused NGT placement in past. CT A/P w/ contrast: SBO suspected as evidenced bu a dilated segment of small bowel in the left abdomen w/ focal transition point; no pneumatosis or pneumoperitoneum; cholelithiasis w/o evidence of scute cholecystitis. LFT's wnl, Lipase 137. Mild tachycardia at 110, BP mildly elevated. Received IVF bolus and IV Dilaudid in th ED w/ mild improvement in sx. Started on cipro 500 mg BID on recent admission to complete 14 day course for possible SIBO.   - Appreciate Surgery recommendations and assistance  - NG if symptomatic  - NPO w/ exception of ice chips and meds  - Zofran for nausea  - IVF maintenance   - Cipro 400 mg IV q12h for SIBO   - Pain control w/ Dilaudid 2-4 mg SL q4h PRN w/ IV 0.3-0.5 mg q2h PRN for breakthrough      ## Poorly controlled DMII: A1c 11.2 (2/2022). BG mildly elevated on admission. PTA jaridance, metformin (both PO medications recently started on admission 3/14/22), Levemir 20 units BID, and novolog.   - Continue Levemir, though decrease to 10 units BID  - MSSI q4h  - Hold Jardiance and metformin  - Hypoglycemia protocol    ## HIV: Maintained on Biktarvy  - Continue     ## GERD: PTA  Protonix  - Continue, though IV for now      ## Housing Insecurity: Currently lives in a shelter in [place]. Has declined SW consults in past.     Diet: NPO for Medical/Clinical Reasons Except for: No Exceptions    DVT Prophylaxis:  Pneumatic Compression Devices  Potts Catheter: Not present  Central Lines: None  Cardiac Monitoring: None  Code Status:  Full Code     Clinically Significant Risk Factors Present on Admission     Disposition Plan   Expected Discharge:  TBD  Anticipated discharge location:  Awaiting care coordination huddle          The patient's care was discussed with the Attending Physician, Dr. Etienne.    Mary Sharp PA-C  Hospitalist Service, [facility]  Securely message with the Vocera Web Console (learn more here)  Text page via [facility] Paging/Directory   Please see signed in provider for up to date coverage information      ______________________________________________________________________    Chief Complaint   Abdominal pain    History is obtained from the patient and EMR    History of Present Illness   Andrew Lockwood is a 54 year old male w/ PMH as outlined above who presented with acute onset LLQ abdominal pain that began this AM (3/22/22). Patient reports that he thought the pain was from the antibiotic that he was prescribed on recent discharge and had no idea that it was another SBO. He is not experiencing an nausea or vomiting. No fever, chills, CP, cough, sob. He is passing flatus and had a formed BM this AM. We discussed POC as outlined above and patient agreeable.     Review of Systems    The 10 point Review of Systems is negative other than noted in the HPI or here.     Past Medical History    I have reviewed this patient's medical history and updated it with pertinent information if needed.   Past Medical History:   Diagnosis Date     AIDS (H)      Allergic rhinitis due to other allergen     DNS     Anal dysplasia       Chronic abdominal pain      CNS toxoplasmosis (H)      Diabetes type 2, controlled (H)      GERD (gastroesophageal reflux disease)      History of seizure      History of substance abuse (H)      HIV (human immunodeficiency virus infection) (H)      HLD (hyperlipidemia)      Lung nodules      Periungual wart      PTSD (post-traumatic stress disorder)      Sleep apnea     doesn't use CPAP       Past Surgical History   I have reviewed this patient's surgical history and updated it with pertinent information if needed.  Past Surgical History:   Procedure Laterality Date     ANOSCOPY N/A 9/9/2020    Procedure: Exam Under Anesthesia, ANOSCOPY, fulgeration of rectal fissures with Rectal Biopsies;  Surgeon: Thanh Lundberg MD;  Location: [facility] OR     COLONOSCOPY Left 1/22/2016    Procedure: COMBINED COLONOSCOPY, SINGLE OR MULTIPLE BIOPSY/POLYPECTOMY BY BIOPSY;  Surgeon: Clark Saini MD;  Location: [facility] GI     HC EXPLORE UNDESC TESTIS,INGUIN/SCROTAL       LAPAROSCOPIC APPENDECTOMY N/A 1/31/2018    Procedure: LAPAROSCOPIC APPENDECTOMY;  LAPAROSCOPIC APPENDECTOMY;  Surgeon: Dawn Holt MD;  Location: [facility] OR     LAPAROSCOPY DIAGNOSTIC (GENERAL) N/A 7/26/2016    Procedure: LAPAROSCOPY DIAGNOSTIC (GENERAL);  Surgeon: Susannah Arriaga MD;  Location: [facility] OR     LAPAROSCOPY DIAGNOSTIC (GENERAL) N/A 4/16/2018    Procedure: LAPAROSCOPY DIAGNOSTIC (GENERAL);  Diagnostic laparoscopy and lysis of adhesions;  Surgeon: Prince Dowling MD;  Location: [facility] OR     OPTICAL TRACKING SYSTEM CRANIOTOMY, EXCISE TUMOR, COMBINED Left 4/10/2015    Procedure: COMBINED OPTICAL TRACKING SYSTEM CRANIOTOMY, EXCISE TUMOR;  Surgeon: Mirlande Colmenares MD;  Location: [facility] OR     REPAIR GAMEKEEPER'S THUMB Right 12/2/2016    Procedure: REPAIR LIGAMENT ULNAR COLLATERAL THUMB (GAMEKEEPER'S);  Surgeon: Evin Zamorano MD;  Location: [facility] OR     ZZC NONSPECIFIC PROCEDURE      right forearm fracture       Social History   I have  reviewed this patient's social history and updated it with pertinent information if needed.  Social History     Tobacco Use     Smoking status: Current Every Day Smoker     Packs/day: 0.25     Years: 40.00     Pack years: 10.00     Types: Cigarettes     Smokeless tobacco: Former User   Substance Use Topics     Alcohol use: No     Alcohol/week: 0.0 standard drinks     Comment: Last etoh in 2007     Drug use: Not Currently     Types: Marijuana, Methamphetamines       Family History   I have reviewed this patient's family history and updated it with pertinent information if needed.  Family History   Problem Relation Age of Onset     Diabetes Brother      Diabetes Father      Alzheimer Disease Father      Unknown/Adopted Mother      Diabetes Paternal Grandfather      Cancer No family hx of         no skin cancer     Skin Cancer No family hx of         no famiy hx of skin cancer     Glaucoma No family hx of      Macular Degeneration No family hx of        Prior to Admission Medications   Prior to Admission Medications   Prescriptions Last Dose Informant Patient Reported? Taking?   Alcohol Swabs PADS  Self No No   Sig: Use to swab the area of the injection or todd as directed Per insurance coverage   NOVOLOG FLEXPEN 100 UNIT/ML soln   No No   Sig: Inject 5 Units Subcutaneous 3 times daily (with meals)   SENNA-docusate sodium (SENNA S) 8.6-50 MG tablet   No No   Sig: Take 1 tablet by mouth 2 times daily as needed (constipation)   STATIN NOT PRESCRIBED (INTENTIONAL)  Self No No   Sig: Please choose reason not prescribed, below   Sharps Container MISC  Self No No   Sig: Use as directed to dispose of needles, lancets and other sharps. Per Insurance coverage   bictegravir-emtricitabine-tenofovir (BIKTARVY) -25 MG per tablet Past Week at Unknown time  No Yes   Sig: Take 1 tablet by mouth daily   blood glucose (NO BRAND SPECIFIED) test strip  Self No No   Sig: Use to test blood sugar 4 times daily or as directed. To  accompany:whatever is covered   blood glucose calibration (NO BRAND SPECIFIED) solution  Self No No   Sig: Used to calibrate the blood glucose monitor as needed and as directed.  To accompany  blood glucose brands per insurance coverage   blood glucose monitoring (NO BRAND SPECIFIED) meter device kit  Self No No   Sig: Use as directed. Per insurance coverage.   ciprofloxacin (CIPRO) 500 MG tablet 3/21/2022 at Unknown time  No Yes   Sig: Take 1 tablet (500 mg) by mouth 2 times daily   empagliflozin (JARDIANCE) 25 MG TABS tablet 3/21/2022 at Unknown time  No Yes   Sig: Take 1 tablet (25 mg) by mouth daily   glucose 40 % (400 mg/mL) gel  Self No No   Sig: 15 g every 15 minutes by mouth as needed for low blood sugar. Oral gel is preferable for conscious and able to swallow patient.   ibuprofen (ADVIL/MOTRIN) 400 MG tablet Past Month at Unknown time  No Yes   Sig: Take 1 tablet (400 mg) by mouth every 4 hours as needed for moderate pain   insulin detemir (LEVEMIR PEN) 100 UNIT/ML pen 3/21/2022 at Unknown time Self No Yes   Sig: Inject 20 Units Subcutaneous 2 times daily   insulin pen needle (BD SCOTT U/F) 32G X 4 MM miscellaneous  Self No No   Sig: USE 3 TO 4 NEEDLES DAILY   metFORMIN (GLUCOPHAGE-XR) 500 MG 24 hr tablet   No No   Sig: Take 1 tablet (500 mg) by mouth daily (with dinner)   ondansetron (ZOFRAN-ODT) 4 MG ODT tab Past Week at Unknown time  No Yes   Sig: Take 1 tablet (4 mg) by mouth every 6 hours as needed for nausea or vomiting   pantoprazole (PROTONIX) 40 MG EC tablet Past Week at Unknown time Self No Yes   Sig: Take 1 tablet (40 mg) by mouth daily   polyethylene glycol (MIRALAX) 17 GM/Dose powder   No No   Sig: Take 17 g by mouth daily   simethicone (MYLICON) 80 MG chewable tablet Past Week at Unknown time  No Yes   Sig: Take 1 tablet (80 mg) by mouth every 6 hours as needed for cramping   thin (NO BRAND SPECIFIED) lancets  Self No No   Sig: Use with lanceting device. To accompany: Test 4 times daily with  "whatever is covered      Facility-Administered Medications: None     Allergies   Allergies   Allergen Reactions     Metformin      Abdominal pain     Tylenol [Acetaminophen] Itching     Dulaglutide Rash     Insulin Lispro Rash     Patient reported     Penicillin V Other (See Comments) and Rash     Diffuse maculopapular rash + feels \"high\", per pt.        Physical Exam   Vital Signs: Temp: 98.6  F (37  C)   BP: (!) 151/98 Pulse: 110   Resp: 16 SpO2: 98 %      Weight: 157 lbs 0 oz      Physical Exam   Constitutional: Pleasant male lying in ED bed. Conversant.MIld distress related to pain.   Well nourished, well developed  HEENT:   Head: Normocephalic and atraumatic.   Eyes: Conjunctivae are normal. Pupils are equal, round, and reactive to light.  Pharynx has no erythema or exudate, mucous membranes are moist  Neck:   No adenopathy, no bony tenderness  Cardiovascular: Regular rate and rhythm without murmurs or gallops  Pulmonary/Chest: Clear to auscultation bilaterally, with no wheezes or retractions. No respiratory distress.  GI: Soft with good bowel sounds.  Mild TTP in LLQ non-distended, with no guarding, no rebound, no peritoneal signs.   Back:  No bony or CVA tenderness   Musculoskeletal:  No edema or clubbing   Skin: Skin is warm and dry. No rash noted to exposed skin areas .   Neurological: Alert and oriented to person, place, and time. Nonfocal exam  Psychiatric:  Normal mood and affect.      Data   Data reviewed today: I reviewed all medications, new labs and imaging results over the last 24 hours. I personally reviewed recent imaging impressions, daily labs, progress notes,      Recent Labs   Lab 03/22/22  1417 03/17/22  1714 03/17/22  1023 03/17/22  0653 03/17/22  0617   WBC 10.3  --   --   --  6.0   HGB 13.0*  --   --   --  11.9*   MCV 86  --   --   --  85   *  --   --   --  349     --   --   --  137   POTASSIUM 3.6  --   --   --  3.4   CHLORIDE 106  --   --   --  104   CO2 22  --   --   --  28 "   BUN 17  --   --   --  10   CR 0.75  --   --   --  0.61*   ANIONGAP 8  --   --   --  5   LINDA 9.4  --   --   --  9.2   * 191* 227*   < > 140*   ALBUMIN 3.7  --   --   --  3.0*   PROTTOTAL 8.2  --   --   --  7.0   BILITOTAL 0.5  --   --   --  0.4   ALKPHOS 123  --   --   --  114   ALT 18  --   --   --  14   AST 10  --   --   --  9   LIPASE 137  --   --   --   --     < > = values in this interval not displayed.

## 2022-03-23 NOTE — PLAN OF CARE
SHIFT: 1900--0700  Neuro: A&Ox4.   Cardiac: WDL, HR regular rhythm,    Respiratory: WDL, did not notice any shortness of breath this shift.   GI/: X small bowel obstruction and no urine or BM occurrence this shift. Patient is planning for surgery in two days.   Diet/appetite: Regular diet with BS check, NOP this shift, refused NG tube in ER.   Activity:  Independent up to chair and in halls.  Pain: At acceptable level on current regimen.   Skin: No new deficits noted.  LDA's: Continuous IV infusion with 0.9 normal saline.   BP (!) 145/89 (BP Location: Left arm)   Pulse 90   Temp 98.5  F (36.9  C) (Oral)   Resp 16   Wt 71.2 kg (157 lb)   SpO2 99%   BMI 26.95 kg/m

## 2022-03-23 NOTE — PROGRESS NOTES
The Pt care assumed at 0700. Pt angry and agitated. Emotional support given.AM med administered. Gastrografin Solution  75 ml mixed with 75 ml water given and Pt tolerated solution over 5 minutes period. Imaging called and updated. Pt  Scheduled for 4:30 pm. Continues to be NPO.Pt informed and he verbalized understanding.The Pt denies nausea and vomiting. Blood sugar this , no sliding scale Insulin required.Call light within reach.

## 2022-03-23 NOTE — PROGRESS NOTES
Pt is admitted for Abd pain and nausea.Pt has been NPO. Denies nausea and vomiting. Oral Dilaudid given for abdominal pain and iv Dilaudid administered x 1. Continues with NS at 125 ml/hr. Cipro iv administered x 1 this shift.   Blood sugar at 1500 was 98.Voided 800 ml lexy color urine and specimen sent to lab.Call light within reach.BP (!) 142/91 (BP Location: Left arm)   Pulse 81   Temp 98.6  F (37  C) (Oral)   Resp 18   Wt 70.5 kg (155 lb 8 oz)   SpO2 98%   BMI 26.69 kg/m    Will continue to monitor and updated as needed.

## 2022-03-24 ENCOUNTER — PATIENT OUTREACH (OUTPATIENT)
Dept: SURGERY | Facility: CLINIC | Age: 57
End: 2022-03-24
Payer: COMMERCIAL

## 2022-03-24 VITALS
DIASTOLIC BLOOD PRESSURE: 84 MMHG | RESPIRATION RATE: 16 BRPM | OXYGEN SATURATION: 95 % | TEMPERATURE: 98.2 F | HEART RATE: 80 BPM | SYSTOLIC BLOOD PRESSURE: 123 MMHG | BODY MASS INDEX: 26.69 KG/M2 | WEIGHT: 155.5 LBS

## 2022-03-24 LAB — GLUCOSE BLDC GLUCOMTR-MCNC: 91 MG/DL (ref 70–99)

## 2022-03-24 ASSESSMENT — ACTIVITIES OF DAILY LIVING (ADL)
ADLS_ACUITY_SCORE: 5

## 2022-03-24 NOTE — PROVIDER NOTIFICATION
Gold 6 Paged: RE:Pt signed AMA paperwork and left. Pt claims he has to attend immigration appointment at [place].

## 2022-03-24 NOTE — DISCHARGE SUMMARY
[facility]  Hospitalist Discharge Summary      Date of Admission:  3/22/2022  Date of Discharge:  Patient left AMA 3/24/2022  5:34 AM  Discharging Provider: Lou Canela PA-C  Discharge Service: Hospitalist Service, GOLD TEAM 6    Discharge Diagnoses   Acute abdominal pain 2/2 recurrent SBO    Follow-ups Needed After Discharge   Follow up with PCP after discharge     Unresulted Labs Ordered in the Past 30 Days of this Admission     No orders found from 2/20/2022 to 3/23/2022.          Discharge Disposition   Left AMA     Hospital Course   Andrew Lockwood is a 54 year old male with PMHx of IDDM2, GERD, HIV c/b CNS toxoplasmosis, s/p appendectomy, ex lap w/ lysis of adhesions (2016, 2018), recurrent SBOs with recent admission 3/14-3/17/22 for recurrent SBO, who presented with abdominal pain admitted on 3/22/2022, for recurrent partial SBO, with general surgery consultation. Patient was given IVF and pain medications. Plan was to take patient to OR for laparotomy, but patient left AMA. Please see below for details regarding hospital course.      # Recurrent SBO-  Presented w/ ~ 13 hour hx of acute onset abdominal pain w/ mild nausea, no emesis. No fever, chills, or dysuria. Passing flatus and had formed BM this AM.  Recent admission 3/14-3/17 for same w/ conservative management. Patient has refused NGT placement in past. CT A/P w/ contrast: SBO suspected as evidenced by a dilated segment of small bowel in the left abdomen w/ focal transition point; no pneumatosis or pneumoperitoneum; cholelithiasis w/o evidence of scute cholecystitis. LFT's wnl, Lipase 137. General surgery consulted. Gastrografin challenge suggestive of partial small bowel obstruction. Patient treated with bowel rest, IVF and pain medications. Plan to take to OR on 3/24, but patient left AMA.     # Posssible SIBO: Started on cipro 500 mg BID on recent admission to complete 14 day course for possible SIBO  (start date 3/16)    # Poorly controlled DMII: A1c 11.2 (2/2022). BG mildly elevated on admission. PTA jaridance, metformin (both PO medications recently started on admission 3/14/22), Levemir 20 units BID, and novolog. Patient placed on lower dose levemir during hospital course while NPO.     # HIV: Continue PTA Biktarvy -25 mg daily. CD4 count 489. Per ID if CD4 count >200 no need for ppx.    # GERD: PTA Protonix, will continue with IV Protonix while NPO     # Housing Insecurity: Currently lives in a shelter in [place]. Has declined SW consults in past.       Consultations This Hospital Stay   VASCULAR ACCESS CARE ADULT IP CONSULT  VASCULAR ACCESS CARE ADULT IP CONSULT  PHYSICAL THERAPY ADULT IP CONSULT  OCCUPATIONAL THERAPY ADULT IP CONSULT    Code Status   Prior    Time Spent on this Encounter   I, Lou Canela PA-C, personally saw the patient today and spent less than or equal to 30 minutes discharging this patient.       Lou Canela PA-C  [facility] [address] [address]  Phone: [phone]  Fax: [phone]  ______________________________________________________________________    Physical Exam   Vital Signs: Temp: (P) 98.1  F (36.7  C) Temp src: (P) Oral BP: (P) 123/88 Pulse: (P) 82   Resp: (P) 16 SpO2: (P) 96 % O2 Device: (P) None (Room air)    Weight: 155 lbs 8 oz  Patient left AMA overnight.        Primary Care Physician   Ayala Prieto    Discharge Orders       Significant Results and Procedures   Most Recent 3 CBC's:Recent Labs   Lab Test 03/23/22  0615 03/22/22  1417 03/17/22  0617   WBC 7.6 10.3 6.0   HGB 11.4* 13.0* 11.9*   MCV 87 86 85    460* 349     Most Recent 3 BMP's:Recent Labs   Lab Test 03/24/22  0225 03/23/22  2225 03/23/22  1808 03/23/22  0834 03/23/22  0615 03/22/22 2023 03/22/22  1417 03/17/22  0653 03/17/22  0617   NA  --   --   --   --  136  --  136  --  137   POTASSIUM  --   --   --    --  3.5  --  3.6  --  3.4   CHLORIDE  --   --   --   --  110*  --  106  --  104   CO2  --   --   --   --  19*  --  22  --  28   BUN  --   --   --   --  11  --  17  --  10   CR  --   --   --   --  0.71  --  0.75  --  0.61*   ANIONGAP  --   --   --   --  7  --  8  --  5   LINDA  --   --   --   --  8.8  --  9.4  --  9.2   GLC 91 90 110*   < > 129*   < > 227*   < > 140*    < > = values in this interval not displayed.     Most Recent 6 glucoses:Recent Labs   Lab Test 03/24/22  0225 03/23/22  2225 03/23/22  1808 03/23/22  1539 03/23/22  1118 03/23/22  0834   GLC 91 90 110* 98 113* 128*   ,   Results for orders placed or performed during the hospital encounter of 03/22/22   Abdomen XR, 2 vw, flat and upright    Narrative    EXAM: XR ABDOMEN 2VIEWS 3/22/2022 4:00 PM     HISTORY: Abdominal pain, hx of SBO, eval for obstruction, free air       COMPARISON: 3/14/2022    FINDINGS:   Upright and supine AP views of the abdomen. Nonspecific paucity of  small bowel gas. Moderate stool burden. No pneumatosis, portal venous  gas, or free air. Left pelvic reservoir. The included osseous  structures and soft tissues are within normal limits. The lung bases  are clear.        Impression    IMPRESSION:   Nonspecific paucity of small bowel gas without pneumatosis or free  air.    I have personally reviewed the examination and initial interpretation  and I agree with the findings.    MARY ROMERO MD         SYSTEM ID:  [id number]   CT Abdomen Pelvis w Contrast    Narrative    EXAMINATION: CT ABDOMEN PELVIS W CONTRAST, 3/22/2022 5:40 PM    INDICATION: abdominal pain, worsening despite intervention here, hx of  SBO, x-ray with paucity of small bowel gas, eval for SBO    COMPARISON STUDY: Same day abdomen radiographs, CT 2/19/2022    TECHNIQUE: CT scan of the abdomen and pelvis was performed on  multidetector CT scanner using volumetric acquisition technique and  images were reconstructed in multiple planes with variable thickness  and reviewed  on dedicated workstations.     CONTRAST: iopamidol (ISOVUE-370) solution 96 mL injected IV without  oral contrast    CT scan radiation dose is optimized to minimum requisite dose using  automated dose modulation techniques.    FINDINGS:    The lung bases are clear. Small hiatal hernia.    The liver, spleen, pancreas, adrenals and kidneys have a normal  appearance. Tiny calcified stone in the gallbladder without  gallbladder wall thickening or pericholecystic fluid. No intrahepatic  or extra hepatic biliary ductal dilation.    There is a dilated loop of small bowel in the left abdomen with distal  decompression of small bowel. A focal transition point is suspected in  the left abdomen seen on series 4 image 32, series 5 image 35, and  series 3 image 241). Redundant sigmoid colon. The appendix is  surgically absent. There is trace peritoneal fluid lateral to the  dilated loops of bowel (series 4 image 39). No bowel wall thickening,  pneumatosis, portal venous gas, or pneumoperitoneum.    No lymphadenopathy is identified. The vascular structures are normal.  Penile prosthesis and reservoir in the left pelvis.    No acute or suspicious osseous abnormality.      Impression    IMPRESSION:   1. Small bowel obstruction suspected as evidenced by a dilated segment  of small bowel in the left abdomen with a focal distal transposition  point identified (series 4 image 33). The proximal end of the dilated  loop tapers more gradually. No pneumatosis or pneumoperitoneum.  2. Cholelithiasis without evidence of acute cholecystitis.    I have personally reviewed the examination and initial interpretation  and I agree with the findings.    ADINA JACKMAN MD         SYSTEM ID:  [id number]   XR Gastrografin Challenge    Narrative    Exam: XR GASTROGRAFIN CHALLENGE, 3/23/2022 5:09 PM    Indication: Gastrografin UGI Challenge    Comparison: CT and radiographs 3/22/2022    Findings:   Frontal supine views of the abdomen eight hours after  the  administration of Gastrografin. There is Gastrografin in the  descending colon. Multiple dilated loops of small bowel. No  pneumatosis. The visualized lungs are clear.      Impression    Impression: Gastrografin present in the descending colon eight hours  post-administration. Dilated loops of small bowel suggestive of  partial small bowel obstruction.    I have personally reviewed the examination and initial interpretation  and I agree with the findings.    DRE HIGGINS MD         SYSTEM ID:  [id number]     *Note: Due to a large number of results and/or encounters for the requested time period, some results have not been displayed. A complete set of results can be found in Results Review.       Discharge Medications   Discharge Medication List as of 3/24/2022  5:35 AM      CONTINUE these medications which have NOT CHANGED    Details   Alcohol Swabs PADS Use to swab the area of the injection or todd as directed Per insurance coverage, Disp-100 each, R-0, E-Prescribe      bictegravir-emtricitabine-tenofovir (BIKTARVY) -25 MG per tablet Take 1 tablet by mouth daily, Disp-90 tablet, R-0, E-Prescribe      blood glucose (NO BRAND SPECIFIED) test strip Use to test blood sugar 4 times daily or as directed. To accompany:whatever is covered, Disp-360 strip, R-3, E-Prescribe      blood glucose calibration (NO BRAND SPECIFIED) solution Used to calibrate the blood glucose monitor as needed and as directed.  To accompany  blood glucose brands per insurance coverage, Disp-1 each, R-0, E-Prescribe      blood glucose monitoring (NO BRAND SPECIFIED) meter device kit Use as directed. Per insurance coverage.Disp-1 kit, [id number]      ciprofloxacin (CIPRO) 500 MG tablet Take 1 tablet (500 mg) by mouth 2 times daily, Disp-26 tablet, R-0, E-Prescribe      empagliflozin (JARDIANCE) 25 MG TABS tablet Take 1 tablet (25 mg) by mouth daily, Disp-90 tablet, R-0, E-Prescribe      glucose 40 % (400 mg/mL) gel 15 g every 15  minutes by mouth as needed for low blood sugar. Oral gel is preferable for conscious and able to swallow patient.Disp-112.5 g, [id number]      insulin detemir (LEVEMIR PEN) 100 UNIT/ML pen Inject 20 Units Subcutaneous 2 times daily, Disp-15 mL, R-0, E-Prescribe      insulin pen needle (BD SCOTT U/F) 32G X 4 MM miscellaneous USE 3 TO 4 NEEDLES DAILYDisp-400 each, [id number]      metFORMIN (GLUCOPHAGE-XR) 500 MG 24 hr tablet Take 1 tablet (500 mg) by mouth daily (with dinner), Disp-90 tablet, R-0, E-Prescribe      NOVOLOG FLEXPEN 100 UNIT/ML soln Inject 5 Units Subcutaneous 3 times daily (with meals), Disp-75 mL, R-0, OLESYA, E-Prescribe      pantoprazole (PROTONIX) 40 MG EC tablet Take 1 tablet (40 mg) by mouth daily, Disp-30 tablet, R-6, E-Prescribe      polyethylene glycol (MIRALAX) 17 GM/Dose powder Take 17 g by mouth daily, Disp-510 g, R-3, E-Prescribe      SENNA-docusate sodium (SENNA S) 8.6-50 MG tablet Take 1 tablet by mouth 2 times daily as needed (constipation), Disp-30 tablet, R-0, E-Prescribe      Sharps Container MISC Use as directed to dispose of needles, lancets and other sharps. Per Insurance coverage, Disp-1 each, R-0, E-Prescribe      simethicone (MYLICON) 80 MG chewable tablet Take 1 tablet (80 mg) by mouth every 6 hours as needed for cramping, Disp-90 tablet, R-0, E-Prescribe      STATIN NOT PRESCRIBED (INTENTIONAL) Reason Statin was Not Prescribed: OTHER - Enter reason is comments section (This option does NOTexclude patient from measure) / noted in chart that is recommended multiple timesNo Print Out      thin (NO BRAND SPECIFIED) lancets Use with lanceting device. To accompany: Test 4 times daily with whatever is covered, Disp-360 each, R-3, E-Prescribe         STOP taking these medications       ibuprofen (ADVIL/MOTRIN) 400 MG tablet Comments:   Reason for Stopping:         ondansetron (ZOFRAN-ODT) 4 MG ODT tab Comments:   Reason for Stopping:             Allergies   Allergies   Allergen  "Reactions     Metformin      Abdominal pain     Tylenol [Acetaminophen] Itching     Dulaglutide Rash     Insulin Lispro Rash     Patient reported     Penicillin V Other (See Comments) and Rash     Diffuse maculopapular rash + feels \"high\", per pt.      "

## 2022-03-24 NOTE — LETTER
March 25, 2022      TO: Andrew Lockwood  [address] [facility] [address]           Dear Andrew Lockwood,     I have been trying to reach you by phone, but unfortunately have not been able to. This letters gives details you need for your upcoming appointment. Dr. Dowling would like to meet with you in person in the clinic to follow-up after your recent hospitalization. Please call us at [phone] to schedule an appointment.     Thank you for entrusting your care to [facility].      Sincerely,    The General Surgery Team

## 2022-03-24 NOTE — PROVIDER NOTIFICATION
Gold 6 Paged RE:It shows on our board pt has laparotomy at 7:30 AM . Do we need to complete pre-op?

## 2022-03-24 NOTE — PROGRESS NOTES
Andrew Lockwood is a patient of Dr. Prince Dowling that was admitted for SBO and was supposed to undergo a surgical procedure but left AMA yesterday due to personal reasons. Attempted to contact patient via telephone for a status update and review post discharge  teaching. Unable to leave voicemail on cell phone due to mailbox being full. Will attempt call at a later date.     Of note:  Follow-up: Needed in 2 weeks with Dr. Dowling to discuss elective procedure  Restrictions:  - No activity restrictions  New medications:  None  Equipment/Supplies:  None

## 2022-03-24 NOTE — PROVIDER NOTIFICATION
Gold cross cover paged RE: pt was given all available pain medications, insists that there was no change, is there anything else we can give? thank you!   SENG Curiel [phone]

## 2022-03-24 NOTE — PROGRESS NOTES
"Pt called and told the nurse he would like to leave to attend immigration appointment this morning.RN informed pt she would inform the doctor . In the process of calling the provider ,the pt walked at the nursing station fully dressed demanding to signs AMA paperwork. Staff attempted to talk to the pt but he declined to wait for the doctor. Staff requested to remove PIV and pt said,\" I took it off already.\" Pt showed staff his arm and IV was removed. Pt was given AMA paperwork which he signed and gave back to the nurse and asked staff to open the exit door.  "

## 2022-03-25 ENCOUNTER — TELEPHONE (OUTPATIENT)
Dept: INFECTIOUS DISEASES | Facility: CLINIC | Age: 57
End: 2022-03-25
Payer: COMMERCIAL

## 2022-03-25 ENCOUNTER — PATIENT OUTREACH (OUTPATIENT)
Dept: CARE COORDINATION | Facility: CLINIC | Age: 57
End: 2022-03-25
Payer: COMMERCIAL

## 2022-03-25 DIAGNOSIS — Z71.89 OTHER SPECIFIED COUNSELING: ICD-10-CM

## 2022-03-25 NOTE — TELEPHONE ENCOUNTER
MTM referral from: Transitions of Care (recent hospital discharge or ED visit)    MTM referral outreach attempt #2 on March 25, 2022 at 3:43 PM      Outcome: Patient not reachable after several attempts, will route to [facility] Pharmacist/Provider as an FYI.  [facility] scheduling number is [phone].  Thank you for the referral.    Eliza Zavaleta, [facility]

## 2022-03-25 NOTE — PROGRESS NOTES
Attempted to contact patient x 2 for post-discharge telephone call/status update. Patients voicemail box is full, unable to leave message. Will attempt to send letter via mail.

## 2022-03-25 NOTE — PROGRESS NOTES
Clinic Care Coordination Contact  [facility]/Voicemail       Clinical Data: Care Coordinator Outreach  Outreach attempted x 1. Unable to leave message on patient's voicemail with call back information and requested return call.  Plan: Care Coordinator will try to reach patient again in 1-2 business days.        NAA Conner  [phone]  [facility]

## 2022-03-26 NOTE — PROGRESS NOTES
Clinic Care Coordination Contact  [facility]/Voicemail       Clinical Data: Care Coordinator Outreach  Outreach attempted x 2. Unable to leave message on patient's voicemail with call back information.    Plan: Care Coordinator will do no further outreaches at this time.    GERONIMO Vazquez  [phone]  [facility]

## 2022-03-29 ENCOUNTER — TELEPHONE (OUTPATIENT)
Dept: ENDOCRINOLOGY | Facility: CLINIC | Age: 57
End: 2022-03-29

## 2022-03-29 NOTE — TELEPHONE ENCOUNTER
Left voicemessage for patient to call Adelia back to discuss upcoming visit.    Adelia Price  Patient Care Supervisor

## 2022-03-31 ENCOUNTER — PRE VISIT (OUTPATIENT)
Dept: ENDOCRINOLOGY | Facility: CLINIC | Age: 57
End: 2022-03-31

## 2022-04-04 DIAGNOSIS — E11.65 TYPE 2 DIABETES MELLITUS WITH HYPERGLYCEMIA, WITHOUT LONG-TERM CURRENT USE OF INSULIN (H): ICD-10-CM

## 2022-04-06 RX ORDER — PEN NEEDLE, DIABETIC 32GX 5/32"
NEEDLE, DISPOSABLE MISCELLANEOUS
Qty: 400 EACH | Refills: 0 | Status: SHIPPED | OUTPATIENT
Start: 2022-04-06 | End: 2022-08-31

## 2022-04-06 NOTE — TELEPHONE ENCOUNTER
insulin pen needle (BD SCOTT U/F) 32G X 4 MM miscellaneous      Last Written Prescription Date:  7/17/21 by Camila Aj  Last Fill Quantity: 400 each,   # refills: 3  Last Office Visit : 8/24/20 with Haley Shrestha recommended 2-4 week follow up  No showed 10/19/21 with Lenora Haley  Future Office visit:  None scheduled    Routing refill request to provider for review/approval because:  Not prescribed by requested provider  > 18 months since last visit

## 2022-04-29 ENCOUNTER — APPOINTMENT (OUTPATIENT)
Dept: CT IMAGING | Facility: CLINIC | Age: 57
End: 2022-04-29
Attending: STUDENT IN AN ORGANIZED HEALTH CARE EDUCATION/TRAINING PROGRAM
Payer: COMMERCIAL

## 2022-04-29 ENCOUNTER — HOSPITAL ENCOUNTER (INPATIENT)
Facility: CLINIC | Age: 57
LOS: 1 days | Discharge: LEFT BEFORE DISCHARGE | End: 2022-04-30
Attending: STUDENT IN AN ORGANIZED HEALTH CARE EDUCATION/TRAINING PROGRAM | Admitting: STUDENT IN AN ORGANIZED HEALTH CARE EDUCATION/TRAINING PROGRAM
Payer: COMMERCIAL

## 2022-04-29 DIAGNOSIS — Z11.52 ENCOUNTER FOR SCREENING LABORATORY TESTING FOR SEVERE ACUTE RESPIRATORY SYNDROME CORONAVIRUS 2 (SARS-COV-2): ICD-10-CM

## 2022-04-29 DIAGNOSIS — K56.600 PARTIAL INTESTINAL OBSTRUCTION, UNSPECIFIED CAUSE (H): ICD-10-CM

## 2022-04-29 PROBLEM — U07.1 COVID-19: Status: ACTIVE | Noted: 2022-01-11

## 2022-04-29 LAB
ALBUMIN SERPL-MCNC: 4 G/DL (ref 3.4–5)
ALP SERPL-CCNC: 141 U/L (ref 40–150)
ALT SERPL W P-5'-P-CCNC: 28 U/L (ref 0–70)
ANION GAP SERPL CALCULATED.3IONS-SCNC: 13 MMOL/L (ref 3–14)
APTT PPP: 29 SECONDS (ref 22–38)
AST SERPL W P-5'-P-CCNC: 17 U/L (ref 0–45)
BASOPHILS # BLD AUTO: 0.1 10E3/UL (ref 0–0.2)
BASOPHILS NFR BLD AUTO: 1 %
BILIRUB SERPL-MCNC: 0.8 MG/DL (ref 0.2–1.3)
BUN SERPL-MCNC: 18 MG/DL (ref 7–30)
CALCIUM SERPL-MCNC: 9.9 MG/DL (ref 8.5–10.1)
CHLORIDE BLD-SCNC: 106 MMOL/L (ref 94–109)
CO2 SERPL-SCNC: 16 MMOL/L (ref 20–32)
CREAT SERPL-MCNC: 0.69 MG/DL (ref 0.66–1.25)
EOSINOPHIL # BLD AUTO: 0.1 10E3/UL (ref 0–0.7)
EOSINOPHIL NFR BLD AUTO: 1 %
ERYTHROCYTE [DISTWIDTH] IN BLOOD BY AUTOMATED COUNT: 13.2 % (ref 10–15)
GFR SERPL CREATININE-BSD FRML MDRD: >90 ML/MIN/1.73M2
GLUCOSE BLD-MCNC: 259 MG/DL (ref 70–99)
HCT VFR BLD AUTO: 46.4 % (ref 40–53)
HGB BLD-MCNC: 14.9 G/DL (ref 13.3–17.7)
HOLD SPECIMEN: NORMAL
HOLD SPECIMEN: NORMAL
IMM GRANULOCYTES # BLD: 0.1 10E3/UL
IMM GRANULOCYTES NFR BLD: 1 %
INR PPP: 0.96 (ref 0.85–1.15)
LACTATE SERPL-SCNC: 2.1 MMOL/L (ref 0.7–2)
LIPASE SERPL-CCNC: 144 U/L (ref 73–393)
LYMPHOCYTES # BLD AUTO: 1.9 10E3/UL (ref 0.8–5.3)
LYMPHOCYTES NFR BLD AUTO: 17 %
MCH RBC QN AUTO: 26.8 PG (ref 26.5–33)
MCHC RBC AUTO-ENTMCNC: 32.1 G/DL (ref 31.5–36.5)
MCV RBC AUTO: 84 FL (ref 78–100)
MONOCYTES # BLD AUTO: 0.6 10E3/UL (ref 0–1.3)
MONOCYTES NFR BLD AUTO: 5 %
NEUTROPHILS # BLD AUTO: 8.3 10E3/UL (ref 1.6–8.3)
NEUTROPHILS NFR BLD AUTO: 75 %
NRBC # BLD AUTO: 0 10E3/UL
NRBC BLD AUTO-RTO: 0 /100
PLATELET # BLD AUTO: 354 10E3/UL (ref 150–450)
POTASSIUM BLD-SCNC: 3.6 MMOL/L (ref 3.4–5.3)
PROT SERPL-MCNC: 8.9 G/DL (ref 6.8–8.8)
RBC # BLD AUTO: 5.55 10E6/UL (ref 4.4–5.9)
SARS-COV-2 RNA RESP QL NAA+PROBE: NEGATIVE
SODIUM SERPL-SCNC: 135 MMOL/L (ref 133–144)
WBC # BLD AUTO: 10.9 10E3/UL (ref 4–11)

## 2022-04-29 PROCEDURE — 83690 ASSAY OF LIPASE: CPT | Performed by: STUDENT IN AN ORGANIZED HEALTH CARE EDUCATION/TRAINING PROGRAM

## 2022-04-29 PROCEDURE — 96374 THER/PROPH/DIAG INJ IV PUSH: CPT | Mod: 59 | Performed by: STUDENT IN AN ORGANIZED HEALTH CARE EDUCATION/TRAINING PROGRAM

## 2022-04-29 PROCEDURE — 74177 CT ABD & PELVIS W/CONTRAST: CPT

## 2022-04-29 PROCEDURE — U0003 INFECTIOUS AGENT DETECTION BY NUCLEIC ACID (DNA OR RNA); SEVERE ACUTE RESPIRATORY SYNDROME CORONAVIRUS 2 (SARS-COV-2) (CORONAVIRUS DISEASE [COVID-19]), AMPLIFIED PROBE TECHNIQUE, MAKING USE OF HIGH THROUGHPUT TECHNOLOGIES AS DESCRIBED BY CMS-2020-01-R: HCPCS | Performed by: EMERGENCY MEDICINE

## 2022-04-29 PROCEDURE — 83605 ASSAY OF LACTIC ACID: CPT | Performed by: STUDENT IN AN ORGANIZED HEALTH CARE EDUCATION/TRAINING PROGRAM

## 2022-04-29 PROCEDURE — 74177 CT ABD & PELVIS W/CONTRAST: CPT | Mod: 26 | Performed by: RADIOLOGY

## 2022-04-29 PROCEDURE — 258N000003 HC RX IP 258 OP 636: Performed by: STUDENT IN AN ORGANIZED HEALTH CARE EDUCATION/TRAINING PROGRAM

## 2022-04-29 PROCEDURE — 99223 1ST HOSP IP/OBS HIGH 75: CPT | Mod: AI | Performed by: STUDENT IN AN ORGANIZED HEALTH CARE EDUCATION/TRAINING PROGRAM

## 2022-04-29 PROCEDURE — 99283 EMERGENCY DEPT VISIT LOW MDM: CPT | Performed by: STUDENT IN AN ORGANIZED HEALTH CARE EDUCATION/TRAINING PROGRAM

## 2022-04-29 PROCEDURE — 36415 COLL VENOUS BLD VENIPUNCTURE: CPT | Performed by: STUDENT IN AN ORGANIZED HEALTH CARE EDUCATION/TRAINING PROGRAM

## 2022-04-29 PROCEDURE — 85610 PROTHROMBIN TIME: CPT | Performed by: STUDENT IN AN ORGANIZED HEALTH CARE EDUCATION/TRAINING PROGRAM

## 2022-04-29 PROCEDURE — 85730 THROMBOPLASTIN TIME PARTIAL: CPT | Performed by: STUDENT IN AN ORGANIZED HEALTH CARE EDUCATION/TRAINING PROGRAM

## 2022-04-29 PROCEDURE — 85025 COMPLETE CBC W/AUTO DIFF WBC: CPT | Performed by: STUDENT IN AN ORGANIZED HEALTH CARE EDUCATION/TRAINING PROGRAM

## 2022-04-29 PROCEDURE — 82040 ASSAY OF SERUM ALBUMIN: CPT | Performed by: STUDENT IN AN ORGANIZED HEALTH CARE EDUCATION/TRAINING PROGRAM

## 2022-04-29 PROCEDURE — 96361 HYDRATE IV INFUSION ADD-ON: CPT | Performed by: STUDENT IN AN ORGANIZED HEALTH CARE EDUCATION/TRAINING PROGRAM

## 2022-04-29 PROCEDURE — 120N000001 HC R&B MED SURG/OB

## 2022-04-29 PROCEDURE — 99285 EMERGENCY DEPT VISIT HI MDM: CPT | Mod: 25 | Performed by: STUDENT IN AN ORGANIZED HEALTH CARE EDUCATION/TRAINING PROGRAM

## 2022-04-29 PROCEDURE — 250N000011 HC RX IP 250 OP 636: Performed by: STUDENT IN AN ORGANIZED HEALTH CARE EDUCATION/TRAINING PROGRAM

## 2022-04-29 PROCEDURE — 87040 BLOOD CULTURE FOR BACTERIA: CPT | Performed by: STUDENT IN AN ORGANIZED HEALTH CARE EDUCATION/TRAINING PROGRAM

## 2022-04-29 PROCEDURE — 80053 COMPREHEN METABOLIC PANEL: CPT | Performed by: STUDENT IN AN ORGANIZED HEALTH CARE EDUCATION/TRAINING PROGRAM

## 2022-04-29 PROCEDURE — C9803 HOPD COVID-19 SPEC COLLECT: HCPCS | Performed by: STUDENT IN AN ORGANIZED HEALTH CARE EDUCATION/TRAINING PROGRAM

## 2022-04-29 RX ORDER — ONDANSETRON 4 MG/1
4 TABLET, ORALLY DISINTEGRATING ORAL EVERY 6 HOURS PRN
Status: DISCONTINUED | OUTPATIENT
Start: 2022-04-29 | End: 2022-04-30 | Stop reason: HOSPADM

## 2022-04-29 RX ORDER — IBUPROFEN 600 MG/1
600 TABLET, FILM COATED ORAL EVERY 6 HOURS PRN
Status: DISCONTINUED | OUTPATIENT
Start: 2022-04-29 | End: 2022-04-30 | Stop reason: HOSPADM

## 2022-04-29 RX ORDER — IOPAMIDOL 755 MG/ML
95 INJECTION, SOLUTION INTRAVASCULAR ONCE
Status: COMPLETED | OUTPATIENT
Start: 2022-04-29 | End: 2022-04-29

## 2022-04-29 RX ORDER — PROCHLORPERAZINE MALEATE 10 MG
10 TABLET ORAL EVERY 6 HOURS PRN
Status: DISCONTINUED | OUTPATIENT
Start: 2022-04-29 | End: 2022-04-30 | Stop reason: HOSPADM

## 2022-04-29 RX ORDER — HYDROMORPHONE HYDROCHLORIDE 1 MG/ML
.3-.5 INJECTION, SOLUTION INTRAMUSCULAR; INTRAVENOUS; SUBCUTANEOUS
Status: DISCONTINUED | OUTPATIENT
Start: 2022-04-29 | End: 2022-04-30

## 2022-04-29 RX ORDER — SODIUM CHLORIDE, SODIUM LACTATE, POTASSIUM CHLORIDE, CALCIUM CHLORIDE 600; 310; 30; 20 MG/100ML; MG/100ML; MG/100ML; MG/100ML
INJECTION, SOLUTION INTRAVENOUS CONTINUOUS
Status: DISCONTINUED | OUTPATIENT
Start: 2022-04-29 | End: 2022-04-30 | Stop reason: HOSPADM

## 2022-04-29 RX ORDER — HYDROMORPHONE HYDROCHLORIDE 2 MG/1
2-4 TABLET ORAL EVERY 4 HOURS PRN
Status: DISCONTINUED | OUTPATIENT
Start: 2022-04-29 | End: 2022-04-30 | Stop reason: HOSPADM

## 2022-04-29 RX ORDER — PROCHLORPERAZINE 25 MG
25 SUPPOSITORY, RECTAL RECTAL EVERY 12 HOURS PRN
Status: DISCONTINUED | OUTPATIENT
Start: 2022-04-29 | End: 2022-04-30 | Stop reason: HOSPADM

## 2022-04-29 RX ORDER — LIDOCAINE 40 MG/G
CREAM TOPICAL
Status: DISCONTINUED | OUTPATIENT
Start: 2022-04-29 | End: 2022-04-30 | Stop reason: HOSPADM

## 2022-04-29 RX ORDER — ONDANSETRON 2 MG/ML
4 INJECTION INTRAMUSCULAR; INTRAVENOUS EVERY 30 MIN PRN
Status: DISCONTINUED | OUTPATIENT
Start: 2022-04-29 | End: 2022-04-29

## 2022-04-29 RX ORDER — ONDANSETRON 2 MG/ML
4 INJECTION INTRAMUSCULAR; INTRAVENOUS EVERY 6 HOURS PRN
Status: DISCONTINUED | OUTPATIENT
Start: 2022-04-29 | End: 2022-04-30 | Stop reason: HOSPADM

## 2022-04-29 RX ORDER — AMOXICILLIN 250 MG
1 CAPSULE ORAL 2 TIMES DAILY PRN
Status: DISCONTINUED | OUTPATIENT
Start: 2022-04-29 | End: 2022-04-30 | Stop reason: HOSPADM

## 2022-04-29 RX ORDER — AMOXICILLIN 250 MG
2 CAPSULE ORAL 2 TIMES DAILY PRN
Status: DISCONTINUED | OUTPATIENT
Start: 2022-04-29 | End: 2022-04-30 | Stop reason: HOSPADM

## 2022-04-29 RX ADMIN — HYDROMORPHONE HYDROCHLORIDE 1 MG: 1 INJECTION, SOLUTION INTRAMUSCULAR; INTRAVENOUS; SUBCUTANEOUS at 16:59

## 2022-04-29 RX ADMIN — SODIUM CHLORIDE, POTASSIUM CHLORIDE, SODIUM LACTATE AND CALCIUM CHLORIDE 1000 ML: 600; 310; 30; 20 INJECTION, SOLUTION INTRAVENOUS at 17:00

## 2022-04-29 RX ADMIN — SODIUM CHLORIDE, POTASSIUM CHLORIDE, SODIUM LACTATE AND CALCIUM CHLORIDE 1000 ML: 600; 310; 30; 20 INJECTION, SOLUTION INTRAVENOUS at 17:06

## 2022-04-29 RX ADMIN — IOPAMIDOL 95 ML: 755 INJECTION, SOLUTION INTRAVENOUS at 19:13

## 2022-04-29 ASSESSMENT — ENCOUNTER SYMPTOMS
EYE DISCHARGE: 0
SORE THROAT: 0
DIARRHEA: 0
SHORTNESS OF BREATH: 0
VOMITING: 1
COUGH: 0
EYE PAIN: 0
WHEEZING: 0
NAUSEA: 1
WOUND: 0
ABDOMINAL PAIN: 1
NECK PAIN: 0
HEADACHES: 0
ABDOMINAL DISTENTION: 0
CHILLS: 0
NUMBNESS: 0
DIZZINESS: 0
FEVER: 0
RHINORRHEA: 0
BACK PAIN: 0
WEAKNESS: 0
HEMATURIA: 0

## 2022-04-29 ASSESSMENT — ACTIVITIES OF DAILY LIVING (ADL)
ADLS_ACUITY_SCORE: 14

## 2022-04-29 NOTE — ED PROVIDER NOTES
[facility] ([facility])  4/29/22 ED 7  4:01 PM   History     Chief Complaint   Patient presents with     Abdominal Pain     Nausea & Vomiting     The history is provided by the patient and medical records.     Andrew Lockwood is a 54 year old male well known to the Emergency Department with complex past medical history including poorly controlled type II diabetes mellitus, HIV on Biktarvy, prior exploratory laparotomies with lysis of adhesions x2, recurrent SBOs, housing insecurity, GERD on Protonix who presents with abdominal pain, nausea and vomiting. He states these symptoms are identical to prior episodes of SBO. He would be amenable to nausea and pain medications.     Epic records reviewed, he has had 5 prior hospitalizations this past year for small bowel obstructions.  These are generally managed nonoperatively and he tends to refuse NG tube.  His most recent admission for small bowel obstruction took place from 3/22-3/24/2022.  General surgery was consulted, there was initial plan to take him to the OR for laparotomy but he left AMA.  He is generally managed with IV fluids and pain medications.       Review of Systems   Constitutional: Negative for chills and fever.   HENT: Negative for congestion, rhinorrhea and sore throat.    Eyes: Negative for pain and discharge.   Respiratory: Negative for cough, shortness of breath and wheezing.    Cardiovascular: Negative for chest pain and leg swelling.   Gastrointestinal: Positive for abdominal pain, nausea and vomiting. Negative for abdominal distention and diarrhea.   Genitourinary: Negative for hematuria and urgency.   Musculoskeletal: Negative for back pain and neck pain.   Skin: Negative for rash and wound.   Neurological: Negative for dizziness, weakness, numbness and headaches.       Physical Exam   BP: (!) 155/90  Pulse: 92  Temp: 97.8  F (36.6  C)  Resp: 19  SpO2: 98 %  Physical Exam  Constitutional:       General: He is awake.  He is not in acute distress.     Appearance: Normal appearance.   HENT:      Head: Normocephalic and atraumatic.      Nose: Nose normal.      Mouth/Throat:      Mouth: Mucous membranes are dry.      Pharynx: Oropharynx is clear.   Eyes:      General: Lids are normal.      Extraocular Movements: Extraocular movements intact.      Conjunctiva/sclera: Conjunctivae normal.      Pupils: Pupils are equal, round, and reactive to light.   Cardiovascular:      Rate and Rhythm: Normal rate and regular rhythm.      Pulses: Normal pulses.      Heart sounds: Normal heart sounds. No murmur heard.    No friction rub. No gallop.   Pulmonary:      Effort: Pulmonary effort is normal. No respiratory distress.      Breath sounds: Normal breath sounds. No stridor. No wheezing or rhonchi.   Abdominal:      General: Abdomen is flat.      Palpations: Abdomen is soft.      Tenderness: There is generalized abdominal tenderness. There is guarding. There is no rebound.   Musculoskeletal:         General: Normal range of motion.      Cervical back: Full passive range of motion without pain, normal range of motion and neck supple.   Skin:     General: Skin is warm and dry.      Capillary Refill: Capillary refill takes less than 2 seconds.   Neurological:      General: No focal deficit present.      Mental Status: He is alert and oriented to person, place, and time. Mental status is at baseline.   Psychiatric:         Attention and Perception: Attention normal.         Mood and Affect: Mood normal.         Behavior: Behavior normal. Behavior is cooperative.       ED Course      Procedures          Results for orders placed or performed during the hospital encounter of 04/29/22   Abd/pelvis CT,  IV  contrast only TRAUMA / AAA     Status: None    Narrative    EXAMINATION: CT abdomen/pelvis with contrast, 4/29/2022.    INDICATION: Abdominal pain, acute, nonlocalized.    COMPARISON: CT abdomen 3/22/2022.    TECHNIQUE: Routine images from the level of  the diaphragm through the  pelvis were obtained with contrast.    Total DLP: 265 mGy*cm.    FINDINGS:    Mild focal fatty deposition along the falciform ligament. The  remainder of the liver parenchyma is homogenous. No suspicious hepatic  lesion. The gallbladder is nondistended. Small gallstone noted near  the gallbladder neck. No intrahepatic or extra hepatic biliary duct  dilatation. The spleen, pancreas and bilateral adrenal glands are  normal.    Bilaterally symmetrically enhancing kidneys. No renal lesion. No  hydronephrosis. No hydroureter. Bladder is unremarkable. Penile pump  reservoir noted adjacent to left aspect of the bladder.    Mild rectal stool burden. Multiple dilated loops of jejunum measuring  up to 4 cm in the central left abdomen. Gradual tapering with  transition point noted at the left upper quadrant (series 3 image 148  and series 5 images 40-45). Stomach is unremarkable.    The abdominal aorta and major abdominal arterial vessels are patent  and nonaneurysmal. No intra-abdominal lymphadenopathy.    No acute osseous lesion. No acute fracture.    Lung bases: No focal airspace opacity. Mild bibasilar atelectasis.      Impression    IMPRESSION:    Multiple dilated loops of jejunum in the left midabdomen. Gradual  tapering distally leading to a more focal transition point in the left  upper quadrant with decompression of the downstream small bowel.  Findings concerning for partial bowel obstruction. No pneumatosis,  portal venous gas, or free air.    I have personally reviewed the examination and initial interpretation  and I agree with the findings.    JOSE VITAL DO         SYSTEM ID:  [id number]   Comprehensive metabolic panel     Status: Abnormal   Result Value Ref Range    Sodium 135 133 - 144 mmol/L    Potassium 3.6 3.4 - 5.3 mmol/L    Chloride 106 94 - 109 mmol/L    Carbon Dioxide (CO2) 16 (L) 20 - 32 mmol/L    Anion Gap 13 3 - 14 mmol/L    Urea Nitrogen 18 7 - 30 mg/dL    Creatinine  0.69 0.66 - 1.25 mg/dL    Calcium 9.9 8.5 - 10.1 mg/dL    Glucose 259 (H) 70 - 99 mg/dL    Alkaline Phosphatase 141 40 - 150 U/L    AST 17 0 - 45 U/L    ALT 28 0 - 70 U/L    Protein Total 8.9 (H) 6.8 - 8.8 g/dL    Albumin 4.0 3.4 - 5.0 g/dL    Bilirubin Total 0.8 0.2 - 1.3 mg/dL    GFR Estimate >90 >60 mL/min/1.73m2   Lipase     Status: Normal   Result Value Ref Range    Lipase 144 73 - 393 U/L   Lactic acid whole blood     Status: Abnormal   Result Value Ref Range    Lactic Acid 2.1 (H) 0.7 - 2.0 mmol/L   INR     Status: Normal   Result Value Ref Range    INR 0.96 0.85 - 1.15   Partial thromboplastin time     Status: Normal   Result Value Ref Range    aPTT 29 22 - 38 Seconds   CBC with platelets and differential     Status: None   Result Value Ref Range    WBC Count 10.9 4.0 - 11.0 10e3/uL    RBC Count 5.55 4.40 - 5.90 10e6/uL    Hemoglobin 14.9 13.3 - 17.7 g/dL    Hematocrit 46.4 40.0 - 53.0 %    MCV 84 78 - 100 fL    MCH 26.8 26.5 - 33.0 pg    MCHC 32.1 31.5 - 36.5 g/dL    RDW 13.2 10.0 - 15.0 %    Platelet Count 354 150 - 450 10e3/uL    % Neutrophils 75 %    % Lymphocytes 17 %    % Monocytes 5 %    % Eosinophils 1 %    % Basophils 1 %    % Immature Granulocytes 1 %    NRBCs per 100 WBC 0 <1 /100    Absolute Neutrophils 8.3 1.6 - 8.3 10e3/uL    Absolute Lymphocytes 1.9 0.8 - 5.3 10e3/uL    Absolute Monocytes 0.6 0.0 - 1.3 10e3/uL    Absolute Eosinophils 0.1 0.0 - 0.7 10e3/uL    Absolute Basophils 0.1 0.0 - 0.2 10e3/uL    Absolute Immature Granulocytes 0.1 <=0.4 10e3/uL    Absolute NRBCs 0.0 10e3/uL   [place] Draw     Status: None    Narrative    The following orders were created for panel order [place] Draw.  Procedure                               Abnormality         Status                     ---------                               -----------         ------                     Extra Blood Culture Bottle[[id number]]                       Final result               Extra Red Top Tube[[id number]]                                Final result                 Please view results for these tests on the individual orders.   Extra Blood Culture Bottle     Status: None   Result Value Ref Range    Hold Specimen JIC    Extra Red Top Tube     Status: None   Result Value Ref Range    Hold Specimen JIC    CBC with platelets differential     Status: None    Narrative    The following orders were created for panel order CBC with platelets differential.  Procedure                               Abnormality         Status                     ---------                               -----------         ------                     CBC with platelets and d...[[id number]]                      Final result                 Please view results for these tests on the individual orders.     Medications   lactated ringers BOLUS 1,000 mL (0 mLs Intravenous Stopped 4/29/22 1923)     Followed by   lactated ringers BOLUS 1,000 mL (0 mLs Intravenous Stopped 4/29/22 1923)     Followed by   lactated ringers infusion (has no administration in time range)   ondansetron (ZOFRAN) injection 4 mg (has no administration in time range)   HYDROmorphone (DILAUDID) injection 1 mg (1 mg Intravenous Given 4/29/22 1659)   iopamidol (ISOVUE-370) solution 95 mL (95 mLs Intravenous Given 4/29/22 1913)   sodium chloride (PF) 0.9% PF flush 74 mL (74 mLs Intravenous Given 4/29/22 1914)        Assessments & Plan (with Medical Decision Making)   This is a 54 year old gentleman who presents to the ED for abdominal pain. Given the patient's history and examination my suspicion is that this pain is secondary to partial versus full bowel obstruction. Other possible etiologies include appendicitis, cholecystitis, PUD, constipation, pancreatitis, diverticulitis, bowel obstruction or perforation, mesenteric ischemia, kidney stone, AAA, testicular torsion.   Evaluation and management will include CBC, CMP, lipase, lactate, PT/INR, abdominal CT, as well as symptom management. Disposition pending  results and patient course.     Blood work no elevation of acute inflammatory markers, lactate is elevated, concerning for possible ischemic bowel process.  No noted anemia, patient is hyperglycemic, no acute correction is indicated, patient is not have an anion gap, reassuring for DKA.    CT concerning for partial bowel obstruction.    We will speak with her surgical colleagues    Patient signed out to Dr. Mauro for further care at the conclusion of my shift.    I have reviewed the nursing notes. I have reviewed the findings, diagnosis, plan and need for follow up with the patient.    New Prescriptions    No medications on file       Final diagnoses:   Partial intestinal obstruction, unspecified cause (H)     I, Yadira Boss, am serving as a trained medical scribe to document services personally performed by Russel Millard MD based on the provider's statements to me on April 29, 2022.  This document has been checked and approved by the attending provider.    I, Russel Millard MD, was physically present and have reviewed and verified the accuracy of this note documented by Yadira Boss, medical scribe.      Russel Millard MD       [facility] EMERGENCY DEPARTMENT  4/29/2022     Russel Millard MD  04/29/22 2012

## 2022-04-29 NOTE — ED TRIAGE NOTES
Pt BIBA with 9/10 abdominal pain with nausea and vomiting. He refused everything that EMS offered to him and refuses triage RN to start IV/obtain labs. LBM was last night and he reports it was diarrhea.  He had a SBO last month.   Pt unwilling to tell RN much more.      Triage Assessment     Row Name 04/29/22 1551       Triage Assessment (Adult)    Airway WDL WDL       Respiratory WDL    Respiratory WDL WDL       Skin Circulation/Temperature WDL    Skin Circulation/Temperature WDL WDL       Cardiac WDL    Cardiac WDL WDL       Peripheral/Neurovascular WDL    Peripheral Neurovascular WDL WDL    Capillary Refill, General less than/equal to 3 secs       Cognitive/Neuro/Behavioral WDL    Cognitive/Neuro/Behavioral WDL WDL       Sheyla Coma Scale    Best Eye Response 4-->(E4) spontaneous    Best Motor Response 6-->(M6) obeys commands    Best Verbal Response 5-->(V5) oriented    [place] Coma Scale Score 15

## 2022-04-30 ENCOUNTER — APPOINTMENT (OUTPATIENT)
Dept: GENERAL RADIOLOGY | Facility: CLINIC | Age: 57
End: 2022-04-30
Payer: COMMERCIAL

## 2022-04-30 VITALS
OXYGEN SATURATION: 99 % | SYSTOLIC BLOOD PRESSURE: 127 MMHG | DIASTOLIC BLOOD PRESSURE: 92 MMHG | BODY MASS INDEX: 26.61 KG/M2 | RESPIRATION RATE: 16 BRPM | WEIGHT: 155 LBS | HEART RATE: 87 BPM | TEMPERATURE: 97.6 F

## 2022-04-30 LAB
ALBUMIN SERPL-MCNC: 3.1 G/DL (ref 3.4–5)
ALP SERPL-CCNC: 91 U/L (ref 40–150)
ALT SERPL W P-5'-P-CCNC: 19 U/L (ref 0–70)
ANION GAP SERPL CALCULATED.3IONS-SCNC: 6 MMOL/L (ref 3–14)
AST SERPL W P-5'-P-CCNC: 15 U/L (ref 0–45)
BILIRUB SERPL-MCNC: 0.8 MG/DL (ref 0.2–1.3)
BUN SERPL-MCNC: 15 MG/DL (ref 7–30)
C DIFF TOX B STL QL: NEGATIVE
CALCIUM SERPL-MCNC: 9.1 MG/DL (ref 8.5–10.1)
CHLORIDE BLD-SCNC: 111 MMOL/L (ref 94–109)
CO2 SERPL-SCNC: 24 MMOL/L (ref 20–32)
CREAT SERPL-MCNC: 0.65 MG/DL (ref 0.66–1.25)
ERYTHROCYTE [DISTWIDTH] IN BLOOD BY AUTOMATED COUNT: 13.3 % (ref 10–15)
GFR SERPL CREATININE-BSD FRML MDRD: >90 ML/MIN/1.73M2
GLUCOSE BLD-MCNC: 149 MG/DL (ref 70–99)
GLUCOSE BLDC GLUCOMTR-MCNC: 135 MG/DL (ref 70–99)
GLUCOSE BLDC GLUCOMTR-MCNC: 152 MG/DL (ref 70–99)
GLUCOSE BLDC GLUCOMTR-MCNC: 160 MG/DL (ref 70–99)
GLUCOSE BLDC GLUCOMTR-MCNC: 170 MG/DL (ref 70–99)
GLUCOSE BLDC GLUCOMTR-MCNC: 372 MG/DL (ref 70–99)
HCT VFR BLD AUTO: 39.3 % (ref 40–53)
HGB BLD-MCNC: 12.5 G/DL (ref 13.3–17.7)
HOLD SPECIMEN: NORMAL
LACTATE SERPL-SCNC: 0.8 MMOL/L (ref 0.7–2)
MCH RBC QN AUTO: 26.7 PG (ref 26.5–33)
MCHC RBC AUTO-ENTMCNC: 31.8 G/DL (ref 31.5–36.5)
MCV RBC AUTO: 84 FL (ref 78–100)
PLATELET # BLD AUTO: 318 10E3/UL (ref 150–450)
POTASSIUM BLD-SCNC: 3.5 MMOL/L (ref 3.4–5.3)
PROT SERPL-MCNC: 6.9 G/DL (ref 6.8–8.8)
RBC # BLD AUTO: 4.68 10E6/UL (ref 4.4–5.9)
SODIUM SERPL-SCNC: 141 MMOL/L (ref 133–144)
WBC # BLD AUTO: 7.2 10E3/UL (ref 4–11)

## 2022-04-30 PROCEDURE — 96361 HYDRATE IV INFUSION ADD-ON: CPT | Performed by: STUDENT IN AN ORGANIZED HEALTH CARE EDUCATION/TRAINING PROGRAM

## 2022-04-30 PROCEDURE — 80053 COMPREHEN METABOLIC PANEL: CPT | Performed by: STUDENT IN AN ORGANIZED HEALTH CARE EDUCATION/TRAINING PROGRAM

## 2022-04-30 PROCEDURE — 99253 IP/OBS CNSLTJ NEW/EST LOW 45: CPT | Mod: GC | Performed by: SURGERY

## 2022-04-30 PROCEDURE — 83605 ASSAY OF LACTIC ACID: CPT | Performed by: STUDENT IN AN ORGANIZED HEALTH CARE EDUCATION/TRAINING PROGRAM

## 2022-04-30 PROCEDURE — 250N000012 HC RX MED GY IP 250 OP 636 PS 637: Performed by: STUDENT IN AN ORGANIZED HEALTH CARE EDUCATION/TRAINING PROGRAM

## 2022-04-30 PROCEDURE — 36415 COLL VENOUS BLD VENIPUNCTURE: CPT | Performed by: STUDENT IN AN ORGANIZED HEALTH CARE EDUCATION/TRAINING PROGRAM

## 2022-04-30 PROCEDURE — 74018 RADEX ABDOMEN 1 VIEW: CPT

## 2022-04-30 PROCEDURE — 85027 COMPLETE CBC AUTOMATED: CPT | Performed by: STUDENT IN AN ORGANIZED HEALTH CARE EDUCATION/TRAINING PROGRAM

## 2022-04-30 PROCEDURE — 87493 C DIFF AMPLIFIED PROBE: CPT | Performed by: STUDENT IN AN ORGANIZED HEALTH CARE EDUCATION/TRAINING PROGRAM

## 2022-04-30 PROCEDURE — 99238 HOSP IP/OBS DSCHRG MGMT 30/<: CPT | Mod: GC | Performed by: FAMILY MEDICINE

## 2022-04-30 PROCEDURE — 258N000003 HC RX IP 258 OP 636: Performed by: STUDENT IN AN ORGANIZED HEALTH CARE EDUCATION/TRAINING PROGRAM

## 2022-04-30 PROCEDURE — 74018 RADEX ABDOMEN 1 VIEW: CPT | Mod: 26 | Performed by: RADIOLOGY

## 2022-04-30 PROCEDURE — 250N000013 HC RX MED GY IP 250 OP 250 PS 637: Performed by: STUDENT IN AN ORGANIZED HEALTH CARE EDUCATION/TRAINING PROGRAM

## 2022-04-30 RX ORDER — NICOTINE POLACRILEX 4 MG
15-30 LOZENGE BUCCAL
Status: DISCONTINUED | OUTPATIENT
Start: 2022-04-30 | End: 2022-04-30 | Stop reason: HOSPADM

## 2022-04-30 RX ORDER — DEXTROSE MONOHYDRATE 25 G/50ML
25-50 INJECTION, SOLUTION INTRAVENOUS
Status: DISCONTINUED | OUTPATIENT
Start: 2022-04-30 | End: 2022-04-30 | Stop reason: HOSPADM

## 2022-04-30 RX ORDER — PANTOPRAZOLE SODIUM 40 MG/1
40 TABLET, DELAYED RELEASE ORAL
Status: DISCONTINUED | OUTPATIENT
Start: 2022-04-30 | End: 2022-04-30 | Stop reason: HOSPADM

## 2022-04-30 RX ADMIN — SODIUM CHLORIDE, POTASSIUM CHLORIDE, SODIUM LACTATE AND CALCIUM CHLORIDE: 600; 310; 30; 20 INJECTION, SOLUTION INTRAVENOUS at 00:36

## 2022-04-30 RX ADMIN — BICTEGRAVIR SODIUM, EMTRICITABINE, AND TENOFOVIR ALAFENAMIDE FUMARATE 1 TABLET: 50; 200; 25 TABLET ORAL at 07:43

## 2022-04-30 RX ADMIN — INSULIN ASPART 4 UNITS: 100 INJECTION, SOLUTION INTRAVENOUS; SUBCUTANEOUS at 18:28

## 2022-04-30 RX ADMIN — INSULIN ASPART 1 UNITS: 100 INJECTION, SOLUTION INTRAVENOUS; SUBCUTANEOUS at 03:00

## 2022-04-30 RX ADMIN — INSULIN ASPART 1 UNITS: 100 INJECTION, SOLUTION INTRAVENOUS; SUBCUTANEOUS at 05:52

## 2022-04-30 RX ADMIN — INSULIN ASPART 1 UNITS: 100 INJECTION, SOLUTION INTRAVENOUS; SUBCUTANEOUS at 11:04

## 2022-04-30 RX ADMIN — HYDROMORPHONE HYDROCHLORIDE 2 MG: 2 TABLET ORAL at 10:50

## 2022-04-30 RX ADMIN — HYDROMORPHONE HYDROCHLORIDE 2 MG: 2 TABLET ORAL at 18:36

## 2022-04-30 RX ADMIN — SODIUM CHLORIDE, POTASSIUM CHLORIDE, SODIUM LACTATE AND CALCIUM CHLORIDE: 600; 310; 30; 20 INJECTION, SOLUTION INTRAVENOUS at 10:58

## 2022-04-30 RX ADMIN — INSULIN DETEMIR 10 UNITS: 100 INJECTION, SOLUTION SUBCUTANEOUS at 03:00

## 2022-04-30 RX ADMIN — HYDROMORPHONE HYDROCHLORIDE 4 MG: 2 TABLET ORAL at 02:09

## 2022-04-30 RX ADMIN — DIATRIZOATE MEGLUMINE AND DIATRIZOATE SODIUM 75 ML: 660; 100 SOLUTION ORAL; RECTAL at 00:30

## 2022-04-30 RX ADMIN — PANTOPRAZOLE SODIUM 40 MG: 40 TABLET, DELAYED RELEASE ORAL at 07:43

## 2022-04-30 ASSESSMENT — ACTIVITIES OF DAILY LIVING (ADL)
ADLS_ACUITY_SCORE: 14

## 2022-04-30 NOTE — PROGRESS NOTES
[facility]    Progress Note - [facility] Service       Date of Admission:  4/29/2022    Assessment & Plan           Andrew Lockwood is a 54 year old male with PMHx of DM2 on insulin, recurrent SBOs (w/ recent admission 3/14-3/17 and 3/22-/324) s/p ANASTASIIA (2016 and 2018), appendectomy (2018), GERD, HIV on Biktarvy (w/ hx  of CNS toxoplasmosis) who presented on 4/30 for abdominal pain w/ CT findings consistent with PSBO.     Today  - Gastrograffin results shows bowel patency into rectum -> trial ADAT to FLD  - Order CDiff PCR considering frequenet loose stool output    #Recurrent SBO  Onset of diffuse abdominal pain 4/29 afternoon with nausea/vomiting which has since resolved. No fevers/chills recently. CT A/P findings of multiple dilated loops of jejunum with transition point in the left upper quadrant, consistent with partial SBO.  >>>>  - Gen surg consulted, appreciate recs   -> OK to trial diet    -> Monitor for one more day   - Pain   -> PO dilaudid 2-4mg q4h   -> IV dilaudid for breakthrough 0.3-0.5mg q2h   - LR 100cc/hr  - Zofran / Compazine PRN   - Serial abdominal exams Q6hr     #Lactic acidosis  Lactic acid mildly elevated to 2.1. Slightly tachycardic, not meeting SIRS criteria. Likely secondary to dehydration from SBO/diarrhea rather than ischemia.   >>>>  - Pending AM Lactate  - Pending blood cultures      #T2DM, hx of poor control  #Insulin Use  Difficulties w/ outpatient control due to lack of primary follow up and access to monitoring supplies. PTA insulin regimen included Levemir 20U BID and Novolug 5U TID. He was started on additional Jardiance 25mg and Metformin 500mg daily after his 3/17 hospitalization. Patient notes that he has not been taking his insulin for the past month.   >>>>  - Levemir 10U BID - will adjust as necessary   - Hold PTA Jardiance and Metformin d/t SBO status at this time  - MISS   - hypoglycemia protocol     #Episodes  of loose stools  #Possible hx of SIBO  Multiple references to SIBO in patient's chart dating back several years based on patient's sx of recurrent diarrhea and bloating; has not had carbohydrate breath test for official dx. During last 2 hospitalizations in March 2022, patient was placed on BID ciprofloxacin for possible SIBO. Course was supposed to be 14 days, but patient compliance outside of hospital is unclear. Patient is endorsing large volume loose stools yesterday and today, which could be from SIBO or possible C.Diff given recent abx course.  >>>>  - Obtain CDiff PCR     #Housing insecurity  #Methamphetamin Use  Patient last known to be living in a shelter - [facility]. Patient has noted prior that he has declined operative cares d/t lack of stable housing. He states he also smokes methamphetamines - last usage 2 weeks ago. Has used for 5 years.  >>>>   - Declined SW consult at this time  - Declined Addiction Medicine / ChemDep at this time      --------------------------------------------------------------------------------------------------------------------------     Chronic/Stable     #HIV  #Hx of Toxoplasmosis Encephalopathy   Stable on Biktarvy, which patient has brought with him. Last CD4 count 1 month ago: 489; no ppx needed.   >>>>  - PTA Biktarvy      #GERD  - PTA protonix 40 daily     Diet: Advance Diet as Tolerated: Full Liquid Diet    DVT Prophylaxis: Pneumatic Compression Devices  Potts Catheter: Not present  Fluids: LR@100  Central Lines: None  Cardiac Monitoring: None  Code Status: Full Code      Disposition Plan   Expected Discharge: 05/01/2022   Anticipated discharge location:  Awaiting care coordination huddle  Delays:           The patient's care was discussed with the Attending Physician, Dr. Ramírez.    DO [facility]  [facility]  Securely message with the Vocera Web Console (learn more  here)  Text page via [facility] Paging/Directory   Please see signed in provider for up to date coverage information      Clinically Significant Risk Factors Present on Admission             # Hypoalbuminemia: Albumin = 3.1 g/dL (Ref range: 3.4 - 5.0 g/dL) on admission, will monitor as appropriate      # Diabetes, type II: last A1C 11.2 % (Ref range: 0.0 - 5.6 %)      ______________________________________________________________________    Interval History   NAEO. Pain is doing better. MAR checked - has used PRN oral Dilaudid x1 since admit. He is noting watery stools every 20 minutes. Denies nausea / vomit / bloody stool.     Data reviewed today: I reviewed all medications, new labs and imaging results over the last 24 hours. I personally reviewed the abdominal x-ray image(s) showing patency of the gastrograffin solution into the rectum..    Physical Exam   Vital Signs: Temp: 97.9  F (36.6  C) Temp src: Oral BP: 126/74 Pulse: 68   Resp: 16 SpO2: 99 % O2 Device: None (Room air)    Weight: 155 lbs 0 oz  Constitutional: awake, alert, cooperative, no apparent distress, and appears stated age  Respiratory: No increased work of breathing, good air exchange, clear to auscultation bilaterally, no crackles or wheezing  Cardiovascular: Normal apical impulse, regular rate and rhythm, normal S1 and S2, no S3 or S4, and no murmur noted  GI: No pain on palpation, no rebound, no guarding  Skin: Scars noted on abdomen   Neuropsychiatric: General: normal, calm and normal eye contact  Level of consciousness: alert / normal  Affect: normal  Orientation: oriented to self, place, time and situation  Memory and insight: normal, memory for past and recent events intact and thought process normal

## 2022-04-30 NOTE — PLAN OF CARE
/76   Pulse 82   Temp 97.6  F (36.4  C) (Oral)   Resp 16   Wt 70.3 kg (155 lb)   SpO2 97%   BMI 26.61 kg/m      Neuro: A&Ox4.   Cardiac: AVSS.   Respiratory: On RA sating 97%  GI/: Voiding in bedside urinal. No BM  Diet/appetite: Tolerating clears and full liquid diet. Eating popsicles, vanilla ice cream and drinking gingerale. No c/o nausea  Activity: Up in room independently   Pain: Dilaudid po 2mg given x1 for abdomen and back pain with relief  Skin: No new deficits noted.  LDA's: R PIV infusing LR at 100ml/hr     Still need stool for C diff    Plan: Continue with POC. Notify primary team with changes.

## 2022-04-30 NOTE — H&P
[facility]    History and Physical - [facility]       Date of Admission:  4/29/2022    Assessment & Plan      Andrew Lockwood is a 54 year old male with PMHx of IDDM2, GERD, HIV c/b CNS toxoplasmosis, s/p appendectomy, ex lap w/ lysis of adhesions (2016, 2018), recurrent SBOs with recent admission 3/14-3/17/22 and 3/22-3/24 for recurrent SBOs, managed conservatively. Presenting with recurrent abdominal pain and CT findings consistent with new SBO.     #Recurrent SBO  Onset of diffuse abdominal pain 4/29 afternoon with nausea/vomiting which has since resolved. No fevers/chills recently. CT A/P findings of multiple dilated loops of jejunum with transition point in the left upper quadrant, consistent with partial SBO. No pneumatosis intestinalis, portal venous gas or free air. No peritonitic signs on physical exam.   - pain control: PO dilaudid 2-4mg q4h, IV dilaudid for breakthrough 0.3-0.5mg q2h   - IV fluids: LR 100cc/hr  - NPO except meds  - gastrografin challenge tonight with x-ray timed for 8 hours later  - zofran + compazine for nausea  - general surgery consulted: conservative management, no decompression  - serial abdominal exams - q6hrs    #Lactic acidosis  Lactic acid mildly elevated to 2.1. Slightly tachycardic, not meeting SIRS criteria. Likely secondary to dehydration from SBO/diarrhea rather than ischemia.   - repeat lactic acid ordered for AM   - blood cultures ordered in the ED, f/u     #Poorly controlled type II DM  Home meds include jardiance 25mg, metformin 500mg daily in addition to levemir 20u BID and novolog 5u TID with meals. Patient notes that he has not been taking his insulin for the past month.   - continue Levemir at reduced dose of 10u BID  - MISS q4h  - hypoglycemia protocol    #Episodes of loose stools  #Possible hx of SIBO  Multiple references to SIBO in patient's chart dating back several years based on patient's sx  of recurrent diarrhea and bloating; has not had carbohydrate breath test for official dx. During last 2 hospitalizations in March 2022, patient was placed on BID ciprofloxacin for possible SIBO. Course was supposed to be 14 days, but patient compliance outside of hospital is unclear. Patient is endorsing large volume loose stools yesterday and today, which could be from SIBO or possible C.Diff given recent abx course. Consider C. Diff testing if loose stools persist.     #Housing insecurity  Patient last known to be living in a shelter, though current living situation was difficult to ascertain due to patient becoming agitated.   - Offer social work consult, although has prev declined    ---------------------------------------------------------------------------------------------------------------------------------------    Chronic/Stable    #HIV  Stable on Biktarvy, which patient has brought with him. Last CD4 count 1 month ago: 489; no ppx needed.     #GERD  - PTA protonix 40 daily       Diet: NPO for Medical/Clinical Reasons Except for: Meds  DVT Prophylaxis: Pneumatic Compression Devices  Potts Catheter: Not present  Fluids: 100cc/hr LR   Central Lines: None  Cardiac Monitoring: None  Code Status: Full Code  Covid Status: negative       Disposition Plan   Expected Discharge: 2-3 days   Anticipated discharge location:  Awaiting care coordination huddle  Delays: currently on IV pain medications for breakthrough        The patient's care was discussed with the Attending Physician, Dr. Jurado .    Romi Palma MD  [facility]  [facility]  Securely message with the Vocera Web Console (learn more here)  Text page via [facility] Paging/Directory   Please see signed in provider for up to date coverage information    ______________________________________________________________________    Chief Complaint   Abdominal pain     History of Present Illness    Andrew Lockwood is a 54 year old male with complex past medical history including poorly controlled type II diabetes mellitus, HIV on Biktarvy, prior exploratory laparotomies with lysis of adhesions x2 (2016, 2018), recurrent SBOs, housing insecurity, GERD on Protonix who presents with abdominal pain, nausea and vomiting of 1 day duration. Nausea/vomiting have since resolved. He states these symptoms are similar to prior episodes of SBO. However, he is noting that he had a bowel movement yesterday and today, which have both been loose. He notes that in previous episodes of SBO, he has not had bowel movements.      He has had 5 prior hospitalizations this past year for small bowel obstructions.  These are generally managed nonoperatively and he tends to refuse NG tube.  His most recent admission for small bowel obstruction took place from 3/22-3/24/2022.  General surgery was consulted, there was initial plan to take him to the OR for laparotomy but he left AMA.  He is generally managed with IV fluids and pain medications.     He denies any recent fevers/chills, chest pain, or shortness of breath.     Review of Systems     ROS: 10 point ROS neg other than the symptoms noted above in the HPI.    Past Medical History    I have reviewed this patient's medical history and updated it with pertinent information if needed.   Past Medical History:   Diagnosis Date     AIDS (H)      Allergic rhinitis due to other allergen     DNS     Anal dysplasia      Chronic abdominal pain      CNS toxoplasmosis (H)      Diabetes type 2, controlled (H)      GERD (gastroesophageal reflux disease)      History of seizure      History of substance abuse (H)      HIV (human immunodeficiency virus infection) (H)      HLD (hyperlipidemia)      Lung nodules      Periungual wart      PTSD (post-traumatic stress disorder)      Sleep apnea     doesn't use CPAP        Past Surgical History   I have reviewed this patient's surgical history and updated  it with pertinent information if needed.  Past Surgical History:   Procedure Laterality Date     ANOSCOPY N/A 9/9/2020    Procedure: Exam Under Anesthesia, ANOSCOPY, fulgeration of rectal fissures with Rectal Biopsies;  Surgeon: Thanh Lundberg MD;  Location: [facility] OR     COLONOSCOPY Left 1/22/2016    Procedure: COMBINED COLONOSCOPY, SINGLE OR MULTIPLE BIOPSY/POLYPECTOMY BY BIOPSY;  Surgeon: Clark Saini MD;  Location: [facility] GI     HC EXPLORE UNDESC TESTIS,INGUIN/SCROTAL       LAPAROSCOPIC APPENDECTOMY N/A 1/31/2018    Procedure: LAPAROSCOPIC APPENDECTOMY;  LAPAROSCOPIC APPENDECTOMY;  Surgeon: Dawn Holt MD;  Location: [facility] OR     LAPAROSCOPY DIAGNOSTIC (GENERAL) N/A 7/26/2016    Procedure: LAPAROSCOPY DIAGNOSTIC (GENERAL);  Surgeon: Susannah Arriaga MD;  Location: [facility] OR     LAPAROSCOPY DIAGNOSTIC (GENERAL) N/A 4/16/2018    Procedure: LAPAROSCOPY DIAGNOSTIC (GENERAL);  Diagnostic laparoscopy and lysis of adhesions;  Surgeon: Prince Dowling MD;  Location: [facility] OR     OPTICAL TRACKING SYSTEM CRANIOTOMY, EXCISE TUMOR, COMBINED Left 4/10/2015    Procedure: COMBINED OPTICAL TRACKING SYSTEM CRANIOTOMY, EXCISE TUMOR;  Surgeon: Mirlande Colmenares MD;  Location: [facility] OR     REPAIR GAMEKEEPER'S THUMB Right 12/2/2016    Procedure: REPAIR LIGAMENT ULNAR COLLATERAL THUMB (GAMEKEEPER'S);  Surgeon: Evin Zamorano MD;  Location: [facility] OR     ZZC NONSPECIFIC PROCEDURE      right forearm fracture       Social History   I have reviewed this patient's social history and updated it with pertinent information if needed. Andrew Lockwood  reports that he has been smoking cigarettes. He has a 10.00 pack-year smoking history. He has quit using smokeless tobacco. He reports previous drug use. Drugs: Marijuana and Methamphetamines. He reports that he does not drink alcohol.    Family History   I have reviewed this patient's family history and updated it with pertinent information if needed.  Family History    Problem Relation Age of Onset     Diabetes Brother      Diabetes Father      Alzheimer Disease Father      Unknown/Adopted Mother      Diabetes Paternal Grandfather      Cancer No family hx of         no skin cancer     Skin Cancer No family hx of         no famiy hx of skin cancer     Glaucoma No family hx of      Macular Degeneration No family hx of        Prior to Admission Medications   Prior to Admission Medications   Prescriptions Last Dose Informant Patient Reported? Taking?   Alcohol Swabs PADS  Self No No   Sig: Use to swab the area of the injection or todd as directed Per insurance coverage   NOVOLOG FLEXPEN 100 UNIT/ML soln   No No   Sig: Inject 5 Units Subcutaneous 3 times daily (with meals)   SENNA-docusate sodium (SENNA S) 8.6-50 MG tablet   No No   Sig: Take 1 tablet by mouth 2 times daily as needed (constipation)   STATIN NOT PRESCRIBED (INTENTIONAL)  Self No No   Sig: Please choose reason not prescribed, below   Sharps Container MISC  Self No No   Sig: Use as directed to dispose of needles, lancets and other sharps. Per Insurance coverage   bictegravir-emtricitabine-tenofovir (BIKTARVY) -25 MG per tablet   No No   Sig: Take 1 tablet by mouth daily   blood glucose (NO BRAND SPECIFIED) test strip  Self No No   Sig: Use to test blood sugar 4 times daily or as directed. To accompany:whatever is covered   blood glucose calibration (NO BRAND SPECIFIED) solution  Self No No   Sig: Used to calibrate the blood glucose monitor as needed and as directed.  To accompany  blood glucose brands per insurance coverage   blood glucose monitoring (NO BRAND SPECIFIED) meter device kit  Self No No   Sig: Use as directed. Per insurance coverage.   ciprofloxacin (CIPRO) 500 MG tablet   No No   Sig: Take 1 tablet (500 mg) by mouth 2 times daily   empagliflozin (JARDIANCE) 25 MG TABS tablet   No No   Sig: Take 1 tablet (25 mg) by mouth daily   glucose 40 % (400 mg/mL) gel  Self No No   Sig: 15 g every 15 minutes by  "mouth as needed for low blood sugar. Oral gel is preferable for conscious and able to swallow patient.   insulin detemir (LEVEMIR PEN) 100 UNIT/ML pen  Self No No   Sig: Inject 20 Units Subcutaneous 2 times daily   insulin pen needle (BD SCOTT U/F) 32G X 4 MM miscellaneous   No No   Sig: USE 3 TO 4 NEEDLES DAILY. Follow up visit needed. Call  to schedule.   metFORMIN (GLUCOPHAGE-XR) 500 MG 24 hr tablet   No No   Sig: Take 1 tablet (500 mg) by mouth daily (with dinner)   pantoprazole (PROTONIX) 40 MG EC tablet  Self No No   Sig: Take 1 tablet (40 mg) by mouth daily   polyethylene glycol (MIRALAX) 17 GM/Dose powder   No No   Sig: Take 17 g by mouth daily   simethicone (MYLICON) 80 MG chewable tablet   No No   Sig: Take 1 tablet (80 mg) by mouth every 6 hours as needed for cramping   thin (NO BRAND SPECIFIED) lancets  Self No No   Sig: Use with lanceting device. To accompany: Test 4 times daily with whatever is covered      Facility-Administered Medications: None     Allergies   Allergies   Allergen Reactions     Metformin      Abdominal pain     Tylenol [Acetaminophen] Itching     Dulaglutide Rash     Insulin Lispro Rash     Patient reported     Penicillin V Other (See Comments) and Rash     Diffuse maculopapular rash + feels \"high\", per pt.        Physical Exam   Vital Signs: Temp: 97.8  F (36.6  C) Temp src: Oral BP: (!) 140/87 Pulse: 103   Resp: 19 SpO2: 99 %      Weight: 0 lbs 0 oz    Physical Exam  Vitals reviewed.   Constitutional:       General: He is not in acute distress.  Eyes:      Conjunctiva/sclera: Conjunctivae normal.   Cardiovascular:      Rate and Rhythm: Normal rate and regular rhythm.      Heart sounds: Normal heart sounds.   Pulmonary:      Effort: Pulmonary effort is normal.      Breath sounds: Normal breath sounds.   Abdominal:      Palpations: Abdomen is soft.      Tenderness: There is abdominal tenderness (left sided abdominal tenderness). There is no guarding or rebound. "   Musculoskeletal:         General: No swelling.   Skin:     General: Skin is warm and dry.   Neurological:      Mental Status: He is alert.   Psychiatric:         Mood and Affect: Affect is angry.         Behavior: Behavior is agitated.           Data   Data reviewed today: I reviewed all medications, new labs and imaging results over the last 24 hours.   Recent Labs   Lab 04/29/22  1705   WBC 10.9   HGB 14.9   MCV 84      INR 0.96      POTASSIUM 3.6   CHLORIDE 106   CO2 16*   BUN 18   CR 0.69   ANIONGAP 13   LINDA 9.9   *   ALBUMIN 4.0   PROTTOTAL 8.9*   BILITOTAL 0.8   ALKPHOS 141   ALT 28   AST 17   LIPASE 144

## 2022-04-30 NOTE — PROGRESS NOTES
General Surgery Progress Note  [facility]  April 30, 2022      ASSESSMENT/PLAN:     54 year old male with poorly controlled DM, HIV c/b neuro toxo, history of recurrent SBO, prior diagnostic laparoscopy/ANASTASIIA (2016, 2018), and housing insecurity who presented with abdominal pain and obstipation 4/29. Initial evaluation and imaging with evidence of pSBO and gastric distensionthough NG tube placement per patient preference. Attempted GGC 4/30 with evidence of contrast in the colon.     Today, feeling improved, no significant nausea. Remains TTP but soft in the abdomen. Given the above, would continue to monitor for at least one more day but no surgical indications at this time. Can trial limited PO intake and monitor.     Patient was seen and discussed on rounds with Chief Resident,, who will discuss with staff.    Evin Hernandez MD, MS  PGY-1, General Surgery  Pager: [phone]    --------------------------------------------------------------------------------------------------------------------------------------    SUBJECTIVE  No acute events overnight     Tolerated no NG tube well - No significant nausea. No Vomiting. Abdominal pain persists though improved. GGC tolerated well.     No other questions or concerns at this time.       OBJECTIVE:    Vitals:  Temp:  [97.8  F (36.6  C)] 97.8  F (36.6  C)  Pulse:  [] 70  Resp:  [18-19] 18  BP: (119-155)/(87-95) 130/88  SpO2:  [98 %-100 %] 98 %    I/O:  No intake/output data recorded.    Physical Exam:  Gen: AAOx3, NAD/NTA  Pulm: NLB on RA  Cards: RRR by RP, no sig lower extremity edema  Abd: Soft, left sided TTP but soft. Improved from yesterday.  Non-distended. no guard/rebound,   Ext:  Warm and well-perfused    Laboratory Studies:    [facility]   Lab 04/30/22  0747 04/30/22  0551 04/30/22  0133 04/29/22  1705   NA  --   --   --  135   POTASSIUM  --   --   --  3.6   CHLORIDE  --   --   --  106   CO2  --   --   --  16*   BUN  --   --   --   18   CR  --   --   --  0.69   * 152* 170* 259*     CBC  Recent Labs   Lab 04/30/22  0730 04/29/22  1705   WBC 7.2 10.9   HGB 12.5* 14.9   HCT 39.3* 46.4    354     Radiology: Gastrografin Challenge 4/30 - Surgery team reviewed - Evidence of contrast in the rectum.     Evin Hernandez MD, MS  PGY-1, General Surgery  Pager: [phone]

## 2022-04-30 NOTE — CONSULTS
General Surgery Consult    Andrew Lockwood MRN# [id number]   YOB: 1967 Age: 54 year old      Date of Admission:  4/29/2022        Consult for:    SBO        Assessment:     54 year old male with poorly controlled DM, HIV c/b neuro toxo, history of recurrent SBO, prior diagnostic laparoscopy/ANASTASIIA (2016, 2018), who presents with abdominal pain and obstipation. No further nausea and no gastric distention on CT, okay to hold off on NGT at this time. He is tachycardic, otherwise normal vitals, abdominal exam without findings of peritonitis, CT without findings of ischemia.         Plan:        gastrograffin challenge (okay to drink but if becomes nauseous stop and place NGT). AXR in am at 0600.     Electrolyte goals K 4, Mg 2, P 3    mIVF    Discussed with staff, Dr. Beltran, who is in agreement with plan.     Shannan Street MD  Surgery, PGY3  [phone]         History of Present Illness:    Andrew Lockwood is a 54 year old male with history of poorly controlled DM, HIV c/b neuro toxoplasmosis, recurrent SBO, prior diagnostic laparoscopy/ANASTASIIA (2016, 2018), who presents with abdominal pain and obstipation. Patient reports abdominal pain, worst in LUQ since ~0600 yesterday. Associated nausea and multiple episodes of emesis yesterday. None today, though has not eaten. Unsure when last passed gas, last bowel movement sometime yesterday. Feels like prior SBOs.    Patient with two recent admissions in March for SBO, most recently 3/22-24. Surgery was planning for OR on 3/24 but patient left AMA. Since then states has been feeling okay.    ED work up notable for CT A/P w/ dilated loops of small bowel with transition point in LUQ, likely partial SBO. No bowel wall thickening, free air, mesenteric edema. WBC 10.9. .    Past Medical History:  Past Medical History:   Diagnosis Date     AIDS (H)      Allergic rhinitis due to other allergen     DNS     Anal dysplasia      Chronic abdominal pain      CNS  toxoplasmosis (H)      Diabetes type 2, controlled (H)      GERD (gastroesophageal reflux disease)      History of seizure      History of substance abuse (H)      HIV (human immunodeficiency virus infection) (H)      HLD (hyperlipidemia)      Lung nodules      Periungual wart      PTSD (post-traumatic stress disorder)      Sleep apnea     doesn't use CPAP       Past Surgical History:  Past Surgical History:   Procedure Laterality Date     ANOSCOPY N/A 9/9/2020    Procedure: Exam Under Anesthesia, ANOSCOPY, fulgeration of rectal fissures with Rectal Biopsies;  Surgeon: Thanh Lundberg MD;  Location: [facility] OR     COLONOSCOPY Left 1/22/2016    Procedure: COMBINED COLONOSCOPY, SINGLE OR MULTIPLE BIOPSY/POLYPECTOMY BY BIOPSY;  Surgeon: Clark Saini MD;  Location: [facility] GI     HC EXPLORE UNDESC TESTIS,INGUIN/SCROTAL       LAPAROSCOPIC APPENDECTOMY N/A 1/31/2018    Procedure: LAPAROSCOPIC APPENDECTOMY;  LAPAROSCOPIC APPENDECTOMY;  Surgeon: Dawn Holt MD;  Location: [facility] OR     LAPAROSCOPY DIAGNOSTIC (GENERAL) N/A 7/26/2016    Procedure: LAPAROSCOPY DIAGNOSTIC (GENERAL);  Surgeon: Susannah Arriaga MD;  Location: [facility] OR     LAPAROSCOPY DIAGNOSTIC (GENERAL) N/A 4/16/2018    Procedure: LAPAROSCOPY DIAGNOSTIC (GENERAL);  Diagnostic laparoscopy and lysis of adhesions;  Surgeon: Prince Dowling MD;  Location: [facility] OR     OPTICAL TRACKING SYSTEM CRANIOTOMY, EXCISE TUMOR, COMBINED Left 4/10/2015    Procedure: COMBINED OPTICAL TRACKING SYSTEM CRANIOTOMY, EXCISE TUMOR;  Surgeon: Mirlande Colmenares MD;  Location: [facility] OR     REPAIR GAMEKEEPER'S THUMB Right 12/2/2016    Procedure: REPAIR LIGAMENT ULNAR COLLATERAL THUMB (GAMEKEEPER'S);  Surgeon: Evin Zamorano MD;  Location: [facility] OR     ZZC NONSPECIFIC PROCEDURE      right forearm fracture   Appendectomy  Diagnostic laparoscopy/ANASTASIIA 2016 and 2018    Allergies:     Allergies   Allergen Reactions     Metformin      Abdominal pain     Tylenol [Acetaminophen]  "Itching     Dulaglutide Rash     Insulin Lispro Rash     Patient reported     Penicillin V Other (See Comments) and Rash     Diffuse maculopapular rash + feels \"high\", per pt.        Medications:  No current facility-administered medications on file prior to encounter.  Alcohol Swabs PADS, Use to swab the area of the injection or todd as directed Per insurance coverage  bictegravir-emtricitabine-tenofovir (BIKTARVY) -25 MG per tablet, Take 1 tablet by mouth daily  blood glucose (NO BRAND SPECIFIED) test strip, Use to test blood sugar 4 times daily or as directed. To accompany:whatever is covered  blood glucose calibration (NO BRAND SPECIFIED) solution, Used to calibrate the blood glucose monitor as needed and as directed.  To accompany  blood glucose brands per insurance coverage  blood glucose monitoring (NO BRAND SPECIFIED) meter device kit, Use as directed. Per insurance coverage.  ciprofloxacin (CIPRO) 500 MG tablet, Take 1 tablet (500 mg) by mouth 2 times daily  empagliflozin (JARDIANCE) 25 MG TABS tablet, Take 1 tablet (25 mg) by mouth daily  glucose 40 % (400 mg/mL) gel, 15 g every 15 minutes by mouth as needed for low blood sugar. Oral gel is preferable for conscious and able to swallow patient.  insulin detemir (LEVEMIR PEN) 100 UNIT/ML pen, Inject 20 Units Subcutaneous 2 times daily  insulin pen needle (BD SCOTT U/F) 32G X 4 MM miscellaneous, USE 3 TO 4 NEEDLES DAILY. Follow up visit needed. Call  to schedule.  metFORMIN (GLUCOPHAGE-XR) 500 MG 24 hr tablet, Take 1 tablet (500 mg) by mouth daily (with dinner)  NOVOLOG FLEXPEN 100 UNIT/ML soln, Inject 5 Units Subcutaneous 3 times daily (with meals)  pantoprazole (PROTONIX) 40 MG EC tablet, Take 1 tablet (40 mg) by mouth daily  polyethylene glycol (MIRALAX) 17 GM/Dose powder, Take 17 g by mouth daily  SENNA-docusate sodium (SENNA S) 8.6-50 MG tablet, Take 1 tablet by mouth 2 times daily as needed (constipation)  Sharps Container MISC, Use as " directed to dispose of needles, lancets and other sharps. Per Insurance coverage  simethicone (MYLICON) 80 MG chewable tablet, Take 1 tablet (80 mg) by mouth every 6 hours as needed for cramping  STATIN NOT PRESCRIBED (INTENTIONAL), Please choose reason not prescribed, below  thin (NO BRAND SPECIFIED) lancets, Use with lanceting device. To accompany: Test 4 times daily with whatever is covered        Social History:  Social History     Socioeconomic History     Marital status: Single     Spouse name: Not on file     Number of children: Not on file     Years of education: Not on file     Highest education level: Not on file   Occupational History     Occupation:      Comment: 5 years; temp agency     Occupation: Unemployed   Tobacco Use     Smoking status: Current Every Day Smoker     Packs/day: 0.25     Years: 40.00     Pack years: 10.00     Types: Cigarettes     Smokeless tobacco: Former User   Substance and Sexual Activity     Alcohol use: No     Alcohol/week: 0.0 standard drinks     Comment: Last etoh in 2007     Drug use: Not Currently     Types: Marijuana, Methamphetamines     Sexual activity: Not Currently     Partners: Female, Male     Comment: Last sexual activity 2008   Other Topics Concern     Parent/sibling w/ CABG, MI or angioplasty before 65F 55M? Not Asked   Social History Narrative    Born in [facility].  Came to the [place] in 1993.  Last traveled to visit family in 2008.        1/7/2019 - Homeless. Working with a  at [employer] trying to get housing. Sexually active with two partners recently using condoms 100% of the time. Smokes occasionally, not for the last 4 weeks.        1/7/2020 at [facility] since 1/1/2020. 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  Care established with ID and endocrine     Social Determinants of Health     Financial Resource Strain: Not on file   Food Insecurity: Not on file   Transportation Needs: Not on file   Physical Activity: Not on file   Stress: Not on  file   Social Connections: Not on file   Intimate Partner Violence: Not on file   Housing Stability: Not on file       Family History:  Family History   Problem Relation Age of Onset     Diabetes Brother      Diabetes Father      Alzheimer Disease Father      Unknown/Adopted Mother      Diabetes Paternal Grandfather      Cancer No family hx of         no skin cancer     Skin Cancer No family hx of         no famiy hx of skin cancer     Glaucoma No family hx of      Macular Degeneration No family hx of        ROS:  Per HPI, remainder of ROS negative    Exam:  BP (!) 140/87   Pulse 103   Temp 97.8  F (36.6  C) (Oral)   Resp 19   SpO2 99%   General: NAD  HEENT: sclera anicteric  Neck: trachea midline  Resp: NLB on RA  Cardiac: Tachycardic  Abdomen: Soft, minimal tenderness, mild tenderness LUQ, no rigidity or guarding  Extremities: No LE edema  Skin: Warm and dry  Neuro: No focal deficits    Labs:  Most Recent CBC:   Recent Labs   Lab Test 04/29/22  1705   WBC 10.9   RBC 5.55   HGB 14.9   HCT 46.4   MCV 84   MCH 26.8   MCHC 32.1   RDW 13.2        Most Recent BMP:   Recent Labs   Lab Test 04/29/22  1705 03/23/22  0834 03/23/22  0615     --  136   POTASSIUM 3.6  --  3.5   CHLORIDE 106  --  110*   CO2 16*  --  19*   BUN 18  --  11   CR 0.69  --  0.71   *   < > 129*   MAG  --   --  2.0    < > = values in this interval not displayed.         Imaging:  CT A/P:    IMPRESSION:     Multiple dilated loops of jejunum in the left midabdomen. Gradual  tapering distally leading to a more focal transition point in the left  upper quadrant with decompression of the downstream small bowel.  Findings concerning for partial bowel obstruction. No pneumatosis,  portal venous gas, or free air.  Penile implant in pelvis.

## 2022-04-30 NOTE — ED NOTES
Patient received in signout from Dr. Kurtz. See his note for full documentation.  In short, patient presents with diffuse abdominal pain.  CT scan shows partial small bowel obstruction.  Plan on signout is to follow-up with general surgery recommendations and disposition accordingly.    Case discussed with the surgical team.  No plan for acute surgical intervention.  However, given his discomfort and tachycardia, they recommended Gastrografin challenge and admission to medicine.  They will continue to follow in consult.  They do not recommend NG tube    Will discuss case with the medicine team.     Kofi Mauro DO  04/29/22 [id number]

## 2022-05-01 NOTE — DISCHARGE SUMMARY
[facility]  Discharge Summary - [facility] Medicine Service       Date of Admission:  4/29/2022  Date of Discharge:  4/30/2022, AMA/elopement  Discharging Provider: Dr. Gris Ramírez  Discharge Service: [facility] Medicine Service    Discharge Diagnoses   Partial intestinal obstruction, unspecified cause (H)    Follow-ups Needed After Discharge     Unresulted Labs Ordered in the Past 30 Days of this Admission     Date and Time Order Name Status Description    4/29/2022  5:17 PM Blood Culture Peripheral Blood Preliminary       These results will be followed up by PCP    Discharge Disposition   Discharged to [place] (unsure if he has room saved at shelter or ability to get back there)  Condition at discharge: unable to assess    Hospital Course   Andrew Lockwood was admitted on 4/29/2022 for partial SBO.  He ha a known PMHx of DM 2 on insulin, recurrent SBOs (with recent admissions 3/14-3/17 and 3/22-3/24) s/p laparotomies for removal of adhesions (2016, 2018), appendectomy (2018), GERD, HIV in Biktarvy (w/ h/o CNS toxoplasmosis). The following problems were addressed during his hospitalization:     #Recurrent partial SBO  Onset of diffuse abdominal pain 4/28 afternoon with nausea/vomiting which has since resolved. No fevers/chills recently. CT A/P findings of multiple dilated loops of jejunum with transition point in the left upper quadrant, consistent with partial SBO. Gastrografin challenge/imaging completed reassuringly showing contrast throughout the colon. ADAT to FLD tolerated well. Patient left AMA/eloped without discussion with provider prior to leaving.     #Lactic acidosis, resolved  Lactic acid mildly elevated to 2.1 on admission. Slightly tachycardic, not meeting SIRS criteria. Likely secondary to dehydration from SBO/diarrhea rather than ischemia. Resolved with IVF.   - Pending blood cultures, follow up with PCP     #T2DM, hx of poor control  #Insulin  Use  Difficulties w/ outpatient control due to lack of primary follow up and access to monitoring supplies. PTA insulin regimen included Levemir 20U BID and Novolug 5U TID. He was started on additional Jardiance 25mg and Metformin 500mg daily after his 3/17 hospitalization. Patient notes that he has not been taking his insulin for the past month.   - PTA Jardiance and Metformin   - follow up with PCP for clarification of insulin regimen     #Episodes of loose stools  #Possible hx of SIBO  Multiple references to SIBO in patient's chart dating back several years based on patient's sx of recurrent diarrhea and bloating; has not had carbohydrate breath test for official dx. During last 2 hospitalizations in March 2022, patient was placed on BID ciprofloxacin for possible SIBO. Course was supposed to be 14 days, but patient compliance outside of hospital is unclear. Patient is endorsing large volume loose stools yesterday and today, which could be from SIBO or possible C.Diff given recent abx course, however, c diff testing returned negative.     #Housing insecurity  #Methamphetamin Use  Patient last known to be living in a shelter - [facility]. Patient has noted prior that he has declined operative cares d/t lack of stable housing. He states he also smokes methamphetamines - last usage 2 weeks ago. Has used for 5 years. Declined SW consult and Addiction Medicine / ChemDep at this time      --------------------------------------------------------------------------------------------------------------------------     Chronic/Stable   #HIV  #Hx of Toxoplasmosis Encephalopathy   - PTA Biktarvy      #GERD  - PTA protonix 40 daily    Consultations This Hospital Stay   None    Code Status   Full Code       On call attending physician, Dr. Haq, notified of patient's elopement.     MD Lissy Gonzales's Service  [facility] EMERGENCY DEPARTMENT  [address] [address]  Phone:  [phone]  ______________________________________________________________________    Physical Exam   Vital Signs: Temp: 97.6  F (36.4  C) Temp src: Oral BP: (!) 127/92 Pulse: 87   Resp: 16 SpO2: 99 % O2 Device: None (Room air)    Weight: 155 lbs 0 oz  Unable to complete due to patient eloping/leaving AMA without seeing provider.       Primary Care Physician   Ayala Prieto    Discharge Orders   No discharge procedures on file.    Significant Results and Procedures   Most Recent 3 CBC's:Recent Labs   Lab Test 04/30/22  0730 04/29/22  1705 03/23/22  0615   WBC 7.2 10.9 7.6   HGB 12.5* 14.9 11.4*   MCV 84 84 87    354 378     Most Recent 3 BMP's:Recent Labs   Lab Test 04/30/22  1827 04/30/22  1103 04/30/22  0747 04/30/22  0730 04/30/22  0133 04/29/22  1705 03/23/22  0834 03/23/22  0615   NA  --   --   --  141  --  135  --  136   POTASSIUM  --   --   --  3.5  --  3.6  --  3.5   CHLORIDE  --   --   --  111*  --  106  --  110*   CO2  --   --   --  24  --  16*  --  19*   BUN  --   --   --  15  --  18  --  11   CR  --   --   --  0.65*  --  0.69  --  0.71   ANIONGAP  --   --   --  6  --  13  --  7   LINDA  --   --   --  9.1  --  9.9  --  8.8   * 160* 135* 149*   < > 259*   < > 129*    < > = values in this interval not displayed.     Most Recent 2 LFT's:Recent Labs   Lab Test 04/30/22  0730 04/29/22  1705   AST 15 17   ALT 19 28   ALKPHOS 91 141   BILITOTAL 0.8 0.8     Most Recent 3 INR's:Recent Labs   Lab Test 04/29/22  1705 01/22/22  1640 12/19/20  2153   INR 0.96 1.04 1.00     Most Recent 6 Bacteria Isolates From Any Culture (See EPIC Reports for Culture Details):Recent Labs   Lab Test 09/26/20  0837 09/24/20  2317 09/24/20 2005 09/24/20  1859 09/24/20  1857 02/25/20  1248   CULT No growth  No growth Moderate growth  Normal sushma   No growth No growth No growth Moderate growth  Methicillin resistant Staphylococcus aureus (MRSA)  *   ,   Results for orders placed or performed during the hospital  encounter of 04/29/22   Abd/pelvis CT,  IV  contrast only TRAUMA / AAA    Narrative    EXAMINATION: CT abdomen/pelvis with contrast, 4/29/2022.    INDICATION: Abdominal pain, acute, nonlocalized.    COMPARISON: CT abdomen 3/22/2022.    TECHNIQUE: Routine images from the level of the diaphragm through the  pelvis were obtained with contrast.    Total DLP: 265 mGy*cm.    FINDINGS:    Mild focal fatty deposition along the falciform ligament. The  remainder of the liver parenchyma is homogenous. No suspicious hepatic  lesion. The gallbladder is nondistended. Small gallstone noted near  the gallbladder neck. No intrahepatic or extra hepatic biliary duct  dilatation. The spleen, pancreas and bilateral adrenal glands are  normal.    Bilaterally symmetrically enhancing kidneys. No renal lesion. No  hydronephrosis. No hydroureter. Bladder is unremarkable. Penile pump  reservoir noted adjacent to left aspect of the bladder.    Mild rectal stool burden. Multiple dilated loops of jejunum measuring  up to 4 cm in the central left abdomen. Gradual tapering with  transition point noted at the left upper quadrant (series 3 image 148  and series 5 images 40-45). Stomach is unremarkable.    The abdominal aorta and major abdominal arterial vessels are patent  and nonaneurysmal. No intra-abdominal lymphadenopathy.    No acute osseous lesion. No acute fracture.    Lung bases: No focal airspace opacity. Mild bibasilar atelectasis.      Impression    IMPRESSION:    Multiple dilated loops of jejunum in the left midabdomen. Gradual  tapering distally leading to a more focal transition point in the left  upper quadrant with decompression of the downstream small bowel.  Findings concerning for partial bowel obstruction. No pneumatosis,  portal venous gas, or free air.    I have personally reviewed the examination and initial interpretation  and I agree with the findings.    JOSE VITAL DO         SYSTEM ID:  [id number]   XR Gastrografin  Challenge    Narrative    Exam: XR GASTROGRAFIN CHALLENGE, 4/30/2022 8:24 AM    Indication: Gastrografin UGI Challenge    Comparison: CT 4/29/2022, Gastrografin challenge 3/23/2012 2    Findings:   Frontal supine views of the abdomen eight hours after the  administration of Gastrografin. There is Gastrografin in the  descending colon. No abnormally dilated loops of small bowel. No  pneumatosis. The visualized lungs are clear.      Impression    Impression: Gastrografin present in the descending colon eight hours  post-administration. No abnormally dilated loops of small bowel on  today's exam.    I have personally reviewed the examination and initial interpretation  and I agree with the findings.    JANIE VANN MD         SYSTEM ID:  [id number]     *Note: Due to a large number of results and/or encounters for the requested time period, some results have not been displayed. A complete set of results can be found in Results Review.       Discharge Medications   Current Discharge Medication List      CONTINUE these medications which have NOT CHANGED    Details   Alcohol Swabs PADS Use to swab the area of the injection or todd as directed Per insurance coverage  Qty: 100 each, Refills: 0    Associated Diagnoses: Type 2 diabetes mellitus with hyperglycemia, with long-term current use of insulin (H)      bictegravir-emtricitabine-tenofovir (BIKTARVY) -25 MG per tablet Take 1 tablet by mouth daily  Qty: 90 tablet, Refills: 0    Associated Diagnoses: Human immunodeficiency virus (HIV) disease (H); Human immunodeficiency virus (HIV) disease (H)      blood glucose (NO BRAND SPECIFIED) test strip Use to test blood sugar 4 times daily or as directed. To accompany:whatever is covered  Qty: 360 strip, Refills: 3    Associated Diagnoses: Type 2 diabetes mellitus with other specified complication, with long-term current use of insulin (H)      blood glucose calibration (NO BRAND SPECIFIED) solution Used to calibrate the  blood glucose monitor as needed and as directed.  To accompany  blood glucose brands per insurance coverage  Qty: 1 each, Refills: 0    Associated Diagnoses: Type 2 diabetes mellitus with hyperglycemia, with long-term current use of insulin (H)      blood glucose monitoring (NO BRAND SPECIFIED) meter device kit Use as directed. Per insurance coverage.  Qty: 1 kit, Refills: 0    Associated Diagnoses: Type 2 diabetes mellitus with hyperglycemia, with long-term current use of insulin (H)      ciprofloxacin (CIPRO) 500 MG tablet Take 1 tablet (500 mg) by mouth 2 times daily  Qty: 26 tablet, Refills: 0    Associated Diagnoses: SBO (small bowel obstruction) (H)      empagliflozin (JARDIANCE) 25 MG TABS tablet Take 1 tablet (25 mg) by mouth daily  Qty: 90 tablet, Refills: 0    Associated Diagnoses: Type 2 diabetes mellitus with hyperglycemia, with long-term current use of insulin (H)      glucose 40 % (400 mg/mL) gel 15 g every 15 minutes by mouth as needed for low blood sugar. Oral gel is preferable for conscious and able to swallow patient.  Qty: 112.5 g, Refills: 0    Associated Diagnoses: Type 2 diabetes mellitus with hyperglycemia, with long-term current use of insulin (H)      insulin detemir (LEVEMIR PEN) 100 UNIT/ML pen Inject 20 Units Subcutaneous 2 times daily  Qty: 15 mL, Refills: 0    Associated Diagnoses: Type 2 diabetes mellitus with hyperosmolarity without coma, with long-term current use of insulin (H)      insulin pen needle (BD SCOTT U/F) 32G X 4 MM miscellaneous USE 3 TO 4 NEEDLES DAILY. Follow up visit needed. Call  to schedule.  Qty: 400 each, Refills: 0    Associated Diagnoses: Type 2 diabetes mellitus with hyperglycemia, without long-term current use of insulin (H)      metFORMIN (GLUCOPHAGE-XR) 500 MG 24 hr tablet Take 1 tablet (500 mg) by mouth daily (with dinner)  Qty: 90 tablet, Refills: 0    Associated Diagnoses: Type 2 diabetes mellitus with hyperglycemia, with long-term current use  of insulin (H)      NOVOLOG FLEXPEN 100 UNIT/ML soln Inject 5 Units Subcutaneous 3 times daily (with meals)  Qty: 75 mL, Refills: 0    Associated Diagnoses: Type 2 diabetes mellitus with hyperglycemia, with long-term current use of insulin (H)      pantoprazole (PROTONIX) 40 MG EC tablet Take 1 tablet (40 mg) by mouth daily  Qty: 30 tablet, Refills: 6    Associated Diagnoses: Gastroesophageal reflux disease with esophagitis without hemorrhage      polyethylene glycol (MIRALAX) 17 GM/Dose powder Take 17 g by mouth daily  Qty: 510 g, Refills: 3    Associated Diagnoses: SBO (small bowel obstruction) (H)      SENNA-docusate sodium (SENNA S) 8.6-50 MG tablet Take 1 tablet by mouth 2 times daily as needed (constipation)  Qty: 30 tablet, Refills: 0    Associated Diagnoses: Abdominal pain, epigastric      Sharps Container MISC Use as directed to dispose of needles, lancets and other sharps. Per Insurance coverage  Qty: 1 each, Refills: 0    Associated Diagnoses: Type 2 diabetes mellitus with hyperglycemia, with long-term current use of insulin (H)      simethicone (MYLICON) 80 MG chewable tablet Take 1 tablet (80 mg) by mouth every 6 hours as needed for cramping  Qty: 90 tablet, Refills: 0    Associated Diagnoses: SBO (small bowel obstruction) (H)      STATIN NOT PRESCRIBED (INTENTIONAL) Please choose reason not prescribed, below    Associated Diagnoses: Type 2 diabetes mellitus with complication, without long-term current use of insulin (H)      thin (NO BRAND SPECIFIED) lancets Use with lanceting device. To accompany: Test 4 times daily with whatever is covered  Qty: 360 each, Refills: 3    Associated Diagnoses: Type 2 diabetes mellitus with other specified complication, with long-term current use of insulin (H)           Allergies   Allergies   Allergen Reactions     Metformin      Abdominal pain     Tylenol [Acetaminophen] Itching     Dulaglutide Rash     Insulin Lispro Rash     Patient reported     Penicillin V Other  "(See Comments) and Rash     Diffuse maculopapular rash + feels \"high\", per pt.      "

## 2022-05-01 NOTE — PROGRESS NOTES
Narrator was told by ED tech that the patient was upset and wanting to leave. Narrator went to the patient's room to try to talk with him, but he had already pulled his IV and was walking out of the ED. He told this writer that he was upset about the long wait in the ED - he has been here for almost 29 hours at this point. The patient was stated he was leaving and wanted to be let out of the ED doors. Narrator asked if he was willing to wait to speak to a provider first, he replied no and again asked to be let out. He was let out of the ED and the inpatient team was notified.

## 2022-05-01 NOTE — ED NOTES
"Pt found in ED hallway, s/p self removing PIV. Pt states \"I am leaving, I'm tired of waiting and I'm not staying.\" Pt refused to speak with admitting provider. Educated pt on risks of leaving AMA. PT verbalize understanding, Alert/ ambulatory. Admitting provider notified of AMA.   "

## 2022-05-04 LAB — BACTERIA BLD CULT: NO GROWTH

## 2022-05-20 ENCOUNTER — HOSPITAL ENCOUNTER (EMERGENCY)
Facility: CLINIC | Age: 57
Discharge: HOME | End: 2022-05-20
Attending: EMERGENCY MEDICINE | Admitting: EMERGENCY MEDICINE
Payer: COMMERCIAL

## 2022-05-20 ENCOUNTER — APPOINTMENT (OUTPATIENT)
Dept: CT IMAGING | Facility: CLINIC | Age: 57
End: 2022-05-20
Attending: EMERGENCY MEDICINE
Payer: COMMERCIAL

## 2022-05-20 VITALS
OXYGEN SATURATION: 99 % | SYSTOLIC BLOOD PRESSURE: 137 MMHG | HEART RATE: 110 BPM | RESPIRATION RATE: 16 BRPM | BODY MASS INDEX: 27.31 KG/M2 | WEIGHT: 160 LBS | DIASTOLIC BLOOD PRESSURE: 92 MMHG | HEIGHT: 64 IN

## 2022-05-20 DIAGNOSIS — R10.84 ABDOMINAL PAIN, GENERALIZED: ICD-10-CM

## 2022-05-20 DIAGNOSIS — R11.2 NON-INTRACTABLE VOMITING WITH NAUSEA, UNSPECIFIED VOMITING TYPE: ICD-10-CM

## 2022-05-20 LAB
ALBUMIN SERPL-MCNC: 4.1 G/DL (ref 3.4–5)
ALP SERPL-CCNC: 106 U/L (ref 40–150)
ALT SERPL W P-5'-P-CCNC: 20 U/L (ref 0–70)
ANION GAP SERPL CALCULATED.3IONS-SCNC: 11 MMOL/L (ref 3–14)
AST SERPL W P-5'-P-CCNC: 10 U/L (ref 0–45)
BASOPHILS # BLD AUTO: 0.1 10E3/UL (ref 0–0.2)
BASOPHILS NFR BLD AUTO: 1 %
BILIRUB SERPL-MCNC: 0.6 MG/DL (ref 0.2–1.3)
BUN SERPL-MCNC: 22 MG/DL (ref 7–30)
CALCIUM SERPL-MCNC: 9.4 MG/DL (ref 8.5–10.1)
CHLORIDE BLD-SCNC: 110 MMOL/L (ref 94–109)
CO2 SERPL-SCNC: 19 MMOL/L (ref 20–32)
CREAT SERPL-MCNC: 0.87 MG/DL (ref 0.66–1.25)
EOSINOPHIL # BLD AUTO: 0.2 10E3/UL (ref 0–0.7)
EOSINOPHIL NFR BLD AUTO: 2 %
ERYTHROCYTE [DISTWIDTH] IN BLOOD BY AUTOMATED COUNT: 14.1 % (ref 10–15)
GFR SERPL CREATININE-BSD FRML MDRD: >90 ML/MIN/1.73M2
GLUCOSE BLD-MCNC: 142 MG/DL (ref 70–99)
HCT VFR BLD AUTO: 49.2 % (ref 40–53)
HGB BLD-MCNC: 15.4 G/DL (ref 13.3–17.7)
HOLD SPECIMEN: NORMAL
IMM GRANULOCYTES # BLD: 0.1 10E3/UL
IMM GRANULOCYTES NFR BLD: 1 %
LIPASE SERPL-CCNC: 126 U/L (ref 73–393)
LYMPHOCYTES # BLD AUTO: 2.8 10E3/UL (ref 0.8–5.3)
LYMPHOCYTES NFR BLD AUTO: 30 %
MCH RBC QN AUTO: 26.6 PG (ref 26.5–33)
MCHC RBC AUTO-ENTMCNC: 31.3 G/DL (ref 31.5–36.5)
MCV RBC AUTO: 85 FL (ref 78–100)
MONOCYTES # BLD AUTO: 0.6 10E3/UL (ref 0–1.3)
MONOCYTES NFR BLD AUTO: 6 %
NEUTROPHILS # BLD AUTO: 5.6 10E3/UL (ref 1.6–8.3)
NEUTROPHILS NFR BLD AUTO: 60 %
NRBC # BLD AUTO: 0 10E3/UL
NRBC BLD AUTO-RTO: 0 /100
PLATELET # BLD AUTO: 399 10E3/UL (ref 150–450)
POTASSIUM BLD-SCNC: 4.3 MMOL/L (ref 3.4–5.3)
PROT SERPL-MCNC: 8.7 G/DL (ref 6.8–8.8)
RBC # BLD AUTO: 5.78 10E6/UL (ref 4.4–5.9)
SODIUM SERPL-SCNC: 140 MMOL/L (ref 133–144)
WBC # BLD AUTO: 9.2 10E3/UL (ref 4–11)

## 2022-05-20 PROCEDURE — 99285 EMERGENCY DEPT VISIT HI MDM: CPT | Mod: 25 | Performed by: EMERGENCY MEDICINE

## 2022-05-20 PROCEDURE — 85004 AUTOMATED DIFF WBC COUNT: CPT | Performed by: EMERGENCY MEDICINE

## 2022-05-20 PROCEDURE — 80053 COMPREHEN METABOLIC PANEL: CPT | Performed by: EMERGENCY MEDICINE

## 2022-05-20 PROCEDURE — 96374 THER/PROPH/DIAG INJ IV PUSH: CPT | Mod: 59 | Performed by: EMERGENCY MEDICINE

## 2022-05-20 PROCEDURE — 36415 COLL VENOUS BLD VENIPUNCTURE: CPT | Performed by: EMERGENCY MEDICINE

## 2022-05-20 PROCEDURE — 74177 CT ABD & PELVIS W/CONTRAST: CPT

## 2022-05-20 PROCEDURE — 74177 CT ABD & PELVIS W/CONTRAST: CPT | Mod: 26 | Performed by: RADIOLOGY

## 2022-05-20 PROCEDURE — 250N000011 HC RX IP 250 OP 636: Performed by: EMERGENCY MEDICINE

## 2022-05-20 PROCEDURE — 258N000003 HC RX IP 258 OP 636: Performed by: EMERGENCY MEDICINE

## 2022-05-20 PROCEDURE — 83690 ASSAY OF LIPASE: CPT | Performed by: EMERGENCY MEDICINE

## 2022-05-20 PROCEDURE — 82040 ASSAY OF SERUM ALBUMIN: CPT | Performed by: EMERGENCY MEDICINE

## 2022-05-20 PROCEDURE — 96361 HYDRATE IV INFUSION ADD-ON: CPT | Performed by: EMERGENCY MEDICINE

## 2022-05-20 PROCEDURE — 99285 EMERGENCY DEPT VISIT HI MDM: CPT | Performed by: EMERGENCY MEDICINE

## 2022-05-20 RX ORDER — ONDANSETRON 2 MG/ML
4 INJECTION INTRAMUSCULAR; INTRAVENOUS EVERY 30 MIN PRN
Status: DISCONTINUED | OUTPATIENT
Start: 2022-05-20 | End: 2022-05-20 | Stop reason: HOSPADM

## 2022-05-20 RX ORDER — ONDANSETRON 4 MG/1
4 TABLET, ORALLY DISINTEGRATING ORAL EVERY 8 HOURS PRN
Qty: 10 TABLET | Refills: 0 | Status: SHIPPED | OUTPATIENT
Start: 2022-05-20 | End: 2022-05-23

## 2022-05-20 RX ORDER — IOPAMIDOL 755 MG/ML
99 INJECTION, SOLUTION INTRAVASCULAR ONCE
Status: COMPLETED | OUTPATIENT
Start: 2022-05-20 | End: 2022-05-20

## 2022-05-20 RX ORDER — LOPERAMIDE HYDROCHLORIDE 2 MG/1
2 TABLET ORAL 4 TIMES DAILY PRN
Qty: 12 TABLET | Refills: 0 | Status: SHIPPED | OUTPATIENT
Start: 2022-05-20 | End: 2022-08-31

## 2022-05-20 RX ORDER — HYDROMORPHONE HYDROCHLORIDE 1 MG/ML
0.5 INJECTION, SOLUTION INTRAMUSCULAR; INTRAVENOUS; SUBCUTANEOUS
Status: DISCONTINUED | OUTPATIENT
Start: 2022-05-20 | End: 2022-05-20

## 2022-05-20 RX ORDER — BICTEGRAVIR SODIUM, EMTRICITABINE, AND TENOFOVIR ALAFENAMIDE FUMARATE 50; 200; 25 MG/1; MG/1; MG/1
1 TABLET ORAL DAILY
Qty: 30 TABLET | Refills: 0 | Status: ON HOLD | OUTPATIENT
Start: 2022-05-20 | End: 2022-11-18

## 2022-05-20 RX ADMIN — ONDANSETRON 4 MG: 2 INJECTION INTRAMUSCULAR; INTRAVENOUS at 15:13

## 2022-05-20 RX ADMIN — IOPAMIDOL 99 ML: 755 INJECTION, SOLUTION INTRAVENOUS at 16:21

## 2022-05-20 RX ADMIN — SODIUM CHLORIDE 1000 ML: 9 INJECTION, SOLUTION INTRAVENOUS at 15:11

## 2022-05-20 ASSESSMENT — ENCOUNTER SYMPTOMS
NECK STIFFNESS: 0
HEADACHES: 0
NAUSEA: 1
ADENOPATHY: 0
POLYDIPSIA: 0
SHORTNESS OF BREATH: 0
NECK PAIN: 0
FEVER: 0
CONFUSION: 0
DIARRHEA: 1
BRUISES/BLEEDS EASILY: 0
ARTHRALGIAS: 0
EYE REDNESS: 0
CHILLS: 0
VOMITING: 1
ABDOMINAL PAIN: 1
DIFFICULTY URINATING: 0
COLOR CHANGE: 0

## 2022-05-20 NOTE — ED PROVIDER NOTES
[place] EMERGENCY DEPARTMENT ([facility])  5/20/22  History     Chief Complaint   Patient presents with     Generalized Weakness     Nausea, Vomiting, & Diarrhea     The history is provided by the patient and medical records.     Andrew Lockwood is a 54 year old male with a past medical history of HIV/AIDS, diabetes mellitus type 2, GERD, CNS toxoplasmosis, hyperlipidemia, past COVID-19 infection (01/12/2022), hypokalemia, s/p appendectomy, and small bowel obstruction presents to the emergency department today with 2 days of nonbloody, nonbilious vomiting and nausea associated with mild, sharp, diffuse, nonradiating, constant abdominal pain.  He also has diarrhea, however, he has had it for almost a month, however, without any changes.  No blood in stool or vomitus.  No fevers.  He states that he is taking all of his outpatient medications prescribed medications, however, this week he has run out of his HIV medications and he is requesting a refill.  No other concerns or complaints.  No chest pain or cough.     Past Medical History:   Diagnosis Date     AIDS (H)      Allergic rhinitis due to other allergen     DNS     Anal dysplasia      Chronic abdominal pain      CNS toxoplasmosis (H)      Diabetes type 2, controlled (H)      GERD (gastroesophageal reflux disease)      History of seizure      History of substance abuse (H)      HIV (human immunodeficiency virus infection) (H)      HLD (hyperlipidemia)      Lung nodules      Periungual wart      PTSD (post-traumatic stress disorder)      Sleep apnea     doesn't use CPAP       Past Surgical History:   Procedure Laterality Date     ANOSCOPY N/A 9/9/2020    Procedure: Exam Under Anesthesia, ANOSCOPY, fulgeration of rectal fissures with Rectal Biopsies;  Surgeon: Thanh Lundberg MD;  Location: [facility] OR     COLONOSCOPY Left 1/22/2016    Procedure: COMBINED COLONOSCOPY, SINGLE OR MULTIPLE BIOPSY/POLYPECTOMY BY BIOPSY;  Surgeon: Clark Saini MD;   Location: [facility] GI     HC EXPLORE UNDESC TESTIS,INGUIN/SCROTAL       LAPAROSCOPIC APPENDECTOMY N/A 1/31/2018    Procedure: LAPAROSCOPIC APPENDECTOMY;  LAPAROSCOPIC APPENDECTOMY;  Surgeon: Dawn Holt MD;  Location: [facility] OR     LAPAROSCOPY DIAGNOSTIC (GENERAL) N/A 7/26/2016    Procedure: LAPAROSCOPY DIAGNOSTIC (GENERAL);  Surgeon: Susannah Arriaga MD;  Location: [facility] OR     LAPAROSCOPY DIAGNOSTIC (GENERAL) N/A 4/16/2018    Procedure: LAPAROSCOPY DIAGNOSTIC (GENERAL);  Diagnostic laparoscopy and lysis of adhesions;  Surgeon: Prince Dowling MD;  Location: [facility] OR     OPTICAL TRACKING SYSTEM CRANIOTOMY, EXCISE TUMOR, COMBINED Left 4/10/2015    Procedure: COMBINED OPTICAL TRACKING SYSTEM CRANIOTOMY, EXCISE TUMOR;  Surgeon: Mirlande Colmenares MD;  Location: [facility] OR     REPAIR GAMEKEEPER'S THUMB Right 12/2/2016    Procedure: REPAIR LIGAMENT ULNAR COLLATERAL THUMB (GAMEKEEPER'S);  Surgeon: Evin Zamorano MD;  Location: [facility] OR     ZZC NONSPECIFIC PROCEDURE      right forearm fracture       Family History   Problem Relation Age of Onset     Diabetes Brother      Diabetes Father      Alzheimer Disease Father      Unknown/Adopted Mother      Diabetes Paternal Grandfather      Cancer No family hx of         no skin cancer     Skin Cancer No family hx of         no famiy hx of skin cancer     Glaucoma No family hx of      Macular Degeneration No family hx of        Social History     Tobacco Use     Smoking status: Current Every Day Smoker     Packs/day: 0.25     Years: 40.00     Pack years: 10.00     Types: Cigarettes     Smokeless tobacco: Former User   Substance Use Topics     Alcohol use: No     Alcohol/week: 0.0 standard drinks     Comment: Last etoh in 2007       Current Facility-Administered Medications   Medication     ondansetron (ZOFRAN) injection 4 mg     Current Outpatient Medications   Medication     bictegravir-emtricitabine-tenofovir (BIKTARVY) -25 MG per tablet     loperamide (IMODIUM  "A-D) 2 MG tablet     ondansetron (ZOFRAN ODT) 4 MG ODT tab     Alcohol Swabs PADS     bictegravir-emtricitabine-tenofovir (BIKTARVY) -25 MG per tablet     blood glucose (NO BRAND SPECIFIED) test strip     blood glucose calibration (NO BRAND SPECIFIED) solution     blood glucose monitoring (NO BRAND SPECIFIED) meter device kit     ciprofloxacin (CIPRO) 500 MG tablet     empagliflozin (JARDIANCE) 25 MG TABS tablet     glucose 40 % (400 mg/mL) gel     insulin detemir (LEVEMIR PEN) 100 UNIT/ML pen     insulin pen needle (BD SCOTT U/F) 32G X 4 MM miscellaneous     metFORMIN (GLUCOPHAGE-XR) 500 MG 24 hr tablet     NOVOLOG FLEXPEN 100 UNIT/ML soln     pantoprazole (PROTONIX) 40 MG EC tablet     polyethylene glycol (MIRALAX) 17 GM/Dose powder     SENNA-docusate sodium (SENNA S) 8.6-50 MG tablet     Sharps Container MISC     simethicone (MYLICON) 80 MG chewable tablet     STATIN NOT PRESCRIBED (INTENTIONAL)     thin (NO BRAND SPECIFIED) lancets        Allergies   Allergen Reactions     Metformin      Abdominal pain     Tylenol [Acetaminophen] Itching     Dulaglutide Rash     Insulin Lispro Rash     Patient reported     Penicillin V Other (See Comments) and Rash     Diffuse maculopapular rash + feels \"high\", per pt.          Review of Systems   Constitutional: Negative for chills and fever.   HENT: Negative for congestion.    Eyes: Negative for redness.   Respiratory: Negative for shortness of breath.    Cardiovascular: Negative for chest pain.   Gastrointestinal: Positive for abdominal pain, diarrhea, nausea and vomiting.   Endocrine: Negative for polydipsia and polyuria.   Genitourinary: Negative for difficulty urinating.   Musculoskeletal: Negative for arthralgias, neck pain and neck stiffness.   Skin: Negative for color change.   Allergic/Immunologic: Positive for immunocompromised state.   Neurological: Negative for headaches.   Hematological: Negative for adenopathy. Does not bruise/bleed easily. " "  Psychiatric/Behavioral: Negative for confusion.     A complete review of systems was performed with pertinent positives and negatives noted in the HPI, and all other systems negative.    Physical Exam   BP: (!) 137/92  Pulse: 110  Resp: 16  Height: 162.6 cm (5' 4\")  Weight: 72.6 kg (160 lb)  SpO2: 99 %  Physical Exam  Vitals and nursing note reviewed.   Constitutional:       General: He is not in acute distress.     Appearance: He is not ill-appearing, toxic-appearing or diaphoretic.   HENT:      Head: Normocephalic and atraumatic.   Eyes:      General: No scleral icterus.     Extraocular Movements: Extraocular movements intact.      Conjunctiva/sclera: Conjunctivae normal.   Cardiovascular:      Rate and Rhythm: Tachycardia present.      Heart sounds: Normal heart sounds.   Pulmonary:      Effort: Pulmonary effort is normal. No respiratory distress.      Breath sounds: Normal breath sounds. No wheezing or rhonchi.   Abdominal:      General: There is distension ( mild).      Palpations: Abdomen is soft.      Tenderness: There is abdominal tenderness ( mild, diffuse). There is no right CVA tenderness, left CVA tenderness, guarding or rebound.   Musculoskeletal:         General: No tenderness. Normal range of motion.      Cervical back: Normal range of motion and neck supple.   Skin:     General: Skin is warm.      Findings: No rash.   Neurological:      General: No focal deficit present.      Mental Status: He is alert and oriented to person, place, and time.      Cranial Nerves: No cranial nerve deficit.      Coordination: Coordination normal.   Psychiatric:         Mood and Affect: Mood normal.         Behavior: Behavior normal.         Thought Content: Thought content normal.         Judgment: Judgment normal.         ED Course      Procedures                   Results for orders placed or performed during the hospital encounter of 05/20/22   CT Abdomen Pelvis w Contrast     Status: None    Narrative    " EXAMINATION: CT ABDOMEN PELVIS W CONTRAST  5/20/2022 4:34 PM      CLINICAL HISTORY: Bowel obstruction suspected    COMPARISON: 4/29/2022    PROCEDURE COMMENTS: CT of the abdomen was performed with iopamidol  (ISOVUE-370) solution 99 mL  intravenous contrast. Coronal and  sagittal reformatted images were obtained.    FINDINGS:  Lower thorax:   Normal.    Abdomen and pelvis:  The liver and biliary system, spleen, and pancreas are normal in  appearance. Small nonobstructive calcified gallstone . No intrahepatic  or extrahepatic biliary dilation. The adrenal glands and kidneys are  normal in appearance. Urinary bladder is well-distended with stable  mild prominence of the bladder dome wall. The prostate is enlarged.  Penile pump reservoir noted adjacent to the left aspect of the  bladder.    There are no abnormally dilated or thickened loops of small bowel or  colon. The appendix is surgically absent with a small appendiceal  stump. Small fat-containing umbilical hernia. There is no free air or  free fluid. There are no abnormally sized lymph nodes. The major  intra-abdominal vasculature is patent and normal in caliber.    Osseous structures:   Normal.      Impression    IMPRESSION:    1. No acute intra-abdominal findings. No evidence of bowel  obstruction.  2. Cholelithiasis without evidence of cholecystitis.  3. Prostatomegaly.    I have personally reviewed the examination and initial interpretation  and I agree with the findings.    JOSE VITAL,          SYSTEM ID:  [id number]   Comprehensive metabolic panel     Status: Abnormal   Result Value Ref Range    Sodium 140 133 - 144 mmol/L    Potassium 4.3 3.4 - 5.3 mmol/L    Chloride 110 (H) 94 - 109 mmol/L    Carbon Dioxide (CO2) 19 (L) 20 - 32 mmol/L    Anion Gap 11 3 - 14 mmol/L    Urea Nitrogen 22 7 - 30 mg/dL    Creatinine 0.87 0.66 - 1.25 mg/dL    Calcium 9.4 8.5 - 10.1 mg/dL    Glucose 142 (H) 70 - 99 mg/dL    Alkaline Phosphatase 106 40 - 150 U/L    AST 10 0 -  45 U/L    ALT 20 0 - 70 U/L    Protein Total 8.7 6.8 - 8.8 g/dL    Albumin 4.1 3.4 - 5.0 g/dL    Bilirubin Total 0.6 0.2 - 1.3 mg/dL    GFR Estimate >90 >60 mL/min/1.73m2   Lipase     Status: Normal   Result Value Ref Range    Lipase 126 73 - 393 U/L   CBC with platelets and differential     Status: Abnormal   Result Value Ref Range    WBC Count 9.2 4.0 - 11.0 10e3/uL    RBC Count 5.78 4.40 - 5.90 10e6/uL    Hemoglobin 15.4 13.3 - 17.7 g/dL    Hematocrit 49.2 40.0 - 53.0 %    MCV 85 78 - 100 fL    MCH 26.6 26.5 - 33.0 pg    MCHC 31.3 (L) 31.5 - 36.5 g/dL    RDW 14.1 10.0 - 15.0 %    Platelet Count 399 150 - 450 10e3/uL    % Neutrophils 60 %    % Lymphocytes 30 %    % Monocytes 6 %    % Eosinophils 2 %    % Basophils 1 %    % Immature Granulocytes 1 %    NRBCs per 100 WBC 0 <1 /100    Absolute Neutrophils 5.6 1.6 - 8.3 10e3/uL    Absolute Lymphocytes 2.8 0.8 - 5.3 10e3/uL    Absolute Monocytes 0.6 0.0 - 1.3 10e3/uL    Absolute Eosinophils 0.2 0.0 - 0.7 10e3/uL    Absolute Basophils 0.1 0.0 - 0.2 10e3/uL    Absolute Immature Granulocytes 0.1 <=0.4 10e3/uL    Absolute NRBCs 0.0 10e3/uL   Extra Tube     Status: None    Narrative    The following orders were created for panel order Extra Tube.  Procedure                               Abnormality         Status                     ---------                               -----------         ------                     Extra Red Top Tube[[id number]]                               Final result                 Please view results for these tests on the individual orders.   Extra Red Top Tube     Status: None   Result Value Ref Range    Hold Specimen [facility]    CBC with platelets differential     Status: Abnormal    Narrative    The following orders were created for panel order CBC with platelets differential.  Procedure                               Abnormality         Status                     ---------                               -----------         ------                      CBC with platelets and d...[[id number]]  Abnormal            Final result                 Please view results for these tests on the individual orders.     Medications   ondansetron (ZOFRAN) injection 4 mg (has no administration in time range)   0.9% sodium chloride BOLUS (0 mLs Intravenous Stopped 5/20/22 1701)   iopamidol (ISOVUE-370) solution 99 mL (99 mLs Intravenous Given 5/20/22 1621)   sodium chloride (PF) 0.9% PF flush 75 mL (75 mLs Intravenous Given 5/20/22 1621)        Assessments & Plan (with Medical Decision Making)   54-year-old man with history of HIV and recurrent SBO presenting to the emergency department with nausea/vomiting, diarrhea, and abdominal pain.  Differential diagnosis: SBO, volvulus, dehydration, electrolyte normalities, pyelonephritis, acute pancreatitis.    After thorough history and physical exam, patient appears to be mild distress due to nausea and pain.  We will treat his nausea with IV Zofran and his pain with IV Dilaudid.  I will hydrate him with a bolus of intravenous normal saline and obtain laboratory studies and CT abdomen/pelvis for further diagnostic evaluation.  He agrees with the plan.    I reviewed patient's medical records, more specifically his last CD4 count which was 489.  Patient's laboratory studies returned without any evidence of leukocytosis, WBC is normal at 9200. There is no evidence of anemia, hemoglobin is normal at 15.4.  Electrolytes show no evidence of dehydration, creatinine is normal at 0.87.  LFTs and lipase are normal.  Glucose is slightly elevated 142.  Patient symptoms improved after emergency department management.  I reviewed his CT abdomen/pelvis and I read the radiology report; no evidence of small bowel obstruction which was suspected initially given his prior history is of SBO.  Repeat abdominal exam shows soft, nontender, nondistended abdomen.  Patient stable for discharge with prescription for Zofran.  He also requested refill for his  Biktarvy, which I will provide him with.  He is stable for discharge with outpatient primary care follow-up and return if his symptoms worsen.  He agrees with the plan.        I have reviewed the nursing notes. I have reviewed the findings, diagnosis, plan and need for follow up with the patient.    New Prescriptions    BICTEGRAVIR-EMTRICITABINE-TENOFOVIR (BIKTARVY) -25 MG PER TABLET    Take 1 tablet by mouth daily    LOPERAMIDE (IMODIUM A-D) 2 MG TABLET    Take 1 tablet (2 mg) by mouth 4 times daily as needed for diarrhea    ONDANSETRON (ZOFRAN ODT) 4 MG ODT TAB    Take 1 tablet (4 mg) by mouth every 8 hours as needed for nausea or vomiting       Final diagnoses:   Non-intractable vomiting with nausea, unspecified vomiting type   Abdominal pain, generalized       --  Braulio Wayne MD  [facility] EMERGENCY DEPARTMENT  5/20/2022     Braulio Wayne MD  05/20/22 1229

## 2022-05-20 NOTE — ED TRIAGE NOTES
Coming in with N/V/D for the last day. States it's been going on since he left the hospitia. Also, states he has been dizzy.     Triage Assessment     Row Name 05/20/22 1422       Triage Assessment (Adult)    Airway WDL WDL       Respiratory WDL    Respiratory WDL WDL

## 2022-05-20 NOTE — DISCHARGE INSTRUCTIONS
Please make an appointment to follow up with Your Primary Care Provider in 3 days unless symptoms completely resolve.  If your symptoms worsen or you develop a fever 100.4  F or higher please come back to the emergency department.

## 2022-05-20 NOTE — ED TRIAGE NOTES
Pt BIBA ([facility]) from shelter with c/o of abdominal pain and diarrhea. VSS and

## 2022-05-24 ENCOUNTER — HOSPITAL ENCOUNTER (EMERGENCY)
Facility: CLINIC | Age: 57
Discharge: LEFT BEFORE DISCHARGE | End: 2022-05-24
Payer: COMMERCIAL

## 2022-05-24 ENCOUNTER — LAB (OUTPATIENT)
Dept: LAB | Facility: CLINIC | Age: 57
End: 2022-05-24
Payer: COMMERCIAL

## 2022-05-24 DIAGNOSIS — B20 HUMAN IMMUNODEFICIENCY VIRUS (HIV) DISEASE (H): Primary | ICD-10-CM

## 2022-05-24 DIAGNOSIS — B20 HUMAN IMMUNODEFICIENCY VIRUS (HIV) DISEASE (H): ICD-10-CM

## 2022-05-24 LAB
ALBUMIN SERPL-MCNC: 3.6 G/DL (ref 3.4–5)
ALP SERPL-CCNC: 99 U/L (ref 40–150)
ALT SERPL W P-5'-P-CCNC: 16 U/L (ref 0–70)
ANION GAP SERPL CALCULATED.3IONS-SCNC: 7 MMOL/L (ref 3–14)
AST SERPL W P-5'-P-CCNC: 16 U/L (ref 0–45)
BASOPHILS # BLD AUTO: 0 10E3/UL (ref 0–0.2)
BASOPHILS NFR BLD AUTO: 0 %
BILIRUB SERPL-MCNC: 0.5 MG/DL (ref 0.2–1.3)
BUN SERPL-MCNC: 14 MG/DL (ref 7–30)
CALCIUM SERPL-MCNC: 9.5 MG/DL (ref 8.5–10.1)
CD3 CELLS # BLD: 1579 CELLS/UL (ref 603–2990)
CD3 CELLS NFR BLD: 61 % (ref 49–84)
CD3+CD4+ CELLS # BLD: 526 CELLS/UL (ref 441–2156)
CD3+CD4+ CELLS NFR BLD: 20 % (ref 28–63)
CD3+CD4+ CELLS/CD3+CD8+ CLL BLD: 0.53 % (ref 1.4–2.6)
CD3+CD8+ CELLS # BLD: 983 CELLS/UL (ref 125–1312)
CD3+CD8+ CELLS NFR BLD: 38 % (ref 10–40)
CHLORIDE BLD-SCNC: 106 MMOL/L (ref 94–109)
CO2 SERPL-SCNC: 26 MMOL/L (ref 20–32)
CREAT SERPL-MCNC: 0.65 MG/DL (ref 0.66–1.25)
EOSINOPHIL # BLD AUTO: 0.2 10E3/UL (ref 0–0.7)
EOSINOPHIL NFR BLD AUTO: 3 %
ERYTHROCYTE [DISTWIDTH] IN BLOOD BY AUTOMATED COUNT: 13.7 % (ref 10–15)
GFR SERPL CREATININE-BSD FRML MDRD: >90 ML/MIN/1.73M2
GLUCOSE BLD-MCNC: 198 MG/DL (ref 70–99)
HCT VFR BLD AUTO: 40.3 % (ref 40–53)
HGB BLD-MCNC: 12.7 G/DL (ref 13.3–17.7)
IMM GRANULOCYTES # BLD: 0 10E3/UL
IMM GRANULOCYTES NFR BLD: 0 %
LYMPHOCYTES # BLD AUTO: 2.4 10E3/UL (ref 0.8–5.3)
LYMPHOCYTES NFR BLD AUTO: 34 %
MCH RBC QN AUTO: 26.5 PG (ref 26.5–33)
MCHC RBC AUTO-ENTMCNC: 31.5 G/DL (ref 31.5–36.5)
MCV RBC AUTO: 84 FL (ref 78–100)
MONOCYTES # BLD AUTO: 0.6 10E3/UL (ref 0–1.3)
MONOCYTES NFR BLD AUTO: 8 %
NEUTROPHILS # BLD AUTO: 3.9 10E3/UL (ref 1.6–8.3)
NEUTROPHILS NFR BLD AUTO: 55 %
NRBC # BLD AUTO: 0 10E3/UL
NRBC BLD AUTO-RTO: 0 /100
PLATELET # BLD AUTO: 306 10E3/UL (ref 150–450)
POTASSIUM BLD-SCNC: 3.5 MMOL/L (ref 3.4–5.3)
PROT SERPL-MCNC: 7.2 G/DL (ref 6.8–8.8)
RBC # BLD AUTO: 4.79 10E6/UL (ref 4.4–5.9)
SODIUM SERPL-SCNC: 139 MMOL/L (ref 133–144)
T CELL COMMENT: ABNORMAL
WBC # BLD AUTO: 7.2 10E3/UL (ref 4–11)

## 2022-05-24 PROCEDURE — 85025 COMPLETE CBC W/AUTO DIFF WBC: CPT | Performed by: PATHOLOGY

## 2022-05-24 PROCEDURE — 80053 COMPREHEN METABOLIC PANEL: CPT | Performed by: PATHOLOGY

## 2022-05-24 PROCEDURE — 36415 COLL VENOUS BLD VENIPUNCTURE: CPT | Performed by: PATHOLOGY

## 2022-05-24 PROCEDURE — 86360 T CELL ABSOLUTE COUNT/RATIO: CPT | Mod: 90 | Performed by: PATHOLOGY

## 2022-05-24 PROCEDURE — 99000 SPECIMEN HANDLING OFFICE-LAB: CPT | Performed by: PATHOLOGY

## 2022-05-24 PROCEDURE — 86359 T CELLS TOTAL COUNT: CPT | Mod: 90 | Performed by: PATHOLOGY

## 2022-05-24 PROCEDURE — 87536 HIV-1 QUANT&REVRSE TRNSCRPJ: CPT | Mod: 90 | Performed by: PATHOLOGY

## 2022-05-25 ENCOUNTER — APPOINTMENT (OUTPATIENT)
Dept: CT IMAGING | Facility: CLINIC | Age: 57
End: 2022-05-25
Attending: EMERGENCY MEDICINE
Payer: COMMERCIAL

## 2022-05-25 ENCOUNTER — APPOINTMENT (OUTPATIENT)
Dept: GENERAL RADIOLOGY | Facility: CLINIC | Age: 57
End: 2022-05-25
Attending: EMERGENCY MEDICINE
Payer: COMMERCIAL

## 2022-05-25 ENCOUNTER — HOSPITAL ENCOUNTER (INPATIENT)
Facility: CLINIC | Age: 57
LOS: 4 days | Discharge: HOME | End: 2022-05-29
Attending: EMERGENCY MEDICINE | Admitting: FAMILY MEDICINE
Payer: COMMERCIAL

## 2022-05-25 DIAGNOSIS — F15.10 METHAMPHETAMINE ABUSE (H): ICD-10-CM

## 2022-05-25 DIAGNOSIS — Z11.52 ENCOUNTER FOR SCREENING LABORATORY TESTING FOR SEVERE ACUTE RESPIRATORY SYNDROME CORONAVIRUS 2 (SARS-COV-2): ICD-10-CM

## 2022-05-25 DIAGNOSIS — Z79.4 TYPE 2 DIABETES MELLITUS WITH HYPERGLYCEMIA, WITH LONG-TERM CURRENT USE OF INSULIN (H): ICD-10-CM

## 2022-05-25 DIAGNOSIS — K56.600 PARTIAL SMALL BOWEL OBSTRUCTION (H): ICD-10-CM

## 2022-05-25 DIAGNOSIS — F15.10 METHAMPHETAMINE USE DISORDER, MILD, ABUSE (H): Primary | ICD-10-CM

## 2022-05-25 DIAGNOSIS — E11.65 TYPE 2 DIABETES MELLITUS WITH HYPERGLYCEMIA, WITH LONG-TERM CURRENT USE OF INSULIN (H): ICD-10-CM

## 2022-05-25 LAB
ALBUMIN SERPL-MCNC: 4 G/DL (ref 3.4–5)
ALP SERPL-CCNC: 98 U/L (ref 40–150)
ALT SERPL W P-5'-P-CCNC: 21 U/L (ref 0–70)
ANION GAP SERPL CALCULATED.3IONS-SCNC: 8 MMOL/L (ref 3–14)
AST SERPL W P-5'-P-CCNC: 17 U/L (ref 0–45)
BASOPHILS # BLD AUTO: 0 10E3/UL (ref 0–0.2)
BASOPHILS NFR BLD AUTO: 1 %
BILIRUB SERPL-MCNC: 0.7 MG/DL (ref 0.2–1.3)
BUN SERPL-MCNC: 17 MG/DL (ref 7–30)
CALCIUM SERPL-MCNC: 10.1 MG/DL (ref 8.5–10.1)
CHLORIDE BLD-SCNC: 102 MMOL/L (ref 94–109)
CO2 SERPL-SCNC: 30 MMOL/L (ref 20–32)
CREAT SERPL-MCNC: 0.66 MG/DL (ref 0.66–1.25)
EOSINOPHIL # BLD AUTO: 0.1 10E3/UL (ref 0–0.7)
EOSINOPHIL NFR BLD AUTO: 1 %
ERYTHROCYTE [DISTWIDTH] IN BLOOD BY AUTOMATED COUNT: 13.8 % (ref 10–15)
GFR SERPL CREATININE-BSD FRML MDRD: >90 ML/MIN/1.73M2
GLUCOSE BLD-MCNC: 166 MG/DL (ref 70–99)
GLUCOSE BLDC GLUCOMTR-MCNC: 126 MG/DL (ref 70–99)
GLUCOSE BLDC GLUCOMTR-MCNC: 156 MG/DL (ref 70–99)
GLUCOSE BLDC GLUCOMTR-MCNC: 158 MG/DL (ref 70–99)
HAV IGG SER QL IA: REACTIVE
HCT VFR BLD AUTO: 42.3 % (ref 40–53)
HGB BLD-MCNC: 14 G/DL (ref 13.3–17.7)
IMM GRANULOCYTES # BLD: 0 10E3/UL
IMM GRANULOCYTES NFR BLD: 0 %
LACTATE SERPL-SCNC: 0.7 MMOL/L (ref 0.7–2)
LIPASE SERPL-CCNC: 144 U/L (ref 73–393)
LYMPHOCYTES # BLD AUTO: 2.1 10E3/UL (ref 0.8–5.3)
LYMPHOCYTES NFR BLD AUTO: 24 %
MAGNESIUM SERPL-MCNC: 1.9 MG/DL (ref 1.6–2.3)
MAGNESIUM SERPL-MCNC: 1.9 MG/DL (ref 1.6–2.3)
MCH RBC QN AUTO: 26.8 PG (ref 26.5–33)
MCHC RBC AUTO-ENTMCNC: 33.1 G/DL (ref 31.5–36.5)
MCV RBC AUTO: 81 FL (ref 78–100)
MONOCYTES # BLD AUTO: 0.7 10E3/UL (ref 0–1.3)
MONOCYTES NFR BLD AUTO: 8 %
NEUTROPHILS # BLD AUTO: 5.8 10E3/UL (ref 1.6–8.3)
NEUTROPHILS NFR BLD AUTO: 66 %
NRBC # BLD AUTO: 0 10E3/UL
NRBC BLD AUTO-RTO: 0 /100
PHOSPHATE SERPL-MCNC: 2.5 MG/DL (ref 2.5–4.5)
PHOSPHATE SERPL-MCNC: 3.5 MG/DL (ref 2.5–4.5)
PLATELET # BLD AUTO: 345 10E3/UL (ref 150–450)
POTASSIUM BLD-SCNC: 3.2 MMOL/L (ref 3.4–5.3)
POTASSIUM BLD-SCNC: 3.6 MMOL/L (ref 3.4–5.3)
PROT SERPL-MCNC: 8 G/DL (ref 6.8–8.8)
RBC # BLD AUTO: 5.23 10E6/UL (ref 4.4–5.9)
SARS-COV-2 RNA RESP QL NAA+PROBE: NEGATIVE
SODIUM SERPL-SCNC: 140 MMOL/L (ref 133–144)
WBC # BLD AUTO: 8.8 10E3/UL (ref 4–11)

## 2022-05-25 PROCEDURE — 250N000013 HC RX MED GY IP 250 OP 250 PS 637: Performed by: STUDENT IN AN ORGANIZED HEALTH CARE EDUCATION/TRAINING PROGRAM

## 2022-05-25 PROCEDURE — 36415 COLL VENOUS BLD VENIPUNCTURE: CPT | Performed by: STUDENT IN AN ORGANIZED HEALTH CARE EDUCATION/TRAINING PROGRAM

## 2022-05-25 PROCEDURE — 96361 HYDRATE IV INFUSION ADD-ON: CPT | Performed by: EMERGENCY MEDICINE

## 2022-05-25 PROCEDURE — 83735 ASSAY OF MAGNESIUM: CPT | Performed by: STUDENT IN AN ORGANIZED HEALTH CARE EDUCATION/TRAINING PROGRAM

## 2022-05-25 PROCEDURE — C9803 HOPD COVID-19 SPEC COLLECT: HCPCS | Performed by: EMERGENCY MEDICINE

## 2022-05-25 PROCEDURE — 250N000011 HC RX IP 250 OP 636: Performed by: EMERGENCY MEDICINE

## 2022-05-25 PROCEDURE — 83605 ASSAY OF LACTIC ACID: CPT | Performed by: STUDENT IN AN ORGANIZED HEALTH CARE EDUCATION/TRAINING PROGRAM

## 2022-05-25 PROCEDURE — 96375 TX/PRO/DX INJ NEW DRUG ADDON: CPT | Performed by: EMERGENCY MEDICINE

## 2022-05-25 PROCEDURE — 84100 ASSAY OF PHOSPHORUS: CPT | Performed by: STUDENT IN AN ORGANIZED HEALTH CARE EDUCATION/TRAINING PROGRAM

## 2022-05-25 PROCEDURE — 96366 THER/PROPH/DIAG IV INF ADDON: CPT | Performed by: EMERGENCY MEDICINE

## 2022-05-25 PROCEDURE — 96376 TX/PRO/DX INJ SAME DRUG ADON: CPT | Performed by: EMERGENCY MEDICINE

## 2022-05-25 PROCEDURE — 250N000011 HC RX IP 250 OP 636: Performed by: STUDENT IN AN ORGANIZED HEALTH CARE EDUCATION/TRAINING PROGRAM

## 2022-05-25 PROCEDURE — 80053 COMPREHEN METABOLIC PANEL: CPT | Performed by: EMERGENCY MEDICINE

## 2022-05-25 PROCEDURE — 74177 CT ABD & PELVIS W/CONTRAST: CPT

## 2022-05-25 PROCEDURE — 258N000003 HC RX IP 258 OP 636: Performed by: EMERGENCY MEDICINE

## 2022-05-25 PROCEDURE — 250N000011 HC RX IP 250 OP 636: Performed by: FAMILY MEDICINE

## 2022-05-25 PROCEDURE — 84132 ASSAY OF SERUM POTASSIUM: CPT | Performed by: FAMILY MEDICINE

## 2022-05-25 PROCEDURE — 83690 ASSAY OF LIPASE: CPT | Performed by: EMERGENCY MEDICINE

## 2022-05-25 PROCEDURE — 99223 1ST HOSP IP/OBS HIGH 75: CPT | Mod: GC | Performed by: FAMILY MEDICINE

## 2022-05-25 PROCEDURE — U0003 INFECTIOUS AGENT DETECTION BY NUCLEIC ACID (DNA OR RNA); SEVERE ACUTE RESPIRATORY SYNDROME CORONAVIRUS 2 (SARS-COV-2) (CORONAVIRUS DISEASE [COVID-19]), AMPLIFIED PROBE TECHNIQUE, MAKING USE OF HIGH THROUGHPUT TECHNOLOGIES AS DESCRIBED BY CMS-2020-01-R: HCPCS | Performed by: EMERGENCY MEDICINE

## 2022-05-25 PROCEDURE — C9113 INJ PANTOPRAZOLE SODIUM, VIA: HCPCS | Performed by: EMERGENCY MEDICINE

## 2022-05-25 PROCEDURE — 36415 COLL VENOUS BLD VENIPUNCTURE: CPT | Performed by: EMERGENCY MEDICINE

## 2022-05-25 PROCEDURE — 74019 RADEX ABDOMEN 2 VIEWS: CPT

## 2022-05-25 PROCEDURE — 258N000003 HC RX IP 258 OP 636: Performed by: STUDENT IN AN ORGANIZED HEALTH CARE EDUCATION/TRAINING PROGRAM

## 2022-05-25 PROCEDURE — 86708 HEPATITIS A ANTIBODY: CPT | Performed by: STUDENT IN AN ORGANIZED HEALTH CARE EDUCATION/TRAINING PROGRAM

## 2022-05-25 PROCEDURE — 99221 1ST HOSP IP/OBS SF/LOW 40: CPT | Performed by: SURGERY

## 2022-05-25 PROCEDURE — 96365 THER/PROPH/DIAG IV INF INIT: CPT | Mod: 59 | Performed by: EMERGENCY MEDICINE

## 2022-05-25 PROCEDURE — 99285 EMERGENCY DEPT VISIT HI MDM: CPT | Performed by: EMERGENCY MEDICINE

## 2022-05-25 PROCEDURE — 120N000002 HC R&B MED SURG/OB UMMC

## 2022-05-25 PROCEDURE — 36415 COLL VENOUS BLD VENIPUNCTURE: CPT | Performed by: FAMILY MEDICINE

## 2022-05-25 PROCEDURE — 74177 CT ABD & PELVIS W/CONTRAST: CPT | Mod: 26 | Performed by: RADIOLOGY

## 2022-05-25 PROCEDURE — 99223 1ST HOSP IP/OBS HIGH 75: CPT | Performed by: INTERNAL MEDICINE

## 2022-05-25 PROCEDURE — 85025 COMPLETE CBC W/AUTO DIFF WBC: CPT | Performed by: EMERGENCY MEDICINE

## 2022-05-25 PROCEDURE — 74019 RADEX ABDOMEN 2 VIEWS: CPT | Mod: 26 | Performed by: RADIOLOGY

## 2022-05-25 PROCEDURE — 99285 EMERGENCY DEPT VISIT HI MDM: CPT | Mod: 25 | Performed by: EMERGENCY MEDICINE

## 2022-05-25 RX ORDER — HYDROMORPHONE HYDROCHLORIDE 1 MG/ML
.3-.5 INJECTION, SOLUTION INTRAMUSCULAR; INTRAVENOUS; SUBCUTANEOUS
Status: DISCONTINUED | OUTPATIENT
Start: 2022-05-25 | End: 2022-05-27

## 2022-05-25 RX ORDER — LIDOCAINE 40 MG/G
CREAM TOPICAL
Status: DISCONTINUED | OUTPATIENT
Start: 2022-05-25 | End: 2022-05-29 | Stop reason: HOSPADM

## 2022-05-25 RX ORDER — HYDROMORPHONE HYDROCHLORIDE 2 MG/1
2 TABLET ORAL EVERY 4 HOURS PRN
Status: DISCONTINUED | OUTPATIENT
Start: 2022-05-25 | End: 2022-05-25

## 2022-05-25 RX ORDER — SODIUM CHLORIDE 9 MG/ML
INJECTION, SOLUTION INTRAVENOUS CONTINUOUS
Status: DISCONTINUED | OUTPATIENT
Start: 2022-05-25 | End: 2022-05-25

## 2022-05-25 RX ORDER — NICOTINE POLACRILEX 4 MG
15-30 LOZENGE BUCCAL
Status: DISCONTINUED | OUTPATIENT
Start: 2022-05-25 | End: 2022-05-29 | Stop reason: HOSPADM

## 2022-05-25 RX ORDER — POTASSIUM CHLORIDE 7.45 MG/ML
10 INJECTION INTRAVENOUS ONCE
Status: COMPLETED | OUTPATIENT
Start: 2022-05-25 | End: 2022-05-25

## 2022-05-25 RX ORDER — PANTOPRAZOLE SODIUM 40 MG/1
40 TABLET, DELAYED RELEASE ORAL
Status: DISCONTINUED | OUTPATIENT
Start: 2022-05-25 | End: 2022-05-29 | Stop reason: HOSPADM

## 2022-05-25 RX ORDER — AMOXICILLIN 250 MG
1 CAPSULE ORAL 2 TIMES DAILY
Status: DISCONTINUED | OUTPATIENT
Start: 2022-05-25 | End: 2022-05-27

## 2022-05-25 RX ORDER — ONDANSETRON 4 MG/1
4 TABLET, ORALLY DISINTEGRATING ORAL EVERY 6 HOURS PRN
Status: DISCONTINUED | OUTPATIENT
Start: 2022-05-25 | End: 2022-05-29 | Stop reason: HOSPADM

## 2022-05-25 RX ORDER — ACETAMINOPHEN 325 MG/1
650 TABLET ORAL EVERY 6 HOURS PRN
Status: DISCONTINUED | OUTPATIENT
Start: 2022-05-25 | End: 2022-05-25

## 2022-05-25 RX ORDER — DEXTROSE MONOHYDRATE 25 G/50ML
25-50 INJECTION, SOLUTION INTRAVENOUS
Status: DISCONTINUED | OUTPATIENT
Start: 2022-05-25 | End: 2022-05-29 | Stop reason: HOSPADM

## 2022-05-25 RX ORDER — HYDROMORPHONE HYDROCHLORIDE 2 MG/1
2-4 TABLET ORAL EVERY 4 HOURS PRN
Status: DISCONTINUED | OUTPATIENT
Start: 2022-05-25 | End: 2022-05-25

## 2022-05-25 RX ORDER — SODIUM CHLORIDE, SODIUM LACTATE, POTASSIUM CHLORIDE, CALCIUM CHLORIDE 600; 310; 30; 20 MG/100ML; MG/100ML; MG/100ML; MG/100ML
INJECTION, SOLUTION INTRAVENOUS CONTINUOUS
Status: DISCONTINUED | OUTPATIENT
Start: 2022-05-25 | End: 2022-05-25

## 2022-05-25 RX ORDER — SIMETHICONE 80 MG
80 TABLET,CHEWABLE ORAL EVERY 6 HOURS PRN
Status: DISCONTINUED | OUTPATIENT
Start: 2022-05-25 | End: 2022-05-29 | Stop reason: HOSPADM

## 2022-05-25 RX ORDER — LOPERAMIDE HCL 2 MG
2 CAPSULE ORAL 4 TIMES DAILY PRN
Status: DISCONTINUED | OUTPATIENT
Start: 2022-05-25 | End: 2022-05-29 | Stop reason: HOSPADM

## 2022-05-25 RX ORDER — ONDANSETRON 2 MG/ML
4 INJECTION INTRAMUSCULAR; INTRAVENOUS EVERY 30 MIN PRN
Status: DISCONTINUED | OUTPATIENT
Start: 2022-05-25 | End: 2022-05-25

## 2022-05-25 RX ORDER — IOPAMIDOL 755 MG/ML
99 INJECTION, SOLUTION INTRAVASCULAR ONCE
Status: COMPLETED | OUTPATIENT
Start: 2022-05-25 | End: 2022-05-25

## 2022-05-25 RX ORDER — PROCHLORPERAZINE 25 MG
25 SUPPOSITORY, RECTAL RECTAL EVERY 12 HOURS PRN
Status: DISCONTINUED | OUTPATIENT
Start: 2022-05-25 | End: 2022-05-29 | Stop reason: HOSPADM

## 2022-05-25 RX ORDER — SODIUM CHLORIDE, SODIUM LACTATE, POTASSIUM CHLORIDE, CALCIUM CHLORIDE 600; 310; 30; 20 MG/100ML; MG/100ML; MG/100ML; MG/100ML
INJECTION, SOLUTION INTRAVENOUS CONTINUOUS
Status: DISCONTINUED | OUTPATIENT
Start: 2022-05-25 | End: 2022-05-28

## 2022-05-25 RX ORDER — ONDANSETRON 2 MG/ML
4 INJECTION INTRAMUSCULAR; INTRAVENOUS EVERY 6 HOURS PRN
Status: DISCONTINUED | OUTPATIENT
Start: 2022-05-25 | End: 2022-05-29 | Stop reason: HOSPADM

## 2022-05-25 RX ORDER — PROCHLORPERAZINE MALEATE 5 MG
10 TABLET ORAL EVERY 6 HOURS PRN
Status: DISCONTINUED | OUTPATIENT
Start: 2022-05-25 | End: 2022-05-29 | Stop reason: HOSPADM

## 2022-05-25 RX ORDER — POTASSIUM CHLORIDE 7.45 MG/ML
10 INJECTION INTRAVENOUS
Status: DISPENSED | OUTPATIENT
Start: 2022-05-25 | End: 2022-05-25

## 2022-05-25 RX ORDER — AMOXICILLIN 250 MG
2 CAPSULE ORAL 2 TIMES DAILY
Status: DISCONTINUED | OUTPATIENT
Start: 2022-05-25 | End: 2022-05-27

## 2022-05-25 RX ORDER — HYDROMORPHONE HYDROCHLORIDE 1 MG/ML
0.5 INJECTION, SOLUTION INTRAMUSCULAR; INTRAVENOUS; SUBCUTANEOUS
Status: DISCONTINUED | OUTPATIENT
Start: 2022-05-25 | End: 2022-05-25

## 2022-05-25 RX ORDER — METFORMIN HCL 500 MG
500 TABLET, EXTENDED RELEASE 24 HR ORAL
Status: DISCONTINUED | OUTPATIENT
Start: 2022-05-25 | End: 2022-05-29 | Stop reason: HOSPADM

## 2022-05-25 RX ADMIN — HYDROMORPHONE HYDROCHLORIDE 0.5 MG: 1 INJECTION, SOLUTION INTRAMUSCULAR; INTRAVENOUS; SUBCUTANEOUS at 01:56

## 2022-05-25 RX ADMIN — ONDANSETRON 4 MG: 2 INJECTION INTRAMUSCULAR; INTRAVENOUS at 01:56

## 2022-05-25 RX ADMIN — POTASSIUM CHLORIDE 10 MEQ: 7.46 INJECTION, SOLUTION INTRAVENOUS at 12:15

## 2022-05-25 RX ADMIN — SODIUM CHLORIDE: 9 INJECTION, SOLUTION INTRAVENOUS at 04:35

## 2022-05-25 RX ADMIN — IOPAMIDOL 99 ML: 755 INJECTION, SOLUTION INTRAVENOUS at 03:45

## 2022-05-25 RX ADMIN — POTASSIUM CHLORIDE 10 MEQ: 7.46 INJECTION, SOLUTION INTRAVENOUS at 06:46

## 2022-05-25 RX ADMIN — PANTOPRAZOLE SODIUM 40 MG: 40 INJECTION, POWDER, FOR SOLUTION INTRAVENOUS at 04:09

## 2022-05-25 RX ADMIN — DOCUSATE SODIUM 50 MG AND SENNOSIDES 8.6 MG 1 TABLET: 8.6; 5 TABLET, FILM COATED ORAL at 07:45

## 2022-05-25 RX ADMIN — SODIUM CHLORIDE 1000 ML: 9 INJECTION, SOLUTION INTRAVENOUS at 01:56

## 2022-05-25 RX ADMIN — BICTEGRAVIR SODIUM, EMTRICITABINE, AND TENOFOVIR ALAFENAMIDE FUMARATE 1 TABLET: 50; 200; 25 TABLET ORAL at 07:45

## 2022-05-25 RX ADMIN — HYDROMORPHONE HYDROCHLORIDE 0.5 MG: 1 INJECTION, SOLUTION INTRAMUSCULAR; INTRAVENOUS; SUBCUTANEOUS at 11:05

## 2022-05-25 RX ADMIN — SODIUM CHLORIDE, POTASSIUM CHLORIDE, SODIUM LACTATE AND CALCIUM CHLORIDE: 600; 310; 30; 20 INJECTION, SOLUTION INTRAVENOUS at 19:48

## 2022-05-25 RX ADMIN — SODIUM CHLORIDE, POTASSIUM CHLORIDE, SODIUM LACTATE AND CALCIUM CHLORIDE: 600; 310; 30; 20 INJECTION, SOLUTION INTRAVENOUS at 10:57

## 2022-05-25 RX ADMIN — PANTOPRAZOLE SODIUM 40 MG: 40 TABLET, DELAYED RELEASE ORAL at 07:45

## 2022-05-25 RX ADMIN — HYDROMORPHONE HYDROCHLORIDE 0.5 MG: 1 INJECTION, SOLUTION INTRAMUSCULAR; INTRAVENOUS; SUBCUTANEOUS at 04:09

## 2022-05-25 RX ADMIN — HYDROMORPHONE HYDROCHLORIDE 2 MG: 2 TABLET ORAL at 08:43

## 2022-05-25 ASSESSMENT — ACTIVITIES OF DAILY LIVING (ADL)
ADLS_ACUITY_SCORE: 37

## 2022-05-25 ASSESSMENT — ENCOUNTER SYMPTOMS
VOMITING: 1
BLOOD IN STOOL: 0
FEVER: 0
DIARRHEA: 0
ABDOMINAL PAIN: 1
NAUSEA: 1

## 2022-05-25 NOTE — CONSULTS
[facility]   Consult Note - Addiction Service     Date of Admission:  5/25/2022    Consult Requested by: [facility] Medicine Service   Reason for Consult: Methamphetamine use, patient requests detox    Assessment & Plan   Andrew Lockwood is a 53yo w/ a PMHx of HIV/AIDs (on Biktarvy), DM2, Hx of CNS Toxoplasmosis, Hx of COVID infection, Hx of LOS w/ recurrent episodes of PSBO who is admitted to the [facility] Service for PSBO. Addiction Consult was requested for methamphetamine use per patient request.    # Methamphetamine Use Disorder  Patient identifies his main reasons for detox are his current goals in life - wanting to move into independent housing and wanting to attend RN school. He identifies two periods prior of sobriety. He identifies his use tied to weight management and liking the euphoria of use.  >>>>  - Recommend starting Naltrexone 50mg once off of opioids: ideally would wait until at least 48 hours off of opioid pain medications.    - Recommend starting Wellbutrin XR 150mg    -> This (w/ naltrexone) can give a weight-management benefit, which is one of Andrew's goals    - Obtain HepA IgG for assessment of immunity/infection (ordered for you)    - Recommend referral to outpatient addiction medicine for follow up management (placed for you)   -> Injectable naltrexone may be some to help accessibility for patient, something addiction med can help set up    # Immunization review: Immune status for HepB. HepC non-reactive in 8/2021, currently on HIV therapy     # Peer Support:   -Our peer  will meet the patient if agreeable and still hospitalized on Thursday, to provide additional outpatient resources  -To contact Malou Peer  from  [facility] ([facility]): call or text: [phone]    # Addiction Social Worker:   Our addiction social worker Silas Brian can be contacted on her pager [phone] or texted/called at [phone]    #  Linkage to Care:   Currently following w/ his primary Dr. Prieto.     The patient's care was discussed with the Attending Physician, Dr. Angelo Shaw.    I spent 120 minutes on the unit/floor managing the care of Andrew Lockwood. Over 50% of my time was spent on the following:   - Counseling the patient and/or family regarding: diagnostic results, prognosis, risks and benefits of treatment options and recommended follow-up  - Coordination of care with the: consultant(s) and care coordinator/    Gunnar Lopez DO  [facility]   Contact information available via [facility] Paging/Directory  Please see sign in/sign out for up to date coverage information    ChAT team (Addiction Consult Team): Coverage Monday-Friday 8-4pm    Provider (Pager)  (Pager)   Charlie Shaw [phone] Silas Brian [phone]   Tunahomi Shaw [phone] Silas Brian [phone]   Wed Dr. Angelo Shaw [phone] None   Thurs Dr. Babak Monreal [phone] Silas Brian [phone]   Fri Dr. Aguila Mullen [phone] Silas Roc [phone]   [facility] Psych Team- Refer to [facility].  For urgent needs, please place a  consult for psychiatry. None     ______________________________________________________________________    Chief Complaint   Detox from methamphetamine use    History is obtained from the patient    History of Present Illness   Andrew Lockwood is a 53yo w/ a PMHx of HIV/AIDs (on Biktarvy), DM2, Hx of CNS Toxoplasmosis, Hx of COVID infection, Hx of LOS w/ recurrent episodes of PSBO who is admitted to the [facility] Medicine Service for PSBO. Addiction Consult was requested for methamphetamine use.     Patient states they began usage in 2017 with their boyfriend at the time for recreational purposes - and had continual usage between 2017 - 2018. He states he had a period of self sobriety in 2018 for around 8 months and again in 2019 (the latter being part of a detox program). He states his return to use was due to life stressors, wanting to  "use methamphetamine to keep his weight down, as well as generally enjoying the euphoria of use.     He states in 2019/2020 he went to detox treatment at [facility], which included sober living. However, he states he \"did not fit in\" with the others at the sober house as \"they all have mental problems and I didn't\".     He states his current usage is almost everyday - but \"not to the point I go crazy, I just get high - I am high right now\". He uses via smoking crystal, and denies any history of injection use. He obtains his substance from his friends - stating he is homeless and has no money to pay for it himself. He states that he will regularly stop after 3 or 4 days so he can sleep, but then will re-use again.     Of note, patient has a history of HIV and is currently on Biktarvy. He states his primary care physician is Dr. Prieto thru the [facility]. He denies prior psychiatric medication use and psychiatric hospitalization.   ______    Opioid use history: Denies  Alcohol use history: Endorses use in [place] prior to 1993, but states since then none    Withdrawal history: Endorse methamphetamine withdrawal (namely prolonged sleeping)    If injecting: what type of needles? Not applicable    Prior MAT history: None    Other substances used, including alcohol: None    Identified race/ethnicity:    Employed: No  Housing status: Prior immediate homelessness, now in [facility] homes  Needle use (how often shared or reused): Not applicable  Injection technique: Not applicable  Prior history of IV drug related infections: Not applicable  HIV status: On Biktarvy, 5/24 CD4# 526  Hep C status: Non-reactive on 8/2021  Hep A status: Unknown  Hep B status: 3x Immunizations (2016 and 2017)  Sexually active: Yes, multiple same-sex partners, uses protection    Review of Systems   CONSTITUTIONAL: NEGATIVE for fever, chills, change in weight  ENT/MOUTH: NEGATIVE for ear, mouth and throat problems  RESP: NEGATIVE for significant cough or " SOB  CV: NEGATIVE for chest pain, palpitations or peripheral edema  GI: POSITIVE for abdominal pain generalized and gas or bloating  NEURO: NEGATIVE for weakness, dizziness or paresthesias  PSYCHIATRIC: NEGATIVE for changes in mood or affect    Past Medical History:   Diagnosis Date     AIDS (H)      Allergic rhinitis due to other allergen     DNS     Anal dysplasia      Chronic abdominal pain      CNS toxoplasmosis (H)      Diabetes type 2, controlled (H)      GERD (gastroesophageal reflux disease)      History of seizure      History of substance abuse (H)      HIV (human immunodeficiency virus infection) (H)      HLD (hyperlipidemia)      Lung nodules      Periungual wart      PTSD (post-traumatic stress disorder)      Sleep apnea     doesn't use CPAP       Past Surgical History:   Procedure Laterality Date     ANOSCOPY N/A 9/9/2020    Procedure: Exam Under Anesthesia, ANOSCOPY, fulgeration of rectal fissures with Rectal Biopsies;  Surgeon: Thanh Lundberg MD;  Location: [facility] OR     COLONOSCOPY Left 1/22/2016    Procedure: COMBINED COLONOSCOPY, SINGLE OR MULTIPLE BIOPSY/POLYPECTOMY BY BIOPSY;  Surgeon: Clark Saini MD;  Location: [facility] GI     HC EXPLORE UNDESC TESTIS,INGUIN/SCROTAL       LAPAROSCOPIC APPENDECTOMY N/A 1/31/2018    Procedure: LAPAROSCOPIC APPENDECTOMY;  LAPAROSCOPIC APPENDECTOMY;  Surgeon: Dawn Holt MD;  Location: [facility] OR     LAPAROSCOPY DIAGNOSTIC (GENERAL) N/A 7/26/2016    Procedure: LAPAROSCOPY DIAGNOSTIC (GENERAL);  Surgeon: Susannah Arriaga MD;  Location: [facility] OR     LAPAROSCOPY DIAGNOSTIC (GENERAL) N/A 4/16/2018    Procedure: LAPAROSCOPY DIAGNOSTIC (GENERAL);  Diagnostic laparoscopy and lysis of adhesions;  Surgeon: Prince Dowling MD;  Location: [facility] OR     OPTICAL TRACKING SYSTEM CRANIOTOMY, EXCISE TUMOR, COMBINED Left 4/10/2015    Procedure: COMBINED OPTICAL TRACKING SYSTEM CRANIOTOMY, EXCISE TUMOR;  Surgeon: Mirlande Colmenares MD;  Location: [facility] OR     REPAIR  GAMEKEEPER'S THUMB Right 12/2/2016    Procedure: REPAIR LIGAMENT ULNAR COLLATERAL THUMB (GAMEKEEPER'S);  Surgeon: Evin Zamorano MD;  Location:  OR     [facility] NONSPECIFIC PROCEDURE      right forearm fracture       Social History   Social History     Socioeconomic History     Marital status: Single     Spouse name: Not on file     Number of children: Not on file     Years of education: Not on file     Highest education level: Not on file   Occupational History     Occupation:      Comment: 5 years; temp agency     Occupation: Unemployed   Tobacco Use     Smoking status: Current Every Day Smoker     Packs/day: 0.25     Years: 40.00     Pack years: 10.00     Types: Cigarettes     Smokeless tobacco: Former User   Substance and Sexual Activity     Alcohol use: No     Alcohol/week: 0.0 standard drinks     Comment: Last etoh in 2007     Drug use: Not Currently     Types: Marijuana, Methamphetamines     Sexual activity: Not Currently     Partners: Female, Male     Comment: Last sexual activity 2008   Other Topics Concern     Parent/sibling w/ CABG, MI or angioplasty before 65F 55M? Not Asked   Social History Narrative    Born in [facility].  [place]ame to the [place] in 1993.  Last traveled to visit family in 2008.        1/7/2019 - Homeless. Working with a  at [employer] trying to get housing. Sexually active with two partners recently using condoms 100% of the time. Smokes occasionally, not for the last 4 weeks.        1/7/2020 at [facility] since 1/1/2020. 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  Care established with ID and endocrine     Social Determinants of Health     Financial Resource Strain: Not on file   Food Insecurity: Not on file   Transportation Needs: Not on file   Physical Activity: Not on file   Stress: Not on file   Social Connections: Not on file   Intimate Partner Violence: Not on file   Housing Stability: Not on file       Family History   I have reviewed this patient's family history  "and updated it with pertinent information if needed.  Family History   Problem Relation Age of Onset     Diabetes Brother      Diabetes Father      Alzheimer Disease Father      Unknown/Adopted Mother      Diabetes Paternal Grandfather      Cancer No family hx of         no skin cancer     Skin Cancer No family hx of         no famiy hx of skin cancer     Glaucoma No family hx of      Macular Degeneration No family hx of          Medications   I have reviewed this patient's current medications    Allergies   No Known Allergies    Physical Exam   Temp: 98.2  F (36.8  C) Temp src: Oral BP: 129/87 Pulse: 84   Resp: 18 SpO2: 98 % O2 Device: None (Room air)      Gen: no acute distress, well nourished, well developed  HEENT: NC/AT, MMM, EOMI,   CV: Extremities WWP, pulses assumed   Resp: breathing comfortably on RA  Abd: +BS, non-tender, non-distended, no guarding or rebound tenderness  Ext: No significant deformities or trauma, moving all ext freely  Skin: No erythema, no lesions or rashes.   Neuro: No focal neurologic deficit  Psych:  Appearance: Appropriately dressed, laying in hospital [facility], fatigued appearing  Behavior: Cooperative and calm, answering all questions, maintains eye contact w/ interviewer   Speech: Normal in manish, tone, and rhythm   Mood: \"I'm alright\"  Emotive: Full  Psychomotor: Normal  Thought Process: Appears logical and goal directed (ie ties want for detox use to desire to attend RN school)  Thought Content: Denies SI/HI. Not responding to internal stimuli during exam.  Insight: Fair to good - recognizes use, but states he doesn't have a \"mental health problem\"  Judgement: Poor to Fair - is wanting to engage w/ treatment, states he obtains substances from friends - but these same friends are also stated as his supports   Cognition: AAOx3. Both concrete and abstract thinking are demonstrated       Due to regulation of Title 42 of the Code of Federal Regulations (CFR) Part 2: Confidentiality " laws apply to this note and the information wherein.  Thus, this note cannot be copy and pasted into any other health care staff's note nor can it be included in general medical records sent to ANY outside agency without the patient's written consent.    Addiction Medicine Staff Addendum  Date of Service: 5/25/2022  I have seen and examined Andrew Lockwood with the resident team, reviewed the data and discussed the plan of care with the patient and the care team on P&FC Rounds.  I agree with the above documentation     I discussed pt's care with bedside RN, case management/social work today.  I personally reviewed labs, medications and past 24 hr notes.  Assessment/Plan/Diagnoses: plan/dx as above, which contains my edits and reflects our joint medical decision-making.     Angelo Shaw MD  Addiction Medicine   of Internal Medicine and Pediatrics  [facility]  Pager: [phone]

## 2022-05-25 NOTE — PROGRESS NOTES
[facility]    Progress Note - [facility] Service       Date of Admission:  5/25/2022    Assessment & Plan        Andrew Lockwood is a 54 year old male with a past medical history of HIV/AIDS on Biktarvy, poorly controlled diabetes mellitus type 2, GERD, CNS toxoplasmosis, hyperlipidemia, past COVID-19 infection (01/12/2022), s/p appendectomy, ex lap w/ lysis of adhesions (2016, 2018), and recurrent SBOs admitted 5/25/22 with recurrent abdominal pain and CT findings consistent with partial SBO.      # Recurrent SBO  # Abdominal pain  # Nausea/vomiting  53 y/o w/ hx recurrent SBO with many prior hospitalizations, presents 5/25 early AM with acute onset abdominal pain that started on 5/24. In ED, CT showed pSBO and exam consistent with SBO given diffuse abdominal tenderness and distension. Pt declined NG in ED, which he historically does. Anticipate spontaneous resolution with conservative management; pain improves with IV dilaudid. Will continue to monitor closely with serial abdominal exams.     - Declined NG  - Pain: acetaminophen PRN, IV dilaudid 0.3-0.5 mg q2h PRN  - General surgery consulted, appreciate recs  - serial abdominal exams - q6hrs  - mIVF LR @ 125mL  - NPO except meds  - no gastrografin challenge unless pt accepts NG  - Daily BMP, Mg, phos  - Nausea: zofran + compazine     # Poorly controlled type II DM  Last A1c 11.2 in 02/2022. Home meds include jardiance 25 mg, metformin 500mg daily - reports adherence. Not taking insulin though has previously been prescribed. Pt concerned metformin causing diarrhea.   - MISS q4h while NPO  - Will monitor blood sugars and consider restarting prior Levemir at reduced dose of 10 u BID if high  - hypoglycemia protocol     # Hypokalemia  Potassium 3.2 on admission. Replaced in ED.  - Standard replacement protocol.      # Episodes of loose stools  # Possible hx of SIBO  Multiple references to SIBO in patient's  chart dating back several years based on patient's sx of recurrent diarrhea and bloating; has not had carbohydrate breath test for official dx. During last hospitalizations in March 2022, patient was placed on BID ciprofloxacin for possible SIBO. Course was supposed to be 14 days, but patient compliance outside of hospital is unclear. Patient is endorsing large volume loose stools yesterday and today, which could be from SIBO or possible C.Diff given recent abx course. Consider C. Diff testing if loose stools persist.      # Substance use disorder  # History of housing instability  Several years' hx meth use. Smokes; does not inject. Last use 5/24. Interested in detox. Has had interval sobriety over the last few years. No other substance use concerns, including EtOH. Living at "[employer] currently working to get independent housing. Was working to get his ID In the days prior to admission. Declines talking to SW with IP; says he has more resources through "[employer].   - Addiction medicine consulted, appreciate recs:   - naltrexone 50mg when off opioids   - wellbutrin xr 150mg daily   - referral to outpatient addiction medicine follow up placed to help set up injectable naltrexone   - peer support  to meet on Thursday   - HepA IgG to rule out infection       Chronic/Stable:     #HIV  Stable on Biktarvy, which patient has brought with him. Last CD4 count 5/24 was 526.  - PTA Biktarvy     #GERD  - PTA protonix 40 daily     Diet: NPO for Medical/Clinical Reasons Except for: Meds    DVT Prophylaxis: Pneumatic Compression Devices  Potts Catheter: Not present  Fluids: LR @ 125mL  Central Lines: None  Cardiac Monitoring: None  Code Status: Full Code      Disposition Plan   Expected Discharge: 05/26/2022     Anticipated discharge location:  Awaiting care coordination huddle  Delays: optimal pain control       The patient's care was discussed with the Attending Physician, Dr. Key.    Michel Brown  [facility]  Student    Resident/Fellow Attestation   I, Romi Palma MD, was present with the medical/[employer] student who participated in the service and in the documentation of the note.  I have verified the history and personally performed the physical exam and medical decision making.  I agree with the assessment and plan of care as documented in the note.      Romi Palma MD  PGY1  Date of Service (when I saw the patient): 05/25/22    [facility] Family Medicine Service  [facility]  Securely message with the Vocera Web Console (learn more here)  Text page via [facility] Paging/Directory   Please see signed in provider for up to date coverage information      ______________________________________________________________________    Interval History   Abdominal pain noted this morning, but better control with IV pain medications in the afternoon. Patient also noted that he was passing gas in the afternoon.     Data reviewed today: I reviewed all medications, new labs and imaging results over the last 24 hours.    Physical Exam   Vital Signs: Temp: 98.2  F (36.8  C) Temp src: Oral BP: 129/87 Pulse: 84   Resp: 18 SpO2: 98 % O2 Device: None (Room air)    Weight: 0 lbs 0 oz     Physical Exam  Vitals reviewed.   Constitutional:       Appearance: Normal appearance.   HENT:      Head: Normocephalic.   Eyes:      Conjunctiva/sclera: Conjunctivae normal.   Cardiovascular:      Rate and Rhythm: Normal rate and regular rhythm.      Heart sounds: Normal heart sounds.   Pulmonary:      Effort: Pulmonary effort is normal.      Breath sounds: Normal breath sounds.   Abdominal:      General: Bowel sounds are normal. There is distension.      Tenderness: There is abdominal tenderness (diffuse). There is no guarding or rebound.   Musculoskeletal:         General: No swelling.   Skin:     General: Skin is warm and dry.   Neurological:      Mental Status: He is alert.   Psychiatric:         Mood and  Affect: Mood normal.         Behavior: Behavior normal.         Thought Content: Thought content normal.         Judgment: Judgment normal.           Data

## 2022-05-25 NOTE — ED PROVIDER NOTES
ED Provider Note  [facility]      History     Chief Complaint   Patient presents with     Abdominal Pain     The history is provided by the patient and medical records.     Andrew Lockwood is a 54 year old male with a past medical history of HIV/AIDS, diabetes mellitus type 2, GERD, CNS toxoplasmosis, hyperlipidemia, past COVID-19 infection (01/12/2022), hypokalemia, s/p appendectomy, and small bowel obstruction presenting to the ED via EMS with abdominal pain. Patient reports diffuse abdominal pain and vomiting for the past 3 hours.  Symptoms are worsening.  He has not taken anything for pain. He had a normal BM 3 hours ago. He denies fever, diarrhea, or blood in the stool.  He symptoms are very similar to his prior small bowel obstruction pain and nausea and vomiting.  Denies any chest pain or shortness of breath.    Past Medical History  Past Medical History:   Diagnosis Date     AIDS (H)      Allergic rhinitis due to other allergen     DNS     Anal dysplasia      Chronic abdominal pain      CNS toxoplasmosis (H)      Diabetes type 2, controlled (H)      GERD (gastroesophageal reflux disease)      History of seizure      History of substance abuse (H)      HIV (human immunodeficiency virus infection) (H)      HLD (hyperlipidemia)      Lung nodules      Periungual wart      PTSD (post-traumatic stress disorder)      Sleep apnea     doesn't use CPAP     Past Surgical History:   Procedure Laterality Date     ANOSCOPY N/A 9/9/2020    Procedure: Exam Under Anesthesia, ANOSCOPY, fulgeration of rectal fissures with Rectal Biopsies;  Surgeon: Thanh Lundberg MD;  Location: [facility] OR     COLONOSCOPY Left 1/22/2016    Procedure: COMBINED COLONOSCOPY, SINGLE OR MULTIPLE BIOPSY/POLYPECTOMY BY BIOPSY;  Surgeon: Clark Saini MD;  Location: [facility] GI     HC EXPLORE UNDESC TESTIS,INGUIN/SCROTAL       LAPAROSCOPIC APPENDECTOMY N/A 1/31/2018    Procedure: LAPAROSCOPIC APPENDECTOMY;  LAPAROSCOPIC  "APPENDECTOMY;  Surgeon: Dawn Holt MD;  Location: [facility] OR     LAPAROSCOPY DIAGNOSTIC (GENERAL) N/A 7/26/2016    Procedure: LAPAROSCOPY DIAGNOSTIC (GENERAL);  Surgeon: Susannah Arriaga MD;  Location: [facility] OR     LAPAROSCOPY DIAGNOSTIC (GENERAL) N/A 4/16/2018    Procedure: LAPAROSCOPY DIAGNOSTIC (GENERAL);  Diagnostic laparoscopy and lysis of adhesions;  Surgeon: Prince Dowling MD;  Location: [facility] OR     OPTICAL TRACKING SYSTEM CRANIOTOMY, EXCISE TUMOR, COMBINED Left 4/10/2015    Procedure: COMBINED OPTICAL TRACKING SYSTEM CRANIOTOMY, EXCISE TUMOR;  Surgeon: Mirlande Colmenares MD;  Location: [facility] OR     REPAIR GAMEKEEPER'S THUMB Right 12/2/2016    Procedure: REPAIR LIGAMENT ULNAR COLLATERAL THUMB (GAMEKEEPER'S);  Surgeon: Evin Zamorano MD;  Location: [facility] OR     ZZC NONSPECIFIC PROCEDURE      right forearm fracture     Alcohol Swabs PADS  bictegravir-emtricitabine-tenofovir (BIKTARVY) -25 MG per tablet  blood glucose (NO BRAND SPECIFIED) test strip  blood glucose calibration (NO BRAND SPECIFIED) solution  blood glucose monitoring (NO BRAND SPECIFIED) meter device kit  ciprofloxacin (CIPRO) 500 MG tablet  empagliflozin (JARDIANCE) 25 MG TABS tablet  glucose 40 % (400 mg/mL) gel  insulin detemir (LEVEMIR PEN) 100 UNIT/ML pen  insulin pen needle (BD SCOTT U/F) 32G X 4 MM miscellaneous  loperamide (IMODIUM A-D) 2 MG tablet  metFORMIN (GLUCOPHAGE-XR) 500 MG 24 hr tablet  NOVOLOG FLEXPEN 100 UNIT/ML soln  pantoprazole (PROTONIX) 40 MG EC tablet  polyethylene glycol (MIRALAX) 17 GM/Dose powder  Sharps Container MISC  simethicone (MYLICON) 80 MG chewable tablet  STATIN NOT PRESCRIBED (INTENTIONAL)  thin (NO BRAND SPECIFIED) lancets      Allergies   Allergen Reactions     Metformin      Abdominal pain     Tylenol [Acetaminophen] Itching     Dulaglutide Rash     Insulin Lispro Rash     Patient reported     Penicillin V Other (See Comments) and Rash     Diffuse maculopapular rash + feels \"high\", " per pt.      Family History  Family History   Problem Relation Age of Onset     Diabetes Brother      Diabetes Father      Alzheimer Disease Father      Unknown/Adopted Mother      Diabetes Paternal Grandfather      Cancer No family hx of         no skin cancer     Skin Cancer No family hx of         no famiy hx of skin cancer     Glaucoma No family hx of      Macular Degeneration No family hx of      Social History   Social History     Tobacco Use     Smoking status: Current Every Day Smoker     Packs/day: 0.25     Years: 40.00     Pack years: 10.00     Types: Cigarettes     Smokeless tobacco: Former User   Substance Use Topics     Alcohol use: No     Alcohol/week: 0.0 standard drinks     Comment: Last etoh in 2007     Drug use: Not Currently     Types: Marijuana, Methamphetamines      Past medical history, past surgical history, medications, allergies, family history, and social history were reviewed with the patient. No additional pertinent items.       Review of Systems   Constitutional: Negative for fever.   Gastrointestinal: Positive for abdominal pain, nausea and vomiting. Negative for blood in stool and diarrhea.   All other systems reviewed and are negative.    A complete review of systems was performed with pertinent positives and negatives noted in the HPI, and all other systems negative.    Physical Exam   BP: (!) 157/75  Pulse: 95  Temp: 98.3  F (36.8  C)  Resp: 18  SpO2: 99 %  Physical Exam  Vitals reviewed.   Constitutional:       General: He is not in acute distress.     Appearance: He is ill-appearing. He is not diaphoretic.   HENT:      Head: Normocephalic and atraumatic.      Right Ear: External ear normal.      Left Ear: External ear normal.      Nose: Nose normal. No rhinorrhea.      Mouth/Throat:      Mouth: Mucous membranes are moist.   Eyes:      General: No scleral icterus.     Extraocular Movements: Extraocular movements intact.      Conjunctiva/sclera: Conjunctivae normal.    Cardiovascular:      Rate and Rhythm: Normal rate and regular rhythm.      Heart sounds: Normal heart sounds.   Pulmonary:      Effort: No respiratory distress.      Breath sounds: Normal breath sounds.   Abdominal:      General: Bowel sounds are normal.      Palpations: Abdomen is soft.      Tenderness: There is generalized abdominal tenderness.   Musculoskeletal:         General: No deformity. Normal range of motion.      Cervical back: Normal range of motion and neck supple.   Skin:     General: Skin is warm.      Findings: No rash.   Neurological:      Mental Status: Mental status is at baseline.   Psychiatric:         Mood and Affect: Mood normal.         Behavior: Behavior normal.         ED Course      Procedures       Results for orders placed or performed during the hospital encounter of 05/25/22   XR Abdomen 2 Views     Status: None    Narrative    EXAM: XR ABDOMEN 2VIEWS  LOCATION: [facility]  DATE/TIME: 5/25/2022 2:04 AM    INDICATION: Abdominal pain. Nausea.  COMPARISON: None.      Impression    IMPRESSION: Supine and upright views. There are a few dilated small bowel loops with air-fluid levels in the mid abdomen consistent with obstruction. No free air. Small amount of stool in the colon.    CT Abdomen Pelvis w Contrast     Status: None    Narrative    EXAM: CT ABDOMEN PELVIS W CONTRAST  LOCATION: [facility]  DATE/TIME: 5/25/2022 3:43 AM    INDICATION: Bowel obstruction suspected  COMPARISON: 05/20/2022  TECHNIQUE: CT scan of the abdomen and pelvis was performed following injection of IV contrast. Multiplanar reformats were obtained. Dose reduction techniques were used.  CONTRAST: iopamidol (ISOVUE 370) solution 99 mL    FINDINGS:   LOWER CHEST: Normal.    HEPATOBILIARY: Normal.    PANCREAS: Normal.    SPLEEN: Normal.    ADRENAL GLANDS: Normal.    KIDNEYS/BLADDER: There is a minute simple cyst in the right  kidney. The kidneys, ureters, and bladder are otherwise normal.    BOWEL: There is a mechanical small bowel obstruction with transition site involving the proximal to mid jejunum with transition site seen in the lower right abdomen with no underlying lesion/mass identified. The bowel is otherwise normal.    LYMPH NODES: Normal.    VASCULATURE: Mild calcification with no aneurysm.    PELVIC ORGANS: Prior postoperative changes of a penile implant.    MUSCULOSKELETAL: Small fatty umbilical hernia. Mild degenerative changes of the spine most marked in the facet joints.      Impression    IMPRESSION:   1.  Since 05/20/2022 there has been development of a partial mechanical small bowel obstruction with transition site seen in the lower right abdomen with no underlying etiology for the obstruction identified. Greatest radial dimension of the obstructed   small bowel is 4.5 cm.   Comprehensive metabolic panel     Status: Abnormal   Result Value Ref Range    Sodium 140 133 - 144 mmol/L    Potassium 3.2 (L) 3.4 - 5.3 mmol/L    Chloride 102 94 - 109 mmol/L    Carbon Dioxide (CO2) 30 20 - 32 mmol/L    Anion Gap 8 3 - 14 mmol/L    Urea Nitrogen 17 7 - 30 mg/dL    Creatinine 0.66 0.66 - 1.25 mg/dL    Calcium 10.1 8.5 - 10.1 mg/dL    Glucose 166 (H) 70 - 99 mg/dL    Alkaline Phosphatase 98 40 - 150 U/L    AST 17 0 - 45 U/L    ALT 21 0 - 70 U/L    Protein Total 8.0 6.8 - 8.8 g/dL    Albumin 4.0 3.4 - 5.0 g/dL    Bilirubin Total 0.7 0.2 - 1.3 mg/dL    GFR Estimate >90 >60 mL/min/1.73m2   Lipase     Status: Normal   Result Value Ref Range    Lipase 144 73 - 393 U/L   CBC with platelets and differential     Status: None   Result Value Ref Range    WBC Count 8.8 4.0 - 11.0 10e3/uL    RBC Count 5.23 4.40 - 5.90 10e6/uL    Hemoglobin 14.0 13.3 - 17.7 g/dL    Hematocrit 42.3 40.0 - 53.0 %    MCV 81 78 - 100 fL    MCH 26.8 26.5 - 33.0 pg    MCHC 33.1 31.5 - 36.5 g/dL    RDW 13.8 10.0 - 15.0 %    Platelet Count 345 150 - 450 10e3/uL    %  Neutrophils 66 %    % Lymphocytes 24 %    % Monocytes 8 %    % Eosinophils 1 %    % Basophils 1 %    % Immature Granulocytes 0 %    NRBCs per 100 WBC 0 <1 /100    Absolute Neutrophils 5.8 1.6 - 8.3 10e3/uL    Absolute Lymphocytes 2.1 0.8 - 5.3 10e3/uL    Absolute Monocytes 0.7 0.0 - 1.3 10e3/uL    Absolute Eosinophils 0.1 0.0 - 0.7 10e3/uL    Absolute Basophils 0.0 0.0 - 0.2 10e3/uL    Absolute Immature Granulocytes 0.0 <=0.4 10e3/uL    Absolute NRBCs 0.0 10e3/uL   CBC with platelets differential     Status: None    Narrative    The following orders were created for panel order CBC with platelets differential.  Procedure                               Abnormality         Status                     ---------                               -----------         ------                     CBC with platelets and d...[[id number]]                      Final result                 Please view results for these tests on the individual orders.     Medications   0.9% sodium chloride BOLUS (0 mLs Intravenous Stopped 5/25/22 0340)     Followed by   sodium chloride 0.9% infusion (has no administration in time range)   ondansetron (ZOFRAN) injection 4 mg (4 mg Intravenous Given 5/25/22 0156)   HYDROmorphone (PF) (DILAUDID) injection 0.5 mg (0.5 mg Intravenous Given 5/25/22 0409)   iopamidol (ISOVUE-370) solution 99 mL (99 mLs Intravenous Given 5/25/22 0345)   sodium chloride (PF) 0.9% PF flush 75 mL (75 mLs Intravenous Given 5/25/22 0347)   pantoprazole (PROTONIX) IV push injection 40 mg (40 mg Intravenous Given 5/25/22 0409)        Assessments & Plan (with Medical Decision Making)   54-year-old male presents to us with a chief complaint of nausea vomiting abdominal pain.  Differential includes but not limited to bowel obstruction, abdominal pain, gastritis, gastroenteritis, pancreatitis.  Vital signs today show hypertension.  Patient is afebrile.  Labs were reviewed and interpreted.  There is significant for a slightly low potassium  at 3.2.  CBC was normal.  Acute abdominal series was ordered and interpreted.  This was suspicious for bowel obstruction.  CT scan was then ordered that does show evidence of obstruction.  Patient was given Zofran and Dilaudid for his pain which did help.  Patient will be admitted to the hospital.    I have reviewed the nursing notes. I have reviewed the findings, diagnosis, plan and need for follow up with the patient.    New Prescriptions    No medications on file       Final diagnoses:   Partial small bowel obstruction (H)   I, Katalina Hebert, am serving as a trained medical scribe to document services personally performed by Shaw Stewart DO, based on the provider's statements to me.     I, Shaw Stewart DO, was physically present and have reviewed and verified the accuracy of this note documented by Katalina Hebert.      --  Shaw Stewart DO  [facility] EMERGENCY DEPARTMENT  5/25/2022     Shaw Stewart DO  05/25/22 0424

## 2022-05-25 NOTE — CONSULTS
EGS Surgery Consult  May 25, 2022    Andrew Lockwood  : 1967    Date of Service: 2022 8:41 AM    Assessment and Plan:  Andrew Lockwood is a 54 year old male with pmhx significant for  HIV/AIDS on Biktarvy, diabetes mellitus type 2, GERD, CNS toxoplasmosis, hyperlipidemia, prior COVID-19 infection (2022), current methamphetamine use, and recurrent SBOs with surgical history significant for distant appendicectomy as well as ex lap w/ lysis of adhesions (, ), who presents with signs/sx of SBO since yesterday evening, likely d/t adhesive disease. He is distended but not peritoneal. Not concerning for bowel ischemia at this time.     - recommended NG with decompression to the pt, who again refused NG placement. He will let team know if he changes his mind  - would not give gastrografin at this time given risk of aspiration  - if pt accepts NG, would consider gastrografin after decompression  - keep NPO  - fluid resuscitation  - added on lactate to guide in resuscitation  - daily BMP, Mg, Phos;  replete as needed  - serial abdominal exams  - consider addiction medicine consult if pt willing   - surgery will continue to follow    Discussed with Dr. Meza-Luly West MD  General Surgery PGY2      --------------------------------------------------------------------  History of Present Illness:    Andrew Lockwood is a 54 year old male with pmhx significant for  HIV/AIDS on Biktarvy, diabetes mellitus type 2, GERD, CNS toxoplasmosis, hyperlipidemia, prior COVID-19 infection (2022), current methamphetamine use, and recurrent SBOs with surgical history significant for distant appendicectomy as well as ex lap w/ lysis of adhesions (, ), and several recent admissions for SBO- most recently in March (left AMA), who presents with abdominal pain since last night. His last BM was last night. Has felt bloated and is not passing gas. Pain is crampy and diffuse. Also has ongoing  nausea, with emesis last night. No fevers or chills.    Surgical history is notable for laparoscopic appendectomy in early 2018 c/b paracolic abscess. Since then has had recurrent SBOs. In 4/16/2018 he underwent laparoscopic exploration with ANASTASIIA (Dr. Dowling). He more recently had 2 admissions for SBO in March, left AMA prior to possible surgical intervention.     Pt endorses active methamphetamine use, has caused AMA discharges in the past.     Past Medical History:  Past Medical History:   Diagnosis Date     AIDS (H)      Allergic rhinitis due to other allergen     DNS     Anal dysplasia      Chronic abdominal pain      CNS toxoplasmosis (H)      Diabetes type 2, controlled (H)      GERD (gastroesophageal reflux disease)      History of seizure      History of substance abuse (H)      HIV (human immunodeficiency virus infection) (H)      HLD (hyperlipidemia)      Lung nodules      Periungual wart      PTSD (post-traumatic stress disorder)      Sleep apnea     doesn't use CPAP       Past Surgical History  Past Surgical History:   Procedure Laterality Date     ANOSCOPY N/A 9/9/2020    Procedure: Exam Under Anesthesia, ANOSCOPY, fulgeration of rectal fissures with Rectal Biopsies;  Surgeon: Thanh Lundberg MD;  Location: [facility] OR     COLONOSCOPY Left 1/22/2016    Procedure: COMBINED COLONOSCOPY, SINGLE OR MULTIPLE BIOPSY/POLYPECTOMY BY BIOPSY;  Surgeon: Clark Saini MD;  Location: [facility] GI     HC EXPLORE UNDESC TESTIS,INGUIN/SCROTAL       LAPAROSCOPIC APPENDECTOMY N/A 1/31/2018    Procedure: LAPAROSCOPIC APPENDECTOMY;  LAPAROSCOPIC APPENDECTOMY;  Surgeon: Dawn Holt MD;  Location: [facility] OR     LAPAROSCOPY DIAGNOSTIC (GENERAL) N/A 7/26/2016    Procedure: LAPAROSCOPY DIAGNOSTIC (GENERAL);  Surgeon: Susannah Arriaga MD;  Location: [facility] OR     LAPAROSCOPY DIAGNOSTIC (GENERAL) N/A 4/16/2018    Procedure: LAPAROSCOPY DIAGNOSTIC (GENERAL);  Diagnostic laparoscopy and lysis of adhesions;  Surgeon: Nitesh  Prince Swanson MD;  Location: [facility] OR     OPTICAL TRACKING SYSTEM CRANIOTOMY, EXCISE TUMOR, COMBINED Left 4/10/2015    Procedure: COMBINED OPTICAL TRACKING SYSTEM CRANIOTOMY, EXCISE TUMOR;  Surgeon: Mirlande Colmenares MD;  Location: [facility] OR     REPAIR GAMEKEEPER'S THUMB Right 12/2/2016    Procedure: REPAIR LIGAMENT ULNAR COLLATERAL THUMB (GAMEKEEPER'S);  Surgeon: Evin Zamorano MD;  Location:  OR     [facility] NONSPECIFIC PROCEDURE      right forearm fracture       Family History:  Family History   Problem Relation Age of Onset     Diabetes Brother      Diabetes Father      Alzheimer Disease Father      Unknown/Adopted Mother      Diabetes Paternal Grandfather      Cancer No family hx of         no skin cancer     Skin Cancer No family hx of         no famiy hx of skin cancer     Glaucoma No family hx of      Macular Degeneration No family hx of        Social History:  Social History     Socioeconomic History     Marital status: Single     Spouse name: Not on file     Number of children: Not on file     Years of education: Not on file     Highest education level: Not on file   Occupational History     Occupation:      Comment: 5 years; temp agency     Occupation: Unemployed   Tobacco Use     Smoking status: Current Every Day Smoker     Packs/day: 0.25     Years: 40.00     Pack years: 10.00     Types: Cigarettes     Smokeless tobacco: Former User   Substance and Sexual Activity     Alcohol use: No     Alcohol/week: 0.0 standard drinks     Comment: Last etoh in 2007     Drug use: Not Currently     Types: Marijuana, Methamphetamines     Sexual activity: Not Currently     Partners: Female, Male     Comment: Last sexual activity 2008   Other Topics Concern     Parent/sibling w/ CABG, MI or angioplasty before 65F 55M? Not Asked   Social History Narrative    Born in [facility].  Came to the [place] in 1993.  Last traveled to visit family in 2008.        1/7/2019 - Homeless. Working with a  at [employer]  Us trying to get housing. Sexually active with two partners recently using condoms 100% of the time. Smokes occasionally, not for the last 4 weeks.        1/7/2020 at [facility] since 1/1/2020. 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  Care established with ID and endocrine     Social Determinants of Health     Financial Resource Strain: Not on file   Food Insecurity: Not on file   Transportation Needs: Not on file   Physical Activity: Not on file   Stress: Not on file   Social Connections: Not on file   Intimate Partner Violence: Not on file   Housing Stability: Not on file       Medications:  Current Outpatient Medications   Medication Sig Dispense Refill     Alcohol Swabs PADS Use to swab the area of the injection or todd as directed Per insurance coverage 100 each 0     bictegravir-emtricitabine-tenofovir (BIKTARVY) -25 MG per tablet Take 1 tablet by mouth daily 30 tablet 0     blood glucose (NO BRAND SPECIFIED) test strip Use to test blood sugar 4 times daily or as directed. To accompany:whatever is covered 360 strip 3     blood glucose calibration (NO BRAND SPECIFIED) solution Used to calibrate the blood glucose monitor as needed and as directed.  To accompany  blood glucose brands per insurance coverage 1 each 0     blood glucose monitoring (NO BRAND SPECIFIED) meter device kit Use as directed. Per insurance coverage. 1 kit 0     ciprofloxacin (CIPRO) 500 MG tablet Take 1 tablet (500 mg) by mouth 2 times daily 26 tablet 0     empagliflozin (JARDIANCE) 25 MG TABS tablet Take 1 tablet (25 mg) by mouth daily 90 tablet 0     glucose 40 % (400 mg/mL) gel 15 g every 15 minutes by mouth as needed for low blood sugar. Oral gel is preferable for conscious and able to swallow patient. 112.5 g 0     insulin detemir (LEVEMIR PEN) 100 UNIT/ML pen Inject 20 Units Subcutaneous 2 times daily 15 mL 0     insulin pen needle (BD SCOTT U/F) 32G X 4 MM miscellaneous USE 3 TO 4 NEEDLES DAILY. Follow up visit needed. Call  to  "schedule. 400 each 0     loperamide (IMODIUM A-D) 2 MG tablet Take 1 tablet (2 mg) by mouth 4 times daily as needed for diarrhea 12 tablet 0     metFORMIN (GLUCOPHAGE-XR) 500 MG 24 hr tablet Take 1 tablet (500 mg) by mouth daily (with dinner) 90 tablet 0     NOVOLOG FLEXPEN 100 UNIT/ML soln Inject 5 Units Subcutaneous 3 times daily (with meals) 75 mL 0     pantoprazole (PROTONIX) 40 MG EC tablet Take 1 tablet (40 mg) by mouth daily 30 tablet 6     polyethylene glycol (MIRALAX) 17 GM/Dose powder Take 17 g by mouth daily 510 g 3     Sharps Container MISC Use as directed to dispose of needles, lancets and other sharps. Per Insurance coverage 1 each 0     simethicone (MYLICON) 80 MG chewable tablet Take 1 tablet (80 mg) by mouth every 6 hours as needed for cramping 90 tablet 0     STATIN NOT PRESCRIBED (INTENTIONAL) Please choose reason not prescribed, below       thin (NO BRAND SPECIFIED) lancets Use with lanceting device. To accompany: Test 4 times daily with whatever is covered 360 each 3       Allergies:     Allergies   Allergen Reactions     Metformin      Abdominal pain     Tylenol [Acetaminophen] Itching     Dulaglutide Rash     Insulin Lispro Rash     Patient reported     Penicillin V Other (See Comments) and Rash     Diffuse maculopapular rash + feels \"high\", per pt.        Review of Symptoms:  A 10 point review of symptoms has been conducted and is negative except for that mentioned in the above HPI.    Physical Exam:    Blood pressure 129/87, pulse 84, temperature 98.2  F (36.8  C), resp. rate 18, SpO2 98 %.  Gen:    Lying in bed, appears uncomfortable, hiccuping  HEENT: Normocephalic and atraumatic  CV:  RRR by peripheral pulse  Pulm:  Non-labored breathing on RA  Abd:  Moderately distended with firmness in the mid-abdomen, diffusely TTP without rebound. +voluntary guarding  Ext:  Warm and well perfused, no obvious deformities    Labs:  CBC RESULTS:   Recent Labs   Lab Test 05/25/22  0141   WBC 8.8   RBC " 5.23   HGB 14.0   HCT 42.3   MCV 81   MCH 26.8   MCHC 33.1   RDW 13.8        Last Basic Metabolic Panel:  Lab Results   Component Value Date     05/25/2022     06/24/2021      Lab Results   Component Value Date    POTASSIUM 3.2 05/25/2022    POTASSIUM 2.5 12/12/2021    POTASSIUM 3.2 06/24/2021     Lab Results   Component Value Date    CHLORIDE 102 05/25/2022    CHLORIDE 107 06/24/2021     Lab Results   Component Value Date    LINDA 10.1 05/25/2022    LINDA 7.3 06/24/2021     Lab Results   Component Value Date    CO2 30 05/25/2022    CO2 20 06/24/2021     Lab Results   Component Value Date    BUN 17 05/25/2022    BUN 18 06/24/2021     Lab Results   Component Value Date    CR 0.66 05/25/2022    CR 0.81 06/24/2021     Lab Results   Component Value Date     05/25/2022     05/25/2022     06/24/2021       Imaging:  EXAM: CT ABDOMEN PELVIS W CONTRAST  LOCATION: [facility]  DATE/TIME: 5/25/2022 3:43 AM     INDICATION: Bowel obstruction suspected  COMPARISON: 05/20/2022  TECHNIQUE: CT scan of the abdomen and pelvis was performed following injection of IV contrast. Multiplanar reformats were obtained. Dose reduction techniques were used.  CONTRAST: iopamidol (ISOVUE 370) solution 99 mL     FINDINGS:   LOWER CHEST: Normal.     HEPATOBILIARY: Normal.     PANCREAS: Normal.     SPLEEN: Normal.     ADRENAL GLANDS: Normal.     KIDNEYS/BLADDER: There is a minute simple cyst in the right kidney. The kidneys, ureters, and bladder are otherwise normal.     BOWEL: There is a mechanical small bowel obstruction with transition site involving the proximal to mid jejunum with transition site seen in the lower right abdomen with no underlying lesion/mass identified. The bowel is otherwise normal.     LYMPH NODES: Normal.     VASCULATURE: Mild calcification with no aneurysm.     PELVIC ORGANS: Prior postoperative changes of a penile implant.     MUSCULOSKELETAL:  Small fatty umbilical hernia. Mild degenerative changes of the spine most marked in the facet joints.                                                                      IMPRESSION:   1.  Since 05/20/2022 there has been development of a partial mechanical small bowel obstruction with transition site seen in the lower right abdomen with no underlying etiology for the obstruction identified. Greatest radial dimension of the obstructed   small bowel is 4.5 cm.

## 2022-05-25 NOTE — PLAN OF CARE
Goal Outcome Evaluation:    Plan of Care Reviewed With: Patient    Overal Patient Progress: Improving    Shift: 4593-6013  Neuro: A&Ox4  Cardiac: WNL  Respiratory: WNL  GI: Pt had one bowel movement during this shift  Diet: NPO  Lines: L forearm infusing Lactated Ringers  Activity: Independent  Pain: Denies    Pt denied NG tube insertion.    /87 (BP Location: Left arm)   Pulse 84   Temp 98.2  F (36.8  C)   Resp 18   SpO2 98%

## 2022-05-25 NOTE — H&P
[facility]    History and Physical - [facility]       Date of Admission:  5/25/2022    Assessment & Plan      Andrew Lockwood is a 54 year old male with a past medical history of HIV/AIDS on Biktarvy, poorly controlled diabetes mellitus type 2, GERD, CNS toxoplasmosis, hyperlipidemia, past COVID-19 infection (01/12/2022), s/p appendectomy, ex lap w/ lysis of adhesions (2016, 2018), recurrent SBOs with several recent admissions for recurrent SBOs, managed conservatively with patient leaving AMA, admitted 5/25/22 with recurrent abdominal pain and CT findings consistent with partial SBO.     # Recurrent SBO  # Abdominal pain  # Nausea/vomiting  53 y/o w/ hx recurrent SBO with many prior hospitalizations, presents 5/25 early AM with acute onset abdominal pain 5/24. In ED, CT showed pSBO, a change from CT on 5/20. Pt HDS, well-appearing with no active hydromorphone in system (over 2 hr prior to writer's exam), with only mild tenderness on abdominal exam, otherwise unremarkable. Pt declined NG in ED, which he historically does. Anticipate spontaneous resolution with conservative management, but given new CT findings w/ pSBO and history of recommendation of surgical intervention, will ask surgery colleagues to weigh in.    - Declined NG  - Pain: acetaminophen PRN, PO dilaudid 2-4mg q4h PRN, IV dilaudid for breakthrough 0.3-0.5 mg q2h PRN  - IV fluids: continue ED fluids  cc/hr  - NPO except meds  - Nausea: zofran + compazine  - General surgery consulted, appreciate recs  - serial abdominal exams - q6hrs     # Poorly controlled type II DM  Last A1c 11.2 in 02/2022. Home meds include jardiance 25 mg, metformin 500mg daily - reports adherence. Not taking insulin though has previously been prescribed. Pt concerned metformin causing diarrhea.   - MISS q4h while NPO  - Consider restarting prior Levemir at reduced dose of 10 u BID   - hypoglycemia  "protocol     # Hypokalemia  Potassium 3.2 on admission. Replaced in ED.  - Standard replacement protocol.     # Episodes of loose stools  # Possible hx of SIBO  Multiple references to SIBO in patient's chart dating back several years based on patient's sx of recurrent diarrhea and bloating; has not had carbohydrate breath test for official dx. During last 2 hospitalizations in March 2022, patient was placed on BID ciprofloxacin for possible SIBO. Course was supposed to be 14 days, but patient compliance outside of hospital is unclear. Patient is endorsing large volume loose stools yesterday and today, which could be from SIBO or possible C.Diff given recent abx course. Consider C. Diff testing if loose stools persist.      # Substance use disorder  # History of housing instability  Several years' hx meth use. Smokes; does not inject. Last use 5/24. Interested in detox. Has had interval sobriety over the last few years. No other substance use concerns, including EtOH. Living at [facility], currently working to get independent housing. Was working to get his ID? In the days prior to admission. Declines talking to SW with IP; says he has more resources through [facility].   - Discuss Wellbutrin, other options for meth treatment/withdrawal w/ pt  - Look into options for detox    Chronic/Stable:     #HIV  Stable on Biktarvy, which patient has brought with him. Last CD4 count 5/24 was 526.  - PTA Biktarvy     #GERD  - PTA protonix 40 daily       Diet: NPO for Medical/Clinical Reasons Except for: Meds  DVT Prophylaxis: Low Risk/Ambulatory with no VTE prophylaxis indicated  Potts Catheter: Not present  Fluids: mIVF  ml/hr  Central Lines: None  Cardiac Monitoring: None  Code Status: Full Code    Clinically Significant Risk Factors Present on Admission                  # Overweight: Estimated body mass index is 27.46 kg/m  as calculated from the following:    Height as of 5/20/22: 1.626 m (5' 4\").    Weight as of 5/20/22: 72.6 kg " "(160 lb).      Disposition Plan   Expected Discharge: 05/26/2022   Anticipated discharge location:  Awaiting care coordination huddle   Delays:   treatment of SBO, improvement in abdominal pain and tolerating PO.        The patient's care was discussed with the Attending Physician, Dr. Key.    Anabel Jenkins MD  [facility]  [facility]  Securely message with the Vocera Web Console (learn more here)  Text page via [facility] Paging/Directory       ______________________________________________________________________    Chief Complaint   Abdominal pain    History is obtained from the patient and EMR.    History of Present Illness   Andrew Lockwood is a 54 year old male with a past medical history of HIV/AIDS on Biktarvy, poorly controlled diabetes mellitus type 2, GERD, CNS toxoplasmosis, hyperlipidemia, past COVID-19 infection (01/12/2022), hypokalemia, s/p appendectomy, ex lap w/ lysis of adhesions (2016, 2018), recurrent SBOs with several recent admissions for recurrent SBOs who presented to the ED via EMS with abdominal pain.     Patient had first presented, according to ED triage notes, 5/24 AM w/ abdominal pain but left due to resolution, only to return overnight.  From Dr. Stewart ED note, confirmed with patinet: \"Patient reports diffuse abdominal pain and vomiting for the past 3 hours. Symptoms are worsening.  He has not taken anything for pain. He had a normal BM 3 hours ago. He denies fever, diarrhea, or blood in the stool.  His symptoms are very similar to his prior small bowel obstruction pain and nausea and vomiting.\"    Additionally denies CP, SOB, rash, peripheral swelling. Does not think he has passed gas while in ED. Asking for popscicles.     From Dr. Palma 4/29 H&P:   \"He has had 6 prior hospitalizations in the past year for small bowel obstructions. These are generally managed nonoperatively and he tends to refuse NG " "tube.\"  His most recent admission for small bowel obstruction took place from 4/29/22-4/3022 - general surgery was consulted, there was no surgical indication at that time. Patient left AMA after 1 day as he has previously.    Had also presented to ED 5/20 for abdominal pain, CT showed no SBO, and he was discharged home.    Patient also reports smoking meth 5/24. Meth is drug of choice. Does not inject. No EtOH use. Living at [facility].    ED course:   Afebrile, HDS, labs significant for K of 3.2. No leukocytosis. Hgb normal. COVID negative.   CT AP \"Since 05/20/2022 there has been development of a partial mechanical small bowel obstruction with transition site seen in the lower right abdomen with no underlying etiology for the obstruction identified. Greatest radial dimension of the obstructed   small bowel is 4.5 cm.\"  Abdominal XR: \"Supine and upright views. There are a few dilated small bowel loops with air-fluid levels in the mid abdomen consistent with obstruction. No free air. Small amount of stool in the colon.\"   Interventions: Pt declined NG. S/p 1 L bolus NS, followed by  ml/hr. Pantoprazole 40 mg IV x1, potassium replaced. IV dilaudid x2 doses, last 0410. Declined NG.    Review of Systems    The 10 point Review of Systems is negative other than noted in the HPI or here.     Past Medical History    I have reviewed this patient's medical history and updated it with pertinent information if needed.   Past Medical History:   Diagnosis Date     AIDS (H)      Allergic rhinitis due to other allergen     DNS     Anal dysplasia      Chronic abdominal pain      CNS toxoplasmosis (H)      Diabetes type 2, controlled (H)      GERD (gastroesophageal reflux disease)      History of seizure      History of substance abuse (H)      HIV (human immunodeficiency virus infection) (H)      HLD (hyperlipidemia)      Lung nodules      Periungual wart      PTSD (post-traumatic stress disorder)      Sleep apnea     doesn't use " CPAP        Past Surgical History   I have reviewed this patient's surgical history and updated it with pertinent information if needed.  Past Surgical History:   Procedure Laterality Date     ANOSCOPY N/A 9/9/2020    Procedure: Exam Under Anesthesia, ANOSCOPY, fulgeration of rectal fissures with Rectal Biopsies;  Surgeon: Thanh Lundberg MD;  Location: [facility] OR     COLONOSCOPY Left 1/22/2016    Procedure: COMBINED COLONOSCOPY, SINGLE OR MULTIPLE BIOPSY/POLYPECTOMY BY BIOPSY;  Surgeon: Clark Saini MD;  Location: [facility] GI     HC EXPLORE UNDESC TESTIS,INGUIN/SCROTAL       LAPAROSCOPIC APPENDECTOMY N/A 1/31/2018    Procedure: LAPAROSCOPIC APPENDECTOMY;  LAPAROSCOPIC APPENDECTOMY;  Surgeon: Dawn Holt MD;  Location: [facility] OR     LAPAROSCOPY DIAGNOSTIC (GENERAL) N/A 7/26/2016    Procedure: LAPAROSCOPY DIAGNOSTIC (GENERAL);  Surgeon: Susannah Arriaga MD;  Location: [facility] OR     LAPAROSCOPY DIAGNOSTIC (GENERAL) N/A 4/16/2018    Procedure: LAPAROSCOPY DIAGNOSTIC (GENERAL);  Diagnostic laparoscopy and lysis of adhesions;  Surgeon: Prince Dowling MD;  Location: [facility] OR     OPTICAL TRACKING SYSTEM CRANIOTOMY, EXCISE TUMOR, COMBINED Left 4/10/2015    Procedure: COMBINED OPTICAL TRACKING SYSTEM CRANIOTOMY, EXCISE TUMOR;  Surgeon: Mirlande Colmenares MD;  Location: [facility] OR     REPAIR GAMEKEEPER'S THUMB Right 12/2/2016    Procedure: REPAIR LIGAMENT ULNAR COLLATERAL THUMB (GAMEKEEPER'S);  Surgeon: Evin Zamorano MD;  Location:  OR     [facility] NONSPECIFIC PROCEDURE      right forearm fracture        Social History   I have reviewed this patient's social history and updated it with pertinent information if needed. Andrew Lockwood  reports that he has been smoking cigarettes. He has a 10.00 pack-year smoking history. He has quit using smokeless tobacco. He reports previous drug use. Drugs: Marijuana and Methamphetamines. He reports that he does not drink alcohol.    Family History   I have reviewed this  patient's family history and updated it with pertinent information if needed.  Family History   Problem Relation Age of Onset     Diabetes Brother      Diabetes Father      Alzheimer Disease Father      Unknown/Adopted Mother      Diabetes Paternal Grandfather      Cancer No family hx of         no skin cancer     Skin Cancer No family hx of         no famiy hx of skin cancer     Glaucoma No family hx of      Macular Degeneration No family hx of        Prior to Admission Medications   Prior to Admission Medications   Prescriptions Last Dose Informant Patient Reported? Taking?   Alcohol Swabs PADS  Self No No   Sig: Use to swab the area of the injection or todd as directed Per insurance coverage   NOVOLOG FLEXPEN 100 UNIT/ML soln   No No   Sig: Inject 5 Units Subcutaneous 3 times daily (with meals)   STATIN NOT PRESCRIBED (INTENTIONAL)  Self No No   Sig: Please choose reason not prescribed, below   Sharps Container MISC  Self No No   Sig: Use as directed to dispose of needles, lancets and other sharps. Per Insurance coverage   bictegravir-emtricitabine-tenofovir (BIKTARVY) -25 MG per tablet   No No   Sig: Take 1 tablet by mouth daily   blood glucose (NO BRAND SPECIFIED) test strip  Self No No   Sig: Use to test blood sugar 4 times daily or as directed. To accompany:whatever is covered   blood glucose calibration (NO BRAND SPECIFIED) solution  Self No No   Sig: Used to calibrate the blood glucose monitor as needed and as directed.  To accompany  blood glucose brands per insurance coverage   blood glucose monitoring (NO BRAND SPECIFIED) meter device kit  Self No No   Sig: Use as directed. Per insurance coverage.   ciprofloxacin (CIPRO) 500 MG tablet   No No   Sig: Take 1 tablet (500 mg) by mouth 2 times daily   empagliflozin (JARDIANCE) 25 MG TABS tablet   No No   Sig: Take 1 tablet (25 mg) by mouth daily   glucose 40 % (400 mg/mL) gel  Self No No   Sig: 15 g every 15 minutes by mouth as needed for low blood  "sugar. Oral gel is preferable for conscious and able to swallow patient.   insulin detemir (LEVEMIR PEN) 100 UNIT/ML pen  Self No No   Sig: Inject 20 Units Subcutaneous 2 times daily   insulin pen needle (BD SCOTT U/F) 32G X 4 MM miscellaneous   No No   Sig: USE 3 TO 4 NEEDLES DAILY. Follow up visit needed. Call  to schedule.   loperamide (IMODIUM A-D) 2 MG tablet   No No   Sig: Take 1 tablet (2 mg) by mouth 4 times daily as needed for diarrhea   metFORMIN (GLUCOPHAGE-XR) 500 MG 24 hr tablet   No No   Sig: Take 1 tablet (500 mg) by mouth daily (with dinner)   pantoprazole (PROTONIX) 40 MG EC tablet  Self No No   Sig: Take 1 tablet (40 mg) by mouth daily   polyethylene glycol (MIRALAX) 17 GM/Dose powder   No No   Sig: Take 17 g by mouth daily   simethicone (MYLICON) 80 MG chewable tablet   No No   Sig: Take 1 tablet (80 mg) by mouth every 6 hours as needed for cramping   thin (NO BRAND SPECIFIED) lancets  Self No No   Sig: Use with lanceting device. To accompany: Test 4 times daily with whatever is covered      Facility-Administered Medications: None     Allergies   Allergies   Allergen Reactions     Metformin      Abdominal pain     Tylenol [Acetaminophen] Itching     Dulaglutide Rash     Insulin Lispro Rash     Patient reported     Penicillin V Other (See Comments) and Rash     Diffuse maculopapular rash + feels \"high\", per pt.        Physical Exam   Vital Signs: Temp: 98.2  F (36.8  C) Temp src: Oral BP: 129/87 Pulse: 84   Resp: 18 SpO2: 98 % O2 Device: None (Room air)    Weight: 0 lbs 0 oz     Physical Exam  Constitutional:       General: He is not in acute distress.     Appearance: He is not diaphoretic.   HENT:      Head: Normocephalic and atraumatic.      Mouth/Throat:      Dentition: Abnormal dentition. Dental caries present.      Pharynx: Oropharynx is clear. No oropharyngeal exudate.   Cardiovascular:      Rate and Rhythm: Normal rate and regular rhythm.      Pulses: Normal pulses.      Heart " sounds: Normal heart sounds.   Pulmonary:      Effort: Pulmonary effort is normal.      Breath sounds: Normal breath sounds.   Abdominal:      General: Bowel sounds are normal. There is distension (mild).      Palpations: Abdomen is soft.      Tenderness: There is abdominal tenderness (mild, lower quadrants). There is no guarding or rebound.   Musculoskeletal:      Right lower leg: No edema.      Left lower leg: No edema.   Skin:     General: Skin is warm and dry.      Findings: No lesion (on exposed distal limbs, face).   Neurological:      Mental Status: He is alert and oriented to person, place, and time.   Psychiatric:         Attention and Perception: Attention and perception normal.         Mood and Affect: Mood normal.         Speech: Speech is rapid and pressured.         Thought Content: Thought content normal.         Cognition and Memory: Cognition normal.      Comments: Fidgity, picking at skin       Data   Data reviewed today: I reviewed all medications, new labs and imaging results over the last 24 hours. I personally reviewed no images or EKG's today.    Recent Labs   Lab 05/25/22  0736 05/25/22  0141 05/24/22  1051 05/20/22  1506   WBC  --  8.8 7.2 9.2   HGB  --  14.0 12.7* 15.4   MCV  --  81 84 85   PLT  --  345 306 399   NA  --  140 139 140   POTASSIUM  --  3.2* 3.5 4.3   CHLORIDE  --  102 106 110*   CO2  --  30 26 19*   BUN  --  17 14 22   CR  --  0.66 0.65* 0.87   ANIONGAP  --  8 7 11   LINDA  --  10.1 9.5 9.4   * 166* 198* 142*   ALBUMIN  --  4.0 3.6 4.1   PROTTOTAL  --  8.0 7.2 8.7   BILITOTAL  --  0.7 0.5 0.6   ALKPHOS  --  98 99 106   ALT  --  21 16 20   AST  --  17 16 10   LIPASE  --  144  --  126     Recent Results (from the past 24 hour(s))   XR Abdomen 2 Views    Narrative    EXAM: XR ABDOMEN 2VIEWS  LOCATION: [facility]  DATE/TIME: 5/25/2022 2:04 AM    INDICATION: Abdominal pain. Nausea.  COMPARISON: None.      Impression    IMPRESSION:  Supine and upright views. There are a few dilated small bowel loops with air-fluid levels in the mid abdomen consistent with obstruction. No free air. Small amount of stool in the colon.    CT Abdomen Pelvis w Contrast    Narrative    EXAM: CT ABDOMEN PELVIS W CONTRAST  LOCATION: [facility]  DATE/TIME: 5/25/2022 3:43 AM    INDICATION: Bowel obstruction suspected  COMPARISON: 05/20/2022  TECHNIQUE: CT scan of the abdomen and pelvis was performed following injection of IV contrast. Multiplanar reformats were obtained. Dose reduction techniques were used.  CONTRAST: iopamidol (ISOVUE 370) solution 99 mL    FINDINGS:   LOWER CHEST: Normal.    HEPATOBILIARY: Normal.    PANCREAS: Normal.    SPLEEN: Normal.    ADRENAL GLANDS: Normal.    KIDNEYS/BLADDER: There is a minute simple cyst in the right kidney. The kidneys, ureters, and bladder are otherwise normal.    BOWEL: There is a mechanical small bowel obstruction with transition site involving the proximal to mid jejunum with transition site seen in the lower right abdomen with no underlying lesion/mass identified. The bowel is otherwise normal.    LYMPH NODES: Normal.    VASCULATURE: Mild calcification with no aneurysm.    PELVIC ORGANS: Prior postoperative changes of a penile implant.    MUSCULOSKELETAL: Small fatty umbilical hernia. Mild degenerative changes of the spine most marked in the facet joints.      Impression    IMPRESSION:   1.  Since 05/20/2022 there has been development of a partial mechanical small bowel obstruction with transition site seen in the lower right abdomen with no underlying etiology for the obstruction identified. Greatest radial dimension of the obstructed   small bowel is 4.5 cm.

## 2022-05-25 NOTE — ED TRIAGE NOTES
"Pt come sin by EMS, agitated and upset with EMS due to not getting any pain meds in the rig.  Pt complains of stomach ache. Pt upset upon arrival and unable to maintain much information.  Pt did check in to the ED earlier tonight but left without being seen \"because he felt better\"      "

## 2022-05-26 LAB
ANION GAP SERPL CALCULATED.3IONS-SCNC: 8 MMOL/L (ref 3–14)
BUN SERPL-MCNC: 10 MG/DL (ref 7–30)
CALCIUM SERPL-MCNC: 8.6 MG/DL (ref 8.5–10.1)
CHLORIDE BLD-SCNC: 109 MMOL/L (ref 94–109)
CO2 SERPL-SCNC: 22 MMOL/L (ref 20–32)
CREAT SERPL-MCNC: 0.68 MG/DL (ref 0.66–1.25)
ERYTHROCYTE [DISTWIDTH] IN BLOOD BY AUTOMATED COUNT: 14.1 % (ref 10–15)
GFR SERPL CREATININE-BSD FRML MDRD: >90 ML/MIN/1.73M2
GLUCOSE BLD-MCNC: 94 MG/DL (ref 70–99)
GLUCOSE BLDC GLUCOMTR-MCNC: 109 MG/DL (ref 70–99)
GLUCOSE BLDC GLUCOMTR-MCNC: 112 MG/DL (ref 70–99)
GLUCOSE BLDC GLUCOMTR-MCNC: 193 MG/DL (ref 70–99)
GLUCOSE BLDC GLUCOMTR-MCNC: 216 MG/DL (ref 70–99)
GLUCOSE BLDC GLUCOMTR-MCNC: 225 MG/DL (ref 70–99)
GLUCOSE BLDC GLUCOMTR-MCNC: 87 MG/DL (ref 70–99)
HAV IGM SERPL QL IA: NONREACTIVE
HCT VFR BLD AUTO: 37.1 % (ref 40–53)
HGB BLD-MCNC: 11.7 G/DL (ref 13.3–17.7)
HIV1 RNA # PLAS NAA DL=20: NOT DETECTED COPIES/ML
MAGNESIUM SERPL-MCNC: 1.7 MG/DL (ref 1.6–2.3)
MCH RBC QN AUTO: 26.4 PG (ref 26.5–33)
MCHC RBC AUTO-ENTMCNC: 31.5 G/DL (ref 31.5–36.5)
MCV RBC AUTO: 84 FL (ref 78–100)
PHOSPHATE SERPL-MCNC: 3 MG/DL (ref 2.5–4.5)
PLATELET # BLD AUTO: 287 10E3/UL (ref 150–450)
POTASSIUM BLD-SCNC: 3.4 MMOL/L (ref 3.4–5.3)
RBC # BLD AUTO: 4.44 10E6/UL (ref 4.4–5.9)
SODIUM SERPL-SCNC: 139 MMOL/L (ref 133–144)
WBC # BLD AUTO: 6.7 10E3/UL (ref 4–11)

## 2022-05-26 PROCEDURE — 250N000013 HC RX MED GY IP 250 OP 250 PS 637: Performed by: STUDENT IN AN ORGANIZED HEALTH CARE EDUCATION/TRAINING PROGRAM

## 2022-05-26 PROCEDURE — 84100 ASSAY OF PHOSPHORUS: CPT | Performed by: STUDENT IN AN ORGANIZED HEALTH CARE EDUCATION/TRAINING PROGRAM

## 2022-05-26 PROCEDURE — 83735 ASSAY OF MAGNESIUM: CPT | Performed by: STUDENT IN AN ORGANIZED HEALTH CARE EDUCATION/TRAINING PROGRAM

## 2022-05-26 PROCEDURE — 120N000002 HC R&B MED SURG/OB UMMC

## 2022-05-26 PROCEDURE — 96376 TX/PRO/DX INJ SAME DRUG ADON: CPT | Performed by: EMERGENCY MEDICINE

## 2022-05-26 PROCEDURE — 85027 COMPLETE CBC AUTOMATED: CPT | Performed by: STUDENT IN AN ORGANIZED HEALTH CARE EDUCATION/TRAINING PROGRAM

## 2022-05-26 PROCEDURE — 96361 HYDRATE IV INFUSION ADD-ON: CPT | Performed by: EMERGENCY MEDICINE

## 2022-05-26 PROCEDURE — 80048 BASIC METABOLIC PNL TOTAL CA: CPT | Performed by: STUDENT IN AN ORGANIZED HEALTH CARE EDUCATION/TRAINING PROGRAM

## 2022-05-26 PROCEDURE — 36415 COLL VENOUS BLD VENIPUNCTURE: CPT | Performed by: STUDENT IN AN ORGANIZED HEALTH CARE EDUCATION/TRAINING PROGRAM

## 2022-05-26 PROCEDURE — 250N000011 HC RX IP 250 OP 636: Performed by: STUDENT IN AN ORGANIZED HEALTH CARE EDUCATION/TRAINING PROGRAM

## 2022-05-26 PROCEDURE — 99232 SBSQ HOSP IP/OBS MODERATE 35: CPT | Performed by: SURGERY

## 2022-05-26 PROCEDURE — 250N000012 HC RX MED GY IP 250 OP 636 PS 637: Performed by: STUDENT IN AN ORGANIZED HEALTH CARE EDUCATION/TRAINING PROGRAM

## 2022-05-26 PROCEDURE — 86709 HEPATITIS A IGM ANTIBODY: CPT | Performed by: STUDENT IN AN ORGANIZED HEALTH CARE EDUCATION/TRAINING PROGRAM

## 2022-05-26 PROCEDURE — 99233 SBSQ HOSP IP/OBS HIGH 50: CPT | Mod: GC | Performed by: FAMILY MEDICINE

## 2022-05-26 PROCEDURE — 258N000003 HC RX IP 258 OP 636: Performed by: STUDENT IN AN ORGANIZED HEALTH CARE EDUCATION/TRAINING PROGRAM

## 2022-05-26 RX ORDER — BUPROPION HYDROCHLORIDE 150 MG/1
150 TABLET ORAL DAILY
Status: DISCONTINUED | OUTPATIENT
Start: 2022-05-26 | End: 2022-05-26

## 2022-05-26 RX ORDER — BUPROPION HYDROCHLORIDE 300 MG/1
300 TABLET ORAL DAILY
Status: DISCONTINUED | OUTPATIENT
Start: 2022-05-27 | End: 2022-05-29 | Stop reason: HOSPADM

## 2022-05-26 RX ADMIN — HYDROMORPHONE HYDROCHLORIDE 0.5 MG: 1 INJECTION, SOLUTION INTRAMUSCULAR; INTRAVENOUS; SUBCUTANEOUS at 15:45

## 2022-05-26 RX ADMIN — HYDROMORPHONE HYDROCHLORIDE 0.5 MG: 1 INJECTION, SOLUTION INTRAMUSCULAR; INTRAVENOUS; SUBCUTANEOUS at 04:08

## 2022-05-26 RX ADMIN — SODIUM CHLORIDE, POTASSIUM CHLORIDE, SODIUM LACTATE AND CALCIUM CHLORIDE: 600; 310; 30; 20 INJECTION, SOLUTION INTRAVENOUS at 04:08

## 2022-05-26 RX ADMIN — HYDROMORPHONE HYDROCHLORIDE 0.5 MG: 1 INJECTION, SOLUTION INTRAMUSCULAR; INTRAVENOUS; SUBCUTANEOUS at 22:34

## 2022-05-26 RX ADMIN — BICTEGRAVIR SODIUM, EMTRICITABINE, AND TENOFOVIR ALAFENAMIDE FUMARATE 1 TABLET: 50; 200; 25 TABLET ORAL at 09:03

## 2022-05-26 RX ADMIN — PANTOPRAZOLE SODIUM 40 MG: 40 TABLET, DELAYED RELEASE ORAL at 09:02

## 2022-05-26 RX ADMIN — HYDROMORPHONE HYDROCHLORIDE 0.5 MG: 1 INJECTION, SOLUTION INTRAMUSCULAR; INTRAVENOUS; SUBCUTANEOUS at 09:03

## 2022-05-26 RX ADMIN — INSULIN ASPART 2 UNITS: 100 INJECTION, SOLUTION INTRAVENOUS; SUBCUTANEOUS at 11:46

## 2022-05-26 RX ADMIN — INSULIN ASPART 2 UNITS: 100 INJECTION, SOLUTION INTRAVENOUS; SUBCUTANEOUS at 15:50

## 2022-05-26 RX ADMIN — INSULIN ASPART 2 UNITS: 100 INJECTION, SOLUTION INTRAVENOUS; SUBCUTANEOUS at 19:48

## 2022-05-26 RX ADMIN — BUPROPION HYDROCHLORIDE 150 MG: 150 TABLET, FILM COATED, EXTENDED RELEASE ORAL at 09:02

## 2022-05-26 RX ADMIN — HYDROMORPHONE HYDROCHLORIDE 0.5 MG: 1 INJECTION, SOLUTION INTRAMUSCULAR; INTRAVENOUS; SUBCUTANEOUS at 19:51

## 2022-05-26 RX ADMIN — DOCUSATE SODIUM 50 MG AND SENNOSIDES 8.6 MG 1 TABLET: 8.6; 5 TABLET, FILM COATED ORAL at 09:02

## 2022-05-26 RX ADMIN — DOCUSATE SODIUM 50 MG AND SENNOSIDES 8.6 MG 2 TABLET: 8.6; 5 TABLET, FILM COATED ORAL at 19:46

## 2022-05-26 ASSESSMENT — ACTIVITIES OF DAILY LIVING (ADL)
ADLS_ACUITY_SCORE: 37

## 2022-05-26 NOTE — UTILIZATION REVIEW
"  Admission Status; Secondary Review Determination         Under the authority of the Utilization Management Committee, the utilization review process indicated a secondary review on the above patient.  The review outcome is based on review of the medical records, discussions with staff, and applying clinical experience noted on the date of the review.        (X)      Inpatient Status Appropriate - This patient's medical care is consistent with medical management for inpatient care and reasonable inpatient medical practice.      () Observation Status Appropriate - This patient does not meet hospital inpatient criteria and is placed in observation status. If this patient's primary payer is Medicare and was admitted as an inpatient, Condition Code 44 should be used and patient status changed to \"observation\".   () Admission Status NOT Appropriate - This patient's medical care is not consistent with medical management for Inpatient or Observation Status.          RATIONALE FOR DETERMINATION     54 year old male with a past medical history of HIV/AIDS on Biktarvy, poorly controlled diabetes mellitus type 2, GERD, CNS toxoplasmosis, hyperlipidemia, past COVID-19 infection (01/12/2022), s/p appendectomy, ex lap w/ lysis of adhesions (2016, 2018), recurrent SBOs with several recent admissions for recurrent SBOs, managed conservatively admitted 5/25/22 with recurrent abdominal pain and CT findings consistent with partial SBO. Patient was made NPO and given IV fluids.  He has required recurrent parenteral pain and nausea medications.    The severity of illness, intensity of service provided, expected LOS and risk for adverse outcome make the care complex, high risk and appropriate for hospital admission.        The information on this document is developed by the utilization review team in order for the business office to ensure compliance.  This only denotes the appropriateness of proper admission status and does not " reflect the quality of care rendered.         The definitions of Inpatient Status and Observation Status used in making the determination above are those provided in the CMS Coverage Manual, Chapter 1 and Chapter 6, section 70.4.      Sincerely,     Aguila Justin MD  Physician Advisor  Utilization Review/ Case Management  [facility].

## 2022-05-26 NOTE — PROGRESS NOTES
Surgery Progress Note  05/26/2022       Subjective:  - NAEO  - continued to decline NG yesterday  - endorses hunger  - endorses continued abdominal pain  - Endorses BM x1 and flatus   - states that he feels much better tdoay     Objective:  Temp:  [98.2  F (36.8  C)-98.5  F (36.9  C)] 98.5  F (36.9  C)  Pulse:  [76-84] 78  Resp:  [16-18] 16  BP: (104-149)/(71-98) 104/71  SpO2:  [93 %-100 %] 99 %    No intake/output data recorded.      Gen: Awake, alert, NAD  Resp: NLB on RA  Abd: soft, mildly distended, minimally tender  Ext: WWP, no edema     Labs:  Recent Labs   Lab 05/25/22  0141 05/24/22  1051 05/20/22  1506   WBC 8.8 7.2 9.2   HGB 14.0 12.7* 15.4    306 399       Recent Labs   Lab 05/26/22  0408 05/26/22  0041 05/25/22  1813 05/25/22  1534 05/25/22  1223 05/25/22  0957 05/25/22  0736 05/25/22  0141 05/24/22  1051 05/20/22  1506   NA  --   --   --   --   --   --   --  140 139 140   POTASSIUM  --   --   --  3.6  --   --   --  3.2* 3.5 4.3   CHLORIDE  --   --   --   --   --   --   --  102 106 110*   CO2  --   --   --   --   --   --   --  30 26 19*   BUN  --   --   --   --   --   --   --  17 14 22   CR  --   --   --   --   --   --   --  0.66 0.65* 0.87   * 109* 126*  --    < >  --    < > 166* 198* 142*   LINDA  --   --   --   --   --   --   --  10.1 9.5 9.4   MAG  --   --   --   --   --  1.9  --  1.9  --   --    PHOS  --   --   --   --   --  3.5  --  2.5  --   --     < > = values in this interval not displayed.       Imaging:  No new imaging to review      Assessment/Plan:   Andrew Lockwood is a 54 year old male with pmhx significant for  HIV/AIDS on Biktarvy, diabetes mellitus type 2, GERD, CNS toxoplasmosis, hyperlipidemia, prior COVID-19 infection (01/12/2022), current methamphetamine use, and recurrent SBOs with surgical history significant for distant appendicectomy as well as ex lap w/ lysis of adhesions (2016, 2018), who presents with signs/sx of SBO since yesterday evening, likely d/t adhesive  disease. Appears to be improving has he has evidence of ROBF and is hungry. Continues to refuse NG tube. As the patient has evidence of ROBF and improved abdominal pain would recommend advancing to CLD today. If he tolerates this, would recommend ADAT and discharging if tolerating a regular diet. General surgery to sign-off as his SBO has seemed to resolved. If he does not tolerate a diet, then would recommend a NG tube placement to LIS and reaching out to general surgery at that time.    - recommend advancing to CLD; if tolerating CLD would recommend ADAT  - surgery to sign-off; please reach out with any questions      Seen, examined, and discussed with chief resident, who will discuss with staff.  - - - - - - - - - - - - - - - - - -  Quincy Mchugh MD  Surgery Resident

## 2022-05-26 NOTE — PLAN OF CARE
Taken over care pt patient at midnight. Pt A&Ox4, PRN dilaudid given for abdominal pain. Pt sleeping throughout the night. NPO overnight, pt continues to ask for food. LR @ 125. No BM overnight. Urinal void. On RA. Will continue to monitor.    Problem: Plan of Care - These are the overarching goals to be used throughout the patient stay.    Goal: Absence of Hospital-Acquired Illness or Injury  Intervention: Identify and Manage Fall Risk  Recent Flowsheet Documentation  Taken 5/26/2022 0043 by Daniela Navarro RN  Safety Promotion/Fall Prevention:    assistive device/personal items within reach    safety round/check completed    room organization consistent    nonskid shoes/slippers when out of bed   Goal Outcome Evaluation:    Plan of Care Reviewed With: patient     Overall Patient Progress: no change

## 2022-05-26 NOTE — PROGRESS NOTES
[facility]  SERIAL ABDOMINAL EXAM     Patient sleeping upon writer's arrival. Woke up, said he is hungry and is also having some abdominal pain. Notes last pain med was over 12 hours ago. Asking for a diet.        /76   Pulse 78   Temp 98.3  F (36.8  C) (Oral)   Resp 16   SpO2 97%     General: patient sleeping comfortably. Awakens with exam, in NAD  Abdomen: UNCHANGED - soft, mildly distended, mild tenderness in lower quadrants     A/P:  No concern for evolving SBO/ischemia. Continue current plan of care. Day team can decide whether to advance diet in a few hours. Patient was agreeable with this plan.     Anabel Jenkins MD  [facility], PGY-1

## 2022-05-26 NOTE — PROGRESS NOTES
[facility]  SERIAL ABDOMINAL EXAM    Patient sleeping upon writer's arrival. Woke up, said he has been passing gas and has had a bowel movement since admission. No new concerns, pain controlled.    BP (!) 149/98   Pulse 82   Temp 98.2  F (36.8  C)   Resp (P) 16   SpO2 100%     General: patient sleeping comfortably. Awakens with exam, in NAD  Abdomen: soft, mildly distended, BS+, mild tenderness in lower quadrants    A/P  No concern for evolving SBO/ischemia. Continue current plan of care    Anabel Jenkins MD  [facility], PGY-1

## 2022-05-26 NOTE — PROGRESS NOTES
[facility]    Progress Note - \[facility] [place] [facility]\"" Family Medicine Service       Date of Admission:  5/25/2022    Plans for today:  - advance to low residue diet   - ensure adequate pain control   - start Wellbutrin per addiction medicine    Assessment & Plan        Andrew Lockwood is a 54 year old male with a past medical history of HIV/AIDS on Biktarvy, poorly controlled diabetes mellitus type 2, GERD, CNS toxoplasmosis, hyperlipidemia, past COVID-19 infection (01/12/2022), s/p appendectomy, ex lap w/ lysis of adhesions (2016, 2018), and recurrent SBOs admitted 5/25/22 with recurrent abdominal pain and CT findings consistent with partial SBO.      # Recurrent SBO  # Abdominal pain  # Nausea/vomiting  55 y/o w/ hx recurrent SBO with many prior hospitalizations, presents 5/25 early AM with acute onset abdominal pain that started on 5/24. In ED, CT showed pSBO and exam consistent with SBO given diffuse abdominal tenderness and distension. Pt declined NG in ED, which he historically does. Anticipate spontaneous resolution with conservative management; pain improves with IV dilaudid. Will continue to monitor closely with serial abdominal exams.     - Declined NG  - Pain: acetaminophen PRN, IV dilaudid 0.3-0.5 mg q2h PRN  - General surgery consulted, appreciate recs  - serial abdominal exams - q6hrs  - mIVF LR @ 125mL  - advanced diet to low residue diet  - can consider gastrografin study if persistent abdominal pain on 5/27  - Daily BMP, Mg, phos  - Nausea: zofran + compazine     # Poorly controlled type II DM  Last A1c 11.2 in 02/2022. Home meds include jardiance 25 mg, metformin 500mg daily - reports adherence. Not taking insulin though has previously been prescribed. Pt concerned metformin causing diarrhea.   - MISS q4h while NPO  - Will monitor blood sugars and consider restarting prior Levemir at reduced dose of 10 u BID if high  - hypoglycemia protocol     # Hypokalemia,  resolved  Potassium 3.2 on admission. Replaced in ED.  - Standard replacement protocol   - Monitor with daily BMP     # Episodes of loose stools  # Possible hx of SIBO  Multiple references to SIBO in patient's chart dating back several years based on patient's sx of recurrent diarrhea and bloating; has not had carbohydrate breath test for official dx. During last hospitalizations in March 2022, patient was placed on BID ciprofloxacin for possible SIBO. Course was supposed to be 14 days, but patient compliance outside of hospital is unclear. Patient is endorsing large volume loose stools yesterday and today, which could be from SIBO or possible C.Diff given recent abx course. Consider C. Diff testing if loose stools persist.      # Substance use disorder  # History of housing instability  Several years' hx meth use. Smokes; does not inject. Last use 5/24. Interested in detox. Has had interval sobriety over the last few years. No other substance use concerns, including EtOH. Living at [facility], currently working to get independent housing. Was working to get his ID In the days prior to admission. Declines talking to SW with [facility]; says he has more resources through [employer] [facility] [employer].   - Addiction medicine consulted, appreciate recs:   - naltrexone 50mg when off opioids   - wellbutrin xr 150mg daily, will discuss with addiction medicine about titrating up to 450mg   - referral to outpatient addiction medicine follow up placed to help set up injectable naltrexone   - peer support  to meet on Thursday      Chronic/Stable:     #HIV  Stable on Biktarvy, which patient has brought with him. Last CD4 count 5/24 was 526.  - PTA Biktarvy     #GERD  - PTA protonix 40 daily     Diet: Low Fiber Diet    DVT Prophylaxis: Pneumatic Compression Devices  Potts Catheter: Not present  Fluids: LR @ 125mL  Central Lines: None  Cardiac Monitoring: None  Code Status: Full Code      Disposition Plan   Expected Discharge: 05/26/2022     Anticipated  discharge location:  Awaiting care coordination huddle  Delays: optimal pain control       The patient's care was discussed with the Attending Physician, Dr. Key.    Michel Brown, MS4    Resident/Fellow Attestation   I, Romi Palma MD, was present with the medical/KASSY student who participated in the service and in the documentation of the note.  I have verified the history and personally performed the physical exam and medical decision making.  I agree with the assessment and plan of care as documented in the note.      Romi Palma MD  PGY1  Date of Service (when I saw the patient): 05/26/22    [facility]ospitals Family [facility] Service  [facility]  Securely message with the Vocera Web Console (learn more here)  Text page via [facility] Paging/Directory   Please see signed in provider for up to date coverage information  ______________________________________________________________________    Interval History   Abdominal pain 9/10 this morning and no longer passing gas, though he does report a BM overnight. Still feeling very sleepy. Has been tolerating clear liquid diet without nausea or emesis; feels hungry and interested in trying to further advance diet.    Data reviewed today: I reviewed all medications, new labs and imaging results over the last 24 hours.    Physical Exam   Vital Signs: Temp: 98.5  F (36.9  C) Temp src: Oral BP: 111/73 Pulse: 81   Resp: 16 SpO2: 100 % O2 Device: None (Room air)    Weight: 0 lbs 0 oz     Physical Exam  Vitals reviewed.   Constitutional:       Appearance: Normal appearance.   HENT:      Head: Normocephalic.   Eyes:      Conjunctiva/sclera: Conjunctivae normal.   Cardiovascular:      Rate and Rhythm: Normal rate and regular rhythm.      Heart sounds: Normal heart sounds.   Pulmonary:      Effort: Pulmonary effort is normal.      Breath sounds: Normal breath sounds.   Abdominal:      General: Bowel sounds are normal. There is  distension.      Tenderness: There is abdominal tenderness (diffuse). There is no guarding or rebound.   Musculoskeletal:         General: No swelling.   Skin:     General: Skin is warm and dry.   Neurological:      Mental Status: He is alert.   Psychiatric:         Mood and Affect: Mood normal.         Behavior: Behavior normal.         Thought Content: Thought content normal.         Judgment: Judgment normal.           Data

## 2022-05-27 ENCOUNTER — APPOINTMENT (OUTPATIENT)
Dept: GENERAL RADIOLOGY | Facility: CLINIC | Age: 57
End: 2022-05-27
Payer: COMMERCIAL

## 2022-05-27 LAB
ANION GAP SERPL CALCULATED.3IONS-SCNC: 7 MMOL/L (ref 3–14)
BUN SERPL-MCNC: 8 MG/DL (ref 7–30)
CALCIUM SERPL-MCNC: 8.2 MG/DL (ref 8.5–10.1)
CHLORIDE BLD-SCNC: 107 MMOL/L (ref 94–109)
CO2 SERPL-SCNC: 27 MMOL/L (ref 20–32)
CREAT SERPL-MCNC: 0.72 MG/DL (ref 0.66–1.25)
ERYTHROCYTE [DISTWIDTH] IN BLOOD BY AUTOMATED COUNT: 14.2 % (ref 10–15)
GFR SERPL CREATININE-BSD FRML MDRD: >90 ML/MIN/1.73M2
GLUCOSE BLD-MCNC: 196 MG/DL (ref 70–99)
GLUCOSE BLDC GLUCOMTR-MCNC: 110 MG/DL (ref 70–99)
GLUCOSE BLDC GLUCOMTR-MCNC: 121 MG/DL (ref 70–99)
GLUCOSE BLDC GLUCOMTR-MCNC: 124 MG/DL (ref 70–99)
GLUCOSE BLDC GLUCOMTR-MCNC: 162 MG/DL (ref 70–99)
GLUCOSE BLDC GLUCOMTR-MCNC: 190 MG/DL (ref 70–99)
GLUCOSE BLDC GLUCOMTR-MCNC: 215 MG/DL (ref 70–99)
HCT VFR BLD AUTO: 35.1 % (ref 40–53)
HGB BLD-MCNC: 11.1 G/DL (ref 13.3–17.7)
LACTATE SERPL-SCNC: 1.2 MMOL/L (ref 0.7–2)
MAGNESIUM SERPL-MCNC: 2.1 MG/DL (ref 1.6–2.3)
MCH RBC QN AUTO: 26.7 PG (ref 26.5–33)
MCHC RBC AUTO-ENTMCNC: 31.6 G/DL (ref 31.5–36.5)
MCV RBC AUTO: 85 FL (ref 78–100)
PHOSPHATE SERPL-MCNC: 2.8 MG/DL (ref 2.5–4.5)
PLATELET # BLD AUTO: 282 10E3/UL (ref 150–450)
POTASSIUM BLD-SCNC: 3.2 MMOL/L (ref 3.4–5.3)
POTASSIUM BLD-SCNC: 3.3 MMOL/L (ref 3.4–5.3)
RBC # BLD AUTO: 4.15 10E6/UL (ref 4.4–5.9)
SODIUM SERPL-SCNC: 141 MMOL/L (ref 133–144)
WBC # BLD AUTO: 5.3 10E3/UL (ref 4–11)

## 2022-05-27 PROCEDURE — 250N000011 HC RX IP 250 OP 636: Performed by: STUDENT IN AN ORGANIZED HEALTH CARE EDUCATION/TRAINING PROGRAM

## 2022-05-27 PROCEDURE — 80048 BASIC METABOLIC PNL TOTAL CA: CPT | Performed by: STUDENT IN AN ORGANIZED HEALTH CARE EDUCATION/TRAINING PROGRAM

## 2022-05-27 PROCEDURE — 36415 COLL VENOUS BLD VENIPUNCTURE: CPT | Performed by: STUDENT IN AN ORGANIZED HEALTH CARE EDUCATION/TRAINING PROGRAM

## 2022-05-27 PROCEDURE — 83605 ASSAY OF LACTIC ACID: CPT | Performed by: STUDENT IN AN ORGANIZED HEALTH CARE EDUCATION/TRAINING PROGRAM

## 2022-05-27 PROCEDURE — 74018 RADEX ABDOMEN 1 VIEW: CPT

## 2022-05-27 PROCEDURE — 74018 RADEX ABDOMEN 1 VIEW: CPT | Mod: 26 | Performed by: RADIOLOGY

## 2022-05-27 PROCEDURE — 84132 ASSAY OF SERUM POTASSIUM: CPT | Performed by: STUDENT IN AN ORGANIZED HEALTH CARE EDUCATION/TRAINING PROGRAM

## 2022-05-27 PROCEDURE — 83735 ASSAY OF MAGNESIUM: CPT | Performed by: STUDENT IN AN ORGANIZED HEALTH CARE EDUCATION/TRAINING PROGRAM

## 2022-05-27 PROCEDURE — 250N000013 HC RX MED GY IP 250 OP 250 PS 637: Performed by: STUDENT IN AN ORGANIZED HEALTH CARE EDUCATION/TRAINING PROGRAM

## 2022-05-27 PROCEDURE — 85027 COMPLETE CBC AUTOMATED: CPT | Performed by: STUDENT IN AN ORGANIZED HEALTH CARE EDUCATION/TRAINING PROGRAM

## 2022-05-27 PROCEDURE — 84100 ASSAY OF PHOSPHORUS: CPT | Performed by: STUDENT IN AN ORGANIZED HEALTH CARE EDUCATION/TRAINING PROGRAM

## 2022-05-27 PROCEDURE — 99233 SBSQ HOSP IP/OBS HIGH 50: CPT | Mod: GC | Performed by: FAMILY MEDICINE

## 2022-05-27 PROCEDURE — 96376 TX/PRO/DX INJ SAME DRUG ADON: CPT | Performed by: EMERGENCY MEDICINE

## 2022-05-27 PROCEDURE — 120N000002 HC R&B MED SURG/OB UMMC

## 2022-05-27 RX ORDER — HYDROMORPHONE HYDROCHLORIDE 1 MG/ML
.3-.5 INJECTION, SOLUTION INTRAMUSCULAR; INTRAVENOUS; SUBCUTANEOUS
Status: COMPLETED | OUTPATIENT
Start: 2022-05-27 | End: 2022-05-27

## 2022-05-27 RX ORDER — OXYCODONE HYDROCHLORIDE 5 MG/1
5 TABLET ORAL EVERY 6 HOURS PRN
Status: DISCONTINUED | OUTPATIENT
Start: 2022-05-27 | End: 2022-05-28

## 2022-05-27 RX ORDER — HYDROMORPHONE HYDROCHLORIDE 1 MG/ML
0.5 INJECTION, SOLUTION INTRAMUSCULAR; INTRAVENOUS; SUBCUTANEOUS
Status: DISCONTINUED | OUTPATIENT
Start: 2022-05-27 | End: 2022-05-29 | Stop reason: HOSPADM

## 2022-05-27 RX ADMIN — INSULIN ASPART 1 UNITS: 100 INJECTION, SOLUTION INTRAVENOUS; SUBCUTANEOUS at 06:33

## 2022-05-27 RX ADMIN — HYDROMORPHONE HYDROCHLORIDE 0.5 MG: 1 INJECTION, SOLUTION INTRAMUSCULAR; INTRAVENOUS; SUBCUTANEOUS at 14:08

## 2022-05-27 RX ADMIN — INSULIN ASPART 2 UNITS: 100 INJECTION, SOLUTION INTRAVENOUS; SUBCUTANEOUS at 19:52

## 2022-05-27 RX ADMIN — DIATRIZOATE MEGLUMINE AND DIATRIZOATE SODIUM 75 ML: 660; 100 SOLUTION ORAL; RECTAL at 10:28

## 2022-05-27 RX ADMIN — HYDROMORPHONE HYDROCHLORIDE 0.5 MG: 1 INJECTION, SOLUTION INTRAMUSCULAR; INTRAVENOUS; SUBCUTANEOUS at 08:41

## 2022-05-27 RX ADMIN — HYDROMORPHONE HYDROCHLORIDE 0.5 MG: 1 INJECTION, SOLUTION INTRAMUSCULAR; INTRAVENOUS; SUBCUTANEOUS at 06:36

## 2022-05-27 RX ADMIN — BICTEGRAVIR SODIUM, EMTRICITABINE, AND TENOFOVIR ALAFENAMIDE FUMARATE 1 TABLET: 50; 200; 25 TABLET ORAL at 08:42

## 2022-05-27 RX ADMIN — PANTOPRAZOLE SODIUM 40 MG: 40 TABLET, DELAYED RELEASE ORAL at 08:42

## 2022-05-27 RX ADMIN — DOCUSATE SODIUM 50 MG AND SENNOSIDES 8.6 MG 1 TABLET: 8.6; 5 TABLET, FILM COATED ORAL at 08:42

## 2022-05-27 RX ADMIN — HYDROMORPHONE HYDROCHLORIDE 0.5 MG: 1 INJECTION, SOLUTION INTRAMUSCULAR; INTRAVENOUS; SUBCUTANEOUS at 01:40

## 2022-05-27 ASSESSMENT — ACTIVITIES OF DAILY LIVING (ADL)
ADLS_ACUITY_SCORE: 37

## 2022-05-27 NOTE — PROGRESS NOTES
[facility]    Progress Note - [facility] Medicine Service       Date of Admission:  5/25/2022    Main Plans for Today   - discontinue mIVF fluids  - fiber supplement  - restart home DM regimen: metformin and jardiance  - discontinue q4h BS checks  - start BS checks with meals  - continue MSSI    Assessment & Plan                Andrew Lockwood is a 54 year old male with a past medical history of HIV/AIDS on Biktarvy, poorly controlled diabetes mellitus type 2, GERD, CNS toxoplasmosis, hyperlipidemia, past COVID-19 infection (01/12/2022), s/p appendectomy, ex lap w/ lysis of adhesions (2016, 2018), and recurrent SBOs admitted 5/25/22 with recurrent abdominal pain and CT findings consistent with partial SBO.      # Recurrent SBO, resolved  # Abdominal pain  # Nausea/vomiting  53 y/o w/ hx recurrent SBO with many prior hospitalizations, presents 5/25 early AM with acute onset abdominal pain that started on 5/24. In ED, CT showed pSBO and exam consistent with SBO given diffuse abdominal tenderness and distension. Pt declined NG in ED, which he historically does. Gastrografin study on 5/27 showed resolved obstruction. Pain also improving, now using PO medications and advancing diet prior to discharge.  - Declined NG repeatedly   - Pain: acetaminophen PRN, PO oxycodone 5mg q6hprn   - mIVF LR @ 125mL  - advanced diet to low residue diet  - Daily BMP, Mg, phos  - Nausea: zofran + compazine  - General surgery signed off     # Poorly controlled type II DM  Last A1c 11.2 in 02/2022. Home meds include jardiance 25 mg, metformin 500mg daily - reports adherence. Not taking insulin though has previously been prescribed. Pt concerned metformin causing diarrhea.   - MISS with sliding scale with return to diet  - Restart home regimen of jardiance 25 mg and metformin 500 mg daily  - Will monitor blood sugars  - Hypoglycemia protocol    # Hypokalemia, recurrent  Potassium 3.2 on  admission. Replaced in ED.  - Standard replacement protocol   - Monitor with daily BMP     # Episodes of loose stools  # Possible hx of SIBO  Multiple references to SIBO in patient's chart dating back several years based on patient's sx of recurrent diarrhea and bloating; has not had carbohydrate breath test for official dx. During last hospitalizations in March 2022, patient was placed on BID ciprofloxacin for possible SIBO. Course was supposed to be 14 days, but patient compliance outside of hospital is unclear. Patient is endorsing large volume loose stools yesterday and today, which could be from SIBO or possible C.Diff given recent abx course. Consider C. Diff testing if loose stools persist.      # Substance use disorder  # History of housing instability  Several years' hx meth use. Smokes; does not inject. Last use 5/24. Interested in detox. Has had interval sobriety over the last few years. No other substance use concerns, including EtOH. Living at [facility], currently working to get independent housing. Was working to get his ID in the days prior to admission. Declines talking to SW with [facility]; says he has more resources through [employer]. Though agreeable to treatment facility earlier in this admission, he is no longer interested in treatment when discharged. Declines Wellbutrin and naltrexone as well.   - Addiction medicine consulted, appreciate recs:              - naltrexone 50mg when off opioids              - continue wellbutrin xr 300mg daily              - referral to outpatient addiction medicine follow up placed to help set up injectable naltrexone              - peer support  to meet in hospital and follow up outpatient   - Patient declining naltrexone, Wellbutrin, and further treatment for substance use     Chronic/Stable:    #HIV  Stable on Biktarvy, which patient has brought with him. Last CD4 count 5/24 was 526.  - PTA Biktarvy     #GERD  - PTA protonix 40 daily     Diet: Low Fiber Diet    DVT  Prophylaxis: Pneumatic Compression Devices  Potts Catheter: Not present  Fluids: LR @ 125 mL  Central Lines: None  Cardiac Monitoring: None  Code Status: Full Code      Disposition Plan   Expected Discharge: 05/28/2022  Anticipated discharge location:  Awaiting care coordination huddle  Delays: optimal pain control and adequate PO intake     The patient's care was discussed with the Attending Physician, Dr. Key.    Michel Brown, MS4    Rianna Patel MD  [facility] Service  [facility]  Securely message with the Vocera Web Console (learn more here)  Text page via [facility] Paging/Directory   Please see signed in provider for up to date coverage information  _______________________________    Interval History   No acute events overnight. Patient reports his pain has not changed at all since yesterday, describing a crampy, sharp pain across epigastric region. Pain improves with meds but then returns. Does not want to try more solid foods as he got reflux and worsened pain after eating a sandwich yesterday. Reports no BM since 2 nights ago. Reports that he is not passing gas.     Is open to trying foods late AM, would like crackers and sandwich.     Data reviewed today: I reviewed all medications, new labs and imaging results over the last 24 hours.    Physical Exam   Vital Signs: Temp: 97.8  F (36.6  C) Temp src: Oral BP: 116/68 Pulse: 78   Resp: 16 SpO2: 98 % O2 Device: None (Room air)    Weight: 161 lbs 12.8 oz  Constitutional: awake, alert, appears mildly uncomfortable  Eyes: pupils equal, extra ocular muscles grossly intact, sclera clear, conjunctiva normal  ENT: normocepalic, without obvious abnormality  Respiratory: No increased work of breathing, good air exchange, clear to auscultation bilaterally, no crackles or wheezing  Cardiovascular: Well perfused, regular rate  GI: hypoactive bowel sounds, distended (improved from prior), tenderness noted diffusely  and in the epigastric region, similar to yesterday. No masses palpated  Skin: no bruising or bleeding, no redness, warmth, or swelling, no rashes and no jaundice  Neuropsychiatric:   Level of consciousness: alert / normal (improved from previous days)  Orientation: oriented to self, place, time and situation    Data   Recent Labs   Lab 05/28/22  1134 05/28/22  0739 05/28/22  0718 05/27/22  1145 05/27/22  0936 05/27/22  0632 05/27/22  0610 05/26/22  0906 05/26/22  0710 05/26/22  0709 05/25/22  0736 05/25/22  0141 05/24/22  1051   WBC  --   --  5.8  --   --   --  5.3  --  6.7  --   --  8.8 7.2   HGB  --   --  11.3*  --   --   --  11.1*  --  11.7*  --   --  14.0 12.7*   MCV  --   --  83  --   --   --  85  --  84  --   --  81 84   PLT  --   --  287  --   --   --  282  --  287  --   --  345 306   NA  --   --  140  --   --   --  141  --   --  139  --  140 139   POTASSIUM  --   --  3.3*  --  3.3*  --  3.2*  --   --  3.4   < > 3.2* 3.5   CHLORIDE  --   --  108  --   --   --  107  --   --  109  --  102 106   CO2  --   --  27  --   --   --  27  --   --  22  --  30 26   BUN  --   --  10  --   --   --  8  --   --  10  --  17 14   CR  --   --  0.64*  --   --   --  0.72  --   --  0.68  --  0.66 0.65*   ANIONGAP  --   --  5  --   --   --  7  --   --  8  --  8 7   LINDA  --   --  8.6  --   --   --  8.2*  --   --  8.6  --  10.1 9.5   * 204* 180*   < >  --    < > 196*   < >  --  94   < > 166* 198*   ALBUMIN  --   --   --   --   --   --   --   --   --   --   --  4.0 3.6   PROTTOTAL  --   --   --   --   --   --   --   --   --   --   --  8.0 7.2   BILITOTAL  --   --   --   --   --   --   --   --   --   --   --  0.7 0.5   ALKPHOS  --   --   --   --   --   --   --   --   --   --   --  98 99   ALT  --   --   --   --   --   --   --   --   --   --   --  21 16   AST  --   --   --   --   --   --   --   --   --   --   --  17 16   LIPASE  --   --   --   --   --   --   --   --   --   --   --  144  --     < > = values in this interval not  displayed.

## 2022-05-27 NOTE — PROGRESS NOTES
[facility]    Progress Note - [facility] Family Medicine Service       Date of Admission:  5/25/2022    Assessment & Plan        Plans for today:  - transition to oral pain meds  - gastrografin challenge (6PM imaging)  - increase Wellbutrin dose to 300mg      Assessment & Plan          Andrew Lockwood is a 54 year old male with a past medical history of HIV/AIDS on Biktarvy, poorly controlled diabetes mellitus type 2, GERD, CNS toxoplasmosis, hyperlipidemia, past COVID-19 infection (01/12/2022), s/p appendectomy, ex lap w/ lysis of adhesions (2016, 2018), and recurrent SBOs admitted 5/25/22 with recurrent abdominal pain and CT findings consistent with partial SBO.      # Recurrent SBO  # Abdominal pain  # Nausea/vomiting  55 y/o w/ hx recurrent SBO with many prior hospitalizations, presents 5/25 early AM with acute onset abdominal pain that started on 5/24. In ED, CT showed pSBO and exam consistent with SBO given diffuse abdominal tenderness and distension. Pt declined NG in ED, which he historically does. Anticipate spontaneous resolution with conservative management; pain improves with IV dilaudid. Will continue to monitor closely with serial abdominal exams.     - Declined NG repeatedly   - Pain: acetaminophen PRN, IV dilaudid 0.3-0.5 mg q2h PRN (3 dose limit today) + PO oxycodone 5mg q6hprn   - serial abdominal exams - q6hrs  - mIVF LR @ 125mL  - advanced diet to low residue diet  - Daily BMP, Mg, phos  - Nausea: zofran + compazine  - gastrografin study 5/27 pending   - general surgery signed off, will re-consult if SBO is unresolved on gastrografin study      # Poorly controlled type II DM  Last A1c 11.2 in 02/2022. Home meds include jardiance 25 mg, metformin 500mg daily - reports adherence. Not taking insulin though has previously been prescribed. Pt concerned metformin causing diarrhea.   - MISS q4h while NPO  - Will monitor blood sugars and consider  restarting prior Levemir at reduced dose of 10 u BID if high  - hypoglycemia protocol     # Hypokalemia, recurrent  Potassium 3.2 on admission. Replaced in ED.  - Standard replacement protocol   - Monitor with daily BMP     # Episodes of loose stools  # Possible hx of SIBO  Multiple references to SIBO in patient's chart dating back several years based on patient's sx of recurrent diarrhea and bloating; has not had carbohydrate breath test for official dx. During last hospitalizations in March 2022, patient was placed on BID ciprofloxacin for possible SIBO. Course was supposed to be 14 days, but patient compliance outside of hospital is unclear. Patient is endorsing large volume loose stools yesterday and today, which could be from SIBO or possible C.Diff given recent abx course. Consider C. Diff testing if loose stools persist.      # Substance use disorder  # History of housing instability  Several years' hx meth use. Smokes; does not inject. Last use 5/24. Interested in detox. Has had interval sobriety over the last few years. No other substance use concerns, including EtOH. Living at [facility], currently working to get independent housing. Was working to get his ID In the days prior to admission. Declines talking to SW with [facility]; says he has more resources through [employer].   - Addiction medicine consulted, appreciate recs:              - naltrexone 50mg when off opioids              - increase wellbutrin xr to 300mg daily              - referral to outpatient addiction medicine follow up placed to help set up injectable naltrexone              - peer support  to meet in hospital and follow up outpatient      Chronic/Stable:     #HIV  Stable on Biktarvy, which patient has brought with him. Last CD4 count 5/24 was 526.  - PTA Biktarvy     #GERD  - PTA protonix 40 daily     Diet: Low Fiber Diet    DVT Prophylaxis: Pneumatic Compression Devices  Potts Catheter: Not present  Fluids: LR @ 125 mL  Central Lines:  None  Cardiac Monitoring: None  Code Status: Full Code      Disposition Plan   Expected Discharge: 05/28/2022  Anticipated discharge location:  Awaiting care coordination huddle  Delays: optimal pain control     The patient's care was discussed with the Attending Physician, Dr. Key.    Michel Brown, MS4    Resident/Fellow Attestation   I, Romi Palma MD, was present with the medical/KASSY student who participated in the service and in the documentation of the note.  I have verified the history and personally performed the physical exam and medical decision making.  I agree with the assessment and plan of care as documented in the note.      Romi Palma MD  PGY1  Date of Service (when I saw the patient): 05/27/22    [facility] Medicine Service  [facility]  Securely message with the Vocera Web Console (learn more here)  Text page via [facility] Paging/Directory   Please see signed in provider for up to date coverage information  _______________________________    Interval History   No acute events overnight. Patient reports his pain has not changed at all since yesterday, describing a crampy, sharp pain across epigastric region. Pain improves with meds but then returns. Does not want to try more solid foods as he got reflux and worsened pain after eating a sandwich yesterday. Reports no BM since 2 nights ago. Reports that he is not passing gas.     Data reviewed today: I reviewed all medications, new labs and imaging results over the last 24 hours.    Physical Exam   Vital Signs: Temp: 98.2  F (36.8  C) Temp src: Oral BP: 110/83 Pulse: 89   Resp: 16 SpO2: 97 % O2 Device: None (Room air)    Weight: 0 lbs 0 oz  Constitutional: awake, alert, appears mildly uncomfortable  Eyes: pupils equal, extra ocular muscles grossly intact, sclera clear, conjunctiva normal  ENT: normocepalic, without obvious abnormality  Respiratory: No increased work of breathing, good air  exchange, clear to auscultation bilaterally, no crackles or wheezing  Cardiovascular: Well perfused, regular rate  GI: hypoactive bowel sounds, distended (improved from prior), tenderness noted diffusely and in the epigastric region, similar to yesterday. No masses palpated  Skin: no bruising or bleeding, no redness, warmth, or swelling, no rashes and no jaundice  Neuropsychiatric:   Level of consciousness: alert / normal (improved from previous days)  Orientation: oriented to self, place, time and situation    Data   Recent Labs   Lab 05/27/22  0632 05/27/22  0610 05/26/22  1944 05/26/22  0906 05/26/22  0710 05/26/22  0709 05/25/22  1813 05/25/22  1534 05/25/22  0736 05/25/22  0141 05/24/22  1051 05/20/22  1506   WBC  --  5.3  --   --  6.7  --   --   --   --  8.8 7.2 9.2   HGB  --  11.1*  --   --  11.7*  --   --   --   --  14.0 12.7* 15.4   MCV  --  85  --   --  84  --   --   --   --  81 84 85   PLT  --  282  --   --  287  --   --   --   --  345 306 399   NA  --  141  --   --   --  139  --   --   --  140 139 140   POTASSIUM  --  3.2*  --   --   --  3.4  --  3.6  --  3.2* 3.5 4.3   CHLORIDE  --  107  --   --   --  109  --   --   --  102 106 110*   CO2  --  27  --   --   --  22  --   --   --  30 26 19*   BUN  --  8  --   --   --  10  --   --   --  17 14 22   CR  --  0.72  --   --   --  0.68  --   --   --  0.66 0.65* 0.87   ANIONGAP  --  7  --   --   --  8  --   --   --  8 7 11   LINDA  --  8.2*  --   --   --  8.6  --   --   --  10.1 9.5 9.4   * 196* 225*   < >  --  94   < >  --    < > 166* 198* 142*   ALBUMIN  --   --   --   --   --   --   --   --   --  4.0 3.6 4.1   PROTTOTAL  --   --   --   --   --   --   --   --   --  8.0 7.2 8.7   BILITOTAL  --   --   --   --   --   --   --   --   --  0.7 0.5 0.6   ALKPHOS  --   --   --   --   --   --   --   --   --  98 99 106   ALT  --   --   --   --   --   --   --   --   --  21 16 20   AST  --   --   --   --   --   --   --   --   --  17 16 10   LIPASE  --   --   --   --    --   --   --   --   --  144  --  126    < > = values in this interval not displayed.

## 2022-05-28 PROBLEM — E87.6 HYPOKALEMIA: Status: ACTIVE | Noted: 2021-12-25

## 2022-05-28 LAB
ANION GAP SERPL CALCULATED.3IONS-SCNC: 5 MMOL/L (ref 3–14)
BUN SERPL-MCNC: 10 MG/DL (ref 7–30)
CALCIUM SERPL-MCNC: 8.6 MG/DL (ref 8.5–10.1)
CHLORIDE BLD-SCNC: 108 MMOL/L (ref 94–109)
CO2 SERPL-SCNC: 27 MMOL/L (ref 20–32)
CREAT SERPL-MCNC: 0.64 MG/DL (ref 0.66–1.25)
ERYTHROCYTE [DISTWIDTH] IN BLOOD BY AUTOMATED COUNT: 13.9 % (ref 10–15)
GFR SERPL CREATININE-BSD FRML MDRD: >90 ML/MIN/1.73M2
GLUCOSE BLD-MCNC: 180 MG/DL (ref 70–99)
GLUCOSE BLDC GLUCOMTR-MCNC: 123 MG/DL (ref 70–99)
GLUCOSE BLDC GLUCOMTR-MCNC: 175 MG/DL (ref 70–99)
GLUCOSE BLDC GLUCOMTR-MCNC: 204 MG/DL (ref 70–99)
GLUCOSE BLDC GLUCOMTR-MCNC: 217 MG/DL (ref 70–99)
GLUCOSE BLDC GLUCOMTR-MCNC: 319 MG/DL (ref 70–99)
GLUCOSE BLDC GLUCOMTR-MCNC: 331 MG/DL (ref 70–99)
HCT VFR BLD AUTO: 35.7 % (ref 40–53)
HGB BLD-MCNC: 11.3 G/DL (ref 13.3–17.7)
MAGNESIUM SERPL-MCNC: 1.9 MG/DL (ref 1.6–2.3)
MCH RBC QN AUTO: 26.2 PG (ref 26.5–33)
MCHC RBC AUTO-ENTMCNC: 31.7 G/DL (ref 31.5–36.5)
MCV RBC AUTO: 83 FL (ref 78–100)
PHOSPHATE SERPL-MCNC: 2.9 MG/DL (ref 2.5–4.5)
PLATELET # BLD AUTO: 287 10E3/UL (ref 150–450)
POTASSIUM BLD-SCNC: 3.3 MMOL/L (ref 3.4–5.3)
POTASSIUM BLD-SCNC: 3.7 MMOL/L (ref 3.4–5.3)
RBC # BLD AUTO: 4.31 10E6/UL (ref 4.4–5.9)
SODIUM SERPL-SCNC: 140 MMOL/L (ref 133–144)
WBC # BLD AUTO: 5.8 10E3/UL (ref 4–11)

## 2022-05-28 PROCEDURE — 83735 ASSAY OF MAGNESIUM: CPT | Performed by: STUDENT IN AN ORGANIZED HEALTH CARE EDUCATION/TRAINING PROGRAM

## 2022-05-28 PROCEDURE — 250N000013 HC RX MED GY IP 250 OP 250 PS 637: Performed by: FAMILY MEDICINE

## 2022-05-28 PROCEDURE — 250N000013 HC RX MED GY IP 250 OP 250 PS 637: Performed by: STUDENT IN AN ORGANIZED HEALTH CARE EDUCATION/TRAINING PROGRAM

## 2022-05-28 PROCEDURE — 85027 COMPLETE CBC AUTOMATED: CPT | Performed by: STUDENT IN AN ORGANIZED HEALTH CARE EDUCATION/TRAINING PROGRAM

## 2022-05-28 PROCEDURE — 36415 COLL VENOUS BLD VENIPUNCTURE: CPT | Performed by: FAMILY MEDICINE

## 2022-05-28 PROCEDURE — 84132 ASSAY OF SERUM POTASSIUM: CPT | Performed by: FAMILY MEDICINE

## 2022-05-28 PROCEDURE — 84100 ASSAY OF PHOSPHORUS: CPT | Performed by: STUDENT IN AN ORGANIZED HEALTH CARE EDUCATION/TRAINING PROGRAM

## 2022-05-28 PROCEDURE — 99233 SBSQ HOSP IP/OBS HIGH 50: CPT | Mod: GC | Performed by: FAMILY MEDICINE

## 2022-05-28 PROCEDURE — 36415 COLL VENOUS BLD VENIPUNCTURE: CPT | Performed by: STUDENT IN AN ORGANIZED HEALTH CARE EDUCATION/TRAINING PROGRAM

## 2022-05-28 PROCEDURE — 80048 BASIC METABOLIC PNL TOTAL CA: CPT | Performed by: STUDENT IN AN ORGANIZED HEALTH CARE EDUCATION/TRAINING PROGRAM

## 2022-05-28 PROCEDURE — 120N000002 HC R&B MED SURG/OB UMMC

## 2022-05-28 RX ORDER — NALOXONE HYDROCHLORIDE 0.4 MG/ML
0.4 INJECTION, SOLUTION INTRAMUSCULAR; INTRAVENOUS; SUBCUTANEOUS
Status: DISCONTINUED | OUTPATIENT
Start: 2022-05-28 | End: 2022-05-29 | Stop reason: HOSPADM

## 2022-05-28 RX ORDER — HYDROMORPHONE HYDROCHLORIDE 2 MG/1
2 TABLET ORAL
Status: DISCONTINUED | OUTPATIENT
Start: 2022-05-28 | End: 2022-05-29 | Stop reason: HOSPADM

## 2022-05-28 RX ORDER — NALOXONE HYDROCHLORIDE 0.4 MG/ML
0.2 INJECTION, SOLUTION INTRAMUSCULAR; INTRAVENOUS; SUBCUTANEOUS
Status: DISCONTINUED | OUTPATIENT
Start: 2022-05-28 | End: 2022-05-29 | Stop reason: HOSPADM

## 2022-05-28 RX ORDER — CALCIUM POLYCARBOPHIL 625 MG 625 MG/1
625 TABLET ORAL DAILY
Status: DISCONTINUED | OUTPATIENT
Start: 2022-05-28 | End: 2022-05-28

## 2022-05-28 RX ORDER — POTASSIUM CHLORIDE 750 MG/1
40 TABLET, EXTENDED RELEASE ORAL ONCE
Status: COMPLETED | OUTPATIENT
Start: 2022-05-28 | End: 2022-05-28

## 2022-05-28 RX ADMIN — POTASSIUM CHLORIDE 40 MEQ: 750 TABLET, EXTENDED RELEASE ORAL at 11:07

## 2022-05-28 RX ADMIN — INSULIN ASPART 1 UNITS: 100 INJECTION, SOLUTION INTRAVENOUS; SUBCUTANEOUS at 00:39

## 2022-05-28 RX ADMIN — BICTEGRAVIR SODIUM, EMTRICITABINE, AND TENOFOVIR ALAFENAMIDE FUMARATE 1 TABLET: 50; 200; 25 TABLET ORAL at 08:04

## 2022-05-28 RX ADMIN — INSULIN ASPART 2 UNITS: 100 INJECTION, SOLUTION INTRAVENOUS; SUBCUTANEOUS at 08:04

## 2022-05-28 RX ADMIN — HYDROMORPHONE HYDROCHLORIDE 2 MG: 2 TABLET ORAL at 14:40

## 2022-05-28 RX ADMIN — PANTOPRAZOLE SODIUM 40 MG: 40 TABLET, DELAYED RELEASE ORAL at 08:04

## 2022-05-28 RX ADMIN — SIMETHICONE 80 MG: 80 TABLET, CHEWABLE ORAL at 11:07

## 2022-05-28 RX ADMIN — INSULIN ASPART 2 UNITS: 100 INJECTION, SOLUTION INTRAVENOUS; SUBCUTANEOUS at 11:35

## 2022-05-28 RX ADMIN — METFORMIN HYDROCHLORIDE 500 MG: 500 TABLET, EXTENDED RELEASE ORAL at 16:50

## 2022-05-28 ASSESSMENT — ACTIVITIES OF DAILY LIVING (ADL)
DRESSING/BATHING_DIFFICULTY: NO
FALL_HISTORY_WITHIN_LAST_SIX_MONTHS: NO
ADLS_ACUITY_SCORE: 20
WEAR_GLASSES_OR_BLIND: NO
DOING_ERRANDS_INDEPENDENTLY_DIFFICULTY: NO
ADLS_ACUITY_SCORE: 37
CONCENTRATING,_REMEMBERING_OR_MAKING_DECISIONS_DIFFICULTY: NO
ADLS_ACUITY_SCORE: 20
ADLS_ACUITY_SCORE: 37
DIFFICULTY_EATING/SWALLOWING: NO
WALKING_OR_CLIMBING_STAIRS_DIFFICULTY: NO
ADLS_ACUITY_SCORE: 20
TOILETING_ISSUES: NO
ADLS_ACUITY_SCORE: 20
CHANGE_IN_FUNCTIONAL_STATUS_SINCE_ONSET_OF_CURRENT_ILLNESS/INJURY: NO

## 2022-05-28 NOTE — PLAN OF CARE
/71 (BP Location: Left arm)   Pulse 99   Temp 99.3  F (37.4  C) (Oral)   Resp 16   SpO2 100%      Shift: [id number]    Status: Partial SBO   Neuro: A&O x4  Respiratory: WDL on RA. Denies SOB   Cardiac: WDL. Denies cardiac chest pain   GI/: Voids w/out difficulty. LBM yesterdy- black stool per pt   Diet: Low fiber. BS w/ insulin order q 4h  Pain/nausea: Denies both   Incision/Drains: none  IV Access: L PIV SL   Labs: , 123  Activity: Up ad lexi    New changes this shift: Pt slept throughout the nights. Cares clustered to promote rest/sleep   Plan: Continue w/ POC

## 2022-05-28 NOTE — PLAN OF CARE
Goal Outcome Evaluation:    Plan of Care Reviewed With: patient     Overall Patient Progress: no change      Status: SBO  Neuro: A&Ox4; calling appropriately  Respiratory: sats mid 90s on RA. Denies SOB   Cardiac: HR: tachy. Denies cardiac chest pain   GI/: +BS, +flatus, pt reports continues to have black watery BMs. Last one reported this AM. Voiding without difficulty.   Diet: Pt c/o pain after eating lunch today. Diet backed down to full liquids.    Pain/nausea: C/O abdominal discomfort. Currently managed with PRN PO dilaudid.   IV Access: (L) PIV- SL   Labs: BS: 204 and 217.   Activity: Up ad lexi  Plan: still need to collect stool sample to r/o c.diff. continue to monitor and follow plan of care.

## 2022-05-28 NOTE — PROGRESS NOTES
Blood pressure 116/68, pulse 76, temperature 96.8  F (36  C), temperature source Oral, resp. rate 16, SpO2 100 %.    Activity: Up ad lexi  Neuros: AOX4 makes needs known  Cardiac: WDL denies chest pain  Respiratory: WDL denies SOB  GI/: Voiding spontaneous, patient states had BM today  Diet: Low Fiber  Skin/Incisions/Drains: None  Lines: Lt PIV  Pain: Denies pain  Plan: Patient refused assessment and admission questions and process.

## 2022-05-28 NOTE — PROGRESS NOTES
Brief Progress Note    Primary team visited with pt this afternoon; he reports epigastric pain is acutely worsened after eating and he has had several (3+) loose, dark stools. Loose stools started 2 weeks ago; infectious source unlikely, but given that he is immunocompromised, will check enteric panel and cryptosporidium. Dark, loose stools started again yesterday after gastrografin challenge which is the likely source of increased frequency and color change of his stools. Plan to return to full liquid diet given his pain; this will not hold up discharge planning in the next day. Patient declined oral oxycodone as he reports this is not helpful to him; placed order for oral hydromorphone instead. Unlikely to discharge today due to increased pain and diarrhea work-up and management.    Plan  - occult blood  - enteric panel, Cryptosporidium, c diff  - return to full liquid diet  - pain: switch to oral dilaudid    Michel Brown, MS4  \[facility]\"" Family Medicine Service    Rianna Patel MD  4:52 PM  05/28/22

## 2022-05-29 VITALS
DIASTOLIC BLOOD PRESSURE: 68 MMHG | RESPIRATION RATE: 18 BRPM | OXYGEN SATURATION: 98 % | TEMPERATURE: 96.5 F | HEART RATE: 71 BPM | WEIGHT: 161.8 LBS | BODY MASS INDEX: 27.62 KG/M2 | HEIGHT: 64 IN | SYSTOLIC BLOOD PRESSURE: 108 MMHG

## 2022-05-29 PROBLEM — F15.10 METHAMPHETAMINE ABUSE (H): Status: ACTIVE | Noted: 2022-05-29

## 2022-05-29 LAB
ANION GAP SERPL CALCULATED.3IONS-SCNC: 6 MMOL/L (ref 3–14)
BUN SERPL-MCNC: 9 MG/DL (ref 7–30)
CALCIUM SERPL-MCNC: 8.8 MG/DL (ref 8.5–10.1)
CHLORIDE BLD-SCNC: 108 MMOL/L (ref 94–109)
CO2 SERPL-SCNC: 26 MMOL/L (ref 20–32)
CREAT SERPL-MCNC: 0.6 MG/DL (ref 0.66–1.25)
ERYTHROCYTE [DISTWIDTH] IN BLOOD BY AUTOMATED COUNT: 13.7 % (ref 10–15)
GFR SERPL CREATININE-BSD FRML MDRD: >90 ML/MIN/1.73M2
GLUCOSE BLD-MCNC: 150 MG/DL (ref 70–99)
GLUCOSE BLDC GLUCOMTR-MCNC: 270 MG/DL (ref 70–99)
HCT VFR BLD AUTO: 34.4 % (ref 40–53)
HGB BLD-MCNC: 11.1 G/DL (ref 13.3–17.7)
MAGNESIUM SERPL-MCNC: 1.7 MG/DL (ref 1.6–2.3)
MCH RBC QN AUTO: 26.5 PG (ref 26.5–33)
MCHC RBC AUTO-ENTMCNC: 32.3 G/DL (ref 31.5–36.5)
MCV RBC AUTO: 82 FL (ref 78–100)
PHOSPHATE SERPL-MCNC: 2.6 MG/DL (ref 2.5–4.5)
PLATELET # BLD AUTO: 274 10E3/UL (ref 150–450)
POTASSIUM BLD-SCNC: 3.4 MMOL/L (ref 3.4–5.3)
RBC # BLD AUTO: 4.19 10E6/UL (ref 4.4–5.9)
SODIUM SERPL-SCNC: 140 MMOL/L (ref 133–144)
WBC # BLD AUTO: 4.9 10E3/UL (ref 4–11)

## 2022-05-29 PROCEDURE — 84100 ASSAY OF PHOSPHORUS: CPT | Performed by: STUDENT IN AN ORGANIZED HEALTH CARE EDUCATION/TRAINING PROGRAM

## 2022-05-29 PROCEDURE — 85027 COMPLETE CBC AUTOMATED: CPT | Performed by: STUDENT IN AN ORGANIZED HEALTH CARE EDUCATION/TRAINING PROGRAM

## 2022-05-29 PROCEDURE — 250N000013 HC RX MED GY IP 250 OP 250 PS 637: Performed by: STUDENT IN AN ORGANIZED HEALTH CARE EDUCATION/TRAINING PROGRAM

## 2022-05-29 PROCEDURE — 80048 BASIC METABOLIC PNL TOTAL CA: CPT | Performed by: STUDENT IN AN ORGANIZED HEALTH CARE EDUCATION/TRAINING PROGRAM

## 2022-05-29 PROCEDURE — 36415 COLL VENOUS BLD VENIPUNCTURE: CPT | Performed by: STUDENT IN AN ORGANIZED HEALTH CARE EDUCATION/TRAINING PROGRAM

## 2022-05-29 PROCEDURE — 99239 HOSP IP/OBS DSCHRG MGMT >30: CPT | Mod: GC | Performed by: FAMILY MEDICINE

## 2022-05-29 PROCEDURE — 83735 ASSAY OF MAGNESIUM: CPT | Performed by: STUDENT IN AN ORGANIZED HEALTH CARE EDUCATION/TRAINING PROGRAM

## 2022-05-29 RX ORDER — BUPROPION HYDROCHLORIDE 300 MG/1
300 TABLET ORAL DAILY
Qty: 30 TABLET | Refills: 0 | Status: SHIPPED | OUTPATIENT
Start: 2022-05-29 | End: 2022-08-31

## 2022-05-29 RX ORDER — METFORMIN HCL 500 MG
500 TABLET, EXTENDED RELEASE 24 HR ORAL
Qty: 30 TABLET | Refills: 0 | Status: SHIPPED | OUTPATIENT
Start: 2022-05-29 | End: 2022-08-31 | Stop reason: ALTCHOICE

## 2022-05-29 RX ADMIN — PANTOPRAZOLE SODIUM 40 MG: 40 TABLET, DELAYED RELEASE ORAL at 08:45

## 2022-05-29 RX ADMIN — BICTEGRAVIR SODIUM, EMTRICITABINE, AND TENOFOVIR ALAFENAMIDE FUMARATE 1 TABLET: 50; 200; 25 TABLET ORAL at 08:45

## 2022-05-29 ASSESSMENT — ACTIVITIES OF DAILY LIVING (ADL)
ADLS_ACUITY_SCORE: 20

## 2022-05-29 NOTE — PLAN OF CARE
"Shift: 9060-1498    /64 (BP Location: Right arm)   Pulse 85   Temp 97.5  F (36.4  C) (Oral)   Resp 16   Ht 1.626 m (5' 4\")   Wt 73.4 kg (161 lb 12.8 oz)   SpO2 99%   BMI 27.77 kg/m      Activity: up ad lexi  Neuros: A&Ox4, calls appropriately   GI/: c/o abdominal discomfort, managed with PRN PO dilaudid; +BS, +flatus; voids spontaneously, not saving  Diet: full liquid diet  Lines: (L) PIV saline locked  Labs:   Pain/nausea: denies nausea; denies pain  Plan: Continue POC      "

## 2022-05-29 NOTE — PROGRESS NOTES
Patient discharged  at 12:40 PM. Pt refused to go over discharge paper with nurse, but discharge paper signed.  All belongings sent with patient. IV taken out.     Alexa Beth RN

## 2022-05-29 NOTE — PLAN OF CARE
"  Problem: Plan of Care - These are the overarching goals to be used throughout the patient stay.    Goal: Optimal Comfort and Wellbeing  Outcome: Ongoing, Progressing     Problem: Nausea and Vomiting (Intestinal Obstruction)  Goal: Nausea and Vomiting Relief  Outcome: Ongoing, Progressing     Problem: Pain (Intestinal Obstruction)  Goal: Acceptable Pain Control  Outcome: Ongoing, Progressing     Pt is alert and oriented X 4. Denies pain at this time. Blood glucose level was 331 at bedtime. 3 units of insulin given. Multiple requests for food. Denies nausea or vomiting. Independent with activities. Call light appropriate. /60   Pulse 88   Temp 97  F (36.1  C) (Oral)   Resp 17   Ht 1.626 m (5' 4\")   Wt 73.4 kg (161 lb 12.8 oz)   SpO2 98%. Left PIV SL. Will continue to monitor.   "

## 2022-05-30 NOTE — DISCHARGE SUMMARY
[facility]  Discharge Summary - Medicine & Pediatrics       Date of Admission:  5/25/2022  Date of Discharge:  5/29/2022 12:39 PM  Discharging Provider: Dr. Key  Discharge Service: [facility] Medicine Service    Discharge Diagnoses   Partial SBO  Methamphetamine use  Type II Diabetes     Follow-ups Needed After Discharge   Follow-up Appointments     Follow Up and recommended labs and tests      Follow up with primary care provider, Ayala Prieto, within 7 days for   hospital follow- up.  No follow up labs or test are needed.             Unresulted Labs Ordered in the Past 30 Days of this Admission     No orders found from 4/25/2022 to 5/26/2022.        Discharge Disposition   Discharged to home  Condition at discharge: Stable      Hospital Course   Andrew Lockwood was admitted on 5/25/2022 for recurrent abdominal pain due to partial small bowel obstruction.  The following problems were addressed during his hospitalization:     # Recurrent SBO  # Abdominal pain  # Nausea/vomiting  55 y/o w/ hx recurrent SBO with many prior hospitalizations, presents 5/25 with acute onset abdominal pain that started on 5/24. In ED, CT showed pSBO and exam consistent with SBO given diffuse tenderness and distension. Pt declined NG throughout admission and his course was managed conservatively with IV pain medication. Gastrografin study on 5/27 showed resolved obstruction. Continued to advance his diet, tolerating regular diet on 5/29 without nausea or vomiting.      # Substance use disorder  # History of housing instability  Presented to the ED after having last used meth on 5/24; patient was interested in detox and resources for quitting. Has several years' hx meth use and interval sobriety over the last few years. Smokes; does not inject. Addiction medicine consulted and pt was agreeable to trying medications to help curb cravings. Started Wellbutrin XR per addiction team for  two days and patient declined the following days, as he was unsure about the medication effects and wished to do his own research first. Plan had also included naltrexone 50mg when he was off opioid pain medication, to be implemented after discharge. An outpatient addiction medicine referral is in place if he decides later to follow up with them.  No other substance use concerns, including EtOH. Living at [facility], currently working to get independent housing. Was working to get his ID in the days prior to admission. Declines talking to SW in the hospital ; says he has more resources through [employer].   - Addiction medicine appointment to be facilitated by inpatient addiction team  - Wellbutrin XR 300mg prescribed on discharge, may increase to 450mg if patient is agreeable and tolerating current dose      # Poorly controlled type II DM  Last A1c 11.2 in 02/2022. Home meds include jardiance 25 mg, metformin 500mg daily - reports adherence. Not taking insulin though has previously been prescribed. While hospitalized, his sugars were managed with sliding scale, followed by resumption of home regimen. He was discharged on his home regimen, though would likely benefit from a third oral agent such as januvia, to be followed up by PCP.      # Episodes of loose stools  # Possible hx of SIBO  Multiple references to SIBO in patient's chart dating back several years based on patient's sx of recurrent diarrhea and bloating; has not had carbohydrate breath test for official dx. During last hospitalizations in March 2022, patient was placed on BID ciprofloxacin for possible SIBO. Course was supposed to be 14 days, but patient compliance outside of hospital is unclear. Patient is endorsing large volume loose stools for 2 days PTA. Patient then had several days without bowel movements, and then endorsed additional loose stools on 5/28.   Stool studies ordered, given history of HIV: c-diff, cryptosporidium, enteric panel, occult fecal  blood. Stool sample not collected as diarrhea resolved. May consider additional work up if loose stools persist outpatient.      Chronic/Stable:     #HIV  Stable on Biktarvy, which patient has brought with him. Last CD4 count 5/24 was 526.  - PTA Biktarvy     #GERD  - PTA protonix 40 daily    Consultations This Hospital Stay   SURGERY GENERAL ADULT IP CONSULT  ADDICTION SERVICE ADULT IP CONSULT FOR [place]    Code Status   Prior       The patient was discussed with MD Lissy Bailey's Service  [facility] [address] [place]  [address]  [place] [address]  Phone: [phone]  ______________________________________________________________________    Physical Exam   Vital Signs: Temp: (!) 96.5  F (35.8  C) Temp src: Oral BP: 108/68 Pulse: 71   Resp: 18 SpO2: 98 %      Weight: 161 lbs 12.8 oz  Constitutional: awake, alert, appears mildly uncomfortable  Eyes: pupils equal, extra ocular muscles grossly intact, sclera clear, conjunctiva normal  ENT: normocepalic, without obvious abnormality  Respiratory: No increased work of breathing, good air exchange, clear to auscultation bilaterally, no crackles or wheezing  Cardiovascular: Well perfused, regular rate  GI: no tenderness to palpation  Skin: no bruising or bleeding, no redness, warmth, or swelling, no rashes and no jaundice  Neuropsychiatric:   Level of consciousness: alert / normal (improved from previous days)  Orientation: oriented to self, place, time and situation      Primary Care Physician   Ayala Prieto    Discharge Orders      Adult Mental Health  Referral      Reason for your hospital stay    You were hospitalized with a small bowel obstruction, which cleared with conservative management.     Activity    Your activity upon discharge: activity as tolerated     Follow Up and recommended labs and tests    Follow up with primary care provider, Ayala Prieto, within 7 days for hospital follow- up.  No  follow up labs or test are needed.     Diet    Follow this diet upon discharge: regular diet       Significant Results and Procedures   Most Recent 3 CBC's:Recent Labs   Lab Test 05/29/22  0727 05/28/22  0718 05/27/22  0610   WBC 4.9 5.8 5.3   HGB 11.1* 11.3* 11.1*   MCV 82 83 85    287 282     Most Recent 3 BMP's:Recent Labs   Lab Test 05/29/22  1141 05/29/22  0727 05/28/22  2318 05/28/22  1826 05/28/22  1537 05/28/22  0739 05/28/22  0718 05/27/22  0632 05/27/22  0610   NA  --  140  --   --   --   --  140  --  141   POTASSIUM  --  3.4  --   --  3.7  --  3.3*   < > 3.2*   CHLORIDE  --  108  --   --   --   --  108  --  107   CO2  --  26  --   --   --   --  27  --  27   BUN  --  9  --   --   --   --  10  --  8   CR  --  0.60*  --   --   --   --  0.64*  --  0.72   ANIONGAP  --  6  --   --   --   --  5  --  7   LINDA  --  8.8  --   --   --   --  8.6  --  8.2*   * 150* 331*   < >  --    < > 180*   < > 196*    < > = values in this interval not displayed.     Most Recent 2 LFT's:Recent Labs   Lab Test 05/25/22  0141 05/24/22  1051   AST 17 16   ALT 21 16   ALKPHOS 98 99   BILITOTAL 0.7 0.5       Discharge Medications   Discharge Medication List as of 5/29/2022 11:56 AM      START taking these medications    Details   buPROPion (WELLBUTRIN XL) 300 MG 24 hr tablet Take 1 tablet (300 mg) by mouth daily, Disp-30 tablet, R-0, E-Prescribe         CONTINUE these medications which have CHANGED    Details   empagliflozin (JARDIANCE) 25 MG TABS tablet Take 1 tablet (25 mg) by mouth daily, Disp-30 tablet, R-0, E-Prescribe      metFORMIN (GLUCOPHAGE XR) 500 MG 24 hr tablet Take 1 tablet (500 mg) by mouth daily (with dinner), Disp-30 tablet, R-0, E-Prescribe         CONTINUE these medications which have NOT CHANGED    Details   Alcohol Swabs PADS Use to swab the area of the injection or todd as directed Per insurance coverage, Disp-100 each, R-0, E-Prescribe      bictegravir-emtricitabine-tenofovir (BIKTARVY) -25 MG  per tablet Take 1 tablet by mouth daily, Disp-30 tablet, R-0, E-Prescribe      blood glucose (NO BRAND SPECIFIED) test strip Use to test blood sugar 4 times daily or as directed. To accompany:whatever is covered, Disp-360 strip, R-3, E-Prescribe      blood glucose calibration (NO BRAND SPECIFIED) solution Used to calibrate the blood glucose monitor as needed and as directed.  To accompany  blood glucose brands per insurance coverage, Disp-1 each, R-0, E-Prescribe      blood glucose monitoring (NO BRAND SPECIFIED) meter device kit Use as directed. Per insurance coverage.Disp-1 kit, [id number]      insulin pen needle (BD SCOTT U/F) 32G X 4 MM miscellaneous USE 3 TO 4 NEEDLES DAILY. Follow up visit needed. Call  to schedule.Disp-400 each, [id number]      loperamide (IMODIUM A-D) 2 MG tablet Take 1 tablet (2 mg) by mouth 4 times daily as needed for diarrhea, Disp-12 tablet, R-0, E-Prescribe      pantoprazole (PROTONIX) 40 MG EC tablet Take 1 tablet (40 mg) by mouth daily, Disp-30 tablet, R-6, E-Prescribe      polyethylene glycol (MIRALAX) 17 GM/Dose powder Take 17 g by mouth daily, Disp-510 g, R-3, E-Prescribe      Sharps Container MISC Use as directed to dispose of needles, lancets and other sharps. Per Insurance coverage, Disp-1 each, R-0, E-Prescribe      simethicone (MYLICON) 80 MG chewable tablet Take 1 tablet (80 mg) by mouth every 6 hours as needed for cramping, Disp-90 tablet, R-0, E-Prescribe      STATIN NOT PRESCRIBED (INTENTIONAL) Reason Statin was Not Prescribed: OTHER - Enter reason is comments section (This option does NOTexclude patient from measure) / noted in chart that is recommended multiple timesNo Print Out      thin (NO BRAND SPECIFIED) lancets Use with lanceting device. To accompany: Test 4 times daily with whatever is covered, Disp-360 each, R-3, E-Prescribe         STOP taking these medications       ciprofloxacin (CIPRO) 500 MG tablet Comments:   Reason for Stopping:          "glucose 40 % (400 mg/mL) gel Comments:   Reason for Stopping:         insulin detemir (LEVEMIR PEN) 100 UNIT/ML pen Comments:   Reason for Stopping:         NOVOLOG FLEXPEN 100 UNIT/ML soln Comments:   Reason for Stopping:             Allergies   Allergies   Allergen Reactions     Metformin      Abdominal pain     Tylenol [Acetaminophen] Itching     Dulaglutide Rash     Insulin Lispro Rash     Patient reported     Penicillin V Other (See Comments) and Rash     Diffuse maculopapular rash + feels \"high\", per pt.      "

## 2022-05-31 ENCOUNTER — TELEPHONE (OUTPATIENT)
Dept: SURGERY | Facility: CLINIC | Age: 57
End: 2022-05-31
Payer: COMMERCIAL

## 2022-05-31 ENCOUNTER — PATIENT OUTREACH (OUTPATIENT)
Dept: CARE COORDINATION | Facility: CLINIC | Age: 57
End: 2022-05-31
Payer: COMMERCIAL

## 2022-05-31 DIAGNOSIS — Z71.89 OTHER SPECIFIED COUNSELING: ICD-10-CM

## 2022-05-31 NOTE — PROGRESS NOTES
Clinic Care Coordination Contact  [facility]/Voicemail       Clinical Data: Care Coordinator Outreach    Outreach attempted x 1. Unable to leave message on patient's voicemail with call back information, mailbox is full    Plan: Care Coordinator will try to reach patient again in 1-2 business days.    Gill Miranda, [facility]  [phone]  [facility]

## 2022-05-31 NOTE — TELEPHONE ENCOUNTER
Voicemail is full. Called to schedule Please Schedule This Appointment:     With: Carrie Mary NP     Referring Provider: self     For / Appt Notes: micro     Appt Type: DAKSHA Kim     Appt Date/Time: next available     Thank you!

## 2022-06-01 NOTE — PROGRESS NOTES
Clinic Care Coordination Contact  [facility]/Voicemail       Clinical Data: Care Coordinator Outreach    Outreach attempted x 2.  Unable to leave a message on patient's voicemail with call back information, mailbox is full    Plan:  Care Coordinator will do no further outreaches at this time.    Gill Miranda, [facility]  [phone]  [facility]

## 2022-06-05 ENCOUNTER — HOSPITAL ENCOUNTER (INPATIENT)
Facility: CLINIC | Age: 57
LOS: 1 days | Discharge: HOME | End: 2022-06-07
Attending: EMERGENCY MEDICINE | Admitting: STUDENT IN AN ORGANIZED HEALTH CARE EDUCATION/TRAINING PROGRAM
Payer: COMMERCIAL

## 2022-06-05 ENCOUNTER — APPOINTMENT (OUTPATIENT)
Dept: CT IMAGING | Facility: CLINIC | Age: 57
End: 2022-06-05
Attending: EMERGENCY MEDICINE
Payer: COMMERCIAL

## 2022-06-05 DIAGNOSIS — R10.13 ABDOMINAL PAIN, EPIGASTRIC: ICD-10-CM

## 2022-06-05 DIAGNOSIS — R10.84 GENERALIZED ABDOMINAL PAIN: Primary | ICD-10-CM

## 2022-06-05 DIAGNOSIS — K31.1 GASTRIC OUTLET OBSTRUCTION: ICD-10-CM

## 2022-06-05 DIAGNOSIS — Z11.52 ENCOUNTER FOR SCREENING LABORATORY TESTING FOR SEVERE ACUTE RESPIRATORY SYNDROME CORONAVIRUS 2 (SARS-COV-2): ICD-10-CM

## 2022-06-05 LAB
ALBUMIN SERPL-MCNC: 3.8 G/DL (ref 3.4–5)
ALP SERPL-CCNC: 105 U/L (ref 40–150)
ALT SERPL W P-5'-P-CCNC: 27 U/L (ref 0–70)
ANION GAP SERPL CALCULATED.3IONS-SCNC: 5 MMOL/L (ref 3–14)
AST SERPL W P-5'-P-CCNC: 18 U/L (ref 0–45)
BASOPHILS # BLD AUTO: 0 10E3/UL (ref 0–0.2)
BASOPHILS NFR BLD AUTO: 0 %
BILIRUB SERPL-MCNC: 0.8 MG/DL (ref 0.2–1.3)
BUN SERPL-MCNC: 18 MG/DL (ref 7–30)
CALCIUM SERPL-MCNC: 9.6 MG/DL (ref 8.5–10.1)
CHLORIDE BLD-SCNC: 104 MMOL/L (ref 94–109)
CO2 SERPL-SCNC: 29 MMOL/L (ref 20–32)
CREAT SERPL-MCNC: 0.84 MG/DL (ref 0.66–1.25)
EOSINOPHIL # BLD AUTO: 0.2 10E3/UL (ref 0–0.7)
EOSINOPHIL NFR BLD AUTO: 2 %
ERYTHROCYTE [DISTWIDTH] IN BLOOD BY AUTOMATED COUNT: 14.3 % (ref 10–15)
GFR SERPL CREATININE-BSD FRML MDRD: >90 ML/MIN/1.73M2
GLUCOSE BLD-MCNC: 196 MG/DL (ref 70–99)
HCT VFR BLD AUTO: 42.2 % (ref 40–53)
HGB BLD-MCNC: 13.7 G/DL (ref 13.3–17.7)
IMM GRANULOCYTES # BLD: 0.1 10E3/UL
IMM GRANULOCYTES NFR BLD: 1 %
INR PPP: 1.44 (ref 0.85–1.15)
LACTATE SERPL-SCNC: 1.5 MMOL/L (ref 0.7–2)
LIPASE SERPL-CCNC: 120 U/L (ref 73–393)
LYMPHOCYTES # BLD AUTO: 0.7 10E3/UL (ref 0.8–5.3)
LYMPHOCYTES NFR BLD AUTO: 7 %
MCH RBC QN AUTO: 26.8 PG (ref 26.5–33)
MCHC RBC AUTO-ENTMCNC: 32.5 G/DL (ref 31.5–36.5)
MCV RBC AUTO: 82 FL (ref 78–100)
MONOCYTES # BLD AUTO: 0.4 10E3/UL (ref 0–1.3)
MONOCYTES NFR BLD AUTO: 4 %
NEUTROPHILS # BLD AUTO: 8.6 10E3/UL (ref 1.6–8.3)
NEUTROPHILS NFR BLD AUTO: 86 %
NRBC # BLD AUTO: 0 10E3/UL
NRBC BLD AUTO-RTO: 0 /100
PLATELET # BLD AUTO: 346 10E3/UL (ref 150–450)
POTASSIUM BLD-SCNC: 3.6 MMOL/L (ref 3.4–5.3)
PROT SERPL-MCNC: 7.9 G/DL (ref 6.8–8.8)
RBC # BLD AUTO: 5.12 10E6/UL (ref 4.4–5.9)
SARS-COV-2 RNA RESP QL NAA+PROBE: NEGATIVE
SODIUM SERPL-SCNC: 138 MMOL/L (ref 133–144)
WBC # BLD AUTO: 10 10E3/UL (ref 4–11)

## 2022-06-05 PROCEDURE — 83605 ASSAY OF LACTIC ACID: CPT | Performed by: EMERGENCY MEDICINE

## 2022-06-05 PROCEDURE — 250N000011 HC RX IP 250 OP 636: Performed by: EMERGENCY MEDICINE

## 2022-06-05 PROCEDURE — 99285 EMERGENCY DEPT VISIT HI MDM: CPT | Performed by: EMERGENCY MEDICINE

## 2022-06-05 PROCEDURE — 99285 EMERGENCY DEPT VISIT HI MDM: CPT | Mod: 25 | Performed by: EMERGENCY MEDICINE

## 2022-06-05 PROCEDURE — 80053 COMPREHEN METABOLIC PANEL: CPT | Performed by: EMERGENCY MEDICINE

## 2022-06-05 PROCEDURE — 96374 THER/PROPH/DIAG INJ IV PUSH: CPT | Mod: 59 | Performed by: EMERGENCY MEDICINE

## 2022-06-05 PROCEDURE — U0003 INFECTIOUS AGENT DETECTION BY NUCLEIC ACID (DNA OR RNA); SEVERE ACUTE RESPIRATORY SYNDROME CORONAVIRUS 2 (SARS-COV-2) (CORONAVIRUS DISEASE [COVID-19]), AMPLIFIED PROBE TECHNIQUE, MAKING USE OF HIGH THROUGHPUT TECHNOLOGIES AS DESCRIBED BY CMS-2020-01-R: HCPCS | Performed by: EMERGENCY MEDICINE

## 2022-06-05 PROCEDURE — 85014 HEMATOCRIT: CPT | Performed by: EMERGENCY MEDICINE

## 2022-06-05 PROCEDURE — 83690 ASSAY OF LIPASE: CPT | Performed by: EMERGENCY MEDICINE

## 2022-06-05 PROCEDURE — 74177 CT ABD & PELVIS W/CONTRAST: CPT

## 2022-06-05 PROCEDURE — 85610 PROTHROMBIN TIME: CPT | Performed by: EMERGENCY MEDICINE

## 2022-06-05 PROCEDURE — 74177 CT ABD & PELVIS W/CONTRAST: CPT | Mod: 26 | Performed by: RADIOLOGY

## 2022-06-05 PROCEDURE — 36415 COLL VENOUS BLD VENIPUNCTURE: CPT | Performed by: EMERGENCY MEDICINE

## 2022-06-05 PROCEDURE — C9803 HOPD COVID-19 SPEC COLLECT: HCPCS | Performed by: EMERGENCY MEDICINE

## 2022-06-05 RX ORDER — IOPAMIDOL 755 MG/ML
95 INJECTION, SOLUTION INTRAVASCULAR ONCE
Status: COMPLETED | OUTPATIENT
Start: 2022-06-05 | End: 2022-06-05

## 2022-06-05 RX ORDER — HYDROMORPHONE HYDROCHLORIDE 1 MG/ML
0.5 INJECTION, SOLUTION INTRAMUSCULAR; INTRAVENOUS; SUBCUTANEOUS ONCE
Status: COMPLETED | OUTPATIENT
Start: 2022-06-05 | End: 2022-06-05

## 2022-06-05 RX ADMIN — HYDROMORPHONE HYDROCHLORIDE 0.5 MG: 1 INJECTION, SOLUTION INTRAMUSCULAR; INTRAVENOUS; SUBCUTANEOUS at 22:29

## 2022-06-05 RX ADMIN — IOPAMIDOL 95 ML: 755 INJECTION, SOLUTION INTRAVENOUS at 23:01

## 2022-06-05 ASSESSMENT — ENCOUNTER SYMPTOMS
CHILLS: 0
ABDOMINAL PAIN: 1
FATIGUE: 1
SHORTNESS OF BREATH: 0
DIFFICULTY URINATING: 0
FEVER: 0
CONSTIPATION: 1
NAUSEA: 1
VOMITING: 1
NECK PAIN: 0
NECK STIFFNESS: 0
HEMATURIA: 0
FREQUENCY: 0
LIGHT-HEADEDNESS: 0
DIZZINESS: 0
BACK PAIN: 0
DYSURIA: 0

## 2022-06-05 NOTE — LETTER
Transition Communication Hand-off for Care Transitions to Next Level of Care Provider    Name: Andrew Lockwood  : 1967  MRN #: [id number]  Primary Care Provider: Ayala Prieto     Primary Clinic: [address] [place] [id number]     Reason for Hospitalization:  Abdominal pain, epigastric [R10.13]  Gastric outlet obstruction [K31.1]  Admit Date/Time: 2022  9:26 PM  Discharge Date: 2022    Payor Source: Payor: [facility] / Plan: [facility] INDIVIDUAL / Product Type: Indemnity /          Reason for Communication Hand-off Referral: Other  continuity of care    Discharge Plan: per attached AVS     Concern for non-adherence with plan of care:   Y/N no  Discharge Needs Assessment:  Needs    Flowsheet Row Most Recent Value   Anticipated Changes Related to Illness none   Equipment Currently Used at Home none          Follow-up plan:  No future appointments.    Any outstanding tests or procedures:  none            Key Recommendations:  See attached AVS    Nico Brian, RN    AVS/Discharge Summary is the source of truth; this is a helpful guide for improved communication of patient story

## 2022-06-06 PROBLEM — K31.1 GASTRIC OUTLET OBSTRUCTION: Status: ACTIVE | Noted: 2022-06-06

## 2022-06-06 PROBLEM — R10.13 ABDOMINAL PAIN, EPIGASTRIC: Status: ACTIVE | Noted: 2022-06-06

## 2022-06-06 LAB
ANION GAP SERPL CALCULATED.3IONS-SCNC: 5 MMOL/L (ref 3–14)
BUN SERPL-MCNC: 14 MG/DL (ref 7–30)
CALCIUM SERPL-MCNC: 8.6 MG/DL (ref 8.5–10.1)
CHLORIDE BLD-SCNC: 108 MMOL/L (ref 94–109)
CO2 SERPL-SCNC: 24 MMOL/L (ref 20–32)
CREAT SERPL-MCNC: 0.64 MG/DL (ref 0.66–1.25)
ERYTHROCYTE [DISTWIDTH] IN BLOOD BY AUTOMATED COUNT: 14.6 % (ref 10–15)
GFR SERPL CREATININE-BSD FRML MDRD: >90 ML/MIN/1.73M2
GLUCOSE BLD-MCNC: 139 MG/DL (ref 70–99)
GLUCOSE BLDC GLUCOMTR-MCNC: 125 MG/DL (ref 70–99)
GLUCOSE BLDC GLUCOMTR-MCNC: 134 MG/DL (ref 70–99)
GLUCOSE BLDC GLUCOMTR-MCNC: 142 MG/DL (ref 70–99)
GLUCOSE BLDC GLUCOMTR-MCNC: 191 MG/DL (ref 70–99)
HCT VFR BLD AUTO: 36.6 % (ref 40–53)
HGB BLD-MCNC: 11.7 G/DL (ref 13.3–17.7)
MCH RBC QN AUTO: 26.7 PG (ref 26.5–33)
MCHC RBC AUTO-ENTMCNC: 32 G/DL (ref 31.5–36.5)
MCV RBC AUTO: 84 FL (ref 78–100)
PLATELET # BLD AUTO: 293 10E3/UL (ref 150–450)
POTASSIUM BLD-SCNC: 3.6 MMOL/L (ref 3.4–5.3)
RBC # BLD AUTO: 4.38 10E6/UL (ref 4.4–5.9)
SODIUM SERPL-SCNC: 137 MMOL/L (ref 133–144)
WBC # BLD AUTO: 6.7 10E3/UL (ref 4–11)

## 2022-06-06 PROCEDURE — 96361 HYDRATE IV INFUSION ADD-ON: CPT | Performed by: EMERGENCY MEDICINE

## 2022-06-06 PROCEDURE — 85027 COMPLETE CBC AUTOMATED: CPT

## 2022-06-06 PROCEDURE — 36415 COLL VENOUS BLD VENIPUNCTURE: CPT

## 2022-06-06 PROCEDURE — 99254 IP/OBS CNSLTJ NEW/EST MOD 60: CPT | Performed by: PHYSICIAN ASSISTANT

## 2022-06-06 PROCEDURE — 99232 SBSQ HOSP IP/OBS MODERATE 35: CPT | Performed by: SURGERY

## 2022-06-06 PROCEDURE — 258N000003 HC RX IP 258 OP 636: Performed by: EMERGENCY MEDICINE

## 2022-06-06 PROCEDURE — 120N000002 HC R&B MED SURG/OB UMMC

## 2022-06-06 PROCEDURE — C9113 INJ PANTOPRAZOLE SODIUM, VIA: HCPCS | Performed by: EMERGENCY MEDICINE

## 2022-06-06 PROCEDURE — 96375 TX/PRO/DX INJ NEW DRUG ADDON: CPT | Performed by: EMERGENCY MEDICINE

## 2022-06-06 PROCEDURE — 250N000011 HC RX IP 250 OP 636: Performed by: INTERNAL MEDICINE

## 2022-06-06 PROCEDURE — 250N000011 HC RX IP 250 OP 636: Performed by: STUDENT IN AN ORGANIZED HEALTH CARE EDUCATION/TRAINING PROGRAM

## 2022-06-06 PROCEDURE — 99223 1ST HOSP IP/OBS HIGH 75: CPT | Mod: AI | Performed by: STUDENT IN AN ORGANIZED HEALTH CARE EDUCATION/TRAINING PROGRAM

## 2022-06-06 PROCEDURE — 250N000011 HC RX IP 250 OP 636

## 2022-06-06 PROCEDURE — C9113 INJ PANTOPRAZOLE SODIUM, VIA: HCPCS | Performed by: STUDENT IN AN ORGANIZED HEALTH CARE EDUCATION/TRAINING PROGRAM

## 2022-06-06 PROCEDURE — 43239 EGD BIOPSY SINGLE/MULTIPLE: CPT | Performed by: INTERNAL MEDICINE

## 2022-06-06 PROCEDURE — 80048 BASIC METABOLIC PNL TOTAL CA: CPT

## 2022-06-06 PROCEDURE — 96376 TX/PRO/DX INJ SAME DRUG ADON: CPT | Performed by: EMERGENCY MEDICINE

## 2022-06-06 PROCEDURE — G0500 MOD SEDAT ENDO SERVICE >5YRS: HCPCS | Performed by: INTERNAL MEDICINE

## 2022-06-06 PROCEDURE — 250N000011 HC RX IP 250 OP 636: Performed by: EMERGENCY MEDICINE

## 2022-06-06 PROCEDURE — 250N000012 HC RX MED GY IP 250 OP 636 PS 637

## 2022-06-06 PROCEDURE — 250N000013 HC RX MED GY IP 250 OP 250 PS 637: Performed by: STUDENT IN AN ORGANIZED HEALTH CARE EDUCATION/TRAINING PROGRAM

## 2022-06-06 PROCEDURE — 88305 TISSUE EXAM BY PATHOLOGIST: CPT | Mod: TC | Performed by: INTERNAL MEDICINE

## 2022-06-06 RX ORDER — PANTOPRAZOLE SODIUM 20 MG/1
20 TABLET, DELAYED RELEASE ORAL
Status: DISCONTINUED | OUTPATIENT
Start: 2022-06-06 | End: 2022-06-07 | Stop reason: HOSPADM

## 2022-06-06 RX ORDER — SODIUM CHLORIDE, SODIUM LACTATE, POTASSIUM CHLORIDE, CALCIUM CHLORIDE 600; 310; 30; 20 MG/100ML; MG/100ML; MG/100ML; MG/100ML
INJECTION, SOLUTION INTRAVENOUS CONTINUOUS
Status: DISCONTINUED | OUTPATIENT
Start: 2022-06-06 | End: 2022-06-06

## 2022-06-06 RX ORDER — HYDROMORPHONE HYDROCHLORIDE 1 MG/ML
0.5 INJECTION, SOLUTION INTRAMUSCULAR; INTRAVENOUS; SUBCUTANEOUS
Status: COMPLETED | OUTPATIENT
Start: 2022-06-06 | End: 2022-06-06

## 2022-06-06 RX ORDER — HYDROMORPHONE HYDROCHLORIDE 1 MG/ML
0.5 INJECTION, SOLUTION INTRAMUSCULAR; INTRAVENOUS; SUBCUTANEOUS
Status: DISCONTINUED | OUTPATIENT
Start: 2022-06-06 | End: 2022-06-06

## 2022-06-06 RX ORDER — LIDOCAINE 40 MG/G
CREAM TOPICAL
Status: DISCONTINUED | OUTPATIENT
Start: 2022-06-06 | End: 2022-06-07 | Stop reason: HOSPADM

## 2022-06-06 RX ORDER — NICOTINE POLACRILEX 4 MG
15-30 LOZENGE BUCCAL
Status: DISCONTINUED | OUTPATIENT
Start: 2022-06-06 | End: 2022-06-07 | Stop reason: HOSPADM

## 2022-06-06 RX ORDER — ACETAMINOPHEN 325 MG/1
650 TABLET ORAL EVERY 6 HOURS PRN
Status: DISCONTINUED | OUTPATIENT
Start: 2022-06-06 | End: 2022-06-07 | Stop reason: HOSPADM

## 2022-06-06 RX ORDER — DEXTROSE MONOHYDRATE 25 G/50ML
25-50 INJECTION, SOLUTION INTRAVENOUS
Status: DISCONTINUED | OUTPATIENT
Start: 2022-06-06 | End: 2022-06-07 | Stop reason: HOSPADM

## 2022-06-06 RX ORDER — HYDROMORPHONE HYDROCHLORIDE 1 MG/ML
0.5 INJECTION, SOLUTION INTRAMUSCULAR; INTRAVENOUS; SUBCUTANEOUS EVERY 6 HOURS PRN
Status: DISCONTINUED | OUTPATIENT
Start: 2022-06-06 | End: 2022-06-06

## 2022-06-06 RX ORDER — FENTANYL CITRATE 50 UG/ML
INJECTION, SOLUTION INTRAMUSCULAR; INTRAVENOUS PRN
Status: COMPLETED | OUTPATIENT
Start: 2022-06-06 | End: 2022-06-06

## 2022-06-06 RX ORDER — ONDANSETRON 2 MG/ML
4 INJECTION INTRAMUSCULAR; INTRAVENOUS EVERY 6 HOURS PRN
Status: DISCONTINUED | OUTPATIENT
Start: 2022-06-06 | End: 2022-06-07 | Stop reason: HOSPADM

## 2022-06-06 RX ADMIN — FENTANYL CITRATE 25 MCG: 50 INJECTION, SOLUTION INTRAMUSCULAR; INTRAVENOUS at 15:40

## 2022-06-06 RX ADMIN — HYDROMORPHONE HYDROCHLORIDE 0.5 MG: 1 INJECTION, SOLUTION INTRAMUSCULAR; INTRAVENOUS; SUBCUTANEOUS at 05:48

## 2022-06-06 RX ADMIN — MIDAZOLAM 1 MG: 1 INJECTION INTRAMUSCULAR; INTRAVENOUS at 15:40

## 2022-06-06 RX ADMIN — ONDANSETRON 4 MG: 2 INJECTION INTRAMUSCULAR; INTRAVENOUS at 05:48

## 2022-06-06 RX ADMIN — PANTOPRAZOLE SODIUM 40 MG: 40 INJECTION, POWDER, FOR SOLUTION INTRAVENOUS at 09:48

## 2022-06-06 RX ADMIN — SODIUM CHLORIDE 500 ML: 9 INJECTION, SOLUTION INTRAVENOUS at 00:41

## 2022-06-06 RX ADMIN — PANTOPRAZOLE SODIUM 40 MG: 40 INJECTION, POWDER, FOR SOLUTION INTRAVENOUS at 00:41

## 2022-06-06 RX ADMIN — HYDROMORPHONE HYDROCHLORIDE 0.5 MG: 1 INJECTION, SOLUTION INTRAMUSCULAR; INTRAVENOUS; SUBCUTANEOUS at 00:41

## 2022-06-06 RX ADMIN — FENTANYL CITRATE 25 MCG: 50 INJECTION, SOLUTION INTRAMUSCULAR; INTRAVENOUS at 15:47

## 2022-06-06 RX ADMIN — INSULIN ASPART 2 UNITS: 100 INJECTION, SOLUTION INTRAVENOUS; SUBCUTANEOUS at 20:22

## 2022-06-06 RX ADMIN — MIDAZOLAM 2 MG: 1 INJECTION INTRAMUSCULAR; INTRAVENOUS at 15:36

## 2022-06-06 RX ADMIN — MIDAZOLAM 1 MG: 1 INJECTION INTRAMUSCULAR; INTRAVENOUS at 15:47

## 2022-06-06 RX ADMIN — HYDROMORPHONE HYDROCHLORIDE 0.5 MG: 1 INJECTION, SOLUTION INTRAMUSCULAR; INTRAVENOUS; SUBCUTANEOUS at 09:55

## 2022-06-06 RX ADMIN — FENTANYL CITRATE 50 MCG: 50 INJECTION, SOLUTION INTRAMUSCULAR; INTRAVENOUS at 15:36

## 2022-06-06 RX ADMIN — PANTOPRAZOLE SODIUM 20 MG: 20 TABLET, DELAYED RELEASE ORAL at 17:58

## 2022-06-06 ASSESSMENT — ACTIVITIES OF DAILY LIVING (ADL)
ADLS_ACUITY_SCORE: 20
FALL_HISTORY_WITHIN_LAST_SIX_MONTHS: YES
DIFFICULTY_COMMUNICATING: NO
ADLS_ACUITY_SCORE: 20
DOING_ERRANDS_INDEPENDENTLY_DIFFICULTY: NO
ADLS_ACUITY_SCORE: 37
ADLS_ACUITY_SCORE: 37
DRESSING/BATHING_DIFFICULTY: NO
CONCENTRATING,_REMEMBERING_OR_MAKING_DECISIONS_DIFFICULTY: NO
ADLS_ACUITY_SCORE: 20
ADLS_ACUITY_SCORE: 37
ADLS_ACUITY_SCORE: 37
ADLS_ACUITY_SCORE: 20
ADLS_ACUITY_SCORE: 37
WALKING_OR_CLIMBING_STAIRS_DIFFICULTY: NO
DIFFICULTY_EATING/SWALLOWING: NO
TOILETING_ISSUES: NO
ADLS_ACUITY_SCORE: 20
ADLS_ACUITY_SCORE: 20
CHANGE_IN_FUNCTIONAL_STATUS_SINCE_ONSET_OF_CURRENT_ILLNESS/INJURY: NO
ADLS_ACUITY_SCORE: 20
HEARING_DIFFICULTY_OR_DEAF: NO
WEAR_GLASSES_OR_BLIND: NO

## 2022-06-06 ASSESSMENT — ENCOUNTER SYMPTOMS
BLOOD IN STOOL: 0
COUGH: 0
DIARRHEA: 0

## 2022-06-06 NOTE — ED NOTES
"Woke pt up at MD request to place NG tube. Provided pt with zofran and dilaudid prior to placing tube and explained procedure to patient. Pt agreeable and verbalized understanding. Attempted to place NG in L nostril. Pt grabbed at my arm while trying to advance NG tube. Unsuccessful attempt. Re-explained the process to place NG tube and explained that it may be uncomfortable to place but it will help his stomach to feel better. Pt allowed me to try again in the other nostril. Pt slapped this RN on the arm and pushed me away with second attempt. Pt agitated and states \"I do not want the NG tube, I don't have anything in my stomach and it doesn't hurt anyway.\" Pt unwilling to receive any additional education and states that he is refusing the NG tube. MD paged.  "

## 2022-06-06 NOTE — CONSULTS
"  Gastroenterology Consultation      Date of Admission:  6/5/2022  Reason for Admission: Abdominal pain   Date of Consult  6/6/2022   Requesting Physician:  Santana Hansen*           ASSESSMENT AND RECOMMENDATIONS:   Assessment:  54 year old male with a history of HIV on Biktarvy, HSV+ anal biopsies, recurrent SBO, history of substance abuse, GERD, HLD, diagnostic laparoscopy x2 (2016, 2018), lap appendectomy c/b adhesions (2018) and poorly controlled DM2, history of COVID 1/2022, who presents to the ED with abdominal pain. CT scan findings concerning for GOO for which GI has been consulted.     # Abdominal pain  # Gastric distention concerning for GOO  - Patient presents with nausea, vomiting, diffuse abdominal pain  - CT A/P on admission with \"Marked fluid distention of the stomach. This extends up to an area of localized narrowing in the distal aspect of the stomach and the first portion of the duodenum. Possible wall thickening is present at this location which could be due to ulceration or inflammatory change.\"  - General surgery consulted, recommended NGT but patient declined. Recommending GI consult for EGD  - Possible etiology: PUD, polyp, malignancy.   - The area of narrowing and wall thickening is different than previous CT scan from admission 5/25. Will proceed with EGD today to further identify etiology of gastric distention concerning for GOO.     # Diarrhea  - Reports 7 episodes of watery diarrhea PTA  - Cdiff and enteric pathogens ordered, but patient has not had bowel movement since arrival    # History of recurrent SBO  - Patient with almost monthly admissions for recurrent bowel obstructions. Prior appendectomy, ex lap with lysis of adhesions (2016, 2018)  - Most recent admission 5/25-5/29 for abdominal pain with recurrent SBO which resolved with gastrograffin and bowel rest. CT scan at that time with partial mechanical small bowel obstruction involving the proximal to mid jejunum with " "transition site seen in the lower right abdomen.   - He has had several work up over the years including small bowel enteroscopy and colonoscopy in 2016 which were both unrevealing, several cross sectional imaging and MRE which have mostly been normal.   - Per past GI notes \"Most plausibly his chronic obstructive episodes are 2/2 to a past HIV related enteritis (histoplasmosis or otherwise) that has resolved and left him with multiple segment of SB stricturing. Surgery did not find extensive adhesions on dx laparoscopy in 2018. MR enterography in 2019 similarly did not show active enteritis. No evidence to suggest lymphoma, infection, Whipple's or extremely poor motility as culprit.\"     Recommendations:  - Continue NPO status   - EGD today  - Continue PPI BID   - Analgesia/Antiemetics per primary team  - Await stool studies  - Consider MRE       COVID status negative    Gastroenterology follow up recommendations: TBD pending inpatient course    Thank you for involving us in this patient's care. Please do not hesitate to contact the GI service with any questions or concerns.     Pt seen and care plan discussed with Dr. Benítez, GI staff physician.      Overall time spent discussing, thinking, reviewing the chart (including available notes, clinical status events, imaging and labs) and evaluating this patient who is hospitalized for multiple complex medical problems, was 90 minutes, of which greater than 50% of the time was spent on counseling and education (explaining treatment options, disease processes, laboratory/imaging results, prognosis, risks and benefits of treatment options, medication side effects, importance of compliance with treatment, risk factor reduction, follow up with primary care physician), and coordination of care.       Gris Saeed PA-C  GI Service  [facility]  Text " Page  -------------------------------------------------------------------------------------------------------------------       Reason for Consultation:   We were asked by Santana Hansen* of medicine to evaluate this patient with abdominal distention concerning for GOO     History is obtained from the patient and the medical record.            History of Present Illness:     Andrew Lockwood is a 54 year old male with a PMH significant for HIV on Biktarvy, HSV+ anal biopsies, recurrent SBO, history of substance abuse, GERD, HLD, diagnostic laparoscopy x2 (2016, 2018), lap appendectomy c/b adhesions (2018) and poorly controlled DM2, history of COVID 1/2022, who presents to the ED with abdominal pain. CT scan findings concerning for GOO for which GI has been consulted.     He reports that yesterday he developed generalized abdominal pain associated with nausea, vomiting, diarrhea. He reports vomiting bilious vomit 11-12 times and having around 7 episodes of watery diarrhea without black or bloody stools. Both of these episodes were prior to coming to the ED and have not recurred since being here. He reports his pain and symptoms are similar to prior small bowel obstructions. He has now not passed gas or had a bowel movement since yesterday ,morning. He reports tolerating his diet without difficulty or pain swallowing prior to yesterday. He denies symptoms of early satiety, weight loss, fevers, chills, chest pain. He states he has not taken laxatives in almost a month. He smokes 7 cigarettes a day, last used meth 2 weeks ago, and has been sober from alcohol since 2007. He denies taking NSAIDs. He reports that his pain has improved after pain medication.     Of note, patient was admitted 5/25-5/29 for abdominal pain with recurrent SBO which resolved with gastrograffin and bowel rest. He reports that he was supposed to be given Protonix when he left the hospital on 5/29 but was never given it.       Previous  Procedures:  Colonoscopy on 1/8/2016  Impression:          - The entire examined colon is normal. Biopsied to                        evaluate for microscopic colitis.                        - The examined portion of the ileum was normal.                        - Based on this exam, no etiology of small bowel                        thickening was noted. We were unable to reach the area                        of concern. If further evaluation needed, would consider                        single balloon enteroscopy, versus capsule endoscopy,                        versus repeat imaging to see if abnormality persists (as                        already improving on CT).     Small bowel enteroscopy on 1/18/16  Impression:          - Z-line regular, 35 cm from the incisors.                        - LA Grade A reflux esophagitis.                        - White plaques mucosa in the esophagus. Biopsied.                        - Striped and patchy moderately erythematous mucosa                        without bleeding was found in the gastric body and in                        the gastric antrum. Some areas appeared with more                        discrete beefy red erythema. There were a few scattered                        small erosions. Biopsied.                        - Normal examined duodenum. Biopsied.                        - The examined portion of the jejunum was normal.                        Biopsied.                        - Many biopsies were taken. Please see details of                        biopsies above under findings             Past Medical History:   Reviewed and edited as appropriate  Past Medical History:   Diagnosis Date     AIDS (H)      Allergic rhinitis due to other allergen     DNS     Anal dysplasia      Chronic abdominal pain      CNS toxoplasmosis (H)      Diabetes type 2, controlled (H)      GERD (gastroesophageal reflux disease)      History of seizure      History of substance abuse (H)       HIV (human immunodeficiency virus infection) (H)      HLD (hyperlipidemia)      Lung nodules      Periungual wart      PTSD (post-traumatic stress disorder)      Sleep apnea     doesn't use CPAP            Past Surgical History:   Reviewed and edited as appropriate   Past Surgical History:   Procedure Laterality Date     ANOSCOPY N/A 9/9/2020    Procedure: Exam Under Anesthesia, ANOSCOPY, fulgeration of rectal fissures with Rectal Biopsies;  Surgeon: Thanh Lundberg MD;  Location: [facility] OR     COLONOSCOPY Left 1/22/2016    Procedure: COMBINED COLONOSCOPY, SINGLE OR MULTIPLE BIOPSY/POLYPECTOMY BY BIOPSY;  Surgeon: Clark Saini MD;  Location: [facility] GI     HC EXPLORE UNDESC TESTIS,INGUIN/SCROTAL       LAPAROSCOPIC APPENDECTOMY N/A 1/31/2018    Procedure: LAPAROSCOPIC APPENDECTOMY;  LAPAROSCOPIC APPENDECTOMY;  Surgeon: Dawn Holt MD;  Location: [facility] OR     LAPAROSCOPY DIAGNOSTIC (GENERAL) N/A 7/26/2016    Procedure: LAPAROSCOPY DIAGNOSTIC (GENERAL);  Surgeon: Susannah Arriaga MD;  Location: [facility] OR     LAPAROSCOPY DIAGNOSTIC (GENERAL) N/A 4/16/2018    Procedure: LAPAROSCOPY DIAGNOSTIC (GENERAL);  Diagnostic laparoscopy and lysis of adhesions;  Surgeon: Prince Dowling MD;  Location: [facility] OR     OPTICAL TRACKING SYSTEM CRANIOTOMY, EXCISE TUMOR, COMBINED Left 4/10/2015    Procedure: COMBINED OPTICAL TRACKING SYSTEM CRANIOTOMY, EXCISE TUMOR;  Surgeon: Mirlande Colmenares MD;  Location: [facility] OR     REPAIR GAMEKEEPER'S THUMB Right 12/2/2016    Procedure: REPAIR LIGAMENT ULNAR COLLATERAL THUMB (GAMEKEEPER'S);  Surgeon: Evin Zamorano MD;  Location: [facility] OR     ZZC NONSPECIFIC PROCEDURE      right forearm fracture              Social History:   Reviewed and edited as appropriate  Social History     Socioeconomic History     Marital status: Single     Spouse name: Not on file     Number of children: Not on file     Years of education: Not on file     Highest education level: Not on file    Occupational History     Occupation:      Comment: 5 years; temp agency     Occupation: Unemployed   Tobacco Use     Smoking status: Current Every Day Smoker     Packs/day: 0.25     Years: 40.00     Pack years: 10.00     Types: Cigarettes     Smokeless tobacco: Former User   Substance and Sexual Activity     Alcohol use: No     Alcohol/week: 0.0 standard drinks     Comment: Last etoh in 2007     Drug use: Not Currently     Types: Marijuana, Methamphetamines     Sexual activity: Not Currently     Partners: Female, Male     Comment: Last sexual activity 2008   Other Topics Concern     Parent/sibling w/ CABG, MI or angioplasty before 65F 55M? Not Asked   Social History Narrative    Born in [facility].  Came to the [place] in 1993.  Last traveled to visit family in 2008.        1/7/2019 - Homeless. Working with a  at [employer] trying to get housing. Sexually active with two partners recently using condoms 100% of the time. Smokes occasionally, not for the last 4 weeks.        1/7/2020 at [facility] since 1/1/2020. 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  Care established with ID and endocrine     Social Determinants of Health     Financial Resource Strain: Not on file   Food Insecurity: Not on file   Transportation Needs: Not on file   Physical Activity: Not on file   Stress: Not on file   Social Connections: Not on file   Intimate Partner Violence: Not on file   Housing Stability: Not on file              Family History:   Patient's family history is reviewed today and is non-contributory  Family History   Problem Relation Age of Onset     Diabetes Brother      Diabetes Father      Alzheimer Disease Father      Unknown/Adopted Mother      Diabetes Paternal Grandfather      Cancer No family hx of         no skin cancer     Skin Cancer No family hx of         no famiy hx of skin cancer     Glaucoma No family hx of      Macular Degeneration No family hx of         No known history of colorectal cancer,  "liver disease, or inflammatory bowel disease          Allergies:   Reviewed and edited as appropriate     Allergies   Allergen Reactions     Metformin      Abdominal pain     Tylenol [Acetaminophen] Itching     Dulaglutide Rash     Insulin Lispro Rash     Patient reported     Penicillin V Other (See Comments) and Rash     Diffuse maculopapular rash + feels \"high\", per pt.             Medications:     Prior to Admission Medications   Prescriptions Last Dose Informant Patient Reported? Taking?   Alcohol Swabs PADS   Self No No   Sig: Use to swab the area of the injection or tdod as directed Per insurance coverage   STATIN NOT PRESCRIBED (INTENTIONAL)   Self No No   Sig: Please choose reason not prescribed, below   Sharps Container MISC   Self No No   Sig: Use as directed to dispose of needles, lancets and other sharps. Per Insurance coverage   bictegravir-emtricitabine-tenofovir (BIKTARVY) -25 MG per tablet     No No   Sig: Take 1 tablet by mouth daily   blood glucose (NO BRAND SPECIFIED) test strip   Self No No   Sig: Use to test blood sugar 4 times daily or as directed. To accompany:whatever is covered   blood glucose calibration (NO BRAND SPECIFIED) solution   Self No No   Sig: Used to calibrate the blood glucose monitor as needed and as directed.  To accompany  blood glucose brands per insurance coverage   blood glucose monitoring (NO BRAND SPECIFIED) meter device kit   Self No No   Sig: Use as directed. Per insurance coverage.   buPROPion (WELLBUTRIN XL) 300 MG 24 hr tablet     No No   Sig: Take 1 tablet (300 mg) by mouth daily   empagliflozin (JARDIANCE) 25 MG TABS tablet     No No   Sig: Take 1 tablet (25 mg) by mouth daily   insulin pen needle (BD SCOTT U/F) 32G X 4 MM miscellaneous     No No   Sig: USE 3 TO 4 NEEDLES DAILY. Follow up visit needed. Call  to schedule.   loperamide (IMODIUM A-D) 2 MG tablet     No No   Sig: Take 1 tablet (2 mg) by mouth 4 times daily as needed for diarrhea "   metFORMIN (GLUCOPHAGE XR) 500 MG 24 hr tablet     No No   Sig: Take 1 tablet (500 mg) by mouth daily (with dinner)   pantoprazole (PROTONIX) 40 MG EC tablet   Self No No   Sig: Take 1 tablet (40 mg) by mouth daily   polyethylene glycol (MIRALAX) 17 GM/Dose powder     No No   Sig: Take 17 g by mouth daily   simethicone (MYLICON) 80 MG chewable tablet     No No   Sig: Take 1 tablet (80 mg) by mouth every 6 hours as needed for cramping   thin (NO BRAND SPECIFIED) lancets   Self No No   Sig: Use with lanceting device. To accompany: Test 4 times daily with whatever is covered      Facility-Administered Medications: None            Review of Systems:     A complete review of systems was performed and is negative except as noted in the HPI             Physical Exam:   Temp: 98.3  F (36.8  C) Temp src: Oral BP: 94/62 (side lying) Pulse: 85   Resp: 14 SpO2: 97 % O2 Device: None (Room air)    Wt:   Wt Readings from Last 2 Encounters:   06/05/22 70.3 kg (155 lb)   05/28/22 73.4 kg (161 lb 12.8 oz)        General: 54 year old male in NAD.  Answers appropriately.    HEENT: Head is AT/NC. Patient kept his eyes closed the entire interview.  Oropharynx is clear, moist   Neck: Supple  Lungs: Non labored breathing on room air   Heart: Regular rate and rhythm   Abdomen: Soft, non-tender, non-distended.  BS +.  No hepatosplenomegaly. No rebound or peritoneal signs  Extremities: No pedal edema.  Heme/Lymph: No cervical adenopathy.   Skin: No jaundice or rash  Neurologic: Grossly non-focal            Data:   Labs and imaging below were independently reviewed and interpreted    LAB WORK:    BMP  Recent Labs   Lab 06/06/22 0623 06/05/22 2201    138   POTASSIUM 3.6 3.6   CHLORIDE 108 104   LINDA 8.6 9.6   CO2 24 29   BUN 14 18   CR 0.64* 0.84   * 196*     CBC  Recent Labs   Lab 06/06/22 0623 06/05/22 2201   WBC 6.7 10.0   RBC 4.38* 5.12   HGB 11.7* 13.7   HCT 36.6* 42.2   MCV 84 82   MCH 26.7 26.8   MCHC 32.0 32.5   RDW  14.6 14.3    346     INR  Recent Labs   Lab 06/05/22 2201   INR 1.44*     LFTs  Recent Labs   Lab 06/05/22 2201   ALKPHOS 105   AST 18   ALT 27   BILITOTAL 0.8   PROTTOTAL 7.9   ALBUMIN 3.8      PANC  Recent Labs   Lab 06/05/22 2201   LIPASE 120       IMAGING:  CT A/P on 6/5/2022  IMPRESSION:   1.  Marked fluid distention of the stomach. This extends up to an area of localized narrowing in the distal aspect of the stomach and the first portion of the duodenum. Possible wall thickening is present at this location which could be due to ulceration   or inflammatory change. Recommend correlation for epigastric abdominal pain and consideration of EGD.        =======================================================================

## 2022-06-06 NOTE — PROGRESS NOTES
Textpage to provider--->Obs 4 M.P. Patient back from EGD, no blockages per report.  Pt would like to know if he can eat.  Thanks.

## 2022-06-06 NOTE — PROGRESS NOTES
"Brief Surgery Progress Note  06/06/2022       Subjective:  Patient reports improved pain this AM with dilaudid. No emesis or flatus. Continues to decline NG tube placement.     Objective:  Temp:  [98.3  F (36.8  C)] 98.3  F (36.8  C)  Pulse:  [] 85  Resp:  [14-19] 14  BP: ()/(62-91) 94/62  SpO2:  [96 %-100 %] 97 %    No intake/output data recorded.      Gen: Awake, alert, NAD, non-toxic appearing  Resp: NLB on RA  CV: Regular rate and rhythm  Abd: soft, nondistended, nontender  Ext: WWP, no edema     Labs:  Recent Labs   Lab 06/06/22 0623 06/05/22  2201   WBC 6.7 10.0   HGB 11.7* 13.7    346     Recent Labs   Lab 06/06/22 0623 06/05/22  2201    138   POTASSIUM 3.6 3.6   CHLORIDE 108 104   CO2 24 29   BUN 14 18   CR 0.64* 0.84   * 196*   LINDA 8.6 9.6     Imaging:  CT Abdomen Pelvis with contrast 6/5/22    \"IMPRESSION:   1.  Marked fluid distention of the stomach. This extends up to an area of localized narrowing in the distal aspect of the stomach and the first portion of the duodenum. Possible wall thickening is present at this location which could be due to ulceration or inflammatory change. Recommend correlation for epigastric abdominal pain and consideration of EGD.  2.  No small bowel obstruction.\"     Assessment/Plan:   54 year old male with history of HIV/AIDS on HAART, DM2 on insulin, CNS toxoplasmosis, chronic constipation, recurrent small bowel obstructions s/p laparoscopic ANASTASIIA (2018), well known to the general surgery service given hx of SBO, who presented with abdominal pain and vomiting, VSS, labs reassuring, CT with concern for gastric outlet obstruction. Pain currently well-controlled with dilaudid, patient has repeatedly declined NG tube.    - GI consult placed for upper endoscopy, currently NPO  - Continue glucose monitoring with correction insulin aspart     Seen, examined, and discussed with chief resident, who will discuss with staff.  - - - - - - - - - - - - - - - " - - -  Jess Noonan, MS4  [facility] Medical Student    Pt seen and examined with Student Dr. Noonan. I agree with the documentation above as edited by me. The exam, assessment, and plan are my own.    Vic Santoyo MD  General Surgery PGY-1

## 2022-06-06 NOTE — CONSULTS
EGS Surgery Consult  2022    Andrew Lockwood  : 1967    Date of Service: 2022 4:14 AM    Assessment and Plan:  Andrew Lockwood is a 54 year old male with history of HIV/AIDS on HAART, DM2 on insulin, CNS toxoplasmosis, chronic constipation, recurrent small bowel obstructions s/p laparoscopic ANASTASIIA (2018), well known to the general surgery service given hx of SBO, who presented with abdominal pain and vomiting, VSS, labs reassuring, CT with concern for gastric outlet obstruction.     - Recommend placing NGT for gastric decompression  - GI consult for upper endoscopy, to further identify etiology of gastric distention c/f GOO, as previously noted on recent imaging as well  - Tight glucose control to minimize possible contributing factors    Discussed with chief resident, and staff, Dr. Trey Duarte MD  PGY-2 General Surgery     History of Present Illness:    Andrew Lockwood is a 54 year old male with history of HIV/AIDS on HAART, DM2 on insulin, CNS toxoplasmosis, chronic constipation, recurrent small bowel obstructions s/p laparoscopic ANASTASIIA (2018), well known to the general surgery service given hx of SBO. These started in 2016, he underwent lap appy, and has several recurrent episodes since, presumed to be related to adhesive disease, as he then required laparoscopy with ANASTASIIA in 2018. He has a history of chronic constipation and has been worked up for adynamic ileus, and has not had follow up MRE due to housing instability. His most recent admission was - with partial SBO, methamphetamine use and DM2. This resolved with gastrograffin and bowel rest. DM is poorly controlled on current regimen, sugars typically 200s at time of hospitalizations.     Patient presented to the ED yesterday with recurrent abdominal pain similar to prior SBOs. Pain is diffuse but worse on the left, no flatus or BM since yesterday morning. Had been having diarrhea which has let up now, stating this typically  happens when he has an obstruction. Has had some nausea with bilious emesis, denies any blood. No fevers, chills. Now after receiving pain medication in the ED states he feels fine. No longer feeling bloated or nauseous. Unsure why he needs to stay in the hospital, declines NGT, but is agreeable to additional workup if needed.    ED workup with reassuring vitals, labs unremarkable except glucose 196, CT a/p showing marked dilation of the stomach with narrowing near the pylorus, concerning for gastric outlet obstruction.     Past Medical History:  Past Medical History:   Diagnosis Date     AIDS (H)      Allergic rhinitis due to other allergen     DNS     Anal dysplasia      Chronic abdominal pain      CNS toxoplasmosis (H)      Diabetes type 2, controlled (H)      GERD (gastroesophageal reflux disease)      History of seizure      History of substance abuse (H)      HIV (human immunodeficiency virus infection) (H)      HLD (hyperlipidemia)      Lung nodules      Periungual wart      PTSD (post-traumatic stress disorder)      Sleep apnea     doesn't use CPAP       Past Surgical History  Past Surgical History:   Procedure Laterality Date     ANOSCOPY N/A 9/9/2020    Procedure: Exam Under Anesthesia, ANOSCOPY, fulgeration of rectal fissures with Rectal Biopsies;  Surgeon: Thanh Lundberg MD;  [facility]     COLONOSCOPY Left 1/22/2016    Procedure: COMBINED COLONOSCOPY, SINGLE OR MULTIPLE BIOPSY/POLYPECTOMY BY BIOPSY;  Surgeon: Clark Saini MD;  Location: [facility] GI     HC EXPLORE UNDESC TESTIS,INGUIN/SCROTAL       LAPAROSCOPIC APPENDECTOMY N/A 1/31/2018    Procedure: LAPAROSCOPIC APPENDECTOMY;  LAPAROSCOPIC APPENDECTOMY;  Surgeon: Dawn Holt MD;  [facility]     LAPAROSCOPY DIAGNOSTIC (GENERAL) N/A 7/26/2016    Procedure: LAPAROSCOPY DIAGNOSTIC (GENERAL);  Surgeon: Susannah Arriaga MD;  [facility]     LAPAROSCOPY DIAGNOSTIC (GENERAL) N/A 4/16/2018    Procedure: LAPAROSCOPY  DIAGNOSTIC (GENERAL);  Diagnostic laparoscopy and lysis of adhesions;  Surgeon: Prince Dowling MD;  [facility]     OPTICAL TRACKING SYSTEM CRANIOTOMY, EXCISE TUMOR, COMBINED Left 4/10/2015    Procedure: COMBINED OPTICAL TRACKING SYSTEM CRANIOTOMY, EXCISE TUMOR;  Surgeon: Mirlande Colmenares MD;  [facility]     REPAIR GAMEKEEPER'S THUMB Right 12/2/2016    Procedure: REPAIR LIGAMENT ULNAR COLLATERAL THUMB (GAMEKEEPER'S);  Surgeon: Evin Zamorano MD;  [facility]     ZZC NONSPECIFIC PROCEDURE      right forearm fracture       Family History:  Family History   Problem Relation Age of Onset     Diabetes Brother      Diabetes Father      Alzheimer Disease Father      Unknown/Adopted Mother      Diabetes Paternal Grandfather      Cancer No family hx of         no skin cancer     Skin Cancer No family hx of         no famiy hx of skin cancer     Glaucoma No family hx of      Macular Degeneration No family hx of        Social History:  Social History     Socioeconomic History     Marital status: Single     Spouse name: Not on file     Number of children: Not on file     Years of education: Not on file     Highest education level: Not on file   Occupational History     Occupation:      Comment: 5 years; temp agency     Occupation: Unemployed   Tobacco Use     Smoking status: Current Every Day Smoker     Packs/day: 0.25     Years: 40.00     Pack years: 10.00     Types: Cigarettes     Smokeless tobacco: Former User   Substance and Sexual Activity     Alcohol use: No     Alcohol/week: 0.0 standard drinks     Comment: Last etoh in 2007     Drug use: Not Currently     Types: Marijuana, Methamphetamines     Sexual activity: Not Currently     Partners: Female, Male     Comment: Last sexual activity 2008   Other Topics Concern     Parent/sibling w/ CABG, MI or angioplasty before 65F 55M? Not Asked   Social History Narrative    Born in [facility] [place].  Came to the [place] in 1993.  Last traveled  to visit family in 2008.        1/7/2019 - Homeless. Working with a  at [employer] [employer] trying to get housing. Sexually active with two partners recently using condoms 100% of the time. Smokes occasionally, not for the last 4 weeks.        [facility] 1/1/[facility]. 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  Care established with ID and endocrine     Social Determinants of Health     Financial Resource Strain: Not on file   Food Insecurity: Not on file   Transportation Needs: Not on file   Physical Activity: Not on file   Stress: Not on file   Social Connections: Not on file   Intimate Partner Violence: Not on file   Housing Stability: Not on file       Medications:  Current Outpatient Medications   Medication Sig Dispense Refill     Alcohol Swabs PADS Use to swab the area of the injection or todd as directed Per insurance coverage 100 each 0     bictegravir-emtricitabine-tenofovir (BIKTARVY) -25 MG per tablet Take 1 tablet by mouth daily 30 tablet 0     blood glucose (NO BRAND SPECIFIED) test strip Use to test blood sugar 4 times daily or as directed. To accompany:whatever is covered 360 strip 3     blood glucose calibration (NO BRAND SPECIFIED) solution Used to calibrate the blood glucose monitor as needed and as directed.  To accompany  blood glucose brands per insurance coverage 1 each 0     blood glucose monitoring (NO BRAND SPECIFIED) meter device kit Use as directed. Per insurance coverage. 1 kit 0     buPROPion (WELLBUTRIN XL) 300 MG 24 hr tablet Take 1 tablet (300 mg) by mouth daily 30 tablet 0     empagliflozin (JARDIANCE) 25 MG TABS tablet Take 1 tablet (25 mg) by mouth daily 30 tablet 0     insulin pen needle (BD SCOTT U/F) 32G X 4 MM miscellaneous USE 3 TO 4 NEEDLES DAILY. Follow up visit needed. Call  to schedule. 400 each 0     loperamide (IMODIUM A-D) 2 MG tablet Take 1 tablet (2 mg) by mouth 4 times daily as needed for diarrhea 12 tablet 0     metFORMIN (GLUCOPHAGE XR) 500 MG 24  "hr tablet Take 1 tablet (500 mg) by mouth daily (with dinner) 30 tablet 0     pantoprazole (PROTONIX) 40 MG EC tablet Take 1 tablet (40 mg) by mouth daily 30 tablet 6     polyethylene glycol (MIRALAX) 17 GM/Dose powder Take 17 g by mouth daily 510 g 3     Sharps Container MISC Use as directed to dispose of needles, lancets and other sharps. Per Insurance coverage 1 each 0     simethicone (MYLICON) 80 MG chewable tablet Take 1 tablet (80 mg) by mouth every 6 hours as needed for cramping 90 tablet 0     STATIN NOT PRESCRIBED (INTENTIONAL) Please choose reason not prescribed, below       thin (NO BRAND SPECIFIED) lancets Use with lanceting device. To accompany: Test 4 times daily with whatever is covered 360 each 3       Allergies:     Allergies   Allergen Reactions     Metformin      Abdominal pain     Tylenol [Acetaminophen] Itching     Dulaglutide Rash     Insulin Lispro Rash     Patient reported     Penicillin V Other (See Comments) and Rash     Diffuse maculopapular rash + feels \"high\", per pt.        Review of Symptoms:  A 10 point review of symptoms has been conducted and is negative except for that mentioned in the above HPI.    Physical Exam:  Blood pressure 116/87, pulse 88, temperature 98.3  F (36.8  C), temperature source Oral, resp. rate 16, height 1.626 m (5' 4\"), weight 70.3 kg (155 lb), SpO2 99 %.  Gen:    Lying in bed in NAD, watching TV, conversant  HEENT: Normocephalic and atraumatic  CV:  RRR  Pulm:  Non-labored breathing on room air  Abd:  Soft, non-distended, non-tender to palpation, no rebound or guarding  Ext:  Warm and well perfused, no obvious deformities    Labs:  CBC RESULTS:   Recent Labs   Lab Test 06/05/22  2201   WBC 10.0   RBC 5.12   HGB 13.7   HCT 42.2   MCV 82   MCH 26.8   MCHC 32.5   RDW 14.3        Last Basic Metabolic Panel:  Lab Results   Component Value Date     06/05/2022     06/24/2021      Lab Results   Component Value Date    POTASSIUM 3.6 06/05/2022    " POTASSIUM 2.5 12/12/2021    POTASSIUM 3.2 06/24/2021     Lab Results   Component Value Date    CHLORIDE 104 06/05/2022    CHLORIDE 107 06/24/2021     Lab Results   Component Value Date    LINDA 9.6 06/05/2022    LINDA 7.3 06/24/2021     Lab Results   Component Value Date    CO2 29 06/05/2022    CO2 20 06/24/2021     Lab Results   Component Value Date    BUN 18 06/05/2022    BUN 18 06/24/2021     Lab Results   Component Value Date    CR 0.84 06/05/2022    CR 0.81 06/24/2021     Lab Results   Component Value Date     06/05/2022     06/24/2021       Imaging:  EXAM: CT ABDOMEN PELVIS W CONTRAST  LOCATION: [facility]  DATE/TIME: 6/5/2022 11:00 PM     INDICATION: Nonlocalized abdominal pain with history of small bowel obstruction. Evaluate for recurrence.  COMPARISON: Multiple priors, most recent 05/25/2022, 05/20/2022 and dating back to chest CT 07/08/2020.  TECHNIQUE: CT scan of the abdomen and pelvis was performed following injection of IV contrast. Multiplanar reformats were obtained. Dose reduction techniques were used.  CONTRAST: Iopamidol (Isovue 370) solution 95 mL.     FINDINGS:   LOWER CHEST: Unchanged 7 mm pulmonary nodule involving the central aspect of the right middle lobe (5-1). Minimal linear atelectasis in the lung bases. No pleural fluid.     HEPATOBILIARY: No calcified gallstones or biliary dilatation. Hepatic steatosis. Low-attenuation subcentimeter liver lesion(s) compatible with benign cysts or other benign lesions. No specific evaluation or follow-up is recommended in a low risk   patient..     PANCREAS: Normal.     SPLEEN: Normal.     ADRENAL GLANDS: Normal.     KIDNEYS/BLADDER: Symmetric renal enhancement. Low-attenuation subcentimeter renal lesion(s). These are compatible with small benign cysts and no specific imaging evaluation or follow-up is recommended. No urinary collecting system dilatation or calculi.   Bladder  unremarkable.     BOWEL: Marked fluid distention in the stomach. This extends down to an area of localized narrowing of the distal aspect of the stomach in the first portion of the duodenum (series 5, image 136). Second and third portions of the duodenum as well as the   fourth portion decompressed. No small bowel dilatation or inflammatory change. Colonic diverticulosis.     LYMPH NODES: No lymphadenopathy.     VASCULATURE: Normal caliber abdominal aorta. Minimal vascular calcification.     PELVIC ORGANS: [place] anterior to the bladder in the left aspect of the pelvis. Prosthetic device within the penis.     MUSCULOSKELETAL: Fat-containing umbilical hernia, minimal. No overt osseous abnormality.                                                                      IMPRESSION:   1.  Marked fluid distention of the stomach. This extends up to an area of localized narrowing in the distal aspect of the stomach and the first portion of the duodenum. Possible wall thickening is present at this location which could be due to ulceration   or inflammatory change. Recommend correlation for epigastric abdominal pain and consideration of EGD.     2.  No small bowel obstruction.

## 2022-06-06 NOTE — H&P
[facility]    History and Physical - Medicine Service, MARZ[facility] TEAM        Date of Admission:  6/6/2022    Assessment & Plan    Andrew Lockwood is a 54 year old male with a past medical history of HIV on Biktarvy, poorly controlled diabetes mellitus type 2, GERD, CNS toxoplasmosis, hyperlipidemia, past COVID-19 infection (01/12/2022), s/p appendectomy, ex lap w/ lysis of adhesions (2016, 2018), recurrent SBOs with several recent admissions for recurrent SBOs managed conservatively, admitted 6/6/22 with recurrent abdominal pain and CT findings consistent with new gastric outlet obstruction.    #Gastric outlet obstruction  #Abdominal pain, Nausea/vomiting  Area of focal narrowing in distal stomach and first portion of duodenum on CT scan that could represent ulceration, inflammation or mass. Patient without signs of associated GI bleeding. No evidence of small bowel obstruction, lipase normal.   - Surgery consulted, appreciate recommendations  - GI consulted for possible endoscopy, appreciate recs  - NPO  - NG decompression  - Pain management: acetaminophen 650mg q4hr PRN, IV dilaudid 0.5mg q2hr PRN  - Nausea: zofran IV 4mg q6hr PRN    # Episodes of loose stools  # Possible hx of SIBO  7 watery stools on 6/5. C diff and stool pathogen panel ordered by emergency room.   - Continue to monitor  - F/u stool studies     # Poorly controlled type II DM  Last A1c 11.2 in 02/2022. Home meds include jardiance 25 mg, metformin 500mg daily - reports adherence. Not taking insulin, though previously prescribed.   - MISS q4h while NPO  - hypoglycemia protocol      # Substance use disorder  # History of housing instability  Several years of smoking meth history, reports last use two weeks ago. Denies other substance use including alcohol. Was recently prescribed wellbutrin but reports not taking.    - Consider addiction medicine consultation if interested      Chronic  Conditions:   #HIV  Stable on Biktarvy, which patient has brought with him. Last CD4 count 5/24 was 526.  - PTA Biktarvy     #GERD  - holding PTA protonix 40 daily while NPO for NG decompression        Diet: NPO for Medical/Clinical Reasons Except for: Meds    DVT Prophylaxis: Pneumatic Compression Devices  Potts Catheter: Not present  Fluids: 125cc.hr LR x1 day   Central Lines: None  Cardiac Monitoring: None  Code Status: Full Code      Disposition Plan   Expected Discharge: >2 days once gastric outlet obstruction evaluated and symptoms managed   Anticipated discharge location:  Awaiting care coordination huddle    Patient to be staffed in the AM.     Christine Regan MD PGY1  Medicine Service, [facility]  Securely message with the Vocera Web Console (learn more here)  Text page via [facility] Paging/Directory   Please see signed in provider for up to date coverage information    ______________________________________________________________________    Chief Complaint   Abdominal pain     History is obtained from the patient    History of Present Illness   Andrew Lockwood is a 54 year old male with a history of HIV on Biktarvy, poorly controlled T2DM, GERD, h/o CNS toxoplasmosis, HLD, s/p appendectomy, ex lap w/ lysis of adhesions (2016, 2018), recurrent SBOs with several recent admissions for recurrent SBOs managed conservatively. Most recently admitted 5/25-5/29 for partial SBO. Reports complete resolution of abdominal pain after discharge and the pain started again on 6/5 PM after nausea/vomiting and describes it as a sharp left upper abdominal pain and significant reflux sensation. Also had one day of watery diarrhea that is brown without melena or hematochezia.     Currently pain is improved after IV dilaudid in the ED and nausea has subsided.  Denies fevers/chills, chest pain, shortness of breath, dysuria or increased urinary frequency, dizziness, headache or  other concerns. Reports last smoking meth two weeks ago but denies any recent alcohol or other illicit substance use.     ED Course:   VS: AF, 119/91,  improved to 85, RR 14-19, SpO2 >97% on RA  Interventions: IV dilaudid, IV fluids, pantoprazole     Review of Systems    The 10 point Review of Systems is negative other than noted in the HPI    Past Medical History    I have reviewed this patient's medical history and updated it with pertinent information if needed.   Past Medical History:   Diagnosis Date     AIDS (H)      Allergic rhinitis due to other allergen     DNS     Anal dysplasia      Chronic abdominal pain      CNS toxoplasmosis (H)      Diabetes type 2, controlled (H)      GERD (gastroesophageal reflux disease)      History of seizure      History of substance abuse (H)      HIV (human immunodeficiency virus infection) (H)      HLD (hyperlipidemia)      Lung nodules      Periungual wart      PTSD (post-traumatic stress disorder)      Sleep apnea     doesn't use CPAP        Past Surgical History   I have reviewed this patient's surgical history and updated it with pertinent information if needed.  Past Surgical History:   Procedure Laterality Date     ANOSCOPY N/A 9/9/2020    Procedure: Exam Under Anesthesia, ANOSCOPY, fulgeration of rectal fissures with Rectal Biopsies;  Surgeon: Thanh Lundberg MD;  [facility]     COLONOSCOPY Left 1/22/2016    Procedure: COMBINED COLONOSCOPY, SINGLE OR MULTIPLE BIOPSY/POLYPECTOMY BY BIOPSY;  Surgeon: Clark Saini MD;  [facility]: [facility]     HC EXPLORE UNDESC TESTIS,INGUIN/SCROTAL       LAPAROSCOPIC APPENDECTOMY N/A 1/31/2018    Procedure: LAPAROSCOPIC APPENDECTOMY;  LAPAROSCOPIC APPENDECTOMY;  Surgeon: Dawn Holt MD;  [facility]     LAPAROSCOPY DIAGNOSTIC (GENERAL) N/A 7/26/2016    Procedure: LAPAROSCOPY DIAGNOSTIC (GENERAL);  Surgeon: Susannah Arriaga MD;  [facility]     LAPAROSCOPY DIAGNOSTIC (GENERAL) N/A 4/16/2018     Procedure: LAPAROSCOPY DIAGNOSTIC (GENERAL);  Diagnostic laparoscopy and lysis of adhesions;  Surgeon: Prince Dowling MD;  [facility]: [facility]     OPTICAL TRACKING SYSTEM CRANIOTOMY, EXCISE TUMOR, COMBINED Left 4/10/2015    Procedure: COMBINED OPTICAL TRACKING SYSTEM CRANIOTOMY, EXCISE TUMOR;  Surgeon: Mirlande Colmenares MD;  [facility]: [facility]     REPAIR GAMEKEEPER'S THUMB Right 12/2/2016    Procedure: REPAIR LIGAMENT ULNAR COLLATERAL THUMB (GAMEKEEPER'S);  Surgeon: Evin Zamorano MD;  Location:  OR     [facility] NONSPECIFIC PROCEDURE      right forearm fracture        Social History   Tobacco: smokes about a half pack per day   Alcohol: denies  Illicit substances: smokes meth, last two weeks ago denies other drugs  Living situation: staying in homeless shelter [facility]    Family History   I have reviewed this patient's family history and updated it with pertinent information if needed.  Family History   Problem Relation Age of Onset     Diabetes Brother      Diabetes Father      Alzheimer Disease Father      Unknown/Adopted Mother      Diabetes Paternal Grandfather      Cancer No family hx of         no skin cancer     Skin Cancer No family hx of         no famiy hx of skin cancer     Glaucoma No family hx of      Macular Degeneration No family hx of        Prior to Admission Medications   Prior to Admission Medications   Prescriptions Last Dose Informant Patient Reported? Taking?   Alcohol Swabs PADS  Self No No   Sig: Use to swab the area of the injection or todd as directed Per insurance coverage   STATIN NOT PRESCRIBED (INTENTIONAL)  Self No No   Sig: Please choose reason not prescribed, below   Sharps Container MISC  Self No No   Sig: Use as directed to dispose of needles, lancets and other sharps. Per Insurance coverage   bictegravir-emtricitabine-tenofovir (BIKTARVY) -25 MG per tablet   No No   Sig: Take 1 tablet by mouth daily   blood glucose (NO BRAND SPECIFIED) test strip  Self No No  "  Sig: Use to test blood sugar 4 times daily or as directed. To accompany:whatever is covered   blood glucose calibration (NO BRAND SPECIFIED) solution  Self No No   Sig: Used to calibrate the blood glucose monitor as needed and as directed.  To accompany  blood glucose brands per insurance coverage   blood glucose monitoring (NO BRAND SPECIFIED) meter device kit  Self No No   Sig: Use as directed. Per insurance coverage.   buPROPion (WELLBUTRIN XL) 300 MG 24 hr tablet   No No   Sig: Take 1 tablet (300 mg) by mouth daily   empagliflozin (JARDIANCE) 25 MG TABS tablet   No No   Sig: Take 1 tablet (25 mg) by mouth daily   insulin pen needle (BD SCOTT U/F) 32G X 4 MM miscellaneous   No No   Sig: USE 3 TO 4 NEEDLES DAILY. Follow up visit needed. Call  to schedule.   loperamide (IMODIUM A-D) 2 MG tablet   No No   Sig: Take 1 tablet (2 mg) by mouth 4 times daily as needed for diarrhea   metFORMIN (GLUCOPHAGE XR) 500 MG 24 hr tablet   No No   Sig: Take 1 tablet (500 mg) by mouth daily (with dinner)   pantoprazole (PROTONIX) 40 MG EC tablet  Self No No   Sig: Take 1 tablet (40 mg) by mouth daily   polyethylene glycol (MIRALAX) 17 GM/Dose powder   No No   Sig: Take 17 g by mouth daily   simethicone (MYLICON) 80 MG chewable tablet   No No   Sig: Take 1 tablet (80 mg) by mouth every 6 hours as needed for cramping   thin (NO BRAND SPECIFIED) lancets  Self No No   Sig: Use with lanceting device. To accompany: Test 4 times daily with whatever is covered      Facility-Administered Medications: None     Allergies   Allergies   Allergen Reactions     Metformin      Abdominal pain     Tylenol [Acetaminophen] Itching     Dulaglutide Rash     Insulin Lispro Rash     Patient reported     Penicillin V Other (See Comments) and Rash     Diffuse maculopapular rash + feels \"high\", per pt.        Physical Exam   Vital Signs: Temp: 98.3  F (36.8  C) Temp src: Oral BP: 94/62 (side lying) Pulse: 85   Resp: 14 SpO2: 97 % O2 Device: None " (Room air)    Weight: 155 lbs 0 oz    Constitutional: awake, alert, cooperative, NAD  HEENT: normocephalic, anicteric sclerae, MMM   Pulmonary: no increased work of breathing on room air, lungs clear to auscultation bilaterally without wheezes or rales   Cardiovascular: RRR, normal S1 and S2, no murmur appreciated   GI: BS+, soft, tender with voluntary guarding over epigastrium otherwise non-tender to palpation, no rigidity  Skin: warm, dry  MSK: no peripheral edema bilaterally   Neuro: AAOx3, no gross focal neurologic deficits    Data   Data reviewed today: I reviewed all medications, new labs and imaging results over the last 24 hours.    Recent Labs   Lab 06/05/22  2201   WBC 10.0   HGB 13.7   MCV 82      INR 1.44*      POTASSIUM 3.6   CHLORIDE 104   CO2 29   BUN 18   CR 0.84   ANIONGAP 5   LINDA 9.6   *   ALBUMIN 3.8   PROTTOTAL 7.9   BILITOTAL 0.8   ALKPHOS 105   ALT 27   AST 18   LIPASE 120

## 2022-06-06 NOTE — ED NOTES
I took signout of the patient from Dr. Solomon.  This is a 54-year-old male who presents with upper abdominal pain as well as nausea and vomiting.  Patient was signed out to me pending results of CT abdomen pelvis as well as UA.  CT abdomen pelvis shows marked fluid distention of the stomach as well as narrowing of the distal aspect of the stomach with thickening concerning for ulceration or inflammatory changes.  Patient was given pantoprazole and hydromorphone.  UA is pending at this time. We will admit for further monitoring work-up and treatment.     Hernando De Paz,   06/06/22 0255

## 2022-06-06 NOTE — ED PROVIDER NOTES
ED Provider Note  [facility]      History     Chief Complaint   Patient presents with     Abdominal Pain     Nausea, Vomiting, & Diarrhea     The history is provided by the patient and medical records.     Andrew Lockwood is a 54 year old male who has a PMH notable for DM2, HIV, prior CNS toxoplasmosis, multiple bowel obstruction/partial bowel obstructions, GERD, constipation nausea and vomiting, homelessness, et al., who is presenting today with recurrent abdominal pain he describes as being similar to prior bowel obstructions.    RECENT HISTORY REVIEW  Patient was just admitted to the [facility] service 5/25/2022 - 5/29/2022 (discharged less than a week ago) with partial SBO, methamphetamine use and DM2.  They do note the patient has had recurrent SBO with many prior hospitalizations, with CT being consistent with such with this most recent admission.  Gastrografin study performed on 5/27 showed resolved obstruction, diet was able to be tolerated and by 5/29 he no longer had nausea/vomiting.  They also describe his history of substance use disorder and housing instability.  Last meth use was on 5/24, just prior to that last hospitalization.  The Addiction Medicine team was trying to arrange outpatient appointment, and he was prescribed Wellbutrin on discharge with potential plan to increase dose if agreeable and tolerating.  They also note that he is poorly controlled DM2, despite Jardiance and metformin (which she reported adherence to).  He had previously been prescribed insulin though no longer using.  They utilize SSI while admitted, and then discharged on home regimen.  Plan was to have him follow-up with PCP to see if they should add a third oral regimen. Continues on Biktarvy for HIV, CD4 count 526 on 5/24.     CURRENT PRESENTATION  Patient presents today with recurrent abdominal pain similar to prior bowel obstructions per his report.  Pain just started today, located throughout  "the abdomen but worse in the left side.  He had no change in diet or medications, unsure why this may have occurred.  He reports that he has not been able to have any bowel movements and has not passed flatus.  Denies any urine changes.  No , testicular, scrotal or penile symptoms.  Some nausea with \"bile\" appearing emesis, but no bloody emesis or coffee-ground emesis.  Denies any chest pain or breathing symptoms.  No fevers or chills.  No HEENT symptoms.  No neck or back symptoms.  No other extremity symptoms.  No rashes or skin changes.  He feels tired with because of this pain and the vomiting but no lightheadedness, dizziness, syncope or near syncope.  No other new symptoms or complaints this time.  Please see ROS for further details.    This part of the medical record was transcribed by Cielo Salcedo Medical Scribe, from a dictation done by Ceci Trammell MD.     Past Medical History  Past Medical History:   Diagnosis Date     AIDS (H)      Allergic rhinitis due to other allergen     DNS     Anal dysplasia      Chronic abdominal pain      CNS toxoplasmosis (H)      Diabetes type 2, controlled (H)      GERD (gastroesophageal reflux disease)      History of seizure      History of substance abuse (H)      HIV (human immunodeficiency virus infection) (H)      HLD (hyperlipidemia)      Lung nodules      Periungual wart      PTSD (post-traumatic stress disorder)      Sleep apnea     doesn't use CPAP     Past Surgical History:   Procedure Laterality Date     ANOSCOPY N/A 9/9/2020    Procedure: Exam Under Anesthesia, ANOSCOPY, fulgeration of rectal fissures with Rectal Biopsies;  Surgeon: Thanh Lundberg MD;  Location: [facility] OR     COLONOSCOPY Left 1/22/2016    Procedure: COMBINED COLONOSCOPY, SINGLE OR MULTIPLE BIOPSY/POLYPECTOMY BY BIOPSY;  Surgeon: Clark Saini MD;  Location: [facility] GI     HC EXPLORE UNDESC TESTIS,INGUIN/SCROTAL       LAPAROSCOPIC APPENDECTOMY N/A 1/31/2018    Procedure: LAPAROSCOPIC " "APPENDECTOMY;  LAPAROSCOPIC APPENDECTOMY;  Surgeon: Dawn Holt MD;  Location: [facility] OR     LAPAROSCOPY DIAGNOSTIC (GENERAL) N/A 7/26/2016    Procedure: LAPAROSCOPY DIAGNOSTIC (GENERAL);  Surgeon: Susannah Arriaga MD;  Location: [facility] OR     LAPAROSCOPY DIAGNOSTIC (GENERAL) N/A 4/16/2018    Procedure: LAPAROSCOPY DIAGNOSTIC (GENERAL);  Diagnostic laparoscopy and lysis of adhesions;  Surgeon: Prince Dowling MD;  Location: [facility] OR     OPTICAL TRACKING SYSTEM CRANIOTOMY, EXCISE TUMOR, COMBINED Left 4/10/2015    Procedure: COMBINED OPTICAL TRACKING SYSTEM CRANIOTOMY, EXCISE TUMOR;  Surgeon: Mirlande Colmenares MD;  Location: [facility] OR     REPAIR GAMEKEEPER'S THUMB Right 12/2/2016    Procedure: REPAIR LIGAMENT ULNAR COLLATERAL THUMB (GAMEKEEPER'S);  Surgeon: Evin Zamorano MD;  [facility]: [facility] OR     ZZC NONSPECIFIC PROCEDURE      right forearm fracture     Alcohol Swabs PADS  bictegravir-emtricitabine-tenofovir (BIKTARVY) -25 MG per tablet  blood glucose (NO BRAND SPECIFIED) test strip  blood glucose calibration (NO BRAND SPECIFIED) solution  blood glucose monitoring (NO BRAND SPECIFIED) meter device kit  buPROPion (WELLBUTRIN XL) 300 MG 24 hr tablet  empagliflozin (JARDIANCE) 25 MG TABS tablet  insulin pen needle (BD SCOTT U/F) 32G X 4 MM miscellaneous  loperamide (IMODIUM A-D) 2 MG tablet  metFORMIN (GLUCOPHAGE XR) 500 MG 24 hr tablet  pantoprazole (PROTONIX) 40 MG EC tablet  polyethylene glycol (MIRALAX) 17 GM/Dose powder  Sharps Container MISC  simethicone (MYLICON) 80 MG chewable tablet  STATIN NOT PRESCRIBED (INTENTIONAL)  thin (NO BRAND SPECIFIED) lancets      Allergies   Allergen Reactions     Metformin      Abdominal pain     Tylenol [Acetaminophen] Itching     Dulaglutide Rash     Insulin Lispro Rash     Patient reported     Penicillin V Other (See Comments) and Rash     Diffuse maculopapular rash + feels \"high\", per pt.      Family History  Family History   Problem Relation Age of " Onset     Diabetes Brother      Diabetes Father      Alzheimer Disease Father      Unknown/Adopted Mother      Diabetes Paternal Grandfather      Cancer No family hx of         no skin cancer     Skin Cancer No family hx of         no famiy hx of skin cancer     Glaucoma No family hx of      Macular Degeneration No family hx of      Social History   Social History     Tobacco Use     Smoking status: Current Every Day Smoker     Packs/day: 0.25     Years: 40.00     Pack years: 10.00     Types: Cigarettes     Smokeless tobacco: Former User   Substance Use Topics     Alcohol use: No     Alcohol/week: 0.0 standard drinks     Comment: Last etoh in 2007     Drug use: Not Currently     Types: Marijuana, Methamphetamines      Past medical history, past surgical history, medications, allergies, family history, and social history were reviewed with the patient. No additional pertinent items.       Review of Systems   Constitutional: Positive for fatigue. Negative for chills and fever.   HENT: Negative.    Respiratory: Negative for cough and shortness of breath.    Cardiovascular: Negative for chest pain.   Gastrointestinal: Positive for abdominal pain, constipation, nausea and vomiting. Negative for blood in stool and diarrhea.   Genitourinary: Negative.  Negative for decreased urine volume, difficulty urinating, dysuria, frequency, hematuria, penile pain, penile swelling, scrotal swelling, testicular pain and urgency.   Musculoskeletal: Negative for back pain, neck pain and neck stiffness.   Skin: Negative.  Negative for rash.   Neurological: Negative for dizziness, syncope and light-headedness.   Psychiatric/Behavioral: Negative for suicidal ideas.   All other systems reviewed and are negative.    A complete review of systems was performed with pertinent positives and negatives noted in the HPI, and all other systems negative.    Physical Exam   BP: (!) 119/91  Pulse: 119  Temp: 98.3  F (36.8  C)  Resp: 19  Height: 162.6 cm  "(5' 4\")  Weight: 70.3 kg (155 lb)  SpO2: 100 %  Physical Exam  CONSTITUTIONAL: Well-developed and well-nourished. Awake and alert. Non-toxic appearance. No acute distress.   HENT:   - Head: Normocephalic and atraumatic.   - Ears: Hearing and external ear grossly normal.   - Nose: Nose normal. No rhinorrhea. No epistaxis.   - Mouth/Throat: MMM  EYES: Conjunctivae and lids are normal. No scleral icterus.   NECK: Normal range of motion and phonation normal. Neck supple.  No tracheal deviation, no stridor. No edema or erythema noted.  CARDIOVASCULAR: Normal rate, regular rhythm and no appreciable abnormal heart sounds. Does have some tachycardia on arrival, but regular and improving  PULMONARY/CHEST: Normal work of breathing. No accessory muscle usage or stridor. No respiratory distress.  No appreciable abnormal breath sounds.  ABDOMEN: Soft, non-distended. No rigidity, rebound or guarding. Does have some diffuse abdominal discomfort, worse in the left abdomen without prachi peritoneal findings, palpable masses or abnormal pulsatility being appreciated.  MUSCULOSKELETAL: Extremities warm and seemingly well perfused. No edema or calf tenderness.  NEUROLOGIC: Awake, alert. Not disoriented. He displays no atrophy and no tremor. Normal tone. No seizure activity. GCS 15  SKIN: Skin is warm and dry. No rash noted. No diaphoresis. No pallor.   PSYCHIATRIC: Normal mood and affect. Speech and behavior normal. Thought processes linear. Cognition and memory are normal.      Assessments & Plan (with Medical Decision Making)   IMPRESSION: 54 year old male who has a PMH notable for DM2, HIV, prior CNS toxoplasmosis, multiple bowel obstruction/partial bowel obstructions, GERD, constipation nausea and vomiting, homelessness, et al., who is presenting today with recurrent abdominal pain he describes as being similar to prior bowel obstructions.    Clinically, patient appears nontoxic, NAD though he is having some mild tachycardia " (improving on reassessment during my exam.  Also found to have diffuse abdominal discomfort, worse on the left but without prachi peritoneal findings, palpable masses or abnormal pulsatility.  Otherwise no acute findings on exam and no other reports of symptoms on his history and he describes it as being similar to his prior bowel obstructions.  DDx includes, but not limited to, recurrent bowel obstruction, gastritis/PUD, diverticulitis, other types of colitis, enteritis, pyelonephritis/UTI, kidney stone, amongst others.    PLAN: Laboratory studies, urine studies, CT A/P, symptom management, disposition pending ED course  - Risks/benefits of pursuing imaging reviewed and accepted.     RESULTS:  - Labs:  lactate 1.5, WBC 10.0, Hgb 13.7 is up from previous  - Urine: Pending  - Imaging: Pending    INTERVENTIONS:   - IVF  - IV Dilaudid (0.5mg IV)    RE-EVALUATION:  - Pt otherwise continues to do well here in the ED, no acute issues or apparent concerning changes in vitals or clinical appearance.    DISCUSSIONS:  - w/ Patient: I have reviewed the available findings, tentative plan with the patient. He expressed understanding and agreement with this plan. All questions answered to the best of our ability at this time.     SIGNOUT:  - Patient signed out to overnight EM Physician.  - Impression at shift change: Abdominal pain (described as being similar to prior SBOs)  - Pending at shift change: CT A/P, UA, stool studies  - Tentative plan: F/U imaging, urine and stool studies, manage any positive findings accordingly, dispo pending ED course.         ______________________________________________________________________    This part of the medical record was transcribed by Cielo Salcedo Medical Scribe, from a dictation done by Ceci Trammell MD.       --  Ceci Trammell MD  [facility] EMERGENCY DEPARTMENT  6/5/2022     Ceci Trammell MD  06/06/22 6354      / Black

## 2022-06-06 NOTE — PROGRESS NOTES
"Shift: 0900 to 1930  VS: BP 97/60 (BP Location: Right arm)   Pulse 76   Temp 98.3  F (36.8  C) (Oral)   Resp 16   Ht 1.626 m (5' 4\")   Wt 70.3 kg (155 lb)   SpO2 96%   BMI 26.61 kg/m     Pain: 8/10  Neuro: Intact  Cardiac: WNL  Respiratory: LSC  Diet/Appetite: Regular   /GI: Diarrhea, none since admission to Obs  LDA's: PIV  Skin: Intact  Activity: Ind  Tests/Procedures: EGD  Pertinent Labs/Lab Collection:      Plan: Pain control, no blockage noted on EGD    "

## 2022-06-06 NOTE — ED NOTES
Bed: [facility]  Expected date:   Expected time:   Means of arrival:   Comments:  Avail - chair

## 2022-06-06 NOTE — ED TRIAGE NOTES
Patient arrives ambulatory to triage with people from home.   He has been having increased abdominal pain, nausea, vomiting, and diarrhea. This all started today about 8 hours ago. It feels like his previous bowel obstructions.

## 2022-06-07 ENCOUNTER — DOCUMENTATION ONLY (OUTPATIENT)
Dept: MEDSURG UNIT | Facility: CLINIC | Age: 57
End: 2022-06-07
Payer: COMMERCIAL

## 2022-06-07 VITALS
HEART RATE: 89 BPM | DIASTOLIC BLOOD PRESSURE: 71 MMHG | SYSTOLIC BLOOD PRESSURE: 113 MMHG | OXYGEN SATURATION: 100 % | TEMPERATURE: 98.6 F | BODY MASS INDEX: 26.46 KG/M2 | HEIGHT: 64 IN | WEIGHT: 155 LBS | RESPIRATION RATE: 18 BRPM

## 2022-06-07 LAB
GLUCOSE BLDC GLUCOMTR-MCNC: 221 MG/DL (ref 70–99)
GLUCOSE BLDC GLUCOMTR-MCNC: 235 MG/DL (ref 70–99)
GLUCOSE BLDC GLUCOMTR-MCNC: 245 MG/DL (ref 70–99)
PATH REPORT.COMMENTS IMP SPEC: NORMAL
PATH REPORT.FINAL DX SPEC: NORMAL
PATH REPORT.GROSS SPEC: NORMAL
PATH REPORT.MICROSCOPIC SPEC OTHER STN: NORMAL
PATH REPORT.RELEVANT HX SPEC: NORMAL
PHOTO IMAGE: NORMAL
UPPER GI ENDOSCOPY: NORMAL

## 2022-06-07 PROCEDURE — 88305 TISSUE EXAM BY PATHOLOGIST: CPT | Mod: 26 | Performed by: PATHOLOGY

## 2022-06-07 PROCEDURE — 250N000013 HC RX MED GY IP 250 OP 250 PS 637

## 2022-06-07 PROCEDURE — 99239 HOSP IP/OBS DSCHRG MGMT >30: CPT | Mod: GC | Performed by: INTERNAL MEDICINE

## 2022-06-07 PROCEDURE — 250N000013 HC RX MED GY IP 250 OP 250 PS 637: Performed by: STUDENT IN AN ORGANIZED HEALTH CARE EDUCATION/TRAINING PROGRAM

## 2022-06-07 PROCEDURE — 0DB98ZX EXCISION OF DUODENUM, VIA NATURAL OR ARTIFICIAL OPENING ENDOSCOPIC, DIAGNOSTIC: ICD-10-PCS | Performed by: INTERNAL MEDICINE

## 2022-06-07 RX ORDER — PANTOPRAZOLE SODIUM 20 MG/1
20 TABLET, DELAYED RELEASE ORAL
Qty: 60 TABLET | Refills: 0 | Status: SHIPPED | OUTPATIENT
Start: 2022-06-07 | End: 2022-07-07

## 2022-06-07 RX ADMIN — INSULIN ASPART 2 UNITS: 100 INJECTION, SOLUTION INTRAVENOUS; SUBCUTANEOUS at 04:11

## 2022-06-07 RX ADMIN — PANTOPRAZOLE SODIUM 20 MG: 20 TABLET, DELAYED RELEASE ORAL at 08:07

## 2022-06-07 RX ADMIN — BICTEGRAVIR SODIUM, EMTRICITABINE, AND TENOFOVIR ALAFENAMIDE FUMARATE 1 TABLET: 50; 200; 25 TABLET ORAL at 08:07

## 2022-06-07 RX ADMIN — INSULIN ASPART 2 UNITS: 100 INJECTION, SOLUTION INTRAVENOUS; SUBCUTANEOUS at 08:10

## 2022-06-07 RX ADMIN — INSULIN ASPART 3 UNITS: 100 INJECTION, SOLUTION INTRAVENOUS; SUBCUTANEOUS at 00:19

## 2022-06-07 ASSESSMENT — ACTIVITIES OF DAILY LIVING (ADL)
ADLS_ACUITY_SCORE: 20
DEPENDENT_IADLS:: INDEPENDENT

## 2022-06-07 NOTE — PROGRESS NOTES
Brief Social Work Note    Expected Discharge Date:  6/7/2022     Concerns to be Addressed:    Bus tokens    Additional Information:    1228 Per OBS RN Gretchen request, SW met with pt and  provided him 5 bus tokens for discharge transportation. No additional questions or concerns were reported. SW provided availability and contact information and excused self from his bedside.    SW will continue to follow as needed.    RADHA Reese, [facility]  ED/OBS   [facility]  Phone: [phone]  Pager: [phone]  Fax: [phone]     On-call pager, [phone], 4:00 pm to midnight

## 2022-06-07 NOTE — PROGRESS NOTES
Textpage to team--->Obs 4 M.P. Can you change q 4hrs blood sugar orders and SS to reflect that the patient is no longer NPO?  Thanks

## 2022-06-07 NOTE — CONSULTS
Care Management Initial Consult    General Information  Assessment completed with: VM-chart review,         Primary Care Provider verified and updated as needed:     Readmission within the last 30 days: previous discharge plan unsuccessful      Reason for Consult: discharge planning, other (see comments) (risk unplanned readmission)  Advance Care Planning: Advance Care Planning Reviewed: no concerns identified          Communication Assessment  Patient's communication style: spoken language (English or Bilingual)    Hearing Difficulty or Deaf: no   Wear Glasses or Blind: no    Cognitive  Cognitive/Neuro/Behavioral: WDL                      Living Environment:   People in home: alone     Current living Arrangements:        Able to return to prior arrangements: yes       Family/Social Support:  Care provided by: self  Provides care for: no one                Description of Support System:           Current Resources:   Patient receiving home care services: No     Community Resources: County Worker  Equipment currently used at home: none  Supplies currently used at home:      Employment/Financial:  Employment Status: unemployed        Financial Concerns: No concerns identified           Lifestyle & Psychosocial Needs:  Social Determinants of Health     Tobacco Use: High Risk     Smoking Tobacco Use: Current Every Day Smoker     Smokeless Tobacco Use: Former User   Alcohol Use: Unknown     Frequency of Alcohol Consumption: Not on file     Average Number of Drinks: Patient refused     Frequency of Binge Drinking: Not on file   Financial Resource Strain: Not on file   Food Insecurity: Not on file   Transportation Needs: Not on file   Physical Activity: Not on file   Stress: Not on file   Social Connections: Not on file   Intimate Partner Violence: Not on file   Depression: Not at risk     PHQ-2 Score: 0   Housing Stability: Not on file       Functional Status:  Prior to admission patient needed assistance:   Dependent  ADLs:: Independent  Dependent IADLs:: Independent       Mental Health Status:          Chemical Dependency Status:                Values/Beliefs:  Spiritual, Cultural Beliefs, Muslim Practices, Values that affect care: no               Additional Information:  Initial case management assessment done per chart review as patient has elevated risk score for unplanned readmission. Per other notes patient is homeless, stays at a shelter. Patient already has discharge orders, no new needs at this point. RNCC available for any planning needs.       GHAZALA Monroe, BA, RN, CMSRN, RNCC  Pager: [phone]  Phone: [phone]  [facility] floor Weekend/Holiday Pager: [phone]  Observation weekend/after hours: [phone]

## 2022-06-07 NOTE — PROGRESS NOTES
Brief Progress Note    Patient admitted overnight with CT findings concerning for GOO. Evaluated by surgery, recommended NG placement, which patient declined, and GI consult.  Underwent EGD today without evidence of ulcers nor obstruction. Biopsies obtained from the stomach antrum and duodenum.    Plan:   - Per GI, can decrease pantoprazole to 20 mg BID x 2 months; may continue every day dosing subsequently if issues with heartburn or regurgitation  - No further GI follow up needed  - CLD ADAT   - Rest of plan per H&P     Patient seen and discussed with Dr. Hoffmann.     Linda Burris MD  PGY-2 Internal Medicine

## 2022-06-07 NOTE — PROGRESS NOTES
"Gastroenterology Inpatient Sign Off Note    Inpatient GI consults service will sign off. No further recommendations at this time. If primary team has addition questions, please page consult fellow listed in Maria T.    Current GI Consult Staff: Dr. Benítez    Assessment:  54 year old male with a history of HIV on Biktarvy, HSV+ anal biopsies, recurrent SBO, history of substance abuse, GERD, HLD, diagnostic laparoscopy x2 (2016, 2018), lap appendectomy c/b adhesions (2018) and poorly controlled DM2, history of COVID 1/2022, who presents to the ED with abdominal pain. CT scan findings concerning for GOO for which GI has been consulted. S/p EGD without evidence of ulcers or obstruction. Biopsies taken from stomach antrum and duodenum.      # Abdominal pain  # Gastric distention concerning for GOO  - Patient presents with nausea, vomiting, diffuse abdominal pain  - CT A/P on admission with \"Marked fluid distention of the stomach. This extends up to an area of localized narrowing in the distal aspect of the stomach and the first portion of the duodenum. Possible wall thickening is present at this location which could be due to ulceration or inflammatory change.\"  - General surgery consulted, recommended NGT but patient declined. Recommended GI consult for EGD  - s/p EGD without evidence of ulcers or obstruction. Biopsies taken from stomach antrum and duodenum.      # Diarrhea  - Reports 7 episodes of watery diarrhea PTA  - Cdiff initially ordered and enteric pathogens ordered, but patient has not had bowel movement since arrival      # History of recurrent SBO  - Patient with almost monthly admissions for recurrent bowel obstructions. Prior appendectomy, ex lap with lysis of adhesions (2016, 2018)  - Most recent admission 5/25-5/29 for abdominal pain with recurrent SBO which resolved with gastrograffin and bowel rest. CT scan at that time with partial mechanical small bowel obstruction involving the proximal to mid jejunum " "with transition site seen in the lower right abdomen.   - He has had several work up over the years including small bowel enteroscopy and colonoscopy in 2016 which were both unrevealing, several cross sectional imaging and MRE which have mostly been normal.   - Per past GI notes \"Most plausibly his chronic obstructive episodes are 2/2 to a past HIV related enteritis (histoplasmosis or otherwise) that has resolved and left him with multiple segment of SB stricturing. Surgery did not find extensive adhesions on dx laparoscopy in 2018. MR enterography in 2019 similarly did not show active enteritis. No evidence to suggest lymphoma, infection, Whipple's or extremely poor motility as culprit.\"    Recommendations:  - Await pathology   - Pantoprazole to 20 mg BID x 2 months; may continue every day dosing subsequently if issues with heartburn or regurgitation  - No further GI follow up needed  - CLD ADAT     Follow up recommendations:   No outpatient GI clinic follow-up indicated. Follow-up with primary care provider at timing determined by discharge physician.        Gris Saeed PA-C  GI Services  Text Page    "

## 2022-06-07 NOTE — PLAN OF CARE
Time 2395-0774    Vitals: VSS on RA  Activity: Up ad lexi  Pain: Denies  Neuro: A&OX4    Cardiac:  WDL  Respiratory:  Denies cough/SOB, refused CPAP overnight  GI/: Voids spontaneously, LBM 6/5, Denies N/V/D  Diet: Regular  Lines: L PIV SL  Skin/Wounds: CDI  Labs/imaging:  Reviewed, , 245, 235    New changes this shift:  Stable overnight.     Plan: Symptom management, disposition TBD.     Continue to monitor and follow POC       Goal Outcome Evaluation:    Plan of Care Reviewed With: patient     Overall Patient Progress: no change    Outcome Evaluation: Denies pain, EGD completed, +BS and passing gas

## 2022-06-07 NOTE — PROGRESS NOTES
Textpage to provider--->Obs 4 M.P.  Just to clarify, pt reports that he must have a BM in order to discharge.  Is this correct?

## 2022-06-07 NOTE — PROGRESS NOTES
Prior Authorization Approval    Pantoprazole 20mg tabs  Date Initiated: 6/7/2022  Date Completed: 6/7/2022  Prior Auth Type: Quantity Limit                Status: Approved    Effective Date: 06/07/2022 - 06/07/2023  Copay: 0.00     Filling Pharmacy: [facility] - [place], [place] - [address] [place] [address]    Insurance: [facility] - Phone [phone] Fax [phone]  ID: [id number]  Case Number: [id number]   Submitted Via: Inge Martin  [facility] Liaison  Ph: [phone] Pager: [phone]

## 2022-06-08 ENCOUNTER — TELEPHONE (OUTPATIENT)
Dept: SURGERY | Facility: CLINIC | Age: 57
End: 2022-06-08
Payer: COMMERCIAL

## 2022-06-08 ENCOUNTER — PATIENT OUTREACH (OUTPATIENT)
Dept: CARE COORDINATION | Facility: CLINIC | Age: 57
End: 2022-06-08
Payer: COMMERCIAL

## 2022-06-08 DIAGNOSIS — Z71.89 OTHER SPECIFIED COUNSELING: ICD-10-CM

## 2022-06-08 NOTE — TELEPHONE ENCOUNTER
Voicemail full. Called to [facility] Please Schedule This Appointment:     With: Carrie Mary NP     Referring Provider: self     For / Appt Notes: micro     Appt Type: DAKSHA Larryco     Appt Date/Time: next available     Thank you!   Left K pod number

## 2022-06-08 NOTE — PROGRESS NOTES
Clinic Care Coordination Contact  [facility]/Voicemail       Clinical Data: Care Coordinator Outreach    Outreach attempted x 1.  Unable to leave a message on patient's voicemail with call back information, mailbox is full    Plan:  Care Coordinator will try to reach patient again in 1-2 business days.    Gill Miranda, [facility]  [phone]  [facility]

## 2022-06-09 NOTE — PROGRESS NOTES
Clinic Care Coordination Contact  [facility]/Voicemail       Clinical Data: Care Coordinator Outreach    Outreach attempted x 2.  Unable to leave message on patient's voicemail with call back information, mailbox is full    Plan:  Care Coordinator will do no further outreaches at this time.    Gill Miranda, [facility]  [phone]  [facility]

## 2022-06-11 NOTE — DISCHARGE SUMMARY
"[facility]  Discharge Summary - Medicine & Pediatrics       Date of Admission:  6/5/2022  Date of Discharge:  6/7/2022  1:09 PM  Discharging Provider: Shaw Hunt  Discharge Service: Medicine Service, JORGE TEAM 4    Discharge Diagnoses    Gastric distention without GOO  LA Grade B reflux esophagitis with no bleeding  Small hiatal hernia    Follow-ups Needed After Discharge   Follow-up Appointments     Adult [facility] Follow-up and recommended labs and tests      Follow up with PCP in 2 weeks.    Appointments on [place] and/or [facility] (with [facility]   provider or service). Call [phone] if you haven't heard regarding   these appointments within 7 days of discharge.             Unresulted Labs Ordered in the Past 30 Days of this Admission     No orders found from 5/6/2022 to 6/6/2022.        Discharge Disposition   Discharged to home  Condition at discharge: Stable    Hospital Course   Andrew Lockwood was admitted on 6/5/2022 for abdominal pain with initial concern for GOO.  The following problems were addressed during his hospitalization:    Abdominal pain  Gastric distention without endoscopic evidence of GOO  - Patient presented with nausea, vomiting, diffuse abdominal pain  - CT A/P on admission with \"Marked fluid distention of the stomach. This extends up to an area of localized narrowing in the distal aspect of the stomach and the first portion of the duodenum. Possible wall thickening is present at this location which could be due to ulceration or inflammatory change.\"  - General surgery consulted, recommended NGT but patient declined. Recommended GI consult for EGD  - s/p EGD 6/6 without evidence of ulcers or obstruction. Biopsies taken from stomach antrum and duodenum.  Recommendations:  - Follow up pathology from biopsies  - Changed pantoprazole to 20 mg BID x 2 months; may continue every day dosing subsequently if issues with heartburn or " "regurgitation  - No GI follow up needed    Diarrhea  - Reported 7 episodes of watery diarrhea PTA  - Cdiff initially ordered and enteric pathogens ordered, but patient has not had bowel movement since arrival      Consultations This Hospital Stay   SURGERY GENERAL ADULT IP CONSULT  GI LUMINAL ADULT IP CONSULT  CARE MANAGEMENT / SOCIAL WORK IP CONSULT    Code Status   Prior       The patient was discussed with Dr. Hunt.    Linda Burris MD  [facility]  Formerly [facility] [address] [address]  Phone: [phone]  Fax: [phone]  ______________________________________________________________________    Physical Exam   Vital Signs:/71 (BP Location: Right arm)   Pulse 89   Temp 98.6  F (37  C) (Oral)   Resp 18   Ht 1.626 m (5' 4\")   Wt 70.3 kg (155 lb)   SpO2 100%   BMI 26.61 kg/m    Weight: 155 lbs 0 oz  Gen: well-appearing, sitting in bed, in NAD  CV: RRR  Pulm: CTAB, no increased WOB on RA  GI: soft, NT, ND  : no suprapubic tenderness  Neuro: alert, conversant, responds to questions appropriately  Ext: no bilateral peripheral extremity edema  Psych: normal affect    Primary Care Physician   Ayala Prieto    Discharge Orders      Activity    Your activity upon discharge: activity as tolerated     Reason for your hospital stay    You were admitted for abdominal pain and underwent an upper endoscopy to evaluate for possible ulcer or obstruction. We did not identify any issues. We changed your pantoprazole from 40 mg per day to 20 mg twice per day. You do not need further gastroenterology follow up. We recommend continuing to work on your diabetes control to improve your abdominal symptoms.     Adult [facility] Follow-up and recommended labs and tests    Follow up with PCP in 2 weeks.    Appointments on [place] and/or [facility] (with [facility] or [facility] provider or service). Call [phone] if you haven't heard regarding these " appointments within 7 days of discharge.     Diet    Follow this diet upon discharge: Orders Placed This Encounter      Regular Diet Adult       Significant Results and Procedures      EGD 6/6/22  Impression:    - LA Grade B reflux esophagitis with no bleeding.                          - Small hiatal hernia.                          - Normal examined duodenum. Biopsied.   Recommendation:              - Await pathology results.                          - Okay to advance diet and follow clinical course.                          - Pantoprazole 20 mg BID for two months for                          esophagitis. Continue PPI beyond two months if                          symptoms of heartburn and regurgitation persist.     Results for orders placed or performed during the hospital encounter of 06/05/22   CT Abdomen Pelvis w Contrast    Narrative    EXAM: CT ABDOMEN PELVIS W CONTRAST  LOCATION: [facility]  DATE/TIME: 6/5/2022 11:00 PM    INDICATION: Nonlocalized abdominal pain with history of small bowel obstruction. Evaluate for recurrence.  COMPARISON: Multiple priors, most recent 05/25/2022, 05/20/2022 and dating back to chest CT 07/08/2020.  TECHNIQUE: CT scan of the abdomen and pelvis was performed following injection of IV contrast. Multiplanar reformats were obtained. Dose reduction techniques were used.  CONTRAST: Iopamidol (Isovue 370) solution 95 mL.    FINDINGS:   LOWER CHEST: Unchanged 7 mm pulmonary nodule involving the central aspect of the right middle lobe (5-1). Minimal linear atelectasis in the lung bases. No pleural fluid.    HEPATOBILIARY: No calcified gallstones or biliary dilatation. Hepatic steatosis. Low-attenuation subcentimeter liver lesion(s) compatible with benign cysts or other benign lesions. No specific evaluation or follow-up is recommended in a low risk   patient..    PANCREAS: Normal.    SPLEEN: Normal.    ADRENAL GLANDS:  Normal.    KIDNEYS/BLADDER: Symmetric renal enhancement. Low-attenuation subcentimeter renal lesion(s). These are compatible with small benign cysts and no specific imaging evaluation or follow-up is recommended. No urinary collecting system dilatation or calculi.   Bladder unremarkable.    BOWEL: Marked fluid distention in the stomach. This extends down to an area of localized narrowing of the distal aspect of the stomach in the first portion of the duodenum (series 5, image 136). Second and third portions of the duodenum as well as the   fourth portion decompressed. No small bowel dilatation or inflammatory change. Colonic diverticulosis.    LYMPH NODES: No lymphadenopathy.    VASCULATURE: Normal caliber abdominal aorta. Minimal vascular calcification.    PELVIC ORGANS: Pateros anterior to the bladder in the left aspect of the pelvis. Prosthetic device within the penis.    MUSCULOSKELETAL: Fat-containing umbilical hernia, minimal. No overt osseous abnormality.      Impression    IMPRESSION:   1.  Marked fluid distention of the stomach. This extends up to an area of localized narrowing in the distal aspect of the stomach and the first portion of the duodenum. Possible wall thickening is present at this location which could be due to ulceration   or inflammatory change. Recommend correlation for epigastric abdominal pain and consideration of EGD.    2.  No small bowel obstruction.     *Note: Due to a large number of results and/or encounters for the requested time period, some results have not been displayed. A complete set of results can be found in Results Review.       Discharge Medications   Discharge Medication List as of 6/7/2022 12:25 PM      CONTINUE these medications which have CHANGED    Details   pantoprazole (PROTONIX) 20 MG EC tablet Take 1 tablet (20 mg) by mouth 2 times daily (before meals), Disp-60 tablet, R-0, E-Prescribe         CONTINUE these medications which have NOT CHANGED    Details    Alcohol Swabs PADS Use to swab the area of the injection or todd as directed Per insurance coverage, Disp-100 each, R-0, E-Prescribe      bictegravir-emtricitabine-tenofovir (BIKTARVY) -25 MG per tablet Take 1 tablet by mouth daily, Disp-30 tablet, R-0, E-Prescribe      blood glucose (NO BRAND SPECIFIED) test strip Use to test blood sugar 4 times daily or as directed. To accompany:whatever is covered, Disp-360 strip, R-3, E-Prescribe      blood glucose calibration (NO BRAND SPECIFIED) solution Used to calibrate the blood glucose monitor as needed and as directed.  To accompany  blood glucose brands per insurance coverage, Disp-1 each, R-0, E-Prescribe      blood glucose monitoring (NO BRAND SPECIFIED) meter device kit Use as directed. Per insurance coverage.Disp-1 kit, [id number]      buPROPion (WELLBUTRIN XL) 300 MG 24 hr tablet Take 1 tablet (300 mg) by mouth daily, Disp-30 tablet, R-0, E-Prescribe      empagliflozin (JARDIANCE) 25 MG TABS tablet Take 1 tablet (25 mg) by mouth daily, Disp-30 tablet, R-0, E-Prescribe      insulin pen needle (BD SCOTT U/F) 32G X 4 MM miscellaneous USE 3 TO 4 NEEDLES DAILY. Follow up visit needed. Call  to schedule.Disp-400 each, [id number]      loperamide (IMODIUM A-D) 2 MG tablet Take 1 tablet (2 mg) by mouth 4 times daily as needed for diarrhea, Disp-12 tablet, R-0, E-Prescribe      metFORMIN (GLUCOPHAGE XR) 500 MG 24 hr tablet Take 1 tablet (500 mg) by mouth daily (with dinner), Disp-30 tablet, R-0, E-Prescribe      polyethylene glycol (MIRALAX) 17 GM/Dose powder Take 17 g by mouth daily, Disp-510 g, R-3, E-Prescribe      Sharps Container MISC Use as directed to dispose of needles, lancets and other sharps. Per Insurance coverage, Disp-1 each, R-0, E-Prescribe      simethicone (MYLICON) 80 MG chewable tablet Take 1 tablet (80 mg) by mouth every 6 hours as needed for cramping, Disp-90 tablet, R-0, E-Prescribe      STATIN NOT PRESCRIBED (INTENTIONAL)  "Reason Statin was Not Prescribed: OTHER - Enter reason is comments section (This option does NOTexclude patient from measure) / noted in chart that is recommended multiple timesNo Print Out      thin (NO BRAND SPECIFIED) lancets Use with lanceting device. To accompany: Test 4 times daily with whatever is covered, Disp-360 each, R-3, E-Prescribe           Allergies   Allergies   Allergen Reactions     Metformin      Abdominal pain     Tylenol [Acetaminophen] Itching     Dulaglutide Rash     Insulin Lispro Rash     Patient reported     Penicillin V Other (See Comments) and Rash     Diffuse maculopapular rash + feels \"high\", per pt.      "

## 2022-06-24 ENCOUNTER — HOSPITAL ENCOUNTER (EMERGENCY)
Facility: CLINIC | Age: 57
Discharge: HOME | End: 2022-06-24
Attending: EMERGENCY MEDICINE | Admitting: EMERGENCY MEDICINE
Payer: COMMERCIAL

## 2022-06-24 VITALS
RESPIRATION RATE: 16 BRPM | DIASTOLIC BLOOD PRESSURE: 76 MMHG | WEIGHT: 155 LBS | HEART RATE: 80 BPM | SYSTOLIC BLOOD PRESSURE: 122 MMHG | OXYGEN SATURATION: 99 % | TEMPERATURE: 98.4 F | BODY MASS INDEX: 26.46 KG/M2 | HEIGHT: 64 IN

## 2022-06-24 DIAGNOSIS — L02.91 ABSCESS: ICD-10-CM

## 2022-06-24 LAB
ALBUMIN UR-MCNC: NEGATIVE MG/DL
APPEARANCE UR: CLEAR
BILIRUB UR QL STRIP: NEGATIVE
COLOR UR AUTO: ABNORMAL
GLUCOSE UR STRIP-MCNC: >=1000 MG/DL
HGB UR QL STRIP: NEGATIVE
KETONES UR STRIP-MCNC: NEGATIVE MG/DL
LEUKOCYTE ESTERASE UR QL STRIP: NEGATIVE
NITRATE UR QL: NEGATIVE
PH UR STRIP: 5.5 [PH] (ref 5–7)
RBC URINE: 0 /HPF
SP GR UR STRIP: 1.04 (ref 1–1.03)
TRANSITIONAL EPI: <1 /HPF
UROBILINOGEN UR STRIP-MCNC: NORMAL MG/DL
WBC URINE: 0 /HPF

## 2022-06-24 PROCEDURE — 81001 URINALYSIS AUTO W/SCOPE: CPT | Performed by: FAMILY MEDICINE

## 2022-06-24 PROCEDURE — 87591 N.GONORRHOEAE DNA AMP PROB: CPT | Performed by: FAMILY MEDICINE

## 2022-06-24 PROCEDURE — 87491 CHLMYD TRACH DNA AMP PROBE: CPT | Performed by: FAMILY MEDICINE

## 2022-06-24 PROCEDURE — 99283 EMERGENCY DEPT VISIT LOW MDM: CPT | Performed by: EMERGENCY MEDICINE

## 2022-06-24 RX ORDER — DOXYCYCLINE HYCLATE 100 MG
100 TABLET ORAL 2 TIMES DAILY
Qty: 20 TABLET | Refills: 0 | Status: SHIPPED | OUTPATIENT
Start: 2022-06-24 | End: 2022-07-04

## 2022-06-24 NOTE — DISCHARGE INSTRUCTIONS
Please take the antibiotics as prescribed.     Take tylenol as needed for pain.     The 2 small abscess do not need to be opened up/drained by incision at this time.     Please make an appointment to follow up with Your Primary Care Provider in 3-5 days even if entirely better.

## 2022-06-24 NOTE — ED TRIAGE NOTES
"Pt c/o of dizziness which he believes is from an STD exposure.... wants to be tested for STDs as well as have some \"contagious bumps on my skin looked at.\"    Denies CP and SOB.     /84   Pulse (!) 17   Temp 98.4  F (36.9  C) (Oral)   Resp 16   Ht 1.626 m (5' 4\")   Wt 70.3 kg (155 lb)   SpO2 99%   BMI 26.61 kg/m         Triage Assessment     Row Name 06/24/22 0819       Triage Assessment (Adult)    Airway WDL WDL       Respiratory WDL    Respiratory WDL WDL       Skin Circulation/Temperature WDL    Skin Circulation/Temperature WDL WDL       Cardiac WDL    Cardiac WDL WDL       Peripheral/Neurovascular WDL    Peripheral Neurovascular WDL WDL       Cognitive/Neuro/Behavioral WDL    Cognitive/Neuro/Behavioral WDL WDL       [place] Coma Scale    Best Eye Response 4-->(E4) spontaneous    Best Motor Response 6-->(M6) obeys commands    Best Verbal Response 5-->(V5) oriented    [place] Coma Scale Score 15              "

## 2022-06-24 NOTE — ED PROVIDER NOTES
[facility] EMERGENCY DEPARTMENT ([facility])  6/24/22    History     Chief Complaint   Patient presents with     Dizziness     Exposure to STD     Rash     HPI  Andrew Lockwood is a 54 year old male with PMH significant for DM2, HIV, prior CNS toxoplasmosis, multiple bowel obstruction/partial bowel obstructions, GERD, constipation nausea and vomiting, homelessness, among others, who presents to the ED for evaluation of bumps on his left forehead and left buttocks.  He states that these have both been present for the past week and a half.  He states he wants these bumps to be seen as he thinks they look contagious.        Past Medical History  Past Medical History:   Diagnosis Date     AIDS (H)      Allergic rhinitis due to other allergen     DNS     Anal dysplasia      Chronic abdominal pain      CNS toxoplasmosis (H)      Diabetes type 2, controlled (H)      GERD (gastroesophageal reflux disease)      History of seizure      History of substance abuse (H)      HIV (human immunodeficiency virus infection) (H)      HLD (hyperlipidemia)      Lung nodules      Periungual wart      PTSD (post-traumatic stress disorder)      Sleep apnea     doesn't use CPAP     Past Surgical History:   Procedure Laterality Date     ANOSCOPY N/A 9/9/2020    Procedure: Exam Under Anesthesia, ANOSCOPY, fulgeration of rectal fissures with Rectal Biopsies;  Surgeon: Thanh Lundberg MD;  Location: [facility] OR     COLONOSCOPY Left 1/22/2016    Procedure: COMBINED COLONOSCOPY, SINGLE OR MULTIPLE BIOPSY/POLYPECTOMY BY BIOPSY;  Surgeon: Clark Saini MD;  Location:  GI     ESOPHAGOSCOPY, GASTROSCOPY, DUODENOSCOPY (EGD), COMBINED N/A 6/6/2022    Procedure: ESOPHAGOGASTRODUODENOSCOPY, WITH BIOPSY;  Surgeon: Kecia Benítez MD;  Location:  GI     HC EXPLORE UNDESC TESTIS,INGUIN/SCROTAL       LAPAROSCOPIC APPENDECTOMY N/A 1/31/2018    Procedure: LAPAROSCOPIC APPENDECTOMY;  LAPAROSCOPIC APPENDECTOMY;  Surgeon: Dawn Holt  "MD Carrie;  Location: [facility] OR     LAPAROSCOPY DIAGNOSTIC (GENERAL) N/A 7/26/2016    Procedure: LAPAROSCOPY DIAGNOSTIC (GENERAL);  Surgeon: Susannah Arriaga MD;  Location: [facility] OR     LAPAROSCOPY DIAGNOSTIC (GENERAL) N/A 4/16/2018    Procedure: LAPAROSCOPY DIAGNOSTIC (GENERAL);  Diagnostic laparoscopy and lysis of adhesions;  Surgeon: Prince Dowling MD;  Location: [facility] OR     OPTICAL TRACKING SYSTEM CRANIOTOMY, EXCISE TUMOR, COMBINED Left 4/10/2015    Procedure: COMBINED OPTICAL TRACKING SYSTEM CRANIOTOMY, EXCISE TUMOR;  Surgeon: Mirlande Colmenares MD;  Location: [facility] OR     REPAIR GAMEKEEPER'S THUMB Right 12/2/2016    Procedure: REPAIR LIGAMENT ULNAR COLLATERAL THUMB (GAMEKEEPER'S);  Surgeon: Evin Zamorano MD;  Location:  OR     ZC NONSPECIFIC PROCEDURE      right forearm fracture     doxycycline hyclate (VIBRA-TABS) 100 MG tablet  Alcohol Swabs PADS  bictegravir-emtricitabine-tenofovir (BIKTARVY) -25 MG per tablet  blood glucose (NO BRAND SPECIFIED) test strip  blood glucose calibration (NO BRAND SPECIFIED) solution  blood glucose monitoring (NO BRAND SPECIFIED) meter device kit  buPROPion (WELLBUTRIN XL) 300 MG 24 hr tablet  empagliflozin (JARDIANCE) 25 MG TABS tablet  insulin pen needle (BD SCOTT U/F) 32G X 4 MM miscellaneous  loperamide (IMODIUM A-D) 2 MG tablet  metFORMIN (GLUCOPHAGE XR) 500 MG 24 hr tablet  pantoprazole (PROTONIX) 20 MG EC tablet  polyethylene glycol (MIRALAX) 17 GM/Dose powder  Sharps Container MISC  simethicone (MYLICON) 80 MG chewable tablet  STATIN NOT PRESCRIBED (INTENTIONAL)  thin (NO BRAND SPECIFIED) lancets      Allergies   Allergen Reactions     Metformin      Abdominal pain     Tylenol [Acetaminophen] Itching     Dulaglutide Rash     Insulin Lispro Rash     Patient reported     Penicillin V Other (See Comments) and Rash     Diffuse maculopapular rash + feels \"high\", per pt.      Family History  Family History   Problem Relation Age of Onset     Diabetes Brother " "     Diabetes Father      Alzheimer Disease Father      Unknown/Adopted Mother      Diabetes Paternal Grandfather      Cancer No family hx of         no skin cancer     Skin Cancer No family hx of         no famiy hx of skin cancer     Glaucoma No family hx of      Macular Degeneration No family hx of      Social History   Social History     Tobacco Use     Smoking status: Current Every Day Smoker     Packs/day: 0.25     Years: 40.00     Pack years: 10.00     Types: Cigarettes     Smokeless tobacco: Former User   Substance Use Topics     Alcohol use: No     Alcohol/week: 0.0 standard drinks     Comment: Last etoh in 2007     Drug use: Not Currently     Types: Marijuana, Methamphetamines      Past medical history, past surgical history, medications, allergies, family history, and social history were reviewed with the patient. No additional pertinent items.       Review of Systems   Skin:        Bump on forehead and L buttock   All other systems reviewed and are negative.    A complete review of systems was performed with pertinent positives and negatives noted in the HPI, and all other systems negative.    Physical Exam   BP: 129/84  Pulse: 71  Temp: 98.4  F (36.9  C)  Resp: 16  Height: 162.6 cm (5' 4\")  Weight: 70.3 kg (155 lb)  SpO2: 99 %  Physical Exam  Constitutional:       Appearance: He is well-developed.   HENT:      Head: Normocephalic and atraumatic.   Cardiovascular:      Rate and Rhythm: Normal rate and regular rhythm.      Heart sounds: Normal heart sounds.   Pulmonary:      Effort: Pulmonary effort is normal. No respiratory distress.      Breath sounds: No wheezing.   Abdominal:      General: There is no distension.      Palpations: Abdomen is soft.      Tenderness: There is no abdominal tenderness. There is no rebound.   Musculoskeletal:      Cervical back: Normal range of motion and neck supple.   Skin:     General: Skin is warm.      Comments: 1 small dime sized bump in the left scalp region just next " to the forehead.  This area is nonfluctuant.  No surrounding erythema.  Mild tenderness to touch.  Appears like a larger infected pimple.  Another dime sized bump on the right buttocks region.  Nonfluctuant.  No surrounding erythema.  Mild induration of the skin.   Neurological:      Mental Status: He is alert and oriented to person, place, and time.   Psychiatric:         Behavior: Behavior normal.         Thought Content: Thought content normal.         ED Course      Procedures   10:34 AM  The patient was seen and examined by Analilia Galeas MD in Room EDVTA.        The medical record was reviewed and interpreted.  Current labs reviewed and interpreted.       Results for orders placed or performed during the hospital encounter of 06/24/22   UA with Microscopic reflex to Culture     Status: Abnormal    Specimen: Urine, Midstream   Result Value Ref Range    Color Urine Straw Colorless, Straw, Light Yellow, Yellow    Appearance Urine Clear Clear    Glucose Urine >=1000 (A) Negative mg/dL    Bilirubin Urine Negative Negative    Ketones Urine Negative Negative mg/dL    Specific Gravity Urine 1.036 (H) 1.003 - 1.035    Blood Urine Negative Negative    pH Urine 5.5 5.0 - 7.0    Protein Albumin Urine Negative Negative mg/dL    Urobilinogen Urine Normal Normal, 2.0 mg/dL    Nitrite Urine Negative Negative    Leukocyte Esterase Urine Negative Negative    RBC Urine 0 <=2 /HPF    WBC Urine 0 <=5 /HPF    Transitional Epithelials Urine <1 <=1 /HPF    Narrative    Urine Culture not indicated     Medications - No data to display         No results found for any visits on 06/24/22.  Medications - No data to display     Assessments & Plan (with Medical Decision Making)   Patient with 2 small bumps on his body.  Both look like early abscesses that do not need I&D at this time.  They are more like larger carbuncles.  Plan will be to treat with a short course of antibiotics.  Patient with no systemic signs.  Patient stable for  discharge    I have reviewed the nursing notes. I have reviewed the findings, diagnosis, plan and need for follow up with the patient.    New Prescriptions    No medications on file       Final diagnoses:   Abscess - scalp and buttock     IMahesh, am serving as a trained medical scribe to document services personally performed by Analilia Galeas MD, based on the provider's statements to me.     I, Analilia Galeas MD, was physically present and have reviewed and verified the accuracy of this note documented by Mahesh Trinidad.    --  Analilia Galeas MD  [facility] EMERGENCY DEPARTMENT  6/24/2022     Analilia Galeas MD  06/25/22 0919

## 2022-06-25 LAB
C TRACH DNA SPEC QL NAA+PROBE: NEGATIVE
N GONORRHOEA DNA SPEC QL NAA+PROBE: NEGATIVE

## 2022-07-06 ENCOUNTER — TRANSFERRED RECORDS (OUTPATIENT)
Dept: MULTI SPECIALTY CLINIC | Facility: CLINIC | Age: 57
End: 2022-07-06

## 2022-07-06 LAB — HBA1C MFR BLD: 8.6 %

## 2022-08-02 ENCOUNTER — TELEPHONE (OUTPATIENT)
Dept: ENDOCRINOLOGY | Facility: CLINIC | Age: 57
End: 2022-08-02

## 2022-08-02 DIAGNOSIS — E11.65 TYPE 2 DIABETES MELLITUS WITH HYPERGLYCEMIA, WITH LONG-TERM CURRENT USE OF INSULIN (H): ICD-10-CM

## 2022-08-02 DIAGNOSIS — Z79.4 TYPE 2 DIABETES MELLITUS WITH HYPERGLYCEMIA, WITH LONG-TERM CURRENT USE OF INSULIN (H): ICD-10-CM

## 2022-08-02 RX ORDER — METFORMIN HCL 500 MG
500 TABLET, EXTENDED RELEASE 24 HR ORAL
Qty: 30 TABLET | Refills: 0 | Status: CANCELLED | OUTPATIENT
Start: 2022-08-02

## 2022-08-02 NOTE — TELEPHONE ENCOUNTER
Ashley Connelly PA-C Thome, Melanie M; Zunilda Lindo, RN  Caller: Unspecified (Today, 11:52 AM)  Pt has not been seen for a long time.   Chart notes allergy to Metformin.   I did not renew his Metformin today.

## 2022-08-02 NOTE — TELEPHONE ENCOUNTER
Thank you for looking into this!    Lenka Mabry CPhT  [facility]  [phone]

## 2022-08-02 NOTE — TELEPHONE ENCOUNTER
Reached out to clinic manager as per pt's appt desk- needs to speak to Endo clinic manager prior to scheduling appts.

## 2022-08-09 ENCOUNTER — PATIENT OUTREACH (OUTPATIENT)
Dept: SURGERY | Facility: CLINIC | Age: 57
End: 2022-08-09

## 2022-08-11 ENCOUNTER — HOSPITAL ENCOUNTER (EMERGENCY)
Facility: CLINIC | Age: 57
Discharge: HOME | End: 2022-08-11
Attending: EMERGENCY MEDICINE | Admitting: EMERGENCY MEDICINE
Payer: COMMERCIAL

## 2022-08-11 ENCOUNTER — DOCUMENTATION ONLY (OUTPATIENT)
Dept: ONCOLOGY | Facility: CLINIC | Age: 57
End: 2022-08-11

## 2022-08-11 ENCOUNTER — DOCUMENTATION ONLY (OUTPATIENT)
Dept: INFECTIOUS DISEASES | Facility: CLINIC | Age: 57
End: 2022-08-11

## 2022-08-11 VITALS
HEART RATE: 85 BPM | DIASTOLIC BLOOD PRESSURE: 87 MMHG | TEMPERATURE: 98.3 F | SYSTOLIC BLOOD PRESSURE: 136 MMHG | RESPIRATION RATE: 16 BRPM | OXYGEN SATURATION: 98 %

## 2022-08-11 DIAGNOSIS — Z79.4 ENCOUNTER FOR LONG-TERM (CURRENT) USE OF INSULIN (H): ICD-10-CM

## 2022-08-11 DIAGNOSIS — Z91.148 NONCOMPLIANCE WITH MEDICATION REGIMEN: ICD-10-CM

## 2022-08-11 DIAGNOSIS — R73.9 HYPERGLYCEMIA: ICD-10-CM

## 2022-08-11 LAB
ANION GAP SERPL CALCULATED.3IONS-SCNC: 14 MMOL/L (ref 7–15)
BASOPHILS # BLD AUTO: 0 10E3/UL (ref 0–0.2)
BASOPHILS NFR BLD AUTO: 1 %
BUN SERPL-MCNC: 16 MG/DL (ref 6–20)
CALCIUM SERPL-MCNC: 9.1 MG/DL (ref 8.6–10)
CHLORIDE SERPL-SCNC: 96 MMOL/L (ref 98–107)
CREAT SERPL-MCNC: 0.64 MG/DL (ref 0.67–1.17)
DEPRECATED HCO3 PLAS-SCNC: 22 MMOL/L (ref 22–29)
EOSINOPHIL # BLD AUTO: 0.1 10E3/UL (ref 0–0.7)
EOSINOPHIL NFR BLD AUTO: 2 %
ERYTHROCYTE [DISTWIDTH] IN BLOOD BY AUTOMATED COUNT: 15.6 % (ref 10–15)
GFR SERPL CREATININE-BSD FRML MDRD: >90 ML/MIN/1.73M2
GLUCOSE BLDC GLUCOMTR-MCNC: 346 MG/DL (ref 70–99)
GLUCOSE BLDC GLUCOMTR-MCNC: 586 MG/DL (ref 70–99)
GLUCOSE BLDC GLUCOMTR-MCNC: 588 MG/DL (ref 70–99)
GLUCOSE BLDC GLUCOMTR-MCNC: >600 MG/DL (ref 70–99)
GLUCOSE SERPL-MCNC: 600 MG/DL (ref 70–99)
HCT VFR BLD AUTO: 40.7 % (ref 40–53)
HGB BLD-MCNC: 13.4 G/DL (ref 13.3–17.7)
HOLD SPECIMEN: NORMAL
IMM GRANULOCYTES # BLD: 0.1 10E3/UL
IMM GRANULOCYTES NFR BLD: 1 %
KETONES BLD-SCNC: 0.1 MMOL/L (ref 0–0.6)
LYMPHOCYTES # BLD AUTO: 2.3 10E3/UL (ref 0.8–5.3)
LYMPHOCYTES NFR BLD AUTO: 33 %
MCH RBC QN AUTO: 27.6 PG (ref 26.5–33)
MCHC RBC AUTO-ENTMCNC: 32.9 G/DL (ref 31.5–36.5)
MCV RBC AUTO: 84 FL (ref 78–100)
MONOCYTES # BLD AUTO: 0.5 10E3/UL (ref 0–1.3)
MONOCYTES NFR BLD AUTO: 7 %
NEUTROPHILS # BLD AUTO: 4 10E3/UL (ref 1.6–8.3)
NEUTROPHILS NFR BLD AUTO: 56 %
NRBC # BLD AUTO: 0 10E3/UL
NRBC BLD AUTO-RTO: 0 /100
PLAT MORPH BLD: NORMAL
PLATELET # BLD AUTO: 269 10E3/UL (ref 150–450)
POTASSIUM SERPL-SCNC: 4.6 MMOL/L (ref 3.4–5.3)
RBC # BLD AUTO: 4.86 10E6/UL (ref 4.4–5.9)
RBC MORPH BLD: NORMAL
SODIUM SERPL-SCNC: 132 MMOL/L (ref 136–145)
WBC # BLD AUTO: 7.1 10E3/UL (ref 4–11)

## 2022-08-11 PROCEDURE — 80048 BASIC METABOLIC PNL TOTAL CA: CPT | Performed by: EMERGENCY MEDICINE

## 2022-08-11 PROCEDURE — 36415 COLL VENOUS BLD VENIPUNCTURE: CPT | Performed by: EMERGENCY MEDICINE

## 2022-08-11 PROCEDURE — 96372 THER/PROPH/DIAG INJ SC/IM: CPT | Performed by: EMERGENCY MEDICINE

## 2022-08-11 PROCEDURE — 96361 HYDRATE IV INFUSION ADD-ON: CPT

## 2022-08-11 PROCEDURE — 85004 AUTOMATED DIFF WBC COUNT: CPT | Performed by: EMERGENCY MEDICINE

## 2022-08-11 PROCEDURE — 258N000003 HC RX IP 258 OP 636: Performed by: EMERGENCY MEDICINE

## 2022-08-11 PROCEDURE — 99284 EMERGENCY DEPT VISIT MOD MDM: CPT | Mod: 25

## 2022-08-11 PROCEDURE — 82010 KETONE BODYS QUAN: CPT | Performed by: EMERGENCY MEDICINE

## 2022-08-11 PROCEDURE — 250N000013 HC RX MED GY IP 250 OP 250 PS 637: Performed by: EMERGENCY MEDICINE

## 2022-08-11 PROCEDURE — 250N000009 HC RX 250: Performed by: EMERGENCY MEDICINE

## 2022-08-11 PROCEDURE — 99284 EMERGENCY DEPT VISIT MOD MDM: CPT | Performed by: EMERGENCY MEDICINE

## 2022-08-11 PROCEDURE — 250N000012 HC RX MED GY IP 250 OP 636 PS 637: Performed by: EMERGENCY MEDICINE

## 2022-08-11 PROCEDURE — 96360 HYDRATION IV INFUSION INIT: CPT

## 2022-08-11 RX ORDER — SODIUM CHLORIDE 9 MG/ML
INJECTION, SOLUTION INTRAVENOUS CONTINUOUS
Status: DISCONTINUED | OUTPATIENT
Start: 2022-08-11 | End: 2022-08-11 | Stop reason: HOSPADM

## 2022-08-11 RX ORDER — SODIUM CHLORIDE, SODIUM LACTATE, POTASSIUM CHLORIDE, CALCIUM CHLORIDE 600; 310; 30; 20 MG/100ML; MG/100ML; MG/100ML; MG/100ML
INJECTION, SOLUTION INTRAVENOUS CONTINUOUS
Status: DISCONTINUED | OUTPATIENT
Start: 2022-08-11 | End: 2022-08-11 | Stop reason: HOSPADM

## 2022-08-11 RX ORDER — METFORMIN HCL 500 MG
500 TABLET, EXTENDED RELEASE 24 HR ORAL
Status: DISCONTINUED | OUTPATIENT
Start: 2022-08-11 | End: 2022-08-11 | Stop reason: HOSPADM

## 2022-08-11 RX ORDER — METFORMIN HCL 500 MG
500 TABLET, EXTENDED RELEASE 24 HR ORAL
Qty: 30 TABLET | Refills: 0 | Status: SHIPPED | OUTPATIENT
Start: 2022-08-11 | End: 2022-08-31

## 2022-08-11 RX ADMIN — BICTEGRAVIR SODIUM, EMTRICITABINE, AND TENOFOVIR ALAFENAMIDE FUMARATE 1 TABLET: 50; 200; 25 TABLET ORAL at 17:03

## 2022-08-11 RX ADMIN — SODIUM CHLORIDE, POTASSIUM CHLORIDE, SODIUM LACTATE AND CALCIUM CHLORIDE 1000 ML: 600; 310; 30; 20 INJECTION, SOLUTION INTRAVENOUS at 17:45

## 2022-08-11 RX ADMIN — SODIUM CHLORIDE, POTASSIUM CHLORIDE, SODIUM LACTATE AND CALCIUM CHLORIDE: 600; 310; 30; 20 INJECTION, SOLUTION INTRAVENOUS at 18:53

## 2022-08-11 RX ADMIN — METFORMIN ER 500 MG 500 MG: 500 TABLET ORAL at 20:56

## 2022-08-11 RX ADMIN — INSULIN ASPART 8 UNITS: 100 INJECTION, SOLUTION INTRAVENOUS; SUBCUTANEOUS at 17:03

## 2022-08-11 RX ADMIN — SODIUM CHLORIDE, POTASSIUM CHLORIDE, SODIUM LACTATE AND CALCIUM CHLORIDE 1000 ML: 600; 310; 30; 20 INJECTION, SOLUTION INTRAVENOUS at 16:23

## 2022-08-11 RX ADMIN — LIDOCAINE HYDROCHLORIDE 30 ML: 20 SOLUTION ORAL; TOPICAL at 16:59

## 2022-08-11 ASSESSMENT — ENCOUNTER SYMPTOMS
CONFUSION: 0
ABDOMINAL PAIN: 0
COLOR CHANGE: 0
ARTHRALGIAS: 0
FEVER: 0
NECK STIFFNESS: 0
HEADACHES: 0
DIFFICULTY URINATING: 0
SHORTNESS OF BREATH: 0
EYE REDNESS: 0

## 2022-08-11 ASSESSMENT — ACTIVITIES OF DAILY LIVING (ADL)
ADLS_ACUITY_SCORE: 37

## 2022-08-11 NOTE — DISCHARGE INSTRUCTIONS
Keep your medications up-to-date by regular clinic visits with your primary care provider  Running out of medications including your diabetes medications or your HIV medications can have severe consequences.

## 2022-08-11 NOTE — ED PROVIDER NOTES
[place] EMERGENCY DEPARTMENT ([facility])  8/11/22  History     Chief Complaint   Patient presents with     Hyperglycemia     The history is provided by the patient.     Andrew Lockwood is a 54 year old male who has a significant medical history of DM type II, HIV, AIDS, seizure disorder, amongst others who presents to the Emergency Department via EMS from clinic with c/o hypoglycemia.  EMS states that BG on scene was 577.  Here in the ED, the patient states that he stopped taking his metformin for 2 weeks, stating that he has been unable to get the medication [place]  He states that he went to the doctor today for prescription, but then was sent here for evaluation.    Past Medical History  Past Medical History:   Diagnosis Date     AIDS (H)      Allergic rhinitis due to other allergen     DNS     Anal dysplasia      Chronic abdominal pain      CNS toxoplasmosis (H)      Diabetes type 2, controlled (H)      GERD (gastroesophageal reflux disease)      History of seizure      History of substance abuse (H)      HIV (human immunodeficiency virus infection) (H)      HLD (hyperlipidemia)      Lung nodules      Periungual wart      PTSD (post-traumatic stress disorder)      Sleep apnea     doesn't use CPAP     Past Surgical History:   Procedure Laterality Date     ANOSCOPY N/A 9/9/2020    Procedure: Exam Under Anesthesia, ANOSCOPY, fulgeration of rectal fissures with Rectal Biopsies;  Surgeon: Thanh Lundberg MD;  Location: [facility] OR     COLONOSCOPY Left 1/22/2016    Procedure: COMBINED COLONOSCOPY, SINGLE OR MULTIPLE BIOPSY/POLYPECTOMY BY BIOPSY;  Surgeon: Clark Saini MD;  Location: [facility] GI     ESOPHAGOSCOPY, GASTROSCOPY, DUODENOSCOPY (EGD), COMBINED N/A 6/6/2022    Procedure: ESOPHAGOGASTRODUODENOSCOPY, WITH BIOPSY;  Surgeon: Kecia Benítez MD;  Location: [facility] GI     HC EXPLORE UNDESC TESTIS,INGUIN/SCROTAL       LAPAROSCOPIC APPENDECTOMY N/A 1/31/2018     "Procedure: LAPAROSCOPIC APPENDECTOMY;  LAPAROSCOPIC APPENDECTOMY;  Surgeon: Dawn Holt MD;  Location: [facility]     LAPAROSCOPY DIAGNOSTIC (GENERAL) N/A 7/26/2016    Procedure: LAPAROSCOPY DIAGNOSTIC (GENERAL);  Surgeon: Susannah Arriaga MD;  Location: [facility]     LAPAROSCOPY DIAGNOSTIC (GENERAL) N/A 4/16/2018    Procedure: LAPAROSCOPY DIAGNOSTIC (GENERAL);  Diagnostic laparoscopy and lysis of adhesions;  Surgeon: Prince Dowling MD;  Location: [facility] OR     OPTICAL TRACKING SYSTEM CRANIOTOMY, EXCISE TUMOR, COMBINED Left 4/10/2015    Procedure: COMBINED OPTICAL TRACKING SYSTEM CRANIOTOMY, EXCISE TUMOR;  Surgeon: Mirlande Colmenares MD;  Location: [facility] OR     REPAIR GAMEKEEPER'S THUMB Right 12/2/2016    Procedure: REPAIR LIGAMENT ULNAR COLLATERAL THUMB (GAMEKEEPER'S);  Surgeon: Evin Zamorano MD;  Location: [facility] OR     ZZC NONSPECIFIC PROCEDURE      right forearm fracture     metFORMIN (GLUCOPHAGE XR) 500 MG 24 hr tablet  Alcohol Swabs PADS  bictegravir-emtricitabine-tenofovir (BIKTARVY) -25 MG per tablet  blood glucose (NO BRAND SPECIFIED) test strip  blood glucose calibration (NO BRAND SPECIFIED) solution  blood glucose monitoring (NO BRAND SPECIFIED) meter device kit  buPROPion (WELLBUTRIN XL) 300 MG 24 hr tablet  insulin pen needle (BD SCOTT U/F) 32G X 4 MM miscellaneous  loperamide (IMODIUM A-D) 2 MG tablet  metFORMIN (GLUCOPHAGE XR) 500 MG 24 hr tablet  polyethylene glycol (MIRALAX) 17 GM/Dose powder  Sharps Container MISC  simethicone (MYLICON) 80 MG chewable tablet  STATIN NOT PRESCRIBED (INTENTIONAL)  thin (NO BRAND SPECIFIED) lancets      Allergies   Allergen Reactions     Metformin      Abdominal pain     Tylenol [Acetaminophen] Itching     Dulaglutide Rash     Insulin Lispro Rash     Patient reported     Penicillin V Other (See Comments) and Rash     Diffuse maculopapular rash + feels \"high\", per pt.      Family History  Family History   Problem Relation Age of Onset     Diabetes " Brother      Diabetes Father      Alzheimer Disease Father      Unknown/Adopted Mother      Diabetes Paternal Grandfather      Cancer No family hx of         no skin cancer     Skin Cancer No family hx of         no famiy hx of skin cancer     Glaucoma No family hx of      Macular Degeneration No family hx of      Social History   Social History     Tobacco Use     Smoking status: Current Every Day Smoker     Packs/day: 0.25     Years: 40.00     Pack years: 10.00     Types: Cigarettes     Smokeless tobacco: Former User   Substance Use Topics     Alcohol use: No     Alcohol/week: 0.0 standard drinks     Comment: Last etoh in 2007     Drug use: Not Currently     Types: Marijuana, Methamphetamines      Past medical history, past surgical history, medications, allergies, family history, and social history were reviewed with the patient. No additional pertinent items.     I have reviewed the Medications, Allergies, Past Medical and Surgical History, and Social History in the Epic system.    Review of Systems   Constitutional: Negative for fever.   HENT: Negative for congestion.    Eyes: Negative for redness.   Respiratory: Negative for shortness of breath.    Cardiovascular: Negative for chest pain.   Gastrointestinal: Negative for abdominal pain.   Genitourinary: Negative for difficulty urinating.   Musculoskeletal: Negative for arthralgias and neck stiffness.   Skin: Negative for color change.   Neurological: Negative for headaches.   Psychiatric/Behavioral: Negative for confusion.   All other systems reviewed and are negative.    A complete review of systems was performed with pertinent positives and negatives noted in the HPI, and all other systems negative.    Physical Exam   BP: 124/79  Pulse: 100  Temp: 98.3  F (36.8  C)  Resp: 16  SpO2: 97 %      Physical Exam  Vitals and nursing note reviewed.   Constitutional:       General: He is not in acute distress.     Appearance: He is well-developed.   HENT:      Head:  Normocephalic and atraumatic.      Mouth/Throat:      Mouth: Mucous membranes are moist.   Eyes:      General: No scleral icterus.     Conjunctiva/sclera: Conjunctivae normal.      Pupils: Pupils are equal, round, and reactive to light.   Cardiovascular:      Rate and Rhythm: Normal rate and regular rhythm.      Heart sounds: Normal heart sounds.   Pulmonary:      Effort: Pulmonary effort is normal. No respiratory distress.      Breath sounds: Normal breath sounds. No wheezing.   Abdominal:      General: Abdomen is flat.      Palpations: Abdomen is soft.   Musculoskeletal:      Cervical back: Neck supple.   Skin:     General: Skin is warm and dry.   Neurological:      General: No focal deficit present.      Mental Status: He is alert and oriented to person, place, and time.      Cranial Nerves: No cranial nerve deficit.   Psychiatric:         Attention and Perception: He is inattentive.         Mood and Affect: Mood normal. Affect is blunt.         Speech: Speech normal.         Behavior: Behavior is agitated.         ED Course     At 3:58 PM the patient was seen and examined by Thanh Lehman MD in Room ED02.        Procedures             No results found. However, due to the size of the patient record, not all encounters were searched. Please check Results Review for a complete set of results.  Medications   lactated ringers BOLUS 1,000 mL (0 mLs Intravenous Stopped 8/11/22 1853)     Followed by   lactated ringers BOLUS 1,000 mL (0 mLs Intravenous Stopped 8/11/22 1845)   insulin aspart (NovoLOG) injection (RAPID ACTING) (8 Units Subcutaneous Given 8/11/22 1703)   lidocaine (viscous) (XYLOCAINE) 2 % 15 mL, alum & mag hydroxide-simethicone (MAALOX) 15 mL GI Cocktail (30 mLs Oral Given 8/11/22 1659)   bictegravir-emtricitabine-tenofovir (BIKTARVY) -25 MG per tablet 1 tablet (1 tablet Oral Given 8/11/22 1703)             Assessments & Plan (with Medical Decision Making)   Andrew Lockwood is a 54 year old  male with history of insulin-dependent diabetes who has been noncompliant with his medications he does have a primary care provider.  He was in the clinic today and was found to have a blood sugar of 577 and its been 2 weeks since he has been on his medication so he was sent here.  He was given IV fluids, he was not in DKA he was also given insulin and we monitored his blood sugar we also gave him his HIV medication at his request his blood sugar came down to 200s.  He was given a prescription for metformin his other medications should come from his primary care provider.  He was given 2 L of saline and the insulin he feels well he has no electrolyte abnormality and will be discharged home we discussed the importance of euglycemia to prevent long-term complications of diabetes.  I have reviewed the nursing notes.    I have reviewed the findings, diagnosis, plan and need for follow up with the patient.    Discharge Medication List as of 8/11/2022  8:10 PM          Final diagnoses:   Hyperglycemia   Noncompliance with medication regimen       I, Amilcar Russell am serving as a trained medical scribe to document services personally performed by Thanh Lehman, based on the provider's statements to me.      I, Thanh Lehman, was physically present and have reviewed and verified the accuracy of this note documented by Amilcar Russell.     Thanh Lehman MD  8/11/2022   [facility] EMERGENCY DEPARTMENT     Thanh Lehman MD  08/21/22 0049

## 2022-08-11 NOTE — NURSING NOTE
Rapid Response Epic Documentation     Situation:  Patient found in exam room with staff.  Staff reports the patient is a known diabetic who has been without supplies or medications for a couple of weeks.  The patient reports that he feel like his sugars are high today.         Assessment:   Patient found to be alert and answering questions appropriately.  Iv established and fluids started.        BP:  121/83    Pulse: 101  Respiration: 22  SPO2: 97 %  Glucose: 577 mg/dl  Mental Status: Alert  CMS: Intact  Stroke Scale: Not Applicable  EKG: Not Performed      Treatment:    IV: Size 20 gauge,  Location R Hand      Location:    3C      Disposition:      Transfer to Emergency Room Via: 911    Protocol Used:     Other .

## 2022-08-11 NOTE — ED TRIAGE NOTES
Pt was at clinic, c/o fatigue and polyuria. BG was 577, sent to ER  Hasn't had diabetic medications for last 2 weeks     BG in triage: HI (^600)
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
## 2022-08-31 ENCOUNTER — TELEPHONE (OUTPATIENT)
Dept: INFECTIOUS DISEASES | Facility: CLINIC | Age: 57
End: 2022-08-31

## 2022-08-31 ENCOUNTER — OFFICE VISIT (OUTPATIENT)
Dept: PHARMACY | Facility: CLINIC | Age: 57
End: 2022-08-31
Payer: COMMERCIAL

## 2022-08-31 DIAGNOSIS — M79.644 PAIN OF RIGHT THUMB: ICD-10-CM

## 2022-08-31 DIAGNOSIS — E11.65 TYPE 2 DIABETES MELLITUS WITH HYPERGLYCEMIA, WITH LONG-TERM CURRENT USE OF INSULIN (H): Primary | ICD-10-CM

## 2022-08-31 DIAGNOSIS — Z21 ASYMPTOMATIC HUMAN IMMUNODEFICIENCY VIRUS (HIV) INFECTION STATUS (H): ICD-10-CM

## 2022-08-31 DIAGNOSIS — K21.00 GASTROESOPHAGEAL REFLUX DISEASE WITH ESOPHAGITIS, UNSPECIFIED WHETHER HEMORRHAGE: ICD-10-CM

## 2022-08-31 DIAGNOSIS — Z79.4 TYPE 2 DIABETES MELLITUS WITH HYPERGLYCEMIA, WITH LONG-TERM CURRENT USE OF INSULIN (H): Primary | ICD-10-CM

## 2022-08-31 PROCEDURE — 99607 MTMS BY PHARM ADDL 15 MIN: CPT | Performed by: PHARMACIST

## 2022-08-31 PROCEDURE — 99605 MTMS BY PHARM NP 15 MIN: CPT | Performed by: PHARMACIST

## 2022-08-31 RX ORDER — PANTOPRAZOLE SODIUM 20 MG/1
20 TABLET, DELAYED RELEASE ORAL 2 TIMES DAILY
Qty: 60 TABLET | Refills: 0 | Status: SHIPPED | OUTPATIENT
Start: 2022-08-31 | End: 2022-09-08

## 2022-08-31 RX ORDER — LANCETS
EACH MISCELLANEOUS
Qty: 100 EACH | Refills: 6 | Status: SHIPPED | OUTPATIENT
Start: 2022-08-31 | End: 2022-11-17

## 2022-08-31 RX ORDER — GLUCOSAMINE HCL/CHONDROITIN SU 500-400 MG
CAPSULE ORAL
Qty: 100 EACH | Refills: 3 | Status: SHIPPED | OUTPATIENT
Start: 2022-08-31 | End: 2022-11-17

## 2022-08-31 RX ORDER — METFORMIN HCL 500 MG
500 TABLET, EXTENDED RELEASE 24 HR ORAL DAILY
Qty: 30 TABLET | Refills: 0 | Status: SHIPPED | OUTPATIENT
Start: 2022-08-31 | End: 2022-10-13 | Stop reason: DRUGHIGH

## 2022-08-31 NOTE — PATIENT INSTRUCTIONS
"Recommendations from today's MTM visit:                                                    Today we reviewed what your medicines are for, how to know if they are working, that your medicines are safe and how to make your medicine regimen as easy as possible.      Schedule and keep an appointment with your infectious disease provider (Dr. Prieto or Dr. Ivory)  Schedule and keep an appointment with your primary care provider for your wrist  Schedule and keep an appointment with an endocrinologist for your diabetes   blood sugar testing supplies, if able. Talk to Lenka at the pharmacy   Check your blood sugar once every morning before breakfast   Check your blood sugar if you feel like it's low (sweaty, weak)  Continue taking metformin  mg once daily every morning for your blood sugar  Start taking pantoprazole 20 mg 2 times daily for heartburn symptoms per instructions     Follow-up: 4 weeks - I will review your chart and contact you if needed via email    It was great speaking with you today.  I value your experience and would be very thankful for your time in providing feedback in our clinic survey. In the next few days, you may receive an email or text message from [employer] with a link to a survey related to your  clinical pharmacist.\"     To schedule another MTM appointment, please call the clinic directly or you may call the MTM scheduling line at [phone] or toll-free at [phone].     My Clinical Pharmacist's contact information:                                                      Please feel free to contact me with any questions or concerns you have.      Dorie Holland, PharmD   Medication Therapy Management Pharmacist   August 31, 2022    "
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
## 2022-08-31 NOTE — Clinical Note
Simon, can you please set Andrew up with provider visits or ID, PCP, and Endo and then email him the dates and times? Otherwise he will not have access to refills. Thanks!

## 2022-09-06 ENCOUNTER — TELEPHONE (OUTPATIENT)
Dept: INFECTIOUS DISEASES | Facility: CLINIC | Age: 57
End: 2022-09-06

## 2022-09-06 NOTE — TELEPHONE ENCOUNTER
Called patient this AM to remind him of appointment today. No answer, unable to leave voicemail as voicemail is not set up. Patient was a no show to appointment today.

## 2022-09-08 ENCOUNTER — TELEPHONE (OUTPATIENT)
Dept: INFECTIOUS DISEASES | Facility: CLINIC | Age: 57
End: 2022-09-08

## 2022-09-08 ENCOUNTER — HOSPITAL ENCOUNTER (EMERGENCY)
Facility: CLINIC | Age: 57
Discharge: HOME | End: 2022-09-08
Attending: EMERGENCY MEDICINE | Admitting: EMERGENCY MEDICINE
Payer: COMMERCIAL

## 2022-09-08 ENCOUNTER — APPOINTMENT (OUTPATIENT)
Dept: GENERAL RADIOLOGY | Facility: CLINIC | Age: 57
End: 2022-09-08
Attending: EMERGENCY MEDICINE
Payer: COMMERCIAL

## 2022-09-08 VITALS
BODY MASS INDEX: 26.58 KG/M2 | OXYGEN SATURATION: 98 % | HEART RATE: 77 BPM | DIASTOLIC BLOOD PRESSURE: 84 MMHG | SYSTOLIC BLOOD PRESSURE: 118 MMHG | RESPIRATION RATE: 18 BRPM | HEIGHT: 63 IN | WEIGHT: 150 LBS | TEMPERATURE: 97.9 F

## 2022-09-08 DIAGNOSIS — Z00.00 HEALTH CARE MAINTENANCE: ICD-10-CM

## 2022-09-08 DIAGNOSIS — B20 HUMAN IMMUNODEFICIENCY VIRUS (HIV) DISEASE (H): Primary | ICD-10-CM

## 2022-09-08 DIAGNOSIS — K21.00 GASTROESOPHAGEAL REFLUX DISEASE WITH ESOPHAGITIS, UNSPECIFIED WHETHER HEMORRHAGE: ICD-10-CM

## 2022-09-08 DIAGNOSIS — R73.9 HYPERGLYCEMIA: ICD-10-CM

## 2022-09-08 DIAGNOSIS — R07.9 CHEST PAIN, UNSPECIFIED TYPE: ICD-10-CM

## 2022-09-08 DIAGNOSIS — Z11.3 SCREEN FOR STD (SEXUALLY TRANSMITTED DISEASE): ICD-10-CM

## 2022-09-08 DIAGNOSIS — B20 HUMAN IMMUNODEFICIENCY VIRUS (HIV) DISEASE (H): ICD-10-CM

## 2022-09-08 LAB
ALBUMIN SERPL BCG-MCNC: 3.8 G/DL (ref 3.5–5.2)
ALBUMIN SERPL BCG-MCNC: 3.9 G/DL (ref 3.5–5.2)
ALP SERPL-CCNC: 148 U/L (ref 40–129)
ALP SERPL-CCNC: 157 U/L (ref 40–129)
ALT SERPL W P-5'-P-CCNC: 11 U/L (ref 10–50)
ALT SERPL W P-5'-P-CCNC: 14 U/L (ref 10–50)
ANION GAP SERPL CALCULATED.3IONS-SCNC: 12 MMOL/L (ref 7–15)
ANION GAP SERPL CALCULATED.3IONS-SCNC: 14 MMOL/L (ref 7–15)
AST SERPL W P-5'-P-CCNC: 15 U/L (ref 10–50)
AST SERPL W P-5'-P-CCNC: 26 U/L (ref 10–50)
ATRIAL RATE - MUSE: 87 BPM
BASE EXCESS BLDV CALC-SCNC: -2.5 MMOL/L (ref -7.7–1.9)
BASOPHILS # BLD AUTO: 0 10E3/UL (ref 0–0.2)
BASOPHILS NFR BLD AUTO: 1 %
BILIRUB SERPL-MCNC: 0.4 MG/DL
BILIRUB SERPL-MCNC: 0.4 MG/DL
BUN SERPL-MCNC: 17.6 MG/DL (ref 6–20)
BUN SERPL-MCNC: 19.9 MG/DL (ref 6–20)
CALCIUM SERPL-MCNC: 8.9 MG/DL (ref 8.6–10)
CALCIUM SERPL-MCNC: 9.1 MG/DL (ref 8.6–10)
CD3 CELLS # BLD: 1104 CELLS/UL (ref 603–2990)
CD3 CELLS NFR BLD: 57 % (ref 49–84)
CD3+CD4+ CELLS # BLD: 365 CELLS/UL (ref 441–2156)
CD3+CD4+ CELLS NFR BLD: 19 % (ref 28–63)
CD3+CD4+ CELLS/CD3+CD8+ CLL BLD: 0.52 % (ref 1.4–2.6)
CD3+CD8+ CELLS # BLD: 701 CELLS/UL (ref 125–1312)
CD3+CD8+ CELLS NFR BLD: 36 % (ref 10–40)
CHLORIDE SERPL-SCNC: 91 MMOL/L (ref 98–107)
CHLORIDE SERPL-SCNC: 97 MMOL/L (ref 98–107)
CREAT SERPL-MCNC: 0.63 MG/DL (ref 0.67–1.17)
CREAT SERPL-MCNC: 0.75 MG/DL (ref 0.67–1.17)
DEPRECATED HCO3 PLAS-SCNC: 19 MMOL/L (ref 22–29)
DEPRECATED HCO3 PLAS-SCNC: 21 MMOL/L (ref 22–29)
DIASTOLIC BLOOD PRESSURE - MUSE: NORMAL MMHG
EOSINOPHIL # BLD AUTO: 0.1 10E3/UL (ref 0–0.7)
EOSINOPHIL NFR BLD AUTO: 2 %
ERYTHROCYTE [DISTWIDTH] IN BLOOD BY AUTOMATED COUNT: 15.3 % (ref 10–15)
GFR SERPL CREATININE-BSD FRML MDRD: >90 ML/MIN/1.73M2
GFR SERPL CREATININE-BSD FRML MDRD: >90 ML/MIN/1.73M2
GLUCOSE BLDC GLUCOMTR-MCNC: 267 MG/DL (ref 70–99)
GLUCOSE SERPL-MCNC: 594 MG/DL (ref 70–99)
GLUCOSE SERPL-MCNC: 781 MG/DL (ref 70–99)
HCO3 BLDV-SCNC: 23 MMOL/L (ref 21–28)
HCT VFR BLD AUTO: 41.1 % (ref 40–53)
HGB BLD-MCNC: 14 G/DL (ref 13.3–17.7)
IMM GRANULOCYTES # BLD: 0.1 10E3/UL
IMM GRANULOCYTES NFR BLD: 1 %
INTERPRETATION ECG - MUSE: NORMAL
KETONES BLD-SCNC: 0.1 MMOL/L (ref 0–0.6)
LIPASE SERPL-CCNC: 57 U/L (ref 13–60)
LYMPHOCYTES # BLD AUTO: 2 10E3/UL (ref 0.8–5.3)
LYMPHOCYTES NFR BLD AUTO: 32 %
MAGNESIUM SERPL-MCNC: 1.7 MG/DL (ref 1.7–2.3)
MCH RBC QN AUTO: 28.4 PG (ref 26.5–33)
MCHC RBC AUTO-ENTMCNC: 34.1 G/DL (ref 31.5–36.5)
MCV RBC AUTO: 83 FL (ref 78–100)
MONOCYTES # BLD AUTO: 0.5 10E3/UL (ref 0–1.3)
MONOCYTES NFR BLD AUTO: 8 %
NEUTROPHILS # BLD AUTO: 3.5 10E3/UL (ref 1.6–8.3)
NEUTROPHILS NFR BLD AUTO: 56 %
NRBC # BLD AUTO: 0 10E3/UL
NRBC BLD AUTO-RTO: 0 /100
O2/TOTAL GAS SETTING VFR VENT: 16 %
OSMOLALITY SERPL: 308 MMOL/KG (ref 275–295)
P AXIS - MUSE: 35 DEGREES
PCO2 BLDV: 43 MM HG (ref 40–50)
PH BLDV: 7.34 [PH] (ref 7.32–7.43)
PLATELET # BLD AUTO: 309 10E3/UL (ref 150–450)
PO2 BLDV: 53 MM HG (ref 25–47)
POTASSIUM SERPL-SCNC: 3.9 MMOL/L (ref 3.4–5.3)
POTASSIUM SERPL-SCNC: 4.1 MMOL/L (ref 3.4–5.3)
PR INTERVAL - MUSE: 134 MS
PROT SERPL-MCNC: 6.6 G/DL (ref 6.4–8.3)
PROT SERPL-MCNC: 6.8 G/DL (ref 6.4–8.3)
QRS DURATION - MUSE: 92 MS
QT - MUSE: 370 MS
QTC - MUSE: 445 MS
R AXIS - MUSE: -28 DEGREES
RBC # BLD AUTO: 4.93 10E6/UL (ref 4.4–5.9)
SODIUM SERPL-SCNC: 126 MMOL/L (ref 136–145)
SODIUM SERPL-SCNC: 128 MMOL/L (ref 136–145)
SYSTOLIC BLOOD PRESSURE - MUSE: NORMAL MMHG
T AXIS - MUSE: 19 DEGREES
T CELL COMMENT: ABNORMAL
TROPONIN T SERPL HS-MCNC: 7 NG/L
VENTRICULAR RATE- MUSE: 87 BPM
WBC # BLD AUTO: 6.2 10E3/UL (ref 4–11)

## 2022-09-08 PROCEDURE — 250N000009 HC RX 250: Performed by: EMERGENCY MEDICINE

## 2022-09-08 PROCEDURE — 83735 ASSAY OF MAGNESIUM: CPT | Performed by: EMERGENCY MEDICINE

## 2022-09-08 PROCEDURE — 258N000003 HC RX IP 258 OP 636: Performed by: EMERGENCY MEDICINE

## 2022-09-08 PROCEDURE — 80053 COMPREHEN METABOLIC PANEL: CPT | Performed by: EMERGENCY MEDICINE

## 2022-09-08 PROCEDURE — 93010 ELECTROCARDIOGRAM REPORT: CPT | Performed by: EMERGENCY MEDICINE

## 2022-09-08 PROCEDURE — 83690 ASSAY OF LIPASE: CPT | Performed by: EMERGENCY MEDICINE

## 2022-09-08 PROCEDURE — 82803 BLOOD GASES ANY COMBINATION: CPT | Performed by: EMERGENCY MEDICINE

## 2022-09-08 PROCEDURE — 71046 X-RAY EXAM CHEST 2 VIEWS: CPT | Mod: 26 | Performed by: RADIOLOGY

## 2022-09-08 PROCEDURE — 85014 HEMATOCRIT: CPT | Performed by: EMERGENCY MEDICINE

## 2022-09-08 PROCEDURE — 250N000012 HC RX MED GY IP 250 OP 636 PS 637: Performed by: EMERGENCY MEDICINE

## 2022-09-08 PROCEDURE — 99285 EMERGENCY DEPT VISIT HI MDM: CPT | Mod: 25 | Performed by: EMERGENCY MEDICINE

## 2022-09-08 PROCEDURE — 82010 KETONE BODYS QUAN: CPT | Performed by: EMERGENCY MEDICINE

## 2022-09-08 PROCEDURE — C9113 INJ PANTOPRAZOLE SODIUM, VIA: HCPCS | Performed by: EMERGENCY MEDICINE

## 2022-09-08 PROCEDURE — 36415 COLL VENOUS BLD VENIPUNCTURE: CPT | Performed by: EMERGENCY MEDICINE

## 2022-09-08 PROCEDURE — 84484 ASSAY OF TROPONIN QUANT: CPT | Performed by: EMERGENCY MEDICINE

## 2022-09-08 PROCEDURE — C9803 HOPD COVID-19 SPEC COLLECT: HCPCS | Performed by: EMERGENCY MEDICINE

## 2022-09-08 PROCEDURE — 96372 THER/PROPH/DIAG INJ SC/IM: CPT | Performed by: EMERGENCY MEDICINE

## 2022-09-08 PROCEDURE — 71046 X-RAY EXAM CHEST 2 VIEWS: CPT

## 2022-09-08 PROCEDURE — 86360 T CELL ABSOLUTE COUNT/RATIO: CPT | Performed by: INTERNAL MEDICINE

## 2022-09-08 PROCEDURE — 93005 ELECTROCARDIOGRAM TRACING: CPT | Performed by: EMERGENCY MEDICINE

## 2022-09-08 PROCEDURE — 250N000013 HC RX MED GY IP 250 OP 250 PS 637: Performed by: EMERGENCY MEDICINE

## 2022-09-08 PROCEDURE — 250N000011 HC RX IP 250 OP 636: Performed by: EMERGENCY MEDICINE

## 2022-09-08 PROCEDURE — 84155 ASSAY OF PROTEIN SERUM: CPT | Performed by: INTERNAL MEDICINE

## 2022-09-08 PROCEDURE — 83930 ASSAY OF BLOOD OSMOLALITY: CPT | Performed by: EMERGENCY MEDICINE

## 2022-09-08 RX ORDER — SODIUM CHLORIDE 9 MG/ML
INJECTION, SOLUTION INTRAVENOUS CONTINUOUS
Status: DISCONTINUED | OUTPATIENT
Start: 2022-09-08 | End: 2022-09-08 | Stop reason: HOSPADM

## 2022-09-08 RX ORDER — PANTOPRAZOLE SODIUM 20 MG/1
20 TABLET, DELAYED RELEASE ORAL 2 TIMES DAILY
Qty: 60 TABLET | Refills: 0 | Status: ON HOLD | OUTPATIENT
Start: 2022-09-08 | End: 2022-11-18

## 2022-09-08 RX ADMIN — INSULIN ASPART 8 UNITS: 100 INJECTION, SOLUTION INTRAVENOUS; SUBCUTANEOUS at 11:46

## 2022-09-08 RX ADMIN — PANTOPRAZOLE SODIUM 40 MG: 40 INJECTION, POWDER, FOR SOLUTION INTRAVENOUS at 06:23

## 2022-09-08 RX ADMIN — SODIUM CHLORIDE 1000 ML: 9 INJECTION, SOLUTION INTRAVENOUS at 08:47

## 2022-09-08 RX ADMIN — SODIUM CHLORIDE: 900 INJECTION, SOLUTION INTRAVENOUS at 14:00

## 2022-09-08 RX ADMIN — SODIUM CHLORIDE 1000 ML: 9 INJECTION, SOLUTION INTRAVENOUS at 06:23

## 2022-09-08 RX ADMIN — LIDOCAINE HYDROCHLORIDE 30 ML: 20 SOLUTION ORAL; TOPICAL at 08:54

## 2022-09-08 ASSESSMENT — ACTIVITIES OF DAILY LIVING (ADL)
ADLS_ACUITY_SCORE: 37

## 2022-09-08 NOTE — TELEPHONE ENCOUNTER
Clinic , Simon, noticed pt was at the ED today and so called to ask them to add Routine HIV lab orders to the lab draw they were already doing there. Also let them know about new future apt with Dr Prieto 10/4.  Dalila Wilson RN

## 2022-09-08 NOTE — ED NOTES
Patient received as sign-out at change of shift.  Please see initial note for complete details.  54 year old male who presented to the ED with chest pain felt related to dyspepsia/GERD after evaluation but also found to be hyperglycemic with a blood sugar of 800.  Patient given IV fluids and a single dose of NovoLog insulin.  His blood sugar improved to 266.  He states he has been taking his medications as directed but has been noncompliant in the past.  He was encouraged to be sure he takes his metformin as directed.  He will need close follow-up with his primary care clinic for further management of his blood sugar.  Patient otherwise appears clinically well and appropriate for outpatient management.  Discharge to home with above plan.     Wojciech Mackay MD  09/08/22 5907

## 2022-09-08 NOTE — ED TRIAGE NOTES
Arrived ambulatory complaining of chest pain due to reflux. States he has had problems similar in the past. Rates pain 9/10     Triage Assessment     Row Name 09/08/22 0607       Triage Assessment (Adult)    Airway WDL WDL       Respiratory WDL    Respiratory WDL WDL       Skin Circulation/Temperature WDL    Skin Circulation/Temperature WDL WDL       Cardiac WDL    Cardiac WDL WDL       Peripheral/Neurovascular WDL    Peripheral Neurovascular WDL WDL       Cognitive/Neuro/Behavioral WDL    Cognitive/Neuro/Behavioral WDL WDL

## 2022-09-08 NOTE — ED PROVIDER NOTES
ED Provider Note  [facility]      History   CC: Chest pain  HPI  Andrew Lockwood is a 54 year old male who has a PMHx of GERD, HIV, DM2 on metformin presenting with burning pain from the bottom of his chest to mid chest in the middle. Started at 0000 today. No vomiting or fevers. He is having normal bowel movements. No radiation to arm, jaw or back. No exertional symptoms. No sweats, fevers, cough, SOB. No pleuritic pain, no leg swelling. Patient taking his DM and HIV meds. Denies drugs/etoh. Patient is unsure what has caused this pain. He states it is similar to prior GERD episodes. He denies that this is similar to bowel obstructions in the past. He is urinating normally without dysuria or hematuria. No trauma.     Past Medical History  Past Medical History:   Diagnosis Date     AIDS (H)      Allergic rhinitis due to other allergen     DNS     Anal dysplasia      Chronic abdominal pain      CNS toxoplasmosis (H)      Diabetes type 2, controlled (H)      GERD (gastroesophageal reflux disease)      History of seizure      History of substance abuse (H)      HIV (human immunodeficiency virus infection) (H)      HLD (hyperlipidemia)      Lung nodules      Periungual wart      PTSD (post-traumatic stress disorder)      Sleep apnea     doesn't use CPAP     Past Surgical History:   Procedure Laterality Date     ANOSCOPY N/A 9/9/2020    Procedure: Exam Under Anesthesia, ANOSCOPY, fulgeration of rectal fissures with Rectal Biopsies;  Surgeon: Thanh Lundberg MD;  Location: [facility]     COLONOSCOPY Left 1/22/2016    Procedure: COMBINED COLONOSCOPY, SINGLE OR MULTIPLE BIOPSY/POLYPECTOMY BY BIOPSY;  Surgeon: Clark Saini MD;  [facility]     ESOPHAGOSCOPY, GASTROSCOPY, DUODENOSCOPY (EGD), COMBINED N/A 6/6/2022    Procedure: ESOPHAGOGASTRODUODENOSCOPY, WITH BIOPSY;  Surgeon: Kecia Benítez MD;  [facility]     HC EXPLORE UNDESC TESTIS,INGUIN/SCROTAL       LAPAROSCOPIC APPENDECTOMY N/A  "1/31/2018    Procedure: LAPAROSCOPIC APPENDECTOMY;  LAPAROSCOPIC APPENDECTOMY;  Surgeon: Dawn Holt MD;  Location: [facility]     LAPAROSCOPY DIAGNOSTIC (GENERAL) N/A 7/26/2016    Procedure: LAPAROSCOPY DIAGNOSTIC (GENERAL);  Surgeon: Susannah Arriaga MD;  Location: [facility]     LAPAROSCOPY DIAGNOSTIC (GENERAL) N/A 4/16/2018    Procedure: LAPAROSCOPY DIAGNOSTIC (GENERAL);  Diagnostic laparoscopy and lysis of adhesions;  Surgeon: Prince Dowling MD;  Location: [facility]     OPTICAL TRACKING SYSTEM CRANIOTOMY, EXCISE TUMOR, COMBINED Left 4/10/2015    Procedure: COMBINED OPTICAL TRACKING SYSTEM CRANIOTOMY, EXCISE TUMOR;  Surgeon: Mirlande Colmenares MD;  Location: [facility]     REPAIR GAMEKEEPER'S THUMB Right 12/2/2016    Procedure: REPAIR LIGAMENT ULNAR COLLATERAL THUMB (GAMEKEEPER'S);  Surgeon: Evin Zamorano MD;  Location: [facility]     ZZC NONSPECIFIC PROCEDURE      right forearm fracture     alcohol swab prep pads  bictegravir-emtricitabine-tenofovir (BIKTARVY) -25 MG per tablet  blood glucose (NO BRAND SPECIFIED) test strip  blood glucose monitoring (NO BRAND SPECIFIED) meter device kit  metFORMIN (GLUCOPHAGE XR) 500 MG 24 hr tablet  pantoprazole (PROTONIX) 20 MG EC tablet  STATIN NOT PRESCRIBED (INTENTIONAL)  thin (NO BRAND SPECIFIED) lancets      Allergies   Allergen Reactions     Metformin      Abdominal pain     Tylenol [Acetaminophen] Itching     Dulaglutide Rash     Insulin Lispro Rash     Patient reported     Penicillin V Other (See Comments) and Rash     Diffuse maculopapular rash + feels \"high\", per pt.      Family History  Family History   Problem Relation Age of Onset     Diabetes Brother      Diabetes Father      Alzheimer Disease Father      Unknown/Adopted Mother      Diabetes Paternal Grandfather      Cancer No family hx of         no skin cancer     Skin Cancer No family hx of         no famiy hx of skin cancer     Glaucoma No family hx of      Macular Degeneration No family " hx of      Social History   Social History     Tobacco Use     Smoking status: Current Some Day Smoker     Packs/day: 0.25     Years: 40.00     Pack years: 10.00     Types: Cigarettes     Smokeless tobacco: Former User   Substance Use Topics     Alcohol use: No     Alcohol/week: 0.0 standard drinks     Comment: Last etoh in 2007     Drug use: Not Currently     Types: Marijuana, Methamphetamines      Past medical history, past surgical history, medications, allergies, family history, and social history were reviewed with the patient. No additional pertinent items.       Review of Systems  A complete review of systems was performed with pertinent positives and negatives noted in the HPI, and all other systems negative.    Physical Exam      Physical Exam  Physical Exam   Constitutional: oriented to person, place, and time. appears well-developed and well-nourished.   HENT:   Head: Normocephalic and atraumatic.   Neck: Normal range of motion.   Pulmonary/Chest: Effort normal. No respiratory distress. Clear lungs bilaterally.  Cardiac: No murmurs, rubs, gallops. RRR.  Abdominal: Abdomen soft, nontender, nondistended. No rebound tenderness.  MSK: Long bones without deformity or evidence of trauma  Neurological: alert and oriented to person, place, and time.   Skin: Skin is warm and dry.   Psychiatric:  normal mood and affect.  behavior is normal. Thought content normal.     ED Course      Procedures            EKG Interpretation:      Interpreted by Venu Wolff MD  Time reviewed: 0611  Symptoms at time of EKG: chest pain   Rhythm: normal sinus   Rate: normal  Axis: normal  Ectopy: none  Conduction: normal  ST Segments/ T Waves: No ST-T wave changes  Q Waves: none  Comparison to prior: Unchanged    Clinical Impression: normal EKG. No signs of ischemia/infarction      Results for orders placed or performed during the hospital encounter of 09/08/22   Lipase     Status: Normal   Result Value Ref Range    Lipase 57 13 -  60 U/L   Troponin T, High Sensitivity     Status: Normal   Result Value Ref Range    Troponin T, High Sensitivity 7 <=22 ng/L   CBC with platelets and differential     Status: Abnormal   Result Value Ref Range    WBC Count 6.2 4.0 - 11.0 10e3/uL    RBC Count 4.93 4.40 - 5.90 10e6/uL    Hemoglobin 14.0 13.3 - 17.7 g/dL    Hematocrit 41.1 40.0 - 53.0 %    MCV 83 78 - 100 fL    MCH 28.4 26.5 - 33.0 pg    MCHC 34.1 31.5 - 36.5 g/dL    RDW 15.3 (H) 10.0 - 15.0 %    Platelet Count 309 150 - 450 10e3/uL    % Neutrophils 56 %    % Lymphocytes 32 %    % Monocytes 8 %    % Eosinophils 2 %    % Basophils 1 %    % Immature Granulocytes 1 %    NRBCs per 100 WBC 0 <1 /100    Absolute Neutrophils 3.5 1.6 - 8.3 10e3/uL    Absolute Lymphocytes 2.0 0.8 - 5.3 10e3/uL    Absolute Monocytes 0.5 0.0 - 1.3 10e3/uL    Absolute Eosinophils 0.1 0.0 - 0.7 10e3/uL    Absolute Basophils 0.0 0.0 - 0.2 10e3/uL    Absolute Immature Granulocytes 0.1 <=0.4 10e3/uL    Absolute NRBCs 0.0 10e3/uL   EKG 12-lead, tracing only     Status: None (Preliminary result)   Result Value Ref Range    Systolic Blood Pressure  mmHg    Diastolic Blood Pressure  mmHg    Ventricular Rate 87 BPM    Atrial Rate 87 BPM    NH Interval 134 ms    QRS Duration 92 ms     ms    QTc 445 ms    P Axis 35 degrees    R AXIS -28 degrees    T Axis 19 degrees    Interpretation ECG        Poor data quality, interpretation may be adversely affected  Sinus rhythm  Normal ECG     CBC with platelets differential     Status: Abnormal    Narrative    The following orders were created for panel order CBC with platelets differential.  Procedure                               Abnormality         Status                     ---------                               -----------         ------                     CBC with platelets and d...[[id number]]  Abnormal            Final result                 Please view results for these tests on the individual orders.            No results found for any  visits on 09/08/22.  Medications   pantoprazole (PROTONIX) IV push injection 40 mg (has no administration in time range)   0.9% sodium chloride BOLUS (has no administration in time range)     Followed by   sodium chloride 0.9% infusion (has no administration in time range)        Assessments & Plan (with Medical Decision Making)   MDM  Patient here with reports of symptoms related to GERD. ECG reassuring, troponin w/in normal limits for sex. Symptoms present over 6 hours of trop, very unlikely ACS. No SOB, pleurisy, low risk by wells and negative PERC, unlikely PE. No tearing/ripping pain or neuro symptoms, unlikely dissection. He has no vomiting and is having normal BM's, unlikely SBO. Patient given pantoprazole with improvement of symptoms. GI cocktail pending. Will need CXR, CMP and CXR. If normal anticipate discharge. Patient signed out to oncoming provider pending workup.    I have reviewed the nursing notes. I have reviewed the findings, diagnosis, plan and need for follow up with the patient.    New Prescriptions    No medications on file       Final diagnoses:   Chest pain, unspecified type       --  Venu Wolff  [facility] EMERGENCY DEPARTMENT  9/8/2022     Venu Wolff MD  09/08/22 0718

## 2022-09-08 NOTE — DISCHARGE INSTRUCTIONS
Take Protonix as directed.  Be sure to take your metformin twice daily as directed.    Follow-up with your primary care clinic as soon as possible.    Return if any concerns.

## 2022-10-03 ENCOUNTER — TELEPHONE (OUTPATIENT)
Dept: INFECTIOUS DISEASES | Facility: CLINIC | Age: 57
End: 2022-10-03

## 2022-10-13 ENCOUNTER — LAB (OUTPATIENT)
Dept: LAB | Facility: CLINIC | Age: 57
End: 2022-10-13
Payer: COMMERCIAL

## 2022-10-13 ENCOUNTER — DOCUMENTATION ONLY (OUTPATIENT)
Dept: INFECTIOUS DISEASES | Facility: CLINIC | Age: 57
End: 2022-10-13

## 2022-10-13 DIAGNOSIS — Z79.4 TYPE 2 DIABETES MELLITUS WITH HYPERGLYCEMIA, WITH LONG-TERM CURRENT USE OF INSULIN (H): ICD-10-CM

## 2022-10-13 DIAGNOSIS — Z11.3 SCREEN FOR STD (SEXUALLY TRANSMITTED DISEASE): ICD-10-CM

## 2022-10-13 DIAGNOSIS — E11.65 TYPE 2 DIABETES MELLITUS WITH HYPERGLYCEMIA, WITH LONG-TERM CURRENT USE OF INSULIN (H): ICD-10-CM

## 2022-10-13 DIAGNOSIS — E11.65 TYPE 2 DIABETES MELLITUS WITH HYPERGLYCEMIA, WITH LONG-TERM CURRENT USE OF INSULIN (H): Primary | ICD-10-CM

## 2022-10-13 DIAGNOSIS — B20 HUMAN IMMUNODEFICIENCY VIRUS (HIV) DISEASE (H): ICD-10-CM

## 2022-10-13 DIAGNOSIS — Z79.4 TYPE 2 DIABETES MELLITUS WITH HYPERGLYCEMIA, WITH LONG-TERM CURRENT USE OF INSULIN (H): Primary | ICD-10-CM

## 2022-10-13 DIAGNOSIS — B20 HUMAN IMMUNODEFICIENCY VIRUS (HIV) DISEASE (H): Primary | ICD-10-CM

## 2022-10-13 LAB
ALBUMIN SERPL BCG-MCNC: 4.2 G/DL (ref 3.5–5.2)
ALP SERPL-CCNC: 122 U/L (ref 40–129)
ALT SERPL W P-5'-P-CCNC: 25 U/L (ref 10–50)
ANION GAP SERPL CALCULATED.3IONS-SCNC: 11 MMOL/L (ref 7–15)
AST SERPL W P-5'-P-CCNC: 20 U/L (ref 10–50)
BASOPHILS # BLD AUTO: 0.1 10E3/UL (ref 0–0.2)
BASOPHILS NFR BLD AUTO: 1 %
BILIRUB SERPL-MCNC: 0.6 MG/DL
BUN SERPL-MCNC: 17.9 MG/DL (ref 6–20)
CALCIUM SERPL-MCNC: 10.1 MG/DL (ref 8.6–10)
CHLORIDE SERPL-SCNC: 95 MMOL/L (ref 98–107)
CHOLEST SERPL-MCNC: 208 MG/DL
CHOLEST SERPL-MCNC: NORMAL MG/DL
CREAT SERPL-MCNC: 0.68 MG/DL (ref 0.67–1.17)
DEPRECATED HCO3 PLAS-SCNC: 25 MMOL/L (ref 22–29)
EOSINOPHIL # BLD AUTO: 0.1 10E3/UL (ref 0–0.7)
EOSINOPHIL NFR BLD AUTO: 2 %
ERYTHROCYTE [DISTWIDTH] IN BLOOD BY AUTOMATED COUNT: 14.6 % (ref 10–15)
GFR SERPL CREATININE-BSD FRML MDRD: >90 ML/MIN/1.73M2
GLUCOSE SERPL-MCNC: 475 MG/DL (ref 70–99)
HCT VFR BLD AUTO: 42 % (ref 40–53)
HDLC SERPL-MCNC: 50 MG/DL
HDLC SERPL-MCNC: NORMAL MG/DL
HGB BLD-MCNC: 14.6 G/DL (ref 13.3–17.7)
IMM GRANULOCYTES # BLD: 0.1 10E3/UL
IMM GRANULOCYTES NFR BLD: 1 %
LDLC SERPL CALC-MCNC: 106 MG/DL
LDLC SERPL CALC-MCNC: NORMAL MG/DL
LYMPHOCYTES # BLD AUTO: 2.5 10E3/UL (ref 0.8–5.3)
LYMPHOCYTES NFR BLD AUTO: 33 %
MCH RBC QN AUTO: 29.7 PG (ref 26.5–33)
MCHC RBC AUTO-ENTMCNC: 34.8 G/DL (ref 31.5–36.5)
MCV RBC AUTO: 85 FL (ref 78–100)
MONOCYTES # BLD AUTO: 0.5 10E3/UL (ref 0–1.3)
MONOCYTES NFR BLD AUTO: 6 %
NEUTROPHILS # BLD AUTO: 4.3 10E3/UL (ref 1.6–8.3)
NEUTROPHILS NFR BLD AUTO: 57 %
NONHDLC SERPL-MCNC: 158 MG/DL
NONHDLC SERPL-MCNC: NORMAL MG/DL
NRBC # BLD AUTO: 0 10E3/UL
NRBC BLD AUTO-RTO: 0 /100
PLATELET # BLD AUTO: 375 10E3/UL (ref 150–450)
POTASSIUM SERPL-SCNC: 4.2 MMOL/L (ref 3.4–5.3)
PROT SERPL-MCNC: 7.3 G/DL (ref 6.4–8.3)
RBC # BLD AUTO: 4.92 10E6/UL (ref 4.4–5.9)
SODIUM SERPL-SCNC: 131 MMOL/L (ref 136–145)
TRIGL SERPL-MCNC: 259 MG/DL
TRIGL SERPL-MCNC: NORMAL MG/DL
WBC # BLD AUTO: 7.5 10E3/UL (ref 4–11)

## 2022-10-13 PROCEDURE — 87536 HIV-1 QUANT&REVRSE TRNSCRPJ: CPT | Performed by: PATHOLOGY

## 2022-10-13 PROCEDURE — 80053 COMPREHEN METABOLIC PANEL: CPT | Performed by: PATHOLOGY

## 2022-10-13 PROCEDURE — 86780 TREPONEMA PALLIDUM: CPT | Performed by: PATHOLOGY

## 2022-10-13 PROCEDURE — 99000 SPECIMEN HANDLING OFFICE-LAB: CPT | Performed by: PATHOLOGY

## 2022-10-13 PROCEDURE — 80061 LIPID PANEL: CPT | Performed by: PATHOLOGY

## 2022-10-13 PROCEDURE — 36415 COLL VENOUS BLD VENIPUNCTURE: CPT | Performed by: PATHOLOGY

## 2022-10-13 PROCEDURE — 85025 COMPLETE CBC W/AUTO DIFF WBC: CPT | Performed by: PATHOLOGY

## 2022-10-13 PROCEDURE — 86803 HEPATITIS C AB TEST: CPT | Performed by: PATHOLOGY

## 2022-10-13 RX ORDER — METFORMIN HCL 500 MG
1000 TABLET, EXTENDED RELEASE 24 HR ORAL DAILY
Qty: 60 TABLET | Refills: 0 | Status: ON HOLD | OUTPATIENT
Start: 2022-10-13 | End: 2022-11-18

## 2022-10-13 NOTE — PROGRESS NOTES
Patient presented to clinic today as a walk in. Patients reason for visit was Metformin refills. Patient expressed anger with himself that he missed last appointment. States he has not checked his blood sugar in over a month due to lack of supplies, but does not feel it is high today. Will work on getting patient into endocrine again. Patient scheduled for next available appointment with Dr. Prieto. Gave patient physical copy of appointment details. Patient to go to lab after.

## 2022-10-13 NOTE — PROGRESS NOTES
Patient needs refill of metformin.     eGFR >90, random glucose consistently 200-500. A1c 8.6% on 7/6/22.     Renal function adequate to increase dose of metformin ER. Patient previously reported abdominal pain and was resistant to increasing dose of metformin but is agreeable to trying today. Will increase metformin ER to 500 mg 2 tabs daily (1000 mg daily).    Scheduled ID follow-up today. Will follow-up then.     Medication change via collaborative practice agreement with Dr. Prieto.    Dorie Holland, PharmD   Medication Therapy Management Pharmacist   October 13, 2022  [phone]

## 2022-10-14 LAB
HCV AB SERPL QL IA: NONREACTIVE
T PALLIDUM AB SER QL: NONREACTIVE

## 2022-10-16 RX ORDER — METFORMIN HCL 500 MG
TABLET, EXTENDED RELEASE 24 HR ORAL
Qty: 30 TABLET | Refills: 0 | OUTPATIENT
Start: 2022-10-16

## 2022-10-19 LAB — HIV1 RNA # PLAS NAA DL=20: NOT DETECTED COPIES/ML

## 2022-10-31 ENCOUNTER — TELEPHONE (OUTPATIENT)
Dept: INFECTIOUS DISEASES | Facility: CLINIC | Age: 57
End: 2022-10-31

## 2022-10-31 ENCOUNTER — TELEPHONE (OUTPATIENT)
Dept: ENDOCRINOLOGY | Facility: CLINIC | Age: 57
End: 2022-10-31

## 2022-10-31 NOTE — TELEPHONE ENCOUNTER
Spoke to Briseyda, care coordinator, and told her about patient's appointment and to ask patient to bring meter to appointment.  Nereyda Miranda

## 2022-11-17 ENCOUNTER — HOSPITAL ENCOUNTER (OUTPATIENT)
Facility: CLINIC | Age: 57
Setting detail: OBSERVATION
Discharge: HOME | End: 2022-11-18
Attending: EMERGENCY MEDICINE | Admitting: NURSE PRACTITIONER
Payer: COMMERCIAL

## 2022-11-17 ENCOUNTER — APPOINTMENT (OUTPATIENT)
Dept: ULTRASOUND IMAGING | Facility: CLINIC | Age: 57
End: 2022-11-17
Attending: NURSE PRACTITIONER
Payer: COMMERCIAL

## 2022-11-17 ENCOUNTER — APPOINTMENT (OUTPATIENT)
Dept: GENERAL RADIOLOGY | Facility: CLINIC | Age: 57
End: 2022-11-17
Attending: EMERGENCY MEDICINE
Payer: COMMERCIAL

## 2022-11-17 DIAGNOSIS — E11.65 TYPE 2 DIABETES MELLITUS WITH HYPERGLYCEMIA, WITH LONG-TERM CURRENT USE OF INSULIN (H): ICD-10-CM

## 2022-11-17 DIAGNOSIS — B20 HUMAN IMMUNODEFICIENCY VIRUS (HIV) DISEASE (H): ICD-10-CM

## 2022-11-17 DIAGNOSIS — R73.9 HYPERGLYCEMIA: ICD-10-CM

## 2022-11-17 DIAGNOSIS — L02.31 ABSCESS OF BUTTOCK: ICD-10-CM

## 2022-11-17 DIAGNOSIS — B20 HUMAN IMMUNODEFICIENCY VIRUS (HIV) DISEASE (H): Primary | ICD-10-CM

## 2022-11-17 DIAGNOSIS — Z79.4 TYPE 2 DIABETES MELLITUS WITH HYPERGLYCEMIA, WITH LONG-TERM CURRENT USE OF INSULIN (H): ICD-10-CM

## 2022-11-17 DIAGNOSIS — K21.00 GASTROESOPHAGEAL REFLUX DISEASE WITH ESOPHAGITIS, UNSPECIFIED WHETHER HEMORRHAGE: ICD-10-CM

## 2022-11-17 DIAGNOSIS — L03.032 PARONYCHIA OF TOE, LEFT: ICD-10-CM

## 2022-11-17 DIAGNOSIS — Z79.84 LONG TERM CURRENT USE OF ORAL HYPOGLYCEMIC DRUG: ICD-10-CM

## 2022-11-17 DIAGNOSIS — L03.032 ABSCESS OF FIFTH TOENAIL OF LEFT FOOT: ICD-10-CM

## 2022-11-17 DIAGNOSIS — K21.9 GASTROESOPHAGEAL REFLUX DISEASE WITHOUT ESOPHAGITIS: ICD-10-CM

## 2022-11-17 DIAGNOSIS — Z98.890 STATUS POST INCISION AND DRAINAGE: ICD-10-CM

## 2022-11-17 DIAGNOSIS — E11.65 INADEQUATELY CONTROLLED DIABETES MELLITUS (H): ICD-10-CM

## 2022-11-17 LAB
ALBUMIN SERPL BCG-MCNC: 4.2 G/DL (ref 3.5–5.2)
ALBUMIN UR-MCNC: NEGATIVE MG/DL
ALP SERPL-CCNC: 161 U/L (ref 40–129)
ALT SERPL W P-5'-P-CCNC: 9 U/L (ref 10–50)
ANION GAP SERPL CALCULATED.3IONS-SCNC: 11 MMOL/L (ref 7–15)
APPEARANCE UR: CLEAR
AST SERPL W P-5'-P-CCNC: 17 U/L (ref 10–50)
BASOPHILS # BLD AUTO: 0.1 10E3/UL (ref 0–0.2)
BASOPHILS NFR BLD AUTO: 1 %
BILIRUB SERPL-MCNC: 0.6 MG/DL
BILIRUB UR QL STRIP: NEGATIVE
BUN SERPL-MCNC: 16.1 MG/DL (ref 6–20)
CALCIUM SERPL-MCNC: 10.8 MG/DL (ref 8.6–10)
CHLORIDE SERPL-SCNC: 92 MMOL/L (ref 98–107)
COLOR UR AUTO: ABNORMAL
CREAT SERPL-MCNC: 0.7 MG/DL (ref 0.67–1.17)
DEPRECATED HCO3 PLAS-SCNC: 26 MMOL/L (ref 22–29)
EOSINOPHIL # BLD AUTO: 0.1 10E3/UL (ref 0–0.7)
EOSINOPHIL NFR BLD AUTO: 1 %
ERYTHROCYTE [DISTWIDTH] IN BLOOD BY AUTOMATED COUNT: 12.7 % (ref 10–15)
GFR SERPL CREATININE-BSD FRML MDRD: >90 ML/MIN/1.73M2
GLUCOSE BLDC GLUCOMTR-MCNC: 439 MG/DL (ref 70–99)
GLUCOSE BLDC GLUCOMTR-MCNC: 465 MG/DL (ref 70–99)
GLUCOSE BLDC GLUCOMTR-MCNC: 517 MG/DL (ref 70–99)
GLUCOSE BLDC GLUCOMTR-MCNC: 597 MG/DL (ref 70–99)
GLUCOSE SERPL-MCNC: 596 MG/DL (ref 70–99)
GLUCOSE UR STRIP-MCNC: >=1000 MG/DL
HBA1C MFR BLD: 12.8 %
HCT VFR BLD AUTO: 44.5 % (ref 40–53)
HGB BLD-MCNC: 15 G/DL (ref 13.3–17.7)
HGB UR QL STRIP: NEGATIVE
HOLD SPECIMEN: NORMAL
IMM GRANULOCYTES # BLD: 0.1 10E3/UL
IMM GRANULOCYTES NFR BLD: 1 %
KETONES BLD-SCNC: 0.1 MMOL/L (ref 0–0.6)
KETONES UR STRIP-MCNC: NEGATIVE MG/DL
LEUKOCYTE ESTERASE UR QL STRIP: NEGATIVE
LYMPHOCYTES # BLD AUTO: 2.3 10E3/UL (ref 0.8–5.3)
LYMPHOCYTES NFR BLD AUTO: 25 %
MCH RBC QN AUTO: 30.6 PG (ref 26.5–33)
MCHC RBC AUTO-ENTMCNC: 33.7 G/DL (ref 31.5–36.5)
MCV RBC AUTO: 91 FL (ref 78–100)
MONOCYTES # BLD AUTO: 0.7 10E3/UL (ref 0–1.3)
MONOCYTES NFR BLD AUTO: 7 %
MUCOUS THREADS #/AREA URNS LPF: PRESENT /LPF
NEUTROPHILS # BLD AUTO: 6 10E3/UL (ref 1.6–8.3)
NEUTROPHILS NFR BLD AUTO: 65 %
NITRATE UR QL: NEGATIVE
NRBC # BLD AUTO: 0 10E3/UL
NRBC BLD AUTO-RTO: 0 /100
PH UR STRIP: 5.5 [PH] (ref 5–7)
PLATELET # BLD AUTO: 344 10E3/UL (ref 150–450)
POTASSIUM SERPL-SCNC: 4.2 MMOL/L (ref 3.4–5.3)
PROT SERPL-MCNC: 7.8 G/DL (ref 6.4–8.3)
RBC # BLD AUTO: 4.9 10E6/UL (ref 4.4–5.9)
RBC URINE: 1 /HPF
SARS-COV-2 RNA RESP QL NAA+PROBE: NEGATIVE
SODIUM SERPL-SCNC: 129 MMOL/L (ref 136–145)
SP GR UR STRIP: 1.03 (ref 1–1.03)
UROBILINOGEN UR STRIP-MCNC: NORMAL MG/DL
WBC # BLD AUTO: 9.3 10E3/UL (ref 4–11)
WBC URINE: 0 /HPF

## 2022-11-17 PROCEDURE — 82962 GLUCOSE BLOOD TEST: CPT | Mod: 91

## 2022-11-17 PROCEDURE — 76882 US LMTD JT/FCL EVL NVASC XTR: CPT | Mod: 50

## 2022-11-17 PROCEDURE — 258N000003 HC RX IP 258 OP 636: Performed by: EMERGENCY MEDICINE

## 2022-11-17 PROCEDURE — G0378 HOSPITAL OBSERVATION PER HR: HCPCS

## 2022-11-17 PROCEDURE — 99285 EMERGENCY DEPT VISIT HI MDM: CPT | Mod: 25 | Performed by: EMERGENCY MEDICINE

## 2022-11-17 PROCEDURE — C9113 INJ PANTOPRAZOLE SODIUM, VIA: HCPCS | Performed by: NURSE PRACTITIONER

## 2022-11-17 PROCEDURE — 96366 THER/PROPH/DIAG IV INF ADDON: CPT | Mod: 59

## 2022-11-17 PROCEDURE — 96365 THER/PROPH/DIAG IV INF INIT: CPT

## 2022-11-17 PROCEDURE — 85025 COMPLETE CBC W/AUTO DIFF WBC: CPT | Performed by: EMERGENCY MEDICINE

## 2022-11-17 PROCEDURE — 73630 X-RAY EXAM OF FOOT: CPT | Mod: LT

## 2022-11-17 PROCEDURE — 96360 HYDRATION IV INFUSION INIT: CPT | Performed by: EMERGENCY MEDICINE

## 2022-11-17 PROCEDURE — 81001 URINALYSIS AUTO W/SCOPE: CPT | Performed by: NURSE PRACTITIONER

## 2022-11-17 PROCEDURE — 76882 US LMTD JT/FCL EVL NVASC XTR: CPT | Mod: 26 | Performed by: RADIOLOGY

## 2022-11-17 PROCEDURE — 36415 COLL VENOUS BLD VENIPUNCTURE: CPT | Performed by: EMERGENCY MEDICINE

## 2022-11-17 PROCEDURE — 96361 HYDRATE IV INFUSION ADD-ON: CPT | Performed by: EMERGENCY MEDICINE

## 2022-11-17 PROCEDURE — 80053 COMPREHEN METABOLIC PANEL: CPT | Performed by: EMERGENCY MEDICINE

## 2022-11-17 PROCEDURE — 73630 X-RAY EXAM OF FOOT: CPT | Mod: 26 | Performed by: RADIOLOGY

## 2022-11-17 PROCEDURE — 99218 PR INITIAL OBSERVATION CARE,LEVEL I: CPT | Performed by: NURSE PRACTITIONER

## 2022-11-17 PROCEDURE — 250N000009 HC RX 250: Performed by: EMERGENCY MEDICINE

## 2022-11-17 PROCEDURE — U0005 INFEC AGEN DETEC AMPLI PROBE: HCPCS | Performed by: EMERGENCY MEDICINE

## 2022-11-17 PROCEDURE — 250N000013 HC RX MED GY IP 250 OP 250 PS 637: Performed by: NURSE PRACTITIONER

## 2022-11-17 PROCEDURE — 82010 KETONE BODYS QUAN: CPT | Performed by: EMERGENCY MEDICINE

## 2022-11-17 PROCEDURE — 99285 EMERGENCY DEPT VISIT HI MDM: CPT | Mod: FS | Performed by: EMERGENCY MEDICINE

## 2022-11-17 PROCEDURE — 250N000011 HC RX IP 250 OP 636: Performed by: NURSE PRACTITIONER

## 2022-11-17 PROCEDURE — 83036 HEMOGLOBIN GLYCOSYLATED A1C: CPT | Performed by: EMERGENCY MEDICINE

## 2022-11-17 PROCEDURE — 96375 TX/PRO/DX INJ NEW DRUG ADDON: CPT

## 2022-11-17 RX ORDER — LIDOCAINE 40 MG/G
CREAM TOPICAL
Status: DISCONTINUED | OUTPATIENT
Start: 2022-11-17 | End: 2022-11-18 | Stop reason: HOSPADM

## 2022-11-17 RX ORDER — DEXTROSE MONOHYDRATE 25 G/50ML
25-50 INJECTION, SOLUTION INTRAVENOUS
Status: DISCONTINUED | OUTPATIENT
Start: 2022-11-17 | End: 2022-11-18 | Stop reason: HOSPADM

## 2022-11-17 RX ORDER — NICOTINE POLACRILEX 4 MG
15-30 LOZENGE BUCCAL
Status: DISCONTINUED | OUTPATIENT
Start: 2022-11-17 | End: 2022-11-18 | Stop reason: HOSPADM

## 2022-11-17 RX ORDER — SODIUM CHLORIDE 9 MG/ML
INJECTION, SOLUTION INTRAVENOUS CONTINUOUS
Status: DISCONTINUED | OUTPATIENT
Start: 2022-11-17 | End: 2022-11-18 | Stop reason: HOSPADM

## 2022-11-17 RX ORDER — NICOTINE POLACRILEX 4 MG
15-30 LOZENGE BUCCAL
Status: DISCONTINUED | OUTPATIENT
Start: 2022-11-17 | End: 2022-11-17

## 2022-11-17 RX ORDER — IBUPROFEN 600 MG/1
600 TABLET, FILM COATED ORAL EVERY 6 HOURS PRN
Status: DISCONTINUED | OUTPATIENT
Start: 2022-11-17 | End: 2022-11-18 | Stop reason: HOSPADM

## 2022-11-17 RX ORDER — ONDANSETRON 2 MG/ML
4 INJECTION INTRAMUSCULAR; INTRAVENOUS EVERY 6 HOURS PRN
Status: DISCONTINUED | OUTPATIENT
Start: 2022-11-17 | End: 2022-11-18 | Stop reason: HOSPADM

## 2022-11-17 RX ORDER — ONDANSETRON 4 MG/1
4 TABLET, ORALLY DISINTEGRATING ORAL EVERY 6 HOURS PRN
Status: DISCONTINUED | OUTPATIENT
Start: 2022-11-17 | End: 2022-11-18 | Stop reason: HOSPADM

## 2022-11-17 RX ORDER — DEXTROSE MONOHYDRATE 100 MG/ML
INJECTION, SOLUTION INTRAVENOUS CONTINUOUS PRN
Status: DISCONTINUED | OUTPATIENT
Start: 2022-11-17 | End: 2022-11-18 | Stop reason: HOSPADM

## 2022-11-17 RX ORDER — DEXTROSE MONOHYDRATE 25 G/50ML
25-50 INJECTION, SOLUTION INTRAVENOUS
Status: DISCONTINUED | OUTPATIENT
Start: 2022-11-17 | End: 2022-11-17

## 2022-11-17 RX ORDER — DOXYCYCLINE 100 MG/1
100 CAPSULE ORAL EVERY 12 HOURS SCHEDULED
Status: DISCONTINUED | OUTPATIENT
Start: 2022-11-18 | End: 2022-11-18 | Stop reason: HOSPADM

## 2022-11-17 RX ADMIN — SODIUM CHLORIDE 1000 ML: 9 INJECTION, SOLUTION INTRAVENOUS at 17:37

## 2022-11-17 RX ADMIN — SODIUM CHLORIDE: 9 INJECTION, SOLUTION INTRAVENOUS at 20:38

## 2022-11-17 RX ADMIN — PANTOPRAZOLE SODIUM 40 MG: 40 INJECTION, POWDER, FOR SOLUTION INTRAVENOUS at 22:53

## 2022-11-17 RX ADMIN — DOXYCYCLINE HYCLATE 100 MG: 100 CAPSULE ORAL at 23:50

## 2022-11-17 RX ADMIN — HUMAN INSULIN 5.5 UNITS/HR: 100 INJECTION, SOLUTION SUBCUTANEOUS at 20:30

## 2022-11-17 ASSESSMENT — ACTIVITIES OF DAILY LIVING (ADL)
ADLS_ACUITY_SCORE: 37

## 2022-11-17 ASSESSMENT — ENCOUNTER SYMPTOMS
SORE THROAT: 0
PALPITATIONS: 0
SHORTNESS OF BREATH: 0
CONFUSION: 0
BACK PAIN: 0
FLANK PAIN: 0
FEVER: 0
DIZZINESS: 0
NAUSEA: 0
POLYDIPSIA: 1
VOMITING: 0
COUGH: 0
CHILLS: 0
ABDOMINAL PAIN: 0
HEADACHES: 0

## 2022-11-17 NOTE — ED NOTES
"ED Triage Provider Note  [facility]  Encounter Date: Nov 17, 2022    History:  Chief Complaint   Patient presents with     Hyperglycemia     Toe Pain     Andrew Lockwood is a 54 year old male with a hx of HIV, GERD, DM2, DAVID, MDD, noncompliance with treatment, who presents to the ED with hyperglycemia.  Patient is a diabetic and is noncompliant with medications typically.  He is evidently supposed to be on insulin but reports he was taken off of it due to GI side effects.  He does report he takes metformin, but last took it 2 days ago.  He reports that he has not been able to follow-up with endocrinology and he was told to come to the emergency department to get his metformin.  Today the patient reports that his blood glucose was over 500.  He endorses polydipsia.  He denies fevers or chills, nasal congestion or sore throat.  No cough, shortness of breath, or chest pain.  No abdominal pain, nausea, vomiting, or diarrhea.  For the past few days he has noticed some pain and a wound along his left fourth toe.  He denies any history of diabetic neuropathy, but reports that he noticed a wound that was draining some purulent drainage yesterday.  It is painful for him.  No known trauma or injury.    Review of Systems:  No fevers or chills.  No nasal congestion or sore throat, no cough or shortness of breath, no chest pain.  No abdominal pain, nausea, vomiting, or diarrhea.  Positive for polydipsia and left fourth toe pain    Exam:  /88   Pulse 104   Temp 97.8  F (36.6  C) (Oral)   Resp 18   Ht 1.6 m (5' 3\")   Wt 62.6 kg (138 lb)   SpO2 98%   BMI 24.45 kg/m    General: No acute distress. Appears stated age.   Cardio: Regular rate, extremities well perfused  Resp: Normal work of breathing, grossly normal respiratory rate  Neuro: Alert. CN II-XII grossly intact. Grossly intact strength.   Ext: L fourth toe -dried blood noted along the toe.  Some discoloration noted " proximal to the nailbed.  Sensation appears intact, range of motion intact.         Medical Decision Making:  Patient arriving to the ED with problem as above. A medical screening exam was performed. Lab and XR orders initiated from Triage. The patient is appropriate to wait in triage.      Leticia Cruz MD on 11/17/2022 at 5:33 PM     Leticia Cruz MD  11/17/22 [id number]

## 2022-11-17 NOTE — ED TRIAGE NOTES
Presents with BG of 400, ran out of metformin 2 days ago.   Also left foot toe pain, digit 4.        Triage Assessment     Row Name 11/17/22 4807       Triage Assessment (Adult)    Airway WDL WDL       Respiratory WDL    Respiratory WDL WDL       Skin Circulation/Temperature WDL    Skin Circulation/Temperature WDL WDL       Cardiac WDL    Cardiac WDL WDL       Peripheral/Neurovascular WDL    Peripheral Neurovascular WDL WDL       Cognitive/Neuro/Behavioral WDL    Cognitive/Neuro/Behavioral WDL WDL

## 2022-11-17 NOTE — ED PROVIDER NOTES
ED Provider Note  [facility]      History     Chief Complaint   Patient presents with     Hyperglycemia     Toe Pain     HPI  Andrew Lockwood is a 54 year old male who has a PMHx of GERD, HIV, DM2 on metformin who presents to the emergency department with complaints of elevated blood sugars and pain to his foot.     He reports he has been out of his metformin for 2 days.  He has been prescribed insulin in the past but reports he has stopped taking it due to constipation and GI issues.  He reports he did take his blood sugar today and it was over 500.    He does endorse polydipsia but denies fevers, chills, sore throat, shortness of breath or chest pain, nausea or vomiting or abdominal pain. He reports he has a wound on his fourth toe, but does deny any history of diabetic neuropathy.  He also reports having a abscess to his right buttock. He reports he first sought this about 3 days ago.      Past Medical History  Past Medical History:   Diagnosis Date     AIDS (H)      Allergic rhinitis due to other allergen     DNS     Anal dysplasia      Chronic abdominal pain      CNS toxoplasmosis (H)      Diabetes type 2, controlled (H)      GERD (gastroesophageal reflux disease)      History of seizure      History of substance abuse (H)      HIV (human immunodeficiency virus infection) (H)      HLD (hyperlipidemia)      Lung nodules      Periungual wart      PTSD (post-traumatic stress disorder)      Sleep apnea     doesn't use CPAP     Past Surgical History:   Procedure Laterality Date     ANOSCOPY N/A 9/9/2020    Procedure: Exam Under Anesthesia, ANOSCOPY, fulgeration of rectal fissures with Rectal Biopsies;  Surgeon: Thanh Lundberg MD;  Location: [facility]     COLONOSCOPY Left 1/22/2016    Procedure: COMBINED COLONOSCOPY, SINGLE OR MULTIPLE BIOPSY/POLYPECTOMY BY BIOPSY;  Surgeon: Clark Saini MD;  [facility]     ESOPHAGOSCOPY, GASTROSCOPY, DUODENOSCOPY (EGD), COMBINED N/A 6/6/2022     "Procedure: ESOPHAGOGASTRODUODENOSCOPY, WITH BIOPSY;  Surgeon: Kecia Benítez MD;  Location: [facility]     HC EXPLORE UNDESC TESTIS,INGUIN/SCROTAL       LAPAROSCOPIC APPENDECTOMY N/A 1/31/2018    Procedure: LAPAROSCOPIC APPENDECTOMY;  LAPAROSCOPIC APPENDECTOMY;  Surgeon: Dawn Holt MD;  Location: [facility]     LAPAROSCOPY DIAGNOSTIC (GENERAL) N/A 7/26/2016    Procedure: LAPAROSCOPY DIAGNOSTIC (GENERAL);  Surgeon: Susannah Arriaga MD;  Location: [facility]     LAPAROSCOPY DIAGNOSTIC (GENERAL) N/A 4/16/2018    Procedure: LAPAROSCOPY DIAGNOSTIC (GENERAL);  Diagnostic laparoscopy and lysis of adhesions;  Surgeon: Prince Dowling MD;  Location: [facility]     OPTICAL TRACKING SYSTEM CRANIOTOMY, EXCISE TUMOR, COMBINED Left 4/10/2015    Procedure: COMBINED OPTICAL TRACKING SYSTEM CRANIOTOMY, EXCISE TUMOR;  Surgeon: Mirlande Colmenares MD;  Location: [facility]     REPAIR GAMEKEEPER'S THUMB Right 12/2/2016    Procedure: REPAIR LIGAMENT ULNAR COLLATERAL THUMB (GAMEKEEPER'S);  Surgeon: Evin Zamorano MD;  Location: [facility]     ZZC NONSPECIFIC PROCEDURE      right forearm fracture     alcohol swab prep pads  bictegravir-emtricitabine-tenofovir (BIKTARVY) -25 MG per tablet  blood glucose (NO BRAND SPECIFIED) test strip  blood glucose monitoring (NO BRAND SPECIFIED) meter device kit  metFORMIN (GLUCOPHAGE XR) 500 MG 24 hr tablet  pantoprazole (PROTONIX) 20 MG EC tablet  STATIN NOT PRESCRIBED (INTENTIONAL)  thin (NO BRAND SPECIFIED) lancets      Allergies   Allergen Reactions     Metformin      Abdominal pain     Tylenol [Acetaminophen] Itching     Dulaglutide Rash     Insulin Lispro Rash     Patient reported     Penicillin V Other (See Comments) and Rash     Diffuse maculopapular rash + feels \"high\", per pt.      Family History  Family History   Problem Relation Age of Onset     Diabetes Brother      Diabetes Father      Alzheimer Disease Father      Unknown/Adopted Mother      Diabetes Paternal Grandfather      " "Cancer No family hx of         no skin cancer     Skin Cancer No family hx of         no famiy hx of skin cancer     Glaucoma No family hx of      Macular Degeneration No family hx of      Social History   Social History     Tobacco Use     Smoking status: Some Days     Packs/day: 0.25     Years: 40.00     Pack years: 10.00     Types: Cigarettes     Smokeless tobacco: Former   Substance Use Topics     Alcohol use: No     Alcohol/week: 0.0 standard drinks     Comment: Last etoh in 2007     Drug use: Not Currently     Types: Marijuana, Methamphetamines      Past medical history, past surgical history, medications, allergies, family history, and social history were reviewed with the patient. No additional pertinent items.       Review of Systems   Constitutional: Negative for chills and fever.   HENT: Negative for sore throat.    Respiratory: Negative for cough and shortness of breath.    Cardiovascular: Negative for chest pain and palpitations.   Gastrointestinal: Negative for abdominal pain, nausea and vomiting.   Endocrine: Positive for polydipsia.   Genitourinary: Negative for flank pain.   Musculoskeletal: Negative for back pain.   Skin:        Abscess to right buttocks.   Neurological: Negative for dizziness and headaches.   Psychiatric/Behavioral: Negative for confusion.   All other systems reviewed and are negative.    A complete review of systems was performed with pertinent positives and negatives noted in the HPI, and all other systems negative.    Physical Exam   BP: 136/88  Pulse: 104  Temp: 97.8  F (36.6  C)  Resp: 18  Height: 160 cm (5' 3\")  Weight: 62.6 kg (138 lb)  SpO2: 98 %  Physical Exam  Vitals and nursing note reviewed.   HENT:      Head: Normocephalic.      Right Ear: External ear normal.      Left Ear: External ear normal.      Nose: Nose normal.      Mouth/Throat:      Mouth: Mucous membranes are moist.   Eyes:      Conjunctiva/sclera: Conjunctivae normal.   Cardiovascular:      Rate and " Rhythm: Normal rate and regular rhythm.      Pulses: Normal pulses.      Heart sounds: Normal heart sounds.   Pulmonary:      Effort: Pulmonary effort is normal.      Breath sounds: Normal breath sounds.   Abdominal:      General: Abdomen is flat. Bowel sounds are normal.      Palpations: Abdomen is soft.      Tenderness: There is no abdominal tenderness. There is no right CVA tenderness or left CVA tenderness.   Musculoskeletal:         General: Normal range of motion.      Cervical back: Normal range of motion and neck supple.   Skin:     General: Skin is warm and dry.          Neurological:      Mental Status: He is alert.   Psychiatric:         Mood and Affect: Mood normal.             ED Course      Procedures          Results for orders placed or performed during the hospital encounter of 11/17/22   Foot XR, G/E 3 views, left     Status: None    Narrative    EXAM: XR FOOT LEFT G/E 3 VIEWS  LOCATION: [facility]  DATE/TIME: 11/17/2022 5:59 PM    INDICATION: diabetic, wound on fourth toe, eval for any bony changes concerning for infection  COMPARISON: None.      Impression    IMPRESSION: The left foot is negative for fracture. No radiographic evidence for osteomyelitis.   [place] Draw     Status: None    Narrative    The following orders were created for panel order [place] Draw.  Procedure                               Abnormality         Status                     ---------                               -----------         ------                     Extra Blue Top Tube[[id number]]                              Final result               Extra Red Top Tube[[id number]]                               Final result               Extra Green Top (Lithium...[[id number]]                      Final result               Extra Purple Top Tube[[id number]]                            Final result                 Please view results for these tests on the individual orders.   Extra Blue  Top Tube     Status: None   Result Value Ref Range    Hold Specimen JIC    Extra Red Top Tube     Status: None   Result Value Ref Range    Hold Specimen JIC    Extra Green Top (Lithium Heparin) Tube     Status: None   Result Value Ref Range    Hold Specimen JIC    Extra Purple Top Tube     Status: None   Result Value Ref Range    Hold Specimen JIC    Ketone Beta-Hydroxybutyrate Quantitative,Rapid     Status: Normal   Result Value Ref Range    Ketone (Beta-Hydroxybutyrate) Quantitative 0.1 0.0 - 0.6 mmol/L   Comprehensive metabolic panel     Status: Abnormal   Result Value Ref Range    Sodium 129 (L) 136 - 145 mmol/L    Potassium 4.2 3.4 - 5.3 mmol/L    Chloride 92 (L) 98 - 107 mmol/L    Carbon Dioxide (CO2) 26 22 - 29 mmol/L    Anion Gap 11 7 - 15 mmol/L    Urea Nitrogen 16.1 6.0 - 20.0 mg/dL    Creatinine 0.70 0.67 - 1.17 mg/dL    Calcium 10.8 (H) 8.6 - 10.0 mg/dL    Glucose 596 (HH) 70 - 99 mg/dL    Alkaline Phosphatase 161 (H) 40 - 129 U/L    AST 17 10 - 50 U/L    ALT 9 (L) 10 - 50 U/L    Protein Total 7.8 6.4 - 8.3 g/dL    Albumin 4.2 3.5 - 5.2 g/dL    Bilirubin Total 0.6 <=1.2 mg/dL    GFR Estimate >90 >60 mL/min/1.73m2   Hemoglobin A1c     Status: Abnormal   Result Value Ref Range    Hemoglobin A1C 12.8 (H) <5.7 %   Glucose by meter     Status: Abnormal   Result Value Ref Range    GLUCOSE BY METER POCT 597 (HH) 70 - 99 mg/dL   CBC with platelets and differential     Status: None   Result Value Ref Range    WBC Count 9.3 4.0 - 11.0 10e3/uL    RBC Count 4.90 4.40 - 5.90 10e6/uL    Hemoglobin 15.0 13.3 - 17.7 g/dL    Hematocrit 44.5 40.0 - 53.0 %    MCV 91 78 - 100 fL    MCH 30.6 26.5 - 33.0 pg    MCHC 33.7 31.5 - 36.5 g/dL    RDW 12.7 10.0 - 15.0 %    Platelet Count 344 150 - 450 10e3/uL    % Neutrophils 65 %    % Lymphocytes 25 %    % Monocytes 7 %    % Eosinophils 1 %    % Basophils 1 %    % Immature Granulocytes 1 %    NRBCs per 100 WBC 0 <1 /100    Absolute Neutrophils 6.0 1.6 - 8.3 10e3/uL    Absolute  Lymphocytes 2.3 0.8 - 5.3 10e3/uL    Absolute Monocytes 0.7 0.0 - 1.3 10e3/uL    Absolute Eosinophils 0.1 0.0 - 0.7 10e3/uL    Absolute Basophils 0.1 0.0 - 0.2 10e3/uL    Absolute Immature Granulocytes 0.1 <=0.4 10e3/uL    Absolute NRBCs 0.0 10e3/uL   Glucose by meter     Status: Abnormal   Result Value Ref Range    GLUCOSE BY METER POCT 439 (H) 70 - 99 mg/dL   UA with Microscopic reflex to Culture     Status: Abnormal    Specimen: Urine, Midstream   Result Value Ref Range    Color Urine Straw Colorless, Straw, Light Yellow, Yellow    Appearance Urine Clear Clear    Glucose Urine >=1000 (A) Negative mg/dL    Bilirubin Urine Negative Negative    Ketones Urine Negative Negative mg/dL    Specific Gravity Urine 1.032 1.003 - 1.035    Blood Urine Negative Negative    pH Urine 5.5 5.0 - 7.0    Protein Albumin Urine Negative Negative mg/dL    Urobilinogen Urine Normal Normal, 2.0 mg/dL    Nitrite Urine Negative Negative    Leukocyte Esterase Urine Negative Negative    Mucus Urine Present (A) None Seen /LPF    RBC Urine 1 <=2 /HPF    WBC Urine 0 <=5 /HPF    Narrative    Urine Culture not indicated   Glucose by meter     Status: Abnormal   Result Value Ref Range    GLUCOSE BY METER POCT 517 (HH) 70 - 99 mg/dL   Glucose by meter     Status: Abnormal   Result Value Ref Range    GLUCOSE BY METER POCT 465 (H) 70 - 99 mg/dL   CBC with Platelets & Differential     Status: None    Narrative    The following orders were created for panel order CBC with Platelets & Differential.  Procedure                               Abnormality         Status                     ---------                               -----------         ------                     CBC with platelets and d...[[id number]]                      Final result                 Please view results for these tests on the individual orders.     Medications   dextrose 10% infusion (has no administration in time range)   sodium chloride 0.9% infusion ( Intravenous New Bag  11/17/22 2038)   insulin 1 unit/mL in saline (NovoLIN, HumuLIN Regular) drip - ADULT IV Infusion (5.5 Units/hr Intravenous New Bag 11/17/22 2030)   glucose gel 15-30 g (has no administration in time range)     Or   dextrose 50 % injection 25-50 mL (has no administration in time range)     Or   glucagon injection 1 mg (has no administration in time range)   pantoprazole (PROTONIX) IV push injection 40 mg (has no administration in time range)   0.9% sodium chloride BOLUS (0 mLs Intravenous Stopped 11/17/22 1940)        Assessments & Plan (with Medical Decision Making)   54 year old male w/ PMH notable for HIV and diabetes with medication non-compliance.     Clinically, patient appears in no acute distress. Vital signs stable without tachycardia or hypotension. Otherwise on examination patient with small wound to left fourth toe with small amount of dried blood noted along the toe.His x-ray was negative for fracture of signs of osteomyelitis. Patient's abscess to buttocks at this time does not appear to require drainage and patient reports in the past he has not required antibiotics.     Patient's work up began in triage.  CBC was unremarkable, his initial CMP showed a sodium of 129, but corrected for his hyperglycemia approximately 137. His blood sugar was greater than 500.  His serum ketones was only 0.1.  He was given 1 L of fluids while waiting in the waiting room.  The recheck after fluids was 430.  Patient was able to be roomed, and at this time the insulin infusion was started.    With his elevated blood sugar at this time I feel patient should be admitted for an insulin drip to help control his blood sugar.  I also discussed with him the possibility of restarting his outpatient insulin.  At this time he reports he only would like to take pills.  He may benefit from diabetic educator consult. In our discussion I spoke with patient about coming into the ED observation unit for insulin drip over night. He reports  he is open to this.    I did speak to the ED observation KASSY. I reviewed patient's presentation, current state of workup/any pending studies.     He will be brought into ED Observation.    I have reviewed the nursing notes. I have reviewed the findings, diagnosis, plan and need for follow up with the patient.    New Prescriptions    No medications on file       Final diagnoses:   Hyperglycemia       --  VIKASH Moraes CNP  [facility] EMERGENCY DEPARTMENT  11/17/2022     Sachin Cruz APRN CNP  11/17/22 2203    --------------------------------------------------------------------------------------------------------------------------------    ED Attending Physician Attestation    I Leticia Cruz MD, cared for this patient with the Advanced Practice Provider (KASSY). I have performed a history and physical examination of the patient independent of the KASSY. I reviewed the KASSY's documentation above and agree with the documented findings and plan of care. I personally provided a substantive portion of the care for this patient.    I personally performed the substantive portion of the medical decision making for this visit - please see the KASSY's documentation for full details.    Key management decisions made by me and carried out under my direction: IVF, insulin drip    I personally performed the substantive portion of the history for this visit - please see the KASSY's documentation for full details.  Key additional history findings made by me: c/o L fourth toe pain x 2 days with bleeding from wound today    I personally performed the substantive portion of the physical exam - please see the KASSY's documentation for full details.  I concur with the KASSY exam findings, in addition I have noted: L fourth toe - slight discoloration proximal to the nail with dried blood on toe    Summary of HPI, PE, ED Course   Patient is a 54 year old male evaluated in the emergency department for hyperglycemia, patient  typically is noncompliant with medications, including insulin. Exam notable for normal mentation, normal vital signs. ED course notable for persistently elevated glucose despite IV fluids, so IV insulin was started.    Patient also has what appears to be an indurated region on the right buttock, no obvious open wound or drainage at this time, slightly erythematous on inspection. After the completion of care in the emergency department, the patient was admitted to observation.    Critical Care & Procedures  Not applicable.    Medical Decision Making  The medical record was reviewed and interpreted.  Current labs reviewed and interpreted.      Leticia Cruz MD  Emergency Medicine            Leticia Cruz MD  11/17/22 1398

## 2022-11-18 VITALS
WEIGHT: 148.01 LBS | BODY MASS INDEX: 26.23 KG/M2 | HEART RATE: 85 BPM | SYSTOLIC BLOOD PRESSURE: 114 MMHG | TEMPERATURE: 98.2 F | HEIGHT: 63 IN | DIASTOLIC BLOOD PRESSURE: 71 MMHG | RESPIRATION RATE: 16 BRPM | OXYGEN SATURATION: 100 %

## 2022-11-18 LAB
ANION GAP SERPL CALCULATED.3IONS-SCNC: 11 MMOL/L (ref 7–15)
BUN SERPL-MCNC: 16 MG/DL (ref 6–20)
CALCIUM SERPL-MCNC: 9.4 MG/DL (ref 8.6–10)
CHLORIDE SERPL-SCNC: 103 MMOL/L (ref 98–107)
CREAT SERPL-MCNC: 0.56 MG/DL (ref 0.67–1.17)
DEPRECATED HCO3 PLAS-SCNC: 23 MMOL/L (ref 22–29)
ERYTHROCYTE [DISTWIDTH] IN BLOOD BY AUTOMATED COUNT: 12.6 % (ref 10–15)
GFR SERPL CREATININE-BSD FRML MDRD: >90 ML/MIN/1.73M2
GLUCOSE BLDC GLUCOMTR-MCNC: 119 MG/DL (ref 70–99)
GLUCOSE BLDC GLUCOMTR-MCNC: 128 MG/DL (ref 70–99)
GLUCOSE BLDC GLUCOMTR-MCNC: 155 MG/DL (ref 70–99)
GLUCOSE BLDC GLUCOMTR-MCNC: 173 MG/DL (ref 70–99)
GLUCOSE BLDC GLUCOMTR-MCNC: 211 MG/DL (ref 70–99)
GLUCOSE BLDC GLUCOMTR-MCNC: 232 MG/DL (ref 70–99)
GLUCOSE BLDC GLUCOMTR-MCNC: 253 MG/DL (ref 70–99)
GLUCOSE BLDC GLUCOMTR-MCNC: 316 MG/DL (ref 70–99)
GLUCOSE BLDC GLUCOMTR-MCNC: 316 MG/DL (ref 70–99)
GLUCOSE BLDC GLUCOMTR-MCNC: 363 MG/DL (ref 70–99)
GLUCOSE BLDC GLUCOMTR-MCNC: 376 MG/DL (ref 70–99)
GLUCOSE BLDC GLUCOMTR-MCNC: 97 MG/DL (ref 70–99)
GLUCOSE SERPL-MCNC: 116 MG/DL (ref 70–99)
HCT VFR BLD AUTO: 37.7 % (ref 40–53)
HGB BLD-MCNC: 12.9 G/DL (ref 13.3–17.7)
MCH RBC QN AUTO: 30.7 PG (ref 26.5–33)
MCHC RBC AUTO-ENTMCNC: 34.2 G/DL (ref 31.5–36.5)
MCV RBC AUTO: 90 FL (ref 78–100)
PLATELET # BLD AUTO: 277 10E3/UL (ref 150–450)
POTASSIUM SERPL-SCNC: 3.2 MMOL/L (ref 3.4–5.3)
RBC # BLD AUTO: 4.2 10E6/UL (ref 4.4–5.9)
SODIUM SERPL-SCNC: 137 MMOL/L (ref 136–145)
WBC # BLD AUTO: 8.6 10E3/UL (ref 4–11)

## 2022-11-18 PROCEDURE — 96372 THER/PROPH/DIAG INJ SC/IM: CPT | Performed by: NURSE PRACTITIONER

## 2022-11-18 PROCEDURE — 250N000009 HC RX 250: Performed by: EMERGENCY MEDICINE

## 2022-11-18 PROCEDURE — 10060 I&D ABSCESS SIMPLE/SINGLE: CPT | Performed by: SURGERY

## 2022-11-18 PROCEDURE — 96372 THER/PROPH/DIAG INJ SC/IM: CPT | Performed by: PHYSICIAN ASSISTANT

## 2022-11-18 PROCEDURE — 10061 I&D ABSCESS COMP/MULTIPLE: CPT

## 2022-11-18 PROCEDURE — 80048 BASIC METABOLIC PNL TOTAL CA: CPT | Performed by: NURSE PRACTITIONER

## 2022-11-18 PROCEDURE — C9113 INJ PANTOPRAZOLE SODIUM, VIA: HCPCS | Performed by: NURSE PRACTITIONER

## 2022-11-18 PROCEDURE — 250N000011 HC RX IP 250 OP 636: Performed by: NURSE PRACTITIONER

## 2022-11-18 PROCEDURE — 250N000013 HC RX MED GY IP 250 OP 250 PS 637: Performed by: NURSE PRACTITIONER

## 2022-11-18 PROCEDURE — 10060 I&D ABSCESS SIMPLE/SINGLE: CPT

## 2022-11-18 PROCEDURE — 82962 GLUCOSE BLOOD TEST: CPT

## 2022-11-18 PROCEDURE — 36415 COLL VENOUS BLD VENIPUNCTURE: CPT | Performed by: NURSE PRACTITIONER

## 2022-11-18 PROCEDURE — G0378 HOSPITAL OBSERVATION PER HR: HCPCS

## 2022-11-18 PROCEDURE — 99213 OFFICE O/P EST LOW 20 MIN: CPT | Mod: 25 | Performed by: SURGERY

## 2022-11-18 PROCEDURE — 250N000012 HC RX MED GY IP 250 OP 636 PS 637: Performed by: PHYSICIAN ASSISTANT

## 2022-11-18 PROCEDURE — 99214 OFFICE O/P EST MOD 30 MIN: CPT | Performed by: NURSE PRACTITIONER

## 2022-11-18 PROCEDURE — 96366 THER/PROPH/DIAG IV INF ADDON: CPT | Mod: 59

## 2022-11-18 PROCEDURE — 250N000012 HC RX MED GY IP 250 OP 636 PS 637: Performed by: NURSE PRACTITIONER

## 2022-11-18 PROCEDURE — 85027 COMPLETE CBC AUTOMATED: CPT | Performed by: NURSE PRACTITIONER

## 2022-11-18 PROCEDURE — 96376 TX/PRO/DX INJ SAME DRUG ADON: CPT | Mod: 59

## 2022-11-18 PROCEDURE — 99217 PR OBSERVATION CARE DISCHARGE: CPT | Mod: FS | Performed by: EMERGENCY MEDICINE

## 2022-11-18 PROCEDURE — 250N000013 HC RX MED GY IP 250 OP 250 PS 637: Performed by: PHYSICIAN ASSISTANT

## 2022-11-18 RX ORDER — BICTEGRAVIR SODIUM, EMTRICITABINE, AND TENOFOVIR ALAFENAMIDE FUMARATE 50; 200; 25 MG/1; MG/1; MG/1
1 TABLET ORAL DAILY
Qty: 30 TABLET | Refills: 0 | Status: SHIPPED | OUTPATIENT
Start: 2022-11-18 | End: 2023-01-25

## 2022-11-18 RX ORDER — POTASSIUM CHLORIDE 750 MG/1
20 TABLET, EXTENDED RELEASE ORAL ONCE
Status: COMPLETED | OUTPATIENT
Start: 2022-11-18 | End: 2022-11-18

## 2022-11-18 RX ORDER — PANTOPRAZOLE SODIUM 20 MG/1
20 TABLET, DELAYED RELEASE ORAL 2 TIMES DAILY
Qty: 60 TABLET | Refills: 0 | Status: SHIPPED | OUTPATIENT
Start: 2022-11-18 | End: 2022-12-15

## 2022-11-18 RX ORDER — METFORMIN HCL 500 MG
1500 TABLET, EXTENDED RELEASE 24 HR ORAL
Qty: 90 TABLET | Refills: 0 | Status: SHIPPED | OUTPATIENT
Start: 2022-11-18 | End: 2023-01-25

## 2022-11-18 RX ORDER — IBUPROFEN 200 MG
2 CAPSULE ORAL DAILY
Qty: 30 EACH | Refills: 0 | Status: SHIPPED | OUTPATIENT
Start: 2022-11-18 | End: 2023-01-25

## 2022-11-18 RX ORDER — PANTOPRAZOLE SODIUM 40 MG/1
40 TABLET, DELAYED RELEASE ORAL 2 TIMES DAILY
Status: DISCONTINUED | OUTPATIENT
Start: 2022-11-18 | End: 2022-11-18 | Stop reason: HOSPADM

## 2022-11-18 RX ORDER — METFORMIN HCL 500 MG
1500 TABLET, EXTENDED RELEASE 24 HR ORAL
Status: DISCONTINUED | OUTPATIENT
Start: 2022-11-18 | End: 2022-11-18 | Stop reason: HOSPADM

## 2022-11-18 RX ORDER — MUPIROCIN 20 MG/G
OINTMENT TOPICAL 3 TIMES DAILY
Qty: 15 G | Refills: 0 | Status: SHIPPED | OUTPATIENT
Start: 2022-11-18 | End: 2023-01-25

## 2022-11-18 RX ORDER — BLOOD PRESSURE TEST KIT
1 KIT MISCELLANEOUS DAILY
Qty: 100 EACH | Refills: 0 | Status: SHIPPED | OUTPATIENT
Start: 2022-11-18 | End: 2023-01-25

## 2022-11-18 RX ADMIN — HUMAN INSULIN 7 UNITS/HR: 100 INJECTION, SOLUTION SUBCUTANEOUS at 03:20

## 2022-11-18 RX ADMIN — IBUPROFEN 600 MG: 600 TABLET, FILM COATED ORAL at 01:21

## 2022-11-18 RX ADMIN — HUMAN INSULIN 3 UNITS/HR: 100 INJECTION, SOLUTION SUBCUTANEOUS at 07:44

## 2022-11-18 RX ADMIN — INSULIN ASPART 4 UNITS: 100 INJECTION, SOLUTION INTRAVENOUS; SUBCUTANEOUS at 13:28

## 2022-11-18 RX ADMIN — POTASSIUM CHLORIDE 20 MEQ: 750 TABLET, EXTENDED RELEASE ORAL at 12:47

## 2022-11-18 RX ADMIN — HUMAN INSULIN 1.5 UNITS/HR: 100 INJECTION, SOLUTION SUBCUTANEOUS at 06:29

## 2022-11-18 RX ADMIN — HUMAN INSULIN 4 UNITS/HR: 100 INJECTION, SOLUTION SUBCUTANEOUS at 05:28

## 2022-11-18 RX ADMIN — HUMAN INSULIN 7 UNITS/HR: 100 INJECTION, SOLUTION SUBCUTANEOUS at 04:26

## 2022-11-18 RX ADMIN — INSULIN DETEMIR 17 UNITS: 100 INJECTION, SOLUTION SUBCUTANEOUS at 12:47

## 2022-11-18 RX ADMIN — BICTEGRAVIR SODIUM, EMTRICITABINE, AND TENOFOVIR ALAFENAMIDE FUMARATE 1 TABLET: 50; 200; 25 TABLET ORAL at 08:36

## 2022-11-18 RX ADMIN — PANTOPRAZOLE SODIUM 40 MG: 40 INJECTION, POWDER, FOR SOLUTION INTRAVENOUS at 08:37

## 2022-11-18 RX ADMIN — DOXYCYCLINE HYCLATE 100 MG: 100 CAPSULE ORAL at 08:37

## 2022-11-18 ASSESSMENT — ACTIVITIES OF DAILY LIVING (ADL)
ADLS_ACUITY_SCORE: 33

## 2022-11-18 NOTE — PROGRESS NOTES
5/17/21 last visit, no protocol ok to refill?   Pt admitted to Obs unit from [facility] via liter ;KASSY notified.

## 2022-11-18 NOTE — CONSULTS
NEW INPATIENT DIABETES MANAGEMENT CONSULT  Andrew Lockwood  Age: 54 year old  MRN # [id number]   YOB: 1967    Chief Complaint: hyperglycemia  Reason for Consult: Recs for home regimen  Consulting Provider: Ceci Bran APRN CNP     History of Present Illness: Andrew Lockwood is a 54 year old male admitted on 11/17/2022. He has a history of HIV, GERD, DM2, DAVID, MDD, presents to the ED with hyperglycemia.       On arrival labs showed: GFR>90. Creatinine 0.70.  Sodium 129.  Alk phos 161.  . A1C 12.8. serum ketones 0.1.  WBC 8.6, hgb 12.9. He was started on non DKA insulin gtt and BG normalizing, see BG trend:      He is known to IDS from previous hospitalizations and follows /[facility] in the clinic, but has not seen since 10/19/21.  He was a no show for his endo appt on 11/1/22.  In the past he has not tolerated glargine due to abdominal pain, but did tolerate detemir.  Endocrine placed curbside recs from note on 2/20/22.  See below:     Rec while NPO or decreased PO:  Detemir 10 unit(s)  BID   When PO improves -> increase detemir to at least 20 unit(s) BID  If he has access to novolog - has used fixed dosing in past - 5 unit(s) with each meal is a safe/conservative dose for a full meal -(has used up to 10 unit(s) per meal per chart review in past)     sliding scale insulin (again if he has access to novolog)      For Pre-Meal  - 164 give 1 unit.   For Pre-Meal  - 189 give 2 units.   For Pre-Meal  - 214 give 3 units.   For Pre-Meal  - 239 give 4 units.   For Pre-Meal  - 264 give 5 units.   For Pre-Meal  - 289 give 6 units.   For Pre-Meal  - 314 give 7 units.   For Pre-Meal  - 339 give 8 units.   For Pre-Meal BG = or > 340 give 9 units.    BEDTIME-  For  - 224 give 1 units.   For  - 249 give 2 units.   For  - 274 give 3 units.   For  - 299 give 4 units.   For  - 324 give 5 units.   For  - 349 give 6  units.   For BG = or > 350 give 7 units.    In talking with the patient he does not want to take any insulin as it has caused SBO in the past.  Through chart review I do see a history of SBO, but not correlated with insulin.  In June 2022 he was admitted with concern for SBO, BG at that time was:    He was admitted end of May 2022 and was on Metformin 500 mg XR and Jardiance 25 mg daily (don't see that it was given).  BG reasonable, but he was likely not eating much.  See trend:       Patient tells me he ran out of his Metformin 2 days ago and BG yesterday was 500-600s.  He felt lethargic with polyuria, polydipsia.  Patient notes a 20lb weight loss over the past 2 weeks.  He thinks it is the Metformin causing this.  I discussed that likely his uncontrolled diabetes is the culprit and he needs at least once daily insulin.  Discussed the risks of uncontrolled diabetes with the patient and then he was agreeable to try the levemir.  He tells me he most recently was taking Metformin XR 1500 mg (that was prescribed from the ED, per his report).  He is tolerating this well.  He tells me he stopped the Jardiance because he is allergic.  He currently has health insurance, but does not have a cell phone to make a clinic appt.  Living in homeless shelter that provides 3 meals per day.  He tells me he is able to obtain food at this point.      Wt Readings from Last 10 Encounters:   11/18/22 67.1 kg (148 lb 0.2 oz)   09/08/22 68 kg (150 lb)   06/24/22 70.3 kg (155 lb)   06/05/22 70.3 kg (155 lb)   05/28/22 73.4 kg (161 lb 12.8 oz)   05/20/22 72.6 kg (160 lb)   04/30/22 70.3 kg (155 lb)   03/23/22 70.5 kg (155 lb 8 oz)   03/16/22 71.4 kg (157 lb 8 oz)   02/18/22 70.3 kg (155 lb)     Insulin gtt currently off with novolog medium resistance sliding scale insulin ordered by primary team.  Patient ate Bfast.  Plan is to discharge today.     Other Active Medical Problems: toe pain, right buttocks abscess  Diabetes Mellitus Type:  "2  Duration: 8 year duration  Diabetic Complications: no recent eye exam, denies peripheral neuropathy, denies nephropathy  Prior to Admission Diabetes Regimen:    Metformin XR 1500 mg daily  Not walking as much due to the weather  (has tried a variety of oral and non-insulin injectables, but did not tolerate them)  BG monitor: glucometer  Frequency of checks: once a week  BG maybe 200-300s?  He cannot tell me.  Diet: eats three meals per day provided by the homeless shelter, denies food insecurity at this point  Usual BG control PTA:    Lab Results   Component Value Date    A1C 12.8 11/17/2022    A1C 11.2 02/18/2022    A1C 11.5 01/22/2022    A1C 11.9 12/24/2021    A1C 10.6 10/21/2021    A1C 9.7 11/11/2020    A1C 8.2 09/25/2020    A1C 8.2 08/27/2020    A1C 11.6 02/11/2020    A1C 12.3 01/07/2020       History of DKA: denies  Able to Detect Hypoglycemia: yes  Usual Diabetes Care Provider: [facility]  Primary Care Provider: Ayala Prieto  Factors Impacting Glucose Control: lack of stable housing is main factor  Current Diet: 60g CHO diet  10 point ROS completed with pertinent positives and negatives noted in the HPI  Past medical, family and social histories are reviewed and updated.    Social History    Tobacco: current everyday smoker    Alcohol: denies    Marital Status: single    Place of Residence: homeless shelter    Occupation: unemployed    Family History  Family History   Problem Relation Age of Onset     Diabetes Brother      Diabetes Father      Alzheimer Disease Father      Unknown/Adopted Mother      Diabetes Paternal Grandfather      Cancer No family hx of         no skin cancer     Skin Cancer No family hx of         no famiy hx of skin cancer     Glaucoma No family hx of      Macular Degeneration No family hx of           Physical Exam   /81 (BP Location: Right arm)   Pulse 77   Temp 98.4  F (36.9  C) (Oral)   Resp 16   Ht 1.6 m (5' 3\")   Wt 67.1 kg (148 lb 0.2 oz) " "  SpO2 100%   BMI 26.22 kg/m    General: pleasant, in no distress.    HEENT: normocephalic, atraumatic. Oral mucous membranes moist.   Lungs: unlabored respiration, no cough  ABD: rounded, nondistended  Skin: warm and dry, no obvious lesions  MSK:  moves all extremities  Mental status:  alert, oriented to self, place, time  Psych:   calm and appropriate interaction     Most Recent Laboratory Tests:  Recent Labs   Lab 11/18/22  0725   HGB 12.9*     Recent Labs   Lab 11/17/22  1730   A1C 12.8*     Recent Labs   Lab 11/17/22  1730   CR 0.70     Recent Labs   Lab 11/18/22  0839 11/18/22  0747 11/18/22  0628 11/18/22  0529 11/18/22  0428 11/18/22  0321   GLC 97 128* 119* 173* 211* 253*       Patient needs to resume at least once daily long acting insulin given his uncontrolled DM 2 (A1C 12.8) and reported weight loss with also a buttocks abscess.  He is agreeable to this.  Will continue with Metformin as well.      Assessment:  1) Uncontrolled DM type 2 (A1C 12.8) presenting with hyperglycemia  2) weight loss      Plan:    -start Levemir 17 units once daily, give ASAP   -resume Metformin XR 1500 mg daily   -Novolog medium resistance sliding scale AC, HS   -BG monitoring TID AC, HS, 0200   -hypoglycemia protocol   -recommend diet per primary team with carb counting protocol   -no diabetes education needs at this time   -on discharge, will recommend outpatient follow up with [facility] service, recommend CCRN consult to help arrange this appt prior to discharge as patient does not have a cellphone   --upon discharge, patient will need the following supplies: Levemir insulin pens, \"BD\" (32G x 4mm) insulin pen needles, and Metformin prescription     Recommendations for Discharge:   Instructions to patient were posted in AVS and discussed on day of discharge.   Medications and supplies are to be ordered by primary service on discharge.   *please use the DIAB non-branded discharge supply order set " "([id number])*     -blood glucose monitoring twice daily (once in morning and once before a meal) and record, bring to clinic appt  -Levemir insulin 17 units daily in the morning   -Metformin XR 1500 mg daily in the morning     -follow up with [facility] in 1-2 weeks after discharge, CCRN to arrange appt. Prior to discharge   -upon discharge, patient will need the following supplies: Levemir insulin pens, \"BD\" (32G x 4mm) insulin pen needles, and Metformin prescription       Discussed plan of care with patient, PharmD, and primary team.  Thank you for this consult; Inpatient Diabetes will continue to follow.     To contact Endocrine Diabetes service:   From 8AM-4PM: page inpatient diabetes provider that is following the patient  For questions or updates from 4PM-8AM: page the diabetes job code for on call fellow: [phone]      80 minutes spent on the date of the encounter doing chart review, history and exam, documentation and further activities per the note  {Provider  Link to [facility] Help Grid :091533}    Over 50% of my time on the unit was spent counseling the patient and/or coordinating care regarding acute hyperglycemia management.  See note for details.    VIKASH Monroy, CNP  Inpatient Diabetes Management Service  Pager [phone]        "

## 2022-11-18 NOTE — PROCEDURES
Procedure:  Incision and Drainage of abscess of right buttocks  Pre procedure diagnosis: abscess of right buttocks  Post procedure diagnosis: same as above     Staff: Dr. Beltran  Resident: Hazel Corral MD, Doris Mcintyre MD     Procedure: The indication, risks, benefits and alternatives were discussed with the patient and verbal consent obtained. A timeout was performed prior to beginning. The area was prepped and draped in the usual sterile manner. The area of maximal fluctuance was anesthetized with 10cc of 1% lidocaine with epinephrine. A 3 cm linear incision was made with a 15 blade scalpel. About 5cc of purulent discharge was released. The underlying tissue was probed with a long Q tip and sterile scissors to break up loculations. The incision was enlarged by excising out 0.5 cm of skin in a half moon formation, extending right from the initial incision. Hemostasis was easily achieved with direct pressure and the wound was packed with half of a 4x4 guaze, then covered with a 4x4 gauze and tape. Blood loss was minimal. There were no apparent complications. Patient tolerated the procedure well.

## 2022-11-18 NOTE — PROGRESS NOTES
Observation Goals:     -Tolerating oral antibiotics or has plans for home infusion set up- Met. Currently taking and tolerating PO abx.    - Vital signs normal or at patient baseline: Met    - Adequate pain control on oral analgesia- Met     - Infection is improving-met abscess drained and dressed     - Color, warmth, movement, sensation of affected area or limb is intact or at baseline-  Met     - Return to baseline functional status-  Met. BG normalized    - Safe disposition plan has been identified-  met

## 2022-11-18 NOTE — PROGRESS NOTES
DC instructions given to pt, verbalized understanding.  All belongings with pt, IV DC'd and documented.     Pt ambulated to [facility] for discharge home. Train tokens provided to patient.

## 2022-11-18 NOTE — DISCHARGE INSTRUCTIONS
Follow up appointment:  Endocrinology Clinic  [facility]  [address] [place] [id number]    Ph: [phone]  11 a.m. on 11/29/22 follow up appointment made      IP Diabetes Management Team Discharge Instructions    Your weight loss Is likely because your blood sugar is too high and your diabetes is not controlled.   You need insulin to help improve your blood sugar and prevent future diabetes complications (vision problems, kidney problems, loss of limbs, infections, etc. )       Glucose Control Regimen:   -Levemir insulin 17 units daily in the morning                 -Metformin XR 1500 mg daily in the morning    Blood Glucose Checks:     -blood glucose monitoring twice daily (once in morning and once before a meal) and record, bring to clinic appt    Endocrinology Outpatient follow up: Please follow up with [facility] as scheduled on 11/29/ at 11am at the [facility] clinic on [facility].     If you have urgent questions or concerns regarding your blood sugars or insulin, you may contact [phone] (the main hospital ). Ask to speak with the endocrinologist on call.    Your target A1c value is less than 7%. Your most recent A1c is 12.8     Thank you for letting the Diabetes Management Team be involved in your care!    General Surgery Discharge Instructions  You had an incision and drainage of a buttock abscess. It is important to take care of this wound to prevent the abscess from reforming. Please pack a piece of guaze in the wound twice a day in order to keep the skin open while it heals from the bottom. Once the wound is so shallow that you can no longer pack it with gauze, it is okay to stop dressing changes. We will have someone reach out to you to schedule a follow up appointment with the General Surgery Clinic for a wound check.     - Call or return sooner than your regularly scheduled visit if you develop any of the following: fever >101.5, uncontrolled  pain, uncontrolled nausea or vomiting, as well as increased redness, swelling, or drainage from your wound.   -  If you have questions or to schedule a follow up appointment please call the General Surgery Clinic at [phone].  Call [phone] and ask to speak with the Surgery resident on call if you are having troubles in the evenings, at night, or on the weekend.

## 2022-11-18 NOTE — CONSULTS
Discharge Pharmacy Test Claim    Patient has $0 copays through [facility] commercial plan for formulary medications. Requested test claims listed below.    Test Claim Copay Notes   lantus solostar pens not covered semglee preferred   novolog flexpens 0.00    semglee pens 0.00        Lucy Martin  [facility] Pharmacy Liaison  Ph: [phone] Pager: [phone]   Securely message with the Vocera Web Console (learn more here)

## 2022-11-18 NOTE — PROGRESS NOTES
Observation Goals:     -Tolerating oral antibiotics or has plans for home infusion set up- Met. Currently taking and tolerating PO abx.    - Vital signs normal or at patient baseline: Met    - Adequate pain control on oral analgesia- Met     - Infection is improving- Not met but in progress-Surgery consult for abscess and possible cellulitis on R buttock.     - Color, warmth, movement, sensation of affected area or limb is intact or at baseline- Not met- General surgery consult for abscess.     - Return to baseline functional status- Not Met. BG still elevated- Endocrine to consult.     - Safe disposition plan has been identified-Not met

## 2022-11-18 NOTE — PHARMACY-ADMISSION MEDICATION HISTORY
Admission Medication History Completed by Pharmacy    See [facility] Admission Navigator for allergy information, preferred outpatient pharmacy, prior to admission medications and immunization status.     Medication History Sources: Patient and Chart Review     Changes made to PTA medication list (reason):    Added: None    Deleted: None    Changed: None    Medication Sig   bictegravir-emtricitabine-tenofovir (BIKTARVY) -25 MG per tablet Take 1 tablet by mouth daily   metFORMIN (GLUCOPHAGE XR) 500 MG 24 hr tablet Take 2 tablets (1,000 mg) by mouth daily with food   pantoprazole (PROTONIX) 20 MG EC tablet Take 1 tablet (20 mg) by mouth 2 times daily     Date completed: 11/18/22    Medication history completed by: Maranda Sweeney, PharmD, BCPS

## 2022-11-18 NOTE — DISCHARGE SUMMARY
[facility]  ED Observation Discharge Summary      Date of Admission:  11/17/2022  Date of Discharge:  11/18/2022  Discharging Provider: Maya Betts PA-C  Discharge Service: ED Observation Service    Discharge Diagnoses   Uncontrolled DM II  Abscess right buttock, s/p I&D 11/18/22  Paronychia of the left 4th toe  HIV  GERD    Follow-ups Needed After Discharge   Follow up with endocrine on 11/29/22 at 11:00 am  Follow up with general surgery for right buttock abscess in 2 weeks, they will arrange this appt    Unresulted Labs Ordered in the Past 30 Days of this Admission     No orders found for last 31 day(s).        Discharge Disposition   Discharged to home  Condition at discharge: Stable    Hospital Course   Andrew Lockwood is a 54 year old male admitted on 11/17/2022. He has a history of HIV, GERD, DM2, DAVID, MDD, presents to the ED with hyperglycemia.      # Hyperglycemia   # Uncontrolled type 2 Diabetes  Patient presents to the ED with high blood sugars and toe pain. He states he is out of his medications. Patient reports missing his endocrinology appointment on November 1st. He takes Metformin. He has been prescribed insulin in the past, but reports GI upset from insulin. Today the patient reports that his blood glucose was over 500.  He endorses polydipsia. He denies fevers or chills, nasal congestion or sore throat.  Started on an insulin pump in the ED. No anion gap or suggestion of metabolic derangement. In the ED: Vitals:BP:128/95  Pulse: 100 Temp: 98.2 Resp: 20 SP02:98 % Labs:Na 129, K 4.2 Cr 0.70, BUN 16.1, Alk phos 161, ALT 9, AST 17, , WBC 9.3, Hgb 15.0, Plts 344 Medications: NS IVF 1 L x 1.  Imaging: none Consults: Endocrine consult Plan: Admit to ED observation for hyperglycemic management and endocrine consult.     Patient was placed on insulin drip with improvement of his blood sugars. Endocrine was consulted and recommend starting Levemir 17  units once daily and for patient to resume Metformin XR 1500 mg daily. Patient will follow up with them on 11/29 at 11:00 am.     # Right buttock abscess s/p I&D 11/18/22  ER a number of times for gluteal abscesses, previously had been drained and placed on antibiotics. On exam, firm non fluctuant quarter sized area in the center of his right buttock. No fevers, chills, leukocytosis. Ultrasound showed a 1.0 x 0.9 x 0.9 cm fluid collection. Patient was started on PO Doxycycline. General surgery was consulted and performed I&D at bedside, and packed wound with half of a 4x4 gauze. There is no significant surrounding cellulitis, no need to continue antibiotic on discharge. Patient reports he has a friend to assist him with twice daily wound care.  -Packing supplies sent to pharmacy    # Paronychia of the left 4th toe  Patient endorsed 4th toe pain on his left foot. He expressed some purulent drainage yesterday.  He denies any trauma or injury. He denies fevers or chills. US of the left 4th toe shows no fluid collection.  -Warm soaks  -Topical mupirocin     # HIV: PTA Biktarvy, refill sent to pharmacy     # GERD: Continue PTA PPI, refill sent to pharmacy    Consultations This Hospital Stay   PHARMACY IP CONSULT  DIABETES EDUCATION IP CONSULT  ENDOCRINE DIABETES ADULT IP CONSULT  SURGERY GENERAL ADULT IP CONSULT  SURGERY GENERAL ADULT IP CONSULT  PHARMACY LIAISON FOR MEDICATION COVERAGE CONSULT    Code Status   Full Code    Time Spent on this Encounter   Maya KELLY PA-C, personally saw the patient today and spent greater than 30 minutes discharging this patient.     Maya Betts PA-C  [facility] [address] [facility] [address] [address] [address]  Phone: [phone]  Fax: [phone]  ______________________________________________________________________    Physical Exam   Vital Signs: Temp: 98.2  F (36.8  C) Temp src: Oral BP: 114/71 Pulse: 85   Resp: 16 SpO2:  100 % O2 Device: None (Room air)    Weight: 148 lbs .16 oz  Exam:  Constitutional: alert, no distress, and cooperative  Head: normocephalic, atraumatic  Neck: no asymmetry, masses, or scars  ENT: throat normal without erythema or exudate  Cardiovascular: RRR  Respiratory: diminished, respirations unlabored  Gastrointestinal: (+) BS, non tender, soft  Musculoskeletal: normal muscle tone, no pitting edema  Skin: quarter sized firm non fluctuant area on right buttock without any significant surrounding cellulitis  Neurologic: oriented x 3, moves all extremities, no slurred speech  Psychiatric: normal affect and mood       Primary Care Physician   Ayala Prieto    Discharge Orders      Reason for your hospital stay    You were hospitalized for management of your high blood sugars. You were placed on an insulin drip overnight and endocrinology was consulted. They recommend you start taking long acting insulin (17 units) once daily. Continue taking Metformin once daily. Please check your blood sugars three times daily and at night before bed, and record these numbers in a journal for endocrinology to review.    You had a small abscess on your right buttock that was drained by general surgery. They report you do not need antibiotics on discharge for this. Have your friend change the wound packing and dressing once daily. Instructions for this are provided.    For your paronychia on the toe, you can do warm soaks 3x daily. After the warm soaks, apply some topical Mupirocin.     Activity    Your activity upon discharge: activity as tolerated     Follow Up and recommended labs and tests    Follow up with endocrinology on 11/29/22 at 11:00 am for diabetic management  Follow up with PCP within 1-2 weeks for follow up of your right buttock abscess     When to contact your care team    Contact your care team for altered mental status, excessive thirst or fatigue, excessive urination, lethargy, persistent blood sugars >400 or  any other new or worsening symptoms.    If you develop fever, worsening purulent drainage, or worsening redness around your I&D site on the buttock, contact your care team.     Diet    Follow this diet upon discharge: Orders Placed This Encounter      Consistent Carbohydrate Diet Moderate Consistent Carb (60 g CHO per Meal) Diet       Significant Results and Procedures   Results for orders placed or performed during the hospital encounter of 11/17/22   Foot XR, G/E 3 views, left    Narrative    EXAM: XR FOOT LEFT G/E 3 VIEWS  LOCATION: [facility]  DATE/TIME: 11/17/2022 5:59 PM    INDICATION: diabetic, wound on fourth toe, eval for any bony changes concerning for infection  COMPARISON: None.      Impression    IMPRESSION: The left foot is negative for fracture. No radiographic evidence for osteomyelitis.   US Lower Extremity Non Vascular Left    Narrative    Exam: US LOWER EXTREMITY NON VASCULAR LEFT  11/17/2022 10:38 PM      History: Skin redness warmth pain left 4th toe. Evaluate for  cellulitis vs abscess.    Comparison:   None available     Findings: Targeted ultrasound of the left fourth toe. Soft tissue the  normal limits. No abnormal fluid collection.      Impression    Impression: No evidence of cellulitis or abscess overlying the left  fourth toe.    I have personally reviewed the examination and initial interpretation  and I agree with the findings.    AIDNA JACKMAN MD         SYSTEM ID:  [id number]   US Lower Extremity Non Vascular Right    Narrative    EXAMINATION: US LOWER EXTREMITY NON VASCULAR LEFT, 11/17/2022 10:38 PM    INDICATION: Skin redness warmth pain left 4th toe. Evaluate for  cellulitis vs abscess.    TECHNIQUE: Clinical area of interest was scanned in the standard  fashion with specialized ultrasound transducer(s) using both gray  scale and limited color/spectral Doppler techniques.    FINDINGS:  Location: Right buttocks  Size: 1.0 x 0.9 x 0.9  "cm  Composition/ Echogenicity: cystic with internal debris    Margins: irregular   Vascularity: not present  Subcutaneous edema and overlying soft tissue thickening of the right  buttocks.      Impression    IMPRESSION:  Findings suggestive of cellulitis involving the right buttocks with  1.0 x 0.9 x 0.9 cm fluid collection.    I have personally reviewed the examination and initial interpretation  and I agree with the findings.    ADINA JACKMAN MD         SYSTEM ID:  [id number]     *Note: Due to a large number of results and/or encounters for the requested time period, some results have not been displayed. A complete set of results can be found in Results Review.       Discharge Medications   Current Discharge Medication List      START taking these medications    Details   Alcohol Swabs PADS 1 swab daily  Qty: 100 each, Refills: 0    Associated Diagnoses: Status post incision and drainage      Gauze Pads & Dressings (GAUZE PADS 4\"X4\") 4\"X4\" PADS 2 each daily  Qty: 30 each, Refills: 0    Associated Diagnoses: Status post incision and drainage      insulin detemir (LEVEMIR PEN) 100 UNIT/ML pen Inject 17 Units Subcutaneous every 24 hours  Qty: 15 mL, Refills: 1    Associated Diagnoses: Type 2 diabetes mellitus with hyperglycemia, with long-term current use of insulin (H)      insulin pen needle (32G X 4 MM) 32G X 4 MM miscellaneous Use pen needles three times daily before meals and also before bed.  Qty: 200 each, Refills: 0    Associated Diagnoses: Type 2 diabetes mellitus with hyperglycemia, with long-term current use of insulin (H)      mupirocin (BACTROBAN) 2 % external ointment Apply topically 3 times daily  Qty: 15 g, Refills: 0    Associated Diagnoses: Paronychia of toe, left         CONTINUE these medications which have CHANGED    Details   bictegravir-emtricitabine-tenofovir (BIKTARVY) -25 MG per tablet Take 1 tablet by mouth daily  Qty: 30 tablet, Refills: 0    Associated Diagnoses: Human " "immunodeficiency virus (HIV) disease (H)      metFORMIN (GLUCOPHAGE XR) 500 MG 24 hr tablet Take 3 tablets (1,500 mg) by mouth daily (with dinner) With food  Qty: 90 tablet, Refills: 0    Associated Diagnoses: Type 2 diabetes mellitus with hyperglycemia, with long-term current use of insulin (H)      pantoprazole (PROTONIX) 20 MG EC tablet Take 1 tablet (20 mg) by mouth 2 times daily  Qty: 60 tablet, Refills: 0    Associated Diagnoses: Gastroesophageal reflux disease with esophagitis, unspecified whether hemorrhage           Allergies   Allergies   Allergen Reactions     Fentanyl Blisters     Per pt, after taking this medication had blisters develope     Metformin      Abdominal pain     Tylenol [Acetaminophen] Itching     Dulaglutide Rash     Insulin Lispro Rash     Patient reported     Penicillin V Other (See Comments) and Rash     Diffuse maculopapular rash + feels \"high\", per pt.      "

## 2022-11-18 NOTE — PROGRESS NOTES
Care Management Follow Up    Length of Stay (days): 0    Expected Discharge Date: 11/18/2022     Concerns to be Addressed:       Patient plan of care discussed at interdisciplinary rounds: Yes    Anticipated Discharge Disposition:       Anticipated Discharge Services:    Anticipated Discharge DME:      Patient/family educated on Medicare website which has current facility and service quality ratings:    Education Provided on the Discharge Plan:    Patient/Family in Agreement with the Plan:      Referrals Placed by CM/SW:    Private pay costs discussed: Not applicable    Additional Information:  RNCC notified by ED/OBS provider requesting after hospital follow up appointment made for [facility]. RNCC called to Endocrinology Clinic, for next available appointment, the  is sending this message ovewr to Endocrinology Clinic as the clinic has no openings until mid January, and they are asking for openings/double bookings, will call writer back/schedule appointment.     Endocrinology Clinic  [facility]  Ph: [phone]  11 a.m. on 11/29/22 follow up appointment made    GHAZALA Monroe, BA, RN, CMSRN, RNCC  Covering Units 6D/OBS  Pager: [phone]  Phone: [phone]  6th floor Weekend/Holiday Pager: [phone]  Observation weekend/after hours: [phone]

## 2022-11-18 NOTE — H&P
[facility]    History and Physical - Hospitalist Service       Date of Admission:  11/17/2022    Assessment & Plan      Andrew Lockwood is a 54 year old male admitted on 11/17/2022. He has a history of HIV, GERD, DM2, DAVID, MDD, presents to the ED with hyperglycemia.     # Hyperglycemia   # Type 2 Diabetes:  Patient presents to the ED with high blood sugars and toe pain. He states he is out of his medications. Patient reports missing his endocrinology appointment on November 1st. He takes Metformin. He has been prescribed insulin in the past, but reports GI upset from insulin. Today the patient reports that his blood glucose was over 500.  He endorses polydipsia.  He denies fevers or chills, nasal congestion or sore throat.  Started on an insulin pump in the ED. No anion gap or suggestion of metabolic derangement. In the ED: Vitals:BP:128/95  Pulse: 100 Temp: 98.2 Resp: 20 SP02:98 % Labs:Na 129, K 4.2 Cr 0.70, BUN 16.1, Alk phos 161, ALT 9, AST 17, , WBC 9.3, Hgb 15.0, Plts 344 Medications: NS IVF 1 L x 1.  Imaging: none Consults: Endocrine consult Plan: Admit to ED observation for hyperglycemic management and endocrine consult.   - Insulin drip - BG checks per protocol   - Endocrine consult for home regimen recommendations  - Diabetes educator consult.     # Recurrent right buttocks abscess   # paronychia abscess of the left 4th toe  ER a number of times for gluteal abscesses, previously had been drained and placed on antibiotics. On exam, firm non fluctuant quarter sized area in the center of his right buttock. At this time wound was cleaned, small area of pustule was incised and drained with moderate amount of purulent drainage.  Patient tolerated procedure well.  Dressing was applied.  Due to patient's surrounding cellulitis will place patient on a course of doxycycline.Also reports 4th toe pain on his left foot. He expressed purulent drainage yesterday.   He  "denies any trauma or injury. He denies fevers or chills. Preliminary read of US or right buttock shows 1.0 x 0.9 x 0.9 cm fluid collection. US of the left 4th toe shows no fluid collection.  - Surgery consult   - Ibuprofen for pain as needed  - Apply warm compresses   - Start Doxycycline BID    # HIV: PTA Biktarvy.     # GERD: IV pantoprazole BID     Diet:  Diabetic diet   DVT Prophylaxis: Ambulate every shift  Potts Catheter: Not present  Central Lines: None  Cardiac Monitoring: None  Code Status:   Full   =  Disposition Plan      Expected Discharge Date: 11/18/2022                The patient's care was discussed with the Bedside Nurse, Patient and ED KASSY, Sachin Cruz.    VIKASH Harris CNP_________________________________________________________________    Chief Complaint   Hyperglycemia, need for medication refills, left 4th toe pain.     History is obtained from the patient and chart review     History of Present Illness   Per ED note, \" Andrew Lockwood is a 54 year old male with a hx of HIV, GERD, DM2, DAVID, MDD, noncompliance with treatment, who presents to the ED with hyperglycemia.  Patient is a diabetic and is noncompliant with medications typically.  He is evidently supposed to be on insulin but reports he was taken off of it due to GI side effects.  He does report he takes metformin, but last took it 2 days ago.  He reports that he has not been able to follow-up with endocrinology and he was told to come to the emergency department to get his metformin.  Today the patient reports that his blood glucose was over 500.  He endorses polydipsia.  He denies fevers or chills, nasal congestion or sore throat.  No cough, shortness of breath, or chest pain.  No abdominal pain, nausea, vomiting, or diarrhea.  For the past few days he has noticed some pain and a wound along his left fourth toe.  He denies any history of diabetic neuropathy, but reports that he noticed a wound that was draining some purulent " "drainage yesterday.  It is painful for him.  No known trauma or injury.\"    Review of Systems    The 10 point Review of Systems is negative other than noted in the HPI or here. Left 4th toe pain, no fevers, chills, abdominal pain.     Past Medical History    I have reviewed this patient's medical history and updated it with pertinent information if needed.   Past Medical History:   Diagnosis Date     AIDS (H)      Allergic rhinitis due to other allergen     DNS     Anal dysplasia      Chronic abdominal pain      CNS toxoplasmosis (H)      Diabetes type 2, controlled (H)      GERD (gastroesophageal reflux disease)      History of seizure      History of substance abuse (H)      HIV (human immunodeficiency virus infection) (H)      HLD (hyperlipidemia)      Lung nodules      Periungual wart      PTSD (post-traumatic stress disorder)      Sleep apnea     doesn't use CPAP       Past Surgical History   I have reviewed this patient's surgical history and updated it with pertinent information if needed.  Past Surgical History:   Procedure Laterality Date     ANOSCOPY N/A 9/9/2020    Procedure: Exam Under Anesthesia, ANOSCOPY, fulgeration of rectal fissures with Rectal Biopsies;  Surgeon: Thanh Lundberg MD;  Location: [facility] OR     COLONOSCOPY Left 1/22/2016    Procedure: COMBINED COLONOSCOPY, SINGLE OR MULTIPLE BIOPSY/POLYPECTOMY BY BIOPSY;  Surgeon: Clark Saini MD;  Location: [facility] GI     ESOPHAGOSCOPY, GASTROSCOPY, DUODENOSCOPY (EGD), COMBINED N/A 6/6/2022    Procedure: ESOPHAGOGASTRODUODENOSCOPY, WITH BIOPSY;  Surgeon: Kecia Benítez MD;  [facility]     HC EXPLORE UNDESC TESTIS,INGUIN/SCROTAL       LAPAROSCOPIC APPENDECTOMY N/A 1/31/2018    Procedure: LAPAROSCOPIC APPENDECTOMY;  LAPAROSCOPIC APPENDECTOMY;  Surgeon: Dawn Holt MD;  Location: [facility] OR     LAPAROSCOPY DIAGNOSTIC (GENERAL) N/A 7/26/2016    Procedure: LAPAROSCOPY DIAGNOSTIC (GENERAL);  Surgeon: Susannah Arriaga MD;  Location: " [facility] OR     LAPAROSCOPY DIAGNOSTIC (GENERAL) N/A 4/16/2018    Procedure: LAPAROSCOPY DIAGNOSTIC (GENERAL);  Diagnostic laparoscopy and lysis of adhesions;  Surgeon: Prince Dowling MD;  Location: [facility] OR     OPTICAL TRACKING SYSTEM CRANIOTOMY, EXCISE TUMOR, COMBINED Left 4/10/2015    Procedure: COMBINED OPTICAL TRACKING SYSTEM CRANIOTOMY, EXCISE TUMOR;  Surgeon: Mirlande Colmenares MD;  Location: [facility] OR     REPAIR GAMEKEEPER'S THUMB Right 12/2/2016    Procedure: REPAIR LIGAMENT ULNAR COLLATERAL THUMB (GAMEKEEPER'S);  Surgeon: Evin Zamorano MD;  Location:  OR     RUST NONSPECIFIC PROCEDURE      right forearm fracture       Social History   I have reviewed this patient's social history and updated it with pertinent information if needed.  Social History     Tobacco Use     Smoking status: Some Days     Packs/day: 0.25     Years: 40.00     Pack years: 10.00     Types: Cigarettes     Smokeless tobacco: Former   Substance Use Topics     Alcohol use: No     Alcohol/week: 0.0 standard drinks     Comment: Last etoh in 2007     Drug use: Not Currently     Types: Marijuana, Methamphetamines       Family History   I have reviewed this patient's family history and updated it with pertinent information if needed.  Family History   Problem Relation Age of Onset     Diabetes Brother      Diabetes Father      Alzheimer Disease Father      Unknown/Adopted Mother      Diabetes Paternal Grandfather      Cancer No family hx of         no skin cancer     Skin Cancer No family hx of         no famiy hx of skin cancer     Glaucoma No family hx of      Macular Degeneration No family hx of        Prior to Admission Medications   Prior to Admission Medications   Prescriptions Last Dose Informant Patient Reported? Taking?   STATIN NOT PRESCRIBED (INTENTIONAL)  Self No No   Sig: Please choose reason not prescribed, below   alcohol swab prep pads   No No   Sig: Use to swab area of injection/todd as directed.  "  bictegravir-emtricitabine-tenofovir (BIKTARVY) -25 MG per tablet   No No   Sig: Take 1 tablet by mouth daily   blood glucose (NO BRAND SPECIFIED) test strip   No No   Sig: Use to test blood sugar 1 time daily or as directed. To accompany: Blood Glucose Monitor Brands: per insurance.   blood glucose monitoring (NO BRAND SPECIFIED) meter device kit   No No   Sig: Use to test blood sugar 1 time daily or as directed. Preferred blood glucose meter OR supplies to accompany: Blood Glucose Monitor Brands: per insurance.   metFORMIN (GLUCOPHAGE XR) 500 MG 24 hr tablet   No No   Sig: Take 2 tablets (1,000 mg) by mouth daily With food   pantoprazole (PROTONIX) 20 MG EC tablet   No No   Sig: Take 1 tablet (20 mg) by mouth 2 times daily   thin (NO BRAND SPECIFIED) lancets   No No   Sig: Use to test blood sugar 100 time daily or as directed. To accompany: Blood Glucose Monitor Brands: per insurance.      Facility-Administered Medications: None     Allergies   Allergies   Allergen Reactions     Metformin      Abdominal pain     Tylenol [Acetaminophen] Itching     Dulaglutide Rash     Insulin Lispro Rash     Patient reported     Penicillin V Other (See Comments) and Rash     Diffuse maculopapular rash + feels \"high\", per pt.        Physical Exam   Vital Signs: Temp: 97.8  F (36.6  C) Temp src: Oral BP: 136/88 Pulse: 104   Resp: 18 SpO2: 98 % O2 Device: None (Room air)    Weight: 138 lbs 0 oz    Cardio: Regular rate, extremities well perfused  Resp: Normal work of breathing, grossly normal respiratory rate  Neuro: Alert. CN II-XII grossly intact. Grossly intact strength.   Ext: L fourth toe -dried blood noted along the toe.  Some discoloration noted proximal buttock with what appears to be superficial furuncle.      Data   Data reviewed today: I reviewed all medications, new labs and imaging results over the last 24 hours.   Recent Labs   Lab 11/17/22 2138 11/17/22 2037 11/17/22  1928 11/17/22  1734 11/17/22  1730   WBC  " --   --   --   --  9.3   HGB  --   --   --   --  15.0   MCV  --   --   --   --  91   PLT  --   --   --   --  344   NA  --   --   --   --  129*   POTASSIUM  --   --   --   --  4.2   CHLORIDE  --   --   --   --  92*   CO2  --   --   --   --  26   BUN  --   --   --   --  16.1   CR  --   --   --   --  0.70   ANIONGAP  --   --   --   --  11   LINDA  --   --   --   --  10.8*   * 517* 439*   < > 596*   ALBUMIN  --   --   --   --  4.2   PROTTOTAL  --   --   --   --  7.8   BILITOTAL  --   --   --   --  0.6   ALKPHOS  --   --   --   --  161*   ALT  --   --   --   --  9*   AST  --   --   --   --  17    < > = values in this interval not displayed.     Most Recent 3 CBC's:Recent Labs   Lab Test 11/17/22  1730 10/13/22  1551 09/08/22  0622   WBC 9.3 7.5 6.2   HGB 15.0 14.6 14.0   MCV 91 85 83    375 309     Most Recent 3 BMP's:Recent Labs   Lab Test 11/17/22  2138 11/17/22  2037 11/17/22  1928 11/17/22  1734 11/17/22  1730 10/13/22  1551 09/08/22  1420 09/08/22  0849   NA  --   --   --   --  129* 131*  --  128*   POTASSIUM  --   --   --   --  4.2 4.2  --  4.1   CHLORIDE  --   --   --   --  92* 95*  --  97*   CO2  --   --   --   --  26 25  --  19*   BUN  --   --   --   --  16.1 17.9  --  17.6   CR  --   --   --   --  0.70 0.68  --  0.63*   ANIONGAP  --   --   --   --  11 11  --  12   LINDA  --   --   --   --  10.8* 10.1*  --  8.9   * 517* 439*   < > 596* 475*   < > 594*    < > = values in this interval not displayed.     Most Recent 2 LFT's:Recent Labs   Lab Test 11/17/22  1730 10/13/22  1551   AST 17 20   ALT 9* 25   ALKPHOS 161* 122   BILITOTAL 0.6 0.6     Recent Results (from the past 24 hour(s))   Foot XR, G/E 3 views, left    Narrative    EXAM: XR FOOT LEFT G/E 3 VIEWS  LOCATION: [facility]  DATE/TIME: 11/17/2022 5:59 PM    INDICATION: diabetic, wound on fourth toe, eval for any bony changes concerning for infection  COMPARISON: None.      Impression     IMPRESSION: The left foot is negative for fracture. No radiographic evidence for osteomyelitis.   US Lower Extremity Non Vascular Left    Impression    RESIDENT PRELIMINARY INTERPRETATION  IMPRESSION:  Findings suggestive of cellulitis involving the right buttocks with  1.0 x 0.9 x 0.9 cm fluid collection.

## 2022-11-18 NOTE — PROGRESS NOTES
Observation Goals:    -Tolerating oral antibiotics or has plans for home infusion set up- Met. PO abx- so far tolerating.    - Vital signs normal or at patient baseline: Met    - Adequate pain control on oral analgesia- Currently meeting    - Infection is improving- in process    - Color, warmth, movement, sensation of affected area or limb is intact or at baseline- Not met    - Return to baseline functional status- Met     - Safe disposition plan has been identified-Not met

## 2022-11-18 NOTE — CONSULTS
EGS Surgery Consult  2022    Andrew Lockwood  : 1967    Date of Service: 2022 8:00 AM    Assessment and Plan:  Andrew Lockwood is a 54 year old male with history of HIV, DM2, GERD, MDD who presented to the ED with hyperglycemia and was found to have a right buttock abscess requiring drainage. This was I&D at the bedside, which he tolerated well.     - appreciate medical management of diabetes as this will facilitate better wound healing  - continue BID dressing changes    - pack with 2x2 gauze in order to keep skin unopposed while cavity heals  - no antibiotics indicated  - will arrange for follow up in general surgery clinic in 2 weeks  - ok to discharge from surgery perspective     Discussed with Dr. Beltran.    Doris Mcintyre MD  General Surgery Resident    --------------------------------------------------------------------  History of Present Illness:    Andrew Lockwood is a 54 year old male with a history of HIV, DM2, GERD, and MDD who presented to the ED with hyperglycemia. General Surgery is consulted to evaluate a right buttock abscess. Patient reports that he has had abscesses on his extremities in the past requiring I&D. However, he states that about 3 weeks ago, he developed a tender bump on his mid right buttock that has gradually grown and become more painful. He says that this did spontaneously drain purulent fluid last week but has since closed and again become more painful. Feels like his previous abscesses. Denies fevers, chills, nausea, vomiting. Otherwise feeling well.    Past Medical History:  Past Medical History:   Diagnosis Date     AIDS (H)      Allergic rhinitis due to other allergen     DNS     Anal dysplasia      Chronic abdominal pain      CNS toxoplasmosis (H)      Diabetes type 2, controlled (H)      GERD (gastroesophageal reflux disease)      History of seizure      History of substance abuse (H)      HIV (human immunodeficiency virus infection) (H)      HLD  (hyperlipidemia)      Lung nodules      Periungual wart      PTSD (post-traumatic stress disorder)      Sleep apnea     doesn't use CPAP       Past Surgical History  Past Surgical History:   Procedure Laterality Date     ANOSCOPY N/A 9/9/2020    Procedure: Exam Under Anesthesia, ANOSCOPY, fulgeration of rectal fissures with Rectal Biopsies;  Surgeon: Thanh Lundberg MD;  [facility]     COLONOSCOPY Left 1/22/2016    Procedure: COMBINED COLONOSCOPY, SINGLE OR MULTIPLE BIOPSY/POLYPECTOMY BY BIOPSY;  Surgeon: lCark Saini MD;  [facility]     ESOPHAGOSCOPY, GASTROSCOPY, DUODENOSCOPY (EGD), COMBINED N/A 6/6/2022    Procedure: ESOPHAGOGASTRODUODENOSCOPY, WITH BIOPSY;  Surgeon: Kecia Benítez MD;  Location: [facility]     HC EXPLORE UNDESC TESTIS,INGUIN/SCROTAL       LAPAROSCOPIC APPENDECTOMY N/A 1/31/2018    Procedure: LAPAROSCOPIC APPENDECTOMY;  LAPAROSCOPIC APPENDECTOMY;  Surgeon: Dawn Holt MD;  [facility]     LAPAROSCOPY DIAGNOSTIC (GENERAL) N/A 7/26/2016    Procedure: LAPAROSCOPY DIAGNOSTIC (GENERAL);  Surgeon: Susannah Arriaga MD;  [facility]     LAPAROSCOPY DIAGNOSTIC (GENERAL) N/A 4/16/2018    Procedure: LAPAROSCOPY DIAGNOSTIC (GENERAL);  Diagnostic laparoscopy and lysis of adhesions;  Surgeon: Prince Dowling MD;  [facility]     OPTICAL TRACKING SYSTEM CRANIOTOMY, EXCISE TUMOR, COMBINED Left 4/10/2015    Procedure: COMBINED OPTICAL TRACKING SYSTEM CRANIOTOMY, EXCISE TUMOR;  Surgeon: Mirlande Colmenares MD;  [facility]     REPAIR GAMEKEEPER'S THUMB Right 12/2/2016    Procedure: REPAIR LIGAMENT ULNAR COLLATERAL THUMB (GAMEKEEPER'S);  Surgeon: Evin Zamorano MD;  [facility]     ZZC NONSPECIFIC PROCEDURE      right forearm fracture       Family History:  Family History   Problem Relation Age of Onset     Diabetes Brother      Diabetes Father      Alzheimer Disease Father      Unknown/Adopted Mother      Diabetes Paternal Grandfather       Cancer No family hx of         no skin cancer     Skin Cancer No family hx of         no famiy hx of skin cancer     Glaucoma No family hx of      Macular Degeneration No family hx of        Social History:  Social History     Socioeconomic History     Marital status: Single     Spouse name: Not on file     Number of children: Not on file     Years of education: Not on file     Highest education level: Not on file   Occupational History     Occupation:      Comment: 5 years; temp agency     Occupation: Unemployed   Tobacco Use     Smoking status: Some Days     Packs/day: 0.25     Years: 40.00     Pack years: 10.00     Types: Cigarettes     Smokeless tobacco: Former   Substance and Sexual Activity     Alcohol use: No     Alcohol/week: 0.0 standard drinks     Comment: Last etoh in 2007     Drug use: Not Currently     Types: Marijuana, Methamphetamines     Sexual activity: Not Currently     Partners: Female, Male     Comment: Last sexual activity 2008   Other Topics Concern     Parent/sibling w/ CABG, MI or angioplasty before 65F 55M? Not Asked   Social History Narrative    Born in [facility].  Came to the [place] in [place].  Last traveled to visit family in 2008.        1/7/2019 - Homeless. Working with a  at [employer] trying to get housing. Sexually active with two partners recently using condoms 100% of the time. Smokes occasionally, not for the last 4 weeks.        [facility] 1/1/[facility]. 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  Care established with ID and endocrine     Social Determinants of Health     Financial Resource Strain: Not on file   Food Insecurity: Not on file   Transportation Needs: Not on file   Physical Activity: Not on file   Stress: Not on file   Social Connections: Not on file   Intimate Partner Violence: Not on file   Housing Stability: Not on file       Medications:  Current Facility-Administered Medications   Medication     bictegravir-emtricitabine-tenofovir  "(BIKTARVY) -25 MG per tablet 1 tablet     dextrose 10% infusion     glucose gel 15-30 g    Or     dextrose 50 % injection 25-50 mL    Or     glucagon injection 1 mg     doxycycline hyclate (VIBRAMYCIN) capsule 100 mg     ibuprofen (ADVIL/MOTRIN) tablet 600 mg     [Held by provider] insulin 1 unit/mL in saline (NovoLIN, HumuLIN Regular) drip - ADULT IV Infusion     insulin aspart (NovoLOG) injection (RAPID ACTING)     insulin aspart (NovoLOG) injection (RAPID ACTING)     insulin detemir (LEVEMIR PEN) injection 17 Units     lidocaine (LMX4) cream     lidocaine 1 % 0.1-1 mL     metFORMIN (GLUCOPHAGE XR) 24 hr tablet 1,500 mg     ondansetron (ZOFRAN ODT) ODT tab 4 mg    Or     ondansetron (ZOFRAN) injection 4 mg     pantoprazole (PROTONIX) EC tablet 40 mg     sodium chloride (PF) 0.9% PF flush 3 mL     sodium chloride (PF) 0.9% PF flush 3 mL     sodium chloride 0.9% infusion       Allergies:  Allergies   Allergen Reactions     Fentanyl Blisters     Per pt, after taking this medication had blisters develope     Metformin      Abdominal pain     Tylenol [Acetaminophen] Itching     Dulaglutide Rash     Insulin Lispro Rash     Patient reported     Penicillin V Other (See Comments) and Rash     Diffuse maculopapular rash + feels \"high\", per pt.        Review of Symptoms:  A 10 point review of symptoms has been conducted and is negative except for that mentioned in the above HPI.    Physical Exam:  /71 (BP Location: Right arm, Patient Position: Left side, Cuff Size: Adult Regular)   Pulse 85   Temp 98.2  F (36.8  C) (Oral)   Resp 16   Ht 1.6 m (5' 3\")   Wt 67.1 kg (148 lb 0.2 oz)   SpO2 100%   BMI 26.22 kg/m     Gen:    Lying in bed in NAD, A&OX3  HEENT: Normocephalic and atraumatic  CV:  RRR  Pulm:  Non-labored breathing  Buttock: Right mid buttock with 5cm x 3cm area of induration, tenderness, central area of fluctuance with overlying erythema, no active drainage.     Ext:  Warm and well perfused, no " obvious deformities    Labs:  All labs reviewed.  WBC 8.6  Hgb 12.9    Imaging:  No pertinent imaging.

## 2022-11-19 ENCOUNTER — HOSPITAL ENCOUNTER (EMERGENCY)
Facility: CLINIC | Age: 57
Discharge: HOME | End: 2022-11-19
Attending: EMERGENCY MEDICINE | Admitting: EMERGENCY MEDICINE
Payer: COMMERCIAL

## 2022-11-19 VITALS
SYSTOLIC BLOOD PRESSURE: 127 MMHG | OXYGEN SATURATION: 97 % | HEART RATE: 67 BPM | RESPIRATION RATE: 16 BRPM | DIASTOLIC BLOOD PRESSURE: 68 MMHG | TEMPERATURE: 98.7 F

## 2022-11-19 DIAGNOSIS — Z59.00 HOMELESS: ICD-10-CM

## 2022-11-19 DIAGNOSIS — Z51.89 WOUND CHECK, ABSCESS: ICD-10-CM

## 2022-11-19 LAB — GLUCOSE BLDC GLUCOMTR-MCNC: 303 MG/DL (ref 70–99)

## 2022-11-19 PROCEDURE — 82962 GLUCOSE BLOOD TEST: CPT

## 2022-11-19 PROCEDURE — 99282 EMERGENCY DEPT VISIT SF MDM: CPT | Performed by: EMERGENCY MEDICINE

## 2022-11-19 RX ORDER — LIDOCAINE HYDROCHLORIDE 10 MG/ML
INJECTION, SOLUTION EPIDURAL; INFILTRATION; INTRACAUDAL; PERINEURAL
Status: DISCONTINUED
Start: 2022-11-19 | End: 2022-11-19 | Stop reason: HOSPADM

## 2022-11-19 SDOH — ECONOMIC STABILITY - HOUSING INSECURITY: HOMELESSNESS UNSPECIFIED: Z59.00

## 2022-11-19 ASSESSMENT — ENCOUNTER SYMPTOMS
COLOR CHANGE: 0
EYE REDNESS: 0
ABDOMINAL PAIN: 0
DIFFICULTY URINATING: 0
ARTHRALGIAS: 0
FEVER: 0
NECK STIFFNESS: 0
WOUND: 1
SHORTNESS OF BREATH: 0
CONFUSION: 0
HEADACHES: 0

## 2022-11-19 ASSESSMENT — ACTIVITIES OF DAILY LIVING (ADL)
ADLS_ACUITY_SCORE: 35
ADLS_ACUITY_SCORE: 37

## 2022-11-19 NOTE — ED TRIAGE NOTES
Wound drained and was told that dressing needed to be changed daily. Unable to do thi himself d/t living in a shelter and wound needs to be packed.

## 2022-11-19 NOTE — ED PROVIDER NOTES
[facility] EMERGENCY DEPARTMENT ([facility])    11/19/22          History     Chief Complaint   Patient presents with     Wound Check     The history is provided by the patient and medical records.     Andrew Lockwood is a 54 year old male with past medical history significant for HIV, GERD, DM2, DAVID, HLD, substance abuse, MDD who presents to the ED for a wound check.  The patient states that he needs the dressings on his right buttock changed.  He confirms he has been taking his metformin and Levemir and took these today.    Per chart review, patient was recently admitted at [facility] 11/17/2022 - 11/18/2022 for uncontrolled DM2 and a right buttock abscess. Ultrasound showed a 1.0 x 0.9 x 0.9 cm fluid collection. Patient was started on PO Doxycycline. General surgery was consulted and performed I&D at bedside, and packed wound with half of a 4x4 gauze. There was no significant surrounding cellulitis, no need to continue antibiotic on discharge.    Past Medical History  Past Medical History:   Diagnosis Date     AIDS (H)      Allergic rhinitis due to other allergen     DNS     Anal dysplasia      Chronic abdominal pain      CNS toxoplasmosis (H)      Diabetes type 2, controlled (H)      GERD (gastroesophageal reflux disease)      History of seizure      History of substance abuse (H)      HIV (human immunodeficiency virus infection) (H)      HLD (hyperlipidemia)      Lung nodules      Periungual wart      PTSD (post-traumatic stress disorder)      Sleep apnea     doesn't use CPAP     Past Surgical History:   Procedure Laterality Date     ANOSCOPY N/A 9/9/2020    Procedure: Exam Under Anesthesia, ANOSCOPY, fulgeration of rectal fissures with Rectal Biopsies;  Surgeon: Thanh Lundberg MD;  [facility]     COLONOSCOPY Left 1/22/2016    Procedure: COMBINED COLONOSCOPY, SINGLE OR MULTIPLE BIOPSY/POLYPECTOMY BY BIOPSY;  Surgeon: Clark Saini MD;  [facility]     ESOPHAGOSCOPY, GASTROSCOPY,  "DUODENOSCOPY (EGD), COMBINED N/A 6/6/2022    Procedure: ESOPHAGOGASTRODUODENOSCOPY, WITH BIOPSY;  Surgeon: Kecia Benítez MD;  [facility]     HC EXPLORE UNDESC TESTIS,INGUIN/SCROTAL       LAPAROSCOPIC APPENDECTOMY N/A 1/31/2018    Procedure: LAPAROSCOPIC APPENDECTOMY;  LAPAROSCOPIC APPENDECTOMY;  Surgeon: Dawn Holt MD;  [facility]     LAPAROSCOPY DIAGNOSTIC (GENERAL) N/A 7/26/2016    Procedure: LAPAROSCOPY DIAGNOSTIC (GENERAL);  Surgeon: Susannah Arriaga MD;  [facility]     LAPAROSCOPY DIAGNOSTIC (GENERAL) N/A 4/16/2018    Procedure: LAPAROSCOPY DIAGNOSTIC (GENERAL);  Diagnostic laparoscopy and lysis of adhesions;  Surgeon: Prince Dowling MD;  [facility]     OPTICAL TRACKING SYSTEM CRANIOTOMY, EXCISE TUMOR, COMBINED Left 4/10/2015    Procedure: COMBINED OPTICAL TRACKING SYSTEM CRANIOTOMY, EXCISE TUMOR;  Surgeon: Mirlande Colmenares MD;  [facility]     REPAIR GAMEKEEPER'S THUMB Right 12/2/2016    Procedure: REPAIR LIGAMENT ULNAR COLLATERAL THUMB (GAMEKEEPER'S);  Surgeon: Evin Zamorano MD;  [facility]     ZZC NONSPECIFIC PROCEDURE      right forearm fracture     Alcohol Swabs PADS  bictegravir-emtricitabine-tenofovir (BIKTARVY) -25 MG per tablet  Gauze Pads & Dressings (GAUZE PADS 4\"X4\") 4\"X4\" PADS  insulin detemir (LEVEMIR PEN) 100 UNIT/ML pen  insulin pen needle (32G X 4 MM) 32G X 4 MM miscellaneous  metFORMIN (GLUCOPHAGE XR) 500 MG 24 hr tablet  mupirocin (BACTROBAN) 2 % external ointment  pantoprazole (PROTONIX) 20 MG EC tablet  sterile water, bottle, irrigation      Allergies   Allergen Reactions     Fentanyl Blisters     Per pt, after taking this medication had blisters develope     Metformin      Abdominal pain     Tylenol [Acetaminophen] Itching     Dulaglutide Rash     Insulin Lispro Rash     Patient reported     Penicillin V Other (See Comments) and Rash     Diffuse maculopapular rash + feels \"high\", per pt.      Family " History  Family History   Problem Relation Age of Onset     Diabetes Brother      Diabetes Father      Alzheimer Disease Father      Unknown/Adopted Mother      Diabetes Paternal Grandfather      Cancer No family hx of         no skin cancer     Skin Cancer No family hx of         no famiy hx of skin cancer     Glaucoma No family hx of      Macular Degeneration No family hx of      Social History   Social History     Tobacco Use     Smoking status: Some Days     Packs/day: 0.25     Years: 40.00     Pack years: 10.00     Types: Cigarettes     Smokeless tobacco: Former   Substance Use Topics     Alcohol use: No     Alcohol/week: 0.0 standard drinks     Comment: Last etoh in 2007     Drug use: Not Currently     Types: Marijuana, Methamphetamines      Past medical history, past surgical history, medications, allergies, family history, and social history were reviewed with the patient. No additional pertinent items.       Review of Systems   Constitutional: Negative for fever.   HENT: Negative for congestion.    Eyes: Negative for redness.   Respiratory: Negative for shortness of breath.    Cardiovascular: Negative for chest pain.   Gastrointestinal: Negative for abdominal pain.   Genitourinary: Negative for difficulty urinating.   Musculoskeletal: Negative for arthralgias and neck stiffness.   Skin: Positive for wound. Negative for color change.   Neurological: Negative for headaches.   Psychiatric/Behavioral: Negative for confusion.     A complete review of systems was performed with pertinent positives and negatives noted in the HPI, and all other systems negative.    Physical Exam   BP: 127/68  Pulse: 67  Temp: 98.7  F (37.1  C)  Resp: 16  SpO2: 97 %  Physical Exam  Constitutional:       Appearance: He is well-developed and well-nourished.   HENT:      Head: Normocephalic and atraumatic.   Cardiovascular:      Rate and Rhythm: Normal rate and regular rhythm.      Heart sounds: Normal heart sounds.   Pulmonary:       Effort: Pulmonary effort is normal. No respiratory distress.      Breath sounds: No wheezing.   Abdominal:      General: There is no distension.      Palpations: Abdomen is soft.      Tenderness: There is no abdominal tenderness. There is no rebound.   Musculoskeletal:      Cervical back: Normal range of motion and neck supple.      Comments: Right buttocks with small 1 cm x 1 cm incision for an abscess.  There is no surrounding cellulitis.  There is no acute drainage.  There is no foul smell.  The packing was removed.  Minimal tenderness.   Skin:     General: Skin is warm.   Neurological:      Mental Status: He is alert and oriented to person, place, and time.   Psychiatric:         Mood and Affect: Mood and affect normal.         Behavior: Behavior normal.         Thought Content: Thought content normal.         ED Course     4:03 PM  The patient was seen and examined by Analilia Galeas MD in Room ED02.     Procedures       The medical record was reviewed and interpreted.  Current labs reviewed and interpreted.              No results found for any visits on 11/19/22.  Medications - No data to display     Assessments & Plan (with Medical Decision Making)   Patient is a well-known gentleman who presented to the ER due to wanting his wound repacked.  Patient was discharged from the ED observation unit yesterday after having hyperglycemia and abscess in his right buttocks.  The buttocks was drained by general surgery bedside.  I removed the dressing and put in a new packing and dressing.  Patient tolerated the procedure well.  No signs of infection to the area.  Patient instructed on appropriate wound care for the future.  Patient says that he did take his insulin this morning and his metformin.  Patient's sugars elevated to 300.  Patient says his sugars elevated because the shelter does not have diabetic diet food.  Patient will be given 4 units of insulin subcutaneous in the ED.  Patient is feeling well and  says normally his sugars are in the 500-700s.  Patient does not appear to have any signs of DKA and is asymptomatic.  Plan will be to discharge him home with outpatient follow-up.    I have reviewed the nursing notes. I have reviewed the findings, diagnosis, plan and need for follow up with the patient.    New Prescriptions    No medications on file       Final diagnoses:   Wound check, abscess   Homeless     I, Toyin Louis, am serving as a trained medical scribe to document services personally performed by Analilia Galeas MD based on the provider's statements to me on November 19, 2022.  This document has been checked and approved by the attending provider.    I, Analilia Galeas MD, was physically present and have reviewed and verified the accuracy of this note documented by Toyin Louis, medical scribe.      --    [facility] EMERGENCY DEPARTMENT  11/19/2022     Analilia Galeas MD  11/19/22 [id number]

## 2022-11-21 ENCOUNTER — PATIENT OUTREACH (OUTPATIENT)
Dept: SURGERY | Facility: CLINIC | Age: 57
End: 2022-11-21
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
## 2022-11-21 NOTE — LETTER
November 23, 2022      TO: Andrew Lockwood  [address] [id number]         Dear Andrew Lockwood,     I have been trying to reach you by phone, but unfortunately have not been able to. This letters gives details you need for your upcoming appointment.        Appointment date December 02, 2022   Appointment time 9 AM  Note: Please arrive 15 minutes prior to your appointment   Provider Dr. Agiula Vaz   [facility] GENERAL SURGERY  [address]  [address] [place] [address]  Phone: [phone]  Fax: [phone]       Thank you for entrusting your care to [facility].      Sincerely,    The General Surgery Team

## 2022-11-21 NOTE — PROGRESS NOTES
11/21/22    10:20 AM     Andrew Lockwood is a patient of Dr. Ingrid Beltran that underwent bedside I&D of buttock abscess approximately 3 days ago (11/18).  Attempted to contact patient via telephone for a status update and review post-op  teaching.  LM on VM to call office.  Await return call.      Of note:  Wound:  Packing with sterile gauze, to be done daily  Follow-up:  12/2 at 0900 with Dr. Vaz   Restrictions:  - No activity restrictions  New medications:  Levemir, Metformin, Biktarvy, Protonix, Bactroban  Equipment/Supplies:  Wound care supplies: Gauze pads, sterile water for irrigation, tape

## 2022-11-22 NOTE — PROGRESS NOTES
Attempted to contact patient x 2 for post-op telephone call/status update.  LM on VM to call office-contact information provided.

## 2022-11-28 ENCOUNTER — TELEPHONE (OUTPATIENT)
Dept: ENDOCRINOLOGY | Facility: CLINIC | Age: 57
End: 2022-11-28

## 2022-11-28 NOTE — TELEPHONE ENCOUNTER
Outcome for 11/28/22 12:29 PM :Left Voicemail for patient to call back   Does not look like patient is checking glucose.  Appointment reminder phone call made to patient.   Nereyda Miranda

## 2022-12-01 ENCOUNTER — PRE VISIT (OUTPATIENT)
Dept: SURGERY | Facility: CLINIC | Age: 57
End: 2022-12-01

## 2022-12-01 NOTE — TELEPHONE ENCOUNTER
Patient Telephone Reminder Call    Date of call:  12/01/22  Phone numbers:  Home number on file [phone] (home)    Reached patient/confirmed appointment:  No - left message:   on voicemail  Appointment with:   Dr. Aguila Vaz  Reason for visit:  Post-op open wound  Remind patient that this visit is a consultation only, NO procedure:  Yes  Can this visit be changed to a video visit:  No
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
## 2022-12-07 ENCOUNTER — HOSPITAL ENCOUNTER (OUTPATIENT)
Facility: CLINIC | Age: 57
Setting detail: OBSERVATION
Discharge: NURSING, REHAB OR LONG-TERM CARE | End: 2022-12-10
Attending: EMERGENCY MEDICINE | Admitting: STUDENT IN AN ORGANIZED HEALTH CARE EDUCATION/TRAINING PROGRAM
Payer: COMMERCIAL

## 2022-12-07 DIAGNOSIS — E11.65 INADEQUATELY CONTROLLED DIABETES MELLITUS (H): ICD-10-CM

## 2022-12-07 DIAGNOSIS — Z11.52 ENCOUNTER FOR SCREENING LABORATORY TESTING FOR SEVERE ACUTE RESPIRATORY SYNDROME CORONAVIRUS 2 (SARS-COV-2): ICD-10-CM

## 2022-12-07 DIAGNOSIS — R10.84 ABDOMINAL PAIN, GENERALIZED: ICD-10-CM

## 2022-12-07 DIAGNOSIS — R73.9 HYPERGLYCEMIA: ICD-10-CM

## 2022-12-07 DIAGNOSIS — Z79.84 LONG TERM CURRENT USE OF ORAL HYPOGLYCEMIC DRUG: ICD-10-CM

## 2022-12-07 PROCEDURE — 83690 ASSAY OF LIPASE: CPT | Performed by: EMERGENCY MEDICINE

## 2022-12-07 PROCEDURE — 99285 EMERGENCY DEPT VISIT HI MDM: CPT | Mod: 25 | Performed by: EMERGENCY MEDICINE

## 2022-12-07 PROCEDURE — 93010 ELECTROCARDIOGRAM REPORT: CPT | Performed by: EMERGENCY MEDICINE

## 2022-12-07 PROCEDURE — 36415 COLL VENOUS BLD VENIPUNCTURE: CPT | Performed by: EMERGENCY MEDICINE

## 2022-12-07 PROCEDURE — 258N000003 HC RX IP 258 OP 636: Performed by: EMERGENCY MEDICINE

## 2022-12-07 PROCEDURE — 85025 COMPLETE CBC W/AUTO DIFF WBC: CPT | Performed by: EMERGENCY MEDICINE

## 2022-12-07 PROCEDURE — 96361 HYDRATE IV INFUSION ADD-ON: CPT | Performed by: EMERGENCY MEDICINE

## 2022-12-07 PROCEDURE — 83036 HEMOGLOBIN GLYCOSYLATED A1C: CPT

## 2022-12-07 PROCEDURE — 93005 ELECTROCARDIOGRAM TRACING: CPT | Performed by: EMERGENCY MEDICINE

## 2022-12-07 PROCEDURE — 80053 COMPREHEN METABOLIC PANEL: CPT | Performed by: EMERGENCY MEDICINE

## 2022-12-07 RX ORDER — HYDROMORPHONE HYDROCHLORIDE 1 MG/ML
0.5 INJECTION, SOLUTION INTRAMUSCULAR; INTRAVENOUS; SUBCUTANEOUS
Status: DISCONTINUED | OUTPATIENT
Start: 2022-12-07 | End: 2022-12-08

## 2022-12-07 RX ADMIN — SODIUM CHLORIDE 1000 ML: 9 INJECTION, SOLUTION INTRAVENOUS at 23:21

## 2022-12-07 NOTE — LETTER
Transition Communication Hand-off for Care Transitions to Next Level of Care Provider    Name: Anrdew Lockwood  : 1967  MRN #: [id number]  Primary Care Provider: Ayala Prieto  Primary Clinic: [address] [place] [id number]  Reason for Hospitalization:  Abdominal pain, generalized [R10.84]  Hyperglycemia [R73.9]  Admit Date/Time: 2022 11:50 PM  Discharge Date: 22  Payor Source: Payor: BCBS / Plan: BCBS INDIVIDUAL / Product Type: Indemnity /     Readmission Assessment Measure (SHIKHA) Risk Score/category: 49%         Reason for Communication Hand-off Referral: No support or lacking a support system    Discharge Plan:   Return to [address] [address]    Concern for non-adherence with plan of care:   Y/N yes  Discharge Needs Assessment:  Needs    Flowsheet Row Most Recent Value   Equipment Currently Used at Home none          Already enrolled in Tele-monitoring program and name of program:  No  Follow-up specialty is recommended: No    Follow-up plan:  No future appointments.    Any outstanding tests or procedures:              Key Recommendations:      Warner Salazar RN    AVS/Discharge Summary is the source of truth; this is a helpful guide for improved communication of patient story

## 2022-12-08 ENCOUNTER — APPOINTMENT (OUTPATIENT)
Dept: CT IMAGING | Facility: CLINIC | Age: 57
End: 2022-12-08
Attending: EMERGENCY MEDICINE
Payer: COMMERCIAL

## 2022-12-08 ENCOUNTER — APPOINTMENT (OUTPATIENT)
Dept: GENERAL RADIOLOGY | Facility: CLINIC | Age: 57
End: 2022-12-08
Payer: COMMERCIAL

## 2022-12-08 PROBLEM — R10.84 ABDOMINAL PAIN, GENERALIZED: Status: ACTIVE | Noted: 2022-12-08

## 2022-12-08 LAB
ALBUMIN SERPL BCG-MCNC: 3.4 G/DL (ref 3.5–5.2)
ALBUMIN SERPL BCG-MCNC: 4.3 G/DL (ref 3.5–5.2)
ALBUMIN UR-MCNC: 10 MG/DL
ALBUMIN UR-MCNC: 10 MG/DL
ALP SERPL-CCNC: 105 U/L (ref 40–129)
ALP SERPL-CCNC: 119 U/L (ref 40–129)
ALT SERPL W P-5'-P-CCNC: 10 U/L (ref 10–50)
ALT SERPL W P-5'-P-CCNC: 19 U/L (ref 10–50)
ANION GAP SERPL CALCULATED.3IONS-SCNC: 12 MMOL/L (ref 7–15)
ANION GAP SERPL CALCULATED.3IONS-SCNC: 18 MMOL/L (ref 7–15)
APPEARANCE UR: CLEAR
APPEARANCE UR: CLEAR
AST SERPL W P-5'-P-CCNC: 14 U/L (ref 10–50)
AST SERPL W P-5'-P-CCNC: 20 U/L (ref 10–50)
ATRIAL RATE - MUSE: 94 BPM
BASOPHILS # BLD AUTO: 0 10E3/UL (ref 0–0.2)
BASOPHILS # BLD AUTO: 0 10E3/UL (ref 0–0.2)
BASOPHILS NFR BLD AUTO: 0 %
BASOPHILS NFR BLD AUTO: 0 %
BILIRUB SERPL-MCNC: 0.6 MG/DL
BILIRUB SERPL-MCNC: 0.9 MG/DL
BILIRUB UR QL STRIP: NEGATIVE
BILIRUB UR QL STRIP: NEGATIVE
BUN SERPL-MCNC: 15.7 MG/DL (ref 6–20)
BUN SERPL-MCNC: 18.3 MG/DL (ref 6–20)
BURR CELLS BLD QL SMEAR: SLIGHT
CALCIUM SERPL-MCNC: 10.2 MG/DL (ref 8.6–10)
CALCIUM SERPL-MCNC: 8.4 MG/DL (ref 8.6–10)
CHLORIDE SERPL-SCNC: 105 MMOL/L (ref 98–107)
CHLORIDE SERPL-SCNC: 99 MMOL/L (ref 98–107)
COLOR UR AUTO: ABNORMAL
COLOR UR AUTO: ABNORMAL
CREAT SERPL-MCNC: 0.55 MG/DL (ref 0.67–1.17)
CREAT SERPL-MCNC: 0.68 MG/DL (ref 0.67–1.17)
DEPRECATED HCO3 PLAS-SCNC: 18 MMOL/L (ref 22–29)
DEPRECATED HCO3 PLAS-SCNC: 21 MMOL/L (ref 22–29)
DIASTOLIC BLOOD PRESSURE - MUSE: NORMAL MMHG
EOSINOPHIL # BLD AUTO: 0.1 10E3/UL (ref 0–0.7)
EOSINOPHIL # BLD AUTO: 0.1 10E3/UL (ref 0–0.7)
EOSINOPHIL NFR BLD AUTO: 1 %
EOSINOPHIL NFR BLD AUTO: 2 %
ERYTHROCYTE [DISTWIDTH] IN BLOOD BY AUTOMATED COUNT: 12.6 % (ref 10–15)
ERYTHROCYTE [DISTWIDTH] IN BLOOD BY AUTOMATED COUNT: 12.8 % (ref 10–15)
FLUAV RNA SPEC QL NAA+PROBE: NEGATIVE
FLUBV RNA RESP QL NAA+PROBE: NEGATIVE
GFR SERPL CREATININE-BSD FRML MDRD: >90 ML/MIN/1.73M2
GFR SERPL CREATININE-BSD FRML MDRD: >90 ML/MIN/1.73M2
GLUCOSE BLDC GLUCOMTR-MCNC: 168 MG/DL (ref 70–99)
GLUCOSE BLDC GLUCOMTR-MCNC: 220 MG/DL (ref 70–99)
GLUCOSE BLDC GLUCOMTR-MCNC: 255 MG/DL (ref 70–99)
GLUCOSE BLDC GLUCOMTR-MCNC: 277 MG/DL (ref 70–99)
GLUCOSE BLDC GLUCOMTR-MCNC: 300 MG/DL (ref 70–99)
GLUCOSE SERPL-MCNC: 239 MG/DL (ref 70–99)
GLUCOSE SERPL-MCNC: 318 MG/DL (ref 70–99)
GLUCOSE UR STRIP-MCNC: >=1000 MG/DL
GLUCOSE UR STRIP-MCNC: >=1000 MG/DL
HBA1C MFR BLD: 12.8 %
HCT VFR BLD AUTO: 28.1 % (ref 40–53)
HCT VFR BLD AUTO: 48.3 % (ref 40–53)
HGB BLD-MCNC: 12.9 G/DL (ref 13.3–17.7)
HGB BLD-MCNC: 16.6 G/DL (ref 13.3–17.7)
HGB BLD-MCNC: 9.1 G/DL (ref 13.3–17.7)
HGB UR QL STRIP: NEGATIVE
HGB UR QL STRIP: NEGATIVE
HOLD SPECIMEN: NORMAL
IMM GRANULOCYTES # BLD: 0 10E3/UL
IMM GRANULOCYTES # BLD: 0.1 10E3/UL
IMM GRANULOCYTES NFR BLD: 0 %
IMM GRANULOCYTES NFR BLD: 1 %
INR PPP: 1.07 (ref 0.85–1.15)
INTERPRETATION ECG - MUSE: NORMAL
KETONES BLD-SCNC: 0 MMOL/L (ref 0–0.6)
KETONES UR STRIP-MCNC: 60 MG/DL
KETONES UR STRIP-MCNC: 60 MG/DL
L PNEUMO1 AG UR QL IA: NEGATIVE
LACTATE SERPL-SCNC: 1 MMOL/L (ref 0.7–2)
LEUKOCYTE ESTERASE UR QL STRIP: NEGATIVE
LEUKOCYTE ESTERASE UR QL STRIP: NEGATIVE
LIPASE SERPL-CCNC: 34 U/L (ref 13–60)
LYMPHOCYTES # BLD AUTO: 1.7 10E3/UL (ref 0.8–5.3)
LYMPHOCYTES # BLD AUTO: 2.4 10E3/UL (ref 0.8–5.3)
LYMPHOCYTES NFR BLD AUTO: 18 %
LYMPHOCYTES NFR BLD AUTO: 23 %
MCH RBC QN AUTO: 30.6 PG (ref 26.5–33)
MCH RBC QN AUTO: 31.4 PG (ref 26.5–33)
MCHC RBC AUTO-ENTMCNC: 32.4 G/DL (ref 31.5–36.5)
MCHC RBC AUTO-ENTMCNC: 34.4 G/DL (ref 31.5–36.5)
MCV RBC AUTO: 91 FL (ref 78–100)
MCV RBC AUTO: 95 FL (ref 78–100)
MONOCYTES # BLD AUTO: 0.6 10E3/UL (ref 0–1.3)
MONOCYTES # BLD AUTO: 0.8 10E3/UL (ref 0–1.3)
MONOCYTES NFR BLD AUTO: 6 %
MONOCYTES NFR BLD AUTO: 7 %
MRSA DNA SPEC QL NAA+PROBE: NEGATIVE
NEUTROPHILS # BLD AUTO: 5.1 10E3/UL (ref 1.6–8.3)
NEUTROPHILS # BLD AUTO: 9.9 10E3/UL (ref 1.6–8.3)
NEUTROPHILS NFR BLD AUTO: 68 %
NEUTROPHILS NFR BLD AUTO: 74 %
NITRATE UR QL: NEGATIVE
NITRATE UR QL: NEGATIVE
NRBC # BLD AUTO: 0 10E3/UL
NRBC # BLD AUTO: 0 10E3/UL
NRBC BLD AUTO-RTO: 0 /100
NRBC BLD AUTO-RTO: 0 /100
P AXIS - MUSE: 37 DEGREES
PH UR STRIP: 5.5 [PH] (ref 5–7)
PH UR STRIP: 5.5 [PH] (ref 5–7)
PLAT MORPH BLD: ABNORMAL
PLATELET # BLD AUTO: 187 10E3/UL (ref 150–450)
PLATELET # BLD AUTO: 404 10E3/UL (ref 150–450)
POTASSIUM SERPL-SCNC: 3.5 MMOL/L (ref 3.4–5.3)
POTASSIUM SERPL-SCNC: 3.7 MMOL/L (ref 3.4–5.3)
PR INTERVAL - MUSE: 136 MS
PROT SERPL-MCNC: 6.6 G/DL (ref 6.4–8.3)
PROT SERPL-MCNC: 7.7 G/DL (ref 6.4–8.3)
QRS DURATION - MUSE: 96 MS
QT - MUSE: 370 MS
QTC - MUSE: 462 MS
R AXIS - MUSE: -13 DEGREES
RBC # BLD AUTO: 2.97 10E6/UL (ref 4.4–5.9)
RBC # BLD AUTO: 5.29 10E6/UL (ref 4.4–5.9)
RBC MORPH BLD: ABNORMAL
RBC URINE: 2 /HPF
RBC URINE: 2 /HPF
RSV RNA SPEC NAA+PROBE: NEGATIVE
SA TARGET DNA: NEGATIVE
SARS-COV-2 RNA RESP QL NAA+PROBE: NEGATIVE
SODIUM SERPL-SCNC: 135 MMOL/L (ref 136–145)
SODIUM SERPL-SCNC: 138 MMOL/L (ref 136–145)
SP GR UR STRIP: 1.02 (ref 1–1.03)
SP GR UR STRIP: 1.02 (ref 1–1.03)
SYSTOLIC BLOOD PRESSURE - MUSE: NORMAL MMHG
T AXIS - MUSE: 26 DEGREES
UROBILINOGEN UR STRIP-MCNC: NORMAL MG/DL
UROBILINOGEN UR STRIP-MCNC: NORMAL MG/DL
VENTRICULAR RATE- MUSE: 94 BPM
WBC # BLD AUTO: 13.2 10E3/UL (ref 4–11)
WBC # BLD AUTO: 7.6 10E3/UL (ref 4–11)
WBC URINE: 1 /HPF
WBC URINE: 1 /HPF

## 2022-12-08 PROCEDURE — 81001 URINALYSIS AUTO W/SCOPE: CPT

## 2022-12-08 PROCEDURE — G0378 HOSPITAL OBSERVATION PER HR: HCPCS

## 2022-12-08 PROCEDURE — 71045 X-RAY EXAM CHEST 1 VIEW: CPT

## 2022-12-08 PROCEDURE — 87040 BLOOD CULTURE FOR BACTERIA: CPT | Performed by: HOSPITALIST

## 2022-12-08 PROCEDURE — 96361 HYDRATE IV INFUSION ADD-ON: CPT

## 2022-12-08 PROCEDURE — C9803 HOPD COVID-19 SPEC COLLECT: HCPCS | Performed by: EMERGENCY MEDICINE

## 2022-12-08 PROCEDURE — 87637 SARSCOV2&INF A&B&RSV AMP PRB: CPT | Performed by: PHYSICIAN ASSISTANT

## 2022-12-08 PROCEDURE — 258N000003 HC RX IP 258 OP 636: Performed by: EMERGENCY MEDICINE

## 2022-12-08 PROCEDURE — 96372 THER/PROPH/DIAG INJ SC/IM: CPT

## 2022-12-08 PROCEDURE — 74177 CT ABD & PELVIS W/CONTRAST: CPT | Mod: 26 | Performed by: RADIOLOGY

## 2022-12-08 PROCEDURE — 250N000013 HC RX MED GY IP 250 OP 250 PS 637

## 2022-12-08 PROCEDURE — 81001 URINALYSIS AUTO W/SCOPE: CPT | Performed by: EMERGENCY MEDICINE

## 2022-12-08 PROCEDURE — 258N000003 HC RX IP 258 OP 636: Performed by: PHYSICIAN ASSISTANT

## 2022-12-08 PROCEDURE — 74177 CT ABD & PELVIS W/CONTRAST: CPT

## 2022-12-08 PROCEDURE — 96374 THER/PROPH/DIAG INJ IV PUSH: CPT | Mod: XU | Performed by: EMERGENCY MEDICINE

## 2022-12-08 PROCEDURE — 250N000011 HC RX IP 250 OP 636

## 2022-12-08 PROCEDURE — 36415 COLL VENOUS BLD VENIPUNCTURE: CPT | Performed by: PHYSICIAN ASSISTANT

## 2022-12-08 PROCEDURE — 999N000197 HC STATISTIC WOC PT EDUCATION, 0-15 MIN

## 2022-12-08 PROCEDURE — 250N000012 HC RX MED GY IP 250 OP 636 PS 637

## 2022-12-08 PROCEDURE — 83605 ASSAY OF LACTIC ACID: CPT

## 2022-12-08 PROCEDURE — 250N000009 HC RX 250

## 2022-12-08 PROCEDURE — 85018 HEMOGLOBIN: CPT | Performed by: PHYSICIAN ASSISTANT

## 2022-12-08 PROCEDURE — 250N000011 HC RX IP 250 OP 636: Performed by: EMERGENCY MEDICINE

## 2022-12-08 PROCEDURE — 82010 KETONE BODYS QUAN: CPT | Performed by: PHYSICIAN ASSISTANT

## 2022-12-08 PROCEDURE — 96375 TX/PRO/DX INJ NEW DRUG ADDON: CPT

## 2022-12-08 PROCEDURE — 99214 OFFICE O/P EST MOD 30 MIN: CPT | Mod: GC | Performed by: INTERNAL MEDICINE

## 2022-12-08 PROCEDURE — 85025 COMPLETE CBC W/AUTO DIFF WBC: CPT | Performed by: PHYSICIAN ASSISTANT

## 2022-12-08 PROCEDURE — 258N000003 HC RX IP 258 OP 636

## 2022-12-08 PROCEDURE — 82962 GLUCOSE BLOOD TEST: CPT

## 2022-12-08 PROCEDURE — 87641 MR-STAPH DNA AMP PROBE: CPT

## 2022-12-08 PROCEDURE — 999N000157 HC STATISTIC RCP TIME EA 10 MIN

## 2022-12-08 PROCEDURE — 99221 1ST HOSP IP/OBS SF/LOW 40: CPT | Performed by: SURGERY

## 2022-12-08 PROCEDURE — 80053 COMPREHEN METABOLIC PANEL: CPT

## 2022-12-08 PROCEDURE — 85610 PROTHROMBIN TIME: CPT

## 2022-12-08 PROCEDURE — G0463 HOSPITAL OUTPT CLINIC VISIT: HCPCS

## 2022-12-08 PROCEDURE — 36415 COLL VENOUS BLD VENIPUNCTURE: CPT

## 2022-12-08 PROCEDURE — 99220 PR INITIAL OBSERVATION CARE,LEVEL III: CPT | Mod: AI | Performed by: STUDENT IN AN ORGANIZED HEALTH CARE EDUCATION/TRAINING PROGRAM

## 2022-12-08 PROCEDURE — 250N000011 HC RX IP 250 OP 636: Performed by: STUDENT IN AN ORGANIZED HEALTH CARE EDUCATION/TRAINING PROGRAM

## 2022-12-08 PROCEDURE — 250N000013 HC RX MED GY IP 250 OP 250 PS 637: Performed by: EMERGENCY MEDICINE

## 2022-12-08 PROCEDURE — 87899 AGENT NOS ASSAY W/OPTIC: CPT

## 2022-12-08 PROCEDURE — 96376 TX/PRO/DX INJ SAME DRUG ADON: CPT

## 2022-12-08 PROCEDURE — 250N000009 HC RX 250: Performed by: EMERGENCY MEDICINE

## 2022-12-08 PROCEDURE — 71045 X-RAY EXAM CHEST 1 VIEW: CPT | Mod: 26 | Performed by: RADIOLOGY

## 2022-12-08 RX ORDER — BISACODYL 10 MG
10 SUPPOSITORY, RECTAL RECTAL DAILY
Status: DISCONTINUED | OUTPATIENT
Start: 2022-12-08 | End: 2022-12-10 | Stop reason: HOSPADM

## 2022-12-08 RX ORDER — LIDOCAINE 40 MG/G
CREAM TOPICAL
Status: DISCONTINUED | OUTPATIENT
Start: 2022-12-08 | End: 2022-12-10 | Stop reason: HOSPADM

## 2022-12-08 RX ORDER — PANTOPRAZOLE SODIUM 20 MG/1
20 TABLET, DELAYED RELEASE ORAL
Status: DISCONTINUED | OUTPATIENT
Start: 2022-12-08 | End: 2022-12-10 | Stop reason: HOSPADM

## 2022-12-08 RX ORDER — PIPERACILLIN SODIUM, TAZOBACTAM SODIUM 3; .375 G/15ML; G/15ML
3.38 INJECTION, POWDER, LYOPHILIZED, FOR SOLUTION INTRAVENOUS EVERY 6 HOURS
Status: DISCONTINUED | OUTPATIENT
Start: 2022-12-08 | End: 2022-12-08

## 2022-12-08 RX ORDER — VANCOMYCIN HYDROCHLORIDE 1 G/200ML
1000 INJECTION, SOLUTION INTRAVENOUS EVERY 12 HOURS
Status: DISCONTINUED | OUTPATIENT
Start: 2022-12-08 | End: 2022-12-08

## 2022-12-08 RX ORDER — NICOTINE 21 MG/24HR
1 PATCH, TRANSDERMAL 24 HOURS TRANSDERMAL DAILY
Status: DISCONTINUED | OUTPATIENT
Start: 2022-12-08 | End: 2022-12-10 | Stop reason: HOSPADM

## 2022-12-08 RX ORDER — SODIUM CHLORIDE, SODIUM LACTATE, POTASSIUM CHLORIDE, CALCIUM CHLORIDE 600; 310; 30; 20 MG/100ML; MG/100ML; MG/100ML; MG/100ML
INJECTION, SOLUTION INTRAVENOUS CONTINUOUS
Status: DISCONTINUED | OUTPATIENT
Start: 2022-12-08 | End: 2022-12-08

## 2022-12-08 RX ORDER — DEXTROSE MONOHYDRATE 25 G/50ML
25-50 INJECTION, SOLUTION INTRAVENOUS
Status: DISCONTINUED | OUTPATIENT
Start: 2022-12-08 | End: 2022-12-10 | Stop reason: HOSPADM

## 2022-12-08 RX ORDER — PROCHLORPERAZINE 25 MG
25 SUPPOSITORY, RECTAL RECTAL EVERY 12 HOURS PRN
Status: DISCONTINUED | OUTPATIENT
Start: 2022-12-08 | End: 2022-12-10 | Stop reason: HOSPADM

## 2022-12-08 RX ORDER — SODIUM CHLORIDE, SODIUM LACTATE, POTASSIUM CHLORIDE, CALCIUM CHLORIDE 600; 310; 30; 20 MG/100ML; MG/100ML; MG/100ML; MG/100ML
INJECTION, SOLUTION INTRAVENOUS CONTINUOUS
Status: ACTIVE | OUTPATIENT
Start: 2022-12-08 | End: 2022-12-08

## 2022-12-08 RX ORDER — ONDANSETRON 4 MG/1
4 TABLET, ORALLY DISINTEGRATING ORAL EVERY 6 HOURS PRN
Status: DISCONTINUED | OUTPATIENT
Start: 2022-12-08 | End: 2022-12-10 | Stop reason: HOSPADM

## 2022-12-08 RX ORDER — NICOTINE POLACRILEX 4 MG
15-30 LOZENGE BUCCAL
Status: DISCONTINUED | OUTPATIENT
Start: 2022-12-08 | End: 2022-12-10 | Stop reason: HOSPADM

## 2022-12-08 RX ORDER — ONDANSETRON 2 MG/ML
4 INJECTION INTRAMUSCULAR; INTRAVENOUS EVERY 6 HOURS PRN
Status: DISCONTINUED | OUTPATIENT
Start: 2022-12-08 | End: 2022-12-10 | Stop reason: HOSPADM

## 2022-12-08 RX ORDER — MUPIROCIN 20 MG/G
OINTMENT TOPICAL 3 TIMES DAILY
Status: DISCONTINUED | OUTPATIENT
Start: 2022-12-08 | End: 2022-12-10 | Stop reason: HOSPADM

## 2022-12-08 RX ORDER — IOPAMIDOL 755 MG/ML
92 INJECTION, SOLUTION INTRAVASCULAR ONCE
Status: COMPLETED | OUTPATIENT
Start: 2022-12-08 | End: 2022-12-08

## 2022-12-08 RX ORDER — PROCHLORPERAZINE MALEATE 5 MG
10 TABLET ORAL EVERY 6 HOURS PRN
Status: DISCONTINUED | OUTPATIENT
Start: 2022-12-08 | End: 2022-12-10 | Stop reason: HOSPADM

## 2022-12-08 RX ORDER — HYDROMORPHONE HYDROCHLORIDE 1 MG/ML
0.5 INJECTION, SOLUTION INTRAMUSCULAR; INTRAVENOUS; SUBCUTANEOUS
Status: DISCONTINUED | OUTPATIENT
Start: 2022-12-08 | End: 2022-12-09

## 2022-12-08 RX ADMIN — HYDROMORPHONE HYDROCHLORIDE 0.5 MG: 1 INJECTION, SOLUTION INTRAMUSCULAR; INTRAVENOUS; SUBCUTANEOUS at 00:11

## 2022-12-08 RX ADMIN — SODIUM CHLORIDE, POTASSIUM CHLORIDE, SODIUM LACTATE AND CALCIUM CHLORIDE: 600; 310; 30; 20 INJECTION, SOLUTION INTRAVENOUS at 06:34

## 2022-12-08 RX ADMIN — INSULIN ASPART 3 UNITS: 100 INJECTION, SOLUTION INTRAVENOUS; SUBCUTANEOUS at 09:02

## 2022-12-08 RX ADMIN — INSULIN ASPART 1 UNITS: 100 INJECTION, SOLUTION INTRAVENOUS; SUBCUTANEOUS at 14:04

## 2022-12-08 RX ADMIN — PANTOPRAZOLE SODIUM 20 MG: 20 TABLET, DELAYED RELEASE ORAL at 17:25

## 2022-12-08 RX ADMIN — MUPIROCIN: 20 OINTMENT TOPICAL at 21:50

## 2022-12-08 RX ADMIN — SODIUM CHLORIDE, PRESERVATIVE FREE 91 ML: 5 INJECTION INTRAVENOUS at 02:13

## 2022-12-08 RX ADMIN — BICTEGRAVIR SODIUM, EMTRICITABINE, AND TENOFOVIR ALAFENAMIDE FUMARATE 1 TABLET: 50; 200; 25 TABLET ORAL at 09:02

## 2022-12-08 RX ADMIN — PIPERACILLIN AND TAZOBACTAM 3.38 G: 3; .375 INJECTION, POWDER, LYOPHILIZED, FOR SOLUTION INTRAVENOUS at 07:09

## 2022-12-08 RX ADMIN — INSULIN ASPART 3 UNITS: 100 INJECTION, SOLUTION INTRAVENOUS; SUBCUTANEOUS at 06:43

## 2022-12-08 RX ADMIN — HYDROMORPHONE HYDROCHLORIDE 0.5 MG: 1 INJECTION, SOLUTION INTRAMUSCULAR; INTRAVENOUS; SUBCUTANEOUS at 06:45

## 2022-12-08 RX ADMIN — SODIUM CHLORIDE 1000 ML: 9 INJECTION, SOLUTION INTRAVENOUS at 01:32

## 2022-12-08 RX ADMIN — SODIUM CHLORIDE, POTASSIUM CHLORIDE, SODIUM LACTATE AND CALCIUM CHLORIDE: 600; 310; 30; 20 INJECTION, SOLUTION INTRAVENOUS at 11:04

## 2022-12-08 RX ADMIN — IOPAMIDOL 92 ML: 755 INJECTION, SOLUTION INTRAVENOUS at 02:12

## 2022-12-08 RX ADMIN — LIDOCAINE HYDROCHLORIDE: 20 SOLUTION OROPHARYNGEAL at 01:39

## 2022-12-08 RX ADMIN — VANCOMYCIN HYDROCHLORIDE 1000 MG: 1 INJECTION, SOLUTION INTRAVENOUS at 08:06

## 2022-12-08 RX ADMIN — INSULIN DETEMIR 12 UNITS: 100 INJECTION, SOLUTION SUBCUTANEOUS at 09:03

## 2022-12-08 RX ADMIN — PANTOPRAZOLE SODIUM 20 MG: 20 TABLET, DELAYED RELEASE ORAL at 09:02

## 2022-12-08 RX ADMIN — SODIUM CHLORIDE, POTASSIUM CHLORIDE, SODIUM LACTATE AND CALCIUM CHLORIDE: 600; 310; 30; 20 INJECTION, SOLUTION INTRAVENOUS at 16:15

## 2022-12-08 ASSESSMENT — ACTIVITIES OF DAILY LIVING (ADL)
ADLS_ACUITY_SCORE: 37
ADLS_ACUITY_SCORE: 37
ADLS_ACUITY_SCORE: 35
ADLS_ACUITY_SCORE: 37
ADLS_ACUITY_SCORE: 35
ADLS_ACUITY_SCORE: 37
ADLS_ACUITY_SCORE: 35
ADLS_ACUITY_SCORE: 37
ADLS_ACUITY_SCORE: 37

## 2022-12-08 ASSESSMENT — ENCOUNTER SYMPTOMS
WEAKNESS: 0
NAUSEA: 0
DYSURIA: 0
DIARRHEA: 0
SHORTNESS OF BREATH: 0
BACK PAIN: 0
CONFUSION: 0
FEVER: 0
RHINORRHEA: 0
VOMITING: 0
BRUISES/BLEEDS EASILY: 0
ABDOMINAL PAIN: 1
CONSTIPATION: 0

## 2022-12-08 NOTE — UTILIZATION REVIEW
"Admission Status; Secondary Review Determination     Under the authority of the Utilization Management Committee, the utilization review process indicated a secondary review on the above patient. The review outcome is based on review of the medical records, discussions with staff, and applying clinical experience noted on the date of the review.     (x) Observation Status Appropriate - This patient does not meet hospital inpatient criteria and is placed in observation status. If this patient's primary payer is Medicare and was admitted as an inpatient, Condition Code 44 should be used and patient status changed to \"observation\".     RATIONALE FOR DETERMINATION:    55 year old male with a past medical history of HIV, GERD, uncontrolled DM 2 on metformin, HLD, R buttock abscess s/p I&D (11/18/22), hx of prior CNS toxoplasmosis, multiple bowel obstruction/partial bowel obstructions, GERD, constipation, DAVID, substance abuse and MDD who presents to the emergency department for evaluation abdominal pain.    Labs notable for leukocytosis (13) and hyperglycemia    CT imaging shows \"Stomach is distended with residual food material and fluid to the level of the gastroduodenal junction. No discrete lesion is identified. Findings may be attributable to inflammation or ulceration. Consider endoscopy for further assessment. Formed    stool material within normal caliber colon. A few loops of small bowel are mildly prominent in the right abdomen containing gas and fluid, transitioning to normal caliber further downstream, possibly representing evolving ileus or perhaps early mechanical obstruction. No free gas or free fluid.\"    General surgery has evaluated the patient.  No surgery recommended and they have signed off.    GI evaluated the patient.  No plans for EGD.      The patient received one dose of IV Dilaudid earlier today    The patient has been started on a clear liquid diet with ADAT    For now observation status appears " most appropriate.  If the patient has a setback with worsening clinical symptoms requiring reversion back to NPO and/or NGT placement, would then consider inpatient status.        The severity of illness, intensity of service provided, expected LOS and risk for adverse outcome make the care appropriate for further observation; however, doesn't meet criteria for hospital inpatient admission. KASSY Arabella Jovel notified of this determination via [employer] text message today.     The information on this document is developed by the utilization review team in order for the business office to ensure compliance. This only denotes the appropriateness of proper admission status and does not reflect the quality of care rendered.   The definitions of Inpatient Status and Observation Status used in making the determination above are those provided in the CMS Coverage Manual, Chapter 1 and Chapter 6, section 70.4.     Sincerely,     Cornelius Dean MD  Utilization Review   Physician Advisor   [facility]

## 2022-12-08 NOTE — H&P
[facility]    History and Physical - Hospitalist Service, GOLD TEAM        Date of Admission:  12/7/2022    Assessment & Plan      Andrew Lockwood is a 55 year old male with a past medical history of HIV, GERD, uncontrolled DM 2 on metformin, HLD, R buttock abscess s/p I&D (11/18/22), hx of prior CNS toxoplasmosis, multiple bowel obstruction/partial bowel obstructions, GERD, constipation, DAVID, substance abuse and MDD who presents to the emergency department for evaluation abdominal pain.    #Possible Partial SBO  #Possible Ileus  #Gastric Distension   History of recurrent partial SBO, patient states for around last 2-3 years. Most recent episode began in the last 1-2 days and discomfort began significantly increasing AM 12/07/2022. Passing gas. No nausea or vomiting at time of admission. Reports pain in left side of his abdomen is typical of his SBO. CT findings significant for possible illeus, possible evolving mechanical obstruction as well as gastric distension. Unlikely NG tube given no emesis. Consulting surgery regarding possible SBO and any additional management, GI for consideration of endoscopy.   - Consult to surgery  - Consult to GI  - Strict NPO  - Maintenance fluids at 125 ml/hour for 8 hours  - Hydromorphone 0.5 mg IV Q2H PRN     #Sepsis possibly due to abscess vs less likely CAP  #Abscess s/p drainage 11/2022  Patient developed new productive cough over last day. Has been swallowing phlegm, requested patient to give sample when able. As such he is uncertain of any concern for hemoptysis. Given tachycardia and leukocytosis with increased absolute neutrophils as well as immunocompromised status favor broad workup for infection and treatment with broad antibiotics. Also exam significant for scabbed buttock abscess and additional possible abscess between buttocks.   - vancomycin, pharmacy to dose  - zosyn, 4.5 grams Q6H   - Sputum culture   - MRSA swab   -  "mupirocin cream       #AGMA  Elevated anion gap to 18, in setting of DM II and  will assess for DKA with beta hydroxybutyrate. Lactic pending in context of sepsis.   - Beta hydroxybutyrate ordered    #HIV  - PTA biktarvy, reports good compliance       #DMII  Reports no insulin in almost 48 hours. 17 units PTA  - holding metformin  - Levemir giving 12 units (17 PTA) plus MDSSI  - 2 hours blood glucose checks, DKA workup as above   - A1c ordered     Diet: NPO for Medical/Clinical Reasons Except for: No Exceptions    DVT Prophylaxis: Pneumatic Compression Devices setting of possible procedure   Potts Catheter: Not present  Fluids: 125 ml/hour LR  Central Lines: None  Cardiac Monitoring: None  Code Status: Full Code      Clinically Significant Risk Factors Present on Admission           # Hypercalcemia: Highest Ca = 10.2 mg/dL (Ref range: 8.5 - 10.1 mg/dL) in last 2 days, will monitor as appropriate           # DMII: A1C = 12.8 % (Ref range: <5.7 %) within past 3 months    # Overweight: Estimated body mass index is 25.75 kg/m  as calculated from the following:    Height as of this encounter: 1.626 m (5' 4\").    Weight as of this encounter: 68 kg (150 lb).           Disposition Plan      Expected Discharge Date: 12/09/2022                The patient will be formally staffed in the AM.     Lam Malone MD  Hospitalist Service, [facility]  Securely message with the Vocera Web Console (learn more here)  Text page via [facility] Paging/Directory   Please see signed in provider for up to date coverage information    ______________________________________________________________________    Chief Complaint   Abdominal pain    History is obtained from the patient    History of Present Illness   Andrew Lockwood is a 55 year old male with a past medical history of HIV, GERD, uncontrolled DM 2 on metformin, HLD, R buttock abscess s/p I&D (11/18/22), hx of prior CNS " toxoplasmosis, multiple bowel obstruction/partial bowel obstructions, GERD, constipation, DAVID, substance abuse and MDD who presents to the emergency department for evaluation abdominal pain.    Patient presenting with chief concern of 2 days of abdominal pain that he reports is consistent with his previous small bowel obstructions.  He states that he first noticed symptoms on December 6, however there was an acute worsening of his symptoms December 7 which ultimately prompted his emergency department evaluation later in the day.  The primary symptom that he has is left-sided both upper and lower quadrant abdominal pain, he has not noticed any specific concerns of nausea or vomiting.  He states that he is still passing gas and that the pain control regimen which she has been given so far has been effective for him.  His most recent bowel movement was approximately 2 days ago, at that time he noticed a that his bowel movement was formed nonbloody and did not have anything concerning for hematochezia or melena.  Additionally he notes that he has continued to urinate normally.     Denies chest pain shortness of breath changes in strength and coordination changes in vision and hearing or new lower extremity edema.  Additionally he denies any concern of fevers chills or night sweats.     Review of Systems    The 10 point Review of Systems is negative other than noted in the HPI or here.     Past Medical History    I have reviewed this patient's medical history and updated it with pertinent information if needed.   Past Medical History:   Diagnosis Date     AIDS (H)      Allergic rhinitis due to other allergen     DNS     Anal dysplasia      Chronic abdominal pain      CNS toxoplasmosis (H)      Diabetes type 2, controlled (H)      GERD (gastroesophageal reflux disease)      History of seizure      History of substance abuse (H)      HIV (human immunodeficiency virus infection) (H)      HLD (hyperlipidemia)      Lung  nodules      Periungual wart      PTSD (post-traumatic stress disorder)      Sleep apnea     doesn't use CPAP        Past Surgical History   I have reviewed this patient's surgical history and updated it with pertinent information if needed.  Past Surgical History:   Procedure Laterality Date     ANOSCOPY N/A 9/9/2020    Procedure: Exam Under Anesthesia, ANOSCOPY, fulgeration of rectal fissures with Rectal Biopsies;  Surgeon: Thanh Lundberg MD;  Location: [facility] OR     COLONOSCOPY Left 1/22/2016    Procedure: COMBINED COLONOSCOPY, SINGLE OR MULTIPLE BIOPSY/POLYPECTOMY BY BIOPSY;  Surgeon: Clark Saini MD;  Location: [facility] GI     ESOPHAGOSCOPY, GASTROSCOPY, DUODENOSCOPY (EGD), COMBINED N/A 6/6/2022    Procedure: ESOPHAGOGASTRODUODENOSCOPY, WITH BIOPSY;  Surgeon: Kecia Benítez MD;  Location: [facility] GI     HC EXPLORE UNDESC TESTIS,INGUIN/SCROTAL       LAPAROSCOPIC APPENDECTOMY N/A 1/31/2018    Procedure: LAPAROSCOPIC APPENDECTOMY;  LAPAROSCOPIC APPENDECTOMY;  Surgeon: Dawn Holt MD;  Location: [facility] OR     LAPAROSCOPY DIAGNOSTIC (GENERAL) N/A 7/26/2016    Procedure: LAPAROSCOPY DIAGNOSTIC (GENERAL);  Surgeon: Susannah Arriaga MD;  Location: [facility] OR     LAPAROSCOPY DIAGNOSTIC (GENERAL) N/A 4/16/2018    Procedure: LAPAROSCOPY DIAGNOSTIC (GENERAL);  Diagnostic laparoscopy and lysis of adhesions;  Surgeon: Prince Dowling MD;  Location: [facility] OR     OPTICAL TRACKING SYSTEM CRANIOTOMY, EXCISE TUMOR, COMBINED Left 4/10/2015    Procedure: COMBINED OPTICAL TRACKING SYSTEM CRANIOTOMY, EXCISE TUMOR;  Surgeon: Mirlande Colmenares MD;  Location: [facility] OR     REPAIR GAMEKEEPER'S THUMB Right 12/2/2016    Procedure: REPAIR LIGAMENT ULNAR COLLATERAL THUMB (GAMEKEEPER'S);  Surgeon: Evin Zamorano MD;  Location:  OR     ZC NONSPECIFIC PROCEDURE      right forearm fracture        Social History   I have reviewed this patient's social history and updated it with pertinent information if needed. Andrew Lockwood   "reports that he has been smoking cigarettes. He has a 10.00 pack-year smoking history. He has quit using smokeless tobacco. He reports that he does not currently use drugs after having used the following drugs: Marijuana and Methamphetamines. He reports that he does not drink alcohol.    Family History   I have reviewed this patient's family history and updated it with pertinent information if needed.  Family History   Problem Relation Age of Onset     Diabetes Brother      Diabetes Father      Alzheimer Disease Father      Unknown/Adopted Mother      Diabetes Paternal Grandfather      Cancer No family hx of         no skin cancer     Skin Cancer No family hx of         no famiy hx of skin cancer     Glaucoma No family hx of      Macular Degeneration No family hx of        Prior to Admission Medications   Prior to Admission Medications   Prescriptions Last Dose Informant Patient Reported? Taking?   Alcohol Swabs PADS   No No   Si swab daily   Gauze Pads & Dressings (GAUZE PADS 4\"X4\") 4\"X4\" PADS   No No   Si each daily   bictegravir-emtricitabine-tenofovir (BIKTARVY) -25 MG per tablet   No No   Sig: Take 1 tablet by mouth daily   insulin detemir (LEVEMIR PEN) 100 UNIT/ML pen   No No   Sig: Inject 17 Units Subcutaneous every 24 hours   insulin pen needle (32G X 4 MM) 32G X 4 MM miscellaneous   No No   Sig: Use pen needles three times daily before meals and also before bed.   metFORMIN (GLUCOPHAGE XR) 500 MG 24 hr tablet   No No   Sig: Take 3 tablets (1,500 mg) by mouth daily (with dinner) With food   mupirocin (BACTROBAN) 2 % external ointment   No No   Sig: Apply topically 3 times daily   pantoprazole (PROTONIX) 20 MG EC tablet   No No   Sig: Take 1 tablet (20 mg) by mouth 2 times daily   sterile water, bottle, irrigation   No No   Sig: Irrigate with 1,000 mLs as directed daily      Facility-Administered Medications: None     Allergies   Allergies   Allergen Reactions     Fentanyl Blisters     Per pt, " "after taking this medication had blisters develope     Metformin      Abdominal pain     Tylenol [Acetaminophen] Itching     Dulaglutide Rash     Insulin Lispro Rash     Patient reported     Penicillin V Other (See Comments) and Rash     Diffuse maculopapular rash + feels \"high\", per pt.        Physical Exam   Vital Signs: Temp: 98.8  F (37.1  C) Temp src: Oral BP: (!) 138/92 Pulse: 102   Resp: 16 SpO2: 100 % O2 Device: None (Room air)    Weight: 150 lbs 0 oz    Constitutional: awake, alert, cooperative, appears stated age, fidgets on exam but not distressed   Eyes: Lids and lashes normal, pupils equal, round and reactive to light, extra ocular muscles intact, sclera clear, conjunctiva normal  ENT: Normocephalic, without obvious abnormality, atraumatic, sinuses nontender on palpation, external ears without lesions, oral pharynx with moist mucous membranes, tonsils without erythema or exudates, gums normal and good dentition.  Hematologic / Lymphatic: no cervical lymphadenopathy and no supraclavicular lymphadenopathy  Respiratory: No increased work of breathing, good air exchange, clear to auscultation bilaterally, no crackles or wheezing  Cardiovascular: Normal apical impulse, regular rate and rhythm, normal S1 and S2, no S3 or S4, and no murmur noted  GI: No scars, normal bowel sounds, soft, non-distended, non-tender, no masses palpated, no hepatosplenomegally  Skin: noted to have excoriations new and healed over several areas of skin. Buttock with drained scabbed abscess. Additional area of concern for possible abscess located closer to area between buttocks.   Musculoskeletal: There is no redness, warmth, or swelling of the joints.  Full range of motion noted.  Motor strength is 5 out of 5 all extremities bilaterally.  Tone is normal.  Neurologic: Awake, alert, oriented to name, place and time.  Cranial nerves II-XII are grossly intact.  Motor is 5 out of 5 bilaterally.     Data   Data reviewed today: I reviewed " all medications, new labs and imaging results over the last 24 hours.     Recent Labs   Lab 12/08/22 0606 12/07/22 2319   WBC  --  13.2*   HGB  --  16.6   MCV  --  91   PLT  --  404   NA  --  138   POTASSIUM  --  3.7   CHLORIDE  --  99   CO2  --  21*   BUN  --  18.3   CR  --  0.68   ANIONGAP  --  18*   LINDA  --  10.2*   * 318*   ALBUMIN  --  4.3   PROTTOTAL  --  7.7   BILITOTAL  --  0.9   ALKPHOS  --  119   ALT  --  19   AST  --  20   LIPASE  --  34     Most Recent 3 CBC's:Recent Labs   Lab Test 12/07/22 2319 11/18/22 0725 11/17/22 1730   WBC 13.2* 8.6 9.3   HGB 16.6 12.9* 15.0   MCV 91 90 91    277 344     Most Recent 3 BMP's:Recent Labs   Lab Test 12/08/22 0606 12/07/22 2319 11/19/22  1611 11/18/22  0747 11/18/22  0725 11/17/22  1734 11/17/22  1730   NA  --  138  --   --  137  --  129*   POTASSIUM  --  3.7  --   --  3.2*  --  4.2   CHLORIDE  --  99  --   --  103  --  92*   CO2  --  21*  --   --  23  --  26   BUN  --  18.3  --   --  16.0  --  16.1   CR  --  0.68  --   --  0.56*  --  0.70   ANIONGAP  --  18*  --   --  11  --  11   LINDA  --  10.2*  --   --  9.4  --  10.8*   * 318* 303*   < > 116*   < > 596*    < > = values in this interval not displayed.     Most Recent 2 LFT's:Recent Labs   Lab Test 12/07/22 2319 11/17/22  1730   AST 20 17   ALT 19 9*   ALKPHOS 119 161*   BILITOTAL 0.9 0.6     Recent Results (from the past 24 hour(s))   CT Abdomen Pelvis w Contrast    Narrative    EXAM: CT ABDOMEN PELVIS W CONTRAST  LOCATION: [facility]  DATE/TIME: 12/8/2022 2:25 AM    INDICATION: Abdominal pain.  COMPARISON: CT abdomen and pelvis with IV contrast 6/5/2022.  TECHNIQUE: CT scan of the abdomen and pelvis was performed following injection of IV contrast. Multiplanar reformats were obtained. Dose reduction techniques were used.  CONTRAST: 92 mL Isovue-370 IV.    FINDINGS:   LOWER CHEST: Small right middle lobe nodule seen previously is not included  in the field-of-study today. Slight dependent atelectasis in the lower lungs. No pleural effusion on either side. Normal cardiac size. No pericardial effusion.    HEPATOBILIARY: Focal fat in the usual location in the left hepatic lobe anteroinferiorly. Tiny hypodense cyst or hemangioma in the right hepatic lobe, too small to further characterize by CT, unchanged. Patent hepatic and portal veins.    PANCREAS: Normal.    SPLEEN: Normal.    ADRENAL GLANDS: Normal.    KIDNEYS/BLADDER: No urinary tract calculi. Both kidneys are well perfused without hydronephrosis or hydroureter. Considerably distended urinary bladder extending to the level of the umbilicus.    BOWEL: Stomach is distended with residual food material and fluid to the level of the gastroduodenal junction. No discrete lesion is identified. Findings may be attributable to inflammation or ulceration. Consider endoscopy for further assessment. Formed   stool material within normal caliber colon. A few loops of small bowel are mildly prominent in the right abdomen containing gas and fluid, transitioning to normal caliber further downstream, possibly representing evolving ileus or perhaps early   mechanical obstruction. No free gas or free fluid.    LYMPH NODES: No suspicious abdominopelvic adenopathy.    VASCULATURE: Slightly atherosclerotic normal caliber distal abdominal aorta measuring 1.6 x 1.6 cm (image 243, series 5). Normal caliber IVC.    PELVIC ORGANS: Considerably distended urinary bladder extending to the umbilicus. Prostatomegaly. Penile prosthesis with left lower pelvic reservoir. No adenopathy or free fluid.    MUSCULOSKELETAL: Anatomic alignment of the spine, bones and joints of the pelvis.      Impression    IMPRESSION:   1.  A few loops of small bowel are mildly prominent in the right lower abdomen, transitioning to normal caliber further downstream, raising the possibility of evolving ileus, or perhaps early mechanical obstruction. No free gas  or free fluid.    2.  The stomach is considerably distended with residual food material and fluid to the level of the gastroduodenal junction without a discrete lesion. Findings may be attributable to inflammation or ulceration. Consider endoscopy for further assessment.    3.  Tiny hypodense cyst or hemangioma in the right hepatic lobe, too small to further characterize by CT, unchanged. Focal fat in the usual location in the left hepatic lobe anteroinferiorly.    4.  Considerably distended urinary bladder extending to the umbilicus. Prostatomegaly. Penile prosthesis with left lower pelvic reservoir.   XR Chest Port 1 View    Narrative    EXAM: XR CHEST PORT 1 VIEW  LOCATION: [facility]  DATE/TIME: 12/8/2022 5:41 AM    INDICATION: Immunocompromised patient with abdominal pain and tachycardia. Evaluate for pneumonia.  COMPARISON: 09/08/2022      Impression    IMPRESSION: Negative chest.

## 2022-12-08 NOTE — PROVIDER NOTIFICATION
Provider notified (name): Kalpesh Hawkins  Reason for notification: Blood sugar 168 now. Discontinue q2h BG checks?     Response from provider: BG checks before meals order placed.

## 2022-12-08 NOTE — PROVIDER NOTIFICATION
Provider notified (name): Kalpesh Hawkins  Reason for notification: O2 sats >95% on room air since last night. No SOB reported. Discontinue continuous pulse ox order?    Response from provider:

## 2022-12-08 NOTE — ED TRIAGE NOTES
"PT states abdominal pain started yesterday and has been worsening. PT denies fever, Nausea, vomiting or diarrhea. PT states LBM was 2days ago and says \"I have a bowel obstruction because I have had them before and know what they feel like.\" PT breathing regular and unlabroed. Circulation warm and dry. PT states 10/10 pain in right side of abdomen.       "

## 2022-12-08 NOTE — PROGRESS NOTES
"[facility]    Medicine Progress Note - Hospitalist Service, GOLD TEAM 5    Date of Admission:  12/7/2022    Assessment & Plan   Andrew Lockwood is a 55 year old male with a past medical history of HIV, GERD, uncontrolled DM 2, HLD, R buttock abscess s/p I&D (11/18/22), prior CNS toxoplasmosis, multiple bowel obstruction/partial bowel obstructions, GERD, constipation, DAVID, substance abuse and MDD who presents to the emergency department for evaluation abdominal pain. He is now admitted to internal medicine for further monitoring and treatment.     #Abdominal Pain with c/f Partial SBO  #Gastric Distension  - Patient with history of recurrent partial SBO.  He endorses a 2-3 day history of worsening pain and abdominal distention. He is passing gas with no nausea or vomiting.  Last meal 3 days ago. CT A/P with \"A few loops of small bowel are mildly prominent in the right lower abdomen, transitioning to normal caliber further downstream, raising the possibility of evolving ileus, or perhaps early mechanical obstruction. No free gas or free fluid. The stomach is considerably distended with residual food material and fluid to the level of the gastroduodenal junction without a discrete lesion. Findings may be attributable to inflammation or ulceration.\" Concerns for possible SBO vs Ileus vs gastric outlet obstruction.   -General Surgery and Gastroenterology consulted, appreciate recs and assistance.   -No NG tube at this time per surgery  -Strict NPO  -mIVFs at 125 mL/hr  - Hydromorphone 0.5 mg IV Q2H PRN      #Sepsis of Unknown Etiology - Patient with leukocytosis and tachycardia on initial presentation. Patient notes he developed new cough upon arrival to ED. Per chart review, told admitting provider it was productive but denies sputum production during eval. Chest x-ray negative for infiltrate. Of note, patient with recent drainage of buttocks abscess in November 2022. Concerns " "from previous provider for recurrent abscess but area visualized w/o erythema or fluctuance. WBC 13.2, suspect an acute stress reactant to above abdominal pathology vs possible DKA as below.   -Continue antibiotics for now pending repeat CBC, will likely de-escalate   -Add COVID, Influenza A/B swab  -Follow blood cultures 12/8  - Sputum culture if patient has sputum production   - MRSA swab negative  - mupirocin cream      #Hyperglycemia d/t Missed Insulin   #DM2 Hgb A1c 12.8%  #AGMA  #Pseudohyponatremia - Anion gap 18, CO2 21 on arrival with blood sugars in the 300s in setting of missed insulin. Lactic acid WNLs. Concerns for possible DKA.   - Beta hydroxybutyrate pending   -continue mIVFs as above  -Continue current insulin regimen of sliding scale insulin Q6 and Levemir 12 units every morning.   -NPO as above     #HIV - Continue PTA biktarvy               Diet: NPO for Medical/Clinical Reasons Except for: No Exceptions    DVT Prophylaxis: Pneumatic Compression Devices  Potts Catheter: Not present  Central Lines: None  Cardiac Monitoring: None  Code Status: Full Code      Disposition Plan      Expected Discharge Date: 12/09/2022                The patient's care was discussed with the Attending Physician, Dr. Jay Tobin .    Arabella Jovel PA-C  Hospitalist Service, [address]  Securely message with the Vocera Web Console (learn more here)  Text page via [facility] Paging/Directory   Please see signed in provider for up to date coverage information      Clinically Significant Risk Factors Present on Admission           # Hypercalcemia: Highest Ca = 10.2 mg/dL (Ref range: 8.5 - 10.1 mg/dL) in last 2 days, will monitor as appropriate           # DMII: A1C = 12.8 % (Ref range: <5.7 %) within past 3 months    # Overweight: Estimated body mass index is 25.75 kg/m  as calculated from the following:    Height as of this encounter: 1.626 m (5' 4\").    Weight as of " "this encounter: 68 kg (150 lb).           ______________________________________________________________________    Interval History   All overnight events reviewed. Patient admits to some dull abdominal pain, continues to pass gas.  He is eager to have his diet resumed.  He denies associated nausea or vomiting.  When discussing his home diabetes regimen, he admits he sometimes doesn't take his insulin because it makes him \"feel icky.\" He denies any complaints to the area around his buttocks where the abscess was drained. He states his cough began when he arrived in the ED and it is not productive.   He denies fevers, chills, chest pain, dyspnea    Data reviewed today: I reviewed all medications, new labs and imaging results over the last 24 hours.    Physical Exam   Vital Signs: Temp: 98.2  F (36.8  C) Temp src: Oral BP: 125/85 Pulse: 91   Resp: 16 SpO2: 98 % O2 Device: None (Room air)    Weight: 150 lbs 0 oz  GENERAL: Alert and oriented x 3. NAD. Ambulatory. Cooperative.   HEENT: Anicteric sclera. Mucous membranes moist. NC. AT.   CV: RRR. S1, S2. No murmurs appreciated.   RESPIRATORY: Effort normal on RA. Lungs CTAB with no wheezing, rales, rhonchi.   GI/: Abdomen soft and non distended with normoactive bowel sounds present in all quadrants. Some TTP around umbilicus. Hernia present. Buttocks visualized with scabbed area with some underlying firmness.  No fluctuance, erythema or warmth. No purulence or drainage. Very small pea sized area of firmness w/o erythema, tenderness or drainage between gluteal folds directly above anus  NEUROLOGICAL: No focal deficits. Moves all extremities.  CN 2-12 grossly intact.  EXTREMITIES: No peripheral edema.  SKIN: No jaundice. No rashes.        Data     Latest Reference Range & Units 12/08/22 06:24   Sodium 136 - 145 mmol/L 135 (L)   Potassium 3.4 - 5.3 mmol/L 3.5   Chloride 98 - 107 mmol/L 105   Carbon Dioxide (CO2) 22 - 29 mmol/L 18 (L)   Urea Nitrogen 6.0 - 20.0 mg/dL 15.7 "   Creatinine 0.67 - 1.17 mg/dL 0.55 (L)   GFR Estimate >60 mL/min/1.73m2 >90   Calcium 8.6 - 10.0 mg/dL 8.4 (L)   Anion Gap 7 - 15 mmol/L 12   Albumin 3.5 - 5.2 g/dL 3.4 (L)   Protein Total 6.4 - 8.3 g/dL 6.6   Alkaline Phosphatase 40 - 129 U/L 105   ALT 10 - 50 U/L 10   AST 10 - 50 U/L 14   Bilirubin Total <=1.2 mg/dL 0.6   Glucose 70 - 99 mg/dL 239 (H)   Lactic Acid 0.7 - 2.0 mmol/L 1.0

## 2022-12-08 NOTE — PHARMACY-VANCOMYCIN DOSING SERVICE
Pharmacy Vancomycin Initial Note  Date of Service 2022  Patient's  1967  55 year old, male    Indication: Skin and Soft Tissue Infection    Current estimated CrCl = Estimated Creatinine Clearance: 115.3 mL/min (based on SCr of 0.68 mg/dL).    Creatinine for last 3 days  2022: 11:19 PM Creatinine 0.68 mg/dL    Recent Vancomycin Level(s) for last 3 days  No results found for requested labs within last 72 hours.      Vancomycin IV Administrations (past 72 hours)      No vancomycin orders with administrations in past 72 hours.                Nephrotoxins and other renal medications (From now, onward)    Start     Dose/Rate Route Frequency Ordered Stop    22 0700  vancomycin (VANCOCIN) 1000 mg in dextrose 5% 200 mL PREMIX         1,000 mg  200 mL/hr over 1 Hours Intravenous EVERY 12 HOURS 22 0641            Contrast Orders - past 72 hours (72h ago, onward)    Start     Dose/Rate Route Frequency Stop    22 0150  iopamidol (ISOVUE-370) solution 92 mL         92 mL Intravenous ONCE 22 0212          [employer] Prediction of Planned Initial Vancomycin Regimen  Loading dose: N/A  Regimen: 1000 mg IV every 12 hours.  Start time: 06:43 on 2022  Exposure target: AUC24 (range)400-600 mg/L.hr   AUC24,ss: 451 mg/L.hr  Probability of AUC24 > 400: 63 %  Ctrough,ss: 13.2 mg/L  Probability of Ctrough,ss > 20: 19 %  Probability of nephrotoxicity (Lodise CISCO ): 8 %          Plan:  1. Start vancomycin  1000 mg IV q12h.   2. Vancomycin monitoring method: AUC  3. Vancomycin therapeutic monitoring goal: 400-600 mg*h/L  4. Pharmacy will check vancomycin levels as appropriate in 1-3 Days.    5. Serum creatinine levels will be ordered daily for the first week of therapy and at least twice weekly for subsequent weeks.      Katherine Vick [facility]

## 2022-12-08 NOTE — CONSULTS
[facility] Surgery Consultation    Andrew Lockwood MRN# [id number]   Age: 55 year old YOB: 1967     Date of Admission:  12/7/2022    Date of Consult:   12/07/22    Reason for consult: Concern for partial SBO       Requesting service: Medicine; requesting provider: Dr. Malone                   Assessment and Plan:   Assessment:  55 year old male with PMHx of HIV, GERD, T2DM (last Hgb A1c 12.8), HLD, anxiety/depression, and hx of prior bowel obstructions 2/2 adhesions in setting of prior abdominal surgeries (lap appendectomy 2018, lap ANASTASIIA 2018, diagnostic lap 2016) who presented overnight with abdominal pain x1 day with concerns for partial small bowel obstruction. Of note, patient has a 3 year hx of similar pain and prior episodes with nausea and emesis.     Afebrile. Slightly tachycardic 100-110s, improving. Other vitals wnl. Labs significant for leucocytosis 13.2 and hyperglycemia 270-300s. CT with some mildly dilated small bowel loops distended stomach, which is not overtly concerning for small bowel obstruction but rather needs further evaluation for possible gastric obstruction.    Plan:  - Unclear etiology. Agree with GI consult to further evaluate for gastric outlet obstruction vs gastroparesis vs inflammatory/ulcer disease  - NPO pending GI evaluation  - IVFs, electrolyte repletion  - Minimize narcotics and anti-cholinergics  - EGS to sign off    Patient discussed with chief resident, Dr. Arredondo, and staff, Dr. Meza-Luly.    - - - - - - - - - - - - - - - - - -  Ronny Figueroa MD  [facility] General Surgery PGY-2  12/08/2022    See [facility] for on-call pager information: [facility] Paging/Directory - Surgery General /[facility]         Chief Complaint:     Abdominal pain         History of Present Illness:       55 year old male with PMHx of HIV, GERD, T2DM (last Hgb A1c 12.8), HLD, anxiety/depression, and hx of prior bowel obstructions 2/2 adhesions in setting of prior abdominal surgeries (lap  appendectomy 2018, lap ANASTASIIA 2018, diagnostic lap 2016) who presented overnight with abdominal pain with concerns for partial small bowel obstruction so EGS was consulted.     Patient reports that his abdominal pain began yesterday morning.  His pain is located in the left lower quadrant and is crampy in nature.  The pain is constant and nonradiating.  He endorses 10 out of 10 pain.  Denies nausea or emesis.  Endorses heartburn.  Last bowel movement was 3 days ago.  He usually has a bowel movement every day and does not take any stool softeners.    Denies chest pain, shortness of breath, hematemesis, diarrhea, melena, hematochezia, or changes to his urinary habits.    Of note, the patient has had similar episodes over the last 3 years.  He reports he gets a similar type of pain roughly every 2 weeks.  He last visited the ER about 3 weeks ago where he was given pain meds, GI cocktail which helped alleviate his pain.  During his prior episodes he endorses nausea and emesis.             Past Medical History:     Past Medical History:   Diagnosis Date     AIDS (H)      Allergic rhinitis due to other allergen     DNS     Anal dysplasia      Chronic abdominal pain      CNS toxoplasmosis (H)      Diabetes type 2, controlled (H)      GERD (gastroesophageal reflux disease)      History of seizure      History of substance abuse (H)      HIV (human immunodeficiency virus infection) (H)      HLD (hyperlipidemia)      Lung nodules      Periungual wart      PTSD (post-traumatic stress disorder)      Sleep apnea     doesn't use CPAP            Past Surgical History:     Past Surgical History:   Procedure Laterality Date     ANOSCOPY N/A 9/9/2020    Procedure: Exam Under Anesthesia, ANOSCOPY, fulgeration of rectal fissures with Rectal Biopsies;  Surgeon: Thanh Lundberg MD;  Location: [facility] OR     COLONOSCOPY Left 1/22/2016    Procedure: COMBINED COLONOSCOPY, SINGLE OR MULTIPLE BIOPSY/POLYPECTOMY BY BIOPSY;  Surgeon: Clark Saini  MD Jonathan;  Location: [facility] GI     ESOPHAGOSCOPY, GASTROSCOPY, DUODENOSCOPY (EGD), COMBINED N/A 6/6/2022    Procedure: ESOPHAGOGASTRODUODENOSCOPY, WITH BIOPSY;  Surgeon: Kecia Benítez MD;  Location: [facility] GI     HC EXPLORE UNDESC TESTIS,INGUIN/SCROTAL       LAPAROSCOPIC APPENDECTOMY N/A 1/31/2018    Procedure: LAPAROSCOPIC APPENDECTOMY;  LAPAROSCOPIC APPENDECTOMY;  Surgeon: Dawn Holt MD;  Location: [facility] OR     LAPAROSCOPY DIAGNOSTIC (GENERAL) N/A 7/26/2016    Procedure: LAPAROSCOPY DIAGNOSTIC (GENERAL);  Surgeon: Susannah Arriaga MD;  Location: [facility] OR     LAPAROSCOPY DIAGNOSTIC (GENERAL) N/A 4/16/2018    Procedure: LAPAROSCOPY DIAGNOSTIC (GENERAL);  Diagnostic laparoscopy and lysis of adhesions;  Surgeon: Prince Dowling MD;  Location: [facility] OR     OPTICAL TRACKING SYSTEM CRANIOTOMY, EXCISE TUMOR, COMBINED Left 4/10/2015    Procedure: COMBINED OPTICAL TRACKING SYSTEM CRANIOTOMY, EXCISE TUMOR;  Surgeon: Mirlande Colmenares MD;  Location: [facility] OR     REPAIR GAMEKEEPER'S THUMB Right 12/2/2016    Procedure: REPAIR LIGAMENT ULNAR COLLATERAL THUMB (GAMEKEEPER'S);  Surgeon: Evin Zamorano MD;  Location: [facility] OR     ZZC NONSPECIFIC PROCEDURE      right forearm fracture             Social History:     Social History     Tobacco Use     Smoking status: Some Days     Packs/day: 0.25     Years: 40.00     Pack years: 10.00     Types: Cigarettes     Smokeless tobacco: Former   Substance Use Topics     Alcohol use: No     Alcohol/week: 0.0 standard drinks     Comment: Last etoh in 2007       Smoking: ~3 cigarettes/day  EtOH: none  Meth use (last 2 weeks ago)          Family History:     Family History   Problem Relation Age of Onset     Diabetes Brother      Diabetes Father      Alzheimer Disease Father      Unknown/Adopted Mother      Diabetes Paternal Grandfather      Cancer No family hx of         no skin cancer     Skin Cancer No family hx of         no famiy hx of skin cancer     Glaucoma No family hx  "of      Macular Degeneration No family hx of                Allergies:     Allergies   Allergen Reactions     Fentanyl Blisters     Per pt, after taking this medication had blisters develope     Metformin      Abdominal pain     Tylenol [Acetaminophen] Itching     Dulaglutide Rash     Insulin Lispro Rash     Patient reported     Penicillin V Other (See Comments) and Rash     Diffuse maculopapular rash + feels \"high\", per pt.              Medications:     Current Facility-Administered Medications   Medication     bictegravir-emtricitabine-tenofovir (BIKTARVY) -25 MG per tablet 1 tablet     glucose gel 15-30 g    Or     dextrose 50 % injection 25-50 mL    Or     glucagon injection 1 mg     HYDROmorphone (PF) (DILAUDID) injection 0.5 mg     insulin aspart (NovoLOG) injection (RAPID ACTING)     insulin detemir (LEVEMIR PEN) injection 12 Units     lactated ringers infusion     lidocaine (LMX4) cream     lidocaine 1 % 0.1-1 mL     melatonin tablet 1 mg     mupirocin (BACTROBAN) 2 % ointment     nicotine (NICODERM CQ) 14 MG/24HR 24 hr patch 1 patch     nicotine Patch in Place     ondansetron (ZOFRAN ODT) ODT tab 4 mg    Or     ondansetron (ZOFRAN) injection 4 mg     pantoprazole (PROTONIX) EC tablet 20 mg     piperacillin-tazobactam (ZOSYN) 3.375 g vial to attach to  mL bag     prochlorperazine (COMPAZINE) injection 10 mg    Or     prochlorperazine (COMPAZINE) tablet 10 mg    Or     prochlorperazine (COMPAZINE) suppository 25 mg     sodium chloride (PF) 0.9% PF flush 3 mL     sodium chloride (PF) 0.9% PF flush 3 mL     vancomycin (VANCOCIN) 1000 mg in dextrose 5% 200 mL PREMIX     Current Outpatient Medications   Medication Sig     Alcohol Swabs PADS 1 swab daily     bictegravir-emtricitabine-tenofovir (BIKTARVY) -25 MG per tablet Take 1 tablet by mouth daily     Gauze Pads & Dressings (GAUZE PADS 4\"X4\") 4\"X4\" PADS 2 each daily     insulin detemir (LEVEMIR PEN) 100 UNIT/ML pen Inject 17 Units Subcutaneous " every 24 hours     insulin pen needle (32G X 4 MM) 32G X 4 MM miscellaneous Use pen needles three times daily before meals and also before bed.     metFORMIN (GLUCOPHAGE XR) 500 MG 24 hr tablet Take 3 tablets (1,500 mg) by mouth daily (with dinner) With food     mupirocin (BACTROBAN) 2 % external ointment Apply topically 3 times daily     pantoprazole (PROTONIX) 20 MG EC tablet Take 1 tablet (20 mg) by mouth 2 times daily     sterile water, bottle, irrigation Irrigate with 1,000 mLs as directed daily               Review of Systems:     Complete review of systems negative except as documented in HPI          Physical Exam:   All vitals have been reviewed  Temp:  [98.2  F (36.8  C)-98.8  F (37.1  C)] 98.2  F (36.8  C)  Pulse:  [] 91  Resp:  [16-20] 16  BP: (122-144)/(83-92) 125/85  SpO2:  [95 %-100 %] 98 %    Intake/Output Summary (Last 24 hours) at 12/8/2022 0711  Last data filed at 12/8/2022 0607  Gross per 24 hour   Intake --   Output 700 ml   Net -700 ml       Physical Exam:   General: Alert, in no acute distress.  Neuro: A&Ox3  HEENT: Normocephalic, atraumatic.  Neck: No cervical lymphadenopathy.   Respiratory: Non-labored breathing  Cardiovascular: Regular rate and rhythm. No tachycardia. Strong radial pulse  Gastrointestinal: Abdomen soft, non-distended. Small fat containing umbilical hernia which was mildly tender. Otherwise non-tender to palpation. No organomegaly or masses appreciated. No inguinal hernia.  Genitourinary: Deffered  MSK/Extremities: Moving all four extremities spontaneously. Warm, well perfused. No peripheral edema.   Incisions/Skin: As noted above. No jaundice, rashes, or lesions appreciated.              Data:   All laboratory data reviewed    Results:  BMP  Recent Labs   Lab 12/08/22  0606 12/07/22  2319   NA  --  138   POTASSIUM  --  3.7   CHLORIDE  --  99   CO2  --  21*   BUN  --  18.3   CR  --  0.68   * 318*     CBC  Recent Labs   Lab 12/07/22  2319   WBC 13.2*   HGB 16.6         LFT  Recent Labs   Lab 12/07/22  2319   AST 20   ALT 19   ALKPHOS 119   BILITOTAL 0.9   ALBUMIN 4.3     Recent Labs   Lab 12/08/22  0606 12/07/22  2319   * 318*           Imaging:  XR Chest Port 1 View    Result Date: 12/8/2022  EXAM: XR CHEST PORT 1 VIEW LOCATION: [facility] DATE/TIME: 12/8/2022 5:41 AM INDICATION: Immunocompromised patient with abdominal pain and tachycardia. Evaluate for pneumonia. COMPARISON: 09/08/2022     IMPRESSION: Negative chest.    CT Abdomen Pelvis w Contrast    Result Date: 12/8/2022  EXAM: CT ABDOMEN PELVIS W CONTRAST LOCATION: [facility] DATE/TIME: 12/8/2022 2:25 AM INDICATION: Abdominal pain. COMPARISON: CT abdomen and pelvis with IV contrast 6/5/2022. TECHNIQUE: CT scan of the abdomen and pelvis was performed following injection of IV contrast. Multiplanar reformats were obtained. Dose reduction techniques were used. CONTRAST: 92 mL Isovue-370 IV. FINDINGS: LOWER CHEST: Small right middle lobe nodule seen previously is not included in the field-of-study today. Slight dependent atelectasis in the lower lungs. No pleural effusion on either side. Normal cardiac size. No pericardial effusion. HEPATOBILIARY: Focal fat in the usual location in the left hepatic lobe anteroinferiorly. Tiny hypodense cyst or hemangioma in the right hepatic lobe, too small to further characterize by CT, unchanged. Patent hepatic and portal veins. PANCREAS: Normal. SPLEEN: Normal. ADRENAL GLANDS: Normal. KIDNEYS/BLADDER: No urinary tract calculi. Both kidneys are well perfused without hydronephrosis or hydroureter. Considerably distended urinary bladder extending to the level of the umbilicus. BOWEL: Stomach is distended with residual food material and fluid to the level of the gastroduodenal junction. No discrete lesion is identified. Findings may be attributable to inflammation or ulceration.  Consider endoscopy for further assessment. Formed  stool material within normal caliber colon. A few loops of small bowel are mildly prominent in the right abdomen containing gas and fluid, transitioning to normal caliber further downstream, possibly representing evolving ileus or perhaps early mechanical obstruction. No free gas or free fluid. LYMPH NODES: No suspicious abdominopelvic adenopathy. VASCULATURE: Slightly atherosclerotic normal caliber distal abdominal aorta measuring 1.6 x 1.6 cm (image 243, series 5). Normal caliber IVC. PELVIC ORGANS: Considerably distended urinary bladder extending to the umbilicus. Prostatomegaly. Penile prosthesis with left lower pelvic reservoir. No adenopathy or free fluid. MUSCULOSKELETAL: Anatomic alignment of the spine, bones and joints of the pelvis.     IMPRESSION: 1.  A few loops of small bowel are mildly prominent in the right lower abdomen, transitioning to normal caliber further downstream, raising the possibility of evolving ileus, or perhaps early mechanical obstruction. No free gas or free fluid. 2.  The stomach is considerably distended with residual food material and fluid to the level of the gastroduodenal junction without a discrete lesion. Findings may be attributable to inflammation or ulceration. Consider endoscopy for further assessment. 3.  Tiny hypodense cyst or hemangioma in the right hepatic lobe, too small to further characterize by CT, unchanged. Focal fat in the usual location in the left hepatic lobe anteroinferiorly. 4.  Considerably distended urinary bladder extending to the umbilicus. Prostatomegaly. Penile prosthesis with left lower pelvic reservoir.

## 2022-12-08 NOTE — CONSULTS
GASTROENTEROLOGY CONSULTATION      Date of Admission:  12/7/2022  Consulting Provider: Lam Malone MD              ASSESSMENT AND RECOMMENDATIONS:   Assessment:  Andrew Lockwood is a 55 year old male with a history of HIV c/b AIDSs now on HAART tc( CD4 365 9/2022), HTN. HLD, DMII (A1C 12.8 11/2022), Polysubstance Use, PTSD/MDD, recurrent  Partial SBO who presents to ED with complaints of abdominal Pain. GI consulted for possible GOO.       #Hx of LA Grade B Esophagitis  #Hx of Duodenitis   #Abnormal Imaging Findings with Residual Food/Liquid in Stomach   -Patient with e/o of residual food etc in stomach on CT A/P; unclear timing of imaging with respect to when food was ingested.   -Given hx of uncontrolled DMII, DDx includes gastroparesis or sequelae diffuse hypomotility in GI tract in absence of mechanical bowel obstruction. In absence of symptoms of n/v, low suspicion for underlying GOO.   -Patient with similar concerns of GOO in the setting of similar presentation. EGD in 06/2022 without evidence of GOO although it did show e.o duodenitis and esophagitis.      >>Patient reports adherence to PPI Tx     Recommendations:   -Optimize Bowel Regimen as able; consider Bisacodyl Suppositories vs. Gastrografin enema   -Avoid Motility slowing agents   -Continue with home PPI   -Continue with supportive measures.      >if develops nausea/vomiting, may benefit from being made NPO, NGT placement for decompression   -No indication for urgent endoscopic intervention at this time.     Gastroenterology follow up recommendations: Pending Clinical Course       Patient care plan discussed with Dr. Bahtti, GI staff physician. Thank you for involving us in this patient's care. Please do not hesitate to contact the GI service with any questions or concerns.       Tammi Loaiza M.D  GI fellow  Department of Gastroenterology     Attending Attestation:  I saw the patient with Dr. Loaiza and agree with the findings and the plan  of care as documented in the fellow's note. In summary, the patient is an 55 year old male with a history of HIV c/b AIDSs now on HAART tc( CD4 365 9/2022), HTN. HLD, DMII (A1C 12.8 11/2022), Polysubstance Use, PTSD/MDD, recurrent  Partial SBO who presents to ED with complaints of abdominal Pain. GI consulted for possible GOO.     Patient was seen sleeping comfortably in the hospital bed. He has previously undergone an upper endoscopy on 6/6/22 for suspected gastric outlet obstruction. His CT this admission suggests a possible evolving ileus. Keep NPO today and consider advancing diet tomorrow if he is passing gas/stool. Can consider enemas and suppositories as well as oral medications such as miralax tomorrow. He has significant stool burden seen on CT which can contribute to gastroparesis as can his uncontrolled diabetes (hgb a1c 12.8). No plan for endoscopic evaluation.      Overall time spent discussing, thinking, reviewing the chart including available imaging and labs, and evaluating the patient was 90 minutes of which greater than 50% of the time was spent on counseling and coordination of care.    Nico Bhatti DO   of Medicine  Director, Esophageal Disorders Program  Division of Gastroenterology, Hepatology, and Nutrition  [facility]      -------------------------------------------------------------------------------------------------------------------   History is obtained from the patient and the medical record.          History of Present Illness:   Andrew Lockwood is a 55 year old male with a history of HIV c/b AIDSs now on HAART tc( CD4 365 9/2022), HTN. HLD, DMII (A1C 12.8 11/2022), Polysubstance Use, PTSD/MDD, recurrent  Partial SBO who presents to ED with complaints of abdominal Pain. GI consulted for possible GOO.       Briefly, patient reports that he was at his baseline level of health until 1-day with RLQ abdominal pain similar to the pain he experienced in past in  setting of SBO. He denies any associated nausea or vomiting, but states that he was self-limiting his PO intake. Denies any dysuria. He denies nay dysphagia, odynophagia, or reflux symptoms. He denies any melena, hematemesis, hematochezia. He denies nay NSAID use and reports compliance on his prescribed PPI. He reports his last BM at ~3-days prior which he reports is normal for him. He states that he is passing gas.     On presentation to the ED, HDS. Initial labs significant for WBC 13.8, Lactate 1.0. CT A/P performed that revealed prominent loops of small bowel in the RL abdomen w/ subsequent transition to normal caliber bowel concerning for potential ileus or mechanical SBO; additionally, stomach noted to be considerably distended   With material suggestive of residual food and fluid.             Past Medical History:   Reviewed and edited as appropriate  Past Medical History:   Diagnosis Date     AIDS (H)      Allergic rhinitis due to other allergen     DNS     Anal dysplasia      Chronic abdominal pain      CNS toxoplasmosis (H)      Diabetes type 2, controlled (H)      GERD (gastroesophageal reflux disease)      History of seizure      History of substance abuse (H)      HIV (human immunodeficiency virus infection) (H)      HLD (hyperlipidemia)      Lung nodules      Periungual wart      PTSD (post-traumatic stress disorder)      Sleep apnea     doesn't use CPAP            Past Surgical History:   Reviewed and edited as appropriate   Past Surgical History:   Procedure Laterality Date     ANOSCOPY N/A 9/9/2020    Procedure: Exam Under Anesthesia, ANOSCOPY, fulgeration of rectal fissures with Rectal Biopsies;  Surgeon: Thanh Lundberg MD;  Location: [facility] OR     COLONOSCOPY Left 1/22/2016    Procedure: COMBINED COLONOSCOPY, SINGLE OR MULTIPLE BIOPSY/POLYPECTOMY BY BIOPSY;  Surgeon: Clark Saini MD;  Location: [facility] GI     ESOPHAGOSCOPY, GASTROSCOPY, DUODENOSCOPY (EGD), COMBINED N/A 6/6/2022    Procedure:  ESOPHAGOGASTRODUODENOSCOPY, WITH BIOPSY;  Surgeon: Kecia Benítez MD;  Location: [facility] GI     HC EXPLORE UNDESC TESTIS,INGUIN/SCROTAL       LAPAROSCOPIC APPENDECTOMY N/A 1/31/2018    Procedure: LAPAROSCOPIC APPENDECTOMY;  LAPAROSCOPIC APPENDECTOMY;  Surgeon: Dawn Holt MD;  Location: [facility]     LAPAROSCOPY DIAGNOSTIC (GENERAL) N/A 7/26/2016    Procedure: LAPAROSCOPY DIAGNOSTIC (GENERAL);  Surgeon: Susannah Arriaga MD;  Location: [facility]     LAPAROSCOPY DIAGNOSTIC (GENERAL) N/A 4/16/2018    Procedure: LAPAROSCOPY DIAGNOSTIC (GENERAL);  Diagnostic laparoscopy and lysis of adhesions;  Surgeon: Prince Dowling MD;  Location: [facility] OR     OPTICAL TRACKING SYSTEM CRANIOTOMY, EXCISE TUMOR, COMBINED Left 4/10/2015    Procedure: COMBINED OPTICAL TRACKING SYSTEM CRANIOTOMY, EXCISE TUMOR;  Surgeon: Mirlande Colmenares MD;  Location: [facility] OR     REPAIR GAMEKEEPER'S THUMB Right 12/2/2016    Procedure: REPAIR LIGAMENT ULNAR COLLATERAL THUMB (GAMEKEEPER'S);  Surgeon: Evin Zamorano MD;  Location: [facility] OR     ZZC NONSPECIFIC PROCEDURE      right forearm fracture            Previous Endoscopy:   No results found. However, due to the size of the patient record, not all encounters were searched. Please check Results Review for a complete set of results.         Social History:   Reviewed and edited as appropriate  Social History     Socioeconomic History     Marital status: Single     Spouse name: Not on file     Number of children: Not on file     Years of education: Not on file     Highest education level: Not on file   Occupational History     Occupation:      Comment: 5 years; temp agency     Occupation: Unemployed   Tobacco Use     Smoking status: Some Days     Packs/day: 0.25     Years: 40.00     Pack years: 10.00     Types: Cigarettes     Smokeless tobacco: Former   Substance and Sexual Activity     Alcohol use: No     Alcohol/week: 0.0 standard drinks     Comment: Last etoh in 2007      "Drug use: Not Currently     Types: Marijuana, Methamphetamines     Sexual activity: Not Currently     Partners: Female, Male     Comment: Last sexual activity 2008   Other Topics Concern     Parent/sibling w/ CABG, MI or angioplasty before 65F 55M? Not Asked   Social History Narrative    Born in [facility].  Came to the [place] in 1993.  Last traveled to visit family in 2008.        1/7/2019 - Homeless. Working with a  at [employer]  trying to get housing. Sexually active with two partners recently using condoms 100% of the time. Smokes occasionally, not for the last 4 weeks.        [facility] 1/1/[facility]. 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  Care established with ID and endocrine     Social Determinants of Health     Financial Resource Strain: Not on file   Food Insecurity: Not on file   Transportation Needs: Not on file   Physical Activity: Not on file   Stress: Not on file   Social Connections: Not on file   Intimate Partner Violence: Not on file   Housing Stability: Not on file            Family History:   Reviewed and edited as appropriate  Family History   Problem Relation Age of Onset     Diabetes Brother      Diabetes Father      Alzheimer Disease Father      Unknown/Adopted Mother      Diabetes Paternal Grandfather      Cancer No family hx of         no skin cancer     Skin Cancer No family hx of         no famiy hx of skin cancer     Glaucoma No family hx of      Macular Degeneration No family hx of            Allergies:   Reviewed and edited as appropriate     Allergies   Allergen Reactions     Fentanyl Blisters     Per pt, after taking this medication had blisters develope     Metformin      Abdominal pain     Tylenol [Acetaminophen] Itching     Dulaglutide Rash     Insulin Lispro Rash     Patient reported     Penicillin V Other (See Comments) and Rash     Diffuse maculopapular rash + feels \"high\", per pt.             Medications:     Current Facility-Administered Medications " "  Medication     bictegravir-emtricitabine-tenofovir (BIKTARVY) -25 MG per tablet 1 tablet     glucose gel 15-30 g    Or     dextrose 50 % injection 25-50 mL    Or     glucagon injection 1 mg     HYDROmorphone (PF) (DILAUDID) injection 0.5 mg     insulin aspart (NovoLOG) injection (RAPID ACTING)     insulin detemir (LEVEMIR PEN) injection 12 Units     lactated ringers infusion     lidocaine (LMX4) cream     lidocaine 1 % 0.1-1 mL     melatonin tablet 1 mg     mupirocin (BACTROBAN) 2 % ointment     nicotine (NICODERM CQ) 14 MG/24HR 24 hr patch 1 patch     nicotine Patch in Place     ondansetron (ZOFRAN ODT) ODT tab 4 mg    Or     ondansetron (ZOFRAN) injection 4 mg     pantoprazole (PROTONIX) EC tablet 20 mg     piperacillin-tazobactam (ZOSYN) 3.375 g vial to attach to  mL bag     prochlorperazine (COMPAZINE) injection 10 mg    Or     prochlorperazine (COMPAZINE) tablet 10 mg    Or     prochlorperazine (COMPAZINE) suppository 25 mg     sodium chloride (PF) 0.9% PF flush 3 mL     sodium chloride (PF) 0.9% PF flush 3 mL     vancomycin (VANCOCIN) 1000 mg in dextrose 5% 200 mL PREMIX     Current Outpatient Medications   Medication Sig     Alcohol Swabs PADS 1 swab daily     bictegravir-emtricitabine-tenofovir (BIKTARVY) -25 MG per tablet Take 1 tablet by mouth daily     Gauze Pads & Dressings (GAUZE PADS 4\"X4\") 4\"X4\" PADS 2 each daily     insulin detemir (LEVEMIR PEN) 100 UNIT/ML pen Inject 17 Units Subcutaneous every 24 hours     insulin pen needle (32G X 4 MM) 32G X 4 MM miscellaneous Use pen needles three times daily before meals and also before bed.     metFORMIN (GLUCOPHAGE XR) 500 MG 24 hr tablet Take 3 tablets (1,500 mg) by mouth daily (with dinner) With food     mupirocin (BACTROBAN) 2 % external ointment Apply topically 3 times daily     pantoprazole (PROTONIX) 20 MG EC tablet Take 1 tablet (20 mg) by mouth 2 times daily     sterile water, bottle, irrigation Irrigate with 1,000 mLs as directed " "daily             Review of Systems:     A complete review of systems was performed and is negative except as noted in the HPI           Physical Exam:   /85   Pulse 91   Temp 98.2  F (36.8  C) (Oral)   Resp 16   Ht 1.626 m (5' 4\")   Wt 68 kg (150 lb)   SpO2 98%   BMI 25.75 kg/m    Wt:   Wt Readings from Last 2 Encounters:   12/07/22 68 kg (150 lb)   11/18/22 67.1 kg (148 lb 0.2 oz)      Constitutional: cooperative, pleasant, not dyspneic/diaphoretic, no acute distress  Eyes: Sclera anicteric/injected  Ears/nose/mouth/throat: Normal oropharynx without ulcers or exudate, mucus membranes moist, hearing intact  Neck: supple, thyroid normal size  CV: No edema  Respiratory: Unlabored breathing  Abd:  Nondistended, +bs, no hepatosplenomegaly, + ttp in RLQ and R. Mid abdomen.   Skin: warm, perfused, no jaundice  Neuro: AAO  Psych: Normal affect         Data:   Labs and imaging below were independently reviewed and interpreted    BMP  Recent Labs   Lab 12/08/22 0624 12/08/22 0606 12/07/22 2319   *  --  138   POTASSIUM 3.5  --  3.7   CHLORIDE 105  --  99   LINDA 8.4*  --  10.2*   CO2 18*  --  21*   BUN 15.7  --  18.3   CR 0.55*  --  0.68   * 277* 318*     CBC  Recent Labs   Lab 12/07/22 2319   WBC 13.2*   RBC 5.29   HGB 16.6   HCT 48.3   MCV 91   MCH 31.4   MCHC 34.4   RDW 12.8        INR  Recent Labs   Lab 12/08/22 0624   INR 1.07     LFTs  Recent Labs   Lab 12/08/22 0624 12/07/22 2319   ALKPHOS 105 119   AST 14 20   ALT 10 19   BILITOTAL 0.6 0.9   PROTTOTAL 6.6 7.7   ALBUMIN 3.4* 4.3      PANC  Recent Labs   Lab 12/07/22  2319   LIPASE 34     "

## 2022-12-08 NOTE — PROGRESS NOTES
"Shift: 2715-0829    VS: /85   Pulse 91   Temp 98.2  F (36.8  C) (Oral)   Resp 16   Ht 1.626 m (5' 4\")   Wt 68 kg (150 lb)   SpO2 98%   BMI 25.75 kg/m    Pain: pt c/o abdominal pain, give PRN dilaudid with relieved.   Neuro: A/Ox4  Cardiac: WDL  Respiratory: O2 sats above 95% on RA  Diet/Appetite: Strict NPO   /GI: Pt has a urine output of 700mL  LDA's: Left forearm PIV   Skin: Dry and intact  Activity: Stand by assist  Pertinent labs: Am labs     Plan: Pain management   "

## 2022-12-08 NOTE — ED PROVIDER NOTES
[place] EMERGENCY DEPARTMENT ([facility])  12/07/22  ED Provider Note  [facility]      History     Chief Complaint   Patient presents with     Abdominal Pain     HPI  Andrew Lockwood is a 55 year old male with a past medical history of HIV, GERD, uncontrolled DM 2 on metformin, HLD, R buttock abscess s/p I&D (11/18/22), hx of prior CNS toxoplasmosis, multiple bowel obstruction/partial bowel obstructions, GERD, constipation, DAVID, substance abuse and MDD who presents to the emergency department for evaluation abdominal pain.  Patient complains of abdominal pain that is located in his left lower abdomen that started earlier this morning (Wednesday morning).  Patient describes the pain as a sharp cramping pain that has been constant throughout the day.  No radiation of the pain, no aggravating, no relieving factors.  Patient denies any fever, chills, nausea, vomiting, chest pain, shortness of breath, dysuria, hematuria, diarrhea, constipation.  Patient reports last bowel movement was 2 days ago.  Patient reports history of bowel obstructions in the past.  Patient states that he did not come in earlier because it was cold and he was sleeping.  Patient denies any current substance use. No other complaints.           Past Medical History  Past Medical History:   Diagnosis Date     AIDS (H)      Allergic rhinitis due to other allergen     DNS     Anal dysplasia      Chronic abdominal pain      CNS toxoplasmosis (H)      Diabetes type 2, controlled (H)      GERD (gastroesophageal reflux disease)      History of seizure      History of substance abuse (H)      HIV (human immunodeficiency virus infection) (H)      HLD (hyperlipidemia)      Lung nodules      Periungual wart      PTSD (post-traumatic stress disorder)      Sleep apnea     doesn't use CPAP     Past Surgical History:   Procedure Laterality Date     ANOSCOPY N/A 9/9/2020    Procedure: Exam Under Anesthesia, ANOSCOPY,  "fulgeration of rectal fissures with Rectal Biopsies;  Surgeon: Thanh Lundberg MD;  Location: [facility] OR     COLONOSCOPY Left 1/22/2016    Procedure: COMBINED COLONOSCOPY, SINGLE OR MULTIPLE BIOPSY/POLYPECTOMY BY BIOPSY;  Surgeon: Clark Saini MD;  Location: [facility] GI     ESOPHAGOSCOPY, GASTROSCOPY, DUODENOSCOPY (EGD), COMBINED N/A 6/6/2022    Procedure: ESOPHAGOGASTRODUODENOSCOPY, WITH BIOPSY;  Surgeon: Kecia Benítez MD;  Location: [facility] GI     HC EXPLORE UNDESC TESTIS,INGUIN/SCROTAL       LAPAROSCOPIC APPENDECTOMY N/A 1/31/2018    Procedure: LAPAROSCOPIC APPENDECTOMY;  LAPAROSCOPIC APPENDECTOMY;  Surgeon: Dawn Holt MD;  Location: [facility]     LAPAROSCOPY DIAGNOSTIC (GENERAL) N/A 7/26/2016    Procedure: LAPAROSCOPY DIAGNOSTIC (GENERAL);  Surgeon: Susannah Arriaga MD;  Location: [facility]     LAPAROSCOPY DIAGNOSTIC (GENERAL) N/A 4/16/2018    Procedure: LAPAROSCOPY DIAGNOSTIC (GENERAL);  Diagnostic laparoscopy and lysis of adhesions;  Surgeon: Prince Dowling MD;  Location: [facility] OR     OPTICAL TRACKING SYSTEM CRANIOTOMY, EXCISE TUMOR, COMBINED Left 4/10/2015    Procedure: COMBINED OPTICAL TRACKING SYSTEM CRANIOTOMY, EXCISE TUMOR;  Surgeon: Mirlande Colmenares MD;  Location: [facility] OR     REPAIR GAMEKEEPER'S THUMB Right 12/2/2016    Procedure: REPAIR LIGAMENT ULNAR COLLATERAL THUMB (GAMEKEEPER'S);  Surgeon: Evin Zamorano MD;  [facility] OR     ZZC NONSPECIFIC PROCEDURE      right forearm fracture     Alcohol Swabs PADS  bictegravir-emtricitabine-tenofovir (BIKTARVY) -25 MG per tablet  Gauze Pads & Dressings (GAUZE PADS 4\"X4\") 4\"X4\" PADS  insulin detemir (LEVEMIR PEN) 100 UNIT/ML pen  insulin pen needle (32G X 4 MM) 32G X 4 MM miscellaneous  metFORMIN (GLUCOPHAGE XR) 500 MG 24 hr tablet  mupirocin (BACTROBAN) 2 % external ointment  pantoprazole (PROTONIX) 20 MG EC tablet  sterile water, bottle, irrigation      Allergies   Allergen Reactions     Fentanyl Blisters     Per pt, after taking " "this medication had blisters develope     Metformin      Abdominal pain     Tylenol [Acetaminophen] Itching     Dulaglutide Rash     Insulin Lispro Rash     Patient reported     Penicillin V Other (See Comments) and Rash     Diffuse maculopapular rash + feels \"high\", per pt.      Family History  Family History   Problem Relation Age of Onset     Diabetes Brother      Diabetes Father      Alzheimer Disease Father      Unknown/Adopted Mother      Diabetes Paternal Grandfather      Cancer No family hx of         no skin cancer     Skin Cancer No family hx of         no famiy hx of skin cancer     Glaucoma No family hx of      Macular Degeneration No family hx of      Social History   Social History     Tobacco Use     Smoking status: Some Days     Packs/day: 0.25     Years: 40.00     Pack years: 10.00     Types: Cigarettes     Smokeless tobacco: Former   Substance Use Topics     Alcohol use: No     Alcohol/week: 0.0 standard drinks     Comment: Last etoh in 2007     Drug use: Not Currently     Types: Marijuana, Methamphetamines      Past medical history, past surgical history, medications, allergies, family history, and social history were reviewed with the patient. No additional pertinent items.       Review of Systems   Constitutional: Negative for fever.   HENT: Negative for rhinorrhea.    Eyes: Negative for visual disturbance.   Respiratory: Negative for shortness of breath.    Cardiovascular: Negative for chest pain.   Gastrointestinal: Positive for abdominal pain. Negative for constipation, diarrhea, nausea and vomiting.   Endocrine: Negative for polyuria.   Genitourinary: Negative for dysuria.   Musculoskeletal: Negative for back pain.   Skin: Negative for rash.   Allergic/Immunologic: Positive for immunocompromised state.   Neurological: Negative for weakness.   Hematological: Does not bruise/bleed easily.   Psychiatric/Behavioral: Negative for confusion.     A complete review of systems was performed with " "pertinent positives and negatives noted in the HPI, and all other systems negative.    Physical Exam   BP: 122/83  Pulse: 114  Temp: 98.8  F (37.1  C)  Resp: 18  Height: 162.6 cm (5' 4\")  Weight: 68 kg (150 lb)  SpO2: 95 %  Physical Exam  General: Afebrile, no acute distress   HEENT: Normocephalic, atraumatic, conjunctivae normal. MMM  Neck: non-tender, supple  Cardio: regular rate. regular rhythm   Resp: Normal work of breathing, no respiratory distress, lungs clear bilaterally, no wheezing, rhonchi, rales  Chest/Back: no visual signs of trauma, no CVA tenderness   Abdomen: soft, mild distension, mild diffuse TTP, no rebound, no guarding, no peritoneal signs   Neuro: alert and fully oriented. CN II-XII grossly intact. Grossly normal strength and sensation in all extremities.   MSK: no deformities. Normal range of motion  Integumentary/Skin: no rash visualized, normal color  Psych: normal affect, normal behavior    ED Course      Procedures    Results for orders placed or performed during the hospital encounter of 12/07/22   CT Abdomen Pelvis w Contrast     Status: None    Narrative    EXAM: CT ABDOMEN PELVIS W CONTRAST  LOCATION: [facility]  DATE/TIME: 12/8/2022 2:25 AM    INDICATION: Abdominal pain.  COMPARISON: CT abdomen and pelvis with IV contrast 6/5/2022.  TECHNIQUE: CT scan of the abdomen and pelvis was performed following injection of IV contrast. Multiplanar reformats were obtained. Dose reduction techniques were used.  CONTRAST: 92 mL Isovue-370 IV.    FINDINGS:   LOWER CHEST: Small right middle lobe nodule seen previously is not included in the field-of-study today. Slight dependent atelectasis in the lower lungs. No pleural effusion on either side. Normal cardiac size. No pericardial effusion.    HEPATOBILIARY: Focal fat in the usual location in the left hepatic lobe anteroinferiorly. Tiny hypodense cyst or hemangioma in the right hepatic lobe, too small to " further characterize by CT, unchanged. Patent hepatic and portal veins.    PANCREAS: Normal.    SPLEEN: Normal.    ADRENAL GLANDS: Normal.    KIDNEYS/BLADDER: No urinary tract calculi. Both kidneys are well perfused without hydronephrosis or hydroureter. Considerably distended urinary bladder extending to the level of the umbilicus.    BOWEL: Stomach is distended with residual food material and fluid to the level of the gastroduodenal junction. No discrete lesion is identified. Findings may be attributable to inflammation or ulceration. Consider endoscopy for further assessment. Formed   stool material within normal caliber colon. A few loops of small bowel are mildly prominent in the right abdomen containing gas and fluid, transitioning to normal caliber further downstream, possibly representing evolving ileus or perhaps early   mechanical obstruction. No free gas or free fluid.    LYMPH NODES: No suspicious abdominopelvic adenopathy.    VASCULATURE: Slightly atherosclerotic normal caliber distal abdominal aorta measuring 1.6 x 1.6 cm (image 243, series 5). Normal caliber IVC.    PELVIC ORGANS: Considerably distended urinary bladder extending to the umbilicus. Prostatomegaly. Penile prosthesis with left lower pelvic reservoir. No adenopathy or free fluid.    MUSCULOSKELETAL: Anatomic alignment of the spine, bones and joints of the pelvis.      Impression    IMPRESSION:   1.  A few loops of small bowel are mildly prominent in the right lower abdomen, transitioning to normal caliber further downstream, raising the possibility of evolving ileus, or perhaps early mechanical obstruction. No free gas or free fluid.    2.  The stomach is considerably distended with residual food material and fluid to the level of the gastroduodenal junction without a discrete lesion. Findings may be attributable to inflammation or ulceration. Consider endoscopy for further assessment.    3.  Tiny hypodense cyst or hemangioma in the right  hepatic lobe, too small to further characterize by CT, unchanged. Focal fat in the usual location in the left hepatic lobe anteroinferiorly.    4.  Considerably distended urinary bladder extending to the umbilicus. Prostatomegaly. Penile prosthesis with left lower pelvic reservoir.   [place] Draw     Status: None    Narrative    The following orders were created for panel order [place] Draw.  Procedure                               Abnormality         Status                     ---------                               -----------         ------                     Extra Red Top Tube[[id number]]                               Final result               Extra Green Top (Lithium...[[id number]]                      Final result               Extra Purple Top Tube[[id number]]                            Final result                 Please view results for these tests on the individual orders.   Extra Red Top Tube     Status: None   Result Value Ref Range    Hold Specimen JIC    Extra Green Top (Lithium Heparin) Tube     Status: None   Result Value Ref Range    Hold Specimen JIC    Extra Purple Top Tube     Status: None   Result Value Ref Range    Hold Specimen JIC    Comprehensive metabolic panel     Status: Abnormal   Result Value Ref Range    Sodium 138 136 - 145 mmol/L    Potassium 3.7 3.4 - 5.3 mmol/L    Chloride 99 98 - 107 mmol/L    Carbon Dioxide (CO2) 21 (L) 22 - 29 mmol/L    Anion Gap 18 (H) 7 - 15 mmol/L    Urea Nitrogen 18.3 6.0 - 20.0 mg/dL    Creatinine 0.68 0.67 - 1.17 mg/dL    Calcium 10.2 (H) 8.6 - 10.0 mg/dL    Glucose 318 (H) 70 - 99 mg/dL    Alkaline Phosphatase 119 40 - 129 U/L    AST 20 10 - 50 U/L    ALT 19 10 - 50 U/L    Protein Total 7.7 6.4 - 8.3 g/dL    Albumin 4.3 3.5 - 5.2 g/dL    Bilirubin Total 0.9 <=1.2 mg/dL    GFR Estimate >90 >60 mL/min/1.73m2   Lipase     Status: Normal   Result Value Ref Range    Lipase 34 13 - 60 U/L   UA with Microscopic reflex to Culture     Status: Abnormal    Specimen:  Urine, Midstream   Result Value Ref Range    Color Urine Light Yellow Colorless, Straw, Light Yellow, Yellow    Appearance Urine Clear Clear    Glucose Urine >=1000 (A) Negative mg/dL    Bilirubin Urine Negative Negative    Ketones Urine 60 (A) Negative mg/dL    Specific Gravity Urine 1.020 1.003 - 1.035    Blood Urine Negative Negative    pH Urine 5.5 5.0 - 7.0    Protein Albumin Urine 10 (A) Negative mg/dL    Urobilinogen Urine Normal Normal, 2.0 mg/dL    Nitrite Urine Negative Negative    Leukocyte Esterase Urine Negative Negative    RBC Urine 2 <=2 /HPF    WBC Urine 1 <=5 /HPF    Narrative    Urine Culture not indicated   CBC with platelets and differential     Status: Abnormal   Result Value Ref Range    WBC Count 13.2 (H) 4.0 - 11.0 10e3/uL    RBC Count 5.29 4.40 - 5.90 10e6/uL    Hemoglobin 16.6 13.3 - 17.7 g/dL    Hematocrit 48.3 40.0 - 53.0 %    MCV 91 78 - 100 fL    MCH 31.4 26.5 - 33.0 pg    MCHC 34.4 31.5 - 36.5 g/dL    RDW 12.8 10.0 - 15.0 %    Platelet Count 404 150 - 450 10e3/uL    % Neutrophils 74 %    % Lymphocytes 18 %    % Monocytes 6 %    % Eosinophils 1 %    % Basophils 0 %    % Immature Granulocytes 1 %    NRBCs per 100 WBC 0 <1 /100    Absolute Neutrophils 9.9 (H) 1.6 - 8.3 10e3/uL    Absolute Lymphocytes 2.4 0.8 - 5.3 10e3/uL    Absolute Monocytes 0.8 0.0 - 1.3 10e3/uL    Absolute Eosinophils 0.1 0.0 - 0.7 10e3/uL    Absolute Basophils 0.0 0.0 - 0.2 10e3/uL    Absolute Immature Granulocytes 0.1 <=0.4 10e3/uL    Absolute NRBCs 0.0 10e3/uL   CBC with platelets differential     Status: Abnormal    Narrative    The following orders were created for panel order CBC with platelets differential.  Procedure                               Abnormality         Status                     ---------                               -----------         ------                     CBC with platelets and d...[[id number]]  Abnormal            Final result                 Please view results for these tests on the  individual orders.     Medications   HYDROmorphone (PF) (DILAUDID) injection 0.5 mg (0.5 mg Intravenous Given 12/8/22 0011)   0.9% sodium chloride BOLUS (0 mLs Intravenous Stopped 12/8/22 0020)   0.9% sodium chloride BOLUS (1,000 mLs Intravenous New Bag 12/8/22 0132)   lidocaine (viscous) (XYLOCAINE) 2 % 15 mL, alum & mag hydroxide-simethicone (MAALOX) 15 mL GI Cocktail ( Oral Given 12/8/22 0139)   iopamidol (ISOVUE-370) solution 92 mL (92 mLs Intravenous Given 12/8/22 0212)   sodium chloride 0.9 % bag 500mL for CT scan flush use (91 mLs Intravenous Given 12/8/22 0213)        Assessments & Plan (with Medical Decision Making)   Andrew Lockwood is a 55 year old male with a past medical history of HIV, GERD, uncontrolled DM 2 on metformin, HLD, R buttock abscess s/p I&D (11/18/22), hx of prior CNS toxoplasmosis, multiple bowel obstruction/partial bowel obstructions, GERD, constipation, DAVID, substance abuse and MDD who presents to the emergency department for evaluation abdominal pain. Upon arrival patient is nontoxic-appearing, afebrile, no distress.  Patient mildly tachycardic with heart rate of 114 bpm, blood pressure 122/83, afebrile, oxygen 95% on room air.  Abdomen is soft, mild distension, there is some diffuse tenderness to palpation with no rebound, no guarding, no peritoneal signs.  Differential diagnosis includes but is not limited to gastritis versus gastroenteritis versus peptic ulcer disease versus pancreatitis versus cholecystitis versus ileus versus obstruction versus constipation versus cystitis among others.  Upon arrival an IV was established, patient was given IV Dilaudid for pain along with 1 L normal saline IV fluid bolus.  Comprehensive labs were drawn.    I reviewed comprehensive labs which are remarkable for white blood cell count of 13.2, hemoglobin 16.6, anion gap elevated 18, no acute electrolyte abnormality, glucose 318, no transaminitis, lipase 34. On re-evaluation patient resting more  comfortably after IV pain medication however still with ongoing abdominal discomfort, discussed laboratory testing with patient, given ongoing pain, leukocytosis, will proceed with CT imaging at this time.  Patient was given an additional liter normal saline IV fluid bolus for total of 2 L.    I reviewed CT scan which demonstrates a few loops of small bowel mildly prominent in the right lower abdomen transitioning to normal caliber further downstream, raising the possibility of evolving ileus or perhaps early mechanical obstruction.  Given patient's history of obstruction, ongoing abdominal pain, will plan on admission for further evaluation treatment of abdominal pain, possible ileus versus early obstruction along with hyperglycemia and glucosuria.  Discussed with internal medicine hospitalist who understands agrees with plan.  Patient understands agrees with plan.    I have reviewed the nursing notes. I have reviewed the findings, diagnosis, plan and need for follow up with the patient.    New Prescriptions    No medications on file       Final diagnoses:   Abdominal pain, generalized   Hyperglycemia       --  Sheila Bruno MD  [facility] EMERGENCY DEPARTMENT  12/7/2022     Sheila Bruno MD  12/08/22 0344

## 2022-12-08 NOTE — CONSULTS
"WOC consulted for buttock wound   S/p Abscess I&D on 11/18  Pt refusing assessment today, \"I need to sleep, come back tomm\"   I:  Discussed with bedside RN.  Pt has been refusing other nursing assessments.    P:  Bedside nurse will place mepilex dressing if wound open  WOC visit tomm   "

## 2022-12-08 NOTE — ED NOTES
PT stating he cannot breathe and taking big gasping breaths. PT o2 100% and vitals stable. When walking past room PT is breathing regular and unlabored. Charge aware of PT disposition

## 2022-12-09 LAB
ANION GAP SERPL CALCULATED.3IONS-SCNC: 11 MMOL/L (ref 7–15)
BUN SERPL-MCNC: 7.5 MG/DL (ref 6–20)
CALCIUM SERPL-MCNC: 8.6 MG/DL (ref 8.6–10)
CHLORIDE SERPL-SCNC: 105 MMOL/L (ref 98–107)
CREAT SERPL-MCNC: 0.55 MG/DL (ref 0.67–1.17)
CREAT SERPL-MCNC: 0.59 MG/DL (ref 0.67–1.17)
DEPRECATED HCO3 PLAS-SCNC: 22 MMOL/L (ref 22–29)
ERYTHROCYTE [DISTWIDTH] IN BLOOD BY AUTOMATED COUNT: 12.6 % (ref 10–15)
GFR SERPL CREATININE-BSD FRML MDRD: >90 ML/MIN/1.73M2
GFR SERPL CREATININE-BSD FRML MDRD: >90 ML/MIN/1.73M2
GLUCOSE BLDC GLUCOMTR-MCNC: 189 MG/DL (ref 70–99)
GLUCOSE BLDC GLUCOMTR-MCNC: 206 MG/DL (ref 70–99)
GLUCOSE BLDC GLUCOMTR-MCNC: 206 MG/DL (ref 70–99)
GLUCOSE BLDC GLUCOMTR-MCNC: 326 MG/DL (ref 70–99)
GLUCOSE SERPL-MCNC: 154 MG/DL (ref 70–99)
HCT VFR BLD AUTO: 37.7 % (ref 40–53)
HGB BLD-MCNC: 12.7 G/DL (ref 13.3–17.7)
MCH RBC QN AUTO: 30.7 PG (ref 26.5–33)
MCHC RBC AUTO-ENTMCNC: 33.7 G/DL (ref 31.5–36.5)
MCV RBC AUTO: 91 FL (ref 78–100)
PLATELET # BLD AUTO: 285 10E3/UL (ref 150–450)
POTASSIUM SERPL-SCNC: 3.4 MMOL/L (ref 3.4–5.3)
RBC # BLD AUTO: 4.14 10E6/UL (ref 4.4–5.9)
SODIUM SERPL-SCNC: 138 MMOL/L (ref 136–145)
WBC # BLD AUTO: 6.8 10E3/UL (ref 4–11)

## 2022-12-09 PROCEDURE — 82962 GLUCOSE BLOOD TEST: CPT

## 2022-12-09 PROCEDURE — 99213 OFFICE O/P EST LOW 20 MIN: CPT | Mod: GC | Performed by: INTERNAL MEDICINE

## 2022-12-09 PROCEDURE — 36415 COLL VENOUS BLD VENIPUNCTURE: CPT | Performed by: PHYSICIAN ASSISTANT

## 2022-12-09 PROCEDURE — 96372 THER/PROPH/DIAG INJ SC/IM: CPT

## 2022-12-09 PROCEDURE — 999N000197 HC STATISTIC WOC PT EDUCATION, 0-15 MIN

## 2022-12-09 PROCEDURE — 99207 PR APP CREDIT; MD BILLING SHARED VISIT: CPT | Performed by: PHYSICIAN ASSISTANT

## 2022-12-09 PROCEDURE — 250N000011 HC RX IP 250 OP 636: Performed by: PHYSICIAN ASSISTANT

## 2022-12-09 PROCEDURE — G0378 HOSPITAL OBSERVATION PER HR: HCPCS

## 2022-12-09 PROCEDURE — 99225 PR SUBSEQUENT OBSERVATION CARE,LEVEL II: CPT | Mod: FS | Performed by: STUDENT IN AN ORGANIZED HEALTH CARE EDUCATION/TRAINING PROGRAM

## 2022-12-09 PROCEDURE — 82565 ASSAY OF CREATININE: CPT | Performed by: PHYSICIAN ASSISTANT

## 2022-12-09 PROCEDURE — 85027 COMPLETE CBC AUTOMATED: CPT | Performed by: PHYSICIAN ASSISTANT

## 2022-12-09 PROCEDURE — 250N000013 HC RX MED GY IP 250 OP 250 PS 637

## 2022-12-09 PROCEDURE — 250N000013 HC RX MED GY IP 250 OP 250 PS 637: Performed by: PHYSICIAN ASSISTANT

## 2022-12-09 PROCEDURE — 80048 BASIC METABOLIC PNL TOTAL CA: CPT | Performed by: PHYSICIAN ASSISTANT

## 2022-12-09 PROCEDURE — 96361 HYDRATE IV INFUSION ADD-ON: CPT

## 2022-12-09 RX ORDER — METOCLOPRAMIDE 10 MG/1
10 TABLET ORAL
Status: DISCONTINUED | OUTPATIENT
Start: 2022-12-09 | End: 2022-12-10 | Stop reason: HOSPADM

## 2022-12-09 RX ORDER — ENOXAPARIN SODIUM 100 MG/ML
30 INJECTION SUBCUTANEOUS EVERY 24 HOURS
Status: DISCONTINUED | OUTPATIENT
Start: 2022-12-09 | End: 2022-12-09

## 2022-12-09 RX ORDER — METFORMIN HYDROCHLORIDE 750 MG/1
1500 TABLET, EXTENDED RELEASE ORAL
Status: DISCONTINUED | OUTPATIENT
Start: 2022-12-09 | End: 2022-12-10 | Stop reason: HOSPADM

## 2022-12-09 RX ORDER — ENOXAPARIN SODIUM 100 MG/ML
40 INJECTION SUBCUTANEOUS EVERY 24 HOURS
Status: DISCONTINUED | OUTPATIENT
Start: 2022-12-09 | End: 2022-12-10 | Stop reason: HOSPADM

## 2022-12-09 RX ORDER — AMOXICILLIN 250 MG
2 CAPSULE ORAL 2 TIMES DAILY
Status: DISCONTINUED | OUTPATIENT
Start: 2022-12-09 | End: 2022-12-10 | Stop reason: HOSPADM

## 2022-12-09 RX ORDER — ACETAMINOPHEN 325 MG/1
650 TABLET ORAL EVERY 4 HOURS PRN
Status: DISCONTINUED | OUTPATIENT
Start: 2022-12-09 | End: 2022-12-10 | Stop reason: HOSPADM

## 2022-12-09 RX ADMIN — ENOXAPARIN SODIUM 40 MG: 40 INJECTION SUBCUTANEOUS at 17:11

## 2022-12-09 RX ADMIN — INSULIN DETEMIR 12 UNITS: 100 INJECTION, SOLUTION SUBCUTANEOUS at 09:04

## 2022-12-09 RX ADMIN — BICTEGRAVIR SODIUM, EMTRICITABINE, AND TENOFOVIR ALAFENAMIDE FUMARATE 1 TABLET: 50; 200; 25 TABLET ORAL at 12:39

## 2022-12-09 RX ADMIN — METOCLOPRAMIDE 10 MG: 10 TABLET ORAL at 17:09

## 2022-12-09 RX ADMIN — METOCLOPRAMIDE 10 MG: 10 TABLET ORAL at 22:11

## 2022-12-09 RX ADMIN — METFORMIN HYDROCHLORIDE 1500 MG: 750 TABLET, EXTENDED RELEASE ORAL at 12:32

## 2022-12-09 RX ADMIN — PANTOPRAZOLE SODIUM 20 MG: 20 TABLET, DELAYED RELEASE ORAL at 17:09

## 2022-12-09 ASSESSMENT — ACTIVITIES OF DAILY LIVING (ADL)
ADLS_ACUITY_SCORE: 35
DEPENDENT_IADLS:: INDEPENDENT
ADLS_ACUITY_SCORE: 35

## 2022-12-09 NOTE — CONSULTS
"Care Management Initial Consult    General Information  Assessment completed with: Patient, VM-chart review,    Primary Care Provider verified and updated as needed:   yes  Readmission within the last 30 days: previous discharge plan unsuccessful   Reason for Consult: discharge planning  Advance Care Planning Reviewed: no concerns identified        Communication Assessment  Patient's communication style: spoken language (English or Bilingual)    Hearing Difficulty or Deaf: no      Cognitive  Cognitive/Neuro/Behavioral: WDL    Level of Consciousness: alert, other (see comments) (arousable; \"sleepy\")  Arousal Level: arouses to voice,   Orientation: oriented x 4    Mood/Behavior: cooperative       Speech: clear    Living Environment:   People in home: alone     Current living Arrangements: shelter, homeless      Able to return to prior arrangements: yes     Family/Social Support:  Care provided by: self  Provides care for: no one  Description of Support System: limited to [facility] services     Current Resources:   Patient receiving home care services: No  Community Resources: [facility] Programs, [employer] Worker  Equipment currently used at home: none  Supplies currently used at home: None    Employment/Financial:  Employment Status: unemployed     Financial Concerns: No concerns identified      Lifestyle & Psychosocial Needs:  Social Determinants of Health     Tobacco Use: High Risk     Smoking Tobacco Use: Some Days     Smokeless Tobacco Use: Former     Passive Exposure: Not on file   Alcohol Use: Not on file   Financial Resource Strain: Not on file   Food Insecurity: Not on file   Transportation Needs: Not on file   Physical Activity: Not on file   Stress: Not on file   Social Connections: Not on file   Intimate Partner Violence: Not on file   Depression: Not at risk     PHQ-2 Score: 0   Housing Stability: Not on file     Functional Status:  Prior to admission patient needed assistance:   Dependent ADLs:: " Independent  Dependent IADLs:: Independent     Mental Health Status:  Mental Health Status: No Current Concerns (denies)       Chemical Dependency Status:  Chemical Dependency Status: No Current Concerns (denies)           Values/Beliefs:  Spiritual, Cultural Beliefs, Zoroastrianism Practices, Values that affect care: no       Additional Information:  55 year old male with a past medical history of HIV, GERD, uncontrolled DM 2, HLD, R buttock abscess s/p I&D (11/18/22), prior CNS toxoplasmosis, multiple bowel obstruction/partial bowel obstructions, GERD, constipation, DAVID, substance abuse and MDD who presents to the emergency department for evaluation abdominal pain. He is now admitted to internal medicine for further monitoring and treatment.     RNCC met with pt at the bedside, pt sleeping but arouses to voice; Pt noted to be cleared for discharge by rounding Gold 6 provider. Pt confirmed listed PCP services at [facility]. Pt confirms listed address is [facility] ([phone] [facility]; or [phone]), unsuccessful contact(s), VM full. 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 RNCC/SW will need to arrange transportationPlus or offer public transportation tokens (noted from prior admission). After CMA, pt requesting solid foods; diet changed to regular, pt subsequently advised to call for room service; Pt not able to tolerate regular diet, discharge plans deferred for further work-up/evaluation. RNCC/SW team to continue to follow pt and support a safe discharge plan.    Warner Salazar RN BSN    5B RNCC  Phone: [phone]  Pager: [phone]    For Weekend & Holiday on call RN Care  Coordinator:  (Tasks: Home care, home infusion, medical equipment, transportation, IMM & [place] forms, etc.)  [place] (0800 - 1630) Saturday and Sunday    Units: 4A, 4C, 4E, 5A and 5B: [phone]    Units: 6A, 6B, 6C, 6D: [phone]    Units: 7A, 7B, 7C, 7D, and 5C: [phone]    [facility] ([phone]) Saturday and Sunday    Units: 5 Ortho, 8A, 10 ICU, & Pediatric Units: [phone]

## 2022-12-09 NOTE — PROGRESS NOTES
OBSERVATION GOALS:  - Tolerating PO intake w/o abdominal pain: NOT MET, pt experienced abd pain after eating reg diet at 1030-MD aware.

## 2022-12-09 NOTE — PROGRESS NOTES
GASTROENTEROLOGY PROGRESS NOTE    Date: 12/09/2022     ASSESSMENT:  Andrew Lockwood is a 55 year old male with a history of HIV c/b AIDSs now on HAART tc( CD4 365 9/2022), HTN. HLD, DMII (A1C 12.8 11/2022), Polysubstance Use, PTSD/MDD, recurrent  Partial SBO who presents to ED with complaints of abdominal Pain. GI consulted for possible GOO.         #Hx of LA Grade B Esophagitis  #Hx of Duodenitis   #Abnormal Imaging Findings with Residual Food/Liquid in Stomach   #Hx of Chronic Constipation   #Hx of Remote Penile Implant w/ Pelvic Casmalia   #Distended Bladder   #Prostamegaly   -Patient with e/o of residual food etc in stomach on CT A/P; unclear timing of imaging with respect to when food was ingested.   -Given hx of uncontrolled DMII, DDx includes gastroparesis or sequelae diffuse hypomotility in GI tract in absence of mechanical bowel obstruction. In absence of symptoms of n/v, low suspicion for underlying GOO.   -Patient with similar concerns of GOO in the setting of similar presentation. EGD in 06/2022 without evidence of GOO although it did show e.o duodenitis and esophagitis.      >>Patient reports adherence to PPI Tx   -Additionally, patient with chronic constipation with noted stool burden on CT in addition to significant bladder distension in setting of prostamegaly and penile implant pelvic reservoir. Radiographic imaging suggests that a component of these factors are likely contributing to patient's presenting symptoms as well.       Recommendations:   -Optimize Bowel Regimen as able; consider Bisacodyl Suppositories vs. Gastrografin enema   -Avoid Motility slowing agents as able   -Consider Urology consult   -No indication for antibiotics from a GI perspective   -Continue with home PPI   -Continue with supportive measures. Ok to advance diet as tolerated   -No indication for urgent endoscopic intervention at this time.           Patient care plan discussed with Dr. Cavazos,  GI staff physician. Thank  "you for involving us in this patient's care. GI-Luminal Service will sign off at this time.  Please do not hesitate to contact the GI service with any questions or concerns.        Tammi Loaiza MD   Gastroenterology Fellow  Division of Gastroenterology, Hepatology and Nutrition  [facility]    _______________________________________________________________      Subjective:  NAEON. Patient seen at [facility] this AM, NAD. Reports that he is feeling better overall, states that he is hungry. Had a charted BM overnight, but does not confirm so with me.     Objective:  Blood pressure 100/68, pulse 76, temperature 97.4  F (36.3  C), temperature source Oral, resp. rate 18, height 1.626 m (5' 4\"), weight 66.6 kg (146 lb 13.2 oz), SpO2 96 %.    Gen: A&O  HEENT: ncat, perrl, eomi, sclera anicteric  CV: RRR  Lungs: CTA b/l  Abd:  +bs, soft, nd/nt  Skin: no jaundice, no stigmata of chronic liver disease  MS: appropriate muscle mass for age        LABS:  [facility]  Recent Labs   Lab 12/09/22  0813 12/09/22  0305 12/08/22  2153 12/08/22  1323 12/08/22  0850 12/08/22  0624 12/08/22  0606 12/07/22  2319     --   --   --   --  135*  --  138   POTASSIUM 3.4  --   --   --   --  3.5  --  3.7   CHLORIDE 105  --   --   --   --  105  --  99   LINDA 8.6  --   --   --   --  8.4*  --  10.2*   CO2 22  --   --   --   --  18*  --  21*   BUN 7.5  --   --   --   --  15.7  --  18.3   CR 0.59*  --   --   --   --  0.55*  --  0.68   * 206* 300* 168*   < > 239*   < > 318*    < > = values in this interval not displayed.     CBC  Recent Labs   Lab 12/09/22  0813 12/08/22  1126 12/08/22  1045 12/07/22  2319   WBC 6.8  --  7.6 13.2*   RBC 4.14*  --  2.97* 5.29   HGB 12.7*   < > 9.1* 16.6   HCT 37.7*  --  28.1* 48.3   MCV 91  --  95 91   MCH 30.7  --  30.6 31.4   MCHC 33.7  --  32.4 34.4   RDW 12.6  --  12.6 12.8     --  187 404    < > = values in this interval not displayed.     INR  Recent Labs   Lab 12/08/22  0624   INR 1.07 "     LFTs  Recent Labs   Lab 12/08/22  0624 12/07/22  2319   ALKPHOS 105 119   AST 14 20   ALT 10 19   BILITOTAL 0.6 0.9   PROTTOTAL 6.6 7.7   ALBUMIN 3.4* 4.3      PANC  Recent Labs   Lab 12/07/22  2319   LIPASE 34

## 2022-12-09 NOTE — PLAN OF CARE
RN assumed cares at [id number]     Vitals: Refused vitals   Pain: denied abdominal pain at 0600 - writer assessed pain at 2000 and pt ranked as 5/10 - pain decreased overnight.   Neuro: A&Ox4.  Cardiac: WDL - no vitals taked overnight d/t pt refusal  Respiratory: Lung sounds clear. Stable on room air. No respiratory distress overnight.  GI/: Voiding adequately. 1x BM overnight - pt reporting passing flatus.   IV/Drains: PIV saline locked.  Skin: refused 2 RN skin assessment   Activity: up independently in room.   Nutrition: Clear liquid diet overnight. Advance diet for breakfast to and monitor for N/V - passed to 12/9 day RN.  Labs:  (correction doses given), 206     Events/plan: Pt denied pain and nausea. Had one BM overnight. Tolerating clear diet well. Advance to Full liquid diet for breakfast and monitor pt to see if tolerating.       Goal Outcome Evaluation:      Plan of Care Reviewed With: patient    Overall Patient Progress: improvingOverall Patient Progress: improving    Outcome Evaluation: Tolerating clear diet, eage to advance diet. Denied nausea overnight. Denied abdominal pain.

## 2022-12-09 NOTE — CONSULTS
"[facility]  WO Nurse Inpatient Assessment     Consulted for: buttock    Patient History (according to provider note(s):      Andrew Lockwood is a 55 year old male with a past medical history of HIV, GERD, uncontrolled DM 2, HLD, R buttock abscess s/p I&D (11/18/22), prior CNS toxoplasmosis, multiple bowel obstruction/partial bowel obstructions, GERD, constipation, DAVID, substance abuse and MDD who presents to the emergency department for evaluation abdominal pain. He is now admitted to internal medicine for further monitoring and treatment.     Areas Assessed:      Areas visualized during today's visit: Patient refused     Per chart review pt had an abscess on 11/17 with I&D 11/18. Appears that pt missed follow up appointment 12/2. Pt moving in bed without difficulty and is insistent that no assessment is needed.   Pt stated \"there is nothing wrong with my butt\"    Treatment Plan:     Buttock wound: Mepilex if draining     Orders: Written    RECOMMEND PRIMARY TEAM ORDER: None, at this time  Education provided: plan of care  Discussed plan of care with: Patient and Nurse  WO nurse follow-up plan: signing off  Notify WOC if wound(s) deteriorate.  Nursing to notify the Provider(s) and re-consult the WOC Nurse if new skin concern.    DATA:     Current support surface: Standard  Standard gel/foam mattress (IsoFlex, Atmos air, etc)  Containment of urine/stool: Continent of bladder and Continent of bowel  BMI: Body mass index is 25.2 kg/m .   Active diet order: Orders Placed This Encounter      Advance Diet as Tolerated: Full Liquid Diet     Output: I/O last 3 completed shifts:  In: 120 [P.O.:120]  Out: 1250 [Urine:1250]     Labs: Recent Labs   Lab 12/09/22  0813 12/08/22  1045 12/08/22  0624 12/07/22  8009   ALBUMIN  --   --  3.4* 4.3   HGB 12.7*   < >  --  16.6   INR  --   --  1.07  --    WBC 6.8   < >  --  13.2*   A1C  --   --   --  12.8*    < > = values in this interval not " displayed.     Pressure injury risk assessment:   Sensory Perception: 4-->no impairment  Moisture: 4-->rarely moist  Activity: 3-->walks occasionally  Mobility: 4-->no limitation  Nutrition: 3-->adequate  Friction and Shear: 3-->no apparent problem  Estevan Score: 21    Toyin Correa RN CWOCN   Dept. Pager: [phone]  Dept. Office Number: [phone]

## 2022-12-09 NOTE — PROGRESS NOTES
Pt refused cpap stating they don't want to wear it and don't use one at home. Discontinued order.     Leo Bravo, RT on 12/8/2022 at 11:44 PM

## 2022-12-09 NOTE — PLAN OF CARE
"Cares 1400 to 1900    /68 (BP Location: Right arm)   Pulse 86   Temp 98.1  F (36.7  C) (Oral)   Resp 16   Ht 1.626 m (5' 4\")   Wt 66.6 kg (146 lb 13.2 oz)   SpO2 97%   BMI 25.20 kg/m       Reason for admission: Abdominal pain concerning for SBO.   Admitted from: ED  Report received from:   2 RN skin assessment completed by: Pt refused skin assessment, denied wounds.        -Findings: NA  Bed Algorithm reevaluated: Yes  Was Pulsate ordered?: No  Care plan (primary problem) and Education initiated:  Yes  Flu shot ordered (October-April only): No  Detailed Belongings: Jacket, shoes    Pt arrived ~1400. Denied pain, SOB. Slept most of shift. When awake, pt was irritable, demanding, & uncooperative. Pt refused full skin assessment, refused BG check this evening after NA had to poke pt again. Pt drank apple juice, 5 jello cups this evening. Denies nausea/abdominal discomfort after eating. Denies constipation, passing gas, LBM 3 days ago per pt. L PIV infusing LR @125 ml/hr.     RN Decompensation Assessment (Repeat at 12 hours)  Mentation:  Exam is notable for normal (baseline) mental status    Respiratory mechanics and oxygenation:  Exam is notable for normal/unchanged breathing pattern    Cardiovascular/perfusion:   Exam is notable for normal/unchanged cardiovascular/perfusion assessment  Overall estimation of the patient s condition/stability (1 = stable patient, 7 = appears very sick)? 1 - Patient is stable without signs of deterioration      Goal Outcome Evaluation:    Plan of Care Reviewed With: patient  Overall Patient Progress: no changeOverall Patient Progress: no change  Outcome Evaluation: Tolerating clear diet, no nausea/vomiting yet.      "

## 2022-12-09 NOTE — DISCHARGE SUMMARY
[facility]  Hospitalist Discharge Summary      Date of Admission:  12/7/2022  Date of Discharge:  12/10/2022  Discharging Provider: Arabella Jovel PA-C  Discharge Service: Hospitalist Service, GOLD TEAM 5    Discharge Diagnoses   #Abdominal Pain with c/f Partial SBO, resolved  #Gastric Distension   #Sepsis of Unknown Etiology, resolved  #Hyperglycemia d/t Missed Insulin   #DM2 Hgb A1c 12.8%  #AGMA  #Pseudohyponatremia   #HIV    Follow-ups Needed After Discharge   Follow-up Appointments     Adult [facility] Follow-up and recommended labs and tests      Follow up with primary care provider, Ayala Prieto, within 7 days for   hospital follow- up.      Appointments on [place] and/or [facility] (with [facility] or [facility]   provider or service). Call [phone] if you haven't heard regarding   these appointments within 7 days of discharge.             Unresulted Labs Ordered in the Past 30 Days of this Admission     Date and Time Order Name Status Description    12/8/2022  5:43 AM Blood Culture Hand, Right Preliminary     12/8/2022  5:43 AM Blood Culture Hand, Left Preliminary       These results will be followed up by PCP    Discharge Disposition   Discharged to home  Condition at discharge: Stable      Hospital Course    Anderw Lockwood is a 55 year old male with a past medical history of HIV, GERD, uncontrolled DM 2, HLD, R buttock abscess s/p I&D (11/18/22), prior CNS toxoplasmosis, multiple bowel obstruction/partial bowel obstructions, GERD, constipation, DAVID, substance abuse and MDD who presented to the emergency department for evaluation abdominal pain. Abdominal CT with findings concerning for a possible partial bowel obstruction vs ileus with gastric distention.  He was evaluated by both General Surgery and Gastroenterology who recommended bowel rest without NG tube placement.  Patient's pain had rapid improvement and he was able to advance to a regular diet at  "discharge.  His hospital stay was complicated by hyperglycemia d/t insulin non-compliance. His blood sugars improved by discharge as well.  Further details of his hospital admission are listed below:     #Abdominal Pain with c/f Partial SBO  #Gastric Distension  - Patient with history of recurrent partial SBO.  He endorses a 2-3 day history of worsening pain and abdominal distention. He is passing gas with no nausea or vomiting.  Last meal 3 days ago. CT A/P with \"A few loops of small bowel are mildly prominent in the right lower abdomen, transitioning to normal caliber further downstream, raising the possibility of evolving ileus, or perhaps early mechanical obstruction. No free gas or free fluid. The stomach is considerably distended with residual food material and fluid to the level of the gastroduodenal junction without a discrete lesion. Findings may be attributable to inflammation or ulceration.\" Concerns for possible SBO vs Ileus vs gastric outlet obstruction. General Surgery and Gastroenterology consulted, made NPO w/o NG tube.  Patient with rapid improvement in symptoms, was able to advance diet w/o pain/nausea/vomiting.   -Follow up with PCP within one week of discharge    #Sepsis of Unknown Etiology, resolved - Patient with leukocytosis and tachycardia on initial presentation. Patient noted he developed new cough upon arrival to ED. Per chart review, told admitting provider it was productive but denies sputum production during eval. Chest x-ray negative for infiltrate. Of note, patient with recent drainage of buttocks abscess in November 2022. Concerns from previous provider for recurrent abscess but area visualized w/o erythema or fluctuance. COVID, Influenza A, B were negative.  Antibiotics were discontinued. Wbc WNLs as discharge. Blood cultures with no growth    #Hyperglycemia d/t Missed Insulin, resolved  #DM2 Hgb A1c 12.8%  #AGMA, resolved  #Pseudohyponatremia - Anion gap 18, CO2 21 on arrival with " blood sugars in the 300s in setting of missed insulin. Lactic acid WNLs. Concerns for possible DKA, however beta hydroxybutyrate negative. Received IV fluids and restarted insulin with resolution of acidosis and hyperglycemia.   -Resume PTA metformin and insulin regimen  -Stressed importance of medication compliance     #HIV - Continue PTA biktarvy     Bladder Distention - Patient with distended bladder to umbilicus on admission CT with prostatomegaly.  Patient voiding without difficulties. Post void bladder scan ~115mL.  Discussed with urology who recommend in absence of voiding problems or urinary retention, patient likely was with very full bladder during imaging and may frequently hold it.   -Follow up with PCP should patient develop difficulties urinating.       Consultations This Hospital Stay   GI LUMINAL ADULT IP CONSULT  SURGERY GENERAL ADULT IP CONSULT  GI LUMINAL ADULT IP CONSULT  PHARMACY TO DOSE VANCO  WOUND OSTOMY CONTINENCE NURSE  IP CONSULT    Code Status   Full Code    Time Spent on this Encounter   I, Arabella Jovel PA-C, personally saw the patient today and spent less than or equal to 30 minutes discharging this patient.       Arabella Jovel PA-C  [facility] [address] [address]  Phone: [phone]  ______________________________________________________________________    Physical Exam   Vital Signs: Temp: 97.4  F (36.3  C) Temp src: Oral BP: 100/68 Pulse: 76   Resp: 18 SpO2: 96 % O2 Device: None (Room air)    Weight: 146 lbs 13.22 oz  GENERAL: Alert and oriented x 3. NAD. Ambulatory. Cooperative.   HEENT: Anicteric sclera. Mucous membranes moist. NC. AT.   CV: RRR. S1, S2. No murmurs appreciated.   RESPIRATORY: Effort normal on RA. Lungs CTAB with no wheezing, rales, rhonchi.   GI: Abdomen soft and non distended with normoactive bowel sounds present in all quadrants. No tenderness  NEUROLOGICAL: No focal deficits. Moves all extremities.    EXTREMITIES:  No peripheral edema.    SKIN: No jaundice. No rashes.           Primary Care Physician   Ayala Prieto    Discharge Orders      Reason for your hospital stay    You were admitted for abdominal pain with imaging concerning for a partial bowel obstruction.  You were placed on bowel rest per General Surgery and Gastroenterology recommendations with IV fluids.  Your pain improved and you were able to advance your diet without pain, nausea or vomiting. Your hospital stay was complicated by elevated blood sugars.  You were restarted on your home insulin with improvement in your blood sugars.  You were discharged home in stable condition.     It was a pleasure meeting with you. Thank you for allowing me and my team the privilege of caring for you. You are the reason we are here, and I truly hope we provided you with the excellent service you deserve. Please let us know if there is anything else we can do for you so that we can be sure you are leaving completely satisfied with your care experience.      Take care!    Arabella Jovel PA-C   Hospitalist Service     Activity    Your activity upon discharge: activity as tolerated     Adult [facility] Follow-up and recommended labs and tests    Follow up with primary care provider, Ayala Prieto, within 7 days for hospital follow- up.      Appointments on [place] and/or [facility] (with [facility] or [facility] provider or service). Call [phone] if you haven't heard regarding these appointments within 7 days of discharge.     When to contact your care team    Call your primary doctor if you have any of the following: fever >100.4F, persistent or worsening abdominal pain, change in bowel or urinary habits, persistent nausea and vomiting.     Diet    Follow this diet upon discharge: Carb controlled diabetic friendly diet       Significant Results and Procedures   Results for orders placed or performed during the hospital encounter of 12/07/22   CT Abdomen Pelvis w Contrast     Narrative    EXAM: CT ABDOMEN PELVIS W CONTRAST  LOCATION: [facility]  DATE/TIME: 12/8/2022 2:25 AM    INDICATION: Abdominal pain.  COMPARISON: CT abdomen and pelvis with IV contrast 6/5/2022.  TECHNIQUE: CT scan of the abdomen and pelvis was performed following injection of IV contrast. Multiplanar reformats were obtained. Dose reduction techniques were used.  CONTRAST: 92 mL Isovue-370 IV.    FINDINGS:   LOWER CHEST: Small right middle lobe nodule seen previously is not included in the field-of-study today. Slight dependent atelectasis in the lower lungs. No pleural effusion on either side. Normal cardiac size. No pericardial effusion.    HEPATOBILIARY: Focal fat in the usual location in the left hepatic lobe anteroinferiorly. Tiny hypodense cyst or hemangioma in the right hepatic lobe, too small to further characterize by CT, unchanged. Patent hepatic and portal veins.    PANCREAS: Normal.    SPLEEN: Normal.    ADRENAL GLANDS: Normal.    KIDNEYS/BLADDER: No urinary tract calculi. Both kidneys are well perfused without hydronephrosis or hydroureter. Considerably distended urinary bladder extending to the level of the umbilicus.    BOWEL: Stomach is distended with residual food material and fluid to the level of the gastroduodenal junction. No discrete lesion is identified. Findings may be attributable to inflammation or ulceration. Consider endoscopy for further assessment. Formed   stool material within normal caliber colon. A few loops of small bowel are mildly prominent in the right abdomen containing gas and fluid, transitioning to normal caliber further downstream, possibly representing evolving ileus or perhaps early   mechanical obstruction. No free gas or free fluid.    LYMPH NODES: No suspicious abdominopelvic adenopathy.    VASCULATURE: Slightly atherosclerotic normal caliber distal abdominal aorta measuring 1.6 x 1.6 cm (image 243, series 5). Normal caliber  IVC.    PELVIC ORGANS: Considerably distended urinary bladder extending to the umbilicus. Prostatomegaly. Penile prosthesis with left lower pelvic reservoir. No adenopathy or free fluid.    MUSCULOSKELETAL: Anatomic alignment of the spine, bones and joints of the pelvis.      Impression    IMPRESSION:   1.  A few loops of small bowel are mildly prominent in the right lower abdomen, transitioning to normal caliber further downstream, raising the possibility of evolving ileus, or perhaps early mechanical obstruction. No free gas or free fluid.    2.  The stomach is considerably distended with residual food material and fluid to the level of the gastroduodenal junction without a discrete lesion. Findings may be attributable to inflammation or ulceration. Consider endoscopy for further assessment.    3.  Tiny hypodense cyst or hemangioma in the right hepatic lobe, too small to further characterize by CT, unchanged. Focal fat in the usual location in the left hepatic lobe anteroinferiorly.    4.  Considerably distended urinary bladder extending to the umbilicus. Prostatomegaly. Penile prosthesis with left lower pelvic reservoir.   XR Chest Port 1 View    Narrative    EXAM: XR CHEST PORT 1 VIEW  LOCATION: [facility]  DATE/TIME: 12/8/2022 5:41 AM    INDICATION: Immunocompromised patient with abdominal pain and tachycardia. Evaluate for pneumonia.  COMPARISON: 09/08/2022      Impression    IMPRESSION: Negative chest.     *Note: Due to a large number of results and/or encounters for the requested time period, some results have not been displayed. A complete set of results can be found in Results Review.       Discharge Medications   Current Discharge Medication List      CONTINUE these medications which have NOT CHANGED    Details   Alcohol Swabs PADS 1 swab daily  Qty: 100 each, Refills: 0    Associated Diagnoses: Status post incision and drainage     "  bictegravir-emtricitabine-tenofovir (BIKTARVY) -25 MG per tablet Take 1 tablet by mouth daily  Qty: 30 tablet, Refills: 0    Associated Diagnoses: Human immunodeficiency virus (HIV) disease (H)      Gauze Pads & Dressings (GAUZE PADS 4\"X4\") 4\"X4\" PADS 2 each daily  Qty: 30 each, Refills: 0    Associated Diagnoses: Status post incision and drainage      insulin detemir (LEVEMIR PEN) 100 UNIT/ML pen Inject 17 Units Subcutaneous every 24 hours  Qty: 15 mL, Refills: 1    Associated Diagnoses: Type 2 diabetes mellitus with hyperglycemia, with long-term current use of insulin (H)      insulin pen needle (32G X 4 MM) 32G X 4 MM miscellaneous Use pen needles three times daily before meals and also before bed.  Qty: 200 each, Refills: 0    Associated Diagnoses: Type 2 diabetes mellitus with hyperglycemia, with long-term current use of insulin (H)      metFORMIN (GLUCOPHAGE XR) 500 MG 24 hr tablet Take 3 tablets (1,500 mg) by mouth daily (with dinner) With food  Qty: 90 tablet, Refills: 0    Associated Diagnoses: Type 2 diabetes mellitus with hyperglycemia, with long-term current use of insulin (H)      mupirocin (BACTROBAN) 2 % external ointment Apply topically 3 times daily  Qty: 15 g, Refills: 0    Associated Diagnoses: Paronychia of toe, left      pantoprazole (PROTONIX) 20 MG EC tablet Take 1 tablet (20 mg) by mouth 2 times daily  Qty: 60 tablet, Refills: 0    Associated Diagnoses: Gastroesophageal reflux disease with esophagitis, unspecified whether hemorrhage      sterile water, bottle, irrigation Irrigate with 1,000 mLs as directed daily  Qty: 1000 mL, Refills: 0    Associated Diagnoses: Status post incision and drainage           Allergies   Allergies   Allergen Reactions     Fentanyl Blisters     Per pt, after taking this medication had blisters develope     Tylenol [Acetaminophen] Itching     Dulaglutide Rash     Insulin Lispro Rash     Patient reported     Penicillin V Other (See Comments) and Rash     " "Diffuse maculopapular rash + feels \"high\", per pt.      "

## 2022-12-09 NOTE — PLAN OF CARE
RN assumed cares [id number]     Vitals: AVSS on RA.   Pain: denied abdominal pain at 0800 but c/o abd pain after eating regular diet meal at 1030. MD aware and ordered tylenol but pt declined.   Neuro: Alert but refused to answer orientation questions. At 1240 pt c/o dizziness, writer attempted to obtain orthostatic VS and charted lying and sitting VS but pt was too fatigued to stand long enough for BP and laid back down in bed. No significance in laying and sitting BP.      Cardiac: Pt declined heart auscultation.   Respiratory: Sating well on RA. Pt declined assessment.   GI/:Pt reports voiding and a brown formed stool this morning which he cannot recall the size. Pt bladder scanned randomly per MD request-112ml.   IV/Drains: PIV saline locked.   Skin: refused 2 RN skin assessment. Pt refused to let WOCN assess buttock abscess.  Activity: up independently in room.   Nutrition: fabian FLD but reported abd pain with reg diet. Diet changed back to CLD.   Labs: Pt reports voiding and a brown formed stool this morning which he cannot recall the size.     Events/plan: Pt slept off and on all day refusing nursing assessment including skin assessment, SCDs & meds (mupirocin ointment, dulcolax suppository, nicoderm patch-MD aware).  Discharge on hold due to not tolerating Reg diet (abd pain). P: keep pt observation tonight, re-eval abd pain tomorrow. CLD. Possible trial of reglan today per BHAVANA.

## 2022-12-09 NOTE — PROGRESS NOTES
"[facility]    Medicine Progress Note - Hospitalist Service, GOLD TEAM 5    Date of Admission:  12/7/2022    Assessment & Plan   Andrew Lockwood is a 55 year old male with a past medical history of HIV, GERD, uncontrolled DM 2, HLD, R buttock abscess s/p I&D (11/18/22), prior CNS toxoplasmosis, multiple bowel obstruction/partial bowel obstructions, GERD, constipation, DAVID, substance abuse and MDD who presents to the emergency department for evaluation abdominal pain. He is now admitted to internal medicine for further monitoring and treatment.      #Abdominal Pain with c/f Partial SBO  #Gastric Distension  - Patient with history of recurrent partial SBO.  He endorses a 2-3 day history of worsening pain and abdominal distention. He is passing gas with no nausea or vomiting.  Last meal 3 days ago. CT A/P with \"A few loops of small bowel are mildly prominent in the right lower abdomen, transitioning to normal caliber further downstream, raising the possibility of evolving ileus, or perhaps early mechanical obstruction. No free gas or free fluid. The stomach is considerably distended with residual food material and fluid to the level of the gastroduodenal junction without a discrete lesion. Findings may be attributable to inflammation or ulceration.\" Concerns for possible SBO vs Ileus vs gastric outlet obstruction vs inflammation.  Patient able to advance diet to regular but then had recurrent pain.  No emesis, having bowel movements.   -General Surgery and Gastroenterology consulted, appreciate recs and assistance.   -Discussed distended bladder with Urology, in absence of urinary issues, patient likely holding large volumes in between urinating.  If PVR elevated, then would recommend formal eval for obstruction.    -Return to clear liquids   -Will consider trial of reglan      #Sepsis of Unknown Etiology, resolved - Patient with leukocytosis and tachycardia on initial " presentation. Patient notes he developed new cough upon arrival to ED. Per chart review, told admitting provider it was productive but denies sputum production during eval. Chest x-ray negative for infiltrate. Of note, patient with recent drainage of buttocks abscess in November 2022. Concerns from previous provider for recurrent abscess but area visualized w/o erythema or fluctuance. COVID, Influenza A and B negative.  Blood cultures negative.   -Monitor off of antibiotics        #Hyperglycemia d/t Missed Insulin   #DM2 Hgb A1c 12.8%  #AGMA, resolved  #Pseudohyponatremia - Anion gap 18, CO2 21 on arrival with blood sugars in the 300s in setting of missed insulin. Lactic acid WNLs. Concerns for possible DKA, however Beta hydroxybutyrate negative.   -Continue sliding scale insulin  -Continue 12 units Levemir, hope to resume home dosage when oral intake improves   -Hold metformin        #HIV - Continue PTA biktarvy           Diet: Diet  Clear Liquid Diet    DVT Prophylaxis: Enoxaparin (Lovenox) SQ  Potts Catheter: Not present  Central Lines: None  Cardiac Monitoring: None  Code Status: Full Code      Disposition Plan      Expected Discharge Date: 12/09/2022,  3:00 PM    Destination: shelter  Discharge Comments: Dispo: Observation - [facility] - Bed hold   Plan: Optimize Bowel Regimen as able;  Progress: CLD ->  Regular        The patient's care was discussed with the Attending Physician, Dr. Jay Tobin.    Arabella Jovel PA-C  Hospitalist Service, GOLD TEAM [address] [place] [address]  Securely message with the Vocera Web Console (learn more here)  Text page via [facility] Paging/Directory   Please see signed in provider for up to date coverage information      Clinically Significant Risk Factors Present on Admission         # Hyponatremia: Lowest Na = 135 mmol/L in last 2 days, will monitor as appropriate  # Hypocalcemia: Lowest Ca = 8.4 mg/dL in last 2 days, will monitor and replace  "as appropriate  # Hypercalcemia: Highest Ca = 10.2 mg/dL in last 2 days, will monitor as appropriate    # Hypoalbuminemia: Lowest albumin = 3.4 g/dL at 12/8/2022  6:24 AM, will monitor as appropriate         # DMII: A1C = 12.8 % (Ref range: <5.7 %) within past 3 months    # Overweight: Estimated body mass index is 25.2 kg/m  as calculated from the following:    Height as of this encounter: 1.626 m (5' 4\").    Weight as of this encounter: 66.6 kg (146 lb 13.2 oz).           ______________________________________________________________________    Interval History   All overnight events reviewed.  Patient initially with improvement in abdominal pain with advancement in diet.  However, then with recurrent abdominal pain after full regular diet.   Patient notes he is having bowel movements and is urinating without difficulty.  He is agreeable to staying another day     Data reviewed today: I reviewed all medications, new labs and imaging results over the last 24 hours.    Physical Exam   Vital Signs: Temp: 97.4  F (36.3  C) Temp src: Oral BP: 100/68 Pulse: 76   Resp: 18 SpO2: 96 % O2 Device: None (Room air)    Weight: 146 lbs 13.22 oz  GENERAL: Alert and oriented x 3. NAD. Ambulatory. Cooperative.   HEENT: Anicteric sclera. Mucous membranes moist. NC. AT.   CV: RRR. S1, S2. No murmurs appreciated.   RESPIRATORY: Effort normal on RA. Lungs CTAB with no wheezing, rales, rhonchi.   GI: Abdomen soft and non distended with normoactive bowel sounds present in all quadrants. No tenderness   NEUROLOGICAL: No focal deficits. Moves all extremities.    EXTREMITIES: No peripheral edema. Intact bilateral pedal pulses.   SKIN: No jaundice. No rashes.        Data   Recent Labs   Lab 12/09/22  0813 12/09/22  0305 12/08/22  2153 12/08/22  1323 12/08/22  1126 12/08/22  1103 12/08/22  1045 12/08/22  0850 12/08/22  0624 12/08/22  0606 12/07/22  2319   WBC 6.8  --   --   --   --   --  7.6  --   --   --  13.2*   HGB 12.7*  --   --   --  " 12.9*  --  9.1*  --   --   --  16.6   MCV 91  --   --   --   --   --  95  --   --   --  91     --   --   --   --   --  187  --   --   --  404   INR  --   --   --   --   --   --   --   --  1.07  --   --      --   --   --   --   --   --   --  135*  --  138   POTASSIUM 3.4  --   --   --   --   --   --   --  3.5  --  3.7   CHLORIDE 105  --   --   --   --   --   --   --  105  --  99   CO2 22  --   --   --   --   --   --   --  18*  --  21*   BUN 7.5  --   --   --   --   --   --   --  15.7  --  18.3   CR 0.59*  --   --   --   --   --   --   --  0.55*  --  0.68   ANIONGAP 11  --   --   --   --   --   --   --  12  --  18*   LINDA 8.6  --   --   --   --   --   --   --  8.4*  --  10.2*   * 206* 300*   < >  --    < >  --    < > 239*   < > 318*   ALBUMIN  --   --   --   --   --   --   --   --  3.4*  --  4.3   PROTTOTAL  --   --   --   --   --   --   --   --  6.6  --  7.7   BILITOTAL  --   --   --   --   --   --   --   --  0.6  --  0.9   ALKPHOS  --   --   --   --   --   --   --   --  105  --  119   ALT  --   --   --   --   --   --   --   --  10  --  19   AST  --   --   --   --   --   --   --   --  14  --  20   LIPASE  --   --   --   --   --   --   --   --   --   --  34    < > = values in this interval not displayed.

## 2022-12-10 VITALS
DIASTOLIC BLOOD PRESSURE: 69 MMHG | SYSTOLIC BLOOD PRESSURE: 109 MMHG | HEIGHT: 64 IN | RESPIRATION RATE: 16 BRPM | BODY MASS INDEX: 25.29 KG/M2 | HEART RATE: 95 BPM | WEIGHT: 148.15 LBS | TEMPERATURE: 98.4 F | OXYGEN SATURATION: 98 %

## 2022-12-10 LAB
ANION GAP SERPL CALCULATED.3IONS-SCNC: 12 MMOL/L (ref 7–15)
BUN SERPL-MCNC: 7.7 MG/DL (ref 6–20)
CALCIUM SERPL-MCNC: 8.7 MG/DL (ref 8.6–10)
CHLORIDE SERPL-SCNC: 101 MMOL/L (ref 98–107)
CREAT SERPL-MCNC: 0.58 MG/DL (ref 0.67–1.17)
DEPRECATED HCO3 PLAS-SCNC: 21 MMOL/L (ref 22–29)
ERYTHROCYTE [DISTWIDTH] IN BLOOD BY AUTOMATED COUNT: 12.2 % (ref 10–15)
GFR SERPL CREATININE-BSD FRML MDRD: >90 ML/MIN/1.73M2
GLUCOSE BLDC GLUCOMTR-MCNC: 223 MG/DL (ref 70–99)
GLUCOSE BLDC GLUCOMTR-MCNC: 331 MG/DL (ref 70–99)
GLUCOSE SERPL-MCNC: 378 MG/DL (ref 70–99)
HCT VFR BLD AUTO: 37.5 % (ref 40–53)
HGB BLD-MCNC: 12.6 G/DL (ref 13.3–17.7)
MCH RBC QN AUTO: 30.9 PG (ref 26.5–33)
MCHC RBC AUTO-ENTMCNC: 33.6 G/DL (ref 31.5–36.5)
MCV RBC AUTO: 92 FL (ref 78–100)
PLATELET # BLD AUTO: 279 10E3/UL (ref 150–450)
POTASSIUM SERPL-SCNC: 3.5 MMOL/L (ref 3.4–5.3)
RBC # BLD AUTO: 4.08 10E6/UL (ref 4.4–5.9)
SODIUM SERPL-SCNC: 134 MMOL/L (ref 136–145)
WBC # BLD AUTO: 5.5 10E3/UL (ref 4–11)

## 2022-12-10 PROCEDURE — 36415 COLL VENOUS BLD VENIPUNCTURE: CPT | Performed by: PHYSICIAN ASSISTANT

## 2022-12-10 PROCEDURE — 85027 COMPLETE CBC AUTOMATED: CPT | Performed by: PHYSICIAN ASSISTANT

## 2022-12-10 PROCEDURE — 80048 BASIC METABOLIC PNL TOTAL CA: CPT | Performed by: PHYSICIAN ASSISTANT

## 2022-12-10 PROCEDURE — 250N000013 HC RX MED GY IP 250 OP 250 PS 637: Performed by: PHYSICIAN ASSISTANT

## 2022-12-10 PROCEDURE — 250N000013 HC RX MED GY IP 250 OP 250 PS 637

## 2022-12-10 PROCEDURE — 99207 PR APP CREDIT; MD BILLING SHARED VISIT: CPT | Performed by: PHYSICIAN ASSISTANT

## 2022-12-10 PROCEDURE — 99217 PR OBSERVATION CARE DISCHARGE: CPT | Mod: FS | Performed by: STUDENT IN AN ORGANIZED HEALTH CARE EDUCATION/TRAINING PROGRAM

## 2022-12-10 PROCEDURE — 96372 THER/PROPH/DIAG INJ SC/IM: CPT

## 2022-12-10 PROCEDURE — 82962 GLUCOSE BLOOD TEST: CPT

## 2022-12-10 PROCEDURE — G0378 HOSPITAL OBSERVATION PER HR: HCPCS

## 2022-12-10 RX ORDER — SIMETHICONE 80 MG
80 TABLET,CHEWABLE ORAL EVERY 6 HOURS PRN
Status: DISCONTINUED | OUTPATIENT
Start: 2022-12-10 | End: 2022-12-10 | Stop reason: HOSPADM

## 2022-12-10 RX ADMIN — BICTEGRAVIR SODIUM, EMTRICITABINE, AND TENOFOVIR ALAFENAMIDE FUMARATE 1 TABLET: 50; 200; 25 TABLET ORAL at 09:23

## 2022-12-10 RX ADMIN — MUPIROCIN: 20 OINTMENT TOPICAL at 09:24

## 2022-12-10 RX ADMIN — INSULIN DETEMIR 12 UNITS: 100 INJECTION, SOLUTION SUBCUTANEOUS at 09:23

## 2022-12-10 RX ADMIN — PANTOPRAZOLE SODIUM 20 MG: 20 TABLET, DELAYED RELEASE ORAL at 09:23

## 2022-12-10 RX ADMIN — METOCLOPRAMIDE 10 MG: 10 TABLET ORAL at 09:23

## 2022-12-10 ASSESSMENT — ACTIVITIES OF DAILY LIVING (ADL)
ADLS_ACUITY_SCORE: 35

## 2022-12-10 NOTE — PROGRESS NOTES
OBSERVATION GOALS:  - Tolerating PO intake w/o abdominal pain: NOT MET, pt experienced abd pain after eating this afternoon. Trialing full liquid diet this evening.

## 2022-12-10 NOTE — PLAN OF CARE
Goal Outcome Evaluation: Pt alert and oriented x4. Denied abdominal pain, N/V. Reported feeling hungry and requesting snacks. Tolerated full liquid diet. Showered independently. Reported feeling dizzy while lying in bed. Also reported loose stools toward end of evening. Refused full skin assessment. Continue to assess GI status. Monitor for continued dizziness.       Plan of Care Reviewed With: patient    Overall Patient Progress: no changeOverall Patient Progress: no change    Outcome Evaluation: Tolerating full liquid diet this evening. Denied abdominal pain.

## 2022-12-10 NOTE — PLAN OF CARE
Goal Outcome Evaluation:      Plan of Care Reviewed With: patient    Overall Patient Progress: no changeOverall Patient Progress: no change    Outcome Evaluation: Tolerating full liquid diet this evening. Denied abdominal pain.       Patient refused for blood glucose check and nicotine placement check. Patient verbalized to check it in the morning.

## 2022-12-10 NOTE — PLAN OF CARE
Patient asleep on bed, orientedx4, independent. Vitally stable. On contact precaution. With history of HIV/AIDS.Refused to be assessed. No complain of abdominal pain the whole shift. As per endorsment, patient is supposed for discharged on regular diet but complain of abdominal pain. Discharged plan held. Kept rested and made comfortable.       Goal Outcome Evaluation:      Plan of Care Reviewed With: patient    Overall Patient Progress: no changeOverall Patient Progress: no change    Outcome Evaluation: Tolerating full liquid diet this evening. Denied abdominal pain.    Observation goal of tolerating po with no complaints of abdominal pain.:progressing.  Sleeping with no po intake or complaints of pain.

## 2022-12-10 NOTE — PLAN OF CARE
Discharged to: shelter  Transportation: cab  Time: 1235  Prescriptions: none  Belongings: all packed and sent with pt  PIV/Access: PIV deaccessed  Care Plan and Education discontinued: yes  Paperwork: discussed with and given to pt    AxOx4. VSS on RA. Up ad lexi. Denied pain and nausea. Changed to regular diet and tolerated well. Reported some dizziness with getting dressed this morning, recovered with sitting and stated the room was too hot -- BP and HR stable, room temp decreased and pt felt better. Pt told to wait at front entrance for cab pickup.

## 2022-12-10 NOTE — PROGRESS NOTES
OBSERVATION GOALS:  - Tolerating PO intake w/o abdominal pain: NOT MET but in progress. Pt had full liquid diet for dinner and tolerated well. Ate pudding and broth. No c/o abdominal pain after meal. Asking for snacks and reporting feeling hungry.

## 2022-12-10 NOTE — PROGRESS NOTES
Care Management Discharge Note    Discharge Date: 12/10/2022  Discharge Disposition:  Home/Shelter; confirmed bed hold at [facility], pt may return for resumption of residency  Discharge Services: County Worker/Programs   Discharge DME:  none  Discharge Transportation: Patient reports he will take the bus  Private pay costs discussed: Not applicable  PAS Confirmation Code:  NA  Patient/family educated on Medicare website which has current facility and service quality ratings:  NA  Education Provided on the Discharge Plan:  yes  Persons Notified of Discharge Plans: yes  Patient/Family in Agreement with the Plan:  yes  Handoff Referral Completed: Yes    Additional Information:   met with patient at bedside at patient's request.  Patient reported that he planned to discharge today and that he would need the following items: a hoodie, hat, gloves, socks, backpack and handwarmers.   was able to procure all items for patient except for handwarmers from the  main office. Patient was grateful and declined any further needs from  prior to discharge.     RADHA Cagle, [facility]  [address]   [email or web address]  Office: [phone]   Pager: [phone]        RADHA CAGLE

## 2022-12-13 LAB
BACTERIA BLD CULT: NO GROWTH
BACTERIA BLD CULT: NO GROWTH

## 2022-12-15 ENCOUNTER — HOSPITAL ENCOUNTER (EMERGENCY)
Facility: CLINIC | Age: 57
Discharge: HOME | End: 2022-12-15
Attending: EMERGENCY MEDICINE | Admitting: EMERGENCY MEDICINE
Payer: COMMERCIAL

## 2022-12-15 ENCOUNTER — APPOINTMENT (OUTPATIENT)
Dept: CT IMAGING | Facility: CLINIC | Age: 57
End: 2022-12-15
Attending: EMERGENCY MEDICINE
Payer: COMMERCIAL

## 2022-12-15 VITALS
TEMPERATURE: 97.7 F | HEART RATE: 85 BPM | WEIGHT: 140.8 LBS | DIASTOLIC BLOOD PRESSURE: 55 MMHG | OXYGEN SATURATION: 96 % | RESPIRATION RATE: 18 BRPM | SYSTOLIC BLOOD PRESSURE: 91 MMHG | BODY MASS INDEX: 24.95 KG/M2 | HEIGHT: 63 IN

## 2022-12-15 DIAGNOSIS — K21.00 GASTROESOPHAGEAL REFLUX DISEASE WITH ESOPHAGITIS, UNSPECIFIED WHETHER HEMORRHAGE: ICD-10-CM

## 2022-12-15 DIAGNOSIS — R10.13 ABDOMINAL PAIN, EPIGASTRIC: ICD-10-CM

## 2022-12-15 LAB
ALBUMIN SERPL BCG-MCNC: 3.7 G/DL (ref 3.5–5.2)
ALP SERPL-CCNC: 126 U/L (ref 40–129)
ALT SERPL W P-5'-P-CCNC: 13 U/L (ref 10–50)
ANION GAP SERPL CALCULATED.3IONS-SCNC: 8 MMOL/L (ref 7–15)
AST SERPL W P-5'-P-CCNC: 13 U/L (ref 10–50)
BASOPHILS # BLD AUTO: 0.1 10E3/UL (ref 0–0.2)
BASOPHILS NFR BLD AUTO: 1 %
BILIRUB SERPL-MCNC: 0.6 MG/DL
BUN SERPL-MCNC: 21.7 MG/DL (ref 6–20)
CALCIUM SERPL-MCNC: 8.9 MG/DL (ref 8.6–10)
CHLORIDE SERPL-SCNC: 104 MMOL/L (ref 98–107)
CREAT SERPL-MCNC: 0.63 MG/DL (ref 0.67–1.17)
DEPRECATED HCO3 PLAS-SCNC: 21 MMOL/L (ref 22–29)
EOSINOPHIL # BLD AUTO: 0.2 10E3/UL (ref 0–0.7)
EOSINOPHIL NFR BLD AUTO: 2 %
ERYTHROCYTE [DISTWIDTH] IN BLOOD BY AUTOMATED COUNT: 12.7 % (ref 10–15)
GFR SERPL CREATININE-BSD FRML MDRD: >90 ML/MIN/1.73M2
GLUCOSE SERPL-MCNC: 276 MG/DL (ref 70–99)
HCT VFR BLD AUTO: 41.7 % (ref 40–53)
HGB BLD-MCNC: 13.6 G/DL (ref 13.3–17.7)
IMM GRANULOCYTES # BLD: 0.1 10E3/UL
IMM GRANULOCYTES NFR BLD: 1 %
LIPASE SERPL-CCNC: 37 U/L (ref 13–60)
LYMPHOCYTES # BLD AUTO: 2.4 10E3/UL (ref 0.8–5.3)
LYMPHOCYTES NFR BLD AUTO: 30 %
MCH RBC QN AUTO: 30.5 PG (ref 26.5–33)
MCHC RBC AUTO-ENTMCNC: 32.6 G/DL (ref 31.5–36.5)
MCV RBC AUTO: 94 FL (ref 78–100)
MONOCYTES # BLD AUTO: 0.5 10E3/UL (ref 0–1.3)
MONOCYTES NFR BLD AUTO: 7 %
NEUTROPHILS # BLD AUTO: 4.9 10E3/UL (ref 1.6–8.3)
NEUTROPHILS NFR BLD AUTO: 59 %
NRBC # BLD AUTO: 0 10E3/UL
NRBC BLD AUTO-RTO: 0 /100
PLATELET # BLD AUTO: 334 10E3/UL (ref 150–450)
POTASSIUM SERPL-SCNC: 3.9 MMOL/L (ref 3.4–5.3)
PROT SERPL-MCNC: 6.5 G/DL (ref 6.4–8.3)
RBC # BLD AUTO: 4.46 10E6/UL (ref 4.4–5.9)
SODIUM SERPL-SCNC: 133 MMOL/L (ref 136–145)
WBC # BLD AUTO: 8.2 10E3/UL (ref 4–11)

## 2022-12-15 PROCEDURE — 99285 EMERGENCY DEPT VISIT HI MDM: CPT | Performed by: EMERGENCY MEDICINE

## 2022-12-15 PROCEDURE — 250N000009 HC RX 250: Performed by: EMERGENCY MEDICINE

## 2022-12-15 PROCEDURE — 96374 THER/PROPH/DIAG INJ IV PUSH: CPT | Mod: 59 | Performed by: EMERGENCY MEDICINE

## 2022-12-15 PROCEDURE — 250N000013 HC RX MED GY IP 250 OP 250 PS 637: Performed by: EMERGENCY MEDICINE

## 2022-12-15 PROCEDURE — 99285 EMERGENCY DEPT VISIT HI MDM: CPT | Mod: 25 | Performed by: EMERGENCY MEDICINE

## 2022-12-15 PROCEDURE — 36415 COLL VENOUS BLD VENIPUNCTURE: CPT | Performed by: EMERGENCY MEDICINE

## 2022-12-15 PROCEDURE — 74177 CT ABD & PELVIS W/CONTRAST: CPT | Mod: 26 | Performed by: RADIOLOGY

## 2022-12-15 PROCEDURE — 83690 ASSAY OF LIPASE: CPT | Performed by: EMERGENCY MEDICINE

## 2022-12-15 PROCEDURE — 250N000011 HC RX IP 250 OP 636: Performed by: EMERGENCY MEDICINE

## 2022-12-15 PROCEDURE — 96361 HYDRATE IV INFUSION ADD-ON: CPT | Performed by: EMERGENCY MEDICINE

## 2022-12-15 PROCEDURE — 80053 COMPREHEN METABOLIC PANEL: CPT | Performed by: EMERGENCY MEDICINE

## 2022-12-15 PROCEDURE — 258N000003 HC RX IP 258 OP 636: Performed by: EMERGENCY MEDICINE

## 2022-12-15 PROCEDURE — 74177 CT ABD & PELVIS W/CONTRAST: CPT

## 2022-12-15 PROCEDURE — 96376 TX/PRO/DX INJ SAME DRUG ADON: CPT | Performed by: EMERGENCY MEDICINE

## 2022-12-15 PROCEDURE — 85025 COMPLETE CBC W/AUTO DIFF WBC: CPT | Performed by: EMERGENCY MEDICINE

## 2022-12-15 RX ORDER — IOPAMIDOL 755 MG/ML
86 INJECTION, SOLUTION INTRAVASCULAR ONCE
Status: COMPLETED | OUTPATIENT
Start: 2022-12-15 | End: 2022-12-15

## 2022-12-15 RX ORDER — HYDROMORPHONE HYDROCHLORIDE 1 MG/ML
0.5 INJECTION, SOLUTION INTRAMUSCULAR; INTRAVENOUS; SUBCUTANEOUS ONCE
Status: COMPLETED | OUTPATIENT
Start: 2022-12-15 | End: 2022-12-15

## 2022-12-15 RX ORDER — PANTOPRAZOLE SODIUM 20 MG/1
20 TABLET, DELAYED RELEASE ORAL 2 TIMES DAILY
Qty: 60 TABLET | Refills: 0 | Status: SHIPPED | OUTPATIENT
Start: 2022-12-15 | End: 2023-01-25

## 2022-12-15 RX ADMIN — SODIUM CHLORIDE, PRESERVATIVE FREE 72 ML: 5 INJECTION INTRAVENOUS at 13:30

## 2022-12-15 RX ADMIN — HYDROMORPHONE HYDROCHLORIDE 0.5 MG: 1 INJECTION, SOLUTION INTRAMUSCULAR; INTRAVENOUS; SUBCUTANEOUS at 12:27

## 2022-12-15 RX ADMIN — LIDOCAINE HYDROCHLORIDE 30 ML: 20 SOLUTION OROPHARYNGEAL at 09:31

## 2022-12-15 RX ADMIN — SODIUM CHLORIDE 1000 ML: 9 INJECTION, SOLUTION INTRAVENOUS at 09:39

## 2022-12-15 RX ADMIN — IOPAMIDOL 86 ML: 755 INJECTION, SOLUTION INTRAVENOUS at 13:30

## 2022-12-15 RX ADMIN — HYDROMORPHONE HYDROCHLORIDE 1 MG: 1 INJECTION, SOLUTION INTRAMUSCULAR; INTRAVENOUS; SUBCUTANEOUS at 09:34

## 2022-12-15 ASSESSMENT — ACTIVITIES OF DAILY LIVING (ADL)
ADLS_ACUITY_SCORE: 37
ADLS_ACUITY_SCORE: 37
ADLS_ACUITY_SCORE: 35
ADLS_ACUITY_SCORE: 37

## 2022-12-15 ASSESSMENT — ENCOUNTER SYMPTOMS
COLOR CHANGE: 0
HEADACHES: 0
NECK STIFFNESS: 0
SHORTNESS OF BREATH: 0
ROS GI COMMENTS: +BLACK STOOL
DIFFICULTY URINATING: 0
FEVER: 0
ABDOMINAL PAIN: 1
CONFUSION: 0
EYE REDNESS: 0
ARTHRALGIAS: 0

## 2022-12-15 NOTE — DISCHARGE INSTRUCTIONS
Please make an appointment to follow up with Your Primary Care Provider if not improving.    Protonix as directed.    Return to the emergency department for any problems.

## 2022-12-15 NOTE — PROGRESS NOTES
Care Management Discharge Note    Discharge Date:  12/15       Discharge Disposition:  Home     Discharge Services:      Discharge DME:      Discharge Transportation:      Private pay costs discussed: Not applicable    PAS Confirmation Code:    Patient/family educated on Medicare website which has current facility and service quality ratings:      Education Provided on the Discharge Plan:    Persons Notified of Discharge Plans: EDT  Patient/Family in Agreement with the Plan:      Handoff Referral Completed: No    Additional Information:  Writer received call from EDT. Pt requires a ride to get home. RN confirmed pt's address listed in facesheet is correct.     Writer attempted to set up pt with ride via [employer] (Ph: [phone]) however, they did not have pt's . Writer was unable to sit on hold to sort out the records. Writer set pt up with a one time vouchered cab via [employer] (P: [phone])    Writer updated EDT on plan.  ________________    RADHA Lehman, [facility]  ED/Observation   [facility]  Phone: [phone]  Pager: [phone]  Fax: [phone]    On-call pager, [phone], 4:00pm to midnight

## 2022-12-15 NOTE — ED PROVIDER NOTES
"      [place] EMERGENCY DEPARTMENT ([facility])    12/15/22       ED PROVIDER NOTE      History     Chief Complaint   Patient presents with     Abdominal Pain     The history is provided by the patient and medical records.     Andrew Lockwood is a 55 year old male with a past medical history of HIV, GERD, uncontrolled DM 2, HLD, prior CNS toxoplasmosis, multiple bowel obstruction/partial bowel obstructions, GERD, constipation, DAVID, substance abuse and MDD who presents to the ED for evaluation of upper abdominal pain for 2 days.  The patient also endorses a feeling of \"heartburn\".  He states he has had the symptoms intermittently for 3 years and usually receives a GI cocktail and analgesics.  He also endorses black stools 2 days ago and has not had a BM since then.  He denies taking any Pepto-Bismol.  He denies nausea or vomiting.  No fever.    Per chart review, patient was admitted at [facility] 12/7/2022- - 12/10/2022 after presenting with abdominal pain. Abdominal CT with findings concerning for a possible partial bowel obstruction vs ileus with gastric distention.  He was evaluated by both General Surgery and Gastroenterology who recommended bowel rest without NG tube placement.  Patient's pain had rapid improvement and he was able to advance to a regular diet at discharge.  His hospital stay was complicated by hyperglycemia d/t insulin non-compliance. His blood sugars improved by discharge as well.  He was advised to follow-up with his PCP within 1 week of discharge.    Past Medical History  Past Medical History:   Diagnosis Date     AIDS (H)      Allergic rhinitis due to other allergen     DNS     Anal dysplasia      Chronic abdominal pain      CNS toxoplasmosis (H)      Diabetes type 2, controlled (H)      GERD (gastroesophageal reflux disease)      History of seizure      History of substance abuse (H)      HIV (human immunodeficiency virus infection) (H)      HLD (hyperlipidemia)      Lung nodules      " "Periungual wart      PTSD (post-traumatic stress disorder)      Sleep apnea     doesn't use CPAP     Past Surgical History:   Procedure Laterality Date     ANOSCOPY N/A 9/9/2020    Procedure: Exam Under Anesthesia, ANOSCOPY, fulgeration of rectal fissures with Rectal Biopsies;  Surgeon: Thanh Lundberg MD;  Location: [facility] OR     COLONOSCOPY Left 1/22/2016    Procedure: COMBINED COLONOSCOPY, SINGLE OR MULTIPLE BIOPSY/POLYPECTOMY BY BIOPSY;  Surgeon: Clark Saini MD;  Location: [facility] GI     ESOPHAGOSCOPY, GASTROSCOPY, DUODENOSCOPY (EGD), COMBINED N/A 6/6/2022    Procedure: ESOPHAGOGASTRODUODENOSCOPY, WITH BIOPSY;  Surgeon: Kecia Benítez MD;  Location: [facility] GI     HC EXPLORE UNDESC TESTIS,INGUIN/SCROTAL       LAPAROSCOPIC APPENDECTOMY N/A 1/31/2018    Procedure: LAPAROSCOPIC APPENDECTOMY;  LAPAROSCOPIC APPENDECTOMY;  Surgeon: Dawn Holt MD;  Location: [facility] OR     LAPAROSCOPY DIAGNOSTIC (GENERAL) N/A 7/26/2016    Procedure: LAPAROSCOPY DIAGNOSTIC (GENERAL);  Surgeon: Susannah Arriaga MD;  Location: [facility] OR     LAPAROSCOPY DIAGNOSTIC (GENERAL) N/A 4/16/2018    Procedure: LAPAROSCOPY DIAGNOSTIC (GENERAL);  Diagnostic laparoscopy and lysis of adhesions;  Surgeon: Prince Dowling MD;  Location: [facility] OR     OPTICAL TRACKING SYSTEM CRANIOTOMY, EXCISE TUMOR, COMBINED Left 4/10/2015    Procedure: COMBINED OPTICAL TRACKING SYSTEM CRANIOTOMY, EXCISE TUMOR;  Surgeon: Mirlande Colmenares MD;  Location: [facility] OR     REPAIR GAMEKEEPER'S THUMB Right 12/2/2016    Procedure: REPAIR LIGAMENT ULNAR COLLATERAL THUMB (GAMEKEEPER'S);  Surgeon: Evin Zamorano MD;  Location: [facility] OR     ZZC NONSPECIFIC PROCEDURE      right forearm fracture     pantoprazole (PROTONIX) 20 MG EC tablet  Alcohol Swabs PADS  bictegravir-emtricitabine-tenofovir (BIKTARVY) -25 MG per tablet  Gauze Pads & Dressings (GAUZE PADS 4\"X4\") 4\"X4\" PADS  insulin detemir (LEVEMIR PEN) 100 UNIT/ML pen  insulin pen needle (32G X 4 MM) 32G X 4 MM " "miscellaneous  metFORMIN (GLUCOPHAGE XR) 500 MG 24 hr tablet  mupirocin (BACTROBAN) 2 % external ointment  sterile water, bottle, irrigation      Allergies   Allergen Reactions     Fentanyl Blisters     Per pt, after taking this medication had blisters develope     Tylenol [Acetaminophen] Itching     Dulaglutide Rash     Insulin Lispro Rash     Patient reported     Penicillin V Other (See Comments) and Rash     Diffuse maculopapular rash + feels \"high\", per pt.      Family History  Family History   Problem Relation Age of Onset     Diabetes Brother      Diabetes Father      Alzheimer Disease Father      Unknown/Adopted Mother      Diabetes Paternal Grandfather      Cancer No family hx of         no skin cancer     Skin Cancer No family hx of         no famiy hx of skin cancer     Glaucoma No family hx of      Macular Degeneration No family hx of      Social History   Social History     Tobacco Use     Smoking status: Some Days     Packs/day: 0.25     Years: 40.00     Pack years: 10.00     Types: Cigarettes     Smokeless tobacco: Former   Substance Use Topics     Alcohol use: No     Alcohol/week: 0.0 standard drinks     Comment: Last etoh in 2007     Drug use: Not Currently     Types: Marijuana, Methamphetamines      Past medical history, past surgical history, medications, allergies, family history, and social history were reviewed with the patient. No additional pertinent items.       Review of Systems   Constitutional: Negative for fever.   HENT: Negative for congestion.    Eyes: Negative for redness.   Respiratory: Negative for shortness of breath.    Cardiovascular: Negative for chest pain.   Gastrointestinal: Positive for abdominal pain.        +black stool   Genitourinary: Negative for difficulty urinating.   Musculoskeletal: Negative for arthralgias and neck stiffness.   Skin: Negative for color change.   Neurological: Negative for headaches.   Psychiatric/Behavioral: Negative for confusion.   All other " "systems reviewed and are negative.    A complete review of systems was performed with pertinent positives and negatives noted in the HPI, and all other systems negative.    Physical Exam   BP: 120/79  Pulse: 95  Temp: 98.2  F (36.8  C)  Resp: 18  Height: 160 cm (5' 3\")  Weight: 63.9 kg (140 lb 12.8 oz)  SpO2: 100 %  Physical Exam  Vitals and nursing note reviewed.   Constitutional:       General: He is not in acute distress.     Appearance: He is well-developed. He is not ill-appearing, toxic-appearing or diaphoretic.   HENT:      Head: Normocephalic and atraumatic.      Mouth/Throat:      Lips: Pink.      Mouth: Mucous membranes are moist.      Pharynx: Oropharynx is clear. No oropharyngeal exudate.   Eyes:      General: Lids are normal. No scleral icterus.     Extraocular Movements: Extraocular movements intact.      Right eye: No nystagmus.      Left eye: No nystagmus.      Conjunctiva/sclera: Conjunctivae normal.      Pupils: Pupils are equal, round, and reactive to light.   Neck:      Thyroid: No thyromegaly.      Vascular: No JVD.      Trachea: No tracheal deviation.   Cardiovascular:      Rate and Rhythm: Normal rate and regular rhythm.      Pulses: Normal pulses.      Heart sounds: Normal heart sounds. No murmur heard.    No friction rub. No gallop.   Pulmonary:      Effort: Pulmonary effort is normal. No respiratory distress.      Breath sounds: Normal breath sounds.   Abdominal:      General: Bowel sounds are normal. There is no distension.      Palpations: Abdomen is soft. There is no mass.      Tenderness: There is no abdominal tenderness. There is no right CVA tenderness, left CVA tenderness, guarding or rebound.      Hernia: No hernia is present.   Musculoskeletal:         General: No tenderness. Normal range of motion.      Cervical back: Normal range of motion and neck supple. No erythema or rigidity.      Right lower leg: No edema.      Left lower leg: No edema.   Lymphadenopathy:      Cervical: No " cervical adenopathy.   Skin:     General: Skin is warm and dry.      Capillary Refill: Capillary refill takes less than 2 seconds.      Coloration: Skin is not pale.      Findings: No erythema or rash.   Neurological:      Mental Status: He is alert and oriented to person, place, and time.      Cranial Nerves: No cranial nerve deficit.      Sensory: No sensory deficit.      Motor: Motor function is intact.   Psychiatric:         Mood and Affect: Mood and affect normal.         Speech: Speech normal.         Behavior: Behavior normal.         ED Course     8:07 AM  The patient was seen and examined by Ruel Cloud MD in Room ED08.     Procedures                     Results for orders placed or performed during the hospital encounter of 12/15/22   CT Abdomen Pelvis w Contrast     Status: None    Narrative    EXAMINATION: CT ABDOMEN PELVIS W CONTRAST, 12/15/2022 1:45 PM    TECHNIQUE:  Helical CT images from the lung bases through the  symphysis pubis were obtained with IV contrast. Contrast dose:  iopamidol (ISOVUE-370) solution 86 mL    COMPARISON: Abdomen and pelvis CT 12/8/2022    HISTORY: Abdominal pain, evaluate for bowel obstruction    FINDINGS:    Lung bases: Patchy dependent basilar opacities, likely atelectasis..    Liver: Normal parenchymal attenuation without focal mass. Focal fatty  deposition along the falciform ligament  Biliary system: Calculus at the gallbladder neck. No wall thickening  or pericholecystic fluid. No intrahepatic or extrahepatic biliary  ductal dilatation.  Pancreas: No focal mass or dilation of the main pancreatic duct.  Spleen: Within normal limits.  Adrenal glands: Within normal limits.  Kidneys: No focal mass, hydronephrosis, or stone.  Bladder: Distended and otherwise unremarkable.  Reproductive organs: Penile prosthesis with pelvic reservoir abutting  the bladder. Prostatomegaly.  GI: There are multiple prominent jejunal loops with wall thickening.  No evidence of  obstruction. The colon is unremarkable.  Lymph nodes: No intra-abdominal or pelvic lymphadenopathy.  Vasculature: Patent. Mild vascular calcifications.    Bones and soft tissues: No suspicious soft tissue mass or fluid  collection. No suspicious osseous lesion. Chronic appearing wedge  deformities of T9 and T10      Impression    IMPRESSION:   1. Multiple thickened loops of jejunum, consistent with enteritis.  2. Cholelithiasis with gallstone at the gallbladder neck. No evidence  of acute cholecystitis.  3. Significant distention of the bladder.    I have personally reviewed the examination and initial interpretation  and I agree with the findings.    ALFREDA SHIPLEY MD         SYSTEM ID:  [id number]   Comprehensive metabolic panel     Status: Abnormal   Result Value Ref Range    Sodium 133 (L) 136 - 145 mmol/L    Potassium 3.9 3.4 - 5.3 mmol/L    Chloride 104 98 - 107 mmol/L    Carbon Dioxide (CO2) 21 (L) 22 - 29 mmol/L    Anion Gap 8 7 - 15 mmol/L    Urea Nitrogen 21.7 (H) 6.0 - 20.0 mg/dL    Creatinine 0.63 (L) 0.67 - 1.17 mg/dL    Calcium 8.9 8.6 - 10.0 mg/dL    Glucose 276 (H) 70 - 99 mg/dL    Alkaline Phosphatase 126 40 - 129 U/L    AST 13 10 - 50 U/L    ALT 13 10 - 50 U/L    Protein Total 6.5 6.4 - 8.3 g/dL    Albumin 3.7 3.5 - 5.2 g/dL    Bilirubin Total 0.6 <=1.2 mg/dL    GFR Estimate >90 >60 mL/min/1.73m2   Lipase     Status: Normal   Result Value Ref Range    Lipase 37 13 - 60 U/L   CBC with platelets and differential     Status: None   Result Value Ref Range    WBC Count 8.2 4.0 - 11.0 10e3/uL    RBC Count 4.46 4.40 - 5.90 10e6/uL    Hemoglobin 13.6 13.3 - 17.7 g/dL    Hematocrit 41.7 40.0 - 53.0 %    MCV 94 78 - 100 fL    MCH 30.5 26.5 - 33.0 pg    MCHC 32.6 31.5 - 36.5 g/dL    RDW 12.7 10.0 - 15.0 %    Platelet Count 334 150 - 450 10e3/uL    % Neutrophils 59 %    % Lymphocytes 30 %    % Monocytes 7 %    % Eosinophils 2 %    % Basophils 1 %    % Immature Granulocytes 1 %    NRBCs per 100 WBC 0 <1 /100     Absolute Neutrophils 4.9 1.6 - 8.3 10e3/uL    Absolute Lymphocytes 2.4 0.8 - 5.3 10e3/uL    Absolute Monocytes 0.5 0.0 - 1.3 10e3/uL    Absolute Eosinophils 0.2 0.0 - 0.7 10e3/uL    Absolute Basophils 0.1 0.0 - 0.2 10e3/uL    Absolute Immature Granulocytes 0.1 <=0.4 10e3/uL    Absolute NRBCs 0.0 10e3/uL   CBC with platelets differential     Status: None    Narrative    The following orders were created for panel order CBC with platelets differential.  Procedure                               Abnormality         Status                     ---------                               -----------         ------                     CBC with platelets and d...[[id number]]                      Final result                 Please view results for these tests on the individual orders.     Medications   0.9% sodium chloride BOLUS (0 mLs Intravenous Stopped 12/15/22 1205)   HYDROmorphone (DILAUDID) injection 1 mg (1 mg Intravenous Given 12/15/22 0934)   lidocaine (viscous) (XYLOCAINE) 2 % 15 mL, alum & mag hydroxide-simethicone (MAALOX) 15 mL GI Cocktail (30 mLs Oral Given 12/15/22 0931)   HYDROmorphone (PF) (DILAUDID) injection 0.5 mg (0.5 mg Intravenous Given 12/15/22 1227)   CT saline (72 mLs Intravenous Given 12/15/22 1330)   iopamidol (ISOVUE-370) solution 86 mL (86 mLs Intravenous Given 12/15/22 1330)        Assessments & Plan (with Medical Decision Making)     This patient presented to the emergency department complaining abdominal pain.  He has frequent visits with complaints of abdominal pain and had recently been admitted to the hospital with concerns for a partial small bowel obstruction.  He has been passing gas and his exam does not seem consistent with a bowel obstruction.  His abdominal exam is benign.  He is afebrile and and vital signs are within normal limits.  He does not have a significant leukocytosis.  No evidence of acute hepatobiliary or pancreatic process on blood work.  He was given Dilaudid and GI  cocktail and felt improved from this.  He then asked to try to eat something and when he did, his pain came back to a lesser extent.  He was given another dose of pain medication and CT of the abdomen and pelvis was obtained which demonstrated no evidence to suggest bowel obstruction or other acute intra-abdominal process.  Cholelithiasis is noted, but no signs of cholecystitis and no evidence on blood work to suggest an obstructive biliary process.  Patient was reevaluated and abdominal exam continues to be benign.  At this point, I feel comfortable discharging him and having him follow-up with his primary clinic.  He did request a prescription for Protonix and was discharged in good condition.    I have reviewed the nursing notes. I have reviewed the findings, diagnosis, plan and need for follow up with the patient.    Current Discharge Medication List          Final diagnoses:   Abdominal pain, epigastric     I, Toyin Louis, am serving as a trained medical scribe to document services personally performed by Ruel Cloud MD based on the provider's statements to me on December 15, 2022.  This document has been checked and approved by the attending provider.    I, Ruel Cloud MD, was physically present and have reviewed and verified the accuracy of this note documented by Toyin Louis, medical scribe.      --  Ruel MCCRAY [facility] EMERGENCY DEPARTMENT  12/15/2022     Ruel Cloud MD  12/15/22 [id number]

## 2022-12-15 NOTE — ED TRIAGE NOTES
"Patient presents to triage with c/o abdominal pain. Patient reports abdominal pain for two days. Denies N/V and fevers, but reports having diarrhea yesterday \"that was black.\"       "

## 2022-12-17 ENCOUNTER — APPOINTMENT (OUTPATIENT)
Dept: GENERAL RADIOLOGY | Facility: CLINIC | Age: 57
End: 2022-12-17
Attending: FAMILY MEDICINE
Payer: COMMERCIAL

## 2022-12-17 ENCOUNTER — HOSPITAL ENCOUNTER (EMERGENCY)
Facility: CLINIC | Age: 57
Discharge: HOME | End: 2022-12-17
Attending: FAMILY MEDICINE | Admitting: FAMILY MEDICINE
Payer: COMMERCIAL

## 2022-12-17 ENCOUNTER — NURSE TRIAGE (OUTPATIENT)
Dept: NURSING | Facility: CLINIC | Age: 57
End: 2022-12-17

## 2022-12-17 VITALS
OXYGEN SATURATION: 99 % | WEIGHT: 140 LBS | HEART RATE: 100 BPM | RESPIRATION RATE: 16 BRPM | SYSTOLIC BLOOD PRESSURE: 125 MMHG | HEIGHT: 68 IN | BODY MASS INDEX: 21.22 KG/M2 | DIASTOLIC BLOOD PRESSURE: 86 MMHG | TEMPERATURE: 99.8 F

## 2022-12-17 DIAGNOSIS — Z21 HUMAN IMMUNODEFICIENCY VIRUS I INFECTION (H): ICD-10-CM

## 2022-12-17 DIAGNOSIS — U07.1 INFECTION DUE TO 2019 NOVEL CORONAVIRUS: ICD-10-CM

## 2022-12-17 LAB
ALBUMIN SERPL BCG-MCNC: 3.8 G/DL (ref 3.5–5.2)
ALP SERPL-CCNC: 184 U/L (ref 40–129)
ALT SERPL W P-5'-P-CCNC: 17 U/L (ref 10–50)
ANION GAP SERPL CALCULATED.3IONS-SCNC: 17 MMOL/L (ref 7–15)
APTT PPP: 27 SECONDS (ref 22–38)
AST SERPL W P-5'-P-CCNC: 20 U/L (ref 10–50)
ATRIAL RATE - MUSE: 97 BPM
BASOPHILS # BLD AUTO: 0.1 10E3/UL (ref 0–0.2)
BASOPHILS NFR BLD AUTO: 1 %
BILIRUB SERPL-MCNC: 0.3 MG/DL
BUN SERPL-MCNC: 13.3 MG/DL (ref 6–20)
CALCIUM SERPL-MCNC: 9.5 MG/DL (ref 8.6–10)
CHLORIDE SERPL-SCNC: 99 MMOL/L (ref 98–107)
CREAT SERPL-MCNC: 0.58 MG/DL (ref 0.67–1.17)
DEPRECATED HCO3 PLAS-SCNC: 18 MMOL/L (ref 22–29)
DIASTOLIC BLOOD PRESSURE - MUSE: NORMAL MMHG
EOSINOPHIL # BLD AUTO: 0.1 10E3/UL (ref 0–0.7)
EOSINOPHIL NFR BLD AUTO: 2 %
ERYTHROCYTE [DISTWIDTH] IN BLOOD BY AUTOMATED COUNT: 12 % (ref 10–15)
FLUAV RNA SPEC QL NAA+PROBE: NEGATIVE
FLUBV RNA RESP QL NAA+PROBE: NEGATIVE
GFR SERPL CREATININE-BSD FRML MDRD: >90 ML/MIN/1.73M2
GLUCOSE BLDC GLUCOMTR-MCNC: 274 MG/DL (ref 70–99)
GLUCOSE SERPL-MCNC: 399 MG/DL (ref 70–99)
HCT VFR BLD AUTO: 36.4 % (ref 40–53)
HGB BLD-MCNC: 12.5 G/DL (ref 13.3–17.7)
IMM GRANULOCYTES # BLD: 0.1 10E3/UL
IMM GRANULOCYTES NFR BLD: 1 %
INR PPP: 0.97 (ref 0.85–1.15)
INTERPRETATION ECG - MUSE: NORMAL
LIPASE SERPL-CCNC: 55 U/L (ref 13–60)
LYMPHOCYTES # BLD AUTO: 2 10E3/UL (ref 0.8–5.3)
LYMPHOCYTES NFR BLD AUTO: 23 %
MCH RBC QN AUTO: 30.9 PG (ref 26.5–33)
MCHC RBC AUTO-ENTMCNC: 34.3 G/DL (ref 31.5–36.5)
MCV RBC AUTO: 90 FL (ref 78–100)
MONOCYTES # BLD AUTO: 0.6 10E3/UL (ref 0–1.3)
MONOCYTES NFR BLD AUTO: 7 %
NEUTROPHILS # BLD AUTO: 5.6 10E3/UL (ref 1.6–8.3)
NEUTROPHILS NFR BLD AUTO: 66 %
NRBC # BLD AUTO: 0 10E3/UL
NRBC BLD AUTO-RTO: 0 /100
NT-PROBNP SERPL-MCNC: 32 PG/ML (ref 0–900)
P AXIS - MUSE: 16 DEGREES
PLATELET # BLD AUTO: 323 10E3/UL (ref 150–450)
POTASSIUM SERPL-SCNC: 3.6 MMOL/L (ref 3.4–5.3)
PR INTERVAL - MUSE: 140 MS
PROT SERPL-MCNC: 6.6 G/DL (ref 6.4–8.3)
QRS DURATION - MUSE: 92 MS
QT - MUSE: 340 MS
QTC - MUSE: 431 MS
R AXIS - MUSE: 74 DEGREES
RBC # BLD AUTO: 4.04 10E6/UL (ref 4.4–5.9)
RSV RNA SPEC NAA+PROBE: NEGATIVE
SARS-COV-2 RNA RESP QL NAA+PROBE: POSITIVE
SODIUM SERPL-SCNC: 134 MMOL/L (ref 136–145)
SYSTOLIC BLOOD PRESSURE - MUSE: NORMAL MMHG
T AXIS - MUSE: 67 DEGREES
TROPONIN T SERPL HS-MCNC: 8 NG/L
VENTRICULAR RATE- MUSE: 97 BPM
WBC # BLD AUTO: 8.5 10E3/UL (ref 4–11)

## 2022-12-17 PROCEDURE — 83880 ASSAY OF NATRIURETIC PEPTIDE: CPT | Performed by: FAMILY MEDICINE

## 2022-12-17 PROCEDURE — 250N000013 HC RX MED GY IP 250 OP 250 PS 637: Performed by: FAMILY MEDICINE

## 2022-12-17 PROCEDURE — 96360 HYDRATION IV INFUSION INIT: CPT | Performed by: FAMILY MEDICINE

## 2022-12-17 PROCEDURE — 87637 SARSCOV2&INF A&B&RSV AMP PRB: CPT | Performed by: FAMILY MEDICINE

## 2022-12-17 PROCEDURE — 93010 ELECTROCARDIOGRAM REPORT: CPT | Performed by: FAMILY MEDICINE

## 2022-12-17 PROCEDURE — 87637 SARSCOV2&INF A&B&RSV AMP PRB: CPT | Mod: 59 | Performed by: FAMILY MEDICINE

## 2022-12-17 PROCEDURE — 80053 COMPREHEN METABOLIC PANEL: CPT | Performed by: FAMILY MEDICINE

## 2022-12-17 PROCEDURE — 84484 ASSAY OF TROPONIN QUANT: CPT | Performed by: FAMILY MEDICINE

## 2022-12-17 PROCEDURE — 94640 AIRWAY INHALATION TREATMENT: CPT | Performed by: FAMILY MEDICINE

## 2022-12-17 PROCEDURE — 36415 COLL VENOUS BLD VENIPUNCTURE: CPT | Performed by: FAMILY MEDICINE

## 2022-12-17 PROCEDURE — 85730 THROMBOPLASTIN TIME PARTIAL: CPT | Performed by: FAMILY MEDICINE

## 2022-12-17 PROCEDURE — 96361 HYDRATE IV INFUSION ADD-ON: CPT | Performed by: FAMILY MEDICINE

## 2022-12-17 PROCEDURE — 250N000009 HC RX 250: Performed by: FAMILY MEDICINE

## 2022-12-17 PROCEDURE — 85610 PROTHROMBIN TIME: CPT | Performed by: FAMILY MEDICINE

## 2022-12-17 PROCEDURE — C9803 HOPD COVID-19 SPEC COLLECT: HCPCS | Performed by: FAMILY MEDICINE

## 2022-12-17 PROCEDURE — 93005 ELECTROCARDIOGRAM TRACING: CPT | Performed by: FAMILY MEDICINE

## 2022-12-17 PROCEDURE — 99285 EMERGENCY DEPT VISIT HI MDM: CPT | Mod: 25 | Performed by: FAMILY MEDICINE

## 2022-12-17 PROCEDURE — 82040 ASSAY OF SERUM ALBUMIN: CPT | Performed by: FAMILY MEDICINE

## 2022-12-17 PROCEDURE — 258N000003 HC RX IP 258 OP 636: Performed by: FAMILY MEDICINE

## 2022-12-17 PROCEDURE — 85025 COMPLETE CBC W/AUTO DIFF WBC: CPT | Performed by: FAMILY MEDICINE

## 2022-12-17 PROCEDURE — 87040 BLOOD CULTURE FOR BACTERIA: CPT | Performed by: FAMILY MEDICINE

## 2022-12-17 PROCEDURE — 83690 ASSAY OF LIPASE: CPT | Performed by: FAMILY MEDICINE

## 2022-12-17 PROCEDURE — 71046 X-RAY EXAM CHEST 2 VIEWS: CPT | Mod: 26 | Performed by: RADIOLOGY

## 2022-12-17 PROCEDURE — 71046 X-RAY EXAM CHEST 2 VIEWS: CPT

## 2022-12-17 RX ORDER — LIDOCAINE 40 MG/G
CREAM TOPICAL
Status: DISCONTINUED | OUTPATIENT
Start: 2022-12-17 | End: 2022-12-17 | Stop reason: HOSPADM

## 2022-12-17 RX ORDER — ALBUTEROL SULFATE 90 UG/1
2 AEROSOL, METERED RESPIRATORY (INHALATION) EVERY 6 HOURS PRN
Qty: 18 G | Refills: 0 | Status: SHIPPED | OUTPATIENT
Start: 2022-12-17 | End: 2023-01-25

## 2022-12-17 RX ORDER — IPRATROPIUM BROMIDE AND ALBUTEROL SULFATE 2.5; .5 MG/3ML; MG/3ML
3 SOLUTION RESPIRATORY (INHALATION) ONCE
Status: COMPLETED | OUTPATIENT
Start: 2022-12-17 | End: 2022-12-17

## 2022-12-17 RX ORDER — NIRMATRELVIR AND RITONAVIR 300-100 MG
3 KIT ORAL 2 TIMES DAILY
Qty: 30 EACH | Refills: 0 | Status: SHIPPED | OUTPATIENT
Start: 2022-12-17 | End: 2022-12-27

## 2022-12-17 RX ORDER — ACETAMINOPHEN 500 MG
1000 TABLET ORAL ONCE
Status: COMPLETED | OUTPATIENT
Start: 2022-12-17 | End: 2022-12-17

## 2022-12-17 RX ADMIN — LIDOCAINE HYDROCHLORIDE 30 ML: 20 SOLUTION ORAL; TOPICAL at 08:46

## 2022-12-17 RX ADMIN — SODIUM CHLORIDE 1000 ML: 9 INJECTION, SOLUTION INTRAVENOUS at 12:01

## 2022-12-17 RX ADMIN — IPRATROPIUM BROMIDE AND ALBUTEROL SULFATE 3 ML: 2.5; .5 SOLUTION RESPIRATORY (INHALATION) at 08:27

## 2022-12-17 RX ADMIN — ACETAMINOPHEN 1000 MG: 500 TABLET ORAL at 14:15

## 2022-12-17 ASSESSMENT — ENCOUNTER SYMPTOMS
FATIGUE: 1
DYSURIA: 0
DECREASED CONCENTRATION: 1
BACK PAIN: 0
FEVER: 1
CHILLS: 1
NAUSEA: 0
APPETITE CHANGE: 1
SLEEP DISTURBANCE: 1
COUGH: 1
WEAKNESS: 1
TROUBLE SWALLOWING: 0
VOICE CHANGE: 0
SORE THROAT: 0
ABDOMINAL PAIN: 0
SHORTNESS OF BREATH: 1
PHOTOPHOBIA: 0
ACTIVITY CHANGE: 1
BRUISES/BLEEDS EASILY: 0
NERVOUS/ANXIOUS: 1
VOMITING: 0
DYSPHORIC MOOD: 0
CONFUSION: 0

## 2022-12-17 ASSESSMENT — ACTIVITIES OF DAILY LIVING (ADL)
ADLS_ACUITY_SCORE: 35
ADLS_ACUITY_SCORE: 37

## 2022-12-17 NOTE — ED TRIAGE NOTES
EMS states PT was seen here yesterday and dx with bowel obstruction and PNA. PT  per EMS. EMS states PT has a cough and increased weakness.

## 2022-12-17 NOTE — ED PROVIDER NOTES
ED Provider Note  [facility]      History     Chief Complaint   Patient presents with     Cough     Heartburn     HPI  Andrew Lockwood is a 55 year old male who has history of HIV GERD diabetes prior CNS toxoplasmosis multiple bowel obstructions GERD constipation generalized anxiety disorder who was seen 2 days ago in the ER with work-up for abdominal pain with cholelithiasis with GERD but no obstruction and some enteritis findings.  Patient states he has now started yesterday cough with fever some GERD symptoms also headache also denies abdominal pain no nausea vomiting at this point.  Now presents here feeling uncomfortable.  No true chest pain with this at all.  No hemoptysis reported.  Other history limited.    Past Medical History  Past Medical History:   Diagnosis Date     AIDS (H)      Allergic rhinitis due to other allergen     DNS     Anal dysplasia      Chronic abdominal pain      CNS toxoplasmosis (H)      Diabetes type 2, controlled (H)      GERD (gastroesophageal reflux disease)      History of seizure      History of substance abuse (H)      HIV (human immunodeficiency virus infection) (H)      HLD (hyperlipidemia)      Lung nodules      Periungual wart      PTSD (post-traumatic stress disorder)      Sleep apnea     doesn't use CPAP     Past Surgical History:   Procedure Laterality Date     ANOSCOPY N/A 9/9/2020    Procedure: Exam Under Anesthesia, ANOSCOPY, fulgeration of rectal fissures with Rectal Biopsies;  Surgeon: Thanh Lundberg MD;  [facility]     COLONOSCOPY Left 1/22/2016    Procedure: COMBINED COLONOSCOPY, SINGLE OR MULTIPLE BIOPSY/POLYPECTOMY BY BIOPSY;  Surgeon: Clark Saini MD;  [facility]     ESOPHAGOSCOPY, GASTROSCOPY, DUODENOSCOPY (EGD), COMBINED N/A 6/6/2022    Procedure: ESOPHAGOGASTRODUODENOSCOPY, WITH BIOPSY;  Surgeon: Kecia Benítez MD;  Location:  GI     HC EXPLORE UNDESC TESTIS,INGUIN/SCROTAL       LAPAROSCOPIC APPENDECTOMY N/A  "1/31/2018    Procedure: LAPAROSCOPIC APPENDECTOMY;  LAPAROSCOPIC APPENDECTOMY;  Surgeon: Dawn Holt MD;  [facility]     LAPAROSCOPY DIAGNOSTIC (GENERAL) N/A 7/26/2016    Procedure: LAPAROSCOPY DIAGNOSTIC (GENERAL);  Surgeon: Susannah Arriaga MD;  [facility]     LAPAROSCOPY DIAGNOSTIC (GENERAL) N/A 4/16/2018    Procedure: LAPAROSCOPY DIAGNOSTIC (GENERAL);  Diagnostic laparoscopy and lysis of adhesions;  Surgeon: Prince Dowling MD;  [facility]     OPTICAL TRACKING SYSTEM CRANIOTOMY, EXCISE TUMOR, COMBINED Left 4/10/2015    Procedure: COMBINED OPTICAL TRACKING SYSTEM CRANIOTOMY, EXCISE TUMOR;  Surgeon: Mirlande Colmenares MD;  [facility]     REPAIR GAMEKEEPER'S THUMB Right 12/2/2016    Procedure: REPAIR LIGAMENT ULNAR COLLATERAL THUMB (GAMEKEEPER'S);  Surgeon: Evin Zamorano MD;  [facility]     ZZC NONSPECIFIC PROCEDURE      right forearm fracture     albuterol (PROAIR HFA/PROVENTIL HFA/VENTOLIN HFA) 108 (90 Base) MCG/ACT inhaler  nirmatrelvir and ritonavir (PAXLOVID, 300/100,) therapy pack  Alcohol Swabs PADS  bictegravir-emtricitabine-tenofovir (BIKTARVY) -25 MG per tablet  Gauze Pads & Dressings (GAUZE PADS 4\"X4\") 4\"X4\" PADS  insulin detemir (LEVEMIR PEN) 100 UNIT/ML pen  insulin pen needle (32G X 4 MM) 32G X 4 MM miscellaneous  metFORMIN (GLUCOPHAGE XR) 500 MG 24 hr tablet  mupirocin (BACTROBAN) 2 % external ointment  pantoprazole (PROTONIX) 20 MG EC tablet  sterile water, bottle, irrigation      Allergies   Allergen Reactions     Fentanyl Blisters     Per pt, after taking this medication had blisters develope     Tylenol [Acetaminophen] Itching     Dulaglutide Rash     Insulin Lispro Rash     Patient reported     Penicillin V Other (See Comments) and Rash     Diffuse maculopapular rash + feels \"high\", per pt.      Family History  Family History   Problem Relation Age of Onset     Diabetes Brother      Diabetes Father      Alzheimer Disease Father  " "    Unknown/Adopted Mother      Diabetes Paternal Grandfather      Cancer No family hx of         no skin cancer     Skin Cancer No family hx of         no famiy hx of skin cancer     Glaucoma No family hx of      Macular Degeneration No family hx of      Social History   Social History     Tobacco Use     Smoking status: Some Days     Packs/day: 0.25     Years: 40.00     Pack years: 10.00     Types: Cigarettes     Smokeless tobacco: Former   Substance Use Topics     Alcohol use: No     Alcohol/week: 0.0 standard drinks     Comment: Last etoh in 2007     Drug use: Not Currently     Types: Marijuana, Methamphetamines      Past medical history, past surgical history, medications, allergies, family history, and social history were reviewed with the patient. No additional pertinent items.       Review of Systems   Constitutional: Positive for activity change, appetite change, chills, fatigue and fever.   HENT: Negative for sore throat, trouble swallowing and voice change.    Eyes: Negative for photophobia and visual disturbance.   Respiratory: Positive for cough and shortness of breath.    Cardiovascular: Negative for chest pain.   Gastrointestinal: Negative for abdominal pain, nausea and vomiting.   Genitourinary: Negative for dysuria.   Musculoskeletal: Negative for back pain.   Skin: Negative for rash.   Neurological: Positive for weakness (general).   Hematological: Does not bruise/bleed easily.   Psychiatric/Behavioral: Positive for decreased concentration and sleep disturbance. Negative for confusion and dysphoric mood. The patient is nervous/anxious.    All other systems reviewed and are negative.    A complete review of systems was performed with pertinent positives and negatives noted in the HPI, and all other systems negative.         Physical Exam   BP: 131/80  Pulse: 100  Temp: 100  F (37.8  C)  Resp: 16  Height: 172.7 cm (5' 8\")  Weight: 63.5 kg (140 lb)  SpO2: 98 %  Physical Exam  Vitals and nursing note " reviewed.   Constitutional:       General: He is in acute distress.      Appearance: He is well-developed. He is not toxic-appearing or diaphoretic.      Comments: Patient vitally stable low-grade temperature otherwise coughing intermittently.  Ox saturation 90% room air.   HENT:      Head: Normocephalic and atraumatic.      Nose: Congestion present.      Mouth/Throat:      Mouth: Mucous membranes are moist.   Eyes:      General: No scleral icterus.     Extraocular Movements: Extraocular movements intact.      Conjunctiva/sclera: Conjunctivae normal.      Pupils: Pupils are equal, round, and reactive to light.   Neck:      Comments: Trachea is midline  Cardiovascular:      Rate and Rhythm: Normal rate and regular rhythm.   Pulmonary:      Effort: Respiratory distress present.      Breath sounds: No wheezing or rales.   Abdominal:      General: There is no distension.      Palpations: Abdomen is soft.      Tenderness: There is no abdominal tenderness. There is no guarding.      Comments: Negative Mendes sign no marked tenderness rebound or guarding rigidity   Musculoskeletal:         General: No swelling or tenderness.      Cervical back: Normal range of motion and neck supple. No rigidity.   Skin:     General: Skin is warm and dry.      Capillary Refill: Capillary refill takes less than 2 seconds.      Coloration: Skin is not jaundiced or pale.      Findings: No rash.   Neurological:      General: No focal deficit present.      Mental Status: He is alert and oriented to person, place, and time. Mental status is at baseline.   Psychiatric:      Comments: Patient slightly anxious otherwise appropriate         ED Course         Patient evaluated for infectious causes we will check again for COVID RSV and influenza.  Also will recheck labs he does have some GERD symptoms.  Overall abdomen benign we will recheck lipase along with liver function test to see if that needs to be pursued with his cholelithiasis recently  seen.  Will get chest x-ray EKG etc.  IV fluids DuoNeb and reassess.  Did well for patient to get an IV established as he has difficult access.  Finally did have 1 he was placed in room given liter of fluid here in the ER.  Vitally is remained stable oxygen saturations on room air 99 to 100%.    Patient received Tylenol orally also for headache fever.    Sodium 134 potassium 3.6.  Bicarb is 18 gap is 17 creatinine is 0.58.  Glucose is 399 initially repeat 247.  Liver function test normal limits.  White count 8.5 hemoglobin 12.5.  Platelets are 323.    EKG done without hyperacute changes.  Troponin is negative at 8 BNP is 32.  COVID-positive testing.    Chest x-ray done without acute infiltrates otherwise noted.    Patient resting comfortably here in the ER did receive a DuoNeb also.    I talked to infectious disease also reviewed patient's medications with pharmacy and felt paxlovid would be appropriate patient renal function is normal also standard dosing given.  Inform patient about this did write prescription for albuterol inhaler along with paxlovid normal dosing for 5 days.    Patient then to be discharged    Patient agrees with plan.    Procedures            EKG Interpretation:      Interpreted by Sachin Rader MD  Time reviewed: 755  Symptoms at time of EKG: cough and sob   Rhythm: normal sinus   Rate: normal  Axis: normal  Ectopy: none  Conduction: normal  ST Segments/ T Waves: No ST-T wave changes  Q Waves: none  Comparison to prior: No old EKG available    Clinical Impression: normal EKG                    Results for orders placed or performed during the hospital encounter of 12/17/22   XR Chest 2 Views     Status: None    Narrative    Exam: XR CHEST 2 VIEWS, 12/17/2022 10:19 AM    Indication: cough and fever    Comparison: 12/8/2022    Findings:   The cardiomediastinal silhouette and pulmonary vasculature are within  normal limits. No pleural effusion or pneumothorax. No focal airspace  opacity.       Impression    Impression: No acute airspace disease.    JOSE MCCORMICK DO ZAHRAA         SYSTEM ID:  [id number]   Partial thromboplastin time     Status: Normal   Result Value Ref Range    aPTT 27 22 - 38 Seconds   INR     Status: Normal   Result Value Ref Range    INR 0.97 0.85 - 1.15   Comprehensive metabolic panel     Status: Abnormal   Result Value Ref Range    Sodium 134 (L) 136 - 145 mmol/L    Potassium 3.6 3.4 - 5.3 mmol/L    Chloride 99 98 - 107 mmol/L    Carbon Dioxide (CO2) 18 (L) 22 - 29 mmol/L    Anion Gap 17 (H) 7 - 15 mmol/L    Urea Nitrogen 13.3 6.0 - 20.0 mg/dL    Creatinine 0.58 (L) 0.67 - 1.17 mg/dL    Calcium 9.5 8.6 - 10.0 mg/dL    Glucose 399 (H) 70 - 99 mg/dL    Alkaline Phosphatase 184 (H) 40 - 129 U/L    AST 20 10 - 50 U/L    ALT 17 10 - 50 U/L    Protein Total 6.6 6.4 - 8.3 g/dL    Albumin 3.8 3.5 - 5.2 g/dL    Bilirubin Total 0.3 <=1.2 mg/dL    GFR Estimate >90 >60 mL/min/1.73m2   Lipase     Status: Normal   Result Value Ref Range    Lipase 55 13 - 60 U/L   Troponin T, High Sensitivity     Status: Normal   Result Value Ref Range    Troponin T, High Sensitivity 8 <=22 ng/L   Nt probnp inpatient (BNP)     Status: Normal   Result Value Ref Range    N terminal Pro BNP Inpatient 32 0 - 900 pg/mL   Symptomatic Influenza A/B & SARS-CoV2 (COVID-19) Virus PCR Multiplex Nasopharyngeal     Status: Abnormal    Specimen: Nasopharyngeal; Swab   Result Value Ref Range    Influenza A PCR Negative Negative    Influenza B PCR Negative Negative    RSV PCR Negative Negative    SARS CoV2 PCR Positive (A) Negative    Narrative    Testing was performed using the Xpert Xpress CoV2/Flu/RSV Assay on the Cepheid GeneXpert Instrument. This test should be ordered for the detection of SARS-CoV-2 and influenza viruses in individuals who meet clinical and/or epidemiological criteria. Test performance is unknown in asymptomatic patients. This test is for in vitro diagnostic use under the FDA EUA for laboratories certified under  CLIA to perform high or moderate complexity testing. This test has not been FDA cleared or approved. A negative result does not rule out the presence of PCR inhibitors in the specimen or target RNA in concentration below the limit of detection for the assay. If only one viral target is positive but coinfection with multiple targets is suspected, the sample should be re-tested with another FDA cleared, approved, or authorized test, if coinfection would change clinical management. This test was validated by the [facility]. These laboratories are certified under the Clinical Laboratory Improvement Amendments of 1988 (CLIA-88) as qualified to perform high complexity laboratory testing.   CBC with platelets and differential     Status: Abnormal   Result Value Ref Range    WBC Count 8.5 4.0 - 11.0 10e3/uL    RBC Count 4.04 (L) 4.40 - 5.90 10e6/uL    Hemoglobin 12.5 (L) 13.3 - 17.7 g/dL    Hematocrit 36.4 (L) 40.0 - 53.0 %    MCV 90 78 - 100 fL    MCH 30.9 26.5 - 33.0 pg    MCHC 34.3 31.5 - 36.5 g/dL    RDW 12.0 10.0 - 15.0 %    Platelet Count 323 150 - 450 10e3/uL    % Neutrophils 66 %    % Lymphocytes 23 %    % Monocytes 7 %    % Eosinophils 2 %    % Basophils 1 %    % Immature Granulocytes 1 %    NRBCs per 100 WBC 0 <1 /100    Absolute Neutrophils 5.6 1.6 - 8.3 10e3/uL    Absolute Lymphocytes 2.0 0.8 - 5.3 10e3/uL    Absolute Monocytes 0.6 0.0 - 1.3 10e3/uL    Absolute Eosinophils 0.1 0.0 - 0.7 10e3/uL    Absolute Basophils 0.1 0.0 - 0.2 10e3/uL    Absolute Immature Granulocytes 0.1 <=0.4 10e3/uL    Absolute NRBCs 0.0 10e3/uL   Glucose by meter     Status: Abnormal   Result Value Ref Range    GLUCOSE BY METER POCT 274 (H) 70 - 99 mg/dL   EKG 12-lead, tracing only     Status: None   Result Value Ref Range    Systolic Blood Pressure  mmHg    Diastolic Blood Pressure  mmHg    Ventricular Rate 97 BPM    Atrial Rate 97 BPM    AL Interval 140 ms    QRS Duration 92 ms     ms    QTc 431 ms    P Axis 16  degrees    R AXIS 74 degrees    T Axis 67 degrees    Interpretation ECG       Sinus rhythm  Normal ECG  Unconfirmed report - interpretation of this ECG is computer generated - see medical record for final interpretation  Confirmed by - EMERGENCY ROOM, PHYSICIAN ([id number]),  AJAY NAJERA ([id number]) on 12/17/2022 11:11:50 AM     CBC with platelets differential     Status: Abnormal    Narrative    The following orders were created for panel order CBC with platelets differential.  Procedure                               Abnormality         Status                     ---------                               -----------         ------                     CBC with platelets and d...[[id number]]  Abnormal            Final result                 Please view results for these tests on the individual orders.     Medications   0.9% sodium chloride BOLUS (0 mLs Intravenous Stopped 12/17/22 1415)   ipratropium - albuterol 0.5 mg/2.5 mg/3 mL (DUONEB) neb solution 3 mL (3 mLs Nebulization Given 12/17/22 0827)   lidocaine (viscous) (XYLOCAINE) 2 % 15 mL, alum & mag hydroxide-simethicone (MAALOX) 15 mL GI Cocktail (30 mLs Oral Given 12/17/22 0846)   acetaminophen (TYLENOL) tablet 1,000 mg (1,000 mg Oral Given 12/17/22 1415)        Assessments & Plan (with Medical Decision Making)  55-year-old male history of HIV positive on Biktarvy presented the ER with 24 hours of cough fever some shortness of breath.  No significant chest pain.  No mopped assist.  Patient in the ER vitally stable otherwise low-grade fever.  EKG did not show hyperacute changes and cardiac work-up negative chest x-ray without infiltrates pneumothorax or effusion.  Patient's COVID test was positive.  Patient given IV fluids in the ER for mild acidosis changes on chemistries.  Patient able to eat blood sugar initially 399 rechecked after IV fluids to 47.  No sign of any concerning DKA etc.  Patient feeling better discussed with infectious disease also felt at this  point renal functions normal no interactions with his current medications can be prescribed Paxlovid patient been prescribed this along with albuterol inhaler following up with MD for recheck and return if any concerns. Home with oximeter also.       I have reviewed the nursing notes. I have reviewed the findings, diagnosis, plan and need for follow up with the patient.    Discharge Medication List as of 12/17/2022  2:56 PM      START taking these medications    Details   albuterol (PROAIR HFA/PROVENTIL HFA/VENTOLIN HFA) 108 (90 Base) MCG/ACT inhaler Inhale 2 puffs into the lungs every 6 hours as needed for shortness of breath, wheezing or cough, Disp-18 g, R-0, Local Print      nirmatrelvir and ritonavir (PAXLOVID, 300/100,) therapy pack Take 3 tablets by mouth 2 times daily for 5 days, Disp-30 each, R-0, Local PrintDate of symptom onset: 12/16/22; Risk criteria met: Yes; Positive Covid-19 test: Yes; Weight >40 kg Yes; Renal fxn: normal;  Drug-Drug interactions reviewed & addressed: Yes              Final diagnoses:   Infection due to 2019 novel coronavirus   Human immunodeficiency virus I infection (H)       --  Sachin Rader  [facility] EMERGENCY DEPARTMENT  12/17/2022    This note was created at least in part by the use of dragon voice dictation system. Inadvertent typographical errors may still exist.  Sachin Rader MD.    Patient evaluated in the emergency department during the COVID-19 pandemic period. Careful attention to patients safety was addressed throughout the evaluation. Evaluation and treatment management was initiated with disposition made efficiently and appropriate as possible to minimize any risk of potential exposure to patient during this evaluation.       Sachin Rader MD  12/17/22 1954

## 2022-12-17 NOTE — DISCHARGE INSTRUCTIONS
Home.  Your covid test was +  IV fluids given.  Heart tests look good.  Discussed with infectious disease and verified with pharmacy that you should take the Paxlovid for 5 days to shorten course and prevent need for hospitalization from covid.  Use the albuterol inhaler.  Tylenol for pain and fever.  Push fluids and rest.  Follow up with PMD and Infectious Disease.  Return if low oxygen levels or concerns.  Home with oximeter also.    Please make an appointment to follow up with Your Primary Care Provider and Infectious Disease Clinic (phone: [phone]) as soon as possible.

## 2022-12-17 NOTE — PROGRESS NOTES
Care Management Follow Up    Length of Stay (days): 0    Expected Discharge Date:  12/17/22     Concerns to be Addressed:     Concerns in this encounter.  Patient plan of care discussed at interdisciplinary rounds: No    Anticipated Discharge Disposition:  Home     Anticipated Discharge Services:  transportation  Anticipated Discharge DME:      Patient/family educated on Medicare website which has current facility and service quality ratings:  N/A  Education Provided on the Discharge Plan:  N/A  Patient/Family in Agreement with the Plan:  N/A    Referrals Placed by CM/SW:  None  Private pay costs discussed: Not applicable    Additional Information:  1506  SW spoke with bedside nurse. Pt will need a ride home. Pt is ready fr discharge. Nurse has verified address with pt.     SW called [employer] [phone] to set ride for pt.      will continue to follow for discharge planning and support and needed.     _______________________    FLORENCE Hope, LSW  ED/OBS   [facility]  Phone: [phone]  Pager: [phone]  Fax: [phone]     On-call pager, [phone], 4:00 pm to midnight

## 2022-12-18 NOTE — TELEPHONE ENCOUNTER
Leydi called with pt, pt gave verbal consent RN to speak to her regarding this medication. She stated pt was seen at he ER for Covid 19 and prescrition was sent to the pharmacy and they deosnt have, and it was sent to [facility] pharmacy. She wants paxlovid called in to the [facility] in [facility].         RN called [facility] in [facility] in [facility], and spoke with pharmacist Arlene and she was give verbal order of paxlovid.       nirmatrelvir and ritonavir (PAXLOVID, 300/100,) therapy pack 30 each 0 12/17/2022 12/22/2022 No   Sig - Route: Take 3 tablets by mouth 2 times daily for 5 days - Oral   Class: Local Print   Notes to Pharmacy: Date of symptom onset: 12/16/22; Risk criteria met: Yes; Positive Covid-19 test: Yes; Weight >40 kg Yes; Renal fxn: normal;  Drug-Drug interactions reviewed & addressed: Yes   Order: [id number]     Uziel Loaiza RN  [facility]     Reason for Disposition    [1] Prescription prescribed recently is not at pharmacy AND [2] triager has access to patient's EMR AND [3] prescription is recorded in the EMR    Protocols used: MEDICATION QUESTION CALL-A-

## 2022-12-22 LAB
BACTERIA BLD CULT: NO GROWTH
BACTERIA BLD CULT: NO GROWTH

## 2022-12-23 ENCOUNTER — APPOINTMENT (OUTPATIENT)
Dept: CT IMAGING | Facility: CLINIC | Age: 57
End: 2022-12-23
Attending: EMERGENCY MEDICINE
Payer: COMMERCIAL

## 2022-12-23 ENCOUNTER — HOSPITAL ENCOUNTER (INPATIENT)
Facility: CLINIC | Age: 57
LOS: 6 days | Discharge: HOME | End: 2022-12-29
Attending: EMERGENCY MEDICINE | Admitting: PEDIATRICS
Payer: COMMERCIAL

## 2022-12-23 DIAGNOSIS — K56.600 PARTIAL INTESTINAL OBSTRUCTION, UNSPECIFIED CAUSE (H): ICD-10-CM

## 2022-12-23 DIAGNOSIS — B20 HUMAN IMMUNODEFICIENCY VIRUS (HIV) DISEASE (H): ICD-10-CM

## 2022-12-23 DIAGNOSIS — U07.1 INFECTION DUE TO 2019 NOVEL CORONAVIRUS: ICD-10-CM

## 2022-12-23 DIAGNOSIS — U07.1 COVID-19: ICD-10-CM

## 2022-12-23 LAB
ALBUMIN SERPL BCG-MCNC: 4.3 G/DL (ref 3.5–5.2)
ALBUMIN UR-MCNC: NEGATIVE MG/DL
ALP SERPL-CCNC: 142 U/L (ref 40–129)
ALT SERPL W P-5'-P-CCNC: 14 U/L (ref 10–50)
ANION GAP SERPL CALCULATED.3IONS-SCNC: 13 MMOL/L (ref 7–15)
APPEARANCE UR: CLEAR
AST SERPL W P-5'-P-CCNC: 14 U/L (ref 10–50)
B-OH-BUTYR SERPL-MCNC: 0.2 MMOL/L
BASOPHILS # BLD AUTO: 0.1 10E3/UL (ref 0–0.2)
BASOPHILS NFR BLD AUTO: 1 %
BILIRUB SERPL-MCNC: 0.5 MG/DL
BILIRUB UR QL STRIP: NEGATIVE
BUN SERPL-MCNC: 24.3 MG/DL (ref 6–20)
CALCIUM SERPL-MCNC: 10.4 MG/DL (ref 8.6–10)
CHLORIDE SERPL-SCNC: 96 MMOL/L (ref 98–107)
COLOR UR AUTO: ABNORMAL
CREAT SERPL-MCNC: 0.67 MG/DL (ref 0.67–1.17)
DEPRECATED HCO3 PLAS-SCNC: 24 MMOL/L (ref 22–29)
EOSINOPHIL # BLD AUTO: 0.1 10E3/UL (ref 0–0.7)
EOSINOPHIL NFR BLD AUTO: 1 %
ERYTHROCYTE [DISTWIDTH] IN BLOOD BY AUTOMATED COUNT: 11.9 % (ref 10–15)
GFR SERPL CREATININE-BSD FRML MDRD: >90 ML/MIN/1.73M2
GLUCOSE SERPL-MCNC: 409 MG/DL (ref 70–99)
GLUCOSE UR STRIP-MCNC: >=1000 MG/DL
HCO3 BLDV-SCNC: 27 MMOL/L (ref 21–28)
HCT VFR BLD AUTO: 42.9 % (ref 40–53)
HGB BLD-MCNC: 14.7 G/DL (ref 13.3–17.7)
HGB UR QL STRIP: NEGATIVE
IMM GRANULOCYTES # BLD: 0.1 10E3/UL
IMM GRANULOCYTES NFR BLD: 1 %
INR PPP: 0.95 (ref 0.85–1.15)
KETONES UR STRIP-MCNC: NEGATIVE MG/DL
LACTATE BLD-SCNC: 1 MMOL/L
LACTATE SERPL-SCNC: 1.4 MMOL/L (ref 0.7–2)
LEUKOCYTE ESTERASE UR QL STRIP: NEGATIVE
LIPASE SERPL-CCNC: 65 U/L (ref 13–60)
LYMPHOCYTES # BLD AUTO: 2.4 10E3/UL (ref 0.8–5.3)
LYMPHOCYTES NFR BLD AUTO: 25 %
MCH RBC QN AUTO: 30.8 PG (ref 26.5–33)
MCHC RBC AUTO-ENTMCNC: 34.3 G/DL (ref 31.5–36.5)
MCV RBC AUTO: 90 FL (ref 78–100)
MONOCYTES # BLD AUTO: 0.5 10E3/UL (ref 0–1.3)
MONOCYTES NFR BLD AUTO: 5 %
MUCOUS THREADS #/AREA URNS LPF: PRESENT /LPF
NEUTROPHILS # BLD AUTO: 6.5 10E3/UL (ref 1.6–8.3)
NEUTROPHILS NFR BLD AUTO: 67 %
NITRATE UR QL: NEGATIVE
NRBC # BLD AUTO: 0 10E3/UL
NRBC BLD AUTO-RTO: 0 /100
PCO2 BLDV: 44 MM HG (ref 40–50)
PH BLDV: 7.39 [PH] (ref 7.32–7.43)
PH UR STRIP: 5.5 [PH] (ref 5–7)
PLATELET # BLD AUTO: 457 10E3/UL (ref 150–450)
PO2 BLDV: 49 MM HG (ref 25–47)
POTASSIUM SERPL-SCNC: 4.4 MMOL/L (ref 3.4–5.3)
PROT SERPL-MCNC: 7.7 G/DL (ref 6.4–8.3)
RBC # BLD AUTO: 4.77 10E6/UL (ref 4.4–5.9)
RBC URINE: 0 /HPF
SAO2 % BLDV: 84 % (ref 94–100)
SODIUM SERPL-SCNC: 133 MMOL/L (ref 136–145)
SP GR UR STRIP: 1.02 (ref 1–1.03)
TROPONIN T SERPL HS-MCNC: 7 NG/L
TROPONIN T SERPL HS-MCNC: 7 NG/L
UROBILINOGEN UR STRIP-MCNC: NORMAL MG/DL
WBC # BLD AUTO: 9.8 10E3/UL (ref 4–11)
WBC URINE: <1 /HPF

## 2022-12-23 PROCEDURE — 250N000009 HC RX 250: Performed by: EMERGENCY MEDICINE

## 2022-12-23 PROCEDURE — 999N000285 HC STATISTIC VASC ACCESS LAB DRAW WITH PIV START

## 2022-12-23 PROCEDURE — 74177 CT ABD & PELVIS W/CONTRAST: CPT

## 2022-12-23 PROCEDURE — 84484 ASSAY OF TROPONIN QUANT: CPT | Performed by: EMERGENCY MEDICINE

## 2022-12-23 PROCEDURE — 83605 ASSAY OF LACTIC ACID: CPT

## 2022-12-23 PROCEDURE — 250N000013 HC RX MED GY IP 250 OP 250 PS 637: Performed by: EMERGENCY MEDICINE

## 2022-12-23 PROCEDURE — 85025 COMPLETE CBC W/AUTO DIFF WBC: CPT | Performed by: EMERGENCY MEDICINE

## 2022-12-23 PROCEDURE — 250N000011 HC RX IP 250 OP 636: Performed by: EMERGENCY MEDICINE

## 2022-12-23 PROCEDURE — 87799 DETECT AGENT NOS DNA QUANT: CPT

## 2022-12-23 PROCEDURE — 85610 PROTHROMBIN TIME: CPT | Performed by: EMERGENCY MEDICINE

## 2022-12-23 PROCEDURE — 82010 KETONE BODYS QUAN: CPT | Performed by: EMERGENCY MEDICINE

## 2022-12-23 PROCEDURE — 96361 HYDRATE IV INFUSION ADD-ON: CPT | Performed by: EMERGENCY MEDICINE

## 2022-12-23 PROCEDURE — 120N000002 HC R&B MED SURG/OB UMMC

## 2022-12-23 PROCEDURE — 83690 ASSAY OF LIPASE: CPT | Performed by: EMERGENCY MEDICINE

## 2022-12-23 PROCEDURE — 80053 COMPREHEN METABOLIC PANEL: CPT | Performed by: EMERGENCY MEDICINE

## 2022-12-23 PROCEDURE — 93010 ELECTROCARDIOGRAM REPORT: CPT | Performed by: EMERGENCY MEDICINE

## 2022-12-23 PROCEDURE — 250N000011 HC RX IP 250 OP 636: Performed by: STUDENT IN AN ORGANIZED HEALTH CARE EDUCATION/TRAINING PROGRAM

## 2022-12-23 PROCEDURE — 82803 BLOOD GASES ANY COMBINATION: CPT

## 2022-12-23 PROCEDURE — 999N000128 HC STATISTIC PERIPHERAL IV START W/O US GUIDANCE

## 2022-12-23 PROCEDURE — 81001 URINALYSIS AUTO W/SCOPE: CPT | Performed by: EMERGENCY MEDICINE

## 2022-12-23 PROCEDURE — 83605 ASSAY OF LACTIC ACID: CPT | Performed by: EMERGENCY MEDICINE

## 2022-12-23 PROCEDURE — 74177 CT ABD & PELVIS W/CONTRAST: CPT | Mod: 26 | Performed by: RADIOLOGY

## 2022-12-23 PROCEDURE — 36415 COLL VENOUS BLD VENIPUNCTURE: CPT | Performed by: EMERGENCY MEDICINE

## 2022-12-23 PROCEDURE — 258N000003 HC RX IP 258 OP 636: Performed by: EMERGENCY MEDICINE

## 2022-12-23 PROCEDURE — 93005 ELECTROCARDIOGRAM TRACING: CPT | Performed by: EMERGENCY MEDICINE

## 2022-12-23 PROCEDURE — 96374 THER/PROPH/DIAG INJ IV PUSH: CPT | Mod: 59 | Performed by: EMERGENCY MEDICINE

## 2022-12-23 PROCEDURE — 99285 EMERGENCY DEPT VISIT HI MDM: CPT | Mod: 25 | Performed by: EMERGENCY MEDICINE

## 2022-12-23 RX ORDER — HYDROMORPHONE HYDROCHLORIDE 1 MG/ML
0.3 INJECTION, SOLUTION INTRAMUSCULAR; INTRAVENOUS; SUBCUTANEOUS ONCE
Status: COMPLETED | OUTPATIENT
Start: 2022-12-23 | End: 2022-12-23

## 2022-12-23 RX ORDER — IOPAMIDOL 755 MG/ML
86 INJECTION, SOLUTION INTRAVASCULAR ONCE
Status: COMPLETED | OUTPATIENT
Start: 2022-12-23 | End: 2022-12-23

## 2022-12-23 RX ADMIN — HYDROMORPHONE HYDROCHLORIDE 0.3 MG: 1 INJECTION, SOLUTION INTRAMUSCULAR; INTRAVENOUS; SUBCUTANEOUS at 19:51

## 2022-12-23 RX ADMIN — SODIUM CHLORIDE, POTASSIUM CHLORIDE, SODIUM LACTATE AND CALCIUM CHLORIDE 500 ML: 600; 310; 30; 20 INJECTION, SOLUTION INTRAVENOUS at 19:54

## 2022-12-23 RX ADMIN — IOPAMIDOL 86 ML: 755 INJECTION, SOLUTION INTRAVENOUS at 22:03

## 2022-12-23 RX ADMIN — ALUMINUM HYDROXIDE, MAGNESIUM HYDROXIDE, AND SIMETHICONE 30 ML: 200; 200; 20 SUSPENSION ORAL at 22:19

## 2022-12-23 ASSESSMENT — ENCOUNTER SYMPTOMS
BACK PAIN: 0
LIGHT-HEADEDNESS: 0
VOMITING: 1
ABDOMINAL PAIN: 1
NECK STIFFNESS: 0
FEVER: 0
CHILLS: 0
NAUSEA: 1
ABDOMINAL DISTENTION: 0
SHORTNESS OF BREATH: 0
HEADACHES: 0
DIZZINESS: 0
COLOR CHANGE: 0
COUGH: 0
NECK PAIN: 0

## 2022-12-23 ASSESSMENT — ACTIVITIES OF DAILY LIVING (ADL)
ADLS_ACUITY_SCORE: 35
ADLS_ACUITY_SCORE: 37
ADLS_ACUITY_SCORE: 37

## 2022-12-24 LAB
ALBUMIN SERPL BCG-MCNC: 3.7 G/DL (ref 3.5–5.2)
ALP SERPL-CCNC: 106 U/L (ref 40–129)
ALT SERPL W P-5'-P-CCNC: 12 U/L (ref 10–50)
ANION GAP SERPL CALCULATED.3IONS-SCNC: 12 MMOL/L (ref 7–15)
AST SERPL W P-5'-P-CCNC: 12 U/L (ref 10–50)
ATRIAL RATE - MUSE: 90 BPM
BILIRUB SERPL-MCNC: 0.5 MG/DL
BUN SERPL-MCNC: 25.6 MG/DL (ref 6–20)
C COLI+JEJUNI+LARI FUSA STL QL NAA+PROBE: NOT DETECTED
C DIFF TOX B STL QL: NEGATIVE
CALCIUM SERPL-MCNC: 9.6 MG/DL (ref 8.6–10)
CHLORIDE SERPL-SCNC: 99 MMOL/L (ref 98–107)
CMV DNA SPEC NAA+PROBE-ACNC: NOT DETECTED IU/ML
CREAT SERPL-MCNC: 0.7 MG/DL (ref 0.67–1.17)
DEPRECATED HCO3 PLAS-SCNC: 20 MMOL/L (ref 22–29)
DIASTOLIC BLOOD PRESSURE - MUSE: NORMAL MMHG
EC STX1 GENE STL QL NAA+PROBE: NOT DETECTED
EC STX2 GENE STL QL NAA+PROBE: NOT DETECTED
ERYTHROCYTE [DISTWIDTH] IN BLOOD BY AUTOMATED COUNT: 12 % (ref 10–15)
GFR SERPL CREATININE-BSD FRML MDRD: >90 ML/MIN/1.73M2
GLUCOSE BLDC GLUCOMTR-MCNC: 245 MG/DL (ref 70–99)
GLUCOSE BLDC GLUCOMTR-MCNC: 299 MG/DL (ref 70–99)
GLUCOSE BLDC GLUCOMTR-MCNC: 314 MG/DL (ref 70–99)
GLUCOSE BLDC GLUCOMTR-MCNC: 337 MG/DL (ref 70–99)
GLUCOSE BLDC GLUCOMTR-MCNC: 389 MG/DL (ref 70–99)
GLUCOSE BLDC GLUCOMTR-MCNC: 397 MG/DL (ref 70–99)
GLUCOSE BLDC GLUCOMTR-MCNC: 414 MG/DL (ref 70–99)
GLUCOSE SERPL-MCNC: 370 MG/DL (ref 70–99)
HCT VFR BLD AUTO: 38.8 % (ref 40–53)
HGB BLD-MCNC: 13.2 G/DL (ref 13.3–17.7)
INTERPRETATION ECG - MUSE: NORMAL
MCH RBC QN AUTO: 30.6 PG (ref 26.5–33)
MCHC RBC AUTO-ENTMCNC: 34 G/DL (ref 31.5–36.5)
MCV RBC AUTO: 90 FL (ref 78–100)
NOROV GI+II ORF1-ORF2 JNC STL QL NAA+PR: NOT DETECTED
P AXIS - MUSE: 28 DEGREES
PLATELET # BLD AUTO: 420 10E3/UL (ref 150–450)
POTASSIUM SERPL-SCNC: 4.3 MMOL/L (ref 3.4–5.3)
PR INTERVAL - MUSE: 136 MS
PROT SERPL-MCNC: 6.6 G/DL (ref 6.4–8.3)
QRS DURATION - MUSE: 88 MS
QT - MUSE: 350 MS
QTC - MUSE: 428 MS
R AXIS - MUSE: -24 DEGREES
RBC # BLD AUTO: 4.32 10E6/UL (ref 4.4–5.9)
RVA NSP5 STL QL NAA+PROBE: NOT DETECTED
SALMONELLA SP RPOD STL QL NAA+PROBE: NOT DETECTED
SHIGELLA SP+EIEC IPAH STL QL NAA+PROBE: NOT DETECTED
SODIUM SERPL-SCNC: 131 MMOL/L (ref 136–145)
SYSTOLIC BLOOD PRESSURE - MUSE: NORMAL MMHG
T AXIS - MUSE: 9 DEGREES
V CHOL+PARA RFBL+TRKH+TNAA STL QL NAA+PR: NOT DETECTED
VENTRICULAR RATE- MUSE: 90 BPM
WBC # BLD AUTO: 10.8 10E3/UL (ref 4–11)
Y ENTERO RECN STL QL NAA+PROBE: NOT DETECTED

## 2022-12-24 PROCEDURE — 258N000003 HC RX IP 258 OP 636

## 2022-12-24 PROCEDURE — 999N000127 HC STATISTIC PERIPHERAL IV START W US GUIDANCE

## 2022-12-24 PROCEDURE — 80053 COMPREHEN METABOLIC PANEL: CPT

## 2022-12-24 PROCEDURE — 250N000012 HC RX MED GY IP 250 OP 636 PS 637

## 2022-12-24 PROCEDURE — 87506 IADNA-DNA/RNA PROBE TQ 6-11: CPT

## 2022-12-24 PROCEDURE — 250N000011 HC RX IP 250 OP 636: Performed by: EMERGENCY MEDICINE

## 2022-12-24 PROCEDURE — 99223 1ST HOSP IP/OBS HIGH 75: CPT | Mod: GC | Performed by: PEDIATRICS

## 2022-12-24 PROCEDURE — 85014 HEMATOCRIT: CPT

## 2022-12-24 PROCEDURE — 87493 C DIFF AMPLIFIED PROBE: CPT

## 2022-12-24 PROCEDURE — 120N000002 HC R&B MED SURG/OB UMMC

## 2022-12-24 PROCEDURE — 250N000009 HC RX 250

## 2022-12-24 PROCEDURE — 250N000011 HC RX IP 250 OP 636

## 2022-12-24 PROCEDURE — 250N000011 HC RX IP 250 OP 636: Performed by: PEDIATRICS

## 2022-12-24 PROCEDURE — 258N000003 HC RX IP 258 OP 636: Performed by: PEDIATRICS

## 2022-12-24 PROCEDURE — 36415 COLL VENOUS BLD VENIPUNCTURE: CPT

## 2022-12-24 PROCEDURE — 250N000013 HC RX MED GY IP 250 OP 250 PS 637

## 2022-12-24 RX ORDER — ONDANSETRON 4 MG/1
4 TABLET, ORALLY DISINTEGRATING ORAL EVERY 6 HOURS PRN
Status: DISCONTINUED | OUTPATIENT
Start: 2022-12-24 | End: 2022-12-29 | Stop reason: HOSPADM

## 2022-12-24 RX ORDER — PROCHLORPERAZINE MALEATE 5 MG
10 TABLET ORAL EVERY 6 HOURS PRN
Status: DISCONTINUED | OUTPATIENT
Start: 2022-12-24 | End: 2022-12-29 | Stop reason: HOSPADM

## 2022-12-24 RX ORDER — ENOXAPARIN SODIUM 100 MG/ML
40 INJECTION SUBCUTANEOUS EVERY 24 HOURS
Status: DISCONTINUED | OUTPATIENT
Start: 2022-12-24 | End: 2022-12-29 | Stop reason: HOSPADM

## 2022-12-24 RX ORDER — KETOROLAC TROMETHAMINE 30 MG/ML
30 INJECTION, SOLUTION INTRAMUSCULAR; INTRAVENOUS EVERY 6 HOURS PRN
Status: DISCONTINUED | OUTPATIENT
Start: 2022-12-24 | End: 2022-12-29 | Stop reason: HOSPADM

## 2022-12-24 RX ORDER — ALBUTEROL SULFATE 90 UG/1
2 AEROSOL, METERED RESPIRATORY (INHALATION) EVERY 6 HOURS PRN
Status: DISCONTINUED | OUTPATIENT
Start: 2022-12-24 | End: 2022-12-29 | Stop reason: HOSPADM

## 2022-12-24 RX ORDER — LIDOCAINE 40 MG/G
CREAM TOPICAL
Status: DISCONTINUED | OUTPATIENT
Start: 2022-12-24 | End: 2022-12-29 | Stop reason: HOSPADM

## 2022-12-24 RX ORDER — HYDROMORPHONE HYDROCHLORIDE 1 MG/ML
0.3 INJECTION, SOLUTION INTRAMUSCULAR; INTRAVENOUS; SUBCUTANEOUS ONCE
Status: COMPLETED | OUTPATIENT
Start: 2022-12-24 | End: 2022-12-24

## 2022-12-24 RX ORDER — HYDROMORPHONE HCL IN WATER/PF 6 MG/30 ML
0.2 PATIENT CONTROLLED ANALGESIA SYRINGE INTRAVENOUS
Status: COMPLETED | OUTPATIENT
Start: 2022-12-24 | End: 2022-12-24

## 2022-12-24 RX ORDER — ACETAMINOPHEN 325 MG/1
975 TABLET ORAL EVERY 8 HOURS PRN
Status: DISCONTINUED | OUTPATIENT
Start: 2022-12-24 | End: 2022-12-29 | Stop reason: HOSPADM

## 2022-12-24 RX ORDER — SODIUM CHLORIDE, SODIUM LACTATE, POTASSIUM CHLORIDE, CALCIUM CHLORIDE 600; 310; 30; 20 MG/100ML; MG/100ML; MG/100ML; MG/100ML
INJECTION, SOLUTION INTRAVENOUS CONTINUOUS
Status: ACTIVE | OUTPATIENT
Start: 2022-12-24 | End: 2022-12-24

## 2022-12-24 RX ORDER — KETOROLAC TROMETHAMINE 30 MG/ML
30 INJECTION, SOLUTION INTRAMUSCULAR; INTRAVENOUS ONCE
Status: COMPLETED | OUTPATIENT
Start: 2022-12-24 | End: 2022-12-24

## 2022-12-24 RX ORDER — DEXTROSE MONOHYDRATE 25 G/50ML
25-50 INJECTION, SOLUTION INTRAVENOUS
Status: DISCONTINUED | OUTPATIENT
Start: 2022-12-24 | End: 2022-12-29 | Stop reason: HOSPADM

## 2022-12-24 RX ORDER — METFORMIN HCL 500 MG
1500 TABLET, EXTENDED RELEASE 24 HR ORAL
Status: DISCONTINUED | OUTPATIENT
Start: 2022-12-24 | End: 2022-12-29 | Stop reason: HOSPADM

## 2022-12-24 RX ORDER — HYDROMORPHONE HYDROCHLORIDE 1 MG/ML
0.3 INJECTION, SOLUTION INTRAMUSCULAR; INTRAVENOUS; SUBCUTANEOUS
Status: DISCONTINUED | OUTPATIENT
Start: 2022-12-24 | End: 2022-12-29 | Stop reason: HOSPADM

## 2022-12-24 RX ORDER — MUPIROCIN 20 MG/G
OINTMENT TOPICAL 3 TIMES DAILY
Status: DISCONTINUED | OUTPATIENT
Start: 2022-12-24 | End: 2022-12-29 | Stop reason: HOSPADM

## 2022-12-24 RX ORDER — LIDOCAINE HYDROCHLORIDE 20 MG/ML
JELLY TOPICAL ONCE
Status: COMPLETED | OUTPATIENT
Start: 2022-12-25 | End: 2022-12-25

## 2022-12-24 RX ORDER — PROCHLORPERAZINE 25 MG
25 SUPPOSITORY, RECTAL RECTAL EVERY 12 HOURS PRN
Status: DISCONTINUED | OUTPATIENT
Start: 2022-12-24 | End: 2022-12-29 | Stop reason: HOSPADM

## 2022-12-24 RX ORDER — NICOTINE POLACRILEX 4 MG
15-30 LOZENGE BUCCAL
Status: DISCONTINUED | OUTPATIENT
Start: 2022-12-24 | End: 2022-12-29 | Stop reason: HOSPADM

## 2022-12-24 RX ORDER — HYDROMORPHONE HCL IN WATER/PF 6 MG/30 ML
0.2 PATIENT CONTROLLED ANALGESIA SYRINGE INTRAVENOUS
Status: DISCONTINUED | OUTPATIENT
Start: 2022-12-24 | End: 2022-12-24

## 2022-12-24 RX ORDER — ONDANSETRON 2 MG/ML
4 INJECTION INTRAMUSCULAR; INTRAVENOUS EVERY 6 HOURS PRN
Status: DISCONTINUED | OUTPATIENT
Start: 2022-12-24 | End: 2022-12-29 | Stop reason: HOSPADM

## 2022-12-24 RX ORDER — PANTOPRAZOLE SODIUM 20 MG/1
20 TABLET, DELAYED RELEASE ORAL
Status: DISCONTINUED | OUTPATIENT
Start: 2022-12-24 | End: 2022-12-29 | Stop reason: HOSPADM

## 2022-12-24 RX ADMIN — SODIUM CHLORIDE, POTASSIUM CHLORIDE, SODIUM LACTATE AND CALCIUM CHLORIDE: 600; 310; 30; 20 INJECTION, SOLUTION INTRAVENOUS at 12:02

## 2022-12-24 RX ADMIN — ENOXAPARIN SODIUM 40 MG: 40 INJECTION SUBCUTANEOUS at 07:18

## 2022-12-24 RX ADMIN — BICTEGRAVIR SODIUM, EMTRICITABINE, AND TENOFOVIR ALAFENAMIDE FUMARATE 1 TABLET: 50; 200; 25 TABLET ORAL at 07:18

## 2022-12-24 RX ADMIN — PANTOPRAZOLE SODIUM 20 MG: 20 TABLET, DELAYED RELEASE ORAL at 07:18

## 2022-12-24 RX ADMIN — INSULIN ASPART 2 UNITS: 100 INJECTION, SOLUTION INTRAVENOUS; SUBCUTANEOUS at 07:18

## 2022-12-24 RX ADMIN — MUPIROCIN: 20 OINTMENT TOPICAL at 07:18

## 2022-12-24 RX ADMIN — SODIUM CHLORIDE, POTASSIUM CHLORIDE, SODIUM LACTATE AND CALCIUM CHLORIDE: 600; 310; 30; 20 INJECTION, SOLUTION INTRAVENOUS at 03:37

## 2022-12-24 RX ADMIN — INSULIN ASPART 3 UNITS: 100 INJECTION, SOLUTION INTRAVENOUS; SUBCUTANEOUS at 12:52

## 2022-12-24 RX ADMIN — OXYCODONE HYDROCHLORIDE 2.5 MG: 5 TABLET ORAL at 07:21

## 2022-12-24 RX ADMIN — KETOROLAC TROMETHAMINE 30 MG: 30 INJECTION, SOLUTION INTRAMUSCULAR; INTRAVENOUS at 18:02

## 2022-12-24 RX ADMIN — ONDANSETRON 4 MG: 2 INJECTION INTRAMUSCULAR; INTRAVENOUS at 20:05

## 2022-12-24 RX ADMIN — KETOROLAC TROMETHAMINE 30 MG: 30 INJECTION, SOLUTION INTRAMUSCULAR at 11:34

## 2022-12-24 RX ADMIN — Medication 1 MG: at 04:15

## 2022-12-24 RX ADMIN — PROCHLORPERAZINE EDISYLATE 10 MG: 5 INJECTION INTRAMUSCULAR; INTRAVENOUS at 20:53

## 2022-12-24 RX ADMIN — ACETAMINOPHEN 975 MG: 325 TABLET, FILM COATED ORAL at 01:59

## 2022-12-24 RX ADMIN — HYDROMORPHONE HYDROCHLORIDE 0.3 MG: 1 INJECTION, SOLUTION INTRAMUSCULAR; INTRAVENOUS; SUBCUTANEOUS at 00:14

## 2022-12-24 RX ADMIN — PANTOPRAZOLE SODIUM 20 MG: 20 TABLET, DELAYED RELEASE ORAL at 18:03

## 2022-12-24 RX ADMIN — HYDROMORPHONE HYDROCHLORIDE 0.2 MG: 0.2 INJECTION, SOLUTION INTRAMUSCULAR; INTRAVENOUS; SUBCUTANEOUS at 04:15

## 2022-12-24 RX ADMIN — INSULIN ASPART 3 UNITS: 100 INJECTION, SOLUTION INTRAVENOUS; SUBCUTANEOUS at 18:02

## 2022-12-24 ASSESSMENT — ACTIVITIES OF DAILY LIVING (ADL)
DOING_ERRANDS_INDEPENDENTLY_DIFFICULTY: NO
WEAR_GLASSES_OR_BLIND: NO
WALKING_OR_CLIMBING_STAIRS_DIFFICULTY: NO
DRESSING/BATHING_DIFFICULTY: NO
ADLS_ACUITY_SCORE: 22
ADLS_ACUITY_SCORE: 22
ADLS_ACUITY_SCORE: 37
DIFFICULTY_EATING/SWALLOWING: NO
ADLS_ACUITY_SCORE: 37
TOILETING_ISSUES: NO
ADLS_ACUITY_SCORE: 37
ADLS_ACUITY_SCORE: 22
ADLS_ACUITY_SCORE: 37
ADLS_ACUITY_SCORE: 22
ADLS_ACUITY_SCORE: 37
ADLS_ACUITY_SCORE: 22
CONCENTRATING,_REMEMBERING_OR_MAKING_DECISIONS_DIFFICULTY: NO
CHANGE_IN_FUNCTIONAL_STATUS_SINCE_ONSET_OF_CURRENT_ILLNESS/INJURY: NO
FALL_HISTORY_WITHIN_LAST_SIX_MONTHS: NO
HEARING_DIFFICULTY_OR_DEAF: NO
ADLS_ACUITY_SCORE: 22
ADLS_ACUITY_SCORE: 37
DIFFICULTY_COMMUNICATING: NO

## 2022-12-24 ASSESSMENT — ENCOUNTER SYMPTOMS
WEAKNESS: 0
BLOOD IN STOOL: 0

## 2022-12-24 NOTE — PROVIDER NOTIFICATION
"GOLD 8 paged \"7b, [id number], Fabián.   FYI BG = 414 despite current SS regimen.   Thanks, Tara Venegas ([facility])\"    No response from Gold 8, gold cross cover paged again.   "

## 2022-12-24 NOTE — PLAN OF CARE
Vitals: Temp: (!) 96.4  F (35.8  C) Temp src: Oral BP: 104/73 Pulse: 81   Resp: 16 SpO2: 98 % O2 Device: None (Room air)        Time: 5571-5652  Reason for admission: SBO.  Activity:  up ad lexi.   Pain:  Toradol given x1.   Neuro: A&O x4. CMS intact.   Cardiac: WDL. Denies chest pain.   Respiratory:  LS clear and equal. RA. Denies cough or SOB.  GI/: Voiding spontaneously. Reports having loose stools in ED, none since up on floor. Emesis x1, denied nausea after, refused medications.   Diet: Clears, tolerating fairly.   Lines:  L PIV infusing LR @100 ml/hr.   Incisions/Drains/Skin:  Refused full skin assessment, only able to visualize extremities. Pt reports no skin issues.   Lab:  BG = 389, 414 (gold 8 paged).  Need stool sample.   Electrolyte Replacements: Not indicated.      New changes this shift:  No changes. Continue POC.

## 2022-12-24 NOTE — PLAN OF CARE
Admitted/transferred from: ED  2 RN skin assessment completed by Tara Venegas, RN and Dali Diop RN.  Skin assessment finding:    Interventions/actions: Pt educated on maintaining skin integrity, no interventions necessary at this time.      Bedside Emergency Equipment Present:  Suction Regulator: YES  Suction Canister: YES  Tubing between Regulator and Canister: YES  O2 Regulator with Tree: YES  Ambu Bag: YES

## 2022-12-24 NOTE — ED TRIAGE NOTES
"Reports abdominal \"cramps\" with associated nausea, vomiting and dizziness. Reports that he thinks he \"ate some bad beef\" at the place that he is staying.      Triage Assessment     Row Name 12/23/22 8820       Triage Assessment (Adult)    Airway WDL WDL       Respiratory WDL    Respiratory WDL WDL       Skin Circulation/Temperature WDL    Skin Circulation/Temperature WDL WDL       Cardiac WDL    Cardiac WDL WDL       Peripheral/Neurovascular WDL    Peripheral Neurovascular WDL WDL       Cognitive/Neuro/Behavioral WDL    Cognitive/Neuro/Behavioral WDL WDL              "

## 2022-12-24 NOTE — PROVIDER NOTIFICATION
"GOLD 8 paged \"7b, 3830, Fabián.   FYI pt's BG = 389, covered with 3 units Novolog.  Thanks, Tara Venegas (tashia) \"      "

## 2022-12-24 NOTE — ED PROVIDER NOTES
"    [place] EMERGENCY DEPARTMENT ([facility])    12/23/22       ED PROVIDER NOTE  ED 18    History     Chief Complaint   Patient presents with     Abdominal Pain     HPI  Andrew Lockwood is a 55 year old male, well-known to the ED, with PMH notable for HIV, prior SBOs and adynamic ileus, DM2, HLD, GERD, chronic abdominal pain, prior toxoplasmosis, history of substance abuse, PTSD, recent COVID-19 diagnosis (for which he was prescribed Paxlovid), presenting today w/ a 1-day history of cramping abdominal discomfort and nausea/non-bloody vomiting.      PRIOR HISTORY REVIEW:   Patient has had multiple ED visits here in December.  Was admitted 12/7/2022 - 12/10/2022 with abdominal pain concerning for partial SBO, ileus or gastric outlet obstruction which improved as well as concern for sepsis, hyperglycemia, AGMA and pseudohyponatremia.  Patient improved and he was able to be discharged with plan to follow-up with PCP.  Also noted he had some leukocytosis and tachycardia but that resolved.  He had received empiric antibiotics which were discontinued.  COVID and influenza testing were negative at the time.     Recently, patient was seen in the ED on 12/17/2022 (6 days ago), diagnosed with COVID.  After discussion with ID, he was prescribed Paxlovid, which he says he had been taking.  No obvious acute findings on EKG or CXR per their note.  Given IV fluids. Ultimately discharged.     CURRENT PRESENTATION:  Patient presents today with a 1 day history of abdominal pain, nausea, and nonbloody emesis.  He describes the pain as being \"cramping\" and located \"all over\" and circles his hand over the whole of his abdomen to indicate the location of discomfort.  It is associated with nausea and nonbloody emesis, does not notice any coffee-ground emesis.  Able to pass flatus, he does not feel as distended like he has in the past with prachi bowel obstructions per his report. Stools are loose.  No urinary or  symptoms or " changes.  Denies any fevers or chills.  Says that he been taking his Paxlovid as previously prescribed.  No chest pain, no shortness of breath, no notable cough and says he is been doing quite well from a COVID standpoint.  No lightheadedness, dizziness, syncope or near syncope.  No headaches, no neck pain or stiffness.  No new numbness, tingling or weakness.  No new rashes or skin changes.  No neck or back discomfort.  No new extremity symptoms.  No other new symptoms or complaints this time.  Please see ROS for further details.    I have reviewed the Medications, Allergies, Past Medical and Surgical History, and Social History in the [employer] system.  Past Medical History:   Diagnosis Date     AIDS (H)      Allergic rhinitis due to other allergen     DNS     Anal dysplasia      Chronic abdominal pain      CNS toxoplasmosis (H)      Diabetes type 2, controlled (H)      GERD (gastroesophageal reflux disease)      History of seizure      History of substance abuse (H)      HIV (human immunodeficiency virus infection) (H)      HLD (hyperlipidemia)      Lung nodules      Periungual wart      PTSD (post-traumatic stress disorder)      Sleep apnea     doesn't use CPAP       Past Surgical History:   Procedure Laterality Date     ANOSCOPY N/A 9/9/2020    Procedure: Exam Under Anesthesia, ANOSCOPY, fulgeration of rectal fissures with Rectal Biopsies;  Surgeon: Thanh Lundberg MD;  Location: [facility] OR     COLONOSCOPY Left 1/22/2016    Procedure: COMBINED COLONOSCOPY, SINGLE OR MULTIPLE BIOPSY/POLYPECTOMY BY BIOPSY;  Surgeon: Clark Saini MD;  Location:  GI     ESOPHAGOSCOPY, GASTROSCOPY, DUODENOSCOPY (EGD), COMBINED N/A 6/6/2022    Procedure: ESOPHAGOGASTRODUODENOSCOPY, WITH BIOPSY;  Surgeon: Kecia Benítez MD;  Location:  GI     HC EXPLORE UNDESC TESTIS,INGUIN/SCROTAL       LAPAROSCOPIC APPENDECTOMY N/A 1/31/2018    Procedure: LAPAROSCOPIC APPENDECTOMY;  LAPAROSCOPIC APPENDECTOMY;  Surgeon: Dawn Holt  MD Carrie;  Location: [facility] OR     LAPAROSCOPY DIAGNOSTIC (GENERAL) N/A 7/26/2016    Procedure: LAPAROSCOPY DIAGNOSTIC (GENERAL);  Surgeon: Susannah Arriaga MD;  Location: [facility] OR     LAPAROSCOPY DIAGNOSTIC (GENERAL) N/A 4/16/2018    Procedure: LAPAROSCOPY DIAGNOSTIC (GENERAL);  Diagnostic laparoscopy and lysis of adhesions;  Surgeon: Prince Dowling MD;  Location: [facility] OR     OPTICAL TRACKING SYSTEM CRANIOTOMY, EXCISE TUMOR, COMBINED Left 4/10/2015    Procedure: COMBINED OPTICAL TRACKING SYSTEM CRANIOTOMY, EXCISE TUMOR;  Surgeon: Mirlande Colmenares MD;  Location: [facility] OR     REPAIR GAMEKEEPER'S THUMB Right 12/2/2016    Procedure: REPAIR LIGAMENT ULNAR COLLATERAL THUMB (GAMEKEEPER'S);  Surgeon: Evin Zamorano MD;  Location:  OR     [facility] NONSPECIFIC PROCEDURE      right forearm fracture     Past medical history, past surgical history, medications, and allergies were reviewed with the patient.     Family History   Problem Relation Age of Onset     Diabetes Brother      Diabetes Father      Alzheimer Disease Father      Unknown/Adopted Mother      Diabetes Paternal Grandfather      Cancer No family hx of         no skin cancer     Skin Cancer No family hx of         no famiy hx of skin cancer     Glaucoma No family hx of      Macular Degeneration No family hx of        Social History     Tobacco Use     Smoking status: Some Days     Packs/day: 0.25     Years: 40.00     Pack years: 10.00     Types: Cigarettes     Smokeless tobacco: Former   Substance Use Topics     Alcohol use: No     Alcohol/week: 0.0 standard drinks     Comment: Last etoh in 2007      Social history was reviewed with the patient.    Review of Systems   Constitutional: Negative for chills and fever.   Respiratory: Negative for cough and shortness of breath.    Cardiovascular: Negative for chest pain and leg swelling.   Gastrointestinal: Positive for abdominal pain, nausea and vomiting (nonbloody). Negative for abdominal distention and  "blood in stool. Diarrhea: looser than usual.   Genitourinary: Negative.    Musculoskeletal: Negative for back pain, neck pain and neck stiffness.   Skin: Negative.  Negative for color change and rash.   Allergic/Immunologic: Immunocompromised state: HIV hx.   Neurological: Negative for dizziness, syncope, weakness, light-headedness and headaches.   Psychiatric/Behavioral: Negative for suicidal ideas.   All other systems reviewed and are negative.    A complete review of systems was performed with pertinent positives and negatives noted in the HPI, and all other systems negative.    Physical Exam   BP: 138/88  Pulse: 95  Temp: 97.4  F (36.3  C)  Resp: 16  Height: 160 cm (5' 3\")  Weight: 63.5 kg (140 lb)  SpO2: 99 %    CONSTITUTIONAL: Well-developed and well-nourished. Awake and alert. Non-toxic appearance. No acute distress.   HENT:   - Head: Normocephalic and atraumatic.   - Ears: Hearing and external ear grossly normal.   - Nose: Nose normal. No rhinorrhea. No epistaxis.   - Mouth/Throat: MMM  EYES: Conjunctivae and lids are normal. No scleral icterus.   NECK: Normal range of motion and phonation normal. Neck supple.  No tracheal deviation, no stridor. No edema or erythema noted.  CARDIOVASCULAR: Normal rate, regular rhythm and no appreciable abnormal heart sounds.  PULMONARY/CHEST: Normal work of breathing. No accessory muscle usage or stridor. No respiratory distress.  No appreciable abnormal breath sounds.  ABDOMEN: Patient reports diffuse abdominal discomfort to palpation that he describes as being severe, but without any prachi peritoneal findings. No rigidity, rebound or guarding.  No palpable masses or abnormal pulsatility appreciated. Soft.   MUSCULOSKELETAL: Extremities warm and seemingly well perfused. No edema or calf tenderness.  NEUROLOGIC: Awake, alert. Not disoriented. He displays no atrophy and no tremor. Normal tone. No seizure activity. GCS 15  SKIN: Skin is warm and dry. No rash noted. No " diaphoresis. No pallor.   PSYCHIATRIC: Normal mood and affect. Speech and behavior normal. Thought processes linear. Cognition and memory are normal.      ED Course            EKG Interpretation:      Interpreted by Ceci Trammell MD  Time reviewed: 2105pm  Symptoms at time of EKG: Abdominal pain, N/V   Rhythm: normal sinus   Rate: Normal  Axis: Normal  Ectopy: none  Conduction: QTC not prolonged, QRS WNL   ST Segments/ T Waves: No ST elevation or depression, does have TWI in lead III  Comparison to prior: vs. 8 Dec 2022 ; heart rates consistently in the 90s.  Seem to have a bit more T WI in lead III currently.  Otherwise looks generally similar  Clinical Impression: Sinus, TWI seems a bit more notable in lead III        Assessments & Plan (with Medical Decision Making)   IMPRESSION: 56 yo M, PMH notable for HIV, prior SBOs and adynamic ileus, DM2, HLD, GERD, chronic abdominal pain, prior toxoplasmosis, history of substance abuse, PTSD, recent COVID-19 diagnosis (for which he was prescribed Paxlovid), presenting today w/ a 1-day history of cramping abdominal discomfort and nausea/non-bloody vomiting.     Clinically, patient appears nontoxic, NAD.  Vitals show some elevated BP though this appears to be consistent with prior and patient is seemingly asymptomatic from such.  Otherwise on examination, seems protecting his own airway, no respiratory distress or increased work of breathing as at all seems quite normal and has normal SPO2 on RA.  Does have diffuse discomfort to palpation on abdomen but without prachi peritoneal findings, no palpable masses or abnormal pulsatility.    DDx includes, but not limited to, gastritis/PUD, pancreatitis, enteritis, colitis, a partial bowel obstruction or recurrent ileus/gastric obstruction, effect from COVID or paxlovid treatment, amongst others.    PLAN: Laboratory studies, urine studies, repeat CT imaging, symptom management, disposition pending ED course  - Risks/benefits of  "pursuing imaging review and accepted.     RESULTS:  - Labs: Lactate normal, no leukocytosis, Hgb 14.7 is up from previous  - Urine: No apparent UTI  - Imaging: Written preliminary reports reviewed:  --- CT A/P: \"1.  Dilatation of the jejunal loop in left midabdomen extending over to an area of a narrowing involving the small bowel in the right mid abdomen. Findings most compatible with partial obstruction given that there remains some air and fluid within the  colon and distal small bowel. No focal mass or definitive evidence for abnormality at the area of narrowing/tapering in the right mid abdomen. Continued follow-up recommended.  2.  Mild wall thickening into the dilated loop of jejunum which is slightly decreased since the prior study likely indicating persistent changes of enteritis.  3.  Marked gastric distention with fluid.  4.  Cholelithiasis.  5.  Hepatic steatosis.\"  --- Results/reports reviewed w/ patient who expresses understanding of findings and F/U recommendations.    INTERVENTIONS:   - IVF  - IV Dilaudid    RE-EVALUATION:  - The patient's symptoms were somewhat improved during ED course, but still present  - Pt otherwise continues to do well here in the ED, no acute issues or apparent concerning changes in vitals or clinical appearance.    DISCUSSIONS:  - w/ IM: Reviewed patient/presentation, current state of workup/any pending studies. They will admit for further evaluation/management, F/U pending studies as needed, coordinate w/ consulting services as needed. No additional requests/recommendations for workup/management for in the ED at this time.   - w/ Patient: I have reviewed the available findings, plan with the patient. He expressed understanding and agreement with this plan. All questions answered to the best of our ability at this time.     DISPOSITION/PLANNING:  - IMPRESSION:   --- Abdominal pain, loose stools, Nausea, vomiting  --- Partial SBO, possible enteritis  --- Recent COVID-19 " diagnosis (and Paxlovid treatment)  --- Abnormal ECG, TWI  --- Hyperglycemia w/o notable ketone elevation (hx of quick drops in review of EMR)  - DISPOSITION:   --- Admit to IM for further evaluation/management  - RECOMMENDATIONS: Stools studies should patient be able to provide sample. Close glucose monitoring (patient did have rapid drop of glucose w/ prior admission), consider ID consult, et al.     This part of the document was transcribed by Yadira Boss, Medical Scribe.    ______________________________________________________________________       CECI RODRIGUEZ MD    12/23/2022   [facility] EMERGENCY DEPARTMENT     Ceci Rodriguez MD  12/24/22 0411

## 2022-12-24 NOTE — PROGRESS NOTES
"[id number]    Vital signs:  Temp: 97.3  F (36.3  C) Temp src: Axillary BP: 113/82 Pulse: 83   Resp: 16 SpO2: 97 % O2 Device: None (Room air)   Height: 160 cm (5' 3\") Weight: 63.5 kg (140 lb)  Estimated body mass index is 24.8 kg/m  as calculated from the following:    Height as of this encounter: 1.6 m (5' 3\").    Weight as of this encounter: 63.5 kg (140 lb).    AO x4. VSS on RA. Pain managed w/ one time PRN oxycodone. Denies SOB/nausea at this time.  this AM, covered per s/s. Tolerating clear liquid diet. Voids spontaneously using urinal. SBA.     Nursing report given to 7B nurse. Continue w/ POC.  "

## 2022-12-24 NOTE — H&P
[facility]    History and Physical - Medicine Service, MAROON TEAM        Date of Admission:  12/23/2022    Assessment & Plan      Andrew Lockwood is a 55 year old male with PMH notable for HIV, prior SBOs and adynamic ileus, DM2, HLD, GERD, chronic abdominal pain, prior toxoplasmosis, history of substance abuse, PTSD, recent COVID-19 diagnosis (for which he was prescribed Paxlovid) presenting with partial small bowel obstruction and concern for gastroenteritis.    # Diarrhea  # C/f gastroenteritis  History consistent with gastroenteritis/food poisoning. Given HIV+ at risk for opportunistic infections. No evidence of diverticulitis on CT AP. No leukocytosis, afebrile and HDS, normal renal function. S/p 500 ml bolus in ED and taking PO now so will hold off on additional IVF.  - Workup:   - C diff, enteric pathogens, stool O&P, CMV, EBV, cryptosporidium, giardia  - IVMF x 6 hr, can give more depending on PO intake  - ADAT  - Tylenol for abdominal pain  - Zofran/compazine for nausea/vomiting    #Partial SBO  #Gastric Distension   History of recurrent partial SBO. States his SBO pain is usual focal LLQ pain, and although he does have some LLQ tenderness this is not his primary presenting complaint. CT AP with partial obstruction, gastric distension, cholelithiasis, hepatic steatosis. Asked to eat a popsicle in the ED, and has no additional vomiting since 12/23 AM. Also is passing gas and stool. Given this, favor against NG tube at this time and will advance diet as tolerated.  - ADAT  - IVMF  - No NG or surgery/GI consult at this time  - Tylenol for pain     #Hyperglycemia d/t Missed Insulin  #DM2 Hgb A1c 12.8%  #Pseudohyponatremia - Corrected Na 138  Reports not taking insulin as he believes it causes his bowel obstructions.  Does take his metformin. No anion gap or acidosis. Recent ED visit showed rapid correction of BG from 299 to 47 with IVF alone, so will be gentle  "with insulin to start. May need to consider adjusting home DM medications upon discharge.  - Hold metformin  - LDSSI     #HIV: Continue PTA biktarvy    # Recent COVID-19 infection, resolved  S/p 5 days Paxlovid 12/17-12/21.  No ongoing symptoms including no respiratory symptoms, no fever.     Diet: Advance Diet as Tolerated: Clear Liquid Diet  DVT Prophylaxis: Enoxaparin (Lovenox) SQ  Potts Catheter: Not present  Fluids: LR @125/hr x 6 hrs  Central Lines: None  Cardiac Monitoring: None  Code Status: Full Code    Clinically Significant Risk Factors Present on Admission         # Hyponatremia: Lowest Na = 133 mmol/L in last 2 days, will monitor as appropriate   # Hypercalcemia: Highest Ca = 10.4 mg/dL in last 2 days, will monitor as appropriate            # DMII: A1C = 12.8 % (Ref range: <5.7 %) within past 3 months          Disposition Plan      Expected Discharge Date: 12/25/2022                To be formally staffed in .    Anita Mora MD  Medicine Service, [facility]  Securely message with the Vocera Web Console (learn more here)  Text page via [facility] Paging/Directory   Please see signed in provider for up to date coverage information    ______________________________________________________________________    Chief Complaint   Abdominal pain, nausea    History of Present Illness   Andrew Lockwood is a 55 year old male with PMH notable for HIV, prior SBOs and adynamic ileus, DM2, HLD, GERD, chronic abdominal pain, prior toxoplasmosis, history of substance abuse, PTSD, recent COVID-19 diagnosis (for which he was prescribed Paxlovid), presenting today w/ a 1-day history of cramping abdominal discomfort and nausea/non-bloody vomiting.     Ate breakfast the morning of 12/23 in the shelter he is staying at, ate yogurt and chicken and reports that the chicken \"always makes him sick.\"  Soon after he developed some abdominal cramping across his abdomen and had " nausea and threw up 3 times.  Denies any blood or bile in the nausea.  In the evening after arriving to the ED, reports 5 episodes of watery diarrhea.  No blood or melena diarrhea.  Says when he has his small bowel obstructions he usually has left lower quadrant pain, now he has some left lower quadrant tenderness to palpation but the cramping is atypical for SBO for him and the cramping is more across his upper abdomen diffusely.  Has not eaten or drink since breakfast.  Denies any ongoing nausea or vomiting.    Recently seen in ED on 12/17/2022 for stuffy nose, cough and tested positive for COVID. No acute finding on EKG and CXR. Was discharged on 5 days back fluid which she is finished.  Has no lingering symptoms of COVID including no dyspnea, no chest pain, no cough, no fever, no hemoptysis, no increased sputum production.  Denies urinary symptoms, headaches, dizziness, rashes, chest pain, pain aside from abdominal pain.    Recently admitted 12/7-12/10 for abdominal pain concerning for partial SBO, ileus or gastric outlet obstruction which improved as well as concern for sepsis, hyperglycemia, and AGMA. Was given empiric abx for suspected sepsis but were discontinued with no source found.     Past Medical History    I have reviewed this patient's medical history and updated it with pertinent information if needed.   Past Medical History:   Diagnosis Date     AIDS (H)      Allergic rhinitis due to other allergen     DNS     Anal dysplasia      Chronic abdominal pain      CNS toxoplasmosis (H)      Diabetes type 2, controlled (H)      GERD (gastroesophageal reflux disease)      History of seizure      History of substance abuse (H)      HIV (human immunodeficiency virus infection) (H)      HLD (hyperlipidemia)      Lung nodules      Periungual wart      PTSD (post-traumatic stress disorder)      Sleep apnea     doesn't use CPAP        Past Surgical History   I have reviewed this patient's surgical history and  updated it with pertinent information if needed.  Past Surgical History:   Procedure Laterality Date     ANOSCOPY N/A 9/9/2020    Procedure: Exam Under Anesthesia, ANOSCOPY, fulgeration of rectal fissures with Rectal Biopsies;  Surgeon: Thanh Lundberg MD;  Location: [facility] OR     COLONOSCOPY Left 1/22/2016    Procedure: COMBINED COLONOSCOPY, SINGLE OR MULTIPLE BIOPSY/POLYPECTOMY BY BIOPSY;  Surgeon: Clark Saini MD;  Location: [facility] GI     ESOPHAGOSCOPY, GASTROSCOPY, DUODENOSCOPY (EGD), COMBINED N/A 6/6/2022    Procedure: ESOPHAGOGASTRODUODENOSCOPY, WITH BIOPSY;  Surgeon: Kecia Benítez MD;  Location: [facility] GI     HC EXPLORE UNDESC TESTIS,INGUIN/SCROTAL       LAPAROSCOPIC APPENDECTOMY N/A 1/31/2018    Procedure: LAPAROSCOPIC APPENDECTOMY;  LAPAROSCOPIC APPENDECTOMY;  Surgeon: Dawn Holt MD;  Location: [facility] OR     LAPAROSCOPY DIAGNOSTIC (GENERAL) N/A 7/26/2016    Procedure: LAPAROSCOPY DIAGNOSTIC (GENERAL);  Surgeon: Susannah Arriaga MD;  Location: [facility] OR     LAPAROSCOPY DIAGNOSTIC (GENERAL) N/A 4/16/2018    Procedure: LAPAROSCOPY DIAGNOSTIC (GENERAL);  Diagnostic laparoscopy and lysis of adhesions;  Surgeon: Prince Dowling MD;  Location: [facility] OR     OPTICAL TRACKING SYSTEM CRANIOTOMY, EXCISE TUMOR, COMBINED Left 4/10/2015    Procedure: COMBINED OPTICAL TRACKING SYSTEM CRANIOTOMY, EXCISE TUMOR;  Surgeon: Mirlande Colmenares MD;  Location: [facility] OR     REPAIR GAMEKEEPER'S THUMB Right 12/2/2016    Procedure: REPAIR LIGAMENT ULNAR COLLATERAL THUMB (GAMEKEEPER'S);  Surgeon: Evin Zamorano MD;  Location:  OR     ZZC NONSPECIFIC PROCEDURE      right forearm fracture        Social History   Does not drink alcohol  Uses methamphetamine  IV, has not used in 2 months    Family History   I have reviewed this patient's family history and updated it with pertinent information if needed.  Family History   Problem Relation Age of Onset     Diabetes Brother      Diabetes Father      Alzheimer Disease  "Father      Unknown/Adopted Mother      Diabetes Paternal Grandfather      Cancer No family hx of         no skin cancer     Skin Cancer No family hx of         no famiy hx of skin cancer     Glaucoma No family hx of      Macular Degeneration No family hx of        Prior to Admission Medications   Prior to Admission Medications   Prescriptions Last Dose Informant Patient Reported? Taking?   Alcohol Swabs PADS   No No   Si swab daily   Gauze Pads & Dressings (GAUZE PADS 4\"X4\") 4\"X4\" PADS   No No   Si each daily   albuterol (PROAIR HFA/PROVENTIL HFA/VENTOLIN HFA) 108 (90 Base) MCG/ACT inhaler   No No   Sig: Inhale 2 puffs into the lungs every 6 hours as needed for shortness of breath, wheezing or cough   bictegravir-emtricitabine-tenofovir (BIKTARVY) -25 MG per tablet   No No   Sig: Take 1 tablet by mouth daily   insulin detemir (LEVEMIR PEN) 100 UNIT/ML pen   No No   Sig: Inject 17 Units Subcutaneous every 24 hours   insulin pen needle (32G X 4 MM) 32G X 4 MM miscellaneous   No No   Sig: Use pen needles three times daily before meals and also before bed.   metFORMIN (GLUCOPHAGE XR) 500 MG 24 hr tablet   No No   Sig: Take 3 tablets (1,500 mg) by mouth daily (with dinner) With food   mupirocin (BACTROBAN) 2 % external ointment   No No   Sig: Apply topically 3 times daily   nirmatrelvir and ritonavir (PAXLOVID, 300/100,) therapy pack   No No   Sig: Take 3 tablets by mouth 2 times daily for 5 days   pantoprazole (PROTONIX) 20 MG EC tablet   No No   Sig: Take 1 tablet (20 mg) by mouth 2 times daily   sterile water, bottle, irrigation   No No   Sig: Irrigate with 1,000 mLs as directed daily      Facility-Administered Medications: None     Allergies   Allergies   Allergen Reactions     Fentanyl Blisters     Per pt, after taking this medication had blisters develope     Tylenol [Acetaminophen] Itching     Dulaglutide Rash     Insulin Lispro Rash     Patient reported     Penicillin V Other (See Comments) and " "Rash     Diffuse maculopapular rash + feels \"high\", per pt.        Physical Exam   Vital Signs: Temp: 97.4  F (36.3  C)   BP: 123/68 Pulse: 88   Resp: 14 SpO2: 100 % O2 Device: None (Room air)    Weight: 140 lbs 0 oz    Constitutional: comfortable, no distress  Eyes: no scleral icterus  Respiratory: CTAB, no increased WOB  Cardiovascular: RRR, no murmurs  GI: + BS (hyperactive),   Skin: no rash  Musculoskeletal: no peripheral edema, no deformities  Neurologic: moving all extremities, CN 2-12 intact  Neuropsychiatric: appropriate, oriented    Data   Data reviewed today: I reviewed all medications, new labs and imaging results over the last 24 hours.    Recent Labs   Lab 12/24/22  0017 12/23/22  1940 12/17/22  1347 12/17/22  0812   WBC  --  9.8  --  8.5   HGB  --  14.7  --  12.5*   MCV  --  90  --  90   PLT  --  457*  --  323   INR  --  0.95  --  0.97   NA  --  133*  --  134*   POTASSIUM  --  4.4  --  3.6   CHLORIDE  --  96*  --  99   CO2  --  24  --  18*   BUN  --  24.3*  --  13.3   CR  --  0.67  --  0.58*   ANIONGAP  --  13  --  17*   LINDA  --  10.4*  --  9.5   * 409* 274* 399*   ALBUMIN  --  4.3  --  3.8   PROTTOTAL  --  7.7  --  6.6   BILITOTAL  --  0.5  --  0.3   ALKPHOS  --  142*  --  184*   ALT  --  14  --  17   AST  --  14  --  20   LIPASE  --  65*  --  55     Recent Results (from the past 24 hour(s))   CT Abdomen Pelvis w Contrast    Narrative    EXAM: CT ABDOMEN PELVIS W CONTRAST  LOCATION: [facility]  DATE/TIME: 12/23/2022 10:13 PM    INDICATION: Nonlocalized abdominal pain with nausea and nonbloody emesis.  COMPARISON: Multiple prior studies, most recent 12/15/2022  TECHNIQUE: CT scan of the abdomen and pelvis was performed following injection of IV contrast. Multiplanar reformats were obtained. Dose reduction techniques were used.  CONTRAST: 86 mL Isovue-370    FINDINGS:   LOWER CHEST: Normal.    HEPATOBILIARY: Hepatic steatosis. Cholelithiasis with a " single small 3 mm calculus. No biliary dilatation.    PANCREAS: Normal.    SPLEEN: Normal.    ADRENAL GLANDS: Normal.    KIDNEYS/BLADDER: Normal.    BOWEL: Dilatation of a jejunal loop in the left mid abdomen containing a large amount of fecal material extending up to the right mid abdomen where there is narrowing without definitive evidence for focal mass in the right mid abdomen (series 4, images   198-214). Mild jejunal wall thickening which can be seen with underlying enteritis, slightly decreased since 12/15/2022. Marked gastric distention. Colon unremarkable.    LYMPH NODES: No lymphadenopathy.    VASCULATURE: Normal caliber aorta. No dissection. Minimal vascular calcification.    PELVIC ORGANS: Moderate prostate enlargement. No free fluid. Penile prosthesis.    MUSCULOSKELETAL: No significant osseous abnormality.      Impression    IMPRESSION:   1.  Dilatation of the jejunal loop in left midabdomen extending over to an area of a narrowing involving the small bowel in the right mid abdomen. Findings most compatible with partial obstruction given that there remains some air and fluid within the   colon and distal small bowel. No focal mass or definitive evidence for abnormality at the area of narrowing/tapering in the right mid abdomen. Continued follow-up recommended.    2.  Mild wall thickening into the dilated loop of jejunum which is slightly decreased since the prior study likely indicating persistent changes of enteritis.    3.  Marked gastric distention with fluid.    4.  Cholelithiasis.    5.  Hepatic steatosis.

## 2022-12-24 NOTE — PROGRESS NOTES
"Subjective    Still experiencing abdominal pain diffusely all over  Diarrhea started yesterday morning along with abdominal pain, gets symptoms every 2 weeks or so of abdominal pain he attributes to obstruction  Doesn't have control over his diet given he lives in a shelter  Maintains adherence with biktarvy, undetectable viral load  Drinking water and eating make his belly pain worse  Last bm was diarrhea about 15-30 minutes prior to our conversation, estimates 10 episodes since yesterday morning, no blood, + mucous    Objective    Vital signs:  Temp: 97.3  F (36.3  C) Temp src: Axillary BP: 113/82 Pulse: 83   Resp: 16 SpO2: 97 % O2 Device: None (Room air)   Height: 160 cm (5' 3\") Weight: 63.5 kg (140 lb)  Estimated body mass index is 24.8 kg/m  as calculated from the following:    Height as of this encounter: 1.6 m (5' 3\").    Weight as of this encounter: 63.5 kg (140 lb).    Lying flat in bed in mild distress  Awake, alert, answers questions and follows commands  Respiratory rate and work of breathing are normal  Abdomen is soft, mildly tender throughout but non distended without rebound  Extremities are without edema or cyanosis    CBC RESULTS: Recent Labs   Lab Test 12/24/22  0519   WBC 10.8   RBC 4.32*   HGB 13.2*   HCT 38.8*   MCV 90   MCH 30.6   MCHC 34.0   RDW 12.0          Assessment    Viral gastroenteritis  Partial SBO    Plan  Continue conservative management with CLD ADAT, serial exams, LR at 100 mL/h  Ordered ketorolac q6h for abd pain to complement tylenol; avoid opiates    Cornelius Byers DO  Hospitalist    Medicine and Pediatrics  [email or web address]  Pager: [phone]  Available on Vocera            "

## 2022-12-25 ENCOUNTER — APPOINTMENT (OUTPATIENT)
Dept: GENERAL RADIOLOGY | Facility: CLINIC | Age: 57
End: 2022-12-25
Attending: HOSPITALIST
Payer: COMMERCIAL

## 2022-12-25 LAB
ALBUMIN SERPL BCG-MCNC: 3.2 G/DL (ref 3.5–5.2)
ALP SERPL-CCNC: 80 U/L (ref 40–129)
ALT SERPL W P-5'-P-CCNC: 8 U/L (ref 10–50)
ANION GAP SERPL CALCULATED.3IONS-SCNC: 11 MMOL/L (ref 7–15)
AST SERPL W P-5'-P-CCNC: 13 U/L (ref 10–50)
BILIRUB SERPL-MCNC: 0.4 MG/DL
BUN SERPL-MCNC: 14.4 MG/DL (ref 6–20)
CALCIUM SERPL-MCNC: 8.9 MG/DL (ref 8.6–10)
CHLORIDE SERPL-SCNC: 100 MMOL/L (ref 98–107)
CREAT SERPL-MCNC: 0.68 MG/DL (ref 0.67–1.17)
DEPRECATED HCO3 PLAS-SCNC: 22 MMOL/L (ref 22–29)
ERYTHROCYTE [DISTWIDTH] IN BLOOD BY AUTOMATED COUNT: 12 % (ref 10–15)
GFR SERPL CREATININE-BSD FRML MDRD: >90 ML/MIN/1.73M2
GLUCOSE BLDC GLUCOMTR-MCNC: 208 MG/DL (ref 70–99)
GLUCOSE BLDC GLUCOMTR-MCNC: 293 MG/DL (ref 70–99)
GLUCOSE BLDC GLUCOMTR-MCNC: 332 MG/DL (ref 70–99)
GLUCOSE BLDC GLUCOMTR-MCNC: 338 MG/DL (ref 70–99)
GLUCOSE BLDC GLUCOMTR-MCNC: 383 MG/DL (ref 70–99)
GLUCOSE BLDC GLUCOMTR-MCNC: 399 MG/DL (ref 70–99)
GLUCOSE SERPL-MCNC: 417 MG/DL (ref 70–99)
HCT VFR BLD AUTO: 37 % (ref 40–53)
HGB BLD-MCNC: 12.5 G/DL (ref 13.3–17.7)
LACTATE SERPL-SCNC: 1.7 MMOL/L (ref 0.7–2)
MCH RBC QN AUTO: 30.1 PG (ref 26.5–33)
MCHC RBC AUTO-ENTMCNC: 33.8 G/DL (ref 31.5–36.5)
MCV RBC AUTO: 89 FL (ref 78–100)
PLATELET # BLD AUTO: 382 10E3/UL (ref 150–450)
POTASSIUM SERPL-SCNC: 4.1 MMOL/L (ref 3.4–5.3)
PROT SERPL-MCNC: 5.8 G/DL (ref 6.4–8.3)
RBC # BLD AUTO: 4.15 10E6/UL (ref 4.4–5.9)
SODIUM SERPL-SCNC: 133 MMOL/L (ref 136–145)
WBC # BLD AUTO: 8.3 10E3/UL (ref 4–11)

## 2022-12-25 PROCEDURE — 74018 RADEX ABDOMEN 1 VIEW: CPT

## 2022-12-25 PROCEDURE — 83605 ASSAY OF LACTIC ACID: CPT | Performed by: HOSPITALIST

## 2022-12-25 PROCEDURE — 250N000011 HC RX IP 250 OP 636: Performed by: PEDIATRICS

## 2022-12-25 PROCEDURE — 120N000002 HC R&B MED SURG/OB UMMC

## 2022-12-25 PROCEDURE — 85027 COMPLETE CBC AUTOMATED: CPT

## 2022-12-25 PROCEDURE — 36415 COLL VENOUS BLD VENIPUNCTURE: CPT | Performed by: HOSPITALIST

## 2022-12-25 PROCEDURE — 74018 RADEX ABDOMEN 1 VIEW: CPT | Mod: 26 | Performed by: STUDENT IN AN ORGANIZED HEALTH CARE EDUCATION/TRAINING PROGRAM

## 2022-12-25 PROCEDURE — 250N000013 HC RX MED GY IP 250 OP 250 PS 637

## 2022-12-25 PROCEDURE — 99232 SBSQ HOSP IP/OBS MODERATE 35: CPT | Performed by: PEDIATRICS

## 2022-12-25 PROCEDURE — 250N000011 HC RX IP 250 OP 636

## 2022-12-25 PROCEDURE — 80053 COMPREHEN METABOLIC PANEL: CPT

## 2022-12-25 PROCEDURE — 258N000003 HC RX IP 258 OP 636: Performed by: HOSPITALIST

## 2022-12-25 PROCEDURE — 250N000012 HC RX MED GY IP 250 OP 636 PS 637: Performed by: PEDIATRICS

## 2022-12-25 RX ORDER — SODIUM CHLORIDE, SODIUM LACTATE, POTASSIUM CHLORIDE, CALCIUM CHLORIDE 600; 310; 30; 20 MG/100ML; MG/100ML; MG/100ML; MG/100ML
INJECTION, SOLUTION INTRAVENOUS CONTINUOUS
Status: DISCONTINUED | OUTPATIENT
Start: 2022-12-25 | End: 2022-12-29 | Stop reason: HOSPADM

## 2022-12-25 RX ADMIN — BICTEGRAVIR SODIUM, EMTRICITABINE, AND TENOFOVIR ALAFENAMIDE FUMARATE 1 TABLET: 50; 200; 25 TABLET ORAL at 08:22

## 2022-12-25 RX ADMIN — INSULIN ASPART 1 UNITS: 100 INJECTION, SOLUTION INTRAVENOUS; SUBCUTANEOUS at 17:32

## 2022-12-25 RX ADMIN — Medication 1 MG: at 23:54

## 2022-12-25 RX ADMIN — INSULIN ASPART 2 UNITS: 100 INJECTION, SOLUTION INTRAVENOUS; SUBCUTANEOUS at 11:27

## 2022-12-25 RX ADMIN — INSULIN GLARGINE 5 UNITS: 100 INJECTION, SOLUTION SUBCUTANEOUS at 09:37

## 2022-12-25 RX ADMIN — PANTOPRAZOLE SODIUM 20 MG: 20 TABLET, DELAYED RELEASE ORAL at 17:32

## 2022-12-25 RX ADMIN — ENOXAPARIN SODIUM 40 MG: 40 INJECTION SUBCUTANEOUS at 08:22

## 2022-12-25 RX ADMIN — INSULIN ASPART 3 UNITS: 100 INJECTION, SOLUTION INTRAVENOUS; SUBCUTANEOUS at 08:22

## 2022-12-25 RX ADMIN — KETOROLAC TROMETHAMINE 30 MG: 30 INJECTION, SOLUTION INTRAMUSCULAR; INTRAVENOUS at 00:19

## 2022-12-25 RX ADMIN — SODIUM CHLORIDE, POTASSIUM CHLORIDE, SODIUM LACTATE AND CALCIUM CHLORIDE: 600; 310; 30; 20 INJECTION, SOLUTION INTRAVENOUS at 08:28

## 2022-12-25 RX ADMIN — PANTOPRAZOLE SODIUM 20 MG: 20 TABLET, DELAYED RELEASE ORAL at 08:22

## 2022-12-25 RX ADMIN — SODIUM CHLORIDE, POTASSIUM CHLORIDE, SODIUM LACTATE AND CALCIUM CHLORIDE: 600; 310; 30; 20 INJECTION, SOLUTION INTRAVENOUS at 09:37

## 2022-12-25 RX ADMIN — KETOROLAC TROMETHAMINE 30 MG: 30 INJECTION, SOLUTION INTRAMUSCULAR; INTRAVENOUS at 08:22

## 2022-12-25 ASSESSMENT — ACTIVITIES OF DAILY LIVING (ADL)
ADLS_ACUITY_SCORE: 22

## 2022-12-25 NOTE — PLAN OF CARE
Vitals: Temp: 97.2  F (36.2  C) Temp src: Oral BP: 113/72 Pulse: 83   Resp: 16 SpO2: 99 % O2 Device: None (Room air)        Time: 9342-3086  Reason for admission: SBO.  Activity:  up ad lexi.   Pain:  Toradol given x1 in AM for abdominal pain. Denied pain rest of shift.   Neuro: A&O x4. CMS intact.   Cardiac: WDL. Denies chest pain.   Respiratory:  LS clear and equal. RA. Denies cough or SOB.  GI/: Voiding spontaneously. No BM. +flatus. Denies nausea and NG placement.   Diet: Clears, tolerating fairly.   Lines:  L PIV infusing LR @75 ml/hr.   Incisions/Drains/Skin:  Refused full skin assessment, only able to visualize extremities. Pt reports no skin issues.   Lab:  BG = 383, 338, 208 (okay for BGs to be in 300's per team since pt's A1C is 13).  Need stool sample.   Electrolyte Replacements: Not indicated.      New changes this shift:  NG can be attempted again if pt vomits, will pre-medicate with Ativan. Continue POC.

## 2022-12-25 NOTE — PROVIDER NOTIFICATION
7B rm 26 cipriano chidls has had 2 emesis and increased abdominal pain.given prn zofran and going to give prn compazine now. do you want imaging/ng placement? thanks! manjinder #[phone]

## 2022-12-25 NOTE — PROGRESS NOTES
[facility]    Medicine Progress Note - Hospitalist Service, GOLD TEAM 8    Date of Admission:  12/23/2022    Assessment & Plan   Andrew Lockwood is a 55 year old male with PMH notable for HIV, prior SBOs and adynamic ileus, DM2, HLD, GERD, chronic abdominal pain, prior toxoplasmosis, history of substance use disorder, PTSD, recent COVID-19 diagnosis (for which he was prescribed Paxlovid) presented with partial small bowel obstruction and concern for gastroenteritis.     Disposition: depends on return of bowel function and tolerance of diet, at least another few days    Partial SBO  - tylenol, toradol for pain  - nausea with emesis overnight, attempted NG but patient refused because it was too uncomfortable, says he will let us try again with premedication if he becomes nauseous and vomits again  - abdomen is soft and relatively non tender without rebound this morning     Diarrhea may be a consequence of COVID-19 infection for which he was recently treated with paxlovid. Could have had some rebound symptoms contributing to SBO. C dif and enteric panel both negative. Crypto/giardia and routine parasitology never happened despite order - no more bowel movements since yesterday in the ED    uncontrolled DM2 Hgb A1c 12.8% - doesn't use insulin outpatient because he thinks it causes his bowel obstructions. Takes metformin at home, held while NPO.  - start lantus 5 unit(s) and continue correction doses; don't want to treat too aggressively given a baseline A1C of 12.8% puts his average glucose in the 300s when outside the hospital and the risk of overtreating and causing hypoglycemia is high     HIV: Continue PTA biktarvy as able. Viral load undetectable in October 2022 and patient maintains adherence with treatment.     Recent COVID-19 infection, resolved  S/p 5 days Paxlovid 12/17-12/21.  No ongoing symptoms including no respiratory symptoms, no fever.       Diet: Advance Diet  as Tolerated: Clear Liquid Diet    DVT Prophylaxis: Enoxaparin (Lovenox) SQ  Potts Catheter: Not present  Central Lines: None  Cardiac Monitoring: None  Code Status: Full Code          Cornelius Byers DO  Hospitalist Service, GOLD TEAM 8  [facility]  Securely message with the Vocera Web Console (learn more here)  Text page via [employer] Paging/Directory   Please see signed in provider for up to date coverage information    ___________________________________________________________________    Interval History     Andrew was sleeping when I came into the room today. He declines NG but says we could try again if he vomits again. He is quite sleepy and would prefer to go back to bed. Abdominal pain is a little better today.    Data reviewed today: I reviewed all medications, new labs and imaging results over the last 24 hours.    Physical Exam   Vital Signs: Temp: 97.2  F (36.2  C) Temp src: Oral BP: 113/72 Pulse: 83   Resp: 16 SpO2: 99 % O2 Device: None (Room air)    Weight: 140 lbs 0 oz    lying in bed in no distress  Sleeping but easily roused, answers questions and follows commands  Respiratory rate and work of breathing are normal  Abdomen is soft, non tender, non distended, no rebound or guarding  Extremities are without edema or cyanosis    Data     CBC RESULTS: Recent Labs   Lab Test 12/25/22  0752   WBC 8.3   RBC 4.15*   HGB 12.5*   HCT 37.0*   MCV 89   MCH 30.1   MCHC 33.8   RDW 12.0

## 2022-12-25 NOTE — PROVIDER NOTIFICATION
"Gold 8 paged \"Pt refusing NG at this time. Denies nausea this AM. Just c/o of abdominal pain still\"    NG can be attempted again if patient has another episode of emesis, pre-medicate with Ativan.   "

## 2022-12-25 NOTE — PROGRESS NOTES
Called re patient being in pain and refusing NG  Per chart review, pt w SBO. He had worsening pain and emesis x 2 in prior shift and plan was for NG  Per my discussion w RN, NG was attempted, however, it was too painful in the nose, so he refused.   Plan was made to give toradol, dilaudid, attempt nasal topical lidocaine and hurricane spray to assist w NG placement  Rpt KUB obtained shows persistent dilated bowel loops    Addendum:  Discussed w RN after KUB results were back. Per her, patient refused all interventions at this time, reports pain was lower, and wanted to sleep.   - Asked RN to call me if recurrent symptoms/HD instability/bedside eval

## 2022-12-25 NOTE — PLAN OF CARE
Time of care: 1900-0730  Reason for admission: SBO    NEURO: A&OX4  RESPIRATORY: denies cough or SOB. sats >90% on RA. Refuses continuous pulse ox.   CARDIAC: denies chest pain. Refused HS vitals  GI/: voiding. No BM. Emesis x2. Team aware.   DIET: ADAT: clears  PAIN/NAUSEA: c/o abdominal pain. Emesis x2 and nausea. Given prn zofran and compazine with some relief.   INCISION/DRAINS/SKIN: attempted to place NG tube, pt began refusing when tube entered nasopharynx. No new deficits.   IV ACCESS: new PIV placed d/t pain w/ flushing.   ACTIVITY: up ad lexi  LAB: reviewed. BGs still >200. Team aware, no new orders.   CHANGES: pt intermittently c/o increased pain, but then appears comfortable. Refused NG placement. Refused VS and several cares.   PLAN: continue w/ POC. Continue to attempt NGT placement.

## 2022-12-25 NOTE — PROVIDER NOTIFICATION
"GOLD cross cover paged \"7b, 0914, Fabián.   Do you want IVMF resumed? Pt is not tolerating PO intake due to nausea/vomiting.   Thanks, Tara Venegas ([facility])\"        "

## 2022-12-26 LAB
ALBUMIN SERPL BCG-MCNC: 3.4 G/DL (ref 3.5–5.2)
ALP SERPL-CCNC: 79 U/L (ref 40–129)
ALT SERPL W P-5'-P-CCNC: 12 U/L (ref 10–50)
ANION GAP SERPL CALCULATED.3IONS-SCNC: 10 MMOL/L (ref 7–15)
AST SERPL W P-5'-P-CCNC: 20 U/L (ref 10–50)
BILIRUB SERPL-MCNC: 0.4 MG/DL
BUN SERPL-MCNC: 10.7 MG/DL (ref 6–20)
CALCIUM SERPL-MCNC: 8.9 MG/DL (ref 8.6–10)
CHLORIDE SERPL-SCNC: 103 MMOL/L (ref 98–107)
CREAT SERPL-MCNC: 0.67 MG/DL (ref 0.67–1.17)
DEPRECATED HCO3 PLAS-SCNC: 24 MMOL/L (ref 22–29)
EBV DNA # SPEC NAA+PROBE: NOT DETECTED COPIES/ML
ERYTHROCYTE [DISTWIDTH] IN BLOOD BY AUTOMATED COUNT: 11.9 % (ref 10–15)
GFR SERPL CREATININE-BSD FRML MDRD: >90 ML/MIN/1.73M2
GLUCOSE BLDC GLUCOMTR-MCNC: 155 MG/DL (ref 70–99)
GLUCOSE BLDC GLUCOMTR-MCNC: 296 MG/DL (ref 70–99)
GLUCOSE BLDC GLUCOMTR-MCNC: 306 MG/DL (ref 70–99)
GLUCOSE BLDC GLUCOMTR-MCNC: 332 MG/DL (ref 70–99)
GLUCOSE BLDC GLUCOMTR-MCNC: 425 MG/DL (ref 70–99)
GLUCOSE BLDC GLUCOMTR-MCNC: 456 MG/DL (ref 70–99)
GLUCOSE SERPL-MCNC: 272 MG/DL (ref 70–99)
HCT VFR BLD AUTO: 35.5 % (ref 40–53)
HGB BLD-MCNC: 12.3 G/DL (ref 13.3–17.7)
MAGNESIUM SERPL-MCNC: 1.7 MG/DL (ref 1.7–2.3)
MCH RBC QN AUTO: 31.2 PG (ref 26.5–33)
MCHC RBC AUTO-ENTMCNC: 34.6 G/DL (ref 31.5–36.5)
MCV RBC AUTO: 90 FL (ref 78–100)
PLATELET # BLD AUTO: 370 10E3/UL (ref 150–450)
POTASSIUM SERPL-SCNC: 3.7 MMOL/L (ref 3.4–5.3)
PROT SERPL-MCNC: 6 G/DL (ref 6.4–8.3)
RBC # BLD AUTO: 3.94 10E6/UL (ref 4.4–5.9)
SODIUM SERPL-SCNC: 137 MMOL/L (ref 136–145)
WBC # BLD AUTO: 6 10E3/UL (ref 4–11)

## 2022-12-26 PROCEDURE — 250N000011 HC RX IP 250 OP 636: Performed by: PEDIATRICS

## 2022-12-26 PROCEDURE — 83735 ASSAY OF MAGNESIUM: CPT | Performed by: PEDIATRICS

## 2022-12-26 PROCEDURE — 80053 COMPREHEN METABOLIC PANEL: CPT

## 2022-12-26 PROCEDURE — 120N000002 HC R&B MED SURG/OB UMMC

## 2022-12-26 PROCEDURE — 250N000013 HC RX MED GY IP 250 OP 250 PS 637

## 2022-12-26 PROCEDURE — 99232 SBSQ HOSP IP/OBS MODERATE 35: CPT | Performed by: PEDIATRICS

## 2022-12-26 PROCEDURE — 36415 COLL VENOUS BLD VENIPUNCTURE: CPT

## 2022-12-26 PROCEDURE — 85027 COMPLETE CBC AUTOMATED: CPT

## 2022-12-26 PROCEDURE — 250N000011 HC RX IP 250 OP 636

## 2022-12-26 RX ADMIN — INSULIN ASPART 3 UNITS: 100 INJECTION, SOLUTION INTRAVENOUS; SUBCUTANEOUS at 14:31

## 2022-12-26 RX ADMIN — KETOROLAC TROMETHAMINE 30 MG: 30 INJECTION, SOLUTION INTRAMUSCULAR; INTRAVENOUS at 03:18

## 2022-12-26 RX ADMIN — PANTOPRAZOLE SODIUM 20 MG: 20 TABLET, DELAYED RELEASE ORAL at 17:57

## 2022-12-26 RX ADMIN — PANTOPRAZOLE SODIUM 20 MG: 20 TABLET, DELAYED RELEASE ORAL at 11:35

## 2022-12-26 RX ADMIN — ENOXAPARIN SODIUM 40 MG: 40 INJECTION SUBCUTANEOUS at 11:37

## 2022-12-26 RX ADMIN — BICTEGRAVIR SODIUM, EMTRICITABINE, AND TENOFOVIR ALAFENAMIDE FUMARATE 1 TABLET: 50; 200; 25 TABLET ORAL at 11:35

## 2022-12-26 RX ADMIN — INSULIN ASPART 2 UNITS: 100 INJECTION, SOLUTION INTRAVENOUS; SUBCUTANEOUS at 11:32

## 2022-12-26 ASSESSMENT — ACTIVITIES OF DAILY LIVING (ADL)
ADLS_ACUITY_SCORE: 22

## 2022-12-26 NOTE — PLAN OF CARE
Temp: 97.2  F (36.2  C) Temp src: Oral BP: 93/62 Pulse: 92   Resp: 18 SpO2: 98 % O2 Device: None (Room air)         Time of care: 1900-0730  Reason for admission: SBO    NEURO: A&Ox4.   RESPIRATORY: denies SOB or cough.   CARDIAC: vss, denies cardiac chest pain.   GI/: voiding, +flatus. +BS.   DIET: clears  PAIN/NAUSEA: denies. Pain controlled w/ prn toradol x1.   INCISION/DRAINS/SKIN: no new deficits.   IV ACCESS: PIV SL  ACTIVITY: up ad lexi. Ambulated in halls without asking staff.   LAB: reviewed.   CHANGES: pt status improving, but still no BM.   PLAN: continue w/ POC.

## 2022-12-26 NOTE — PROGRESS NOTES
Patient was sleeping at 0800. Writer came into room 0850 and patient stated he wants to fire Aid. Writer asked why and he stated he did not want her again and writer stated we only had one other Aid and patient tod writer he was fired. Lab came into room for lab draw and patient left the room so no labs were taken. Patient is off the unit with location unknown.

## 2022-12-26 NOTE — PROGRESS NOTES
[facility]    Medicine Progress Note - Hospitalist Service, GOLD TEAM 8    Date of Admission:  12/23/2022    Assessment & Plan   Andrew Lockwood is a 55 year old male with PMH notable for HIV, prior SBOs and adynamic ileus, DM2, HLD, GERD, chronic abdominal pain, prior toxoplasmosis, history of substance use disorder, PTSD, recent COVID-19 diagnosis (for which he was prescribed Paxlovid) presented with partial small bowel obstruction and concern for gastroenteritis.     Disposition: depends on return of bowel function and tolerance of diet, at least another few days    Partial SBO  - tylenol, toradol for pain; declined NG after vomiting last night and doesn't want it unless he vomits again  - reports no bm since 12/24 but passing gas (most recently around 5 am he thinks), abdominal pain is better, denies nausea at the moment, belly is soft without rebound, mildly tender RLQ   - will hit 72 hours tonight/tomorrow, would pursue gastrograffin admin vs surgery consult vs both    Diarrhea may be a consequence of COVID-19 infection for which he was recently treated with paxlovid. Could have had some rebound symptoms contributing to SBO. C dif and enteric panel both negative. Crypto/giardia and routine parasitology never happened despite order - no bm's since 12/24 in ED    uncontrolled DM2 Hgb A1c 12.8% - doesn't use insulin outpatient because he thinks it causes his bowel obstructions. Takes metformin at home, held while NPO.  - increase lantus to 10 units and continue correction doses; don't want to treat too aggressively given a baseline A1C of 12.8% puts his average glucose in the 300s when outside the hospital and the risk of overtreating and causing hypoglycemia is high; not eating, just drinking water right now     HIV: Continue PTA biktarvy as able. Viral load undetectable in October 2022 and patient maintains adherence with treatment.     Recent COVID-19 infection,  resolved  S/p 5 days Paxlovid 12/17-12/21.  No ongoing symptoms including no respiratory symptoms, no fever.       Diet: Advance Diet as Tolerated: Clear Liquid Diet    DVT Prophylaxis: Enoxaparin (Lovenox) SQ  Potts Catheter: Not present  Central Lines: None  Cardiac Monitoring: None  Code Status: Full Code          Cornelius Byers DO  Hospitalist Service, GOLD TEAM 8  [facility]  Securely message with the Vocera Web Console (learn more here)  Text page via [employer] Paging/Directory   Please see signed in provider for up to date coverage information    ___________________________________________________________________    Interval History     Andrew was upset this morning - I ran into him in the hallway as he was leaving the unit. He says he was called a slur by either the nurse or an aid. He denies abdominal pain right now and says he has not vomited. He has not had a bowel movement since the ED. He says he has been passing gas as recently as this morning. He is ambulating without difficulty.    Data reviewed today: I reviewed all medications, new labs and imaging results over the last 24 hours.    Physical Exam   Vital Signs: Temp: 97  F (36.1  C) Temp src: Oral BP: 119/74 Pulse: 77   Resp: 18 SpO2: 98 % O2 Device: None (Room air)    Weight: 140 lbs 0 oz    Ambulating without difficulty  Speech is coherent, thought process linear  Abdomen is soft, non distended, there is some RLQ tenderness with palpation, no rebound  Awake, alert, follows commands  Work of breathing and RR are normal    Data     CBC RESULTS: Recent Labs   Lab Test 12/25/22  0752   WBC 8.3   RBC 4.15*   HGB 12.5*   HCT 37.0*   MCV 89   MCH 30.1   MCHC 33.8   RDW 12.0

## 2022-12-27 ENCOUNTER — APPOINTMENT (OUTPATIENT)
Dept: GENERAL RADIOLOGY | Facility: CLINIC | Age: 57
End: 2022-12-27
Attending: INTERNAL MEDICINE
Payer: COMMERCIAL

## 2022-12-27 LAB
ALBUMIN SERPL BCG-MCNC: 3.2 G/DL (ref 3.5–5.2)
ALP SERPL-CCNC: 124 U/L (ref 40–129)
ALT SERPL W P-5'-P-CCNC: 11 U/L (ref 10–50)
ANION GAP SERPL CALCULATED.3IONS-SCNC: 12 MMOL/L (ref 7–15)
AST SERPL W P-5'-P-CCNC: 17 U/L (ref 10–50)
BILIRUB SERPL-MCNC: 0.3 MG/DL
BUN SERPL-MCNC: 10 MG/DL (ref 6–20)
CALCIUM SERPL-MCNC: 8.5 MG/DL (ref 8.6–10)
CHLORIDE SERPL-SCNC: 100 MMOL/L (ref 98–107)
CREAT SERPL-MCNC: 0.61 MG/DL (ref 0.67–1.17)
DEPRECATED HCO3 PLAS-SCNC: 22 MMOL/L (ref 22–29)
ERYTHROCYTE [DISTWIDTH] IN BLOOD BY AUTOMATED COUNT: 11.9 % (ref 10–15)
GFR SERPL CREATININE-BSD FRML MDRD: >90 ML/MIN/1.73M2
GLUCOSE BLDC GLUCOMTR-MCNC: 155 MG/DL (ref 70–99)
GLUCOSE BLDC GLUCOMTR-MCNC: 191 MG/DL (ref 70–99)
GLUCOSE BLDC GLUCOMTR-MCNC: 266 MG/DL (ref 70–99)
GLUCOSE BLDC GLUCOMTR-MCNC: 313 MG/DL (ref 70–99)
GLUCOSE BLDC GLUCOMTR-MCNC: 381 MG/DL (ref 70–99)
GLUCOSE BLDC GLUCOMTR-MCNC: 388 MG/DL (ref 70–99)
GLUCOSE SERPL-MCNC: 366 MG/DL (ref 70–99)
HCT VFR BLD AUTO: 32.5 % (ref 40–53)
HGB BLD-MCNC: 11.5 G/DL (ref 13.3–17.7)
MAGNESIUM SERPL-MCNC: 1.6 MG/DL (ref 1.7–2.3)
MCH RBC QN AUTO: 31.6 PG (ref 26.5–33)
MCHC RBC AUTO-ENTMCNC: 35.4 G/DL (ref 31.5–36.5)
MCV RBC AUTO: 89 FL (ref 78–100)
PLATELET # BLD AUTO: 338 10E3/UL (ref 150–450)
POTASSIUM SERPL-SCNC: 3.7 MMOL/L (ref 3.4–5.3)
PROT SERPL-MCNC: 5.7 G/DL (ref 6.4–8.3)
RBC # BLD AUTO: 3.64 10E6/UL (ref 4.4–5.9)
SODIUM SERPL-SCNC: 134 MMOL/L (ref 136–145)
WBC # BLD AUTO: 6.8 10E3/UL (ref 4–11)

## 2022-12-27 PROCEDURE — 120N000002 HC R&B MED SURG/OB UMMC

## 2022-12-27 PROCEDURE — 80053 COMPREHEN METABOLIC PANEL: CPT

## 2022-12-27 PROCEDURE — 74018 RADEX ABDOMEN 1 VIEW: CPT | Mod: 26 | Performed by: RADIOLOGY

## 2022-12-27 PROCEDURE — 250N000013 HC RX MED GY IP 250 OP 250 PS 637: Performed by: HOSPITALIST

## 2022-12-27 PROCEDURE — 250N000011 HC RX IP 250 OP 636

## 2022-12-27 PROCEDURE — 36415 COLL VENOUS BLD VENIPUNCTURE: CPT

## 2022-12-27 PROCEDURE — 74018 RADEX ABDOMEN 1 VIEW: CPT

## 2022-12-27 PROCEDURE — 250N000013 HC RX MED GY IP 250 OP 250 PS 637

## 2022-12-27 PROCEDURE — 83735 ASSAY OF MAGNESIUM: CPT | Performed by: PEDIATRICS

## 2022-12-27 PROCEDURE — 99232 SBSQ HOSP IP/OBS MODERATE 35: CPT | Performed by: INTERNAL MEDICINE

## 2022-12-27 PROCEDURE — 85027 COMPLETE CBC AUTOMATED: CPT

## 2022-12-27 RX ORDER — IBUPROFEN 200 MG
200 TABLET ORAL ONCE
Status: COMPLETED | OUTPATIENT
Start: 2022-12-27 | End: 2022-12-27

## 2022-12-27 RX ADMIN — MUPIROCIN: 20 OINTMENT TOPICAL at 08:20

## 2022-12-27 RX ADMIN — PANTOPRAZOLE SODIUM 20 MG: 20 TABLET, DELAYED RELEASE ORAL at 15:53

## 2022-12-27 RX ADMIN — PANTOPRAZOLE SODIUM 20 MG: 20 TABLET, DELAYED RELEASE ORAL at 06:30

## 2022-12-27 RX ADMIN — ENOXAPARIN SODIUM 40 MG: 40 INJECTION SUBCUTANEOUS at 08:18

## 2022-12-27 RX ADMIN — IBUPROFEN 200 MG: 200 TABLET, FILM COATED ORAL at 01:23

## 2022-12-27 RX ADMIN — BICTEGRAVIR SODIUM, EMTRICITABINE, AND TENOFOVIR ALAFENAMIDE FUMARATE 1 TABLET: 50; 200; 25 TABLET ORAL at 08:18

## 2022-12-27 RX ADMIN — DIATRIZOATE MEGLUMINE AND DIATRIZOATE SODIUM 75 ML: 660; 100 SOLUTION ORAL; RECTAL at 10:08

## 2022-12-27 ASSESSMENT — ACTIVITIES OF DAILY LIVING (ADL)
ADLS_ACUITY_SCORE: 22
DEPENDENT_IADLS:: INDEPENDENT

## 2022-12-27 NOTE — PROGRESS NOTES
[facility]    Medicine Progress Note - Hospitalist Service, GOLD TEAM 8    Date of Admission:  12/23/2022    Assessment & Plan   Andrew Lockwood is a 55 year old male with PMH notable for HIV, prior SBOs and adynamic ileus, DM2, HLD, GERD, chronic abdominal pain, prior toxoplasmosis, history of substance use disorder, PTSD, recent COVID-19 diagnosis (for which he was prescribed Paxlovid) presented with partial small bowel obstruction and concern for gastroenteritis.    Changes Today:  - Still no BM this am, so gastrograffin study ordered to further assess -> drank solution and then had a BM, will proceed with imaging regardless for better assessment   - Pending results of study, will continue to advance diet as tolerated    Partial SBO  Demonstrated on CT on admission, along with enteritis. Treated with tylenol, toradol for pain; declined NG even after vomiting, though has not had emesis for >24 hours. Over course of admission started having flatus but still no BM since admission 12/24. Was tolerating some CLD without pain or nausea   - Gastrografin imaging study ordered, see above     Diarrhea   May be a consequence of COVID-19 infection for which he was recently treated with paxlovid. Could have had some rebound symptoms contributing to SBO. C dif and enteric panel both negative. Crypto/giardia and routine parasitology never happened despite order - no bm's since 12/24 in ED    Uncontrolled DM2 Hgb A1c 12.8%d  Desn't use insulin outpatient because he thinks it causes his bowel obstructions. Takes metformin at home, held while NPO.  - increase lantus to 10 units and continue correction doses; don't want to treat too aggressively given a baseline A1C of 12.8% puts his average glucose in the 300s when outside the hospital and the risk of overtreating and causing hypoglycemia is high; not eating, just drinking water right now. Will need to have a discussion with patient  about what other treatments he would consider.      HIV: Continue PTA biktarvy as able. Viral load undetectable in October 2022 and patient maintains adherence with treatment.     Recent COVID-19 infection, resolved  S/p 5 days Paxlovid 12/17-12/21.  No ongoing symptoms including no respiratory symptoms, no fever.       Diet: Advance Diet as Tolerated: Clear Liquid Diet    DVT Prophylaxis: Enoxaparin (Lovenox) SQ  Potts Catheter: Not present  Central Lines: None  Cardiac Monitoring: None  Code Status: Full Code          Dawn Haq MD  Hospitalist Service, GOLD TEAM 8  M [facility]  Securely message with the Vocera Web Console (learn more here)  Text page via [facility] Paging/Directory   Please see signed in provider for up to date coverage information    ___________________________________________________________________    Interval History     No acute events overnight, states that after he drank the solution for the gastrografin this morning he had a BM and now feels better. Is starting to feel hungry, abd pain minimal. No other concerns or questions today.     Data reviewed today: I reviewed all medications, new labs and imaging results over the last 24 hours.    Physical Exam   Vital Signs: Temp: 97.6  F (36.4  C) Temp src: Oral BP: 117/58 Pulse: 79   Resp: 18 SpO2: 99 % O2 Device: None (Room air)    Weight: 140 lbs 0 oz    Ambulating without difficulty, laying in bed  Speech is coherent, thought process linear  CV RRR,  No murmurs  Lungs CTAB, non labored respirations  Abdomen is soft, non distended, there is some RLQ tenderness with palpation, no rebound  No LE edema  Alert and oriented       Data     CBC RESULTS: Recent Labs   Lab Test 12/25/22  0752   WBC 8.3   RBC 4.15*   HGB 12.5*   HCT 37.0*   MCV 89   MCH 30.1   MCHC 33.8   RDW 12.0

## 2022-12-27 NOTE — PHARMACY-ADMISSION MEDICATION HISTORY
"  Admission Medication History Completed by Pharmacy    See [facility] Admission Navigator for allergy information, preferred outpatient pharmacy, prior to admission medications and immunization status.     Medication History Sources:     Patient, chart review, sure script fill history.    Patient reliable.    Changes made to PTA medication list (reason):    Added: None    Deleted: paxlovid, completed regimen 12/21.    Changed: None    Additional Information:    HIV viral load undetectable in 10/2022. Adherence is good.    Does not use insulin at home, fears that it is giving him SBOs. Filled in November with last discharge, hasn't been filled since January prior to that. A1C last reported 12.8%. Reported good adherence to metformin.    Prior to Admission medications    Medication Sig Last Dose Taking? Auth Provider Long Term End Date   albuterol (PROAIR HFA/PROVENTIL HFA/VENTOLIN HFA) 108 (90 Base) MCG/ACT inhaler Inhale 2 puffs into the lungs every 6 hours as needed for shortness of breath, wheezing or cough  Yes Sachin Rader MD Yes    Alcohol Swabs PADS 1 swab daily  Yes Maya Betts PA-C     bictegravir-emtricitabine-tenofovir (BIKTARVY) -25 MG per tablet Take 1 tablet by mouth daily 12/23/2022 Yes Maya Betts PA-C     Gauze Pads & Dressings (GAUZE PADS 4\"X4\") 4\"X4\" PADS 2 each daily  Yes Maya Betts PA-C     insulin detemir (LEVEMIR PEN) 100 UNIT/ML pen Inject 17 Units Subcutaneous every 24 hours Unknown Yes Maya Betts PA-C Yes    insulin pen needle (32G X 4 MM) 32G X 4 MM miscellaneous Use pen needles three times daily before meals and also before bed.  Yes Maya Betts PA-C     metFORMIN (GLUCOPHAGE XR) 500 MG 24 hr tablet Take 3 tablets (1,500 mg) by mouth daily (with dinner) With food 12/22/2022 Yes Maya Betts PA-C Yes    mupirocin (BACTROBAN) 2 % external ointment Apply topically 3 times daily  Yes Maya Betts PA-C     pantoprazole " (PROTONIX) 20 MG EC tablet Take 1 tablet (20 mg) by mouth 2 times daily 12/23/2022 Yes Ruel Cloud MD     sterile water, bottle, irrigation Irrigate with 1,000 mLs as directed daily  Yes Maya Betts PA-C     NOVOLOG FLEXPEN 100 UNIT/ML soln Inject 5 Units Subcutaneous 3 times daily (with meals)   Parish Lopez DO Yes 5/29/22       Date completed: 12/27/22    Medication history completed by: Yolanda Prescott, PharmD

## 2022-12-27 NOTE — PROGRESS NOTES
Care Management Initial Consult    General Information  Assessment completed with: Patient,    Type of CM/SW Visit: Initial Assessment    Primary Care Provider verified and updated as needed: Yes   Readmission within the last 30 days: previous discharge plan unsuccessful      Reason for Consult: discharge planning  Advance Care Planning: Advance Care Planning Reviewed: no concerns identified          Communication Assessment  Patient's communication style: spoken language (English or Bilingual)    Hearing Difficulty or Deaf: no   Wear Glasses or Blind: no    Cognitive  Cognitive/Neuro/Behavioral: WDL                      Living Environment:   People in home: alone     Current living Arrangements: shelter, homeless  Name of Facility: [facility]   Able to return to prior arrangements: yes       Family/Social Support:  Care provided by: self  Provides care for: no one, unable/limited ability to care for self  Marital Status: Single  Other (specify) (shelter staff)          Description of Support System: Supportive    Support Assessment: Limited social contact and support    Current Resources:   Patient receiving home care services: No     Community Resources: Other (see comment) (Homeless shelter)  Equipment currently used at home: none  Supplies currently used at home: None    Employment/Financial:  Employment Status: unemployed        Financial Concerns: No concerns identified   Referral to Financial Worker: No       Lifestyle & Psychosocial Needs:  Social Determinants of Health     Tobacco Use: High Risk     Smoking Tobacco Use: Some Days     Smokeless Tobacco Use: Former     Passive Exposure: Not on file   Alcohol Use: Not on file   Financial Resource Strain: Not on file   Food Insecurity: Not on file   Transportation Needs: Not on file   Physical Activity: Not on file   Stress: Not on file   Social Connections: Not on file   Intimate Partner Violence: Not on file   Depression: Not at risk     PHQ-2 Score: 0    Housing Stability: Not on file       Functional Status:  Prior to admission patient needed assistance:   Dependent ADLs:: Independent  Dependent IADLs:: Independent       Mental Health Status:  Mental Health Status: No Current Concerns       Chemical Dependency Status:                Values/Beliefs:  Spiritual, Cultural Beliefs, Sabianist Practices, Values that affect care: no               Additional Information:  Patient currently homeless and is residing at [facility]. He currently does not have a support system except the staff at [facility]. His  is Hallie Valdes and she can be reached at [phone]. She was made aware of his COVID status. In order for the patient to return to the shelter he must have a negative test. Per Infection Prevention patient must remain in hospital for 20 days.     Provided Hallie with [facility] contact information should any questions arise    Valeria Renee RN, BSN, CCRN

## 2022-12-27 NOTE — CARE PLAN
Time: 6191-8091  Activity: up ad lexi   Pain: Denies pain.  Neuro: A/O x4, denies N/T   Cardiac: Denies cardiac chest pain.   Respiratory: Denies SOB, on RA.  GI/: no BM this shift, reports he's also not passing gas.  Diet: tolerating full liquid   Lines: Left PIV SL.  Incisions/Drains/Skin: WDL.  Lab: Reviewed   New changes this shift: No significant changes this shift, Continue POC

## 2022-12-27 NOTE — PLAN OF CARE
Goal Outcome Evaluation:      Plan of Care Reviewed With: patient    Overall Patient Progress: no change     Time: 6446-7618  Reason for admission: SBO.  Activity: up ad lexi.   Pain: c/o headache one time dose of ibuprofen with relief.   Neuro: A&O x4. CMS intact.   Cardiac: WDL. Denies chest pain.   Respiratory: LS clear and equal. RA. Denies cough or SOB.  GI/: Voiding spontaneously. Faint BS, +flatus, no BM.   Diet: Clears  Lines:  L PIV SL  Skin: No new skin issues noted  Drains: None   Lab:  and 313, insulin per sliding scale   New changes this shift: pt requested ibuprofen for headache, Shawn Hoyos paged @0014, new order placed.   Plan: Continue POC.

## 2022-12-27 NOTE — PROVIDER NOTIFICATION
"Page to MD Canela @ 8356    \"FYI, pts BG @2200 was 567. 2nd check came back 457. Max 12 U given per SS. Will recheck BG ~ 2330. Thanks!\"     Updated page to MD Hoyos @ 0894    \"FYI 12 U insulin aspart given @ 2215 for BG of 456. Recheck @ 2330 was 332. Will recheck @ 0200 .Thanks!\"   "

## 2022-12-27 NOTE — PLAN OF CARE
Activity: up ad lexi    Neuros:alert and oriented x 4  Cardiac:denies chest pain  Respiratory:room air  GI/:Gastrogaffin challenge given at 10:30 (xray at 1830) 400 mL BM, voiding   Diet:NPO until after xray, blood glucose checks (patient refused noon insulin )  Skin:patient had complaints of wound on shaft of penis - no wounds noted by writer - service notified  Lines/Drains:PIV saline locked  Plan:xray at 1830

## 2022-12-27 NOTE — PROGRESS NOTES
Patient's labs were drawn and blood sugar POC taken at 11:25.  He was given 3 units per sliding scale protocol and 10 units NPH.  The next glucose was 425 he was given 3units and MD was notified in which the sliding scale was increased to high intensity which called for 12 units.  He was given an additional 9 units to equal the 12 units.  T  The patient report coughing up green sputum which was not witnessed.  He has tolerated several trays of clear liquids without nausea.  The patient reports he has not had bowel movements yet.

## 2022-12-28 VITALS
HEART RATE: 71 BPM | TEMPERATURE: 96.9 F | SYSTOLIC BLOOD PRESSURE: 100 MMHG | WEIGHT: 140 LBS | RESPIRATION RATE: 16 BRPM | HEIGHT: 63 IN | OXYGEN SATURATION: 100 % | DIASTOLIC BLOOD PRESSURE: 60 MMHG | BODY MASS INDEX: 24.8 KG/M2

## 2022-12-28 LAB
ALBUMIN SERPL BCG-MCNC: 3.2 G/DL (ref 3.5–5.2)
ALP SERPL-CCNC: 94 U/L (ref 40–129)
ALT SERPL W P-5'-P-CCNC: 15 U/L (ref 10–50)
ANION GAP SERPL CALCULATED.3IONS-SCNC: 11 MMOL/L (ref 7–15)
AST SERPL W P-5'-P-CCNC: 18 U/L (ref 10–50)
BILIRUB SERPL-MCNC: 0.4 MG/DL
BUN SERPL-MCNC: 7.6 MG/DL (ref 6–20)
CALCIUM SERPL-MCNC: 9 MG/DL (ref 8.6–10)
CHLORIDE SERPL-SCNC: 103 MMOL/L (ref 98–107)
CREAT SERPL-MCNC: 0.73 MG/DL (ref 0.67–1.17)
DEPRECATED HCO3 PLAS-SCNC: 23 MMOL/L (ref 22–29)
ERYTHROCYTE [DISTWIDTH] IN BLOOD BY AUTOMATED COUNT: 12.1 % (ref 10–15)
GFR SERPL CREATININE-BSD FRML MDRD: >90 ML/MIN/1.73M2
GLUCOSE BLDC GLUCOMTR-MCNC: 234 MG/DL (ref 70–99)
GLUCOSE BLDC GLUCOMTR-MCNC: 247 MG/DL (ref 70–99)
GLUCOSE BLDC GLUCOMTR-MCNC: 339 MG/DL (ref 70–99)
GLUCOSE BLDC GLUCOMTR-MCNC: 408 MG/DL (ref 70–99)
GLUCOSE BLDC GLUCOMTR-MCNC: 423 MG/DL (ref 70–99)
GLUCOSE SERPL-MCNC: 290 MG/DL (ref 70–99)
HCT VFR BLD AUTO: 34.7 % (ref 40–53)
HGB BLD-MCNC: 12.1 G/DL (ref 13.3–17.7)
MAGNESIUM SERPL-MCNC: 1.7 MG/DL (ref 1.7–2.3)
MCH RBC QN AUTO: 31.1 PG (ref 26.5–33)
MCHC RBC AUTO-ENTMCNC: 34.9 G/DL (ref 31.5–36.5)
MCV RBC AUTO: 89 FL (ref 78–100)
PLATELET # BLD AUTO: 327 10E3/UL (ref 150–450)
POTASSIUM SERPL-SCNC: 3.7 MMOL/L (ref 3.4–5.3)
PROT SERPL-MCNC: 5.6 G/DL (ref 6.4–8.3)
RBC # BLD AUTO: 3.89 10E6/UL (ref 4.4–5.9)
SODIUM SERPL-SCNC: 137 MMOL/L (ref 136–145)
WBC # BLD AUTO: 6.7 10E3/UL (ref 4–11)

## 2022-12-28 PROCEDURE — 99232 SBSQ HOSP IP/OBS MODERATE 35: CPT | Performed by: INTERNAL MEDICINE

## 2022-12-28 PROCEDURE — 82310 ASSAY OF CALCIUM: CPT

## 2022-12-28 PROCEDURE — 36415 COLL VENOUS BLD VENIPUNCTURE: CPT

## 2022-12-28 PROCEDURE — 83735 ASSAY OF MAGNESIUM: CPT | Performed by: PEDIATRICS

## 2022-12-28 PROCEDURE — 250N000013 HC RX MED GY IP 250 OP 250 PS 637

## 2022-12-28 PROCEDURE — 82374 ASSAY BLOOD CARBON DIOXIDE: CPT

## 2022-12-28 PROCEDURE — 250N000011 HC RX IP 250 OP 636

## 2022-12-28 PROCEDURE — 120N000002 HC R&B MED SURG/OB UMMC

## 2022-12-28 PROCEDURE — 85027 COMPLETE CBC AUTOMATED: CPT

## 2022-12-28 RX ADMIN — ENOXAPARIN SODIUM 40 MG: 40 INJECTION SUBCUTANEOUS at 08:25

## 2022-12-28 RX ADMIN — BICTEGRAVIR SODIUM, EMTRICITABINE, AND TENOFOVIR ALAFENAMIDE FUMARATE 1 TABLET: 50; 200; 25 TABLET ORAL at 08:25

## 2022-12-28 RX ADMIN — PANTOPRAZOLE SODIUM 20 MG: 20 TABLET, DELAYED RELEASE ORAL at 08:25

## 2022-12-28 RX ADMIN — PANTOPRAZOLE SODIUM 20 MG: 20 TABLET, DELAYED RELEASE ORAL at 17:02

## 2022-12-28 ASSESSMENT — ACTIVITIES OF DAILY LIVING (ADL)
ADLS_ACUITY_SCORE: 22
ADLS_ACUITY_SCORE: 22
ADLS_ACUITY_SCORE: 26
ADLS_ACUITY_SCORE: 22
ADLS_ACUITY_SCORE: 26
ADLS_ACUITY_SCORE: 22
ADLS_ACUITY_SCORE: 22
ADLS_ACUITY_SCORE: 26
ADLS_ACUITY_SCORE: 22
ADLS_ACUITY_SCORE: 26

## 2022-12-28 NOTE — PLAN OF CARE
Vitals: Temp: 96.9  F (36.1  C) Temp src: Oral BP: 100/60 Pulse: 71   Resp: 16 SpO2: 100 % O2 Device: None (Room air)        Time: 0397-1040  Reason for admission: SBO.  Activity:  up ad lexi.   Pain:  Denies.  Neuro: A&O x4. CMS intact.   Cardiac: WDL. Denies chest pain.   Respiratory:  LS clear and equal. RA. Denies cough or SOB.  GI/: Voiding spontaneously. LBM 12/27. +flatus.   Diet: Low fiber, tolerating well.   Lines:  L PIV infusing LR @75 ml/hr.   Incisions/Drains/Skin:  Refused full skin assessment, only able to visualize extremities. Pt reports no skin issues.   Lab:  BG = 234, 247.  Electrolyte Replacements: Not indicated.      New changes this shift:  Continue POC

## 2022-12-28 NOTE — PROVIDER NOTIFICATION
"\"[id number] Andrew Lockwood   pt blood sugar was 388.10 units of insulin was administered.    -Parul Michelle\".  "

## 2022-12-28 NOTE — PROVIDER NOTIFICATION
"GOLD 8 paged \"7b, [phone], Fabián.   Can pt come off COVID iso? It's been 10 days and pt getting frustrated at staff for telling him to stay in his room.  Thanks, Tara Venegas (vocBarnum)\"    Per I&D recs, pt needs to stay on isolation for 20 days.   "

## 2022-12-28 NOTE — PROGRESS NOTES
[facility]    Medicine Progress Note - Hospitalist Service, GOLD TEAM 8    Date of Admission:  12/23/2022    Assessment & Plan   Andrew Lockwood is a 55 year old male with PMH notable for HIV, prior SBOs and adynamic ileus, DM2, HLD, GERD, chronic abdominal pain, prior toxoplasmosis, history of substance use disorder, PTSD, recent COVID-19 diagnosis (for which he was prescribed Paxlovid) presented with partial small bowel obstruction and concern for gastroenteritis.    Changes Today:  - Gastrografin study done 12/27 showed resolution of obstruction, pt continuing to pass gas and has had 2 bowel movements  - Advanced to low res diet this am, tolerated lunch without symptoms, requested regular diet for dinner  - BGs remain very high, pt intermittently refusing insulin  - COVID precautions must remain in place for 20 days per infection control due to rules for return to shelter, pt educated regarding these guidelines, will remain inpt until able to return to shelter    Partial SBO - resolved  Demonstrated on CT on admission, along with enteritis. Treated with tylenol, toradol for pain; declined NG even after vomiting, though has not had emesis for >24 hours. Over course of admission started having flatus but still no BM since admission 12/24. Was tolerating some CLD without pain or nausea   - Gastrografin study done 12/27 showed resolution of obstruction, pt continuing to pass gas and has had 2 bowel movements  - Advanced to low res diet this am 12/28, tolerated lunch without symptoms, requested regular diet for dinner    Diarrhea - resolved  May be a consequence of COVID-19 infection for which he was recently treated with paxlovid. Could have had some rebound symptoms contributing to SBO. C dif and enteric panel both negative. Crypto/giardia and routine parasitology never happened despite order, canceled d/t decreased BMs.     Uncontrolled DM2 Hgb A1c 12.8%d  Desn't use  insulin outpatient because he thinks it causes his bowel obstructions. Takes metformin at home, held while NPO.  - increase lantus to 10 units and continue correction doses; don't want to treat too aggressively given a baseline A1C of 12.8% puts his average glucose in the 300s when outside the hospital and the risk of overtreating and causing hypoglycemia is high; not eating, just drinking water right now. Will need to have a discussion with patient about what other treatments he would consider.  - Intermittent refusing insulin, if remains stable will resume Metformin 12/29       HIV: Continue PTA biktarvy as able. Viral load undetectable in October 2022 and patient maintains adherence with treatment.     Recent COVID-19 infection, resolved  S/p 5 days Paxlovid 12/17-12/21.  No ongoing symptoms including no respiratory symptoms, no fever.       Diet: Regular Diet Adult    DVT Prophylaxis: Enoxaparin (Lovenox) SQ  Potts Catheter: Not present  Central Lines: None  Cardiac Monitoring: None  Code Status: Full Code          Dawn Haq MD  Hospitalist Service, GOLD TEAM [address] [place] [address]  Securely message with the Vocera Web Console (learn more here)  Text page via [facility] Paging/Directory   Please see signed in provider for up to date coverage information    ___________________________________________________________________    Interval History     No acute events overnight, states that he has had 2 BMs yesterday and overnight. Is eager to advance diet. Upset he is still on covid precautions and cant levae his room. Explained again reasoning behind this. Denies any other symptoms.     Data reviewed today: I reviewed all medications, new labs and imaging results over the last 24 hours.    Physical Exam   Vital Signs: Temp: 96.9  F (36.1  C) Temp src: Oral BP: 100/60 Pulse: 71   Resp: 16 SpO2: 100 % O2 Device: None (Room air)    Weight: 140 lbs 0 oz    Ambulating without  difficulty, laying in bed  Speech is coherent, thought process linear  CV RRR,  No murmurs  Lungs CTAB, non labored respirations  Abdomen is soft, non distended, there is some RLQ tenderness with palpation, no rebound  No LE edema  Alert and oriented       Data     CBC RESULTS: Recent Labs   Lab Test 12/25/22  0752   WBC 8.3   RBC 4.15*   HGB 12.5*   HCT 37.0*   MCV 89   MCH 30.1   MCHC 33.8   RDW 12.0

## 2022-12-28 NOTE — PLAN OF CARE
Temp: 97.1  F (36.2  C) Temp src: Oral BP: 122/66 Pulse: 80   Resp: 17 SpO2: 98 % O2 Device: None (Room air)       Time of care: 2300-0730  Reason for admission: SBO     NEURO: A&Ox4.   RESPIRATORY: denies SOB or cough.   CARDIAC: vss, denies cardiac chest pain.   GI/: voiding, +flatus. +BS.   DIET: tolerating full liquids  PAIN/NAUSEA: denies. INCISION/DRAINS/SKIN: no new deficits.   IV ACCESS: PIV SL  ACTIVITY: up ad lexi. Ambulated in halls without asking staff.   LAB: reviewed. , MD notified and sliding scale given per order.    CHANGES: pt status improved. BG elevated, team aware.   PLAN: continue w/ POC.

## 2022-12-29 ASSESSMENT — ACTIVITIES OF DAILY LIVING (ADL)
ADLS_ACUITY_SCORE: 22
ADLS_ACUITY_SCORE: 22

## 2022-12-29 NOTE — PLAN OF CARE
Pt not on unit since 12/28 at 2100. No belongings found in pt's room. Team aware. Pt will be considered discharged at 0500.

## 2022-12-29 NOTE — PLAN OF CARE
"/60 (BP Location: Left arm)   Pulse 71   Temp 96.9  F (36.1  C) (Oral)   Resp 16   Ht 1.6 m (5' 3\")   Wt 63.5 kg (140 lb)   SpO2 100%   BMI 24.80 kg/m      Respiratory: WDL. O2 >99% on RA. Denies SOB.   Cardiac: WDL. Denies cardiac chest pain.   Neuro: A&OX4. Pt able to make needs known. Pt none compliant about staying in his room since he is Covid positive.   GI/: Soft and nontender. Voids spontaneously, AUOP. 1 BM on this shift.   Diet: Regular.   Skin: No new deficit.   Lines/drains: L PIV was infusing LR @75mL/hr until pt unhooked himself. Pt's IV pole found in the male public bathroom. Pt has been gone from the unit since 2100.  Pain: Denies.   Labs: Reviewed. Last . Pt refused 2000 BG checks and insulin.   Activity: Up ad lexi.  Plan: Continue to monitor and follow POC.     "

## 2022-12-29 NOTE — PLAN OF CARE
Pt still not back on unit after 8 hours. Team notified and pt discharged automatically per policy.

## 2022-12-30 ENCOUNTER — TELEPHONE (OUTPATIENT)
Dept: SURGERY | Facility: CLINIC | Age: 57
End: 2022-12-30

## 2022-12-30 NOTE — TELEPHONE ENCOUNTER
No number for calling, no mychart. Will mail letter for scheduling:  With: Carrie Mary NP   Referring Provider: self   For / Appt Notes: micro   Appt Type: Microscopy   Appt Date/Time: next available

## 2023-01-03 NOTE — DISCHARGE SUMMARY
[facility]  Hospitalist Discharge Summary      Date of Admission:  12/23/2022  Date of Discharge:  12/29/2022  5:00 AM  Discharging Provider: Dawn Haq MD  Discharge Service: Hospitalist Service, GOLD TEAM 8    Discharge Diagnoses   Partial SBO - resolved  Diarrhea - resolved  Uncontrolled DM2 Hgb A1c 12.8%  Recent COVID-19 infection, resolved  HIV - present on admission    Follow-ups Needed After Discharge   Attempted to call patient to direct him to call his PCP and set up follow-up appt within 2 weeks, unable to get a hold of patient.     Unresulted Labs Ordered in the Past 30 Days of this Admission     No orders found from 11/23/2022 to 12/24/2022.        Discharge Disposition   Pt was not seen by anyone on unit since 12/28 at 2100. No belongings found in pt's room. Team aware. Pt will be considered discharged at 0500. As of 0500 on 12/29, pt still not there, unreachable by phone, considered discharged. Left AMA. Plan had been to discharge to prior shelter but was awaiting covid clearance.     Condition at discharge: Stable      Hospital Course   Andrew Lockwood is a 55 year old male with PMH notable for HIV, prior SBOs and adynamic ileus, DM2, HLD, GERD, chronic abdominal pain, prior toxoplasmosis, history of substance use disorder, PTSD, recent COVID-19 diagnosis (for which he was prescribed Paxlovid) presented with partial small bowel obstruction and concern for gastroenteritis.     Partial SBO - resolved  Demonstrated on CT on admission, along with enteritis. Treated with tylenol, toradol for pain; declined NG even after vomiting, though has not had emesis for >24 hours. Over course of admission started having flatus but still no BM since admission 12/24. Was tolerating some CLD without pain or nausea. Gastrografin study done 12/27 showed resolution of obstruction, pt continuing to pass gas and has had 2 bowel movements. Advanced to low res diet this am  12/28, tolerated lunch without symptoms, requested regular diet for dinner before he left the hospital.      Diarrhea - resolved  May be a consequence of COVID-19 infection for which he was recently treated with paxlovid. Could have had some rebound symptoms contributing to SBO. C dif and enteric panel both negative. Crypto/giardia and routine parasitology never happened despite order, canceled d/t decreased BMs.      Uncontrolled DM2 Hgb A1c 12.8%d  Desn't use insulin outpatient because he thinks it causes his bowel obstructions. Takes metformin at home, held while NPO. While inpatient increased lantus to 10 units and continue correction doses but patient frequently refused. Was planning to resume PTA metformn and have further discussion with patient about insulin, but left without being discharged formally overnight 12/29.      HIV: Continue PTA biktarvy as able. Viral load undetectable in October 2022 and patient maintains adherence with treatment. Called patient to remind him to please follow up with ID physician to contnue this, he did not answer the telephone.      Recent COVID-19 infection, resolved  S/p 5 days Paxlovid 12/17-12/21.  No ongoing symptoms including no respiratory symptoms, no fever. Was awaiting clearance from shelter to return, required 20 days.     Consultations This Hospital Stay   NURSING TO CONSULT FOR VASCULAR ACCESS CARE IP CONSULT  NURSING TO CONSULT FOR VASCULAR ACCESS CARE IP CONSULT  SURGERY GENERAL ADULT IP CONSULT  CARE MANAGEMENT / SOCIAL WORK IP CONSULT    Code Status   Prior    Time Spent on this Encounter   I, Dawn Haq MD, discharged this patient today but I did not personally see the patient today and will not be billing for the patient's discharge.       Dawn Haq MD  [facility] [address] [address]  Phone: [phone]  ______________________________________________________________________    Physical Exam  "  Vital Signs:                    Weight: 140 lbs 0 oz  Patient absconded overnight, did not see patient prior to discharge, Please see most recent exam done by me on 12/28 on 12/28 progress note.        Primary Care Physician   Chevy Reynoso    Discharge Orders   No discharge procedures on file.    Significant Results and Procedures   Most Recent 3 CBC's:Recent Labs   Lab Test 12/28/22  1431 12/27/22  0648 12/26/22  1010   WBC 6.7 6.8 6.0   HGB 12.1* 11.5* 12.3*   MCV 89 89 90    338 370     Most Recent 3 BMP's:Recent Labs   Lab Test 12/28/22  1701 12/28/22  1431 12/28/22  1225 12/27/22  0752 12/27/22  0648 12/26/22  1125 12/26/22  1010   NA  --  137  --   --  134*  --  137   POTASSIUM  --  3.7  --   --  3.7  --  3.7   CHLORIDE  --  103  --   --  100  --  103   CO2  --  23  --   --  22  --  24   BUN  --  7.6  --   --  10.0  --  10.7   CR  --  0.73  --   --  0.61*  --  0.67   ANIONGAP  --  11  --   --  12  --  10   LINDA  --  9.0  --   --  8.5*  --  8.9   * 290* 247*   < > 366*   < > 272*    < > = values in this interval not displayed.     Most Recent 2 LFT's:Recent Labs   Lab Test 12/28/22  1431 12/27/22  0648   AST 18 17   ALT 15 11   ALKPHOS 94 124   BILITOTAL 0.4 0.3     Most Recent 3 INR's:Recent Labs   Lab Test 12/23/22  1940 12/17/22  0812 12/08/22  0624   INR 0.95 0.97 1.07       Discharge Medications   Discharge Medication List as of 12/29/2022  5:00 AM      CONTINUE these medications which have NOT CHANGED    Details   albuterol (PROAIR HFA/PROVENTIL HFA/VENTOLIN HFA) 108 (90 Base) MCG/ACT inhaler Inhale 2 puffs into the lungs every 6 hours as needed for shortness of breath, wheezing or cough, Disp-18 g, R-0, Local Print      Alcohol Swabs PADS 1 swab daily, Disp-100 each, R-0, E-Prescribe      bictegravir-emtricitabine-tenofovir (BIKTARVY) -25 MG per tablet Take 1 tablet by mouth daily, Disp-30 tablet, R-0, E-Prescribe      Gauze Pads & Dressings (GAUZE PADS 4\"X4\") 4\"X4\" PADS 2 each " "daily, Disp-30 each, R-0, E-Prescribe      insulin detemir (LEVEMIR PEN) 100 UNIT/ML pen Inject 17 Units Subcutaneous every 24 hours, Disp-15 mL, R-1, E-Prescribe      insulin pen needle (32G X 4 MM) 32G X 4 MM miscellaneous Use pen needles three times daily before meals and also before bed.Disp-200 each, [id number]      metFORMIN (GLUCOPHAGE XR) 500 MG 24 hr tablet Take 3 tablets (1,500 mg) by mouth daily (with dinner) With food, Disp-90 tablet, R-0, E-Prescribe      mupirocin (BACTROBAN) 2 % external ointment Apply topically 3 times dailyDisp-15 g, [id number]      pantoprazole (PROTONIX) 20 MG EC tablet Take 1 tablet (20 mg) by mouth 2 times daily, Disp-60 tablet, R-0, E-Prescribe      sterile water, bottle, irrigation Irrigate with 1,000 mLs as directed daily, Disp-1000 mL, R-0, E-Prescribe           Allergies   Allergies   Allergen Reactions     Fentanyl Blisters     Per pt, after taking this medication had blisters develope     Tylenol [Acetaminophen] Itching     Dulaglutide Rash     Insulin Lispro Rash     Patient reported     Penicillin V Other (See Comments) and Rash     Diffuse maculopapular rash + feels \"high\", per pt.      "

## 2023-01-24 ENCOUNTER — APPOINTMENT (OUTPATIENT)
Dept: GENERAL RADIOLOGY | Facility: CLINIC | Age: 58
End: 2023-01-24
Attending: EMERGENCY MEDICINE
Payer: MEDICAID

## 2023-01-24 ENCOUNTER — HOSPITAL ENCOUNTER (EMERGENCY)
Facility: CLINIC | Age: 58
Discharge: HOME | End: 2023-01-25
Attending: EMERGENCY MEDICINE
Payer: MEDICAID

## 2023-01-24 ENCOUNTER — APPOINTMENT (OUTPATIENT)
Dept: CT IMAGING | Facility: CLINIC | Age: 58
End: 2023-01-24
Attending: EMERGENCY MEDICINE
Payer: MEDICAID

## 2023-01-24 VITALS
HEART RATE: 111 BPM | RESPIRATION RATE: 16 BRPM | WEIGHT: 140 LBS | DIASTOLIC BLOOD PRESSURE: 79 MMHG | SYSTOLIC BLOOD PRESSURE: 125 MMHG | BODY MASS INDEX: 25.76 KG/M2 | OXYGEN SATURATION: 99 % | HEIGHT: 62 IN | TEMPERATURE: 98.2 F

## 2023-01-24 DIAGNOSIS — R10.84 ABDOMINAL PAIN, GENERALIZED: ICD-10-CM

## 2023-01-24 LAB
ALBUMIN SERPL BCG-MCNC: 4.2 G/DL (ref 3.5–5.2)
ALP SERPL-CCNC: 140 U/L (ref 40–129)
ALT SERPL W P-5'-P-CCNC: 19 U/L (ref 10–50)
ANION GAP SERPL CALCULATED.3IONS-SCNC: 17 MMOL/L (ref 7–15)
AST SERPL W P-5'-P-CCNC: 24 U/L (ref 10–50)
BASOPHILS # BLD AUTO: 0.1 10E3/UL (ref 0–0.2)
BASOPHILS NFR BLD AUTO: 0 %
BILIRUB SERPL-MCNC: 0.8 MG/DL
BUN SERPL-MCNC: 26.8 MG/DL (ref 6–20)
CALCIUM SERPL-MCNC: 9.2 MG/DL (ref 8.6–10)
CHLORIDE SERPL-SCNC: 96 MMOL/L (ref 98–107)
CREAT SERPL-MCNC: 0.66 MG/DL (ref 0.67–1.17)
DEPRECATED HCO3 PLAS-SCNC: 22 MMOL/L (ref 22–29)
EOSINOPHIL # BLD AUTO: 0.1 10E3/UL (ref 0–0.7)
EOSINOPHIL NFR BLD AUTO: 1 %
ERYTHROCYTE [DISTWIDTH] IN BLOOD BY AUTOMATED COUNT: 12.2 % (ref 10–15)
GFR SERPL CREATININE-BSD FRML MDRD: >90 ML/MIN/1.73M2
GLUCOSE SERPL-MCNC: 424 MG/DL (ref 70–99)
HCT VFR BLD AUTO: 43.8 % (ref 40–53)
HGB BLD-MCNC: 14.6 G/DL (ref 13.3–17.7)
IMM GRANULOCYTES # BLD: 0.1 10E3/UL
IMM GRANULOCYTES NFR BLD: 1 %
LACTATE SERPL-SCNC: 1.8 MMOL/L (ref 0.7–2)
LIPASE SERPL-CCNC: 44 U/L (ref 13–60)
LYMPHOCYTES # BLD AUTO: 1.6 10E3/UL (ref 0.8–5.3)
LYMPHOCYTES NFR BLD AUTO: 11 %
MCH RBC QN AUTO: 29.7 PG (ref 26.5–33)
MCHC RBC AUTO-ENTMCNC: 33.3 G/DL (ref 31.5–36.5)
MCV RBC AUTO: 89 FL (ref 78–100)
MONOCYTES # BLD AUTO: 0.8 10E3/UL (ref 0–1.3)
MONOCYTES NFR BLD AUTO: 5 %
NEUTROPHILS # BLD AUTO: 12.3 10E3/UL (ref 1.6–8.3)
NEUTROPHILS NFR BLD AUTO: 82 %
NRBC # BLD AUTO: 0 10E3/UL
NRBC BLD AUTO-RTO: 0 /100
PLATELET # BLD AUTO: 479 10E3/UL (ref 150–450)
POTASSIUM SERPL-SCNC: 4.1 MMOL/L (ref 3.4–5.3)
PROT SERPL-MCNC: 7.6 G/DL (ref 6.4–8.3)
RBC # BLD AUTO: 4.91 10E6/UL (ref 4.4–5.9)
SODIUM SERPL-SCNC: 135 MMOL/L (ref 136–145)
WBC # BLD AUTO: 14.8 10E3/UL (ref 4–11)

## 2023-01-24 PROCEDURE — 36415 COLL VENOUS BLD VENIPUNCTURE: CPT | Performed by: EMERGENCY MEDICINE

## 2023-01-24 PROCEDURE — 85004 AUTOMATED DIFF WBC COUNT: CPT | Performed by: EMERGENCY MEDICINE

## 2023-01-24 PROCEDURE — 96361 HYDRATE IV INFUSION ADD-ON: CPT | Performed by: EMERGENCY MEDICINE

## 2023-01-24 PROCEDURE — 250N000011 HC RX IP 250 OP 636: Performed by: EMERGENCY MEDICINE

## 2023-01-24 PROCEDURE — 99285 EMERGENCY DEPT VISIT HI MDM: CPT | Mod: 25 | Performed by: EMERGENCY MEDICINE

## 2023-01-24 PROCEDURE — 80053 COMPREHEN METABOLIC PANEL: CPT | Performed by: EMERGENCY MEDICINE

## 2023-01-24 PROCEDURE — 96374 THER/PROPH/DIAG INJ IV PUSH: CPT | Mod: 59 | Performed by: EMERGENCY MEDICINE

## 2023-01-24 PROCEDURE — 81001 URINALYSIS AUTO W/SCOPE: CPT | Performed by: EMERGENCY MEDICINE

## 2023-01-24 PROCEDURE — 74177 CT ABD & PELVIS W/CONTRAST: CPT

## 2023-01-24 PROCEDURE — 99284 EMERGENCY DEPT VISIT MOD MDM: CPT | Performed by: EMERGENCY MEDICINE

## 2023-01-24 PROCEDURE — 74177 CT ABD & PELVIS W/CONTRAST: CPT | Mod: 26 | Performed by: RADIOLOGY

## 2023-01-24 PROCEDURE — 83690 ASSAY OF LIPASE: CPT | Performed by: EMERGENCY MEDICINE

## 2023-01-24 PROCEDURE — 96375 TX/PRO/DX INJ NEW DRUG ADDON: CPT | Performed by: EMERGENCY MEDICINE

## 2023-01-24 PROCEDURE — 74019 RADEX ABDOMEN 2 VIEWS: CPT | Mod: 26 | Performed by: RADIOLOGY

## 2023-01-24 PROCEDURE — 258N000003 HC RX IP 258 OP 636: Performed by: EMERGENCY MEDICINE

## 2023-01-24 PROCEDURE — 83605 ASSAY OF LACTIC ACID: CPT | Performed by: EMERGENCY MEDICINE

## 2023-01-24 PROCEDURE — 74019 RADEX ABDOMEN 2 VIEWS: CPT

## 2023-01-24 RX ORDER — IOPAMIDOL 755 MG/ML
86 INJECTION, SOLUTION INTRAVASCULAR ONCE
Status: COMPLETED | OUTPATIENT
Start: 2023-01-24 | End: 2023-01-24

## 2023-01-24 RX ORDER — KETOROLAC TROMETHAMINE 15 MG/ML
15 INJECTION, SOLUTION INTRAMUSCULAR; INTRAVENOUS ONCE
Status: COMPLETED | OUTPATIENT
Start: 2023-01-24 | End: 2023-01-24

## 2023-01-24 RX ORDER — ONDANSETRON 2 MG/ML
4 INJECTION INTRAMUSCULAR; INTRAVENOUS EVERY 30 MIN PRN
Status: DISCONTINUED | OUTPATIENT
Start: 2023-01-24 | End: 2023-01-25 | Stop reason: HOSPADM

## 2023-01-24 RX ORDER — SODIUM CHLORIDE 9 MG/ML
INJECTION, SOLUTION INTRAVENOUS CONTINUOUS
Status: DISCONTINUED | OUTPATIENT
Start: 2023-01-24 | End: 2023-01-25 | Stop reason: HOSPADM

## 2023-01-24 RX ORDER — ACETAMINOPHEN 325 MG/1
975 TABLET ORAL ONCE
Status: COMPLETED | OUTPATIENT
Start: 2023-01-24 | End: 2023-01-24

## 2023-01-24 RX ADMIN — IOPAMIDOL 86 ML: 755 INJECTION, SOLUTION INTRAVENOUS at 19:55

## 2023-01-24 RX ADMIN — SODIUM CHLORIDE 1000 ML: 9 INJECTION, SOLUTION INTRAVENOUS at 23:08

## 2023-01-24 RX ADMIN — KETOROLAC TROMETHAMINE 15 MG: 15 INJECTION, SOLUTION INTRAMUSCULAR; INTRAVENOUS at 23:07

## 2023-01-24 RX ADMIN — FAMOTIDINE 20 MG: 10 INJECTION, SOLUTION INTRAVENOUS at 23:07

## 2023-01-24 ASSESSMENT — ACTIVITIES OF DAILY LIVING (ADL)
ADLS_ACUITY_SCORE: 35
ADLS_ACUITY_SCORE: 37
ADLS_ACUITY_SCORE: 37
ADLS_ACUITY_SCORE: 35

## 2023-01-24 NOTE — ED TRIAGE NOTES
"Triage Assessment & Note:    /79   Pulse 111   Temp 98.2  F (36.8  C) (Temporal)   Resp 16   Ht 1.575 m (5' 2\")   Wt 63.5 kg (140 lb)   SpO2 99%   BMI 25.61 kg/m      Patient presents with: C/O abd pain and elevated blood glucose    Home Treatments/Remedies: 300 ml NS from EMS    Febrile / Afebrile? Afebrile         Mick Barrientos RN  January 24, 2023         Triage Assessment     Row Name 01/24/23 1618       Triage Assessment (Adult)    Airway WDL WDL       Respiratory WDL    Respiratory WDL WDL       Cardiac WDL    Cardiac WDL WDL              "

## 2023-01-24 NOTE — ED PROVIDER NOTES
[facility] ([facility])  January 24, 2023      History     Chief Complaint   Patient presents with     Abdominal Pain     HPI  Andrew Lockwood is a 55 year old male, well-known to the ED, with PMH notable for HIV, prior SBOs and adynamic ileus, DM2, HLD, GERD, chronic abdominal pain, prior toxoplasmosis, history of substance abuse, PTSD, recent COVID-19 diagnosis (for which he was prescribed Paxlovid), presenting today for chief complaint of abdominal pain.  Patient states that he began having abdominal pain 1 month ago, left upper quadrant and left lower quadrant.  It does not radiate.  It feels bloated and sharp.  He states that the pain has been worsening in severity.  It feels exactly like his previous bowel obstructions.  He has not passed any flatus today.  He did yesterday as well as had a bowel movement yesterday.  He was up vomiting all night.  He does not know if there was any blood in it.  He has not taken any pain medication at home.  He denies any fever, chills, chest pain, shortness of breath, back pain, diarrhea, urinary symptoms.      Past Medical History  Past Medical History:   Diagnosis Date     AIDS (H)      Allergic rhinitis due to other allergen     DNS     Anal dysplasia      Chronic abdominal pain      CNS toxoplasmosis (H)      Diabetes type 2, controlled (H)      GERD (gastroesophageal reflux disease)      History of seizure      History of substance abuse (H)      HIV (human immunodeficiency virus infection) (H)      HLD (hyperlipidemia)      Lung nodules      Periungual wart      PTSD (post-traumatic stress disorder)      Sleep apnea     doesn't use CPAP     Past Surgical History:   Procedure Laterality Date     ANOSCOPY N/A 9/9/2020    Procedure: Exam Under Anesthesia, ANOSCOPY, fulgeration of rectal fissures with Rectal Biopsies;  Surgeon: Thanh Lundberg MD;  Location: [facility] OR     COLONOSCOPY Left 1/22/2016    Procedure: COMBINED COLONOSCOPY, SINGLE OR  "MULTIPLE BIOPSY/POLYPECTOMY BY BIOPSY;  Surgeon: Clark Saini MD;  Location: [facility] GI     ESOPHAGOSCOPY, GASTROSCOPY, DUODENOSCOPY (EGD), COMBINED N/A 6/6/2022    Procedure: ESOPHAGOGASTRODUODENOSCOPY, WITH BIOPSY;  Surgeon: Kecia Benítez MD;  Location: [facility] GI     HC EXPLORE UNDESC TESTIS,INGUIN/SCROTAL       LAPAROSCOPIC APPENDECTOMY N/A 1/31/2018    Procedure: LAPAROSCOPIC APPENDECTOMY;  LAPAROSCOPIC APPENDECTOMY;  Surgeon: Dawn Holt MD;  Location: [facility] OR     LAPAROSCOPY DIAGNOSTIC (GENERAL) N/A 7/26/2016    Procedure: LAPAROSCOPY DIAGNOSTIC (GENERAL);  Surgeon: Susannah Arriaga MD;  Location: [facility] OR     LAPAROSCOPY DIAGNOSTIC (GENERAL) N/A 4/16/2018    Procedure: LAPAROSCOPY DIAGNOSTIC (GENERAL);  Diagnostic laparoscopy and lysis of adhesions;  Surgeon: Prince Dowling MD;  Location: [facility] OR     OPTICAL TRACKING SYSTEM CRANIOTOMY, EXCISE TUMOR, COMBINED Left 4/10/2015    Procedure: COMBINED OPTICAL TRACKING SYSTEM CRANIOTOMY, EXCISE TUMOR;  Surgeon: Mirlande Colmenares MD;  Location: [facility] OR     REPAIR GAMEKEEPER'S THUMB Right 12/2/2016    Procedure: REPAIR LIGAMENT ULNAR COLLATERAL THUMB (GAMEKEEPER'S);  Surgeon: Evin Zamorano MD;  Location: [facility] OR     ZZC NONSPECIFIC PROCEDURE      right forearm fracture     albuterol (PROAIR HFA/PROVENTIL HFA/VENTOLIN HFA) 108 (90 Base) MCG/ACT inhaler  Alcohol Swabs PADS  bictegravir-emtricitabine-tenofovir (BIKTARVY) -25 MG per tablet  Gauze Pads & Dressings (GAUZE PADS 4\"X4\") 4\"X4\" PADS  insulin detemir (LEVEMIR PEN) 100 UNIT/ML pen  insulin pen needle (32G X 4 MM) 32G X 4 MM miscellaneous  metFORMIN (GLUCOPHAGE XR) 500 MG 24 hr tablet  mupirocin (BACTROBAN) 2 % external ointment  pantoprazole (PROTONIX) 20 MG EC tablet  sterile water, bottle, irrigation      Allergies   Allergen Reactions     Fentanyl Blisters     Per pt, after taking this medication had blisters develope     Tylenol [Acetaminophen] Itching     Dulaglutide Rash " "    Insulin Lispro Rash     Patient reported     Penicillin V Other (See Comments) and Rash     Diffuse maculopapular rash + feels \"high\", per pt.      Family History  Family History   Problem Relation Age of Onset     Diabetes Brother      Diabetes Father      Alzheimer Disease Father      Unknown/Adopted Mother      Diabetes Paternal Grandfather      Cancer No family hx of         no skin cancer     Skin Cancer No family hx of         no famiy hx of skin cancer     Glaucoma No family hx of      Macular Degeneration No family hx of      Social History   Social History     Tobacco Use     Smoking status: Some Days     Packs/day: 0.25     Years: 40.00     Pack years: 10.00     Types: Cigarettes     Smokeless tobacco: Former   Substance Use Topics     Alcohol use: No     Alcohol/week: 0.0 standard drinks     Comment: Last etoh in 2007     Drug use: Not Currently     Types: Marijuana, Methamphetamines         A medically appropriate review of systems was performed with pertinent positives and negatives noted in the HPI, and all other systems negative.    Physical Exam   BP: 125/79  Pulse: 111  Temp: 98.2  F (36.8  C)  Resp: 16  Height: 157.5 cm (5' 2\")  Weight: 63.5 kg (140 lb)  SpO2: 99 %  Physical Exam  General: no acute distress.  HENT: Normocephalic and atraumatic.    Eyes: EOMI, conjunctivae normal.   Cardiovascular:  Normal rate and regular rhythm.   No murmur heard.  Pulmonary:  No respiratory distress. Normal breath sounds.   Abdominal: Minimally distension.  Abdomen is soft. There is no mass.  Highly tender to left upper quadrant and left lower quadrant.  Decreased bowel sounds.  Musculoskeletal:    No swelling or tenderness.  Moving all extremities spontaneously.    Skin: warm and dry  Neurological:  No focal deficit present.    Psychiatric: Agitated with questioning        ED Course, Procedures, & Data      Procedures                  Results for orders placed or performed during the hospital encounter of " 01/24/23   XR Abdomen 2 Views     Status: None    Narrative    Exam: XR ABDOMEN 2 VIEWS, 1/24/2023 5:47 PM    Indication: history of SBO, left abdominal pain today similar    Comparison: X-ray 12/27/2022, 12/25/2022    Findings:   Supine and upright radiographs of the abdomen. Nonobstructive bowel  gas pattern. No pneumatosis. No portal venous gas. No aggressive  osseous lesions. Lung bases are grossly unremarkable.      Impression    Impression:   Nonobstructive bowel gas pattern.    I have personally reviewed the examination and initial interpretation  and I agree with the findings.    APRIL ELIZONDO MD         SYSTEM ID:  [id number]   Lipase     Status: Normal   Result Value Ref Range    Lipase 44 13 - 60 U/L   Comprehensive metabolic panel     Status: Abnormal   Result Value Ref Range    Sodium 135 (L) 136 - 145 mmol/L    Potassium 4.1 3.4 - 5.3 mmol/L    Chloride 96 (L) 98 - 107 mmol/L    Carbon Dioxide (CO2) 22 22 - 29 mmol/L    Anion Gap 17 (H) 7 - 15 mmol/L    Urea Nitrogen 26.8 (H) 6.0 - 20.0 mg/dL    Creatinine 0.66 (L) 0.67 - 1.17 mg/dL    Calcium 9.2 8.6 - 10.0 mg/dL    Glucose 424 (H) 70 - 99 mg/dL    Alkaline Phosphatase 140 (H) 40 - 129 U/L    AST 24 10 - 50 U/L    ALT 19 10 - 50 U/L    Protein Total 7.6 6.4 - 8.3 g/dL    Albumin 4.2 3.5 - 5.2 g/dL    Bilirubin Total 0.8 <=1.2 mg/dL    GFR Estimate >90 >60 mL/min/1.73m2   Lactic acid whole blood     Status: Normal   Result Value Ref Range    Lactic Acid 1.8 0.7 - 2.0 mmol/L   CBC with platelets and differential     Status: Abnormal   Result Value Ref Range    WBC Count 14.8 (H) 4.0 - 11.0 10e3/uL    RBC Count 4.91 4.40 - 5.90 10e6/uL    Hemoglobin 14.6 13.3 - 17.7 g/dL    Hematocrit 43.8 40.0 - 53.0 %    MCV 89 78 - 100 fL    MCH 29.7 26.5 - 33.0 pg    MCHC 33.3 31.5 - 36.5 g/dL    RDW 12.2 10.0 - 15.0 %    Platelet Count 479 (H) 150 - 450 10e3/uL    % Neutrophils 82 %    % Lymphocytes 11 %    % Monocytes 5 %    % Eosinophils 1 %    % Basophils 0 %     % Immature Granulocytes 1 %    NRBCs per 100 WBC 0 <1 /100    Absolute Neutrophils 12.3 (H) 1.6 - 8.3 10e3/uL    Absolute Lymphocytes 1.6 0.8 - 5.3 10e3/uL    Absolute Monocytes 0.8 0.0 - 1.3 10e3/uL    Absolute Eosinophils 0.1 0.0 - 0.7 10e3/uL    Absolute Basophils 0.1 0.0 - 0.2 10e3/uL    Absolute Immature Granulocytes 0.1 <=0.4 10e3/uL    Absolute NRBCs 0.0 10e3/uL   CBC with platelets differential     Status: Abnormal    Narrative    The following orders were created for panel order CBC with platelets differential.  Procedure                               Abnormality         Status                     ---------                               -----------         ------                     CBC with platelets and d...[[id number]]  Abnormal            Final result                 Please view results for these tests on the individual orders.     Medications   0.9% sodium chloride BOLUS (has no administration in time range)     Followed by   sodium chloride 0.9% infusion (has no administration in time range)   ondansetron (ZOFRAN) injection 4 mg (has no administration in time range)   acetaminophen (TYLENOL) tablet 975 mg (has no administration in time range)   ketorolac (TORADOL) injection 15 mg (has no administration in time range)   famotidine (PEPCID) injection 20 mg (has no administration in time range)   iopamidol (ISOVUE-370) solution 86 mL (86 mLs Intravenous Given 1/24/23 1955)   sodium chloride (PF) 0.9% PF flush 72 mL (72 mLs Intravenous Given 1/24/23 1955)     Labs Ordered and Resulted from Time of ED Arrival to Time of ED Departure   COMPREHENSIVE METABOLIC PANEL - Abnormal       Result Value    Sodium 135 (*)     Potassium 4.1      Chloride 96 (*)     Carbon Dioxide (CO2) 22      Anion Gap 17 (*)     Urea Nitrogen 26.8 (*)     Creatinine 0.66 (*)     Calcium 9.2      Glucose 424 (*)     Alkaline Phosphatase 140 (*)     AST 24      ALT 19      Protein Total 7.6      Albumin 4.2      Bilirubin Total 0.8       GFR Estimate >90     CBC WITH PLATELETS AND DIFFERENTIAL - Abnormal    WBC Count 14.8 (*)     RBC Count 4.91      Hemoglobin 14.6      Hematocrit 43.8      MCV 89      MCH 29.7      MCHC 33.3      RDW 12.2      Platelet Count 479 (*)     % Neutrophils 82      % Lymphocytes 11      % Monocytes 5      % Eosinophils 1      % Basophils 0      % Immature Granulocytes 1      NRBCs per 100 WBC 0      Absolute Neutrophils 12.3 (*)     Absolute Lymphocytes 1.6      Absolute Monocytes 0.8      Absolute Eosinophils 0.1      Absolute Basophils 0.1      Absolute Immature Granulocytes 0.1      Absolute NRBCs 0.0     LIPASE - Normal    Lipase 44     LACTIC ACID WHOLE BLOOD - Normal    Lactic Acid 1.8     ROUTINE UA WITH MICROSCOPIC REFLEX TO CULTURE     XR Abdomen 2 Views   Final Result   Impression:    Nonobstructive bowel gas pattern.      I have personally reviewed the examination and initial interpretation   and I agree with the findings.      APRIL ELIZONDO MD            SYSTEM ID:  [id number]      CT Abdomen Pelvis w Contrast    (Results Pending)          Medical Decision Making  The patient's presentation is strongly suggestive of a chronic illness mild to moderate exacerbation, progression, or side effect of treatment.    The patient's evaluation involved:  review of external note(s) from 3+ sources (see separate area of note for details)  ordering and/or review of 3+ test(s) in this encounter (see separate area of note for details)  review of 3+ test result(s) ordered prior to this encounter (see separate area of note for details)    The patient's management involved a decision regarding hospitalization.      Assessment & Plan    Patient arrives to the emergency department for left-sided abdominal pain with associated nausea and vomiting.  Patient is not very forthcoming, and seems very annoyed on exam with my questioning and physical.  He is highly tender to the left upper left lower quadrant, decreased bowel  sounds, mildly distended.    Differential diagnosis includes but is not limited to DKA, pancreatitis, peptic ulcer, partial small bowel obstruction, kidney stone.  He notes that this pain is exactly like his previous obstructions, slight low suspicion for AAA/dissection.    I ordered normal saline bolus, infusion, IV Zofran and Pepcid.  Tylenol and Toradol for pain.  CMP with chronic hyponatremia.  He does have a mild anion gap 17.  Lipase normal.  Leukocytosis of 14.8.  X-ray does not reveal an obstructive pattern.  With his severe pain, leukocytosis and x-ray not consistent with obstruction, will obtain CT abdomen to evaluate for infectious etiology.  UA to evaluate for ketones or infection.      At the time of signout, patient is pending IV fluids and medication, CT of the abdomen.  If CT is negative for infectious etiology, patient should have p.o. trial after hydration and antiemetics.       I have reviewed the nursing notes. I have reviewed the findings, diagnosis, plan and need for follow up with the patient.    New Prescriptions    No medications on file       Final diagnoses:   None       Daniela Mcadams DO  [facility] EMERGENCY DEPARTMENT  1/24/2023     Daniela Mcadams MD  01/24/23 2031

## 2023-01-25 ENCOUNTER — OFFICE VISIT (OUTPATIENT)
Dept: INFECTIOUS DISEASES | Facility: CLINIC | Age: 58
End: 2023-01-25
Attending: STUDENT IN AN ORGANIZED HEALTH CARE EDUCATION/TRAINING PROGRAM
Payer: MEDICAID

## 2023-01-25 ENCOUNTER — OFFICE VISIT (OUTPATIENT)
Dept: PHARMACY | Facility: CLINIC | Age: 58
End: 2023-01-25
Payer: MEDICAID

## 2023-01-25 VITALS
TEMPERATURE: 98.4 F | SYSTOLIC BLOOD PRESSURE: 116 MMHG | BODY MASS INDEX: 25.61 KG/M2 | DIASTOLIC BLOOD PRESSURE: 78 MMHG | WEIGHT: 140 LBS | HEART RATE: 97 BPM | OXYGEN SATURATION: 97 %

## 2023-01-25 DIAGNOSIS — B20 HUMAN IMMUNODEFICIENCY VIRUS (HIV) DISEASE (H): Primary | ICD-10-CM

## 2023-01-25 DIAGNOSIS — Z79.4 TYPE 2 DIABETES MELLITUS WITH HYPERGLYCEMIA, WITH LONG-TERM CURRENT USE OF INSULIN (H): ICD-10-CM

## 2023-01-25 DIAGNOSIS — Z79.4 TYPE 2 DIABETES MELLITUS WITH HYPERGLYCEMIA, WITH LONG-TERM CURRENT USE OF INSULIN (H): Primary | ICD-10-CM

## 2023-01-25 DIAGNOSIS — Z21 ASYMPTOMATIC HUMAN IMMUNODEFICIENCY VIRUS (HIV) INFECTION STATUS (H): ICD-10-CM

## 2023-01-25 DIAGNOSIS — K21.9 GASTROESOPHAGEAL REFLUX DISEASE, UNSPECIFIED WHETHER ESOPHAGITIS PRESENT: ICD-10-CM

## 2023-01-25 DIAGNOSIS — E11.65 TYPE 2 DIABETES MELLITUS WITH HYPERGLYCEMIA, WITH LONG-TERM CURRENT USE OF INSULIN (H): ICD-10-CM

## 2023-01-25 DIAGNOSIS — E78.5 HYPERLIPIDEMIA LDL GOAL <100: ICD-10-CM

## 2023-01-25 DIAGNOSIS — E11.65 TYPE 2 DIABETES MELLITUS WITH HYPERGLYCEMIA, WITH LONG-TERM CURRENT USE OF INSULIN (H): Primary | ICD-10-CM

## 2023-01-25 LAB
ALBUMIN UR-MCNC: 20 MG/DL
APPEARANCE UR: CLEAR
BILIRUB UR QL STRIP: NEGATIVE
COLOR UR AUTO: ABNORMAL
GLUCOSE UR STRIP-MCNC: >=1000 MG/DL
HGB UR QL STRIP: NEGATIVE
KETONES UR STRIP-MCNC: NEGATIVE MG/DL
LEUKOCYTE ESTERASE UR QL STRIP: NEGATIVE
MUCOUS THREADS #/AREA URNS LPF: PRESENT /LPF
NITRATE UR QL: NEGATIVE
PH UR STRIP: 6 [PH] (ref 5–7)
RBC URINE: 1 /HPF
SP GR UR STRIP: 1.01 (ref 1–1.03)
SQUAMOUS EPITHELIAL: <1 /HPF
UROBILINOGEN UR STRIP-MCNC: NORMAL MG/DL
WBC URINE: 2 /HPF
YEAST #/AREA URNS HPF: ABNORMAL /HPF

## 2023-01-25 PROCEDURE — 99607 MTMS BY PHARM ADDL 15 MIN: CPT | Performed by: PHARMACIST

## 2023-01-25 PROCEDURE — 96361 HYDRATE IV INFUSION ADD-ON: CPT | Performed by: EMERGENCY MEDICINE

## 2023-01-25 PROCEDURE — 99605 MTMS BY PHARM NP 15 MIN: CPT | Performed by: PHARMACIST

## 2023-01-25 RX ORDER — BICTEGRAVIR SODIUM, EMTRICITABINE, AND TENOFOVIR ALAFENAMIDE FUMARATE 50; 200; 25 MG/1; MG/1; MG/1
1 TABLET ORAL DAILY
Qty: 30 TABLET | Refills: 2 | Status: ON HOLD | OUTPATIENT
Start: 2023-01-25 | End: 2023-08-16

## 2023-01-25 RX ORDER — FAMOTIDINE 40 MG/1
40 TABLET, FILM COATED ORAL DAILY
Qty: 30 TABLET | Refills: 1 | Status: ON HOLD | OUTPATIENT
Start: 2023-01-25 | End: 2023-02-20

## 2023-01-25 RX ORDER — METFORMIN HCL 500 MG
1500 TABLET, EXTENDED RELEASE 24 HR ORAL
Qty: 90 TABLET | Refills: 3 | Status: ON HOLD | OUTPATIENT
Start: 2023-01-25 | End: 2023-02-20

## 2023-01-25 ASSESSMENT — PAIN SCALES - GENERAL: PAINLEVEL: NO PAIN (0)

## 2023-01-25 NOTE — PATIENT INSTRUCTIONS
"Recommendations from today's MTM visit:                                                    MTM (medication therapy management) is a service provided by a clinical pharmacist designed to help you get the most of out of your medicines.   Today we reviewed what your medicines are for, how to know if they are working, that your medicines are safe and how to make your medicine regimen as easy as possible.      1. Re-start metformin  mg 3 tablets once daily   2. Will see if we can get patient a new glucometer to help with monitoring blood sugar    Follow-up: 1 week, consider adding SGLT2-I    It was great speaking with you today.  I value your experience and would be very thankful for your time in providing feedback in our clinic survey. In the next few days, you may receive an email or text message from [employer] with a link to a survey related to your  clinical pharmacist.\"     To schedule another MTM appointment, please call the clinic directly or you may call the MTM scheduling line at [phone] or toll-free at [phone].     My Clinical Pharmacist's contact information:                                                      Please feel free to contact me with any questions or concerns you have.      Dorie Holland, PharmD, AAHIVP  Medication Therapy Management Pharmacist       "

## 2023-01-25 NOTE — NURSING NOTE
Chief Complaint   Patient presents with     RECHECK       /78   Pulse 97   Temp 98.4  F (36.9  C) (Oral)   Wt 63.5 kg (140 lb)   SpO2 97%   BMI 25.61 kg/m      Kuldip Brian on 1/25/2023 at 2:01 PM

## 2023-01-25 NOTE — PROGRESS NOTES
Medication Therapy Management (MTM) Encounter    ASSESSMENT:                            Medication Adherence/Access: have tried pill boxes in the past but patient was not reliant to pick them up. Will trial having patient keep appointments for now and can re-assess at a future visit.    Type 2 Diabetes: Right now, recommend A1c goal <9%. Lactic acid normal. Since it's unclear if patient has actually been taking metformin, recommend refilling medications today and re-starting metformin ER 1500 mg once daily.  Frequent ED visits and blood sugar in 300-400s at past few ED visits, recommend escalating therapy as able. Could consider an SGLT2-I due to concerns storing insulin and GLP-1 RA right now. Hesitant to start GLP-1 RA with frequent GI complaints as well. Renal function okay and no history of UTIs per patient.    HIV: Last HIV viral load at goal <20. Encouraged adherence and ID provider refilled today.    Hyperlipidemia: recommend starting a statin at future visit if patient is willing to take it. Will discuss next visit.    Health maintenance: due for flu and covid vaccines along with hepA series, PPSV23 booster, and Shingrix. Can discuss more next visit.     PLAN:                            1. Re-start metformin  mg 3 tablets once daily   2. Will see if we can get patient a new glucometer to help with monitoring blood sugar    Follow-up: 1 week, consider adding SGLT2-I    SUBJECTIVE/OBJECTIVE:                          Andrew Lockwood is a 55 year old male coming in for a follow-up visit.  Today's visit is a follow-up MTM visit from 8/31/22.     Reason for visit: adherence check-in    Allergies/ADRs: Reviewed in chart - itching to dulaglutide noted   Past Medical History: Reviewed in chart  Tobacco: He reports that he has been smoking cigarettes. He has a 10.00 pack-year smoking history. He has quit using smokeless tobacco.Nicotine/Tobacco Cessation Plan:   Information offered: Patient not interested at this  time  Alcohol: not currently using  Other Substance Use: smokes meth occasionally when he has money (usually at the beginning of the month)  Currently staying at [facility]     Medication Adherence/Access: Patient takes medications 1 time(s) per day.   Per patient, misses medication a few times per week. Unable to quantify exact amount based on patient report.  Medication barriers: storing medications at homeless shelter. Frequently loses medications/glucometer. Does not have a fridge to store insulin.   The patient fills medications at [place]: YES.    Type 2 Diabetes: Currently reports taking metformin  mg 3 tablets per day but recently ran out. Patient is not experiencing side effects. Nausea and GI symptoms at ED yesterday but he states this has resolved. Reports good adherence, but unclear per fill history and random blood sugars on recent renal panels.   Blood sugar monitoring: not currently monitoring. Lost glucometer and testing supplies. Also lost glucometer last August. See random blood sugar readings below.  Feel dizzy 4-5 times per week. Could be due to hypoglycemia   Eye exam: due  Foot exam: due  Diet/Exercise: sometimes gets sick from food available (ex: chinese). Eats food at the shelter. They serve food 3 times per day.   Aspirin: No  Statin: No - adherence concerns  ACEi/ARB: No. - adherence concerns        Urine Albumin:   Lab Results   Component Value Date    UMALCR 20.66 (H) 02/11/2020      Lab Results   Component Value Date    A1C 12.8 12/07/2022    A1C 12.8 11/17/2022    A1C 11.2 02/18/2022    A1C 11.5 01/22/2022    A1C 11.9 12/24/2021    A1C 9.7 11/11/2020    A1C 8.2 09/25/2020    A1C 8.2 08/27/2020    A1C 11.6 02/11/2020    A1C 12.3 01/07/2020     Component      Latest Ref Rng & Units 12/25/2022 12/26/2022 12/27/2022 12/28/2022                Glucose      70 - 99 mg/dL 417 (H) 272 (H) 366 (H) 290 (H)     Component      Latest Ref Rng & Units 1/24/2023             Glucose       70 - 99 mg/dL 424 (H)       Component      Latest Ref Rng & Units 5/25/2022 5/27/2022 6/5/2022 12/8/2022                Lactic Acid      0.7 - 2.0 mmol/L 0.7 1.2 1.5 1.0     Component      Latest Ref Rng & Units 12/23/2022 12/25/2022 1/24/2023               Lactic Acid      0.7 - 2.0 mmol/L 1.4 1.7 1.8       HIV: taking Biktarvy once daily for HIV. No concerns with this. Unclear adherence but viral load has been undetectable.   HIV VL: see below  CD4: 365 (19%) on 9/8/22  Diagnosis: 2015  Past regimens: Triumeq, Genvoya, Biktarvy since 2019  Mutations:    NRTI: none   NNRTI: V179D (pan-sensitive)    PI: E35D (pan-sensitive)   HepA antibody: nonreactive 5/26/22  HBsAb: nonreactive 4/13/15  HBcAb: nonreactive 10/13/22  HBsAg: nonreactive 4/13/15  HepC antibody: nonreactive 9/25/20   RPR: nonreactive 10/13/22  Immunizations: Covid 1/3, hepB 3/3 (finished 2017), MenACWY 2/2, PCV13, PPSV23, tdap.         Component      Latest Ref Rng & Units 8/24/2021 5/24/2022 10/13/2022   HIV-1 RNA Quant Result      Not Detected Copies/mL <20 (A) Not Detected Not Detected       Hyperlipidemia: Current therapy includes no current medications.    Recent Labs   Lab Test 10/13/22  1551 08/24/21  1622 08/18/16  1012 05/07/15  1035   CHOL 208* 171   < > 150   HDL 50 36*   < > 30*   *  --    < > Cannot estimate LDL when triglyceride exceeds 400 mg/dL   TRIG 259* 405*   < > 526*   CHOLHDLRATIO  --   --   --  5.0    < > = values in this interval not displayed.     Last Comprehensive Metabolic Panel:  Sodium   Date Value Ref Range Status   01/24/2023 135 (L) 136 - 145 mmol/L Final   06/24/2021 136 133 - 144 mmol/L Final     Potassium   Date Value Ref Range Status   01/24/2023 4.1 3.4 - 5.3 mmol/L Final     Comment:     Specimen slightly hemolyzed, potassium may be falsely elevated.   06/06/2022 3.6 3.4 - 5.3 mmol/L Final   12/12/2021 2.5 (LL) 3.4 - 5.3 mmol/L Final   06/24/2021 3.2 (L) 3.4 - 5.3 mmol/L Final     Chloride   Date Value Ref  Range Status   01/24/2023 96 (L) 98 - 107 mmol/L Final   06/06/2022 108 94 - 109 mmol/L Final   06/24/2021 107 94 - 109 mmol/L Final     Carbon Dioxide   Date Value Ref Range Status   06/24/2021 20 20 - 32 mmol/L Final     Carbon Dioxide (CO2)   Date Value Ref Range Status   01/24/2023 22 22 - 29 mmol/L Final   06/06/2022 24 20 - 32 mmol/L Final     Anion Gap   Date Value Ref Range Status   01/24/2023 17 (H) 7 - 15 mmol/L Final   06/06/2022 5 3 - 14 mmol/L Final   06/24/2021 9 3 - 14 mmol/L Final     Glucose   Date Value Ref Range Status   01/24/2023 424 (H) 70 - 99 mg/dL Final   06/06/2022 139 (H) 70 - 99 mg/dL Final   06/24/2021 220 (H) 70 - 99 mg/dL Final     GLUCOSE BY METER POCT   Date Value Ref Range Status   12/28/2022 339 (H) 70 - 99 mg/dL Final     Urea Nitrogen   Date Value Ref Range Status   01/24/2023 26.8 (H) 6.0 - 20.0 mg/dL Final   06/06/2022 14 7 - 30 mg/dL Final   06/24/2021 18 7 - 30 mg/dL Final     Creatinine   Date Value Ref Range Status   01/24/2023 0.66 (L) 0.67 - 1.17 mg/dL Final   06/24/2021 0.81 0.66 - 1.25 mg/dL Final     GFR Estimate   Date Value Ref Range Status   01/24/2023 >90 >60 mL/min/1.73m2 Final     Comment:     eGFR calculated using 2021 CKD-EPI equation.   06/24/2021 >90 >60 mL/min/[1.73_m2] Final     Comment:     Non  GFR Calc  Starting 12/18/2018, serum creatinine based estimated GFR (eGFR) will be   calculated using the Chronic Kidney Disease Epidemiology Collaboration   (CKD-EPI) equation.       Calcium   Date Value Ref Range Status   01/24/2023 9.2 8.6 - 10.0 mg/dL Final   06/24/2021 7.3 (L) 8.5 - 10.1 mg/dL Final     Bilirubin Total   Date Value Ref Range Status   01/24/2023 0.8 <=1.2 mg/dL Final   06/24/2021 0.4 0.2 - 1.3 mg/dL Final     Alkaline Phosphatase   Date Value Ref Range Status   01/24/2023 140 (H) 40 - 129 U/L Final   06/24/2021 98 40 - 150 U/L Final     ALT   Date Value Ref Range Status   01/24/2023 19 10 - 50 U/L Final   06/24/2021 20 0 - 70  U/L Final     AST   Date Value Ref Range Status   01/24/2023 24 10 - 50 U/L Final     Comment:     Specimen is hemolyzed which can falsely elevate AST. Analysis of a non-hemolyzed specimen may result in a lower value.   06/24/2021 12 0 - 45 U/L Final     CBC RESULTS: Recent Labs   Lab Test 01/24/23  1734   WBC 14.8*   RBC 4.91   HGB 14.6   HCT 43.8   MCV 89   MCH 29.7   MCHC 33.3   RDW 12.2   *     Today's Vitals: There were no vitals taken for this visit.  ----------------  Post Discharge Medication Reconciliation Status: discharge medications reconciled, continue medications without change.    I spent 30 minutes with this patient today. All changes were made via collaborative practice agreement with Dr. Dailey. A copy of the visit note was provided to the patient's provider(s).    A summary of these recommendations was declined by the patient.     Medication Therapy Recommendations  Type 2 diabetes mellitus (H)    Current Medication: metFORMIN (GLUCOPHAGE XR) 500 MG 24 hr tablet   Rationale: Medication requires monitoring - Needs additional monitoring   Recommendation: Self-Monitoring   Status: Accepted per CPA

## 2023-01-25 NOTE — ED PROVIDER NOTES
Sign Out Provider: Dr. Mcadams    Sign Out Plan: 55 year old male, well-known to the ED, with PMH notable for HIV, prior SBOs and adynamic ileus, DM2, HLD, GERD, chronic abdominal pain, prior toxoplasmosis, history of substance abuse, PTSD, recent COVID-19 diagnosis (for which he was prescribed Paxlovid) who presents to the ED for evaluation of abdominal pain.  Pending at the time of signout are pain medication, nausea medication, CT scan, and disposition.    Reassessment: On reevaluation patient resting comfortably, no distress.  Patient reports improvement of the symptoms after IV pain medication, nausea medicine.  I reviewed CT scan which demonstrates multiple fluid-filled loops of small bowel some of which have mild wall thickening without dilation, could be seen with enteritis.  Incidental chronic findings, no acute pathology.  Abdomen remains soft, nontender, nondistended, no peritoneal signs.  Patient tolerated water and crackers in the emergency department without difficulty.  I discussed results with patient.  At this time no emergent need for hospitalization, plan for discharge home with continue supportive care.  Close outpatient follow-up recommended and strict return precautions discussed if high fever, severe pain, persistent vomiting, new or worsening symptoms.  Patient understands and agrees with plan.    Disposition: Discharge       Sheila Burno MD  01/25/23 0038

## 2023-01-25 NOTE — PROGRESS NOTES
[facility]    Division of Infectious Diseases and International Medicine    CLINICAL INFECTIOUS DISEASE OUTPATIENT FOLLOW UP NOTE     Patient:  Andrew Lockwood, Date of birth 11/27/1967, Medical record number [id number]  Referred by: No ref. provider found   Reason for Visit: Follow up          Assessment and Plan   HIV  Diagnosed 2015; previous therapy with Triumeq, Genvoya, now on Biktarvy. Has been undetectable for the last few years with relatively good adherence (though missing up to 10 doses per month). Social barriers to care include being unhoused - though has a stable living situation at [facility]. HIV VL undetectable in 10/2022, his CD4 has been >200. I will refill his Biktarvy and encouraged continued efforts at adherence. He does have some interest in [place] though at this time inconsistent follow up with care providers is his main barrier.     T2DM, poorly controlled  Supposed to be on Metformin and Insulin. Reports compliance with Metformin but does not have a place to refrigerate his insulin at [facility] and therefore has not been using it. Would look into alternative oral and/or injectable options for him and he has follow up with [facility] next week to explore these options. Last A1C 12.8, goal <7 given age and comorbidity.     GERD   Refill Famotidine per patient request     LSIL  Diagnosed 6/22/18. Follows with CRS but exams and follow up has not been consistent. 9/9/20 anoscopy and fulguration in the OR. 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 No invasive malignancy. Recommend follow up. Will need to address at next follow up as this is my first time meeting him.     RTC: 3-4 months        History of Present Illness:   Last seen in clinic 8/2021 by Dr Ivory. Since then states that he has been doing well and has no acute concerns. Still at [facility], not currently working. Denies food insecurity. Has struggled with his diabetes control - no place to  refrigerate insulin, no control over his meals at the shelter. Was seen        Key Prior Lab/Imaging and other data   10/2022 HIV RNA undetectable   2022 HIV RNA undetectable   2021 HIV RNA <20        HIV History:   Date of Diagnosis: 2015   Approximated time of transmission:   CD4 Josue: 24/2%  Viral Load at Diagnosis: 171,654  Opportunistic Infections: CNS toxoplasmosis   CMV Status: seropositive   Toxo Status: positive , h/o CNS toxoplasmosis s/p Clindamycin and Pyrimethamine then transitioned to Bactrim ppx now off of ppx   Tuberculosis Screenin negative   Historical use of ARVs: 2015 started Triumeq, 2015 switched to Genvoya, 2019 switched to Biktarvy   Historical Resistance Mutations: not in system 2018 genotype cancelled given undetectable                  Review of Systems:     The following systems were reviewed with the patient as they pertain to the case and are negative unless noted here or above in the HPI. The patient was  able to participate in the following review of systems    REVIEW OF SYSTEMS:   Constitutional: No fevers, no chills, no sweats  Cardiac: No history of chest pain, No palpitations  Respiratory: No shortness of breath, no wheeze, no cough  Gastro Intestinal: No history of abdominal pain, no vomiting, no diarrhea  Neurological: No headaches, no new or changing weakness, no new or changing numbness  Musculoskeletal: No joint pain or swelling HEENT: No congestion No stridor  Psychiatric: No reported hallucinations, No obvious delusions  Allergic: No Hives No Rash  Hematologic: No Easy Bruising, No Nosebleeds,   Genitourinary: No Dysuria, No Frequency  Endocrine: No Polyuria, No Polydipsia  Skin/Integumentary: No Rash, No Ulcer  Opthalmologic: No Diplopia, No Flashes        Other Medical History:     Attempt was made to collect past, family and social history during this encounter,  this information was reviewed with the patient and updated    Allergies  Fentanyl, Tylenol [acetaminophen], Dulaglutide, Insulin lispro, and Penicillin v    Past Medical History  Past Medical History:   Diagnosis Date     AIDS (H)      Allergic rhinitis due to other allergen     DNS     Anal dysplasia      Chronic abdominal pain      CNS toxoplasmosis (H)      Diabetes type 2, controlled (H)      GERD (gastroesophageal reflux disease)      History of seizure      History of substance abuse (H)      HIV (human immunodeficiency virus infection) (H)      HLD (hyperlipidemia)      Lung nodules      Periungual wart      PTSD (post-traumatic stress disorder)      Sleep apnea     doesn't use CPAP    PastSurgical History   has a past surgical history that includes NONSPECIFIC PROCEDURE; EXPLORE UNDESC TESTIS,INGUIN/SCROTAL; Optical tracking system craniotomy, excise tumor, combined (Left, 4/10/2015); Colonoscopy (Left, 1/22/2016); Laparoscopy diagnostic (general) (N/A, 7/26/2016); Repair ligament ulnar collateral thumb (gamekeeper's) (Right, 12/2/2016); Laparoscopic appendectomy (N/A, 1/31/2018); Laparoscopy diagnostic (general) (N/A, 4/16/2018); Anoscopy (N/A, 9/9/2020); and Esophagoscopy, gastroscopy, duodenoscopy (EGD), combined (N/A, 6/6/2022).   Family History  Family History   Problem Relation Age of Onset     Diabetes Brother      Diabetes Father      Alzheimer Disease Father      Unknown/Adopted Mother      Diabetes Paternal Grandfather      Cancer No family hx of         no skin cancer     Skin Cancer No family hx of         no famiy hx of skin cancer     Glaucoma No family hx of      Macular Degeneration No family hx of     Social History  He reports that he has been smoking cigarettes. He has a 10.00 pack-year smoking history. He has quit using smokeless tobacco. He reports that he does not currently use drugs after having used the following drugs: Marijuana and Methamphetamines. He reports that he does not drink alcohol.   Notable Exposures Listed below if pertinent    Vaccination  History:  Immunization History   Administered Date(s) Administered     COVID-19 Vaccine 12+ (Pfizer) 06/17/2021, 07/29/2021, 02/10/2022     HepB 03/10/2016, 06/16/2016     HepB-Adult 08/31/2017     Influenza (IIV3) PF 10/17/2016     Influenza Vaccine 50-64 or 18-64 w/egg allergy (Flublok) 01/17/2020, 09/28/2020     Influenza Vaccine >6 months (Alfuria,Fluzone) 12/22/2015, 12/14/2017     Mantoux Tuberculin Skin Test 04/12/2015     Meningococcal ACWY (Menactra ) 08/31/2015, 03/10/2016     Pneumo Conj 13-V (2010&after) 03/10/2016     Pneumococcal 23 valent 06/16/2016     TDAP Vaccine (Boostrix) 10/17/2016     Tdap (Adacel,Boostrix) 06/18/2014             Physical Exam:     VITAL SIGNS:  Blood pressure 116/78, pulse 97, temperature 98.4  F (36.9  C), temperature source Oral, weight 63.5 kg (140 lb), SpO2 97 %.     GENERAL APPEARANCE: Not in acute distress  PHYSICAL EXAM:  Eyes:     No ptosis, no discharge, no scleral icterus  Mouth, Throat:     Mucous membranes moist, pharynx normal without lesions  Cardiovascular:    Inspection: No Cyanosis, JVD not elevated  Respiratory:     Inspection: Not in respiratory distress, Chest expansion symmetrical  Musculoskeletal:     No gross deformity or swelling, no quadriceps calf or upper arm muscular tenderness noted  Skin:     Dry and intact  Neurologic:     Higher Mental Function: Conversant, AOx4   Facial asymmetry grossly absent   Patient is ambulatory   Psychiatric:     Appropriate        Signature:     Anna Dailey MD   Infectious Disease Fellow    Case discussed with the supervising attending ID physician, Dr Austin     This dictation was prepared in part using Dragon recognition software.  As a result errors may occur. When identified these transcription errors have been corrected.  While every attempt is made to correct errors during dictation, errors may still exist

## 2023-01-25 NOTE — LETTER
1/25/2023       RE: Andrew Lockwood  [address] [facility] [address] [address] [address]     Dear Colleague,    Thank you for referring your patient, Andrew Lockwood, to the [facility] INFECTIOUS DISEASE CLINIC [place] at [facility]. Please see a copy of my visit note below.     [facility]    Division of Infectious Diseases and International Medicine    CLINICAL INFECTIOUS DISEASE OUTPATIENT FOLLOW UP NOTE     Patient:  Andrew Lockwood, Date of birth 11/27/1967, Medical record number [id number]  Referred by: No ref. provider found   Reason for Visit: Follow up          Assessment and Plan   HIV  Diagnosed 2015; previous therapy with Triumeq, Genvoya, now on Biktarvy. Has been undetectable for the last few years with relatively good adherence (though missing up to 10 doses per month). Social barriers to care include being unhoused - though has a stable living situation at [facility]. HIV VL undetectable in 10/2022, his CD4 has been >200. I will refill his Biktarvy and encouraged continued efforts at adherence. He does have some interest in Cabenuva though at this time inconsistent follow up with care providers is his main barrier.     T2DM, poorly controlled  Supposed to be on Metformin and Insulin. Reports compliance with Metformin but does not have a place to refrigerate his insulin at [facility] and therefore has not been using it. Would look into alternative oral and/or injectable options for him and he has follow up with [facility] next week to explore these options. Last A1C 12.8, goal <7 given age and comorbidity.     GERD   Refill Famotidine per patient request     LSIL  Diagnosed 6/22/18. Follows with [facility] but exams and follow up has not been consistent. 9/9/20 anoscopy and fulguration in the OR. 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 No invasive malignancy. Recommend follow up. Will need to address at next follow up as this is  my first time meeting him.     RTC: 3-4 months        History of Present Illness:   Last seen in clinic 2021 by Dr Ivory. Since then states that he has been doing well and has no acute concerns. Still at [facility], not currently working. Denies food insecurity. Has struggled with his diabetes control - no place to refrigerate insulin, no control over his meals at the shelter. Was seen        Key Prior Lab/Imaging and other data   10/2022 HIV RNA undetectable   2022 HIV RNA undetectable   2021 HIV RNA <20        HIV History:   Date of Diagnosis: 2015   Approximated time of transmission:   CD4 Josue: 24/2%  Viral Load at Diagnosis: 171,654  Opportunistic Infections: CNS toxoplasmosis   CMV Status: seropositive   Toxo Status: positive , h/o CNS toxoplasmosis s/p Clindamycin and Pyrimethamine then transitioned to Bactrim ppx now off of ppx   Tuberculosis Screenin negative   Historical use of ARVs: 2015 started Triumeq, 2015 switched to Genvoya, 2019 switched to Biktarvy   Historical Resistance Mutations: not in system 2018 genotype cancelled given undetectable                  Review of Systems:     The following systems were reviewed with the patient as they pertain to the case and are negative unless noted here or above in the HPI. The patient was  able to participate in the following review of systems    REVIEW OF SYSTEMS:   Constitutional: No fevers, no chills, no sweats  Cardiac: No history of chest pain, No palpitations  Respiratory: No shortness of breath, no wheeze, no cough  Gastro Intestinal: No history of abdominal pain, no vomiting, no diarrhea  Neurological: No headaches, no new or changing weakness, no new or changing numbness  Musculoskeletal: No joint pain or swelling HEENT: No congestion No stridor  Psychiatric: No reported hallucinations, No obvious delusions  Allergic: No Hives No Rash  Hematologic: No Easy Bruising, No Nosebleeds,   Genitourinary: No Dysuria, No  Frequency  Endocrine: No Polyuria, No Polydipsia  Skin/Integumentary: No Rash, No Ulcer  Opthalmologic: No Diplopia, No Flashes        Other Medical History:     Attempt was made to collect past, family and social history during this encounter,  this information was reviewed with the patient and updated    Allergies Fentanyl, Tylenol [acetaminophen], Dulaglutide, Insulin lispro, and Penicillin v    Past Medical History  Past Medical History:   Diagnosis Date     AIDS (H)      Allergic rhinitis due to other allergen     DNS     Anal dysplasia      Chronic abdominal pain      CNS toxoplasmosis (H)      Diabetes type 2, controlled (H)      GERD (gastroesophageal reflux disease)      History of seizure      History of substance abuse (H)      HIV (human immunodeficiency virus infection) (H)      HLD (hyperlipidemia)      Lung nodules      Periungual wart      PTSD (post-traumatic stress disorder)      Sleep apnea     doesn't use CPAP    PastSurgical History   has a past surgical history that includes NONSPECIFIC PROCEDURE; EXPLORE UNDESC TESTIS,INGUIN/SCROTAL; Optical tracking system craniotomy, excise tumor, combined (Left, 4/10/2015); Colonoscopy (Left, 1/22/2016); Laparoscopy diagnostic (general) (N/A, 7/26/2016); Repair ligament ulnar collateral thumb (gamekeeper's) (Right, 12/2/2016); Laparoscopic appendectomy (N/A, 1/31/2018); Laparoscopy diagnostic (general) (N/A, 4/16/2018); Anoscopy (N/A, 9/9/2020); and Esophagoscopy, gastroscopy, duodenoscopy (EGD), combined (N/A, 6/6/2022).   Family History  Family History   Problem Relation Age of Onset     Diabetes Brother      Diabetes Father      Alzheimer Disease Father      Unknown/Adopted Mother      Diabetes Paternal Grandfather      Cancer No family hx of         no skin cancer     Skin Cancer No family hx of         no famiy hx of skin cancer     Glaucoma No family hx of      Macular Degeneration No family hx of     Social History  He reports that he has been  smoking cigarettes. He has a 10.00 pack-year smoking history. He has quit using smokeless tobacco. He reports that he does not currently use drugs after having used the following drugs: Marijuana and Methamphetamines. He reports that he does not drink alcohol.   Notable Exposures Listed below if pertinent    Vaccination History:  Immunization History   Administered Date(s) Administered     COVID-19 Vaccine 12+ (Pfizer) 06/17/2021, 07/29/2021, 02/10/2022     HepB 03/10/2016, 06/16/2016     HepB-Adult 08/31/2017     Influenza (IIV3) PF 10/17/2016     Influenza Vaccine 50-64 or 18-64 w/egg allergy (Flublok) 01/17/2020, 09/28/2020     Influenza Vaccine >6 months (Alfuria,Fluzone) 12/22/2015, 12/14/2017     Mantoux Tuberculin Skin Test 04/12/2015     Meningococcal ACWY (Menactra ) 08/31/2015, 03/10/2016     Pneumo Conj 13-V (2010&after) 03/10/2016     Pneumococcal 23 valent 06/16/2016     TDAP Vaccine (Boostrix) 10/17/2016     Tdap (Adacel,Boostrix) 06/18/2014             Physical Exam:     VITAL SIGNS:  Blood pressure 116/78, pulse 97, temperature 98.4  F (36.9  C), temperature source Oral, weight 63.5 kg (140 lb), SpO2 97 %.     GENERAL APPEARANCE: Not in acute distress  PHYSICAL EXAM:  Eyes:     No ptosis, no discharge, no scleral icterus  Mouth, Throat:     Mucous membranes moist, pharynx normal without lesions  Cardiovascular:    Inspection: No Cyanosis, JVD not elevated  Respiratory:     Inspection: Not in respiratory distress, Chest expansion symmetrical  Musculoskeletal:     No gross deformity or swelling, no quadriceps calf or upper arm muscular tenderness noted  Skin:     Dry and intact  Neurologic:     Higher Mental Function: Conversant, AOx4   Facial asymmetry grossly absent   Patient is ambulatory   Psychiatric:     Appropriate        Signature:     Anna Dailey MD   Infectious Disease Fellow    Case discussed with the supervising attending ID physician, Dr Austin     This dictation was prepared  in part using Dragon recognition software.  As a result errors may occur. When identified these transcription errors have been corrected.  While every attempt is made to correct errors during dictation, errors may still exist       During his clinic visit today, he expressed worries about the status of his benefits. We have not seen him for awhile to update his eligibility but we were able to confirm he has been asked to apply [facility]. The extension of prg  will end Jan 31. I offered to assist, the application will required info from his Green Card which he states is being stored at his . He indicated he would return 01/26 to complete this process.

## 2023-01-25 NOTE — Clinical Note
Do you think we'd have any luck getting Jaya new glucometer? He lost his again. I'm thinking he may have exhausted his ability to get a new one but want to double check!

## 2023-01-26 ENCOUNTER — TELEPHONE (OUTPATIENT)
Dept: INFECTIOUS DISEASES | Facility: CLINIC | Age: 58
End: 2023-01-26
Payer: MEDICAID

## 2023-01-26 NOTE — TELEPHONE ENCOUNTER
EP unable to call/ LVM 1/26; phone not working. Tried to call to schedule 2 month (around 3/25) follow up with Dr. Dailey or any B20 provider per checkout notes from 1/25.

## 2023-01-28 NOTE — PROGRESS NOTES
"[facility] Staff Informal Visit Documentation Note: Mr. Dominguez came to [facility] for an appointment today and was initially seen by Dr. Dailey, ID Fellow and other clinic staff members, but was found to lack health insurance, so the scheduled appointment was functionall cancelled and he was not formally seen by me -- I did greet him briefly today.  Despite Dr. Dailey's excellent work, this was not a formal clinic appointment today and so he has been \"No charged\", but we plan to see him again formally when his insurance has been re-instated.  "

## 2023-02-01 ENCOUNTER — HOSPITAL ENCOUNTER (EMERGENCY)
Facility: CLINIC | Age: 58
Discharge: HOME | End: 2023-02-02
Attending: EMERGENCY MEDICINE | Admitting: EMERGENCY MEDICINE
Payer: MEDICAID

## 2023-02-01 DIAGNOSIS — R10.13 ABDOMINAL PAIN, EPIGASTRIC: ICD-10-CM

## 2023-02-01 LAB
ALBUMIN SERPL-MCNC: 3.3 G/DL (ref 3.4–5)
ALP SERPL-CCNC: 147 U/L (ref 40–150)
ALT SERPL W P-5'-P-CCNC: 31 U/L (ref 0–70)
ANION GAP SERPL CALCULATED.3IONS-SCNC: 6 MMOL/L (ref 3–14)
AST SERPL W P-5'-P-CCNC: 15 U/L (ref 0–45)
ATRIAL RATE - MUSE: 83 BPM
BASOPHILS # BLD AUTO: 0.1 10E3/UL (ref 0–0.2)
BASOPHILS NFR BLD AUTO: 1 %
BILIRUB SERPL-MCNC: 0.4 MG/DL (ref 0.2–1.3)
BUN SERPL-MCNC: 22 MG/DL (ref 7–30)
CALCIUM SERPL-MCNC: 9.7 MG/DL (ref 8.5–10.1)
CHLORIDE BLD-SCNC: 101 MMOL/L (ref 94–109)
CO2 SERPL-SCNC: 29 MMOL/L (ref 20–32)
CREAT SERPL-MCNC: 0.55 MG/DL (ref 0.66–1.25)
DIASTOLIC BLOOD PRESSURE - MUSE: NORMAL MMHG
EOSINOPHIL # BLD AUTO: 0.1 10E3/UL (ref 0–0.7)
EOSINOPHIL NFR BLD AUTO: 2 %
ERYTHROCYTE [DISTWIDTH] IN BLOOD BY AUTOMATED COUNT: 11.9 % (ref 10–15)
GFR SERPL CREATININE-BSD FRML MDRD: >90 ML/MIN/1.73M2
GLUCOSE BLD-MCNC: 300 MG/DL (ref 70–99)
HCT VFR BLD AUTO: 41.7 % (ref 40–53)
HGB BLD-MCNC: 14.1 G/DL (ref 13.3–17.7)
IMM GRANULOCYTES # BLD: 0.1 10E3/UL
IMM GRANULOCYTES NFR BLD: 1 %
INTERPRETATION ECG - MUSE: NORMAL
LIPASE SERPL-CCNC: 226 U/L (ref 73–393)
LYMPHOCYTES # BLD AUTO: 2.1 10E3/UL (ref 0.8–5.3)
LYMPHOCYTES NFR BLD AUTO: 27 %
MCH RBC QN AUTO: 29.1 PG (ref 26.5–33)
MCHC RBC AUTO-ENTMCNC: 33.8 G/DL (ref 31.5–36.5)
MCV RBC AUTO: 86 FL (ref 78–100)
MONOCYTES # BLD AUTO: 0.5 10E3/UL (ref 0–1.3)
MONOCYTES NFR BLD AUTO: 7 %
NEUTROPHILS # BLD AUTO: 5 10E3/UL (ref 1.6–8.3)
NEUTROPHILS NFR BLD AUTO: 62 %
NRBC # BLD AUTO: 0 10E3/UL
NRBC BLD AUTO-RTO: 0 /100
P AXIS - MUSE: 37 DEGREES
PLATELET # BLD AUTO: 416 10E3/UL (ref 150–450)
POTASSIUM BLD-SCNC: 3.8 MMOL/L (ref 3.4–5.3)
PR INTERVAL - MUSE: 134 MS
PROT SERPL-MCNC: 7.5 G/DL (ref 6.8–8.8)
QRS DURATION - MUSE: 88 MS
QT - MUSE: 364 MS
QTC - MUSE: 427 MS
R AXIS - MUSE: -30 DEGREES
RBC # BLD AUTO: 4.84 10E6/UL (ref 4.4–5.9)
SODIUM SERPL-SCNC: 136 MMOL/L (ref 133–144)
SYSTOLIC BLOOD PRESSURE - MUSE: NORMAL MMHG
T AXIS - MUSE: 19 DEGREES
TROPONIN I SERPL HS-MCNC: 4 NG/L
VENTRICULAR RATE- MUSE: 83 BPM
WBC # BLD AUTO: 7.8 10E3/UL (ref 4–11)

## 2023-02-01 PROCEDURE — 93010 ELECTROCARDIOGRAM REPORT: CPT | Performed by: EMERGENCY MEDICINE

## 2023-02-01 PROCEDURE — 250N000013 HC RX MED GY IP 250 OP 250 PS 637: Performed by: EMERGENCY MEDICINE

## 2023-02-01 PROCEDURE — 83690 ASSAY OF LIPASE: CPT | Performed by: EMERGENCY MEDICINE

## 2023-02-01 PROCEDURE — 85025 COMPLETE CBC W/AUTO DIFF WBC: CPT | Performed by: EMERGENCY MEDICINE

## 2023-02-01 PROCEDURE — 93005 ELECTROCARDIOGRAM TRACING: CPT | Performed by: EMERGENCY MEDICINE

## 2023-02-01 PROCEDURE — 96374 THER/PROPH/DIAG INJ IV PUSH: CPT | Performed by: EMERGENCY MEDICINE

## 2023-02-01 PROCEDURE — C9113 INJ PANTOPRAZOLE SODIUM, VIA: HCPCS | Performed by: EMERGENCY MEDICINE

## 2023-02-01 PROCEDURE — 99285 EMERGENCY DEPT VISIT HI MDM: CPT | Mod: 25 | Performed by: EMERGENCY MEDICINE

## 2023-02-01 PROCEDURE — 36415 COLL VENOUS BLD VENIPUNCTURE: CPT | Performed by: EMERGENCY MEDICINE

## 2023-02-01 PROCEDURE — 99284 EMERGENCY DEPT VISIT MOD MDM: CPT | Mod: 25 | Performed by: EMERGENCY MEDICINE

## 2023-02-01 PROCEDURE — 80053 COMPREHEN METABOLIC PANEL: CPT | Performed by: EMERGENCY MEDICINE

## 2023-02-01 PROCEDURE — 84484 ASSAY OF TROPONIN QUANT: CPT | Performed by: EMERGENCY MEDICINE

## 2023-02-01 PROCEDURE — 250N000009 HC RX 250: Performed by: EMERGENCY MEDICINE

## 2023-02-01 PROCEDURE — 250N000011 HC RX IP 250 OP 636: Performed by: EMERGENCY MEDICINE

## 2023-02-01 PROCEDURE — 96361 HYDRATE IV INFUSION ADD-ON: CPT | Performed by: EMERGENCY MEDICINE

## 2023-02-01 RX ADMIN — PANTOPRAZOLE SODIUM 40 MG: 40 INJECTION, POWDER, FOR SOLUTION INTRAVENOUS at 20:20

## 2023-02-01 RX ADMIN — ALUMINUM HYDROXIDE, MAGNESIUM HYDROXIDE, AND DIMETHICONE 30 ML: 200; 20; 200 SUSPENSION ORAL at 22:29

## 2023-02-01 ASSESSMENT — ACTIVITIES OF DAILY LIVING (ADL)
ADLS_ACUITY_SCORE: 37
ADLS_ACUITY_SCORE: 37

## 2023-02-02 ENCOUNTER — APPOINTMENT (OUTPATIENT)
Dept: GENERAL RADIOLOGY | Facility: CLINIC | Age: 58
End: 2023-02-02
Attending: EMERGENCY MEDICINE
Payer: MEDICAID

## 2023-02-02 ENCOUNTER — APPOINTMENT (OUTPATIENT)
Dept: ULTRASOUND IMAGING | Facility: CLINIC | Age: 58
End: 2023-02-02
Attending: EMERGENCY MEDICINE
Payer: MEDICAID

## 2023-02-02 VITALS
TEMPERATURE: 98.3 F | SYSTOLIC BLOOD PRESSURE: 120 MMHG | OXYGEN SATURATION: 98 % | DIASTOLIC BLOOD PRESSURE: 84 MMHG | RESPIRATION RATE: 17 BRPM | HEART RATE: 95 BPM

## 2023-02-02 LAB — GLUCOSE BLDC GLUCOMTR-MCNC: 323 MG/DL (ref 70–99)

## 2023-02-02 PROCEDURE — 76705 ECHO EXAM OF ABDOMEN: CPT | Mod: 26 | Performed by: RADIOLOGY

## 2023-02-02 PROCEDURE — 96361 HYDRATE IV INFUSION ADD-ON: CPT | Performed by: EMERGENCY MEDICINE

## 2023-02-02 PROCEDURE — 250N000013 HC RX MED GY IP 250 OP 250 PS 637: Performed by: EMERGENCY MEDICINE

## 2023-02-02 PROCEDURE — 76705 ECHO EXAM OF ABDOMEN: CPT

## 2023-02-02 PROCEDURE — 258N000003 HC RX IP 258 OP 636: Performed by: EMERGENCY MEDICINE

## 2023-02-02 PROCEDURE — 82962 GLUCOSE BLOOD TEST: CPT

## 2023-02-02 PROCEDURE — 74019 RADEX ABDOMEN 2 VIEWS: CPT

## 2023-02-02 RX ORDER — CALCIUM CARBONATE 500 MG/1
500 TABLET, CHEWABLE ORAL DAILY PRN
Status: DISCONTINUED | OUTPATIENT
Start: 2023-02-02 | End: 2023-02-02 | Stop reason: HOSPADM

## 2023-02-02 RX ORDER — SODIUM CHLORIDE 9 MG/ML
INJECTION, SOLUTION INTRAVENOUS CONTINUOUS
Status: DISCONTINUED | OUTPATIENT
Start: 2023-02-02 | End: 2023-02-02 | Stop reason: HOSPADM

## 2023-02-02 RX ORDER — SODIUM CHLORIDE 9 MG/ML
INJECTION, SOLUTION INTRAVENOUS
Status: DISCONTINUED
Start: 2023-02-02 | End: 2023-02-02 | Stop reason: HOSPADM

## 2023-02-02 RX ADMIN — SODIUM CHLORIDE 1000 ML: 9 INJECTION, SOLUTION INTRAVENOUS at 01:30

## 2023-02-02 RX ADMIN — CALCIUM CARBONATE (ANTACID) CHEW TAB 500 MG 500 MG: 500 CHEW TAB at 01:27

## 2023-02-02 ASSESSMENT — ACTIVITIES OF DAILY LIVING (ADL)
ADLS_ACUITY_SCORE: 37
ADLS_ACUITY_SCORE: 37

## 2023-02-02 NOTE — ED NOTES
Bed: ED19  Expected date:   Expected time:   Means of arrival:   Comments:  Melvin [age] y/o M abdominal pain

## 2023-02-02 NOTE — ED PROVIDER NOTES
[facility] EMERGENCY DEPARTMENT ([facility])    2/01/23      ED PROVIDER NOTE   ED 19  7:44 PM   History     Chief Complaint   Patient presents with     Abdominal Pain     BIBA from [facility] shelter for abdominal pain. Hx of bowel obstruction.  for EMS.      The history is provided by the patient and medical records.     Andrew Collier is a 57 year old male with history of HIV (most recent absolute CD4 count was 365 on 9/8/22, undetectable viral load from 10/13/22), type 2 diabetes, chronic abdominal pain, recurrent small bowel obstruction/adynamic ileus, prior appendectomy, homelessness who presents from [facility] with abdominal pain and heartburn that started 5 hours ago. The pain is in epigastric region. He states this abdominal pain feels different from his prior SBOs. He has had nausea and vomited once. No hematemesis. No fevers. No history of cholecystectomy. He notes having surgery to help evaluate why he has recurrent SBOs. He denies alcohol use. He denies ibuprofen use. No black or bloody stools.     Past Medical History  Past Medical History:   Diagnosis Date     AIDS (H)      Allergic rhinitis due to other allergen     DNS     Anal dysplasia      Chronic abdominal pain      CNS toxoplasmosis (H)      Diabetes type 2, controlled (H)      GERD (gastroesophageal reflux disease)      History of seizure      History of substance abuse (H)      HIV (human immunodeficiency virus infection) (H)      HLD (hyperlipidemia)      Lung nodules      Periungual wart      PTSD (post-traumatic stress disorder)      Sleep apnea     doesn't use CPAP     Past Surgical History:   Procedure Laterality Date     ANOSCOPY N/A 9/9/2020    Procedure: Exam Under Anesthesia, ANOSCOPY, fulgeration of rectal fissures with Rectal Biopsies;  Surgeon: Thanh Lundberg MD;  Location: [facility] OR     COLONOSCOPY Left 1/22/2016    Procedure: COMBINED COLONOSCOPY, SINGLE OR MULTIPLE BIOPSY/POLYPECTOMY BY BIOPSY;  " Surgeon: Clark Saini MD;  Location: [facility] GI     ESOPHAGOSCOPY, GASTROSCOPY, DUODENOSCOPY (EGD), COMBINED N/A 6/6/2022    Procedure: ESOPHAGOGASTRODUODENOSCOPY, WITH BIOPSY;  Surgeon: Kecia Benítez MD;  Location: [facility] GI     HC EXPLORE UNDESC TESTIS,INGUIN/SCROTAL       LAPAROSCOPIC APPENDECTOMY N/A 1/31/2018    Procedure: LAPAROSCOPIC APPENDECTOMY;  LAPAROSCOPIC APPENDECTOMY;  Surgeon: Dawn Holt MD;  Location: [facility] OR     LAPAROSCOPY DIAGNOSTIC (GENERAL) N/A 7/26/2016    Procedure: LAPAROSCOPY DIAGNOSTIC (GENERAL);  Surgeon: Susannah Arriaga MD;  Location: [facility] OR     LAPAROSCOPY DIAGNOSTIC (GENERAL) N/A 4/16/2018    Procedure: LAPAROSCOPY DIAGNOSTIC (GENERAL);  Diagnostic laparoscopy and lysis of adhesions;  Surgeon: Prince Dowling MD;  Location: [facility] OR     OPTICAL TRACKING SYSTEM CRANIOTOMY, EXCISE TUMOR, COMBINED Left 4/10/2015    Procedure: COMBINED OPTICAL TRACKING SYSTEM CRANIOTOMY, EXCISE TUMOR;  Surgeon: Mirlande Colmenares MD;  Location: [facility] OR     REPAIR GAMEKEEPER'S THUMB Right 12/2/2016    Procedure: REPAIR LIGAMENT ULNAR COLLATERAL THUMB (GAMEKEEPER'S);  Surgeon: Evin Zamorano MD;  Location: [facility] OR     ZZC NONSPECIFIC PROCEDURE      right forearm fracture     bictegravir-emtricitabine-tenofovir (BIKTARVY) -25 MG per tablet  blood glucose (NO BRAND SPECIFIED) lancets standard  blood glucose (NO BRAND SPECIFIED) test strip  blood glucose monitoring (NO BRAND SPECIFIED) meter device kit  famotidine (PEPCID) 40 MG tablet  metFORMIN (GLUCOPHAGE XR) 500 MG 24 hr tablet      Allergies   Allergen Reactions     Fentanyl Blisters     Per pt, after taking this medication had blisters develope     Tylenol [Acetaminophen] Itching     Dulaglutide Rash     Insulin Lispro Rash     Patient reported     Penicillin V Other (See Comments) and Rash     Diffuse maculopapular rash + feels \"high\", per pt.      Family History  Family History   Problem Relation Age of Onset     " Diabetes Brother      Diabetes Father      Alzheimer Disease Father      Unknown/Adopted Mother      Diabetes Paternal Grandfather      Cancer No family hx of         no skin cancer     Skin Cancer No family hx of         no famiy hx of skin cancer     Glaucoma No family hx of      Macular Degeneration No family hx of      Social History   Social History     Tobacco Use     Smoking status: Some Days     Packs/day: 0.25     Years: 40.00     Pack years: 10.00     Types: Cigarettes     Smokeless tobacco: Former   Substance Use Topics     Alcohol use: No     Alcohol/week: 0.0 standard drinks     Comment: Last etoh in 2007     Drug use: Not Currently     Types: Marijuana, Methamphetamines         A medically appropriate review of systems was performed with pertinent positives and negatives noted in the HPI, and all other systems negative.     Physical Exam   BP: (!) 137/95  Pulse: 87  Temp: 98.3  F (36.8  C)  Resp: 20  SpO2: 98 %      General: awake, alert, holding an emesis bag  Head: normal cephalic  HEENT: pupils equal, conjugate gaze intact  Neck: Supple  CV: regular rate and rhythm without murmur  Lungs: clear to auscultation  Abd: soft, mild epigastric tenderness otherwise non-tender, no guarding, no peritoneal signs  EXT: lower extremities without swelling or edema  Neuro: awake, answers questions appropriately. No focal deficits noted       ED Course     Medications   calcium carbonate (TUMS) chewable tablet 500 mg (500 mg Oral Given 2/2/23 0127)   0.9% sodium chloride BOLUS (has no administration in time range)     Followed by   sodium chloride 0.9% infusion (has no administration in time range)   pantoprazole (PROTONIX) IV push injection 40 mg (40 mg Intravenous Given 2/1/23 2020)   lidocaine (viscous) (XYLOCAINE) 2 % 15 mL, alum & mag hydroxide-simethicone (MAALOX) 15 mL GI Cocktail (30 mLs Oral Given 2/1/23 2229)     Results for orders placed or performed during the hospital encounter of 02/01/23   Abdomen  XR, 2 vw, flat and upright     Status: None    Narrative    EXAM: XR ABDOMEN 2 VIEWS  LOCATION: [facility]  DATE/TIME: 2/2/2023 12:42 AM    INDICATION: abdominal pain, hx of SBO  COMPARISON: 1/24/2023      Impression    IMPRESSION: Negative abdomen. Bowel gas pattern is normal. Moderate stool throughout colon. Nothing for obstruction or free air. No evidence for renal stones.   Comprehensive metabolic panel     Status: Abnormal   Result Value Ref Range    Sodium 136 133 - 144 mmol/L    Potassium 3.8 3.4 - 5.3 mmol/L    Chloride 101 94 - 109 mmol/L    Carbon Dioxide (CO2) 29 20 - 32 mmol/L    Anion Gap 6 3 - 14 mmol/L    Urea Nitrogen 22 7 - 30 mg/dL    Creatinine 0.55 (L) 0.66 - 1.25 mg/dL    Calcium 9.7 8.5 - 10.1 mg/dL    Glucose 300 (H) 70 - 99 mg/dL    Alkaline Phosphatase 147 40 - 150 U/L    AST 15 0 - 45 U/L    ALT 31 0 - 70 U/L    Protein Total 7.5 6.8 - 8.8 g/dL    Albumin 3.3 (L) 3.4 - 5.0 g/dL    Bilirubin Total 0.4 0.2 - 1.3 mg/dL    GFR Estimate >90 >60 mL/min/1.73m2   Lipase     Status: Normal   Result Value Ref Range    Lipase 226 73 - 393 U/L   Troponin I     Status: Normal   Result Value Ref Range    Troponin I High Sensitivity 4 <79 ng/L   CBC with platelets and differential     Status: None   Result Value Ref Range    WBC Count 7.8 4.0 - 11.0 10e3/uL    RBC Count 4.84 4.40 - 5.90 10e6/uL    Hemoglobin 14.1 13.3 - 17.7 g/dL    Hematocrit 41.7 40.0 - 53.0 %    MCV 86 78 - 100 fL    MCH 29.1 26.5 - 33.0 pg    MCHC 33.8 31.5 - 36.5 g/dL    RDW 11.9 10.0 - 15.0 %    Platelet Count 416 150 - 450 10e3/uL    % Neutrophils 62 %    % Lymphocytes 27 %    % Monocytes 7 %    % Eosinophils 2 %    % Basophils 1 %    % Immature Granulocytes 1 %    NRBCs per 100 WBC 0 <1 /100    Absolute Neutrophils 5.0 1.6 - 8.3 10e3/uL    Absolute Lymphocytes 2.1 0.8 - 5.3 10e3/uL    Absolute Monocytes 0.5 0.0 - 1.3 10e3/uL    Absolute Eosinophils 0.1 0.0 - 0.7 10e3/uL    Absolute  Basophils 0.1 0.0 - 0.2 10e3/uL    Absolute Immature Granulocytes 0.1 <=0.4 10e3/uL    Absolute NRBCs 0.0 10e3/uL   EKG 12-lead, tracing only     Status: None   Result Value Ref Range    Systolic Blood Pressure  mmHg    Diastolic Blood Pressure  mmHg    Ventricular Rate 83 BPM    Atrial Rate 83 BPM    ND Interval 134 ms    QRS Duration 88 ms     ms    QTc 427 ms    P Axis 37 degrees    R AXIS -30 degrees    T Axis 19 degrees    Interpretation ECG       Sinus rhythm  Left axis deviation  Abnormal ECG  Unconfirmed report - interpretation of this ECG is computer generated - see medical record for final interpretation    Confirmed by - EMERGENCY ROOM, PHYSICIAN ([id number]),  MARTIN CASTELLANO ([id number]) on 2/1/2023 11:40:28 PM     CBC with platelets differential     Status: None    Narrative    The following orders were created for panel order CBC with platelets differential.  Procedure                               Abnormality         Status                     ---------                               -----------         ------                     CBC with platelets and d...[[id number]]                      Final result                 Please view results for these tests on the individual orders.          Procedures            EKG Interpretation:      Interpreted by Parish Gleason MD  Time reviewed: 1953  Symptoms at time of EKG: Abdominal pain  Rhythm: normal sinus   Rate: 83  Axis: normal  Ectopy: none  Conduction: normal  ST Segments/ T Waves: No ST-T wave changes  Q Waves: none  Comparison to prior: Unchanged from prior EKG, no sign of acute ischemia    Clinical Impression: normal EKG         Medical Decision Making  The patient presented with a problem that is 2 or more stable chronic illnesses and a chronic illness mild to moderate exacerbation, progression, or side effect of treatment.    The patient's evaluation involved:  review of external note(s) from 2 sources (see separate area of note for details)  ordering  and/or review of 3+ test(s) in this encounter (see separate area of note for details)  review of 3+ test result(s) ordered prior to this encounter (see separate area of note for details)  strong consideration of a test (see separate area of note for details) that was ultimately deferred    The patient's management involved prescription drug management (including medications given in the ED) and a decision regarding hospitalization.     Assessments & Plan (with Medical Decision Making)   Andrew is a 57-year-old male who presents to the emergency department with abdominal pain.    On exam he is well-appearing, nontoxic.  He has a benign abdomen without guarding or peritoneal signs.  Normal vital signs.    Differential could include things like choledocholithiasis, pancreatitis, gastritis, small bowel obstruction, ACS.    Initial evaluation include laboratory studies and an EKG.  Also start with IV Protonix to see if this helps with his pain control given the epigastric nature with associated GERD.    EKG shows no signs of acute ischemia.  Troponin is negative.  Patient has an elevated glucose but no anion gap or associated acidosis.  LFTs appear normal, normal bilirubin.  Normal white count without a left shift.  Normal lipase.    Patient reported no improvement with Protonix, and attempted a GI cocktail.  Per chart review patient had a normal CT scan last week; patient has had 4 CT scans of the abdomen and pelvis in the last 2 months.  Given the normal CT scans, the normal laboratory studies, and the benign abdominal exam, and the normal vital signs I do not feel that repeat CT scan is in his best interest.  I did obtain a abdominal x-ray that did not show signs of obstruction.    On repeat assessment patient was endorsing ongoing GERD symptoms, he was given a GI cocktail which he states did help his symptoms but they returned.  He is now requesting Tums.    He continues to have normal vital signs throughout the ER  visit.  Repeat abdominal exam benign.  We discharged home with plan for outpatient follow-up.  Recommended GI scope might be the next appropriate test for him.  We will give IV fluids for the elevated glucose, he is not in DKA as he has no acidosis, no anion gap.  Normal bicarb.      Per chart review patient does have a history of gallstones.  We will do a right upper quadrant ultrasound.  If this is negative I feel that he is likely can be able to discharge.  Will anticipate discharge with GI follow-up.      I have reviewed the nursing notes.    I have reviewed the findings, diagnosis, plan and need for follow up with the patient.    New Prescriptions    No medications on file       Final diagnoses:   Abdominal pain, epigastric       I, Yadira Boss, am serving as a trained medical scribe to document services personally performed by Parish Gleason MD based on the provider's statements to me on February 1, 2023.  This document has been checked and approved by the attending provider.    I, Parish Gleason MD, was physically present and have reviewed and verified the accuracy of this note documented by Yadira Boss, medical scribe.      Parish Gleason MD     2/1/2023   [facility] EMERGENCY DEPARTMENT         Parish Gleason MD  02/02/23 0129       Parish Gleason MD  02/02/23 0133

## 2023-02-02 NOTE — DISCHARGE INSTRUCTIONS
Please follow-up with your primary care doctor.  Given your recurrent abdominal pain with recent negative evaluations you may need endoscopy and GI follow-up.  I will put in a referral for GI for you.  Return to the ER for new or worsening symptoms.

## 2023-02-06 ENCOUNTER — APPOINTMENT (OUTPATIENT)
Dept: CT IMAGING | Facility: CLINIC | Age: 58
End: 2023-02-06
Attending: EMERGENCY MEDICINE
Payer: MEDICAID

## 2023-02-06 ENCOUNTER — HOSPITAL ENCOUNTER (INPATIENT)
Facility: CLINIC | Age: 58
LOS: 1 days | Discharge: LEFT BEFORE DISCHARGE | End: 2023-02-08
Attending: EMERGENCY MEDICINE | Admitting: PEDIATRICS
Payer: MEDICAID

## 2023-02-06 DIAGNOSIS — K56.600 PARTIAL INTESTINAL OBSTRUCTION, UNSPECIFIED CAUSE (H): ICD-10-CM

## 2023-02-06 LAB
ALBUMIN SERPL BCG-MCNC: 4.4 G/DL (ref 3.5–5.2)
ALP SERPL-CCNC: 110 U/L (ref 40–129)
ALT SERPL W P-5'-P-CCNC: 18 U/L (ref 10–50)
ANION GAP SERPL CALCULATED.3IONS-SCNC: 15 MMOL/L (ref 7–15)
AST SERPL W P-5'-P-CCNC: 24 U/L (ref 10–50)
B-OH-BUTYR SERPL-MCNC: 0.5 MMOL/L
BASOPHILS # BLD AUTO: 0.1 10E3/UL (ref 0–0.2)
BASOPHILS NFR BLD AUTO: 1 %
BILIRUB SERPL-MCNC: 1.2 MG/DL
BUN SERPL-MCNC: 17.2 MG/DL (ref 8–23)
CALCIUM SERPL-MCNC: 9.9 MG/DL (ref 8.6–10)
CHLORIDE SERPL-SCNC: 92 MMOL/L (ref 98–107)
CREAT SERPL-MCNC: 0.8 MG/DL (ref 0.67–1.17)
DEPRECATED HCO3 PLAS-SCNC: 27 MMOL/L (ref 22–29)
EOSINOPHIL # BLD AUTO: 0 10E3/UL (ref 0–0.7)
EOSINOPHIL NFR BLD AUTO: 0 %
ERYTHROCYTE [DISTWIDTH] IN BLOOD BY AUTOMATED COUNT: 12.1 % (ref 10–15)
GFR SERPL CREATININE-BSD FRML MDRD: >90 ML/MIN/1.73M2
GLUCOSE SERPL-MCNC: 330 MG/DL (ref 70–99)
HCT VFR BLD AUTO: 49.3 % (ref 40–53)
HGB BLD-MCNC: 16.1 G/DL (ref 13.3–17.7)
IMM GRANULOCYTES # BLD: 0 10E3/UL
IMM GRANULOCYTES NFR BLD: 0 %
INR PPP: 0.98 (ref 0.85–1.15)
LACTATE SERPL-SCNC: 2.7 MMOL/L (ref 0.7–2)
LIPASE SERPL-CCNC: 61 U/L (ref 13–60)
LYMPHOCYTES # BLD AUTO: 2.3 10E3/UL (ref 0.8–5.3)
LYMPHOCYTES NFR BLD AUTO: 22 %
MCH RBC QN AUTO: 28.9 PG (ref 26.5–33)
MCHC RBC AUTO-ENTMCNC: 32.7 G/DL (ref 31.5–36.5)
MCV RBC AUTO: 89 FL (ref 78–100)
MONOCYTES # BLD AUTO: 0.8 10E3/UL (ref 0–1.3)
MONOCYTES NFR BLD AUTO: 7 %
NEUTROPHILS # BLD AUTO: 7.2 10E3/UL (ref 1.6–8.3)
NEUTROPHILS NFR BLD AUTO: 70 %
NRBC # BLD AUTO: 0 10E3/UL
NRBC BLD AUTO-RTO: 0 /100
PLATELET # BLD AUTO: 404 10E3/UL (ref 150–450)
POTASSIUM SERPL-SCNC: 4.6 MMOL/L (ref 3.4–5.3)
PROT SERPL-MCNC: 8.1 G/DL (ref 6.4–8.3)
RBC # BLD AUTO: 5.57 10E6/UL (ref 4.4–5.9)
SODIUM SERPL-SCNC: 134 MMOL/L (ref 136–145)
TROPONIN T SERPL HS-MCNC: 9 NG/L
WBC # BLD AUTO: 10.4 10E3/UL (ref 4–11)

## 2023-02-06 PROCEDURE — 87040 BLOOD CULTURE FOR BACTERIA: CPT | Performed by: EMERGENCY MEDICINE

## 2023-02-06 PROCEDURE — 71260 CT THORAX DX C+: CPT | Mod: 26 | Performed by: RADIOLOGY

## 2023-02-06 PROCEDURE — 96361 HYDRATE IV INFUSION ADD-ON: CPT | Performed by: EMERGENCY MEDICINE

## 2023-02-06 PROCEDURE — 250N000009 HC RX 250: Performed by: EMERGENCY MEDICINE

## 2023-02-06 PROCEDURE — 93010 ELECTROCARDIOGRAM REPORT: CPT | Performed by: EMERGENCY MEDICINE

## 2023-02-06 PROCEDURE — 258N000003 HC RX IP 258 OP 636: Performed by: EMERGENCY MEDICINE

## 2023-02-06 PROCEDURE — 74177 CT ABD & PELVIS W/CONTRAST: CPT

## 2023-02-06 PROCEDURE — 96360 HYDRATION IV INFUSION INIT: CPT | Mod: 59 | Performed by: EMERGENCY MEDICINE

## 2023-02-06 PROCEDURE — 250N000011 HC RX IP 250 OP 636: Performed by: EMERGENCY MEDICINE

## 2023-02-06 PROCEDURE — 83690 ASSAY OF LIPASE: CPT | Performed by: EMERGENCY MEDICINE

## 2023-02-06 PROCEDURE — 80053 COMPREHEN METABOLIC PANEL: CPT | Performed by: EMERGENCY MEDICINE

## 2023-02-06 PROCEDURE — 85610 PROTHROMBIN TIME: CPT | Performed by: EMERGENCY MEDICINE

## 2023-02-06 PROCEDURE — 84484 ASSAY OF TROPONIN QUANT: CPT | Performed by: EMERGENCY MEDICINE

## 2023-02-06 PROCEDURE — 999N000285 HC STATISTIC VASC ACCESS LAB DRAW WITH PIV START

## 2023-02-06 PROCEDURE — 83605 ASSAY OF LACTIC ACID: CPT | Performed by: EMERGENCY MEDICINE

## 2023-02-06 PROCEDURE — 85025 COMPLETE CBC W/AUTO DIFF WBC: CPT | Performed by: EMERGENCY MEDICINE

## 2023-02-06 PROCEDURE — 36415 COLL VENOUS BLD VENIPUNCTURE: CPT | Performed by: EMERGENCY MEDICINE

## 2023-02-06 PROCEDURE — 999N000127 HC STATISTIC PERIPHERAL IV START W US GUIDANCE

## 2023-02-06 PROCEDURE — 74177 CT ABD & PELVIS W/CONTRAST: CPT | Mod: 26 | Performed by: RADIOLOGY

## 2023-02-06 PROCEDURE — 82010 KETONE BODYS QUAN: CPT | Performed by: EMERGENCY MEDICINE

## 2023-02-06 PROCEDURE — 250N000013 HC RX MED GY IP 250 OP 250 PS 637: Performed by: EMERGENCY MEDICINE

## 2023-02-06 PROCEDURE — 99285 EMERGENCY DEPT VISIT HI MDM: CPT | Mod: 25 | Performed by: EMERGENCY MEDICINE

## 2023-02-06 PROCEDURE — 93005 ELECTROCARDIOGRAM TRACING: CPT | Performed by: EMERGENCY MEDICINE

## 2023-02-06 RX ORDER — IOPAMIDOL 755 MG/ML
86 INJECTION, SOLUTION INTRAVASCULAR ONCE
Status: COMPLETED | OUTPATIENT
Start: 2023-02-06 | End: 2023-02-06

## 2023-02-06 RX ADMIN — SODIUM CHLORIDE, PRESERVATIVE FREE 72 ML: 5 INJECTION INTRAVENOUS at 22:21

## 2023-02-06 RX ADMIN — IOPAMIDOL 86 ML: 755 INJECTION, SOLUTION INTRAVENOUS at 22:21

## 2023-02-06 RX ADMIN — SODIUM CHLORIDE 500 ML: 9 INJECTION, SOLUTION INTRAVENOUS at 20:54

## 2023-02-06 RX ADMIN — LIDOCAINE HYDROCHLORIDE 30 ML: 20 SOLUTION OROPHARYNGEAL at 21:34

## 2023-02-06 ASSESSMENT — ACTIVITIES OF DAILY LIVING (ADL)
ADLS_ACUITY_SCORE: 37
ADLS_ACUITY_SCORE: 37
ADLS_ACUITY_SCORE: 35

## 2023-02-06 NOTE — ED TRIAGE NOTES
Patient ambulatory to triage for abdominal pain and nausea. Patient states the pain started last night.

## 2023-02-07 ENCOUNTER — APPOINTMENT (OUTPATIENT)
Dept: GENERAL RADIOLOGY | Facility: CLINIC | Age: 58
End: 2023-02-07
Attending: EMERGENCY MEDICINE
Payer: MEDICAID

## 2023-02-07 ENCOUNTER — APPOINTMENT (OUTPATIENT)
Dept: GENERAL RADIOLOGY | Facility: CLINIC | Age: 58
End: 2023-02-07
Payer: MEDICAID

## 2023-02-07 VITALS
RESPIRATION RATE: 16 BRPM | DIASTOLIC BLOOD PRESSURE: 99 MMHG | TEMPERATURE: 98.6 F | HEIGHT: 66 IN | SYSTOLIC BLOOD PRESSURE: 144 MMHG | OXYGEN SATURATION: 100 % | BODY MASS INDEX: 24.11 KG/M2 | HEART RATE: 89 BPM | WEIGHT: 150 LBS

## 2023-02-07 PROBLEM — E87.6 HYPOKALEMIA: Status: RESOLVED | Noted: 2021-12-25 | Resolved: 2023-02-07

## 2023-02-07 PROBLEM — J18.9 PNEUMONIA: Status: RESOLVED | Noted: 2019-01-06 | Resolved: 2023-02-07

## 2023-02-07 PROBLEM — R10.84 ABDOMINAL PAIN, GENERALIZED: Status: RESOLVED | Noted: 2022-12-08 | Resolved: 2023-02-07

## 2023-02-07 PROBLEM — R10.9 ACUTE ABDOMINAL PAIN: Status: RESOLVED | Noted: 2021-12-24 | Resolved: 2023-02-07

## 2023-02-07 PROBLEM — U07.1 COVID-19: Status: RESOLVED | Noted: 2022-01-11 | Resolved: 2023-02-07

## 2023-02-07 PROBLEM — B20 HUMAN IMMUNODEFICIENCY VIRUS (HIV) DISEASE (H): Status: RESOLVED | Noted: 2022-01-22 | Resolved: 2023-02-07

## 2023-02-07 PROBLEM — H66.90 OTITIS MEDIA: Status: RESOLVED | Noted: 2018-03-07 | Resolved: 2023-02-07

## 2023-02-07 PROBLEM — R73.9 HYPERGLYCEMIA: Status: RESOLVED | Noted: 2019-11-11 | Resolved: 2023-02-07

## 2023-02-07 PROBLEM — R10.13 ABDOMINAL PAIN, EPIGASTRIC: Status: RESOLVED | Noted: 2022-06-06 | Resolved: 2023-02-07

## 2023-02-07 PROBLEM — M79.644 PAIN OF RIGHT THUMB: Status: RESOLVED | Noted: 2017-03-20 | Resolved: 2023-02-07

## 2023-02-07 PROBLEM — Z47.89 AFTERCARE FOLLOWING SURGERY OF THE MUSCULOSKELETAL SYSTEM: Status: RESOLVED | Noted: 2017-03-20 | Resolved: 2023-02-07

## 2023-02-07 PROBLEM — K56.0 ADYNAMIC ILEUS (H): Status: RESOLVED | Noted: 2018-09-10 | Resolved: 2023-02-07

## 2023-02-07 PROBLEM — K56.600 PARTIAL SMALL BOWEL OBSTRUCTION (H): Status: RESOLVED | Noted: 2022-05-25 | Resolved: 2023-02-07

## 2023-02-07 PROBLEM — K35.30 ACUTE APPENDICITIS WITH LOCALIZED PERITONITIS: Status: RESOLVED | Noted: 2018-01-31 | Resolved: 2023-02-07

## 2023-02-07 PROBLEM — U07.1 INFECTION DUE TO 2019 NOVEL CORONAVIRUS: Status: RESOLVED | Noted: 2022-12-23 | Resolved: 2023-02-07

## 2023-02-07 PROBLEM — R10.12 ABDOMINAL PAIN, LEFT UPPER QUADRANT: Status: RESOLVED | Noted: 2022-01-12 | Resolved: 2023-02-07

## 2023-02-07 PROBLEM — J02.0 STREPTOCOCCAL PHARYNGITIS: Status: RESOLVED | Noted: 2020-09-25 | Resolved: 2023-02-07

## 2023-02-07 PROBLEM — R10.9 ABDOMINAL PAIN: Status: RESOLVED | Noted: 2017-11-07 | Resolved: 2023-02-07

## 2023-02-07 PROBLEM — Z00.00 PREVENTATIVE HEALTH CARE: Status: RESOLVED | Noted: 2017-01-17 | Resolved: 2023-02-07

## 2023-02-07 PROBLEM — J02.0 STREP SORE THROAT: Status: RESOLVED | Noted: 2020-09-25 | Resolved: 2023-02-07

## 2023-02-07 PROBLEM — K56.600 PARTIAL INTESTINAL OBSTRUCTION, UNSPECIFIED CAUSE (H): Status: ACTIVE | Noted: 2022-12-23

## 2023-02-07 PROBLEM — K37 APPENDICITIS: Status: RESOLVED | Noted: 2018-02-11 | Resolved: 2023-02-07

## 2023-02-07 PROBLEM — L03.90 CELLULITIS: Status: RESOLVED | Noted: 2020-02-25 | Resolved: 2023-02-07

## 2023-02-07 LAB
ALBUMIN UR-MCNC: 30 MG/DL
APPEARANCE UR: CLEAR
ATRIAL RATE - MUSE: 98 BPM
BACTERIA #/AREA URNS HPF: ABNORMAL /HPF
BILIRUB UR QL STRIP: NEGATIVE
COLOR UR AUTO: ABNORMAL
DIASTOLIC BLOOD PRESSURE - MUSE: NORMAL MMHG
GLUCOSE BLDC GLUCOMTR-MCNC: 232 MG/DL (ref 70–99)
GLUCOSE BLDC GLUCOMTR-MCNC: 273 MG/DL (ref 70–99)
GLUCOSE BLDC GLUCOMTR-MCNC: 299 MG/DL (ref 70–99)
GLUCOSE BLDC GLUCOMTR-MCNC: 315 MG/DL (ref 70–99)
GLUCOSE UR STRIP-MCNC: >=1000 MG/DL
HGB UR QL STRIP: NEGATIVE
INTERPRETATION ECG - MUSE: NORMAL
KETONES UR STRIP-MCNC: 40 MG/DL
LACTATE SERPL-SCNC: 1.3 MMOL/L (ref 0.7–2)
LEUKOCYTE ESTERASE UR QL STRIP: NEGATIVE
MUCOUS THREADS #/AREA URNS LPF: PRESENT /LPF
NITRATE UR QL: NEGATIVE
P AXIS - MUSE: 50 DEGREES
PH UR STRIP: 7 [PH] (ref 5–7)
PR INTERVAL - MUSE: 120 MS
QRS DURATION - MUSE: 84 MS
QT - MUSE: 346 MS
QTC - MUSE: 441 MS
R AXIS - MUSE: -35 DEGREES
RBC URINE: 3 /HPF
SP GR UR STRIP: 1.01 (ref 1–1.03)
SYSTOLIC BLOOD PRESSURE - MUSE: NORMAL MMHG
T AXIS - MUSE: 3 DEGREES
UROBILINOGEN UR STRIP-MCNC: NORMAL MG/DL
VENTRICULAR RATE- MUSE: 98 BPM
WBC URINE: <1 /HPF

## 2023-02-07 PROCEDURE — 250N000011 HC RX IP 250 OP 636: Performed by: STUDENT IN AN ORGANIZED HEALTH CARE EDUCATION/TRAINING PROGRAM

## 2023-02-07 PROCEDURE — 999N000065 XR ABDOMEN PORT 1 VIEW

## 2023-02-07 PROCEDURE — 83605 ASSAY OF LACTIC ACID: CPT | Performed by: EMERGENCY MEDICINE

## 2023-02-07 PROCEDURE — 99283 EMERGENCY DEPT VISIT LOW MDM: CPT | Mod: GC | Performed by: SURGERY

## 2023-02-07 PROCEDURE — 81001 URINALYSIS AUTO W/SCOPE: CPT | Performed by: EMERGENCY MEDICINE

## 2023-02-07 PROCEDURE — 250N000011 HC RX IP 250 OP 636: Performed by: EMERGENCY MEDICINE

## 2023-02-07 PROCEDURE — 74018 RADEX ABDOMEN 1 VIEW: CPT | Mod: 26 | Performed by: RADIOLOGY

## 2023-02-07 PROCEDURE — 82962 GLUCOSE BLOOD TEST: CPT

## 2023-02-07 PROCEDURE — C9113 INJ PANTOPRAZOLE SODIUM, VIA: HCPCS | Performed by: STUDENT IN AN ORGANIZED HEALTH CARE EDUCATION/TRAINING PROGRAM

## 2023-02-07 PROCEDURE — 250N000012 HC RX MED GY IP 250 OP 636 PS 637: Performed by: STUDENT IN AN ORGANIZED HEALTH CARE EDUCATION/TRAINING PROGRAM

## 2023-02-07 PROCEDURE — 120N000002 HC R&B MED SURG/OB UMMC

## 2023-02-07 PROCEDURE — 74018 RADEX ABDOMEN 1 VIEW: CPT

## 2023-02-07 PROCEDURE — 258N000003 HC RX IP 258 OP 636: Performed by: STUDENT IN AN ORGANIZED HEALTH CARE EDUCATION/TRAINING PROGRAM

## 2023-02-07 PROCEDURE — 99222 1ST HOSP IP/OBS MODERATE 55: CPT | Mod: AI | Performed by: PEDIATRICS

## 2023-02-07 PROCEDURE — 250N000013 HC RX MED GY IP 250 OP 250 PS 637: Performed by: STUDENT IN AN ORGANIZED HEALTH CARE EDUCATION/TRAINING PROGRAM

## 2023-02-07 PROCEDURE — 36415 COLL VENOUS BLD VENIPUNCTURE: CPT | Performed by: EMERGENCY MEDICINE

## 2023-02-07 RX ORDER — SODIUM CHLORIDE, SODIUM LACTATE, POTASSIUM CHLORIDE, CALCIUM CHLORIDE 600; 310; 30; 20 MG/100ML; MG/100ML; MG/100ML; MG/100ML
INJECTION, SOLUTION INTRAVENOUS CONTINUOUS
Status: DISCONTINUED | OUTPATIENT
Start: 2023-02-07 | End: 2023-02-08 | Stop reason: HOSPADM

## 2023-02-07 RX ORDER — ACETAMINOPHEN 325 MG/10.15ML
650 LIQUID ORAL EVERY 6 HOURS PRN
Status: DISCONTINUED | OUTPATIENT
Start: 2023-02-07 | End: 2023-02-08 | Stop reason: HOSPADM

## 2023-02-07 RX ORDER — LIDOCAINE 40 MG/G
CREAM TOPICAL
Status: DISCONTINUED | OUTPATIENT
Start: 2023-02-07 | End: 2023-02-08 | Stop reason: HOSPADM

## 2023-02-07 RX ORDER — NICOTINE POLACRILEX 4 MG
15-30 LOZENGE BUCCAL
Status: DISCONTINUED | OUTPATIENT
Start: 2023-02-07 | End: 2023-02-08 | Stop reason: HOSPADM

## 2023-02-07 RX ORDER — METFORMIN HYDROCHLORIDE 750 MG/1
1500 TABLET, EXTENDED RELEASE ORAL
Status: CANCELLED | OUTPATIENT
Start: 2023-02-07

## 2023-02-07 RX ORDER — HYDROMORPHONE HCL IN WATER/PF 6 MG/30 ML
0.2 PATIENT CONTROLLED ANALGESIA SYRINGE INTRAVENOUS ONCE
Status: COMPLETED | OUTPATIENT
Start: 2023-02-07 | End: 2023-02-07

## 2023-02-07 RX ORDER — FAMOTIDINE 20 MG/1
40 TABLET, FILM COATED ORAL DAILY
Status: DISCONTINUED | OUTPATIENT
Start: 2023-02-07 | End: 2023-02-07

## 2023-02-07 RX ORDER — DEXTROSE MONOHYDRATE 25 G/50ML
25-50 INJECTION, SOLUTION INTRAVENOUS
Status: DISCONTINUED | OUTPATIENT
Start: 2023-02-07 | End: 2023-02-08 | Stop reason: HOSPADM

## 2023-02-07 RX ORDER — KETOROLAC TROMETHAMINE 30 MG/ML
30 INJECTION, SOLUTION INTRAMUSCULAR; INTRAVENOUS EVERY 6 HOURS PRN
Status: DISCONTINUED | OUTPATIENT
Start: 2023-02-07 | End: 2023-02-08 | Stop reason: HOSPADM

## 2023-02-07 RX ORDER — ONDANSETRON 4 MG/1
4 TABLET, ORALLY DISINTEGRATING ORAL EVERY 6 HOURS PRN
Status: DISCONTINUED | OUTPATIENT
Start: 2023-02-07 | End: 2023-02-08 | Stop reason: HOSPADM

## 2023-02-07 RX ORDER — AMOXICILLIN 250 MG
1 CAPSULE ORAL 2 TIMES DAILY PRN
Status: DISCONTINUED | OUTPATIENT
Start: 2023-02-07 | End: 2023-02-08 | Stop reason: HOSPADM

## 2023-02-07 RX ORDER — ONDANSETRON 2 MG/ML
4 INJECTION INTRAMUSCULAR; INTRAVENOUS EVERY 6 HOURS PRN
Status: DISCONTINUED | OUTPATIENT
Start: 2023-02-07 | End: 2023-02-08 | Stop reason: HOSPADM

## 2023-02-07 RX ORDER — AMOXICILLIN 250 MG
2 CAPSULE ORAL 2 TIMES DAILY PRN
Status: DISCONTINUED | OUTPATIENT
Start: 2023-02-07 | End: 2023-02-08 | Stop reason: HOSPADM

## 2023-02-07 RX ORDER — HYDROMORPHONE HYDROCHLORIDE 2 MG/1
2 TABLET ORAL ONCE
Status: COMPLETED | OUTPATIENT
Start: 2023-02-07 | End: 2023-02-07

## 2023-02-07 RX ADMIN — INSULIN ASPART 3 UNITS: 100 INJECTION, SOLUTION INTRAVENOUS; SUBCUTANEOUS at 09:29

## 2023-02-07 RX ADMIN — HYDROMORPHONE HYDROCHLORIDE 0.2 MG: 0.2 INJECTION, SOLUTION INTRAMUSCULAR; INTRAVENOUS; SUBCUTANEOUS at 01:26

## 2023-02-07 RX ADMIN — INSULIN ASPART 4 UNITS: 100 INJECTION, SOLUTION INTRAVENOUS; SUBCUTANEOUS at 12:06

## 2023-02-07 RX ADMIN — KETOROLAC TROMETHAMINE 30 MG: 30 INJECTION, SOLUTION INTRAMUSCULAR; INTRAVENOUS at 10:48

## 2023-02-07 RX ADMIN — INSULIN ASPART 4 UNITS: 100 INJECTION, SOLUTION INTRAVENOUS; SUBCUTANEOUS at 19:59

## 2023-02-07 RX ADMIN — SODIUM CHLORIDE, POTASSIUM CHLORIDE, SODIUM LACTATE AND CALCIUM CHLORIDE: 600; 310; 30; 20 INJECTION, SOLUTION INTRAVENOUS at 16:31

## 2023-02-07 RX ADMIN — DIATRIZOATE MEGLUMINE AND DIATRIZOATE SODIUM 75 ML: 660; 100 SOLUTION ORAL; RECTAL at 08:28

## 2023-02-07 RX ADMIN — KETOROLAC TROMETHAMINE 30 MG: 30 INJECTION, SOLUTION INTRAMUSCULAR; INTRAVENOUS at 20:17

## 2023-02-07 RX ADMIN — INSULIN ASPART 2 UNITS: 100 INJECTION, SOLUTION INTRAVENOUS; SUBCUTANEOUS at 17:23

## 2023-02-07 RX ADMIN — SODIUM CHLORIDE, POTASSIUM CHLORIDE, SODIUM LACTATE AND CALCIUM CHLORIDE: 600; 310; 30; 20 INJECTION, SOLUTION INTRAVENOUS at 07:48

## 2023-02-07 RX ADMIN — PANTOPRAZOLE SODIUM 40 MG: 40 INJECTION, POWDER, FOR SOLUTION INTRAVENOUS at 11:14

## 2023-02-07 RX ADMIN — FAMOTIDINE 40 MG: 20 TABLET, FILM COATED ORAL at 07:47

## 2023-02-07 RX ADMIN — BICTEGRAVIR SODIUM, EMTRICITABINE, AND TENOFOVIR ALAFENAMIDE FUMARATE 1 TABLET: 50; 200; 25 TABLET ORAL at 09:30

## 2023-02-07 RX ADMIN — HYDROMORPHONE HYDROCHLORIDE 2 MG: 2 TABLET ORAL at 07:23

## 2023-02-07 RX ADMIN — PANTOPRAZOLE SODIUM 40 MG: 40 INJECTION, POWDER, FOR SOLUTION INTRAVENOUS at 20:03

## 2023-02-07 ASSESSMENT — ACTIVITIES OF DAILY LIVING (ADL)
ADLS_ACUITY_SCORE: 37

## 2023-02-07 NOTE — ED NOTES
Ngt advanced per radiology exam. Patient toelrated well.  Order received from Dr. Trammell to connect the NGT to low intermittent suction.

## 2023-02-07 NOTE — H&P
[facility]    History and Physical - Medicine Service, [facility] TEAM        Date of Admission:  2/6/2023    Assessment & Plan      Andrew Lockwood is a 57 year old male with history of housing insecurity, HIV on Biktarvy, prior recurrent SBOs, uncontrolled DM II, HLD, GERD, chronic abdominal pain, prior CNS toxoplasmosis, history of substance abuse, PTSD presenting with recurrent partial small bowel obstruction. HDS and planning for trial of medical management.     Partial SBO  History of recurrent partial SBO all managed conservatively. Similar symptoms as prior, no BM/flatus x3 days. CT AP with partial obstruction with dilated jejunal loop in RLQ, no bowel wall thickening. Hx of appendectomy ~3 yrs ago but states had problems with recurrent SBOs even prior to that.   - Surgery consulted, no plan for surgical intervention (partial SBO and housing insecurity to patient objects), will continue to follow   - NGT was placed in ER however, patient removed promptly as was uncomfortable. Patient adamantly declines replacement currently, but stated if he had recurrent vomiting, we would rediscuss recommendation  - NPO   - mIVF   - BMP daily + PRN lyte repletion     Hyperglycemia   DM II Hgb A1c 12.8% (12/2022)  Pseudohyponatremia - Corrected Na 138  Reports not taking insulin as he believes it causes his bowel obstructions. Does take his metformin. No anion gap or acidosis; mild lactate elevation that improved with 500 mL IVF. Was scheduled with [facility] to discuss alternative options 2/1, however, missed appt as presented to ER.   - Hold PTA metformin  - MD sliding scale insulin, q4h FSBG, hypoglycemia protocol  - consider scheduling PCP appt vs [facility]     CHRONIC & STABLE PROBLEMS  HIV-  Continue PTA biktarvy (last saw ID 1/2023), needs to f/u in April 2023  LSIL - needs f/u   GERD - continue PTA famotidine     Diet:   NPO  DVT Prophylaxis: Ambulate every  shift  Potts Catheter: Not present  Fluids: mIVF with LR @ 125 mL/hr  Lines: None     Cardiac Monitoring: None  Code Status:   FULL    Disposition Plan [facility]ivo once tolerating PO     Patient to be formally staffed in the AM.     Felisha Alberto MD  Medicine Service, [facility]  Securely message with Meet[employer] (more info)  Text page via [employer] Paging/Directory   See signed in provider for up to date coverage information  ______________________________________________________________________    Chief Complaint   Abdominal pain    History is obtained from the patient    History of Present Illness   Andrew Collier is a 57 year old male who states that he thinks he has a bowel obstruction. His main complaint is pain. He notes abdominal distention and abdominal pain worsening over past 24 hours.  He complains of no BM or flatus for past 3 days and nausea with non-bloody vomiting starting ~24 hours ago; he has vomited multiple times in past 24 hours.  No fevers, CP, SOB, cough, dysuria, rashes, sick contacts, ingestions, alcohol.    Patient states that he feels much better since he vomited in ER and that the NGT made him gag so he removed it. He is asking for a popsicle.     He states he takes 3 medications - biktarvy, famotidine, and metformin. He is living at [facility].     Past Medical History    Past Medical History:   Diagnosis Date     Acute appendicitis with localized peritonitis 1/31/2018     AIDS (H)      Allergic rhinitis due to other allergen     DNS     Anal dysplasia      Chronic abdominal pain      CNS toxoplasmosis (H)      COVID-19 1/11/2022     Diabetes type 2, controlled (H)      GERD (gastroesophageal reflux disease)      Herpes zoster 9/23/2016     History of seizure      History of substance abuse (H)      HIV (human immunodeficiency virus infection) (H)      HLD (hyperlipidemia)      Lung nodules      Periungual wart      Pneumonia 1/6/2019      PTSD (post-traumatic stress disorder)      Sleep apnea     doesn't use CPAP       Past Surgical History   Past Surgical History:   Procedure Laterality Date     ANOSCOPY N/A 9/9/2020    Procedure: Exam Under Anesthesia, ANOSCOPY, fulgeration of rectal fissures with Rectal Biopsies;  Surgeon: Thanh Lundberg MD;  Location: [facility] OR     COLONOSCOPY Left 1/22/2016    Procedure: COMBINED COLONOSCOPY, SINGLE OR MULTIPLE BIOPSY/POLYPECTOMY BY BIOPSY;  Surgeon: Clark Saini MD;  Location: [facility] GI     ESOPHAGOSCOPY, GASTROSCOPY, DUODENOSCOPY (EGD), COMBINED N/A 6/6/2022    Procedure: ESOPHAGOGASTRODUODENOSCOPY, WITH BIOPSY;  Surgeon: Kecia Benítez MD;  Location: [facility] GI     HC EXPLORE UNDESC TESTIS,INGUIN/SCROTAL       LAPAROSCOPIC APPENDECTOMY N/A 1/31/2018    Procedure: LAPAROSCOPIC APPENDECTOMY;  LAPAROSCOPIC APPENDECTOMY;  Surgeon: Dawn Holt MD;  Location: [facility] OR     LAPAROSCOPY DIAGNOSTIC (GENERAL) N/A 7/26/2016    Procedure: LAPAROSCOPY DIAGNOSTIC (GENERAL);  Surgeon: Susannah Arriaga MD;  Location: [facility]     LAPAROSCOPY DIAGNOSTIC (GENERAL) N/A 4/16/2018    Procedure: LAPAROSCOPY DIAGNOSTIC (GENERAL);  Diagnostic laparoscopy and lysis of adhesions;  Surgeon: Prince Dowling MD;  Location: [facility] OR     OPTICAL TRACKING SYSTEM CRANIOTOMY, EXCISE TUMOR, COMBINED Left 4/10/2015    Procedure: COMBINED OPTICAL TRACKING SYSTEM CRANIOTOMY, EXCISE TUMOR;  Surgeon: Mirlande Colmenares MD;  Location: [facility] OR     REPAIR GAMEKEEPER'S THUMB Right 12/2/2016    Procedure: REPAIR LIGAMENT ULNAR COLLATERAL THUMB (GAMEKEEPER'S);  Surgeon: Evin Zamorano MD;  Location: [facility] OR     ZZC NONSPECIFIC PROCEDURE      right forearm fracture       Prior to Admission Medications   Prior to Admission Medications   Prescriptions Last Dose Informant Patient Reported? Taking?   bictegravir-emtricitabine-tenofovir (BIKTARVY) -25 MG per tablet   No No   Sig: Take 1 tablet by mouth daily   blood glucose (NO BRAND  SPECIFIED) lancets standard   No No   Sig: Use to test blood sugar 1 time daily or as directed.   blood glucose (NO BRAND SPECIFIED) test strip   No No   Sig: Use to test blood sugar 1 time daily or as directed.   blood glucose monitoring (NO BRAND SPECIFIED) meter device kit   No No   Sig: Use to test blood sugar 1 time daily or as directed.   famotidine (PEPCID) 40 MG tablet   No No   Sig: Take 1 tablet (40 mg) by mouth daily   metFORMIN (GLUCOPHAGE XR) 500 MG 24 hr tablet   No No   Sig: Take 3 tablets (1,500 mg) by mouth daily (with dinner) With food      Facility-Administered Medications: None        Review of Systems    The 10 point Review of Systems is negative other than noted in the HPI or here.     Social History   I have reviewed this patient's social history and updated it with pertinent information if needed.  Social History     Tobacco Use     Smoking status: Some Days     Packs/day: 0.25     Years: 40.00     Pack years: 10.00     Types: Cigarettes     Smokeless tobacco: Former   Substance Use Topics     Alcohol use: No     Alcohol/week: 0.0 standard drinks     Comment: Last etoh in 2007     Drug use: Not Currently     Types: Marijuana, Methamphetamines       Family History   I have reviewed this patient's family history and updated it with pertinent information if needed.  Family History   Problem Relation Age of Onset     Diabetes Brother      Diabetes Father      Alzheimer Disease Father      Unknown/Adopted Mother      Diabetes Paternal Grandfather      Cancer No family hx of         no skin cancer     Skin Cancer No family hx of         no famiy hx of skin cancer     Glaucoma No family hx of      Macular Degeneration No family hx of        Allergies   Allergies   Allergen Reactions     Fentanyl Blisters     Per pt, after taking this medication had blisters develope     Tylenol [Acetaminophen] Itching     Dulaglutide Rash     Insulin Lispro Rash     Patient reported     Penicillin V Other (See  "Comments) and Rash     Diffuse maculopapular rash + feels \"high\", per pt.         Physical Exam   Vital Signs: Temp: 98.4  F (36.9  C) Temp src: Oral BP: (!) 156/108 Pulse: 99   Resp: 16 SpO2: 98 % O2 Device: None (Room air)    Weight: 150 lbs 0 oz    Constitutional: well appearing male, alert and no distress, appears stated age  HEENT: Normocephalic. MMM  Cardiovascular: RRR. No murmurs, clicks gallops or rubs  Respiratory: Good diaphragmatic excursion. Lungs clear bilaterally, non-labored conversational breating  Gastrointestinal: Abdomen distended, slightly firm, mildly tender throughout (better he says since vomiting). BS hypoactive. NGT on floor with brown fluid and small amount of fluid contents (<10 mL) in cannister  Musculoskeletal: extremities normal  Skin: no suspicious lesions or rashes, normal skin turgor  Neurologic: AxO x3. Moving all extremities bilaterally. Strength globally wnl.    Psychiatric: mentation appears normal       Data     I have personally reviewed the following data over the past 24 hrs:    10.4  \   16.1   / 404     134 (L) 92 (L) 17.2 /  330 (H)   4.6 27 0.80 \       ALT: 18 AST: 24 AP: 110 TBILI: 1.2   ALB: 4.4 TOT PROTEIN: 8.1 LIPASE: 61 (H)       Trop: 9 BNP: N/A       Procal: N/A CRP: N/A Lactic Acid: 1.3       INR:  0.98 PTT:  N/A   D-dimer:  N/A Fibrinogen:  N/A       Imaging results reviewed over the past 24 hrs:   Recent Results (from the past 24 hour(s))   CT Chest/Abdomen/Pelvis w Contrast    Narrative    EXAM: CT CHEST/ABDOMEN/PELVIS W CONTRAST  LOCATION: [facility]  DATE/TIME: 2/6/2023 11:00 PM    INDICATION: Chest and abdominal pain.  COMPARISON: CT abdomen/pelvis 01/24/2023, CT chest 07/08/2020  TECHNIQUE: CT scan of the chest, abdomen, and pelvis was performed following injection of IV contrast. Multiplanar reformats were obtained. Dose reduction techniques were used.   CONTRAST: 86 ml isovue 370     FINDINGS:   LUNGS AND " PLEURA: Unchanged 0.7 cm nodular opacity in the right middle lobe (series 8, image 159), which has a flattened appearance on sagittal and coronal imaging and is unchanged since 2020, benign; no follow-up indicated. No focal airspace disease. No   pleural effusion or pneumothorax.    MEDIASTINUM/AXILLAE: No lymphadenopathy. No pericardial effusion.    CORONARY ARTERY CALCIFICATION: Moderate.    HEPATOBILIARY: Liver appears normal. Tiny gallstone in the gallbladder. No gallbladder wall thickening or pericholecystic fluid. No biliary dilatation.    PANCREAS: Normal.    SPLEEN: Normal.    ADRENAL GLANDS: Normal.    KIDNEYS/BLADDER: Kidneys appear normal. No hydronephrosis. Unchanged mild concentric wall thickening of the urinary bladder.    BOWEL: Proximal small bowel is normal caliber. Dilated loop of jejunum in the mid abdomen measuring up to 4.7 cm in diameter. There is transition to normal caliber distal small bowel in the right lower quadrant (series 7, image 35). Colon appears normal.   No bowel bowel wall thickening or evidence of inflammation. No pneumatosis.    LYMPH NODES: No lymphadenopathy.    VASCULATURE: Mild atherosclerotic calcifications.    PELVIC ORGANS: Enlarged prostate. Penile implant with reservoir in the left hemipelvis.    MUSCULOSKELETAL: Unremarkable.      Impression    IMPRESSION:  1.  Dilated jejunal loop with transition to normal caliber small bowel in the right lower quadrant, suspicious for partial small bowel obstruction, possibly due to adhesion or stricture. No pneumatosis.    2.  No acute findings in the chest.   XR Abdomen Port 1 View    Narrative    EXAM: XR ABDOMEN PORT 1 VIEW, XR ABDOMEN PORT 1 VIEW  LOCATION: [facility]  DATE/TIME: 2/7/2023 2:10 AM    INDICATION: NGT  VERIFICATION  COMPARISON: None.      Impression    IMPRESSION: 2 images of the abdomen. The initial image demonstrates the NG tube tip at the GE junction. On the final  image, the tube has been advanced into the body of the stomach.   XR Abdomen Port 1 View    Narrative    EXAM: XR ABDOMEN PORT 1 VIEW, XR ABDOMEN PORT 1 VIEW  LOCATION: [facility]  DATE/TIME: 2/7/2023 2:10 AM    INDICATION: NGT  VERIFICATION  COMPARISON: None.      Impression    IMPRESSION: 2 images of the abdomen. The initial image demonstrates the NG tube tip at the GE junction. On the final image, the tube has been advanced into the body of the stomach.

## 2023-02-07 NOTE — ED PROVIDER NOTES
ED Provider Note  [facility]      History     Chief Complaint   Patient presents with     Abdominal Pain     HPI  Andrew Collier is a 57 year old male who has a PMH notable for HIV, prior SBOs and adynamic ileus, multiple abdominal procedures (diagnostic laparoscopies, appendectomy, EGD, colonoscopy, et al.), DM2, HLD, GERD, chronic abdominal pain, prior toxoplasmosis, prior COVID-19 diagnosis (received Paxlovid), hx substance abuse, PTSD, et al., who was admitted at end of December w/ partial SBO, and is presenting today with concern of abdominal pain.  Symptoms been present for the last day, 1 day history of abdominal pain.      He points to the epigastric area for location of his discomfort, says it radiates somewhat up towards his chest.  With this not having as much stooling, and when he did it was a bit darker than usual, no prachi black tarry stools or blood appearance.  Denies any nausea or vomiting.  No urinary or  symptoms.  Denies any fevers or chills.  Outside of the abdominal pain that radiates up to the chest he denies any chest pain.  No breathing symptoms, cough, etc.  Denies any headaches or HEENT symptoms.  No neck pain or stiffness, no back pain or any symptoms between the shoulder blades.  No extremity symptoms or changes.  No rashes or skin changes.  Says this feels more like when he was in the ED last as opposed to when he was admitted in December for bowel obstruction but says sometimes it feels similar either way.  Otherwise denies any recent substance use, no SI/HI or self-harm behaviors.  Nothing seems to make it better or worse.  Has had something like this previously, but again just started within the last 24 hours again.  Because of pain been a bit harder to take his usual metformin medications.  Otherwise no other new symptoms or complaints at this time.  Full ROS completed w/o any additional positives identified.     This part of the medical record  was transcribed by Dru Cuevas, Medical Scribe, from a dictation done by Ceci Trammell MD.         Past Medical History  Past Medical History:   Diagnosis Date     AIDS (H)      Allergic rhinitis due to other allergen     DNS     Anal dysplasia      Chronic abdominal pain      CNS toxoplasmosis (H)      Diabetes type 2, controlled (H)      GERD (gastroesophageal reflux disease)      History of seizure      History of substance abuse (H)      HIV (human immunodeficiency virus infection) (H)      HLD (hyperlipidemia)      Lung nodules      Periungual wart      PTSD (post-traumatic stress disorder)      Sleep apnea     doesn't use CPAP     Past Surgical History:   Procedure Laterality Date     ANOSCOPY N/A 9/9/2020    Procedure: Exam Under Anesthesia, ANOSCOPY, fulgeration of rectal fissures with Rectal Biopsies;  Surgeon: Thanh Lundberg MD;  Location: [facility] OR     COLONOSCOPY Left 1/22/2016    Procedure: COMBINED COLONOSCOPY, SINGLE OR MULTIPLE BIOPSY/POLYPECTOMY BY BIOPSY;  Surgeon: Clark Saini MD;  Location: [facility] GI     ESOPHAGOSCOPY, GASTROSCOPY, DUODENOSCOPY (EGD), COMBINED N/A 6/6/2022    Procedure: ESOPHAGOGASTRODUODENOSCOPY, WITH BIOPSY;  Surgeon: Kecia Benítez MD;  Location: [facility] GI     HC EXPLORE UNDESC TESTIS,INGUIN/SCROTAL       LAPAROSCOPIC APPENDECTOMY N/A 1/31/2018    Procedure: LAPAROSCOPIC APPENDECTOMY;  LAPAROSCOPIC APPENDECTOMY;  Surgeon: Dawn Holt MD;  Location: [facility] OR     LAPAROSCOPY DIAGNOSTIC (GENERAL) N/A 7/26/2016    Procedure: LAPAROSCOPY DIAGNOSTIC (GENERAL);  Surgeon: Susannah Arriaga MD;  Location: [facility]     LAPAROSCOPY DIAGNOSTIC (GENERAL) N/A 4/16/2018    Procedure: LAPAROSCOPY DIAGNOSTIC (GENERAL);  Diagnostic laparoscopy and lysis of adhesions;  Surgeon: Prince Dowling MD;  Location: [facility] OR     OPTICAL TRACKING SYSTEM CRANIOTOMY, EXCISE TUMOR, COMBINED Left 4/10/2015    Procedure: COMBINED OPTICAL TRACKING SYSTEM CRANIOTOMY, EXCISE TUMOR;  Surgeon:  "Mirlande Colmenares MD;  Location: [facility] OR     REPAIR GAMEKEEPER'S THUMB Right 12/2/2016    Procedure: REPAIR LIGAMENT ULNAR COLLATERAL THUMB (GAMEKEEPER'S);  Surgeon: Evin Zamorano MD;  Location:  OR     [facility] NONSPECIFIC PROCEDURE      right forearm fracture     bictegravir-emtricitabine-tenofovir (BIKTARVY) -25 MG per tablet  blood glucose (NO BRAND SPECIFIED) lancets standard  blood glucose (NO BRAND SPECIFIED) test strip  blood glucose monitoring (NO BRAND SPECIFIED) meter device kit  famotidine (PEPCID) 40 MG tablet  metFORMIN (GLUCOPHAGE XR) 500 MG 24 hr tablet      Allergies   Allergen Reactions     Fentanyl Blisters     Per pt, after taking this medication had blisters develope     Tylenol [Acetaminophen] Itching     Dulaglutide Rash     Insulin Lispro Rash     Patient reported     Penicillin V Other (See Comments) and Rash     Diffuse maculopapular rash + feels \"high\", per pt.      Family History  Family History   Problem Relation Age of Onset     Diabetes Brother      Diabetes Father      Alzheimer Disease Father      Unknown/Adopted Mother      Diabetes Paternal Grandfather      Cancer No family hx of         no skin cancer     Skin Cancer No family hx of         no famiy hx of skin cancer     Glaucoma No family hx of      Macular Degeneration No family hx of      Social History   Social History     Tobacco Use     Smoking status: Some Days     Packs/day: 0.25     Years: 40.00     Pack years: 10.00     Types: Cigarettes     Smokeless tobacco: Former   Substance Use Topics     Alcohol use: No     Alcohol/week: 0.0 standard drinks     Comment: Last etoh in 2007     Drug use: Not Currently     Types: Marijuana, Methamphetamines         A medically appropriate review of systems was performed with pertinent positives and negatives noted in the HPI, and all other systems negative.    Physical Exam   BP: 134/88  Pulse: 64  Temp: 98.4  F (36.9  C)  Resp: 16  SpO2: 98 %  Physical Exam "     CONSTITUTIONAL: Well-developed and well-nourished. Awake and alert. Non-toxic appearance. No acute distress.   HENT:   - Head: Normocephalic and atraumatic.   - Ears: External ear grossly normal.   - Nose: Nose normal. No rhinorrhea. No epistaxis.   - Mouth/Throat: MMM  EYES: Conjunctivae and lids are normal. No scleral icterus.   NECK: Normal range of motion and phonation normal. Neck supple.  No tracheal deviation, no stridor. No edema or erythema noted.  CARDIOVASCULAR: Normal rate, regular rhythm and no appreciable abnormal heart sounds.  PULMONARY/CHEST: Normal work of breathing. No accessory muscle usage or stridor. No respiratory distress.  No appreciable abnormal breath sounds.  ABDOMEN: Does have discomfort to palpation in the epigastric region but without prachi peritoneal findings. No palpable masses or abnormal pulsatility appreciated.   MUSCULOSKELETAL: Extremities warm and seemingly well perfused. No edema or calf tenderness.  NEUROLOGIC: Awake, alert. Not disoriented. No seizure activity. GCS 15  SKIN: Skin is warm and dry. No rash noted. No diaphoresis. No pallor.   PSYCHIATRIC: Normal mood and affect. Speech and behavior normal. Thought processes linear. Cognition and memory are normal. No SI/HI reported.      ED Course, Procedures, & Data     ED Course as of 02/07/23 1850 Tue Feb 07, 2023 0122 Surgery saw and evaluated the patient.  Based on the initial lactate, they did offer him operative intervention and reviewed the R/P/A, but after this discussion patient declined any surgical intervention at this time.  However, he did agree to NGT.  Discussed with ER RN, will get additional pain management, they will place, get confirmatory x-ray, etc.  Admitting team updated.   0216 RN placed NGT, got return of gastric contents on aspiration after xr            EKG Interpretation:      Interpreted by Ceci Trammell  Time reviewed: 19:32  Symptoms at time of EKG: Abdominal pain   Rhythm: normal sinus    Rate: 98 bpm  Axis: Left Axis Deviation  Ectopy: none  Conduction: normal  ST Segments/ T Waves: No ST-T elevation/depression, possible lateral infarct, age undetermined  Clinical Impression: Sinus, LAD, generally similar to previous      Medical Decision Making  The patient's presentation is strongly suggestive of an acute illness with systemic symptoms and an acute health issue posing potential threat to life or bodily function.    The patient's evaluation involved:  review of external note(s) from 3+ sources (see separate area of note for details)  ordering and/or review of 3+ test(s) in this encounter (see separate area of note for details)  review of 3+ test result(s) ordered prior to this encounter (see separate area of note for details)  independent interpretation of testing performed by another health professional (see separate area of note for details)    The patient's management involved prescription drug management (including medications given in the ED), limitations due to social determinants of health (see separate area of note for details), a parenteral controlled substance, a decision regarding emergency major surgery, a decision regarding hospitalization and further care after sign-out to IM and overnight EM physician (see their note for further management).      Assessment & Plan    IMPRESSION:   Andrew Collier is a 57 year old male who has a PMH notable for HIV, prior SBOs and adynamic ileus, multiple abdominal procedures (diagnostic laparoscopies, appendectomy, EGD, colonoscopy, et al.), DM2, HLD, GERD, chronic abdominal pain, prior toxoplasmosis, prior COVID-19 diagnosis (received Paxlovid), hx substance abuse, PTSD, et al., who was admitted at end of December w/ partial SBO, and is presenting today with concern of abdominal pain.  Symptoms been present for the last day, 1 day history of abdominal pain.       Clinically, patient peers nontoxic, NAD.  Vitals grossly WNL.  Otherwise on  "examination, does have discomfort with palpation in the epigastric area no prachi peritoneal findings or other acute findings on exam.     DDx includes, but not limited to, gastritis/PUD, pancreatitis, recurrent partial bowel obstruction, enteritis, colitis, hepatobiliary cause, etc., think less likely ACS or other cardiac or thoracic cause, especially given how reproducible it is on exam in the abdomen area, also consider chronic flare of his chronic abdominal pain, DM issues/DKA, also considered aortic etiology, amongst others.    PLAN:   - Screening ECG, laboratory studies, urine studies, chest/abdomen imaging, symptom management as needed, disposition pending ED course  - Risks/benefits of pursuing imaging reviewed and accepted.     RESULTS:  Labs:   - Initial lactate 2.7, glucose elevated in his usual range (usually 200-400's), BHB pending, troponin negative, no leukocytosis, hgb up from previous  Urine: No sample yet provided  Imaging: Written preliminary reports reviewed:  - CT Chest/A/P: \"1.  Dilated jejunal loop with transition to normal caliber small bowel in the right lower quadrant, suspicious for partial small bowel obstruction, possibly due to adhesion or stricture. No pneumatosis.  2.  No acute findings in the chest.\"  --- AXR: Reviewed images myself and confirmed w/ Radiology, appropriate positioning of NGT after advancement. See EMR for details.   Results/reports reviewed w/ patient who expresses understanding of findings and F/U recommendations.    INTERVENTIONS:   - IVF  - IV dilaudid  - Initially held off on insulin during workup given NPO and did not want to drop glucose too low, will likely add on drip pending ED course  - Surgery consult  - NGT    RE-EVALUATION:  - The patient's symptoms were improved w/ the IV dilaudid  - Repeat lactate improved after IVF  - Pt otherwise continues to do well here in the ED, no acute issues or apparent concerning changes in vitals or clinical " appearance.    DISCUSSIONS:  - w/ Surgery: They have seen and evaluated the patient here in the ED. Patient declining surgery, but did agree to NGT. They will continue to follow.    - w/ IM: Reviewed patient/presentation, current state of workup/any pending studies. They will admit for further evaluation/management, F/U pending studies as needed, coordinate w/ consulting services as needed. No additional requests/recommendations for workup/management for in the ED at this time.  - w/ Patient: I have reviewed the available findings, tentative plan with the patient. He expressed understanding and agreement with this plan. All questions answered to the best of our ability at this time.     DISPOSITION/PLANNING:  IMPRESSION:   - Recurrent partial small bowel obstruction  - Abdominal pain  - Lactic acid elevation (resolved s/p IVF)  - Elevated glucose and BHB, but now NPO  DISPOSITION:  - Admit to IM for further evaluation/management  OTHER RECOMMENDATIONS:   - Ongoing Surgical consult  - NGT placement   - Insulin/glucose management (poorly controlled, recommend acute and management for ongoing control)      ______________________________________________________________________      I, Dru Cuevas, am serving as a trained medical scribe to document services personally performed by Ceci Trammell MD, based on the provider's statements to me.     I, Ceci Trammell MD, was physically present and have reviewed and verified the accuracy of this note documented by Dru Cuevas.        Ceci Trammell MD  [facility] EMERGENCY DEPARTMENT  2/6/2023     Ceci Trammell MD  02/07/23 1941

## 2023-02-07 NOTE — CONSULTS
[facility]    Consult Note - General Surgery Service  Date of Admission:  2/6/2023   Consult Requested by: ED / Medicine  Reason for Consult: pSBO    Assessment & Plan: Surgery      Andrew Collier is a 57 year old housing insecure male w/ PMH including HIV, HLD, Seizures, substance use disorder, GERD, and DM2 and recurrent adhesive SBO presenting 2/6 w/ acute on chronic abdominal pain c/f recurrent SBO.     Still having significant pain. Lactate washed out with IVF and is now 1.3. Abdomen exam certainly TTP and distended. Patient initially reluctant for NG tube decompression but did eventually accept. Is very opposed to any surgical intervention stating he is concerned about post-operative care as he is housing insecure. Patient is to be admitted to medicine, will continue to monitor overnight w/ serial exams, NG tube to LIS. Continue IVF/NPO. Surgery to follow peripherally.     - No acute surgical indications  - Admit to medicine for SBO   - NGT to LIS  - NPO w/ IVF  - Serial exams overnight.   - Rest of cares per primary       Drains:       - May have additional drains, review Avatar  Code Status:   Full    Clinically Significant Risk Factors Present on Admission                      # DMII: A1C = 12.8 % (Ref range: <5.7 %) within past 3 months          The patient's care was discussed with the Attending Physician, Dr. Obrien and Chief Resident/Fellow.    Evin Hernandez MD  [facility]  Non-urgent messages: Securely message with [employer] (more info)  Text page via [employer] Paging/Directory     ______________________________________________________________________    Chief Complaint   Abdominal pain, constipation    History is obtained from the patient and electronic health record    History of Present Illness   Andrew Collier is a 57 year old housing insecure male w/ PMH including HIV, HLD, Seizures,  substance use disorder, GERD, and DM2 and recurrent adhesive SBO presenting 2/6 w/ acute on chronic abdominal pain c/f recurrent SBO. Of note, patient w/ over 12 ED visits in the past 2 months for this issue and frequently refuses NG tube decompression as well as any surgical intervention. The latter he states is secondary to his     This current episode began this morning with significant abdominal pain and constipation prompting the patient to present to the ED for pain management. ED evaluation at that time notable for partial SBO similar to previous imaging, however patient did have elevated lactic acidosis 2.7. Patient noting significant abdominal pain, but no F/C/S, CP, or SOB. No N/V/D, no urinary frequency, urgency, dysuria. No other acute questions or concerns.        Past Medical History    Past Medical History:   Diagnosis Date     AIDS (H)      Allergic rhinitis due to other allergen     DNS     Anal dysplasia      Chronic abdominal pain      CNS toxoplasmosis (H)      Diabetes type 2, controlled (H)      GERD (gastroesophageal reflux disease)      History of seizure      History of substance abuse (H)      HIV (human immunodeficiency virus infection) (H)      HLD (hyperlipidemia)      Lung nodules      Periungual wart      PTSD (post-traumatic stress disorder)      Sleep apnea     doesn't use CPAP       Past Surgical History   Past Surgical History:   Procedure Laterality Date     ANOSCOPY N/A 9/9/2020    Procedure: Exam Under Anesthesia, ANOSCOPY, fulgeration of rectal fissures with Rectal Biopsies;  Surgeon: Thanh Lundberg MD;  Location:  OR     COLONOSCOPY Left 1/22/2016    Procedure: COMBINED COLONOSCOPY, SINGLE OR MULTIPLE BIOPSY/POLYPECTOMY BY BIOPSY;  Surgeon: Clark Saini MD;  Location:  GI     ESOPHAGOSCOPY, GASTROSCOPY, DUODENOSCOPY (EGD), COMBINED N/A 6/6/2022    Procedure: ESOPHAGOGASTRODUODENOSCOPY, WITH BIOPSY;  Surgeon: Kecia Benítez MD;  Location:  GI     HC EXPLORE  UNDESC TESTIS,INGUIN/SCROTAL       LAPAROSCOPIC APPENDECTOMY N/A 1/31/2018    Procedure: LAPAROSCOPIC APPENDECTOMY;  LAPAROSCOPIC APPENDECTOMY;  Surgeon: Dawn Holt MD;  Location: [facility] OR     LAPAROSCOPY DIAGNOSTIC (GENERAL) N/A 7/26/2016    Procedure: LAPAROSCOPY DIAGNOSTIC (GENERAL);  Surgeon: uSsannah Arriaga MD;  Location: [facility] OR     LAPAROSCOPY DIAGNOSTIC (GENERAL) N/A 4/16/2018    Procedure: LAPAROSCOPY DIAGNOSTIC (GENERAL);  Diagnostic laparoscopy and lysis of adhesions;  Surgeon: Prince Dowling MD;  Location: [facility] OR     OPTICAL TRACKING SYSTEM CRANIOTOMY, EXCISE TUMOR, COMBINED Left 4/10/2015    Procedure: COMBINED OPTICAL TRACKING SYSTEM CRANIOTOMY, EXCISE TUMOR;  Surgeon: Mirlande Colmenares MD;  Location: [facility] OR     REPAIR GAMEKEEPER'S THUMB Right 12/2/2016    Procedure: REPAIR LIGAMENT ULNAR COLLATERAL THUMB (GAMEKEEPER'S);  Surgeon: Evin Zamorano MD;  Location:  OR     [facility] NONSPECIFIC PROCEDURE      right forearm fracture       Prior to Admission Medications   I have reviewed this patient's current medications       Review of Systems    The 10 point Review of Systems is negative other than noted in the HPI or here.       Social History   I have reviewed this patient's social history and updated it with pertinent information if needed.  Social History     Tobacco Use     Smoking status: Some Days     Packs/day: 0.25     Years: 40.00     Pack years: 10.00     Types: Cigarettes     Smokeless tobacco: Former   Substance Use Topics     Alcohol use: No     Alcohol/week: 0.0 standard drinks     Comment: Last etoh in 2007     Drug use: Not Currently     Types: Marijuana, Methamphetamines       Family History   I have reviewed this patient's family history and updated it with pertinent information if needed.  Family History   Problem Relation Age of Onset     Diabetes Brother      Diabetes Father      Alzheimer Disease Father      Unknown/Adopted Mother      Diabetes Paternal  "Grandfather      Cancer No family hx of         no skin cancer     Skin Cancer No family hx of         no famiy hx of skin cancer     Glaucoma No family hx of      Macular Degeneration No family hx of        Allergies   Allergies   Allergen Reactions     Fentanyl Blisters     Per pt, after taking this medication had blisters develope     Tylenol [Acetaminophen] Itching     Dulaglutide Rash     Insulin Lispro Rash     Patient reported     Penicillin V Other (See Comments) and Rash     Diffuse maculopapular rash + feels \"high\", per pt.         Physical Exam   Vital Signs: Temp: 98.4  F (36.9  C) Temp src: Oral BP: (!) 148/86 Pulse: 97   Resp: 16 SpO2: 100 % O2 Device: None (Room air)    Weight: 150 lbs 0 oz    Intake/Output Summary (Last 24 hours) at 2/7/2023 0152  Last data filed at 2/6/2023 2232  Gross per 24 hour   Intake 500 ml   Output --   Net 500 ml       General: Alert, resting comfortably in NAD/NTA. Cooperative  HEENT: NC/AT, sclerae anicteric.Oral mucosa moist  Neck: Trachea midline  Pulm: NLB on RA, no tachypnea/dyspnea  CV: RRR by RP, non-cyanotic, no LE edema.  ABD: Soft but moderately TTP diffusely in the abdomen and quite distended. No guarding or rebound tenderness.   Extrem: MA4E, no obvious deformities  Neuro:  A/Ox3, NFD,   Skin: Warm and well perfused                Data   ------------------------- PAST 24 HR DATA REVIEWED -----------------------------------------------    I have personally reviewed the following data over the past 24 hrs:    10.4  \   16.1   / 404     134 (L) 92 (L) 17.2 /  330 (H)   4.6 27 0.80 \       ALT: 18 AST: 24 AP: 110 TBILI: 1.2   ALB: 4.4 TOT PROTEIN: 8.1 LIPASE: 61 (H)       Trop: 9 BNP: N/A       Procal: N/A CRP: N/A Lactic Acid: 1.3       INR:  0.98 PTT:  N/A   D-dimer:  N/A Fibrinogen:  N/A       Imaging results reviewed over the past 24 hrs:   Recent Results (from the past 24 hour(s))   CT Chest/Abdomen/Pelvis w Contrast    Narrative    EXAM: CT " CHEST/ABDOMEN/PELVIS W CONTRAST  LOCATION: [facility]  DATE/TIME: 2/6/2023 11:00 PM    INDICATION: Chest and abdominal pain.  COMPARISON: CT abdomen/pelvis 01/24/2023, CT chest 07/08/2020  TECHNIQUE: CT scan of the chest, abdomen, and pelvis was performed following injection of IV contrast. Multiplanar reformats were obtained. Dose reduction techniques were used.   CONTRAST: 86 ml isovue 370     FINDINGS:   LUNGS AND PLEURA: Unchanged 0.7 cm nodular opacity in the right middle lobe (series 8, image 159), which has a flattened appearance on sagittal and coronal imaging and is unchanged since 2020, benign; no follow-up indicated. No focal airspace disease. No   pleural effusion or pneumothorax.    MEDIASTINUM/AXILLAE: No lymphadenopathy. No pericardial effusion.    CORONARY ARTERY CALCIFICATION: Moderate.    HEPATOBILIARY: Liver appears normal. Tiny gallstone in the gallbladder. No gallbladder wall thickening or pericholecystic fluid. No biliary dilatation.    PANCREAS: Normal.    SPLEEN: Normal.    ADRENAL GLANDS: Normal.    KIDNEYS/BLADDER: Kidneys appear normal. No hydronephrosis. Unchanged mild concentric wall thickening of the urinary bladder.    BOWEL: Proximal small bowel is normal caliber. Dilated loop of jejunum in the mid abdomen measuring up to 4.7 cm in diameter. There is transition to normal caliber distal small bowel in the right lower quadrant (series 7, image 35). Colon appears normal.   No bowel bowel wall thickening or evidence of inflammation. No pneumatosis.    LYMPH NODES: No lymphadenopathy.    VASCULATURE: Mild atherosclerotic calcifications.    PELVIC ORGANS: Enlarged prostate. Penile implant with reservoir in the left hemipelvis.    MUSCULOSKELETAL: Unremarkable.      Impression    IMPRESSION:  1.  Dilated jejunal loop with transition to normal caliber small bowel in the right lower quadrant, suspicious for partial small bowel obstruction, possibly  due to adhesion or stricture. No pneumatosis.    2.  No acute findings in the chest.

## 2023-02-07 NOTE — ED NOTES
"Patient refused PIV start from [facility]. Stated\" I want ultrasound\".  Moved to waiting room due to capacity.  "

## 2023-02-07 NOTE — PROGRESS NOTES
[facility]    Progress Note - General Surgery Service       Date of Admission:  2/6/2023    Assessment & Plan: Surgery   Andrew Collier is a 57 year old male w/ PMH including HIV, HLD, Seizures, substance use disorder, GERD, and DM2 and recurrent adhesive SBO presenting 2/6 w/ acute on chronic abdominal pain c/f recurrent SBO.   NGT placed in ED, patient pulled this himself overnight. Feeling better this morning, denies pain, abd soft.   - No plans for surgical intervention at this time (additionally patient states he would not be interested in surgery if it were offered)  - Continue NPO  - Gastrografin challenge PO (ordered)        Diet: NPO for Medical/Clinical Reasons Except for: Ice Chips, Meds    DVT Prophylaxis: per primary team  Potts Catheter: Not present  Lines: None     Drains: None     Cardiac Monitoring: None  Code Status: Full Code      Clinically Significant Risk Factors Present on Admission                      # DMII: A1C = 12.8 % (Ref range: <5.7 %) within past 3 months               Expected Discharge Date: 02/08/2023                 The patient's care was discussed with the chief resident who will discuss with attending.     Cielo Harrell MD  [facility]  Text page via [facility] Paging/Directory     ______________________________________________________________________    Interval History   Patient removed his own NGT overnight. Says he is feeling better this morning. Denies abdominal pain, nausea. Not interested in surgery. Concerned about currently not having housing.     Physical Exam   Vital Signs: Temp: 98.6  F (37  C) Temp src: Oral BP: (!) 140/82 Pulse: 82   Resp: 16 SpO2: 100 % O2 Device: None (Room air)    Weight: 150 lbs 0 oz    Intake/Output Summary (Last 24 hours) at 2/7/2023 0805  Last data filed at 2/6/2023 2232  Gross per 24 hour   Intake 500 ml   Output --   Net 500 ml      General: NAD, non-toxic appearing  Cardio: RRR  Resp: breathing non-labored on room air  Abd: soft, non-tender, non-distended, no signs of peritonitis  Extremities: wwp  Neuro: AOx4            Data     I have personally reviewed the following data over the past 24 hrs:    10.4  \   16.1   / 404     134 (L) 92 (L) 17.2 /  330 (H)   4.6 27 0.80 \       ALT: 18 AST: 24 AP: 110 TBILI: 1.2   ALB: 4.4 TOT PROTEIN: 8.1 LIPASE: 61 (H)       Trop: 9 BNP: N/A       Procal: N/A CRP: N/A Lactic Acid: 1.3       INR:  0.98 PTT:  N/A   D-dimer:  N/A Fibrinogen:  N/A       Imaging results reviewed over the past 24 hrs:   Recent Results (from the past 24 hour(s))   CT Chest/Abdomen/Pelvis w Contrast    Narrative    EXAM: CT CHEST/ABDOMEN/PELVIS W CONTRAST  LOCATION: [facility]  DATE/TIME: 2/6/2023 11:00 PM    INDICATION: Chest and abdominal pain.  COMPARISON: CT abdomen/pelvis 01/24/2023, CT chest 07/08/2020  TECHNIQUE: CT scan of the chest, abdomen, and pelvis was performed following injection of IV contrast. Multiplanar reformats were obtained. Dose reduction techniques were used.   CONTRAST: 86 ml isovue 370     FINDINGS:   LUNGS AND PLEURA: Unchanged 0.7 cm nodular opacity in the right middle lobe (series 8, image 159), which has a flattened appearance on sagittal and coronal imaging and is unchanged since 2020, benign; no follow-up indicated. No focal airspace disease. No   pleural effusion or pneumothorax.    MEDIASTINUM/AXILLAE: No lymphadenopathy. No pericardial effusion.    CORONARY ARTERY CALCIFICATION: Moderate.    HEPATOBILIARY: Liver appears normal. Tiny gallstone in the gallbladder. No gallbladder wall thickening or pericholecystic fluid. No biliary dilatation.    PANCREAS: Normal.    SPLEEN: Normal.    ADRENAL GLANDS: Normal.    KIDNEYS/BLADDER: Kidneys appear normal. No hydronephrosis. Unchanged mild concentric wall thickening of the urinary bladder.    BOWEL:  Proximal small bowel is normal caliber. Dilated loop of jejunum in the mid abdomen measuring up to 4.7 cm in diameter. There is transition to normal caliber distal small bowel in the right lower quadrant (series 7, image 35). Colon appears normal.   No bowel bowel wall thickening or evidence of inflammation. No pneumatosis.    LYMPH NODES: No lymphadenopathy.    VASCULATURE: Mild atherosclerotic calcifications.    PELVIC ORGANS: Enlarged prostate. Penile implant with reservoir in the left hemipelvis.    MUSCULOSKELETAL: Unremarkable.      Impression    IMPRESSION:  1.  Dilated jejunal loop with transition to normal caliber small bowel in the right lower quadrant, suspicious for partial small bowel obstruction, possibly due to adhesion or stricture. No pneumatosis.    2.  No acute findings in the chest.   XR Abdomen Port 1 View    Narrative    EXAM: XR ABDOMEN PORT 1 VIEW, XR ABDOMEN PORT 1 VIEW  LOCATION: [facility]  DATE/TIME: 2/7/2023 2:10 AM    INDICATION: NGT  VERIFICATION  COMPARISON: None.      Impression    IMPRESSION: 2 images of the abdomen. The initial image demonstrates the NG tube tip at the GE junction. On the final image, the tube has been advanced into the body of the stomach.   XR Abdomen Port 1 View    Narrative    EXAM: XR ABDOMEN PORT 1 VIEW, XR ABDOMEN PORT 1 VIEW  LOCATION: [facility]  DATE/TIME: 2/7/2023 2:10 AM    INDICATION: NGT  VERIFICATION  COMPARISON: None.      Impression    IMPRESSION: 2 images of the abdomen. The initial image demonstrates the NG tube tip at the GE junction. On the final image, the tube has been advanced into the body of the stomach.

## 2023-02-07 NOTE — PROGRESS NOTES
"Brief Surgery Note      Patient feeling better, denies abdo pain, resting in bed comfortably.   Patient pulled NGT, claiming he no longer wanted it.     BP (!) 156/108   Pulse 99   Temp 98.4  F (36.9  C) (Oral)   Resp 16   Ht 1.676 m (5' 6\")   Wt 68 kg (150 lb)   SpO2 98%   BMI 24.21 kg/m       Abdo: soft, mild tenderness to deep palpation in suprapubic/LLQ, mildly distended, no guarding    Farhana Gentile  [facility] General Surgery PGY1   "

## 2023-02-08 NOTE — DISCHARGE SUMMARY
[facility]  Discharge Summary - Medicine & Pediatrics       Date of Admission:  2/6/2023  Date of Discharge:  2/8/2023 12:22 AM  Discharging Provider: Dr. Reji Negro  Discharge Service: Medicine Service, JORGE TEAM 5    Discharge Diagnoses   Partial SBO  T2DM, uncontrolled (A1C 12.8% in 12/2022)  HIV  LSIL (diagnosed outpatient on rectal cytology; has not followed up recently for this)  GERD    Follow-ups Needed After Discharge   Follow-up Appointments     Adult [facility] Follow-up and recommended labs and tests      Follow up with primary care provider, Chevy Reynoso, within 2 weeks   for diabetes follow up    Appointments on [place] and/or [facility] (with [facility] or [facility]   provider or service). Call [phone] if you haven't heard regarding   these appointments within 7 days of discharge.             Unresulted Labs Ordered in the Past 30 Days of this Admission     Date and Time Order Name Status Description    2/6/2023  8:45 PM Blood Culture Peripheral Blood Preliminary     2/6/2023  8:45 PM Blood Culture Peripheral Blood Preliminary       These results will be followed up by patient's PCP    Discharge Disposition   Discharged to home against medical advice  Condition at discharge: Guarded    Hospital Course   Andrew Lockwood is a 57 year old male with history of housing insecurity, HIV on Biktarvy, prior recurrent SBOs, uncontrolled DM II, HLD, GERD, chronic abdominal pain, prior CNS toxoplasmosis, history of substance abuse, PTSD presenting with recurrent partial small bowel obstruction. HDS and planning for trial of medical management. He was admitted on 2/6/2023 for the above issues. The following problems were addressed during his hospitalization:    Partial SBO  History of recurrent partial SBO all managed conservatively. Similar symptoms as prior, no BM/flatus x3 days. CT AP with partial obstruction with dilated jejunal loop in RLQ, no bowel wall  thickening. Hx of appendectomy ~3 yrs ago but states had problems with recurrent SBOs even prior to that. Surgery was consulted while patient was hospitalized and there was no plan for surgical intervention as patient has history of partial SBOs and he also declined surgery given his housing instability. An NGT was placed in the ER at time of presentation, but patient removed as it was uncomfortable and he declined replacement of the NGT despite education on how his condition could worsen without it. He was given IVF while NPO. He passed a gastrograffin challenge on 2/7 with contrast visualized throughout the colon. His diet was advanced to CLD prior to his AMA discharge but he would not answer questions to how he was feeling and if he had passed BM/flatus.    Hyperglycemia   DM II Hgb A1c 12.8% (12/2022)  Pseudohyponatremia - Corrected Na 138  Reports not taking insulin as he believes it causes his bowel obstructions. Does take his metformin. No anion gap or acidosis; mild lactate elevation that improved with 500 mL IVF. Was scheduled with [facility] pharmacy to discuss alternative options 2/1, however, missed appt as presented to ER. He takes Metformin at home but nothing else. Patient left AMA without getting other scripts filled for his T2DM, While hospitalized, he was treated with MD-sliding scale insulin.  - Followup with PCP for further management of uncontrolled T2DM     CHRONIC & STABLE PROBLEMS  HIV-  Continue PTA biktarvy (last saw ID 1/2023), needs to f/u in April 2023  LSIL (diagnosed via rectal cytology in the past) - f/u with [facility]  GERD - continue PTA famotidine    Consultations This Hospital Stay   NURSING TO CONSULT FOR VASCULAR ACCESS CARE IP CONSULT  SURGERY GENERAL ADULT IP CONSULT    Code Status   Prior       The patient was discussed with Dr. Schneider but was not seen on the day of discharge.    Reji Negro MD  [facility] EMERGENCY DEPARTMENT  [address]  [address]  Phone: [phone]  ______________________________________________________________________    Physical Exam   No physical exam performed as patient left AMA prior to being seen by [place] [id number] / primary hospital team.      Primary Care Physician   Chevy Reynoso    Discharge Orders      Reason for your hospital stay    Patient admitted w/ concern for SBO, has history of recurrent SBO in past. Refused NG. Normal gastrograffin test. Left AMA after feeling his room was too cold, no other rooms available to switch to     Activity    Your activity upon discharge: activity as tolerated     Adult [facility] Follow-up and recommended labs and tests    Follow up with primary care provider, Chevy Reynoso, within 2 weeks for diabetes follow up    Appointments on [place] and/or [facility] (with [facility] or [facility] provider or service). Call [phone] if you haven't heard regarding these appointments within 7 days of discharge.     Diet    Follow this diet upon discharge: Advance diet as tolerated       Significant Results and Procedures   Most Recent 3 CBC's:Recent Labs   Lab Test 02/06/23 2017 02/01/23 2006 01/24/23  1734   WBC 10.4 7.8 14.8*   HGB 16.1 14.1 14.6   MCV 89 86 89    416 479*     Most Recent 3 BMP's:Recent Labs   Lab Test 02/07/23  1954 02/07/23  1722 02/07/23  1204 02/07/23  0928 02/06/23 2017 02/02/23  0126 02/01/23 2006 01/24/23  1734   NA  --   --   --   --  134*  --  136 135*   POTASSIUM  --   --   --   --  4.6  --  3.8 4.1   CHLORIDE  --   --   --   --  92*  --  101 96*   CO2  --   --   --   --  27  --  29 22   BUN  --   --   --   --  17.2  --  22 26.8*   CR  --   --   --   --  0.80  --  0.55* 0.66*   ANIONGAP  --   --   --   --  15  --  6 17*   LINDA  --   --   --   --  9.9  --  9.7 9.2   * 232* 299*   < > 330*   < > 300* 424*    < > = values in this interval not displayed.     Most Recent 2 LFT's:Recent Labs   Lab Test 02/06/23  2017 02/01/23 2006   AST 24 15   ALT 18  31   ALKPHOS 110 147   BILITOTAL 1.2 0.4     Most Recent 3 INR's:Recent Labs   Lab Test 02/06/23  2017 12/23/22  1940 12/17/22  0812   INR 0.98 0.95 0.97   ,   Results for orders placed or performed during the hospital encounter of 02/06/23   CT Chest/Abdomen/Pelvis w Contrast    Narrative    EXAM: CT CHEST/ABDOMEN/PELVIS W CONTRAST  LOCATION: [facility]  DATE/TIME: 2/6/2023 11:00 PM    INDICATION: Chest and abdominal pain.  COMPARISON: CT abdomen/pelvis 01/24/2023, CT chest 07/08/2020  TECHNIQUE: CT scan of the chest, abdomen, and pelvis was performed following injection of IV contrast. Multiplanar reformats were obtained. Dose reduction techniques were used.   CONTRAST: 86 ml isovue 370     FINDINGS:   LUNGS AND PLEURA: Unchanged 0.7 cm nodular opacity in the right middle lobe (series 8, image 159), which has a flattened appearance on sagittal and coronal imaging and is unchanged since 2020, benign; no follow-up indicated. No focal airspace disease. No   pleural effusion or pneumothorax.    MEDIASTINUM/AXILLAE: No lymphadenopathy. No pericardial effusion.    CORONARY ARTERY CALCIFICATION: Moderate.    HEPATOBILIARY: Liver appears normal. Tiny gallstone in the gallbladder. No gallbladder wall thickening or pericholecystic fluid. No biliary dilatation.    PANCREAS: Normal.    SPLEEN: Normal.    ADRENAL GLANDS: Normal.    KIDNEYS/BLADDER: Kidneys appear normal. No hydronephrosis. Unchanged mild concentric wall thickening of the urinary bladder.    BOWEL: Proximal small bowel is normal caliber. Dilated loop of jejunum in the mid abdomen measuring up to 4.7 cm in diameter. There is transition to normal caliber distal small bowel in the right lower quadrant (series 7, image 35). Colon appears normal.   No bowel bowel wall thickening or evidence of inflammation. No pneumatosis.    LYMPH NODES: No lymphadenopathy.    VASCULATURE: Mild atherosclerotic calcifications.    PELVIC  ORGANS: Enlarged prostate. Penile implant with reservoir in the left hemipelvis.    MUSCULOSKELETAL: Unremarkable.      Impression    IMPRESSION:  1.  Dilated jejunal loop with transition to normal caliber small bowel in the right lower quadrant, suspicious for partial small bowel obstruction, possibly due to adhesion or stricture. No pneumatosis.    2.  No acute findings in the chest.   XR Abdomen Port 1 View    Narrative    EXAM: XR ABDOMEN PORT 1 VIEW, XR ABDOMEN PORT 1 VIEW  LOCATION: [facility]  DATE/TIME: 2/7/2023 2:10 AM    INDICATION: NGT  VERIFICATION  COMPARISON: None.      Impression    IMPRESSION: 2 images of the abdomen. The initial image demonstrates the NG tube tip at the GE junction. On the final image, the tube has been advanced into the body of the stomach.   XR Abdomen Port 1 View    Narrative    EXAM: XR ABDOMEN PORT 1 VIEW, XR ABDOMEN PORT 1 VIEW  LOCATION: [facility]  DATE/TIME: 2/7/2023 2:10 AM    INDICATION: NGT  VERIFICATION  COMPARISON: None.      Impression    IMPRESSION: 2 images of the abdomen. The initial image demonstrates the NG tube tip at the GE junction. On the final image, the tube has been advanced into the body of the stomach.   XR Gastrografin Challenge    Narrative    Exam: XR GASTROGRAFIN CHALLENGE, 2/7/2023 4:46 PM    Indication: Small bowel obstruction    Comparison: Abdominal radiograph 2/7/2023.    Findings:   Portable AP supine radiograph of the abdomen performed 8 hours after  administration of contrast. Interval removal of gastric tube. Contrast  visualized in the ascending, transverse and descending colon. Several  distended loops of small bowel containing contrast are visualized in  the midabdomen. No pneumatosis or portal venous gas. No focal  consolidation.      Impression    Impression: No findings to suggest complete small bowel obstruction.  Contrast visualized in the  "ascending, transverse and descending colon    I have personally reviewed the examination and initial interpretation  and I agree with the findings.    ADINA JACKMAN MD         SYSTEM ID:  [id number]     *Note: Due to a large number of results and/or encounters for the requested time period, some results have not been displayed. A complete set of results can be found in Results Review.       Discharge Medications   Discharge Medication List as of 2/8/2023 12:23 AM      CONTINUE these medications which have NOT CHANGED    Details   bictegravir-emtricitabine-tenofovir (BIKTARVY) -25 MG per tablet Take 1 tablet by mouth daily, Disp-30 tablet, R-2, E-Prescribe      blood glucose (NO BRAND SPECIFIED) lancets standard Use to test blood sugar 1 time daily or as directed.Disp-100 lancet, [id number]      blood glucose (NO BRAND SPECIFIED) test strip Use to test blood sugar 1 time daily or as directed., Disp-100 strip, R-4, E-Prescribe      blood glucose monitoring (NO BRAND SPECIFIED) meter device kit Use to test blood sugar 1 time daily or as directed.Disp-1 kit, [id number]      famotidine (PEPCID) 40 MG tablet Take 1 tablet (40 mg) by mouth daily, Disp-30 tablet, R-1, E-Prescribe      metFORMIN (GLUCOPHAGE XR) 500 MG 24 hr tablet Take 3 tablets (1,500 mg) by mouth daily (with dinner) With food, Disp-90 tablet, R-3, E-Prescribe           Allergies   Allergies   Allergen Reactions     Fentanyl Blisters     Per pt, after taking this medication had blisters develope     Tylenol [Acetaminophen] Itching     Dulaglutide Rash     Insulin Lispro Rash     Patient reported     Penicillin V Other (See Comments) and Rash     Diffuse maculopapular rash + feels \"high\", per pt.      "

## 2023-02-08 NOTE — PROGRESS NOTES
Paged about patient wanting to leave AMA as his room was too cold. Had no other rooms available to be moved to. Given warm blankets but he still requested to leave AMA. Reviewed risks of leaving AMA with patient which he understood and still requested to leave, began attempting to remove his IVs. Left AMA    Jose Cottrell, PGY1

## 2023-02-08 NOTE — PROGRESS NOTES
"Patient spoke to provider prior to leaving AMA. Paperwork signed, IV removed, and pt ambulated out independently. A&O and steady on feet. Patient stated his \"room being too cold\" as reason for wanting to leave. Patient was offered warm blankets and multiple staff attempted to comfort.  "

## 2023-02-08 NOTE — PROVIDER NOTIFICATION
Cristina paged:    pt is requesting to leave the hospital and wishes to speak to his primary. thanks

## 2023-02-08 NOTE — DISCHARGE INSTRUCTIONS
Follow up w/ PCP about uncontrolled diabetes  If having abdominal pain, nausea, vomiting, inability to tolerate oral intake, abdominal distension, inability to pass gas or have bowel movements, reseek medical attention

## 2023-02-11 LAB
BACTERIA BLD CULT: NO GROWTH
BACTERIA BLD CULT: NO GROWTH

## 2023-02-15 ENCOUNTER — APPOINTMENT (OUTPATIENT)
Dept: GENERAL RADIOLOGY | Facility: CLINIC | Age: 58
End: 2023-02-15
Attending: PHYSICIAN ASSISTANT
Payer: MEDICAID

## 2023-02-15 ENCOUNTER — HOSPITAL ENCOUNTER (INPATIENT)
Facility: CLINIC | Age: 58
LOS: 4 days | Discharge: NURSING, REHAB OR LONG-TERM CARE | End: 2023-02-20
Attending: EMERGENCY MEDICINE | Admitting: INTERNAL MEDICINE
Payer: MEDICAID

## 2023-02-15 ENCOUNTER — APPOINTMENT (OUTPATIENT)
Dept: CT IMAGING | Facility: CLINIC | Age: 58
End: 2023-02-15
Attending: EMERGENCY MEDICINE
Payer: MEDICAID

## 2023-02-15 ENCOUNTER — APPOINTMENT (OUTPATIENT)
Dept: ULTRASOUND IMAGING | Facility: CLINIC | Age: 58
End: 2023-02-15
Attending: EMERGENCY MEDICINE
Payer: MEDICAID

## 2023-02-15 DIAGNOSIS — K21.9 GASTROESOPHAGEAL REFLUX DISEASE, UNSPECIFIED WHETHER ESOPHAGITIS PRESENT: ICD-10-CM

## 2023-02-15 DIAGNOSIS — F15.10 METHAMPHETAMINE ABUSE (H): Primary | ICD-10-CM

## 2023-02-15 DIAGNOSIS — K21.9 GASTROESOPHAGEAL REFLUX DISEASE WITHOUT ESOPHAGITIS: ICD-10-CM

## 2023-02-15 DIAGNOSIS — E11.9 TYPE 2 DIABETES MELLITUS WITHOUT COMPLICATION, WITHOUT LONG-TERM CURRENT USE OF INSULIN (H): ICD-10-CM

## 2023-02-15 DIAGNOSIS — F41.1 GENERALIZED ANXIETY DISORDER: ICD-10-CM

## 2023-02-15 DIAGNOSIS — E87.1 HYPOSMOLALITY SYNDROME: ICD-10-CM

## 2023-02-15 DIAGNOSIS — F32.A DEPRESSIVE TYPE PSYCHOSIS: ICD-10-CM

## 2023-02-15 DIAGNOSIS — L03.114 CELLULITIS OF LEFT HAND EXCLUDING FINGERS AND THUMB: ICD-10-CM

## 2023-02-15 DIAGNOSIS — Z86.14 PERSONAL HISTORY OF METHICILLIN RESISTANT STAPHYLOCOCCUS AUREUS: ICD-10-CM

## 2023-02-15 DIAGNOSIS — Z79.84 LONG TERM CURRENT USE OF ORAL HYPOGLYCEMIC DRUG: ICD-10-CM

## 2023-02-15 DIAGNOSIS — Z21 HUMAN IMMUNODEFICIENCY VIRUS I INFECTION (H): ICD-10-CM

## 2023-02-15 DIAGNOSIS — L03.114 CELLULITIS OF LEFT WRIST: ICD-10-CM

## 2023-02-15 LAB
ANION GAP SERPL CALCULATED.3IONS-SCNC: 10 MMOL/L (ref 7–15)
BASOPHILS # BLD AUTO: 0 10E3/UL (ref 0–0.2)
BASOPHILS NFR BLD AUTO: 0 %
BUN SERPL-MCNC: 27.7 MG/DL (ref 8–23)
CALCIUM SERPL-MCNC: 8.6 MG/DL (ref 8.6–10)
CHLORIDE SERPL-SCNC: 97 MMOL/L (ref 98–107)
CREAT SERPL-MCNC: 0.71 MG/DL (ref 0.67–1.17)
CRP SERPL-MCNC: 91.4 MG/L
DEPRECATED HCO3 PLAS-SCNC: 25 MMOL/L (ref 22–29)
EOSINOPHIL # BLD AUTO: 0.1 10E3/UL (ref 0–0.7)
EOSINOPHIL NFR BLD AUTO: 1 %
ERYTHROCYTE [DISTWIDTH] IN BLOOD BY AUTOMATED COUNT: 12.3 % (ref 10–15)
GFR SERPL CREATININE-BSD FRML MDRD: >90 ML/MIN/1.73M2
GLUCOSE BLDC GLUCOMTR-MCNC: 219 MG/DL (ref 70–99)
GLUCOSE BLDC GLUCOMTR-MCNC: 240 MG/DL (ref 70–99)
GLUCOSE BLDC GLUCOMTR-MCNC: 322 MG/DL (ref 70–99)
GLUCOSE SERPL-MCNC: 317 MG/DL (ref 70–99)
HCT VFR BLD AUTO: 35 % (ref 40–53)
HGB BLD-MCNC: 11.5 G/DL (ref 13.3–17.7)
IMM GRANULOCYTES # BLD: 0.1 10E3/UL
IMM GRANULOCYTES NFR BLD: 1 %
LYMPHOCYTES # BLD AUTO: 1.3 10E3/UL (ref 0.8–5.3)
LYMPHOCYTES NFR BLD AUTO: 11 %
MCH RBC QN AUTO: 28.5 PG (ref 26.5–33)
MCHC RBC AUTO-ENTMCNC: 32.9 G/DL (ref 31.5–36.5)
MCV RBC AUTO: 87 FL (ref 78–100)
MONOCYTES # BLD AUTO: 0.8 10E3/UL (ref 0–1.3)
MONOCYTES NFR BLD AUTO: 8 %
NEUTROPHILS # BLD AUTO: 8.8 10E3/UL (ref 1.6–8.3)
NEUTROPHILS NFR BLD AUTO: 79 %
NRBC # BLD AUTO: 0 10E3/UL
NRBC BLD AUTO-RTO: 0 /100
PLATELET # BLD AUTO: 339 10E3/UL (ref 150–450)
POTASSIUM SERPL-SCNC: 4.2 MMOL/L (ref 3.4–5.3)
RBC # BLD AUTO: 4.04 10E6/UL (ref 4.4–5.9)
SODIUM SERPL-SCNC: 132 MMOL/L (ref 136–145)
WBC # BLD AUTO: 11.1 10E3/UL (ref 4–11)

## 2023-02-15 PROCEDURE — 250N000013 HC RX MED GY IP 250 OP 250 PS 637: Performed by: PHYSICIAN ASSISTANT

## 2023-02-15 PROCEDURE — 96375 TX/PRO/DX INJ NEW DRUG ADDON: CPT

## 2023-02-15 PROCEDURE — 250N000011 HC RX IP 250 OP 636: Performed by: EMERGENCY MEDICINE

## 2023-02-15 PROCEDURE — 96366 THER/PROPH/DIAG IV INF ADDON: CPT

## 2023-02-15 PROCEDURE — G0378 HOSPITAL OBSERVATION PER HR: HCPCS

## 2023-02-15 PROCEDURE — 93005 ELECTROCARDIOGRAM TRACING: CPT

## 2023-02-15 PROCEDURE — 250N000012 HC RX MED GY IP 250 OP 636 PS 637: Performed by: PHYSICIAN ASSISTANT

## 2023-02-15 PROCEDURE — 99222 1ST HOSP IP/OBS MODERATE 55: CPT | Mod: FS | Performed by: EMERGENCY MEDICINE

## 2023-02-15 PROCEDURE — 258N000003 HC RX IP 258 OP 636: Performed by: EMERGENCY MEDICINE

## 2023-02-15 PROCEDURE — 99284 EMERGENCY DEPT VISIT MOD MDM: CPT | Performed by: EMERGENCY MEDICINE

## 2023-02-15 PROCEDURE — 99285 EMERGENCY DEPT VISIT HI MDM: CPT | Mod: 25 | Performed by: EMERGENCY MEDICINE

## 2023-02-15 PROCEDURE — 36415 COLL VENOUS BLD VENIPUNCTURE: CPT | Performed by: EMERGENCY MEDICINE

## 2023-02-15 PROCEDURE — 73201 CT UPPER EXTREMITY W/DYE: CPT | Mod: 26 | Performed by: RADIOLOGY

## 2023-02-15 PROCEDURE — 73201 CT UPPER EXTREMITY W/DYE: CPT | Mod: LT

## 2023-02-15 PROCEDURE — 96361 HYDRATE IV INFUSION ADD-ON: CPT

## 2023-02-15 PROCEDURE — 73110 X-RAY EXAM OF WRIST: CPT | Mod: 26 | Performed by: RADIOLOGY

## 2023-02-15 PROCEDURE — 250N000013 HC RX MED GY IP 250 OP 250 PS 637: Performed by: EMERGENCY MEDICINE

## 2023-02-15 PROCEDURE — 86140 C-REACTIVE PROTEIN: CPT | Performed by: EMERGENCY MEDICINE

## 2023-02-15 PROCEDURE — 76882 US LMTD JT/FCL EVL NVASC XTR: CPT | Mod: 26 | Performed by: RADIOLOGY

## 2023-02-15 PROCEDURE — 76882 US LMTD JT/FCL EVL NVASC XTR: CPT | Mod: LT

## 2023-02-15 PROCEDURE — 96372 THER/PROPH/DIAG INJ SC/IM: CPT | Performed by: PHYSICIAN ASSISTANT

## 2023-02-15 PROCEDURE — 82310 ASSAY OF CALCIUM: CPT | Performed by: EMERGENCY MEDICINE

## 2023-02-15 PROCEDURE — 93010 ELECTROCARDIOGRAM REPORT: CPT | Performed by: INTERNAL MEDICINE

## 2023-02-15 PROCEDURE — 82962 GLUCOSE BLOOD TEST: CPT

## 2023-02-15 PROCEDURE — 85025 COMPLETE CBC W/AUTO DIFF WBC: CPT | Performed by: EMERGENCY MEDICINE

## 2023-02-15 PROCEDURE — 99255 IP/OBS CONSLTJ NEW/EST HI 80: CPT | Performed by: PHYSICIAN ASSISTANT

## 2023-02-15 PROCEDURE — 250N000011 HC RX IP 250 OP 636: Performed by: PHYSICIAN ASSISTANT

## 2023-02-15 PROCEDURE — 96376 TX/PRO/DX INJ SAME DRUG ADON: CPT

## 2023-02-15 PROCEDURE — 73110 X-RAY EXAM OF WRIST: CPT | Mod: LT

## 2023-02-15 PROCEDURE — 96365 THER/PROPH/DIAG IV INF INIT: CPT

## 2023-02-15 RX ORDER — DEXTROSE MONOHYDRATE 25 G/50ML
25-50 INJECTION, SOLUTION INTRAVENOUS
Status: DISCONTINUED | OUTPATIENT
Start: 2023-02-15 | End: 2023-02-20 | Stop reason: HOSPADM

## 2023-02-15 RX ORDER — NALOXONE HYDROCHLORIDE 0.4 MG/ML
0.2 INJECTION, SOLUTION INTRAMUSCULAR; INTRAVENOUS; SUBCUTANEOUS
Status: DISCONTINUED | OUTPATIENT
Start: 2023-02-15 | End: 2023-02-20 | Stop reason: HOSPADM

## 2023-02-15 RX ORDER — IOPAMIDOL 755 MG/ML
92 INJECTION, SOLUTION INTRAVASCULAR ONCE
Status: COMPLETED | OUTPATIENT
Start: 2023-02-15 | End: 2023-02-15

## 2023-02-15 RX ORDER — OLANZAPINE 5 MG/1
5 TABLET, ORALLY DISINTEGRATING ORAL EVERY 4 HOURS PRN
Status: DISCONTINUED | OUTPATIENT
Start: 2023-02-15 | End: 2023-02-20 | Stop reason: HOSPADM

## 2023-02-15 RX ORDER — ONDANSETRON 4 MG/1
4 TABLET, ORALLY DISINTEGRATING ORAL EVERY 6 HOURS PRN
Status: DISCONTINUED | OUTPATIENT
Start: 2023-02-15 | End: 2023-02-20 | Stop reason: HOSPADM

## 2023-02-15 RX ORDER — PROCHLORPERAZINE MALEATE 10 MG
10 TABLET ORAL EVERY 6 HOURS PRN
Status: DISCONTINUED | OUTPATIENT
Start: 2023-02-15 | End: 2023-02-20 | Stop reason: HOSPADM

## 2023-02-15 RX ORDER — FAMOTIDINE 20 MG/1
40 TABLET, FILM COATED ORAL DAILY
Status: DISCONTINUED | OUTPATIENT
Start: 2023-02-15 | End: 2023-02-20 | Stop reason: HOSPADM

## 2023-02-15 RX ORDER — HYDROMORPHONE HYDROCHLORIDE 1 MG/ML
0.5 INJECTION, SOLUTION INTRAMUSCULAR; INTRAVENOUS; SUBCUTANEOUS ONCE
Status: COMPLETED | OUTPATIENT
Start: 2023-02-15 | End: 2023-02-15

## 2023-02-15 RX ORDER — LIDOCAINE 40 MG/G
CREAM TOPICAL
Status: DISCONTINUED | OUTPATIENT
Start: 2023-02-15 | End: 2023-02-20 | Stop reason: HOSPADM

## 2023-02-15 RX ORDER — PROCHLORPERAZINE 25 MG
25 SUPPOSITORY, RECTAL RECTAL EVERY 12 HOURS PRN
Status: DISCONTINUED | OUTPATIENT
Start: 2023-02-15 | End: 2023-02-20 | Stop reason: HOSPADM

## 2023-02-15 RX ORDER — NICOTINE POLACRILEX 4 MG
15-30 LOZENGE BUCCAL
Status: DISCONTINUED | OUTPATIENT
Start: 2023-02-15 | End: 2023-02-20 | Stop reason: HOSPADM

## 2023-02-15 RX ORDER — ONDANSETRON 2 MG/ML
4 INJECTION INTRAMUSCULAR; INTRAVENOUS EVERY 6 HOURS PRN
Status: DISCONTINUED | OUTPATIENT
Start: 2023-02-15 | End: 2023-02-20 | Stop reason: HOSPADM

## 2023-02-15 RX ORDER — NALOXONE HYDROCHLORIDE 0.4 MG/ML
0.4 INJECTION, SOLUTION INTRAMUSCULAR; INTRAVENOUS; SUBCUTANEOUS
Status: DISCONTINUED | OUTPATIENT
Start: 2023-02-15 | End: 2023-02-20 | Stop reason: HOSPADM

## 2023-02-15 RX ORDER — HYDROXYZINE HYDROCHLORIDE 50 MG/1
50 TABLET, FILM COATED ORAL EVERY 6 HOURS PRN
Status: DISCONTINUED | OUTPATIENT
Start: 2023-02-15 | End: 2023-02-20 | Stop reason: HOSPADM

## 2023-02-15 RX ORDER — HYDROMORPHONE HYDROCHLORIDE 1 MG/ML
0.5 INJECTION, SOLUTION INTRAMUSCULAR; INTRAVENOUS; SUBCUTANEOUS EVERY 4 HOURS PRN
Status: DISCONTINUED | OUTPATIENT
Start: 2023-02-15 | End: 2023-02-16

## 2023-02-15 RX ORDER — TRAMADOL HYDROCHLORIDE 50 MG/1
50 TABLET ORAL EVERY 6 HOURS PRN
Status: DISCONTINUED | OUTPATIENT
Start: 2023-02-15 | End: 2023-02-16

## 2023-02-15 RX ORDER — LORAZEPAM 2 MG/ML
1 INJECTION INTRAMUSCULAR ONCE
Status: COMPLETED | OUTPATIENT
Start: 2023-02-15 | End: 2023-02-15

## 2023-02-15 RX ORDER — OXYCODONE HYDROCHLORIDE 10 MG/1
10 TABLET ORAL ONCE
Status: COMPLETED | OUTPATIENT
Start: 2023-02-15 | End: 2023-02-15

## 2023-02-15 RX ORDER — AMOXICILLIN 250 MG
1 CAPSULE ORAL 2 TIMES DAILY
Status: DISCONTINUED | OUTPATIENT
Start: 2023-02-15 | End: 2023-02-20 | Stop reason: HOSPADM

## 2023-02-15 RX ORDER — HYDROXYZINE HYDROCHLORIDE 25 MG/1
25 TABLET, FILM COATED ORAL EVERY 6 HOURS PRN
Status: DISCONTINUED | OUTPATIENT
Start: 2023-02-15 | End: 2023-02-20 | Stop reason: HOSPADM

## 2023-02-15 RX ORDER — AMOXICILLIN 250 MG
2 CAPSULE ORAL 2 TIMES DAILY
Status: DISCONTINUED | OUTPATIENT
Start: 2023-02-15 | End: 2023-02-20 | Stop reason: HOSPADM

## 2023-02-15 RX ORDER — METFORMIN HCL 500 MG
1500 TABLET, EXTENDED RELEASE 24 HR ORAL
Status: DISCONTINUED | OUTPATIENT
Start: 2023-02-15 | End: 2023-02-19

## 2023-02-15 RX ORDER — IBUPROFEN 600 MG/1
600 TABLET, FILM COATED ORAL EVERY 6 HOURS PRN
Status: DISCONTINUED | OUTPATIENT
Start: 2023-02-15 | End: 2023-02-16

## 2023-02-15 RX ORDER — CLINDAMYCIN HCL 300 MG
300 CAPSULE ORAL ONCE
Status: COMPLETED | OUTPATIENT
Start: 2023-02-15 | End: 2023-02-15

## 2023-02-15 RX ADMIN — HYDROMORPHONE HYDROCHLORIDE 0.5 MG: 1 INJECTION, SOLUTION INTRAMUSCULAR; INTRAVENOUS; SUBCUTANEOUS at 10:49

## 2023-02-15 RX ADMIN — LORAZEPAM 1 MG: 2 INJECTION INTRAMUSCULAR; INTRAVENOUS at 13:37

## 2023-02-15 RX ADMIN — VANCOMYCIN HYDROCHLORIDE 1500 MG: 10 INJECTION, POWDER, LYOPHILIZED, FOR SOLUTION INTRAVENOUS at 10:50

## 2023-02-15 RX ADMIN — FAMOTIDINE 40 MG: 20 TABLET ORAL at 13:02

## 2023-02-15 RX ADMIN — HYDROMORPHONE HYDROCHLORIDE 0.5 MG: 1 INJECTION, SOLUTION INTRAMUSCULAR; INTRAVENOUS; SUBCUTANEOUS at 13:02

## 2023-02-15 RX ADMIN — HYDROXYZINE HYDROCHLORIDE 25 MG: 25 TABLET, FILM COATED ORAL at 13:02

## 2023-02-15 RX ADMIN — OXYCODONE HYDROCHLORIDE 10 MG: 10 TABLET ORAL at 08:19

## 2023-02-15 RX ADMIN — INSULIN ASPART 5 UNITS: 100 INJECTION, SOLUTION INTRAVENOUS; SUBCUTANEOUS at 14:35

## 2023-02-15 RX ADMIN — VANCOMYCIN HYDROCHLORIDE 1250 MG: 10 INJECTION, POWDER, LYOPHILIZED, FOR SOLUTION INTRAVENOUS at 23:36

## 2023-02-15 RX ADMIN — SODIUM CHLORIDE 1000 ML: 9 INJECTION, SOLUTION INTRAVENOUS at 10:50

## 2023-02-15 RX ADMIN — CLINDAMYCIN HYDROCHLORIDE 300 MG: 300 CAPSULE ORAL at 08:18

## 2023-02-15 RX ADMIN — IOPAMIDOL 92 ML: 755 INJECTION, SOLUTION INTRAVENOUS at 14:09

## 2023-02-15 RX ADMIN — IBUPROFEN 600 MG: 600 TABLET, FILM COATED ORAL at 12:38

## 2023-02-15 RX ADMIN — INSULIN ASPART 4 UNITS: 100 INJECTION, SOLUTION INTRAVENOUS; SUBCUTANEOUS at 17:41

## 2023-02-15 ASSESSMENT — ACTIVITIES OF DAILY LIVING (ADL)
ADLS_ACUITY_SCORE: 33
ADLS_ACUITY_SCORE: 37
ADLS_ACUITY_SCORE: 33
ADLS_ACUITY_SCORE: 39
ADLS_ACUITY_SCORE: 37
ADLS_ACUITY_SCORE: 33
ADLS_ACUITY_SCORE: 39
ADLS_ACUITY_SCORE: 33

## 2023-02-15 NOTE — H&P
"[facility]    History and Physical: ED Observation        Date of Admission:  2/15/2023      AssessmentAndrew Collier is a 57 year old male who has a history significant for IV methamphetamine  Abuse, anxiety, depression, HIV admitted to OBS for antibiotic treatment for cellulitis of left wrist from at IV injection site.     Infectious disease:   # Left Forearm Cellulitis   Patient has swelling and erythremia to left forearm. This is an area where he injected methamphetamines yesterday. He does not want to move arm because of pain. He also will not let any other providers manipulate his hands or examine area. There does not appear to be any visible area of abscess that would require I&D. Patient was admitted to Obs for IV antibiotics and monitoring.   - WBC: 11.1  - CRP: 91.40  - Tmax: 101.4  - Clindamycin x1  - Vancomycin started 2/15  - CT of extremity for foreign body completed  - Ortho consulted and evaluated patient     # HIV  # Hx of MRSA, nares swab 12/8/22 Neg   - PTA: Biktarvy   - Per note 1/25/23: \"Diagnosed 2015; previous therapy with Triumeq, Genvoya, now on Biktarvy. Has been undetectable for the last few years with relatively good adherence (though missing up to 10 doses per month)... HIV VL undetectable in 10/2022, his CD4 has been >200.\"     Neuro/Pain/Psych:  # Acute pain   - Prn: Ibuprofen, Ultram  - Per chart pt has allergy to fentanyl (blisters) and Tylenol (itching)     # DAVID  # MDD  # Methamphetamine abuse, in acute withdrawal   - started prn: hydroxyzine, melatonin, olanzapine ODT  - one time dose of Ativan given for CT, anxiety around pain in arm      GI/Nutrition:    # GERD  - continue PTA: Pepcid, .  - Regular Diet     Renal/ Fluids/Electrolytes:  # Hyponatremia   - Na: 132  - Creatinine: 0.71  - electrolyte replacement protocol in place.     Endocrine:  # Diabetes Mellitus II  - On Admission Glucose: 317   - Continue PTA medications: " Metformin   - High Resistance Sliding scale for glucose management. Goal to keep BG < 180 for optimal wound healing     Hematology:    # Anemia of chronic illness   - Hgb 11.5. Monitor and trend.   - Threshold for transfusion if hgb <7.0 or signs/symptoms of hypoperfusion.       Code status: Full Code    General Cares:  GI Prophylaxis: Pepcid  DVT Prophylaxis: pcd  Date of last stool/Bowel Regimen: in place  Pulmonary toilet: OOB, deep cough     ETOH: This patient was asked if in the last 3-6 months there has been a time when he had  5 or more drinks in a single day/outing.. Patient answer to the screening question was in the negative. No intervention needed.  Primary Care Physician   Chevy Allison PA-C  ED Observation Service   [facility]   [address], [place] [address] [phone]      Chief Complaint   Pain in left arm    History is obtained from the patient, electronic health record and emergency department physician    History of Present Illness   Andrew Collier is a 57 year old male who has a history significant for IV substance abuse arriving today to the emergency department evaluation of left wrist pain x1 day.  The patient reports injection of methamphetamines into the dorsal aspect of the left forearm yesterday.  Patient reports over the course the day developing increasing pain swelling over the injection site. Prior to admission he had no fever or chills.  No distal loss of sensation or strength.  Patient denies similar symptoms in the past.    After admission to Obs he developed chills at that time his Temp was 101.4, this was improved with ibuprofen. Patient has continued pain in his left wrist even after pain medications given, some is 2/2 anxiety about being in pain if he moves it.     Past Medical History    I have reviewed this patient's medical history and updated it with pertinent information if needed.   Past Medical History:    Diagnosis Date     Acute appendicitis with localized peritonitis 1/31/2018     AIDS (H)      Allergic rhinitis due to other allergen     DNS     Anal dysplasia      Chronic abdominal pain      CNS toxoplasmosis (H)      COVID-19 1/11/2022     Diabetes type 2, controlled (H)      GERD (gastroesophageal reflux disease)      Herpes zoster 9/23/2016     History of seizure      History of substance abuse (H)      HIV (human immunodeficiency virus infection) (H)      HLD (hyperlipidemia)      Lung nodules      Periungual wart      Pneumonia 1/6/2019     PTSD (post-traumatic stress disorder)      Sleep apnea     doesn't use CPAP       Past Surgical History   I have reviewed this patient's surgical history and updated it with pertinent information if needed.  Past Surgical History:   Procedure Laterality Date     ANOSCOPY N/A 9/9/2020    Procedure: Exam Under Anesthesia, ANOSCOPY, fulgeration of rectal fissures with Rectal Biopsies;  Surgeon: Thanh Lundberg MD;  Location: [facility] OR     COLONOSCOPY Left 1/22/2016    Procedure: COMBINED COLONOSCOPY, SINGLE OR MULTIPLE BIOPSY/POLYPECTOMY BY BIOPSY;  Surgeon: Clark Saini MD;  Location: [facility] GI     ESOPHAGOSCOPY, GASTROSCOPY, DUODENOSCOPY (EGD), COMBINED N/A 6/6/2022    Procedure: ESOPHAGOGASTRODUODENOSCOPY, WITH BIOPSY;  Surgeon: Kecia Benítez MD;  Location: [facility] GI     HC EXPLORE UNDESC TESTIS,INGUIN/SCROTAL       LAPAROSCOPIC APPENDECTOMY N/A 1/31/2018    Procedure: LAPAROSCOPIC APPENDECTOMY;  LAPAROSCOPIC APPENDECTOMY;  Surgeon: Dawn Holt MD;  Location: [facility]     LAPAROSCOPY DIAGNOSTIC (GENERAL) N/A 7/26/2016    Procedure: LAPAROSCOPY DIAGNOSTIC (GENERAL);  Surgeon: Susnanah Arriaga MD;  Location: [facility]     LAPAROSCOPY DIAGNOSTIC (GENERAL) N/A 4/16/2018    Procedure: LAPAROSCOPY DIAGNOSTIC (GENERAL);  Diagnostic laparoscopy and lysis of adhesions;  Surgeon: Prince Dowling MD;  Location: [facility] OR     OPTICAL TRACKING SYSTEM CRANIOTOMY, EXCISE  "TUMOR, COMBINED Left 4/10/2015    Procedure: COMBINED OPTICAL TRACKING SYSTEM CRANIOTOMY, EXCISE TUMOR;  Surgeon: Mirlande Colmenares MD;  Location: [facility] OR     REPAIR GAMEKEEPER'S THUMB Right 12/2/2016    Procedure: REPAIR LIGAMENT ULNAR COLLATERAL THUMB (GAMEKEEPER'S);  Surgeon: Evin Zamorano MD;  Location:  OR     [facility] NONSPECIFIC PROCEDURE      right forearm fracture     Prior to Admission Medications   Prior to Admission Medications   Prescriptions Last Dose Informant Patient Reported? Taking?   bictegravir-emtricitabine-tenofovir (BIKTARVY) -25 MG per tablet   No No   Sig: Take 1 tablet by mouth daily   blood glucose (NO BRAND SPECIFIED) lancets standard   No No   Sig: Use to test blood sugar 1 time daily or as directed.   blood glucose (NO BRAND SPECIFIED) test strip   No No   Sig: Use to test blood sugar 1 time daily or as directed.   blood glucose monitoring (NO BRAND SPECIFIED) meter device kit   No No   Sig: Use to test blood sugar 1 time daily or as directed.   famotidine (PEPCID) 40 MG tablet   No No   Sig: Take 1 tablet (40 mg) by mouth daily   metFORMIN (GLUCOPHAGE XR) 500 MG 24 hr tablet   No No   Sig: Take 3 tablets (1,500 mg) by mouth daily (with dinner) With food      Facility-Administered Medications: None     Allergies   Allergies   Allergen Reactions     Fentanyl Blisters     Per pt, after taking this medication had blisters develope     Tylenol [Acetaminophen] Itching     Dulaglutide Rash     Insulin Lispro Rash     Patient reported     Penicillin V Other (See Comments) and Rash     Diffuse maculopapular rash + feels \"high\", per pt.        Social History   Social History     Socioeconomic History     Marital status: Single     Spouse name: Not on file     Number of children: Not on file     Years of education: Not on file     Highest education level: Not on file   Occupational History     Occupation:      Comment: 5 years; temp agency     Occupation: Unemployed "   Tobacco Use     Smoking status: Some Days     Packs/day: 0.25     Years: 40.00     Pack years: 10.00     Types: Cigarettes     Smokeless tobacco: Former   Substance and Sexual Activity     Alcohol use: No     Alcohol/week: 0.0 standard drinks     Comment: Last etoh in 2007     Drug use: Not Currently     Types: Marijuana, Methamphetamines     Sexual activity: Not Currently     Partners: Female, Male     Comment: Last sexual activity 2008   Other Topics Concern     Parent/sibling w/ CABG, MI or angioplasty before 65F 55M? Not Asked   Social History Narrative    Born in [facility].  [place] the [place] in [place].  Last traveled to visit family in 2008.        1/7/2019 - Homeless. Working with a  at [employer] trying to get housing. Sexually active with two partners recently using condoms 100% of the time. Smokes occasionally, not for the last 4 weeks.        [facility]. 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  Care established with ID and endocrine     Social Determinants of Health     Financial Resource Strain: Not on file   Food Insecurity: Not on file   Transportation Needs: Not on file   Physical Activity: Not on file   Stress: Not on file   Social Connections: Not on file   Intimate Partner Violence: Not on file   Housing Stability: Not on file       Family History   Family history reviewed with patient and is noncontributory.    Review of Systems   CONSTITUTIONAL: Nfever, chills,fatigue   EYES: no visual blurring, no double vision or visual loss  ENT: no decrease in hearing, no tinnitus, no vertigo, no hoarseness  RESPIRATORY: no shortness of breath, no cough, no sputum   CARDIOVASCULAR: no palpitations, no chest  pain, no exertional chest pain or pressure  GASTROINTESTINAL: no nausea or vomiting, or abd pain  GENITOURINARY: no dysuria, no frequency or hesitancy, no hematuria  MUSCULOSKELETAL: redness,swelling, and joint pain,   SKIN: no rashes, ecchymoses, abrasions or  lacerations  NEUROLOGIC: no numbness or tingling of hands, no numbness or tingling  of feet, no syncope, no tremors or weakness  PSYCHIATRIC: no sleep disturbances, no anxiety or depression    Physical Exam   Temp: 98.8  F (37.1  C) Temp src: Oral BP: (!) 145/73 Pulse: 99   Resp: 18 SpO2: 96 % O2 Device: None (Room air)    Vital Signs with Ranges  Temp:  [98.2  F (36.8  C)-98.8  F (37.1  C)] 98.8  F (37.1  C)  Pulse:  [] 99  Resp:  [18-20] 18  BP: (121-145)/(69-73) 145/73  SpO2:  [96 %-100 %] 96 % 149 lbs 11.08 oz    General: alert, oriented to person, place, time  Head: atraumatic, normocephalic,  Eyes: PERRLA, pupils 3mm, EOMI, corneas and conjunctivae clear  Chest/Pulmonary: normal respiratory rate and rhythm,  bilateral clear breath sounds,   Cardiovascular: normal and regular rate and rhythm,   Abdomen: soft,no guarding, no distention   Musculoskel/Extremities: left wrist edema and erythema, limited exam pt refuses to move hand or arm    Skin: no rashes, laceration, ecchymosis, skin warm and dry.   Neuro: PERRLA, alert, oriented x 3.  Sensation intact.  Psychiatric: affect/mood normal, cooperative,   # Pain Assessment:  Current Pain Score 2/15/2023   Patient currently in pain? yes   Pain score (0-10) -   Pain location -   Pain descriptors -   - Andrew is experiencing pain due to cellulitis . Pain management was discussed and the plan was created in a collaborative fashion.  Andrew's response to the current recommendations: engaged  - Please see the plan for pain management as documented above        Data   Results for orders placed or performed during the hospital encounter of 02/15/23 (from the past 24 hour(s))   US Upper Extremity Non Vascular Left    Narrative    EXAMINATION: US UPPER EXTREMITY NON VASCULAR LEFT, 2/15/2023 8:48 AM     COMPARISON: None.    HISTORY: Distal left forearm swelling. Evaluate for abscess.    TECHNIQUE: Grayscale and limited color doppler imaging acquired in the  left distal  forearm in the region of swelling indicated by the  patient. Images acquired on the dorsal and ventral surfaces.    FINDINGS/    Impression    IMPRESSION: Subcutaneous edema is demonstrated in the distal left  forearm and wrist region without drainable fluid collection  identified. There may be some mild hyperemia which likely reflects the  cellulitis/induration present clinically in this region.    APRIL ELIZONDO MD         SYSTEM ID:  [id number]   CRP inflammation   Result Value Ref Range    CRP Inflammation 91.40 (H) <5.00 mg/L   CBC with platelets differential    Narrative    The following orders were created for panel order CBC with platelets differential.  Procedure                               Abnormality         Status                     ---------                               -----------         ------                     CBC with platelets and d...[[id number]]  Abnormal            Final result                 Please view results for these tests on the individual orders.   Basic metabolic panel   Result Value Ref Range    Sodium 132 (L) 136 - 145 mmol/L    Potassium 4.2 3.4 - 5.3 mmol/L    Chloride 97 (L) 98 - 107 mmol/L    Carbon Dioxide (CO2) 25 22 - 29 mmol/L    Anion Gap 10 7 - 15 mmol/L    Urea Nitrogen 27.7 (H) 8.0 - 23.0 mg/dL    Creatinine 0.71 0.67 - 1.17 mg/dL    Calcium 8.6 8.6 - 10.0 mg/dL    Glucose 317 (H) 70 - 99 mg/dL    GFR Estimate >90 >60 mL/min/1.73m2   CBC with platelets and differential   Result Value Ref Range    WBC Count 11.1 (H) 4.0 - 11.0 10e3/uL    RBC Count 4.04 (L) 4.40 - 5.90 10e6/uL    Hemoglobin 11.5 (L) 13.3 - 17.7 g/dL    Hematocrit 35.0 (L) 40.0 - 53.0 %    MCV 87 78 - 100 fL    MCH 28.5 26.5 - 33.0 pg    MCHC 32.9 31.5 - 36.5 g/dL    RDW 12.3 10.0 - 15.0 %    Platelet Count 339 150 - 450 10e3/uL    % Neutrophils 79 %    % Lymphocytes 11 %    % Monocytes 8 %    % Eosinophils 1 %    % Basophils 0 %    % Immature Granulocytes 1 %    NRBCs per 100 WBC 0 <1 /100     Absolute Neutrophils 8.8 (H) 1.6 - 8.3 10e3/uL    Absolute Lymphocytes 1.3 0.8 - 5.3 10e3/uL    Absolute Monocytes 0.8 0.0 - 1.3 10e3/uL    Absolute Eosinophils 0.1 0.0 - 0.7 10e3/uL    Absolute Basophils 0.0 0.0 - 0.2 10e3/uL    Absolute Immature Granulocytes 0.1 <=0.4 10e3/uL    Absolute NRBCs 0.0 10e3/uL   EKG 12-lead, complete   Result Value Ref Range    Systolic Blood Pressure  mmHg    Diastolic Blood Pressure  mmHg    Ventricular Rate 101 BPM    Atrial Rate 101 BPM    WY Interval 126 ms    QRS Duration 86 ms     ms    QTc 433 ms    P Axis 42 degrees    R AXIS -10 degrees    T Axis 11 degrees    Interpretation ECG       Sinus tachycardia  Otherwise normal ECG  When compared with ECG of 06-FEB-2023 19:37,  No significant change was found     CT Forearm Left w Contrast    Narrative    EXAM: CT FOREARM LEFT W CONTRAST  2/15/2023 2:18 PM      HISTORY: L distal / dorsal forearm/wrist infection ? necrotizng  infection    COMPARISON: None    TECHNIQUE: CT imaging of the left forearm after the administration of  IV contrast. Contrast: 92 cc Isovue-[id number]. Multiplanar reconstructions    FINDINGS:      images: Unremarkable    No acute osseous abnormality. Borderline ulnar negative variance. No  substantial degenerative change.    Subcutaneous edema dorsal to the distal forearm and visualized wrist.  No focal rim-enhancing fluid collection. Mild tenosynovial fluid at  the intersection of the second and third extensor compartments. No  subcutaneous emphysema.       Impression    IMPRESSION:     1. Dorsal subcutaneous edema at the level of the wrist and distal  forearm. No focal rim-enhancing fluid collection. No subcutaneous  emphysema.    2. Tenosynovitis at the intersection of the second and third extensor  compartments most substantially affecting the distal extensor carpi  radialis brevis. Infectious tenosynovitis is in the differential.    MARGIE GODWIN MD (Joe)         SYSTEM ID:  [id number]   Glucose by  meter   Result Value Ref Range    GLUCOSE BY METER POCT 240 (H) 70 - 99 mg/dL     *Note: Due to a large number of results and/or encounters for the requested time period, some results have not been displayed. A complete set of results can be found in Results Review.       Studies:  US Upper Extremity Non Vascular Left   Final Result   IMPRESSION: Subcutaneous edema is demonstrated in the distal left   forearm and wrist region without drainable fluid collection   identified. There may be some mild hyperemia which likely reflects the   cellulitis/induration present clinically in this region.      APRIL ELIZONDO MD            SYSTEM ID:  [id number]

## 2023-02-15 NOTE — PROGRESS NOTES
10:27 AM ED Observation admission/progress note.    HPI this is a 57-year-old male who comes in complaining of pain and swelling to the dorsum of the left wrist.  He has a history of IV drug abuse and has used this location for injection of methamphetamine.  Last use was yesterday.  He was seen in the emergency department and referred to observation for admission IV antibiotics and hand service consultation.    Past Medical History:   Diagnosis Date     Acute appendicitis with localized peritonitis 1/31/2018     AIDS (H)      Allergic rhinitis due to other allergen     DNS     Anal dysplasia      Chronic abdominal pain      CNS toxoplasmosis (H)      COVID-19 1/11/2022     Diabetes type 2, controlled (H)      GERD (gastroesophageal reflux disease)      Herpes zoster 9/23/2016     History of seizure      History of substance abuse (H)      HIV (human immunodeficiency virus infection) (H)      HLD (hyperlipidemia)      Lung nodules      Periungual wart      Pneumonia 1/6/2019     PTSD (post-traumatic stress disorder)      Sleep apnea     doesn't use CPAP     Social History     Socioeconomic History     Marital status: Single     Spouse name: Not on file     Number of children: Not on file     Years of education: Not on file     Highest education level: Not on file   Occupational History     Occupation:      Comment: 5 years; temp agency     Occupation: Unemployed   Tobacco Use     Smoking status: Some Days     Packs/day: 0.25     Years: 40.00     Pack years: 10.00     Types: Cigarettes     Smokeless tobacco: Former   Substance and Sexual Activity     Alcohol use: No     Alcohol/week: 0.0 standard drinks     Comment: Last etoh in 2007     Drug use: Not Currently     Types: Marijuana, Methamphetamines     Sexual activity: Not Currently     Partners: Female, Male     Comment: Last sexual activity 2008   Other Topics Concern     Parent/sibling w/ CABG, MI or angioplasty before 65F 55M? Not Asked   Social  History Narrative    Born in [facility] [place].  [place]  Last traveled to visit family in 2008.        1/7/2019 - Homeless. Working with a  at [employer] trying to get housing. Sexually active with two partners recently using condoms 100% of the time. Smokes occasionally, not for the last 4 weeks.        1/7/2020 at [facility] since 1/1/2020. 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  Care established with ID and endocrine     Social Determinants of Health     Financial Resource Strain: Not on file   Food Insecurity: Not on file   Transportation Needs: Not on file   Physical Activity: Not on file   Stress: Not on file   Social Connections: Not on file   Intimate Partner Violence: Not on file   Housing Stability: Not on file     Physical examination:  General: He is agitated appears to be under the influence of methamphetamine.  Is resisting examination.  Extremities: Examination of the left wrist is challenging however he has obvious swelling tenderness and erythema to the dorsum of the wrist.  He has a metal bracelet on that I asked him to remove which he declined.  He does not appear to have extensor tenosynovitis.  Volar aspect of wrist is unaffected.    Assessment and plan: Ultrasound to rule out drainable abscess, admission to ED observation for IV antibiotics.  Appreciate orthopedic hand consult and recommendations.  Patient would benefit from gentle immobilization.  Anticipate likely discharge home tomorrow.  He certainly should get into or back into treatment.  We will recommend Rule 25 evaluation at discharge.

## 2023-02-15 NOTE — ED NOTES
Bed: [facility]  Expected date:   Expected time:   Means of arrival:   Comments:  3 Hallway appropriate

## 2023-02-15 NOTE — CARE PLAN
Orthopedics Plan of Care    Received page for ASAP consult for this patient. I called and spoke with Dr. Kofi Esparza who stated consult is NOT ASAP, but is routine and non-urgent. Patient with 12 hour history of wrist pain and redness after injection of IV drugs. US completed and shows no drainable fluid collection. Labs are currently in process and patient vitally stable. Being admitted to Observation unit after initiation of antibiotics (IV vancomycin and po clindamycin). Currently Dr. Esparza does not have concern for compartment syndrome, tenosynovitis or deep infection.     Discussed with ED doctor that we will plan to see the patient today on non-urgent basis. He will re-page if there are more urgent concerns.       Ana Laura Dior PA-C  2/15/2023 9:50 AM  [facility]  Orthopaedic Surgery

## 2023-02-15 NOTE — CONSULTS
Orthopaedic Surgery Consultation    DATE OF CONSULT: 2/15/2023 2:42 PM    REQUESTING PROVIDER: Kofi Esparza MD - [facility] ED Staff.    CC: left wrist pain/swelinlg    DATE OF ADMISSION: 2/15/2023    HISTORY OF PRESENT ILLNESS:   The orthopaedic surgery service was consulted by Dr. Kofi Esparza for evaluation and treatment recommendations of left wrist pain/swelling. .    Andrew Collier is a 57 year old right-hand dominant male who presented to the ED earlier this AM for left wrist pain/swelling. Pain is over the dorsal wrist at site of recent methamphetamine injection. He was afebrile on admission, vitally stable, but has since developed temp of 101.3. He has history of right wrist fracture 2 years ago that healed without need for surgery or any lasting complication.     Pain:  Onset: 12-18 hours  Location: left wrist  Quality: Sharp/Aching  Alleviating: Rest  Exacerbating: Movement, direct palpation  Associated Symptoms: No associated numbness/tingling.      Denies numbness, tingling, or weakness to the affected extremities.  Denies vomiting, diarrhea, constipation, chest pain, shortness of breath. Notes onset of feeling feverish/chills.     PAST MEDICAL HISTORY:   Past Medical History:   Diagnosis Date     Acute appendicitis with localized peritonitis 1/31/2018     AIDS (H)      Allergic rhinitis due to other allergen     DNS     Anal dysplasia      Chronic abdominal pain      CNS toxoplasmosis (H)      COVID-19 1/11/2022     Diabetes type 2, controlled (H)      GERD (gastroesophageal reflux disease)      Herpes zoster 9/23/2016     History of seizure      History of substance abuse (H)      HIV (human immunodeficiency virus infection) (H)      HLD (hyperlipidemia)      Lung nodules      Periungual wart      Pneumonia 1/6/2019     PTSD (post-traumatic stress disorder)      Sleep apnea     doesn't use CPAP       PAST SURGICAL HISTORY:    Past Surgical History:   Procedure Laterality Date     ANOSCOPY N/A  9/9/2020    Procedure: Exam Under Anesthesia, ANOSCOPY, fulgeration of rectal fissures with Rectal Biopsies;  Surgeon: Thanh Lundberg MD;  [facility]     COLONOSCOPY Left 1/22/2016    Procedure: COMBINED COLONOSCOPY, SINGLE OR MULTIPLE BIOPSY/POLYPECTOMY BY BIOPSY;  Surgeon: Clark Saini MD;  [facility]     ESOPHAGOSCOPY, GASTROSCOPY, DUODENOSCOPY (EGD), COMBINED N/A 6/6/2022    Procedure: ESOPHAGOGASTRODUODENOSCOPY, WITH BIOPSY;  Surgeon: Kecia Benítez MD;  Location: [facility] GI     HC EXPLORE UNDESC TESTIS,INGUIN/SCROTAL       LAPAROSCOPIC APPENDECTOMY N/A 1/31/2018    Procedure: LAPAROSCOPIC APPENDECTOMY;  LAPAROSCOPIC APPENDECTOMY;  Surgeon: Dawn Holt MD;  [facility]     LAPAROSCOPY DIAGNOSTIC (GENERAL) N/A 7/26/2016    Procedure: LAPAROSCOPY DIAGNOSTIC (GENERAL);  Surgeon: Susannah Arriaga MD;  [facility]     LAPAROSCOPY DIAGNOSTIC (GENERAL) N/A 4/16/2018    Procedure: LAPAROSCOPY DIAGNOSTIC (GENERAL);  Diagnostic laparoscopy and lysis of adhesions;  Surgeon: Prince Dowling MD;  [facility]     OPTICAL TRACKING SYSTEM CRANIOTOMY, EXCISE TUMOR, COMBINED Left 4/10/2015    Procedure: COMBINED OPTICAL TRACKING SYSTEM CRANIOTOMY, EXCISE TUMOR;  Surgeon: Mirlande Colmenares MD;  [facility]     REPAIR GAMEKEEPER'S THUMB Right 12/2/2016    Procedure: REPAIR LIGAMENT ULNAR COLLATERAL THUMB (GAMEKEEPER'S);  Surgeon: Evin Zamorano MD;  [facility]     ZZC NONSPECIFIC PROCEDURE      right forearm fracture       MEDICATIONS:     Prior to Admission medications    Medication Sig Last Dose Taking? Auth Provider Long Term End Date   bictegravir-emtricitabine-tenofovir (BIKTARVY) -25 MG per tablet Take 1 tablet by mouth daily   Anna Dailey MD     blood glucose (NO BRAND SPECIFIED) lancets standard Use to test blood sugar 1 time daily or as directed.   Anna Dailey MD     blood glucose (NO BRAND SPECIFIED) test strip Use  to test blood sugar 1 time daily or as directed.   Anna Dailey MD     blood glucose monitoring (NO BRAND SPECIFIED) meter device kit Use to test blood sugar 1 time daily or as directed.   Anna Dailey MD     famotidine (PEPCID) 40 MG tablet Take 1 tablet (40 mg) by mouth daily   Anna Dailey MD No    metFORMIN (GLUCOPHAGE XR) 500 MG 24 hr tablet Take 3 tablets (1,500 mg) by mouth daily (with dinner) With food   Anna Dailey MD Yes    NOVOLOG FLEXPEN 100 UNIT/ML soln Inject 5 Units Subcutaneous 3 times daily (with meals)   [place], [place], DO Yes 5/29/22       ALLERGIES:   Fentanyl, Tylenol [acetaminophen], Dulaglutide, Insulin lispro, and Penicillin v    SOCIAL HISTORY:   Social History     Socioeconomic History     Marital status: Single     Spouse name: Not on file     Number of children: Not on file     Years of education: Not on file     Highest education level: Not on file   Occupational History     Occupation:      Comment: 5 years; temp agency     Occupation: Unemployed   Tobacco Use     Smoking status: Some Days     Packs/day: 0.25     Years: 40.00     Pack years: 10.00     Types: Cigarettes     Smokeless tobacco: Former   Substance and Sexual Activity     Alcohol use: No     Alcohol/week: 0.0 standard drinks     Comment: Last etoh in 2007     Drug use: Not Currently     Types: Marijuana, Methamphetamines     Sexual activity: Not Currently     Partners: Female, Male     Comment: Last sexual activity 2008   Other Topics Concern     Parent/sibling w/ CABG, MI or angioplasty before 65F 55M? Not Asked   Social History Narrative    Born in [facility].  Came to the [place] in [place].  Last traveled to visit family in 2008.        1/7/2019 - Homeless. Working with a  at [employer] trying to get housing. Sexually active with two partners recently using condoms 100% of the time. Smokes occasionally, not for the last 4 weeks.        1/7/2020 at [place]  [facility] since 1/1/2020. 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  Care established with ID and endocrine     Social Determinants of Health     Financial Resource Strain: Not on file   Food Insecurity: Not on file   Transportation Needs: Not on file   Physical Activity: Not on file   Stress: Not on file   Social Connections: Not on file   Intimate Partner Violence: Not on file   Housing Stability: Not on file     Illicit Drugs: IV drug use, methamphetamines    FAMILY HISTORY:  Family History   Problem Relation Age of Onset     Diabetes Brother      Diabetes Father      Alzheimer Disease Father      Unknown/Adopted Mother      Diabetes Paternal Grandfather      Cancer No family hx of         no skin cancer     Skin Cancer No family hx of         no famiy hx of skin cancer     Glaucoma No family hx of      Macular Degeneration No family hx of          REVIEW OF SYSTEMS:   Otherwise a 10-point reviews of systems was negative except as noted above in the HPI.       PHYSICAL EXAM:   Vitals:    02/15/23 0735 02/15/23 1102 02/15/23 1231 02/15/23 1438   BP: 121/69 (!) 145/73     BP Location:  Right arm     Patient Position:  Sitting     Pulse: 110 99     Resp: 20 18     Temp: 98.2  F (36.8  C) 98.8  F (37.1  C) (!) 101.3  F (38.5  C) 100.4  F (38  C)   TempSrc: Temporal Oral Oral Oral   SpO2: 100% 96%     Weight:  67.9 kg (149 lb 11.1 oz)     Height:  1.6 m (5' 3\")       General: Awake, alert, appropriate, following commands, NAD.  Neuro: EOM grossly intact  Skin: No rashes,  skin color normal.  HEENT: head normocephalic, atraumatic. Normal.   Lungs: Breathing comfortably and nonlabored, no wheezes or stridor noted.      Right Upper Extremity:   - No gross deformity, skin intact.  - No significant tenderness to palpation over sternoclavicular joint, clavicle, acromioclavicular joint, shoulder, arm, elbow, forearm, wrist, hand, fingers.  - No pain with ROM shoulder, elbow, wrist or fingers. .  - Motor intact distally deltoid, FPL, EPL, IO " with 5/5 strength.  - SILT median, ulnar, radial, axillary nerve distributions.  - Radial pulse palpable, fingers warm and well perfused.    Left Upper Extremity:   - No gross deformity, skin intact.  - Localized swelling surrounding dorsal radial injection site with mild erythema. No tracking erythema in the dorsal hand or proximal arm.   - Forearm compartments all soft and nontender. Tenderness localized over distal radial forearm site of swelling. No swelling in the hand or fingers.   - Patient refused any active or attempt at PROM of the wrist or fingers. Yells in pain with even mild ROM of the fingers.  - No significant tenderness to palpation over sternoclavicular joint, clavicle, acromioclavicular joint, shoulder, upper arm, elbow, proximal forearm. No point tenderness of any of the fingers, palmar hand.   - Unable to assess motor strength due to patient refusal/pain  - SILT median, ulnar, radial, axillary nerve distributions.  - Radial pulse palpable, fingers warm and well perfused.          LABS:    INR   Date Value Ref Range Status   02/06/2023 0.98 0.85 - 1.15 Final   12/19/2020 1.00 0.86 - 1.14 Final       CBC:  Lab Results   Component Value Date    WBC 11.1 (H) 02/15/2023    HGB 11.5 (L) 02/15/2023     02/15/2023     Hemoglobin   Date Value Ref Range Status   02/15/2023 11.5 (L) 13.3 - 17.7 g/dL Final   02/06/2023 16.1 13.3 - 17.7 g/dL Final   06/24/2021 13.5 13.3 - 17.7 g/dL Final   06/15/2021 14.3 13.3 - 17.7 g/dL Final       BMP:  Lab Results   Component Value Date     (L) 02/15/2023    POTASSIUM 4.2 02/15/2023    CHLORIDE 97 (L) 02/15/2023    CO2 25 02/15/2023    BUN 27.7 (H) 02/15/2023    CR 0.71 02/15/2023    ANIONGAP 10 02/15/2023    LINDA 8.6 02/15/2023     (H) 02/15/2023       Inflammatory Markers:  Lab Results   Component Value Date    WBC 11.1 (H) 02/15/2023    CRP 5.6 02/19/2022    SED 10 12/19/2020       Cultures:  No results for input(s): CULT in the last 168  hours.    IMAGING:  EXAMINATION: US UPPER EXTREMITY NON VASCULAR LEFT, 2/15/2023 8:48 AM   COMPARISON: None.     HISTORY: Distal left forearm swelling. Evaluate for abscess.     TECHNIQUE: Grayscale and limited color doppler imaging acquired in the  left distal forearm in the region of swelling indicated by the  patient. Images acquired on the dorsal and ventral surfaces.                                                                   IMPRESSION: Subcutaneous edema is demonstrated in the distal left  forearm and wrist region without drainable fluid collection  identified. There may be some mild hyperemia which likely reflects the  cellulitis/induration present clinically in this region.     APRIL ELIZONDO MD     IMPRESSION:   Andrew Collier is a 57 year old male with PMH significant for IVDU, HIV (on Biktarvy), uncontrolled DMII (recent HgA1C 12.8 12/22), HLD, GERD, recent admission for small bowel obstruction 2/6-2/8 who is admitted to Observation unit from ED for Left wrist pain/swelling at site of methamphetamine injection.        RECOMMENDATIONS:   - LNREC score of 4 currently so low suspicion for necrotizing fasciitis. However, if pain doesn't improve with IV antibiotics, consider repeating CRP, CBC and CMP later today to reevaluate score. Also discussed with ED staff that may be beneficial to obtain forearm CT to rule-out abscess formation/early presentation of necrotizing fasciitis. He placed order for this and Ortho will await imaging to review.   -Currently, patient refusing ROM exam of the wrist. No swelling in the proximal forearm, palmar or volar hand or fingers. Compartments are soft in all of forearm so low suspicion for compartment syndrome at this time. Upon presentation to ED, patient was vitally stable and afebrile, so low suspicion for septic arthritis, but given refusal to do any ROM of hand/wrist, may benefit from serial evaluations to rule this out.    - Anticoagulation/DVT  Prophylaxis: per primary-prefer options quickly reversible if needed in case of need for surgery  - Antibiotics/Tetanus: agree with course of IV antibiotics for cellulitis, currently vancomycin.   - X-rays/Imaging: CT forearm pending.   - Activity: ROMAT  - Weight bearing: WBAT   - Pain control: per ED observation team  - Diet: please keep npo until Ortho can review CT scan  - Disposition: pending response to antibiotics and repeat evaluation.       Assessment and Plan discussed with Dr. Sandi Glover PGY 4 and Dr. Christiano Vaz, Orthopedic Surgery attending.     Ana Laura Dior PA-C  2/15/2023 2:42 PM  Orthopaedic Surgery     Thank you for allowing me to participate in this patient's care. Please page me directly any questions/concerns.   Securely message with the Vocera Web Console (learn more here)  Text page via [employer] Paging/Directory    If there is no response, if it is a weekend, or if it is during evening hours, please page the orthopaedic surgery resident on call via [employer] Paging/Directory

## 2023-02-15 NOTE — ED PROVIDER NOTES
ED Provider Note  [facility]      History     Chief Complaint   Patient presents with     Wrist Pain     HPI  Andrew Collier is a 57 year old male who has a history significant for substance abuse arriving today to the emergency department evaluation of left wrist pain x1 day.  The patient reports injection of methamphetamines into the dorsal aspect of the left forearm yesterday.  Patient reports over the course the day developing increasing pain swelling over the injection site.  No development of fever or chills.  No distal loss of sensation or strength.  Patient denies similar symptoms in the past.    Past Medical History  Past Medical History:   Diagnosis Date     Acute appendicitis with localized peritonitis 1/31/2018     AIDS (H)      Allergic rhinitis due to other allergen     DNS     Anal dysplasia      Chronic abdominal pain      CNS toxoplasmosis (H)      COVID-19 1/11/2022     Diabetes type 2, controlled (H)      GERD (gastroesophageal reflux disease)      Herpes zoster 9/23/2016     History of seizure      History of substance abuse (H)      HIV (human immunodeficiency virus infection) (H)      HLD (hyperlipidemia)      Lung nodules      Periungual wart      Pneumonia 1/6/2019     PTSD (post-traumatic stress disorder)      Sleep apnea     doesn't use CPAP     Past Surgical History:   Procedure Laterality Date     ANOSCOPY N/A 9/9/2020    Procedure: Exam Under Anesthesia, ANOSCOPY, fulgeration of rectal fissures with Rectal Biopsies;  Surgeon: Thanh Lundberg MD;  [facility]     COLONOSCOPY Left 1/22/2016    Procedure: COMBINED COLONOSCOPY, SINGLE OR MULTIPLE BIOPSY/POLYPECTOMY BY BIOPSY;  Surgeon: Clark Saini MD;  [facility]     ESOPHAGOSCOPY, GASTROSCOPY, DUODENOSCOPY (EGD), COMBINED N/A 6/6/2022    Procedure: ESOPHAGOGASTRODUODENOSCOPY, WITH BIOPSY;  Surgeon: Kecia Benítez MD;  Location: [facility]     HC EXPLORE UNDESC TESTIS,INGUIN/SCROTAL        "LAPAROSCOPIC APPENDECTOMY N/A 1/31/2018    Procedure: LAPAROSCOPIC APPENDECTOMY;  LAPAROSCOPIC APPENDECTOMY;  Surgeon: Dawn Holt MD;  [facility]     LAPAROSCOPY DIAGNOSTIC (GENERAL) N/A 7/26/2016    Procedure: LAPAROSCOPY DIAGNOSTIC (GENERAL);  Surgeon: Susannah Arriaga MD;  [facility]     LAPAROSCOPY DIAGNOSTIC (GENERAL) N/A 4/16/2018    Procedure: LAPAROSCOPY DIAGNOSTIC (GENERAL);  Diagnostic laparoscopy and lysis of adhesions;  Surgeon: Prince Dowling MD;  [facility]     OPTICAL TRACKING SYSTEM CRANIOTOMY, EXCISE TUMOR, COMBINED Left 4/10/2015    Procedure: COMBINED OPTICAL TRACKING SYSTEM CRANIOTOMY, EXCISE TUMOR;  Surgeon: Mirlande Colmenares MD;  [facility]     REPAIR GAMEKEEPER'S THUMB Right 12/2/2016    Procedure: REPAIR LIGAMENT ULNAR COLLATERAL THUMB (GAMEKEEPER'S);  Surgeon: Evin Zamorano MD;  [facility]     ZZC NONSPECIFIC PROCEDURE      right forearm fracture     bictegravir-emtricitabine-tenofovir (BIKTARVY) -25 MG per tablet  blood glucose (NO BRAND SPECIFIED) lancets standard  blood glucose (NO BRAND SPECIFIED) test strip  blood glucose monitoring (NO BRAND SPECIFIED) meter device kit  famotidine (PEPCID) 40 MG tablet  metFORMIN (GLUCOPHAGE XR) 500 MG 24 hr tablet      Allergies   Allergen Reactions     Fentanyl Blisters     Per pt, after taking this medication had blisters develope     Tylenol [Acetaminophen] Itching     Dulaglutide Rash     Insulin Lispro Rash     Patient reported     Penicillin V Other (See Comments) and Rash     Diffuse maculopapular rash + feels \"high\", per pt.      Family History  Family History   Problem Relation Age of Onset     Diabetes Brother      Diabetes Father      Alzheimer Disease Father      Unknown/Adopted Mother      Diabetes Paternal Grandfather      Cancer No family hx of         no skin cancer     Skin Cancer No family hx of         no famiy hx of skin cancer     Glaucoma No family hx of      " Macular Degeneration No family hx of      Social History   Social History     Tobacco Use     Smoking status: Some Days     Packs/day: 0.25     Years: 40.00     Pack years: 10.00     Types: Cigarettes     Smokeless tobacco: Former   Substance Use Topics     Alcohol use: No     Alcohol/week: 0.0 standard drinks     Comment: Last etoh in 2007     Drug use: Not Currently     Types: Marijuana, Methamphetamines      Past medical history, past surgical history, medications, allergies, family history, and social history were reviewed with the patient. No additional pertinent items.      A complete review of systems was performed with pertinent positives and negatives noted in the HPI, and all other systems negative.    Physical Exam   BP: 121/69  Pulse: 110  Temp: 98.2  F (36.8  C)  Resp: 20  SpO2: 100 %  Physical Exam  Vitals and nursing note reviewed.   Constitutional:       General: He is in acute distress.   Cardiovascular:      Rate and Rhythm: Tachycardia present.   Musculoskeletal:        Arms:       Comments: No gross deformity or open wound.  Mild/moderate swelling, tenderness with palpation and overlying erythema.  Range of motion limited secondary to pain in fingers.  No proximal erythema or streaking.   Skin:     Capillary Refill: Capillary refill takes less than 2 seconds.      Findings: No rash.   Neurological:      Mental Status: He is alert.      Sensory: No sensory deficit.      Motor: No weakness.   Psychiatric:         Mood and Affect: Mood normal.         Behavior: Behavior normal.         ED Course, Procedures, & Data      Procedures                     Results for orders placed or performed during the hospital encounter of 02/15/23   US Upper Extremity Non Vascular Left     Status: None    Narrative    EXAMINATION: US UPPER EXTREMITY NON VASCULAR LEFT, 2/15/2023 8:48 AM     COMPARISON: None.    HISTORY: Distal left forearm swelling. Evaluate for abscess.    TECHNIQUE: Grayscale and limited color  doppler imaging acquired in the  left distal forearm in the region of swelling indicated by the  patient. Images acquired on the dorsal and ventral surfaces.    FINDINGS/    Impression    IMPRESSION: Subcutaneous edema is demonstrated in the distal left  forearm and wrist region without drainable fluid collection  identified. There may be some mild hyperemia which likely reflects the  cellulitis/induration present clinically in this region.    APRIL ELIZONDO MD         SYSTEM ID:  [id number]     Medications   0.9% sodium chloride BOLUS (has no administration in time range)   vancomycin (VANCOCIN) 1,500 mg in 0.9% NaCl 250 mL intermittent infusion (has no administration in time range)   pharmacy alert - intermittent dosing (has no administration in time range)   oxyCODONE IR (ROXICODONE) tablet 10 mg (10 mg Oral Given 2/15/23 0819)   clindamycin (CLEOCIN) capsule 300 mg (300 mg Oral Given 2/15/23 0818)     Labs Ordered and Resulted from Time of ED Arrival to Time of ED Departure - No data to display  US Upper Extremity Non Vascular Left   Final Result   IMPRESSION: Subcutaneous edema is demonstrated in the distal left   forearm and wrist region without drainable fluid collection   identified. There may be some mild hyperemia which likely reflects the   cellulitis/induration present clinically in this region.      APRIL ELIZONDO MD            SYSTEM ID:  [id number]             Medical Decision Making  The patient's presentation is strongly suggestive of an acute complicated injury.    The patient's evaluation involved:  an assessment requiring an independent historian (see separate area of note for details)  ordering and/or review of 2 test(s) in this encounter (see separate area of note for details)  review of 2 test result(s) ordered prior to this encounter (see separate area of note for details)    The patient's management involved a decision regarding hospitalization.      Assessment & Plan      Andrew Lockwood  Nando is a 57 year old male who has a history significant for substance abuse arriving today to the emergency department evaluation of left wrist pain x1 day.  On arrival patient to be alert.  Is presently afebrile lying supine in bed.  He has mild tachycardia.  External evaluation the wrist demonstrates no gross deformity.  Distal neurovascular examination normal.  There is faint erythema and swelling over the dorsal aspect of the distal left forearm concerning for developing infection.  We discussed other possibilities such as hematoma with lower suspicion for this versus patient getting into joint space or development of synovial fluid leak.  Again lower suspicion for this.  I did perform bedside ultrasonography what appears to reveal early fluid development/collection concerning for infection.  He would benefit from antibiotics and definitive ultrasonography with possible need for splinting and/or hand surgery consultation.    Formal ultrasonography consistent with my examination demonstrate some fluid but no obvious drainable collection.  Concern at this time for possible progressing infection warranting need for IV antibiotics.  I do believe he benefit from ED observation stay for close clinical assessment to ensure positive trajectory of symptoms.  The patient did receive clindamycin and vancomycin.    I have reviewed the nursing notes. I have reviewed the findings, diagnosis, plan and need for follow up with the patient.    New Prescriptions    No medications on file       Final diagnoses:   Cellulitis of left wrist       Kofi Esparza  [facility] EMERGENCY DEPARTMENT  2/15/2023     Kofi Esparza MD  02/15/23 1015

## 2023-02-15 NOTE — ED TRIAGE NOTES
Pt arrives ambulatory to ED  States that he was shooting meth in his left wrist yesterday morning and at 8pm last night his wrist began to hurt and became very swollen.

## 2023-02-15 NOTE — PHARMACY-VANCOMYCIN DOSING SERVICE
Pharmacy Vancomycin Initial Note  Date of Service February 15, 2023  Patient's  1965  57 year old, male    Indication: Skin and Soft Tissue Infection    Current estimated CrCl = Estimated Creatinine Clearance: 107.6 mL/min (based on SCr of 0.71 mg/dL).    Creatinine for last 3 days  2/15/2023: 10:49 AM Creatinine 0.71 mg/dL    Recent Vancomycin Level(s) for last 3 days  No results found for requested labs within last 72 hours.      Vancomycin IV Administrations (past 72 hours)                   vancomycin (VANCOCIN) 1,500 mg in 0.9% NaCl 250 mL intermittent infusion (mg) 1,500 mg New Bag 02/15/23 1050                Nephrotoxins and other renal medications (From now, onward)    Start     Dose/Rate Route Frequency Ordered Stop    02/15/23 2300  vancomycin (VANCOCIN) 1,250 mg in 0.9% NaCl 250 mL intermittent infusion         1,250 mg  over 90 Minutes Intravenous EVERY 12 HOURS 02/15/23 1221      02/15/23 1059  ibuprofen (ADVIL/MOTRIN) tablet 600 mg         600 mg Oral EVERY 6 HOURS PRN 02/15/23 1101            Contrast Orders - past 72 hours (72h ago, onward)    None        [employer] Prediction of Planned Initial Vancomycin Regimen  Loading dose: 1500 mg IV once at 10:50 on 2/15/23  Regimen: 1250 mg IV every 12 hours.  Start time: 22:50 on 02/15/2023  Exposure target: AUC24 (range)400-600 mg/L.hr   AUC24,ss: 589 mg/L.hr  Probability of AUC24 > 400: 85 %  Ctrough,ss: 17.6 mg/L  Probability of Ctrough,ss > 20: 40 %  Probability of nephrotoxicity (Lodise CISCO ): 13 %        Plan:  1. Start vancomycin 1500 mg IV once followed by 1250 mg IV q12h.   2. Vancomycin monitoring method: AUC  3. Vancomycin therapeutic monitoring goal: 400-600 mg*h/L  4. Pharmacy will check vancomycin levels as appropriate in 1-3 Days.  5. Serum creatinine levels will be ordered daily for the first week of therapy and at least twice weekly for subsequent weeks.      Sandra Grajeda [facility]

## 2023-02-15 NOTE — PLAN OF CARE
Goal Outcome Evaluation:    -diagnostic tests and consults completed and resulted: not met    -vital signs normal or at patient baseline: not met   -tolerating oral intake to maintain hydration: met   -adequate pain control on oral analgesics: not met    -tolerating oral antibiotics or has plans for home infusion setup: not met    -infection is improving: not met

## 2023-02-16 PROBLEM — L03.114 CELLULITIS OF LEFT WRIST: Status: ACTIVE | Noted: 2023-02-16

## 2023-02-16 LAB
ANION GAP SERPL CALCULATED.3IONS-SCNC: 10 MMOL/L (ref 7–15)
ATRIAL RATE - MUSE: 101 BPM
BUN SERPL-MCNC: 9.4 MG/DL (ref 8–23)
CALCIUM SERPL-MCNC: 8.4 MG/DL (ref 8.6–10)
CHLORIDE SERPL-SCNC: 98 MMOL/L (ref 98–107)
CREAT SERPL-MCNC: 0.61 MG/DL (ref 0.67–1.17)
CRP SERPL-MCNC: 197 MG/L
DEPRECATED HCO3 PLAS-SCNC: 21 MMOL/L (ref 22–29)
DIASTOLIC BLOOD PRESSURE - MUSE: NORMAL MMHG
ERYTHROCYTE [DISTWIDTH] IN BLOOD BY AUTOMATED COUNT: 12.4 % (ref 10–15)
GFR SERPL CREATININE-BSD FRML MDRD: >90 ML/MIN/1.73M2
GLUCOSE BLDC GLUCOMTR-MCNC: 194 MG/DL (ref 70–99)
GLUCOSE BLDC GLUCOMTR-MCNC: 201 MG/DL (ref 70–99)
GLUCOSE BLDC GLUCOMTR-MCNC: 263 MG/DL (ref 70–99)
GLUCOSE BLDC GLUCOMTR-MCNC: 284 MG/DL (ref 70–99)
GLUCOSE BLDC GLUCOMTR-MCNC: 310 MG/DL (ref 70–99)
GLUCOSE BLDC GLUCOMTR-MCNC: 371 MG/DL (ref 70–99)
GLUCOSE SERPL-MCNC: 297 MG/DL (ref 70–99)
HCT VFR BLD AUTO: 32.8 % (ref 40–53)
HGB BLD-MCNC: 10.9 G/DL (ref 13.3–17.7)
INTERPRETATION ECG - MUSE: NORMAL
LACTATE SERPL-SCNC: 0.5 MMOL/L (ref 0.7–2)
MCH RBC QN AUTO: 29.2 PG (ref 26.5–33)
MCHC RBC AUTO-ENTMCNC: 33.2 G/DL (ref 31.5–36.5)
MCV RBC AUTO: 88 FL (ref 78–100)
P AXIS - MUSE: 42 DEGREES
PLATELET # BLD AUTO: 327 10E3/UL (ref 150–450)
POTASSIUM SERPL-SCNC: 3.6 MMOL/L (ref 3.4–5.3)
PR INTERVAL - MUSE: 126 MS
QRS DURATION - MUSE: 86 MS
QT - MUSE: 334 MS
QTC - MUSE: 433 MS
R AXIS - MUSE: -10 DEGREES
RBC # BLD AUTO: 3.73 10E6/UL (ref 4.4–5.9)
SODIUM SERPL-SCNC: 129 MMOL/L (ref 136–145)
SODIUM SERPL-SCNC: 135 MMOL/L (ref 136–145)
SYSTOLIC BLOOD PRESSURE - MUSE: NORMAL MMHG
T AXIS - MUSE: 11 DEGREES
VENTRICULAR RATE- MUSE: 101 BPM
WBC # BLD AUTO: 12.1 10E3/UL (ref 4–11)

## 2023-02-16 PROCEDURE — G0378 HOSPITAL OBSERVATION PER HR: HCPCS

## 2023-02-16 PROCEDURE — 99231 SBSQ HOSP IP/OBS SF/LOW 25: CPT | Performed by: EMERGENCY MEDICINE

## 2023-02-16 PROCEDURE — 250N000011 HC RX IP 250 OP 636: Performed by: PHYSICIAN ASSISTANT

## 2023-02-16 PROCEDURE — 250N000013 HC RX MED GY IP 250 OP 250 PS 637: Performed by: PHYSICIAN ASSISTANT

## 2023-02-16 PROCEDURE — 85027 COMPLETE CBC AUTOMATED: CPT | Performed by: PHYSICIAN ASSISTANT

## 2023-02-16 PROCEDURE — 99207 PR APP CREDIT; MD BILLING SHARED VISIT: CPT | Performed by: NURSE PRACTITIONER

## 2023-02-16 PROCEDURE — 83605 ASSAY OF LACTIC ACID: CPT | Performed by: PHYSICIAN ASSISTANT

## 2023-02-16 PROCEDURE — 258N000003 HC RX IP 258 OP 636: Performed by: EMERGENCY MEDICINE

## 2023-02-16 PROCEDURE — 36415 COLL VENOUS BLD VENIPUNCTURE: CPT | Performed by: PHYSICIAN ASSISTANT

## 2023-02-16 PROCEDURE — 80048 BASIC METABOLIC PNL TOTAL CA: CPT | Performed by: PHYSICIAN ASSISTANT

## 2023-02-16 PROCEDURE — 36415 COLL VENOUS BLD VENIPUNCTURE: CPT | Performed by: NURSE PRACTITIONER

## 2023-02-16 PROCEDURE — 84295 ASSAY OF SERUM SODIUM: CPT | Performed by: NURSE PRACTITIONER

## 2023-02-16 PROCEDURE — 87040 BLOOD CULTURE FOR BACTERIA: CPT | Performed by: NURSE PRACTITIONER

## 2023-02-16 PROCEDURE — 250N000011 HC RX IP 250 OP 636: Performed by: NURSE PRACTITIONER

## 2023-02-16 PROCEDURE — 120N000002 HC R&B MED SURG/OB UMMC

## 2023-02-16 PROCEDURE — 96376 TX/PRO/DX INJ SAME DRUG ADON: CPT

## 2023-02-16 PROCEDURE — 86140 C-REACTIVE PROTEIN: CPT | Performed by: NURSE PRACTITIONER

## 2023-02-16 PROCEDURE — 258N000003 HC RX IP 258 OP 636: Performed by: NURSE PRACTITIONER

## 2023-02-16 PROCEDURE — 250N000011 HC RX IP 250 OP 636: Performed by: EMERGENCY MEDICINE

## 2023-02-16 PROCEDURE — 82962 GLUCOSE BLOOD TEST: CPT

## 2023-02-16 PROCEDURE — 250N000011 HC RX IP 250 OP 636: Performed by: INTERNAL MEDICINE

## 2023-02-16 PROCEDURE — 99233 SBSQ HOSP IP/OBS HIGH 50: CPT | Performed by: INTERNAL MEDICINE

## 2023-02-16 RX ORDER — KETOROLAC TROMETHAMINE 30 MG/ML
30 INJECTION, SOLUTION INTRAMUSCULAR; INTRAVENOUS EVERY 6 HOURS PRN
Status: DISCONTINUED | OUTPATIENT
Start: 2023-02-16 | End: 2023-02-17

## 2023-02-16 RX ORDER — HYDROMORPHONE HYDROCHLORIDE 1 MG/ML
0.5 INJECTION, SOLUTION INTRAMUSCULAR; INTRAVENOUS; SUBCUTANEOUS EVERY 4 HOURS PRN
Status: DISCONTINUED | OUTPATIENT
Start: 2023-02-16 | End: 2023-02-20 | Stop reason: HOSPADM

## 2023-02-16 RX ORDER — METRONIDAZOLE 500 MG/100ML
500 INJECTION, SOLUTION INTRAVENOUS EVERY 8 HOURS
Status: DISCONTINUED | OUTPATIENT
Start: 2023-02-16 | End: 2023-02-16

## 2023-02-16 RX ORDER — OXYCODONE HYDROCHLORIDE 5 MG/1
5 TABLET ORAL EVERY 4 HOURS PRN
Status: DISCONTINUED | OUTPATIENT
Start: 2023-02-16 | End: 2023-02-17

## 2023-02-16 RX ORDER — PIPERACILLIN SODIUM, TAZOBACTAM SODIUM 3; .375 G/15ML; G/15ML
3.38 INJECTION, POWDER, LYOPHILIZED, FOR SOLUTION INTRAVENOUS EVERY 6 HOURS
Status: DISCONTINUED | OUTPATIENT
Start: 2023-02-16 | End: 2023-02-19

## 2023-02-16 RX ORDER — SODIUM CHLORIDE 9 MG/ML
INJECTION, SOLUTION INTRAVENOUS CONTINUOUS
Status: DISCONTINUED | OUTPATIENT
Start: 2023-02-16 | End: 2023-02-19

## 2023-02-16 RX ADMIN — INSULIN ASPART 3 UNITS: 100 INJECTION, SOLUTION INTRAVENOUS; SUBCUTANEOUS at 17:11

## 2023-02-16 RX ADMIN — VANCOMYCIN HYDROCHLORIDE 1250 MG: 10 INJECTION, POWDER, LYOPHILIZED, FOR SOLUTION INTRAVENOUS at 23:36

## 2023-02-16 RX ADMIN — SENNOSIDES AND DOCUSATE SODIUM 2 TABLET: 50; 8.6 TABLET ORAL at 20:16

## 2023-02-16 RX ADMIN — HYDROMORPHONE HYDROCHLORIDE 0.5 MG: 1 INJECTION, SOLUTION INTRAMUSCULAR; INTRAVENOUS; SUBCUTANEOUS at 08:56

## 2023-02-16 RX ADMIN — HYDROXYZINE HYDROCHLORIDE 25 MG: 25 TABLET, FILM COATED ORAL at 21:48

## 2023-02-16 RX ADMIN — PIPERACILLIN AND TAZOBACTAM 3.38 G: 3; .375 INJECTION, POWDER, LYOPHILIZED, FOR SOLUTION INTRAVENOUS at 15:04

## 2023-02-16 RX ADMIN — FAMOTIDINE 40 MG: 20 TABLET ORAL at 08:30

## 2023-02-16 RX ADMIN — SODIUM CHLORIDE: 9 INJECTION, SOLUTION INTRAVENOUS at 20:30

## 2023-02-16 RX ADMIN — HYDROMORPHONE HYDROCHLORIDE 0.5 MG: 1 INJECTION, SOLUTION INTRAMUSCULAR; INTRAVENOUS; SUBCUTANEOUS at 22:17

## 2023-02-16 RX ADMIN — METFORMIN ER 500 MG 1500 MG: 500 TABLET ORAL at 17:13

## 2023-02-16 RX ADMIN — INSULIN ASPART 5 UNITS: 100 INJECTION, SOLUTION INTRAVENOUS; SUBCUTANEOUS at 08:29

## 2023-02-16 RX ADMIN — TRAMADOL HYDROCHLORIDE 50 MG: 50 TABLET, COATED ORAL at 13:43

## 2023-02-16 RX ADMIN — INSULIN ASPART 6 UNITS: 100 INJECTION, SOLUTION INTRAVENOUS; SUBCUTANEOUS at 12:42

## 2023-02-16 RX ADMIN — VANCOMYCIN HYDROCHLORIDE 1250 MG: 10 INJECTION, POWDER, LYOPHILIZED, FOR SOLUTION INTRAVENOUS at 10:41

## 2023-02-16 RX ADMIN — IBUPROFEN 600 MG: 600 TABLET, FILM COATED ORAL at 11:04

## 2023-02-16 RX ADMIN — PIPERACILLIN AND TAZOBACTAM 3.38 G: 3; .375 INJECTION, POWDER, LYOPHILIZED, FOR SOLUTION INTRAVENOUS at 20:20

## 2023-02-16 RX ADMIN — HYDROMORPHONE HYDROCHLORIDE 0.5 MG: 1 INJECTION, SOLUTION INTRAMUSCULAR; INTRAVENOUS; SUBCUTANEOUS at 13:43

## 2023-02-16 RX ADMIN — SENNOSIDES AND DOCUSATE SODIUM 2 TABLET: 50; 8.6 TABLET ORAL at 08:30

## 2023-02-16 RX ADMIN — SODIUM CHLORIDE: 9 INJECTION, SOLUTION INTRAVENOUS at 10:36

## 2023-02-16 ASSESSMENT — ACTIVITIES OF DAILY LIVING (ADL)
TOILETING_ISSUES: NO
ADLS_ACUITY_SCORE: 39
ADLS_ACUITY_SCORE: 39
CONCENTRATING,_REMEMBERING_OR_MAKING_DECISIONS_DIFFICULTY: NO
DRESSING/BATHING_DIFFICULTY: NO
ADLS_ACUITY_SCORE: 39
CHANGE_IN_FUNCTIONAL_STATUS_SINCE_ONSET_OF_CURRENT_ILLNESS/INJURY: NO
ADLS_ACUITY_SCORE: 39
DIFFICULTY_COMMUNICATING: NO
WALKING_OR_CLIMBING_STAIRS_DIFFICULTY: NO
ADLS_ACUITY_SCORE: 39
ADLS_ACUITY_SCORE: 39
WEAR_GLASSES_OR_BLIND: NO
ADLS_ACUITY_SCORE: 39
ADLS_ACUITY_SCORE: 39
FALL_HISTORY_WITHIN_LAST_SIX_MONTHS: YES
ADLS_ACUITY_SCORE: 26
DIFFICULTY_EATING/SWALLOWING: NO
DOING_ERRANDS_INDEPENDENTLY_DIFFICULTY: NO
ADLS_ACUITY_SCORE: 39

## 2023-02-16 NOTE — PLAN OF CARE
"Goal Outcome Evaluation:             /72 (BP Location: Right arm)   Pulse 111   Temp (!) 101.6  F (38.7  C) (Oral)   Resp 16   Ht 1.6 m (5' 3\")   Wt 67.9 kg (149 lb 11.1 oz)   SpO2 97%   BMI 26.52 kg/m           Goal Outcome Evaluation:     -diagnostic tests and consults completed and resulted: met.   -vital signs normal or at patient baseline: not met, temp 101.6,blood culture and ibuprofen done.  -tolerating oral intake to maintain hydration: met   -adequate pain control on oral analgesics: met.  -tolerating oral antibiotics or has plans for home infusion setup: not met.  -infection is improving: not met    "

## 2023-02-16 NOTE — PLAN OF CARE
"Goal Outcome Evaluation:       /62 (BP Location: Right arm, Patient Position: Semi-Post's)   Pulse 92   Temp 98.5  F (36.9  C) (Oral)   Resp 16   Ht 1.6 m (5' 3\")   Wt 67.9 kg (149 lb 11.1 oz)   SpO2 99%   BMI 26.52 kg/m        -diagnostic tests and consults completed and resulted: Met.  -vital signs normal or at patient baseline: Met.  -tolerating oral intake to maintain hydration: Met.  -adequate pain control on oral analgesics: met.  -tolerating oral antibiotics or has plans for home infusion setup: Not met.  -infection is improving: Not met          "

## 2023-02-16 NOTE — UTILIZATION REVIEW
Admission Status; Secondary Review Determination    Under the authority of the Utilization Management Committee, the utilization review process indicated a secondary review on the above patient. The review outcome is based on review of the medical records, discussions with staff, and applying clinical experience noted on the date of the review.    (x) Inpatient Status Appropriate - This patient's medical care is consistent with medical management for inpatient care and reasonable inpatient medical practice.    RATIONALE FOR DETERMINATION: 57-year-old male with history of IV methamphetamine abuse, mental health disease, HIV who presents to hospital 1 day after injecting methamphetamines into the dorsal aspect of the left forearm with significant left wrist pain.  Patient clinically has significant swelling and erythema to the left forearm consistent with cellulitis.  This was associated with leukocytosis that is mildly worsened on hospital day 2, initial CRP of 91 [place] to 197 on hospital day 2 with recurrent fevers 101.3 on day of admission and up to 101.6 the following afternoon while on IV antibiotics.  With patient's clinical worsening infection and comorbidities of diabetes and hyperglycemia, inpatient management appropriate.    At the time of admission with the information available to the attending physician more than 2 nights Hospital complex care was anticipated, based on patient risk of adverse outcome if treated as outpatient and complex care required. Inpatient admission is appropriate based on the Medicare guidelines.    This document was produced using voice recognition software    The information on this document is developed by the utilization review team in order for the business office to ensure compliance. This only denotes the appropriateness of proper admission status and does not reflect the quality of care rendered.    The definitions of Inpatient Status and Observation Status used in making  the determination above are those provided in the CMS Coverage Manual, Chapter 1 and Chapter 6, section 70.4.    Sincerely,    Kade Pillai MD  Utilization Review  Physician Advisor  [facility].

## 2023-02-16 NOTE — PLAN OF CARE
"Goal Outcome Evaluation:      /74 (BP Location: Right arm)   Pulse 112   Temp 99.9  F (37.7  C) (Oral)   Resp 16   Ht 1.6 m (5' 3\")   Wt 67.9 kg (149 lb 11.1 oz)   SpO2 98%   BMI 26.52 kg/m         Goal Outcome Evaluation:    -diagnostic tests and consults completed and resulted: met.   -vital signs normal or at patient baseline: not met.  -tolerating oral intake to maintain hydration: met   -adequate pain control on oral analgesics: met.  -tolerating oral antibiotics or has plans for home infusion setup: not met.  -infection is improving: not met    "

## 2023-02-16 NOTE — PLAN OF CARE
"Goal Outcome Evaluation:  /83 (BP Location: Left arm)   Pulse 101   Temp 100.1  F (37.8  C) (Oral)   Resp 18   Ht 1.6 m (5' 3\")   Wt 67.9 kg (149 lb 11.1 oz)   SpO2 100%   BMI 26.52 kg/m    -diagnostic tests and consults completed and resulted: met   -vital signs normal or at patient baseline: met   -tolerating oral intake to maintain hydration: met   -adequate pain control on oral analgesics: met    -tolerating oral antibiotics or has plans for home infusion setup: not met    -infection is improving: not met    "

## 2023-02-16 NOTE — PLAN OF CARE
"Goal Outcome Evaluation:  /61 (BP Location: Right arm)   Pulse 107   Temp 99.9  F (37.7  C) (Oral)   Resp 18   Ht 1.6 m (5' 3\")   Wt 67.9 kg (149 lb 11.1 oz)   SpO2 100%   BMI 26.52 kg/m     -diagnostic tests and consults completed and resulted: met   -vital signs normal or at patient baseline: not met   -tolerating oral intake to maintain hydration: met   -adequate pain control on oral analgesics: met    -tolerating oral antibiotics or has plans for home infusion setup: not met    -infection is improving: not met    "

## 2023-02-16 NOTE — PROGRESS NOTES
"ED Observation Progress Note  [facility]  Note Date: 2/16/2023    Andrew Collier MRN: [id number]   Age: 57 year old YOB: 1965     Interval History   -Pt is a 56 yo male who was admitted to the ED observation unit for swelling/pain in left wrist/forearm. Pt with increased pain with movement of the wrist.   This morning pt with fever and headache. He states he is feeling sick.     Physical Exam   /72 (BP Location: Right arm)   Pulse 111   Temp (!) 101.6  F (38.7  C) (Oral)   Resp 16   Ht 1.6 m (5' 3\")   Wt 67.9 kg (149 lb 11.1 oz)   SpO2 97%   BMI 26.52 kg/m    Physical Exam  Swollen left forearm with increased warmth  Unable to flex/extend due to pain  Laying in bed. Alert/oriented;answers questions well.   resp-stable    Results     Lab Results   Component Value Date    WBC 12.1 (H) 02/16/2023    WBC 11.1 (H) 02/15/2023    WBC 10.4 02/06/2023    HGB 10.9 (L) 02/16/2023    HGB 11.5 (L) 02/15/2023    HGB 16.1 02/06/2023    HCT 32.8 (L) 02/16/2023    HCT 35.0 (L) 02/15/2023    HCT 49.3 02/06/2023     02/16/2023     02/15/2023     02/06/2023     (L) 02/16/2023     (L) 02/15/2023     (L) 02/06/2023    POTASSIUM 3.6 02/16/2023    POTASSIUM 4.2 02/15/2023    POTASSIUM 4.6 02/06/2023    CHLORIDE 98 02/16/2023    CHLORIDE 97 (L) 02/15/2023    CHLORIDE 92 (L) 02/06/2023    CO2 21 (L) 02/16/2023    CO2 25 02/15/2023    CO2 27 02/06/2023    BUN 9.4 02/16/2023    BUN 27.7 (H) 02/15/2023    BUN 17.2 02/06/2023    CR 0.61 (L) 02/16/2023    CR 0.71 02/15/2023    CR 0.80 02/06/2023     (H) 02/16/2023     (H) 02/16/2023     (H) 02/16/2023    SED 10 12/19/2020    SED 21 (H) 02/25/2020    SED 18 02/14/2020    DD  11/23/2016     <0.3  This D-dimer assay is intended for use in conjuntion with a clinical pretest   probability assessment model to exclude pulmonary embolism (PE) and as an aid   in the diagnosis of " deep venous thrombosis (DVT) in outpatients suspected of PE   or DVT. The cut-off value is 0.5 g/mL FEU.      NTBNPI 32 12/17/2022    NTBNPI 68 11/11/2019    NTBNPI 31 03/06/2018    TROPONIN <0.015 10/30/2021    TROPONIN <0.015 09/10/2021    TROPONIN <0.015 08/22/2021    TROPI <0.015 06/24/2021    TROPI <0.015 12/19/2020    TROPI <0.015 08/14/2020    AST 24 02/06/2023    AST 15 02/01/2023    AST 24 01/24/2023    ALT 18 02/06/2023    ALT 31 02/01/2023    ALT 19 01/24/2023    ALKPHOS 110 02/06/2023    ALKPHOS 147 02/01/2023    ALKPHOS 140 (H) 01/24/2023    BILITOTAL 1.2 02/06/2023    BILITOTAL 0.4 02/01/2023    BILITOTAL 0.8 01/24/2023    INR 0.98 02/06/2023    INR 0.95 12/23/2022    INR 0.97 12/17/2022      -   CT wrist and US of arm were both reviewed.      Assessments & Plan (with Medical Decision Making)   Andrew Collier is a 57 year old male admitted to the ED Observation Unit with cellulitis of left forearm. Pt is on Vancomycin.  Will add MSSA coverage with ceftriaxone. Pt with elevated CRP and elevatd WBC today. Still febrile. Will add on blood cultures today. Pt is not improving so will need to be flipped to internal medicine either today or tomorrow.  If clinically improves and lab work improved possible discharge tomorrow. Appreciate ortho recs. .     Services consulted during the observation course: ortho. Notable consultant recommendations include: N/A    Observation goals to be met before discharge home:      --  Analilia Galeas MD  [facility] UNIT 6D OBSERVATION [place]  2/16/2023

## 2023-02-16 NOTE — PROGRESS NOTES
"[facility]    Medicine Transfer Acceptance/Progess Note - Hospitalist Service    Date of Admission:  2/15/2023    Assessment & Plan   Andrew Collier is a 57 year old male who was admitted on 2/15/2023.  He has a medical history of IV methamphetamine abuse, anxiety, depression and HIV admitted to ED observation for antibiotic treatment or L wrist cellulitis at an IV injection site.  Transferring to inpatient internal medicine for ongoing fevers, pain and elevation in inflammatory markers.    #Sepsis due to L wrist cellulitis   Up-trending inflammatory markers CRP 91->197, higher fevers 101 and ongoing pain.  Started IV Vancomycin 2/15, Ceftriaxone added this afternoon for anaerobic coverage.  CT with dorsal subcutaneous edema, no abscess, possible infectious tenosynovitis.  Ortho consulted 2/15.  - WBAT   - Appreciate re-evaluation from orthopedic team   - Continue IV Vanco/Zosyn   - Trend CBC and CRP in AM   - Elevation as able  - Follow blood cxs  - PRN Ibuprofen for pain and/or fever     #Poorly controlled type II DM   Hgb A1C 12.8 on Metformin only PTA.  Per chart review, pt refuses to take insulin at home as he feels it causes SBOs.  - Continue PTA metformin   - \"High\" sliding scale insulin while inpatient   - Hypoglycemia protocol   - Glucoses qAC and qHS    #HIV infection  Diagnosed 2015, has been undetectable for last few years with relatively good adherence.  CD4 has been >200.   - Continue PTA Biktarvy    #GERD   - Continue PTA Pepcid   - Regular diet    #Hyponatremia  Likely due to sepsis and poor PO intake   - Repeat level at 1600 to ensure not a steep downtrend   - IVF NS at 125mL/hour   - BMP in AM     #Anemia  Likely due to chronic illness and infection.  Hgb stable in 10s. No s.s of bleeding  - CBC in AM     #DAVID  #MDD  #Methamphetamine abuse, in acute withdrawal   - Continue  PRN hydroxyzine, melatonin, olanzapine ODT     Diet: Regular Diet Adult " "   DVT Prophylaxis: Ambulate every shift  Potts Catheter: Not present  Lines: None     Cardiac Monitoring: None  Code Status: Full Code        Disposition Plan      Expected Discharge Date: 02/18/2023        Discharge Comments: pending labs, vitals in a.m.        The patient's care was discussed with the Attending Physician, Dr. Mauro and Patient.    Peri Bob NP  Hospitalist Service  [facility]  Securely message with [employer] (more info)  Text page via [employer] Paging/Directory   ______________________________________________________________________    Interval History   Febrile today up to 101.1 with ongoing pain and feeling \"sick\". No SOB, chest pain, abd pain, diarrhea.     Physical Exam   Vital Signs: Temp: 98.5  F (36.9  C) Temp src: Oral BP: 101/62 Pulse: 92   Resp: 16 SpO2: 99 % O2 Device: None (Room air)    Weight: 149 lbs 11.08 oz    General Appearance: Drowsy but oriented.   Respiratory: Normal effort on RA, no wheezes, rales or rhonchi.   Cardiovascular: RRR, S1S2.  No murmurs appreciated.   GI: Abdomen soft, NTND.   Skin: No jaundice, no open wounds or rashes on visualized skin.       Data      ROUTINE IP LABS   CMP Recent Labs   Lab 02/16/23  1245 02/16/23  1121 02/16/23  0820 02/16/23  0659 02/15/23  1433 02/15/23  1049   NA  --   --   --  129*  --  132*   POTASSIUM  --   --   --  3.6  --  4.2   CHLORIDE  --   --   --  98  --  97*   CO2  --   --   --  21*  --  25   ANIONGAP  --   --   --  10  --  10   * 371* 263* 297*   < > 317*   BUN  --   --   --  9.4  --  27.7*   CR  --   --   --  0.61*  --  0.71   LINDA  --   --   --  8.4*  --  8.6    < > = values in this interval not displayed.     CBC   Recent Labs   Lab 02/16/23  0659 02/15/23  1049   WBC 12.1* 11.1*   RBC 3.73* 4.04*   HGB 10.9* 11.5*   HCT 32.8* 35.0*   MCV 88 87   MCH 29.2 28.5   MCHC 33.2 32.9   RDW 12.4 12.3    339     INR No lab results found in last 7 days.  CRP and ESR No " lab results found in last 7 days.  Troponin No lab results found in last 7 days.  Lactate Invalid input(s): LACTATE  Lipase No lab results found in last 7 days.    Imaging results reviewed over the past 24 hrs:   Recent Results (from the past 24 hour(s))   XR Wrist Port Left G/E 3 Views    Addendum: 2/15/2023    ADDENDUM dictated by Dr. Cornelius Dempsey,, 02/15/2023  Original report dictated by Dr. Cornelius Dempsey, 02/15/2023    The following report supersedes the previously distributed report. Changes have been made to the original report as indicated by double parentheses (( )). Please disregard previous report.     EXAM: XR WRIST PORT LEFT G/E 3 VIEWS  LOCATION: [facility]  DATE/TIME: 2/15/2023 5:54 PM    INDICATION: Cellulitis in IV drug user, assessing for retained foreign body  COMPARISON: None.    IMPRESSION: Soft tissue swelling in the hand, wrist and distal forearm. ((No)) foreign body is evident. No soft tissue emphysema. No fractures are identified.    ((Examination discussed with referring clinician.))            Narrative    EXAM: XR WRIST PORT LEFT G/E 3 VIEWS  LOCATION: [facility]  DATE/TIME: 2/15/2023 5:54 PM    INDICATION: cellultis in IV drug user, assesing for retained foreign body  COMPARISON: None.      Impression    IMPRESSION: Soft tissue swelling in the hand, wrist and distal forearm. Foreign body is evident. No soft tissue emphysema. No fractures are identified.

## 2023-02-16 NOTE — PROGRESS NOTES
Observation Unit Transfer Summary     Patient ID:  Andrew Collier  MRN: [id number]  57 year old  YOB: 1965    Observation Admit Date: 2/15/2023    ED Admitting Attending: Esther Mauro MD    Transfer Date and Time: February 16, 2023 at 3:37 PM     Transferring Observation Provider: VIKASH Harris CNP    Admission Diagnoses:     1. Cellulitis of left wrist        Transfer Diagnoses:    Left Forearm Cellulitis   Febrile    Emergency Department and Observation Course:      Andrew Collier is a 57 year old male who has a history significant for IV methamphetamine  Abuse, anxiety, depression, HIV admitted to OBS for antibiotic treatment for cellulitis of left wrist from at IV injection site.      Infectious disease:   # Left Forearm Cellulitis   Patient has swelling and erythremia to left forearm. This is an area where he injected methamphetamines on 2/14.  He does not want to move arm because of pain. He reports the pain is localized to the dorsal surface of his hand.  He also will not let any other providers manipulate his hands or examine area. There does not appear to be any visible area of abscess that would require I&D. Patient was admitted to Obs for IV antibiotics and monitoring.  CT of extremity for foreign body completed: Dorsal subcutaneous edema at the level of the wrist and distal forearm. No focal rim-enhancing fluid collection. No subcutaneous emphysema. Tenosynovitis at the intersection of the second and third extensor compartments most substantially affecting the distal extensor carpi  radialis brevis. Infectious tenosynovitis is in the differential.Wrist xray: Soft tissue swelling in the hand, wrist and distal forearm. ((No)) foreign body is evident. No soft tissue emphysema. No fractures are identified. Ortho consulted and evaluated patient.   - WBC: 12.1 up from 11.1  - CRP:  197.00 up from 91.40  - Tmax: 101 6 today   - Clindamycin x1  - Vancomycin started  "2/15  - Add Zosyn for anaerobic coverage  - BC pending      # HIV  # Hx of MRSA, nares swab 12/8/22 Neg   - PTA: Biktarvy   - Per note 1/25/23: \"Diagnosed 2015; previous therapy with Triumeq, Genvoya, now on Biktarvy. Has been undetectable for the last few years with relatively good adherence (though missing up to 10 doses per month)... HIV VL undetectable in 10/2022, his CD4 has been >200.\"      Neuro/Pain/Psych:  # Acute pain   - Prn: Ibuprofen, Ultram  - Per chart pt has allergy to fentanyl (blisters) and Tylenol (itching)      # DAVID  # MDD  # Methamphetamine abuse, in acute withdrawal   - started prn: hydroxyzine, melatonin, olanzapine ODT  - one time dose of Ativan given for CT, anxiety around pain in arm        GI/Nutrition:    # GERD  - continue PTA: Pepcid, .  - Regular Diet      Renal/ Fluids/Electrolytes:  # Hyponatremia   - Na: 132  - Creatinine: 0.71  - electrolyte replacement protocol in place.      Endocrine:  # Diabetes Mellitus II  -  Glucose trend : 263/194/322/219  - Continue PTA medications: Metformin   - High Resistance Sliding scale for glucose management. Goal to keep BG < 180 for optimal wound healing      # Hyponatremia    - Continuous IVF      Hematology:    # Anemia of chronic illness   - Hgb 10.9. Monitor and trend.   - Threshold for transfusion if hgb <7.0 or signs/symptoms of hypoperfusion.      At this time the patient has failed observation management due to continued fevers on Vancomycin and will be transferred to inpatient status.    Consults: orthopedic surgery    DATA:    Transfer Exam:    /62 (BP Location: Right arm, Patient Position: Semi-Post's)   Pulse 92   Temp 98.5  F (36.9  C) (Oral)   Resp 16   Ht 1.6 m (5' 3\")   Wt 67.9 kg (149 lb 11.1 oz)   SpO2 99%   BMI 26.52 kg/m       General: alert, oriented to person, place, time  Head: atraumatic, normocephalic,  Eyes: PERRLA, pupils 3mm, EOMI, corneas and conjunctivae clear  Chest/Pulmonary: normal respiratory " rate and rhythm,  bilateral clear breath sounds,   Cardiovascular: normal and regular rate and rhythm,   Abdomen: soft,no guarding, no distention   Musculoskel/Extremities: left wrist edema and erythema, limited exam pt refuses to move hand or arm    Skin: no rashes, laceration, ecchymosis, skin warm and dry.   Neuro: PERRLA, alert, oriented x 3.  Sensation intact.  Psychiatric: affect/mood normal,        Current Medications:    No current outpatient medications on file.       Medications Prior to Admission:    Medications Prior to Admission   Medication Sig Dispense Refill Last Dose     bictegravir-emtricitabine-tenofovir (BIKTARVY) -25 MG per tablet Take 1 tablet by mouth daily 30 tablet 2      blood glucose (NO BRAND SPECIFIED) lancets standard Use to test blood sugar 1 time daily or as directed. 100 lancet 4      blood glucose (NO BRAND SPECIFIED) test strip Use to test blood sugar 1 time daily or as directed. 100 strip 4      blood glucose monitoring (NO BRAND SPECIFIED) meter device kit Use to test blood sugar 1 time daily or as directed. 1 kit 0      famotidine (PEPCID) 40 MG tablet Take 1 tablet (40 mg) by mouth daily 30 tablet 1      metFORMIN (GLUCOPHAGE XR) 500 MG 24 hr tablet Take 3 tablets (1,500 mg) by mouth daily (with dinner) With food 90 tablet 3        Significant Diagnostic Studies:     Results for orders placed or performed during the hospital encounter of 02/15/23    Upper Extremity Non Vascular Left     Status: None    Narrative    EXAMINATION: US UPPER EXTREMITY NON VASCULAR LEFT, 2/15/2023 8:48 AM     COMPARISON: None.    HISTORY: Distal left forearm swelling. Evaluate for abscess.    TECHNIQUE: Grayscale and limited color doppler imaging acquired in the  left distal forearm in the region of swelling indicated by the  patient. Images acquired on the dorsal and ventral surfaces.    FINDINGS/    Impression    IMPRESSION: Subcutaneous edema is demonstrated in the distal left  forearm and  wrist region without drainable fluid collection  identified. There may be some mild hyperemia which likely reflects the  cellulitis/induration present clinically in this region.    APRIL ELIZONDO MD         SYSTEM ID:  [id number]   CT Forearm Left w Contrast     Status: None    Narrative    EXAM: CT FOREARM LEFT W CONTRAST  2/15/2023 2:18 PM      HISTORY: L distal / dorsal forearm/wrist infection ? necrotizng  infection    COMPARISON: None    TECHNIQUE: CT imaging of the left forearm after the administration of  IV contrast. Contrast: 92 cc Isovue-370. Multiplanar reconstructions    FINDINGS:      images: Unremarkable    No acute osseous abnormality. Borderline ulnar negative variance. No  substantial degenerative change.    Subcutaneous edema dorsal to the distal forearm and visualized wrist.  No focal rim-enhancing fluid collection. Mild tenosynovial fluid at  the intersection of the second and third extensor compartments. No  subcutaneous emphysema.       Impression    IMPRESSION:     1. Dorsal subcutaneous edema at the level of the wrist and distal  forearm. No focal rim-enhancing fluid collection. No subcutaneous  emphysema.    2. Tenosynovitis at the intersection of the second and third extensor  compartments most substantially affecting the distal extensor carpi  radialis brevis. Infectious tenosynovitis is in the differential.    MARGIE GODWIN MD (Joe)         SYSTEM ID:  [id number]   XR Wrist Port Left G/E 3 Views     Status: None    Narrative    EXAM: XR WRIST PORT LEFT G/E 3 VIEWS  LOCATION: [facility]  DATE/TIME: 2/15/2023 5:54 PM    INDICATION: cellultis in IV drug user, assesing for retained foreign body  COMPARISON: None.      Impression    IMPRESSION: Soft tissue swelling in the hand, wrist and distal forearm. Foreign body is evident. No soft tissue emphysema. No fractures are identified.   CRP inflammation     Status: Abnormal   Result Value  Ref Range    CRP Inflammation 91.40 (H) <5.00 mg/L   Basic metabolic panel     Status: Abnormal   Result Value Ref Range    Sodium 132 (L) 136 - 145 mmol/L    Potassium 4.2 3.4 - 5.3 mmol/L    Chloride 97 (L) 98 - 107 mmol/L    Carbon Dioxide (CO2) 25 22 - 29 mmol/L    Anion Gap 10 7 - 15 mmol/L    Urea Nitrogen 27.7 (H) 8.0 - 23.0 mg/dL    Creatinine 0.71 0.67 - 1.17 mg/dL    Calcium 8.6 8.6 - 10.0 mg/dL    Glucose 317 (H) 70 - 99 mg/dL    GFR Estimate >90 >60 mL/min/1.73m2   CBC with platelets and differential     Status: Abnormal   Result Value Ref Range    WBC Count 11.1 (H) 4.0 - 11.0 10e3/uL    RBC Count 4.04 (L) 4.40 - 5.90 10e6/uL    Hemoglobin 11.5 (L) 13.3 - 17.7 g/dL    Hematocrit 35.0 (L) 40.0 - 53.0 %    MCV 87 78 - 100 fL    MCH 28.5 26.5 - 33.0 pg    MCHC 32.9 31.5 - 36.5 g/dL    RDW 12.3 10.0 - 15.0 %    Platelet Count 339 150 - 450 10e3/uL    % Neutrophils 79 %    % Lymphocytes 11 %    % Monocytes 8 %    % Eosinophils 1 %    % Basophils 0 %    % Immature Granulocytes 1 %    NRBCs per 100 WBC 0 <1 /100    Absolute Neutrophils 8.8 (H) 1.6 - 8.3 10e3/uL    Absolute Lymphocytes 1.3 0.8 - 5.3 10e3/uL    Absolute Monocytes 0.8 0.0 - 1.3 10e3/uL    Absolute Eosinophils 0.1 0.0 - 0.7 10e3/uL    Absolute Basophils 0.0 0.0 - 0.2 10e3/uL    Absolute Immature Granulocytes 0.1 <=0.4 10e3/uL    Absolute NRBCs 0.0 10e3/uL   Glucose by meter     Status: Abnormal   Result Value Ref Range    GLUCOSE BY METER POCT 240 (H) 70 - 99 mg/dL   Glucose by meter     Status: Abnormal   Result Value Ref Range    GLUCOSE BY METER POCT 219 (H) 70 - 99 mg/dL   Glucose by meter     Status: Abnormal   Result Value Ref Range    GLUCOSE BY METER POCT 322 (H) 70 - 99 mg/dL   Basic metabolic panel     Status: Abnormal   Result Value Ref Range    Sodium 129 (L) 136 - 145 mmol/L    Potassium 3.6 3.4 - 5.3 mmol/L    Chloride 98 98 - 107 mmol/L    Carbon Dioxide (CO2) 21 (L) 22 - 29 mmol/L    Anion Gap 10 7 - 15 mmol/L    Urea Nitrogen 9.4  8.0 - 23.0 mg/dL    Creatinine 0.61 (L) 0.67 - 1.17 mg/dL    Calcium 8.4 (L) 8.6 - 10.0 mg/dL    Glucose 297 (H) 70 - 99 mg/dL    GFR Estimate >90 >60 mL/min/1.73m2   CBC with platelets     Status: Abnormal   Result Value Ref Range    WBC Count 12.1 (H) 4.0 - 11.0 10e3/uL    RBC Count 3.73 (L) 4.40 - 5.90 10e6/uL    Hemoglobin 10.9 (L) 13.3 - 17.7 g/dL    Hematocrit 32.8 (L) 40.0 - 53.0 %    MCV 88 78 - 100 fL    MCH 29.2 26.5 - 33.0 pg    MCHC 33.2 31.5 - 36.5 g/dL    RDW 12.4 10.0 - 15.0 %    Platelet Count 327 150 - 450 10e3/uL   Glucose by meter     Status: Abnormal   Result Value Ref Range    GLUCOSE BY METER POCT 194 (H) 70 - 99 mg/dL   CRP inflammation     Status: Abnormal   Result Value Ref Range    CRP Inflammation 197.00 (H) <5.00 mg/L   Lactic Acid STAT     Status: Abnormal   Result Value Ref Range    Lactic Acid 0.5 (L) 0.7 - 2.0 mmol/L   Glucose by meter     Status: Abnormal   Result Value Ref Range    GLUCOSE BY METER POCT 263 (H) 70 - 99 mg/dL   Glucose by meter     Status: Abnormal   Result Value Ref Range    GLUCOSE BY METER POCT 371 (H) 70 - 99 mg/dL   Glucose by meter     Status: Abnormal   Result Value Ref Range    GLUCOSE BY METER POCT 284 (H) 70 - 99 mg/dL   EKG 12-lead, complete     Status: None (Preliminary result)   Result Value Ref Range    Systolic Blood Pressure  mmHg    Diastolic Blood Pressure  mmHg    Ventricular Rate 101 BPM    Atrial Rate 101 BPM    IA Interval 126 ms    QRS Duration 86 ms     ms    QTc 433 ms    P Axis 42 degrees    R AXIS -10 degrees    T Axis 11 degrees    Interpretation ECG       Sinus tachycardia  Otherwise normal ECG  When compared with ECG of 06-FEB-2023 19:37,  No significant change was found     CBC with platelets differential     Status: Abnormal    Narrative    The following orders were created for panel order CBC with platelets differential.  Procedure                               Abnormality         Status                     ---------                                -----------         ------                     CBC with platelets and d...[[id number]]  Abnormal            Final result                 Please view results for these tests on the individual orders.       Signed:  VIKASH Harris CNP  February 16, 2023 at 3:37 PM

## 2023-02-16 NOTE — PLAN OF CARE
"Goal Outcome Evaluation:  /73 (BP Location: Right arm, Patient Position: Semi-Post's)   Pulse 110   Temp 100.4  F (38  C) (Oral)   Resp 16   Ht 1.6 m (5' 3\")   Wt 67.9 kg (149 lb 11.1 oz)   SpO2 100%   BMI 26.52 kg/m    -diagnostic tests and consults completed and resulted: met   -vital signs normal or at patient baseline: not met, pt running low grade fever through night  -tolerating oral intake to maintain hydration: met   -adequate pain control on oral analgesics: met, pt did not complain of pain overnight    -tolerating oral antibiotics or has plans for home infusion setup: not met, IV vancomycin q12h  -infection is improving: not met    "

## 2023-02-17 ENCOUNTER — APPOINTMENT (OUTPATIENT)
Dept: CT IMAGING | Facility: CLINIC | Age: 58
End: 2023-02-17
Attending: INTERNAL MEDICINE
Payer: MEDICAID

## 2023-02-17 LAB
ANION GAP SERPL CALCULATED.3IONS-SCNC: 9 MMOL/L (ref 7–15)
BUN SERPL-MCNC: 8.5 MG/DL (ref 8–23)
CALCIUM SERPL-MCNC: 8.5 MG/DL (ref 8.6–10)
CHLORIDE SERPL-SCNC: 101 MMOL/L (ref 98–107)
CK SERPL-CCNC: 39 U/L (ref 39–308)
CREAT SERPL-MCNC: 0.58 MG/DL (ref 0.67–1.17)
CRP SERPL-MCNC: 170 MG/L
DEPRECATED HCO3 PLAS-SCNC: 21 MMOL/L (ref 22–29)
ERYTHROCYTE [DISTWIDTH] IN BLOOD BY AUTOMATED COUNT: 12.3 % (ref 10–15)
GFR SERPL CREATININE-BSD FRML MDRD: >90 ML/MIN/1.73M2
GLUCOSE BLDC GLUCOMTR-MCNC: 185 MG/DL (ref 70–99)
GLUCOSE BLDC GLUCOMTR-MCNC: 197 MG/DL (ref 70–99)
GLUCOSE BLDC GLUCOMTR-MCNC: 201 MG/DL (ref 70–99)
GLUCOSE BLDC GLUCOMTR-MCNC: 249 MG/DL (ref 70–99)
GLUCOSE BLDC GLUCOMTR-MCNC: 257 MG/DL (ref 70–99)
GLUCOSE BLDC GLUCOMTR-MCNC: 314 MG/DL (ref 70–99)
GLUCOSE SERPL-MCNC: 189 MG/DL (ref 70–99)
HCT VFR BLD AUTO: 31.3 % (ref 40–53)
HGB BLD-MCNC: 10.3 G/DL (ref 13.3–17.7)
LACTATE SERPL-SCNC: 1.4 MMOL/L (ref 0.7–2)
MAGNESIUM SERPL-MCNC: 1.6 MG/DL (ref 1.7–2.3)
MCH RBC QN AUTO: 28.9 PG (ref 26.5–33)
MCHC RBC AUTO-ENTMCNC: 32.9 G/DL (ref 31.5–36.5)
MCV RBC AUTO: 88 FL (ref 78–100)
PLATELET # BLD AUTO: 325 10E3/UL (ref 150–450)
POTASSIUM SERPL-SCNC: 3.3 MMOL/L (ref 3.4–5.3)
POTASSIUM SERPL-SCNC: 3.6 MMOL/L (ref 3.4–5.3)
RBC # BLD AUTO: 3.57 10E6/UL (ref 4.4–5.9)
SODIUM SERPL-SCNC: 131 MMOL/L (ref 136–145)
VANCOMYCIN SERPL-MCNC: 9.6 UG/ML
WBC # BLD AUTO: 11.2 10E3/UL (ref 4–11)

## 2023-02-17 PROCEDURE — 73201 CT UPPER EXTREMITY W/DYE: CPT | Mod: 26 | Performed by: RADIOLOGY

## 2023-02-17 PROCEDURE — 250N000011 HC RX IP 250 OP 636: Performed by: INTERNAL MEDICINE

## 2023-02-17 PROCEDURE — 250N000011 HC RX IP 250 OP 636: Performed by: NURSE PRACTITIONER

## 2023-02-17 PROCEDURE — 84132 ASSAY OF SERUM POTASSIUM: CPT | Performed by: INTERNAL MEDICINE

## 2023-02-17 PROCEDURE — 99233 SBSQ HOSP IP/OBS HIGH 50: CPT | Performed by: INTERNAL MEDICINE

## 2023-02-17 PROCEDURE — 85018 HEMOGLOBIN: CPT | Performed by: NURSE PRACTITIONER

## 2023-02-17 PROCEDURE — 83605 ASSAY OF LACTIC ACID: CPT | Performed by: INTERNAL MEDICINE

## 2023-02-17 PROCEDURE — 73201 CT UPPER EXTREMITY W/DYE: CPT | Mod: LT,XS

## 2023-02-17 PROCEDURE — 250N000012 HC RX MED GY IP 250 OP 636 PS 637: Performed by: INTERNAL MEDICINE

## 2023-02-17 PROCEDURE — 250N000013 HC RX MED GY IP 250 OP 250 PS 637: Performed by: INTERNAL MEDICINE

## 2023-02-17 PROCEDURE — 73201 CT UPPER EXTREMITY W/DYE: CPT | Mod: LT

## 2023-02-17 PROCEDURE — 82550 ASSAY OF CK (CPK): CPT | Performed by: INTERNAL MEDICINE

## 2023-02-17 PROCEDURE — 120N000002 HC R&B MED SURG/OB UMMC

## 2023-02-17 PROCEDURE — 36415 COLL VENOUS BLD VENIPUNCTURE: CPT | Performed by: INTERNAL MEDICINE

## 2023-02-17 PROCEDURE — 80202 ASSAY OF VANCOMYCIN: CPT | Performed by: EMERGENCY MEDICINE

## 2023-02-17 PROCEDURE — 86140 C-REACTIVE PROTEIN: CPT | Performed by: NURSE PRACTITIONER

## 2023-02-17 PROCEDURE — 80048 BASIC METABOLIC PNL TOTAL CA: CPT | Performed by: NURSE PRACTITIONER

## 2023-02-17 PROCEDURE — 36415 COLL VENOUS BLD VENIPUNCTURE: CPT | Performed by: EMERGENCY MEDICINE

## 2023-02-17 PROCEDURE — 250N000013 HC RX MED GY IP 250 OP 250 PS 637: Performed by: PHYSICIAN ASSISTANT

## 2023-02-17 PROCEDURE — 258N000003 HC RX IP 258 OP 636: Performed by: NURSE PRACTITIONER

## 2023-02-17 PROCEDURE — 258N000003 HC RX IP 258 OP 636: Performed by: INTERNAL MEDICINE

## 2023-02-17 PROCEDURE — 83735 ASSAY OF MAGNESIUM: CPT | Performed by: INTERNAL MEDICINE

## 2023-02-17 RX ORDER — DEXTROSE MONOHYDRATE 25 G/50ML
25-50 INJECTION, SOLUTION INTRAVENOUS
Status: DISCONTINUED | OUTPATIENT
Start: 2023-02-17 | End: 2023-02-17

## 2023-02-17 RX ORDER — ENOXAPARIN SODIUM 100 MG/ML
40 INJECTION SUBCUTANEOUS EVERY 24 HOURS
Status: DISCONTINUED | OUTPATIENT
Start: 2023-02-17 | End: 2023-02-20 | Stop reason: HOSPADM

## 2023-02-17 RX ORDER — POTASSIUM CHLORIDE 29.8 MG/ML
20 INJECTION INTRAVENOUS ONCE
Status: CANCELLED | OUTPATIENT
Start: 2023-02-17 | End: 2023-02-17

## 2023-02-17 RX ORDER — IOPAMIDOL 755 MG/ML
90 INJECTION, SOLUTION INTRAVASCULAR ONCE
Status: COMPLETED | OUTPATIENT
Start: 2023-02-17 | End: 2023-02-17

## 2023-02-17 RX ORDER — OXYCODONE HYDROCHLORIDE 5 MG/1
5 TABLET ORAL EVERY 4 HOURS PRN
Status: DISCONTINUED | OUTPATIENT
Start: 2023-02-17 | End: 2023-02-20 | Stop reason: HOSPADM

## 2023-02-17 RX ORDER — KETOROLAC TROMETHAMINE 30 MG/ML
30 INJECTION, SOLUTION INTRAMUSCULAR; INTRAVENOUS EVERY 6 HOURS
Status: DISCONTINUED | OUTPATIENT
Start: 2023-02-17 | End: 2023-02-18

## 2023-02-17 RX ORDER — POTASSIUM CHLORIDE 1.5 G/1.58G
40 POWDER, FOR SOLUTION ORAL ONCE
Status: COMPLETED | OUTPATIENT
Start: 2023-02-17 | End: 2023-02-17

## 2023-02-17 RX ORDER — METFORMIN HCL 500 MG
1500 TABLET, EXTENDED RELEASE 24 HR ORAL
Status: CANCELLED | OUTPATIENT
Start: 2023-02-17

## 2023-02-17 RX ORDER — OXYCODONE HYDROCHLORIDE 10 MG/1
10 TABLET ORAL EVERY 4 HOURS PRN
Status: DISCONTINUED | OUTPATIENT
Start: 2023-02-17 | End: 2023-02-20 | Stop reason: HOSPADM

## 2023-02-17 RX ORDER — POTASSIUM CHLORIDE 750 MG/1
40 TABLET, EXTENDED RELEASE ORAL ONCE
Status: COMPLETED | OUTPATIENT
Start: 2023-02-17 | End: 2023-02-17

## 2023-02-17 RX ORDER — NICOTINE POLACRILEX 4 MG
15-30 LOZENGE BUCCAL
Status: DISCONTINUED | OUTPATIENT
Start: 2023-02-17 | End: 2023-02-17

## 2023-02-17 RX ADMIN — INSULIN ASPART 2 UNITS: 100 INJECTION, SOLUTION INTRAVENOUS; SUBCUTANEOUS at 11:25

## 2023-02-17 RX ADMIN — POTASSIUM CHLORIDE 40 MEQ: 750 TABLET, EXTENDED RELEASE ORAL at 19:57

## 2023-02-17 RX ADMIN — PIPERACILLIN AND TAZOBACTAM 3.38 G: 3; .375 INJECTION, POWDER, LYOPHILIZED, FOR SOLUTION INTRAVENOUS at 08:49

## 2023-02-17 RX ADMIN — SODIUM CHLORIDE, POTASSIUM CHLORIDE, SODIUM LACTATE AND CALCIUM CHLORIDE 500 ML: 600; 310; 30; 20 INJECTION, SOLUTION INTRAVENOUS at 15:41

## 2023-02-17 RX ADMIN — ENOXAPARIN SODIUM 40 MG: 40 INJECTION SUBCUTANEOUS at 15:44

## 2023-02-17 RX ADMIN — PIPERACILLIN AND TAZOBACTAM 3.38 G: 3; .375 INJECTION, POWDER, LYOPHILIZED, FOR SOLUTION INTRAVENOUS at 19:57

## 2023-02-17 RX ADMIN — KETOROLAC TROMETHAMINE 30 MG: 30 INJECTION, SOLUTION INTRAMUSCULAR; INTRAVENOUS at 22:51

## 2023-02-17 RX ADMIN — IOPAMIDOL 90 ML: 755 INJECTION, SOLUTION INTRAVENOUS at 13:15

## 2023-02-17 RX ADMIN — KETOROLAC TROMETHAMINE 30 MG: 30 INJECTION, SOLUTION INTRAMUSCULAR; INTRAVENOUS at 16:47

## 2023-02-17 RX ADMIN — BICTEGRAVIR SODIUM, EMTRICITABINE, AND TENOFOVIR ALAFENAMIDE FUMARATE 1 TABLET: 50; 200; 25 TABLET ORAL at 11:25

## 2023-02-17 RX ADMIN — FAMOTIDINE 40 MG: 20 TABLET ORAL at 08:49

## 2023-02-17 RX ADMIN — KETOROLAC TROMETHAMINE 30 MG: 30 INJECTION, SOLUTION INTRAMUSCULAR; INTRAVENOUS at 10:14

## 2023-02-17 RX ADMIN — PIPERACILLIN AND TAZOBACTAM 3.38 G: 3; .375 INJECTION, POWDER, LYOPHILIZED, FOR SOLUTION INTRAVENOUS at 14:10

## 2023-02-17 RX ADMIN — INSULIN GLARGINE 8 UNITS: 100 INJECTION, SOLUTION SUBCUTANEOUS at 21:59

## 2023-02-17 RX ADMIN — SENNOSIDES AND DOCUSATE SODIUM 2 TABLET: 50; 8.6 TABLET ORAL at 08:49

## 2023-02-17 RX ADMIN — PIPERACILLIN AND TAZOBACTAM 3.38 G: 3; .375 INJECTION, POWDER, LYOPHILIZED, FOR SOLUTION INTRAVENOUS at 01:53

## 2023-02-17 RX ADMIN — SODIUM CHLORIDE: 9 INJECTION, SOLUTION INTRAVENOUS at 12:16

## 2023-02-17 RX ADMIN — VANCOMYCIN HYDROCHLORIDE 1250 MG: 10 INJECTION, POWDER, LYOPHILIZED, FOR SOLUTION INTRAVENOUS at 09:33

## 2023-02-17 RX ADMIN — VANCOMYCIN HYDROCHLORIDE 1250 MG: 10 INJECTION, POWDER, LYOPHILIZED, FOR SOLUTION INTRAVENOUS at 16:47

## 2023-02-17 RX ADMIN — INSULIN ASPART 5 UNITS: 100 INJECTION, SOLUTION INTRAVENOUS; SUBCUTANEOUS at 18:55

## 2023-02-17 ASSESSMENT — ACTIVITIES OF DAILY LIVING (ADL)
ADLS_ACUITY_SCORE: 26
DEPENDENT_IADLS:: INDEPENDENT
ADLS_ACUITY_SCORE: 26

## 2023-02-17 NOTE — PLAN OF CARE
"BP 98/59 (BP Location: Right arm)   Pulse 88   Temp 98.3  F (36.8  C) (Oral)   Resp 16   Ht 1.6 m (5' 3\")   Wt 67.9 kg (149 lb 11.1 oz)   SpO2 100%   BMI 26.52 kg/m       -diagnostic tests and consults completed and resulted: Met.  -vital signs normal or at patient baseline: Met.  -tolerating oral intake to maintain hydration: Met.  -adequate pain control on oral analgesics: met.  -tolerating oral antibiotics or has plans for home infusion setup: Not met.  -infection is improving: Not met  "

## 2023-02-17 NOTE — PHARMACY-VANCOMYCIN DOSING SERVICE
Pharmacy Vancomycin Note  Date of Service 2023  Patient's  1965   57 year old, male    Indication: Skin and Soft Tissue Infection  Day of Therapy: 3  Current vancomycin regimen: 1250 mg IV q12h  Current vancomycin monitoring method: AUC  Current vancomycin therapeutic monitoring goal: 400-600 mg*h/L    Insight[employer] Prediction of Current Vancomycin Regimen  Regimen: 1250 mg IV every 12 hours.  Start time: 11:36 on 2023  Exposure target: AUC24 (range)400-600 mg/L.hr   AUC24,ss: 367 mg/L.hr  Probability of AUC24 > 400: 33 %  Ctrough,ss: 7.9 mg/L  Probability of Ctrough,ss > 20: 0 %  Probability of nephrotoxicity (Lodise CISCO ): 5 %    Current estimated CrCl = Estimated Creatinine Clearance: 131.7 mL/min (A) (based on SCr of 0.58 mg/dL (L)).    Creatinine for last 3 days  2/15/2023: 10:49 AM Creatinine 0.71 mg/dL  2023:  6:59 AM Creatinine 0.61 mg/dL  2023:  7:06 AM Creatinine 0.58 mg/dL    Recent Vancomycin Levels (past 3 days)  2023:  7:06 AM Vancomycin 9.6 ug/mL    Vancomycin IV Administrations (past 72 hours)                   vancomycin (VANCOCIN) 1,250 mg in 0.9% NaCl 250 mL intermittent infusion (mg) 1,250 mg New Bag 23 2336     1,250 mg New Bag  1041     1,250 mg New Bag 02/15/23 2336    vancomycin (VANCOCIN) 1,500 mg in 0.9% NaCl 250 mL intermittent infusion (mg) 1,500 mg New Bag 02/15/23 1050                Nephrotoxins and other renal medications (From now, onward)    Start     Dose/Rate Route Frequency Ordered Stop    23 0830  vancomycin (VANCOCIN) 1,250 mg in 0.9% NaCl 250 mL intermittent infusion         1,250 mg  over 90 Minutes Intravenous EVERY 8 HOURS 23 0827      23 1630  ketorolac (TORADOL) injection 30 mg         30 mg Intravenous EVERY 6 HOURS PRN 23 1631 23 1629    23 1400  piperacillin-tazobactam (ZOSYN) 3.375 g vial to attach to  mL bag        Note to Pharmacy: For [facility], [facility] and [facility]: For Zosyn-naive  "patients, use the \"Zosyn initial dose + extended infusion\" order panel.    3.375 g  over 30 Minutes Intravenous EVERY 6 HOURS 02/16/23 1346               Contrast Orders - past 72 hours (72h ago, onward)    Start     Dose/Rate Route Frequency Stop    02/15/23 1400  iopamidol (ISOVUE-370) solution 92 mL         92 mL Intravenous ONCE 02/15/23 1409        Interpretation of levels and current regimen:  Vancomycin level is reflective of AUC less than 400    Has serum creatinine changed greater than 50% in last 72 hours: No  Urine output: unable to determine  Renal Function: stable to improving    InsightRX Prediction of Planned New Vancomycin Regimen  Regimen: 1250 mg IV every 8 hours.  Start time: 11:36 on 02/17/2023  Exposure target: AUC24 (range)400-600 mg/L.hr   AUC24,ss: 551 mg/L.hr  Probability of AUC24 > 400: 96 %  Ctrough,ss: 14.4 mg/L  Probability of Ctrough,ss > 20: 11 %  Probability of nephrotoxicity (Lodise CISCO 2009): 10 %    Plan:  1. Increase Dose to 1250 mg IV q8h  2. Vancomycin monitoring method: AUC  3. Vancomycin therapeutic monitoring goal: 400-600 mg*h/L  4. Pharmacy will check vancomycin levels as appropriate in 1-3 Days.  5. Serum creatinine levels will be ordered daily for the first week of therapy and at least twice weekly for subsequent weeks.    Sandra Grajeda, [facility]    "

## 2023-02-17 NOTE — PROGRESS NOTES
Orthopaedic Surgery Progress Note 02/17/2023    S: Evaluated by ortho 2/17.  Patient reports continued pain of his left hand that has not improved.  He states that has gotten somewhat worse.  He states that this pain started a few days ago after he injected methamphetamine into the dorsal aspect of his wrist region.  He was previously evaluated on 2/15/2023.  A CT was done at that time.  Denies numbness, tingling.  Denies diminished sensation over the hand.  Has been on vancomycin and Zosyn.      O:  Temp: 100  F (37.8  C) Temp src: Oral BP: 130/72 Pulse: 100   Resp: 16 SpO2: 100 % O2 Device: None (Room air)      Exam:  Gen: No acute distress, resting comfortably in bed.  Resp: Non-labored breathing  MSK:  LUE:  - Tender to palpation of dorsal wrist at an injection site  - Tender at the base of the 2nd-5th metacarpals extending proximally to the proximal aspect of the wrist.  Maximally tender over 2nd-3rd metacarpal.    - Non-tender over the palmar aspect of the hand   - SILT medial/radial/ulnar/axillary nerves  - Able to flex and extend all his fingers at the level of the PIP and DIP but limited by swelling and pain.  -Area of pain is warm to touch.  Did not appreciate any redness over that region.  -Pinpoint injection site on radial aspect of dorsal wrist.  No drainage, bleeding, purulence noted from that site.    Recent Labs   Lab 02/17/23  0706 02/16/23  0659 02/15/23  1049   WBC 11.2* 12.1* 11.1*   HGB 10.3* 10.9* 11.5*    327 339     Sed Rate   Date Value Ref Range Status   12/19/2020 10 0 - 20 mm/h Final         Culture results: Pending    Assessment: Andrew Collier is a 57 year old male who was admitted on 2/15/2023.  He has a medical history of IV methamphetamine abuse, anxiety, depression and HIV admitted to ED observation for antibiotic treatment or L wrist cellulitis at an IV injection site.  Transferring to inpatient internal medicine for ongoing fevers, pain and elevation in  inflammatory markers.    Plan:  Medicine Primary  - NPO at midnight  - plan pending MRI    Patient discussed with Dr. Olivares, orthopedic surgery senior resident. Orthopedic surgery attending is Dr. Vaz.     Wilfredo Palacios,   Orthopedic Surgery PGY1  Pager: [phone]     Please page me directly with any questions/concerns during regular weekday hours before 5 pm. If there is no response, if it is a weekend, or if it is during evening hours then please page the orthopedic surgery resident on call.

## 2023-02-17 NOTE — PROGRESS NOTES
Shift: [id number]  Overall patient progress:  Increased swelling and redness in  L wrist  Neuro: A&Ox4. Slept most of shift  Cardiac: SR. VSS. Low grade fever in the beginning of shift, currently afebrile   Respiratory: Sating 94% on RA, denies SOB  GI/: Adequate urine output. BM early this morning  Diet/appetite: currently NPO  Activity:  Independent/stand by, up to bathroom  Pain: endorses wrist pain, relieved by scheduled toradol  Skin: No new deficits noted.  LDA's: R PIV infusing NS at 125 ml/ hr    Plan: Continue with POC. Notify primary team with changes.

## 2023-02-17 NOTE — PROGRESS NOTES
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  I have verified the history and personally performed the physical exam and medical decision making.  I agree with the assessment and plan of care as documented in the note.      Key findings:     Feeling worse today with increased pain and swelling of his left wrist. Began overnight. No fevers overnight. Not able to eat due to severity of the pain. Did improve after scheduled Toradol this morning, but otherwise was unable to sleep due to pain last night. On exam, he appeared uncomfortable with increased swelling over the dorsal surface of his left hand with extension into the forearm. There was no appreciable crepitus. He had intact sensation and distal pulses. He was able to flex and extend his fingers but  strength was limited, as was ROM at the wrist, due to pain.     We obtained a Stat CT scan to evaluate for possible worsening necrotizing soft tissue infection, with results showing increased edema without emphysema and progression of tenosynovitis. We asked Orthopedics to reevaluate the patient as well. We will continue Vancomycin/Zosyn and low threshold to add Clindamycin if he becomes more septic.     Otherwise, continue management of diabetes with initiation of Lantus for basal insulin control with sliding scale as needed. Remainder of plans as below.    Addendum: D/w Ortho, tenosynovitis is worsening and he will likely require washout and exploration in OR on 2/18. Will make NPO and perform MRI in preparation.    Please see A&P for additional details of medical decision making.    I have personally reviewed the following data over the past 24 hrs:    11.2 (H)  \   10.3 (L)   / 325     131 (L) 101 8.5 /  201 (H)   3.3 (L) 21 (L) 0.58 (L) \       Procal: N/A CRP: 170.00 (H) Lactic Acid: 1.4           Esther Mauro MD  Date of Service  (when I saw the patient): 02/17/23    [facility]    Progress Note - Hospitalist Service, GOLD TEAM 9       Date of Admission:  2/15/2023    Assessment & Plan   Andrew Collier is a 57 year old male who was admitted on 2/15/2023.  He has a medical history of IV methamphetamine abuse, anxiety, depression and HIV admitted to ED observation for antibiotic treatment or L wrist cellulitis at an IV injection site.  Transferring to inpatient internal medicine for ongoing fevers, pain and elevation in inflammatory markers.     Changes today:   - Repeat CT hand/forearm- no evidence of abscess  - CK- 39  - Cliff toradol 30mg Q6hr for 24 hr  - Lantus 8units QHS   - Holding PTA metformin in setting of illness  - 500mL LR bolus  - RN managed electrolyte replacement    #Sepsis due to L wrist cellulitis   Up-trending inflammatory markers CRP 91->197, higher fevers 101 and ongoing pain in ED/obs, leading to admission to medicine.  Started IV Vancomycin 2/15, Ceftriaxone added 2/16.  CT 2/15 with dorsal subcutaneous edema, no abscess, possible infectious tenosynovitis.  Increased pain, erythema, and swelling 2/17 c/f possible progression of infection, but increased pain could be due to minimal PRN use overnight.  - Ortho consulting, appreciate recs  - Continue IV Vanco/Zosyn   - Elevation as able  - Follow blood cxs- NGTD  - Repeat CT hand/forearm  - Daily CBC and CRP  - Cliff toradol 30mg Q6hr for 24 hr  - PRN oxycodone 5-10mg Q4hr PRN as 1st line  - PRN dilaudid 0.5mg Q4hr as 2nd line     #Poorly controlled type II DM   Hgb A1C 12.8 on Metformin only PTA.  Per chart review, pt refuses to take insulin at home as he feels it causes SBOs. Per pt, has cost issue affording insulin and managing needles with unstable housing situation.   - Holding PTA metformin in setting of illness  - High intensity sliding scale insulin while inpatient  - Lantus 8 units QHS   - Hypoglycemia protocol   -  Glucoses qAC and qHS     #HIV infection  Diagnosed 2015, has been undetectable for last few years with relatively good adherence.  CD4 has been >200.   - Continue PTA Biktarvy     #GERD   - Continue PTA Pepcid   - Regular diet     #Hyponatremia  Likely due to sepsis and poor PO intake   - IVF NS at 125mL/hour   - 500mL LR bolus   - BMP in AM      #Hypokalemia  #Hypomagnesmia   - RN managed electrolyte replacement    #Anemia  Likely due to chronic illness and infection.  Hgb stable in 10s. No s/s of bleeding  - CBC in AM      #DAVID  #MDD  #Methamphetamine abuse, in acute withdrawal   - Continue  PRN hydroxyzine, melatonin, olanzapine ODT        Diet: Regular Diet Adult    DVT Prophylaxis: Lovenox  Potts Catheter: Not present  Fluids: mIVF  Lines: None     Cardiac Monitoring: None  Code Status: Full Code      Disposition Plan      Expected Discharge Date: 02/18/2023        Discharge Comments: pending labs, vitals in a.m.        The patient's care was discussed with the Attending Physician, Dr. Esther Mauro.    Noemi Vona  Medical Student  Hospitalist Service, [address]  Securely message with [employer] (more info)  Text page via [facility] Paging/Directory   See signed in provider for up to date coverage information  ______________________________________________________________________    Interval History   Increased pain overnight. Triggered sepsis protocol, lactate 1.4.    This morning, Andrew is in a lot more pain. Pain is only in his left hand. He thinks he can move his wrist, but he doesn't want to attempt given pain. No numbness or tingling in left hand. Endorses fevers and chills. Has a new cough this morning, but no shortness of breath. Has not eaten yet because of pain. Has bowel movement this morning. No changes in urination.     Physical Exam   Vital Signs: Temp: 100  F (37.8  C) Temp src: Oral BP: 130/72 Pulse: 100   Resp: 16 SpO2: 100 % O2 Device:  None (Room air)    Weight: 149 lbs 11.08 oz    Constitutional:  More tired appearing and somewhat sleepy, but rouses to voice. In some distress due to pain  Respiratory: Intermittent, productive sounding cough. No increased work of breathing. Breathing comfortably on room air. Lungs clear to auscultation.   Cardiovascular: RRR, well perfused.   GI: Abdomen soft, non-distended, non-tender.   Musculoskeletal: Left hand more swollen appearing compared to yesterday but not rock-hard. Dorsum of left hand erythematous. Dorsal hand, wrist, and dorsal forearm extremely tender, even to light touch. Able to move fingers on left hand. Refuses to attempt making a fist or flexion/extension at the wrist due to pain.   Neurologic: Alert and oriented. Spontaneously moves all extremities. Cranial nerves grossly intact.     Data     I have personally reviewed the following data over the past 24 hrs:    11.2 (H)  \   10.3 (L)   / 325     131 (L) 101 8.5 /  185 (H)   3.3 (L) 21 (L) 0.58 (L) \       Procal: N/A CRP: 170.00 (H) Lactic Acid: 1.4         Imaging results reviewed over the past 24 hrs:   No results found for this or any previous visit (from the past 24 hour(s)).

## 2023-02-17 NOTE — PROGRESS NOTES
"CLINICAL NUTRITION SERVICES - ASSESSMENT NOTE     Nutrition Prescription    RECOMMENDATIONS FOR MDs/PROVIDERS TO ORDER:  Recommend ensure enlive twice daily when diet advanced  Recommend thera-vit-m     Malnutrition Status:    Unable to determine due to incomplete NFPE    Future/Additional Recommendations:  -- monitor oral intake/diet advancement and the need for nutritional supplements  -- obtain nutrition history/NFPE      REASON FOR ASSESSMENT  Andrew Collier is a/an 57 year old male assessed by the dietitian for Admission Nutrition Risk Screen for positive    NUTRITION HISTORY  Andrew was sleeping when this writer attempted to visit. Per chart review patient with decreased po intake due to pain. Did not eat last evening.     CURRENT NUTRITION ORDERS  Diet: NPO  Intake/Tolerance: poor intake     LABS  Labs reviewed  (2/17): K+ 3.3 mmol/L (L), BUN 8.5 mg/dL, Cr 0.58 mg/dL (L), Mg++ 1.6 mg/dL (L)    MEDICATIONS  Medications reviewed  Novolog (rapid acting)  Klor-con    ANTHROPOMETRICS  Height: 160 cm (5' 3\")  Most Recent Weight: 67.9 kg (149 lb 11.1 oz)    IBW: 56.4 kg  BMI: Overweight BMI 25-29.9  Weight History:   Wt Readings from Last 10 Encounters:   02/15/23 67.9 kg (149 lb 11.1 oz)   02/06/23 68 kg (150 lb)   01/25/23 63.5 kg (140 lb)   01/24/23 63.5 kg (140 lb)   12/23/22 63.5 kg (140 lb)   12/17/22 63.5 kg (140 lb)   12/15/22 63.9 kg (140 lb 12.8 oz)   12/09/22 67.2 kg (148 lb 2.4 oz)   11/18/22 67.1 kg (148 lb 0.2 oz)   09/08/22 68 kg (150 lb)   no recent weight loss   Dosing Weight: 59 kg (adjBW based on IBW and admit weight)     ASSESSED NUTRITION NEEDS  Estimated Energy Needs: 0592-0654 kcals/day (25 - 30 kcals/kg)  Justification: Maintenance  Estimated Protein Needs: 71-89 grams protein/day (1.2 - 1.5 grams of pro/kg)  Justification: Increased needs  Estimated Fluid Needs: (1 mL/kcal)   Justification: Maintenance    PHYSICAL FINDINGS  See malnutrition section below.    MALNUTRITION  % Intake: " Unable to assess  % Weight Loss: None noted  Subcutaneous Fat Loss: Facial region:  None observed  Muscle Loss: Temporal:  Mild - visualized only   Fluid Accumulation/Edema: None noted  Malnutrition Diagnosis: Unable to determine due to incomplete NFPE     NUTRITION DIAGNOSIS  Inadequate oral intake related to decreased appetite, pain as evidenced by minimal oral intake     INTERVENTIONS  Implementation  Nutrition Education: Unable to complete due to patient sleeping      Goals  Diet adv v nutrition support within 2-3 days.     Monitoring/Evaluation  Progress toward goals will be monitored and evaluated per protocol.    Shiloh Hollis, MS/RD/LD  [address]/Obs RD pager: [phone]  Weekend/Holiday RD pager: [phone]

## 2023-02-17 NOTE — PROGRESS NOTES
Brief Social Work Progress Note    Information Obtained: Writer attempted twice to meet with pt to complete CMA. Pt declined both times.    Follow-Up Plan: Social work will continue to follow and provide assistance to ensure a safe and timely discharge.     ________________    RADHA Lehman, [facility]  ED/Observation   [facility]  Phone: [phone]  Pager: [phone]  Fax: [phone]    On-call pager, [phone], 4:00pm to midnight

## 2023-02-17 NOTE — PROGRESS NOTES
"Pt triggered sepsis. Lactic ordered stat. Provider notified  Vitals RE:  /62 (BP Location: Right arm, Patient Position: Semi-Post's)   Pulse 100   Temp 99.6  F (37.6  C) (Oral)   Resp 17   Ht 1.6 m (5' 3\")   Wt 67.9 kg (149 lb 11.1 oz)   SpO2 100%   BMI 26.52 kg/m    "

## 2023-02-17 NOTE — PLAN OF CARE
"Goal Outcome Evaluation:  /72 (BP Location: Right arm, Patient Position: Semi-Post's)   Pulse 100   Temp 100  F (37.8  C) (Oral)   Resp 16   Ht 1.6 m (5' 3\")   Wt 67.9 kg (149 lb 11.1 oz)   SpO2 100%   BMI 26.52 kg/m      -diagnostic tests and consults completed and resulted: Met.  -vital signs normal or at patient baseline: Not met, pt running low grade fever and triggered sepsis at 0240.  -tolerating oral intake to maintain hydration: Not met, pt on  mL/hr  -adequate pain control on oral analgesics: Not met, pt requested IV dilaudid after atarax was unsuccessful.  -tolerating oral antibiotics or has plans for home infusion setup: Not met, pt receiving ZOSYN q6h and Vancomycin q12h  -infection is improving: Not met  "

## 2023-02-17 NOTE — DISCHARGE INSTRUCTIONS
Please make an appointment to follow up with Your Primary Care Provider as soon as possible if you have any concerns.     show

## 2023-02-17 NOTE — PLAN OF CARE
"Goal Outcome Evaluation:  /62 (BP Location: Right arm, Patient Position: Semi-Post's)   Pulse 100   Temp 99.6  F (37.6  C) (Oral)   Resp 17   Ht 1.6 m (5' 3\")   Wt 67.9 kg (149 lb 11.1 oz)   SpO2 100%   BMI 26.52 kg/m      -diagnostic tests and consults completed and resulted: Met.  -vital signs normal or at patient baseline: Met.  -tolerating oral intake to maintain hydration: Met.  -adequate pain control on oral analgesics: met.  -tolerating oral antibiotics or has plans for home infusion setup: Not met.  -infection is improving: Not met  "

## 2023-02-18 ENCOUNTER — APPOINTMENT (OUTPATIENT)
Dept: MRI IMAGING | Facility: CLINIC | Age: 58
End: 2023-02-18
Payer: MEDICAID

## 2023-02-18 LAB
ANION GAP SERPL CALCULATED.3IONS-SCNC: 8 MMOL/L (ref 7–15)
BUN SERPL-MCNC: 14 MG/DL (ref 8–23)
CALCIUM SERPL-MCNC: 9 MG/DL (ref 8.6–10)
CHLORIDE SERPL-SCNC: 108 MMOL/L (ref 98–107)
CREAT SERPL-MCNC: 0.78 MG/DL (ref 0.67–1.17)
CRP SERPL-MCNC: 126 MG/L
DEPRECATED HCO3 PLAS-SCNC: 21 MMOL/L (ref 22–29)
ERYTHROCYTE [DISTWIDTH] IN BLOOD BY AUTOMATED COUNT: 12.2 % (ref 10–15)
GFR SERPL CREATININE-BSD FRML MDRD: >90 ML/MIN/1.73M2
GLUCOSE BLDC GLUCOMTR-MCNC: 262 MG/DL (ref 70–99)
GLUCOSE BLDC GLUCOMTR-MCNC: 268 MG/DL (ref 70–99)
GLUCOSE BLDC GLUCOMTR-MCNC: 277 MG/DL (ref 70–99)
GLUCOSE BLDC GLUCOMTR-MCNC: 297 MG/DL (ref 70–99)
GLUCOSE BLDC GLUCOMTR-MCNC: 298 MG/DL (ref 70–99)
GLUCOSE BLDC GLUCOMTR-MCNC: 301 MG/DL (ref 70–99)
GLUCOSE BLDC GLUCOMTR-MCNC: 312 MG/DL (ref 70–99)
GLUCOSE BLDC GLUCOMTR-MCNC: 381 MG/DL (ref 70–99)
GLUCOSE SERPL-MCNC: 278 MG/DL (ref 70–99)
HCT VFR BLD AUTO: 30.5 % (ref 40–53)
HGB BLD-MCNC: 10.1 G/DL (ref 13.3–17.7)
MAGNESIUM SERPL-MCNC: 1.7 MG/DL (ref 1.7–2.3)
MCH RBC QN AUTO: 28.8 PG (ref 26.5–33)
MCHC RBC AUTO-ENTMCNC: 33.1 G/DL (ref 31.5–36.5)
MCV RBC AUTO: 87 FL (ref 78–100)
PLATELET # BLD AUTO: 362 10E3/UL (ref 150–450)
POTASSIUM SERPL-SCNC: 3.7 MMOL/L (ref 3.4–5.3)
RBC # BLD AUTO: 3.51 10E6/UL (ref 4.4–5.9)
SODIUM SERPL-SCNC: 137 MMOL/L (ref 136–145)
VANCOMYCIN SERPL-MCNC: 16.9 UG/ML
WBC # BLD AUTO: 8.5 10E3/UL (ref 4–11)

## 2023-02-18 PROCEDURE — 250N000011 HC RX IP 250 OP 636: Performed by: INTERNAL MEDICINE

## 2023-02-18 PROCEDURE — 80202 ASSAY OF VANCOMYCIN: CPT | Performed by: INTERNAL MEDICINE

## 2023-02-18 PROCEDURE — 255N000002 HC RX 255 OP 636: Performed by: INTERNAL MEDICINE

## 2023-02-18 PROCEDURE — 83735 ASSAY OF MAGNESIUM: CPT | Performed by: INTERNAL MEDICINE

## 2023-02-18 PROCEDURE — 73223 MRI JOINT UPR EXTR W/O&W/DYE: CPT | Mod: LT

## 2023-02-18 PROCEDURE — 258N000003 HC RX IP 258 OP 636: Performed by: INTERNAL MEDICINE

## 2023-02-18 PROCEDURE — 250N000013 HC RX MED GY IP 250 OP 250 PS 637: Performed by: INTERNAL MEDICINE

## 2023-02-18 PROCEDURE — 80048 BASIC METABOLIC PNL TOTAL CA: CPT | Performed by: NURSE PRACTITIONER

## 2023-02-18 PROCEDURE — 36415 COLL VENOUS BLD VENIPUNCTURE: CPT | Performed by: NURSE PRACTITIONER

## 2023-02-18 PROCEDURE — 250N000013 HC RX MED GY IP 250 OP 250 PS 637: Performed by: PHYSICIAN ASSISTANT

## 2023-02-18 PROCEDURE — 85014 HEMATOCRIT: CPT | Performed by: NURSE PRACTITIONER

## 2023-02-18 PROCEDURE — 73220 MRI UPPR EXTREMITY W/O&W/DYE: CPT | Mod: 26 | Performed by: RADIOLOGY

## 2023-02-18 PROCEDURE — 73220 MRI UPPR EXTREMITY W/O&W/DYE: CPT | Mod: LT

## 2023-02-18 PROCEDURE — 99232 SBSQ HOSP IP/OBS MODERATE 35: CPT | Performed by: INTERNAL MEDICINE

## 2023-02-18 PROCEDURE — 73223 MRI JOINT UPR EXTR W/O&W/DYE: CPT | Mod: 26 | Performed by: RADIOLOGY

## 2023-02-18 PROCEDURE — A9585 GADOBUTROL INJECTION: HCPCS | Performed by: INTERNAL MEDICINE

## 2023-02-18 PROCEDURE — 250N000011 HC RX IP 250 OP 636: Performed by: NURSE PRACTITIONER

## 2023-02-18 PROCEDURE — 86140 C-REACTIVE PROTEIN: CPT | Performed by: NURSE PRACTITIONER

## 2023-02-18 PROCEDURE — 120N000002 HC R&B MED SURG/OB UMMC

## 2023-02-18 PROCEDURE — 36415 COLL VENOUS BLD VENIPUNCTURE: CPT | Performed by: INTERNAL MEDICINE

## 2023-02-18 PROCEDURE — 258N000003 HC RX IP 258 OP 636: Performed by: NURSE PRACTITIONER

## 2023-02-18 RX ORDER — KETOROLAC TROMETHAMINE 30 MG/ML
30 INJECTION, SOLUTION INTRAMUSCULAR; INTRAVENOUS EVERY 6 HOURS PRN
Status: DISCONTINUED | OUTPATIENT
Start: 2023-02-18 | End: 2023-02-20 | Stop reason: HOSPADM

## 2023-02-18 RX ORDER — GADOBUTROL 604.72 MG/ML
7.5 INJECTION INTRAVENOUS ONCE
Status: COMPLETED | OUTPATIENT
Start: 2023-02-18 | End: 2023-02-18

## 2023-02-18 RX ORDER — GLIPIZIDE 5 MG/1
5 TABLET ORAL
Status: DISCONTINUED | OUTPATIENT
Start: 2023-02-18 | End: 2023-02-19 | Stop reason: DRUGHIGH

## 2023-02-18 RX ORDER — BUPROPION HYDROCHLORIDE 150 MG/1
150 TABLET ORAL DAILY
Status: DISCONTINUED | OUTPATIENT
Start: 2023-02-18 | End: 2023-02-20 | Stop reason: HOSPADM

## 2023-02-18 RX ADMIN — PIPERACILLIN AND TAZOBACTAM 3.38 G: 3; .375 INJECTION, POWDER, LYOPHILIZED, FOR SOLUTION INTRAVENOUS at 09:45

## 2023-02-18 RX ADMIN — KETOROLAC TROMETHAMINE 30 MG: 30 INJECTION, SOLUTION INTRAMUSCULAR; INTRAVENOUS at 09:44

## 2023-02-18 RX ADMIN — BUPROPION HYDROCHLORIDE 150 MG: 150 TABLET, FILM COATED, EXTENDED RELEASE ORAL at 12:14

## 2023-02-18 RX ADMIN — PIPERACILLIN AND TAZOBACTAM 3.38 G: 3; .375 INJECTION, POWDER, LYOPHILIZED, FOR SOLUTION INTRAVENOUS at 22:35

## 2023-02-18 RX ADMIN — VANCOMYCIN HYDROCHLORIDE 1250 MG: 10 INJECTION, POWDER, LYOPHILIZED, FOR SOLUTION INTRAVENOUS at 02:22

## 2023-02-18 RX ADMIN — HYDROMORPHONE HYDROCHLORIDE 0.5 MG: 1 INJECTION, SOLUTION INTRAMUSCULAR; INTRAVENOUS; SUBCUTANEOUS at 12:14

## 2023-02-18 RX ADMIN — VANCOMYCIN HYDROCHLORIDE 1250 MG: 10 INJECTION, POWDER, LYOPHILIZED, FOR SOLUTION INTRAVENOUS at 13:52

## 2023-02-18 RX ADMIN — GLIPIZIDE 5 MG: 5 TABLET ORAL at 12:14

## 2023-02-18 RX ADMIN — INSULIN ASPART 7 UNITS: 100 INJECTION, SOLUTION INTRAVENOUS; SUBCUTANEOUS at 18:37

## 2023-02-18 RX ADMIN — KETOROLAC TROMETHAMINE 30 MG: 30 INJECTION, SOLUTION INTRAMUSCULAR; INTRAVENOUS at 04:54

## 2023-02-18 RX ADMIN — FAMOTIDINE 40 MG: 20 TABLET ORAL at 09:44

## 2023-02-18 RX ADMIN — KETOROLAC TROMETHAMINE 30 MG: 30 INJECTION, SOLUTION INTRAMUSCULAR; INTRAVENOUS at 16:15

## 2023-02-18 RX ADMIN — ENOXAPARIN SODIUM 40 MG: 40 INJECTION SUBCUTANEOUS at 16:14

## 2023-02-18 RX ADMIN — GADOBUTROL 7.5 ML: 604.72 INJECTION INTRAVENOUS at 11:43

## 2023-02-18 RX ADMIN — PIPERACILLIN AND TAZOBACTAM 3.38 G: 3; .375 INJECTION, POWDER, LYOPHILIZED, FOR SOLUTION INTRAVENOUS at 16:02

## 2023-02-18 RX ADMIN — INSULIN ASPART 7 UNITS: 100 INJECTION, SOLUTION INTRAVENOUS; SUBCUTANEOUS at 12:17

## 2023-02-18 RX ADMIN — KETOROLAC TROMETHAMINE 30 MG: 30 INJECTION, SOLUTION INTRAMUSCULAR; INTRAVENOUS at 22:26

## 2023-02-18 RX ADMIN — BICTEGRAVIR SODIUM, EMTRICITABINE, AND TENOFOVIR ALAFENAMIDE FUMARATE 1 TABLET: 50; 200; 25 TABLET ORAL at 09:44

## 2023-02-18 RX ADMIN — PIPERACILLIN AND TAZOBACTAM 3.38 G: 3; .375 INJECTION, POWDER, LYOPHILIZED, FOR SOLUTION INTRAVENOUS at 04:17

## 2023-02-18 RX ADMIN — INSULIN ASPART 6 UNITS: 100 INJECTION, SOLUTION INTRAVENOUS; SUBCUTANEOUS at 09:43

## 2023-02-18 RX ADMIN — SODIUM CHLORIDE: 9 INJECTION, SOLUTION INTRAVENOUS at 16:02

## 2023-02-18 RX ADMIN — SENNOSIDES AND DOCUSATE SODIUM 1 TABLET: 50; 8.6 TABLET ORAL at 09:44

## 2023-02-18 RX ADMIN — SODIUM CHLORIDE: 9 INJECTION, SOLUTION INTRAVENOUS at 02:02

## 2023-02-18 ASSESSMENT — ACTIVITIES OF DAILY LIVING (ADL)
ADLS_ACUITY_SCORE: 26

## 2023-02-18 NOTE — PROGRESS NOTES
"Care Management Follow Up    Length of Stay (days): 2    Expected Discharge Date: 02/19/2023     Concerns to be Addressed: all concerns addressed in this encounter     Patient plan of care discussed at interdisciplinary rounds: Yes    Anticipated Discharge Disposition:  TBD     Anticipated Discharge Services:   Anticipated Discharge DME:      Patient/family educated on Medicare website which has current facility and service quality ratings: no  Education Provided on the Discharge Plan:  N/A  Patient/Family in Agreement with the Plan: unable to assess    Referrals Placed by CM/SW: None    Private pay costs discussed: Not applicable    Additional Information:  Chart reviewed. Case discussed with bedside RN and medical provider.    1528  SW met with pt at bedside, introduced self and explained the reason for the visit (initial assessment). Pt agreed to speak with SW. SW asked for pt to verify his address. Pt share he does not have a home. The address is [facility] it's a shelter. Pt has a room there and has been there for over a year. Pt asked if SW have a backpack and jacket for him. SW express unfortunately the [facility] care amangement department does not provide backpacks and jackets. Pt said other hospitals gives him backpacks and jacket all the time. Pt became upset saying, \"if you are a  and you don't have backpack and jacket for me then you can't help me. I don't need to answer anymore of your questions\". Pt ask SW to leave. SW left pt's room.      SW was not able to complete CMA.      available and will continue to follow for discharge planning and supports as needed.     _______________________    FLORENCE Hope, LSW  ED/OBS   [facility]  Phone: [phone]  Pager: [phone]  Fax: [phone]     On-call pager, [phone], 4:00 pm to midnight      "

## 2023-02-18 NOTE — PLAN OF CARE
Assumed cares from 0709-8751.    Reason for admission: 2/15 cellulitis L wrist    VS: stable on room air  Pain/Nausea: L wrist pain, given dilaudid x1 along with scheduled meds and reports fair relief. Denies nausea.  Neuro: A&Ox4, weakness in L wrist, otherwise intact  Cardiac: WDL  Resp: WDL on room air  GI/: +BS, passing flatus, no BM this shift, voids spontaneously using bedside urinal.  Skin: slight edema on L wrist, otherwise intact.  Diet: tolerating regular diet    Activity: independent  LDA: R PIV infusing NS @ 125 ml/hr with intermittent abx  Labs: reviewed    Plan: continue POC  Goal Outcome Evaluation:      Plan of Care Reviewed With: patient    Overall Patient Progress: no changeOverall Patient Progress: no change    Outcome Evaluation: pain control maintained, good oral intake, stable mood

## 2023-02-18 NOTE — PHARMACY-VANCOMYCIN DOSING SERVICE
Pharmacy Vancomycin Note  Date of Service 2023  Patient's  1965   57 year old, male    Indication: Skin and Soft Tissue Infection  Day of Therapy: 4  Current vancomycin regimen:  1250 mg IV q8h  Current vancomycin monitoring method: AUC  Current vancomycin therapeutic monitoring goal: 400-600 mg*h/L    [employer] Prediction of Current Vancomycin Regimen  Loading dose: N/A  Regimen: 1250 mg IV every 8 hours.  Start time: 10:47 on 2023  Exposure target: AUC24 (range)400-600 mg/L.hr   AUC24,ss: 728 mg/L.hr  Probability of AUC24 > 400: 100 %  Ctrough,ss: 21.7 mg/L  Probability of Ctrough,ss > 20: 68 %  Probability of nephrotoxicity (Lodise CISCO ): 20 %      Current estimated CrCl = Estimated Creatinine Clearance: 97.9 mL/min (based on SCr of 0.78 mg/dL).    Creatinine for last 3 days  2023:  6:59 AM Creatinine 0.61 mg/dL  2023:  7:06 AM Creatinine 0.58 mg/dL  2023:  7:50 AM Creatinine 0.78 mg/dL    Recent Vancomycin Levels (past 3 days)  2023:  7:06 AM Vancomycin 9.6 ug/mL  2023: 10:00 AM Vancomycin 16.9 ug/mL    Vancomycin IV Administrations (past 72 hours)                   vancomycin (VANCOCIN) 1,250 mg in 0.9% NaCl 250 mL intermittent infusion (mg) 1,250 mg New Bag 23 0222     1,250 mg New Bag 23 1647     1,250 mg New Bag  0933    vancomycin (VANCOCIN) 1,250 mg in 0.9% NaCl 250 mL intermittent infusion (mg) 1,250 mg New Bag 23 2336     1,250 mg New Bag  1041     1,250 mg New Bag 02/15/23 2336                Nephrotoxins and other renal medications (From now, onward)    Start     Dose/Rate Route Frequency Ordered Stop    23 1430  vancomycin (VANCOCIN) 1,250 mg in 0.9% NaCl 250 mL intermittent infusion         1,250 mg  over 90 Minutes Intravenous EVERY 12 HOURS 23 1049      23 1600  ketorolac (TORADOL) injection 30 mg         30 mg Intravenous EVERY 6 HOURS 23 1052 23 2159    23 1400  piperacillin-tazobactam  "(ZOSYN) 3.375 g vial to attach to  mL bag        Note to Pharmacy: For [facility], [facility] and [facility]: For Zosyn-naive patients, use the \"Zosyn initial dose + extended infusion\" order panel.    3.375 g  over 30 Minutes Intravenous EVERY 6 HOURS 02/16/23 1346               Contrast Orders - past 72 hours (72h ago, onward)    Start     Dose/Rate Route Frequency Stop    02/17/23 1300  iopamidol (ISOVUE-370) solution 90 mL         90 mL Intravenous ONCE 02/17/23 1315    02/15/23 1400  iopamidol (ISOVUE-370) solution 92 mL         92 mL Intravenous ONCE 02/15/23 1409          Interpretation of levels and current regimen:  Vancomycin level is reflective of AUC greater than 600    Has serum creatinine changed greater than 50% in last 72 hours: No    Urine output:  good urine output    Renal Function: Stable    [employer] Prediction of Planned New Vancomycin Regimen  Loading dose: N/A  Regimen: 1250 mg IV every 12 hours.  Start time: 10:47 on 02/18/2023  Exposure target: AUC24 (range)400-600 mg/L.hr   AUC24,ss: 487 mg/L.hr  Probability of AUC24 > 400: 90 %  Ctrough,ss: 12.6 mg/L  Probability of Ctrough,ss > 20: 1 %  Probability of nephrotoxicity (Lodise CISCO 2009): 8 %    Plan:  1. Resume Vancomycin 1250 mg q12h  2. Vancomycin monitoring method: AUC  3. Vancomycin therapeutic monitoring goal: 400-600 mg*h/L  4. Pharmacy will check vancomycin levels as appropriate in 1-3 Days.  5. Serum creatinine levels will be ordered daily for the first week of therapy and at least twice weekly for subsequent weeks.    Katherine Vick [facility]  "

## 2023-02-18 NOTE — PLAN OF CARE
"Goal Outcome Evaluation:  Status: cellulitis of left wrist   Vitals: /67 (BP Location: Right leg)   Pulse 89   Temp 98.4  F (36.9  C) (Oral)   Resp 16   Ht 1.6 m (5' 3\")   Wt 67.9 kg (149 lb 11.1 oz)   SpO2 94%   BMI 26.52 kg/m      Pt is currently refusing VS    Neuros: A & O X4. Able to make his needs known   IV: PIV right NS at 125 ml/ hr   Labs/Electrolytes: k 3.3. pt needs replacement, refusing currently , will re approach   Resp/trach: RA, denies SOB   Diet: Reg diet. Needs help ordering meal   Bowel status: no BM   : voiding using urinal   Skin: unable to visualize   Pain: Denies at this time   Activity: independent   Plan: NPO after midnight for IR.         Plan of Care Reviewed With: patient    Overall Patient Progress: no changeOverall Patient Progress: no change           "

## 2023-02-18 NOTE — PROGRESS NOTES
[facility]    Medicine Progress Note - Hospitalist Service, GOLD TEAM 9    Date of Admission:  2/15/2023    Assessment & Plan   Andrew Collier is a 57 year old male who was admitted on 2/15/2023.  He has a medical history of IV methamphetamine abuse, anxiety, depression and HIV admitted to ED observation for antibiotic treatment or L wrist cellulitis at an IV injection site.  Transferring to inpatient internal medicine for ongoing fevers, pain and elevation in inflammatory markers.     Changes today:   - Appreciate Ortho input, clinically improving, no plans for OR  - MRI pending  - Continue IV Vancomycin and Zosyn for now, hopeful to de-escalate in coming days  - Continue Toradol for pain control   - Started Glipizide   - Started Wellbutrin     #Sepsis due to L wrist cellulitis   #Infectious tenosynotivits  Began after attempting to inject meth into dorsal hand vein - pt states he noticed swelling of his dorsal hand and significant pain afterwards. Initially admitted to ED Obs with Orthopedics consult - initial CT AP showed dorsal subcutaneous edema without abscess and infectious tenosynovitis. Pain increased with uptrending inflammatory markers CRP 91->197, higher fevers 101 and ongoing pain in ED/obs, leading to admission to medicine. Pain increased on 2/17 leading to repeat CT scan showing progression of tenosynovitis but still no abscess or subcutaneous emphysema.  - Ortho consulting, appreciate recs   - Given clinical improvement, electing to monitor with close surveillance for now   - MRI left hand/wrist pending  - Antimicrobial therapy:   - Vancomycin (2/15-)    - CTX (2/16)   - Zosyn (2/16-)  - Elevation as able  - BCx 2/16 NGTD  - Daily CBC and CRP  - Pain control:   - Continue Toradol 30mg Q6hr PRN   - PRN oxycodone 5-10mg Q4hr PRN as 1st line   - PRN dilaudid 0.5mg Q4hr as 2nd line     #Poorly controlled type II DM   Hgb A1C 12.8 on Metformin only  "PTA.  Per chart review, pt refuses to take insulin at home as he feels it causes SBOs. Per pt, has cost issue affording insulin and managing needles with unstable housing situation. Will need to reassess closer to discharge  - Holding PTA metformin in setting of illness  - High intensity sliding scale insulin while inpatient  - Hypoglycemia protocol   - Glucoses qAC and at bedtime  - Stopped Lantus, added Glipizide on 2/18 to facilitate outpatient transition      #HIV infection  Diagnosed 2015, has been undetectable for last few years with relatively good adherence.  CD4 has been >200.   - Continue PTA Biktarvy     #GERD   - Continue PTA Pepcid   - Regular diet     #Hyponatremia  Likely due to sepsis and poor PO intake   - IVF NS at 125mL/hour   - 500mL LR bolus   - BMP in AM      #Hypokalemia  #Hypomagnesmia   - RN managed electrolyte replacement    #Anemia  Likely due to chronic illness and infection.  Hgb stable in 10s. No s/s of bleeding  - CBC in AM      #DAVID  #MDD  #Methamphetamine abuse, in acute withdrawal   Pt endorses long history of meth use though endorses some prolonged periods of sobriety. Mostly smoking it, but tried injecting it before coming into the hospital and understands this was a \"terrible\" mistake. States he takes Meth to help with energy levels. Discussed options for treatment including rehab, or trial of medication to help with energy (I.e. wellbutrin). He is open to trying this.  - Started Wellbutrin 150 mg XR daily  - Continue PRN hydroxyzine, melatonin, olanzapine ODT  - Not interested in treatment at this time, has OP follow-up in ID clinic         Diet: NPO per Anesthesia Guidelines for Procedure/Surgery Except for: Meds    DVT Prophylaxis: Enoxaparin (Lovenox) SQ  Potts Catheter: Not present  Lines: None     Cardiac Monitoring: None  Code Status: Full Code      Clinically Significant Risk Factors        # Hypokalemia: Lowest K = 3.3 mmol/L in last 2 days, will replace as needed     # " "Hypomagnesemia: Lowest Mg = 1.6 mg/dL in last 2 days, will replace as needed            # DMII: A1C = 12.8 % (Ref range: <5.7 %) within past 3 months, PRESENT ON ADMISSION  # Overweight: Estimated body mass index is 26.52 kg/m  as calculated from the following:    Height as of this encounter: 1.6 m (5' 3\").    Weight as of this encounter: 67.9 kg (149 lb 11.1 oz)., PRESENT ON ADMISSION         Disposition Plan      Expected Discharge Date: 02/18/2023      Destination: shelter  Discharge Comments: pending labs, vitals in a.m.          Esther Mauro MD  Hospitalist Service, [address]  Securely message with [employer] (more info)  Text page via [employer] Paging/Directory   See signed in provider for up to date coverage information  ______________________________________________________________________    Interval History   Doing much better today. States wrist pain much improved overnight. Pleased to be able to move his arm with less discomfort. Denies any nausea/vomiting - in fact has a very strong appetite. No fevers/chills overnight either. We discussed diabetes control outside the hospital and he notes difficulty with insulin at the shelter due to lack of refrigeration. Also discussed use of meth - mostly for energy. He is open to trying a pill to help with this. Declines any desire to seek treatment.    Physical Exam   Vital Signs: Temp: 98.3  F (36.8  C) Temp src: Oral BP: 131/76 Pulse: 90   Resp: 16 SpO2: 99 % O2 Device: None (Room air)    Weight: 149 lbs 11.08 oz    General Appearance: Pleasant middle aged male, resting comfortably, NAD  Respiratory: Breathing comfortable on room air, lungs grossly CTAB  Cardiovascular: RRR, no m/r/g.  GI: soft, non-distended, non-tender to palpation, BS+  Skin: no skin rash or pallor  Other: Left dorsum of hand with improved erythema and edema compared with yesterday, also with increased ROM of the fingers of the left " hand and improved ROM of wrist flexion/extension.     Medical Decision Making       45 MINUTES SPENT BY ME on the date of service doing chart review, history, exam, documentation & further activities per the note.      Data     I have personally reviewed the following data over the past 24 hrs:    8.5  \   10.1 (L)   / 362     137 108 (H) 14.0 /  312 (H)   3.7 21 (L) 0.78 \       Procal: N/A CRP: 126.00 (H) Lactic Acid: N/A         Imaging results reviewed over the past 24 hrs:   Recent Results (from the past 24 hour(s))   CT Forearm Left w Contrast    Narrative    EXAM: CT FOREARM LEFT W CONTRAST, CT HAND LEFT W CONTRAST  2/17/2023  1:43 PM      HISTORY: hx of IVDU, now with pain, swelling, and pain out of  propotion to exam, eval for necrotizing soft tissue infection    COMPARISON: CT 2/15/2023    TECHNIQUE: CT imaging of the left forearm and hand after the  administration of IV contrast.     Contrast: 90 mL Isovue-370     FINDINGS:     No acute osseous abnormality. Borderline ulnar negative variance. No  substantial degenerative change.    Increased subcutaneous edema now extends from elbow joint to the  dorsal hand wrist at the level of metacarpophalangeal joint.     No focal rim-enhancing fluid collection. Redemonstration of mild  tenosynovial fluid at the intersection of the second and third  extensor compartments. No subcutaneous emphysema.       Impression    IMPRESSION:     1. Increased subcutaneous edema now extends from elbow joint to the  dorsal hand wrist at the level of metacarpophalangeal joint when  compared to CT from 2/15/2023. No focal rim-enhancing fluid  collection. No subcutaneous emphysema.    2. Mild progression of tenosynovitis at the intersection of the second  and third extensor compartments most substantially affecting the  distal extensor carpi radialis brevis since prior CT. Infectious  tenosynovitis is again in the differential.    I have personally reviewed the examination and initial  interpretation  and I agree with the findings.    MARGIE GODWIN MD (Joe)         SYSTEM ID:  [id number]   CT Hand Left w Contrast    Narrative    EXAM: CT FOREARM LEFT W CONTRAST, CT HAND LEFT W CONTRAST  2/17/2023  1:43 PM      HISTORY: hx of IVDU, now with pain, swelling, and pain out of  propotion to exam, eval for necrotizing soft tissue infection    COMPARISON: CT 2/15/2023    TECHNIQUE: CT imaging of the left forearm and hand after the  administration of IV contrast.     Contrast: 90 mL Isovue-370     FINDINGS:     No acute osseous abnormality. Borderline ulnar negative variance. No  substantial degenerative change.    Increased subcutaneous edema now extends from elbow joint to the  dorsal hand wrist at the level of metacarpophalangeal joint.     No focal rim-enhancing fluid collection. Redemonstration of mild  tenosynovial fluid at the intersection of the second and third  extensor compartments. No subcutaneous emphysema.       Impression    IMPRESSION:     1. Increased subcutaneous edema now extends from elbow joint to the  dorsal hand wrist at the level of metacarpophalangeal joint when  compared to CT from 2/15/2023. No focal rim-enhancing fluid  collection. No subcutaneous emphysema.    2. Mild progression of tenosynovitis at the intersection of the second  and third extensor compartments most substantially affecting the  distal extensor carpi radialis brevis since prior CT. Infectious  tenosynovitis is again in the differential.    I have personally reviewed the examination and initial interpretation  and I agree with the findings.    MARGIE GODWIN MD (Joe)         SYSTEM ID:  [id number]

## 2023-02-18 NOTE — SUMMARY OF CARE
"At 1815 pt was in his room laying in his bed. Nurse went in the room to conducting assessment for MRI check list.    At the time pt asked for snack, writer went and got him a snack. He again asked for the more snacks, nurse explained that he needs his blood sugar checked before he eats. Pt started being verbally abusive to the writer calling the writer the  \"F\"  word Pt ask the writer to leave the room. Writer left the room and updated Charge nurse of the  incident. Pt refused vital sign and BS at this time.   "

## 2023-02-18 NOTE — PROGRESS NOTES
Orthopaedic Surgery Progress Note 02/18/2023    S: Patient states that he feels significantly better this morning with increased ROM and decreased pain. Continues to endorse pain on the dorsal aspect of the hand but feels it is improving. Denies numbness. Denies fever/chills.       O:  Temp: 98.3  F (36.8  C) Temp src: Oral BP: 131/76 Pulse: 90   Resp: 16 SpO2: 99 % O2 Device: None (Room air)      Exam:  Gen: No acute distress, resting comfortably in bed.  Resp: Non-labored breathing  MSK:  LUE: exam consistent with previous, but improved   - Tender at the base of the 2nd-4th metacarpals extending proximally to the wrist.  Maximally tender over 2nd-3rd metacarpal. Improved from yesterdays exam. ROM improved both passively and actively compared to yesterday. Able to fully extend all digits. Able to flex all digits but cannot close fist. Can passively flex SF and RF to maximal flexion without pain. When passively flexing IF and MF to maximal flexion patient has pain.   - Non-tender over the palmar aspect of the hand   - SILT medial/radial/ulnar/axillary nerves    Recent Labs   Lab 02/17/23  0706 02/16/23  0659 02/15/23  1049   WBC 11.2* 12.1* 11.1*   HGB 10.3* 10.9* 11.5*    327 339     Sed Rate   Date Value Ref Range Status   12/19/2020 10 0 - 20 mm/h Final         Culture results: Pending    Assessment: Andrew Collier is a 57 year old male who was admitted on 2/15/2023.  He has a medical history of IV methamphetamine abuse, anxiety, depression and HIV admitted to ED observation for antibiotic treatment or L wrist cellulitis at an IV injection site.  Transferring to inpatient internal medicine for ongoing fevers, pain and elevation in inflammatory markers.    The antibiotic regimen the patient is currently on appears to be contributing to improvement of the patients symptoms. He has increased ROM and decreased pain this morning. We will continue to monitor in the event he does not improve completely,  but at this juncture he does not need operative management.     Patient discussed with Dr. Olivares & Dr. Glover, orthopedic surgery senior resident and with prthopedic surgery attending is Dr. Webb.     Wilfredo Palacios, DO  Orthopedic Surgery PGY1  Pager: [phone]     Please page me directly with any questions/concerns during regular weekday hours before 5 pm. If there is no response, if it is a weekend, or if it is during evening hours then please page the orthopedic surgery resident on call.

## 2023-02-18 NOTE — PLAN OF CARE
"Goal Outcome Evaluation:/74 (BP Location: Right arm)   Pulse 90   Temp 98.8  F (37.1  C) (Oral)   Resp 16   Ht 1.6 m (5' 3\")   Wt 67.9 kg (149 lb 11.1 oz)   SpO2 99%   BMI 26.52 kg/m                   Shift: 3758-1138  Overall patient progress:  Increased swelling and redness in  L wrist  Neuro: A&Ox4.   Cardiac: SR. VSS.     Respiratory: Sating >99% on RA, denies SOB.  GI/: Adequate urine output. Last BM 2/17.  Diet/appetite: currently NPO  Activity:  Independent in the room.  Pain: C/O left hand/wrist pain 9/10;scheduled Toradol administered;effective.   Skin: No new deficits noted.  LDA's: R PIV infusing NS at 125 ml/ hr     Plan: Continue with POC. Notify primary team with changes.    Per MRI technician;Pt is unable to complete MRI due to limited information on the penile implant. Staff communicated this information to the team.                "

## 2023-02-19 LAB
ANION GAP SERPL CALCULATED.3IONS-SCNC: 10 MMOL/L (ref 7–15)
BUN SERPL-MCNC: 9.5 MG/DL (ref 8–23)
CALCIUM SERPL-MCNC: 8.3 MG/DL (ref 8.6–10)
CHLORIDE SERPL-SCNC: 103 MMOL/L (ref 98–107)
CREAT SERPL-MCNC: 0.76 MG/DL (ref 0.67–1.17)
CRP SERPL-MCNC: 71.1 MG/L
DEPRECATED HCO3 PLAS-SCNC: 22 MMOL/L (ref 22–29)
ERYTHROCYTE [DISTWIDTH] IN BLOOD BY AUTOMATED COUNT: 12.1 % (ref 10–15)
GFR SERPL CREATININE-BSD FRML MDRD: >90 ML/MIN/1.73M2
GLUCOSE BLDC GLUCOMTR-MCNC: 231 MG/DL (ref 70–99)
GLUCOSE BLDC GLUCOMTR-MCNC: 232 MG/DL (ref 70–99)
GLUCOSE BLDC GLUCOMTR-MCNC: 290 MG/DL (ref 70–99)
GLUCOSE BLDC GLUCOMTR-MCNC: 326 MG/DL (ref 70–99)
GLUCOSE BLDC GLUCOMTR-MCNC: 436 MG/DL (ref 70–99)
GLUCOSE SERPL-MCNC: 240 MG/DL (ref 70–99)
HCT VFR BLD AUTO: 28 % (ref 40–53)
HGB BLD-MCNC: 9.3 G/DL (ref 13.3–17.7)
MAGNESIUM SERPL-MCNC: 1.9 MG/DL (ref 1.7–2.3)
MCH RBC QN AUTO: 28.4 PG (ref 26.5–33)
MCHC RBC AUTO-ENTMCNC: 33.2 G/DL (ref 31.5–36.5)
MCV RBC AUTO: 86 FL (ref 78–100)
PLATELET # BLD AUTO: 378 10E3/UL (ref 150–450)
POTASSIUM SERPL-SCNC: 3.7 MMOL/L (ref 3.4–5.3)
RBC # BLD AUTO: 3.27 10E6/UL (ref 4.4–5.9)
SODIUM SERPL-SCNC: 135 MMOL/L (ref 136–145)
WBC # BLD AUTO: 7.9 10E3/UL (ref 4–11)

## 2023-02-19 PROCEDURE — 99232 SBSQ HOSP IP/OBS MODERATE 35: CPT | Performed by: INTERNAL MEDICINE

## 2023-02-19 PROCEDURE — 120N000002 HC R&B MED SURG/OB UMMC

## 2023-02-19 PROCEDURE — 250N000013 HC RX MED GY IP 250 OP 250 PS 637: Performed by: INTERNAL MEDICINE

## 2023-02-19 PROCEDURE — 258N000003 HC RX IP 258 OP 636: Performed by: INTERNAL MEDICINE

## 2023-02-19 PROCEDURE — 250N000013 HC RX MED GY IP 250 OP 250 PS 637: Performed by: NURSE PRACTITIONER

## 2023-02-19 PROCEDURE — 86140 C-REACTIVE PROTEIN: CPT | Performed by: NURSE PRACTITIONER

## 2023-02-19 PROCEDURE — 83735 ASSAY OF MAGNESIUM: CPT | Performed by: INTERNAL MEDICINE

## 2023-02-19 PROCEDURE — 250N000011 HC RX IP 250 OP 636: Performed by: NURSE PRACTITIONER

## 2023-02-19 PROCEDURE — 258N000003 HC RX IP 258 OP 636: Performed by: NURSE PRACTITIONER

## 2023-02-19 PROCEDURE — 99221 1ST HOSP IP/OBS SF/LOW 40: CPT | Mod: GC | Performed by: INTERNAL MEDICINE

## 2023-02-19 PROCEDURE — 999N000248 HC STATISTIC IV INSERT WITH US BY RN

## 2023-02-19 PROCEDURE — 250N000011 HC RX IP 250 OP 636: Performed by: INTERNAL MEDICINE

## 2023-02-19 PROCEDURE — 85014 HEMATOCRIT: CPT | Performed by: NURSE PRACTITIONER

## 2023-02-19 PROCEDURE — 36415 COLL VENOUS BLD VENIPUNCTURE: CPT | Performed by: NURSE PRACTITIONER

## 2023-02-19 PROCEDURE — 82310 ASSAY OF CALCIUM: CPT | Performed by: NURSE PRACTITIONER

## 2023-02-19 RX ORDER — ACETAMINOPHEN 325 MG/1
650 TABLET ORAL 3 TIMES DAILY
Status: COMPLETED | OUTPATIENT
Start: 2023-02-19 | End: 2023-02-20

## 2023-02-19 RX ORDER — GLIMEPIRIDE 4 MG/1
4 TABLET ORAL
Status: DISCONTINUED | OUTPATIENT
Start: 2023-02-19 | End: 2023-02-20 | Stop reason: HOSPADM

## 2023-02-19 RX ORDER — IBUPROFEN 600 MG/1
600 TABLET, FILM COATED ORAL 3 TIMES DAILY
Status: COMPLETED | OUTPATIENT
Start: 2023-02-19 | End: 2023-02-20

## 2023-02-19 RX ORDER — METFORMIN HCL 500 MG
2000 TABLET, EXTENDED RELEASE 24 HR ORAL
Status: DISCONTINUED | OUTPATIENT
Start: 2023-02-19 | End: 2023-02-20 | Stop reason: HOSPADM

## 2023-02-19 RX ORDER — SULFAMETHOXAZOLE/TRIMETHOPRIM 800-160 MG
1 TABLET ORAL 2 TIMES DAILY
Status: DISCONTINUED | OUTPATIENT
Start: 2023-02-19 | End: 2023-02-20 | Stop reason: HOSPADM

## 2023-02-19 RX ORDER — LEVOFLOXACIN 500 MG/1
500 TABLET, FILM COATED ORAL DAILY
Status: DISCONTINUED | OUTPATIENT
Start: 2023-02-19 | End: 2023-02-20 | Stop reason: HOSPADM

## 2023-02-19 RX ORDER — METFORMIN HCL 500 MG
2000 TABLET, EXTENDED RELEASE 24 HR ORAL
Status: DISCONTINUED | OUTPATIENT
Start: 2023-02-20 | End: 2023-02-19

## 2023-02-19 RX ORDER — KETOROLAC TROMETHAMINE 30 MG/ML
30 INJECTION, SOLUTION INTRAMUSCULAR; INTRAVENOUS EVERY 6 HOURS PRN
Status: CANCELLED | OUTPATIENT
Start: 2023-02-19 | End: 2023-02-21

## 2023-02-19 RX ADMIN — LEVOFLOXACIN 500 MG: 500 TABLET, FILM COATED ORAL at 15:46

## 2023-02-19 RX ADMIN — ACETAMINOPHEN 650 MG: 325 TABLET ORAL at 22:02

## 2023-02-19 RX ADMIN — BUPROPION HYDROCHLORIDE 150 MG: 150 TABLET, FILM COATED, EXTENDED RELEASE ORAL at 10:43

## 2023-02-19 RX ADMIN — SENNOSIDES AND DOCUSATE SODIUM 2 TABLET: 50; 8.6 TABLET ORAL at 10:43

## 2023-02-19 RX ADMIN — KETOROLAC TROMETHAMINE 30 MG: 30 INJECTION, SOLUTION INTRAMUSCULAR; INTRAVENOUS at 08:30

## 2023-02-19 RX ADMIN — ENOXAPARIN SODIUM 40 MG: 40 INJECTION SUBCUTANEOUS at 16:41

## 2023-02-19 RX ADMIN — IBUPROFEN 600 MG: 600 TABLET, FILM COATED ORAL at 20:33

## 2023-02-19 RX ADMIN — INSULIN ASPART 4 UNITS: 100 INJECTION, SOLUTION INTRAVENOUS; SUBCUTANEOUS at 10:12

## 2023-02-19 RX ADMIN — PIPERACILLIN AND TAZOBACTAM 3.38 G: 3; .375 INJECTION, POWDER, LYOPHILIZED, FOR SOLUTION INTRAVENOUS at 05:48

## 2023-02-19 RX ADMIN — METFORMIN ER 500 MG 2000 MG: 500 TABLET ORAL at 19:30

## 2023-02-19 RX ADMIN — FAMOTIDINE 40 MG: 20 TABLET ORAL at 10:44

## 2023-02-19 RX ADMIN — INSULIN ASPART 10 UNITS: 100 INJECTION, SOLUTION INTRAVENOUS; SUBCUTANEOUS at 19:34

## 2023-02-19 RX ADMIN — SODIUM CHLORIDE: 9 INJECTION, SOLUTION INTRAVENOUS at 02:59

## 2023-02-19 RX ADMIN — INSULIN ASPART 4 UNITS: 100 INJECTION, SOLUTION INTRAVENOUS; SUBCUTANEOUS at 12:47

## 2023-02-19 RX ADMIN — GLIMEPIRIDE 4 MG: 4 TABLET ORAL at 19:30

## 2023-02-19 RX ADMIN — VANCOMYCIN HYDROCHLORIDE 1250 MG: 10 INJECTION, POWDER, LYOPHILIZED, FOR SOLUTION INTRAVENOUS at 02:59

## 2023-02-19 RX ADMIN — SULFAMETHOXAZOLE AND TRIMETHOPRIM 1 TABLET: 800; 160 TABLET ORAL at 20:33

## 2023-02-19 RX ADMIN — PIPERACILLIN AND TAZOBACTAM 3.38 G: 3; .375 INJECTION, POWDER, LYOPHILIZED, FOR SOLUTION INTRAVENOUS at 12:13

## 2023-02-19 RX ADMIN — ACETAMINOPHEN 650 MG: 325 TABLET ORAL at 18:29

## 2023-02-19 RX ADMIN — IBUPROFEN 600 MG: 600 TABLET, FILM COATED ORAL at 14:28

## 2023-02-19 RX ADMIN — BICTEGRAVIR SODIUM, EMTRICITABINE, AND TENOFOVIR ALAFENAMIDE FUMARATE 1 TABLET: 50; 200; 25 TABLET ORAL at 10:43

## 2023-02-19 RX ADMIN — GLIPIZIDE 5 MG: 5 TABLET ORAL at 10:44

## 2023-02-19 ASSESSMENT — ACTIVITIES OF DAILY LIVING (ADL)
ADLS_ACUITY_SCORE: 20
ADLS_ACUITY_SCORE: 26
ADLS_ACUITY_SCORE: 20
ADLS_ACUITY_SCORE: 26
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
## 2023-02-19 NOTE — PROGRESS NOTES
"Pt c/o pain 8/10. Staff offered oxycodone ,but pt said\" I don't take oxycodone. Pt was notified that Toradol was due at 10:15PM and he was willing to wait. After less than 10 min,pt called and said he wanted something pain as he was really hurting . Staff brought in IV Dilaudid ,but pt got really upset and said he didn't want Dilaudid and asked staff to leave his room. Pt grabbed his IV pole and came outside looking  for the charge nurse and  he was notified by fellow nurse that his primary  was the CN. Pt said he wanted another nurse and staff requested  RN circulator in the ED to give pt his 2200 medication and IV Toradol as it was due at this time. Pt received all medication.   "

## 2023-02-19 NOTE — PROGRESS NOTES
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  I have verified the history and personally performed the physical exam and medical decision making.  I agree with the assessment and plan of care as documented in the note.      Key findings:     Doing better when seen late this morning. Reported improvement in swelling of the left hand as well as movement of the fingers and range of motion of the left wrist. No fevers/chills, change in appetite, nausea or vomiting. States he is doing well with hydration. Notes that the Toradol helps his pain the most, not interested in any narcotics for pain control. On exam, his left hand looks markedly improved, with some persistent swelling of the dorsum of the left hand but improved ROM of the fingers as well as ROM at the wrist (flexion/extension). Labs continue to show improvement in inflammatory markers.    Overall, agree with plans below. Pt making substantial improvements on current regimen. Given relative immunocompromised status and unclear microbiological etiology for infection, will transition to broad coverage with Bactrim and Levaquin. Will transition to PO pain control, PO hydration. Appreciate Endocrinology input on diabetes management given multiple barriers to glycemic control (unhoused status, no access to fridge for insulin, possibility of food insecurity). Hopeful for discharge tomorrow 2/20.    Please see A&P for additional details of medical decision making.    I have personally reviewed the following data over the past 24 hrs:    7.9  \   9.3 (L)   / 378     135 (L) 103 9.5 /  232 (H)   3.7 22 0.76 \       Procal: N/A CRP: 71.10 (H) Lactic Acid: N/A           Esther Mauro MD  Date of Service (when I saw the patient): 02/19/23    [facility]    Progress Note - Hospitalist Service, GOLD TEAM  9       Date of Admission:  2/15/2023    Assessment & Plan   Andrew Collier is a 57 year old male who was admitted on 2/15/2023.  He has a medical history of IV methamphetamine abuse, anxiety, depression and HIV admitted to ED observation for antibiotic treatment or L wrist cellulitis at an IV injection site.  Transferring to inpatient internal medicine for ongoing fevers, pain and elevation in inflammatory markers.     Changes today:   - With clinical improvement, will transition to PO agents:  Levofloxacin 500mg daily, Bactrim 800-160mg BID  - Cliff tylenol 650mg TID for 24 hr  - Cliff ibuprofen 600mg TID for 24 hr  - Hold Toradol with cliff ibuprofen  - Follow up MRI hand/forearm read  - Discontinue mIVF  - Endocrinology consult to help with plan for outpatient diabetes management  - Anticipate discharge tomorrow     #Sepsis due to L wrist cellulitis   #Infectious tenosynotivits  Began after attempting to inject meth into dorsal hand vein - pt states he noticed swelling of his dorsal hand and significant pain afterwards. Initially admitted to ED Obs with Orthopedics consult - initial CT AP showed dorsal subcutaneous edema without abscess and infectious tenosynovitis. Pain increased with uptrending inflammatory markers CRP 91->197, higher fevers 101 and ongoing pain in ED/obs, leading to admission to medicine. Pain increased on 2/17 leading to repeat CT scan showing progression of tenosynovitis but still no abscess or subcutaneous emphysema. Clinically improving with decreased swelling and increased ROM, but continues have pain.   - Ortho consulting, appreciate recs              - Given clinical improvement, electing to monitor with close surveillance for now              - MRI left hand/wrist 2/18, read pending  - Antimicrobial therapy:              - s/p Vancomycin (2/15-2/19), Zosyn (2/16-2/19), CTX (2/16)              - Transition to PO agents    - Levofloxacin 500mg daily (2/19-)    - Bactrim 800-160mg BID  "(2/19-)  - Pain control:              - Cliff tylenol 650mg TID for 24 hr   - Cliff ibuprofen 600mg TID for 24 hr   - Hold Toradol 30mg Q6hr PRN with cliff ibuprofen              - PRN oxycodone 5-10mg Q4hr PRN as 1st line              - PRN dilaudid 0.5mg Q4hr as 2nd line  - Elevation as able  - BCx 2/16 NGTD  - Daily CBC and CRP     #Poorly controlled type II DM   Hgb A1C 12.8 on Metformin only PTA. Per chart review, pt refuses to take insulin at home as he feels it causes SBOs. Per pt, has cost issue affording insulin and managing needles with unstable housing situation.   - Endocrinology consult to help with plan for outpatient diabetes management, appreciate recs  - Resume PTA metformin   - High intensity sliding scale insulin while inpatient  - Glipizide 5mg QAM, added to assist with transition to outpatient management  - S/p lantus, discontinued in preparation for discharge in coming days  - Hypoglycemia protocol   - Glucoses qAC and at bedtime     #HIV infection  Diagnosed 2015, has been undetectable for last few years with relatively good adherence.  CD4 has been >200.   - Continue PTA Biktarvy     #GERD   - Continue PTA Pepcid   - Regular diet     #Hyponatremia, improving  Likely due to sepsis and poor PO intake   - Discontinue mIVF  - Encourage PO intake  - BMP in AM      #Hypokalemia  #Hypomagnesmia   - RN managed electrolyte replacement     #Anemia  Likely due to chronic illness and infection.  Hgb stable in 10s. No s/s of bleeding  - CBC in AM      #DAVID  #MDD  #Methamphetamine abuse, in acute withdrawal   Pt endorses long history of meth use though endorses some prolonged periods of sobriety. Mostly smoking it, but tried injecting it before coming into the hospital and understands this was a \"terrible\" mistake. States he takes Meth to help with energy levels. Discussed options for treatment including rehab, or trial of medication to help with energy (I.e. wellbutrin). He is open to trying this.  - Wellbutrin " 150 mg XR daily (started 2/18)  - Continue PRN hydroxyzine, melatonin, olanzapine ODT  - Not interested in treatment at this time, has OP follow-up in ID clinic          Diet: Regular Diet Adult    DVT Prophylaxis: Enoxaparin (Lovenox) SQ  Potts Catheter: Not present  Fluids: None  Lines: None     Cardiac Monitoring: None  Code Status: Full Code      Disposition Plan      Expected Discharge Date: 02/20/2023      Destination: shelter  Discharge Comments: pending labs, vitals in a.m.        The patient's care was discussed with the Attending Physician, Dr. Esther Mauro.    [facility]  Medical Student  Hospitalist Service, [address]  Securely message with [employer] (more info)  Text page via [email or web address] Paging/Directory   See signed in provider for up to date coverage information  ______________________________________________________________________    Interval History   No acute events overnight. Nursing nights reviewed. Declined opioids.     This morning, Andrew is doing ok. Still has pain in left hand. Did not get enough sleep last night due to pain. Feels he has been eating poorly. Did not have breakfast, but will order lunch. Denies shortness of breath. Continuing to make appropriate urine and have bowel movements. He doesn't want opioids but would be ok trying tylenol.     Physical Exam   Vital Signs: Temp: (P) 98.6  F (37  C) Temp src: (P) Oral BP: (P) 131/82 Pulse: (P) 74   Resp: (P) 16 SpO2: (P) 100 % O2 Device: (P) None (Room air)    Weight: 149 lbs 11.08 oz    Constitutional: Awake, alert, and oriented. Initially very tired, but more awake and cooperative later in the morning.   Respiratory: Breathing comfortably on room air. No increased work of breathing. Lungs clear to ascultation.   Cardiovascular: RRR, well perfused.   GI: Bowel sounds present. Abdomen soft, non-tender, non-distended.   Musculoskeletal: Swelling and warmth in left hand decreased  compared to previous days. No erythema.  to palpation over proximal MCPs and thumb, but pain improved compared to previous days. Increased ability to make fist and increased ROM in flexion and extension at the wrist compared to previous days.   Neurologic: Alert and oriented. Cranial nerves grossly intact.     Data     I have personally reviewed the following data over the past 24 hrs:    7.9  \   9.3 (L)   / 378     135 (L) 103 9.5 /  232 (H)   3.7 22 0.76 \       Procal: N/A CRP: 71.10 (H) Lactic Acid: N/A         Imaging results reviewed over the past 24 hrs:   No results found for this or any previous visit (from the past 24 hour(s)).

## 2023-02-19 NOTE — PROGRESS NOTES
Orthopaedic Surgery Progress Note 02/19/2023    S:   MARCEL. BJS. Endorses increased pain this morning, he is getting his morning dose of pain meds soon. Reports that he is feeling better overall but still in pain. MRI completed.     WBC down trending, 7.9 this am (8.5<11.2 <12.1). CRP continues to be elevated but decreasing, 71 this am (<126< 170<197).     O:  Temp: (P) 98.6  F (37  C) Temp src: (P) Oral BP: (P) 131/82 Pulse: (P) 74   Resp: (P) 16 SpO2: (P) 100 % O2 Device: (P) None (Room air)      Exam:  Gen: No acute distress, resting comfortably in bed.  Resp: Non-labored breathing  MSK:  LUE: exam consistent with previous, but improved   - Maximally tender over 2nd-3rd metacarpal. Improved from yesterdays exam. ROM improved actively compared to yesterday.   - Non-tender over the palmar aspect of the hand   - SILT medial/radial/ulnar/axillary nerves    Recent Labs   Lab 02/18/23  0750 02/17/23  0706 02/16/23  0659   WBC 8.5 11.2* 12.1*   HGB 10.1* 10.3* 10.9*    325 327     Sed Rate   Date Value Ref Range Status   12/19/2020 10 0 - 20 mm/h Final     Imaging: Reviewed MRI from 2/18/23 of left hand and wrist that demonstrates no obvious fluid collection, no joint effusion, no involvement of flexor tendon. Tissue edema present. Pending final reads at this time.     Culture results: Pending, NGTD    Assessment: Andrew Collier is a 57 year old male who was admitted on 2/15/2023.  He has a medical history of IV methamphetamine abuse, anxiety, depression and HIV admitted to ED observation for antibiotic treatment or L wrist cellulitis at an IV injection site.  Transferring to inpatient internal medicine for ongoing fevers, pain and elevation in inflammatory markers.    The antibiotic regimen appears to be improving the patients symptoms. MRI not concerning for abscess at this time. WBC and CRP down trending. He continues to have increased ROM and decreased area of pain on the dorsal hand. At this time,  he is not in need of operative management.     Orthopaedics will follow this patient peripherally at this time. Please call or page me or the on-call resident with any concerns or questions.    Patient discussed with Dr. Olivares & Dr. Glover, orthopedic surgery senior residents and with orthopedic surgery attending is Dr. Webb.     Wilfredo Palacios, DO  Orthopedic Surgery PGY1  Pager: [phone]     Please page me directly with any questions/concerns during regular weekday hours before 5 pm. If there is no response, if it is a weekend, or if it is during evening hours then please page the orthopedic surgery resident on call.

## 2023-02-19 NOTE — PLAN OF CARE
"Goal Outcome Evaluation:      Plan of Care Reviewed With: patient    Overall Patient Progress: improving    Shift: 0938-4547    Neuro: A&O x4; able to make needs known; pt has been cooperative and mostly compliant this shift   Cardiac: VSS; denies any chest pain   Respiratory: On RA; cough noted   GI/: Voiding well; tolerating diet; BM today 2/18 per pt   Diet: Regular   Pain: Pt reports pain in the L wrist area; Toradol given x1 & pt declines any other interventions; refusing to elevate RUE  Activity:Independent   LDA: PIV with IVMF at 125 mL/hr; IV   VS: /69 (BP Location: Right arm, Patient Position: Supine, Cuff Size: Adult Regular)   Pulse 95   Temp 97.9  F (36.6  C) (Oral)   Resp 18   Ht 1.6 m (5' 3\")   Wt 67.9 kg (149 lb 11.1 oz)   SpO2 100%   BMI 26.52 kg/m     Other: Pts 2hr post lunch BG was 277 at 1705   Pts dinner BG was 297--treated with novolog 7 units (pt declining to eat dinner--care team would like BG to be treated anyway)    Post meal BG due at 2035      "

## 2023-02-19 NOTE — PLAN OF CARE
"Goal Outcome Evaluation:  Shift: 5916-7789  Vitals: BP (P) 131/82 (BP Location: Right arm)   Pulse (P) 74   Temp (P) 98.6  F (37  C) (Oral)   Resp (P) 16   Ht 1.6 m (5' 3\")   Wt 67.9 kg (149 lb 11.1 oz)   SpO2 (P) 100%   BMI 26.52 kg/m      Pt refused VS assessment at 1200     Neuros: A/O X4.   IV: right TKO  Labs/Electrolytes: K 3.7 and Mag 1.7, no replacement required. BS checked with meals, pt refuses BS 2 hrs after meals.   Resp/trach: RA, denies sob,   Diet: Reg   Bowel status: No bm this shift.   : voiding spontaneously   Skin: left wrist swollen.    Pain: managed with Toradol in the morning and Ibuprofen   Activity: impendent in the room        Plan of Care Reviewed With: patient    Overall Patient Progress: no changeOverall Patient Progress: no change           "

## 2023-02-19 NOTE — PLAN OF CARE
"Goal Outcome Evaluation:Blood pressure 116/59, pulse 96, temperature 98.4  F (36.9  C), temperature source Oral, resp. rate 18, height 1.6 m (5' 3\"), weight 67.9 kg (149 lb 11.1 oz), SpO2 100 %.       Plan of Care Reviewed With: patient     Overall Patient Progress: improving     Shift: 1110-5858     Neuro: A&O x4; liable mood   Cardiac: VSS; denies any chest pain   Respiratory: On RA; infrequent nonproductive cough noted. Denies SOB.   GI/: Voiding well; tolerating diet; BM today 2/18 per pt   Diet: Regular   Pain: Pt reported L wrist pain;IV Toradol administered.  Activity:Independent   LDA: PIV with IVMF at 125 mL/hr; IV Zosyn administered.     Plan: Continue with the current plan of care.           "

## 2023-02-20 VITALS
HEIGHT: 63 IN | SYSTOLIC BLOOD PRESSURE: 133 MMHG | DIASTOLIC BLOOD PRESSURE: 82 MMHG | OXYGEN SATURATION: 99 % | BODY MASS INDEX: 26.52 KG/M2 | RESPIRATION RATE: 18 BRPM | TEMPERATURE: 98 F | WEIGHT: 149.69 LBS | HEART RATE: 75 BPM

## 2023-02-20 LAB
ANION GAP SERPL CALCULATED.3IONS-SCNC: 11 MMOL/L (ref 7–15)
BUN SERPL-MCNC: 12.1 MG/DL (ref 8–23)
CALCIUM SERPL-MCNC: 8.9 MG/DL (ref 8.6–10)
CHLORIDE SERPL-SCNC: 104 MMOL/L (ref 98–107)
CREAT SERPL-MCNC: 0.74 MG/DL (ref 0.67–1.17)
CRP SERPL-MCNC: 42.5 MG/L
DEPRECATED HCO3 PLAS-SCNC: 22 MMOL/L (ref 22–29)
ERYTHROCYTE [DISTWIDTH] IN BLOOD BY AUTOMATED COUNT: 12.2 % (ref 10–15)
GFR SERPL CREATININE-BSD FRML MDRD: >90 ML/MIN/1.73M2
GLUCOSE BLDC GLUCOMTR-MCNC: 139 MG/DL (ref 70–99)
GLUCOSE BLDC GLUCOMTR-MCNC: 198 MG/DL (ref 70–99)
GLUCOSE BLDC GLUCOMTR-MCNC: 274 MG/DL (ref 70–99)
GLUCOSE SERPL-MCNC: 172 MG/DL (ref 70–99)
HCT VFR BLD AUTO: 30.8 % (ref 40–53)
HGB BLD-MCNC: 10.1 G/DL (ref 13.3–17.7)
MAGNESIUM SERPL-MCNC: 2 MG/DL (ref 1.7–2.3)
MCH RBC QN AUTO: 28.6 PG (ref 26.5–33)
MCHC RBC AUTO-ENTMCNC: 32.8 G/DL (ref 31.5–36.5)
MCV RBC AUTO: 87 FL (ref 78–100)
PLATELET # BLD AUTO: 412 10E3/UL (ref 150–450)
POTASSIUM SERPL-SCNC: 3.6 MMOL/L (ref 3.4–5.3)
RBC # BLD AUTO: 3.53 10E6/UL (ref 4.4–5.9)
SODIUM SERPL-SCNC: 137 MMOL/L (ref 136–145)
WBC # BLD AUTO: 7.4 10E3/UL (ref 4–11)

## 2023-02-20 PROCEDURE — 250N000013 HC RX MED GY IP 250 OP 250 PS 637: Performed by: NURSE PRACTITIONER

## 2023-02-20 PROCEDURE — 99232 SBSQ HOSP IP/OBS MODERATE 35: CPT | Performed by: INTERNAL MEDICINE

## 2023-02-20 PROCEDURE — 99239 HOSP IP/OBS DSCHRG MGMT >30: CPT | Performed by: INTERNAL MEDICINE

## 2023-02-20 PROCEDURE — 86140 C-REACTIVE PROTEIN: CPT | Performed by: NURSE PRACTITIONER

## 2023-02-20 PROCEDURE — 250N000011 HC RX IP 250 OP 636: Performed by: INTERNAL MEDICINE

## 2023-02-20 PROCEDURE — 80048 BASIC METABOLIC PNL TOTAL CA: CPT | Performed by: NURSE PRACTITIONER

## 2023-02-20 PROCEDURE — 250N000013 HC RX MED GY IP 250 OP 250 PS 637: Performed by: INTERNAL MEDICINE

## 2023-02-20 PROCEDURE — 85027 COMPLETE CBC AUTOMATED: CPT | Performed by: NURSE PRACTITIONER

## 2023-02-20 PROCEDURE — 83735 ASSAY OF MAGNESIUM: CPT | Performed by: INTERNAL MEDICINE

## 2023-02-20 PROCEDURE — 36415 COLL VENOUS BLD VENIPUNCTURE: CPT | Performed by: NURSE PRACTITIONER

## 2023-02-20 RX ORDER — IBUPROFEN 600 MG/1
600 TABLET, FILM COATED ORAL EVERY 6 HOURS PRN
Qty: 90 TABLET | Refills: 0 | Status: SHIPPED | OUTPATIENT
Start: 2023-02-20 | End: 2023-11-09

## 2023-02-20 RX ORDER — GLIMEPIRIDE 4 MG/1
4 TABLET ORAL
Qty: 90 TABLET | Refills: 1 | Status: ON HOLD | OUTPATIENT
Start: 2023-02-20 | End: 2023-08-16

## 2023-02-20 RX ORDER — BUPROPION HYDROCHLORIDE 150 MG/1
150 TABLET ORAL DAILY
Qty: 60 TABLET | Refills: 0 | Status: ON HOLD | OUTPATIENT
Start: 2023-02-21 | End: 2023-08-16

## 2023-02-20 RX ORDER — METFORMIN HYDROCHLORIDE EXTENDED-RELEASE TABLETS 1000 MG/1
2000 TABLET, FILM COATED, EXTENDED RELEASE ORAL
Qty: 120 TABLET | Refills: 0 | Status: ON HOLD | OUTPATIENT
Start: 2023-02-20 | End: 2023-08-16

## 2023-02-20 RX ORDER — ACETAMINOPHEN 500 MG
500-1000 TABLET ORAL EVERY 6 HOURS PRN
Qty: 90 TABLET | Refills: 0 | Status: SHIPPED | OUTPATIENT
Start: 2023-02-20 | End: 2023-06-20

## 2023-02-20 RX ORDER — FAMOTIDINE 40 MG/1
40 TABLET, FILM COATED ORAL DAILY
Qty: 60 TABLET | Refills: 1 | Status: ON HOLD | OUTPATIENT
Start: 2023-02-20 | End: 2023-08-16

## 2023-02-20 RX ORDER — SULFAMETHOXAZOLE/TRIMETHOPRIM 800-160 MG
1 TABLET ORAL 2 TIMES DAILY
Qty: 7 TABLET | Refills: 0 | Status: SHIPPED | OUTPATIENT
Start: 2023-02-20 | End: 2023-08-07

## 2023-02-20 RX ORDER — LEVOFLOXACIN 500 MG/1
500 TABLET, FILM COATED ORAL DAILY
Qty: 3 TABLET | Refills: 0 | Status: ON HOLD | OUTPATIENT
Start: 2023-02-21 | End: 2023-08-16

## 2023-02-20 RX ADMIN — FAMOTIDINE 40 MG: 20 TABLET ORAL at 09:23

## 2023-02-20 RX ADMIN — IBUPROFEN 600 MG: 600 TABLET, FILM COATED ORAL at 09:22

## 2023-02-20 RX ADMIN — ACETAMINOPHEN 650 MG: 325 TABLET ORAL at 09:21

## 2023-02-20 RX ADMIN — INSULIN ASPART 3 UNITS: 100 INJECTION, SOLUTION INTRAVENOUS; SUBCUTANEOUS at 14:41

## 2023-02-20 RX ADMIN — GLIMEPIRIDE 4 MG: 4 TABLET ORAL at 09:24

## 2023-02-20 RX ADMIN — SITAGLIPTIN 100 MG: 100 TABLET, FILM COATED ORAL at 14:42

## 2023-02-20 RX ADMIN — BICTEGRAVIR SODIUM, EMTRICITABINE, AND TENOFOVIR ALAFENAMIDE FUMARATE 1 TABLET: 50; 200; 25 TABLET ORAL at 09:23

## 2023-02-20 RX ADMIN — LEVOFLOXACIN 500 MG: 500 TABLET, FILM COATED ORAL at 09:23

## 2023-02-20 RX ADMIN — SULFAMETHOXAZOLE AND TRIMETHOPRIM 1 TABLET: 800; 160 TABLET ORAL at 09:22

## 2023-02-20 RX ADMIN — HYDROXYZINE HYDROCHLORIDE 50 MG: 50 TABLET ORAL at 01:58

## 2023-02-20 RX ADMIN — ENOXAPARIN SODIUM 40 MG: 40 INJECTION SUBCUTANEOUS at 14:42

## 2023-02-20 ASSESSMENT — ACTIVITIES OF DAILY LIVING (ADL)
ADLS_ACUITY_SCORE: 20

## 2023-02-20 NOTE — CONSULTS
Care Management Initial Consult    General Information  Assessment completed with: VM-chart review,    Type of CM/SW Visit: Initial Assessment    Primary Care Provider verified and updated as needed: Yes (Chevy Reynoso [phone] [address] [place] [id number])   Readmission within the last 30 days:  (3 ED visits in the last 30 days: (1/24/23-generaliized abdominal pain; 2/1/23-epigastric abdominal pain; 2/6/23/ partial intenstinal obstruction-unspecified cause))      Reason for Consult: utilization management concerns, discharge planning (Elevated Readmission Risk score)  Advance Care Planning: Advance Care Planning Reviewed: other (see comments) (not able to discuss)   (did not discuss at this time)     Communication Assessment  Patient's communication style: spoken language (English or Bilingual)    Hearing Difficulty or Deaf: no   Wear Glasses or Blind: no    Cognitive  Cognitive/Neuro/Behavioral: WDL  Level of Consciousness: alert  Arousal Level: opens eyes spontaneously  Orientation: oriented x 4  Mood/Behavior: calm, cooperative, behavior appropriate to situation  Best Language: 0 - No aphasia  Speech: clear, spontaneous, logical    Living Environment:   People in home: alone     Current living Arrangements: shelter, homeless      Able to return to prior arrangements: yes  Living Arrangement Comments:  ([facility])    Family/Social Support:  Care provided by: self  Provides care for: no one, unable/limited ability to care for self  Marital Status: Single   (Limited to [facility] services)          Description of Support System: Other (see comments) (Limited to [facility] services)    Support Assessment: Adequate social supports, Limited social contact and support, Uses health system/providers for social contact    Current Resources:   Patient receiving home care services: No     Community Resources: [facility] Programs, [facility] Worker, HIV Counseling, Financial/Insurance, Other (see comment)  (Homeless shelter)  Equipment currently used at home: none  Supplies currently used at home: None    Employment/Financial:  Employment Status: unemployed        Financial Concerns: No concerns identified   Referral to Financial Worker: No       Lifestyle & Psychosocial Needs:  Social Determinants of Health     Tobacco Use: High Risk     Smoking Tobacco Use: Some Days     Smokeless Tobacco Use: Former     Passive Exposure: Not on file   Alcohol Use: Not on file   Financial Resource Strain: Not on file   Food Insecurity: Not on file   Transportation Needs: Not on file   Physical Activity: Not on file   Stress: Not on file   Social Connections: Not on file   Intimate Partner Violence: Not on file   Depression: Not at risk     PHQ-2 Score: 0   Housing Stability: Not on file       Functional Status:  Prior to admission patient needed assistance:   Dependent ADLs:: Independent  Dependent IADLs:: Independent     Mental Health Status:  Mental Health Status: No Current Concerns (Hx of MDD)       Chemical Dependency Status:  Chemical Dependency Status: No Current Concerns (denies)           Values/Beliefs:  Spiritual, Cultural Beliefs, Restorationism Practices, Values that affect care:           Additional Information:    Andrew Collier is a 57 year old male who has a history significant for IV methamphetamine  Abuse, anxiety, depression, HIV admitted to OBS for antibiotic treatment for cellulitis of left wrist from at IV injection site.     Care Management/Social Work Consult placed due to patient's complex medical history, elevated unplanned readmission risk score and discharge planning. DAYNA performed chart review to begin assessment.    DAYNA and DAYNA colleagues have made multiple attempts to meet with pt to complete assessment without success.    Per chart review, pt verified [facility] at [facility] which [facility] home shelter for homeless individuals. Contacts are ([phone] - Marcus; or [phone]). DAYNA was unable  to leave VM on 2/17 due to both mailboxes being full. DAYNA LVM at [facility] ([phone]) on 2/17 with no response.    Per chart review, pt's discharge orders are signed.    1438 DAYNA spoke with OBS Charge RN Brooke who asked SW to arrange discharge transportation for pt. DAYNA then verified pt's address for transport. Pt did not have his cell phone with him.     1445 DAYNA told Bedside RN that SW would make arrangements after pt ate his meal, had all his discharge medications, paperwork signed and ready to walk out the door.    1447 DAYNA contacted [facility] [phone], spoke with Janie, and was unable to secure discharge transportation as pt is not set up in their system    4580 SW contacted [employer] [facility] [employer] ([phone]) and was able to secure discharge transportation for ASAP pt pickup. DAYNA provided Unit RN station ([phone]) for contact, and informed agent that pt will be at the ED entrance.     155 DAYNA updated SENG Phillips regarding transportation arrangements.    DAYNA will continue to follow as needed.    RADHA Reese, [facility]  ED/OBS   [facility]  Phone: [phone]  Pager: [phone]  Fax: [phone]     On-call pager, [phone], 4:00 pm to midnight

## 2023-02-20 NOTE — PROGRESS NOTES
"/82 (BP Location: Left arm)   Pulse 75   Temp 98  F (36.7  C) (Oral)   Resp 18   Ht 1.6 m (5' 3\")   Wt 67.9 kg (149 lb 11.1 oz)   SpO2 99%   BMI 26.52 kg/m      Patient's condition and vital Signs are stable/WNL. Discharge instructions reviewed with patient and questions answered. Patient verbalizes understanding. PIV removed. Pain under control.  Patient is tolerating regular diet and denies any N/V. Patient to be discharged to the shelter via taxi. Patient has all belongings.    "

## 2023-02-20 NOTE — PLAN OF CARE
Temp: 98.4  F (36.9  C) Temp src: Oral BP: 125/64 Pulse: 73   Resp: 18 SpO2: 98 % O2 Device: None (Room air)      Neuro: A/O x4, behavior appropriate.  No tele.  Resp: On room air, no SOB, lungs clear.  GI/: Voiding in urinal and toilet. Declines senna. Appetite good, requesting extra snacks. FSBG remain elevated, new PO diabetes agents added today, sliding scale insulin given. Checking FSBG pre-meal and 2 hours post.  Skin: Pt states more edema in L forearm today, but decreased edema in L hand. MD updated, advised to continue to monitor, possible fluid shift, elevate arm as tolerated.   Pain: Using tylenol and ibuprofen scheduled, rates pain 7/10.  Up ad lexi in room, in isolation for MRSA.     Assess for changes, continue plan of care.         Goal Outcome Evaluation:      Plan of Care Reviewed With: patient

## 2023-02-20 NOTE — PROGRESS NOTES
Brief Social Work Note    Expected Discharge Date:  TBD     Concerns to be Addressed:    Pt contacts    Additional Information:    Per chat review, Pt contacts are Holly Montes ([phone] - Marcus; or [phone]).    1025 SW left  with [employer] ([phone]) asking for a call back. No PHI was disclosed in the VM     1028 SW called Marcus at [facility] [phone]) and then at [phone]) SW was unable to leave  as both mailboxes were full.    SW will continue to follow as needed.    RADHA Reese, LGSW  ED/OBS   [facility]  Phone: [phone]  Pager: [phone]  Fax: [phone]     On-call pager, [phone], 4:00 pm to midnight

## 2023-02-20 NOTE — CONSULTS
Discharge Pharmacy Test Claim    Patient's [place] Medicaid plan covers bytriceon bcise, byetta, and victoza with $0 copay.    Test Claim Copay   bydureon bcise 0.00   byetta 0.00   victoza 0.00       Lucy Martin  [facility] Liaison  Ph: [phone] Pager: [phone]   Securely message with the Vocera Web Console (learn more here)

## 2023-02-20 NOTE — PROGRESS NOTES
Assessment and plan:  Andrew Collier is a 57 year old male who was admitted on 2/15/2023.  He has a medical history of IV methamphetamine abuse, anxiety, depression and HIV admitted to ED observation for antibiotic treatment or L wrist cellulitis at an IV injection site.  Transferred to inpatient internal medicine for ongoing fevers, pain and elevation in inflammatory markers.    Type 2 DM, poorly controlled   Unstable housing condition - cannot maintain insulin    Current inpatient regimen  - Metformin  2 gram daily  -Glimepiride 4mg twice daily  -Add Januvia 100 mg daily  -  novolog sliding scale  - Hypoglycemia protocol  - BG checks AC, HS     Discharge recommendations    - Metformin 2 gram daily  -Glimepiride 4mg twice daily  -Januvia 100 mg daily  -No insulin at discharge    Follow-up with endocrinology or PCP as outpatient    Care discussed with patient and primary team    JEANETTE Decker    35 minutes spent on the date of the encounter doing chart review, history and exam, documentation and further activities per the note     Over 50% of my time on the unit was spent counseling the patient and/or coordinating care regarding acute hyperglycemia management.  See note for details.  =====================================================================    History of Present Illness  Andrew Collier is a 57 year old male who was admitted on 2/15/2023.  He has a medical history of IV methamphetamine abuse, anxiety, depression and HIV admitted to ED observation for antibiotic treatment or L wrist cellulitis at an IV injection site.  Transferred to inpatient internal medicine for ongoing fevers, pain and elevation in inflammatory markers.     He was seen by endocrine service in previous hospitalizations and follows / [facility] in the clinic, but has not seen since 10/2021.  He was a no show for his endo appt on 11/1/22.     Interval history:  Denies any acute complaints today  Eating  "well  Potential diabetes medications reviewed with patient    Recent Labs   Lab 02/20/23  1020 02/20/23  0633 02/20/23  0156 02/19/23  2158 02/19/23  1929 02/19/23  1246   * 172* 274* 290* 436* 232*       Diabetes Type:  Type 2 Diabetes  Diabetes Duration:  8 year duration  Usual Diabetes Regimen:   Does not check sugars    Medications:                  -Metformin 500 mg 4 pills per day     Reports cannot handle insulin with his current housing situation.     Reports he currently has health insurance    Ability to [place] Prescribed Regimen: yes  Diabetes Control:   Lab Results   Component Value Date    A1C 12.8 12/07/2022    A1C 12.8 11/17/2022    A1C 11.2 02/18/2022    A1C 11.5 01/22/2022    A1C 11.9 12/24/2021    A1C 9.7 11/11/2020    A1C 8.2 09/25/2020    A1C 8.2 08/27/2020    A1C 11.6 02/11/2020    A1C 12.3 01/07/2020     Diabetes Complications: no recent eye exam, + peripheral neuropathy, denies nephropathy  History of DKA: denies  Able to Detect Hypoglycemia: yes  Usual Diabetes Care Provider: [facility], but not seen since 10/2021  Factors Impacting Glucose Control: unstable housing situation      Physical Examination:  Vital signs:  Temp: 98  F (36.7  C) Temp src: Oral BP: 133/82 Pulse: 75   Resp: 18 SpO2: 99 % O2 Device: None (Room air)   Height: 160 cm (5' 3\") Weight: 67.9 kg (149 lb 11.1 oz)  Estimated body mass index is 26.52 kg/m  as calculated from the following:    Height as of this encounter: 1.6 m (5' 3\").    Weight as of this encounter: 67.9 kg (149 lb 11.1 oz).    General: pleasant, in no distress.    Mental status:  alert, oriented to self, place, time    Current Diet  Orders Placed This Encounter      Regular Diet Adult      Diet              "

## 2023-02-20 NOTE — DISCHARGE SUMMARY
[facility]  Discharge Summary - Medicine & Pediatrics       Date of Admission:  2/15/2023  Date of Discharge:  2/20/2023  Discharging Provider: Dr. Esther Mauro  Discharge Service: Hospitalist Service, GOLD TEAM 9    Discharge Diagnoses   Sepsis due to left wrist cellulitis  Methamphetamine use, in acute withdrawal  Diabetes mellitus type 2  HIV infection  GERD  Generalized anxiety disorder  Major depressive disorder  Hyponatremia  Hypokalemia  Hypomagnesmia    Follow-ups Needed After Discharge   Follow-up Appointments     Adult [facility] Follow-up and recommended labs and tests      1. Follow-up in the HIV clinic as scheduled (or as needed between 8a-4p   during weekdays) for follow-up.  2. The Endocrinology doctors will also try and get you in clinic to   follow-up with them for blood sugar control.    Appointments on [place] and/or [facility] (with [facility] or [facility]   provider or service). Call [phone] if you haven't heard regarding   these appointments within 7 days of discharge.             Unresulted Labs Ordered in the Past 30 Days of this Admission     Date and Time Order Name Status Description    2/16/2023 11:44 AM Blood Culture Hand, Right Preliminary     2/16/2023 11:44 AM Blood Culture Hand, Right Preliminary       These results will be followed up by PCP    Discharge Disposition   Discharged to [facility]  Condition at discharge: Stable    Hospital Course   Andrew Collier was admitted on 2/15/2023 for presumed cellulitis of the left hand and forearm, mechanism of infection suspected to be an infected needle used to inject methamphetamine.  The following problems were addressed during his hospitalization:    Sepsis due to left wrist cellulitis  Patient presented with pain and swelling in dorsal hand following injection of methamphetamine into a dorsal hand vein the preceding night. Imaging on admission, including CT, showed dorsal  subcutaneous edema without abscess, consistent with cellulitis. Of note, the CT also demonstrated tenosynovitis of the second and third extensor compartments, possibly infectious. Patient was initially admitted to ED observation and was started on IV antibiotics with vancomycin and ceftriaxone. The orthopedics team was consulted and advised pursuing medical management with IV antibiotics as there was no concern for compartment syndrome or deep infection. The patient was admitted to the internal medicine service after he developed fever, uptrending CRP, and leukocytosis to 12.1, although his blood culture remained with no growth. IV antibiotics were broadened to vancomycin and zosyn. Patient noted to be clinically worsened on hospital day 2 with increased edema, erythema, and pain in left hand. A repeat CT was obtained that again demonstrated no abscess, but showed progression of tenosynovitis. Orthopedics was re-engaged and again advised continued medical management and close observation with no indication for surgical intervention.    The patient then improved for several days on IV antibiotics with improved pain, swelling, and range of motion in the left hand and wrist. Leukocytosis resolved and CRP downtrended.  A MRI was ordered to further investigate the observed tenosynovitis, which showed mild synovial enhancement along extensors of the hand, with intact tendons. Following return of this MRI, the etiology of tenosynovitis was favored to be inflammatory with low suspicion for an infectious origin.  Patient was transitioned to PO bactrim and levofloxacin, which he tolerated with continued clinical improved. Pain was a significant concern throughout the hospitalization and was managed with a combination of tylenol, ibuprofen, oxycodone, hydromorphone, and toradol. Patient initially required maintenance IV fluids, but was tolerating appropriate PO intake by day 2 of his hospitalization. On day of discharge, he  "was tolerating a normal diet and PO antibiotics with pain managed on tylenol and ibuprofen and blood culture no growth for 4 days. He was thus deemed appropriate for discharge. He will continue PO antibiotic agents until 2/23 to complete a 7 day course.     Diabetes mellitus type 2  Patient has a history of type 2 diabetes, poorly controlled due to inability tolerate insulin and difficulty managing insulin and other medications in the setting of unstable housing. His most recent HbA1c was 12.8 in 12/2022. On admission, his only PTA diabetes medication was metformin, which was temporarily held due to his acute illness. While inpatient, his diabetes was managed with sliding scale insulin. He received basal insulin one day, prior to transitioning to glipizide in anticipation of discharge without basal insulin. His blood sugars were overall poorly controlled while inpatient, seemingly due to poor compliance with diet and post-prandial corrections. Endocrinology was consulted to aid in developing a feasible plan for outpatient diabetes management given his complex social situation. Per their recommendations, he was discharged on metformin 2g daily, glimepiride 4mg BID, and sitagliptin 100mg daily. On discharge, he was advise to not start glimepiride until after the completion of his antibiotic course to avoid hypoglycemia with levofloxacin. He should follow up in the outpatient endocrinology clinic for diabetes management.     Generalized anxiety disorder  Major depressive disorder  Methamphetamine use, in acute withdrawal  Patient endorsed a long history of methamphetamine use, though also endorsed some prolonged periods of sobriety. His route of use has mostly been smoking. Injection prior to this hospitalization was his first attempt injecting methamphetamine, which he described as a \"terrible\" mistake. He reported he takes methamphetamine to help with energy levels. Discussed options for treatment including rehab or " trial of medication to help with energy (I.e. wellbutrin). He was open to trying this and was started on Wellbutrin 150mg XR daily. He was not interested in other treatment at this time. He should continue Wellbutrin upon discharge.     Hyponatremia  Patient had mild hyponatremia on admission, suspected hypovolemic in the setting of acute illness and poor PO intake. He was fluid resuscitated and continued on maintenance IV fluids until his PO intake returned to baseline. His sodium level returned to normal limits on hospital day 3 and was within normal limits on the day of discharge.    Hypokalemia  Hypomagnesmia  Patient was found hypokalemia and hypomagnesmic on routine BMP during hospital day 2. Potassium and magnesium were replaced as needed throughout this hospitalization and were both within normal limits on the day of discharge.     HIV infection  Patient was diagnosed with HIV in 2015 and has had an undetectable viral load for several years on anti-retrovirals. PTA Biktarvy was continued throughout this hospitalization. He should continue to follow up in the infectious disease clinic.     GERD  PTA omeprazole was continued throughout this hospitalization.     Housing insecurity  Pt unhoused. Has been staying in the [facility] for the past year. No concerns about safety there, felt comfortable with the plan to discharge there after his hospitalization.     Consultations This Hospital Stay   PHARMACY TO DOSE VANCO  ORTHOPAEDIC SURGERY ADULT/PEDS IP CONSULT  CARE MANAGEMENT / SOCIAL WORK IP CONSULT  ORTHOPAEDIC SURGERY ADULT/PEDS IP CONSULT  NURSING TO CONSULT FOR VASCULAR ACCESS CARE IP CONSULT  ENDOCRINE DIABETES ADULT IP CONSULT  PHARMACY IP CONSULT  PHARMACY LIAISON FOR MEDICATION COVERAGE CONSULT  PHARMACY LIAISON FOR MEDICATION COVERAGE CONSULT    Code Status   Full Code       Esther Mauro MD  [facility]  [facility] [address]  [address]  [facility] [address]  Phone: [phone]  Fax: [phone]  ______________________________________________________________________    Physical Exam   Vital Signs: Temp: 98  F (36.7  C) Temp src: Oral BP: 133/82 Pulse: 75   Resp: 18 SpO2: 99 % O2 Device: None (Room air)    Weight: 149 lbs 11.08 oz     Constitutional: Awake, alert, and oriented. Resting comfortably in bed. In no acute distress.   Respiratory: Breathing comfortably on room air. Lungs clear to auscultation bilaterally.   Cardiovascular: RRR, well perfused.   GI: Abdomen soft, non-distended, and non-tender. Normoactive bowel sounds.   Musculoskeletal: Spontaneously moves all extremities. No lower leg edema. Left dorsal hand and forearm swollen and mildly erythematous, improved from previous days. Area  to palpation, but significantly improved compared to previous days. ROM in flexion and extension at left wrist approximately 90 degrees, improved from previous days.   Neurologic: Alert and oriented. Cranial nerves grossly intact.       Primary Care Physician   Chevy Reynoso    Discharge Orders      Primary Care - Care Coordination Referral      Activity    Your activity upon discharge: activity as tolerated     Adult [facility] Follow-up and recommended labs and tests    1. Follow-up in the HIV clinic as scheduled (or as needed between 8a-4p during weekdays) for follow-up.  2. The Endocrinology doctors will also try and get you in clinic to follow-up with them for blood sugar control.    Appointments on [place] and/or [facility] (with [facility] or [facility] provider or service). Call [phone] if you haven't heard regarding these appointments within 7 days of discharge.     Reason for your hospital stay    You were admitted with pain from an infection in the hand and forearm. We did imaging studies which showed no evidence of infection in the muscle or bone. The orthopedic doctors saw you and did not recommend surgery. You  improved with antibiotics and pain control. Continue taking antibiotics for an additional 3 days to finish your antibiotic course. For pain, take Tylenol and Ibuprofen. Your pain may last for more days, but this is not related to ongoing infection. The diabetes doctors also saw you and we will discharge you on a regimen of 3 pills for diabetes control moving forward; BUT would recommend HOLDING the Glimepiride (Amaryl) medication until you finish your antibiotics!     Diet    Follow this diet upon discharge: Orders Placed This Encounter      Regular Diet Adult       Significant Results and Procedures   Results for orders placed or performed during the hospital encounter of 02/15/23   US Upper Extremity Non Vascular Left    Narrative    EXAMINATION: US UPPER EXTREMITY NON VASCULAR LEFT, 2/15/2023 8:48 AM     COMPARISON: None.    HISTORY: Distal left forearm swelling. Evaluate for abscess.    TECHNIQUE: Grayscale and limited color doppler imaging acquired in the  left distal forearm in the region of swelling indicated by the  patient. Images acquired on the dorsal and ventral surfaces.    FINDINGS/    Impression    IMPRESSION: Subcutaneous edema is demonstrated in the distal left  forearm and wrist region without drainable fluid collection  identified. There may be some mild hyperemia which likely reflects the  cellulitis/induration present clinically in this region.    APRIL ELIZONDO MD         SYSTEM ID:  [id number]   CT Forearm Left w Contrast    Narrative    EXAM: CT FOREARM LEFT W CONTRAST  2/15/2023 2:18 PM      HISTORY: L distal / dorsal forearm/wrist infection ? necrotizng  infection    COMPARISON: None    TECHNIQUE: CT imaging of the left forearm after the administration of  IV contrast. Contrast: 92 cc Isovue-370. Multiplanar reconstructions    FINDINGS:      images: Unremarkable    No acute osseous abnormality. Borderline ulnar negative variance. No  substantial degenerative change.    Subcutaneous  edema dorsal to the distal forearm and visualized wrist.  No focal rim-enhancing fluid collection. Mild tenosynovial fluid at  the intersection of the second and third extensor compartments. No  subcutaneous emphysema.       Impression    IMPRESSION:     1. Dorsal subcutaneous edema at the level of the wrist and distal  forearm. No focal rim-enhancing fluid collection. No subcutaneous  emphysema.    2. Tenosynovitis at the intersection of the second and third extensor  compartments most substantially affecting the distal extensor carpi  radialis brevis. Infectious tenosynovitis is in the differential.    MARGIE GODWIN MD (Joe)         SYSTEM ID:  [id number]   XR Wrist Port Left G/E 3 Views    Addendum: 2/15/2023    ADDENDUM dictated by Dr. Cornelius Dempsey,, 02/15/2023  Original report dictated by Dr. Cornelius Dempsey, 02/15/2023    The following report supersedes the previously distributed report. Changes have been made to the original report as indicated by double parentheses (( )). Please disregard previous report.     EXAM: XR WRIST PORT LEFT G/E 3 VIEWS  LOCATION: [facility]  DATE/TIME: 2/15/2023 5:54 PM    INDICATION: Cellulitis in IV drug user, assessing for retained foreign body  COMPARISON: None.    IMPRESSION: Soft tissue swelling in the hand, wrist and distal forearm. ((No)) foreign body is evident. No soft tissue emphysema. No fractures are identified.    ((Examination discussed with referring clinician.))            Narrative    EXAM: XR WRIST PORT LEFT G/E 3 VIEWS  LOCATION: [facility]  DATE/TIME: 2/15/2023 5:54 PM    INDICATION: cellultis in IV drug user, assesing for retained foreign body  COMPARISON: None.      Impression    IMPRESSION: Soft tissue swelling in the hand, wrist and distal forearm. Foreign body is evident. No soft tissue emphysema. No fractures are identified.   CT Forearm Left w Contrast     Narrative    EXAM: CT FOREARM LEFT W CONTRAST, CT HAND LEFT W CONTRAST  2/17/2023  1:43 PM      HISTORY: hx of IVDU, now with pain, swelling, and pain out of  propotion to exam, eval for necrotizing soft tissue infection    COMPARISON: CT 2/15/2023    TECHNIQUE: CT imaging of the left forearm and hand after the  administration of IV contrast.     Contrast: 90 mL Isovue-370     FINDINGS:     No acute osseous abnormality. Borderline ulnar negative variance. No  substantial degenerative change.    Increased subcutaneous edema now extends from elbow joint to the  dorsal hand wrist at the level of metacarpophalangeal joint.     No focal rim-enhancing fluid collection. Redemonstration of mild  tenosynovial fluid at the intersection of the second and third  extensor compartments. No subcutaneous emphysema.       Impression    IMPRESSION:     1. Increased subcutaneous edema now extends from elbow joint to the  dorsal hand wrist at the level of metacarpophalangeal joint when  compared to CT from 2/15/2023. No focal rim-enhancing fluid  collection. No subcutaneous emphysema.    2. Mild progression of tenosynovitis at the intersection of the second  and third extensor compartments most substantially affecting the  distal extensor carpi radialis brevis since prior CT. Infectious  tenosynovitis is again in the differential.    I have personally reviewed the examination and initial interpretation  and I agree with the findings.    MARGIE GODWIN MD (Joe)         SYSTEM ID:  [id number]   CT Hand Left w Contrast    Narrative    EXAM: CT FOREARM LEFT W CONTRAST, CT HAND LEFT W CONTRAST  2/17/2023  1:43 PM      HISTORY: hx of IVDU, now with pain, swelling, and pain out of  propotion to exam, eval for necrotizing soft tissue infection    COMPARISON: CT 2/15/2023    TECHNIQUE: CT imaging of the left forearm and hand after the  administration of IV contrast.     Contrast: 90 mL Isovue-370     FINDINGS:     No acute osseous  abnormality. Borderline ulnar negative variance. No  substantial degenerative change.    Increased subcutaneous edema now extends from elbow joint to the  dorsal hand wrist at the level of metacarpophalangeal joint.     No focal rim-enhancing fluid collection. Redemonstration of mild  tenosynovial fluid at the intersection of the second and third  extensor compartments. No subcutaneous emphysema.       Impression    IMPRESSION:     1. Increased subcutaneous edema now extends from elbow joint to the  dorsal hand wrist at the level of metacarpophalangeal joint when  compared to CT from 2/15/2023. No focal rim-enhancing fluid  collection. No subcutaneous emphysema.    2. Mild progression of tenosynovitis at the intersection of the second  and third extensor compartments most substantially affecting the  distal extensor carpi radialis brevis since prior CT. Infectious  tenosynovitis is again in the differential.    I have personally reviewed the examination and initial interpretation  and I agree with the findings.    MARGIE GODWIN MD (Joe)         SYSTEM ID:  [id number]   MR Hand Left w/o & w Contrast    Narrative    EXAM: MR HAND LEFT W/O and W CONTRAST, MR WRIST LEFT W/O and W CONTRAST  LOCATION: [facility]  DATE/TIME: 2/18/2023 11:43 AM    INDICATION: Left hand tenosynovitis.  COMPARISON: None.  TECHNIQUE: Routine. Additional postgadolinium T1 sequences were obtained.  IV CONTRAST: 7.5 mL Gadavist    FINDINGS:     TENDONS:   -Extensor tendons: The extensor tendons are intact but there is some enhancing synovium about the tendon sheaths of the extensor digitorum, extensor carpi radialis brevis and longus, and extensor carpi ulnaris tendons.  -Flexor tendons: No tear, tendinopathy, or tenosynovitis.    LIGAMENTS:  -The attachments of the triangular fibrocartilage complex are intact. Trace fluid in the DRUJ but no diastasis of the joint interval. The scapholunate and  lunotriquetral ligaments are intact. The collateral ligaments at the 1st CMC joint are marginally   thickened but this is likely chronic and reactive. No tearing. The collateral ligaments at the remaining MCP and IP joints are all intact.    JOINTS AND BONES:   -No evidence for fracture or significant bone marrow signal abnormality. No evidence for focal bone lesion. No significant joint effusion. No significant synovial enhancement at the joint margins.    MUSCLES AND SOFT TISSUES:   -Extensive but nonspecific edema or cellulitis is seen along the dorsal aspect of the wrist and hand. No evidence for organized fluid collection to suggest abscess.      Impression    IMPRESSION:  1.  Extensive but nonspecific edema or cellulitis along the dorsal aspect of the hand and wrist without evidence for organized fluid collection.  2.  No evidence for fracture or marrow signal abnormality. No evidence for osteomyelitis.  3.  No significant effusion to suggest septic arthropathy.  4.  There is mild synovial enhancement around the tendon sheaths of the extensor carpi radialis brevis and longus, extensor digitorum, and extensor carpi ulnaris tendons without significant fluid. The tendons themselves are intact.  5.  No erosive changes are identified.  6.  Exam otherwise negative.   MR Wrist Left w/o & w Contrast    Narrative    EXAM: MR HAND LEFT W/O and W CONTRAST, MR WRIST LEFT W/O and W CONTRAST  LOCATION: [facility]  DATE/TIME: 2/18/2023 11:43 AM    INDICATION: Left hand tenosynovitis.  COMPARISON: None.  TECHNIQUE: Routine. Additional postgadolinium T1 sequences were obtained.  IV CONTRAST: 7.5 mL Gadavist    FINDINGS:     TENDONS:   -Extensor tendons: The extensor tendons are intact but there is some enhancing synovium about the tendon sheaths of the extensor digitorum, extensor carpi radialis brevis and longus, and extensor carpi ulnaris tendons.  -Flexor tendons: No tear,  tendinopathy, or tenosynovitis.    LIGAMENTS:  -The attachments of the triangular fibrocartilage complex are intact. Trace fluid in the DRUJ but no diastasis of the joint interval. The scapholunate and lunotriquetral ligaments are intact. The collateral ligaments at the 1st CMC joint are marginally   thickened but this is likely chronic and reactive. No tearing. The collateral ligaments at the remaining MCP and IP joints are all intact.    JOINTS AND BONES:   -No evidence for fracture or significant bone marrow signal abnormality. No evidence for focal bone lesion. No significant joint effusion. No significant synovial enhancement at the joint margins.    MUSCLES AND SOFT TISSUES:   -Extensive but nonspecific edema or cellulitis is seen along the dorsal aspect of the wrist and hand. No evidence for organized fluid collection to suggest abscess.      Impression    IMPRESSION:  1.  Extensive but nonspecific edema or cellulitis along the dorsal aspect of the hand and wrist without evidence for organized fluid collection.  2.  No evidence for fracture or marrow signal abnormality. No evidence for osteomyelitis.  3.  No significant effusion to suggest septic arthropathy.  4.  There is mild synovial enhancement around the tendon sheaths of the extensor carpi radialis brevis and longus, extensor digitorum, and extensor carpi ulnaris tendons without significant fluid. The tendons themselves are intact.  5.  No erosive changes are identified.  6.  Exam otherwise negative.     *Note: Due to a large number of results and/or encounters for the requested time period, some results have not been displayed. A complete set of results can be found in Results Review.       Discharge Medications   Current Discharge Medication List      START taking these medications    Details   acetaminophen (TYLENOL) 500 MG tablet Take 1-2 tablets (500-1,000 mg) by mouth every 6 hours as needed for mild pain  Qty: 90 tablet, Refills: 0    Associated  Diagnoses: Cellulitis of left wrist      buPROPion (WELLBUTRIN XL) 150 MG 24 hr tablet Take 1 tablet (150 mg) by mouth daily  Qty: 60 tablet, Refills: 0    Associated Diagnoses: Methamphetamine abuse (H)      glimepiride (AMARYL) 4 MG tablet Take 1 tablet (4 mg) by mouth 2 times daily (before meals)  Qty: 90 tablet, Refills: 1    Associated Diagnoses: Type 2 diabetes mellitus without complication, without long-term current use of insulin (H)      ibuprofen (ADVIL/MOTRIN) 600 MG tablet Take 1 tablet (600 mg) by mouth every 6 hours as needed for moderate pain (4-6)  Qty: 90 tablet, Refills: 0    Associated Diagnoses: Cellulitis of left wrist      levofloxacin (LEVAQUIN) 500 MG tablet Take 1 tablet (500 mg) by mouth daily  Qty: 3 tablet, Refills: 0    Associated Diagnoses: Cellulitis of left wrist      sitagliptin (JANUVIA) 100 MG tablet Take 1 tablet (100 mg) by mouth daily  Qty: 60 tablet, Refills: 0    Associated Diagnoses: Type 2 diabetes mellitus without complication, without long-term current use of insulin (H)      sulfamethoxazole-trimethoprim (BACTRIM DS) 800-160 MG tablet Take 1 tablet by mouth 2 times daily  Qty: 7 tablet, Refills: 0    Associated Diagnoses: Cellulitis of left wrist         CONTINUE these medications which have CHANGED    Details   famotidine (PEPCID) 40 MG tablet Take 1 tablet (40 mg) by mouth daily  Qty: 60 tablet, Refills: 1    Associated Diagnoses: Gastroesophageal reflux disease, unspecified whether esophagitis present      metFORMIN (FORTAMET) 1000 MG 24 hr tablet Take 2 tablets (2,000 mg) by mouth daily (with dinner)  Qty: 120 tablet, Refills: 0    Associated Diagnoses: Type 2 diabetes mellitus without complication, without long-term current use of insulin (H)         CONTINUE these medications which have NOT CHANGED    Details   bictegravir-emtricitabine-tenofovir (BIKTARVY) -25 MG per tablet Take 1 tablet by mouth daily  Qty: 30 tablet, Refills: 2    Associated Diagnoses:  "Human immunodeficiency virus (HIV) disease (H)      blood glucose (NO BRAND SPECIFIED) lancets standard Use to test blood sugar 1 time daily or as directed.  Qty: 100 lancet, Refills: 4    Associated Diagnoses: Type 2 diabetes mellitus with hyperglycemia, with long-term current use of insulin (H)      blood glucose (NO BRAND SPECIFIED) test strip Use to test blood sugar 1 time daily or as directed.  Qty: 100 strip, Refills: 4    Associated Diagnoses: Type 2 diabetes mellitus with hyperglycemia, with long-term current use of insulin (H)      blood glucose monitoring (NO BRAND SPECIFIED) meter device kit Use to test blood sugar 1 time daily or as directed.  Qty: 1 kit, Refills: 0    Associated Diagnoses: Type 2 diabetes mellitus with hyperglycemia, with long-term current use of insulin (H)           Allergies   Allergies   Allergen Reactions     Fentanyl Blisters     Per pt, after taking this medication had blisters develope     Tylenol [Acetaminophen] Itching     Dulaglutide Rash     Insulin Lispro Rash     Patient reported     Penicillin V Other (See Comments) and Rash     Diffuse maculopapular rash + feels \"high\", per pt.      "

## 2023-02-20 NOTE — PLAN OF CARE
"Goal Outcome Evaluation:  Shift: 0700 - 1600      Plan of Care Reviewed With: patient    Overall Patient Progress: improving    Reason for admission: Cellulitis of Left hand/wrist  Vitals: Afebrile, VSS, on RA  /75 (BP Location: Right arm)   Pulse 72   Temp 98.1  F (36.7  C) (Oral)   Resp 16   Ht 1.6 m (5' 3\")   Wt 67.9 kg (149 lb 11.1 oz)   SpO2 100%   BMI 26.52 kg/m    Activity: Independent  Pain: pt continues to have pain to left hand/wrist, managed /c scheduled Tylenol and Ibuprofen.   Neuro: A&O, behavior appropriate  Cardiac:   Respiratory: on RA, no cough noted  GI/: pt reports diarrhea now after being constipated, stool softner/laxatives held. Urinal at bedside, Voiding spontaneously.   Diet: Regular Diet, Breakfast ordered @ 0930  Lines: PIV to Right lower Forearm - SL  Wounds/Incisions: Swelling noted to Left hand/wrist, improving - encourage to elevate on pillow  Labs/imaging/Consults:   Discharge Plan: Continue pain management and po meds for Diabetes management - no insulin at discharge, pt ready to discharge later today, with SW to setup ride.    Emily Golden RN BSN PHN  "

## 2023-02-20 NOTE — CONSULTS
Discharge Pharmacy Test Claim    Januvia is covered with $0 copay through patient's [place] Medicaid plan.    Test Claim Copay   januvia 0.00       Lucy Martin  [facility] Liaison  Ph: [phone] Pager: [phone]   Securely message with the Vocera Web Console (learn more here)

## 2023-02-20 NOTE — PLAN OF CARE
"Goal Outcome Evaluation:      Plan of Care Reviewed With: patient    Overall Patient Progress: no changeOverall Patient Progress: no change  /75 (BP Location: Right arm)   Pulse 71   Temp 98.1  F (36.7  C) (Oral)   Resp 18   Ht 1.6 m (5' 3\")   Wt 67.9 kg (149 lb 11.1 oz)   SpO2 100%   BMI 26.52 kg/m   RA.   Neuro: A/O x4, behavior appropriate.  No tele.  Resp: On room air, no SOB, lungs clear.  GI/: Voiding in urinal and toilet. Declines senna. Appetite good, requesting extra snacks. FSBG remain elevated, new PO diabetes agents added today, sliding scale insulin given. Checking FSBG pre-meal and 2 hours post.  Skin: Pt states more edema in L forearm today, but decreased edema in L hand. MD updated, advised to continue to monitor, possible fluid shift, elevate arm as tolerated.   Pain: Using tylenol and ibuprofen scheduled, rates pain 7/10.  Up ad lexi in room, in isolation for MRSA.       "

## 2023-02-21 LAB
BACTERIA BLD CULT: NO GROWTH
BACTERIA BLD CULT: NO GROWTH

## 2023-03-13 ENCOUNTER — APPOINTMENT (OUTPATIENT)
Dept: GENERAL RADIOLOGY | Facility: CLINIC | Age: 58
End: 2023-03-13
Attending: EMERGENCY MEDICINE
Payer: MEDICAID

## 2023-03-13 ENCOUNTER — HOSPITAL ENCOUNTER (EMERGENCY)
Facility: CLINIC | Age: 58
Discharge: HOME | End: 2023-03-13
Attending: EMERGENCY MEDICINE | Admitting: EMERGENCY MEDICINE
Payer: MEDICAID

## 2023-03-13 VITALS
WEIGHT: 140 LBS | HEIGHT: 63 IN | OXYGEN SATURATION: 100 % | RESPIRATION RATE: 16 BRPM | SYSTOLIC BLOOD PRESSURE: 128 MMHG | TEMPERATURE: 98.4 F | HEART RATE: 86 BPM | DIASTOLIC BLOOD PRESSURE: 74 MMHG | BODY MASS INDEX: 24.8 KG/M2

## 2023-03-13 DIAGNOSIS — J06.9 VIRAL URI WITH COUGH: ICD-10-CM

## 2023-03-13 DIAGNOSIS — Z20.822 LAB TEST NEGATIVE FOR COVID-19 VIRUS: ICD-10-CM

## 2023-03-13 LAB
FLUAV RNA SPEC QL NAA+PROBE: NEGATIVE
FLUBV RNA RESP QL NAA+PROBE: NEGATIVE
RSV RNA SPEC NAA+PROBE: NEGATIVE
SARS-COV-2 RNA RESP QL NAA+PROBE: NEGATIVE

## 2023-03-13 PROCEDURE — 71046 X-RAY EXAM CHEST 2 VIEWS: CPT

## 2023-03-13 PROCEDURE — 87637 SARSCOV2&INF A&B&RSV AMP PRB: CPT | Performed by: EMERGENCY MEDICINE

## 2023-03-13 PROCEDURE — 71046 X-RAY EXAM CHEST 2 VIEWS: CPT | Mod: 26 | Performed by: RADIOLOGY

## 2023-03-13 PROCEDURE — 87637 SARSCOV2&INF A&B&RSV AMP PRB: CPT | Mod: 59 | Performed by: EMERGENCY MEDICINE

## 2023-03-13 PROCEDURE — C9803 HOPD COVID-19 SPEC COLLECT: HCPCS | Performed by: EMERGENCY MEDICINE

## 2023-03-13 PROCEDURE — 99283 EMERGENCY DEPT VISIT LOW MDM: CPT | Mod: CS | Performed by: EMERGENCY MEDICINE

## 2023-03-13 PROCEDURE — 99284 EMERGENCY DEPT VISIT MOD MDM: CPT | Mod: CS,25 | Performed by: EMERGENCY MEDICINE

## 2023-03-13 ASSESSMENT — ACTIVITIES OF DAILY LIVING (ADL): ADLS_ACUITY_SCORE: 37

## 2023-03-13 NOTE — ED TRIAGE NOTES
"From home with c/o general flu like symptoms.  Dry cough, SOB, headache, sinus congestion x 1 day.  \"I feel like I have covid.\"  Vitals stable, afebrile.      Triage Assessment     Row Name 03/13/23 0836       Triage Assessment (Adult)    Airway WDL WDL       Respiratory WDL    Respiratory WDL X;cough    Cough Frequency frequent    Cough Type dry       Skin Circulation/Temperature WDL    Skin Circulation/Temperature WDL WDL       Cardiac WDL    Cardiac WDL WDL       Peripheral/Neurovascular WDL    Peripheral Neurovascular WDL WDL       Cognitive/Neuro/Behavioral WDL    Cognitive/Neuro/Behavioral WDL WDL              "

## 2023-03-13 NOTE — ED PROVIDER NOTES
"    [place] EMERGENCY DEPARTMENT ([facility])    3/13/23       ED PROVIDER NOTE   ED 23  8:56 AM   History     Chief Complaint   Patient presents with     Flu Symptoms     The history is provided by the patient and medical records.     Andrew Collier is a 57 year old male with history of type II diabetes mellitus, IV meth use, HIV, and recent hospitalization for cellulitis, sepsis and metabolic abnormalities, housing insecurity who presents with dry cough, shortness of breath, headache, sinus congestion x 1 day. He reported to triage nurse, \"I feel like I have covid.\" He also admits to diarrhea x1 day. He denies any CP/SOB/F/C/abd pain/N/V. He has no other complaints. He does state he has had COVID twice even though he is vaccinated. He currently lives in a shelter.    CareEverywhere records reviewed, patient has had 4 doses of COVID-19 vaccination. No flu shot this year.     Past Medical History  Past Medical History:   Diagnosis Date     Acute appendicitis with localized peritonitis 1/31/2018     AIDS (H)      Allergic rhinitis due to other allergen     DNS     Anal dysplasia      Chronic abdominal pain      CNS toxoplasmosis (H)      COVID-19 1/11/2022     Diabetes type 2, controlled (H)      GERD (gastroesophageal reflux disease)      Herpes zoster 9/23/2016     History of seizure      History of substance abuse (H)      HIV (human immunodeficiency virus infection) (H)      HLD (hyperlipidemia)      Lung nodules      Periungual wart      Pneumonia 1/6/2019     PTSD (post-traumatic stress disorder)      Sleep apnea     doesn't use CPAP     Past Surgical History:   Procedure Laterality Date     ANOSCOPY N/A 9/9/2020    Procedure: Exam Under Anesthesia, ANOSCOPY, fulgeration of rectal fissures with Rectal Biopsies;  Surgeon: Thanh Lundberg MD;  Location: [facility] OR     COLONOSCOPY Left 1/22/2016    Procedure: COMBINED COLONOSCOPY, SINGLE OR MULTIPLE BIOPSY/POLYPECTOMY BY BIOPSY;  Surgeon: Parveen" Clark Castillo MD;  Location: [facility] GI     ESOPHAGOSCOPY, GASTROSCOPY, DUODENOSCOPY (EGD), COMBINED N/A 6/6/2022    Procedure: ESOPHAGOGASTRODUODENOSCOPY, WITH BIOPSY;  Surgeon: Kecia Benítez MD;  Location: [facility] GI     HC EXPLORE UNDESC TESTIS,INGUIN/SCROTAL       LAPAROSCOPIC APPENDECTOMY N/A 1/31/2018    Procedure: LAPAROSCOPIC APPENDECTOMY;  LAPAROSCOPIC APPENDECTOMY;  Surgeon: Dawn Holt MD;  Location: [facility] OR     LAPAROSCOPY DIAGNOSTIC (GENERAL) N/A 7/26/2016    Procedure: LAPAROSCOPY DIAGNOSTIC (GENERAL);  Surgeon: Susannah Arriaga MD;  Location: [facility] OR     LAPAROSCOPY DIAGNOSTIC (GENERAL) N/A 4/16/2018    Procedure: LAPAROSCOPY DIAGNOSTIC (GENERAL);  Diagnostic laparoscopy and lysis of adhesions;  Surgeon: Prince Dowling MD;  Location: [facility] OR     OPTICAL TRACKING SYSTEM CRANIOTOMY, EXCISE TUMOR, COMBINED Left 4/10/2015    Procedure: COMBINED OPTICAL TRACKING SYSTEM CRANIOTOMY, EXCISE TUMOR;  Surgeon: Mirlande Colmenares MD;  Location: [facility] OR     REPAIR GAMEKEEPER'S THUMB Right 12/2/2016    Procedure: REPAIR LIGAMENT ULNAR COLLATERAL THUMB (GAMEKEEPER'S);  Surgeon: Evin Zamorano MD;  Location: [facility] OR     ZZC NONSPECIFIC PROCEDURE      right forearm fracture     acetaminophen (TYLENOL) 500 MG tablet  bictegravir-emtricitabine-tenofovir (BIKTARVY) -25 MG per tablet  blood glucose (NO BRAND SPECIFIED) lancets standard  blood glucose (NO BRAND SPECIFIED) test strip  blood glucose monitoring (NO BRAND SPECIFIED) meter device kit  buPROPion (WELLBUTRIN XL) 150 MG 24 hr tablet  famotidine (PEPCID) 40 MG tablet  glimepiride (AMARYL) 4 MG tablet  ibuprofen (ADVIL/MOTRIN) 600 MG tablet  levofloxacin (LEVAQUIN) 500 MG tablet  metFORMIN (FORTAMET) 1000 MG 24 hr tablet  sitagliptin (JANUVIA) 100 MG tablet  sulfamethoxazole-trimethoprim (BACTRIM DS) 800-160 MG tablet      Allergies   Allergen Reactions     Fentanyl Blisters     Per pt, after taking this medication had blisters develope      "Tylenol [Acetaminophen] Itching     Dulaglutide Rash     Insulin Lispro Rash     Patient reported     Penicillin V Other (See Comments) and Rash     Diffuse maculopapular rash + feels \"high\", per pt.      Family History  Family History   Problem Relation Age of Onset     Diabetes Brother      Diabetes Father      Alzheimer Disease Father      Unknown/Adopted Mother      Diabetes Paternal Grandfather      Cancer No family hx of         no skin cancer     Skin Cancer No family hx of         no famiy hx of skin cancer     Glaucoma No family hx of      Macular Degeneration No family hx of      Social History   Social History     Tobacco Use     Smoking status: Some Days     Packs/day: 0.25     Years: 40.00     Pack years: 10.00     Types: Cigarettes     Smokeless tobacco: Former   Substance Use Topics     Alcohol use: No     Alcohol/week: 0.0 standard drinks     Comment: Last etoh in 2007     Drug use: Not Currently     Types: Marijuana, Methamphetamines         A medically appropriate review of systems was performed with pertinent positives and negatives noted in the HPI, and all other systems negative.    Physical Exam   BP: 133/78  Pulse: 100  Temp: 98.4  F (36.9  C)  Resp: 20  Height: 160 cm (5' 3\")  Weight: 63.5 kg (140 lb)  SpO2: 100 %  Physical Exam  Constitutional:       General: He is not in acute distress.     Appearance: He is not diaphoretic.   HENT:      Head: Atraumatic.   Eyes:      General: No scleral icterus.     Pupils: Pupils are equal, round, and reactive to light.   Cardiovascular:      Rate and Rhythm: Normal rate and regular rhythm.      Heart sounds: Normal heart sounds. No murmur heard.    No friction rub. No gallop.   Pulmonary:      Effort: No respiratory distress.      Breath sounds: Normal breath sounds. No wheezing, rhonchi or rales.   Abdominal:      General: Bowel sounds are normal.      Palpations: Abdomen is soft.      Tenderness: There is no abdominal tenderness. There is no guarding " or rebound.   Musculoskeletal:         General: No tenderness. Normal range of motion.      Cervical back: Normal range of motion and neck supple.   Skin:     General: Skin is warm.      Findings: No erythema or rash.   Neurological:      General: No focal deficit present.      Mental Status: He is alert and oriented to person, place, and time.      Motor: No weakness.   Psychiatric:         Mood and Affect: Mood normal.         Behavior: Behavior normal.         ED Course, Procedures, & Data      Procedures             Results for orders placed or performed during the hospital encounter of 03/13/23   Chest XR,  PA & LAT     Status: None    Narrative    Exam: XR CHEST 2 VIEWS, 3/13/2023 9:59 AM    Comparison: 12/17/2022    History: cough, r/o CAP    Findings:  Cardiac silhouette is within normal limits. No focal opacity. No  pneumothorax. No pleural effusion. The upper abdomen is unremarkable.  No acute osseous abnormality.      Impression    Impression: No acute airspace disease.     I have personally reviewed the examination and initial interpretation  and I agree with the findings.    MATIAS LIZ MD         SYSTEM ID:  [id number]   Symptomatic Influenza A/B, RSV, & SARS-CoV2 PCR (COVID-19) Nasopharyngeal     Status: Normal    Specimen: Nasopharyngeal; Swab   Result Value Ref Range    Influenza A PCR Negative Negative    Influenza B PCR Negative Negative    RSV PCR Negative Negative    SARS CoV2 PCR Negative Negative    Narrative    Testing was performed using the Xpert Xpress CoV2/Flu/RSV Assay on the [employer] GeneXpert Instrument. This test should be ordered for the detection of SARS-CoV-2, influenza, and RSV viruses in individuals who meet clinical and/or epidemiological criteria. Test performance is unknown in asymptomatic patients. This test is for in vitro diagnostic use under the FDA EUA for laboratories certified under CLIA to perform high or moderate complexity testing. This test has not been FDA  cleared or approved. A negative result does not rule out the presence of PCR inhibitors in the specimen or target RNA in concentration below the limit of detection for the assay. If only one viral target is positive but coinfection with multiple targets is suspected, the sample should be re-tested with another FDA cleared, approved, or authorized test, if coinfection would change clinical management. This test was validated by the [facility]. These laboratories are certified under the Clinical Laboratory Improvement Amendments of 1988 (CLIA-88) as qualified to perform high complexity laboratory testing.     Medications - No data to display  Labs Ordered and Resulted from Time of ED Arrival to Time of ED Departure   INFLUENZA A/B, RSV, & SARS-COV2 PCR - Normal       Result Value    Influenza A PCR Negative      Influenza B PCR Negative      RSV PCR Negative      SARS CoV2 PCR Negative       Chest XR,  PA & LAT   Final Result   Impression: No acute airspace disease.       I have personally reviewed the examination and initial interpretation   and I agree with the findings.      MATIAS LIZ MD            SYSTEM ID:  [id number]             Critical care was not performed.     Medical Decision Making  The patient's presentation was of low complexity (an acute and uncomplicated illness or injury).    The patient's evaluation involved:  ordering and/or review of 3+ test(s) in this encounter (see separate area of note for details)    The patient's management necessitated only low risk treatment.      Assessment & Plan    58 yo male here with cough and diarrhea x1 day likely secondary to a viral illness/URI. His CXR was negative for acute findings. He is negative for influenza, RSV and COVID. He will be discharged back to his shelter with symptomatic treatment.    I have reviewed the nursing notes. I have reviewed the findings, diagnosis, plan and need for follow up with the patient.    New  Prescriptions    No medications on file       Final diagnoses:   Viral URI with cough       Hernando Nixon MD   [facility] EMERGENCY DEPARTMENT  3/13/2023     Hernando Nixon MD  03/13/23 1046

## 2023-03-27 ENCOUNTER — ANESTHESIA (OUTPATIENT)
Dept: EMERGENCY MEDICINE | Facility: CLINIC | Age: 58
End: 2023-03-27
Payer: MEDICAID

## 2023-03-28 ENCOUNTER — ANESTHESIA EVENT (OUTPATIENT)
Dept: EMERGENCY MEDICINE | Facility: CLINIC | Age: 58
End: 2023-03-28
Payer: MEDICAID

## 2023-03-28 ENCOUNTER — HOSPITAL ENCOUNTER (INPATIENT)
Facility: CLINIC | Age: 58
LOS: 3 days | Discharge: NURSING, REHAB OR LONG-TERM CARE | End: 2023-03-31
Attending: EMERGENCY MEDICINE | Admitting: STUDENT IN AN ORGANIZED HEALTH CARE EDUCATION/TRAINING PROGRAM
Payer: MEDICAID

## 2023-03-28 ENCOUNTER — APPOINTMENT (OUTPATIENT)
Dept: CT IMAGING | Facility: CLINIC | Age: 58
End: 2023-03-28
Attending: EMERGENCY MEDICINE
Payer: MEDICAID

## 2023-03-28 DIAGNOSIS — K56.609 SMALL BOWEL OBSTRUCTION (H): ICD-10-CM

## 2023-03-28 DIAGNOSIS — R10.9 LEFT SIDED ABDOMINAL PAIN: ICD-10-CM

## 2023-03-28 DIAGNOSIS — K56.609 INTESTINAL OBSTRUCTION, UNSPECIFIED CAUSE, UNSPECIFIED WHETHER PARTIAL OR COMPLETE (H): ICD-10-CM

## 2023-03-28 DIAGNOSIS — R11.2 NAUSEA AND VOMITING, UNSPECIFIED VOMITING TYPE: ICD-10-CM

## 2023-03-28 LAB
ALBUMIN SERPL BCG-MCNC: 4.4 G/DL (ref 3.5–5.2)
ALP SERPL-CCNC: 134 U/L (ref 40–129)
ALT SERPL W P-5'-P-CCNC: 14 U/L (ref 10–50)
ANION GAP SERPL CALCULATED.3IONS-SCNC: 14 MMOL/L (ref 7–15)
APTT PPP: 27 SECONDS (ref 22–38)
AST SERPL W P-5'-P-CCNC: 20 U/L (ref 10–50)
BASOPHILS # BLD AUTO: 0.1 10E3/UL (ref 0–0.2)
BASOPHILS NFR BLD AUTO: 1 %
BILIRUB SERPL-MCNC: 0.5 MG/DL
BUN SERPL-MCNC: 21.8 MG/DL (ref 6–20)
CALCIUM SERPL-MCNC: 9.8 MG/DL (ref 8.6–10)
CHLORIDE SERPL-SCNC: 97 MMOL/L (ref 98–107)
CREAT SERPL-MCNC: 0.82 MG/DL (ref 0.67–1.17)
DEPRECATED HCO3 PLAS-SCNC: 21 MMOL/L (ref 22–29)
EOSINOPHIL # BLD AUTO: 0.2 10E3/UL (ref 0–0.7)
EOSINOPHIL NFR BLD AUTO: 2 %
ERYTHROCYTE [DISTWIDTH] IN BLOOD BY AUTOMATED COUNT: 13.3 % (ref 10–15)
GFR SERPL CREATININE-BSD FRML MDRD: >90 ML/MIN/1.73M2
GLUCOSE BLDC GLUCOMTR-MCNC: 287 MG/DL (ref 70–99)
GLUCOSE SERPL-MCNC: 385 MG/DL (ref 70–99)
HCT VFR BLD AUTO: 41.9 % (ref 40–53)
HGB BLD-MCNC: 14.2 G/DL (ref 13.3–17.7)
IMM GRANULOCYTES # BLD: 0.1 10E3/UL
IMM GRANULOCYTES NFR BLD: 1 %
INR PPP: 1 (ref 0.85–1.15)
LACTATE SERPL-SCNC: 1.3 MMOL/L (ref 0.7–2)
LIPASE SERPL-CCNC: 50 U/L (ref 13–60)
LYMPHOCYTES # BLD AUTO: 2.1 10E3/UL (ref 0.8–5.3)
LYMPHOCYTES NFR BLD AUTO: 22 %
MCH RBC QN AUTO: 27.7 PG (ref 26.5–33)
MCHC RBC AUTO-ENTMCNC: 33.9 G/DL (ref 31.5–36.5)
MCV RBC AUTO: 82 FL (ref 78–100)
MONOCYTES # BLD AUTO: 0.6 10E3/UL (ref 0–1.3)
MONOCYTES NFR BLD AUTO: 6 %
NEUTROPHILS # BLD AUTO: 6.8 10E3/UL (ref 1.6–8.3)
NEUTROPHILS NFR BLD AUTO: 68 %
NRBC # BLD AUTO: 0 10E3/UL
NRBC BLD AUTO-RTO: 0 /100
PLATELET # BLD AUTO: 474 10E3/UL (ref 150–450)
POTASSIUM SERPL-SCNC: 4.4 MMOL/L (ref 3.4–5.3)
PROT SERPL-MCNC: 8.2 G/DL (ref 6.4–8.3)
RBC # BLD AUTO: 5.12 10E6/UL (ref 4.4–5.9)
SODIUM SERPL-SCNC: 132 MMOL/L (ref 136–145)
WBC # BLD AUTO: 9.8 10E3/UL (ref 4–11)

## 2023-03-28 PROCEDURE — 36415 COLL VENOUS BLD VENIPUNCTURE: CPT | Performed by: EMERGENCY MEDICINE

## 2023-03-28 PROCEDURE — 80053 COMPREHEN METABOLIC PANEL: CPT | Performed by: EMERGENCY MEDICINE

## 2023-03-28 PROCEDURE — 83690 ASSAY OF LIPASE: CPT | Performed by: EMERGENCY MEDICINE

## 2023-03-28 PROCEDURE — 120N000002 HC R&B MED SURG/OB UMMC

## 2023-03-28 PROCEDURE — 250N000013 HC RX MED GY IP 250 OP 250 PS 637: Performed by: EMERGENCY MEDICINE

## 2023-03-28 PROCEDURE — 250N000011 HC RX IP 250 OP 636: Performed by: EMERGENCY MEDICINE

## 2023-03-28 PROCEDURE — 258N000003 HC RX IP 258 OP 636: Performed by: EMERGENCY MEDICINE

## 2023-03-28 PROCEDURE — 74177 CT ABD & PELVIS W/CONTRAST: CPT

## 2023-03-28 PROCEDURE — 82962 GLUCOSE BLOOD TEST: CPT

## 2023-03-28 PROCEDURE — 96374 THER/PROPH/DIAG INJ IV PUSH: CPT | Mod: 59 | Performed by: EMERGENCY MEDICINE

## 2023-03-28 PROCEDURE — 85610 PROTHROMBIN TIME: CPT | Performed by: EMERGENCY MEDICINE

## 2023-03-28 PROCEDURE — 85025 COMPLETE CBC W/AUTO DIFF WBC: CPT | Performed by: EMERGENCY MEDICINE

## 2023-03-28 PROCEDURE — 250N000011 HC RX IP 250 OP 636

## 2023-03-28 PROCEDURE — 99285 EMERGENCY DEPT VISIT HI MDM: CPT | Performed by: EMERGENCY MEDICINE

## 2023-03-28 PROCEDURE — 0D9670Z DRAINAGE OF STOMACH WITH DRAINAGE DEVICE, VIA NATURAL OR ARTIFICIAL OPENING: ICD-10-PCS | Performed by: SURGERY

## 2023-03-28 PROCEDURE — 83605 ASSAY OF LACTIC ACID: CPT | Performed by: EMERGENCY MEDICINE

## 2023-03-28 PROCEDURE — 99285 EMERGENCY DEPT VISIT HI MDM: CPT | Mod: 25 | Performed by: EMERGENCY MEDICINE

## 2023-03-28 PROCEDURE — 74177 CT ABD & PELVIS W/CONTRAST: CPT | Mod: 26 | Performed by: STUDENT IN AN ORGANIZED HEALTH CARE EDUCATION/TRAINING PROGRAM

## 2023-03-28 PROCEDURE — 85730 THROMBOPLASTIN TIME PARTIAL: CPT | Performed by: EMERGENCY MEDICINE

## 2023-03-28 PROCEDURE — 96361 HYDRATE IV INFUSION ADD-ON: CPT | Performed by: EMERGENCY MEDICINE

## 2023-03-28 RX ORDER — ACETAMINOPHEN 500 MG
1000 TABLET ORAL ONCE
Status: DISCONTINUED | OUTPATIENT
Start: 2023-03-28 | End: 2023-03-28

## 2023-03-28 RX ORDER — IOPAMIDOL 755 MG/ML
80 INJECTION, SOLUTION INTRAVASCULAR ONCE
Status: COMPLETED | OUTPATIENT
Start: 2023-03-28 | End: 2023-03-28

## 2023-03-28 RX ORDER — ACETAMINOPHEN 325 MG/1
975 TABLET ORAL EVERY 8 HOURS
Status: DISCONTINUED | OUTPATIENT
Start: 2023-03-29 | End: 2023-03-31 | Stop reason: HOSPADM

## 2023-03-28 RX ORDER — ONDANSETRON 2 MG/ML
4 INJECTION INTRAMUSCULAR; INTRAVENOUS EVERY 6 HOURS PRN
Status: DISCONTINUED | OUTPATIENT
Start: 2023-03-28 | End: 2023-03-31 | Stop reason: HOSPADM

## 2023-03-28 RX ORDER — AMOXICILLIN 250 MG
1 CAPSULE ORAL 2 TIMES DAILY PRN
Status: DISCONTINUED | OUTPATIENT
Start: 2023-03-28 | End: 2023-03-31 | Stop reason: HOSPADM

## 2023-03-28 RX ORDER — ONDANSETRON 2 MG/ML
4 INJECTION INTRAMUSCULAR; INTRAVENOUS EVERY 30 MIN PRN
Status: DISCONTINUED | OUTPATIENT
Start: 2023-03-28 | End: 2023-03-29

## 2023-03-28 RX ORDER — SODIUM CHLORIDE, SODIUM LACTATE, POTASSIUM CHLORIDE, CALCIUM CHLORIDE 600; 310; 30; 20 MG/100ML; MG/100ML; MG/100ML; MG/100ML
INJECTION, SOLUTION INTRAVENOUS CONTINUOUS
Status: DISCONTINUED | OUTPATIENT
Start: 2023-03-28 | End: 2023-03-31 | Stop reason: HOSPADM

## 2023-03-28 RX ORDER — SODIUM CHLORIDE 9 MG/ML
INJECTION, SOLUTION INTRAVENOUS CONTINUOUS
Status: DISCONTINUED | OUTPATIENT
Start: 2023-03-28 | End: 2023-03-29

## 2023-03-28 RX ORDER — IBUPROFEN 600 MG/1
600 TABLET, FILM COATED ORAL ONCE
Status: COMPLETED | OUTPATIENT
Start: 2023-03-28 | End: 2023-03-28

## 2023-03-28 RX ORDER — LIDOCAINE 40 MG/G
CREAM TOPICAL
Status: DISCONTINUED | OUTPATIENT
Start: 2023-03-28 | End: 2023-03-31 | Stop reason: HOSPADM

## 2023-03-28 RX ORDER — AMOXICILLIN 250 MG
2 CAPSULE ORAL 2 TIMES DAILY PRN
Status: DISCONTINUED | OUTPATIENT
Start: 2023-03-28 | End: 2023-03-31 | Stop reason: HOSPADM

## 2023-03-28 RX ORDER — ONDANSETRON 4 MG/1
4 TABLET, ORALLY DISINTEGRATING ORAL EVERY 6 HOURS PRN
Status: DISCONTINUED | OUTPATIENT
Start: 2023-03-28 | End: 2023-03-31 | Stop reason: HOSPADM

## 2023-03-28 RX ADMIN — ONDANSETRON 4 MG: 2 INJECTION INTRAMUSCULAR; INTRAVENOUS at 20:14

## 2023-03-28 RX ADMIN — IOPAMIDOL 80 ML: 755 INJECTION, SOLUTION INTRAVENOUS at 21:12

## 2023-03-28 RX ADMIN — IBUPROFEN 600 MG: 600 TABLET, FILM COATED ORAL at 20:15

## 2023-03-28 RX ADMIN — SODIUM CHLORIDE 1000 ML: 9 INJECTION, SOLUTION INTRAVENOUS at 20:14

## 2023-03-28 ASSESSMENT — ACTIVITIES OF DAILY LIVING (ADL)
ADLS_ACUITY_SCORE: 37
ADLS_ACUITY_SCORE: 37

## 2023-03-28 ASSESSMENT — ENCOUNTER SYMPTOMS: SEIZURES: 1

## 2023-03-29 ENCOUNTER — APPOINTMENT (OUTPATIENT)
Dept: GENERAL RADIOLOGY | Facility: CLINIC | Age: 58
End: 2023-03-29
Payer: MEDICAID

## 2023-03-29 LAB
ALBUMIN UR-MCNC: NEGATIVE MG/DL
ANION GAP SERPL CALCULATED.3IONS-SCNC: 10 MMOL/L (ref 7–15)
APPEARANCE UR: CLEAR
BASOPHILS # BLD AUTO: 0 10E3/UL (ref 0–0.2)
BASOPHILS NFR BLD AUTO: 1 %
BILIRUB UR QL STRIP: NEGATIVE
BUN SERPL-MCNC: 18 MG/DL (ref 6–20)
CALCIUM SERPL-MCNC: 9 MG/DL (ref 8.6–10)
CHLORIDE SERPL-SCNC: 102 MMOL/L (ref 98–107)
COLOR UR AUTO: ABNORMAL
CREAT SERPL-MCNC: 0.64 MG/DL (ref 0.67–1.17)
DEPRECATED HCO3 PLAS-SCNC: 23 MMOL/L (ref 22–29)
EOSINOPHIL # BLD AUTO: 0.2 10E3/UL (ref 0–0.7)
EOSINOPHIL NFR BLD AUTO: 3 %
ERYTHROCYTE [DISTWIDTH] IN BLOOD BY AUTOMATED COUNT: 13.3 % (ref 10–15)
GFR SERPL CREATININE-BSD FRML MDRD: >90 ML/MIN/1.73M2
GLUCOSE BLDC GLUCOMTR-MCNC: 221 MG/DL (ref 70–99)
GLUCOSE SERPL-MCNC: 288 MG/DL (ref 70–99)
GLUCOSE UR STRIP-MCNC: >=1000 MG/DL
HCT VFR BLD AUTO: 37.8 % (ref 40–53)
HGB BLD-MCNC: 12.6 G/DL (ref 13.3–17.7)
HGB UR QL STRIP: NEGATIVE
IMM GRANULOCYTES # BLD: 0 10E3/UL
IMM GRANULOCYTES NFR BLD: 1 %
KETONES UR STRIP-MCNC: 10 MG/DL
LACTATE SERPL-SCNC: 1 MMOL/L (ref 0.7–2)
LACTATE SERPL-SCNC: 1.3 MMOL/L (ref 0.7–2)
LEUKOCYTE ESTERASE UR QL STRIP: NEGATIVE
LYMPHOCYTES # BLD AUTO: 1.8 10E3/UL (ref 0.8–5.3)
LYMPHOCYTES NFR BLD AUTO: 28 %
MCH RBC QN AUTO: 27.9 PG (ref 26.5–33)
MCHC RBC AUTO-ENTMCNC: 33.3 G/DL (ref 31.5–36.5)
MCV RBC AUTO: 84 FL (ref 78–100)
MONOCYTES # BLD AUTO: 0.6 10E3/UL (ref 0–1.3)
MONOCYTES NFR BLD AUTO: 9 %
NEUTROPHILS # BLD AUTO: 4 10E3/UL (ref 1.6–8.3)
NEUTROPHILS NFR BLD AUTO: 58 %
NITRATE UR QL: NEGATIVE
NRBC # BLD AUTO: 0 10E3/UL
NRBC BLD AUTO-RTO: 0 /100
PH UR STRIP: 7 [PH] (ref 5–7)
PLATELET # BLD AUTO: 380 10E3/UL (ref 150–450)
POTASSIUM SERPL-SCNC: 4 MMOL/L (ref 3.4–5.3)
RADIOLOGIST FLAGS: ABNORMAL
RBC # BLD AUTO: 4.51 10E6/UL (ref 4.4–5.9)
RBC URINE: <1 /HPF
SODIUM SERPL-SCNC: 135 MMOL/L (ref 136–145)
SP GR UR STRIP: 1.03 (ref 1–1.03)
UROBILINOGEN UR STRIP-MCNC: NORMAL MG/DL
WBC # BLD AUTO: 6.7 10E3/UL (ref 4–11)
WBC URINE: <1 /HPF

## 2023-03-29 PROCEDURE — 250N000011 HC RX IP 250 OP 636: Performed by: STUDENT IN AN ORGANIZED HEALTH CARE EDUCATION/TRAINING PROGRAM

## 2023-03-29 PROCEDURE — 36415 COLL VENOUS BLD VENIPUNCTURE: CPT | Performed by: STUDENT IN AN ORGANIZED HEALTH CARE EDUCATION/TRAINING PROGRAM

## 2023-03-29 PROCEDURE — 83605 ASSAY OF LACTIC ACID: CPT | Performed by: STUDENT IN AN ORGANIZED HEALTH CARE EDUCATION/TRAINING PROGRAM

## 2023-03-29 PROCEDURE — 120N000002 HC R&B MED SURG/OB UMMC

## 2023-03-29 PROCEDURE — 258N000003 HC RX IP 258 OP 636: Performed by: STUDENT IN AN ORGANIZED HEALTH CARE EDUCATION/TRAINING PROGRAM

## 2023-03-29 PROCEDURE — 250N000013 HC RX MED GY IP 250 OP 250 PS 637: Performed by: STUDENT IN AN ORGANIZED HEALTH CARE EDUCATION/TRAINING PROGRAM

## 2023-03-29 PROCEDURE — 999N000065 XR ABDOMEN PORT 1 VIEW

## 2023-03-29 PROCEDURE — 82962 GLUCOSE BLOOD TEST: CPT

## 2023-03-29 PROCEDURE — 250N000013 HC RX MED GY IP 250 OP 250 PS 637

## 2023-03-29 PROCEDURE — 81001 URINALYSIS AUTO W/SCOPE: CPT | Performed by: EMERGENCY MEDICINE

## 2023-03-29 PROCEDURE — 85025 COMPLETE CBC W/AUTO DIFF WBC: CPT | Performed by: STUDENT IN AN ORGANIZED HEALTH CARE EDUCATION/TRAINING PROGRAM

## 2023-03-29 PROCEDURE — 80048 BASIC METABOLIC PNL TOTAL CA: CPT | Performed by: STUDENT IN AN ORGANIZED HEALTH CARE EDUCATION/TRAINING PROGRAM

## 2023-03-29 PROCEDURE — 74018 RADEX ABDOMEN 1 VIEW: CPT | Mod: 26 | Performed by: RADIOLOGY

## 2023-03-29 RX ORDER — GLIMEPIRIDE 4 MG/1
4 TABLET ORAL
Status: DISCONTINUED | OUTPATIENT
Start: 2023-03-29 | End: 2023-03-31 | Stop reason: HOSPADM

## 2023-03-29 RX ORDER — NALOXONE HYDROCHLORIDE 0.4 MG/ML
0.2 INJECTION, SOLUTION INTRAMUSCULAR; INTRAVENOUS; SUBCUTANEOUS
Status: DISCONTINUED | OUTPATIENT
Start: 2023-03-29 | End: 2023-03-31 | Stop reason: HOSPADM

## 2023-03-29 RX ORDER — NICOTINE POLACRILEX 4 MG
15-30 LOZENGE BUCCAL
Status: DISCONTINUED | OUTPATIENT
Start: 2023-03-29 | End: 2023-03-31 | Stop reason: HOSPADM

## 2023-03-29 RX ORDER — METFORMIN HCL 500 MG
2000 TABLET, EXTENDED RELEASE 24 HR ORAL
Status: DISCONTINUED | OUTPATIENT
Start: 2023-03-29 | End: 2023-03-31 | Stop reason: HOSPADM

## 2023-03-29 RX ORDER — NALOXONE HYDROCHLORIDE 0.4 MG/ML
0.4 INJECTION, SOLUTION INTRAMUSCULAR; INTRAVENOUS; SUBCUTANEOUS
Status: DISCONTINUED | OUTPATIENT
Start: 2023-03-29 | End: 2023-03-31 | Stop reason: HOSPADM

## 2023-03-29 RX ORDER — FAMOTIDINE 20 MG/1
40 TABLET, FILM COATED ORAL DAILY
Status: DISCONTINUED | OUTPATIENT
Start: 2023-03-29 | End: 2023-03-31 | Stop reason: HOSPADM

## 2023-03-29 RX ORDER — DEXTROSE MONOHYDRATE 25 G/50ML
25-50 INJECTION, SOLUTION INTRAVENOUS
Status: DISCONTINUED | OUTPATIENT
Start: 2023-03-29 | End: 2023-03-31 | Stop reason: HOSPADM

## 2023-03-29 RX ORDER — HYDROMORPHONE HCL IN WATER/PF 6 MG/30 ML
0.2 PATIENT CONTROLLED ANALGESIA SYRINGE INTRAVENOUS
Status: DISCONTINUED | OUTPATIENT
Start: 2023-03-29 | End: 2023-03-30

## 2023-03-29 RX ORDER — KETOROLAC TROMETHAMINE 15 MG/ML
15 INJECTION, SOLUTION INTRAMUSCULAR; INTRAVENOUS EVERY 6 HOURS PRN
Status: DISCONTINUED | OUTPATIENT
Start: 2023-03-29 | End: 2023-03-30

## 2023-03-29 RX ORDER — BUPROPION HYDROCHLORIDE 150 MG/1
150 TABLET ORAL DAILY
Status: DISCONTINUED | OUTPATIENT
Start: 2023-03-29 | End: 2023-03-31 | Stop reason: HOSPADM

## 2023-03-29 RX ADMIN — SITAGLIPTIN 100 MG: 100 TABLET, FILM COATED ORAL at 08:17

## 2023-03-29 RX ADMIN — HYDROMORPHONE HYDROCHLORIDE 0.2 MG: 0.2 INJECTION, SOLUTION INTRAMUSCULAR; INTRAVENOUS; SUBCUTANEOUS at 03:14

## 2023-03-29 RX ADMIN — SODIUM CHLORIDE, POTASSIUM CHLORIDE, SODIUM LACTATE AND CALCIUM CHLORIDE: 600; 310; 30; 20 INJECTION, SOLUTION INTRAVENOUS at 20:27

## 2023-03-29 RX ADMIN — KETOROLAC TROMETHAMINE 15 MG: 15 INJECTION, SOLUTION INTRAMUSCULAR; INTRAVENOUS at 11:41

## 2023-03-29 RX ADMIN — KETOROLAC TROMETHAMINE 15 MG: 15 INJECTION, SOLUTION INTRAMUSCULAR; INTRAVENOUS at 00:29

## 2023-03-29 RX ADMIN — SODIUM CHLORIDE, POTASSIUM CHLORIDE, SODIUM LACTATE AND CALCIUM CHLORIDE: 600; 310; 30; 20 INJECTION, SOLUTION INTRAVENOUS at 00:58

## 2023-03-29 RX ADMIN — SODIUM CHLORIDE, POTASSIUM CHLORIDE, SODIUM LACTATE AND CALCIUM CHLORIDE: 600; 310; 30; 20 INJECTION, SOLUTION INTRAVENOUS at 11:15

## 2023-03-29 RX ADMIN — HYDROMORPHONE HYDROCHLORIDE 0.2 MG: 0.2 INJECTION, SOLUTION INTRAMUSCULAR; INTRAVENOUS; SUBCUTANEOUS at 16:40

## 2023-03-29 RX ADMIN — BICTEGRAVIR SODIUM, EMTRICITABINE, AND TENOFOVIR ALAFENAMIDE FUMARATE 1 TABLET: 50; 200; 25 TABLET ORAL at 10:12

## 2023-03-29 RX ADMIN — FAMOTIDINE 40 MG: 20 TABLET ORAL at 08:16

## 2023-03-29 RX ADMIN — HYDROMORPHONE HYDROCHLORIDE 0.2 MG: 0.2 INJECTION, SOLUTION INTRAMUSCULAR; INTRAVENOUS; SUBCUTANEOUS at 06:57

## 2023-03-29 ASSESSMENT — ACTIVITIES OF DAILY LIVING (ADL)
ADLS_ACUITY_SCORE: 37
ADLS_ACUITY_SCORE: 20
ADLS_ACUITY_SCORE: 37
ADLS_ACUITY_SCORE: 37
DIFFICULTY_EATING/SWALLOWING: NO
ADLS_ACUITY_SCORE: 37
DRESSING/BATHING_DIFFICULTY: NO
ADLS_ACUITY_SCORE: 20
TOILETING_ISSUES: NO
ADLS_ACUITY_SCORE: 37
WEAR_GLASSES_OR_BLIND: NO
ADLS_ACUITY_SCORE: 37
WALKING_OR_CLIMBING_STAIRS_DIFFICULTY: NO
FALL_HISTORY_WITHIN_LAST_SIX_MONTHS: NO
CONCENTRATING,_REMEMBERING_OR_MAKING_DECISIONS_DIFFICULTY: NO
CHANGE_IN_FUNCTIONAL_STATUS_SINCE_ONSET_OF_CURRENT_ILLNESS/INJURY: NO
DOING_ERRANDS_INDEPENDENTLY_DIFFICULTY: NO
ADLS_ACUITY_SCORE: 37

## 2023-03-29 NOTE — CARE PLAN
Pt seen and examined.    Initially called for closed loop, thus posted for OR.   However, radiology retracted their read from closed loop to partial SBO.  Likewise, upon reviewing the images myself, I see that there is air distal to the intestine in question, suggesting partial obstruction as well.  I do also see two potential areas of transition, but this is consistent from previous images, and theres areas that suggest slow tapering vs abrupt changes.   I discussed these findings with the patient and he voiced understanding. He is aox4, castillo, hds, nad, rrr, nlb.   I offered but he deferred .   I offered an operation for tonight/immediately vs admission/decompression with monitoring and possible surgery vs admission/decompression and resolution of obstruction.   I also explained what the general risks are with each decision going forward. He stated that he is not inclined to proceed with a surgery unless he absolutely had to have it, which I don't think he needs it at this point. I informed him that is likely necessary that he have an operation given his frequent bouts of obstruction but he declined for now, stating that he will reconsider in the future again or unless he were to worsen at this point.     Also, I discussed w/ radiology staff regarding this particular case, he stated that his resident read the scan as as definitive closed loop which he does not think is the case, however he does think there is a component of obstruction, but doesn't think those two points are contiguous, therefore doesn't think it is 100% closed loop obstruction. Also, he and I both agree that there is air within the lumen in the distal bowel suggesting some passage of air and that this is not a full obstruction.     To add, pt reports taking HIV meds and being sober/abstinent from drugs for >45 days, his desire to abstain from narcotic meds as much as possible. Likewise, his LA is normal (previous admission he's had higher  LA upon presentation) and he is not exhibiting any signs of distress. He is sitting in his chair comfortably and main complaint is nausea. His current CT scan is less concerning than the one on his prior admission. He is not passing flatus at this time. Abdomen is non distended. Mild TTP LUQ, non peritonitic, no diffuse tenderness.     I recommended to the pt to get NGT for decompression and serial labs and abdominal exams, he voiced understanding and wished for us to proceed with stated plan.     Miky Chatterjee MD

## 2023-03-29 NOTE — ED PROVIDER NOTES
History     Chief Complaint   Patient presents with     Nausea, Vomiting, & Diarrhea     HPI  Andrew Collier is a 55 year old male with a past medical history of toxoplasma encephalitis, HIV, pulmonary nodules, small bowel obstruction, GERD, type 2 diabetes, constipation, anxiety/depression, prior appendectomy, methamphetamine abuse, gastric outlet obstruction, homelessness who presents to the emergency department with a chief complaint of abdominal pain.  Located in the left side of his abdomen.  Associate with nausea and vomiting.  Symptoms started this afternoon.  The patient feels that his current symptoms are similar to prior small bowel obstructions.  The patient states he did have a bowel movement yesterday (diarrhea).  Patient noted to be actively vomiting on arrival to the emergency department.  No associated fevers.    Per chart review, it appears that the patient was admitted last month (2/15/2023) for sepsis due to left wrist cellulitis.  Prior to that, he was admitted to internal medicine with general surgery consultation for a partial small bowel obstruction on 2/6/2023.    No other abdominal surgeries other than appendectomy.     He states he has not been checking his blood sugars recently  As he lost his glucometer and his insurance will not cover another one.     I have reviewed the Medications, Allergies, Past Medical and Surgical History, and Social History in the Epic system.    CT CAP 2/6/23                                                               IMPRESSION:  1.  Dilated jejunal loop with transition to normal caliber small bowel in the right lower quadrant, suspicious for partial small bowel obstruction, possibly due to adhesion or stricture. No pneumatosis.     2.  No acute findings in the chest.    CT abdomen/pelvis 1/24/23                                                               IMPRESSION:  1. Multiple fluid-filled loops of small bowel some of which have mild  wall  thickening without dilatation, which can be seen in enteritis.  2. Incidental and chronic findings as described above.    CT abdomen/pelvis 12/23/23                                                                 IMPRESSION:   1.  Dilatation of the jejunal loop in left midabdomen extending over to an area of a narrowing involving the small bowel in the right mid abdomen. Findings most compatible with partial obstruction given that there remains some air and fluid within the   colon and distal small bowel. No focal mass or definitive evidence for abnormality at the area of narrowing/tapering in the right mid abdomen. Continued follow-up recommended.     2.  Mild wall thickening into the dilated loop of jejunum which is slightly decreased since the prior study likely indicating persistent changes of enteritis.     3.  Marked gastric distention with fluid.     4.  Cholelithiasis.     5.  Hepatic steatosis.    Past Medical History:   Diagnosis Date     Acute appendicitis with localized peritonitis 1/31/2018     AIDS (H)      Allergic rhinitis due to other allergen     DNS     Anal dysplasia      Chronic abdominal pain      CNS toxoplasmosis (H)      COVID-19 1/11/2022     Diabetes type 2, controlled (H)      GERD (gastroesophageal reflux disease)      Herpes zoster 9/23/2016     History of seizure      History of substance abuse (H)      HIV (human immunodeficiency virus infection) (H)      HLD (hyperlipidemia)      Lung nodules      Periungual wart      Pneumonia 1/6/2019     PTSD (post-traumatic stress disorder)      Sleep apnea     doesn't use CPAP     Past Surgical History:   Procedure Laterality Date     ANOSCOPY N/A 9/9/2020    Procedure: Exam Under Anesthesia, ANOSCOPY, fulgeration of rectal fissures with Rectal Biopsies;  Surgeon: Thanh Lundberg MD;  [facility]     COLONOSCOPY Left 1/22/2016    Procedure: COMBINED COLONOSCOPY, SINGLE OR MULTIPLE BIOPSY/POLYPECTOMY BY BIOPSY;  Surgeon: Clark Saini,  MD;  Location: [facility]     ESOPHAGOSCOPY, GASTROSCOPY, DUODENOSCOPY (EGD), COMBINED N/A 6/6/2022    Procedure: ESOPHAGOGASTRODUODENOSCOPY, WITH BIOPSY;  Surgeon: Kecia Benítez MD;  Location: [facility]     HC EXPLORE UNDESC TESTIS,INGUIN/SCROTAL       LAPAROSCOPIC APPENDECTOMY N/A 1/31/2018    Procedure: LAPAROSCOPIC APPENDECTOMY;  LAPAROSCOPIC APPENDECTOMY;  Surgeon: Dawn Holt MD;  [facility]     LAPAROSCOPY DIAGNOSTIC (GENERAL) N/A 7/26/2016    Procedure: LAPAROSCOPY DIAGNOSTIC (GENERAL);  Surgeon: Susannah Arriaga MD;  [facility]     LAPAROSCOPY DIAGNOSTIC (GENERAL) N/A 4/16/2018    Procedure: LAPAROSCOPY DIAGNOSTIC (GENERAL);  Diagnostic laparoscopy and lysis of adhesions;  Surgeon: Prince Dowling MD;  [facility]: [facility]     OPTICAL TRACKING SYSTEM CRANIOTOMY, EXCISE TUMOR, COMBINED Left 4/10/2015    Procedure: COMBINED OPTICAL TRACKING SYSTEM CRANIOTOMY, EXCISE TUMOR;  Surgeon: Mirlande Colmenares MD;  Location: [facility]     REPAIR GAMEKEEPER'S THUMB Right 12/2/2016    Procedure: REPAIR LIGAMENT ULNAR COLLATERAL THUMB (GAMEKEEPER'S);  Surgeon: Evin Zamorano MD;  [facility]     ZZC NONSPECIFIC PROCEDURE      right forearm fracture     No current facility-administered medications for this encounter.     Current Outpatient Medications   Medication     acetaminophen (TYLENOL) 500 MG tablet     bictegravir-emtricitabine-tenofovir (BIKTARVY) -25 MG per tablet     blood glucose (NO BRAND SPECIFIED) lancets standard     blood glucose (NO BRAND SPECIFIED) test strip     blood glucose monitoring (NO BRAND SPECIFIED) meter device kit     buPROPion (WELLBUTRIN XL) 150 MG 24 hr tablet     famotidine (PEPCID) 40 MG tablet     glimepiride (AMARYL) 4 MG tablet     ibuprofen (ADVIL/MOTRIN) 600 MG tablet     levofloxacin (LEVAQUIN) 500 MG tablet     metFORMIN (FORTAMET) 1000 MG 24 hr tablet     sitagliptin (JANUVIA) 100 MG tablet     sulfamethoxazole-trimethoprim (BACTRIM DS)  "800-160 MG tablet     Allergies   Allergen Reactions     Fentanyl Blisters     Per pt, after taking this medication had blisters develope     Tylenol [Acetaminophen] Itching     Dulaglutide Rash     Insulin Lispro Rash     Patient reported     Penicillin V Other (See Comments) and Rash     Diffuse maculopapular rash + feels \"high\", per pt.      Past medical history, past surgical history, medications, and allergies were reviewed with the patient. Additional pertinent items: None    Social History     Socioeconomic History     Marital status: Single     Spouse name: Not on file     Number of children: Not on file     Years of education: Not on file     Highest education level: Not on file   Occupational History     Occupation:      Comment: 5 years; temp agency     Occupation: Unemployed   Tobacco Use     Smoking status: Some Days     Packs/day: 0.25     Years: 40.00     Pack years: 10.00     Types: Cigarettes     Smokeless tobacco: Former   Substance and Sexual Activity     Alcohol use: No     Alcohol/week: 0.0 standard drinks     Comment: Last etoh in 2007     Drug use: Not Currently     Types: Marijuana, Methamphetamines     Sexual activity: Not Currently     Partners: Female, Male     Comment: Last sexual activity 2008   Other Topics Concern     Parent/sibling w/ CABG, MI or angioplasty before 65F 55M? Not Asked   Social History Narrative    Born in [facility].  Came to the [place] in 1993.  Last traveled to visit family in 2008.        1/7/2019 - Homeless. Working with a  at [employer]  trying to get housing. Sexually active with two partners recently using condoms 100% of the time. Smokes occasionally, not for the last 4 weeks.        1/7/2020 at [facility] since 1/1/2020. 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  Care established with ID and endocrine     Social Determinants of Health     Financial Resource Strain: Not on file   Food Insecurity: Not on file   Transportation Needs: Not on file "   Physical Activity: Not on file   Stress: Not on file   Social Connections: Not on file   Intimate Partner Violence: Not on file   Housing Stability: Not on file     Social history was reviewed with the patient. Additional pertinent items: None    Review of Systems  A medically appropriate review of systems was performed with pertinent positives and negatives noted in the HPI, and all other systems negative.    Physical Exam   BP: (!) 144/78  Pulse: 90  Temp: 97  F (36.1  C)  Resp: 16  SpO2: 98 %      General: Well nourished, well developed, in mild distress secondary to pain  HEENT: EOMI, anicteric. NCAT, MMM  Neck: no jugular venous distension, supple, nl ROM  Cardiac: Regular rate, extremities well-perfused  Pulm: Nonlabored breathing, normal respiratory rate, airway patent  Abd: Soft, TTP LUQ of abdomen without rebound or guarding, nondistended.  No masses palpated.    Skin: Warm and dry to the touch.  No rash  Extremities: No LE edema, no cyanosis, w/w/p  Neuro: A&Ox3, no gross focal deficits    ED Course        Procedures                           Labs Ordered and Resulted from Time of ED Arrival to Time of ED Departure   COMPREHENSIVE METABOLIC PANEL - Abnormal       Result Value    Sodium 132 (*)     Potassium 4.4      Chloride 97 (*)     Carbon Dioxide (CO2) 21 (*)     Anion Gap 14      Urea Nitrogen 21.8 (*)     Creatinine 0.82      Calcium 9.8      Glucose 385 (*)     Alkaline Phosphatase 134 (*)     AST 20      ALT 14      Protein Total 8.2      Albumin 4.4      Bilirubin Total 0.5      GFR Estimate >90     CBC WITH PLATELETS AND DIFFERENTIAL - Abnormal    WBC Count 9.8      RBC Count 5.12      Hemoglobin 14.2      Hematocrit 41.9      MCV 82      MCH 27.7      MCHC 33.9      RDW 13.3      Platelet Count 474 (*)     % Neutrophils 68      % Lymphocytes 22      % Monocytes 6      % Eosinophils 2      % Basophils 1      % Immature Granulocytes 1      NRBCs per 100 WBC 0      Absolute Neutrophils 6.8       Absolute Lymphocytes 2.1      Absolute Monocytes 0.6      Absolute Eosinophils 0.2      Absolute Basophils 0.1      Absolute Immature Granulocytes 0.1      Absolute NRBCs 0.0     GLUCOSE BY METER - Abnormal    GLUCOSE BY METER POCT 287 (*)    LIPASE - Normal    Lipase 50     LACTIC ACID WHOLE BLOOD - Normal    Lactic Acid 1.3     ROUTINE UA WITH MICROSCOPIC REFLEX TO CULTURE   GLUCOSE MONITOR NURSING POCT   INR   PARTIAL THROMBOPLASTIN TIME            Results for orders placed or performed during the hospital encounter of 03/28/23 (from the past 24 hour(s))   CBC with platelets differential    Narrative    The following orders were created for panel order CBC with platelets differential.  Procedure                               Abnormality         Status                     ---------                               -----------         ------                     CBC with platelets and d...[[id number]]  Abnormal            Final result                 Please view results for these tests on the individual orders.   Comprehensive metabolic panel   Result Value Ref Range    Sodium 132 (L) 136 - 145 mmol/L    Potassium 4.4 3.4 - 5.3 mmol/L    Chloride 97 (L) 98 - 107 mmol/L    Carbon Dioxide (CO2) 21 (L) 22 - 29 mmol/L    Anion Gap 14 7 - 15 mmol/L    Urea Nitrogen 21.8 (H) 6.0 - 20.0 mg/dL    Creatinine 0.82 0.67 - 1.17 mg/dL    Calcium 9.8 8.6 - 10.0 mg/dL    Glucose 385 (H) 70 - 99 mg/dL    Alkaline Phosphatase 134 (H) 40 - 129 U/L    AST 20 10 - 50 U/L    ALT 14 10 - 50 U/L    Protein Total 8.2 6.4 - 8.3 g/dL    Albumin 4.4 3.5 - 5.2 g/dL    Bilirubin Total 0.5 <=1.2 mg/dL    GFR Estimate >90 >60 mL/min/1.73m2   Lipase   Result Value Ref Range    Lipase 50 13 - 60 U/L   CBC with platelets and differential   Result Value Ref Range    WBC Count 9.8 4.0 - 11.0 10e3/uL    RBC Count 5.12 4.40 - 5.90 10e6/uL    Hemoglobin 14.2 13.3 - 17.7 g/dL    Hematocrit 41.9 40.0 - 53.0 %    MCV 82 78 - 100 fL    MCH 27.7 26.5 - 33.0 pg     MCHC 33.9 31.5 - 36.5 g/dL    RDW 13.3 10.0 - 15.0 %    Platelet Count 474 (H) 150 - 450 10e3/uL    % Neutrophils 68 %    % Lymphocytes 22 %    % Monocytes 6 %    % Eosinophils 2 %    % Basophils 1 %    % Immature Granulocytes 1 %    NRBCs per 100 WBC 0 <1 /100    Absolute Neutrophils 6.8 1.6 - 8.3 10e3/uL    Absolute Lymphocytes 2.1 0.8 - 5.3 10e3/uL    Absolute Monocytes 0.6 0.0 - 1.3 10e3/uL    Absolute Eosinophils 0.2 0.0 - 0.7 10e3/uL    Absolute Basophils 0.1 0.0 - 0.2 10e3/uL    Absolute Immature Granulocytes 0.1 <=0.4 10e3/uL    Absolute NRBCs 0.0 10e3/uL   CT Abdomen Pelvis w Contrast    Impression    RESIDENT PRELIMINARY INTERPRETATION  IMPRESSION:   1. Single loop of dilated small bowel with 2 transition points  concerning for closed loop small bowel obstruction.  2. No additional acute intra-abdominal findings.    [Urgent Result: Findings concerning for closed loop SBO]    Finding was identified on 3/28/2023 9:24 PM.     Dr. Brown was contacted by Dr. Daryn Lilly at 3/28/2023 9:25 PM  and verbalized understanding of the urgent finding.    Glucose by meter   Result Value Ref Range    GLUCOSE BY METER POCT 287 (H) 70 - 99 mg/dL   Lactic acid whole blood   Result Value Ref Range    Lactic Acid 1.3 0.7 - 2.0 mmol/L     *Note: Due to a large number of results and/or encounters for the requested time period, some results have not been displayed. A complete set of results can be found in Results Review.       Labs, vital signs, and imaging studies were reviewed by me.    Medications   ondansetron (ZOFRAN) injection 4 mg (4 mg Intravenous $Given 3/28/23 2014)   0.9% sodium chloride BOLUS (1,000 mLs Intravenous $New Bag 3/28/23 2014)     Followed by   sodium chloride 0.9% infusion (has no administration in time range)   ibuprofen (ADVIL/MOTRIN) tablet 600 mg (600 mg Oral $Given 3/28/23 2015)   sodium chloride (PF) 0.9% PF flush 80 mL (80 mLs Intravenous $Given 3/28/23 2113)   iopamidol (ISOVUE-370) solution  80 mL (80 mLs Intravenous $Given 3/28/23 2112)       Assessments & Plan (with Medical Decision Making)   Andrew Collier is a 55 year old male who presents to the emergency department with abdominal pain.  Differential diagnosis includes small bowel obstruction, constipation, diverticulitis, pancreatitis, gastritis, peptic ulcer disease, GERD, nephrolithiasis, UTI, DKA, gastroparesis, musculoskeletal abdominal pain.  Labs, urinalysis, CT of the abdomen pelvis ordered to further evaluate the patient.  IV fluids and Zofran were ordered for symptomatic relief in the emergency department.    Laboratory work-up is remarkable for normal white blood cell count, normal lipase, elevated glucose (we will recheck this after fluids and order insulin if necessary).    CT shows small bowel obstruction. Appears c/w closed-loop obstruction. This was d/w radiology at 2128. General surgery was paged.    Pt d/w surgery, they will come evaluate the patient. They recommend adding lactate and coag testing onto labs.    Lactate is wnl.    Critical care was not performed.     Medical Decision Making  The patient's presentation was of moderate complexity (a chronic illness mild to moderate exacerbation, progression, or side effect of treatment).    The patient's evaluation involved:  review of external note(s) from 1 sources (notes from recent hospitalizations)  ordering and/or review of 3+ test(s) in this encounter (see separate area of note for details)  review of 3+ test result(s) ordered prior to this encounter (see separate area of note for details)  independent interpretation of testing performed by another health professional (CT)  discussion of management or test interpretation with another health professional (see separate area of note for details)    The patient's management necessitated moderate risk (prescription drug management including medications given in the ED) and high risk (a decision regarding  hospitalization).      I have reviewed the nursing notes.    I have reviewed the findings, diagnosis, plan and need for follow up with the patient.    Patient to be admitted for further management. Plan was discussed with patient who understands and agrees with plan.    New Prescriptions    No medications on file       Final diagnoses:   Left sided abdominal pain   Nausea and vomiting, unspecified vomiting type   Small bowel obstruction (H)       Maranda Brown MD  3/28/2023   [facility] EMERGENCY DEPARTMENT     Maranda Brown MD  03/28/23 0035

## 2023-03-29 NOTE — PROGRESS NOTES
Shift:  [id number]    VS:  VSS  Pain:  Pt reports 10/10 abdominal pain. IV toradol and IV dilaudid given with some relief. Currently complaining of headache due to hunger. MD notified.  Neuro:  A/Ox4  Cardiac:  WDL  Respiratory:  WDL  Diet/Appetite:  NPO, NGT to LIS. Pt feeling very hungry. No nausea reported.  GI/:  Abdominal pain. No BM this shift. Voiding spontaneously.  LDAs:  PIV infusing.  Skin:  WDL  Activity:  UAL independently in room.  Test/Procedures:  N/A  Labs:  . Glucose urine >=1000.

## 2023-03-29 NOTE — PROGRESS NOTES
Brief surgery note    Reexamined patient, abdominal pain unchanged, minimal tenderness to palpation in the suprapubic region.  NG tube is in place.  Patient feels about the same as prior to placement.  He did refuse labs in the morning, reiterated the importance of obtaining these labs.  He is amenable to now      Evin Orosco

## 2023-03-29 NOTE — ANESTHESIA PREPROCEDURE EVALUATION
Anesthesia Pre-Procedure Evaluation    Patient: Andrew Collier   MRN: [id number] : 1967        Procedure : Procedure(s):  Laparotomy exploratory          Past Medical History:   Diagnosis Date     Acute appendicitis with localized peritonitis 2018     AIDS (H)      Allergic rhinitis due to other allergen     DNS     Anal dysplasia      Chronic abdominal pain      CNS toxoplasmosis (H)      COVID-19 2022     Diabetes type 2, controlled (H)      GERD (gastroesophageal reflux disease)      Herpes zoster 2016     History of seizure      History of substance abuse (H)      HIV (human immunodeficiency virus infection) (H)      HLD (hyperlipidemia)      Lung nodules      Periungual wart      Pneumonia 2019     PTSD (post-traumatic stress disorder)      Sleep apnea     doesn't use CPAP      Past Surgical History:   Procedure Laterality Date     ANOSCOPY N/A 2020    Procedure: Exam Under Anesthesia, ANOSCOPY, fulgeration of rectal fissures with Rectal Biopsies;  Surgeon: Thanh Lundberg MD;  [facility]     COLONOSCOPY Left 2016    Procedure: COMBINED COLONOSCOPY, SINGLE OR MULTIPLE BIOPSY/POLYPECTOMY BY BIOPSY;  Surgeon: Clark Saini MD;  Location: [facility]     ESOPHAGOSCOPY, GASTROSCOPY, DUODENOSCOPY (EGD), COMBINED N/A 2022    Procedure: ESOPHAGOGASTRODUODENOSCOPY, WITH BIOPSY;  Surgeon: Kecia Benítez MD;  Location: [facility]     HC EXPLORE UNDESC TESTIS,INGUIN/SCROTAL       LAPAROSCOPIC APPENDECTOMY N/A 2018    Procedure: LAPAROSCOPIC APPENDECTOMY;  LAPAROSCOPIC APPENDECTOMY;  Surgeon: Dawn Holt MD;  [facility]     LAPAROSCOPY DIAGNOSTIC (GENERAL) N/A 2016    Procedure: LAPAROSCOPY DIAGNOSTIC (GENERAL);  Surgeon: Susannah Arriaga MD;  [facility]     LAPAROSCOPY DIAGNOSTIC (GENERAL) N/A 2018    Procedure: LAPAROSCOPY DIAGNOSTIC (GENERAL);  Diagnostic laparoscopy and lysis of adhesions;  Surgeon: Prince Dowling,  "MD;  Location: [facility]     OPTICAL TRACKING SYSTEM CRANIOTOMY, EXCISE TUMOR, COMBINED Left 4/10/2015    Procedure: COMBINED OPTICAL TRACKING SYSTEM CRANIOTOMY, EXCISE TUMOR;  Surgeon: Mirlande Colmenares MD;  [facility]     REPAIR GAMEKEEPER'S THUMB Right 12/2/2016    Procedure: REPAIR LIGAMENT ULNAR COLLATERAL THUMB (GAMEKEEPER'S);  Surgeon: Evin Zamorano MD;  Location:  OR     [facility] NONSPECIFIC PROCEDURE      right forearm fracture      Allergies   Allergen Reactions     Fentanyl Blisters     Per pt, after taking this medication had blisters develope     Tylenol [Acetaminophen] Itching     Dulaglutide Rash     Insulin Lispro Rash     Patient reported     Penicillin V Other (See Comments) and Rash     Diffuse maculopapular rash + feels \"high\", per pt.       Social History     Tobacco Use     Smoking status: Some Days     Packs/day: 0.25     Years: 40.00     Pack years: 10.00     Types: Cigarettes     Smokeless tobacco: Former   Substance Use Topics     Alcohol use: No     Alcohol/week: 0.0 standard drinks     Comment: Last etoh in 2007      Wt Readings from Last 1 Encounters:   03/13/23 63.5 kg (140 lb)        Anesthesia Evaluation            ROS/MED HX  ENT/Pulmonary:     (+) sleep apnea, asthma recent URI,     Neurologic:     (+) seizures,     Cardiovascular:     (+) -----Previous cardiac testing   Echo: Date: Results:    Stress Test: Date: Results:    ECG Reviewed: Date: 2/15/23 Results:  Sinus tachycardia   Otherwise normal ECG  Cath: Date: Results:      METS/Exercise Tolerance:     Hematologic:       Musculoskeletal:       GI/Hepatic: Comment:  prior appendectomy, prior gastric outlet obstruction,  Current left sided abdominal pain, found to have small bowel obstruction. Current Nausea and vomiting    (+) GERD,     Renal/Genitourinary:       Endo: Comment: Poorly controlled T2DM    (+) type II DM,     Psychiatric/Substance Use: Comment: PTSD (post-traumatic stress disorder)  History of substance " abuse (H)  methamphetamine abuse      Infectious Disease: Comment: HIV (human immunodeficiency virus infection) (H)    (+) HIV,     Malignancy:       Other:               OUTSIDE LABS:  CBC:   Lab Results   Component Value Date    WBC 9.8 03/28/2023    WBC 7.4 02/20/2023    HGB 14.2 03/28/2023    HGB 10.1 (L) 02/20/2023    HCT 41.9 03/28/2023    HCT 30.8 (L) 02/20/2023     (H) 03/28/2023     02/20/2023     BMP:   Lab Results   Component Value Date     (L) 03/28/2023     02/20/2023    POTASSIUM 4.4 03/28/2023    POTASSIUM 3.6 02/20/2023    CHLORIDE 97 (L) 03/28/2023    CHLORIDE 104 02/20/2023    CO2 21 (L) 03/28/2023    CO2 22 02/20/2023    BUN 21.8 (H) 03/28/2023    BUN 12.1 02/20/2023    CR 0.82 03/28/2023    CR 0.74 02/20/2023     (H) 03/28/2023     (H) 03/28/2023     COAGS:   Lab Results   Component Value Date    PTT 27 03/28/2023    INR 1.00 03/28/2023     POC:   Lab Results   Component Value Date     (H) 03/10/2021     HEPATIC:   Lab Results   Component Value Date    ALBUMIN 4.4 03/28/2023    PROTTOTAL 8.2 03/28/2023    ALT 14 03/28/2023    AST 20 03/28/2023    ALKPHOS 134 (H) 03/28/2023    BILITOTAL 0.5 03/28/2023     OTHER:   Lab Results   Component Value Date    PH 7.39 12/23/2022    LACT 1.3 03/28/2023    A1C 12.8 (H) 12/07/2022    LINDA 9.8 03/28/2023    PHOS 2.6 05/29/2022    MAG 2.0 02/20/2023    LIPASE 50 03/28/2023    AMYLASE 50 08/24/2021    TSH 0.87 09/25/2020    T4 1.02 04/14/2015    CRP 5.6 02/19/2022    SED 10 12/19/2020       Anesthesia Plan    ASA Status:  3, emergent    NPO Status:  NPO Appropriate    Anesthesia Type: General.     - Airway: ETT   Induction: RSI.   Maintenance: Balanced.   Techniques and Equipment:     - Lines/Monitors: 2nd IV     Consents            Postoperative Care    Pain management: IV analgesics, Oral pain medications, Multi-modal analgesia.   PONV prophylaxis: Droperidol or Haldol     Comments:                Jenifer Downing,  MD

## 2023-03-29 NOTE — ED TRIAGE NOTES
From home for L sided abdominal pain, N/V that started this afternoon  Thinks its a bowel obstruction  Diarrhea yesterday  Actively vomiting in triage     Triage Assessment     Row Name 03/28/23 1901       Triage Assessment (Adult)    Airway WDL WDL       Respiratory WDL    Respiratory WDL WDL       Skin Circulation/Temperature WDL    Skin Circulation/Temperature WDL WDL       Cardiac WDL    Cardiac WDL WDL       Peripheral/Neurovascular WDL    Peripheral Neurovascular WDL WDL       Cognitive/Neuro/Behavioral WDL    Cognitive/Neuro/Behavioral WDL WDL

## 2023-03-29 NOTE — H&P
General Surgery Consult Note  03/28/2023    Reason for consult: Concern for closed-loop obstruction  Consult requested by: ED      ASSESSMENT: 55-year-old male with history of appendectomy laparoscopic, HIV, methamphetamine abuse, recurrent SBO's presents with nausea vomiting abdominal pain with CT scan concerning for closed-loop obstruction.  Physical exam relatively benign soft, vitals within normal limits    RECOMMENDATIONS:    -NG tube for decompression (will confirm placement with x-ray)  -Serial abdominal exams  -We will trend labs including lactate  -N.p.o.  -IV fluids  - restarted home meds   -Admit to general surgery      Discussed with chief resident who will discuss with attending    Evin Orosco PGY2  Surgery      =======================    History of Present Illness:    Andrew Collier is a 55 year old male with history of laparoscopic appendectomy, HIV, methamphetamine abuse, recurrent SBO's presents to the ED with what feels like his small bowel obstruction.  Patient states that he had an episode of diarrhea followed by cramping abdominal pain which is typically how his obstructions presents.  He has never needed to be operated on for a small bowel obstruction.  Only abdominal surgery was appendectomy.  Patient is not on blood thinners.  Patient did have multiple episodes of emesis and is still currently having abdominal pain and nausea.  Abdominal pain is generalized and diffuse.  No fevers or chills.  No chest pain shortness of breath.    Past Medical History:  Past Medical History:   Diagnosis Date     Acute appendicitis with localized peritonitis 1/31/2018     AIDS (H)      Allergic rhinitis due to other allergen     DNS     Anal dysplasia      Chronic abdominal pain      CNS toxoplasmosis (H)      COVID-19 1/11/2022     Diabetes type 2, controlled (H)      GERD (gastroesophageal reflux disease)      Herpes zoster 9/23/2016     History of seizure      History of substance abuse (H)       Nausea, body aches, headache, nasal congestion, rhinorrhea with dark green mucus, post nasal drip, productive cough x 10 days.    HIV (human immunodeficiency virus infection) (H)      HLD (hyperlipidemia)      Lung nodules      Periungual wart      Pneumonia 1/6/2019     PTSD (post-traumatic stress disorder)      Sleep apnea     doesn't use CPAP       Past Surgical History  Past Surgical History:   Procedure Laterality Date     ANOSCOPY N/A 9/9/2020    Procedure: Exam Under Anesthesia, ANOSCOPY, fulgeration of rectal fissures with Rectal Biopsies;  Surgeon: Thanh Lundberg MD;  Location: [facility] OR     COLONOSCOPY Left 1/22/2016    Procedure: COMBINED COLONOSCOPY, SINGLE OR MULTIPLE BIOPSY/POLYPECTOMY BY BIOPSY;  Surgeon: Clark Saini MD;  Location: [facility] GI     ESOPHAGOSCOPY, GASTROSCOPY, DUODENOSCOPY (EGD), COMBINED N/A 6/6/2022    Procedure: ESOPHAGOGASTRODUODENOSCOPY, WITH BIOPSY;  Surgeon: Kecia Benítez MD;  Location: [facility] GI     HC EXPLORE UNDESC TESTIS,INGUIN/SCROTAL       LAPAROSCOPIC APPENDECTOMY N/A 1/31/2018    Procedure: LAPAROSCOPIC APPENDECTOMY;  LAPAROSCOPIC APPENDECTOMY;  Surgeon: Dawn Holt MD;  Location: [facility]     LAPAROSCOPY DIAGNOSTIC (GENERAL) N/A 7/26/2016    Procedure: LAPAROSCOPY DIAGNOSTIC (GENERAL);  Surgeon: Susannah Arriaga MD;  Location: [facility]     LAPAROSCOPY DIAGNOSTIC (GENERAL) N/A 4/16/2018    Procedure: LAPAROSCOPY DIAGNOSTIC (GENERAL);  Diagnostic laparoscopy and lysis of adhesions;  Surgeon: Prince Dowling MD;  Location: [facility] OR     OPTICAL TRACKING SYSTEM CRANIOTOMY, EXCISE TUMOR, COMBINED Left 4/10/2015    Procedure: COMBINED OPTICAL TRACKING SYSTEM CRANIOTOMY, EXCISE TUMOR;  Surgeon: Mirlande Colmenares MD;  Location: [facility] OR     REPAIR GAMEKEEPER'S THUMB Right 12/2/2016    Procedure: REPAIR LIGAMENT ULNAR COLLATERAL THUMB (GAMEKEEPER'S);  Surgeon: Evin Zamorano MD;  Location:  OR     [facility] NONSPECIFIC PROCEDURE      right forearm fracture       Family History:  Family History   Problem Relation Age of Onset     Diabetes Brother      Diabetes Father      Alzheimer Disease  Father      Unknown/Adopted Mother      Diabetes Paternal Grandfather      Cancer No family hx of         no skin cancer     Skin Cancer No family hx of         no famiy hx of skin cancer     Glaucoma No family hx of      Macular Degeneration No family hx of        Social History:  Social History     Social History Narrative    Born in [facility].  Came to the [place] in [place].  Last traveled to visit family in 2008.        1/7/2019 - Homeless. Working with a  at [employer]  trying to get housing. Sexually active with two partners recently using condoms 100% of the time. Smokes occasionally, not for the last 4 weeks.        1/7/2020 at [facility] since 1/1/2020. 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  Care established with ID and endocrine        Medications:  Current Outpatient Medications   Medication Sig Dispense Refill     acetaminophen (TYLENOL) 500 MG tablet Take 1-2 tablets (500-1,000 mg) by mouth every 6 hours as needed for mild pain 90 tablet 0     bictegravir-emtricitabine-tenofovir (BIKTARVY) -25 MG per tablet Take 1 tablet by mouth daily 30 tablet 2     blood glucose (NO BRAND SPECIFIED) lancets standard Use to test blood sugar 1 time daily or as directed. 100 lancet 4     blood glucose (NO BRAND SPECIFIED) test strip Use to test blood sugar 1 time daily or as directed. 100 strip 4     blood glucose monitoring (NO BRAND SPECIFIED) meter device kit Use to test blood sugar 1 time daily or as directed. 1 kit 0     buPROPion (WELLBUTRIN XL) 150 MG 24 hr tablet Take 1 tablet (150 mg) by mouth daily 60 tablet 0     famotidine (PEPCID) 40 MG tablet Take 1 tablet (40 mg) by mouth daily 60 tablet 1     glimepiride (AMARYL) 4 MG tablet Take 1 tablet (4 mg) by mouth 2 times daily (before meals) 90 tablet 1     ibuprofen (ADVIL/MOTRIN) 600 MG tablet Take 1 tablet (600 mg) by mouth every 6 hours as needed for moderate pain (4-6) 90 tablet 0     levofloxacin (LEVAQUIN) 500 MG tablet Take 1 tablet (500 mg) by mouth  "daily 3 tablet 0     metFORMIN (FORTAMET) 1000 MG 24 hr tablet Take 2 tablets (2,000 mg) by mouth daily (with dinner) 120 tablet 0     sitagliptin (JANUVIA) 100 MG tablet Take 1 tablet (100 mg) by mouth daily 60 tablet 0     sulfamethoxazole-trimethoprim (BACTRIM DS) 800-160 MG tablet Take 1 tablet by mouth 2 times daily 7 tablet 0       Allergies:  Allergies   Allergen Reactions     Fentanyl Blisters     Per pt, after taking this medication had blisters develope     Tylenol [Acetaminophen] Itching     Dulaglutide Rash     Insulin Lispro Rash     Patient reported     Penicillin V Other (See Comments) and Rash     Diffuse maculopapular rash + feels \"high\", per pt.        Review of Symptoms:  A 10 point review of symptoms has been conducted and is negative except for that mentioned in the above HPI.    Physical Exam:    Temp:  [97  F (36.1  C)] 97  F (36.1  C)  Pulse:  [90] 90  Resp:  [16] 16  BP: (144)/(78) 144/78  SpO2:  [98 %] 98 %    Gen: NAD, resting comfortably  HEENT: NCAT, sclera anicteric  CV: RRR  Resp: nonlabored respirations, comfortable on room air  Abd: soft, minimal tenderness to palpation, nondistended.  No rebound guarding or rigidity.  No signs of peritonitis.  Ext: WWP w/o edema  Neuro: no focal deficits  Skin: No rashes    Labs:  Sodium 132  Potassium 4.4  Creatinine 0.82  Lactic acid 1.3  WBC 9.8  Hemoglobin 14.2  Coags within normal limits      Imaging:    CT AP  IMPRESSION:1. Dilated loop of small bowel with transition to non  dilated caliber in the mid abdomen, concerning for small bowel  obstruction. No pneumatosis or portal venous gas. No pneumoperitoneum  or intra-abdominal collection.  2. Cholelithiasis without cholecystitis  3. Additional findings similar to CT from 2/6/23        "

## 2023-03-29 NOTE — PROGRESS NOTES
Resident/Fellow Attestation   I, Jarocho Boss MD, was present with the medical/KASSY student who participated in the service and in the documentation of the note.  I have verified the history and personally performed the physical exam and medical decision making.  I agree with the assessment and plan of care as documented in the note.      SBO. Possible GGC later today    Jarocho Boss MD  PGY2  Date of Service (when I saw the patient): 03/29/23    [facility]    Progress Note - EGS Service       Date of Admission:  3/28/2023    Assessment & Plan: Surgery   55-year-old male with history of appendectomy laparoscopic, HIV, methamphetamine abuse, recurrent SBO's presents with n/v and abdominal pain. Initial CT with concern for closed-loop obstruction, however on review more consistent with small bowel obstruction.  Physical exam benign, vitals within normal limits. Per chart review, previous exlap for similar symptoms in 2016 showed areas of dilated bowel loops    - pain control  - ngt to lis, possible ggc  - iv meds when posible  - electrolyte replacements       Diet: NPO per Anesthesia Guidelines for Procedure/Surgery Except for: Meds    DVT Prophylaxis: Enoxaparin (Lovenox) SQ  Potts Catheter: Not present  Lines: None     Drains: None     Cardiac Monitoring: None  Code Status: Full Code      Clinically Significant Risk Factors Present on Admission                      # DMII: A1C = N/A within past 6 months            Disposition Plan      Expected Discharge Date: 03/31/2023                 The patient's care was discussed with the Chief Resident/Fellow, who will discuss with staff.    Andrea MCCRAY [facility]  Non-urgent messages: Securely message with [employer] (more info)  Text page via [employer] Paging/Directory     ______________________________________________________________________    Interval History   Nursing notes  reviewed, no acute events overnight. Repeat abdominal exams with no changes. Continues to endorse abdominal pain. Refusing labs and vitals per nursing    Physical Exam   Vital Signs: Temp: 98.4  F (36.9  C) Temp src: Oral BP: 136/76 Pulse: 90   Resp: 16 SpO2: 98 % O2 Device: None (Room air)    Weight: 0 lbs 0 oz    Intake/Output Summary (Last 24 hours) at 3/29/2023 0820  Last data filed at 3/29/2023 0235  Gross per 24 hour   Intake 950 ml   Output --   Net 950 ml     General Appearance: NAD, laying in bed  Respiratory: NLB  Cardiovascular: RRR  GI: mildly distended. Tender in LLQ. No rebound or guarding   Skin: wwp       Data     I have personally reviewed the following data over the past 24 hrs:    9.8  \   14.2   / 474 (H)     132 (L) 97 (L) 21.8 (H) /  287 (H)   4.4 21 (L) 0.82 \       ALT: 14 AST: 20 AP: 134 (H) TBILI: 0.5   ALB: 4.4 TOT PROTEIN: 8.2 LIPASE: 50       Procal: N/A CRP: N/A Lactic Acid: 1.3       INR:  1.00 PTT:  27   D-dimer:  N/A Fibrinogen:  N/A       Imaging results reviewed over the past 24 hrs:   Recent Results (from the past 24 hour(s))   CT Abdomen Pelvis w Contrast   Result Value    Radiologist flags Findings concerning for closed loop SBO (Urgent)    Narrative    EXAMINATION: CT ABDOMEN PELVIS W CONTRAST, 3/28/2023 9:19 PM    INDICATION: L SIDED PAIN, VOMITING, ? SBO    COMPARISON STUDY: 2/6/2023    TECHNIQUE: CT scan of the abdomen and pelvis was performed on  multidetector CT scanner using volumetric acquisition technique and  images were reconstructed in multiple planes with variable thickness  and reviewed on dedicated workstations.     CONTRAST: iopamidol (ISOVUE-370) solution 80 mL injected IV with oral  contrast    CT scan radiation dose is optimized to minimum requisite dose using  automated dose modulation techniques.    FINDINGS:    Lower thorax: Normal.    Liver: No mass. No intrahepatic biliary ductal dilation.  Geographic  hypodensity favored to represent focal fatty  infiltration.    Biliary System: Cholelithiasis without evidence of acute  cholecystitis. No extrahepatic biliary ductal dilation.    Pancreas: No mass or pancreatic ductal dilation.    Adrenal glands: No mass or nodules    Spleen: Normal.    Kidneys: No suspicious mass, obstructing calculus or hydronephrosis.    Gastrointestinal tract :Dilated loop of small bowel with transition in  the mid abdomen(Series 3, image 95)).No pneumatosis or portal venous  gas. No pneumoperitoneum or intra-abdominal collection.   Normal large bowel.    Mesentery/peritoneum/retroperitoneum: No mass. No free fluid or air.    Lymph nodes: No significant lymphadenopathy.    Vasculature: Patent major abdominal vasculature.    Pelvis: Urinary bladder is normal.  Prostate is enlarged. Penile pump  is visualized.    Osseous structures: No aggressive or acute osseous lesion.      Soft tissues: Small fat containing ventral/umbilical hernia.      Impression    IMPRESSION:1. Dilated loop of small bowel with transition to non  dilated caliber in the mid abdomen, concerning for small bowel  obstruction. No pneumatosis or portal venous gas. No pneumoperitoneum  or intra-abdominal collection.  2. Cholelithiasis without cholecystitis  3. Additional findings similar to CT from 2/6/23     Findings discussed with surgery resident by Jeison at 11:17 PM   3/28/2023     I have personally reviewed the examination and initial interpretation  and I agree with the findings.    LAURYN FOURNIER MD         SYSTEM ID:  [id number]   XR Abdomen Port 1 View    Narrative    EXAM: XR ABDOMEN PORT 1 VIEW  LOCATION: [facility]  DATE/TIME: 3/29/2023 2:26 AM    INDICATION: confirm placement of NG tube  COMPARISON: 02/07/2023      Impression    IMPRESSION: Enteric tube terminates over the gastric fundus. Gas-filled loop of transverse colon.

## 2023-03-29 NOTE — PROVIDER NOTIFICATION
Paged General Surgery Intern:    Pt states he is very hungry and has a headache from not eating. He is concerned about BG dropping, however current .

## 2023-03-30 LAB
ANION GAP SERPL CALCULATED.3IONS-SCNC: 10 MMOL/L (ref 7–15)
BUN SERPL-MCNC: 12.9 MG/DL (ref 6–20)
CALCIUM SERPL-MCNC: 9.1 MG/DL (ref 8.6–10)
CHLORIDE SERPL-SCNC: 102 MMOL/L (ref 98–107)
CREAT SERPL-MCNC: 0.66 MG/DL (ref 0.67–1.17)
DEPRECATED HCO3 PLAS-SCNC: 24 MMOL/L (ref 22–29)
ERYTHROCYTE [DISTWIDTH] IN BLOOD BY AUTOMATED COUNT: 13.3 % (ref 10–15)
GFR SERPL CREATININE-BSD FRML MDRD: >90 ML/MIN/1.73M2
GLUCOSE SERPL-MCNC: 178 MG/DL (ref 70–99)
HCT VFR BLD AUTO: 37.9 % (ref 40–53)
HGB BLD-MCNC: 12.1 G/DL (ref 13.3–17.7)
MAGNESIUM SERPL-MCNC: 1.7 MG/DL (ref 1.7–2.3)
MCH RBC QN AUTO: 27.4 PG (ref 26.5–33)
MCHC RBC AUTO-ENTMCNC: 31.9 G/DL (ref 31.5–36.5)
MCV RBC AUTO: 86 FL (ref 78–100)
PHOSPHATE SERPL-MCNC: 2.7 MG/DL (ref 2.5–4.5)
PLATELET # BLD AUTO: 344 10E3/UL (ref 150–450)
POTASSIUM SERPL-SCNC: 3.6 MMOL/L (ref 3.4–5.3)
RBC # BLD AUTO: 4.42 10E6/UL (ref 4.4–5.9)
SODIUM SERPL-SCNC: 136 MMOL/L (ref 136–145)
WBC # BLD AUTO: 5.1 10E3/UL (ref 4–11)

## 2023-03-30 PROCEDURE — 250N000013 HC RX MED GY IP 250 OP 250 PS 637: Performed by: STUDENT IN AN ORGANIZED HEALTH CARE EDUCATION/TRAINING PROGRAM

## 2023-03-30 PROCEDURE — 120N000002 HC R&B MED SURG/OB UMMC

## 2023-03-30 PROCEDURE — 258N000003 HC RX IP 258 OP 636: Performed by: STUDENT IN AN ORGANIZED HEALTH CARE EDUCATION/TRAINING PROGRAM

## 2023-03-30 PROCEDURE — 999N000157 HC STATISTIC RCP TIME EA 10 MIN

## 2023-03-30 PROCEDURE — 36415 COLL VENOUS BLD VENIPUNCTURE: CPT

## 2023-03-30 PROCEDURE — 83735 ASSAY OF MAGNESIUM: CPT

## 2023-03-30 PROCEDURE — 84100 ASSAY OF PHOSPHORUS: CPT

## 2023-03-30 PROCEDURE — 85014 HEMATOCRIT: CPT

## 2023-03-30 PROCEDURE — 250N000013 HC RX MED GY IP 250 OP 250 PS 637

## 2023-03-30 PROCEDURE — 250N000011 HC RX IP 250 OP 636

## 2023-03-30 PROCEDURE — 80048 BASIC METABOLIC PNL TOTAL CA: CPT

## 2023-03-30 RX ORDER — ENOXAPARIN SODIUM 100 MG/ML
40 INJECTION SUBCUTANEOUS EVERY 24 HOURS
Status: DISCONTINUED | OUTPATIENT
Start: 2023-03-30 | End: 2023-03-31 | Stop reason: HOSPADM

## 2023-03-30 RX ORDER — CALCIUM CARBONATE 500 MG/1
500 TABLET, CHEWABLE ORAL DAILY PRN
Status: DISCONTINUED | OUTPATIENT
Start: 2023-03-30 | End: 2023-03-31 | Stop reason: HOSPADM

## 2023-03-30 RX ADMIN — CALCIUM CARBONATE (ANTACID) CHEW TAB 500 MG 500 MG: 500 CHEW TAB at 22:47

## 2023-03-30 RX ADMIN — GLIMEPIRIDE 4 MG: 4 TABLET ORAL at 17:38

## 2023-03-30 RX ADMIN — ENOXAPARIN SODIUM 40 MG: 40 INJECTION SUBCUTANEOUS at 13:06

## 2023-03-30 RX ADMIN — ACETAMINOPHEN 975 MG: 325 TABLET ORAL at 17:32

## 2023-03-30 RX ADMIN — METFORMIN ER 500 MG 2000 MG: 500 TABLET ORAL at 17:33

## 2023-03-30 RX ADMIN — BICTEGRAVIR SODIUM, EMTRICITABINE, AND TENOFOVIR ALAFENAMIDE FUMARATE 1 TABLET: 50; 200; 25 TABLET ORAL at 07:39

## 2023-03-30 RX ADMIN — SODIUM CHLORIDE, POTASSIUM CHLORIDE, SODIUM LACTATE AND CALCIUM CHLORIDE: 600; 310; 30; 20 INJECTION, SOLUTION INTRAVENOUS at 17:41

## 2023-03-30 RX ADMIN — SODIUM CHLORIDE, POTASSIUM CHLORIDE, SODIUM LACTATE AND CALCIUM CHLORIDE: 600; 310; 30; 20 INJECTION, SOLUTION INTRAVENOUS at 06:02

## 2023-03-30 RX ADMIN — BUPROPION HYDROCHLORIDE 150 MG: 150 TABLET, FILM COATED, EXTENDED RELEASE ORAL at 07:39

## 2023-03-30 ASSESSMENT — ACTIVITIES OF DAILY LIVING (ADL)
ADLS_ACUITY_SCORE: 20

## 2023-03-30 NOTE — PLAN OF CARE
Goal Outcome Evaluation:    Temp: 98  F (36.7  C) Temp src: Oral BP: 124/80 Pulse: 94   Resp: 18 SpO2: 94 % O2 Device: None (Room air)      Up walking in halls independently. A & O x4. VSS. RA. Tolerating clears. No c/o nausea. LR decreased to 50ml/hr. Voiding spontaneously. No BM

## 2023-03-30 NOTE — PLAN OF CARE
Goal Outcome Evaluation:    Assumed cares from 11a-3:30p    A&Ox4; calling appropriately. VSS on RA. Pt reporting abdominal fullness but declined PRN pain meds. Reports he is passing gas but no BM. Clear liquids, tolerating. Denies nausea. (R) PIV- infusing LR at 100 mL/hr. Showered today. Up independently. Resting in between cares. Continue to monitor and follow plan of care.

## 2023-03-30 NOTE — CONSULTS
Care Management Initial Consult    General Information  Assessment completed with:  Patient at bedside along with chart review.     Primary Care Provider verified and updated as needed:   Yes. Dr. Chevy Reynoso  Readmission within the last 30 days:   Yes. Admitted 2/15 and 2/6.        Advance Care Planning:      None on file.     Communication Assessment  Patient's communication style: spoken language - English   Hearing Difficulty or Deaf: no   Wear Glasses or Blind: no    Cognitive  Cognitive/Neuro/Behavioral: WDL                      Living Environment:   People in home:    Self and other residents.  Current living Arrangements:   Pt is homeless and lives at [facility] in [place].  Able to return to prior arrangements:  yes     Family/Social Support:  Care provided by:  Self and staff at [facility]  Provides care for:  No one.  Description of Support System:    Pt reports no family or friends that provide support to him. Pt reports that the staff at [facility] help him with meals, med mgmt, etc...     Current Resources:   Patient receiving home care services:  no  Community Resources:  Pt reports that he has not used many community resources aside from help provided at [facility] and meals at [facility].  Equipment currently used at home: none  Supplies currently used at home:  none    Employment/Financial:  Employment Status:    Pt reports that he lost his work permit and has not been working.   SW asked if he planned to get a work permit and begin to work, pt stated that he is not planning to work, but rather go to school. 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 He has completed the FAFSA and plans to use [employer] funding and loans.     Financial Concerns:   No other concerns reported by patient.     Lifestyle & Psychosocial Needs:  Social Determinants of Health     Tobacco Use: High Risk     Smoking Tobacco Use: Some Days     Smokeless Tobacco Use: Former      "Passive Exposure: Not on file   Alcohol Use: Not on file   Financial Resource Strain: Not on file   Food Insecurity: Not on file   Transportation Needs: Not on file   Physical Activity: Not on file   Stress: Not on file   Social Connections: Not on file   Intimate Partner Violence: Not on file   Depression: Not at risk     PHQ-2 Score: 0   Housing Stability: Not on file       Functional Status:  Prior to admission patient needed assistance: Patient was not working or driving PTH. Patient reports that most of his meals are from the facility, so he does not do any cooking.     Mental Health Status:  Pt denies any hx of anxiety, depression or other mental health diagnosis/concerns.     Chemical Dependency Status:  Pt has a hx of meth abuse. He shared with SW that he quit 2x prior, but this time he is 45 days sober. SW congratulated pt and expressed that this is a huge success and exchanged fist bumps. Pt was pretty proud of his sobriety. Pt reports that he has not gone to treatment. SW asked if a support group in the community would be helpful. Pt stated that he is ok on his own.         Values/Beliefs:  Spiritual, Cultural Beliefs, Mandaeism Practices, Values that affect care:      Pt is Bahai.         Additional Information:     (covering 7D) helped out with unit CM team to complete assessment.   SW met with patient at bedside to complete an assessment due to pt's elevated risk score and discharge planning needs. Chart review also completed.    Per Physician, patient is a \"55 year old male with a past medical history of toxoplasma encephalitis, HIV, pulmonary nodules, small bowel obstruction, GERD, type 2 diabetes, constipation, anxiety/depression, prior appendectomy, methamphetamine abuse, gastric outlet obstruction, homelessness who presents to the emergency department with a chief complaint of abdominal pain.\"    Patient was sleeping when entering room, but happy and talkative with SW during " assessment.     SW/RNCC to follow and assist with any discharge needs that may arise.    RADHA Us, LGSW  Coverage   [email or web address]

## 2023-03-30 NOTE — PLAN OF CARE
/77 (BP Location: Right arm)   Pulse 82   Temp 97.8  F (36.6  C) (Oral)   Resp 17   SpO2 98%     Status: Bowel obstruction, recurring/chronic  Activity: Up ad lexi  Neuros: A&Ox4, no deficits noted.  Cardiac: WDL, denies chest pain.  Respiratory: WDL on RA, denies SOB.  GI/: Hypoactive BS, not passing gas, pt reports a very small BM on 3/27. Voids spont with AUOP using bedside urinal.  Diet: Strict NPO; allowed mouth swabs for comfort. Pt is fixated on eating and drinking, but his diet orders have been verified multiple times with the primary team and explained at length to him.  Skin/Incisions: WDL ex pt refused full skin assessment on admission.  Lines/Drains: R PIV running LR at 100. NG to LIS, site unchanged, NG pulling small amounts of thick clear output.  Pain/Nausea: Denies.  New Changes: No acute changes; pt admitted from ED around 2000 this shift.

## 2023-03-30 NOTE — PROGRESS NOTES
Blood pressure 114/69, pulse 85, temperature 97.9  F (36.6  C), temperature source Oral, resp. rate 18, SpO2 97 %.    Activity: Up ad lexi  Neuros:  AOX4  Cardiac: WDL  Respiratory: WDL  GI/: Voiding spontaneous, +BS, no BM  Diet: Clear liquid, tolerating  Skin/Incisions/Drains: WDL  Lines: Rt PIV LR 100hr  Pain: Denies pain  Plan: Continue with POC, patient gets upset if he request something that it is not done immediately.

## 2023-03-30 NOTE — PROGRESS NOTES
[facility]    Progress Note - EGS Service       Date of Admission:  3/28/2023    Assessment & Plan: Surgery   55-year-old male with history of appendectomy laparoscopic, HIV, methamphetamine abuse, recurrent SBO's presents with n/v and abdominal pain. Initial CT with concern for closed-loop obstruction, however on review more consistent with small bowel obstruction.  Physical exam benign, vitals within normal limits. Per chart review, previous exlap for similar symptoms in 2016 showed areas of dilated bowel loops    - passing gas as of 3/29. Will discontinue ngt and start CLD  - po pain meds  - electrolyte replacements       Diet: Clear Liquid Diet    DVT Prophylaxis: lovenox  Potts Catheter: Not present  Lines: None     Drains: None     Cardiac Monitoring: None  Code Status: Full Code      Clinically Significant Risk Factors                       # DMII: A1C = N/A within past 6 months, PRESENT ON ADMISSION          Disposition Plan      Expected Discharge Date: 04/02/2023                 The patient's care was discussed with the Chief Resident/Fellow.    Jarocho Boss MD  [facility]  Non-urgent messages: Securely message with [employer] (more info)  Text page via [employer] Paging/Directory     ______________________________________________________________________    Interval History   No acute events  Passing gas  Pain is minimal and better / controlled    Physical Exam   Vital Signs: Temp: 97.9  F (36.6  C) Temp src: Oral BP: 114/69 Pulse: 85   Resp: 18 SpO2: 97 % O2 Device: None (Room air)    Weight: 0 lbs 0 oz    Intake/Output Summary (Last 24 hours) at 3/30/2023 1233  Last data filed at 3/30/2023 1221  Gross per 24 hour   Intake 600 ml   Output 1850 ml   Net -1250 ml     General Appearance: nad  Respiratory: nlb  Cardiovascular: rrr  GI: soft, nd, nt      Data     I have personally reviewed the following data over the past 24  hrs:    5.1  \   12.1 (L)   / 344     136 102 12.9 /  178 (H)   3.6 24 0.66 (L) \       Imaging results reviewed over the past 24 hrs:   No results found for this or any previous visit (from the past 24 hour(s)).

## 2023-03-31 VITALS
RESPIRATION RATE: 16 BRPM | HEART RATE: 82 BPM | OXYGEN SATURATION: 99 % | DIASTOLIC BLOOD PRESSURE: 76 MMHG | TEMPERATURE: 98 F | SYSTOLIC BLOOD PRESSURE: 119 MMHG

## 2023-03-31 PROCEDURE — 250N000013 HC RX MED GY IP 250 OP 250 PS 637: Performed by: STUDENT IN AN ORGANIZED HEALTH CARE EDUCATION/TRAINING PROGRAM

## 2023-03-31 PROCEDURE — 250N000013 HC RX MED GY IP 250 OP 250 PS 637

## 2023-03-31 RX ADMIN — SITAGLIPTIN 100 MG: 100 TABLET, FILM COATED ORAL at 08:15

## 2023-03-31 RX ADMIN — FAMOTIDINE 40 MG: 20 TABLET ORAL at 08:15

## 2023-03-31 RX ADMIN — BICTEGRAVIR SODIUM, EMTRICITABINE, AND TENOFOVIR ALAFENAMIDE FUMARATE 1 TABLET: 50; 200; 25 TABLET ORAL at 08:15

## 2023-03-31 RX ADMIN — GLIMEPIRIDE 4 MG: 4 TABLET ORAL at 08:22

## 2023-03-31 RX ADMIN — ACETAMINOPHEN 975 MG: 325 TABLET ORAL at 08:15

## 2023-03-31 RX ADMIN — BUPROPION HYDROCHLORIDE 150 MG: 150 TABLET, FILM COATED, EXTENDED RELEASE ORAL at 08:15

## 2023-03-31 ASSESSMENT — ACTIVITIES OF DAILY LIVING (ADL)
ADLS_ACUITY_SCORE: 20

## 2023-03-31 NOTE — PLAN OF CARE
Goal Outcome Evaluation:      Plan of Care Reviewed With: patient             /76 (BP Location: Right arm)   Pulse 82   Temp 98  F (36.7  C) (Oral)   Resp 16   SpO2 99%     Patient met goal for hospital stay; discharged to home at 1033 with discharged prescriptions and all belongings after completion of discharge teaching. A copy of AVS was given to patient, was picked up by cab, ambulated out of unit with a staff. Had no further questions at the time of discharge and no unmet needs were identified.

## 2023-03-31 NOTE — DISCHARGE SUMMARY
GENERAL SURGERY DISCHARGE SUMMARY  Patient Name: Andrew Collier  MR#: [id number]  Date of Admission: 3/28/2023  7:27 PM  Date of Discharge: 3/31/2023  Operating Physician: none  Discharging Physician: Dr. Vaz    Discharge Diagnoses:  1. Left sided abdominal pain    2. Nausea and vomiting, unspecified vomiting type    3. Small bowel obstruction (H)    4. Intestinal obstruction, unspecified cause, unspecified whether partial or complete (H)      Procedures Performed this admission:  None    Consultations:  none    Follow up:  None needed    Brief HPI:  Andrew is a patient known to the EGS service who presents with recurrent SBO in setting of his previous abdominal surgeries.  Initially he was thought to have a closed loop obstruction but on review his CT and clinical picture was more consistent with SBO.    Hospital Course:   Patient was admitted for SBO and treated non-operatively with nasogastric tube decompression. He recovered appropriately and started to pass gas on day of discharge.    On day of discharge, the patient was deemed to be in stable and improved condition and discharged with appropriate follow up instructions. At that time the patient was tolerating a regular diet with return of normal bowel function, pain was controlled with oral medications and the patient was ambulating and voiding independently.    Pathology:  none    Discharge Exam:  /76 (BP Location: Right arm)   Pulse 82   Temp 98  F (36.7  C) (Oral)   Resp 16   SpO2 99% .  NAD  RRR  NLB  abd soft, nontender, nondistended    Medications on Discharge:      Review of your medicines      UNREVIEWED medicines. Ask your doctor about these medicines      Dose / Directions   buPROPion 150 MG 24 hr tablet  Commonly known as: WELLBUTRIN XL  Used for: Methamphetamine abuse (H)      Dose: 150 mg  Take 1 tablet (150 mg) by mouth daily  Quantity: 60 tablet  Refills: 0     glimepiride 4 MG tablet  Commonly known as: AMARYL  Used for:  Type 2 diabetes mellitus without complication, without long-term current use of insulin (H)      Dose: 4 mg  Take 1 tablet (4 mg) by mouth 2 times daily (before meals)  Quantity: 90 tablet  Refills: 1     levofloxacin 500 MG tablet  Commonly known as: Levaquin  Indication: Infection of the Skin and/or Soft Tissue  Used for: Cellulitis of left wrist      Dose: 500 mg  Take 1 tablet (500 mg) by mouth daily  Quantity: 3 tablet  Refills: 0     metFORMIN osmotic 1000 MG 24 hr tablet  Commonly known as: FORTAMET  Used for: Type 2 diabetes mellitus without complication, without long-term current use of insulin (H)      Dose: 2,000 mg  Take 2 tablets (2,000 mg) by mouth daily (with dinner)  Quantity: 120 tablet  Refills: 0     sitagliptin 100 MG tablet  Commonly known as: JANUVIA  Used for: Type 2 diabetes mellitus without complication, without long-term current use of insulin (H)      Dose: 100 mg  Take 1 tablet (100 mg) by mouth daily  Quantity: 60 tablet  Refills: 0     sulfamethoxazole-trimethoprim 800-160 MG tablet  Commonly known as: BACTRIM DS  Indication: Infection of the Skin and/or Soft Tissue  Used for: Cellulitis of left wrist      Dose: 1 tablet  Take 1 tablet by mouth 2 times daily  Quantity: 7 tablet  Refills: 0        CONTINUE these medicines which have NOT CHANGED      Dose / Directions   acetaminophen 500 MG tablet  Commonly known as: TYLENOL  Used for: Cellulitis of left wrist      Dose: 500-1,000 mg  Take 1-2 tablets (500-1,000 mg) by mouth every 6 hours as needed for mild pain  Quantity: 90 tablet  Refills: 0     Biktarvy -25 MG per tablet  Used for: Human immunodeficiency virus (HIV) disease (H)  Generic drug: bictegravir-emtricitabine-tenofovir      Dose: 1 tablet  Take 1 tablet by mouth daily  Quantity: 30 tablet  Refills: 2     blood glucose lancets standard  Commonly known as: NO BRAND SPECIFIED  Used for: Type 2 diabetes mellitus with hyperglycemia, with long-term current use of insulin  (H)      Use to test blood sugar 1 time daily or as directed.  Quantity: 100 lancet  Refills: 4     blood glucose monitoring meter device kit  Commonly known as: NO BRAND SPECIFIED  Used for: Type 2 diabetes mellitus with hyperglycemia, with long-term current use of insulin (H)      Use to test blood sugar 1 time daily or as directed.  Quantity: 1 kit  Refills: 0     blood glucose test strip  Commonly known as: NO BRAND SPECIFIED  Used for: Type 2 diabetes mellitus with hyperglycemia, with long-term current use of insulin (H)      Use to test blood sugar 1 time daily or as directed.  Quantity: 100 strip  Refills: 4     famotidine 40 MG tablet  Commonly known as: PEPCID  Used for: Gastroesophageal reflux disease, unspecified whether esophagitis present      Dose: 40 mg  Take 1 tablet (40 mg) by mouth daily  Quantity: 60 tablet  Refills: 1     ibuprofen 600 MG tablet  Commonly known as: ADVIL/MOTRIN  Used for: Cellulitis of left wrist      Dose: 600 mg  Take 1 tablet (600 mg) by mouth every 6 hours as needed for moderate pain (4-6)  Quantity: 90 tablet  Refills: 0          Discharge Procedure Orders   Reason for your hospital stay   Order Comments: You were hospitalized for a small bowel obstruction which has now resolved without intervention     Activity   Order Comments: Your activity upon discharge: activity as tolerated     Order Specific Question Answer Comments   Is discharge order? Yes      Adult [facility] Follow-up and recommended labs and tests   Order Comments: No follow up is needed     Diet   Order Comments: Follow this diet upon discharge: Orders Placed This Encounter      Regular Diet Adult     Order Specific Question Answer Comments   Is discharge order? Yes        All patient's and family's concerns were addressed prior to discharge. Patient discussed with team on the day of discharge.

## 2023-03-31 NOTE — PLAN OF CARE
Temp: 98  F (36.7  C) Temp src: Oral BP: 124/80 Pulse: 94   Resp: 18 SpO2: 94 % O2 Device: None (Room air)         Time of care: 1900-0730  Reason for admission: SBO    NEURO: A&O  RESPIRATORY: denies SOB. sats 94% on RA.   CARDIAC: vss, denies cardiac chest pain  GI/: voiding not saving. +flatus.   DIET: clears, tolerating  PAIN/NAUSEA: denies  INCISION/DRAINS/SKIN: refused assessment  IV ACCESS: PIV w/ LR @50ml/hr  ACTIVITY: UAL  LAB: reviewed  CHANGES: 1 BM overnight. Requesting regular diet, changed this AM.   PLAN: continue w/ POC. Pt to discharge today after breakfast

## 2023-03-31 NOTE — PROVIDER NOTIFICATION
7B rm 24 cipriano childs requesting regular diet, can i advance? i caught him eating crackers earlier and he tolerated them, he has had 2 BMs tonight. Thanks! manjinder webb *[phone]

## 2023-03-31 NOTE — PROGRESS NOTES
CHW scheduled ride at10:45 from front entrance to pt's home address (confirmed with pt), left CHW's phone # for cab to call and CHW will walk pt down (confirmed with pt).    Felisa Bird   7A/B Community Health Worker   Phone: [phone]

## 2023-04-03 ENCOUNTER — PATIENT OUTREACH (OUTPATIENT)
Dept: SURGERY | Facility: CLINIC | Age: 58
End: 2023-04-03
Payer: COMMERCIAL

## 2023-04-03 NOTE — PROGRESS NOTES
04/03/23    10:20 AM     Andrew Collier is a patient of General Surgery Team that was hospitalized with a SBO that was managed conservatively.  He was recently discharged from the hospital.  Attempted to contact patient via telephone for a status update and review post discharge  teaching.  Number out of service x 2. Unable to reach the patient.    Of note:  Wound:  No surgery  Follow-up:  PRN  Restrictions:  - No activity restrictions  New medications:  None  Equipment/Supplies:  None

## 2023-04-07 ENCOUNTER — TELEPHONE (OUTPATIENT)
Dept: PHARMACY | Facility: CLINIC | Age: 58
End: 2023-04-07
Payer: COMMERCIAL

## 2023-04-07 NOTE — TELEPHONE ENCOUNTER
Prior Authorization Retail Medication Request    Medication/Dose: Januvia 100 MG  ICD code (if different than what is on RX):    Previously Tried and Failed:  Metfromin, glimiperide  Rationale:      Insurance Name:  [employer]  Insurance ID:  [id number]      Pharmacy Information (if different than what is on RX)  Name:  [facility]  Phone:  [phone]    Lenka Mabry CPhT  [facility]  [phone]

## 2023-04-11 NOTE — TELEPHONE ENCOUNTER
PA Initiation    Medication: sitagliptin (JANUVIA) 100 MG tablet   Insurance Company: [employer] - Phone [phone] Fax [phone]  Pharmacy Filling the Rx: [facility] - [place], [place] - [address] [place] [address]  Filling Pharmacy Phone: [phone]  Filling Pharmacy Fax: [phone]  Start Date: 4/11/2023

## 2023-04-13 NOTE — TELEPHONE ENCOUNTER
Prior Authorization Approval    Authorization Effective Date: 4/11/2023  Authorization Expiration Date: 4/10/2024  Medication: sitagliptin (JANUVIA) 100 MG tablet   Approved Dose/Quantity:   Reference #:     Insurance Company: [employer] - Phone [phone] Fax [phone]  Expected CoPay:       CoPay Card Available:      Foundation Assistance Needed:    Which Pharmacy is filling the prescription (Not needed for infusion/clinic administered): [facility] - [place], [place] - [address] [place] [address]  Pharmacy Notified: Yes  Patient Notified: Yes **Instructed pharmacy to notify patient when script is ready to /ship.**      Received approval info verbally from Rose at [employer].

## 2023-04-16 ENCOUNTER — HOSPITAL ENCOUNTER (INPATIENT)
Facility: CLINIC | Age: 58
LOS: 1 days | Discharge: LEFT BEFORE DISCHARGE | End: 2023-04-16
Attending: EMERGENCY MEDICINE | Admitting: EMERGENCY MEDICINE
Payer: COMMERCIAL

## 2023-04-16 ENCOUNTER — APPOINTMENT (OUTPATIENT)
Dept: CT IMAGING | Facility: CLINIC | Age: 58
End: 2023-04-16
Attending: EMERGENCY MEDICINE
Payer: COMMERCIAL

## 2023-04-16 VITALS
BODY MASS INDEX: 25.69 KG/M2 | HEART RATE: 79 BPM | OXYGEN SATURATION: 100 % | HEIGHT: 63 IN | RESPIRATION RATE: 15 BRPM | TEMPERATURE: 97.9 F | SYSTOLIC BLOOD PRESSURE: 140 MMHG | WEIGHT: 145 LBS | DIASTOLIC BLOOD PRESSURE: 87 MMHG

## 2023-04-16 DIAGNOSIS — K56.609 SMALL BOWEL OBSTRUCTION (H): ICD-10-CM

## 2023-04-16 DIAGNOSIS — E87.6 HYPOKALEMIA: ICD-10-CM

## 2023-04-16 LAB
ALBUMIN SERPL BCG-MCNC: 3.6 G/DL (ref 3.5–5.2)
ALBUMIN UR-MCNC: NEGATIVE MG/DL
ALP SERPL-CCNC: 77 U/L (ref 40–129)
ALT SERPL W P-5'-P-CCNC: 12 U/L (ref 10–50)
ANION GAP SERPL CALCULATED.3IONS-SCNC: 16 MMOL/L (ref 7–15)
APPEARANCE UR: CLEAR
AST SERPL W P-5'-P-CCNC: 16 U/L (ref 10–50)
ATRIAL RATE - MUSE: 81 BPM
BASOPHILS # BLD AUTO: 0.1 10E3/UL (ref 0–0.2)
BASOPHILS NFR BLD AUTO: 1 %
BILIRUB SERPL-MCNC: 0.8 MG/DL
BILIRUB UR QL STRIP: NEGATIVE
BUN SERPL-MCNC: 16.3 MG/DL (ref 8–23)
CALCIUM SERPL-MCNC: 8 MG/DL (ref 8.6–10)
CHLORIDE SERPL-SCNC: 105 MMOL/L (ref 98–107)
COLOR UR AUTO: ABNORMAL
CREAT SERPL-MCNC: 0.66 MG/DL (ref 0.67–1.17)
DEPRECATED HCO3 PLAS-SCNC: 16 MMOL/L (ref 22–29)
DIASTOLIC BLOOD PRESSURE - MUSE: NORMAL MMHG
EOSINOPHIL # BLD AUTO: 0.1 10E3/UL (ref 0–0.7)
EOSINOPHIL NFR BLD AUTO: 2 %
ERYTHROCYTE [DISTWIDTH] IN BLOOD BY AUTOMATED COUNT: 13.6 % (ref 10–15)
GFR SERPL CREATININE-BSD FRML MDRD: >90 ML/MIN/1.73M2
GLUCOSE BLDC GLUCOMTR-MCNC: 403 MG/DL (ref 70–99)
GLUCOSE SERPL-MCNC: 340 MG/DL (ref 70–99)
GLUCOSE UR STRIP-MCNC: >=1000 MG/DL
HCT VFR BLD AUTO: 36.1 % (ref 40–53)
HGB BLD-MCNC: 12.1 G/DL (ref 13.3–17.7)
HGB UR QL STRIP: NEGATIVE
IMM GRANULOCYTES # BLD: 0 10E3/UL
IMM GRANULOCYTES NFR BLD: 0 %
INTERPRETATION ECG - MUSE: NORMAL
KETONES UR STRIP-MCNC: 10 MG/DL
LACTATE SERPL-SCNC: 2 MMOL/L (ref 0.7–2)
LEUKOCYTE ESTERASE UR QL STRIP: NEGATIVE
LIPASE SERPL-CCNC: 32 U/L (ref 13–60)
LYMPHOCYTES # BLD AUTO: 1.9 10E3/UL (ref 0.8–5.3)
LYMPHOCYTES NFR BLD AUTO: 26 %
MCH RBC QN AUTO: 27.2 PG (ref 26.5–33)
MCHC RBC AUTO-ENTMCNC: 33.5 G/DL (ref 31.5–36.5)
MCV RBC AUTO: 81 FL (ref 78–100)
MONOCYTES # BLD AUTO: 0.7 10E3/UL (ref 0–1.3)
MONOCYTES NFR BLD AUTO: 10 %
NEUTROPHILS # BLD AUTO: 4.5 10E3/UL (ref 1.6–8.3)
NEUTROPHILS NFR BLD AUTO: 61 %
NITRATE UR QL: NEGATIVE
NRBC # BLD AUTO: 0 10E3/UL
NRBC BLD AUTO-RTO: 0 /100
P AXIS - MUSE: 34 DEGREES
PH UR STRIP: 7.5 [PH] (ref 5–7)
PLATELET # BLD AUTO: 296 10E3/UL (ref 150–450)
POTASSIUM SERPL-SCNC: 2.7 MMOL/L (ref 3.4–5.3)
PR INTERVAL - MUSE: 140 MS
PROT SERPL-MCNC: 5.9 G/DL (ref 6.4–8.3)
QRS DURATION - MUSE: 92 MS
QT - MUSE: 396 MS
QTC - MUSE: 460 MS
R AXIS - MUSE: -27 DEGREES
RBC # BLD AUTO: 4.45 10E6/UL (ref 4.4–5.9)
RBC URINE: 1 /HPF
SODIUM SERPL-SCNC: 137 MMOL/L (ref 136–145)
SP GR UR STRIP: 1 (ref 1–1.03)
SYSTOLIC BLOOD PRESSURE - MUSE: NORMAL MMHG
T AXIS - MUSE: 20 DEGREES
UROBILINOGEN UR STRIP-MCNC: NORMAL MG/DL
VENTRICULAR RATE- MUSE: 81 BPM
WBC # BLD AUTO: 7.2 10E3/UL (ref 4–11)
WBC URINE: 0 /HPF

## 2023-04-16 PROCEDURE — 74177 CT ABD & PELVIS W/CONTRAST: CPT

## 2023-04-16 PROCEDURE — 250N000009 HC RX 250

## 2023-04-16 PROCEDURE — 80053 COMPREHEN METABOLIC PANEL: CPT | Performed by: EMERGENCY MEDICINE

## 2023-04-16 PROCEDURE — 258N000003 HC RX IP 258 OP 636: Performed by: EMERGENCY MEDICINE

## 2023-04-16 PROCEDURE — 82962 GLUCOSE BLOOD TEST: CPT

## 2023-04-16 PROCEDURE — 83690 ASSAY OF LIPASE: CPT | Performed by: EMERGENCY MEDICINE

## 2023-04-16 PROCEDURE — 74177 CT ABD & PELVIS W/CONTRAST: CPT | Mod: 26 | Performed by: RADIOLOGY

## 2023-04-16 PROCEDURE — 96374 THER/PROPH/DIAG INJ IV PUSH: CPT | Mod: 59 | Performed by: EMERGENCY MEDICINE

## 2023-04-16 PROCEDURE — 250N000011 HC RX IP 250 OP 636: Performed by: EMERGENCY MEDICINE

## 2023-04-16 PROCEDURE — 96361 HYDRATE IV INFUSION ADD-ON: CPT | Performed by: EMERGENCY MEDICINE

## 2023-04-16 PROCEDURE — 93005 ELECTROCARDIOGRAM TRACING: CPT | Performed by: EMERGENCY MEDICINE

## 2023-04-16 PROCEDURE — 99285 EMERGENCY DEPT VISIT HI MDM: CPT | Mod: 25 | Performed by: EMERGENCY MEDICINE

## 2023-04-16 PROCEDURE — 85004 AUTOMATED DIFF WBC COUNT: CPT | Performed by: EMERGENCY MEDICINE

## 2023-04-16 PROCEDURE — 250N000013 HC RX MED GY IP 250 OP 250 PS 637

## 2023-04-16 PROCEDURE — 120N000002 HC R&B MED SURG/OB UMMC

## 2023-04-16 PROCEDURE — 99233 SBSQ HOSP IP/OBS HIGH 50: CPT | Mod: GC | Performed by: SURGERY

## 2023-04-16 PROCEDURE — 93010 ELECTROCARDIOGRAM REPORT: CPT | Performed by: EMERGENCY MEDICINE

## 2023-04-16 PROCEDURE — 250N000009 HC RX 250: Performed by: EMERGENCY MEDICINE

## 2023-04-16 PROCEDURE — 81001 URINALYSIS AUTO W/SCOPE: CPT | Performed by: EMERGENCY MEDICINE

## 2023-04-16 PROCEDURE — 83605 ASSAY OF LACTIC ACID: CPT | Performed by: EMERGENCY MEDICINE

## 2023-04-16 PROCEDURE — 250N000013 HC RX MED GY IP 250 OP 250 PS 637: Performed by: EMERGENCY MEDICINE

## 2023-04-16 PROCEDURE — 36415 COLL VENOUS BLD VENIPUNCTURE: CPT | Performed by: EMERGENCY MEDICINE

## 2023-04-16 RX ORDER — IOPAMIDOL 755 MG/ML
150 INJECTION, SOLUTION INTRAVASCULAR ONCE
Status: CANCELLED | OUTPATIENT
Start: 2023-04-16 | End: 2023-04-16

## 2023-04-16 RX ORDER — ONDANSETRON 2 MG/ML
4 INJECTION INTRAMUSCULAR; INTRAVENOUS
Status: DISCONTINUED | OUTPATIENT
Start: 2023-04-16 | End: 2023-04-16 | Stop reason: HOSPADM

## 2023-04-16 RX ORDER — POTASSIUM CHLORIDE 1500 MG/1
60 TABLET, EXTENDED RELEASE ORAL ONCE
Status: COMPLETED | OUTPATIENT
Start: 2023-04-16 | End: 2023-04-16

## 2023-04-16 RX ORDER — NICOTINE POLACRILEX 4 MG
15-30 LOZENGE BUCCAL
Status: DISCONTINUED | OUTPATIENT
Start: 2023-04-16 | End: 2023-04-16 | Stop reason: HOSPADM

## 2023-04-16 RX ORDER — CALCIUM CARBONATE 500 MG/1
500 TABLET, CHEWABLE ORAL DAILY PRN
Status: DISCONTINUED | OUTPATIENT
Start: 2023-04-16 | End: 2023-04-16 | Stop reason: HOSPADM

## 2023-04-16 RX ORDER — IOPAMIDOL 755 MG/ML
89 INJECTION, SOLUTION INTRAVASCULAR ONCE
Status: COMPLETED | OUTPATIENT
Start: 2023-04-16 | End: 2023-04-16

## 2023-04-16 RX ORDER — DEXTROSE MONOHYDRATE 25 G/50ML
25-50 INJECTION, SOLUTION INTRAVENOUS
Status: DISCONTINUED | OUTPATIENT
Start: 2023-04-16 | End: 2023-04-16 | Stop reason: HOSPADM

## 2023-04-16 RX ORDER — HYDROMORPHONE HYDROCHLORIDE 1 MG/ML
0.5 INJECTION, SOLUTION INTRAMUSCULAR; INTRAVENOUS; SUBCUTANEOUS
Status: COMPLETED | OUTPATIENT
Start: 2023-04-16 | End: 2023-04-16

## 2023-04-16 RX ADMIN — SODIUM CHLORIDE 1000 ML: 9 INJECTION, SOLUTION INTRAVENOUS at 03:20

## 2023-04-16 RX ADMIN — IOPAMIDOL 89 ML: 755 INJECTION, SOLUTION INTRAVENOUS at 03:42

## 2023-04-16 RX ADMIN — SODIUM CHLORIDE, PRESERVATIVE FREE 72 ML: 5 INJECTION INTRAVENOUS at 03:44

## 2023-04-16 RX ADMIN — LIDOCAINE HYDROCHLORIDE 30 ML: 20 SOLUTION ORAL; TOPICAL at 09:38

## 2023-04-16 RX ADMIN — POTASSIUM CHLORIDE 60 MEQ: 1500 TABLET, EXTENDED RELEASE ORAL at 06:28

## 2023-04-16 RX ADMIN — CALCIUM CARBONATE (ANTACID) CHEW TAB 500 MG 500 MG: 500 CHEW TAB at 03:29

## 2023-04-16 RX ADMIN — HYDROMORPHONE HYDROCHLORIDE 0.5 MG: 1 INJECTION, SOLUTION INTRAMUSCULAR; INTRAVENOUS; SUBCUTANEOUS at 03:29

## 2023-04-16 ASSESSMENT — ACTIVITIES OF DAILY LIVING (ADL)
ADLS_ACUITY_SCORE: 37

## 2023-04-16 NOTE — PROGRESS NOTES
Pt notified this writer of wanting to leave. Paged team: team aware of pt leaving AMA. This writer went to go bring paperwork to pt and pt was already gone. Pt still had PIV in. Will try and call pt to educate on coming back to take IV out.

## 2023-04-16 NOTE — CONSULTS
"    [facility]    Consult Note - General Surgery Service  Date of Admission:  4/16/2023  Consult Requested by: Dr. Mauro  Reason for Consult: SBO    Assessment & Plan: Surgery   Andrew Collier is a 57 year old male with recurrent SBO recently admitted 3/28-3/31 now presenting with SBO 4/16/2023.     Admit to general surgery  NPO  NGT  Replete lytes to K>4, Mg>2, Phos>3       Drains: None     Code Status:   Full    Clinically Significant Risk Factors Present on Admission        # Hypokalemia: Lowest K = 2.7 mmol/L in last 2 days, will replace as needed   # Hypocalcemia: Lowest Ca = 8 mg/dL in last 2 days, will monitor and replace as appropriate           # Acute Respiratory Failure: Documented O2 saturation < 91%.  Continue supplemental oxygen as needed    # DMII: A1C = N/A within past 6 months    # Overweight: Estimated body mass index is 25.69 kg/m  as calculated from the following:    Height as of this encounter: 1.6 m (5' 3\").    Weight as of this encounter: 65.8 kg (145 lb).         The patient's care was discussed with the Attending Physician, Dr. Garcia.    Chantale Stahl MD  [facility]  Non-urgent messages: Securely message with [employer] (more info)  Text page via [facility] Paging/Directory     _______________________________________________________________  Chief Complaint   Abdominal pain    History is obtained from the patient    History of Present Illness   Andrew Collier is a 57 year old male with HIV, methamphetamine use disorder, hx of perforated appendicitis s/p appendectomy, recurrent bowel obstructions, most recently 3/28/3/31, who now presents with n/v similar to prior SBOs. He had epigastric abdominal pain and nausea that started yesterday after his last BM. Since the nausea and abd pain started, no BM or flatus. Hasn't vomited. Denies fevers/chills, SOB.     Prior obstructions have " primarily been managed nonoperatively, but he has required operative intervention in the past with lysis of adhesions.     Past Medical History    Past Medical History:   Diagnosis Date     Acute appendicitis with localized peritonitis 1/31/2018     AIDS (H)      Allergic rhinitis due to other allergen     DNS     Anal dysplasia      Chronic abdominal pain      CNS toxoplasmosis (H)      COVID-19 1/11/2022     Diabetes type 2, controlled (H)      GERD (gastroesophageal reflux disease)      Herpes zoster 9/23/2016     History of seizure      History of substance abuse (H)      HIV (human immunodeficiency virus infection) (H)      HLD (hyperlipidemia)      Lung nodules      Periungual wart      Pneumonia 1/6/2019     PTSD (post-traumatic stress disorder)      Sleep apnea     doesn't use CPAP       Past Surgical History   Past Surgical History:   Procedure Laterality Date     ANOSCOPY N/A 9/9/2020    Procedure: Exam Under Anesthesia, ANOSCOPY, fulgeration of rectal fissures with Rectal Biopsies;  Surgeon: Thanh Lundberg MD;  Location: [facility] OR     COLONOSCOPY Left 1/22/2016    Procedure: COMBINED COLONOSCOPY, SINGLE OR MULTIPLE BIOPSY/POLYPECTOMY BY BIOPSY;  Surgeon: Clark Saini MD;  Location: [facility] GI     ESOPHAGOSCOPY, GASTROSCOPY, DUODENOSCOPY (EGD), COMBINED N/A 6/6/2022    Procedure: ESOPHAGOGASTRODUODENOSCOPY, WITH BIOPSY;  Surgeon: Kecia Benítez MD;  Location: [facility] GI     HC EXPLORE UNDESC TESTIS,INGUIN/SCROTAL       LAPAROSCOPIC APPENDECTOMY N/A 1/31/2018    Procedure: LAPAROSCOPIC APPENDECTOMY;  LAPAROSCOPIC APPENDECTOMY;  Surgeon: Dawn Holt MD;  Location: [facility] OR     LAPAROSCOPY DIAGNOSTIC (GENERAL) N/A 7/26/2016    Procedure: LAPAROSCOPY DIAGNOSTIC (GENERAL);  Surgeon: Susannah Arriaga MD;  Location: [facility] OR     LAPAROSCOPY DIAGNOSTIC (GENERAL) N/A 4/16/2018    Procedure: LAPAROSCOPY DIAGNOSTIC (GENERAL);  Diagnostic laparoscopy and lysis of adhesions;  Surgeon: Prince Dowling,  MD;  Location: [facility] OR     OPTICAL TRACKING SYSTEM CRANIOTOMY, EXCISE TUMOR, COMBINED Left 4/10/2015    Procedure: COMBINED OPTICAL TRACKING SYSTEM CRANIOTOMY, EXCISE TUMOR;  Surgeon: Mirlande Colmenares MD;  Location: [facility] OR     REPAIR GAMEKEEPER'S THUMB Right 12/2/2016    Procedure: REPAIR LIGAMENT ULNAR COLLATERAL THUMB (GAMEKEEPER'S);  Surgeon: Evin Zamorano MD;  Location:  OR     [facility] NONSPECIFIC PROCEDURE      right forearm fracture       Prior to Admission Medications   Current Facility-Administered Medications   Medication     calcium carbonate (TUMS) chewable tablet 500 mg     ondansetron (ZOFRAN) injection 4 mg     potassium chloride ER (KLOR-CON M) CR tablet 60 mEq     Current Outpatient Medications   Medication Sig     acetaminophen (TYLENOL) 500 MG tablet Take 1-2 tablets (500-1,000 mg) by mouth every 6 hours as needed for mild pain     bictegravir-emtricitabine-tenofovir (BIKTARVY) -25 MG per tablet Take 1 tablet by mouth daily     blood glucose (NO BRAND SPECIFIED) lancets standard Use to test blood sugar 1 time daily or as directed.     blood glucose (NO BRAND SPECIFIED) test strip Use to test blood sugar 1 time daily or as directed.     blood glucose monitoring (NO BRAND SPECIFIED) meter device kit Use to test blood sugar 1 time daily or as directed.     buPROPion (WELLBUTRIN XL) 150 MG 24 hr tablet Take 1 tablet (150 mg) by mouth daily (Patient not taking: Reported on 3/29/2023)     famotidine (PEPCID) 40 MG tablet Take 1 tablet (40 mg) by mouth daily     glimepiride (AMARYL) 4 MG tablet Take 1 tablet (4 mg) by mouth 2 times daily (before meals) (Patient not taking: Reported on 3/29/2023)     ibuprofen (ADVIL/MOTRIN) 600 MG tablet Take 1 tablet (600 mg) by mouth every 6 hours as needed for moderate pain (4-6)     levofloxacin (LEVAQUIN) 500 MG tablet Take 1 tablet (500 mg) by mouth daily (Patient not taking: Reported on 3/29/2023)     metFORMIN (FORTAMET) 1000 MG 24 hr tablet Take 2  "tablets (2,000 mg) by mouth daily (with dinner) (Patient taking differently: Take 3,000 mg by mouth daily (with dinner))     sitagliptin (JANUVIA) 100 MG tablet Take 1 tablet (100 mg) by mouth daily (Patient not taking: Reported on 3/29/2023)     sulfamethoxazole-trimethoprim (BACTRIM DS) 800-160 MG tablet Take 1 tablet by mouth 2 times daily (Patient not taking: Reported on 3/29/2023)          Social History   I have reviewed this patient's social history and updated it with pertinent information if needed.  Social History     Tobacco Use     Smoking status: Some Days     Packs/day: 0.25     Years: 40.00     Pack years: 10.00     Types: Cigarettes     Smokeless tobacco: Former   Substance Use Topics     Alcohol use: No     Alcohol/week: 0.0 standard drinks of alcohol     Comment: Last etoh in 2007     Drug use: Not Currently     Types: Marijuana, Methamphetamines       Family History   I have reviewed this patient's family history and updated it with pertinent information if needed.  Family History   Problem Relation Age of Onset     Diabetes Brother      Diabetes Father      Alzheimer Disease Father      Unknown/Adopted Mother      Diabetes Paternal Grandfather      Cancer No family hx of         no skin cancer     Skin Cancer No family hx of         no famiy hx of skin cancer     Glaucoma No family hx of      Macular Degeneration No family hx of        Allergies   Allergies   Allergen Reactions     Fentanyl Blisters     Per pt, after taking this medication had blisters develope     Tylenol [Acetaminophen] Itching     Dulaglutide Rash     Insulin Lispro Rash     Patient reported     Penicillin V Other (See Comments) and Rash     Diffuse maculopapular rash + feels \"high\", per pt.         Physical Exam   Vital Signs: Temp: 97.8  F (36.6  C) Temp src: Oral BP: (!) 143/83 Pulse: 89   Resp: 20 SpO2: 100 %      Weight: 145 lbs 0 ozNo intake or output data in the 24 hours ending 04/16/23 0450    Gen: NAD, " fidgeting  HEENT: NCAT, sclera anicteric  CV: RRR  Resp: nonlabored respirations, comfortable on RA  Abd: soft, mildly distended, mildly ttp in LUQ, nonperitoneal  Ext: WWP w/o edema  Neuro: alert, no focal deficits  Skin: No rashes    Data     I have personally reviewed the following data over the past 24 hrs:    7.2  \   12.1 (L)   / 296     137 105 16.3 /  340 (H)   2.7 (L) 16 (L) 0.66 (L) \       ALT: 12 AST: 16 AP: 77 TBILI: 0.8   ALB: 3.6 TOT PROTEIN: 5.9 (L) LIPASE: 32       Procal: N/A CRP: N/A Lactic Acid: 2.0         Imaging results reviewed over the past 24 hrs:   Recent Results (from the past 24 hour(s))   CT Abdomen Pelvis w Contrast    Narrative    EXAM: CT ABDOMEN PELVIS W CONTRAST  LOCATION: [facility]  DATE/TIME: 4/16/2023 3:47 AM CDT    INDICATION: diffuse abd pain distension w  n v, c f sbo  COMPARISON: None.  TECHNIQUE: CT scan of the abdomen and pelvis was performed following injection of IV contrast. Multiplanar reformats were obtained. Dual energy subtraction reformats performed in the axial plane. Dose reduction techniques were used.  CONTRAST: 89 ml isovue 370     FINDINGS:   LOWER CHEST: Normal.    HEPATOBILIARY: No significant mass or bile duct dilatation.  4 mm calcified gallstone in the neck of the gallbladder. Gallbladder otherwise unremarkable.    PANCREAS: Normal.    SPLEEN: Normal.    ADRENAL GLANDS: Normal.    KIDNEYS/BLADDER: Normal.    BOWEL: Marked dilatation of the stomach and proximal small bowel into the left lower quadrant. There is an abrupt transition to decompressed bowel. Findings abdomen appearance most consistent with high-grade mechanical small bowel obstruction likely   secondary to adhesion. The appendix is not definitely identified but I see no CT evidence for appendicitis. Scattered colonic diverticula. Dual reformats and subtraction images demonstrate no evidence for GI hemorrhage.    LYMPH NODES:  Normal.    VASCULATURE: Unremarkable.    PELVIC ORGANS: Penile prosthesis with reservoir in the left lower pelvis.    MUSCULOSKELETAL: Normal.      Impression    IMPRESSION:   1.  Marked dilatation of the stomach and proximal small bowel into the left lower quadrant. There is an abrupt transition to decompressed bowel. Findings abdomen appearance most consistent with high-grade mechanical small bowel obstruction likely   secondary to adhesion.     2.  4 mm calcified gallstone in the neck of the gallbladder. Gallbladder otherwise unremarkable.

## 2023-04-16 NOTE — ED NOTES
Pt reporting heartburn pain and wants something to ease pain. Noticed pt does not have NG tube in place. Pt reported he had BM and does not want NG tube. Paged surgery team. Surgery team returned call and will come down to speak with pt.

## 2023-04-16 NOTE — ED TRIAGE NOTES
BIBA abdominal pain 10/10 after eating x 1 hr, pt vomited x 5 times and was given Zofran 4mg given in route 18G Left forearm, NS 500ml, , VSS per EMT.     Triage Assessment     Row Name 04/16/23 0150       Triage Assessment (Adult)    Airway WDL WDL       Respiratory WDL    Respiratory WDL WDL       Skin Circulation/Temperature WDL    Skin Circulation/Temperature WDL WDL       Cardiac WDL    Cardiac WDL WDL       Peripheral/Neurovascular WDL    Peripheral Neurovascular WDL WDL       Cognitive/Neuro/Behavioral WDL    Cognitive/Neuro/Behavioral WDL WDL       [place] Coma Scale    Best Eye Response 4-->(E4) spontaneous    Best Motor Response 6-->(M6) obeys commands    Best Verbal Response 5-->(V5) oriented    [place] Coma Scale Score 15

## 2023-04-16 NOTE — ED PROVIDER NOTES
ED Provider Note  [facility]      History     Chief Complaint   Patient presents with     Abdominal Pain     BIBA abdominal pain 10/10 after eating x 1 hr, pt vomited x 5 times and was given Zofran 4mg given in route 18G Left forearm, NS 500ml, , VSS per EMT.     HPI  Andrew Collier is a 55 year old male with a past medical history of toxoplasma encephalitis, HIV, pulmonary nodules, small bowel obstruction, GERD, type 2 diabetes, constipation, anxiety/depression, prior appendectomy, methamphetamine abuse, gastric outlet obstruction, homelessness, who presents to the ED for evaluation abdominal pain.  He is concerned for small bowel obstruction.  His pain feels similar to prior episodes.  His last bowel movement was approximately 3 hours ago but was very small.  Shortly after he developed diffuse abdominal pain and nausea and vomiting.  Was given Zofran in route which improved his vomiting.  He describes as 10 out of 10 abdominal pain throughout his entire abdomen.  No other aggravating/relieving factors.  Has been urinating without issue.  No fevers or chills.    Past Medical History  Past Medical History:   Diagnosis Date     Acute appendicitis with localized peritonitis 1/31/2018     AIDS (H)      Allergic rhinitis due to other allergen     DNS     Anal dysplasia      Chronic abdominal pain      CNS toxoplasmosis (H)      COVID-19 1/11/2022     Diabetes type 2, controlled (H)      GERD (gastroesophageal reflux disease)      Herpes zoster 9/23/2016     History of seizure      History of substance abuse (H)      HIV (human immunodeficiency virus infection) (H)      HLD (hyperlipidemia)      Lung nodules      Periungual wart      Pneumonia 1/6/2019     PTSD (post-traumatic stress disorder)      Sleep apnea     doesn't use CPAP     Past Surgical History:   Procedure Laterality Date     ANOSCOPY N/A 9/9/2020    Procedure: Exam Under Anesthesia, ANOSCOPY, fulgeration of rectal  fissures with Rectal Biopsies;  Surgeon: Thanh Lundberg MD;  Location: [facility] OR     COLONOSCOPY Left 1/22/2016    Procedure: COMBINED COLONOSCOPY, SINGLE OR MULTIPLE BIOPSY/POLYPECTOMY BY BIOPSY;  Surgeon: Clark Saini MD;  Location: [facility] GI     ESOPHAGOSCOPY, GASTROSCOPY, DUODENOSCOPY (EGD), COMBINED N/A 6/6/2022    Procedure: ESOPHAGOGASTRODUODENOSCOPY, WITH BIOPSY;  Surgeon: Kecia Benítez MD;  Location: [facility] GI     HC EXPLORE UNDESC TESTIS,INGUIN/SCROTAL       LAPAROSCOPIC APPENDECTOMY N/A 1/31/2018    Procedure: LAPAROSCOPIC APPENDECTOMY;  LAPAROSCOPIC APPENDECTOMY;  Surgeon: Dawn Holt MD;  Location: [facility]     LAPAROSCOPY DIAGNOSTIC (GENERAL) N/A 7/26/2016    Procedure: LAPAROSCOPY DIAGNOSTIC (GENERAL);  Surgeon: Susannah Arriaga MD;  Location: [facility] OR     LAPAROSCOPY DIAGNOSTIC (GENERAL) N/A 4/16/2018    Procedure: LAPAROSCOPY DIAGNOSTIC (GENERAL);  Diagnostic laparoscopy and lysis of adhesions;  Surgeon: Prince Dowling MD;  Location: [facility] OR     OPTICAL TRACKING SYSTEM CRANIOTOMY, EXCISE TUMOR, COMBINED Left 4/10/2015    Procedure: COMBINED OPTICAL TRACKING SYSTEM CRANIOTOMY, EXCISE TUMOR;  Surgeon: Mirlande Colmenares MD;  Location: [facility] OR     REPAIR GAMEKEEPER'S THUMB Right 12/2/2016    Procedure: REPAIR LIGAMENT ULNAR COLLATERAL THUMB (GAMEKEEPER'S);  Surgeon: Evin Zamorano MD;  Location: [facility] OR     ZZC NONSPECIFIC PROCEDURE      right forearm fracture     acetaminophen (TYLENOL) 500 MG tablet  bictegravir-emtricitabine-tenofovir (BIKTARVY) -25 MG per tablet  blood glucose (NO BRAND SPECIFIED) lancets standard  blood glucose (NO BRAND SPECIFIED) test strip  blood glucose monitoring (NO BRAND SPECIFIED) meter device kit  buPROPion (WELLBUTRIN XL) 150 MG 24 hr tablet  famotidine (PEPCID) 40 MG tablet  glimepiride (AMARYL) 4 MG tablet  ibuprofen (ADVIL/MOTRIN) 600 MG tablet  levofloxacin (LEVAQUIN) 500 MG tablet  metFORMIN (FORTAMET) 1000 MG 24 hr tablet  sitagliptin  "(JANUVIA) 100 MG tablet  sulfamethoxazole-trimethoprim (BACTRIM DS) 800-160 MG tablet      Allergies   Allergen Reactions     Fentanyl Blisters     Per pt, after taking this medication had blisters develope     Tylenol [Acetaminophen] Itching     Dulaglutide Rash     Insulin Lispro Rash     Patient reported     Penicillin V Other (See Comments) and Rash     Diffuse maculopapular rash + feels \"high\", per pt.      Family History  Family History   Problem Relation Age of Onset     Diabetes Brother      Diabetes Father      Alzheimer Disease Father      Unknown/Adopted Mother      Diabetes Paternal Grandfather      Cancer No family hx of         no skin cancer     Skin Cancer No family hx of         no famiy hx of skin cancer     Glaucoma No family hx of      Macular Degeneration No family hx of      Social History   Social History     Tobacco Use     Smoking status: Some Days     Packs/day: 0.25     Years: 40.00     Pack years: 10.00     Types: Cigarettes     Smokeless tobacco: Former   Substance Use Topics     Alcohol use: No     Alcohol/week: 0.0 standard drinks of alcohol     Comment: Last etoh in 2007     Drug use: Not Currently     Types: Marijuana, Methamphetamines         A medically appropriate review of systems was performed with pertinent positives and negatives noted in the HPI, and all other systems negative.    Physical Exam   BP: (!) 134/98  Pulse: 93  Temp: 97.8  F (36.6  C)  Resp: 20  Height: 160 cm (5' 3\")  Weight: 65.8 kg (145 lb)  Physical Exam  Vitals and nursing note reviewed.   Constitutional:       General: He is in acute distress.      Appearance: Normal appearance.   HENT:      Head: Normocephalic.      Nose: Nose normal.   Eyes:      Pupils: Pupils are equal, round, and reactive to light.   Cardiovascular:      Rate and Rhythm: Normal rate and regular rhythm.   Pulmonary:      Effort: Pulmonary effort is normal.   Abdominal:      General: There is no distension.      Comments: Diffuse " tenderness throughout the entire abdomen.  Mild distention.  Guarding present.  No rebound tenderness   Musculoskeletal:         General: No deformity. Normal range of motion.      Cervical back: Normal range of motion.   Skin:     General: Skin is warm.   Neurological:      Mental Status: He is alert and oriented to person, place, and time.   Psychiatric:         Mood and Affect: Mood normal.           ED Course, Procedures, & Data   ED Course Selections:        EKG Interpretation:      Interpreted by Kofi Mauro DO  Time reviewed: 0335  Symptoms at time of EKG: hypo K   Rhythm: normal sinus   Rate: normal  Axis: normal  Ectopy: none  Conduction: normal  ST Segments/ T Waves: No ST-T wave changes.  No U waves  Q Waves: none  Comparison to prior: Unchanged    Clinical Impression: normal EKG                 Results for orders placed or performed during the hospital encounter of 04/16/23   Comprehensive metabolic panel     Status: Abnormal   Result Value Ref Range    Sodium 137 136 - 145 mmol/L    Potassium 2.7 (L) 3.4 - 5.3 mmol/L    Chloride 105 98 - 107 mmol/L    Carbon Dioxide (CO2) 16 (L) 22 - 29 mmol/L    Anion Gap 16 (H) 7 - 15 mmol/L    Urea Nitrogen 16.3 8.0 - 23.0 mg/dL    Creatinine 0.66 (L) 0.67 - 1.17 mg/dL    Calcium 8.0 (L) 8.6 - 10.0 mg/dL    Glucose 340 (H) 70 - 99 mg/dL    Alkaline Phosphatase 77 40 - 129 U/L    AST 16 10 - 50 U/L    ALT 12 10 - 50 U/L    Protein Total 5.9 (L) 6.4 - 8.3 g/dL    Albumin 3.6 3.5 - 5.2 g/dL    Bilirubin Total 0.8 <=1.2 mg/dL    GFR Estimate >90 >60 mL/min/1.73m2   Lipase     Status: Normal   Result Value Ref Range    Lipase 32 13 - 60 U/L   Lactic acid whole blood     Status: Normal   Result Value Ref Range    Lactic Acid 2.0 0.7 - 2.0 mmol/L   CBC with platelets and differential     Status: Abnormal   Result Value Ref Range    WBC Count 7.2 4.0 - 11.0 10e3/uL    RBC Count 4.45 4.40 - 5.90 10e6/uL    Hemoglobin 12.1 (L) 13.3 - 17.7 g/dL    Hematocrit 36.1 (L) 40.0 -  53.0 %    MCV 81 78 - 100 fL    MCH 27.2 26.5 - 33.0 pg    MCHC 33.5 31.5 - 36.5 g/dL    RDW 13.6 10.0 - 15.0 %    Platelet Count 296 150 - 450 10e3/uL    % Neutrophils 61 %    % Lymphocytes 26 %    % Monocytes 10 %    % Eosinophils 2 %    % Basophils 1 %    % Immature Granulocytes 0 %    NRBCs per 100 WBC 0 <1 /100    Absolute Neutrophils 4.5 1.6 - 8.3 10e3/uL    Absolute Lymphocytes 1.9 0.8 - 5.3 10e3/uL    Absolute Monocytes 0.7 0.0 - 1.3 10e3/uL    Absolute Eosinophils 0.1 0.0 - 0.7 10e3/uL    Absolute Basophils 0.1 0.0 - 0.2 10e3/uL    Absolute Immature Granulocytes 0.0 <=0.4 10e3/uL    Absolute NRBCs 0.0 10e3/uL   CBC with platelets differential     Status: Abnormal    Narrative    The following orders were created for panel order CBC with platelets differential.  Procedure                               Abnormality         Status                     ---------                               -----------         ------                     CBC with platelets and d...[[id number]]  Abnormal            Final result                 Please view results for these tests on the individual orders.     Medications   ondansetron (ZOFRAN) injection 4 mg (has no administration in time range)   0.9% sodium chloride BOLUS (has no administration in time range)   HYDROmorphone (PF) (DILAUDID) injection 0.5 mg (has no administration in time range)     Labs Ordered and Resulted from Time of ED Arrival to Time of ED Departure   COMPREHENSIVE METABOLIC PANEL - Abnormal       Result Value    Sodium 137      Potassium 2.7 (*)     Chloride 105      Carbon Dioxide (CO2) 16 (*)     Anion Gap 16 (*)     Urea Nitrogen 16.3      Creatinine 0.66 (*)     Calcium 8.0 (*)     Glucose 340 (*)     Alkaline Phosphatase 77      AST 16      ALT 12      Protein Total 5.9 (*)     Albumin 3.6      Bilirubin Total 0.8      GFR Estimate >90     CBC WITH PLATELETS AND DIFFERENTIAL - Abnormal    WBC Count 7.2      RBC Count 4.45      Hemoglobin 12.1 (*)      Hematocrit 36.1 (*)     MCV 81      MCH 27.2      MCHC 33.5      RDW 13.6      Platelet Count 296      % Neutrophils 61      % Lymphocytes 26      % Monocytes 10      % Eosinophils 2      % Basophils 1      % Immature Granulocytes 0      NRBCs per 100 WBC 0      Absolute Neutrophils 4.5      Absolute Lymphocytes 1.9      Absolute Monocytes 0.7      Absolute Eosinophils 0.1      Absolute Basophils 0.1      Absolute Immature Granulocytes 0.0      Absolute NRBCs 0.0     LIPASE - Normal    Lipase 32     LACTIC ACID WHOLE BLOOD - Normal    Lactic Acid 2.0     ROUTINE [facility] WITH MICROSCOPIC REFLEX TO CULTURE     CT Abdomen Pelvis w Contrast    (Results Pending)          Critical care was not performed.     Medical Decision Making  The patient's presentation was of high complexity (a chronic illness severe exacerbation, progression, or side effect of treatment).    The patient's evaluation involved:  ordering and/or review of 3+ test(s) in this encounter (see separate area of note for details)  discussion of management or test interpretation with another health professional (see separate area of note for details)    The patient's management necessitated high risk (a decision regarding hospitalization).      Assessment & Plan    On arrival, patient appears uncomfortable due to the pain.  He has diffuse abdominal tenderness with mild distention.  No obvious peritonitis.  He is slightly hypertensive.  Otherwise has normal vital signs.  Was given dose of IV pain medication.    Labs reviewed.  Lactic acid is 2.0.  Lipase 32.  No leukocytosis.  No significant anemia.  Glucose is 340 which is baseline according to the patient.  No transaminitis or bilirubin elevation to suggest hepatobiliary pathology.  Normal renal function.  Calcium slightly decreased at 8.0.  Mild metabolic acidosis with bicarb of 16.  Potassium was 2.7, replaced with 60 oral potassium in the ED. EKG negative for U waves, QRS prolongation, or other  dysrhythmias.    CT scan shows recurrent high-grade bowel obstruction.  NG tube placed.  Surgery consulted.  Plan to admit to surgery service.          I have reviewed the nursing notes. I have reviewed the findings, diagnosis, plan and need for follow up with the patient.    New Prescriptions    No medications on file       Final diagnoses:   None       Kofi Mauro DO  [facility] EMERGENCY DEPARTMENT  4/16/2023     Kofi Mauro DO  04/17/23 0059

## 2023-04-16 NOTE — PROGRESS NOTES
General Surgery Update  Patient Leaving Against Medical Advice  04/16/23    Paged that patient desired to leave against medical advice. I saw the patient at bedside at 11:00 A.M. accompanied by a nurse witness. I informed the patient that given his hyperglycemia to the 400s, my medical advice was for him to stay in the hospital and receive treatment. I could not authorize his discharge as it would present safety risks to him.     I discussed the risks of leaving the hospital against this medical advice, and the patient adamantly insisted that he understood the risks and would leave the hospital immediately. He refused to sign the AMA form offered to him. The nurse witnessed our discussion. The patient was encouraged to present again for care.    The witnessing nurse removed the IV from the patient's arm before the patient discharged.    The witnessing nurse and I both signed and filed the AMA form.    Discussed with Surgery Staff, Dr. Radha M.D.    Lucy Chen M.D.  General Surgery Resident PGY1  [facility]      Surgery CDI Query Physician Clarification  In response to a CDI query received 4/20/23:     - Acute Respiratory Failure considered and ruled out. Patient did not demonstrate clinical indicators of acute respiratory failure during this admission and was breathing comfortably on room air.    Lucy Chen M.D.  General Surgery Resident PGY1  [facility]

## 2023-04-18 ENCOUNTER — HOSPITAL ENCOUNTER (INPATIENT)
Facility: CLINIC | Age: 58
LOS: 1 days | Discharge: LEFT BEFORE DISCHARGE | End: 2023-04-19
Attending: EMERGENCY MEDICINE | Admitting: SURGERY
Payer: COMMERCIAL

## 2023-04-18 ENCOUNTER — APPOINTMENT (OUTPATIENT)
Dept: GENERAL RADIOLOGY | Facility: CLINIC | Age: 58
End: 2023-04-18
Attending: EMERGENCY MEDICINE
Payer: COMMERCIAL

## 2023-04-18 DIAGNOSIS — Z11.52 ENCOUNTER FOR SCREENING LABORATORY TESTING FOR SEVERE ACUTE RESPIRATORY SYNDROME CORONAVIRUS 2 (SARS-COV-2): ICD-10-CM

## 2023-04-18 DIAGNOSIS — K56.609 INTESTINAL OBSTRUCTION, UNSPECIFIED CAUSE, UNSPECIFIED WHETHER PARTIAL OR COMPLETE (H): ICD-10-CM

## 2023-04-18 LAB
ALBUMIN SERPL BCG-MCNC: 4.3 G/DL (ref 3.5–5.2)
ALP SERPL-CCNC: 111 U/L (ref 40–129)
ALT SERPL W P-5'-P-CCNC: 8 U/L (ref 10–50)
ANION GAP SERPL CALCULATED.3IONS-SCNC: 25 MMOL/L (ref 7–15)
AST SERPL W P-5'-P-CCNC: 27 U/L (ref 10–50)
BASOPHILS # BLD AUTO: 0 10E3/UL (ref 0–0.2)
BASOPHILS NFR BLD AUTO: 0 %
BILIRUB SERPL-MCNC: 1.1 MG/DL
BUN SERPL-MCNC: 11.8 MG/DL (ref 8–23)
CALCIUM SERPL-MCNC: 10 MG/DL (ref 8.6–10)
CHLORIDE SERPL-SCNC: 98 MMOL/L (ref 98–107)
CREAT SERPL-MCNC: 0.77 MG/DL (ref 0.67–1.17)
CREAT SERPL-MCNC: 0.77 MG/DL (ref 0.67–1.17)
DEPRECATED HCO3 PLAS-SCNC: 11 MMOL/L (ref 22–29)
EOSINOPHIL # BLD AUTO: 0 10E3/UL (ref 0–0.7)
EOSINOPHIL NFR BLD AUTO: 0 %
ERYTHROCYTE [DISTWIDTH] IN BLOOD BY AUTOMATED COUNT: 13.4 % (ref 10–15)
GFR SERPL CREATININE-BSD FRML MDRD: >90 ML/MIN/1.73M2
GFR SERPL CREATININE-BSD FRML MDRD: >90 ML/MIN/1.73M2
GLUCOSE BLDC GLUCOMTR-MCNC: 223 MG/DL (ref 70–99)
GLUCOSE BLDC GLUCOMTR-MCNC: 235 MG/DL (ref 70–99)
GLUCOSE BLDC GLUCOMTR-MCNC: 241 MG/DL (ref 70–99)
GLUCOSE SERPL-MCNC: 307 MG/DL (ref 70–99)
HBA1C MFR BLD: 11.5 %
HCT VFR BLD AUTO: 44.2 % (ref 40–53)
HGB BLD-MCNC: 13.9 G/DL (ref 13.3–17.7)
IMM GRANULOCYTES # BLD: 0.1 10E3/UL
IMM GRANULOCYTES NFR BLD: 1 %
LACTATE SERPL-SCNC: 0.8 MMOL/L (ref 0.7–2)
LIPASE SERPL-CCNC: 33 U/L (ref 13–60)
LYMPHOCYTES # BLD AUTO: 1.9 10E3/UL (ref 0.8–5.3)
LYMPHOCYTES NFR BLD AUTO: 21 %
MCH RBC QN AUTO: 27.3 PG (ref 26.5–33)
MCHC RBC AUTO-ENTMCNC: 31.4 G/DL (ref 31.5–36.5)
MCV RBC AUTO: 87 FL (ref 78–100)
MONOCYTES # BLD AUTO: 0.5 10E3/UL (ref 0–1.3)
MONOCYTES NFR BLD AUTO: 5 %
NEUTROPHILS # BLD AUTO: 6.6 10E3/UL (ref 1.6–8.3)
NEUTROPHILS NFR BLD AUTO: 73 %
NRBC # BLD AUTO: 0 10E3/UL
NRBC BLD AUTO-RTO: 0 /100
PLATELET # BLD AUTO: 331 10E3/UL (ref 150–450)
POTASSIUM SERPL-SCNC: 4.3 MMOL/L (ref 3.4–5.3)
PROT SERPL-MCNC: 7.7 G/DL (ref 6.4–8.3)
RBC # BLD AUTO: 5.1 10E6/UL (ref 4.4–5.9)
SARS-COV-2 RNA RESP QL NAA+PROBE: NEGATIVE
SODIUM SERPL-SCNC: 134 MMOL/L (ref 136–145)
WBC # BLD AUTO: 9.1 10E3/UL (ref 4–11)

## 2023-04-18 PROCEDURE — 74019 RADEX ABDOMEN 2 VIEWS: CPT | Mod: 26 | Performed by: RADIOLOGY

## 2023-04-18 PROCEDURE — 83036 HEMOGLOBIN GLYCOSYLATED A1C: CPT

## 2023-04-18 PROCEDURE — 99221 1ST HOSP IP/OBS SF/LOW 40: CPT | Mod: AI | Performed by: SURGERY

## 2023-04-18 PROCEDURE — 82962 GLUCOSE BLOOD TEST: CPT

## 2023-04-18 PROCEDURE — 83605 ASSAY OF LACTIC ACID: CPT | Performed by: EMERGENCY MEDICINE

## 2023-04-18 PROCEDURE — 96375 TX/PRO/DX INJ NEW DRUG ADDON: CPT | Performed by: EMERGENCY MEDICINE

## 2023-04-18 PROCEDURE — 258N000003 HC RX IP 258 OP 636

## 2023-04-18 PROCEDURE — 80053 COMPREHEN METABOLIC PANEL: CPT | Performed by: EMERGENCY MEDICINE

## 2023-04-18 PROCEDURE — 250N000012 HC RX MED GY IP 250 OP 636 PS 637

## 2023-04-18 PROCEDURE — 36415 COLL VENOUS BLD VENIPUNCTURE: CPT | Performed by: EMERGENCY MEDICINE

## 2023-04-18 PROCEDURE — 74019 RADEX ABDOMEN 2 VIEWS: CPT

## 2023-04-18 PROCEDURE — 999N000065 XR ABDOMEN PORT 1 VIEW

## 2023-04-18 PROCEDURE — 96374 THER/PROPH/DIAG INJ IV PUSH: CPT | Performed by: EMERGENCY MEDICINE

## 2023-04-18 PROCEDURE — 250N000011 HC RX IP 250 OP 636: Performed by: EMERGENCY MEDICINE

## 2023-04-18 PROCEDURE — 74018 RADEX ABDOMEN 1 VIEW: CPT | Mod: 26 | Performed by: RADIOLOGY

## 2023-04-18 PROCEDURE — 120N000002 HC R&B MED SURG/OB UMMC

## 2023-04-18 PROCEDURE — C9803 HOPD COVID-19 SPEC COLLECT: HCPCS | Performed by: EMERGENCY MEDICINE

## 2023-04-18 PROCEDURE — 250N000011 HC RX IP 250 OP 636

## 2023-04-18 PROCEDURE — 36415 COLL VENOUS BLD VENIPUNCTURE: CPT

## 2023-04-18 PROCEDURE — U0005 INFEC AGEN DETEC AMPLI PROBE: HCPCS | Performed by: EMERGENCY MEDICINE

## 2023-04-18 PROCEDURE — 99285 EMERGENCY DEPT VISIT HI MDM: CPT | Performed by: EMERGENCY MEDICINE

## 2023-04-18 PROCEDURE — 250N000013 HC RX MED GY IP 250 OP 250 PS 637

## 2023-04-18 PROCEDURE — 85025 COMPLETE CBC W/AUTO DIFF WBC: CPT | Performed by: EMERGENCY MEDICINE

## 2023-04-18 PROCEDURE — 99285 EMERGENCY DEPT VISIT HI MDM: CPT | Mod: 25 | Performed by: EMERGENCY MEDICINE

## 2023-04-18 PROCEDURE — 83690 ASSAY OF LIPASE: CPT | Performed by: EMERGENCY MEDICINE

## 2023-04-18 PROCEDURE — 96376 TX/PRO/DX INJ SAME DRUG ADON: CPT | Performed by: EMERGENCY MEDICINE

## 2023-04-18 RX ORDER — ONDANSETRON 2 MG/ML
4 INJECTION INTRAMUSCULAR; INTRAVENOUS ONCE
Status: COMPLETED | OUTPATIENT
Start: 2023-04-18 | End: 2023-04-18

## 2023-04-18 RX ORDER — PROCHLORPERAZINE MALEATE 10 MG
10 TABLET ORAL EVERY 6 HOURS PRN
Status: DISCONTINUED | OUTPATIENT
Start: 2023-04-18 | End: 2023-04-19 | Stop reason: HOSPADM

## 2023-04-18 RX ORDER — ENOXAPARIN SODIUM 100 MG/ML
40 INJECTION SUBCUTANEOUS EVERY 24 HOURS
Status: DISCONTINUED | OUTPATIENT
Start: 2023-04-18 | End: 2023-04-19 | Stop reason: HOSPADM

## 2023-04-18 RX ORDER — LIDOCAINE 40 MG/G
CREAM TOPICAL
Status: DISCONTINUED | OUTPATIENT
Start: 2023-04-18 | End: 2023-04-19 | Stop reason: HOSPADM

## 2023-04-18 RX ORDER — PROCHLORPERAZINE 25 MG
25 SUPPOSITORY, RECTAL RECTAL EVERY 12 HOURS PRN
Status: DISCONTINUED | OUTPATIENT
Start: 2023-04-18 | End: 2023-04-19 | Stop reason: HOSPADM

## 2023-04-18 RX ORDER — KETOROLAC TROMETHAMINE 15 MG/ML
15 INJECTION, SOLUTION INTRAMUSCULAR; INTRAVENOUS EVERY 6 HOURS PRN
Status: DISCONTINUED | OUTPATIENT
Start: 2023-04-18 | End: 2023-04-19 | Stop reason: HOSPADM

## 2023-04-18 RX ORDER — ONDANSETRON 2 MG/ML
4 INJECTION INTRAMUSCULAR; INTRAVENOUS EVERY 6 HOURS PRN
Status: DISCONTINUED | OUTPATIENT
Start: 2023-04-18 | End: 2023-04-19 | Stop reason: HOSPADM

## 2023-04-18 RX ORDER — DEXTROSE MONOHYDRATE 25 G/50ML
25-50 INJECTION, SOLUTION INTRAVENOUS
Status: DISCONTINUED | OUTPATIENT
Start: 2023-04-18 | End: 2023-04-19 | Stop reason: HOSPADM

## 2023-04-18 RX ORDER — DEXTROSE MONOHYDRATE, SODIUM CHLORIDE, AND POTASSIUM CHLORIDE 50; 1.49; 4.5 G/1000ML; G/1000ML; G/1000ML
INJECTION, SOLUTION INTRAVENOUS CONTINUOUS
Status: DISCONTINUED | OUTPATIENT
Start: 2023-04-18 | End: 2023-04-19

## 2023-04-18 RX ORDER — HYDROMORPHONE HYDROCHLORIDE 1 MG/ML
0.5 INJECTION, SOLUTION INTRAMUSCULAR; INTRAVENOUS; SUBCUTANEOUS
Status: DISCONTINUED | OUTPATIENT
Start: 2023-04-18 | End: 2023-04-18

## 2023-04-18 RX ORDER — NICOTINE POLACRILEX 4 MG
15-30 LOZENGE BUCCAL
Status: DISCONTINUED | OUTPATIENT
Start: 2023-04-18 | End: 2023-04-19 | Stop reason: HOSPADM

## 2023-04-18 RX ORDER — ONDANSETRON 4 MG/1
4 TABLET, ORALLY DISINTEGRATING ORAL EVERY 6 HOURS PRN
Status: DISCONTINUED | OUTPATIENT
Start: 2023-04-18 | End: 2023-04-19 | Stop reason: HOSPADM

## 2023-04-18 RX ORDER — HYDROMORPHONE HCL IN WATER/PF 6 MG/30 ML
0.2 PATIENT CONTROLLED ANALGESIA SYRINGE INTRAVENOUS
Status: DISCONTINUED | OUTPATIENT
Start: 2023-04-18 | End: 2023-04-19 | Stop reason: HOSPADM

## 2023-04-18 RX ORDER — HYDROMORPHONE HYDROCHLORIDE 1 MG/ML
0.5 INJECTION, SOLUTION INTRAMUSCULAR; INTRAVENOUS; SUBCUTANEOUS
Status: COMPLETED | OUTPATIENT
Start: 2023-04-18 | End: 2023-04-18

## 2023-04-18 RX ADMIN — ONDANSETRON 4 MG: 2 INJECTION INTRAMUSCULAR; INTRAVENOUS at 06:56

## 2023-04-18 RX ADMIN — HYDROMORPHONE HYDROCHLORIDE 0.5 MG: 1 INJECTION, SOLUTION INTRAMUSCULAR; INTRAVENOUS; SUBCUTANEOUS at 10:18

## 2023-04-18 RX ADMIN — BICTEGRAVIR SODIUM, EMTRICITABINE, AND TENOFOVIR ALAFENAMIDE FUMARATE 1 TABLET: 50; 200; 25 TABLET ORAL at 16:44

## 2023-04-18 RX ADMIN — HYDROMORPHONE HYDROCHLORIDE 0.5 MG: 1 INJECTION, SOLUTION INTRAMUSCULAR; INTRAVENOUS; SUBCUTANEOUS at 06:57

## 2023-04-18 RX ADMIN — INSULIN ASPART 3 UNITS: 100 INJECTION, SOLUTION INTRAVENOUS; SUBCUTANEOUS at 19:27

## 2023-04-18 RX ADMIN — KETOROLAC TROMETHAMINE 15 MG: 15 INJECTION, SOLUTION INTRAMUSCULAR; INTRAVENOUS at 16:46

## 2023-04-18 RX ADMIN — FAMOTIDINE 20 MG: 10 INJECTION, SOLUTION INTRAVENOUS at 16:13

## 2023-04-18 RX ADMIN — POTASSIUM CHLORIDE, DEXTROSE MONOHYDRATE AND SODIUM CHLORIDE: 150; 5; 450 INJECTION, SOLUTION INTRAVENOUS at 16:11

## 2023-04-18 RX ADMIN — INSULIN ASPART 2 UNITS: 100 INJECTION, SOLUTION INTRAVENOUS; SUBCUTANEOUS at 16:51

## 2023-04-18 RX ADMIN — INSULIN ASPART 2 UNITS: 100 INJECTION, SOLUTION INTRAVENOUS; SUBCUTANEOUS at 23:43

## 2023-04-18 RX ADMIN — FAMOTIDINE 20 MG: 10 INJECTION, SOLUTION INTRAVENOUS at 09:57

## 2023-04-18 RX ADMIN — ENOXAPARIN SODIUM 40 MG: 40 INJECTION SUBCUTANEOUS at 16:16

## 2023-04-18 ASSESSMENT — ACTIVITIES OF DAILY LIVING (ADL)
ADLS_ACUITY_SCORE: 37

## 2023-04-18 NOTE — ED PROVIDER NOTES
ED Provider Note  [facility]      History     Chief Complaint   Patient presents with     Abdominal Pain     HPI  Andrew Collier is a 57 year old male who presents to the ED for evaluation of abdominal pain.  He was seen in the ED 2 days ago.  CT scan at that time shows a high-grade bowel obstruction.  He had refused NG tube at that time.  He was admitted to the surgery service.  Upon review of old records, appears that patient ultimately left AGAINST MEDICAL ADVICE from the emergency department.  He states that he has had continued abdominal pain since that time.  He has had bowel movements but still feels distended and has pain diffusely throughout his entire abdomen.  Also reports nausea without vomiting.  No fevers or chills.    Past Medical History  Past Medical History:   Diagnosis Date     Acute appendicitis with localized peritonitis 1/31/2018     AIDS (H)      Allergic rhinitis due to other allergen     DNS     Anal dysplasia      Chronic abdominal pain      CNS toxoplasmosis (H)      COVID-19 1/11/2022     Diabetes type 2, controlled (H)      GERD (gastroesophageal reflux disease)      Herpes zoster 9/23/2016     History of seizure      History of substance abuse (H)      HIV (human immunodeficiency virus infection) (H)      HLD (hyperlipidemia)      Lung nodules      Periungual wart      Pneumonia 1/6/2019     PTSD (post-traumatic stress disorder)      Sleep apnea     doesn't use CPAP     Past Surgical History:   Procedure Laterality Date     ANOSCOPY N/A 9/9/2020    Procedure: Exam Under Anesthesia, ANOSCOPY, fulgeration of rectal fissures with Rectal Biopsies;  Surgeon: Thanh Lundberg MD;  Location: [facility] OR     COLONOSCOPY Left 1/22/2016    Procedure: COMBINED COLONOSCOPY, SINGLE OR MULTIPLE BIOPSY/POLYPECTOMY BY BIOPSY;  Surgeon: Clark Saini MD;  Location:  GI     ESOPHAGOSCOPY, GASTROSCOPY, DUODENOSCOPY (EGD), COMBINED N/A 6/6/2022    Procedure:  ESOPHAGOGASTRODUODENOSCOPY, WITH BIOPSY;  Surgeon: Kecia Benítez MD;  Location: [facility] GI     HC EXPLORE UNDESC TESTIS,INGUIN/SCROTAL       LAPAROSCOPIC APPENDECTOMY N/A 1/31/2018    Procedure: LAPAROSCOPIC APPENDECTOMY;  LAPAROSCOPIC APPENDECTOMY;  Surgeon: Dawn Holt MD;  Location: [facility] OR     LAPAROSCOPY DIAGNOSTIC (GENERAL) N/A 7/26/2016    Procedure: LAPAROSCOPY DIAGNOSTIC (GENERAL);  Surgeon: Susannah Arriaga MD;  Location: [facility] OR     LAPAROSCOPY DIAGNOSTIC (GENERAL) N/A 4/16/2018    Procedure: LAPAROSCOPY DIAGNOSTIC (GENERAL);  Diagnostic laparoscopy and lysis of adhesions;  Surgeon: Prince Dowling MD;  Location: [facility] OR     OPTICAL TRACKING SYSTEM CRANIOTOMY, EXCISE TUMOR, COMBINED Left 4/10/2015    Procedure: COMBINED OPTICAL TRACKING SYSTEM CRANIOTOMY, EXCISE TUMOR;  Surgeon: Mirlande Colmenares MD;  Location: [facility] OR     REPAIR GAMEKEEPER'S THUMB Right 12/2/2016    Procedure: REPAIR LIGAMENT ULNAR COLLATERAL THUMB (GAMEKEEPER'S);  Surgeon: Evin Zamorano MD;  Location: [facility] OR     ZZC NONSPECIFIC PROCEDURE      right forearm fracture     acetaminophen (TYLENOL) 500 MG tablet  bictegravir-emtricitabine-tenofovir (BIKTARVY) -25 MG per tablet  blood glucose (NO BRAND SPECIFIED) lancets standard  blood glucose (NO BRAND SPECIFIED) test strip  blood glucose monitoring (NO BRAND SPECIFIED) meter device kit  buPROPion (WELLBUTRIN XL) 150 MG 24 hr tablet  famotidine (PEPCID) 40 MG tablet  glimepiride (AMARYL) 4 MG tablet  ibuprofen (ADVIL/MOTRIN) 600 MG tablet  levofloxacin (LEVAQUIN) 500 MG tablet  metFORMIN (FORTAMET) 1000 MG 24 hr tablet  sitagliptin (JANUVIA) 100 MG tablet  sulfamethoxazole-trimethoprim (BACTRIM DS) 800-160 MG tablet      Allergies   Allergen Reactions     Fentanyl Blisters     Per pt, after taking this medication had blisters develope     Tylenol [Acetaminophen] Itching     Dulaglutide Rash     Insulin Lispro Rash     Patient reported     Penicillin V Other  "(See Comments) and Rash     Diffuse maculopapular rash + feels \"high\", per pt.      Family History  Family History   Problem Relation Age of Onset     Diabetes Brother      Diabetes Father      Alzheimer Disease Father      Unknown/Adopted Mother      Diabetes Paternal Grandfather      Cancer No family hx of         no skin cancer     Skin Cancer No family hx of         no famiy hx of skin cancer     Glaucoma No family hx of      Macular Degeneration No family hx of      Social History   Social History     Tobacco Use     Smoking status: Some Days     Packs/day: 0.25     Years: 40.00     Pack years: 10.00     Types: Cigarettes     Smokeless tobacco: Former   Substance Use Topics     Alcohol use: No     Alcohol/week: 0.0 standard drinks of alcohol     Comment: Last etoh in 2007     Drug use: Not Currently     Types: Marijuana, Methamphetamines         A medically appropriate review of systems was performed with pertinent positives and negatives noted in the HPI, and all other systems negative.    Physical Exam   BP: (!) 154/88  Pulse: 84  Temp: 97.8  F (36.6  C)  Resp: 16  SpO2: 100 %  Physical Exam  Vitals and nursing note reviewed.   Constitutional:       General: He is not in acute distress.     Appearance: Normal appearance.   HENT:      Head: Normocephalic.      Nose: Nose normal.   Eyes:      Pupils: Pupils are equal, round, and reactive to light.   Cardiovascular:      Rate and Rhythm: Normal rate and regular rhythm.   Pulmonary:      Effort: Pulmonary effort is normal.   Abdominal:      General: There is no distension.   Musculoskeletal:         General: No deformity. Normal range of motion.      Cervical back: Normal range of motion.   Skin:     General: Skin is warm.   Neurological:      Mental Status: He is alert and oriented to person, place, and time.   Psychiatric:         Mood and Affect: Mood normal.           ED Course, Procedures, & Data        Results for orders placed or performed during the " hospital encounter of 04/18/23   XR Abdomen 2 Views     Status: None    Narrative    Exam: XR ABDOMEN 2 VIEWS 4/18/2023 8:31 AM    Indication: Diffuse abd pain, high grade bowel obstruction on CT 2  days ago    Comparison: 4/16/2023    Findings:   Upright and supine AP views of the abdomen were obtained. Continued  paucity of small bowel gas, similar to 4/16/2023. No new air-filled  dilated loops of bowel. There is continued distention of the stomach,  unchanged. The previously seen fluid distended loops of small bowel  are not well appreciated on radiograph. No pneumatosis, portal venous  gas, or intra-abdominal free air appreciated. Small stool burden.  Stable appearance of the penile prosthesis and reservoir. Mild streaky  retrocardiac atelectasis. No acute osseous abnormalities.        Impression    Impression:   Findings are not significantly changed compared to CT 4/16/2023  accounting for differences in technique.    MARY ROMERO MD         SYSTEM ID:  [id number]   XR Abdomen Port 1 View     Status: None    Narrative    Exam: XR ABDOMEN PORT 1 VIEW 4/18/2023 12:24 PM    Indication: NG placement    Comparison: Same day abdominal radiograph. CT abdomen 4/16/2023    Findings:   Single supine frontal radiograph of the abdomen. NG tube tip and  sidehole project over the stomach. Mild gaseous distention of small  bowel loops in the upper abdomen. Decreased gaseous distention of the  stomach. No pneumatosis or portal venous gas. Lungs are clear. No  acute osseous abnormality.        Impression    Impression:   NG tube tip and sidehole project over the stomach.    I have personally reviewed the examination and initial interpretation  and I agree with the findings.    MARY ROMERO MD         SYSTEM ID:  [id number]   Comprehensive metabolic panel     Status: Abnormal   Result Value Ref Range    Sodium 134 (L) 136 - 145 mmol/L    Potassium 4.3 3.4 - 5.3 mmol/L    Chloride 98 98 - 107 mmol/L    Carbon Dioxide (CO2) 11  (L) 22 - 29 mmol/L    Anion Gap 25 (H) 7 - 15 mmol/L    Urea Nitrogen 11.8 8.0 - 23.0 mg/dL    Creatinine 0.77 0.67 - 1.17 mg/dL    Calcium 10.0 8.6 - 10.0 mg/dL    Glucose 307 (H) 70 - 99 mg/dL    Alkaline Phosphatase 111 40 - 129 U/L    AST 27 10 - 50 U/L    ALT 8 (L) 10 - 50 U/L    Protein Total 7.7 6.4 - 8.3 g/dL    Albumin 4.3 3.5 - 5.2 g/dL    Bilirubin Total 1.1 <=1.2 mg/dL    GFR Estimate >90 >60 mL/min/1.73m2   Lipase     Status: Normal   Result Value Ref Range    Lipase 33 13 - 60 U/L   CBC with platelets and differential     Status: Abnormal   Result Value Ref Range    WBC Count 9.1 4.0 - 11.0 10e3/uL    RBC Count 5.10 4.40 - 5.90 10e6/uL    Hemoglobin 13.9 13.3 - 17.7 g/dL    Hematocrit 44.2 40.0 - 53.0 %    MCV 87 78 - 100 fL    MCH 27.3 26.5 - 33.0 pg    MCHC 31.4 (L) 31.5 - 36.5 g/dL    RDW 13.4 10.0 - 15.0 %    Platelet Count 331 150 - 450 10e3/uL    % Neutrophils 73 %    % Lymphocytes 21 %    % Monocytes 5 %    % Eosinophils 0 %    % Basophils 0 %    % Immature Granulocytes 1 %    NRBCs per 100 WBC 0 <1 /100    Absolute Neutrophils 6.6 1.6 - 8.3 10e3/uL    Absolute Lymphocytes 1.9 0.8 - 5.3 10e3/uL    Absolute Monocytes 0.5 0.0 - 1.3 10e3/uL    Absolute Eosinophils 0.0 0.0 - 0.7 10e3/uL    Absolute Basophils 0.0 0.0 - 0.2 10e3/uL    Absolute Immature Granulocytes 0.1 <=0.4 10e3/uL    Absolute NRBCs 0.0 10e3/uL   Lactic acid whole blood     Status: Normal   Result Value Ref Range    Lactic Acid 0.8 0.7 - 2.0 mmol/L   Asymptomatic COVID-19 Virus (Coronavirus) by PCR Nasopharyngeal     Status: Normal    Specimen: Nasopharyngeal; Swab   Result Value Ref Range    SARS CoV2 PCR Negative Negative    Narrative    Testing was performed using the Xpert Xpress SARS-CoV-2 Assay on the Cepheid Gene-Xpert Instrument Systems. Additional information about this Emergency Use Authorization (EUA) assay can be found via the Lab Guide. This test should be ordered for the detection of SARS-CoV-2 in individuals who meet  SARS-CoV-2 clinical and/or epidemiological criteria as well as from individuals without symptoms or other reasons to suspect COVID-19. Test performance for asymptomatic patients has only been established in anterior nasal swab specimens. This test is for in vitro diagnostic use under the FDA EUA for laboratories certified under CLIA to perform high complexity testing. This test has not been FDA cleared or approved. A negative result does not rule out the presence of PCR inhibitors in the specimen or target RNA concentration below the limit of detection for the assay. The possibility of a false negative should be considered if the patient's recent exposure or clinical presentation suggests COVID-19. This test was validated by [facility]. These Laboratories are certified under the Clinical Laboratory Improvement Amendments (CLIA) as qualified to perform high complexity testing.     Creatinine     Status: Normal   Result Value Ref Range    Creatinine 0.77 0.67 - 1.17 mg/dL    GFR Estimate >90 >60 mL/min/1.73m2   Glucose by meter     Status: Abnormal   Result Value Ref Range    GLUCOSE BY METER POCT 223 (H) 70 - 99 mg/dL   Hemoglobin A1c     Status: Abnormal   Result Value Ref Range    Hemoglobin A1C 11.5 (H) <5.7 %   Glucose by meter     Status: Abnormal   Result Value Ref Range    GLUCOSE BY METER POCT 241 (H) 70 - 99 mg/dL   Glucose by meter     Status: Abnormal   Result Value Ref Range    GLUCOSE BY METER POCT 235 (H) 70 - 99 mg/dL   CBC with platelets differential     Status: Abnormal    Narrative    The following orders were created for panel order CBC with platelets differential.  Procedure                               Abnormality         Status                     ---------                               -----------         ------                     CBC with platelets and d...[[id number]]  Abnormal            Final result                 Please view results for these tests on the individual  orders.     Medications   bictegravir-emtricitabine-tenofovir (BIKTARVY) -25 MG per tablet 1 tablet (1 tablet Oral $Given 4/18/23 1644)   lidocaine 1 % 0.1-1 mL (has no administration in time range)   lidocaine (LMX4) cream (has no administration in time range)   sodium chloride (PF) 0.9% PF flush 3 mL (3 mLs Intracatheter Not Given 4/18/23 2347)   sodium chloride (PF) 0.9% PF flush 3 mL (has no administration in time range)   melatonin tablet 1 mg (has no administration in time range)   enoxaparin ANTICOAGULANT (LOVENOX) injection 40 mg (40 mg Subcutaneous $Given 4/18/23 1616)   dextrose 5% and 0.45% NaCl + KCl 20 mEq/L infusion ( Intravenous Rate/Dose Verify 4/18/23 2347)   ondansetron (ZOFRAN ODT) ODT tab 4 mg (has no administration in time range)     Or   ondansetron (ZOFRAN) injection 4 mg (has no administration in time range)   prochlorperazine (COMPAZINE) injection 10 mg (has no administration in time range)     Or   prochlorperazine (COMPAZINE) tablet 10 mg (has no administration in time range)     Or   prochlorperazine (COMPAZINE) suppository 25 mg (has no administration in time range)   famotidine (PEPCID) injection 20 mg (20 mg Intravenous $Given 4/18/23 1613)   glucose gel 15-30 g (has no administration in time range)     Or   dextrose 50 % injection 25-50 mL (has no administration in time range)     Or   glucagon injection 1 mg (has no administration in time range)   insulin aspart (NovoLOG) injection (RAPID ACTING) (2 Units Subcutaneous $Given 4/18/23 2343)   ketorolac (TORADOL) injection 15 mg (15 mg Intravenous $Given 4/18/23 1646)   HYDROmorphone (DILAUDID) injection 0.2 mg (has no administration in time range)   HYDROmorphone (PF) (DILAUDID) injection 0.5 mg (0.5 mg Intravenous $Given 4/18/23 0657)   ondansetron (ZOFRAN) injection 4 mg (4 mg Intravenous $Given 4/18/23 0656)   famotidine (PEPCID) injection 20 mg (20 mg Intravenous $Given 4/18/23 9426)     Labs Ordered and Resulted from Time of  ED Arrival to Time of ED Departure   COMPREHENSIVE METABOLIC PANEL - Abnormal       Result Value    Sodium 134 (*)     Potassium 4.3      Chloride 98      Carbon Dioxide (CO2) 11 (*)     Anion Gap 25 (*)     Urea Nitrogen 11.8      Creatinine 0.77      Calcium 10.0      Glucose 307 (*)     Alkaline Phosphatase 111      AST 27      ALT 8 (*)     Protein Total 7.7      Albumin 4.3      Bilirubin Total 1.1      GFR Estimate >90     CBC WITH PLATELETS AND DIFFERENTIAL - Abnormal    WBC Count 9.1      RBC Count 5.10      Hemoglobin 13.9      Hematocrit 44.2      MCV 87      MCH 27.3      MCHC 31.4 (*)     RDW 13.4      Platelet Count 331      % Neutrophils 73      % Lymphocytes 21      % Monocytes 5      % Eosinophils 0      % Basophils 0      % Immature Granulocytes 1      NRBCs per 100 WBC 0      Absolute Neutrophils 6.6      Absolute Lymphocytes 1.9      Absolute Monocytes 0.5      Absolute Eosinophils 0.0      Absolute Basophils 0.0      Absolute Immature Granulocytes 0.1      Absolute NRBCs 0.0     GLUCOSE BY METER - Abnormal    GLUCOSE BY METER POCT 223 (*)    HEMOGLOBIN A1C - Abnormal    Hemoglobin A1C 11.5 (*)    GLUCOSE BY METER - Abnormal    GLUCOSE BY METER POCT 241 (*)    GLUCOSE BY METER - Abnormal    GLUCOSE BY METER POCT 235 (*)    LIPASE - Normal    Lipase 33     LACTIC ACID WHOLE BLOOD - Normal    Lactic Acid 0.8     COVID-19 VIRUS (CORONAVIRUS) BY PCR - Normal    SARS CoV2 PCR Negative     CREATININE - Normal    Creatinine 0.77      GFR Estimate >90     ROUTINE [facility] WITH MICROSCOPIC REFLEX TO CULTURE   GLUCOSE MONITOR NURSING POCT   GLUCOSE MONITOR NURSING POCT     XR Abdomen Port 1 View   Final Result   Impression:    NG tube tip and sidehole project over the stomach.      I have personally reviewed the examination and initial interpretation   and I agree with the findings.      MARY ROMERO MD            SYSTEM ID:  [id number]      XR Abdomen 2 Views   Final Result   Impression:    Findings are not  significantly changed compared to CT 4/16/2023   accounting for differences in technique.      MARY ROMERO MD            SYSTEM ID:  [id number]             Critical care was not performed.     Medical Decision Making  The patient's presentation was of high complexity (a chronic illness severe exacerbation, progression, or side effect of treatment).    The patient's evaluation involved:  ordering and/or review of 3+ test(s) in this encounter (see separate area of note for details)  review of 1 test result(s) ordered prior to this encounter (prior CT)    The patient's management necessitated further care after sign-out to Dr Barkley.  Anticipate hosp amission.  Discussed with Gen surg (see their note for further management).      Assessment & Plan    Patient with recently diagnosed high-grade bowel obstruction who recently left AGAINST MEDICAL ADVICE presents to the ED. return to the ED with continued pain.    On arrival, patient is normal vital signs.  He has diffuse tenderness over his entire abdomen with mild distention.  No obvious signs of peritonitis.    Plan for repeat CBC, CMP, lipase, abdominal x-ray, and general surgery consult.  May need repeat CT per surgery discretion.  Will sign out to morning provider plan to follow-up on the work-up and disposition accordingly.  Anticipate NG tube and readmission to the hospital.    I have reviewed the nursing notes. I have reviewed the findings, diagnosis, plan and need for follow up with the patient.    New Prescriptions    No medications on file       Final diagnoses:   None       Kofi Mauro DO  [facility] EMERGENCY DEPARTMENT  4/18/2023     Kofi Mauro DO  04/19/23 0015

## 2023-04-18 NOTE — ED TRIAGE NOTES
Pt brought in per EMS for c/o abd pain. Pt was seen in ED on 4/16/2023 for same concerns. Pt denies throwing up. States abd come in waves     Triage Assessment     Row Name 04/18/23 0554       Triage Assessment (Adult)    Airway WDL WDL       Respiratory WDL    Respiratory WDL WDL       Skin Circulation/Temperature WDL    Skin Circulation/Temperature WDL WDL       Cardiac WDL    Cardiac WDL WDL       Peripheral/Neurovascular WDL    Peripheral Neurovascular WDL WDL       Cognitive/Neuro/Behavioral WDL    Cognitive/Neuro/Behavioral WDL WDL

## 2023-04-18 NOTE — DISCHARGE SUMMARY
[facility] Discharge Against Medical Advice    Andrew Collier MRN: [id number]   YOB: 1965 Age: 57 year old     Date of Admission:  4/16/2023  Date of Discharge::  4/16/2023 11:17 AM  Admitting Physician:  Samuel Garcia DO  Discharge Physician:  Patient discharged AMA, witnessed by Lucy Chen MD  Primary Care Physician:         Chevy Reynoso          Admission Diagnoses:   Small bowel obstruction (H) [K56.609]            Discharge Diagnosis:   Small bowel obstruction (H) [K56.609]  Severe hyperglycemia         Procedures:   None        Non-operative procedures:   None          Consultations:   None            Brief History of Illness:   Andrew Collier is a 57 year old male with recurrent SBO recently admitted 3/28-3/31 for this diagnosis, who presented to the ED with nausea and epigastric pain and was found to have SBO and major gastric distention.           Hospital Course:   The patient reported feeling better once he had a large bowel movement and received a GI cocktail.    The patient then decided he wanted to leave the hospital. I saw the patient at bedside at 11:00 A.M. accompanied by a nurse witness. I informed the patient that given his hyperglycemia to the 400s, my medical advice was for him to stay in the hospital and receive treatment. I could not authorize his discharge as it would present safety risks to him.      I discussed the risks of leaving the hospital against this medical advice, and the patient adamantly insisted that he understood the risks and would leave the hospital immediately. He refused to sign the AMA form offered to him. The nurse witnessed our discussion. The patient was encouraged to present again for care.     The witnessing nurse removed the IV from the patient's arm before the patient discharged.     The witnessing nurse and I both signed and filed the AMA form.         Final Pathology Result:   None          Medications Prior to Admission:   (Not in a hospital admission)           Discharge Medications:     Discharge Medication List as of 4/16/2023 11:18 AM      CONTINUE these medications which have NOT CHANGED    Details   acetaminophen (TYLENOL) 500 MG tablet Take 1-2 tablets (500-1,000 mg) by mouth every 6 hours as needed for mild pain, Disp-90 tablet, R-0, E-Prescribe      bictegravir-emtricitabine-tenofovir (BIKTARVY) -25 MG per tablet Take 1 tablet by mouth daily, Disp-30 tablet, R-2, E-Prescribe      blood glucose (NO BRAND SPECIFIED) lancets standard Use to test blood sugar 1 time daily or as directed.Disp-100 lancet, [id number]      blood glucose (NO BRAND SPECIFIED) test strip Use to test blood sugar 1 time daily or as directed., Disp-100 strip, R-4, E-Prescribe      blood glucose monitoring (NO BRAND SPECIFIED) meter device kit Use to test blood sugar 1 time daily or as directed.Disp-1 kit, [id number]      buPROPion (WELLBUTRIN XL) 150 MG 24 hr tablet Take 1 tablet (150 mg) by mouth daily, Disp-60 tablet, R-0, E-Prescribe      famotidine (PEPCID) 40 MG tablet Take 1 tablet (40 mg) by mouth daily, Disp-60 tablet, R-1, E-Prescribe      glimepiride (AMARYL) 4 MG tablet Take 1 tablet (4 mg) by mouth 2 times daily (before meals), Disp-90 tablet, R-1, E-Prescribe      ibuprofen (ADVIL/MOTRIN) 600 MG tablet Take 1 tablet (600 mg) by mouth every 6 hours as needed for moderate pain (4-6), Disp-90 tablet, R-0, E-Prescribe      levofloxacin (LEVAQUIN) 500 MG tablet Take 1 tablet (500 mg) by mouth daily, Disp-3 tablet, R-0, E-Prescribe      metFORMIN (FORTAMET) 1000 MG 24 hr tablet Take 2 tablets (2,000 mg) by mouth daily (with dinner), Disp-120 tablet, R-0, E-Prescribe      sitagliptin (JANUVIA) 100 MG tablet Take 1 tablet (100 mg) by mouth daily, Disp-60 tablet, R-0, E-Prescribe      sulfamethoxazole-trimethoprim (BACTRIM DS) 800-160 MG tablet Take 1 tablet by mouth 2 times daily, Disp-7 tablet, R-0,  E-Prescribe                  Day of Discharge Physical Exam:      General: Adult male in street clothes, standing up to leave  CV: Appears warm  Pulm: No dyspnea, breathing comfortably on RA  Abd: Non-protuberant appearance  Extremities: Ambulatory  Neuro: Alert and oriented x4, moving all extremities spontaneously without apparent deficit         Discharge Instructions and Follow-Up:   No discharge procedures on file.        Home Health Care:   Not applicable           Discharge Disposition:   Discharged to home against medical advice      Condition at discharge: Not medically ready for discharge      Discussed with Staff, Dr. Samuel Garcia DCUAUHTEMOC., General Surgery.    Lucy Chen M.D.  General Surgery Resident PGY1  [facility]

## 2023-04-18 NOTE — H&P
"    [facility]    History and Physical - EGS Service       Date of Admission:  4/18/2023    Assessment & Plan: Surgery   Andrew Collier is a 57 year old male with recurrent SBO recently admitted 3/28-3/31, and 4/16 at which time he left AMA, who now presents with SBO again likely a continuation of his previous.     Admit to egs  Npo, ngt, replete lytes to k>4, phos>3, mg>2  Pain control, nausea control       Diet:   npo  DVT Prophylaxis: Enoxaparin (Lovenox) SQ  Potts Catheter: Not present  Lines: None     Drains: None     Cardiac Monitoring: None  Code Status:   full    Clinically Significant Risk Factors Present on Admission                      # DMII: A1C = N/A within past 6 months    # Overweight: Estimated body mass index is 25.69 kg/m  as calculated from the following:    Height as of 4/16/23: 1.6 m (5' 3\").    Weight as of 4/16/23: 65.8 kg (145 lb).           Disposition Plan      Expected Discharge Date: 04/19/2023                 The patient's care was discussed with the Chief Resident/Fellow.    Jarocho Boss MD  [facility]  Non-urgent messages: Securely message with [employer] (more info)  Text page via [facility] Paging/Directory     ______________________________________________________________________    Chief Complaint   Abdominal pain    History is obtained from the patient    History of Present Illness   Andrew Collier is a 57 year old male with HIV, methamphetamine use disorder, hx of perforated appendicitis s/p appendectomy, recurrent bowel obstructions, most recently 4/16, who now presents with n/v similar to prior SBOs.   Was recently admitted 4/16 after which he left AMA prior that same day. Yesterday he had abdominal pain and persistent nausea/emesis. Last bm and flatus was yesterday.      Prior obstructions have primarily been managed nonoperatively, but he has required operative intervention " in the past with lysis of adhesions. Prior to 4/16 he was seen 3/28-31 and treated conservatively.       Past Medical History    Past Medical History:   Diagnosis Date     Acute appendicitis with localized peritonitis 1/31/2018     AIDS (H)      Allergic rhinitis due to other allergen     DNS     Anal dysplasia      Chronic abdominal pain      CNS toxoplasmosis (H)      COVID-19 1/11/2022     Diabetes type 2, controlled (H)      GERD (gastroesophageal reflux disease)      Herpes zoster 9/23/2016     History of seizure      History of substance abuse (H)      HIV (human immunodeficiency virus infection) (H)      HLD (hyperlipidemia)      Lung nodules      Periungual wart      Pneumonia 1/6/2019     PTSD (post-traumatic stress disorder)      Sleep apnea     doesn't use CPAP       Past Surgical History   Past Surgical History:   Procedure Laterality Date     ANOSCOPY N/A 9/9/2020    Procedure: Exam Under Anesthesia, ANOSCOPY, fulgeration of rectal fissures with Rectal Biopsies;  Surgeon: Thanh Lundberg MD;  Location: [facility] OR     COLONOSCOPY Left 1/22/2016    Procedure: COMBINED COLONOSCOPY, SINGLE OR MULTIPLE BIOPSY/POLYPECTOMY BY BIOPSY;  Surgeon: Clark Saini MD;  Location:  GI     ESOPHAGOSCOPY, GASTROSCOPY, DUODENOSCOPY (EGD), COMBINED N/A 6/6/2022    Procedure: ESOPHAGOGASTRODUODENOSCOPY, WITH BIOPSY;  Surgeon: Kecia Benítez MD;  Location:  GI     HC EXPLORE UNDESC TESTIS,INGUIN/SCROTAL       LAPAROSCOPIC APPENDECTOMY N/A 1/31/2018    Procedure: LAPAROSCOPIC APPENDECTOMY;  LAPAROSCOPIC APPENDECTOMY;  Surgeon: Dawn Holt MD;  Location: [facility] OR     LAPAROSCOPY DIAGNOSTIC (GENERAL) N/A 7/26/2016    Procedure: LAPAROSCOPY DIAGNOSTIC (GENERAL);  Surgeon: Susannah Arriaga MD;  Location:  OR     LAPAROSCOPY DIAGNOSTIC (GENERAL) N/A 4/16/2018    Procedure: LAPAROSCOPY DIAGNOSTIC (GENERAL);  Diagnostic laparoscopy and lysis of adhesions;  Surgeon: Prince Dowling MD;  Location:   OR     OPTICAL TRACKING SYSTEM CRANIOTOMY, EXCISE TUMOR, COMBINED Left 4/10/2015    Procedure: COMBINED OPTICAL TRACKING SYSTEM CRANIOTOMY, EXCISE TUMOR;  Surgeon: Mirlande Colmenares MD;  Location: [facility] OR     REPAIR GAMEKEEPER'S THUMB Right 12/2/2016    Procedure: REPAIR LIGAMENT ULNAR COLLATERAL THUMB (GAMEKEEPER'S);  Surgeon: Evin Zamorano MD;  Location:  OR     [facility] NONSPECIFIC PROCEDURE      right forearm fracture       Prior to Admission Medications   Prior to Admission Medications   Prescriptions Last Dose Informant Patient Reported? Taking?   acetaminophen (TYLENOL) 500 MG tablet  Self No No   Sig: Take 1-2 tablets (500-1,000 mg) by mouth every 6 hours as needed for mild pain   bictegravir-emtricitabine-tenofovir (BIKTARVY) -25 MG per tablet  Self No No   Sig: Take 1 tablet by mouth daily   blood glucose (NO BRAND SPECIFIED) lancets standard  Self No No   Sig: Use to test blood sugar 1 time daily or as directed.   blood glucose (NO BRAND SPECIFIED) test strip  Self No No   Sig: Use to test blood sugar 1 time daily or as directed.   blood glucose monitoring (NO BRAND SPECIFIED) meter device kit  Self No No   Sig: Use to test blood sugar 1 time daily or as directed.   buPROPion (WELLBUTRIN XL) 150 MG 24 hr tablet  Self No No   Sig: Take 1 tablet (150 mg) by mouth daily   Patient not taking: Reported on 3/29/2023   famotidine (PEPCID) 40 MG tablet  Self No No   Sig: Take 1 tablet (40 mg) by mouth daily   glimepiride (AMARYL) 4 MG tablet  Self No No   Sig: Take 1 tablet (4 mg) by mouth 2 times daily (before meals)   Patient not taking: Reported on 3/29/2023   ibuprofen (ADVIL/MOTRIN) 600 MG tablet  Self No No   Sig: Take 1 tablet (600 mg) by mouth every 6 hours as needed for moderate pain (4-6)   levofloxacin (LEVAQUIN) 500 MG tablet  Self No No   Sig: Take 1 tablet (500 mg) by mouth daily   Patient not taking: Reported on 3/29/2023   metFORMIN (FORTAMET) 1000 MG 24 hr tablet  Self No No   Sig:  Take 2 tablets (2,000 mg) by mouth daily (with dinner)   Patient taking differently: Take 3,000 mg by mouth daily (with dinner)   sitagliptin (JANUVIA) 100 MG tablet  Self No No   Sig: Take 1 tablet (100 mg) by mouth daily   Patient not taking: Reported on 3/29/2023   sulfamethoxazole-trimethoprim (BACTRIM DS) 800-160 MG tablet  Self No No   Sig: Take 1 tablet by mouth 2 times daily   Patient not taking: Reported on 3/29/2023      Facility-Administered Medications: None           Physical Exam   Vital Signs: Temp: 97.8  F (36.6  C) Temp src: Oral BP: (!) 153/95 Pulse: 81   Resp: 16 SpO2: 100 %      Weight: 0 lbs 0 ozNo intake or output data in the 24 hours ending 04/18/23 1057  General Appearance: Nad, laying in bed  Respiratory: nlb  Cardiovascular: rrr  GI: soft, mildly distended but no tympany, mildly tender the LUQ, no guarding or rebound  Skin: wwp     Data     I have personally reviewed the following data over the past 24 hrs:    9.1  \   13.9   / 331     N/A N/A N/A /  N/A   N/A N/A N/A \       Procal: N/A CRP: N/A Lactic Acid: 0.8         Imaging results reviewed over the past 24 hrs:   Recent Results (from the past 24 hour(s))   XR Abdomen 2 Views    Narrative    Exam: XR ABDOMEN 2 VIEWS 4/18/2023 8:31 AM    Indication: Diffuse abd pain, high grade bowel obstruction on CT 2  days ago    Comparison: 4/16/2023    Findings:   Upright and supine AP views of the abdomen were obtained. Continued  paucity of small bowel gas, similar to 4/16/2023. No new air-filled  dilated loops of bowel. There is continued distention of the stomach,  unchanged. The previously seen fluid distended loops of small bowel  are not well appreciated on radiograph. No pneumatosis, portal venous  gas, or intra-abdominal free air appreciated. Small stool burden.  Stable appearance of the penile prosthesis and reservoir. Mild streaky  retrocardiac atelectasis. No acute osseous abnormalities.        Impression    Impression:   Findings are  not significantly changed compared to CT 4/16/2023  accounting for differences in technique.    MARY ROMERO MD         SYSTEM ID:  [id number]

## 2023-04-19 ENCOUNTER — APPOINTMENT (OUTPATIENT)
Dept: GENERAL RADIOLOGY | Facility: CLINIC | Age: 58
End: 2023-04-19
Payer: COMMERCIAL

## 2023-04-19 VITALS
SYSTOLIC BLOOD PRESSURE: 140 MMHG | OXYGEN SATURATION: 100 % | HEART RATE: 81 BPM | BODY MASS INDEX: 27.93 KG/M2 | HEIGHT: 63 IN | TEMPERATURE: 98 F | WEIGHT: 157.63 LBS | DIASTOLIC BLOOD PRESSURE: 84 MMHG | RESPIRATION RATE: 18 BRPM

## 2023-04-19 LAB
ALBUMIN UR-MCNC: NEGATIVE MG/DL
ANION GAP SERPL CALCULATED.3IONS-SCNC: 9 MMOL/L (ref 7–15)
APPEARANCE UR: CLEAR
BILIRUB UR QL STRIP: NEGATIVE
BUN SERPL-MCNC: 13.5 MG/DL (ref 8–23)
CALCIUM SERPL-MCNC: 8.6 MG/DL (ref 8.6–10)
CHLORIDE SERPL-SCNC: 104 MMOL/L (ref 98–107)
COLOR UR AUTO: ABNORMAL
CREAT SERPL-MCNC: 0.84 MG/DL (ref 0.67–1.17)
DEPRECATED HCO3 PLAS-SCNC: 21 MMOL/L (ref 22–29)
ERYTHROCYTE [DISTWIDTH] IN BLOOD BY AUTOMATED COUNT: 14 % (ref 10–15)
GFR SERPL CREATININE-BSD FRML MDRD: >90 ML/MIN/1.73M2
GLUCOSE BLDC GLUCOMTR-MCNC: 256 MG/DL (ref 70–99)
GLUCOSE BLDC GLUCOMTR-MCNC: 271 MG/DL (ref 70–99)
GLUCOSE BLDC GLUCOMTR-MCNC: 276 MG/DL (ref 70–99)
GLUCOSE BLDC GLUCOMTR-MCNC: 306 MG/DL (ref 70–99)
GLUCOSE BLDC GLUCOMTR-MCNC: 328 MG/DL (ref 70–99)
GLUCOSE SERPL-MCNC: 285 MG/DL (ref 70–99)
GLUCOSE UR STRIP-MCNC: >=1000 MG/DL
HCT VFR BLD AUTO: 38.8 % (ref 40–53)
HGB BLD-MCNC: 12.2 G/DL (ref 13.3–17.7)
HGB UR QL STRIP: NEGATIVE
KETONES UR STRIP-MCNC: NEGATIVE MG/DL
LEUKOCYTE ESTERASE UR QL STRIP: NEGATIVE
MAGNESIUM SERPL-MCNC: 1.9 MG/DL (ref 1.7–2.3)
MCH RBC QN AUTO: 27.1 PG (ref 26.5–33)
MCHC RBC AUTO-ENTMCNC: 31.4 G/DL (ref 31.5–36.5)
MCV RBC AUTO: 86 FL (ref 78–100)
MUCOUS THREADS #/AREA URNS LPF: PRESENT /LPF
NITRATE UR QL: NEGATIVE
PH UR STRIP: 6 [PH] (ref 5–7)
PHOSPHATE SERPL-MCNC: 2.5 MG/DL (ref 2.5–4.5)
PLATELET # BLD AUTO: 289 10E3/UL (ref 150–450)
POTASSIUM SERPL-SCNC: 3.8 MMOL/L (ref 3.4–5.3)
RBC # BLD AUTO: 4.51 10E6/UL (ref 4.4–5.9)
RBC URINE: 1 /HPF
SODIUM SERPL-SCNC: 134 MMOL/L (ref 136–145)
SP GR UR STRIP: 1.03 (ref 1–1.03)
TRANSITIONAL EPI: <1 /HPF
UROBILINOGEN UR STRIP-MCNC: NORMAL MG/DL
WBC # BLD AUTO: 4.7 10E3/UL (ref 4–11)
WBC URINE: 1 /HPF

## 2023-04-19 PROCEDURE — 258N000003 HC RX IP 258 OP 636

## 2023-04-19 PROCEDURE — 74018 RADEX ABDOMEN 1 VIEW: CPT | Mod: 26 | Performed by: RADIOLOGY

## 2023-04-19 PROCEDURE — 250N000013 HC RX MED GY IP 250 OP 250 PS 637

## 2023-04-19 PROCEDURE — 83735 ASSAY OF MAGNESIUM: CPT

## 2023-04-19 PROCEDURE — 84100 ASSAY OF PHOSPHORUS: CPT

## 2023-04-19 PROCEDURE — 74018 RADEX ABDOMEN 1 VIEW: CPT

## 2023-04-19 PROCEDURE — 81001 URINALYSIS AUTO W/SCOPE: CPT

## 2023-04-19 PROCEDURE — 82962 GLUCOSE BLOOD TEST: CPT

## 2023-04-19 PROCEDURE — 36415 COLL VENOUS BLD VENIPUNCTURE: CPT

## 2023-04-19 PROCEDURE — 80048 BASIC METABOLIC PNL TOTAL CA: CPT

## 2023-04-19 PROCEDURE — 99222 1ST HOSP IP/OBS MODERATE 55: CPT

## 2023-04-19 PROCEDURE — 85027 COMPLETE CBC AUTOMATED: CPT

## 2023-04-19 PROCEDURE — 250N000011 HC RX IP 250 OP 636

## 2023-04-19 PROCEDURE — 999N000248 HC STATISTIC IV INSERT WITH US BY RN

## 2023-04-19 RX ORDER — SODIUM CHLORIDE, SODIUM LACTATE, POTASSIUM CHLORIDE, CALCIUM CHLORIDE 600; 310; 30; 20 MG/100ML; MG/100ML; MG/100ML; MG/100ML
INJECTION, SOLUTION INTRAVENOUS CONTINUOUS
Status: DISCONTINUED | OUTPATIENT
Start: 2023-04-19 | End: 2023-04-19 | Stop reason: HOSPADM

## 2023-04-19 RX ORDER — BISACODYL 10 MG
10 SUPPOSITORY, RECTAL RECTAL DAILY PRN
Status: DISCONTINUED | OUTPATIENT
Start: 2023-04-19 | End: 2023-04-19 | Stop reason: HOSPADM

## 2023-04-19 RX ADMIN — POTASSIUM CHLORIDE, DEXTROSE MONOHYDRATE AND SODIUM CHLORIDE: 150; 5; 450 INJECTION, SOLUTION INTRAVENOUS at 01:08

## 2023-04-19 RX ADMIN — INSULIN ASPART 2 UNITS: 100 INJECTION, SOLUTION INTRAVENOUS; SUBCUTANEOUS at 12:26

## 2023-04-19 RX ADMIN — HYDROMORPHONE HYDROCHLORIDE 0.2 MG: 0.2 INJECTION, SOLUTION INTRAMUSCULAR; INTRAVENOUS; SUBCUTANEOUS at 11:12

## 2023-04-19 RX ADMIN — BICTEGRAVIR SODIUM, EMTRICITABINE, AND TENOFOVIR ALAFENAMIDE FUMARATE 1 TABLET: 50; 200; 25 TABLET ORAL at 08:30

## 2023-04-19 RX ADMIN — INSULIN ASPART 3 UNITS: 100 INJECTION, SOLUTION INTRAVENOUS; SUBCUTANEOUS at 08:30

## 2023-04-19 RX ADMIN — ENOXAPARIN SODIUM 40 MG: 40 INJECTION SUBCUTANEOUS at 16:11

## 2023-04-19 RX ADMIN — FAMOTIDINE 20 MG: 10 INJECTION, SOLUTION INTRAVENOUS at 16:11

## 2023-04-19 RX ADMIN — INSULIN ASPART 4 UNITS: 100 INJECTION, SOLUTION INTRAVENOUS; SUBCUTANEOUS at 20:03

## 2023-04-19 RX ADMIN — INSULIN ASPART 3 UNITS: 100 INJECTION, SOLUTION INTRAVENOUS; SUBCUTANEOUS at 04:20

## 2023-04-19 RX ADMIN — POTASSIUM CHLORIDE, DEXTROSE MONOHYDRATE AND SODIUM CHLORIDE: 150; 5; 450 INJECTION, SOLUTION INTRAVENOUS at 13:30

## 2023-04-19 RX ADMIN — FAMOTIDINE 20 MG: 10 INJECTION, SOLUTION INTRAVENOUS at 09:14

## 2023-04-19 RX ADMIN — DOCUSATE SODIUM 1 ENEMA: 283 LIQUID RECTAL at 11:18

## 2023-04-19 RX ADMIN — INSULIN ASPART 4 UNITS: 100 INJECTION, SOLUTION INTRAVENOUS; SUBCUTANEOUS at 16:10

## 2023-04-19 RX ADMIN — SODIUM CHLORIDE, POTASSIUM CHLORIDE, SODIUM LACTATE AND CALCIUM CHLORIDE: 600; 310; 30; 20 INJECTION, SOLUTION INTRAVENOUS at 18:16

## 2023-04-19 ASSESSMENT — ACTIVITIES OF DAILY LIVING (ADL)
NUMBER_OF_TIMES_PATIENT_HAS_FALLEN_WITHIN_LAST_SIX_MONTHS: 0
ADLS_ACUITY_SCORE: 37
ADLS_ACUITY_SCORE: 20
DOING_ERRANDS_INDEPENDENTLY_DIFFICULTY: NO
ADLS_ACUITY_SCORE: 37
ADLS_ACUITY_SCORE: 20
CHANGE_IN_FUNCTIONAL_STATUS_SINCE_ONSET_OF_CURRENT_ILLNESS/INJURY: NO
ADLS_ACUITY_SCORE: 20
TOILETING_ISSUES: NO
WALKING_OR_CLIMBING_STAIRS_DIFFICULTY: NO
ADLS_ACUITY_SCORE: 20
CONCENTRATING,_REMEMBERING_OR_MAKING_DECISIONS_DIFFICULTY: NO
ADLS_ACUITY_SCORE: 20
WEAR_GLASSES_OR_BLIND: NO
DIFFICULTY_EATING/SWALLOWING: NO
ADLS_ACUITY_SCORE: 20
ADLS_ACUITY_SCORE: 20
DRESSING/BATHING_DIFFICULTY: NO
FALL_HISTORY_WITHIN_LAST_SIX_MONTHS: NO

## 2023-04-19 NOTE — PROGRESS NOTES
Brief Surgery Progress Note  04/19/23    Patient had a bowel movement after enema and was feeling somewhat improved. He said he would leave the hospital if he was not given a diet. We discussed that his SBO has not completely resolved, and that he was not medically ready for discharge. Given his small clinical improvement, the patient could slowly trial popsicles and perhaps a small amount of Jello. The patient then felt okay with staying and continuing treatment.    He reports taking both PTA diabetes medicines daily (Januvia and glimepiride).    Lucy Chen M.D.  General Surgery Resident PGY1  [facility]

## 2023-04-19 NOTE — PROVIDER NOTIFICATION
Provider text paged Re: Patient wants to leave because he is very hungry. Requesting to see provider before he leaves.

## 2023-04-19 NOTE — PROVIDER NOTIFICATION
Re: Provider text paged Re: Please call back on [phone] to discuss about this patient. Patient threatening to leave if he does not get a regular diet. Thank you.

## 2023-04-19 NOTE — PROVIDER NOTIFICATION
Re: Patient received a breakfast tray by mistake. He is requesting for a regular diet order. Also, he says he will take an enema and not a suppository. Enema order placed but was discontinued before it was administered. Please advise.

## 2023-04-19 NOTE — PROGRESS NOTES
Brief Surgery Progress Note  04/19/23    Paged about patient receiving a breakfast tray by mistake this morning. Patient was also telling nurse he would leave if not given a regular diet.    I spoke with the patient at bedside at about 10:10h. He was comfortably resting in bed and denied nausea, belching, or abdominal discomfort. He thought bloating was stable. No flatus or bowel movement.    We discussed that his X-ray showed some improvement with less gaseous distention of the stomach. Recommended no diet today to continue to let the bowel recover (as restarting a diet right when he feels better could delay complete resolution of his SBO). The patient agreed to this. He requested an enema; this was ordered.    Lucy Chen M.D.  General Surgery Resident PGY1  [facility]

## 2023-04-19 NOTE — PROGRESS NOTES
"CLINICAL NUTRITION SERVICES - BRIEF NOTE FOR POSITIVE MST SCORE     Reason for RD note: Pt with positive MST (Malnutrition Screening Tool) score for \"unsure wt loss\"    New Findings/Chart Review:  Wt Readings from Last 15 Encounters:   04/19/23 71.5 kg (157 lb 10.1 oz)   04/16/23 65.8 kg (145 lb)   03/13/23 63.5 kg (140 lb)   02/15/23 67.9 kg (149 lb 11.1 oz)   02/06/23 68 kg (150 lb)   01/25/23 63.5 kg (140 lb)   01/24/23 63.5 kg (140 lb)   12/23/22 63.5 kg (140 lb)   12/17/22 63.5 kg (140 lb)   12/15/22 63.9 kg (140 lb 12.8 oz)   12/09/22 67.2 kg (148 lb 2.4 oz)   11/18/22 67.1 kg (148 lb 0.2 oz)   09/08/22 68 kg (150 lb)   06/24/22 70.3 kg (155 lb)   06/05/22 70.3 kg (155 lb)   No recent wt loss noted per chart review.         Nutrition will follow per LOS protocol or sooner if consulted.    Melissa Jolly RD, LD  7A/Obs RD pager: [phone]  Weekend/Holiday RD pager: [phone]   "

## 2023-04-19 NOTE — PROVIDER NOTIFICATION
MD notified    FYI pt refused vitals, assessments and medications this am. Will try to reattempt later.

## 2023-04-19 NOTE — UTILIZATION REVIEW
Admission Status; Secondary Review Determination    Under the authority of the Utilization Management Committee, the utilization review process indicated a secondary review on the above patient. The review outcome is based on review of the medical records, discussions with staff, and applying clinical experience noted on the date of the review.    (x) Inpatient Status Appropriate - This patient's medical care is consistent with medical management for inpatient care and reasonable inpatient medical practice.    RATIONALE FOR DETERMINATION: 57-year-old male with recurrent small bowel obstruction and admitted initially the end of March presented to the hospital on April 16 for significant epigastric pain with nausea and vomiting.  CT imaging revealed marked dilation of the stomach and proximal small bowel into the left lower quadrant with an abrupt transition to decompressed bowel consistent with a high-grade mechanical small bowel obstruction.  Patient left AGAINST MEDICAL ADVICE on the 17th or any return early the following morning due to ongoing obstructive symptoms.  Patient was readmitted to the hospital for ongoing management of patient's severe small bowel obstruction appropriate for inpatient management.    At the time of admission with the information available to the attending physician more than 2 nights Hospital complex care was anticipated, based on patient risk of adverse outcome if treated as outpatient and complex care required. Inpatient admission is appropriate based on the Medicare guidelines.    This document was produced using voice recognition software    The information on this document is developed by the utilization review team in order for the business office to ensure compliance. This only denotes the appropriateness of proper admission status and does not reflect the quality of care rendered.    The definitions of Inpatient Status and Observation Status used in making the determination  above are those provided in the CMS Coverage Manual, Chapter 1 and Chapter 6, section 70.4.    Sincerely,    Kade Pillai MD  Utilization Review  Physician Advisor  [facility].

## 2023-04-19 NOTE — ED NOTES
"On rounding pt NG tube was found lying on the floor. When asked the pt said, he was rolling in bed and \" it might have accidentally dislodged\". Pt refused The NG tube to be replaced. Provider paged.   "

## 2023-04-19 NOTE — PROGRESS NOTES
Surgery Progress Note    Andrew Collier  [id number]    No acute overnight events. Patient says NGT fell out - no longer in place.  Pain is minimal. He feels hungry. No gas or stool  Intermittently refusing vitals and meds    Pulse:  [] 71  Resp:  [16-20] 20  BP: ()/() 103/61  SpO2:  [96 %-100 %] 100 %    Intake/Output Summary (Last 24 hours) at 4/19/2023 0610  Last data filed at 4/19/2023 0411  Gross per 24 hour   Intake 1200 ml   Output 450 ml   Net 750 ml       NAD, resting comfortably  regular rate  non-labored breathing  soft, non tender, mildly distended  warm and well perfused   alert, oriented    57 year old male with recurrent SBO recently admitted 3/28-3/31, and 4/16 at which time he left A, who now presents with SBO again likely a continuation of his previous.     - hold on ngt replacement  - axr today  - Npo, replete lytes to k>4, phos>3, mg>2  - Pain control, nausea control    Seen with chief resident     Jarocho Boss MD PGY2  General Surgery

## 2023-04-19 NOTE — CONSULTS
"NEW INPATIENT DIABETES MANAGEMENT CONSULT  Andrew Collier  Age: 57 year old  MRN # [id number]   YOB: 1965    Chief Complaint: high BG  Reason for Consult: \"diabetic patient not on insulin PTA but high BS and A1c now 11.7. Would like assistance on inpt and eventual outpt management\"  Consulting Provider: Jarocho Boss MD    History of Present Illness:   Andrew Collier is a 57 year old male with a history of recurrent bowel obstruction, T2DM, HIV who presents to the ED for evaluation of abdominal pain.  He was seen in the ED 2 days ago.  CT scan at that time shows a high-grade bowel obstruction.      Pt is  known to the Inpatient Diabetes Service from past admission(s)- most recently 2/2023    History obtained via the patient, chart review, and discussion with consulting team.    Diabetes Focus:   Andrew has type 2 diabetes noting he currently is managed with Januvia 100 mg once daily- andrew states he never misses doses of his medication but currently reports last taking Januvia 3 days ago.     Andrew reports he had histoically been on insulin but stopped using this due to being homeless and not have refrigerator access. He was also on metformin but noted significant GI disturbance and bowel incontinence due to diarrhea.     Glimepride is listed on medication list but patient notes he has never taken this.     Andrew reports that he lives at a shelter but gets his own room. He states a  at the shelter would be able to refrigerate insulin if needed. He reports he is aware of how to give insulin and check BGs. Patient declines CDE consult    Patient was last seen inpatient and prescribed Metformin, glimepriide and januvia at discharge (2/2023)    Patient last seen outpatient for diabetes by Dorie Holland [facility] and was takeing 1500 mg metformin XR only (1/25/2023)    Patient notes lispro insulin gives him a rash at site of injection. Andrew reports lantus and novolog are okay to " give.       Interval History:   BG elevated in the high 200's. Will start lantus tonight and CHO coverage.       Currently, denies nausea, or vomiting. Patient reports some abdominal discomfort       Most recent PTA diabetes regimen includes:   januvia 100 mg daily    BG monitor: Patient notes he has not been checking    BG upon this admission: 223   Renal function: Creatinine (0.84), eGFR (>90)  BMP: Bicarb (21), Anion Gap (9)  Receiving steroids: no  Pt is eating- clear liquids.   Pt is receiving dextrose IVF- attempt      Other Active/Contributory Medical Problems: SBO  Diabetes Mellitus Type: 2  Duration:  2016 diagnosis per patient   Diabetic Complications: peripheral neuropathy, no recent eye exam, denies retinopathy   PTA Diet: 2 meals a day- AM cereal and eggs/pastries, PM:beans, rice, or noodles. Drinks: koolaid- patient lives at shelter and eats food that is available   Usual BG control PTA:  suboptimal control, with A1c 11.5%  on 4/19  History of DKA: denies- recent ED visits with hyperglycemia  Able to Detect Hypoglycemia: yes  Last dilated eye exam: unknown  Usual Diabetes Care Provider: Dorie Holland, pharm D  Primary Care Provider: Chevy Reynoso  Current Diet: Orders Placed This Encounter      Clear Liquid Diet       10 point ROS completed with pertinent positives and negatives noted in the HPI  Past medical, family and social histories are reviewed and updated.    Social History  Social History     Socioeconomic History     Marital status: Single   Occupational History     Occupation:      Comment: 5 years; temp agency     Occupation: Unemployed   Tobacco Use     Smoking status: Some Days     Packs/day: 0.25     Years: 40.00     Pack years: 10.00     Types: Cigarettes     Smokeless tobacco: Former   Substance and Sexual Activity     Alcohol use: No     Alcohol/week: 0.0 standard drinks of alcohol     Comment: Last etoh in 2007     Drug use: Not Currently     Types: Marijuana,  "Methamphetamines     Sexual activity: Not Currently     Partners: Female, Male     Comment: Last sexual activity 2008   Social History Narrative    Born in [facility].  Came to the [place] in 1993.  Last traveled to visit family in 2008.        1/7/2019 - Homeless. Working with a  at [employer]  trying to get housing. Sexually active with two partners recently using condoms 100% of the time. Smokes occasionally, not for the last 4 weeks.        1/7/2020 at [facility] since 1/1/2020. 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  Care established with ID and endocrine        Tobacco:current use    Alcohol:none    Marital Status: single    Place of Residence: [facility]    Occupation: denies    Family History   no reported FHx diabetes    Physical Exam   BP 94/59 (BP Location: Right arm, Cuff Size: Adult Regular)   Pulse 76   Temp 97.4  F (36.3  C) (Oral)   Resp 16   Ht 1.6 m (5' 3\")   Wt 71.5 kg (157 lb 10.1 oz)   SpO2 100%   BMI 27.92 kg/m    Constitutional: pleasant, in no distress. Lying in bed  HEENT: normocephalic, atraumatic. Oral mucous membranes moist.   Lungs: unlabored respiration, no cough  ABD: rounded, nondistended  Skin: warm and dry- limited assessment  MSK:  moves all extremities  Mental status:  alert, oriented to self, place, time  Psych: calm and appropriate interaction     Most Recent Laboratory Tests:  Recent Labs   Lab 04/19/23  0654   HGB 12.2*     Recent Labs   Lab 04/18/23  1938   A1C 11.5*     Recent Labs   Lab 04/19/23  0654   CR 0.84     Recent Labs   Lab 04/19/23  1225 04/19/23  0829 04/19/23  0654 04/19/23  0419 04/18/23  2342 04/18/23  1927   * 271* 285* 256* 235* 241*       Assessment:   1. Type 2 DM suboptimal control, with A1c 11.5%  on 4/19- exacerbated by noncompliance   2. Anion gap elevation on ED presentation- now closed at 9- no ketones       Plan:    -start Lantus 12 units (reduced for minimal oral intake)   -Novolog 1:15 CHO coverage   -Novolog " med sliding scale AC/HS   -BG monitoring TID AC, HS, 0200   -hypoglycemia protocol   -carb counting protocol   -diabetes education needs:declines CDE- knows how to give insulin and check BG   -pharmacy liaison coverage for basal insulin   -Potentially discharge on Januvia with Lantus   -Hold PTA oral diabetic medication for now   -on discharge, will recommend outpatient follow up with [facility]ology service     Discussed plan of care with patient, nursing, and primary team   Thank you for this consult; Inpatient Diabetes will continue to follow.         Contacting the Inpatient Diabetes Team   From 7AM-5PM: page inpatient diabetes provider that is following the patient, or utilize the job code paging system.   From 5PM-7AM: page the job code for endocrine fellow on call.       Please use the following job code to reach the Inpatient Diabetes team. Note that you must use an in house phone and that job codes cannot receive text pages.     Dial [phone] (or star-star-star [phone] on the new [employer] telephones), then [phone] to reach the endocrine-diabetes provider on call.    I spent a total of 75 minutes face to face or coordinating care of Andrew Collier.             Over 50% of my time on the unit was spent counseling the patient and/or coordinating care regarding acute hyperglycemia management.  See note for details.    VIKASH Quezada, CNP  Inpatient Diabetes Service  Pager: [phone]

## 2023-04-20 ENCOUNTER — PATIENT OUTREACH (OUTPATIENT)
Dept: SURGERY | Facility: CLINIC | Age: 58
End: 2023-04-20
Payer: COMMERCIAL

## 2023-04-20 NOTE — DISCHARGE SUMMARY
[facility]  Surgery Discharge Summary Leaving Against Medical Advice     Date of Admission:  4/18/2023  Date of Discharge:  4/19/2023  9:21 PM  Discharging Provider: Lam Calderon MD  Discharge Service: EGS    Discharge Diagnoses   SBO    Unresulted Labs Ordered in the Past 30 Days of this Admission     No orders found from 3/19/2023 to 4/19/2023.          Discharge Disposition   Pt left AMA. Attempted to discuss w/ pt that we do not recommend advancing his diet at this time and would not be doing so. Pt thrashing in bed and crying out for advancement, insisting he will leave. Informed the pt that he would be leaving AMA and would need to fill out documentation.     Hospital Course    Pt received non-operative SBO management and began having some bowel function prior to discharge w/ small BMs    Consultations This Hospital Stay   ENDOCRINE DIABETES ADULT IP CONSULT  DIABETES EDUCATION IP CONSULT  NURSING TO CONSULT FOR VASCULAR ACCESS CARE IP CONSULT  PHARMACY LIAISON FOR MEDICATION COVERAGE CONSULT    Code Status   Prior    Ayush Kwong MD  Surgical Resident  #[id number]  ___________________________________________________________________    Physical Exam   Vital Signs: Temp: 98  F (36.7  C) Temp src: Oral BP: (!) 140/84 Pulse: 81   Resp: 18 SpO2: 100 % O2 Device: None (Room air)    Weight: 157 lbs 10.06 oz    Pt did not cooperate for physical exam.     Primary Care Physician   Chevy Reynoso    Discharge Orders   No discharge procedures on file.    Discharge Medications   Discharge Medication List as of 4/19/2023  9:22 PM      CONTINUE these medications which have NOT CHANGED    Details   acetaminophen (TYLENOL) 500 MG tablet Take 1-2 tablets (500-1,000 mg) by mouth every 6 hours as needed for mild pain, Disp-90 tablet, R-0, E-Prescribe      bictegravir-emtricitabine-tenofovir (BIKTARVY) -25 MG per tablet Take 1 tablet by mouth daily, Disp-30 tablet, R-2,  "E-Prescribe      blood glucose (NO BRAND SPECIFIED) lancets standard Use to test blood sugar 1 time daily or as directed.Disp-100 lancet, [id number]      blood glucose (NO BRAND SPECIFIED) test strip Use to test blood sugar 1 time daily or as directed., Disp-100 strip, R-4, E-Prescribe      blood glucose monitoring (NO BRAND SPECIFIED) meter device kit Use to test blood sugar 1 time daily or as directed.Disp-1 kit, [id number]      buPROPion (WELLBUTRIN XL) 150 MG 24 hr tablet Take 1 tablet (150 mg) by mouth daily, Disp-60 tablet, R-0, E-Prescribe      famotidine (PEPCID) 40 MG tablet Take 1 tablet (40 mg) by mouth daily, Disp-60 tablet, R-1, E-Prescribe      glimepiride (AMARYL) 4 MG tablet Take 1 tablet (4 mg) by mouth 2 times daily (before meals), Disp-90 tablet, R-1, E-Prescribe      ibuprofen (ADVIL/MOTRIN) 600 MG tablet Take 1 tablet (600 mg) by mouth every 6 hours as needed for moderate pain (4-6), Disp-90 tablet, R-0, E-Prescribe      levofloxacin (LEVAQUIN) 500 MG tablet Take 1 tablet (500 mg) by mouth daily, Disp-3 tablet, R-0, E-Prescribe      metFORMIN (FORTAMET) 1000 MG 24 hr tablet Take 2 tablets (2,000 mg) by mouth daily (with dinner), Disp-120 tablet, R-0, E-Prescribe      sitagliptin (JANUVIA) 100 MG tablet Take 1 tablet (100 mg) by mouth daily, Disp-60 tablet, R-0, E-Prescribe      sulfamethoxazole-trimethoprim (BACTRIM DS) 800-160 MG tablet Take 1 tablet by mouth 2 times daily, Disp-7 tablet, R-0, E-Prescribe           Allergies   Allergies   Allergen Reactions     Fentanyl Blisters     Per pt, after taking this medication had blisters develope     Tylenol [Acetaminophen] Itching     Dulaglutide Rash     Insulin Lispro Rash     Patient reported     Penicillin V Other (See Comments) and Rash     Diffuse maculopapular rash + feels \"high\", per pt.      "

## 2023-04-20 NOTE — PROGRESS NOTES
04/20/23    9:00 AM     Andrew Collier is a patient of the General Surgery Team that was hospitalized with a patient perceived SBO that resolved prior to the patient leaving the hospital AMA.     Attempted to contact patient via telephone for a status update and review post discharge  teaching.  Phone number out of service x 2- unable to reach the patient.    Of note:  Wound:  No surgery  Follow-up:  PRN. Follow up with PCP.  Restrictions:  - No activity restrictions  New medications:  None  Equipment/Supplies:  None

## 2023-04-20 NOTE — PROGRESS NOTES
BRIEF SURGERY NOTE   4/19/23 2108    While in Obs unit notified by RN that pt demanding advancement of his diet.     Attempted to discuss w/ pt that we do not recommend advancing his diet at this time and would not be doing so. Pt thrashing in bed and crying out for advancement, insisting he will leave. Informed the pt that he would be leaving AMA and would need to fill out documentation.       Ayush Kwong MD  Surgical Resident

## 2023-04-20 NOTE — PROGRESS NOTES
Pt was requesting to have his diet advanced. RN and patient spoke with surgery team who did not recommend advancing diet at this time. Pt wished to leave AMA. PIV removed and documented by RN, AMA papers signed by patient and placed in paper chart record. Surgery team aware.

## 2023-04-20 NOTE — DISCHARGE INSTRUCTIONS
IP Diabetes Management Team Discharge Instructions    Glucose Control Regimen: ***    Blood Glucose Checks: ***three times daily before meals, and at bedtime.    Endocrinology Outpatient follow up: An appointment request was sent to the [facility] coordinator to schedule your outpatient diabetes appointment 1-2*** weeks from discharge. Please call the clinic at [phone] if you do not have an appointment scheduled on discharge, or if you have non-urgent questions regarding your blood sugars or insulin.     If you have urgent questions or concerns regarding your blood sugars or insulin, you may contact [phone] (the main hospital ). Ask to speak with the endocrinologist on call.    Your target A1c value is less than 7***%. Your most recent A1c is ***.     Thank you for letting the Diabetes Management Team be involved in your care!

## 2023-05-02 NOTE — PLAN OF CARE
AVSS on RA. Q4 BGs: 132 & 134. Denies pain and nausea, though scheduled tylenol was given at 0300. Tolerates regular diet. Not saving output. Up ad lexi, independent. D/c today between 10:30a-3:30p to scheduled shelter. Will continue to monitor and follow POC.     SIMIN follow up  Is this an oncology patient? N/A  Imaging completed prior to appointment?           PFT:  No results found. However, due to the size of the patient record, not all encounters were searched. Please check Results Review for a complete set of results.         Spirometry:  No results found. However, due to the size of the patient record, not all encounters were searched. Please check Results Review for a complete set of results.         Overnight Oximetry:  No results found. However, due to the size of the patient record, not all encounters were searched. Please check Results Review for a complete set of results.         Chest CT:  11/2/2021         Chest Xray: 3/9/2023                                 Echo: 3/8/2023 No results found. However, due to the size of the patient record, not all encounters were searched. Please check Results Review for a complete set of results.         Swallow Study:  No results found for this or any previous visit.           Sleep Study: 8/13/2015                                                                        Pet Scan:  No results found. However, due to the size of the patient record, not all encounters were searched. Please check Results Review for a complete set of results.           cpap and supplies through [employer] via SD card          Were external records obtained NA    Appt. instructions given:    Remind pt to bring a CPAP equipment for downloads.   Obtain CME information if needed.

## 2023-05-06 ENCOUNTER — APPOINTMENT (OUTPATIENT)
Dept: CT IMAGING | Facility: CLINIC | Age: 58
End: 2023-05-06
Attending: EMERGENCY MEDICINE
Payer: COMMERCIAL

## 2023-05-06 ENCOUNTER — APPOINTMENT (OUTPATIENT)
Dept: GENERAL RADIOLOGY | Facility: CLINIC | Age: 58
End: 2023-05-06
Attending: EMERGENCY MEDICINE
Payer: COMMERCIAL

## 2023-05-06 ENCOUNTER — HOSPITAL ENCOUNTER (INPATIENT)
Facility: CLINIC | Age: 58
LOS: 2 days | Discharge: LEFT BEFORE DISCHARGE | End: 2023-05-08
Attending: EMERGENCY MEDICINE | Admitting: INTERNAL MEDICINE
Payer: COMMERCIAL

## 2023-05-06 DIAGNOSIS — K56.609 SMALL BOWEL OBSTRUCTION (H): ICD-10-CM

## 2023-05-06 DIAGNOSIS — K56.609 INTESTINAL OBSTRUCTION, UNSPECIFIED CAUSE, UNSPECIFIED WHETHER PARTIAL OR COMPLETE (H): ICD-10-CM

## 2023-05-06 LAB
ALBUMIN SERPL BCG-MCNC: 4.7 G/DL (ref 3.5–5.2)
ALP SERPL-CCNC: 165 U/L (ref 40–129)
ALT SERPL W P-5'-P-CCNC: 21 U/L (ref 10–50)
ANION GAP SERPL CALCULATED.3IONS-SCNC: 19 MMOL/L (ref 7–15)
AST SERPL W P-5'-P-CCNC: 28 U/L (ref 10–50)
BASOPHILS # BLD AUTO: 0.1 10E3/UL (ref 0–0.2)
BASOPHILS NFR BLD AUTO: 1 %
BILIRUB SERPL-MCNC: 0.9 MG/DL
BUN SERPL-MCNC: 22.2 MG/DL (ref 8–23)
CALCIUM SERPL-MCNC: 11.5 MG/DL (ref 8.6–10)
CHLORIDE SERPL-SCNC: 94 MMOL/L (ref 98–107)
CREAT SERPL-MCNC: 0.74 MG/DL (ref 0.67–1.17)
DEPRECATED HCO3 PLAS-SCNC: 21 MMOL/L (ref 22–29)
EOSINOPHIL # BLD AUTO: 0.1 10E3/UL (ref 0–0.7)
EOSINOPHIL NFR BLD AUTO: 1 %
ERYTHROCYTE [DISTWIDTH] IN BLOOD BY AUTOMATED COUNT: 14 % (ref 10–15)
GFR SERPL CREATININE-BSD FRML MDRD: >90 ML/MIN/1.73M2
GLUCOSE BLDC GLUCOMTR-MCNC: 149 MG/DL (ref 70–99)
GLUCOSE BLDC GLUCOMTR-MCNC: 221 MG/DL (ref 70–99)
GLUCOSE BLDC GLUCOMTR-MCNC: 314 MG/DL (ref 70–99)
GLUCOSE SERPL-MCNC: 479 MG/DL (ref 70–99)
HCT VFR BLD AUTO: 45.2 % (ref 40–53)
HGB BLD-MCNC: 15.2 G/DL (ref 13.3–17.7)
HOLD SPECIMEN: NORMAL
IMM GRANULOCYTES # BLD: 0.1 10E3/UL
IMM GRANULOCYTES NFR BLD: 1 %
LIPASE SERPL-CCNC: 50 U/L (ref 13–60)
LYMPHOCYTES # BLD AUTO: 1.5 10E3/UL (ref 0.8–5.3)
LYMPHOCYTES NFR BLD AUTO: 12 %
MCH RBC QN AUTO: 27.8 PG (ref 26.5–33)
MCHC RBC AUTO-ENTMCNC: 33.6 G/DL (ref 31.5–36.5)
MCV RBC AUTO: 83 FL (ref 78–100)
MONOCYTES # BLD AUTO: 0.6 10E3/UL (ref 0–1.3)
MONOCYTES NFR BLD AUTO: 5 %
NEUTROPHILS # BLD AUTO: 10.4 10E3/UL (ref 1.6–8.3)
NEUTROPHILS NFR BLD AUTO: 80 %
NRBC # BLD AUTO: 0 10E3/UL
NRBC BLD AUTO-RTO: 0 /100
PLATELET # BLD AUTO: 397 10E3/UL (ref 150–450)
POTASSIUM SERPL-SCNC: 4.5 MMOL/L (ref 3.4–5.3)
PROT SERPL-MCNC: 8.2 G/DL (ref 6.4–8.3)
RBC # BLD AUTO: 5.46 10E6/UL (ref 4.4–5.9)
SODIUM SERPL-SCNC: 134 MMOL/L (ref 136–145)
WBC # BLD AUTO: 12.8 10E3/UL (ref 4–11)

## 2023-05-06 PROCEDURE — 96374 THER/PROPH/DIAG INJ IV PUSH: CPT | Performed by: EMERGENCY MEDICINE

## 2023-05-06 PROCEDURE — 99285 EMERGENCY DEPT VISIT HI MDM: CPT | Performed by: EMERGENCY MEDICINE

## 2023-05-06 PROCEDURE — 36415 COLL VENOUS BLD VENIPUNCTURE: CPT | Performed by: EMERGENCY MEDICINE

## 2023-05-06 PROCEDURE — 86360 T CELL ABSOLUTE COUNT/RATIO: CPT

## 2023-05-06 PROCEDURE — 80053 COMPREHEN METABOLIC PANEL: CPT | Performed by: EMERGENCY MEDICINE

## 2023-05-06 PROCEDURE — 36415 COLL VENOUS BLD VENIPUNCTURE: CPT

## 2023-05-06 PROCEDURE — 74176 CT ABD & PELVIS W/O CONTRAST: CPT

## 2023-05-06 PROCEDURE — 120N000002 HC R&B MED SURG/OB UMMC

## 2023-05-06 PROCEDURE — 83690 ASSAY OF LIPASE: CPT | Performed by: EMERGENCY MEDICINE

## 2023-05-06 PROCEDURE — 99223 1ST HOSP IP/OBS HIGH 75: CPT | Mod: GC | Performed by: INTERNAL MEDICINE

## 2023-05-06 PROCEDURE — 250N000011 HC RX IP 250 OP 636: Performed by: EMERGENCY MEDICINE

## 2023-05-06 PROCEDURE — 258N000003 HC RX IP 258 OP 636

## 2023-05-06 PROCEDURE — 258N000003 HC RX IP 258 OP 636: Performed by: EMERGENCY MEDICINE

## 2023-05-06 PROCEDURE — C9113 INJ PANTOPRAZOLE SODIUM, VIA: HCPCS

## 2023-05-06 PROCEDURE — 96361 HYDRATE IV INFUSION ADD-ON: CPT | Performed by: EMERGENCY MEDICINE

## 2023-05-06 PROCEDURE — 250N000011 HC RX IP 250 OP 636

## 2023-05-06 PROCEDURE — 96375 TX/PRO/DX INJ NEW DRUG ADDON: CPT | Performed by: EMERGENCY MEDICINE

## 2023-05-06 PROCEDURE — 99285 EMERGENCY DEPT VISIT HI MDM: CPT | Mod: 25 | Performed by: EMERGENCY MEDICINE

## 2023-05-06 PROCEDURE — 71045 X-RAY EXAM CHEST 1 VIEW: CPT | Mod: 26 | Performed by: RADIOLOGY

## 2023-05-06 PROCEDURE — 82962 GLUCOSE BLOOD TEST: CPT

## 2023-05-06 PROCEDURE — 250N000012 HC RX MED GY IP 250 OP 636 PS 637

## 2023-05-06 PROCEDURE — 85004 AUTOMATED DIFF WBC COUNT: CPT | Performed by: EMERGENCY MEDICINE

## 2023-05-06 PROCEDURE — 71045 X-RAY EXAM CHEST 1 VIEW: CPT

## 2023-05-06 RX ORDER — NICOTINE POLACRILEX 4 MG
15-30 LOZENGE BUCCAL
Status: DISCONTINUED | OUTPATIENT
Start: 2023-05-06 | End: 2023-05-08 | Stop reason: HOSPADM

## 2023-05-06 RX ORDER — ONDANSETRON 2 MG/ML
4 INJECTION INTRAMUSCULAR; INTRAVENOUS EVERY 6 HOURS PRN
Status: DISCONTINUED | OUTPATIENT
Start: 2023-05-06 | End: 2023-05-08 | Stop reason: HOSPADM

## 2023-05-06 RX ORDER — HYDROMORPHONE HYDROCHLORIDE 1 MG/ML
0.5 INJECTION, SOLUTION INTRAMUSCULAR; INTRAVENOUS; SUBCUTANEOUS ONCE
Status: COMPLETED | OUTPATIENT
Start: 2023-05-06 | End: 2023-05-06

## 2023-05-06 RX ORDER — ONDANSETRON 2 MG/ML
4 INJECTION INTRAMUSCULAR; INTRAVENOUS ONCE
Status: COMPLETED | OUTPATIENT
Start: 2023-05-06 | End: 2023-05-06

## 2023-05-06 RX ORDER — ACETAMINOPHEN 325 MG/1
975 TABLET ORAL EVERY 6 HOURS PRN
Status: DISCONTINUED | OUTPATIENT
Start: 2023-05-06 | End: 2023-05-08 | Stop reason: HOSPADM

## 2023-05-06 RX ORDER — ONDANSETRON 4 MG/1
4 TABLET, ORALLY DISINTEGRATING ORAL EVERY 6 HOURS PRN
Status: DISCONTINUED | OUTPATIENT
Start: 2023-05-06 | End: 2023-05-08 | Stop reason: HOSPADM

## 2023-05-06 RX ORDER — DEXTROSE MONOHYDRATE 25 G/50ML
25-50 INJECTION, SOLUTION INTRAVENOUS
Status: DISCONTINUED | OUTPATIENT
Start: 2023-05-06 | End: 2023-05-08 | Stop reason: HOSPADM

## 2023-05-06 RX ORDER — FAMOTIDINE 20 MG/1
40 TABLET, FILM COATED ORAL DAILY
Status: DISCONTINUED | OUTPATIENT
Start: 2023-05-07 | End: 2023-05-08 | Stop reason: HOSPADM

## 2023-05-06 RX ORDER — HYDROMORPHONE HYDROCHLORIDE 1 MG/ML
0.5 INJECTION, SOLUTION INTRAMUSCULAR; INTRAVENOUS; SUBCUTANEOUS
Status: DISCONTINUED | OUTPATIENT
Start: 2023-05-06 | End: 2023-05-07

## 2023-05-06 RX ORDER — SODIUM CHLORIDE, SODIUM LACTATE, POTASSIUM CHLORIDE, CALCIUM CHLORIDE 600; 310; 30; 20 MG/100ML; MG/100ML; MG/100ML; MG/100ML
INJECTION, SOLUTION INTRAVENOUS CONTINUOUS
Status: DISCONTINUED | OUTPATIENT
Start: 2023-05-06 | End: 2023-05-08

## 2023-05-06 RX ORDER — LIDOCAINE 40 MG/G
CREAM TOPICAL
Status: DISCONTINUED | OUTPATIENT
Start: 2023-05-06 | End: 2023-05-08 | Stop reason: HOSPADM

## 2023-05-06 RX ADMIN — ONDANSETRON 4 MG: 2 INJECTION INTRAMUSCULAR; INTRAVENOUS at 07:37

## 2023-05-06 RX ADMIN — HYDROMORPHONE HYDROCHLORIDE 0.5 MG: 1 INJECTION, SOLUTION INTRAMUSCULAR; INTRAVENOUS; SUBCUTANEOUS at 22:48

## 2023-05-06 RX ADMIN — SODIUM CHLORIDE, POTASSIUM CHLORIDE, SODIUM LACTATE AND CALCIUM CHLORIDE: 600; 310; 30; 20 INJECTION, SOLUTION INTRAVENOUS at 13:18

## 2023-05-06 RX ADMIN — INSULIN ASPART 1 UNITS: 100 INJECTION, SOLUTION INTRAVENOUS; SUBCUTANEOUS at 22:48

## 2023-05-06 RX ADMIN — HYDROMORPHONE HYDROCHLORIDE 0.5 MG: 1 INJECTION, SOLUTION INTRAMUSCULAR; INTRAVENOUS; SUBCUTANEOUS at 07:37

## 2023-05-06 RX ADMIN — INSULIN ASPART 4 UNITS: 100 INJECTION, SOLUTION INTRAVENOUS; SUBCUTANEOUS at 17:33

## 2023-05-06 RX ADMIN — PANTOPRAZOLE SODIUM 40 MG: 40 INJECTION, POWDER, FOR SOLUTION INTRAVENOUS at 17:33

## 2023-05-06 RX ADMIN — SODIUM CHLORIDE, POTASSIUM CHLORIDE, SODIUM LACTATE AND CALCIUM CHLORIDE: 600; 310; 30; 20 INJECTION, SOLUTION INTRAVENOUS at 23:02

## 2023-05-06 RX ADMIN — HYDROMORPHONE HYDROCHLORIDE 0.5 MG: 1 INJECTION, SOLUTION INTRAMUSCULAR; INTRAVENOUS; SUBCUTANEOUS at 10:07

## 2023-05-06 RX ADMIN — SODIUM CHLORIDE 1000 ML: 9 INJECTION, SOLUTION INTRAVENOUS at 07:38

## 2023-05-06 RX ADMIN — INSULIN GLARGINE 12 UNITS: 100 INJECTION, SOLUTION SUBCUTANEOUS at 22:49

## 2023-05-06 ASSESSMENT — ACTIVITIES OF DAILY LIVING (ADL)
CONCENTRATING,_REMEMBERING_OR_MAKING_DECISIONS_DIFFICULTY: NO
ADLS_ACUITY_SCORE: 37
ADLS_ACUITY_SCORE: 37
DRESSING/BATHING_DIFFICULTY: NO
ADLS_ACUITY_SCORE: 37
HEARING_DIFFICULTY_OR_DEAF: NO
ADLS_ACUITY_SCORE: 37
WEAR_GLASSES_OR_BLIND: YES
ADLS_ACUITY_SCORE: 37
ADLS_ACUITY_SCORE: 37
DOING_ERRANDS_INDEPENDENTLY_DIFFICULTY: NO
DIFFICULTY_EATING/SWALLOWING: NO
VISION_MANAGEMENT: READING GLASSES
FALL_HISTORY_WITHIN_LAST_SIX_MONTHS: NO
TOILETING_ISSUES: NO
ADLS_ACUITY_SCORE: 37
ADLS_ACUITY_SCORE: 22
ADLS_ACUITY_SCORE: 37
CHANGE_IN_FUNCTIONAL_STATUS_SINCE_ONSET_OF_CURRENT_ILLNESS/INJURY: NO
WALKING_OR_CLIMBING_STAIRS_DIFFICULTY: NO
DIFFICULTY_COMMUNICATING: NO

## 2023-05-06 NOTE — ED TRIAGE NOTES
Patient reported intermittent pain to left side of her abdomen. Onset around 0600. Patient also reported vomiting 7x today.      Triage Assessment     Row Name 05/06/23 0677       Triage Assessment (Adult)    Airway WDL WDL       Respiratory WDL    Respiratory WDL WDL       Skin Circulation/Temperature WDL    Skin Circulation/Temperature WDL WDL       Cardiac WDL    Cardiac WDL WDL       Peripheral/Neurovascular WDL    Peripheral Neurovascular WDL WDL       Cognitive/Neuro/Behavioral WDL    Cognitive/Neuro/Behavioral WDL WDL

## 2023-05-06 NOTE — H&P
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    Pt has hx of multiple recurrent SBO beginning in 2016, and which have been managed non-operatively since 2018. Unclear etiology of recurrent SBOs; has had multiple evaluations by GI and CRS since SBO onset; most recent GI evaluation in June 2022. Previous workup includes small bowel enteroscopy and colonoscopy in 2016 (unrevealing), and several cross sectional imaging and MRE which are largely normal.  Crohn's workup previously negative. Has hx of chronic constipation since childhood, so dysmotility issues possible; poorly controlled DM2 has raised concern for SIBO. Per previous GI note 6/6/2022, \"Most plausibly his chronic obstructive episodes are 2/2 to a past HIV related enteritis (histoplasmosis or otherwise) that has resolved and left him with multiple segment of SB stricturing. Surgery did not find extensive adhesions on dx laparoscopy in 2018. MR enterography in 2019 similarly did not show active enteritis. No evidence to suggest lymphoma, infection, Whipple's or extremely poor motility as culprit.\" Pt's surgical hx includes lap appendectomy for perforated appendicitis in 2018 (occurred after onset of SBOs), and two laparoscopies in 2016 and 2018 for lysis of adhesions. Most recently, was admitted 3/28-3/31/23, 4/16/23, and 4/18-4/19/23 for SBO w/ medical management; notably he left AMA for latter " "two hospitalizations.     For this hospitalization, patient reported he was in his usual state of health until 1 day PTA when he had numerous episodes of diarrhea.  He states that when he has previously had SBO episodes, he typically has diarrhea the day prior. He ate lightly yesterday because of this and only ate a sandwich.  This morning when he woke up, he had severe left-sided abdominal pain that was somewhat worse than his typical SBO pain in severity though typical in its quality.  He also had significant nausea and vomited at least 7 times.  Says he vomited about \"a week's worth of food.\" He denied fever, chest pain, shortness of breath. Denied hematemesis, melena, BRBPR. Said diarrhea was a green color. He endorsed chills today. Says he was feeling at his health baseline two days ago, has no other sick symptoms aside from above.       ED Course  Vitals: T 98.3, , /93, SpO2 95% on RA   Notable labs: WBC 12.8, glucose 479  Imaging: CT AP w/ high-grade mechanical small bowel obstruction w/ transition point in the anterior R abdomen, which may be on the basis of adhesions.  Interventions: 1L fluid bolus,  0.5 mg IV dilaudid x2, zofran    Complete 10 point review of systems negative unless noted in HPI.     Past Medical History     Past Medical History:   Diagnosis Date     Acute appendicitis with localized peritonitis 1/31/2018     AIDS (H)      Allergic rhinitis due to other allergen     DNS     Anal dysplasia      Chronic abdominal pain      CNS toxoplasmosis (H)      COVID-19 1/11/2022     Diabetes type 2, controlled (H)      GERD (gastroesophageal reflux disease)      Herpes zoster 9/23/2016     History of seizure      History of substance abuse (H)      HIV (human immunodeficiency virus infection) (H)      HLD (hyperlipidemia)      Lung nodules      Periungual wart      Pneumonia 1/6/2019     PTSD (post-traumatic stress disorder)      Sleep apnea     doesn't use CPAP       Past Surgical " History:    Past Surgical History:   Procedure Laterality Date     ANOSCOPY N/A 9/9/2020    Procedure: Exam Under Anesthesia, ANOSCOPY, fulgeration of rectal fissures with Rectal Biopsies;  Surgeon: Thanh Lundberg MD;  Location: [facility] OR     COLONOSCOPY Left 1/22/2016    Procedure: COMBINED COLONOSCOPY, SINGLE OR MULTIPLE BIOPSY/POLYPECTOMY BY BIOPSY;  Surgeon: Calrk Saini MD;  Location: [facility] GI     ESOPHAGOSCOPY, GASTROSCOPY, DUODENOSCOPY (EGD), COMBINED N/A 6/6/2022    Procedure: ESOPHAGOGASTRODUODENOSCOPY, WITH BIOPSY;  Surgeon: Kecia Beíntez MD;  Location: [facility] GI     HC EXPLORE UNDESC TESTIS,INGUIN/SCROTAL       LAPAROSCOPIC APPENDECTOMY N/A 1/31/2018    Procedure: LAPAROSCOPIC APPENDECTOMY;  LAPAROSCOPIC APPENDECTOMY;  Surgeon: Dawn Holt MD;  Location: [facility] OR     LAPAROSCOPY DIAGNOSTIC (GENERAL) N/A 7/26/2016    Procedure: LAPAROSCOPY DIAGNOSTIC (GENERAL);  Surgeon: Susannah Arriaga MD;  Location: [facility] OR     LAPAROSCOPY DIAGNOSTIC (GENERAL) N/A 4/16/2018    Procedure: LAPAROSCOPY DIAGNOSTIC (GENERAL);  Diagnostic laparoscopy and lysis of adhesions;  Surgeon: Prince Dowling MD;  Location: [facility] OR     OPTICAL TRACKING SYSTEM CRANIOTOMY, EXCISE TUMOR, COMBINED Left 4/10/2015    Procedure: COMBINED OPTICAL TRACKING SYSTEM CRANIOTOMY, EXCISE TUMOR;  Surgeon: Mirlande Colmenares MD;  Location: [facility] OR     REPAIR GAMEKEEPER'S THUMB Right 12/2/2016    Procedure: REPAIR LIGAMENT ULNAR COLLATERAL THUMB (GAMEKEEPER'S);  Surgeon: Evin Zamorano MD;  Location:  OR     [facility] NONSPECIFIC PROCEDURE      right forearm fracture       Social History:   Smoking: uses socially  Alcohol: denies  Drug Use:endorses meth use, last use one week ago  Living Situation: currently living in a shelter, has been in this shelter for one year. Says he can return to this shelter after discharge.     Social History     Tobacco Use     Smoking status: Some Days     Packs/day: 0.25     Years: 40.00     Pack  "years: 10.00     Types: Cigarettes     Smokeless tobacco: Former   Substance Use Topics     Alcohol use: No     Alcohol/week: 0.0 standard drinks of alcohol     Comment: Last etoh in 2007     Drug use: Not Currently     Types: Marijuana, Methamphetamines       Family History:    Family History   Problem Relation Age of Onset     Diabetes Brother      Diabetes Father      Alzheimer Disease Father      Unknown/Adopted Mother      Diabetes Paternal Grandfather      Cancer No family hx of         no skin cancer     Skin Cancer No family hx of         no famiy hx of skin cancer     Glaucoma No family hx of      Macular Degeneration No family hx of        Allergies:  Allergies   Allergen Reactions     Fentanyl Blisters     Per pt, after taking this medication had blisters develope     Tylenol [Acetaminophen] Itching     Dulaglutide Rash     Insulin Lispro Rash     Patient reported     Penicillin V Other (See Comments) and Rash     Diffuse maculopapular rash + feels \"high\", per pt.        Medications:  No current facility-administered medications on file prior to encounter.  acetaminophen (TYLENOL) 500 MG tablet, Take 1-2 tablets (500-1,000 mg) by mouth every 6 hours as needed for mild pain  bictegravir-emtricitabine-tenofovir (BIKTARVY) -25 MG per tablet, Take 1 tablet by mouth daily  blood glucose (NO BRAND SPECIFIED) lancets standard, Use to test blood sugar 1 time daily or as directed.  blood glucose (NO BRAND SPECIFIED) test strip, Use to test blood sugar 1 time daily or as directed.  blood glucose monitoring (NO BRAND SPECIFIED) meter device kit, Use to test blood sugar 1 time daily or as directed.  buPROPion (WELLBUTRIN XL) 150 MG 24 hr tablet, Take 1 tablet (150 mg) by mouth daily (Patient not taking: Reported on 3/29/2023)  famotidine (PEPCID) 40 MG tablet, Take 1 tablet (40 mg) by mouth daily  glimepiride (AMARYL) 4 MG tablet, Take 1 tablet (4 mg) by mouth 2 times daily (before meals) (Patient not taking: " Reported on 3/29/2023)  ibuprofen (ADVIL/MOTRIN) 600 MG tablet, Take 1 tablet (600 mg) by mouth every 6 hours as needed for moderate pain (4-6)  levofloxacin (LEVAQUIN) 500 MG tablet, Take 1 tablet (500 mg) by mouth daily (Patient not taking: Reported on 3/29/2023)  metFORMIN (FORTAMET) 1000 MG 24 hr tablet, Take 2 tablets (2,000 mg) by mouth daily (with dinner) (Patient taking differently: Take 3,000 mg by mouth daily (with dinner))  sitagliptin (JANUVIA) 100 MG tablet, Take 1 tablet (100 mg) by mouth daily (Patient not taking: Reported on 3/29/2023)  sulfamethoxazole-trimethoprim (BACTRIM DS) 800-160 MG tablet, Take 1 tablet by mouth 2 times daily (Patient not taking: Reported on 3/29/2023)  [DISCONTINUED] NOVOLOG FLEXPEN 100 UNIT/ML soln, Inject 5 Units Subcutaneous 3 times daily (with meals)        (Not in a hospital admission)      Physical Exam     Temp:  [98.3  F (36.8  C)] 98.3  F (36.8  C)  Pulse:  [] 104  Resp:  [16-22] 16  BP: (127-151)/(74-97) 133/93  SpO2:  [74 %-100 %] 95 %    GENERAL APPEARANCE: eyes closed, tired appearing, mildly uncomfortable appearing, nontoxic   HEENT: NCAT, MMM. NG tube in place   CARDIAC: RRR. Normal S1, S2. No M/R/G.   LUNGS: CTA w/o rales, rhonchi, wheezing or diminished breath sounds.  EXTREMITIES: No significant lower extremity edema  ABDOMEN: Soft, mild distention, nontender. No guarding or rebound. No masses.   (exam done after recent IV dilaudid given)   NEUROLOGICAL: AxO x3. CN II-XII grossly intact. Moving all extremities spontaneously  SKIN: No rash on exposed skin  PSYCH: Normal Affect and mood for situation. Answers questions appropriately    Diagnostic Studies:  Recent Labs   Lab 05/06/23  0707   WBC 12.8*   HGB 15.2   MCV 83      *   POTASSIUM 4.5   CHLORIDE 94*   CO2 21*   BUN 22.2   CR 0.74   ANIONGAP 19*   LINDA 11.5*   *   ALBUMIN 4.7   PROTTOTAL 8.2   BILITOTAL 0.9   ALKPHOS 165*   ALT 21   AST 28   LIPASE 50       Recent Results (from  the past 24 hour(s))   CT Abdomen Pelvis w/o Contrast    Narrative    EXAM: CT ABDOMEN PELVIS W/O CONTRAST  LOCATION: [facility]  DATE/TIME: 5/6/2023 8:21 AM CDT    INDICATION: LLQ pain  COMPARISON: CT abdomen pelvis 04/16/2023.  TECHNIQUE: CT scan of the abdomen and pelvis was performed without IV contrast. Multiplanar reformats were obtained. Dose reduction techniques were used.  CONTRAST: None.    FINDINGS:   LOWER CHEST: Normal.    HEPATOBILIARY: Tiny gallstones. Normal noncontrast liver.    PANCREAS: Normal.    SPLEEN: Normal.    ADRENAL GLANDS: Normal.    KIDNEYS/BLADDER: Normal.    BOWEL: Marked distention of the stomach. Mild dilatation of proximal small bowel extending to a focal transition point in the anterior right abdomen (series 2, images 39-46), consistent with a small bowel obstruction. No free air. No abscess. No free   fluid.    LYMPH NODES: Normal.    VASCULATURE: Mild atherosclerotic calcifications.    PELVIC ORGANS: Penile prosthesis.    MUSCULOSKELETAL: Normal.      Impression    IMPRESSION:   High-grade mechanical small bowel obstruction with transition point in the anterior right abdomen, which may be on the basis of adhesions.   XR Chest Port 1 View    Narrative    XR CHEST PORT 1 VIEW  5/6/2023 12:56 PM      HISTORY: NG tube placement verification    COMPARISON: Chest x-ray 3/13/2023, 12/17/2022.    FINDINGS:   Portable AP 10 degree upright chest x-ray. Gastric tube tip and  sidehole terminating over the stomach.    Trachea is midline. Cardiac silhouette is within normal limits.  Pulmonary vasculature is distinct. No focal airspace opacity, pleural  effusion, pneumothorax.    Bony structures appear intact.      Impression    IMPRESSION:   1.  Gastric tube tip and sidehole terminating over the stomach.  2.  Low lung volume. Lungs are clear.    I have personally reviewed the examination and initial interpretation  and I agree with the  "findings.    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     #Acute high-grade mechanical small bowel obstruction of unclear etiology  #leukocytosis, mild  Patient presented with acute onset of L sided abdominal pain, nausea, and vomiting x 6 hours, with a hx of 1 day of significant diarrhea on day prior.  Workup revealed largely unremarkable labs, with imaging revealing high-grade mechanical small bowel obstruction with transition point in the anterior right abdomen, possibly 2/2 adhesions. No free air, abscess, or free fluid. Surgery consulted and recommended medical management and evaluation with gastrografin challenge.   - surgery consulted, appreciate recs  - gastrografin challenge tomorrow 4AM, 12 pm AXR  - LR 100ml/hr  - NPO w/ ice chips, bowel rest  - NG to suction  - serial abd exams by surgery team   - pain: IV dilaudid 0.5 mg Q3H PRN, tylenol 975 mg Q6H PRN  - nausea: PRN zofran   - lipase 50  - WBC 12.8, mild leukocytosis   - will continue to monitor. Hold off on infectious workup for now given absence of other systemic signs of illness   - no abx indicated  - Per past GI notes \"Most plausibly his chronic obstructive episodes are 2/2 to a past HIV related enteritis (histoplasmosis or otherwise) that has resolved and left him with multiple segment of SB stricturing. Surgery did not find extensive adhesions on dx laparoscopy in 2018. MR enterography in 2019 similarly did not show active enteritis. No evidence to suggest lymphoma, infection, Whipple's or " "extremely poor motility as culprit.\"      #DM2, poorly controlled, a1c 11.5%  #Hyperglycemia  Glucose 479 on admit. Has hx of difficulty with regimen after discharge for multiple reasons (no access to refrigerated insulin storage, side effects from oral meds, does not have much flexibility in available food choices etc). Would benefit from reconnection with endocrine, ideally who can follow as an outpatient.  - endocrine consult, appreciate recs  - start Lantus 12 units  -mSSI Q4H while NPO  -hypoglycemia protocol              -Hold PTA oral diabetic medication for now    #Pseudohyonatremia  Na 134 on admit, Na 140-143 corrected for significant hyperglycemia (glucose 479)  - correct glucose w/ insulin as above  - monitor    #Hypercalcemia  Ca 11.5 on admit. Does not have hx of hypercalcemia w/ recent calcium level 8-10, most recent 8.6 ~2.5 weeks prior to admit. May have component of hemoconcentration in s/o significant emesis.  - fluids as above  - recheck in AM  - if persistent, pursue further workup     #HIV  - PTA biktarvy  - check CD4 count  - due for ID follow-up 4/2023 (last saw ID 1/2023)    #GERD  Endorsing active symptoms  - cont PTA famotidine  - IV pantoprazole daily until no longer NPO, then transition to oral PPI     Discharge needs:  Due for ID follow-up for HIV 4/2023    Diet: NPO w/ ice chips  Fluids: LR 100ml/hr  Prophylaxis:   DVT: not indicated, PADUA score 0-1   GI: not indicated  Code: Full  Disposition: likely home    Patient to staffed with Dr. Whitt.    Iris Garza MD  Internal Medicine, PGY2  [phone]    "

## 2023-05-06 NOTE — ED NOTES
Bed: ED17  Expected date:   Expected time:   Means of arrival:   Comments:  [employer] [facility] ([place])

## 2023-05-06 NOTE — ED PROVIDER NOTES
Patient was transferred from [facility] ED for surgical consultation.  Patient is admitted to internal medicine service, however, no inpatient beds are available at this time.  General surgery was consulted and they will evaluate the patient the emergency department.     Braulio Wayne MD  05/06/23 1830

## 2023-05-06 NOTE — ED NOTES
Bed: ED04  Expected date: 5/6/23  Expected time: 6:17 AM  Means of arrival: Ambulance  Comments:  Radha 413 56 yo ABD pain

## 2023-05-06 NOTE — ED PROVIDER NOTES
ED Provider Note  [facility]      History     Chief Complaint   Patient presents with     Abdominal Pain     HPI     History was obtained through a .    Andrew Collier is a 57 year old male who has a history of GERD, chronic abdominal pain, appendicitis, diabetes, and pneumonia.  He presents with acute onset of left lower quadrant abdominal pain associate with nausea and vomiting.  He reports vomiting x7 today.  There is no hematemesis.  He denies melena or bright red blood per rectum.  He has no fever.  He reports some chills.  He denies dysuria or urinary frequency.    Past Medical History  Past Medical History:   Diagnosis Date     Acute appendicitis with localized peritonitis 1/31/2018     AIDS (H)      Allergic rhinitis due to other allergen     DNS     Anal dysplasia      Chronic abdominal pain      CNS toxoplasmosis (H)      COVID-19 1/11/2022     Diabetes type 2, controlled (H)      GERD (gastroesophageal reflux disease)      Herpes zoster 9/23/2016     History of seizure      History of substance abuse (H)      HIV (human immunodeficiency virus infection) (H)      HLD (hyperlipidemia)      Lung nodules      Periungual wart      Pneumonia 1/6/2019     PTSD (post-traumatic stress disorder)      Sleep apnea     doesn't use CPAP     Past Surgical History:   Procedure Laterality Date     ANOSCOPY N/A 9/9/2020    Procedure: Exam Under Anesthesia, ANOSCOPY, fulgeration of rectal fissures with Rectal Biopsies;  Surgeon: Thanh Lundberg MD;  Location: [facility] OR     COLONOSCOPY Left 1/22/2016    Procedure: COMBINED COLONOSCOPY, SINGLE OR MULTIPLE BIOPSY/POLYPECTOMY BY BIOPSY;  Surgeon: Clark Saini MD;  Location: [facility] GI     ESOPHAGOSCOPY, GASTROSCOPY, DUODENOSCOPY (EGD), COMBINED N/A 6/6/2022    Procedure: ESOPHAGOGASTRODUODENOSCOPY, WITH BIOPSY;  Surgeon: Kecia Benítez MD;  Location: [facility] GI     HC EXPLORE UNDESC TESTIS,INGUIN/SCROTAL       LAPAROSCOPIC  "APPENDECTOMY N/A 1/31/2018    Procedure: LAPAROSCOPIC APPENDECTOMY;  LAPAROSCOPIC APPENDECTOMY;  Surgeon: Dawn Holt MD;  Location: [facility] OR     LAPAROSCOPY DIAGNOSTIC (GENERAL) N/A 7/26/2016    Procedure: LAPAROSCOPY DIAGNOSTIC (GENERAL);  Surgeon: Susannah Arriaga MD;  Location: [facility] OR     LAPAROSCOPY DIAGNOSTIC (GENERAL) N/A 4/16/2018    Procedure: LAPAROSCOPY DIAGNOSTIC (GENERAL);  Diagnostic laparoscopy and lysis of adhesions;  Surgeon: Prince Dowling MD;  Location: [facility] OR     OPTICAL TRACKING SYSTEM CRANIOTOMY, EXCISE TUMOR, COMBINED Left 4/10/2015    Procedure: COMBINED OPTICAL TRACKING SYSTEM CRANIOTOMY, EXCISE TUMOR;  Surgeon: Mirlande Colmenares MD;  Location: [facility] OR     REPAIR GAMEKEEPER'S THUMB Right 12/2/2016    Procedure: REPAIR LIGAMENT ULNAR COLLATERAL THUMB (GAMEKEEPER'S);  Surgeon: Evin Zamorano MD;  Location: [facility] OR     ZZC NONSPECIFIC PROCEDURE      right forearm fracture     acetaminophen (TYLENOL) 500 MG tablet  bictegravir-emtricitabine-tenofovir (BIKTARVY) -25 MG per tablet  blood glucose (NO BRAND SPECIFIED) lancets standard  blood glucose (NO BRAND SPECIFIED) test strip  blood glucose monitoring (NO BRAND SPECIFIED) meter device kit  buPROPion (WELLBUTRIN XL) 150 MG 24 hr tablet  famotidine (PEPCID) 40 MG tablet  glimepiride (AMARYL) 4 MG tablet  ibuprofen (ADVIL/MOTRIN) 600 MG tablet  levofloxacin (LEVAQUIN) 500 MG tablet  metFORMIN (FORTAMET) 1000 MG 24 hr tablet  sitagliptin (JANUVIA) 100 MG tablet  sulfamethoxazole-trimethoprim (BACTRIM DS) 800-160 MG tablet      Allergies   Allergen Reactions     Fentanyl Blisters     Per pt, after taking this medication had blisters develope     Tylenol [Acetaminophen] Itching     Dulaglutide Rash     Insulin Lispro Rash     Patient reported     Penicillin V Other (See Comments) and Rash     Diffuse maculopapular rash + feels \"high\", per pt.      Family History  Family History   Problem Relation Age of Onset     " "Diabetes Brother      Diabetes Father      Alzheimer Disease Father      Unknown/Adopted Mother      Diabetes Paternal Grandfather      Cancer No family hx of         no skin cancer     Skin Cancer No family hx of         no famiy hx of skin cancer     Glaucoma No family hx of      Macular Degeneration No family hx of      Social History   Social History     Tobacco Use     Smoking status: Some Days     Packs/day: 0.25     Years: 40.00     Pack years: 10.00     Types: Cigarettes     Smokeless tobacco: Former   Substance Use Topics     Alcohol use: No     Alcohol/week: 0.0 standard drinks of alcohol     Comment: Last etoh in 2007     Drug use: Not Currently     Types: Marijuana, Methamphetamines      Past medical history, past surgical history, medications, allergies, family history, and social history were reviewed with the patient. No additional pertinent items.      A medically appropriate review of systems was performed with pertinent positives and negatives noted in the HPI, and all other systems negative.    Physical Exam   BP: (!) 151/97  Pulse: 95  Resp: 22  Height: 160 cm (5' 3\")  SpO2: 100 %  Physical Exam  Vitals and nursing note reviewed.   Constitutional:       General: He is in acute distress (appears uncomfortable).      Appearance: He is well-developed. He is not diaphoretic.   HENT:      Head: Normocephalic and atraumatic.   Eyes:      General: No scleral icterus.     Pupils: Pupils are equal, round, and reactive to light.   Cardiovascular:      Rate and Rhythm: Normal rate and regular rhythm.      Heart sounds: Normal heart sounds. No murmur heard.  Pulmonary:      Effort: No respiratory distress.      Breath sounds: No stridor. No wheezing or rales.   Abdominal:      General: Bowel sounds are normal.      Palpations: Abdomen is soft.      Tenderness: There is no abdominal tenderness.   Musculoskeletal:         General: No tenderness.      Cervical back: Normal range of motion and neck supple. "   Skin:     General: Skin is warm and dry.      Coloration: Skin is not pale.      Findings: No erythema or rash.   Neurological:      Mental Status: He is alert and oriented to person, place, and time.      Sensory: No sensory deficit.      Coordination: Coordination normal.           ED Course, Procedures, & Data      Procedures             Results for orders placed or performed during the hospital encounter of 05/06/23   CT Abdomen Pelvis w/o Contrast     Status: None    Narrative    EXAM: CT ABDOMEN PELVIS W/O CONTRAST  LOCATION: [facility]  DATE/TIME: 5/6/2023 8:21 AM CDT    INDICATION: LLQ pain  COMPARISON: CT abdomen pelvis 04/16/2023.  TECHNIQUE: CT scan of the abdomen and pelvis was performed without IV contrast. Multiplanar reformats were obtained. Dose reduction techniques were used.  CONTRAST: None.    FINDINGS:   LOWER CHEST: Normal.    HEPATOBILIARY: Tiny gallstones. Normal noncontrast liver.    PANCREAS: Normal.    SPLEEN: Normal.    ADRENAL GLANDS: Normal.    KIDNEYS/BLADDER: Normal.    BOWEL: Marked distention of the stomach. Mild dilatation of proximal small bowel extending to a focal transition point in the anterior right abdomen (series 2, images 39-46), consistent with a small bowel obstruction. No free air. No abscess. No free   fluid.    LYMPH NODES: Normal.    VASCULATURE: Mild atherosclerotic calcifications.    PELVIC ORGANS: Penile prosthesis.    MUSCULOSKELETAL: Normal.      Impression    IMPRESSION:   High-grade mechanical small bowel obstruction with transition point in the anterior right abdomen, which may be on the basis of adhesions.   Comprehensive metabolic panel     Status: Abnormal   Result Value Ref Range    Sodium 134 (L) 136 - 145 mmol/L    Potassium 4.5 3.4 - 5.3 mmol/L    Chloride 94 (L) 98 - 107 mmol/L    Carbon Dioxide (CO2) 21 (L) 22 - 29 mmol/L    Anion Gap 19 (H) 7 - 15 mmol/L    Urea Nitrogen 22.2 8.0 - 23.0 mg/dL     Creatinine 0.74 0.67 - 1.17 mg/dL    Calcium 11.5 (H) 8.6 - 10.0 mg/dL    Glucose 479 (H) 70 - 99 mg/dL    Alkaline Phosphatase 165 (H) 40 - 129 U/L    AST 28 10 - 50 U/L    ALT 21 10 - 50 U/L    Protein Total 8.2 6.4 - 8.3 g/dL    Albumin 4.7 3.5 - 5.2 g/dL    Bilirubin Total 0.9 <=1.2 mg/dL    GFR Estimate >90 >60 mL/min/1.73m2   Lipase     Status: Normal   Result Value Ref Range    Lipase 50 13 - 60 U/L   CBC with platelets and differential     Status: Abnormal   Result Value Ref Range    WBC Count 12.8 (H) 4.0 - 11.0 10e3/uL    RBC Count 5.46 4.40 - 5.90 10e6/uL    Hemoglobin 15.2 13.3 - 17.7 g/dL    Hematocrit 45.2 40.0 - 53.0 %    MCV 83 78 - 100 fL    MCH 27.8 26.5 - 33.0 pg    MCHC 33.6 31.5 - 36.5 g/dL    RDW 14.0 10.0 - 15.0 %    Platelet Count 397 150 - 450 10e3/uL    % Neutrophils 80 %    % Lymphocytes 12 %    % Monocytes 5 %    % Eosinophils 1 %    % Basophils 1 %    % Immature Granulocytes 1 %    NRBCs per 100 WBC 0 <1 /100    Absolute Neutrophils 10.4 (H) 1.6 - 8.3 10e3/uL    Absolute Lymphocytes 1.5 0.8 - 5.3 10e3/uL    Absolute Monocytes 0.6 0.0 - 1.3 10e3/uL    Absolute Eosinophils 0.1 0.0 - 0.7 10e3/uL    Absolute Basophils 0.1 0.0 - 0.2 10e3/uL    Absolute Immature Granulocytes 0.1 <=0.4 10e3/uL    Absolute NRBCs 0.0 10e3/uL   Extra Tube     Status: None    Narrative    The following orders were created for panel order Extra Tube.  Procedure                               Abnormality         Status                     ---------                               -----------         ------                     Extra Purple Top Tube[[id number]]                            Final result                 Please view results for these tests on the individual orders.   Extra Purple Top Tube     Status: None   Result Value Ref Range    Hold Specimen JI    CBC with platelets differential     Status: Abnormal    Narrative    The following orders were created for panel order CBC with platelets  differential.  Procedure                               Abnormality         Status                     ---------                               -----------         ------                     CBC with platelets and d...[[id number]]  Abnormal            Final result                 Please view results for these tests on the individual orders.     Medications   0.9% sodium chloride BOLUS (1,000 mLs Intravenous $New Bag 5/6/23 0738)   ondansetron (ZOFRAN) injection 4 mg (4 mg Intravenous $Given 5/6/23 0737)   HYDROmorphone (PF) (DILAUDID) injection 0.5 mg (0.5 mg Intravenous $Given 5/6/23 0737)       CT Abdomen Pelvis w/o Contrast   Final Result   IMPRESSION:    High-grade mechanical small bowel obstruction with transition point in the anterior right abdomen, which may be on the basis of adhesions.             Critical care was not performed.     Medical Decision Making  The patient's presentation was of high complexity (an acute health issue posing potential threat to life or bodily function).    The patient's evaluation involved:  ordering and/or review of 3+ test(s) in this encounter (see separate area of note for details)  review of 3+ test result(s) ordered prior to this encounter (see separate area of note for details)  independent interpretation of testing performed by another health professional (see separate area of note for details)  discussion of management or test interpretation with another health professional (see separate area of note for details)    The patient's management necessitated moderate risk (limitations due to social determinants of health ([place] speaking)), high risk (a parenteral controlled substance) and high risk (a decision regarding hospitalization).      Assessment & Plan    Patient is a 57-year-old male with a history of GERD, chronic abdominal pain, appendicitis, diabetes, and pneumonia.  He presents with acute onset of diffuse abdominal pain associate with nausea and vomiting.  He has  had vomiting x7 today.  He had an additional large episode of vomiting while in the emergency department.    On exam, patient appears uncomfortable.  He has a blood pressure 151/97 with a pulse rate of 95 and respiratory rate of 22.  Oxygen saturations are 100%.    He has diffuse abdominal pain with slight distention.  He has no peritoneal signs.    IV was established and bloods were drawn.  Patient was given IV fluids, antiemetics, and narcotics.  A CT scan of the abdomen will also be obtained.    Patient's white count returned at 12.8 with a hemoglobin of 15.2.  A comprehensive metabolic profile shows an elevated calcium at 11.5 and alk phos of 165.  His sodium is 134 and glucose is 479.    Patient's glucose 2 weeks ago was 285 and is at alk phos was 111.    Patient's hemoglobin 2 weeks ago was 12.2 and his white count was 4.7.    CT scan of the abdomen returns with a high-grade mechanical small bowel obstruction with transition point in the anterior right abdomen possibly related to adhesions.  This was independently reviewed by me which confirmed the obstruction without evidence of free air.    An NG tube will be placed for decompression.  Patient required parenteral pain medications for pain management.    I discussed the case with the triage hospitalist who recommended surgical consultation and transfer to the [place] for that evaluation as there are currently no medical beds on the [place] for patient be directly admitted and evaluated by general surgery.  Transfer will expedite our ability to get a surgical consultation.  I spoke with general surgery who will see the patient in the emergency department once he arrives.  I spoke to Dr. Wayne from the emergency department who accepted the patient for transfer.    I have reviewed the nursing notes. I have reviewed the findings, diagnosis, plan and need for follow up with the patient.    New Prescriptions    No medications on file       Final diagnoses:    Small bowel obstruction (H)       Aguila Justin MD  [facility] EMERGENCY DEPARTMENT  5/6/2023     Aguila Justin MD  05/07/23 1927

## 2023-05-06 NOTE — CONSULTS
General Surgery Consult Note  05/06/2023     Date of admission: 5/6/2023    Reason for Consult: SBO    Consulting Service: medicine      Assessment/Plan:  58yo man with who presents with recurrent SBO. Hemodynamically stable and non-peritonitic on exam. Will try medical management for now with NG decompression and bowel rest, although he has required diagnostic laparoscopy in the past for lysis of adhesions (most recently 2018).     - Admit to medicine  - NPO, NGT, bowel rest  - Serial abdominal exams by surgery team  - Strict I/O  - Okay for DVT ppx  - No abx indicated  - gastrografin challenge tomorrow 4am (ordered), 12pm AXR  - Remainder of cares per primary team    Discussed with staff surgeon, Dr. Beltran.     Tosin Hernández MD  Surgery PGY-2     -----------------------------------------------------------  CC: abdominal pain    HPI: 58yo man with h/o HIV, methamphetamine use disorder, and recurrent bowel obstruction who presents with abdominal pain, nausea/vomiting. He has been admitted multiple times recently including 3/28-3/31, 4/16 (left AMA), and 4/18-4/19 (left AMA). He has had lap appy in the past, as well as two diagnostic laparoscopies with lysis of adhesions in 2016 and 2018 for SBO. He has not had abdominal surgery since 2018 and his most recent episodes of SBO have been managed nonoperatively.     ROS: Negative except mentioned in HPI.     Past Medical Hx:  Past Medical History:   Diagnosis Date     Acute appendicitis with localized peritonitis 1/31/2018     AIDS (H)      Allergic rhinitis due to other allergen     DNS     Anal dysplasia      Chronic abdominal pain      CNS toxoplasmosis (H)      COVID-19 1/11/2022     Diabetes type 2, controlled (H)      GERD (gastroesophageal reflux disease)      Herpes zoster 9/23/2016     History of seizure      History of substance abuse (H)      HIV (human immunodeficiency virus infection) (H)      HLD (hyperlipidemia)      Lung nodules      Periungual  wart      Pneumonia 1/6/2019     PTSD (post-traumatic stress disorder)      Sleep apnea     doesn't use CPAP        Medications:  Prior to Admission medications    Medication Sig Last Dose Taking? Auth Provider Long Term End Date   acetaminophen (TYLENOL) 500 MG tablet Take 1-2 tablets (500-1,000 mg) by mouth every 6 hours as needed for mild pain   Esther Mauro MD No    bictegravir-emtricitabine-tenofovir (BIKTARVY) -25 MG per tablet Take 1 tablet by mouth daily   Anna Dailey MD     blood glucose (NO BRAND SPECIFIED) lancets standard Use to test blood sugar 1 time daily or as directed.   Anna Dailey MD     blood glucose (NO BRAND SPECIFIED) test strip Use to test blood sugar 1 time daily or as directed.   Anna Dailey MD     blood glucose monitoring (NO BRAND SPECIFIED) meter device kit Use to test blood sugar 1 time daily or as directed.   Anna Dailey MD     buPROPion (WELLBUTRIN XL) 150 MG 24 hr tablet Take 1 tablet (150 mg) by mouth daily  Patient not taking: Reported on 3/29/2023   Esther Mauro MD Yes    famotidine (PEPCID) 40 MG tablet Take 1 tablet (40 mg) by mouth daily   Esther Mauro MD No    glimepiride (AMARYL) 4 MG tablet Take 1 tablet (4 mg) by mouth 2 times daily (before meals)  Patient not taking: Reported on 3/29/2023   Esther Mauro MD Yes    ibuprofen (ADVIL/MOTRIN) 600 MG tablet Take 1 tablet (600 mg) by mouth every 6 hours as needed for moderate pain (4-6)   Esther Mauro MD No    levofloxacin (LEVAQUIN) 500 MG tablet Take 1 tablet (500 mg) by mouth daily  Patient not taking: Reported on 3/29/2023   Esther Mauro MD No    metFORMIN (FORTAMET) 1000 MG 24 hr tablet Take 2 tablets (2,000 mg) by mouth daily (with dinner)  Patient taking differently: Take 3,000 mg by mouth daily (with dinner)   Esther Mauro MD Yes    sitagliptin (JANUVIA) 100 MG tablet Take 1 tablet (100 mg) by mouth  daily  Patient not taking: Reported on 3/29/2023   Esther Mauro MD Yes    sulfamethoxazole-trimethoprim (BACTRIM DS) 800-160 MG tablet Take 1 tablet by mouth 2 times daily  Patient not taking: Reported on 3/29/2023   Esther Mauro MD     NOVOLOG FLEXPEN 100 UNIT/ML soln Inject 5 Units Subcutaneous 3 times daily (with meals)   Begurpreet, Parish, DO Yes 5/29/22       Past Surgical Hx:  Past Surgical History:   Procedure Laterality Date     ANOSCOPY N/A 9/9/2020    Procedure: Exam Under Anesthesia, ANOSCOPY, fulgeration of rectal fissures with Rectal Biopsies;  Surgeon: Thanh Lundberg MD;  Location: [facility]     COLONOSCOPY Left 1/22/2016    Procedure: COMBINED COLONOSCOPY, SINGLE OR MULTIPLE BIOPSY/POLYPECTOMY BY BIOPSY;  Surgeon: Clark Saini MD;  Location: [facility]     ESOPHAGOSCOPY, GASTROSCOPY, DUODENOSCOPY (EGD), COMBINED N/A 6/6/2022    Procedure: ESOPHAGOGASTRODUODENOSCOPY, WITH BIOPSY;  Surgeon: Kecia Benítez MD;  Location: [facility]     HC EXPLORE UNDESC TESTIS,INGUIN/SCROTAL       LAPAROSCOPIC APPENDECTOMY N/A 1/31/2018    Procedure: LAPAROSCOPIC APPENDECTOMY;  LAPAROSCOPIC APPENDECTOMY;  Surgeon: Dawn Holt MD;  Location: [facility]     LAPAROSCOPY DIAGNOSTIC (GENERAL) N/A 7/26/2016    Procedure: LAPAROSCOPY DIAGNOSTIC (GENERAL);  Surgeon: Susannah Arriaga MD;  Location: [facility]     LAPAROSCOPY DIAGNOSTIC (GENERAL) N/A 4/16/2018    Procedure: LAPAROSCOPY DIAGNOSTIC (GENERAL);  Diagnostic laparoscopy and lysis of adhesions;  Surgeon: Prince Dowling MD;  Location: [facility]     OPTICAL TRACKING SYSTEM CRANIOTOMY, EXCISE TUMOR, COMBINED Left 4/10/2015    Procedure: COMBINED OPTICAL TRACKING SYSTEM CRANIOTOMY, EXCISE TUMOR;  Surgeon: Mirlande Colmenares MD;  Location: [facility]     REPAIR GAMEKEEPER'S THUMB Right 12/2/2016    Procedure: REPAIR LIGAMENT ULNAR COLLATERAL THUMB (GAMEKEEPER'S);  Surgeon: Evin Zamorano MD;  Location: [facility]     ZZC NONSPECIFIC PROCEDURE      right  forearm fracture        Family Hx:  Family History   Problem Relation Age of Onset     Diabetes Brother      Diabetes Father      Alzheimer Disease Father      Unknown/Adopted Mother      Diabetes Paternal Grandfather      Cancer No family hx of         no skin cancer     Skin Cancer No family hx of         no famiy hx of skin cancer     Glaucoma No family hx of      Macular Degeneration No family hx of         Social Hx:  Social History     Tobacco Use     Smoking status: Some Days     Packs/day: 0.25     Years: 40.00     Pack years: 10.00     Types: Cigarettes     Smokeless tobacco: Former   Substance Use Topics     Alcohol use: No     Alcohol/week: 0.0 standard drinks of alcohol     Comment: Last etoh in 2007     Drug use: Not Currently     Types: Marijuana, Methamphetamines        Vitals: Temp: 98.3  F (36.8  C) Temp src: Oral BP: (!) 133/93 Pulse: 104   Resp: 16 SpO2: 95 % O2 Device: None (Room air)       Exam:  General: awake, interactive, NAD  Resp: breathing comfortably on room air, no respiratory distress  CV: RR, appears well perfused  GI: abdomen soft, moderately distended, mildly tender to palpation of LLQ without rebound or guarding   Extremities: wwp  Neuro: A&O, cranial nerves grossly intact  Psych: appropriate affect    Labs/Imaging:  Results for orders placed or performed during the hospital encounter of 05/06/23 (from the past 24 hour(s))   Extra Tube    Narrative    The following orders were created for panel order Extra Tube.  Procedure                               Abnormality         Status                     ---------                               -----------         ------                     Extra Purple Top Tube[[id number]]                            Final result                 Please view results for these tests on the individual orders.   Extra Purple Top Tube   Result Value Ref Range    Hold Specimen JIC    CBC with platelets differential    Narrative    The following orders were  created for panel order CBC with platelets differential.  Procedure                               Abnormality         Status                     ---------                               -----------         ------                     CBC with platelets and d...[[id number]]  Abnormal            Final result                 Please view results for these tests on the individual orders.   Comprehensive metabolic panel   Result Value Ref Range    Sodium 134 (L) 136 - 145 mmol/L    Potassium 4.5 3.4 - 5.3 mmol/L    Chloride 94 (L) 98 - 107 mmol/L    Carbon Dioxide (CO2) 21 (L) 22 - 29 mmol/L    Anion Gap 19 (H) 7 - 15 mmol/L    Urea Nitrogen 22.2 8.0 - 23.0 mg/dL    Creatinine 0.74 0.67 - 1.17 mg/dL    Calcium 11.5 (H) 8.6 - 10.0 mg/dL    Glucose 479 (H) 70 - 99 mg/dL    Alkaline Phosphatase 165 (H) 40 - 129 U/L    AST 28 10 - 50 U/L    ALT 21 10 - 50 U/L    Protein Total 8.2 6.4 - 8.3 g/dL    Albumin 4.7 3.5 - 5.2 g/dL    Bilirubin Total 0.9 <=1.2 mg/dL    GFR Estimate >90 >60 mL/min/1.73m2   Lipase   Result Value Ref Range    Lipase 50 13 - 60 U/L   CBC with platelets and differential   Result Value Ref Range    WBC Count 12.8 (H) 4.0 - 11.0 10e3/uL    RBC Count 5.46 4.40 - 5.90 10e6/uL    Hemoglobin 15.2 13.3 - 17.7 g/dL    Hematocrit 45.2 40.0 - 53.0 %    MCV 83 78 - 100 fL    MCH 27.8 26.5 - 33.0 pg    MCHC 33.6 31.5 - 36.5 g/dL    RDW 14.0 10.0 - 15.0 %    Platelet Count 397 150 - 450 10e3/uL    % Neutrophils 80 %    % Lymphocytes 12 %    % Monocytes 5 %    % Eosinophils 1 %    % Basophils 1 %    % Immature Granulocytes 1 %    NRBCs per 100 WBC 0 <1 /100    Absolute Neutrophils 10.4 (H) 1.6 - 8.3 10e3/uL    Absolute Lymphocytes 1.5 0.8 - 5.3 10e3/uL    Absolute Monocytes 0.6 0.0 - 1.3 10e3/uL    Absolute Eosinophils 0.1 0.0 - 0.7 10e3/uL    Absolute Basophils 0.1 0.0 - 0.2 10e3/uL    Absolute Immature Granulocytes 0.1 <=0.4 10e3/uL    Absolute NRBCs 0.0 10e3/uL   CT Abdomen Pelvis w/o Contrast    Narrative    EXAM: CT  ABDOMEN PELVIS W/O CONTRAST  LOCATION: [facility]  DATE/TIME: 5/6/2023 8:21 AM CDT    INDICATION: LLQ pain  COMPARISON: CT abdomen pelvis 04/16/2023.  TECHNIQUE: CT scan of the abdomen and pelvis was performed without IV contrast. Multiplanar reformats were obtained. Dose reduction techniques were used.  CONTRAST: None.    FINDINGS:   LOWER CHEST: Normal.    HEPATOBILIARY: Tiny gallstones. Normal noncontrast liver.    PANCREAS: Normal.    SPLEEN: Normal.    ADRENAL GLANDS: Normal.    KIDNEYS/BLADDER: Normal.    BOWEL: Marked distention of the stomach. Mild dilatation of proximal small bowel extending to a focal transition point in the anterior right abdomen (series 2, images 39-46), consistent with a small bowel obstruction. No free air. No abscess. No free   fluid.    LYMPH NODES: Normal.    VASCULATURE: Mild atherosclerotic calcifications.    PELVIC ORGANS: Penile prosthesis.    MUSCULOSKELETAL: Normal.      Impression    IMPRESSION:   High-grade mechanical small bowel obstruction with transition point in the anterior right abdomen, which may be on the basis of adhesions.     *Note: Due to a large number of results and/or encounters for the requested time period, some results have not been displayed. A complete set of results can be found in Results Review.

## 2023-05-06 NOTE — PROGRESS NOTES
Shift: [id number]    V/S & pain: VSS on RA, pt denied pain this shift   Neuro: A/O x4, calm and cooperative   GI/: no occurrences this shift, still needs UA. NG on low intermittent suction   Nutrition: NPO except icechips   Lines/drains: L. PIV infusing LR's @100 ml/hr   Activity: up ad lexi     Events this shift: no acute events this shift. Pt remains vitally stable on  RA. Patient still needing UA. continue w/ poc, awaiting safe discharge plans

## 2023-05-07 ENCOUNTER — APPOINTMENT (OUTPATIENT)
Dept: GENERAL RADIOLOGY | Facility: CLINIC | Age: 58
End: 2023-05-07
Payer: COMMERCIAL

## 2023-05-07 LAB
ALBUMIN SERPL BCG-MCNC: 3.8 G/DL (ref 3.5–5.2)
ALBUMIN UR-MCNC: 30 MG/DL
ALP SERPL-CCNC: 80 U/L (ref 40–129)
ALT SERPL W P-5'-P-CCNC: 10 U/L (ref 10–50)
ANION GAP SERPL CALCULATED.3IONS-SCNC: 10 MMOL/L (ref 7–15)
APPEARANCE UR: CLEAR
AST SERPL W P-5'-P-CCNC: 14 U/L (ref 10–50)
BILIRUB SERPL-MCNC: 0.8 MG/DL
BILIRUB UR QL STRIP: NEGATIVE
BUN SERPL-MCNC: 20.8 MG/DL (ref 8–23)
CALCIUM SERPL-MCNC: 9.5 MG/DL (ref 8.6–10)
CHLORIDE SERPL-SCNC: 106 MMOL/L (ref 98–107)
COLOR UR AUTO: ABNORMAL
CREAT SERPL-MCNC: 0.77 MG/DL (ref 0.67–1.17)
DEPRECATED HCO3 PLAS-SCNC: 28 MMOL/L (ref 22–29)
ERYTHROCYTE [DISTWIDTH] IN BLOOD BY AUTOMATED COUNT: 15 % (ref 10–15)
GFR SERPL CREATININE-BSD FRML MDRD: >90 ML/MIN/1.73M2
GLUCOSE BLDC GLUCOMTR-MCNC: 124 MG/DL (ref 70–99)
GLUCOSE BLDC GLUCOMTR-MCNC: 162 MG/DL (ref 70–99)
GLUCOSE BLDC GLUCOMTR-MCNC: 171 MG/DL (ref 70–99)
GLUCOSE BLDC GLUCOMTR-MCNC: 262 MG/DL (ref 70–99)
GLUCOSE BLDC GLUCOMTR-MCNC: 367 MG/DL (ref 70–99)
GLUCOSE BLDC GLUCOMTR-MCNC: 438 MG/DL (ref 70–99)
GLUCOSE SERPL-MCNC: 156 MG/DL (ref 70–99)
GLUCOSE UR STRIP-MCNC: 150 MG/DL
HCT VFR BLD AUTO: 39.7 % (ref 40–53)
HGB BLD-MCNC: 12.5 G/DL (ref 13.3–17.7)
HGB UR QL STRIP: NEGATIVE
KETONES UR STRIP-MCNC: NEGATIVE MG/DL
LEUKOCYTE ESTERASE UR QL STRIP: NEGATIVE
MCH RBC QN AUTO: 27.2 PG (ref 26.5–33)
MCHC RBC AUTO-ENTMCNC: 31.5 G/DL (ref 31.5–36.5)
MCV RBC AUTO: 86 FL (ref 78–100)
NITRATE UR QL: NEGATIVE
PH UR STRIP: 8.5 [PH] (ref 5–7)
PLATELET # BLD AUTO: 318 10E3/UL (ref 150–450)
POTASSIUM SERPL-SCNC: 3.4 MMOL/L (ref 3.4–5.3)
PROT SERPL-MCNC: 6.5 G/DL (ref 6.4–8.3)
RBC # BLD AUTO: 4.6 10E6/UL (ref 4.4–5.9)
RBC URINE: 0 /HPF
SODIUM SERPL-SCNC: 144 MMOL/L (ref 136–145)
SP GR UR STRIP: 1.03 (ref 1–1.03)
UROBILINOGEN UR STRIP-MCNC: NORMAL MG/DL
WBC # BLD AUTO: 6.5 10E3/UL (ref 4–11)
WBC URINE: 2 /HPF

## 2023-05-07 PROCEDURE — 250N000013 HC RX MED GY IP 250 OP 250 PS 637

## 2023-05-07 PROCEDURE — 99232 SBSQ HOSP IP/OBS MODERATE 35: CPT | Mod: GC | Performed by: INTERNAL MEDICINE

## 2023-05-07 PROCEDURE — 85027 COMPLETE CBC AUTOMATED: CPT

## 2023-05-07 PROCEDURE — 74018 RADEX ABDOMEN 1 VIEW: CPT | Mod: 26 | Performed by: RADIOLOGY

## 2023-05-07 PROCEDURE — C9113 INJ PANTOPRAZOLE SODIUM, VIA: HCPCS

## 2023-05-07 PROCEDURE — 258N000003 HC RX IP 258 OP 636

## 2023-05-07 PROCEDURE — 120N000002 HC R&B MED SURG/OB UMMC

## 2023-05-07 PROCEDURE — 99222 1ST HOSP IP/OBS MODERATE 55: CPT | Mod: GC | Performed by: INTERNAL MEDICINE

## 2023-05-07 PROCEDURE — 250N000011 HC RX IP 250 OP 636

## 2023-05-07 PROCEDURE — 74018 RADEX ABDOMEN 1 VIEW: CPT

## 2023-05-07 PROCEDURE — 80053 COMPREHEN METABOLIC PANEL: CPT

## 2023-05-07 PROCEDURE — 81001 URINALYSIS AUTO W/SCOPE: CPT

## 2023-05-07 PROCEDURE — 36415 COLL VENOUS BLD VENIPUNCTURE: CPT

## 2023-05-07 RX ORDER — HYDROMORPHONE HYDROCHLORIDE 1 MG/ML
0.5 INJECTION, SOLUTION INTRAMUSCULAR; INTRAVENOUS; SUBCUTANEOUS EVERY 6 HOURS PRN
Status: DISCONTINUED | OUTPATIENT
Start: 2023-05-07 | End: 2023-05-08 | Stop reason: HOSPADM

## 2023-05-07 RX ORDER — NALOXONE HYDROCHLORIDE 0.4 MG/ML
0.4 INJECTION, SOLUTION INTRAMUSCULAR; INTRAVENOUS; SUBCUTANEOUS
Status: DISCONTINUED | OUTPATIENT
Start: 2023-05-07 | End: 2023-05-08 | Stop reason: HOSPADM

## 2023-05-07 RX ORDER — NALOXONE HYDROCHLORIDE 0.4 MG/ML
0.2 INJECTION, SOLUTION INTRAMUSCULAR; INTRAVENOUS; SUBCUTANEOUS
Status: DISCONTINUED | OUTPATIENT
Start: 2023-05-07 | End: 2023-05-08 | Stop reason: HOSPADM

## 2023-05-07 RX ADMIN — INSULIN ASPART 5 UNITS: 100 INJECTION, SOLUTION INTRAVENOUS; SUBCUTANEOUS at 17:41

## 2023-05-07 RX ADMIN — INSULIN ASPART 6 UNITS: 100 INJECTION, SOLUTION INTRAVENOUS; SUBCUTANEOUS at 22:09

## 2023-05-07 RX ADMIN — INSULIN ASPART 3 UNITS: 100 INJECTION, SOLUTION INTRAVENOUS; SUBCUTANEOUS at 14:29

## 2023-05-07 RX ADMIN — INSULIN ASPART 1 UNITS: 100 INJECTION, SOLUTION INTRAVENOUS; SUBCUTANEOUS at 06:23

## 2023-05-07 RX ADMIN — SODIUM CHLORIDE, POTASSIUM CHLORIDE, SODIUM LACTATE AND CALCIUM CHLORIDE: 600; 310; 30; 20 INJECTION, SOLUTION INTRAVENOUS at 08:54

## 2023-05-07 RX ADMIN — BICTEGRAVIR SODIUM, EMTRICITABINE, AND TENOFOVIR ALAFENAMIDE FUMARATE 1 TABLET: 50; 200; 25 TABLET ORAL at 10:14

## 2023-05-07 RX ADMIN — PANTOPRAZOLE SODIUM 40 MG: 40 INJECTION, POWDER, FOR SOLUTION INTRAVENOUS at 08:52

## 2023-05-07 RX ADMIN — SODIUM CHLORIDE, POTASSIUM CHLORIDE, SODIUM LACTATE AND CALCIUM CHLORIDE: 600; 310; 30; 20 INJECTION, SOLUTION INTRAVENOUS at 17:42

## 2023-05-07 RX ADMIN — FAMOTIDINE 40 MG: 20 TABLET ORAL at 08:52

## 2023-05-07 RX ADMIN — DIATRIZOATE MEGLUMINE AND DIATRIZOATE SODIUM 120 ML: 660; 100 SOLUTION ORAL; RECTAL at 04:48

## 2023-05-07 RX ADMIN — INSULIN ASPART 1 UNITS: 100 INJECTION, SOLUTION INTRAVENOUS; SUBCUTANEOUS at 10:14

## 2023-05-07 ASSESSMENT — ACTIVITIES OF DAILY LIVING (ADL)
ADLS_ACUITY_SCORE: 22
DEPENDENT_IADLS:: INDEPENDENT
ADLS_ACUITY_SCORE: 22

## 2023-05-07 NOTE — PROVIDER NOTIFICATION
"Maroon 2 paged: Pt is yelling at me saying \"if I don't get food in 20 minutes I am leaving, I've done it before\"     1326: Pt left unit and stated \"I'm going for a walk and will be back\", IVF infusing LR at 100mL/hr through PIV, NG tube clamped  "

## 2023-05-07 NOTE — PLAN OF CARE
Goal Outcome Evaluation:      Plan of Care Reviewed With: patient    Overall Patient Progress: no change    Outcome Evaluation: Uncooperative w/ cares, oriented x4, garbled speech, stable on RA, pulled NG tube out, not cooperative w/ clear liquid diet that was recommended from a medical stand point, RN educated on admission diagnosis and reasoning behind interventions, L PIV infusing LR @ 100mL/hr, BG Q4hr and continues to be elevated, insulin given per MAR, watery/loose BM x2 today, bowel sounds active, passing gas, gastrograffin challenge complete, UA sent, voiding and not saving, call light in reach, frequent visualization

## 2023-05-07 NOTE — PROGRESS NOTES
Surgery Progress Note    Andrew Collier  [id number]    No acute overnight events.   AFVSN  Pain controlled  Passing gas     Temp:  [97.4  F (36.3  C)-98.9  F (37.2  C)] 97.4  F (36.3  C)  Pulse:  [89-98] 98  Resp:  [16] 16  BP: (119-133)/(73-93) 129/76  SpO2:  [94 %-100 %] 99 %    Intake/Output Summary (Last 24 hours) at 5/7/2023 0740  Last data filed at 5/7/2023 0600  Gross per 24 hour   Intake 2368.33 ml   Output 500 ml   Net 1868.33 ml       NAD, resting comfortably  regular rate and rhythm  non-labored breathing  soft, non tender, mildly distended  warm and well perfused   alert, oriented    58yo man with who presents with recurrent SBO. Hemodynamically stable and non-peritonitic on exam. Will try medical management for now with NG decompression and bowel rest, although he has required diagnostic laparoscopy in the past for lysis of adhesions (most recently 2018).      - Admit to medicine  - NPO, NGT, bowel rest  - Serial abdominal exams by surgery team  - Strict I/O  - Okay for DVT ppx  - No abx indicated  - gastrografin challenge today 4am (ordered), 12pm AXR  - Remainder of cares per primary team    Seen with chief resident discussed w staff    Jorge Walker MD   Surgery PGY-1

## 2023-05-07 NOTE — PLAN OF CARE
Goal Outcome Evaluation:      Plan of Care Reviewed With: patient    Overall Patient Progress: improving     Outcome Evaluation: Pt arrived from [facility] ~ 2200.  Admit for SBO.  A&Ox4.  VSS on RA.  C/o abd pain, relief after PRN IV dilaudid.  L PIV infusing LR @ 100/hr.  NG to suction overnight until gastrograffin was given @ 0448, xray to be done @ 12.  NG had 350 ml bile OP now clamped.  Poor UOP, .  BM in ED & 1x on unit.  BS hyperactive.  NPO ex for ice chips.   & 171. Up SBA.  Refused skin assessment. Continue to monitor & follow POC.

## 2023-05-07 NOTE — CONSULTS
Care Management Initial Consult    General Information  Assessment completed with: VM-chart review, Patient,    Type of CM/SW Visit: Initial Assessment    Primary Care Provider verified and updated as needed:     Readmission within the last 30 days: previous discharge plan unsuccessful      Reason for Consult: utilization management concerns, discharge planning  Advance Care Planning: Advance Care Planning Reviewed: no concerns identified          Communication Assessment  Patient's communication style: spoken language (English or Bilingual)    Hearing Difficulty or Deaf: no   Wear Glasses or Blind: yes    Cognitive  Cognitive/Neuro/Behavioral: .WDL except  Level of Consciousness: lethargic, alert  Arousal Level: arouses to voice  Orientation: oriented x 4  Mood/Behavior: uncooperative     Speech: garbled    Living Environment:   People in home: alone, other (see comments) (congregate setting)     Current living Arrangements: shelter, homeless (Reports he is homeless and also stays at [facility])      Able to return to prior arrangements: yes       Family/Social Support:  Care provided by: self  Provides care for: no one  Marital Status: Single  Facility resident(s)/Staff          Description of Support System:      Support Assessment: Limited social contact and support    Current Resources:   Patient receiving home care services: No     Community Resources: [employer] Programs, County Worker, HIV Counseling, Financial/Insurance, Other (see comment)  Equipment currently used at home: none  Supplies currently used at home: None    Employment/Financial:  Employment Status: unemployed        Financial Concerns: No concerns identified           Does the patient's insurance plan have a 3 day qualifying hospital stay waiver?  No    Lifestyle & Psychosocial Needs:  Social Determinants of Health     Tobacco Use: High Risk (3/29/2023)    Patient History      Smoking Tobacco Use: Some Days      Smokeless Tobacco Use: Former     "  Passive Exposure: Not on file   Alcohol Use: Unknown (8/27/2020)    AUDIT-C      Frequency of Alcohol Consumption: Not on file      Average Number of Drinks: Patient refused      Frequency of Binge Drinking: Not on file   Financial Resource Strain: Not on file   Food Insecurity: Not on file   Transportation Needs: Not on file   Physical Activity: Not on file   Stress: Not on file   Social Connections: Not on file   Intimate Partner Violence: Not on file   Depression: Not at risk (8/27/2020)    PHQ-2      PHQ-2 Score: 0   Housing Stability: Not on file       Functional Status:  Prior to admission patient needed assistance:   Dependent ADLs:: Independent  Dependent IADLs:: Independent          Values/Beliefs:  Spiritual, Cultural Beliefs, Bahai Practices, Values that affect care:  (Not discussed at this time)               Additional Information:  Andrew Collier is a 57 year old w/ PMH recurrent SBO of unclear etiology, hx of perforated appendicitis s/p appendectomy, HIV infection c/b hx HSV+ anal biopsies and hx CNS toxoplasmosis, poorly controlled T2DM, hx housing insecurity, and substance abuse who presents with acute onset LLQ abdominal pain, nausea, and vomiting.    Per chart review patient was discharged from the hospital on 04/29/23. SW met with the patient at bedside to complete CMA. Patient reported he is homeless \"and has been homeless for awhile\". Patient reportedly stays at [facility]. SW offered additional resources. Patient declined additional resources at this time. Patient reported he is independent with his IADLs and ADLs. Patient reported he walks everywhere. Patient declined having questions or concerns at this time.     SW will continue to follow for discharge needs and resources.     Donavan MELTON  5/7/2023       Social Work and Care Management Department       SEARCHABLE in [facility] - search SOCIAL WORK       [place] ([phone]) Saturday and Sunday     Units: 4A, " 4C, & 4E Pager: [phone]     Units: 5A & 5B Pager: [phone]     Units: 6A & 6B   Pager: [phone]     Units: 6C & 6D Pager: [phone]     Units: 7A &7B  Pager: [phone]     Units: 7C, 7D, & 5C Pager: [phone]     Unit: [facility] Pager: [phone]      [facility] [place], [place] ([phone]) Saturday and Sunday      Units: 5 Ortho, 5 Med/Surg & WB ED  Pager:[phone]     Units: 6 Med/Surg, 8A, & 10A ICU  Pager: [phone]        After hours (1630 - 0000) Saturday & Sunday; (5543-6255) Mon-Fri; (7122-0826) [facility] Recognized Holidays     Units: ALL  Pager: [phone]

## 2023-05-07 NOTE — PROGRESS NOTES
"RN has attempted to obtain UA since 0800, pt has been sleeping all shift and has not urinated in >4 hours, urinal at bedside, refusing RN to perform bladder scan per order, pt continues to raise voice at RN and tell them to \"come back and do it later\"   "

## 2023-05-07 NOTE — PROVIDER NOTIFICATION
"Cristina 2 paged: RN entered pt room, pt lying in bed with no NG tube in place, pt states \"it got caught on the toilet and fell out\"  "

## 2023-05-07 NOTE — CONSULTS
NEW INPATIENT DIABETES MANAGEMENT CONSULT  Andrew Collier  Age: 57 year old  MRN # [id number]   YOB: 1965    Chief Complaint: l sided adbd pain  Reason for Consult: T2DM  Consulting Provider: Kayla Simmons    Assessment:     T2DM - poorly Controlled  HIV - on biktarvy  NPO for small bowel obstruction        Plan:    -Lantus 12 units -> increase to 14 units qhs   -Novolog MIRD q4h   -BG monitoring TID AC, HS   -hypoglycemia protocol   -Diabetes education needs will be assessed closer to discharge   -on discharge, will recommend outpatient follow up with [facility] service. Pt is unable to afford medications for diabetes. Will need to discuss with pharmacy and  for the best regimen during discharge.    Discussed plan of care with patient, nursing, and primary team  Thank you for this consult; Inpatient Diabetes will continue to follow.     History of Present Illness:   Andrew Collier is a 57 year old male with PMHx of T2DM, HIV, GERD, DAVID admitted for SBO.    Pt was seen by diabetes inpatient team on 11/18/2022 for hyperglycemia. He was last seen in endocrine in clinic on 10/19/2021, and no show for his further endo appointment.      Ordered DM relevant regimen  Glargine 12 units at bedtime  Aspart q4h medium dose sliding scale  Relevant Labs    150 lbs 6.4 oz  No results for input(s): A1C in the last 168 hours.  Recent Labs   Lab 05/07/23  0553   CR 0.77     Recent Labs   Lab 05/07/23  0615 05/07/23  0553 05/07/23  0158 05/06/23  2246 05/06/23  1955 05/06/23  1731   * 156* 124* 149* 221* 314*         Other Active Medical Problems:   Diabetes Mellitus Type: 2  Duration:    9 years  Diabetic Complications:   retinopathy - N/A  Peripheral neuropathy - denied  Nephropathy -   Lab Results   Component Value Date/Time    CR 0.77 05/07/2023 05:53 AM    CR 0.74 05/06/2023 07:07 AM    CR 0.81 06/24/2021 07:31 PM    CR 0.79 06/15/2021 11:53 AM     No results  "found for: MICROCR  Prior to Admission Diabetes Regimen:    Metformin 3000 mg daily?, glimepiride 4 mg bid,  sitagliptin 100 mg daily - prescribed but not taking oral meds due to unable to afford it  BG monitor: glucometer  Frequency of checks: don't check routinely  Diet: eats three meals per day provided by the homeless shelter, denies food insecurity at this point  Usual BG control PTA:    Lab Results   Component Value Date    A1C 11.5 04/18/2023    A1C 12.8 12/07/2022    A1C 12.8 11/17/2022    A1C 9.7 11/11/2020    A1C 8.2 09/25/2020    A1C 8.2 08/27/2020     History of DKA: -  Able to Detect Hypoglycemia:  yes  Usual Diabetes Care Provider:  [facility] and PCP  Factors Impacting Glucose Control: lack of stable housing is main factor  Current Diet: Orders Placed This Encounter      NPO for Medical/Clinical Reasons Except for: Ice Chips, Meds      10 point ROS completed with pertinent positives and negatives noted in the HPI  Past medical, family and social histories are reviewed and updated.    Social History  Social History     Tobacco Use    Smoking status: Some Days     Packs/day: 0.25     Years: 40.00     Pack years: 10.00     Types: Cigarettes    Smokeless tobacco: Former   Vaping Use    Vaping status: Not on file   Substance Use Topics    Alcohol use: No     Alcohol/week: 0.0 standard drinks of alcohol     Comment: Last etoh in 2007         Family History     Family History   Problem Relation Age of Onset    Diabetes Brother     Diabetes Father     Alzheimer Disease Father     Unknown/Adopted Mother     Diabetes Paternal Grandfather     Cancer No family hx of         no skin cancer    Skin Cancer No family hx of         no famiy hx of skin cancer    Glaucoma No family hx of     Macular Degeneration No family hx of        Physical Exam   /76 (BP Location: Right arm)   Pulse 98   Temp 97.4  F (36.3  C) (Axillary)   Resp 16   Ht 1.6 m (5' 3\")   Wt 68.2 kg (150 lb 6.4 oz)   " SpO2 99%   BMI 26.64 kg/m    General: tired  HEENT: normocephalic, atraumatic. Oral mucous membranes moist.   Lungs: unlabored respiration, no cough  ABD: rounded, nondistended  Skin: warm and dry, no obvious lesions  MSK:  moves all extremities  Lymp:  no LE edema   Mental status:  alert, oriented to self, place, time  Psych:  calm and appropriate interaction     Most Recent Laboratory Tests:  Recent Labs   Lab 05/07/23  0553   HGB 12.5*             To contact Endocrine Diabetes service:   From 8AM-4PM: page inpatient diabetes provider that is following the patient  For questions or updates from 4PM-8AM: page the diabetes job code for on call fellow: [phone]    Evin Verde MD  Endocrine Fellow  Pager # [phone]    Attending tie-in note  I saw the patient with endocrine fellow Dr. Verde and directly examined patient and discussed. Agree above note and plan.       Peace Dueñas MD  Staff Physician  Endocrinology and Metabolism  [facility]  License: [id number]  Pager: [phone]

## 2023-05-07 NOTE — PROGRESS NOTES
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     Today Updates  - gastrografin challenge - oral contrast reaches colon and rectum, c/w low-grade/partial SBO  - abdominal pain and distention better  - NG tube self-removed by patient  - advanced diet to clear liquids, pt self-advancing diet and seeking solid foods   - IV dialudid decreased from Q3H PRN to Q6H PRN     #Partial/low-grade small bowel obstruction of unclear etiology, improved  #leukocytosis, mild  Patient presented with acute onset of L sided abdominal pain, nausea, and vomiting x 6 hours, with a hx of 1 day of significant diarrhea on day prior.  Workup revealed largely unremarkable labs, with imaging revealing high-grade mechanical small bowel obstruction with transition point in the anterior right abdomen, possibly 2/2 adhesions. No free air, abscess, or free fluid. Surgery consulted and recommended medical management and evaluation with gastrografin challenge. Abodominal pain improved with conservative measures w/ NG to suction and bowel rest. Gastrograffin challenge showed low-grade/partial SBO w/ decreased small bowel distention. Diet advanced.   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 Surgery did not find extensive adhesions on dx laparoscopy in 2018. MR enterography in 2019 similarly did not show active enteritis. No evidence to suggest lymphoma, infection, Whipple's or extremely poor motility as culprit.\"        #DM2, poorly controlled, a1c 11.5%  #Hyperglycemia  Glucose 479 on admit. Has hx of difficulty with regimen after discharge for multiple reasons (no access to refrigerated insulin storage, side effects from oral meds, does not have much flexibility in available food choices etc). Would benefit from reconnection with endocrine, ideally who can follow as an outpatient. Endocrine consulted.   - endocrine consult, appreciate recs  - increase Lantus 14 units QHS  -mSSI Q4H   -hypoglycemia protocol              -Hold PTA oral diabetic medication for now       #Pseudohyonatremia 2/2 hyperglycemia, resolved  Na 134 on admit, Na 140-143 corrected for significant hyperglycemia (glucose 479)  - corrected glucose w/ insulin as above     #Hypercalcemia, resolved   Ca 11.5 on admit. Does not have hx of hypercalcemia w/ recent calcium level 8-10, most recent 8.6 ~2.5 weeks prior to admit. May have component of hemoconcentration in s/o significant emesis. Ca normalized to 9.5 with fluid resuscitation.  - no workup indicated    #HIV  - PTA biktarvy  - check CD4 count - pending  - due for ID follow-up 4/2023 (last saw ID 1/2023)     #GERD  Endorsing active symptoms  - cont PTA famotidine  - IV pantoprazole daily until no longer NPO, then transition to oral PPI      Discharge needs:  Due for ID follow-up for HIV 4/2023 (last saw ID 1/2023)  -Diabetes education needs will be " "assessed closer to discharge  - per endocrine note: \"on discharge, will recommend outpatients follow up with [facility]ology service. Pt is unable to afford medications for diabetes. Will need to discuss with pharmacy and  for the best regimen during discharge.\"       Diet: clear liquid diet  Fluids: LR 100ml/hr  Prophylaxis:              DVT: not indicated, PADUA score 0-1              GI: not indicated  Code: Full  Disposition: likely home     Patient to staffed with Dr. Whitt.     Iris Garza MD  Internal Medicine, PGY2  [phone]    Clinically Significant Risk Factors           # Hypercalcemia: Highest Ca = 11.5 mg/dL in last 2 days, will monitor as appropriate   # Anion Gap Metabolic Acidosis: Highest Anion Gap = 19 mmol/L in last 2 days, will monitor and treat as appropriate           # DMII: A1C = 11.5 % (Ref range: <5.7 %) within past 6 months, PRESENT ON ADMISSION  # Overweight: Estimated body mass index is 26.64 kg/m  as calculated from the following:    Height as of this encounter: 1.6 m (5' 3\").    Weight as of this encounter: 68.2 kg (150 lb 6.4 oz)., PRESENT ON ADMISSION         Disposition Plan      Expected Discharge Date: 05/08/2023      Destination: shelter  Discharge Comments: admitted for small bowel obstruction. lives in a shelter, pt says he can return back to it at discharge. on bowel rest now. Likely to dc to community on 5/8 or 5/9          Iris Garza MD  Medicine Service, [address]  Securely message with [employer] (more info)  Text page via [employer] Paging/Directory   See signed in provider for up to date coverage information  ______________________________________________________________________    Interval History   NAEO    This AM, pt says he feels much better. Abdomen is no longer distended. He had 2 BMs. Is interested in clear liquid diet following gastrograffin challenge    Physical Exam   Vital " Signs: Temp: 97.6  F (36.4  C) Temp src: Axillary BP: 135/89 Pulse: 94   Resp: 17 SpO2: 100 % O2 Device: None (Room air)    Weight: 150 lbs 6.4 oz     GENERAL APPEARANCE: comfortable appearing, nontoxic   HEENT: NCAT, MMM. NG tube in place   CARDIAC: RRR. Normal S1, S2. No M/R/G.   LUNGS: CTA w/o rales, rhonchi, wheezing or diminished breath sounds.  EXTREMITIES: No significant lower extremity edema  ABDOMEN: Soft, nondistended nontender. No guarding or rebound. No masses.  NEUROLOGICAL: AxO x3. CN II-XII grossly intact. Moving all extremities spontaneously  SKIN: No rash on exposed skin  PSYCH: Normal Affect and mood for situation. Answers questions appropriately      Data     I have personally reviewed the following data over the past 24 hrs:    6.5  \   12.5 (L)   / 318     144 106 20.8 /  367 (H)   3.4 28 0.77 \       ALT: 10 AST: 14 AP: 80 TBILI: 0.8   ALB: 3.8 TOT PROTEIN: 6.5 LIPASE: N/A       Imaging results reviewed over the past 24 hrs:   Recent Results (from the past 24 hour(s))   XR Gastrografin Challenge    Narrative    Exam: XR GASTROGRAFIN CHALLENGE, 5/7/2023 2:45 PM    Indication: Small Bowel Obstruction    Comparison: Abdomen/pelvis CT 5/6/2023    Findings:   Supine frontal views of the abdomen 8 hours after Gastrografin  administration. Gastric tube tip and sidehole project over the  stomach. Enteric contrast is seen within the colon and rectum. Some  mildly gas-distended small bowel loops remain. No pneumatosis.      Impression    Impression:   Oral contrast reaches the colon and rectum, most consistent with  low-grade/partial small bowel obstruction. Decreased small bowel  distention.    I have personally reviewed the examination and initial interpretation  and I agree with the findings.    JOSE VITAL DO         SYSTEM ID:  [id number]

## 2023-05-08 VITALS
WEIGHT: 150.4 LBS | TEMPERATURE: 97.8 F | SYSTOLIC BLOOD PRESSURE: 118 MMHG | OXYGEN SATURATION: 96 % | RESPIRATION RATE: 17 BRPM | DIASTOLIC BLOOD PRESSURE: 72 MMHG | HEIGHT: 63 IN | HEART RATE: 93 BPM | BODY MASS INDEX: 26.65 KG/M2

## 2023-05-08 LAB
ANION GAP SERPL CALCULATED.3IONS-SCNC: 10 MMOL/L (ref 7–15)
BUN SERPL-MCNC: 6.9 MG/DL (ref 8–23)
CALCIUM SERPL-MCNC: 8.7 MG/DL (ref 8.6–10)
CD3 CELLS # BLD: 1121 CELLS/UL (ref 603–2990)
CD3 CELLS NFR BLD: 69 % (ref 49–84)
CD3+CD4+ CELLS # BLD: 330 CELLS/UL (ref 441–2156)
CD3+CD4+ CELLS NFR BLD: 20 % (ref 28–63)
CD3+CD4+ CELLS/CD3+CD8+ CLL BLD: 0.45 % (ref 1.4–2.6)
CD3+CD8+ CELLS # BLD: 741 CELLS/UL (ref 125–1312)
CD3+CD8+ CELLS NFR BLD: 46 % (ref 10–40)
CHLORIDE SERPL-SCNC: 103 MMOL/L (ref 98–107)
CREAT SERPL-MCNC: 0.68 MG/DL (ref 0.67–1.17)
DEPRECATED HCO3 PLAS-SCNC: 25 MMOL/L (ref 22–29)
ERYTHROCYTE [DISTWIDTH] IN BLOOD BY AUTOMATED COUNT: 14.1 % (ref 10–15)
GFR SERPL CREATININE-BSD FRML MDRD: >90 ML/MIN/1.73M2
GLUCOSE BLDC GLUCOMTR-MCNC: 126 MG/DL (ref 70–99)
GLUCOSE BLDC GLUCOMTR-MCNC: 283 MG/DL (ref 70–99)
GLUCOSE BLDC GLUCOMTR-MCNC: 351 MG/DL (ref 70–99)
GLUCOSE BLDC GLUCOMTR-MCNC: 390 MG/DL (ref 70–99)
GLUCOSE SERPL-MCNC: 159 MG/DL (ref 70–99)
HCT VFR BLD AUTO: 38.5 % (ref 40–53)
HGB BLD-MCNC: 11.2 G/DL (ref 13.3–17.7)
MAGNESIUM SERPL-MCNC: 1.6 MG/DL (ref 1.7–2.3)
MCH RBC QN AUTO: 27.5 PG (ref 26.5–33)
MCHC RBC AUTO-ENTMCNC: 29.1 G/DL (ref 31.5–36.5)
MCV RBC AUTO: 95 FL (ref 78–100)
PHOSPHATE SERPL-MCNC: 2.9 MG/DL (ref 2.5–4.5)
PLATELET # BLD AUTO: 215 10E3/UL (ref 150–450)
POTASSIUM SERPL-SCNC: 3.2 MMOL/L (ref 3.4–5.3)
RBC # BLD AUTO: 4.07 10E6/UL (ref 4.4–5.9)
SODIUM SERPL-SCNC: 138 MMOL/L (ref 136–145)
T CELL COMMENT: ABNORMAL
WBC # BLD AUTO: 5.6 10E3/UL (ref 4–11)

## 2023-05-08 PROCEDURE — 99231 SBSQ HOSP IP/OBS SF/LOW 25: CPT | Performed by: SURGERY

## 2023-05-08 PROCEDURE — 250N000013 HC RX MED GY IP 250 OP 250 PS 637

## 2023-05-08 PROCEDURE — 99238 HOSP IP/OBS DSCHRG MGMT 30/<: CPT | Mod: GC | Performed by: INTERNAL MEDICINE

## 2023-05-08 PROCEDURE — 250N000011 HC RX IP 250 OP 636

## 2023-05-08 PROCEDURE — C9113 INJ PANTOPRAZOLE SODIUM, VIA: HCPCS

## 2023-05-08 PROCEDURE — 85027 COMPLETE CBC AUTOMATED: CPT

## 2023-05-08 PROCEDURE — 83735 ASSAY OF MAGNESIUM: CPT

## 2023-05-08 PROCEDURE — 36415 COLL VENOUS BLD VENIPUNCTURE: CPT

## 2023-05-08 PROCEDURE — 84100 ASSAY OF PHOSPHORUS: CPT

## 2023-05-08 PROCEDURE — 80048 BASIC METABOLIC PNL TOTAL CA: CPT

## 2023-05-08 PROCEDURE — 258N000003 HC RX IP 258 OP 636

## 2023-05-08 RX ORDER — IBUPROFEN 600 MG/1
600 TABLET, FILM COATED ORAL ONCE
Status: COMPLETED | OUTPATIENT
Start: 2023-05-08 | End: 2023-05-08

## 2023-05-08 RX ORDER — INSULIN GLARGINE 100 [IU]/ML
12 INJECTION, SOLUTION SUBCUTANEOUS AT BEDTIME
Qty: 10 ML | Refills: 4 | OUTPATIENT
Start: 2023-05-08 | End: 2024-08-08

## 2023-05-08 RX ORDER — MAGNESIUM SULFATE 1 G/100ML
1 INJECTION INTRAVENOUS ONCE
Status: DISCONTINUED | OUTPATIENT
Start: 2023-05-08 | End: 2023-05-08 | Stop reason: HOSPADM

## 2023-05-08 RX ORDER — POTASSIUM CHLORIDE 750 MG/1
40 TABLET, EXTENDED RELEASE ORAL ONCE
Status: COMPLETED | OUTPATIENT
Start: 2023-05-08 | End: 2023-05-08

## 2023-05-08 RX ADMIN — BICTEGRAVIR SODIUM, EMTRICITABINE, AND TENOFOVIR ALAFENAMIDE FUMARATE 1 TABLET: 50; 200; 25 TABLET ORAL at 10:56

## 2023-05-08 RX ADMIN — IBUPROFEN 600 MG: 600 TABLET, FILM COATED ORAL at 12:34

## 2023-05-08 RX ADMIN — SODIUM CHLORIDE, POTASSIUM CHLORIDE, SODIUM LACTATE AND CALCIUM CHLORIDE: 600; 310; 30; 20 INJECTION, SOLUTION INTRAVENOUS at 04:54

## 2023-05-08 RX ADMIN — PANTOPRAZOLE SODIUM 40 MG: 40 INJECTION, POWDER, FOR SOLUTION INTRAVENOUS at 10:56

## 2023-05-08 RX ADMIN — INSULIN ASPART 3 UNITS: 100 INJECTION, SOLUTION INTRAVENOUS; SUBCUTANEOUS at 01:24

## 2023-05-08 RX ADMIN — INSULIN ASPART 5 UNITS: 100 INJECTION, SOLUTION INTRAVENOUS; SUBCUTANEOUS at 15:16

## 2023-05-08 RX ADMIN — FAMOTIDINE 40 MG: 20 TABLET ORAL at 10:56

## 2023-05-08 RX ADMIN — POTASSIUM CHLORIDE 40 MEQ: 750 TABLET, EXTENDED RELEASE ORAL at 12:34

## 2023-05-08 ASSESSMENT — ACTIVITIES OF DAILY LIVING (ADL)
ADLS_ACUITY_SCORE: 22

## 2023-05-08 NOTE — PROGRESS NOTES
"IP Diabetes Management  Daily Note         HPI: Andrew Collier is a 57 year old male with PMHx of T2DM, HIV, GERD, DAVID admitted for SBO.    Assessment:   T2DM - poorly Controlled  HIV - on biktarvy  NPO for small bowel obstruction    Inpatient Plan:       -Lantus 14 units                 -Novolog MIRD q4h      -Added 1:18 CHO with meals and snacks                 -BG monitoring TID AC, HS                 -hypoglycemia protocol                 -Diabetes education needs will be assessed closer to discharge      -on discharge, will recommend outpatient follow up with [facility] service. Pt is unable to afford medications for diabetes. Will need to discuss with pharmacy and  for the best regimen during discharge.    Recommendations for Discharge:   Medications and supplies are to be ordered by primary service on discharge.   *please use the DIAB non-branded discharge supply order set ([id number])*     -blood glucose monitoring three times daily before meals and at bedtime    -Lantus 12 units once daily at 10 PM-- would maybe consider Metformin XR if patient declines insulin   -Januvia 100 mg once daily     -follow up with [facility] in 1 week after discharge (appointment request sent to clinic coordinator on 5/8)   -upon discharge, patient will need the following supplies: Lantus Solostar pens, \"BD\" (32G x 4mm) insulin pen needles, glucometer, lancets, and test strips (if brand of meter not known, can be ordered \"no brand specified\" and note to pharmacy: \"can substitute per insurance coverage\"), sharps container, alcohol swabs, Januvia prescription.       Plan discussed with patient, bedside RN- via note, and primary team- over phone.      Interval History and Assessment: interval glucose trend reviewed: BG elevated at last evening. CHO coverage added today.     Patient may discharge today- discussed with team patient has limited capacity to do insulin outpatient- ideally " "patient would have at lantus dosing daily- if patient declines would consider starting metformin XR (notes diarrhea with metformin IR). See pharmacy liaison notes for copay information     Awaiting to hear back from primary service on  findings and capacity for insulin.--- update: Patient able to do lantus once daily- added tentative outpatient regimen.       Currently, denies nausea, ,vomiting or diarrhea. Patient notes on rounding \"just let me sleep\"    Labs:5/8  Bicarb:25  Creatinine: 0.68  eGFR: >90  Anion Gap: 10    Current nutritional intake and type: Orders Placed This Encounter      Clear Liquid Diet      Planned Procedures/surgeries: none  Steroid planning: none  D5W-containing solutions/medications: none    PTA Diabetes Regimen:   Metformin 3000 mg daily?, glimepiride 4 mg bid,  sitagliptin 100 mg daily - prescribed but not taking oral meds due to unable to afford it  BG monitor: glucometer- lost per patient            Diabetes History:   Type of Diabetes: Type 2 Diabetes Mellitus  Lab Results   Component Value Date    A1C 11.5 04/18/2023    A1C 12.8 12/07/2022    A1C 12.8 11/17/2022    A1C 11.2 02/18/2022    A1C 11.5 01/22/2022    A1C 9.7 11/11/2020    A1C 8.2 09/25/2020    A1C 8.2 08/27/2020    A1C 11.6 02/11/2020    A1C 12.3 01/07/2020              Review of Systems:     The Review of Systems is negative other than noted in the Interval History.           Medications:     Current Facility-Administered Medications   Medication     acetaminophen (TYLENOL) tablet 975 mg     bictegravir-emtricitabine-tenofovir (BIKTARVY) -25 MG per tablet 1 tablet     glucose gel 15-30 g    Or     dextrose 50 % injection 25-50 mL    Or     glucagon injection 1 mg     famotidine (PEPCID) tablet 40 mg     HYDROmorphone (PF) (DILAUDID) injection 0.5 mg     insulin aspart (NovoLOG) injection (RAPID ACTING)     insulin aspart (NovoLOG) injection (RAPID ACTING)     insulin aspart (NovoLOG) injection (RAPID " "ACTING)     insulin glargine (LANTUS PEN) injection 14 Units     lactated ringers infusion     lidocaine (LMX4) cream     lidocaine 1 % 0.1-1 mL     melatonin tablet 5 mg     naloxone (NARCAN) injection 0.2 mg    Or     naloxone (NARCAN) injection 0.4 mg    Or     naloxone (NARCAN) injection 0.2 mg    Or     naloxone (NARCAN) injection 0.4 mg     ondansetron (ZOFRAN ODT) ODT tab 4 mg    Or     ondansetron (ZOFRAN) injection 4 mg     pantoprazole (PROTONIX) IV push injection 40 mg     sodium chloride (PF) 0.9% PF flush 3 mL     sodium chloride (PF) 0.9% PF flush 3 mL            Physical Exam:    /72 (BP Location: Right arm)   Pulse 83   Temp 97  F (36.1  C) (Axillary)   Resp 16   Ht 1.6 m (5' 3\")   Wt 68.2 kg (150 lb 6.4 oz)   SpO2 98%   BMI 26.64 kg/m    General: lying in bed   HEENT: normocephalic, atraumatic. Oral mucous membranes moist.   Lungs: unlabored respiration, no cough  ABD: rounded, nondistended appearing  Skin: warm and dry  MSK:  moves all extremities  Mental status:  alert, oriented to self  Psych:  flat affect, withdrawn.             Data:     Recent Labs   Lab 05/08/23  0056 05/07/23  2201 05/07/23  1740 05/07/23  1428 05/07/23  1013 05/07/23  0615   * 438* 367* 262* 162* 171*     Lab Results   Component Value Date    WBC 6.5 05/07/2023    WBC 12.8 (H) 05/06/2023    WBC 4.7 04/19/2023    HGB 12.5 (L) 05/07/2023    HGB 15.2 05/06/2023    HGB 12.2 (L) 04/19/2023    HCT 39.7 (L) 05/07/2023    HCT 45.2 05/06/2023    HCT 38.8 (L) 04/19/2023    MCV 86 05/07/2023    MCV 83 05/06/2023    MCV 86 04/19/2023     05/07/2023     05/06/2023     04/19/2023     Lab Results   Component Value Date     05/07/2023     (L) 05/06/2023     (L) 04/19/2023    POTASSIUM 3.4 05/07/2023    POTASSIUM 4.5 05/06/2023    POTASSIUM 3.8 04/19/2023    CHLORIDE 106 05/07/2023    CHLORIDE 94 (L) 05/06/2023    CHLORIDE 104 04/19/2023    CO2 28 05/07/2023    CO2 21 (L) 05/06/2023 "    CO2 21 (L) 04/19/2023     (H) 05/08/2023     (H) 05/07/2023     (H) 05/07/2023     Lab Results   Component Value Date    BUN 20.8 05/07/2023    BUN 22.2 05/06/2023    BUN 13.5 04/19/2023     Lab Results   Component Value Date    TSH 0.87 09/25/2020    TSH 2.66 02/11/2020    TSH 3.57 02/15/2018     Lab Results   Component Value Date    AST 14 05/07/2023    AST 28 05/06/2023    AST 27 04/18/2023    ALT 10 05/07/2023    ALT 21 05/06/2023    ALT 8 (L) 04/18/2023    ALKPHOS 80 05/07/2023    ALKPHOS 165 (H) 05/06/2023    ALKPHOS 111 04/18/2023       I spent a total of 35 minutes face to face or coordinating care of Andrew Collier.             Over 50% of my time on the unit was spent counseling the patient and/or coordinating care regarding acute hyperglycemia management.  See note for details.      Contacting the Inpatient Diabetes Team   From 7AM-5PM: page inpatient diabetes provider that is following the patient, or utilize the job code paging system. From 5PM-7AM: page the job code for endocrine fellow on call.       Please use the following job code to reach the Inpatient Diabetes team. Note that you must use an in house phone and that job codes cannot receive text pages.     Dial [phone] (or star-star-star [phone] on the new [employer] telephones), then [phone] to reach the endocrine-diabetes provider on call.    VIKASH Quezada, CNP  Inpatient Diabetes Service   Pager: [phone]

## 2023-05-08 NOTE — PROGRESS NOTES
"[facility]    Progress Note - Medicine Service, MARZEN TEAM 2       Date of Admission:  5/6/2023    Assessment & Plan      Andrew Collier is a 57 year old w/ PMH recurrent SBO of unclear etiology, hx of perforated appendicitis s/p appendectomy, HIV infection c/b hx HSV+ anal biopsies and hx CNS toxoplasmosis, poorly controlled T2DM, hx housing insecurity, and substance abuse who presents with acute onset LLQ abdominal pain, nausea, and vomiting, found to have recurrence of small bowel obstruction. SBO improved, advancing diet as tolerable.     Today Updates  - abdominal pain and distention better  - advanced diet to soft low fiber foods  - Plan to discharge pt on Januvia and Lantus. Appreciate Endocrinology and SW assistance.   - Plan to discharge tomorrow if continues to tolerate diet well.      #Partial/low-grade small bowel obstruction of unclear etiology, improved  #leukocytosis, mild  Patient presented with acute onset of L sided abdominal pain, nausea, and vomiting x 6 hours, with a hx of 1 day of significant diarrhea on day prior.  Workup revealed largely unremarkable labs, with imaging revealing high-grade mechanical small bowel obstruction with transition point in the anterior right abdomen, possibly 2/2 adhesions. No free air, abscess, or free fluid. Surgery consulted and recommended medical management and evaluation with gastrografin challenge. Abodominal pain improved with conservative measures w/ NG to suction and bowel rest. Gastrograffin challenge showed low-grade/partial SBO w/ decreased small bowel distention. Diet advanced.   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 Surgery did not find extensive adhesions on dx laparoscopy in 2018. MR enterography in 2019 similarly did not show active enteritis. No evidence to suggest lymphoma, infection, Whipple's or extremely poor motility as culprit.\"        #DM2, poorly controlled, a1c 11.5%  #Hyperglycemia  Glucose 479 on admit. Has hx of difficulty with regimen after discharge for multiple reasons (no access to refrigerated insulin storage, side effects from oral meds, does not have much flexibility in available food choices etc). Endocrinology consulted, recommends Januvia daily and Lantus 12 units HS at discharge. SW contacted shelter, they do have a refrigerator available for insulin storage. Foundation funds obtained to cover co-pays at discharge.   - endocrine consult, appreciate recs  - Lantus 14 units QHS  -mSSI Q4H   -hypoglycemia protocol              -Hold PTA oral diabetic medication for now       #Pseudohyonatremia 2/2 hyperglycemia, resolved  Na 134 on admit, Na 140-143 corrected for significant hyperglycemia (glucose 479)  - corrected glucose w/ insulin as above     #Hypercalcemia, resolved   Ca 11.5 on admit. Does not have hx of hypercalcemia w/ recent calcium level 8-10, most recent 8.6 ~2.5 weeks prior to admit. May have component of hemoconcentration in s/o significant emesis. Ca normalized to 9.5 with fluid resuscitation.  - no workup indicated    #HIV  - PTA biktarvy  - check CD4 count - pending  - due for ID follow-up 4/2023 (last saw ID 1/2023)     #GERD  Endorsing active symptoms  - cont PTA famotidine  - IV pantoprazole daily until no longer NPO, then transition to oral PPI      Discharge needs:  Due for ID follow-up for HIV 4/2023 (last saw ID 1/2023)  -Diabetes education needs will be assessed closer to discharge  - per endocrine note: \"on discharge, " "will [facility]. Pt is unable to afford medications for diabetes. Will need to discuss with pharmacy and  for the best regimen during discharge.\"       Diet: clear liquid diet  Fluids: PO  Prophylaxis:              DVT: not indicated, PADUA score 0-1              GI: not indicated  Code: Full  Disposition: likely home     Patient to staffed with Dr. Whitt.     Audie Guillaume DO, PGY1  Internal Medicine    Clinically Significant Risk Factors        # Hypokalemia: Lowest K = 3.2 mmol/L in last 2 days, will replace as needed     # Hypomagnesemia: Lowest Mg = 1.6 mg/dL in last 2 days, will replace as needed            # DMII: A1C = 11.5 % (Ref range: <5.7 %) within past 6 months, PRESENT ON ADMISSION  # Overweight: Estimated body mass index is 26.64 kg/m  as calculated from the following:    Height as of this encounter: 1.6 m (5' 3\").    Weight as of this encounter: 68.2 kg (150 lb 6.4 oz)., PRESENT ON ADMISSION         Disposition Plan      Expected Discharge Date: 05/08/2023      Destination: shelter  Discharge Comments: admitted for small bowel obstruction. lives in a shelter, pt says he can return back to it at discharge. Advanced diet today, likely to be discharged this afternoon if doing well.          Audie Guillaume MD  Medicine Service, [facility] TEAM [address] [place] [address]  Securely message with [employer] (more info)  Text page via [facility] Paging/Directory   See signed in provider for up to date coverage information  ______________________________________________________________________    Interval History   NAEO    Pt resting comfortably, in NAD. Agrees with advancing diet, denies any n/v, abdominal pain. No BM today, but continues to pass gas. Would like to stay here overnight to make sure tolerating diet okay. Denies any fever, chills, chest pain, SOB. Endorses mild sore throat.     Physical Exam   Vital Signs: " Temp: 97.8  F (36.6  C) Temp src: Oral BP: 118/72 Pulse: 93   Resp: 17 SpO2: 96 % O2 Device: None (Room air)    Weight: 150 lbs 6.4 oz     GENERAL APPEARANCE: comfortable appearing, nontoxic   HEENT: NCAT, MMM.   CARDIAC: RRR. Normal S1, S2. No M/R/G.   LUNGS: CTA w/o rales, rhonchi, wheezing or diminished breath sounds.  EXTREMITIES: No significant lower extremity edema  ABDOMEN: Soft, nondistended nontender. No guarding or rebound. No masses.  NEUROLOGICAL: AxO x3.  Moving all extremities spontaneously, no focal deficits noted  SKIN: No rash on exposed skin        Data     I have personally reviewed the following data over the past 24 hrs:    5.6  \   11.2 (L)   / 215     138 103 6.9 (L) /  390 (H)   3.2 (L) 25 0.68 \       Imaging results reviewed over the past 24 hrs:   No results found for this or any previous visit (from the past 24 hour(s)).

## 2023-05-08 NOTE — PROGRESS NOTES
SPIRITUAL HEALTH SERVICES  SPIRITUAL ASSESSMENT Progress Note  [facility] 5A       REFERRAL SOURCE: Self initiated  visit, Confucianism specific.     DEMOGRAPHIC: Pt Andrew Collier identifies as Confucianism and is of  descent.        ILLNESS CIRCUMSTANCE: I introduced myself to Andrew as the Lead Confucianism  and oriented him to [facility].      Andrew declined a visit at this time due to wishing to rest and sleep.     SPIRITUAL INTERVENTIONS: Andrew accepted an Islamic prayer booklet for Confucianism patients.    PLAN: I will follow up with Andrew for the duration of his stay. [facility] is available to Andrew per request.     Clari Aldrich  Lead Confucianism   Pager [phone]    [facility] remains available 24/7 for emergent requests/referrals, either by having the switchboard page the on-call  or by entering an ASAP/STAT consult in Epic (this will also page the on-call ).

## 2023-05-08 NOTE — PROGRESS NOTES
Surgery Progress Note    Andrew Collier  [id number]    No acute overnight events.   AFVSN  Pain controlled  Passing gas   BMx2    Temp:  [97  F (36.1  C)-97.6  F (36.4  C)] 97  F (36.1  C)  Pulse:  [83-98] 83  Resp:  [16-17] 16  BP: (112-135)/(71-89) 112/72  SpO2:  [98 %-100 %] 98 %    Intake/Output Summary (Last 24 hours) at 5/7/2023 0740  Last data filed at 5/7/2023 0600  Gross per 24 hour   Intake 2368.33 ml   Output 500 ml   Net 1868.33 ml       NAD, resting comfortably  regular rate and rhythm  non-labored breathing  soft, non tender, mildly distended  warm and well perfused   alert, oriented    56yo man with who presents with recurrent SBO. Hemodynamically stable and non-peritonitic on exam. Will try medical management for now with NG decompression and bowel rest, although he has required diagnostic laparoscopy in the past for lysis of adhesions (most recently 2018).      - surgery will sign off at this time, please re engage if new questions arise  - GGE shows contrast reaching rectum, most cw partial obstruction  - now passing BMs  - CLD may advance as tolerated  - Strict I/O  - Okay for DVT ppx  - No abx indicated    Seen with chief resident discussed w staff    Jorge Walker MD   Surgery PGY-1

## 2023-05-08 NOTE — PROGRESS NOTES
Care Management Follow Up    Length of Stay (days): 2    Expected Discharge Date: 05/08/2023     Concerns to be Addressed: Discharge planning    Patient plan of care discussed at interdisciplinary rounds: Yes    Anticipated Discharge Disposition: Shelter     Anticipated Discharge Services: None    Anticipated Discharge DME: None    Patient/family educated on Medicare website which has current facility and service quality ratings:  No    Education Provided on the Discharge Plan:  No    Patient/Family in Agreement with the Plan: Yes      Referrals Placed by CM/SW:  No    Private pay costs discussed: Medication co-pays    Additional Information: DAYNA spoke with Dr. Guillaume who reported pt may be medically ready to discharge to the [facility]; however, pt will need lantus, Januvia, and metformin, and per pharmacy liaison there are co-pays for these. Dr. Guillaume also wanted to know if pt can keep his insulin in the refrigerator at the shelter.    DAYNA sent email to [email or web address] to ask about the refrigerator. DAYNA received email from Karl Tanner ([email or web address]) reporting they do have a refrigerator for medications.    DAYNA completed foundation funds request form to ask for the co-pays to be paid for pt at the discharge pharmacy. Received confirmation from Sera Peng (accommodations specialist) that this will be done.    DAYNA provided the above update to Dr. Guillaume.    CRISTAL Gonzalez   Float   Pager: [phone]  Unit DAYNA Phone: [phone]

## 2023-05-08 NOTE — PLAN OF CARE
Goal Outcome Evaluation:      Plan of Care Reviewed With: patient    Overall Patient Progress: no change       Outcome Evaluation: IV fluids discontinued.  Pt leaves unit frequently; RN educated pt on the importance of blood glucose checks and carb coverage.  Diet advanced, pt tolerating.  Possible plan to discharge 5/9.

## 2023-05-08 NOTE — DISCHARGE INSTRUCTIONS
IP Diabetes Management Team Discharge Instructions    Glucose Control Regimen:   -Lantus insulin inject 12 units once daily at 10 PM  -Januvia 100 mg once daily in AM     Blood Glucose Checks: three times daily before meals, and at bedtime.    Endocrinology Outpatient follow up: An appointment request was sent to the [facility] Endocr[facility]ology [facility] coordinator to schedule your outpatient diabetes appointment 1-2 weeks from discharge. Please call the clinic at [phone] if you do not have an appointment scheduled on discharge, or if you have non-urgent questions regarding your blood sugars or insulin.     If you have urgent questions or concerns regarding your blood sugars or insulin, you may contact [phone] (the main hospital ). Ask to speak with the endocrinologist on call.    Your target A1c value is less than 7%. Your most recent A1c is 11.5%.     Thank you for letting the Diabetes Management Team be involved in your care!

## 2023-05-08 NOTE — PLAN OF CARE
Goal Outcome Evaluation:      Plan of Care Reviewed With: patient    Overall Patient Progress: no change    Outcome Evaluation: A&Ox4 and VSS on RA. Denies pain. L PIV running LR at 100 mL/hr. BS q4hr and insulin given per MAR. Pt slept throughout the night. No acute changes. Continue with POC.

## 2023-05-08 NOTE — CONSULTS
Discharge Pharmacy Test Claim    Accu-chek guide, januvia, and lantus pens are [facility]. Expected copays listed below.    Test Claim Copay   accu-chek guide 0.00   januvia 3.00   lantus solostar pens 3.00     Lucy Martin  [facility] Pharmacy Liaison  Ph: [phone] Pager: [phone]   Securely message with the Vocera Web Console (learn more here)

## 2023-05-08 NOTE — CONSULTS
Discharge Pharmacy Test Claim    Patient has a copay of $1 for metformin ER through his [facility] medical assistance plan.    Test Claim Copay   metformin ER 1.00       Lucy Martin  [facility] Pharmacy Liaison  Ph: [phone] Pager: [phone]   Securely message with the Vocera Web Console (learn more here)

## 2023-05-09 NOTE — PROVIDER NOTIFICATION
Pt has not been seen on unit for 3+ hours. Pt will now be discharged for hospital policy. Writer did attempt to call pt at number listed x2 with no answer and unable to leave voicemail. ANS has been alerted and team has been paged. No belongings left in room.

## 2023-05-11 NOTE — DISCHARGE SUMMARY
[facility]  Discharge Summary - Medicine & Pediatrics       Date of Admission:  5/6/2023  Date of Discharge:  5/8/2023  9:24 PM  Discharging Provider: Dr. Prince Whitt  Discharge Service: Medicine Service, JORGE TEAM 2    Discharge Diagnoses     Partial/low-grade small bowel obstruction  Leukocytosis  DM2, poorly controlled  Hypercalcemia  HIV  GERD     Follow-ups Needed After Discharge   Follow-up Appointments     Follow Up ([facility])      Patient to be seen in 1-2 weeks of discharge with [facility]   for glycemic management.     Appointments on [place] and/or [facility] (with [facility] or [facility]   provider or service). Call [phone] if you haven't heard regarding   these appointments within 7 days of discharge.             Unresulted Labs Ordered in the Past 30 Days of this Admission     No orders found from 4/6/2023 to 5/7/2023.      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 His condition improved with medical conservative management as below. On 5/9/23, pt eloped from the hospital. He was formally discharged after he was found to be out of his room for >3 hours. He did not inform staff prior to leaving the hospital, and thus he left without his arranged medications and follow-up plan.      #Partial/low-grade small bowel obstruction of unclear etiology, improved  #leukocytosis, mild  Patient presented with acute onset of L sided abdominal pain, nausea, and  "vomiting x 6 hours, with a hx of 1 day of significant diarrhea on day prior.  Workup revealed largely unremarkable labs, with imaging revealing high-grade mechanical small bowel obstruction with transition point in the anterior right abdomen, possibly 2/2 adhesions. No free air, abscess, or free fluid. Surgery consulted and recommended medical management and evaluation with gastrografin challenge. Abodominal pain improved with conservative measures w/ NG to suction and bowel rest. Gastrograffin challenge showed low-grade/partial SBO w/ decreased small bowel distention. Diet advanced. Pt tolerated liquid diet prior to hospital elopement.     - surgery consulted, appreciate recs  - WBC 12.8, mild leukocytosis --> normalized 6.5 without specific intervention  - no abx indicated  - Per past GI notes \"Most plausibly his chronic obstructive episodes are 2/2 to a past HIV related enteritis (histoplasmosis or otherwise) that has resolved and left him with multiple segment of SB stricturing. Surgery did not find extensive adhesions on dx laparoscopy in 2018. MR enterography in 2019 similarly did not show active enteritis. No evidence to suggest lymphoma, infection, Whipple's or extremely poor motility as culprit.\"        #DM2, poorly controlled, a1c 11.5%  #Hyperglycemia  Glucose 479 on admit. Has hx of difficulty with regimen after discharge for multiple reasons (no access to refrigerated insulin storage, side effects from oral meds, does not have much flexibility in available food choices etc). Endocrinology consulted, recommends Januvia daily and Lantus 12 units HS at discharge. SW contacted shelter, they do have a refrigerator available for insulin storage. Foundation funds obtained to cover co-pays at discharge.  Pt was unable to take advantage of obtaining insulin at discharge due to elopement.     Endocrine discharge plan:    -blood glucose monitoring three times daily before meals and at bedtime   -Lantus 12 units " "once daily at 10 PM-- would maybe consider Metformin XR if patient declines insulin   -Januvia 100 mg once daily  -follow up with [facility] in 1 week after discharge (appointment request sent to clinic coordinator on 5/8)        #Pseudohyonatremia 2/2 hyperglycemia, resolved  Na 134 on admit, Na 140-143 corrected for significant hyperglycemia (glucose 479)  - corrected glucose w/ insulin as above     #Hypercalcemia, resolved   Ca 11.5 on admit. Does not have hx of hypercalcemia w/ recent calcium level 8-10, most recent 8.6 ~2.5 weeks prior to admit. May have component of hemoconcentration in s/o significant emesis. Ca normalized to 9.5 with fluid resuscitation.  - no workup indicated     #HIV  - PTA biktarvy  - Absolute CD4 count - 330  - due for ID follow-up 4/2023 (last saw ID 1/2023, did not have insurance so had to defer management until insurance obtained again)     #GERD  Endorsing active symptoms  - cont PTA famotidine  - IV pantoprazole daily until no longer NPO, then transition to oral PPI      Discharge needs:  Due for ID follow-up for HIV 4/2023 (last saw ID 1/2023)  -Diabetes education needs will be assessed closer to discharge  - per endocrine note: \"on discharge, will recommend outpatients follow up with [facility] Endocr[facility]ology service. Pt is unable to afford medications for diabetes. Will need to discuss with pharmacy and  for the best regimen during discharge.\"     Consultations This Hospital Stay   ENDOCRINE DIABETES ADULT IP CONSULT  CARE MANAGEMENT / SOCIAL WORK IP CONSULT  PHARMACY LIAISON FOR MEDICATION COVERAGE CONSULT  PHARMACY LIAISON FOR MEDICATION COVERAGE CONSULT    Code Status   Prior       The patient was discussed with MD Lory Silva[address] [place] [address]  Phone: [phone]  ______________________________________________________________________    Physical Exam   Vital Signs: " unable to get vitals prior to discharge due to elopement  Weight: 150 lbs 6.4 oz    From progress note on day of discharge:   GENERAL APPEARANCE: comfortable appearing, nontoxic   HEENT: NCAT, MMM.   CARDIAC: RRR. Normal S1, S2. No M/R/G.   LUNGS: CTA w/o rales, rhonchi, wheezing or diminished breath sounds.  EXTREMITIES: No significant lower extremity edema  ABDOMEN: Soft, nondistended nontender. No guarding or rebound. No masses.  NEUROLOGICAL: AxO x3.  Moving all extremities spontaneously, no focal deficits noted  SKIN: No rash on exposed skin        Primary Care Physician   Chevy Reynoso    Discharge Orders      Follow Up ([facility])    Patient to be seen in 1-2 weeks of discharge with [facility] for glycemic management.     Appointments on [place] and/or [facility] (with [facility] or [facility] provider or service). Call [phone] if you haven't heard regarding these appointments within 7 days of discharge.       Significant Results and Procedures   Most Recent 3 CBC's:Recent Labs   Lab Test 05/08/23  0635 05/07/23  0553 05/06/23  0707   WBC 5.6 6.5 12.8*   HGB 11.2* 12.5* 15.2   MCV 95 86 83    318 397   ,   Results for orders placed or performed during the hospital encounter of 05/06/23   CT Abdomen Pelvis w/o Contrast    Narrative    EXAM: CT ABDOMEN PELVIS W/O CONTRAST  LOCATION: [facility]  DATE/TIME: 5/6/2023 8:21 AM CDT    INDICATION: LLQ pain  COMPARISON: CT abdomen pelvis 04/16/2023.  TECHNIQUE: CT scan of the abdomen and pelvis was performed without IV contrast. Multiplanar reformats were obtained. Dose reduction techniques were used.  CONTRAST: None.    FINDINGS:   LOWER CHEST: Normal.    HEPATOBILIARY: Tiny gallstones. Normal noncontrast liver.    PANCREAS: Normal.    SPLEEN: Normal.    ADRENAL GLANDS: Normal.    KIDNEYS/BLADDER: Normal.    BOWEL: Marked distention of the stomach. Mild dilatation of proximal small bowel extending  to a focal transition point in the anterior right abdomen (series 2, images 39-46), consistent with a small bowel obstruction. No free air. No abscess. No free   fluid.    LYMPH NODES: Normal.    VASCULATURE: Mild atherosclerotic calcifications.    PELVIC ORGANS: Penile prosthesis.    MUSCULOSKELETAL: Normal.      Impression    IMPRESSION:   High-grade mechanical small bowel obstruction with transition point in the anterior right abdomen, which may be on the basis of adhesions.   XR Chest Port 1 View    Narrative    XR CHEST PORT 1 VIEW  5/6/2023 12:56 PM      HISTORY: NG tube placement verification    COMPARISON: Chest x-ray 3/13/2023, 12/17/2022.    FINDINGS:   Portable AP 10 degree upright chest x-ray. Gastric tube tip and  sidehole terminating over the stomach.    Trachea is midline. Cardiac silhouette is within normal limits.  Pulmonary vasculature is distinct. No focal airspace opacity, pleural  effusion, pneumothorax.    Bony structures appear intact.      Impression    IMPRESSION:   1.  Gastric tube tip and sidehole terminating over the stomach.  2.  Low lung volume. Lungs are clear.    I have personally reviewed the examination and initial interpretation  and I agree with the findings.    ADINA JACKMAN MD         SYSTEM ID:  [id number]   XR Gastrografin Challenge    Narrative    Exam: XR GASTROGRAFIN CHALLENGE, 5/7/2023 2:45 PM    Indication: Small Bowel Obstruction    Comparison: Abdomen/pelvis CT 5/6/2023    Findings:   Supine frontal views of the abdomen 8 hours after Gastrografin  administration. Gastric tube tip and sidehole project over the  stomach. Enteric contrast is seen within the colon and rectum. Some  mildly gas-distended small bowel loops remain. No pneumatosis.      Impression    Impression:   Oral contrast reaches the colon and rectum, most consistent with  low-grade/partial small bowel obstruction. Decreased small bowel  distention.    I have personally reviewed the examination and initial  interpretation  and I agree with the findings.    JOSE VITAL,          SYSTEM ID:  [id number]     *Note: Due to a large number of results and/or encounters for the requested time period, some results have not been displayed. A complete set of results can be found in Results Review.       Discharge Medications   Discharge Medication List as of 5/8/2023  9:24 PM      CONTINUE these medications which have NOT CHANGED    Details   bictegravir-emtricitabine-tenofovir (BIKTARVY) -25 MG per tablet Take 1 tablet by mouth daily, Disp-30 tablet, R-2, E-Prescribe      acetaminophen (TYLENOL) 500 MG tablet Take 1-2 tablets (500-1,000 mg) by mouth every 6 hours as needed for mild pain, Disp-90 tablet, R-0, E-Prescribe      blood glucose (NO BRAND SPECIFIED) lancets standard Use to test blood sugar 1 time daily or as directed.Disp-100 lancet, [id number]      blood glucose (NO BRAND SPECIFIED) test strip Use to test blood sugar 1 time daily or as directed., Disp-100 strip, R-4, E-Prescribe      blood glucose monitoring (NO BRAND SPECIFIED) meter device kit Use to test blood sugar 1 time daily or as directed.Disp-1 kit, [id number]      buPROPion (WELLBUTRIN XL) 150 MG 24 hr tablet Take 1 tablet (150 mg) by mouth daily, Disp-60 tablet, R-0, E-Prescribe      famotidine (PEPCID) 40 MG tablet Take 1 tablet (40 mg) by mouth daily, Disp-60 tablet, R-1, E-Prescribe      glimepiride (AMARYL) 4 MG tablet Take 1 tablet (4 mg) by mouth 2 times daily (before meals), Disp-90 tablet, R-1, E-Prescribe      ibuprofen (ADVIL/MOTRIN) 600 MG tablet Take 1 tablet (600 mg) by mouth every 6 hours as needed for moderate pain (4-6), Disp-90 tablet, R-0, E-Prescribe      levofloxacin (LEVAQUIN) 500 MG tablet Take 1 tablet (500 mg) by mouth daily, Disp-3 tablet, R-0, E-Prescribe      metFORMIN (FORTAMET) 1000 MG 24 hr tablet Take 2 tablets (2,000 mg) by mouth daily (with dinner), Disp-120 tablet, R-0, E-Prescribe      sitagliptin (JANUVIA)  "100 MG tablet Take 1 tablet (100 mg) by mouth daily, Disp-60 tablet, R-0, E-Prescribe      sulfamethoxazole-trimethoprim (BACTRIM DS) 800-160 MG tablet Take 1 tablet by mouth 2 times daily, Disp-7 tablet, R-0, E-Prescribe           Allergies   Allergies   Allergen Reactions     Fentanyl Blisters     Per pt, after taking this medication had blisters develope     Tylenol [Acetaminophen] Itching     Dulaglutide Rash     Insulin Lispro Rash     Patient reported     Penicillin V Other (See Comments) and Rash     Diffuse maculopapular rash + feels \"high\", per pt.      "

## 2023-06-03 NOTE — PROGRESS NOTES
Colon and Rectal Surgery Clinic Note    RE: Andrew Lockwood  : 1967  CHRISSY: 2018    Andrew Lockwood is a very pleasant 50 year old male HIV positive male with DM II who presents today for recurrent small bowel obstructions.    HPI: Andrew has a history of chronic abdominal pain with recurrent small bowel obstructions. He has had two exploratory laparotomies in the past with no source of obstruction noted on either occasion. CT on 10/5/18 showed focal small bowel enteritis with associated wall thickening and narrowing small bowel dilation proximal to the inflammation. He has had chronic constipation since childhood. He has had negative workup for Crohn's disease including negative EGD and colonoscopy. He had appendectomy earlier this year with pathology consistent for acute appendicitis. Andrew complains of heartburn and takes prilosec, zantac, and tums for this.  Of note, Andrew had anal cytology with Carrie Jain NP in  showing LSIL   Family history of sister with Crohn's disease.  Social: homeless. Current half pack per day smoker. Marijuana use.    Physical examination:  Examination was chaperoned by Carrie Jain NP      Vitals: /78 (BP Location: Left arm, Patient Position: Sitting, Cuff Size: Adult Regular)  Pulse 112  Temp 98.2  F (36.8  C)  Wt 170 lb 12.8 oz  SpO2 98%  BMI 29.32 kg/m2  BMI= Body mass index is 29.32 kg/(m^2).    Alert, oriented, in no acute distress, sitting comfortably.    Laboratory data:    Recent Labs   Lab Test  18   1055   18   0901   WBC  7.0   < >  8.4   HGB  13.5   < >  10.9*   PLT  249   < >  254   CR  0.86   < >  0.66   ALBUMIN  3.3*   < >   --    BILITOTAL  0.6   < >   --    ALKPHOS  114   < >   --    ALT  29   < >   --    AST  24   < >   --    INR   --    --   1.06    < > = values in this interval not displayed.       Assessment/Plan: Unexplained recurrent small bowel obstructions raising a question of possible stricture  but this is thought to be not reliably reachable by balloon enteroscopy. Had discussed possible laparotomy assisted enteroscopy versus further workup with MRE or capsule endoscopy. He was discussed at the multidisciplinary GI luminal care conference and decided the best course would be further workup with capsule study or MRE before considering surgical intervention. Will have him continue to follow with Dr. Molina for this.   Will get him set up for high resolution anoscopy with Carrie Jain NP for his anal cytology showing LSIL.  Patient's questions were answered to his stated satisfaction and he is in agreement with this plan.    Total face to face time was 10 minutes, >50% counseling.    For details of past medical history, surgical history, family history, medications, allergies, and review of systems, please see details below.    Medical history:  Past Medical History:   Diagnosis Date     AIDS (H)      Allergic rhinitis due to other allergen     DNS     Chronic abdominal pain      CNS toxoplasmosis (H)      Diabetes type 2, controlled (H)      GERD (gastroesophageal reflux disease)      HIV (human immunodeficiency virus infection) (H)      Periungual wart      Sleep apnea     doesn't use CPAP       Surgical history:  Past Surgical History:   Procedure Laterality Date     C NONSPECIFIC PROCEDURE      right forearm fracture     COLONOSCOPY Left 1/22/2016    Procedure: COMBINED COLONOSCOPY, SINGLE OR MULTIPLE BIOPSY/POLYPECTOMY BY BIOPSY;  Surgeon: Clark Saini MD;  Location: [facility]     HC EXPLORE UNDESC TESTIS,INGUIN/SCROTAL       LAPAROSCOPIC APPENDECTOMY N/A 1/31/2018    Procedure: LAPAROSCOPIC APPENDECTOMY;  LAPAROSCOPIC APPENDECTOMY;  Surgeon: Dawn Holt MD;  Location: [facility] OR     LAPAROSCOPY DIAGNOSTIC (GENERAL) N/A 7/26/2016    Procedure: LAPAROSCOPY DIAGNOSTIC (GENERAL);  Surgeon: Susannah Arriaga MD;  Location: [facility] OR     LAPAROSCOPY DIAGNOSTIC (GENERAL) N/A  4/16/2018    Procedure: LAPAROSCOPY DIAGNOSTIC (GENERAL);  Diagnostic laparoscopy and lysis of adhesions;  Surgeon: Prince Dowling MD;  Location: [facility] OR     OPTICAL TRACKING SYSTEM CRANIOTOMY, EXCISE TUMOR, COMBINED Left 4/10/2015    Procedure: COMBINED OPTICAL TRACKING SYSTEM CRANIOTOMY, EXCISE TUMOR;  Surgeon: Mirlande Colmenares MD;  Location: [facility] OR     REPAIR GAMEKEEPER'S THUMB Right 12/2/2016    Procedure: REPAIR LIGAMENT ULNAR COLLATERAL THUMB (GAMEKEEPER'S);  Surgeon: Evin Zamorano MD;  Location: [facility]       Family history:  Family History   Problem Relation Age of Onset     Diabetes Brother      Diabetes Father      Alzheimer Disease Father      Unknown/Adopted Mother      Diabetes Paternal Grandfather      Cancer No family hx of      no skin cancer     Skin Cancer No family hx of      no famiy hx of skin cancer     Glaucoma No family hx of      Macular Degeneration No family hx of        Medications:  Current Outpatient Prescriptions   Medication Sig Dispense Refill     BASAGLAR 100 UNIT/ML injection Inject 20 Units Subcutaneous daily 15 mL 0     Bictegravir-Emtricitab-Tenofov -25 MG TABS Take 1 tablet by mouth daily 30 tablet 11     calcium carbonate (TUMS) 500 MG chewable tablet Take 2 tablets (1,000 mg) by mouth 3 times daily as needed for heartburn 150 tablet 3     empagliflozin (JARDIANCE) 10 MG TABS tablet Take 1 tablet by mouth daily 90 tablet 3     gabapentin (NEURONTIN) 100 MG capsule Take 2 capsules (200 mg) by mouth 3 times daily 90 capsule 0     hyoscyamine 0.125 MG TBDP Take 2 tablets (0.25 mg) by mouth 4 times daily as needed for cramping 60 tablet 0     insulin glargine (LANTUS SOLOSTAR) 100 UNIT/ML pen Inject 20 Units Subcutaneous every morning 15 mL 0     omeprazole (PRILOSEC) 20 MG CR capsule Take 1 capsule (20 mg) by mouth 2 times daily 180 capsule 3     ranitidine (ZANTAC) 150 MG tablet Take 1 tablet (150 mg) by mouth 2 times daily 60 tablet 3     simethicone (MYLICON)  80 MG chewable tablet Take 1 tablet (80 mg) by mouth 4 times daily 30 tablet 0     albuterol (PROAIR HFA/PROVENTIL HFA/VENTOLIN HFA) 108 (90 Base) MCG/ACT inhaler Inhale 2 puffs into the lungs every 6 hours as needed for shortness of breath / dyspnea or wheezing (Patient not taking: Reported on 11/13/2018) 1 Inhaler 1     blood glucose (NO BRAND SPECIFIED) lancets standard Use to test blood sugar four times daily or as directed. (Patient not taking: Reported on 11/13/2018) 100 Box 5     blood glucose monitoring (NO BRAND SPECIFIED) meter device kit Use to test blood sugar 4 times daily or as directed. (Patient not taking: Reported on 11/13/2018) 1 kit 0     blood glucose monitoring (NO BRAND SPECIFIED) test strip 1 strip by In Vitro route 4 times daily (before meals and nightly) Use to test blood sugars 4 times daily or as directed (Patient not taking: Reported on 11/13/2018) 100 strip 11     CHANTIX STARTING MONTH OLIVERIO 0.5 MG X 11 & 1 MG X 42 tablet Dispense starter pack as written (Patient not taking: Reported on 11/13/2018) 1 tablet 0     glycerin, adult, 2 g SUPP Suppository Place 1 suppository rectally daily as needed for constipation 5 suppository 0     ibuprofen (ADVIL/MOTRIN) 200 MG tablet Take 2 tablets (400 mg) by mouth every 8 hours as needed for pain (Patient not taking: Reported on 11/13/2018) 60 tablet 0     insulin pen needle (B-D U/F) 31G X 8 MM Use 1 pen needles daily or as directed. (Patient not taking: Reported on 11/13/2018) 100 each 0     magnesium oxide (MAG-OX) 400 MG tablet Take 1 tablet (400 mg) by mouth daily (Patient not taking: Reported on 11/13/2018) 30 tablet 0     montelukast (SINGULAIR) 10 MG tablet Take 1 tablet (10 mg) by mouth At Bedtime (Patient not taking: Reported on 11/13/2018) 30 tablet 1     ondansetron (ZOFRAN-ODT) 4 MG ODT tab Take 1 tablet (4 mg) by mouth 2 times daily (Patient not taking: Reported on 11/13/2018) 6 tablet 0     pseudoePHEDrine (SUDAFED) 30 MG tablet Take 2  tablets (60 mg) by mouth every 6 hours as needed for congestion (Patient not taking: Reported on 11/13/2018) 20 tablet 0       Allergies:  The patientis allergic to metformin; milk [lac bovis]; tylenol [acetaminophen]; and dulaglutide.    Social history:  Social History   Substance Use Topics     Smoking status: Current Every Day Smoker     Packs/day: 0.50     Types: Cigarettes     Last attempt to quit: 10/28/2016     Smokeless tobacco: Former User     Alcohol use No      Comment: Last etoh in 2007     Marital status: single.    Review of Systems:  Nursing Notes:   Savannah Hagen CMA  11/13/2018  9:51 AM  Signed  Chief Complaint   Patient presents with     Consult     Surgery for stricture.     Referral     Per Dr. Molina.       Vitals:    11/13/18 0939   BP: 127/78   BP Location: Left arm   Patient Position: Sitting   Cuff Size: Adult Regular   Pulse: 112   Temp: 98.2  F (36.8  C)   SpO2: 98%   Weight: 170 lb 12.8 oz       Body mass index is 29.32 kg/(m^2).      Savannah Hagen, EMT                             Thanh Lundberg MD   Professor and Chief  Division of Colon and Rectal Surgery  [facility]      Referring Provider:  Suly Molina MD  [address]  [place] [address]     Primary Care Provider:  Caitie Lamb   No

## 2023-06-08 NOTE — DISCHARGE INSTRUCTIONS
Thank you for your patience today.  Please follow-up with your regular doctor in the next 2-3 days for further evaluation and follow-up care.  Please call to schedule an appointment.  Please continue your own medications.  Please return to the ER if you develop high fever, severe pain, persistent vomiting, or any worsening of your current symptoms.  It was a pleasure taking care of you today.  We hope you feel better soon.     General Sunscreen Counseling: I recommended a broad spectrum sunscreen with a SPF of 30 or higher.  I explained that SPF 30 sunscreens block approximately 97 percent of the sun's harmful rays.  Sunscreens should be applied at least 15 minutes prior to expected sun exposure and then every 2 hours after that as long as sun exposure continues. If swimming or exercising sunscreen should be reapplied every 45 minutes to an hour after getting wet or sweating.  One ounce, or the equivalent of a shot glass full of sunscreen, is adequate to protect the skin not covered by a bathing suit. I also recommended a lip balm with a sunscreen as well. Sun protective clothing can be used in lieu of sunscreen but must be worn the entire time you are exposed to the sun's rays. Products Recommended: Bare Natural sunscreen Detail Level: Zone

## 2023-06-20 ENCOUNTER — APPOINTMENT (OUTPATIENT)
Dept: GENERAL RADIOLOGY | Facility: CLINIC | Age: 58
End: 2023-06-20
Attending: EMERGENCY MEDICINE
Payer: COMMERCIAL

## 2023-06-20 ENCOUNTER — HOSPITAL ENCOUNTER (EMERGENCY)
Facility: CLINIC | Age: 58
Discharge: HOME | End: 2023-06-20
Attending: EMERGENCY MEDICINE | Admitting: EMERGENCY MEDICINE
Payer: COMMERCIAL

## 2023-06-20 ENCOUNTER — HOSPITAL ENCOUNTER (INPATIENT)
Facility: CLINIC | Age: 58
LOS: 1 days | Discharge: LEFT BEFORE DISCHARGE | End: 2023-06-21
Attending: EMERGENCY MEDICINE | Admitting: STUDENT IN AN ORGANIZED HEALTH CARE EDUCATION/TRAINING PROGRAM
Payer: COMMERCIAL

## 2023-06-20 VITALS
RESPIRATION RATE: 18 BRPM | HEART RATE: 90 BPM | TEMPERATURE: 97 F | SYSTOLIC BLOOD PRESSURE: 127 MMHG | OXYGEN SATURATION: 98 % | DIASTOLIC BLOOD PRESSURE: 72 MMHG

## 2023-06-20 DIAGNOSIS — R73.9 HYPERGLYCEMIA: ICD-10-CM

## 2023-06-20 DIAGNOSIS — S70.01XA CONTUSION OF RIGHT HIP, INITIAL ENCOUNTER: ICD-10-CM

## 2023-06-20 DIAGNOSIS — L03.114 CELLULITIS OF LEFT WRIST: ICD-10-CM

## 2023-06-20 DIAGNOSIS — S40.011A CONTUSION OF RIGHT SHOULDER, INITIAL ENCOUNTER: ICD-10-CM

## 2023-06-20 DIAGNOSIS — E11.65 TYPE 2 DIABETES MELLITUS WITH HYPERGLYCEMIA, UNSPECIFIED WHETHER LONG TERM INSULIN USE (H): ICD-10-CM

## 2023-06-20 DIAGNOSIS — S93.401A SPRAIN OF RIGHT ANKLE, UNSPECIFIED LIGAMENT, INITIAL ENCOUNTER: ICD-10-CM

## 2023-06-20 DIAGNOSIS — K56.609 SMALL BOWEL OBSTRUCTION (H): ICD-10-CM

## 2023-06-20 DIAGNOSIS — R10.84 ABDOMINAL PAIN, GENERALIZED: ICD-10-CM

## 2023-06-20 DIAGNOSIS — K56.609 SBO (SMALL BOWEL OBSTRUCTION) (H): ICD-10-CM

## 2023-06-20 PROCEDURE — 73610 X-RAY EXAM OF ANKLE: CPT | Mod: 26 | Performed by: RADIOLOGY

## 2023-06-20 PROCEDURE — 73502 X-RAY EXAM HIP UNI 2-3 VIEWS: CPT

## 2023-06-20 PROCEDURE — 73030 X-RAY EXAM OF SHOULDER: CPT | Mod: RT

## 2023-06-20 PROCEDURE — 99283 EMERGENCY DEPT VISIT LOW MDM: CPT | Performed by: EMERGENCY MEDICINE

## 2023-06-20 PROCEDURE — 99285 EMERGENCY DEPT VISIT HI MDM: CPT | Performed by: EMERGENCY MEDICINE

## 2023-06-20 PROCEDURE — 96374 THER/PROPH/DIAG INJ IV PUSH: CPT | Performed by: EMERGENCY MEDICINE

## 2023-06-20 PROCEDURE — 73030 X-RAY EXAM OF SHOULDER: CPT | Mod: 26 | Performed by: RADIOLOGY

## 2023-06-20 PROCEDURE — 99284 EMERGENCY DEPT VISIT MOD MDM: CPT | Performed by: EMERGENCY MEDICINE

## 2023-06-20 PROCEDURE — 99285 EMERGENCY DEPT VISIT HI MDM: CPT | Mod: 25 | Performed by: EMERGENCY MEDICINE

## 2023-06-20 PROCEDURE — 73610 X-RAY EXAM OF ANKLE: CPT | Mod: RT

## 2023-06-20 PROCEDURE — 73502 X-RAY EXAM HIP UNI 2-3 VIEWS: CPT | Mod: 26 | Performed by: RADIOLOGY

## 2023-06-20 RX ORDER — IBUPROFEN 600 MG/1
600 TABLET, FILM COATED ORAL EVERY 6 HOURS PRN
Qty: 20 TABLET | Refills: 0 | Status: SHIPPED | OUTPATIENT
Start: 2023-06-20 | End: 2023-06-25

## 2023-06-20 RX ORDER — ACETAMINOPHEN 500 MG
500-1000 TABLET ORAL EVERY 6 HOURS PRN
Qty: 40 TABLET | Refills: 0 | Status: SHIPPED | OUTPATIENT
Start: 2023-06-20 | End: 2023-06-25

## 2023-06-20 ASSESSMENT — VISUAL ACUITY: OU: 1

## 2023-06-20 ASSESSMENT — ACTIVITIES OF DAILY LIVING (ADL): ADLS_ACUITY_SCORE: 37

## 2023-06-20 NOTE — ED TRIAGE NOTES
Triage Assessment     Row Name 06/20/23 [id number]       Triage Assessment (Adult)    Airway WDL WDL       Respiratory WDL    Respiratory WDL WDL       Skin Circulation/Temperature WDL    Skin Circulation/Temperature WDL WDL       Cardiac WDL    Cardiac WDL WDL       Peripheral/Neurovascular WDL    Peripheral Neurovascular WDL WDL       Cognitive/Neuro/Behavioral WDL    Cognitive/Neuro/Behavioral WDL WDL

## 2023-06-20 NOTE — ED NOTES
Bed: [facility]-  Expected date:   Expected time:   Means of arrival:   Comments:  H417  83 F  Shoulder pain, arm pain, shortness of breath
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
## 2023-06-20 NOTE — DISCHARGE INSTRUCTIONS
I was happy to see that your x-rays demonstrate no sign of obvious broken bone or dislocation.  These are most consistent with sprain or injury to your soft tissue.  You may utilize acetaminophen and or ibuprofen as needed.  Please follow-up with your primary care physician if pain persist.
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
## 2023-06-21 ENCOUNTER — APPOINTMENT (OUTPATIENT)
Dept: CT IMAGING | Facility: CLINIC | Age: 58
End: 2023-06-21
Attending: EMERGENCY MEDICINE
Payer: COMMERCIAL

## 2023-06-21 ENCOUNTER — APPOINTMENT (OUTPATIENT)
Dept: GENERAL RADIOLOGY | Facility: CLINIC | Age: 58
End: 2023-06-21
Payer: COMMERCIAL

## 2023-06-21 VITALS
RESPIRATION RATE: 16 BRPM | HEART RATE: 86 BPM | HEIGHT: 62 IN | WEIGHT: 148 LBS | BODY MASS INDEX: 27.23 KG/M2 | TEMPERATURE: 98 F | OXYGEN SATURATION: 96 % | DIASTOLIC BLOOD PRESSURE: 95 MMHG | SYSTOLIC BLOOD PRESSURE: 139 MMHG

## 2023-06-21 PROBLEM — R10.84 ABDOMINAL PAIN, GENERALIZED: Status: ACTIVE | Noted: 2023-06-21

## 2023-06-21 PROBLEM — R73.9 HYPERGLYCEMIA: Status: ACTIVE | Noted: 2023-06-21

## 2023-06-21 LAB
ALBUMIN SERPL BCG-MCNC: 4 G/DL (ref 3.5–5.2)
ALP SERPL-CCNC: 111 U/L (ref 40–129)
ALT SERPL W P-5'-P-CCNC: 16 U/L (ref 0–70)
ANION GAP SERPL CALCULATED.3IONS-SCNC: 11 MMOL/L (ref 7–15)
ANION GAP SERPL CALCULATED.3IONS-SCNC: 15 MMOL/L (ref 7–15)
AST SERPL W P-5'-P-CCNC: 17 U/L (ref 0–45)
B-OH-BUTYR SERPL-SCNC: 0.3 MMOL/L
BASOPHILS # BLD AUTO: 0.1 10E3/UL (ref 0–0.2)
BASOPHILS NFR BLD AUTO: 1 %
BILIRUB SERPL-MCNC: 0.5 MG/DL
BUN SERPL-MCNC: 11.8 MG/DL (ref 8–23)
BUN SERPL-MCNC: 15.3 MG/DL (ref 8–23)
CALCIUM SERPL-MCNC: 9.5 MG/DL (ref 8.6–10)
CALCIUM SERPL-MCNC: 9.8 MG/DL (ref 8.6–10)
CHLORIDE SERPL-SCNC: 101 MMOL/L (ref 98–107)
CHLORIDE SERPL-SCNC: 105 MMOL/L (ref 98–107)
CREAT SERPL-MCNC: 0.59 MG/DL (ref 0.67–1.17)
CREAT SERPL-MCNC: 0.67 MG/DL (ref 0.67–1.17)
DEPRECATED HCO3 PLAS-SCNC: 18 MMOL/L (ref 22–29)
DEPRECATED HCO3 PLAS-SCNC: 21 MMOL/L (ref 22–29)
EOSINOPHIL # BLD AUTO: 0.1 10E3/UL (ref 0–0.7)
EOSINOPHIL NFR BLD AUTO: 2 %
ERYTHROCYTE [DISTWIDTH] IN BLOOD BY AUTOMATED COUNT: 14.1 % (ref 10–15)
GFR SERPL CREATININE-BSD FRML MDRD: >90 ML/MIN/1.73M2
GFR SERPL CREATININE-BSD FRML MDRD: >90 ML/MIN/1.73M2
GLUCOSE BLDC GLUCOMTR-MCNC: 172 MG/DL (ref 70–99)
GLUCOSE BLDC GLUCOMTR-MCNC: 202 MG/DL (ref 70–99)
GLUCOSE BLDC GLUCOMTR-MCNC: 204 MG/DL (ref 70–99)
GLUCOSE BLDC GLUCOMTR-MCNC: 268 MG/DL (ref 70–99)
GLUCOSE BLDC GLUCOMTR-MCNC: 390 MG/DL (ref 70–99)
GLUCOSE SERPL-MCNC: 294 MG/DL (ref 70–99)
GLUCOSE SERPL-MCNC: 432 MG/DL (ref 70–99)
HCT VFR BLD AUTO: 39.4 % (ref 40–53)
HGB BLD-MCNC: 12.7 G/DL (ref 13.3–17.7)
HOLD SPECIMEN: NORMAL
IMM GRANULOCYTES # BLD: 0.1 10E3/UL
IMM GRANULOCYTES NFR BLD: 1 %
LACTATE SERPL-SCNC: 1 MMOL/L (ref 0.7–2)
LIPASE SERPL-CCNC: 42 U/L (ref 13–60)
LYMPHOCYTES # BLD AUTO: 1.5 10E3/UL (ref 0.8–5.3)
LYMPHOCYTES NFR BLD AUTO: 22 %
MAGNESIUM SERPL-MCNC: 1.7 MG/DL (ref 1.7–2.3)
MCH RBC QN AUTO: 27.7 PG (ref 26.5–33)
MCHC RBC AUTO-ENTMCNC: 32.2 G/DL (ref 31.5–36.5)
MCV RBC AUTO: 86 FL (ref 78–100)
MONOCYTES # BLD AUTO: 0.7 10E3/UL (ref 0–1.3)
MONOCYTES NFR BLD AUTO: 10 %
NEUTROPHILS # BLD AUTO: 4.6 10E3/UL (ref 1.6–8.3)
NEUTROPHILS NFR BLD AUTO: 64 %
NRBC # BLD AUTO: 0 10E3/UL
NRBC BLD AUTO-RTO: 0 /100
OSMOLALITY SERPL: 300 MMOL/KG (ref 275–295)
PHOSPHATE SERPL-MCNC: 2.8 MG/DL (ref 2.5–4.5)
PLATELET # BLD AUTO: 326 10E3/UL (ref 150–450)
POTASSIUM SERPL-SCNC: 3.9 MMOL/L (ref 3.4–5.3)
POTASSIUM SERPL-SCNC: 4.4 MMOL/L (ref 3.4–5.3)
PROT SERPL-MCNC: 7 G/DL (ref 6.4–8.3)
RBC # BLD AUTO: 4.59 10E6/UL (ref 4.4–5.9)
SODIUM SERPL-SCNC: 134 MMOL/L (ref 136–145)
SODIUM SERPL-SCNC: 137 MMOL/L (ref 136–145)
WBC # BLD AUTO: 7 10E3/UL (ref 4–11)

## 2023-06-21 PROCEDURE — 83605 ASSAY OF LACTIC ACID: CPT

## 2023-06-21 PROCEDURE — 250N000011 HC RX IP 250 OP 636: Performed by: EMERGENCY MEDICINE

## 2023-06-21 PROCEDURE — 83690 ASSAY OF LIPASE: CPT | Performed by: EMERGENCY MEDICINE

## 2023-06-21 PROCEDURE — 82962 GLUCOSE BLOOD TEST: CPT

## 2023-06-21 PROCEDURE — 99253 IP/OBS CNSLTJ NEW/EST LOW 45: CPT | Mod: GC | Performed by: SURGERY

## 2023-06-21 PROCEDURE — 74018 RADEX ABDOMEN 1 VIEW: CPT

## 2023-06-21 PROCEDURE — 99222 1ST HOSP IP/OBS MODERATE 55: CPT | Mod: GC | Performed by: STUDENT IN AN ORGANIZED HEALTH CARE EDUCATION/TRAINING PROGRAM

## 2023-06-21 PROCEDURE — 74018 RADEX ABDOMEN 1 VIEW: CPT | Mod: 26 | Performed by: RADIOLOGY

## 2023-06-21 PROCEDURE — 120N000002 HC R&B MED SURG/OB UMMC

## 2023-06-21 PROCEDURE — 74176 CT ABD & PELVIS W/O CONTRAST: CPT | Mod: 26 | Performed by: RADIOLOGY

## 2023-06-21 PROCEDURE — 80053 COMPREHEN METABOLIC PANEL: CPT | Performed by: EMERGENCY MEDICINE

## 2023-06-21 PROCEDURE — 250N000012 HC RX MED GY IP 250 OP 636 PS 637

## 2023-06-21 PROCEDURE — 84100 ASSAY OF PHOSPHORUS: CPT

## 2023-06-21 PROCEDURE — 36415 COLL VENOUS BLD VENIPUNCTURE: CPT

## 2023-06-21 PROCEDURE — 83735 ASSAY OF MAGNESIUM: CPT

## 2023-06-21 PROCEDURE — 74176 CT ABD & PELVIS W/O CONTRAST: CPT

## 2023-06-21 PROCEDURE — 83930 ASSAY OF BLOOD OSMOLALITY: CPT

## 2023-06-21 PROCEDURE — 250N000013 HC RX MED GY IP 250 OP 250 PS 637

## 2023-06-21 PROCEDURE — 82010 KETONE BODYS QUAN: CPT

## 2023-06-21 PROCEDURE — 258N000003 HC RX IP 258 OP 636: Performed by: EMERGENCY MEDICINE

## 2023-06-21 PROCEDURE — 36415 COLL VENOUS BLD VENIPUNCTURE: CPT | Performed by: EMERGENCY MEDICINE

## 2023-06-21 PROCEDURE — 85025 COMPLETE CBC W/AUTO DIFF WBC: CPT | Performed by: EMERGENCY MEDICINE

## 2023-06-21 RX ORDER — NALOXONE HYDROCHLORIDE 0.4 MG/ML
0.2 INJECTION, SOLUTION INTRAMUSCULAR; INTRAVENOUS; SUBCUTANEOUS
Status: DISCONTINUED | OUTPATIENT
Start: 2023-06-21 | End: 2023-06-21 | Stop reason: HOSPADM

## 2023-06-21 RX ORDER — DEXTROSE MONOHYDRATE 25 G/50ML
25-50 INJECTION, SOLUTION INTRAVENOUS
Status: DISCONTINUED | OUTPATIENT
Start: 2023-06-21 | End: 2023-06-21 | Stop reason: HOSPADM

## 2023-06-21 RX ORDER — MAGNESIUM HYDROXIDE/ALUMINUM HYDROXICE/SIMETHICONE 120; 1200; 1200 MG/30ML; MG/30ML; MG/30ML
15 SUSPENSION ORAL ONCE
Status: DISCONTINUED | OUTPATIENT
Start: 2023-06-21 | End: 2023-06-21

## 2023-06-21 RX ORDER — BUPROPION HYDROCHLORIDE 150 MG/1
150 TABLET ORAL DAILY
Status: DISCONTINUED | OUTPATIENT
Start: 2023-06-21 | End: 2023-06-21 | Stop reason: HOSPADM

## 2023-06-21 RX ORDER — HYDROMORPHONE HYDROCHLORIDE 1 MG/ML
0.5 INJECTION, SOLUTION INTRAMUSCULAR; INTRAVENOUS; SUBCUTANEOUS ONCE
Status: COMPLETED | OUTPATIENT
Start: 2023-06-21 | End: 2023-06-21

## 2023-06-21 RX ORDER — ACETAMINOPHEN 500 MG
500-1000 TABLET ORAL EVERY 6 HOURS PRN
Status: DISCONTINUED | OUTPATIENT
Start: 2023-06-21 | End: 2023-06-21 | Stop reason: HOSPADM

## 2023-06-21 RX ORDER — NALOXONE HYDROCHLORIDE 0.4 MG/ML
0.4 INJECTION, SOLUTION INTRAMUSCULAR; INTRAVENOUS; SUBCUTANEOUS
Status: DISCONTINUED | OUTPATIENT
Start: 2023-06-21 | End: 2023-06-21 | Stop reason: HOSPADM

## 2023-06-21 RX ORDER — ONDANSETRON 4 MG/1
4 TABLET, ORALLY DISINTEGRATING ORAL EVERY 6 HOURS PRN
Status: DISCONTINUED | OUTPATIENT
Start: 2023-06-21 | End: 2023-06-21 | Stop reason: HOSPADM

## 2023-06-21 RX ORDER — ONDANSETRON 2 MG/ML
4 INJECTION INTRAMUSCULAR; INTRAVENOUS EVERY 6 HOURS PRN
Status: DISCONTINUED | OUTPATIENT
Start: 2023-06-21 | End: 2023-06-21 | Stop reason: HOSPADM

## 2023-06-21 RX ORDER — FAMOTIDINE 20 MG/1
40 TABLET, FILM COATED ORAL DAILY
Status: DISCONTINUED | OUTPATIENT
Start: 2023-06-21 | End: 2023-06-21 | Stop reason: HOSPADM

## 2023-06-21 RX ORDER — LIDOCAINE 40 MG/G
CREAM TOPICAL
Status: DISCONTINUED | OUTPATIENT
Start: 2023-06-21 | End: 2023-06-21 | Stop reason: HOSPADM

## 2023-06-21 RX ORDER — SODIUM CHLORIDE 9 MG/ML
INJECTION, SOLUTION INTRAVENOUS CONTINUOUS
Status: DISCONTINUED | OUTPATIENT
Start: 2023-06-21 | End: 2023-06-21 | Stop reason: HOSPADM

## 2023-06-21 RX ORDER — HYDROMORPHONE HYDROCHLORIDE 1 MG/ML
0.5 INJECTION, SOLUTION INTRAMUSCULAR; INTRAVENOUS; SUBCUTANEOUS EVERY 4 HOURS PRN
Status: DISCONTINUED | OUTPATIENT
Start: 2023-06-21 | End: 2023-06-21 | Stop reason: HOSPADM

## 2023-06-21 RX ORDER — NICOTINE POLACRILEX 4 MG
15-30 LOZENGE BUCCAL
Status: DISCONTINUED | OUTPATIENT
Start: 2023-06-21 | End: 2023-06-21 | Stop reason: HOSPADM

## 2023-06-21 RX ADMIN — HYDROMORPHONE HYDROCHLORIDE 0.5 MG: 1 INJECTION, SOLUTION INTRAMUSCULAR; INTRAVENOUS; SUBCUTANEOUS at 04:17

## 2023-06-21 RX ADMIN — INSULIN ASPART 2 UNITS: 100 INJECTION, SOLUTION INTRAVENOUS; SUBCUTANEOUS at 18:02

## 2023-06-21 RX ADMIN — ONDANSETRON 4 MG: 2 INJECTION INTRAMUSCULAR; INTRAVENOUS at 04:15

## 2023-06-21 RX ADMIN — BICTEGRAVIR SODIUM, EMTRICITABINE, AND TENOFOVIR ALAFENAMIDE FUMARATE 1 TABLET: 50; 200; 25 TABLET ORAL at 18:04

## 2023-06-21 RX ADMIN — INSULIN ASPART 6 UNITS: 100 INJECTION, SOLUTION INTRAVENOUS; SUBCUTANEOUS at 09:37

## 2023-06-21 RX ADMIN — FAMOTIDINE 40 MG: 20 TABLET, FILM COATED ORAL at 18:09

## 2023-06-21 RX ADMIN — SODIUM CHLORIDE 1000 ML: 9 INJECTION, SOLUTION INTRAVENOUS at 04:15

## 2023-06-21 RX ADMIN — FAMOTIDINE 20 MG: 10 INJECTION, SOLUTION INTRAVENOUS at 00:59

## 2023-06-21 RX ADMIN — INSULIN ASPART 3 UNITS: 100 INJECTION, SOLUTION INTRAVENOUS; SUBCUTANEOUS at 14:57

## 2023-06-21 RX ADMIN — DIATRIZOATE MEGLUMINE AND DIATRIZOATE SODIUM 75 ML: 660; 100 SOLUTION ORAL; RECTAL at 11:23

## 2023-06-21 ASSESSMENT — ACTIVITIES OF DAILY LIVING (ADL)
ADLS_ACUITY_SCORE: 37

## 2023-06-21 NOTE — PROGRESS NOTES
1200: Pt refusing oral medications.  Pt sleeping.  This RN woke patient and asked him to take meds and he said no and went back to sleep.  Pt rated pain 8/10 and is sound asleep between cares.

## 2023-06-21 NOTE — DISCHARGE INSTRUCTIONS
TODAY'S VISIT:  You were seen today for abdominal pain   -   - If you had any labs or imaging/radiology tests performed today, you should also discuss these tests with your usual provider.     FOLLOW-UP:  Please make an appointment to follow up with:  - Your Primary Care Provider. If you do not have a PCP, please call the [facility] (phone: [phone] for an appointment    - Have your provider review the results from today's visit with you again to make sure no further follow-up or additional testing is needed based on those results.     RETURN TO THE EMERGENCY DEPARTMENT  Return to the Emergency Department at any time for any new or worsening symptoms or any concerns.

## 2023-06-21 NOTE — ED NOTES
Bed: ED07  Expected date: 6/20/23  Expected time: 11:39 PM  Means of arrival:   Comments:  57M - Abd pain, hx bowel obst,

## 2023-06-21 NOTE — H&P
"[facility]    History and Physical - Medicine Service, MARZEN TEAM        Date of Admission:  6/20/2023    Assessment & Plan      Hospital Course  Andrew Collier was admitted on 6/20/2023. 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     #Partial SBO  #Recurrent partial SBO  Patient presented with acute onset of L sided abdominal pain, nausea, and vomiting since  5pm on the day of admission. Has had small bm recently and has been passing gas. CT showing \"low to intermediate grade mechanical obstruction\". Vitally stable. Pt had three large volume vomitus with significant improvement in pain while in the ED. 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  - monitor electrolyte and replace for K 4+, Mg 2+ and normal P    #DM2, poorly controlled, a1c 11.5%  #Hyperglycemia  Glucose ~400 on admit. Pt with multiple risk factors including housing instability and insurance coverage. Was seen by endocrine during prior hospitalizations. No AG, bicarb low, will rule out HHS  - HDSS  - Hold pto metformin and Januvia 100 mg   - hypoglycemia protocol   - UA  - Ketones  - Osm    #Pseudohyonatremia 2/2 hyperglycemia  Na 134 on admit, but normal range after hyperglycemia " "correction  - glucose management as above    #HIV  Per prior discharge note, :\"- due for ID follow-up 4/2023 (last saw ID 1/2023, did not have insurance so had to defer management until insurance obtained again)\"  - PTA biktarvy  - Absolute CD4 count - 330 as of 5/6/23     #GERD  Endorsing active symptoms  - cont PTA famotidine as tolerated  - consider IV ppi if cannot tolerate PO    Diet:   NPO  DVT Prophylaxis: Low Risk/Ambulatory with no VTE prophylaxis indicated  Potts Catheter: Not present  Fluids: as above  Lines: None     Cardiac Monitoring: None  Code Status: Full    Clinically Significant Risk Factors Present on Admission                      # DMII: A1C = 11.5 % (Ref range: <5.7 %) within past 6 months    # Overweight: Estimated body mass index is 27.07 kg/m  as calculated from the following:    Height as of this encounter: 1.575 m (5' 2\").    Weight as of this encounter: 67.1 kg (148 lb).            Disposition Plan  TBD      Expected Discharge Date: 06/23/2023                The patient's care will be staffed in the     MARGIE SALDAÑA MD  Medicine Service, [facility]  Securely message with [place] (more info)  Text page via [facility] Paging/Directory   See signed in provider for up to date coverage information  ______________________________________________________________________    Chief Complaint   N, Vomiting and abdominal pain    History is obtained from the patient    History of Present Illness   Andrew Collier was admitted on 6/20/2023. 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     Started having nausea and abdominal pain around 5 pm on 6/20/2023; sx where followed by " emesis without blood. Abdominal pain is intermittent, severe , non radiating and mostly on the right lower part of the belly. Had diarrhea on 6/19, and a small BM 6/20. Has been passing gas. Denies dysuria, fever, or recent URI. No recent travel. No hx of liver disease or pancreatic disease    Medication compliance and access could not be assessed during the interview.     ED course: vitally stable, received fluid bolus; labs showing hyperglycemia. Imaging showing evidence of partial SBO. Gen surg consulted - no surgical intervention planned.      Past Medical History    Past Medical History:   Diagnosis Date     Acute appendicitis with localized peritonitis 1/31/2018     AIDS (H)      Allergic rhinitis due to other allergen     DNS     Anal dysplasia      Chronic abdominal pain      CNS toxoplasmosis (H)      COVID-19 1/11/2022     Diabetes type 2, controlled (H)      GERD (gastroesophageal reflux disease)      Herpes zoster 9/23/2016     History of seizure      History of substance abuse (H)      HIV (human immunodeficiency virus infection) (H)      HLD (hyperlipidemia)      Lung nodules      Periungual wart      Pneumonia 1/6/2019     PTSD (post-traumatic stress disorder)      Sleep apnea     doesn't use CPAP       Past Surgical History   Past Surgical History:   Procedure Laterality Date     ANOSCOPY N/A 9/9/2020    Procedure: Exam Under Anesthesia, ANOSCOPY, fulgeration of rectal fissures with Rectal Biopsies;  Surgeon: Thanh Lundberg MD;  [facility]     COLONOSCOPY Left 1/22/2016    Procedure: COMBINED COLONOSCOPY, SINGLE OR MULTIPLE BIOPSY/POLYPECTOMY BY BIOPSY;  Surgeon: Clark Saini MD;  [facility]: [facility] GI     ESOPHAGOSCOPY, GASTROSCOPY, DUODENOSCOPY (EGD), COMBINED N/A 6/6/2022    Procedure: ESOPHAGOGASTRODUODENOSCOPY, WITH BIOPSY;  Surgeon: Kecia Benítez MD;  Location: [facility] GI     HC EXPLORE UNDESC TESTIS,INGUIN/SCROTAL       LAPAROSCOPIC APPENDECTOMY N/A 1/31/2018    Procedure: LAPAROSCOPIC  APPENDECTOMY;  LAPAROSCOPIC APPENDECTOMY;  Surgeon: Dawn Holt MD;  [facility]     LAPAROSCOPY DIAGNOSTIC (GENERAL) N/A 7/26/2016    Procedure: LAPAROSCOPY DIAGNOSTIC (GENERAL);  Surgeon: Susannah Arriaga MD;  [facility]     LAPAROSCOPY DIAGNOSTIC (GENERAL) N/A 4/16/2018    Procedure: LAPAROSCOPY DIAGNOSTIC (GENERAL);  Diagnostic laparoscopy and lysis of adhesions;  Surgeon: Prince Dowling MD;  [facility]     OPTICAL TRACKING SYSTEM CRANIOTOMY, EXCISE TUMOR, COMBINED Left 4/10/2015    Procedure: COMBINED OPTICAL TRACKING SYSTEM CRANIOTOMY, EXCISE TUMOR;  Surgeon: Mirlande Colmenares MD;  [facility]     REPAIR GAMEKEEPER'S THUMB Right 12/2/2016    Procedure: REPAIR LIGAMENT ULNAR COLLATERAL THUMB (GAMEKEEPER'S);  Surgeon: Evin Zamorano MD;  Location:  OR     ZC NONSPECIFIC PROCEDURE      right forearm fracture       Prior to Admission Medications   Prior to Admission Medications   Prescriptions Last Dose Informant Patient Reported? Taking?   acetaminophen (TYLENOL) 500 MG tablet   No No   Sig: Take 1-2 tablets (500-1,000 mg) by mouth every 6 hours as needed for mild pain   bictegravir-emtricitabine-tenofovir (BIKTARVY) -25 MG per tablet  Self No No   Sig: Take 1 tablet by mouth daily   blood glucose (NO BRAND SPECIFIED) lancets standard  Self No No   Sig: Use to test blood sugar 1 time daily or as directed.   blood glucose (NO BRAND SPECIFIED) test strip  Self No No   Sig: Use to test blood sugar 1 time daily or as directed.   blood glucose monitoring (NO BRAND SPECIFIED) meter device kit  Self No No   Sig: Use to test blood sugar 1 time daily or as directed.   buPROPion (WELLBUTRIN XL) 150 MG 24 hr tablet  Self No No   Sig: Take 1 tablet (150 mg) by mouth daily   famotidine (PEPCID) 40 MG tablet  Self No No   Sig: Take 1 tablet (40 mg) by mouth daily   glimepiride (AMARYL) 4 MG tablet  Self No No   Sig: Take 1 tablet (4 mg) by mouth 2 times daily  (before meals)   ibuprofen (ADVIL/MOTRIN) 600 MG tablet  Self No No   Sig: Take 1 tablet (600 mg) by mouth every 6 hours as needed for moderate pain (4-6)   ibuprofen (ADVIL/MOTRIN) 600 MG tablet   No No   Sig: Take 1 tablet (600 mg) by mouth every 6 hours as needed for moderate pain   levofloxacin (LEVAQUIN) 500 MG tablet  Self No No   Sig: Take 1 tablet (500 mg) by mouth daily   metFORMIN (FORTAMET) 1000 MG 24 hr tablet  Self No No   Sig: Take 2 tablets (2,000 mg) by mouth daily (with dinner)   Patient taking differently: Take 3,000 mg by mouth daily (with dinner)   sitagliptin (JANUVIA) 100 MG tablet  Self No No   Sig: Take 1 tablet (100 mg) by mouth daily   sulfamethoxazole-trimethoprim (BACTRIM DS) 800-160 MG tablet  Self No No   Sig: Take 1 tablet by mouth 2 times daily      Facility-Administered Medications: None       Physical Exam   Vital Signs: Temp: 98.5  F (36.9  C) Temp src: Oral BP: 136/89 Pulse: 86   Resp: 16 SpO2: 99 % O2 Device: None (Room air)    Weight: 148 lbs 0 oz    General: Pt NAD  Card: RRR, normal S1 and S2  Lung: Clear; equal BS bilaterally  Abdomen:  soft and tender mostly in the umbilical and RUQ area, nondistended, no organomegaly, BS++  Neuro: Alert and oriented  Extremities: No pedal edema    Medical Decision Making       NOTE(S)/MEDICAL RECORDS REVIEWED over the past 24 hours: Prior admission summaries      Data     I have personally reviewed the following data over the past 24 hrs:    7.0  \   12.7 (L)   / 326     134 (L) 101 15.3 /  390 (H)   4.4 18 (L) 0.67 \       ALT: 16 AST: 17 AP: 111 TBILI: 0.5   ALB: 4.0 TOT PROTEIN: 7.0 LIPASE: 42       Procal: N/A CRP: N/A Lactic Acid: 1.0         Imaging results reviewed over the past 24 hrs:   Recent Results (from the past 24 hour(s))   XR Pelvis w Hip Right 1 View    Narrative    EXAM: XR PELVIS AND HIP RIGHT 1 VIEW  6/20/2023 2:54 PM      HISTORY: Trauma; pain    COMPARISON: CT 5/6/2023    FINDINGS: AP pelvis and frog-leg lateral right  hip views were  obtained.    No acute osseous abnormality. Minimal degenerative changes of the  hips. Penile prosthesis.       Impression    IMPRESSION: No acute osseous abnormality.    MARGIE GODWIN MD (Joe)         SYSTEM ID:  [id number]   Ankle XR, G/E 3 views, right    Narrative    3 views right ankle radiographs 6/20/2023 3:16 PM    History: Trauma; pain     Comparison: 12/29/2019    Findings:    Nonweightbearing AP, oblique, and lateral  views of the right ankle  were obtained.     No acute osseous abnormality.      Ankle mortise and syndesmosis are congruent on this non-weight bearing  study.     Mild soft tissue swelling over the lateral malleolus.      Impression    Impression:  1. No acute osseous abnormality.  2. No substantial degenerative change.    MARGIE GODWIN MD (Joe)         SYSTEM ID:  [id number]   XR Shoulder Right G/E 3 Views    Narrative    4 views right shoulder radiographs 6/20/2023 3:14 PM    History: Trauma; pain     Comparison: 10/24/2019    Findings:    AP, Grashey, transscapular Y, axillary  views of the right shoulder  were obtained.     No acute osseous abnormality. Glenohumeral and acromioclavicular  joints are congruent.    Mild degenerative changes of the acromioclavicular joint. No  substantial degenerative change of the glenohumeral joint.    Soft tissue is unremarkable. The visualized lung is clear.      Impression    Impression:  1. No acute osseous abnormality.  2. No substantial degenerative change.    MARGIE GODWIN MD (Joe)         SYSTEM ID:  [id number]   CT Abdomen Pelvis w/o Contrast    Narrative    EXAM: CT ABDOMEN PELVIS W/O CONTRAST  LOCATION: [facility]  DATE: 6/21/2023    INDICATION: abd pain, hx SBO  COMPARISON: 05/06/2023  TECHNIQUE: CT scan of the abdomen and pelvis was performed without IV contrast. Multiplanar reformats were obtained. Dose reduction techniques were used.  CONTRAST: None.    FINDINGS:    LOWER CHEST: Normal.    HEPATOBILIARY: No significant mass or bile duct dilatation. No calcified gallstones.     PANCREAS: No significant mass, duct dilatation, or inflammatory change.    SPLEEN: Normal size.    ADRENAL GLANDS: No significant nodules.    KIDNEYS/BLADDER: No significant mass, stone, or hydronephrosis.    BOWEL: A few dilated loops of mid jejunum are seen in the left midabdomen, with a apparent transition point seen mid abdomen (image 38, series 2). The bowel distal to this point is decompressed and normal in size and caliber. There is no evidence of   pneumatosis or bowel perforation. The large bowel is unremarkable. (    LYMPH NODES: No lymphadenopathy.    VASCULATURE: No abdominal aortic aneurysm.    PELVIC ORGANS: A penile prosthesis and reservoir are again noted with reservoir again seen anterior the bladder, unchanged from prior exam.    MUSCULOSKELETAL: Normal.      Impression    IMPRESSION:   1.  A few dilated loops of mid jejunum seen in the left mid abdomen with an apparent transition point seen in the mid abdomen (image 38, series 2) consistent with a low to intermediate grade mechanical obstruction, when compared with 05/06/2023, the   findings are not as pronounced as on the prior exam.

## 2023-06-21 NOTE — ED TRIAGE NOTES
Presents to ED tonight after sudden onset of left sided abd pain.  Pt had diarrhea BM around 1700 and states sudden pain after.  Hx of bowel obstruction, per patient.  Pt arrived in department via EMS, from shelter with a PTA BG of 425.  States he has been taking his metformin.  Received 75mcg of fentanyl PTA and 4mg zofran via IV administration.  Pt states pain remains 7/10, also complains of GERD, RN sat patient up, as patient was laying flat.      Triage Assessment     Row Name 06/20/23 [id number]       Triage Assessment (Adult)    Airway WDL WDL       Respiratory WDL    Respiratory WDL WDL       Skin Circulation/Temperature WDL    Skin Circulation/Temperature WDL WDL       Cardiac WDL    Cardiac WDL WDL       Peripheral/Neurovascular WDL    Peripheral Neurovascular WDL WDL       Cognitive/Neuro/Behavioral WDL    Cognitive/Neuro/Behavioral WDL WDL

## 2023-06-21 NOTE — PROGRESS NOTES
Pt appears to have multiple episodes of emesis in the past hour or so. Pt is declining medicinal interventions to help with N & V.

## 2023-06-21 NOTE — CONSULTS
"EGS Surgery Consult  2023    Andrew Collier  : 1965    Date of Service: 2023 4:40 AM    Assessment and Plan:  Andrew Collier is a 57 year old male with a history of HIV, poorly controlled diabetes, housing insecurity, substance abuse, and recurrent SBO who presented to the ED with evidence of a recurrent SBO. Exam is reassuring without peritoneal signs.  Recommend admission to medicine for nonoperative management.  Unfortunately, patient has a history of noncompliance with NG tube and leaving AMA.    -Admit to medicine  -Recommend NPO, NGT to LIS  -Replete electrolytes K>4, Phos>3, Mag>2  -General surgery will continue to follow    Patient and plan discussed with Dr. Meza-Luly Mcintyre MD  General Surgery Resident  --------------------------------------------------------------------  History of Present Illness:    Andrew Collier is a 57 year old male with a history notable for HIV, methamphetamine use disorder, poorly controlled DM, housing insecurity, hx of perforated appendicitis s/p appendectomy, recurrent bowel obstructions, most recently on  presented to the ED this evening for evaluation of abdominal pain and nausea.  He states that around 5 PM, he had a BM and also developed left-sided abdominal pain with associated nausea.  He denies vomiting.  The pain is constant.  He has not passed gas since the pain began and says this feels similar to his previous SBO. He is refusing NG tube and states that he only needs pain medication for this to resolve.      Per past GI notes \"Most plausibly his chronic obstructive episodes are 2/2 to a past HIV related enteritis (histoplasmosis or otherwise) that has resolved and left him with multiple segment of SB stricturing. Surgery did not find extensive adhesions on dx laparoscopy in 2018. MR enterography in 2019 similarly did not show active enteritis. No evidence to suggest lymphoma, infection, Whipple's " "or extremely poor motility as culprit.\"    Past Medical History:  Past Medical History:   Diagnosis Date     Acute appendicitis with localized peritonitis 1/31/2018     AIDS (H)      Allergic rhinitis due to other allergen     DNS     Anal dysplasia      Chronic abdominal pain      CNS toxoplasmosis (H)      COVID-19 1/11/2022     Diabetes type 2, controlled (H)      GERD (gastroesophageal reflux disease)      Herpes zoster 9/23/2016     History of seizure      History of substance abuse (H)      HIV (human immunodeficiency virus infection) (H)      HLD (hyperlipidemia)      Lung nodules      Periungual wart      Pneumonia 1/6/2019     PTSD (post-traumatic stress disorder)      Sleep apnea     doesn't use CPAP       Past Surgical History  Past Surgical History:   Procedure Laterality Date     ANOSCOPY N/A 9/9/2020    Procedure: Exam Under Anesthesia, ANOSCOPY, fulgeration of rectal fissures with Rectal Biopsies;  Surgeon: Thanh Lundberg MD;  [facility]     COLONOSCOPY Left 1/22/2016    Procedure: COMBINED COLONOSCOPY, SINGLE OR MULTIPLE BIOPSY/POLYPECTOMY BY BIOPSY;  Surgeon: Clark Saini MD;  [facility]     ESOPHAGOSCOPY, GASTROSCOPY, DUODENOSCOPY (EGD), COMBINED N/A 6/6/2022    Procedure: ESOPHAGOGASTRODUODENOSCOPY, WITH BIOPSY;  Surgeon: Kecia Benítez MD;  Location: [facility]     HC EXPLORE UNDESC TESTIS,INGUIN/SCROTAL       LAPAROSCOPIC APPENDECTOMY N/A 1/31/2018    Procedure: LAPAROSCOPIC APPENDECTOMY;  LAPAROSCOPIC APPENDECTOMY;  Surgeon: Dawn Holt MD;  [facility]     LAPAROSCOPY DIAGNOSTIC (GENERAL) N/A 7/26/2016    Procedure: LAPAROSCOPY DIAGNOSTIC (GENERAL);  Surgeon: Susannah Arriaga MD;  [facility]     LAPAROSCOPY DIAGNOSTIC (GENERAL) N/A 4/16/2018    Procedure: LAPAROSCOPY DIAGNOSTIC (GENERAL);  Diagnostic laparoscopy and lysis of adhesions;  Surgeon: Prince Dowling MD;  [facility]     OPTICAL TRACKING SYSTEM CRANIOTOMY, EXCISE TUMOR, " COMBINED Left 4/10/2015    Procedure: COMBINED OPTICAL TRACKING SYSTEM CRANIOTOMY, EXCISE TUMOR;  Surgeon: Mirlande Colmenares MD;  [facility]     REPAIR GAMEKEEPER'S THUMB Right 12/2/2016    Procedure: REPAIR LIGAMENT ULNAR COLLATERAL THUMB (GAMEKEEPER'S);  Surgeon: Evin Zamorano MD;  Location:  OR     [facility] NONSPECIFIC PROCEDURE      right forearm fracture       Family History:  Family History   Problem Relation Age of Onset     Diabetes Brother      Diabetes Father      Alzheimer Disease Father      Unknown/Adopted Mother      Diabetes Paternal Grandfather      Cancer No family hx of         no skin cancer     Skin Cancer No family hx of         no famiy hx of skin cancer     Glaucoma No family hx of      Macular Degeneration No family hx of        Social History:  Social History     Socioeconomic History     Marital status: Single     Spouse name: Not on file     Number of children: Not on file     Years of education: Not on file     Highest education level: Not on file   Occupational History     Occupation:      Comment: 5 years; temp agency     Occupation: Unemployed   Tobacco Use     Smoking status: Some Days     Packs/day: 0.25     Years: 40.00     Pack years: 10.00     Types: Cigarettes     Smokeless tobacco: Former   Substance and Sexual Activity     Alcohol use: No     Alcohol/week: 0.0 standard drinks of alcohol     Comment: Last etoh in 2007     Drug use: Not Currently     Types: Marijuana, Methamphetamines     Sexual activity: Not Currently     Partners: Female, Male     Comment: Last sexual activity 2008   Other Topics Concern     Parent/sibling w/ CABG, MI or angioplasty before 65F 55M? Not Asked   Social History Narrative    Born in [facility].  Came to the [place] in [place].  Last traveled to visit family in 2008.        1/7/2019 - Homeless. Working with a  at [employer] trying to get housing. Sexually active with two partners recently using condoms 100% of the  "time. Smokes occasionally, not for the last 4 weeks.        1/7/2020 at [facility] since 1/1/2020. 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  Care established with ID and endocrine     Social Determinants of Health     Financial Resource Strain: Not on file   Food Insecurity: Not on file   Transportation Needs: Not on file   Physical Activity: Not on file   Stress: Not on file   Social Connections: Not on file   Intimate Partner Violence: Not on file   Housing Stability: Not on file       Medications:  Current Facility-Administered Medications   Medication     0.9% sodium chloride BOLUS    Followed by     sodium chloride 0.9% infusion     ondansetron (ZOFRAN) injection 4 mg     Current Outpatient Medications   Medication     acetaminophen (TYLENOL) 500 MG tablet     bictegravir-emtricitabine-tenofovir (BIKTARVY) -25 MG per tablet     blood glucose (NO BRAND SPECIFIED) lancets standard     blood glucose (NO BRAND SPECIFIED) test strip     blood glucose monitoring (NO BRAND SPECIFIED) meter device kit     buPROPion (WELLBUTRIN XL) 150 MG 24 hr tablet     famotidine (PEPCID) 40 MG tablet     glimepiride (AMARYL) 4 MG tablet     ibuprofen (ADVIL/MOTRIN) 600 MG tablet     ibuprofen (ADVIL/MOTRIN) 600 MG tablet     levofloxacin (LEVAQUIN) 500 MG tablet     metFORMIN (FORTAMET) 1000 MG 24 hr tablet     sitagliptin (JANUVIA) 100 MG tablet     sulfamethoxazole-trimethoprim (BACTRIM DS) 800-160 MG tablet       Allergies:  Allergies   Allergen Reactions     Fentanyl Blisters     Per pt, after taking this medication had blisters develope     Tylenol [Acetaminophen] Itching     Dulaglutide Rash     Insulin Lispro Rash     Patient reported     Penicillin V Other (See Comments) and Rash     Diffuse maculopapular rash + feels \"high\", per pt.        Review of Symptoms:  A 10 point review of symptoms has been conducted and is negative except for that mentioned in the above HPI.    Physical Exam:  Blood pressure 136/89, pulse 86, temperature " "98.5  F (36.9  C), temperature source Oral, resp. rate 16, height 1.575 m (5' 2\"), weight 67.1 kg (148 lb), SpO2 100 %.  Gen:    Lying in bed in NAD  HEENT: Normocephalic and atraumatic  CV:  RRR  Pulm:  Non-labored breathing  Abd:  Soft, mildly distended, tender to palpation in the LUQ>LLQ, right side of abdomen is nontender, no peritoneal signs  Ext:  Warm and well perfused, no obvious deformities    Labs:  All labs reviewed, notable for:  Lactate 1.0  WBC 7.0  Hemoglobin 12.7    Imaging:  CT Abdomen Pelvis w/o Contrast  Result Date: 6/21/2023  IMPRESSION: 1.  A few dilated loops of mid jejunum seen in the left mid abdomen with an apparent transition point seen in the mid abdomen (image 38, series 2) consistent with a low to intermediate grade mechanical obstruction, when compared with 05/06/2023, the findings are not as pronounced as on the prior exam.         "

## 2023-06-21 NOTE — ED NOTES
Pt had 3 large episodes of emesis in room on floor.  Pt states abd feels better.  Pt continues to refuse NG tube at this time.  Resident at bedside, states we will cont to monitor.  Medications and fluids administered as ordered.

## 2023-06-21 NOTE — PROGRESS NOTES
"[facility]    Progress Note - Medicine Service, MARZ[facility] TEAM 5       Date of Admission:  6/20/2023    Assessment & Plan   Andrew Collier was admitted on 6/20/2023. 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     Today:  - Continue NPO  - Gastrograffin challenge today     Partial SBO  Recurrent partial SBO  Patient presented with acute onset of L sided abdominal pain, nausea, and vomiting since  5pm on the day of admission. CT showing \"low to intermediate grade mechanical obstruction\". Vitally stable. Pt had three large volume vomitus with significant improvement in pain while in the ED. 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 No adhesions seen on prior laparoscopy.  - General surgery following, appreciate recs  - NS @ 125 ml/hr  - NPO  - Declined NGT decompression  - Gastrograffin challenge 6/21   - Pain control:   > Dilaudid IV 0.5 mg Q4H PRN  - Nausea:   > Zofran IV 4 mg Q6H PRN     DM2, poorly controlled, a1c 11.5%  Hyperglycemia  Glucose ~400 on admit. Pt with multiple risk factors including housing instability and insurance coverage. Was seen by endocrine during prior hospitalizations. No AG, bicarb low, ketones negative so low suspicion for DKA/HHS.  - HDSSI while NPO (hold on basal coverage)  - Hold PTA metformin and Januvia 100 mg   - Hypoglycemia protocol      HIV  Per prior discharge note, :\"- due for ID follow-up 4/2023 (last saw ID 1/2023, did not have insurance so had to defer management until insurance obtained again)\". On Biktarvy. HIV RNA negative and absolute CD4 count 330 as of 5/6/23.  - " "PTA biktarvy  - Discuss ID follow up with [facility]     GERD  Endorsing active symptoms  - cont PTA famotidine as tolerated        Diet: NPO for Medical/Clinical Reasons Except for: No Exceptions    DVT Prophylaxis: Ambulate every shift  Potts Catheter: Not present  Fluids: NS @ 125 ml/hr  Lines: None     Cardiac Monitoring: None  Code Status: Full Code      Clinically Significant Risk Factors Present on Admission                      # DMII: A1C = 11.5 % (Ref range: <5.7 %) within past 6 months    # Overweight: Estimated body mass index is 27.07 kg/m  as calculated from the following:    Height as of this encounter: 1.575 m (5' 2\").    Weight as of this encounter: 67.1 kg (148 lb).            Disposition Plan      Expected Discharge Date: 06/23/2023                The patient's care was discussed with the Attending Physician, Dr. Hoffmann.    Barbi Bowen MD  Medicine Service, [address]  Securely message with Vocera (more info)  Text page via [employer] Paging/Directory   See signed in provider for up to date coverage information  ______________________________________________________________________    Interval History   NAEO. Patient sleeping in bed, not very interactive with medical team. Has some abdominal pain but states it is unchanged from presentation. Denies any nausea/vomiting. Has not yet had bowel movement. Declines to have an NGT placed.     Physical Exam   Vital Signs: Temp: 98  F (36.7  C) Temp src: Oral BP: (!) 139/95 Pulse: 86   Resp: 16 SpO2: 96 % O2 Device: None (Room air)    Weight: 148 lbs 0 oz    Constitutional: eyes remain closed, NAD, resting comfortably in bed  Respiratory: No increased work of breathing, good air exchange, clear to auscultation bilaterally, no crackles or wheezing  Cardiovascular: Normal apical impulse, regular rate and rhythm, normal S1 and S2, no S3 or S4, and no murmur noted  GI: hypoactive bowel sounds, firm " and distended  Skin: no bruising or bleeding  Neurologic: Mental Status Exam:  Orientation:   person, place, time    Medical Decision Making     Please see A&P for additional details of medical decision making.      Data     I have personally reviewed the following data over the past 24 hrs:    7.0  \   12.7 (L)   / 326     137 105 11.8 /  202 (H)   3.9 21 (L) 0.59 (L) \       ALT: 16 AST: 17 AP: 111 TBILI: 0.5   ALB: 4.0 TOT PROTEIN: 7.0 LIPASE: 42       Procal: N/A CRP: N/A Lactic Acid: 1.0

## 2023-06-21 NOTE — ED PROVIDER NOTES
Sign Out Provider: Dr. Brown    Sign Out Plan: 57-year-old male here with hyperglycemia, abdominal pain, CT scan concerning for small bowel obstruction.  Patient pending general surgery evaluation at the time of signout.    Reassessment: General surgery evaluate the patient.  At this time recommend NG tube placement, nonoperative management, admit to medicine for pain control, IV hydration.  Patient refused NG tube in the emergency department.  I discussed patient's management with internal medicine team who agrees with admission.  Patient understands and agrees to the plan.    Disposition: Admit       Sheila Bruno MD  06/21/23 2077

## 2023-06-21 NOTE — PROGRESS NOTES
Surgery brief note     Pt seen this morning, laying in bed, sleeping. No complaints. Passing gas. Denies any abdominal pain. On physical exam he is soft, nontender, nondistended. Pt continues to decline NG tube. We explained that NG is still our recommendation for decompression as the Stomach will continue to make approximately 1L of fluid a day even if he is not drinking. .     Continue recommendations as previously mentioned:      -Recommend NPO, NGT to LIS  -Replete electrolytes K>4, Phos>3, Mag>2  -General surgery will continue to follow
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
## 2023-06-22 NOTE — PROGRESS NOTES
Patient returned from X Ray and was mad that he could not immediately eat. I explained that we had to wait for the provider to read the x ray before I could give him any food. Patient became very angry and started pulling out his IV and saying he was leaving. I paged the [place] [id number] team who came to talk to the patient and explained the risks of injury or death with leaving. Patient stormed out of the room and left AMA, refusing to sign the AMA paperwork.

## 2023-06-22 NOTE — PROGRESS NOTES
Shift: [id number]     VS: stable  Pain: multiple complaints of pain on different parts of the body  Neuro: WDL  Cardiac: WDL  Respiratory: WDL  Diet/Appetite:NPO  GI/: abdominal discomfort; SBO  LDAs: R PIV SL  Skin: wdl  Activity: ambulatory  Test/Procedures: gastrograffin challenge : first xray @ 1930  Labs:     Shift Summary: Pt diagnosed with SBO. gen surgery consulted. Pt declined NG tube. GS ordered gastrograffin challenge. First xray is @ 1930. Pt is NPO. Pt was asleep for most of the day.

## 2023-06-24 NOTE — DISCHARGE SUMMARY
"[facility]  AMA Discharge Summary - Medicine & Pediatrics       Date of Admission:  6/20/2023  Date of Discharge:  6/21/2023  8:18 PM  Discharging Provider: Roosevelt Hoffmann MD  Discharge Service: Medicine Service, JORGE TEAM 5    Discharge Diagnoses   Partial SBO  Recurrent partial SBO  Uncontrolled T2DM (A1c 11.5%)  Pseudohyponatremia 2/2 hyperglycemia  HIV  GERD    Clinically Significant Risk Factors     # DMII: A1C = 11.5 % (Ref range: <5.7 %) within past 6 months  # Overweight: Estimated body mass index is 27.07 kg/m  as calculated from the following:    Height as of this encounter: 1.575 m (5' 2\").    Weight as of this encounter: 67.1 kg (148 lb).       Follow-ups Needed After Discharge       Unresulted Labs Ordered in the Past 30 Days of this Admission     No orders found from 5/21/2023 to 6/21/2023.      These results will be followed up by N/A    Discharge Disposition   Discharged to home  Condition at discharge: Stable    Hospital Course   Andrew Collier was admitted on 6/20/2023. 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      #Partial SBO  #Recurrent partial SBO  Patient presented with acute onset of L sided abdominal pain, nausea, and vomiting since  5pm on the day of admission. Had small BM and has been passing gas. CT showing \"low to intermediate grade mechanical obstruction\". Vitally stable. Pt had three large volume vomitus with significant improvement in pain while in the ED. 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 Patient declined to have NGT decompression. Gastrograffin study performed. Before the study was read " "the patient became frustrated and agitated that he could not eat. Explained to patient that recommendation is to wait until gastrograffin study is read prior to advancing diet to clears. Patient declined to wait and left AMA understanding the risks of worsening bowel obstruction, further bowel complications, and death.                                                                                    #DM2, poorly controlled, a1c 11.5%  #Hyperglycemia  Glucose ~400 on admit. Pt with multiple risk factors including housing instability and insurance coverage. Was seen by endocrine during prior hospitalizations. No AG, bicarb low, will rule out HHS. Employed hypoglycemia protocol and sliding scale insulin while patient NPO.     #Pseudohyonatremia 2/2 hyperglycemia  Na 134 on admit, but normal range after hyperglycemia correction    #HIV  Per prior discharge note, :\"- due for ID follow-up 4/2023 (last saw ID 1/2023, did not have insurance so had to defer management until insurance obtained again)\". Negative HIV DNA and absolute CD4 count 330 5/6/23.     #GERD  Endorsing active symptoms  - cont PTA famotidine as tolerated    Consultations This Hospital Stay   None    Code Status   Prior       The patient was discussed with Dr. Hoffmann.    Barbi Bowen MD  [address] EMERGENCY DEPARTMENT  [address] [facility] [address]  Phone: [phone]  ______________________________________________________________________    Physical Exam   Vital Signs:                   Weight: 148 lbs 0 oz  Constitutional: awake, alert, frustrated affect  Eyes: EOMI, sclera anicteric  Respiratory: regular rate and effort, patient declined stethoscope exam, no accessory muscle use  Cardiovascular: patient declined stethoscope exam  GI: patient declined exam  Skin: no rashes  Neurologic: oriented x 3      Primary Care Physician   Chevy Reynoso    Discharge Orders   No discharge procedures on " file.    Significant Results and Procedures   Results for orders placed or performed during the hospital encounter of 06/20/23   CT Abdomen Pelvis w/o Contrast    Narrative    EXAM: CT ABDOMEN PELVIS W/O CONTRAST  LOCATION: [facility]  DATE: 6/21/2023    INDICATION: abd pain, hx SBO  COMPARISON: 05/06/2023  TECHNIQUE: CT scan of the abdomen and pelvis was performed without IV contrast. Multiplanar reformats were obtained. Dose reduction techniques were used.  CONTRAST: None.    FINDINGS:   LOWER CHEST: Normal.    HEPATOBILIARY: No significant mass or bile duct dilatation. No calcified gallstones.     PANCREAS: No significant mass, duct dilatation, or inflammatory change.    SPLEEN: Normal size.    ADRENAL GLANDS: No significant nodules.    KIDNEYS/BLADDER: No significant mass, stone, or hydronephrosis.    BOWEL: A few dilated loops of mid jejunum are seen in the left midabdomen, with a apparent transition point seen mid abdomen (image 38, series 2). The bowel distal to this point is decompressed and normal in size and caliber. There is no evidence of   pneumatosis or bowel perforation. The large bowel is unremarkable. (    LYMPH NODES: No lymphadenopathy.    VASCULATURE: No abdominal aortic aneurysm.    PELVIC ORGANS: A penile prosthesis and reservoir are again noted with reservoir again seen anterior the bladder, unchanged from prior exam.    MUSCULOSKELETAL: Normal.      Impression    IMPRESSION:   1.  A few dilated loops of mid jejunum seen in the left mid abdomen with an apparent transition point seen in the mid abdomen (image 38, series 2) consistent with a low to intermediate grade mechanical obstruction, when compared with 05/06/2023, the   findings are not as pronounced as on the prior exam.       XR Gastrografin Challenge    Narrative    EXAMINATION:  XR GASTROGRAFIN CHALLENGE 6/21/2023     COMPARISON: CT abdomen and pelvis same day    HISTORY: Small Bowel  Obstruction.    TECHNIQUE: Two frontal supine views of the abdomen.    FINDINGS: Gastrografin is seen filling the colon and extending to the  rectum. A dilated air-filled segment of small bowel is seen at the  left upper quadrant. The lung bases are unremarkable.       Impression    IMPRESSION:   Oral contrast passes through the small bowel and reaches the colon and  rectum.    I have personally reviewed the examination and initial interpretation  and I agree with the findings.    JANIE VANN MD         SYSTEM ID:  [id number]     *Note: Due to a large number of results and/or encounters for the requested time period, some results have not been displayed. A complete set of results can be found in Results Review.       Discharge Medications   Discharge Medication List as of 6/21/2023  8:19 PM      CONTINUE these medications which have NOT CHANGED    Details   acetaminophen (TYLENOL) 500 MG tablet Take 1-2 tablets (500-1,000 mg) by mouth every 6 hours as needed for mild pain, Disp-40 tablet, R-0, Local Print      bictegravir-emtricitabine-tenofovir (BIKTARVY) -25 MG per tablet Take 1 tablet by mouth daily, Disp-30 tablet, R-2, E-Prescribe      blood glucose (NO BRAND SPECIFIED) lancets standard Use to test blood sugar 1 time daily or as directed.Disp-100 lancet, [id number]      blood glucose (NO BRAND SPECIFIED) test strip Use to test blood sugar 1 time daily or as directed., Disp-100 strip, R-4, E-Prescribe      blood glucose monitoring (NO BRAND SPECIFIED) meter device kit Use to test blood sugar 1 time daily or as directed.Disp-1 kit, [id number]      buPROPion (WELLBUTRIN XL) 150 MG 24 hr tablet Take 1 tablet (150 mg) by mouth daily, Disp-60 tablet, R-0, E-Prescribe      famotidine (PEPCID) 40 MG tablet Take 1 tablet (40 mg) by mouth daily, Disp-60 tablet, R-1, E-Prescribe      glimepiride (AMARYL) 4 MG tablet Take 1 tablet (4 mg) by mouth 2 times daily (before meals), Disp-90 tablet, R-1, E-Prescribe  "     !! ibuprofen (ADVIL/MOTRIN) 600 MG tablet Take 1 tablet (600 mg) by mouth every 6 hours as needed for moderate pain, Disp-20 tablet, R-0, Local Print      !! ibuprofen (ADVIL/MOTRIN) 600 MG tablet Take 1 tablet (600 mg) by mouth every 6 hours as needed for moderate pain (4-6), Disp-90 tablet, R-0, E-Prescribe      levofloxacin (LEVAQUIN) 500 MG tablet Take 1 tablet (500 mg) by mouth daily, Disp-3 tablet, R-0, E-Prescribe      metFORMIN (FORTAMET) 1000 MG 24 hr tablet Take 2 tablets (2,000 mg) by mouth daily (with dinner), Disp-120 tablet, R-0, E-Prescribe      sitagliptin (JANUVIA) 100 MG tablet Take 1 tablet (100 mg) by mouth daily, Disp-60 tablet, R-0, E-Prescribe      sulfamethoxazole-trimethoprim (BACTRIM DS) 800-160 MG tablet Take 1 tablet by mouth 2 times daily, Disp-7 tablet, R-0, E-Prescribe       !! - Potential duplicate medications found. Please discuss with provider.        Allergies   Allergies   Allergen Reactions     Fentanyl Blisters     Per pt, after taking this medication had blisters develope     Tylenol [Acetaminophen] Itching     Dulaglutide Rash     Insulin Lispro Rash     Patient reported     Penicillin V Other (See Comments) and Rash     Diffuse maculopapular rash + feels \"high\", per pt.      "

## 2023-06-28 ENCOUNTER — TELEPHONE (OUTPATIENT)
Dept: INFECTIOUS DISEASES | Facility: CLINIC | Age: 58
End: 2023-06-28

## 2023-06-28 ENCOUNTER — HOSPITAL ENCOUNTER (EMERGENCY)
Facility: CLINIC | Age: 58
Discharge: LEFT BEFORE DISCHARGE | End: 2023-06-28
Attending: EMERGENCY MEDICINE | Admitting: EMERGENCY MEDICINE
Payer: COMMERCIAL

## 2023-06-28 VITALS
SYSTOLIC BLOOD PRESSURE: 140 MMHG | HEART RATE: 98 BPM | DIASTOLIC BLOOD PRESSURE: 82 MMHG | TEMPERATURE: 98.2 F | OXYGEN SATURATION: 99 % | RESPIRATION RATE: 18 BRPM

## 2023-06-28 DIAGNOSIS — M25.511 RIGHT SHOULDER PAIN, UNSPECIFIED CHRONICITY: ICD-10-CM

## 2023-06-28 DIAGNOSIS — R73.9 HYPERGLYCEMIA: ICD-10-CM

## 2023-06-28 DIAGNOSIS — Z86.39 HISTORY OF DIABETES MELLITUS: ICD-10-CM

## 2023-06-28 LAB
ALBUMIN SERPL BCG-MCNC: 4.2 G/DL (ref 3.5–5.2)
ALBUMIN UR-MCNC: NEGATIVE MG/DL
ALP SERPL-CCNC: 145 U/L (ref 40–129)
ALT SERPL W P-5'-P-CCNC: 12 U/L (ref 0–70)
ANION GAP SERPL CALCULATED.3IONS-SCNC: 12 MMOL/L (ref 7–15)
APPEARANCE UR: CLEAR
AST SERPL W P-5'-P-CCNC: 11 U/L (ref 0–45)
B-OH-BUTYR SERPL-SCNC: <0.18 MMOL/L
BASE EXCESS BLDV CALC-SCNC: -0.2 MMOL/L (ref -7.7–1.9)
BASOPHILS # BLD AUTO: 0.1 10E3/UL (ref 0–0.2)
BASOPHILS NFR BLD AUTO: 1 %
BILIRUB SERPL-MCNC: 0.3 MG/DL
BILIRUB UR QL STRIP: NEGATIVE
BUN SERPL-MCNC: 23.3 MG/DL (ref 8–23)
CALCIUM SERPL-MCNC: 9.9 MG/DL (ref 8.6–10)
CHLORIDE SERPL-SCNC: 96 MMOL/L (ref 98–107)
COLOR UR AUTO: ABNORMAL
CREAT SERPL-MCNC: 0.87 MG/DL (ref 0.67–1.17)
DEPRECATED HCO3 PLAS-SCNC: 24 MMOL/L (ref 22–29)
EOSINOPHIL # BLD AUTO: 0.1 10E3/UL (ref 0–0.7)
EOSINOPHIL NFR BLD AUTO: 2 %
ERYTHROCYTE [DISTWIDTH] IN BLOOD BY AUTOMATED COUNT: 14.2 % (ref 10–15)
GFR SERPL CREATININE-BSD FRML MDRD: >90 ML/MIN/1.73M2
GLUCOSE BLDC GLUCOMTR-MCNC: 414 MG/DL (ref 70–99)
GLUCOSE BLDC GLUCOMTR-MCNC: 478 MG/DL (ref 70–99)
GLUCOSE BLDC GLUCOMTR-MCNC: 528 MG/DL (ref 70–99)
GLUCOSE BLDC GLUCOMTR-MCNC: 578 MG/DL (ref 70–99)
GLUCOSE BLDC GLUCOMTR-MCNC: >600 MG/DL (ref 70–99)
GLUCOSE SERPL-MCNC: 664 MG/DL (ref 70–99)
GLUCOSE UR STRIP-MCNC: >=1000 MG/DL
HCO3 BLDV-SCNC: 27 MMOL/L (ref 21–28)
HCT VFR BLD AUTO: 43.5 % (ref 40–53)
HGB BLD-MCNC: 13.7 G/DL (ref 13.3–17.7)
HGB UR QL STRIP: NEGATIVE
HOLD SPECIMEN: NORMAL
HOLD SPECIMEN: NORMAL
IMM GRANULOCYTES # BLD: 0.1 10E3/UL
IMM GRANULOCYTES NFR BLD: 2 %
KETONES UR STRIP-MCNC: NEGATIVE MG/DL
LEUKOCYTE ESTERASE UR QL STRIP: NEGATIVE
LYMPHOCYTES # BLD AUTO: 1.9 10E3/UL (ref 0.8–5.3)
LYMPHOCYTES NFR BLD AUTO: 31 %
MCH RBC QN AUTO: 27.5 PG (ref 26.5–33)
MCHC RBC AUTO-ENTMCNC: 31.5 G/DL (ref 31.5–36.5)
MCV RBC AUTO: 87 FL (ref 78–100)
MONOCYTES # BLD AUTO: 0.4 10E3/UL (ref 0–1.3)
MONOCYTES NFR BLD AUTO: 7 %
MUCOUS THREADS #/AREA URNS LPF: PRESENT /LPF
NEUTROPHILS # BLD AUTO: 3.5 10E3/UL (ref 1.6–8.3)
NEUTROPHILS NFR BLD AUTO: 57 %
NITRATE UR QL: NEGATIVE
NRBC # BLD AUTO: 0 10E3/UL
NRBC BLD AUTO-RTO: 0 /100
O2/TOTAL GAS SETTING VFR VENT: 28 %
PCO2 BLDV: 54 MM HG (ref 40–50)
PH BLDV: 7.31 [PH] (ref 7.32–7.43)
PH UR STRIP: 5 [PH] (ref 5–7)
PLATELET # BLD AUTO: 358 10E3/UL (ref 150–450)
PO2 BLDV: 27 MM HG (ref 25–47)
POTASSIUM SERPL-SCNC: 4.8 MMOL/L (ref 3.4–5.3)
PROT SERPL-MCNC: 7.3 G/DL (ref 6.4–8.3)
RBC # BLD AUTO: 4.98 10E6/UL (ref 4.4–5.9)
RBC URINE: 4 /HPF
SODIUM SERPL-SCNC: 132 MMOL/L (ref 136–145)
SP GR UR STRIP: 1.03 (ref 1–1.03)
UROBILINOGEN UR STRIP-MCNC: NORMAL MG/DL
WBC # BLD AUTO: 6.1 10E3/UL (ref 4–11)
WBC URINE: 3 /HPF

## 2023-06-28 PROCEDURE — 96372 THER/PROPH/DIAG INJ SC/IM: CPT | Mod: XS | Performed by: EMERGENCY MEDICINE

## 2023-06-28 PROCEDURE — 82803 BLOOD GASES ANY COMBINATION: CPT | Performed by: EMERGENCY MEDICINE

## 2023-06-28 PROCEDURE — 81001 URINALYSIS AUTO W/SCOPE: CPT | Performed by: EMERGENCY MEDICINE

## 2023-06-28 PROCEDURE — 250N000013 HC RX MED GY IP 250 OP 250 PS 637: Performed by: EMERGENCY MEDICINE

## 2023-06-28 PROCEDURE — 96361 HYDRATE IV INFUSION ADD-ON: CPT | Performed by: EMERGENCY MEDICINE

## 2023-06-28 PROCEDURE — 96375 TX/PRO/DX INJ NEW DRUG ADDON: CPT | Performed by: EMERGENCY MEDICINE

## 2023-06-28 PROCEDURE — 99284 EMERGENCY DEPT VISIT MOD MDM: CPT | Mod: 25 | Performed by: EMERGENCY MEDICINE

## 2023-06-28 PROCEDURE — 96374 THER/PROPH/DIAG INJ IV PUSH: CPT | Performed by: EMERGENCY MEDICINE

## 2023-06-28 PROCEDURE — 36415 COLL VENOUS BLD VENIPUNCTURE: CPT | Performed by: EMERGENCY MEDICINE

## 2023-06-28 PROCEDURE — 80053 COMPREHEN METABOLIC PANEL: CPT | Performed by: EMERGENCY MEDICINE

## 2023-06-28 PROCEDURE — 250N000012 HC RX MED GY IP 250 OP 636 PS 637: Performed by: EMERGENCY MEDICINE

## 2023-06-28 PROCEDURE — 258N000003 HC RX IP 258 OP 636: Performed by: EMERGENCY MEDICINE

## 2023-06-28 PROCEDURE — 82962 GLUCOSE BLOOD TEST: CPT

## 2023-06-28 PROCEDURE — 99285 EMERGENCY DEPT VISIT HI MDM: CPT | Performed by: EMERGENCY MEDICINE

## 2023-06-28 PROCEDURE — 85025 COMPLETE CBC W/AUTO DIFF WBC: CPT | Performed by: EMERGENCY MEDICINE

## 2023-06-28 PROCEDURE — 250N000011 HC RX IP 250 OP 636: Mod: JZ | Performed by: EMERGENCY MEDICINE

## 2023-06-28 PROCEDURE — 82010 KETONE BODYS QUAN: CPT | Performed by: EMERGENCY MEDICINE

## 2023-06-28 RX ORDER — KETOROLAC TROMETHAMINE 15 MG/ML
15 INJECTION, SOLUTION INTRAMUSCULAR; INTRAVENOUS ONCE
Status: COMPLETED | OUTPATIENT
Start: 2023-06-28 | End: 2023-06-28

## 2023-06-28 RX ORDER — MAGNESIUM HYDROXIDE/ALUMINUM HYDROXICE/SIMETHICONE 120; 1200; 1200 MG/30ML; MG/30ML; MG/30ML
30 SUSPENSION ORAL ONCE
Status: COMPLETED | OUTPATIENT
Start: 2023-06-28 | End: 2023-06-28

## 2023-06-28 RX ORDER — LIDOCAINE 4 G/G
2 PATCH TOPICAL
Status: DISCONTINUED | OUTPATIENT
Start: 2023-06-28 | End: 2023-06-28 | Stop reason: HOSPADM

## 2023-06-28 RX ORDER — NICOTINE POLACRILEX 4 MG
15-30 LOZENGE BUCCAL
Status: DISCONTINUED | OUTPATIENT
Start: 2023-06-28 | End: 2023-06-28 | Stop reason: HOSPADM

## 2023-06-28 RX ORDER — CYCLOBENZAPRINE HCL 5 MG
5 TABLET ORAL ONCE
Status: COMPLETED | OUTPATIENT
Start: 2023-06-28 | End: 2023-06-28

## 2023-06-28 RX ORDER — MORPHINE SULFATE 4 MG/ML
4 INJECTION, SOLUTION INTRAMUSCULAR; INTRAVENOUS ONCE
Status: COMPLETED | OUTPATIENT
Start: 2023-06-28 | End: 2023-06-28

## 2023-06-28 RX ORDER — DEXTROSE MONOHYDRATE 25 G/50ML
25-50 INJECTION, SOLUTION INTRAVENOUS
Status: DISCONTINUED | OUTPATIENT
Start: 2023-06-28 | End: 2023-06-28 | Stop reason: HOSPADM

## 2023-06-28 RX ORDER — METFORMIN HYDROCHLORIDE EXTENDED-RELEASE TABLETS 1000 MG/1
1000 TABLET, FILM COATED, EXTENDED RELEASE ORAL
Qty: 30 TABLET | Refills: 0 | Status: ON HOLD | OUTPATIENT
Start: 2023-06-28 | End: 2023-08-16

## 2023-06-28 RX ADMIN — CYCLOBENZAPRINE HYDROCHLORIDE 5 MG: 5 TABLET, FILM COATED ORAL at 12:57

## 2023-06-28 RX ADMIN — ALUMINUM HYDROXIDE, MAGNESIUM HYDROXIDE, AND SIMETHICONE 30 ML: 200; 200; 20 SUSPENSION ORAL at 18:43

## 2023-06-28 RX ADMIN — KETOROLAC TROMETHAMINE 15 MG: 15 INJECTION, SOLUTION INTRAMUSCULAR; INTRAVENOUS at 16:34

## 2023-06-28 RX ADMIN — SODIUM CHLORIDE, POTASSIUM CHLORIDE, SODIUM LACTATE AND CALCIUM CHLORIDE 1000 ML: 600; 310; 30; 20 INJECTION, SOLUTION INTRAVENOUS at 17:41

## 2023-06-28 RX ADMIN — KETOROLAC TROMETHAMINE 15 MG: 15 INJECTION, SOLUTION INTRAMUSCULAR; INTRAVENOUS at 12:57

## 2023-06-28 RX ADMIN — MORPHINE SULFATE 4 MG: 4 INJECTION INTRAVENOUS at 16:34

## 2023-06-28 RX ADMIN — SODIUM CHLORIDE, POTASSIUM CHLORIDE, SODIUM LACTATE AND CALCIUM CHLORIDE 1000 ML: 600; 310; 30; 20 INJECTION, SOLUTION INTRAVENOUS at 16:43

## 2023-06-28 RX ADMIN — INSULIN ASPART 10 UNITS: 100 INJECTION, SOLUTION INTRAVENOUS; SUBCUTANEOUS at 16:32

## 2023-06-28 RX ADMIN — LIDOCAINE PATCH 4% 2 PATCH: 40 PATCH TOPICAL at 12:57

## 2023-06-28 ASSESSMENT — ACTIVITIES OF DAILY LIVING (ADL)
ADLS_ACUITY_SCORE: 37
ADLS_ACUITY_SCORE: 35
ADLS_ACUITY_SCORE: 37
ADLS_ACUITY_SCORE: 37
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
## 2023-06-28 NOTE — DISCHARGE INSTRUCTIONS
You can take 500mg naproxen (aleve) every 12 hours for pain and use flexeril as needed.    2.  Please go to your primary clinic tomorrow to have your insulin prescribed.  After calling the pharmacies and looking at your chart it is unclear what the proper dose of long acting and short acting insulin is.      3. You are always welcome in the Emergency Dept.  Tomorrow: chris can walk into the [facility] (ID clinic -6th Floor PWB) between the hours of 8 am and 4 pm Monday through Friday for NON EMERGENT concerns.  They will see him without an appointment.  For questions or concerns please call the clinic directly or contact DAYNA Contreras at the clinic (M-F 8-4 at pager [phone])    4.  Metformin is prescribed to you at the [facility] ([facility]) pharmacy please pick this up tomorrow and start taking this every evening as prescribed

## 2023-06-28 NOTE — TELEPHONE ENCOUNTER
Patient walked into clinic looking for endo medications as he went to ED for GLUCOSE over 700. Patient did not wait. Patient was upset that SW that he has worked with in the past is no longer at the clinic. Patient was informed that he needs to talk to pharmacy for medications and if they need refills of insulin then they will contact ENDO. If he needs HIV medication the pharmacy has that prescription from visit in Jan.   Patient does seem as if there is some incoherence issues due to elevated glucose and patient was encouraged to go back to ED to be treated. Patient did not want to go back to ED.      Patient got upset and walked out of room RN tried to follow although was unable to find patient.    Discussed with Pharmacy that he does have Biktarvy and they will refill.     John Bassett RN  Infectious Disease on 6/28/2023 at 2:00 PM

## 2023-06-28 NOTE — ED TRIAGE NOTES
Pt ambulatory to triage with c/o of R shoulder pain. 10/10 pain in shoulder. Recent ER visit for same c/o- xray's said it was bruised. Continues to have pain and cant sleep or do anything because of the pain.      Triage Assessment     Row Name 06/28/23 1146       Triage Assessment (Adult)    Airway WDL WDL       Respiratory WDL    Respiratory WDL WDL       Skin Circulation/Temperature WDL    Skin Circulation/Temperature WDL WDL       Cardiac WDL    Cardiac WDL WDL       Peripheral/Neurovascular WDL    Peripheral Neurovascular WDL WDL       Cognitive/Neuro/Behavioral WDL    Cognitive/Neuro/Behavioral WDL WDL

## 2023-06-29 LAB — GLUCOSE BLDC GLUCOMTR-MCNC: 551 MG/DL (ref 70–99)

## 2023-06-29 NOTE — PROGRESS NOTES
Care Management Follow Up    Length of Stay (days): 0    Expected Discharge Date:  6/28/2023     Concerns to be Addressed:  PCP next day appointment set  up     Patient plan of care discussed at interdisciplinary rounds: No    Anticipated Discharge Disposition:  Home     Anticipated Discharge Services:    Anticipated Discharge DME:  None    Patient/family educated on Medicare website which has current facility and service quality ratings:  N/A  Education Provided on the Discharge Plan:  N/A  Patient/Family in Agreement with the Plan:  N/A    Referrals Placed by CM/SW:  None  Private pay costs discussed: Not applicable    Additional Information:  2142  DAYNA received page from Medical student Saige asking for SW to help set up next day PCP appointment for pt. Pt has poor control of his diabetes and is not able to give good information from his past medical appointments. SW to call Central Scheduling (P:  [phone]) and set up next day appointment with PCP or another PCP in clinic is fine.     SW call Central Scheduling (P:  [phone]) and spoke , Melissa who looked up pt and report pt does not have a PCP. The one listed in chart is no longer listed at the clinic. SW ask to set up appointment with any PCP at available at the [facility] clinic. Melissa confirmed because [facility] is a specialty clinic SW will need to call the clinic directly in order to schedule appointment. SW was transfer to [facility] clinic and they are closed as, it is after hours.     SW tracked pt's information in PCP tracking form and listed as unsuccessful.     SW update medical student, Saige.      available and will continue to follow for discharge planning and supports as needed.   _______________________    FLORENCE Hope, LSW  ED/OBS   [facility]  Phone: [phone]  Pager: [phone]  Fax: [phone]     On-call pager, [phone], 4:00 pm to midnight

## 2023-07-21 NOTE — ED AVS SNAPSHOT
[facility], [place], Emergency Department  [address] [place] [id number]  Phone:  [phone]                                    Andrew Lockwood   MRN: [id number]    Department:  [facility], Emergency Department   Date of Visit:  10/29/2019           After Visit Summary Signature Page    I have received my discharge instructions, and my questions have been answered. I have discussed any challenges I see with this plan with the nurse or doctor.    ..........................................................................................................................................  Patient/Patient Representative Signature      ..........................................................................................................................................  Patient Representative Print Name and Relationship to Patient    ..................................................               ................................................  Date                                   Time    ..........................................................................................................................................  Reviewed by Signature/Title    ...................................................              ..............................................  Date                                               Time          22EPIC Rev 08/18        Yes

## 2023-08-06 ENCOUNTER — APPOINTMENT (OUTPATIENT)
Dept: CT IMAGING | Facility: CLINIC | Age: 58
End: 2023-08-06
Attending: EMERGENCY MEDICINE
Payer: COMMERCIAL

## 2023-08-06 ENCOUNTER — HOSPITAL ENCOUNTER (INPATIENT)
Facility: CLINIC | Age: 58
LOS: 9 days | Discharge: NURSING, REHAB OR LONG-TERM CARE | End: 2023-08-16
Attending: EMERGENCY MEDICINE | Admitting: INTERNAL MEDICINE
Payer: COMMERCIAL

## 2023-08-06 DIAGNOSIS — Z21 HUMAN IMMUNODEFICIENCY VIRUS I INFECTION (H): ICD-10-CM

## 2023-08-06 DIAGNOSIS — R73.9 HYPERGLYCEMIA: ICD-10-CM

## 2023-08-06 DIAGNOSIS — B20 HUMAN IMMUNODEFICIENCY VIRUS (HIV) DISEASE (H): Primary | ICD-10-CM

## 2023-08-06 DIAGNOSIS — E11.65 TYPE 2 DIABETES MELLITUS WITH HYPERGLYCEMIA, WITH LONG-TERM CURRENT USE OF INSULIN (H): ICD-10-CM

## 2023-08-06 DIAGNOSIS — Z79.4 TYPE 2 DIABETES MELLITUS WITH HYPERGLYCEMIA, WITH LONG-TERM CURRENT USE OF INSULIN (H): ICD-10-CM

## 2023-08-06 DIAGNOSIS — R10.9 ABDOMINAL PAIN, UNSPECIFIED ABDOMINAL LOCATION: ICD-10-CM

## 2023-08-06 DIAGNOSIS — E11.9 TYPE 2 DIABETES MELLITUS WITHOUT COMPLICATION, WITHOUT LONG-TERM CURRENT USE OF INSULIN (H): ICD-10-CM

## 2023-08-06 DIAGNOSIS — R11.2 NAUSEA AND VOMITING, UNSPECIFIED VOMITING TYPE: ICD-10-CM

## 2023-08-06 DIAGNOSIS — F15.10 METHAMPHETAMINE ABUSE (H): ICD-10-CM

## 2023-08-06 DIAGNOSIS — K21.9 GASTROESOPHAGEAL REFLUX DISEASE, UNSPECIFIED WHETHER ESOPHAGITIS PRESENT: ICD-10-CM

## 2023-08-06 LAB
ALBUMIN SERPL BCG-MCNC: 4.5 G/DL (ref 3.5–5.2)
ALP SERPL-CCNC: 192 U/L (ref 40–129)
ALT SERPL W P-5'-P-CCNC: 14 U/L (ref 0–70)
ANION GAP SERPL CALCULATED.3IONS-SCNC: 15 MMOL/L (ref 7–15)
AST SERPL W P-5'-P-CCNC: 27 U/L (ref 0–45)
B-OH-BUTYR SERPL-SCNC: 0.7 MMOL/L
BASOPHILS # BLD AUTO: 0 10E3/UL (ref 0–0.2)
BASOPHILS NFR BLD AUTO: 0 %
BILIRUB SERPL-MCNC: 0.5 MG/DL
BUN SERPL-MCNC: 15.4 MG/DL (ref 8–23)
CA-I BLD-MCNC: 3.7 MG/DL (ref 4.4–5.2)
CALCIUM SERPL-MCNC: 10.2 MG/DL (ref 8.6–10)
CHLORIDE SERPL-SCNC: 97 MMOL/L (ref 98–107)
CPB POCT: NO
CREAT SERPL-MCNC: 0.58 MG/DL (ref 0.67–1.17)
DEPRECATED HCO3 PLAS-SCNC: 23 MMOL/L (ref 22–29)
EOSINOPHIL # BLD AUTO: 0.1 10E3/UL (ref 0–0.7)
EOSINOPHIL NFR BLD AUTO: 1 %
ERYTHROCYTE [DISTWIDTH] IN BLOOD BY AUTOMATED COUNT: 13.3 % (ref 10–15)
GFR SERPL CREATININE-BSD FRML MDRD: >90 ML/MIN/1.73M2
GLUCOSE BLD-MCNC: 263 MG/DL (ref 70–99)
GLUCOSE BLDC GLUCOMTR-MCNC: 445 MG/DL (ref 70–99)
GLUCOSE SERPL-MCNC: 442 MG/DL (ref 70–99)
HCO3 BLDV-SCNC: 13 MMOL/L (ref 21–28)
HCT VFR BLD AUTO: 42.8 % (ref 40–53)
HCT VFR BLD CALC: 28 % (ref 40–53)
HGB BLD-MCNC: 14.2 G/DL (ref 13.3–17.7)
HGB BLD-MCNC: 9.5 G/DL (ref 13.3–17.7)
IMM GRANULOCYTES # BLD: 0.1 10E3/UL
IMM GRANULOCYTES NFR BLD: 1 %
INR PPP: 0.91 (ref 0.85–1.15)
LACTATE SERPL-SCNC: 2.6 MMOL/L (ref 0.7–2)
LIPASE SERPL-CCNC: 44 U/L (ref 13–60)
LYMPHOCYTES # BLD AUTO: 1.6 10E3/UL (ref 0.8–5.3)
LYMPHOCYTES NFR BLD AUTO: 21 %
MAGNESIUM SERPL-MCNC: 2.4 MG/DL (ref 1.7–2.3)
MCH RBC QN AUTO: 27.9 PG (ref 26.5–33)
MCHC RBC AUTO-ENTMCNC: 33.2 G/DL (ref 31.5–36.5)
MCV RBC AUTO: 84 FL (ref 78–100)
MONOCYTES # BLD AUTO: 0.5 10E3/UL (ref 0–1.3)
MONOCYTES NFR BLD AUTO: 6 %
NEUTROPHILS # BLD AUTO: 5.5 10E3/UL (ref 1.6–8.3)
NEUTROPHILS NFR BLD AUTO: 71 %
NRBC # BLD AUTO: 0 10E3/UL
NRBC BLD AUTO-RTO: 0 /100
PCO2 BLDV: 20 MM HG (ref 40–50)
PH BLDV: 7.42 [PH] (ref 7.32–7.43)
PHOSPHATE SERPL-MCNC: 2.1 MG/DL (ref 2.5–4.5)
PLATELET # BLD AUTO: 340 10E3/UL (ref 150–450)
PO2 BLDV: 32 MM HG (ref 25–47)
POTASSIUM BLD-SCNC: 2.2 MMOL/L (ref 3.4–5.3)
POTASSIUM SERPL-SCNC: 3.9 MMOL/L (ref 3.4–5.3)
PROT SERPL-MCNC: 8 G/DL (ref 6.4–8.3)
RBC # BLD AUTO: 5.09 10E6/UL (ref 4.4–5.9)
SAO2 % BLDV: 65 % (ref 94–100)
SODIUM BLD-SCNC: 147 MMOL/L (ref 133–144)
SODIUM SERPL-SCNC: 135 MMOL/L (ref 136–145)
WBC # BLD AUTO: 7.7 10E3/UL (ref 4–11)

## 2023-08-06 PROCEDURE — 82962 GLUCOSE BLOOD TEST: CPT

## 2023-08-06 PROCEDURE — 36415 COLL VENOUS BLD VENIPUNCTURE: CPT | Performed by: EMERGENCY MEDICINE

## 2023-08-06 PROCEDURE — 80053 COMPREHEN METABOLIC PANEL: CPT | Performed by: EMERGENCY MEDICINE

## 2023-08-06 PROCEDURE — 250N000011 HC RX IP 250 OP 636: Performed by: EMERGENCY MEDICINE

## 2023-08-06 PROCEDURE — 74177 CT ABD & PELVIS W/CONTRAST: CPT | Mod: 26 | Performed by: RADIOLOGY

## 2023-08-06 PROCEDURE — 82947 ASSAY GLUCOSE BLOOD QUANT: CPT

## 2023-08-06 PROCEDURE — 83605 ASSAY OF LACTIC ACID: CPT | Performed by: EMERGENCY MEDICINE

## 2023-08-06 PROCEDURE — 99285 EMERGENCY DEPT VISIT HI MDM: CPT | Mod: 25 | Performed by: EMERGENCY MEDICINE

## 2023-08-06 PROCEDURE — 83690 ASSAY OF LIPASE: CPT | Performed by: EMERGENCY MEDICINE

## 2023-08-06 PROCEDURE — 96374 THER/PROPH/DIAG INJ IV PUSH: CPT | Performed by: EMERGENCY MEDICINE

## 2023-08-06 PROCEDURE — 82330 ASSAY OF CALCIUM: CPT

## 2023-08-06 PROCEDURE — 99285 EMERGENCY DEPT VISIT HI MDM: CPT | Performed by: EMERGENCY MEDICINE

## 2023-08-06 PROCEDURE — 85610 PROTHROMBIN TIME: CPT | Performed by: EMERGENCY MEDICINE

## 2023-08-06 PROCEDURE — 258N000003 HC RX IP 258 OP 636: Performed by: EMERGENCY MEDICINE

## 2023-08-06 PROCEDURE — 96361 HYDRATE IV INFUSION ADD-ON: CPT | Performed by: EMERGENCY MEDICINE

## 2023-08-06 PROCEDURE — 85025 COMPLETE CBC W/AUTO DIFF WBC: CPT | Performed by: EMERGENCY MEDICINE

## 2023-08-06 PROCEDURE — 83036 HEMOGLOBIN GLYCOSYLATED A1C: CPT

## 2023-08-06 PROCEDURE — 82010 KETONE BODYS QUAN: CPT | Performed by: EMERGENCY MEDICINE

## 2023-08-06 PROCEDURE — 999N000248 HC STATISTIC IV INSERT WITH US BY RN

## 2023-08-06 PROCEDURE — 83735 ASSAY OF MAGNESIUM: CPT | Performed by: EMERGENCY MEDICINE

## 2023-08-06 PROCEDURE — 74177 CT ABD & PELVIS W/CONTRAST: CPT

## 2023-08-06 PROCEDURE — 84100 ASSAY OF PHOSPHORUS: CPT | Performed by: EMERGENCY MEDICINE

## 2023-08-06 RX ORDER — HYDROMORPHONE HYDROCHLORIDE 1 MG/ML
0.5 INJECTION, SOLUTION INTRAMUSCULAR; INTRAVENOUS; SUBCUTANEOUS ONCE
Status: COMPLETED | OUTPATIENT
Start: 2023-08-06 | End: 2023-08-06

## 2023-08-06 RX ORDER — IOPAMIDOL 755 MG/ML
92 INJECTION, SOLUTION INTRAVASCULAR ONCE
Status: COMPLETED | OUTPATIENT
Start: 2023-08-06 | End: 2023-08-07

## 2023-08-06 RX ADMIN — HYDROMORPHONE HYDROCHLORIDE 0.5 MG: 1 INJECTION, SOLUTION INTRAMUSCULAR; INTRAVENOUS; SUBCUTANEOUS at 23:28

## 2023-08-06 RX ADMIN — SODIUM CHLORIDE, POTASSIUM CHLORIDE, SODIUM LACTATE AND CALCIUM CHLORIDE 500 ML: 600; 310; 30; 20 INJECTION, SOLUTION INTRAVENOUS at 23:29

## 2023-08-06 ASSESSMENT — ACTIVITIES OF DAILY LIVING (ADL): ADLS_ACUITY_SCORE: 37

## 2023-08-07 ENCOUNTER — APPOINTMENT (OUTPATIENT)
Dept: GENERAL RADIOLOGY | Facility: CLINIC | Age: 58
End: 2023-08-07
Payer: COMMERCIAL

## 2023-08-07 ENCOUNTER — APPOINTMENT (OUTPATIENT)
Dept: GENERAL RADIOLOGY | Facility: CLINIC | Age: 58
End: 2023-08-07
Attending: EMERGENCY MEDICINE
Payer: COMMERCIAL

## 2023-08-07 PROBLEM — R10.9 ABDOMINAL PAIN, UNSPECIFIED ABDOMINAL LOCATION: Status: ACTIVE | Noted: 2023-08-07

## 2023-08-07 LAB
ALBUMIN UR-MCNC: 10 MG/DL
ANION GAP SERPL CALCULATED.3IONS-SCNC: 11 MMOL/L (ref 7–15)
APPEARANCE UR: CLEAR
BASE EXCESS BLDV CALC-SCNC: -0.4 MMOL/L (ref -7.7–1.9)
BILIRUB UR QL STRIP: NEGATIVE
BUN SERPL-MCNC: 12.8 MG/DL (ref 8–23)
CALCIUM SERPL-MCNC: 7.7 MG/DL (ref 8.6–10)
CD3 CELLS # BLD: 1629 CELLS/UL (ref 603–2990)
CD3 CELLS NFR BLD: 73 % (ref 49–84)
CD3+CD4+ CELLS # BLD: 525 CELLS/UL (ref 441–2156)
CD3+CD4+ CELLS NFR BLD: 24 % (ref 28–63)
CD3+CD4+ CELLS/CD3+CD8+ CLL BLD: 0.5 % (ref 1.4–2.6)
CD3+CD8+ CELLS # BLD: 1048 CELLS/UL (ref 125–1312)
CD3+CD8+ CELLS NFR BLD: 47 % (ref 10–40)
CHLORIDE SERPL-SCNC: 105 MMOL/L (ref 98–107)
COLOR UR AUTO: ABNORMAL
CREAT SERPL-MCNC: 0.5 MG/DL (ref 0.67–1.17)
DEPRECATED HCO3 PLAS-SCNC: 22 MMOL/L (ref 22–29)
ERYTHROCYTE [DISTWIDTH] IN BLOOD BY AUTOMATED COUNT: 13.6 % (ref 10–15)
GFR SERPL CREATININE-BSD FRML MDRD: >90 ML/MIN/1.73M2
GLUCOSE BLDC GLUCOMTR-MCNC: 213 MG/DL (ref 70–99)
GLUCOSE BLDC GLUCOMTR-MCNC: 216 MG/DL (ref 70–99)
GLUCOSE BLDC GLUCOMTR-MCNC: 216 MG/DL (ref 70–99)
GLUCOSE BLDC GLUCOMTR-MCNC: 234 MG/DL (ref 70–99)
GLUCOSE BLDC GLUCOMTR-MCNC: 264 MG/DL (ref 70–99)
GLUCOSE BLDC GLUCOMTR-MCNC: 266 MG/DL (ref 70–99)
GLUCOSE SERPL-MCNC: 324 MG/DL (ref 70–99)
GLUCOSE UR STRIP-MCNC: >=1000 MG/DL
HBA1C MFR BLD: 13.3 %
HCO3 BLDV-SCNC: 25 MMOL/L (ref 21–28)
HCT VFR BLD AUTO: 40.2 % (ref 40–53)
HGB BLD-MCNC: 13.3 G/DL (ref 13.3–17.7)
HGB UR QL STRIP: NEGATIVE
HOLD SPECIMEN: NORMAL
KETONES UR STRIP-MCNC: 40 MG/DL
LACTATE SERPL-SCNC: 1 MMOL/L (ref 0.7–2)
LEUKOCYTE ESTERASE UR QL STRIP: NEGATIVE
MCH RBC QN AUTO: 28.7 PG (ref 26.5–33)
MCHC RBC AUTO-ENTMCNC: 33.1 G/DL (ref 31.5–36.5)
MCV RBC AUTO: 87 FL (ref 78–100)
NITRATE UR QL: NEGATIVE
O2/TOTAL GAS SETTING VFR VENT: 28 %
PCO2 BLDV: 40 MM HG (ref 40–50)
PH BLDV: 7.39 [PH] (ref 7.32–7.43)
PH UR STRIP: 8 [PH] (ref 5–7)
PHOSPHATE SERPL-MCNC: 3.1 MG/DL (ref 2.5–4.5)
PLATELET # BLD AUTO: 301 10E3/UL (ref 150–450)
PO2 BLDV: 46 MM HG (ref 25–47)
POTASSIUM SERPL-SCNC: 3.1 MMOL/L (ref 3.4–5.3)
POTASSIUM SERPL-SCNC: 4 MMOL/L (ref 3.4–5.3)
PTH-INTACT SERPL-MCNC: 44 PG/ML (ref 15–65)
RBC # BLD AUTO: 4.64 10E6/UL (ref 4.4–5.9)
RBC URINE: 0 /HPF
SODIUM SERPL-SCNC: 138 MMOL/L (ref 136–145)
SP GR UR STRIP: 1.05 (ref 1–1.03)
T CELL COMMENT: ABNORMAL
UROBILINOGEN UR STRIP-MCNC: NORMAL MG/DL
WBC # BLD AUTO: 7 10E3/UL (ref 4–11)
WBC URINE: <1 /HPF

## 2023-08-07 PROCEDURE — 36415 COLL VENOUS BLD VENIPUNCTURE: CPT | Performed by: EMERGENCY MEDICINE

## 2023-08-07 PROCEDURE — 250N000011 HC RX IP 250 OP 636: Performed by: EMERGENCY MEDICINE

## 2023-08-07 PROCEDURE — 74018 RADEX ABDOMEN 1 VIEW: CPT

## 2023-08-07 PROCEDURE — 250N000009 HC RX 250

## 2023-08-07 PROCEDURE — 120N000002 HC R&B MED SURG/OB UMMC

## 2023-08-07 PROCEDURE — 99207 PR NO BILLABLE SERVICE THIS VISIT: CPT | Performed by: INTERNAL MEDICINE

## 2023-08-07 PROCEDURE — 82962 GLUCOSE BLOOD TEST: CPT

## 2023-08-07 PROCEDURE — 81001 URINALYSIS AUTO W/SCOPE: CPT | Performed by: EMERGENCY MEDICINE

## 2023-08-07 PROCEDURE — 99223 1ST HOSP IP/OBS HIGH 75: CPT | Mod: GC | Performed by: INTERNAL MEDICINE

## 2023-08-07 PROCEDURE — 82803 BLOOD GASES ANY COMBINATION: CPT | Performed by: EMERGENCY MEDICINE

## 2023-08-07 PROCEDURE — 250N000011 HC RX IP 250 OP 636: Performed by: PHYSICIAN ASSISTANT

## 2023-08-07 PROCEDURE — 36415 COLL VENOUS BLD VENIPUNCTURE: CPT

## 2023-08-07 PROCEDURE — 80048 BASIC METABOLIC PNL TOTAL CA: CPT | Performed by: EMERGENCY MEDICINE

## 2023-08-07 PROCEDURE — 83970 ASSAY OF PARATHORMONE: CPT

## 2023-08-07 PROCEDURE — 74018 RADEX ABDOMEN 1 VIEW: CPT | Mod: 26 | Performed by: RADIOLOGY

## 2023-08-07 PROCEDURE — 87536 HIV-1 QUANT&REVRSE TRNSCRPJ: CPT

## 2023-08-07 PROCEDURE — 258N000003 HC RX IP 258 OP 636: Performed by: INTERNAL MEDICINE

## 2023-08-07 PROCEDURE — 999N000248 HC STATISTIC IV INSERT WITH US BY RN

## 2023-08-07 PROCEDURE — 999N000285 HC STATISTIC VASC ACCESS LAB DRAW WITH PIV START

## 2023-08-07 PROCEDURE — 250N000013 HC RX MED GY IP 250 OP 250 PS 637

## 2023-08-07 PROCEDURE — 36415 COLL VENOUS BLD VENIPUNCTURE: CPT | Performed by: INTERNAL MEDICINE

## 2023-08-07 PROCEDURE — 250N000009 HC RX 250: Performed by: INTERNAL MEDICINE

## 2023-08-07 PROCEDURE — 258N000003 HC RX IP 258 OP 636: Performed by: EMERGENCY MEDICINE

## 2023-08-07 PROCEDURE — 96376 TX/PRO/DX INJ SAME DRUG ADON: CPT | Performed by: EMERGENCY MEDICINE

## 2023-08-07 PROCEDURE — 83605 ASSAY OF LACTIC ACID: CPT | Performed by: EMERGENCY MEDICINE

## 2023-08-07 PROCEDURE — 250N000011 HC RX IP 250 OP 636: Mod: JZ

## 2023-08-07 PROCEDURE — 86360 T CELL ABSOLUTE COUNT/RATIO: CPT

## 2023-08-07 PROCEDURE — 84100 ASSAY OF PHOSPHORUS: CPT | Performed by: INTERNAL MEDICINE

## 2023-08-07 PROCEDURE — 85027 COMPLETE CBC AUTOMATED: CPT

## 2023-08-07 PROCEDURE — 84132 ASSAY OF SERUM POTASSIUM: CPT | Performed by: INTERNAL MEDICINE

## 2023-08-07 PROCEDURE — 99284 EMERGENCY DEPT VISIT MOD MDM: CPT | Mod: GC | Performed by: SURGERY

## 2023-08-07 PROCEDURE — 999N000065 XR ABDOMEN PORT 1 VIEW

## 2023-08-07 PROCEDURE — 250N000012 HC RX MED GY IP 250 OP 636 PS 637

## 2023-08-07 RX ORDER — SODIUM CHLORIDE, SODIUM LACTATE, POTASSIUM CHLORIDE, CALCIUM CHLORIDE 600; 310; 30; 20 MG/100ML; MG/100ML; MG/100ML; MG/100ML
INJECTION, SOLUTION INTRAVENOUS CONTINUOUS
Status: DISCONTINUED | OUTPATIENT
Start: 2023-08-07 | End: 2023-08-08

## 2023-08-07 RX ORDER — HYDROMORPHONE HYDROCHLORIDE 1 MG/ML
0.5 INJECTION, SOLUTION INTRAMUSCULAR; INTRAVENOUS; SUBCUTANEOUS
Status: COMPLETED | OUTPATIENT
Start: 2023-08-07 | End: 2023-08-07

## 2023-08-07 RX ORDER — POLYETHYLENE GLYCOL 3350 17 G/17G
17 POWDER, FOR SOLUTION ORAL DAILY
Status: DISCONTINUED | OUTPATIENT
Start: 2023-08-07 | End: 2023-08-07

## 2023-08-07 RX ORDER — HYDROMORPHONE HYDROCHLORIDE 1 MG/ML
0.5 INJECTION, SOLUTION INTRAMUSCULAR; INTRAVENOUS; SUBCUTANEOUS
Status: DISCONTINUED | OUTPATIENT
Start: 2023-08-07 | End: 2023-08-07

## 2023-08-07 RX ORDER — HYDROMORPHONE HYDROCHLORIDE 1 MG/ML
0.5 INJECTION, SOLUTION INTRAMUSCULAR; INTRAVENOUS; SUBCUTANEOUS
Status: DISCONTINUED | OUTPATIENT
Start: 2023-08-07 | End: 2023-08-13

## 2023-08-07 RX ORDER — NALOXONE HYDROCHLORIDE 0.4 MG/ML
0.2 INJECTION, SOLUTION INTRAMUSCULAR; INTRAVENOUS; SUBCUTANEOUS
Status: DISCONTINUED | OUTPATIENT
Start: 2023-08-07 | End: 2023-08-16 | Stop reason: HOSPADM

## 2023-08-07 RX ORDER — CALCIUM GLUCONATE 20 MG/ML
2 INJECTION, SOLUTION INTRAVENOUS ONCE
Status: COMPLETED | OUTPATIENT
Start: 2023-08-07 | End: 2023-08-07

## 2023-08-07 RX ORDER — BUPROPION HYDROCHLORIDE 150 MG/1
150 TABLET ORAL DAILY
Status: DISCONTINUED | OUTPATIENT
Start: 2023-08-07 | End: 2023-08-16 | Stop reason: HOSPADM

## 2023-08-07 RX ORDER — POTASSIUM CHLORIDE 7.45 MG/ML
10 INJECTION INTRAVENOUS
Status: COMPLETED | OUTPATIENT
Start: 2023-08-07 | End: 2023-08-07

## 2023-08-07 RX ORDER — ONDANSETRON 2 MG/ML
4 INJECTION INTRAMUSCULAR; INTRAVENOUS EVERY 6 HOURS PRN
Status: DISCONTINUED | OUTPATIENT
Start: 2023-08-07 | End: 2023-08-16 | Stop reason: HOSPADM

## 2023-08-07 RX ORDER — NICOTINE POLACRILEX 4 MG
15-30 LOZENGE BUCCAL
Status: DISCONTINUED | OUTPATIENT
Start: 2023-08-07 | End: 2023-08-13

## 2023-08-07 RX ORDER — KETOROLAC TROMETHAMINE 15 MG/ML
30 INJECTION, SOLUTION INTRAMUSCULAR; INTRAVENOUS EVERY 6 HOURS PRN
Status: DISPENSED | OUTPATIENT
Start: 2023-08-07 | End: 2023-08-12

## 2023-08-07 RX ORDER — NALOXONE HYDROCHLORIDE 0.4 MG/ML
0.4 INJECTION, SOLUTION INTRAMUSCULAR; INTRAVENOUS; SUBCUTANEOUS
Status: DISCONTINUED | OUTPATIENT
Start: 2023-08-07 | End: 2023-08-16 | Stop reason: HOSPADM

## 2023-08-07 RX ORDER — FAMOTIDINE 20 MG/1
40 TABLET, FILM COATED ORAL DAILY
Status: DISCONTINUED | OUTPATIENT
Start: 2023-08-07 | End: 2023-08-16 | Stop reason: HOSPADM

## 2023-08-07 RX ORDER — AMOXICILLIN 250 MG
1 CAPSULE ORAL 2 TIMES DAILY
Status: DISCONTINUED | OUTPATIENT
Start: 2023-08-07 | End: 2023-08-07

## 2023-08-07 RX ORDER — HYDROMORPHONE HYDROCHLORIDE 1 MG/ML
0.5 INJECTION, SOLUTION INTRAMUSCULAR; INTRAVENOUS; SUBCUTANEOUS EVERY 4 HOURS PRN
Status: DISCONTINUED | OUTPATIENT
Start: 2023-08-07 | End: 2023-08-07

## 2023-08-07 RX ORDER — ONDANSETRON 4 MG/1
4 TABLET, ORALLY DISINTEGRATING ORAL EVERY 6 HOURS PRN
Status: DISCONTINUED | OUTPATIENT
Start: 2023-08-07 | End: 2023-08-16 | Stop reason: HOSPADM

## 2023-08-07 RX ORDER — LIDOCAINE 40 MG/G
CREAM TOPICAL
Status: DISCONTINUED | OUTPATIENT
Start: 2023-08-07 | End: 2023-08-16 | Stop reason: HOSPADM

## 2023-08-07 RX ORDER — DEXTROSE MONOHYDRATE 25 G/50ML
25-50 INJECTION, SOLUTION INTRAVENOUS
Status: DISCONTINUED | OUTPATIENT
Start: 2023-08-07 | End: 2023-08-13

## 2023-08-07 RX ADMIN — BICTEGRAVIR SODIUM, EMTRICITABINE, AND TENOFOVIR ALAFENAMIDE FUMARATE 1 TABLET: 50; 200; 25 TABLET ORAL at 08:20

## 2023-08-07 RX ADMIN — SODIUM CHLORIDE, POTASSIUM CHLORIDE, SODIUM LACTATE AND CALCIUM CHLORIDE: 600; 310; 30; 20 INJECTION, SOLUTION INTRAVENOUS at 04:13

## 2023-08-07 RX ADMIN — HYDROMORPHONE HYDROCHLORIDE 0.5 MG: 1 INJECTION, SOLUTION INTRAMUSCULAR; INTRAVENOUS; SUBCUTANEOUS at 01:35

## 2023-08-07 RX ADMIN — INSULIN ASPART 1 UNITS: 100 INJECTION, SOLUTION INTRAVENOUS; SUBCUTANEOUS at 20:28

## 2023-08-07 RX ADMIN — IOPAMIDOL 92 ML: 755 INJECTION, SOLUTION INTRAVENOUS at 00:06

## 2023-08-07 RX ADMIN — POTASSIUM CHLORIDE 10 MEQ: 7.46 INJECTION, SOLUTION INTRAVENOUS at 08:48

## 2023-08-07 RX ADMIN — POTASSIUM CHLORIDE 10 MEQ: 7.46 INJECTION, SOLUTION INTRAVENOUS at 10:25

## 2023-08-07 RX ADMIN — SODIUM CHLORIDE, POTASSIUM CHLORIDE, SODIUM LACTATE AND CALCIUM CHLORIDE: 600; 310; 30; 20 INJECTION, SOLUTION INTRAVENOUS at 23:13

## 2023-08-07 RX ADMIN — SODIUM CHLORIDE, POTASSIUM CHLORIDE, SODIUM LACTATE AND CALCIUM CHLORIDE: 600; 310; 30; 20 INJECTION, SOLUTION INTRAVENOUS at 14:55

## 2023-08-07 RX ADMIN — HYDROMORPHONE HYDROCHLORIDE 0.5 MG: 1 INJECTION, SOLUTION INTRAMUSCULAR; INTRAVENOUS; SUBCUTANEOUS at 20:34

## 2023-08-07 RX ADMIN — INSULIN ASPART 2 UNITS: 100 INJECTION, SOLUTION INTRAVENOUS; SUBCUTANEOUS at 08:22

## 2023-08-07 RX ADMIN — HYDROMORPHONE HYDROCHLORIDE 0.5 MG: 1 INJECTION, SOLUTION INTRAMUSCULAR; INTRAVENOUS; SUBCUTANEOUS at 00:40

## 2023-08-07 RX ADMIN — POTASSIUM CHLORIDE 10 MEQ: 7.46 INJECTION, SOLUTION INTRAVENOUS at 06:47

## 2023-08-07 RX ADMIN — BUPROPION HYDROCHLORIDE 150 MG: 150 TABLET, FILM COATED, EXTENDED RELEASE ORAL at 08:20

## 2023-08-07 RX ADMIN — INSULIN ASPART 1 UNITS: 100 INJECTION, SOLUTION INTRAVENOUS; SUBCUTANEOUS at 16:22

## 2023-08-07 RX ADMIN — INSULIN ASPART 2 UNITS: 100 INJECTION, SOLUTION INTRAVENOUS; SUBCUTANEOUS at 06:23

## 2023-08-07 RX ADMIN — HYDROMORPHONE HYDROCHLORIDE 0.5 MG: 1 INJECTION, SOLUTION INTRAMUSCULAR; INTRAVENOUS; SUBCUTANEOUS at 15:24

## 2023-08-07 RX ADMIN — POTASSIUM PHOSPHATE, MONOBASIC POTASSIUM PHOSPHATE, DIBASIC 9 MMOL: 224; 236 INJECTION, SOLUTION, CONCENTRATE INTRAVENOUS at 06:57

## 2023-08-07 RX ADMIN — DIATRIZOATE MEGLUMINE AND DIATRIZOATE SODIUM 120 ML: 660; 100 SOLUTION ORAL; RECTAL at 14:17

## 2023-08-07 RX ADMIN — CALCIUM GLUCONATE 2 G: 20 INJECTION, SOLUTION INTRAVENOUS at 08:28

## 2023-08-07 RX ADMIN — FAMOTIDINE 40 MG: 20 TABLET ORAL at 08:20

## 2023-08-07 RX ADMIN — POTASSIUM CHLORIDE 10 MEQ: 7.46 INJECTION, SOLUTION INTRAVENOUS at 04:53

## 2023-08-07 RX ADMIN — HYDROMORPHONE HYDROCHLORIDE 0.5 MG: 1 INJECTION, SOLUTION INTRAMUSCULAR; INTRAVENOUS; SUBCUTANEOUS at 04:13

## 2023-08-07 RX ADMIN — INSULIN ASPART 1 UNITS: 100 INJECTION, SOLUTION INTRAVENOUS; SUBCUTANEOUS at 12:47

## 2023-08-07 RX ADMIN — TOPICAL ANESTHETIC 0.5 ML: 200 SPRAY DENTAL; PERIODONTAL at 01:34

## 2023-08-07 RX ADMIN — HYDROMORPHONE HYDROCHLORIDE 0.5 MG: 1 INJECTION, SOLUTION INTRAMUSCULAR; INTRAVENOUS; SUBCUTANEOUS at 11:32

## 2023-08-07 ASSESSMENT — ACTIVITIES OF DAILY LIVING (ADL)
ADLS_ACUITY_SCORE: 37
ADLS_ACUITY_SCORE: 37
DRESSING/BATHING_DIFFICULTY: NO
WEAR_GLASSES_OR_BLIND: YES
ADLS_ACUITY_SCORE: 37
ADLS_ACUITY_SCORE: 22
ADLS_ACUITY_SCORE: 22
DOING_ERRANDS_INDEPENDENTLY_DIFFICULTY: NO
ADLS_ACUITY_SCORE: 22
ADLS_ACUITY_SCORE: 37
FALL_HISTORY_WITHIN_LAST_SIX_MONTHS: NO
CHANGE_IN_FUNCTIONAL_STATUS_SINCE_ONSET_OF_CURRENT_ILLNESS/INJURY: NO
ADLS_ACUITY_SCORE: 37
DIFFICULTY_COMMUNICATING: NO
CONCENTRATING,_REMEMBERING_OR_MAKING_DECISIONS_DIFFICULTY: NO
ADLS_ACUITY_SCORE: 37
ADLS_ACUITY_SCORE: 22
TOILETING_ISSUES: NO
ADLS_ACUITY_SCORE: 37
DIFFICULTY_EATING/SWALLOWING: NO
HEARING_DIFFICULTY_OR_DEAF: NO
ADLS_ACUITY_SCORE: 37
WALKING_OR_CLIMBING_STAIRS_DIFFICULTY: NO
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
## 2023-08-07 NOTE — ED PROVIDER NOTES
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 who presents today with abdominal pain, similar to prior SBOs.    PRIOR HISTORY REVIEW:     Patient has a known history of multiple previous bowel obstructions.  He has a history of recurrent SBO of unclear etiology, but has had multiple previous abdominal surgeries before, generally does not get surgical intervention for the SBOs themselves. He was admitted to [facility] from 5/6/2023 to 5/8/2023 for a small bowel obstruction. Patient was admitted  for acute onset LLQ abdominal pain, nausea, and vomiting and was found to have recurrence of small bowel obstruction. His condition improved after his procedure. On 5/9/2023 patient eloped from hospital and was formally discharged after he was found to be out of his room for 3+ hours without informing staff. Patient left without arrange medications and follow-up plan.       CURRENT PRESENTATION:   Patient presents with a 1 day history of abdominal pain.  He reports pain started this morning, worse on the left side of the abdomen.  It is up to 10 out of 10 at its worst, is coming somewhat in waves, but constant to some degree all throughout the day.  Associated with nausea and nonbloody emesis.  Reports he hasn't been able to keep anything down without vomiting.  Reports decreased flatus.  Has not had any BMs today.  Prior to this last BMs were normal, no blood or melena.  Denies any urinary symptoms, no dysuria, change in urinary frequency,  hematuria, etc.  No blood or melena in stools.  Denies any fevers or chills.  No lightheadedness, dizziness, syncope or near syncope.  No chest pain or breathing symptoms.  No neck or back symptoms.  No headaches or other HEENT symptoms.    Patient describes symptoms as feeling similar to previous bowel obstructions, no new features or symptoms that differ from prior bowel obstruction presentations.  No other new symptoms or complaints this time.  Full ROS completed without any additional findings.  ---  This part of the medical record was transcribed by Glen Jaime, Medical Scribe, from a dictation done by Ceci Trammell MD.              Past Medical History  Past Medical History:   Diagnosis Date    Acute appendicitis with localized peritonitis 1/31/2018    AIDS (H)     Allergic rhinitis due to other allergen     DNS    Anal dysplasia     Chronic abdominal pain     CNS toxoplasmosis (H)     COVID-19 1/11/2022    Diabetes type 2, controlled (H)     GERD (gastroesophageal reflux disease)     Herpes zoster 9/23/2016    History of seizure     History of substance abuse (H)     HIV (human immunodeficiency virus infection) (H)     HLD (hyperlipidemia)     Lung nodules     Periungual wart     Pneumonia 1/6/2019    PTSD (post-traumatic stress disorder)     Sleep apnea     doesn't use CPAP     Past Surgical History:   Procedure Laterality Date    ANOSCOPY N/A 9/9/2020    Procedure: Exam Under Anesthesia, ANOSCOPY, fulgeration of rectal fissures with Rectal Biopsies;  Surgeon: Thanh Lundberg MD;  Location: [facility]    COLONOSCOPY Left 1/22/2016    Procedure: COMBINED COLONOSCOPY, SINGLE OR MULTIPLE BIOPSY/POLYPECTOMY BY BIOPSY;  Surgeon: Clark Saini MD;  [facility]    ESOPHAGOSCOPY, GASTROSCOPY, DUODENOSCOPY (EGD), COMBINED N/A 6/6/2022    Procedure: ESOPHAGOGASTRODUODENOSCOPY, WITH BIOPSY;  Surgeon: Kecia Benítez MD;  Location: [facility]    HC EXPLORE UNDESC TESTIS,INGUIN/SCROTAL      LAPAROSCOPIC  "APPENDECTOMY N/A 1/31/2018    Procedure: LAPAROSCOPIC APPENDECTOMY;  LAPAROSCOPIC APPENDECTOMY;  Surgeon: Dawn Holt MD;  Location: [facility]    LAPAROSCOPY DIAGNOSTIC (GENERAL) N/A 7/26/2016    Procedure: LAPAROSCOPY DIAGNOSTIC (GENERAL);  Surgeon: Susannah Arraiga MD;  [facility]: [facility]    LAPAROSCOPY DIAGNOSTIC (GENERAL) N/A 4/16/2018    Procedure: LAPAROSCOPY DIAGNOSTIC (GENERAL);  Diagnostic laparoscopy and lysis of adhesions;  Surgeon: Prince Dowling MD;  Location: [facility]    OPTICAL TRACKING SYSTEM CRANIOTOMY, EXCISE TUMOR, COMBINED Left 4/10/2015    Procedure: COMBINED OPTICAL TRACKING SYSTEM CRANIOTOMY, EXCISE TUMOR;  Surgeon: Mirlande Colmenares MD;  Location: [facility]    REPAIR GAMEKEEPER'S THUMB Right 12/2/2016    Procedure: REPAIR LIGAMENT ULNAR COLLATERAL THUMB (GAMEKEEPER'S);  Surgeon: Evin Zamorano MD;  Location: [facility]    ZZC NONSPECIFIC PROCEDURE      right forearm fracture     bictegravir-emtricitabine-tenofovir (BIKTARVY) -25 MG per tablet  blood glucose (NO BRAND SPECIFIED) lancets standard  blood glucose (NO BRAND SPECIFIED) test strip  blood glucose monitoring (NO BRAND SPECIFIED) meter device kit  buPROPion (WELLBUTRIN XL) 150 MG 24 hr tablet  famotidine (PEPCID) 40 MG tablet  glimepiride (AMARYL) 4 MG tablet  ibuprofen (ADVIL/MOTRIN) 600 MG tablet  levofloxacin (LEVAQUIN) 500 MG tablet  metFORMIN (FORTAMET) 1000 MG 24 hr tablet  metFORMIN osmotic (FORTAMET) 1000 MG 24 hr tablet  sitagliptin (JANUVIA) 100 MG tablet  sulfamethoxazole-trimethoprim (BACTRIM DS) 800-160 MG tablet      Allergies   Allergen Reactions    Fentanyl Blisters     Per pt, after taking this medication had blisters develope    Tylenol [Acetaminophen] Itching    Dulaglutide Rash    Insulin Lispro Rash     Patient reported    Penicillin V Other (See Comments) and Rash     Diffuse maculopapular rash + feels \"high\", per pt.      Family History  Family History   Problem Relation Age of Onset    " "Diabetes Brother     Diabetes Father     Alzheimer Disease Father     Unknown/Adopted Mother     Diabetes Paternal Grandfather     Cancer No family hx of         no skin cancer    Skin Cancer No family hx of         no famiy hx of skin cancer    Glaucoma No family hx of     Macular Degeneration No family hx of      Social History   Social History     Tobacco Use    Smoking status: Some Days     Packs/day: 0.25     Years: 40.00     Pack years: 10.00     Types: Cigarettes    Smokeless tobacco: Former   Substance Use Topics    Alcohol use: No     Alcohol/week: 0.0 standard drinks of alcohol     Comment: Last etoh in 2007    Drug use: Not Currently     Types: Marijuana, Methamphetamines      Past medical history, past surgical history, medications, allergies, family history, and social history were reviewed with the patient. No additional pertinent items.      A complete review of systems was performed with pertinent positives and negatives noted in the HPI, and all other systems negative.    Physical Exam   BP: 118/77  Pulse: 98  Temp: 97.5  F (36.4  C)  Resp: 22  Height: 162.6 cm (5' 4\")  Weight: 68 kg (150 lb)  SpO2: 100 %  Physical Exam  CONSTITUTIONAL: Awake, alert, nontoxic appearance. patient does appear to have vomited on the floor.  Nonbloody.  HENT:   - Head: Normocephalic and atraumatic.   - Ears: External ear grossly normal.   - Nose: Nose normal. No rhinorrhea. No epistaxis.   - Mouth/Throat: MMM  EYES: Conjunctivae and lids are normal. No scleral icterus.   NECK: Normal range of motion and phonation normal. Neck supple.  No tracheal deviation, no stridor. No edema or erythema noted.  CARDIOVASCULAR: Normal rate, regular rhythm and no appreciable abnormal heart sounds.  PULMONARY/CHEST: Normal work of breathing. No accessory muscle usage or stridor. No respiratory distress.  No appreciable abnormal breath sounds.  ABDOMEN: Some diffuse abdominal fullness, has diffuse abdominal discomfort to palpation, worse " in the whole of the left abdomen.  No prachi peritoneal findings.No palpable masses or abnormal pulsatility.   MUSCULOSKELETAL: Extremities warm and seemingly well perfused. No edema or calf tenderness.  NEUROLOGIC: Awake, alert. Not disoriented. No seizure activity. GCS 15  SKIN: Skin is warm and dry. No rash noted. No diaphoresis. No pallor.   PSYCHIATRIC: Normal mood and affect. Speech and behavior normal. Thought processes linear. Cognition and memory are normal. No SI/HI reported.      ED Course, Procedures, & Data     ED Course as of 08/07/23 0113   Sun Aug 06, 2023   2336 Potassium: 3.9  I-STAT reported his potassium was 2.2, unclear how accurate this may be given the significant difference.  We will recheck labs.   2339 Potassium POCT(!!): 2.2  Regular lab was read as 3.9, reordering to verify actual levle   Mon Aug 07, 2023   0026 Imaging results from Radiology not yet available.  I reviewed the imaging myself, I am concerned for bowel obstruction.  Surgery paged.   3747 Discussed w/ Surgery -they are coming to see evaluate patient, they have already reviewed the imaging and do think that there is a bowel obstruction.  They would favor an NGT.  Patient was hesitant for this and so they will discuss with the patient when they come to see and evaluate.         Critical care was not performed.     Medical Decision Making  The patient's presentation was of high complexity (an acute health issue posing potential threat to life or bodily function).    The patient's evaluation involved:  review of external note(s) from 3+ sources (see separate area of note for details)  ordering and/or review of 3+ test(s) in this encounter (see separate area of note for details)  review of 3+ test result(s) ordered prior to this encounter (see separate area of note for details)  discussion of management or test interpretation with another health professional (see separate area of note for details)    The patient's management  necessitated high risk (a parenteral controlled substance), high risk (a decision regarding hospitalization), and further care after sign-out to admitting IM team, Surgery team and overnight EM physician (see their note for further management).    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 who presents today with abdominal pain.        Clinically, patient appears nontoxic, NAD though he does appear symptomatic, but abdomen is somewhat full and does have some diffuse and then worse left abdominal discomfort palpation but without prachi peritoneal findings.     Ddx includes, but not limited to,  recurrent bowel obstruction which are clinically most concerned for, gastritis/PUD, pancreatitis, colitis, diverticulitis, UTI, amongst others.  No clear findings on exam for acute aortic pathology.    PLAN:   - Laboratory studies, urine studies, abdominal imaging, symptom management, NGT if needed, Surgery consult if needed  - Risks/benefits of pursuing imaging reviewed and accepted.     RESULTS:  Labs:   - lactate 2.6, no leukocytosis.  Hgb improved to previous.  Anion gap normal, normal creatinine, LFTs grossly WNL, lipase normal  Imaging: Finalized report pending, does appear to have SBO (Images reviewed and interpreted by myself as well as discussed w/ Surgery and available written prelim   Results/reports reviewed w/ patient who expresses understanding of findings and F/U recommendations.    INTERVENTIONS:   - IVF  - IVF Dilaudid  - Surgical consult  - NGT    RE-EVALUATION:  - Pt otherwise continues to do well here in the ED, no acute issues or apparent concerning changes in vitals or clinical appearance.    DISCUSSIONS:  - w/ Surgery: They have  seen and evaluated the patient here in the ED. They recommend NGT but not immediate surgical intervention. They will continue to follow.   - w/ IM: Reviewed patient/presentation, current state of workup/any pending studies. They will admit for further evaluation/management, F/U pending studies as needed, coordinate w/ consulting services as needed. No additional requests/recommendations for workup/management for in the ED at this time.   - w/ Patient: I have reviewed the available findings, plan with the patient. He expressed understanding and agreement with this plan. All questions answered to the best of our ability at this time.       DISPOSITION/PLANNING:  IMPRESSION:   - SBO, abdominal pain, N/V  - Elevated lactic acidosis  - Hyperglycemia w/ elevated ketones  DISPOSITION:  - Admit to IM, Surgery to follow  OTHER RECOMMENDATIONS:   - Surgery consult      ______________________________________________________________________            Ceci Trammell MD  [facility] EMERGENCY DEPARTMENT  8/6/2023     Ceci Trammell MD  08/08/23 0155

## 2023-08-07 NOTE — ED TRIAGE NOTES
Pt presents to the ER for complain of abdominal pain onset this morning. Pt reports Nausea and vomiting. Pain restless in triage and rates pain 10/10. Pt reports Hx of SBO which he things this might be related to SBO.      Triage Assessment       Row Name 08/06/23 3423       Triage Assessment (Adult)    Airway WDL WDL       Respiratory WDL    Respiratory WDL WDL       Skin Circulation/Temperature WDL    Skin Circulation/Temperature WDL WDL       Cardiac WDL    Cardiac WDL WDL       Peripheral/Neurovascular WDL    Peripheral Neurovascular WDL WDL       Cognitive/Neuro/Behavioral WDL    Cognitive/Neuro/Behavioral WDL WDL       [place] Coma Scale    Best Eye Response 4-->(E4) spontaneous    Best Motor Response 6-->(M6) obeys commands    Best Verbal Response 5-->(V5) oriented    [place] Coma Scale Score 15

## 2023-08-07 NOTE — PLAN OF CARE
Goal Outcome Evaluation:      Plan of Care Reviewed With: patient    Overall Patient Progress: no change    Outcome Evaluation: Pt admitted from ED around 1500. Sleepy at times but arousable. Oriented. VSS on RA. Up w/ SBA-ind. IV dilaudid given for abd pain. NGT to LIS. LR infusing at 125 mL/hr through R PIV. Refused assessment and 4 eyes this shift because pt wanted to sleep. Gastrografin challenge. Plan for xray at 2215. Will cont to monitor.

## 2023-08-07 NOTE — ED NOTES
Pt drinking liquids while NPO. Provider was in room and said that it was ok and pt verbalized understanding that it would be sucked out through the NG tube. Not to have any solids    5

## 2023-08-07 NOTE — PROGRESS NOTES
"[facility]    Progress Note - Emergency General Surgery Service       Date of Admission:  8/6/2023    Assessment & Plan: Surgery   Andrew Collier is a 57 year old male with PMH of HIV, Type 2 DM, perforated appendicitis s/p appendectomy, substance abuse, and recurrent SBO admitted 8/6/23 for abd pain with CT 8/7 showing SBO with smooth tapering transition distally and no suspicious transition point. Pt has been hypertensive to the 150s systolic but otherwise has had normal heart rate, afebrile, and saturating well on room air. No evidence of SIRS. Not peritonitic. This morning patient reporting last BM around 5 pm and reports consistently passing gas. Not requiring surgery at this time, will order Gastrografin test this afternoon to evaluate for obstruction.  - NPO, NGT  - Gastrografin challenge this afternoon  - mIVF  - No urgent surgical intervention needed at this time  - EGS will continue to follow       Diet: NPO for Medical/Clinical Reasons Except for: No Exceptions    DVT Prophylaxis: Defer to primary service  Potts Catheter: Not present  Lines: None     Drains: None     Cardiac Monitoring: None  Code Status: Full Code      Clinically Significant Risk Factors Present on Admission        # Hypokalemia: Lowest K = 2.2 mmol/L in last 2 days, will replace as needed  # Hypernatremia: Highest Na = 147 mmol/L in last 2 days, will monitor as appropriate  # Hypocalcemia: Lowest Ca = 7.7 mg/dL in last 2 days, will monitor and replace as appropriate  # Hypercalcemia: Highest Ca = 10.2 mg/dL in last 2 days, will monitor as appropriate            # DMII: A1C = 13.3 % (Ref range: <5.7 %) within past 6 months    # Overweight: Estimated body mass index is 25.75 kg/m  as calculated from the following:    Height as of this encounter: 1.626 m (5' 4\").    Weight as of this encounter: 68 kg (150 lb).            Disposition Plan      Expected Discharge Date: 08/09/2023     "             The patient's care was discussed with the chief Dr. Rogers who discussed with staff.    Clay York MD  [facility]  Non-urgent messages: Securely message with [employer] (more info)  Text page via [employer] Paging/Directory     ______________________________________________________________________    Interval History   No acute events overnight, afebrile, hypertensive to 150s, saturating well on room air. Patient reports decreased abdominal pain, nausea, and denies vomiting. States they are passing gas.    Physical Exam   Vital Signs: Temp: 97.6  F (36.4  C) Temp src: Oral BP: 123/56 Pulse: 80   Resp: 14 SpO2: 98 % O2 Device: None (Room air) Oxygen Delivery: 2 LPM  Weight: 150 lbs 0 oz  Intake/Output Summary (Last 24 hours) at 8/7/2023 1702  Last data filed at 8/7/2023 1500  Gross per 24 hour   Intake 1647.92 ml   Output 500 ml   Net 1147.92 ml     General Appearance:  Appears comfortable, alert, conversational  Respiratory: No increased work of breathing, no retractions, saturating well on room air  Cardiovascular: Regular rate and rhythm  GI: Tenderness to palpation in the left side, no rebound tenderness, no rigidity, well-healed midline surgical scar present  Skin: warm, dry, no rashes    Data     I have personally reviewed the following data over the past 24 hrs:    7.0  \   13.3   / 301     138 105 12.8 /  216 (H)   4.0 22 0.50 (L) \     ALT: 14 AST: 27 AP: 192 (H) TBILI: 0.5   ALB: 4.5 TOT PROTEIN: 8.0 LIPASE: 44     TSH: N/A T4: N/A A1C: 13.3 (H)     Procal: N/A CRP: N/A Lactic Acid: 1.0       INR:  0.91 PTT:  N/A   D-dimer:  N/A Fibrinogen:  N/A       Imaging results reviewed over the past 24 hrs:   Recent Results (from the past 24 hour(s))   CT Abdomen Pelvis w Contrast    Narrative    EXAM: CT ABDOMEN PELVIS W CONTRAST  LOCATION: [facility]  DATE: 8/7/2023    INDICATION: ? bowel obstruction or other cause  for abd pain, N V, decreased BM Flatus (similar to prior SBOs)  COMPARISON: 06/21/2023  TECHNIQUE: CT scan of the abdomen and pelvis was performed following injection of IV contrast. Multiplanar reformats were obtained. Dose reduction techniques were used.  CONTRAST: iopamidol (ISOVUE 370) solution 92 mL    FINDINGS:   LOWER CHEST: Normal.    HEPATOBILIARY: Gallstone.    PANCREAS: Normal.    SPLEEN: Normal.    ADRENAL GLANDS: Normal.    KIDNEYS/BLADDER: Normal.    BOWEL: Differential thickening distal esophagus otherwise decompressed. Gastric distention. Circumferential wall thickening mid duodenum. Mid small bowel fluid filled dilatation up to 5 cm in caliber. Smooth tapering transition distally. Small stool gas   burden in colon.    LYMPH NODES: Normal.    VASCULATURE: Unremarkable.    PELVIC ORGANS: Prostatomegaly. Penile prosthesis in place.    MUSCULOSKELETAL: Normal.      Impression    IMPRESSION:   1.  Gastric distention with wall thickening of mid duodenum.  2.  Mid small bowel obstruction. No suspicious transition.   XR Abdomen Port 1 View    Narrative    EXAM: XR ABDOMEN PORT 1 VIEW  LOCATION: [facility]  DATE: 8/7/2023    INDICATION: S p NGT placement.  COMPARISON: Abdominal x-ray on 4/19/2023, CT abdomen and pelvis on 8/6/2023.      Impression    IMPRESSION: Single AP view of the abdomen was obtained. Enteric tube tip and sideholes projects over the stomach.

## 2023-08-07 NOTE — PROGRESS NOTES
Medicine Staff    Received signout from Dr. Trammell:  57 year old Male with HIV/AIDS with h/o significant and frequent small bowel obstruction(SBO) requiring NG tube suction.  Returns with same associated with uncontrolled diabetes mellitus - blood glucose > 400.  Ketones somewhat  Positive.  CT abdomen showing small bowel obstruction(SBO).  NG tube placed for distended abdomen.  IVF, hydromorphone (Dilaudid ) and NG tube primary treatment.  Will assign to [facility] team for admission.  Note that general surgery service has seen the patient.  Simon Guzman MD

## 2023-08-07 NOTE — ED NOTES
Pt has had about 2000ml output from NG tube during shift. Vascular consult ordered for 2nd IV to give multiple IV meds

## 2023-08-07 NOTE — PLAN OF CARE
Goal Outcome Evaluation:    Pt is transferring to . Reports given to floor nurse     A&O x4. Sleepy easily aroused. VSS. Desats to 89% when sleeping. Placed on 2L NC for comfort. NGT to LIS with 200ml brown output on day shift. Gastrografin contrast given via NGT at 1415 and NGT clamped for 2 hrs after contrast administration. NGT can be hook back up to LIS at 1615. Abdominal xray will be done at 2215 tonight. Dilaudid iv given for abdominal pain with relief. Voiding spontaneously. No BM. Potassium and Phosphorous replaced per protocol. Recheck ordered for tomorrow am.

## 2023-08-07 NOTE — CONSULTS
[facility]    Consult  - Emergency General Surgery Service       Date of Admission:  8/6/2023   on * No surgery found *  Assessment & Plan: Surgery   Andrew Collier is a 57 year old male with recurrent SBO. Afebrile, hemodynamically stable and non-peritonitic, normal lactate, normal WBC, hypokalemic.     - Agree with medicine admission  - No urgent surgical management at this time  - NGT, NPO, mIVF     The patient's care was discussed with the Chief Resident/Fellow.    Jaya Heaton MD  [facility]  All communications related to this note should be expressed to resident/KASSY on call for this team at the time of your communication.  ______________________________________________________________________    Chief Complaint   SBO    History is obtained from the patient    History of Present Illness   56yo man with h/o HIV, homelessness, methamphetamine use disorder, and recurrent bowel obstruction who presents with abdominal pain, nausea/vomiting. He has been admitted multiple times recently including 5/6-5/8, 3-/28-3/31, 4/16 (left AMA), and 4/18-4/19 (left AMA). He has had lap appy in the past, as well as two diagnostic laparoscopies with lysis of adhesions in 2016 and 2018 for SBO. He has not had abdominal surgery since 2018 and his most recent episodes of SBO have been managed nonoperatively.        Past Medical History    Past Medical History:   Diagnosis Date    Acute appendicitis with localized peritonitis 1/31/2018    AIDS (H)     Allergic rhinitis due to other allergen     DNS    Anal dysplasia     Chronic abdominal pain     CNS toxoplasmosis (H)     COVID-19 1/11/2022    Diabetes type 2, controlled (H)     GERD (gastroesophageal reflux disease)     Herpes zoster 9/23/2016    History of seizure     History of substance abuse (H)     HIV (human immunodeficiency virus infection) (H)     HLD (hyperlipidemia)      Lung nodules     Periungual wart     Pneumonia 1/6/2019    PTSD (post-traumatic stress disorder)     Sleep apnea     doesn't use CPAP       Past Surgical History   Past Surgical History:   Procedure Laterality Date    ANOSCOPY N/A 9/9/2020    Procedure: Exam Under Anesthesia, ANOSCOPY, fulgeration of rectal fissures with Rectal Biopsies;  Surgeon: Thanh Lundberg MD;  Location: [facility] OR    COLONOSCOPY Left 1/22/2016    Procedure: COMBINED COLONOSCOPY, SINGLE OR MULTIPLE BIOPSY/POLYPECTOMY BY BIOPSY;  Surgeon: Clark Saini MD;  Location: [facility]    ESOPHAGOSCOPY, GASTROSCOPY, DUODENOSCOPY (EGD), COMBINED N/A 6/6/2022    Procedure: ESOPHAGOGASTRODUODENOSCOPY, WITH BIOPSY;  Surgeon: Kecia Benítez MD;  Location: [facility]    HC EXPLORE UNDESC TESTIS,INGUIN/SCROTAL      LAPAROSCOPIC APPENDECTOMY N/A 1/31/2018    Procedure: LAPAROSCOPIC APPENDECTOMY;  LAPAROSCOPIC APPENDECTOMY;  Surgeon: Dawn Holt MD;  Location: [facility]    LAPAROSCOPY DIAGNOSTIC (GENERAL) N/A 7/26/2016    Procedure: LAPAROSCOPY DIAGNOSTIC (GENERAL);  Surgeon: Susannah Arriaga MD;  Location: [facility]    LAPAROSCOPY DIAGNOSTIC (GENERAL) N/A 4/16/2018    Procedure: LAPAROSCOPY DIAGNOSTIC (GENERAL);  Diagnostic laparoscopy and lysis of adhesions;  Surgeon: Prince Dowling MD;  Location: [facility] OR    OPTICAL TRACKING SYSTEM CRANIOTOMY, EXCISE TUMOR, COMBINED Left 4/10/2015    Procedure: COMBINED OPTICAL TRACKING SYSTEM CRANIOTOMY, EXCISE TUMOR;  Surgeon: Mirlande Colmenares MD;  Location: [facility] OR    REPAIR GAMEKEEPER'S THUMB Right 12/2/2016    Procedure: REPAIR LIGAMENT ULNAR COLLATERAL THUMB (GAMEKEEPER'S);  Surgeon: Evin Zamorano MD;  Location: [facility] OR    ZZC NONSPECIFIC PROCEDURE      right forearm fracture       Prior to Admission Medications   Prior to Admission Medications   Prescriptions Last Dose Informant Patient Reported? Taking?   bictegravir-emtricitabine-tenofovir (BIKTARVY) -25 MG per tablet  Self No No   Sig:  Take 1 tablet by mouth daily   blood glucose (NO BRAND SPECIFIED) lancets standard  Self No No   Sig: Use to test blood sugar 1 time daily or as directed.   blood glucose (NO BRAND SPECIFIED) test strip  Self No No   Sig: Use to test blood sugar 1 time daily or as directed.   blood glucose monitoring (NO BRAND SPECIFIED) meter device kit  Self No No   Sig: Use to test blood sugar 1 time daily or as directed.   buPROPion (WELLBUTRIN XL) 150 MG 24 hr tablet  Self No No   Sig: Take 1 tablet (150 mg) by mouth daily   famotidine (PEPCID) 40 MG tablet  Self No No   Sig: Take 1 tablet (40 mg) by mouth daily   glimepiride (AMARYL) 4 MG tablet  Self No No   Sig: Take 1 tablet (4 mg) by mouth 2 times daily (before meals)   ibuprofen (ADVIL/MOTRIN) 600 MG tablet  Self No No   Sig: Take 1 tablet (600 mg) by mouth every 6 hours as needed for moderate pain (4-6)   levofloxacin (LEVAQUIN) 500 MG tablet  Self No No   Sig: Take 1 tablet (500 mg) by mouth daily   metFORMIN (FORTAMET) 1000 MG 24 hr tablet  Self No No   Sig: Take 2 tablets (2,000 mg) by mouth daily (with dinner)   Patient taking differently: Take 3,000 mg by mouth daily (with dinner)   metFORMIN osmotic (FORTAMET) 1000 MG 24 hr tablet   No No   Sig: Take 1 tablet (1,000 mg) by mouth daily (with dinner) for 30 days   sitagliptin (JANUVIA) 100 MG tablet  Self No No   Sig: Take 1 tablet (100 mg) by mouth daily   sulfamethoxazole-trimethoprim (BACTRIM DS) 800-160 MG tablet  Self No No   Sig: Take 1 tablet by mouth 2 times daily      Facility-Administered Medications: None      Con: denies fevers chills night sweats and headaches  HE: Denies changes in vision. Denies tinnitus vertigo and lightheadedness.  ENT: Denies changes in smell or taste. Denies nasal congestion, sinus congestion, rhinorrhea, cough and sore throat  Pulm/CV: Denies CP and SOB  Abd: ENDORSES nausea and vomiting. Denies abdominal pain. ENDORSES diarrhea followed by constipation  MSK: Denies back  pain  Psych: Denies suicidal ideation and homicidal ideation       Physical Exam   Vital Signs: Temp: 98  F (36.7  C) Temp src: Oral BP: (!) 146/98 Pulse: 83   Resp: 10 SpO2: 100 % O2 Device: None (Room air)    Weight: 150 lbs 0 ozNo intake or output data in the 24 hours ending 08/07/23 0047     Gen: Alert and conversational adult in NAD  Chest: breathing non-labored on ra    Normal rate, regular rhythm and by radial palpation  Abdomen: Soft, Non-tender, mild distention            Data     I have personally reviewed the following data over the past 24 hrs:    7.7  \   9.5 (L)   / 340     138 105 12.8 /  324 (H)   3.1 (L) 22 0.50 (L) \     ALT: 14 AST: 27 AP: 192 (H) TBILI: 0.5   ALB: 4.5 TOT PROTEIN: 8.0 LIPASE: 44     Procal: N/A CRP: N/A Lactic Acid: 2.6 (H)       INR:  0.91 PTT:  N/A   D-dimer:  N/A Fibrinogen:  N/A       Imaging results reviewed over the past 24 hrs:   Recent Results (from the past 24 hour(s))   CT Abdomen Pelvis w Contrast    Narrative    EXAM: CT ABDOMEN PELVIS W CONTRAST  LOCATION: [facility]  DATE: 8/7/2023    INDICATION: ? bowel obstruction or other cause for abd pain, N V, decreased BM Flatus (similar to prior SBOs)  COMPARISON: 06/21/2023  TECHNIQUE: CT scan of the abdomen and pelvis was performed following injection of IV contrast. Multiplanar reformats were obtained. Dose reduction techniques were used.  CONTRAST: iopamidol (ISOVUE 370) solution 92 mL    FINDINGS:   LOWER CHEST: Normal.    HEPATOBILIARY: Gallstone.    PANCREAS: Normal.    SPLEEN: Normal.    ADRENAL GLANDS: Normal.    KIDNEYS/BLADDER: Normal.    BOWEL: Differential thickening distal esophagus otherwise decompressed. Gastric distention. Circumferential wall thickening mid duodenum. Mid small bowel fluid filled dilatation up to 5 cm in caliber. Smooth tapering transition distally. Small stool gas   burden in colon.    LYMPH NODES: Normal.    VASCULATURE: Unremarkable.    PELVIC  ORGANS: Prostatomegaly. Penile prosthesis in place.    MUSCULOSKELETAL: Normal.      Impression    IMPRESSION:   1.  Gastric distention with wall thickening of mid duodenum.  2.  Mid small bowel obstruction. No suspicious transition.

## 2023-08-08 LAB
ANION GAP SERPL CALCULATED.3IONS-SCNC: 10 MMOL/L (ref 7–15)
BUN SERPL-MCNC: 8.3 MG/DL (ref 8–23)
CALCIUM SERPL-MCNC: 9.1 MG/DL (ref 8.6–10)
CHLORIDE SERPL-SCNC: 100 MMOL/L (ref 98–107)
CREAT SERPL-MCNC: 0.64 MG/DL (ref 0.67–1.17)
DEPRECATED HCO3 PLAS-SCNC: 24 MMOL/L (ref 22–29)
GFR SERPL CREATININE-BSD FRML MDRD: >90 ML/MIN/1.73M2
GLUCOSE BLDC GLUCOMTR-MCNC: 120 MG/DL (ref 70–99)
GLUCOSE BLDC GLUCOMTR-MCNC: 214 MG/DL (ref 70–99)
GLUCOSE BLDC GLUCOMTR-MCNC: 227 MG/DL (ref 70–99)
GLUCOSE BLDC GLUCOMTR-MCNC: 353 MG/DL (ref 70–99)
GLUCOSE BLDC GLUCOMTR-MCNC: 362 MG/DL (ref 70–99)
GLUCOSE BLDC GLUCOMTR-MCNC: 417 MG/DL (ref 70–99)
GLUCOSE BLDC GLUCOMTR-MCNC: 429 MG/DL (ref 70–99)
GLUCOSE SERPL-MCNC: 252 MG/DL (ref 70–99)
HIV1 RNA # PLAS NAA DL=20: ABNORMAL COPIES/ML
HIV1 RNA SERPL NAA+PROBE-LOG#: 4.3 {LOG_COPIES}/ML
HOLD SPECIMEN: NORMAL
PHOSPHATE SERPL-MCNC: 2.8 MG/DL (ref 2.5–4.5)
POTASSIUM SERPL-SCNC: 3.5 MMOL/L (ref 3.4–5.3)
SODIUM SERPL-SCNC: 134 MMOL/L (ref 136–145)

## 2023-08-08 PROCEDURE — 99223 1ST HOSP IP/OBS HIGH 75: CPT | Performed by: CLINICAL NURSE SPECIALIST

## 2023-08-08 PROCEDURE — 250N000013 HC RX MED GY IP 250 OP 250 PS 637: Performed by: PHYSICIAN ASSISTANT

## 2023-08-08 PROCEDURE — 99232 SBSQ HOSP IP/OBS MODERATE 35: CPT | Mod: FS | Performed by: STUDENT IN AN ORGANIZED HEALTH CARE EDUCATION/TRAINING PROGRAM

## 2023-08-08 PROCEDURE — 84100 ASSAY OF PHOSPHORUS: CPT | Performed by: INTERNAL MEDICINE

## 2023-08-08 PROCEDURE — 80048 BASIC METABOLIC PNL TOTAL CA: CPT

## 2023-08-08 PROCEDURE — 250N000011 HC RX IP 250 OP 636: Performed by: PHYSICIAN ASSISTANT

## 2023-08-08 PROCEDURE — 250N000013 HC RX MED GY IP 250 OP 250 PS 637

## 2023-08-08 PROCEDURE — 120N000002 HC R&B MED SURG/OB UMMC

## 2023-08-08 PROCEDURE — 36415 COLL VENOUS BLD VENIPUNCTURE: CPT

## 2023-08-08 PROCEDURE — 250N000013 HC RX MED GY IP 250 OP 250 PS 637: Performed by: CLINICAL NURSE SPECIALIST

## 2023-08-08 PROCEDURE — 250N000012 HC RX MED GY IP 250 OP 636 PS 637: Performed by: CLINICAL NURSE SPECIALIST

## 2023-08-08 RX ORDER — POLYETHYLENE GLYCOL 3350 17 G/17G
17 POWDER, FOR SOLUTION ORAL DAILY
Status: DISCONTINUED | OUTPATIENT
Start: 2023-08-08 | End: 2023-08-16 | Stop reason: HOSPADM

## 2023-08-08 RX ORDER — NICOTINE POLACRILEX 4 MG
15-30 LOZENGE BUCCAL
Status: DISCONTINUED | OUTPATIENT
Start: 2023-08-08 | End: 2023-08-13

## 2023-08-08 RX ORDER — DEXTROSE MONOHYDRATE 25 G/50ML
25-50 INJECTION, SOLUTION INTRAVENOUS
Status: DISCONTINUED | OUTPATIENT
Start: 2023-08-08 | End: 2023-08-13

## 2023-08-08 RX ORDER — AMOXICILLIN 250 MG
1 CAPSULE ORAL 2 TIMES DAILY
Status: DISCONTINUED | OUTPATIENT
Start: 2023-08-08 | End: 2023-08-16 | Stop reason: HOSPADM

## 2023-08-08 RX ADMIN — INSULIN ASPART 3 UNITS: 100 INJECTION, SOLUTION INTRAVENOUS; SUBCUTANEOUS at 08:01

## 2023-08-08 RX ADMIN — SITAGLIPTIN 100 MG: 100 TABLET, FILM COATED ORAL at 18:08

## 2023-08-08 RX ADMIN — INSULIN ASPART 1 UNITS: 100 INJECTION, SOLUTION INTRAVENOUS; SUBCUTANEOUS at 04:22

## 2023-08-08 RX ADMIN — INSULIN ASPART 1 UNITS: 100 INJECTION, SOLUTION INTRAVENOUS; SUBCUTANEOUS at 00:00

## 2023-08-08 RX ADMIN — INSULIN ASPART 3 UNITS: 100 INJECTION, SOLUTION INTRAVENOUS; SUBCUTANEOUS at 12:13

## 2023-08-08 RX ADMIN — BUPROPION HYDROCHLORIDE 150 MG: 150 TABLET, FILM COATED, EXTENDED RELEASE ORAL at 08:00

## 2023-08-08 RX ADMIN — POLYETHYLENE GLYCOL 3350 17 G: 17 POWDER, FOR SOLUTION ORAL at 13:29

## 2023-08-08 RX ADMIN — SENNOSIDES AND DOCUSATE SODIUM 1 TABLET: 50; 8.6 TABLET ORAL at 19:39

## 2023-08-08 RX ADMIN — INSULIN GLARGINE 12 UNITS: 100 INJECTION, SOLUTION SUBCUTANEOUS at 15:10

## 2023-08-08 RX ADMIN — FAMOTIDINE 40 MG: 20 TABLET ORAL at 08:00

## 2023-08-08 RX ADMIN — HYDROMORPHONE HYDROCHLORIDE 0.5 MG: 1 INJECTION, SOLUTION INTRAMUSCULAR; INTRAVENOUS; SUBCUTANEOUS at 04:22

## 2023-08-08 RX ADMIN — BICTEGRAVIR SODIUM, EMTRICITABINE, AND TENOFOVIR ALAFENAMIDE FUMARATE 1 TABLET: 50; 200; 25 TABLET ORAL at 10:17

## 2023-08-08 RX ADMIN — HYDROMORPHONE HYDROCHLORIDE 0.5 MG: 1 INJECTION, SOLUTION INTRAMUSCULAR; INTRAVENOUS; SUBCUTANEOUS at 08:12

## 2023-08-08 ASSESSMENT — ACTIVITIES OF DAILY LIVING (ADL)
ADLS_ACUITY_SCORE: 22
DEPENDENT_IADLS:: INDEPENDENT
ADLS_ACUITY_SCORE: 22
ADLS_ACUITY_SCORE: 22

## 2023-08-08 NOTE — CONSULTS
Care Management Initial Consult    General Information  Assessment completed with: Patient, VM-chart review,    Type of CM/SW Visit: Initial Assessment    Primary Care Provider verified and updated as needed: Yes   Readmission within the last 30 days: no previous admission in last 30 days      Reason for Consult: discharge planning, utilization management concerns  Advance Care Planning: Advance Care Planning Reviewed: no concerns identified          Communication Assessment  Patient's communication style: spoken language (English or Bilingual)    Hearing Difficulty or Deaf: no   Wear Glasses or Blind: yes    Cognitive  Cognitive/Neuro/Behavioral: WDL                      Living Environment:   People in home: other (see comments) (Congregate setting)     Current living Arrangements: shelter      Able to return to prior arrangements: yes       Family/Social Support:  Care provided by: self  Provides care for: no one  Marital Status: Single  Other (specify) (Friends)          Description of Support System: Supportive, Involved    Support Assessment: Adequate social supports    Current Resources:   Patient receiving home care services: No     Community Resources: County Programs, County Worker, HIV Counseling, Financial/Insurance  Equipment currently used at home: none  Supplies currently used at home: None    Employment/Financial:  Employment Status: unemployed        Financial Concerns: No concerns identified   Referral to Financial Worker: No       Does the patient's insurance plan have a 3 day qualifying hospital stay waiver?  No    Lifestyle & Psychosocial Needs:  Social Determinants of Health     Tobacco Use: High Risk (8/7/2023)    Patient History     Smoking Tobacco Use: Some Days     Smokeless Tobacco Use: Former     Passive Exposure: Not on file   Alcohol Use: Unknown (8/27/2020)    AUDIT-C     Frequency of Alcohol Consumption: Not on file     Average Number of Drinks: Patient refused     Frequency of Binge  "Drinking: Not on file   Financial Resource Strain: Not on file   Food Insecurity: Not on file   Transportation Needs: Not on file   Physical Activity: Not on file   Stress: Not on file   Social Connections: Not on file   Intimate Partner Violence: Not on file   Depression: Not at risk (8/27/2020)    PHQ-2     PHQ-2 Score: 0   Housing Stability: Not on file       Functional Status:  Prior to admission patient needed assistance:   Dependent ADLs:: Independent  Dependent IADLs:: Independent       Mental Health Status:  Mental Health Status: No Current Concerns       Chemical Dependency Status:  Chemical Dependency Status: Other (see comment)  Chemical Dependency Management:  (Patient repprted he \"smokes meth once and a while\" and \"meth is only bad for people with mental illness which I dont have\")          Values/Beliefs:  Spiritual, Cultural Beliefs, Catholic Practices, Values that affect care:  (Not discussed at this time)               Additional Information:  Andrew Collier was admitted on 8/7/2023. 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     SW met with patient at bedside to complete CMA. SW introduced self and explained their role. SW verified the patient's PCP and address. Patient reported he continues to live at [facility] where he has his own room. Patient reported at baseline he is independent with ADLs and IADLs. Patient will take public transportation or walk to go places. Patient reported he is unemployed, however declined having financial concerns at this time. 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 Patient reported he \"does meth once and a while\" and \"meth is only bad for people who have mental " "illness which I don't have\" Patient declined his meth use being a concern at this time. Patient requested SW to see if there are a mens size 8 pair of shoes available. SW stated they will check. Patient declined having questions or concerns at this time.    SW will continue to follow for discharge needs.     GERONIMO Vera  [address]   Office: [phone]  Pager: [phone]  [email or web address]        "

## 2023-08-08 NOTE — PLAN OF CARE
Goal Outcome Evaluation:      Plan of Care Reviewed With: patient    Overall Patient Progress: improvingOverall Patient Progress: improving    Outcome Evaluation: Pt pooped ON. No report of continued BM's today. NG removed by surgery during rounds this AM. IVMF discontinued and pt started on CLD. Pt consuming more than CLD, sneaking pudding, esau crackers and other unknown food into room. BG angeles to 429. Endo consulted and started pt on daily lantus and restarted PTA Januvia. Possible discharge to home tomorrow pending medical stability

## 2023-08-08 NOTE — PROGRESS NOTES
"    [facility]    Progress Note - Emergency General Surgery Service       Date of Admission:  8/6/2023    Assessment & Plan: Surgery   Andrew Collier is a 57 year old male with PMH of HIV, Type 2 DM, perforated appendicitis s/p appendectomy, substance abuse, and recurrent SBO admitted 8/6/23 for abd pain with CT 8/7 showing SBO with smooth tapering transition distally and no suspicious transition point. NGT placed for bowel decompression. Reports having a BM this morning, and denies having any abdominal pain. NGT removed at bedside.     - ADAT; NGT removed during rounds this morning  - EGS will sign off at this time, thank you for allowing our team to participate in this patient's care.        Diet: Advance Diet as Tolerated: Clear Liquid Diet  Diet    DVT Prophylaxis: Defer to primary service  Potts Catheter: Not present  Lines: None     Drains: None     Cardiac Monitoring: None  Code Status: Full Code      Clinically Significant Risk Factors        # Hypokalemia: Lowest K = 2.2 mmol/L in last 2 days, will replace as needed  # Hypernatremia: Highest Na = 147 mmol/L in last 2 days, will monitor as appropriate  # Hypocalcemia: Lowest Ca = 7.7 mg/dL in last 2 days, will monitor and replace as appropriate  # Hypercalcemia: Highest Ca = 10.2 mg/dL in last 2 days, will monitor as appropriate              # DMII: A1C = 13.3 % (Ref range: <5.7 %) within past 6 months  , PRESENT ON ADMISSION    # Overweight: Estimated body mass index is 25.75 kg/m  as calculated from the following:    Height as of this encounter: 1.626 m (5' 4\").    Weight as of this encounter: 68 kg (150 lb)., PRESENT ON ADMISSION          Disposition Plan      Expected Discharge Date: 08/09/2023      Destination: shelter           The patient's care was discussed with the chief Dr. Rogers who discussed with staff.    Chrissy Cook MD  [facility]  Non-urgent messages: Securely message with [employer] (more info)  Text page via [employer] Paging/Directory     ______________________________________________________________________    Interval History   No acute events overnight, afebrile. Passed BM this morning and denies having abd pain.     Physical Exam   Vital Signs: Temp: 98.2  F (36.8  C) Temp src: Oral BP: 129/74 Pulse: 80   Resp: 16 SpO2: 95 % O2 Device: None (Room air)    Weight: 150 lbs 0 oz  Intake/Output Summary (Last 24 hours) at 8/7/2023 1702  Last data filed at 8/7/2023 1500  Gross per 24 hour   Intake 1647.92 ml   Output 500 ml   Net 1147.92 ml     General Appearance:  Appears comfortable, alert, conversational  Respiratory: No increased work of breathing, no retractions, saturating well on room air  Cardiovascular: Regular rate and rhythm  GI: non-distended, non-tender, and soft  Skin: warm, dry, no rashes    Data     I have personally reviewed the following data over the past 24 hrs:    N/A  \   N/A   / N/A     134 (L) 100 8.3 /  214 (H)   3.5 24 0.64 (L) \       Imaging results reviewed over the past 24 hrs:   Recent Results (from the past 24 hour(s))   XR Gastrografin Challenge    Narrative    Exam: XR GASTROGRAFIN CHALLENGE, 8/7/2023 10:20 PM    History: mid SBO without suspicious transition    Comparison: Abdominal x-ray 8/7/2023, abdominal CT 8/6/2023    Findings:   Single AP view of the abdomen taken 8 hours after ingestion of initial  Gastrografin bolus. Enteric tube tip projecting over the distal  stomach.    Large bowel and rectum contain enteric contrast.      Impression    Impression:   Contrast fills the large bowel and rectum. No evidence for small bowel  obstruction.       I have personally reviewed the examination and initial interpretation  and I agree with the findings.    MARY RAMEY MD         SYSTEM ID:  [id number]

## 2023-08-08 NOTE — PLAN OF CARE
Goal Outcome Evaluation:      Plan of Care Reviewed With: patient    Overall Patient Progress: improvingOverall Patient Progress: improving    Outcome Evaluation: Patient AxOx4, VSS on RA. Patient NG to LIS with good output. Gastrograffin study completed. Xray completed. Patient reported 2 loose bowel movements overnight. voiding without difficulty. LR infusing at 125ml/hr. IV dilaudid given 2x for abd pain. patient refused skin check. Verbalized Anxiety to have a diet and leave the hospital. Continue plan of care.

## 2023-08-08 NOTE — DISCHARGE INSTRUCTIONS
You were admitted to the hospital for bowel obstruction, and have improved with bowel rest and NG tube decompression.  You were seen by the diabetes specialist ***  You should take over the counter Miralax and senna-docusate daily to avoid constipation, and administer an enema or suppository if you have not had a bowel movement in over 2 days.   Return to the hospital for constipation that continues despite bowel medications, or worsening abdominal pain.       Social Work confirmed there is access to refrigeration for medications at [facility].  If something changes, you can reference the information below about storage outside of regrigeration.    HOW TO STORE YOUR OPENED LANTUS SOLOSTAR PEN  Aer its first use, don t refrigerate the Lantus SoloStar pen. Keep it at room temperature only  (below 86 F) for 28 days.  After 28 days, throw your opened Lantus pen away--even if it still has insulin in it.  Keep Lantus away from direct heat and light.  Don t leave Lantus in a hot car, in direct sun, or in a freezing car. If Lantus gets too hot or freezes, it will have to be thrown away.    IP Diabetes Management Team Discharge Instructions    Glucose Control Regimen:      -Do not take the Metformin if it gives you unmanageable diarrhea      -Glargine (Lantus, or Basaglar, per insurance coverage) 38 units daily in the morning (0800) - try to take at the same time each day.    -Januvia 100 mg each day in the AM   -Glimepiride 8 mg - this was started instead of the meal insulin since you don't want to take that many injections AND you have taken it in the past.  Take 8 mg when you are eating well and regularly, take 4 mg when you have less access to food and eating less.    -The Bydureon was not started because of your rashes and hx of rashes from Trulicity in the past that you reported were bothersome    -Close follow up in clinic so if you change your mind, any of this plan can be adjusted for you.     -follow up with  [facility] in 1-2 weeks after discharge (appointment request sent to clinic coordinator on 08/15/23)    Blood Glucose Checks: -blood glucose monitoring three times daily before meals and at bedtime - keep a good journal so when you follow up in the clinic they are able to assist and adjust meds    Endocrinology Outpatient follow up: An appointment request was sent to the [facility] coordinator to schedule your outpatient diabetes appointment 1-2 weeks from discharge. Please call the clinic at [phone] if you do not have an appointment scheduled on discharge, or if you have non-urgent questions regarding your blood sugars or insulin.     Call to schedule a diabetes clinic appt (you have seen Lenora Haley PA-C in the past) [phone]    If you have urgent questions or concerns regarding your blood sugars or insulin, you may contact [phone] (the main hospital ). Ask to speak with the endocrinologist on call.      Thank you for letting the Diabetes Management Team be involved in your care!  Dalila Mackenzie, APRN CNP, BC-ADM
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
## 2023-08-08 NOTE — CONSULTS
"Diabetes/Hyperglycemia Management Consult    Reason for consult type 2 diabetes, A1C 13 despite 3 oral antihyperglycemics, recurrent SBO  Consult requested by: Aguila Painting PA-C  History of Present Illness Andrew Collier is a 57 year old w/ PMH recurrent SBO of unclear etiology, hx of perforated appendicitis s/p appendectomy, HIV infection c/b hx HSV and hx CNS toxoplasmosis, poorly controlled T2DM, housing insecurity, and substance abuse who was admitted sudden onset abdominal pain and vomiting found to have recurrent SBO on 8/7/2023. This was medically managed and patient has now had BM and NGT has been removed.  He has resumed eating and is aiming for discharge tomorrow.   RN notes that patient was consuming carbohydrates (esau crackers and juice) ahead of the new clear liquid diet order -- overnight intake volume not known, but significant enough to spike 125 mg/dL.          Known to the inpatient diabetes management service from several previous admissions, last seen in May of this year. At that time the tentative discharge plan was for Lantus 12 units daily (or try metformin XR if pt unwilling to take the Lantus) and Januvia 100 mg daily.  He had trialed 12-14 units glargine (plus prandial aspart) during the hospitalization. Per 5/8/2023, discharge summary, \"Pt was unable to take advantage of obtaining insulin ([employer]) at discharge due to elopement. \"          Scheduling of a hospital follow up appt in clinic was unsucessful-- pt didn't answer.      In 2021, Andrew reported glargine gave him abdominal pain but that he tolerated detemir.  In May, patient noted lispro insulin gives him a rash at site of injection (and in 2021, he said it caused peripheral edema). Last admission Andrew reported lantus and novolog are okay to give.    This admission, he told his RN that insulin causes obstructions.        Andrew very reluctant to engage in interview.  Stating he needs to just sleep.  RN " shares that he is often complaining of need to be left alone.  On my second visit he said he won't take metformin XR because it gives him diarrhea.  Says he will take once daily Lantus.      Recent Labs   Lab 08/08/23  1153 08/08/23  0746 08/08/23  0707 08/08/23  0409 08/07/23  2352 08/07/23 2002   * 362* 252* 227* 234* 216*         Diabetes Type: 2  , in setting of HIV  Diabetes Duration: 7 years  Usual Diabetes Regimen:   Unknown BG monitoring frequency-- may not have had a working meter recently  Undescribed diet  Januvia 100 mg daily-- unknown frequency of taking     5/2023--Andrew reports he had histoically been on insulin but stopped using this due to being homeless and not have refrigerator access. He was also on metformin but noted significant GI disturbance and bowel incontinence due to diarrhea.   --> Metformin XR test claim was run.       Glimepride has been listed on medication list but patient reported he has never taken this.      Ability to [place] Prescribed Regimen: many barriers historically, but now in living situation where safe insulin storage should NOT be one of the barriers.  Diabetes Control:   Lab Results   Component Value Date    A1C 13.3 08/06/2023    A1C 11.5 04/18/2023    A1C 12.8 12/07/2022    A1C 12.8 11/17/2022    A1C 11.2 02/18/2022    A1C 9.7 11/11/2020    A1C 8.2 09/25/2020    A1C 8.2 08/27/2020    A1C 11.6 02/11/2020    A1C 12.3 01/07/2020     Diabetes Complications: denies retinopathy, + peripheral neuropathy,  History of DKA: denies  Able to Detect Hypoglycemia: yes, historically  Usual Diabetes Care Provider: Patient last seen outpatient for diabetes by [facility] and was taking 1500 mg metformin XR only (1/25/2023) .  Primary care provider listted as Dr. Reynoso  Factors Impacting Glucose Control: resumption of eating      Review of Systems  10 point ROS completed with pertinent positives and negatives noted in the HPI    Past medical, family and social  histories are reviewed and updated.    Past Medical History  Past Medical History:   Diagnosis Date    Acute appendicitis with localized peritonitis 1/31/2018    AIDS (H)     Allergic rhinitis due to other allergen     DNS    Anal dysplasia     Chronic abdominal pain     CNS toxoplasmosis (H)     COVID-19 1/11/2022    Diabetes type 2, controlled (H)     GERD (gastroesophageal reflux disease)     Herpes zoster 9/23/2016    History of seizure     History of substance abuse (H)     HIV (human immunodeficiency virus infection) (H)     HLD (hyperlipidemia)     Lung nodules     Periungual wart     Pneumonia 1/6/2019    PTSD (post-traumatic stress disorder)     Sleep apnea     doesn't use CPAP       Family History  Family History   Problem Relation Age of Onset    Diabetes Brother     Diabetes Father     Alzheimer Disease Father     Unknown/Adopted Mother     Diabetes Paternal Grandfather     Cancer No family hx of         no skin cancer    Skin Cancer No family hx of         no famiy hx of skin cancer    Glaucoma No family hx of     Macular Degeneration No family hx of        Social History  Social History     Socioeconomic History    Marital status: Single     Spouse name: None    Number of children: None    Years of education: None    Highest education level: None   Occupational History    Occupation:      Comment: 5 years; temp agency    Occupation: Unemployed   Tobacco Use    Smoking status: Some Days     Packs/day: 0.25     Years: 40.00     Pack years: 10.00     Types: Cigarettes    Smokeless tobacco: Former   Substance and Sexual Activity    Alcohol use: No     Alcohol/week: 0.0 standard drinks of alcohol     Comment: Last etoh in 2007    Drug use: Not Currently     Types: Marijuana, Methamphetamines    Sexual activity: Not Currently     Partners: Female, Male     Comment: Last sexual activity 2008   Social History Narrative    Born in [facility].  Came to the [place] in 1993.  Last traveled to visit  "family in 2008.        1/7/2019 - Homeless. Working with a  at [employer] trying to get housing. Sexually active with two partners recently using condoms 100% of the time. Smokes occasionally, not for the last 4 weeks.        1/7/2020 at [facility] since 1/1/2020. 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  Care established with ID and endocrine     Reported to  today:  [facility] where he has his own room.   Patient reported he is unemployed, however declined having financial concerns at this time       Physical Exam  /78 (BP Location: Right arm)   Pulse 78   Temp 97.6  F (36.4  C) (Oral)   Resp 16   Ht 1.626 m (5' 4\")   Wt 68 kg (150 lb)   SpO2 98%   BMI 25.75 kg/m      General:  resting in bed, in no distress.   HEENT: NC/AT, PER and anicteric, non-injected, oral mucous membranes moist.   Lungs: unlabored respiration, no cough  ABD: rounded  Skin: dry, no obvious lesions  MSK:  fluid movement of all extremities  Lymp:  no LE edema   Mental status:arouse to voice, speech clear, not receptive to orientation questions  Psych: irritable and not wanting to engage    Laboratory  Recent Labs   Lab Test 08/08/23  1153 08/08/23  0746 08/08/23  0707 08/07/23  1246 08/07/23  1201 08/07/23  0620 08/07/23  0037   NA  --   --  134*  --   --   --  138   POTASSIUM  --   --  3.5  --  4.0  --  3.1*   CHLORIDE  --   --  100  --   --   --  105   CO2  --   --  24  --   --   --  22   ANIONGAP  --   --  10  --   --   --  11   * 362* 252*   < >  --    < > 324*   BUN  --   --  8.3  --   --   --  12.8   CR  --   --  0.64*  --   --   --  0.50*   LINDA  --   --  9.1  --   --   --  7.7*    < > = values in this interval not displayed.     CBC RESULTS:   Recent Labs   Lab Test 08/07/23  0647   WBC 7.0   RBC 4.64   HGB 13.3   HCT 40.2   MCV 87   MCH 28.7   MCHC 33.1   RDW 13.6          Liver Function Studies -   Recent Labs   Lab Test 08/06/23  2243   PROTTOTAL 8.0   ALBUMIN 4.5   BILITOTAL 0.5   ALKPHOS 192*   AST 27 "   ALT 14       Active Medications  Current Facility-Administered Medications   Medication    bictegravir-emtricitabine-tenofovir (BIKTARVY) -25 MG per tablet 1 tablet    buPROPion (WELLBUTRIN XL) 24 hr tablet 150 mg    glucose gel 15-30 g    Or    dextrose 50 % injection 25-50 mL    Or    glucagon injection 1 mg    glucose gel 15-30 g    Or    dextrose 50 % injection 25-50 mL    Or    glucagon injection 1 mg    famotidine (PEPCID) tablet 40 mg    HYDROmorphone (PF) (DILAUDID) injection 0.5 mg    insulin aspart (NovoLOG) injection (RAPID ACTING)    insulin aspart (NovoLOG) injection (RAPID ACTING)    insulin aspart (NovoLOG) injection (RAPID ACTING)    ketorolac (TORADOL) injection 30 mg    lidocaine (LMX4) cream    lidocaine 1 % 0.1-1 mL    melatonin tablet 1 mg    naloxone (NARCAN) injection 0.2 mg    Or    naloxone (NARCAN) injection 0.4 mg    Or    naloxone (NARCAN) injection 0.2 mg    Or    naloxone (NARCAN) injection 0.4 mg    ondansetron (ZOFRAN ODT) ODT tab 4 mg    Or    ondansetron (ZOFRAN) injection 4 mg    polyethylene glycol (MIRALAX) Packet 17 g    senna-docusate (SENOKOT-S/PERICOLACE) 8.6-50 MG per tablet 1 tablet    sodium chloride (PF) 0.9% PF flush 3 mL    sodium chloride (PF) 0.9% PF flush 3 mL     No current outpatient medications on file.       Current Diet  Orders Placed This Encounter      Advance Diet as Tolerated: Clear Liquid Diet        Assessment  #uncontrolled type 2 diabetes with unknown usual use of antihyperglycemics      Plan    - start glargine 12 units now, q24h ASAP  - start sitagliptin 100 mg now, then daily AM  - will not start metformin since pt reports he cannot tolerate it (diarrhea) and won't take it  - will stop prandial aspart 1 unit pr 9 grams (not given yet) since pt has not been reporting the PO intake to nursing and he is not interested in utilizing multiple daily injections at home  - aspart medium resistance correction AC, HS and will add 0200 correction  -  hypoglycemia protocol  - education needs identified: will need review of the discharge diabetes treatment plan.  Has practiced insulin admin in the past.  - prescriptions needed on discharge: TBD, pt would not share what medications he has at home  - outpatient follow up: perhaps he would meet with his MTM PharmD again? Along with primary care.  He did not respond to outreach from Endocrine and diabetes clinic      Radhabrain Garcia VIKASH CNS     Diabetes Management Team job code: [phone]    80 minutes spent on the date of the encounter doing chart review, history and exam, coordinating care/communication, documentation and further activities per the note.    To contact Endocrine Diabetes service:   From 8AM-4PM: page inpatient diabetes provider that is following the patient that day (see filed or incomplete progress notes/consult notes).  If uncertain of provider assignment: page job code [phone] or review in [facility].  For questions or updates from 4PM-8AM: page the diabetes job code for on call fellow: [phone]    Please notify inpatient diabetes service if changes are planned to steroids,enteral feeding,parenteral feeding, or if procedures are planned requiring prolonged NPO status.

## 2023-08-09 LAB
ANION GAP SERPL CALCULATED.3IONS-SCNC: 12 MMOL/L (ref 7–15)
BUN SERPL-MCNC: 7.2 MG/DL (ref 8–23)
CALCIUM SERPL-MCNC: 9.2 MG/DL (ref 8.6–10)
CHLORIDE SERPL-SCNC: 97 MMOL/L (ref 98–107)
CREAT SERPL-MCNC: 0.65 MG/DL (ref 0.67–1.17)
DEPRECATED HCO3 PLAS-SCNC: 24 MMOL/L (ref 22–29)
ERYTHROCYTE [DISTWIDTH] IN BLOOD BY AUTOMATED COUNT: 13.6 % (ref 10–15)
GFR SERPL CREATININE-BSD FRML MDRD: >90 ML/MIN/1.73M2
GLUCOSE BLDC GLUCOMTR-MCNC: 323 MG/DL (ref 70–99)
GLUCOSE BLDC GLUCOMTR-MCNC: 335 MG/DL (ref 70–99)
GLUCOSE BLDC GLUCOMTR-MCNC: 357 MG/DL (ref 70–99)
GLUCOSE BLDC GLUCOMTR-MCNC: 389 MG/DL (ref 70–99)
GLUCOSE BLDC GLUCOMTR-MCNC: 405 MG/DL (ref 70–99)
GLUCOSE BLDC GLUCOMTR-MCNC: 406 MG/DL (ref 70–99)
GLUCOSE BLDC GLUCOMTR-MCNC: 422 MG/DL (ref 70–99)
GLUCOSE SERPL-MCNC: 447 MG/DL (ref 70–99)
HCT VFR BLD AUTO: 39.4 % (ref 40–53)
HGB BLD-MCNC: 12.9 G/DL (ref 13.3–17.7)
MCH RBC QN AUTO: 27.8 PG (ref 26.5–33)
MCHC RBC AUTO-ENTMCNC: 32.7 G/DL (ref 31.5–36.5)
MCV RBC AUTO: 85 FL (ref 78–100)
PHOSPHATE SERPL-MCNC: 3.8 MG/DL (ref 2.5–4.5)
PLATELET # BLD AUTO: 320 10E3/UL (ref 150–450)
POTASSIUM SERPL-SCNC: 3.8 MMOL/L (ref 3.4–5.3)
RBC # BLD AUTO: 4.64 10E6/UL (ref 4.4–5.9)
SODIUM SERPL-SCNC: 133 MMOL/L (ref 136–145)
WBC # BLD AUTO: 6.2 10E3/UL (ref 4–11)

## 2023-08-09 PROCEDURE — 84100 ASSAY OF PHOSPHORUS: CPT | Performed by: STUDENT IN AN ORGANIZED HEALTH CARE EDUCATION/TRAINING PROGRAM

## 2023-08-09 PROCEDURE — 250N000013 HC RX MED GY IP 250 OP 250 PS 637: Performed by: STUDENT IN AN ORGANIZED HEALTH CARE EDUCATION/TRAINING PROGRAM

## 2023-08-09 PROCEDURE — 99232 SBSQ HOSP IP/OBS MODERATE 35: CPT | Mod: FS | Performed by: STUDENT IN AN ORGANIZED HEALTH CARE EDUCATION/TRAINING PROGRAM

## 2023-08-09 PROCEDURE — 99233 SBSQ HOSP IP/OBS HIGH 50: CPT | Performed by: CLINICAL NURSE SPECIALIST

## 2023-08-09 PROCEDURE — 250N000013 HC RX MED GY IP 250 OP 250 PS 637

## 2023-08-09 PROCEDURE — 120N000002 HC R&B MED SURG/OB UMMC

## 2023-08-09 PROCEDURE — 250N000013 HC RX MED GY IP 250 OP 250 PS 637: Performed by: CLINICAL NURSE SPECIALIST

## 2023-08-09 PROCEDURE — 85027 COMPLETE CBC AUTOMATED: CPT | Performed by: PHYSICIAN ASSISTANT

## 2023-08-09 PROCEDURE — 80048 BASIC METABOLIC PNL TOTAL CA: CPT | Performed by: PHYSICIAN ASSISTANT

## 2023-08-09 PROCEDURE — 36415 COLL VENOUS BLD VENIPUNCTURE: CPT | Performed by: PHYSICIAN ASSISTANT

## 2023-08-09 PROCEDURE — 250N000013 HC RX MED GY IP 250 OP 250 PS 637: Performed by: PHYSICIAN ASSISTANT

## 2023-08-09 RX ORDER — POTASSIUM CHLORIDE 750 MG/1
20 TABLET, EXTENDED RELEASE ORAL ONCE
Status: COMPLETED | OUTPATIENT
Start: 2023-08-09 | End: 2023-08-09

## 2023-08-09 RX ADMIN — POLYETHYLENE GLYCOL 3350 17 G: 17 POWDER, FOR SOLUTION ORAL at 07:49

## 2023-08-09 RX ADMIN — FAMOTIDINE 40 MG: 20 TABLET ORAL at 07:49

## 2023-08-09 RX ADMIN — SENNOSIDES AND DOCUSATE SODIUM 1 TABLET: 50; 8.6 TABLET ORAL at 07:49

## 2023-08-09 RX ADMIN — BICTEGRAVIR SODIUM, EMTRICITABINE, AND TENOFOVIR ALAFENAMIDE FUMARATE 1 TABLET: 50; 200; 25 TABLET ORAL at 07:48

## 2023-08-09 RX ADMIN — POTASSIUM CHLORIDE 20 MEQ: 750 TABLET, EXTENDED RELEASE ORAL at 08:58

## 2023-08-09 RX ADMIN — SITAGLIPTIN 100 MG: 100 TABLET, FILM COATED ORAL at 07:49

## 2023-08-09 ASSESSMENT — ACTIVITIES OF DAILY LIVING (ADL)
ADLS_ACUITY_SCORE: 22

## 2023-08-09 NOTE — PLAN OF CARE
"Goal Outcome Evaluation:      Plan of Care Reviewed With: patient    Overall Patient Progress: improvingOverall Patient Progress: improving    Outcome Evaluation: Pt reports passing gas, no BM today. Advanced to MOD CHO diet. Endo following. Pt had carb cov at 1U per 12gm CHO added, remains on SSI as well. Pt is supposed to discharge home tomorrow pending medical stability. DM plan for discharge is daily lantus injections and weekly byurdeon injections. Pt was educated by writer on administration instructions. Pt needs reinforcement, pt did watch video but could not stop laughing at the first instruction which was to shake the insulin. Pt revealed his penis to writer and started stroking it making fun of the \"shaking\" motion. Writer indicated that was not appropriate and stepped out of the room after explaining the importance of pt respecting sexual boundaries with staff.      "

## 2023-08-09 NOTE — PLAN OF CARE
Goal Outcome Evaluation:      Plan of Care Reviewed With: patient    Overall Patient Progress: improvingOverall Patient Progress: improving    Outcome Evaluation: Patient reported very small BM, passing gas, active BS. BG elevated overnight, Patient not cooperative with CLD, kept sneaking esau crackers and pudding overnight, Repeatedly reminded patient of diet with non cooperation. BG up to 422 then 406 after correctional dose given. Team aware, to recheck this morning. Did not request pain medication overnight. Plan to possible d/c today.

## 2023-08-09 NOTE — PROGRESS NOTES
Paged ~3am that patient received 5U aspart at 1:54am for a BGS of 422 and that it was only down to 406 at 2:41 and if patient should get more aspart.     Plan to wait to allow aspart to have peak effect and recheck BGS in 1-2 hours and reassess need for further insulin

## 2023-08-09 NOTE — PROGRESS NOTES
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    Though sitagliptin not at steady state trough concentration yet, seems unlikely it has sufficient power to control post-prandial glucose      - discuss GLP-1 RA with patient-- has coverage for daily or weekly formulation-- prefers weekly, so this would start on discharge following  from pharmacy.  While remains inpatient today, resume prandial aspart at slightly lower dose-- 1 units per 12 grams-- to account for potential effect of sitagliptin  - sitagliptin 100 mg daily (can continue through morning of discharge, then stop in anticipation of the GLP-1 RA starting the afternoon/evening of discharge)  - glargine 12 units daily, currently q24h 1500--> pt's preferred time of day is evening. Moved to 1800 for tonight, then can be every evening on discharge orders  - aspart medium correction AC/HS, 0200  - (no metformin, due to complaint of diarrhea.  No Glimepiride as was not ever taking this)  - hypoglycemia protocol  - education needs GLP-1 RA handout reviewed verbally and provided to patient.  He may also have counseling in discharge pharmacy when he picks up the prescription.  Will update diabetes educator on new treatment plan       Outpatient diabetes follow up:   Pt did not respond to the last outreach from diabetes clinic scheduling-- states doesn't have a phone of his own.  Wants to call and make follow up w/ Lenora Haley.    Plan discussed with patient, bedside RN, and primary team via messaging. And SW (confirmed access to refrigeration).  Diabetes  service will sign off.  Please page if pt's needs change/plan needs reevaluation. Job code [id number]      Pasted into AVS:  Diabetes management Recommendations for discharge:  Stop metformin because it gives you unmanageable diarrhea  Stop sitagliptin (Januvia) because you will start Bydureon  Start Bydureon B-Cise 2 mg every week on Thursdays  Take Lantus 12 units every evening  Check glucose twice per day: once when you wake up in the morning and once in the evening  Call to schedule a diabetes clinic appt (you have seen Lenora Haley PA-C in the past) [phone]    [employer] confirmed there is access to refrigeration for medications at [facility].  If something changes, you can reference the information below about storage outside of regrigeration.    **How to Store BYDUREON BCISE Autoinjector   Store the autoinjector flat in the refrigerator between 36 F to 46 F (2 C to 8 C).   Each autoinjector can be kept at room temperature not to exceed 86 F (30 C) for no more than a total of 4 weeks, if needed.   Store in the packaging provided to protect from light until you are ready to prepare and use your  dose.    HOW TO STORE YOUR OPENED LANTUS SOLOSTAR PEN  Aer its first use, don t refrigerate the Lantus SoloStar pen. Keep it at room temperature only  (below 86 F) for 28 days.  After 28 days, throw your opened Lantus pen away--even if it still has insulin in it.  Keep Lantus away from direct heat and light.  Don t leave Lantus in a hot car, in direct sun, or in a freezing car. If Lantus gets too hot or freezes, it will have to be thrown away.           Interval History:     The last 24 hours progress and nursing notes reviewed.  No stool recorded  Discharge expected today--> shifted to tomorrow  RN notes indicate pt is consuming clear liquids, PLUS puddings and crackers.  It is notable that the glucose nubia occurred after pt had been trying to nap for a few hours (and was presumably not eating)              In  February, patient stated interest in a GLP-1 RA.  Had unstable housing at that time and safe storage was considered a barrier.  Has coverage at zero copay, per consult below.    Date of Service: 2/20/2023  8:37 AM  Creation Time: 2/20/2023  8:36 AM  Consult Orders   Pharmacy Liaison for Medication Coverage [[id number]] ordered by Esther Mauro MD at 02/19/23 7512          Summary: GLP1 RA Rx coverage     Discharge Pharmacy Test Claim     Patient's [place] Medicaid plan covers byureon bcise, byetta, and victoza with $0 copay.     Test Claim Copay   bydureon bcise 0.00   byetta 0.00   victoza 0.00             Today, Andrew agreed to engage more fully in conversation.  He was not taking any diabetes medication routinely.  He feels very low energy and has poor activity tolerance. Also complains of LE pain related to neuropathy.  Advised that improved glucose control can give him more energy, prevent worsening neurop.  And that insulin would not cause bowel obstruction.  And that GLP 1-RA slows gastric emptying.    He was counseled on the cautions for GLP-1 RA and the side effects.    He states that at [facility] he does not have his own refrigerator.  He said he could  one month's supply Lantus and Bydureon.  Unknown if pharmacy would break a box of pens so as not to waste the Lantus that would not be used within 28 days.    Recent Labs   Lab 08/09/23  0708 08/09/23  0241 08/09/23  0148 08/08/23  2200 08/08/23  1740 08/08/23  1447   * 406* 422* 353* 120* 214*           Nutrition:     Orders Placed This Encounter      Advance Diet as Tolerated: Clear Liquid Diet      Diet    Supplements:         PTA Regimen:   Has been prescribed glimepiride-- but may never have taken  Was prescribed metformin XR 2 gm daily-- but states this gives unmanageable diarrhea  Prescribed sitaglitpin 100 mg-- unknown if taking daily  Not monitoring glucose-- lost meter?          Review of Systems:   See interval hx           "Medications:   no steroid         Physical Exam:     Gen: resting in bed, arouses to voice, in NAD   HEENT: NC/AT, hearing intact to conversational volume  Resp: Unlabored  Neuro: oriented x3, unclear if recall of past diabetes hx is accurate  Psych: calm mood  /80 (BP Location: Right arm)   Pulse 86   Temp 98.1  F (36.7  C) (Oral)   Resp 16   Ht 1.626 m (5' 4\")   Wt 68 kg (150 lb)   SpO2 97%   BMI 25.75 kg/m               Data:     Lab Results   Component Value Date    A1C 13.3 08/06/2023    A1C 11.5 04/18/2023    A1C 12.8 12/07/2022    A1C 12.8 11/17/2022    A1C 11.2 02/18/2022    A1C 9.7 11/11/2020    A1C 8.2 09/25/2020    A1C 8.2 08/27/2020    A1C 11.6 02/11/2020    A1C 12.3 01/07/2020              CBC RESULTS:   Recent Labs   Lab Test 08/09/23  0659   WBC 6.2   RBC 4.64   HGB 12.9*   HCT 39.4*   MCV 85   MCH 27.8   MCHC 32.7   RDW 13.6        Recent Labs   Lab Test 08/09/23  0708 08/09/23  0241 08/08/23  0746 08/08/23  0707 08/07/23  1246 08/07/23  1201 08/07/23  0620 08/07/23  0037   NA  --   --   --  134*  --   --   --  138   POTASSIUM  --   --   --  3.5  --  4.0  --  3.1*   CHLORIDE  --   --   --  100  --   --   --  105   CO2  --   --   --  24  --   --   --  22   ANIONGAP  --   --   --  10  --   --   --  11   * 406*   < > 252*   < >  --    < > 324*   BUN  --   --   --  8.3  --   --   --  12.8   CR  --   --   --  0.64*  --   --   --  0.50*   LINDA  --   --   --  9.1  --   --   --  7.7*    < > = values in this interval not displayed.     Liver Function Studies -   Recent Labs   Lab Test 08/06/23 2243   PROTTOTAL 8.0   ALBUMIN 4.5   BILITOTAL 0.5   ALKPHOS 192*   AST 27   ALT 14     Lab Results   Component Value Date    INR 0.91 08/06/2023    INR 1.00 03/28/2023    INR 0.98 02/06/2023    INR 0.95 12/23/2022    INR 0.97 12/17/2022    INR 1.07 12/08/2022    INR 1.44 06/05/2022                             No results found for: FATMATAP, COMCBC      55 minutes spent on the date of the " encounter doing chart review, history and exam, coordinating care/communication, documentation and further activities per the note.          Radha Garcia APRN CNS   To contact Endocrine Diabetes service:   From 8AM-4PM: page inpatient diabetes provider that is following the patient that day (see filed or incomplete progress notes/consult notes).  If uncertain of provider assignment: page job code [phone].  For questions or updates from 4PM-8AM: page the diabetes job code for on call fellow: [phone]    Please notify inpatient diabetes service if changes are planned that will impact glycemia, such as changes to steroids, enteral feeding, parenteral feeding, dextrose fluids or procedures requiring prolonged NPO status.

## 2023-08-09 NOTE — PROGRESS NOTES
[facility]    Medicine Progress Note - Hospitalist Service, GOLD TEAM 6    Date of Admission:  8/6/2023      Assessment & Plan  Andrew Collier was admitted on 8/7/2023. 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    TODAY   Patient overall doing well. Denies any complaints or concerns at this time. Remains hyperglycemic this morning with blood sugar almost 500. Will remain hospitalized for further adjustments in diabetic regimen and still waiting to meet with diabetic educator. Otherwise tolerating diet and doing well.      # Recurrent SBO  # Hypokalemia/hypophosphatemia  - Patient presented with acute onset of L sided abdominal pain, nausea, and NBNB vomiting for 1 day. CT showing duodenal wall thickening and mid small bowel obstruction with smooth tapering transition/no suspicious transition point. Evaluated by gen surg in the ED and agree with medical management. 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 Symptoms improving following NGT decompression.   - Gastrograffin study without evidence of obstruction.   Plan       - full diet  - Miralax, senna at discharge     # T2DM  # Hyperglycemia  # Ketonuria/glucosuria   - Glucose 440 on admit. Low concern for HHS or DKA. Previously poorly controlled with A1c 11.5%, will repeat. Takes metformin and another diabetes pill (presumably Januvia/Sitagliptin). Says he has not started the new diabetes medication he was started on last admit (presumably Glimepiride). Needs new supplies to check blood sugar.  - repeat A1C here 13  "  Plan  - medium dose correctional, with carb coverage  - lantus 12 units daily   - planning to start exenetide at discharge      # HIV  - Unclear who is managing his HIV. Had multiple undetectable viral loads in the past, however last one was 10/2022 and most recent absolute CD4 count is down from prior (330 5/2023 < 526 5/2022). Last ID note found was from 1/2023 and he did not have insurance so had to defer management until insurance obtained again. Follow up due in 4/2023, however no note found. Patient reports taking his Biktarvy. Will get monitoring labs.  Plan  - CD4 count  - HIV viral load  - PTA biktarvy    Diet NPO  DVT Prophylaxis: scds  Code Status: full code     The patient's care was discussed with: attending      Physical Exam    Vital Signs: Temp: 98.1  F (36.7  C) Temp src: Oral BP: 139/80 Pulse: 86     Resp: 16 SpO2: 97 % O2 Device: None (Room air)    Weight: 150 lbs 0 oz    General Appearance: Alert and oriented x3,   HEENT: Anicteric sclera, MMM   Respiratory: Breathing comfortably on room air, lungs CTAB without wheezing or crackles   Cardiovascular: RRR, S1, S2. No murmur noted  GI: Abdomen soft, non-tender with active bowel sounds. No guarding or rebound  Lymph/Hematologic: No peripheral edema, distal pulses palpable   Skin: No rash or jaundice   Musculoskeletal: Moves all extremities   Neurologic: No focal deficits, CN II-XII grossly intact    Data reviewed today     I reviewed all medications, new labs and imaging results over the last 24 hours. Please see discussion of these results in the A/P.    Clinically Significant Risk Factors                        # DMII: A1C = 13.3 % (Ref range: <5.7 %) within past 6 months, PRESENT ON ADMISSION    # Overweight: Estimated body mass index is 25.75 kg/m  as calculated from the following:    Height as of this encounter: 1.626 m (5' 4\").    Weight as of this encounter: 68 kg (150 lb)., PRESENT ON ADMISSION            Data   Recent Labs   Lab " 08/09/23  0708 08/09/23  0241 08/09/23  0148 08/08/23  0746 08/08/23  0707 08/07/23  1246 08/07/23  1201 08/07/23  0805 08/07/23  0647 08/07/23  0620 08/07/23  0037 08/06/23  2325 08/06/23  2243   WBC  --   --   --   --   --   --   --   --  7.0  --   --   --  7.7   HGB  --   --   --   --   --   --   --   --  13.3  --   --  9.5* 14.2   MCV  --   --   --   --   --   --   --   --  87  --   --   --  84   PLT  --   --   --   --   --   --   --   --  301  --   --   --  340   INR  --   --   --   --   --   --   --   --   --   --   --   --  0.91   NA  --   --   --   --  134*  --   --   --   --   --  138 147* 135*   POTASSIUM  --   --   --   --  3.5  --  4.0  --   --   --  3.1* 2.2* 3.9   CHLORIDE  --   --   --   --  100  --   --   --   --   --  105  --  97*   CO2  --   --   --   --  24  --   --   --   --   --  22  --  23   BUN  --   --   --   --  8.3  --   --   --   --   --  12.8  --  15.4   CR  --   --   --   --  0.64*  --   --   --   --   --  0.50*  --  0.58*   ANIONGAP  --   --   --   --  10  --   --   --   --   --  11  --  15   LINDA  --   --   --   --  9.1  --   --   --   --   --  7.7*  --  10.2*   * 406* 422*   < > 252*   < >  --    < >  --    < > 324* 263* 442*   ALBUMIN  --   --   --   --   --   --   --   --   --   --   --   --  4.5   PROTTOTAL  --   --   --   --   --   --   --   --   --   --   --   --  8.0   BILITOTAL  --   --   --   --   --   --   --   --   --   --   --   --  0.5   ALKPHOS  --   --   --   --   --   --   --   --   --   --   --   --  192*   ALT  --   --   --   --   --   --   --   --   --   --   --   --  14   AST  --   --   --   --   --   --   --   --   --   --   --   --  27   LIPASE  --   --   --   --   --   --   --   --   --   --   --   --  44    < > = values in this interval not displayed.       No results found for this or any previous visit (from the past 24 hour(s)).      Billing    Medical Decision Making       60 MINUTES SPENT BY ME on the date of service doing chart review, history, exam,  documentation & further activities per the note.        A[employer]ila [employer], PA-C  Hospitalist Service, GOLD TEAM 6  [facility]  Securely message with [employer] (more info)  Text page via [facility] Paging/Directory

## 2023-08-09 NOTE — PROGRESS NOTES
Patient refused to use a CPAP in the hospital since he doesn't wear one at home.    D/c'd CPAP HS order.       Cristóbal Pal, RT

## 2023-08-10 LAB
ANION GAP SERPL CALCULATED.3IONS-SCNC: 13 MMOL/L (ref 7–15)
BUN SERPL-MCNC: 10.6 MG/DL (ref 8–23)
CALCIUM SERPL-MCNC: 9.6 MG/DL (ref 8.6–10)
CHLORIDE SERPL-SCNC: 98 MMOL/L (ref 98–107)
CREAT SERPL-MCNC: 0.66 MG/DL (ref 0.67–1.17)
DEPRECATED HCO3 PLAS-SCNC: 21 MMOL/L (ref 22–29)
ERYTHROCYTE [DISTWIDTH] IN BLOOD BY AUTOMATED COUNT: 13.2 % (ref 10–15)
GFR SERPL CREATININE-BSD FRML MDRD: >90 ML/MIN/1.73M2
GLUCOSE BLDC GLUCOMTR-MCNC: 258 MG/DL (ref 70–99)
GLUCOSE BLDC GLUCOMTR-MCNC: 319 MG/DL (ref 70–99)
GLUCOSE BLDC GLUCOMTR-MCNC: 321 MG/DL (ref 70–99)
GLUCOSE BLDC GLUCOMTR-MCNC: 349 MG/DL (ref 70–99)
GLUCOSE BLDC GLUCOMTR-MCNC: 417 MG/DL (ref 70–99)
GLUCOSE BLDC GLUCOMTR-MCNC: 488 MG/DL (ref 70–99)
GLUCOSE SERPL-MCNC: 378 MG/DL (ref 70–99)
HCT VFR BLD AUTO: 40.6 % (ref 40–53)
HGB BLD-MCNC: 13.5 G/DL (ref 13.3–17.7)
MCH RBC QN AUTO: 28.2 PG (ref 26.5–33)
MCHC RBC AUTO-ENTMCNC: 33.3 G/DL (ref 31.5–36.5)
MCV RBC AUTO: 85 FL (ref 78–100)
PHOSPHATE SERPL-MCNC: 3.4 MG/DL (ref 2.5–4.5)
PLATELET # BLD AUTO: 306 10E3/UL (ref 150–450)
POTASSIUM SERPL-SCNC: 3.9 MMOL/L (ref 3.4–5.3)
RBC # BLD AUTO: 4.78 10E6/UL (ref 4.4–5.9)
SODIUM SERPL-SCNC: 132 MMOL/L (ref 136–145)
WBC # BLD AUTO: 6.3 10E3/UL (ref 4–11)

## 2023-08-10 PROCEDURE — 84100 ASSAY OF PHOSPHORUS: CPT | Performed by: STUDENT IN AN ORGANIZED HEALTH CARE EDUCATION/TRAINING PROGRAM

## 2023-08-10 PROCEDURE — 36415 COLL VENOUS BLD VENIPUNCTURE: CPT | Performed by: PHYSICIAN ASSISTANT

## 2023-08-10 PROCEDURE — 99232 SBSQ HOSP IP/OBS MODERATE 35: CPT | Mod: FS | Performed by: STUDENT IN AN ORGANIZED HEALTH CARE EDUCATION/TRAINING PROGRAM

## 2023-08-10 PROCEDURE — 250N000013 HC RX MED GY IP 250 OP 250 PS 637

## 2023-08-10 PROCEDURE — 82310 ASSAY OF CALCIUM: CPT | Performed by: PHYSICIAN ASSISTANT

## 2023-08-10 PROCEDURE — 120N000002 HC R&B MED SURG/OB UMMC

## 2023-08-10 PROCEDURE — 250N000013 HC RX MED GY IP 250 OP 250 PS 637: Performed by: CLINICAL NURSE SPECIALIST

## 2023-08-10 PROCEDURE — 85027 COMPLETE CBC AUTOMATED: CPT | Performed by: PHYSICIAN ASSISTANT

## 2023-08-10 PROCEDURE — 99232 SBSQ HOSP IP/OBS MODERATE 35: CPT

## 2023-08-10 RX ADMIN — BUPROPION HYDROCHLORIDE 150 MG: 150 TABLET, FILM COATED, EXTENDED RELEASE ORAL at 08:04

## 2023-08-10 RX ADMIN — BICTEGRAVIR SODIUM, EMTRICITABINE, AND TENOFOVIR ALAFENAMIDE FUMARATE 1 TABLET: 50; 200; 25 TABLET ORAL at 08:05

## 2023-08-10 RX ADMIN — SITAGLIPTIN 100 MG: 100 TABLET, FILM COATED ORAL at 08:04

## 2023-08-10 RX ADMIN — FAMOTIDINE 40 MG: 20 TABLET ORAL at 08:04

## 2023-08-10 ASSESSMENT — ACTIVITIES OF DAILY LIVING (ADL)
ADLS_ACUITY_SCORE: 22

## 2023-08-10 NOTE — CONSULTS
Discharge Pharmacy Test Claim    [facility] accu-chek guide testing supplies and lantus solostar pens. Expected copays listed below.    Test Claim Copay   accu-chek guide 0.00   lantus solostar pens 3.00     Lucy Mario  [facility] Pharmacy Liaison  Ph: [phone] Pager: [phone]   Securely message with the Vocera Web Console (learn more here)

## 2023-08-10 NOTE — CONSULTS
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     Diabetes Educator consulted for poorly controlled T2DM, A1c 13.3% on 8/6/2023. Andrew is known to the IDS from multiple prior admissions.     Andrew present at bedside for education. Andrew had his eyes closed for much of the conversation. He declined much of the education and says he \"already knows what to do.\"     Barriers to diabetes management: housing/access to refrigerator[facility], Andrew states that he needs to ask for permission to use it.     Diabetes Education Provided:  Reviewed Bydureon storage. Provided \"Bydureon instruction for use\" handout. Discussed frequency of administration. Andrew declined further education on Bydureon. He says he \"already watched the video\".   Andrew says he was previously on insulin and checked his BGs and has no further need for education or concerns at this time. Informed Andrew that his insurance covers the Accu Chek Guide glucose meter and that we will fill a prescription for that brand. He says he is familiar with how to use it.    Diabetes Plan Given to Patient:  See Endocrinology's note for diabetes management recommendations for discharge.      Supplies Needed at Discharge:  Accu Chek Guide Glucose Meter   Accu Chek Guide Test Strips   Accu Chek Soft Clix Lancets   Sharps Container   Alcohol Wipes   B-D 4 mm ebony pen needles   Lantus Solostar pens   Bydureon BCise - per Endo       Diabetes education completed. Andrew stated that plan is manageable. All questions answered.       Apolinar Hernandez DNP, [facility]  Diabetes Educator  Pager: " [phone]  Office: [phone]

## 2023-08-10 NOTE — PROGRESS NOTES
IP Diabetes Management  Daily Note         HPI: Andrew Collier is a 57 year old w/ PMH recurrent SBO of unclear etiology, hx of perforated appendicitis s/p appendectomy, HIV infection c/b hx HSV and hx CNS toxoplasmosis, poorly controlled T2DM, housing insecurity, and substance abuse who was admitted sudden onset abdominal pain and vomiting found to have recurrent SBO on 8/7/2023, managed medically.       Assessment:   #uncontrolled type 2 diabetes with unknown usual use of antihyperglycemics     Inpatient Plan:    -Okay to continue sitagliptin 100 mg while inpatient- already received dose today   -Lantus 12 units daily-- increase to 14 units   -Novolog 1:12 CHO coverage with meals and snacks/supplements-- increase to 1:10   -Novolog  medium intensity sliding scale TID AC, hs, 0200 -- increase to iSF 35 sliding scale    -BG monitoring TID AC, HS, 0200   -hypoglycemia protocol   -carb counting protocol    Tentative Discharge Planning: carried over documentation     Stop metformin because it gives you unmanageable diarrhea  Stop sitagliptin (Januvia) because you will start Bydureon  Start Bydureon B-Cise 2 mg every week on Thursdays  Take Lantus 14 units every evening  Check glucose twice per day: once when you wake up in the morning and once in the evening  Call to schedule a diabetes clinic appt (you have seen Lenora Haley PA-C in the past) [phone] (number given to patient today so he call call while he has access to phone at hospital)     Social Work confirmed there is access to refrigeration for medications at [facility].  If something changes, you can reference the information below about storage outside of regrigeration.    Plan discussed with patient, bedside RN, and primary team.      Interval History and Assessment: interval glucose trend reviewed:   Patinet elevated up to 488 this AM- nursing notes consistent snacking. Patient notes he was drinking a large amount of juice last night. I  reinforced carb control for glycemic management and notification for carb coverage- patient intermittently dozing during conversation.     Update:   Further hyperglycemia- only small adjustment to sliding scale given lack of clarity of continued snacking and BG needs          Currently, denies nausea, vomiting, diarrhea or pain.     Labs:8/10  Bicarb:21  Creatinine: 0.66  eGFR: >90  Anion Gap: 13    Current nutritional intake and type: Orders Placed This Encounter      Diet      Consistent Carbohydrate Diet Moderate Consistent Carb (60 g CHO per Meal) Diet      Planned Procedures/surgeries: none  Steroid planning: none  D5W-containing solutions/medications: none    PTA Diabetes Regimen:   Has been prescribed glimepiride-- but may never have taken  Was prescribed metformin XR 2 gm daily-- but states this gives unmanageable diarrhea  Prescribed sitaglitpin 100 mg-- unknown if taking daily  Not monitoring glucose-- lost meter?           Diabetes History:   Type of Diabetes: Type 2 Diabetes Mellitus  Lab Results   Component Value Date    A1C 13.3 08/06/2023    A1C 11.5 04/18/2023    A1C 12.8 12/07/2022    A1C 12.8 11/17/2022    A1C 11.2 02/18/2022    A1C 9.7 11/11/2020    A1C 8.2 09/25/2020    A1C 8.2 08/27/2020    A1C 11.6 02/11/2020    A1C 12.3 01/07/2020              Review of Systems:     The Review of Systems is negative other than noted in the Interval History.           Medications:     Current Facility-Administered Medications   Medication    bictegravir-emtricitabine-tenofovir (BIKTARVY) -25 MG per tablet 1 tablet    buPROPion (WELLBUTRIN XL) 24 hr tablet 150 mg    glucose gel 15-30 g    Or    dextrose 50 % injection 25-50 mL    Or    glucagon injection 1 mg    glucose gel 15-30 g    Or    dextrose 50 % injection 25-50 mL    Or    glucagon injection 1 mg    famotidine (PEPCID) tablet 40 mg    HYDROmorphone (PF) (DILAUDID) injection 0.5 mg    insulin aspart (NovoLOG) injection (RAPID ACTING)    insulin  "aspart (NovoLOG) injection (RAPID ACTING)    insulin aspart (NovoLOG) injection (RAPID ACTING)    insulin aspart (NovoLOG) injection (RAPID ACTING)    insulin glargine (LANTUS PEN) injection 14 Units    ketorolac (TORADOL) injection 30 mg    lidocaine (LMX4) cream    lidocaine 1 % 0.1-1 mL    melatonin tablet 1 mg    naloxone (NARCAN) injection 0.2 mg    Or    naloxone (NARCAN) injection 0.4 mg    Or    naloxone (NARCAN) injection 0.2 mg    Or    naloxone (NARCAN) injection 0.4 mg    ondansetron (ZOFRAN ODT) ODT tab 4 mg    Or    ondansetron (ZOFRAN) injection 4 mg    polyethylene glycol (MIRALAX) Packet 17 g    senna-docusate (SENOKOT-S/PERICOLACE) 8.6-50 MG per tablet 1 tablet    sitagliptin (JANUVIA) tablet 100 mg    sodium chloride (PF) 0.9% PF flush 3 mL    sodium chloride (PF) 0.9% PF flush 3 mL            Physical Exam:    /87 (BP Location: Right arm)   Pulse 89   Temp 98  F (36.7  C) (Oral)   Resp 14   Ht 1.626 m (5' 4\")   Wt 68 kg (150 lb)   SpO2 96%   BMI 25.75 kg/m    General: pleasant, in no distress. Lying in bed   HEENT: normocephalic, atraumatic. Oral mucous membranes moist.   Lungs: unlabored respiration, no cough  ABD: rounded, nondistended appearing  Skin: warm and dry, no obvious lesions- limited assessment   MSK:  moves all extremities  Lymp:  no LE edema   Mental status:  alert, oriented to self, place, time  Psych:  flataffect, calm and  intermittently dozing during conversation.             Data:     Recent Labs   Lab 08/10/23  1122 08/10/23  0950 08/10/23  0730 08/10/23  0146 08/09/23  2133 08/09/23  1638   * 378* 488* 349* 405* 335*     Lab Results   Component Value Date    WBC 6.3 08/10/2023    WBC 6.2 08/09/2023    WBC 7.0 08/07/2023    HGB 13.5 08/10/2023    HGB 12.9 (L) 08/09/2023    HGB 13.3 08/07/2023    HCT 40.6 08/10/2023    HCT 39.4 (L) 08/09/2023    HCT 40.2 08/07/2023    MCV 85 08/10/2023    MCV 85 08/09/2023    MCV 87 08/07/2023     08/10/2023    PLT " 320 08/09/2023     08/07/2023     Lab Results   Component Value Date     (L) 08/10/2023     (L) 08/09/2023     (L) 08/08/2023    POTASSIUM 3.9 08/10/2023    POTASSIUM 3.8 08/09/2023    POTASSIUM 3.5 08/08/2023    CHLORIDE 98 08/10/2023    CHLORIDE 97 (L) 08/09/2023    CHLORIDE 100 08/08/2023    CO2 21 (L) 08/10/2023    CO2 24 08/09/2023    CO2 24 08/08/2023     (H) 08/10/2023     (H) 08/10/2023     (H) 08/10/2023     Lab Results   Component Value Date    BUN 10.6 08/10/2023    BUN 7.2 (L) 08/09/2023    BUN 8.3 08/08/2023     Lab Results   Component Value Date    TSH 0.87 09/25/2020    TSH 2.66 02/11/2020    TSH 3.57 02/15/2018     Lab Results   Component Value Date    AST 27 08/06/2023    AST 11 06/28/2023    AST 17 06/21/2023    ALT 14 08/06/2023    ALT 12 06/28/2023    ALT 16 06/21/2023    ALKPHOS 192 (H) 08/06/2023    ALKPHOS 145 (H) 06/28/2023    ALKPHOS 111 06/21/2023       I spent a total of 35 minutes face to face or coordinating care of Andrew Collier.             Over 50% of my time on the unit was spent counseling the patient and/or coordinating care regarding acute hyperglycemia management.  See note for details.      Contacting the Inpatient Diabetes Team   From 7AM-5PM: page inpatient diabetes provider that is following the patient, or utilize the job code paging system. From 5PM-7AM: page the job code for endocrine fellow on call.     Please use the following job code to reach the Inpatient Diabetes team. Note that you must use an in house phone and that job codes cannot receive text pages.   Dial [phone] (or star-star-star [phone] on the new [employer] telephones), then [phone] to reach the endocrine-diabetes provider on call.    VIKASH Quezada, CNP  Inpatient Diabetes Service   Pager: [phone]

## 2023-08-10 NOTE — PLAN OF CARE
Goal Outcome Evaluation:      Plan of Care Reviewed With: patient    Overall Patient Progress: improvingOverall Patient Progress: improving    Outcome Evaluation: Patient BGs remain elevated, education given to patient about carb coverage, still need reinforcement as patient sneaks snacks without telling staff members. Covered courtesy meal overnight. BGs remained in 300s. No BM ovenright but endorses passing gas. Education needed about new weekly By[place] medication upon discharge.

## 2023-08-11 LAB
ANION GAP SERPL CALCULATED.3IONS-SCNC: 10 MMOL/L (ref 7–15)
BUN SERPL-MCNC: 13.6 MG/DL (ref 8–23)
CALCIUM SERPL-MCNC: 9.4 MG/DL (ref 8.6–10)
CHLORIDE SERPL-SCNC: 97 MMOL/L (ref 98–107)
CREAT SERPL-MCNC: 0.81 MG/DL (ref 0.67–1.17)
DEPRECATED HCO3 PLAS-SCNC: 23 MMOL/L (ref 22–29)
ERYTHROCYTE [DISTWIDTH] IN BLOOD BY AUTOMATED COUNT: 13.2 % (ref 10–15)
GFR SERPL CREATININE-BSD FRML MDRD: >90 ML/MIN/1.73M2
GLUCOSE BLDC GLUCOMTR-MCNC: 244 MG/DL (ref 70–99)
GLUCOSE BLDC GLUCOMTR-MCNC: 286 MG/DL (ref 70–99)
GLUCOSE BLDC GLUCOMTR-MCNC: 400 MG/DL (ref 70–99)
GLUCOSE BLDC GLUCOMTR-MCNC: 424 MG/DL (ref 70–99)
GLUCOSE BLDC GLUCOMTR-MCNC: 440 MG/DL (ref 70–99)
GLUCOSE BLDC GLUCOMTR-MCNC: 454 MG/DL (ref 70–99)
GLUCOSE BLDC GLUCOMTR-MCNC: 460 MG/DL (ref 70–99)
GLUCOSE SERPL-MCNC: 489 MG/DL (ref 70–99)
HCT VFR BLD AUTO: 37.8 % (ref 40–53)
HGB BLD-MCNC: 12.7 G/DL (ref 13.3–17.7)
MCH RBC QN AUTO: 28.5 PG (ref 26.5–33)
MCHC RBC AUTO-ENTMCNC: 33.6 G/DL (ref 31.5–36.5)
MCV RBC AUTO: 85 FL (ref 78–100)
PHOSPHATE SERPL-MCNC: 3.9 MG/DL (ref 2.5–4.5)
PLATELET # BLD AUTO: 304 10E3/UL (ref 150–450)
POTASSIUM SERPL-SCNC: 3.9 MMOL/L (ref 3.4–5.3)
RBC # BLD AUTO: 4.46 10E6/UL (ref 4.4–5.9)
SODIUM SERPL-SCNC: 130 MMOL/L (ref 136–145)
WBC # BLD AUTO: 6.3 10E3/UL (ref 4–11)

## 2023-08-11 PROCEDURE — 84100 ASSAY OF PHOSPHORUS: CPT | Performed by: STUDENT IN AN ORGANIZED HEALTH CARE EDUCATION/TRAINING PROGRAM

## 2023-08-11 PROCEDURE — 85027 COMPLETE CBC AUTOMATED: CPT | Performed by: PHYSICIAN ASSISTANT

## 2023-08-11 PROCEDURE — 250N000013 HC RX MED GY IP 250 OP 250 PS 637: Performed by: CLINICAL NURSE SPECIALIST

## 2023-08-11 PROCEDURE — 99232 SBSQ HOSP IP/OBS MODERATE 35: CPT | Performed by: NURSE PRACTITIONER

## 2023-08-11 PROCEDURE — 36415 COLL VENOUS BLD VENIPUNCTURE: CPT | Performed by: PHYSICIAN ASSISTANT

## 2023-08-11 PROCEDURE — 99207 PR APP CREDIT; MD BILLING SHARED VISIT: CPT | Performed by: PHYSICIAN ASSISTANT

## 2023-08-11 PROCEDURE — 250N000013 HC RX MED GY IP 250 OP 250 PS 637: Performed by: PHYSICIAN ASSISTANT

## 2023-08-11 PROCEDURE — 250N000011 HC RX IP 250 OP 636: Performed by: PHYSICIAN ASSISTANT

## 2023-08-11 PROCEDURE — 99232 SBSQ HOSP IP/OBS MODERATE 35: CPT | Mod: FS | Performed by: STUDENT IN AN ORGANIZED HEALTH CARE EDUCATION/TRAINING PROGRAM

## 2023-08-11 PROCEDURE — 120N000002 HC R&B MED SURG/OB UMMC

## 2023-08-11 PROCEDURE — 82310 ASSAY OF CALCIUM: CPT | Performed by: PHYSICIAN ASSISTANT

## 2023-08-11 PROCEDURE — 250N000012 HC RX MED GY IP 250 OP 636 PS 637: Performed by: NURSE PRACTITIONER

## 2023-08-11 PROCEDURE — 250N000013 HC RX MED GY IP 250 OP 250 PS 637

## 2023-08-11 RX ORDER — CONTAINER,EMPTY
EACH MISCELLANEOUS
Qty: 1 EACH | Refills: 0 | Status: ON HOLD | OUTPATIENT
Start: 2023-08-11 | End: 2023-09-25

## 2023-08-11 RX ORDER — OXYCODONE HYDROCHLORIDE 5 MG/1
5 TABLET ORAL ONCE
Status: COMPLETED | OUTPATIENT
Start: 2023-08-11 | End: 2023-08-11

## 2023-08-11 RX ORDER — BLOOD PRESSURE TEST KIT
KIT MISCELLANEOUS
Qty: 100 EACH | Refills: 0 | Status: ON HOLD | OUTPATIENT
Start: 2023-08-11 | End: 2023-09-25

## 2023-08-11 RX ADMIN — SENNOSIDES AND DOCUSATE SODIUM 1 TABLET: 50; 8.6 TABLET ORAL at 08:03

## 2023-08-11 RX ADMIN — BICTEGRAVIR SODIUM, EMTRICITABINE, AND TENOFOVIR ALAFENAMIDE FUMARATE 1 TABLET: 50; 200; 25 TABLET ORAL at 08:03

## 2023-08-11 RX ADMIN — INSULIN ASPART 1 UNITS: 100 INJECTION, SOLUTION INTRAVENOUS; SUBCUTANEOUS at 22:16

## 2023-08-11 RX ADMIN — FAMOTIDINE 40 MG: 20 TABLET ORAL at 07:58

## 2023-08-11 RX ADMIN — SITAGLIPTIN 100 MG: 100 TABLET, FILM COATED ORAL at 08:03

## 2023-08-11 RX ADMIN — KETOROLAC TROMETHAMINE 30 MG: 15 INJECTION, SOLUTION INTRAMUSCULAR; INTRAVENOUS at 13:06

## 2023-08-11 RX ADMIN — KETOROLAC TROMETHAMINE 30 MG: 15 INJECTION, SOLUTION INTRAMUSCULAR; INTRAVENOUS at 02:42

## 2023-08-11 RX ADMIN — INSULIN GLARGINE 18 UNITS: 100 INJECTION, SOLUTION SUBCUTANEOUS at 21:20

## 2023-08-11 RX ADMIN — SENNOSIDES AND DOCUSATE SODIUM 1 TABLET: 50; 8.6 TABLET ORAL at 21:27

## 2023-08-11 RX ADMIN — BUPROPION HYDROCHLORIDE 150 MG: 150 TABLET, FILM COATED, EXTENDED RELEASE ORAL at 08:03

## 2023-08-11 ASSESSMENT — ACTIVITIES OF DAILY LIVING (ADL)
ADLS_ACUITY_SCORE: 22

## 2023-08-11 NOTE — PLAN OF CARE
Goal Outcome Evaluation:      Plan of Care Reviewed With: patient    Overall Patient Progress: no change    Outcome Evaluation: BG at 0200 was 424. MD Simon Guzman ordered 1x dose of 10u of sliding scale insulin. Patient educated on importance of letting staff know when he is having snacks as we are unable to manage his elevated BG if he is not informing us of intake. BG remains elevated on rechecks 460 as of this morning. MD paged. Patient c/o right shoulder pain 10/10 MD ordered 1x dose of oxycodone, however patient refused oxy and was given prn toradol. PIV saline locked. Pt independent in room. LBM 8/9. Able to make needs known.

## 2023-08-11 NOTE — PLAN OF CARE
Goal Outcome Evaluation:      Plan of Care Reviewed With: patient    Overall Patient Progress: no changeOverall Patient Progress: no change    Outcome Evaluation: 5980-9470: Afebrile. VS unchanged. A/Ox4. Up independently. Sleeping between cares. Blood sugar high overnight and this morning 456. One-time dose of 6units lantus given w/ breakfast. Endocrine also increased sliding scale and carb coverage doses. Blood sugar improved to 286 this afternoon but was up to 440 again before dinner. Covered esau crackers and pudding x1 when brought in by nurse, but pt did not notify staff of any other snacks. IV toradol given x1 for R shoulder pain with relief. Call light in reach. Discharge once blood sugars are stable.

## 2023-08-11 NOTE — PROGRESS NOTES
Diabetes Consult Daily  Progress Note          Assessment/Plan:     HPI: Andrew Collier is a 57 year old w/ PMH recurrent SBO of unclear etiology, hx of perforated appendicitis s/p appendectomy, HIV infection c/b hx HSV and hx CNS toxoplasmosis, poorly controlled T2DM, housing insecurity, and substance abuse who was admitted sudden onset abdominal pain and vomiting found to have recurrent SBO on 8/7/2023, managed medically.         Assessment:   #uncontrolled type 2 diabetes with unknown usual use of antihyperglycemics      Inpatient Plan:    - Give 6 units of Lantus x 1  at 0830 for BGs consistently in the 400's              -Okay to continue sitagliptin 100 mg while inpatient              -Increase Lantus to 18 units daily at supper              -Increase carb coverage to Novolog 1:8 CHO coverage with meals and snacks/supplements              -Novolog  high intensity sliding scale TID AC, hs, 0200               -BG monitoring TID AC, HS, 0200              -hypoglycemia protocol              -carb counting protocol     Tentative Discharge Planning: carried over documentation                Stop metformin because it gives you unmanageable diarrhea  Stop sitagliptin (Januvia) because you will start Bydureon  Start Bydureon B-Cise 2 mg every week on Thursdays  Take Lantus 18 units every evening  Check glucose twice per day: once when you wake up in the morning and once in the evening  Call to schedule a diabetes clinic appt (you have seen Lenora Haley PA-C in the past) [phone] (number given to patient today so he call call while he has access to phone at hospital)     Social Work confirmed there is access to refrigeration for medications at [facility].  If something changes, you can reference the information below about storage outside of regrigeration.     Plan discussed with patient, bedside RN, and primary team.                 Interval History:     The last 24 hours    PATIENT INFORMATION    Anticipatory guidance   Avoid infant walkers  Avoid potential choking hazards (large, spherical, or coin shaped foods)  Avoid small toys (choking hazard)  Car seat issues, including proper placement and transition to toddler seat at 20 pounds  Caution with possible poisons (pills, plants, cosmetics)  Child-proof home with cabinet locks, outlet plugs, window guards, and stair safety shell  Discipline issues: limit-setting, positive reinforcement  Fluoride supplementation if unfluoridated water supply  Importance of varied diet  Never leave unattended  Observe while eating; consider CPR classes  Obtain and know how to use thermometer  Phase out bottle-feeding  Poison Control phone number [phone]  Risk of child pulling down objects on him/herself  Setting hot water heater less than 120 degrees F  Smoke detectors  Use of transitional object (emily bear, etc.) to help with sleep  Whole milk till 2 years old then taper to low-fat or skim  Wind-down activities to help with sleep    Follow-Up  - Return for your 18 month well child visit.    15 months old Health and Safety Tips - The following hyperlinks are available to access via [employer]    Parent Education from [employer]    Educación para padres sobre niños sanos    Common dosing for acetaminophen and ibuprofen:   Acetaminophen (Tylenol) can be given every 4 hours.  · Infant or Children's Elixir: 160mg/5ml for  Weight 6-11 lbs 12-17 lbs 18-23 lbs 24-35 lbs   Dose 1.25 ml 2.5 ml 3.75 ml 5 ml      Weight 36-47 lbs 48-59 lbs 60-71 lsb 72-95 lbs   Dose 7.5 ml 10 ml 12.5 ml 15 ml     Ibuprofen (Motrin) can be given every 6 hours.  Safe for children 6 months and older.  · Infant Drops: 50mg/1.25ml  Weight 12-17 lbs 18-23 lbs   Dose 1.25 ml 1.875 ml     · Children's Elixir 100mg/5ml   Weight 12-17 lbs 18-23 lbs 24-35 lbs 36-47 lbs 48-59 lbs   Dose 2.5 ml 3.75 ml 5 ml 7.5 ml 10 ml        Weight 60-71 lbs 72-95 lbs   Dose 12.5 ml 15 ml      Additional Educational Resources:  For additional resources regarding your symptoms, diagnosis, or further health information, please visit the Discover a Healthier You section on [email or web address] or the Online Health Resources section in [employer].  Patient Education     Well-Child Checkup: 15 Months    At the 15-month checkup, the healthcare provider will examine your child and ask how things are going at home. This checkup gives you a great opportunity to have your questions answered about your child’s emotional and physical development. Bring a list of your questions to the checkup so you can make sure all your concerns are addressed.   This sheet describes some of what you can expect.   Development and milestones  The healthcare provider will ask questions about your child. He or she will observe your toddler to get an idea of the child’s development. By this visit, your child is likely doing some of these:   · Walking  · Squatting down and standing back up  · Pointing at items he or she wants  · Copying some of your actions such as holding a phone to his or her ear, or pointing with a remote control  · Throwing or kicking a ball  · Starting to let you know his or her needs  · Saying 1 or 2 words besides “Mama” and “Jose Maria”  Feeding tips  At 15 months of age, it’s normal for a child to eat 3 meals and a few snacks each day. If your child doesn’t want to eat, that’s OK. Provide food at mealtime, and your child will eat when he or she is hungry. Don't force the child to eat. To help your child eat well:   · Keep serving a variety of finger foods at meals. Don't give up on offering new foods. It often takes several tries before a child starts to like a new taste.  · If your child is hungry between meals, offer healthy foods. Cut-up vegetables and fruit, unsweetened cereal, and crackers are good choices. Save snack foods, such as chips or cookies, for special occasions.  · Your child should continue to  progress and nursing notes reviewed.      Patient sleeping denies any nausea, vomiting or diarrhea.     BG trend:            Planned Procedures/surgeries: none  D5W-containing solutions/medications: none    Recent Labs   Lab 08/11/23  0343 08/11/23  0150 08/10/23  2153 08/10/23  1851 08/10/23  1549 08/10/23  1122   * 424* 417* 258* 319* 321*               Nutrition:     Orders Placed This Encounter      Diet      Consistent Carbohydrate Diet Moderate Consistent Carb (60 g CHO per Meal) Diet      Tube Feeding:  none        PTA Regimen:     Has been prescribed glimepiride-- but may never have taken  Was prescribed metformin XR 2 gm daily-- but states this gives unmanageable diarrhea  Prescribed sitaglitpin 100 mg-- unknown if taking daily  Not monitoring glucose-- lost meter?             Review of Systems:   CC:     Constitutional:   No fever/chills  HEENT:   Denies vision or hearing changes.  No swallowing difficulties.  CV:   Denies CP  Resp:   Denies SOB, cough or wheezing  GI:   no N/V, no constipation, no  diarrhea.  no abd pain.  :  No dysuria or frequency.    Musk:  Denies myalgia/joint pain, no  back pain  Skin/heme:   Denies new rashes/open areas.  No pruritis  Neuro:   No new weakness, denies numbness/tingling, denies headache  Psych:   Denies mood changes or depression/anxiety  Endocrine:  No new polyuria or polydipsia         Medications:   Steroid planning:  none          Physical Exam:     General:   A&O, NAD, pleasant, resting comfortably. Well nourished   HEENT:  NC/AT. MMM, EOMI, Anicteric. Hearing WNL.   Lungs:  unremarkable, no new cough, no SOB  ABD:   rounded, soft  Extremities:  Moving all extremities, no edema, non-tender.    Skin:  warm and dry, no obvious lesions/rash/bleeding  Neuro:  No focal neurological deficits  Psych:   Cooperative, appropriate mood, good eye contact, congruent affect          Data:     Lab Results   Component Value Date    A1C 13.3 08/06/2023    A1C 11.5  04/18/2023    A1C 12.8 12/07/2022    A1C 12.8 11/17/2022    A1C 11.2 02/18/2022    A1C 9.7 11/11/2020    A1C 8.2 09/25/2020    A1C 8.2 08/27/2020    A1C 11.6 02/11/2020    A1C 12.3 01/07/2020            CBC RESULTS:   Recent Labs   Lab Test 08/10/23  0950   WBC 6.3   RBC 4.78   HGB 13.5   HCT 40.6   MCV 85   MCH 28.2   MCHC 33.3   RDW 13.2        Recent Labs   Lab Test 08/11/23  0343 08/11/23  0150 08/10/23  1122 08/10/23  0950 08/09/23  0708 08/09/23  0659   NA  --   --   --  132*  --  133*   POTASSIUM  --   --   --  3.9  --  3.8   CHLORIDE  --   --   --  98  --  97*   CO2  --   --   --  21*  --  24   ANIONGAP  --   --   --  13  --  12   * 424*   < > 378*   < > 447*   BUN  --   --   --  10.6  --  7.2*   CR  --   --   --  0.66*  --  0.65*   LINDA  --   --   --  9.6  --  9.2    < > = values in this interval not displayed.     Liver Function Studies -   Recent Labs   Lab Test 08/06/23  2243   PROTTOTAL 8.0   ALBUMIN 4.5   BILITOTAL 0.5   ALKPHOS 192*   AST 27   ALT 14     Lab Results   Component Value Date    INR 0.91 08/06/2023    INR 1.00 03/28/2023    INR 0.98 02/06/2023    INR 0.95 12/23/2022    INR 0.97 12/17/2022    INR 1.07 12/08/2022    INR 1.44 06/05/2022    INR 0.96 04/29/2022    INR 1.04 01/22/2022    INR 1.00 12/19/2020    INR 1.12 03/07/2020    INR 1.04 02/14/2020    INR 1.03 10/11/2019    INR 1.06 02/05/2018    INR 0.98 01/01/2018    INR 0.91 11/06/2017    INR 1.00 05/26/2017    INR 1.05 12/26/2016    INR 1.03 11/06/2016    INR 1.14 07/25/2016    INR 1.10 05/10/2016         Ceci Bran [facility]-BC, NP  Inpatient Diabetes Management Service  Pager - [phone]  Available on [employer]   To contact Endocrine Diabetes service:   From 7AM-5PM: page inpatient diabetes provider who is following the patient that day (see filed or incomplete progress notes/consult notes).  If uncertain of provider assignment: page job code [id number]. (To page job code in-house dial 3 stars, 777 then enter number).  For  drink whole milk every day. But he or she should get most calories from healthy, solid foods.  · Besides drinking milk, water is best. Limit fruit juice. You can add water to 100% fruit juice and give it to your toddler in a cup. Don’t give your toddler soda.  · Serve drinks in a cup, not a bottle.  · Don’t let your child walk around with food or a bottle. This is a choking risk. It can also lead to overeating as your child gets older.  · Ask the healthcare provider if your child needs a fluoride supplement.  Hygiene tips  · Brush your child’s teeth at least once a day. Twice a day is ideal, such as after breakfast and before bed. Use a small amount of fluoride toothpaste, no larger than a grain of rice. Use a baby’s toothbrush with soft bristles.  · Ask the healthcare provider when your child should have his or her first dental visit. Most pediatric dentists recommend that the first dental visit happen within 6 months after the first tooth appears above the gums, but no later than the child's first birthday.    Sleeping tips  Most children sleep around 10 to 12 hours at night at this age. If your child sleeps more or less than this but seems healthy, it is not a concern. At 15 months of age, many children are down to one nap. Whatever works best for your child and your schedule is fine. To help your child sleep:   · Follow a bedtime routine each night, such as brushing teeth followed by reading a book. Try to stick to the same bedtime each night.  · Don't put your child to bed with anything to drink.  · Check that the crib mattress is on the lowest setting. This helps keep your child from pulling up and climbing or falling out of the crib. If your child is still able to climb out of the crib, use a crib tent, or put the mattress on the floor, or switch to a toddler bed.  · If getting the child to sleep through the night is a problem, ask the healthcare provider for tips.  Safety tips  To keep your toddler  questions or updates AFTER HOURS from 5PM-7AM: page the diabetes job code for on call fellow: [phone]    Please notify inpatient diabetes service if changes are planned to steroids, nutrition, or if procedures are planned requiring prolonged NPO status. Diabetes Management Team job code: [phone]       I spent a total of 35 minutes on the date of the encounter doing prep/post-work, chart review, history and exam, documentation and further activities per the note including lab review, multidisciplinary communication, counseling the patient and/or coordinating care regarding acute hyper/hypoglycemic management, as well as discharge management and planning/communication.  See note for details.       safe:   · Plan ahead. At this age, children are very curious. They are likely to get into items that can be dangerous. Keep latches on cabinets. Keep products like cleansers medicines are out of reach.  · Protect your toddler from falls. Use sturdy screens on windows. Put shell at the tops and bottoms of staircases. Supervise your child on the stairs.  · If you have a swimming pool, put a fence around it. Close and lock shell or doors leading to the pool.  · Watch out for items that are small enough to choke on. As a rule, an item small enough to fit inside a toilet paper tube can cause a child to choke.  · In the car, always put your child in a car seat in the back seat. Babies and toddlers should ride in a rear-facing car safety seat for as long as possible. That means until they reach the top weight or height allowed by their seat.  Check your safety seat instructions. Most convertible safety seats have height and weight limits that will allow children to ride rear-facing for 2 years or more.. Ask your child's healthcare provider if you have questions.  · Teach your child to be gentle and cautious with dogs, cats, and other animals. Always supervise the child around animals, even familiar family pets. Never let your child approach a strange dog or cat.  · Keep this Poison Control phone number in an easy-to-see place, such as on the refrigerator: [phone].  Vaccines  Based on recommendations from the CDC, at this visit your child may get these vaccines:   · Diphtheria, tetanus, and pertussis  · Haemophilus influenzae type b  · Hepatitis A  · Hepatitis B  · Influenza (flu)  · Measles, mumps, and rubella  · Pneumococcus  · Polio  · Chickenpox (varicella)  Teaching good behavior and setting limits  Learning to follow the rules is an important part of growing up. Your toddler may have started to act out by doing things like throwing food or toys. Curiosity may cause your toddler to do something dangerous, such as  touching a hot stove. To encourage good behavior and keep your toddler safe, start setting limits and enforcing rules. Here are some tips:   · Teach your child what’s OK to do and what isn’t. Your child needs to learn to stop what he or she is doing when you say to. Be firm and patient. It will take time for your child to learn the rules. Try not to get frustrated.  · Be consistent with rules and limits. A child can’t learn what’s expected if the rules keep changing.  · Ask questions that help your child make choices, such as, “Do you want to wear your sweater or your jacket?” Never ask a \"yes\" or \"no\" question unless it is OK to answer \"no.\" For example, don’t ask, “Do you want to take a bath?” Simply say, “It’s time for your bath.” Or offer a choice like, “Do you want your bath before or after reading a book?”  · Never let your child’s reaction make you change your mind about a limit that you have set. Rewarding a temper tantrum will only teach your child to throw a tantrum to get what he or she wants.  · If you have questions about setting limits or your child’s behavior, talk with the healthcare provider.  Jaspal last reviewed this educational content on 5/1/2020 © 2000-2020 [employer]. All rights reserved. This information is not intended as a substitute for professional medical care. Always follow your healthcare professional's instructions.

## 2023-08-11 NOTE — PLAN OF CARE
Goal Outcome Evaluation:      Plan of Care Reviewed With: patient    Overall Patient Progress: improving    Outcome Evaluation: Pt A&O. VSS on RA. Up ind. Denies pain. Elevated BGs. Ate snacks w/o informing staff this AM. Educated pt on the importance of telling staff when he eats so that he could be covered. Refused bowel meds. No BM today but does report of passing gas. K and phosp WDL. Slept most of the day. Will cont monitor.

## 2023-08-11 NOTE — PROGRESS NOTES
8/11/2023  2:07 AM    HOSPITAL MEDICINE NOTE:  Paged for increased blood glucose and patient's request for acetaminophen (Tylenol ) - despite an allergy to acetaminophen (Tylenol ).  I searched patient's chart to see about acetaminophen (Tylenol ) use in MAR - I could not confirm use or true allergic reaction.  Plan:  Insulin Novolog 10units X 1, low limited dosing of oxycodone X 1 now.    Simon Guzman MD  [phone] pager  [phone] mobile/cell

## 2023-08-11 NOTE — PROGRESS NOTES
[facility]    Medicine Progress Note - Hospitalist Service, GOLD TEAM 6    Date of Admission:  8/6/2023      Assessment & Plan  Andrew Collier was admitted on 8/7/2023. 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    TODAY   Patient overall is doing well today. Denies any complaints or concerns. Will remain hospitalized due to hyperglycemia.     # Recurrent SBO  # Hypokalemia/hypophosphatemia  - Patient presented with acute onset of L sided abdominal pain, nausea, and NBNB vomiting for 1 day. CT showing duodenal wall thickening and mid small bowel obstruction with smooth tapering transition/no suspicious transition point. Evaluated by gen surg in the ED and agree with medical management. 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 Symptoms improving following NGT decompression.   - Gastrograffin study without evidence of obstruction.   Plan       - full diet  - Miralax, senna at discharge     # T2DM  # Hyperglycemia  # Ketonuria/glucosuria   - Glucose 440 on admit. Low concern for HHS or DKA. Previously poorly controlled with A1c 11.5%, will repeat. Takes metformin and another diabetes pill (presumably Januvia/Sitagliptin). Says he has not started the new diabetes medication he was started on last admit (presumably Glimepiride). Needs new supplies to check blood sugar.  - repeat A1C here 13   Plan  - medium dose correctional, with carb coverage  - lantus 13 units daily   - planning to start exenetide at discharge      # HIV  - Unclear who is managing his HIV. Had multiple  "undetectable viral loads in the past, however last one was 10/2022 and most recent absolute CD4 count is down from prior (330 5/2023 < 526 5/2022). Last ID note found was from 1/2023 and he did not have insurance so had to defer management until insurance obtained again. Follow up due in 4/2023, however no note found. Patient reports taking his Biktarvy. Will get monitoring labs.  Plan  - CD4 count  - HIV viral load  - PTA biktarvy    Diet NPO  DVT Prophylaxis: scds  Code Status: full code     The patient's care was discussed with: attending      Physical Exam    Vital Signs: Temp: 98  F (36.7  C) Temp src: Oral BP: 128/82 Pulse: 84     Resp: 16 SpO2: 98 % O2 Device: None (Room air)    Weight: 150 lbs 0 oz    General Appearance: Alert and oriented x3,   HEENT: Anicteric sclera, MMM   Respiratory: Breathing comfortably on room air, lungs CTAB without wheezing or crackles   Cardiovascular: RRR, S1, S2. No murmur noted  GI: Abdomen soft, non-tender with active bowel sounds. No guarding or rebound  Lymph/Hematologic: No peripheral edema, distal pulses palpable   Skin: No rash or jaundice   Musculoskeletal: Moves all extremities   Neurologic: No focal deficits, CN II-XII grossly intact    Data reviewed today     I reviewed all medications, new labs and imaging results over the last 24 hours. Please see discussion of these results in the A/P.    Clinically Significant Risk Factors                        # DMII: A1C = 13.3 % (Ref range: <5.7 %) within past 6 months     # Overweight: Estimated body mass index is 25.75 kg/m  as calculated from the following:    Height as of this encounter: 1.626 m (5' 4\").    Weight as of this encounter: 68 kg (150 lb).             Data   Recent Labs   Lab 08/11/23  1258 08/11/23  0805 08/11/23  0736 08/10/23  1122 08/10/23  0950 08/09/23  0708 08/09/23  0659 08/06/23  2325 08/06/23  2243   WBC  --   --  6.3  --  6.3  --  6.2   < > 7.7   HGB  --   --  12.7*  --  13.5  --  12.9*   < > 14.2 "   MCV  --   --  85  --  85  --  85   < > 84   PLT  --   --  304  --  306  --  320   < > 340   INR  --   --   --   --   --   --   --   --  0.91   NA  --   --  130*  --  132*  --  133*   < > 135*   POTASSIUM  --   --  3.9  --  3.9  --  3.8   < > 3.9   CHLORIDE  --   --  97*  --  98  --  97*   < > 97*   CO2  --   --  23  --  21*  --  24   < > 23   BUN  --   --  13.6  --  10.6  --  7.2*   < > 15.4   CR  --   --  0.81  --  0.66*  --  0.65*   < > 0.58*   ANIONGAP  --   --  10  --  13  --  12   < > 15   LINDA  --   --  9.4  --  9.6  --  9.2   < > 10.2*   * 454* 489*   < > 378*   < > 447*   < > 442*   ALBUMIN  --   --   --   --   --   --   --   --  4.5   PROTTOTAL  --   --   --   --   --   --   --   --  8.0   BILITOTAL  --   --   --   --   --   --   --   --  0.5   ALKPHOS  --   --   --   --   --   --   --   --  192*   ALT  --   --   --   --   --   --   --   --  14   AST  --   --   --   --   --   --   --   --  27   LIPASE  --   --   --   --   --   --   --   --  44    < > = values in this interval not displayed.       No results found for this or any previous visit (from the past 24 hour(s)).      Billing    Medical Decision Making       60 MINUTES SPENT BY ME on the date of service doing chart review, history, exam, documentation & further activities per the note.        [employer], PAJamC  Hospitalist Service, GOLD TEAM 6  [facility]  Securely message with [employer] (more info)  Text page via [facility] Paging/[employer]

## 2023-08-11 NOTE — PROGRESS NOTES
[facility]    Medicine Progress Note - Hospitalist Service, GOLD TEAM 6    Date of Admission:  8/6/2023      Assessment & Plan  Andrew Collier was admitted on 8/7/2023. 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    TODAY   Patient overall doing ok. Continues to remain hyperglycemic. Will remain hospitalized for further glucose monitoring and adjustment to diabetes regimen.      # Recurrent SBO  # Hypokalemia/hypophosphatemia  - Patient presented with acute onset of L sided abdominal pain, nausea, and NBNB vomiting for 1 day. CT showing duodenal wall thickening and mid small bowel obstruction with smooth tapering transition/no suspicious transition point. Evaluated by gen surg in the ED and agree with medical management. 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 Symptoms improving following NGT decompression.   - Gastrograffin study without evidence of obstruction.   Plan       - full diet  - Miralax, senna at discharge     # T2DM  # Hyperglycemia  # Ketonuria/glucosuria   - Glucose 440 on admit. Low concern for HHS or DKA. Previously poorly controlled with A1c 11.5%, will repeat. Takes metformin and another diabetes pill (presumably Januvia/Sitagliptin). Says he has not started the new diabetes medication he was started on last admit (presumably Glimepiride). Needs new supplies to check blood sugar.  - repeat A1C here 13   Plan  - medium dose correctional, with carb coverage  - lantus 18 units daily   - planning to start exenetide at discharge      # HIV  - Unclear who is  "managing his HIV. Had multiple undetectable viral loads in the past, however last one was 10/2022 and most recent absolute CD4 count is down from prior (330 5/2023 < 526 5/2022). Last ID note found was from 1/2023 and he did not have insurance so had to defer management until insurance obtained again. Follow up due in 4/2023, however no note found. Patient reports taking his Biktarvy. Will get monitoring labs.  Plan  - CD4 count  - HIV viral load  - PTA biktarvy    Diet NPO  DVT Prophylaxis: scds  Code Status: full code     The patient's care was discussed with: attending      Physical Exam    Vital Signs: Temp: 98.1  F (36.7  C) Temp src: Oral BP: (!) 142/86 Pulse: 97     Resp: 16 SpO2: 98 % O2 Device: None (Room air)    Weight: 150 lbs 0 oz    General Appearance: Alert and oriented x3,   HEENT: Anicteric sclera, MMM   Respiratory: Breathing comfortably on room air, lungs CTAB without wheezing or crackles   Cardiovascular: RRR, S1, S2. No murmur noted  GI: Abdomen soft, non-tender with active bowel sounds. No guarding or rebound  Lymph/Hematologic: No peripheral edema, distal pulses palpable   Skin: No rash or jaundice   Musculoskeletal: Moves all extremities   Neurologic: No focal deficits, CN II-XII grossly intact    Data reviewed today     I reviewed all medications, new labs and imaging results over the last 24 hours. Please see discussion of these results in the A/P.    Clinically Significant Risk Factors                        # DMII: A1C = 13.3 % (Ref range: <5.7 %) within past 6 months     # Overweight: Estimated body mass index is 25.75 kg/m  as calculated from the following:    Height as of this encounter: 1.626 m (5' 4\").    Weight as of this encounter: 68 kg (150 lb).             Data   Recent Labs   Lab 08/11/23  0614 08/11/23  0343 08/11/23  0150 08/10/23  1122 08/10/23  0950 08/09/23  0708 08/09/23  0659 08/08/23  0746 08/08/23  0707 08/07/23  0805 08/07/23  0647 08/06/23  2325 08/06/23  2243   WBC  " --   --   --   --  6.3  --  6.2  --   --   --  7.0  --  7.7   HGB  --   --   --   --  13.5  --  12.9*  --   --   --  13.3   < > 14.2   MCV  --   --   --   --  85  --  85  --   --   --  87  --  84   PLT  --   --   --   --  306  --  320  --   --   --  301  --  340   INR  --   --   --   --   --   --   --   --   --   --   --   --  0.91   NA  --   --   --   --  132*  --  133*  --  134*  --   --    < > 135*   POTASSIUM  --   --   --   --  3.9  --  3.8  --  3.5   < >  --    < > 3.9   CHLORIDE  --   --   --   --  98  --  97*  --  100  --   --    < > 97*   CO2  --   --   --   --  21*  --  24  --  24  --   --    < > 23   BUN  --   --   --   --  10.6  --  7.2*  --  8.3  --   --    < > 15.4   CR  --   --   --   --  0.66*  --  0.65*  --  0.64*  --   --    < > 0.58*   ANIONGAP  --   --   --   --  13  --  12  --  10  --   --    < > 15   LINDA  --   --   --   --  9.6  --  9.2  --  9.1  --   --    < > 10.2*   * 400* 424*   < > 378*   < > 447*   < > 252*   < >  --    < > 442*   ALBUMIN  --   --   --   --   --   --   --   --   --   --   --   --  4.5   PROTTOTAL  --   --   --   --   --   --   --   --   --   --   --   --  8.0   BILITOTAL  --   --   --   --   --   --   --   --   --   --   --   --  0.5   ALKPHOS  --   --   --   --   --   --   --   --   --   --   --   --  192*   ALT  --   --   --   --   --   --   --   --   --   --   --   --  14   AST  --   --   --   --   --   --   --   --   --   --   --   --  27   LIPASE  --   --   --   --   --   --   --   --   --   --   --   --  44    < > = values in this interval not displayed.       No results found for this or any previous visit (from the past 24 hour(s)).      Billing    Medical Decision Making       60 MINUTES SPENT BY ME on the date of service doing chart review, history, exam, documentation & further activities per the note.        Aguila Painting PAJamC  Hospitalist Service, GOLD TEAM 6  [facility]  Securely message with [employer]  (more info)  Text page via [facility] Paging/Directory

## 2023-08-12 LAB
ANION GAP SERPL CALCULATED.3IONS-SCNC: 12 MMOL/L (ref 7–15)
BUN SERPL-MCNC: 14.1 MG/DL (ref 8–23)
CALCIUM SERPL-MCNC: 9 MG/DL (ref 8.6–10)
CHLORIDE SERPL-SCNC: 100 MMOL/L (ref 98–107)
CREAT SERPL-MCNC: 0.65 MG/DL (ref 0.67–1.17)
DEPRECATED HCO3 PLAS-SCNC: 22 MMOL/L (ref 22–29)
ERYTHROCYTE [DISTWIDTH] IN BLOOD BY AUTOMATED COUNT: 13.1 % (ref 10–15)
GFR SERPL CREATININE-BSD FRML MDRD: >90 ML/MIN/1.73M2
GLUCOSE BLDC GLUCOMTR-MCNC: 241 MG/DL (ref 70–99)
GLUCOSE BLDC GLUCOMTR-MCNC: 275 MG/DL (ref 70–99)
GLUCOSE BLDC GLUCOMTR-MCNC: 294 MG/DL (ref 70–99)
GLUCOSE BLDC GLUCOMTR-MCNC: 359 MG/DL (ref 70–99)
GLUCOSE BLDC GLUCOMTR-MCNC: 382 MG/DL (ref 70–99)
GLUCOSE BLDC GLUCOMTR-MCNC: 436 MG/DL (ref 70–99)
GLUCOSE SERPL-MCNC: 353 MG/DL (ref 70–99)
HCT VFR BLD AUTO: 36.7 % (ref 40–53)
HGB BLD-MCNC: 12.3 G/DL (ref 13.3–17.7)
MCH RBC QN AUTO: 28.3 PG (ref 26.5–33)
MCHC RBC AUTO-ENTMCNC: 33.5 G/DL (ref 31.5–36.5)
MCV RBC AUTO: 85 FL (ref 78–100)
PHOSPHATE SERPL-MCNC: 3.7 MG/DL (ref 2.5–4.5)
PLATELET # BLD AUTO: 283 10E3/UL (ref 150–450)
POTASSIUM SERPL-SCNC: 3.5 MMOL/L (ref 3.4–5.3)
RBC # BLD AUTO: 4.34 10E6/UL (ref 4.4–5.9)
SODIUM SERPL-SCNC: 134 MMOL/L (ref 136–145)
WBC # BLD AUTO: 6.7 10E3/UL (ref 4–11)

## 2023-08-12 PROCEDURE — 84100 ASSAY OF PHOSPHORUS: CPT | Performed by: STUDENT IN AN ORGANIZED HEALTH CARE EDUCATION/TRAINING PROGRAM

## 2023-08-12 PROCEDURE — 250N000011 HC RX IP 250 OP 636: Mod: JZ | Performed by: PHYSICIAN ASSISTANT

## 2023-08-12 PROCEDURE — 120N000002 HC R&B MED SURG/OB UMMC

## 2023-08-12 PROCEDURE — 99207 PR APP CREDIT; MD BILLING SHARED VISIT: CPT | Performed by: PHYSICIAN ASSISTANT

## 2023-08-12 PROCEDURE — 250N000013 HC RX MED GY IP 250 OP 250 PS 637: Performed by: PHYSICIAN ASSISTANT

## 2023-08-12 PROCEDURE — 85027 COMPLETE CBC AUTOMATED: CPT | Performed by: PHYSICIAN ASSISTANT

## 2023-08-12 PROCEDURE — 250N000013 HC RX MED GY IP 250 OP 250 PS 637: Performed by: CLINICAL NURSE SPECIALIST

## 2023-08-12 PROCEDURE — 36415 COLL VENOUS BLD VENIPUNCTURE: CPT | Performed by: PHYSICIAN ASSISTANT

## 2023-08-12 PROCEDURE — 80048 BASIC METABOLIC PNL TOTAL CA: CPT | Performed by: PHYSICIAN ASSISTANT

## 2023-08-12 PROCEDURE — 250N000013 HC RX MED GY IP 250 OP 250 PS 637

## 2023-08-12 PROCEDURE — 99232 SBSQ HOSP IP/OBS MODERATE 35: CPT | Mod: FS | Performed by: STUDENT IN AN ORGANIZED HEALTH CARE EDUCATION/TRAINING PROGRAM

## 2023-08-12 PROCEDURE — 99232 SBSQ HOSP IP/OBS MODERATE 35: CPT | Performed by: NURSE PRACTITIONER

## 2023-08-12 RX ORDER — POTASSIUM CHLORIDE 750 MG/1
20 TABLET, EXTENDED RELEASE ORAL ONCE
Status: COMPLETED | OUTPATIENT
Start: 2023-08-12 | End: 2023-08-12

## 2023-08-12 RX ADMIN — INSULIN ASPART 5 UNITS: 100 INJECTION, SOLUTION INTRAVENOUS; SUBCUTANEOUS at 22:37

## 2023-08-12 RX ADMIN — SITAGLIPTIN 100 MG: 100 TABLET, FILM COATED ORAL at 08:21

## 2023-08-12 RX ADMIN — INSULIN ASPART 6 UNITS: 100 INJECTION, SOLUTION INTRAVENOUS; SUBCUTANEOUS at 09:55

## 2023-08-12 RX ADMIN — POLYETHYLENE GLYCOL 3350 17 G: 17 POWDER, FOR SOLUTION ORAL at 08:28

## 2023-08-12 RX ADMIN — KETOROLAC TROMETHAMINE 30 MG: 15 INJECTION, SOLUTION INTRAMUSCULAR; INTRAVENOUS at 09:55

## 2023-08-12 RX ADMIN — BICTEGRAVIR SODIUM, EMTRICITABINE, AND TENOFOVIR ALAFENAMIDE FUMARATE 1 TABLET: 50; 200; 25 TABLET ORAL at 08:21

## 2023-08-12 RX ADMIN — POTASSIUM CHLORIDE 20 MEQ: 750 TABLET, EXTENDED RELEASE ORAL at 14:43

## 2023-08-12 RX ADMIN — SENNOSIDES AND DOCUSATE SODIUM 1 TABLET: 50; 8.6 TABLET ORAL at 08:21

## 2023-08-12 RX ADMIN — BUPROPION HYDROCHLORIDE 150 MG: 150 TABLET, FILM COATED, EXTENDED RELEASE ORAL at 08:21

## 2023-08-12 RX ADMIN — INSULIN ASPART 14 UNITS: 100 INJECTION, SOLUTION INTRAVENOUS; SUBCUTANEOUS at 17:58

## 2023-08-12 RX ADMIN — FAMOTIDINE 40 MG: 20 TABLET ORAL at 08:21

## 2023-08-12 ASSESSMENT — ACTIVITIES OF DAILY LIVING (ADL)
ADLS_ACUITY_SCORE: 22

## 2023-08-12 NOTE — PLAN OF CARE
Goal Outcome Evaluation:      Plan of Care Reviewed With: patient    Overall Patient Progress: no changeOverall Patient Progress: no change    Outcome Evaluation: VSS, A&Ox4, 0200 , 1x 10u dose, plus 3u snacks. Pt states he is leaving today. (8/12)

## 2023-08-12 NOTE — PLAN OF CARE
Goal Outcome Evaluation:    Isolation: MRSA      Status: a 57 year old w/ PMH recurrent SBO of unclear etiology, hx of perforated appendicitis s/p appendectomy, HIV infection c/b hx HSV and hx CNS toxoplasmosis, poorly controlled T2DM, housing insecurity, and substance abuse who was admitted sudden onset abdominal pain and vomiting found to have recurrent SBO on 8/7/2023, managed medically.      Aox4. Patient recived carb coverage for 2 packs of crackers and had BS check of 244. Patient is calm and denies pain or nausea. Currently in bed and is up ad lexi no issues with voiding or ambulating on own. Patient received long acting insulin to help with BS. BS has since decreased continue to monitor.    Hand off given to next nurse.

## 2023-08-12 NOTE — PLAN OF CARE
Goal Outcome Evaluation:      Plan of Care Reviewed With: patient    Overall Patient Progress: no changeOverall Patient Progress: no change    Outcome Evaluation: 9338-3049: Afebrile. VS unchanged. A/Ox4. Up independently in room. Sleeping most of the day between cares. Having intermittent aching in R shoulder. PRN IV toradol given x1 with good relief. Has not had BM since 8/9. Given senna and miralax this morning. Voiding adequately. Sliding scale novolog given per orders. Trying to dose carb coverage as accurately as possible by keeping track of what snacks are brought into the room. Pt still not reporting extra snacks. Using call light appropriately. Can discharge once blood sugars are better controlled.

## 2023-08-12 NOTE — PROGRESS NOTES
[facility]    Medicine Progress Note - Hospitalist Service, GOLD TEAM 6    Date of Admission:  8/6/2023      Assessment & Plan  Andrew Collier was admitted on 8/7/2023. 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    TODAY   Remains hyperglycemic 400's likely a component at least of snacking that is not being reported. It has been noted that he is allergic to trulicity in the past with rash, and this is causing hesitation to start the previously planned Bydureon. Discussed with endocrine, and it would be preferable to remain hospitalized for better sugar control and better discharge plan. Of note, his HIV viral load is detectable at 19,000 while it was previously undetectable and I am not sure if this is related to suboptimal compliance, patient reports he did run out of his medications for a few weeks.      # Recurrent SBO  # Hypokalemia/hypophosphatemia  - Patient presented with acute onset of L sided abdominal pain, nausea, and NBNB vomiting for 1 day. CT showing duodenal wall thickening and mid small bowel obstruction with smooth tapering transition/no suspicious transition point. Evaluated by gen surg in the ED and agree with medical management. 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 Symptoms improving following NGT decompression.   - Gastrograffin study without evidence of obstruction.   Plan       - full diet  - Miralax, senna at discharge     # T2DM  # Hyperglycemia  # Ketonuria/glucosuria   - Glucose 440 on admit. Low concern for HHS or DKA. Previously poorly  controlled with A1c 11.5%, will repeat. Takes metformin and another diabetes pill (presumably Januvia/Sitagliptin). Says he has not started the new diabetes medication he was started on last admit (presumably Glimepiride). Needs new supplies to check blood sugar.  - repeat A1C here 13   Plan  - medium dose correctional, with carb coverage  - lantus 18 units daily   - discuss alternatives to exenetide iso allergy      # HIV  - Unclear who is managing his HIV. Had multiple undetectable viral loads in the past, however last one was 10/2022 and most recent absolute CD4 count is down from prior (330 5/2023 < 526 5/2022). Last ID note found was from 1/2023 and he did not have insurance so had to defer management until insurance obtained again. Follow up due in 4/2023, however no note found. Patient reports taking his Biktarvy. Will get monitoring labs-> CD4 is 535, viral load 19,900  Plan  - continue PTA biktarvy  - needs to re-establish ID follow up     Diet NPO  DVT Prophylaxis: scds  Code Status: full code     The patient's care was discussed with: attending      Physical Exam    Vital Signs: Temp: 98.2  F (36.8  C) Temp src: Oral BP: (!) 158/86 Pulse: 91     Resp: 18 SpO2: 99 % O2 Device: None (Room air)    Weight: 150 lbs 0 oz    General Appearance: Alert and oriented x3,   HEENT: Anicteric sclera, MMM   Respiratory: Breathing comfortably on room air, lungs CTAB without wheezing or crackles   Cardiovascular: RRR, S1, S2. No murmur noted  GI: Abdomen soft, non-tender with active bowel sounds. No guarding or rebound  Lymph/Hematologic: No peripheral edema, distal pulses palpable   Skin: No rash or jaundice   Musculoskeletal: Moves all extremities   Neurologic: No focal deficits, CN II-XII grossly intact    Data reviewed today     I reviewed all medications, new labs and imaging results over the last 24 hours. Please see discussion of these results in the A/P.    Clinically Significant Risk Factors                       "  # DMII: A1C = 13.3 % (Ref range: <5.7 %) within past 6 months     # Overweight: Estimated body mass index is 25.75 kg/m  as calculated from the following:    Height as of this encounter: 1.626 m (5' 4\").    Weight as of this encounter: 68 kg (150 lb).             Data   Recent Labs   Lab 08/12/23  0550 08/12/23  0158 08/11/23  2125 08/11/23  0805 08/11/23  0736 08/10/23  1122 08/10/23  0950 08/09/23  0708 08/09/23  0659 08/06/23  2325 08/06/23  2243   WBC  --   --   --   --  6.3  --  6.3  --  6.2   < > 7.7   HGB  --   --   --   --  12.7*  --  13.5  --  12.9*   < > 14.2   MCV  --   --   --   --  85  --  85  --  85   < > 84   PLT  --   --   --   --  304  --  306  --  320   < > 340   INR  --   --   --   --   --   --   --   --   --   --  0.91   NA  --   --   --   --  130*  --  132*  --  133*   < > 135*   POTASSIUM  --   --   --   --  3.9  --  3.9  --  3.8   < > 3.9   CHLORIDE  --   --   --   --  97*  --  98  --  97*   < > 97*   CO2  --   --   --   --  23  --  21*  --  24   < > 23   BUN  --   --   --   --  13.6  --  10.6  --  7.2*   < > 15.4   CR  --   --   --   --  0.81  --  0.66*  --  0.65*   < > 0.58*   ANIONGAP  --   --   --   --  10  --  13  --  12   < > 15   LINDA  --   --   --   --  9.4  --  9.6  --  9.2   < > 10.2*   * 382* 244*   < > 489*   < > 378*   < > 447*   < > 442*   ALBUMIN  --   --   --   --   --   --   --   --   --   --  4.5   PROTTOTAL  --   --   --   --   --   --   --   --   --   --  8.0   BILITOTAL  --   --   --   --   --   --   --   --   --   --  0.5   ALKPHOS  --   --   --   --   --   --   --   --   --   --  192*   ALT  --   --   --   --   --   --   --   --   --   --  14   AST  --   --   --   --   --   --   --   --   --   --  27   LIPASE  --   --   --   --   --   --   --   --   --   --  44    < > = values in this interval not displayed.       No results found for this or any previous visit (from the past 24 hour(s)).      Billing    Medical Decision Making       60 MINUTES SPENT BY ME on the " date of service doing chart review, history, exam, documentation & further activities per the note.        Aguila Painting PA-C  Hospitalist Service, GOLD TEAM [address] [place] [address]  Securely message with [employer] (more info)  Text page via [facility] Paging/Directory

## 2023-08-12 NOTE — PROGRESS NOTES
Diabetes Consult Daily  Progress Note          Assessment/Plan:     HPI:    DM, housing insecurity, and substance abuse who was admitted sudden onset abdominal pain and vomiting found to have recurrent SBO on 8/7/2023, managed medically.         Assessment:   #uncontrolled type 2 diabetes with unknown usual use of antihyperglycemics   #Has allergy to GLP-1 rash at injection site     Inpatient Plan:               - Continue sitagliptin 100 mg while inpatient              - GIve Lantus 10 units at 1200              - Increase Lantus to 22 units daily at 2200              - Increase carb coverage to Novolog 1:6 CHO coverage with meals and snacks/supplements              - Novolog  high intensity sliding scale TID AC, hs, 0200               - BG monitoring TID AC, HS, 0200              - hypoglycemia protocol              - carb counting protocol     Tentative Discharge Planning: carried over documentation                Stop metformin because it gives you unmanageable diarrhea  Stop sitagliptin (Januvia) because you will start Bydureon  Start Bydureon B-Cise 2 mg every week on Thursdays  Take Lantus 25-26 units every evening  Check glucose twice per day: once when you wake up in the morning and once in the evening  Call to schedule a diabetes clinic appt (you have seen Lenora Haley PA-C in the past) [phone] (number given to patient today so he call call while he has access to phone at hospital)     Social Work confirmed there is access to refrigeration for medications at [facility].  If something changes, you can reference the information below about storage outside of regrigeration.     Plan discussed with patient, bedside RN, and primary team.        Diabetes service will continue to follow.  Please do not hesitate to contact the team with any questions/concerns.      Please notify inpatient diabetes service if changes are planned that will impact glycemia, such as NPO,  starting/adjusting steroids, enteral feeding, parenteral feeding, dextrose fluids or procedures.           Interval History:     The last 24 hours progress and nursing notes reviewed.      Patient slept off and on. Reports good appetitie     Patient reports he is leaving today.     BG trend:              Planned Procedures/surgeries: none  D5W-containing solutions/medications: none    Recent Labs   Lab 08/12/23  0550 08/12/23  0158 08/11/23  2125 08/11/23  1746 08/11/23  1258 08/11/23  0805   * 382* 244* 440* 286* 454*               Nutrition:     Orders Placed This Encounter      Diet      Consistent Carbohydrate Diet Moderate Consistent Carb (60 g CHO per Meal) Diet      Tube Feeding:  none        PTA Regimen:     Has been prescribed glimepiride-- but may never have taken  Was prescribed metformin XR 2 gm daily-- but states this gives unmanageable diarrhea  Prescribed sitaglitpin 100 mg-- unknown if taking daily  Not monitoring glucose-- lost meter?          Review of Systems:       Constitutional:   No fever/chills  HEENT:   Denies vision or hearing changes.  No swallowing difficulties.  CV:   Denies CP  Resp:   Denies SOB, cough or wheezing  GI:   no N/V, no constipation, no  diarrhea.  no abd pain.  :  No dysuria or frequency.    Musk:  Denies myalgia/joint pain, no back pain  Skin/heme:   Denies new rashes/open areas.  No pruritis  Neuro:   No new weakness, denies numbness/tingling, denies headache  Psych:   Denies mood changes or depression/anxiety  Endocrine:  No new polyuria or polydipsia         Medications:   Steroid planning:  non         Physical Exam:     General:   A&O, NAD, pleasant, resting comfortably. Well nourished   HEENT:  NC/AT. MMM, EOMI, Anicteric. Hearing WNL.   Lungs:  unremarkable, no new cough, no SOB  ABD:   rounded, soft  Extremities:  Moving all extremities, no edema, non-tender.   Skin:  warm and dry, no obvious lesions/rash/bleeding  Neuro:  No focal neurological  deficits  Psych:   Cooperative, appropriate mood, good eye contact, congruent affect          Data:     Lab Results   Component Value Date    A1C 13.3 08/06/2023    A1C 11.5 04/18/2023    A1C 12.8 12/07/2022    A1C 12.8 11/17/2022    A1C 11.2 02/18/2022    A1C 9.7 11/11/2020    A1C 8.2 09/25/2020    A1C 8.2 08/27/2020    A1C 11.6 02/11/2020    A1C 12.3 01/07/2020            CBC RESULTS:   Recent Labs   Lab Test 08/11/23  0736   WBC 6.3   RBC 4.46   HGB 12.7*   HCT 37.8*   MCV 85   MCH 28.5   MCHC 33.6   RDW 13.2        Recent Labs   Lab Test 08/12/23  0550 08/12/23  0158 08/11/23  0805 08/11/23  0736 08/10/23  1122 08/10/23  0950   NA  --   --   --  130*  --  132*   POTASSIUM  --   --   --  3.9  --  3.9   CHLORIDE  --   --   --  97*  --  98   CO2  --   --   --  23  --  21*   ANIONGAP  --   --   --  10  --  13   * 382*   < > 489*   < > 378*   BUN  --   --   --  13.6  --  10.6   CR  --   --   --  0.81  --  0.66*   LINDA  --   --   --  9.4  --  9.6    < > = values in this interval not displayed.     Liver Function Studies -   Recent Labs   Lab Test 08/06/23  2243   PROTTOTAL 8.0   ALBUMIN 4.5   BILITOTAL 0.5   ALKPHOS 192*   AST 27   ALT 14     Lab Results   Component Value Date    INR 0.91 08/06/2023    INR 1.00 03/28/2023    INR 0.98 02/06/2023    INR 0.95 12/23/2022    INR 0.97 12/17/2022    INR 1.07 12/08/2022    INR 1.44 06/05/2022    INR 0.96 04/29/2022    INR 1.04 01/22/2022    INR 1.00 12/19/2020    INR 1.12 03/07/2020    INR 1.04 02/14/2020    INR 1.03 10/11/2019    INR 1.06 02/05/2018    INR 0.98 01/01/2018    INR 0.91 11/06/2017    INR 1.00 05/26/2017    INR 1.05 12/26/2016    INR 1.03 11/06/2016    INR 1.14 07/25/2016    INR 1.10 05/10/2016         MITESH Tay-BC, NP  Inpatient Diabetes Management Service  Pager - [phone]  Available on Vocera      To contact Endocrine Diabetes service:   From 7AM-5PM: page inpatient diabetes provider who is following the patient that day (see  filed or incomplete progress notes/consult notes).  If uncertain of provider assignment: page job code [phone]. (To page job code in-house dial 3 stars, [phone] then enter number).  For questions or updates AFTER HOURS from 5PM-7AM: page the diabetes job code for on call fellow: [phone]    Please notify inpatient diabetes service if changes are planned to steroids, nutrition, or if procedures are planned requiring prolonged NPO status. Diabetes Management Team job code: [phone]       I spent a total of 35 minutes on the date of the encounter doing prep/post-work, chart review, history and exam, documentation and further activities per the note including lab review, multidisciplinary communication, counseling the patient and/or coordinating care regarding acute hyper/hypoglycemic management, as well as discharge management and planning/communication.  See note for details.

## 2023-08-13 ENCOUNTER — APPOINTMENT (OUTPATIENT)
Dept: GENERAL RADIOLOGY | Facility: CLINIC | Age: 58
End: 2023-08-13
Attending: PHYSICIAN ASSISTANT
Payer: COMMERCIAL

## 2023-08-13 LAB
ANION GAP SERPL CALCULATED.3IONS-SCNC: 14 MMOL/L (ref 7–15)
B-OH-BUTYR SERPL-SCNC: <0.18 MMOL/L
BUN SERPL-MCNC: 14.2 MG/DL (ref 8–23)
CALCIUM SERPL-MCNC: 9 MG/DL (ref 8.6–10)
CHLORIDE SERPL-SCNC: 98 MMOL/L (ref 98–107)
CREAT SERPL-MCNC: 0.65 MG/DL (ref 0.67–1.17)
DEPRECATED HCO3 PLAS-SCNC: 19 MMOL/L (ref 22–29)
GFR SERPL CREATININE-BSD FRML MDRD: >90 ML/MIN/1.73M2
GLUCOSE BLDC GLUCOMTR-MCNC: 158 MG/DL (ref 70–99)
GLUCOSE BLDC GLUCOMTR-MCNC: 161 MG/DL (ref 70–99)
GLUCOSE BLDC GLUCOMTR-MCNC: 170 MG/DL (ref 70–99)
GLUCOSE BLDC GLUCOMTR-MCNC: 175 MG/DL (ref 70–99)
GLUCOSE BLDC GLUCOMTR-MCNC: 181 MG/DL (ref 70–99)
GLUCOSE BLDC GLUCOMTR-MCNC: 216 MG/DL (ref 70–99)
GLUCOSE BLDC GLUCOMTR-MCNC: 223 MG/DL (ref 70–99)
GLUCOSE BLDC GLUCOMTR-MCNC: 270 MG/DL (ref 70–99)
GLUCOSE BLDC GLUCOMTR-MCNC: 283 MG/DL (ref 70–99)
GLUCOSE BLDC GLUCOMTR-MCNC: 290 MG/DL (ref 70–99)
GLUCOSE BLDC GLUCOMTR-MCNC: 332 MG/DL (ref 70–99)
GLUCOSE BLDC GLUCOMTR-MCNC: 367 MG/DL (ref 70–99)
GLUCOSE BLDC GLUCOMTR-MCNC: 475 MG/DL (ref 70–99)
GLUCOSE BLDC GLUCOMTR-MCNC: 477 MG/DL (ref 70–99)
GLUCOSE BLDC GLUCOMTR-MCNC: 531 MG/DL (ref 70–99)
GLUCOSE SERPL-MCNC: 562 MG/DL (ref 70–99)
HOLD SPECIMEN: NORMAL
PHOSPHATE SERPL-MCNC: 3.6 MG/DL (ref 2.5–4.5)
POTASSIUM SERPL-SCNC: 4 MMOL/L (ref 3.4–5.3)
POTASSIUM SERPL-SCNC: 4 MMOL/L (ref 3.4–5.3)
SODIUM SERPL-SCNC: 131 MMOL/L (ref 136–145)

## 2023-08-13 PROCEDURE — 73030 X-RAY EXAM OF SHOULDER: CPT | Mod: 26 | Performed by: RADIOLOGY

## 2023-08-13 PROCEDURE — 99233 SBSQ HOSP IP/OBS HIGH 50: CPT | Mod: FS | Performed by: STUDENT IN AN ORGANIZED HEALTH CARE EDUCATION/TRAINING PROGRAM

## 2023-08-13 PROCEDURE — 82010 KETONE BODYS QUAN: CPT | Performed by: PHYSICIAN ASSISTANT

## 2023-08-13 PROCEDURE — 250N000013 HC RX MED GY IP 250 OP 250 PS 637

## 2023-08-13 PROCEDURE — 250N000013 HC RX MED GY IP 250 OP 250 PS 637: Performed by: PHYSICIAN ASSISTANT

## 2023-08-13 PROCEDURE — 250N000009 HC RX 250: Performed by: NURSE PRACTITIONER

## 2023-08-13 PROCEDURE — 99207 PR APP CREDIT; MD BILLING SHARED VISIT: CPT | Performed by: PHYSICIAN ASSISTANT

## 2023-08-13 PROCEDURE — 84132 ASSAY OF SERUM POTASSIUM: CPT | Performed by: PHYSICIAN ASSISTANT

## 2023-08-13 PROCEDURE — 250N000013 HC RX MED GY IP 250 OP 250 PS 637: Performed by: CLINICAL NURSE SPECIALIST

## 2023-08-13 PROCEDURE — 84100 ASSAY OF PHOSPHORUS: CPT | Performed by: PHYSICIAN ASSISTANT

## 2023-08-13 PROCEDURE — 250N000013 HC RX MED GY IP 250 OP 250 PS 637: Performed by: INTERNAL MEDICINE

## 2023-08-13 PROCEDURE — 250N000011 HC RX IP 250 OP 636: Performed by: PHYSICIAN ASSISTANT

## 2023-08-13 PROCEDURE — 120N000002 HC R&B MED SURG/OB UMMC

## 2023-08-13 PROCEDURE — 99232 SBSQ HOSP IP/OBS MODERATE 35: CPT | Performed by: NURSE PRACTITIONER

## 2023-08-13 PROCEDURE — 36415 COLL VENOUS BLD VENIPUNCTURE: CPT | Performed by: PHYSICIAN ASSISTANT

## 2023-08-13 PROCEDURE — 80048 BASIC METABOLIC PNL TOTAL CA: CPT | Performed by: PHYSICIAN ASSISTANT

## 2023-08-13 PROCEDURE — 73030 X-RAY EXAM OF SHOULDER: CPT | Mod: RT

## 2023-08-13 RX ORDER — NICOTINE POLACRILEX 4 MG
15-30 LOZENGE BUCCAL
Status: DISCONTINUED | OUTPATIENT
Start: 2023-08-13 | End: 2023-08-16 | Stop reason: HOSPADM

## 2023-08-13 RX ORDER — MAGNESIUM HYDROXIDE/ALUMINUM HYDROXICE/SIMETHICONE 120; 1200; 1200 MG/30ML; MG/30ML; MG/30ML
15 SUSPENSION ORAL 3 TIMES DAILY PRN
Status: DISCONTINUED | OUTPATIENT
Start: 2023-08-13 | End: 2023-08-16 | Stop reason: HOSPADM

## 2023-08-13 RX ORDER — DEXTROSE MONOHYDRATE 25 G/50ML
25-50 INJECTION, SOLUTION INTRAVENOUS
Status: DISCONTINUED | OUTPATIENT
Start: 2023-08-13 | End: 2023-08-16 | Stop reason: HOSPADM

## 2023-08-13 RX ORDER — KETOROLAC TROMETHAMINE 15 MG/ML
30 INJECTION, SOLUTION INTRAMUSCULAR; INTRAVENOUS DAILY PRN
Status: DISCONTINUED | OUTPATIENT
Start: 2023-08-13 | End: 2023-08-16

## 2023-08-13 RX ADMIN — SITAGLIPTIN 100 MG: 100 TABLET, FILM COATED ORAL at 08:24

## 2023-08-13 RX ADMIN — BUPROPION HYDROCHLORIDE 150 MG: 150 TABLET, FILM COATED, EXTENDED RELEASE ORAL at 08:24

## 2023-08-13 RX ADMIN — INSULIN ASPART 7 UNITS: 100 INJECTION, SOLUTION INTRAVENOUS; SUBCUTANEOUS at 19:02

## 2023-08-13 RX ADMIN — INSULIN ASPART 6 UNITS: 100 INJECTION, SOLUTION INTRAVENOUS; SUBCUTANEOUS at 13:21

## 2023-08-13 RX ADMIN — POLYETHYLENE GLYCOL 3350 17 G: 17 POWDER, FOR SOLUTION ORAL at 08:24

## 2023-08-13 RX ADMIN — FAMOTIDINE 40 MG: 20 TABLET ORAL at 05:59

## 2023-08-13 RX ADMIN — INSULIN HUMAN 3.5 UNITS/HR: 1 INJECTION, SOLUTION INTRAVENOUS at 13:13

## 2023-08-13 RX ADMIN — BICTEGRAVIR SODIUM, EMTRICITABINE, AND TENOFOVIR ALAFENAMIDE FUMARATE 1 TABLET: 50; 200; 25 TABLET ORAL at 08:24

## 2023-08-13 RX ADMIN — INSULIN ASPART 10 UNITS: 100 INJECTION, SOLUTION INTRAVENOUS; SUBCUTANEOUS at 10:10

## 2023-08-13 RX ADMIN — SENNOSIDES AND DOCUSATE SODIUM 1 TABLET: 50; 8.6 TABLET ORAL at 21:30

## 2023-08-13 RX ADMIN — ALUMINUM HYDROXIDE, MAGNESIUM HYDROXIDE, AND SIMETHICONE 15 ML: 200; 200; 20 SUSPENSION ORAL at 19:08

## 2023-08-13 RX ADMIN — KETOROLAC TROMETHAMINE 30 MG: 15 INJECTION INTRAMUSCULAR; INTRAVENOUS at 16:55

## 2023-08-13 RX ADMIN — SENNOSIDES AND DOCUSATE SODIUM 1 TABLET: 50; 8.6 TABLET ORAL at 08:24

## 2023-08-13 ASSESSMENT — ACTIVITIES OF DAILY LIVING (ADL)
ADLS_ACUITY_SCORE: 22

## 2023-08-13 NOTE — PLAN OF CARE
Goal Outcome Evaluation:      Plan of Care Reviewed With: patient    Overall Patient Progress: no changeOverall Patient Progress: no change    Outcome Evaluation: 3497-1512: A&O, VSS on RA with HTN, BG still not under control despite additional carb coverage and 0200 1x orders. Most recent: 294, 475, 477. Pt c/o heartburn, given scheduled Pepcid early, GI cocktail available.

## 2023-08-13 NOTE — PLAN OF CARE
Goal Outcome Evaluation:      Plan of Care Reviewed With: patient    Overall Patient Progress: no changeOverall Patient Progress: no change    Outcome Evaluation: 8569-1704: Afebrile. VS stable and unchanged. A/Ox4. Up independently. Started on insulin gtt this afternoon, titrated hourly. Pt is currently on algorithm 4. Continues to receive subcutaneous novolog for carb coverage. Scheduled stool softeners given this morning and pt had large, loose BM afterward. Will hold evening dose. Continues to report pain in R shoulder. IV toradol given with relief. Xray done without evidence of fracture. Can discharge once blood sugars are controlled. Pt wants to talk to  tomorrow about housing at time of discharge.
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
## 2023-08-14 ENCOUNTER — DOCUMENTATION ONLY (OUTPATIENT)
Dept: MEDSURG UNIT | Facility: CLINIC | Age: 58
End: 2023-08-14

## 2023-08-14 LAB
ANION GAP SERPL CALCULATED.3IONS-SCNC: 13 MMOL/L (ref 7–15)
BUN SERPL-MCNC: 21 MG/DL (ref 8–23)
CALCIUM SERPL-MCNC: 8.9 MG/DL (ref 8.6–10)
CHLORIDE SERPL-SCNC: 105 MMOL/L (ref 98–107)
CREAT SERPL-MCNC: 0.69 MG/DL (ref 0.67–1.17)
DEPRECATED HCO3 PLAS-SCNC: 20 MMOL/L (ref 22–29)
ERYTHROCYTE [DISTWIDTH] IN BLOOD BY AUTOMATED COUNT: 13.3 % (ref 10–15)
GFR SERPL CREATININE-BSD FRML MDRD: >90 ML/MIN/1.73M2
GLUCOSE BLDC GLUCOMTR-MCNC: 122 MG/DL (ref 70–99)
GLUCOSE BLDC GLUCOMTR-MCNC: 123 MG/DL (ref 70–99)
GLUCOSE BLDC GLUCOMTR-MCNC: 135 MG/DL (ref 70–99)
GLUCOSE BLDC GLUCOMTR-MCNC: 145 MG/DL (ref 70–99)
GLUCOSE BLDC GLUCOMTR-MCNC: 148 MG/DL (ref 70–99)
GLUCOSE BLDC GLUCOMTR-MCNC: 154 MG/DL (ref 70–99)
GLUCOSE BLDC GLUCOMTR-MCNC: 161 MG/DL (ref 70–99)
GLUCOSE BLDC GLUCOMTR-MCNC: 176 MG/DL (ref 70–99)
GLUCOSE BLDC GLUCOMTR-MCNC: 180 MG/DL (ref 70–99)
GLUCOSE BLDC GLUCOMTR-MCNC: 190 MG/DL (ref 70–99)
GLUCOSE BLDC GLUCOMTR-MCNC: 195 MG/DL (ref 70–99)
GLUCOSE BLDC GLUCOMTR-MCNC: 199 MG/DL (ref 70–99)
GLUCOSE BLDC GLUCOMTR-MCNC: 203 MG/DL (ref 70–99)
GLUCOSE BLDC GLUCOMTR-MCNC: 204 MG/DL (ref 70–99)
GLUCOSE BLDC GLUCOMTR-MCNC: 206 MG/DL (ref 70–99)
GLUCOSE BLDC GLUCOMTR-MCNC: 207 MG/DL (ref 70–99)
GLUCOSE BLDC GLUCOMTR-MCNC: 210 MG/DL (ref 70–99)
GLUCOSE BLDC GLUCOMTR-MCNC: 213 MG/DL (ref 70–99)
GLUCOSE BLDC GLUCOMTR-MCNC: 217 MG/DL (ref 70–99)
GLUCOSE BLDC GLUCOMTR-MCNC: 219 MG/DL (ref 70–99)
GLUCOSE BLDC GLUCOMTR-MCNC: 233 MG/DL (ref 70–99)
GLUCOSE BLDC GLUCOMTR-MCNC: 238 MG/DL (ref 70–99)
GLUCOSE BLDC GLUCOMTR-MCNC: 255 MG/DL (ref 70–99)
GLUCOSE BLDC GLUCOMTR-MCNC: 267 MG/DL (ref 70–99)
GLUCOSE SERPL-MCNC: 218 MG/DL (ref 70–99)
HCT VFR BLD AUTO: 36.3 % (ref 40–53)
HGB BLD-MCNC: 12.2 G/DL (ref 13.3–17.7)
MCH RBC QN AUTO: 28.4 PG (ref 26.5–33)
MCHC RBC AUTO-ENTMCNC: 33.6 G/DL (ref 31.5–36.5)
MCV RBC AUTO: 84 FL (ref 78–100)
PHOSPHATE SERPL-MCNC: 2.9 MG/DL (ref 2.5–4.5)
PLATELET # BLD AUTO: 279 10E3/UL (ref 150–450)
POTASSIUM SERPL-SCNC: 3.5 MMOL/L (ref 3.4–5.3)
RBC # BLD AUTO: 4.3 10E6/UL (ref 4.4–5.9)
SODIUM SERPL-SCNC: 138 MMOL/L (ref 136–145)
WBC # BLD AUTO: 7.1 10E3/UL (ref 4–11)

## 2023-08-14 PROCEDURE — 250N000013 HC RX MED GY IP 250 OP 250 PS 637

## 2023-08-14 PROCEDURE — 99232 SBSQ HOSP IP/OBS MODERATE 35: CPT | Performed by: PHYSICIAN ASSISTANT

## 2023-08-14 PROCEDURE — 85014 HEMATOCRIT: CPT | Performed by: PHYSICIAN ASSISTANT

## 2023-08-14 PROCEDURE — 82310 ASSAY OF CALCIUM: CPT | Performed by: PHYSICIAN ASSISTANT

## 2023-08-14 PROCEDURE — 99232 SBSQ HOSP IP/OBS MODERATE 35: CPT | Mod: FS | Performed by: NURSE PRACTITIONER

## 2023-08-14 PROCEDURE — 250N000013 HC RX MED GY IP 250 OP 250 PS 637: Performed by: PHYSICIAN ASSISTANT

## 2023-08-14 PROCEDURE — 250N000009 HC RX 250: Performed by: NURSE PRACTITIONER

## 2023-08-14 PROCEDURE — 36415 COLL VENOUS BLD VENIPUNCTURE: CPT | Performed by: PHYSICIAN ASSISTANT

## 2023-08-14 PROCEDURE — 120N000002 HC R&B MED SURG/OB UMMC

## 2023-08-14 PROCEDURE — 250N000013 HC RX MED GY IP 250 OP 250 PS 637: Performed by: CLINICAL NURSE SPECIALIST

## 2023-08-14 PROCEDURE — 84100 ASSAY OF PHOSPHORUS: CPT | Performed by: STUDENT IN AN ORGANIZED HEALTH CARE EDUCATION/TRAINING PROGRAM

## 2023-08-14 PROCEDURE — 250N000013 HC RX MED GY IP 250 OP 250 PS 637: Performed by: STUDENT IN AN ORGANIZED HEALTH CARE EDUCATION/TRAINING PROGRAM

## 2023-08-14 PROCEDURE — 99207 PR APP CREDIT; MD BILLING SHARED VISIT: CPT | Performed by: STUDENT IN AN ORGANIZED HEALTH CARE EDUCATION/TRAINING PROGRAM

## 2023-08-14 PROCEDURE — 250N000011 HC RX IP 250 OP 636: Mod: JZ | Performed by: PHYSICIAN ASSISTANT

## 2023-08-14 RX ORDER — POTASSIUM CHLORIDE 750 MG/1
20 TABLET, EXTENDED RELEASE ORAL ONCE
Status: COMPLETED | OUTPATIENT
Start: 2023-08-14 | End: 2023-08-14

## 2023-08-14 RX ADMIN — INSULIN HUMAN 10 UNITS/HR: 1 INJECTION, SOLUTION INTRAVENOUS at 11:21

## 2023-08-14 RX ADMIN — INSULIN HUMAN 10 UNITS/HR: 1 INJECTION, SOLUTION INTRAVENOUS at 23:02

## 2023-08-14 RX ADMIN — BICTEGRAVIR SODIUM, EMTRICITABINE, AND TENOFOVIR ALAFENAMIDE FUMARATE 1 TABLET: 50; 200; 25 TABLET ORAL at 08:44

## 2023-08-14 RX ADMIN — POLYETHYLENE GLYCOL 3350 17 G: 17 POWDER, FOR SOLUTION ORAL at 08:44

## 2023-08-14 RX ADMIN — POTASSIUM CHLORIDE 20 MEQ: 750 TABLET, EXTENDED RELEASE ORAL at 19:58

## 2023-08-14 RX ADMIN — SENNOSIDES AND DOCUSATE SODIUM 1 TABLET: 50; 8.6 TABLET ORAL at 08:44

## 2023-08-14 RX ADMIN — INSULIN HUMAN 8 UNITS/HR: 1 INJECTION, SOLUTION INTRAVENOUS at 00:15

## 2023-08-14 RX ADMIN — FAMOTIDINE 40 MG: 20 TABLET ORAL at 04:41

## 2023-08-14 RX ADMIN — INSULIN ASPART 6 UNITS: 100 INJECTION, SOLUTION INTRAVENOUS; SUBCUTANEOUS at 12:01

## 2023-08-14 RX ADMIN — SENNOSIDES AND DOCUSATE SODIUM 1 TABLET: 50; 8.6 TABLET ORAL at 19:09

## 2023-08-14 RX ADMIN — SITAGLIPTIN 100 MG: 100 TABLET, FILM COATED ORAL at 08:44

## 2023-08-14 RX ADMIN — INSULIN ASPART 10 UNITS: 100 INJECTION, SOLUTION INTRAVENOUS; SUBCUTANEOUS at 19:58

## 2023-08-14 RX ADMIN — BUPROPION HYDROCHLORIDE 150 MG: 150 TABLET, FILM COATED, EXTENDED RELEASE ORAL at 08:44

## 2023-08-14 RX ADMIN — KETOROLAC TROMETHAMINE 30 MG: 15 INJECTION INTRAMUSCULAR; INTRAVENOUS at 20:08

## 2023-08-14 RX ADMIN — INSULIN ASPART 13 UNITS: 100 INJECTION, SOLUTION INTRAVENOUS; SUBCUTANEOUS at 07:57

## 2023-08-14 ASSESSMENT — ACTIVITIES OF DAILY LIVING (ADL)
ADLS_ACUITY_SCORE: 22
ADLS_ACUITY_SCORE: 24
ADLS_ACUITY_SCORE: 22
ADLS_ACUITY_SCORE: 24
ADLS_ACUITY_SCORE: 22

## 2023-08-14 NOTE — PROGRESS NOTES
Prior Authorization Approval    Medication: BYDUREON BCISE 2 MG/0.85ML SC AUIJ  PA Initiated: 8/14/2023  PA Type: Clinical    Insurance: [employer] - Phone [phone] Fax [phone]  [facility] Key / Reference #: [id number]   Authorization Effective Dates: 8/14/2023 - 8/13/2024    Expected CoPay: 3.00     CoPay Card Eligible: No      Filling Pharmacy: [facility] [place], [place] - [address] [place] [address]  Pharmacy Notified: Yes  Patient Notified: Yes    Addendum 8/16: Endocrine no longer plans to add GLP1 due to allergy to trulicity. Authorization for bydureon deactivated.     Lucy Martin  [facility] Pharm[facility] Liaison  Ph: [phone] Pager: [phone]   Securely message with the Voc[employer] Web Console (learn more here)
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
## 2023-08-14 NOTE — CONSULTS
Discharge Pharmacy Test Claim    Patient's [facility] requires prior authorization for bydureon bcise pens. Pharmacy liaison will work on the request.    Addendum 12:18 pm: received call from [employer] stating bydureon prior authorization approved, $3 copay.    Test Claim Copay   bydureon bcise 3.00     Lucy Martin  [facility] Liaison  Ph: [phone] Pager: [phone]   Securely message with the Vocera Web Console (learn more here)

## 2023-08-14 NOTE — PROGRESS NOTES
Diabetes Consult Daily  Progress Note          Assessment/Plan:     HPI:  DM, housing insecurity, and substance abuse who was admitted sudden onset abdominal pain and vomiting found to have recurrent SBO on 8/7/2023, managed medically.           Assessment:     1.Uncontrolled type 2 diabetes with noncompliance with antihyperglycemics   2.Has allergy to GLP-1 rash on chest/arms, legs     Inpatient Plan:   Discussed pt with endocrine staff, Dr Corona today   - Continue Insulin drip despite giving lantus as below    - Lantus 40 units now, called and spoke to RN, do not stop the drip              - Continue sitagliptin 100 mg daily  ( DDP4)              - Increase Carb coverage Novolog 1:5 ( from 1:6) CHO coverage with meals and snacks/supplements   -order glucose tabs on discharge so if he gets low can treat hypoglycemia if he dopes not have food/juice readily available              - bring food trays to desk, remove bedside snacks              - BG monitoring TID AC, HS, 0200              - hypoglycemia protocol              - strict carb counting protocol     Tentative Discharge Planning: carried over documentation                Stop metformin because it gives you unmanageable diarrhea  Continue sitagliptin (Januvia) 100 mg by mouth daily ( DDP4 drug class)  Will not send out on  Bydureon B-Cise 2 mg every week on Thursdays as previously planned due to history of rash on chest, arms and legs with Trulicity--->Discussed with Endocrine staff--> can reintroduce GLP1 as outpatient if needs another medication--put in pharmacy liaison consult and Prior auth filled out and pending in case this changes  Take Lantus ++ units every evening (will need adjusting based on basal needs)   Check glucose twice per day: once when you wake up in the morning and once in the evening  Patient today says he will take fixed dose Novolog with meals as outpatient but previously he said he did not want to give  himself that many injections so could send out on starting dose of glimepiride 2-4 mg daily and increase as outpatient( he does not ever remember taking sulfonylurea )  Call to schedule a diabetes clinic appt (you have seen Lenora Haley PA-C in the past) [phone] (number given to patient 8/13/2023 so he call call while he has access to phone at hospital)  Discussed with patient again today x2--> asked him to call today please.     Social Work confirmed there is access to refrigeration for medications at [facility].  If something changes, you can reference the information below about storage outside of refrigeration.  Discussed this again with Andrew.  **How to Store BYDUREON BCISE Autoinjector   Store the autoinjector flat in the refrigerator between 36 F to 46 F (2 C to 8 C).   Each autoinjector can be kept at room temperature not to exceed 86 F (30 C) for no more than a total of 4 weeks, if needed.   Store in the packaging provided to protect from light until you are ready to prepare and use your  dose.     HOW TO STORE YOUR OPENED LANTUS SOLOSTAR PEN  After its first use, don t refrigerate the Lantus SoloStar pen. Keep it at room temperature only  (below 86 F) for 28 days.  After 28 days, throw your opened Lantus pen away--even if it still has insulin in it.  Keep Lantus away from direct heat and light.  Don t leave Lantus in a hot car, in direct sun, or in a freezing car. If Lantus gets too hot or freezes, it will have to be thrown away.             Plan discussed with patient in person x2, bedside RN by phone and primary team in person and by [facility]         Diabetes service will continue to follow.  Please do not hesitate to contact the team with any questions/concerns.      Please notify inpatient diabetes service if changes are planned that will impact glycemia, such as NPO, starting/adjusting steroids, enteral feeding, parenteral feeding, dextrose fluids or procedures.               Interval History:      The last 24 hours progress and nursing notes reviewed.    BG running 475 to 531  In the last 14 hours since he started the drip and then was stable around 1900-- he used 110 units in 14 hours which is about 7.8 units/hour which is about 187 units.  Decrease by 20% to 149 units.  Discussed with staff, concern that he is eating all the time here but will eat less when he is discharged due to food availability as he is homeless.  Plan is to give him lantus 40 units now and see his usage.  Will not send out on BYDUREON as his rash was not just injection site related with trulicity.  If start   BYDUREON need to stop sitagliptin.  Planned Procedures/surgeries: none  D5W-containing solutions/medications: none    Recent Labs   Lab 08/14/23  0647 08/14/23  0610 08/14/23  0538 08/14/23  0430 08/14/23  0322 08/14/23  0222   * 135* 145* 238* 204* 233*                 Nutrition:     Orders Placed This Encounter      Diet      Consistent Carbohydrate Diet Moderate Consistent Carb (60 g CHO per Meal) Diet      Tube Feeding:  none        PTA Regimen:     Has been prescribed glimepiride-- but may never have taken  Was prescribed metformin XR 2 gm daily-- but states this gives unmanageable diarrhea  Prescribed sitaglitpin 100 mg-- unknown if taking daily  Not monitoring glucose-- lost meter?          Review of Systems:   Negative except see HPI         Medications:   Steroid planning:  none         Physical Exam:     General:   A&O, NAD, pleasant, resting comfortably. Well nourished   HEENT:  NC/AT. MMM, EOMI, Anicteric. Hearing WNL.   Lungs:  unremarkable, no new cough, no SOB  ABD:   rounded, soft  Extremities:  Moving all extremities, no edema, non-tender.    Skin:  warm and dry, no obvious lesions/rash/bleeding  Neuro:  No focal neurological deficits  Psych:   Cooperative, appropriate mood, good eye contact, congruent affect          Data:     Lab Results   Component Value Date    A1C 13.3 08/06/2023    A1C 11.5  04/18/2023    A1C 12.8 12/07/2022    A1C 12.8 11/17/2022    A1C 11.2 02/18/2022    A1C 9.7 11/11/2020    A1C 8.2 09/25/2020    A1C 8.2 08/27/2020    A1C 11.6 02/11/2020    A1C 12.3 01/07/2020            To contact Endocrine Diabetes service:   From 7AM-5PM: page inpatient diabetes provider who is following the patient that day (see filed or incomplete progress notes/consult notes).  If uncertain of provider assignment: page job code [phone]. (To page job code in-house dial 3 stars, [phone] then enter number).  For questions or updates AFTER HOURS from 5PM-7AM: page the diabetes job code for on call fellow: [phone]    Please notify inpatient diabetes service if changes are planned to steroids, nutrition, or if procedures are planned requiring prolonged NPO status. Diabetes Management Team job code: [phone]       I spent a total of 50 minutes on the date of the encounter doing prep/post-work, chart review, history and exam, documentation and further activities per the note including lab review, multidisciplinary communication, counseling the patient and/or coordinating care regarding acute hyper/hypoglycemic management, as well as discharge management and planning/communication.  See note for details.

## 2023-08-14 NOTE — PROGRESS NOTES
[facility]    Medicine Progress Note - Hospitalist Service, GOLD TEAM 6    Date of Admission:  8/6/2023    Assessment & Plan   Andrew Collier is a 57 year old male with past medical history significant for recurrent SBOs of unclear etiology, hx perforated appendicitis s/p appendectomy, HIV c/b HSV and CNS toxoplasmosis, type 2 DM, housing insecurity, and polysubstance use disorder admitted for sudden onset abdominal pain and vomiting found to have recurrent SBO.      #Recurrent SBO  #Hypokalemia/hypophosphatemia  - Patient presented with acute onset of L sided abdominal pain, nausea, and NBNB vomiting for 1 day. CT showing duodenal wall thickening and mid small bowel obstruction with smooth tapering transition/no suspicious transition point. Evaluated by gen surg in the ED and agree with medical management. 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 Symptoms improving following NGT decompression.   - Gastrograffin study without evidence of obstruction  - ADAT  - Miralax, senna at discharge in hopes to avoid recurrance      # T2DM  # Hyperglycemia  # Ketonuria/glucosuria   Glucose 440 on admit. Low concern for HHS or DKA. Hgb A1c 13% this admission.  On metformin and januvia PTA. Has diarrhea with metformin. Says he has not started the new diabetes medication he was started on last admit (presumably Glimepiride).  Endocrine consulted, appreciate recs.    - See Endo progress notes for recs     # HIV  Unclear who is managing his HIV. Had multiple undetectable viral loads in the past, however last one was 10/2022 and most recent absolute CD4 count is down from prior (330 5/2023 < 526 5/2022). Last ID note found was from 1/2023 and he did not have insurance so had to defer management until insurance obtained again. Follow up due in 4/2023,  "however no note found. Patient reports taking his Biktarvy.   - Will get monitoring labs-> CD4 is 535, viral load 19,900-> detectable viral load may be due to patient not fully adhering to his regimen  - Continue PTA biktarvy  - Needs to re-establish ID follow up -> received message from schedulers that [facility] unable to accept patient due to too many no shows, may need referral outside of our system     # Housing instability   - patient has been staying at [facility] but reports that he has nowhere to go after discharge, discussing with social work     # Hx of IV meth use   - reports ongoing occasional IV meth use though does not go into detail         Diet: Diet  Consistent Carbohydrate Diet Moderate Consistent Carb (60 g CHO per Meal) Diet    DVT Prophylaxis: Pneumatic Compression Devices  Potts Catheter: Not present  Lines: None     Cardiac Monitoring: None  Code Status: Full Code      Clinically Significant Risk Factors                        # DMII: A1C = 13.3 % (Ref range: <5.7 %) within past 6 months   # Overweight: Estimated body mass index is 25.75 kg/m  as calculated from the following:    Height as of this encounter: 1.626 m (5' 4\").    Weight as of this encounter: 68 kg (150 lb).           Disposition Plan      Expected Discharge Date: 08/14/2023      Destination: shelter          The patient's care was discussed with the Attending Physician, Dr. Cortez Kumar .    Roro Gomes, [facility]  Hospitalist Service, GOLD TEAM 6  [facility]  Securely message with [employer] (more info)  Text page via [employer] Paging/Directory   See signed in provider for up to date coverage information  ______________________________________________________________________    Interval History   Andrew is seen in his room.  He is eating breakfast and feeling well.  Denies abdominal pain.      Physical Exam   Vital Signs: Temp: 98.3  F (36.8  C) Temp src: Oral BP: 137/81 Pulse: 92   " Resp: 18 SpO2: 98 % O2 Device: None (Room air)    Weight: 150 lbs 0 oz    GENERAL: Alert and oriented x 3. Well nourished, well developed.  No acute distress.    HEENT: Normocephalic, atraumatic. Anicteric sclera. Mucous membranes moist.   CV: RRR. S1, S2. No murmurs appreciated.   RESPIRATORY: Effort normal on room air. Lungs CTAB with no wheezing, rales, or rhonchi.   GI: Abdomen soft and non distended, bowel sounds present x all 4 quadrants. No tenderness, rebound, or guarding.   NEUROLOGICAL: No focal deficits. Follows commands.  Strength equal in upper and lower extremities.   MUSCULOSKELETAL: No joint swelling or tenderness. Moves all extremities.   EXTREMITIES: No gross deformities. No peripheral edema.   SKIN: Grossly warm, dry, and intact. No jaundice. No rashes.       Medical Decision Making       40 MINUTES SPENT BY ME on the date of service doing chart review, history, exam, documentation & further activities per the note.      Data     I have personally reviewed the following data over the past 24 hrs:    7.1  \   12.2 (L)   / 279     138 105 21.0 /  203 (H)   3.5 20 (L) 0.69 \       Imaging results reviewed over the past 24 hrs:   Recent Results (from the past 24 hour(s))   XR Shoulder Right G/E 3 Views    Narrative    EXAM: XR SHOULDER RIGHT G/E 3 VIEWS  LOCATION: [facility]  DATE: 8/13/2023    INDICATION: right shoulder pain  COMPARISON: None.      Impression    IMPRESSION: Hypertrophic change at the right AC joint. No evidence for fracture. The right glenoid humeral joint is negative.

## 2023-08-14 NOTE — PROGRESS NOTES
CLINICAL NUTRITION SERVICES - BRIEF NOTE     Reviewed nutrition risk factors due to LOS. Pt is tolerating diet with good appetite and intakes per nursing documentation. No nutrition issues identified at this time.      Follow Up / Monitoring:   Per protocol.    Romy Chaves RD/DORA  Pager [phone]

## 2023-08-14 NOTE — PLAN OF CARE
Goal Outcome Evaluation:      Plan of Care Reviewed With: patient    Overall Patient Progress: no changeOverall Patient Progress: no change    Outcome Evaluation: Remains on insuling drip, with carb coverage. Carb coverage remains tricky with patient eating many snaks throughout the day.  Endocrine following and making decisions regarding management of diabetes. Long acting lantus given  Possibility of discontinuing this evening.  Bowel medications given per orders.  Patient hoping to discharge tonight, but more likely will happen tomorrow.

## 2023-08-15 LAB
GLUCOSE BLDC GLUCOMTR-MCNC: 148 MG/DL (ref 70–99)
GLUCOSE BLDC GLUCOMTR-MCNC: 191 MG/DL (ref 70–99)
GLUCOSE BLDC GLUCOMTR-MCNC: 196 MG/DL (ref 70–99)
GLUCOSE BLDC GLUCOMTR-MCNC: 205 MG/DL (ref 70–99)
GLUCOSE BLDC GLUCOMTR-MCNC: 219 MG/DL (ref 70–99)
GLUCOSE BLDC GLUCOMTR-MCNC: 225 MG/DL (ref 70–99)
GLUCOSE BLDC GLUCOMTR-MCNC: 238 MG/DL (ref 70–99)
GLUCOSE BLDC GLUCOMTR-MCNC: 254 MG/DL (ref 70–99)
GLUCOSE BLDC GLUCOMTR-MCNC: 282 MG/DL (ref 70–99)
GLUCOSE BLDC GLUCOMTR-MCNC: 328 MG/DL (ref 70–99)
GLUCOSE BLDC GLUCOMTR-MCNC: 338 MG/DL (ref 70–99)
GLUCOSE BLDC GLUCOMTR-MCNC: 345 MG/DL (ref 70–99)
GLUCOSE BLDC GLUCOMTR-MCNC: 345 MG/DL (ref 70–99)
GLUCOSE BLDC GLUCOMTR-MCNC: 375 MG/DL (ref 70–99)
HOLD SPECIMEN: NORMAL
POTASSIUM SERPL-SCNC: 4 MMOL/L (ref 3.4–5.3)

## 2023-08-15 PROCEDURE — 99232 SBSQ HOSP IP/OBS MODERATE 35: CPT | Mod: FS | Performed by: STUDENT IN AN ORGANIZED HEALTH CARE EDUCATION/TRAINING PROGRAM

## 2023-08-15 PROCEDURE — 83735 ASSAY OF MAGNESIUM: CPT | Performed by: NURSE PRACTITIONER

## 2023-08-15 PROCEDURE — 99207 PR APP CREDIT; MD BILLING SHARED VISIT: CPT | Performed by: NURSE PRACTITIONER

## 2023-08-15 PROCEDURE — 84132 ASSAY OF SERUM POTASSIUM: CPT | Performed by: STUDENT IN AN ORGANIZED HEALTH CARE EDUCATION/TRAINING PROGRAM

## 2023-08-15 PROCEDURE — 250N000013 HC RX MED GY IP 250 OP 250 PS 637: Performed by: PHYSICIAN ASSISTANT

## 2023-08-15 PROCEDURE — 120N000002 HC R&B MED SURG/OB UMMC

## 2023-08-15 PROCEDURE — 99233 SBSQ HOSP IP/OBS HIGH 50: CPT | Performed by: NURSE PRACTITIONER

## 2023-08-15 PROCEDURE — 36415 COLL VENOUS BLD VENIPUNCTURE: CPT | Performed by: STUDENT IN AN ORGANIZED HEALTH CARE EDUCATION/TRAINING PROGRAM

## 2023-08-15 PROCEDURE — 250N000013 HC RX MED GY IP 250 OP 250 PS 637

## 2023-08-15 PROCEDURE — 250N000013 HC RX MED GY IP 250 OP 250 PS 637: Performed by: CLINICAL NURSE SPECIALIST

## 2023-08-15 PROCEDURE — 250N000011 HC RX IP 250 OP 636: Performed by: PHYSICIAN ASSISTANT

## 2023-08-15 RX ORDER — BENZONATATE 100 MG/1
100 CAPSULE ORAL 3 TIMES DAILY PRN
Status: DISCONTINUED | OUTPATIENT
Start: 2023-08-15 | End: 2023-08-16 | Stop reason: HOSPADM

## 2023-08-15 RX ORDER — CALCIUM CARBONATE 500 MG/1
500 TABLET, CHEWABLE ORAL DAILY PRN
Status: DISCONTINUED | OUTPATIENT
Start: 2023-08-15 | End: 2023-08-16 | Stop reason: HOSPADM

## 2023-08-15 RX ADMIN — KETOROLAC TROMETHAMINE 30 MG: 15 INJECTION INTRAMUSCULAR; INTRAVENOUS at 20:21

## 2023-08-15 RX ADMIN — SENNOSIDES AND DOCUSATE SODIUM 1 TABLET: 50; 8.6 TABLET ORAL at 08:18

## 2023-08-15 RX ADMIN — POLYETHYLENE GLYCOL 3350 17 G: 17 POWDER, FOR SOLUTION ORAL at 08:18

## 2023-08-15 RX ADMIN — SENNOSIDES AND DOCUSATE SODIUM 1 TABLET: 50; 8.6 TABLET ORAL at 19:42

## 2023-08-15 RX ADMIN — INSULIN ASPART 15 UNITS: 100 INJECTION, SOLUTION INTRAVENOUS; SUBCUTANEOUS at 08:56

## 2023-08-15 RX ADMIN — BICTEGRAVIR SODIUM, EMTRICITABINE, AND TENOFOVIR ALAFENAMIDE FUMARATE 1 TABLET: 50; 200; 25 TABLET ORAL at 08:18

## 2023-08-15 RX ADMIN — INSULIN ASPART 6 UNITS: 100 INJECTION, SOLUTION INTRAVENOUS; SUBCUTANEOUS at 19:46

## 2023-08-15 RX ADMIN — FAMOTIDINE 40 MG: 20 TABLET ORAL at 05:17

## 2023-08-15 RX ADMIN — SITAGLIPTIN 100 MG: 100 TABLET, FILM COATED ORAL at 08:18

## 2023-08-15 RX ADMIN — INSULIN ASPART 25 UNITS: 100 INJECTION, SOLUTION INTRAVENOUS; SUBCUTANEOUS at 11:02

## 2023-08-15 ASSESSMENT — ACTIVITIES OF DAILY LIVING (ADL)
ADLS_ACUITY_SCORE: 24

## 2023-08-15 NOTE — PROGRESS NOTES
Care Management Follow Up      CHW provided the pt with a T-shirt, sweater and sweat pants on the behalf of Care Management.     Jose Ramon Raymundo   Inpatient Community Health Worker and Mo[place]   [facility]

## 2023-08-15 NOTE — PLAN OF CARE
Goal Outcome Evaluation:      Plan of Care Reviewed With: patient    Overall Patient Progress: improvingOverall Patient Progress: improving       Assessment/Interventions:      Vitals: stable on room air    Pain/Comfort: complained of right shoulder pain and received one dose of IV Toradol    Activity/mobility: independent     Neuro status: intact    Respiratory: WNL    Cardiac:WNL    GI/: WNL    Lab/Tests Results (Electrolyte protocol/DM): potassium was 3.5, replaced per high electrolyte protocol, recheck in the AM    Lines/Draines: one PIV in the right arm infusing insulin gtt    Skin integrity/wounds: WNL    Nutrition: Mod Cho diet with carb coverage. Pt likes to snack.     How patient takes medications: orally     Events during shift: pt on an insulin gtt, algorithm 4 . BGs have maintained under 200 this evening.     plan: Possible discharge to shelter tomorrow

## 2023-08-15 NOTE — PLAN OF CARE
Goal Outcome Evaluation:      Plan of Care Reviewed With: patient    Overall Patient Progress: no changeOverall Patient Progress: no change    Outcome Evaluation: Glucose improved this  morning.  Discontinued insulin gtt at 1030 and switched to sub cutaneous management. Spot check bg at 1410 was 218.  Will need to monitor and cover for ALL snack carbs.  Patient hoping to discharge soon, maybe today.  No word from primary team as to what the plan is.

## 2023-08-15 NOTE — PROGRESS NOTES
Diabetes Consult Daily  Progress Note          Assessment/Plan:     HPI:  Andrew Collier was admitted on 8/7/2023. 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     Assessment:      1.Uncontrolled type 2 diabetes with noncompliance with antihyperglycemics   2.Has hx of mild allergy to GLP-1 (trulicity) rash on chest/arms, legs     Inpatient Plan:          - Planning to transition off drip - please see rationale in interval hx              - Lantus 50 units x1 this AM at 0800 - and discharge with wt based (38 units daily)              - Sitagliptin 100 mg daily  (DPP4)              - Novolog 1:5 g cho coverage with meals and snacks/supplements - discharge out on sulfonyurea              - Glucose tabs on discharge since he has both food AND housing instability              - bring food trays to desk, remove bedside snacks, limit intake as much as able               - BG monitoring TID AC, HS, 0200              - hypoglycemia protocol              - strict carb counting protocol   - Ideally off drip and discharge later this evening - IDS to sign off once reasonable stability achieved today and ready for discharge     Recommendations for Discharge:    Instructions to patient were posted in AVS and discussed   Medications and supplies are to be ordered by primary service on discharge.   If there are problems or issues with ordering for discharge, you may contact the pharmacist directly for assistance   *please use the DIAB non-branded discharge supply order set ([id number])*     -blood glucose monitoring three times daily before meals and at bedtime - keep a good journal so when you follow up in the clinic they are able to  "assist and adjust meds  -Glargine (Lantus, or Basaglar, per insurance coverage) 38 units daily in the morning (0800)   -Januvia 100 mg each day in the AM   -Glimepiride 8 mg - this was started instead of the meal insulin since you don't want to take that many injections AND you have taken it in the past    -The Bydureon was not started because of your rashes and hx of rashes from Trulicity in the past that you reported were bothersome    -Close follow up in clinic so if you change your mind, any of this can be adjusted for you.     -follow up with [facility] in 1-2 weeks after discharge (appointment request sent to clinic coordinator on 08/15/23)     -upon discharge, patient will need the following supplies: Januvia, Glimipiride, Lantus Solostar pens, \"BD\" (32G x 4mm) insulin pen needles, glucometer, lancets, and test strips (if brand of meter not known, can be ordered \"no brand specified\" and note to pharmacy: \"can substitute per insurance coverage\"), sharps container, alcohol swabs.   **Glucose tabs       Plan discussed with patient, bedside RN/primary team via this note.         Interval History:     The last 24 hours progress and nursing notes reviewed.      Extreme escalation on drip up to staggering rate of 22 unit(s) per hour  RN overnight \"gave up\" on covering cho intake as patient was making frequent trips to the [facility] and there is minimal food left  He is telling staff he is going to eat as much as he can while he is here because he doesn't know what or when he will be able to eat again once leaves.    This has been the key issue while during this in-patient stay - balancing optimization of BG control as it appears here vs actual needs and reality of what he will need once outpatient and once again faced with homelessness and food insecurity.    The significant risk of IDS optimizing him here is that his intake will drop off so dramatically that is will not be a safe plan at all.     8:12 " "AM  RN returns a call and clarifies the drip was actually at 8 unit(s) per hour all night so he has 40 unit(s) of lantus + 8 unit(s) of IV insulin or 192 unit(s) in 24 hours.   Since he will not be leaving with either a full fridge of food nor IV insulin, will calculate a dose based on wt and add 25% based on his needs which will end up being safer than calculating dosing based on actual rates we are seeing.     BG trend:          More realistic trending and now with lantus 50 unit(s) on board.         Will connect with team, share plan as outlined above.  Need stability through today with focus on safety and ideally able to discharge this evening.     *Glucose tabs for discharge a must.     Recent Labs   Lab 08/15/23  0625 08/15/23  0520 08/15/23  0446 08/15/23  0345 08/15/23  0239 08/15/23  0131   * 338* 345* 328* 282* 238*           Nutrition:     Orders Placed This Encounter      Diet      Consistent Carbohydrate Diet Moderate Consistent Carb (60 g CHO per Meal) Diet        PTA Regimen:     Varied complaince with prescribed regimen:  Has been prescribed glimepiride-- but may never have taken  Was prescribed metformin XR 2 gm daily-- but states this gives unmanageable diarrhea  Prescribed sitaglitpin 100 mg-- unknown if taking daily  Not monitoring glucose-- lost meter?          Review of Systems:   CC: some chronic neuropathy to LE          Medications:   Steroid planning:  none       Physical Exam:   BP (!) 160/88   Pulse 87   Temp 98.3  F (36.8  C) (Oral)   Resp 16   Ht 1.626 m (5' 4\")   Wt 75.1 kg (165 lb 8 oz)   SpO2 100%   BMI 28.41 kg/m      General:   A&O, NAD, pleasant, resting comfortably. Well nourished   HEENT:  NC/AT. MMM, EOMI, Anicteric. Hearing WNL.   Lungs:  unremarkable, no new cough, no SOB  ABD:   rounded, soft  Extremities:  Moving all extremities  Skin:  warm and dry, no obvious lesions/rash/bleeding  Neuro:  No focal neurological deficits  Psych:   Cooperative, appropriate " mood, good eye contact, congruent affect          Data:     Lab Results   Component Value Date    A1C 13.3 08/06/2023    A1C 11.5 04/18/2023    A1C 12.8 12/07/2022    A1C 12.8 11/17/2022    A1C 11.2 02/18/2022    A1C 9.7 11/11/2020    A1C 8.2 09/25/2020    A1C 8.2 08/27/2020    A1C 11.6 02/11/2020    A1C 12.3 01/07/2020        CBC RESULTS:   Recent Labs   Lab Test 08/14/23  1000   WBC 7.1   RBC 4.30*   HGB 12.2*   HCT 36.3*   MCV 84   MCH 28.4   MCHC 33.6   RDW 13.3        Recent Labs   Lab Test 08/15/23  0625 08/15/23  0520 08/14/23  1054 08/14/23  1000 08/13/23  1224 08/13/23  0825   NA  --   --   --  138  --  131*   POTASSIUM  --   --   --  3.5  --  4.0  4.0   CHLORIDE  --   --   --  105  --  98   CO2  --   --   --  20*  --  19*   ANIONGAP  --   --   --  13  --  14   * 338*   < > 218*   < > 562*   BUN  --   --   --  21.0  --  14.2   CR  --   --   --  0.69  --  0.65*   LINDA  --   --   --  8.9  --  9.0    < > = values in this interval not displayed.     Liver Function Studies -   Recent Labs   Lab Test 08/06/23  2243   PROTTOTAL 8.0   ALBUMIN 4.5   BILITOTAL 0.5   ALKPHOS 192*   AST 27   ALT 14     Lab Results   Component Value Date    INR 0.91 08/06/2023    INR 1.00 03/28/2023    INR 0.98 02/06/2023    INR 0.95 12/23/2022    INR 0.97 12/17/2022    INR 1.07 12/08/2022    INR 1.44 06/05/2022    INR 0.96 04/29/2022    INR 1.04 01/22/2022    INR 1.00 12/19/2020    INR 1.12 03/07/2020    INR 1.04 02/14/2020    INR 1.03 10/11/2019    INR 1.06 02/05/2018    INR 0.98 01/01/2018    INR 0.91 11/06/2017    INR 1.00 05/26/2017    INR 1.05 12/26/2016    INR 1.03 11/06/2016    INR 1.14 07/25/2016    INR 1.10 05/10/2016     VIKASH Dunn CNP, BC-ADM  Inpatient Diabetes Management Service  Pager - [phone]  Available on Vocera     To contact Endocrine Diabetes service:   From 7AM-5PM: page inpatient diabetes provider who is following the patient that day (see filed or incomplete progress notes/consult  notes).  If uncertain of provider assignment: page job code [phone]. (To page job code in-house dial 3 stars, [phone] then enter number).  For questions or updates AFTER HOURS from 5PM-7AM: page the diabetes job code for on call fellow: [phone]    Please notify inpatient diabetes service if changes are planned to steroids, nutrition, or if procedures are planned requiring prolonged NPO status. Diabetes Management Team job code: [phone]       I spent a total of >50 minutes on the date of the encounter doing prep/post-work, chart review, history and exam, documentation and further activities per the note including lab review, multidisciplinary communication, counseling the patient and/or coordinating care regarding acute hyper/hypoglycemic management, as well as discharge management and planning/communication.  See note for details.

## 2023-08-15 NOTE — PROGRESS NOTES
[facility]    Medicine Progress Note - Hospitalist Service, GOLD TEAM 6    Date of Admission:  8/6/2023    Assessment & Plan   Andrew Collier is a 57 year old male with past medical history significant for recurrent SBOs of unclear etiology, hx perforated appendicitis s/p appendectomy, HIV c/b HSV and CNS toxoplasmosis, type 2 DM, housing insecurity, and polysubstance use disorder admitted for sudden onset abdominal pain and vomiting found to have recurrent SBO.      #Recurrent SBO  #Hypokalemia/hypophosphatemia  Resolved.  Patient presented with acute onset of L sided abdominal pain, nausea, and NBNB vomiting for 1 day. CT showing duodenal wall thickening and mid small bowel obstruction with smooth tapering transition/no suspicious transition point. Evaluated by gen surg in the ED and agree with medical management. 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 Symptoms improving following NGT decompression.   - Tolerating diet, no further issues     # T2DM  # Hyperglycemia  # Ketonuria/glucosuria   Glucose 440 on admit. Low concern for HHS or DKA. Hgb A1c 13% this admission.  On metformin and januvia PTA. Has diarrhea with metformin. Says he has not started the new diabetes medication he was started on last admit (presumably Glimepiride).  Endocrine consulted, appreciate recs.  BGs last 24 hr:   Recent Labs   Lab 08/15/23  1411 08/15/23  1043 08/15/23  1002 08/15/23  0849 08/15/23  0747 08/15/23  0625   * 148* 196* 225* 345* 375*   - Endocrine following, appreciate recs   - Insulin gtt stopped today   - Lantus 50 units today, then start weight based dose 38 units tomorrow 8/16  - Januvia 100mg daily  - Glimepiride 8mg daily to start at discharge   - Follow up outpatient w/ Endo in 1-2 weeks   - Continue BG checks AC/HS     #  "HIV  Unclear who is managing his HIV. Had multiple undetectable viral loads in the past, however last one was 10/2022 and most recent absolute CD4 count is down from prior (330 5/2023 < 526 5/2022). Last ID note found was from 1/2023 and he did not have insurance so had to defer management until insurance obtained again. Follow up due in 4/2023, however no note found. Patient reports taking his Biktarvy.   - Will get monitoring labs-> CD4 is 535, viral load 19,900-> detectable viral load may be due to patient not fully adhering to his regimen  - Continue PTA biktarvy  - Needs to re-establish ID follow up -> received message from schedulers that [facility] unable to accept patient due to too many no shows, may need referral outside of our system     # Housing instability   - patient has been staying at [facility] but reports that he has nowhere to go after discharge, discussing with social work     # Hx of IV meth use   - reports ongoing occasional IV meth use though does not go into detail         Diet: Diet  Consistent Carbohydrate Diet Moderate Consistent Carb (60 g CHO per Meal) Diet    DVT Prophylaxis: Pneumatic Compression Devices  Potts Catheter: Not present  Lines: None     Cardiac Monitoring: None  Code Status: Full Code      Clinically Significant Risk Factors                        # DMII: A1C = 13.3 % (Ref range: <5.7 %) within past 6 months   # Overweight: Estimated body mass index is 28.41 kg/m  as calculated from the following:    Height as of this encounter: 1.626 m (5' 4\").    Weight as of this encounter: 75.1 kg (165 lb 8 oz).           Disposition Plan      Expected Discharge Date: 08/15/2023      Destination: shelter          The patient's care was discussed with the Attending Physician, Dr. Cortez Kumar .    Roro Gomes CNP  Hospitalist Service, GOLD TEAM [address] [place] [address]  Securely message with [employer] (more info)  Text page via [employer] " Paging/Directory   See signed in provider for up to date coverage information  ______________________________________________________________________    Interval History   Andrew is seen in his room.  He is eating breakfast and feeling well.  Denies abdominal pain.  No further nausea or vomiting. Up and about, independent.     Physical Exam   Vital Signs: Temp: 98.1  F (36.7  C) Temp src: Oral BP: (!) 145/82 Pulse: 95   Resp: 16 SpO2: 98 % O2 Device: None (Room air)    Weight: 165 lbs 8 oz    GENERAL: Alert and oriented x 3. Well nourished, well developed.  Petite body habitus. No acute distress.    HEENT: Normocephalic, atraumatic. Anicteric sclera. Mucous membranes moist.   CV: RRR. S1, S2. No murmurs appreciated.   RESPIRATORY: Effort normal on room air. Lungs CTAB with no wheezing, rales, or rhonchi.   GI: Abdomen soft and non distended, bowel sounds present x all 4 quadrants. No tenderness, rebound, or guarding.   NEUROLOGICAL: No focal deficits. Follows commands.  Strength equal in upper and lower extremities.   MUSCULOSKELETAL: No joint swelling or tenderness. Moves all extremities.   EXTREMITIES: No gross deformities. No peripheral edema.   SKIN: Grossly warm, dry, and intact. No jaundice. No rashes.       Medical Decision Making       40 MINUTES SPENT BY ME on the date of service doing chart review, history, exam, documentation & further activities per the note.      Data     I have personally reviewed the following data over the past 24 hrs:    N/A  \   N/A   / N/A     N/A N/A N/A /  219 (H)   4.0 N/A N/A \     Imaging results reviewed over the past 24 hrs:   No results found for this or any previous visit (from the past 24 hour(s)).
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
## 2023-08-15 NOTE — PLAN OF CARE
"BP (!) 160/88   Pulse 87   Temp 98.3  F (36.8  C) (Oral)   Resp 16   Ht 1.626 m (5' 4\")   Wt 75.1 kg (165 lb 8 oz)   SpO2 100%   BMI 28.41 kg/m        Goal Outcome Evaluation:    Pt slept intermittently throughout the night. VSS on room air, afebrile. Denies pain and nausea. Pt's blood glucose levels have been trending upward overnight. Pt admits to snacking but is extremely vague about what and how much he is eating when asked by writer. Writer has been reluctant to cover with sliding scale due to pt not being able to accurately recall what and how much he has been eating. Insulin currently running at 22 units per hour. Pt c/o heartburn this morning, scheduled famotidine given. Continue with current POC.    "

## 2023-08-16 ENCOUNTER — DOCUMENTATION ONLY (OUTPATIENT)
Dept: MEDSURG UNIT | Facility: CLINIC | Age: 58
End: 2023-08-16
Payer: COMMERCIAL

## 2023-08-16 VITALS
SYSTOLIC BLOOD PRESSURE: 140 MMHG | WEIGHT: 165.5 LBS | BODY MASS INDEX: 28.25 KG/M2 | RESPIRATION RATE: 16 BRPM | HEIGHT: 64 IN | DIASTOLIC BLOOD PRESSURE: 87 MMHG | HEART RATE: 101 BPM | OXYGEN SATURATION: 98 % | TEMPERATURE: 98.3 F

## 2023-08-16 LAB
GLUCOSE BLDC GLUCOMTR-MCNC: 266 MG/DL (ref 70–99)
GLUCOSE BLDC GLUCOMTR-MCNC: 305 MG/DL (ref 70–99)
GLUCOSE BLDC GLUCOMTR-MCNC: 394 MG/DL (ref 70–99)
MAGNESIUM SERPL-MCNC: 1.6 MG/DL (ref 1.7–2.3)

## 2023-08-16 PROCEDURE — 99207 PR APP CREDIT; MD BILLING SHARED VISIT: CPT | Performed by: STUDENT IN AN ORGANIZED HEALTH CARE EDUCATION/TRAINING PROGRAM

## 2023-08-16 PROCEDURE — 99232 SBSQ HOSP IP/OBS MODERATE 35: CPT | Performed by: NURSE PRACTITIONER

## 2023-08-16 PROCEDURE — 250N000011 HC RX IP 250 OP 636: Performed by: PHYSICIAN ASSISTANT

## 2023-08-16 PROCEDURE — 250N000013 HC RX MED GY IP 250 OP 250 PS 637: Performed by: PHYSICIAN ASSISTANT

## 2023-08-16 PROCEDURE — 250N000012 HC RX MED GY IP 250 OP 636 PS 637: Performed by: NURSE PRACTITIONER

## 2023-08-16 PROCEDURE — 250N000013 HC RX MED GY IP 250 OP 250 PS 637

## 2023-08-16 PROCEDURE — 250N000013 HC RX MED GY IP 250 OP 250 PS 637: Performed by: NURSE PRACTITIONER

## 2023-08-16 PROCEDURE — 250N000013 HC RX MED GY IP 250 OP 250 PS 637: Performed by: CLINICAL NURSE SPECIALIST

## 2023-08-16 PROCEDURE — 250N000009 HC RX 250: Performed by: NURSE PRACTITIONER

## 2023-08-16 PROCEDURE — 250N000013 HC RX MED GY IP 250 OP 250 PS 637: Performed by: INTERNAL MEDICINE

## 2023-08-16 PROCEDURE — 99239 HOSP IP/OBS DSCHRG MGMT >30: CPT | Mod: FS | Performed by: NURSE PRACTITIONER

## 2023-08-16 RX ORDER — IBUPROFEN 200 MG
200-400 TABLET ORAL EVERY 6 HOURS PRN
Status: DISCONTINUED | OUTPATIENT
Start: 2023-08-16 | End: 2023-08-16 | Stop reason: HOSPADM

## 2023-08-16 RX ORDER — FAMOTIDINE 40 MG/1
40 TABLET, FILM COATED ORAL DAILY
Qty: 30 TABLET | Refills: 2 | Status: SHIPPED | OUTPATIENT
Start: 2023-08-16 | End: 2024-04-10

## 2023-08-16 RX ORDER — GLIMEPIRIDE 4 MG/1
8 TABLET ORAL
Qty: 180 TABLET | Refills: 2 | Status: SHIPPED | OUTPATIENT
Start: 2023-08-16 | End: 2023-11-09

## 2023-08-16 RX ORDER — GLIMEPIRIDE 4 MG/1
8 TABLET ORAL
Qty: 60 TABLET | Refills: 2 | Status: ON HOLD | OUTPATIENT
Start: 2023-08-16 | End: 2023-09-24

## 2023-08-16 RX ORDER — MAGNESIUM OXIDE 400 MG/1
800 TABLET ORAL ONCE
Status: COMPLETED | OUTPATIENT
Start: 2023-08-16 | End: 2023-08-16

## 2023-08-16 RX ORDER — BUPROPION HYDROCHLORIDE 150 MG/1
150 TABLET ORAL DAILY
Qty: 30 TABLET | Refills: 0 | Status: SHIPPED | OUTPATIENT
Start: 2023-08-16 | End: 2023-11-09

## 2023-08-16 RX ORDER — BICTEGRAVIR SODIUM, EMTRICITABINE, AND TENOFOVIR ALAFENAMIDE FUMARATE 50; 200; 25 MG/1; MG/1; MG/1
1 TABLET ORAL DAILY
Qty: 30 TABLET | Refills: 2 | Status: ON HOLD | OUTPATIENT
Start: 2023-08-17 | End: 2023-11-12

## 2023-08-16 RX ORDER — LIDOCAINE 40 MG/G
CREAM TOPICAL 2 TIMES DAILY PRN
Status: COMPLETED | OUTPATIENT
Start: 2023-08-16 | End: 2023-08-16

## 2023-08-16 RX ADMIN — INSULIN ASPART 16 UNITS: 100 INJECTION, SOLUTION INTRAVENOUS; SUBCUTANEOUS at 12:26

## 2023-08-16 RX ADMIN — FAMOTIDINE 40 MG: 20 TABLET ORAL at 04:39

## 2023-08-16 RX ADMIN — INSULIN ASPART 22 UNITS: 100 INJECTION, SOLUTION INTRAVENOUS; SUBCUTANEOUS at 07:46

## 2023-08-16 RX ADMIN — POLYETHYLENE GLYCOL 3350 17 G: 17 POWDER, FOR SOLUTION ORAL at 07:42

## 2023-08-16 RX ADMIN — BICTEGRAVIR SODIUM, EMTRICITABINE, AND TENOFOVIR ALAFENAMIDE FUMARATE 1 TABLET: 50; 200; 25 TABLET ORAL at 07:42

## 2023-08-16 RX ADMIN — LIDOCAINE: 40 CREAM TOPICAL at 09:34

## 2023-08-16 RX ADMIN — ALUMINUM HYDROXIDE, MAGNESIUM HYDROXIDE, AND SIMETHICONE 15 ML: 200; 200; 20 SUSPENSION ORAL at 00:56

## 2023-08-16 RX ADMIN — SITAGLIPTIN 100 MG: 100 TABLET, FILM COATED ORAL at 07:42

## 2023-08-16 RX ADMIN — KETOROLAC TROMETHAMINE 30 MG: 15 INJECTION INTRAMUSCULAR; INTRAVENOUS at 12:36

## 2023-08-16 RX ADMIN — MAGNESIUM OXIDE TAB 400 MG (241.3 MG ELEMENTAL MG) 800 MG: 400 (241.3 MG) TAB at 12:36

## 2023-08-16 ASSESSMENT — ACTIVITIES OF DAILY LIVING (ADL)
ADLS_ACUITY_SCORE: 24

## 2023-08-16 NOTE — PROGRESS NOTES
Diabetes Consult Daily  Progress Note          Assessment/Plan:     HPI:  Andrew Collier was admitted on 8/7/2023. 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     All recommendations are in - please reach out if there are any changes to the plan     Assessment:      1.Uncontrolled type 2 diabetes with noncompliance with antihyperglycemics   2.Has hx of mild allergy to GLP-1 (trulicity) rash on chest/arms, legs     Inpatient Plan:                        - Lantus 50 units x1 again this AM at 0800 - and discharge plan remains to discharge with wt based (38 units daily)              - Sitagliptin 100 mg daily  (DPP4)              - Novolog 1:4 g cho coverage with meals and snacks/supplements - discharge out on sulfonyurea              - Glucose tabs on discharge since he has both food AND housing instability              - bring food trays to desk, remove bedside snacks, limit intake as much as able               - BG monitoring TID AC, HS, 0200              - hypoglycemia protocol              - strict carb counting protocol   - Ideally off drip and discharge later this evening - IDS to sign off once reasonable stability achieved today and ready for discharge     Recommendations for Discharge:    Instructions to patient were posted in AVS and discussed   Medications and supplies are to be ordered by primary service on discharge.   If there are problems or issues with ordering for discharge, you may contact the pharmacist directly for assistance   *please use the DIAB non-branded discharge supply order set ([id number])*     -blood glucose monitoring three times daily before meals and at bedtime - keep a good journal so  "when you follow up in the clinic they are able to assist and adjust meds  -Glargine (Lantus, or Basaglar, per insurance coverage) 38 units daily in the morning (0800)   -Januvia 100 mg each day in the AM   -Glimepiride 8 mg - this was started instead of the meal insulin since you don't want to take that many injections AND you have taken it in the past.  Take 8 mg when you are eating well and regularly, take 4 mg when you have less access to food and eating less.    -The Bydureon was not started because of your rashes and hx of rashes from Trulicity in the past that you reported were bothersome    -Close follow up in clinic so if you change your mind, any of this can be adjusted for you.     -follow up with [facility] in 1-2 weeks after discharge (appointment request sent to clinic coordinator on 08/15/23)     -upon discharge, patient will need the following supplies: Januvia, Glimipiride, Lantus Solostar pens, \"BD\" (32G x 4mm) insulin pen needles, glucometer, lancets, and test strips (if brand of meter not known, can be ordered \"no brand specified\" and note to pharmacy: \"can substitute per insurance coverage\"), sharps container, alcohol swabs.   **Glucose tabs     Inpatient Diabetes Service Signing off 8/16//23  Please call back with any questions or if BG become more labile or if getting closer to discharge and assistance is needed for home recommendations.    Please allow at least 24 hours for home recs if they are not provided.       Plan discussed bedside RN/primary team via this note.         Interval History:     The last 24 hours progress and nursing notes reviewed.      Decent trend considering yesterday had lantus 40 unit(s) + up to 22 unit(s) on the drip per hour.     Carry over documentation:  Key issue while during this in-patient stay - balancing optimization of BG control as it appears here vs actual needs and reality of what he will need once outpatient and once again faced with " "homelessness and food insecurity.    The significant risk of IDS optimizing him here is that his intake will drop off so dramatically that is will not be a safe plan at all.   Co-management with medicine yesterday and today - teams are in agreements about safety and to plan for outpatient reality vs inpatient parameters.     Eating a lot which is reflected again in this AM BG.   Will not adjust lantus further as he will be leaving and not have readily available access to ongoing food supply as he has.         Recent Labs   Lab 08/16/23  0205 08/15/23  2145 08/15/23  1835 08/15/23  1411 08/15/23  1043 08/15/23  1002   * 205* 254* 219* 148* 196*           Nutrition:     Orders Placed This Encounter      Diet      Consistent Carbohydrate Diet Moderate Consistent Carb (60 g CHO per Meal) Diet        PTA Regimen:     Varied complaince with prescribed regimen:  Has been prescribed glimepiride-- but may never have taken  Was prescribed metformin XR 2 gm daily-- but states this gives unmanageable diarrhea  Prescribed sitaglitpin 100 mg-- unknown if taking daily  Not monitoring glucose-- lost meter?          Review of Systems:   CC: deferred           Medications:   Steroid planning:  none       Physical Exam:   BP (!) 142/87 (BP Location: Right arm)   Pulse 95   Temp 98  F (36.7  C) (Oral)   Resp 16   Ht 1.626 m (5' 4\")   Wt 75.1 kg (165 lb 8 oz)   SpO2 98%   BMI 28.41 kg/m           Data:     Lab Results   Component Value Date    A1C 13.3 08/06/2023    A1C 11.5 04/18/2023    A1C 12.8 12/07/2022    A1C 12.8 11/17/2022    A1C 11.2 02/18/2022    A1C 9.7 11/11/2020    A1C 8.2 09/25/2020    A1C 8.2 08/27/2020    A1C 11.6 02/11/2020    A1C 12.3 01/07/2020        CBC RESULTS:   Recent Labs   Lab Test 08/14/23  1000   WBC 7.1   RBC 4.30*   HGB 12.2*   HCT 36.3*   MCV 84   MCH 28.4   MCHC 33.6   RDW 13.3          Recent Labs   Lab Test 08/15/23  0625 08/15/23  0520 08/14/23  1054 08/14/23  1000 08/13/23  1224 " 08/13/23  0825   NA  --   --   --  138  --  131*   POTASSIUM  --   --   --  3.5  --  4.0  4.0   CHLORIDE  --   --   --  105  --  98   CO2  --   --   --  20*  --  19*   ANIONGAP  --   --   --  13  --  14   * 338*   < > 218*   < > 562*   BUN  --   --   --  21.0  --  14.2   CR  --   --   --  0.69  --  0.65*   LINDA  --   --   --  8.9  --  9.0    < > = values in this interval not displayed.     Liver Function Studies -   Recent Labs   Lab Test 08/06/23  2243   PROTTOTAL 8.0   ALBUMIN 4.5   BILITOTAL 0.5   ALKPHOS 192*   AST 27   ALT 14     Lab Results   Component Value Date    INR 0.91 08/06/2023    INR 1.00 03/28/2023    INR 0.98 02/06/2023    INR 0.95 12/23/2022    INR 0.97 12/17/2022    INR 1.07 12/08/2022    INR 1.44 06/05/2022    INR 0.96 04/29/2022    INR 1.04 01/22/2022    INR 1.00 12/19/2020    INR 1.12 03/07/2020    INR 1.04 02/14/2020    INR 1.03 10/11/2019    INR 1.06 02/05/2018    INR 0.98 01/01/2018    INR 0.91 11/06/2017    INR 1.00 05/26/2017    INR 1.05 12/26/2016    INR 1.03 11/06/2016    INR 1.14 07/25/2016    INR 1.10 05/10/2016     Dalila Philippe, VIKASH CNP, [facility]  Inpatient Diabetes Management Service  Pager - [phone]  Available on [employer]     To contact Endocrine Diabetes service:   From 7AM-5PM: page inpatient diabetes provider who is following the patient that day (see filed or incomplete progress notes/consult notes).  If uncertain of provider assignment: page job code [phone]. (To page job code in-house dial 3 stars, 777 then enter number).  For questions or updates AFTER HOURS from 5PM-7AM: page the diabetes job code for on call fellow: [phone]    Please notify inpatient diabetes service if changes are planned to steroids, nutrition, or if procedures are planned requiring prolonged NPO status. Diabetes Management Team job code: [phone]       I spent a total of 35 minutes on the date of the encounter doing prep/post-work, chart review, history and exam, documentation and further activities  per the note including lab review, multidisciplinary communication, counseling the patient and/or coordinating care regarding acute hyper/hypoglycemic management, as well as discharge management and planning/communication.  See note for details.

## 2023-08-16 NOTE — PROGRESS NOTES
"Care Management Discharge Note    Discharge Date: 08/16/2023       Discharge Disposition: Shelter    Discharge Services: Transportation Services    Discharge DME: None    Discharge Transportation: public transportation    Private pay costs discussed: Not applicable    Does the patient's insurance plan have a 3 day qualifying hospital stay waiver?  No    PAS Confirmation Code:  N/A  Patient/family educated on Medicare website which has current facility and service quality ratings: no    Education Provided on the Discharge Plan: Yes  Persons Notified of Discharge Plans: Patient, provider, nursing staff, SW  Patient/Family in Agreement with the Plan: yes    Handoff Referral Completed: Yes    Additional Information:  SW met with patient at bedside to re approach the topic of discharge disposition. SW asked where the patient plans on discharging to and if he needs a ride. Patient reported that he will need transportation arranged back to [facility] so he \"can check on some things\".     SW met with patient again and asked if SW setting up transportation around noon would be okay. Patient stated \"well there are more tests that need to be done and why was I told that you would be able to find me housing.\" SW stated they will check with the provider and reported to the patient that finding housing can be a long process and that the patient's medical needs have been treated and would have to be something he continues to pursue outpatient.    SW checked with the provider who confirmed the patient does not have any more tests that need to be completed and the patient can discharge once his medications have been filled.     Bedside nurse notified SW that the patient's medications have been filled and can leave at anytime. DAYNA arranged transportation back to [facility] through [employer] with a pickup time of 1515. DAYNA notified the bedside nurse. Patient went down to the lobby before SW could provide patient with " booking details.     Job Number: [id number]    GERONIMO Vera  [address]   Office: [phone]  Pager: [phone]  [email or web address]

## 2023-08-16 NOTE — PROGRESS NOTES
Prior Authorization Approval    Medication: JANUVIA 100 MG PO TABS  PA Initiated: 8/16/2023  PA Type: Clinical    Insurance: [employer] - Phone [phone] Fax [phone]  [facility] Key / Reference #: [id number]   Authorization Effective Dates: 8/16/2023 - 8/15/2024    Expected CoPay: 3.00     CoPay Card Eligible: No      Filling Pharmacy: [facility] - [place], [place] - [address] [place] [address]  Pharmacy Notified: Yes  Patient Notified: Yes      Lucy Martin  [facility] Liaison  Ph: [phone] Pager: [phone]   Securely message with the Vocera Web Console (learn more here)

## 2023-08-16 NOTE — PLAN OF CARE
Goal Outcome Evaluation:      Plan of Care Reviewed With: patient    Overall Patient Progress: no changeOverall Patient Progress: no change    Outcome Evaluation: Glucose in high-200s, per pt he has had no snacks to cover carbs. Slept most of shift. C/o shoulder/neck pain 10/10; IV tordol given. C/o rash on face; writer is unable to see this. C/o gums bleeding while brusing teeth in am. Gums appear pink, moist, intact but pt will save the bloody spit if it happens again. PIV SL.    Update: pt now requesting snacks again.

## 2023-08-16 NOTE — PLAN OF CARE
Goal Outcome Evaluation:    D: Patient ready for discharge from the hospital.   I: Discharge and medication instructions reviewed with patient.    A: Verbalized understanding to discharge plan, but uninterested in detailed discussions.     P: Discharged from the hospital at approximately 1510 via arranged taxi cab.

## 2023-08-16 NOTE — DISCHARGE SUMMARY
"[facility]  Hospitalist Discharge Summary      Date of Admission:  8/6/2023  Date of Discharge:  8/16/2023  Discharging Provider: Roro Gomes CNP, Evelio Levy MD   Discharge Service: Hospitalist Service, GOLD TEAM 6    Discharge Diagnoses   # Recurrent SBO   # Hypokalemia  # Hypophosphatemia   # Type 2 DM, poorly controlled    # Hyperglycemia   # Ketonuria, glucosuria   # HIV   # Housing instability   # Hx methamphetamine use   # Hypomagnesemia     Clinically Significant Risk Factors     # DMII: A1C = 13.3 % (Ref range: <5.7 %) within past 6 months  # Overweight: Estimated body mass index is 28.41 kg/m  as calculated from the following:    Height as of this encounter: 1.626 m (5' 4\").    Weight as of this encounter: 75.1 kg (165 lb 8 oz).       Follow-ups Needed After Discharge   Follow-up Appointments     Adult [facility] Follow-up and recommended labs and tests      Follow up with primary care provider, Chevy Reynoso, within 7 days   for hospital follow- up.  The following labs/tests are recommended: CBC,   BMP.    Follow up with Infectious Disease for management of HIV      Appointments on [place] and/or [facility] (with [facility]   provider or service). Call [phone] if you haven't heard regarding   these appointments within 7 days of discharge.        Follow Up ([facility])      Please schedule appointment with KASSY within 7-10 days of discharge      Appointments on [place] and/or [facility] (with [facility] or [facility]   provider or service). Call [phone] if you haven't heard regarding   these appointments within 7 days of discharge.        Follow up with PCP for complaints of neuropathy.      Follow up with [facility] for diabetes management, recommend 1-2 weeks after discharge.      Unresulted Labs Ordered in the Past 30 Days of this Admission       No orders found from 7/7/2023 to 8/7/2023.            Discharge Disposition "   Discharged to [facility]  Condition at discharge: Stable    Hospital Course     #Recurrent SBO  #Hypokalemia, hypophosphatemia  Resolved.  Patient presented with acute onset of L sided abdominal pain, nausea, and NBNB vomiting for 1 day. CT showing duodenal wall thickening and mid small bowel obstruction with smooth tapering transition/no suspicious transition point. Evaluated by gen surg in the ED and agree with medical management. 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 Symptoms improving following NGT decompression. Tolerating diet, no further issues.      # T2DM  # Hyperglycemia  # Ketonuria/glucosuria   Glucose 440 on admit. Low concern for HHS or DKA. Hgb A1c 13.3% this admission.  On metformin and januvia PTA. Has diarrhea with metformin. Says he has not started the new diabetes medication he was started on last admit (presumably Glimepiride).  Endocrine consulted, appreciate recs.  Discharged with Lantus 38 units daily, Januvia 100mg daily, and Glimepiride 8mg daily (can take 4mg on days when intake/food access is lower).  Discharge w/ glucometer and supplies as well.     # HIV  Unclear who is managing his HIV. Had multiple undetectable viral loads in the past, however last one was 10/2022 and most recent absolute CD4 count is down from prior (330 5/2023 < 526 5/2022). Last ID note found was from 1/2023 and he did not have insurance so had to defer management until insurance obtained again. Follow up due in 4/2023, however no note found. Patient reports taking his Biktarvy, however CD4 is 535, viral load 19,900-> detectable viral load may be due to patient not fully adhering to his regimen. Needs to re-establish ID follow up -> received message from schedulers that [facility] unable to accept patient due to too many no shows, may need referral outside of our system.   Discharged with 30 day supply of his Biktarvy.      # Housing instability   Patient has been staying at [facility]. Discharged back there.      # Hx of IV meth use   Reports ongoing occasional IV meth use though does not go into detail. Discussed cessation.     # Hypomagnesemia - Mg 1.6 on day of discharge.  Gave 800mg PO x 1 dose.     Consultations This Hospital Stay   NURSING TO CONSULT FOR VASCULAR ACCESS CARE IP CONSULT  NURSING TO CONSULT FOR VASCULAR ACCESS CARE IP CONSULT  CARE MANAGEMENT / SOCIAL WORK IP CONSULT  ENDOCRINE DIABETES ADULT IP CONSULT  DIABETES EDUCATION IP CONSULT  PHARMACY LIAISON FOR MEDICATION COVERAGE CONSULT  PHARMACY IP CONSULT  PHARMACY LIAISON FOR MEDICATION COVERAGE CONSULT  PHARMACY LIAISON FOR MEDICATION COVERAGE CONSULT    Code Status   Full Code    Time Spent on this Encounter   I, Roro Gomes CNP, personally saw the patient today and spent greater than 30 minutes discharging this patient.       Roro Gomes CNP  [facility] 7C MED SURG  [address] [address]  Phone: [phone]  ______________________________________________________________________    Physical Exam   Vital Signs: Temp: 98  F (36.7  C) Temp src: Oral BP: (!) 142/87 Pulse: 95   Resp: 16 SpO2: 98 % O2 Device: None (Room air)    Weight: 165 lbs 8 oz    GENERAL: Alert and oriented x 3. Well nourished, well developed.  No acute distress.    HEENT: Normocephalic, atraumatic. Anicteric sclera. Mucous membranes moist.   CV: RRR. S1, S2. No murmurs appreciated.   RESPIRATORY: Effort normal on room air. Lungs CTAB with no wheezing, rales, or rhonchi.   GI: Abdomen soft and non distended, bowel sounds present x all 4 quadrants. No tenderness, rebound, or guarding.   NEUROLOGICAL: No focal deficits. Follows commands.  Strength equal in upper and lower extremities.   MUSCULOSKELETAL: No joint swelling or tenderness. Moves all extremities.   EXTREMITIES: No gross deformities. No  peripheral edema.   SKIN: Grossly warm, dry, and intact. No jaundice. No rashes.          Primary Care Physician   Chevy Reynoso    Discharge Orders      Infectious Disease Referral      Reason for your hospital stay    Bowel obstruction     Activity    Your activity upon discharge: activity as tolerated     Adult [facility] Follow-up and recommended labs and tests    Follow up with primary care provider, Chevy Reynoso, within 7 days for hospital follow- up.  The following labs/tests are recommended: CBC, BMP.    Follow up with Infectious Disease for management of HIV      Appointments on [facility]/or [facility] (with [facility] or [facility] provider or service). Call [phone] if you haven't heard regarding these appointments within 7 days of discharge.     Follow Up ([facility])    Please schedule appointment with KASSY within 7-10 days of discharge      Appointments on [facility]/or [facility] (with [facility] or [facility] provider or service). Call [phone] if you haven't heard regarding these appointments within 7 days of discharge.     Diet    Follow this diet upon discharge: Orders Placed This Encounter      Advance Diet as Tolerated: Clear Liquid Diet       Significant Results and Procedures   Most Recent 3 CBC's:  Recent Labs   Lab Test 08/14/23  1000 08/12/23  1021 08/11/23  0736   WBC 7.1 6.7 6.3   HGB 12.2* 12.3* 12.7*   MCV 84 85 85    283 304     Most Recent 3 BMP's:  Recent Labs   Lab Test 08/16/23  0743 08/16/23  0205 08/15/23  2145 08/15/23  0849 08/15/23  0753 08/14/23  1054 08/14/23  1000 08/13/23  1224 08/13/23  0825 08/12/23  1248 08/12/23  1021   NA  --   --   --   --   --   --  138  --  131*  --  134*   POTASSIUM  --   --   --   --  4.0  --  3.5  --  4.0  4.0  --  3.5   CHLORIDE  --   --   --   --   --   --  105  --  98  --  100   CO2  --   --   --   --   --   --  20*  --  19*  --  22   BUN  --   --   --   --   --   --  21.0  --  14.2  --  14.1   CR  --   --   --   --   --   --   0.69  --  0.65*  --  0.65*   ANIONGAP  --   --   --   --   --   --  13  --  14  --  12   LINDA  --   --   --   --   --   --  8.9  --  9.0  --  9.0   * 305* 205*   < >  --    < > 218*   < > 562*   < > 353*    < > = values in this interval not displayed.     Most Recent 2 LFT's:  Recent Labs   Lab Test 08/06/23 2243 06/28/23  1556   AST 27 11   ALT 14 12   ALKPHOS 192* 145*   BILITOTAL 0.5 0.3     Most Recent 3 INR's:  Recent Labs   Lab Test 08/06/23 2243 03/28/23 2159 02/06/23 2017   INR 0.91 1.00 0.98   ,   Results for orders placed or performed during the hospital encounter of 08/06/23   CT Abdomen Pelvis w Contrast    Narrative    EXAM: CT ABDOMEN PELVIS W CONTRAST  LOCATION: [facility]  DATE: 8/7/2023    INDICATION: ? bowel obstruction or other cause for abd pain, N V, decreased BM Flatus (similar to prior SBOs)  COMPARISON: 06/21/2023  TECHNIQUE: CT scan of the abdomen and pelvis was performed following injection of IV contrast. Multiplanar reformats were obtained. Dose reduction techniques were used.  CONTRAST: iopamidol (ISOVUE 370) solution 92 mL    FINDINGS:   LOWER CHEST: Normal.    HEPATOBILIARY: Gallstone.    PANCREAS: Normal.    SPLEEN: Normal.    ADRENAL GLANDS: Normal.    KIDNEYS/BLADDER: Normal.    BOWEL: Differential thickening distal esophagus otherwise decompressed. Gastric distention. Circumferential wall thickening mid duodenum. Mid small bowel fluid filled dilatation up to 5 cm in caliber. Smooth tapering transition distally. Small stool gas   burden in colon.    LYMPH NODES: Normal.    VASCULATURE: Unremarkable.    PELVIC ORGANS: Prostatomegaly. Penile prosthesis in place.    MUSCULOSKELETAL: Normal.      Impression    IMPRESSION:   1.  Gastric distention with wall thickening of mid duodenum.  2.  Mid small bowel obstruction. No suspicious transition.   XR Abdomen Port 1 View    Narrative    EXAM: XR ABDOMEN PORT 1 VIEW  LOCATION: [facility]  DATE: 8/7/2023    INDICATION: S p NGT placement.  COMPARISON: Abdominal x-ray on 4/19/2023, CT abdomen and pelvis on 8/6/2023.      Impression    IMPRESSION: Single AP view of the abdomen was obtained. Enteric tube tip and sideholes projects over the stomach.   XR Gastrografin Challenge    Narrative    Exam: XR GASTROGRAFIN CHALLENGE, 8/7/2023 10:20 PM    History: mid SBO without suspicious transition    Comparison: Abdominal x-ray 8/7/2023, abdominal CT 8/6/2023    Findings:   Single AP view of the abdomen taken 8 hours after ingestion of initial  Gastrografin bolus. Enteric tube tip projecting over the distal  stomach.    Large bowel and rectum contain enteric contrast.      Impression    Impression:   Contrast fills the large bowel and rectum. No evidence for small bowel  obstruction.       I have personally reviewed the examination and initial interpretation  and I agree with the findings.    MARY RAMEY MD         SYSTEM ID:  [id number]   XR Shoulder Right G/E 3 Views    Narrative    EXAM: XR SHOULDER RIGHT G/E 3 VIEWS  LOCATION: [facility]  DATE: 8/13/2023    INDICATION: right shoulder pain  COMPARISON: None.      Impression    IMPRESSION: Hypertrophic change at the right AC joint. No evidence for fracture. The right glenoid humeral joint is negative.     *Note: Due to a large number of results and/or encounters for the requested time period, some results have not been displayed. A complete set of results can be found in Results Review.       Discharge Medications   Current Discharge Medication List        START taking these medications    Details   Alcohol Swabs PADS Use to swab the area of the injection or todd as directed Per insurance coverage  Qty: 100 each, Refills: 0    Associated Diagnoses: Hyperglycemia      !! blood glucose (NO BRAND SPECIFIED) lancets standard To use to test glucose level in the blood  Use to test blood sugar  2  times daily as directed. To accompany glucose monitor brands per insurance coverage.  Qty: 100 each, Refills: 0    Associated Diagnoses: Hyperglycemia      !! blood glucose (NO BRAND SPECIFIED) test strip To use to test glucose level in the blood Use to test blood sugar  2 times daily as directed. To accompany glucose monitor brands per insurance coverage.  Qty: 100 strip, Refills: 0    Associated Diagnoses: Hyperglycemia      insulin pen needle (31G X 5 MM) 31G X 5 MM miscellaneous Use as directed by provider Per insurance coverage  Qty: 100 each, Refills: 0    Associated Diagnoses: Hyperglycemia      Sharps Container MISC Use as directed to dispose of needles, lancets and other sharps  Qty: 1 each, Refills: 0    Associated Diagnoses: Hyperglycemia       !! - Potential duplicate medications found. Please discuss with provider.        CONTINUE these medications which have NOT CHANGED    Details   bictegravir-emtricitabine-tenofovir (BIKTARVY) -25 MG per tablet Take 1 tablet by mouth daily  Qty: 30 tablet, Refills: 2    Associated Diagnoses: Human immunodeficiency virus (HIV) disease (H)      !! blood glucose (NO BRAND SPECIFIED) lancets standard Use to test blood sugar 1 time daily or as directed.  Qty: 100 lancet, Refills: 4    Associated Diagnoses: Type 2 diabetes mellitus with hyperglycemia, with long-term current use of insulin (H)      !! blood glucose (NO BRAND SPECIFIED) test strip Use to test blood sugar 1 time daily or as directed.  Qty: 100 strip, Refills: 4    Associated Diagnoses: Type 2 diabetes mellitus with hyperglycemia, with long-term current use of insulin (H)      blood glucose monitoring (NO BRAND SPECIFIED) meter device kit Use to test blood sugar 1 time daily or as directed.  Qty: 1 kit, Refills: 0    Associated Diagnoses: Type 2 diabetes mellitus with hyperglycemia, with long-term current use of insulin (H)      buPROPion (WELLBUTRIN XL) 150 MG 24 hr tablet  "Take 1 tablet (150 mg) by mouth daily  Qty: 60 tablet, Refills: 0    Associated Diagnoses: Methamphetamine abuse (H)      famotidine (PEPCID) 40 MG tablet Take 1 tablet (40 mg) by mouth daily  Qty: 60 tablet, Refills: 1    Associated Diagnoses: Gastroesophageal reflux disease, unspecified whether esophagitis present      glimepiride (AMARYL) 4 MG tablet Take 1 tablet (4 mg) by mouth 2 times daily (before meals)  Qty: 90 tablet, Refills: 1    Associated Diagnoses: Type 2 diabetes mellitus without complication, without long-term current use of insulin (H)      ibuprofen (ADVIL/MOTRIN) 600 MG tablet Take 1 tablet (600 mg) by mouth every 6 hours as needed for moderate pain (4-6)  Qty: 90 tablet, Refills: 0    Associated Diagnoses: Cellulitis of left wrist      levofloxacin (LEVAQUIN) 500 MG tablet Take 1 tablet (500 mg) by mouth daily  Qty: 3 tablet, Refills: 0    Associated Diagnoses: Cellulitis of left wrist      metFORMIN (FORTAMET) 1000 MG 24 hr tablet Take 2 tablets (2,000 mg) by mouth daily (with dinner)  Qty: 120 tablet, Refills: 0    Associated Diagnoses: Type 2 diabetes mellitus without complication, without long-term current use of insulin (H)      sitagliptin (JANUVIA) 100 MG tablet Take 1 tablet (100 mg) by mouth daily  Qty: 60 tablet, Refills: 0    Associated Diagnoses: Type 2 diabetes mellitus without complication, without long-term current use of insulin (H)       !! - Potential duplicate medications found. Please discuss with provider.        Allergies   Allergies   Allergen Reactions    Fentanyl Blisters     Per pt, after taking this medication had blisters develope    Tylenol [Acetaminophen] Itching    Dulaglutide Rash    Insulin Lispro Rash     Patient reported    Penicillin V Other (See Comments) and Rash     Diffuse maculopapular rash + feels \"high\", per pt.      "

## 2023-09-10 ENCOUNTER — HOSPITAL ENCOUNTER (EMERGENCY)
Facility: CLINIC | Age: 58
Discharge: HOME | End: 2023-09-10
Attending: STUDENT IN AN ORGANIZED HEALTH CARE EDUCATION/TRAINING PROGRAM | Admitting: STUDENT IN AN ORGANIZED HEALTH CARE EDUCATION/TRAINING PROGRAM
Payer: COMMERCIAL

## 2023-09-10 VITALS
WEIGHT: 160 LBS | TEMPERATURE: 98.6 F | DIASTOLIC BLOOD PRESSURE: 66 MMHG | BODY MASS INDEX: 27.31 KG/M2 | RESPIRATION RATE: 16 BRPM | SYSTOLIC BLOOD PRESSURE: 114 MMHG | HEART RATE: 85 BPM | OXYGEN SATURATION: 100 % | HEIGHT: 64 IN

## 2023-09-10 DIAGNOSIS — T83.410A: ICD-10-CM

## 2023-09-10 LAB
ALBUMIN SERPL BCG-MCNC: 3.9 G/DL (ref 3.5–5.2)
ALBUMIN UR-MCNC: NEGATIVE MG/DL
ALP SERPL-CCNC: 122 U/L (ref 40–129)
ALT SERPL W P-5'-P-CCNC: 15 U/L (ref 0–70)
ANION GAP SERPL CALCULATED.3IONS-SCNC: 13 MMOL/L (ref 7–15)
APPEARANCE UR: CLEAR
AST SERPL W P-5'-P-CCNC: 23 U/L (ref 0–45)
BASOPHILS # BLD AUTO: 0 10E3/UL (ref 0–0.2)
BASOPHILS NFR BLD AUTO: 0 %
BILIRUB SERPL-MCNC: 0.5 MG/DL
BILIRUB UR QL STRIP: NEGATIVE
BUN SERPL-MCNC: 20.2 MG/DL (ref 8–23)
CALCIUM SERPL-MCNC: 9.9 MG/DL (ref 8.6–10)
CHLORIDE SERPL-SCNC: 99 MMOL/L (ref 98–107)
COLOR UR AUTO: ABNORMAL
CREAT SERPL-MCNC: 0.68 MG/DL (ref 0.67–1.17)
DEPRECATED HCO3 PLAS-SCNC: 23 MMOL/L (ref 22–29)
EGFRCR SERPLBLD CKD-EPI 2021: >90 ML/MIN/1.73M2
EOSINOPHIL # BLD AUTO: 0.1 10E3/UL (ref 0–0.7)
EOSINOPHIL NFR BLD AUTO: 1 %
ERYTHROCYTE [DISTWIDTH] IN BLOOD BY AUTOMATED COUNT: 13.5 % (ref 10–15)
GLUCOSE SERPL-MCNC: 397 MG/DL (ref 70–99)
GLUCOSE UR STRIP-MCNC: >=1000 MG/DL
HCT VFR BLD AUTO: 38.3 % (ref 40–53)
HGB BLD-MCNC: 12.9 G/DL (ref 13.3–17.7)
HGB UR QL STRIP: NEGATIVE
HOLD SPECIMEN: NORMAL
IMM GRANULOCYTES # BLD: 0 10E3/UL
IMM GRANULOCYTES NFR BLD: 0 %
KETONES UR STRIP-MCNC: NEGATIVE MG/DL
LEUKOCYTE ESTERASE UR QL STRIP: NEGATIVE
LIPASE SERPL-CCNC: 38 U/L (ref 13–60)
LYMPHOCYTES # BLD AUTO: 1.9 10E3/UL (ref 0.8–5.3)
LYMPHOCYTES NFR BLD AUTO: 18 %
MCH RBC QN AUTO: 28.6 PG (ref 26.5–33)
MCHC RBC AUTO-ENTMCNC: 33.7 G/DL (ref 31.5–36.5)
MCV RBC AUTO: 85 FL (ref 78–100)
MONOCYTES # BLD AUTO: 0.7 10E3/UL (ref 0–1.3)
MONOCYTES NFR BLD AUTO: 6 %
NEUTROPHILS # BLD AUTO: 7.5 10E3/UL (ref 1.6–8.3)
NEUTROPHILS NFR BLD AUTO: 75 %
NITRATE UR QL: NEGATIVE
NRBC # BLD AUTO: 0 10E3/UL
NRBC BLD AUTO-RTO: 0 /100
PH UR STRIP: 6 [PH] (ref 5–7)
PLATELET # BLD AUTO: 299 10E3/UL (ref 150–450)
POTASSIUM SERPL-SCNC: 3.6 MMOL/L (ref 3.4–5.3)
PROT SERPL-MCNC: 6.9 G/DL (ref 6.4–8.3)
RBC # BLD AUTO: 4.51 10E6/UL (ref 4.4–5.9)
RBC URINE: 1 /HPF
SODIUM SERPL-SCNC: 135 MMOL/L (ref 136–145)
SP GR UR STRIP: 1.03 (ref 1–1.03)
UROBILINOGEN UR STRIP-MCNC: NORMAL MG/DL
WBC # BLD AUTO: 10.2 10E3/UL (ref 4–11)
WBC URINE: 0 /HPF

## 2023-09-10 PROCEDURE — 99283 EMERGENCY DEPT VISIT LOW MDM: CPT | Performed by: STUDENT IN AN ORGANIZED HEALTH CARE EDUCATION/TRAINING PROGRAM

## 2023-09-10 PROCEDURE — 81001 URINALYSIS AUTO W/SCOPE: CPT | Performed by: STUDENT IN AN ORGANIZED HEALTH CARE EDUCATION/TRAINING PROGRAM

## 2023-09-10 PROCEDURE — 87591 N.GONORRHOEAE DNA AMP PROB: CPT | Performed by: STUDENT IN AN ORGANIZED HEALTH CARE EDUCATION/TRAINING PROGRAM

## 2023-09-10 PROCEDURE — 83690 ASSAY OF LIPASE: CPT | Performed by: STUDENT IN AN ORGANIZED HEALTH CARE EDUCATION/TRAINING PROGRAM

## 2023-09-10 PROCEDURE — 87491 CHLMYD TRACH DNA AMP PROBE: CPT | Performed by: STUDENT IN AN ORGANIZED HEALTH CARE EDUCATION/TRAINING PROGRAM

## 2023-09-10 PROCEDURE — 250N000013 HC RX MED GY IP 250 OP 250 PS 637: Performed by: STUDENT IN AN ORGANIZED HEALTH CARE EDUCATION/TRAINING PROGRAM

## 2023-09-10 PROCEDURE — 36415 COLL VENOUS BLD VENIPUNCTURE: CPT | Performed by: STUDENT IN AN ORGANIZED HEALTH CARE EDUCATION/TRAINING PROGRAM

## 2023-09-10 PROCEDURE — 99284 EMERGENCY DEPT VISIT MOD MDM: CPT | Performed by: STUDENT IN AN ORGANIZED HEALTH CARE EDUCATION/TRAINING PROGRAM

## 2023-09-10 PROCEDURE — 85025 COMPLETE CBC W/AUTO DIFF WBC: CPT | Performed by: STUDENT IN AN ORGANIZED HEALTH CARE EDUCATION/TRAINING PROGRAM

## 2023-09-10 PROCEDURE — 80053 COMPREHEN METABOLIC PANEL: CPT | Performed by: STUDENT IN AN ORGANIZED HEALTH CARE EDUCATION/TRAINING PROGRAM

## 2023-09-10 RX ORDER — IBUPROFEN 600 MG/1
600 TABLET, FILM COATED ORAL ONCE
Status: COMPLETED | OUTPATIENT
Start: 2023-09-10 | End: 2023-09-10

## 2023-09-10 RX ADMIN — IBUPROFEN 600 MG: 600 TABLET, FILM COATED ORAL at 09:25

## 2023-09-10 ASSESSMENT — ACTIVITIES OF DAILY LIVING (ADL)
ADLS_ACUITY_SCORE: 37

## 2023-09-10 NOTE — ED PROVIDER NOTES
"ED Provider Note  [facility]      History     Chief Complaint   Patient presents with    Testicular/scrotal Pain        HPI    Andrew Collier is a 57 year old year old with history of toxoplasma encephalitis, HIV infection, bowel obstruction, DM 2, ED, s/p penile implant, methamphetamine abuse, AIDS    Patient states that he has a penile implant.  Experiencing issues with his pump as well as a burning sensation in the area.  Symptoms started yesterday evening.  Patient states his blood sugar is up, uses long-acting insulin, last use yesterday at night.  No burning with urination.  He also has kidney pain.  Denies any fevers or chills.    A medically appropriate review of systems was performed with pertinent positives and negatives noted in the HPI, and all other systems negative.    Physical Exam   /66   Pulse 85   Temp 98.6  F (37  C) (Oral)   Resp 16   Ht 1.626 m (5' 4\")   Wt 72.6 kg (160 lb)   SpO2 100%   BMI 27.46 kg/m     Physical Exam  General: no acute distress, falling asleep during exam  HENT: MMM, no oropharyngeal lesions  Eyes: PERRL, normal sclerae  Neck: non-tender, supple  Cardio: Regular rate and rhythm. Extremities well perfused  Resp: Normal work of breathing, no accessory muscle use  Abdomen: non tender, non-distended, no rebound, no guarding  : No redness or swelling to the scrotum or penis, penile implant in place, pump in right testicle  Neuro: alert and oriented. Grossly normal strength and sensation in all extremities.   MSK: no deformities. Grossly normal ROM in extremities.   Integumentary/Skin: no rashes or lesions      Procedures, & Data      Procedures    Critical care was not performed.     Medical Decision Making  The patient's presentation was of moderate complexity (an undiagnosed new problem with uncertain diagnosis).    The patient's evaluation involved:  strong consideration of a test (see separate area of note for details) that " was ultimately deferred  ordering and/or review of 3+ test(s) in this encounter (see separate area of note for details)    The patient's management necessitated moderate risk (limitations due to social determinants of health (chemical dependency, history of meth use)).    Medical Decision Making and ED Course    Patient is a 57-year-old male with history of methamphetamine use, type 2 diabetes on insulin, penile implant who presents because his penile implant is not working, has penile burning, and thinks his sugars are too high.  He reports using insulin yesterday but has not used it yet today.  We will check labs to evaluate for DKA and hyperglycemia.  In regards to his penile implant, there are no signs of infection or swelling on exam.  When trying to inflate his pump it is not working.  There is no emergent pathology identified on exam so did not obtain imaging studies although this was considered.  He will need him to follow-up with his urologist for the pump dysfunction.  Will check for UTI and for STI.    ED Course as of 09/10/23 1441   Sun Sep 10, 2023   1032 Glucose(!): 397   1032 Anion Gap: 13  Hyperglycemia without signs of DKA   1032 Carbon Dioxide (CO2): 23   1032 WBC: 10.2  Reassuring against infection or inflammation   1032 Lipase: 38  Low concern for pancreatitis   1032 AST: 23   1032 ALT: 15   1032 Bilirubin Total: 0.5  Within normal limits, low concern for hepatobiliary pathology   1336 Nitrite Urine: Negative   1336 WBC Urine: 0  No signs of infection   1441 Chlamydia trachomatis/Neisseria gonorrhoeae by PCR  Pending at the time of discharge.  Will have patient follow these results up with his primary care physician     Recommended he follow-up with his urologist regarding his penile pump dysfunction.    I have reviewed the nursing notes. I have reviewed the findings, diagnosis, and plan with the patient.    Impression   Final diagnoses:   Failure of penile implant, initial encounter (H)       I,  Lucy Howell, am serving as a trained medical scribe to document services personally performed by Mario Chinchilla MD, based on the provider's statements to me.     I, Mario Chinchilla MD, was physically present and have reviewed and verified the accuracy of this note documented by Lucy Howell.    Mario Chinchilla MD  [facility] EMERGENCY DEPARTMENT  September 10, 2023       Mario Chinchilla MD  09/10/23 1666

## 2023-09-10 NOTE — ED TRIAGE NOTES
"Triage Assessment & Note:    Pulse 108   Temp 98.4  F (36.9  C) (Oral)   Resp 16   Ht 1.626 m (5' 4\")   Wt 72.6 kg (160 lb)   SpO2 98%   BMI 27.46 kg/m      Patient presents with: C/o testicular pain and swelling x 24hrs. PT denies any injury.     Home Treatments/Remedies: None    Febrile / Afebrile? Afebrile     Duration of C/o:  1 day    Mick Barrientos RN  September 10, 2023       Triage Assessment       Row Name 09/10/23 0829       Triage Assessment (Adult)    Airway WDL WDL       Respiratory WDL    Respiratory WDL WDL       Cardiac WDL    Cardiac WDL WDL                    "

## 2023-09-10 NOTE — DISCHARGE INSTRUCTIONS
You were seen today for failure of your penile implant. As we discussed  -Call your urologist tomorrow to set up an appointment about your penile implant  -You do have a high blood sugar, take insulin as prescribed  -Your urine did not show any signs of infection  - Please return to the emergency department if you have severe worsening in your symptoms

## 2023-09-11 LAB
C TRACH DNA SPEC QL PROBE+SIG AMP: NEGATIVE
N GONORRHOEA DNA SPEC QL NAA+PROBE: NEGATIVE

## 2023-09-12 ENCOUNTER — HOSPITAL ENCOUNTER (EMERGENCY)
Facility: CLINIC | Age: 58
Discharge: HOME | End: 2023-09-12
Attending: EMERGENCY MEDICINE | Admitting: EMERGENCY MEDICINE
Payer: COMMERCIAL

## 2023-09-12 ENCOUNTER — APPOINTMENT (OUTPATIENT)
Dept: ULTRASOUND IMAGING | Facility: CLINIC | Age: 58
End: 2023-09-12
Attending: EMERGENCY MEDICINE
Payer: COMMERCIAL

## 2023-09-12 ENCOUNTER — APPOINTMENT (OUTPATIENT)
Dept: CT IMAGING | Facility: CLINIC | Age: 58
End: 2023-09-12
Attending: EMERGENCY MEDICINE
Payer: COMMERCIAL

## 2023-09-12 VITALS
RESPIRATION RATE: 17 BRPM | DIASTOLIC BLOOD PRESSURE: 89 MMHG | HEART RATE: 94 BPM | SYSTOLIC BLOOD PRESSURE: 138 MMHG | TEMPERATURE: 98.4 F | OXYGEN SATURATION: 100 %

## 2023-09-12 DIAGNOSIS — N50.82 SCROTAL PAIN: ICD-10-CM

## 2023-09-12 DIAGNOSIS — R59.9 ENLARGED LYMPH NODES: ICD-10-CM

## 2023-09-12 LAB
ALBUMIN SERPL BCG-MCNC: 3.7 G/DL (ref 3.5–5.2)
ALBUMIN UR-MCNC: NEGATIVE MG/DL
ALP SERPL-CCNC: 117 U/L (ref 40–129)
ALT SERPL W P-5'-P-CCNC: 12 U/L (ref 0–70)
ANION GAP SERPL CALCULATED.3IONS-SCNC: 9 MMOL/L (ref 7–15)
APPEARANCE UR: CLEAR
AST SERPL W P-5'-P-CCNC: 13 U/L (ref 0–45)
BASOPHILS # BLD AUTO: 0 10E3/UL (ref 0–0.2)
BASOPHILS NFR BLD AUTO: 1 %
BILIRUB SERPL-MCNC: 0.4 MG/DL
BILIRUB UR QL STRIP: NEGATIVE
BUN SERPL-MCNC: 15 MG/DL (ref 8–23)
CALCIUM SERPL-MCNC: 9.3 MG/DL (ref 8.6–10)
CHLORIDE SERPL-SCNC: 100 MMOL/L (ref 98–107)
COLOR UR AUTO: ABNORMAL
CREAT SERPL-MCNC: 0.71 MG/DL (ref 0.67–1.17)
DEPRECATED HCO3 PLAS-SCNC: 24 MMOL/L (ref 22–29)
EGFRCR SERPLBLD CKD-EPI 2021: >90 ML/MIN/1.73M2
EOSINOPHIL # BLD AUTO: 0.2 10E3/UL (ref 0–0.7)
EOSINOPHIL NFR BLD AUTO: 2 %
ERYTHROCYTE [DISTWIDTH] IN BLOOD BY AUTOMATED COUNT: 13.7 % (ref 10–15)
GLUCOSE SERPL-MCNC: 428 MG/DL (ref 70–99)
GLUCOSE UR STRIP-MCNC: >=1000 MG/DL
HCT VFR BLD AUTO: 39.6 % (ref 40–53)
HGB BLD-MCNC: 13.4 G/DL (ref 13.3–17.7)
HGB UR QL STRIP: ABNORMAL
IMM GRANULOCYTES # BLD: 0 10E3/UL
IMM GRANULOCYTES NFR BLD: 0 %
KETONES UR STRIP-MCNC: NEGATIVE MG/DL
LEUKOCYTE ESTERASE UR QL STRIP: NEGATIVE
LYMPHOCYTES # BLD AUTO: 1.9 10E3/UL (ref 0.8–5.3)
LYMPHOCYTES NFR BLD AUTO: 28 %
MCH RBC QN AUTO: 28.9 PG (ref 26.5–33)
MCHC RBC AUTO-ENTMCNC: 33.8 G/DL (ref 31.5–36.5)
MCV RBC AUTO: 85 FL (ref 78–100)
MONOCYTES # BLD AUTO: 0.6 10E3/UL (ref 0–1.3)
MONOCYTES NFR BLD AUTO: 9 %
NEUTROPHILS # BLD AUTO: 4.1 10E3/UL (ref 1.6–8.3)
NEUTROPHILS NFR BLD AUTO: 60 %
NITRATE UR QL: NEGATIVE
NRBC # BLD AUTO: 0 10E3/UL
NRBC BLD AUTO-RTO: 0 /100
PH UR STRIP: 6 [PH] (ref 5–7)
PLATELET # BLD AUTO: 315 10E3/UL (ref 150–450)
POTASSIUM SERPL-SCNC: 4 MMOL/L (ref 3.4–5.3)
PROT SERPL-MCNC: 6.8 G/DL (ref 6.4–8.3)
RADIOLOGIST FLAGS: ABNORMAL
RBC # BLD AUTO: 4.64 10E6/UL (ref 4.4–5.9)
RBC URINE: <1 /HPF
SODIUM SERPL-SCNC: 133 MMOL/L (ref 136–145)
SP GR UR STRIP: 1.03 (ref 1–1.03)
UROBILINOGEN UR STRIP-MCNC: NORMAL MG/DL
WBC # BLD AUTO: 6.8 10E3/UL (ref 4–11)
WBC URINE: 0 /HPF

## 2023-09-12 PROCEDURE — 250N000011 HC RX IP 250 OP 636: Mod: JZ | Performed by: EMERGENCY MEDICINE

## 2023-09-12 PROCEDURE — 80053 COMPREHEN METABOLIC PANEL: CPT | Performed by: EMERGENCY MEDICINE

## 2023-09-12 PROCEDURE — 74177 CT ABD & PELVIS W/CONTRAST: CPT

## 2023-09-12 PROCEDURE — 99207 US TESTICULAR AND SCROTUM WITH DOPPLER LIMITED: CPT | Mod: 26 | Performed by: RADIOLOGY

## 2023-09-12 PROCEDURE — 99284 EMERGENCY DEPT VISIT MOD MDM: CPT | Performed by: EMERGENCY MEDICINE

## 2023-09-12 PROCEDURE — 96374 THER/PROPH/DIAG INJ IV PUSH: CPT | Mod: 59

## 2023-09-12 PROCEDURE — 93976 VASCULAR STUDY: CPT

## 2023-09-12 PROCEDURE — 99243 OFF/OP CNSLTJ NEW/EST LOW 30: CPT | Performed by: PHYSICIAN ASSISTANT

## 2023-09-12 PROCEDURE — 85025 COMPLETE CBC W/AUTO DIFF WBC: CPT | Performed by: EMERGENCY MEDICINE

## 2023-09-12 PROCEDURE — 36415 COLL VENOUS BLD VENIPUNCTURE: CPT | Performed by: EMERGENCY MEDICINE

## 2023-09-12 PROCEDURE — 99285 EMERGENCY DEPT VISIT HI MDM: CPT | Mod: 25

## 2023-09-12 PROCEDURE — 74177 CT ABD & PELVIS W/CONTRAST: CPT | Mod: 26 | Performed by: RADIOLOGY

## 2023-09-12 PROCEDURE — 81001 URINALYSIS AUTO W/SCOPE: CPT | Performed by: EMERGENCY MEDICINE

## 2023-09-12 PROCEDURE — 250N000011 HC RX IP 250 OP 636: Performed by: EMERGENCY MEDICINE

## 2023-09-12 PROCEDURE — 76870 US EXAM SCROTUM: CPT | Mod: 26 | Performed by: RADIOLOGY

## 2023-09-12 RX ORDER — IOPAMIDOL 755 MG/ML
100 INJECTION, SOLUTION INTRAVASCULAR ONCE
Status: COMPLETED | OUTPATIENT
Start: 2023-09-12 | End: 2023-09-12

## 2023-09-12 RX ORDER — FAMOTIDINE 10 MG
10 TABLET ORAL ONCE
Status: DISCONTINUED | OUTPATIENT
Start: 2023-09-12 | End: 2023-09-12 | Stop reason: HOSPADM

## 2023-09-12 RX ORDER — KETOROLAC TROMETHAMINE 15 MG/ML
15 INJECTION, SOLUTION INTRAMUSCULAR; INTRAVENOUS ONCE
Status: COMPLETED | OUTPATIENT
Start: 2023-09-12 | End: 2023-09-12

## 2023-09-12 RX ORDER — MAGNESIUM HYDROXIDE/ALUMINUM HYDROXICE/SIMETHICONE 120; 1200; 1200 MG/30ML; MG/30ML; MG/30ML
30 SUSPENSION ORAL ONCE
Status: DISCONTINUED | OUTPATIENT
Start: 2023-09-12 | End: 2023-09-12 | Stop reason: HOSPADM

## 2023-09-12 RX ADMIN — KETOROLAC TROMETHAMINE 15 MG: 15 INJECTION, SOLUTION INTRAMUSCULAR; INTRAVENOUS at 11:34

## 2023-09-12 RX ADMIN — IOPAMIDOL 100 ML: 755 INJECTION, SOLUTION INTRAVENOUS at 15:53

## 2023-09-12 ASSESSMENT — ACTIVITIES OF DAILY LIVING (ADL)
ADLS_ACUITY_SCORE: 37
ADLS_ACUITY_SCORE: 37

## 2023-09-12 NOTE — DISCHARGE INSTRUCTIONS
Follow up with your urologist for your penile prosthesis.   Your ultrasound and CT scan did not show signs of infection.

## 2023-09-12 NOTE — ED NOTES
"Pt walked to the door and said \"I'm leaving\"/  Redirected to sit down for IV removal and marietta ginger ale. Given paperwork for discharge  "

## 2023-09-12 NOTE — CONSULTS
"Urology Consult History and Physical    Name: Andrew Collier    MRN: [id number]   YOB: 1965       We were asked to see Andrew Collier at the request of Dr. Ritter for evaluation and treatment of the following chief complaint:          Chief Complaint:   Right testicular pain  History is obtained from patient and review of records          History of Present Illness:   Andrew Collier is a 57 year old male with pmh significant for HLD, DM II, GERD, HIV (c/b HSV positive anal biopsies and CNS toxoplasmosis), Hx perforated appendicities s/p appendecomy, Hx multiple SBOs, PTSD and urologic hx of penile prosthesis 6/2014  (Dr. Samuel Torres) who presented to the ED stating that his penile prosthesis burst while he was sleeping 3 days ago.  No trauma to the site.  Sts prior to bursting the IPP worked normally.  Now when he squeezes the pump in the R hemiscrotum the device won't inflate.  Also about 3 days ago he noted burning down in his BL nuts - rates pain as 8/10, neither worsening nor improving since then.  Until today he has tried no pain medication, but here in the ED sts that toradol is helping.  Has never had this pain before.  Is urinating normally with clear urine.  No fevers, chills.      He is sexually active (male partners, donor only, always wears condoms).  Has never had an STI.    Sts right testicle has \"always been small\".  He isn't sure why.     Objectives:  Vitals are normal.   - Labs today show WBC 6.8, hgb 13.4.  Also normal BMP with exception of sodium (133) and glucose 428. UA also showed glucose >1000mg/dL  - From 9/10/23  GC/CT were negative.     He did undergo scrotal US today showing BL heterogeneity and ? Decreased right sided blood flow.            Past Medical History:     Past Medical History:   Diagnosis Date    Acute appendicitis with localized peritonitis 1/31/2018    AIDS (H)     Allergic rhinitis due to other allergen     DNS    Anal " dysplasia     Chronic abdominal pain     CNS toxoplasmosis (H)     COVID-19 1/11/2022    Diabetes type 2, controlled (H)     GERD (gastroesophageal reflux disease)     Herpes zoster 9/23/2016    History of seizure     History of substance abuse (H)     HIV (human immunodeficiency virus infection) (H)     HLD (hyperlipidemia)     Lung nodules     Periungual wart     Pneumonia 1/6/2019    PTSD (post-traumatic stress disorder)     Sleep apnea     doesn't use CPAP            Past Surgical History:     Past Surgical History:   Procedure Laterality Date    ANOSCOPY N/A 9/9/2020    Procedure: Exam Under Anesthesia, ANOSCOPY, fulgeration of rectal fissures with Rectal Biopsies;  Surgeon: Thanh Lundberg MD;  Location: [facility]    COLONOSCOPY Left 1/22/2016    Procedure: COMBINED COLONOSCOPY, SINGLE OR MULTIPLE BIOPSY/POLYPECTOMY BY BIOPSY;  Surgeon: Clark Saini MD;  Location: [facility]    ESOPHAGOSCOPY, GASTROSCOPY, DUODENOSCOPY (EGD), COMBINED N/A 6/6/2022    Procedure: ESOPHAGOGASTRODUODENOSCOPY, WITH BIOPSY;  Surgeon: Kecia Benítez MD;  Location: [facility]    HC EXPLORE UNDESC TESTIS,INGUIN/SCROTAL      LAPAROSCOPIC APPENDECTOMY N/A 1/31/2018    Procedure: LAPAROSCOPIC APPENDECTOMY;  LAPAROSCOPIC APPENDECTOMY;  Surgeon: Dawn Holt MD;  Location: [facility]    LAPAROSCOPY DIAGNOSTIC (GENERAL) N/A 7/26/2016    Procedure: LAPAROSCOPY DIAGNOSTIC (GENERAL);  Surgeon: Susannah Arriaga MD;  Location: [facility]    LAPAROSCOPY DIAGNOSTIC (GENERAL) N/A 4/16/2018    Procedure: LAPAROSCOPY DIAGNOSTIC (GENERAL);  Diagnostic laparoscopy and lysis of adhesions;  Surgeon: Prince Dowling MD;  Location: [facility]    OPTICAL TRACKING SYSTEM CRANIOTOMY, EXCISE TUMOR, COMBINED Left 4/10/2015    Procedure: COMBINED OPTICAL TRACKING SYSTEM CRANIOTOMY, EXCISE TUMOR;  Surgeon: Mirlande Colmenares MD;  Location: [facility]    REPAIR GAMEKEEPER'S THUMB Right 12/2/2016    Procedure: REPAIR LIGAMENT ULNAR COLLATERAL THUMB (GAMEKEEPER'S);  " Surgeon: Evin Zamorano MD;  Location:  OR    [facility] NONSPECIFIC PROCEDURE      right forearm fracture            Social History:     Social History     Tobacco Use    Smoking status: Some Days     Packs/day: 0.25     Years: 40.00     Pack years: 10.00     Types: Cigarettes    Smokeless tobacco: Former   Substance Use Topics    Alcohol use: No     Alcohol/week: 0.0 standard drinks of alcohol     Comment: Last etoh in 2007            Family History:     Family History   Problem Relation Age of Onset    Diabetes Brother     Diabetes Father     Alzheimer Disease Father     Unknown/Adopted Mother     Diabetes Paternal Grandfather     Cancer No family hx of         no skin cancer    Skin Cancer No family hx of         no famiy hx of skin cancer    Glaucoma No family hx of     Macular Degeneration No family hx of             Allergies:     Allergies   Allergen Reactions    Fentanyl Blisters     Per pt, after taking this medication had blisters develope    Tylenol [Acetaminophen] Itching    Dulaglutide Rash    Insulin Lispro Rash     Patient reported    Penicillin V Other (See Comments) and Rash     Diffuse maculopapular rash + feels \"high\", per pt.             Medications:     No current facility-administered medications for this encounter.     Current Outpatient Medications   Medication Sig    Alcohol Swabs PADS Use to swab the area of the injection or todd as directed Per insurance coverage    bictegravir-emtricitabine-tenofovir (BIKTARVY) -25 MG per tablet Take 1 tablet by mouth daily    blood glucose (NO BRAND SPECIFIED) lancets standard To use to test glucose level in the blood Use to test blood sugar  2  times daily as directed. To accompany glucose monitor brands per insurance coverage.    blood glucose (NO BRAND SPECIFIED) lancets standard Use to test blood sugar 1 time daily or as directed.    blood glucose (NO BRAND SPECIFIED) test strip To use to test glucose level in the blood Use to test blood " sugar  2 times daily as directed. To accompany glucose monitor brands per insurance coverage.    blood glucose (NO BRAND SPECIFIED) test strip Use to test blood sugar 1 time daily or as directed.    blood glucose monitoring (NO BRAND SPECIFIED) meter device kit Use to test blood sugar 4 times daily or as directed.    blood glucose monitoring (NO BRAND SPECIFIED) meter device kit Use to test blood sugar 1 time daily or as directed.    buPROPion (WELLBUTRIN XL) 150 MG 24 hr tablet Take 1 tablet (150 mg) by mouth daily    famotidine (PEPCID) 40 MG tablet Take 1 tablet (40 mg) by mouth daily    glimepiride (AMARYL) 4 MG tablet Take 2 tablets (8 mg) by mouth every morning (before breakfast)    glimepiride (AMARYL) 4 MG tablet Take 2 tablets (8 mg) by mouth every morning (before breakfast)    ibuprofen (ADVIL/MOTRIN) 600 MG tablet Take 1 tablet (600 mg) by mouth every 6 hours as needed for moderate pain (4-6)    insulin glargine (LANTUS PEN) 100 UNIT/ML pen Inject 38 Units Subcutaneous every morning    insulin pen needle (32G X 4 MM) 32G X 4 MM miscellaneous Use 4-5 pen needles daily or as directed.    Sharps Container MISC Use as directed to dispose of needles, lancets and other sharps    sitagliptin (JANUVIA) 100 MG tablet Take 1 tablet (100 mg) by mouth daily             Review of Systems:    ROS: See HPI for pertinent details.  Remainder of 10-point ROS negative.         Physical Exam:   VS:  T: 98.4    HR: 95    BP: 138/89    RR: 17   GEN:  AOx3.  NAD.  Pleasant.  HEENT:  Sclerae anicteric.  Conjunctivae pink.  Moist mucous membranes  LUNGS: Non-labored breathing.  ABD:  Soft.  NT.  ND.  No rebound or guarding.  No masses.    :  Phallus circumcised.  Glans normal.  Meatus patent without stenosis, blood or discharge. IPP present within penis - shaft is soft/malleable and nontender.   Left testicle is palpably normal and nontender without masses.  Difficult to palpate right testicle (which pt sts has always been  small)  IPP pump in dependent R hemiscrotum.  Site is nontender without matting.  The pump is able to be depressed but doesn't refill normally and no penile rigidity with use of pump.   The tubing tracks superiorly and is nontender along its course.    Scrotal skin and groin - no discoloration, crepitus, erythema, warmth or tenderness or lesions  Does have palpably tender BL inguinal lymph nodes which I suspect are the cause of the patient's pain today.           Data:   All laboratory data reviewed:    No results found for: PSA  Recent Labs   Lab 09/12/23  1133 09/10/23  0923   WBC 6.8 10.2   HGB 13.4 12.9*    299         Recent Labs   Lab 09/12/23  1133 09/10/23  0923   * 135*   POTASSIUM 4.0 3.6   CHLORIDE 100 99   CO2 24 23   BUN 15.0 20.2   CR 0.71 0.68   * 397*   LINDA 9.3 9.9         Recent Labs   Lab 09/12/23  1136   COLOR Light Yellow   APPEARANCE Clear   URINEGLC >=1000*   URINEBILI Negative   URINEKETONE Negative   SG 1.034   URINEPH 6.0   PROTEIN Negative   NITRITE Negative   LEUKEST Negative   RBCU <1   WBCU 0     Results for orders placed or performed during the hospital encounter of 09/24/20   Urine Culture Aerobic Bacterial    Specimen: Urine Midstream; Midstream Urine   Result Value Ref Range    Specimen Description Midstream Urine     Special Requests Specimen received in preservative     Culture Micro No growth      *Note: Due to a large number of results and/or encounters for the requested time period, some results have not been displayed. A complete set of results can be found in Results Review.       All pertinent imaging reviewed:  EXAMINATION: US TESTICULAR AND SCROTAL, 9/12/2023 1:48 PM      COMPARISON: CT abdomen and pelvis 8/7/2023.     HISTORY: Right-sided testicular pain, has penile implant.     TECHNIQUE: The scrotum was scanned in standard fashion with  specialized ultrasound transducer(s) using grey scale, color Doppler,  and spectral flow  techniques.      Findings:  Normal homogenous sonographic appearance of the left testicular  parenchyma. Heterogeneous appearance of the right testicular  parenchyma with intermixed hypoechoic regions as compared to the  parenchyma of the left testicle. No evidence of a focal lesion  bilaterally.  The right testicle measures 3.8 x 2.5 x 1.3 cm with a  volume of 6.4 cc and the left measures 4.1 x 2.6 x 1.7 cm with a  volume of 9.4 cc. The right testicle appears slightly high riding and  partially retracted inguinal canal.      Color doppler examination reveals intact vascular flow to the  bilateral testicles, although appears moderately reduced on the right  as compared to left.     There is no evidence of a significant hydrocele or varicocele.     Both the right and left epididymis are within normal limits. Small 4  mm left epididymal head cyst.     Penile prosthesis components partially visualized.                                                                   Impression:   Reduced vascularity with heterogeneous appearance of the right  testicular parenchyma, is nonspecific. Right testicle decreased in  size as compared to left. No additional findings to suggest acute  torsion and patient's symptoms reportedly appear to be associated with  the penile prosthesis rather than the right testicle as per  technologist. Findings may be related to a remote and/or chronic  process, potentially a prior infarct, previous torsion, trauma or  orchitis, rather than an acute process. Correlate with clinical  presentation and prior history.        [Urgent Result: Heterogeneous atrophic appearance of the right  testicle would be more in keeping with a subacute/chronic insult with  acute torsion unlikely as discussed above.]         Impression and Plan:   Impression / Plan:   Andrew Collier is a 57 year old male with HLD, DM II, GERD, HIV (c/b HSV positive anal biopsies and CNS toxoplasmosis), Hx perforated appendicities s/p  "appendecomy, Hx multiple SBOs, PTSD and urologic hx of penile prosthesis 6/2014  (AMS LGX 18cm cylinders with 1cm rear tip extenders, Dr. Samuel Torres) who presented to the ED stating that his penile prosthesis burst while he was sleeping 3 days ago.      Since that time he notes burning \"in the nuts\", but on exam his testes don't seem tender and he instead has tender bilateral inguinal LAD.        Per patient report he has had an atrophic right testis for a long, long time.  Suspect the radiologic changes are chronic rather than acute.     He therefore seems to have two problems (malfunctioning IPP and inguinal LAD).  Unsure if they are related.  The IPP device was filled with 100cc fluid back in 2014 - likely sterile water.  Potentially a leak developed in the system a few days ago? Perhaps the valves aren't working properly? It is difficult to know.  The devices almost always will fail if given enough time, but failure doesn't usually equal infection.  Even if the system had leaked it would be uncommon for this to cause infection.  Furthermore, although he has BL inguinal LAD, he has no leukocytosis, fevers/chills, tenderness at the pump site or penile cylinder site, or skin erythema.    - Difficult to know why he has the tender lymph nodes.  The IPP is comprised of 3 pieces, all connected - the pump in the scrotum, the cylinders in the penile shaft and the reservoir in the low abdomen.  Could scan the low abdomen to definitively rule out pathology.  Could try antibiotics.   - He will need follow up in Urology clinic to discuss replacement of IPP.  He would like a functional device.  I will send a message to clinic to get him an appointment with either Dr. Dumont or Dr. Cohen    Discussed with Dr. Chanel     Thank you for the opportunity to participate in the care of Andrew Collier.     Kailee Lopez PA-C  Urology Physician Assistant  Personal Pager: [phone]    Please call Job Code: "   [phone] to reach the Urology resident or PA on call - Weekdays  [phone] to reach the Urology resident or PA on call - Weeknights and weekends

## 2023-09-12 NOTE — ED TRIAGE NOTES
Ambulatory to triage for penile implant problem  Not working for 3 days  Severe pain  No issues with urinating     Triage Assessment       Row Name 09/12/23 1115       Triage Assessment (Adult)    Airway WDL WDL       Respiratory WDL    Respiratory WDL WDL       Skin Circulation/Temperature WDL    Skin Circulation/Temperature WDL WDL       Cardiac WDL    Cardiac WDL WDL       Peripheral/Neurovascular WDL    Peripheral Neurovascular WDL WDL       Cognitive/Neuro/Behavioral WDL    Cognitive/Neuro/Behavioral WDL WDL

## 2023-09-12 NOTE — ED PROVIDER NOTES
[place] EMERGENCY DEPARTMENT ([facility])    9/12/23       ED PROVIDER NOTE   Vertical Triage A  11:20 AM   History     Chief Complaint   Patient presents with    Penis/Scrotum Problem     HPI  Andrew Collier is a 57 year old male with history of HIV/AIDS, recurrent small bowel obstructions, methamphetamine abuse, and penile implant who presents with failure of penile implant.  He had presented to the emergency department yesterday reporting issues with his pump as well as right groin pain that had started 2 days ago.  He has not been able to get his implant to inflate.     Workup yesterday included labs and a UA which did not show acute findings. Patient reports he has still been having pain so returned to the ED.     Past Medical History  Past Medical History:   Diagnosis Date    Acute appendicitis with localized peritonitis 1/31/2018    AIDS (H)     Allergic rhinitis due to other allergen     DNS    Anal dysplasia     Chronic abdominal pain     CNS toxoplasmosis (H)     COVID-19 1/11/2022    Diabetes type 2, controlled (H)     GERD (gastroesophageal reflux disease)     Herpes zoster 9/23/2016    History of seizure     History of substance abuse (H)     HIV (human immunodeficiency virus infection) (H)     HLD (hyperlipidemia)     Lung nodules     Periungual wart     Pneumonia 1/6/2019    PTSD (post-traumatic stress disorder)     Sleep apnea     doesn't use CPAP     Past Surgical History:   Procedure Laterality Date    ANOSCOPY N/A 9/9/2020    Procedure: Exam Under Anesthesia, ANOSCOPY, fulgeration of rectal fissures with Rectal Biopsies;  Surgeon: Thanh Lundberg MD;  Location: [facility] OR    COLONOSCOPY Left 1/22/2016    Procedure: COMBINED COLONOSCOPY, SINGLE OR MULTIPLE BIOPSY/POLYPECTOMY BY BIOPSY;  Surgeon: Clark Saini MD;  Location: [facility] GI    ESOPHAGOSCOPY, GASTROSCOPY, DUODENOSCOPY (EGD), COMBINED N/A 6/6/2022    Procedure: ESOPHAGOGASTRODUODENOSCOPY, WITH BIOPSY;  Surgeon: Sultan  MD Kecia;  Location: [facility] GI    HC EXPLORE UNDESC TESTIS,INGUIN/SCROTAL      LAPAROSCOPIC APPENDECTOMY N/A 1/31/2018    Procedure: LAPAROSCOPIC APPENDECTOMY;  LAPAROSCOPIC APPENDECTOMY;  Surgeon: Dawn Holt MD;  Location: [facility] OR    LAPAROSCOPY DIAGNOSTIC (GENERAL) N/A 7/26/2016    Procedure: LAPAROSCOPY DIAGNOSTIC (GENERAL);  Surgeon: Susannah Arriaga MD;  Location: [facility]    LAPAROSCOPY DIAGNOSTIC (GENERAL) N/A 4/16/2018    Procedure: LAPAROSCOPY DIAGNOSTIC (GENERAL);  Diagnostic laparoscopy and lysis of adhesions;  Surgeon: Prince Dowling MD;  Location: [facility] OR    OPTICAL TRACKING SYSTEM CRANIOTOMY, EXCISE TUMOR, COMBINED Left 4/10/2015    Procedure: COMBINED OPTICAL TRACKING SYSTEM CRANIOTOMY, EXCISE TUMOR;  Surgeon: Mirlande Colmenares MD;  Location: [facility] OR    REPAIR GAMEKEEPER'S THUMB Right 12/2/2016    Procedure: REPAIR LIGAMENT ULNAR COLLATERAL THUMB (GAMEKEEPER'S);  Surgeon: Evin Zamorano MD;  Location: [facility] OR    ZZC NONSPECIFIC PROCEDURE      right forearm fracture     Alcohol Swabs PADS  bictegravir-emtricitabine-tenofovir (BIKTARVY) -25 MG per tablet  blood glucose (NO BRAND SPECIFIED) lancets standard  blood glucose (NO BRAND SPECIFIED) lancets standard  blood glucose (NO BRAND SPECIFIED) test strip  blood glucose (NO BRAND SPECIFIED) test strip  blood glucose monitoring (NO BRAND SPECIFIED) meter device kit  blood glucose monitoring (NO BRAND SPECIFIED) meter device kit  buPROPion (WELLBUTRIN XL) 150 MG 24 hr tablet  famotidine (PEPCID) 40 MG tablet  glimepiride (AMARYL) 4 MG tablet  glimepiride (AMARYL) 4 MG tablet  ibuprofen (ADVIL/MOTRIN) 600 MG tablet  insulin glargine (LANTUS PEN) 100 UNIT/ML pen  insulin pen needle (32G X 4 MM) 32G X 4 MM miscellaneous  Sharps Container MISC  sitagliptin (JANUVIA) 100 MG tablet      Allergies   Allergen Reactions    Fentanyl Blisters     Per pt, after taking this medication had blisters develope    Tylenol [Acetaminophen]  "Itching    Dulaglutide Rash    Insulin Lispro Rash     Patient reported    Penicillin V Other (See Comments) and Rash     Diffuse maculopapular rash + feels \"high\", per pt.      Family History  Family History   Problem Relation Age of Onset    Diabetes Brother     Diabetes Father     Alzheimer Disease Father     Unknown/Adopted Mother     Diabetes Paternal Grandfather     Cancer No family hx of         no skin cancer    Skin Cancer No family hx of         no famiy hx of skin cancer    Glaucoma No family hx of     Macular Degeneration No family hx of      Social History   Social History     Tobacco Use    Smoking status: Some Days     Packs/day: 0.25     Years: 40.00     Pack years: 10.00     Types: Cigarettes    Smokeless tobacco: Former   Substance Use Topics    Alcohol use: No     Alcohol/week: 0.0 standard drinks of alcohol     Comment: Last etoh in 2007    Drug use: Not Currently     Types: Marijuana, Methamphetamines         A medically appropriate review of systems was performed with pertinent positives and negatives noted in the HPI, and all other systems negative.    Physical Exam   BP: 138/89  Pulse: 95  Temp: 98.4  F (36.9  C)  Resp: 17  SpO2: 98 %  Physical Exam  Exam conducted with a chaperone present.   Constitutional:       General: He is not in acute distress.     Appearance: Normal appearance. He is not toxic-appearing.   HENT:      Head: Normocephalic and atraumatic.      Nose: Nose normal. No congestion.      Mouth/Throat:      Mouth: Mucous membranes are moist.      Pharynx: Oropharynx is clear.   Eyes:      Extraocular Movements: Extraocular movements intact.      Pupils: Pupils are equal, round, and reactive to light.   Cardiovascular:      Rate and Rhythm: Normal rate and regular rhythm.   Pulmonary:      Effort: Pulmonary effort is normal. No respiratory distress.      Breath sounds: No wheezing.   Abdominal:      General: There is no distension.      Palpations: Abdomen is soft.      " Tenderness: There is no abdominal tenderness.   Genitourinary:     Comments: Penile implant present. The reservoir and hardware appear intact on palpation and are not tender. There is some right scrotal and testicular tenderness. No other mass appreciated. No hernia palpated  Palpable tender inguinal lymphadenopathy  Musculoskeletal:         General: Normal range of motion.      Cervical back: Normal range of motion.   Skin:     General: Skin is warm and dry.   Neurological:      General: No focal deficit present.      Mental Status: He is alert and oriented to person, place, and time.           ED Course, Procedures, & Data      Procedures                      Results for orders placed or performed during the hospital encounter of 09/12/23   US Testicular & Scrotum w Doppler Ltd     Status: Abnormal   Result Value Ref Range    Radiologist flags Heterogeneous atrophic appearance of the right (Urgent)     Narrative    EXAMINATION: US TESTICULAR AND SCROTAL, 9/12/2023 1:48 PM     COMPARISON: CT abdomen and pelvis 8/7/2023.    HISTORY: Right-sided testicular pain, has penile implant.    TECHNIQUE: The scrotum was scanned in standard fashion with  specialized ultrasound transducer(s) using grey scale, color Doppler,  and spectral flow  techniques.    Findings:  Normal homogenous sonographic appearance of the left testicular  parenchyma. Heterogeneous appearance of the right testicular  parenchyma with intermixed hypoechoic regions as compared to the  parenchyma of the left testicle. No evidence of a focal lesion  bilaterally.  The right testicle measures 3.8 x 2.5 x 1.3 cm with a  volume of 6.4 cc and the left measures 4.1 x 2.6 x 1.7 cm with a  volume of 9.4 cc. The right testicle appears slightly high riding and  partially retracted inguinal canal.     Color doppler examination reveals intact vascular flow to the  bilateral testicles, although appears moderately reduced on the right  as compared to left.    There is  no evidence of a significant hydrocele or varicocele.    Both the right and left epididymis are within normal limits. Small 4  mm left epididymal head cyst.    Penile prosthesis components partially visualized.      Impression    Impression:   Reduced vascularity with heterogeneous appearance of the right  testicular parenchyma, is nonspecific. Right testicle decreased in  size as compared to left. No additional findings to suggest acute  torsion and patient's symptoms reportedly appear to be associated with  the penile prosthesis rather than the right testicle as per  technologist. Findings may be related to a remote and/or chronic  process, potentially a prior infarct, previous torsion, trauma or  orchitis, rather than an acute process. Correlate with clinical  presentation and prior history.      [Urgent Result: Heterogeneous atrophic appearance of the right  testicle would be more in keeping with a subacute/chronic insult with  acute torsion unlikely as discussed above.]    Finding was identified on 9/12/2023 1:48 PM.     Dr. Ritter was contacted by Dr. Boswell at 9/12/2023 2:13 PM and  verbalized understanding of the urgent finding.     I have personally reviewed the examination and initial interpretation  and I agree with the findings.    APRIL ELIZONDO MD         SYSTEM ID:  [id number]   CT Abdomen Pelvis w Contrast     Status: None    Narrative    EXAMINATION: CT ABDOMEN PELVIS W CONTRAST, 9/12/2023 4:10 PM    INDICATION: groin pain palpable tender lymphadenopathy    COMPARISON STUDY: CT abdomen and pelvis 8/6/2023    TECHNIQUE: CT scan of the abdomen and pelvis was performed on  multidetector CT scanner using volumetric acquisition technique and  images were reconstructed in multiple planes with variable thickness  and reviewed on dedicated workstations.     CONTRAST: 100 mL Isovue-370 IV without oral contrast    CT scan radiation dose is optimized to minimum requisite dose using  automated dose  modulation techniques.    FINDINGS:    Lower thorax: Stable 6 mm right middle lobe nodule (series 4, image  1).     Liver: No mass. No intrahepatic biliary ductal dilation. Hypodensity  in the right lobe of the liver along the falciform ligament favored to  represent focal fatty deposition.    Biliary System: Cholelithiasis. No extrahepatic biliary dilation.    Pancreas: No mass or pancreatic ductal dilation.    Adrenal glands: No mass or nodules    Spleen: Normal.    Kidneys: No suspicious mass, obstructing calculus or hydronephrosis.  Focal right renal cortical hypodensities which are too small to  characterize.    Gastrointestinal tract: Normal caliber small bowel. Appendectomy.  Similar gastric distention.     Pelvis: The urinary bladder is nondistended with diffuse concentric  wall thickening. Penile prosthesis with nondistended reservoir in the  pelvis. There is air within the prosthesis. Enlarged prostate.    Mesentery/peritoneum/retroperitoneum: No mass. No free air.    Lymph nodes: No significant lymphadenopathy. Prominent  benign-appearing inguinal lymph nodes with preserved fatty helen.    Vasculature: No aneurysm of the abdominal aorta. Aortoiliac  atherosclerotic calcifications.    Soft tissues: Within normal limits.    Osseous structures: No aggressive or acute osseous lesion.      Impression    IMPRESSION:   1.  Gastric distention, similar to 8/6/2023, with interval resolution  of dilated small bowel.  2.  Unchanged benign-appearing ovoid inguinal lymph nodes with  preserved fatty helen.  3.  Diffuse bladder wall thickening may be due to underdistention or  obstruction from prostatomegaly. Recommend correlation with urinalysis  to rule out cystitis.    I have personally reviewed the examination and initial interpretation  and I agree with the findings.    JANIE VANN MD         SYSTEM ID:  [id number]   Comprehensive metabolic panel     Status: Abnormal   Result Value Ref Range    Sodium 133 (L) 136 -  145 mmol/L    Potassium 4.0 3.4 - 5.3 mmol/L    Chloride 100 98 - 107 mmol/L    Carbon Dioxide (CO2) 24 22 - 29 mmol/L    Anion Gap 9 7 - 15 mmol/L    Urea Nitrogen 15.0 8.0 - 23.0 mg/dL    Creatinine 0.71 0.67 - 1.17 mg/dL    Calcium 9.3 8.6 - 10.0 mg/dL    Glucose 428 (H) 70 - 99 mg/dL    Alkaline Phosphatase 117 40 - 129 U/L    AST 13 0 - 45 U/L    ALT 12 0 - 70 U/L    Protein Total 6.8 6.4 - 8.3 g/dL    Albumin 3.7 3.5 - 5.2 g/dL    Bilirubin Total 0.4 <=1.2 mg/dL    GFR Estimate >90 >60 mL/min/1.73m2   UA with Microscopic reflex to Culture     Status: Abnormal    Specimen: Urine, Clean Catch   Result Value Ref Range    Color Urine Light Yellow Colorless, Straw, Light Yellow, Yellow    Appearance Urine Clear Clear    Glucose Urine >=1000 (A) Negative mg/dL    Bilirubin Urine Negative Negative    Ketones Urine Negative Negative mg/dL    Specific Gravity Urine 1.034 1.003 - 1.035    Blood Urine Trace (A) Negative    pH Urine 6.0 5.0 - 7.0    Protein Albumin Urine Negative Negative mg/dL    Urobilinogen Urine Normal Normal, 2.0 mg/dL    Nitrite Urine Negative Negative    Leukocyte Esterase Urine Negative Negative    RBC Urine <1 <=2 /HPF    WBC Urine 0 <=5 /HPF    Narrative    Urine Culture not indicated   CBC with platelets and differential     Status: Abnormal   Result Value Ref Range    WBC Count 6.8 4.0 - 11.0 10e3/uL    RBC Count 4.64 4.40 - 5.90 10e6/uL    Hemoglobin 13.4 13.3 - 17.7 g/dL    Hematocrit 39.6 (L) 40.0 - 53.0 %    MCV 85 78 - 100 fL    MCH 28.9 26.5 - 33.0 pg    MCHC 33.8 31.5 - 36.5 g/dL    RDW 13.7 10.0 - 15.0 %    Platelet Count 315 150 - 450 10e3/uL    % Neutrophils 60 %    % Lymphocytes 28 %    % Monocytes 9 %    % Eosinophils 2 %    % Basophils 1 %    % Immature Granulocytes 0 %    NRBCs per 100 WBC 0 <1 /100    Absolute Neutrophils 4.1 1.6 - 8.3 10e3/uL    Absolute Lymphocytes 1.9 0.8 - 5.3 10e3/uL    Absolute Monocytes 0.6 0.0 - 1.3 10e3/uL    Absolute Eosinophils 0.2 0.0 - 0.7 10e3/uL     Absolute Basophils 0.0 0.0 - 0.2 10e3/uL    Absolute Immature Granulocytes 0.0 <=0.4 10e3/uL    Absolute NRBCs 0.0 10e3/uL   CBC with platelets differential     Status: Abnormal    Narrative    The following orders were created for panel order CBC with platelets differential.  Procedure                               Abnormality         Status                     ---------                               -----------         ------                     CBC with platelets and d...[[id number]]  Abnormal            Final result                 Please view results for these tests on the individual orders.     Medications   famotidine (PEPCID) tablet 10 mg (has no administration in time range)   alum & mag hydroxide-simethicone (MAALOX) suspension 30 mL (has no administration in time range)   ketorolac (TORADOL) injection 15 mg (15 mg Intravenous $Given 9/12/23 4724)   sodium chloride (PF) 0.9% PF flush 75 mL (75 mLs Intravenous $Given 9/12/23 9165)   iopamidol (ISOVUE-370) solution 100 mL (100 mLs Intravenous $Given 9/12/23 1773)     Labs Ordered and Resulted from Time of ED Arrival to Time of ED Departure   COMPREHENSIVE METABOLIC PANEL - Abnormal       Result Value    Sodium 133 (*)     Potassium 4.0      Chloride 100      Carbon Dioxide (CO2) 24      Anion Gap 9      Urea Nitrogen 15.0      Creatinine 0.71      Calcium 9.3      Glucose 428 (*)     Alkaline Phosphatase 117      AST 13      ALT 12      Protein Total 6.8      Albumin 3.7      Bilirubin Total 0.4      GFR Estimate >90     ROUTINE UA WITH MICROSCOPIC REFLEX TO CULTURE - Abnormal    Color Urine Light Yellow      Appearance Urine Clear      Glucose Urine >=1000 (*)     Bilirubin Urine Negative      Ketones Urine Negative      Specific Gravity Urine 1.034      Blood Urine Trace (*)     pH Urine 6.0      Protein Albumin Urine Negative      Urobilinogen Urine Normal      Nitrite Urine Negative      Leukocyte Esterase Urine Negative      RBC Urine <1      WBC  Urine 0     CBC WITH PLATELETS AND DIFFERENTIAL - Abnormal    WBC Count 6.8      RBC Count 4.64      Hemoglobin 13.4      Hematocrit 39.6 (*)     MCV 85      MCH 28.9      MCHC 33.8      RDW 13.7      Platelet Count 315      % Neutrophils 60      % Lymphocytes 28      % Monocytes 9      % Eosinophils 2      % Basophils 1      % Immature Granulocytes 0      NRBCs per 100 WBC 0      Absolute Neutrophils 4.1      Absolute Lymphocytes 1.9      Absolute Monocytes 0.6      Absolute Eosinophils 0.2      Absolute Basophils 0.0      Absolute Immature Granulocytes 0.0      Absolute NRBCs 0.0       CT Abdomen Pelvis w Contrast   Final Result   IMPRESSION:    1.  Gastric distention, similar to 8/6/2023, with interval resolution   of dilated small bowel.   2.  Unchanged benign-appearing ovoid inguinal lymph nodes with   preserved fatty helen.   3.  Diffuse bladder wall thickening may be due to underdistention or   obstruction from prostatomegaly. Recommend correlation with urinalysis   to rule out cystitis.      I have personally reviewed the examination and initial interpretation   and I agree with the findings.      JANIE VANN MD            SYSTEM ID:  [id number]      US Testicular & Scrotum w Doppler Ltd   Final Result   Abnormal   Impression:    Reduced vascularity with heterogeneous appearance of the right   testicular parenchyma, is nonspecific. Right testicle decreased in   size as compared to left. No additional findings to suggest acute   torsion and patient's symptoms reportedly appear to be associated with   the penile prosthesis rather than the right testicle as per   technologist. Findings may be related to a remote and/or chronic   process, potentially a prior infarct, previous torsion, trauma or   orchitis, rather than an acute process. Correlate with clinical   presentation and prior history.         [Urgent Result: Heterogeneous atrophic appearance of the right   testicle would be more in keeping with a  subacute/chronic insult with   acute torsion unlikely as discussed above.]      Finding was identified on 9/12/2023 1:48 PM.       Dr. Ritter was contacted by Dr. Boswell at 9/12/2023 2:13 PM and   verbalized understanding of the urgent finding.       I have personally reviewed the examination and initial interpretation   and I agree with the findings.      APRIL ELIZONDO MD            SYSTEM ID:  [id number]             Critical care was not performed.     Medical Decision Making  The patient's presentation was of moderate complexity (an acute complicated injury).    The patient's evaluation involved:  review of 3+ test result(s) ordered prior to this encounter (CBC, CMP, UA from recent ED visits)  ordering and/or review of 3+ test(s) in this encounter (see separate area of note for details)  discussion of management or test interpretation with another health professional (see separate area of note for details)    The patient's management necessitated only low risk treatment.    Assessment & Plan    57yoM here for scrotal and groin pain. Vitals reassuring. Exam showed some right scrotal tenderness and lymphadenopathy. There was some initial concern for epididymitis or other infection. I obtained a scrotal US which did not show infection but did show signs of prior torsion on the right side no active torsion however. As he had persistent pain I consulted urology. They saw him in the ED and recommended outpatient follow up for his failed prosthesis and said that the decreased flow is more likely chronic and not an acute problem.    The patient did have some lymphadenopathy on exam and so to assess for other infection a CT was done. The CT showed no acute intra-abdominal process. UA showed no infection. CBC showed normal WBC count.     Patient was stable for discharge and reported symptomatic improvement. Return precautions provided, all questions answered. I recommended he follow up with his urologist closely.      I have reviewed the nursing notes. I have reviewed the findings, diagnosis, plan and need for follow up with the patient.    Discharge Medication List as of 9/12/2023  4:41 PM          Final diagnoses:   Scrotal pain   Enlarged lymph nodes       Evin Ritter MD  [facility] EMERGENCY DEPARTMENT  9/12/2023     Evin Ritter MD  09/12/23 [id number]

## 2023-09-13 ENCOUNTER — TELEPHONE (OUTPATIENT)
Dept: UROLOGY | Facility: CLINIC | Age: 58
End: 2023-09-13
Payer: COMMERCIAL

## 2023-09-13 NOTE — TELEPHONE ENCOUNTER
Briseyda called me back on my direct line and advised hasn't seen pt for months, and doesn't know where pt is now or any contact info. Briseyda advised to remove number from file due to not handling residents scheduling anymore, will remove number, and pt will have to call and schedule with Dr. Dumont or Dr. Cohen.

## 2023-09-19 ENCOUNTER — HOSPITAL ENCOUNTER (INPATIENT)
Facility: CLINIC | Age: 58
LOS: 7 days | Discharge: NURSING, REHAB OR LONG-TERM CARE | End: 2023-09-26
Attending: STUDENT IN AN ORGANIZED HEALTH CARE EDUCATION/TRAINING PROGRAM | Admitting: INTERNAL MEDICINE
Payer: COMMERCIAL

## 2023-09-19 ENCOUNTER — APPOINTMENT (OUTPATIENT)
Dept: CT IMAGING | Facility: CLINIC | Age: 58
End: 2023-09-19
Attending: STUDENT IN AN ORGANIZED HEALTH CARE EDUCATION/TRAINING PROGRAM
Payer: COMMERCIAL

## 2023-09-19 ENCOUNTER — APPOINTMENT (OUTPATIENT)
Dept: GENERAL RADIOLOGY | Facility: CLINIC | Age: 58
End: 2023-09-19
Attending: INTERNAL MEDICINE
Payer: COMMERCIAL

## 2023-09-19 DIAGNOSIS — K56.609 SBO (SMALL BOWEL OBSTRUCTION) (H): ICD-10-CM

## 2023-09-19 DIAGNOSIS — R22.9 MULTIPLE SKIN NODULES: Primary | ICD-10-CM

## 2023-09-19 DIAGNOSIS — R73.9 HYPERGLYCEMIA: ICD-10-CM

## 2023-09-19 DIAGNOSIS — R10.9 ABDOMINAL PAIN, UNSPECIFIED ABDOMINAL LOCATION: ICD-10-CM

## 2023-09-19 DIAGNOSIS — E11.65 TYPE 2 DIABETES MELLITUS WITH HYPERGLYCEMIA, WITH LONG-TERM CURRENT USE OF INSULIN (H): ICD-10-CM

## 2023-09-19 DIAGNOSIS — Z79.4 TYPE 2 DIABETES MELLITUS WITH HYPERGLYCEMIA, WITH LONG-TERM CURRENT USE OF INSULIN (H): ICD-10-CM

## 2023-09-19 DIAGNOSIS — R11.2 NAUSEA AND VOMITING, UNSPECIFIED VOMITING TYPE: ICD-10-CM

## 2023-09-19 LAB
ALBUMIN SERPL BCG-MCNC: 4.4 G/DL (ref 3.5–5.2)
ALP SERPL-CCNC: 134 U/L (ref 40–129)
ALT SERPL W P-5'-P-CCNC: 12 U/L (ref 0–70)
ANION GAP SERPL CALCULATED.3IONS-SCNC: 12 MMOL/L (ref 7–15)
AST SERPL W P-5'-P-CCNC: 16 U/L (ref 0–45)
BASOPHILS # BLD AUTO: 0.1 10E3/UL (ref 0–0.2)
BASOPHILS NFR BLD AUTO: 1 %
BILIRUB SERPL-MCNC: 0.6 MG/DL
BUN SERPL-MCNC: 15.7 MG/DL (ref 8–23)
CALCIUM SERPL-MCNC: 10 MG/DL (ref 8.6–10)
CHLORIDE SERPL-SCNC: 98 MMOL/L (ref 98–107)
CREAT SERPL-MCNC: 0.72 MG/DL (ref 0.67–1.17)
DEPRECATED HCO3 PLAS-SCNC: 25 MMOL/L (ref 22–29)
EGFRCR SERPLBLD CKD-EPI 2021: >90 ML/MIN/1.73M2
EOSINOPHIL # BLD AUTO: 0.2 10E3/UL (ref 0–0.7)
EOSINOPHIL NFR BLD AUTO: 2 %
ERYTHROCYTE [DISTWIDTH] IN BLOOD BY AUTOMATED COUNT: 13.8 % (ref 10–15)
GLUCOSE BLDC GLUCOMTR-MCNC: 152 MG/DL (ref 70–99)
GLUCOSE SERPL-MCNC: 248 MG/DL (ref 70–99)
HCT VFR BLD AUTO: 43.4 % (ref 40–53)
HGB BLD-MCNC: 14.9 G/DL (ref 13.3–17.7)
IMM GRANULOCYTES # BLD: 0.1 10E3/UL
IMM GRANULOCYTES NFR BLD: 1 %
LACTATE SERPL-SCNC: 1.6 MMOL/L (ref 0.7–2)
LIPASE SERPL-CCNC: 36 U/L (ref 13–60)
LYMPHOCYTES # BLD AUTO: 2.5 10E3/UL (ref 0.8–5.3)
LYMPHOCYTES NFR BLD AUTO: 22 %
MAGNESIUM SERPL-MCNC: 1.8 MG/DL (ref 1.7–2.3)
MCH RBC QN AUTO: 28.1 PG (ref 26.5–33)
MCHC RBC AUTO-ENTMCNC: 34.3 G/DL (ref 31.5–36.5)
MCV RBC AUTO: 82 FL (ref 78–100)
MONOCYTES # BLD AUTO: 0.7 10E3/UL (ref 0–1.3)
MONOCYTES NFR BLD AUTO: 6 %
NEUTROPHILS # BLD AUTO: 8 10E3/UL (ref 1.6–8.3)
NEUTROPHILS NFR BLD AUTO: 68 %
NRBC # BLD AUTO: 0 10E3/UL
NRBC BLD AUTO-RTO: 0 /100
PHOSPHATE SERPL-MCNC: 2.7 MG/DL (ref 2.5–4.5)
PLATELET # BLD AUTO: 477 10E3/UL (ref 150–450)
POTASSIUM SERPL-SCNC: 3.5 MMOL/L (ref 3.4–5.3)
PROT SERPL-MCNC: 8.2 G/DL (ref 6.4–8.3)
RBC # BLD AUTO: 5.3 10E6/UL (ref 4.4–5.9)
SODIUM SERPL-SCNC: 135 MMOL/L (ref 136–145)
WBC # BLD AUTO: 11.6 10E3/UL (ref 4–11)

## 2023-09-19 PROCEDURE — 82962 GLUCOSE BLOOD TEST: CPT

## 2023-09-19 PROCEDURE — 96361 HYDRATE IV INFUSION ADD-ON: CPT | Performed by: STUDENT IN AN ORGANIZED HEALTH CARE EDUCATION/TRAINING PROGRAM

## 2023-09-19 PROCEDURE — 99285 EMERGENCY DEPT VISIT HI MDM: CPT | Performed by: STUDENT IN AN ORGANIZED HEALTH CARE EDUCATION/TRAINING PROGRAM

## 2023-09-19 PROCEDURE — 258N000003 HC RX IP 258 OP 636: Performed by: STUDENT IN AN ORGANIZED HEALTH CARE EDUCATION/TRAINING PROGRAM

## 2023-09-19 PROCEDURE — 999N000065 XR ABDOMEN PORT 1 VIEW

## 2023-09-19 PROCEDURE — 250N000011 HC RX IP 250 OP 636: Performed by: PHYSICIAN ASSISTANT

## 2023-09-19 PROCEDURE — 96374 THER/PROPH/DIAG INJ IV PUSH: CPT | Mod: 59 | Performed by: STUDENT IN AN ORGANIZED HEALTH CARE EDUCATION/TRAINING PROGRAM

## 2023-09-19 PROCEDURE — 74177 CT ABD & PELVIS W/CONTRAST: CPT | Mod: 26 | Performed by: RADIOLOGY

## 2023-09-19 PROCEDURE — 250N000012 HC RX MED GY IP 250 OP 636 PS 637: Performed by: PHYSICIAN ASSISTANT

## 2023-09-19 PROCEDURE — 80053 COMPREHEN METABOLIC PANEL: CPT | Performed by: STUDENT IN AN ORGANIZED HEALTH CARE EDUCATION/TRAINING PROGRAM

## 2023-09-19 PROCEDURE — 74018 RADEX ABDOMEN 1 VIEW: CPT | Mod: 26 | Performed by: RADIOLOGY

## 2023-09-19 PROCEDURE — 83690 ASSAY OF LIPASE: CPT | Performed by: STUDENT IN AN ORGANIZED HEALTH CARE EDUCATION/TRAINING PROGRAM

## 2023-09-19 PROCEDURE — 99207 PR APP CREDIT; MD BILLING SHARED VISIT: CPT | Performed by: INTERNAL MEDICINE

## 2023-09-19 PROCEDURE — 74177 CT ABD & PELVIS W/CONTRAST: CPT

## 2023-09-19 PROCEDURE — 250N000013 HC RX MED GY IP 250 OP 250 PS 637: Performed by: STUDENT IN AN ORGANIZED HEALTH CARE EDUCATION/TRAINING PROGRAM

## 2023-09-19 PROCEDURE — 74018 RADEX ABDOMEN 1 VIEW: CPT

## 2023-09-19 PROCEDURE — 250N000011 HC RX IP 250 OP 636: Mod: JZ | Performed by: STUDENT IN AN ORGANIZED HEALTH CARE EDUCATION/TRAINING PROGRAM

## 2023-09-19 PROCEDURE — 36415 COLL VENOUS BLD VENIPUNCTURE: CPT | Performed by: STUDENT IN AN ORGANIZED HEALTH CARE EDUCATION/TRAINING PROGRAM

## 2023-09-19 PROCEDURE — 258N000003 HC RX IP 258 OP 636: Performed by: PHYSICIAN ASSISTANT

## 2023-09-19 PROCEDURE — 99285 EMERGENCY DEPT VISIT HI MDM: CPT | Mod: 25 | Performed by: STUDENT IN AN ORGANIZED HEALTH CARE EDUCATION/TRAINING PROGRAM

## 2023-09-19 PROCEDURE — 120N000002 HC R&B MED SURG/OB UMMC

## 2023-09-19 PROCEDURE — 250N000009 HC RX 250: Performed by: PHYSICIAN ASSISTANT

## 2023-09-19 PROCEDURE — 250N000011 HC RX IP 250 OP 636

## 2023-09-19 PROCEDURE — 83605 ASSAY OF LACTIC ACID: CPT | Performed by: STUDENT IN AN ORGANIZED HEALTH CARE EDUCATION/TRAINING PROGRAM

## 2023-09-19 PROCEDURE — 99223 1ST HOSP IP/OBS HIGH 75: CPT | Mod: FS | Performed by: PHYSICIAN ASSISTANT

## 2023-09-19 PROCEDURE — 85025 COMPLETE CBC W/AUTO DIFF WBC: CPT | Performed by: STUDENT IN AN ORGANIZED HEALTH CARE EDUCATION/TRAINING PROGRAM

## 2023-09-19 PROCEDURE — 83735 ASSAY OF MAGNESIUM: CPT | Performed by: PHYSICIAN ASSISTANT

## 2023-09-19 PROCEDURE — 96375 TX/PRO/DX INJ NEW DRUG ADDON: CPT | Performed by: STUDENT IN AN ORGANIZED HEALTH CARE EDUCATION/TRAINING PROGRAM

## 2023-09-19 PROCEDURE — 84100 ASSAY OF PHOSPHORUS: CPT | Performed by: PHYSICIAN ASSISTANT

## 2023-09-19 RX ORDER — ACETAMINOPHEN 325 MG/1
975 TABLET ORAL EVERY 6 HOURS PRN
Status: DISCONTINUED | OUTPATIENT
Start: 2023-09-19 | End: 2023-09-26 | Stop reason: HOSPADM

## 2023-09-19 RX ORDER — BUPROPION HYDROCHLORIDE 150 MG/1
150 TABLET ORAL DAILY
Status: DISCONTINUED | OUTPATIENT
Start: 2023-09-20 | End: 2023-09-19

## 2023-09-19 RX ORDER — ONDANSETRON 2 MG/ML
4 INJECTION INTRAMUSCULAR; INTRAVENOUS EVERY 6 HOURS PRN
Status: DISCONTINUED | OUTPATIENT
Start: 2023-09-19 | End: 2023-09-26 | Stop reason: HOSPADM

## 2023-09-19 RX ORDER — FAMOTIDINE 20 MG/1
40 TABLET, FILM COATED ORAL DAILY
Status: DISCONTINUED | OUTPATIENT
Start: 2023-09-20 | End: 2023-09-20

## 2023-09-19 RX ORDER — IOPAMIDOL 755 MG/ML
99 INJECTION, SOLUTION INTRAVASCULAR ONCE
Status: COMPLETED | OUTPATIENT
Start: 2023-09-19 | End: 2023-09-19

## 2023-09-19 RX ORDER — ONDANSETRON 2 MG/ML
4 INJECTION INTRAMUSCULAR; INTRAVENOUS EVERY 30 MIN PRN
Status: DISCONTINUED | OUTPATIENT
Start: 2023-09-19 | End: 2023-09-19

## 2023-09-19 RX ORDER — SODIUM CHLORIDE, SODIUM LACTATE, POTASSIUM CHLORIDE, CALCIUM CHLORIDE 600; 310; 30; 20 MG/100ML; MG/100ML; MG/100ML; MG/100ML
INJECTION, SOLUTION INTRAVENOUS CONTINUOUS
Status: DISCONTINUED | OUTPATIENT
Start: 2023-09-19 | End: 2023-09-21

## 2023-09-19 RX ORDER — ONDANSETRON 4 MG/1
4 TABLET, ORALLY DISINTEGRATING ORAL EVERY 6 HOURS PRN
Status: DISCONTINUED | OUTPATIENT
Start: 2023-09-19 | End: 2023-09-26 | Stop reason: HOSPADM

## 2023-09-19 RX ORDER — MUPIROCIN 20 MG/G
OINTMENT TOPICAL 3 TIMES DAILY
Status: COMPLETED | OUTPATIENT
Start: 2023-09-19 | End: 2023-09-24

## 2023-09-19 RX ORDER — DEXTROSE MONOHYDRATE 25 G/50ML
25-50 INJECTION, SOLUTION INTRAVENOUS
Status: DISCONTINUED | OUTPATIENT
Start: 2023-09-19 | End: 2023-09-23

## 2023-09-19 RX ORDER — HYDROMORPHONE HCL IN WATER/PF 6 MG/30 ML
0.2 PATIENT CONTROLLED ANALGESIA SYRINGE INTRAVENOUS ONCE
Status: DISCONTINUED | OUTPATIENT
Start: 2023-09-19 | End: 2023-09-19

## 2023-09-19 RX ORDER — NICOTINE POLACRILEX 4 MG
15-30 LOZENGE BUCCAL
Status: DISCONTINUED | OUTPATIENT
Start: 2023-09-19 | End: 2023-09-23

## 2023-09-19 RX ORDER — MAGNESIUM HYDROXIDE/ALUMINUM HYDROXICE/SIMETHICONE 120; 1200; 1200 MG/30ML; MG/30ML; MG/30ML
15 SUSPENSION ORAL 3 TIMES DAILY PRN
Status: DISCONTINUED | OUTPATIENT
Start: 2023-09-19 | End: 2023-09-26 | Stop reason: HOSPADM

## 2023-09-19 RX ORDER — HYDROMORPHONE HYDROCHLORIDE 1 MG/ML
0.5 INJECTION, SOLUTION INTRAMUSCULAR; INTRAVENOUS; SUBCUTANEOUS
Status: DISCONTINUED | OUTPATIENT
Start: 2023-09-19 | End: 2023-09-21

## 2023-09-19 RX ORDER — KETOROLAC TROMETHAMINE 15 MG/ML
15 INJECTION, SOLUTION INTRAMUSCULAR; INTRAVENOUS ONCE
Status: COMPLETED | OUTPATIENT
Start: 2023-09-19 | End: 2023-09-19

## 2023-09-19 RX ORDER — LIDOCAINE 40 MG/G
CREAM TOPICAL
Status: DISCONTINUED | OUTPATIENT
Start: 2023-09-19 | End: 2023-09-26 | Stop reason: HOSPADM

## 2023-09-19 RX ADMIN — SODIUM CHLORIDE 1000 ML: 9 INJECTION, SOLUTION INTRAVENOUS at 16:23

## 2023-09-19 RX ADMIN — HYDROMORPHONE HYDROCHLORIDE 0.5 MG: 1 INJECTION, SOLUTION INTRAMUSCULAR; INTRAVENOUS; SUBCUTANEOUS at 20:34

## 2023-09-19 RX ADMIN — IOPAMIDOL 99 ML: 755 INJECTION, SOLUTION INTRAVENOUS at 17:22

## 2023-09-19 RX ADMIN — INSULIN ASPART 1 UNITS: 100 INJECTION, SOLUTION INTRAVENOUS; SUBCUTANEOUS at 21:26

## 2023-09-19 RX ADMIN — SODIUM CHLORIDE, POTASSIUM CHLORIDE, SODIUM LACTATE AND CALCIUM CHLORIDE: 600; 310; 30; 20 INJECTION, SOLUTION INTRAVENOUS at 21:26

## 2023-09-19 RX ADMIN — ONDANSETRON 4 MG: 2 INJECTION INTRAMUSCULAR; INTRAVENOUS at 16:23

## 2023-09-19 RX ADMIN — MUPIROCIN: 20 OINTMENT TOPICAL at 21:50

## 2023-09-19 RX ADMIN — ALUMINUM HYDROXIDE, MAGNESIUM HYDROXIDE, AND SIMETHICONE 15 ML: 200; 200; 20 SUSPENSION ORAL at 21:25

## 2023-09-19 RX ADMIN — KETOROLAC TROMETHAMINE 15 MG: 15 INJECTION INTRAMUSCULAR; INTRAVENOUS at 16:24

## 2023-09-19 ASSESSMENT — ACTIVITIES OF DAILY LIVING (ADL)
ADLS_ACUITY_SCORE: 37

## 2023-09-19 NOTE — ED PROVIDER NOTES
"    [place] EMERGENCY DEPARTMENT ([facility])    9/19/23       ED PROVIDER NOTE     History     Chief Complaint   Patient presents with    Abdominal Pain     HPI  Andrew Collier is a 57 year old male with history of HIV/AIDS on Biktarvy complicated by inconsistent medication adherence, diabetes (on Januvia, Lantus, glimepiride), recurrent small bowel obstructions, methamphetamine abuse, prior penile implant placement, housing insecurity, food insecurity, multiple ED visits who presents to the emergency department with concerns for bowel obstruction.  Patient reports increased abdominal pain over the past few days.  Has had several prior bowel obstructions in the past.  He has had nausea and vomiting.  No bloody emesis or stools.  Last bowel movement was small amount of diarrhea earlier today.  He has not had any fevers, chills or other infectious symptoms.    Epic records reviewed, patient had hospitalization from 8/6-8/16/2023 for a bowel obstruction.  He had NG tube decompression with improvement of symptoms.  He had a Gastrografin study afterwards that showed no evidence of obstruction.          \"Unclear who is managing his HIV. Had multiple undetectable viral loads in the past, however last one was 10/2022 and most recent absolute CD4 count is down from prior (330 5/2023 < 526 5/2022). Last ID note found was from 1/2023 and he did not have insurance so had to defer management until insurance obtained again. Follow up due in 4/2023, however no note found. Patient reports taking his Biktarvy. Upon admission CD4 is 535, viral load 19,900-> detectable viral load may be due to patient not fully adhering to his regimen. Upon discharge patient is to continue taking biktarvy.\"    EXAMINATION: CT ABDOMEN PELVIS W CONTRAST, 9/12/2023 4:10 PM  IMPRESSION:   1. Gastric distention, similar to 8/6/2023, with interval resolution of dilated small bowel.  2. Unchanged benign-appearing ovoid inguinal lymph nodes " "with preserved fatty helen.  3. Diffuse bladder wall thickening may be due to underdistention or obstruction from prostatomegaly. Recommend correlation with urinalysis to rule out cystitis.             Physical Exam   BP: 124/75  Pulse: 59  Temp: 98.6  F (37  C)  Resp: 22  Height: 165.1 cm (5' 5\")  Weight: 72.6 kg (160 lb)  SpO2: 95 %  Physical Exam  General: No acute distress. Appears stated age.   HENT: MMM, no oropharyngeal lesions  Eyes: PERRL, normal sclerae   Cardio: Regular rate, extremities well perfused  Resp: Normal work of breathing, normal respiratory rate  Abd: Mild diffuse abdominal tenderness  Neuro: alert and fully oriented. CN II-XII grossly intact. Grossly normal strength and sensation in all extremities.   MSK: no deformities. Grossly normal ROM in extremities.   Integumentary/Skin: no rash, normal color  Psych: normal affect, normal behavior      ED Course, Procedures, & Data      Procedures                      Results for orders placed or performed during the hospital encounter of 09/19/23   CT Abdomen Pelvis w Contrast     Status: None    Narrative    EXAMINATION: CT ABDOMEN PELVIS W CONTRAST, 9/19/2023 5:40 PM    INDICATION: pain, prior obstructions    COMPARISON STUDY:    TECHNIQUE: CT scan of the abdomen and pelvis was performed on  multidetector CT scanner using volumetric acquisition technique and  images were reconstructed in multiple planes with variable thickness  and reviewed on dedicated workstations.     CONTRAST: iopamidol (ISOVUE-370) solution 99 mL injected IV without  oral contrast    CT scan radiation dose is optimized to minimum requisite dose using  automated dose modulation techniques.    FINDINGS:    Lower thorax: Normal.    Liver: No mass. Focal fatty change/perfusion along the falciform  ligament. No intrahepatic biliary ductal dilation.    Biliary System: Cholelithiasis with no evidence of acute  cholecystitis. No extrahepatic biliary ductal dilation.    Pancreas: No mass " or pancreatic ductal dilation.    Adrenal glands: No mass or nodules    Spleen: Normal.    Kidneys: No obstructing calculus or hydronephrosis. Slight hypodense  regions in the mid right kidney similar to prior study, for example in  the kidney, series 4 image 165, possibly perfusional versus related to  patient's anatomy as this has a similar appearance on 8/6/2023 study.    Gastrointestinal tract: The appendix is indistinct. Focal area of  small bowel dilation in the left lower quadrant with visualized points  of transition in the left lower quadrant (series 6 image 26, 28, and  34). Unremarkable large bowel.    Mesentery/peritoneum/retroperitoneum: No mass. No free fluid or air.    Lymph nodes: No significant intra-abdominal lymphadenopathy. Bilateral  prominent inguinal lymph nodes with fatty helen likely reactive,  unchanged from prior.    Vasculature: Patent major abdominal vasculature.    Pelvis: Well-distended urinary bladder with bladder wall thickening.   Prostate is enlarged. Penile pump. The reservoir in the pelvis is  collapsed and contains scattered foci of air, unchanged from prior  however this was fluid-filled 8/7/2023 CT study.    Osseous structures: No aggressive or acute osseous lesion.      Soft tissues: Within normal limits.      Impression    IMPRESSION:   1. Focal area of small bowel dilation in the left lower quadrant with  multiple points of transition, pattern suggestive of small bowel  obstruction secondary to adhesions.  2. Well-distended urinary bladder with bladder wall thickening,  suggestive of chronic obstruction likely secondary to benign prostatic  hyperplasia given substantially enlarged prostate.  3. The penile pump/prosthesis remains in place however the reservoir  in the pelvis is now collapsed and contains foci of air. There is also  air within the tubing. This is new from CT 8/6/2023 but unchanged from  recent CT 9/12/2023. Correlation with any urologic intervention.    I have  personally reviewed the examination and initial interpretation  and I agree with the findings.    APRIL ELIZONDO MD         SYSTEM ID:  [id number]   XR Abdomen Port 1 View     Status: None    Narrative    EXAM: XR ABDOMEN PORT 1 VIEW  LOCATION: [facility]  DATE: 9/19/2023    INDICATION: Verify nasogastric tube placement.  COMPARISON: CT abdomen pelvis 09/19/2023.      Impression    IMPRESSION:    Gastric drainage tube tip is in the proximal stomach. Side-port is likely in the distal esophagus just above the gastroesophageal junction. Recommend advancement of the tube by approximately 4-5 cm to ensure adequate drainage.   XR Abdomen Port 1 View     Status: None    Narrative    EXAM: XR ABDOMEN PORT 1 VIEW  LOCATION: [facility]  DATE: 9/19/2023    INDICATION: verify NG placement  COMPARISON: 09/19/2023      Impression    IMPRESSION: Nasogastric tube has been advanced with the tip projecting over the gastric fundus and the side port Potts within the stomach which is gas-filled but not overtly distended.   Comprehensive metabolic panel     Status: Abnormal   Result Value Ref Range    Sodium 135 (L) 136 - 145 mmol/L    Potassium 3.5 3.4 - 5.3 mmol/L    Chloride 98 98 - 107 mmol/L    Carbon Dioxide (CO2) 25 22 - 29 mmol/L    Anion Gap 12 7 - 15 mmol/L    Urea Nitrogen 15.7 8.0 - 23.0 mg/dL    Creatinine 0.72 0.67 - 1.17 mg/dL    Calcium 10.0 8.6 - 10.0 mg/dL    Glucose 248 (H) 70 - 99 mg/dL    Alkaline Phosphatase 134 (H) 40 - 129 U/L    AST 16 0 - 45 U/L    ALT 12 0 - 70 U/L    Protein Total 8.2 6.4 - 8.3 g/dL    Albumin 4.4 3.5 - 5.2 g/dL    Bilirubin Total 0.6 <=1.2 mg/dL    GFR Estimate >90 >60 mL/min/1.73m2   Lipase     Status: Normal   Result Value Ref Range    Lipase 36 13 - 60 U/L   Lactic acid whole blood     Status: Normal   Result Value Ref Range    Lactic Acid 1.6 0.7 - 2.0 mmol/L   CBC with platelets and differential      Status: Abnormal   Result Value Ref Range    WBC Count 11.6 (H) 4.0 - 11.0 10e3/uL    RBC Count 5.30 4.40 - 5.90 10e6/uL    Hemoglobin 14.9 13.3 - 17.7 g/dL    Hematocrit 43.4 40.0 - 53.0 %    MCV 82 78 - 100 fL    MCH 28.1 26.5 - 33.0 pg    MCHC 34.3 31.5 - 36.5 g/dL    RDW 13.8 10.0 - 15.0 %    Platelet Count 477 (H) 150 - 450 10e3/uL    % Neutrophils 68 %    % Lymphocytes 22 %    % Monocytes 6 %    % Eosinophils 2 %    % Basophils 1 %    % Immature Granulocytes 1 %    NRBCs per 100 WBC 0 <1 /100    Absolute Neutrophils 8.0 1.6 - 8.3 10e3/uL    Absolute Lymphocytes 2.5 0.8 - 5.3 10e3/uL    Absolute Monocytes 0.7 0.0 - 1.3 10e3/uL    Absolute Eosinophils 0.2 0.0 - 0.7 10e3/uL    Absolute Basophils 0.1 0.0 - 0.2 10e3/uL    Absolute Immature Granulocytes 0.1 <=0.4 10e3/uL    Absolute NRBCs 0.0 10e3/uL   Magnesium     Status: Normal   Result Value Ref Range    Magnesium 1.8 1.7 - 2.3 mg/dL   Phosphorus     Status: Normal   Result Value Ref Range    Phosphorus 2.7 2.5 - 4.5 mg/dL   Glucose by meter     Status: Abnormal   Result Value Ref Range    GLUCOSE BY METER POCT 152 (H) 70 - 99 mg/dL   CBC with platelets differential     Status: Abnormal    Narrative    The following orders were created for panel order CBC with platelets differential.  Procedure                               Abnormality         Status                     ---------                               -----------         ------                     CBC with platelets and d...[[id number]]  Abnormal            Final result                 Please view results for these tests on the individual orders.     Medications   acetaminophen (TYLENOL) tablet 975 mg (has no administration in time range)   lidocaine 1 % 0.1-1 mL (has no administration in time range)   lidocaine (LMX4) cream (has no administration in time range)   sodium chloride (PF) 0.9% PF flush 3 mL (3 mLs Intracatheter $Given 9/19/23 7436)   sodium chloride (PF) 0.9% PF flush 3 mL (has no  administration in time range)   ondansetron (ZOFRAN ODT) ODT tab 4 mg (has no administration in time range)     Or   ondansetron (ZOFRAN) injection 4 mg (has no administration in time range)   HYDROmorphone (PF) (DILAUDID) injection 0.5 mg (0.5 mg Intravenous $Given 9/19/23 2034)   glucose gel 15-30 g (has no administration in time range)     Or   dextrose 50 % injection 25-50 mL (has no administration in time range)     Or   glucagon injection 1 mg (has no administration in time range)   insulin aspart (NovoLOG) injection (RAPID ACTING) (1 Units Subcutaneous $Given 9/19/23 2126)   bictegravir-emtricitabine-tenofovir (BIKTARVY) -25 MG per tablet 1 tablet (has no administration in time range)   famotidine (PEPCID) tablet 40 mg (has no administration in time range)   lactated ringers infusion ( Intravenous $New Bag 9/19/23 2126)   alum & mag hydroxide-simethicone (MAALOX) suspension 15 mL (15 mLs Oral $Given 9/19/23 2125)   mupirocin (BACTROBAN) 2 % ointment ( Topical $Given 9/19/23 2150)   sodium chloride 0.9% BOLUS 1,000 mL (0 mLs Intravenous Stopped 9/19/23 1732)   ketorolac (TORADOL) injection 15 mg (15 mg Intravenous $Given 9/19/23 1624)   sodium chloride (PF) 0.9% PF flush 75 mL (75 mLs Intravenous $Given 9/19/23 1722)   iopamidol (ISOVUE-370) solution 99 mL (99 mLs Intravenous $Given 9/19/23 1722)     Labs Ordered and Resulted from Time of ED Arrival to Time of ED Departure   COMPREHENSIVE METABOLIC PANEL - Abnormal       Result Value    Sodium 135 (*)     Potassium 3.5      Chloride 98      Carbon Dioxide (CO2) 25      Anion Gap 12      Urea Nitrogen 15.7      Creatinine 0.72      Calcium 10.0      Glucose 248 (*)     Alkaline Phosphatase 134 (*)     AST 16      ALT 12      Protein Total 8.2      Albumin 4.4      Bilirubin Total 0.6      GFR Estimate >90     CBC WITH PLATELETS AND DIFFERENTIAL - Abnormal    WBC Count 11.6 (*)     RBC Count 5.30      Hemoglobin 14.9      Hematocrit 43.4      MCV 82       MCH 28.1      MCHC 34.3      RDW 13.8      Platelet Count 477 (*)     % Neutrophils 68      % Lymphocytes 22      % Monocytes 6      % Eosinophils 2      % Basophils 1      % Immature Granulocytes 1      NRBCs per 100 WBC 0      Absolute Neutrophils 8.0      Absolute Lymphocytes 2.5      Absolute Monocytes 0.7      Absolute Eosinophils 0.2      Absolute Basophils 0.1      Absolute Immature Granulocytes 0.1      Absolute NRBCs 0.0     GLUCOSE BY METER - Abnormal    GLUCOSE BY METER POCT 152 (*)    LIPASE - Normal    Lipase 36     LACTIC ACID WHOLE BLOOD - Normal    Lactic Acid 1.6     MAGNESIUM - Normal    Magnesium 1.8     PHOSPHORUS - Normal    Phosphorus 2.7     GLUCOSE MONITOR NURSING POCT     XR Abdomen Port 1 View   Final Result   IMPRESSION: Nasogastric tube has been advanced with the tip projecting over the gastric fundus and the side port Potts within the stomach which is gas-filled but not overtly distended.      XR Abdomen Port 1 View   Final Result   IMPRESSION:      Gastric drainage tube tip is in the proximal stomach. Side-port is likely in the distal esophagus just above the gastroesophageal junction. Recommend advancement of the tube by approximately 4-5 cm to ensure adequate drainage.      CT Abdomen Pelvis w Contrast   Final Result   IMPRESSION:    1. Focal area of small bowel dilation in the left lower quadrant with   multiple points of transition, pattern suggestive of small bowel   obstruction secondary to adhesions.   2. Well-distended urinary bladder with bladder wall thickening,   suggestive of chronic obstruction likely secondary to benign prostatic   hyperplasia given substantially enlarged prostate.   3. The penile pump/prosthesis remains in place however the reservoir   in the pelvis is now collapsed and contains foci of air. There is also   air within the tubing. This is new from CT 8/6/2023 but unchanged from   recent CT 9/12/2023. Correlation with any urologic intervention.      I have  personally reviewed the examination and initial interpretation   and I agree with the findings.      APRIL ELIZONDO MD            SYSTEM ID:  [id number]             Critical care was not performed.     Medical Decision Making  The patient's presentation was of high complexity (an acute health issue posing potential threat to life or bodily function).    The patient's evaluation involved:  review of external note(s) from 1 sources (see separate area of note for details)  ordering and/or review of 3+ test(s) in this encounter (see separate area of note for details)  independent interpretation of testing performed by another health professional (see separate area of note for details)  discussion of management or test interpretation with another health professional (see separate area of note for details)    The patient's management necessitated high risk (a decision regarding hospitalization).    Assessment & Plan    nAdrew Collier 57-year-old male with a history of recurrent small bowel obstructions, HIV and IV drug use who presents with abdominal pain, nausea and vomiting.  He arrives hemodynamically stable vital signs within normal limits.  Abdominal pain work-up initiated including CT abdomen pelvis to evaluate for bowel obstruction and other intra-abdominal infections.    CBC with mildly elevated white blood cell count to 11.6.  Lactate was normal.  CMP within normal limits aside from mild hyperglycemia.  Lipase was normal.  CT abdomen pelvis does show small bowel obstruction with multiple transition points.  Consulted general surgery who recommend NG tube, bowel rest and conservative management.  Admit to the hospitalist service for ongoing cares.  Care signed out to the triage hospitalist.    I have reviewed the nursing notes. I have reviewed the findings, diagnosis, plan and need for follow up with the patient.    New Prescriptions    No medications on file       Final diagnoses:   SBO (small bowel  obstruction) (H)       Lucy Strong MD  [facility] EMERGENCY DEPARTMENT  9/19/2023     Lucy Strong MD  09/19/23 [id number]

## 2023-09-19 NOTE — ED TRIAGE NOTES
"Patient presents to the ED with c/o abdominal pain. Pt reports frequent bowel obstructions, and states current condition feels like one. Pt reports loose stool earlier this afternoon. Aox4. Pain 10/10.    /75   Pulse 59   Temp 98.6  F (37  C) (Oral)   Resp 22   Ht 1.651 m (5' 5\")   Wt 72.6 kg (160 lb)   SpO2 95%   BMI 26.63 kg/m         Triage Assessment       Row Name 09/19/23 1532       Triage Assessment (Adult)    Airway WDL WDL       Respiratory WDL    Respiratory WDL WDL       Skin Circulation/Temperature WDL    Skin Circulation/Temperature WDL WDL       Cardiac WDL    Cardiac WDL WDL       Peripheral/Neurovascular WDL    Peripheral Neurovascular WDL WDL       Cognitive/Neuro/Behavioral WDL    Cognitive/Neuro/Behavioral WDL WDL                    "

## 2023-09-20 ENCOUNTER — APPOINTMENT (OUTPATIENT)
Dept: GENERAL RADIOLOGY | Facility: CLINIC | Age: 58
End: 2023-09-20
Attending: STUDENT IN AN ORGANIZED HEALTH CARE EDUCATION/TRAINING PROGRAM
Payer: COMMERCIAL

## 2023-09-20 LAB
ALBUMIN SERPL BCG-MCNC: 3.5 G/DL (ref 3.5–5.2)
ALBUMIN UR-MCNC: 20 MG/DL
ALP SERPL-CCNC: 105 U/L (ref 40–129)
ALT SERPL W P-5'-P-CCNC: <5 U/L (ref 0–70)
AMORPH CRY #/AREA URNS HPF: ABNORMAL /HPF
ANION GAP SERPL CALCULATED.3IONS-SCNC: 11 MMOL/L (ref 7–15)
APPEARANCE UR: ABNORMAL
AST SERPL W P-5'-P-CCNC: 16 U/L (ref 0–45)
ATRIAL RATE - MUSE: 82 BPM
BILIRUB SERPL-MCNC: 0.6 MG/DL
BILIRUB UR QL STRIP: NEGATIVE
BUN SERPL-MCNC: 15.1 MG/DL (ref 8–23)
CALCIUM SERPL-MCNC: 9.6 MG/DL (ref 8.6–10)
CHLORIDE SERPL-SCNC: 103 MMOL/L (ref 98–107)
COLOR UR AUTO: YELLOW
CREAT SERPL-MCNC: 0.66 MG/DL (ref 0.67–1.17)
DEPRECATED HCO3 PLAS-SCNC: 25 MMOL/L (ref 22–29)
DIASTOLIC BLOOD PRESSURE - MUSE: NORMAL MMHG
EGFRCR SERPLBLD CKD-EPI 2021: >90 ML/MIN/1.73M2
ERYTHROCYTE [DISTWIDTH] IN BLOOD BY AUTOMATED COUNT: 13.7 % (ref 10–15)
GLUCOSE BLDC GLUCOMTR-MCNC: 100 MG/DL (ref 70–99)
GLUCOSE BLDC GLUCOMTR-MCNC: 105 MG/DL (ref 70–99)
GLUCOSE BLDC GLUCOMTR-MCNC: 113 MG/DL (ref 70–99)
GLUCOSE BLDC GLUCOMTR-MCNC: 346 MG/DL (ref 70–99)
GLUCOSE BLDC GLUCOMTR-MCNC: 91 MG/DL (ref 70–99)
GLUCOSE BLDC GLUCOMTR-MCNC: 99 MG/DL (ref 70–99)
GLUCOSE SERPL-MCNC: 105 MG/DL (ref 70–99)
GLUCOSE UR STRIP-MCNC: NEGATIVE MG/DL
HCT VFR BLD AUTO: 39.3 % (ref 40–53)
HGB BLD-MCNC: 13.2 G/DL (ref 13.3–17.7)
HGB UR QL STRIP: NEGATIVE
INTERPRETATION ECG - MUSE: NORMAL
KETONES UR STRIP-MCNC: NEGATIVE MG/DL
LEUKOCYTE ESTERASE UR QL STRIP: NEGATIVE
MAGNESIUM SERPL-MCNC: 1.8 MG/DL (ref 1.7–2.3)
MCH RBC QN AUTO: 28.7 PG (ref 26.5–33)
MCHC RBC AUTO-ENTMCNC: 33.6 G/DL (ref 31.5–36.5)
MCV RBC AUTO: 85 FL (ref 78–100)
MUCOUS THREADS #/AREA URNS LPF: PRESENT /LPF
NITRATE UR QL: NEGATIVE
P AXIS - MUSE: 38 DEGREES
PH UR STRIP: 7.5 [PH] (ref 5–7)
PHOSPHATE SERPL-MCNC: 3.8 MG/DL (ref 2.5–4.5)
PLATELET # BLD AUTO: 374 10E3/UL (ref 150–450)
POTASSIUM SERPL-SCNC: 3.4 MMOL/L (ref 3.4–5.3)
POTASSIUM SERPL-SCNC: 4.1 MMOL/L (ref 3.4–5.3)
PR INTERVAL - MUSE: 136 MS
PROT SERPL-MCNC: 6.7 G/DL (ref 6.4–8.3)
QRS DURATION - MUSE: 94 MS
QT - MUSE: 386 MS
QTC - MUSE: 450 MS
R AXIS - MUSE: -21 DEGREES
RBC # BLD AUTO: 4.6 10E6/UL (ref 4.4–5.9)
RBC URINE: 1 /HPF
SODIUM SERPL-SCNC: 139 MMOL/L (ref 136–145)
SP GR UR STRIP: 1.03 (ref 1–1.03)
SYSTOLIC BLOOD PRESSURE - MUSE: NORMAL MMHG
T AXIS - MUSE: 17 DEGREES
UROBILINOGEN UR STRIP-MCNC: NORMAL MG/DL
VENTRICULAR RATE- MUSE: 82 BPM
WBC # BLD AUTO: 9.3 10E3/UL (ref 4–11)
WBC URINE: 0 /HPF

## 2023-09-20 PROCEDURE — 82962 GLUCOSE BLOOD TEST: CPT

## 2023-09-20 PROCEDURE — 83735 ASSAY OF MAGNESIUM: CPT | Performed by: PHYSICIAN ASSISTANT

## 2023-09-20 PROCEDURE — 84100 ASSAY OF PHOSPHORUS: CPT | Performed by: PHYSICIAN ASSISTANT

## 2023-09-20 PROCEDURE — 250N000013 HC RX MED GY IP 250 OP 250 PS 637: Performed by: PHYSICIAN ASSISTANT

## 2023-09-20 PROCEDURE — 85027 COMPLETE CBC AUTOMATED: CPT | Performed by: PHYSICIAN ASSISTANT

## 2023-09-20 PROCEDURE — 80053 COMPREHEN METABOLIC PANEL: CPT | Performed by: PHYSICIAN ASSISTANT

## 2023-09-20 PROCEDURE — 258N000003 HC RX IP 258 OP 636: Performed by: PHYSICIAN ASSISTANT

## 2023-09-20 PROCEDURE — 99232 SBSQ HOSP IP/OBS MODERATE 35: CPT | Performed by: INTERNAL MEDICINE

## 2023-09-20 PROCEDURE — 74018 RADEX ABDOMEN 1 VIEW: CPT | Mod: 26 | Performed by: RADIOLOGY

## 2023-09-20 PROCEDURE — 250N000011 HC RX IP 250 OP 636: Mod: JZ | Performed by: INTERNAL MEDICINE

## 2023-09-20 PROCEDURE — 84132 ASSAY OF SERUM POTASSIUM: CPT | Performed by: INTERNAL MEDICINE

## 2023-09-20 PROCEDURE — 81001 URINALYSIS AUTO W/SCOPE: CPT | Performed by: INTERNAL MEDICINE

## 2023-09-20 PROCEDURE — 250N000013 HC RX MED GY IP 250 OP 250 PS 637: Performed by: INTERNAL MEDICINE

## 2023-09-20 PROCEDURE — 99252 IP/OBS CONSLTJ NEW/EST SF 35: CPT | Performed by: PHYSICIAN ASSISTANT

## 2023-09-20 PROCEDURE — 120N000002 HC R&B MED SURG/OB UMMC

## 2023-09-20 PROCEDURE — 36415 COLL VENOUS BLD VENIPUNCTURE: CPT | Performed by: PHYSICIAN ASSISTANT

## 2023-09-20 PROCEDURE — 250N000011 HC RX IP 250 OP 636: Performed by: PHYSICIAN ASSISTANT

## 2023-09-20 PROCEDURE — 74018 RADEX ABDOMEN 1 VIEW: CPT

## 2023-09-20 PROCEDURE — 36415 COLL VENOUS BLD VENIPUNCTURE: CPT | Performed by: INTERNAL MEDICINE

## 2023-09-20 RX ORDER — FAMOTIDINE 20 MG/1
40 TABLET, FILM COATED ORAL 2 TIMES DAILY
Status: DISCONTINUED | OUTPATIENT
Start: 2023-09-20 | End: 2023-09-26 | Stop reason: HOSPADM

## 2023-09-20 RX ORDER — PROCHLORPERAZINE MALEATE 5 MG
5 TABLET ORAL EVERY 6 HOURS PRN
Status: DISCONTINUED | OUTPATIENT
Start: 2023-09-20 | End: 2023-09-26 | Stop reason: HOSPADM

## 2023-09-20 RX ORDER — KETOROLAC TROMETHAMINE 15 MG/ML
15 INJECTION, SOLUTION INTRAMUSCULAR; INTRAVENOUS EVERY 6 HOURS PRN
Status: DISCONTINUED | OUTPATIENT
Start: 2023-09-20 | End: 2023-09-23

## 2023-09-20 RX ORDER — TAMSULOSIN HYDROCHLORIDE 0.4 MG/1
0.4 CAPSULE ORAL DAILY
Status: DISCONTINUED | OUTPATIENT
Start: 2023-09-20 | End: 2023-09-26 | Stop reason: HOSPADM

## 2023-09-20 RX ORDER — OXYCODONE HYDROCHLORIDE 5 MG/1
5 TABLET ORAL
Status: DISCONTINUED | OUTPATIENT
Start: 2023-09-20 | End: 2023-09-21

## 2023-09-20 RX ORDER — POTASSIUM CHLORIDE 750 MG/1
40 TABLET, EXTENDED RELEASE ORAL ONCE
Status: COMPLETED | OUTPATIENT
Start: 2023-09-20 | End: 2023-09-20

## 2023-09-20 RX ORDER — PROCHLORPERAZINE 25 MG
25 SUPPOSITORY, RECTAL RECTAL EVERY 12 HOURS PRN
Status: DISCONTINUED | OUTPATIENT
Start: 2023-09-20 | End: 2023-09-26 | Stop reason: HOSPADM

## 2023-09-20 RX ORDER — MAGNESIUM SULFATE HEPTAHYDRATE 40 MG/ML
2 INJECTION, SOLUTION INTRAVENOUS ONCE
Status: COMPLETED | OUTPATIENT
Start: 2023-09-20 | End: 2023-09-20

## 2023-09-20 RX ORDER — LIDOCAINE 4 G/G
2 PATCH TOPICAL
Status: DISCONTINUED | OUTPATIENT
Start: 2023-09-20 | End: 2023-09-26 | Stop reason: HOSPADM

## 2023-09-20 RX ADMIN — MUPIROCIN: 20 OINTMENT TOPICAL at 09:21

## 2023-09-20 RX ADMIN — POTASSIUM CHLORIDE 40 MEQ: 750 TABLET, EXTENDED RELEASE ORAL at 15:50

## 2023-09-20 RX ADMIN — MUPIROCIN: 20 OINTMENT TOPICAL at 15:50

## 2023-09-20 RX ADMIN — FAMOTIDINE 40 MG: 20 TABLET ORAL at 23:08

## 2023-09-20 RX ADMIN — KETOROLAC TROMETHAMINE 15 MG: 15 INJECTION, SOLUTION INTRAMUSCULAR; INTRAVENOUS at 23:19

## 2023-09-20 RX ADMIN — SODIUM CHLORIDE, POTASSIUM CHLORIDE, SODIUM LACTATE AND CALCIUM CHLORIDE: 600; 310; 30; 20 INJECTION, SOLUTION INTRAVENOUS at 23:09

## 2023-09-20 RX ADMIN — SODIUM CHLORIDE, POTASSIUM CHLORIDE, SODIUM LACTATE AND CALCIUM CHLORIDE: 600; 310; 30; 20 INJECTION, SOLUTION INTRAVENOUS at 10:18

## 2023-09-20 RX ADMIN — DIATRIZOATE MEGLUMINE AND DIATRIZOATE SODIUM 120 ML: 660; 100 SOLUTION ORAL; RECTAL at 10:19

## 2023-09-20 RX ADMIN — HYDROMORPHONE HYDROCHLORIDE 0.5 MG: 1 INJECTION, SOLUTION INTRAMUSCULAR; INTRAVENOUS; SUBCUTANEOUS at 04:37

## 2023-09-20 RX ADMIN — KETOROLAC TROMETHAMINE 15 MG: 15 INJECTION, SOLUTION INTRAMUSCULAR; INTRAVENOUS at 09:20

## 2023-09-20 RX ADMIN — INSULIN ASPART 5 UNITS: 100 INJECTION, SOLUTION INTRAVENOUS; SUBCUTANEOUS at 23:08

## 2023-09-20 RX ADMIN — MAGNESIUM SULFATE IN WATER 2 G: 40 INJECTION, SOLUTION INTRAVENOUS at 15:50

## 2023-09-20 RX ADMIN — TAMSULOSIN HYDROCHLORIDE 0.4 MG: 0.4 CAPSULE ORAL at 09:21

## 2023-09-20 RX ADMIN — HYDROMORPHONE HYDROCHLORIDE 0.5 MG: 1 INJECTION, SOLUTION INTRAMUSCULAR; INTRAVENOUS; SUBCUTANEOUS at 00:06

## 2023-09-20 RX ADMIN — BICTEGRAVIR SODIUM, EMTRICITABINE, AND TENOFOVIR ALAFENAMIDE FUMARATE 1 TABLET: 50; 200; 25 TABLET ORAL at 09:21

## 2023-09-20 ASSESSMENT — ACTIVITIES OF DAILY LIVING (ADL)
FALL_HISTORY_WITHIN_LAST_SIX_MONTHS: NO
ADLS_ACUITY_SCORE: 20
DOING_ERRANDS_INDEPENDENTLY_DIFFICULTY: NO
WEAR_GLASSES_OR_BLIND: NO
DRESSING/BATHING_DIFFICULTY: NO
ADLS_ACUITY_SCORE: 37
TOILETING_ISSUES: NO
ADLS_ACUITY_SCORE: 37
CONCENTRATING,_REMEMBERING_OR_MAKING_DECISIONS_DIFFICULTY: NO
ADLS_ACUITY_SCORE: 37
WALKING_OR_CLIMBING_STAIRS_DIFFICULTY: NO
ADLS_ACUITY_SCORE: 37
DIFFICULTY_EATING/SWALLOWING: NO
CHANGE_IN_FUNCTIONAL_STATUS_SINCE_ONSET_OF_CURRENT_ILLNESS/INJURY: NO
ADLS_ACUITY_SCORE: 37
ADLS_ACUITY_SCORE: 37
ADLS_ACUITY_SCORE: 20

## 2023-09-20 NOTE — PROGRESS NOTES
"Pt stated \"leave me alone so I can sleep\"- unable to get bg level. Pt made aware of repeat bladder scan and cath in 2 hours if unable to void per order  "

## 2023-09-20 NOTE — H&P
[facility] [place]    History and Physical - Hospitalist Service, GOLD TEAM        Date of Admission:  9/19/2023    Assessment & Plan      Andrew Collier is a 57 year old male admitted on 9/19/2023. 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 He presented to the ED with 1 day of obstipation and vomiting concerning for recurrent SBO.     Recurrent SBO  Noting day of diarrhea followed by no flatus or BM with associated vomiting and abdominal distention. Mild leukocytosis and thromocytosis suggestive of dehydration. Prelim CT with focal area of small bowel dilation in LLQ with multiple points of transition suggestive of SBO secondary to adhesions.   - Surgery consulted - plan for conservative management  - Pending final CT abd/pelvis   - NPO, NG to LIS  - mIVF LR @ 75 mL/hr overnight - reassess in AM   - Zofran PRN  - IV dilaudid 0.5 mg q3h PRN   - Monitor lytes    DM2  Recent Hgb A1c 13.3%. Endocrine consulted during prior admission and started on lantus 38 units daily in addition to januvia 100 mg daily and glimepiride 8 mg daily (4 mg daily on days when intake/food access lower) Reports taking only 27 units lantus in addition to short acting insulin at home as well but not on his med list.   - Hold lantus for now while NPO  - Hold glimepiride and januvia while inpatient  - MSSI and BG checks q4h  - Hypoglycemia protocol     HIV  Unclear who is managing his HIV. Had multiple undetectable viral loads in the past. Last ID note found was from 1/2023 and he did not have insurance so had to defer management until insurance obtained again. Patient reported taking his Biktarvy, however recent CD4 535, viral load 19,900 which raises concern for intermittently non-compliant vs unable to obtain his  "medications (however reports he is getting medications through a provider at [facility]). Needs to re-establish ID for follow up however apparently unable to accept patient at [facility] due to too many no shows. Was discharged with 30 day supply of Biktary during admission in 8/2023.   - Continue Biktarvy   - Will likely need to send with additional supply and potentially need to refer outside our system for ID management     Skin lesion  Small lesions on lower abdomen now with scab. Trace erythema surrounding lesion without warmth, pain or fluctuance. Reportedly started as an ingrown hair. Currently does not seem c/w with cellulitis but high risk given his immunosuppression/HIV status.  - Topical mupirocin TID x 5 days    - Low threshold to cover for cellulitis if worsening     BPH  Noted to have well distended bladder with wall thickening on CT s/o chronic obstruction. Enlarged prostate noted on prior imaging.   - I/Os, bladder scans     Housing instability  Staying at [facility] shelter.   - SW consulted for dispo planning     IV methamphetamine use   Reported ongoing occasional IV meth use during prior admission and cessation was discussed.     Hx of  penile prosthesis  Recent ED presentation due to c/f penile prosthesis burst. CT noted reservoir now collapsed and contains foci of air - unchanged from CT done on 9/12. Plan to follow-up with Urology in clinic.      Diet: NPO for Medical/Clinical Reasons Except for: Ice Chips  DVT Prophylaxis: Pneumatic Compression Devices  Potts Catheter: Not present  Lines: None     Cardiac Monitoring: None  Code Status: Full Code    Clinically Significant Risk Factors Present on Admission                      # DMII: A1C = 13.3 % (Ref range: <5.7 %) within past 6 months      # Overweight: Estimated body mass index is 26.63 kg/m  as calculated from the following:    Height as of this encounter: 1.651 m (5' 5\").    Weight as of this encounter: 72.6 kg (160 lb).          "     Disposition Plan      Expected Discharge Date: 09/21/2023                The patient's care was discussed with the Attending Physician, Dr. Quevedo and Patient.    Benita Brian PA-C  Hospitalist Service, [facility]  Securely message with [employer] (more info)  Text page via [facility] Paging/Directory   See signed in provider for up to date coverage information    ______________________________________________________________________    Chief Complaint   Vomiting, obstipation     History is obtained from the patient    History of Present Illness   Andrew Collier is a 57 year old male who presented to the ED with symptoms suggestive of recurrent bowel obstruction. Reports he developed diarrhea yesterday followed by not passing stool or flatus today. Abdomen more distended. Having nausea with vomiting. Reports compliance with his insulin - taking 27 units lantus in the AM and short acting insulin with meals but is out of short acting insulin at home. Has been taking his biktary and reports he has been getting refills from a provider at [facility].    Notes a small lesion on his lower abdomen that started as an ingrown hair but now scabbed over and slightly red. Not painful.       Past Medical History    Past Medical History:   Diagnosis Date    Acute appendicitis with localized peritonitis 1/31/2018    AIDS (H)     Allergic rhinitis due to other allergen     DNS    Anal dysplasia     Chronic abdominal pain     CNS toxoplasmosis (H)     COVID-19 1/11/2022    Diabetes type 2, controlled (H)     GERD (gastroesophageal reflux disease)     Herpes zoster 9/23/2016    History of seizure     History of substance abuse (H)     HIV (human immunodeficiency virus infection) (H)     HLD (hyperlipidemia)     Lung nodules     Periungual wart     Pneumonia 1/6/2019    PTSD (post-traumatic stress disorder)     Sleep apnea     doesn't use CPAP       Past Surgical  History   Past Surgical History:   Procedure Laterality Date    ANOSCOPY N/A 9/9/2020    Procedure: Exam Under Anesthesia, ANOSCOPY, fulgeration of rectal fissures with Rectal Biopsies;  Surgeon: Thanh Lundberg MD;  Location: [facility] OR    COLONOSCOPY Left 1/22/2016    Procedure: COMBINED COLONOSCOPY, SINGLE OR MULTIPLE BIOPSY/POLYPECTOMY BY BIOPSY;  Surgeon: Clark Saini MD;  Location: [facility] GI    ESOPHAGOSCOPY, GASTROSCOPY, DUODENOSCOPY (EGD), COMBINED N/A 6/6/2022    Procedure: ESOPHAGOGASTRODUODENOSCOPY, WITH BIOPSY;  Surgeon: Kecia Benítez MD;  Location: [facility] GI    HC EXPLORE UNDESC TESTIS,INGUIN/SCROTAL      LAPAROSCOPIC APPENDECTOMY N/A 1/31/2018    Procedure: LAPAROSCOPIC APPENDECTOMY;  LAPAROSCOPIC APPENDECTOMY;  Surgeon: Dawn Holt MD;  Location: [facility] OR    LAPAROSCOPY DIAGNOSTIC (GENERAL) N/A 7/26/2016    Procedure: LAPAROSCOPY DIAGNOSTIC (GENERAL);  Surgeon: Susannah Arriaga MD;  Location: [facility] OR    LAPAROSCOPY DIAGNOSTIC (GENERAL) N/A 4/16/2018    Procedure: LAPAROSCOPY DIAGNOSTIC (GENERAL);  Diagnostic laparoscopy and lysis of adhesions;  Surgeon: Prince Dowling MD;  Location: [facility] OR    OPTICAL TRACKING SYSTEM CRANIOTOMY, EXCISE TUMOR, COMBINED Left 4/10/2015    Procedure: COMBINED OPTICAL TRACKING SYSTEM CRANIOTOMY, EXCISE TUMOR;  Surgeon: Mirlande Colmenares MD;  Location: [facility] OR    REPAIR GAMEKEEPER'S THUMB Right 12/2/2016    Procedure: REPAIR LIGAMENT ULNAR COLLATERAL THUMB (GAMEKEEPER'S);  Surgeon: Evin Zamorano MD;  Location:  OR    [facility] NONSPECIFIC PROCEDURE      right forearm fracture       Prior to Admission Medications   Prior to Admission Medications   Prescriptions Last Dose Informant Patient Reported? Taking?   Alcohol Swabs PADS   No No   Sig: Use to swab the area of the injection or todd as directed Per insurance coverage   Sharps Container MISC   No No   Sig: Use as directed to dispose of needles, lancets and other sharps    bictegravir-emtricitabine-tenofovir (BIKTARVY) -25 MG per tablet   No No   Sig: Take 1 tablet by mouth daily   blood glucose (NO BRAND SPECIFIED) lancets standard  Self No No   Sig: Use to test blood sugar 1 time daily or as directed.   blood glucose (NO BRAND SPECIFIED) lancets standard   No No   Sig: To use to test glucose level in the blood Use to test blood sugar  2  times daily as directed. To accompany glucose monitor brands per insurance coverage.   blood glucose (NO BRAND SPECIFIED) test strip  Self No No   Sig: Use to test blood sugar 1 time daily or as directed.   blood glucose (NO BRAND SPECIFIED) test strip   No No   Sig: To use to test glucose level in the blood Use to test blood sugar  2 times daily as directed. To accompany glucose monitor brands per insurance coverage.   blood glucose monitoring (NO BRAND SPECIFIED) meter device kit  Self No No   Sig: Use to test blood sugar 1 time daily or as directed.   blood glucose monitoring (NO BRAND SPECIFIED) meter device kit   No No   Sig: Use to test blood sugar 4 times daily or as directed.   buPROPion (WELLBUTRIN XL) 150 MG 24 hr tablet   No No   Sig: Take 1 tablet (150 mg) by mouth daily   famotidine (PEPCID) 40 MG tablet   No No   Sig: Take 1 tablet (40 mg) by mouth daily   glimepiride (AMARYL) 4 MG tablet   No No   Sig: Take 2 tablets (8 mg) by mouth every morning (before breakfast)   glimepiride (AMARYL) 4 MG tablet   No No   Sig: Take 2 tablets (8 mg) by mouth every morning (before breakfast)   ibuprofen (ADVIL/MOTRIN) 600 MG tablet  Self No No   Sig: Take 1 tablet (600 mg) by mouth every 6 hours as needed for moderate pain (4-6)   insulin glargine (LANTUS PEN) 100 UNIT/ML pen   No No   Sig: Inject 38 Units Subcutaneous every morning   insulin pen needle (32G X 4 MM) 32G X 4 MM miscellaneous   No No   Sig: Use 4-5 pen needles daily or as directed.   sitagliptin (JANUVIA) 100 MG tablet   No No   Sig: Take 1 tablet (100 mg) by mouth daily       Facility-Administered Medications: None        Review of Systems    The 5 point Review of Systems is negative other than noted in the HPI or here.      Physical Exam   Vital Signs: Temp: 97.3  F (36.3  C) Temp src: Oral BP: (!) 153/94 Pulse: 80   Resp: 22 SpO2: 98 % O2 Device: None (Room air)    Weight: 160 lbs 0 oz    General Appearance: Awake. Alert and oriented x4. Laying in bed. No acute distress.   Eyes: Normal lids. Anicteric sclera. Pupils equal and round.   HEENT: Normocephalic, atraumatic. Nares patent. Mucous membranes moist.   Respiratory: Normal work of breathing on room air. Lungs CTAB. No wheezes.   Cardiovascular: RRR. S1, S2. No murmurs.  GI: Abdomen non-distended. Bowel sounds quiet. Tender throughout, no guarding.   Lymph/Hematologic: No abnormal or excessive bruising on exposed skin.   Skin: Warm, dry. No rashes on exposed skin. Small dime sized circumferential lesion on mid-lower quadrant with scab and mild surrounding erythema. No warmth or tenderness.   Musculoskeletal: Moves all extremities equally.   Neurologic: No focal deficits.     Medical Decision Making       75 MINUTES SPENT BY ME on the date of service doing chart review, history, exam, documentation & further activities per the note.      Data     I have personally reviewed the following data over the past 24 hrs:    11.6 (H)  \   14.9   / 477 (H)     135 (L) 98 15.7 /  152 (H)   3.5 25 0.72 \     ALT: 12 AST: 16 AP: 134 (H) TBILI: 0.6   ALB: 4.4 TOT PROTEIN: 8.2 LIPASE: 36     Procal: N/A CRP: N/A Lactic Acid: 1.6       Imaging results reviewed over the past 24 hrs:   Recent Results (from the past 24 hour(s))   CT Abdomen Pelvis w Contrast    Narrative    EXAMINATION: CT ABDOMEN PELVIS W CONTRAST, 9/19/2023 5:40 PM    INDICATION: pain, prior obstructions    COMPARISON STUDY:    TECHNIQUE: CT scan of the abdomen and pelvis was performed on  multidetector CT scanner using volumetric acquisition technique and  images were  reconstructed in multiple planes with variable thickness  and reviewed on dedicated workstations.     CONTRAST: iopamidol (ISOVUE-370) solution 99 mL injected IV without  oral contrast    CT scan radiation dose is optimized to minimum requisite dose using  automated dose modulation techniques.    FINDINGS:    Lower thorax: Normal.    Liver: No mass. Focal fatty change/perfusion along the falciform  ligament. No intrahepatic biliary ductal dilation.    Biliary System: Cholelithiasis with no evidence of acute  cholecystitis. No extrahepatic biliary ductal dilation.    Pancreas: No mass or pancreatic ductal dilation.    Adrenal glands: No mass or nodules    Spleen: Normal.    Kidneys: No obstructing calculus or hydronephrosis. Slight hypodense  regions in the mid right kidney similar to prior study, for example in  the kidney, series 4 image 165, possibly perfusional versus related to  patient's anatomy as this has a similar appearance on 8/6/2023 study.    Gastrointestinal tract: The appendix is indistinct. Focal area of  small bowel dilation in the left lower quadrant with visualized points  of transition in the left lower quadrant (series 6 image 26, 28, and  34). Unremarkable large bowel.    Mesentery/peritoneum/retroperitoneum: No mass. No free fluid or air.    Lymph nodes: No significant intra-abdominal lymphadenopathy. Bilateral  prominent inguinal lymph nodes with fatty helen likely reactive,  unchanged from prior.    Vasculature: Patent major abdominal vasculature.    Pelvis: Well-distended urinary bladder with bladder wall thickening.   Prostate is enlarged. Penile pump. The reservoir in the pelvis is  collapsed and contains scattered foci of air, unchanged from prior  however this was fluid-filled 8/7/2023 CT study.    Osseous structures: No aggressive or acute osseous lesion.      Soft tissues: Within normal limits.      Impression    IMPRESSION:   1. Focal area of small bowel dilation in the left lower  quadrant with  multiple points of transition, pattern suggestive of small bowel  obstruction secondary to adhesions.  2. Well-distended urinary bladder with bladder wall thickening,  suggestive of chronic obstruction likely secondary to benign prostatic  hyperplasia given substantially enlarged prostate.  3. The penile pump/prosthesis remains in place however the reservoir  in the pelvis is now collapsed and contains foci of air. There is also  air within the tubing. This is new from CT 8/6/2023 but unchanged from  recent CT 9/12/2023. Correlation with any urologic intervention.    I have personally reviewed the examination and initial interpretation  and I agree with the findings.    APRIL ELIZONDO MD         SYSTEM ID:  [id number]

## 2023-09-20 NOTE — CONSULTS
"Urology Consult History and Physical    Name: Andrew Collier    MRN: [id number]   YOB: 1965       We were asked to see Andrew Collier at the request of Dr. Mendoza for evaluation and treatment of the following chief complaint:          Chief Complaint:   - Air in reservoir of penile prosthesis  History is obtained from patient and review of records           History of Present Illness:   Andrew Collier is a 57 year old male with pmh significant for HLD, DM II, GERD, HIV (c/b HSV positive anal biopsies and CNS toxoplasmosis), Hx perforated appendicities s/p appendecomy, Hx multiple SBOs, PTSD and urologic hx of penile prosthesis 6/2014  (Dr. Samuel Torres) who was last seen in Urology on inpatient consult for groin pain and a malfunctioning IPP.  At that time his IPP wasn't pumping properly on exam.  But the penis, scrotum and tubing were non-tender without induration, erythema, crepitus.  He had tender inguinal LNs and underwent a CT AP with contrast showing normal inguinal nodes, decompressed-thick walled bladder and was otherwise normal.  Scrotal US showed a R atrophic testicle that was chronic.  UA was normal.  He was discharged.      Yesterday he presented to the ED with an SBO (transition point in LLQ) and an NGT has been placed.  On CT bladder wall was again noted to be thick-walled and suggestive of BPH obstruction.  The IPP reservoir and tubing now have foci of air within them which is the reason for urology consult.      Objectives:  - Vitals are normal.   - WBC was 11.6 yesterday and today is 9.3.  Also normal CMP with sCr 0.66mg/dL. UA hasn't been collected.   - Not on antibiotics.     Patient sts his  pain has completely resolved.  He denies pain in the penis, testicles, suprapubic region and he sts the pain in his BL groin has also resolved 100%.  He continues to be frustrated that the IPP doesn't work.  He has been pushing the pump and notes \"it " "doesn't have any water in it.\"           Past Medical History:     Past Medical History:   Diagnosis Date    Acute appendicitis with localized peritonitis 1/31/2018    AIDS (H)     Allergic rhinitis due to other allergen     DNS    Anal dysplasia     Chronic abdominal pain     CNS toxoplasmosis (H)     COVID-19 1/11/2022    Diabetes type 2, controlled (H)     GERD (gastroesophageal reflux disease)     Herpes zoster 9/23/2016    History of seizure     History of substance abuse (H)     HIV (human immunodeficiency virus infection) (H)     HLD (hyperlipidemia)     Lung nodules     Periungual wart     Pneumonia 1/6/2019    PTSD (post-traumatic stress disorder)     Sleep apnea     doesn't use CPAP            Past Surgical History:     Past Surgical History:   Procedure Laterality Date    ANOSCOPY N/A 9/9/2020    Procedure: Exam Under Anesthesia, ANOSCOPY, fulgeration of rectal fissures with Rectal Biopsies;  Surgeon: Thanh Lundberg MD;  Location: [facility]    COLONOSCOPY Left 1/22/2016    Procedure: COMBINED COLONOSCOPY, SINGLE OR MULTIPLE BIOPSY/POLYPECTOMY BY BIOPSY;  Surgeon: Clark Saini MD;  Location: [facility]    ESOPHAGOSCOPY, GASTROSCOPY, DUODENOSCOPY (EGD), COMBINED N/A 6/6/2022    Procedure: ESOPHAGOGASTRODUODENOSCOPY, WITH BIOPSY;  Surgeon: Kecia Benítez MD;  Location: [facility]    HC EXPLORE UNDESC TESTIS,INGUIN/SCROTAL      LAPAROSCOPIC APPENDECTOMY N/A 1/31/2018    Procedure: LAPAROSCOPIC APPENDECTOMY;  LAPAROSCOPIC APPENDECTOMY;  Surgeon: Dawn Holt MD;  Location: [facility]    LAPAROSCOPY DIAGNOSTIC (GENERAL) N/A 7/26/2016    Procedure: LAPAROSCOPY DIAGNOSTIC (GENERAL);  Surgeon: Susannah Arriaga MD;  Location: [facility]    LAPAROSCOPY DIAGNOSTIC (GENERAL) N/A 4/16/2018    Procedure: LAPAROSCOPY DIAGNOSTIC (GENERAL);  Diagnostic laparoscopy and lysis of adhesions;  Surgeon: Prince Dowling MD;  Location: [facility]    OPTICAL TRACKING SYSTEM CRANIOTOMY, EXCISE TUMOR, COMBINED Left 4/10/2015 " "   Procedure: COMBINED OPTICAL TRACKING SYSTEM CRANIOTOMY, EXCISE TUMOR;  Surgeon: Mirlande Colmenares MD;  Location: [facility]    REPAIR GAMEKEEPER'S THUMB Right 12/2/2016    Procedure: REPAIR LIGAMENT ULNAR COLLATERAL THUMB (GAMEKEEPER'S);  Surgeon: Evin Zamorano MD;  Location:  OR    [facility] NONSPECIFIC PROCEDURE      right forearm fracture            Social History:     Social History     Tobacco Use    Smoking status: Some Days     Packs/day: 0.25     Years: 40.00     Pack years: 10.00     Types: Cigarettes    Smokeless tobacco: Former   Substance Use Topics    Alcohol use: No     Alcohol/week: 0.0 standard drinks of alcohol     Comment: Last etoh in 2007            Family History:     Family History   Problem Relation Age of Onset    Diabetes Brother     Diabetes Father     Alzheimer Disease Father     Unknown/Adopted Mother     Diabetes Paternal Grandfather     Cancer No family hx of         no skin cancer    Skin Cancer No family hx of         no famiy hx of skin cancer    Glaucoma No family hx of     Macular Degeneration No family hx of             Allergies:     Allergies   Allergen Reactions    Fentanyl Blisters     Per pt, after taking this medication had blisters develope    Tylenol [Acetaminophen] Itching    Dulaglutide Rash    Insulin Lispro Rash     Patient reported    Penicillin V Other (See Comments) and Rash     Diffuse maculopapular rash + feels \"high\", per pt.             Medications:     Current Facility-Administered Medications   Medication    acetaminophen (TYLENOL) tablet 975 mg    alum & mag hydroxide-simethicone (MAALOX) suspension 15 mL    bictegravir-emtricitabine-tenofovir (BIKTARVY) -25 MG per tablet 1 tablet    glucose gel 15-30 g    Or    dextrose 50 % injection 25-50 mL    Or    glucagon injection 1 mg    diatrizoate meglumine-sodium (GASTROGRAFIN/GASTROVIEW) 66-10 % solution 120 mL    famotidine (PEPCID) tablet 40 mg    HYDROmorphone (PF) (DILAUDID) injection 0.5 mg    " insulin aspart (NovoLOG) injection (RAPID ACTING)    ketorolac (TORADOL) injection 15 mg    lactated ringers infusion    Lidocaine (LIDOCARE) 4 % Patch 2 patch    lidocaine (LMX4) cream    lidocaine 1 % 0.1-1 mL    mupirocin (BACTROBAN) 2 % ointment    ondansetron (ZOFRAN ODT) ODT tab 4 mg    Or    ondansetron (ZOFRAN) injection 4 mg    oxyCODONE (ROXICODONE) tablet 5 mg    prochlorperazine (COMPAZINE) injection 5 mg    Or    prochlorperazine (COMPAZINE) tablet 5 mg    Or    prochlorperazine (COMPAZINE) suppository 25 mg    sodium chloride (PF) 0.9% PF flush 3 mL    sodium chloride (PF) 0.9% PF flush 3 mL    tamsulosin (FLOMAX) capsule 0.4 mg     Current Outpatient Medications   Medication Sig    Alcohol Swabs PADS Use to swab the area of the injection or todd as directed Per insurance coverage    bictegravir-emtricitabine-tenofovir (BIKTARVY) -25 MG per tablet Take 1 tablet by mouth daily    blood glucose (NO BRAND SPECIFIED) lancets standard To use to test glucose level in the blood Use to test blood sugar  2  times daily as directed. To accompany glucose monitor brands per insurance coverage.    blood glucose (NO BRAND SPECIFIED) lancets standard Use to test blood sugar 1 time daily or as directed.    blood glucose (NO BRAND SPECIFIED) test strip To use to test glucose level in the blood Use to test blood sugar  2 times daily as directed. To accompany glucose monitor brands per insurance coverage.    blood glucose (NO BRAND SPECIFIED) test strip Use to test blood sugar 1 time daily or as directed.    blood glucose monitoring (NO BRAND SPECIFIED) meter device kit Use to test blood sugar 4 times daily or as directed.    blood glucose monitoring (NO BRAND SPECIFIED) meter device kit Use to test blood sugar 1 time daily or as directed.    buPROPion (WELLBUTRIN XL) 150 MG 24 hr tablet Take 1 tablet (150 mg) by mouth daily    famotidine (PEPCID) 40 MG tablet Take 1 tablet (40 mg) by mouth daily    glimepiride  (AMARYL) 4 MG tablet Take 2 tablets (8 mg) by mouth every morning (before breakfast)    glimepiride (AMARYL) 4 MG tablet Take 2 tablets (8 mg) by mouth every morning (before breakfast)    ibuprofen (ADVIL/MOTRIN) 600 MG tablet Take 1 tablet (600 mg) by mouth every 6 hours as needed for moderate pain (4-6)    insulin glargine (LANTUS PEN) 100 UNIT/ML pen Inject 38 Units Subcutaneous every morning    insulin pen needle (32G X 4 MM) 32G X 4 MM miscellaneous Use 4-5 pen needles daily or as directed.    Sharps Container MISC Use as directed to dispose of needles, lancets and other sharps    sitagliptin (JANUVIA) 100 MG tablet Take 1 tablet (100 mg) by mouth daily             Review of Systems:    ROS: See HPI for pertinent details.  Remainder of 10-point ROS negative.         Physical Exam:   VS:  T: 98.2    HR: 63    BP: 134/94    RR: 20   GEN:  AOx3.  NAD.  Pleasant.  HEENT:  Sclerae anicteric.  Conjunctivae pink.  Moist mucous membranes.  NGT in place.    LUNGS: Non-labored breathing.  :  Phallus circumcised.  Glans normal.  Meatus patent without stenosis, blood or discharge. IPP present within penis - shaft is soft and nontender.   Left testicle is palpably normal and nontender without masses.  Right testicle is small and high-riding, but nontender   IPP pump in dependent R hemiscrotum.  Site is nontender without matting.  The pump is able to be depressed but doesn't refill normally  The tubing tracks superiorly and is nontender along its course.    No suprapubic tenderness.  No groin tenderness.   Scrotal skin and groin - no discoloration, crepitus, erythema, warmth or tenderness or lesions          Data:   All laboratory data reviewed:    No results found for: PSA  Recent Labs   Lab 09/20/23  0616 09/19/23  1637   WBC 9.3 11.6*   HGB 13.2* 14.9    477*       Recent Labs   Lab 09/20/23  0919 09/20/23  0616 09/20/23  0431 09/20/23  0151 09/19/23  2118 09/19/23  1635   NA  --  139  --   --   --  135*    POTASSIUM  --  3.4  --   --   --  3.5   CHLORIDE  --  103  --   --   --  98   CO2  --  25  --   --   --  25   BUN  --  15.1  --   --   --  15.7   CR  --  0.66*  --   --   --  0.72   GLC 91 105* 105* 113*   < > 248*   LINDA  --  9.6  --   --   --  10.0   MAG  --  1.8  --   --   --  1.8   PHOS  --  3.8  --   --   --  2.7    < > = values in this interval not displayed.     No lab results found in last 7 days.    Invalid input(s): URINEBLOOD  Results for orders placed or performed during the hospital encounter of 09/24/20   Urine Culture Aerobic Bacterial    Specimen: Urine Midstream; Midstream Urine   Result Value Ref Range    Specimen Description Midstream Urine     Special Requests Specimen received in preservative     Culture Micro No growth      *Note: Due to a large number of results and/or encounters for the requested time period, some results have not been displayed. A complete set of results can be found in Results Review.       All pertinent imaging reviewed:  Narrative & Impression   EXAMINATION: CT ABDOMEN PELVIS W CONTRAST, 9/19/2023 5:40 PM     INDICATION: pain, prior obstructions     COMPARISON STUDY:     TECHNIQUE: CT scan of the abdomen and pelvis was performed on  multidetector CT scanner using volumetric acquisition technique and  images were reconstructed in multiple planes with variable thickness  and reviewed on dedicated workstations.      CONTRAST: iopamidol (ISOVUE-370) solution 99 mL injected IV without  oral contrast     CT scan radiation dose is optimized to minimum requisite dose using  automated dose modulation techniques.     FINDINGS:  Lower thorax: Normal.     Liver: No mass. Focal fatty change/perfusion along the falciform  ligament. No intrahepatic biliary ductal dilation.     Biliary System: Cholelithiasis with no evidence of acute  cholecystitis. No extrahepatic biliary ductal dilation.     Pancreas: No mass or pancreatic ductal dilation.  Adrenal glands: No mass or nodules  Spleen:  Normal.     Kidneys: No obstructing calculus or hydronephrosis. Slight hypodense  regions in the mid right kidney similar to prior study, for example in  the kidney, series 4 image 165, possibly perfusional versus related to  patient's anatomy as this has a similar appearance on 8/6/2023 study.     Gastrointestinal tract: The appendix is indistinct. Focal area of  small bowel dilation in the left lower quadrant with visualized points  of transition in the left lower quadrant (series 6 image 26, 28, and  34). Unremarkable large bowel.     Mesentery/peritoneum/retroperitoneum: No mass. No free fluid or air.     Lymph nodes: No significant intra-abdominal lymphadenopathy. Bilateral  prominent inguinal lymph nodes with fatty helen likely reactive,  unchanged from prior.     Vasculature: Patent major abdominal vasculature.     Pelvis: Well-distended urinary bladder with bladder wall thickening.   Prostate is enlarged. Penile pump. The reservoir in the pelvis is  collapsed and contains scattered foci of air, unchanged from prior  however this was fluid-filled 8/7/2023 CT study.     Osseous structures: No aggressive or acute osseous lesion.      Soft tissues: Within normal limits.                                                                 IMPRESSION:   1. Focal area of small bowel dilation in the left lower quadrant with  multiple points of transition, pattern suggestive of small bowel  obstruction secondary to adhesions.  2. Well-distended urinary bladder with bladder wall thickening,  suggestive of chronic obstruction likely secondary to benign prostatic  hyperplasia given substantially enlarged prostate.  3. The penile pump/prosthesis remains in place however the reservoir  in the pelvis is now collapsed and contains foci of air. There is also  air within the tubing. This is new from CT 8/6/2023 but unchanged from  recent CT 9/12/2023. Correlation with any urologic intervention.     I have personally reviewed the  examination and initial interpretation  and I agree with the findings.     APRIL ELIZONDO MD                 Impression and Plan:   Impression / Plan:   Andrew Collier is a 57 year old male with HLD, DM II, GERD, HIV (c/b HSV positive anal biopsies and CNS toxoplasmosis), Hx perforated appendicities s/p appendecomy, Hx multiple SBOs, PTSD and urologic hx of penile prosthesis 6/2014  (AMS LGX 18cm cylinders with 1cm rear tip extenders, Dr. Samuel Hall - [facility]) who has been admitted with an SBO.     Today urology was consulted for a CT finding of air in the penile prosthesis pump and tubing.  The patient notes that the pump isn't working, but he otherwise has no pain, erythema, induration or leukocytosis to suggest that the device is infected.  The IPP, which is typically a closed system, is not broken with a leak.  This explains the air.           - No acute urologic intervention required  - Mr. Fabián Collier will need followup with either Dr. Dumont or Dr. Cohen for IPP replacement as he desires a functioning device.  I will send a message to our coordinators to set up an appointment.  The patient could also return to Dr. Hall with [facility] if he wishes.     Urology will sign off  Discussed with Dr. Cohen    Thank you for the opportunity to participate in the care of Andrew Collier.     HAO Nathan-C  Urology Physician Assistant  Personal Pager: [phone]    Please call Job Code:   [phone] to reach the Urology resident or PA on call - Weekdays  [phone] to reach the Urology resident or PA on call - Weeknights and weekends

## 2023-09-20 NOTE — PROGRESS NOTES
[facility]    Medicine Progress Note - Hospitalist Service, GOLD TEAM 8    Date of Admission:  9/19/2023    Assessment & Plan   57 year old male admitted on 9/19/2023. 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 He presented to the ED with 1 day of obstipation and vomiting concerning for recurrent SBO.     # Vomiting and abdominal pain secondary to recurrent small bowel obstruction likely secondary to prior adhesions, all are present on admission  Noting day of diarrhea followed by no flatus or BM with associated vomiting and abdominal distention. Mild leukocytosis and thromocytosis are likely stress reaction.  CT with focal area of small bowel dilation in LLQ with multiple points of transition suggestive of SBO secondary to adhesions.  Try to minimize narcotics to help with medical resolution of the obstruction  - NPO, NG to LIS  - mIVF LR @ 75 mL/hr  -Target potassium above 4 and magnesium above 2, daily electrolytes  - Zofran and Compazine PRN  -Started lidocaine patch at left lower quadrant to minimize utilization of narcotics  -Started ketorolac intravenous at 15 mg every 6 hours again to minimize utilization of narcotics  -Started oxycodone 5 mg every 3 for moderate pain, continue IV dilaudid 0.5 mg q3h PRN for severe pain  -Being considered for Gastrografin challenge for diagnostic and therapeutic purpose by general surgery  -Awaiting further recommendations from general surgery    # Noncellulitic small skin lesion at lower abdomen, all appearing  Trace erythema surrounding lesion without warmth, pain or fluctuance. Reportedly started as an ingrown hair. Currently does not seem c/w with cellulitis but high risk given his immunosuppression/HIV status.  - Topical mupirocin TID x 5 days    -  Low threshold to cover for cellulitis if worsening     # Distended urinary bladder likely secondary to BPH with penile prosthesis having air in the reservoir  Noted to have well distended bladder with wall thickening on CT s/o chronic obstruction. Enlarged prostate noted on prior imaging.   - I/Os, bladder scans   -If the patient has residual urine of more than 250 cc, place an indwelling Potts catheter  -Started tamsulosin  -Requested evaluation by urology    #Chronic medical problems  Controlled hyperglycemia secondary to DM2, all are present on admission  Recent Hgb A1c 13.3%. Endocrine consulted during prior admission and started on lantus 38 units daily in addition to januvia 100 mg daily and glimepiride 8 mg daily (4 mg daily on days when intake/food access lower) Reports taking only 27 units lantus in addition to short acting insulin at home as well but not on his med list.   - Hold lantus for now while NPO  - Hold glimepiride and januvia while inpatient  - MSSI and BG checks q4h  - Hypoglycemia protocol     HIV, YOUSIF.  Unclear who is managing his HIV. Had multiple undetectable viral loads in the past. Last ID note found was from 1/2023 and he did not have insurance so had to defer management until insurance obtained again. Patient reported taking his Biktarvy, however recent CD4 535, viral load 19,900 which raises concern for intermittently non-compliant vs unable to obtain his medications (however reports he is getting medications through a provider at [facility]). Needs to re-establish ID for follow up however apparently unable to accept patient at [facility] due to too many no shows. Was discharged with 30 day supply of Biktary during admission in 8/2023.   - Continue Biktarvy   -Asked the patient to make an appointment with the ID clinic at [employer] [facility] since he feels that he continues to follow-up with them but Will likely need to send with additional supply and potentially need to refer outside our  "system for ID management     Housing instability  Staying at [facility].   - SW consulted for dispo planning     IV methamphetamine use   Reported ongoing occasional IV meth use during prior admission and cessation was discussed.  The patient would not utilize IV meth for the last 3 months.  The patient declines addiction medicine consult.         Diet: NPO for Medical/Clinical Reasons Except for: Ice Chips    DVT Prophylaxis: Pneumatic Compression Devices  Potts Catheter: Not present  Lines: None     Cardiac Monitoring: None  Code Status: Full Code      Clinically Significant Risk Factors Present on Admission                      # DMII: A1C = 13.3 % (Ref range: <5.7 %) within past 6 months    # Overweight: Estimated body mass index is 26.63 kg/m  as calculated from the following:    Height as of this encounter: 1.651 m (5' 5\").    Weight as of this encounter: 72.6 kg (160 lb).              Disposition Plan      Expected Discharge Date: 09/21/2023                  Norman Castillo MD  Hospitalist Service, [address]  Securely message with [email or web address] (more info)  Text page via [facility] Paging/Directory   See signed in provider for up to date coverage information  ______________________________________________________________________    Interval History   The patient reported improved pain in the left lower quadrant.  No reported nausea or vomiting after initiation of nasogastric tube.    Physical Exam   Vital Signs: Temp: 98.2  F (36.8  C) Temp src: Oral BP: (!) 142/85 Pulse: 83   Resp: 20 SpO2: 98 % O2 Device: None (Room air)    Weight: 160 lbs 0 oz    Constitutional: Awake, alert, cooperative, no apparent distress.  Eyes: Conjunctiva and pupils examined and normal.  HEENT: Moist mucous membranes, normal dentition.  Respiratory: Clear to auscultation bilaterally, no crackles or wheezing.  Cardiovascular: Regular rate and rhythm, normal S1 and S2, and no murmur " noted.  GI: Soft, non-distended, non-tender, normal bowel sounds.  Lymph/Hematologic: No anterior cervical or supraclavicular adenopathy.  Skin: No rashes, no cyanosis, no edema.  Musculoskeletal: No joint swelling, erythema or tenderness.  Neurologic: Cranial nerves 2-12 intact, normal strength and sensation.  Psychiatric: Alert, oriented to person, place and time, no obvious anxiety or depression.     Medical Decision Making       45 MINUTES SPENT BY ME on the date of service doing chart review, history, exam, documentation & further activities per the note.      Data     I have personally reviewed the following data over the past 24 hrs:    9.3  \   13.2 (L)   / 374     139 103 15.1 /  105 (H)   3.4 25 0.66 (L) \     ALT: <5 AST: 16 AP: 105 TBILI: 0.6   ALB: 3.5 TOT PROTEIN: 6.7 LIPASE: 36     Procal: N/A CRP: N/A Lactic Acid: 1.6         Imaging results reviewed over the past 24 hrs:   Recent Results (from the past 24 hour(s))   CT Abdomen Pelvis w Contrast    Narrative    EXAMINATION: CT ABDOMEN PELVIS W CONTRAST, 9/19/2023 5:40 PM    INDICATION: pain, prior obstructions    COMPARISON STUDY:    TECHNIQUE: CT scan of the abdomen and pelvis was performed on  multidetector CT scanner using volumetric acquisition technique and  images were reconstructed in multiple planes with variable thickness  and reviewed on dedicated workstations.     CONTRAST: iopamidol (ISOVUE-370) solution 99 mL injected IV without  oral contrast    CT scan radiation dose is optimized to minimum requisite dose using  automated dose modulation techniques.    FINDINGS:    Lower thorax: Normal.    Liver: No mass. Focal fatty change/perfusion along the falciform  ligament. No intrahepatic biliary ductal dilation.    Biliary System: Cholelithiasis with no evidence of acute  cholecystitis. No extrahepatic biliary ductal dilation.    Pancreas: No mass or pancreatic ductal dilation.    Adrenal glands: No mass or nodules    Spleen:  Normal.    Kidneys: No obstructing calculus or hydronephrosis. Slight hypodense  regions in the mid right kidney similar to prior study, for example in  the kidney, series 4 image 165, possibly perfusional versus related to  patient's anatomy as this has a similar appearance on 8/6/2023 study.    Gastrointestinal tract: The appendix is indistinct. Focal area of  small bowel dilation in the left lower quadrant with visualized points  of transition in the left lower quadrant (series 6 image 26, 28, and  34). Unremarkable large bowel.    Mesentery/peritoneum/retroperitoneum: No mass. No free fluid or air.    Lymph nodes: No significant intra-abdominal lymphadenopathy. Bilateral  prominent inguinal lymph nodes with fatty helen likely reactive,  unchanged from prior.    Vasculature: Patent major abdominal vasculature.    Pelvis: Well-distended urinary bladder with bladder wall thickening.   Prostate is enlarged. Penile pump. The reservoir in the pelvis is  collapsed and contains scattered foci of air, unchanged from prior  however this was fluid-filled 8/7/2023 CT study.    Osseous structures: No aggressive or acute osseous lesion.      Soft tissues: Within normal limits.      Impression    IMPRESSION:   1. Focal area of small bowel dilation in the left lower quadrant with  multiple points of transition, pattern suggestive of small bowel  obstruction secondary to adhesions.  2. Well-distended urinary bladder with bladder wall thickening,  suggestive of chronic obstruction likely secondary to benign prostatic  hyperplasia given substantially enlarged prostate.  3. The penile pump/prosthesis remains in place however the reservoir  in the pelvis is now collapsed and contains foci of air. There is also  air within the tubing. This is new from CT 8/6/2023 but unchanged from  recent CT 9/12/2023. Correlation with any urologic intervention.    I have personally reviewed the examination and initial interpretation  and I agree  with the findings.    APRIL ELIZONDO MD         SYSTEM ID:  [id number]   XR Abdomen Port 1 View    Narrative    EXAM: XR ABDOMEN PORT 1 VIEW  LOCATION: [facility]  DATE: 9/19/2023    INDICATION: Verify nasogastric tube placement.  COMPARISON: CT abdomen pelvis 09/19/2023.      Impression    IMPRESSION:    Gastric drainage tube tip is in the proximal stomach. Side-port is likely in the distal esophagus just above the gastroesophageal junction. Recommend advancement of the tube by approximately 4-5 cm to ensure adequate drainage.   XR Abdomen Port 1 View    Narrative    EXAM: XR ABDOMEN PORT 1 VIEW  LOCATION: [facility]  DATE: 9/19/2023    INDICATION: verify NG placement  COMPARISON: 09/19/2023      Impression    IMPRESSION: Nasogastric tube has been advanced with the tip projecting over the gastric fundus and the side port Potts within the stomach which is gas-filled but not overtly distended.

## 2023-09-20 NOTE — CONSULTS
Emergency General Surgery Consult   September 19, 2023    Assessment and Plan:  Andrew Collier is a 57 year old male with PMH of HIV, Type 2 DM, perforated appendicitis s/p appendectomy, substance abuse, and recurrent SBO admitted 9/19/23 for abd pain with CT showing SBO. Not peritonitic. Conservative management for suspected SBO initially with possible gastrografin challenge 9/20.     - Recommend conservative management of SBO:      - NPO     - NG tube placement and put to LIS     - IVFs     - Replete electrolytes (goal K>4, Mg>2, Phos>3),      - Encourage ambulation     - Minimize narcotics and anti-cholinergics     - Will consider gastrograffin in the morning after patient is decompressed     Discussed with chief resident and staff, Dr. Valenzuela, who agrees with the above plan.    Maranda Atkinson MD, PharmD  General Surgery, PGY2  Pager [phone]    CC: abdominal pain similar to previous bowel obstructions    HPI:  Andrew Collier is a 57 year old male with PMH of HIV, Type 2 DM, perforated appendicitis s/p appendectomy, substance abuse, and recurrent SBO presenting for left sided abdominal pain. He endorses L sided abd pain which is the same as he's had with previous bowel obstructions. He endorses nausea and has had 3 episodes of non-bloody emesis. He feels bloated. LBM yesterday. Not passing flatus since yesterday. He reports history of numerous bowel obstructions in past, 8-9 this year. All managed conservatively except for one with which he underwent ANASTASIIA (4/6/18 with Dr. Dowling).      ROS: 10 point ROS neg other than the symptoms noted above in the HPI.      Medical History:  Past Medical History:   Diagnosis Date    Acute appendicitis with localized peritonitis 1/31/2018    AIDS (H)     Allergic rhinitis due to other allergen     DNS    Anal dysplasia     Chronic abdominal pain     CNS toxoplasmosis (H)     COVID-19 1/11/2022    Diabetes type 2, controlled (H)     GERD (gastroesophageal reflux  disease)     Herpes zoster 9/23/2016    History of seizure     History of substance abuse (H)     HIV (human immunodeficiency virus infection) (H)     HLD (hyperlipidemia)     Lung nodules     Periungual wart     Pneumonia 1/6/2019    PTSD (post-traumatic stress disorder)     Sleep apnea     doesn't use CPAP       Surgical History:  Laparoscopic appendectomy  Diagnostic lap with ANASTASIIA    Social History:  Social History     Tobacco Use    Smoking status: Some Days     Packs/day: 0.25     Years: 40.00     Pack years: 10.00     Types: Cigarettes    Smokeless tobacco: Former   Substance Use Topics    Alcohol use: No     Alcohol/week: 0.0 standard drinks of alcohol     Comment: Last etoh in 2007    Drug use: Not Currently     Types: Marijuana, Methamphetamines       Family History:  Family History   Problem Relation Age of Onset    Diabetes Brother     Diabetes Father     Alzheimer Disease Father     Unknown/Adopted Mother     Diabetes Paternal Grandfather     Cancer No family hx of         no skin cancer    Skin Cancer No family hx of         no famiy hx of skin cancer    Glaucoma No family hx of     Macular Degeneration No family hx of        Medications:  Current Facility-Administered Medications   Medication    ondansetron (ZOFRAN) injection 4 mg     Current Outpatient Medications   Medication    Alcohol Swabs PADS    bictegravir-emtricitabine-tenofovir (BIKTARVY) -25 MG per tablet    blood glucose (NO BRAND SPECIFIED) lancets standard    blood glucose (NO BRAND SPECIFIED) lancets standard    blood glucose (NO BRAND SPECIFIED) test strip    blood glucose (NO BRAND SPECIFIED) test strip    blood glucose monitoring (NO BRAND SPECIFIED) meter device kit    blood glucose monitoring (NO BRAND SPECIFIED) meter device kit    buPROPion (WELLBUTRIN XL) 150 MG 24 hr tablet    famotidine (PEPCID) 40 MG tablet    glimepiride (AMARYL) 4 MG tablet    glimepiride (AMARYL) 4 MG tablet    ibuprofen (ADVIL/MOTRIN) 600 MG tablet     "insulin glargine (LANTUS PEN) 100 UNIT/ML pen    insulin pen needle (32G X 4 MM) 32G X 4 MM miscellaneous    Sharps Container MISC    sitagliptin (JANUVIA) 100 MG tablet       Allergies:     Allergies   Allergen Reactions    Fentanyl Blisters     Per pt, after taking this medication had blisters develope    Tylenol [Acetaminophen] Itching    Dulaglutide Rash    Insulin Lispro Rash     Patient reported    Penicillin V Other (See Comments) and Rash     Diffuse maculopapular rash + feels \"high\", per pt.        Exam:  B/P: 124/75, T: 98.6, P: 59, R: 22  No intake or output data in the 24 hours ending 09/19/23 1909    Gen: Non-toxic appearing, no acute distress  HEENT: Atraumatic, normocephalic  Neuro: Alert and oriented. No gross neurologic deficits   Pulm: nonlabored breathing, comfortable on room air , no cough  CV: RRR by radial pulse, noncyanotic   ABD: Soft, tender to palpation over left, central abdomen, non-distended, no peritonitis, No guarding or rebound  MSK:  Normal active range of motion, no edema  Skin: Warm, dry, no rashes or abrasions on exposed skin  Psych: Cooperative, appropriate mood and affect    Laboratory Results:  Lab Results   Component Value Date    WBC 11.6 09/19/2023    WBC 9.6 06/24/2021     Lab Results   Component Value Date    HGB 14.9 09/19/2023    HGB 13.5 06/24/2021     Lab Results   Component Value Date    HCT 43.4 09/19/2023    HCT 39.5 06/24/2021     Lab Results   Component Value Date     09/19/2023     06/24/2021       Last Basic Metabolic Panel:  Lab Results   Component Value Date     09/19/2023     06/24/2021      Lab Results   Component Value Date    POTASSIUM 3.5 09/19/2023    POTASSIUM 2.2 08/06/2023    POTASSIUM 3.8 02/01/2023    POTASSIUM 2.5 12/12/2021    POTASSIUM 3.2 06/24/2021     Lab Results   Component Value Date    CHLORIDE 98 09/19/2023    CHLORIDE 101 02/01/2023    CHLORIDE 107 06/24/2021     Lab Results   Component Value Date    LINDA 10.0 " 09/19/2023    LINDA 7.3 06/24/2021     Lab Results   Component Value Date    CO2 25 09/19/2023    CO2 29 02/01/2023    CO2 20 06/24/2021     Lab Results   Component Value Date    BUN 15.7 09/19/2023    BUN 22 02/01/2023    BUN 18 06/24/2021     Lab Results   Component Value Date    CR 0.72 09/19/2023    CR 0.81 06/24/2021     Lab Results   Component Value Date     09/19/2023     08/16/2023     02/01/2023     06/24/2021       Imaging:  CT abd pelvis   RESIDENT PRELIMINARY INTERPRETATION  IMPRESSION:   1. Focal area of small bowel dilation in the left lower quadrant with  multiple points of transition, pattern suggestive of small bowel  obstruction secondary to adhesions.  2. Well-distended urinary bladder with bladder wall thickening,  suggestive of chronic obstruction likely secondary to benign prostatic  hyperplasia given substantially enlarged prostate.   This result has not been signed. Information might be incomplete.

## 2023-09-21 LAB
ALBUMIN SERPL BCG-MCNC: 3 G/DL (ref 3.5–5.2)
ALP SERPL-CCNC: 103 U/L (ref 40–129)
ALT SERPL W P-5'-P-CCNC: 7 U/L (ref 0–70)
ANION GAP SERPL CALCULATED.3IONS-SCNC: 8 MMOL/L (ref 7–15)
AST SERPL W P-5'-P-CCNC: 12 U/L (ref 0–45)
BILIRUB SERPL-MCNC: 0.5 MG/DL
BUN SERPL-MCNC: 9 MG/DL (ref 8–23)
CALCIUM SERPL-MCNC: 8.4 MG/DL (ref 8.6–10)
CHLORIDE SERPL-SCNC: 98 MMOL/L (ref 98–107)
CREAT SERPL-MCNC: 0.69 MG/DL (ref 0.67–1.17)
DEPRECATED HCO3 PLAS-SCNC: 25 MMOL/L (ref 22–29)
EGFRCR SERPLBLD CKD-EPI 2021: >90 ML/MIN/1.73M2
ERYTHROCYTE [DISTWIDTH] IN BLOOD BY AUTOMATED COUNT: 13.6 % (ref 10–15)
GLUCOSE BLDC GLUCOMTR-MCNC: 246 MG/DL (ref 70–99)
GLUCOSE BLDC GLUCOMTR-MCNC: 351 MG/DL (ref 70–99)
GLUCOSE BLDC GLUCOMTR-MCNC: 359 MG/DL (ref 70–99)
GLUCOSE BLDC GLUCOMTR-MCNC: 404 MG/DL (ref 70–99)
GLUCOSE BLDC GLUCOMTR-MCNC: 417 MG/DL (ref 70–99)
GLUCOSE BLDC GLUCOMTR-MCNC: 441 MG/DL (ref 70–99)
GLUCOSE SERPL-MCNC: 430 MG/DL (ref 70–99)
HCT VFR BLD AUTO: 35.3 % (ref 40–53)
HGB BLD-MCNC: 11.9 G/DL (ref 13.3–17.7)
MAGNESIUM SERPL-MCNC: 2.4 MG/DL (ref 1.7–2.3)
MCH RBC QN AUTO: 28.5 PG (ref 26.5–33)
MCHC RBC AUTO-ENTMCNC: 33.7 G/DL (ref 31.5–36.5)
MCV RBC AUTO: 84 FL (ref 78–100)
PHOSPHATE SERPL-MCNC: 3.1 MG/DL (ref 2.5–4.5)
PLATELET # BLD AUTO: 334 10E3/UL (ref 150–450)
POTASSIUM SERPL-SCNC: 3.8 MMOL/L (ref 3.4–5.3)
PROT SERPL-MCNC: 6 G/DL (ref 6.4–8.3)
RBC # BLD AUTO: 4.18 10E6/UL (ref 4.4–5.9)
SODIUM SERPL-SCNC: 131 MMOL/L (ref 136–145)
WBC # BLD AUTO: 7.4 10E3/UL (ref 4–11)

## 2023-09-21 PROCEDURE — 250N000013 HC RX MED GY IP 250 OP 250 PS 637: Performed by: INTERNAL MEDICINE

## 2023-09-21 PROCEDURE — 83735 ASSAY OF MAGNESIUM: CPT | Performed by: PHYSICIAN ASSISTANT

## 2023-09-21 PROCEDURE — 80053 COMPREHEN METABOLIC PANEL: CPT | Performed by: PHYSICIAN ASSISTANT

## 2023-09-21 PROCEDURE — 258N000003 HC RX IP 258 OP 636: Performed by: PHYSICIAN ASSISTANT

## 2023-09-21 PROCEDURE — 36415 COLL VENOUS BLD VENIPUNCTURE: CPT | Performed by: PHYSICIAN ASSISTANT

## 2023-09-21 PROCEDURE — 250N000013 HC RX MED GY IP 250 OP 250 PS 637: Performed by: PHYSICIAN ASSISTANT

## 2023-09-21 PROCEDURE — 250N000009 HC RX 250: Performed by: PHYSICIAN ASSISTANT

## 2023-09-21 PROCEDURE — 250N000012 HC RX MED GY IP 250 OP 636 PS 637: Performed by: INTERNAL MEDICINE

## 2023-09-21 PROCEDURE — 99232 SBSQ HOSP IP/OBS MODERATE 35: CPT | Performed by: INTERNAL MEDICINE

## 2023-09-21 PROCEDURE — 120N000002 HC R&B MED SURG/OB UMMC

## 2023-09-21 PROCEDURE — 250N000011 HC RX IP 250 OP 636: Mod: JZ | Performed by: PHYSICIAN ASSISTANT

## 2023-09-21 PROCEDURE — 85018 HEMOGLOBIN: CPT | Performed by: PHYSICIAN ASSISTANT

## 2023-09-21 PROCEDURE — 84100 ASSAY OF PHOSPHORUS: CPT | Performed by: PHYSICIAN ASSISTANT

## 2023-09-21 PROCEDURE — 250N000011 HC RX IP 250 OP 636: Mod: JZ | Performed by: INTERNAL MEDICINE

## 2023-09-21 RX ORDER — FAMOTIDINE 20 MG/1
40 TABLET, FILM COATED ORAL DAILY
Status: DISCONTINUED | OUTPATIENT
Start: 2023-09-21 | End: 2023-09-22

## 2023-09-21 RX ORDER — POLYETHYLENE GLYCOL 3350 17 G/17G
17 POWDER, FOR SOLUTION ORAL 2 TIMES DAILY
Status: DISCONTINUED | OUTPATIENT
Start: 2023-09-21 | End: 2023-09-22

## 2023-09-21 RX ORDER — BUPROPION HYDROCHLORIDE 150 MG/1
150 TABLET ORAL DAILY
Status: DISCONTINUED | OUTPATIENT
Start: 2023-09-21 | End: 2023-09-26 | Stop reason: HOSPADM

## 2023-09-21 RX ORDER — HYDROMORPHONE HYDROCHLORIDE 1 MG/ML
0.5 INJECTION, SOLUTION INTRAMUSCULAR; INTRAVENOUS; SUBCUTANEOUS 2 TIMES DAILY PRN
Status: DISCONTINUED | OUTPATIENT
Start: 2023-09-21 | End: 2023-09-23

## 2023-09-21 RX ORDER — OXYCODONE HYDROCHLORIDE 5 MG/1
5 TABLET ORAL EVERY 6 HOURS PRN
Status: DISCONTINUED | OUTPATIENT
Start: 2023-09-21 | End: 2023-09-23

## 2023-09-21 RX ADMIN — INSULIN ASPART 3 UNITS: 100 INJECTION, SOLUTION INTRAVENOUS; SUBCUTANEOUS at 13:04

## 2023-09-21 RX ADMIN — INSULIN GLARGINE 20 UNITS: 100 INJECTION, SOLUTION SUBCUTANEOUS at 18:58

## 2023-09-21 RX ADMIN — BICTEGRAVIR SODIUM, EMTRICITABINE, AND TENOFOVIR ALAFENAMIDE FUMARATE 1 TABLET: 50; 200; 25 TABLET ORAL at 09:32

## 2023-09-21 RX ADMIN — SITAGLIPTIN 100 MG: 100 TABLET, FILM COATED ORAL at 18:58

## 2023-09-21 RX ADMIN — TAMSULOSIN HYDROCHLORIDE 0.4 MG: 0.4 CAPSULE ORAL at 09:32

## 2023-09-21 RX ADMIN — FAMOTIDINE 40 MG: 20 TABLET ORAL at 14:33

## 2023-09-21 RX ADMIN — SODIUM CHLORIDE, POTASSIUM CHLORIDE, SODIUM LACTATE AND CALCIUM CHLORIDE: 600; 310; 30; 20 INJECTION, SOLUTION INTRAVENOUS at 13:05

## 2023-09-21 RX ADMIN — FAMOTIDINE 40 MG: 20 TABLET ORAL at 18:57

## 2023-09-21 RX ADMIN — ONDANSETRON 4 MG: 2 INJECTION INTRAMUSCULAR; INTRAVENOUS at 18:51

## 2023-09-21 RX ADMIN — INSULIN ASPART 5 UNITS: 100 INJECTION, SOLUTION INTRAVENOUS; SUBCUTANEOUS at 09:32

## 2023-09-21 RX ADMIN — KETOROLAC TROMETHAMINE 15 MG: 15 INJECTION, SOLUTION INTRAMUSCULAR; INTRAVENOUS at 18:51

## 2023-09-21 RX ADMIN — MUPIROCIN: 20 OINTMENT TOPICAL at 09:32

## 2023-09-21 RX ADMIN — INSULIN ASPART 6 UNITS: 100 INJECTION, SOLUTION INTRAVENOUS; SUBCUTANEOUS at 04:49

## 2023-09-21 RX ADMIN — FAMOTIDINE 40 MG: 20 TABLET ORAL at 09:32

## 2023-09-21 RX ADMIN — BUPROPION HYDROCHLORIDE 150 MG: 150 TABLET, FILM COATED, EXTENDED RELEASE ORAL at 14:33

## 2023-09-21 RX ADMIN — LIDOCAINE 1 PATCH: 4 PATCH TOPICAL at 09:36

## 2023-09-21 RX ADMIN — POLYETHYLENE GLYCOL 3350 17 G: 17 POWDER, FOR SOLUTION ORAL at 19:01

## 2023-09-21 RX ADMIN — MUPIROCIN: 20 OINTMENT TOPICAL at 21:39

## 2023-09-21 ASSESSMENT — ACTIVITIES OF DAILY LIVING (ADL)
ADLS_ACUITY_SCORE: 20
DEPENDENT_IADLS:: INDEPENDENT
ADLS_ACUITY_SCORE: 20

## 2023-09-21 NOTE — PROVIDER NOTIFICATION
Provider notified (name): STEPHANY RAGLAND [ Msg Id [id number] ]  Reason for notification:Arrived to unit 7C without NG tube. Asking for popsicle and soda.   Recommendation/request given to provider:Need NG tube? OK for diet?  Response from provider: Returned call. OK to trial clear liquids and monitor for symptom development.

## 2023-09-21 NOTE — PROGRESS NOTES
"    [facility]    Progress Note - EGS Service       Date of Admission:  9/19/2023    Assessment & Plan: Surgery   57 year old male with PMH of HIV, Type 2 DM, perforated appendicitis s/p appendectomy, substance abuse, and recurrent SBO admitted 9/19/23 for abd pain with CT showing SBO. Not peritonitic. Conservative management for suspected SBO initially with possible gastrografin challenge 9/20.      - okay to advance to [facility]   - no acute surgical intervention  - rest of care per primary team    DVT Prophylaxis: Pneumatic Compression Devices  Code Status: Full Code      Clinically Significant Risk Factors           # Hypercalcemia: corrected calcium is >10.1, will monitor as appropriate    # Hypoalbuminemia: Lowest albumin = 3 g/dL at 9/21/2023  6:55 AM, will monitor as appropriate           # DMII: A1C = 13.3 % (Ref range: <5.7 %) within past 6 months, PRESENT ON ADMISSION  # Overweight: Estimated body mass index is 26.63 kg/m  as calculated from the following:    Height as of this encounter: 1.651 m (5' 5\").    Weight as of this encounter: 72.6 kg (160 lb)., PRESENT ON ADMISSION            Disposition Plan     Expected Discharge Date: 09/21/2023                 The patient's care was discussed with the Attending Physician, Dr. Dowling .    Farhana Yancey MD  [facility]  Non-urgent messages: Securely message with [employer] (more info)  Text page via [employer] Paging/Directory     ______________________________________________________________________    Interval History   NAEON. Abdo pain well controlled. Denies n/v    Physical Exam   Vital Signs: Temp: 98.1  F (36.7  C) Temp src: Oral BP: 131/75 Pulse: 102   Resp: 20 SpO2: 98 % O2 Device: None (Room air)    Weight: 160 lbs 0 oz  Intake/Output Summary (Last 24 hours) at 9/21/2023 0824  Last data filed at 9/20/2023 2309  Gross per 24 hour   Intake 2556.25 ml   Output 325 ml "   Net 2231.25 ml     General Appearance: Laying in bed, comfortable  Respiratory: NLB on RA  Cardiovascular: rrr  GI: soft, mildly distended, minimally tender          Data     I have personally reviewed the following data over the past 24 hrs:    7.4  \   11.9 (L)   / 334     131 (L) 98 9.0 /  246 (H)   3.8 25 0.69 \     ALT: 7 AST: 12 AP: 103 TBILI: 0.5   ALB: 3.0 (L) TOT PROTEIN: 6.0 (L) LIPASE: N/A

## 2023-09-21 NOTE — PLAN OF CARE
Goal Outcome Evaluation:    , 246. Pt has been snacking on pudding and gram crackers, wrappers found in room. Now on lantus, wellbutrin, januvia. Refused miralax. LR discontinued. PIV SL.  Open sore on R thigh, dressing changed. Open sore L lower abd, dressing changed.  3/10 lower leg pain. Lidocaine patches on bilateral thighs.    Possible discharge tomorrow morning?

## 2023-09-21 NOTE — PLAN OF CARE
Transferred from: Admitted from [facility] ED   Report received from:   Starr in ED        2 RN skin assessment completed by: not completed- pt refused full skin assessment      - Findings (add LDA if needed): open area right posterior leg  Care plan (primary problem) and education initiated if not already done: Y  MDRO education done if applicable: Y  Pt informed about policy regarding no IV pumps off unit:   Flu shot ordered? (October-April only): n/a  Detailed Belongings: see summary of care note        Goal Outcome Evaluation: Pt is due to void. Needs bladder scan. Updated oncoming RN.      Plan of Care Reviewed With: patient    Overall Patient Progress: improvingOverall Patient Progress: improving    Outcome Evaluation: C/o abdominal pain. Worse after eating chicken broth. PRN toradol x1. Up in room independently.

## 2023-09-21 NOTE — PROGRESS NOTES
"SPIRITUAL HEALTH SERVICES  SPIRITUAL ASSESSMENT Progress Note  [facility] 7C        REFERRAL SOURCE: Self initiated  visit, Anglican specific.     DEMOGRAPHIC: Pt Andrew Collier identifies as Anglican and is of  descent.        ILLNESS CIRCUMSTANCE: I introduced myself to Andrew as the Lead Anglican  and oriented him to [facility].  Assessed emotional/spiritual needs and resources while offering reflective conversation, which integrated elements of illness and family narratives.     Andrew stated that, \"I have been having repeated bowel obstructions\". He shared with me that he will be 58 this year and is happy to be a grandfather now. 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     SPIRITUAL INTERVENTIONS: Andrew welcomed Islamic incantations/prayer at bedside. We prayed together at his request. I also provided him with an Islamic prayer booklet for Anglican patients.      PLAN: I will follow up with Andrew for the duration of his stay. [facility] is available to Andrew per request.     Clari Aldrich  Lead Anglican   Pager [phone]    [facility] remains available 24/7 for emergent requests/referrals, either by having the switchboard page the on-call  or by entering an ASAP/STAT consult in Epic (this will also page the on-call ).    "

## 2023-09-21 NOTE — PROGRESS NOTES
Brief Medicine Note:    Patient arrived to unit without NG in place. Reporting he was able to eat now. Prelim Gastrogaffin study with Gastrogaffin noted throughout the colon. Patient reported BM today and RN was given report from the ED stating the same. Patient with frequent bowel obstructions and knows his symptoms well. Since NG fell out - at this point will hold on replacing and trial CLD. IF recurrence of symptoms, will need to return to NPO status and replace NG at that time.    - Discontinued order for NG to LIS for now  - Trial CLD  - Notify on-call provider if patient develops worsening nausea/vomiting or recurrent obstipation    Benita Brian PA-C  Internal Medicine [facility] Hospitalist  [facility]

## 2023-09-21 NOTE — PROGRESS NOTES
[facility]    Progress Note - EGS Service       Date of Admission:  9/19/2023    Assessment & Plan: Surgery   57 year old man with surgical history and multiple previous SBO here with SBO s/p GG challenge and ROBF    Diet as tolerated  Surgery team will sign off at this time, please call with questions      The patient's care was discussed with the Attending Physician, Dr. Dowling .    Radha Duke MD  [facility]  Non-urgent messages: Securely message with [employer] (more info)  Text page via [employer] Paging/Directory     ______________________________________________________________________    Interval History   No acute events. Having bowel function    Physical Exam   Vital Signs: Temp: 98.7  F (37.1  C) Temp src: Oral BP: 110/62 Pulse: 103   Resp: 18 SpO2: 97 % O2 Device: None (Room air)    Weight: 160 lbs 0 oz  Intake/Output Summary (Last 24 hours) at 9/21/2023 1719  Last data filed at 9/21/2023 1440  Gross per 24 hour   Intake 3036.25 ml   Output 500 ml   Net 2536.25 ml     General Appearance: Appears comfortable  Respiratory: NLB on RA  Cardiovascular: RRR  GI: Abdomen soft, NT, protuberant  Skin: Normal color and turgor  Other: WWP           Data     I have personally reviewed the following data over the past 24 hrs:    7.4  \   11.9 (L)   / 334     131 (L) 98 9.0 /  246 (H)   3.8 25 0.69 \     ALT: 7 AST: 12 AP: 103 TBILI: 0.5   ALB: 3.0 (L) TOT PROTEIN: 6.0 (L) LIPASE: N/A

## 2023-09-21 NOTE — CONSULTS
Care Management Initial Consult    General Information  Assessment completed with: VM-chart review,    Type of CM/SW Visit: Initial Assessment    Primary Care Provider verified and updated as needed: Yes   Readmission within the last 30 days: no previous admission in last 30 days (Discharged from [facility] on 8/16/2023)      Reason for Consult: discharge planning, utilization management concerns  Advance Care Planning: Advance Care Planning Reviewed: no concerns identified          Communication Assessment  Patient's communication style: spoken language (English or Bilingual)    Hearing Difficulty or Deaf: no   Wear Glasses or Blind: no    Cognitive  Cognitive/Neuro/Behavioral: .WDL except, mood/behavior           Mood/Behavior: angry          Living Environment:   People in home: other (see comments) (Congregate setting)     Current living Arrangements: shelter  Name of Facility: [facility]   Able to return to prior arrangements: yes       Family/Social Support:  Care provided by: self  Provides care for: no one  Marital Status: Single  Facility resident(s)/Staff          Description of Support System: Supportive    Support Assessment: Limited social contact and support    Current Resources:   Patient receiving home care services: No     Community Resources: County Programs, County Worker, HIV Counseling  Equipment currently used at home: none  Supplies currently used at home: None    Employment/Financial:  Employment Status: unemployed        Financial Concerns: No concerns identified   Referral to Financial Worker: No       Does the patient's insurance plan have a 3 day qualifying hospital stay waiver?  No    Lifestyle & Psychosocial Needs:  Social Determinants of Health     Food Insecurity: Not on file   Depression: Not at risk (8/27/2020)    PHQ-2     PHQ-2 Score: 0   Housing Stability: Not on file   Tobacco Use: High Risk (9/19/2023)    Patient History     Smoking Tobacco Use: Some Days     Smokeless Tobacco  Use: Former     Passive Exposure: Not on file   Financial Resource Strain: Not on file   Alcohol Use: Unknown (8/27/2020)    AUDIT-C     Frequency of Alcohol Consumption: Not on file     Average Number of Drinks: Patient refused     Frequency of Binge Drinking: Not on file   Transportation Needs: Not on file   Physical Activity: Not on file   Interpersonal Safety: Not on file   Stress: Not on file   Social Connections: Not on file       Functional Status:  Prior to admission patient needed assistance:   Dependent ADLs:: Independent  Dependent IADLs:: Independent       Mental Health Status:  Mental Health Status: No Current Concerns       Chemical Dependency Status:  Chemical Dependency Status:  (Past histoyr of marijuana and methamphetamine use)             Values/Beliefs:  Spiritual, Cultural Beliefs, Confucianism Practices, Values that affect care: no               Additional Information:  57 year old male admitted on 9/19/2023. 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 He presented to the ED with 1 day of obstipation and vomiting concerning for recurrent SBO     SW completed CMA via chart review. Per chart review patient was discharge from [facility] on 8/16/23. Patient resides in a shelter. The shelter is [facility]. The patient has previously reported he has been homeless for quite sometime. Patient is independent with ADLs and IADLs. For transportation patient will either utilize public transportation or walk to his destination. At the time of discharge patient will need transportation arranged back to [facility].    SW will continue to follow for discharge needs.     GERONIMO Vera  [address]   Office: [phone]  Pager: [phone]  [email or web address]

## 2023-09-21 NOTE — PROGRESS NOTES
[facility]    Medicine Progress Note - Hospitalist Service, GOLD TEAM 8    Date of Admission:  9/19/2023    Assessment & Plan   57 year old male admitted on 9/19/2023. 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 He presented to the ED with 1 day of obstipation and vomiting concerning for recurrent SBO.     # Vomiting and abdominal pain secondary to recurrent small bowel obstruction likely secondary to prior adhesions, all are present on admission  Noting day of diarrhea followed by no flatus or BM with associated vomiting and abdominal distention. Mild leukocytosis and thromocytosis are likely stress reaction.  CT with focal area of small bowel dilation in LLQ with multiple points of transition suggestive of SBO secondary to adhesions.  Try to minimize narcotics to help with medical resolution of the obstruction.    Abdominal pain improved.  Gastrografin study was performed which was within normal limits on 9/22.  The patient nasogastric tube was removed accidentally however the patient was able to tolerate clear liquid diet after that as such it was not placed again, appropriately so.  -Discontinued intravenous fluids  -Target potassium above 4 and magnesium above 2, daily electrolytes  -Continue Zofran and Compazine PRN  -Continue lidocaine patch at left lower quadrant to minimize utilization of narcotics  -Continue ketorolac intravenous at 15 mg every 6 hours again to minimize utilization of narcotics  -Changed the as needed oxycodone to every 4 hours for moderate pain, and the as needed intravenous Dilaudid to twice daily as needed for breakthrough   -Started full liquid diet with advancement as tolerated  -Started MiraLAX twice daily  -Awaiting further recommendations from general  surgery    # Noncellulitic small skin lesion at lower abdomen, all appearing  Trace erythema surrounding lesion without warmth, pain or fluctuance. Reportedly started as an ingrown hair. Currently does not seem c/w with cellulitis but high risk given his immunosuppression/HIV status.  - Topical mupirocin TID x 5 days    - Low threshold to cover for cellulitis if worsening     # Distended urinary bladder likely secondary to BPH with penile prosthesis having air in the reservoir  Noted to have well distended bladder with wall thickening on CT s/o chronic obstruction. Enlarged prostate noted on prior imaging.  No significant urinary retention reported  - I/Os, bladder scans   -Continiue tamsulosin  -Requested evaluation by urology    #Chronic medical problems  Controlled hyperglycemia secondary to DM2, all are present on admission  Recent Hgb A1c 13.3%. Endocrine consulted during prior admission and started on lantus 38 units daily in addition to januvia 100 mg daily and glimepiride 8 mg daily (4 mg daily on days when intake/food access lower) Reports taking only 27 units lantus in addition to short acting insulin at home as well but not on his med list.   -Resume Lantus at half dose of 20 units given resumption of oral intake  -Resume januvia  -Holding glimepiride while inpatient  - MSSI and BG checks q4h  - Hypoglycemia protocol     HIVYOUSIF.  Unclear who is managing his HIV. Had multiple undetectable viral loads in the past. Last ID note found was from 1/2023 and he did not have insurance so had to defer management until insurance obtained again. Patient reported taking his Biktarvy, however recent CD4 535, viral load 19,900 which raises concern for intermittently non-compliant vs unable to obtain his medications (however reports he is getting medications through a provider at [facility]). Needs to re-establish ID for follow up however apparently unable to accept patient at [facility] due to too many no  "shows. Was discharged with 30 day supply of Biktary during admission in 8/2023.   - Continue Biktarvy   -Asked the patient to make an appointment with the ID clinic at [place] since he feels that he continues to follow-up with them but Will likely need to send with additional supply and potentially need to refer outside our system for ID management     Housing instability  Staying at [facility].   - SW consulted for dispo planning     IV methamphetamine use   Reported ongoing occasional IV meth use during prior admission and cessation was discussed.  The patient would not utilize IV meth for the last 3 months.  The patient declines addiction medicine consult.         Diet: Advance Diet as Tolerated: Full Liquid Diet; Regular Diet Adult    DVT Prophylaxis: Pneumatic Compression Devices  Potts Catheter: Not present  Lines: None     Cardiac Monitoring: None  Code Status: Full Code      Clinically Significant Risk Factors           # Hypercalcemia: corrected calcium is >10.1, will monitor as appropriate    # Hypoalbuminemia: Lowest albumin = 3 g/dL at 9/21/2023  6:55 AM, will monitor as appropriate           # DMII: A1C = 13.3 % (Ref range: <5.7 %) within past 6 months  , PRESENT ON ADMISSION    # Overweight: Estimated body mass index is 26.63 kg/m  as calculated from the following:    Height as of this encounter: 1.651 m (5' 5\").    Weight as of this encounter: 72.6 kg (160 lb)., PRESENT ON ADMISSION            Disposition Plan      Expected Discharge Date: 09/22/2023,  9:00 AM    Destination: shelter  Discharge Comments: if no significant abdominal pain, no or minimal use of IV narcotics, and able to tolerate diet          Norman Castillo MD  Hospitalist Service, GOLD TEAM [address] [place] [address]  Securely message with [email or web address] (more info)  Text page via [facility] Paging/Directory   See signed in provider for up to date coverage " information  ______________________________________________________________________    Interval History   The patient reported improvement in abdominal pain.  However he continues to have some minimal abdominal pain with no radiation.  No reported nausea or vomiting.    Physical Exam   Vital Signs: Temp: 98.7  F (37.1  C) Temp src: Oral BP: 110/62 Pulse: 103   Resp: 18 SpO2: 97 % O2 Device: None (Room air)    Weight: 160 lbs 0 oz    Constitutional: Awake, alert, cooperative, no apparent distress.  Eyes: Conjunctiva and pupils examined and normal.  HEENT: Moist mucous membranes, normal dentition.  Respiratory: Clear to auscultation bilaterally, no crackles or wheezing.  Cardiovascular: Regular rate and rhythm, normal S1 and S2, and no murmur noted.  GI: Soft, non-distended, non-tender, normal bowel sounds.  Lymph/Hematologic: No anterior cervical or supraclavicular adenopathy.  Skin: No rashes, no cyanosis, no edema.  Musculoskeletal: No joint swelling, erythema or tenderness.  Neurologic: Cranial nerves 2-12 intact, normal strength and sensation.  Psychiatric: Alert, oriented to person, place and time, no obvious anxiety or depression.     Medical Decision Making       45 MINUTES SPENT BY ME on the date of service doing chart review, history, exam, documentation & further activities per the note.      Data     I have personally reviewed the following data over the past 24 hrs:    7.4  \   11.9 (L)   / 334     131 (L) 98 9.0 /  246 (H)   3.8 25 0.69 \     ALT: 7 AST: 12 AP: 103 TBILI: 0.5   ALB: 3.0 (L) TOT PROTEIN: 6.0 (L) LIPASE: N/A       Imaging results reviewed over the past 24 hrs:   Recent Results (from the past 24 hour(s))   XR Gastrografin Challenge    Narrative    Examination: Gastrografin challenge 9/20/2023 6:49 PM.    History: Small bowel obstruction    Comparison: X-ray and CT 9/19/2023    Technique: 120 mL Gastrografin was given by mouth.    Findings: Gastric tube with tip and sidehole projecting in  the  stomach. Gastrografin is visualized throughout the colon. Prominent  gas-filled small bowel in the left abdomen measuring up to 4.4 cm. No  appreciable pneumatosis      Impression    Impression: Negative Gastrografin challenge with Gastrografin  visualized throughout the colon.    I have personally reviewed the examination and initial interpretation  and I agree with the findings.    MATIAS LIZ MD         SYSTEM ID:  [id number]

## 2023-09-22 LAB
ALBUMIN SERPL BCG-MCNC: 3.2 G/DL (ref 3.5–5.2)
ALP SERPL-CCNC: 140 U/L (ref 40–129)
ALT SERPL W P-5'-P-CCNC: 8 U/L (ref 0–70)
ANION GAP SERPL CALCULATED.3IONS-SCNC: 12 MMOL/L (ref 7–15)
AST SERPL W P-5'-P-CCNC: 11 U/L (ref 0–45)
BILIRUB SERPL-MCNC: 0.4 MG/DL
BUN SERPL-MCNC: 8 MG/DL (ref 8–23)
CALCIUM SERPL-MCNC: 8.6 MG/DL (ref 8.6–10)
CHLORIDE SERPL-SCNC: 100 MMOL/L (ref 98–107)
CREAT SERPL-MCNC: 0.72 MG/DL (ref 0.67–1.17)
DEPRECATED HCO3 PLAS-SCNC: 24 MMOL/L (ref 22–29)
EGFRCR SERPLBLD CKD-EPI 2021: >90 ML/MIN/1.73M2
ERYTHROCYTE [DISTWIDTH] IN BLOOD BY AUTOMATED COUNT: 13.2 % (ref 10–15)
GLUCOSE BLDC GLUCOMTR-MCNC: 265 MG/DL (ref 70–99)
GLUCOSE BLDC GLUCOMTR-MCNC: 375 MG/DL (ref 70–99)
GLUCOSE BLDC GLUCOMTR-MCNC: 378 MG/DL (ref 70–99)
GLUCOSE BLDC GLUCOMTR-MCNC: 435 MG/DL (ref 70–99)
GLUCOSE BLDC GLUCOMTR-MCNC: 468 MG/DL (ref 70–99)
GLUCOSE BLDC GLUCOMTR-MCNC: 484 MG/DL (ref 70–99)
GLUCOSE SERPL-MCNC: 448 MG/DL (ref 70–99)
HCT VFR BLD AUTO: 34.9 % (ref 40–53)
HGB BLD-MCNC: 11.5 G/DL (ref 13.3–17.7)
MAGNESIUM SERPL-MCNC: 1.8 MG/DL (ref 1.7–2.3)
MCH RBC QN AUTO: 28.3 PG (ref 26.5–33)
MCHC RBC AUTO-ENTMCNC: 33 G/DL (ref 31.5–36.5)
MCV RBC AUTO: 86 FL (ref 78–100)
PHOSPHATE SERPL-MCNC: 3.3 MG/DL (ref 2.5–4.5)
PLATELET # BLD AUTO: 323 10E3/UL (ref 150–450)
POTASSIUM SERPL-SCNC: 3.8 MMOL/L (ref 3.4–5.3)
POTASSIUM SERPL-SCNC: 3.8 MMOL/L (ref 3.4–5.3)
PROT SERPL-MCNC: 6.2 G/DL (ref 6.4–8.3)
RBC # BLD AUTO: 4.06 10E6/UL (ref 4.4–5.9)
SODIUM SERPL-SCNC: 136 MMOL/L (ref 136–145)
WBC # BLD AUTO: 7.6 10E3/UL (ref 4–11)

## 2023-09-22 PROCEDURE — 250N000013 HC RX MED GY IP 250 OP 250 PS 637: Performed by: INTERNAL MEDICINE

## 2023-09-22 PROCEDURE — 80053 COMPREHEN METABOLIC PANEL: CPT | Performed by: INTERNAL MEDICINE

## 2023-09-22 PROCEDURE — 250N000013 HC RX MED GY IP 250 OP 250 PS 637: Performed by: PHYSICIAN ASSISTANT

## 2023-09-22 PROCEDURE — 36415 COLL VENOUS BLD VENIPUNCTURE: CPT | Performed by: PHYSICIAN ASSISTANT

## 2023-09-22 PROCEDURE — 120N000002 HC R&B MED SURG/OB UMMC

## 2023-09-22 PROCEDURE — 99232 SBSQ HOSP IP/OBS MODERATE 35: CPT | Performed by: INTERNAL MEDICINE

## 2023-09-22 PROCEDURE — 80053 COMPREHEN METABOLIC PANEL: CPT | Performed by: PHYSICIAN ASSISTANT

## 2023-09-22 PROCEDURE — 85027 COMPLETE CBC AUTOMATED: CPT | Performed by: PHYSICIAN ASSISTANT

## 2023-09-22 PROCEDURE — 84100 ASSAY OF PHOSPHORUS: CPT | Performed by: PHYSICIAN ASSISTANT

## 2023-09-22 PROCEDURE — 83735 ASSAY OF MAGNESIUM: CPT | Performed by: PHYSICIAN ASSISTANT

## 2023-09-22 RX ORDER — MAGNESIUM OXIDE 400 MG/1
400 TABLET ORAL EVERY 4 HOURS
Status: COMPLETED | OUTPATIENT
Start: 2023-09-22 | End: 2023-09-22

## 2023-09-22 RX ORDER — POLYETHYLENE GLYCOL 3350 17 G/17G
17 POWDER, FOR SOLUTION ORAL 3 TIMES DAILY
Status: DISCONTINUED | OUTPATIENT
Start: 2023-09-22 | End: 2023-09-26 | Stop reason: HOSPADM

## 2023-09-22 RX ORDER — POTASSIUM CHLORIDE 750 MG/1
20 TABLET, EXTENDED RELEASE ORAL ONCE
Status: COMPLETED | OUTPATIENT
Start: 2023-09-22 | End: 2023-09-22

## 2023-09-22 RX ORDER — AMOXICILLIN 250 MG
2 CAPSULE ORAL 2 TIMES DAILY
Status: DISCONTINUED | OUTPATIENT
Start: 2023-09-22 | End: 2023-09-26 | Stop reason: HOSPADM

## 2023-09-22 RX ADMIN — POLYETHYLENE GLYCOL 3350 17 G: 17 POWDER, FOR SOLUTION ORAL at 13:58

## 2023-09-22 RX ADMIN — BUPROPION HYDROCHLORIDE 150 MG: 150 TABLET, FILM COATED, EXTENDED RELEASE ORAL at 08:26

## 2023-09-22 RX ADMIN — MUPIROCIN: 20 OINTMENT TOPICAL at 20:00

## 2023-09-22 RX ADMIN — FAMOTIDINE 40 MG: 20 TABLET ORAL at 20:00

## 2023-09-22 RX ADMIN — FAMOTIDINE 40 MG: 20 TABLET ORAL at 08:26

## 2023-09-22 RX ADMIN — SENNOSIDES AND DOCUSATE SODIUM 2 TABLET: 50; 8.6 TABLET ORAL at 20:00

## 2023-09-22 RX ADMIN — POTASSIUM CHLORIDE 20 MEQ: 750 TABLET, EXTENDED RELEASE ORAL at 11:21

## 2023-09-22 RX ADMIN — INSULIN GLARGINE 20 UNITS: 100 INJECTION, SOLUTION SUBCUTANEOUS at 08:29

## 2023-09-22 RX ADMIN — TAMSULOSIN HYDROCHLORIDE 0.4 MG: 0.4 CAPSULE ORAL at 08:26

## 2023-09-22 RX ADMIN — SENNOSIDES AND DOCUSATE SODIUM 2 TABLET: 50; 8.6 TABLET ORAL at 13:58

## 2023-09-22 RX ADMIN — MUPIROCIN: 20 OINTMENT TOPICAL at 13:58

## 2023-09-22 RX ADMIN — SITAGLIPTIN 100 MG: 100 TABLET, FILM COATED ORAL at 08:26

## 2023-09-22 RX ADMIN — MAGNESIUM OXIDE TAB 400 MG (241.3 MG ELEMENTAL MG) 400 MG: 400 (241.3 MG) TAB at 11:21

## 2023-09-22 RX ADMIN — BICTEGRAVIR SODIUM, EMTRICITABINE, AND TENOFOVIR ALAFENAMIDE FUMARATE 1 TABLET: 50; 200; 25 TABLET ORAL at 08:26

## 2023-09-22 RX ADMIN — MUPIROCIN: 20 OINTMENT TOPICAL at 08:32

## 2023-09-22 RX ADMIN — POLYETHYLENE GLYCOL 3350 17 G: 17 POWDER, FOR SOLUTION ORAL at 08:26

## 2023-09-22 RX ADMIN — LIDOCAINE 2 PATCH: 4 PATCH TOPICAL at 08:27

## 2023-09-22 RX ADMIN — POLYETHYLENE GLYCOL 3350 17 G: 17 POWDER, FOR SOLUTION ORAL at 19:59

## 2023-09-22 RX ADMIN — MAGNESIUM OXIDE TAB 400 MG (241.3 MG ELEMENTAL MG) 400 MG: 400 (241.3 MG) TAB at 13:58

## 2023-09-22 ASSESSMENT — ACTIVITIES OF DAILY LIVING (ADL)
ADLS_ACUITY_SCORE: 20

## 2023-09-22 NOTE — PLAN OF CARE
Goal Outcome Evaluation:     (7 AM insulin given - 6 U), 378 (pt ate vanilla and gram crackers without staff knowledge prior to breakfast & post insulin correction dose), 265 (3 U given). Pt continues to snack on vanilla pudding and gram crackers between meals. Regular diet changed to Moderate consistent carb diet.  New open sore posterior neck this AM, ointment applied, MD aware - may be diabetes related. Open sores on L lower abd and posterior R thigh - cleansed, ointment applied, dressing changed.  Potassium and Magnesium replaced. Recheck tomorrow AM. MD OK'ed damion replacement, known to may be decrease biktarvy therapeutic effects.  3/10 pain in calves, lidocaine patches applied. Ambulates hallways. No BM as of yet, miralax and senna given. Voids spontaneously. Refuses bladder scans.

## 2023-09-22 NOTE — PROGRESS NOTES
[facility]    Medicine Progress Note - Hospitalist Service, GOLD TEAM 8    Date of Admission:  9/19/2023    Assessment & Plan   57 year old male admitted on 9/19/2023. 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 He presented to the ED with 1 day of obstipation and vomiting concerning for recurrent SBO.     # Vomiting and abdominal pain secondary to recurrent small bowel obstruction likely secondary to prior adhesions, all are present on admission, resolved  Noting day of diarrhea followed by no flatus or BM with associated vomiting and abdominal distention. Mild leukocytosis and thromocytosis are likely stress reaction.  CT with focal area of small bowel dilation in LLQ with multiple points of transition suggestive of SBO secondary to adhesions.  Try to minimize narcotics to help with medical resolution of the obstruction.    Abdominal pain improved.  Gastrografin study was performed which was within normal limits on 9/22.  The patient nasogastric tube was removed accidentally however the patient was able to tolerate clear liquid diet after that as such it was not placed again, appropriately so.    At this point, the patient reported pain is likely secondary to constipation.  The patient was seen by general surgery for follow-up and they signed off.    -Continue Zofran and Compazine PRN  -Continue lidocaine patch at left lower quadrant to minimize utilization of narcotics  -Continue ketorolac intravenous at 15 mg every 6 hours again to minimize utilization of narcotics  -Reduced the frequency of oxycodone to q6h for moderate pain and intravenous dilaudid to bid prn  -Cotinine regular (mod carb) diet  -Increase the frequency of MiraLAX to 3 times daily and started senna S twice daily  -Appreciate  the input of general surgery    # Noncellulitic small skin lesion at lower abdomen and back of the neck, all are present on admission trace erythema surrounding lesion without warmth, pain or fluctuance. Reportedly started as an ingrown hair. Currently does not seem c/w with cellulitis but high risk given his immunosuppression/HIV status.  This is likely secondary to itching in the setting of hyperglycemia  - Topical mupirocin TID x 5 days      # Distended urinary bladder likely secondary to BPH with penile prosthesis having air in the reservoir  Noted to have well distended bladder with wall thickening on CT s/o chronic obstruction. Enlarged prostate noted on prior imaging.  No significant urinary retention reported  - I/Os, bladder scans   -Continiue tamsulosin  -Requested evaluation by urology saw the patient and recommended referral to outpatient.    #Chronic medical problems  Controlled hyperglycemia secondary to DM2, all are present on admission  Recent Hgb A1c 13.3%. Endocrine consulted during prior admission and started on lantus 38 units daily in addition to januvia 100 mg daily and glimepiride 8 mg daily (4 mg daily on days when intake/food access lower) Reports taking only 27 units lantus in addition to short acting insulin at home as well but not on his med list.   -Resume Lantus at half dose of 20 units given resumption of oral intake  -Resume januvia  -Holding glimepiride while inpatient  - MSSI and BG checks q4h  - Hypoglycemia protocol     HIV, YOUSIF.  Unclear who is managing his HIV. Had multiple undetectable viral loads in the past. Last ID note found was from 1/2023 and he did not have insurance so had to defer management until insurance obtained again. Patient reported taking his Biktarvy, however recent CD4 535, viral load 19,900 which raises concern for intermittently non-compliant vs unable to obtain his medications (however reports he is getting medications through a provider at [facility]  "clinic). Needs to re-establish ID for follow up however apparently unable to accept patient at [facility] due to too many no shows. Was discharged with 30 day supply of Biktary during admission in 8/2023.   - Continue Biktarvy   -Asked the patient to make an appointment with the ID clinic at [place] since he feels that he continues to follow-up with them but Will likely need to send with additional supply and potentially need to refer outside our system for ID management     Housing instability  Staying at [facility].   - SW consulted for dispo planning     IV methamphetamine use   Reported ongoing occasional IV meth use during prior admission and cessation was discussed.  The patient would not utilize IV meth for the last 3 months.  The patient declines addiction medicine consult.         Diet: Advance Diet as Tolerated: Regular Diet Adult; Regular Diet Adult    DVT Prophylaxis: Pneumatic Compression Devices  Potts Catheter: Not present  Lines: None     Cardiac Monitoring: None  Code Status: Full Code      Clinically Significant Risk Factors              # Hypoalbuminemia: Lowest albumin = 3 g/dL at 9/21/2023  6:55 AM, will monitor as appropriate           # DMII: A1C = 13.3 % (Ref range: <5.7 %) within past 6 months  , PRESENT ON ADMISSION    # Overweight: Estimated body mass index is 26.63 kg/m  as calculated from the following:    Height as of this encounter: 1.651 m (5' 5\").    Weight as of this encounter: 72.6 kg (160 lb)., PRESENT ON ADMISSION            Disposition Plan      Expected Discharge Date: 09/23/2023      Destination: shelter  Discharge Comments: if no significant abdominal pain, no or minimal use of IV narcotics, and able to tolerate diet          Norman Castillo MD  Hospitalist Service, [address]  Securely message with [employer] (more info)  Text page via [facility] Paging/Directory   See signed in provider for up to date coverage " information  ______________________________________________________________________    Interval History   The patient reported continued improvement in abdominal pain.  However he continues to have some minimal abdominal pain with no radiation.  No reported nausea or vomiting.    Physical Exam   Vital Signs: Temp: 98.6  F (37  C) Temp src: Oral BP: 126/71 Pulse: 101   Resp: 17 SpO2: 99 % O2 Device: None (Room air)    Weight: 160 lbs 0 oz    Constitutional: Awake, alert, cooperative, no apparent distress.  Eyes: Conjunctiva and pupils examined and normal.  HEENT: Moist mucous membranes, normal dentition.  Respiratory: Clear to auscultation bilaterally, no crackles or wheezing.  Cardiovascular: Regular rate and rhythm, normal S1 and S2, and no murmur noted.  GI: Soft, non-distended, non-tender, normal bowel sounds.  Lymph/Hematologic: No anterior cervical or supraclavicular adenopathy.  Skin: No rashes, no cyanosis, no edema.  Musculoskeletal: No joint swelling, erythema or tenderness.  Neurologic: Cranial nerves 2-12 intact, normal strength and sensation.  Psychiatric: Alert, oriented to person, place and time, no obvious anxiety or depression.     Medical Decision Making       45 MINUTES SPENT BY ME on the date of service doing chart review, history, exam, documentation & further activities per the note.      Data     I have personally reviewed the following data over the past 24 hrs:    7.6  \   11.5 (L)   / 323     136 100 8.0 /  378 (H)   3.8; 3.8 24 0.72 \     ALT: 8 AST: 11 AP: 140 (H) TBILI: 0.4   ALB: 3.2 (L) TOT PROTEIN: 6.2 (L) LIPASE: N/A       Imaging results reviewed over the past 24 hrs:   No results found for this or any previous visit (from the past 24 hour(s)).

## 2023-09-22 NOTE — PROGRESS NOTES
Spoke with RN, Bacitracin currently in use for generalized small open lesions. Consulted for abdominal wall wound. Surgery has been consulted as well. Will allow their team to manage. Please re-consult WOC team for any new skin issues.

## 2023-09-22 NOTE — PROGRESS NOTES
"  VS: /66 (BP Location: Right arm)   Pulse 103   Temp 98.9  F (37.2  C) (Oral)   Resp 18   Ht 1.651 m (5' 5\")   Wt 72.6 kg (160 lb)   SpO2 98%   BMI 26.63 kg/m      Neuros: A&OX4  Cardiac: WNL  Respiratory: WNL pt RA  GI/: patient reporting voiding multiple times during the night, declined bladder scan  Diet/Nausea: pt denies nausea, reg    Skin: bruises to lower abd    LDA: piv SL   Pain: denies pain   Activity: SBA/ind  Plan: continues plan of care      "

## 2023-09-22 NOTE — PLAN OF CARE
"Goal Outcome Evaluation: Pt alert and oriented x4. C/o abdominal pain and nausea that worsened after eating regular diet for dinner. PRN toradol and zofran administered with adequate effect. Denied having BM this evening. Stated he felt \"bloated.\" Pt noted to be eating snacks throughout the evening. Blood glucose >400. Voiding spontaneously. Not saving urine for measurement. Declined bladder scan. Continue to assess GI status. Assess pain and effectiveness of interventions. Assess toleration to diet. Monitor blood glucose.       Plan of Care Reviewed With: patient    Overall Patient Progress: no changeOverall Patient Progress: no change    Outcome Evaluation: Abdominal pain and nausea worse after eating regular diet for dinner.      "

## 2023-09-22 NOTE — TELEPHONE ENCOUNTER
REFERRAL INFORMATION:  Referring Provider: Dr. Farhana Yancey  Referring Clinic: [facility] - Admission  Reason for Visit/Diagnosis: Frequent SBO, hospital f/u       FUTURE VISIT INFORMATION:  Appointment Date: 10/11/2023  Appointment Time: 9:30 AM     NOTES RECORD STATUS  DETAILS   OFFICE NOTE from Referring Provider N/A    OFFICE NOTE from Other Specialists [facility]:  1/25/23 - ID OV with Ashlie Holley   [facility] DISCHARGE SUMMARY/ ED VISITS  Care Everywhere / Internal [facility]:  9/19/23 - Admission with Dr. Castillo  9/12/23 - ED OV with Dr. Ritter  8/6/23 - Admission with Dr. Levy  6/20/23 - ED OV with Dr. Brown  * Additional in Epic    [facility]:  7/3/22 - Admission with Dr. Byrd  2/18/22 - ED OV with Dr. Medeiros  2/5/22 - Admission with Dr. Winston  1/26/22 - ED OV with Dr. Cunha   OPERATIVE REPORT [facility]:  4/16/18 - OP Note for LAPAROSCOPY, LYSIS OF ADHESIONS with Dr. Dowling  1/31/18 - OP Note for LAPAROSCOPIC APPENDECTOMY with Dr. Holt   ENDOSCOPY (EGD)  [facility]:  6/5/22 - EGD   PERTINENT LABS Care Everywhere / Internal    PATHOLOGY REPORTS (RELATED) [facility]:  6/6/22 - Small Bowel (Case: [id number])  1/18/16 - Small Bowel (Case: )   IMAGING (CT, MRI, US, XR)  Received / [facility]:  9/19/23, 8/7/23, 4/19/23, 4/18/23, 3/29/23 - XR Abdomen  9/19/23, 9/12/23, 8/6/23, 6/21/23, 5/6/23, 4/16/23 - CT Abd/Pelvis  5/6/23 - XR Chest  * Additional in Epic/PACs    [facility]:  7/5/22, 7/4/22, 7/3/22, 2/6/22, 2/5/22, 1/26/22 - XR Abdomen  7/3/22, 2/18/22, 2/5/22 - CT Abd/Pelvis     Records Requested    Facility  [facility]  Fax: [phone]   Outcome * 9/22/23 11:44 AM Faxed urg req to [facility] for images to be pushed to [facility] PACs. - Sharlene    * 9/29/23 7:50 AM Images received from [facility] and attached to the patient in PACs. - Sharlene

## 2023-09-23 ENCOUNTER — APPOINTMENT (OUTPATIENT)
Dept: GENERAL RADIOLOGY | Facility: CLINIC | Age: 58
End: 2023-09-23
Attending: INTERNAL MEDICINE
Payer: COMMERCIAL

## 2023-09-23 LAB
GLUCOSE BLDC GLUCOMTR-MCNC: 369 MG/DL (ref 70–99)
GLUCOSE BLDC GLUCOMTR-MCNC: 374 MG/DL (ref 70–99)
GLUCOSE BLDC GLUCOMTR-MCNC: 378 MG/DL (ref 70–99)
GLUCOSE BLDC GLUCOMTR-MCNC: 398 MG/DL (ref 70–99)
GLUCOSE BLDC GLUCOMTR-MCNC: 403 MG/DL (ref 70–99)
GLUCOSE BLDC GLUCOMTR-MCNC: 444 MG/DL (ref 70–99)
GLUCOSE BLDC GLUCOMTR-MCNC: 463 MG/DL (ref 70–99)
GLUCOSE BLDC GLUCOMTR-MCNC: 582 MG/DL (ref 70–99)

## 2023-09-23 PROCEDURE — 250N000009 HC RX 250: Performed by: PHYSICIAN ASSISTANT

## 2023-09-23 PROCEDURE — 99232 SBSQ HOSP IP/OBS MODERATE 35: CPT | Performed by: INTERNAL MEDICINE

## 2023-09-23 PROCEDURE — 250N000013 HC RX MED GY IP 250 OP 250 PS 637: Performed by: PHYSICIAN ASSISTANT

## 2023-09-23 PROCEDURE — 250N000013 HC RX MED GY IP 250 OP 250 PS 637: Performed by: INTERNAL MEDICINE

## 2023-09-23 PROCEDURE — 120N000002 HC R&B MED SURG/OB UMMC

## 2023-09-23 PROCEDURE — 999N000128 HC STATISTIC PERIPHERAL IV START W/O US GUIDANCE

## 2023-09-23 PROCEDURE — 250N000011 HC RX IP 250 OP 636: Mod: JZ | Performed by: PHYSICIAN ASSISTANT

## 2023-09-23 PROCEDURE — 74018 RADEX ABDOMEN 1 VIEW: CPT

## 2023-09-23 PROCEDURE — 74018 RADEX ABDOMEN 1 VIEW: CPT | Mod: 26 | Performed by: STUDENT IN AN ORGANIZED HEALTH CARE EDUCATION/TRAINING PROGRAM

## 2023-09-23 PROCEDURE — 250N000011 HC RX IP 250 OP 636: Mod: JZ | Performed by: INTERNAL MEDICINE

## 2023-09-23 RX ORDER — NALOXONE HYDROCHLORIDE 0.4 MG/ML
0.4 INJECTION, SOLUTION INTRAMUSCULAR; INTRAVENOUS; SUBCUTANEOUS
Status: DISCONTINUED | OUTPATIENT
Start: 2023-09-23 | End: 2023-09-26 | Stop reason: HOSPADM

## 2023-09-23 RX ORDER — NALOXONE HYDROCHLORIDE 0.4 MG/ML
0.2 INJECTION, SOLUTION INTRAMUSCULAR; INTRAVENOUS; SUBCUTANEOUS
Status: DISCONTINUED | OUTPATIENT
Start: 2023-09-23 | End: 2023-09-26 | Stop reason: HOSPADM

## 2023-09-23 RX ORDER — NICOTINE POLACRILEX 4 MG
15-30 LOZENGE BUCCAL
Status: DISCONTINUED | OUTPATIENT
Start: 2023-09-23 | End: 2023-09-26 | Stop reason: HOSPADM

## 2023-09-23 RX ORDER — DEXTROSE MONOHYDRATE 25 G/50ML
25-50 INJECTION, SOLUTION INTRAVENOUS
Status: DISCONTINUED | OUTPATIENT
Start: 2023-09-23 | End: 2023-09-26 | Stop reason: HOSPADM

## 2023-09-23 RX ORDER — OXYCODONE HYDROCHLORIDE 5 MG/1
5 TABLET ORAL EVERY 4 HOURS PRN
Status: DISCONTINUED | OUTPATIENT
Start: 2023-09-23 | End: 2023-09-26 | Stop reason: HOSPADM

## 2023-09-23 RX ORDER — SIMETHICONE 80 MG
80 TABLET,CHEWABLE ORAL 4 TIMES DAILY
Status: DISCONTINUED | OUTPATIENT
Start: 2023-09-23 | End: 2023-09-26 | Stop reason: HOSPADM

## 2023-09-23 RX ORDER — HYDROMORPHONE HYDROCHLORIDE 1 MG/ML
0.3 INJECTION, SOLUTION INTRAMUSCULAR; INTRAVENOUS; SUBCUTANEOUS EVERY 4 HOURS PRN
Status: DISCONTINUED | OUTPATIENT
Start: 2023-09-23 | End: 2023-09-25

## 2023-09-23 RX ORDER — OXYCODONE HYDROCHLORIDE 5 MG/1
5 TABLET ORAL 2 TIMES DAILY PRN
Status: DISCONTINUED | OUTPATIENT
Start: 2023-09-23 | End: 2023-09-23

## 2023-09-23 RX ORDER — DEXTROSE MONOHYDRATE 100 MG/ML
INJECTION, SOLUTION INTRAVENOUS CONTINUOUS PRN
Status: DISCONTINUED | OUTPATIENT
Start: 2023-09-23 | End: 2023-09-26 | Stop reason: HOSPADM

## 2023-09-23 RX ORDER — KETOROLAC TROMETHAMINE 15 MG/ML
15 INJECTION, SOLUTION INTRAMUSCULAR; INTRAVENOUS EVERY 6 HOURS PRN
Status: ACTIVE | OUTPATIENT
Start: 2023-09-20 | End: 2023-09-24

## 2023-09-23 RX ADMIN — ONDANSETRON 4 MG: 2 INJECTION INTRAMUSCULAR; INTRAVENOUS at 08:54

## 2023-09-23 RX ADMIN — INSULIN HUMAN 5.5 UNITS/HR: 1 INJECTION, SOLUTION INTRAVENOUS at 19:53

## 2023-09-23 RX ADMIN — FAMOTIDINE 40 MG: 20 TABLET ORAL at 21:03

## 2023-09-23 RX ADMIN — BICTEGRAVIR SODIUM, EMTRICITABINE, AND TENOFOVIR ALAFENAMIDE FUMARATE 1 TABLET: 50; 200; 25 TABLET ORAL at 09:11

## 2023-09-23 RX ADMIN — KETOROLAC TROMETHAMINE 15 MG: 15 INJECTION, SOLUTION INTRAMUSCULAR; INTRAVENOUS at 02:04

## 2023-09-23 RX ADMIN — BUPROPION HYDROCHLORIDE 150 MG: 150 TABLET, FILM COATED, EXTENDED RELEASE ORAL at 09:11

## 2023-09-23 RX ADMIN — SITAGLIPTIN 100 MG: 100 TABLET, FILM COATED ORAL at 09:12

## 2023-09-23 RX ADMIN — KETOROLAC TROMETHAMINE 15 MG: 15 INJECTION, SOLUTION INTRAMUSCULAR; INTRAVENOUS at 09:02

## 2023-09-23 RX ADMIN — INSULIN HUMAN 5.5 UNITS/HR: 1 INJECTION, SOLUTION INTRAVENOUS at 22:25

## 2023-09-23 RX ADMIN — INSULIN HUMAN 5.5 UNITS/HR: 1 INJECTION, SOLUTION INTRAVENOUS at 21:02

## 2023-09-23 RX ADMIN — MUPIROCIN: 20 OINTMENT TOPICAL at 09:14

## 2023-09-23 RX ADMIN — TAMSULOSIN HYDROCHLORIDE 0.4 MG: 0.4 CAPSULE ORAL at 09:11

## 2023-09-23 RX ADMIN — SIMETHICONE 80 MG: 80 TABLET, CHEWABLE ORAL at 21:03

## 2023-09-23 RX ADMIN — FAMOTIDINE 40 MG: 20 TABLET ORAL at 09:11

## 2023-09-23 RX ADMIN — SIMETHICONE 80 MG: 80 TABLET, CHEWABLE ORAL at 17:36

## 2023-09-23 ASSESSMENT — ACTIVITIES OF DAILY LIVING (ADL)
ADLS_ACUITY_SCORE: 20

## 2023-09-23 NOTE — PROGRESS NOTES
Brief Medicine Note    Notified by nursing that patient blood glucose 582.  Patient has hx of poorly controlled DM2. Per chart review, patient received lantus 9 Units this morning, with plans to resume home dosing Lantus 38 units tomorrow morning. Per nursing patient has been eating a significant amount of snacks this evening.     Held subcutaneous insulin and starting insulin drip.     Lou Canela [facility] Medicine

## 2023-09-23 NOTE — PLAN OF CARE
Goal Outcome Evaluation:      Plan of Care Reviewed With: patient    Overall Patient Progress: no changeOverall Patient Progress: no change    Outcome Evaluation: No acute changes. Pt c/o leg pain, IV Tordol given with relief. Also c/o discomfort r/t constipation. Independent in room.

## 2023-09-23 NOTE — PLAN OF CARE
Goal Outcome Evaluation:    Pt reported abd pain 10/10. Abd XR completed this afternoon, results pending. Toradol given this AM. Laxative held. Pt stated he felt gassy and passed a lot of gas which help relieved the pain - pt believe it may have been d/t taking in pudding.  Report of nausea, no vomiting. Zofran given. Reports relief.  , 444. Insulin given. Pt snacking on gram crackers and ginger ale all shift.  Lantus changed from 20 U to 9 U.

## 2023-09-23 NOTE — PLAN OF CARE
Goal Outcome Evaluation:       A&O VSS on RA. PIV SL.  at HS (MD aware). Reg diet, good appetite.                   PAST MEDICAL HISTORY:  Bronchitis     Hyperlipidemia

## 2023-09-23 NOTE — PROGRESS NOTES
[facility]    Medicine Progress Note - Hospitalist Service, GOLD TEAM 8    Date of Admission:  9/19/2023    Assessment & Plan   57 year old male admitted on 9/19/2023. 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 He presented to the ED with 1 day of obstipation and vomiting concerning for recurrent SBO.     # Vomiting and abdominal pain secondary to recurrent small bowel obstruction likely secondary to prior adhesions, all are present on admission, resolved  Noting day of diarrhea followed by no flatus or BM with associated vomiting and abdominal distention. Mild leukocytosis and thromocytosis are likely stress reaction.  CT with focal area of small bowel dilation in LLQ with multiple points of transition suggestive of SBO secondary to adhesions.  Try to minimize narcotics to help with medical resolution of the obstruction.    Abdominal pain improved.  Gastrografin study was performed which was within normal limits on 9/22.  The patient nasogastric tube was removed accidentally however the patient was able to tolerate clear liquid diet after that as such it was not placed again, appropriately so.    However, the patient had recurrence of abdominal pain with nausea and one episode of nonbloody vomiting on 9/23.      -Repeat abdominal imaging with abdominal x-ray  -Continue Zofran and Compazine PRN  -Continue lidocaine patch at left lower quadrant to minimize utilization of narcotics  -Continue ketorolac intravenous at 15 mg every 6 hours again to minimize utilization of narcotics  -Resume pain control with oxycodone for moderate pain and intravenous Dilaudid for severe pain  -N.p.o. for now until ensuring that there is no recurrence of small bowel obstruction  -Holding laxatives with MiraLAX and  keegan  -Appreciate the input of general surgery    # Noncellulitic small skin lesion at lower abdomen and back of the neck, all are present on admission trace erythema surrounding lesion without warmth, pain or fluctuance. Reportedly started as an ingrown hair. Currently does not seem c/w with cellulitis but high risk given his immunosuppression/HIV status.  This is likely secondary to itching in the setting of hyperglycemia  - Topical mupirocin TID x 5 days      # Distended urinary bladder likely secondary to BPH with penile prosthesis having air in the reservoir  Noted to have well distended bladder with wall thickening on CT s/o chronic obstruction. Enlarged prostate noted on prior imaging.  No significant urinary retention reported  - I/Os, bladder scans   -Continiue tamsulosin  -Requested evaluation by urology saw the patient and recommended referral to outpatient.    #Chronic medical problems  Controlled hyperglycemia secondary to DM2, all are present on admission  Recent Hgb A1c 13.3%. Endocrine consulted during prior admission and started on lantus 38 units daily in addition to januvia 100 mg daily and glimepiride 8 mg daily (4 mg daily on days when intake/food access lower) Reports taking only 27 units lantus in addition to short acting insulin at home as well but not on his med list.   -Resume Lantus at half dose of 20 units given resumption of oral intake  -Resume januvia  -Holding glimepiride while inpatient  - MSSI and BG checks q4h  - Hypoglycemia protocol     HIV, OLOL.  Unclear who is managing his HIV. Had multiple undetectable viral loads in the past. Last ID note found was from 1/2023 and he did not have insurance so had to defer management until insurance obtained again. Patient reported taking his Biktarvy, however recent CD4 535, viral load 19,900 which raises concern for intermittently non-compliant vs unable to obtain his medications (however reports he is getting medications through a  "provider at [facility]). Needs to re-establish ID for follow up however apparently unable to accept patient at [facility] due to too many no shows. Was discharged with 30 day supply of Biktary during admission in 8/2023.   - Continue Biktarvy   -Asked the patient to make an appointment with the ID clinic at [place] since he feels that he continues to follow-up with them but Will likely need to send with additional supply and potentially need to refer outside our system for ID management     Housing instability  Staying at [facility].   - SW consulted for dispo planning     IV methamphetamine use   Reported ongoing occasional IV meth use during prior admission and cessation was discussed.  The patient would not utilize IV meth for the last 3 months.  The patient declines addiction medicine consult.         Diet: Moderate Consistent Carb (60 g CHO per Meal) Diet    DVT Prophylaxis: Pneumatic Compression Devices  Potts Catheter: Not present  Lines: None     Cardiac Monitoring: None  Code Status: Full Code      Clinically Significant Risk Factors              # Hypoalbuminemia: Lowest albumin = 3 g/dL at 9/21/2023  6:55 AM, will monitor as appropriate           # DMII: A1C = 13.3 % (Ref range: <5.7 %) within past 6 months  , PRESENT ON ADMISSION    # Overweight: Estimated body mass index is 26.63 kg/m  as calculated from the following:    Height as of this encounter: 1.651 m (5' 5\").    Weight as of this encounter: 72.6 kg (160 lb)., PRESENT ON ADMISSION            Disposition Plan      Expected Discharge Date: 09/25/2023      Destination: shelter  Discharge Comments: if no significant abdominal pain, no or minimal use of IV narcotics, and able to tolerate diet          Norman Castillo MD  Hospitalist Service, GOLD TEAM [address] [place] [address]  Securely message with [employer] (more info)  Text page via [facility] Paging/Directory   See signed in provider for up to date coverage " information  ______________________________________________________________________    Interval History   The patient reported worsening abdominal pain.  Reports nausea and one episode of nonbloody vomiting.    Physical Exam   Vital Signs: Temp: 98.6  F (37  C) Temp src: Oral BP: 127/69 Pulse: 82   Resp: 18 SpO2: 98 % O2 Device: None (Room air)    Weight: 160 lbs 0 oz    Constitutional: Awake, alert, cooperative, no apparent distress.  Eyes: Conjunctiva and pupils examined and normal.  HEENT: Moist mucous membranes, normal dentition.  Respiratory: Clear to auscultation bilaterally, no crackles or wheezing.  Cardiovascular: Regular rate and rhythm, normal S1 and S2, and no murmur noted.  GI: Soft, non-distended, non-tender, normal bowel sounds.  Lymph/Hematologic: No anterior cervical or supraclavicular adenopathy.  Skin: No rashes, no cyanosis, no edema.  Musculoskeletal: No joint swelling, erythema or tenderness.  Neurologic: Cranial nerves 2-12 intact, normal strength and sensation.  Psychiatric: Alert, oriented to person, place and time, no obvious anxiety or depression.     Medical Decision Making       45 MINUTES SPENT BY ME on the date of service doing chart review, history, exam, documentation & further activities per the note.      Data         Imaging results reviewed over the past 24 hrs:   No results found for this or any previous visit (from the past 24 hour(s)).

## 2023-09-23 NOTE — PLAN OF CARE
Goal Outcome Evaluation:       A&O VSS on RA. PIV SL. Reg diet, good appetite. Denies pain. Up independently in room.

## 2023-09-24 ENCOUNTER — APPOINTMENT (OUTPATIENT)
Dept: CT IMAGING | Facility: CLINIC | Age: 58
End: 2023-09-24
Attending: INTERNAL MEDICINE
Payer: COMMERCIAL

## 2023-09-24 LAB
ALBUMIN UR-MCNC: 20 MG/DL
APPEARANCE UR: CLEAR
B-OH-BUTYR SERPL-SCNC: <0.18 MMOL/L
BASE EXCESS BLDV CALC-SCNC: 1.9 MMOL/L (ref -7.7–1.9)
BILIRUB UR QL STRIP: NEGATIVE
COLOR UR AUTO: YELLOW
GLUCOSE BLDC GLUCOMTR-MCNC: 171 MG/DL (ref 70–99)
GLUCOSE BLDC GLUCOMTR-MCNC: 187 MG/DL (ref 70–99)
GLUCOSE BLDC GLUCOMTR-MCNC: 204 MG/DL (ref 70–99)
GLUCOSE BLDC GLUCOMTR-MCNC: 206 MG/DL (ref 70–99)
GLUCOSE BLDC GLUCOMTR-MCNC: 214 MG/DL (ref 70–99)
GLUCOSE BLDC GLUCOMTR-MCNC: 218 MG/DL (ref 70–99)
GLUCOSE BLDC GLUCOMTR-MCNC: 235 MG/DL (ref 70–99)
GLUCOSE BLDC GLUCOMTR-MCNC: 236 MG/DL (ref 70–99)
GLUCOSE BLDC GLUCOMTR-MCNC: 251 MG/DL (ref 70–99)
GLUCOSE BLDC GLUCOMTR-MCNC: 260 MG/DL (ref 70–99)
GLUCOSE BLDC GLUCOMTR-MCNC: 357 MG/DL (ref 70–99)
GLUCOSE BLDC GLUCOMTR-MCNC: 366 MG/DL (ref 70–99)
GLUCOSE BLDC GLUCOMTR-MCNC: 433 MG/DL (ref 70–99)
GLUCOSE UR STRIP-MCNC: 300 MG/DL
HCO3 BLDV-SCNC: 27 MMOL/L (ref 21–28)
HGB UR QL STRIP: NEGATIVE
HOLD SPECIMEN: NORMAL
KETONES UR STRIP-MCNC: ABNORMAL MG/DL
LEUKOCYTE ESTERASE UR QL STRIP: NEGATIVE
MUCOUS THREADS #/AREA URNS LPF: PRESENT /LPF
NITRATE UR QL: NEGATIVE
O2/TOTAL GAS SETTING VFR VENT: 23 %
PCO2 BLDV: 44 MM HG (ref 40–50)
PH BLDV: 7.4 [PH] (ref 7.32–7.43)
PH UR STRIP: 5.5 [PH] (ref 5–7)
PO2 BLDV: 54 MM HG (ref 25–47)
RBC URINE: 1 /HPF
SP GR UR STRIP: 1.03 (ref 1–1.03)
SQUAMOUS EPITHELIAL: <1 /HPF
UROBILINOGEN UR STRIP-MCNC: NORMAL MG/DL
WBC URINE: <1 /HPF

## 2023-09-24 PROCEDURE — 82803 BLOOD GASES ANY COMBINATION: CPT | Performed by: INTERNAL MEDICINE

## 2023-09-24 PROCEDURE — 120N000002 HC R&B MED SURG/OB UMMC

## 2023-09-24 PROCEDURE — 74177 CT ABD & PELVIS W/CONTRAST: CPT | Mod: 26 | Performed by: STUDENT IN AN ORGANIZED HEALTH CARE EDUCATION/TRAINING PROGRAM

## 2023-09-24 PROCEDURE — 250N000011 HC RX IP 250 OP 636: Performed by: INTERNAL MEDICINE

## 2023-09-24 PROCEDURE — 250N000009 HC RX 250: Performed by: PHYSICIAN ASSISTANT

## 2023-09-24 PROCEDURE — 99418 PROLNG IP/OBS E/M EA 15 MIN: CPT | Performed by: NURSE PRACTITIONER

## 2023-09-24 PROCEDURE — 74177 CT ABD & PELVIS W/CONTRAST: CPT

## 2023-09-24 PROCEDURE — 250N000013 HC RX MED GY IP 250 OP 250 PS 637: Performed by: INTERNAL MEDICINE

## 2023-09-24 PROCEDURE — 250N000013 HC RX MED GY IP 250 OP 250 PS 637: Performed by: PHYSICIAN ASSISTANT

## 2023-09-24 PROCEDURE — 99233 SBSQ HOSP IP/OBS HIGH 50: CPT | Performed by: INTERNAL MEDICINE

## 2023-09-24 PROCEDURE — 36415 COLL VENOUS BLD VENIPUNCTURE: CPT | Performed by: INTERNAL MEDICINE

## 2023-09-24 PROCEDURE — 99223 1ST HOSP IP/OBS HIGH 75: CPT | Performed by: NURSE PRACTITIONER

## 2023-09-24 PROCEDURE — 250N000011 HC RX IP 250 OP 636: Performed by: PHYSICIAN ASSISTANT

## 2023-09-24 PROCEDURE — 82010 KETONE BODYS QUAN: CPT | Performed by: INTERNAL MEDICINE

## 2023-09-24 PROCEDURE — 81001 URINALYSIS AUTO W/SCOPE: CPT | Performed by: INTERNAL MEDICINE

## 2023-09-24 RX ORDER — IOPAMIDOL 755 MG/ML
99 INJECTION, SOLUTION INTRAVASCULAR ONCE
Status: COMPLETED | OUTPATIENT
Start: 2023-09-24 | End: 2023-09-24

## 2023-09-24 RX ORDER — TAMSULOSIN HYDROCHLORIDE 0.4 MG/1
0.4 CAPSULE ORAL DAILY
Qty: 30 CAPSULE | Refills: 0 | Status: SHIPPED | OUTPATIENT
Start: 2023-09-25 | End: 2023-11-09

## 2023-09-24 RX ORDER — POLYETHYLENE GLYCOL 3350 17 G/17G
17 POWDER, FOR SOLUTION ORAL 2 TIMES DAILY
Qty: 510 G | Refills: 0 | Status: ON HOLD | OUTPATIENT
Start: 2023-09-24 | End: 2023-11-12

## 2023-09-24 RX ORDER — MAGNESIUM HYDROXIDE/ALUMINUM HYDROXICE/SIMETHICONE 120; 1200; 1200 MG/30ML; MG/30ML; MG/30ML
15 SUSPENSION ORAL 3 TIMES DAILY PRN
Qty: 10 ML | Refills: 0 | Status: SHIPPED | OUTPATIENT
Start: 2023-09-24 | End: 2024-07-14

## 2023-09-24 RX ORDER — MUPIROCIN 20 MG/G
OINTMENT TOPICAL 3 TIMES DAILY
Qty: 10 G | Refills: 0 | Status: SHIPPED | OUTPATIENT
Start: 2023-09-24 | End: 2023-11-09

## 2023-09-24 RX ADMIN — FAMOTIDINE 40 MG: 20 TABLET ORAL at 08:49

## 2023-09-24 RX ADMIN — SENNOSIDES AND DOCUSATE SODIUM 2 TABLET: 50; 8.6 TABLET ORAL at 19:56

## 2023-09-24 RX ADMIN — ONDANSETRON 4 MG: 4 TABLET, ORALLY DISINTEGRATING ORAL at 00:12

## 2023-09-24 RX ADMIN — INSULIN ASPART 6 UNITS: 100 INJECTION, SOLUTION INTRAVENOUS; SUBCUTANEOUS at 11:27

## 2023-09-24 RX ADMIN — SITAGLIPTIN 100 MG: 100 TABLET, FILM COATED ORAL at 08:49

## 2023-09-24 RX ADMIN — HYDROMORPHONE HYDROCHLORIDE 0.3 MG: 1 INJECTION, SOLUTION INTRAMUSCULAR; INTRAVENOUS; SUBCUTANEOUS at 00:12

## 2023-09-24 RX ADMIN — BUPROPION HYDROCHLORIDE 150 MG: 150 TABLET, FILM COATED, EXTENDED RELEASE ORAL at 08:49

## 2023-09-24 RX ADMIN — SIMETHICONE 80 MG: 80 TABLET, CHEWABLE ORAL at 19:57

## 2023-09-24 RX ADMIN — INSULIN HUMAN 12 UNITS/HR: 1 INJECTION, SOLUTION INTRAVENOUS at 05:38

## 2023-09-24 RX ADMIN — BICTEGRAVIR SODIUM, EMTRICITABINE, AND TENOFOVIR ALAFENAMIDE FUMARATE 1 TABLET: 50; 200; 25 TABLET ORAL at 08:49

## 2023-09-24 RX ADMIN — SIMETHICONE 80 MG: 80 TABLET, CHEWABLE ORAL at 16:14

## 2023-09-24 RX ADMIN — POLYETHYLENE GLYCOL 3350 17 G: 17 POWDER, FOR SOLUTION ORAL at 19:56

## 2023-09-24 RX ADMIN — SIMETHICONE 80 MG: 80 TABLET, CHEWABLE ORAL at 11:23

## 2023-09-24 RX ADMIN — TAMSULOSIN HYDROCHLORIDE 0.4 MG: 0.4 CAPSULE ORAL at 08:49

## 2023-09-24 RX ADMIN — FAMOTIDINE 40 MG: 20 TABLET ORAL at 19:57

## 2023-09-24 RX ADMIN — MUPIROCIN: 20 OINTMENT TOPICAL at 08:51

## 2023-09-24 RX ADMIN — SIMETHICONE 80 MG: 80 TABLET, CHEWABLE ORAL at 08:49

## 2023-09-24 RX ADMIN — IOPAMIDOL 99 ML: 755 INJECTION, SOLUTION INTRAVENOUS at 14:00

## 2023-09-24 ASSESSMENT — ACTIVITIES OF DAILY LIVING (ADL)
ADLS_ACUITY_SCORE: 20

## 2023-09-24 NOTE — CONSULTS
"New In-Patient Diabetes/Hyperglycemia Management Consult  Andrew Collier  Age: 57 year old  MRN # [id number]   YOB: 1965    Chief Complaint: vomiting, concern for SBO on 9/19 now profoundly hyperglycemic as diet advanced   Reason for consult: uncontrolled hyperglycemia, currently on insulin drip   Consult requested by: Norman Castillo MD     All information gathered via chart review and face-to-face interview and assessment  Additional historian: none   Unique sources of records reviewed:  All records reviewed available via Epic   Professional  utilized --> No    History of Present Illness:  57 year old male admitted on 9/19/2023. 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 He presented to the ED with 1 day of obstipation and vomiting concerning for recurrent SBO.      Diabetes focused:    Andrew Collier is well known to the inpatient diabetes management service from several previous admissions, last seen in August of this year.   During that hospitalization had profound challenges 2/2 constant eating resulting in hyperglycemia and team hoping for plan to control BG in presence of ongoing excessive intake and post prandial BG readings.    Note from 8/15 written by this writer reflecting this \"Extreme escalation on drip up to staggering rate of 22 unit(s) per hour  RN overnight \"gave up\" on covering cho intake as patient was making frequent trips to the regrigerator and there is minimal food left  He is telling staff he is going to eat as much as he can while he is here because he doesn't know what or when he will be able to eat again once leaves.  This has been the key issue while during this in-patient stay - balancing optimization of BG control as it appears here vs actual needs and reality of " "what he will need once outpatient and once again faced with homelessness and food insecurity.  The significant risk of IDS optimizing him here is that his intake will drop off so dramatically that is will not be a safe plan at all.   8:12 AM  RN returns a call and clarifies the drip was actually at 8 unit(s) per hour all night so he has 40 unit(s) of lantus + 8 unit(s) of IV insulin or 192 unit(s) in 24 hours.   Since he will not be leaving with either a full fridge of food nor IV insulin, will calculate a dose based on wt and add 25% based on his needs which will end up being safer than calculating dosing based on actual rates we are seeing\"     Screen shot from 8/15 with Lantus 50 unit(s) + 22 unit(s) of IV insulin - Andrew eating up to any amount of insulin we would escalate to - only did this de-escalate once food limited.            Caveat for this would be then Andrew would discharge to an environment where housing and food would be scarce thus setting up an unsafe situation to titrate up insulin to the inpatient environment only to discharge to quit an opposite scenario.  He was discharged on 8/16/2023 with the following plan with these variables in mind:      \"-blood glucose monitoring three times daily before meals and at bedtime - keep a good journal so when you follow up in the clinic they are able to assist and adjust meds  -Glargine (Lantus, or Basaglar, per insurance coverage) 38 units daily in the morning (0800)              -Januvia 100 mg each day in the AM              -Glimepiride 8 mg - this was started instead of the meal insulin since you don't want to take that many injections AND you have taken it in the past.  Take 8 mg when you are eating well and regularly, take 4 mg when you have less access to food and eating less.               -The Bydureon was not started because of your rashes and hx of rashes from Trulicity in the past that you reported were bothersome               -Close follow up " "in clinic so if you change your mind, any of this can be adjusted for you.               -follow up with [facility] in 1-2 weeks after discharge (appointment request sent to clinic coordinator on 08/15/23)\"      Previous to that was May and at that time the tentative discharge plan was for Lantus 12 units daily (or try metformin XR if pt unwilling to take the Lantus) and Januvia 100 mg daily.  He had trialed 12-14 units glargine (plus prandial aspart) during the hospitalization. Per 5/8/2023, discharge summary, \"Pt was unable to take advantage of obtaining insulin ([employer]) at discharge due to elopement. \"     Per medicine note last evening    \"Brief Medicine Note  Notified by nursing that patient blood glucose 582.  Patient has hx of poorly controlled DM2. Per chart review, patient received lantus 9 Units this morning, with plans to resume home dosing Lantus 38 units tomorrow morning. Per nursing patient has been eating a significant amount of snacks this evening.   Held subcutaneous insulin and starting insulin drip\".      10:18 AM  Spoke with hospitalist this AM - discussed plan together for collaborative decision making for Mr. Lockwood.  Agreed on working toward safe planning for discharge and ruling out concerning issues.     In room numerous pudding containers, > 10 esau crackers wrappers. Piles of food.    Clarified with Andrew when he is out of the hospital, he takes his insulin \"every day in the morning like you told me, 37 unit(s) and take the pill but my blood goes low, like in the 80's and sometimes 70's so give me some sugar pills\".  Takes BG daily, 2-3 times per day.  Now Andrew denies all ROS.   The entire time talking with Andrew, he is eating esau crackers.  He knows he shouldn't but is having a hard time controlling himself.      We review the plan for home and instead of sugar pills to assist with low BG trends, will reduce the insulin plan from 38 unit(s) daily to 32 unit(s).  " "      Last dose insulin PTA:  N/A patient inpatient since 9/19 - currently on IV insulin drip - currently on 10 unit(s) per hour and NO meal coverage ordered.    Inpatient regimen at time of consult:  Ordered and held:  [facility] AC and HS, Lantus 38 unit(s) every AM at 0800   BG at time of consult: 214 mg/dL    Unique/Relevant Labs: most recent from 9/22  Na 136  K 3.8  Bicarb 24  Anion Gap 12  Creat 0.72/GFR >90  Hgb 11.5  Lactic acid 1.6  BhB pending this AM   Osmolality N/A  Covid none   Influenza none  Hgb A1c 13.3%      BG trends:      BG on arrival quite good.  Suspect his outpatient plan is decent - the A1c of 13.3% was prior to the plan that was given to him on last discharge by the IDS   It only until he started to have endless access to food that his BG skyrocketed then overnight when \"fasting\" trends do come down until night of 9/23 when writer suspects as he has done in the past, he eats non-stop as he gets closer to discharge and gets anxious about his food insecurity.          *Anticipate his BG will escalate as he is non-complaint with eating restrictions  However it is not appropriate to maintain on drip or escalate basal while he is overeating only to have him discharge to no food - this set him up for hypoglycemia.   Will transition off drip to his wt based dosing as was done previously AND tighter restrictions  on unit with regard to food.    Tray to desk, 75 g cho per meal restrictions and no extra snacks      Orders Placed This Encounter      Regular Diet Adult    Other Active/Contributory Medical Problems: HIV,   D5W-containing solutions/medications/confounders: no dextrose - excessive eating, no cho coverage ordered   Planned Procedures/surgeries: none  ELOS:  TBD - discharge orders in    Diabetes:  Recent Labs   Lab 09/24/23  0833 09/24/23  0738 09/24/23  0631 09/24/23  0532 09/24/23  0414 09/24/23  0322   * 218* 206* 171* 236* 260*       Diabetes Control:   Lab Results   Component " "Value Date    A1C 13.3 08/06/2023    A1C 11.5 04/18/2023    A1C 12.8 12/07/2022    A1C 12.8 11/17/2022    A1C 11.2 02/18/2022    A1C 9.7 11/11/2020    A1C 8.2 09/25/2020    A1C 8.2 08/27/2020    A1C 11.6 02/11/2020    A1C 12.3 01/07/2020     Diabetes Type: 2 in setting of HIV  DAVID-65 kavya/c-peptide/islet cell kavya - none found in Epic review   Diabetes Duration: 7-8 years  Usual Diabetes Regimen:     Lantus 38 unit(s) - varied compliance    Januvia 100 mg - unk freq of taking     Hx:   In 2021, Andrew reported glargine gave him abdominal pain but that he tolerated detemir.  In May, patient noted lispro insulin gives him a rash at site of injection (and in 2021, he said it caused peripheral edema). Last admission Andrew reported lantus and novolog are okay to give.    Previous admission in August 2023, he told his RN that insulin causes obstructions.  Glimeperide - can't recall taking   (has tried a variety of oral and non-insulin injectables, but did not tolerate them)    Exercise:   Quite active-- walking much of his days since homeless       Diabetes Complications: denies retinopathy, denies peripheral neuropathy, denies nephropathy  Able to Detect Hypoglycemia: yes, has a hard time having ready access to carbohydrates to raise low BG (insurance doesn't cover glucose tabs)  Usual Diabetes Care Provider: [facility]  Factors Impacting Glucose Control: lack of stable housing is main factor    Review of Systems:    CC:  \"nothing now, I feel really good\"  Constitutional:   No fever, no chills  ENT/Mouth:   Hearing at level of conversation, denies hearing changes, no ear pain, no sore throat, no rhinorrhea, no difficulty swallowing  Eyes:  No eye pain, no discharge, no vision changes  CV:   No CP/SOB, no new edema  Resp:  No cough, no wheezing  GI:   Denies nausea, no vomiting, denies constipation, denies diarrhea  :  No dysuria, no frequency, no hematuria  Musk:  No joint swelling/pain, No back " pain  Skin/heme:   No new rashes/bruises/open areas.  No pruritis  Neuro:   No new weakness, no new numbness/tingling, no headache  Psych:   No new anxiety, denies changes/worsening mood concerns  Endocrine:  No polyuria, no polydipsia    Past medical, family and social histories are reviewed and updated.    Past Medical History  Past Medical History:   Diagnosis Date     Acute appendicitis with localized peritonitis 1/31/2018     AIDS (H)      Allergic rhinitis due to other allergen     DNS     Anal dysplasia      Chronic abdominal pain      CNS toxoplasmosis (H)      COVID-19 1/11/2022     Diabetes type 2, controlled (H)      GERD (gastroesophageal reflux disease)      Herpes zoster 9/23/2016     History of seizure      History of substance abuse (H)      HIV (human immunodeficiency virus infection) (H)      HLD (hyperlipidemia)      Lung nodules      Periungual wart      Pneumonia 1/6/2019     PTSD (post-traumatic stress disorder)      Sleep apnea     doesn't use CPAP       Family History  Family History   Problem Relation Age of Onset     Diabetes Brother      Diabetes Father      Alzheimer Disease Father      Unknown/Adopted Mother      Diabetes Paternal Grandfather      Cancer No family hx of         no skin cancer     Skin Cancer No family hx of         no famiy hx of skin cancer     Glaucoma No family hx of      Macular Degeneration No family hx of        Social History  Social History     Socioeconomic History     Marital status: Single     Spouse name: None     Number of children: None     Years of education: None     Highest education level: None   Occupational History     Occupation:      Comment: 5 years; temp agency     Occupation: Unemployed   Tobacco Use     Smoking status: Some Days     Packs/day: 0.25     Years: 40.00     Pack years: 10.00     Types: Cigarettes     Smokeless tobacco: Former   Substance and Sexual Activity     Alcohol use: No     Alcohol/week: 0.0 standard drinks of  "alcohol     Comment: Last etoh in 2007     Drug use: Not Currently     Types: Marijuana, Methamphetamines     Sexual activity: Not Currently     Partners: Female, Male     Comment: Last sexual activity 2008   Social History Narrative    Born in [facility].  Came to the [place] in 1993.  Last traveled to visit family in 2008.        1/7/2019 - Homeless. Working with a  at [employer] trying to get housing. Sexually active with two partners recently using condoms 100% of the time. Smokes occasionally, not for the last 4 weeks.        1/7/2020 at [facility] since 1/1/2020. 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  Care established with ID and endocrine     Physical Exam:  /63 (BP Location: Left arm)   Pulse 88   Temp 98.4  F (36.9  C) (Oral)   Resp 16   Ht 1.651 m (5' 5\")   Wt 72.6 kg (160 lb)   SpO2 98%   BMI 26.63 kg/m      General:  Pleasant, no acute distress. A lot of snacks piled up on bedside table   HEENT: NC/AT, PER and anicteric, non-injected, oral mucous membranes moist.   Lungs: non-labored,  no cough, no SOB/wheezing  ABD: rounded  Mental status: Alert, oriented x3, communicating clearly, memory intact.  Psych: calm, appropriate mood, congruent affect.    Laboratory  Recent Labs   Lab Test 09/24/23  0833 09/24/23  0738 09/22/23  0659 09/22/23  0626 09/21/23  0804 09/21/23  0655   NA  --   --   --  136  --  131*   POTASSIUM  --   --   --  3.8  3.8  --  3.8   CHLORIDE  --   --   --  100  --  98   CO2  --   --   --  24  --  25   ANIONGAP  --   --   --  12  --  8   * 218*   < > 448*   < > 430*   BUN  --   --   --  8.0  --  9.0   CR  --   --   --  0.72  --  0.69   LINDA  --   --   --  8.6  --  8.4*    < > = values in this interval not displayed.     CBC RESULTS:   Recent Labs   Lab Test 09/22/23  0626   WBC 7.6   RBC 4.06*   HGB 11.5*   HCT 34.9*   MCV 86   MCH 28.3   MCHC 33.0   RDW 13.2          Liver Function Studies -   Recent Labs   Lab Test 09/22/23  0626   PROTTOTAL 6.2*   ALBUMIN " 3.2*   BILITOTAL 0.4   ALKPHOS 140*   AST 11   ALT 8       Active Medications  Current Facility-Administered Medications   Medication     acetaminophen (TYLENOL) tablet 975 mg     alum & mag hydroxide-simethicone (MAALOX) suspension 15 mL     bictegravir-emtricitabine-tenofovir (BIKTARVY) -25 MG per tablet 1 tablet     buPROPion (WELLBUTRIN XL) 24 hr tablet 150 mg     dextrose 10% infusion     glucose gel 15-30 g    Or     dextrose 50 % injection 25-50 mL    Or     glucagon injection 1 mg     famotidine (PEPCID) tablet 40 mg     HYDROmorphone (PF) (DILAUDID) injection 0.3 mg     [Held by provider] insulin aspart (NovoLOG) injection (RAPID ACTING)     [Held by provider] insulin aspart (NovoLOG) injection (RAPID ACTING)     [Held by provider] insulin glargine (LANTUS PEN) injection 38 Units     insulin regular (MYXREDLIN) infusion     ketorolac (TORADOL) injection 15 mg     Lidocaine (LIDOCARE) 4 % Patch 2 patch     lidocaine (LMX4) cream     lidocaine 1 % 0.1-1 mL     mupirocin (BACTROBAN) 2 % ointment     naloxone (NARCAN) injection 0.2 mg    Or     naloxone (NARCAN) injection 0.4 mg    Or     naloxone (NARCAN) injection 0.2 mg    Or     naloxone (NARCAN) injection 0.4 mg     ondansetron (ZOFRAN ODT) ODT tab 4 mg    Or     ondansetron (ZOFRAN) injection 4 mg     oxyCODONE (ROXICODONE) tablet 5 mg     [Held by provider] polyethylene glycol (MIRALAX) Packet 17 g     prochlorperazine (COMPAZINE) injection 5 mg    Or     prochlorperazine (COMPAZINE) tablet 5 mg    Or     prochlorperazine (COMPAZINE) suppository 25 mg     [Held by provider] senna-docusate (SENOKOT-S/PERICOLACE) 8.6-50 MG per tablet 2 tablet     simethicone (MYLICON) chewable tablet 80 mg     sitagliptin (JANUVIA) tablet 100 mg     sodium chloride (PF) 0.9% PF flush 3 mL     sodium chloride (PF) 0.9% PF flush 3 mL     tamsulosin (FLOMAX) capsule 0.4 mg     Current Outpatient Medications   Medication Sig Dispense Refill     alum & mag  hydroxide-simethicone (MAALOX) 200-200-20 MG/5ML SUSP suspension Take 15 mLs by mouth 3 times daily as needed for indigestion 10 mL 0     mupirocin (BACTROBAN) 2 % external ointment Apply topically 3 times daily 10 g 0     polyethylene glycol (MIRALAX) 17 GM/Dose powder Take 17 g by mouth 2 times daily 510 g 0     [START ON 9/25/2023] tamsulosin (FLOMAX) 0.4 MG capsule Take 1 capsule (0.4 mg) by mouth daily 30 capsule 0       Assessment:    1.Type 2 diabetes with varied compliance on medication regimen with sub optimal control; A1c 13.3% (8/6/23)  2.Has hx of mild allergy to GLP-1 (trulicity) rash on chest/arms, legs  3.Profound hyperglycemia from uncontrolled eating in setting of food/housing insecurtiy  4.HIV; on biktarvy  5.Anemia  6.SBO hx  7.Labile BG        Plan:      -  IV insulin non-DKA drip - will plan to transition to Lantus now this AM and trend for PM dose     -  Lantus 38 unit(s) at 1000 AM and 2 hours later, STOP IV insulin    -  Schedule Lantus 30 unit(s) once at 2000 if hyperglycemia persists at levels its been with overeating     -  Novolog meal coverage 1 unit(s) per 6g cho AC meals/snacks. COVER all intake.     -  Novolog high resistance sliding scale insulin TID AC/HS and 0200 to start at 1200     -   BG monitoring per IV insulin drip protocol q1-2 hours then TID AC, HS and 0200    -  PTA meds: already taking his Januvia - if there is an issue with the  work up - will hold/discontinue     -  Hypoglycemia protocol    -  Recommend carb counting protocol - additionally -> Tray to desk, 75 g cho per meal restrictions and no extra snacks    -  Education :  none anticipated    -  Outpatient follow up:  recommend [facility] - has had visit request sent on 8/15    -  Please do not hesitate to contact the team with any questions/concerns.     -  Please notify inpatient diabetes service if changes are planned that will impact glycemia, such as NPO, starting/adjusting steroids, enteral  feeding, parenteral feeding, dextrose fluids or procedures.       -  Thank you for this consult, IDS will follow    VIKASH [facility], BC-ADM  Inpatient Diabetes Management Service  Pager - [phone]  Available on [employer]    To contact Endocrine Diabetes service:   From 7 AM-5 PM: page inpatient diabetes provider who is following the patient that day (see filed or incomplete progress notes/consult notes).  If uncertain of provider assignment: page job code 3 *'s,  [phone] then enter [phone].  For questions or updates from 5PM-7AM: page the diabetes job code for on call fellow: [phone]    Please notify inpatient diabetes service if changes are planned to steroids, nutrition, or if procedures are planned requiring prolonged NPO status.Diabetes Management Team job code: [phone]       I spent a total of 110 minutes on the date of the encounter doing prep/post-work, chart review, history and exam, documentation and further activities per the note including lab review, multidisciplinary communication, counseling the patient and/or coordinating care regarding acute hyper/hypoglycemic management, as well as discharge management and planning/communication.  See note for details.

## 2023-09-24 NOTE — PLAN OF CARE
Goal Outcome Evaluation:       A&O VSS on RA. PIV infusing insulin gtt. Reg diet, good appetite. Ambulates outside. Independent to bathroom.

## 2023-09-24 NOTE — PROGRESS NOTES
[facility]    Medicine Progress Note - Hospitalist Service, GOLD TEAM 8    Date of Admission:  9/19/2023    Assessment & Plan   57 year old male admitted on 9/19/2023. 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 He presented to the ED with 1 day of obstipation and vomiting concerning for recurrent SBO.     # Vomiting and abdominal pain secondary to recurrent small bowel obstruction likely secondary to prior adhesions, all are present on admission, resolved  Noting day of diarrhea followed by no flatus or BM with associated vomiting and abdominal distention. Mild leukocytosis and thromocytosis are likely stress reaction.  CT with focal area of small bowel dilation in LLQ with multiple points of transition suggestive of SBO secondary to adhesions.  Try to minimize narcotics to help with medical resolution of the obstruction.    Abdominal pain improved.  Gastrografin study was performed which was within normal limits on 9/22.  The patient nasogastric tube was removed accidentally however the patient was able to tolerate clear liquid diet after that as such it was not placed again, appropriately so.    However, the patient had recurrence of abdominal pain that is similar to on admission    -Ordered CT abdomen and pelvis with oral and intravenous contrast based on discussion with radiology for better sensitivity than abdominal x-ray  -Continue Zofran and Compazine PRN  -Continue lidocaine patch at left lower quadrant to minimize utilization of narcotics  -Continue ketorolac intravenous at 15 mg every 6 hours again to minimize utilization of narcotics  -Resume pain control with oxycodone for moderate pain and intravenous Dilaudid for severe pain  -Continue diet for now  -Continue laxatives for now    -Appreciate the input of general surgery    Controlled hyperglycemia secondary to DM2, all are present on admission  Recent Hgb A1c 13.3%. Endocrine consulted and are concerned for uptitration of insulin in the inpatient setting given the availability of food that the patient consumes while he is in-house while the patient does not have access to as much fluids as outpatient.  As such, increasing dosing of insulin while in-house can lead to discharge on higher dosing of insulin that can be unsafe for the outpatient using that clinical scenario.  -Counseled on moderation of oral intake  -Insulin glargine 38 units every morning   -Insulin NovoLog high insulin resistance scale  -Continue Januvia 100 mg oral daily    # Distended urinary bladder likely secondary to BPH with penile prosthesis having air in the reservoir  Noted to have well distended bladder with wall thickening on CT s/o chronic obstruction. Enlarged prostate noted on prior imaging.  No significant urinary retention reported.    The patient had recurrence of dysuria noted 9/24.    - I/Os, bladder scans   -CT imaging as detailed above  -Repeat urine analysis with reflex urine culture without evidence of urinary tract infection  -Continiue tamsulosin  -Requested evaluation by urology saw the patient and recommended referral to outpatient.    # Noncellulitic small skin lesion at lower abdomen and back of the neck, all are present on admission trace erythema surrounding lesion without warmth, pain or fluctuance. Reportedly started as an ingrown hair. Currently does not seem c/w with cellulitis but high risk given his immunosuppression/HIV status.  This is likely secondary to itching in the setting of hyperglycemia  - Topical mupirocin TID x 5 days      #Chronic medical problems  HIV, OLOL.  Unclear who is managing his HIV. Had multiple undetectable viral loads in the past. Last ID note found was from 1/2023 and he did not have insurance so had to defer  "management until insurance obtained again. Patient reported taking his Biktarvy, however recent CD4 535, viral load 19,900 which raises concern for intermittently non-compliant vs unable to obtain his medications (however reports he is getting medications through a provider at [facility]). Needs to re-establish ID for follow up however apparently unable to accept patient at [facility] due to too many no shows. Was discharged with 30 day supply of Biktary during admission in 8/2023.   - Continue Biktarvy   -Asked the patient to make an appointment with the ID clinic at [facility] since he feels that he continues to follow-up with them but Will likely need to send with additional supply and potentially need to refer outside our system for ID management     Housing instability  Staying at [facility].   - SW consulted for dispo planning     IV methamphetamine use   Reported ongoing occasional IV meth use during prior admission and cessation was discussed.  The patient would not utilize IV meth for the last 3 months.  The patient declines addiction medicine consult.         Diet: High Consistent Carb (75 g CHO per Meal) Diet    DVT Prophylaxis: Pneumatic Compression Devices  Potts Catheter: Not present  Lines: None     Cardiac Monitoring: None  Code Status: Full Code      Clinically Significant Risk Factors              # Hypoalbuminemia: Lowest albumin = 3 g/dL at 9/21/2023  6:55 AM, will monitor as appropriate           # DMII: A1C = 13.3 % (Ref range: <5.7 %) within past 6 months       # Overweight: Estimated body mass index is 26.63 kg/m  as calculated from the following:    Height as of this encounter: 1.651 m (5' 5\").    Weight as of this encounter: 72.6 kg (160 lb).             Disposition Plan      Expected Discharge Date: 09/25/2023,  9:00 AM    Destination: shelter  Discharge Comments: if no significant abdominal pain, no or minimal use of IV narcotics, and able to tolerate diet          Sameh M. " MD Anna  Hospitalist Service, GOLD TEAM 8  [facility]  Securely message with [employer] (more info)  Text page via [facility] Paging/Directory   See signed in provider for up to date coverage information  ______________________________________________________________________    Interval History   The patient reported diffuse abdominal pain.  No active nausea or vomiting.    Physical Exam   Vital Signs: Temp: 98.4  F (36.9  C) Temp src: Oral BP: 120/63 Pulse: 88   Resp: 16 SpO2: 98 % O2 Device: None (Room air)    Weight: 160 lbs 0 oz    Constitutional: Awake, alert, cooperative, no apparent distress.  Eyes: Conjunctiva and pupils examined and normal.  HEENT: Moist mucous membranes, normal dentition.  Respiratory: Clear to auscultation bilaterally, no crackles or wheezing.  Cardiovascular: Regular rate and rhythm, normal S1 and S2, and no murmur noted.  GI: Soft, non-distended, non-tender, normal bowel sounds.  Lymph/Hematologic: No anterior cervical or supraclavicular adenopathy.  Skin: No rashes, no cyanosis, no edema.  Musculoskeletal: No joint swelling, erythema or tenderness.  Neurologic: Cranial nerves 2-12 intact, normal strength and sensation.  Psychiatric: Alert, oriented to person, place and time, no obvious anxiety or depression.     Medical Decision Making       45 MINUTES SPENT BY ME on the date of service doing chart review, history, exam, documentation & further activities per the note.      Data         Imaging results reviewed over the past 24 hrs:   No results found for this or any previous visit (from the past 24 hour(s)).

## 2023-09-24 NOTE — PLAN OF CARE
Goal Outcome Evaluation:      Plan of Care Reviewed With: patient    Overall Patient Progress: no changeOverall Patient Progress: no change    Outcome Evaluation: A/Ox4. VSS on RA. C/o abd pain, IV dilaudid given x1 with relief. Zofran given x1 for nausea. Up ab lexi. Also c/o prosthetic penile causing burning sensation, would like urology consult. Jonathan nails MD notified. On insulin gtt, alg 4.

## 2023-09-24 NOTE — PLAN OF CARE
Goal Outcome Evaluation:    Pt refused labs this AM. Refused bladder scans this shift. MD stopped by around 1500 and spoke to pt about allowing bladder scans to be completed, pt verbalized he is now ok with bladder scan - passed on to on-coming RN. Insulin drip discontinued, back on novolog and glargine. Pt continues to snack on gram crackers and vanilla ice cream multiple times this shift despite education about reducing snacks to help control BG.  Pt reports burning sensation with urination. MD aware. Urine collected and sent to lab for testing. View Results Review tab for details.  CT scan completed this afternoon, results pending.

## 2023-09-25 LAB
GLUCOSE BLDC GLUCOMTR-MCNC: 289 MG/DL (ref 70–99)
GLUCOSE BLDC GLUCOMTR-MCNC: 377 MG/DL (ref 70–99)
GLUCOSE BLDC GLUCOMTR-MCNC: 384 MG/DL (ref 70–99)
GLUCOSE BLDC GLUCOMTR-MCNC: 425 MG/DL (ref 70–99)
GLUCOSE BLDC GLUCOMTR-MCNC: 460 MG/DL (ref 70–99)
GLUCOSE BLDC GLUCOMTR-MCNC: 486 MG/DL (ref 70–99)
GLUCOSE BLDC GLUCOMTR-MCNC: 506 MG/DL (ref 70–99)

## 2023-09-25 PROCEDURE — 87205 SMEAR GRAM STAIN: CPT | Performed by: DERMATOLOGY

## 2023-09-25 PROCEDURE — 250N000013 HC RX MED GY IP 250 OP 250 PS 637: Performed by: STUDENT IN AN ORGANIZED HEALTH CARE EDUCATION/TRAINING PROGRAM

## 2023-09-25 PROCEDURE — 99233 SBSQ HOSP IP/OBS HIGH 50: CPT | Performed by: NURSE PRACTITIONER

## 2023-09-25 PROCEDURE — 99223 1ST HOSP IP/OBS HIGH 75: CPT | Mod: FS | Performed by: DIETITIAN, REGISTERED

## 2023-09-25 PROCEDURE — 99222 1ST HOSP IP/OBS MODERATE 55: CPT | Mod: GC | Performed by: DERMATOLOGY

## 2023-09-25 PROCEDURE — 99232 SBSQ HOSP IP/OBS MODERATE 35: CPT | Performed by: INTERNAL MEDICINE

## 2023-09-25 PROCEDURE — 999N000202 HC STATISTICAL VASC ACCESS NURSE TIME, 1-15 MINUTES

## 2023-09-25 PROCEDURE — 250N000013 HC RX MED GY IP 250 OP 250 PS 637: Performed by: INTERNAL MEDICINE

## 2023-09-25 PROCEDURE — 250N000013 HC RX MED GY IP 250 OP 250 PS 637: Performed by: PHYSICIAN ASSISTANT

## 2023-09-25 PROCEDURE — 87077 CULTURE AEROBIC IDENTIFY: CPT | Performed by: DERMATOLOGY

## 2023-09-25 PROCEDURE — 120N000002 HC R&B MED SURG/OB UMMC

## 2023-09-25 PROCEDURE — 250N000011 HC RX IP 250 OP 636: Performed by: INTERNAL MEDICINE

## 2023-09-25 RX ORDER — IBUPROFEN 200 MG
400 TABLET ORAL EVERY 6 HOURS PRN
Status: DISCONTINUED | OUTPATIENT
Start: 2023-09-25 | End: 2023-09-26 | Stop reason: HOSPADM

## 2023-09-25 RX ORDER — DOXYCYCLINE 100 MG/10ML
100 INJECTION, POWDER, LYOPHILIZED, FOR SOLUTION INTRAVENOUS EVERY 12 HOURS
Status: DISCONTINUED | OUTPATIENT
Start: 2023-09-25 | End: 2023-09-26

## 2023-09-25 RX ADMIN — SITAGLIPTIN 100 MG: 100 TABLET, FILM COATED ORAL at 08:39

## 2023-09-25 RX ADMIN — HYDROMORPHONE HYDROCHLORIDE 0.3 MG: 1 INJECTION, SOLUTION INTRAMUSCULAR; INTRAVENOUS; SUBCUTANEOUS at 02:23

## 2023-09-25 RX ADMIN — SIMETHICONE 80 MG: 80 TABLET, CHEWABLE ORAL at 14:13

## 2023-09-25 RX ADMIN — INSULIN ASPART 5 UNITS: 100 INJECTION, SOLUTION INTRAVENOUS; SUBCUTANEOUS at 14:14

## 2023-09-25 RX ADMIN — FAMOTIDINE 40 MG: 20 TABLET ORAL at 08:39

## 2023-09-25 RX ADMIN — POLYETHYLENE GLYCOL 3350 17 G: 17 POWDER, FOR SOLUTION ORAL at 21:00

## 2023-09-25 RX ADMIN — BICTEGRAVIR SODIUM, EMTRICITABINE, AND TENOFOVIR ALAFENAMIDE FUMARATE 1 TABLET: 50; 200; 25 TABLET ORAL at 08:39

## 2023-09-25 RX ADMIN — TAMSULOSIN HYDROCHLORIDE 0.4 MG: 0.4 CAPSULE ORAL at 08:39

## 2023-09-25 RX ADMIN — INSULIN ASPART 7 UNITS: 100 INJECTION, SOLUTION INTRAVENOUS; SUBCUTANEOUS at 18:36

## 2023-09-25 RX ADMIN — SIMETHICONE 80 MG: 80 TABLET, CHEWABLE ORAL at 08:39

## 2023-09-25 RX ADMIN — SIMETHICONE 80 MG: 80 TABLET, CHEWABLE ORAL at 21:00

## 2023-09-25 RX ADMIN — BUPROPION HYDROCHLORIDE 150 MG: 150 TABLET, FILM COATED, EXTENDED RELEASE ORAL at 08:39

## 2023-09-25 RX ADMIN — SENNOSIDES AND DOCUSATE SODIUM 2 TABLET: 50; 8.6 TABLET ORAL at 21:00

## 2023-09-25 RX ADMIN — INSULIN ASPART 7 UNITS: 100 INJECTION, SOLUTION INTRAVENOUS; SUBCUTANEOUS at 09:44

## 2023-09-25 RX ADMIN — FAMOTIDINE 40 MG: 20 TABLET ORAL at 21:00

## 2023-09-25 ASSESSMENT — ACTIVITIES OF DAILY LIVING (ADL)
ADLS_ACUITY_SCORE: 20

## 2023-09-25 NOTE — PROGRESS NOTES
"Brief Medicine Note:    Following for repeat CT abd/pelvis due to abdominal pain per patient today. Radiology report with \"Similar-appearing mildly dilated segment of small bowel within the  left lower quadrant, however oral contrast is seen distal to this segment to the ileocecal valve and in the cecum, suggestive of patencybeyond the focally dilated segment. This suggests low-grade/ partial or resolving obstruction.\"    Patient reported bowel movement to RN and is eating and actually frequently obtaining additional snacks which are leading to elevated BG readings to be covered by insulin. Not complaining of pain. Considered making NPO or at least decreasing to clear liquid diet but given patient seems to be tolerating intake and having bowel movements, less inclined to do at this time. Will monitor for worsening abdominal pain, nausea/vomiting, obstipation and if present - will plan for repeat NG and NPO status at that time.    TIFFANI RuvalcabaC  Internal Medicine [facility] Hospitalist  [facility]    "

## 2023-09-25 NOTE — CONSULTS
[facility] Inpatient Consult Dermatology Note    Impression/Plan:    1. Superficial papulonodules and carbuncles, lower abdomen and proximal thighs    This is a patient who had previously followed in our dermatology clinic (in 2018) for folliculitis. His presentation today is consistent with folliculitis with possible superimposed infection, such as eccthyma (caused by S. Aureus) or bullous impetigo.     Recommendations-  - Bacterial swab taken today  - Recommend 7 day course of antibiotic to cover MRSA such as doxycycline-will defer final antibiotic choice to primary team given small bowel obstruction  - Recommend bathing daily with Hibiclens. Can discharge patient home with this as well.   - OK to continue mupirocin to open lesions      Thank you for the dermatology consultation. Please place an outpatient dermatology referral on patient's discharge so we can re-establish care with patient in the outpatient setting. We will sign off at this time.    Attending, Dr. Cordoba staffed the patient.    TRISH ALEMAN MD (PGY-3)  Dermatology Resident   Pager: [phone]  I, Suly Cordoba MD, saw this patient with the resident and agree with the resident s findings and plan of care as documented in the resident s note.    Dermatology Problem List:  1. Folliculitis  - doxycycline 100mg po BID x 10 days,  triamcinolone 0.025% cream, hydroxyzine 25mg TID    Date of Admission: Sep 19, 2023   Encounter Date: 09/25/2023    Reason for Consultation:   Skin lesions on legs and lower abdomen    History of Present Illness:  Mr. Andrew Collier is a 57 year old male with a PMH of HIV who was admitted for a small bowel obstruction. Dermatology was consulted for skin lesions on lower abdomen, buttocks, and posterior neck. Patient reports he thinks these are bug bites as [place] starting on Sunday evening. These lesions are painful, and he's gotten a few  "new ones since Sunday.      Past Medical History:   Reviewed in epic, pertinent findings summarized as above    Social History:  Patient reports that he has been smoking cigarettes. He has a 10.00 pack-year smoking history. He has quit using smokeless tobacco. He reports that he does not currently use drugs after having used the following drugs: Marijuana and Methamphetamines. He reports that he does not drink alcohol.    Family History:  Family History   Problem Relation Age of Onset    Diabetes Brother     Diabetes Father     Alzheimer Disease Father     Unknown/Adopted Mother     Diabetes Paternal Grandfather     Cancer No family hx of         no skin cancer    Skin Cancer No family hx of         no famiy hx of skin cancer    Glaucoma No family hx of     Macular Degeneration No family hx of        Medications:  Reviewed in epic, pertinent findings summarized as above     Allergies   Allergen Reactions    Fentanyl Blisters     Per pt, after taking this medication had blisters develope    Tylenol [Acetaminophen] Itching    Dulaglutide Rash    Insulin Lispro Rash     Patient reported    Penicillin V Other (See Comments) and Rash     Diffuse maculopapular rash + feels \"high\", per pt.        Review of Systems:  As per HPI.     Physical exam:  Vitals: /77 (BP Location: Right arm)   Pulse 101   Temp 98.9  F (37.2  C) (Axillary)   Resp 15   Ht 1.651 m (5' 5\")   Wt 72.6 kg (160 lb)   SpO2 99%   BMI 26.63 kg/m    SKIN: Focused exam-    - On the lower abdomen, posterior proximal thighs, and inner thighs, there are superficial erythematous papulonodules and few carbuncles with evidence of post-inflammatory hyperpigmentation and scarring  -No other lesions of concern on areas examined.                     Laboratory:  Reviewed in epic, pertinent findings summarized as above        "

## 2023-09-25 NOTE — PLAN OF CARE
Assumed cares: [id number]  Vitals: VSS on RA  Pain: Pt reports pain at sores on right posterior thigh, right butt cheek and R testicle. PRN oxycodone offered but refused. Pt requested PRN dilaudid, provided to pt  Neuro: A&Ox4  Cardiac: WDL  Respiratory: WDL  GI/: Pt reports pain at R testicle. Pt refused assessment. Pain medication provided to pt.   Skin: Generalized sores. Mepilex applied to sores on R posterior thigh and R butt cheek  IV/Drains: L PIV- SL  Activity: Independent  Behavior: Pt is calm and cooperative    Plan of Care: Follow patient plan of care    Does my patient have a central line? (PICC, CVC)  YES/NO: No, if no, you can stop answering the following questions      Goal Outcome Evaluation:      Plan of Care Reviewed With: patient    Overall Patient Progress: no changeOverall Patient Progress: no change    Outcome Evaluation: Follow patient plan of care

## 2023-09-25 NOTE — PROGRESS NOTES
Diabetes Consult Daily  Progress Note          Assessment/Plan:     HPI:  57 year old male admitted on 9/19/2023. 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 He presented to the ED with 1 day of obstipation and vomiting concerning for recurrent SBO.     IDS consulted 9/24/2023 for assistance with severe hyperglycemia  - challenging to correct/safely control in setting of excessive intake and lack of disclosure to nursing to have snacks covered with insulin as discussed and agreed to.  Notes reflect he is procuring sancks and hding them from nursing.     Likely to discharge today - rounded with primary team.   GI consult done     IDS to sign off - all recommendations in and reviewed with patient and team       Assessment:     1.Type 2 diabetes with varied compliance on medication regimen with sub optimal control; A1c 13.3% (8/6/23)  2.Has hx of mild allergy to GLP-1 (trulicity) rash on chest/arms, legs  3.Profound hyperglycemia from uncontrolled eating in setting of food/housing insecurtiy  4.HIV; on biktarvy  5.Anemia  6.SBO hx - ? Gastroparesis from poorly controlled DM   7.Labile BG           Plan:       -  Lantus 38 unit(s) at 0900 AM with plan for reduction to 32 unit(s) when discharged based on his reports he had hypoglycemia once less access to food     -  Did get an additional x1 dose of Lantus 30 unit(s) last night at 2000 for projected persistent hyperglycemia from overeating - ordered again for tonight at 2000 hrs.     -  Novolog meal coverage 1 unit(s) per 6g cho AC meals/snacks. COVER all intake. Will discharge on Glimepiride see below      -  Novolog high resistance sliding scale insulin TID AC/HS and 0200 to start at 1200. Will not discharge on this    -   BG monitoring TID AC, HS and 0200    -  PTA  meds: re-start Glimeperide at discharge, continue Januvia     -  Hypoglycemia protocol    -  Recommend carb counting protocol - additionally -> Tray to desk, 75 g cho per meal restrictions and no extra snacks    -  Education :  none anticipated    -  Outpatient follow up:  recommend [facility] - has had visit request sent on 8/15      -  Planning for discharge today - will sign off if that continues to be the plan after rounds today with primary - reach out if plans change - IF he stays, will need another lantus dose this evening since will be eating excessively.     Recommendations for Discharge:   Instructions to patient were posted in AVS and discussed   Medications and supplies are to be ordered by primary service on discharge.   If there are problems or issues with ordering for discharge, you may contact the pharmacist directly for assistance   *please use the DIAB non-branded discharge supply order set ([id number])*     -blood glucose monitoring three times daily before meals and at bedtime     -Glargine (Lantus, or Basaglar, per insurance coverage) 32 units daily in the morning - reduced since you reported lows from the last dosing once access to food is limited      -Januvia 100 mg daiily     -Glimeperide 8 mg every AM when access to food and eating well and only 2 mg or 4 mg in AM if less access to food to prevent low blood sugars.  IF no food, do not take at all .      -follow up with [facility] in 1-2 weeks after discharge (appointment request sent to clinic coordinator on 8/15 - you should have an appt coming up)     -upon discharge, patient will need the following supplies: reports he has all supplies - Please order the glimeperide in 2 mg tabs and sugar tablets for low BG trends.       Plan discussed with patient, bedside RN/primary tem.         Interval History:     The last 24 hours progress and nursing notes reviewed.      BG trend:  Well above target 2/2 excessive eating and  "not telling RN so it can be covered appropriately.          Andrew is seen today with the primary team   He continues to overeat and not inform nursing - returns from [facility] from walking over and making urology appt for tomorrow.     Biggest risk of his behavior is escalating his insulin while impatient to meet his eating and discharging him on these huge doses and then he leaves and has no access to food.     We know from a similar plan last time, he did have BG in the 70-80's so will back off his outpatient plan further.   Andrew is aware of this as we discussed in detail.    He does not have Glimeperide - team to order  Aware he needs to manage his DM better we have talked about this consistently and reinforced this many, many times.       Planned Procedures/surgeries: none  D5W-containing solutions/medications: none    Recent Labs   Lab 09/25/23  0228 09/24/23  2122 09/24/23  1857 09/24/23  1121 09/24/23  0946 09/24/23  0833   * 357* 433* 204* 187* 214*         Nutrition:     Orders Placed This Encounter      High Consistent Carb (75 g CHO per Meal) Diet          PTA Regimen:     Lantus 38 unit(s) daily - states he has taking 37 unit(s)   Glimeperide - unk what he was taking regularly - was prescrbed to take 8 mg when eating well and 4 mg when less access to food  Januvia 100 mg daily           Review of Systems:   CC: none          Medications:   Steroid planning:  none     Physical Exam:   BP (!) 162/86 (BP Location: Left arm)   Pulse 94   Temp 98.9  F (37.2  C) (Oral)   Resp 20   Ht 1.651 m (5' 5\")   Wt 72.6 kg (160 lb)   SpO2 100%   BMI 26.63 kg/m      General:   A&O, NAD, pleasant, resting comfortably. Well nourished   HEENT:  NC/AT. MMM, EOMI, Anicteric. Hearing WNL.   Lungs:  unremarkable, no new cough, no SOB  ABD:   rounded, soft  Extremities:  Moving all extremities  Skin:  warm and dry, no obvious lesions/rash/bleeding  Neuro:  No focal neurological deficits  Psych:   Cooperative, appropriate " mood, good eye contact, congruent affect          Data:     Lab Results   Component Value Date    A1C 13.3 08/06/2023    A1C 11.5 04/18/2023    A1C 12.8 12/07/2022    A1C 12.8 11/17/2022    A1C 11.2 02/18/2022    A1C 9.7 11/11/2020    A1C 8.2 09/25/2020    A1C 8.2 08/27/2020    A1C 11.6 02/11/2020    A1C 12.3 01/07/2020        CBC RESULTS:   Recent Labs   Lab Test 09/22/23 0626   WBC 7.6   RBC 4.06*   HGB 11.5*   HCT 34.9*   MCV 86   MCH 28.3   MCHC 33.0   RDW 13.2          Recent Labs   Lab Test 09/25/23 0228 09/24/23 2122 09/22/23 0659 09/22/23 0626 09/21/23  0804 09/21/23  0655   NA  --   --   --  136  --  131*   POTASSIUM  --   --   --  3.8  3.8  --  3.8   CHLORIDE  --   --   --  100  --  98   CO2  --   --   --  24  --  25   ANIONGAP  --   --   --  12  --  8   * 357*   < > 448*   < > 430*   BUN  --   --   --  8.0  --  9.0   CR  --   --   --  0.72  --  0.69   LINDA  --   --   --  8.6  --  8.4*    < > = values in this interval not displayed.     Liver Function Studies -   Recent Labs   Lab Test 09/22/23 0626   PROTTOTAL 6.2*   ALBUMIN 3.2*   BILITOTAL 0.4   ALKPHOS 140*   AST 11   ALT 8     Lab Results   Component Value Date    INR 0.91 08/06/2023    INR 1.00 03/28/2023    INR 0.98 02/06/2023    INR 0.95 12/23/2022    INR 0.97 12/17/2022    INR 1.07 12/08/2022    INR 1.44 06/05/2022    INR 0.96 04/29/2022    INR 1.04 01/22/2022    INR 1.00 12/19/2020    INR 1.12 03/07/2020    INR 1.04 02/14/2020    INR 1.03 10/11/2019    INR 1.06 02/05/2018    INR 0.98 01/01/2018    INR 0.91 11/06/2017    INR 1.00 05/26/2017    INR 1.05 12/26/2016    INR 1.03 11/06/2016    INR 1.14 07/25/2016    INR 1.10 05/10/2016       VIKASH Dunn CNP, BC-ADM  Inpatient Diabetes Management Service  Pager - [phone]  Available on Vocera     To contact Endocrine Diabetes service:   From 7AM-5PM: page inpatient diabetes provider who is following the patient that day (see filed or incomplete progress notes/consult  notes).  If uncertain of provider assignment: page job code [phone]. (To page job code in-house dial 3 stars, [phone] then enter number).  For questions or updates AFTER HOURS from 5PM-7AM: page the diabetes job code for on call fellow: [phone]    Please notify inpatient diabetes service if changes are planned to steroids, nutrition, or if procedures are planned requiring prolonged NPO status. Diabetes Management Team job code: [phone]       I spent a total of 50 minutes on the date of the encounter doing prep/post-work, chart review, history and exam, documentation and further activities per the note including lab review, multidisciplinary communication, counseling the patient and/or coordinating care regarding acute hyper/hypoglycemic management, as well as discharge management and planning/communication.  See note for details.

## 2023-09-25 NOTE — DISCHARGE INSTRUCTIONS
IP Diabetes Management Team Discharge Instructions    Glucose Control Regimen:     -Glargine (Lantus, or Basaglar, per insurance coverage) 32 units daily in the morning - reduced since you reported lows from the last dosing once access to food is limited      -Januvia 100 mg daiily     -Glimeperide 8 mg every AM when access to food and eating well and only 2 mg or 4 mg in AM if less access to food to prevent low blood sugars.  IF no food, do not take at all .      -follow up with [facility] in 1-2 weeks after discharge (appointment request sent to clinic coordinator on 8/15 - you should have an appt coming up)        Blood Glucose Checks: three times daily before meals, and at bedtime.    Endocrinology Outpatient follow up: An appointment request was sent to the [facility] coordinator to schedule your outpatient diabetes appointment 1-2 weeks from discharge. Please call the clinic at [phone] if you do not have an appointment scheduled on discharge, or if you have non-urgent questions regarding your blood sugars or insulin.     If you have urgent questions or concerns regarding your blood sugars or insulin, you may contact [phone] (the main hospital ). Ask to speak with the endocrinologist on call.    Your target A1c value is less than 7%.    Thank you for letting the Diabetes Management Team be involved in your care!  VIKASH Jessica CNP, BC-ADM

## 2023-09-25 NOTE — PLAN OF CARE
Goal Outcome Evaluation:       A&O VSS on RA. PIV SL. Up walking halls and getting snacks and hiding them under blankets. Pt aware just wanting to cover with insulin, however continues to hide snacks. Did not order dinner. Stated BM today and voiding without difficulty. Denies pain this shift. Monitoring BG. Calls to make needs known.

## 2023-09-25 NOTE — TELEPHONE ENCOUNTER
MEDICAL RECORDS REQUEST   [facility]  Urology Clinic  [address] [place] [address]  PHONE: [phone]  Fax: [phone]        FUTURE VISIT INFORMATION                                                   Andrew Collier, : 1965 scheduled for future visit at [facility]    APPOINTMENT INFORMATION:  Date: 2023  Provider:  Cielo Khan PA-C  Reason for Visit/Diagnosis: SCROTAL PAIN    RECORDS REQUESTED FOR VISIT                                                     NOTES  STATUS/DETAILS   DISCHARGE REPORT from the ER  yes, 2023 -- [facility] ED   MEDICATION LIST  yes   LABS     URINALYSIS (UA)  yes   IMAGES  yes, 2023, 2023, 2023 -- CT ABD PELVIS    2023 -- US TESTICULAR AND SCROTUM     PRE-VISIT CHECKLIST      Joint diagnostic appointment coordinated correctly          (ensure right order & amount of time) Yes   RECORD COLLECTION COMPLETE Yes

## 2023-09-25 NOTE — CONSULTS
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 Dowling), GERD, HLD, PTSD/MDD, poorly controlled DM2 (A1C 13.3%), substance abuse (including IVDU, meth and marijuana) and housing/food insecurity.    #Recurrent SBOs  #HIV (on Biktarvy with suspected inconsistent medication adherence)  #DM (type 2) - poorly controlled with A1C 13.3%  #Housing and food insecurity  Presented with worsening abd pain, hard stools followed by diarrhea (non-bloody) and N/V (non-bloody) x 3~4 days PTA.  Reports this pattern is a typical constellation of sx preceding development of SBO.  Is homeless and suspect non-adherence to Biktarvy, insulin/DM meds, does not take any medications for constipation and does not follow any particular diet for his DM due to food insecurity.    Initial CT A/P upon adm 9/19 noting focal area of SB dilation in LLQ with multiple points of transition c/w SBO related to areas of adhesions.  Subsequent Upper GI series 9/20 noting contrast passing throughout colon and repeat CT A/P 9/24 noting previous dilated segment of SB to remain similar but oral contrast passed distal to ICV/into cecum  suggesting resolving pSBO.  Per review of CT, noted stomach appears somewhat dilated with large amount of food particles (which were not present on 9/19 CT) and unclear timing of po to time of imaging and if e/o poor emptying (given  poorly controlled DM with A1C 13.3%, historically 10-12% since at least 1/2022).      GI has seen previously given multiple admissions with similar presentations and most recent GI consult 12/2022 at which time optimization of DM, aggressive bowel med regimen and avoidance of motility slowing agents was recommended. Most recently had EGD 6/6/2022 that was only notable for LA grade B esophagitis and duodenitis with bx negative for pathology to explain N/V.  Additionally, had SB enteroscopy and colonoscopy (1/2016), dx lap with ANASTASIIA (most recently 4/2018) and MRE 1/2019 that were all unrevealing.  Previous GI notes with suspicion that chronic obstructive episodes related to past HIV-related enteritis that resolved but left pt with extensive adhesions on dx laps.  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 Additionally, suspect abd pain and N/V likely multifactorial from SBOs as well as component of gastroparesis related to poorly controlled DM.    Surgery team following this adm but recommended conservative management.  NGT removed 9/20.  Advanced to High CHO diet since 9/24 and tolerating without N/V and having improved stool outputs.    #Moderate protein-calorie malnutrition   Reports eating <50% of his usual the duration of this adm due to SBO/previous NPO status and NGT.  Pt unclear if any wt loss but possibly 5# loss in 1 month (165# on 8/14 compared to 160# this adm 9/19) = 3% loss.  However, now with good appetite, eating 100% and denies abd pain, N/V.  Malnutrition multifactorial due to intolerance to po with recurrent SBOs but also in the setting of food insecurity.    #GERD  Currently receiving famotidine 40 mg BID.  Denies sx of reflux this adm.    Recommendations:  -SW consult to help with housing, food insecurity and coverage for needed  medications.  -Agree with Endo consult to optimize glycemic control.  -Continue Biktarvy per previous ID recs with follow up with OP ID as approp/able.  -Consider RD consult for DM diet ed, gastroparesis diet.  -Bowel med regimen: increase Miralax to TID (titrate daily and up to QID to ensure 1-2 soft/easily passed BM daily), continue senna-docusate 2 tablets BID.  -Do not recommend gastric emptying study at this time in the setting of resolving pSBO and recent opioid use this adm.  Consider only once improved DM control and if sx c/f gastroparesis after SBO resolved.  -Follow up with PCP for ongoing management of DM, HIV and GI sx.  -Analgesia/Antiemetics per primary team.    Discussed with Dr. Castillo and patient.    Gastroenterology follow up recommendations:   None warranted at this time.    Thank you for involving us in this patient's care. Please do not hesitate to contact the GI service with any questions or concerns.     Pt seen and care plan discussed with Dr. Kumar, GI staff physician.    The inpatient gastroenterology service will sign off at this time. Thank you for allowing us to participate in the care of this patient. Please do not hesitate to page the GI service with any questions or concerns.      Overall time spent on the date of this encounter preparing to see the patient (including chart review of available notes, clinical status events, imaging and labs); obtaining and/or reviewing separately obtained history ; ordering medications, tests or procedures; communicating with other health care professionals; and documenting the above clinical information in the electronic medical record was 90 minutes.      Radha Rodriguez PA-C  GI Service  [facility]  Text Page  -------------------------------------------------------------------------------------------------------------------       Reason for Consultation:   narrowing at cecum, resolving/partial obstruction,  "recurrent abdomninal pain, requiring hospitalizatios, any non medical interventions?            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 Dowling), GERD, HLD, PTSD/MDD, poorly controlled DM2 (A1C 13.3%), substance abuse (including IVDU, meth and marijuana) and housing/food insecurity.    Patient seen and examined at 14:00. History is obtained from patient who reports onset of increasing left sided abd pain x3-4 days PTA which is typical of developing SBO pain. Additionally reported N/V (non-bloody) x3.  Reports has had numerous episodes of SBO (8-9 over the past ~1 year) which typically present as increasing abd pain, stools changing from soft to hard stool followed by diarrhea. Typically has 1 soft to hard BM daily but does not take any bowel meds to help keep regular due to financially cannot afford and is homeless currently (working with  to help find shelter).  Does not follow any particular diet for his DM and just eats \"whatever I can get\" which mostly consists of carbs/sweets.  Reports he believes his bowel obstructions are caused by insulin (unclear if consistently taking his insulin/DM meds).  Reports hx of meth use but \"clean x57 days\" and denies any other drug use/EtOH.    Today reports eating 100% of meals without N/V, denied ongoing abd pain and had x2 BM.    Previous Procedures:  6/6/2022: EGD under moderate sedation with Dr. Mcdonnell  Indication: suspected GOO  Findings:       LA Grade B (one or more mucosal breaks greater than 5 mm, not extending        between the tops of two mucosal folds) esophagitis with no " bleeding was        found.        A small hiatal hernia was present.        There is no endoscopic evidence of bleeding, erythema or inflammatory        changes suggestive of gastritis, mucosal abnormalities or ulceration in        the entire examined stomach.        The examined duodenum was normal. Biopsies were taken with a cold        forceps for histology.   Impression:     - LA Grade B reflux esophagitis with no bleeding.                          - Small hiatal hernia.                          - Normal examined duodenum. Biopsied.   Pathology   Final Diagnosis   A.  DUODENUM, BIOPSY:  Acute duodenitis; uncertain etiology; no evidence of celiac disease  (See Comment)     B.  STOMACH, ANTRUM, BIOPSY:  Gastric antral & fundic mucosa with fundic PPI-related changes; no significant inflammation; negative for H. pylori, intestinal metaplasia, or dysplasia           1/22/2016: Colonoscopy under moderate sedation with Dr. Saini   Indications:   Chronic diarrhea, found to have partial SBO with thickening in the distal jejunum which has resolved on interval imaging. Colonoscopy performed for further evaluation.   Findings:       The perianal and digital rectal examinations were normal.        The colon (entire examined portion) appeared normal. Biopsies were taken        with a cold forceps from the right colon and left colon for evaluation        of microscopic colitis.        The ileum, 10-15 cm from the ileocecal valve appeared normal.                                                                                    Impression:   - The entire examined colon is normal. Biopsied to                        evaluate for microscopic colitis.                        - The examined portion of the ileum was normal.                        - Based on this exam, no etiology of small bowel                        thickening was noted. We were unable to reach the area                        of concern. If further evaluation  needed, would consider                        single balloon enteroscopy, versus capsule endoscopy,                        versus repeat imaging to see if abnormality persists (as                        already improving on CT).   Pathology:   FINAL DIAGNOSIS:   A. Large colon, right colon, biopsy:   -Colonic mucosa with no significant histologic abnormality   -See comment     B. Large bowel, left colon, biopsy:   -Colonic mucosa with no significant histologic abnormality   -See comment     COMMENT:   Some focal areas showed slightly increased subepithelial collagen.   Trichrome stain performed on both specimens with appropriate controls   show no conclusive evidence of thickened subepithelial collagen layer.     1/18/2016: SB Enteroscopy under moderate sedation with Dr. Saini   Indications:         Abnormal abdominal CT   Impression:  - Z-line regular, 35 cm from the incisors.                        - LA Grade A reflux esophagitis.                        - White plaques mucosa in the esophagus. Biopsied.                        - Striped and patchy moderately erythematous mucosa                        without bleeding was found in the gastric body and in                        the gastric antrum. Some areas appeared with more                        discrete beefy red erythema. There were a few scattered                        small erosions. Biopsied.                        - Normal examined duodenum. Biopsied.                        - The examined portion of the jejunum was normal.                        Biopsied.                        - Many biopsies were taken. Please see details of                        biopsies above under findings   Pathology:   FINAL DIAGNOSIS:  A. Small bowel, biopsy:  -Small intestinal mucosa with no significant histologic abnormality    B. Stomach, biopsy:  -Features of reactive gastropathy  -Negative for intestinal metaplasia or dysplasia    C. Small bowel, jejunum, biopsy:  -Small  intestinal mucosa with no significant histologic abnormality  -Negative for HSV and CMV by immunostaining  -PAS stain negative for fungal elements    D. Small bowel, duodenum, biopsy:  -Duodenal mucosa with no significant histologic abnormality  -Negative for HSV and CMV by immunostaining  -PAS stain negative for fungal elements    E. Stomach, biopsy:  -Gastric mucosa with no significant inflammation  -No H. Pylori like organisms identified on routine sections  -Negative for intestinal metaplasia or dysplasia  -Negative for HSV, CMV and HHV 8 by immunostaining  -GMS stain negative for fungal elements    F. Esophagus, biopsy:  -Squamous epithelium with superficial ulceration, acute inflammation and  ballooning degeneration  -Negative for HSV and CMV by immunostaining  -PAS stain negative for fungal elements             Past Medical History:   Reviewed and edited as appropriate  Past Medical History:   Diagnosis Date    Acute appendicitis with localized peritonitis 1/31/2018    AIDS (H)     Allergic rhinitis due to other allergen     DNS    Anal dysplasia     Chronic abdominal pain     CNS toxoplasmosis (H)     COVID-19 1/11/2022    Diabetes type 2, controlled (H)     GERD (gastroesophageal reflux disease)     Herpes zoster 9/23/2016    History of seizure     History of substance abuse (H)     HIV (human immunodeficiency virus infection) (H)     HLD (hyperlipidemia)     Lung nodules     Periungual wart     Pneumonia 1/6/2019    PTSD (post-traumatic stress disorder)     Sleep apnea     doesn't use CPAP            Past Surgical History:   Reviewed and edited as appropriate   Past Surgical History:   Procedure Laterality Date    ANOSCOPY N/A 9/9/2020    Procedure: Exam Under Anesthesia, ANOSCOPY, fulgeration of rectal fissures with Rectal Biopsies;  Surgeon: Thanh Lundberg MD;  Location: [facility] OR    COLONOSCOPY Left 1/22/2016    Procedure: COMBINED COLONOSCOPY, SINGLE OR MULTIPLE BIOPSY/POLYPECTOMY BY BIOPSY;  Surgeon:  Clark Saini MD;  Location:  GI    ESOPHAGOSCOPY, GASTROSCOPY, DUODENOSCOPY (EGD), COMBINED N/A 6/6/2022    Procedure: ESOPHAGOGASTRODUODENOSCOPY, WITH BIOPSY;  Surgeon: Kecia Benítez MD;  Location: [facility]    HC EXPLORE UNDESC TESTIS,INGUIN/SCROTAL      LAPAROSCOPIC APPENDECTOMY N/A 1/31/2018    Procedure: LAPAROSCOPIC APPENDECTOMY;  LAPAROSCOPIC APPENDECTOMY;  Surgeon: Dawn Holt MD;  Location: [facility]    LAPAROSCOPY DIAGNOSTIC (GENERAL) N/A 7/26/2016    Procedure: LAPAROSCOPY DIAGNOSTIC (GENERAL);  Surgeon: Susannah Arriaga MD;  Location: [facility]    LAPAROSCOPY DIAGNOSTIC (GENERAL) N/A 4/16/2018    Procedure: LAPAROSCOPY DIAGNOSTIC (GENERAL);  Diagnostic laparoscopy and lysis of adhesions;  Surgeon: Prince Dowling MD;  Location: [facility] OR    OPTICAL TRACKING SYSTEM CRANIOTOMY, EXCISE TUMOR, COMBINED Left 4/10/2015    Procedure: COMBINED OPTICAL TRACKING SYSTEM CRANIOTOMY, EXCISE TUMOR;  Surgeon: Mirlande Colmenares MD;  Location: [facility] OR    REPAIR GAMEKEEPER'S THUMB Right 12/2/2016    Procedure: REPAIR LIGAMENT ULNAR COLLATERAL THUMB (GAMEKEEPER'S);  Surgeon: Evin Zamorano MD;  Location:  OR    ZZC NONSPECIFIC PROCEDURE      right forearm fracture              Social History:   Homeless, working with  to get into shelter.    Denies EtOH.  Hx Meth use (last use 57 days ago)         Family History:   Patient's family history is reviewed today and is non-contributory    Family History   Problem Relation Age of Onset    Diabetes Brother     Diabetes Father     Alzheimer Disease Father     Unknown/Adopted Mother     Diabetes Paternal Grandfather     Cancer No family hx of         no skin cancer    Skin Cancer No family hx of         no famiy hx of skin cancer    Glaucoma No family hx of     Macular Degeneration No family hx of              Allergies:   Reviewed and edited as appropriate     Allergies   Allergen Reactions    Fentanyl Blisters     Per pt, after taking  "this medication had blisters develope    Tylenol [Acetaminophen] Itching    Dulaglutide Rash    Insulin Lispro Rash     Patient reported    Penicillin V Other (See Comments) and Rash     Diffuse maculopapular rash + feels \"high\", per pt.             Medications:     Current Facility-Administered Medications   Medication    acetaminophen (TYLENOL) tablet 975 mg    alum & mag hydroxide-simethicone (MAALOX) suspension 15 mL    bictegravir-emtricitabine-tenofovir (BIKTARVY) -25 MG per tablet 1 tablet    buPROPion (WELLBUTRIN XL) 24 hr tablet 150 mg    dextrose 10% infusion    glucose gel 15-30 g    Or    dextrose 50 % injection 25-50 mL    Or    glucagon injection 1 mg    famotidine (PEPCID) tablet 40 mg    ibuprofen (ADVIL/MOTRIN) tablet 400 mg    insulin aspart (NovoLOG) injection (RAPID ACTING)    insulin aspart (NovoLOG) injection (RAPID ACTING)    insulin aspart (NovoLOG) injection (RAPID ACTING)    insulin aspart (NovoLOG) injection (RAPID ACTING)    insulin glargine (LANTUS PEN) injection 38 Units    Lidocaine (LIDOCARE) 4 % Patch 2 patch    lidocaine (LMX4) cream    lidocaine 1 % 0.1-1 mL    naloxone (NARCAN) injection 0.2 mg    Or    naloxone (NARCAN) injection 0.4 mg    Or    naloxone (NARCAN) injection 0.2 mg    Or    naloxone (NARCAN) injection 0.4 mg    ondansetron (ZOFRAN ODT) ODT tab 4 mg    Or    ondansetron (ZOFRAN) injection 4 mg    oxyCODONE (ROXICODONE) tablet 5 mg    polyethylene glycol (MIRALAX) Packet 17 g    prochlorperazine (COMPAZINE) injection 5 mg    Or    prochlorperazine (COMPAZINE) tablet 5 mg    Or    prochlorperazine (COMPAZINE) suppository 25 mg    senna-docusate (SENOKOT-S/PERICOLACE) 8.6-50 MG per tablet 2 tablet    simethicone (MYLICON) chewable tablet 80 mg    sitagliptin (JANUVIA) tablet 100 mg    sodium chloride (PF) 0.9% PF flush 3 mL    sodium chloride (PF) 0.9% PF flush 3 mL    tamsulosin (FLOMAX) capsule 0.4 mg             Review of Systems:     A complete review of " systems was performed and is negative except as noted in the HPI           Physical Exam:   Temp: 98.9  F (37.2  C) Temp src: Oral BP: (!) 162/86 Pulse: 94   Resp: 20 SpO2: 100 % O2 Device: None (Room air)    Wt:   Wt Readings from Last 2 Encounters:   09/19/23 72.6 kg (160 lb)   09/10/23 72.6 kg (160 lb)      General: pleasant male lying in bed/watching TV in NAD.  Answers appropriately.    HEENT: Head is AT/NC. Sclera anicteric. No conjunctival injection.  Oropharynx is clear, moist and w/o exudate or lesions. Poor dentition/missing teeth.  Lungs: Non-labored breathing on RA.   Heart: Regular rate and rhythm.    Abdomen: Soft, non-distended, non-tender.  No rebound or peritoneal signs.   Extremities: WWP, no pedal edema.  MSK: no gross deformity, no overt muscle wasting  Skin: No jaundice or rash  Neurologic: Grossly non-focal.  CN 2-12 grossly intact.   Psych: mood appropriate to situation         Data:   Labs and imaging below were independently reviewed and interpreted    LAB WORK:    BMP  Recent Labs   Lab 09/25/23  0800 09/25/23  0228 09/24/23  2122 09/24/23  1857 09/22/23  0659 09/22/23  0626 09/21/23  0804 09/21/23  0655 09/20/23  2252 09/20/23  1934 09/20/23  0919 09/20/23  0616 09/19/23  2118 09/19/23  1635   NA  --   --   --   --   --  136  --  131*  --   --   --  139  --  135*   POTASSIUM  --   --   --   --   --  3.8  3.8  --  3.8  --  4.1  --  3.4  --  3.5   CHLORIDE  --   --   --   --   --  100  --  98  --   --   --  103  --  98   LINDA  --   --   --   --   --  8.6  --  8.4*  --   --   --  9.6  --  10.0   CO2  --   --   --   --   --  24  --  25  --   --   --  25  --  25   BUN  --   --   --   --   --  8.0  --  9.0  --   --   --  15.1  --  15.7   CR  --   --   --   --   --  0.72  --  0.69  --   --   --  0.66*  --  0.72   * 384* 357* 433*   < > 448*   < > 430*   < >  --    < > 105*   < > 248*    < > = values in this interval not displayed.     CBC  Recent Labs   Lab 09/22/23  0626 09/21/23  0655  09/20/23  0616 09/19/23  1637   WBC 7.6 7.4 9.3 11.6*   RBC 4.06* 4.18* 4.60 5.30   HGB 11.5* 11.9* 13.2* 14.9   HCT 34.9* 35.3* 39.3* 43.4   MCV 86 84 85 82   MCH 28.3 28.5 28.7 28.1   MCHC 33.0 33.7 33.6 34.3   RDW 13.2 13.6 13.7 13.8    334 374 477*     INRNo lab results found in last 7 days.  LFTs  Recent Labs   Lab 09/22/23  0626 09/21/23  0655 09/20/23  0616 09/19/23  1635   ALKPHOS 140* 103 105 134*   AST 11 12 16 16   ALT 8 7 <5 12   BILITOTAL 0.4 0.5 0.6 0.6   PROTTOTAL 6.2* 6.0* 6.7 8.2   ALBUMIN 3.2* 3.0* 3.5 4.4      PANC  Recent Labs   Lab 09/19/23  1635   LIPASE 36       IMAGING:  (Personally reviewed)    9/24/2023: CT ABDOMEN PELVIS W CONTRAST,   IMPRESSION:   1. Similar-appearing mildly dilated segment of small bowel within the  left lower quadrant, however oral contrast is seen distal to this  segment to the ileocecal valve and in the cecum, suggestive of patency  beyond the focally dilated segment. This suggests low-grade/ partial  or resolving obstruction.   2. Similar-appearing thickening of the bladder wall, and associated  prostatomegaly suggestive of chronic outlet obstruction.   3. Unchanged appearance of penile prosthesis/implant with decompressed  reservoir and foci of air seen within the lines.     9/23/2023: XR ABDOMEN PORT 1 VIEW   FINDINGS:   2 AP portable supine views of the abdomen and pelvis. Inflatable  penile prosthesis.     There is a large stomach bubble. Small amount of gas is present in the  rectum..No pneumatosis, portal venous gas. No osseous abnormality is                                                                      IMPRESSION:   Prominent gastric distention without additional findings of  obstruction..     9/20\2023: Gastrografin challenge   Findings: Gastric tube with tip and sidehole projecting in the  stomach. Gastrografin is visualized throughout the colon. Prominent  gas-filled small bowel in the left abdomen measuring up to 4.4 cm. No  appreciable  pneumatosis                                                                   Impression: Negative Gastrografin challenge with Gastrografin  visualized throughout the colon.    ______________________________________________    1/9/2019: MR ENTEROGRAPHY WO AND W CONTRAST  IMPRESSION:   1. No findings to explain adynamic ileus. No evidence of bowel  obstruction or inflammatory bowel disease.  2. Left lower lobe pneumonia, also demonstrated on 1/6/2019.    =======================================================================

## 2023-09-26 ENCOUNTER — PRE VISIT (OUTPATIENT)
Dept: UROLOGY | Facility: CLINIC | Age: 58
End: 2023-09-26

## 2023-09-26 ENCOUNTER — TELEPHONE (OUTPATIENT)
Dept: INFECTIOUS DISEASES | Facility: CLINIC | Age: 58
End: 2023-09-26

## 2023-09-26 VITALS
DIASTOLIC BLOOD PRESSURE: 78 MMHG | OXYGEN SATURATION: 99 % | RESPIRATION RATE: 20 BRPM | TEMPERATURE: 98.3 F | HEART RATE: 90 BPM | WEIGHT: 160 LBS | SYSTOLIC BLOOD PRESSURE: 131 MMHG | HEIGHT: 65 IN | BODY MASS INDEX: 26.66 KG/M2

## 2023-09-26 LAB
ALBUMIN SERPL BCG-MCNC: 3.5 G/DL (ref 3.5–5.2)
ALP SERPL-CCNC: 125 U/L (ref 40–129)
ALT SERPL W P-5'-P-CCNC: 12 U/L (ref 0–70)
ANION GAP SERPL CALCULATED.3IONS-SCNC: 12 MMOL/L (ref 7–15)
AST SERPL W P-5'-P-CCNC: 16 U/L (ref 0–45)
BILIRUB SERPL-MCNC: 0.2 MG/DL
BUN SERPL-MCNC: 13.6 MG/DL (ref 8–23)
CALCIUM SERPL-MCNC: 9 MG/DL (ref 8.6–10)
CHLORIDE SERPL-SCNC: 98 MMOL/L (ref 98–107)
CREAT SERPL-MCNC: 0.74 MG/DL (ref 0.67–1.17)
DEPRECATED HCO3 PLAS-SCNC: 22 MMOL/L (ref 22–29)
EGFRCR SERPLBLD CKD-EPI 2021: >90 ML/MIN/1.73M2
ERYTHROCYTE [DISTWIDTH] IN BLOOD BY AUTOMATED COUNT: 13.3 % (ref 10–15)
GLUCOSE BLDC GLUCOMTR-MCNC: 329 MG/DL (ref 70–99)
GLUCOSE BLDC GLUCOMTR-MCNC: 404 MG/DL (ref 70–99)
GLUCOSE SERPL-MCNC: 404 MG/DL (ref 70–99)
HCT VFR BLD AUTO: 34.7 % (ref 40–53)
HGB BLD-MCNC: 11.8 G/DL (ref 13.3–17.7)
MAGNESIUM SERPL-MCNC: 1.5 MG/DL (ref 1.7–2.3)
MCH RBC QN AUTO: 28.4 PG (ref 26.5–33)
MCHC RBC AUTO-ENTMCNC: 34 G/DL (ref 31.5–36.5)
MCV RBC AUTO: 84 FL (ref 78–100)
PHOSPHATE SERPL-MCNC: 3.1 MG/DL (ref 2.5–4.5)
PLATELET # BLD AUTO: 340 10E3/UL (ref 150–450)
POTASSIUM SERPL-SCNC: 3.7 MMOL/L (ref 3.4–5.3)
PROT SERPL-MCNC: 6.6 G/DL (ref 6.4–8.3)
RBC # BLD AUTO: 4.15 10E6/UL (ref 4.4–5.9)
SODIUM SERPL-SCNC: 132 MMOL/L (ref 136–145)
WBC # BLD AUTO: 6.9 10E3/UL (ref 4–11)

## 2023-09-26 PROCEDURE — 99232 SBSQ HOSP IP/OBS MODERATE 35: CPT | Performed by: NURSE PRACTITIONER

## 2023-09-26 PROCEDURE — 36415 COLL VENOUS BLD VENIPUNCTURE: CPT | Performed by: PHYSICIAN ASSISTANT

## 2023-09-26 PROCEDURE — 250N000013 HC RX MED GY IP 250 OP 250 PS 637: Performed by: PHYSICIAN ASSISTANT

## 2023-09-26 PROCEDURE — 250N000013 HC RX MED GY IP 250 OP 250 PS 637: Performed by: INTERNAL MEDICINE

## 2023-09-26 PROCEDURE — 84100 ASSAY OF PHOSPHORUS: CPT | Performed by: PHYSICIAN ASSISTANT

## 2023-09-26 PROCEDURE — 83735 ASSAY OF MAGNESIUM: CPT | Performed by: PHYSICIAN ASSISTANT

## 2023-09-26 PROCEDURE — 85027 COMPLETE CBC AUTOMATED: CPT | Performed by: PHYSICIAN ASSISTANT

## 2023-09-26 PROCEDURE — 80053 COMPREHEN METABOLIC PANEL: CPT | Performed by: PHYSICIAN ASSISTANT

## 2023-09-26 PROCEDURE — 250N000012 HC RX MED GY IP 250 OP 636 PS 637: Performed by: NURSE PRACTITIONER

## 2023-09-26 PROCEDURE — 99239 HOSP IP/OBS DSCHRG MGMT >30: CPT | Performed by: STUDENT IN AN ORGANIZED HEALTH CARE EDUCATION/TRAINING PROGRAM

## 2023-09-26 PROCEDURE — 250N000013 HC RX MED GY IP 250 OP 250 PS 637: Performed by: STUDENT IN AN ORGANIZED HEALTH CARE EDUCATION/TRAINING PROGRAM

## 2023-09-26 RX ORDER — POTASSIUM CHLORIDE 750 MG/1
20 TABLET, EXTENDED RELEASE ORAL ONCE
Status: COMPLETED | OUTPATIENT
Start: 2023-09-26 | End: 2023-09-26

## 2023-09-26 RX ORDER — DOXYCYCLINE 100 MG/1
100 CAPSULE ORAL EVERY 12 HOURS SCHEDULED
Status: DISCONTINUED | OUTPATIENT
Start: 2023-09-26 | End: 2023-09-26 | Stop reason: HOSPADM

## 2023-09-26 RX ORDER — DOXYCYCLINE 100 MG/1
100 CAPSULE ORAL EVERY 12 HOURS
Qty: 14 CAPSULE | Refills: 0 | Status: SHIPPED | OUTPATIENT
Start: 2023-09-26 | End: 2023-11-09

## 2023-09-26 RX ADMIN — SIMETHICONE 80 MG: 80 TABLET, CHEWABLE ORAL at 08:33

## 2023-09-26 RX ADMIN — FAMOTIDINE 40 MG: 20 TABLET ORAL at 08:33

## 2023-09-26 RX ADMIN — DOXYCYCLINE HYCLATE 100 MG: 100 CAPSULE ORAL at 08:34

## 2023-09-26 RX ADMIN — POTASSIUM CHLORIDE 20 MEQ: 750 TABLET, EXTENDED RELEASE ORAL at 11:18

## 2023-09-26 RX ADMIN — SITAGLIPTIN 100 MG: 100 TABLET, FILM COATED ORAL at 08:33

## 2023-09-26 RX ADMIN — BICTEGRAVIR SODIUM, EMTRICITABINE, AND TENOFOVIR ALAFENAMIDE FUMARATE 1 TABLET: 50; 200; 25 TABLET ORAL at 08:33

## 2023-09-26 RX ADMIN — BUPROPION HYDROCHLORIDE 150 MG: 150 TABLET, FILM COATED, EXTENDED RELEASE ORAL at 08:34

## 2023-09-26 RX ADMIN — INSULIN ASPART 9 UNITS: 100 INJECTION, SOLUTION INTRAVENOUS; SUBCUTANEOUS at 11:17

## 2023-09-26 RX ADMIN — TAMSULOSIN HYDROCHLORIDE 0.4 MG: 0.4 CAPSULE ORAL at 08:34

## 2023-09-26 ASSESSMENT — ACTIVITIES OF DAILY LIVING (ADL)
ADLS_ACUITY_SCORE: 20

## 2023-09-26 NOTE — PROGRESS NOTES
Diabetes Consult Daily  Progress Note          Assessment/Plan:     HPI:  57 year old male admitted on 9/19/2023. 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 He presented to the ED with 1 day of obstipation and vomiting concerning for recurrent SBO.     IDS consulted 9/24/2023 for assistance with severe hyperglycemia  - challenging to correct/safely control in setting of excessive intake and lack of disclosure to nursing to have snacks covered with insulin as discussed and agreed to.  Notes reflect he is procuring sancks and hding them from nursing.     Likely to discharge today  GI consult done 9/25    IDS to sign off - all recommendations in and reviewed with patient and team     Assessment:     1.Type 2 diabetes with varied compliance on medication regimen with sub optimal control; A1c 13.3% (8/6/23)  2.Has hx of mild allergy to GLP-1 (trulicity) rash on chest/arms, legs  3.Profound hyperglycemia from uncontrolled eating in setting of food/housing insecurtiy  4.HIV; on biktarvy  5.Anemia  6.SBO hx - ? Gastroparesis from poorly controlled DM   7.Labile BG           Plan:       -  Increased to Lantus 45 unit(s) at 0900 AM with plan for reduction to 32 unit(s) when discharged based on his reports he had hypoglycemia once less access to food     -  Did get an additional x1 dose of Lantus 35 unit(s) last night at 2000 for projected persistent hyperglycemia from overeating     -  Increased Novolog meal coverage 1 unit(s) per 4g cho AC meals/snacks. COVER all intake. Will discharge on Glimepiride see below      -  Increased to Novolog 2/30 custom resistance sliding scale insulin TID AC/HS and 0200.  Will not discharge on this    -   BG monitoring TID AC, HS and 0200    -  PTA meds: re-start Glimeperide at discharge,  continue Januvia     -  Hypoglycemia protocol    -  Recommend carb counting protocol - additionally -> Tray to desk, 75 g cho per meal restrictions and no extra snacks    -  Education :  none anticipated    -  Outpatient follow up:  recommend [facility] - has had visit request sent on 8/15      -  Planning for likely discharge today - will sign off if that continues to be the plan after rounds today with primary - reach out if plans change - IF he stays, will need another lantus dose this evening since will be eating excessively.     Recommendations for Discharge:   Instructions to patient were posted in AVS and discussed   Medications and supplies are to be ordered by primary service on discharge.   If there are problems or issues with ordering for discharge, you may contact the pharmacist directly for assistance   *please use the [employer] non-branded discharge supply order set ([id number])*     -blood glucose monitoring three times daily before meals and at bedtime     -Glargine (Lantus, or Basaglar, per insurance coverage) 32 units daily in the morning - reduced since you reported lows from the last dosing once access to food is limited - START tomorrow Andrew on 9/27.     -Januvia 100 mg daiily     -Glimeperide 8 mg every AM when access to food and eating well and only 2 mg or 4 mg in AM if less access to food to prevent low blood sugars.  IF no food, do not take at all .      -follow up with [facility] in 1-2 weeks after discharge (appointment request sent to clinic coordinator on 8/15 - you should have an appt coming up)     -upon discharge, patient will need the following supplies: reports he has all supplies - Please order the glimeperide in 2 mg tabs and sugar tablets for low BG trends.       Plan discussed with patient, bedside RN/primary tem.         Interval History:     The last 24 hours progress and nursing notes reviewed.      BG trend:  Well above target 2/2 excessive eating and not  "telling RN so it can be covered appropriately.    This AM Andrew is screaming and upset \"you said no more insulin\" which in fact not what was said at all but rather when he discharges he will NOT be on meal insulin but rather Glimepiride - he calms down and then \"remembers.  He is eating non-stop while talking with writer.     We reviewed the plan while in-patient and the plan once he leaves.  He says ok ok but I question how much he actually listens or retains.      Carry-over :   Andrew was seen with the primary team yesterday - much reinforcement given about his eating.   He verbalized understanding and his intention to try harder and curb this behaviour - per the BG readings - he did not.   Refusing meds/cares and assessments - see nursing notes.     He continues to overeat and not inform nursing.     Biggest risk of his behavior is escalating his insulin while impatient to meet his eating and discharging him on these huge doses and then he leaves and has no access to food.     We know from a similar plan last time, he did have BG in the 70-80's so will back off his outpatient plan further.   Andrew is aware of this as we discussed in detail.    He does not have Glimeperide - team to order  Aware he needs to manage his DM better we have talked about this consistently and reinforced this many, many times.       Planned Procedures/surgeries: none  D5W-containing solutions/medications: none    Recent Labs   Lab 09/26/23  0151 09/25/23  2238 09/25/23  2059 09/25/23  1928 09/25/23  1802 09/25/23  1339   * 425* 486* 506* 460* 377*         Nutrition:     Orders Placed This Encounter      High Consistent Carb (75 g CHO per Meal) Diet          PTA Regimen:     Lantus 38 unit(s) daily - states he has taking 37 unit(s)   Glimeperide - unk what he was taking regularly - was prescrbed to take 8 mg when eating well and 4 mg when less access to food  Januvia 100 mg daily           Review of Systems:   CC: none - c/o no BM " "x 2 days          Medications:   Steroid planning:  none     Physical Exam:   /78 (BP Location: Right arm, Patient Position: Semi-Post's)   Pulse 90   Temp 98.3  F (36.8  C) (Oral)   Resp 20   Ht 1.651 m (5' 5\")   Wt 72.6 kg (160 lb)   SpO2 99%   BMI 26.63 kg/m      General:   A&O, NAD - yelling at first   HEENT:   Hearing WNL.   Lungs:  unremarkable, no new cough, no SOB  Psych:   Agitated          Data:     Lab Results   Component Value Date    A1C 13.3 08/06/2023    A1C 11.5 04/18/2023    A1C 12.8 12/07/2022    A1C 12.8 11/17/2022    A1C 11.2 02/18/2022    A1C 9.7 11/11/2020    A1C 8.2 09/25/2020    A1C 8.2 08/27/2020    A1C 11.6 02/11/2020    A1C 12.3 01/07/2020        CBC RESULTS: Recent Labs   Lab Test 09/26/23  0831   WBC 6.9   RBC 4.15*   HGB 11.8*   HCT 34.7*   MCV 84   MCH 28.4   MCHC 34.0   RDW 13.3        Recent Labs   Lab Test 09/26/23  0838 09/26/23  0151 09/22/23  0659 09/22/23  0626 09/21/23  0804 09/21/23  0655   NA  --   --   --  136  --  131*   POTASSIUM  --   --   --  3.8  3.8  --  3.8   CHLORIDE  --   --   --  100  --  98   CO2  --   --   --  24  --  25   ANIONGAP  --   --   --  12  --  8   * 329*   < > 448*   < > 430*   BUN  --   --   --  8.0  --  9.0   CR  --   --   --  0.72  --  0.69   LINDA  --   --   --  8.6  --  8.4*    < > = values in this interval not displayed.       Liver Function Studies -   Recent Labs   Lab Test 09/22/23  0626   PROTTOTAL 6.2*   ALBUMIN 3.2*   BILITOTAL 0.4   ALKPHOS 140*   AST 11   ALT 8     Lab Results   Component Value Date    INR 0.91 08/06/2023    INR 1.00 03/28/2023    INR 0.98 02/06/2023    INR 0.95 12/23/2022    INR 0.97 12/17/2022    INR 1.07 12/08/2022    INR 1.44 06/05/2022    INR 0.96 04/29/2022    INR 1.04 01/22/2022    INR 1.00 12/19/2020    INR 1.12 03/07/2020    INR 1.04 02/14/2020    INR 1.03 10/11/2019    INR 1.06 02/05/2018    INR 0.98 01/01/2018    INR 0.91 11/06/2017    INR 1.00 05/26/2017    INR 1.05 12/26/2016    " INR 1.03 11/06/2016    INR 1.14 07/25/2016    INR 1.10 05/10/2016       VIKASH Dunn [facility]  Inpatient Diabetes Management Service  Pager - [phone]  Available on [employer]     To contact Endocrine Diabetes service:   From 7AM-5PM: page inpatient diabetes provider who is following the patient that day (see filed or incomplete progress notes/consult notes).  If uncertain of provider assignment: page job code [phone]. (To page job code in-house dial 3 stars, [phone] then enter number).  For questions or updates AFTER HOURS from 5PM-7AM: page the diabetes job code for on call fellow: [phone]    Please notify inpatient diabetes service if changes are planned to steroids, nutrition, or if procedures are planned requiring prolonged NPO status. Diabetes Management Team job code: [phone]       I spent a total of 35 minutes on the date of the encounter doing prep/post-work, chart review, history and exam, documentation and further activities per the note including lab review, multidisciplinary communication, counseling the patient and/or coordinating care regarding acute hyper/hypoglycemic management, as well as discharge management and planning/communication.  See note for details.        Follow up was done today via virtual/telephone encounter  Start of call 0855  End of call 0901

## 2023-09-26 NOTE — PROGRESS NOTES
Care Management Discharge Note    Discharge Date: 09/26/2023       Discharge Disposition: Shelter    Discharge Services: County Worker, Transportation Services    Discharge DME: None    Discharge Transportation: public transportation    Private pay costs discussed: Not applicable    Does the patient's insurance plan have a 3 day qualifying hospital stay waiver?  No    PAS Confirmation Code: N/A  Patient/family educated on Medicare website which has current facility and service quality ratings: no    Education Provided on the Discharge Plan: Yes  Persons Notified of Discharge Plans: Patient, provider, CHW, nursing staff, SW  Patient/Family in Agreement with the Plan: yes    Handoff Referral Completed: Yes    Additional Information:  Patient will discharge today back to [facility]. CHW arranged discharge transportation back to [facility] through [employer] with a pickup time of 1300.    @1125 Bedside informed SW and CHW that the patient has a urology appointment at the [facility] today at 1245. CHW cancelled transportation and provided patient with bus tokens. SW reported to the bedside nurse that there should be a clinic SW that the patient can also request to speak with to assist with arranging transportation home. Bedside nurse stated she will report that information to the patient.     No other discharge needs identified.     GERONIMO Vera  [address]   Office: [phone]  Pager: [phone]  [email or web address]

## 2023-09-26 NOTE — TELEPHONE ENCOUNTER
MEDICAL RECORDS REQUEST   [facility]  Urology Clinic  [address] [place] [address]  PHONE: [phone]  Fax: [phone]                                                    FUTURE VISIT INFORMATION                                                   Andrew Collier, : 1965 scheduled for future visit at [facility]     APPOINTMENT INFORMATION:  Date: 10/04/2023  Provider:  Cielo Khan PA-C  Reason for Visit/Diagnosis: consult for implant     RECORDS REQUESTED FOR VISIT                                                      NOTES   STATUS/DETAILS   DISCHARGE REPORT from the ER   yes, 2023 -- [facility] ED   MEDICATION LIST   yes   LABS       URINALYSIS (UA)   yes   IMAGES   yes, 2023, 2023, 2023 -- CT ABD PELVIS     2023 -- US TESTICULAR AND SCROTUM      PRE-VISIT CHECKLIST        Joint diagnostic appointment coordinated correctly          (ensure right order & amount of time) Yes   RECORD COLLECTION COMPLETE Yes

## 2023-09-26 NOTE — PROGRESS NOTES
[facility]    Medicine Progress Note - Hospitalist Service, GOLD TEAM 8    Date of Admission:  9/19/2023    Assessment & Plan   57 year old male admitted on 9/19/2023. 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 He presented to the ED with 1 day of obstipation and vomiting concerning for recurrent SBO.     # Vomiting and abdominal pain secondary to recurrent small bowel obstruction likely secondary to prior adhesions, all are present on admission, resolved  Noting day of diarrhea followed by no flatus or BM with associated vomiting and abdominal distention. Mild leukocytosis and thromocytosis are likely stress reaction.  CT with focal area of small bowel dilation in LLQ with multiple points of transition suggestive of SBO secondary to adhesions.  Try to minimize narcotics to help with medical resolution of the obstruction.    Abdominal pain improved.  Gastrografin study was performed which was within normal limits on 9/22.  The patient nasogastric tube was removed accidentally however the patient was able to tolerate clear liquid diet after that as such it was not placed again, appropriately so.    However, the patient had recurrence of abdominal pain that is similar to on admission. CT abdomen and pelvis with oral and intravenous contrast showing mildly dilated bowel with area of focal narrowing with partial/resolving obstruction    -Continue Zofran and Compazine PRN  -Continue lidocaine patch at left lower quadrant to minimize utilization of narcotics  -Continue NSAIDs using ibuprofen as needed  -Continue pain control with oxycodone for moderate pain and intravenous Dilaudid for severe pain  -Continue diet   -Continue laxatives based on GI recommendations  - Consulted dietitian for education on  gastroparesis related diet  -Appreciate the input of general surgery  - Consulted gastroentrology    Controlled hyperglycemia secondary to DM2, all are present on admission  Recent Hgb A1c 13.3%. Endocrine consulted and are concerned for uptitration of insulin in the inpatient setting given the availability of food that the patient consumes while he is in-house while the patient does not have access to as much fluids as outpatient.  As such, increasing dosing of insulin while in-house can lead to discharge on higher dosing of insulin that can be unsafe for the outpatient using that clinical scenario.  -Counseled on moderation of oral intake  -Insulin glargine 35 units once then 38 united every morning   -Insulin NovoLog high insulin resistance scale  -Continue Januvia 100 mg oral daily  - Glimepiride on discharge with detailed recommendations documented in endocrine notes    # Distended urinary bladder likely secondary to BPH with penile prosthesis having air in the reservoir  Noted to have well distended bladder with wall thickening on CT s/o chronic obstruction. Enlarged prostate noted on prior imaging.  Repeat urine analysis without infection.  -Continiue tamsulosin  -Requested evaluation by urology saw the patient and recommended referral to outpatient    # Noncellulitic small skin lesion at lower abdomen and back of the neck, all are present on admission trace erythema surrounding lesion without warmth, pain or fluctuance. Reportedly started as an ingrown hair. Currently does not seem c/w with cellulitis but high risk given his immunosuppression/HIV status.  This is likely secondary to itching in the setting of hyperglycemia  - Swab obtained  - Started doxycyline IV that can be switched to oral for 7 days course on discharge  - Topical mupirocin TID x 5 days    - Consulted with dermatology    #Chronic medical problems  HIV, OLOL.  Unclear who is managing his HIV. Had multiple undetectable viral loads in the  "past. Last ID note found was from 1/2023 and he did not have insurance so had to defer management until insurance obtained again. Patient reported taking his Biktarvy, however recent CD4 535, viral load 19,900 which raises concern for intermittently non-compliant vs unable to obtain his medications (however reports he is getting medications through a provider at [facility]). Needs to re-establish ID for follow up however apparently unable to accept patient at [facility] due to too many no shows. Was discharged with 30 day supply of Biktary during admission in 8/2023.   - Continue Biktarvy   -Asked the patient to make an appointment with the ID clinic at [place] since he feels that he continues to follow-up with them but Will likely need to send with additional supply and potentially need to refer outside our system for ID management     Housing instability  Staying at [facility].   - SW consulted for dispo planning     IV methamphetamine use   Reported ongoing occasional IV meth use during prior admission and cessation was discussed.  The patient would not utilize IV meth for the last 3 months.  The patient declines addiction medicine consult.         Diet: High Consistent Carb (75 g CHO per Meal) Diet    DVT Prophylaxis: Pneumatic Compression Devices  Potts Catheter: Not present  Lines: None     Cardiac Monitoring: None  Code Status: Full Code      Clinically Significant Risk Factors              # Hypoalbuminemia: Lowest albumin = 3 g/dL at 9/21/2023  6:55 AM, will monitor as appropriate           # DMII: A1C = 13.3 % (Ref range: <5.7 %) within past 6 months       # Overweight: Estimated body mass index is 26.63 kg/m  as calculated from the following:    Height as of this encounter: 1.651 m (5' 5\").    Weight as of this encounter: 72.6 kg (160 lb).             Disposition Plan      Expected Discharge Date: 09/26/2023      Destination: shelter  Discharge Comments: if no significant abdominal pain, no or " minimal use of IV narcotics, and able to tolerate diet          Norman Castillo MD  Hospitalist Service, GOLD TEAM 8  [facility]  Securely message with [employer] (more info)  Text page via [email or web address] Paging/Directory   See signed in provider for up to date coverage information  ______________________________________________________________________    Interval History   The patient continues to report diffuse abdominal pain.  No active nausea or vomiting.    Physical Exam   Vital Signs: Temp: 98.9  F (37.2  C) Temp src: Axillary BP: 138/77 Pulse: 101   Resp: 15 SpO2: 99 % O2 Device: None (Room air)    Weight: 160 lbs 0 oz    Constitutional: Awake, alert, cooperative, no apparent distress.  Eyes: Conjunctiva and pupils examined and normal.  HEENT: Moist mucous membranes, normal dentition.  Respiratory: Clear to auscultation bilaterally, no crackles or wheezing.  Cardiovascular: Regular rate and rhythm, normal S1 and S2, and no murmur noted.  GI: Soft, non-distended, non-tender, normal bowel sounds.  Lymph/Hematologic: No anterior cervical or supraclavicular adenopathy.  Skin: No rashes, no cyanosis, no edema.  Musculoskeletal: No joint swelling, erythema or tenderness.  Neurologic: Cranial nerves 2-12 intact, normal strength and sensation.  Psychiatric: Alert, oriented to person, place and time, no obvious anxiety or depression.     Medical Decision Making       45 MINUTES SPENT BY ME on the date of service doing chart review, history, exam, documentation & further activities per the note.      Data         Imaging results reviewed over the past 24 hrs:   No results found for this or any previous visit (from the past 24 hour(s)).

## 2023-09-26 NOTE — PROVIDER NOTIFICATION
Provider notified (name): Kalpesh Boggs  Reason for notification: Pt on high Mag replacement. Mg+ 1.5; needs replacement. No PIV. Is pt still discharging or do you want Mg+ given?    Response from provider: Ok to hold Mg+, pt discharging today.

## 2023-09-26 NOTE — PHARMACY-ADMISSION MEDICATION HISTORY
Pharmacist Admission Medication History    Admission medication history is complete. The information provided in this note is only as accurate as the sources available at the time of the update.    Medication reconciliation/reorder completed by provider prior to medication history? No    Information Source(s): Patient via in-person    Pertinent Information: I asked patient about compliance and he was adamant that he did take his medications everyday for the last few months.     Changes made to PTA medication list:  Added: None  Deleted: None  Changed: None    Allergies reviewed with patient and updates made in EHR: no    Medication History Completed By: JENNIFER MARK RPH 9/25/2023 7:26 PM    Prior to Admission medications    Medication Sig Last Dose Taking? Auth Provider Long Term End Date       Norman Castillo, Norman Robertson, Norman Robertson, Norman Robertson, MD     bictegravir-emtricitabine-tenofovir (BIKTARVY) -25 MG per tablet Take 1 tablet by mouth daily   Roro Gomes CNP     blood glucose (NO BRAND SPECIFIED) lancets standard Use to test blood sugar 1 time daily or as directed.   Anna Dailey MD     blood glucose (NO BRAND SPECIFIED) test strip Use to test blood sugar 1 time daily or as directed.   Anna Dailey MD     buPROPion (WELLBUTRIN XL) 150 MG 24 hr tablet Take 1 tablet (150 mg) by mouth daily   Roro Gomes CNP Yes    famotidine (PEPCID) 40 MG tablet Take 1 tablet (40 mg) by mouth daily   Roro Gomes CNP No    glimepiride (AMARYL) 4 MG tablet Take 2 tablets (8 mg) by mouth every morning (before breakfast)   Roro Gomes CNP Yes    ibuprofen (ADVIL/MOTRIN) 600 MG tablet Take 1 tablet (600 mg) by mouth every 6 hours as needed for moderate pain (4-6)   Esther Mauro MD No    insulin glargine (LANTUS PEN) 100 UNIT/ML pen Inject 38 Units Subcutaneous every morning   Eliseo  Roro Brian CNP Yes    insulin pen needle (32G X 4 MM) 32G X 4 MM miscellaneous Use 4-5 pen needles daily or as directed.   Roro Gomes CNP No    sitagliptin (JANUVIA) 100 MG tablet Take 1 tablet (100 mg) by mouth daily   Roro Gomes CNP Yes        Parish Lopez, DO Yes 5/29/22

## 2023-09-26 NOTE — PROVIDER NOTIFICATION
Provider notified (name): MD Corona  Reason for notification: refuse IV ABX   Recommendation/request given to provider:  refuse after multiple attempts, not easily redirected.   Response from provider: no response yet.

## 2023-09-26 NOTE — DISCHARGE SUMMARY
"[facility]  Hospitalist Discharge Summary      Date of Admission:  9/19/2023  Date of Discharge:  9/26/2023  Discharging Provider: Ketty Sidhu DO  Discharge Service: Hospitalist Service, GOLD TEAM 8    Clinically Significant Risk Factors     # DMII: A1C = 13.3 % (Ref range: <5.7 %) within past 6 months  # Overweight: Estimated body mass index is 26.63 kg/m  as calculated from the following:    Height as of this encounter: 1.651 m (5' 5\").    Weight as of this encounter: 72.6 kg (160 lb).       Follow-ups Needed After Discharge   - PCP follow up in 2-3 weeks for hospital follow up   - Endocrinology follow up to be coordinated via consult service    Unresulted Labs Ordered in the Past 30 Days of this Admission       Date and Time Order Name Status Description    9/25/2023  3:32 PM Wound Aerobic Bacterial Culture Routine with Gram Stain Preliminary         These results will be followed up by PCP.     Discharge Disposition   Discharged to home - [facility]   Condition at discharge: Stable    Hospital Course   57 year old male admitted on 9/19/2023. 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 He presented to the ED with 1 day of obstipation and vomiting concerning for recurrent SBO, course complicated by hyperglycemia in the setting of improved oral intake.      # Vomiting and abdominal pain secondary to recurrent small bowel obstruction likely secondary to prior adhesions, all are present on admission, resolved   CT with focal area of small bowel dilation in LLQ with multiple points of transition suggestive of SBO secondary to adhesions requiring NG decompression. Minimized narcotics to help with medical resolution of the obstruction. Gastrografin study was performed which was within " "normal limits on 9/22. Recurrent abd pain on 9/24, repeat CT with ongoing dilation of small bowel in the LLQ but with oral contrast beyond focally dilated segment suggesting low-grade partial vs resolving obstruction. He is tolerating PO without recurrent abdominal discomfort on day of discharge.   - GI and general surgery consulted while inpatient  -Not requiring opioids on discharge   -Continue bowel regimen; prescribed Miralax on discharge  -Continue diet   -Dietician consulted while inpatient for education on gastroparesis related diet     #Hyperglycemia secondary to DM2  #Insulin-dependent diabetes mellitus, poorly controlled   Recent Hgb A1c 13.3%. Endocrine consulted and are concerned for uptitration of insulin in the inpatient setting given the availability of food that the patient consumes while he is in-house while the patient does not have access to as much fluids as outpatient.  As such, increasing dosing of insulin while in-house can lead to discharge on higher dosing of insulin that can be unsafe for the outpatient using that clinical scenario.  -Counseled on moderation of oral intake  -Limited ability to utilize insulin as an outpatient in the setting of housing instability  -Endocrinology consulted with recommendations below:    -Lantus 32 units daily    - Continue Januvia 100 mg oral daily  - \"Start Glimepiride 8 mg every AM when access to food and eating well and only 2 mg or 4 mg in AM if less access to food to prevent low blood sugars.  If no food, do not take at all\"     # Distended urinary bladder likely secondary to BPH with penile prosthesis having air in the reservoir  Noted to have well distended bladder with wall thickening on CT s/o chronic obstruction. Enlarged prostate noted on prior imaging.  Repeat urine analysis without infection.  -Continiue tamsulosin  -Requested evaluation by urology saw the patient and recommended referral to outpatient     # Noncellulitic small skin lesion at " lower abdomen and back of the neck, all are present on admission trace erythema surrounding lesion without warmth, pain or fluctuance. Reportedly started as an ingrown hair. Currently does not seem c/w with cellulitis but high risk given his immunosuppression/HIV status.  This is likely secondary to itching in the setting of hyperglycemia  - Swab obtained  - Dermatology consulted with recommendation for 7 day course of doxycycline - transition to oral on discharge   - Topical mupirocin TID x 5 days       #Chronic medical problems  HIV, OLOL.  Unclear who is managing his HIV. Had multiple undetectable viral loads in the past. Last ID note found was from 1/2023 and he did not have insurance so had to defer management until insurance obtained again. Patient reported taking his Biktarvy, however recent CD4 535, viral load 19,900 which raises concern for intermittently non-compliant vs unable to obtain his medications (however reports he is getting medications through a provider at [facility]). Needs to re-establish ID for follow up however apparently unable to accept patient at [facility] due to too many no shows. Was discharged with 30 day supply of Biktary during admission in 8/2023.   - Continue Biktarvy   -Asked the patient to make an appointment with the ID clinic at [place] since he feels that he continues to follow-up with them but Will likely need to send with additional supply and potentially need to refer outside our system for ID management      Housing instability  Staying at [facility] shelter.   - SW consulted for dispo planning and he is discharged back to shelter     IV methamphetamine use   Reported ongoing occasional IV meth use during prior admission and cessation was discussed.  The patient would not utilize IV meth for the last 3 months.  The patient declines addiction medicine consult.    Consultations This Hospital Stay   UROLOGY IP CONSULT  CARE MANAGEMENT / SOCIAL WORK IP CONSULT  WOUND  OSTOMY CONTINENCE NURSE  IP CONSULT  PHARMACY IP CONSULT  NURSING TO CONSULT FOR VASCULAR ACCESS CARE IP CONSULT  ENDOCRINE DIABETES ADULT IP CONSULT  GI LUMINAL ADULT IP CONSULT  DERMATOLOGY IP CONSULT  NURSING TO CONSULT FOR VASCULAR ACCESS CARE IP CONSULT  NUTRITION SERVICES ADULT IP CONSULT    Code Status   Full Code    Time Spent on this Encounter   I, Ketty Sidhu DO, personally saw the patient today and spent greater than 30 minutes discharging this patient.       Ketty Sidhu DO  [facility] 7C MED SURG  [address]  [place] [address]  Phone: [phone]  ______________________________________________________________________    Physical Exam   Vital Signs: Temp: 98.3  F (36.8  C) Temp src: Oral BP: 131/78 Pulse: 90   Resp: 20 SpO2: 99 % O2 Device: None (Room air)    Weight: 160 lbs 0 oz  General: well developed, awake, alert, no acute distress. Resting comfortably in bed eating crackers, no acute distress   HEENT: atraumatic, MMM  CV: RRR, no m/r/g. Extremities are warm and well perfused.   LUNGS: CTAB, no w/r/c.  ABD: Soft, NT/ND, NBS, no masses or organomegaly.  SKIN: Warm, well perfused.   MSK: No deformities. No edema.  NEURO: Ambulating with no limitations. No focal deficits.  Psych: emotionally labile but redirectable       Primary Care Physician   Chevy Reynoso    Discharge Orders       Significant Results and Procedures   Most Recent 3 CBC's:  Recent Labs   Lab Test 09/26/23  0831 09/22/23  0626 09/21/23  0655   WBC 6.9 7.6 7.4   HGB 11.8* 11.5* 11.9*   MCV 84 86 84    323 334     Most Recent 3 BMP's:  Recent Labs   Lab Test 09/26/23  0838 09/26/23  0831 09/26/23  0151 09/22/23  0659 09/22/23  0626 09/21/23  0804 09/21/23  0655   NA  --  132*  --   --  136  --  131*   POTASSIUM  --  3.7  --   --  3.8  3.8  --  3.8   CHLORIDE  --  98  --   --  100  --  98   CO2  --  22  --   --  24  --  25   BUN  --  13.6  --   --  8.0  --  9.0   CR  --  0.74  --   --  0.72   --  0.69   ANIONGAP  --  12  --   --  12  --  8   LINDA  --  9.0  --   --  8.6  --  8.4*   * 404* 329*   < > 448*   < > 430*    < > = values in this interval not displayed.   ,   Results for orders placed or performed during the hospital encounter of 09/19/23   CT Abdomen Pelvis w Contrast    Narrative    EXAMINATION: CT ABDOMEN PELVIS W CONTRAST, 9/19/2023 5:40 PM    INDICATION: pain, prior obstructions    COMPARISON STUDY:    TECHNIQUE: CT scan of the abdomen and pelvis was performed on  multidetector CT scanner using volumetric acquisition technique and  images were reconstructed in multiple planes with variable thickness  and reviewed on dedicated workstations.     CONTRAST: iopamidol (ISOVUE-370) solution 99 mL injected IV without  oral contrast    CT scan radiation dose is optimized to minimum requisite dose using  automated dose modulation techniques.    FINDINGS:    Lower thorax: Normal.    Liver: No mass. Focal fatty change/perfusion along the falciform  ligament. No intrahepatic biliary ductal dilation.    Biliary System: Cholelithiasis with no evidence of acute  cholecystitis. No extrahepatic biliary ductal dilation.    Pancreas: No mass or pancreatic ductal dilation.    Adrenal glands: No mass or nodules    Spleen: Normal.    Kidneys: No obstructing calculus or hydronephrosis. Slight hypodense  regions in the mid right kidney similar to prior study, for example in  the kidney, series 4 image 165, possibly perfusional versus related to  patient's anatomy as this has a similar appearance on 8/6/2023 study.    Gastrointestinal tract: The appendix is indistinct. Focal area of  small bowel dilation in the left lower quadrant with visualized points  of transition in the left lower quadrant (series 6 image 26, 28, and  34). Unremarkable large bowel.    Mesentery/peritoneum/retroperitoneum: No mass. No free fluid or air.    Lymph nodes: No significant intra-abdominal lymphadenopathy. Bilateral  prominent  inguinal lymph nodes with fatty helen likely reactive,  unchanged from prior.    Vasculature: Patent major abdominal vasculature.    Pelvis: Well-distended urinary bladder with bladder wall thickening.   Prostate is enlarged. Penile pump. The reservoir in the pelvis is  collapsed and contains scattered foci of air, unchanged from prior  however this was fluid-filled 8/7/2023 CT study.    Osseous structures: No aggressive or acute osseous lesion.      Soft tissues: Within normal limits.      Impression    IMPRESSION:   1. Focal area of small bowel dilation in the left lower quadrant with  multiple points of transition, pattern suggestive of small bowel  obstruction secondary to adhesions.  2. Well-distended urinary bladder with bladder wall thickening,  suggestive of chronic obstruction likely secondary to benign prostatic  hyperplasia given substantially enlarged prostate.  3. The penile pump/prosthesis remains in place however the reservoir  in the pelvis is now collapsed and contains foci of air. There is also  air within the tubing. This is new from CT 8/6/2023 but unchanged from  recent CT 9/12/2023. Correlation with any urologic intervention.    I have personally reviewed the examination and initial interpretation  and I agree with the findings.    APRIL ELIZONDO MD         SYSTEM ID:  [id number]   XR Abdomen Port 1 View    Narrative    EXAM: XR ABDOMEN PORT 1 VIEW  LOCATION: [facility]  DATE: 9/19/2023    INDICATION: Verify nasogastric tube placement.  COMPARISON: CT abdomen pelvis 09/19/2023.      Impression    IMPRESSION:    Gastric drainage tube tip is in the proximal stomach. Side-port is likely in the distal esophagus just above the gastroesophageal junction. Recommend advancement of the tube by approximately 4-5 cm to ensure adequate drainage.   XR Abdomen Port 1 View    Narrative    EXAM: XR ABDOMEN PORT 1 VIEW  LOCATION: [facility]  DATE: 9/19/2023    INDICATION: verify NG placement  COMPARISON: 09/19/2023      Impression    IMPRESSION: Nasogastric tube has been advanced with the tip projecting over the gastric fundus and the side port Potts within the stomach which is gas-filled but not overtly distended.   XR Gastrografin Challenge    Narrative    Examination: Gastrografin challenge 9/20/2023 6:49 PM.    History: Small bowel obstruction    Comparison: X-ray and CT 9/19/2023    Technique: 120 mL Gastrografin was given by mouth.    Findings: Gastric tube with tip and sidehole projecting in the  stomach. Gastrografin is visualized throughout the colon. Prominent  gas-filled small bowel in the left abdomen measuring up to 4.4 cm. No  appreciable pneumatosis      Impression    Impression: Negative Gastrografin challenge with Gastrografin  visualized throughout the colon.    I have personally reviewed the examination and initial interpretation  and I agree with the findings.    MATIAS LIZ MD         SYSTEM ID:  [id number]   XR Abdomen Port 1 View    Narrative    EXAM: XR ABDOMEN PORT 1 VIEW  9/23/2023 1:03 PM     HISTORY:  concerns for recurrent intestinal obstruction with abdominal  pain and vomiting       TECHNIQUE: 2 frontal radiographs of the abdomen and pelvis.    COMPARISON:  9/20/2023    FINDINGS:   2 AP portable supine views of the abdomen and pelvis. Inflatable  penile prosthesis.    There is a large stomach bubble. Small amount of gas is present in the  rectum..No pneumatosis, portal venous gas. No osseous abnormality is      Impression    IMPRESSION:   Prominent gastric distention without additional findings of  obstruction..     I have personally reviewed the examination and initial interpretation  and I agree with the findings.    ASH KAY DO         SYSTEM ID:  [id number]   CT Abdomen Pelvis w Contrast    Narrative    EXAMINATION: CT ABDOMEN PELVIS W CONTRAST, 9/24/2023 2:08 PM    TECHNIQUE:  Helical  CT images from the thoracic inlet through the  symphysis pubis were obtained with contrast. Contrast dose: 99ml  Isovue 370    COMPARISON:     Abdomen 9/23/2023, CT abdomen 9/19/2023.    HISTORY: Pls use oral contrast also based on discussion with  radiology, nausea, vomiting and abdominal pain, admitted with SBO    FINDINGS:    Chest:  Mediastinum: The visualized lower mediastinum is unremarkable. Heart  size is within normal limits. No pericardial effusion or pericardial  thickening. No mediastinal masses or adenopathy within the visualized  lower mediastinum.    Lungs: The visualized lung bases are unremarkable. No focal  consolidations, or suspicious pulmonary nodules. No pneumothorax or  pleural effusion. Trace atelectasis in the bases.    Abdomen and pelvis:  Liver: No focal hepatic masses. Similar-appearing focal fat along the  falciform ligament. No intrahepatic biliary ductal dilation.    Biliary System: No extrahepatic biliary ductal dilation. The  gallbladder is unremarkable, no cholelithiasis, no surrounding  inflammatory changes.    Pancreas: No focal hypoattenuating or enhancing pancreatic masses. No  pancreatic ductal dilation. There is diffuse fatty atrophy of the  pancreas.    Adrenal glands: Unremarkable, no masses, no focal thickening.    Spleen: No focal lesions, nonenlarged.    Kidneys: Normal cortical medullary enhancement of the bilateral  kidneys. No masses, hypodensities, or renal cysts. No renal stones or  urinary tract dilations. No hydronephrosis.    Gastrointestinal tract: Again seen is a focally dilated segment of  small bowel within the left lower quadrant, similar in appearance to  prior, however there is oral contrast seen distal to this to the level  of the cecum at the ileocecal valve, suggestive of patency. The  appendix is unremarkable. There are no dilated segments of large  bowel. No mural thickening    Mesentery/peritoneum/retroperitoneum: No masses, fluid collections,  or  free intra-abdominal air.    Lymph nodes: No concerning intra-abdominal adenopathy.    Vasculature: The major abdominal vasculature is patent with normal  distribution. The abdominal aorta is nonaneurysmal.    Pelvis: Urinary bladder similar in appearance with mild wall  thickening. No distal ureteral dilation. Marked prostatomegaly. Penile  prosthesis/implants with similar appearance including deflated  reservoir, with air seen within the lines and tubing, similar to  prior.    Bones and soft tissues: No high-grade osseous lesions. Minimal  degenerative changes throughout the visualized thoracal lumbar spine.  Soft tissues are unremarkable.      Impression    IMPRESSION:   1. Similar-appearing mildly dilated segment of small bowel within the  left lower quadrant, however oral contrast is seen distal to this  segment to the ileocecal valve and in the cecum, suggestive of patency  beyond the focally dilated segment. This suggests low-grade/ partial  or resolving obstruction.   2. Similar-appearing thickening of the bladder wall, and associated  prostatomegaly suggestive of chronic outlet obstruction.   3. Unchanged appearance of penile prosthesis/implant with decompressed  reservoir and foci of air seen within the lines.      I have personally reviewed the examination and initial interpretation  and I agree with the findings.    ASH KAY DO         SYSTEM ID:  [id number]     *Note: Due to a large number of results and/or encounters for the requested time period, some results have not been displayed. A complete set of results can be found in Results Review.       Discharge Medications   Discharge Medication List as of 9/26/2023 11:10 AM        START taking these medications    Details   alum & mag hydroxide-simethicone (MAALOX) 200-200-20 MG/5ML SUSP suspension Take 15 mLs by mouth 3 times daily as needed for indigestion, Disp-10 mL, R-0, E-Prescribe      doxycycline hyclate (VIBRAMYCIN) 100 MG capsule Take 1  capsule (100 mg) by mouth every 12 hours, Disp-14 capsule, R-0, E-Prescribe      mupirocin (BACTROBAN) 2 % external ointment Apply topically 3 times dailyDisp-10 g, [id number]      polyethylene glycol (MIRALAX) 17 GM/Dose powder Take 17 g by mouth 2 times daily, Disp-510 g, R-0, E-Prescribe      tamsulosin (FLOMAX) 0.4 MG capsule Take 1 capsule (0.4 mg) by mouth daily, Disp-30 capsule, R-0, E-Prescribe           CONTINUE these medications which have CHANGED    Details   insulin glargine (LANTUS PEN) 100 UNIT/ML pen Inject 32 Units Subcutaneous every morning, Disp-45 mL, R-1, E-PrescribeIf Lantus is not covered by insurance, may substitute Basaglar or Semglee or other insulin glargine product per insurance preference at same dose and frequency.             CONTINUE these medications which have NOT CHANGED    Details   bictegravir-emtricitabine-tenofovir (BIKTARVY) -25 MG per tablet Take 1 tablet by mouth daily, Disp-30 tablet, R-2, E-Prescribe      blood glucose (NO BRAND SPECIFIED) lancets standard Use to test blood sugar 1 time daily or as directed.Disp-100 lancet, [id number]      blood glucose (NO BRAND SPECIFIED) test strip Use to test blood sugar 1 time daily or as directed., Disp-100 strip, R-4, E-Prescribe      buPROPion (WELLBUTRIN XL) 150 MG 24 hr tablet Take 1 tablet (150 mg) by mouth daily, Disp-30 tablet, R-0, E-Prescribe      famotidine (PEPCID) 40 MG tablet Take 1 tablet (40 mg) by mouth daily, Disp-30 tablet, R-2, E-Prescribe      glimepiride (AMARYL) 4 MG tablet Take 2 tablets (8 mg) by mouth every morning (before breakfast), Disp-180 tablet, R-2, E-Prescribe      ibuprofen (ADVIL/MOTRIN) 600 MG tablet Take 1 tablet (600 mg) by mouth every 6 hours as needed for moderate pain (4-6), Disp-90 tablet, R-0, E-Prescribe      insulin pen needle (32G X 4 MM) 32G X 4 MM miscellaneous Use 4-5 pen needles daily or as directed.Disp-100 each, [id number]      sitagliptin (JANUVIA) 100 MG tablet  "Take 1 tablet (100 mg) by mouth daily, Disp-30 tablet, R-2, E-Prescribe           STOP taking these medications       Alcohol Swabs PADS Comments:   Reason for Stopping:         blood glucose monitoring (NO BRAND SPECIFIED) meter device kit Comments:   Reason for Stopping:         blood glucose monitoring (NO BRAND SPECIFIED) meter device kit Comments:   Reason for Stopping:         Sharps Container MISC Comments:   Reason for Stopping:             Allergies   Allergies   Allergen Reactions    Fentanyl Blisters     Per pt, after taking this medication had blisters develope    Tylenol [Acetaminophen] Itching    Dulaglutide Rash    Insulin Lispro Rash     Patient reported    Penicillin V Other (See Comments) and Rash     Diffuse maculopapular rash + feels \"high\", per pt.      "

## 2023-09-26 NOTE — PLAN OF CARE
"Cares 0700 to 1300    /78 (BP Location: Right arm, Patient Position: Semi-Post's)   Pulse 90   Temp 98.3  F (36.8  C) (Oral)   Resp 20   Ht 1.651 m (5' 5\")   Wt 72.6 kg (160 lb)   SpO2 99%   BMI 26.63 kg/m      Discharge to: Homeless shelter  Transportation: Pt walked out of unit.   Time: 1220  Prescriptions: Pt did not  meds as of 1310.   Belongings: Packed by pt.   Access: No PIV in place.   Care plan and education discontinued:  Yes  Paperwork: AVS discussed with pt.     Transport had been set up @1300 to take pt to homeless shelter via taxi, pt was made aware that our staff would take him down to Discharge pharmacy @1230 but pt left unit on his own. Pt also did not  his discharge meds. Pt had urology appointment at [facility] clinic in [place] at 1245 that he wanted to get to. Pt most likely took shuttle over to [place] and will come back for taxi ride to homeless shelter. Discharge pharmacy notified to call nurse when pt picks up his meds. Pt does not have a personal cell phone to reach at.      Goal Outcome Evaluation:    Plan of Care Reviewed With: patient    Overall Patient Progress: improvingOverall Patient Progress: improving    Outcome Evaluation: Pt ready to discharge.      "

## 2023-09-26 NOTE — PROVIDER NOTIFICATION
Gold 8 paged notifying of pt's  that did not resolve with 10 units of scheduled sliding scale insulin.  BG recheck is 506.  MD says to recheck BG in 1 hour.

## 2023-09-26 NOTE — PROGRESS NOTES
Care Management Follow Up     CHW ordered Taxi ride through Care Management accommodations with [employer] [phone]. CHW spoke with pt to confirm phone number and home address to schedule ride. CHW arranged ride for 1:00 and the job number is the following [id number] . CHW alerted Bedside and pt of discharge ride along with stating the pt should be downstairs 10 minutes prior to discharge ride.     11:32: CHW was alerted by bedside nurse that the pt has a appointment at the [facility] @ 12:45. CHW canceled Taxi Ride and pt will take the shuttle to [place]. CHW will provide bus tokens to the pt as requested by bedside nurse.     Jose Ramon Raymundo   Inpatient Community Health Worker and [place]   [facility] 7C & 7D

## 2023-09-26 NOTE — TELEPHONE ENCOUNTER
Patient has no showed last 8 infectious disease appointments with us. He came in for a nurse visit on 6/28 and left very upset and stated he would follow up with [facility]. Patient will not be scheduled in this clinic for any further appointments. Unable to reiterate this to patient as  he does not have a phone and we are unable to email patients. Address on file is a shelter where patient may no longer be.      ----- Message from Reema Blas sent at 9/26/2023 12:22 PM CDT -----  Regarding: Follow Up  Hello,    This is a former Dr. Prieto patient, he would like to be seen by a male provider going forward if possible. He's also listed as requiring approval to schedule due to no shows. Please reach out to him via email, he says he does not have a phone.     Thank you,  Reema

## 2023-09-26 NOTE — PROGRESS NOTES
"Pt came back to unit at 1316, saying \"Taxi never showed up.\" Pt had discharge meds in a bag with him. Writer gave bus tokens to pt.   "

## 2023-09-26 NOTE — PROVIDER NOTIFICATION
Provider notified (name):Lou Canela PA-C   Reason for notification: BS elevated   Recommendation/request given to provider: sliding scale insulin administer with snack correction   Response from provider: continues to monitor, per MD

## 2023-09-26 NOTE — PLAN OF CARE
"PT is A&O X4,  had some behaviors issues on this shift, did c/o pain but refused PRN medications after multiple attempts. PT continues to have elevated  then decrease to 425, after administration of  sliding scale insulin and snack correction insulin administer. Updated MD, who stated to monitor insulin, and BS recheck at 0200, BS-329, sliding scale insulin administer.  PT refused IV ABX, requested that PIV should be remove, continues to have multiple wounds. PT also refused lab this morning, stating to writer \" he doesn't like to be poke twice, was told the importance of lab, but continue to refused. PT had some snack this morning, snack correction insulin administer 25 units.                         "

## 2023-09-26 NOTE — PLAN OF CARE
Patient alert and oriented x4, calm, pleasant, non compliant with diabetic diet. Able to ambulate in hallway with steady gait and good balance. Patient hoarding food and eating it in room. Hyperglycemia noted throughout the shift and MD aware. Sliding scale  and carb coverage given before meals. Was seen and rounded by primary team in the morning. [facility] clinic staff called the station and 7C informed personnel that patient was at their facility  after lunch. Patient came back to unit after 1 hr and told RN that he went to clinic to make a urology appointment. MD notified. He was then rounded by team to address his diabetes treatment, wounds and urologic concerns. Wound swab specimen obtained by dermatology and was sent to lab for culture and gram stain. RN unable to track most of this intake in between meals and he continues to be hyperglycemic. MD cross cover aware. Resting in bed as of this time. No other calls made.  P: Continue to monitor blood glucose and notify MD of results. Possible discharge tomorrow.

## 2023-09-26 NOTE — PROGRESS NOTES
Nutrition Brief - Consult received for education on gastroparesis diet    Chart reviewed: patient with history of Gastroparesis and uncontrolled DM2 ( On insulin), A1C: 13.3 (H) on 8/6/13.   - Gastric emptying study 3/1/16: Per MD note  showed a normal gastric emptying.   - Labs noted, meds reviewed.       Interventions:  Visited with patient today. Patient reports significant nausea with food intake. Having bowel movement every so often. Patient says he is homeless and does not have consistent food availabilities. Patient however was interested in Gastroparesis diet education. He is also requested a review of carb counting diet for diabetes.    Reviewed carb counting diet along with Gastroparesis:   Provided instruction on carb counting diet and also  low fat/low fiber diet for Gastroparesis.   Reviewed Food groups,  Carbohydrate sources and serving sizes. Discussed recommendations for carb intakes at meals. Encouraged ~ 45-60 gm's with each meals.  Recommended space intake 3-4 hours apart. Discussed use of non-carb snacks between meals if desired. Discussed gastroparesis managements:       - Chew food thoroughly      - Eat well cooked fruits and vegetables rather than raw fruits and vegetables      - Avoid fibrous fruits and vegetables such as broccoli and oranges, as the may cause bezoars      - Low fat foods are preferable, small amount of fat if tolerated      Patient verbalized understanding of information provided.    Romy Chaves RD/DORA  Pager [phone]

## 2023-09-28 LAB
BACTERIA SPEC CULT: ABNORMAL
GRAM STAIN RESULT: ABNORMAL
GRAM STAIN RESULT: ABNORMAL

## 2023-09-29 ENCOUNTER — PRE VISIT (OUTPATIENT)
Dept: UROLOGY | Facility: CLINIC | Age: 58
End: 2023-09-29
Payer: COMMERCIAL

## 2023-09-29 NOTE — TELEPHONE ENCOUNTER
Reason for visit: consult for  implant    Dx/Hx/Sx: scrotal pain per Cielo Khan    Records/imaging/labs/orders: in epic    At Rooming: virtual visit

## 2023-10-04 ENCOUNTER — PRE VISIT (OUTPATIENT)
Dept: UROLOGY | Facility: CLINIC | Age: 58
End: 2023-10-04

## 2023-10-10 ENCOUNTER — PATIENT OUTREACH (OUTPATIENT)
Dept: SURGERY | Facility: CLINIC | Age: 58
End: 2023-10-10

## 2023-10-10 NOTE — TELEPHONE ENCOUNTER
MEDICAL RECORDS REQUEST   [facility]  [facility]  [address] [place] [address]  PHONE: [phone]  Fax: [phone]                                                    FUTURE VISIT INFORMATION                                                   Andrew Collier, : 1965 scheduled for future visit at [facility]     APPOINTMENT INFORMATION:  Date: 10/04/2023  Provider:  Cielo Khan PA-C  Reason for Visit/Diagnosis: consult for implant     RECORDS REQUESTED FOR VISIT                                                      NOTES   STATUS/DETAILS   DISCHARGE REPORT from the ER   yes, 2023 -- [facility] ED   MEDICATION LIST   yes   LABS       URINALYSIS (UA)   yes   IMAGES   yes, 2023, 2023, 2023 -- CT ABD PELVIS     2023 -- US TESTICULAR AND SCROTUM      PRE-VISIT CHECKLIST        Joint diagnostic appointment coordinated correctly          (ensure right order & amount of time) Yes   RECORD COLLECTION COMPLETE Yes

## 2023-10-10 NOTE — PROGRESS NOTES
Patient Telephone Reminder Call    Date of call:  10/10/23  Phone numbers:  Home number on file [phone] (home)    Reached patient/confirmed appointment:  No, number out of service  Appointment with:   Dr. Prince Dowling  Reason for visit:  SBO  Remind patient that this visit is a consultation only, NO procedure:  No  Can this visit be changed to a video visit:  No

## 2023-10-11 ENCOUNTER — TELEPHONE (OUTPATIENT)
Dept: FAMILY MEDICINE | Facility: CLINIC | Age: 58
End: 2023-10-11

## 2023-10-11 ENCOUNTER — PRE VISIT (OUTPATIENT)
Dept: SURGERY | Facility: CLINIC | Age: 58
End: 2023-10-11

## 2023-10-16 ENCOUNTER — TELEPHONE (OUTPATIENT)
Dept: FAMILY MEDICINE | Facility: CLINIC | Age: 58
End: 2023-10-16
Payer: COMMERCIAL

## 2023-10-16 ENCOUNTER — PRE VISIT (OUTPATIENT)
Dept: UROLOGY | Facility: CLINIC | Age: 58
End: 2023-10-16
Payer: COMMERCIAL

## 2023-10-16 NOTE — TELEPHONE ENCOUNTER
"Reason for visit: consult for  \"Implant\"    Dx/Hx/Sx: scrotal pain per Cielo Khan     Records/imaging/labs/orders: in epic    At Rooming: virtual visit   "

## 2023-10-18 ENCOUNTER — PRE VISIT (OUTPATIENT)
Dept: UROLOGY | Facility: CLINIC | Age: 58
End: 2023-10-18

## 2023-11-01 ENCOUNTER — HOSPITAL ENCOUNTER (EMERGENCY)
Facility: CLINIC | Age: 58
Discharge: HOME | End: 2023-11-01
Attending: EMERGENCY MEDICINE | Admitting: EMERGENCY MEDICINE
Payer: COMMERCIAL

## 2023-11-01 VITALS
WEIGHT: 160.05 LBS | HEIGHT: 65 IN | SYSTOLIC BLOOD PRESSURE: 106 MMHG | DIASTOLIC BLOOD PRESSURE: 68 MMHG | OXYGEN SATURATION: 96 % | BODY MASS INDEX: 26.67 KG/M2 | RESPIRATION RATE: 12 BRPM | TEMPERATURE: 98 F | HEART RATE: 90 BPM

## 2023-11-01 DIAGNOSIS — F19.90 POLYSUBSTANCE USE DISORDER: ICD-10-CM

## 2023-11-01 DIAGNOSIS — K22.6 MALLORY-WEISS TEAR: ICD-10-CM

## 2023-11-01 LAB
ABO/RH(D): NORMAL
ALBUMIN SERPL BCG-MCNC: 3.8 G/DL (ref 3.5–5.2)
ALP SERPL-CCNC: 113 U/L (ref 40–129)
ALT SERPL W P-5'-P-CCNC: 16 U/L (ref 0–70)
ANION GAP SERPL CALCULATED.3IONS-SCNC: 9 MMOL/L (ref 7–15)
ANTIBODY SCREEN: NEGATIVE
APTT PPP: 30 SECONDS (ref 22–38)
AST SERPL W P-5'-P-CCNC: 25 U/L (ref 0–45)
BASOPHILS # BLD AUTO: 0 10E3/UL (ref 0–0.2)
BASOPHILS NFR BLD AUTO: 0 %
BILIRUB SERPL-MCNC: 0.8 MG/DL
BUN SERPL-MCNC: 21.4 MG/DL (ref 8–23)
CALCIUM SERPL-MCNC: 8.8 MG/DL (ref 8.6–10)
CHLORIDE SERPL-SCNC: 99 MMOL/L (ref 98–107)
CREAT SERPL-MCNC: 0.66 MG/DL (ref 0.67–1.17)
DEPRECATED HCO3 PLAS-SCNC: 24 MMOL/L (ref 22–29)
EGFRCR SERPLBLD CKD-EPI 2021: >90 ML/MIN/1.73M2
EOSINOPHIL # BLD AUTO: 0.1 10E3/UL (ref 0–0.7)
EOSINOPHIL NFR BLD AUTO: 1 %
ERYTHROCYTE [DISTWIDTH] IN BLOOD BY AUTOMATED COUNT: 13.7 % (ref 10–15)
GLUCOSE SERPL-MCNC: 295 MG/DL (ref 70–99)
HCT VFR BLD AUTO: 34 % (ref 40–53)
HGB BLD-MCNC: 11.3 G/DL (ref 13.3–17.7)
HOLD SPECIMEN: NORMAL
IMM GRANULOCYTES # BLD: 0 10E3/UL
IMM GRANULOCYTES NFR BLD: 1 %
INR PPP: 1.13 (ref 0.85–1.15)
LYMPHOCYTES # BLD AUTO: 2 10E3/UL (ref 0.8–5.3)
LYMPHOCYTES NFR BLD AUTO: 25 %
MCH RBC QN AUTO: 28.6 PG (ref 26.5–33)
MCHC RBC AUTO-ENTMCNC: 33.2 G/DL (ref 31.5–36.5)
MCV RBC AUTO: 86 FL (ref 78–100)
MONOCYTES # BLD AUTO: 0.7 10E3/UL (ref 0–1.3)
MONOCYTES NFR BLD AUTO: 8 %
NEUTROPHILS # BLD AUTO: 5.3 10E3/UL (ref 1.6–8.3)
NEUTROPHILS NFR BLD AUTO: 65 %
NRBC # BLD AUTO: 0 10E3/UL
NRBC BLD AUTO-RTO: 0 /100
PLATELET # BLD AUTO: 319 10E3/UL (ref 150–450)
POTASSIUM SERPL-SCNC: 3.3 MMOL/L (ref 3.4–5.3)
PROT SERPL-MCNC: 6.6 G/DL (ref 6.4–8.3)
RBC # BLD AUTO: 3.95 10E6/UL (ref 4.4–5.9)
SODIUM SERPL-SCNC: 132 MMOL/L (ref 135–145)
SPECIMEN EXPIRATION DATE: NORMAL
TROPONIN T SERPL HS-MCNC: 38 NG/L
TROPONIN T SERPL HS-MCNC: 41 NG/L
WBC # BLD AUTO: 8.2 10E3/UL (ref 4–11)

## 2023-11-01 PROCEDURE — 96361 HYDRATE IV INFUSION ADD-ON: CPT | Performed by: EMERGENCY MEDICINE

## 2023-11-01 PROCEDURE — 85730 THROMBOPLASTIN TIME PARTIAL: CPT | Performed by: EMERGENCY MEDICINE

## 2023-11-01 PROCEDURE — 93005 ELECTROCARDIOGRAM TRACING: CPT | Performed by: EMERGENCY MEDICINE

## 2023-11-01 PROCEDURE — 99284 EMERGENCY DEPT VISIT MOD MDM: CPT | Mod: 25 | Performed by: EMERGENCY MEDICINE

## 2023-11-01 PROCEDURE — 85610 PROTHROMBIN TIME: CPT | Performed by: EMERGENCY MEDICINE

## 2023-11-01 PROCEDURE — 84484 ASSAY OF TROPONIN QUANT: CPT | Performed by: EMERGENCY MEDICINE

## 2023-11-01 PROCEDURE — 86850 RBC ANTIBODY SCREEN: CPT | Performed by: EMERGENCY MEDICINE

## 2023-11-01 PROCEDURE — 93010 ELECTROCARDIOGRAM REPORT: CPT | Performed by: EMERGENCY MEDICINE

## 2023-11-01 PROCEDURE — C9113 INJ PANTOPRAZOLE SODIUM, VIA: HCPCS | Mod: JZ | Performed by: EMERGENCY MEDICINE

## 2023-11-01 PROCEDURE — 250N000011 HC RX IP 250 OP 636: Mod: JZ | Performed by: EMERGENCY MEDICINE

## 2023-11-01 PROCEDURE — 86901 BLOOD TYPING SEROLOGIC RH(D): CPT | Performed by: EMERGENCY MEDICINE

## 2023-11-01 PROCEDURE — 96375 TX/PRO/DX INJ NEW DRUG ADDON: CPT | Performed by: EMERGENCY MEDICINE

## 2023-11-01 PROCEDURE — 85004 AUTOMATED DIFF WBC COUNT: CPT | Performed by: EMERGENCY MEDICINE

## 2023-11-01 PROCEDURE — 36415 COLL VENOUS BLD VENIPUNCTURE: CPT | Performed by: EMERGENCY MEDICINE

## 2023-11-01 PROCEDURE — 258N000003 HC RX IP 258 OP 636: Performed by: EMERGENCY MEDICINE

## 2023-11-01 PROCEDURE — 96374 THER/PROPH/DIAG INJ IV PUSH: CPT | Performed by: EMERGENCY MEDICINE

## 2023-11-01 PROCEDURE — 80053 COMPREHEN METABOLIC PANEL: CPT | Performed by: EMERGENCY MEDICINE

## 2023-11-01 RX ORDER — ONDANSETRON 2 MG/ML
4 INJECTION INTRAMUSCULAR; INTRAVENOUS ONCE
Status: COMPLETED | OUTPATIENT
Start: 2023-11-01 | End: 2023-11-01

## 2023-11-01 RX ORDER — ONDANSETRON 4 MG/1
4 TABLET, FILM COATED ORAL EVERY 6 HOURS PRN
Qty: 18 TABLET | Refills: 0 | Status: SHIPPED | OUTPATIENT
Start: 2023-11-01 | End: 2023-11-09

## 2023-11-01 RX ADMIN — SODIUM CHLORIDE 1000 ML: 9 INJECTION, SOLUTION INTRAVENOUS at 03:40

## 2023-11-01 RX ADMIN — PANTOPRAZOLE SODIUM 40 MG: 40 INJECTION, POWDER, FOR SOLUTION INTRAVENOUS at 03:39

## 2023-11-01 RX ADMIN — ONDANSETRON 4 MG: 2 INJECTION INTRAMUSCULAR; INTRAVENOUS at 03:40

## 2023-11-01 ASSESSMENT — ACTIVITIES OF DAILY LIVING (ADL)
ADLS_ACUITY_SCORE: 37
ADLS_ACUITY_SCORE: 37

## 2023-11-01 NOTE — ED NOTES
Bed: ED16  Expected date: 10/31/23  Expected time: 11:43 PM  Means of arrival:   Comments:  EMS/SICK

## 2023-11-01 NOTE — ED PROVIDER NOTES
History     Chief Complaint   Patient presents with    Nausea & Vomiting    Addiction Problem     HPI  Andrew Collier is a 57 year old male with PMH notable for opioid use disorder with ED visit at [facility] yesterday after overdose and naloxone response, HIV, toxoplasma, amphetamine use disorder, SBO  who presents to the ED with hematemesis.  Patient reports since discharging from [facility] around 7 PM, he started having vomiting with small amount of blood in it 2 hours afterward.  Patient estimates 10 episodes of vomiting thus far, each with a small amount of blood.  He is not having significant abdominal pain.  He endorses using amphetamines this past evening.  He denies further opioids since being at [facility].     Past Medical History  Past Medical History:   Diagnosis Date    Acute appendicitis with localized peritonitis 1/31/2018    AIDS (H)     Allergic rhinitis due to other allergen     DNS    Anal dysplasia     Chronic abdominal pain     CNS toxoplasmosis (H)     COVID-19 1/11/2022    Diabetes type 2, controlled (H)     GERD (gastroesophageal reflux disease)     Herpes zoster 9/23/2016    History of seizure     History of substance abuse (H)     HIV (human immunodeficiency virus infection) (H)     HLD (hyperlipidemia)     Lung nodules     Periungual wart     Pneumonia 1/6/2019    PTSD (post-traumatic stress disorder)     Sleep apnea     doesn't use CPAP     Past Surgical History:   Procedure Laterality Date    ANOSCOPY N/A 9/9/2020    Procedure: Exam Under Anesthesia, ANOSCOPY, fulgeration of rectal fissures with Rectal Biopsies;  Surgeon: Thanh Lundberg MD;  Location:  OR    COLONOSCOPY Left 1/22/2016    Procedure: COMBINED COLONOSCOPY, SINGLE OR MULTIPLE BIOPSY/POLYPECTOMY BY BIOPSY;  Surgeon: Clark Saini MD;  Location:  GI    ESOPHAGOSCOPY, GASTROSCOPY, DUODENOSCOPY (EGD), COMBINED N/A 6/6/2022    Procedure: ESOPHAGOGASTRODUODENOSCOPY, WITH BIOPSY;  Surgeon: Kecia Benítez MD;  Location:   GI    HC EXPLORE UNDESC TESTIS,INGUIN/SCROTAL      LAPAROSCOPIC APPENDECTOMY N/A 1/31/2018    Procedure: LAPAROSCOPIC APPENDECTOMY;  LAPAROSCOPIC APPENDECTOMY;  Surgeon: Dawn Holt MD;  Location: [facility] OR    LAPAROSCOPY DIAGNOSTIC (GENERAL) N/A 7/26/2016    Procedure: LAPAROSCOPY DIAGNOSTIC (GENERAL);  Surgeon: Susannah Arriaga MD;  Location: [facility] OR    LAPAROSCOPY DIAGNOSTIC (GENERAL) N/A 4/16/2018    Procedure: LAPAROSCOPY DIAGNOSTIC (GENERAL);  Diagnostic laparoscopy and lysis of adhesions;  Surgeon: Prince Dowling MD;  Location: [facility] OR    OPTICAL TRACKING SYSTEM CRANIOTOMY, EXCISE TUMOR, COMBINED Left 4/10/2015    Procedure: COMBINED OPTICAL TRACKING SYSTEM CRANIOTOMY, EXCISE TUMOR;  Surgeon: Mirlande Colmenares MD;  Location: [facility] OR    REPAIR GAMEKEEPER'S THUMB Right 12/2/2016    Procedure: REPAIR LIGAMENT ULNAR COLLATERAL THUMB (GAMEKEEPER'S);  Surgeon: Evin Zamorano MD;  Location: [facility] OR    ZZC NONSPECIFIC PROCEDURE      right forearm fracture     ondansetron (ZOFRAN) 4 MG tablet  alum & mag hydroxide-simethicone (MAALOX) 200-200-20 MG/5ML SUSP suspension  bictegravir-emtricitabine-tenofovir (BIKTARVY) -25 MG per tablet  blood glucose (NO BRAND SPECIFIED) lancets standard  blood glucose (NO BRAND SPECIFIED) test strip  buPROPion (WELLBUTRIN XL) 150 MG 24 hr tablet  doxycycline hyclate (VIBRAMYCIN) 100 MG capsule  famotidine (PEPCID) 40 MG tablet  glimepiride (AMARYL) 4 MG tablet  ibuprofen (ADVIL/MOTRIN) 600 MG tablet  insulin glargine (LANTUS PEN) 100 UNIT/ML pen  insulin pen needle (32G X 4 MM) 32G X 4 MM miscellaneous  mupirocin (BACTROBAN) 2 % external ointment  polyethylene glycol (MIRALAX) 17 GM/Dose powder  sitagliptin (JANUVIA) 100 MG tablet  tamsulosin (FLOMAX) 0.4 MG capsule      Allergies   Allergen Reactions    Fentanyl Blisters     Per pt, after taking this medication had blisters develope    Tylenol [Acetaminophen] Itching    Dulaglutide Rash    Insulin Lispro  "Rash     Patient reported    Penicillin V Other (See Comments) and Rash     Diffuse maculopapular rash + feels \"high\", per pt.      Family History  Family History   Problem Relation Age of Onset    Diabetes Brother     Diabetes Father     Alzheimer Disease Father     Unknown/Adopted Mother     Diabetes Paternal Grandfather     Cancer No family hx of         no skin cancer    Skin Cancer No family hx of         no famiy hx of skin cancer    Glaucoma No family hx of     Macular Degeneration No family hx of      Social History   Social History     Tobacco Use    Smoking status: Some Days     Packs/day: 0.25     Years: 40.00     Additional pack years: 0.00     Total pack years: 10.00     Types: Cigarettes    Smokeless tobacco: Former   Substance Use Topics    Alcohol use: No     Alcohol/week: 0.0 standard drinks of alcohol     Comment: Last etoh in 2007    Drug use: Not Currently     Types: Marijuana, Methamphetamines         A medically appropriate review of systems was performed with pertinent positives and negatives noted in the HPI, and all other systems negative.    Physical Exam   BP: 106/68  Pulse: 90  Temp: 98  F (36.7  C)  Resp: 12  Height: 165.1 cm (5' 5\")  Weight: 72.6 kg (160 lb 0.9 oz)  SpO2: 97 %    Physical Exam  General: no acute distress. Appears stated age.   HENT: MMM, no oropharyngeal lesions  Eyes: PERRL, normal sclerae  Cardio: Regular rate. Regular rhythm. Extremities well perfused  Resp: Normal work of breathing, normal respiratory rate.  Abdomen: no tenderness, non-distended, no rebound, no guarding  Neuro: alert and fully oriented. CN II-XII grossly intact. Grossly normal strength and sensation in all extremities.   MSK: no deformities. Grossly normal ROM in extremities.   Integumentary/Skin: no rash visualized, normal color  Psych: normal affect, normal behavior    ED Course      Procedures            EKG Interpretation:      Interpreted by Lázaro Callejas MD  Time reviewed: 3480  Symptoms " at time of EKG: multiple QT prolonging medications recently   Rhythm: normal sinus   Rate: Normal  Axis: Normal  Ectopy: none  Conduction: normal  ST Segments/ T Waves: No acute ischemic changes and QTc 446  Q Waves: none  Comparison to prior: Unchanged from 9/20/2023    Clinical Impression: normal EKG               Labs Ordered and Resulted from Time of ED Arrival to Time of ED Departure   COMPREHENSIVE METABOLIC PANEL - Abnormal       Result Value    Sodium 132 (*)     Potassium 3.3 (*)     Carbon Dioxide (CO2) 24      Anion Gap 9      Urea Nitrogen 21.4      Creatinine 0.66 (*)     GFR Estimate >90      Calcium 8.8      Chloride 99      Glucose 295 (*)     Alkaline Phosphatase 113      AST 25      ALT 16      Protein Total 6.6      Albumin 3.8      Bilirubin Total 0.8     TROPONIN T, HIGH SENSITIVITY - Abnormal    Troponin T, High Sensitivity 41 (*)    CBC WITH PLATELETS AND DIFFERENTIAL - Abnormal    WBC Count 8.2      RBC Count 3.95 (*)     Hemoglobin 11.3 (*)     Hematocrit 34.0 (*)     MCV 86      MCH 28.6      MCHC 33.2      RDW 13.7      Platelet Count 319      % Neutrophils 65      % Lymphocytes 25      % Monocytes 8      % Eosinophils 1      % Basophils 0      % Immature Granulocytes 1      NRBCs per 100 WBC 0      Absolute Neutrophils 5.3      Absolute Lymphocytes 2.0      Absolute Monocytes 0.7      Absolute Eosinophils 0.1      Absolute Basophils 0.0      Absolute Immature Granulocytes 0.0      Absolute NRBCs 0.0     TROPONIN T, HIGH SENSITIVITY - Abnormal    Troponin T, High Sensitivity 38 (*)    INR - Normal    INR 1.13     PARTIAL THROMBOPLASTIN TIME - Normal    aPTT 30     TYPE AND SCREEN, ADULT    ABO/RH(D) O POS      Antibody Screen Negative      SPECIMEN EXPIRATION DATE [id number]     ABO/RH TYPE AND SCREEN     No orders to display            Medical Decision Making  The patient's presentation was of high complexity (an acute health issue posing potential threat to life or bodily  function).    The patient's evaluation involved:  review of external note(s) from 1 sources ([facility] ED)  ordering and/or review of 3+ test(s) in this encounter (see separate area of note for details)    The patient's management necessitated moderate risk (prescription drug management including medications given in the ED).      Assessments & Plan (with Medical Decision Making)   Patient presenting with vomiting with blood. Vitals in the ED unremarkable. Nursing notes reviewed.     Chart review most notable for ED visit at [facility] this past day.  Notes there mention patient was brought in after opiate overdose and having received naloxone.  Patient then had large amounts of vomiting while in the ED.  No hematemesis noted.  High clinical suspicion for Courtney-Ruano tear as etiology of patient now having some blood in the emesis.  No history of varices.  Hemoglobin 11.3, stable from previous.  EKG showed normal sinus rhythm without evidence of acute ischemia, QTc 446.  High-sensitivity troponin T mildly elevated but stable on repeat.    In the ED, the patient's symptoms were managed with ondansetron and pantoprazole IV, with improvement in symptoms upon reassessment.  Patient was monitored several hours in the ED without any episodes of vomiting.    The complete clinical picture is most consistent with Courtney-Ruano tear. After counseling on the diagnosis, work-up, and treatment plan, the patient was discharged to home.  Ondansetron prescribed, and patient advised to  refill of famotidine that is available at the discharge pharmacy from a previous prescription. The patient was advised to follow-up with primary care in few days. The patient was advised to return to the ED if worsening symptoms, or any urgent health concerns.     Final diagnoses:   Courtney-Ruano tear   Polysubstance use disorder     New Prescriptions    ONDANSETRON (ZOFRAN) 4 MG TABLET    Take 1 tablet (4 mg) by mouth every 6 hours as needed for nausea  or vomiting     --  Lázaro Callejas MD   Emergency Medicine   [facility] EMERGENCY DEPARTMENT  11/1/2023       Lázaro Callejas MD  11/01/23 0657

## 2023-11-01 NOTE — ED TRIAGE NOTES
BIBA for hematemesis 10x today, in past 24 hours pt right eye on fentanyl and got 5 doses of narcan in hospital. Pt used meth today at home.

## 2023-11-01 NOTE — DISCHARGE INSTRUCTIONS
Instructions from your doctor today:  Emergency Department (ED) testing is focused on the potential causes of your symptoms that are the most dangerous possibilities, and cannot cover every possibility. Based on the evaluation, it was deemed sufficiently safe to discharge and continue management through the clinics. Thus, follow-up is very important to assess for improvement/worsening, potential further testing, and potential treatment adjustments. If you were given opioid pain medications or other medications that can make you drowsy while in the ED, you should not drive for at least several hours and not until you feel completely back to normal.      a refill of your famotidine and newly prescribed ondansetron for nausea at the discharge pharmacy.    Please make an appointment to follow up with:  - Your Primary Care Provider in 2-3 days  - If you do not have a primary care provider, you can be seen in follow-up and establish care by calling any of the clinics below:     - Primary Care Center (phone: [phone])     - Primary Care / [facility] (phone: [phone])   - Have your clinic provider review the results from today's visit with you again, including any potential follow-up or additional testing that may be needed based on the results. Occasionally, incidental findings are found on later review by radiologists that may need follow-up.     Return to the Emergency Department immediately if you have worsening symptoms, or any other urgent health concerns.

## 2023-11-02 LAB
ATRIAL RATE - MUSE: 82 BPM
DIASTOLIC BLOOD PRESSURE - MUSE: NORMAL MMHG
INTERPRETATION ECG - MUSE: NORMAL
P AXIS - MUSE: 47 DEGREES
PR INTERVAL - MUSE: 130 MS
QRS DURATION - MUSE: 90 MS
QT - MUSE: 382 MS
QTC - MUSE: 446 MS
R AXIS - MUSE: -10 DEGREES
SYSTOLIC BLOOD PRESSURE - MUSE: NORMAL MMHG
T AXIS - MUSE: 34 DEGREES
VENTRICULAR RATE- MUSE: 82 BPM

## 2023-11-08 ENCOUNTER — HOSPITAL ENCOUNTER (INPATIENT)
Facility: CLINIC | Age: 58
LOS: 1 days | Discharge: LEFT BEFORE DISCHARGE | End: 2023-11-08
Attending: EMERGENCY MEDICINE | Admitting: STUDENT IN AN ORGANIZED HEALTH CARE EDUCATION/TRAINING PROGRAM
Payer: COMMERCIAL

## 2023-11-08 ENCOUNTER — APPOINTMENT (OUTPATIENT)
Dept: GENERAL RADIOLOGY | Facility: CLINIC | Age: 58
End: 2023-11-08
Payer: COMMERCIAL

## 2023-11-08 ENCOUNTER — APPOINTMENT (OUTPATIENT)
Dept: CT IMAGING | Facility: CLINIC | Age: 58
End: 2023-11-08
Attending: EMERGENCY MEDICINE
Payer: COMMERCIAL

## 2023-11-08 VITALS
HEIGHT: 64 IN | OXYGEN SATURATION: 95 % | DIASTOLIC BLOOD PRESSURE: 66 MMHG | HEART RATE: 87 BPM | SYSTOLIC BLOOD PRESSURE: 134 MMHG | WEIGHT: 164.6 LBS | TEMPERATURE: 98.3 F | BODY MASS INDEX: 28.1 KG/M2 | RESPIRATION RATE: 20 BRPM

## 2023-11-08 DIAGNOSIS — R73.9 HYPERGLYCEMIA: ICD-10-CM

## 2023-11-08 DIAGNOSIS — K56.609 SMALL BOWEL OBSTRUCTION (H): ICD-10-CM

## 2023-11-08 LAB
ALBUMIN SERPL BCG-MCNC: 4.1 G/DL (ref 3.5–5.2)
ALP SERPL-CCNC: 155 U/L (ref 40–129)
ALT SERPL W P-5'-P-CCNC: 13 U/L (ref 0–70)
ANION GAP SERPL CALCULATED.3IONS-SCNC: 13 MMOL/L (ref 7–15)
AST SERPL W P-5'-P-CCNC: 12 U/L (ref 0–45)
B-OH-BUTYR SERPL-SCNC: <0.18 MMOL/L
BASOPHILS # BLD AUTO: 0 10E3/UL (ref 0–0.2)
BASOPHILS NFR BLD AUTO: 1 %
BILIRUB SERPL-MCNC: 0.4 MG/DL
BUN SERPL-MCNC: 15.8 MG/DL (ref 8–23)
CALCIUM SERPL-MCNC: 9.9 MG/DL (ref 8.6–10)
CHLORIDE SERPL-SCNC: 96 MMOL/L (ref 98–107)
CREAT SERPL-MCNC: 0.89 MG/DL (ref 0.67–1.17)
DEPRECATED HCO3 PLAS-SCNC: 26 MMOL/L (ref 22–29)
EGFRCR SERPLBLD CKD-EPI 2021: >90 ML/MIN/1.73M2
EOSINOPHIL # BLD AUTO: 0.1 10E3/UL (ref 0–0.7)
EOSINOPHIL NFR BLD AUTO: 2 %
ERYTHROCYTE [DISTWIDTH] IN BLOOD BY AUTOMATED COUNT: 13.6 % (ref 10–15)
GLUCOSE BLD-MCNC: 295 MG/DL (ref 70–99)
GLUCOSE BLDC GLUCOMTR-MCNC: 238 MG/DL (ref 70–99)
GLUCOSE BLDC GLUCOMTR-MCNC: 245 MG/DL (ref 70–99)
GLUCOSE BLDC GLUCOMTR-MCNC: 273 MG/DL (ref 70–99)
GLUCOSE BLDC GLUCOMTR-MCNC: 284 MG/DL (ref 70–99)
GLUCOSE BLDC GLUCOMTR-MCNC: 294 MG/DL (ref 70–99)
GLUCOSE BLDC GLUCOMTR-MCNC: 307 MG/DL (ref 70–99)
GLUCOSE SERPL-MCNC: 518 MG/DL (ref 70–99)
HCT VFR BLD AUTO: 41.3 % (ref 40–53)
HGB BLD-MCNC: 13.9 G/DL (ref 13.3–17.7)
IMM GRANULOCYTES # BLD: 0.1 10E3/UL
IMM GRANULOCYTES NFR BLD: 1 %
LACTATE SERPL-SCNC: 1.4 MMOL/L (ref 0.7–2)
LACTATE SERPL-SCNC: 2.7 MMOL/L (ref 0.7–2)
LIPASE SERPL-CCNC: 56 U/L (ref 13–60)
LYMPHOCYTES # BLD AUTO: 2.2 10E3/UL (ref 0.8–5.3)
LYMPHOCYTES NFR BLD AUTO: 31 %
MCH RBC QN AUTO: 28.5 PG (ref 26.5–33)
MCHC RBC AUTO-ENTMCNC: 33.7 G/DL (ref 31.5–36.5)
MCV RBC AUTO: 85 FL (ref 78–100)
MONOCYTES # BLD AUTO: 0.4 10E3/UL (ref 0–1.3)
MONOCYTES NFR BLD AUTO: 6 %
NEUTROPHILS # BLD AUTO: 4.3 10E3/UL (ref 1.6–8.3)
NEUTROPHILS NFR BLD AUTO: 59 %
NRBC # BLD AUTO: 0 10E3/UL
NRBC BLD AUTO-RTO: 0 /100
PLATELET # BLD AUTO: 360 10E3/UL (ref 150–450)
POTASSIUM SERPL-SCNC: 3.9 MMOL/L (ref 3.4–5.3)
PROT SERPL-MCNC: 7.7 G/DL (ref 6.4–8.3)
RBC # BLD AUTO: 4.87 10E6/UL (ref 4.4–5.9)
SODIUM SERPL-SCNC: 135 MMOL/L (ref 135–145)
WBC # BLD AUTO: 7.2 10E3/UL (ref 4–11)

## 2023-11-08 PROCEDURE — 96375 TX/PRO/DX INJ NEW DRUG ADDON: CPT | Performed by: EMERGENCY MEDICINE

## 2023-11-08 PROCEDURE — 258N000003 HC RX IP 258 OP 636: Performed by: EMERGENCY MEDICINE

## 2023-11-08 PROCEDURE — 120N000002 HC R&B MED SURG/OB UMMC

## 2023-11-08 PROCEDURE — 250N000011 HC RX IP 250 OP 636: Performed by: EMERGENCY MEDICINE

## 2023-11-08 PROCEDURE — 250N000013 HC RX MED GY IP 250 OP 250 PS 637

## 2023-11-08 PROCEDURE — 85025 COMPLETE CBC W/AUTO DIFF WBC: CPT | Performed by: EMERGENCY MEDICINE

## 2023-11-08 PROCEDURE — 80053 COMPREHEN METABOLIC PANEL: CPT | Performed by: EMERGENCY MEDICINE

## 2023-11-08 PROCEDURE — 83605 ASSAY OF LACTIC ACID: CPT | Performed by: EMERGENCY MEDICINE

## 2023-11-08 PROCEDURE — 82010 KETONE BODYS QUAN: CPT

## 2023-11-08 PROCEDURE — 250N000009 HC RX 250

## 2023-11-08 PROCEDURE — 83690 ASSAY OF LIPASE: CPT | Performed by: EMERGENCY MEDICINE

## 2023-11-08 PROCEDURE — 82962 GLUCOSE BLOOD TEST: CPT

## 2023-11-08 PROCEDURE — 96361 HYDRATE IV INFUSION ADD-ON: CPT | Performed by: EMERGENCY MEDICINE

## 2023-11-08 PROCEDURE — 99285 EMERGENCY DEPT VISIT HI MDM: CPT | Mod: 25 | Performed by: EMERGENCY MEDICINE

## 2023-11-08 PROCEDURE — 74018 RADEX ABDOMEN 1 VIEW: CPT | Mod: 26 | Performed by: RADIOLOGY

## 2023-11-08 PROCEDURE — 99285 EMERGENCY DEPT VISIT HI MDM: CPT | Performed by: EMERGENCY MEDICINE

## 2023-11-08 PROCEDURE — 96374 THER/PROPH/DIAG INJ IV PUSH: CPT | Performed by: EMERGENCY MEDICINE

## 2023-11-08 PROCEDURE — 250N000011 HC RX IP 250 OP 636: Mod: JZ | Performed by: EMERGENCY MEDICINE

## 2023-11-08 PROCEDURE — 250N000013 HC RX MED GY IP 250 OP 250 PS 637: Performed by: EMERGENCY MEDICINE

## 2023-11-08 PROCEDURE — 99207 PR APP CREDIT; MD BILLING SHARED VISIT: CPT

## 2023-11-08 PROCEDURE — 250N000011 HC RX IP 250 OP 636: Mod: JZ

## 2023-11-08 PROCEDURE — 250N000011 HC RX IP 250 OP 636

## 2023-11-08 PROCEDURE — 82947 ASSAY GLUCOSE BLOOD QUANT: CPT

## 2023-11-08 PROCEDURE — 36415 COLL VENOUS BLD VENIPUNCTURE: CPT

## 2023-11-08 PROCEDURE — 250N000012 HC RX MED GY IP 250 OP 636 PS 637

## 2023-11-08 PROCEDURE — 999N000065 XR ABDOMEN PORT 1 VIEW

## 2023-11-08 PROCEDURE — 74177 CT ABD & PELVIS W/CONTRAST: CPT

## 2023-11-08 PROCEDURE — 74177 CT ABD & PELVIS W/CONTRAST: CPT | Mod: 26 | Performed by: RADIOLOGY

## 2023-11-08 PROCEDURE — 36415 COLL VENOUS BLD VENIPUNCTURE: CPT | Performed by: EMERGENCY MEDICINE

## 2023-11-08 PROCEDURE — 99235 HOSP IP/OBS SAME DATE MOD 70: CPT | Mod: GC | Performed by: STUDENT IN AN ORGANIZED HEALTH CARE EDUCATION/TRAINING PROGRAM

## 2023-11-08 RX ORDER — IOPAMIDOL 755 MG/ML
100 INJECTION, SOLUTION INTRAVASCULAR ONCE
Status: COMPLETED | OUTPATIENT
Start: 2023-11-08 | End: 2023-11-08

## 2023-11-08 RX ORDER — FAMOTIDINE 20 MG/1
40 TABLET, FILM COATED ORAL DAILY
Status: DISCONTINUED | OUTPATIENT
Start: 2023-11-08 | End: 2023-11-08 | Stop reason: HOSPADM

## 2023-11-08 RX ORDER — ENOXAPARIN SODIUM 100 MG/ML
40 INJECTION SUBCUTANEOUS EVERY 24 HOURS
Status: DISCONTINUED | OUTPATIENT
Start: 2023-11-08 | End: 2023-11-08 | Stop reason: HOSPADM

## 2023-11-08 RX ORDER — NICOTINE POLACRILEX 4 MG
15-30 LOZENGE BUCCAL
Status: DISCONTINUED | OUTPATIENT
Start: 2023-11-08 | End: 2023-11-08 | Stop reason: HOSPADM

## 2023-11-08 RX ORDER — ONDANSETRON 2 MG/ML
4 INJECTION INTRAMUSCULAR; INTRAVENOUS EVERY 30 MIN PRN
Status: DISCONTINUED | OUTPATIENT
Start: 2023-11-08 | End: 2023-11-08 | Stop reason: HOSPADM

## 2023-11-08 RX ORDER — POLYETHYLENE GLYCOL 3350 17 G/17G
17 POWDER, FOR SOLUTION ORAL 2 TIMES DAILY
Status: DISCONTINUED | OUTPATIENT
Start: 2023-11-08 | End: 2023-11-08 | Stop reason: HOSPADM

## 2023-11-08 RX ORDER — KETOROLAC TROMETHAMINE 15 MG/ML
15 INJECTION, SOLUTION INTRAMUSCULAR; INTRAVENOUS EVERY 6 HOURS PRN
Status: DISCONTINUED | OUTPATIENT
Start: 2023-11-08 | End: 2023-11-08 | Stop reason: HOSPADM

## 2023-11-08 RX ORDER — HYDROMORPHONE HYDROCHLORIDE 1 MG/ML
0.3 INJECTION, SOLUTION INTRAMUSCULAR; INTRAVENOUS; SUBCUTANEOUS EVERY 4 HOURS PRN
Status: DISCONTINUED | OUTPATIENT
Start: 2023-11-08 | End: 2023-11-08 | Stop reason: HOSPADM

## 2023-11-08 RX ORDER — DEXTROSE MONOHYDRATE 25 G/50ML
25-50 INJECTION, SOLUTION INTRAVENOUS
Status: DISCONTINUED | OUTPATIENT
Start: 2023-11-08 | End: 2023-11-08 | Stop reason: HOSPADM

## 2023-11-08 RX ORDER — KETOROLAC TROMETHAMINE 15 MG/ML
15 INJECTION, SOLUTION INTRAMUSCULAR; INTRAVENOUS ONCE
Status: COMPLETED | OUTPATIENT
Start: 2023-11-08 | End: 2023-11-08

## 2023-11-08 RX ORDER — HYDROMORPHONE HYDROCHLORIDE 1 MG/ML
0.5 INJECTION, SOLUTION INTRAMUSCULAR; INTRAVENOUS; SUBCUTANEOUS ONCE
Status: COMPLETED | OUTPATIENT
Start: 2023-11-08 | End: 2023-11-08

## 2023-11-08 RX ORDER — BUPROPION HYDROCHLORIDE 150 MG/1
150 TABLET ORAL DAILY
Status: DISCONTINUED | OUTPATIENT
Start: 2023-11-08 | End: 2023-11-08 | Stop reason: HOSPADM

## 2023-11-08 RX ORDER — TAMSULOSIN HYDROCHLORIDE 0.4 MG/1
0.4 CAPSULE ORAL DAILY
Status: DISCONTINUED | OUTPATIENT
Start: 2023-11-08 | End: 2023-11-08

## 2023-11-08 RX ORDER — ONDANSETRON 4 MG/1
4 TABLET, FILM COATED ORAL EVERY 6 HOURS PRN
Status: DISCONTINUED | OUTPATIENT
Start: 2023-11-08 | End: 2023-11-08 | Stop reason: HOSPADM

## 2023-11-08 RX ORDER — LIDOCAINE HYDROCHLORIDE 20 MG/ML
10 JELLY TOPICAL ONCE
Status: COMPLETED | OUTPATIENT
Start: 2023-11-08 | End: 2023-11-08

## 2023-11-08 RX ORDER — MAGNESIUM HYDROXIDE/ALUMINUM HYDROXICE/SIMETHICONE 120; 1200; 1200 MG/30ML; MG/30ML; MG/30ML
15 SUSPENSION ORAL ONCE
Status: COMPLETED | OUTPATIENT
Start: 2023-11-08 | End: 2023-11-08

## 2023-11-08 RX ADMIN — INSULIN GLARGINE 15 UNITS: 100 INJECTION, SOLUTION SUBCUTANEOUS at 05:55

## 2023-11-08 RX ADMIN — HYDROMORPHONE HYDROCHLORIDE 0.3 MG: 1 INJECTION, SOLUTION INTRAMUSCULAR; INTRAVENOUS; SUBCUTANEOUS at 12:20

## 2023-11-08 RX ADMIN — INSULIN ASPART 2 UNITS: 100 INJECTION, SOLUTION INTRAVENOUS; SUBCUTANEOUS at 17:51

## 2023-11-08 RX ADMIN — POLYETHYLENE GLYCOL 3350 17 G: 17 POWDER, FOR SOLUTION ORAL at 11:29

## 2023-11-08 RX ADMIN — SODIUM CHLORIDE 1000 ML: 9 INJECTION, SOLUTION INTRAVENOUS at 01:22

## 2023-11-08 RX ADMIN — ALUMINUM HYDROXIDE, MAGNESIUM HYDROXIDE, AND SIMETHICONE 15 ML: 200; 200; 20 SUSPENSION ORAL at 04:14

## 2023-11-08 RX ADMIN — ENOXAPARIN SODIUM 40 MG: 40 INJECTION SUBCUTANEOUS at 11:30

## 2023-11-08 RX ADMIN — KETOROLAC TROMETHAMINE 15 MG: 15 INJECTION, SOLUTION INTRAMUSCULAR; INTRAVENOUS at 01:22

## 2023-11-08 RX ADMIN — BUPROPION HYDROCHLORIDE 150 MG: 150 TABLET, FILM COATED, EXTENDED RELEASE ORAL at 11:29

## 2023-11-08 RX ADMIN — FAMOTIDINE 40 MG: 20 TABLET ORAL at 11:29

## 2023-11-08 RX ADMIN — INSULIN ASPART 3 UNITS: 100 INJECTION, SOLUTION INTRAVENOUS; SUBCUTANEOUS at 11:36

## 2023-11-08 RX ADMIN — HUMAN INSULIN 10 UNITS: 100 INJECTION, SOLUTION SUBCUTANEOUS at 03:43

## 2023-11-08 RX ADMIN — IOPAMIDOL 100 ML: 755 INJECTION, SOLUTION INTRAVENOUS at 02:01

## 2023-11-08 RX ADMIN — ONDANSETRON 4 MG: 2 INJECTION INTRAMUSCULAR; INTRAVENOUS at 01:22

## 2023-11-08 RX ADMIN — BICTEGRAVIR SODIUM, EMTRICITABINE, AND TENOFOVIR ALAFENAMIDE FUMARATE 1 TABLET: 50; 200; 25 TABLET ORAL at 11:29

## 2023-11-08 RX ADMIN — LIDOCAINE HYDROCHLORIDE 10 ML: 20 JELLY TOPICAL at 04:36

## 2023-11-08 RX ADMIN — HYDROMORPHONE HYDROCHLORIDE 0.5 MG: 1 INJECTION, SOLUTION INTRAMUSCULAR; INTRAVENOUS; SUBCUTANEOUS at 04:26

## 2023-11-08 ASSESSMENT — ACTIVITIES OF DAILY LIVING (ADL)
ADLS_ACUITY_SCORE: 37

## 2023-11-08 NOTE — CONSULTS
Resident/Fellow Attestation   I, Sonja Javier MD, was present with the medical/[employer] student who participated in the service and in the documentation of the note.  I have verified the history and personally performed the physical exam and medical decision making.  I agree with the assessment and plan of care as documented in the note.      Patient with multiple co-morbidities including frequent small bowel obstructions requiring hospitalization for NGT decompression. Previously underwent a diagnostic laparoscopy with lysis of adhesions in 2018 with Dr. Dowling. He presents with a recurrent SBO consistent with prior episodes. NGT placed and patient is feeling much better. No acute surgical intervention recommended. Recommend non-operative management with NGT. General surgery will sign off at this time, please reach out with any additional questions or new concerns. Discussed plan with Dr. Lopez, primary team.    Sonja Javier MD PGY-1   General Surgery Resident    [facility]    Consult Note - Emergency General Surgery Service  Date of Admission:  11/8/2023  Consult Requested by: Merlyn Cronin MD  Reason for Consult: SBO    Assessment & Plan: Surgery   Andrew Collier is a 57 year old male with a history of HIV, toxoplasma, amphetamine use disorder, opioid use disorder, T2DM, and recurrent SBO presents with abdominal pain. History, exam, labs, and imaging consistent with recurrent SBO. Patient is afebrile, hemodynamically stable with Lactate 2.7, hyperglycemia 518. An NGT was placed with improvement of symptoms. Agree with plan by medicine. No acute surgical intervention indicated.      Plan  - Continue NGT decompression  - IVF  - Multimodal pain control  - Antiemetics PRN  - EGS will sign off at this time. Given similar and frequent presentations for recurrent SBO, ok for management per medicine.       Drains: None     Code Status:  Full    Clinically  "Significant Risk Factors Present on Admission                      # DMII: A1C = N/A within past 6 months    # Overweight: Estimated body mass index is 28.25 kg/m  as calculated from the following:    Height as of this encounter: 1.626 m (5' 4\").    Weight as of this encounter: 74.7 kg (164 lb 9.6 oz).            The patient's care was discussed with the Attending Physician, Dr. Holt .    Jl Boss MD  [facility] [place]  Text page via [facility] Paging/Directory     ______________________________________________________________________    Chief Complaint   Abdominal pain    History is obtained from the patient    History of Present Illness   Andrew Collier is a 57 year old with a history of HIV, toxoplasma, amphetamine use disorder, opioid use disorder, T2DM, and recurrent SBO is a 57 year old male that presents with 1 day abdominal pain similar to previous SBO episodes. NGT in place, feeling much better with it. Patient declining to answer questions on evaluation.       Past Medical History    Past Medical History:   Diagnosis Date    Acute appendicitis with localized peritonitis 1/31/2018    AIDS (H)     Allergic rhinitis due to other allergen     DNS    Anal dysplasia     Chronic abdominal pain     CNS toxoplasmosis (H)     COVID-19 1/11/2022    Diabetes type 2, controlled (H)     GERD (gastroesophageal reflux disease)     Herpes zoster 9/23/2016    History of seizure     History of substance abuse (H)     HIV (human immunodeficiency virus infection) (H)     HLD (hyperlipidemia)     Lung nodules     Periungual wart     Pneumonia 1/6/2019    PTSD (post-traumatic stress disorder)     Sleep apnea     doesn't use CPAP       Past Surgical History   Past Surgical History:   Procedure Laterality Date    ANOSCOPY N/A 9/9/2020    Procedure: Exam Under Anesthesia, ANOSCOPY, fulgeration of rectal fissures with Rectal Biopsies;  Surgeon: Thanh Lundberg MD;  Location: " [facility] OR    COLONOSCOPY Left 1/22/2016    Procedure: COMBINED COLONOSCOPY, SINGLE OR MULTIPLE BIOPSY/POLYPECTOMY BY BIOPSY;  Surgeon: Clark Saini MD;  Location: [facility] GI    ESOPHAGOSCOPY, GASTROSCOPY, DUODENOSCOPY (EGD), COMBINED N/A 6/6/2022    Procedure: ESOPHAGOGASTRODUODENOSCOPY, WITH BIOPSY;  Surgeon: Kecia Benítez MD;  Location: [facility] GI    HC EXPLORE UNDESC TESTIS,INGUIN/SCROTAL      LAPAROSCOPIC APPENDECTOMY N/A 1/31/2018    Procedure: LAPAROSCOPIC APPENDECTOMY;  LAPAROSCOPIC APPENDECTOMY;  Surgeon: Dawn Holt MD;  Location: [facility] OR    LAPAROSCOPY DIAGNOSTIC (GENERAL) N/A 7/26/2016    Procedure: LAPAROSCOPY DIAGNOSTIC (GENERAL);  Surgeon: Susannah Arriaga MD;  Location: [facility]    LAPAROSCOPY DIAGNOSTIC (GENERAL) N/A 4/16/2018    Procedure: LAPAROSCOPY DIAGNOSTIC (GENERAL);  Diagnostic laparoscopy and lysis of adhesions;  Surgeon: Prince Dowling MD;  Location: [facility] OR    OPTICAL TRACKING SYSTEM CRANIOTOMY, EXCISE TUMOR, COMBINED Left 4/10/2015    Procedure: COMBINED OPTICAL TRACKING SYSTEM CRANIOTOMY, EXCISE TUMOR;  Surgeon: Mirlande Colmenares MD;  Location: [facility] OR    REPAIR GAMEKEEPER'S THUMB Right 12/2/2016    Procedure: REPAIR LIGAMENT ULNAR COLLATERAL THUMB (GAMEKEEPER'S);  Surgeon: Evin Zamorano MD;  Location:  OR    ZZC NONSPECIFIC PROCEDURE      right forearm fracture       Prior to Admission Medications   I have reviewed this patient's current medications       Review of Systems    The 10 point Review of Systems is negative other than noted in the HPI or here.      Physical Exam   Vital Signs: Temp: 98.3  F (36.8  C) Temp src: Oral BP: (!) (P) 155/98 Pulse: (P) 94   Resp: 20 SpO2: (P) 98 % O2 Device: (P) None (Room air)    Weight: 164 lbs 9.6 oz  Intake/Output Summary (Last 24 hours) at 11/8/2023 1412  Last data filed at 11/8/2023 1401  Gross per 24 hour   Intake 1000 ml   Output 2350 ml   Net -1350 ml     Gen:                Lying in bed in NAD, A&OX3  HEENT:            Normocephalic and atraumatic, NGT in place  CV:                  RRR  Pulm:               Non-labored breathing  Abd:                 Soft, non-distended, tender to palpation LUQ, no peritonitis  Ext:                  Warm and well perfused, no obvious deformities          Data     I have personally reviewed the following data over the past 24 hrs:    7.2  \   13.9   / 360     135 96 (L) 15.8 /  284 (H)   3.9 26 0.89 \     ALT: 13 AST: 12 AP: 155 (H) TBILI: 0.4   ALB: 4.1 TOT PROTEIN: 7.7 LIPASE: 56     Procal: N/A CRP: N/A Lactic Acid: 1.4         Imaging results reviewed over the past 24 hrs:   Recent Results (from the past 24 hour(s))   CT Abdomen Pelvis w Contrast    Narrative    EXAM: CT ABDOMEN AND PELVIS WITH CONTRAST  LOCATION: [facility]  DATE/TIME: 11/08/2023 2:16 AM CST    INDICATION: Left lower quadrant pain. Constipation.  COMPARISON: 09/24/2023.    TECHNIQUE: CT scan of the abdomen and pelvis was performed following injection of IV contrast. Multiplanar reformats were obtained. Dose reduction techniques were used.  CONTRAST: 100 mL Isovue 370.    FINDINGS:    LOWER CHEST: Unremarkable.    HEPATOBILIARY: Small gallstone in the gallbladder.    SPLEEN: Unremarkable.    PANCREAS: Unremarkable.    ADRENAL GLANDS: Unremarkable.    KIDNEYS/BLADDER: Unremarkable.    BOWEL: The stomach is moderately distended with fluid. Moderate dilatation of a moderately long segment of small bowel in the anterior mid left hemiabdomen. There is an abrupt transition to a nondistended more distal small bowel. The colon is   nondistended. The appendix is not definitely visualized. No bowel wall thickening, pneumatosis or free intraperitoneal gas.    LYMPH NODES: Unremarkable.    PELVIC ORGANS: Moderate enlargement of the prostate gland. Partial visualization of a penile prosthesis.    MUSCULOSKELETAL: No acute findings.    OTHER: Atherosclerotic calcification in the abdominal  aorta.      Impression    IMPRESSION:   1.  Mid small bowel obstruction: A moderately long segment of moderately dilated fluid-filled small bowel is present in the left hemiabdomen and there is an abrupt transition to a decompressed more distal small bowel. Additionally, the stomach is   moderately distended with fluid.  2.  No other cause of acute pain identified in the abdomen or pelvis.   XR Abdomen Port 1 View    Narrative    EXAM: ABDOMEN SINGLE VIEW PORTABLE  LOCATION: [facility]  DATE/TIME: 11/08/2023 4:47 AM CST    INDICATION: Tube placement.  COMPARISON: 11/08/2023 at 0149 hours - CT abdomen and pelvis.      Impression    IMPRESSION: Interval placement of an enteric drainage tube with distal tip in the gastric body.

## 2023-11-08 NOTE — PROGRESS NOTES
Brief medicine note:        Andrew Collier is a 57 year old male admitted on 11/8/2023. He has a history of HIV, toxoplasma, amphetamine use disorder, opioid use disorder, T2DM, and chronic SBO who presents with abdominal pain.      Vomiting  Abdominal pain   Chronic SBO 2/2 prior adhesions   CT with moderately long segment of moderately dilated fluid-filled small bowel is present in the left hemiabdomen and there is an abrupt transition to a decompressed more distal small bowel with moderately distended stomach. Vitals are reassuring, and pt is hemodynamically stable. Lactate 2.7 and WBC wnl.  NGT was placed with improvement of symptoms. No acute surgical intervention indicated.   - Continue NGT decompression  - IVF  - Multimodal pain control  - Antiemetics PRN  - Gen surg to sign off     Hyperglycemia  T2DM, poorly controlled   A1c 08/06/23 13.3. Glucose on admission 518. Received 25u Lantus in the ED and IV fluids with appropriate correction to 245. Low concern for DKA as there is no anion gap or ketones. Endocrinology consulted in the past for insulin regimens given patient's housing instability. Pt states he uses insulin, Januvia, and glimepiride for diabetes management.   -Q1hr glucose checks  - Lantus 15 units once daily   - regular insulin 10 units     Amphetamine use disorder  Opioid use disorder   - Pt did not want to discuss d/t being tired  - Discuss addiction medicine consult on 11/9     HIV  -Continue Biktarvy     Anxiety  Depression   - Continue PTA wellbutrin    HPI: Limited because he did not want to answer questions   He is feeling well and is tired. Noted history of obstructions.     ROS:   Denies chest pain, nausea, shortness of breath, dysuria, leg swelling, or other new symptoms.       ______________________    PE limited - pt refused auscultation and palpation of abdomen, along with other physical exam.     Constitutional: awake, alert, cooperative, no apparent distress, and appears  "stated age  Skin: no bruising or bleeding, normal skin color, texture, turgor, no redness, warmth, or swelling, no rashes, and no jaundice  Musculoskeletal: There is no redness, warmth, or swelling of the joints.   Neuropsychiatric: General: sleepy  Level of consciousness: alert     Today's vital signs, medications and nursing notes were reviewed.     BP (!) (P) 155/98   Pulse (P) 94   Temp 98.3  F (36.8  C) (Oral)   Resp 20   Ht 1.626 m (5' 4\")   Wt 74.7 kg (164 lb 9.6 oz)   SpO2 (P) 98%   BMI 28.25 kg/m          Jesus Hui PA-C  [facility]  Securely message with the Vocera Web Console (learn more here)  Text page via [facility] Paging/Directory    "

## 2023-11-08 NOTE — ED PROVIDER NOTES
[place] EMERGENCY DEPARTMENT ([facility])    11/08/23         History     Chief Complaint   Patient presents with    Abdominal Pain     HPI  Andrew Collier is a 57 year old male who with PMH of notable for opioid use disorder, HIV, toxoplasma, amphetamine use disorder, who presents to the ED with abdominal pain.   Patient reports that he has abdominal pain. His last bowel movement was at 4 am and then the pain had started at 6 am this morning. Patient is urinating fine. This is similar to the bowel obstruction he has had in the past. The pain does not radiate to back or groin. Patient also reports of feeling distended and he feels better after vomiting. Denies fevers. Denies any other symptoms. Denies any medications in the ambulance.     Per Epic Review:  CT Abdomen/Pelvis with contrast on 9/24/23:  IMPRESSION:   1. Similar-appearing mildly dilated segment of small bowel within the  left lower quadrant, however oral contrast is seen distal to this  segment to the ileocecal valve and in the cecum, suggestive of patency  beyond the focally dilated segment. This suggests low-grade/ partial  or resolving obstruction.   2. Similar-appearing thickening of the bladder wall, and associated  prostatomegaly suggestive of chronic outlet obstruction.   3. Unchanged appearance of penile prosthesis/implant with decompressed  reservoir and foci of air seen within the lines.      Past Medical History  Past Medical History:   Diagnosis Date    Acute appendicitis with localized peritonitis 1/31/2018    AIDS (H)     Allergic rhinitis due to other allergen     DNS    Anal dysplasia     Chronic abdominal pain     CNS toxoplasmosis (H)     COVID-19 1/11/2022    Diabetes type 2, controlled (H)     GERD (gastroesophageal reflux disease)     Herpes zoster 9/23/2016    History of seizure     History of substance abuse (H)     HIV (human immunodeficiency virus infection) (H)     HLD (hyperlipidemia)     Lung nodules      Periungual wart     Pneumonia 1/6/2019    PTSD (post-traumatic stress disorder)     Sleep apnea     doesn't use CPAP     Past Surgical History:   Procedure Laterality Date    ANOSCOPY N/A 9/9/2020    Procedure: Exam Under Anesthesia, ANOSCOPY, fulgeration of rectal fissures with Rectal Biopsies;  Surgeon: Thanh Lundberg MD;  Location: [facility] OR    COLONOSCOPY Left 1/22/2016    Procedure: COMBINED COLONOSCOPY, SINGLE OR MULTIPLE BIOPSY/POLYPECTOMY BY BIOPSY;  Surgeon: Clark Saini MD;  Location: [facility] GI    ESOPHAGOSCOPY, GASTROSCOPY, DUODENOSCOPY (EGD), COMBINED N/A 6/6/2022    Procedure: ESOPHAGOGASTRODUODENOSCOPY, WITH BIOPSY;  Surgeon: Kecia Benítez MD;  Location: [facility] GI    HC EXPLORE UNDESC TESTIS,INGUIN/SCROTAL      LAPAROSCOPIC APPENDECTOMY N/A 1/31/2018    Procedure: LAPAROSCOPIC APPENDECTOMY;  LAPAROSCOPIC APPENDECTOMY;  Surgeon: Dawn Holt MD;  Location: [facility] OR    LAPAROSCOPY DIAGNOSTIC (GENERAL) N/A 7/26/2016    Procedure: LAPAROSCOPY DIAGNOSTIC (GENERAL);  Surgeon: Susannah Arriaga MD;  Location: [facility] OR    LAPAROSCOPY DIAGNOSTIC (GENERAL) N/A 4/16/2018    Procedure: LAPAROSCOPY DIAGNOSTIC (GENERAL);  Diagnostic laparoscopy and lysis of adhesions;  Surgeon: Prince Dowling MD;  Location: [facility] OR    OPTICAL TRACKING SYSTEM CRANIOTOMY, EXCISE TUMOR, COMBINED Left 4/10/2015    Procedure: COMBINED OPTICAL TRACKING SYSTEM CRANIOTOMY, EXCISE TUMOR;  Surgeon: Mirlande Colmenares MD;  Location: [facility] OR    REPAIR GAMEKEEPER'S THUMB Right 12/2/2016    Procedure: REPAIR LIGAMENT ULNAR COLLATERAL THUMB (GAMEKEEPER'S);  Surgeon: Evin Zamorano MD;  Location: [facility] OR    ZZC NONSPECIFIC PROCEDURE      right forearm fracture     alum & mag hydroxide-simethicone (MAALOX) 200-200-20 MG/5ML SUSP suspension  bictegravir-emtricitabine-tenofovir (BIKTARVY) -25 MG per tablet  blood glucose (NO BRAND SPECIFIED) lancets standard  blood glucose (NO BRAND SPECIFIED) test strip  buPROPion  "(WELLBUTRIN XL) 150 MG 24 hr tablet  doxycycline hyclate (VIBRAMYCIN) 100 MG capsule  famotidine (PEPCID) 40 MG tablet  glimepiride (AMARYL) 4 MG tablet  ibuprofen (ADVIL/MOTRIN) 600 MG tablet  insulin glargine (LANTUS PEN) 100 UNIT/ML pen  insulin pen needle (32G X 4 MM) 32G X 4 MM miscellaneous  mupirocin (BACTROBAN) 2 % external ointment  ondansetron (ZOFRAN) 4 MG tablet  polyethylene glycol (MIRALAX) 17 GM/Dose powder  sitagliptin (JANUVIA) 100 MG tablet  tamsulosin (FLOMAX) 0.4 MG capsule      Allergies   Allergen Reactions    Fentanyl Blisters     Per pt, after taking this medication had blisters develope    Tylenol [Acetaminophen] Itching    Dulaglutide Rash    Insulin Lispro Rash     Patient reported    Penicillin V Other (See Comments) and Rash     Diffuse maculopapular rash + feels \"high\", per pt.      Family History  Family History   Problem Relation Age of Onset    Diabetes Brother     Diabetes Father     Alzheimer Disease Father     Unknown/Adopted Mother     Diabetes Paternal Grandfather     Cancer No family hx of         no skin cancer    Skin Cancer No family hx of         no famiy hx of skin cancer    Glaucoma No family hx of     Macular Degeneration No family hx of      Social History   Social History     Tobacco Use    Smoking status: Some Days     Packs/day: 0.25     Years: 40.00     Additional pack years: 0.00     Total pack years: 10.00     Types: Cigarettes    Smokeless tobacco: Former   Substance Use Topics    Alcohol use: No     Alcohol/week: 0.0 standard drinks of alcohol     Comment: Last etoh in 2007    Drug use: Not Currently     Types: Marijuana, Methamphetamines      Past medical history, past surgical history, medications, allergies, family history, and social history were reviewed with the patient. No additional pertinent items.      A complete review of systems was performed with pertinent positives and negatives noted in the HPI, and all other systems negative.    Physical Exam " "  BP: (!) 159/97  Pulse: 104  Temp: 98.3  F (36.8  C)  Resp: 20  Height: 162.6 cm (5' 4\")  Weight: 74.7 kg (164 lb 9.6 oz)  SpO2: 98 %  Physical Exam  Vitals and nursing note reviewed.   Constitutional:       General: He is in acute distress.      Appearance: Normal appearance. He is ill-appearing.   HENT:      Head: Normocephalic.      Nose: Nose normal.   Eyes:      Pupils: Pupils are equal, round, and reactive to light.   Cardiovascular:      Rate and Rhythm: Normal rate and regular rhythm.   Pulmonary:      Effort: Pulmonary effort is normal.   Abdominal:      General: There is no distension.      Comments: Diffuse tenderness palpation throughout the entire abdomen.  Moderate distention noted.  No overlying skin changes.  Negative CVA tenderness.  Mild guarding present.  No rebound tenderness or other signs of peritonitis.   Musculoskeletal:         General: No deformity. Normal range of motion.      Cervical back: Normal range of motion.   Skin:     General: Skin is warm.   Neurological:      Mental Status: He is alert and oriented to person, place, and time.   Psychiatric:         Mood and Affect: Mood normal.           ED Course, Procedures, & Data    12:35 AM  The patient was seen and examined by Kofi Mauro DO. in Room ED 14.        Results for orders placed or performed during the hospital encounter of 11/08/23   CT Abdomen Pelvis w Contrast     Status: None    Narrative    EXAM: CT ABDOMEN AND PELVIS WITH CONTRAST  LOCATION: [facility]  DATE/TIME: 11/08/2023 2:16 AM CST    INDICATION: Left lower quadrant pain. Constipation.  COMPARISON: 09/24/2023.    TECHNIQUE: CT scan of the abdomen and pelvis was performed following injection of IV contrast. Multiplanar reformats were obtained. Dose reduction techniques were used.  CONTRAST: 100 mL Isovue 370.    FINDINGS:    LOWER CHEST: Unremarkable.    HEPATOBILIARY: Small gallstone in the gallbladder.    SPLEEN: " Unremarkable.    PANCREAS: Unremarkable.    ADRENAL GLANDS: Unremarkable.    KIDNEYS/BLADDER: Unremarkable.    BOWEL: The stomach is moderately distended with fluid. Moderate dilatation of a moderately long segment of small bowel in the anterior mid left hemiabdomen. There is an abrupt transition to a nondistended more distal small bowel. The colon is   nondistended. The appendix is not definitely visualized. No bowel wall thickening, pneumatosis or free intraperitoneal gas.    LYMPH NODES: Unremarkable.    PELVIC ORGANS: Moderate enlargement of the prostate gland. Partial visualization of a penile prosthesis.    MUSCULOSKELETAL: No acute findings.    OTHER: Atherosclerotic calcification in the abdominal aorta.      Impression    IMPRESSION:   1.  Mid small bowel obstruction: A moderately long segment of moderately dilated fluid-filled small bowel is present in the left hemiabdomen and there is an abrupt transition to a decompressed more distal small bowel. Additionally, the stomach is   moderately distended with fluid.  2.  No other cause of acute pain identified in the abdomen or pelvis.   Comprehensive metabolic panel     Status: Abnormal   Result Value Ref Range    Sodium 135 135 - 145 mmol/L    Potassium 3.9 3.4 - 5.3 mmol/L    Carbon Dioxide (CO2) 26 22 - 29 mmol/L    Anion Gap 13 7 - 15 mmol/L    Urea Nitrogen 15.8 8.0 - 23.0 mg/dL    Creatinine 0.89 0.67 - 1.17 mg/dL    GFR Estimate >90 >60 mL/min/1.73m2    Calcium 9.9 8.6 - 10.0 mg/dL    Chloride 96 (L) 98 - 107 mmol/L    Glucose 518 (HH) 70 - 99 mg/dL    Alkaline Phosphatase 155 (H) 40 - 129 U/L    AST 12 0 - 45 U/L    ALT 13 0 - 70 U/L    Protein Total 7.7 6.4 - 8.3 g/dL    Albumin 4.1 3.5 - 5.2 g/dL    Bilirubin Total 0.4 <=1.2 mg/dL   Lipase     Status: Normal   Result Value Ref Range    Lipase 56 13 - 60 U/L   Lactic acid whole blood     Status: Abnormal   Result Value Ref Range    Lactic Acid 2.7 (H) 0.7 - 2.0 mmol/L   CBC with platelets and  differential     Status: None   Result Value Ref Range    WBC Count 7.2 4.0 - 11.0 10e3/uL    RBC Count 4.87 4.40 - 5.90 10e6/uL    Hemoglobin 13.9 13.3 - 17.7 g/dL    Hematocrit 41.3 40.0 - 53.0 %    MCV 85 78 - 100 fL    MCH 28.5 26.5 - 33.0 pg    MCHC 33.7 31.5 - 36.5 g/dL    RDW 13.6 10.0 - 15.0 %    Platelet Count 360 150 - 450 10e3/uL    % Neutrophils 59 %    % Lymphocytes 31 %    % Monocytes 6 %    % Eosinophils 2 %    % Basophils 1 %    % Immature Granulocytes 1 %    NRBCs per 100 WBC 0 <1 /100    Absolute Neutrophils 4.3 1.6 - 8.3 10e3/uL    Absolute Lymphocytes 2.2 0.8 - 5.3 10e3/uL    Absolute Monocytes 0.4 0.0 - 1.3 10e3/uL    Absolute Eosinophils 0.1 0.0 - 0.7 10e3/uL    Absolute Basophils 0.0 0.0 - 0.2 10e3/uL    Absolute Immature Granulocytes 0.1 <=0.4 10e3/uL    Absolute NRBCs 0.0 10e3/uL   Ketone Beta-Hydroxybutyrate Quantitative     Status: Normal   Result Value Ref Range    Ketone (Beta-Hydroxybutyrate) Quantitative <0.18 <=0.30 mmol/L   Glucose by meter     Status: Abnormal   Result Value Ref Range    GLUCOSE BY METER POCT 245 (H) 70 - 99 mg/dL   CBC with platelets differential     Status: None    Narrative    The following orders were created for panel order CBC with platelets differential.  Procedure                               Abnormality         Status                     ---------                               -----------         ------                     CBC with platelets and d...[[id number]]                      Final result                 Please view results for these tests on the individual orders.     Medications   ondansetron (ZOFRAN) injection 4 mg (4 mg Intravenous $Given 11/8/23 0122)   melatonin tablet 1 mg (has no administration in time range)   bictegravir-emtricitabine-tenofovir (BIKTARVY) -25 MG per tablet 1 tablet (has no administration in time range)   buPROPion (WELLBUTRIN XL) 24 hr tablet 150 mg (has no administration in time range)   famotidine (PEPCID) tablet 40  mg (has no administration in time range)   ondansetron (ZOFRAN) tablet 4 mg (has no administration in time range)   polyethylene glycol (MIRALAX) powder 17 g (has no administration in time range)   tamsulosin (FLOMAX) capsule 0.4 mg (has no administration in time range)   enoxaparin ANTICOAGULANT (LOVENOX) injection 40 mg (has no administration in time range)   benzocaine 20% (HURRICAINE/TOPEX) 20 % spray (has no administration in time range)   insulin glargine (LANTUS PEN) injection 15 Units (has no administration in time range)   sodium chloride 0.9% BOLUS 1,000 mL (0 mLs Intravenous Stopped 11/8/23 0253)   ketorolac (TORADOL) injection 15 mg (15 mg Intravenous $Given 11/8/23 0122)   iopamidol (ISOVUE-370) solution 100 mL (100 mLs Intravenous $Given 11/8/23 0201)   sodium chloride (PF) 0.9% PF flush 75 mL (75 mLs Intravenous $Given 11/8/23 0201)   insulin regular 1 unit/mL injection 10 Units (10 Units Intravenous $Given 11/8/23 0343)   alum & mag hydroxide-simethicone (MAALOX) suspension 15 mL (15 mLs Oral $Given 11/8/23 0414)   lidocaine (XYLOCAINE) 2 % external gel 10 mL (10 mLs Urethral $Given 11/8/23 0436)   HYDROmorphone (PF) (DILAUDID) injection 0.5 mg (0.5 mg Intravenous $Given 11/8/23 7826)     Labs Ordered and Resulted from Time of ED Arrival to Time of ED Departure   COMPREHENSIVE METABOLIC PANEL - Abnormal       Result Value    Sodium 135      Potassium 3.9      Carbon Dioxide (CO2) 26      Anion Gap 13      Urea Nitrogen 15.8      Creatinine 0.89      GFR Estimate >90      Calcium 9.9      Chloride 96 (*)     Glucose 518 (*)     Alkaline Phosphatase 155 (*)     AST 12      ALT 13      Protein Total 7.7      Albumin 4.1      Bilirubin Total 0.4     LACTIC ACID WHOLE BLOOD - Abnormal    Lactic Acid 2.7 (*)    GLUCOSE BY METER - Abnormal    GLUCOSE BY METER POCT 245 (*)    LIPASE - Normal    Lipase 56     KETONE BETA-HYDROXYBUTYRATE QUANTITATIVE, RAPID - Normal    Ketone (Beta-Hydroxybutyrate)  Quantitative <0.18     CBC WITH PLATELETS AND DIFFERENTIAL    WBC Count 7.2      RBC Count 4.87      Hemoglobin 13.9      Hematocrit 41.3      MCV 85      MCH 28.5      MCHC 33.7      RDW 13.6      Platelet Count 360      % Neutrophils 59      % Lymphocytes 31      % Monocytes 6      % Eosinophils 2      % Basophils 1      % Immature Granulocytes 1      NRBCs per 100 WBC 0      Absolute Neutrophils 4.3      Absolute Lymphocytes 2.2      Absolute Monocytes 0.4      Absolute Eosinophils 0.1      Absolute Basophils 0.0      Absolute Immature Granulocytes 0.1      Absolute NRBCs 0.0     LACTIC ACID WHOLE BLOOD   GLUCOSE MONITOR NURSING POCT   BLOOD GAS VENOUS   GLUCOSE WHOLE BLOOD   GLUCOSE MONITOR NURSING POCT     CT Abdomen Pelvis w Contrast   Final Result   IMPRESSION:    1.  Mid small bowel obstruction: A moderately long segment of moderately dilated fluid-filled small bowel is present in the left hemiabdomen and there is an abrupt transition to a decompressed more distal small bowel. Additionally, the stomach is    moderately distended with fluid.   2.  No other cause of acute pain identified in the abdomen or pelvis.      XR Abdomen Port 1 View    (Results Pending)          Critical care was not performed.     Medical Decision Making  The patient's presentation was of high complexity (a chronic illness severe exacerbation, progression, or side effect of treatment).    The patient's evaluation involved:  ordering and/or review of 3+ test(s) in this encounter (see separate area of note for details)  discussion of management or test interpretation with another health professional (see separate area of note for details)    The patient's management necessitated high risk (a decision regarding hospitalization).    Assessment & Plan    On arrival, patient appears distressed due to his level of pain.  Tenderness throughout his abdomen which is also noted to be moderately distended.  Was given IV Toradol and ondansetron  and started on IV fluids.  Labs ordered/reviewed.  CBC without leukocytosis or anemia.  Metabolic panel shows normal renal function.  Glucose significantly elevated at 518.  Patient was given 10 units of regular insulin and started on IV fluids.  Repeat glucose is 245.  Lactic acid elevated at 2.7.  Repeat pending.    CT scan shows mid small bowel obstruction.  Will order NG tube.  General surgery consulted.  Recommendations pending.  Plan for medicine admission.  Signout given to the triage hospitalist.    I have reviewed the nursing notes. I have reviewed the findings, diagnosis, plan and need for follow up with the patient.    New Prescriptions    No medications on file       Final diagnoses:   Small bowel obstruction (H)   Hyperglycemia   I, GAGANDEEP ERICKSON, am serving as a trained medical scribe to document services personally performed by Kofi Mauro DO, based on the provider's statements to me.     Kofi KELLY DO, was physically present and have reviewed and verified the accuracy of this note documented by GAGANDEEP ERICKSON.     Kofi Mauro DO.   [facility] EMERGENCY DEPARTMENT  11/8/2023     Kofi Mauro DO  11/08/23 0457

## 2023-11-08 NOTE — ED TRIAGE NOTES
Patient BIBA for abdominal pain. Patient states he has a bowel obstruction. Ems report patient was agitated and refused medications.

## 2023-11-08 NOTE — H&P
"    [facility]    History and Physical - Medicine Service, JORGE TEAM        Date of Admission:  11/8/2023    Assessment & Plan      Andrew Collier is a 57 year old male admitted on 11/8/2023. He has a history of HIV, toxoplasma, amphetamine use disorder, opioid use disorder, T2DM, and chronic SBO who presents for pain.     Vomiting  Abdominal pain   Chronic SBO 2/2 prior adhesions   CT with moderately long segment of moderately dilated fluid-filled small bowel is present in the left hemiabdomen and there is an abrupt transition to a decompressed more distal small bowel with moderately distended stomach.   -General surgery consulted in ED, appreciate recs   -Placed NG to LIS   -GI cocktail for pain management   -Continue bowel regimen     Hyperglycemia  T2DM, poorly controlled   A1c 08/06/23 13.3. Glucose on admission 518. Received 25u Lantus in the ED and IV fluids with appropriate correction to 245. Low concern for DKA as there is no anion gap or ketones. Endocrinology consulted in the past for insulin regimens given patient's housing instability.   -Q1hr glucose checks  -Can restart patient's home insulin regimen once corrected.     Amphetamine use disorder  Opioid use disorder   -Can consider addiction consult in AM     HIV  -Continue Biktarvy         Diet: NPO per Anesthesia Guidelines for Procedure/Surgery Except for: No Exceptions    DVT Prophylaxis: Enoxaparin (Lovenox) SQ  Potts Catheter: Not present  Fluids: None  Lines: None     Cardiac Monitoring: None  Code Status:  Defer to day team     Clinically Significant Risk Factors Present on Admission                      # DMII: A1C = N/A within past 6 months    # Overweight: Estimated body mass index is 28.25 kg/m  as calculated from the following:    Height as of this encounter: 1.626 m (5' 4\").    Weight as of this encounter: 74.7 kg (164 lb 9.6 oz).              Disposition Plan      Expected " Discharge Date: 11/10/2023                Patient to be formally staffed in the morning.     Merlyn Cronin MD  Medicine Service, [facility]  Securely message with [employer] (more info)  Text page via [employer] Paging/Directory   See signed in provider for up to date coverage information  ______________________________________________________________________    Chief Complaint   Pain, nausea/vomiting     History is obtained from the patient and chart review.     History of Present Illness   Andrew Collier is a 57 year old male admitted on 11/8/2023. He has a history of HIV, toxoplasma, amphetamine use disorder, opioid use disorder, T2DM, and chronic SBO who presents for pain. He states the abdominal pain started 6-7 hours prior to admission to ED and that the pain is very similar to prior episodes of SBO.  He states that in the past, GI cocktails and NG decompressions have been very helpful for the pain. Patient was hemodynamically stable in ED, electrolytes within normal limits, glucose 518, WBC 7.2, Hgb 13.9, plt 360, lactate 2.7. CT with moderately long segment of moderately dilated fluid-filled small bowel is present in the left hemiabdomen and there is an abrupt transition to a decompressed more distal small bowel with moderately distended stomach.     Past Medical History    Past Medical History:   Diagnosis Date    Acute appendicitis with localized peritonitis 1/31/2018    AIDS (H)     Allergic rhinitis due to other allergen     DNS    Anal dysplasia     Chronic abdominal pain     CNS toxoplasmosis (H)     COVID-19 1/11/2022    Diabetes type 2, controlled (H)     GERD (gastroesophageal reflux disease)     Herpes zoster 9/23/2016    History of seizure     History of substance abuse (H)     HIV (human immunodeficiency virus infection) (H)     HLD (hyperlipidemia)     Lung nodules     Periungual wart     Pneumonia 1/6/2019    PTSD (post-traumatic  stress disorder)     Sleep apnea     doesn't use CPAP       Past Surgical History   Past Surgical History:   Procedure Laterality Date    ANOSCOPY N/A 9/9/2020    Procedure: Exam Under Anesthesia, ANOSCOPY, fulgeration of rectal fissures with Rectal Biopsies;  Surgeon: Thanh Lundberg MD;  Location: [facility] OR    COLONOSCOPY Left 1/22/2016    Procedure: COMBINED COLONOSCOPY, SINGLE OR MULTIPLE BIOPSY/POLYPECTOMY BY BIOPSY;  Surgeon: Clark Saini MD;  Location: [facility] GI    ESOPHAGOSCOPY, GASTROSCOPY, DUODENOSCOPY (EGD), COMBINED N/A 6/6/2022    Procedure: ESOPHAGOGASTRODUODENOSCOPY, WITH BIOPSY;  Surgeon: Kecia Benítez MD;  Location: [facility] GI    HC EXPLORE UNDESC TESTIS,INGUIN/SCROTAL      LAPAROSCOPIC APPENDECTOMY N/A 1/31/2018    Procedure: LAPAROSCOPIC APPENDECTOMY;  LAPAROSCOPIC APPENDECTOMY;  Surgeon: Dawn Holt MD;  Location: [facility] OR    LAPAROSCOPY DIAGNOSTIC (GENERAL) N/A 7/26/2016    Procedure: LAPAROSCOPY DIAGNOSTIC (GENERAL);  Surgeon: Susannah Ariraga MD;  Location: [facility] OR    LAPAROSCOPY DIAGNOSTIC (GENERAL) N/A 4/16/2018    Procedure: LAPAROSCOPY DIAGNOSTIC (GENERAL);  Diagnostic laparoscopy and lysis of adhesions;  Surgeon: Prince Dowling MD;  Location: [facility] OR    OPTICAL TRACKING SYSTEM CRANIOTOMY, EXCISE TUMOR, COMBINED Left 4/10/2015    Procedure: COMBINED OPTICAL TRACKING SYSTEM CRANIOTOMY, EXCISE TUMOR;  Surgeon: Mirlande Colmenares MD;  Location: [facility] OR    REPAIR GAMEKEEPER'S THUMB Right 12/2/2016    Procedure: REPAIR LIGAMENT ULNAR COLLATERAL THUMB (GAMEKEEPER'S);  Surgeon: Evin Zamorano MD;  Location: [facility] OR    ZZC NONSPECIFIC PROCEDURE      right forearm fracture       Prior to Admission Medications   Prior to Admission Medications   Prescriptions Last Dose Informant Patient Reported? Taking?   alum & mag hydroxide-simethicone (MAALOX) 200-200-20 MG/5ML SUSP suspension   No No   Sig: Take 15 mLs by mouth 3 times daily as needed for indigestion    bictegravir-emtricitabine-tenofovir (BIKTARVY) -25 MG per tablet   No No   Sig: Take 1 tablet by mouth daily   blood glucose (NO BRAND SPECIFIED) lancets standard  Self No No   Sig: Use to test blood sugar 1 time daily or as directed.   blood glucose (NO BRAND SPECIFIED) test strip  Self No No   Sig: Use to test blood sugar 1 time daily or as directed.   buPROPion (WELLBUTRIN XL) 150 MG 24 hr tablet   No No   Sig: Take 1 tablet (150 mg) by mouth daily   doxycycline hyclate (VIBRAMYCIN) 100 MG capsule   No No   Sig: Take 1 capsule (100 mg) by mouth every 12 hours   famotidine (PEPCID) 40 MG tablet   No No   Sig: Take 1 tablet (40 mg) by mouth daily   glimepiride (AMARYL) 4 MG tablet   No No   Sig: Take 2 tablets (8 mg) by mouth every morning (before breakfast)   ibuprofen (ADVIL/MOTRIN) 600 MG tablet  Self No No   Sig: Take 1 tablet (600 mg) by mouth every 6 hours as needed for moderate pain (4-6)   insulin glargine (LANTUS PEN) 100 UNIT/ML pen   No No   Sig: Inject 32 Units Subcutaneous every morning   insulin pen needle (32G X 4 MM) 32G X 4 MM miscellaneous   No No   Sig: Use 4-5 pen needles daily or as directed.   mupirocin (BACTROBAN) 2 % external ointment   No No   Sig: Apply topically 3 times daily   ondansetron (ZOFRAN) 4 MG tablet   No No   Sig: Take 1 tablet (4 mg) by mouth every 6 hours as needed for nausea or vomiting   polyethylene glycol (MIRALAX) 17 GM/Dose powder   No No   Sig: Take 17 g by mouth 2 times daily   sitagliptin (JANUVIA) 100 MG tablet   No No   Sig: Take 1 tablet (100 mg) by mouth daily   tamsulosin (FLOMAX) 0.4 MG capsule   No No   Sig: Take 1 capsule (0.4 mg) by mouth daily      Facility-Administered Medications: None        Review of Systems    The 10 point Review of Systems is negative other than noted in the HPI or here.      Physical Exam   Vital Signs: Temp: 98.3  F (36.8  C) Temp src: Oral BP: (!) 146/87 Pulse: 98   Resp: 20 SpO2: 94 % O2 Device: None (Room air)    Weight:  164 lbs 9.6 oz    General Appearance: Alert and oriented, in moderate distress   Respiratory: clear to auscultation bilaterally  Cardiovascular: regular rate and rhythm, no murmurs/rubs/gallops  GI: diffusely tender to palpation  Skin: no rashes/lesions on visible skin   Other: No edema of BLE      Medical Decision Making       Please see A&P for additional details of medical decision making.      Data     I have personally reviewed the following data over the past 24 hrs:    7.2  \   13.9   / 360     135 96 (L) 15.8 /  307 (H)   3.9 26 0.89 \     ALT: 13 AST: 12 AP: 155 (H) TBILI: 0.4   ALB: 4.1 TOT PROTEIN: 7.7 LIPASE: 56     Procal: N/A CRP: N/A Lactic Acid: 2.7 (H)         Imaging results reviewed over the past 24 hrs:   Recent Results (from the past 24 hour(s))   CT Abdomen Pelvis w Contrast    Narrative    EXAM: CT ABDOMEN AND PELVIS WITH CONTRAST  LOCATION: [facility]  DATE/TIME: 11/08/2023 2:16 AM CST    INDICATION: Left lower quadrant pain. Constipation.  COMPARISON: 09/24/2023.    TECHNIQUE: CT scan of the abdomen and pelvis was performed following injection of IV contrast. Multiplanar reformats were obtained. Dose reduction techniques were used.  CONTRAST: 100 mL Isovue 370.    FINDINGS:    LOWER CHEST: Unremarkable.    HEPATOBILIARY: Small gallstone in the gallbladder.    SPLEEN: Unremarkable.    PANCREAS: Unremarkable.    ADRENAL GLANDS: Unremarkable.    KIDNEYS/BLADDER: Unremarkable.    BOWEL: The stomach is moderately distended with fluid. Moderate dilatation of a moderately long segment of small bowel in the anterior mid left hemiabdomen. There is an abrupt transition to a nondistended more distal small bowel. The colon is   nondistended. The appendix is not definitely visualized. No bowel wall thickening, pneumatosis or free intraperitoneal gas.    LYMPH NODES: Unremarkable.    PELVIC ORGANS: Moderate enlargement of the prostate gland. Partial  visualization of a penile prosthesis.    MUSCULOSKELETAL: No acute findings.    OTHER: Atherosclerotic calcification in the abdominal aorta.      Impression    IMPRESSION:   1.  Mid small bowel obstruction: A moderately long segment of moderately dilated fluid-filled small bowel is present in the left hemiabdomen and there is an abrupt transition to a decompressed more distal small bowel. Additionally, the stomach is   moderately distended with fluid.  2.  No other cause of acute pain identified in the abdomen or pelvis.   XR Abdomen Port 1 View    Narrative    EXAM: ABDOMEN SINGLE VIEW PORTABLE  LOCATION: [facility]  DATE/TIME: 11/08/2023 4:47 AM CST    INDICATION: Tube placement.  COMPARISON: 11/08/2023 at 0149 hours - CT abdomen and pelvis.      Impression    IMPRESSION: Interval placement of an enteric drainage tube with distal tip in the gastric body.

## 2023-11-08 NOTE — PROGRESS NOTES
"RN found Pt with NG tube in garbage and asked pt what happened. Pt stated \"I do not have a bowl obstruction anymore, I pulled it out.I want a popsicle\". Rn explained to pt that NG has been actively draining throughout shift. Pt responded \"No, I do not have a bowel obstruction lady you do not know what you are talking about\". Rn asked if pt wanted to leave then, pt replied \"No I am admitted, I am staying\". Rn explained that NG tube will likely need to be reinserted. Pt said \"no\". MD notified   "

## 2023-11-09 ENCOUNTER — HOSPITAL ENCOUNTER (INPATIENT)
Facility: CLINIC | Age: 58
LOS: 3 days | Discharge: HOME | End: 2023-11-12
Attending: EMERGENCY MEDICINE | Admitting: INTERNAL MEDICINE
Payer: COMMERCIAL

## 2023-11-09 ENCOUNTER — HOSPITAL ENCOUNTER (EMERGENCY)
Facility: CLINIC | Age: 58
Discharge: HOME | End: 2023-11-09
Attending: EMERGENCY MEDICINE | Admitting: EMERGENCY MEDICINE
Payer: COMMERCIAL

## 2023-11-09 ENCOUNTER — APPOINTMENT (OUTPATIENT)
Dept: CT IMAGING | Facility: CLINIC | Age: 58
End: 2023-11-09
Attending: EMERGENCY MEDICINE
Payer: COMMERCIAL

## 2023-11-09 VITALS
DIASTOLIC BLOOD PRESSURE: 91 MMHG | OXYGEN SATURATION: 96 % | HEART RATE: 103 BPM | SYSTOLIC BLOOD PRESSURE: 180 MMHG | TEMPERATURE: 98.4 F | RESPIRATION RATE: 16 BRPM

## 2023-11-09 DIAGNOSIS — Z79.4 TYPE 2 DIABETES MELLITUS WITH HYPERGLYCEMIA, WITH LONG-TERM CURRENT USE OF INSULIN (H): ICD-10-CM

## 2023-11-09 DIAGNOSIS — K56.609 SMALL BOWEL OBSTRUCTION (H): ICD-10-CM

## 2023-11-09 DIAGNOSIS — K56.609 SBO (SMALL BOWEL OBSTRUCTION) (H): Primary | ICD-10-CM

## 2023-11-09 DIAGNOSIS — Z21 HUMAN IMMUNODEFICIENCY VIRUS I INFECTION (H): ICD-10-CM

## 2023-11-09 DIAGNOSIS — E11.65 TYPE 2 DIABETES MELLITUS WITH HYPERGLYCEMIA, WITH LONG-TERM CURRENT USE OF INSULIN (H): ICD-10-CM

## 2023-11-09 LAB
ALBUMIN SERPL BCG-MCNC: 4.6 G/DL (ref 3.5–5.2)
ALP SERPL-CCNC: 138 U/L (ref 40–129)
ALT SERPL W P-5'-P-CCNC: 14 U/L (ref 0–70)
ANION GAP SERPL CALCULATED.3IONS-SCNC: 15 MMOL/L (ref 7–15)
AST SERPL W P-5'-P-CCNC: 17 U/L (ref 0–45)
BASOPHILS # BLD AUTO: 0 10E3/UL (ref 0–0.2)
BASOPHILS NFR BLD AUTO: 0 %
BILIRUB SERPL-MCNC: 1.1 MG/DL
BUN SERPL-MCNC: 14.5 MG/DL (ref 8–23)
CALCIUM SERPL-MCNC: 10.5 MG/DL (ref 8.6–10)
CHLORIDE SERPL-SCNC: 96 MMOL/L (ref 98–107)
CREAT SERPL-MCNC: 0.75 MG/DL (ref 0.67–1.17)
CREAT SERPL-MCNC: 0.8 MG/DL (ref 0.67–1.17)
DEPRECATED HCO3 PLAS-SCNC: 26 MMOL/L (ref 22–29)
EGFRCR SERPLBLD CKD-EPI 2021: >90 ML/MIN/1.73M2
EGFRCR SERPLBLD CKD-EPI 2021: >90 ML/MIN/1.73M2
EOSINOPHIL # BLD AUTO: 0.1 10E3/UL (ref 0–0.7)
EOSINOPHIL NFR BLD AUTO: 1 %
ERYTHROCYTE [DISTWIDTH] IN BLOOD BY AUTOMATED COUNT: 13.8 % (ref 10–15)
GLUCOSE BLDC GLUCOMTR-MCNC: 192 MG/DL (ref 70–99)
GLUCOSE BLDC GLUCOMTR-MCNC: 252 MG/DL (ref 70–99)
GLUCOSE SERPL-MCNC: 329 MG/DL (ref 70–99)
HBA1C MFR BLD: 10.7 %
HCT VFR BLD AUTO: 46.4 % (ref 40–53)
HGB BLD-MCNC: 15.3 G/DL (ref 13.3–17.7)
HOLD SPECIMEN: NORMAL
IMM GRANULOCYTES # BLD: 0 10E3/UL
IMM GRANULOCYTES NFR BLD: 0 %
LACTATE SERPL-SCNC: 1.7 MMOL/L (ref 0.7–2)
LIPASE SERPL-CCNC: 31 U/L (ref 13–60)
LYMPHOCYTES # BLD AUTO: 2.5 10E3/UL (ref 0.8–5.3)
LYMPHOCYTES NFR BLD AUTO: 25 %
MCH RBC QN AUTO: 29.1 PG (ref 26.5–33)
MCHC RBC AUTO-ENTMCNC: 33 G/DL (ref 31.5–36.5)
MCV RBC AUTO: 88 FL (ref 78–100)
MONOCYTES # BLD AUTO: 0.6 10E3/UL (ref 0–1.3)
MONOCYTES NFR BLD AUTO: 6 %
NEUTROPHILS # BLD AUTO: 6.7 10E3/UL (ref 1.6–8.3)
NEUTROPHILS NFR BLD AUTO: 68 %
NRBC # BLD AUTO: 0 10E3/UL
NRBC BLD AUTO-RTO: 0 /100
PLATELET # BLD AUTO: 421 10E3/UL (ref 150–450)
POTASSIUM SERPL-SCNC: 4 MMOL/L (ref 3.4–5.3)
PROT SERPL-MCNC: 8.7 G/DL (ref 6.4–8.3)
RBC # BLD AUTO: 5.26 10E6/UL (ref 4.4–5.9)
SODIUM SERPL-SCNC: 137 MMOL/L (ref 135–145)
WBC # BLD AUTO: 9.9 10E3/UL (ref 4–11)

## 2023-11-09 PROCEDURE — 96361 HYDRATE IV INFUSION ADD-ON: CPT | Performed by: EMERGENCY MEDICINE

## 2023-11-09 PROCEDURE — 82565 ASSAY OF CREATININE: CPT

## 2023-11-09 PROCEDURE — 99284 EMERGENCY DEPT VISIT MOD MDM: CPT | Performed by: EMERGENCY MEDICINE

## 2023-11-09 PROCEDURE — 83036 HEMOGLOBIN GLYCOSYLATED A1C: CPT

## 2023-11-09 PROCEDURE — 99285 EMERGENCY DEPT VISIT HI MDM: CPT | Performed by: EMERGENCY MEDICINE

## 2023-11-09 PROCEDURE — 85025 COMPLETE CBC W/AUTO DIFF WBC: CPT | Performed by: EMERGENCY MEDICINE

## 2023-11-09 PROCEDURE — 258N000003 HC RX IP 258 OP 636: Performed by: EMERGENCY MEDICINE

## 2023-11-09 PROCEDURE — 99207 PR APP CREDIT; MD BILLING SHARED VISIT: CPT | Mod: FS | Performed by: INTERNAL MEDICINE

## 2023-11-09 PROCEDURE — 36415 COLL VENOUS BLD VENIPUNCTURE: CPT

## 2023-11-09 PROCEDURE — 250N000011 HC RX IP 250 OP 636: Mod: JZ | Performed by: EMERGENCY MEDICINE

## 2023-11-09 PROCEDURE — 83605 ASSAY OF LACTIC ACID: CPT | Performed by: EMERGENCY MEDICINE

## 2023-11-09 PROCEDURE — 36415 COLL VENOUS BLD VENIPUNCTURE: CPT | Performed by: EMERGENCY MEDICINE

## 2023-11-09 PROCEDURE — 99223 1ST HOSP IP/OBS HIGH 75: CPT | Mod: FS

## 2023-11-09 PROCEDURE — 82962 GLUCOSE BLOOD TEST: CPT

## 2023-11-09 PROCEDURE — C9113 INJ PANTOPRAZOLE SODIUM, VIA: HCPCS | Mod: JZ

## 2023-11-09 PROCEDURE — 250N000009 HC RX 250: Performed by: EMERGENCY MEDICINE

## 2023-11-09 PROCEDURE — 74177 CT ABD & PELVIS W/CONTRAST: CPT

## 2023-11-09 PROCEDURE — 80053 COMPREHEN METABOLIC PANEL: CPT | Performed by: EMERGENCY MEDICINE

## 2023-11-09 PROCEDURE — 96374 THER/PROPH/DIAG INJ IV PUSH: CPT | Mod: 59 | Performed by: EMERGENCY MEDICINE

## 2023-11-09 PROCEDURE — 74177 CT ABD & PELVIS W/CONTRAST: CPT | Mod: 26 | Performed by: RADIOLOGY

## 2023-11-09 PROCEDURE — 99285 EMERGENCY DEPT VISIT HI MDM: CPT | Mod: 25 | Performed by: EMERGENCY MEDICINE

## 2023-11-09 PROCEDURE — 83690 ASSAY OF LIPASE: CPT | Performed by: EMERGENCY MEDICINE

## 2023-11-09 PROCEDURE — 120N000002 HC R&B MED SURG/OB UMMC

## 2023-11-09 PROCEDURE — 258N000003 HC RX IP 258 OP 636

## 2023-11-09 PROCEDURE — 250N000011 HC RX IP 250 OP 636: Performed by: EMERGENCY MEDICINE

## 2023-11-09 PROCEDURE — 250N000011 HC RX IP 250 OP 636: Mod: JZ

## 2023-11-09 PROCEDURE — 250N000013 HC RX MED GY IP 250 OP 250 PS 637: Performed by: EMERGENCY MEDICINE

## 2023-11-09 RX ORDER — HYDROMORPHONE HYDROCHLORIDE 1 MG/ML
0.5 INJECTION, SOLUTION INTRAMUSCULAR; INTRAVENOUS; SUBCUTANEOUS ONCE
Status: COMPLETED | OUTPATIENT
Start: 2023-11-09 | End: 2023-11-09

## 2023-11-09 RX ORDER — MAGNESIUM HYDROXIDE/ALUMINUM HYDROXICE/SIMETHICONE 120; 1200; 1200 MG/30ML; MG/30ML; MG/30ML
15 SUSPENSION ORAL 3 TIMES DAILY PRN
Status: DISCONTINUED | OUTPATIENT
Start: 2023-11-09 | End: 2023-11-12 | Stop reason: HOSPADM

## 2023-11-09 RX ORDER — PROCHLORPERAZINE 25 MG
25 SUPPOSITORY, RECTAL RECTAL EVERY 12 HOURS PRN
Status: DISCONTINUED | OUTPATIENT
Start: 2023-11-09 | End: 2023-11-12 | Stop reason: HOSPADM

## 2023-11-09 RX ORDER — IOPAMIDOL 755 MG/ML
100 INJECTION, SOLUTION INTRAVASCULAR ONCE
Status: COMPLETED | OUTPATIENT
Start: 2023-11-09 | End: 2023-11-09

## 2023-11-09 RX ORDER — PROCHLORPERAZINE MALEATE 10 MG
10 TABLET ORAL EVERY 6 HOURS PRN
Status: DISCONTINUED | OUTPATIENT
Start: 2023-11-09 | End: 2023-11-12 | Stop reason: HOSPADM

## 2023-11-09 RX ORDER — AMOXICILLIN 250 MG
2 CAPSULE ORAL 2 TIMES DAILY PRN
Status: DISCONTINUED | OUTPATIENT
Start: 2023-11-09 | End: 2023-11-12 | Stop reason: HOSPADM

## 2023-11-09 RX ORDER — ONDANSETRON 2 MG/ML
4 INJECTION INTRAMUSCULAR; INTRAVENOUS ONCE
Status: COMPLETED | OUTPATIENT
Start: 2023-11-09 | End: 2023-11-09

## 2023-11-09 RX ORDER — HYDROMORPHONE HYDROCHLORIDE 1 MG/ML
0.5 INJECTION, SOLUTION INTRAMUSCULAR; INTRAVENOUS; SUBCUTANEOUS EVERY 30 MIN PRN
Status: DISCONTINUED | OUTPATIENT
Start: 2023-11-09 | End: 2023-11-09 | Stop reason: HOSPADM

## 2023-11-09 RX ORDER — ONDANSETRON 2 MG/ML
4 INJECTION INTRAMUSCULAR; INTRAVENOUS EVERY 30 MIN PRN
Status: DISCONTINUED | OUTPATIENT
Start: 2023-11-09 | End: 2023-11-09 | Stop reason: HOSPADM

## 2023-11-09 RX ORDER — OXYCODONE HYDROCHLORIDE 5 MG/1
5 TABLET ORAL ONCE
Status: COMPLETED | OUTPATIENT
Start: 2023-11-09 | End: 2023-11-09

## 2023-11-09 RX ORDER — FAMOTIDINE 20 MG/1
40 TABLET, FILM COATED ORAL DAILY
Status: DISCONTINUED | OUTPATIENT
Start: 2023-11-10 | End: 2023-11-12 | Stop reason: HOSPADM

## 2023-11-09 RX ORDER — LIDOCAINE HYDROCHLORIDE 10 MG/ML
10 INJECTION, SOLUTION INFILTRATION; PERINEURAL ONCE
Status: COMPLETED | OUTPATIENT
Start: 2023-11-09 | End: 2023-11-09

## 2023-11-09 RX ORDER — ONDANSETRON 4 MG/1
4 TABLET, ORALLY DISINTEGRATING ORAL EVERY 6 HOURS PRN
Status: DISCONTINUED | OUTPATIENT
Start: 2023-11-09 | End: 2023-11-12 | Stop reason: HOSPADM

## 2023-11-09 RX ORDER — NICOTINE POLACRILEX 4 MG
15-30 LOZENGE BUCCAL
Status: DISCONTINUED | OUTPATIENT
Start: 2023-11-09 | End: 2023-11-12 | Stop reason: HOSPADM

## 2023-11-09 RX ORDER — DEXTROSE MONOHYDRATE 25 G/50ML
25-50 INJECTION, SOLUTION INTRAVENOUS
Status: DISCONTINUED | OUTPATIENT
Start: 2023-11-09 | End: 2023-11-12 | Stop reason: HOSPADM

## 2023-11-09 RX ORDER — POLYETHYLENE GLYCOL 3350 17 G/17G
17 POWDER, FOR SOLUTION ORAL 2 TIMES DAILY
Status: DISCONTINUED | OUTPATIENT
Start: 2023-11-09 | End: 2023-11-12 | Stop reason: HOSPADM

## 2023-11-09 RX ORDER — AMOXICILLIN 250 MG
1 CAPSULE ORAL 2 TIMES DAILY PRN
Status: DISCONTINUED | OUTPATIENT
Start: 2023-11-09 | End: 2023-11-12 | Stop reason: HOSPADM

## 2023-11-09 RX ORDER — SODIUM CHLORIDE, SODIUM LACTATE, POTASSIUM CHLORIDE, CALCIUM CHLORIDE 600; 310; 30; 20 MG/100ML; MG/100ML; MG/100ML; MG/100ML
INJECTION, SOLUTION INTRAVENOUS CONTINUOUS
Status: DISCONTINUED | OUTPATIENT
Start: 2023-11-09 | End: 2023-11-10

## 2023-11-09 RX ORDER — HYDROMORPHONE HYDROCHLORIDE 1 MG/ML
0.3 INJECTION, SOLUTION INTRAMUSCULAR; INTRAVENOUS; SUBCUTANEOUS EVERY 4 HOURS PRN
Status: DISCONTINUED | OUTPATIENT
Start: 2023-11-09 | End: 2023-11-10

## 2023-11-09 RX ORDER — ENOXAPARIN SODIUM 100 MG/ML
40 INJECTION SUBCUTANEOUS EVERY 24 HOURS
Status: DISCONTINUED | OUTPATIENT
Start: 2023-11-09 | End: 2023-11-10

## 2023-11-09 RX ORDER — ONDANSETRON 2 MG/ML
4 INJECTION INTRAMUSCULAR; INTRAVENOUS EVERY 6 HOURS PRN
Status: DISCONTINUED | OUTPATIENT
Start: 2023-11-09 | End: 2023-11-12 | Stop reason: HOSPADM

## 2023-11-09 RX ADMIN — HYDROMORPHONE HYDROCHLORIDE 0.3 MG: 1 INJECTION, SOLUTION INTRAMUSCULAR; INTRAVENOUS; SUBCUTANEOUS at 17:32

## 2023-11-09 RX ADMIN — HYDROMORPHONE HYDROCHLORIDE 0.5 MG: 1 INJECTION, SOLUTION INTRAMUSCULAR; INTRAVENOUS; SUBCUTANEOUS at 16:04

## 2023-11-09 RX ADMIN — SODIUM CHLORIDE 1000 ML: 9 INJECTION, SOLUTION INTRAVENOUS at 15:30

## 2023-11-09 RX ADMIN — OXYCODONE HYDROCHLORIDE 5 MG: 5 TABLET ORAL at 08:14

## 2023-11-09 RX ADMIN — SODIUM CHLORIDE 1000 ML: 9 INJECTION, SOLUTION INTRAVENOUS at 08:14

## 2023-11-09 RX ADMIN — SODIUM CHLORIDE, POTASSIUM CHLORIDE, SODIUM LACTATE AND CALCIUM CHLORIDE: 600; 310; 30; 20 INJECTION, SOLUTION INTRAVENOUS at 17:51

## 2023-11-09 RX ADMIN — ENOXAPARIN SODIUM 40 MG: 40 INJECTION SUBCUTANEOUS at 17:56

## 2023-11-09 RX ADMIN — ONDANSETRON 4 MG: 2 INJECTION INTRAMUSCULAR; INTRAVENOUS at 16:05

## 2023-11-09 RX ADMIN — LIDOCAINE HYDROCHLORIDE 10 ML: 10 INJECTION, SOLUTION INFILTRATION; PERINEURAL at 16:07

## 2023-11-09 RX ADMIN — PANTOPRAZOLE SODIUM 40 MG: 40 INJECTION, POWDER, FOR SOLUTION INTRAVENOUS at 20:49

## 2023-11-09 RX ADMIN — ONDANSETRON 4 MG: 2 INJECTION INTRAMUSCULAR; INTRAVENOUS at 08:14

## 2023-11-09 RX ADMIN — IOPAMIDOL 100 ML: 755 INJECTION, SOLUTION INTRAVENOUS at 11:39

## 2023-11-09 ASSESSMENT — ACTIVITIES OF DAILY LIVING (ADL)
ADLS_ACUITY_SCORE: 37
ADLS_ACUITY_SCORE: 37
ADLS_ACUITY_SCORE: 20
ADLS_ACUITY_SCORE: 35
ADLS_ACUITY_SCORE: 35
ADLS_ACUITY_SCORE: 20
ADLS_ACUITY_SCORE: 20

## 2023-11-09 NOTE — H&P
"[facility]    History and Physical - Hospitalist Service, GOLD TEAM        Date of Admission:  11/9/2023    Assessment & Plan      Andrew Collier is a 57 year old male admitted on 11/9/2023. He has a PMH significant for recurrent SBO, poorly controlled T2DM, HIV on Biktarvy c/b intermittent compliance, polysubstance use disorder (IV meth use), BPH, unstable housing. He is admitted for nausea/vomiting iso CT findings suggestive of SBO.    Brief Recent Hx:  11/8/23: Admitted to internal medicine on the [place] for SBO. Seen by surgery team, nonoperative management recommended and NG placed. Symptoms relieved w/ placement, pt pulled NG on his own and left AMA as he did not want to remain NPO.  11/9/23:   - Presented back to  ED w/ continued SBO symptoms. Underwent updated CT w/ similar results compared to previous day. However, pt left AMA again.  - Presented to outpatient clinic who sent patient [facility].   - Now in [facility] w/ NG tube placed and amenable to admit.    #SBO, recurrent  #Nausea w/ vomiting  #Abdominal pain, L sided  CT scan 11/9 w/ dilated loops of air and fluid filled small bowel w/ transition point in L hemiabdomen, consistent w/ SBO; no significant change comapred to 11/8 CT.  Pt presenting w/ nausea, vomiting and abdominal pain. Last emesis early 11/9 AM, last meal and bowel movement 3 days prior to admission. Is able to eat popsicles w/o issue, but no water.   Spoke w/ general surgery staff regarding need for re-consult as patient was seen on  on 11/8. Per staff, CT is \"bland\" and per CT report, stable from 11/8. GS has seen patient previously and usually keep him NPO until bowel function returns, then slowly advance diet. Do not see need for immediate re-consultation.   - Continue NG tube decompression  - NPO, no exceptions  - Anti-emetics prn  - Pain management: dilaudid 0.3mg Q4H  - LR @ 100mL/hr  - Spoke w/ general " "surgery (saw pt on EB 11/8), recs as follows   :: Still recommend non-operative management   :: Keep pt NPO until some bowel function returns, then slowly advance diet   :: Reach out to general surgery w/ questions/ guidance regarding next steps in management if needed   :: Consult general surgery if pt worsens clinically  - Pantoprazole IV 40mg once daily for reflux symptoms iso vomiting and NG tube    #T2DM, poorly controlled  A1c 8/6/23 of 13.3. PTA regimen: Lantus 32U every morning (pt reports taking 37U), glimepiride 8mg every morning, and sitagliptin 100mg daily.  - HOLD glimeperide and sitagliptin while inpatient  - Lantus 15U once daily for now 2/2 NPO status-- trend BG and adjust accordingly  - MSSI     #HIV  On Biktarvy, apparently w/ intermittent compliance. CD4 count 525 and RNA 19,900 (as of 8/27/23).  - Continue Biktarvy -25mg once daily    #Polysubstance use disorder (including IV meth use)  Reports using meth once a month. Administers intravenously, reports using clean needles. Offered addiction med/ risk reduction consult, but pt declined.   - Declined addiction medicine consult at this time    #Unstable housing  Per recent ED note, pt is homeless.   - Placed SW/CC consult to provide outpatient resources if desired        Diet: NPO for Medical/Clinical Reasons Except for: No Exceptions  DVT Prophylaxis: Enoxaparin (Lovenox) SQ  Potts Catheter: Not present  Lines: None     Cardiac Monitoring: None  Code Status: Full Code    Clinically Significant Risk Factors Present on Admission           # Hypercalcemia: Highest Ca = 10.5 mg/dL in last 2 days, will monitor as appropriate            # DMII: A1C = N/A within past 6 months   # Overweight: Estimated body mass index is 28.15 kg/m  as calculated from the following:    Height as of this encounter: 1.626 m (5' 4\").    Weight as of this encounter: 74.4 kg (164 lb).              Disposition Plan      Expected Discharge Date: 11/11/2023              " "  The patient's care was discussed with the Attending Physician, Dr. Hawkins .    Yuri Dukes PA-C  Hospitalist Service, [facility]  Securely message with [employer] (more info)  Text page via [employer] Paging/Directory   See signed in provider for up to date coverage information    ______________________________________________________________________    Chief Complaint   Nausea and vomiting     History is obtained from the patient, chart review    History of Present Illness   Andrew Collier is a 57 year old male who has a PMH significant for recurrent SBO, poorly controlled T2DM, HIV on Biktarvy c/b intermittent compliance, polysubstance use disorder (IV meth use), BPH, unstable housing.  Neris presents to the [facility] emergency department on 11/9/2023 with persistent nausea and vomiting and absence of bowel movement for 3 days.  He endorses a history of frequent small bowel obstructions, stating his last one was about a month ago.  States that his current symptoms are consistent with previous SBO episodes.  States that he already has some symptomatic relief after the NG tube placement.  Last episode of emesis was earlier this morning.  Last meal was about 3 days ago as was his last bowel movement.  He is also not passing any gas, which is \"how I know I am obstructed\".  He does request popsicles, though stated understanding when told we want him to be n.p.o.  Regarding his other medical history, he states he follows with his primary care for his diabetes and takes his insulin regularly.  He states he also takes his Biktarvy every day.  He denies alcohol use, stating \"I am in a diabetic, so I do not drink\".  States that he occasionally smokes cigarettes, denies need for nicotine patch.  Endorses occasional methamphetamine use, states that he injects methamphetamine about once a month.  Says he uses clean needles.  Declines addiction medicine consult at " this time.  No chest pain, no shortness of breath, no constitutional symptoms.        Past Medical History    Past Medical History:   Diagnosis Date    Acute appendicitis with localized peritonitis 1/31/2018    AIDS (H)     Allergic rhinitis due to other allergen     DNS    Anal dysplasia     Chronic abdominal pain     CNS toxoplasmosis (H)     COVID-19 1/11/2022    Diabetes type 2, controlled (H)     GERD (gastroesophageal reflux disease)     Herpes zoster 9/23/2016    History of seizure     History of substance abuse (H)     HIV (human immunodeficiency virus infection) (H)     HLD (hyperlipidemia)     Lung nodules     Periungual wart     Pneumonia 1/6/2019    PTSD (post-traumatic stress disorder)     Sleep apnea     doesn't use CPAP       Past Surgical History   Past Surgical History:   Procedure Laterality Date    ANOSCOPY N/A 9/9/2020    Procedure: Exam Under Anesthesia, ANOSCOPY, fulgeration of rectal fissures with Rectal Biopsies;  Surgeon: Thanh Lundberg MD;  Location: [facility] OR    COLONOSCOPY Left 1/22/2016    Procedure: COMBINED COLONOSCOPY, SINGLE OR MULTIPLE BIOPSY/POLYPECTOMY BY BIOPSY;  Surgeon: Clark Saini MD;  [facility]    ESOPHAGOSCOPY, GASTROSCOPY, DUODENOSCOPY (EGD), COMBINED N/A 6/6/2022    Procedure: ESOPHAGOGASTRODUODENOSCOPY, WITH BIOPSY;  Surgeon: Kecia Benítez MD;  Location: [facility] GI    HC EXPLORE UNDESC TESTIS,INGUIN/SCROTAL      LAPAROSCOPIC APPENDECTOMY N/A 1/31/2018    Procedure: LAPAROSCOPIC APPENDECTOMY;  LAPAROSCOPIC APPENDECTOMY;  Surgeon: Dawn Holt MD;  Location: [facility] OR    LAPAROSCOPY DIAGNOSTIC (GENERAL) N/A 7/26/2016    Procedure: LAPAROSCOPY DIAGNOSTIC (GENERAL);  Surgeon: Susannah Arriaga MD;  Location: [facility]    LAPAROSCOPY DIAGNOSTIC (GENERAL) N/A 4/16/2018    Procedure: LAPAROSCOPY DIAGNOSTIC (GENERAL);  Diagnostic laparoscopy and lysis of adhesions;  Surgeon: Prince Dowling MD;  Location: [facility] OR    OPTICAL TRACKING SYSTEM CRANIOTOMY,  EXCISE TUMOR, COMBINED Left 4/10/2015    Procedure: COMBINED OPTICAL TRACKING SYSTEM CRANIOTOMY, EXCISE TUMOR;  Surgeon: Mirlande Colmenares MD;  Location: [facility] OR    REPAIR GAMEKEEPER'S THUMB Right 12/2/2016    Procedure: REPAIR LIGAMENT ULNAR COLLATERAL THUMB (GAMEKEEPER'S);  Surgeon: Evin Zamorano MD;  Location:  OR    [facility] NONSPECIFIC PROCEDURE      right forearm fracture       Prior to Admission Medications   Prior to Admission Medications   Prescriptions Last Dose Informant Patient Reported? Taking?   alum & mag hydroxide-simethicone (MAALOX) 200-200-20 MG/5ML SUSP suspension More than a month  No Yes   Sig: Take 15 mLs by mouth 3 times daily as needed for indigestion   bictegravir-emtricitabine-tenofovir (BIKTARVY) -25 MG per tablet 11/9/2023  No Yes   Sig: Take 1 tablet by mouth daily   blood glucose (NO BRAND SPECIFIED) lancets standard  Self No No   Sig: Use to test blood sugar 1 time daily or as directed.   blood glucose (NO BRAND SPECIFIED) test strip  Self No No   Sig: Use to test blood sugar 1 time daily or as directed.   famotidine (PEPCID) 40 MG tablet 11/9/2023  No Yes   Sig: Take 1 tablet (40 mg) by mouth daily   insulin glargine (LANTUS PEN) 100 UNIT/ML pen 11/9/2023 at am  No Yes   Sig: Inject 32 Units Subcutaneous every morning   Patient taking differently: Inject 37 Units Subcutaneous every morning   insulin pen needle (32G X 4 MM) 32G X 4 MM miscellaneous   No No   Sig: Use 4-5 pen needles daily or as directed.   polyethylene glycol (MIRALAX) 17 GM/Dose powder Past Week  No Yes   Sig: Take 17 g by mouth 2 times daily      Facility-Administered Medications: None        Review of Systems    The 5 point Review of Systems is negative other than noted in the HPI or here.     Social History   I have reviewed this patient's social history and updated it with pertinent information if needed.  Social History     Tobacco Use    Smoking status: Some Days     Packs/day: 0.25     Years: 40.00  "    Additional pack years: 0.00     Total pack years: 10.00     Types: Cigarettes    Smokeless tobacco: Former   Substance Use Topics    Alcohol use: No     Alcohol/week: 0.0 standard drinks of alcohol     Comment: Last etoh in 2007    Drug use: Not Currently     Types: Marijuana, Methamphetamines         Allergies   Allergies   Allergen Reactions    Fentanyl Blisters     Per pt, after taking this medication had blisters develope    Tylenol [Acetaminophen] Itching    Dulaglutide Rash    Insulin Lispro Rash     Patient reported    Penicillin V Other (See Comments) and Rash     Diffuse maculopapular rash + feels \"high\", per pt.      ------------------------------------------------------------------------     Physical Exam   Vital Signs: Temp: 97.8  F (36.6  C)   BP: (!) 148/95     Resp: 17 SpO2: 97 % O2 Device: None (Room air)    Weight: 164 lbs 0 oz    General Appearance: Patient reclining comfortably in bed, NG tube in place.  No acute distress.  Slightly disheveled, but overall nontoxic-appearing.    Respiratory: Lungs CTA B, breathing comfortably on room air.  Cardiovascular: RRR, no murmurs appreciated.  GI: Abdomen is mildly distended, soft, tender to palpation mostly on the left side of the abdomen.  No involuntary guarding, no rigidity.  NG tube is in place, actively draining material.  Skin: Several round, approximately 1 cm in diameter, scars noted at the inferior midline aspect of the abdomen.  Some other small scars noted on the upper extremities.    Psych: Patient is alert and oriented.  He is interactive and cooperative with the provider.  Appropriate affect.  Neuro: No focal deficits noticed, CN II through XII grossly intact.    Medical Decision Making       55 MINUTES SPENT BY ME on the date of service doing chart review, history, exam, documentation & further activities per the note.      Data   ------------------------- PAST 24 HR DATA REVIEWED -----------------------------------------------    I " have personally reviewed the following data over the past 24 hrs:    9.9  \   15.3   / 421     137 96 (L) 14.5 /  252 (H)   4.0 26 0.75 \     ALT: 14 AST: 17 AP: 138 (H) TBILI: 1.1   ALB: 4.6 TOT PROTEIN: 8.7 (H) LIPASE: 31     TSH: N/A T4: N/A A1C: 10.7 (H)     Procal: N/A CRP: N/A Lactic Acid: 1.7         Imaging results reviewed over the past 24 hrs:   Recent Results (from the past 24 hour(s))   CT Abdomen Pelvis w Contrast    Narrative    EXAMINATION: CT ABDOMEN PELVIS W CONTRAST, 11/9/2023 12:13 PM    INDICATION: eval for SBO, recently seen for same    COMPARISON STUDY: CT abdomen and pelvis 11/8/2023    TECHNIQUE: CT scan of the chest, abdomen and pelvis was performed on  multidetector CT scanner using volumetric acquisition technique and  images were reconstructed in multiple planes with variable thickness  and reviewed on dedicated workstations.     CONTRAST: iopamidol (ISOVUE-370) solution 100 mL.   Without oral  contrast.    CT scan radiation dose is optimized to minimum requisite dose using  automated dose modulation techniques.    FINDINGS:    Lower chest: Bibasilar atelectasis. No pleural effusion.    Abdomen and Pelvis:  Liver: No mass. No intrahepatic biliary ductal dilation.    Biliary System: Normal gallbladder. No extrahepatic biliary ductal  dilation.    Pancreas: No mass or pancreatic ductal dilation.    Adrenal glands: No mass or nodules    Spleen: Normal.    Kidneys: No suspicious mass, obstructing calculus or hydronephrosis.    Gastrointestinal tract: Dilated fluid and air filled loops of small  bowel are seen, with a transition point in the left hemiabdomen  (series 6 image 28), likely in the distal small bowel. No pneumatosis  or portal venous gas. No free air. Normal caliber large bowel.  Diverticulosis without diverticulitis. The appendix is not seen.    Mesentery/peritoneum/retroperitoneum: No mass. No free fluid or air.    Lymph nodes: No significant lymphadenopathy.    Vasculature:  Patent major abdominal vasculature.    Pelvis: Urinary bladder is normal.  Penile prosthesis.   Prostatomegaly.    Osseous structures: No aggressive or acute osseous lesion.      Soft tissues: Within normal limits.      Impression    IMPRESSION:   Dilated loops of air and fluid-filled small bowel with transition  point in the left hemiabdomen concerning for small bowel obstruction,  not significant changed from day prior.    I have personally reviewed the examination and initial interpretation  and I agree with the findings.    ALFREDA SHIPLEY MD         SYSTEM ID:  [id number]     Recent Labs   Lab 11/09/23  1738 11/09/23  1703 11/09/23  0706 11/08/23  1749 11/08/23  0442 11/08/23  0101   WBC  --   --  9.9  --   --  7.2   HGB  --   --  15.3  --   --  13.9   MCV  --   --  88  --   --  85   PLT  --   --  421  --   --  360   NA  --   --  137  --   --  135   POTASSIUM  --   --  4.0  --   --  3.9   CHLORIDE  --   --  96*  --   --  96*   CO2  --   --  26  --   --  26   BUN  --   --  14.5  --   --  15.8   CR  --  0.75 0.80  --   --  0.89   ANIONGAP  --   --  15  --   --  13   LINDA  --   --  10.5*  --   --  9.9   *  --  329* 238*   < > 518*   ALBUMIN  --   --  4.6  --   --  4.1   PROTTOTAL  --   --  8.7*  --   --  7.7   BILITOTAL  --   --  1.1  --   --  0.4   ALKPHOS  --   --  138*  --   --  155*   ALT  --   --  14  --   --  13   AST  --   --  17  --   --  12   LIPASE  --   --  31  --   --  56    < > = values in this interval not displayed.

## 2023-11-09 NOTE — PROGRESS NOTES
Called regarding Andrew and his SBO.  I have seen him for this in the past.    I reviewed his CT scan and labwork, vitals.  WBC is normal, lactate is normal.  CT scan with dilated portion of small bowel with trace mesenteric edema.    They are planning NG tube decompression and NPO- which is appropriate.  Will then wait for bowel function to return and then plan for slow diet advancement.

## 2023-11-09 NOTE — ED PROVIDER NOTES
ED PROVIDER NOTE  November 9, 2023  History     Chief Complaint   Patient presents with    Abdominal Pain     HPI  Andrew Collier is a 57 year old male who has a history significant for opiate use disorder, amphetamine use disorder, recurrent SBO with chronic SBO who arrived yesterday for evaluation of abdominal pain.  There is CT imaging demonstrating concerning findings for probable chronic bowel obstruction.  The patient had placement of an NG tube.  Per chart review there was some disagreement between nursing staff.  The patient demanded to leave after being told he need to be n.p.o. until physician approved.  Patient left.  He then returned this morning with escalation of abdominal pain and nausea.  He was reevaluated and had repeat laboratory studies and CT imaging of the [facility].  The patient then apparently left again stating that he was not treated well and apparently went to a clinic where EMS was called to transfer him to our campus.  Upon arrival patient reports ongoing abdominal pain rated at moderate to significant intensity.  No obvious exacerbating leaving symptoms.  Patient reports ongoing nausea.  He denies bowel movement.  He reports no recent trauma.  No melena or hematochezia.  Patient denies high fevers or shaking chills.      Past Medical History  Past Medical History:   Diagnosis Date    Acute appendicitis with localized peritonitis 1/31/2018    AIDS (H)     Allergic rhinitis due to other allergen     DNS    Anal dysplasia     Chronic abdominal pain     CNS toxoplasmosis (H)     COVID-19 1/11/2022    Diabetes type 2, controlled (H)     GERD (gastroesophageal reflux disease)     Herpes zoster 9/23/2016    History of seizure     History of substance abuse (H)     HIV (human immunodeficiency virus infection) (H)     HLD (hyperlipidemia)     Lung nodules     Periungual wart     Pneumonia 1/6/2019    PTSD (post-traumatic stress disorder)     Sleep apnea      doesn't use CPAP     Past Surgical History:   Procedure Laterality Date    ANOSCOPY N/A 9/9/2020    Procedure: Exam Under Anesthesia, ANOSCOPY, fulgeration of rectal fissures with Rectal Biopsies;  Surgeon: Thanh Lundberg MD;  Location: [facility]    COLONOSCOPY Left 1/22/2016    Procedure: COMBINED COLONOSCOPY, SINGLE OR MULTIPLE BIOPSY/POLYPECTOMY BY BIOPSY;  Surgeon: Clark Saini MD;  Location: [facility]    ESOPHAGOSCOPY, GASTROSCOPY, DUODENOSCOPY (EGD), COMBINED N/A 6/6/2022    Procedure: ESOPHAGOGASTRODUODENOSCOPY, WITH BIOPSY;  Surgeon: Kecia Benítez MD;  Location: [facility]    HC EXPLORE UNDESC TESTIS,INGUIN/SCROTAL      LAPAROSCOPIC APPENDECTOMY N/A 1/31/2018    Procedure: LAPAROSCOPIC APPENDECTOMY;  LAPAROSCOPIC APPENDECTOMY;  Surgeon: Dawn Holt MD;  Location: [facility]    LAPAROSCOPY DIAGNOSTIC (GENERAL) N/A 7/26/2016    Procedure: LAPAROSCOPY DIAGNOSTIC (GENERAL);  Surgeon: Susannah Arriaga MD;  Location: [facility]    LAPAROSCOPY DIAGNOSTIC (GENERAL) N/A 4/16/2018    Procedure: LAPAROSCOPY DIAGNOSTIC (GENERAL);  Diagnostic laparoscopy and lysis of adhesions;  Surgeon: Prince Dowling MD;  Location: [facility]    OPTICAL TRACKING SYSTEM CRANIOTOMY, EXCISE TUMOR, COMBINED Left 4/10/2015    Procedure: COMBINED OPTICAL TRACKING SYSTEM CRANIOTOMY, EXCISE TUMOR;  Surgeon: Mirlande Colmenares MD;  Location: [facility]    REPAIR GAMEKEEPER'S THUMB Right 12/2/2016    Procedure: REPAIR LIGAMENT ULNAR COLLATERAL THUMB (GAMEKEEPER'S);  Surgeon: Evin Zamorano MD;  Location: [facility]    ZZC NONSPECIFIC PROCEDURE      right forearm fracture     alum & mag hydroxide-simethicone (MAALOX) 200-200-20 MG/5ML SUSP suspension  bictegravir-emtricitabine-tenofovir (BIKTARVY) -25 MG per tablet  blood glucose (NO BRAND SPECIFIED) lancets standard  blood glucose (NO BRAND SPECIFIED) test strip  buPROPion (WELLBUTRIN XL) 150 MG 24 hr tablet  doxycycline hyclate (VIBRAMYCIN) 100 MG capsule  famotidine  "(PEPCID) 40 MG tablet  glimepiride (AMARYL) 4 MG tablet  ibuprofen (ADVIL/MOTRIN) 600 MG tablet  insulin glargine (LANTUS PEN) 100 UNIT/ML pen  insulin pen needle (32G X 4 MM) 32G X 4 MM miscellaneous  mupirocin (BACTROBAN) 2 % external ointment  ondansetron (ZOFRAN) 4 MG tablet  polyethylene glycol (MIRALAX) 17 GM/Dose powder  sitagliptin (JANUVIA) 100 MG tablet  tamsulosin (FLOMAX) 0.4 MG capsule      Allergies   Allergen Reactions    Fentanyl Blisters     Per pt, after taking this medication had blisters develope    Tylenol [Acetaminophen] Itching    Dulaglutide Rash    Insulin Lispro Rash     Patient reported    Penicillin V Other (See Comments) and Rash     Diffuse maculopapular rash + feels \"high\", per pt.      Family History  Family History   Problem Relation Age of Onset    Diabetes Brother     Diabetes Father     Alzheimer Disease Father     Unknown/Adopted Mother     Diabetes Paternal Grandfather     Cancer No family hx of         no skin cancer    Skin Cancer No family hx of         no famiy hx of skin cancer    Glaucoma No family hx of     Macular Degeneration No family hx of      Social History   Social History     Tobacco Use    Smoking status: Some Days     Packs/day: 0.25     Years: 40.00     Additional pack years: 0.00     Total pack years: 10.00     Types: Cigarettes    Smokeless tobacco: Former   Substance Use Topics    Alcohol use: No     Alcohol/week: 0.0 standard drinks of alcohol     Comment: Last etoh in 2007    Drug use: Not Currently     Types: Marijuana, Methamphetamines         A medically appropriate review of systems was performed with pertinent positives and negatives noted in the HPI, and all other systems negative.      Physical Exam          Physical Exam  Vitals and nursing note reviewed.   Constitutional:       Appearance: Normal appearance. He is not ill-appearing, toxic-appearing or diaphoretic.   HENT:      Head: Normocephalic and atraumatic.      Right Ear: External ear " normal.      Left Ear: External ear normal.      Nose: Nose normal. No congestion.      Mouth/Throat:      Mouth: Mucous membranes are moist.      Pharynx: Oropharynx is clear. No oropharyngeal exudate.   Eyes:      Extraocular Movements: Extraocular movements intact.      Conjunctiva/sclera: Conjunctivae normal.      Pupils: Pupils are equal, round, and reactive to light.   Cardiovascular:      Rate and Rhythm: Normal rate.      Pulses: Normal pulses.      Heart sounds: Normal heart sounds. No murmur heard.     No friction rub.   Pulmonary:      Effort: Pulmonary effort is normal. No respiratory distress.      Breath sounds: No stridor. No wheezing, rhonchi or rales.   Abdominal:      General: Abdomen is flat. There is no distension.      Tenderness: There is abdominal tenderness. There is no guarding or rebound.   Musculoskeletal:         General: No deformity or signs of injury. Normal range of motion.      Cervical back: Normal range of motion.   Skin:     General: Skin is warm.      Capillary Refill: Capillary refill takes less than 2 seconds.      Coloration: Skin is not pale.      Findings: No bruising or erythema.   Neurological:      General: No focal deficit present.      Mental Status: He is alert.      Cranial Nerves: No cranial nerve deficit.      Motor: No weakness.   Psychiatric:         Mood and Affect: Mood normal.         Behavior: Behavior normal.         ED Course        Procedures         Results for orders placed or performed during the hospital encounter of 11/09/23 (from the past 24 hour(s))   [place] Draw    Narrative    The following orders were created for panel order [place] Draw.  Procedure                               Abnormality         Status                     ---------                               -----------         ------                     Extra Blue Top Tube[[id number]]                              Final result               Extra Red Top Tube[[id number]]                                Final result               Extra Green Top (Lithium...[[id number]]                      Final result               Extra Purple Top Tube[[id number]]                            Final result                 Please view results for these tests on the individual orders.   Extra Blue Top Tube   Result Value Ref Range    Hold Specimen JIC    Extra Red Top Tube   Result Value Ref Range    Hold Specimen JIC    Extra Green Top (Lithium Heparin) Tube   Result Value Ref Range    Hold Specimen JIC    Extra Purple Top Tube   Result Value Ref Range    Hold Specimen JIC    CBC with platelets differential    Narrative    The following orders were created for panel order CBC with platelets differential.  Procedure                               Abnormality         Status                     ---------                               -----------         ------                     CBC with platelets and d...[[id number]]                      Final result                 Please view results for these tests on the individual orders.   Comprehensive metabolic panel   Result Value Ref Range    Sodium 137 135 - 145 mmol/L    Potassium 4.0 3.4 - 5.3 mmol/L    Carbon Dioxide (CO2) 26 22 - 29 mmol/L    Anion Gap 15 7 - 15 mmol/L    Urea Nitrogen 14.5 8.0 - 23.0 mg/dL    Creatinine 0.80 0.67 - 1.17 mg/dL    GFR Estimate >90 >60 mL/min/1.73m2    Calcium 10.5 (H) 8.6 - 10.0 mg/dL    Chloride 96 (L) 98 - 107 mmol/L    Glucose 329 (H) 70 - 99 mg/dL    Alkaline Phosphatase 138 (H) 40 - 129 U/L    AST 17 0 - 45 U/L    ALT 14 0 - 70 U/L    Protein Total 8.7 (H) 6.4 - 8.3 g/dL    Albumin 4.6 3.5 - 5.2 g/dL    Bilirubin Total 1.1 <=1.2 mg/dL   Lipase   Result Value Ref Range    Lipase 31 13 - 60 U/L   CBC with platelets and differential   Result Value Ref Range    WBC Count 9.9 4.0 - 11.0 10e3/uL    RBC Count 5.26 4.40 - 5.90 10e6/uL    Hemoglobin 15.3 13.3 - 17.7 g/dL    Hematocrit 46.4 40.0 - 53.0 %    MCV 88 78 - 100 fL    MCH 29.1 26.5 - 33.0 pg     MCHC 33.0 31.5 - 36.5 g/dL    RDW 13.8 10.0 - 15.0 %    Platelet Count 421 150 - 450 10e3/uL    % Neutrophils 68 %    % Lymphocytes 25 %    % Monocytes 6 %    % Eosinophils 1 %    % Basophils 0 %    % Immature Granulocytes 0 %    NRBCs per 100 WBC 0 <1 /100    Absolute Neutrophils 6.7 1.6 - 8.3 10e3/uL    Absolute Lymphocytes 2.5 0.8 - 5.3 10e3/uL    Absolute Monocytes 0.6 0.0 - 1.3 10e3/uL    Absolute Eosinophils 0.1 0.0 - 0.7 10e3/uL    Absolute Basophils 0.0 0.0 - 0.2 10e3/uL    Absolute Immature Granulocytes 0.0 <=0.4 10e3/uL    Absolute NRBCs 0.0 10e3/uL   Lactic acid whole blood   Result Value Ref Range    Lactic Acid 1.7 0.7 - 2.0 mmol/L   CT Abdomen Pelvis w Contrast    Narrative    EXAMINATION: CT ABDOMEN PELVIS W CONTRAST, 11/9/2023 12:13 PM    INDICATION: eval for SBO, recently seen for same    COMPARISON STUDY: CT abdomen and pelvis 11/8/2023    TECHNIQUE: CT scan of the chest, abdomen and pelvis was performed on  multidetector CT scanner using volumetric acquisition technique and  images were reconstructed in multiple planes with variable thickness  and reviewed on dedicated workstations.     CONTRAST: iopamidol (ISOVUE-370) solution 100 mL.   Without oral  contrast.    CT scan radiation dose is optimized to minimum requisite dose using  automated dose modulation techniques.    FINDINGS:    Lower chest: Bibasilar atelectasis. No pleural effusion.    Abdomen and Pelvis:  Liver: No mass. No intrahepatic biliary ductal dilation.    Biliary System: Normal gallbladder. No extrahepatic biliary ductal  dilation.    Pancreas: No mass or pancreatic ductal dilation.    Adrenal glands: No mass or nodules    Spleen: Normal.    Kidneys: No suspicious mass, obstructing calculus or hydronephrosis.    Gastrointestinal tract: Dilated fluid and air filled loops of small  bowel are seen, with a transition point in the left hemiabdomen  (series 6 image 28), likely in the distal small bowel. No pneumatosis  or portal venous  gas. No free air. Normal caliber large bowel.  Diverticulosis without diverticulitis. The appendix is not seen.    Mesentery/peritoneum/retroperitoneum: No mass. No free fluid or air.    Lymph nodes: No significant lymphadenopathy.    Vasculature: Patent major abdominal vasculature.    Pelvis: Urinary bladder is normal.  Penile prosthesis.   Prostatomegaly.    Osseous structures: No aggressive or acute osseous lesion.      Soft tissues: Within normal limits.      Impression    IMPRESSION:   Dilated loops of air and fluid-filled small bowel with transition  point in the left hemiabdomen concerning for small bowel obstruction,  not significant changed from day prior.    I have personally reviewed the examination and initial interpretation  and I agree with the findings.    ALFREDA SHIPLEY MD         SYSTEM ID:  [id number]     *Note: Due to a large number of results and/or encounters for the requested time period, some results have not been displayed. A complete set of results can be found in Results Review.     Medications   HYDROmorphone (PF) (DILAUDID) injection 0.5 mg (has no administration in time range)   ondansetron (ZOFRAN) injection 4 mg (has no administration in time range)             Critical care was not performed.     Medical Decision Making  The patient's presentation was of moderate complexity (an acute complicated injury).    The patient's evaluation involved:  review of external note(s) from 3+ sources (see separate area of note for details)  review of 3+ test result(s) ordered prior to this encounter (see separate area of note for details)    The patient's management necessitated high risk (a decision regarding hospitalization).    Assessments & Plan (with Medical Decision Making)     Andrew Collier is a 57 year old male who has a history significant for opiate use disorder, amphetamine use disorder, recurrent SBO with chronic SBO who arrived yesterday for evaluation of abdominal pain.   Upon arrival patient noted to be alert.  Presently afebrile lying supine in bed.  Patient appears to be somewhat uncomfortable is nontoxic.  GCS 15.  No evidence of neurovascular deficit.  I have reviewed documentation including initial emergency department visit, hospitalization notes, general surgery consultation note indicating nonsurgical intervention with NG tube placement as well as repeat ED evaluation today.  On my examination patient does have some tenderness.  No involuntary guarding or rebound tenderness.  I reviewed CT documenting concern for SBO.  I would agree with plans for IV fluids, continued NG tube decompression antiemetics and multimodal pain control.  As per general surgery recommendations plan for medical admission.  At this time patient agreeable to coming into the hospital.    I have reviewed the nursing notes.    I have reviewed the findings, diagnosis, plan and need for follow up with the patient.    New Prescriptions    No medications on file       Final diagnoses:   Small bowel obstruction (H)       KOFI TONEY MD    11/9/2023   [facility] EMERGENCY DEPARTMENT     Kofi Toney MD  11/09/23 1363

## 2023-11-09 NOTE — ED TRIAGE NOTES
Pt was at [facility] ED, pt stated he has chronic bowel obstruction, had one 3 months ago. Per pt, has not eaten for 3 days, diabetic . Pt called EMS from specialty care clinic

## 2023-11-09 NOTE — ED PROVIDER NOTES
ED Provider Note  November 9, 2023  [facility]      History     Chief Complaint: Abdominal Pain (Diffuse abd pain. NV. Ongoing all night.vomiting in triage.)      The history is provided by the patient and medical records.     Andrew Collier is a 57 year old male with complex past medical history including poorly controlled type 2 diabetes, HIV on Biktarvy complicated by intermittent compliance (absolute CD4 count 525 and RNA 71861 copies/mL on 8/27/2023), polysubstance use disorder, recurrence of bowel obstructions, BPH, IV meth use, homelessness, presenting to the ED with abdominal pain, nausea, and vomiting.    Patient was recently admitted to the internal medicine service for diagnosed small bowel obstruction through the emergency department.  NG tube had been placed patient felt dramatically improved and ultimately remove the NG tube by self.  Physician and nursing notes reviewed.  He ultimately left AGAINST MEDICAL ADVICE yesterday evening.    We presenting now this morning with abdominal pain concerning for bowel obstruction    No chest pain or shortness of breath no fevers or chills.  No new trauma.        Past Medical History  Past Medical History:   Diagnosis Date    Acute appendicitis with localized peritonitis 1/31/2018    AIDS (H)     Allergic rhinitis due to other allergen     DNS    Anal dysplasia     Chronic abdominal pain     CNS toxoplasmosis (H)     COVID-19 1/11/2022    Diabetes type 2, controlled (H)     GERD (gastroesophageal reflux disease)     Herpes zoster 9/23/2016    History of seizure     History of substance abuse (H)     HIV (human immunodeficiency virus infection) (H)     HLD (hyperlipidemia)     Lung nodules     Periungual wart     Pneumonia 1/6/2019    PTSD (post-traumatic stress disorder)     Sleep apnea     doesn't use CPAP     Past Surgical History:   Procedure Laterality Date    ANOSCOPY N/A 9/9/2020    Procedure: Exam Under Anesthesia,  ANOSCOPY, fulgeration of rectal fissures with Rectal Biopsies;  Surgeon: Thanh Lundberg MD;  Location: [facility] OR    COLONOSCOPY Left 1/22/2016    Procedure: COMBINED COLONOSCOPY, SINGLE OR MULTIPLE BIOPSY/POLYPECTOMY BY BIOPSY;  Surgeon: Clark Saini MD;  Location: [facility] GI    ESOPHAGOSCOPY, GASTROSCOPY, DUODENOSCOPY (EGD), COMBINED N/A 6/6/2022    Procedure: ESOPHAGOGASTRODUODENOSCOPY, WITH BIOPSY;  Surgeon: Kecia Benítez MD;  Location: [facility] GI    HC EXPLORE UNDESC TESTIS,INGUIN/SCROTAL      LAPAROSCOPIC APPENDECTOMY N/A 1/31/2018    Procedure: LAPAROSCOPIC APPENDECTOMY;  LAPAROSCOPIC APPENDECTOMY;  Surgeon: Dawn Holt MD;  Location: [facility]    LAPAROSCOPY DIAGNOSTIC (GENERAL) N/A 7/26/2016    Procedure: LAPAROSCOPY DIAGNOSTIC (GENERAL);  Surgeon: Susannah Arriaga MD;  Location: [facility]    LAPAROSCOPY DIAGNOSTIC (GENERAL) N/A 4/16/2018    Procedure: LAPAROSCOPY DIAGNOSTIC (GENERAL);  Diagnostic laparoscopy and lysis of adhesions;  Surgeon: Prince Dowling MD;  Location: [facility] OR    OPTICAL TRACKING SYSTEM CRANIOTOMY, EXCISE TUMOR, COMBINED Left 4/10/2015    Procedure: COMBINED OPTICAL TRACKING SYSTEM CRANIOTOMY, EXCISE TUMOR;  Surgeon: Mirlande Colmenares MD;  Location: [facility] OR    REPAIR GAMEKEEPER'S THUMB Right 12/2/2016    Procedure: REPAIR LIGAMENT ULNAR COLLATERAL THUMB (GAMEKEEPER'S);  Surgeon: Evin Zamoarno MD;  [facility]    ZZC NONSPECIFIC PROCEDURE      right forearm fracture     alum & mag hydroxide-simethicone (MAALOX) 200-200-20 MG/5ML SUSP suspension  bictegravir-emtricitabine-tenofovir (BIKTARVY) -25 MG per tablet  blood glucose (NO BRAND SPECIFIED) lancets standard  blood glucose (NO BRAND SPECIFIED) test strip  buPROPion (WELLBUTRIN XL) 150 MG 24 hr tablet  doxycycline hyclate (VIBRAMYCIN) 100 MG capsule  famotidine (PEPCID) 40 MG tablet  glimepiride (AMARYL) 4 MG tablet  ibuprofen (ADVIL/MOTRIN) 600 MG tablet  insulin glargine (LANTUS PEN) 100 UNIT/ML  "pen  insulin pen needle (32G X 4 MM) 32G X 4 MM miscellaneous  mupirocin (BACTROBAN) 2 % external ointment  ondansetron (ZOFRAN) 4 MG tablet  polyethylene glycol (MIRALAX) 17 GM/Dose powder  sitagliptin (JANUVIA) 100 MG tablet  tamsulosin (FLOMAX) 0.4 MG capsule      Allergies   Allergen Reactions    Fentanyl Blisters     Per pt, after taking this medication had blisters develope    Tylenol [Acetaminophen] Itching    Dulaglutide Rash    Insulin Lispro Rash     Patient reported    Penicillin V Other (See Comments) and Rash     Diffuse maculopapular rash + feels \"high\", per pt.      Family History  Family History   Problem Relation Age of Onset    Diabetes Brother     Diabetes Father     Alzheimer Disease Father     Unknown/Adopted Mother     Diabetes Paternal Grandfather     Cancer No family hx of         no skin cancer    Skin Cancer No family hx of         no famiy hx of skin cancer    Glaucoma No family hx of     Macular Degeneration No family hx of      Social History   Social History     Tobacco Use    Smoking status: Some Days     Packs/day: 0.25     Years: 40.00     Additional pack years: 0.00     Total pack years: 10.00     Types: Cigarettes    Smokeless tobacco: Former   Substance Use Topics    Alcohol use: No     Alcohol/week: 0.0 standard drinks of alcohol     Comment: Last etoh in 2007    Drug use: Not Currently     Types: Marijuana, Methamphetamines        Past medical history, past surgical history, medications, allergies, family history, and social history were reviewed with the patient. No additional pertinent items.      A medically appropriate review of systems was performed with pertinent positives and negatives noted in the HPI, and all other systems negative.    Physical Exam   BP (!) 180/91   Pulse 103   Temp 98.4  F (36.9  C)   Resp 16   SpO2 96%     GEN: Uncomfortable appearing, non toxic, cooperative and conversant.   HEENT: The head is normocephalic and atraumatic. Pupils are equal round " and reactive to light. Extraocular motions are intact. There is no facial swelling. The neck is nontender and supple.   CV: Regular rate and rhythm. 2+ radial pulses bilaterally.  PULM: Clear to auscultation bilaterally.  ABD: Soft, left sided ttp, nondistended.   EXT: Full range of motion.  No edema.  NEURO: Cranial nerves II through XII are intact and symmetric. Bilateral upper and lower extremities grossly show full range of motion without any focal deficits.   PSYCH: Uncomfortable      ED Course, Procedures, & Data      Procedures                    Results for orders placed or performed during the hospital encounter of 11/09/23   CT Abdomen Pelvis w Contrast     Status: None    Narrative    EXAMINATION: CT ABDOMEN PELVIS W CONTRAST, 11/9/2023 12:13 PM    INDICATION: eval for SBO, recently seen for same    COMPARISON STUDY: CT abdomen and pelvis 11/8/2023    TECHNIQUE: CT scan of the chest, abdomen and pelvis was performed on  multidetector CT scanner using volumetric acquisition technique and  images were reconstructed in multiple planes with variable thickness  and reviewed on dedicated workstations.     CONTRAST: iopamidol (ISOVUE-370) solution 100 mL.   Without oral  contrast.    CT scan radiation dose is optimized to minimum requisite dose using  automated dose modulation techniques.    FINDINGS:    Lower chest: Bibasilar atelectasis. No pleural effusion.    Abdomen and Pelvis:  Liver: No mass. No intrahepatic biliary ductal dilation.    Biliary System: Normal gallbladder. No extrahepatic biliary ductal  dilation.    Pancreas: No mass or pancreatic ductal dilation.    Adrenal glands: No mass or nodules    Spleen: Normal.    Kidneys: No suspicious mass, obstructing calculus or hydronephrosis.    Gastrointestinal tract: Dilated fluid and air filled loops of small  bowel are seen, with a transition point in the left hemiabdomen  (series 6 image 28), likely in the distal small bowel. No pneumatosis  or portal  venous gas. No free air. Normal caliber large bowel.  Diverticulosis without diverticulitis. The appendix is not seen.    Mesentery/peritoneum/retroperitoneum: No mass. No free fluid or air.    Lymph nodes: No significant lymphadenopathy.    Vasculature: Patent major abdominal vasculature.    Pelvis: Urinary bladder is normal.  Penile prosthesis.   Prostatomegaly.    Osseous structures: No aggressive or acute osseous lesion.      Soft tissues: Within normal limits.      Impression    IMPRESSION:   Dilated loops of air and fluid-filled small bowel with transition  point in the left hemiabdomen concerning for small bowel obstruction,  not significant changed from day prior.    I have personally reviewed the examination and initial interpretation  and I agree with the findings.    ALFREDA SHIPLEY MD         SYSTEM ID:  [id number]   [place] Draw     Status: None    Narrative    The following orders were created for panel order [place] Draw.  Procedure                               Abnormality         Status                     ---------                               -----------         ------                     Extra Blue Top Tube[[id number]]                              Final result               Extra Red Top Tube[[id number]]                               Final result               Extra Green Top (Lithium...[[id number]]                      Final result               Extra Purple Top Tube[[id number]]                            Final result                 Please view results for these tests on the individual orders.   Extra Blue Top Tube     Status: None   Result Value Ref Range    Hold Specimen JIC    Extra Red Top Tube     Status: None   Result Value Ref Range    Hold Specimen JIC    Extra Green Top (Lithium Heparin) Tube     Status: None   Result Value Ref Range    Hold Specimen JIC    Extra Purple Top Tube     Status: None   Result Value Ref Range    Hold Specimen JIC    Comprehensive metabolic panel     Status:  Abnormal   Result Value Ref Range    Sodium 137 135 - 145 mmol/L    Potassium 4.0 3.4 - 5.3 mmol/L    Carbon Dioxide (CO2) 26 22 - 29 mmol/L    Anion Gap 15 7 - 15 mmol/L    Urea Nitrogen 14.5 8.0 - 23.0 mg/dL    Creatinine 0.80 0.67 - 1.17 mg/dL    GFR Estimate >90 >60 mL/min/1.73m2    Calcium 10.5 (H) 8.6 - 10.0 mg/dL    Chloride 96 (L) 98 - 107 mmol/L    Glucose 329 (H) 70 - 99 mg/dL    Alkaline Phosphatase 138 (H) 40 - 129 U/L    AST 17 0 - 45 U/L    ALT 14 0 - 70 U/L    Protein Total 8.7 (H) 6.4 - 8.3 g/dL    Albumin 4.6 3.5 - 5.2 g/dL    Bilirubin Total 1.1 <=1.2 mg/dL   Lipase     Status: Normal   Result Value Ref Range    Lipase 31 13 - 60 U/L   Lactic acid whole blood     Status: Normal   Result Value Ref Range    Lactic Acid 1.7 0.7 - 2.0 mmol/L   CBC with platelets and differential     Status: None   Result Value Ref Range    WBC Count 9.9 4.0 - 11.0 10e3/uL    RBC Count 5.26 4.40 - 5.90 10e6/uL    Hemoglobin 15.3 13.3 - 17.7 g/dL    Hematocrit 46.4 40.0 - 53.0 %    MCV 88 78 - 100 fL    MCH 29.1 26.5 - 33.0 pg    MCHC 33.0 31.5 - 36.5 g/dL    RDW 13.8 10.0 - 15.0 %    Platelet Count 421 150 - 450 10e3/uL    % Neutrophils 68 %    % Lymphocytes 25 %    % Monocytes 6 %    % Eosinophils 1 %    % Basophils 0 %    % Immature Granulocytes 0 %    NRBCs per 100 WBC 0 <1 /100    Absolute Neutrophils 6.7 1.6 - 8.3 10e3/uL    Absolute Lymphocytes 2.5 0.8 - 5.3 10e3/uL    Absolute Monocytes 0.6 0.0 - 1.3 10e3/uL    Absolute Eosinophils 0.1 0.0 - 0.7 10e3/uL    Absolute Basophils 0.0 0.0 - 0.2 10e3/uL    Absolute Immature Granulocytes 0.0 <=0.4 10e3/uL    Absolute NRBCs 0.0 10e3/uL   CBC with platelets differential     Status: None    Narrative    The following orders were created for panel order CBC with platelets differential.  Procedure                               Abnormality         Status                     ---------                               -----------         ------                     CBC with  platelets and d...[[id number]]                      Final result                 Please view results for these tests on the individual orders.     Medications   ondansetron (ZOFRAN) injection 4 mg (4 mg Intravenous $Given 11/9/23 0814)   HYDROmorphone (PF) (DILAUDID) injection 0.5 mg (0.5 mg Intravenous Not Given 11/9/23 1336)   sodium chloride 0.9% BOLUS 1,000 mL (0 mLs Intravenous Stopped 11/9/23 1315)   oxyCODONE (ROXICODONE) tablet 5 mg (5 mg Oral $Given 11/9/23 0814)   iopamidol (ISOVUE-370) solution 100 mL (100 mLs Intravenous $Given 11/9/23 1139)   sodium chloride (PF) 0.9% PF flush 75 mL (75 mLs Intravenous $Given 11/9/23 1139)     Labs Ordered and Resulted from Time of ED Arrival to Time of ED Departure   COMPREHENSIVE METABOLIC PANEL - Abnormal       Result Value    Sodium 137      Potassium 4.0      Carbon Dioxide (CO2) 26      Anion Gap 15      Urea Nitrogen 14.5      Creatinine 0.80      GFR Estimate >90      Calcium 10.5 (*)     Chloride 96 (*)     Glucose 329 (*)     Alkaline Phosphatase 138 (*)     AST 17      ALT 14      Protein Total 8.7 (*)     Albumin 4.6      Bilirubin Total 1.1     LIPASE - Normal    Lipase 31     LACTIC ACID WHOLE BLOOD - Normal    Lactic Acid 1.7     CBC WITH PLATELETS AND DIFFERENTIAL    WBC Count 9.9      RBC Count 5.26      Hemoglobin 15.3      Hematocrit 46.4      MCV 88      MCH 29.1      MCHC 33.0      RDW 13.8      Platelet Count 421      % Neutrophils 68      % Lymphocytes 25      % Monocytes 6      % Eosinophils 1      % Basophils 0      % Immature Granulocytes 0      NRBCs per 100 WBC 0      Absolute Neutrophils 6.7      Absolute Lymphocytes 2.5      Absolute Monocytes 0.6      Absolute Eosinophils 0.1      Absolute Basophils 0.0      Absolute Immature Granulocytes 0.0      Absolute NRBCs 0.0       CT Abdomen Pelvis w Contrast   Final Result   IMPRESSION:    Dilated loops of air and fluid-filled small bowel with transition   point in the left hemiabdomen concerning  for small bowel obstruction,   not significant changed from day prior.      I have personally reviewed the examination and initial interpretation   and I agree with the findings.      ALFREDA SHIPLEY MD            SYSTEM ID:  [id number]               Medical Decision Making Matrix    Problems Addressed   MDM High: 1 acute or chronic illness or injury that poses a threat to life or bodily function     Data   Considered   Data Extensive: Order of (or considering) each unique test (Cat 1), Review of the results of each unique test (Cat 1), and Independent interpretation of a test performed by another practitioner (Cat 2)     Risk of Patient Management                  Assessment & Plan    57-year-old male with complex medical history as described frequent SBO's presenting with abdominal pain following recent discharge AGAINST MEDICAL ADVICE yesterday evening with small bowel obstruction  Patient had an NG tube which per note review likely resolve his symptoms.  Unfortunately symptoms returned and he came to the ED    Here exam notable for left-sided abdominal tenderness palpation which patient reports is similar to prior SBO's  IV established, fluids ordered, labs sent  Labs notable for glucose of 329 with no anion gap.  Lactate 1.7    CT scan was performed.  Unfortunately patient had eloped from the emergency department.  In the interim CT resulted which showed similar small bowel obstruction compared to prior.  I was notified of the patient had presented then to the [facility] by ambulance and called to report the events under my care including results of CT scan to the current ED staff on [facility] we will continue caring for the patient.      I have reviewed the nursing notes. I have reviewed the findings, diagnosis, plan and need for follow up with the patient.    New Prescriptions    No medications on file       Final diagnoses:   Small bowel obstruction (H)       Chevy Bernal MD  [facility]  EMERGENCY DEPARTMENT  November 9, 2023       Chevy Bernal MD  11/09/23 0689

## 2023-11-09 NOTE — ED NOTES
Andrew called for pain medication, when writer arrived  in serge with IV hydromorphone 0.5 mg , Andrew has left serge area. Per other patients sitting in the waiting/ serge area, Andrew was seen exiting  serge area towards smoke area.Writer looked at to see if patient was out smoking, andrew was no where to be seen in smoke area.  Returned hydromorphone to [facility]. SENG Cervantes

## 2023-11-09 NOTE — MEDICATION SCRIBE - ADMISSION MEDICATION HISTORY
Medication Scribe Admission Medication History    Admission medication history is complete. The information provided in this note is only as accurate as the sources available at the time of the update.    Information Source(s): Patient and CareEverywhere/SureScripts via in-person    Pertinent Information: N/A    Changes made to PTA medication list:  Added: None  Deleted: januvia, glimepride, flomax, zofran, bactroban, ibuprofen, vibramycin, wellbutrin  Changed: None    Medication Affordability:       Allergies reviewed with patient and updates made in EHR: yes    Medication History Completed By: Suzanna Carr 11/9/2023 4:19 PM    PTA Med List   Medication Sig Last Dose    alum & mag hydroxide-simethicone (MAALOX) 200-200-20 MG/5ML SUSP suspension Take 15 mLs by mouth 3 times daily as needed for indigestion More than a month    bictegravir-emtricitabine-tenofovir (BIKTARVY) -25 MG per tablet Take 1 tablet by mouth daily 11/9/2023    famotidine (PEPCID) 40 MG tablet Take 1 tablet (40 mg) by mouth daily 11/9/2023    insulin glargine (LANTUS PEN) 100 UNIT/ML pen Inject 32 Units Subcutaneous every morning (Patient taking differently: Inject 37 Units Subcutaneous every morning) 11/9/2023 at am    polyethylene glycol (MIRALAX) 17 GM/Dose powder Take 17 g by mouth 2 times daily Past Week

## 2023-11-09 NOTE — ED NOTES
Bed: ED04  Expected date: 11/9/23  Expected time: 2:04 PM  Means of arrival:   Comments:  Melvin 433: 56 y/o, M, Bowel obstruction

## 2023-11-09 NOTE — PROGRESS NOTES
"Pt called out stating  \"I need something to drink\" RN explained that MD was paged and wanted to wait to hear back from provider before giving pt anything to drink because of having a SBO. Pt stated \"well I am leaving then. Get me out of here\" RN asked if pt would wait until she heard back from provider. Pt started to raise voice,  jumped out of bed and said \"no I'm not waiting. You told me to go, so I am going. It is your fault\" Pt  allowed RN to remove PIV. Pt then said \"get me a bus token or a taxi\". RN explained AMA form to pt and that transportation cannot be provided when a patient leaves AMA. Pt refused to sign AMA form, and stated to RN \"I am going to report you, I'm not leaving then if you won't find me a ride\" RN explained that security would be called, pt then walked out of ED.  "

## 2023-11-10 ENCOUNTER — APPOINTMENT (OUTPATIENT)
Dept: GENERAL RADIOLOGY | Facility: CLINIC | Age: 58
End: 2023-11-10
Payer: COMMERCIAL

## 2023-11-10 LAB
ANION GAP SERPL CALCULATED.3IONS-SCNC: 3 MMOL/L (ref 7–15)
BUN SERPL-MCNC: 12.3 MG/DL (ref 8–23)
CALCIUM SERPL-MCNC: 8.8 MG/DL (ref 8.6–10)
CHLORIDE SERPL-SCNC: 105 MMOL/L (ref 98–107)
CREAT SERPL-MCNC: 0.71 MG/DL (ref 0.67–1.17)
DEPRECATED HCO3 PLAS-SCNC: 28 MMOL/L (ref 22–29)
EGFRCR SERPLBLD CKD-EPI 2021: >90 ML/MIN/1.73M2
ERYTHROCYTE [DISTWIDTH] IN BLOOD BY AUTOMATED COUNT: 13.6 % (ref 10–15)
GLUCOSE BLDC GLUCOMTR-MCNC: 126 MG/DL (ref 70–99)
GLUCOSE BLDC GLUCOMTR-MCNC: 135 MG/DL (ref 70–99)
GLUCOSE BLDC GLUCOMTR-MCNC: 142 MG/DL (ref 70–99)
GLUCOSE BLDC GLUCOMTR-MCNC: 250 MG/DL (ref 70–99)
GLUCOSE BLDC GLUCOMTR-MCNC: 265 MG/DL (ref 70–99)
GLUCOSE SERPL-MCNC: 143 MG/DL (ref 70–99)
HCT VFR BLD AUTO: 34.9 % (ref 40–53)
HGB BLD-MCNC: 11.3 G/DL (ref 13.3–17.7)
MCH RBC QN AUTO: 28.3 PG (ref 26.5–33)
MCHC RBC AUTO-ENTMCNC: 32.4 G/DL (ref 31.5–36.5)
MCV RBC AUTO: 87 FL (ref 78–100)
PLATELET # BLD AUTO: 283 10E3/UL (ref 150–450)
POTASSIUM SERPL-SCNC: 3.7 MMOL/L (ref 3.4–5.3)
RBC # BLD AUTO: 4 10E6/UL (ref 4.4–5.9)
SODIUM SERPL-SCNC: 136 MMOL/L (ref 135–145)
WBC # BLD AUTO: 7.3 10E3/UL (ref 4–11)

## 2023-11-10 PROCEDURE — 74018 RADEX ABDOMEN 1 VIEW: CPT | Mod: 26 | Performed by: RADIOLOGY

## 2023-11-10 PROCEDURE — 120N000002 HC R&B MED SURG/OB UMMC

## 2023-11-10 PROCEDURE — 250N000013 HC RX MED GY IP 250 OP 250 PS 637

## 2023-11-10 PROCEDURE — 99233 SBSQ HOSP IP/OBS HIGH 50: CPT | Performed by: PEDIATRICS

## 2023-11-10 PROCEDURE — 74018 RADEX ABDOMEN 1 VIEW: CPT

## 2023-11-10 PROCEDURE — 250N000012 HC RX MED GY IP 250 OP 636 PS 637: Performed by: PEDIATRICS

## 2023-11-10 PROCEDURE — 80048 BASIC METABOLIC PNL TOTAL CA: CPT

## 2023-11-10 PROCEDURE — 85027 COMPLETE CBC AUTOMATED: CPT

## 2023-11-10 PROCEDURE — 250N000011 HC RX IP 250 OP 636: Performed by: PEDIATRICS

## 2023-11-10 PROCEDURE — 36415 COLL VENOUS BLD VENIPUNCTURE: CPT

## 2023-11-10 RX ORDER — NALOXONE HYDROCHLORIDE 0.4 MG/ML
0.2 INJECTION, SOLUTION INTRAMUSCULAR; INTRAVENOUS; SUBCUTANEOUS
Status: DISCONTINUED | OUTPATIENT
Start: 2023-11-10 | End: 2023-11-12 | Stop reason: HOSPADM

## 2023-11-10 RX ORDER — HEPARIN SODIUM 5000 [USP'U]/.5ML
5000 INJECTION, SOLUTION INTRAVENOUS; SUBCUTANEOUS EVERY 12 HOURS
Status: DISCONTINUED | OUTPATIENT
Start: 2023-11-10 | End: 2023-11-12 | Stop reason: HOSPADM

## 2023-11-10 RX ORDER — NALOXONE HYDROCHLORIDE 0.4 MG/ML
0.4 INJECTION, SOLUTION INTRAMUSCULAR; INTRAVENOUS; SUBCUTANEOUS
Status: DISCONTINUED | OUTPATIENT
Start: 2023-11-10 | End: 2023-11-12 | Stop reason: HOSPADM

## 2023-11-10 RX ORDER — HYDROMORPHONE HYDROCHLORIDE 2 MG/1
2 TABLET ORAL
Status: DISCONTINUED | OUTPATIENT
Start: 2023-11-10 | End: 2023-11-12

## 2023-11-10 RX ADMIN — BICTEGRAVIR SODIUM, EMTRICITABINE, AND TENOFOVIR ALAFENAMIDE FUMARATE 1 TABLET: 50; 200; 25 TABLET ORAL at 09:00

## 2023-11-10 RX ADMIN — INSULIN ASPART 2 UNITS: 100 INJECTION, SOLUTION INTRAVENOUS; SUBCUTANEOUS at 23:02

## 2023-11-10 RX ADMIN — POLYETHYLENE GLYCOL 3350 17 G: 17 POWDER, FOR SOLUTION ORAL at 20:49

## 2023-11-10 RX ADMIN — HEPARIN SODIUM 5000 UNITS: 5000 INJECTION, SOLUTION INTRAVENOUS; SUBCUTANEOUS at 20:49

## 2023-11-10 RX ADMIN — FAMOTIDINE 40 MG: 20 TABLET ORAL at 08:58

## 2023-11-10 ASSESSMENT — ACTIVITIES OF DAILY LIVING (ADL)
ADLS_ACUITY_SCORE: 20
DEPENDENT_IADLS:: INDEPENDENT
ADLS_ACUITY_SCORE: 20

## 2023-11-10 NOTE — CONSULTS
Care Management Initial Consult    General Information  Assessment completed with: Patient,  (Andrew)  Type of CM/SW Visit: Initial Assessment    Primary Care Provider verified and updated as needed:  (No PCP, does not want assistance in establishing)   Readmission within the last 30 days: no previous admission in last 30 days      Reason for Consult: community resources  Advance Care Planning: Advance Care Planning Reviewed: no concerns identified          Communication Assessment  Patient's communication style: spoken language (English or Bilingual)    Hearing Difficulty or Deaf: no   Wear Glasses or Blind: no    Cognitive  Cognitive/Neuro/Behavioral: WDL           Mood/Behavior: cooperative, calm          Living Environment:   People in home: facility resident     Current living Arrangements: shelter  [facility]: [facility]   Able to return to prior arrangements: yes       Family/Social Support:  Care provided by: self  Provides care for: no one  Marital Status: Single   (Did not specify)          Description of Support System: Other (see comments) (unable to assess)    Support Assessment: Other (see comments) (Unable to assess)    Current Resources:   Patient receiving home care services: No     Community Resources: None  Equipment currently used at home: none  Supplies currently used at home: None    Employment/Financial:  Employment Status: unemployed        Financial Concerns: none   Referral to Financial Worker: No       Does the patient's insurance plan have a 3 day qualifying hospital stay waiver?  No    Lifestyle & Psychosocial Needs:  Social Determinants of Health     Food Insecurity: Not on file   Depression: Not at risk (8/27/2020)    PHQ-2     PHQ-2 Score: 0   Housing Stability: Not on file   Tobacco Use: High Risk (11/9/2023)    Patient History     Smoking Tobacco Use: Some Days     Smokeless Tobacco Use: Former     Passive Exposure: Not on file   Financial Resource Strain: Not on file   Alcohol Use:  Unknown (8/27/2020)    AUDIT-C     Frequency of Alcohol Consumption: Not on file     Average Number of Drinks: Patient refused     Frequency of Binge Drinking: Not on file   Transportation Needs: Not on file   Physical Activity: Not on file   Interpersonal Safety: Not on file   Stress: Not on file   Social Connections: Not on file       Functional Status:  Prior to admission patient needed assistance:   Dependent ADLs:: Independent  Dependent IADLs:: Independent       Mental Health Status:  Mental Health Status: No Current Concerns       Chemical Dependency Status:  Chemical Dependency Status: No Current Concerns             Values/Beliefs:  Spiritual, Cultural Beliefs, Shinto Practices, Values that affect care: no          Values/Beliefs Comment:  (Roman Catholic)    Additional Information:    Met with pt at bedside, introduced self and role. Initial assessment completed with pt. Consulted for community resources.    Pt confirmed their address, they live in [facility]. Pt does not have a PCP, declined assistance in establishing. Pt does not wish to have any contacts added. Pt plans to discharge back to shelter and utilize a taxi for discharge transportation. Pt declined financial, mental health, chemical health concerns. SW offered assistance with community resource navigation and information - pt declined.     No questions or concerns. No discharge needs identified. Care management will be available as needed.    GERONIMO Tucker BSW  Float   Covering 8a - [phone]

## 2023-11-10 NOTE — PROGRESS NOTES
[facility]    Medicine Progress Note - Hospitalist Service, GOLD TEAM 22    Date of Admission:  11/9/2023    Assessment & Plan   Andrew Collier is a 57 year old male with a history of recurrent SBO, poorly controlled DM2, HIV (on Biktarvy c/b intermittent compliance), polysubstance use disorder (IV meth use), BPH, and unable housing admitted on 11/9/2023 for a small bowel obstruction. He recently left A on 11/8 and 11/9 after presenting for the same after not wanting to remain NPO. He tolerated NG clamping for 3 hours, so slowly advancing diet.     #SBO, recurrent  #Nausea w/ vomiting  #Abdominal pain, L sided  CT scan 11/9 showed dilated loops of bowel with air/fluid levels and a transition point in the L hemiabdomen. Surgery consulted in the ED during initial presentation on 11/8 and recommended conservative management, did not feel that a new consult was needed; they would like to be notified if he decompensates. He had a bowel movement on 11/10 and tolerated NG tube clamping, so will slowly advance diet.   - NG tube clamped since 0900 on 11/10  - Clear liquid diet, ADAT  - Pain control with dilaudid 0.3 mg q4h prn, transition to PO when tolerating  - Continue famotidine 40 mg daily   - Discontinued IV protonix since now tolerating PO  - Miralax 17g BID, prn senna also available   - PRN zofran and compazine ordered     #T2DM, poorly controlled  A1c 8/6/23 of 13.3. PTA regimen: Lantus 32U every morning (pt reports taking 37U), glimepiride 8mg every morning, and sitagliptin 100mg daily.  - HOLD glimeperide and sitagliptin while inpatient  - Continue lantus 15 units qAM (home dose 32 units daily, reports 37 units daily)  - Medium SSI   - Hypoglycemia protocol ordered    #HIV  On Biktarvy, apparently w/ intermittent compliance. CD4 count 525 and RNA 19,900 (as of 8/27/23).  - Continue Biktarvy -25mg once daily    #Polysubstance use disorder (including IV  "meth use)  Reports using meth once a month. Administers intravenously, reports using clean needles. Offered addiction med/ risk reduction consult, but pt declined.   - Declined addiction medicine consult at this time    #Unstable housing  Per recent ED note, pt is homeless.   - Placed SW/CC consult to provide outpatient resources if desired, appreciate recs          Diet: Combination Diet Clear Liquid    DVT Prophylaxis: Heparin SQ  Potst Catheter: Not present  Lines: None     Cardiac Monitoring: None  Code Status: Full Code      Clinically Significant Risk Factors Present on Admission           # Hypercalcemia: Highest Ca = 10.5 mg/dL in last 2 days, will monitor as appropriate            # DMII: A1C = 10.7 % (Ref range: <5.7 %) within past 6 months   # Overweight: Estimated body mass index is 28.15 kg/m  as calculated from the following:    Height as of this encounter: 1.626 m (5' 4\").    Weight as of this encounter: 74.4 kg (164 lb).       # Financial/Environmental Concerns: none         Disposition Plan      Expected Discharge Date: 11/11/2023      Destination: shelter  Discharge Comments: TBD may leave AMA            EDDIE BOLIVAR MD  Hospitalist Service, GOLD TEAM 22  [facility]  Securely message with [employer] (more info)  Text page via [facility] Paging/Directory   See signed in provider for up to date coverage information  ______________________________________________________________________    Interval History   No acute events overnight. He says that he had a large bowel movement this morning. He says that his abdomen is  but improved after the bowel movement. He was able to tolerate clamping the NG this morning and would like to try a diet.     Physical Exam   Vital Signs: Temp: 97.8  F (36.6  C) Temp src: Oral BP: 119/76 Pulse: 82   Resp: 18 SpO2: 96 % O2 Device: None (Room air)    Weight: 164 lbs 0 oz    Constitutional: awake, alert, and no " apparent distress  Eyes: extra-ocular muscles intact and sclera clear  Respiratory: no increased work of breathing  Skin: scattered healing lesions  Refused abdominal exam. Refused remainder of exam.     Medical Decision Making       45 MINUTES SPENT BY ME on the date of service doing chart review, history, exam, documentation & further activities per the note.      Data     I have personally reviewed the following data over the past 24 hrs:    7.3  \   11.3 (L)   / 283     136 105 12.3 /  126 (H)   3.7 28 0.71 \     TSH: N/A T4: N/A A1C: 10.7 (H)       Imaging results reviewed over the past 24 hrs:   No results found for this or any previous visit (from the past 24 hour(s)).

## 2023-11-10 NOTE — PROGRESS NOTES
"VS: Blood pressure 112/58, pulse 82, temperature 98.8  F (37.1  C), temperature source Oral, resp. rate 18, height 1.626 m (5' 4\"), weight 74.4 kg (164 lb), SpO2 96%.     Output/Last BM: Voiding okay per urinal. Hypoactive bowel sounds. No BM overnight   Activity: Supervision due to tube otherwise up ad lexi   Skin/Dressing: WNL   Pain: Complaints of abdominal pain and was medicated with PRN Dilaudid X 1.   CMS: A/Ox4   Diet: NPO. Overnight persistent about having a popsical. Moonlighter updated and okayed 1 popsical. No increased complaints of pain. Off note still connected to LIS   LDA: PIV to RUE infusing LR at 100 ml/hr   Equipment:     Plan:     Additional Info:  Bgs checks but no insulin coverage due to NPO status      Goal Outcome Evaluation:  Plan of Care Reviewed With: patient  Overall Patient Progress: Improving     "

## 2023-11-10 NOTE — PROGRESS NOTES
Patient arrived to unit 1845. Independent, continent of bowel and bladder. Reports ongoing LUE pain. NG tube present. PIV to RUE infusing LR @100mL/hour.

## 2023-11-10 NOTE — DISCHARGE SUMMARY
"[facility]  Hospitalist Discharge Summary      Date of Admission:  11/9/2023  Date of Discharge:  11/12/2023  Discharging Provider: EDDIE BOLIVAR MD  Discharge Service: Hospitalist Service, GOLD TEAM 22    Discharge Diagnoses   Nausea, vomiting due to small bowel obstruction  Hyperglycemia due to T2DM    Clinically Significant Risk Factors     # DMII: A1C = 10.7 % (Ref range: <5.7 %) within past 6 months    # Overweight: Estimated body mass index is 28.15 kg/m  as calculated from the following:    Height as of this encounter: 1.626 m (5' 4\").    Weight as of this encounter: 74.4 kg (164 lb).       Follow-ups Needed After Discharge   PCP can follow up on HIV meds and diabetes regimen    Discharge Disposition   Discharged to home  Condition at discharge: Stable    Hospital Course   Andrew Collier is a 57 year old male with a history of recurrent SBO, poorly controlled DM2, HIV (on Biktarvy c/b intermittent compliance), polysubstance use disorder (IV meth use), BPH, and unable housing admitted on 11/9/2023 for a small bowel obstruction. He recently left A on 11/8 and 11/9 after presenting for the same after not wanting to remain NPO. Symptoms improved with placement of NG to suction, NG came out on 11/10 in the evening and diet advanced from CLD to full liquids and then low fiber on 11/11. He had a bowel movement on 11/12 and wished to discharge. He will discharge on a low residual diet, and a bowel regimen was sent to his pharmacy. Return precautions were discussed, and he was discharged home in stable condition.     T2DM, poorly controlled  A1c 8/6/23 of 13.3. PTA regimen: Lantus 32U every morning (pt reports taking 37U), glimepiride 8mg every morning, and sitagliptin 100mg daily. Home regimen continued at discharge.     HIV  On Biktarvy, apparently w/ intermittent compliance. CD4 count 525 and RNA 19,900 (as of 8/27/23). Resumed at " discharge.    Polysubstance use disorder (including IV meth use)  Reports using meth once a month. Administers intravenously, reports using clean needles. Offered addiction med/ risk reduction consult, but pt declined.    Unstable housing  Per recent ED note, pt is homeless. SW was consulted and provided community resources.     Consultations This Hospital Stay   CARE MANAGEMENT / SOCIAL WORK IP CONSULT    Code Status   Full Code    Time Spent on this Encounter   I, Shelley Iglesias MD, personally saw the patient today and spent less than or equal to 30 minutes discharging this patient.       Shelley Iglesias MD, MPH  Internal Medicine-Pediatrics [facility]   ______________________________________________________________________    Physical Exam   Vital Signs: Temp: 98.2  F (36.8  C) Temp src: Oral BP: 138/79 Pulse: 78   Resp: 16 SpO2: 97 % O2 Device: None (Room air)    Weight: 164 lbs 0 oz    General: Awake, alert, resting comfortably in bed in no acute distress.   HEENT: AT/NC. EOMI. MMM. Neck supple.   Pulm: Comfortable work of breathing.   Neuro: Awake, alert, moving all extremities.   Patient declined remainder of exam.        Primary Care Physician   Physician No Ref-Primary    Discharge Orders      Reason for your hospital stay    You were hospitalized for a bowel obstruction.     Activity    Your activity upon discharge: activity as tolerated     Adult [facility] Follow-up and recommended labs and tests    Follow up with your outpatient diabetes provider and the provider that prescribes your Biktarvy as needed.    Appointments on [place] and/or [facility] (with [facility] or [facility] provider or service). Call [phone] if you haven't heard regarding these appointments within 7 days of discharge.     Diet    Follow this diet upon discharge: Orders Placed This Encounter      Combination Diet Low Fiber       Significant Results and Procedures   Most  Recent 3 CBC's:  Recent Labs   Lab Test 11/12/23  1156 11/10/23  0637 11/09/23  0706 11/08/23  0101   WBC  --  7.3 9.9 7.2   HGB  --  11.3* 15.3 13.9   MCV  --  87 88 85    283 421 360     Most Recent 3 BMP's:  Recent Labs   Lab Test 11/12/23  1156 11/12/23  1154 11/12/23  0141 11/11/23  2206 11/10/23  1000 11/10/23  0637 11/09/23  1738 11/09/23  1703 11/09/23  0706 11/08/23  0442 11/08/23  0101   NA  --   --   --   --   --  136  --   --  137  --  135   POTASSIUM  --   --   --   --   --  3.7  --   --  4.0  --  3.9   CHLORIDE  --   --   --   --   --  105  --   --  96*  --  96*   CO2  --   --   --   --   --  28  --   --  26  --  26   BUN  --   --   --   --   --  12.3  --   --  14.5  --  15.8   CR 0.65*  --   --   --   --  0.71  --  0.75 0.80  --  0.89   ANIONGAP  --   --   --   --   --  3*  --   --  15  --  13   LINDA  --   --   --   --   --  8.8  --   --  10.5*  --  9.9   GLC  --  268* 359* 447*   < > 143*   < >  --  329*   < > 518*    < > = values in this interval not displayed.     Most Recent 2 LFT's:  Recent Labs   Lab Test 11/09/23  0706 11/08/23  0101   AST 17 12   ALT 14 13   ALKPHOS 138* 155*   BILITOTAL 1.1 0.4   ,   Results for orders placed or performed during the hospital encounter of 11/09/23   XR Abdomen 1 View    Narrative    EXAM: XR ABDOMEN 1 VIEW  11/10/2023 5:26 PM     HISTORY:  NG tube placement       COMPARISON:  CT 11/9/2023 and x-ray 11/8/2023    FINDINGS:   AP supine portable abdomen. NG tube tip and sidehole terminating over  the proximal stomach. Prominent loop of small bowel in the left  central abdomen measuring up to 4.3 cm in diameter, dilated. This  bowel loop wall is slightly thickened. Otherwise, no dilated scattered  air-filled loops of bowel without visualized pneumatosis or any  definite portal venous gas. No acute osseous abnormality.      Impression    IMPRESSION:  1. NG tube tip and sidehole terminating over the proximal stomach.  2. Focal dilated loop of small bowel over  the left central abdomen  with mildly thickened wall. No pneumatosis or portal venous gas seen.    I have personally reviewed the examination and initial interpretation  and I agree with the findings.    JOSEFINA GALAN MD         SYSTEM ID:  [id number]     *Note: Due to a large number of results and/or encounters for the requested time period, some results have not been displayed. A complete set of results can be found in Results Review.       Discharge Medications   Current Discharge Medication List        START taking these medications    Details   glimepiride (AMARYL) 4 MG tablet Take 2 tablets (8 mg) by mouth every morning (before breakfast)    Associated Diagnoses: Type 2 diabetes mellitus with hyperglycemia, with long-term current use of insulin (H)      senna-docusate (SENOKOT-S/PERICOLACE) 8.6-50 MG tablet Take 1-2 tablets by mouth 2 times daily as needed for constipation  Qty: 60 tablet, Refills: 0    Associated Diagnoses: SBO (small bowel obstruction) (H)      sitagliptin (JANUVIA) 100 MG tablet Take 1 tablet (100 mg) by mouth daily    Associated Diagnoses: Type 2 diabetes mellitus with hyperglycemia, with long-term current use of insulin (H)           CONTINUE these medications which have CHANGED    Details   bictegravir-emtricitabine-tenofovir (BIKTARVY) -25 MG per tablet Take 1 tablet by mouth daily  Qty: 30 tablet, Refills: 0    Associated Diagnoses: Human immunodeficiency virus I infection (H)      polyethylene glycol (MIRALAX) 17 GM/Dose powder Take 17 g by mouth 2 times daily  Qty: 510 g, Refills: 0    Associated Diagnoses: SBO (small bowel obstruction) (H)           CONTINUE these medications which have NOT CHANGED    Details   alum & mag hydroxide-simethicone (MAALOX) 200-200-20 MG/5ML SUSP suspension Take 15 mLs by mouth 3 times daily as needed for indigestion  Qty: 10 mL, Refills: 0    Associated Diagnoses: SBO (small bowel obstruction) (H)      famotidine (PEPCID) 40 MG tablet Take 1 tablet  "(40 mg) by mouth daily  Qty: 30 tablet, Refills: 2    Associated Diagnoses: Gastroesophageal reflux disease, unspecified whether esophagitis present      insulin glargine (LANTUS PEN) 100 UNIT/ML pen Inject 32 Units Subcutaneous every morning  Qty: 45 mL, Refills: 1    Comments: If Lantus is not covered by insurance, may substitute Basaglar or Semglee or other insulin glargine product per insurance preference at same dose and frequency.    Associated Diagnoses: Type 2 diabetes mellitus with hyperglycemia, with long-term current use of insulin (H)      blood glucose (NO BRAND SPECIFIED) lancets standard Use to test blood sugar 1 time daily or as directed.  Qty: 100 lancet, Refills: 4    Associated Diagnoses: Type 2 diabetes mellitus with hyperglycemia, with long-term current use of insulin (H)      blood glucose (NO BRAND SPECIFIED) test strip Use to test blood sugar 1 time daily or as directed.  Qty: 100 strip, Refills: 4    Associated Diagnoses: Type 2 diabetes mellitus with hyperglycemia, with long-term current use of insulin (H)      insulin pen needle (32G X 4 MM) 32G X 4 MM miscellaneous Use 4-5 pen needles daily or as directed.  Qty: 100 each, Refills: 2    Associated Diagnoses: Type 2 diabetes mellitus with hyperglycemia, with long-term current use of insulin (H)           Allergies   Allergies   Allergen Reactions    Fentanyl Blisters     Per pt, after taking this medication had blisters develope    Tylenol [Acetaminophen] Itching    Dulaglutide Rash    Insulin Lispro Rash     Patient reported    Penicillin V Other (See Comments) and Rash     Diffuse maculopapular rash + feels \"high\", per pt.      "

## 2023-11-10 NOTE — PLAN OF CARE
Problem: Adult Inpatient Plan of Care  Goal: Plan of Care Review  Description: The Plan of Care Review/Shift note should be completed every shift.  The Outcome Evaluation is a brief statement about your assessment that the patient is improving, declining, or no change.  This information will be displayed automatically on your shift  note.  Flowsheets (Taken 11/10/2023 1050)  Outcome Evaluation: Pt plans to discharge back to [facility], anticipates taking a taxi at discharge. No discharge needs identified  Plan of Care Reviewed With: patient  Overall Patient Progress: no change

## 2023-11-11 LAB
GLUCOSE BLDC GLUCOMTR-MCNC: 175 MG/DL (ref 70–99)
GLUCOSE BLDC GLUCOMTR-MCNC: 324 MG/DL (ref 70–99)
GLUCOSE BLDC GLUCOMTR-MCNC: 334 MG/DL (ref 70–99)
GLUCOSE BLDC GLUCOMTR-MCNC: 349 MG/DL (ref 70–99)
GLUCOSE BLDC GLUCOMTR-MCNC: 447 MG/DL (ref 70–99)

## 2023-11-11 PROCEDURE — 250N000012 HC RX MED GY IP 250 OP 636 PS 637

## 2023-11-11 PROCEDURE — 250N000011 HC RX IP 250 OP 636: Performed by: PEDIATRICS

## 2023-11-11 PROCEDURE — 120N000002 HC R&B MED SURG/OB UMMC

## 2023-11-11 PROCEDURE — 99207 PR NO CHARGE LOS: CPT | Performed by: PEDIATRICS

## 2023-11-11 PROCEDURE — 250N000013 HC RX MED GY IP 250 OP 250 PS 637

## 2023-11-11 PROCEDURE — 99232 SBSQ HOSP IP/OBS MODERATE 35: CPT | Performed by: PEDIATRICS

## 2023-11-11 RX ORDER — GLIMEPIRIDE 4 MG/1
8 TABLET ORAL
Start: 2023-11-11 | End: 2024-02-07

## 2023-11-11 RX ADMIN — Medication 1 MG: at 22:24

## 2023-11-11 RX ADMIN — HEPARIN SODIUM 5000 UNITS: 5000 INJECTION, SOLUTION INTRAVENOUS; SUBCUTANEOUS at 20:34

## 2023-11-11 RX ADMIN — INSULIN ASPART 5 UNITS: 100 INJECTION, SOLUTION INTRAVENOUS; SUBCUTANEOUS at 22:23

## 2023-11-11 RX ADMIN — POLYETHYLENE GLYCOL 3350 17 G: 17 POWDER, FOR SOLUTION ORAL at 20:34

## 2023-11-11 RX ADMIN — BICTEGRAVIR SODIUM, EMTRICITABINE, AND TENOFOVIR ALAFENAMIDE FUMARATE 1 TABLET: 50; 200; 25 TABLET ORAL at 08:41

## 2023-11-11 RX ADMIN — HEPARIN SODIUM 5000 UNITS: 5000 INJECTION, SOLUTION INTRAVENOUS; SUBCUTANEOUS at 08:38

## 2023-11-11 RX ADMIN — Medication 1 MG: at 02:36

## 2023-11-11 RX ADMIN — INSULIN GLARGINE 15 UNITS: 100 INJECTION, SOLUTION SUBCUTANEOUS at 10:39

## 2023-11-11 RX ADMIN — FAMOTIDINE 40 MG: 20 TABLET ORAL at 08:41

## 2023-11-11 RX ADMIN — SENNOSIDES AND DOCUSATE SODIUM 2 TABLET: 50; 8.6 TABLET ORAL at 20:36

## 2023-11-11 ASSESSMENT — ACTIVITIES OF DAILY LIVING (ADL)
ADLS_ACUITY_SCORE: 20

## 2023-11-11 NOTE — PROGRESS NOTES
Shift 2609-5812:History ofrecurrent  SBO,DM2, HIV, Polysubstance Abuse and BPH    Summary:Pt has tolerated Clear Liquids after having his NG out. Pt states that he his not  Having any  abdominal pain  VS:       Pt A/O X 4. Afebrile. VSS. Lungs- Clear bilaterally Denies nausea, shortness of breath, and chest pain.     Output:       Bowels- + in all four quadrants.No stool Voids spontaneously without   difficulty in the urinal.      Activity:       Pt up independently .     Skin:   No skin issues .     Pain:       Denies pain.      CMS:       CMS and Neuro's are intact. Denies numbness and tingling in all extremities.      Dressing:     No incisional dressings.      Diet:       Pt is on a Clear FABIO diet Pt is tolerating Clears.       LDA:       PIV is patent in the Right antecubital .      Equipment:     Refused. PCD's on BLE's. Bilateral heels are elevated off the bed.     Plan:       Pt is able to make needs known and the call light is within the pt's reach. Continue to monitor.       Additional Info:        .

## 2023-11-11 NOTE — PROGRESS NOTES
Brief Medicine Progress Note  November 10, 2023     Notified that pt NG tube pulled out of place, pt states it was by accident, but will will not have it replaced.     Spoke w/ nurse, let them know that he has a history of removing his NG tube on his own, and that we likely would not find a solution to this overnight. Stated to keep him w/ the clear liquid diet for now to ensure he can tolerate.    Plan:  - Do not replace NG this evening  - Continue clear liquid diet  - Transition from IV to PO dilaudid for pain management as pt has been taking other meds w/ water w/o issue    - Contact medicine if pt develops severe nausea, vomiting.     Yuri Dukes PA-C  [facility] Hospitalist Service  Page via [employer] or [employer]

## 2023-11-11 NOTE — PROGRESS NOTES
"VS: BP 97/51 (BP Location: Left arm)   Pulse 90   Temp 99.1  F (37.3  C) (Oral)   Resp 18   Ht 1.626 m (5' 4\")   Wt 74.4 kg (164 lb)   SpO2 99%   BMI 28.15 kg/m      O2: SpO2 99%   Neuro: Alert and Oriented X 4   Bowel/Bladder: Normoactive/ Continent   LBM: 10/10/2023   Diet: Liquid diet   Skin: Normal   Pain: No pain reported throughout shift    Activity: NG tube/ Pt removed NG Tube/ indicated it was by accident/ Provider paged/   Dressings: No dressing   LDA: PIV Right antecubital Fossa   Equipment: Belonging with pt   Plan:  He had a bowel movement on 11/10 and tolerated NG tube clamping, so will slowly advance diet.    Additional Info: History of recurrent SBO, poorly controlled DM2, HIV (on Biktarvy c/b intermittent compliance), polysubstance use disorder (IV meth use), BPH          "

## 2023-11-11 NOTE — PROGRESS NOTES
"VS: /58 (BP Location: Left arm, Cuff Size: Adult Regular)   Pulse 90   Temp (!) 37.2  F (2.9  C) (Oral)   Resp 16   Ht 1.626 m (5' 4\")   Wt 74.4 kg (164 lb)   SpO2 99%   BMI 28.15 kg/m      O2: SpO2 99%   Neuro: Alert and Oriented X 4   Bowel/Bladder: Normoactive/ Continent   LBM: 10/10/2023   Diet: Liquid diet   Skin: Normal   Pain: No pain reported throughout shift    Activity: Steady gait   Dressings: No dressing   LDA: PIV Right antecubital Fossa   Equipment: Belonging with pt   Plan: Advanced to low fiber today.      Additional Info: History of recurrent SBO, poorly controlled DM2, HIV (on Biktarvy c/b intermittent compliance), polysubstance use disorder (IV meth use), BPH          "

## 2023-11-11 NOTE — PROGRESS NOTES
[facility]    Medicine Progress Note - Hospitalist Service, GOLD TEAM 22    Date of Admission:  11/9/2023    Assessment & Plan   Andrew Collier is a 57 year old male with a history of recurrent SBO, poorly controlled DM2, HIV (on Biktarvy c/b intermittent compliance), polysubstance use disorder (IV meth use), BPH, and unable housing admitted on 11/9/2023 for a small bowel obstruction. He recently left AMA on 11/8 and 11/9 after presenting for the same after not wanting to remain NPO. He tolerated NG clamping for 3 hours, so slowly advancing diet.     #SBO, recurrent  #Nausea w/ vomiting  #Abdominal pain, L sided  CT scan 11/9 showed dilated loops of bowel with air/fluid levels and a transition point in the L hemiabdomen. Surgery consulted in the ED during initial presentation on 11/8 and recommended conservative management, did not feel that a new consult was needed; they would like to be notified if he decompensates. He had a bowel movement on 11/10 and tolerated NG tube clamping, so will slowly advance diet.   - NG tube clamped since 0900 on 11/10, removed accidentally everning 11/10  - advance to full liquid diet this morning 11/11 --> tolerated full liquids and reported eating crackers last night, will advance to low fiber this afternoon  - Pain control with dilaudid PO  - Continue famotidine 40 mg daily  - Miralax 17g BID, prn senna also available; 1 bm yesterday  - PRN zofran and compazine ordered     #T2DM, poorly controlled  A1c 8/6/23 of 13.3. PTA regimen: Lantus 32U every morning (pt reports taking 37U), glimepiride 8mg every morning, and sitagliptin 100mg daily.  - HOLD glimeperide and sitagliptin while inpatient  - Continue lantus 15 units qAM (home dose 32 units daily, reports 37 units daily)  - Medium SSI   - Hypoglycemia protocol ordered    #HIV  On Biktarvy, apparently w/ intermittent compliance. CD4 count 525 and RNA 19,900 (as of 8/27/23).  -  "Continue Biktarvy -25mg once daily    #Polysubstance use disorder (including IV meth use)  Reports using meth once a month. Administers intravenously, reports using clean needles. Offered addiction med/ risk reduction consult, but pt declined.   - Declined addiction medicine consult at this time    #Unstable housing  Per recent ED note, pt is homeless.   - Placed SW/CC consult to provide outpatient resources if desired, appreciate recs          Diet: Combination Diet Clear Liquid    DVT Prophylaxis: Heparin SQ  Potts Catheter: Not present  Lines: None     Cardiac Monitoring: None  Code Status: Full Code      Clinically Significant Risk Factors                        # DMII: A1C = 10.7 % (Ref range: <5.7 %) within past 6 months   # Overweight: Estimated body mass index is 28.15 kg/m  as calculated from the following:    Height as of this encounter: 1.626 m (5' 4\").    Weight as of this encounter: 74.4 kg (164 lb)., PRESENT ON ADMISSION     # Financial/Environmental Concerns: none         Disposition Plan     Expected Discharge Date: 11/11/2023      Destination: shelter  Discharge Comments: TBD may leave CAMILLA Byers, DO  Hospitalist Service, GOLD TEAM 22  [facility]  Securely message with [employer] (more info)  Text page via [facility] Paging/Directory   See signed in provider for up to date coverage information  ______________________________________________________________________    Interval History   NG came out yesterday evening, not replaced. Continued to tolerate CLD. Patient is without abdominal pain this morning, no nausea, no vomiting, last bm was yesterday. He would like to advance his diet this morning to full liquids.    Physical Exam   Vital Signs: Temp: (!) 37.2  F (2.9  C) Temp src: Oral BP: 103/58 Pulse: 90   Resp: 16 SpO2: 99 % O2 Device: None (Room air)    Weight: 164 lbs 0 oz    Lying down in no distress  Awake, alert  RR and WOB " normal  Abdomen is soft, non distended, non tender without masses    Medical Decision Making       45 MINUTES SPENT BY ME on the date of service doing chart review, history, exam, documentation & further activities per the note.      Data     I have personally reviewed the following data over the past 24 hrs:    N/A  \   N/A   / N/A     N/A N/A N/A /  349 (H)   N/A N/A N/A \       Imaging results reviewed over the past 24 hrs:   Recent Results (from the past 24 hour(s))   XR Abdomen 1 View    Narrative    EXAM: XR ABDOMEN 1 VIEW  11/10/2023 5:26 PM     HISTORY:  NG tube placement       COMPARISON:  CT 11/9/2023 and x-ray 11/8/2023    FINDINGS:   AP supine portable abdomen. NG tube tip and sidehole terminating over  the proximal stomach. Prominent loop of small bowel in the left  central abdomen measuring up to 4.3 cm in diameter, dilated. This  bowel loop wall is slightly thickened. Otherwise, no dilated scattered  air-filled loops of bowel without visualized pneumatosis or any  definite portal venous gas. No acute osseous abnormality.      Impression    IMPRESSION:  1. NG tube tip and sidehole terminating over the proximal stomach.  2. Focal dilated loop of small bowel over the left central abdomen  with mildly thickened wall. No pneumatosis or portal venous gas seen.    I have personally reviewed the examination and initial interpretation  and I agree with the findings.    JOSEFINA GALAN MD         SYSTEM ID:  [id number]

## 2023-11-12 VITALS
DIASTOLIC BLOOD PRESSURE: 79 MMHG | HEART RATE: 78 BPM | RESPIRATION RATE: 16 BRPM | BODY MASS INDEX: 28 KG/M2 | WEIGHT: 164 LBS | OXYGEN SATURATION: 97 % | HEIGHT: 64 IN | SYSTOLIC BLOOD PRESSURE: 138 MMHG | TEMPERATURE: 98.2 F

## 2023-11-12 LAB
CREAT SERPL-MCNC: 0.65 MG/DL (ref 0.67–1.17)
EGFRCR SERPLBLD CKD-EPI 2021: >90 ML/MIN/1.73M2
GLUCOSE BLDC GLUCOMTR-MCNC: 268 MG/DL (ref 70–99)
GLUCOSE BLDC GLUCOMTR-MCNC: 359 MG/DL (ref 70–99)
PLATELET # BLD AUTO: 284 10E3/UL (ref 150–450)

## 2023-11-12 PROCEDURE — 82565 ASSAY OF CREATININE: CPT

## 2023-11-12 PROCEDURE — 36415 COLL VENOUS BLD VENIPUNCTURE: CPT

## 2023-11-12 PROCEDURE — 85049 AUTOMATED PLATELET COUNT: CPT

## 2023-11-12 PROCEDURE — 250N000013 HC RX MED GY IP 250 OP 250 PS 637

## 2023-11-12 PROCEDURE — 99238 HOSP IP/OBS DSCHRG MGMT 30/<: CPT | Performed by: PEDIATRICS

## 2023-11-12 PROCEDURE — 250N000011 HC RX IP 250 OP 636: Performed by: PEDIATRICS

## 2023-11-12 RX ORDER — BICTEGRAVIR SODIUM, EMTRICITABINE, AND TENOFOVIR ALAFENAMIDE FUMARATE 50; 200; 25 MG/1; MG/1; MG/1
1 TABLET ORAL DAILY
Qty: 30 TABLET | Refills: 0 | Status: SHIPPED | OUTPATIENT
Start: 2023-11-12 | End: 2024-02-07

## 2023-11-12 RX ORDER — AMOXICILLIN 250 MG
1-2 CAPSULE ORAL 2 TIMES DAILY PRN
Qty: 60 TABLET | Refills: 0 | Status: ON HOLD | OUTPATIENT
Start: 2023-11-12 | End: 2024-09-30

## 2023-11-12 RX ORDER — POLYETHYLENE GLYCOL 3350 17 G/17G
17 POWDER, FOR SOLUTION ORAL 2 TIMES DAILY
Qty: 510 G | Refills: 0 | Status: SHIPPED | OUTPATIENT
Start: 2023-11-12 | End: 2024-07-26

## 2023-11-12 RX ADMIN — FAMOTIDINE 40 MG: 20 TABLET ORAL at 11:59

## 2023-11-12 RX ADMIN — POLYETHYLENE GLYCOL 3350 17 G: 17 POWDER, FOR SOLUTION ORAL at 11:59

## 2023-11-12 RX ADMIN — BICTEGRAVIR SODIUM, EMTRICITABINE, AND TENOFOVIR ALAFENAMIDE FUMARATE 1 TABLET: 50; 200; 25 TABLET ORAL at 11:59

## 2023-11-12 RX ADMIN — HEPARIN SODIUM 5000 UNITS: 5000 INJECTION, SOLUTION INTRAVENOUS; SUBCUTANEOUS at 11:59

## 2023-11-12 ASSESSMENT — ACTIVITIES OF DAILY LIVING (ADL)
ADLS_ACUITY_SCORE: 20

## 2023-11-12 NOTE — PROGRESS NOTES
I was contacted about asymptomatic hyperglycemia, patient awake and hungry. Since >4 hours since last correctional dose okay to give one-time dose equivalent to bedtime correctional scale. Day rounder will adjust insulin dosing as indicated, consider adding carb coverage.     Dano Frost MD  11/12/23  2:35 AM

## 2023-11-12 NOTE — PROGRESS NOTES
8MS DISCHARGE    D: Patient discharged home at 1700     I: Discharge prescriptions given to patient. All discharge medications and instructions reviewed with patient. Patient instructed to seek care if experiencing worsening symptoms, such as elevated BG and bowel obstruction. Other phone numbers to call with questions or concerns after discharge reviewed. Rt PIV removed. Education completed.    A: Pt verbalized understanding of discharge medications and instructions. Prescribed home medications given to patient.  Belongings returned to patient.    P: Patient to follow-up with provider in 7 days.

## 2023-11-12 NOTE — PROGRESS NOTES
[facility]    Medicine Progress Note - Hospitalist Service, GOLD TEAM 22    Date of Admission:  11/9/2023    Assessment & Plan   Andrew Collier is a 57 year old male with a history of recurrent SBO, poorly controlled DM2, HIV (on Biktarvy c/b intermittent compliance), polysubstance use disorder (IV meth use), BPH, and unable housing admitted on 11/9/2023 for a small bowel obstruction. He recently left A on 11/8 and 11/9 after presenting for the same after not wanting to remain NPO. He has been improving over the past couple days, and NG was able to be removed yesterday. He is currently tolerating a low residual diet but has not had a bowel movement in the past 24 hours. He would like to discharge today, but he is agreeable to staying until he has a bowel movement.     Changes today:  - Not using prn dilaudid, so discontinued.   - Tolerating low residual diet. He would like to discharge today if he has a bowel movement. Medications sent to discharge pharmacy in case of discharge this afternoon.   - Declined exam today.   - Required one-time insulin correction overnight for hyperglycemia after snacking overnight.     #SBO, recurrent  #Nausea w/ vomiting  #Abdominal pain, L sided  CT scan 11/9 showed dilated loops of bowel with air/fluid levels and a transition point in the L hemiabdomen. Surgery consulted in the ED during initial presentation on 11/8 and recommended conservative management, did not feel that a new consult was needed; they would like to be notified if he decompensates. He had a bowel movement on 11/10, and he has been tolerating a low residual diet. He will try to have a BM today and would like to discharge if this occurs.   - NG tube clamped since 0900 on 11/10, removed accidentally everning 11/10  - tolerating low-residual diet  - Not using dilaudid, discontinued  - Continue famotidine 40 mg daily  - Miralax 17g BID, prn senna also available;  "last BM 11/10  - PRN zofran and compazine ordered     #T2DM, poorly controlled  A1c 8/6/23 of 13.3. PTA regimen: Lantus 32U every morning (pt reports taking 37U), glimepiride 8mg every morning, and sitagliptin 100mg daily.  - HOLD glimeperide and sitagliptin while inpatient  - Continue lantus 15 units qAM (home dose 32 units daily, reports 37 units daily)  - Medium SSI   - Hypoglycemia protocol ordered    #HIV  On Biktarvy, apparently w/ intermittent compliance. CD4 count 525 and RNA 19,900 (as of 8/27/23).  - Continue Biktarvy -25mg once daily    #Polysubstance use disorder (including IV meth use)  Reports using meth once a month. Administers intravenously, reports using clean needles. Offered addiction med/ risk reduction consult, but pt declined.   - Declined addiction medicine consult at this time    #Unstable housing  Per recent ED note, pt is homeless.   - Placed SW/CC consult to provide outpatient resources if desired, appreciate recs          Diet: Combination Diet Low Fiber  Diet    DVT Prophylaxis: Heparin SQ  Potts Catheter: Not present  Lines: None     Cardiac Monitoring: None  Code Status: Full Code      Clinically Significant Risk Factors                        # DMII: A1C = 10.7 % (Ref range: <5.7 %) within past 6 months   # Overweight: Estimated body mass index is 28.15 kg/m  as calculated from the following:    Height as of this encounter: 1.626 m (5' 4\").    Weight as of this encounter: 74.4 kg (164 lb)., PRESENT ON ADMISSION     # Financial/Environmental Concerns: none         Disposition Plan      Expected Discharge Date: 11/12/2023      Destination: shelter  Discharge Comments: for d/c today            EDDIE BOLIVAR MD  Hospitalist Service, GOLD TEAM 22  M [facility]  Securely message with [employer] (more info)  Text page via [employer] Paging/Directory   See signed in provider for up to date coverage " information  ______________________________________________________________________    Interval History   No acute events overnight. When I went to speak with him initially this morning, he said he was too tired to talk. Later in the day, he said that he tolerated a low-residual diet for lunch with no abdominal pain. He has not had a bowel movement since 11/10. He would like to discharge this evening if he has a bowel movement. He is passing gas and has not had nausea or vomiting.     Physical Exam   Vital Signs: Temp: 98.2  F (36.8  C) Temp src: Oral BP: 138/79 Pulse: 78   Resp: 16 SpO2: 97 % O2 Device: None (Room air)    Weight: 164 lbs 0 oz    General: Awake, alert, resting comfortably in bed in no acute distress.   HEENT: AT/NC, EOMI, MMM, neck supple.   Pulm: Comfortable work of breathing.   Declines remainder of exam    Medical Decision Making       50 MINUTES SPENT BY ME on the date of service doing chart review, history, exam, documentation & further activities per the note.      Data     I have personally reviewed the following data over the past 24 hrs:    N/A  \   N/A   / 284     N/A N/A N/A /  268 (H)   N/A N/A 0.65 (L) \       Imaging results reviewed over the past 24 hrs:   No results found for this or any previous visit (from the past 24 hour(s)).

## 2023-11-12 NOTE — PROGRESS NOTES
Care Management Discharge Note    Discharge Date: 11/12/2023       Discharge Disposition: Shelter, [facility]    Discharge Services: None    Discharge DME: None    Discharge Transportation:  (Taxi)    Private pay costs discussed: Not applicable    Does the patient's insurance plan have a 3 day qualifying hospital stay waiver?  No    PAS Confirmation Code:  N/A  Patient/family educated on Medicare website which has current facility and service quality ratings:  N/A    Education Provided on the Discharge Plan:    Persons Notified of Discharge Plans: bedside nurse   Patient/Family in Agreement with the Plan: yes    Handoff Referral Completed: No, no PCP. Patient not interested in PCP.    Additional Information:    SW informed that patient will be discharging today. Patient has no discharge barriers or needs, aside from transportation.    SW called [facility] Transportation After Hours line (P: [phone]). They were unable to schedule ride as patient was not in the transportation system. They transferred SW to another line but it was the incorrect system.  Transportation scheduled through [employer] (P: [phone]) for 5PM. Reference Number [id number]. Patient transpo scheduled to bring patient to [facility] by 4:45 PM. Bedside RN notified of ride.        FLORENCE Peters  11/12/2023       Social Work and Care Management Department       SEARCHABLE in [facility] - search SOCIAL WORK       [place] (0800 - 1630) Saturday and Sunday     Units: 4A, 4C, & 4E Pager: [phone]     Units: 5A & 5C Pager: [phone]     Units: 6A & 6B   Pager: [phone]     Units: 6C Pager: [phone]     Units: 7A & 7B  Pager: [phone]     Units: 7C Pager: [phone]     Unit: [place] ED Pager: [phone]      [facility] [place] (4784-0946) Saturday and Sunday      Units: 5 Ortho, 5 Med/Surg & WB ED  Pager:[phone]     Units: 6 Med/Surg, 8A, & 10A ICU  Pager: [phone]        After hours (1630 - 0000)  Saturday & Sunday; (6733-4241) Mon-Fri; (4219-2536) [facility] Recognized Holidays     Units: ALL  Pager: [phone]

## 2023-11-12 NOTE — PLAN OF CARE
"Goal Outcome Evaluation:    VS: /79 (BP Location: Left arm, Patient Position: Supine, Cuff Size: Adult Regular)   Pulse 78   Temp 98.2  F (36.8  C) (Oral)   Resp 16   Ht 1.626 m (5' 4\")   Wt 74.4 kg (164 lb)   SpO2 97%   BMI 28.15 kg/m       O2: Stable on room air >90%   Output: Voids without difficulties   Last BM: 11/12/23   Activity: Independent   Up for meals? Yes   Skin: Bruises on abdomen   Pain: Denies pain   CMS: AO x4, denies SOB, chest pain   Dressing: None   Diet: Regular   LDA: Rt PIV SL   Equipment: Personal belongings   Plan: Continue with POC   Additional Info:       "

## 2023-11-12 NOTE — PROGRESS NOTES
Shift 8338-7531:History of recurrent  SBO,DM2, HIV, Polysubstance Abuse and BPH    Summary:Pt has tolerated Clear Liquids Pt continues to state that he has abdominal pain Pt given Senna  VS:       Pt A/O X 4. Afebrile. VSS. Lungs- Clear bilaterally Denies nausea, shortness of breath, and chest pain.     Output:       Bowels- + in all four quadrants.No stool Voids spontaneously without difficulty in the urinal.      Activity:       Pt up independently .     Skin:   No skin issues .     Pain:       Pt states that he has abdominal pain. Pt has not had a stool No vomiting      CMS:       CMS and Neuro's are intact. Denies numbness and tingling in all extremities.      Dressing:     No incisional dressings.      Diet:       Pt is on a Low Fiber Diet Pt is tolerating .       LDA:       PIV is patent in the Right antecubital .      Equipment:     Refused. PCD's on BLE's. Bilateral heels are elevated off the bed.     Plan:       Pt is able to make needs known and the call light is within the pt's reach. Continue to monitor.       Additional Info:        .BS corrected at 0200 for  Previous BS@ 2200 was 457

## 2023-11-13 NOTE — DISCHARGE SUMMARY
"[facility]  Hospitalist Discharge Summary      Date of Admission:  11/8/2023  Date of AMA Discharge:  11/8/2023  6:58 PM, Left AMA  Discharging Provider: Jesus Hui PA-C  Discharge Service: Hospitalist Service, GOLD TEAM 4    Discharge Diagnoses    Chronic SBO secondary to prior adhesions   T2DM    Amphetamine use disorder    Opioid use disorder    HIV    Anxiety    Depression     Clinically Significant Risk Factors     # DMII: A1C = 10.7 % (Ref range: <5.7 %) within past 6 months  # Overweight: Estimated body mass index is 28.25 kg/m  as calculated from the following:    Height as of this encounter: 1.626 m (5' 4\").    Weight as of this encounter: 74.7 kg (164 lb 9.6 oz).           Unresulted Labs Ordered in the Past 30 Days of this Admission       No orders found from 10/9/2023 to 11/9/2023.            Discharge Disposition   Pt left against medical advice.   Condition at discharge: Poor      Hospital Course     Andrew Collier is a 57 year old male admitted on 11/8/2023. He has a history of HIV, toxoplasma, amphetamine use disorder, opioid use disorder, T2DM, and chronic SBO who presents with abdominal pain. On 11/8, patient began to demand something to eat and drink; he was reminded that he is NPO given SBO. Andrew pulled out his NG tube and requested a ride; he was informed that rides cannot be provided when patients leave AMA. He left AMA on 11/8/23.     Vomiting  Abdominal pain   Chronic SBO 2/2 prior adhesions   CT with moderately long segment of moderately dilated fluid-filled small bowel is present in the left hemiabdomen and there is an abrupt transition to a decompressed more distal small bowel with moderately distended stomach. Vitals were reassuring, and pt is hemodynamically stable. Lactate 2.7 and WBC wnl. Gen surgery was consulted and recommended NGT placement which provided improvement of symptoms. On 11/8, patient began to demand something to " eat and drink; he was reminded that he is NPO given SBO. Andrew pulled out his NG tube and demanded a ride. He left AMA on 11/8/23.      Hyperglycemia  T2DM  A1c 08/06/23 13.3. Glucose on admission 518. Received 25u Lantus in the ED and IV fluids with appropriate correction to 245. Low concern for DKA as there is no anion gap or ketones. Endocrinology consulted in the past for insulin regimens given patient's housing instability. While in the hospital, pt received lantus and sliding scale insulin. Discussed plan to follow up with PCP for BG management - pt declined to discuss; pt left AMA as described above.    Amphetamine use disorder  Opioid use disorder   - Pt did not want to discuss     HIV  -Continue Biktarvy      Anxiety  Depression   - Continue PTA wellbutrin    Consultations This Hospital Stay   SURGERY GENERAL ADULT IP CONSULT    Code Status   Prior    Time Spent on this Encounter   IJesus PA-C, personally saw the patient today and spent less than or equal to 30 minutes discharging this patient.       Jesus Hui PA-C  [facility] EMERGENCY DEPARTMENT  [address] [address]  Phone: [phone]  ______________________________________________________________________    Physical Exam   Vital Signs:                    Weight: 164 lbs 9.6 oz       No physical exam was conducted because pt left AMA.        Primary Care Physician   Physician No Ref-Primary    Discharge Orders   No discharge procedures on file.    Significant Results and Procedures       Discharge Medications   Discharge Medication List as of 11/8/2023  6:58 PM        CONTINUE these medications which have NOT CHANGED    Details   alum & mag hydroxide-simethicone (MAALOX) 200-200-20 MG/5ML SUSP suspension Take 15 mLs by mouth 3 times daily as needed for indigestion, Disp-10 mL, R-0, E-Prescribe      blood glucose (NO BRAND SPECIFIED) lancets standard Use to test blood sugar 1 time daily or as directed.Disp-100 lancet,  [id number]      blood glucose (NO BRAND SPECIFIED) test strip Use to test blood sugar 1 time daily or as directed., Disp-100 strip, R-4, E-Prescribe      famotidine (PEPCID) 40 MG tablet Take 1 tablet (40 mg) by mouth daily, Disp-30 tablet, R-2, E-Prescribe      insulin glargine (LANTUS PEN) 100 UNIT/ML pen Inject 32 Units Subcutaneous every morning, Disp-45 mL, R-1, E-PrescribeIf Lantus is not covered by insurance, may substitute Basaglar or Semglee or other insulin glargine product per insurance preference at same dose and frequency.        insulin pen needle (32G X 4 MM) 32G X 4 MM miscellaneous Use 4-5 pen needles daily or as directed.Disp-100 each, [id number]      bictegravir-emtricitabine-tenofovir (BIKTARVY) -25 MG per tablet Take 1 tablet by mouth daily, Disp-30 tablet, R-2, E-Prescribe      buPROPion (WELLBUTRIN XL) 150 MG 24 hr tablet Take 1 tablet (150 mg) by mouth daily, Disp-30 tablet, R-0, E-Prescribe      doxycycline hyclate (VIBRAMYCIN) 100 MG capsule Take 1 capsule (100 mg) by mouth every 12 hours, Disp-14 capsule, R-0, E-Prescribe      glimepiride (AMARYL) 4 MG tablet Take 2 tablets (8 mg) by mouth every morning (before breakfast), Disp-180 tablet, R-2, E-Prescribe      ibuprofen (ADVIL/MOTRIN) 600 MG tablet Take 1 tablet (600 mg) by mouth every 6 hours as needed for moderate pain (4-6), Disp-90 tablet, R-0, E-Prescribe      mupirocin (BACTROBAN) 2 % external ointment Apply topically 3 times dailyDisp-10 g, [id number]      ondansetron (ZOFRAN) 4 MG tablet Take 1 tablet (4 mg) by mouth every 6 hours as needed for nausea or vomiting, Disp-18 tablet, R-0, Local Print      polyethylene glycol (MIRALAX) 17 GM/Dose powder Take 17 g by mouth 2 times daily, Disp-510 g, R-0, E-Prescribe      sitagliptin (JANUVIA) 100 MG tablet Take 1 tablet (100 mg) by mouth daily, Disp-30 tablet, R-2, E-Prescribe      tamsulosin (FLOMAX) 0.4 MG capsule Take 1 capsule (0.4 mg) by mouth daily, Disp-30  "capsule, R-0, E-Prescribe           Allergies   Allergies   Allergen Reactions    Fentanyl Blisters     Per pt, after taking this medication had blisters develope    Tylenol [Acetaminophen] Itching    Dulaglutide Rash    Insulin Lispro Rash     Patient reported    Penicillin V Other (See Comments) and Rash     Diffuse maculopapular rash + feels \"high\", per pt.      "

## 2023-11-30 NOTE — ED PROVIDER NOTES
[facility] ([facility])  July 26, 2019    History     Chief Complaint   Patient presents with     Abdominal Pain     HPI  Andrew Lockwood is a 51 year old male who presents to the ED for evaluation of abdominal distention and a headache that started at 4:00 PM today. Patient reports he hasn't taken any of his medication. He states his insulin pen broke, so he hasn't had insulin today. He denies alcohol use. He states he is not eating well, because he is homeless.     PAST MEDICAL HISTORY  Past Medical History:   Diagnosis Date     AIDS (H)      Allergic rhinitis due to other allergen     DNS     Chronic abdominal pain      CNS toxoplasmosis (H)      Diabetes type 2, controlled (H)      GERD (gastroesophageal reflux disease)      HIV (human immunodeficiency virus infection) (H)      Periungual wart      Sleep apnea     doesn't use CPAP     PAST SURGICAL HISTORY  Past Surgical History:   Procedure Laterality Date     C NONSPECIFIC PROCEDURE      right forearm fracture     COLONOSCOPY Left 1/22/2016    Procedure: COMBINED COLONOSCOPY, SINGLE OR MULTIPLE BIOPSY/POLYPECTOMY BY BIOPSY;  Surgeon: Clark Saini MD;  Location:  GI     HC EXPLORE UNDESC TESTIS,INGUIN/SCROTAL       LAPAROSCOPIC APPENDECTOMY N/A 1/31/2018    Procedure: LAPAROSCOPIC APPENDECTOMY;  LAPAROSCOPIC APPENDECTOMY;  Surgeon: Dawn Holt MD;  Location: [facility] OR     LAPAROSCOPY DIAGNOSTIC (GENERAL) N/A 7/26/2016    Procedure: LAPAROSCOPY DIAGNOSTIC (GENERAL);  Surgeon: Susannah Arriaga MD;  Location: [facility] OR     LAPAROSCOPY DIAGNOSTIC (GENERAL) N/A 4/16/2018    Procedure: LAPAROSCOPY DIAGNOSTIC (GENERAL);  Diagnostic laparoscopy and lysis of adhesions;  Surgeon: Prince Dowling MD;  Location:  OR     OPTICAL TRACKING SYSTEM CRANIOTOMY, EXCISE TUMOR, COMBINED Left 4/10/2015    Procedure: COMBINED OPTICAL TRACKING SYSTEM CRANIOTOMY, EXCISE TUMOR;  Surgeon: Mirlande Colmenares MD;  Location:   OR     REPAIR GAMEKEEPER'S THUMB Right 2016    Procedure: REPAIR LIGAMENT ULNAR COLLATERAL THUMB (GAMEKEEPER'S);  Surgeon: Evin Zamorano MD;  Location:  OR     FAMILY HISTORY  Family History   Problem Relation Age of Onset     Diabetes Brother      Diabetes Father      Alzheimer Disease Father      Unknown/Adopted Mother      Diabetes Paternal Grandfather      Cancer No family hx of         no skin cancer     Skin Cancer No family hx of         no famiy hx of skin cancer     Glaucoma No family hx of      Macular Degeneration No family hx of      SOCIAL HISTORY  Social History     Tobacco Use     Smoking status: Current Some Day Smoker     Packs/day: 0.25     Types: Cigarettes     Last attempt to quit: 10/28/2016     Years since quittin.7     Smokeless tobacco: Former User   Substance Use Topics     Alcohol use: No     Alcohol/week: 0.0 oz     Comment: Last etoh in      MEDICATIONS  Current Facility-Administered Medications   Medication     lidocaine (PF) (XYLOCAINE) 1 % injection 4 mL     triamcinolone (KENALOG-40) injection 40 mg     Current Outpatient Medications   Medication     bictegravir-emtricitabine-tenofovir (BIKTARVY) -25 MG per tablet     blood glucose (NO BRAND SPECIFIED) lancets standard     blood glucose (NO BRAND SPECIFIED) test strip     blood glucose monitoring (NO BRAND SPECIFIED) meter device kit     cephALEXin (KEFLEX) 500 MG capsule     HUMALOG KWIKPEN 100 UNIT/ML soln     insulin glargine (BASAGLAR KWIKPEN) 100 UNIT/ML pen     insulin pen needle (B-D U/F) 31G X 8 MM miscellaneous     insulin pen needle (BD PEN NEEDLE SCOTT 2ND GEN) 32G X 4 MM miscellaneous     NOVOLOG FLEXPEN 100 UNIT/ML soln     omeprazole (PRILOSEC) 20 MG CR capsule     ranitidine (ZANTAC) 150 MG tablet     ALLERGIES  Allergies   Allergen Reactions     Metformin      Abdominal pain     Milk [Lac Bovis] Hives     Tylenol [Acetaminophen] Itching     Dulaglutide Rash       I have reviewed the  Medications, Allergies, Past Medical and Surgical History, and Social History in the Epic system.    Review of Systems   Gastrointestinal: Positive for abdominal distention.   Neurological: Positive for headaches.   All other systems reviewed and are negative.      Physical Exam   BP: 130/88  Pulse: 93  Heart Rate: 69  Temp: 97.3  F (36.3  C)  Resp: 14  Weight: 74.8 kg (165 lb)  SpO2: 100 %      Physical Exam   Constitutional: He is oriented to person, place, and time. He appears well-developed and well-nourished.  Non-toxic appearance. He does not have a sickly appearance. He does not appear ill.   Comfortably resting, lying in bed, NAD, nondiaphoretic, lucid, fully conversant, no  respiratory distress, alert and oriented.     Eyes: Pupils are equal, round, and reactive to light. EOM are normal.   Cardiovascular: Normal rate.   Pulmonary/Chest: Effort normal and breath sounds normal.   Abdominal: There is tenderness.   The abdomen is soft, nontender, nondistended, with no surgical scars     Neurological: He is alert and oriented to person, place, and time.   Skin: Skin is warm and dry. Capillary refill takes less than 2 seconds.       ED Course        Procedures             EKG Interpretation:      Interpreted by Torres Saenz  Time reviewed: 1938  Symptoms at time of EKG: epigastric pain   Rhythm: normal sinus   Rate: normal  Axis: normal  Ectopy: none  Conduction: normal  ST Segments/ T Waves: No ST-T wave changes  Q Waves: none  Comparison to prior: Unchanged    Clinical Impression: normal EKG            Critical Care time:  none             Labs Ordered and Resulted from Time of ED Arrival Up to the Time of Departure from the ED   GLUCOSE BY METER - Abnormal; Notable for the following components:       Result Value    Glucose 186 (*)     All other components within normal limits   COMPREHENSIVE METABOLIC PANEL - Abnormal; Notable for the following components:    Glucose 381 (*)     All other components  within normal limits   CBC WITH PLATELETS DIFFERENTIAL   TROPONIN I     Medications   0.9% sodium chloride BOLUS (0 mLs Intravenous Stopped 7/26/19 2216)   lidocaine HCl (XYLOCAINE) 2 % 15 mL, alum & mag hydroxide-simethicone (MYLANTA ES/MAALOX  ES) 15 mL GI Cocktail (30 mLs Oral Given 7/26/19 2054)   insulin aspart (NovoLOG) inj (RAPID ACTING) (8 Units Subcutaneous Given 7/26/19 2213)     Results for orders placed or performed during the hospital encounter of 07/26/19   Glucose by meter   Result Value Ref Range    Glucose 186 (H) 70 - 99 mg/dL   CBC with platelets differential   Result Value Ref Range    WBC 8.3 4.0 - 11.0 10e9/L    RBC Count 4.75 4.4 - 5.9 10e12/L    Hemoglobin 14.5 13.3 - 17.7 g/dL    Hematocrit 43.8 40.0 - 53.0 %    MCV 92 78 - 100 fl    MCH 30.5 26.5 - 33.0 pg    MCHC 33.1 31.5 - 36.5 g/dL    RDW 13.0 10.0 - 15.0 %    Platelet Count 387 150 - 450 10e9/L    Diff Method Automated Method     % Neutrophils 67.9 %    % Lymphocytes 19.2 %    % Monocytes 6.4 %    % Eosinophils 3.4 %    % Basophils 0.8 %    % Immature Granulocytes 2.3 %    Nucleated RBCs 0 0 /100    Absolute Neutrophil 5.7 1.6 - 8.3 10e9/L    Absolute Lymphocytes 1.6 0.8 - 5.3 10e9/L    Absolute Monocytes 0.5 0.0 - 1.3 10e9/L    Absolute Eosinophils 0.3 0.0 - 0.7 10e9/L    Absolute Basophils 0.1 0.0 - 0.2 10e9/L    Abs Immature Granulocytes 0.2 0 - 0.4 10e9/L    Absolute Nucleated RBC 0.0    Comprehensive metabolic panel   Result Value Ref Range    Sodium 136 133 - 144 mmol/L    Potassium 3.6 3.4 - 5.3 mmol/L    Chloride 101 94 - 109 mmol/L    Carbon Dioxide 26 20 - 32 mmol/L    Anion Gap 9 3 - 14 mmol/L    Glucose 381 (H) 70 - 99 mg/dL    Urea Nitrogen 19 7 - 30 mg/dL    Creatinine 0.70 0.66 - 1.25 mg/dL    GFR Estimate >90 >60 mL/min/[1.73_m2]    GFR Estimate If Black >90 >60 mL/min/[1.73_m2]    Calcium 9.0 8.5 - 10.1 mg/dL    Bilirubin Total 0.6 0.2 - 1.3 mg/dL    Albumin 3.6 3.4 - 5.0 g/dL    Protein Total 7.7 6.8 - 8.8 g/dL     Alkaline Phosphatase 129 40 - 150 U/L    ALT 33 0 - 70 U/L    AST 25 0 - 45 U/L   Troponin I   Result Value Ref Range    Troponin I ES <0.015 0.000 - 0.045 ug/L   Glucose by meter   Result Value Ref Range    Glucose 347 (H) 70 - 99 mg/dL   EKG 12-lead, tracing only   Result Value Ref Range    Interpretation ECG Click View Image link to view waveform and result      *Note: Due to a large number of results and/or encounters for the requested time period, some results have not been displayed. A complete set of results can be found in Results Review.     Medications   0.9% sodium chloride BOLUS (0 mLs Intravenous Stopped 7/26/19 2216)   lidocaine HCl (XYLOCAINE) 2 % 15 mL, alum & mag hydroxide-simethicone (MYLANTA ES/MAALOX  ES) 15 mL GI Cocktail (30 mLs Oral Given 7/26/19 2054)   insulin aspart (NovoLOG) inj (RAPID ACTING) (8 Units Subcutaneous Given 7/26/19 2213)         I have reviewed the patient's prior chart including recent labs, ER visits, and available inpatient discharge summaries if available.      Assessments & Plan (with Medical Decision Making)   This is a 51-year-old male coming into the emergency room with complaints of epigastric pain on initial impression the patient is vitally stable and distress.  He is resting comfortably and a exam chair.  EKG shows normal sinus rhythm without signs of ectopy or ischemia.  His blood glucose is 347.  His abdominal exam is otherwise unremarkable.  Patient believes that his symptoms are most likely associated with some type of upset stomach.  He has no nausea or vomiting the complaint although that was what was in his triage note.  He has not been taking his insulin because he broke his insulin pen.  Patient was provided with a GI cocktail and IV fluids and had dramatic improvement of all the symptoms.  His hyperglycemia was covered with here with subcutaneous insulin.  I recommend that he get his insulin pen replaced.  I think the symptoms most likely associated with  "just being dehydrated.  Patient is eating and drinking and able to tolerate.  I believe he can be safely discharged at this time.      Pt was discharged home/self-care.  PT was provided written discharge instructions. Additionally verbal instructions were given and discussed with patient.  PT was asked to return to the ED immediately for any new or concerning symptoms.  Pt was in agreement, endorsed understanding, and questions were answered.     I have reviewed the nursing notes.    I have reviewed the findings, diagnosis, plan and need for follow up with the patient.       Medication List      There are no discharge medications for this visit.         Final diagnoses:   Epigastric pain     I, Dalila Varma, am serving as a trained medical scribe to document services personally performed by Torres Saenz MD, based on the provider's statements to me.      ITorres MD, was physically present and have reviewed and verified the accuracy of this note documented by Dalila Varma.       \"This dictation was performed with the assistance of voice recognition software and may contain inadvertant transcription  errors,  omissions and/or  inadvertent word substitution.\" --Torres Saenz MD     7/26/2019   [facility], EMERGENCY DEPARTMENT     Torres Saenz MD  07/28/19 0132    " No

## 2023-12-11 NOTE — TELEPHONE ENCOUNTER
MEDICAL RECORDS REQUEST   [facility]  Urology Clinic  [address] [place] [address]  PHONE: [phone]  Fax: [phone]                                                    FUTURE VISIT INFORMATION                                                   Andrew Collier, : 1965 scheduled for future visit at [facility]     APPOINTMENT INFORMATION:  Date: 01/10/2024  Provider:  Chevy Cohen MD  Reason for Visit/Diagnosis: consult for implant     RECORDS REQUESTED FOR VISIT                                                      NOTES   STATUS/DETAILS   DISCHARGE REPORT from the ER   yes, 2023 -- [facility] ED   MEDICATION LIST   yes   LABS       URINALYSIS (UA)   yes   IMAGES   yes, 2023, 2023, 2023 -- CT ABD PELVIS     2023 -- US TESTICULAR AND SCROTUM      PRE-VISIT CHECKLIST        Joint diagnostic appointment coordinated correctly          (ensure right order & amount of time) Yes   RECORD COLLECTION COMPLETE Yes

## 2024-01-04 ENCOUNTER — PRE VISIT (OUTPATIENT)
Dept: UROLOGY | Facility: CLINIC | Age: 59
End: 2024-01-04
Payer: COMMERCIAL

## 2024-01-04 NOTE — TELEPHONE ENCOUNTER
Reason for visit: consult for  Implant     Dx/Hx/Sx: scrotal pain     Records/imaging/labs/orders: in epic    At Rooming: standard

## 2024-01-10 ENCOUNTER — PRE VISIT (OUTPATIENT)
Dept: UROLOGY | Facility: CLINIC | Age: 59
End: 2024-01-10

## 2024-01-28 NOTE — PROVIDER NOTIFICATION
At 0959 temp 101.1, at 1034 temp 101.6. writer informed Ceci BUI. Ibuprofen given and blood culture ordered.  
Med Gold 9 paged:Pt post-prandial BG 301mg/dl, goal <180mg/dl. Unable to tell the last time he ate dinner as he keeps asking to snack from different staff. We will recheck BG at bedtime. Thank you.   
Provider notified RE:  FYI pt triggered sepsis, vitals taken.    
Provider notified RE:  Pt slowly increasing throughout the night. Wondering if Ibuprofen could be ordered it looks like this was successful yesterday.  
Provider paged re:   Can we get potassium tablets for replacement? pt was not able to drink powder potassium before he was made npo. Thanks!  
Provider paged re:  Pt's potassium is 3.3, do we need replacement?  
This is a 60M with history of recurrent Pancreatic cancer, CAD s/p 4 stents (last stent in 2020/2021), HTN, and HLD who presents to the hospital with complaints of diarrhea with abd pain and fevers x2 days. Patient is currently on gem/abraxane q2 weeks, last dosed on 1/24 on Wednesday, 2 days later started to have significant watery diarrhea with associated LLQ abd pain and fevers/chills. Denies blood or melena to his BMs. Reports having multiple episodes of diarrhea at home. Tried to take imodium but that did not help his symptoms and he therefore came to the hospital for evaluation. Also reports some nausea with his symptoms but no emesis. Denies abnormal/take out food intake and denies sick contacts at home. Has a recent travel to [facility] last week but did not have symptoms at that time. No other acute complaints. Pt states that he has had about 4 episodes of loose watery nonbloody diarrhea in the ED so far.     On arrival to the ED his vitals were T 100.9 -> 101.5 rectal, P 90, /76, RR 17, O2 sat 97% RA. His lab work showed mild anemia, no leukocytosis but with left shift on diff, thrombocytopenia, and a normal lactate. His CT A/P showed colitis from the transverse colon to the rectum. He was given NS 1L, vanc 1g, cefepime 2g, and ofirmev 1g. He was admitted to medicine.

## 2024-01-31 PROCEDURE — 99418 PROLNG IP/OBS E/M EA 15 MIN: CPT | Mod: FS | Performed by: STUDENT IN AN ORGANIZED HEALTH CARE EDUCATION/TRAINING PROGRAM

## 2024-02-05 ENCOUNTER — HOSPITAL ENCOUNTER (EMERGENCY)
Facility: CLINIC | Age: 59
Discharge: LEFT BEFORE DISCHARGE | End: 2024-02-05
Attending: EMERGENCY MEDICINE | Admitting: EMERGENCY MEDICINE
Payer: COMMERCIAL

## 2024-02-05 VITALS
HEART RATE: 97 BPM | OXYGEN SATURATION: 98 % | TEMPERATURE: 96.7 F | SYSTOLIC BLOOD PRESSURE: 126 MMHG | HEIGHT: 65 IN | RESPIRATION RATE: 18 BRPM | BODY MASS INDEX: 27.29 KG/M2 | DIASTOLIC BLOOD PRESSURE: 82 MMHG

## 2024-02-05 DIAGNOSIS — R11.2 NAUSEA AND VOMITING, UNSPECIFIED VOMITING TYPE: ICD-10-CM

## 2024-02-05 LAB
ALBUMIN SERPL BCG-MCNC: 4.3 G/DL (ref 3.5–5.2)
ALBUMIN UR-MCNC: NEGATIVE MG/DL
ALP SERPL-CCNC: 110 U/L (ref 40–150)
ALT SERPL W P-5'-P-CCNC: 20 U/L (ref 0–70)
ANION GAP SERPL CALCULATED.3IONS-SCNC: 14 MMOL/L (ref 7–15)
APPEARANCE UR: CLEAR
AST SERPL W P-5'-P-CCNC: ABNORMAL U/L
BASOPHILS # BLD AUTO: 0.1 10E3/UL (ref 0–0.2)
BASOPHILS NFR BLD AUTO: 1 %
BILIRUB SERPL-MCNC: 1.4 MG/DL
BILIRUB UR QL STRIP: NEGATIVE
BUN SERPL-MCNC: 19.5 MG/DL (ref 8–23)
CALCIUM SERPL-MCNC: 9.4 MG/DL (ref 8.8–10.2)
CHLORIDE SERPL-SCNC: 94 MMOL/L (ref 98–107)
COLOR UR AUTO: ABNORMAL
CREAT SERPL-MCNC: 0.66 MG/DL (ref 0.67–1.17)
DEPRECATED HCO3 PLAS-SCNC: 20 MMOL/L (ref 22–29)
EGFRCR SERPLBLD CKD-EPI 2021: >90 ML/MIN/1.73M2
EOSINOPHIL # BLD AUTO: 0.2 10E3/UL (ref 0–0.7)
EOSINOPHIL NFR BLD AUTO: 2 %
ERYTHROCYTE [DISTWIDTH] IN BLOOD BY AUTOMATED COUNT: 13 % (ref 10–15)
GLUCOSE BLDC GLUCOMTR-MCNC: 341 MG/DL (ref 70–99)
GLUCOSE SERPL-MCNC: 324 MG/DL (ref 70–99)
GLUCOSE UR STRIP-MCNC: >=1000 MG/DL
HCT VFR BLD AUTO: 43.7 % (ref 40–53)
HGB BLD-MCNC: 14.7 G/DL (ref 13.3–17.7)
HGB UR QL STRIP: NEGATIVE
IMM GRANULOCYTES # BLD: 0.1 10E3/UL
IMM GRANULOCYTES NFR BLD: 1 %
KETONES UR STRIP-MCNC: NEGATIVE MG/DL
LEUKOCYTE ESTERASE UR QL STRIP: NEGATIVE
LYMPHOCYTES # BLD AUTO: 2.3 10E3/UL (ref 0.8–5.3)
LYMPHOCYTES NFR BLD AUTO: 31 %
MAGNESIUM SERPL-MCNC: 1.9 MG/DL (ref 1.7–2.3)
MCH RBC QN AUTO: 28.5 PG (ref 26.5–33)
MCHC RBC AUTO-ENTMCNC: 33.6 G/DL (ref 31.5–36.5)
MCV RBC AUTO: 85 FL (ref 78–100)
MONOCYTES # BLD AUTO: 0.6 10E3/UL (ref 0–1.3)
MONOCYTES NFR BLD AUTO: 9 %
MUCOUS THREADS #/AREA URNS LPF: PRESENT /LPF
NEUTROPHILS # BLD AUTO: 4.3 10E3/UL (ref 1.6–8.3)
NEUTROPHILS NFR BLD AUTO: 56 %
NITRATE UR QL: NEGATIVE
NRBC # BLD AUTO: 0 10E3/UL
NRBC BLD AUTO-RTO: 0 /100
PH UR STRIP: 5.5 [PH] (ref 5–7)
PLATELET # BLD AUTO: 385 10E3/UL (ref 150–450)
POTASSIUM SERPL-SCNC: 4.2 MMOL/L (ref 3.4–5.3)
PROT SERPL-MCNC: 7.5 G/DL (ref 6.4–8.3)
RBC # BLD AUTO: 5.15 10E6/UL (ref 4.4–5.9)
RBC URINE: <1 /HPF
SODIUM SERPL-SCNC: 128 MMOL/L (ref 135–145)
SP GR UR STRIP: 1.03 (ref 1–1.03)
TRANSITIONAL EPI: <1 /HPF
UROBILINOGEN UR STRIP-MCNC: NORMAL MG/DL
WBC # BLD AUTO: 7.5 10E3/UL (ref 4–11)
WBC URINE: <1 /HPF

## 2024-02-05 PROCEDURE — 36415 COLL VENOUS BLD VENIPUNCTURE: CPT | Performed by: EMERGENCY MEDICINE

## 2024-02-05 PROCEDURE — 82962 GLUCOSE BLOOD TEST: CPT

## 2024-02-05 PROCEDURE — 258N000003 HC RX IP 258 OP 636: Performed by: EMERGENCY MEDICINE

## 2024-02-05 PROCEDURE — 99283 EMERGENCY DEPT VISIT LOW MDM: CPT | Performed by: EMERGENCY MEDICINE

## 2024-02-05 PROCEDURE — 84460 ALANINE AMINO (ALT) (SGPT): CPT | Performed by: EMERGENCY MEDICINE

## 2024-02-05 PROCEDURE — 999N000248 HC STATISTIC IV INSERT WITH US BY RN

## 2024-02-05 PROCEDURE — 81001 URINALYSIS AUTO W/SCOPE: CPT | Performed by: EMERGENCY MEDICINE

## 2024-02-05 PROCEDURE — 84155 ASSAY OF PROTEIN SERUM: CPT | Performed by: EMERGENCY MEDICINE

## 2024-02-05 PROCEDURE — 83735 ASSAY OF MAGNESIUM: CPT | Performed by: EMERGENCY MEDICINE

## 2024-02-05 PROCEDURE — 85025 COMPLETE CBC W/AUTO DIFF WBC: CPT | Performed by: EMERGENCY MEDICINE

## 2024-02-05 RX ADMIN — SODIUM CHLORIDE 1000 ML: 9 INJECTION, SOLUTION INTRAVENOUS at 12:44

## 2024-02-05 ASSESSMENT — ACTIVITIES OF DAILY LIVING (ADL)
ADLS_ACUITY_SCORE: 37
ADLS_ACUITY_SCORE: 37

## 2024-02-05 NOTE — ED TRIAGE NOTES
Pt ambulatory from home with vomiting, diarrhea, and cramping X2 days. Hx of recurrent SBOs.     Triage Assessment (Adult)       Row Name 02/05/24 1019          Triage Assessment    Airway WDL WDL        Respiratory WDL    Respiratory WDL WDL        Cardiac WDL    Cardiac WDL WDL        Peripheral/Neurovascular WDL    Peripheral Neurovascular WDL WDL        Cognitive/Neuro/Behavioral WDL    Cognitive/Neuro/Behavioral WDL WDL        [place] Coma Scale    Best Eye Response 4-->(E4) spontaneous     Best Motor Response 6-->(M6) obeys commands     Best Verbal Response 5-->(V5) oriented     Sheyla Coma Scale Score 15

## 2024-02-05 NOTE — ED NOTES
"Pt asked RN to removed his IV while in lobby, stating \"I need to go, please.\" Pt has frequently visited this ER and left AMA. Was educated on risks and verbalizes understanding. IV removed prior to pt leaving. Declined to sign AMA form.   "

## 2024-02-05 NOTE — DISCHARGE INSTRUCTIONS
Try to stay hydrated. Follow up with your primary care provider in the next 2-3 days. Return to the ED if your symptoms worsen.

## 2024-02-06 ENCOUNTER — LAB (OUTPATIENT)
Dept: LAB | Facility: CLINIC | Age: 59
End: 2024-02-06
Payer: COMMERCIAL

## 2024-02-06 ENCOUNTER — OFFICE VISIT (OUTPATIENT)
Dept: INFECTIOUS DISEASES | Facility: CLINIC | Age: 59
End: 2024-02-06
Payer: COMMERCIAL

## 2024-02-06 DIAGNOSIS — E11.65 TYPE 2 DIABETES MELLITUS WITH HYPERGLYCEMIA, WITH LONG-TERM CURRENT USE OF INSULIN (H): ICD-10-CM

## 2024-02-06 DIAGNOSIS — B20 HUMAN IMMUNODEFICIENCY VIRUS (HIV) DISEASE (H): Primary | ICD-10-CM

## 2024-02-06 DIAGNOSIS — Z79.4 TYPE 2 DIABETES MELLITUS WITH HYPERGLYCEMIA, WITH LONG-TERM CURRENT USE OF INSULIN (H): ICD-10-CM

## 2024-02-06 DIAGNOSIS — B20 HUMAN IMMUNODEFICIENCY VIRUS (HIV) DISEASE (H): ICD-10-CM

## 2024-02-06 LAB
ALBUMIN SERPL BCG-MCNC: 4 G/DL (ref 3.5–5.2)
ALP SERPL-CCNC: 166 U/L (ref 40–150)
ALT SERPL W P-5'-P-CCNC: 24 U/L (ref 0–70)
ANION GAP SERPL CALCULATED.3IONS-SCNC: 9 MMOL/L (ref 7–15)
AST SERPL W P-5'-P-CCNC: 31 U/L (ref 0–45)
BASOPHILS # BLD AUTO: 0.1 10E3/UL (ref 0–0.2)
BASOPHILS NFR BLD AUTO: 1 %
BILIRUB SERPL-MCNC: 0.5 MG/DL
BUN SERPL-MCNC: 13.6 MG/DL (ref 8–23)
CALCIUM SERPL-MCNC: 8.8 MG/DL (ref 8.8–10.2)
CD3 CELLS # BLD: 1531 CELLS/UL (ref 603–2990)
CD3 CELLS NFR BLD: 67 % (ref 49–84)
CD3+CD4+ CELLS # BLD: 534 CELLS/UL (ref 441–2156)
CD3+CD4+ CELLS NFR BLD: 23 % (ref 28–63)
CD3+CD4+ CELLS/CD3+CD8+ CLL BLD: 0.56 % (ref 1.4–2.6)
CD3+CD8+ CELLS # BLD: 945 CELLS/UL (ref 125–1312)
CD3+CD8+ CELLS NFR BLD: 41 % (ref 10–40)
CHLORIDE SERPL-SCNC: 103 MMOL/L (ref 98–107)
CREAT SERPL-MCNC: 0.61 MG/DL (ref 0.67–1.17)
DEPRECATED HCO3 PLAS-SCNC: 22 MMOL/L (ref 22–29)
EGFRCR SERPLBLD CKD-EPI 2021: >90 ML/MIN/1.73M2
EOSINOPHIL # BLD AUTO: 0.2 10E3/UL (ref 0–0.7)
EOSINOPHIL NFR BLD AUTO: 2 %
ERYTHROCYTE [DISTWIDTH] IN BLOOD BY AUTOMATED COUNT: 13 % (ref 10–15)
GLUCOSE SERPL-MCNC: 460 MG/DL (ref 70–99)
HCT VFR BLD AUTO: 38.3 % (ref 40–53)
HGB BLD-MCNC: 13.6 G/DL (ref 13.3–17.7)
IMM GRANULOCYTES # BLD: 0 10E3/UL
IMM GRANULOCYTES NFR BLD: 0 %
LYMPHOCYTES # BLD AUTO: 2.1 10E3/UL (ref 0.8–5.3)
LYMPHOCYTES NFR BLD AUTO: 28 %
MCH RBC QN AUTO: 29.2 PG (ref 26.5–33)
MCHC RBC AUTO-ENTMCNC: 35.5 G/DL (ref 31.5–36.5)
MCV RBC AUTO: 82 FL (ref 78–100)
MONOCYTES # BLD AUTO: 0.5 10E3/UL (ref 0–1.3)
MONOCYTES NFR BLD AUTO: 7 %
NEUTROPHILS # BLD AUTO: 4.6 10E3/UL (ref 1.6–8.3)
NEUTROPHILS NFR BLD AUTO: 62 %
NRBC # BLD AUTO: 0 10E3/UL
NRBC BLD AUTO-RTO: 0 /100
PLATELET # BLD AUTO: 360 10E3/UL (ref 150–450)
POTASSIUM SERPL-SCNC: 4 MMOL/L (ref 3.4–5.3)
PROT SERPL-MCNC: 6.9 G/DL (ref 6.4–8.3)
RBC # BLD AUTO: 4.66 10E6/UL (ref 4.4–5.9)
SODIUM SERPL-SCNC: 134 MMOL/L (ref 135–145)
T CELL COMMENT: ABNORMAL
T PALLIDUM AB SER QL: NONREACTIVE
WBC # BLD AUTO: 7.4 10E3/UL (ref 4–11)

## 2024-02-06 PROCEDURE — 99000 SPECIMEN HANDLING OFFICE-LAB: CPT | Performed by: PATHOLOGY

## 2024-02-06 PROCEDURE — 87536 HIV-1 QUANT&REVRSE TRNSCRPJ: CPT | Performed by: STUDENT IN AN ORGANIZED HEALTH CARE EDUCATION/TRAINING PROGRAM

## 2024-02-06 PROCEDURE — 86360 T CELL ABSOLUTE COUNT/RATIO: CPT | Performed by: STUDENT IN AN ORGANIZED HEALTH CARE EDUCATION/TRAINING PROGRAM

## 2024-02-06 PROCEDURE — 86359 T CELLS TOTAL COUNT: CPT | Performed by: STUDENT IN AN ORGANIZED HEALTH CARE EDUCATION/TRAINING PROGRAM

## 2024-02-06 PROCEDURE — 85025 COMPLETE CBC W/AUTO DIFF WBC: CPT | Performed by: PATHOLOGY

## 2024-02-06 PROCEDURE — 83036 HEMOGLOBIN GLYCOSYLATED A1C: CPT | Performed by: STUDENT IN AN ORGANIZED HEALTH CARE EDUCATION/TRAINING PROGRAM

## 2024-02-06 PROCEDURE — 36415 COLL VENOUS BLD VENIPUNCTURE: CPT | Performed by: PATHOLOGY

## 2024-02-06 PROCEDURE — 86780 TREPONEMA PALLIDUM: CPT | Performed by: STUDENT IN AN ORGANIZED HEALTH CARE EDUCATION/TRAINING PROGRAM

## 2024-02-06 PROCEDURE — 80053 COMPREHEN METABOLIC PANEL: CPT | Performed by: PATHOLOGY
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
## 2024-02-07 ENCOUNTER — OFFICE VISIT (OUTPATIENT)
Dept: PHARMACY | Facility: CLINIC | Age: 59
End: 2024-02-07
Payer: COMMERCIAL

## 2024-02-07 ENCOUNTER — OFFICE VISIT (OUTPATIENT)
Dept: INFECTIOUS DISEASES | Facility: CLINIC | Age: 59
End: 2024-02-07
Attending: INTERNAL MEDICINE
Payer: COMMERCIAL

## 2024-02-07 VITALS
DIASTOLIC BLOOD PRESSURE: 83 MMHG | TEMPERATURE: 98.2 F | BODY MASS INDEX: 25.99 KG/M2 | HEART RATE: 96 BPM | WEIGHT: 156.2 LBS | SYSTOLIC BLOOD PRESSURE: 147 MMHG | OXYGEN SATURATION: 97 %

## 2024-02-07 DIAGNOSIS — Z11.3 ROUTINE SCREENING FOR STI (SEXUALLY TRANSMITTED INFECTION): ICD-10-CM

## 2024-02-07 DIAGNOSIS — Z59.812 HOUSING INSTABILITY, CURRENTLY HOUSED, HOMELESS IN PAST 12 MONTHS: ICD-10-CM

## 2024-02-07 DIAGNOSIS — E11.65 TYPE 2 DIABETES MELLITUS WITH HYPERGLYCEMIA, WITH LONG-TERM CURRENT USE OF INSULIN (H): ICD-10-CM

## 2024-02-07 DIAGNOSIS — Z79.4 TYPE 2 DIABETES MELLITUS WITH HYPERGLYCEMIA, WITH LONG-TERM CURRENT USE OF INSULIN (H): ICD-10-CM

## 2024-02-07 DIAGNOSIS — Z21 HUMAN IMMUNODEFICIENCY VIRUS I INFECTION (H): Primary | ICD-10-CM

## 2024-02-07 DIAGNOSIS — R85.612: ICD-10-CM

## 2024-02-07 DIAGNOSIS — Z21 ASYMPTOMATIC HUMAN IMMUNODEFICIENCY VIRUS (HIV) INFECTION STATUS (H): Primary | ICD-10-CM

## 2024-02-07 PROCEDURE — G0463 HOSPITAL OUTPT CLINIC VISIT: HCPCS | Performed by: STUDENT IN AN ORGANIZED HEALTH CARE EDUCATION/TRAINING PROGRAM

## 2024-02-07 PROCEDURE — 87591 N.GONORRHOEAE DNA AMP PROB: CPT | Performed by: STUDENT IN AN ORGANIZED HEALTH CARE EDUCATION/TRAINING PROGRAM

## 2024-02-07 PROCEDURE — 99215 OFFICE O/P EST HI 40 MIN: CPT | Mod: GC | Performed by: STUDENT IN AN ORGANIZED HEALTH CARE EDUCATION/TRAINING PROGRAM

## 2024-02-07 PROCEDURE — 99605 MTMS BY PHARM NP 15 MIN: CPT | Performed by: PHARMACIST

## 2024-02-07 PROCEDURE — 99607 MTMS BY PHARM ADDL 15 MIN: CPT | Performed by: PHARMACIST

## 2024-02-07 RX ORDER — GLIPIZIDE 5 MG/1
5 TABLET, FILM COATED, EXTENDED RELEASE ORAL DAILY
Qty: 30 TABLET | Refills: 2 | Status: SHIPPED | OUTPATIENT
Start: 2024-02-07 | End: 2024-04-10

## 2024-02-07 RX ORDER — BICTEGRAVIR SODIUM, EMTRICITABINE, AND TENOFOVIR ALAFENAMIDE FUMARATE 50; 200; 25 MG/1; MG/1; MG/1
1 TABLET ORAL DAILY
Qty: 30 TABLET | Refills: 2 | Status: SHIPPED | OUTPATIENT
Start: 2024-02-07 | End: 2024-07-23

## 2024-02-07 SDOH — ECONOMIC STABILITY - HOUSING INSECURITY: HOMELESS IN THE PAST 12 MONTHS: Z59.812

## 2024-02-07 ASSESSMENT — PAIN SCALES - GENERAL: PAINLEVEL: NO PAIN (0)

## 2024-02-07 NOTE — NURSING NOTE
Chief Complaint   Patient presents with    RECHECK     B 20 f/u       BP (!) 147/83   Pulse 96   Temp 98.2  F (36.8  C) (Oral)   Wt 70.9 kg (156 lb 3.2 oz)   SpO2 97%   BMI 25.99 kg/m      Kuldip Brian on 2/7/2024 at 2:52 PM

## 2024-02-07 NOTE — Clinical Note
2/7/2024       RE: Andrew Collier  [address]  [facility] [address]     Dear Colleague,    Thank you for referring your patient, Andrew Collier, to the [facility] INFECTIOUS DISEASE CLINIC [place] [facility]. Please see a copy of my visit note below.     [facility]    Division of Infectious Diseases and International Medicine    CLINICAL INFECTIOUS DISEASE OUTPATIENT CONSULTATION NOTE     Patient:  Andrew Collier, Date of birth 1965, Medical record number [id number]  Referred by: No ref. provider found   Reason for Visit: Reestablish care          Assessment and Plan   HIV  Diagnosed 2015; previous therapy with Triumeq, Genvoya, now on Biktarvy. Had been undetectable for the last few years with relatively good adherence. Social barriers to care include h/o being unhoused, possible comorbid substance use. Stopped Biktarvy for ~3 months last year as he was feeling good. Then resumed and subsequently his refills ran out in November and is now here to reestablish care. Is motivated to restart Biktarvy, tolerating this well.   - Refill Biktarvy  - Follow up in 1 month   - Would benefit from connection to  in the future given housing/food instability   - Routine STI (urine and pharyngeal per patient)  - Vaccinations currently up to date, declines COVID booster     T2DM, poorly controlled  Not taking oral agents, reports a bad side effect to Metformin. Does use his insulin intermittently but does not check his sugars. Waits until he is symptomatic from hyperglycemia and then presents to the ED.  I am very worried about the potential consequences for chronically uncontrolled DM (risk of retinopathy, stroke, CVD, etc). I did discuss this with him. I am hopeful that stable housing will help facilitate his DM management. Also discussed the role for a Dexcom or similar and he is open to this. Given  he is not taking his prescribed oral agents we recommended possible role for ER Glipizide which he is open to. Plan for follow up with [facility] pharmacy in 1 week.   - Continue current Glargine   - ER Glipizide   - UA   - [facility] follow up in 1 week   - We will work on possibility of continuous glucose monitor     LSIL  Diagnosed 6/22/18. Follows with CRS but exams and follow up has not been consistent. 9/9/20 anoscopy and fulguration in the OR. 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 No invasive malignancy. Recommend follow up. Will need to address at next follow up as this is my first official visit with him.        History of Present Illness:     Andrew Collier is a 58 year old y.o with a PMH of who presents to clinic to reestablish care.     Last seen in clinic 8/2021 by Dr Ivory. Previously homeless but was staying at [facility]. Now has an apartment and is excited about the idea of having stable housing. Denies food insecurity but does report not having a lot of options for low carb items. He is not taking oral antihyperglycemics and had a bad reaction to Metformin so does not want to resume this. Uses his insulin intermittently. Does not check his blood sugars at all. States he can feel when when his BG is high. Would be open to a continuous glucose monitor. Had one before but there were insurance barriers when he would lose sensors given his homelessness. Takes the train to appointments and denies transportation barriers to care.     With regards to his HIV management has been on Biktarvy most recently. Took a 3 month break from the Biktarvy in the last year but has otherwise been undetectable in the past. Resumed his meds but then ran out of refills in November 2023. Tolerating this medication well. Is sexually active with male partners. Is agreeable to STI testing including G/C pharyngeal and urine. Labs are pending from yesterday but CD4 >500. Is interested in resuming his  medications today. Fills at the pharmacy downstairs.          HIV History:   Date of Diagnosis: 2015   Approximated time of transmission:   CD4 Josue: 24/2%  Viral Load at Diagnosis: 171,654  Opportunistic Infections: CNS toxoplasmosis   CMV Status: seropositive   Toxo Status: seropositive , h/o CNS toxoplasmosis s/p Clindamycin and Pyrimethamine then transitioned to Bactrim ppx now off of ppx   Tuberculosis Screenin negative   Historical use of ARVs: 2015 started Triumeq, 2015 switched to Genvoya, 2019 switched to Biktarvy   Historical Resistance Mutations: not in system 2018 genotype cancelled given undetectable        Key Prior Lab/Imaging and other data   23   Treponemal Ab negative   CD4 abs 534  HIV VL IP   BMP - mildly elevated Alk Phos (stable), glucose markedly elevated 460       Review of Systems:     The following systems were reviewed with the patient as they pertain to the case and are negative unless noted here or above in the HPI. The patient was  able to participate in the following review of systems    REVIEW OF SYSTEMS:   10 point ROS is conducted and is otherwise negative unless noted in the HPI        Other Medical History:     Attempt was made to collect past, family and social history during this encounter,  this information was reviewed with the patient and updated    Allergies Fentanyl, Tylenol [acetaminophen], Dulaglutide, Insulin lispro, and Penicillin v    Past Medical History  Past Medical History:   Diagnosis Date    Acute appendicitis with localized peritonitis 2018    AIDS (H)     Allergic rhinitis due to other allergen     DNS    Anal dysplasia     Chronic abdominal pain     CNS toxoplasmosis (H)     COVID-19 2022    Diabetes type 2, controlled (H)     GERD (gastroesophageal reflux disease)     Herpes zoster 2016    History of seizure     History of substance abuse (H)     HIV (human immunodeficiency virus infection) (H)     HLD  (hyperlipidemia)     Lung nodules     Periungual wart     Pneumonia 1/6/2019    PTSD (post-traumatic stress disorder)     Sleep apnea     doesn't use CPAP    PastSurgical History   has a past surgical history that includes NONSPECIFIC PROCEDURE; EXPLORE UNDESC TESTIS,INGUIN/SCROTAL; Optical tracking system craniotomy, excise tumor, combined (Left, 4/10/2015); Colonoscopy (Left, 1/22/2016); Laparoscopy diagnostic (general) (N/A, 7/26/2016); Repair ligament ulnar collateral thumb (gamekeeper's) (Right, 12/2/2016); Laparoscopic appendectomy (N/A, 1/31/2018); Laparoscopy diagnostic (general) (N/A, 4/16/2018); Anoscopy (N/A, 9/9/2020); and Esophagoscopy, gastroscopy, duodenoscopy (EGD), combined (N/A, 6/6/2022).   Family History  Family History   Problem Relation Age of Onset    Diabetes Brother     Diabetes Father     Alzheimer Disease Father     Unknown/Adopted Mother     Diabetes Paternal Grandfather     Cancer No family hx of         no skin cancer    Skin Cancer No family hx of         no famiy hx of skin cancer    Glaucoma No family hx of     Macular Degeneration No family hx of     Social History  He reports that he has been smoking cigarettes. He has a 10 pack-year smoking history. He has quit using smokeless tobacco. He reports that he does not currently use drugs after having used the following drugs: Marijuana and Methamphetamines. He reports that he does not drink alcohol.   Notable Exposures Listed below if pertinent    Vaccination History:  Immunization History   Administered Date(s) Administered    COVID-19 MONOVALENT 12+ (Pfizer) 06/17/2021, 07/29/2021, 02/10/2022    HepB 03/10/2016, 06/16/2016    Hepatitis B, Adult 08/31/2017    Influenza (IIV3) PF 10/17/2016    Influenza Vaccine 18-64 (Flublok) 01/17/2020, 09/28/2020    Influenza Vaccine >6 months,quad, PF 12/22/2015, 12/14/2017    Mantoux Tuberculin Skin Test 04/12/2015    Meningococcal ACWY (Menactra ) 08/31/2015, 03/10/2016    Pneumo Conj 13-V  (2010&after) 03/10/2016    Pneumococcal 23 valent 06/16/2016    TDAP (Adacel,Boostrix) 06/18/2014    TDAP Vaccine (Boostrix) 10/17/2016             Physical Exam:     VITAL SIGNS:  Blood pressure (!) 147/83, pulse 96, temperature 98.2  F (36.8  C), temperature source Oral, weight 70.9 kg (156 lb 3.2 oz), SpO2 97%.     GENERAL APPEARANCE: Not in acute distress  PHYSICAL EXAM:   Eyes:     No ptosis, no discharge, no scleral icterus  Mouth, Throat:     mucous membranes moist  Respiratory:     Inspection: Not in respiratory distress, Chest expansion symmetrical  Musculoskeletal:     No elbow wrist knee or ankle tenderness, deformity or swelling, no quadriceps calf or upper arm muscular tenderness noted  Skin:     Dry and intact  Neurologic:     Higher Mental Function: Conversant, AOx4   Facial asymmetry grossly absent   Patient is ambulatory   Psychiatric:     Appropriate        Signature:     Anna Dailey MD   Infectious Disease Fellow    Case discussed with the supervising attending ID physician, Dr Austin     This dictation was prepared in part using Dragon recognition software.  As a result errors may occur. When identified these transcription errors have been corrected.  While every attempt is made to correct errors during dictation, errors may still exist         Again, thank you for allowing me to participate in the care of your patient.      Sincerely,    Anna Dailey MD

## 2024-02-07 NOTE — PROGRESS NOTES
[facility]    Division of Infectious Diseases and International Medicine    CLINICAL INFECTIOUS DISEASE OUTPATIENT CONSULTATION NOTE     Patient:  Andrew Collier, Date of birth 1965, Medical record number [id number]  Referred by: No ref. provider found   Reason for Visit: Reestablish care          Assessment and Plan   HIV  Diagnosed 2015; previous therapy with Triumeq, Genvoya, now on Biktarvy. Had been undetectable for the last few years with relatively good adherence. Social barriers to care include h/o being unhoused, possible comorbid substance use. Stopped Biktarvy for ~3 months last year as he was feeling good. Then resumed and subsequently his refills ran out in November and is now here to reestablish care. Is motivated to restart Biktarvy, tolerating this well.   - Refill Biktarvy  - Follow up in 1 month   - Would benefit from connection to  in the future given housing/food instability   - Routine STI (urine and pharyngeal per patient)  - Vaccinations currently up to date, declines COVID booster     T2DM, poorly controlled  Not taking oral agents, reports a bad side effect to Metformin. Does use his insulin intermittently but does not check his sugars. Waits until he is symptomatic from hyperglycemia and then presents to the ED.  I am very worried about the potential consequences for chronically uncontrolled DM (risk of retinopathy, stroke, CVD, etc). I did discuss this with him. I am hopeful that stable housing will help facilitate his DM management. Also discussed the role for a Dexcom or similar and he is open to this. Given he is not taking his prescribed oral agents we recommended possible role for ER Glipizide which he is open to. Plan for follow up with [facility] pharmacy in 1 week.   - Continue current Glargine   - ER Glipizide   - UA   - [facility] follow up in 1 week   - We will work on possibility of continuous glucose monitor     LSIL  Diagnosed 6/22/18.  ______________________________________________________________     MEDICARE WELLNESS VISIT NOTE      HISTORY OF PRESENT ILLNESS:   Vikash Holley presents for his Subsequent Annual Medicare Wellness Visit.   He has complaints or concerns which include follow up on several chronic problems.  See separate Evaluation and Management note. .      Patient Care Team:  Ken Medrano MD as PCP - General (Family Practice)  Titi Cedeno MD as Gastroenterologist (Gastroenterology)        Patient Active Problem List    Diagnosis Date Noted   • Encounter for long-term (current) use of medications 07/07/2017     Priority: Low   • Subcutaneous mass of left cheek 07/07/2016     Priority: Low     Noted ~late May 2016, nontender, question of sebaceous cyst but some tethering of overlying skin.     • Obesity, unspecified 02/24/2013     Priority: Low   • Tubular adenoma of colon      Priority: Low     Last colonoscopy with polypectomy 10/2013.  Due 2018.     • Essential hypertension, benign 02/22/2013     Priority: Low     On lisinopril  Last DASH review: 1/6/2017             Last overview discussion: 1/6/2017       • Hyperlipidemia 02/22/2013     Priority: Low     On atorvastatin 5mg daily  Modified American Heart Association guidelines reviewed on elevated cholesterol management on 3/18/2014      • Type 2 diabetes mellitus with neurologic complication (CMS/HCC) 02/22/2013     Priority: Low     On maximum metformin, glipizide and added Lantus.           Past Medical History:   Diagnosis Date   • HTN (hypertension)    • Hyperlipidemia    • Tubular adenoma of colon    • Type 2 diabetes mellitus (CMS/HCC)          Past Surgical History:   Procedure Laterality Date   • COLONOSCOPY W/ POLYPECTOMY  10/11/2010, 10/28/2013         Social History   Substance Use Topics   • Smoking status: Former Smoker   • Smokeless tobacco: Never Used   • Alcohol use 0.0 oz/week      Comment: 1-2     Drug use:    Drug Use:    No              Family  Follows with CRS but exams and follow up has not been consistent. 9/9/20 anoscopy and fulguration in the OR. 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 No invasive malignancy. Recommend follow up. Will need to address at next follow up as this is my first official visit with him.        History of Present Illness:     Andrew Collier is a 58 year old y.o with a PMH of who presents to clinic to reestablish care.     Last seen in clinic 8/2021 by Dr Ivory. Previously homeless but was staying at [facility]. Now has an apartment and is excited about the idea of having stable housing. Denies food insecurity but does report not having a lot of options for low carb items. He is not taking oral antihyperglycemics and had a bad reaction to Metformin so does not want to resume this. Uses his insulin intermittently. Does not check his blood sugars at all. States he can feel when when his BG is high. Would be open to a continuous glucose monitor. Had one before but there were insurance barriers when he would lose sensors given his homelessness. Takes the train to appointments and denies transportation barriers to care.     With regards to his HIV management has been on Biktarvy most recently. Took a 3 month break from the Biktarvy in the last year but has otherwise been undetectable in the past. Resumed his meds but then ran out of refills in November 2023. Tolerating this medication well. Is sexually active with male partners. Is agreeable to STI testing including G/C pharyngeal and urine. Labs are pending from yesterday but CD4 >500. Is interested in resuming his medications today. Fills at the pharmacy downstairs.          HIV History:   Date of Diagnosis: 4/2015   Approximated time of transmission: 2008  CD4 Josue: 24/2%  Viral Load at Diagnosis: 171,654  Opportunistic Infections: CNS toxoplasmosis   CMV Status: seropositive   Toxo Status: seropositive 2015, h/o CNS toxoplasmosis s/p Clindamycin and  History - Reviewed    Medication Sig   • glipiZIDE (GLUCOTROL ER) 10 MG 24 hr tablet TAKE 2 TABLETS DAILY   • atorvastatin (LIPITOR) 10 MG tablet TAKE ONE-HALF (1/2) TABLET DAILY   • lisinopril (PRINIVIL,ZESTRIL) 40 MG tablet TAKE 1 TABLET DAILY   • LANTUS SOLOSTAR 100 UNIT/ML pen-injector INJECT 20 UNITS UNDER THE SKIN NIGHTLY   • metformin (GLUCOPHAGE-XR) 500 MG 24 hr tablet TAKE 4 TABLETS DAILY   • FREESTYLE LITE test strip TEST THREE TIMES A DAY (INSULIN DEPENDENT DIABETES)   • pioglitazone (ACTOS) 15 MG tablet TAKE 1 TABLET DAILY FOR TYPE 2 DIABETES   • penicillin v potassium (VEETID) 500 MG tablet Take 2 tablets by mouth now, then take 1 tablet every 6 hours until gone.   • B-D U/F PEN NEEDLE 31G X 5 MM Misc USE WITH LANTUS SOLOSTAR   • colchicine (COLCRYS) 0.6 MG tablet Take 1 tablet by mouth 2 times daily.   • DISPENSE CM CORE 2 CAPS PO DAILY   • Lipoic Acid 100 MG CAPS Take  by mouth.   • Omega 3 1000 MG capsule Take 1,000 mg by mouth daily.   • Multiple Vitamins-Minerals (DAILY HEART HEALTH SUPPORT) MISC Take  by mouth.   • vitamin E (NATURAL VITAMIN E) 100 UNITS capsule Take 1 capsule by mouth daily.   • Cholecalciferol (VITAMIN D) 2000 UNITS CAPS Take  by mouth.   • DISPENSE Concentrated fruits and veges   • Misc Natural Products (SAW PALMETTO PLUS) CAPS Take  by mouth.   • Misc Natural Products (LUTEIN VISION BLEND) CAPS Take  by mouth.   • DISPENSE Carb blocker   • Coenzyme Q10 (CO Q 10) 10 MG CAPS Take  by mouth.   • Garlic 100 MG TABS Take  by mouth.   • Linoleic Acid Conjugated (CLA) 500 MG CAPS Take  by mouth.   • Bioflavonoid Products (VITAMIN C PLUS) 500 MG TABS Take  by mouth.   • Cinnamon 500 MG CAPS Take  by mouth.   • Glucosamine 750 MG TABS Take  by mouth.     No current facility-administered medications for this visit.         Medicare Health Risk Assessment in electronic health record reviewed. The following items were identified and addressed:    Other:  bodily pain  Vision and hearing  Pyrimethamine then transitioned to Bactrim ppx now off of ppx   Tuberculosis Screenin negative   Historical use of ARVs: 2015 started Triumeq, 2015 switched to Genvoya, 2019 switched to Biktarvy   Historical Resistance Mutations: not in system 2018 genotype cancelled given undetectable        Key Prior Lab/Imaging and other data   23   Treponemal Ab negative   CD4 abs 534  HIV VL IP   BMP - mildly elevated Alk Phos (stable), glucose markedly elevated 460       Review of Systems:     The following systems were reviewed with the patient as they pertain to the case and are negative unless noted here or above in the HPI. The patient was  able to participate in the following review of systems    REVIEW OF SYSTEMS:   10 point ROS is conducted and is otherwise negative unless noted in the HPI        Other Medical History:     Attempt was made to collect past, family and social history during this encounter,  this information was reviewed with the patient and updated    Allergies Fentanyl, Tylenol [acetaminophen], Dulaglutide, Insulin lispro, and Penicillin v    Past Medical History  Past Medical History:   Diagnosis Date    Acute appendicitis with localized peritonitis 2018    AIDS (H)     Allergic rhinitis due to other allergen     DNS    Anal dysplasia     Chronic abdominal pain     CNS toxoplasmosis (H)     COVID-19 2022    Diabetes type 2, controlled (H)     GERD (gastroesophageal reflux disease)     Herpes zoster 2016    History of seizure     History of substance abuse (H)     HIV (human immunodeficiency virus infection) (H)     HLD (hyperlipidemia)     Lung nodules     Periungual wart     Pneumonia 2019    PTSD (post-traumatic stress disorder)     Sleep apnea     doesn't use CPAP    PastSurgical History   has a past surgical history that includes NONSPECIFIC PROCEDURE; EXPLORE UNDESC TESTIS,INGUIN/SCROTAL; Optical tracking system craniotomy, excise tumor, combined (Left,  screens documented:  done  Advanced Directive: Patient doesn't have an Advance Directives document, and is not interested in additional information  Cognitive Assessment: no evidence of cognitive dysfunction by direct observation      Needed Screening/Treatment:   Cardiovascular screening - Abdominal aortic US, Glaucoma screen / eye exam , Hepatitis C and Immunizations reviewed and patient needs: Influenza, Pneumococcal 23 and Prevnar 13 - he declines immunizations.    Needed follow up:  None    See orders.   See Patient Instructions section.   Return in about 1 year (around 1/8/2019) for Medicare Wellness Visit.     4/10/2015); Colonoscopy (Left, 1/22/2016); Laparoscopy diagnostic (general) (N/A, 7/26/2016); Repair ligament ulnar collateral thumb (gamekeeper's) (Right, 12/2/2016); Laparoscopic appendectomy (N/A, 1/31/2018); Laparoscopy diagnostic (general) (N/A, 4/16/2018); Anoscopy (N/A, 9/9/2020); and Esophagoscopy, gastroscopy, duodenoscopy (EGD), combined (N/A, 6/6/2022).   Family History  Family History   Problem Relation Age of Onset    Diabetes Brother     Diabetes Father     Alzheimer Disease Father     Unknown/Adopted Mother     Diabetes Paternal Grandfather     Cancer No family hx of         no skin cancer    Skin Cancer No family hx of         no famiy hx of skin cancer    Glaucoma No family hx of     Macular Degeneration No family hx of     Social History  He reports that he has been smoking cigarettes. He has a 10 pack-year smoking history. He has quit using smokeless tobacco. He reports that he does not currently use drugs after having used the following drugs: Marijuana and Methamphetamines. He reports that he does not drink alcohol.   Notable Exposures Listed below if pertinent    Vaccination History:  Immunization History   Administered Date(s) Administered    COVID-19 MONOVALENT 12+ (Pfizer) 06/17/2021, 07/29/2021, 02/10/2022    HepB 03/10/2016, 06/16/2016    Hepatitis B, Adult 08/31/2017    Influenza (IIV3) PF 10/17/2016    Influenza Vaccine 18-64 (Flublok) 01/17/2020, 09/28/2020    Influenza Vaccine >6 months,quad, PF 12/22/2015, 12/14/2017    Mantoux Tuberculin Skin Test 04/12/2015    Meningococcal ACWY (Menactra ) 08/31/2015, 03/10/2016    Pneumo Conj 13-V (2010&after) 03/10/2016    Pneumococcal 23 valent 06/16/2016    TDAP (Adacel,Boostrix) 06/18/2014    TDAP Vaccine (Boostrix) 10/17/2016             Physical Exam:     VITAL SIGNS:  Blood pressure (!) 147/83, pulse 96, temperature 98.2  F (36.8  C), temperature source Oral, weight 70.9 kg (156 lb 3.2 oz), SpO2 97%.     GENERAL APPEARANCE: Not in acute  distress  PHYSICAL EXAM:   Eyes:     No ptosis, no discharge, no scleral icterus  Mouth, Throat:     mucous membranes moist  Respiratory:     Inspection: Not in respiratory distress, Chest expansion symmetrical  Musculoskeletal:     No elbow wrist knee or ankle tenderness, deformity or swelling, no quadriceps calf or upper arm muscular tenderness noted  Skin:     Dry and intact  Neurologic:     Higher Mental Function: Conversant, AOx4   Facial asymmetry grossly absent   Patient is ambulatory   Psychiatric:     Appropriate        Signature:     Anna Dailey MD   Infectious Disease Fellow    Case discussed with the supervising attending ID physician, Dr Austin     This dictation was prepared in part using Dragon recognition software.  As a result errors may occur. When identified these transcription errors have been corrected.  While every attempt is made to correct errors during dictation, errors may still exist

## 2024-02-07 NOTE — Clinical Note
Lenka, can you let me know if dexcom is covered? If so, I can teach him how to use in clinic if he comes next week. Thank you

## 2024-02-07 NOTE — PROGRESS NOTES
Medication Therapy Management (MTM) Encounter    ASSESSMENT:                            Medication Adherence/Access: See below for considerations    HIV:   Encouraged adherence. Repeat viral load pending. Due for vaccines. Will offer next visit.      Type 2 Diabetes:    Very high blood sugar today. Low sodium on bmp related to hyperglycemia. No nausea/vomiting/abdominal pain. Long-standing challenges with adherence which contributes to hyperglycemia. Due to social barriers, has struggled with having good follow-up for med titrations.    He is willing to try to increase adherence. Not a good candidate for a GLP-1 Ra to anticipated challenges tapering up the dose and history of bowl obstructions. To simplify regimen, will plan to continue lantus and increase the dose.  Since he's not currently monitoring blood sugar and has challenges with food insecurity, consistent meal times, and complex regimens, will stop fast acting insulin and start glipizide ER 5 mg once daily which does not need to be timed with a meal. Will need close follow-up for medication titrations.     Will look into Dexcom coverage; Dexcom G6 would allow him to apply sensor to abdomen and include a reader which is needed since he doesn't have a phone. It also appears to be on formulary for program HH.     PLAN:                            Re-start Biktarvy once daily   Increase Lantus to 20 units once daily  Start glipizide ER 5 mg once daily   Re-order blood sugar testing supplies  Start Dexcom G6- if covered, will teach how to use in clinic    Follow-up: 1 week     SUBJECTIVE/OBJECTIVE:                          Andrew Collier is a 58 year old male coming in for a follow-up visit from 1/25/23. Patient saw provider prior to our visit today.      Reason for visit: diabetes/med adherence.    Allergies/ADRs: Reviewed in chart  Past Medical History: Reviewed in chart  Tobacco: He reports that he has been smoking cigarettes. He has a 10 pack-year  smoking history. He has quit using smokeless tobacco.Nicotine/Tobacco Cessation Plan: didn't discuss today  Alcohol: no; has used IV meth in the last year  6 ED visits/hospitalizations in the last 4 months. Frequently leaves AMA.  No working phone  Medication Adherence/Access:   Misses medication doses at least 50% of the time. Has program HH       HIV:   Biktarvy once daily. Has been out of this due to social barriers/lack of follow-up. He reports taking daily until gone.   Side effects:   Diagnosis: 2015  Past regimens: Triumeq, Genvoya, Biktarvy since 2019  Mutations:               NRTI: none              NNRTI: V179D (pan-sensitive)               PI: E35D (pan-sensitive)   HIV VL: 19,000 per outside records since at least 8/2023; repeat pending  CD4: 534 on 2/6/24  Treponema Ab: nonreactive 2/6/24  Immunizations: due for covid, flu, pcv20     Type 2 Diabetes:    Lantus 15 units daily - only takes ~50% of the time  Novolog 3-5 units 3 times daily with meals - doesn't take consistently   Patient has not established with Endo.    Previous trials:   - metformin: refuses to take due to history of side effects   - dulaglutide: rash.   - glimeperide and januvia: non-adherence  - jardiance: may have caused wounds on legs; unclear history    Patient reports no side effects but has signs of hyperglycemia. Significant history of nausea, vomiting, and bowel obstructions but denies symptoms today.  Blood sugar monitoring: not currently. He is not interested in a CGM placed on his arm but may be interested in a sensor that could be placed on his stomach  Current diabetes symptoms: losing fat in arms and fatigue  Diet/Exercise: food insecurity.   Eye exam: due  Foot exam: due  Urine Albumin:   Lab Results   Component Value Date    UMALCR 20.66 (H) 02/11/2020      Lab Results   Component Value Date    A1C 10.7 (H) 11/09/2023     Glucose: 460 on today's BMP    Today's Vitals: There were no vitals taken for this  visit.  ----------------    I spent 20 minutes with this patient today. All changes were made via collaborative practice agreement with Dr. Dailey. A copy of the visit note was provided to the patient's provider(s).    A summary of these recommendations was declined       Medication Therapy Recommendations  Type 2 diabetes mellitus (H)    Current Medication: insulin glargine (LANTUS PEN) 100 UNIT/ML pen   Rationale: Dose too low - Dosage too low - Effectiveness   Recommendation: Increase Dose   Status: Accepted per CPA          Current Medication: insulin glargine (LANTUS PEN) 100 UNIT/ML pen   Rationale: Synergistic therapy - Needs additional medication therapy - Indication   Recommendation: Start Medication - glipiZIDE 5 MG 24 hr tablet   Status: Accepted per CPA          Current Medication: insulin glargine (LANTUS PEN) 100 UNIT/ML pen   Rationale: Medication requires monitoring - Needs additional monitoring   Recommendation: Start Medication - Dexcom G6  Mariella   Status: Accepted per CPA

## 2024-02-08 ENCOUNTER — TELEPHONE (OUTPATIENT)
Dept: INFECTIOUS DISEASES | Facility: CLINIC | Age: 59
End: 2024-02-08
Payer: COMMERCIAL

## 2024-02-08 LAB
HBA1C MFR BLD: 13.4 %
HIV1 RNA # PLAS NAA DL=20: 170 COPIES/ML
HIV1 RNA SERPL NAA+PROBE-LOG#: 2.2 {LOG_COPIES}/ML
N GONORRHOEA DNA SPEC QL NAA+PROBE: NEGATIVE

## 2024-02-08 RX ORDER — PROCHLORPERAZINE 25 MG/1
1 SUPPOSITORY RECTAL
Qty: 3 EACH | Refills: 5 | Status: SHIPPED | OUTPATIENT
Start: 2024-02-08 | End: 2024-07-25

## 2024-02-08 RX ORDER — PROCHLORPERAZINE 25 MG/1
1 SUPPOSITORY RECTAL
Qty: 1 EACH | Refills: 1 | Status: SHIPPED | OUTPATIENT
Start: 2024-02-08 | End: 2024-07-25

## 2024-02-08 RX ORDER — PROCHLORPERAZINE 25 MG/1
1 SUPPOSITORY RECTAL ONCE
Qty: 1 EACH | Refills: 0 | Status: SHIPPED | OUTPATIENT
Start: 2024-02-08 | End: 2024-02-08

## 2024-02-08 NOTE — TELEPHONE ENCOUNTER
"No Answer/ Busy \"Call cannot be completed at this time\" for the patient to call back and schedule the following:    Appointment type: Return  Provider: Ashlie  Return date: 03/2024  Specialty phone number: [phone]  Additional appointment(s) needed: na  Additonal Notes: in person or virtual    "

## 2024-02-12 ENCOUNTER — TELEPHONE (OUTPATIENT)
Dept: INFECTIOUS DISEASES | Facility: CLINIC | Age: 59
End: 2024-02-12
Payer: COMMERCIAL

## 2024-02-12 NOTE — PROGRESS NOTES
[facility] Staff Note: Mr. Kern was seen, examined, and the case was discussed with Dr. Dailey, ID Fellow -- I agree with her interval history and examination, assessment and plan in this outpatient ID / HIV Progress note. This note reflects my observations and opinions and the plan outlined fully reflects my approach. I have reviewed the available history, radiology, laboratory results, and reports with the Fellow.  Due to additional medical issues and social deteminant of health, this appointment required > 45 minutes of in-clinic care and coordination today.

## 2024-02-12 NOTE — TELEPHONE ENCOUNTER
EP unable to LVM 2/12; line doesn't work. To sched a 1 month (around 3/7) follow up with Dr. Dailey per checkout notes from 2/7. --2nd attempt (1st: 2/8 MB).

## 2024-02-21 PROCEDURE — 96360 HYDRATION IV INFUSION INIT: CPT | Performed by: EMERGENCY MEDICINE

## 2024-02-21 PROCEDURE — 99285 EMERGENCY DEPT VISIT HI MDM: CPT | Performed by: EMERGENCY MEDICINE

## 2024-02-21 PROCEDURE — 99284 EMERGENCY DEPT VISIT MOD MDM: CPT | Mod: 25 | Performed by: EMERGENCY MEDICINE

## 2024-02-22 ENCOUNTER — HOSPITAL ENCOUNTER (EMERGENCY)
Facility: CLINIC | Age: 59
Discharge: HOME | End: 2024-02-22
Attending: EMERGENCY MEDICINE | Admitting: EMERGENCY MEDICINE
Payer: COMMERCIAL

## 2024-02-22 VITALS
OXYGEN SATURATION: 100 % | SYSTOLIC BLOOD PRESSURE: 129 MMHG | TEMPERATURE: 98 F | HEART RATE: 115 BPM | DIASTOLIC BLOOD PRESSURE: 82 MMHG | RESPIRATION RATE: 22 BRPM

## 2024-02-22 DIAGNOSIS — R73.9 HYPERGLYCEMIA: ICD-10-CM

## 2024-02-22 LAB
ALBUMIN SERPL BCG-MCNC: 3.9 G/DL (ref 3.5–5.2)
ALBUMIN UR-MCNC: NEGATIVE MG/DL
ALP SERPL-CCNC: 161 U/L (ref 40–150)
ALT SERPL W P-5'-P-CCNC: 21 U/L (ref 0–70)
ANION GAP SERPL CALCULATED.3IONS-SCNC: 11 MMOL/L (ref 7–15)
APPEARANCE UR: CLEAR
AST SERPL W P-5'-P-CCNC: 23 U/L (ref 0–45)
BASOPHILS # BLD AUTO: 0 10E3/UL (ref 0–0.2)
BASOPHILS NFR BLD AUTO: 1 %
BILIRUB SERPL-MCNC: 0.5 MG/DL
BILIRUB UR QL STRIP: NEGATIVE
BUN SERPL-MCNC: 26.1 MG/DL (ref 8–23)
CALCIUM SERPL-MCNC: 8.8 MG/DL (ref 8.8–10.2)
CHLORIDE SERPL-SCNC: 99 MMOL/L (ref 98–107)
COLOR UR AUTO: ABNORMAL
CREAT SERPL-MCNC: 0.66 MG/DL (ref 0.67–1.17)
DEPRECATED HCO3 PLAS-SCNC: 22 MMOL/L (ref 22–29)
EGFRCR SERPLBLD CKD-EPI 2021: >90 ML/MIN/1.73M2
EOSINOPHIL # BLD AUTO: 0.2 10E3/UL (ref 0–0.7)
EOSINOPHIL NFR BLD AUTO: 3 %
ERYTHROCYTE [DISTWIDTH] IN BLOOD BY AUTOMATED COUNT: 13.5 % (ref 10–15)
GLUCOSE BLDC GLUCOMTR-MCNC: 304 MG/DL (ref 70–99)
GLUCOSE BLDC GLUCOMTR-MCNC: 463 MG/DL (ref 70–99)
GLUCOSE BLDC GLUCOMTR-MCNC: 514 MG/DL (ref 70–99)
GLUCOSE SERPL-MCNC: 456 MG/DL (ref 70–99)
GLUCOSE UR STRIP-MCNC: >=1000 MG/DL
HCT VFR BLD AUTO: 38.3 % (ref 40–53)
HGB BLD-MCNC: 13.5 G/DL (ref 13.3–17.7)
HGB UR QL STRIP: NEGATIVE
IMM GRANULOCYTES # BLD: 0.1 10E3/UL
IMM GRANULOCYTES NFR BLD: 1 %
KETONES UR STRIP-MCNC: NEGATIVE MG/DL
LEUKOCYTE ESTERASE UR QL STRIP: NEGATIVE
LYMPHOCYTES # BLD AUTO: 2.4 10E3/UL (ref 0.8–5.3)
LYMPHOCYTES NFR BLD AUTO: 33 %
MCH RBC QN AUTO: 29.5 PG (ref 26.5–33)
MCHC RBC AUTO-ENTMCNC: 35.2 G/DL (ref 31.5–36.5)
MCV RBC AUTO: 84 FL (ref 78–100)
MONOCYTES # BLD AUTO: 0.6 10E3/UL (ref 0–1.3)
MONOCYTES NFR BLD AUTO: 8 %
MUCOUS THREADS #/AREA URNS LPF: PRESENT /LPF
NEUTROPHILS # BLD AUTO: 4 10E3/UL (ref 1.6–8.3)
NEUTROPHILS NFR BLD AUTO: 54 %
NITRATE UR QL: NEGATIVE
NRBC # BLD AUTO: 0 10E3/UL
NRBC BLD AUTO-RTO: 0 /100
PH UR STRIP: 6.5 [PH] (ref 5–7)
PLATELET # BLD AUTO: 329 10E3/UL (ref 150–450)
POTASSIUM SERPL-SCNC: 3.9 MMOL/L (ref 3.4–5.3)
PROT SERPL-MCNC: 6.7 G/DL (ref 6.4–8.3)
RBC # BLD AUTO: 4.58 10E6/UL (ref 4.4–5.9)
RBC URINE: 0 /HPF
SODIUM SERPL-SCNC: 132 MMOL/L (ref 135–145)
SP GR UR STRIP: 1.03 (ref 1–1.03)
UROBILINOGEN UR STRIP-MCNC: NORMAL MG/DL
WBC # BLD AUTO: 7.3 10E3/UL (ref 4–11)
WBC URINE: 0 /HPF

## 2024-02-22 PROCEDURE — 81001 URINALYSIS AUTO W/SCOPE: CPT | Performed by: EMERGENCY MEDICINE

## 2024-02-22 PROCEDURE — 80053 COMPREHEN METABOLIC PANEL: CPT | Performed by: EMERGENCY MEDICINE

## 2024-02-22 PROCEDURE — 36415 COLL VENOUS BLD VENIPUNCTURE: CPT | Performed by: EMERGENCY MEDICINE

## 2024-02-22 PROCEDURE — 82962 GLUCOSE BLOOD TEST: CPT

## 2024-02-22 PROCEDURE — 258N000003 HC RX IP 258 OP 636: Performed by: EMERGENCY MEDICINE

## 2024-02-22 PROCEDURE — 250N000012 HC RX MED GY IP 250 OP 636 PS 637: Performed by: EMERGENCY MEDICINE

## 2024-02-22 PROCEDURE — 85025 COMPLETE CBC W/AUTO DIFF WBC: CPT | Performed by: EMERGENCY MEDICINE

## 2024-02-22 PROCEDURE — 250N000013 HC RX MED GY IP 250 OP 250 PS 637: Performed by: EMERGENCY MEDICINE

## 2024-02-22 RX ORDER — MAGNESIUM HYDROXIDE/ALUMINUM HYDROXICE/SIMETHICONE 120; 1200; 1200 MG/30ML; MG/30ML; MG/30ML
15 SUSPENSION ORAL ONCE
Status: COMPLETED | OUTPATIENT
Start: 2024-02-22 | End: 2024-02-22

## 2024-02-22 RX ADMIN — ALUMINUM HYDROXIDE, MAGNESIUM HYDROXIDE, AND SIMETHICONE 15 ML: 200; 200; 20 SUSPENSION ORAL at 00:42

## 2024-02-22 RX ADMIN — SODIUM CHLORIDE 1000 ML: 9 INJECTION, SOLUTION INTRAVENOUS at 01:00

## 2024-02-22 RX ADMIN — INSULIN ASPART 10 UNITS: 100 INJECTION, SOLUTION INTRAVENOUS; SUBCUTANEOUS at 04:23

## 2024-02-22 ASSESSMENT — ACTIVITIES OF DAILY LIVING (ADL)
ADLS_ACUITY_SCORE: 37

## 2024-02-22 ASSESSMENT — COLUMBIA-SUICIDE SEVERITY RATING SCALE - C-SSRS
2. HAVE YOU ACTUALLY HAD ANY THOUGHTS OF KILLING YOURSELF IN THE PAST MONTH?: NO
6. HAVE YOU EVER DONE ANYTHING, STARTED TO DO ANYTHING, OR PREPARED TO DO ANYTHING TO END YOUR LIFE?: NO
1. IN THE PAST MONTH, HAVE YOU WISHED YOU WERE DEAD OR WISHED YOU COULD GO TO SLEEP AND NOT WAKE UP?: NO

## 2024-02-22 NOTE — ED NOTES
Pt not able to be located in ED lobby or bathrooms.  Pt returned to ED lobby.  Pt stated he went to [facility].  Pt reports removing his PIV, no PIV observed in pt hand at this time.

## 2024-02-22 NOTE — ED TRIAGE NOTES
Triage Assessment (Adult)       Row Name 02/22/24 0005          Triage Assessment    Airway WDL WDL        Respiratory WDL    Respiratory WDL WDL        Cardiac WDL    Cardiac WDL --  tachycardia        Cognitive/Neuro/Behavioral WDL    Cognitive/Neuro/Behavioral WDL WDL

## 2024-02-22 NOTE — ED PROVIDER NOTES
[place] EMERGENCY DEPARTMENT ([facility])    2/22/24       ED PROVIDER NOTE      History     Chief Complaint   Patient presents with    Abdominal Pain     Pt states he thinks he took a drink that had fentanyl in it.  Pt states it was a coke zero, pt did not knowingly take fentanyl because pt states he is allergic to fentanyl, pt denies other drug use including meth.  Pt here for generalized pain which he thinks is an allergic reaction.  Pt denies N/V.  Pt reports diff breathing.       HPI  Andrew Collier is a 58 year old male with a past medical history significant for IV polysubstance use disorder (opioid and amphetamine) with prior accidental overdoses, toxoplasma encephalitis, SBO (requiring admission 11/19 - 11/12/2023), AIDS, and poorly controlled DM2 who presents to the ED for evaluation of abdominal pain.  Patient states that his pain is attributed to taking a few sips out of a Coke Zero that he believes had contained fentanyl.  States that he did not knowingly use fentanyl, because he is allergic to this.  He drank the Coke Zero about 1 hour prior to arrival and developed symptoms immediately afterwards.  Patient is now experiencing facial twitching, generalized body pain, heartburn, some shortness of breath, and feeling as if his blood sugar is high.      Notes that this is the second time he has had an allergic reaction to fentanyl, and that his current symptoms are consistent with his first episode in 2018.  States that he did develop lower extremity wounds with his allergic reaction in the past, but that his symptoms had resolved with IVs.  Patient denies use of alcohol or additional substances, other than smoking tobacco earlier tonight.  States that he does not longer do any drugs after developing AIDS.  He has been taking his HIV/AIDS medications.  No nausea or vomiting.  Denies abdominal pain.  States that his generalized body pain is not similar to pain he has experienced with  prior SBO's.    Past Medical History  Past Medical History:   Diagnosis Date    Acute appendicitis with localized peritonitis 1/31/2018    AIDS (H)     Allergic rhinitis due to other allergen     DNS    Anal dysplasia     Chronic abdominal pain     CNS toxoplasmosis (H)     COVID-19 1/11/2022    Diabetes type 2, controlled (H)     GERD (gastroesophageal reflux disease)     Herpes zoster 9/23/2016    History of seizure     History of substance abuse (H)     HIV (human immunodeficiency virus infection) (H)     HLD (hyperlipidemia)     Lung nodules     Periungual wart     Pneumonia 1/6/2019    PTSD (post-traumatic stress disorder)     Sleep apnea     doesn't use CPAP     Past Surgical History:   Procedure Laterality Date    ANOSCOPY N/A 9/9/2020    Procedure: Exam Under Anesthesia, ANOSCOPY, fulgeration of rectal fissures with Rectal Biopsies;  Surgeon: Thanh Lundberg MD;  Location: [facility]    COLONOSCOPY Left 1/22/2016    Procedure: COMBINED COLONOSCOPY, SINGLE OR MULTIPLE BIOPSY/POLYPECTOMY BY BIOPSY;  Surgeon: Clark Saini MD;  Location: [facility] GI    ESOPHAGOSCOPY, GASTROSCOPY, DUODENOSCOPY (EGD), COMBINED N/A 6/6/2022    Procedure: ESOPHAGOGASTRODUODENOSCOPY, WITH BIOPSY;  Surgeon: Kecia Benítez MD;  Location: [facility] GI    HC EXPLORE UNDESC TESTIS,INGUIN/SCROTAL      LAPAROSCOPIC APPENDECTOMY N/A 1/31/2018    Procedure: LAPAROSCOPIC APPENDECTOMY;  LAPAROSCOPIC APPENDECTOMY;  Surgeon: Dawn Holt MD;  Location: [facility]    LAPAROSCOPY DIAGNOSTIC (GENERAL) N/A 7/26/2016    Procedure: LAPAROSCOPY DIAGNOSTIC (GENERAL);  Surgeon: Susannah Arriaga MD;  Location: [facility]    LAPAROSCOPY DIAGNOSTIC (GENERAL) N/A 4/16/2018    Procedure: LAPAROSCOPY DIAGNOSTIC (GENERAL);  Diagnostic laparoscopy and lysis of adhesions;  Surgeon: Prince Dowling MD;  Location: [facility] OR    OPTICAL TRACKING SYSTEM CRANIOTOMY, EXCISE TUMOR, COMBINED Left 4/10/2015    Procedure: COMBINED OPTICAL TRACKING SYSTEM CRANIOTOMY,  "EXCISE TUMOR;  Surgeon: Mirlande Colmenares MD;  Location: [facility] OR    REPAIR GAMEKEEPER'S THUMB Right 12/2/2016    Procedure: REPAIR LIGAMENT ULNAR COLLATERAL THUMB (GAMEKEEPER'S);  Surgeon: Evin Zamorano MD;  Location:  OR    [facility] NONSPECIFIC PROCEDURE      right forearm fracture     alum & mag hydroxide-simethicone (MAALOX) 200-200-20 MG/5ML SUSP suspension  bictegravir-emtricitabine-tenofovir (BIKTARVY) -25 MG per tablet  blood glucose (NO BRAND SPECIFIED) lancets standard  blood glucose (NO BRAND SPECIFIED) test strip  blood glucose monitoring (NO BRAND SPECIFIED) meter device kit  Continuous Blood Gluc Sensor (DEXCOM G6 SENSOR) MISC  Continuous Blood Gluc Transmit (DEXCOM G6 TRANSMITTER) MISC  famotidine (PEPCID) 40 MG tablet  glipiZIDE (GLUCOTROL XL) 5 MG 24 hr tablet  insulin glargine (LANTUS PEN) 100 UNIT/ML pen  insulin pen needle (32G X 4 MM) 32G X 4 MM miscellaneous  polyethylene glycol (MIRALAX) 17 GM/Dose powder  senna-docusate (SENOKOT-S/PERICOLACE) 8.6-50 MG tablet      Allergies   Allergen Reactions    Fentanyl Blisters     Per pt, after taking this medication had blisters develope    Tylenol [Acetaminophen] Itching    Dulaglutide Rash    Insulin Lispro Rash     Patient reported    Penicillin V Other (See Comments) and Rash     Diffuse maculopapular rash + feels \"high\", per pt.      Family History  Family History   Problem Relation Age of Onset    Diabetes Brother     Diabetes Father     Alzheimer Disease Father     Unknown/Adopted Mother     Diabetes Paternal Grandfather     Cancer No family hx of         no skin cancer    Skin Cancer No family hx of         no famiy hx of skin cancer    Glaucoma No family hx of     Macular Degeneration No family hx of      Social History   Social History     Tobacco Use    Smoking status: Some Days     Packs/day: 0.25     Years: 40.00     Additional pack years: 0.00     Total pack years: 10.00     Types: Cigarettes    Smokeless tobacco: Former "   Substance Use Topics    Alcohol use: No     Alcohol/week: 0.0 standard drinks of alcohol     Comment: Last etoh in 2007    Drug use: Not Currently     Types: Marijuana, Methamphetamines      Past medical history, past surgical history, medications, allergies, family history, and social history were reviewed with the patient. No additional pertinent items.      A medically appropriate review of systems was performed with pertinent positives and negatives noted in the HPI, and all other systems negative.    Physical Exam   BP: 129/82  Pulse: 115  Temp: 98  F (36.7  C)  Resp: 22  SpO2: 100 %  Physical Exam  General: Afebrile, no acute distress   HEENT: Normocephalic, atraumatic, conjunctivae normal. MMM  Neck: non-tender, supple  Cardio: regular rate. regular rhythm   Resp: Normal work of breathing, no respiratory distress, lungs clear bilaterally, no wheezing, rhonchi, rales  Chest/Back: no visual signs of trauma, no CVA tenderness   Abdomen: soft, non distension, no tenderness, no peritoneal signs   Neuro: alert and fully oriented. CN II-XII grossly intact. Grossly normal strength and sensation in all extremities.   MSK: no deformities. Normal range of motion  Integumentary/Skin: no rash visualized, normal color  Psych: normal affect, normal behavior      ED Course, Procedures, & Data    12:28 AM  The patient was seen and examined by Sheila Bruno   in Room VTA.     Procedures      Results for orders placed or performed during the hospital encounter of 02/22/24   Glucose by meter     Status: Abnormal   Result Value Ref Range    GLUCOSE BY METER POCT 463 (H) 70 - 99 mg/dL   Comprehensive metabolic panel     Status: Abnormal   Result Value Ref Range    Sodium 132 (L) 135 - 145 mmol/L    Potassium 3.9 3.4 - 5.3 mmol/L    Carbon Dioxide (CO2) 22 22 - 29 mmol/L    Anion Gap 11 7 - 15 mmol/L    Urea Nitrogen 26.1 (H) 8.0 - 23.0 mg/dL    Creatinine 0.66 (L) 0.67 - 1.17 mg/dL    GFR Estimate >90 >60 mL/min/1.73m2     Calcium 8.8 8.8 - 10.2 mg/dL    Chloride 99 98 - 107 mmol/L    Glucose 456 (H) 70 - 99 mg/dL    Alkaline Phosphatase 161 (H) 40 - 150 U/L    AST 23 0 - 45 U/L    ALT 21 0 - 70 U/L    Protein Total 6.7 6.4 - 8.3 g/dL    Albumin 3.9 3.5 - 5.2 g/dL    Bilirubin Total 0.5 <=1.2 mg/dL   UA with Microscopic reflex to Culture     Status: Abnormal    Specimen: Urine, Clean Catch   Result Value Ref Range    Color Urine Straw Colorless, Straw, Light Yellow, Yellow    Appearance Urine Clear Clear    Glucose Urine >=1000 (A) Negative mg/dL    Bilirubin Urine Negative Negative    Ketones Urine Negative Negative mg/dL    Specific Gravity Urine 1.031 1.003 - 1.035    Blood Urine Negative Negative    pH Urine 6.5 5.0 - 7.0    Protein Albumin Urine Negative Negative mg/dL    Urobilinogen Urine Normal Normal, 2.0 mg/dL    Nitrite Urine Negative Negative    Leukocyte Esterase Urine Negative Negative    Mucus Urine Present (A) None Seen /LPF    RBC Urine 0 <=2 /HPF    WBC Urine 0 <=5 /HPF    Narrative    Urine Culture not indicated   CBC with platelets and differential     Status: Abnormal   Result Value Ref Range    WBC Count 7.3 4.0 - 11.0 10e3/uL    RBC Count 4.58 4.40 - 5.90 10e6/uL    Hemoglobin 13.5 13.3 - 17.7 g/dL    Hematocrit 38.3 (L) 40.0 - 53.0 %    MCV 84 78 - 100 fL    MCH 29.5 26.5 - 33.0 pg    MCHC 35.2 31.5 - 36.5 g/dL    RDW 13.5 10.0 - 15.0 %    Platelet Count 329 150 - 450 10e3/uL    % Neutrophils 54 %    % Lymphocytes 33 %    % Monocytes 8 %    % Eosinophils 3 %    % Basophils 1 %    % Immature Granulocytes 1 %    NRBCs per 100 WBC 0 <1 /100    Absolute Neutrophils 4.0 1.6 - 8.3 10e3/uL    Absolute Lymphocytes 2.4 0.8 - 5.3 10e3/uL    Absolute Monocytes 0.6 0.0 - 1.3 10e3/uL    Absolute Eosinophils 0.2 0.0 - 0.7 10e3/uL    Absolute Basophils 0.0 0.0 - 0.2 10e3/uL    Absolute Immature Granulocytes 0.1 <=0.4 10e3/uL    Absolute NRBCs 0.0 10e3/uL   Glucose by meter     Status: Abnormal   Result Value Ref Range     GLUCOSE BY METER POCT 514 () 70 - 99 mg/dL   CBC with platelets differential     Status: Abnormal    Narrative    The following orders were created for panel order CBC with platelets differential.  Procedure                               Abnormality         Status                     ---------                               -----------         ------                     CBC with platelets and d...[[id number]]  Abnormal            Final result                 Please view results for these tests on the individual orders.     Medications   alum & mag hydroxide-simethicone (MAALOX) suspension 15 mL (15 mLs Oral $Given 2/22/24 0042)   sodium chloride 0.9% BOLUS 1,000 mL (0 mLs Intravenous Stopped 2/22/24 0200)   insulin aspart (NovoLOG) injection (RAPID ACTING) (10 Units Subcutaneous $Given 2/22/24 0423)     Labs Ordered and Resulted from Time of ED Arrival to Time of ED Departure   GLUCOSE BY METER - Abnormal       Result Value    GLUCOSE BY METER POCT 463 (*)    COMPREHENSIVE METABOLIC PANEL - Abnormal    Sodium 132 (*)     Potassium 3.9      Carbon Dioxide (CO2) 22      Anion Gap 11      Urea Nitrogen 26.1 (*)     Creatinine 0.66 (*)     GFR Estimate >90      Calcium 8.8      Chloride 99      Glucose 456 (*)     Alkaline Phosphatase 161 (*)     AST 23      ALT 21      Protein Total 6.7      Albumin 3.9      Bilirubin Total 0.5     ROUTINE UA WITH MICROSCOPIC REFLEX TO CULTURE - Abnormal    Color Urine Straw      Appearance Urine Clear      Glucose Urine >=1000 (*)     Bilirubin Urine Negative      Ketones Urine Negative      Specific Gravity Urine 1.031      Blood Urine Negative      pH Urine 6.5      Protein Albumin Urine Negative      Urobilinogen Urine Normal      Nitrite Urine Negative      Leukocyte Esterase Urine Negative      Mucus Urine Present (*)     RBC Urine 0      WBC Urine 0     CBC WITH PLATELETS AND DIFFERENTIAL - Abnormal    WBC Count 7.3      RBC Count 4.58      Hemoglobin 13.5      Hematocrit 38.3  (*)     MCV 84      MCH 29.5      MCHC 35.2      RDW 13.5      Platelet Count 329      % Neutrophils 54      % Lymphocytes 33      % Monocytes 8      % Eosinophils 3      % Basophils 1      % Immature Granulocytes 1      NRBCs per 100 WBC 0      Absolute Neutrophils 4.0      Absolute Lymphocytes 2.4      Absolute Monocytes 0.6      Absolute Eosinophils 0.2      Absolute Basophils 0.0      Absolute Immature Granulocytes 0.1      Absolute NRBCs 0.0     GLUCOSE BY METER - Abnormal    GLUCOSE BY METER POCT 514 (*)    GLUCOSE MONITOR NURSING POCT     No orders to display          Critical care was not performed.     Medical Decision Making  The patient's presentation was of high complexity (a chronic illness severe exacerbation, progression, or side effect of treatment).    The patient's evaluation involved:  review of external note(s) from 3+ sources (prior ED visits)  review of 3+ test result(s) ordered prior to this encounter (most recent labs CBC, CMP, urinalysis)  ordering and/or review of 3+ test(s) in this encounter (see separate area of note for details)    The patient's management necessitated moderate risk (prescription drug management including medications given in the ED) and high risk (a decision regarding hospitalization).    Assessment & Plan    Andrew Collier is a 58 year old male with a past medical history significant for IV polysubstance use disorder (opioid and amphetamine) with prior accidental overdoses, toxoplasma encephalitis, SBO (requiring admission 11/19 - 11/12/2023), AIDS, and poorly controlled DM2 who presents to the ED for evaluation of all over body pains after possible fentanyl exposure/ingestion.  Upon arrival patient is nontoxic-appearing, afebrile, no distress.  Patient tachycardic with heart rate 115 bpm upon arrival, otherwise hemodynamically stable blood pressure 129/82, oxygen 100% on room air. Initial blood glucose 463.  Patient states that he did not take his medications  insulin or oral medications tonight.  IV was established and comprehensive labs were performed.  Patient was hydrated with 1 L IV fluid bolus    I reviewed comprehensive labs which are remarkable for white blood cell count of 7.3, hemoglobin 13.5, glucose elevated at 456, potassium 3.9, anion gap 11, creatinine 0.66, no transaminitis, urinalysis with glucose, no ketones.  Patient was treated with 1 L IV fluid bolus as well as 10 units of insulin.  I suspect patient's blood glucose is elevated elevated secondary to not taking his medications. Repeat blood glucose level 304.  Patient states he does have close follow-up with endocrinologist and is agreeable to restart his diabetic medications this morning.  Patient feels comfortable discharge home.  No other complaints, no signs of acute infection.  Strict return precautions discussed.  Patient understands and agrees to the plan.      I have reviewed the nursing notes. I have reviewed the findings, diagnosis, plan and need for follow up with the patient.    New Prescriptions    No medications on file       Final diagnoses:   Hyperglycemia   INery, am serving as a trained medical scribe to document services personally performed by Sheila Bruno MD, based on the provider's statements to me.     ISheila MD, was physically present and have reviewed and verified the accuracy of this note documented by Nery Durham.      Sehila Bruno MD  [facility] EMERGENCY DEPARTMENT  2/21/2024     Shelia Bruno MD  02/22/24 [id number]

## 2024-03-05 ENCOUNTER — DOCUMENTATION ONLY (OUTPATIENT)
Dept: INFECTIOUS DISEASES | Facility: CLINIC | Age: 59
End: 2024-03-05
Payer: COMMERCIAL

## 2024-03-05 ENCOUNTER — DOCUMENTATION ONLY (OUTPATIENT)
Dept: UROLOGY | Facility: CLINIC | Age: 59
End: 2024-03-05
Payer: COMMERCIAL

## 2024-03-05 NOTE — PROGRESS NOTES
This writer spoke with pt about his history of no-show appts. It was explained that we will schedule one more time for a new urology appt, but if the pt does not arrive for the appt or cancel at least 24 hours in advance, we will be unable to schedule any further appointments. Pt verbalized understanding and agreed to come to his appt with Preet Simmons PA-C on 4/22/24.     PIPO DE SOUZA RN

## 2024-03-05 NOTE — PROGRESS NOTES
Patient arrived at clinic requesting to speak with ID nurse. Patient said he needed some forms filled out for [facility] stating he has diabetes so he can get EBT benefits. Patient was also recently in the ED for hyperglycemia. Scheduled patient with provider on 3/27 and scheduled patient to see Dorie Holland for an MTM visit tomorrow 03/06 at 1:00pm to discuss diabetes medications and forms needed.

## 2024-03-08 NOTE — TELEPHONE ENCOUNTER
MEDICAL RECORDS REQUEST   [facility]  Urology Clinic  [address] [place] [address]  PHONE: [phone]  Fax: [phone]                                                    FUTURE VISIT INFORMATION                                                   Andrew Collier, : 1965 scheduled for future visit at [facility]     APPOINTMENT INFORMATION:  Date: 2024  Provider:  Chevy Cohen MD  Reason for Visit/Diagnosis: consult for implant     RECORDS REQUESTED FOR VISIT                                                      NOTES   STATUS/DETAILS   DISCHARGE REPORT from the ER   yes, 2023 -- [facility] ED   MEDICATION LIST   yes   LABS       URINALYSIS (UA)   yes   IMAGES   yes, 2023, 2023, 2023 -- CT ABD PELVIS     2023 -- US TESTICULAR AND SCROTUM      PRE-VISIT CHECKLIST        Joint diagnostic appointment coordinated correctly          (ensure right order & amount of time) Yes   RECORD COLLECTION COMPLETE Yes

## 2024-03-16 ENCOUNTER — HOSPITAL ENCOUNTER (EMERGENCY)
Facility: CLINIC | Age: 59
Discharge: LEFT BEFORE DISCHARGE | End: 2024-03-16
Attending: EMERGENCY MEDICINE | Admitting: EMERGENCY MEDICINE
Payer: COMMERCIAL

## 2024-03-16 VITALS
HEIGHT: 64 IN | OXYGEN SATURATION: 99 % | DIASTOLIC BLOOD PRESSURE: 93 MMHG | WEIGHT: 155 LBS | RESPIRATION RATE: 16 BRPM | SYSTOLIC BLOOD PRESSURE: 163 MMHG | HEART RATE: 115 BPM | BODY MASS INDEX: 26.46 KG/M2 | TEMPERATURE: 98 F

## 2024-03-16 DIAGNOSIS — Z53.21 PATIENT LEFT WITHOUT BEING SEEN: ICD-10-CM

## 2024-03-16 PROCEDURE — 99281 EMR DPT VST MAYX REQ PHY/QHP: CPT | Performed by: EMERGENCY MEDICINE

## 2024-03-16 ASSESSMENT — COLUMBIA-SUICIDE SEVERITY RATING SCALE - C-SSRS
1. IN THE PAST MONTH, HAVE YOU WISHED YOU WERE DEAD OR WISHED YOU COULD GO TO SLEEP AND NOT WAKE UP?: NO
2. HAVE YOU ACTUALLY HAD ANY THOUGHTS OF KILLING YOURSELF IN THE PAST MONTH?: NO
6. HAVE YOU EVER DONE ANYTHING, STARTED TO DO ANYTHING, OR PREPARED TO DO ANYTHING TO END YOUR LIFE?: NO

## 2024-03-16 ASSESSMENT — ACTIVITIES OF DAILY LIVING (ADL): ADLS_ACUITY_SCORE: 35

## 2024-03-16 NOTE — ED TRIAGE NOTES
"  Triage Assessment & Note:    Pulse 115   Temp 98  F (36.7  C) (Oral)   Resp 16   Ht 1.626 m (5' 4\")   Wt 70.3 kg (155 lb)   SpO2 99%   BMI 26.61 kg/m      Patient presents with: PT c/o abd pain x 3 hours with heartburn. PT denies N/V/D.     Home Treatments/Remedies: None    Febrile / Afebrile? Afebrile     Duration of C/o:  3 hours    Mick Barrientos RN  March 16, 2024     Triage Assessment (Adult)       Row Name 03/16/24 1604          Triage Assessment    Airway WDL WDL        Respiratory WDL    Respiratory WDL WDL        Cardiac WDL    Cardiac WDL WDL                     "
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
## 2024-04-05 NOTE — Clinical Note
MEDICARE WELLNESS VISIT NOTE    HISTORY OF PRESENT ILLNESS:   Oscar presents for his Subsequent Annual Medicare Wellness Visit.   He has complaints or concerns which include follow up as below.      Patient Care Team:  Fallon Nicholas DO as PCP - General (Med/Peds)        Patient Active Problem List   Diagnosis    Obesity (BMI 30-39.9)    cardiac cath 10-25-16    Cardiomyopathy (CMD)         Past Medical History:   Diagnosis Date    Abnormal LFTs 2001    normal abdominal US    Abnormal stress test 10/2016    Echocardiogram abnormal     LVEF 25%    LBBB (left bundle branch block)     Obesity (BMI 30-39.9) 09/07/2016    Tubular adenoma of colon 12/21/2016    3 yr recall, Dr. Patel         Past Surgical History:   Procedure Laterality Date    Cardiac catherization  10/25/2016    normal cors/ EF: 45% ([facility] [place] [facility] [place]. Wilkinson)    Colonoscopy w biopsy  12/21/2016    Three tubular adenoma polyps, Diverticulosis - next exam due in 3 years - Dr Patel    Knee surgery  > 20 years ago    left         Social History     Tobacco Use    Smoking status: Former     Current packs/day: 0.00     Average packs/day: 0.5 packs/day for 6.0 years (3.0 ttl pk-yrs)     Types: Cigarettes     Start date: 11/1/2002     Quit date: 11/1/2008     Years since quitting: 15.4    Smokeless tobacco: Never   Vaping Use    Vaping Use: never used   Substance Use Topics    Alcohol use: Yes     Alcohol/week: 2.0 standard drinks of alcohol     Types: 1 Glasses of wine, 1 Shots of liquor per week     Comment: 1 drink a couple of times a month/socially    Drug use: No     Drug use:    Drug Use:    No              Family History   Problem Relation Age of Onset    Coronary Artery Disease Mother         ppm    Coronary Artery Disease Father         bypass       Current Outpatient Medications   Medication Sig Dispense Refill    carvedilol (COREG) 25 MG tablet Take 1 tablet by mouth in the morning and 1 tablet in the evening. Take with meals. 180 tablet 3     Hey guys, we gave it a more than valiant effort, but were unable to have an appointment, so I did this primarily by chart review.     I think he needs neuropsych testing after reaching 6 months of sobriety (almost there!)  Also, he would most likely really benefit from an anti adrenergic for trauma symptoms and attentional issues.   SSRI is strongly recommended for PTSD    If he does really want to go the stimulant route, he should at least go the neuropsych route first, and I would probably recommend deferring this to an addiction specialist given his recent meth use history.     Let me know if any questions! lisinopril (ZESTRIL) 5 MG tablet Take 1 tablet by mouth in the morning and 1 tablet in the evening. 180 tablet 3    pravastatin (PRAVACHOL) 20 MG tablet Take 1 tablet by mouth at bedtime. 90 tablet 3     No current facility-administered medications for this visit.        The following items on the Medicare Health Risk Assessment were found to be positive  6 a.) How many servings of Fruits and Vegetables do you have each day ( 1 serving = 1 piece of fruit, 1/2 cup fruits or vegetables): 1 per day     6 b.) How many servings of High Fiber / Whole Grain Foods to you have each day ( 1 serving = 1 cup cold cereal, 1/2 cup cooked cereal, 1 slice bread): 1 per day     15.) How confident are you that you can control and manage most of your health problems?: Somewhat confident         Vision and Hearing screens: Both screenings were performed and reviewed    Advance care planning documents on file - no     Cognitive/Functional Status: no evidence of cognitive dysfunction by direct observation    Opioid Review: Lux is not taking opioid medications.    Recent PHQ 2/9 Score:    PHQ 2:  PHQ 2 Score Adult PHQ 2 Score Adult PHQ 2 Interpretation Little interest or pleasure in activity?   3/29/2024  11:15 AM 0 No further screening needed 0       PHQ 9:  PHQ 9 Score Adult PHQ 9 Score   12/28/2020   2:37 PM 0       DEPRESSION ASSESSMENT/PLAN:  Depression screening is negative no further plan needed.     Body mass index is 34.09 kg/m².    BMI FOLLOW-UP/PLAN:  Patient is obese.    Caloric restriction       Needed Screening/Treatment:   Cardiovascular screening - Lipids , Diabetes screening , Colorectal cancer screening , and Prostate Cancer Screening  Needed follow up:  None    See orders.   See Patient Instructions section.   Return in about 1 year (around 4/5/2025) for Medicare Wellness Visit.           OUTPATIENT PROGRESS NOTE    Subjective   Chief Complaint Office Visit and Medicare Wellness Visit (Subsequent)    HPI Patient is  following up as below    Medications  Medications were reviewed and updated today.    Histories  I have personally reviewed and updated the patient's past medical, past surgical, family and social histories during today's visit.    Review of Systems: The patient DENIES symptoms of:  GENERAL fever, chills, night sweats, fatigue, weight loss, weight gain and elevated glucose before, BMI over 34  SKIN pigmentation or loss of pigmentation, rash, dry skin, scaling, itching, bruising, lumps or bumps, hair changes and nail changes  EYES visual blurring, double vision, spots before the eyes, eye pain, discharge from the eyes, itching in the eyes and color blindness  EARS decreased auditory acuity, ringing or tinnitus in the ears, pain in the ears, discharge from the ears and vertigo  NOSE nose bleed, discharge from the nose, decrease in ability to smell, obstruction of the nose, itching, sneezing, rhinorrhea, post nasal drainage and sinus pressure/pain  MOUTH soreness of the mouth or tongue, lumps or bumps in the mouth, abnormality of the teeth or gums, hoarse voice, snorring and pharyngitis  NECK swelling or masses in the neck, stiffness in the neck, limited range of motion in the neck, goiter, pain in the neck, dysphagia, odynophagia, sensation of food sticking and reflux  CARDIORESP chest pain, palpitations, fast heart rate, edema, cough, hemoptysis, wheeze, shortness of breath, sputum, PND, orthopnea, cyanosis, claudication and Cardiomyopathy, s/p cardia cath, LBBB, Hyperlipidemia all stable -- sees Cardiology  GI abdominal pain, nausea, vomiting, constipation, diarrhea, black tarry stools, blood in the stools, dysphagia, odynophagia, sour burps, sour brash, water brash, hematemesis, change in bowel habits, flatulence, jaundice and hemorrhoids  VASCULAR claudication with walking, claudication at rest, discoloration of the feet, change in the nails and blanching of the fingers with cold exposure   MALE urgency,  frequency, dysuria, hematuria, nocturia, increased post void dribbling, decreased or altered urinary stream and difficulty initiating stream  NEURO headache, weakness, numbness, loss of sensation, visual disturbance, taste disturbance, speech disturbance, trouble with balance or coordination, loss of memory, confusion and tremor  PSYCH symptoms of depression, feeling sad or blue, hopelessness, helplessness, guilt, hypersomnia, insomnia, anxiety and lack of motivation  MUSCULOSKEL joint stiffness, joint pain, joint swelling, muscle pain, blanching of the fingers with cold exposure, stiff neck, tension headache, back pain, shoulder pain and radiating symptoms    Objective   Visit Vitals  /74 (BP Location: RUE - Right upper extremity, Patient Position: Sitting)   Pulse 83   Wt 101.7 kg (224 lb 3.3 oz)   SpO2 96%   BMI 34.09 kg/m²     Physical Exam  Visit Vitals  /74 (BP Location: RUE - Right upper extremity, Patient Position: Sitting)   Pulse 83   Wt 101.7 kg (224 lb 3.3 oz)   SpO2 96%   BMI 34.09 kg/m²     General appearance: alert and no distress  Head: Normocephalic, without obvious abnormality, atraumatic  Eyes: Conjunctivae/sclerae normal. No erythema, edema or exudate.  Ears: normal tympanic membranes and external ear canals both ears  Nose: Nares normal. Septum midline. Mucosa normal. No drainage or sinus tenderness.  Throat: lips, mucosa, and tongue normal; teeth and gums normal  Neck: no adenopathy, no carotid bruit, no JVD, supple, symmetrical, trachea midline, and thyroid not enlarged, symmetric, no tenderness/mass/nodules  Back: Back symmetric, no curvature. Range of motion normal. No costovertebral angle tenderness.  Lungs: clear to auscultation bilaterally  Chest wall: no tenderness  Heart: regular rate and rhythm, S1, S2 normal, no murmur, click, rub or gallop  Abdomen: Soft, non-tender; bowel sounds normal; no masses, no hepatosplenomegaly  Extremities: extremities normal, atraumatic, no  cyanosis or edema  Pulses: 2+ and symmetric  Skin: Skin color, texture, turgor normal. No rashes or lesions  Lymph nodes:  no cervical LAD  Neurologic: Alert and oriented x3, normal strength and tone. Normal symmetric reflexes. Normal coordination and gait    Laboratory  I have reviewed the pertinent laboratory tests. These are the pertinent findings:  NA    Imaging  I have reviewed the pertinent imaging study reports. These are the pertinent findings:  NA    Assessment & Plan   Diagnoses and associated orders for this visit:  1. Medicare annual wellness visit, subsequent  -      - Subsequent Medicare Wellness Visit - Select if billing add'l E/M  2. Pure hypercholesterolemia  -     Lipid Panel With Reflex  -     Comprehensive Metabolic Panel  -     Thyroid Stimulating Hormone Reflex  -      - Subsequent Medicare Wellness Visit - Select if billing add'l E/M  -stable, CCM    3. Cardiomyopathy, unspecified type (CMD)  -      - Subsequent Medicare Wellness Visit - Select if billing add'l E/M  -stable, CCM    4. cardiac cath 10-25-16  -      - Subsequent Medicare Wellness Visit - Select if billing add'l E/M  -stable, CCM    5. LBBB (left bundle branch block)  -      - Subsequent Medicare Wellness Visit - Select if billing add'l E/M  -stable, CCM    6. Elevated glucose  -     Glycohemoglobin  -     Comprehensive Metabolic Panel  -      - Subsequent Medicare Wellness Visit - Select if billing add'l E/M  -stable, CCM    7. Screening for prostate cancer  -     Prostate Specific Antigen, Total With Reflex to Free PSA  -      - Subsequent Medicare Wellness Visit - Select if billing add'l E/M  8. Screening for deficiency anemia  -     CBC with Automated Differential  -      - Subsequent Medicare Wellness Visit - Select if billing add'l E/M  9. Screening for colorectal cancer  -     OPEN ACCESS COLONOSCOPY  -      - Subsequent Medicare Wellness Visit - Select if billing add'l E/M  10.  Obesity (BMI 30-39.9)  -      - Subsequent Medicare Wellness Visit - Select if billing add'l E/M  -weight loss discussed

## 2024-04-10 ENCOUNTER — DOCUMENTATION ONLY (OUTPATIENT)
Dept: INFECTIOUS DISEASES | Facility: CLINIC | Age: 59
End: 2024-04-10
Payer: COMMERCIAL

## 2024-04-10 ENCOUNTER — PATIENT OUTREACH (OUTPATIENT)
Dept: INFECTIOUS DISEASES | Facility: CLINIC | Age: 59
End: 2024-04-10
Payer: COMMERCIAL

## 2024-04-10 ENCOUNTER — OFFICE VISIT (OUTPATIENT)
Dept: PHARMACY | Facility: CLINIC | Age: 59
End: 2024-04-10
Payer: COMMERCIAL

## 2024-04-10 DIAGNOSIS — Z21 HUMAN IMMUNODEFICIENCY VIRUS I INFECTION (H): ICD-10-CM

## 2024-04-10 DIAGNOSIS — K21.9 GASTROESOPHAGEAL REFLUX DISEASE, UNSPECIFIED WHETHER ESOPHAGITIS PRESENT: ICD-10-CM

## 2024-04-10 DIAGNOSIS — E11.65 TYPE 2 DIABETES MELLITUS WITH HYPERGLYCEMIA, WITH LONG-TERM CURRENT USE OF INSULIN (H): Primary | ICD-10-CM

## 2024-04-10 DIAGNOSIS — Z79.4 TYPE 2 DIABETES MELLITUS WITH HYPERGLYCEMIA, WITH LONG-TERM CURRENT USE OF INSULIN (H): Primary | ICD-10-CM

## 2024-04-10 PROCEDURE — 99606 MTMS BY PHARM EST 15 MIN: CPT | Performed by: PHARMACIST

## 2024-04-10 PROCEDURE — 99607 MTMS BY PHARM ADDL 15 MIN: CPT | Performed by: PHARMACIST

## 2024-04-10 RX ORDER — FAMOTIDINE 40 MG/1
40 TABLET, FILM COATED ORAL DAILY
Qty: 30 TABLET | Refills: 2 | Status: SHIPPED | OUTPATIENT
Start: 2024-04-10 | End: 2024-07-26

## 2024-04-10 RX ORDER — GLIPIZIDE 10 MG/1
10 TABLET, FILM COATED, EXTENDED RELEASE ORAL DAILY
Qty: 90 TABLET | Refills: 1 | Status: SHIPPED | OUTPATIENT
Start: 2024-04-10 | End: 2024-07-26

## 2024-04-10 NOTE — TELEPHONE ENCOUNTER
"Social Work - Intervention  [facility]  Data/Intervention: 4/10/2024    Patient Name: Andrew Collier Goes By: Andrwe    /Age: 1965 (58 year old)     Visit Type: In Person  Referral Source: Dorie Holland  Reason for Referral: Resources    Collaborated With:    -Patient  -RAYMOND Mccormick Pharmacist  -Camilla Beaver RN      Psychosocial Information/Concerns:  Patient walked into clinic requesting paperwork. Patient was able to meet with RAYMOND Mccormick Pharmacist and met with Writer after that appointment.     Writer and Patient spoke about various topics including housing, food security, and dental needs. Patient moved out of [facility] and reports having his own apartment. Patient stated he is no longer working with a . Patient denied needing assistance with food resources  or anything other than dental at this time.      Intervention/Education/Resources Provided:  Writer assisted Patient with attempting to schedule an appointment with [facility]. Patient stated he wanted implants, denying any other service at this time. [facility] stated that he would have to see another clinic they work with that does that service. Writer and Patient contacted that clinic and was advised that service would not accept his insurance. Patient was insistent that implants are covered, noting \"[facility] pays for everything\". Writer attempted to explain that Program  covers basic dental only. Patient stated he would look into this himself. Writer suggested he start with seeing a dentist his insurance would cover first and go from there. Patient was agreeable to go to walk in clinic at [facility]. Writer provided the information in written form, including address, phone number, and walk in days/times. Patient was very appreciative of the information.      Assessment/Plan:  Patient has a follow up appointment on  with ID Clinic. Patient stated he would do a walk in " appointment at [facility] soon.      Provided Patient with contact information and availability.    RADHA Rutledge, [facility]  Infectious Disease

## 2024-04-10 NOTE — Clinical Note
Jaret Velez Alp - patient has had challenges engaging in care. I refilled a few meds through his infectious disease provider, Dr. Dailey, but feel free to take with prescribing once he establishes care.  Dorie Holland, PharmD, [facility] Medication Therapy Management Pharmacist

## 2024-04-10 NOTE — PROGRESS NOTES
ID Clinic Walk-In Visit Note    Provider: Dr. Dailey    Date of last visit: 02/07/24    Reason for visit: Forms + diabetes management    Date patient is due for labs & appointment: 05/01/24    Follow up items needed for patient:   - Patient arrived to clinic asking to speak with RAYMOND Mccormick as well as to ask about EBT forms and discuss appointments.   - Printed out patient's upcoming appointments as he does not have a phone. Identified with patient that he had no-showed his 3/27 office visit. Patient requested being scheduled again; scheduled patient for 05/01 with Dr. Dailey and printed off appointment for him.   - Advised patient to discuss the forms he needs with his PCP tomorrow at his appointment. Wrote on patient's appointment reminder the forms he should bring up with PCP.  - Corrected patient's address in chart.     No questions. Patient meeting with Dorie Holland.

## 2024-04-10 NOTE — PATIENT INSTRUCTIONS
"Recommendations from today's MTM visit:                                                      Start using Dexcom G6 to monitor blood sugar   Increase lantus to 30 units once daily - okay to take at night   Increase glipizide ER to 10 mg every morning   Refill famotidine  Ordered alcohol swabs, Ensure, and placed nutrition referral     Follow-up: 3 weeks    It was great speaking with you today.  I value your experience and would be very thankful for your time in providing feedback in our clinic survey. In the next few days, you may receive an email or text message from [employer] with a link to a survey related to your  clinical pharmacist.\"     To schedule another MTM appointment, please call the clinic directly or you may call the MTM scheduling line at [phone] or toll-free at [phone].     My Clinical Pharmacist's contact information:                                                      Please feel free to contact me with any questions or concerns you have.      Dorie Holland, PharmD, AAHIVP  Medication Therapy Management Pharmacist      "

## 2024-04-10 NOTE — PROGRESS NOTES
"Medication Therapy Management (MTM) Encounter    ASSESSMENT:                            Medication Adherence/Access: will send updated refills so he can  today.    HIV:   Last HIV RNA <200 copies. Encouraged adherence to Biktarvy. Recheck labs at next infectious disease follow-up.    Diabetes   Would benefit from starting CGM monitoring. Patient picked up from pharmacy then taught him how to use it in clinic. It's currently in a warm-up period so unable to view readings during the visit. Will increase lantus and glipizide doses today due to historically very high blood sugar and low risk for causing hypoglycemia at these doses.     Weight has been stable and BMI is in the \"overweight\" category. Program HH may cover Ensure if he is working with a dietician. Will refer to a dietician for nutrition evaluation and order Ensure per patient preference and history of food insecurity.     He may have some dental benefits through insurance and program HH.    GERD:   Refill famotidine     PLAN:                            Start using Dexcom G6 to monitor blood sugar   Increase lantus to 30 units once daily - okay to take at night   Increase glipizide ER to 10 mg every morning   Refill famotidine  Ordered alcohol swabs, Ensure, and placed nutrition referral   Will ask  to assist with linking to dental care     Follow-up: tomorrow to check blood sugar and then 3 weeks     SUBJECTIVE/OBJECTIVE:                          Andrew Collier is a 58 year old male coming in for a follow-up visit. He met with RN team before our visit and is meeting with  after our visit.     Reason for visit: diabetes follow-up.    Allergies/ADRs: Reviewed in chart  Past Medical History: Reviewed in chart  Tobacco: He reports that he has been smoking cigarettes. He has a 10 pack-year smoking history. He has quit using smokeless tobacco.Nicotine/Tobacco Cessation Plan  Didn't discuss today  Reports being sober from " "drugs the last 2 months   Alcohol: no    Medication Adherence/Access:   He has program HH   Only has Biktarvy right now. Needs refill of his other meds.     HIV:   Biktarvy once daily   Side effects: none  Diagnosis: 2015  Past regimens: Triumeq, Genvoya, Biktarvy since 2019  Mutations:               NRTI: none              NNRTI: V179D (pan-sensitive)               PI: E35D (pan-sensitive)   HIV VL: 170 on 2/6/24  CD4: 534 on 2/6/24  Treponema Ab: nonreactive 2/6/24  Immunizations: due for covid, flu, pcv20  Immunization History   Administered Date(s) Administered    COVID-19 MONOVALENT 12+ (Pfizer) 06/17/2021, 07/29/2021, 02/10/2022    HepB 03/10/2016, 06/16/2016    Hepatitis B, Adult 08/31/2017    Influenza (IIV3) PF 10/17/2016    Influenza Vaccine 18-64 (Flublok) 01/17/2020, 09/28/2020    Influenza Vaccine >6 months,quad, PF 12/22/2015, 12/14/2017    Mantoux Tuberculin Skin Test 04/12/2015    Meningococcal ACWY (Menactra ) 08/31/2015, 03/10/2016    Pneumo Conj 13-V (2010&after) 03/10/2016    Pneumococcal 23 valent 06/16/2016    TDAP (Adacel,Boostrix) 06/18/2014    TDAP Vaccine (Boostrix) 10/17/2016         Diabetes   Lantus 25 units daily in the morning - he'd like to start taking at night. He's out of this.    Glipizide ER 5 mg once daily in the morning      Main concerns today are dental issues (has a loose tooth) and weight loss on arms and thighs. He states \"I only weight like 100 lbs\". Significant history of nausea, vomiting, and bowel obstructions but denies symptoms today.    Previous trials:   - metformin: refuses to take due to history of side effects   - dulaglutide: rash.   - glimeperide and januvia: non-adherence  - jardiance: may have caused wounds on legs; unclear history    Blood sugar monitoring: interested in CGM   Current diabetes symptoms: losing fat in arms and fatigue  Diet/Exercise: food insecurity.      Eye exam in the last 12 months? No    Foot exam: due  Urine Albumin:   Lab Results " "  Component Value Date    UMALCR 20.66 (H) 02/11/2020      Lab Results   Component Value Date    A1C 13.4 (H) 02/06/2024     Component      Latest Ref Rng 11/12/2023  11:54 AM 2/5/2024  12:17 PM 2/22/2024  12:44 AM 2/22/2024  3:11 AM 2/22/2024  5:32 AM   GLUCOSE BY METER POCT      70 - 99 mg/dL 268 (H)  341 (H)  463 (H)  514 (HH)  304 (H)         GERD:   No current medications    States famotidine was helpful in the past     Wt Readings from Last 2 Encounters:   03/16/24 155 lb (70.3 kg)   02/07/24 156 lb 3.2 oz (70.9 kg)     Estimated body mass index is 26.61 kg/m  as calculated from the following:    Height as of 3/16/24: 5' 4\" (1.626 m).    Weight as of 3/16/24: 155 lb (70.3 kg).    Today's Vitals: There were no vitals taken for this visit.  ----------------    I spent 30 minutes with this patient today. All changes were made via collaborative practice agreement with Dr. Dailey. A copy of the visit note was provided to the patient's provider(s).    A summary of these recommendations was given to the patient.       Medication Therapy Recommendations  Type 2 diabetes mellitus (H)    Current Medication: Continuous Blood Gluc Sensor (DEXCOM G6 SENSOR) MISC   Rationale: Does not understand instructions - Adherence - Adherence   Recommendation: Provide Education   Status: Patient Agreed - Adherence/Education          Current Medication: glipiZIDE (GLUCOTROL XL) 5 MG 24 hr tablet (Discontinued)   Rationale: Dose too low - Dosage too low - Effectiveness   Recommendation: Increase Dose - glipiZIDE 5 MG 24 hr tablet   Status: Accepted per CPA          Current Medication: insulin glargine (LANTUS PEN) 100 UNIT/ML pen   Rationale: Dose too low - Dosage too low - Effectiveness   Recommendation: Increase Dose   Status: Accepted per CPA              " none

## 2024-04-16 ENCOUNTER — PRE VISIT (OUTPATIENT)
Dept: UROLOGY | Facility: CLINIC | Age: 59
End: 2024-04-16
Payer: COMMERCIAL

## 2024-04-16 NOTE — TELEPHONE ENCOUNTER
Reason for visit: Consult for implant     Relevant information: **if pt no shows tell Ceci for termination of care**, Scrotal pain    Records/imaging/labs/orders: all records available    Pt called: no need for a call    At Rooming: have pt empty bladder/pvr,  Standard rooming      Frank Adame  4/16/2024  4:08 PM

## 2024-04-20 ENCOUNTER — HOSPITAL ENCOUNTER (EMERGENCY)
Facility: CLINIC | Age: 59
Discharge: HOME | End: 2024-04-20
Attending: EMERGENCY MEDICINE | Admitting: EMERGENCY MEDICINE
Payer: COMMERCIAL

## 2024-04-20 VITALS
OXYGEN SATURATION: 100 % | HEART RATE: 85 BPM | TEMPERATURE: 97.6 F | DIASTOLIC BLOOD PRESSURE: 86 MMHG | SYSTOLIC BLOOD PRESSURE: 132 MMHG | BODY MASS INDEX: 26.61 KG/M2 | HEIGHT: 64 IN | RESPIRATION RATE: 18 BRPM

## 2024-04-20 DIAGNOSIS — R10.84 GENERALIZED ABDOMINAL PAIN: ICD-10-CM

## 2024-04-20 DIAGNOSIS — R19.7 DIARRHEA, UNSPECIFIED TYPE: ICD-10-CM

## 2024-04-20 PROCEDURE — 99281 EMR DPT VST MAYX REQ PHY/QHP: CPT | Performed by: EMERGENCY MEDICINE

## 2024-04-20 PROCEDURE — 99284 EMERGENCY DEPT VISIT MOD MDM: CPT | Performed by: EMERGENCY MEDICINE

## 2024-04-20 ASSESSMENT — ACTIVITIES OF DAILY LIVING (ADL)
ADLS_ACUITY_SCORE: 37
ADLS_ACUITY_SCORE: 35

## 2024-04-20 NOTE — ED PROVIDER NOTES
ED Provider Note  [facility]      History     Chief Complaint   Patient presents with    Abdominal Pain    Diarrhea     HPI  Andrew Collier is a 58 year old male with past medical history of recurrent SBO/slow transit constipation, methamphetamine use, homelessness, DM2, HIV presents emergency department for chief complaint of abdominal cramping and diarrhea that began yesterday.  He states that he ate vegetables in the morning.  Then he had to twist words after that he felt the abdominal cramping.  He has had multiple episodes of watery explosive diarrhea, 5 episodes just today.  He has not taken any medication for this.  He had some nausea yesterday which has resolved.    He denies pain elsewhere.  He denies any fevers, chills, vomiting.  Denies any bloody/mucous bowel movements.  Denies any drug or alcohol use.  He has a glucometer, and takes his diabetes medication regularly.  Glucometer typically reads around 300.    He last had a infectious disease appointment in February.  He states he has been taking Biktarvy for the past 3 years.  Last CD4 greater than 500.  Denies any antibiotic use, sick contacts, travel/drinking unfiltered water.      HIV HISTORY:  Date of Diagnosis: 2015   Approximated time of transmission:   CD4 Josue: 24/2%  Viral Load at Diagnosis: 171,654  Opportunistic Infections: CNS toxoplasmosis   CMV Status: seropositive   Toxo Status: seropositive , h/o CNS toxoplasmosis s/p Clindamycin and Pyrimethamine then transitioned to Bactrim ppx now off of ppx   Tuberculosis Screenin negative   Historical use of ARVs: 2015 started Triumeq, 2015 switched to Genvoya, 2019 switched to Biktarvy   Historical Resistance Mutations: not in system 2018 genotype cancelled given undetectable     Past Medical History  Past Medical History:   Diagnosis Date    Acute appendicitis with localized peritonitis 2018    AIDS (H)     Allergic rhinitis due to  other allergen     DNS    Anal dysplasia     Chronic abdominal pain     CNS toxoplasmosis (H)     COVID-19 1/11/2022    Diabetes type 2, controlled (H)     GERD (gastroesophageal reflux disease)     Herpes zoster 9/23/2016    History of seizure     History of substance abuse (H)     HIV (human immunodeficiency virus infection) (H)     HLD (hyperlipidemia)     Lung nodules     Periungual wart     Pneumonia 1/6/2019    PTSD (post-traumatic stress disorder)     Sleep apnea     doesn't use CPAP     Past Surgical History:   Procedure Laterality Date    ANOSCOPY N/A 9/9/2020    Procedure: Exam Under Anesthesia, ANOSCOPY, fulgeration of rectal fissures with Rectal Biopsies;  Surgeon: Thanh Lundberg MD;  Location: [facility] OR    COLONOSCOPY Left 1/22/2016    Procedure: COMBINED COLONOSCOPY, SINGLE OR MULTIPLE BIOPSY/POLYPECTOMY BY BIOPSY;  Surgeon: Clark Saini MD;  Location: [facility] GI    ESOPHAGOSCOPY, GASTROSCOPY, DUODENOSCOPY (EGD), COMBINED N/A 6/6/2022    Procedure: ESOPHAGOGASTRODUODENOSCOPY, WITH BIOPSY;  Surgeon: Kecia Benítez MD;  Location: [facility] GI    HC EXPLORE UNDESC TESTIS,INGUIN/SCROTAL      LAPAROSCOPIC APPENDECTOMY N/A 1/31/2018    Procedure: LAPAROSCOPIC APPENDECTOMY;  LAPAROSCOPIC APPENDECTOMY;  Surgeon: Dawn Holt MD;  Location: [facility] OR    LAPAROSCOPY DIAGNOSTIC (GENERAL) N/A 7/26/2016    Procedure: LAPAROSCOPY DIAGNOSTIC (GENERAL);  Surgeon: Susannah Arriaga MD;  Location: [facility]    LAPAROSCOPY DIAGNOSTIC (GENERAL) N/A 4/16/2018    Procedure: LAPAROSCOPY DIAGNOSTIC (GENERAL);  Diagnostic laparoscopy and lysis of adhesions;  Surgeon: Prince Dowling MD;  Location: [facility] OR    OPTICAL TRACKING SYSTEM CRANIOTOMY, EXCISE TUMOR, COMBINED Left 4/10/2015    Procedure: COMBINED OPTICAL TRACKING SYSTEM CRANIOTOMY, EXCISE TUMOR;  Surgeon: Mirlande Colmenares MD;  Location: [facility] OR    REPAIR GAMEKEEPER'S THUMB Right 12/2/2016    Procedure: REPAIR LIGAMENT ULNAR COLLATERAL THUMB (GAMEKEEPER'S);   "Surgeon: Evin Zamorano MD;  Location:  OR    [facility] NONSPECIFIC PROCEDURE      right forearm fracture     alum & mag hydroxide-simethicone (MAALOX) 200-200-20 MG/5ML SUSP suspension  bictegravir-emtricitabine-tenofovir (BIKTARVY) -25 MG per tablet  blood glucose (NO BRAND SPECIFIED) lancets standard  blood glucose (NO BRAND SPECIFIED) test strip  blood glucose monitoring (NO BRAND SPECIFIED) meter device kit  Continuous Blood Gluc Sensor (DEXCOM G6 SENSOR) MISC  Continuous Blood Gluc Transmit (DEXCOM G6 TRANSMITTER) MISC  famotidine (PEPCID) 40 MG tablet  glipiZIDE (GLUCOTROL XL) 10 MG 24 hr tablet  insulin glargine (LANTUS PEN) 100 UNIT/ML pen  insulin pen needle (32G X 4 MM) 32G X 4 MM miscellaneous  Nutritional Supplements (ENSURE ACTIVE) LIQD  polyethylene glycol (MIRALAX) 17 GM/Dose powder  senna-docusate (SENOKOT-S/PERICOLACE) 8.6-50 MG tablet      Allergies   Allergen Reactions    Fentanyl Blisters     Per pt, after taking this medication had blisters develope    Tylenol [Acetaminophen] Itching    Dulaglutide Rash    Insulin Lispro Rash     Patient reported    Penicillin V Other (See Comments) and Rash     Diffuse maculopapular rash + feels \"high\", per pt.      Family History  Family History   Problem Relation Age of Onset    Diabetes Brother     Diabetes Father     Alzheimer Disease Father     Unknown/Adopted Mother     Diabetes Paternal Grandfather     Cancer No family hx of         no skin cancer    Skin Cancer No family hx of         no famiy hx of skin cancer    Glaucoma No family hx of     Macular Degeneration No family hx of      Social History   Social History     Tobacco Use    Smoking status: Some Days     Current packs/day: 0.25     Average packs/day: 0.3 packs/day for 40.0 years (10.0 ttl pk-yrs)     Types: Cigarettes    Smokeless tobacco: Former   Substance Use Topics    Alcohol use: No     Alcohol/week: 0.0 standard drinks of alcohol     Comment: Last etoh in 2007    Drug use: Not " "Currently     Types: Marijuana, Methamphetamines         A medically appropriate review of systems was performed with pertinent positives and negatives noted in the HPI, and all other systems negative.    Physical Exam   BP: 132/86  Pulse: 85  Resp: 18  Height: 162.6 cm (5' 4\")  SpO2: 100 %  Physical Exam  General: no acute distress.    HENT: Normocephalic and atraumatic. Trachea midline. Normal voice.  Eyes: EOMI, conjunctivae normal.    Cardiovascular:  Normal rate and regular rhythm.   No murmur heard.  Radial pulses 2+ bilaterally.  Pulmonary:  No respiratory distress. Normal breath sounds bilaterally.  Abdominal: no distension.  Abdomen is soft. There is no mass. There is mild generalized abdominal tenderness without any rebound or guarding.  No focal tenderness.  Normal bowel sounds.  Musculoskeletal:    Moving all extremities spontaneously.   Skin: Warm, dry, and well perfused. Good turgor.  Neurological:  No focal deficit present.    Psychiatric:   The patient is awake, alert.  Appropriate mood and affect.    ED Course, Procedures, & Data      Procedures               No results found for any visits on 04/20/24.  Medications   sodium chloride 0.9% BOLUS 1,000 mL (has no administration in time range)     Labs Ordered and Resulted from Time of ED Arrival to Time of ED Departure - No data to display  No orders to display          Critical care was not performed.     Medical Decision Making  The patient's presentation was of moderate complexity (an undiagnosed new problem with uncertain diagnosis).  The patient's evaluation involved:  review of external note(s) from 3+ sources (see separate area of note for details)  review of 3+ test result(s) ordered prior to this encounter (see separate area of note for details)  ordering and/or review of 3+ test(s) in this encounter (see separate area of note for details)  independent interpretation of testing performed by another health professional (see separate area of " note for details)  discussion of management or test interpretation with another health professional (see separate area of note for details)  The patient's management necessitated moderate risk (limitations due to social determinants of health (see separate area of note for details)) and high risk (a decision regarding hospitalization).    Assessment & Plan    Patient arrives to the emergency department for chief complaint of abdominal cramping and watery diarrhea that began yesterday.  Last CD4 count greater than 500.  From 2/7/2024 infectious disease note.  Patient states that he is compliant with his Biktarvy.    On exam, patient is afebrile and in no acute distress.  Abdominal exam with mild generalized tenderness, no peritoneal signs or concerning features.  Differential diagnosis includes but is not limited to HIV related diarrheal illness including CMV, enteric pathogens, C. difficile, medication induced, HIV infiltrative disease, not HIV related acute diarrheal illness.    Ordered broad workup including blood cultures, lactic acid, stool studies, CMV.  Patient was sitting in the hallway awaiting vertical nurse triage for lab work to be drawn.  I was informed by nursing staff that they looked for him multiple times, and he was not sitting in his chair anymore.  They called his name in the waiting room, or other patients were located due to inpatient boarding in the emergency department, which he did not respond.  He is not located in the bathroom.  After multiple attempts, nursing stated that the patient had requested to go outside for a tobacco break, which they had initially declined, and he must have left the hospital grounds without informing staff.  No lab work had been performed, no IV was left in place.  Nursing staff attempted to call the patient's phone number on file to return to the emergency department to complete his workup, however the number did not work.  Patient was not intoxicated or  holdable, and he had decision-making capacity.  He left without anyone able to have a leaving AGAINST MEDICAL ADVICE discussion with the patient prior to him just walking out the door.      I have reviewed the nursing notes. I have reviewed the findings, diagnosis, plan and need for follow up with the patient.    New Prescriptions    No medications on file       Final diagnoses:   None         [facility] EMERGENCY DEPARTMENT  4/20/2024     Daniela Mcadams MD  04/21/24 0732

## 2024-04-20 NOTE — ED TRIAGE NOTES
Pt ambulatory from home with abdominal cramping and diarrhea since yesterday. Hx of recurrent SBOs.     Triage Assessment (Adult)       Row Name 04/20/24 0716          Triage Assessment    Airway WDL WDL        Respiratory WDL    Respiratory WDL WDL        Cardiac WDL    Cardiac WDL WDL        Peripheral/Neurovascular WDL    Peripheral Neurovascular WDL WDL        Cognitive/Neuro/Behavioral WDL    Cognitive/Neuro/Behavioral WDL WDL

## 2024-04-22 ENCOUNTER — PRE VISIT (OUTPATIENT)
Dept: UROLOGY | Facility: CLINIC | Age: 59
End: 2024-04-22

## 2024-04-22 ENCOUNTER — OFFICE VISIT (OUTPATIENT)
Dept: UROLOGY | Facility: CLINIC | Age: 59
End: 2024-04-22
Payer: COMMERCIAL

## 2024-04-22 ENCOUNTER — TELEPHONE (OUTPATIENT)
Dept: ENDOCRINOLOGY | Facility: CLINIC | Age: 59
End: 2024-04-22

## 2024-04-22 VITALS
WEIGHT: 155 LBS | SYSTOLIC BLOOD PRESSURE: 138 MMHG | HEIGHT: 64 IN | HEART RATE: 97 BPM | DIASTOLIC BLOOD PRESSURE: 79 MMHG | BODY MASS INDEX: 26.46 KG/M2

## 2024-04-22 DIAGNOSIS — N52.9 ERECTILE DYSFUNCTION, UNSPECIFIED ERECTILE DYSFUNCTION TYPE: Primary | ICD-10-CM

## 2024-04-22 DIAGNOSIS — E11.65 TYPE 2 DIABETES MELLITUS WITH HYPERGLYCEMIA, UNSPECIFIED WHETHER LONG TERM INSULIN USE (H): ICD-10-CM

## 2024-04-22 PROCEDURE — 99204 OFFICE O/P NEW MOD 45 MIN: CPT | Performed by: STUDENT IN AN ORGANIZED HEALTH CARE EDUCATION/TRAINING PROGRAM

## 2024-04-22 ASSESSMENT — PAIN SCALES - GENERAL: PAINLEVEL: NO PAIN (0)

## 2024-04-22 NOTE — NURSING NOTE
"Chief Complaint   Patient presents with    Consult For     Desire for penile implant       Blood pressure 138/79, pulse 97, height 1.626 m (5' 4\"), weight 70.3 kg (155 lb). Body mass index is 26.61 kg/m .    Patient Active Problem List   Diagnosis    Allergic rhinitis due to other allergen    Brain lesion    Toxoplasma encephalitis (H)    Pulmonary nodules    Human immunodeficiency virus I infection (H)    Prurigo nodularis    Epidermal cyst    Insomnia, unspecified insomnia    Bowel obstruction (H)    Gastroesophageal reflux disease    Aphthous ulcer    Type 2 diabetes mellitus (H)    Small bowel obstruction (H)    Slow transit constipation    Erectile dysfunction, unspecified erectile dysfunction type    Rupture of ulnar collateral ligament of right thumb    Panic disorder    Generalized anxiety disorder    Major depressive disorder, single episode, unspecified    S/P appendectomy    Horseshoe tear of retina of left eye without detachment    Anal dysplasia    Homeless    Closed fracture of distal ends of right radius and ulna, initial encounter    Methamphetamine abuse (H)    Gastric outlet obstruction    Partial intestinal obstruction, unspecified cause (H)    Cellulitis of left wrist    Left sided abdominal pain    Nausea and vomiting, unspecified vomiting type    SBO (small bowel obstruction) (H)    Abdominal pain, generalized    Hyperglycemia    Abdominal pain, unspecified abdominal location       Allergies   Allergen Reactions    Fentanyl Blisters     Per pt, after taking this medication had blisters develope    Tylenol [Acetaminophen] Itching    Dulaglutide Rash    Insulin Lispro Rash     Patient reported    Penicillin V Other (See Comments) and Rash     Diffuse maculopapular rash + feels \"high\", per pt.        Current Outpatient Medications   Medication Sig Dispense Refill    alum & mag hydroxide-simethicone (MAALOX) 200-200-20 MG/5ML SUSP suspension Take 15 mLs by mouth 3 times daily as needed for " indigestion 10 mL 0    bictegravir-emtricitabine-tenofovir (BIKTARVY) -25 MG per tablet Take 1 tablet by mouth daily 30 tablet 2    blood glucose (NO BRAND SPECIFIED) lancets standard Use to test blood sugar 1 time daily or as directed. 100 Lancet 4    blood glucose (NO BRAND SPECIFIED) test strip Use to test blood sugar 1 time daily or as directed. 100 strip 4    blood glucose monitoring (NO BRAND SPECIFIED) meter device kit Use to test blood sugar 3 times daily or as directed. 1 kit 0    Continuous Blood Gluc Sensor (DEXCOM G6 SENSOR) MISC 1 each every 10 days Change every 10 days. 3 each 5    Continuous Blood Gluc Transmit (DEXCOM G6 TRANSMITTER) MISC 1 each every 3 months Change every 3 months. 1 each 1    famotidine (PEPCID) 40 MG tablet Take 1 tablet (40 mg) by mouth daily 30 tablet 2    glipiZIDE (GLUCOTROL XL) 10 MG 24 hr tablet Take 1 tablet (10 mg) by mouth daily 90 tablet 1    insulin glargine (LANTUS PEN) 100 UNIT/ML pen Inject 30 Units Subcutaneous at bedtime 15 mL 0    insulin pen needle (32G X 4 MM) 32G X 4 MM miscellaneous Use 4-5 pen needles daily or as directed. 100 each 2    Nutritional Supplements (ENSURE ACTIVE) LIQD Take 414 mLs by mouth daily 30 mL 11    polyethylene glycol (MIRALAX) 17 GM/Dose powder Take 17 g by mouth 2 times daily 510 g 0    senna-docusate (SENOKOT-S/PERICOLACE) 8.6-50 MG tablet Take 1-2 tablets by mouth 2 times daily as needed for constipation 60 tablet 0       Social History     Tobacco Use    Smoking status: Some Days     Current packs/day: 0.25     Average packs/day: 0.3 packs/day for 40.0 years (10.0 ttl pk-yrs)     Types: Cigarettes    Smokeless tobacco: Former   Substance Use Topics    Alcohol use: No     Alcohol/week: 0.0 standard drinks of alcohol     Comment: Last etoh in 2007    Drug use: Not Currently     Types: Marijuana, Methamphetamines       Kai Armenta MA  4/22/2024  1:34 PM

## 2024-04-22 NOTE — LETTER
4/22/2024       RE: Andrew Collier  [address]  [facility] [address]     Dear Colleague,    Thank you for referring your patient, Andrew Collier, to the [facility] [place] at [facility]. Please see a copy of my visit note below.    Subjective    REFERRING PROVIDER  Self-referred    REASON FOR VISIT  Desire for penile implant    HISTORY OF PRESENT ILLNESS  Mr. Fabián Collier is a 58 year old male with a past medical history significant for uncontrolled type 2 diabetes, HIV type I infection, panic disorder, anxiety, depression, and history of methamphetamine abuse who presents today for erectile dysfunction with desire for penile implant.  I personally reviewed the infectious disease note from 2/7/2024 as well as the emergency department documentation from 2/22/2024 in preparation for today's visit.    Today:  States he had an implant in 10 years ago, but does not work anymore  No pain now  No bothersome urinary symptoms   No gross hematuria   Cannot reach orgasm now    Social History:  Current smoker, 10 years x 0.25 ppd    Family History:  Denies any known family history of urologic malignancy     Objective    PHYSICAL EXAM  General, alert, oriented, no acute distress    LABORATORY  Lab Results   Component Value Date    A1C 13.4 (H) 02/06/2024    A1C 10.7 (H) 11/09/2023    A1C 13.3 (H) 08/06/2023    A1C 11.5 (H) 04/18/2023    A1C 12.8 (H) 12/07/2022    A1C 12.8 (H) 11/17/2022    A1C 11.2 (H) 02/18/2022    A1C 11.5 (H) 01/22/2022      Assessment & Plan  Erectile dysfunction with current IPP that broke about a year ago looking for replacement  Type 2 diabetes with very uncontrolled A1c values, most recently 13.4 in February 2024    It was my pleasure to meet with Mr. Fabián Collier in the office today in regards to his desire for an IPP replacement.  After reviewing his clinical history, we discussed that the main  barrier for him getting his IPP replaced at this time will be his elevated A1c value.  Generally speaking, we need his A1c value to be under 8 before we will consider an IPP replacement.  With this in mind, he will need to follow closely with his endocrinology team who he states he has a relationship with to get this value down drastically.  We discussed seeing him back in the clinic in 6 months in order to check on his A1c, though he can contact me sooner if his A1c is low enough to get a visit on the calendar to review this.    Mr. Fabián Collier expressed understanding and agreement to the above discussion and plan and all of his questions were answered to his satisfaction.     PLAN  Work on control of his hemoglobin A1c value with a goal of getting it down below 8 prior to moving forward with any discussion regarding IPP replacement  Follow-up office visit with me in 6 months to review A1c values    Signed by:    Nico Simmons PA-C

## 2024-04-29 ENCOUNTER — DOCUMENTATION ONLY (OUTPATIENT)
Dept: INFECTIOUS DISEASES | Facility: CLINIC | Age: 59
End: 2024-04-29
Payer: COMMERCIAL

## 2024-04-29 NOTE — PROGRESS NOTES
"RN went out to see if she could help patient. Patient asked if he could speak to Sharlene, when RN said that she was out of the office. He wanted to know when she would be back, RN stated \"Wednesday but not sure, that's when you have an appointment.\" He got up and walked away without saying anything else.       John Bassett RN  Infectious Disease on 4/29/2024 at 2:22 PM    "

## 2024-04-29 NOTE — PROGRESS NOTES
Patient presented to clinic as a walk in on 4/22 and today, 4/29. He was not seen by nursing staff either time as he has been told that we can not always accommodate same day appointments. Patients needs typically include requests for food, reading glasses, and/or money- he does not have any current medical needs. His last 2 visits, he has also requested forms be filled out and has been told they need to be completed by PCP. We coordinated appointments with PCP, however he unfortunately has no showed those appointments.     Today patient was provided with printouts of upcoming appointments and told that any concerns can be discussed at that appointment(Note- He has no showed 5 appointments In the last year). Patient then left clinic, but returned a few minutes later requesting to speak with Sharlene Hurley. Patient was informed she was not in today and left.

## 2024-05-18 ENCOUNTER — HOSPITAL ENCOUNTER (EMERGENCY)
Facility: CLINIC | Age: 59
Discharge: HOME | End: 2024-05-18
Attending: INTERNAL MEDICINE | Admitting: INTERNAL MEDICINE
Payer: COMMERCIAL

## 2024-05-18 VITALS
DIASTOLIC BLOOD PRESSURE: 87 MMHG | WEIGHT: 155 LBS | RESPIRATION RATE: 16 BRPM | TEMPERATURE: 97.8 F | OXYGEN SATURATION: 94 % | BODY MASS INDEX: 26.46 KG/M2 | SYSTOLIC BLOOD PRESSURE: 119 MMHG | HEART RATE: 110 BPM | HEIGHT: 64 IN

## 2024-05-18 DIAGNOSIS — R25.2 MUSCLE CRAMPING: ICD-10-CM

## 2024-05-18 LAB
ALBUMIN SERPL BCG-MCNC: 4.7 G/DL (ref 3.5–5.2)
ALBUMIN UR-MCNC: 50 MG/DL
ALP SERPL-CCNC: 129 U/L (ref 40–150)
ALT SERPL W P-5'-P-CCNC: 28 U/L (ref 0–70)
ANION GAP SERPL CALCULATED.3IONS-SCNC: 14 MMOL/L (ref 7–15)
APPEARANCE UR: ABNORMAL
AST SERPL W P-5'-P-CCNC: 35 U/L (ref 0–45)
BASOPHILS # BLD AUTO: 0.1 10E3/UL (ref 0–0.2)
BASOPHILS NFR BLD AUTO: 1 %
BILIRUB SERPL-MCNC: 1.4 MG/DL
BILIRUB UR QL STRIP: NEGATIVE
BUN SERPL-MCNC: 34.1 MG/DL (ref 8–23)
CALCIUM SERPL-MCNC: 10 MG/DL (ref 8.8–10.2)
CHLORIDE SERPL-SCNC: 102 MMOL/L (ref 98–107)
CK SERPL-CCNC: 382 U/L (ref 39–308)
COLOR UR AUTO: YELLOW
CREAT SERPL-MCNC: 1.01 MG/DL (ref 0.67–1.17)
DEPRECATED HCO3 PLAS-SCNC: 22 MMOL/L (ref 22–29)
EGFRCR SERPLBLD CKD-EPI 2021: 85 ML/MIN/1.73M2
EOSINOPHIL # BLD AUTO: 0.2 10E3/UL (ref 0–0.7)
EOSINOPHIL NFR BLD AUTO: 2 %
ERYTHROCYTE [DISTWIDTH] IN BLOOD BY AUTOMATED COUNT: 13.2 % (ref 10–15)
GLUCOSE BLDC GLUCOMTR-MCNC: 126 MG/DL (ref 70–99)
GLUCOSE SERPL-MCNC: 117 MG/DL (ref 70–99)
GLUCOSE UR STRIP-MCNC: NEGATIVE MG/DL
HCT VFR BLD AUTO: 43.7 % (ref 40–53)
HGB BLD-MCNC: 15.6 G/DL (ref 13.3–17.7)
HGB UR QL STRIP: NEGATIVE
HYALINE CASTS: 3 /LPF
IMM GRANULOCYTES # BLD: 0.1 10E3/UL
IMM GRANULOCYTES NFR BLD: 1 %
KETONES UR STRIP-MCNC: NEGATIVE MG/DL
KETONES UR STRIP-MCNC: NEGATIVE MG/DL
LEUKOCYTE ESTERASE UR QL STRIP: NEGATIVE
LYMPHOCYTES # BLD AUTO: 3.3 10E3/UL (ref 0.8–5.3)
LYMPHOCYTES NFR BLD AUTO: 33 %
MAGNESIUM SERPL-MCNC: 2.1 MG/DL (ref 1.7–2.3)
MCH RBC QN AUTO: 30.6 PG (ref 26.5–33)
MCHC RBC AUTO-ENTMCNC: 35.7 G/DL (ref 31.5–36.5)
MCV RBC AUTO: 86 FL (ref 78–100)
MONOCYTES # BLD AUTO: 0.8 10E3/UL (ref 0–1.3)
MONOCYTES NFR BLD AUTO: 8 %
MUCOUS THREADS #/AREA URNS LPF: PRESENT /LPF
NEUTROPHILS # BLD AUTO: 5.3 10E3/UL (ref 1.6–8.3)
NEUTROPHILS NFR BLD AUTO: 55 %
NITRATE UR QL: NEGATIVE
NRBC # BLD AUTO: 0 10E3/UL
NRBC BLD AUTO-RTO: 0 /100
PH UR STRIP: 5.5 [PH] (ref 5–7)
PLATELET # BLD AUTO: 482 10E3/UL (ref 150–450)
POTASSIUM SERPL-SCNC: 3.9 MMOL/L (ref 3.4–5.3)
PROT SERPL-MCNC: 8.4 G/DL (ref 6.4–8.3)
RBC # BLD AUTO: 5.09 10E6/UL (ref 4.4–5.9)
RBC URINE: 1 /HPF
SODIUM SERPL-SCNC: 138 MMOL/L (ref 135–145)
SP GR UR STRIP: 1.03 (ref 1–1.03)
SQUAMOUS EPITHELIAL: <1 /HPF
TRANSITIONAL EPI: <1 /HPF
UROBILINOGEN UR STRIP-MCNC: NORMAL MG/DL
WBC # BLD AUTO: 9.8 10E3/UL (ref 4–11)
WBC URINE: 2 /HPF

## 2024-05-18 PROCEDURE — 81003 URINALYSIS AUTO W/O SCOPE: CPT | Performed by: NURSE PRACTITIONER

## 2024-05-18 PROCEDURE — 82550 ASSAY OF CK (CPK): CPT | Performed by: NURSE PRACTITIONER

## 2024-05-18 PROCEDURE — 99284 EMERGENCY DEPT VISIT MOD MDM: CPT | Performed by: INTERNAL MEDICINE

## 2024-05-18 PROCEDURE — 258N000003 HC RX IP 258 OP 636: Performed by: NURSE PRACTITIONER

## 2024-05-18 PROCEDURE — 99284 EMERGENCY DEPT VISIT MOD MDM: CPT | Mod: FS | Performed by: INTERNAL MEDICINE

## 2024-05-18 PROCEDURE — 82962 GLUCOSE BLOOD TEST: CPT

## 2024-05-18 PROCEDURE — 80053 COMPREHEN METABOLIC PANEL: CPT | Performed by: NURSE PRACTITIONER

## 2024-05-18 PROCEDURE — 85025 COMPLETE CBC W/AUTO DIFF WBC: CPT | Performed by: NURSE PRACTITIONER

## 2024-05-18 PROCEDURE — 83735 ASSAY OF MAGNESIUM: CPT | Performed by: NURSE PRACTITIONER

## 2024-05-18 PROCEDURE — 36415 COLL VENOUS BLD VENIPUNCTURE: CPT | Performed by: NURSE PRACTITIONER

## 2024-05-18 PROCEDURE — 93005 ELECTROCARDIOGRAM TRACING: CPT | Performed by: INTERNAL MEDICINE

## 2024-05-18 PROCEDURE — 81001 URINALYSIS AUTO W/SCOPE: CPT | Performed by: NURSE PRACTITIONER

## 2024-05-18 PROCEDURE — 93010 ELECTROCARDIOGRAM REPORT: CPT | Performed by: INTERNAL MEDICINE

## 2024-05-18 RX ADMIN — SODIUM CHLORIDE 1000 ML: 9 INJECTION, SOLUTION INTRAVENOUS at 14:50

## 2024-05-18 ASSESSMENT — ACTIVITIES OF DAILY LIVING (ADL)
ADLS_ACUITY_SCORE: 37
ADLS_ACUITY_SCORE: 37
ADLS_ACUITY_SCORE: 35
ADLS_ACUITY_SCORE: 37

## 2024-05-18 NOTE — Clinical Note
Patient eloped from the department after removing his IV prior to any discussion of risks of leaving.

## 2024-05-18 NOTE — ED PROVIDER NOTES
ED Provider Note  [facility]      History     Chief Complaint   Patient presents with    Dehydration     HPI  Andrew Collier is a 58 year old male with past medical history significant for  IV polysubstance use disorder (opioid and amphetamine) with prior accidental overdoses, toxoplasma encephalitis, SBO (requiring admission 11/19 - 11/12/2023), HIV (most recent CD4 >500), housing instability, and poorly controlled DM2 (most recent A1C 13.4) who presents for evaluation of dehydration.  Patient reports he is currently living in an apartment, but still spends a lot of the day outside doing a lot of walking.  Reports for the last 2 days he has been doing a lot of walking and now is having muscle cramping.  States he likely has not been eating or drinking well. states he has been taking his oral antidiabetic today, last used long acting insulin yesterday.    Past Medical History  Past Medical History:   Diagnosis Date    Acute appendicitis with localized peritonitis 1/31/2018    AIDS (H)     Allergic rhinitis due to other allergen     DNS    Anal dysplasia     Chronic abdominal pain     CNS toxoplasmosis (H)     COVID-19 1/11/2022    Diabetes type 2, controlled (H)     GERD (gastroesophageal reflux disease)     Herpes zoster 9/23/2016    History of seizure     History of substance abuse (H)     HIV (human immunodeficiency virus infection) (H)     HLD (hyperlipidemia)     Lung nodules     Periungual wart     Pneumonia 1/6/2019    PTSD (post-traumatic stress disorder)     Sleep apnea     doesn't use CPAP     Past Surgical History:   Procedure Laterality Date    ANOSCOPY N/A 9/9/2020    Procedure: Exam Under Anesthesia, ANOSCOPY, fulgeration of rectal fissures with Rectal Biopsies;  Surgeon: Thanh Lundberg MD;  Location: [facility] OR    COLONOSCOPY Left 1/22/2016    Procedure: COMBINED COLONOSCOPY, SINGLE OR MULTIPLE BIOPSY/POLYPECTOMY BY BIOPSY;  Surgeon: Clark Saini MD;   Location: [facility] GI    ESOPHAGOSCOPY, GASTROSCOPY, DUODENOSCOPY (EGD), COMBINED N/A 6/6/2022    Procedure: ESOPHAGOGASTRODUODENOSCOPY, WITH BIOPSY;  Surgeon: Kecia Benítez MD;  Location: [facility] GI    HC EXPLORE UNDESC TESTIS,INGUIN/SCROTAL      LAPAROSCOPIC APPENDECTOMY N/A 1/31/2018    Procedure: LAPAROSCOPIC APPENDECTOMY;  LAPAROSCOPIC APPENDECTOMY;  Surgeon: Dawn Holt MD;  Location: [facility]    LAPAROSCOPY DIAGNOSTIC (GENERAL) N/A 7/26/2016    Procedure: LAPAROSCOPY DIAGNOSTIC (GENERAL);  Surgeon: Susannah Arriaga MD;  Location: [facility]    LAPAROSCOPY DIAGNOSTIC (GENERAL) N/A 4/16/2018    Procedure: LAPAROSCOPY DIAGNOSTIC (GENERAL);  Diagnostic laparoscopy and lysis of adhesions;  Surgeon: Prince Dowling MD;  Location: [facility] OR    OPTICAL TRACKING SYSTEM CRANIOTOMY, EXCISE TUMOR, COMBINED Left 4/10/2015    Procedure: COMBINED OPTICAL TRACKING SYSTEM CRANIOTOMY, EXCISE TUMOR;  Surgeon: Mirlande Colmenares MD;  Location: [facility] OR    REPAIR GAMEKEEPER'S THUMB Right 12/2/2016    Procedure: REPAIR LIGAMENT ULNAR COLLATERAL THUMB (GAMEKEEPER'S);  Surgeon: Evin Zamorano MD;  Location: [facility] OR    ZZC NONSPECIFIC PROCEDURE      right forearm fracture     alum & mag hydroxide-simethicone (MAALOX) 200-200-20 MG/5ML SUSP suspension  bictegravir-emtricitabine-tenofovir (BIKTARVY) -25 MG per tablet  blood glucose (NO BRAND SPECIFIED) lancets standard  blood glucose (NO BRAND SPECIFIED) test strip  blood glucose monitoring (NO BRAND SPECIFIED) meter device kit  Continuous Blood Gluc Sensor (DEXCOM G6 SENSOR) MISC  Continuous Blood Gluc Transmit (DEXCOM G6 TRANSMITTER) MISC  famotidine (PEPCID) 40 MG tablet  glipiZIDE (GLUCOTROL XL) 10 MG 24 hr tablet  insulin glargine (LANTUS PEN) 100 UNIT/ML pen  insulin pen needle (32G X 4 MM) 32G X 4 MM miscellaneous  Nutritional Supplements (ENSURE ACTIVE) LIQD  polyethylene glycol (MIRALAX) 17 GM/Dose powder  senna-docusate (SENOKOT-S/PERICOLACE) 8.6-50 MG  "tablet      Allergies   Allergen Reactions    Fentanyl Blisters     Per pt, after taking this medication had blisters develope    Tylenol [Acetaminophen] Itching    Dulaglutide Rash    Insulin Lispro Rash     Patient reported    Penicillin V Other (See Comments) and Rash     Diffuse maculopapular rash + feels \"high\", per pt.      Family History  Family History   Problem Relation Age of Onset    Diabetes Brother     Diabetes Father     Alzheimer Disease Father     Unknown/Adopted Mother     Diabetes Paternal Grandfather     Cancer No family hx of         no skin cancer    Skin Cancer No family hx of         no famiy hx of skin cancer    Glaucoma No family hx of     Macular Degeneration No family hx of      Social History   Social History     Tobacco Use    Smoking status: Some Days     Current packs/day: 0.25     Average packs/day: 0.3 packs/day for 40.0 years (10.0 ttl pk-yrs)     Types: Cigarettes    Smokeless tobacco: Former   Substance Use Topics    Alcohol use: No     Alcohol/week: 0.0 standard drinks of alcohol     Comment: Last etoh in 2007    Drug use: Not Currently     Types: Marijuana, Methamphetamines         A medically appropriate review of systems was performed with pertinent positives and negatives noted in the HPI, and all other systems negative.    Physical Exam   BP: (!) 131/90  Pulse: 110  Temp: 97.8  F (36.6  C)  Resp: 16  Height: 162.6 cm (5' 4\")  Weight: 70.3 kg (155 lb)  SpO2: 94 %  Physical Exam  Vitals and nursing note reviewed.   Constitutional:       Comments: Tired appearing   HENT:      Head: Normocephalic.   Cardiovascular:      Rate and Rhythm: Regular rhythm. Tachycardia present.   Pulmonary:      Effort: Pulmonary effort is normal.      Breath sounds: Normal breath sounds.   Abdominal:      Tenderness: There is no abdominal tenderness. There is no guarding or rebound.   Musculoskeletal:         General: Normal range of motion.   Skin:     General: Skin is warm and dry.      Capillary " Refill: Capillary refill takes less than 2 seconds.   Neurological:      General: No focal deficit present.      Mental Status: He is alert.   Psychiatric:         Mood and Affect: Mood normal.         ED Course, Procedures, & Data      Procedures            Results for orders placed or performed during the hospital encounter of 05/18/24   Comprehensive metabolic panel     Status: Abnormal   Result Value Ref Range    Sodium 138 135 - 145 mmol/L    Potassium 3.9 3.4 - 5.3 mmol/L    Carbon Dioxide (CO2) 22 22 - 29 mmol/L    Anion Gap 14 7 - 15 mmol/L    Urea Nitrogen 34.1 (H) 8.0 - 23.0 mg/dL    Creatinine 1.01 0.67 - 1.17 mg/dL    GFR Estimate 85 >60 mL/min/1.73m2    Calcium 10.0 8.8 - 10.2 mg/dL    Chloride 102 98 - 107 mmol/L    Glucose 117 (H) 70 - 99 mg/dL    Alkaline Phosphatase 129 40 - 150 U/L    AST 35 0 - 45 U/L    ALT 28 0 - 70 U/L    Protein Total 8.4 (H) 6.4 - 8.3 g/dL    Albumin 4.7 3.5 - 5.2 g/dL    Bilirubin Total 1.4 (H) <=1.2 mg/dL   Magnesium     Status: Normal   Result Value Ref Range    Magnesium 2.1 1.7 - 2.3 mg/dL   UA with Microscopic reflex to Culture     Status: Abnormal    Specimen: Urine, Midstream   Result Value Ref Range    Color Urine Yellow Colorless, Straw, Light Yellow, Yellow    Appearance Urine Slightly Cloudy (A) Clear    Glucose Urine Negative Negative mg/dL    Bilirubin Urine Negative Negative    Ketones Urine Negative Negative mg/dL    Specific Gravity Urine 1.031 1.003 - 1.035    Blood Urine Negative Negative    pH Urine 5.5 5.0 - 7.0    Protein Albumin Urine 50 (A) Negative mg/dL    Urobilinogen Urine Normal Normal, 2.0 mg/dL    Nitrite Urine Negative Negative    Leukocyte Esterase Urine Negative Negative    Mucus Urine Present (A) None Seen /LPF    RBC Urine 1 <=2 /HPF    WBC Urine 2 <=5 /HPF    Squamous Epithelials Urine <1 <=1 /HPF    Transitional Epithelials Urine <1 <=1 /HPF    Hyaline Casts Urine 3 (H) <=2 /LPF    Narrative    Urine Culture not indicated   CK total      Status: Abnormal   Result Value Ref Range     (H) 39 - 308 U/L   Ketones urine     Status: Normal   Result Value Ref Range    Ketones Urine Negative Negative mg/dL   CBC with platelets and differential     Status: Abnormal   Result Value Ref Range    WBC Count 9.8 4.0 - 11.0 10e3/uL    RBC Count 5.09 4.40 - 5.90 10e6/uL    Hemoglobin 15.6 13.3 - 17.7 g/dL    Hematocrit 43.7 40.0 - 53.0 %    MCV 86 78 - 100 fL    MCH 30.6 26.5 - 33.0 pg    MCHC 35.7 31.5 - 36.5 g/dL    RDW 13.2 10.0 - 15.0 %    Platelet Count 482 (H) 150 - 450 10e3/uL    % Neutrophils 55 %    % Lymphocytes 33 %    % Monocytes 8 %    % Eosinophils 2 %    % Basophils 1 %    % Immature Granulocytes 1 %    NRBCs per 100 WBC 0 <1 /100    Absolute Neutrophils 5.3 1.6 - 8.3 10e3/uL    Absolute Lymphocytes 3.3 0.8 - 5.3 10e3/uL    Absolute Monocytes 0.8 0.0 - 1.3 10e3/uL    Absolute Eosinophils 0.2 0.0 - 0.7 10e3/uL    Absolute Basophils 0.1 0.0 - 0.2 10e3/uL    Absolute Immature Granulocytes 0.1 <=0.4 10e3/uL    Absolute NRBCs 0.0 10e3/uL   Glucose by meter     Status: Abnormal   Result Value Ref Range    GLUCOSE BY METER POCT 126 (H) 70 - 99 mg/dL   CBC with platelets differential     Status: Abnormal    Narrative    The following orders were created for panel order CBC with platelets differential.  Procedure                               Abnormality         Status                     ---------                               -----------         ------                     CBC with platelets and d...[[id number]]  Abnormal            Final result                 Please view results for these tests on the individual orders.     Medications   sodium chloride 0.9% BOLUS 1,000 mL (1,000 mLs Intravenous $New Bag 5/18/24 5482)     Labs Ordered and Resulted from Time of ED Arrival to Time of ED Departure   COMPREHENSIVE METABOLIC PANEL - Abnormal       Result Value    Sodium 138      Potassium 3.9      Carbon Dioxide (CO2) 22      Anion Gap 14      Urea Nitrogen  34.1 (*)     Creatinine 1.01      GFR Estimate 85      Calcium 10.0      Chloride 102      Glucose 117 (*)     Alkaline Phosphatase 129      AST 35      ALT 28      Protein Total 8.4 (*)     Albumin 4.7      Bilirubin Total 1.4 (*)    ROUTINE UA WITH MICROSCOPIC REFLEX TO CULTURE - Abnormal    Color Urine Yellow      Appearance Urine Slightly Cloudy (*)     Glucose Urine Negative      Bilirubin Urine Negative      Ketones Urine Negative      Specific Gravity Urine 1.031      Blood Urine Negative      pH Urine 5.5      Protein Albumin Urine 50 (*)     Urobilinogen Urine Normal      Nitrite Urine Negative      Leukocyte Esterase Urine Negative      Mucus Urine Present (*)     RBC Urine 1      WBC Urine 2      Squamous Epithelials Urine <1      Transitional Epithelials Urine <1      Hyaline Casts Urine 3 (*)    CK TOTAL - Abnormal     (*)    CBC WITH PLATELETS AND DIFFERENTIAL - Abnormal    WBC Count 9.8      RBC Count 5.09      Hemoglobin 15.6      Hematocrit 43.7      MCV 86      MCH 30.6      MCHC 35.7      RDW 13.2      Platelet Count 482 (*)     % Neutrophils 55      % Lymphocytes 33      % Monocytes 8      % Eosinophils 2      % Basophils 1      % Immature Granulocytes 1      NRBCs per 100 WBC 0      Absolute Neutrophils 5.3      Absolute Lymphocytes 3.3      Absolute Monocytes 0.8      Absolute Eosinophils 0.2      Absolute Basophils 0.1      Absolute Immature Granulocytes 0.1      Absolute NRBCs 0.0     GLUCOSE BY METER - Abnormal    GLUCOSE BY METER POCT 126 (*)    MAGNESIUM - Normal    Magnesium 2.1     KETONES URINE - Normal    Ketones Urine Negative     GLUCOSE MONITOR NURSING POCT     No orders to display          Critical care was not performed.     Medical Decision Making  The patient's presentation was of moderate complexity (an acute illness with systemic symptoms).    The patient's evaluation involved:  review of external note(s) from 3+ sources (see separate area of note for details)  review of  3+ test result(s) ordered prior to this encounter (see separate area of note for details)  ordering and/or review of 3+ test(s) in this encounter (see separate area of note for details)    The patient's management necessitated moderate risk (prescription drug management including medications given in the ED).    Assessment & Plan    Andrew Collier is a 58 year old male with past medical history significant for  IV polysubstance use disorder (opioid and amphetamine) with prior accidental overdoses, toxoplasma encephalitis, SBO (requiring admission 11/19 - 11/12/2023), HIV (most recent CD4 >500), and poorly controlled DM2 (most recent A1C 13.4) who presents for evaluation of dehydration.    Patient's blood glucose is 126 today, appears to be well-controlled making DKA or hyperosmotic hyperglycemia less likely etiology for patient symptoms.     CK slightly elevated but not to level concerning for rhabdomyolysis.  No significant electrolyte or metabolic abnormalities, he does have a slight elevation in his T. bili but other liver function tests are normal.  Platelets are a bit elevated but with no leukocytosis and a normal hemoglobin.  It is possible that he is just slightly dehydrated, will give IV fluids.  Upon reassessment patient reports he is feeling somewhat better, initial plan was to give an additional liter of IV fluids, however before this could happen patient eloped from the emergency department, removing his own IV prior to leaving.  I was unable to have any discussion with him about any plan or risks of leaving as he had eloped.      Discharge Medication List as of 5/18/2024  5:05 PM          Final diagnoses:   Muscle cramping     VIKASH Norton CNP   [facility] EMERGENCY DEPARTMENT  5/18/2024     Radha Ford APRN CNP  05/18/24 1747    --    ED Attending Physician Attestation    I Zaire Oropeza MD, MD, cared for this patient with the Advanced Practice Provider (KASSY). I  have performed a history and physical examination of the patient independent of the KASSY. I reviewed the KASSY's documentation above and agree with the documented findings and plan of care. I personally provided a substantive portion of the care for this patient, including the complete Medical Decision Making. Please see the KASSY's documentation for full details.    Summary of HPI, PE, ED Course   Patient is a 58 year old male evaluated in the emergency department for muscle cramps. Exam and ED course notable for labs without acute change, IVF with improvements. After the completion of care in the emergency department, the patient was discharged.    Critical Care & Procedures  Not applicable.        Medical Decision Making  The patient's presentation was of moderate complexity (a chronic illness mild to moderate exacerbation, progression, or side effect of treatment).    The patient's evaluation involved:  ordering and/or review of 3+ test(s) in this encounter (see separate area of note for details)    The patient's management necessitated moderate risk (prescription drug management including medications given in the ED).          Zaire Oropeza MD, MD  Emergency Medicine         Zaire Oropeza MD  05/18/24 [id number]

## 2024-05-18 NOTE — PROVIDER NOTIFICATION
Pt. Ripped out IV and left lobby without consulting staff. Sandip notified.    Salome Hart RN on 5/18/2024 at 5:03 PM

## 2024-05-18 NOTE — ED TRIAGE NOTES
"Triage Assessment & Note:    BP (!) 131/90   Pulse 110   Temp 97.8  F (36.6  C) (Oral)   Resp 16   Ht 1.626 m (5' 4\")   Wt 70.3 kg (155 lb)   SpO2 94%   BMI 26.61 kg/m        Patient presents with: Pt comes ambulatory to triage with reports of dehydration and joint pain. No reports of fever, cough, SOB, CP, or travel.     Home Treatments/Remedies: Home medications    Febrile / Afebrile: afebrile    Duration of C/o: < 24 hrs    Anita Tarango RN  May 18, 2024           "

## 2024-05-22 LAB
ATRIAL RATE - MUSE: 108 BPM
DIASTOLIC BLOOD PRESSURE - MUSE: NORMAL MMHG
INTERPRETATION ECG - MUSE: NORMAL
P AXIS - MUSE: 66 DEGREES
PR INTERVAL - MUSE: 124 MS
QRS DURATION - MUSE: 86 MS
QT - MUSE: 338 MS
QTC - MUSE: 452 MS
R AXIS - MUSE: 32 DEGREES
SYSTOLIC BLOOD PRESSURE - MUSE: NORMAL MMHG
T AXIS - MUSE: 47 DEGREES
VENTRICULAR RATE- MUSE: 108 BPM

## 2024-07-13 ENCOUNTER — HOSPITAL ENCOUNTER (INPATIENT)
Facility: CLINIC | Age: 59
LOS: 2 days | Discharge: LEFT BEFORE DISCHARGE | End: 2024-07-15
Attending: EMERGENCY MEDICINE | Admitting: HOSPITALIST
Payer: COMMERCIAL

## 2024-07-13 ENCOUNTER — APPOINTMENT (OUTPATIENT)
Dept: GENERAL RADIOLOGY | Facility: CLINIC | Age: 59
End: 2024-07-13
Payer: COMMERCIAL

## 2024-07-13 ENCOUNTER — APPOINTMENT (OUTPATIENT)
Dept: CT IMAGING | Facility: CLINIC | Age: 59
End: 2024-07-13
Payer: COMMERCIAL

## 2024-07-13 DIAGNOSIS — K56.600 PARTIAL SMALL BOWEL OBSTRUCTION (H): ICD-10-CM

## 2024-07-13 DIAGNOSIS — R10.32 LLQ ABDOMINAL PAIN: ICD-10-CM

## 2024-07-13 DIAGNOSIS — K21.9 GASTROESOPHAGEAL REFLUX DISEASE WITHOUT ESOPHAGITIS: ICD-10-CM

## 2024-07-13 LAB
ALBUMIN SERPL BCG-MCNC: 4.4 G/DL (ref 3.5–5.2)
ALP SERPL-CCNC: 129 U/L (ref 40–150)
ALT SERPL W P-5'-P-CCNC: 18 U/L (ref 0–70)
ANION GAP SERPL CALCULATED.3IONS-SCNC: 11 MMOL/L (ref 7–15)
AST SERPL W P-5'-P-CCNC: 18 U/L (ref 0–45)
BASOPHILS # BLD AUTO: 0.1 10E3/UL (ref 0–0.2)
BASOPHILS NFR BLD AUTO: 0 %
BILIRUB SERPL-MCNC: 0.8 MG/DL
BUN SERPL-MCNC: 18 MG/DL (ref 8–23)
CALCIUM SERPL-MCNC: 10.4 MG/DL (ref 8.8–10.2)
CHLORIDE SERPL-SCNC: 99 MMOL/L (ref 98–107)
CREAT SERPL-MCNC: 0.81 MG/DL (ref 0.67–1.17)
DEPRECATED HCO3 PLAS-SCNC: 26 MMOL/L (ref 22–29)
EGFRCR SERPLBLD CKD-EPI 2021: >90 ML/MIN/1.73M2
EOSINOPHIL # BLD AUTO: 0.1 10E3/UL (ref 0–0.7)
EOSINOPHIL NFR BLD AUTO: 1 %
ERYTHROCYTE [DISTWIDTH] IN BLOOD BY AUTOMATED COUNT: 13.2 % (ref 10–15)
GLUCOSE BLDC GLUCOMTR-MCNC: 308 MG/DL (ref 70–99)
GLUCOSE SERPL-MCNC: 323 MG/DL (ref 70–99)
HBA1C MFR BLD: 11.7 %
HCT VFR BLD AUTO: 45.7 % (ref 40–53)
HGB BLD-MCNC: 15.6 G/DL (ref 13.3–17.7)
HOLD SPECIMEN: NORMAL
HOLD SPECIMEN: NORMAL
IMM GRANULOCYTES # BLD: 0.1 10E3/UL
IMM GRANULOCYTES NFR BLD: 1 %
LACTATE SERPL-SCNC: 1.5 MMOL/L (ref 0.7–2)
LIPASE SERPL-CCNC: 34 U/L (ref 13–60)
LYMPHOCYTES # BLD AUTO: 2.8 10E3/UL (ref 0.8–5.3)
LYMPHOCYTES NFR BLD AUTO: 25 %
MAGNESIUM SERPL-MCNC: 1.8 MG/DL (ref 1.7–2.3)
MCH RBC QN AUTO: 30.4 PG (ref 26.5–33)
MCHC RBC AUTO-ENTMCNC: 34.1 G/DL (ref 31.5–36.5)
MCV RBC AUTO: 89 FL (ref 78–100)
MONOCYTES # BLD AUTO: 0.7 10E3/UL (ref 0–1.3)
MONOCYTES NFR BLD AUTO: 6 %
NEUTROPHILS # BLD AUTO: 7.5 10E3/UL (ref 1.6–8.3)
NEUTROPHILS NFR BLD AUTO: 67 %
NRBC # BLD AUTO: 0 10E3/UL
NRBC BLD AUTO-RTO: 0 /100
PHOSPHATE SERPL-MCNC: 3.3 MG/DL (ref 2.5–4.5)
PLATELET # BLD AUTO: 431 10E3/UL (ref 150–450)
POTASSIUM SERPL-SCNC: 4.1 MMOL/L (ref 3.4–5.3)
PROT SERPL-MCNC: 8.2 G/DL (ref 6.4–8.3)
RBC # BLD AUTO: 5.14 10E6/UL (ref 4.4–5.9)
SODIUM SERPL-SCNC: 136 MMOL/L (ref 135–145)
TROPONIN T SERPL HS-MCNC: 11 NG/L
WBC # BLD AUTO: 11.2 10E3/UL (ref 4–11)

## 2024-07-13 PROCEDURE — 120N000002 HC R&B MED SURG/OB UMMC

## 2024-07-13 PROCEDURE — 99223 1ST HOSP IP/OBS HIGH 75: CPT | Mod: FS | Performed by: HOSPITALIST

## 2024-07-13 PROCEDURE — 84484 ASSAY OF TROPONIN QUANT: CPT

## 2024-07-13 PROCEDURE — 83735 ASSAY OF MAGNESIUM: CPT

## 2024-07-13 PROCEDURE — 258N000003 HC RX IP 258 OP 636

## 2024-07-13 PROCEDURE — 73630 X-RAY EXAM OF FOOT: CPT | Mod: RT

## 2024-07-13 PROCEDURE — 250N000009 HC RX 250

## 2024-07-13 PROCEDURE — 74018 RADEX ABDOMEN 1 VIEW: CPT | Mod: 26 | Performed by: RADIOLOGY

## 2024-07-13 PROCEDURE — 83036 HEMOGLOBIN GLYCOSYLATED A1C: CPT | Performed by: PHYSICIAN ASSISTANT

## 2024-07-13 PROCEDURE — 93010 ELECTROCARDIOGRAM REPORT: CPT | Performed by: EMERGENCY MEDICINE

## 2024-07-13 PROCEDURE — 93005 ELECTROCARDIOGRAM TRACING: CPT | Performed by: EMERGENCY MEDICINE

## 2024-07-13 PROCEDURE — 250N000011 HC RX IP 250 OP 636

## 2024-07-13 PROCEDURE — 99282 EMERGENCY DEPT VISIT SF MDM: CPT | Performed by: SURGERY

## 2024-07-13 PROCEDURE — 74177 CT ABD & PELVIS W/CONTRAST: CPT

## 2024-07-13 PROCEDURE — 99285 EMERGENCY DEPT VISIT HI MDM: CPT | Mod: 25,FS | Performed by: EMERGENCY MEDICINE

## 2024-07-13 PROCEDURE — 99285 EMERGENCY DEPT VISIT HI MDM: CPT | Mod: 25 | Performed by: EMERGENCY MEDICINE

## 2024-07-13 PROCEDURE — 73630 X-RAY EXAM OF FOOT: CPT | Mod: 26 | Performed by: RADIOLOGY

## 2024-07-13 PROCEDURE — 258N000003 HC RX IP 258 OP 636: Performed by: PHYSICIAN ASSISTANT

## 2024-07-13 PROCEDURE — 36415 COLL VENOUS BLD VENIPUNCTURE: CPT | Performed by: STUDENT IN AN ORGANIZED HEALTH CARE EDUCATION/TRAINING PROGRAM

## 2024-07-13 PROCEDURE — 250N000011 HC RX IP 250 OP 636: Performed by: EMERGENCY MEDICINE

## 2024-07-13 PROCEDURE — 96375 TX/PRO/DX INJ NEW DRUG ADDON: CPT | Performed by: EMERGENCY MEDICINE

## 2024-07-13 PROCEDURE — 36415 COLL VENOUS BLD VENIPUNCTURE: CPT

## 2024-07-13 PROCEDURE — 74177 CT ABD & PELVIS W/CONTRAST: CPT | Mod: 26 | Performed by: STUDENT IN AN ORGANIZED HEALTH CARE EDUCATION/TRAINING PROGRAM

## 2024-07-13 PROCEDURE — 85025 COMPLETE CBC W/AUTO DIFF WBC: CPT

## 2024-07-13 PROCEDURE — 83605 ASSAY OF LACTIC ACID: CPT | Performed by: STUDENT IN AN ORGANIZED HEALTH CARE EDUCATION/TRAINING PROGRAM

## 2024-07-13 PROCEDURE — 99207 PR APP CREDIT; MD BILLING SHARED VISIT: CPT | Mod: FS | Performed by: PHYSICIAN ASSISTANT

## 2024-07-13 PROCEDURE — 80053 COMPREHEN METABOLIC PANEL: CPT

## 2024-07-13 PROCEDURE — 96361 HYDRATE IV INFUSION ADD-ON: CPT | Performed by: EMERGENCY MEDICINE

## 2024-07-13 PROCEDURE — 999N000065 XR ABDOMEN PORT 1 VIEW

## 2024-07-13 PROCEDURE — 250N000013 HC RX MED GY IP 250 OP 250 PS 637

## 2024-07-13 PROCEDURE — 83690 ASSAY OF LIPASE: CPT

## 2024-07-13 PROCEDURE — 84100 ASSAY OF PHOSPHORUS: CPT | Performed by: PHYSICIAN ASSISTANT

## 2024-07-13 PROCEDURE — 82962 GLUCOSE BLOOD TEST: CPT

## 2024-07-13 PROCEDURE — 96374 THER/PROPH/DIAG INJ IV PUSH: CPT | Mod: 59 | Performed by: EMERGENCY MEDICINE

## 2024-07-13 RX ORDER — CALCIUM CARBONATE 500 MG/1
500 TABLET, CHEWABLE ORAL ONCE
Status: COMPLETED | OUTPATIENT
Start: 2024-07-13 | End: 2024-07-13

## 2024-07-13 RX ORDER — LIDOCAINE 40 MG/G
CREAM TOPICAL
Status: DISCONTINUED | OUTPATIENT
Start: 2024-07-13 | End: 2024-07-15 | Stop reason: HOSPADM

## 2024-07-13 RX ORDER — ONDANSETRON 2 MG/ML
4 INJECTION INTRAMUSCULAR; INTRAVENOUS EVERY 6 HOURS PRN
Status: DISCONTINUED | OUTPATIENT
Start: 2024-07-13 | End: 2024-07-15 | Stop reason: HOSPADM

## 2024-07-13 RX ORDER — SODIUM CHLORIDE 9 MG/ML
INJECTION, SOLUTION INTRAVENOUS CONTINUOUS
Status: DISCONTINUED | OUTPATIENT
Start: 2024-07-13 | End: 2024-07-15 | Stop reason: HOSPADM

## 2024-07-13 RX ORDER — MAGNESIUM HYDROXIDE/ALUMINUM HYDROXICE/SIMETHICONE 120; 1200; 1200 MG/30ML; MG/30ML; MG/30ML
15 SUSPENSION ORAL ONCE
Status: COMPLETED | OUTPATIENT
Start: 2024-07-13 | End: 2024-07-13

## 2024-07-13 RX ORDER — ONDANSETRON 4 MG/1
4 TABLET, ORALLY DISINTEGRATING ORAL EVERY 6 HOURS PRN
Status: DISCONTINUED | OUTPATIENT
Start: 2024-07-13 | End: 2024-07-15 | Stop reason: HOSPADM

## 2024-07-13 RX ORDER — HYDROMORPHONE HYDROCHLORIDE 1 MG/ML
0.5 INJECTION, SOLUTION INTRAMUSCULAR; INTRAVENOUS; SUBCUTANEOUS ONCE
Status: COMPLETED | OUTPATIENT
Start: 2024-07-13 | End: 2024-07-13

## 2024-07-13 RX ORDER — MAGNESIUM SULFATE HEPTAHYDRATE 40 MG/ML
2 INJECTION, SOLUTION INTRAVENOUS ONCE
Status: COMPLETED | OUTPATIENT
Start: 2024-07-13 | End: 2024-07-14

## 2024-07-13 RX ORDER — ONDANSETRON 2 MG/ML
4 INJECTION INTRAMUSCULAR; INTRAVENOUS ONCE
Status: COMPLETED | OUTPATIENT
Start: 2024-07-13 | End: 2024-07-13

## 2024-07-13 RX ORDER — NICOTINE POLACRILEX 4 MG
15-30 LOZENGE BUCCAL
Status: DISCONTINUED | OUTPATIENT
Start: 2024-07-13 | End: 2024-07-15 | Stop reason: HOSPADM

## 2024-07-13 RX ORDER — DEXTROSE MONOHYDRATE 25 G/50ML
25-50 INJECTION, SOLUTION INTRAVENOUS
Status: DISCONTINUED | OUTPATIENT
Start: 2024-07-13 | End: 2024-07-15 | Stop reason: HOSPADM

## 2024-07-13 RX ORDER — LIDOCAINE HYDROCHLORIDE 20 MG/ML
10 SOLUTION OROPHARYNGEAL ONCE
Status: COMPLETED | OUTPATIENT
Start: 2024-07-13 | End: 2024-07-13

## 2024-07-13 RX ORDER — IOPAMIDOL 755 MG/ML
95 INJECTION, SOLUTION INTRAVASCULAR ONCE
Status: COMPLETED | OUTPATIENT
Start: 2024-07-13 | End: 2024-07-13

## 2024-07-13 RX ADMIN — LIDOCAINE HYDROCHLORIDE 10 ML: 20 SOLUTION OROPHARYNGEAL at 15:06

## 2024-07-13 RX ADMIN — SODIUM CHLORIDE 1000 ML: 9 INJECTION, SOLUTION INTRAVENOUS at 15:20

## 2024-07-13 RX ADMIN — ONDANSETRON 4 MG: 2 INJECTION INTRAMUSCULAR; INTRAVENOUS at 15:18

## 2024-07-13 RX ADMIN — SODIUM CHLORIDE: 9 INJECTION, SOLUTION INTRAVENOUS at 23:35

## 2024-07-13 RX ADMIN — HYDROMORPHONE HYDROCHLORIDE 0.5 MG: 1 INJECTION, SOLUTION INTRAMUSCULAR; INTRAVENOUS; SUBCUTANEOUS at 21:52

## 2024-07-13 RX ADMIN — ONDANSETRON 4 MG: 2 INJECTION INTRAMUSCULAR; INTRAVENOUS at 20:38

## 2024-07-13 RX ADMIN — PANTOPRAZOLE SODIUM 40 MG: 40 INJECTION, POWDER, FOR SOLUTION INTRAVENOUS at 18:17

## 2024-07-13 RX ADMIN — ALUMINUM HYDROXIDE, MAGNESIUM HYDROXIDE, AND SIMETHICONE 15 ML: 200; 200; 20 SUSPENSION ORAL at 15:06

## 2024-07-13 RX ADMIN — ANTACID TABLETS 500 MG: 500 TABLET, CHEWABLE ORAL at 18:16

## 2024-07-13 RX ADMIN — HYDROMORPHONE HYDROCHLORIDE 0.5 MG: 1 INJECTION, SOLUTION INTRAMUSCULAR; INTRAVENOUS; SUBCUTANEOUS at 20:38

## 2024-07-13 RX ADMIN — IOPAMIDOL 95 ML: 755 INJECTION, SOLUTION INTRAVENOUS at 16:22

## 2024-07-13 ASSESSMENT — ACTIVITIES OF DAILY LIVING (ADL)
ADLS_ACUITY_SCORE: 37
ADLS_ACUITY_SCORE: 35
ADLS_ACUITY_SCORE: 37

## 2024-07-13 NOTE — ED TRIAGE NOTES
Pt ambulatory from streets with abdominal pain and N/V. Pt states symptoms started last night. Hx of recurrent SBO.     Triage Assessment (Adult)       Row Name 07/13/24 1322          Triage Assessment    Airway WDL WDL        Respiratory WDL    Respiratory WDL WDL        Cardiac WDL    Cardiac WDL WDL        Peripheral/Neurovascular WDL    Peripheral Neurovascular WDL WDL        Cognitive/Neuro/Behavioral WDL    Cognitive/Neuro/Behavioral WDL WDL
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
## 2024-07-14 LAB
ALBUMIN UR-MCNC: 20 MG/DL
ANION GAP SERPL CALCULATED.3IONS-SCNC: 11 MMOL/L (ref 7–15)
APPEARANCE UR: CLEAR
ATRIAL RATE - MUSE: 108 BPM
BILIRUB UR QL STRIP: NEGATIVE
BUN SERPL-MCNC: 15.7 MG/DL (ref 8–23)
CALCIUM SERPL-MCNC: 9.3 MG/DL (ref 8.8–10.2)
CHLORIDE SERPL-SCNC: 103 MMOL/L (ref 98–107)
COLOR UR AUTO: YELLOW
CREAT SERPL-MCNC: 0.63 MG/DL (ref 0.67–1.17)
DEPRECATED HCO3 PLAS-SCNC: 20 MMOL/L (ref 22–29)
DIASTOLIC BLOOD PRESSURE - MUSE: NORMAL MMHG
EGFRCR SERPLBLD CKD-EPI 2021: >90 ML/MIN/1.73M2
ERYTHROCYTE [DISTWIDTH] IN BLOOD BY AUTOMATED COUNT: 13.2 % (ref 10–15)
GLUCOSE BLDC GLUCOMTR-MCNC: 184 MG/DL (ref 70–99)
GLUCOSE BLDC GLUCOMTR-MCNC: 195 MG/DL (ref 70–99)
GLUCOSE BLDC GLUCOMTR-MCNC: 243 MG/DL (ref 70–99)
GLUCOSE BLDC GLUCOMTR-MCNC: 244 MG/DL (ref 70–99)
GLUCOSE BLDC GLUCOMTR-MCNC: 280 MG/DL (ref 70–99)
GLUCOSE SERPL-MCNC: 200 MG/DL (ref 70–99)
GLUCOSE UR STRIP-MCNC: >=1000 MG/DL
HCT VFR BLD AUTO: 43.2 % (ref 40–53)
HGB BLD-MCNC: 14.5 G/DL (ref 13.3–17.7)
HGB UR QL STRIP: NEGATIVE
INTERPRETATION ECG - MUSE: NORMAL
KETONES UR STRIP-MCNC: ABNORMAL MG/DL
LEUKOCYTE ESTERASE UR QL STRIP: NEGATIVE
MCH RBC QN AUTO: 30.5 PG (ref 26.5–33)
MCHC RBC AUTO-ENTMCNC: 33.6 G/DL (ref 31.5–36.5)
MCV RBC AUTO: 91 FL (ref 78–100)
MUCOUS THREADS #/AREA URNS LPF: PRESENT /LPF
NITRATE UR QL: NEGATIVE
P AXIS - MUSE: 41 DEGREES
PH UR STRIP: 6 [PH] (ref 5–7)
PLATELET # BLD AUTO: 322 10E3/UL (ref 150–450)
POTASSIUM SERPL-SCNC: 4 MMOL/L (ref 3.4–5.3)
PR INTERVAL - MUSE: 118 MS
QRS DURATION - MUSE: 82 MS
QT - MUSE: 342 MS
QTC - MUSE: 458 MS
R AXIS - MUSE: -36 DEGREES
RBC # BLD AUTO: 4.76 10E6/UL (ref 4.4–5.9)
RBC URINE: 2 /HPF
SODIUM SERPL-SCNC: 134 MMOL/L (ref 135–145)
SP GR UR STRIP: 1.02 (ref 1–1.03)
SYSTOLIC BLOOD PRESSURE - MUSE: NORMAL MMHG
T AXIS - MUSE: 26 DEGREES
UROBILINOGEN UR STRIP-MCNC: NORMAL MG/DL
VENTRICULAR RATE- MUSE: 108 BPM
WBC # BLD AUTO: 9 10E3/UL (ref 4–11)
WBC URINE: <1 /HPF

## 2024-07-14 PROCEDURE — 36415 COLL VENOUS BLD VENIPUNCTURE: CPT | Performed by: PHYSICIAN ASSISTANT

## 2024-07-14 PROCEDURE — 999N000248 HC STATISTIC IV INSERT WITH US BY RN

## 2024-07-14 PROCEDURE — 250N000012 HC RX MED GY IP 250 OP 636 PS 637: Performed by: PHYSICIAN ASSISTANT

## 2024-07-14 PROCEDURE — 250N000011 HC RX IP 250 OP 636: Performed by: HOSPITALIST

## 2024-07-14 PROCEDURE — 82962 GLUCOSE BLOOD TEST: CPT

## 2024-07-14 PROCEDURE — 81001 URINALYSIS AUTO W/SCOPE: CPT | Performed by: PHYSICIAN ASSISTANT

## 2024-07-14 PROCEDURE — 99233 SBSQ HOSP IP/OBS HIGH 50: CPT | Performed by: HOSPITALIST

## 2024-07-14 PROCEDURE — 85027 COMPLETE CBC AUTOMATED: CPT | Performed by: PHYSICIAN ASSISTANT

## 2024-07-14 PROCEDURE — 250N000011 HC RX IP 250 OP 636: Performed by: PHYSICIAN ASSISTANT

## 2024-07-14 PROCEDURE — 250N000013 HC RX MED GY IP 250 OP 250 PS 637: Performed by: PHYSICIAN ASSISTANT

## 2024-07-14 PROCEDURE — 120N000002 HC R&B MED SURG/OB UMMC

## 2024-07-14 PROCEDURE — 258N000003 HC RX IP 258 OP 636: Performed by: PHYSICIAN ASSISTANT

## 2024-07-14 PROCEDURE — 80048 BASIC METABOLIC PNL TOTAL CA: CPT | Performed by: PHYSICIAN ASSISTANT

## 2024-07-14 RX ORDER — HYDROMORPHONE HYDROCHLORIDE 1 MG/ML
0.5 INJECTION, SOLUTION INTRAMUSCULAR; INTRAVENOUS; SUBCUTANEOUS
Status: COMPLETED | OUTPATIENT
Start: 2024-07-14 | End: 2024-07-14

## 2024-07-14 RX ORDER — HYDROMORPHONE HYDROCHLORIDE 1 MG/ML
0.5 INJECTION, SOLUTION INTRAMUSCULAR; INTRAVENOUS; SUBCUTANEOUS ONCE
Status: DISCONTINUED | OUTPATIENT
Start: 2024-07-14 | End: 2024-07-14

## 2024-07-14 RX ORDER — HEPARIN SODIUM 5000 [USP'U]/.5ML
5000 INJECTION, SOLUTION INTRAVENOUS; SUBCUTANEOUS EVERY 8 HOURS
Status: DISCONTINUED | OUTPATIENT
Start: 2024-07-14 | End: 2024-07-15 | Stop reason: HOSPADM

## 2024-07-14 RX ORDER — HYDROMORPHONE HCL IN WATER/PF 6 MG/30 ML
0.2 PATIENT CONTROLLED ANALGESIA SYRINGE INTRAVENOUS ONCE
Status: COMPLETED | OUTPATIENT
Start: 2024-07-14 | End: 2024-07-14

## 2024-07-14 RX ORDER — HYDROMORPHONE HCL IN WATER/PF 6 MG/30 ML
0.2 PATIENT CONTROLLED ANALGESIA SYRINGE INTRAVENOUS EVERY 4 HOURS PRN
Status: DISCONTINUED | OUTPATIENT
Start: 2024-07-14 | End: 2024-07-14

## 2024-07-14 RX ADMIN — BICTEGRAVIR SODIUM, EMTRICITABINE, AND TENOFOVIR ALAFENAMIDE FUMARATE 1 TABLET: 50; 200; 25 TABLET ORAL at 09:33

## 2024-07-14 RX ADMIN — HYDROMORPHONE HYDROCHLORIDE 0.5 MG: 1 INJECTION, SOLUTION INTRAMUSCULAR; INTRAVENOUS; SUBCUTANEOUS at 20:36

## 2024-07-14 RX ADMIN — HYDROMORPHONE HYDROCHLORIDE 0.2 MG: 0.2 INJECTION, SOLUTION INTRAMUSCULAR; INTRAVENOUS; SUBCUTANEOUS at 04:23

## 2024-07-14 RX ADMIN — INSULIN GLARGINE 18 UNITS: 100 INJECTION, SOLUTION SUBCUTANEOUS at 00:50

## 2024-07-14 RX ADMIN — ONDANSETRON 4 MG: 4 TABLET, ORALLY DISINTEGRATING ORAL at 15:29

## 2024-07-14 RX ADMIN — INSULIN GLARGINE 18 UNITS: 100 INJECTION, SOLUTION SUBCUTANEOUS at 22:45

## 2024-07-14 RX ADMIN — ONDANSETRON 4 MG: 2 INJECTION INTRAMUSCULAR; INTRAVENOUS at 04:24

## 2024-07-14 RX ADMIN — SODIUM CHLORIDE: 9 INJECTION, SOLUTION INTRAVENOUS at 22:43

## 2024-07-14 RX ADMIN — INSULIN ASPART 3 UNITS: 100 INJECTION, SOLUTION INTRAVENOUS; SUBCUTANEOUS at 15:29

## 2024-07-14 RX ADMIN — HEPARIN SODIUM 5000 UNITS: 5000 INJECTION, SOLUTION INTRAVENOUS; SUBCUTANEOUS at 13:37

## 2024-07-14 RX ADMIN — INSULIN ASPART 3 UNITS: 100 INJECTION, SOLUTION INTRAVENOUS; SUBCUTANEOUS at 00:50

## 2024-07-14 RX ADMIN — INSULIN ASPART 3 UNITS: 100 INJECTION, SOLUTION INTRAVENOUS; SUBCUTANEOUS at 20:45

## 2024-07-14 RX ADMIN — PANTOPRAZOLE SODIUM 40 MG: 40 INJECTION, POWDER, FOR SOLUTION INTRAVENOUS at 09:33

## 2024-07-14 RX ADMIN — MAGNESIUM SULFATE HEPTAHYDRATE 2 G: 2 INJECTION, SOLUTION INTRAVENOUS at 00:51

## 2024-07-14 RX ADMIN — HEPARIN SODIUM 5000 UNITS: 5000 INJECTION, SOLUTION INTRAVENOUS; SUBCUTANEOUS at 19:42

## 2024-07-14 RX ADMIN — INSULIN ASPART 2 UNITS: 100 INJECTION, SOLUTION INTRAVENOUS; SUBCUTANEOUS at 04:48

## 2024-07-14 ASSESSMENT — ACTIVITIES OF DAILY LIVING (ADL)
ADLS_ACUITY_SCORE: 37
DEPENDENT_IADLS:: TRANSPORTATION
ADLS_ACUITY_SCORE: 37

## 2024-07-14 NOTE — PROGRESS NOTES
Surgery Progress Note  07/14/2024       Subjective:  NAEO. Resting comfortably in bed. Mentioned multiple times desire to be discharged.     1132 notified by nurse of 10/10 abd pain s/p NG removal. Pt examined. Pt sleeping when entering room. No TTP and no peritoneal signs.      Objective:  Temp:  [98.3  F (36.8  C)-99.3  F (37.4  C)] 98.3  F (36.8  C)  Pulse:  [] 79  Resp:  [17-18] 18  BP: (121-149)/(81-92) 121/81  SpO2:  [96 %-100 %] 99 %    I/O last 3 completed shifts:  In: -   Out: 1000 [Emesis/NG output:1000]      Gen: Awake, alert, NAD  Resp: NLB on RA  Abd: soft, nondistended, nontender, no peritoneal signs  Ext: WWP, no edema     Labs:  Recent Labs   Lab 07/13/24  1517   WBC 11.2*   HGB 15.6          Recent Labs   Lab 07/14/24  0919 07/14/24  0442 07/14/24  0009 07/13/24  1517   NA  --   --   --  136   POTASSIUM  --   --   --  4.1   CHLORIDE  --   --   --  99   CO2  --   --   --  26   BUN  --   --   --  18.0   CR  --   --   --  0.81   * 195* 280* 323*   LINDA  --   --   --  10.4*   MAG  --   --   --  1.8   PHOS  --   --   --  3.3       Imaging:  XR Abdomen Port 1 View    Result Date: 7/13/2024  EXAM: XR ABDOMEN PORT 1 VIEW LOCATION: [facility] DATE: 7/13/2024 INDICATION: Confirm NG tube placement COMPARISON: 11/8/2023     IMPRESSION: NG tube tip and sidehole in the stomach. Urinary excretion of IV contrast. No change from prior.     CT Abdomen Pelvis w Contrast    Result Date: 7/13/2024  EXAMINATION: CT ABDOMEN PELVIS W CONTRAST  7/13/2024 4:37 PM  CLINICAL HISTORY: LLQ abdominal pain, nausea, vomiting COMPARISON: 11/19/2023 PROCEDURE COMMENTS: CT of the abdomen was performed with [id number]:95mL intravenous contrast. Coronal and sagittal reformatted images were obtained. FINDINGS: Lower thorax: Mild bibasilar atelectasis. No pleural effusion. Abdomen and pelvis: The liver and biliary system, spleen, and pancreas are normal in appearance. Focal  fatty infiltration of the liver along the falciform ligament. Cholelithiasis without evidence of cholecystitis. The adrenal glands and kidneys are normal in appearance. There are dilated loops of small bowel measuring up to 4.1 cm. Area of partial obstruction is seen on image 296 of series 4 with partially decompressed bowel distally to this transition point. There is fecal material and air within the large bowel. There is no free air or free fluid. Diffuse diverticulosis without evidence of diverticulitis. Diffuse wall thickening of the imaged esophagus. There are no abnormally sized lymph nodes. Penile prosthesis. Prostatomegaly. Diffuse wall thickening of the urinary bladder. Osseous structures: Degenerative changes of the lumbar and thoracic spine with probable chronic compression deformities of T8 and T9.     IMPRESSION: 1. Partially obstructed small bowel with partially decompressed small bowel distal to transition point in the mid abdomen. 2. Diffuse wall thickening of the visualized esophagus suggesting esophagitis, possibly related to reported vomiting. 3. Diffuse wall thickening of the urinary bladder may indicate chronic outlet obstruction versus cystitis.. 4. Cholelithiasis without evidence cholecystitis. I have personally reviewed the examination and initial interpretation and I agree with the findings. ASH KAY DO   SYSTEM ID:  [id number]    XR Foot Right G/E 3 Views    Result Date: 7/13/2024  EXAM: XR FOOT RIGHT G/E 3 VIEWS LOCATION: [facility] DATE: 7/13/2024 INDICATION: Evaluate for possible foreign body to plantar foot; wound present with pain; patient suspects he stepped on a piece of glass COMPARISON: 6/20/2023     IMPRESSION: Normal joint spaces and alignment. No acute fracture. No radiopaque foreign body.        Assessment/Plan:   Andrew Collier is a 58 year old male admitted with a history of HIV and previous SBOs who presents with  "vomiting and abdominal pain, found to have a partial SBO on CT scan. Vital signs within normal limits. WBC 11.2 on admission. No peritonitis on exam on admission or today. Pt reports passing flatus and improving abd pain. He has been successfully managed with conservative therapy in the past and is considered a high surgical risk candidate given multiple past surgeries and immunosuppression. Was NPO to LIS overnight. Re pleated electrolytes and required minimal narcotics. Refused GGC and lab draw this morning. Would like to be discharged immediately. Encouraged him to stay to get GGC with potential to ADAT. Pt adamantly refused. Gave recommendations of how to self treat at home (start with small, CLD and go from there). EGS signing off.           Seen, examined, and discussed with chief resident, who will discuss with staff.  - - - - - - - - - - - - - - - - - -  DO ENID ResendizA  PGY-1 General Surgery    For Questions, please search \"General Surgery [facility]\" in [employer] and use listings as detailed below:     Daytime  First call: \"General surgery intern inpatients\"  Second call: \"General surgery resident consult\"  Third call: \"Chief resident general surgery\"    Nights  First call: \"General intern inpatients nights/wknd/hol\"  Second call: \"General surgery resident consult\"  Third call: Staff surgeon       "

## 2024-07-14 NOTE — PROVIDER NOTIFICATION
ED 37, MP  Hi,  Patient still requesting pain medication for 10/10 headache.  Also, FYI, patient has glass in bottom of right foot that needs to be removed.  Thank you,  Shabnam Elena RN   [phone]

## 2024-07-14 NOTE — PROGRESS NOTES
"Primary team saw patient this morning and put in an order for NG tube removal, patient pull out NG tube before RN could remove it.. Pt. Is insisting to go home and is refusing care.Team made aware.    /81 (BP Location: Left arm, Patient Position: Supine, Cuff Size: Adult Regular)   Pulse 79   Temp 98.3  F (36.8  C) (Oral)   Resp 18   Ht 1.626 m (5' 4\")   SpO2 99%   BMI 26.61 kg/m     "

## 2024-07-14 NOTE — PROVIDER NOTIFICATION
ED 37, MP  Just an FYI, patient has NG tube placed, but has refused the gastrografin tonight.      Thank you,  Shabnam Elena, RN  [phone]

## 2024-07-14 NOTE — H&P
[facility]    History and Physical - Hospitalist Service, GOLD TEAM        Date of Admission:  7/13/2024    Assessment & Plan      Andrew Collier is a 58 year old male admitted on 7/13/2024. He has a past medical history of HLD, DM Type II, HIV, depression, anxiety, and polysubstance abuse who is admitted with recurrent SBO.    Partial Small Bowel Obstruction. Presented with 24 hours of LLQ abdominal pain, nausea/vomiting, and decreased BM's. Known hx of recurrent SBO's. CT abd/pelvis revealed partially obstructed small bowel with partially decompressed small bowel distal to transition point. Surgery consulted, recommend conservative management with NPO status and NG tube placement. Patient noting symptomatic improvement after NG tube placed.  - NPO status  - IV NS @ 100 ml/hour  - NG tube to LIS  - IV pantoprazole 40 mg daily  - PRN zofran for nausea  - Minimize use of narcotics  - Goal K >4, Mg >2, Phos >3 - replete as needed  - General surgery following, appreciate assistance  - Gastrografin challenge ordered for AM per surgery    HIV. Diagnosed in 2015, has previously received treatment with Triumeq and Genvoya although is now on Biktarvy. Has had issues with compliance in the past, reports he frequently misses doses. Most recent CD4 count 534, HIV RNA quant of 170.  - Continue Biktarvy daily    Poorly Controlled DM Type II. Most recent HgbA1C 13.4% on 2/6/24. Maintained on lantus 30 units QPM and glipizide although reports inconsistent compliance.  - Will order lantus 18 units at bedtime in setting of NPO status  - Medium sliding scale insulin  - Glucose checks q4h while NPO  - Hypoglycemia protocol  - Updated HgbA1C ordered    Bladder Wall Thickening. Incidentally noted on CT. Patient denies dysuria, incomplete emptying.  - UA ordered    Hx of Polysubstance Abuse. Has previously used IV methamphetamines. Currently reports last use 3 years ago, although per  hospitalization 11/2023 endorsed using once/month at that time.  - Consider addiction medicine consult    Diet: NPO for Medical/Clinical Reasons Except for: No Exceptions  DVT Prophylaxis: Pneumatic Compression Devices  Potts Catheter: Not present  Lines: None     Cardiac Monitoring: None  Code Status: Full Code    Clinically Significant Risk Factors Present on Admission           # Hypercalcemia: Highest Ca = 10.4 mg/dL in last 2 days, will monitor as appropriate                     # DMII: A1C = N/A within past 6 months         # Financial/Environmental Concerns:           Disposition Plan     Medically Ready for Discharge: Anticipated in 2-4 Days         The patient's care was discussed with the Attending Physician, Dr. Hoyos and Patient.    Yulissa Crouch PA-C  Hospitalist Service, [facility]  Securely message with [employer] (more info)  Text page via [facility] Paging/Directory   See signed in provider for up to date coverage information    ______________________________________________________________________    Chief Complaint   Abdominal pain, N/V    History is obtained from the patient and EMR.    History of Present Illness   Andrew Collier is a 58 year old male who has a past medical history of HLD, DM type II, HIV, depression, anxiety, and polysubstance abuse who is admitted with partial SBO. Patient has history of recurrent SBO's, most recently hospitalized 11/2023 and managed conservatively with improvement. He presented to ED with complaints of 24 hours of left lower abdominal pain and nausea/vomiting as well as decrease in BM's. Labs revealed mild leukocytosis with WBC 11.2, electrolytes WNL with Na 136 and K 4.1, renal function at baseline with Cr 0.81. Lipase normal. CT revealed partially obstructed small bowel with partially decompressed small bowel distal to transition point in mid abdomen. General surgery was consulted with  "recommendation for conservative management with NG tube placement. Patient was admitted for further management.    Currently, patient reports notable improvement in his abdominal pain and nausea since NG tube was placed. He had been at his baseline health status prior to symptom onset yesterday morning. Reports last BM was yesterday and has not passed flatus since. He otherwise denies fevers/chills, chest pain, SOB, dysuria. He states he is currently living in an apartment and is \"hit or miss\" with taking his medications. He notes his glucose levels have been \"high\" and he occasionally forgets his insulin. Denies any drug use x 3 years.    Past Medical History    Past Medical History:   Diagnosis Date    Acute appendicitis with localized peritonitis 1/31/2018    AIDS (H)     Allergic rhinitis due to other allergen     DNS    Anal dysplasia     Chronic abdominal pain     CNS toxoplasmosis (H)     COVID-19 1/11/2022    Diabetes type 2, controlled (H)     GERD (gastroesophageal reflux disease)     Herpes zoster 9/23/2016    History of seizure     History of substance abuse (H)     HIV (human immunodeficiency virus infection) (H)     HLD (hyperlipidemia)     Lung nodules     Periungual wart     Pneumonia 1/6/2019    PTSD (post-traumatic stress disorder)     Sleep apnea     doesn't use CPAP       Past Surgical History   Past Surgical History:   Procedure Laterality Date    ANOSCOPY N/A 9/9/2020    Procedure: Exam Under Anesthesia, ANOSCOPY, fulgeration of rectal fissures with Rectal Biopsies;  Surgeon: Thanh Lundberg MD;  Location: [facility] OR    COLONOSCOPY Left 1/22/2016    Procedure: COMBINED COLONOSCOPY, SINGLE OR MULTIPLE BIOPSY/POLYPECTOMY BY BIOPSY;  Surgeon: Clark Saini MD;  Location: [facility] GI    ESOPHAGOSCOPY, GASTROSCOPY, DUODENOSCOPY (EGD), COMBINED N/A 6/6/2022    Procedure: ESOPHAGOGASTRODUODENOSCOPY, WITH BIOPSY;  Surgeon: Kecia Benítez MD;  Location: [facility] GI    HC EXPLORE UNDESC TESTIS,INGUIN/SCROTAL   "    LAPAROSCOPIC APPENDECTOMY N/A 2018    Procedure: LAPAROSCOPIC APPENDECTOMY;  LAPAROSCOPIC APPENDECTOMY;  Surgeon: Dawn Holt MD;  Location: [facility] OR    LAPAROSCOPY DIAGNOSTIC (GENERAL) N/A 2016    Procedure: LAPAROSCOPY DIAGNOSTIC (GENERAL);  Surgeon: Susannah Arriaga MD;  Location: [facility]    LAPAROSCOPY DIAGNOSTIC (GENERAL) N/A 2018    Procedure: LAPAROSCOPY DIAGNOSTIC (GENERAL);  Diagnostic laparoscopy and lysis of adhesions;  Surgeon: Prince Dowling MD;  Location: [facility] OR    OPTICAL TRACKING SYSTEM CRANIOTOMY, EXCISE TUMOR, COMBINED Left 4/10/2015    Procedure: COMBINED OPTICAL TRACKING SYSTEM CRANIOTOMY, EXCISE TUMOR;  Surgeon: Mirlande Colmenares MD;  Location: [facility] OR    REPAIR GAMEKEEPER'S THUMB Right 2016    Procedure: REPAIR LIGAMENT ULNAR COLLATERAL THUMB (GAMEKEEPER'S);  Surgeon: Evin Zamorano MD;  Location: [facility] OR    ZZC NONSPECIFIC PROCEDURE      right forearm fracture       Prior to Admission Medications   Prior to Admission Medications   Prescriptions Last Dose Informant Patient Reported? Taking?   Continuous Blood Gluc Sensor (DEXCOM G6 SENSOR) MISC   No No   Si each every 10 days Change every 10 days.   Continuous Blood Gluc Transmit (DEXCOM G6 TRANSMITTER) MISC   No No   Si each every 3 months Change every 3 months.   Nutritional Supplements (ENSURE ACTIVE) LIQD   No No   Sig: Take 414 mLs by mouth daily   alum & mag hydroxide-simethicone (MAALOX) 200-200-20 MG/5ML SUSP suspension   No No   Sig: Take 15 mLs by mouth 3 times daily as needed for indigestion   bictegravir-emtricitabine-tenofovir (BIKTARVY) -25 MG per tablet   No No   Sig: Take 1 tablet by mouth daily   blood glucose (NO BRAND SPECIFIED) lancets standard   No No   Sig: Use to test blood sugar 1 time daily or as directed.   blood glucose (NO BRAND SPECIFIED) test strip   No No   Sig: Use to test blood sugar 1 time daily or as directed.   blood glucose monitoring (NO BRAND  SPECIFIED) meter device kit   No No   Sig: Use to test blood sugar 3 times daily or as directed.   famotidine (PEPCID) 40 MG tablet   No No   Sig: Take 1 tablet (40 mg) by mouth daily   glipiZIDE (GLUCOTROL XL) 10 MG 24 hr tablet   No No   Sig: Take 1 tablet (10 mg) by mouth daily   insulin glargine (LANTUS PEN) 100 UNIT/ML pen   No No   Sig: Inject 30 Units Subcutaneous at bedtime   insulin pen needle (32G X 4 MM) 32G X 4 MM miscellaneous   No No   Sig: Use 4-5 pen needles daily or as directed.   polyethylene glycol (MIRALAX) 17 GM/Dose powder   No No   Sig: Take 17 g by mouth 2 times daily   senna-docusate (SENOKOT-S/PERICOLACE) 8.6-50 MG tablet   No No   Sig: Take 1-2 tablets by mouth 2 times daily as needed for constipation      Facility-Administered Medications: None        Review of Systems    The 10 point Review of Systems is negative other than noted in the HPI or here.     Physical Exam   Vital Signs: Temp: 99.3  F (37.4  C) Temp src: Oral BP: (!) 141/90 Pulse: 105   Resp: 18 SpO2: 100 % O2 Device: None (Room air)    Weight: 0 lbs 0 oz    General Appearance: Well appearing male resting comfortably in bed, in no apparent distress.  Eyes: Anicteric sclera. Conjunctiva clear.  HEENT: NC/AT. Moist mucous membranes.  Respiratory: Respiratory effort normal on room air. Lungs CTAB without rales, rhonchi, or wheezing.  Cardiovascular: RRR, S1/S2. No murmurs evident.  GI: Soft, mildly distended, non-tender. Bowel sounds normoactive.  Skin: Quarter-sized scabbed lesion along left anterior knee. No surrounding erythema or warmth. Otherwise no jaundice, rashes, or lesions evident on exposed skin.  Musculoskeletal/Ext: No peripheral edema.  Neurologic: A&O x 3. Passive movement of all extremities, no focal deficits.  Psychiatric: Pleasant affect. Responds appropriately to questions.    Medical Decision Making       55 MINUTES SPENT BY ME on the date of service doing chart review, history, exam, documentation & further  activities per the note.      Data     I have personally reviewed the following data over the past 24 hrs:    11.2 (H)  \   15.6   / 431     136 99 18.0 /  323 (H)   4.1 26 0.81 \     ALT: 18 AST: 18 AP: 129 TBILI: 0.8   ALB: 4.4 TOT PROTEIN: 8.2 LIPASE: 34     Trop: 11 BNP: N/A     Procal: N/A CRP: N/A Lactic Acid: 1.5         Imaging results reviewed over the past 24 hrs:   Recent Results (from the past 24 hour(s))   XR Foot Right G/E 3 Views    Narrative    EXAM: XR FOOT RIGHT G/E 3 VIEWS  LOCATION: [facility]  DATE: 7/13/2024    INDICATION: Evaluate for possible foreign body to plantar foot; wound present with pain; patient suspects he stepped on a piece of glass  COMPARISON: 6/20/2023      Impression    IMPRESSION: Normal joint spaces and alignment. No acute fracture. No radiopaque foreign body.   CT Abdomen Pelvis w Contrast    Narrative    EXAMINATION: CT ABDOMEN PELVIS W CONTRAST  7/13/2024 4:37 PM      CLINICAL HISTORY: LLQ abdominal pain, nausea, vomiting    COMPARISON: 11/19/2023    PROCEDURE COMMENTS: CT of the abdomen was performed with  [id number]:95mL intravenous contrast. Coronal and sagittal reformatted  images were obtained.    FINDINGS:  Lower thorax:   Mild bibasilar atelectasis. No pleural effusion.    Abdomen and pelvis:  The liver and biliary system, spleen, and pancreas are normal in  appearance. Focal fatty infiltration of the liver along the falciform  ligament. Cholelithiasis without evidence of cholecystitis.    The adrenal glands and kidneys are normal in appearance.    There are dilated loops of small bowel measuring up to 4.1 cm. Area of  partial obstruction is seen on image 296 of series 4 with partially  decompressed bowel distally to this transition point. There is fecal  material and air within the large bowel. There is no free air or free  fluid. Diffuse diverticulosis without evidence of diverticulitis.  Diffuse wall thickening of the  imaged esophagus. There are no  abnormally sized lymph nodes.    Penile prosthesis. Prostatomegaly. Diffuse wall thickening of the  urinary bladder.    Osseous structures:   Degenerative changes of the lumbar and thoracic spine with probable  chronic compression deformities of T8 and T9.      Impression    IMPRESSION:  1. Partially obstructed small bowel with partially decompressed small  bowel distal to transition point in the mid abdomen.  2. Diffuse wall thickening of the visualized esophagus suggesting  esophagitis, possibly related to reported vomiting.  3. Diffuse wall thickening of the urinary bladder may indicate chronic  outlet obstruction versus cystitis..  4. Cholelithiasis without evidence cholecystitis.    I have personally reviewed the examination and initial interpretation  and I agree with the findings.    ASH KAY DO         SYSTEM ID:  [id number]   XR Abdomen Port 1 View    Narrative    EXAM: XR ABDOMEN PORT 1 VIEW  LOCATION: [facility]  DATE: 7/13/2024    INDICATION: Confirm NG tube placement  COMPARISON: 11/8/2023       Impression    IMPRESSION: NG tube tip and sidehole in the stomach. Urinary excretion of IV contrast. No change from prior.

## 2024-07-14 NOTE — PLAN OF CARE
"Goal Outcome Evaluation:    Neuro: A&Ox4.   Cardiac: SR. VSS.   Respiratory: Sating 100 on RA.  GI/: c/o abdominal pain, N/V X2 episodes this shift. Prn Zofran administered  Diet/appetite: Diet advanced to full liquid, but patient was not able to tolerate. Diet updated to clears  Activity:  Up independently.  Pain: C/O 10/10 abd pain, Nothing ordered at this time. Primary team paged  Skin: No new deficits noted.  LDA's:     Plan: Continue with POC. Notify primary team with changes.       /75 (BP Location: Left arm, Patient Position: Right side, Cuff Size: Adult Regular)   Pulse 85   Temp 98.2  F (36.8  C) (Oral)   Resp 20   Ht 1.626 m (5' 4\")   SpO2 100%   BMI 26.61 kg/m     "

## 2024-07-14 NOTE — PROVIDER NOTIFICATION
Sent to different provider due to no response:  ED 37, MP  Patient reporting 10/10 headache and would like something for pain.  Also just an FYI, patient has glass in bottom of foot, wants removed. Please advise. Thank you,  Shabnam Elena, RN  [phone]

## 2024-07-14 NOTE — PROVIDER NOTIFICATION
ED 37, MP  Patient reporting 10/10 headache, would like something more for pain.  Please advise.    Thank you,  Shabnam Elena RN  [phone]

## 2024-07-14 NOTE — PROGRESS NOTES
[facility]    Medicine Progress Note - Hospitalist Service, GOLD TEAM 9    Date of Admission:  7/13/2024    Assessment & Plan     Andrew Collier is a 58 year old male admitted on 7/13/2024. He has a past medical history of HLD, DM Type II, HIV, depression, anxiety, and polysubstance abuse who is admitted with recurrent SBO.    Today's Plan:  - seen post NG removal and GG challenge  - surgery team paged    > IV Dilaudid prn ordered  - await serial Gen surgery re-assessment and cont pain control with IV opiates     Partial Small Bowel Obstruction. Presented with 24 hours of LLQ abdominal pain, nausea/vomiting, and decreased BM's. Known hx of recurrent SBO's. CT abd/pelvis revealed partially obstructed small bowel with partially decompressed small bowel distal to transition point. Surgery consulted, recommend conservative management with NPO status and NG tube placement. Patient noting symptomatic improvement after NG tube placed.  - NPO status maintained  - IV NS @ 100 ml/hour maintained  - NG tube to LIS, was pulled on 7/14  - IV pantoprazole 40 mg daily  - PRN zofran for nausea  - Minimize use of narcotics  - Goal K >4, Mg >2, Phos >3 - replete as needed  - General surgery following, appreciate assistance  - Gastrografin challenge ordered for AM per surgery- recs pending     HIV. Diagnosed in 2015, has previously received treatment with Triumeq and Genvoya although is now on Biktarvy. Has had issues with compliance in the past, reports he frequently misses doses. Most recent CD4 count 534, HIV RNA quant of 170.  - Continue Biktarvy daily     Poorly Controlled DM Type II. Most recent HgbA1C 13.4% on 2/6/24. Maintained on lantus 30 units QPM and glipizide although reports inconsistent compliance.  - Will continue lantus 18 units at bedtime in setting of NPO status  - Medium sliding scale insulin  - Glucose checks q4h while NPO  - Hypoglycemia protocol  - Updated  HgbA1C 11.7, elevated     Bladder Wall Thickening. Incidentally noted on CT. Patient denies dysuria, incomplete emptying.  - UA ordered     Hx of Polysubstance Abuse. Has previously used IV methamphetamines. Currently reports last use 3 years ago, although per hospitalization 11/2023 endorsed using once/month at that time.  - Consider addiction medicine consult          Diet: Advance Diet as Tolerated: Clear Liquid Diet    DVT Prophylaxis: Heparin SQ  Potts Catheter: Not present  Lines: None     Cardiac Monitoring: None  Code Status: Full Code      Clinically Significant Risk Factors Present on Admission           # Hypercalcemia: Highest Ca = 10.4 mg/dL in last 2 days, will monitor as appropriate                   # DMII: A1C = 11.7 % (Ref range: <5.7 %) within past 6 months       # Financial/Environmental Concerns:           Disposition Plan     Medically Ready for Discharge: Anticipated in 2-4 Days     Venu Leal MD  Hospitalist Service, GOLD TEAM 9  [facility]  Securely message with [employer] (more info)  Text page via [facility] Paging/Directory   See signed in provider for up to date coverage information  ______________________________________________________________________    Interval History   Alert, awake, moderate distress due to recurrent R abd pain post NG removal    Back from XR, NG removed and s/p Gastrograffin study - results pending  Pt now with c/o 10/10 R abd pain  Trial only PO popsicle and then the pain started    Physical Exam   Vital Signs: Temp: 98.3  F (36.8  C) Temp src: Oral BP: 121/81 Pulse: 79   Resp: 18 SpO2: 99 % O2 Device: None (Room air)    Weight: 0 lbs 0 oz    General: Alert awake and oriented, moderate distress, conversational  Eyes: Normal pink mucosa with no signs of pallor, no scleral icterus.  Neck: No significant lymphadenopathy, no palpable LN, No JVD  Heart: Regular rate and rhythm, normal S1, normal S2, no murmurs rubs or gallops,  PMI is nondisplaced   Peripherally there is no edema  Lungs: No increased work of breathing, good effort, lungs are clear to auscultation bilaterally   No wheezing or crackles   Breathing on room air  Abdomen: Diffusely tender, nondistended, hypoactive bowel sounds, no palpable masses  Extremities: Normal capillary refill, no edema, no clubbing, no cyanosis  Neuro: Alert and oriented to person place location and situation   Grossly arms and legs are without any focal motor or sensory deficits   Gait was not assessed   Cranial nerves II through XII are grossly intact  Psych: No acute psychosis, good mood, good spirits      Medical Decision Making       52 MINUTES SPENT BY ME on the date of service doing chart review, history, exam, documentation & further activities per the note.   - IV opiates for pain control  - ongoing SBO workup and treatment, as not improved     Data     I have personally reviewed the following data over the past 24 hrs:    9.0  \   14.5   / 322     136 99 18.0 /  184 (H)   4.1 26 0.81 \     ALT: 18 AST: 18 AP: 129 TBILI: 0.8   ALB: 4.4 TOT PROTEIN: 8.2 LIPASE: 34     Trop: 11 BNP: N/A     TSH: N/A T4: N/A A1C: 11.7 (H)     Procal: N/A CRP: N/A Lactic Acid: 1.5         Imaging results reviewed over the past 24 hrs:   Recent Results (from the past 24 hour(s))   XR Foot Right G/E 3 Views    Narrative    EXAM: XR FOOT RIGHT G/E 3 VIEWS  LOCATION: [facility]  DATE: 7/13/2024    INDICATION: Evaluate for possible foreign body to plantar foot; wound present with pain; patient suspects he stepped on a piece of glass  COMPARISON: 6/20/2023      Impression    IMPRESSION: Normal joint spaces and alignment. No acute fracture. No radiopaque foreign body.   CT Abdomen Pelvis w Contrast    Narrative    EXAMINATION: CT ABDOMEN PELVIS W CONTRAST  7/13/2024 4:37 PM      CLINICAL HISTORY: LLQ abdominal pain, nausea, vomiting    COMPARISON: 11/19/2023    PROCEDURE  COMMENTS: CT of the abdomen was performed with  [id number]:95mL intravenous contrast. Coronal and sagittal reformatted  images were obtained.    FINDINGS:  Lower thorax:   Mild bibasilar atelectasis. No pleural effusion.    Abdomen and pelvis:  The liver and biliary system, spleen, and pancreas are normal in  appearance. Focal fatty infiltration of the liver along the falciform  ligament. Cholelithiasis without evidence of cholecystitis.    The adrenal glands and kidneys are normal in appearance.    There are dilated loops of small bowel measuring up to 4.1 cm. Area of  partial obstruction is seen on image 296 of series 4 with partially  decompressed bowel distally to this transition point. There is fecal  material and air within the large bowel. There is no free air or free  fluid. Diffuse diverticulosis without evidence of diverticulitis.  Diffuse wall thickening of the imaged esophagus. There are no  abnormally sized lymph nodes.    Penile prosthesis. Prostatomegaly. Diffuse wall thickening of the  urinary bladder.    Osseous structures:   Degenerative changes of the lumbar and thoracic spine with probable  chronic compression deformities of T8 and T9.      Impression    IMPRESSION:  1. Partially obstructed small bowel with partially decompressed small  bowel distal to transition point in the mid abdomen.  2. Diffuse wall thickening of the visualized esophagus suggesting  esophagitis, possibly related to reported vomiting.  3. Diffuse wall thickening of the urinary bladder may indicate chronic  outlet obstruction versus cystitis..  4. Cholelithiasis without evidence cholecystitis.    I have personally reviewed the examination and initial interpretation  and I agree with the findings.    ASH KAY DO         SYSTEM ID:  [id number]   XR Abdomen Port 1 View    Narrative    EXAM: XR ABDOMEN PORT 1 VIEW  LOCATION: [facility]  DATE: 7/13/2024    INDICATION: Confirm NG tube  placement  COMPARISON: 11/8/2023       Impression    IMPRESSION: NG tube tip and sidehole in the stomach. Urinary excretion of IV contrast. No change from prior.

## 2024-07-14 NOTE — CONSULTS
[facility]    Consult Note - Emergency General Surgery Service  Date of Admission:  7/13/2024  Consult Requested by: ED provider  Reason for Consult: SBO    Assessment & Plan: Surgery   Andrew Collier is a 58 year old male admitted with a history of HIV and previous SBOs who presents with vomiting and abdominal pain, found to have a partial SBO on CT scan. Vital signs within normal limits. WBC 11.2. No peritonitis on exam.     He has been successfully managed with conservative therapy in the past and is considered a high surgical risk candidate given multiple past surgeries and immunosuppression.     - Recommend conservative management of SBO:      - NPO     - NG tube to LIS      - IVFs     - Replete electrolytes (goal K>4, Mg>2, Phos>3),      - Encourage ambulation     - Minimize narcotics and anti-cholinergics     - Will order gastrograffin challenge       Drains: None     Code Status:  Full    Clinically Significant Risk Factors Present on Admission           # Hypercalcemia: Highest Ca = 10.4 mg/dL in last 2 days, will monitor as appropriate                   # DMII: A1C = N/A within past 6 months       # Financial/Environmental Concerns:         The patient's care was discussed with the Attending Physician, Dr. Armenta and Chief Resident/Fellow.    Maranda Atkinson MD  [facility]  Non-urgent messages: Securely message with [employer] (more info)  Text page via [employer] Paging/Directory     ______________________________________________________________________    Chief Complaint   Abdominal pain, nausea/vomiting    History is obtained from the patient    History of Present Illness   Andrew Collier is a 58 year old male with a history of multiple previous SBOs (requiring surgery previously though more recently managed medically), HIV, and DM2 who presents with abdominal pain, nausea and vomiting. He states  that this feels similar to his previous bowel obstructions. He has had nausea and at least 7 episodes of emesis today. No blood in emesis. Last bowel movement was yesterday morning. Last flatus also yesterday morning. Pain is constant and band-like over his lower abdomen. He also endorses heart burn.      Past Medical History    Past Medical History:   Diagnosis Date    Acute appendicitis with localized peritonitis 1/31/2018    AIDS (H)     Allergic rhinitis due to other allergen     DNS    Anal dysplasia     Chronic abdominal pain     CNS toxoplasmosis (H)     COVID-19 1/11/2022    Diabetes type 2, controlled (H)     GERD (gastroesophageal reflux disease)     Herpes zoster 9/23/2016    History of seizure     History of substance abuse (H)     HIV (human immunodeficiency virus infection) (H)     HLD (hyperlipidemia)     Lung nodules     Periungual wart     Pneumonia 1/6/2019    PTSD (post-traumatic stress disorder)     Sleep apnea     doesn't use CPAP       Past Surgical History   Past Surgical History:   Procedure Laterality Date    ANOSCOPY N/A 9/9/2020    Procedure: Exam Under Anesthesia, ANOSCOPY, fulgeration of rectal fissures with Rectal Biopsies;  Surgeon: Thanh Lundberg MD;  Location: [facility] OR    COLONOSCOPY Left 1/22/2016    Procedure: COMBINED COLONOSCOPY, SINGLE OR MULTIPLE BIOPSY/POLYPECTOMY BY BIOPSY;  Surgeon: Clark Saini MD;  Location: [facility] GI    ESOPHAGOSCOPY, GASTROSCOPY, DUODENOSCOPY (EGD), COMBINED N/A 6/6/2022    Procedure: ESOPHAGOGASTRODUODENOSCOPY, WITH BIOPSY;  Surgeon: Kecia Benítez MD;  Location: [facility] GI    HC EXPLORE UNDESC TESTIS,INGUIN/SCROTAL      LAPAROSCOPIC APPENDECTOMY N/A 1/31/2018    Procedure: LAPAROSCOPIC APPENDECTOMY;  LAPAROSCOPIC APPENDECTOMY;  Surgeon: Dawn Holt MD;  Location: [facility] OR    LAPAROSCOPY DIAGNOSTIC (GENERAL) N/A 7/26/2016    Procedure: LAPAROSCOPY DIAGNOSTIC (GENERAL);  Surgeon: Susannah Arriaga MD;  Location: [facility] OR    LAPAROSCOPY  DIAGNOSTIC (GENERAL) N/A 4/16/2018    Procedure: LAPAROSCOPY DIAGNOSTIC (GENERAL);  Diagnostic laparoscopy and lysis of adhesions;  Surgeon: Prince Dowling MD;  Location: [facility] OR    OPTICAL TRACKING SYSTEM CRANIOTOMY, EXCISE TUMOR, COMBINED Left 4/10/2015    Procedure: COMBINED OPTICAL TRACKING SYSTEM CRANIOTOMY, EXCISE TUMOR;  Surgeon: Mirlande Colmenares MD;  Location: [facility] OR    REPAIR GAMEKEEPER'S THUMB Right 12/2/2016    Procedure: REPAIR LIGAMENT ULNAR COLLATERAL THUMB (GAMEKEEPER'S);  Surgeon: Evin Zamorano MD;  Location:  OR    [facility] NONSPECIFIC PROCEDURE      right forearm fracture   Appendectomy  Diagnostic laparoscopy and ANASTASIIA 2018 with Dr. Dowling    Prior to Admission Medications   I have reviewed this patient's current medications  On biktarvy. No blood thinners.      Social History   I have reviewed this patient's social history and updated it with pertinent information if needed.  Social History     Tobacco Use    Smoking status: Some Days     Current packs/day: 0.25     Average packs/day: 0.3 packs/day for 40.0 years (10.0 ttl pk-yrs)     Types: Cigarettes    Smokeless tobacco: Former   Substance Use Topics    Alcohol use: No     Alcohol/week: 0.0 standard drinks of alcohol     Comment: Last etoh in 2007    Drug use: Not Currently     Types: Marijuana, Methamphetamines    Smokes 1 pack cigarettes per week, does not drink alcohol, no THC, no illicit drugs (3 years sober from methamphetamines)    Physical Exam   Vital Signs: Temp: 98.4  F (36.9  C) Temp src: Oral BP: 129/81 Pulse: 105   Resp: 18 SpO2: 98 % O2 Device: None (Room air)    Weight: 0 lbs 0 ozNo intake or output data in the 24 hours ending 07/13/24 1940    Gen: Non-toxic appearing, no acute distress  HEENT: Atraumatic, normocephalic  Neuro: Alert and oriented. No gross neurologic deficits   Pulm: nonlabored breathing, comfortable on room air, no cough  CV: RRR by peripheral pulse, noncyanotic   ABD: Soft, mildly distended, minimally  tender to deep palpation in RLQ and LLQ, No guarding or rebound  MSK:  Normal active range of motion, no edema  Skin: Warm, dry, no rashes or abrasions on exposed skin  Psych: Cooperative, appropriate mood and affect        Data     I have personally reviewed the following data over the past 24 hrs:    11.2 (H)  \   15.6   / 431     136 99 18.0 /  323 (H)   4.1 26 0.81 \     ALT: 18 AST: 18 AP: 129 TBILI: 0.8   ALB: 4.4 TOT PROTEIN: 8.2 LIPASE: 34     Trop: 11 BNP: N/A     Procal: N/A CRP: N/A Lactic Acid: 1.5         Imaging results reviewed over the past 24 hrs:   Recent Results (from the past 24 hour(s))   XR Foot Right G/E 3 Views    Narrative    EXAM: XR FOOT RIGHT G/E 3 VIEWS  LOCATION: [facility]  DATE: 7/13/2024    INDICATION: Evaluate for possible foreign body to plantar foot; wound present with pain; patient suspects he stepped on a piece of glass  COMPARISON: 6/20/2023      Impression    IMPRESSION: Normal joint spaces and alignment. No acute fracture. No radiopaque foreign body.   CT Abdomen Pelvis w Contrast    Narrative    EXAMINATION: CT ABDOMEN PELVIS W CONTRAST  7/13/2024 4:37 PM      CLINICAL HISTORY: LLQ abdominal pain, nausea, vomiting    COMPARISON: 11/19/2023    PROCEDURE COMMENTS: CT of the abdomen was performed with  [id number]:95mL intravenous contrast. Coronal and sagittal reformatted  images were obtained.    FINDINGS:  Lower thorax:   Mild bibasilar atelectasis. No pleural effusion.    Abdomen and pelvis:  The liver and biliary system, spleen, and pancreas are normal in  appearance. Focal fatty infiltration of the liver along the falciform  ligament. Cholelithiasis without evidence of cholecystitis.    The adrenal glands and kidneys are normal in appearance.    There are dilated loops of small bowel measuring up to 4.1 cm. Area of  partial obstruction is seen on image 296 of series 4 with partially  decompressed bowel distally to this  transition point. There is fecal  material and air within the large bowel. There is no free air or free  fluid. Diffuse diverticulosis without evidence of diverticulitis.  Diffuse wall thickening of the imaged esophagus. There are no  abnormally sized lymph nodes.    Penile prosthesis. Prostatomegaly. Diffuse wall thickening of the  urinary bladder.    Osseous structures:   Degenerative changes of the lumbar and thoracic spine with probable  chronic compression deformities of T8 and T9.      Impression    IMPRESSION:  1. Partially obstructed small bowel with partially decompressed small  bowel distal to transition point in the mid abdomen.  2. Diffuse wall thickening of the visualized esophagus suggesting  esophagitis, possibly related to reported vomiting.  3. Diffuse wall thickening of the urinary bladder may indicate chronic  outlet obstruction versus cystitis..  4. Cholelithiasis without evidence cholecystitis.    I have personally reviewed the examination and initial interpretation  and I agree with the findings.    ASH KAY DO         SYSTEM ID:  [id number]

## 2024-07-14 NOTE — PROGRESS NOTES
Paged Gold 9    RE: Patient is c/o pain of 10/10 after eating. 2 episodes of emesis.Offered patient NGT placement, patient declines.

## 2024-07-14 NOTE — PLAN OF CARE
Goal Outcome Evaluation:      Plan of Care Reviewed With: patient    Overall Patient Progress: no changeOverall Patient Progress: no change     Shift:    V/S & Pain: VSS on RA. Pain managed with dilaudid  Neuro: AOx4, calm, but frustrated at times  Respiratory: Stable on RA, lung sounds clear bilaterally  Cardiac: VSS  Skin: No new skin concerns  GI/: Voids spontaneously, SBO suspected, NG tube placed  Nutrition: NPO, some nausea - Zofran given, last  @ 0448  Activity: independent  Labs: monitoring    Events this shift: Patient refused Gastrografin challenge, provider notified.  Pt remains vitally stable on RA. Call light within reach, calls appropriately
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
## 2024-07-15 ENCOUNTER — APPOINTMENT (OUTPATIENT)
Dept: GENERAL RADIOLOGY | Facility: CLINIC | Age: 59
End: 2024-07-15
Attending: HOSPITALIST
Payer: COMMERCIAL

## 2024-07-15 VITALS
HEART RATE: 81 BPM | HEIGHT: 64 IN | TEMPERATURE: 97.9 F | RESPIRATION RATE: 16 BRPM | BODY MASS INDEX: 26.61 KG/M2 | OXYGEN SATURATION: 99 % | DIASTOLIC BLOOD PRESSURE: 79 MMHG | SYSTOLIC BLOOD PRESSURE: 134 MMHG

## 2024-07-15 LAB
ANION GAP SERPL CALCULATED.3IONS-SCNC: 8 MMOL/L (ref 7–15)
BUN SERPL-MCNC: 13.1 MG/DL (ref 8–23)
CALCIUM SERPL-MCNC: 8.4 MG/DL (ref 8.8–10.2)
CHLORIDE SERPL-SCNC: 103 MMOL/L (ref 98–107)
CREAT SERPL-MCNC: 0.63 MG/DL (ref 0.67–1.17)
DEPRECATED HCO3 PLAS-SCNC: 23 MMOL/L (ref 22–29)
EGFRCR SERPLBLD CKD-EPI 2021: >90 ML/MIN/1.73M2
ERYTHROCYTE [DISTWIDTH] IN BLOOD BY AUTOMATED COUNT: 13 % (ref 10–15)
GLUCOSE BLDC GLUCOMTR-MCNC: 173 MG/DL (ref 70–99)
GLUCOSE BLDC GLUCOMTR-MCNC: 176 MG/DL (ref 70–99)
GLUCOSE BLDC GLUCOMTR-MCNC: 181 MG/DL (ref 70–99)
GLUCOSE BLDC GLUCOMTR-MCNC: 217 MG/DL (ref 70–99)
GLUCOSE BLDC GLUCOMTR-MCNC: 305 MG/DL (ref 70–99)
GLUCOSE SERPL-MCNC: 213 MG/DL (ref 70–99)
HCT VFR BLD AUTO: 38.3 % (ref 40–53)
HGB BLD-MCNC: 13.3 G/DL (ref 13.3–17.7)
MAGNESIUM SERPL-MCNC: 1.6 MG/DL (ref 1.7–2.3)
MCH RBC QN AUTO: 30.9 PG (ref 26.5–33)
MCHC RBC AUTO-ENTMCNC: 34.7 G/DL (ref 31.5–36.5)
MCV RBC AUTO: 89 FL (ref 78–100)
PHOSPHATE SERPL-MCNC: 2.4 MG/DL (ref 2.5–4.5)
PLATELET # BLD AUTO: 323 10E3/UL (ref 150–450)
POTASSIUM SERPL-SCNC: 3.6 MMOL/L (ref 3.4–5.3)
RBC # BLD AUTO: 4.31 10E6/UL (ref 4.4–5.9)
SODIUM SERPL-SCNC: 134 MMOL/L (ref 135–145)
WBC # BLD AUTO: 8.1 10E3/UL (ref 4–11)

## 2024-07-15 PROCEDURE — 99238 HOSP IP/OBS DSCHRG MGMT 30/<: CPT | Performed by: HOSPITALIST

## 2024-07-15 PROCEDURE — 74018 RADEX ABDOMEN 1 VIEW: CPT | Mod: 76

## 2024-07-15 PROCEDURE — 258N000003 HC RX IP 258 OP 636: Performed by: PHYSICIAN ASSISTANT

## 2024-07-15 PROCEDURE — 74018 RADEX ABDOMEN 1 VIEW: CPT | Mod: 26 | Performed by: STUDENT IN AN ORGANIZED HEALTH CARE EDUCATION/TRAINING PROGRAM

## 2024-07-15 PROCEDURE — 85027 COMPLETE CBC AUTOMATED: CPT | Performed by: HOSPITALIST

## 2024-07-15 PROCEDURE — 82962 GLUCOSE BLOOD TEST: CPT

## 2024-07-15 PROCEDURE — 84100 ASSAY OF PHOSPHORUS: CPT | Performed by: HOSPITALIST

## 2024-07-15 PROCEDURE — 83735 ASSAY OF MAGNESIUM: CPT | Performed by: HOSPITALIST

## 2024-07-15 PROCEDURE — 250N000011 HC RX IP 250 OP 636: Performed by: HOSPITALIST

## 2024-07-15 PROCEDURE — 250N000011 HC RX IP 250 OP 636: Performed by: PHYSICIAN ASSISTANT

## 2024-07-15 PROCEDURE — 80048 BASIC METABOLIC PNL TOTAL CA: CPT | Performed by: HOSPITALIST

## 2024-07-15 PROCEDURE — 250N000013 HC RX MED GY IP 250 OP 250 PS 637: Performed by: PHYSICIAN ASSISTANT

## 2024-07-15 PROCEDURE — 99207 PR NO BILLABLE SERVICE THIS VISIT: CPT | Performed by: HOSPITALIST

## 2024-07-15 PROCEDURE — 74018 RADEX ABDOMEN 1 VIEW: CPT

## 2024-07-15 PROCEDURE — 36415 COLL VENOUS BLD VENIPUNCTURE: CPT | Performed by: HOSPITALIST

## 2024-07-15 PROCEDURE — 99231 SBSQ HOSP IP/OBS SF/LOW 25: CPT | Mod: GC | Performed by: SURGERY

## 2024-07-15 RX ORDER — HYDROMORPHONE HCL IN WATER/PF 6 MG/30 ML
0.2 PATIENT CONTROLLED ANALGESIA SYRINGE INTRAVENOUS EVERY 4 HOURS PRN
Status: DISCONTINUED | OUTPATIENT
Start: 2024-07-15 | End: 2024-07-15 | Stop reason: HOSPADM

## 2024-07-15 RX ORDER — HYDROMORPHONE HCL IN WATER/PF 6 MG/30 ML
0.2 PATIENT CONTROLLED ANALGESIA SYRINGE INTRAVENOUS ONCE
Status: COMPLETED | OUTPATIENT
Start: 2024-07-15 | End: 2024-07-15

## 2024-07-15 RX ADMIN — INSULIN ASPART 2 UNITS: 100 INJECTION, SOLUTION INTRAVENOUS; SUBCUTANEOUS at 05:23

## 2024-07-15 RX ADMIN — HYDROMORPHONE HYDROCHLORIDE 0.2 MG: 0.2 INJECTION, SOLUTION INTRAMUSCULAR; INTRAVENOUS; SUBCUTANEOUS at 05:32

## 2024-07-15 RX ADMIN — INSULIN ASPART 1 UNITS: 100 INJECTION, SOLUTION INTRAVENOUS; SUBCUTANEOUS at 01:05

## 2024-07-15 RX ADMIN — PANTOPRAZOLE SODIUM 40 MG: 40 INJECTION, POWDER, FOR SOLUTION INTRAVENOUS at 09:11

## 2024-07-15 RX ADMIN — INSULIN ASPART 1 UNITS: 100 INJECTION, SOLUTION INTRAVENOUS; SUBCUTANEOUS at 12:17

## 2024-07-15 RX ADMIN — INSULIN ASPART 1 UNITS: 100 INJECTION, SOLUTION INTRAVENOUS; SUBCUTANEOUS at 09:28

## 2024-07-15 RX ADMIN — HEPARIN SODIUM 5000 UNITS: 5000 INJECTION, SOLUTION INTRAVENOUS; SUBCUTANEOUS at 12:00

## 2024-07-15 RX ADMIN — HEPARIN SODIUM 5000 UNITS: 5000 INJECTION, SOLUTION INTRAVENOUS; SUBCUTANEOUS at 03:53

## 2024-07-15 RX ADMIN — HYDROMORPHONE HYDROCHLORIDE 0.2 MG: 0.2 INJECTION, SOLUTION INTRAMUSCULAR; INTRAVENOUS; SUBCUTANEOUS at 10:04

## 2024-07-15 RX ADMIN — SODIUM CHLORIDE: 9 INJECTION, SOLUTION INTRAVENOUS at 09:11

## 2024-07-15 RX ADMIN — SODIUM CHLORIDE: 9 INJECTION, SOLUTION INTRAVENOUS at 18:41

## 2024-07-15 RX ADMIN — BICTEGRAVIR SODIUM, EMTRICITABINE, AND TENOFOVIR ALAFENAMIDE FUMARATE 1 TABLET: 50; 200; 25 TABLET ORAL at 09:11

## 2024-07-15 RX ADMIN — INSULIN ASPART 4 UNITS: 100 INJECTION, SOLUTION INTRAVENOUS; SUBCUTANEOUS at 17:07

## 2024-07-15 ASSESSMENT — ACTIVITIES OF DAILY LIVING (ADL)
ADLS_ACUITY_SCORE: 37

## 2024-07-15 NOTE — PROVIDER NOTIFICATION
ED 37, MP  Patient reporting 9/10 abdominal pain, requesting pain medication.  Please advise.  Thank you,  Shabnam Elena RN  [phone]

## 2024-07-15 NOTE — CONSULTS
Care Management Initial Consult    General Information  Assessment completed with: Andrew Zamudio  Type of CM/SW Visit: Initial Assessment  Primary Care Provider verified and updated as needed: No (No PCP, pt open to CCRC task)   Readmission within the last 30 days: no previous admission in last 30 days   Reason for Consult: discharge planning, utilization management concerns  Advance Care Planning: Advance Care Planning Reviewed: no concerns identified  Patient says he has a HCD, his HCA being someone named Simon. Pt was getting frustrated with SW asking questions about a HCD and ask SW to leave room.       Communication Assessment  Patient's communication style: spoken language (English or Bilingual)           Cognitive  Cognitive/Neuro/Behavioral: WDL                      Living Environment:   People in home: alone     Current living Arrangements: apartment      Able to return to prior arrangements: yes       Family/Social Support:  Care provided by: self  Provides care for: no one  Marital Status: Single  Other (specify) (unable to assess)          Description of Support System: Other (see comments) (unable to assess)    Support Assessment: Patient communicates needs well met    Current Resources:   Patient receiving home care services: No  Community Resources: None  Equipment currently used at home: none  Supplies currently used at home: Diabetic Supplies    Employment/Financial:  Employment Status: disabled     Financial Concerns: none   Referral to Financial Worker: No       Does the patient's insurance plan have a 3 day qualifying hospital stay waiver?  No    Lifestyle & Psychosocial Needs:  Social Determinants of Health     Food Insecurity: Not on file   Depression: Not at risk (8/27/2020)    PHQ-2     PHQ-2 Score: 0   Housing Stability: Not on file   Tobacco Use: High Risk (5/18/2024)    Patient History     Smoking Tobacco Use: Some Days     Smokeless Tobacco Use: Former     Passive Exposure: Not on file    Financial Resource Strain: High Risk (12/28/2021)    Received from [employer] [facility] [employer] [facility], [facility]    Financial Resource Strain     Difficulty of Paying Living Expenses: Not on file     Difficulty of Paying Living Expenses: Not on file   Alcohol Use: Unknown (8/27/2020)    AUDIT-C     Frequency of Alcohol Consumption: Not on file     Average Number of Drinks: Patient declined     Frequency of Binge Drinking: Not on file   Transportation Needs: Not on file   Physical Activity: Not on file   Interpersonal Safety: Not on file   Stress: Not on file   Social Connections: Unknown (4/10/2023)    Received from [employer] [facility]    Social Connections     Frequency of Communication with Friends and Family: Not on file   Health Literacy: Not on file       Functional Status:  Prior to admission patient needed assistance:   Dependent ADLs:: Independent  Dependent IADLs:: Transportation  Assesssment of Functional Status: Needs assistance with transportation    Mental Health Status:  Mental Health Status: Other (see comment) (unable to assess)       Chemical Dependency Status:  Chemical Dependency Status: Other (see comment) (unable to assess)             Values/Beliefs:  Spiritual, Cultural Beliefs, Anabaptism Practices, Values that affect care: no               Additional Information:    SW met with patient at bedside to complete Care Management Assessment due to elevated risk score. Patient was initially agreeable to meeting with . SW introduced self, role, and purpose of assessment.    Patient tells SW he lives alone in an apartment in [place]. He says he is not connected to any services or  in his community. He reports being independent will all I/ADLs except transportation, and says he uses public transportation or walks where he needs to go. Patient tells DAYNA he  "does have diabetes supplies at home but per chart review: \"is \"hit or miss\" with taking his medications. He notes his glucose levels have been \"high\" and he occasionally forgets his insulin.\"    SW inquired about patient's HCD; patient mumbled something about it being \"Simon\" but he \"doesn't know if he works there anymore.\" SW clarified what a HCD was; patient began to get frustrated and told SW that he no longer wanted to talk. SW could not ask patient about any MH or TOMMY concerns, nor get more information on patient's support system. Patient was open to getting a PCP, so CCRC task initiated for this.    SW to remain available for discharge planning and supports.    Alona Lee, MSW  7/14/2024       Social Work and Care Management Department       SEARCHABLE in [employer] - search SOCIAL WORK       [facility] (0800 - 1630) Saturday and Sunday     Units: 4A Vocera, 4C Vocera, & 4E Vocera        Units: 5A 2700-7438 Vocera, 5A 6232-7349 Vocera , BMT SW 1 BMT SW 2, BMT SW 3 & BMT SW 4  5C Off Service 5401 - 5416  5C Off Service 4987-3545     Units: 6A Vocera & 6B Vocera      Units: 6C Vocera     Units: 7A Vocera & 7B Vocera      Units: 7C Med Surg 7401 thru 7418 and 7C Med Surg 7502 thru 7521      Unit: [facility] ED Vocera & [facility] Obs Vocera     [facility] (5310-0344) Saturday and Sunday      Units: 5 Ortho Vocera, 5 Med Surg Vocera & WB ED Vocera     Units: 6 Med Surg Vocera, 8 Med Surg Vocera, & 10 ICU Vocera      After hours Vocera [facility] and After Hours Vocera [facility]     Saturday & Sunday (1630 - 0000)    Mon-Fri (0042-0540)     [facility] Recognized Holidays  (5517-5464)    Units: ALL   - see above VOCERA links to units and after hours     "

## 2024-07-15 NOTE — PROGRESS NOTES
Surgery Progress Note  07/15/2024       Subjective:  Re-engaged by primary team d/t patient's nausea and vomiting after eating. Patient reports that he is passing gas and having bowel movements, most recently had a BM this morning. Some abdominal pain. Vomited 1x. Does not want NG tube.       Objective:  Temp:  [97.9  F (36.6  C)-99.4  F (37.4  C)] 97.9  F (36.6  C)  Pulse:  [81-91] 81  Resp:  [16-18] 16  BP: (124-134)/(79-88) 134/79  SpO2:  [98 %-100 %] 99 %    I/O last 3 completed shifts:  In: 350 [P.O.:350]  Out: 500 [Emesis/NG output:500]      Gen: Awake, alert, NAD  CV: regular rate  Resp: NLB on RA  Abd: soft, nondistended, tender to palpation in LUQ and RLQ, no peritoneal signs  Ext: WWP, no edema     Labs:  Recent Labs   Lab 07/15/24  0609 07/14/24  1107 07/13/24  1517   WBC 8.1 9.0 11.2*   HGB 13.3 14.5 15.6    322 431       Recent Labs   Lab 07/15/24  1209 07/15/24  0922 07/15/24  0609 07/14/24  1328 07/14/24  1107 07/14/24  0009 07/13/24  1517   NA  --   --  134*  --  134*  --  136   POTASSIUM  --   --  3.6  --  4.0  --  4.1   CHLORIDE  --   --  103  --  103  --  99   CO2  --   --  23  --  20*  --  26   BUN  --   --  13.1  --  15.7  --  18.0   CR  --   --  0.63*  --  0.63*  --  0.81   * 181* 213*   < > 200*   < > 323*   LINDA  --   --  8.4*  --  9.3  --  10.4*   MAG  --   --  1.6*  --   --   --  1.8   PHOS  --   --  2.4*  --   --   --  3.3    < > = values in this interval not displayed.       Imaging:    KUB 7/15/2024  IMPRESSION: Compared to radiograph earlier today, increase extent of  air-filled prominent bowel loops with now mildly dilated air-filled  small bowel loop seen in the right central abdomen, and increased air  in the nondilated appearing right colon. Findings may represent  increasing partial obstruction versus adynamic ileus.    KUB 7/15/2024  IMPRESSION: Prominent air-filled bowel loop in the left upper abdomen,  concerning for obstruction/developing ileus.    "  Assessment/Plan:   Andrew Collier is a 58 year old male admitted with a history of HIV and previous SBOs who presents with vomiting and abdominal pain, found to have a partial SBO on CT scan. Vital signs within normal limits. WBC 11.2 on admission. No peritonitis. Pt reports passing flatus and having BM. He has been successfully managed with conservative therapy in the past and is considered a high surgical risk candidate given multiple past surgeries and immunosuppression. Patient refused GGC. Re-engaged by primary team on 7/15 d/t patient's nausea/vomiting and abdominal pain. Exam is benign, patient passing gas and having BM, and patient still refusing NG.    - No surgical intervention  - Continue clear liquids  - If nausea/vomiting again, would recommend NG to LIS and NPO. Would then recommend GGC.          Seen, examined, and discussed with staff.  - - - - - - - - - - - - - - - - - -  Noemi Boswell MD  General Surgery, PGY2       For Questions, please search \"General Surgery [facility]\" in [employer] and use listings as detailed below:     Daytime  First call: \"General surgery intern inpatients\"  Second call: \"General surgery resident consult\"  Third call: \"Chief resident general surgery\"    Nights  First call: \"General intern inpatients nights/wknd/hol\"  Second call: \"General surgery resident consult\"  Third call: Staff surgeon   "

## 2024-07-15 NOTE — PROVIDER NOTIFICATION
ED 37, MP  Patient requesting Dilaudid for reported 10/10 abdominal pain.  Please advise.  Thank you,  Shabnam Elena RN  [phone]

## 2024-07-15 NOTE — PLAN OF CARE
Goal Outcome Evaluation:      Plan of Care Reviewed With: patient    Overall Patient Progress: no changeOverall Patient Progress: no change     Care Management assessment completed due to elevated risk score. CMA to follow.

## 2024-07-15 NOTE — PLAN OF CARE
"Goal Outcome Evaluation:       A/Ox4. C/O abdominal pain, which he describes as constant,  does not appear in any distress, received ordered pain killer with good result, abd. Tender to palpation on Midline and Lt. Lateral area. Positive BS, denied passing gas this morning then he had a large BM, s/b MD abd Xray completed, IV infusing, BG covered with sliding scale insulin. Pt.became upset and requested to go home as he wanted to eat, MD informed another abd. X ray ordered and completed for further investigation. V/S had been stable.   /79 (BP Location: Left arm, Patient Position: Supine, Cuff Size: Adult Regular)   Pulse 81   Temp 97.9  F (36.6  C) (Oral)   Resp 16   Ht 1.626 m (5' 4\")   SpO2 99%   BMI 26.61 kg/m                     "

## 2024-07-15 NOTE — PROGRESS NOTES
[facility]    Medicine Progress Note - Hospitalist Service, GOLD TEAM 9    Date of Admission:  7/13/2024    Assessment & Plan     Andrew Collier is a 58 year old male admitted on 7/13/2024. He has a past medical history of HLD, DM Type II, HIV, depression, anxiety, and polysubstance abuse who is admitted with recurrent SBO.    Today's Plan:  - Pt c/o ongoing LLQ pain  - KUB obtained: Prominent air-filled bowel loop in the left upper abdomen, concerning for obstruction/developing ileus.   - Appreciate re-consult with General Surgery team, regarding ileus/pSBO  - Restart PRN IV Dilaudid for breakthrough pain  - Continue IVF     Partial Small Bowel Obstruction. Presented with 24 hours of LLQ abdominal pain, nausea/vomiting, and decreased BM's. Known hx of recurrent SBO's. CT abd/pelvis revealed partially obstructed small bowel with partially decompressed small bowel distal to transition point. Surgery consulted, recommend conservative management with NPO status and NG tube placement. Patient noting symptomatic improvement after NG tube placed.  - IV NS @ 100 ml/hour maintained  - NG tube to LIS, was pulled on 7/14  - IV pantoprazole 40 mg daily  - PRN zofran for nausea  - Minimize use of narcotics  - Goal K >4, Mg >2, Phos >3 - replete as needed  - Gastrografin challenge - refused by patient   - General surgery following, appreciate assistance    Immunocompromised due to HIV   HIV. Diagnosed in 2015, has previously received treatment with Triumeq and Genvoya although is now on Biktarvy. Has had issues with compliance in the past, reports he frequently misses doses. Most recent CD4 count 534, HIV RNA quant of 170.  - Continue Biktarvy daily     Poorly Controlled DM Type II. Most recent HgbA1C 13.4% on 2/6/24. Maintained on lantus 30 units QPM and glipizide although reports inconsistent compliance.  - Will continue lantus 18 units at bedtime in setting of NPO status  and/or Poor PO intake  - Medium sliding scale insulin  - Glucose checks q4h while NPO  - Hypoglycemia protocol  - HgbA1C 11.7, elevated     Bladder Wall Thickening. Incidentally noted on CT. Patient denies dysuria, incomplete emptying.  - UA does not suggest UTI at this time     Hx of Polysubstance Abuse. Has previously used IV methamphetamines. Currently reports last use 3 years ago, although per hospitalization 11/2023 endorsed using once/month at that time.  - Consider addiction medicine consult          Diet: Clear Liquid Diet    DVT Prophylaxis: Heparin SQ  Potts Catheter: Not present  Lines: None     Cardiac Monitoring: None  Code Status: Full Code      Clinically Significant Risk Factors          # Hypocalcemia: Lowest Ca = 8.4 mg/dL in last 2 days, will monitor and replace as appropriate  # Hypercalcemia: Highest Ca = 10.4 mg/dL in last 2 days, will monitor as appropriate  # Hypomagnesemia: Lowest Mg = 1.6 mg/dL in last 2 days, will replace as needed                     # DMII: A1C = 11.7 % (Ref range: <5.7 %) within past 6 months         # Financial/Environmental Concerns: none         Disposition Plan     Medically Ready for Discharge: Anticipated in 2-4 Days     Venu Leal MD  Hospitalist Service, GOLD TEAM [address] [place] [address]  Securely message with [employer] (more info)  Text page via [employer] Paging/Directory   See signed in provider for up to date coverage information  ______________________________________________________________________    Interval History   Alert, awake, moderate distress due to recurrent LLQ abd pain     Agreeable with KUB imaging.  Seen with RN at bedside in the AM.    LLQ recurrent severe abd pain up to 10/10 severity, non-radiating.    Physical Exam   Vital Signs: Temp: 98.4  F (36.9  C) Temp src: Oral BP: 124/84 Pulse: 87   Resp: 16 SpO2: 99 % O2 Device: None (Room air)    Weight: 0 lbs 0 oz    General: Alert awake and oriented, moderate  distress, conversational  Eyes: Normal pink mucosa with no signs of pallor, no scleral icterus.  Neck: No significant lymphadenopathy, no palpable LN, No JVD  Heart: Regular rate and rhythm, normal S1, normal S2, no murmurs rubs or gallops, PMI is nondisplaced   Peripherally there is no edema  Lungs: No increased work of breathing, good effort, lungs are clear to auscultation bilaterally   No wheezing or crackles   Breathing on room air  Abdomen: Diffusely tender, nondistended, hypoactive bowel sounds, no palpable masses  Extremities: Normal capillary refill, no edema, no clubbing, no cyanosis  Neuro: Alert and oriented to person place location and situation   Grossly arms and legs are without any focal motor or sensory deficits   Gait was not assessed   Cranial nerves II through XII are grossly intact  Psych: No acute psychosis, good mood, good spirits      Medical Decision Making       52 MINUTES SPENT BY ME on the date of service doing chart review, history, exam, documentation & further activities per the note.   - IV opiates for pain control  - ongoing SBO workup and treatment, as not improved or recurrent today  - Re-consult with general surgery team     Data     I have personally reviewed the following data over the past 24 hrs:    8.1  \   13.3   / 323     134 (L) 103 13.1 /  176 (H)   3.6 23 0.63 (L) \       Imaging results reviewed over the past 24 hrs:   Recent Results (from the past 24 hour(s))   XR Abdomen 1 View    Narrative    XR ABDOMEN 1 VIEW 7/15/2024 10:59 AM    HISTORY: LLQ Abd pain    COMPARISON: 7/13/2024.    FINDINGS: Supine frontal view. Gastric tube has been removed. Mild  colonic stool burden. No pneumatosis or portal venous gas. Prominent  air-filled small bowel loops in the left upper abdomen       Impression    IMPRESSION: Prominent air-filled bowel loop in the left upper abdomen,  concerning for obstruction/developing ileus.    I have personally reviewed the examination and initial  interpretation  and I agree with the findings.    LAURYN FOURNIER MD         SYSTEM ID:  [id number]

## 2024-07-15 NOTE — PLAN OF CARE
Goal Outcome Evaluation:      Plan of Care Reviewed With: patient    Overall Patient Progress: no changeOverall Patient Progress: no change     Shift:    V/S & Pain: VSS on RA. Pain managed with dilaudid  Neuro: AOx4, calm and cooperative  Respiratory: Stable on RA, lung sounds clear bilaterally  Cardiac: VSS  Skin: No new skin concerns  GI/: Voids spontaneously into urinal  Nutrition: Clear liquid diet, denies N/V, last  @0523  Activity: Up ad lexi  Labs: monitoring  Events this shift: no acute events this shift. Pt remains vitally stable on RA. Call light within reach, calls appropriately. NS running at 100 mL/hr

## 2024-07-16 NOTE — PROGRESS NOTES
Brief Internal Medicine Cross Cover Note:     Was notified per nursing staff that patient left AMA. Day team notified via this note; day team to place discharge orders in AM.     BHAVANA Sigala [facility]  Securely message with the Vocera Web Console (learn more here)  Text page via [facility] Paging/Directory

## 2024-07-16 NOTE — ED NOTES
Pt was at door of ED sitting on floor crying and yelling about leaving. Had no PIV in place, left AMA w/o singing form. Jesus Hui PAC updated of AMA status.

## 2024-07-16 NOTE — DISCHARGE SUMMARY
Pt stated he wanted to leave. Nursing staff asked him if he is willing to wait for a provider to come talk to him. Pt refused. Nursing staff asked him to sign the AMA form. Pt refused. Pt was aggressive and yelling at nursing staff. IVs removed.

## 2024-07-17 NOTE — DISCHARGE SUMMARY
[facility]  Hospitalist Discharge Summary      Date of Admission:  7/13/2024  Date of Discharge:  7/15/2024  8:01 PM  Discharging Provider: Venu Leal MD  Discharge Service: Hospitalist Service, GOLD TEAM 9    Discharge Diagnoses       AMA Discharge:    Partial SBO  Leaving AMA  Immunocompromised due to HIV  HIV  Poorly controlled DM2  Bladder wall Thickening  H/o Polysubstance Abuse    Clinically Significant Risk Factors     # DMII: A1C = 11.7 % (Ref range: <5.7 %) within past 6 months       Follow-ups Needed After Discharge   {Additional follow-up instructions/to-do's for PCP    :abdominal exam and satbility    Unresulted Labs Ordered in the Past 30 Days of this Admission       No orders found from 6/13/2024 to 7/14/2024.        These results will be followed up    Discharge Disposition   Discharged to AMA/Discharge departure  Condition at discharge: AMA Discharge    Hospital Course   Andrew Collier is a 58 year old male admitted on 7/13/2024. He has a past medical history of HLD, DM Type II, HIV, depression, anxiety, and polysubstance abuse who is admitted with recurrent SBO.    Today's Plan:  - Pt c/o ongoing LLQ pain  - KUB obtained: Prominent air-filled bowel loop in the left upper abdomen, concerning for obstruction/developing ileus.   - Appreciate re-consult with General Surgery team, regarding ileus/pSBO  - Restart PRN IV Dilaudid for breakthrough pain  - Continue IVF     Partial Small Bowel Obstruction. Presented with 24 hours of LLQ abdominal pain, nausea/vomiting, and decreased BM's. Known hx of recurrent SBO's. CT abd/pelvis revealed partially obstructed small bowel with partially decompressed small bowel distal to transition point. Surgery consulted, recommend conservative management with NPO status and NG tube placement. Patient noting symptomatic improvement after NG tube placed.  - IV NS @ 100 ml/hour maintained  - NG tube to LIS, was pulled  on 7/14  - IV pantoprazole 40 mg daily  - PRN zofran for nausea  - Minimize use of narcotics  - Goal K >4, Mg >2, Phos >3 - replete as needed  - Gastrografin challenge - refused by patient   - General surgery following, appreciate assistance    Immunocompromised due to HIV   HIV. Diagnosed in 2015, has previously received treatment with Triumeq and Genvoya although is now on Biktarvy. Has had issues with compliance in the past, reports he frequently misses doses. Most recent CD4 count 534, HIV RNA quant of 170.  - Continue Biktarvy daily     Poorly Controlled DM Type II. Most recent HgbA1C 13.4% on 2/6/24. Maintained on lantus 30 units QPM and glipizide although reports inconsistent compliance.  - Will continue lantus 18 units at bedtime in setting of NPO status and/or Poor PO intake  - Medium sliding scale insulin  - Glucose checks q4h while NPO  - Hypoglycemia protocol  - HgbA1C 11.7, elevated     Bladder Wall Thickening. Incidentally noted on CT. Patient denies dysuria, incomplete emptying.  - UA does not suggest UTI at this time     Hx of Polysubstance Abuse. Has previously used IV methamphetamines. Currently reports last use 3 years ago, although per hospitalization 11/2023 endorsed using once/month at that time.  - Consider addiction medicine consult    Consultations This Hospital Stay   SURGERY GENERAL ADULT IP CONSULT  NURSING TO CONSULT FOR VASCULAR ACCESS CARE IP CONSULT  CARE MANAGEMENT / SOCIAL WORK IP CONSULT  SURGERY GENERAL ADULT IP CONSULT    Code Status   Prior    Time Spent on this Encounter   I, Venu Leal MD, personally saw the patient today and spent less than or equal to 30 minutes discharging this patient.       Venu Leal MD  [facility] EMERGENCY DEPARTMENT  [address] [address]  Phone: [phone]  ______________________________________________________________________    Physical Exam   Vital Signs:                    Weight: 0 lbs 0 oz    AMA Exam was not  performed due to Patient leaving AMA.       Primary Care Physician   Physician No Ref-Primary    Discharge Orders   No discharge procedures on file.    Significant Results and Procedures   Results for orders placed or performed during the hospital encounter of 07/13/24   XR Foot Right G/E 3 Views    Narrative    EXAM: XR FOOT RIGHT G/E 3 VIEWS  LOCATION: [facility]  DATE: 7/13/2024    INDICATION: Evaluate for possible foreign body to plantar foot; wound present with pain; patient suspects he stepped on a piece of glass  COMPARISON: 6/20/2023      Impression    IMPRESSION: Normal joint spaces and alignment. No acute fracture. No radiopaque foreign body.   CT Abdomen Pelvis w Contrast    Narrative    EXAMINATION: CT ABDOMEN PELVIS W CONTRAST  7/13/2024 4:37 PM      CLINICAL HISTORY: LLQ abdominal pain, nausea, vomiting    COMPARISON: 11/19/2023    PROCEDURE COMMENTS: CT of the abdomen was performed with  AmpMxz806:95mL intravenous contrast. Coronal and sagittal reformatted  images were obtained.    FINDINGS:  Lower thorax:   Mild bibasilar atelectasis. No pleural effusion.    Abdomen and pelvis:  The liver and biliary system, spleen, and pancreas are normal in  appearance. Focal fatty infiltration of the liver along the falciform  ligament. Cholelithiasis without evidence of cholecystitis.    The adrenal glands and kidneys are normal in appearance.    There are dilated loops of small bowel measuring up to 4.1 cm. Area of  partial obstruction is seen on image 296 of series 4 with partially  decompressed bowel distally to this transition point. There is fecal  material and air within the large bowel. There is no free air or free  fluid. Diffuse diverticulosis without evidence of diverticulitis.  Diffuse wall thickening of the imaged esophagus. There are no  abnormally sized lymph nodes.    Penile prosthesis. Prostatomegaly. Diffuse wall thickening of the  urinary  bladder.    Osseous structures:   Degenerative changes of the lumbar and thoracic spine with probable  chronic compression deformities of T8 and T9.      Impression    IMPRESSION:  1. Partially obstructed small bowel with partially decompressed small  bowel distal to transition point in the mid abdomen.  2. Diffuse wall thickening of the visualized esophagus suggesting  esophagitis, possibly related to reported vomiting.  3. Diffuse wall thickening of the urinary bladder may indicate chronic  outlet obstruction versus cystitis..  4. Cholelithiasis without evidence cholecystitis.    I have personally reviewed the examination and initial interpretation  and I agree with the findings.    ASH KAY DO         SYSTEM ID:  [id number]   XR Abdomen Port 1 View    Narrative    EXAM: XR ABDOMEN PORT 1 VIEW  LOCATION: [facility]  DATE: 7/13/2024    INDICATION: Confirm NG tube placement  COMPARISON: 11/8/2023       Impression    IMPRESSION: NG tube tip and sidehole in the stomach. Urinary excretion of IV contrast. No change from prior.    XR Abdomen 1 View    Narrative    XR ABDOMEN 1 VIEW 7/15/2024 10:59 AM    HISTORY: LLQ Abd pain    COMPARISON: 7/13/2024.    FINDINGS: Supine frontal view. Gastric tube has been removed. Mild  colonic stool burden. No pneumatosis or portal venous gas. Prominent  air-filled small bowel loops in the left upper abdomen       Impression    IMPRESSION: Prominent air-filled bowel loop in the left upper abdomen,  concerning for obstruction/developing ileus.    I have personally reviewed the examination and initial interpretation  and I agree with the findings.    LAURYN FOURNIER MD         SYSTEM ID:  [id number]   XR Abdomen 1 View    Narrative    EXAMINATION:  XR ABDOMEN 1 VIEW 7/15/2024     COMPARISON: Abdominal radiograph, 7/15/2024; CT abdomen and pelvis,  7/13/2024.    HISTORY: assess for any serial changes or improvement.    FINDINGS: Supine frontal  radiographic views of the abdomen and  pelvis.Increase in air-filled distention of bowel loops including  mildly dilated small bowel loops in the central abdomen and air-filled  distention of right colon. No portal venous gas or pneumatosis. Mild  colonic stool burden. Partially visualized penile prosthesis.      Impression    IMPRESSION: Compared to radiograph earlier today, increase extent of  air-filled prominent bowel loops with now mildly dilated air-filled  small bowel loop seen in the right central abdomen, and increased air  in the nondilated appearing right colon. Findings may represent  increasing partial obstruction versus adynamic ileus.    I have personally reviewed the examination and initial interpretation  and I agree with the findings.    LAURYN FOURNIER MD         SYSTEM ID:  [id number]     *Note: Due to a large number of results and/or encounters for the requested time period, some results have not been displayed. A complete set of results can be found in Results Review.       Discharge Medications   Discharge Medication List as of 7/15/2024  8:01 PM        CONTINUE these medications which have NOT CHANGED    Details   bictegravir-emtricitabine-tenofovir (BIKTARVY) -25 MG per tablet Take 1 tablet by mouth daily, Disp-30 tablet, R-2, E-Prescribe      blood glucose (NO BRAND SPECIFIED) lancets standard Use to test blood sugar 1 time daily or as directed.Disp-100 Lancet, [id number]      blood glucose (NO BRAND SPECIFIED) test strip Use to test blood sugar 1 time daily or as directed., Disp-100 strip, R-4, E-Prescribe      blood glucose monitoring (NO BRAND SPECIFIED) meter device kit Use to test blood sugar 3 times daily or as directed.Disp-1 kit, [id number]      Continuous Blood Gluc Sensor (DEXCOM G6 SENSOR) MISC 1 each every 10 days Change every 10 days., Disp-3 each, R-5, E-Prescribe      Continuous Blood Gluc Transmit (DEXCOM G6 TRANSMITTER) MISC 1 each every 3 months Change every 3  "months., Disp-1 each, R-1, E-Prescribe      famotidine (PEPCID) 40 MG tablet Take 1 tablet (40 mg) by mouth daily, Disp-30 tablet, R-2, E-Prescribe      glipiZIDE (GLUCOTROL XL) 10 MG 24 hr tablet Take 1 tablet (10 mg) by mouth daily, Disp-90 tablet, R-1, E-Prescribe      insulin glargine (LANTUS PEN) 100 UNIT/ML pen Inject 30 Units Subcutaneous at bedtime, Disp-15 mL, R-0, E-PrescribeIf Lantus is not covered by insurance, may substitute Basaglar or Semglee or other insulin glargine product per insurance preference at same dose and frequency.        insulin pen needle (32G X 4 MM) 32G X 4 MM miscellaneous Use 4-5 pen needles daily or as directed.Disp-100 each, [id number]      Nutritional Supplements (ENSURE ACTIVE) LIQD Take 414 mLs by mouth daily, Disp-30 mL, R-11, E-Prescribe      polyethylene glycol (MIRALAX) 17 GM/Dose powder Take 17 g by mouth 2 times daily, Disp-510 g, R-0, E-Prescribe      senna-docusate (SENOKOT-S/PERICOLACE) 8.6-50 MG tablet Take 1-2 tablets by mouth 2 times daily as needed for constipation, Disp-60 tablet, R-0, E-Prescribe           Allergies   Allergies   Allergen Reactions    Fentanyl Blisters     Per pt, after taking this medication had blisters develope    Tylenol [Acetaminophen] Itching    Dulaglutide Rash    Insulin Lispro Rash     Patient reported    Penicillin V Other (See Comments) and Rash     Diffuse maculopapular rash + feels \"high\", per pt.      "

## 2024-07-19 ENCOUNTER — HOSPITAL ENCOUNTER (EMERGENCY)
Facility: CLINIC | Age: 59
Discharge: HOME | End: 2024-07-19
Attending: EMERGENCY MEDICINE | Admitting: EMERGENCY MEDICINE
Payer: COMMERCIAL

## 2024-07-19 VITALS
HEIGHT: 64 IN | TEMPERATURE: 98.2 F | OXYGEN SATURATION: 96 % | WEIGHT: 160.1 LBS | HEART RATE: 62 BPM | RESPIRATION RATE: 16 BRPM | SYSTOLIC BLOOD PRESSURE: 148 MMHG | BODY MASS INDEX: 27.33 KG/M2 | DIASTOLIC BLOOD PRESSURE: 76 MMHG

## 2024-07-19 DIAGNOSIS — M79.621 PAIN OF RIGHT UPPER ARM: ICD-10-CM

## 2024-07-19 PROCEDURE — 250N000013 HC RX MED GY IP 250 OP 250 PS 637: Performed by: EMERGENCY MEDICINE

## 2024-07-19 PROCEDURE — 99283 EMERGENCY DEPT VISIT LOW MDM: CPT | Performed by: EMERGENCY MEDICINE

## 2024-07-19 RX ORDER — DIPHENHYDRAMINE HCL 50 MG
50 CAPSULE ORAL ONCE
Status: COMPLETED | OUTPATIENT
Start: 2024-07-19 | End: 2024-07-19

## 2024-07-19 RX ORDER — CEPHALEXIN 500 MG/1
500 CAPSULE ORAL 3 TIMES DAILY
Qty: 21 CAPSULE | Refills: 0 | Status: SHIPPED | OUTPATIENT
Start: 2024-07-19 | End: 2024-07-21

## 2024-07-19 RX ADMIN — DIPHENHYDRAMINE HYDROCHLORIDE 50 MG: 50 CAPSULE ORAL at 04:53

## 2024-07-19 ASSESSMENT — ACTIVITIES OF DAILY LIVING (ADL)
ADLS_ACUITY_SCORE: 37

## 2024-07-19 NOTE — ED TRIAGE NOTES
Triage Assessment (Adult)       Row Name 07/19/24 0405          Triage Assessment    Airway WDL WDL        Respiratory WDL    Respiratory WDL WDL        Skin Circulation/Temperature WDL    Skin Circulation/Temperature WDL WDL        Cardiac WDL    Cardiac WDL WDL        Peripheral/Neurovascular WDL    Peripheral Neurovascular WDL WDL        Cognitive/Neuro/Behavioral WDL    Cognitive/Neuro/Behavioral WDL WDL

## 2024-07-19 NOTE — DISCHARGE INSTRUCTIONS
Thank you for your patience today.  Please follow-up with your regular doctor in the next 2-3 days for further evaluation and follow-up care.  Please call to schedule an appointment.  Please continue your own medications.  Please take tylenol or ibuprofen as needed for pain, benadryl as needed for itching. Please take antibiotics as directed. Please return to the ER if you develop any worsening of your current symptoms.  It was a pleasure taking care of you today.  We hope you feel better soon.

## 2024-07-19 NOTE — ED PROVIDER NOTES
ED Provider Note  [facility]      History     Chief Complaint   Patient presents with    Arm Pain     BIBA, complaining of right forearm pain, thinks he was bit by a bug. Redness and swelling at site. Also complaining of neck pain,  with EMS.     Neck Pain     HPI  Andrew Collier is a 58 year old male who has a past medical history of hyperlipidemia, type 2 diabetes, HIV, depression, anxiety, polysubstance abuse who presents to the emergency department for evaluation of right forearm pain.  Patient states that earlier this afternoon he was riding on the bus, believes he was bit by a bug or stung by a bee.  Patient reports pain to an area on his right forearm along with some redness, swelling.  Patient denies any fever, chills, denies any chest pain, shortness of breath, cough or cold-like symptoms, denies any abdominal pain, nausea, vomiting, diarrhea.  Patient denies any weakness, tingling, numbness.  No medications prior to arrival.  Patient denies any IV drug use.  Denies any other substance use.  No other complaints.    Past Medical History  Past Medical History:   Diagnosis Date    Acute appendicitis with localized peritonitis 1/31/2018    AIDS (H)     Allergic rhinitis due to other allergen     DNS    Anal dysplasia     Chronic abdominal pain     CNS toxoplasmosis (H)     COVID-19 1/11/2022    Diabetes type 2, controlled (H)     GERD (gastroesophageal reflux disease)     Herpes zoster 9/23/2016    History of seizure     History of substance abuse (H)     HIV (human immunodeficiency virus infection) (H)     HLD (hyperlipidemia)     Lung nodules     Periungual wart     Pneumonia 1/6/2019    PTSD (post-traumatic stress disorder)     Sleep apnea     doesn't use CPAP     Past Surgical History:   Procedure Laterality Date    ANOSCOPY N/A 9/9/2020    Procedure: Exam Under Anesthesia, ANOSCOPY, fulgeration of rectal fissures with Rectal Biopsies;  Surgeon: Thanh Lundberg MD;   Location: [facility] OR    COLONOSCOPY Left 1/22/2016    Procedure: COMBINED COLONOSCOPY, SINGLE OR MULTIPLE BIOPSY/POLYPECTOMY BY BIOPSY;  Surgeon: Clark Saini MD;  Location: [facility] GI    ESOPHAGOSCOPY, GASTROSCOPY, DUODENOSCOPY (EGD), COMBINED N/A 6/6/2022    Procedure: ESOPHAGOGASTRODUODENOSCOPY, WITH BIOPSY;  Surgeon: Kecia Benítez MD;  Location: [facility] GI    HC EXPLORE UNDESC TESTIS,INGUIN/SCROTAL      LAPAROSCOPIC APPENDECTOMY N/A 1/31/2018    Procedure: LAPAROSCOPIC APPENDECTOMY;  LAPAROSCOPIC APPENDECTOMY;  Surgeon: Dawn Holt MD;  Location: [facility] OR    LAPAROSCOPY DIAGNOSTIC (GENERAL) N/A 7/26/2016    Procedure: LAPAROSCOPY DIAGNOSTIC (GENERAL);  Surgeon: Susannah Arriaga MD;  Location: [facility] OR    LAPAROSCOPY DIAGNOSTIC (GENERAL) N/A 4/16/2018    Procedure: LAPAROSCOPY DIAGNOSTIC (GENERAL);  Diagnostic laparoscopy and lysis of adhesions;  Surgeon: Prince Dowling MD;  Location: [facility] OR    OPTICAL TRACKING SYSTEM CRANIOTOMY, EXCISE TUMOR, COMBINED Left 4/10/2015    Procedure: COMBINED OPTICAL TRACKING SYSTEM CRANIOTOMY, EXCISE TUMOR;  Surgeon: Mirlande Colmenares MD;  Location: [facility] OR    REPAIR GAMEKEEPER'S THUMB Right 12/2/2016    Procedure: REPAIR LIGAMENT ULNAR COLLATERAL THUMB (GAMEKEEPER'S);  Surgeon: Evin Zamorano MD;  Location: [facility] OR    ZZC NONSPECIFIC PROCEDURE      right forearm fracture     cephALEXin (KEFLEX) 500 MG capsule  bictegravir-emtricitabine-tenofovir (BIKTARVY) -25 MG per tablet  blood glucose (NO BRAND SPECIFIED) lancets standard  blood glucose (NO BRAND SPECIFIED) test strip  blood glucose monitoring (NO BRAND SPECIFIED) meter device kit  Continuous Blood Gluc Sensor (DEXCOM G6 SENSOR) MISC  Continuous Blood Gluc Transmit (DEXCOM G6 TRANSMITTER) MISC  famotidine (PEPCID) 40 MG tablet  glipiZIDE (GLUCOTROL XL) 10 MG 24 hr tablet  insulin glargine (LANTUS PEN) 100 UNIT/ML pen  insulin pen needle (32G X 4 MM) 32G X 4 MM miscellaneous  Nutritional  "Supplements (ENSURE ACTIVE) LIQD  polyethylene glycol (MIRALAX) 17 GM/Dose powder  senna-docusate (SENOKOT-S/PERICOLACE) 8.6-50 MG tablet      Allergies   Allergen Reactions    Fentanyl Blisters     Per pt, after taking this medication had blisters develope    Tylenol [Acetaminophen] Itching    Dulaglutide Rash    Insulin Lispro Rash     Patient reported    Penicillin V Other (See Comments) and Rash     Diffuse maculopapular rash + feels \"high\", per pt.      Family History  Family History   Problem Relation Age of Onset    Diabetes Brother     Diabetes Father     Alzheimer Disease Father     Unknown/Adopted Mother     Diabetes Paternal Grandfather     Cancer No family hx of         no skin cancer    Skin Cancer No family hx of         no famiy hx of skin cancer    Glaucoma No family hx of     Macular Degeneration No family hx of      Social History   Social History     Tobacco Use    Smoking status: Some Days     Current packs/day: 0.25     Average packs/day: 0.3 packs/day for 40.0 years (10.0 ttl pk-yrs)     Types: Cigarettes    Smokeless tobacco: Former   Substance Use Topics    Alcohol use: No     Alcohol/week: 0.0 standard drinks of alcohol     Comment: Last etoh in 2007    Drug use: Not Currently     Types: Marijuana, Methamphetamines      Past medical history, past surgical history, medications, allergies, family history, and social history were reviewed with the patient. No additional pertinent items.   A medically appropriate review of systems was performed with pertinent positives and negatives noted in the HPI, and all other systems negative.    Physical Exam   BP: (!) 148/76  Pulse: 62  Temp: 98.2  F (36.8  C)  Resp: 16  Height: 162.6 cm (5' 4\")  Weight: 72.6 kg (160 lb 1.6 oz)  SpO2: 96 %  Physical Exam  General: Afebrile, no acute distress   HEENT: Normocephalic, atraumatic, conjunctivae normal. MMM  Neck: non-tender, supple  Cardio: regular rate. regular rhythm   Resp: Normal work of breathing, no " respiratory distress, lungs clear bilaterally, no wheezing, rhonchi, rales  Chest/Back: no visual signs of trauma, no CVA tenderness   Abdomen: soft, non distension, no tenderness, no peritoneal signs   Neuro: alert and fully oriented. CN II-XII grossly intact. Grossly normal strength and sensation in all extremities.   MSK: no deformities. Normal range of motion  Integumentary/Skin: no rash visualized, normal color  Psych: normal affect, normal behavior      ED Course, Procedures, & Data      Procedures       No results found for any visits on 07/19/24.  Medications   diphenhydrAMINE (BENADRYL) capsule 50 mg (50 mg Oral $Given 7/19/24 9001)     Labs Ordered and Resulted from Time of ED Arrival to Time of ED Departure - No data to display  No orders to display          Critical care was not performed.     Medical Decision Making  The patient's presentation was of low complexity (an acute and uncomplicated illness or injury).    The patient's evaluation involved:  review of external note(s) from 2 sources (prior most recent ED visits)  strong consideration of a test (cbc, cmp) that was ultimately deferred    The patient's management necessitated moderate risk (prescription drug management including medications given in the ED).    Assessment & Plan    Andrew Collier is a 58 year old male who has a past medical history of hyperlipidemia, type 2 diabetes, HIV, depression, anxiety, polysubstance abuse who presents to the emergency department for evaluation of right forearm pain.  Upon arrival patient is nontoxic-appearing, afebrile, no distress.  Patient blood pressure mildly elevated at 148/76, otherwise hemodynamically stable.  On examination there is a small area of raised erythema and tenderness palpation on his right forearm, no area of fluctuance.  Given patient's history this could be localized reaction secondary to insect bite versus early cellulitis.  Patient was treated with a dose of Benadryl in the  emergency department.    On reevaluation after monitoring in the emergency department patient continues to be resting complete, no distress.  Patient reports improvement of his symptoms, pain, swelling after Benadryl, suspect likely localized reaction to possible insect bite.  Given patient's medical comorbidities, discussed with patient we will send him home with a prescription to Keflex to start taking if any signs of erythema, warmth, fever, concerning for cellulitis.  Patient agrees with the plan.  Encourage close outpatient follow-up and return precautions discussed.    I have reviewed the nursing notes. I have reviewed the findings, diagnosis, plan and need for follow up with the patient.    Discharge Medication List as of 7/19/2024  7:00 AM        START taking these medications    Details   cephALEXin (KEFLEX) 500 MG capsule Take 1 capsule (500 mg) by mouth 3 times daily for 7 days, Disp-21 capsule, R-0, Local Print             Final diagnoses:   Pain of right upper arm       Sheila Bruno MD  [facility] EMERGENCY DEPARTMENT  7/19/2024     Sheila Bruno MD  07/20/24 0622

## 2024-07-21 ENCOUNTER — HOSPITAL ENCOUNTER (EMERGENCY)
Facility: CLINIC | Age: 59
Discharge: HOME | End: 2024-07-21
Attending: EMERGENCY MEDICINE | Admitting: EMERGENCY MEDICINE
Payer: COMMERCIAL

## 2024-07-21 VITALS
WEIGHT: 155 LBS | HEIGHT: 62 IN | DIASTOLIC BLOOD PRESSURE: 74 MMHG | SYSTOLIC BLOOD PRESSURE: 117 MMHG | OXYGEN SATURATION: 99 % | TEMPERATURE: 99.7 F | HEART RATE: 94 BPM | BODY MASS INDEX: 28.52 KG/M2 | RESPIRATION RATE: 20 BRPM

## 2024-07-21 DIAGNOSIS — T14.8XXA ABRASION: ICD-10-CM

## 2024-07-21 DIAGNOSIS — Z79.4 TYPE 2 DIABETES MELLITUS WITH HYPERGLYCEMIA, WITH LONG-TERM CURRENT USE OF INSULIN (H): ICD-10-CM

## 2024-07-21 DIAGNOSIS — T63.481A INSECT STING: ICD-10-CM

## 2024-07-21 DIAGNOSIS — E11.65 TYPE 2 DIABETES MELLITUS WITH HYPERGLYCEMIA, WITH LONG-TERM CURRENT USE OF INSULIN (H): ICD-10-CM

## 2024-07-21 PROCEDURE — 99283 EMERGENCY DEPT VISIT LOW MDM: CPT | Mod: FS | Performed by: EMERGENCY MEDICINE

## 2024-07-21 PROCEDURE — 99283 EMERGENCY DEPT VISIT LOW MDM: CPT | Performed by: EMERGENCY MEDICINE

## 2024-07-21 RX ORDER — CEPHALEXIN 500 MG/1
500 CAPSULE ORAL 4 TIMES DAILY
Qty: 21 CAPSULE | Refills: 0 | Status: SHIPPED | OUTPATIENT
Start: 2024-07-21 | End: 2024-08-18

## 2024-07-21 RX ORDER — BENZOCAINE/MENTHOL 6 MG-10 MG
LOZENGE MUCOUS MEMBRANE 2 TIMES DAILY
Qty: 1.5 G | Refills: 0 | Status: SHIPPED | OUTPATIENT
Start: 2024-07-21 | End: 2024-07-28

## 2024-07-21 ASSESSMENT — COLUMBIA-SUICIDE SEVERITY RATING SCALE - C-SSRS
2. HAVE YOU ACTUALLY HAD ANY THOUGHTS OF KILLING YOURSELF IN THE PAST MONTH?: NO
1. IN THE PAST MONTH, HAVE YOU WISHED YOU WERE DEAD OR WISHED YOU COULD GO TO SLEEP AND NOT WAKE UP?: NO
6. HAVE YOU EVER DONE ANYTHING, STARTED TO DO ANYTHING, OR PREPARED TO DO ANYTHING TO END YOUR LIFE?: NO

## 2024-07-21 ASSESSMENT — ACTIVITIES OF DAILY LIVING (ADL): ADLS_ACUITY_SCORE: 35

## 2024-07-21 NOTE — ED TRIAGE NOTES
"Chief Complaint: Andrew presents to the ED for evaluation of several wounds, which are red, swollen, and painful.     Onset of Symptoms: Ongoing    Home Remedies: Cold shower    Triage Vital Signs: /74   Pulse 94   Temp 99.7  F (37.6  C) (Oral)   Resp 20   Ht 1.575 m (5' 2\")   Wt 70.3 kg (155 lb)   SpO2 99%   BMI 28.35 kg/m      Karl Kramer RN  7/21/2024     Triage Assessment (Adult)       Row Name 07/21/24 1551          Triage Assessment    Airway WDL WDL        Respiratory WDL    Respiratory WDL WDL        Skin Circulation/Temperature WDL    Skin Circulation/Temperature WDL WDL        Cardiac WDL    Cardiac WDL WDL        Peripheral/Neurovascular WDL    Peripheral Neurovascular WDL WDL        Cognitive/Neuro/Behavioral WDL    Cognitive/Neuro/Behavioral WDL WDL                     "

## 2024-07-21 NOTE — ED PROVIDER NOTES
ED Provider Note  [facility]      History     Chief Complaint   Patient presents with    Wound Infection     HPI  Andrew Collier is a 58 year old male with a history of DM II, HIV, last CD4 count 534 2/6/2024 polysubstance abuse who presents to the ED for evaluation of wounds.  Patient presents alone.    He notes that over the last 2 to 3 days he has noticed a painful area to the dorsal aspect of his right forearm.  He reports either being stung or coming in contact with a plant and noticing a burning stinging feeling in this area.  He denies any other injury any broken needles in the area.  Per EMR he was seen for similar difficulties 7/19/2024 and was prescribed Keflex.  Patient does not recall this episode has not taken any antibiotic.    He notes he is also developed some superficial abrasions to both shins after running into an object a few days ago as well.  He thinks he may have had a fever last night although did not take any measured fevers with thermometer and his symptoms resolved after a cold shower.  He otherwise denies any associated rhinorrhea cough dyspnea chest pain abdominal pain vomiting diarrhea constipation urinary symptoms.  He states he is taking his antivirals for HIV although did recently run out of his insulin yesterday and is hoping for refill on this.  He has no other concerns.    Past Medical History  Past Medical History:   Diagnosis Date    Acute appendicitis with localized peritonitis 1/31/2018    AIDS (H)     Allergic rhinitis due to other allergen     DNS    Anal dysplasia     Chronic abdominal pain     CNS toxoplasmosis (H)     COVID-19 1/11/2022    Diabetes type 2, controlled (H)     GERD (gastroesophageal reflux disease)     Herpes zoster 9/23/2016    History of seizure     History of substance abuse (H)     HIV (human immunodeficiency virus infection) (H)     HLD (hyperlipidemia)     Lung nodules     Periungual wart     Pneumonia 1/6/2019     PTSD (post-traumatic stress disorder)     Sleep apnea     doesn't use CPAP     Past Surgical History:   Procedure Laterality Date    ANOSCOPY N/A 9/9/2020    Procedure: Exam Under Anesthesia, ANOSCOPY, fulgeration of rectal fissures with Rectal Biopsies;  Surgeon: Thanh Lundberg MD;  Location: [facility] OR    COLONOSCOPY Left 1/22/2016    Procedure: COMBINED COLONOSCOPY, SINGLE OR MULTIPLE BIOPSY/POLYPECTOMY BY BIOPSY;  Surgeon: Clark Saini MD;  Location: [facility]    ESOPHAGOSCOPY, GASTROSCOPY, DUODENOSCOPY (EGD), COMBINED N/A 6/6/2022    Procedure: ESOPHAGOGASTRODUODENOSCOPY, WITH BIOPSY;  Surgeon: Kecia Benítez MD;  Location: [facility]    HC EXPLORE UNDESC TESTIS,INGUIN/SCROTAL      LAPAROSCOPIC APPENDECTOMY N/A 1/31/2018    Procedure: LAPAROSCOPIC APPENDECTOMY;  LAPAROSCOPIC APPENDECTOMY;  Surgeon: Dawn Holt MD;  Location: [facility] OR    LAPAROSCOPY DIAGNOSTIC (GENERAL) N/A 7/26/2016    Procedure: LAPAROSCOPY DIAGNOSTIC (GENERAL);  Surgeon: Susannah Arriaga MD;  Location: [facility] OR    LAPAROSCOPY DIAGNOSTIC (GENERAL) N/A 4/16/2018    Procedure: LAPAROSCOPY DIAGNOSTIC (GENERAL);  Diagnostic laparoscopy and lysis of adhesions;  Surgeon: Prince Dowling MD;  Location: [facility] OR    OPTICAL TRACKING SYSTEM CRANIOTOMY, EXCISE TUMOR, COMBINED Left 4/10/2015    Procedure: COMBINED OPTICAL TRACKING SYSTEM CRANIOTOMY, EXCISE TUMOR;  Surgeon: Mirlande Colmenares MD;  Location: [facility] OR    REPAIR GAMEKEEPER'S THUMB Right 12/2/2016    Procedure: REPAIR LIGAMENT ULNAR COLLATERAL THUMB (GAMEKEEPER'S);  Surgeon: Evin Zamorano MD;  Location: [facility] OR    ZZC NONSPECIFIC PROCEDURE      right forearm fracture     cephALEXin (KEFLEX) 500 MG capsule  hydrocortisone (CORTAID) 1 % external cream  insulin glargine (LANTUS PEN) 100 UNIT/ML pen  bictegravir-emtricitabine-tenofovir (BIKTARVY) -25 MG per tablet  blood glucose (NO BRAND SPECIFIED) lancets standard  blood glucose (NO BRAND SPECIFIED) test strip  blood  "glucose monitoring (NO BRAND SPECIFIED) meter device kit  Continuous Blood Gluc Sensor (DEXCOM G6 SENSOR) MISC  Continuous Blood Gluc Transmit (DEXCOM G6 TRANSMITTER) MISC  famotidine (PEPCID) 40 MG tablet  glipiZIDE (GLUCOTROL XL) 10 MG 24 hr tablet  insulin pen needle (32G X 4 MM) 32G X 4 MM miscellaneous  Nutritional Supplements (ENSURE ACTIVE) LIQD  polyethylene glycol (MIRALAX) 17 GM/Dose powder  senna-docusate (SENOKOT-S/PERICOLACE) 8.6-50 MG tablet      Allergies   Allergen Reactions    Fentanyl Blisters     Per pt, after taking this medication had blisters develope    Tylenol [Acetaminophen] Itching    Dulaglutide Rash    Insulin Lispro Rash     Patient reported    Penicillin V Other (See Comments) and Rash     Diffuse maculopapular rash + feels \"high\", per pt.      Family History  Family History   Problem Relation Age of Onset    Diabetes Brother     Diabetes Father     Alzheimer Disease Father     Unknown/Adopted Mother     Diabetes Paternal Grandfather     Cancer No family hx of         no skin cancer    Skin Cancer No family hx of         no famiy hx of skin cancer    Glaucoma No family hx of     Macular Degeneration No family hx of      Social History   Social History     Tobacco Use    Smoking status: Some Days     Current packs/day: 0.25     Average packs/day: 0.3 packs/day for 40.0 years (10.0 ttl pk-yrs)     Types: Cigarettes    Smokeless tobacco: Former   Substance Use Topics    Alcohol use: No     Alcohol/week: 0.0 standard drinks of alcohol     Comment: Last etoh in 2007    Drug use: Not Currently     Types: Marijuana, Methamphetamines      A medically appropriate review of systems was performed with pertinent positives and negatives noted in the HPI, and all other systems negative.    Physical Exam   BP: 117/74  Pulse: 94  Temp: 99.7  F (37.6  C)  Resp: 20  Height: 157.5 cm (5' 2\")  Weight: 70.3 kg (155 lb)  SpO2: 99 %  Physical Exam      GENERAL APPEARANCE: The patient is well developed, well " appearing, and in no acute distress.  HEAD:  Normocephalic and atraumatic.   EENT: Voice normal.  NECK: Trachea is midline.No lymphadenopathy or tenderness.  LUNGS: Breath sounds are equal and clear bilaterally. No wheezes, rhonchi, or rales.  HEART: Regular rate and normal rhythm.    ABDOMEN: Soft, flat, and benign. No mass, tenderness, guarding, or rebound.  EXTREMITIES: No cyanosis, clubbing, or edema.  NEUROLOGIC: No focal sensory or motor deficits are noted.  PSYCHIATRIC: The patient is awake, alert.  Appropriate mood and affect.  SKIN: Exam of the bilateral anterior shins shows superficial abrasions each about 2 cm in diameter without associated tenderness fluctuance lymphangitic streaking or purulence to suggest infection.  Exam of the right forearm shows mildly erythematous papule about 1 cm in diameter.  Area is firm without fluctuance lymphangitic streaking or tenderness.                            ED Course, Procedures, & Data           No results found for any visits on 07/21/24.  Medications - No data to display  Labs Ordered and Resulted from Time of ED Arrival to Time of ED Departure - No data to display  No orders to display          Critical care was not performed.     Medical Decision Making  The patient's presentation was of low complexity (an acute and uncomplicated illness or injury).    The patient's evaluation involved:  review of external note(s) from 1 sources (see separate area of note for details)    The patient's management necessitated moderate risk (prescription drug management including medications given in the ED).    Assessment & Plan    This is a medically complex 58-year-old male present with concerns for several days of right forearm wound as well as superficial abrasions to both shins after hitting them a few days ago.  On presentation to department patient afebrile normoxic and is not tachycardic.  He is not ill-appearing.  Examination of both shins show findings consistent with  superficial abrasions without findings suggest superimposed infection.  Exam of patient's right forearm shows an erythematous papule without tenderness lymphangitic streaking or fluctuance.  He does not have findings suggest abscess.  Reviewed recent medical records for when patient was seen for similar difficulties a few days ago and prescribed Keflex.  He has not taken Keflex at this point did not know he had a prescription.  Discussed starting him on Keflex for possible developing skin infection which he is agreeable to.  Will also refill his insulin.  Low suspicion of systemic infection at this time or other deep space infection.  I will give him a prescription for topical hydrocortisone as well as this may be superficial contact irritant dermatitis possibly from insect sting versus plant exposure with the appearance of the lesion.  Patient does not have a primary care provider he unfortunately does not have good access to a phone although does have email and uses [employer].  I placed a referral to follow-up with primary care.  We discussed return precautions.  Patient has no other questions or concerns at this time.  Red flag signs were addressed, and they were in agreement with the patient care plan provided.    Patient seen and discussed with attending physician , who agrees with my plan of care.      I have reviewed the nursing notes. I have reviewed the findings, diagnosis, plan and need for follow up with the patient.    Discharge Medication List as of 7/21/2024  4:50 PM        START taking these medications    Details   hydrocortisone (CORTAID) 1 % external cream Apply topically 2 times daily for 7 days Right forearmDisp-1.5 g, [id number]             Final diagnoses:   Abrasion - Bilateral shin   Insect sting - right forearm       VERITO Miller  [facility] EMERGENCY DEPARTMENT  7/21/2024     Adri Oswald PA-C  07/21/24 [id number]

## 2024-07-21 NOTE — ED NOTES
Pt. Was seen walking outside hospital before being seen by provider after being checked in. Pt. Has a hx of eloping/AMA. Triage call x1. Pt came back. Pt educated to inform staff if he wants to leave and not to leave unit after checking in if wanted to be seen. Pt. Verbalized understanding.

## 2024-07-21 NOTE — DISCHARGE INSTRUCTIONS
Here in the emergency room have reassuring evaluation.  I did send prescription for Keflex antibiotic to the first-floor pharmacy in addition to refilling her insulin you should start these as directed.  Also send hydrocortisone ointment as well which may help if there is some inflammation related to insect sting of her forearm.  We discussed keeping a close eye on the areas returning if you notice any new or worsening symptoms occluding any redness swelling pus fevers.  I placed a referral to follow-up with primary care and they should be calling you.

## 2024-07-23 DIAGNOSIS — Z21 HUMAN IMMUNODEFICIENCY VIRUS I INFECTION (H): ICD-10-CM

## 2024-07-23 RX ORDER — BICTEGRAVIR SODIUM, EMTRICITABINE, AND TENOFOVIR ALAFENAMIDE FUMARATE 50; 200; 25 MG/1; MG/1; MG/1
1 TABLET ORAL DAILY
Qty: 30 TABLET | Refills: 0 | Status: SHIPPED | OUTPATIENT
Start: 2024-07-23 | End: 2024-07-25

## 2024-07-23 NOTE — TELEPHONE ENCOUNTER
"Medication Refill                                                     Refill request receive for:Greta    Last refill: 02/07/2024    Last Office Visit 2/7/2024  Future appt scheduled? No     POC for medication if indicated from last note including duration of treatment if applicable: Yes, needs appointment for further refills though      RECENT LABS/VITALS                                                        Lab Results   Component Value Date    AST 18 07/13/2024    AST 12 06/24/2021     Lab Results   Component Value Date    ALT 18 07/13/2024    ALT 20 06/24/2021     Creatinine   Date Value Ref Range Status   07/15/2024 0.63 (L) 0.67 - 1.17 mg/dL Final   06/24/2021 0.81 0.66 - 1.25 mg/dL Final   ]  Alkaline Phosphatase   Date/Time Value Ref Range Status   07/13/2024 03:17  40 - 150 U/L Final   06/24/2021 07:31 PM 98 40 - 150 U/L Final     No results found for: \"LABAPCBCDIFF\"                  "

## 2024-07-25 ENCOUNTER — LAB (OUTPATIENT)
Dept: LAB | Facility: CLINIC | Age: 59
End: 2024-07-25
Payer: COMMERCIAL

## 2024-07-25 ENCOUNTER — PATIENT OUTREACH (OUTPATIENT)
Dept: INFECTIOUS DISEASES | Facility: CLINIC | Age: 59
End: 2024-07-25

## 2024-07-25 ENCOUNTER — VIRTUAL VISIT (OUTPATIENT)
Dept: PHARMACY | Facility: CLINIC | Age: 59
End: 2024-07-25
Payer: COMMERCIAL

## 2024-07-25 ENCOUNTER — OFFICE VISIT (OUTPATIENT)
Dept: INFECTIOUS DISEASES | Facility: CLINIC | Age: 59
End: 2024-07-25
Attending: INTERNAL MEDICINE
Payer: COMMERCIAL

## 2024-07-25 VITALS
WEIGHT: 151 LBS | SYSTOLIC BLOOD PRESSURE: 150 MMHG | BODY MASS INDEX: 27.79 KG/M2 | DIASTOLIC BLOOD PRESSURE: 88 MMHG | HEART RATE: 85 BPM | OXYGEN SATURATION: 98 % | HEIGHT: 62 IN | TEMPERATURE: 98 F

## 2024-07-25 DIAGNOSIS — Z79.4 TYPE 2 DIABETES MELLITUS WITH HYPERGLYCEMIA, WITH LONG-TERM CURRENT USE OF INSULIN (H): Primary | ICD-10-CM

## 2024-07-25 DIAGNOSIS — Z59.819 HOUSING INSTABILITY: ICD-10-CM

## 2024-07-25 DIAGNOSIS — Z11.3 SCREEN FOR STD (SEXUALLY TRANSMITTED DISEASE): ICD-10-CM

## 2024-07-25 DIAGNOSIS — Z21 HUMAN IMMUNODEFICIENCY VIRUS I INFECTION (H): Primary | ICD-10-CM

## 2024-07-25 DIAGNOSIS — Z21 HUMAN IMMUNODEFICIENCY VIRUS I INFECTION (H): ICD-10-CM

## 2024-07-25 DIAGNOSIS — Z23 NEED FOR VACCINATION: ICD-10-CM

## 2024-07-25 DIAGNOSIS — E11.65 TYPE 2 DIABETES MELLITUS WITH HYPERGLYCEMIA, WITH LONG-TERM CURRENT USE OF INSULIN (H): Primary | ICD-10-CM

## 2024-07-25 LAB
ALBUMIN SERPL BCG-MCNC: 3.6 G/DL (ref 3.5–5.2)
ALP SERPL-CCNC: 150 U/L (ref 40–150)
ALT SERPL W P-5'-P-CCNC: 20 U/L (ref 0–70)
ANION GAP SERPL CALCULATED.3IONS-SCNC: 8 MMOL/L (ref 7–15)
AST SERPL W P-5'-P-CCNC: 20 U/L (ref 0–45)
BASOPHILS # BLD AUTO: 0 10E3/UL (ref 0–0.2)
BASOPHILS NFR BLD AUTO: 1 %
BILIRUB SERPL-MCNC: 0.3 MG/DL
BUN SERPL-MCNC: 10.4 MG/DL (ref 8–23)
CALCIUM SERPL-MCNC: 8.7 MG/DL (ref 8.8–10.4)
CD3 CELLS # BLD: 1442 CELLS/UL (ref 603–2990)
CD3 CELLS NFR BLD: 69 % (ref 49–84)
CD3+CD4+ CELLS # BLD: 467 CELLS/UL (ref 441–2156)
CD3+CD4+ CELLS NFR BLD: 22 % (ref 28–63)
CD3+CD4+ CELLS/CD3+CD8+ CLL BLD: 0.5 % (ref 1.4–2.6)
CD3+CD8+ CELLS # BLD: 933 CELLS/UL (ref 125–1312)
CD3+CD8+ CELLS NFR BLD: 45 % (ref 10–40)
CHLORIDE SERPL-SCNC: 104 MMOL/L (ref 98–107)
CHOLEST SERPL-MCNC: 120 MG/DL
CREAT SERPL-MCNC: 0.61 MG/DL (ref 0.67–1.17)
EGFRCR SERPLBLD CKD-EPI 2021: >90 ML/MIN/1.73M2
EOSINOPHIL # BLD AUTO: 0.2 10E3/UL (ref 0–0.7)
EOSINOPHIL NFR BLD AUTO: 3 %
ERYTHROCYTE [DISTWIDTH] IN BLOOD BY AUTOMATED COUNT: 12.9 % (ref 10–15)
GLUCOSE SERPL-MCNC: 355 MG/DL (ref 70–99)
HCO3 SERPL-SCNC: 21 MMOL/L (ref 22–29)
HCT VFR BLD AUTO: 32.9 % (ref 40–53)
HCV AB SERPL QL IA: NONREACTIVE
HDLC SERPL-MCNC: 33 MG/DL
HGB BLD-MCNC: 11.2 G/DL (ref 13.3–17.7)
IMM GRANULOCYTES # BLD: 0 10E3/UL
IMM GRANULOCYTES NFR BLD: 1 %
LDLC SERPL CALC-MCNC: 68 MG/DL
LYMPHOCYTES # BLD AUTO: 1.9 10E3/UL (ref 0.8–5.3)
LYMPHOCYTES NFR BLD AUTO: 30 %
MCH RBC QN AUTO: 30.1 PG (ref 26.5–33)
MCHC RBC AUTO-ENTMCNC: 34 G/DL (ref 31.5–36.5)
MCV RBC AUTO: 88 FL (ref 78–100)
MONOCYTES # BLD AUTO: 0.5 10E3/UL (ref 0–1.3)
MONOCYTES NFR BLD AUTO: 8 %
NEUTROPHILS # BLD AUTO: 3.8 10E3/UL (ref 1.6–8.3)
NEUTROPHILS NFR BLD AUTO: 57 %
NONHDLC SERPL-MCNC: 87 MG/DL
NRBC # BLD AUTO: 0 10E3/UL
NRBC BLD AUTO-RTO: 0 /100
PLATELET # BLD AUTO: 369 10E3/UL (ref 150–450)
POTASSIUM SERPL-SCNC: 3.3 MMOL/L (ref 3.4–5.3)
PROT SERPL-MCNC: 6.7 G/DL (ref 6.4–8.3)
RBC # BLD AUTO: 3.72 10E6/UL (ref 4.4–5.9)
SODIUM SERPL-SCNC: 133 MMOL/L (ref 135–145)
T CELL COMMENT: ABNORMAL
TRIGL SERPL-MCNC: 95 MG/DL
WBC # BLD AUTO: 6.4 10E3/UL (ref 4–11)

## 2024-07-25 PROCEDURE — 87536 HIV-1 QUANT&REVRSE TRNSCRPJ: CPT | Performed by: INTERNAL MEDICINE

## 2024-07-25 PROCEDURE — 250N000011 HC RX IP 250 OP 636: Performed by: INTERNAL MEDICINE

## 2024-07-25 PROCEDURE — 99606 MTMS BY PHARM EST 15 MIN: CPT | Mod: 93 | Performed by: PHARMACIST

## 2024-07-25 PROCEDURE — 90715 TDAP VACCINE 7 YRS/> IM: CPT | Performed by: INTERNAL MEDICINE

## 2024-07-25 PROCEDURE — 85025 COMPLETE CBC W/AUTO DIFF WBC: CPT | Performed by: PATHOLOGY

## 2024-07-25 PROCEDURE — 86803 HEPATITIS C AB TEST: CPT | Performed by: INTERNAL MEDICINE

## 2024-07-25 PROCEDURE — 86359 T CELLS TOTAL COUNT: CPT | Performed by: INTERNAL MEDICINE

## 2024-07-25 PROCEDURE — 90472 IMMUNIZATION ADMIN EACH ADD: CPT | Performed by: INTERNAL MEDICINE

## 2024-07-25 PROCEDURE — 80061 LIPID PANEL: CPT | Performed by: PATHOLOGY

## 2024-07-25 PROCEDURE — 80053 COMPREHEN METABOLIC PANEL: CPT | Performed by: PATHOLOGY

## 2024-07-25 PROCEDURE — 36415 COLL VENOUS BLD VENIPUNCTURE: CPT | Performed by: PATHOLOGY

## 2024-07-25 PROCEDURE — 90677 PCV20 VACCINE IM: CPT | Performed by: INTERNAL MEDICINE

## 2024-07-25 PROCEDURE — 99215 OFFICE O/P EST HI 40 MIN: CPT | Performed by: INTERNAL MEDICINE

## 2024-07-25 PROCEDURE — 99607 MTMS BY PHARM ADDL 15 MIN: CPT | Mod: 93 | Performed by: PHARMACIST

## 2024-07-25 PROCEDURE — 99000 SPECIMEN HANDLING OFFICE-LAB: CPT | Performed by: PATHOLOGY

## 2024-07-25 PROCEDURE — G0009 ADMIN PNEUMOCOCCAL VACCINE: HCPCS | Performed by: INTERNAL MEDICINE

## 2024-07-25 PROCEDURE — 86360 T CELL ABSOLUTE COUNT/RATIO: CPT | Performed by: INTERNAL MEDICINE

## 2024-07-25 PROCEDURE — G0463 HOSPITAL OUTPT CLINIC VISIT: HCPCS | Mod: 25 | Performed by: INTERNAL MEDICINE

## 2024-07-25 RX ORDER — PROCHLORPERAZINE 25 MG/1
1 SUPPOSITORY RECTAL
Qty: 3 EACH | Refills: 11 | Status: SHIPPED | OUTPATIENT
Start: 2024-07-25

## 2024-07-25 RX ORDER — PROCHLORPERAZINE 25 MG/1
1 SUPPOSITORY RECTAL
Qty: 1 EACH | Refills: 3 | Status: SHIPPED | OUTPATIENT
Start: 2024-07-25

## 2024-07-25 RX ORDER — BICTEGRAVIR SODIUM, EMTRICITABINE, AND TENOFOVIR ALAFENAMIDE FUMARATE 50; 200; 25 MG/1; MG/1; MG/1
1 TABLET ORAL DAILY
Qty: 30 TABLET | Refills: 2 | Status: SHIPPED | OUTPATIENT
Start: 2024-07-25 | End: 2024-07-26

## 2024-07-25 RX ADMIN — TETANUS TOXOID, REDUCED DIPHTHERIA TOXOID AND ACELLULAR PERTUSSIS VACCINE, ADSORBED 0.5 ML: 5; 2.5; 8; 8; 2.5 SUSPENSION INTRAMUSCULAR at 10:10

## 2024-07-25 RX ADMIN — PNEUMOCOCCAL 20-VALENT CONJUGATE VACCINE 0.5 ML
2.2; 2.2; 2.2; 2.2; 2.2; 2.2; 2.2; 2.2; 2.2; 2.2; 2.2; 2.2; 2.2; 2.2; 2.2; 2.2; 4.4; 2.2; 2.2; 2.2 INJECTION, SUSPENSION INTRAMUSCULAR at 10:09

## 2024-07-25 SDOH — ECONOMIC STABILITY - HOUSING INSECURITY: HOUSING INSTABILITY UNSPECIFIED: Z59.819

## 2024-07-25 ASSESSMENT — PAIN SCALES - GENERAL: PAINLEVEL: NO PAIN (0)
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
## 2024-07-25 NOTE — PROGRESS NOTES
Medication Therapy Management (MTM) Encounter    ASSESSMENT:                            Medication Adherence/Access: will send updated refills so he can  today.    Diabetes:  Needs refill of Dexcom transmitter. Attempted to set up ability to share readings with the clinic but he declined doing that today and just wanted his insulin refilled. A1c is chronically elevated >10% and no hypos so will refill at current dose. Storage concern with insulin since he's homeless and it's summer. Not a good candidate for GLP-1 RA due to history of gastroparesis and bowel obstruction and lack of consistent follow-up. Does not want to resume glipizide today, just the insulin.     HIV:   Repeat labs pending    PLAN:                            Refill Lantus at 27 units 2 times daily   Lantus pens can be stored at room temperature for up to 28 days and then need to be discarded. They should not get too hot or too cold. Store at a temperature below 86 degrees Farenheit.   Refill transmitter at pharmacy     Follow-up: 2 weeks    SUBJECTIVE/OBJECTIVE:                          Andrew Collier is a 58 year old male called for a follow-up visit    Reason for visit: diabetes follow-up.    Allergies/ADRs: Reviewed in chart  Past Medical History: Reviewed in chart  Tobacco: He reports that he has been smoking cigarettes. He has a 10 pack-year smoking history. He has quit using smokeless tobacco.Nicotine/Tobacco Cessation Plan  Didn't discuss today  Recently kicked out of housing and is currently homeless.  Doesn't have a working phone   Alcohol: no    Medication Adherence/Access:   He has program HH   Only has Biktarvy right now. Needs refill of his other meds.     Diabetes     Ran out of insulin and CGM supplies a week ago. Was using 27 units 2 times daily. Denies hypos as this dose. He has the Dexcom sensors but ran out of the transmitters.     Previous trials:   - metformin: refuses to take due to history of side effects   -  dulaglutide: rash.   - glimeperide and januvia: non-adherence  - jardiance: may have caused wounds on legs; unclear history    Current diabetes symptoms: losing fat in arms and fatigue. History of gastroparesis and bowel obstructions.  Diet/Exercise: food and housing insecurity.     Eye exam in the last 12 months? No  Foot exam: due  Urine Albumin:   Lab Results   Component Value Date    UMALCR 20.66 (H) 02/11/2020      Lab Results   Component Value Date    A1C 11.7 (H) 07/13/2024     Component      Latest Ref Rng 7/14/2024  1:28 PM 7/14/2024  8:45 PM 7/15/2024  1:04 AM 7/15/2024  5:22 AM 7/15/2024  9:22 AM   GLUCOSE BY METER POCT      70 - 99 mg/dL 243 (H)  244 (H)  173 (H)  217 (H)  181 (H)      Component      Latest Ref Rng 7/15/2024  12:09 PM 7/15/2024  4:58 PM   GLUCOSE BY METER POCT      70 - 99 mg/dL 176 (H)  305 (H)         HIV:   Biktarvy once daily   Side effects: none  Diagnosis: 2015  Past regimens: Triumeq, Genvoya, Biktarvy since 2019  Mutations:               NRTI: none              NNRTI: V179D (pan-sensitive)               PI: E35D (pan-sensitive)   HIV VL: 170 on 2/6/24  CD4: 534 on 2/6/24  Treponema Ab: nonreactive 2/6/24  Immunizations: due for covid, flu, shingrix      Today's Vitals: There were no vitals taken for this visit.  ----------------  MED REC REQUIRED  Post Medication Reconciliation Status: discharge medications reconciled, continue medications without change    I spent 30 minutes with this patient today. All changes were made via collaborative practice agreement with Dr. Ivory. A copy of the visit note was provided to the patient's provider(s).    A summary of these recommendations was declined.       Medication Therapy Recommendations  Type 2 diabetes mellitus (H)    Current Medication: insulin glargine (LANTUS PEN) 100 UNIT/ML pen   Rationale: Dose too low - Dosage too low - Effectiveness   Recommendation: Increase Dose   Status: Accepted per CPA

## 2024-07-25 NOTE — NURSING NOTE
"Chief Complaint   Patient presents with    RECHECK     RETURN B20 - Follow up         BP (!) 150/88 (BP Location: Right arm)   Pulse 85   Temp 98  F (36.7  C) (Oral)   Ht 1.575 m (5' 2\")   Wt 68.5 kg (151 lb)   SpO2 98%   BMI 27.62 kg/m    Camila Navas, CMA on 7/25/2024 at 9:42 AM    "

## 2024-07-25 NOTE — TELEPHONE ENCOUNTER
Social Work - Intervention  [facility]  Data/Intervention: 2024    Patient Name: Andrew Collier Goes By: Andrew BUCHANANB/Age: 1965 (58 year old)     Visit Type: In Person   Referral Source: Walk In   Reason for Referral: Resources      Psychosocial Information/Concerns:  Patient walked into clinic requesting Social Work.      Intervention/Education/Resources Provided:  Writer met with Patient who stated he lost his housing and needs a referral to get back into [facility]. Patient also notes he needs insulin. Patient showed Writer that he picked up his mediation from the Pharmacy. Patient is currently sleeping outside with no access to fridge at this time. Writer arranged with RN Team to schedule Patient for labs and appointments with Provider/MTM. Writer researched how to refer to [facility], per patient request. It is a closed referral system and street outreach teams are able to make the referral. 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     After meeting with Provider and MTM Patient requested Social Work again. Patient reports that Writer needs to help him get EBT. Writer inquired the status and Patient reports that he needs a doctor to sign a form stating he needs it. Writer inquired what form and Patient was unable to identify. Writer encouraged Patient to apply for benefits in person again and obtain the correct form. Writer stated once he obtains the form he needs the clinic can look at it to see if we are able to sign as Patient has not established a PCP.       Assessment/Plan:  Patient met with Dr. Pierre at 9:30 am and had a call with [facility] Pharmacist, Dorie Holland, at 10 am. Patient completed labs and vaccines today. 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 Patient will connect with Writer and Clinic for the form once he obtains the correct form. Nothing  further as this time.

## 2024-07-25 NOTE — PROGRESS NOTES
[facility] [address] [address]  Phone: [phone]  Fax: [phone]    Patient:  Andrew Collier, Date of birth 1965  Date of Visit:  07/25/2024  Referring Provider No ref. provider found  Reason for visit: HIV follow up      Assessment & Plan    Human immunodeficiency virus I infection (H)  - unstable housing but has managed to  and adhere to the Biktarvy. CD4 is 467. Viral load is pending.   - Labs pending from today, added on lipid profile, RPR, Hepatitis C Ab  - refills of Biktarvy provided  - bictegravir-emtricitabine-tenofovir (BIKTARVY) -25 MG per tablet  Dispense: 30 tablet; Refill: 2  - Hepatitis C antibody    Screen for STD (sexually transmitted disease)  - not interested in GC/CT screening today. Sexually active and uses condoms.   - Hepatitis C antibody    Need for vaccination  - TDAP and prevnar 20 vaccinations today    Housing instability:   - He is connected with street outreach program who we anticipate will help him find housing.    Uncontrolled type II DM:  This is a complicated situation. He has uncontrolled type II DM and I been told that PCP and endocrinology clinics have verbalized that he can't be seen due to lack of following up at appointments. I am concerned that he is on insulin but does not have a way to view his blood sugars. He intermittently has a  glucose sensor but doesn't have a good way to view his results on the phone. A reassuring piece is he is aware of his hypo- and hypoglycemic issues. I will work with our [facility] pharmacist about a potental oral regimen and/or adjusting his subcutaneous insulin regimen. I would prefer not to manage his DM but there is not currently a provider managing this. I also messaged the last endocrinology provider he saw.      Follow up in 3 -6 months w/ labs before      54 minutes spent by me on the date of the encounter doing chart review, review of  "test results, interpretation of tests, patient visit, documentation, and discussion with other provider(s)       History of Present Illness     Pertinent history obtain from: chart review and patient  Last seen 24. Added on to my clinic last minute today.   Diagnosed w/ HIV in , previously on triumeq, genvoya. Now on Biktarvy.   He has lost access to his apartment after about 5 months. Currently he is unhoused and sleeping on the street. He worked with a street outreach program. He is aware of who to contact in this program to help with housing.   He endorses using inhaled and IV meth but has not used in the past 1 year. Although during our exam today he is falling asleep easily, has slurred speech, and speak is incomprehensible at times.  He is sexually active, uses condoms. No interested in STD testing.   He is also on insulin for DM. He needs insulin. He has glucose sensor but per nursing it is not attached correctly. He does not have a cell phone to view what his glucose levels are. Hypoglycemia symptoms are diaphoresis and hyperglycemia symptoms are the symptom of feeling lightheaded.   Although his housing situation is unstable he is able to fill his prescriptions.     HIV History:  Date of Diagnosis: 2015   Approximated time of transmission:   CD4 Josue: 24/2%  Viral Load at Diagnosis: 171,654  Opportunistic Infections: CNS toxoplasmosis   CMV Status: seropositive   Toxo Status: seropositive , h/o CNS toxoplasmosis s/p Clindamycin and Pyrimethamine then transitioned to Bactrim ppx now off of ppx   Tuberculosis Screenin negative   Historical use of ARVs: 2015 started Triumeq, 2015 switched to Genvoya, 2019 switched to Biktarvy   Historical Resistance Mutations: not in system 2018 genotype cancelled given undetectable    Physical Exam   Vital signs:  BP (!) 150/88 (BP Location: Right arm)   Pulse 85   Temp 98  F (36.7  C) (Oral)   Ht 1.575 m (5' 2\")   Wt 68.5 kg (151 lb)   " SpO2 98%   BMI 27.62 kg/m      GENERAL: disheveled appearing, falling asleep at times during our conversation. He has blue ink on his lips.   NECK: no adenopathy  RESP: lungs clear to auscultation - no rales, rhonchi or wheezes  CV: regular rate and rhythm, normal S1 S2, no murmur,no peripheral edema  ABDOMEN: soft, nontender, n no masses and bowel sounds normal  MS: no gross musculoskeletal defects noted, no edema  NEURO: speech is incomprehensible at times, slurred speech, suspect some sort of intoxication, no focal deficits  PSYCH:     Data   Laboratory data and imaging listed below was reviewed prior to this encounter.     Microbiology:    Culture   Date Value Ref Range Status   09/25/2023 3+ Streptococcus pyogenes (Group A Streptococcus) (A)  Final     Comment:     This organism is susceptible to ampicillin, penicillin, vancomycin and the cephalosporins. If treatment is required and your patient is allergic to penicillin, contact the microbiology lab within 5 days to request susceptibility testing.   09/25/2023 2+ Staphylococcus aureus (A)  Final   09/25/2023 (A)  Final    1+ Streptococcus agalactiae (Group B Streptococcus)     Comment:     This organism is susceptible to ampicillin, penicillin, vancomycin and the cephalosporins. If treatment is required and your patient is allergic to penicillin, contact the microbiology lab within 5 days to request susceptibility testing.   09/25/2023 2+ Normal sushma  Final   02/16/2023 No Growth  Final   02/16/2023 No Growth  Final   02/06/2023 No Growth  Final   02/06/2023 No Growth  Final   12/17/2022 No Growth  Final   12/17/2022 No Growth  Final   12/08/2022 No Growth  Final   12/08/2022 No Growth  Final   04/29/2022 No Growth  Final   , Inflammatory Markers:   Recent Labs   Lab Test 02/19/22  0451 02/18/22  1603 01/22/22  1640 10/22/21  0600 12/19/20  2153 02/26/20  0552 02/25/20  0637 02/14/20  1032 10/06/18  0658 10/05/18  0818   SED  --   --   --   --  10  --  21*  18  --  23*   CRP 5.6 4.1 34.0* <2.9 7.2 7.9 17.0* 5.0   < > 14.0*    < > = values in this interval not displayed.   , Hematology Studies:    Recent Labs   Lab Test 07/25/24  0934 07/15/24  0609 07/14/24  1107 07/13/24  1517 05/18/24  1443 02/22/24  0057 07/13/21  1100 06/24/21  1931 06/15/21  1153 03/10/21  2020 01/21/21  0243 01/10/21  1413 12/19/20  2153   WBC 6.4 8.1 9.0 11.2* 9.8 7.3   < > 9.6 7.4 12.0* 9.6 9.2 8.8   ANEU  --   --   --   --   --   --   --  5.6 4.1 8.1 6.7 6.1 5.2   AEOS  --   --   --   --   --   --   --  0.1 0.2 0.2 0.2 0.2 0.3   HGB 11.2* 13.3 14.5 15.6 15.6 13.5   < > 13.5 14.3 16.0 14.1 14.7 16.2   MCV 88 89 91 89 86 84   < > 87 87 89 90 90 93    323 322 431 482* 329   < > 386 355 437 359 361 356    < > = values in this interval not displayed.    , Metabolic Studies:   Recent Labs   Lab Test 07/25/24  0934 07/15/24  0609 07/14/24 1107 07/13/24 1517 05/18/24  1443   * 134* 134* 136 138   POTASSIUM 3.3* 3.6 4.0 4.1 3.9   CHLORIDE 104 103 103 99 102   CO2 21* 23 20* 26 22   BUN 10.4 13.1 15.7 18.0 34.1*   CR 0.61* 0.63* 0.63* 0.81 1.01   GFRESTIMATED >90 >90 >90 >90 85   , Hepatic Studies:   Recent Labs   Lab Test 07/25/24  0934 07/13/24  1517 05/18/24  1443 02/22/24  0057 02/06/24  1200 02/05/24  1207 11/09/23  0706   BILITOTAL 0.3 0.8 1.4* 0.5 0.5 1.4* 1.1   ALKPHOS 150 129 129 161* 166* 110 138*   ALBUMIN 3.6 4.4 4.7 3.9 4.0 4.3 4.6   AST 20 18 35 23 31  --  17   ALT 20 18 28 21 24 20 14   , CD4:   Recent Labs   Lab Test 07/25/24  0934 02/06/24  1200 08/07/23  0647 05/06/23  1552 01/22/22  1640 08/24/21  1621   CD3T 69 67 73 69   < > 63   ACD3 1,442 1,531 1,629 1,121   < > 1,447   CD8 45* 41* 47* 46*   < > 39   ACD8 933 945 1,048 741   < > 888   CD4 22* 23* 24* 20*   < > 22*   ACD4 467 534 525 330*   < > 519   CDTHTS 0.50* 0.56* 0.50* 0.45*   < > 0.58*   TCELLCOMM  --   --   --  This result contains rich text formatting which cannot be displayed here.  --   --     < > = values  in this interval not displayed.    and HIV RNA:   Recent Labs   Lab Test 02/06/24  1200 08/07/23  0647 10/13/22  1551 05/24/22  1051 08/24/21  1622 06/15/21  1153 08/06/20  0842 02/11/20  1711 10/31/19  1120 06/07/19  0827 04/15/19  1508 01/01/19  1822 11/06/18  1055 08/15/18  1402 07/19/18  1147 04/26/18  1206 03/07/18  0745 12/12/17  1306 08/11/17  1343 02/22/17  1247 11/07/16  1708 08/04/16  1141 05/20/16  1247   HIQTINST 170* 19,900*  --   --   --   --   --   --   --   --   --   --   --   --   --   --   --   --   --   --   --   --   --    HILOG 2.2 4.3  --   --  <1.3 Not Calculated Not Calculated Not Calculated 1.9* 2.2* 1.4* <1.3 2.6* <1.3 <1.3 Not Calculated 4.2* 1.7* Not Calculated <1.3 Not Calculated <1.3 <1.3   HIQT  --   --  Not Detected Not Detected <20* HIV-1 RNA Not Detected HIV-1 RNA Not Detected HIV-1 RNA Not Detected 79* 147* 28* <20* 431* <20* <20* HIV-1 RNA Not Detected 17,736* 52* HIV-1 RNA Not Detected <20  HIV-1 RNA Detected, less than 20 HIV-1 RNA copies/mL   The DAMON AmpliPrep/DAMON TaqMan HIV-1 test is an FDA-approved in vitro nucleic   acid amplification test for the quantitation of HIV-1 RNA in human plasma (EDTA   plasma) using the DAMON AmpliPrep instrument for automated viral nucleic acid   extraction and the DAMON TaqMan Analyzer or DAMON TaqMan for automated Real   Time PCR amplification and detection of the viral nucleic acid target.   Titer results are reported in copies/ml. This assay is intended for use in   conjunction with clinical presentation and other laboratory markers of disease   prognosis and for use as an aid in assessing viral response to antiretroviral   treatment as measured by changes in plasma HIV-1 RNA levels. This test should   not be used as a donor screening test to confirm the presence of HIV-1   infection.  * HIV-1 RNA Not Detected   The DAMON AmpliPrep/DAMON TaqMan HIV-1 test is an FDA-approved in vitro nucleic   acid amplification test for the quantitation  of HIV-1 RNA in human plasma (EDTA   plasma) using the DAMON AmpliPrep instrument for automated viral nucleic acid   extraction and the DAMON TaqMan Analyzer or [employer] TaqMan for automated Real   Time PCR amplification and detection of the viral nucleic acid target.   Titer results are reported in copies/ml. This assay is intended for use in   conjunction with clinical presentation and other laboratory markers of disease   prognosis and for use as an aid in assessing viral response to antiretroviral   treatment as measured by changes in plasma HIV-1 RNA levels. This test should   not be used as a donor screening test to confirm the presence of HIV-1   infection.   <20  HIV-1 RNA Detected, less than 20 HIV-1 RNA copies/mL   The DAMON AmpliPrep/DAMON TaqMan HIV-1 test is an FDA-approved in vitro nucleic   acid amplification test for the quantitation of HIV-1 RNA in human plasma (EDTA   plasma) using the DAMON AmpliPrep instrument for automated viral nucleic acid   extraction and the DAMON TaqMan Analyzer or [employer] TaqMan for automated Real   Time PCR amplification and detection of the viral nucleic acid target.   Titer results are reported in copies/ml. This assay is intended for use in   conjunction with clinical presentation and other laboratory markers of disease   prognosis and for use as an aid in assessing viral response to antiretroviral   treatment as measured by changes in plasma HIV-1 RNA levels. This test should   not be used as a donor screening test to confirm the presence of HIV-1   infection.  * <20  HIV-1 RNA Detected, less than 20 HIV-1 RNA copies/mL   The DAMON AmpliPrep/DAMON TaqMan HIV-1 test is an FDA-approved in vitro nucleic   acid amplification test for the quantitation of HIV-1 RNA in human plasma (EDTA   plasma) using the DAMON AmpliPrep instrument for automated viral nucleic acid   extraction and the DAMON TaqMan Analyzer or DAMON TaqMan for automated Real   Time PCR amplification and  detection of the viral nucleic acid target.   Titer results are reported in copies/ml. This assay is intended for use in   conjunction with clinical presentation and other laboratory markers of disease   prognosis and for use as an aid in assessing viral response to antiretroviral   treatment as measured by changes in plasma HIV-1 RNA levels. This test should   not be used as a donor screening test to confirm the presence of HIV-1   infection.  *   , and Hepatitis C Testing:   Hepatitis C Antibody   Date Value Ref Range Status   07/25/2024 Nonreactive Nonreactive Final     Comment:     A nonreactive screening test result does not exclude the possibility of exposure to or infection with HCV. Nonreactive screening test results in individuals with prior exposure to HCV may be due to antibody levels below the limit of detection of this assay or lack of reactivity to the HCV antigens used in this assay. Patients with recent HCV infections (<3 months from time of exposure) may have false-negative HCV antibody results due to the time needed for seroconversion (average of 8 to 9 weeks).   10/13/2022 Nonreactive Nonreactive Final   09/25/2020 Nonreactive NR^Nonreactive Final     Comment:     Assay performance characteristics have not been established for newborns,   infants, and children     02/11/2020 Nonreactive NR^Nonreactive Final     Comment:     Assay performance characteristics have not been established for newborns,   infants, and children

## 2024-07-26 ENCOUNTER — HOSPITAL ENCOUNTER (EMERGENCY)
Facility: CLINIC | Age: 59
Discharge: HOME | End: 2024-07-26
Attending: STUDENT IN AN ORGANIZED HEALTH CARE EDUCATION/TRAINING PROGRAM | Admitting: STUDENT IN AN ORGANIZED HEALTH CARE EDUCATION/TRAINING PROGRAM
Payer: COMMERCIAL

## 2024-07-26 VITALS
RESPIRATION RATE: 18 BRPM | DIASTOLIC BLOOD PRESSURE: 77 MMHG | HEART RATE: 87 BPM | WEIGHT: 156.8 LBS | OXYGEN SATURATION: 99 % | TEMPERATURE: 98 F | BODY MASS INDEX: 26.77 KG/M2 | HEIGHT: 64 IN | SYSTOLIC BLOOD PRESSURE: 149 MMHG

## 2024-07-26 DIAGNOSIS — Z21 HUMAN IMMUNODEFICIENCY VIRUS I INFECTION (H): ICD-10-CM

## 2024-07-26 DIAGNOSIS — Z79.4 TYPE 2 DIABETES MELLITUS WITH HYPERGLYCEMIA, WITH LONG-TERM CURRENT USE OF INSULIN (H): ICD-10-CM

## 2024-07-26 DIAGNOSIS — E11.65 TYPE 2 DIABETES MELLITUS WITH HYPERGLYCEMIA, WITH LONG-TERM CURRENT USE OF INSULIN (H): ICD-10-CM

## 2024-07-26 DIAGNOSIS — K56.609 SBO (SMALL BOWEL OBSTRUCTION) (H): ICD-10-CM

## 2024-07-26 DIAGNOSIS — K21.9 GASTROESOPHAGEAL REFLUX DISEASE, UNSPECIFIED WHETHER ESOPHAGITIS PRESENT: ICD-10-CM

## 2024-07-26 DIAGNOSIS — R73.9 HYPERGLYCEMIA: ICD-10-CM

## 2024-07-26 LAB
ALBUMIN UR-MCNC: NEGATIVE MG/DL
ANION GAP SERPL CALCULATED.3IONS-SCNC: 14 MMOL/L (ref 7–15)
APPEARANCE UR: CLEAR
BASE EXCESS BLDV CALC-SCNC: -1.1 MMOL/L (ref -3–3)
BASOPHILS # BLD AUTO: 0 10E3/UL (ref 0–0.2)
BASOPHILS NFR BLD AUTO: 1 %
BILIRUB UR QL STRIP: NEGATIVE
BUN SERPL-MCNC: 12.6 MG/DL (ref 8–23)
CALCIUM SERPL-MCNC: 8.8 MG/DL (ref 8.8–10.4)
CHLORIDE SERPL-SCNC: 103 MMOL/L (ref 98–107)
COLOR UR AUTO: ABNORMAL
CREAT SERPL-MCNC: 0.76 MG/DL (ref 0.67–1.17)
EGFRCR SERPLBLD CKD-EPI 2021: >90 ML/MIN/1.73M2
EOSINOPHIL # BLD AUTO: 0.1 10E3/UL (ref 0–0.7)
EOSINOPHIL NFR BLD AUTO: 2 %
ERYTHROCYTE [DISTWIDTH] IN BLOOD BY AUTOMATED COUNT: 12.9 % (ref 10–15)
GLUCOSE BLDC GLUCOMTR-MCNC: 372 MG/DL (ref 70–99)
GLUCOSE SERPL-MCNC: 403 MG/DL (ref 70–99)
GLUCOSE UR STRIP-MCNC: >=1000 MG/DL
HCO3 BLDV-SCNC: 24 MMOL/L (ref 21–28)
HCO3 SERPL-SCNC: 20 MMOL/L (ref 22–29)
HCT VFR BLD AUTO: 33.2 % (ref 40–53)
HGB BLD-MCNC: 11.8 G/DL (ref 13.3–17.7)
HGB UR QL STRIP: NEGATIVE
IMM GRANULOCYTES # BLD: 0.1 10E3/UL
IMM GRANULOCYTES NFR BLD: 1 %
KETONES UR STRIP-MCNC: NEGATIVE MG/DL
LEUKOCYTE ESTERASE UR QL STRIP: NEGATIVE
LYMPHOCYTES # BLD AUTO: 2.2 10E3/UL (ref 0.8–5.3)
LYMPHOCYTES NFR BLD AUTO: 33 %
MCH RBC QN AUTO: 30.9 PG (ref 26.5–33)
MCHC RBC AUTO-ENTMCNC: 35.5 G/DL (ref 31.5–36.5)
MCV RBC AUTO: 87 FL (ref 78–100)
MONOCYTES # BLD AUTO: 0.4 10E3/UL (ref 0–1.3)
MONOCYTES NFR BLD AUTO: 6 %
NEUTROPHILS # BLD AUTO: 3.8 10E3/UL (ref 1.6–8.3)
NEUTROPHILS NFR BLD AUTO: 57 %
NITRATE UR QL: NEGATIVE
NRBC # BLD AUTO: 0 10E3/UL
NRBC BLD AUTO-RTO: 0 /100
O2/TOTAL GAS SETTING VFR VENT: 0 %
OXYHGB MFR BLDV: 72 % (ref 70–75)
PCO2 BLDV: 41 MM HG (ref 40–50)
PH BLDV: 7.38 [PH] (ref 7.32–7.43)
PH UR STRIP: 6 [PH] (ref 5–7)
PLATELET # BLD AUTO: 427 10E3/UL (ref 150–450)
PO2 BLDV: 40 MM HG (ref 25–47)
POTASSIUM SERPL-SCNC: 3.4 MMOL/L (ref 3.4–5.3)
RBC # BLD AUTO: 3.82 10E6/UL (ref 4.4–5.9)
RBC URINE: 0 /HPF
SAO2 % BLDV: 73.9 % (ref 70–75)
SODIUM SERPL-SCNC: 137 MMOL/L (ref 135–145)
SP GR UR STRIP: 1.03 (ref 1–1.03)
UROBILINOGEN UR STRIP-MCNC: NORMAL MG/DL
WBC # BLD AUTO: 6.7 10E3/UL (ref 4–11)
WBC URINE: <1 /HPF

## 2024-07-26 PROCEDURE — 82805 BLOOD GASES W/O2 SATURATION: CPT | Performed by: STUDENT IN AN ORGANIZED HEALTH CARE EDUCATION/TRAINING PROGRAM

## 2024-07-26 PROCEDURE — 80048 BASIC METABOLIC PNL TOTAL CA: CPT | Performed by: STUDENT IN AN ORGANIZED HEALTH CARE EDUCATION/TRAINING PROGRAM

## 2024-07-26 PROCEDURE — 81001 URINALYSIS AUTO W/SCOPE: CPT | Performed by: STUDENT IN AN ORGANIZED HEALTH CARE EDUCATION/TRAINING PROGRAM

## 2024-07-26 PROCEDURE — 85025 COMPLETE CBC W/AUTO DIFF WBC: CPT | Performed by: STUDENT IN AN ORGANIZED HEALTH CARE EDUCATION/TRAINING PROGRAM

## 2024-07-26 PROCEDURE — 99284 EMERGENCY DEPT VISIT MOD MDM: CPT | Performed by: STUDENT IN AN ORGANIZED HEALTH CARE EDUCATION/TRAINING PROGRAM

## 2024-07-26 PROCEDURE — 96360 HYDRATION IV INFUSION INIT: CPT | Performed by: STUDENT IN AN ORGANIZED HEALTH CARE EDUCATION/TRAINING PROGRAM

## 2024-07-26 PROCEDURE — 99284 EMERGENCY DEPT VISIT MOD MDM: CPT | Mod: 25 | Performed by: STUDENT IN AN ORGANIZED HEALTH CARE EDUCATION/TRAINING PROGRAM

## 2024-07-26 PROCEDURE — 36415 COLL VENOUS BLD VENIPUNCTURE: CPT | Performed by: STUDENT IN AN ORGANIZED HEALTH CARE EDUCATION/TRAINING PROGRAM

## 2024-07-26 PROCEDURE — 258N000003 HC RX IP 258 OP 636: Performed by: STUDENT IN AN ORGANIZED HEALTH CARE EDUCATION/TRAINING PROGRAM

## 2024-07-26 PROCEDURE — 82962 GLUCOSE BLOOD TEST: CPT

## 2024-07-26 RX ORDER — GLIPIZIDE 10 MG/1
10 TABLET, FILM COATED, EXTENDED RELEASE ORAL DAILY
Qty: 90 TABLET | Refills: 1 | Status: SHIPPED | OUTPATIENT
Start: 2024-07-26

## 2024-07-26 RX ORDER — POLYETHYLENE GLYCOL 3350 17 G/17G
17 POWDER, FOR SOLUTION ORAL 2 TIMES DAILY
Qty: 510 G | Refills: 0 | Status: ON HOLD | OUTPATIENT
Start: 2024-07-26 | End: 2024-09-30

## 2024-07-26 RX ORDER — FAMOTIDINE 40 MG/1
40 TABLET, FILM COATED ORAL DAILY
Qty: 30 TABLET | Refills: 2 | Status: SHIPPED | OUTPATIENT
Start: 2024-07-26

## 2024-07-26 RX ORDER — BICTEGRAVIR SODIUM, EMTRICITABINE, AND TENOFOVIR ALAFENAMIDE FUMARATE 50; 200; 25 MG/1; MG/1; MG/1
1 TABLET ORAL DAILY
Qty: 30 TABLET | Refills: 2 | Status: SHIPPED | OUTPATIENT
Start: 2024-07-26

## 2024-07-26 RX ADMIN — SODIUM CHLORIDE 1000 ML: 9 INJECTION, SOLUTION INTRAVENOUS at 20:10

## 2024-07-26 ASSESSMENT — COLUMBIA-SUICIDE SEVERITY RATING SCALE - C-SSRS
1. IN THE PAST MONTH, HAVE YOU WISHED YOU WERE DEAD OR WISHED YOU COULD GO TO SLEEP AND NOT WAKE UP?: NO
6. HAVE YOU EVER DONE ANYTHING, STARTED TO DO ANYTHING, OR PREPARED TO DO ANYTHING TO END YOUR LIFE?: NO
2. HAVE YOU ACTUALLY HAD ANY THOUGHTS OF KILLING YOURSELF IN THE PAST MONTH?: NO

## 2024-07-26 ASSESSMENT — ACTIVITIES OF DAILY LIVING (ADL)
ADLS_ACUITY_SCORE: 37
ADLS_ACUITY_SCORE: 37
ADLS_ACUITY_SCORE: 35

## 2024-07-27 LAB
HIV1 RNA # PLAS NAA DL=20: 174 COPIES/ML
HIV1 RNA SERPL NAA+PROBE-LOG#: 2.2 {LOG_COPIES}/ML

## 2024-07-27 NOTE — ED TRIAGE NOTES
Triage Assessment (Adult)       Row Name 07/26/24 1926          Triage Assessment    Airway WDL WDL        Respiratory WDL    Respiratory WDL WDL        Skin Circulation/Temperature WDL    Skin Circulation/Temperature WDL WDL        Cardiac WDL    Cardiac WDL WDL        Peripheral/Neurovascular WDL    Peripheral Neurovascular WDL WDL        Cognitive/Neuro/Behavioral WDL    Cognitive/Neuro/Behavioral WDL WDL

## 2024-07-27 NOTE — DISCHARGE INSTRUCTIONS
You were seen in the emergency room for high blood sugar, and your labs showed high blood sugar but not dangerously so  I have prescribed all of your home medications to our pharmacy as you requested  If you develop any new or worsening symptoms including but not limited to chest pain, shortness of breath, weakness, vomiting please return immediately to the emergency room

## 2024-07-27 NOTE — ED PROVIDER NOTES
[facility] [place] EMERGENCY DEPARTMENT ([facility])    7/26/24      ED PROVIDER NOTE     History     Chief Complaint   Patient presents with    Hyperglycemia     Pt states they have been unable to get their insulin since Wednesday due to insurance issues, has been feeling fatigued and urinating frequently.  in triage.      HPI  Andrew Collier is a 58 year old male with  history of DM II, HIV, last CD4 count 534 2/6/2024 polysubstance abuse who presents to the emergency department seeking evaluation of hyperglycemia.  Patient states that he has been unable to take his insulin (Lantus) since Wednesday (7/24/2024) due to insurance concerns.  Patient typically takes 27 units of Lantus twice daily. He also reports taking glipizide.  Patient reports that has been voiding more than usual and since his last evaluation on 7/21, patient states that he has lost 3 pounds.      Past Medical History  Past Medical History:   Diagnosis Date    Acute appendicitis with localized peritonitis 1/31/2018    AIDS (H)     Allergic rhinitis due to other allergen     DNS    Anal dysplasia     Chronic abdominal pain     CNS toxoplasmosis (H)     COVID-19 1/11/2022    Diabetes type 2, controlled (H)     GERD (gastroesophageal reflux disease)     Herpes zoster 9/23/2016    History of seizure     History of substance abuse (H)     HIV (human immunodeficiency virus infection) (H)     HLD (hyperlipidemia)     Lung nodules     Periungual wart     Pneumonia 1/6/2019    PTSD (post-traumatic stress disorder)     Sleep apnea     doesn't use CPAP     Past Surgical History:   Procedure Laterality Date    ANOSCOPY N/A 9/9/2020    Procedure: Exam Under Anesthesia, ANOSCOPY, fulgeration of rectal fissures with Rectal Biopsies;  Surgeon: Thanh Lundberg MD;  Location: [facility] OR    COLONOSCOPY Left 1/22/2016    Procedure: COMBINED COLONOSCOPY, SINGLE OR MULTIPLE BIOPSY/POLYPECTOMY BY BIOPSY;  Surgeon: Clark Saini MD;  Location: [facility] GI     ESOPHAGOSCOPY, GASTROSCOPY, DUODENOSCOPY (EGD), COMBINED N/A 6/6/2022    Procedure: ESOPHAGOGASTRODUODENOSCOPY, WITH BIOPSY;  Surgeon: Kecia Benítez MD;  Location: [facility] GI    HC EXPLORE UNDESC TESTIS,INGUIN/SCROTAL      LAPAROSCOPIC APPENDECTOMY N/A 1/31/2018    Procedure: LAPAROSCOPIC APPENDECTOMY;  LAPAROSCOPIC APPENDECTOMY;  Surgeon: Dawn Holt MD;  Location: [facility] OR    LAPAROSCOPY DIAGNOSTIC (GENERAL) N/A 7/26/2016    Procedure: LAPAROSCOPY DIAGNOSTIC (GENERAL);  Surgeon: Susannah Arriaga MD;  Location: [facility] OR    LAPAROSCOPY DIAGNOSTIC (GENERAL) N/A 4/16/2018    Procedure: LAPAROSCOPY DIAGNOSTIC (GENERAL);  Diagnostic laparoscopy and lysis of adhesions;  Surgeon: Prince Dowling MD;  Location: [facility] OR    OPTICAL TRACKING SYSTEM CRANIOTOMY, EXCISE TUMOR, COMBINED Left 4/10/2015    Procedure: COMBINED OPTICAL TRACKING SYSTEM CRANIOTOMY, EXCISE TUMOR;  Surgeon: Mirlande Colmenares MD;  Location: [facility] OR    REPAIR GAMEKEEPER'S THUMB Right 12/2/2016    Procedure: REPAIR LIGAMENT ULNAR COLLATERAL THUMB (GAMEKEEPER'S);  Surgeon: Evin Zamorano MD;  Location: [facility] OR    ZZC NONSPECIFIC PROCEDURE      right forearm fracture     bictegravir-emtricitabine-tenofovir (BIKTARVY) -25 MG per tablet  blood glucose (NO BRAND SPECIFIED) lancets standard  blood glucose (NO BRAND SPECIFIED) test strip  blood glucose monitoring (NO BRAND SPECIFIED) meter device kit  famotidine (PEPCID) 40 MG tablet  glipiZIDE (GLUCOTROL XL) 10 MG 24 hr tablet  insulin glargine (LANTUS PEN) 100 UNIT/ML pen  insulin pen needle (32G X 4 MM) 32G X 4 MM miscellaneous  Nutritional Supplements (ENSURE ACTIVE) LIQD  polyethylene glycol (MIRALAX) 17 GM/Dose powder  cephALEXin (KEFLEX) 500 MG capsule  Continuous Glucose Sensor (DEXCOM G6 SENSOR) MISC  Continuous Glucose Transmitter (DEXCOM G6 TRANSMITTER) MISC  hydrocortisone (CORTAID) 1 % external cream  senna-docusate (SENOKOT-S/PERICOLACE) 8.6-50 MG tablet      Allergies  "  Allergen Reactions    Fentanyl Blisters     Per pt, after taking this medication had blisters develope    Tylenol [Acetaminophen] Itching    Dulaglutide Rash    Insulin Lispro Rash     Patient reported    Penicillin V Other (See Comments) and Rash     Diffuse maculopapular rash + feels \"high\", per pt.      Family History  Family History   Problem Relation Age of Onset    Diabetes Brother     Diabetes Father     Alzheimer Disease Father     Unknown/Adopted Mother     Diabetes Paternal Grandfather     Cancer No family hx of         no skin cancer    Skin Cancer No family hx of         no famiy hx of skin cancer    Glaucoma No family hx of     Macular Degeneration No family hx of      Social History   Social History     Tobacco Use    Smoking status: Some Days     Current packs/day: 0.25     Average packs/day: 0.3 packs/day for 40.0 years (10.0 ttl pk-yrs)     Types: Cigarettes    Smokeless tobacco: Former   Substance Use Topics    Alcohol use: No     Alcohol/week: 0.0 standard drinks of alcohol     Comment: Last etoh in 2007    Drug use: Not Currently     Types: Marijuana, Methamphetamines      A complete review of systems was performed with pertinent positives and negatives noted in the HPI, and all other systems negative.    Physical Exam   BP: (!) 151/78  Pulse: 100  Temp: 98.3  F (36.8  C)  Resp: 16  Height: 162.6 cm (5' 4\")  Weight: 71.1 kg (156 lb 12.8 oz)  SpO2: 99 %  Physical Exam  Constitutional:       General: He is not in acute distress.     Appearance: Normal appearance. He is not toxic-appearing.   HENT:      Head: Atraumatic.   Eyes:      General: No scleral icterus.     Conjunctiva/sclera: Conjunctivae normal.   Cardiovascular:      Rate and Rhythm: Normal rate.      Heart sounds: Normal heart sounds.   Pulmonary:      Effort: Pulmonary effort is normal. No respiratory distress.      Breath sounds: Normal breath sounds.   Abdominal:      Palpations: Abdomen is soft.      Tenderness: There is no " abdominal tenderness.   Musculoskeletal:         General: No deformity.      Cervical back: Neck supple.   Skin:     General: Skin is warm.   Neurological:      Mental Status: He is alert.           ED Course, Procedures, & Data      Procedures                Results for orders placed or performed during the hospital encounter of 07/26/24   Glucose by meter     Status: Abnormal   Result Value Ref Range    GLUCOSE BY METER POCT 372 (H) 70 - 99 mg/dL   Basic metabolic panel     Status: Abnormal   Result Value Ref Range    Sodium 137 135 - 145 mmol/L    Potassium 3.4 3.4 - 5.3 mmol/L    Chloride 103 98 - 107 mmol/L    Carbon Dioxide (CO2) 20 (L) 22 - 29 mmol/L    Anion Gap 14 7 - 15 mmol/L    Urea Nitrogen 12.6 8.0 - 23.0 mg/dL    Creatinine 0.76 0.67 - 1.17 mg/dL    GFR Estimate >90 >60 mL/min/1.73m2    Calcium 8.8 8.8 - 10.4 mg/dL    Glucose 403 (H) 70 - 99 mg/dL   Blood gas venous     Status: None   Result Value Ref Range    pH Venous 7.38 7.32 - 7.43    pCO2 Venous 41 40 - 50 mm Hg    pO2 Venous 40 25 - 47 mm Hg    Bicarbonate Venous 24 21 - 28 mmol/L    Base Excess/Deficit Venous -1.1 -3.0 - 3.0 mmol/L    FIO2 0     Oxyhemoglobin Venous 72 70 - 75 %    O2 Sat, Venous 73.9 70.0 - 75.0 %    Narrative    In healthy individuals, oxyhemoglobin (O2Hb) and oxygen saturation (SO2) are approximately equal. In the presence of dyshemoglobins, oxyhemoglobin can be considerably lower than oxygen saturation.   UA with Microscopic reflex to Culture     Status: Abnormal    Specimen: Urine, Midstream   Result Value Ref Range    Color Urine Straw Colorless, Straw, Light Yellow, Yellow    Appearance Urine Clear Clear    Glucose Urine >=1000 (A) Negative mg/dL    Bilirubin Urine Negative Negative    Ketones Urine Negative Negative mg/dL    Specific Gravity Urine 1.031 1.003 - 1.035    Blood Urine Negative Negative    pH Urine 6.0 5.0 - 7.0    Protein Albumin Urine Negative Negative mg/dL    Urobilinogen Urine Normal Normal, 2.0 mg/dL     Nitrite Urine Negative Negative    Leukocyte Esterase Urine Negative Negative    RBC Urine 0 <=2 /HPF    WBC Urine <1 <=5 /HPF    Narrative    Urine Culture not indicated   CBC with platelets and differential     Status: Abnormal   Result Value Ref Range    WBC Count 6.7 4.0 - 11.0 10e3/uL    RBC Count 3.82 (L) 4.40 - 5.90 10e6/uL    Hemoglobin 11.8 (L) 13.3 - 17.7 g/dL    Hematocrit 33.2 (L) 40.0 - 53.0 %    MCV 87 78 - 100 fL    MCH 30.9 26.5 - 33.0 pg    MCHC 35.5 31.5 - 36.5 g/dL    RDW 12.9 10.0 - 15.0 %    Platelet Count 427 150 - 450 10e3/uL    % Neutrophils 57 %    % Lymphocytes 33 %    % Monocytes 6 %    % Eosinophils 2 %    % Basophils 1 %    % Immature Granulocytes 1 %    NRBCs per 100 WBC 0 <1 /100    Absolute Neutrophils 3.8 1.6 - 8.3 10e3/uL    Absolute Lymphocytes 2.2 0.8 - 5.3 10e3/uL    Absolute Monocytes 0.4 0.0 - 1.3 10e3/uL    Absolute Eosinophils 0.1 0.0 - 0.7 10e3/uL    Absolute Basophils 0.0 0.0 - 0.2 10e3/uL    Absolute Immature Granulocytes 0.1 <=0.4 10e3/uL    Absolute NRBCs 0.0 10e3/uL   CBC with platelets differential     Status: Abnormal    Narrative    The following orders were created for panel order CBC with platelets differential.  Procedure                               Abnormality         Status                     ---------                               -----------         ------                     CBC with platelets and d...[[id number]]  Abnormal            Final result                 Please view results for these tests on the individual orders.     Medications   sodium chloride 0.9% BOLUS 1,000 mL (1,000 mLs Intravenous $New Bag 7/26/24 2010)     Labs Ordered and Resulted from Time of ED Arrival to Time of ED Departure   GLUCOSE BY METER - Abnormal       Result Value    GLUCOSE BY METER POCT 372 (*)    BASIC METABOLIC PANEL - Abnormal    Sodium 137      Potassium 3.4      Chloride 103      Carbon Dioxide (CO2) 20 (*)     Anion Gap 14      Urea Nitrogen 12.6      Creatinine  0.76      GFR Estimate >90      Calcium 8.8      Glucose 403 (*)    ROUTINE UA WITH MICROSCOPIC REFLEX TO CULTURE - Abnormal    Color Urine Straw      Appearance Urine Clear      Glucose Urine >=1000 (*)     Bilirubin Urine Negative      Ketones Urine Negative      Specific Gravity Urine 1.031      Blood Urine Negative      pH Urine 6.0      Protein Albumin Urine Negative      Urobilinogen Urine Normal      Nitrite Urine Negative      Leukocyte Esterase Urine Negative      RBC Urine 0      WBC Urine <1     CBC WITH PLATELETS AND DIFFERENTIAL - Abnormal    WBC Count 6.7      RBC Count 3.82 (*)     Hemoglobin 11.8 (*)     Hematocrit 33.2 (*)     MCV 87      MCH 30.9      MCHC 35.5      RDW 12.9      Platelet Count 427      % Neutrophils 57      % Lymphocytes 33      % Monocytes 6      % Eosinophils 2      % Basophils 1      % Immature Granulocytes 1      NRBCs per 100 WBC 0      Absolute Neutrophils 3.8      Absolute Lymphocytes 2.2      Absolute Monocytes 0.4      Absolute Eosinophils 0.1      Absolute Basophils 0.0      Absolute Immature Granulocytes 0.1      Absolute NRBCs 0.0     BLOOD GAS VENOUS    pH Venous 7.38      pCO2 Venous 41      pO2 Venous 40      Bicarbonate Venous 24      Base Excess/Deficit Venous -1.1      FIO2 0      Oxyhemoglobin Venous 72      O2 Sat, Venous 73.9       No orders to display          Critical care was not performed.     Medical Decision Making  The patient's presentation was of moderate complexity (an undiagnosed new problem with uncertain prognosis).    The patient's evaluation involved:  review of external note(s) from 3+ sources (see separate area of note for details)  review of 3+ test result(s) ordered prior to this encounter (see separate area of note for details)  ordering and/or review of 3+ test(s) in this encounter (see separate area of note for details)    The patient's management necessitated moderate risk (concern for possible DKA, evaluation for severe hyperglycemia,  ordered IV fluids, discussed discharge planning with patient).    Assessment & Plan    This is a 58-year-old male with history of type 2 diabetes presenting after not taking his insulin for the past 2 days. Patient states that he has not taken his insulin since Wednesday (7/24/2024) evening.  Patient's point-of-care blood glucose in triage measured at 372.  Patient presentation suspicious for hypoglycemia versus less likely DKA.  Will establish IV access and draw blood for laboratory analysis.  We will order a prescription to the [facility] for him to  tomorrow morning.  Labs as reviewed and interpreted by me significant for hyperglycemia without any evidence of DKA, but patient with glucosuria and hyperglycemia  I offered insulin for patient tonight, but he said he was not hungry and did not eat much, so would rather defer insulin tonight until he starts his home regimen tomorrow  Discussed potential labs inpatient the emergency room as patient is unhoused, but he reports that he feels fine to go home, and is okay with discharging home and that he will  all of his medications tomorrow at the pharmacy  Therefore patient discharged home with strict return precautions to come back to the emergency room for any new or worsening symptoms, and instructed to follow-up with his primary care doctor in the next 1 to 2 days.    I have reviewed the nursing notes. I have reviewed the findings, diagnosis, plan and need for follow up with the patient.    Current Discharge Medication List          Final diagnoses:   Hyperglycemia       Patel Diop, am serving as a trained medical scribe to document services personally performed by Jacques Mann MD based on the provider's statements to me on July 26, 2024.  This document has been checked and approved by the attending provider.    I, Jacques Mann MD, was physically present and have reviewed and verified the accuracy of this note  documented by Patel Alexandre, medical scribe.      Jacques Mann MD   [facility] EMERGENCY DEPARTMENT  7/26/2024     Jacques Mann MD  07/26/24 2125

## 2024-07-29 ENCOUNTER — HOSPITAL ENCOUNTER (EMERGENCY)
Facility: CLINIC | Age: 59
Discharge: HOME | End: 2024-07-29
Attending: EMERGENCY MEDICINE | Admitting: EMERGENCY MEDICINE
Payer: COMMERCIAL

## 2024-07-29 VITALS
TEMPERATURE: 97.9 F | RESPIRATION RATE: 18 BRPM | OXYGEN SATURATION: 99 % | SYSTOLIC BLOOD PRESSURE: 175 MMHG | DIASTOLIC BLOOD PRESSURE: 89 MMHG | HEART RATE: 96 BPM

## 2024-07-29 DIAGNOSIS — K21.9 GERD (GASTROESOPHAGEAL REFLUX DISEASE): ICD-10-CM

## 2024-07-29 DIAGNOSIS — E11.65 UNCONTROLLED TYPE 2 DIABETES MELLITUS WITH HYPERGLYCEMIA (H): ICD-10-CM

## 2024-07-29 LAB
ALBUMIN SERPL BCG-MCNC: 4.1 G/DL (ref 3.5–5.2)
ALP SERPL-CCNC: 134 U/L (ref 40–150)
ALT SERPL W P-5'-P-CCNC: 16 U/L (ref 0–70)
ANION GAP SERPL CALCULATED.3IONS-SCNC: 13 MMOL/L (ref 7–15)
AST SERPL W P-5'-P-CCNC: 13 U/L (ref 0–45)
B-OH-BUTYR SERPL-SCNC: <0.18 MMOL/L
BASOPHILS # BLD AUTO: 0.1 10E3/UL (ref 0–0.2)
BASOPHILS NFR BLD AUTO: 1 %
BILIRUB SERPL-MCNC: 0.4 MG/DL
BUN SERPL-MCNC: 10.3 MG/DL (ref 8–23)
CALCIUM SERPL-MCNC: 9.1 MG/DL (ref 8.8–10.4)
CHLORIDE SERPL-SCNC: 97 MMOL/L (ref 98–107)
CREAT SERPL-MCNC: 0.73 MG/DL (ref 0.67–1.17)
EGFRCR SERPLBLD CKD-EPI 2021: >90 ML/MIN/1.73M2
EOSINOPHIL # BLD AUTO: 0.2 10E3/UL (ref 0–0.7)
EOSINOPHIL NFR BLD AUTO: 2 %
ERYTHROCYTE [DISTWIDTH] IN BLOOD BY AUTOMATED COUNT: 13.3 % (ref 10–15)
GLUCOSE BLDC GLUCOMTR-MCNC: 453 MG/DL (ref 70–99)
GLUCOSE BLDC GLUCOMTR-MCNC: 496 MG/DL (ref 70–99)
GLUCOSE SERPL-MCNC: 567 MG/DL (ref 70–99)
HCO3 SERPL-SCNC: 24 MMOL/L (ref 22–29)
HCT VFR BLD AUTO: 36.5 % (ref 40–53)
HGB BLD-MCNC: 12.5 G/DL (ref 13.3–17.7)
IMM GRANULOCYTES # BLD: 0.2 10E3/UL
IMM GRANULOCYTES NFR BLD: 2 %
LYMPHOCYTES # BLD AUTO: 2.5 10E3/UL (ref 0.8–5.3)
LYMPHOCYTES NFR BLD AUTO: 33 %
MAGNESIUM SERPL-MCNC: 1.9 MG/DL (ref 1.7–2.3)
MCH RBC QN AUTO: 30.2 PG (ref 26.5–33)
MCHC RBC AUTO-ENTMCNC: 34.2 G/DL (ref 31.5–36.5)
MCV RBC AUTO: 88 FL (ref 78–100)
MONOCYTES # BLD AUTO: 0.4 10E3/UL (ref 0–1.3)
MONOCYTES NFR BLD AUTO: 6 %
NEUTROPHILS # BLD AUTO: 4.3 10E3/UL (ref 1.6–8.3)
NEUTROPHILS NFR BLD AUTO: 56 %
NRBC # BLD AUTO: 0 10E3/UL
NRBC BLD AUTO-RTO: 0 /100
PLATELET # BLD AUTO: 459 10E3/UL (ref 150–450)
POTASSIUM SERPL-SCNC: 3.4 MMOL/L (ref 3.4–5.3)
PROT SERPL-MCNC: 7.3 G/DL (ref 6.4–8.3)
RBC # BLD AUTO: 4.14 10E6/UL (ref 4.4–5.9)
SODIUM SERPL-SCNC: 134 MMOL/L (ref 135–145)
WBC # BLD AUTO: 7.6 10E3/UL (ref 4–11)

## 2024-07-29 PROCEDURE — 82010 KETONE BODYS QUAN: CPT

## 2024-07-29 PROCEDURE — 250N000013 HC RX MED GY IP 250 OP 250 PS 637

## 2024-07-29 PROCEDURE — 99284 EMERGENCY DEPT VISIT MOD MDM: CPT | Mod: 25 | Performed by: EMERGENCY MEDICINE

## 2024-07-29 PROCEDURE — 250N000012 HC RX MED GY IP 250 OP 636 PS 637

## 2024-07-29 PROCEDURE — 82962 GLUCOSE BLOOD TEST: CPT

## 2024-07-29 PROCEDURE — 99284 EMERGENCY DEPT VISIT MOD MDM: CPT | Mod: FS | Performed by: EMERGENCY MEDICINE

## 2024-07-29 PROCEDURE — 96361 HYDRATE IV INFUSION ADD-ON: CPT | Performed by: EMERGENCY MEDICINE

## 2024-07-29 PROCEDURE — 85025 COMPLETE CBC W/AUTO DIFF WBC: CPT

## 2024-07-29 PROCEDURE — 36415 COLL VENOUS BLD VENIPUNCTURE: CPT

## 2024-07-29 PROCEDURE — 96374 THER/PROPH/DIAG INJ IV PUSH: CPT | Performed by: EMERGENCY MEDICINE

## 2024-07-29 PROCEDURE — 80053 COMPREHEN METABOLIC PANEL: CPT

## 2024-07-29 PROCEDURE — 83735 ASSAY OF MAGNESIUM: CPT

## 2024-07-29 PROCEDURE — 250N000011 HC RX IP 250 OP 636

## 2024-07-29 PROCEDURE — 250N000009 HC RX 250

## 2024-07-29 PROCEDURE — 258N000003 HC RX IP 258 OP 636

## 2024-07-29 RX ORDER — MAGNESIUM HYDROXIDE/ALUMINUM HYDROXICE/SIMETHICONE 120; 1200; 1200 MG/30ML; MG/30ML; MG/30ML
15 SUSPENSION ORAL ONCE
Status: COMPLETED | OUTPATIENT
Start: 2024-07-29 | End: 2024-07-29

## 2024-07-29 RX ORDER — LIDOCAINE HYDROCHLORIDE 20 MG/ML
10 SOLUTION OROPHARYNGEAL ONCE
Status: COMPLETED | OUTPATIENT
Start: 2024-07-29 | End: 2024-07-29

## 2024-07-29 RX ORDER — METOCLOPRAMIDE HYDROCHLORIDE 5 MG/ML
5 INJECTION INTRAMUSCULAR; INTRAVENOUS ONCE
Status: COMPLETED | OUTPATIENT
Start: 2024-07-29 | End: 2024-07-29

## 2024-07-29 RX ADMIN — INSULIN ASPART 7 UNITS: 100 INJECTION, SOLUTION INTRAVENOUS; SUBCUTANEOUS at 21:13

## 2024-07-29 RX ADMIN — SODIUM CHLORIDE 1000 ML: 9 INJECTION, SOLUTION INTRAVENOUS at 18:10

## 2024-07-29 RX ADMIN — ALUMINUM HYDROXIDE, MAGNESIUM HYDROXIDE, AND SIMETHICONE 15 ML: 1200; 120; 1200 SUSPENSION ORAL at 15:42

## 2024-07-29 RX ADMIN — SODIUM CHLORIDE 1000 ML: 9 INJECTION, SOLUTION INTRAVENOUS at 15:58

## 2024-07-29 RX ADMIN — LIDOCAINE HYDROCHLORIDE 10 ML: 20 SOLUTION OROPHARYNGEAL at 15:42

## 2024-07-29 RX ADMIN — METOCLOPRAMIDE 5 MG: 5 INJECTION, SOLUTION INTRAMUSCULAR; INTRAVENOUS at 18:10

## 2024-07-29 ASSESSMENT — ACTIVITIES OF DAILY LIVING (ADL)
ADLS_ACUITY_SCORE: 37
ADLS_ACUITY_SCORE: 35
ADLS_ACUITY_SCORE: 37
ADLS_ACUITY_SCORE: 37

## 2024-07-29 NOTE — ED TRIAGE NOTES
Pt says he has been feeling dizzy and nauseous since yesterday with blurry vision. Pt says he was in the ER recently for hyperglycemia because he ran out of insulin. Pt says he feels weak. Pt is alert, oriented ambulatory.

## 2024-07-29 NOTE — ED PROVIDER NOTES
ED Provider Note  [facility]      History     Chief Complaint   Patient presents with    Nausea     The history is provided by the patient and medical records. No  was used.     Andrew Collier is a 58 year old male with a history of insulin dependent diabetes, substance abuse, PTSD, hyperlipidemia, CNS toxoplasmosis, HIV who presents to the emergency department with nausea.  Patient was recently seen at [facility] ED 7/26/2024 for hyperglycemia due to running out of his insulin/Lantus.  Per triage note, patient reports that he has been feeling lightheaded and nauseous since yesterday with blurry vision.  On initial evaluation, patient is sleeping in the triage be chair in no acute distress.  He states that he has been feeling lightheaded with heartburn symptoms since this morning.  He denies any symptoms of room spinning or himself spinning related to his lightheadedness.  He states his vision appears blurry but denies any loss of vision or double vision.  He does not work contacts or corrective lenses.  He initially reports to the triage nurse that he has been feeling nauseated but during initial interview, he denies nausea or vomiting symptoms.  He denies any abdominal pain similar to his previous presentations including earlier this month when he was diagnosed with a partial small bowel obstruction.  He denies any constipation or diarrhea.  He denies any dysuria, hematuria, urgency or frequency of urination.  He denies any chest pain or shortness of air symptoms besides heartburn that he states feels the same as he has felt in the past without any acute changes.  He states he has not done insulin today for his diabetes due to issues affording or being able to  the medication but it is unclear of the exact issue based on interview with the patient.    Past Medical History  Past Medical History:   Diagnosis Date    Acute appendicitis with  localized peritonitis 1/31/2018    AIDS (H)     Allergic rhinitis due to other allergen     DNS    Anal dysplasia     Chronic abdominal pain     CNS toxoplasmosis (H)     COVID-19 1/11/2022    Diabetes type 2, controlled (H)     GERD (gastroesophageal reflux disease)     Herpes zoster 9/23/2016    History of seizure     History of substance abuse (H)     HIV (human immunodeficiency virus infection) (H)     HLD (hyperlipidemia)     Lung nodules     Periungual wart     Pneumonia 1/6/2019    PTSD (post-traumatic stress disorder)     Sleep apnea     doesn't use CPAP     Past Surgical History:   Procedure Laterality Date    ANOSCOPY N/A 9/9/2020    Procedure: Exam Under Anesthesia, ANOSCOPY, fulgeration of rectal fissures with Rectal Biopsies;  Surgeon: Thanh Lundberg MD;  Location: [facility] OR    COLONOSCOPY Left 1/22/2016    Procedure: COMBINED COLONOSCOPY, SINGLE OR MULTIPLE BIOPSY/POLYPECTOMY BY BIOPSY;  Surgeon: Clark Saini MD;  Location: [facility] GI    ESOPHAGOSCOPY, GASTROSCOPY, DUODENOSCOPY (EGD), COMBINED N/A 6/6/2022    Procedure: ESOPHAGOGASTRODUODENOSCOPY, WITH BIOPSY;  Surgeon: Kecia Benítez MD;  Location: [facility] GI    HC EXPLORE UNDESC TESTIS,INGUIN/SCROTAL      LAPAROSCOPIC APPENDECTOMY N/A 1/31/2018    Procedure: LAPAROSCOPIC APPENDECTOMY;  LAPAROSCOPIC APPENDECTOMY;  Surgeon: Dawn Holt MD;  Location: [facility] OR    LAPAROSCOPY DIAGNOSTIC (GENERAL) N/A 7/26/2016    Procedure: LAPAROSCOPY DIAGNOSTIC (GENERAL);  Surgeon: Susannah Arriaga MD;  Location: [facility] OR    LAPAROSCOPY DIAGNOSTIC (GENERAL) N/A 4/16/2018    Procedure: LAPAROSCOPY DIAGNOSTIC (GENERAL);  Diagnostic laparoscopy and lysis of adhesions;  Surgeon: Prince Dowling MD;  Location: [facility] OR    OPTICAL TRACKING SYSTEM CRANIOTOMY, EXCISE TUMOR, COMBINED Left 4/10/2015    Procedure: COMBINED OPTICAL TRACKING SYSTEM CRANIOTOMY, EXCISE TUMOR;  Surgeon: Mirlande Colmenares MD;  Location: [facility] OR    REPAIR GAMEKEEPER'S THUMB Right  "12/2/2016    Procedure: REPAIR LIGAMENT ULNAR COLLATERAL THUMB (GAMEKEEPER'S);  Surgeon: Evin Zamorano MD;  Location:  OR    [facility] NONSPECIFIC PROCEDURE      right forearm fracture     bictegravir-emtricitabine-tenofovir (BIKTARVY) -25 MG per tablet  blood glucose (NO BRAND SPECIFIED) lancets standard  blood glucose (NO BRAND SPECIFIED) test strip  blood glucose monitoring (NO BRAND SPECIFIED) meter device kit  cephALEXin (KEFLEX) 500 MG capsule  Continuous Glucose Sensor (DEXCOM G6 SENSOR) MISC  Continuous Glucose Transmitter (DEXCOM G6 TRANSMITTER) MISC  famotidine (PEPCID) 40 MG tablet  glipiZIDE (GLUCOTROL XL) 10 MG 24 hr tablet  insulin glargine (LANTUS PEN) 100 UNIT/ML pen  insulin pen needle (32G X 4 MM) 32G X 4 MM miscellaneous  Nutritional Supplements (ENSURE ACTIVE) LIQD  polyethylene glycol (MIRALAX) 17 GM/Dose powder  senna-docusate (SENOKOT-S/PERICOLACE) 8.6-50 MG tablet      Allergies   Allergen Reactions    Fentanyl Blisters     Per pt, after taking this medication had blisters develope    Tylenol [Acetaminophen] Itching    Dulaglutide Rash    Insulin Lispro Rash     Patient reported    Penicillin V Other (See Comments) and Rash     Diffuse maculopapular rash + feels \"high\", per pt.      Family History  Family History   Problem Relation Age of Onset    Diabetes Brother     Diabetes Father     Alzheimer Disease Father     Unknown/Adopted Mother     Diabetes Paternal Grandfather     Cancer No family hx of         no skin cancer    Skin Cancer No family hx of         no famiy hx of skin cancer    Glaucoma No family hx of     Macular Degeneration No family hx of      Social History   Social History     Tobacco Use    Smoking status: Some Days     Current packs/day: 0.25     Average packs/day: 0.3 packs/day for 40.0 years (10.0 ttl pk-yrs)     Types: Cigarettes    Smokeless tobacco: Former   Substance Use Topics    Alcohol use: No     Alcohol/week: 0.0 standard drinks of alcohol     Comment: " Last etoh in 2007    Drug use: Not Currently     Types: Marijuana, Methamphetamines      Past medical history, past surgical history, medications, allergies, family history, and social history were reviewed with the patient. No additional pertinent items.     A medically appropriate review of systems was performed with pertinent positives and negatives noted in the HPI, and all other systems negative.    Physical Exam   BP: 138/76  Pulse: 97  Temp: 97.8  F (36.6  C)  Resp: 20  SpO2: 98 %  Physical Exam  Constitutional:       General: He is not in acute distress.     Appearance: Normal appearance. He is normal weight. He is not ill-appearing, toxic-appearing or diaphoretic.   HENT:      Mouth/Throat:      Mouth: Mucous membranes are moist.      Pharynx: Oropharynx is clear.   Eyes:      Extraocular Movements: Extraocular movements intact.      Pupils: Pupils are equal, round, and reactive to light.   Cardiovascular:      Rate and Rhythm: Normal rate and regular rhythm.   Pulmonary:      Effort: Pulmonary effort is normal. No respiratory distress.      Breath sounds: Normal breath sounds.   Abdominal:      General: Abdomen is flat. Bowel sounds are normal. There is no distension.      Palpations: Abdomen is soft.      Tenderness: There is no abdominal tenderness. There is no guarding or rebound.   Musculoskeletal:      Cervical back: Normal range of motion.   Skin:     General: Skin is warm.      Capillary Refill: Capillary refill takes less than 2 seconds.   Neurological:      General: No focal deficit present.      Mental Status: He is alert and oriented to person, place, and time. Mental status is at baseline.      Cranial Nerves: No cranial nerve deficit.      Sensory: No sensory deficit.      Motor: No weakness.   Psychiatric:         Mood and Affect: Mood normal.         Behavior: Behavior normal.       ED Course, Procedures, & Data     ED Course as of 07/30/24 0047   Mon Jul 29, 2024   1643 Glucose(!!): 567    1748 GLUCOSE BY METER POCT(!): 496     Procedures              Results for orders placed or performed during the hospital encounter of 07/29/24   Comprehensive metabolic panel     Status: Abnormal   Result Value Ref Range    Sodium 134 (L) 135 - 145 mmol/L    Potassium 3.4 3.4 - 5.3 mmol/L    Carbon Dioxide (CO2) 24 22 - 29 mmol/L    Anion Gap 13 7 - 15 mmol/L    Urea Nitrogen 10.3 8.0 - 23.0 mg/dL    Creatinine 0.73 0.67 - 1.17 mg/dL    GFR Estimate >90 >60 mL/min/1.73m2    Calcium 9.1 8.8 - 10.4 mg/dL    Chloride 97 (L) 98 - 107 mmol/L    Glucose 567 (HH) 70 - 99 mg/dL    Alkaline Phosphatase 134 40 - 150 U/L    AST 13 0 - 45 U/L    ALT 16 0 - 70 U/L    Protein Total 7.3 6.4 - 8.3 g/dL    Albumin 4.1 3.5 - 5.2 g/dL    Bilirubin Total 0.4 <=1.2 mg/dL   Magnesium     Status: Normal   Result Value Ref Range    Magnesium 1.9 1.7 - 2.3 mg/dL   [place] Draw *Canceled*     Status: None ()    Narrative    The following orders were created for panel order [place] Draw.  Procedure                               Abnormality         Status                     ---------                               -----------         ------                       Please view results for these tests on the individual orders.   CBC with platelets and differential     Status: Abnormal   Result Value Ref Range    WBC Count 7.6 4.0 - 11.0 10e3/uL    RBC Count 4.14 (L) 4.40 - 5.90 10e6/uL    Hemoglobin 12.5 (L) 13.3 - 17.7 g/dL    Hematocrit 36.5 (L) 40.0 - 53.0 %    MCV 88 78 - 100 fL    MCH 30.2 26.5 - 33.0 pg    MCHC 34.2 31.5 - 36.5 g/dL    RDW 13.3 10.0 - 15.0 %    Platelet Count 459 (H) 150 - 450 10e3/uL    % Neutrophils 56 %    % Lymphocytes 33 %    % Monocytes 6 %    % Eosinophils 2 %    % Basophils 1 %    % Immature Granulocytes 2 %    NRBCs per 100 WBC 0 <1 /100    Absolute Neutrophils 4.3 1.6 - 8.3 10e3/uL    Absolute Lymphocytes 2.5 0.8 - 5.3 10e3/uL    Absolute Monocytes 0.4 0.0 - 1.3 10e3/uL    Absolute Eosinophils 0.2 0.0 - 0.7  10e3/uL    Absolute Basophils 0.1 0.0 - 0.2 10e3/uL    Absolute Immature Granulocytes 0.2 <=0.4 10e3/uL    Absolute NRBCs 0.0 10e3/uL   Ketone Beta-Hydroxybutyrate Quantitative,Rapid     Status: Normal   Result Value Ref Range    Ketone (Beta-Hydroxybutyrate) Quantitative <0.18 <=0.30 mmol/L   Glucose by meter     Status: Abnormal   Result Value Ref Range    GLUCOSE BY METER POCT 496 (H) 70 - 99 mg/dL   Glucose by meter     Status: Abnormal   Result Value Ref Range    GLUCOSE BY METER POCT 453 (H) 70 - 99 mg/dL   CBC with platelets differential     Status: Abnormal    Narrative    The following orders were created for panel order CBC with platelets differential.  Procedure                               Abnormality         Status                     ---------                               -----------         ------                     CBC with platelets and d...[[id number]]  Abnormal            Final result                 Please view results for these tests on the individual orders.     Medications   alum & mag hydroxide-simethicone (MAALOX) suspension 15 mL (15 mLs Oral $Given 7/29/24 1542)   lidocaine (viscous) (XYLOCAINE) 2 % solution 10 mL (10 mLs Mouth/Throat $Given 7/29/24 1542)   sodium chloride 0.9% BOLUS 1,000 mL (0 mLs Intravenous Stopped 7/29/24 1650)   sodium chloride 0.9% BOLUS 1,000 mL (0 mLs Intravenous Stopped 7/29/24 2113)   metoclopramide (REGLAN) injection 5 mg (5 mg Intravenous $Given 7/29/24 1810)   insulin aspart (NovoLOG) injection (RAPID ACTING) (7 Units Subcutaneous $Given 7/29/24 2113)     Labs Ordered and Resulted from Time of ED Arrival to Time of ED Departure   COMPREHENSIVE METABOLIC PANEL - Abnormal       Result Value    Sodium 134 (*)     Potassium 3.4      Carbon Dioxide (CO2) 24      Anion Gap 13      Urea Nitrogen 10.3      Creatinine 0.73      GFR Estimate >90      Calcium 9.1      Chloride 97 (*)     Glucose 567 (*)     Alkaline Phosphatase 134      AST 13      ALT 16      Protein  Total 7.3      Albumin 4.1      Bilirubin Total 0.4     CBC WITH PLATELETS AND DIFFERENTIAL - Abnormal    WBC Count 7.6      RBC Count 4.14 (*)     Hemoglobin 12.5 (*)     Hematocrit 36.5 (*)     MCV 88      MCH 30.2      MCHC 34.2      RDW 13.3      Platelet Count 459 (*)     % Neutrophils 56      % Lymphocytes 33      % Monocytes 6      % Eosinophils 2      % Basophils 1      % Immature Granulocytes 2      NRBCs per 100 WBC 0      Absolute Neutrophils 4.3      Absolute Lymphocytes 2.5      Absolute Monocytes 0.4      Absolute Eosinophils 0.2      Absolute Basophils 0.1      Absolute Immature Granulocytes 0.2      Absolute NRBCs 0.0     GLUCOSE BY METER - Abnormal    GLUCOSE BY METER POCT 496 (*)    GLUCOSE BY METER - Abnormal    GLUCOSE BY METER POCT 453 (*)    MAGNESIUM - Normal    Magnesium 1.9     KETONE BETA-HYDROXYBUTYRATE QUANTITATIVE, RAPID - Normal    Ketone (Beta-Hydroxybutyrate) Quantitative <0.18       No orders to display          Critical care was not performed.     Medical Decision Making  The patient's presentation was of high complexity (an acute health issue posing potential threat to life or bodily function).    The patient's evaluation involved:  review of external note(s) from 1 sources (previous ED encounters for similar complaints)  review of 1 test result(s) ordered prior to this encounter (previous blood glucose levels)  ordering and/or review of 3+ test(s) in this encounter (see separate area of note for details)    The patient's management necessitated moderate risk (prescription drug management including medications given in the ED).    Assessment & Plan    Andrew is a 58-year-old male that presented to the ED with complaints of acid reflux symptoms as well as lightheadedness and generalized weakness.  Blood pressure 175/89 but otherwise vital signs stable without tachycardia, fever, hypoxia on room air.  Patient in no acute distress nontoxic-appearing throughout the ED visit.  No  neurodeficits on exam.  No nystagmus and otherwise equal EOMs on exam.  No upper or lower extremity weakness or paresthesias elicited on exam.  No acute abdomen signs or tenderness on palpation.  No adventitious lung sounds on auscultation.  Sodium 134.  Serum glucose on .  CBC magnesium within normal limits.  Beta hydroxybutyrate less than 0.18.  No anion gap.  Suspect GERD as well as hyperglycemia and type 2 diabetes without DKA.  IV NS bolus 1000 mL x 2, p.o. GI cocktail, IV Reglan in the ED.  Blood glucose 4 2:53 liters of fluids.  Insulin aspart 7 units administered.  Discussed reassuring labs with the patient at length as well as the recommendation for discharge home and close follow-up recommendation with his PCP office for management of his uncontrolled diabetes.  Strict return precautions given and discussed at length.  Recommend resuming long-acting insulin as prescribed this evening.  Continue plenty of fluids and bland diet as tolerated to not exacerbate his acid reflux symptoms.  Referral was sent for PCP office contact to closely follow-up with him in the next week due to his frequent ED visits and notable hyperglycemia in the setting of his type 2 diabetes.  Patient was agreeable to the discharge treatment plan, voiced understanding, and all questions answered prior to discharge.    --    ED Attending Physician Attestation    I TROY JAY MD, cared for this patient with the Advanced Practice Provider ([facility]). I personally provided a substantive portion of the care for this patient, including approving the care plan for the number and complexity of problems addressed and taking responsibility related to the risk of complications and/or morbidity or mortality of patient management. Please see the [facility]'s documentation for full details.    Summary of HPI, PE, ED Course   Patient is a 58 year old male evaluated in the emergency department for nausea and hyperglycemia. Exam and ED course notable  for hyperglycemia treated with IV fluids and insulin. After the completion of care in the emergency department, the patient was discharged.      TROY NIXON MD  Emergency Medicine       I have reviewed the nursing notes. I have reviewed the findings, diagnosis, plan and need for follow up with the patient.    Discharge Medication List as of 7/29/2024  9:20 PM          Final diagnoses:   GERD (gastroesophageal reflux disease)   Uncontrolled type 2 diabetes mellitus with hyperglycemia (H)       Lucy Sarabia PA-C    [facility] EMERGENCY DEPARTMENT  7/29/2024     Lucy Sarabia PA-C  07/30/24 0020       Troy Nixon MD  07/30/24 0048

## 2024-07-30 LAB — GLUCOSE BLDC GLUCOMTR-MCNC: 486 MG/DL (ref 70–99)

## 2024-07-30 NOTE — DISCHARGE INSTRUCTIONS
Contact and follow-up with your PCP office this week for reevaluation and recheck of your symptoms from your visit today in the ED as well as discussion of how to better control your type 2 diabetes due to your frequent hypoglycemia episodes  Continue plenty of fluids and bland diet as tolerated as to not worsen your acid reflux symptoms  Continue your long-acting insulin as prescribed  Otherwise, do not hesitate to seek urgent or emergent evaluation if you have any worsening or concerning signs or symptoms

## 2024-08-01 ENCOUNTER — HOSPITAL ENCOUNTER (EMERGENCY)
Facility: CLINIC | Age: 59
Discharge: HOME | End: 2024-08-01
Attending: EMERGENCY MEDICINE | Admitting: EMERGENCY MEDICINE
Payer: COMMERCIAL

## 2024-08-01 VITALS
HEIGHT: 64 IN | HEART RATE: 101 BPM | OXYGEN SATURATION: 100 % | SYSTOLIC BLOOD PRESSURE: 132 MMHG | RESPIRATION RATE: 20 BRPM | BODY MASS INDEX: 26.63 KG/M2 | DIASTOLIC BLOOD PRESSURE: 88 MMHG | TEMPERATURE: 98.2 F | WEIGHT: 156 LBS

## 2024-08-01 DIAGNOSIS — T14.8XXA BLISTER: ICD-10-CM

## 2024-08-01 PROCEDURE — 99283 EMERGENCY DEPT VISIT LOW MDM: CPT | Performed by: EMERGENCY MEDICINE

## 2024-08-01 PROCEDURE — 250N000013 HC RX MED GY IP 250 OP 250 PS 637: Performed by: EMERGENCY MEDICINE

## 2024-08-01 RX ORDER — IBUPROFEN 600 MG/1
600 TABLET, FILM COATED ORAL ONCE
Status: COMPLETED | OUTPATIENT
Start: 2024-08-01 | End: 2024-08-01

## 2024-08-01 RX ADMIN — IBUPROFEN 600 MG: 600 TABLET, FILM COATED ORAL at 01:51

## 2024-08-01 ASSESSMENT — COLUMBIA-SUICIDE SEVERITY RATING SCALE - C-SSRS
6. HAVE YOU EVER DONE ANYTHING, STARTED TO DO ANYTHING, OR PREPARED TO DO ANYTHING TO END YOUR LIFE?: NO
2. HAVE YOU ACTUALLY HAD ANY THOUGHTS OF KILLING YOURSELF IN THE PAST MONTH?: NO
1. IN THE PAST MONTH, HAVE YOU WISHED YOU WERE DEAD OR WISHED YOU COULD GO TO SLEEP AND NOT WAKE UP?: NO

## 2024-08-01 ASSESSMENT — ACTIVITIES OF DAILY LIVING (ADL): ADLS_ACUITY_SCORE: 35

## 2024-08-01 NOTE — ED PROVIDER NOTES
[place] EMERGENCY DEPARTMENT ([facility])    8/01/24     History     Chief Complaint   Patient presents with    Foot Pain     HPI  Andrew Collier is a 58 year old male who with PMH of insulin dependent diabetes, substance abuse, PTSD, hyperlipidemia, CNS toxoplasmosis, HIV who presents to the emergency department with foot pain.  He states that he developed a blister on his foot 2 days ago.  Initially fluid-filled and today it popped.  He is concerned that it may be getting infected.  He ambulates frequently as he is homeless.  He is wearing flip-flops.  He plans to follow-up with shelter resources to get adequate shoes tomorrow.  Denies rash or fevers.    Past Medical History  Past Medical History:   Diagnosis Date    Acute appendicitis with localized peritonitis 1/31/2018    AIDS (H)     Allergic rhinitis due to other allergen     DNS    Anal dysplasia     Chronic abdominal pain     CNS toxoplasmosis (H)     COVID-19 1/11/2022    Diabetes type 2, controlled (H)     GERD (gastroesophageal reflux disease)     Herpes zoster 9/23/2016    History of seizure     History of substance abuse (H)     HIV (human immunodeficiency virus infection) (H)     HLD (hyperlipidemia)     Lung nodules     Periungual wart     Pneumonia 1/6/2019    PTSD (post-traumatic stress disorder)     Sleep apnea     doesn't use CPAP     Past Surgical History:   Procedure Laterality Date    ANOSCOPY N/A 9/9/2020    Procedure: Exam Under Anesthesia, ANOSCOPY, fulgeration of rectal fissures with Rectal Biopsies;  Surgeon: Thanh Lundberg MD;  Location: [facility]    COLONOSCOPY Left 1/22/2016    Procedure: COMBINED COLONOSCOPY, SINGLE OR MULTIPLE BIOPSY/POLYPECTOMY BY BIOPSY;  Surgeon: Clark Saini MD;  Location:  GI    ESOPHAGOSCOPY, GASTROSCOPY, DUODENOSCOPY (EGD), COMBINED N/A 6/6/2022    Procedure: ESOPHAGOGASTRODUODENOSCOPY, WITH BIOPSY;  Surgeon: Kecia Benítez MD;  Location:  GI    HC EXPLORE UNDESC  TESTIS,INGUIN/SCROTAL      LAPAROSCOPIC APPENDECTOMY N/A 1/31/2018    Procedure: LAPAROSCOPIC APPENDECTOMY;  LAPAROSCOPIC APPENDECTOMY;  Surgeon: Dawn Holt MD;  [facility]    LAPAROSCOPY DIAGNOSTIC (GENERAL) N/A 7/26/2016    Procedure: LAPAROSCOPY DIAGNOSTIC (GENERAL);  Surgeon: Susannah Ariraga MD;  [facility]    LAPAROSCOPY DIAGNOSTIC (GENERAL) N/A 4/16/2018    Procedure: LAPAROSCOPY DIAGNOSTIC (GENERAL);  Diagnostic laparoscopy and lysis of adhesions;  Surgeon: Prince Dowling MD;  [facility]: [facility]    OPTICAL TRACKING SYSTEM CRANIOTOMY, EXCISE TUMOR, COMBINED Left 4/10/2015    Procedure: COMBINED OPTICAL TRACKING SYSTEM CRANIOTOMY, EXCISE TUMOR;  Surgeon: Mirlande Colmenares MD;  Location: [facility]    REPAIR GAMEKEEPER'S THUMB Right 12/2/2016    Procedure: REPAIR LIGAMENT ULNAR COLLATERAL THUMB (GAMEKEEPER'S);  Surgeon: Evin Zamorano MD;  [facility]    ZZC NONSPECIFIC PROCEDURE      right forearm fracture     bictegravir-emtricitabine-tenofovir (BIKTARVY) -25 MG per tablet  blood glucose (NO BRAND SPECIFIED) lancets standard  blood glucose (NO BRAND SPECIFIED) test strip  blood glucose monitoring (NO BRAND SPECIFIED) meter device kit  cephALEXin (KEFLEX) 500 MG capsule  Continuous Glucose Sensor (DEXCOM G6 SENSOR) MISC  Continuous Glucose Transmitter (DEXCOM G6 TRANSMITTER) MISC  famotidine (PEPCID) 40 MG tablet  glipiZIDE (GLUCOTROL XL) 10 MG 24 hr tablet  insulin glargine (LANTUS PEN) 100 UNIT/ML pen  insulin pen needle (32G X 4 MM) 32G X 4 MM miscellaneous  Nutritional Supplements (ENSURE ACTIVE) LIQD  polyethylene glycol (MIRALAX) 17 GM/Dose powder  senna-docusate (SENOKOT-S/PERICOLACE) 8.6-50 MG tablet      Allergies   Allergen Reactions    Fentanyl Blisters     Per pt, after taking this medication had blisters develope    Tylenol [Acetaminophen] Itching    Dulaglutide Rash    Insulin Lispro Rash     Patient reported    Penicillin V Other (See Comments)  "and Rash     Diffuse maculopapular rash + feels \"high\", per pt.      Family History  Family History   Problem Relation Age of Onset    Diabetes Brother     Diabetes Father     Alzheimer Disease Father     Unknown/Adopted Mother     Diabetes Paternal Grandfather     Cancer No family hx of         no skin cancer    Skin Cancer No family hx of         no famiy hx of skin cancer    Glaucoma No family hx of     Macular Degeneration No family hx of      Social History   Social History     Tobacco Use    Smoking status: Some Days     Current packs/day: 0.25     Average packs/day: 0.3 packs/day for 40.0 years (10.0 ttl pk-yrs)     Types: Cigarettes    Smokeless tobacco: Former   Substance Use Topics    Alcohol use: No     Alcohol/week: 0.0 standard drinks of alcohol     Comment: Last etoh in 2007    Drug use: Not Currently     Types: Marijuana, Methamphetamines      Past medical history, past surgical history, medications, allergies, family history, and social history were reviewed with the patient. No additional pertinent items.   A complete review of systems was performed with pertinent positives and negatives noted in the HPI, and all other systems negative.    Physical Exam   BP: 132/88  Pulse: 101  Temp: 98.2  F (36.8  C)  Resp: 20  Height: 162.6 cm (5' 4\")  Weight: 70.8 kg (156 lb)  SpO2: 100 %  Physical Exam  Vitals and nursing note reviewed.   Constitutional:       General: He is not in acute distress.     Appearance: Normal appearance.   HENT:      Head: Normocephalic.      Nose: Nose normal.   Eyes:      Pupils: Pupils are equal, round, and reactive to light.   Cardiovascular:      Rate and Rhythm: Normal rate and regular rhythm.   Pulmonary:      Effort: Pulmonary effort is normal.   Abdominal:      General: There is no distension.   Musculoskeletal:         General: No deformity. Normal range of motion.      Cervical back: Normal range of motion.        Feet:    Skin:     General: Skin is warm.   Neurological: "      Mental Status: He is alert and oriented to person, place, and time.   Psychiatric:         Mood and Affect: Mood normal.           ED Course, Procedures, & Data           No results found for any visits on 08/01/24.  Medications   ibuprofen (ADVIL/MOTRIN) tablet 600 mg (600 mg Oral $Given 8/1/24 0151)     Labs Ordered and Resulted from Time of ED Arrival to Time of ED Departure - No data to display  No orders to display          Critical care was not performed.     Medical Decision Making  The patient's presentation was of low complexity (an acute and uncomplicated illness or injury).    The patient's evaluation involved:  history and exam without other MDM data elements    The patient's management necessitated moderate risk (limitations due to social determinants of health (housing insecurity and substance use)).    Assessment & Plan    Patient has 2 blisters on the bottom of his foot.  Larger one has been unroofed.  No surrounding erythema.  No evidence of tinea pedis, trench foot, or cellulitis.  Wounds were cleansed with soap and water and Betadine.  Topical antibiotic ointment, dressing, and socks provided   Educated reasons to return to the ED    I have reviewed the nursing notes. I have reviewed the findings, diagnosis, plan and need for follow up with the patient.    Discharge Medication List as of 8/1/2024  1:48 AM          Final diagnoses:   Blister   GAGANDEEP KELLY, am serving as a trained medical scribe to document services personally performed by Kofi Mauro DO, based on the provider's statements to me.     Kofi KELLY DO, was physically present and have reviewed and verified the accuracy of this note documented by GAGANDEEP ERICKSON.     Kofi Mauro DO.   [facility] EMERGENCY DEPARTMENT  8/1/2024     Kofi Mauro DO  08/01/24 0359

## 2024-08-01 NOTE — ED TRIAGE NOTES
Patient presents with right foot pain due to a blister. Patient has been walking a lot and wearing flip flops.     Triage Assessment (Adult)       Row Name 08/01/24 0114          Triage Assessment    Airway WDL WDL        Respiratory WDL    Respiratory WDL WDL        Skin Circulation/Temperature WDL    Skin Circulation/Temperature WDL WDL        Cardiac WDL    Cardiac WDL WDL        Peripheral/Neurovascular WDL    Peripheral Neurovascular WDL WDL        Cognitive/Neuro/Behavioral WDL    Cognitive/Neuro/Behavioral WDL WDL        [place] Coma Scale    Best Eye Response 4-->(E4) spontaneous     Best Motor Response 6-->(M6) obeys commands     Best Verbal Response 5-->(V5) oriented     [place] Coma Scale Score 15

## 2024-08-05 ENCOUNTER — HOSPITAL ENCOUNTER (EMERGENCY)
Facility: CLINIC | Age: 59
Discharge: HOME | End: 2024-08-05
Attending: STUDENT IN AN ORGANIZED HEALTH CARE EDUCATION/TRAINING PROGRAM | Admitting: STUDENT IN AN ORGANIZED HEALTH CARE EDUCATION/TRAINING PROGRAM
Payer: COMMERCIAL

## 2024-08-05 ENCOUNTER — APPOINTMENT (OUTPATIENT)
Dept: GENERAL RADIOLOGY | Facility: CLINIC | Age: 59
End: 2024-08-05
Attending: STUDENT IN AN ORGANIZED HEALTH CARE EDUCATION/TRAINING PROGRAM
Payer: COMMERCIAL

## 2024-08-05 VITALS
BODY MASS INDEX: 25.61 KG/M2 | HEIGHT: 64 IN | WEIGHT: 150 LBS | DIASTOLIC BLOOD PRESSURE: 76 MMHG | HEART RATE: 96 BPM | TEMPERATURE: 97.6 F | SYSTOLIC BLOOD PRESSURE: 106 MMHG | RESPIRATION RATE: 18 BRPM | OXYGEN SATURATION: 99 %

## 2024-08-05 DIAGNOSIS — S90.821D: ICD-10-CM

## 2024-08-05 DIAGNOSIS — S70.12XA CONTUSION OF ANTERIOR THIGH, LEFT, INITIAL ENCOUNTER: ICD-10-CM

## 2024-08-05 LAB
ANION GAP SERPL CALCULATED.3IONS-SCNC: 10 MMOL/L (ref 7–15)
BASOPHILS # BLD AUTO: 0.1 10E3/UL (ref 0–0.2)
BASOPHILS NFR BLD AUTO: 1 %
BUN SERPL-MCNC: 10.7 MG/DL (ref 8–23)
CALCIUM SERPL-MCNC: 9.3 MG/DL (ref 8.8–10.4)
CHLORIDE SERPL-SCNC: 102 MMOL/L (ref 98–107)
CREAT SERPL-MCNC: 0.62 MG/DL (ref 0.67–1.17)
EGFRCR SERPLBLD CKD-EPI 2021: >90 ML/MIN/1.73M2
EOSINOPHIL # BLD AUTO: 0.2 10E3/UL (ref 0–0.7)
EOSINOPHIL NFR BLD AUTO: 2 %
ERYTHROCYTE [DISTWIDTH] IN BLOOD BY AUTOMATED COUNT: 13.4 % (ref 10–15)
GLUCOSE SERPL-MCNC: 283 MG/DL (ref 70–99)
HCO3 SERPL-SCNC: 24 MMOL/L (ref 22–29)
HCT VFR BLD AUTO: 40.4 % (ref 40–53)
HGB BLD-MCNC: 13.6 G/DL (ref 13.3–17.7)
IMM GRANULOCYTES # BLD: 0.1 10E3/UL
IMM GRANULOCYTES NFR BLD: 1 %
LYMPHOCYTES # BLD AUTO: 2.1 10E3/UL (ref 0.8–5.3)
LYMPHOCYTES NFR BLD AUTO: 26 %
MCH RBC QN AUTO: 30.2 PG (ref 26.5–33)
MCHC RBC AUTO-ENTMCNC: 33.7 G/DL (ref 31.5–36.5)
MCV RBC AUTO: 90 FL (ref 78–100)
MONOCYTES # BLD AUTO: 0.5 10E3/UL (ref 0–1.3)
MONOCYTES NFR BLD AUTO: 7 %
NEUTROPHILS # BLD AUTO: 5.1 10E3/UL (ref 1.6–8.3)
NEUTROPHILS NFR BLD AUTO: 63 %
NRBC # BLD AUTO: 0 10E3/UL
NRBC BLD AUTO-RTO: 0 /100
PLATELET # BLD AUTO: 396 10E3/UL (ref 150–450)
POTASSIUM SERPL-SCNC: 3.7 MMOL/L (ref 3.4–5.3)
RBC # BLD AUTO: 4.5 10E6/UL (ref 4.4–5.9)
SODIUM SERPL-SCNC: 136 MMOL/L (ref 135–145)
WBC # BLD AUTO: 8 10E3/UL (ref 4–11)

## 2024-08-05 PROCEDURE — 36415 COLL VENOUS BLD VENIPUNCTURE: CPT | Performed by: STUDENT IN AN ORGANIZED HEALTH CARE EDUCATION/TRAINING PROGRAM

## 2024-08-05 PROCEDURE — 73560 X-RAY EXAM OF KNEE 1 OR 2: CPT | Mod: 26 | Performed by: RADIOLOGY

## 2024-08-05 PROCEDURE — 85049 AUTOMATED PLATELET COUNT: CPT | Performed by: STUDENT IN AN ORGANIZED HEALTH CARE EDUCATION/TRAINING PROGRAM

## 2024-08-05 PROCEDURE — 73630 X-RAY EXAM OF FOOT: CPT | Mod: RT

## 2024-08-05 PROCEDURE — 73560 X-RAY EXAM OF KNEE 1 OR 2: CPT | Mod: LT

## 2024-08-05 PROCEDURE — 82374 ASSAY BLOOD CARBON DIOXIDE: CPT | Performed by: STUDENT IN AN ORGANIZED HEALTH CARE EDUCATION/TRAINING PROGRAM

## 2024-08-05 PROCEDURE — 99285 EMERGENCY DEPT VISIT HI MDM: CPT | Performed by: STUDENT IN AN ORGANIZED HEALTH CARE EDUCATION/TRAINING PROGRAM

## 2024-08-05 PROCEDURE — 99284 EMERGENCY DEPT VISIT MOD MDM: CPT | Performed by: STUDENT IN AN ORGANIZED HEALTH CARE EDUCATION/TRAINING PROGRAM

## 2024-08-05 PROCEDURE — 73630 X-RAY EXAM OF FOOT: CPT | Mod: 26 | Performed by: RADIOLOGY

## 2024-08-05 ASSESSMENT — COLUMBIA-SUICIDE SEVERITY RATING SCALE - C-SSRS
6. HAVE YOU EVER DONE ANYTHING, STARTED TO DO ANYTHING, OR PREPARED TO DO ANYTHING TO END YOUR LIFE?: NO
1. IN THE PAST MONTH, HAVE YOU WISHED YOU WERE DEAD OR WISHED YOU COULD GO TO SLEEP AND NOT WAKE UP?: NO
2. HAVE YOU ACTUALLY HAD ANY THOUGHTS OF KILLING YOURSELF IN THE PAST MONTH?: NO

## 2024-08-05 ASSESSMENT — ACTIVITIES OF DAILY LIVING (ADL)
ADLS_ACUITY_SCORE: 35
ADLS_ACUITY_SCORE: 37
ADLS_ACUITY_SCORE: 35

## 2024-08-05 NOTE — ED TRIAGE NOTES
Came to ED triage ambulatory. Came in due to blister on right leg and fell yesterday and now having left knee pain. Denies hitting the head when he fell.
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
## 2024-08-05 NOTE — PROGRESS NOTES
Patient ambulatory on discharge by self. All discharge reviewed with the patient including follow up with primary care and if needed prescription refill with primary care team. Patient departed alert and responsive.  Patient verbalized understanding. Bus token provided.

## 2024-08-05 NOTE — DISCHARGE INSTRUCTIONS
You were seen and was department due to pain in your foot over this blister as well as pain in your thigh.    You had lab work here that shows your blood sugar is elevated, we recommend that you take your insulin as prescribed.  Your x-ray shows no signs of any infection in your foot, and does show signs of an injury to your thigh on the left side but no signs of any broken bones.  The injury you have is a contusion which is a soft tissue injury.  This will get better with time.  Use Tylenol as needed for pain control.  Return to the emergency department any worsening severe symptoms including significant redness, swelling, fevers, chills, or any other concerning symptoms

## 2024-08-14 ENCOUNTER — DOCUMENTATION ONLY (OUTPATIENT)
Dept: INFECTIOUS DISEASES | Facility: CLINIC | Age: 59
End: 2024-08-14
Payer: COMMERCIAL

## 2024-08-14 NOTE — PROGRESS NOTES
Patient walked in today wanting forms filled out by Dr. Ivory. He is now homeless and trying to get benefits. He had other forms to be filled out and RN explained those need to be filled out by a PCP. Patient was given information for [facility] and he said he would contact them to get in hopefully soon to get in to get forms filled out.       Type of form/letter needing completion: Medical Opinion        Provider: Dr. Ivory        Date form needed: 8/16/24     Once Completed:  form: Fax form too [phone]    Form filled to the best of RN ability then put in Dr. Ivory's folder.     Patient will be back Friday to  a copy of form. He does not have a phone.

## 2024-08-15 NOTE — PROGRESS NOTES
After speaking with Dr. Ivory this morning. Patient will be coming back on Friday to  the form. She asked that we have patient meet with JOMAR Trammell via virtual visit to discuss Medical Opinion forms and help answer the questions. Patient is homeless and doing what he can to get benefits from the state.     Will route this to [facility] to inform that this might happen on 8/16/24 if the patient shows up in clinic as he does not have a phone.       John Bassett RN  Infectious Disease on 8/15/2024 at 1:59 PM     Patient is calling regarding cancelling an appointment.    Date/Time: 2/19 8am    Patient was rescheduled: YES [] NO [x]    Patient requesting call back to reschedule: YES [x] NO []

## 2024-08-16 NOTE — PROGRESS NOTES
LF  I completed the medical portion of the form with John. I checked unknown for some items since I have not formally evaluated him for addictions, chemical dependency, and mental illness.       Faxed form to number provided on the form [phone]. Copy of form sent to HIM to be scanned into the chart. Faxed form kept in faxed file folder at RN's desk for one month. Will wait to see if patient shows up to receive a copy of the form.     John Bassett RN  Infectious Disease on 8/16/2024 at 12:46 PM

## 2024-09-09 DIAGNOSIS — Z79.4 TYPE 2 DIABETES MELLITUS WITH HYPERGLYCEMIA, WITH LONG-TERM CURRENT USE OF INSULIN (H): ICD-10-CM

## 2024-09-09 DIAGNOSIS — E11.65 TYPE 2 DIABETES MELLITUS WITH HYPERGLYCEMIA, WITH LONG-TERM CURRENT USE OF INSULIN (H): ICD-10-CM

## 2024-09-09 RX ORDER — PROCHLORPERAZINE 25 MG/1
1 SUPPOSITORY RECTAL
Qty: 3 EACH | Refills: 11 | OUTPATIENT
Start: 2024-09-09

## 2024-09-09 RX ORDER — PROCHLORPERAZINE 25 MG/1
1 SUPPOSITORY RECTAL
Qty: 1 EACH | Refills: 3 | OUTPATIENT
Start: 2024-09-09

## 2024-09-18 ENCOUNTER — TELEPHONE (OUTPATIENT)
Dept: INFECTIOUS DISEASES | Facility: CLINIC | Age: 59
End: 2024-09-18
Payer: COMMERCIAL

## 2024-09-18 RX ORDER — ACYCLOVIR 400 MG/1
1 TABLET ORAL
Qty: 9 EACH | Refills: 3 | Status: SHIPPED | OUTPATIENT
Start: 2024-09-18

## 2024-09-18 RX ORDER — ACYCLOVIR 400 MG/1
1 TABLET ORAL ONCE
Qty: 1 EACH | Refills: 0 | Status: SHIPPED | OUTPATIENT
Start: 2024-09-18 | End: 2024-09-18

## 2024-09-18 NOTE — TELEPHONE ENCOUNTER
Clinical Pharmacy Note    Sent in Dexcom 7 sensor and  per pharmacy and patient request. Cpa with Dr. Benita Ivory used per Medication Therapy Management encounter 7/25 with Dorie Holland, MalvinD.    Mirlande Iraheta, Pharm D., MPH    Medication Therapy Management Pharmacist   [facility]  (Covering for Dorie Holland while out of office)

## 2024-09-19 NOTE — TELEPHONE ENCOUNTER
Prior Authorization Retail Medication Request    Medication/Dose: Dexcom 7 Sensor and    Diagnosis and ICD code (if different than what is on RX):      New/renewal/insurance change PA/secondary ins. PA:  Previously Tried and Failed:  Dexcom 6   Rationale:  Pt reports that the placement on the abdomen doesn't work for him and it falls of easily.     Insurance   Primary: [employer]  Insurance ID:  [id number]      Pharmacy Information (if different than what is on RX)  Name:  [facility]    Phone:  [phone]  Fax:[phone]    Lenka Mabry CPhT  [facility]  [phone]

## 2024-09-23 NOTE — TELEPHONE ENCOUNTER
PRIOR AUTHORIZATION DENIED    Medication: DEXCOM G7 SENSOR MISC    Insurance Company: [employer] - Phone [phone] Fax [phone]    Denial Date: 9/23/2024    Denial Reason(s): Per Zain at [employer] patient needs to try and fail Freestyle Linda, otherwise the Dexcom G6 that the patient has been using is also on formulary still.

## 2024-09-23 NOTE — TELEPHONE ENCOUNTER
PA Initiation    Medication: DEXCOM G7 SENSOR MISC  Insurance Company: [employer] - Phone [phone] Fax [phone]  Pharmacy Filling the Rx: [facility] [place], [place] - [address] [place] [address] [id number]  Filling Pharmacy Phone: [phone]  Filling Pharmacy Fax: [phone]  Start Date: 9/23/2024

## 2024-09-28 ENCOUNTER — HOSPITAL ENCOUNTER (INPATIENT)
Facility: CLINIC | Age: 59
LOS: 3 days | Discharge: LEFT BEFORE DISCHARGE | End: 2024-10-01
Attending: EMERGENCY MEDICINE | Admitting: STUDENT IN AN ORGANIZED HEALTH CARE EDUCATION/TRAINING PROGRAM
Payer: COMMERCIAL

## 2024-09-28 ENCOUNTER — APPOINTMENT (OUTPATIENT)
Dept: CT IMAGING | Facility: CLINIC | Age: 59
End: 2024-09-28
Attending: EMERGENCY MEDICINE
Payer: COMMERCIAL

## 2024-09-28 DIAGNOSIS — R10.84 GENERALIZED ABDOMINAL PAIN: ICD-10-CM

## 2024-09-28 DIAGNOSIS — K56.600 PARTIAL SMALL BOWEL OBSTRUCTION (H): ICD-10-CM

## 2024-09-28 LAB
ALBUMIN SERPL BCG-MCNC: 4.1 G/DL (ref 3.5–5.2)
ALBUMIN UR-MCNC: 10 MG/DL
ALP SERPL-CCNC: 133 U/L (ref 40–150)
ALT SERPL W P-5'-P-CCNC: 18 U/L (ref 0–70)
ANION GAP SERPL CALCULATED.3IONS-SCNC: 12 MMOL/L (ref 7–15)
APPEARANCE UR: CLEAR
AST SERPL W P-5'-P-CCNC: 17 U/L (ref 0–45)
BASOPHILS # BLD AUTO: 0 10E3/UL (ref 0–0.2)
BASOPHILS NFR BLD AUTO: 0 %
BILIRUB SERPL-MCNC: 0.7 MG/DL
BILIRUB UR QL STRIP: NEGATIVE
BUN SERPL-MCNC: 19 MG/DL (ref 8–23)
CALCIUM SERPL-MCNC: 9.6 MG/DL (ref 8.8–10.4)
CHLORIDE SERPL-SCNC: 96 MMOL/L (ref 98–107)
COLOR UR AUTO: ABNORMAL
CREAT BLD-MCNC: 0.7 MG/DL (ref 0.7–1.3)
CREAT SERPL-MCNC: 0.72 MG/DL (ref 0.67–1.17)
EGFRCR SERPLBLD CKD-EPI 2021: >60 ML/MIN/1.73M2
EGFRCR SERPLBLD CKD-EPI 2021: >90 ML/MIN/1.73M2
EOSINOPHIL # BLD AUTO: 0.2 10E3/UL (ref 0–0.7)
EOSINOPHIL NFR BLD AUTO: 2 %
ERYTHROCYTE [DISTWIDTH] IN BLOOD BY AUTOMATED COUNT: 12.6 % (ref 10–15)
GLUCOSE BLDC GLUCOMTR-MCNC: 274 MG/DL (ref 70–99)
GLUCOSE BLDC GLUCOMTR-MCNC: 278 MG/DL (ref 70–99)
GLUCOSE BLDC GLUCOMTR-MCNC: 289 MG/DL (ref 70–99)
GLUCOSE SERPL-MCNC: 379 MG/DL (ref 70–99)
GLUCOSE UR STRIP-MCNC: >=1000 MG/DL
HCO3 SERPL-SCNC: 24 MMOL/L (ref 22–29)
HCT VFR BLD AUTO: 41.6 % (ref 40–53)
HGB BLD-MCNC: 14.5 G/DL (ref 13.3–17.7)
HGB UR QL STRIP: NEGATIVE
IMM GRANULOCYTES # BLD: 0.1 10E3/UL
IMM GRANULOCYTES NFR BLD: 1 %
KETONES UR STRIP-MCNC: NEGATIVE MG/DL
LEUKOCYTE ESTERASE UR QL STRIP: NEGATIVE
LIPASE SERPL-CCNC: 54 U/L (ref 13–60)
LYMPHOCYTES # BLD AUTO: 2.7 10E3/UL (ref 0.8–5.3)
LYMPHOCYTES NFR BLD AUTO: 28 %
MCH RBC QN AUTO: 30 PG (ref 26.5–33)
MCHC RBC AUTO-ENTMCNC: 34.9 G/DL (ref 31.5–36.5)
MCV RBC AUTO: 86 FL (ref 78–100)
MONOCYTES # BLD AUTO: 0.6 10E3/UL (ref 0–1.3)
MONOCYTES NFR BLD AUTO: 7 %
NEUTROPHILS # BLD AUTO: 6 10E3/UL (ref 1.6–8.3)
NEUTROPHILS NFR BLD AUTO: 62 %
NITRATE UR QL: NEGATIVE
NRBC # BLD AUTO: 0 10E3/UL
NRBC BLD AUTO-RTO: 0 /100
PH UR STRIP: 6.5 [PH] (ref 5–7)
PLATELET # BLD AUTO: 424 10E3/UL (ref 150–450)
POTASSIUM SERPL-SCNC: 3.5 MMOL/L (ref 3.4–5.3)
PROT SERPL-MCNC: 7.8 G/DL (ref 6.4–8.3)
RBC # BLD AUTO: 4.84 10E6/UL (ref 4.4–5.9)
RBC URINE: 1 /HPF
SODIUM SERPL-SCNC: 132 MMOL/L (ref 135–145)
SP GR UR STRIP: 1.01 (ref 1–1.03)
UROBILINOGEN UR STRIP-MCNC: NORMAL MG/DL
WBC # BLD AUTO: 9.6 10E3/UL (ref 4–11)
WBC URINE: 0 /HPF

## 2024-09-28 PROCEDURE — 99223 1ST HOSP IP/OBS HIGH 75: CPT | Mod: FS | Performed by: STUDENT IN AN ORGANIZED HEALTH CARE EDUCATION/TRAINING PROGRAM

## 2024-09-28 PROCEDURE — 96374 THER/PROPH/DIAG INJ IV PUSH: CPT | Mod: 59 | Performed by: EMERGENCY MEDICINE

## 2024-09-28 PROCEDURE — 99222 1ST HOSP IP/OBS MODERATE 55: CPT | Mod: GC | Performed by: SURGERY

## 2024-09-28 PROCEDURE — 96375 TX/PRO/DX INJ NEW DRUG ADDON: CPT | Performed by: EMERGENCY MEDICINE

## 2024-09-28 PROCEDURE — 83690 ASSAY OF LIPASE: CPT | Performed by: EMERGENCY MEDICINE

## 2024-09-28 PROCEDURE — 99285 EMERGENCY DEPT VISIT HI MDM: CPT | Mod: 25 | Performed by: EMERGENCY MEDICINE

## 2024-09-28 PROCEDURE — 74177 CT ABD & PELVIS W/CONTRAST: CPT | Mod: 26 | Performed by: RADIOLOGY

## 2024-09-28 PROCEDURE — 99207 PR APP CREDIT; MD BILLING SHARED VISIT: CPT | Performed by: PHYSICIAN ASSISTANT

## 2024-09-28 PROCEDURE — 96361 HYDRATE IV INFUSION ADD-ON: CPT | Performed by: EMERGENCY MEDICINE

## 2024-09-28 PROCEDURE — 85025 COMPLETE CBC W/AUTO DIFF WBC: CPT | Performed by: EMERGENCY MEDICINE

## 2024-09-28 PROCEDURE — 99285 EMERGENCY DEPT VISIT HI MDM: CPT | Performed by: EMERGENCY MEDICINE

## 2024-09-28 PROCEDURE — 258N000003 HC RX IP 258 OP 636: Performed by: PHYSICIAN ASSISTANT

## 2024-09-28 PROCEDURE — 120N000002 HC R&B MED SURG/OB UMMC

## 2024-09-28 PROCEDURE — 74177 CT ABD & PELVIS W/CONTRAST: CPT

## 2024-09-28 PROCEDURE — 250N000011 HC RX IP 250 OP 636: Performed by: EMERGENCY MEDICINE

## 2024-09-28 PROCEDURE — 36415 COLL VENOUS BLD VENIPUNCTURE: CPT | Performed by: EMERGENCY MEDICINE

## 2024-09-28 PROCEDURE — 250N000012 HC RX MED GY IP 250 OP 636 PS 637: Performed by: PHYSICIAN ASSISTANT

## 2024-09-28 PROCEDURE — 80053 COMPREHEN METABOLIC PANEL: CPT | Performed by: EMERGENCY MEDICINE

## 2024-09-28 PROCEDURE — 81001 URINALYSIS AUTO W/SCOPE: CPT | Performed by: EMERGENCY MEDICINE

## 2024-09-28 PROCEDURE — 82962 GLUCOSE BLOOD TEST: CPT

## 2024-09-28 PROCEDURE — 999N000248 HC STATISTIC IV INSERT WITH US BY RN

## 2024-09-28 PROCEDURE — 82565 ASSAY OF CREATININE: CPT

## 2024-09-28 PROCEDURE — 258N000003 HC RX IP 258 OP 636: Performed by: EMERGENCY MEDICINE

## 2024-09-28 RX ORDER — HYDROMORPHONE HYDROCHLORIDE 1 MG/ML
0.5 INJECTION, SOLUTION INTRAMUSCULAR; INTRAVENOUS; SUBCUTANEOUS
Status: DISCONTINUED | OUTPATIENT
Start: 2024-09-28 | End: 2024-09-29

## 2024-09-28 RX ORDER — DEXTROSE MONOHYDRATE 25 G/50ML
25-50 INJECTION, SOLUTION INTRAVENOUS
Status: DISCONTINUED | OUTPATIENT
Start: 2024-09-28 | End: 2024-10-01 | Stop reason: HOSPADM

## 2024-09-28 RX ORDER — ONDANSETRON 4 MG/1
4 TABLET, ORALLY DISINTEGRATING ORAL EVERY 6 HOURS PRN
Status: DISCONTINUED | OUTPATIENT
Start: 2024-09-28 | End: 2024-10-01 | Stop reason: HOSPADM

## 2024-09-28 RX ORDER — IOPAMIDOL 755 MG/ML
92 INJECTION, SOLUTION INTRAVASCULAR ONCE
Status: COMPLETED | OUTPATIENT
Start: 2024-09-28 | End: 2024-09-28

## 2024-09-28 RX ORDER — NICOTINE POLACRILEX 4 MG
15-30 LOZENGE BUCCAL
Status: DISCONTINUED | OUTPATIENT
Start: 2024-09-28 | End: 2024-10-01 | Stop reason: HOSPADM

## 2024-09-28 RX ORDER — ONDANSETRON 2 MG/ML
4 INJECTION INTRAMUSCULAR; INTRAVENOUS ONCE
Status: COMPLETED | OUTPATIENT
Start: 2024-09-28 | End: 2024-09-28

## 2024-09-28 RX ORDER — ONDANSETRON 2 MG/ML
4 INJECTION INTRAMUSCULAR; INTRAVENOUS EVERY 6 HOURS PRN
Status: DISCONTINUED | OUTPATIENT
Start: 2024-09-28 | End: 2024-10-01 | Stop reason: HOSPADM

## 2024-09-28 RX ORDER — FAMOTIDINE 20 MG/1
40 TABLET, FILM COATED ORAL DAILY
Status: DISCONTINUED | OUTPATIENT
Start: 2024-09-28 | End: 2024-09-29

## 2024-09-28 RX ORDER — SODIUM CHLORIDE, SODIUM LACTATE, POTASSIUM CHLORIDE, CALCIUM CHLORIDE 600; 310; 30; 20 MG/100ML; MG/100ML; MG/100ML; MG/100ML
INJECTION, SOLUTION INTRAVENOUS CONTINUOUS
Status: ACTIVE | OUTPATIENT
Start: 2024-09-28 | End: 2024-09-29

## 2024-09-28 RX ORDER — LIDOCAINE 40 MG/G
CREAM TOPICAL
Status: DISCONTINUED | OUTPATIENT
Start: 2024-09-28 | End: 2024-10-01 | Stop reason: HOSPADM

## 2024-09-28 RX ORDER — HYDROMORPHONE HYDROCHLORIDE 1 MG/ML
0.5 INJECTION, SOLUTION INTRAMUSCULAR; INTRAVENOUS; SUBCUTANEOUS ONCE
Status: COMPLETED | OUTPATIENT
Start: 2024-09-28 | End: 2024-09-28

## 2024-09-28 RX ORDER — HYDROMORPHONE HYDROCHLORIDE 1 MG/ML
0.5 INJECTION, SOLUTION INTRAMUSCULAR; INTRAVENOUS; SUBCUTANEOUS ONCE
Status: DISCONTINUED | OUTPATIENT
Start: 2024-09-28 | End: 2024-10-01 | Stop reason: HOSPADM

## 2024-09-28 RX ADMIN — SODIUM CHLORIDE, POTASSIUM CHLORIDE, SODIUM LACTATE AND CALCIUM CHLORIDE: 600; 310; 30; 20 INJECTION, SOLUTION INTRAVENOUS at 14:14

## 2024-09-28 RX ADMIN — IOPAMIDOL 92 ML: 755 INJECTION, SOLUTION INTRAVENOUS at 11:11

## 2024-09-28 RX ADMIN — INSULIN ASPART 3 UNITS: 100 INJECTION, SOLUTION INTRAVENOUS; SUBCUTANEOUS at 14:13

## 2024-09-28 RX ADMIN — INSULIN GLARGINE 10 UNITS: 100 INJECTION, SOLUTION SUBCUTANEOUS at 23:35

## 2024-09-28 RX ADMIN — INSULIN ASPART 3 UNITS: 100 INJECTION, SOLUTION INTRAVENOUS; SUBCUTANEOUS at 23:35

## 2024-09-28 RX ADMIN — SODIUM CHLORIDE 1000 ML: 9 INJECTION, SOLUTION INTRAVENOUS at 09:47

## 2024-09-28 RX ADMIN — HYDROMORPHONE HYDROCHLORIDE 0.5 MG: 1 INJECTION, SOLUTION INTRAMUSCULAR; INTRAVENOUS; SUBCUTANEOUS at 13:18

## 2024-09-28 RX ADMIN — ONDANSETRON 4 MG: 2 INJECTION INTRAMUSCULAR; INTRAVENOUS at 09:48

## 2024-09-28 ASSESSMENT — ACTIVITIES OF DAILY LIVING (ADL)
ADLS_ACUITY_SCORE: 37
ADLS_ACUITY_SCORE: 35
ADLS_ACUITY_SCORE: 37

## 2024-09-28 NOTE — ED TRIAGE NOTES
PT arrives stating he believes he has a bowel obstruction with 10/10 generalized abd pain accompanied with nausea. PT appears drowsy in triage and sleeping in lobby. PT states his last BM was last night.      Triage Assessment (Adult)       Row Name 09/28/24 0823          Triage Assessment    Airway WDL WDL        Respiratory WDL    Respiratory WDL WDL        Skin Circulation/Temperature WDL    Skin Circulation/Temperature WDL WDL        Cardiac WDL    Cardiac WDL WDL        Peripheral/Neurovascular WDL    Peripheral Neurovascular WDL WDL        Cognitive/Neuro/Behavioral WDL    Cognitive/Neuro/Behavioral WDL WDL                     
good air movement/airway patent/breath sounds equal/respirations non-labored

## 2024-09-28 NOTE — ED PROVIDER NOTES
ED Provider Note  [facility]      History     Chief Complaint   Patient presents with    Constipation     HPI  Andrew Collier is a 58 year old male with a history of GERD, small bowel obstructions, insulin-dependent diabetes, polysubstance abuse, HIV, hyperlipidemia, CNS toxoplasmosis who presents to the emergency department with abdominal pain and nausea.  Here in the ED, patient appears drowsy and was sleeping in the lobby.  He reports that he believes he has a bowel obstruction.  His last bowel movement was last night he and he started vomiting at 5 AM today.  Patient reports that he has vomited 19 times today and has not been passing gas.  He notes that his abdominal pain is diffuse, not localized to one specific area.  He denies fever.    Chart review:  CT ABDOMEN PELVIS WITH CONTRAST AT [facility] (7/13/2024)  1. Partially obstructed small bowel with partially decompressed small  bowel distal to transition point in the mid abdomen.  2. Diffuse wall thickening of the visualized esophagus suggesting  esophagitis, possibly related to reported vomiting.  3. Diffuse wall thickening of the urinary bladder may indicate chronic  outlet obstruction versus cystitis..  4. Cholelithiasis without evidence cholecystitis.     Past Medical History  Past Medical History:   Diagnosis Date    Acute appendicitis with localized peritonitis 1/31/2018    AIDS (H)     Allergic rhinitis due to other allergen     DNS    Anal dysplasia     Chronic abdominal pain     CNS toxoplasmosis (H)     COVID-19 1/11/2022    Diabetes type 2, controlled (H)     GERD (gastroesophageal reflux disease)     Herpes zoster 9/23/2016    History of seizure     History of substance abuse (H)     HIV (human immunodeficiency virus infection) (H)     HLD (hyperlipidemia)     Lung nodules     Periungual wart     Pneumonia 1/6/2019    PTSD (post-traumatic stress disorder)     Sleep apnea     doesn't use CPAP      Past Surgical History:   Procedure Laterality Date    ANOSCOPY N/A 9/9/2020    Procedure: Exam Under Anesthesia, ANOSCOPY, fulgeration of rectal fissures with Rectal Biopsies;  Surgeon: Thanh Lundberg MD;  Location: [facility] OR    COLONOSCOPY Left 1/22/2016    Procedure: COMBINED COLONOSCOPY, SINGLE OR MULTIPLE BIOPSY/POLYPECTOMY BY BIOPSY;  Surgeon: Clark aSini MD;  Location: [facility] GI    ESOPHAGOSCOPY, GASTROSCOPY, DUODENOSCOPY (EGD), COMBINED N/A 6/6/2022    Procedure: ESOPHAGOGASTRODUODENOSCOPY, WITH BIOPSY;  Surgeon: Kecia Benítez MD;  Location: [facility] GI    HC EXPLORE UNDESC TESTIS,INGUIN/SCROTAL      LAPAROSCOPIC APPENDECTOMY N/A 1/31/2018    Procedure: LAPAROSCOPIC APPENDECTOMY;  LAPAROSCOPIC APPENDECTOMY;  Surgeon: Dawn Holt MD;  Location: [facility]    LAPAROSCOPY DIAGNOSTIC (GENERAL) N/A 7/26/2016    Procedure: LAPAROSCOPY DIAGNOSTIC (GENERAL);  Surgeon: Susannah Arriaga MD;  Location: [facility]    LAPAROSCOPY DIAGNOSTIC (GENERAL) N/A 4/16/2018    Procedure: LAPAROSCOPY DIAGNOSTIC (GENERAL);  Diagnostic laparoscopy and lysis of adhesions;  Surgeon: Prince Dowling MD;  Location: [facility] OR    OPTICAL TRACKING SYSTEM CRANIOTOMY, EXCISE TUMOR, COMBINED Left 4/10/2015    Procedure: COMBINED OPTICAL TRACKING SYSTEM CRANIOTOMY, EXCISE TUMOR;  Surgeon: Mirlande Colmenares MD;  Location: [facility] OR    REPAIR GAMEKEEPER'S THUMB Right 12/2/2016    Procedure: REPAIR LIGAMENT ULNAR COLLATERAL THUMB (GAMEKEEPER'S);  Surgeon: Evin Zamorano MD;  Location: [facility] OR    ZZC NONSPECIFIC PROCEDURE      right forearm fracture     bictegravir-emtricitabine-tenofovir (BIKTARVY) -25 MG per tablet  blood glucose (NO BRAND SPECIFIED) lancets standard  blood glucose (NO BRAND SPECIFIED) test strip  blood glucose monitoring (NO BRAND SPECIFIED) meter device kit  Continuous Glucose Sensor (DEXCOM G6 SENSOR) MISC  Continuous Glucose Sensor (DEXCOM G7 SENSOR) MISC  Continuous Glucose Transmitter (DEXCOM G6  "TRANSMITTER) MISC  famotidine (PEPCID) 40 MG tablet  glipiZIDE (GLUCOTROL XL) 10 MG 24 hr tablet  insulin glargine (LANTUS PEN) 100 UNIT/ML pen  insulin pen needle (32G X 4 MM) 32G X 4 MM miscellaneous  Nutritional Supplements (ENSURE ACTIVE) LIQD  polyethylene glycol (MIRALAX) 17 GM/Dose powder  senna-docusate (SENOKOT-S/PERICOLACE) 8.6-50 MG tablet      Allergies   Allergen Reactions    Fentanyl Blisters     Per pt, after taking this medication had blisters develope    Tylenol [Acetaminophen] Itching    Dulaglutide Rash    Insulin Lispro Rash     Patient reported    Penicillin V Other (See Comments) and Rash     Diffuse maculopapular rash + feels \"high\", per pt.      Family History  Family History   Problem Relation Age of Onset    Diabetes Brother     Diabetes Father     Alzheimer Disease Father     Unknown/Adopted Mother     Diabetes Paternal Grandfather     Cancer No family hx of         no skin cancer    Skin Cancer No family hx of         no famiy hx of skin cancer    Glaucoma No family hx of     Macular Degeneration No family hx of      Social History   Social History     Tobacco Use    Smoking status: Some Days     Current packs/day: 0.25     Average packs/day: 0.3 packs/day for 40.0 years (10.0 ttl pk-yrs)     Types: Cigarettes    Smokeless tobacco: Former   Substance Use Topics    Alcohol use: No     Alcohol/week: 0.0 standard drinks of alcohol     Comment: Last etoh in 2007    Drug use: Not Currently     Types: Marijuana, Methamphetamines      A medically appropriate review of systems was performed with pertinent positives and negatives noted in the HPI, and all other systems negative.    Physical Exam   BP: 136/89  Pulse: 73  Temp: 98.1  F (36.7  C)  Resp: 14  SpO2: 93 %  Physical Exam  Physical Exam   Constitutional:   well nourished, well developed, sitting in the cardenas chair, appears somnolent  HENT:   Head: Normocephalic and atraumatic.   Neck:   no adenopathy, no bony tenderness  Cardiovascular: " regular rate and rhythm without murmurs or gallops  Pulmonary/Chest: Clear to auscultation bilaterally, with no wheezes or retractions. No respiratory distress.  GI: Soft with good bowel sounds. Diffusely tender,  non-distended, with no guarding, no rebound, no peritoneal signs.   Back:  No bony or CVA tenderness   Musculoskeletal:  no edema  Skin: Skin is warm and dry. No rash noted.   Neurological: alert and oriented to person, place, and time. Nonfocal exam  Psychiatric: Patient appears somnolent, whispering voice, no suicidal or homicidal thoughts    ED Course, Procedures, & Data      Procedures            Results for orders placed or performed during the hospital encounter of 09/28/24   CT Abdomen Pelvis w Contrast     Status: None    Narrative    EXAMINATION: CT ABDOMEN PELVIS W CONTRAST, 9/28/2024 11:33 AM    TECHNIQUE:  Helical CT images from the thoracic inlet through the  symphysis pubis were obtained  with contrast. Contrast dose: 92ml  Isovue 370    COMPARISON: CT abdomen 7/13/2024    HISTORY: abdl pain h/o SBO    FINDINGS:  Lower chest: Mild bibasilar atelectasis Liver: Within normal limits    Biliary System: Calcified gallstone within the gallbladder neck,  unchanged. No gallbladder wall thickening or pericholecystic  inflammatory changes.    Spleen: Within normal limits    Pancreas: Within normal limits    Adrenal glands: Within normal limits    Kidneys: Within normal limits    Gastrointestinal: Dilated gas and fluid-filled segment of small bowel  in the central abdomen with focal transition point visualized at the  right lower quadrant (series 3, image 54). Fecalized small bowel  contents just proximal to this point. Appendectomy. Distended post  prandial appearance of the stomach.    Peritoneum: No free air or fluid    Lymph nodes: No lymphadenopathy    Vasculature: Dense atherosclerotic calcifications at the superior  mesentery territory artery ostium.    Reproductive Organs: Penile prosthesis,  with reservoir embedded  anterior to the left bladder.    Bladder: Within normal limits    Abdominal wall: Within normal limits    Musculoskeletal: Within normal limits      Impression    IMPRESSION:  1.  Dilated loops of small bowel with a transition point in the right  lower quadrant and associated fecalization of the small bowel  contents, suggestive of delayed transit and likely partial small bowel  obstruction.  2.  Uncomplicated cholelithiasis, unchanged.    **Findings communicated to emergency medicine physician Dr. Juanita Portillo MD reviewed telephone conversation at 12:20 PM on 9/20/2024**    I have personally reviewed the examination and initial interpretation  and I agree with the findings.    JOSE VITAL,          SYSTEM ID:  [id number]   Comprehensive metabolic panel     Status: Abnormal   Result Value Ref Range    Sodium 132 (L) 135 - 145 mmol/L    Potassium 3.5 3.4 - 5.3 mmol/L    Carbon Dioxide (CO2) 24 22 - 29 mmol/L    Anion Gap 12 7 - 15 mmol/L    Urea Nitrogen 19.0 8.0 - 23.0 mg/dL    Creatinine 0.72 0.67 - 1.17 mg/dL    GFR Estimate >90 >60 mL/min/1.73m2    Calcium 9.6 8.8 - 10.4 mg/dL    Chloride 96 (L) 98 - 107 mmol/L    Glucose 379 (H) 70 - 99 mg/dL    Alkaline Phosphatase 133 40 - 150 U/L    AST 17 0 - 45 U/L    ALT 18 0 - 70 U/L    Protein Total 7.8 6.4 - 8.3 g/dL    Albumin 4.1 3.5 - 5.2 g/dL    Bilirubin Total 0.7 <=1.2 mg/dL   Lipase     Status: Normal   Result Value Ref Range    Lipase 54 13 - 60 U/L   UA with Microscopic reflex to Culture     Status: Abnormal    Specimen: Urine, Midstream   Result Value Ref Range    Color Urine Light Yellow Colorless, Straw, Light Yellow, Yellow    Appearance Urine Clear Clear    Glucose Urine >=1000 (A) Negative mg/dL    Bilirubin Urine Negative Negative    Ketones Urine Negative Negative mg/dL    Specific Gravity Urine 1.010 1.003 - 1.035    Blood Urine Negative Negative    pH Urine 6.5 5.0 - 7.0    Protein Albumin Urine 10 (A) Negative mg/dL     Urobilinogen Urine Normal Normal, 2.0 mg/dL    Nitrite Urine Negative Negative    Leukocyte Esterase Urine Negative Negative    RBC Urine 1 <=2 /HPF    WBC Urine 0 <=5 /HPF    Narrative    Urine Culture not indicated   CBC with platelets and differential     Status: None   Result Value Ref Range    WBC Count 9.6 4.0 - 11.0 10e3/uL    RBC Count 4.84 4.40 - 5.90 10e6/uL    Hemoglobin 14.5 13.3 - 17.7 g/dL    Hematocrit 41.6 40.0 - 53.0 %    MCV 86 78 - 100 fL    MCH 30.0 26.5 - 33.0 pg    MCHC 34.9 31.5 - 36.5 g/dL    RDW 12.6 10.0 - 15.0 %    Platelet Count 424 150 - 450 10e3/uL    % Neutrophils 62 %    % Lymphocytes 28 %    % Monocytes 7 %    % Eosinophils 2 %    % Basophils 0 %    % Immature Granulocytes 1 %    NRBCs per 100 WBC 0 <1 /100    Absolute Neutrophils 6.0 1.6 - 8.3 10e3/uL    Absolute Lymphocytes 2.7 0.8 - 5.3 10e3/uL    Absolute Monocytes 0.6 0.0 - 1.3 10e3/uL    Absolute Eosinophils 0.2 0.0 - 0.7 10e3/uL    Absolute Basophils 0.0 0.0 - 0.2 10e3/uL    Absolute Immature Granulocytes 0.1 <=0.4 10e3/uL    Absolute NRBCs 0.0 10e3/uL   CBC with platelets differential     Status: None    Narrative    The following orders were created for panel order CBC with platelets differential.  Procedure                               Abnormality         Status                     ---------                               -----------         ------                     CBC with platelets and d...[[id number]]                      Final result                 Please view results for these tests on the individual orders.     Medications   HYDROmorphone (PF) (DILAUDID) injection 0.5 mg (0 mg Intravenous Hold 9/28/24 1154)   sodium chloride 0.9% BOLUS 1,000 mL (0 mLs Intravenous Stopped 9/28/24 1149)   ondansetron (ZOFRAN) injection 4 mg (4 mg Intravenous $Given 9/28/24 0948)   iopamidol (ISOVUE-370) solution 92 mL (92 mLs Intravenous $Given 9/28/24 1111)   sodium chloride (PF) 0.9% PF flush 73 mL (73 mLs Intravenous $Given  9/28/24 1111)     Labs Ordered and Resulted from Time of ED Arrival to Time of ED Departure   COMPREHENSIVE METABOLIC PANEL - Abnormal       Result Value    Sodium 132 (*)     Potassium 3.5      Carbon Dioxide (CO2) 24      Anion Gap 12      Urea Nitrogen 19.0      Creatinine 0.72      GFR Estimate >90      Calcium 9.6      Chloride 96 (*)     Glucose 379 (*)     Alkaline Phosphatase 133      AST 17      ALT 18      Protein Total 7.8      Albumin 4.1      Bilirubin Total 0.7     ROUTINE UA WITH MICROSCOPIC REFLEX TO CULTURE - Abnormal    Color Urine Light Yellow      Appearance Urine Clear      Glucose Urine >=1000 (*)     Bilirubin Urine Negative      Ketones Urine Negative      Specific Gravity Urine 1.010      Blood Urine Negative      pH Urine 6.5      Protein Albumin Urine 10 (*)     Urobilinogen Urine Normal      Nitrite Urine Negative      Leukocyte Esterase Urine Negative      RBC Urine 1      WBC Urine 0     LIPASE - Normal    Lipase 54     CBC WITH PLATELETS AND DIFFERENTIAL    WBC Count 9.6      RBC Count 4.84      Hemoglobin 14.5      Hematocrit 41.6      MCV 86      MCH 30.0      MCHC 34.9      RDW 12.6      Platelet Count 424      % Neutrophils 62      % Lymphocytes 28      % Monocytes 7      % Eosinophils 2      % Basophils 0      % Immature Granulocytes 1      NRBCs per 100 WBC 0      Absolute Neutrophils 6.0      Absolute Lymphocytes 2.7      Absolute Monocytes 0.6      Absolute Eosinophils 0.2      Absolute Basophils 0.0      Absolute Immature Granulocytes 0.1      Absolute NRBCs 0.0     ISTAT CREATININE POCT     CT Abdomen Pelvis w Contrast   Final Result   IMPRESSION:   1.  Dilated loops of small bowel with a transition point in the right   lower quadrant and associated fecalization of the small bowel   contents, suggestive of delayed transit and likely partial small bowel   obstruction.   2.  Uncomplicated cholelithiasis, unchanged.      **Findings communicated to emergency medicine physician  Dr. Juanita Portillo MD reviewed telephone conversation at 12:20 PM on 9/20/2024**      I have personally reviewed the examination and initial interpretation   and I agree with the findings.      JOSE VITAL DO            SYSTEM ID:  [id number]             Critical care was not performed.     Medical Decision Making  The patient's presentation was of high complexity (a chronic illness severe exacerbation, progression, or side effect of treatment).    The patient's evaluation involved:  review of external note(s) from 2 sources (prior ED and office visits)  review of 3+ test result(s) ordered prior to this encounter (prior laboratory and imaging studies)  ordering and/or review of 3+ test(s) in this encounter (see separate area of note for details)  independent interpretation of testing performed by another health professional (I independently reviewed the imaging studies)  discussion of management or test interpretation with another health professional (Surgery, triage hospitalist)    The patient's management necessitated moderate risk (IV contrast administration) and high risk (a decision regarding hospitalization).    Assessment & Plan        I have reviewed the nursing notes.   Emergency Department course:  The patient was seen and examined at 0839 am in [facility].  An IV was established and I treated with a normal saline bolus, Zofran IV, and Dilaudid IV.  Laboratory studies show an unremarkable CBC, with a WBC of 9.6 and hemoglobin of 14.5.    Comprehensive metabolic panel shows hyponatremia, with a sodium of 132.  Glucose is elevated at 369.  Lipase is unremarkable at 54.  CT of the abdomen and pelvis with contrast shows IMPRESSION:  1.  Markedly dilated segments of small bowel with a lead point in the  right lower quadrant, with equalization of the small bowel contents,  suggestive of delayed transit and likely partial bowel obstruction.  2.  Uncomplicated cholelithiasis, unchanged.       I have consulted  surgery.  I discussed this patient with surgery and they recommend placing an NG tube.  This has been ordered.    Andrew Collier is a 58 year old male with a history of recurrent small bowel obstructions and diabetes as well as polysubstance abuse who presents with abdominal pain and concern for bowel obstruction.  CT does show partial small bowel obstruction.  An NG tube has been ordered.  He will be admitted to the medicine service for further evaluation and treatment  I spoke with triage hospitalist Dr. Carrie Seay regarding admission.    I have reviewed the findings, diagnosis, plan and need for follow up with the patient.    New Prescriptions    No medications on file       Final diagnoses:   Partial small bowel obstruction (H)   Generalized abdominal pain   I, Shabnam Ojeda, am serving as a trained medical scribe to document services personally performed by Juanita Portillo MD, based on the provider's statements to me.     I, Juanita Portillo MD, was physically present and have reviewed and verified the accuracy of this note documented by Shabnam Ojeda.   This note was created in part by the use of Dragon voice recognition dictation system. Inadvertent grammatical errors and typographical errors may still exist.  MD Juanita Chen MD  [facility] EMERGENCY DEPARTMENT  9/28/2024        Juanita Portillo MD  09/28/24 [id number]

## 2024-09-28 NOTE — CONSULTS
[facility]    Consult Note - Emergency General Surgery Service  Date of Admission:  9/28/2024  Consult Requested by: ED  Reason for Consult: partial bowel obstruction    Assessment & Plan: Surgery   Andrew Collier is a 58 year old male admitted on 9/28/2024 with signs and symptoms of SBO. Presented with     - no surgical intervention at this time  - keep NPO, ok for 1/2 cup ice chips per shift for comfort, please keep strict track of I/Os  - recommend NGT decompression - patient refused at this time  - no abx indicated at this time  - pain control per primary team - avoid narcotic pain meds as possible  - optimise electrolytes  - EGS will continue to follow      Clinically Significant Risk Factors Present on Admission         # Hyponatremia: Lowest Na = 132 mmol/L in last 2 days, will monitor as appropriate                 # DMII: A1C = 11.7 % (Ref range: <5.7 %) within past 6 months       # Financial/Environmental Concerns:         D/w chief, Dr Driscoll, and attending, Dr Dowling.     Farhana Yancey MD PGY3  [facility]  Non-urgent messages: Securely message with [employer] (more info)  Text page via [employer] Paging/Directory     ______________________________________________________________________    Chief Complaint   Abdominal pain    History is obtained from the patient and chart review    History of Present Illness   Andrew Collier is a 58 year old male known to the surgery service with h/o recurrent SBO who presented to the hospital on 09/28/24 with a 6-10hrs h/o abdominal pain, nausea, and vomiting starting earlier this morning. He recognised the symptoms and presented to hospital for evaluation. He last had a BM yesterday evening which he reports was smaller than normal for him. He is not passing gas. He reports he vomited a few times and feels better since then, not as bloated. Denies nausea on  exam in the ED.    ED workup shows normal vitals, largely normal BMP, CBC, and UA, and CT imaging with dilated loops of small bowel.      PMH: HIV, T2DM, substance use, h/o perforated appendicitis s/p appendectomy, recurrent SBO    Past Medical History    Past Medical History:   Diagnosis Date    Acute appendicitis with localized peritonitis 1/31/2018    AIDS (H)     Allergic rhinitis due to other allergen     DNS    Anal dysplasia     Chronic abdominal pain     CNS toxoplasmosis (H)     COVID-19 1/11/2022    Diabetes type 2, controlled (H)     GERD (gastroesophageal reflux disease)     Herpes zoster 9/23/2016    History of seizure     History of substance abuse (H)     HIV (human immunodeficiency virus infection) (H)     HLD (hyperlipidemia)     Lung nodules     Periungual wart     Pneumonia 1/6/2019    PTSD (post-traumatic stress disorder)     Sleep apnea     doesn't use CPAP       Past Surgical History   Past Surgical History:   Procedure Laterality Date    ANOSCOPY N/A 9/9/2020    Procedure: Exam Under Anesthesia, ANOSCOPY, fulgeration of rectal fissures with Rectal Biopsies;  Surgeon: Thanh Lundberg MD;  [facility]    COLONOSCOPY Left 1/22/2016    Procedure: COMBINED COLONOSCOPY, SINGLE OR MULTIPLE BIOPSY/POLYPECTOMY BY BIOPSY;  Surgeon: Clark Saini MD;  [facility]    ESOPHAGOSCOPY, GASTROSCOPY, DUODENOSCOPY (EGD), COMBINED N/A 6/6/2022    Procedure: ESOPHAGOGASTRODUODENOSCOPY, WITH BIOPSY;  Surgeon: Kecia Benítez MD;  Location: [facility] GI    HC EXPLORE UNDESC TESTIS,INGUIN/SCROTAL      LAPAROSCOPIC APPENDECTOMY N/A 1/31/2018    Procedure: LAPAROSCOPIC APPENDECTOMY;  LAPAROSCOPIC APPENDECTOMY;  Surgeon: Dawn Holt MD;  [facility]    LAPAROSCOPY DIAGNOSTIC (GENERAL) N/A 7/26/2016    Procedure: LAPAROSCOPY DIAGNOSTIC (GENERAL);  Surgeon: Susannah Arriaga MD;  [facility]    LAPAROSCOPY DIAGNOSTIC (GENERAL) N/A 4/16/2018    Procedure: LAPAROSCOPY DIAGNOSTIC  (GENERAL);  Diagnostic laparoscopy and lysis of adhesions;  Surgeon: Prince Dowling MD;  [facility]    OPTICAL TRACKING SYSTEM CRANIOTOMY, EXCISE TUMOR, COMBINED Left 4/10/2015    Procedure: COMBINED OPTICAL TRACKING SYSTEM CRANIOTOMY, EXCISE TUMOR;  Surgeon: Mirlande Colmenares MD;  [facility]    REPAIR GAMEKEEPER'S THUMB Right 12/2/2016    Procedure: REPAIR LIGAMENT ULNAR COLLATERAL THUMB (GAMEKEEPER'S);  Surgeon: Evin Zamorano MD;  Location:  OR    [facility] NONSPECIFIC PROCEDURE      right forearm fracture       Prior to Admission Medications   I have reviewed this patient's current medications  Current Facility-Administered Medications   Medication Dose Route Frequency Provider Last Rate Last Admin    HYDROmorphone (PF) (DILAUDID) injection 0.5 mg  0.5 mg Intravenous Once Juanita Portillo MD         Current Outpatient Medications   Medication Sig Dispense Refill    bictegravir-emtricitabine-tenofovir (BIKTARVY) -25 MG per tablet Take 1 tablet by mouth daily 30 tablet 2    blood glucose (NO BRAND SPECIFIED) lancets standard Use to test blood sugar 1 time daily or as directed. 100 Lancet 4    blood glucose (NO BRAND SPECIFIED) test strip Use to test blood sugar 1 time daily or as directed. 100 strip 4    blood glucose monitoring (NO BRAND SPECIFIED) meter device kit Use to test blood sugar 3 times daily or as directed. 1 kit 0    Continuous Glucose Sensor (DEXCOM G6 SENSOR) MISC 1 each every 10 days Change every 10 days. 3 each 11    Continuous Glucose Sensor (DEXCOM G7 SENSOR) MISC 1 Device every 10 days. 9 each 3    Continuous Glucose Transmitter (DEXCOM G6 TRANSMITTER) MISC 1 each every 3 months Change every 3 months. 1 each 3    famotidine (PEPCID) 40 MG tablet Take 1 tablet (40 mg) by mouth daily 30 tablet 2    glipiZIDE (GLUCOTROL XL) 10 MG 24 hr tablet Take 1 tablet (10 mg) by mouth daily 90 tablet 1    insulin glargine (LANTUS PEN) 100 UNIT/ML pen Inject 27 units subcutaneously 2  "times daily to lower blood sugar. Discard pens after they've been out of the fridge for 28 days. Keep at room temperature. 15 mL 1    insulin pen needle (32G X 4 MM) 32G X 4 MM miscellaneous Use 4-5 pen needles daily or as directed. 100 each 2    Nutritional Supplements (ENSURE ACTIVE) LIQD Take 414 mLs by mouth daily 30 mL 11    polyethylene glycol (MIRALAX) 17 GM/Dose powder Take 17 g by mouth 2 times daily 510 g 0    senna-docusate (SENOKOT-S/PERICOLACE) 8.6-50 MG tablet Take 1-2 tablets by mouth 2 times daily as needed for constipation 60 tablet 0          Review of Systems    The 10 point Review of Systems is negative other than noted in the HPI or here.     Social History   I have reviewed this patient's social history and updated it with pertinent information if needed.  Social History     Tobacco Use    Smoking status: Some Days     Current packs/day: 0.25     Average packs/day: 0.3 packs/day for 40.0 years (10.0 ttl pk-yrs)     Types: Cigarettes    Smokeless tobacco: Former   Substance Use Topics    Alcohol use: No     Alcohol/week: 0.0 standard drinks of alcohol     Comment: Last etoh in 2007    Drug use: Not Currently     Types: Marijuana, Methamphetamines         Allergies   Allergies   Allergen Reactions    Fentanyl Blisters     Per pt, after taking this medication had blisters develope    Tylenol [Acetaminophen] Itching    Dulaglutide Rash    Insulin Lispro Rash     Patient reported    Penicillin V Other (See Comments) and Rash     Diffuse maculopapular rash + feels \"high\", per pt.         Physical Exam   Vital Signs: Temp: 98.1  F (36.7  C) Temp src: Oral BP: (!) 141/92 Pulse: 73   Resp: 16 SpO2: 98 % O2 Device: None (Room air)    Weight: 0 lbs 0 ozNo intake or output data in the 24 hours ending 09/28/24 1304    General Appearance: Laying in bed, appears comfortable, NAD  Respiratory: NLB on RA  Cardiovascular: RRR on radial palpation  GI: well healed lap incisions, soft, NT, ND, no peritoneal " signs  Skin: warm, well perfused          Data     I have personally reviewed the following data over the past 24 hrs:    9.6  \   14.5   / 424     132 (L) 96 (L) 19.0 /  379 (H)   3.5 24 0.7 \     ALT: 18 AST: 17 AP: 133 TBILI: 0.7   ALB: 4.1 TOT PROTEIN: 7.8 LIPASE: 54       Imaging results reviewed over the past 24 hrs:   Recent Results (from the past 24 hour(s))   CT Abdomen Pelvis w Contrast    Narrative    EXAMINATION: CT ABDOMEN PELVIS W CONTRAST, 9/28/2024 11:33 AM    TECHNIQUE:  Helical CT images from the thoracic inlet through the  symphysis pubis were obtained  with contrast. Contrast dose: 92ml  Isovue 370    COMPARISON: CT abdomen 7/13/2024    HISTORY: abdl pain h/o SBO    FINDINGS:  Lower chest: Mild bibasilar atelectasis Liver: Within normal limits    Biliary System: Calcified gallstone within the gallbladder neck,  unchanged. No gallbladder wall thickening or pericholecystic  inflammatory changes.    Spleen: Within normal limits    Pancreas: Within normal limits    Adrenal glands: Within normal limits    Kidneys: Within normal limits    Gastrointestinal: Dilated gas and fluid-filled segment of small bowel  in the central abdomen with focal transition point visualized at the  right lower quadrant (series 3, image 54). Fecalized small bowel  contents just proximal to this point. Appendectomy. Distended post  prandial appearance of the stomach.    Peritoneum: No free air or fluid    Lymph nodes: No lymphadenopathy    Vasculature: Dense atherosclerotic calcifications at the superior  mesentery territory artery ostium.    Reproductive Organs: Penile prosthesis, with reservoir embedded  anterior to the left bladder.    Bladder: Within normal limits    Abdominal wall: Within normal limits    Musculoskeletal: Within normal limits      Impression    IMPRESSION:  1.  Dilated loops of small bowel with a transition point in the right  lower quadrant and associated fecalization of the small bowel  contents,  suggestive of delayed transit and likely partial small bowel  obstruction.  2.  Uncomplicated cholelithiasis, unchanged.    **Findings communicated to emergency medicine physician Dr. Juanita Portillo MD reviewed telephone conversation at 12:20 PM on 9/20/2024**    I have personally reviewed the examination and initial interpretation  and I agree with the findings.    JOSE VITAL DO         SYSTEM ID:  [id number]

## 2024-09-28 NOTE — H&P
"[facility]    History and Physical - Hospitalist Service, GOLD TEAM        Date of Admission:  9/28/2024    Assessment & Plan    Andrew Collier is a 58 year old male with a past medical history of GERD, HIV, DM2, and recurrent SBO who presents with abdominal pain and nausea.  He is now admitted to internal medicine for SBO.     Partial Small Bowel Obstruction - Patient with generalized abdominal pain, nausea, and emesis beginning today. Labs remarkable for hyponatremia. CT abdomen pelvis with \"Dilated loops of small bowel with a transition point in the right lower quadrant and associated fecalization of the small bowel contents, suggestive of delayed transit and likely partial small bowel obstruction.\" Symptoms likely 2/2 recurrent SBO.   -General surgery consult  -NPO  -NG to LIS   -mIVFs  -Dilaudid PRN    Hyponatremia - Sodium 132, corrected for hyperglycemia of 379 sodium 139.   -Continue mIVFs as above     HIV - Follows with Dr. Ivory, last visit 7/2024  -Hold PTA Biktarvy while NPO, resume when able     DM2 (Hgb A1c 11.7%) - PTA managed with Lantus 27units BID and glipizide 10mg daily.   -hold glipizide while NPO  -reduce Lantus dose to 10units BID   -add Q4H insulin sliding scale             Diet:  NPO  DVT Prophylaxis: Ambulate every shift  Potts Catheter: Not present  Lines: None     Cardiac Monitoring: None  Code Status:  Full code status    Clinically Significant Risk Factors Present on Admission         # Hyponatremia: Lowest Na = 132 mmol/L in last 2 days, will monitor as appropriate                 # DMII: A1C = 11.7 % (Ref range: <5.7 %) within past 6 months       # Financial/Environmental Concerns:           Disposition Plan     Medically Ready for Discharge: Anticipated in 2-4 Days         The patient's care was discussed with the Attending Physician, Dr. Seay .    Arabella Jovel PA-C  Hospitalist Service, GOLD TEAM   [facility] " [facility]  Securely message with [employer] (more info)  Text page via [employer] Paging/Directory   See signed in provider for up to date coverage information    ______________________________________________________________________    Chief Complaint   Abdominal pain     History is obtained from the patient and patient's chart     History of Present Illness   Andrew Collier is a 58 year old male with a PMH as listed above who presents with acute onset abdominal pain.  Patient notes generalized abdominal pain, nausea, and vomiting. He notes he is not passing gas and has not had a bowel movement since his symptoms began.  He notes associated weakness and fatigue.  He denies associated fevers, chills.        Past Medical History    Past Medical History:   Diagnosis Date    Acute appendicitis with localized peritonitis 1/31/2018    AIDS (H)     Allergic rhinitis due to other allergen     DNS    Anal dysplasia     Chronic abdominal pain     CNS toxoplasmosis (H)     COVID-19 1/11/2022    Diabetes type 2, controlled (H)     GERD (gastroesophageal reflux disease)     Herpes zoster 9/23/2016    History of seizure     History of substance abuse (H)     HIV (human immunodeficiency virus infection) (H)     HLD (hyperlipidemia)     Lung nodules     Periungual wart     Pneumonia 1/6/2019    PTSD (post-traumatic stress disorder)     Sleep apnea     doesn't use CPAP       Past Surgical History   Past Surgical History:   Procedure Laterality Date    ANOSCOPY N/A 9/9/2020    Procedure: Exam Under Anesthesia, ANOSCOPY, fulgeration of rectal fissures with Rectal Biopsies;  Surgeon: Thanh Lundberg MD;  [facility]    COLONOSCOPY Left 1/22/2016    Procedure: COMBINED COLONOSCOPY, SINGLE OR MULTIPLE BIOPSY/POLYPECTOMY BY BIOPSY;  Surgeon: Clark Saini MD;  [facility]    ESOPHAGOSCOPY, GASTROSCOPY, DUODENOSCOPY (EGD), COMBINED N/A 6/6/2022    Procedure: ESOPHAGOGASTRODUODENOSCOPY, WITH  BIOPSY;  Surgeon: Kecia Benítez MD;  Location: [facility]    HC EXPLORE UNDESC TESTIS,INGUIN/SCROTAL      LAPAROSCOPIC APPENDECTOMY N/A 2018    Procedure: LAPAROSCOPIC APPENDECTOMY;  LAPAROSCOPIC APPENDECTOMY;  Surgeon: Dawn Holt MD;  [facility]    LAPAROSCOPY DIAGNOSTIC (GENERAL) N/A 2016    Procedure: LAPAROSCOPY DIAGNOSTIC (GENERAL);  Surgeon: Susannah Arriaga MD;  [facility]    LAPAROSCOPY DIAGNOSTIC (GENERAL) N/A 2018    Procedure: LAPAROSCOPY DIAGNOSTIC (GENERAL);  Diagnostic laparoscopy and lysis of adhesions;  Surgeon: Prince Dowling MD;  [facility]    OPTICAL TRACKING SYSTEM CRANIOTOMY, EXCISE TUMOR, COMBINED Left 4/10/2015    Procedure: COMBINED OPTICAL TRACKING SYSTEM CRANIOTOMY, EXCISE TUMOR;  Surgeon: Mirlande Colmenares MD;  [facility]    REPAIR GAMEKEEPER'S THUMB Right 2016    Procedure: REPAIR LIGAMENT ULNAR COLLATERAL THUMB (GAMEKEEPER'S);  Surgeon: Evin Zamorano MD;  [facility]    ZZC NONSPECIFIC PROCEDURE      right forearm fracture       Prior to Admission Medications   Prior to Admission Medications   Prescriptions Last Dose Informant Patient Reported? Taking?   Continuous Glucose Sensor (DEXCOM G6 SENSOR) MISC   No No   Si each every 10 days Change every 10 days.   Continuous Glucose Sensor (DEXCOM G7 SENSOR) MISC   No No   Si Device every 10 days.   Continuous Glucose Transmitter (DEXCOM G6 TRANSMITTER) MISC   No No   Si each every 3 months Change every 3 months.   Nutritional Supplements (ENSURE ACTIVE) LIQD   No No   Sig: Take 414 mLs by mouth daily   bictegravir-emtricitabine-tenofovir (BIKTARVY) -25 MG per tablet   No No   Sig: Take 1 tablet by mouth daily   blood glucose (NO BRAND SPECIFIED) lancets standard   No No   Sig: Use to test blood sugar 1 time daily or as directed.   blood glucose (NO BRAND SPECIFIED) test strip   No No   Sig: Use to test blood sugar 1 time daily or as directed.    blood glucose monitoring (NO BRAND SPECIFIED) meter device kit   No No   Sig: Use to test blood sugar 3 times daily or as directed.   famotidine (PEPCID) 40 MG tablet   No No   Sig: Take 1 tablet (40 mg) by mouth daily   glipiZIDE (GLUCOTROL XL) 10 MG 24 hr tablet   No No   Sig: Take 1 tablet (10 mg) by mouth daily   insulin glargine (LANTUS PEN) 100 UNIT/ML pen   No No   Sig: Inject 27 units subcutaneously 2 times daily to lower blood sugar. Discard pens after they've been out of the fridge for 28 days. Keep at room temperature.   insulin pen needle (32G X 4 MM) 32G X 4 MM miscellaneous   No No   Sig: Use 4-5 pen needles daily or as directed.   polyethylene glycol (MIRALAX) 17 GM/Dose powder   No No   Sig: Take 17 g by mouth 2 times daily   senna-docusate (SENOKOT-S/PERICOLACE) 8.6-50 MG tablet   No No   Sig: Take 1-2 tablets by mouth 2 times daily as needed for constipation      Facility-Administered Medications: None        Physical Exam   Vital Signs: Temp: 98.1  F (36.7  C) Temp src: Oral BP: (!) 141/92 Pulse: 73   Resp: 16 SpO2: 98 % O2 Device: None (Room air)    Weight: 0 lbs 0 oz    GENERAL: Alert and oriented x 3. NAD.    HEENT: Anicteric sclera. Mucous membranes moist. NC. AT.   CV: RRR. S1, S2. No murmurs appreciated.   RESPIRATORY: Effort normal on RA. Lungs CTAB with no wheezing, rales, rhonchi.   GI: Abdomen soft and non distended with hypo-active bowel sounds present in all quadrants. Diffusely TTP   NEUROLOGICAL: No focal deficits. Moves all extremities.    EXTREMITIES: No peripheral edema.   SKIN: No jaundice. No rashes.          Medical Decision Making       75 MINUTES SPENT BY ME on the date of service doing chart review, history, exam, documentation & further activities per the note.      Data   Recent Labs   Lab 09/28/24  1252 09/28/24  0938   WBC  --  9.6   HGB  --  14.5   MCV  --  86   PLT  --  424   NA  --  132*   POTASSIUM  --  3.5   CHLORIDE  --  96*   CO2  --  24   BUN  --  19.0   CR 0.7  0.72   ANIONGAP  --  12   LINDA  --  9.6   GLC  --  379*   ALBUMIN  --  4.1   PROTTOTAL  --  7.8   BILITOTAL  --  0.7   ALKPHOS  --  133   ALT  --  18   AST  --  17   LIPASE  --  54

## 2024-09-29 LAB
ALBUMIN SERPL BCG-MCNC: 3.3 G/DL (ref 3.5–5.2)
ALP SERPL-CCNC: 94 U/L (ref 40–150)
ALT SERPL W P-5'-P-CCNC: 11 U/L (ref 0–70)
ANION GAP SERPL CALCULATED.3IONS-SCNC: 10 MMOL/L (ref 7–15)
AST SERPL W P-5'-P-CCNC: 13 U/L (ref 0–45)
BILIRUB SERPL-MCNC: 0.5 MG/DL
BUN SERPL-MCNC: 10.9 MG/DL (ref 8–23)
CALCIUM SERPL-MCNC: 8.5 MG/DL (ref 8.8–10.4)
CHLORIDE SERPL-SCNC: 101 MMOL/L (ref 98–107)
CREAT SERPL-MCNC: 0.61 MG/DL (ref 0.67–1.17)
EGFRCR SERPLBLD CKD-EPI 2021: >90 ML/MIN/1.73M2
ERYTHROCYTE [DISTWIDTH] IN BLOOD BY AUTOMATED COUNT: 12.8 % (ref 10–15)
GLUCOSE BLDC GLUCOMTR-MCNC: 229 MG/DL (ref 70–99)
GLUCOSE BLDC GLUCOMTR-MCNC: 276 MG/DL (ref 70–99)
GLUCOSE BLDC GLUCOMTR-MCNC: 341 MG/DL (ref 70–99)
GLUCOSE BLDC GLUCOMTR-MCNC: 392 MG/DL (ref 70–99)
GLUCOSE BLDC GLUCOMTR-MCNC: 418 MG/DL (ref 70–99)
GLUCOSE BLDC GLUCOMTR-MCNC: 420 MG/DL (ref 70–99)
GLUCOSE SERPL-MCNC: 262 MG/DL (ref 70–99)
HCO3 SERPL-SCNC: 22 MMOL/L (ref 22–29)
HCT VFR BLD AUTO: 38.1 % (ref 40–53)
HGB BLD-MCNC: 12.8 G/DL (ref 13.3–17.7)
MCH RBC QN AUTO: 29.7 PG (ref 26.5–33)
MCHC RBC AUTO-ENTMCNC: 33.6 G/DL (ref 31.5–36.5)
MCV RBC AUTO: 88 FL (ref 78–100)
PLATELET # BLD AUTO: 342 10E3/UL (ref 150–450)
POTASSIUM SERPL-SCNC: 3.8 MMOL/L (ref 3.4–5.3)
PROT SERPL-MCNC: 6.3 G/DL (ref 6.4–8.3)
RBC # BLD AUTO: 4.31 10E6/UL (ref 4.4–5.9)
SODIUM SERPL-SCNC: 133 MMOL/L (ref 135–145)
WBC # BLD AUTO: 6.1 10E3/UL (ref 4–11)

## 2024-09-29 PROCEDURE — 36415 COLL VENOUS BLD VENIPUNCTURE: CPT | Performed by: PHYSICIAN ASSISTANT

## 2024-09-29 PROCEDURE — 250N000013 HC RX MED GY IP 250 OP 250 PS 637: Performed by: PHYSICIAN ASSISTANT

## 2024-09-29 PROCEDURE — 99233 SBSQ HOSP IP/OBS HIGH 50: CPT | Mod: FS | Performed by: PHYSICIAN ASSISTANT

## 2024-09-29 PROCEDURE — 255N000002 HC RX 255 OP 636: Performed by: STUDENT IN AN ORGANIZED HEALTH CARE EDUCATION/TRAINING PROGRAM

## 2024-09-29 PROCEDURE — 250N000013 HC RX MED GY IP 250 OP 250 PS 637: Performed by: STUDENT IN AN ORGANIZED HEALTH CARE EDUCATION/TRAINING PROGRAM

## 2024-09-29 PROCEDURE — 99418 PROLNG IP/OBS E/M EA 15 MIN: CPT | Mod: FS | Performed by: PHYSICIAN ASSISTANT

## 2024-09-29 PROCEDURE — 999N000157 HC STATISTIC RCP TIME EA 10 MIN

## 2024-09-29 PROCEDURE — 250N000009 HC RX 250: Performed by: STUDENT IN AN ORGANIZED HEALTH CARE EDUCATION/TRAINING PROGRAM

## 2024-09-29 PROCEDURE — 80053 COMPREHEN METABOLIC PANEL: CPT | Performed by: PHYSICIAN ASSISTANT

## 2024-09-29 PROCEDURE — 99207 PR APP CREDIT; MD BILLING SHARED VISIT: CPT | Mod: FS | Performed by: STUDENT IN AN ORGANIZED HEALTH CARE EDUCATION/TRAINING PROGRAM

## 2024-09-29 PROCEDURE — 85014 HEMATOCRIT: CPT | Performed by: PHYSICIAN ASSISTANT

## 2024-09-29 PROCEDURE — 120N000002 HC R&B MED SURG/OB UMMC

## 2024-09-29 RX ORDER — NALOXONE HYDROCHLORIDE 0.4 MG/ML
0.2 INJECTION, SOLUTION INTRAMUSCULAR; INTRAVENOUS; SUBCUTANEOUS
Status: DISCONTINUED | OUTPATIENT
Start: 2024-09-29 | End: 2024-10-01 | Stop reason: HOSPADM

## 2024-09-29 RX ORDER — HYDROMORPHONE HYDROCHLORIDE 1 MG/ML
0.5 INJECTION, SOLUTION INTRAMUSCULAR; INTRAVENOUS; SUBCUTANEOUS
Status: DISCONTINUED | OUTPATIENT
Start: 2024-09-29 | End: 2024-10-01

## 2024-09-29 RX ORDER — FAMOTIDINE 20 MG/1
40 TABLET, FILM COATED ORAL DAILY
Status: DISCONTINUED | OUTPATIENT
Start: 2024-09-29 | End: 2024-10-01 | Stop reason: HOSPADM

## 2024-09-29 RX ORDER — NALOXONE HYDROCHLORIDE 0.4 MG/ML
0.4 INJECTION, SOLUTION INTRAMUSCULAR; INTRAVENOUS; SUBCUTANEOUS
Status: DISCONTINUED | OUTPATIENT
Start: 2024-09-29 | End: 2024-10-01 | Stop reason: HOSPADM

## 2024-09-29 RX ADMIN — INSULIN ASPART 6 UNITS: 100 INJECTION, SOLUTION INTRAVENOUS; SUBCUTANEOUS at 18:45

## 2024-09-29 RX ADMIN — BICTEGRAVIR SODIUM, EMTRICITABINE, AND TENOFOVIR ALAFENAMIDE FUMARATE 1 TABLET: 50; 200; 25 TABLET ORAL at 12:58

## 2024-09-29 RX ADMIN — FAMOTIDINE 40 MG: 20 TABLET ORAL at 12:58

## 2024-09-29 RX ADMIN — INSULIN ASPART 3 UNITS: 100 INJECTION, SOLUTION INTRAVENOUS; SUBCUTANEOUS at 10:30

## 2024-09-29 RX ADMIN — INSULIN ASPART 6 UNITS: 100 INJECTION, SOLUTION INTRAVENOUS; SUBCUTANEOUS at 14:37

## 2024-09-29 RX ADMIN — INSULIN GLARGINE 10 UNITS: 100 INJECTION, SOLUTION SUBCUTANEOUS at 08:21

## 2024-09-29 RX ADMIN — INSULIN ASPART 5 UNITS: 100 INJECTION, SOLUTION INTRAVENOUS; SUBCUTANEOUS at 22:23

## 2024-09-29 RX ADMIN — WATER 150 ML: 100 IRRIGANT IRRIGATION at 16:15

## 2024-09-29 ASSESSMENT — ACTIVITIES OF DAILY LIVING (ADL)
ADLS_ACUITY_SCORE: 37

## 2024-09-29 NOTE — PROGRESS NOTES
Surgery Progress Note  09/29/2024       Subjective:  - Patient awake at bedside, states he has been tolerating ice chips without NVBH, voiding well, passed significant gas and some stool, ambulating, pain well controlled.     Objective:  Temp:  [97.7  F (36.5  C)-98.7  F (37.1  C)] 98.7  F (37.1  C)  Pulse:  [78-94] 94  Resp:  [15-16] 16  BP: (120-152)/(69-92) 120/69  SpO2:  [98 %-100 %] 99 %    I/O last 3 completed shifts:  In: 961.25 [P.O.:30; I.V.:931.25]  Out: -       Gen: Awake, alert, NAD  Resp: NLB on RA  Abd: soft, nondistended, nontender  Ext: WWP, no edema     Labs:  Recent Labs   Lab 09/29/24  0632 09/28/24  0938   WBC 6.1 9.6   HGB 12.8* 14.5    424       Recent Labs   Lab 09/29/24  1006 09/29/24  0632 09/29/24  0201 09/28/24  1412 09/28/24  1252 09/28/24  0938   NA  --  133*  --   --   --  132*   POTASSIUM  --  3.8  --   --   --  3.5   CHLORIDE  --  101  --   --   --  96*   CO2  --  22  --   --   --  24   BUN  --  10.9  --   --   --  19.0   CR  --  0.61*  --   --  0.7 0.72   * 262* 229*   < >  --  379*   LINDA  --  8.5*  --   --   --  9.6    < > = values in this interval not displayed.       Imaging:  Reviewed     Assessment/Plan:   58 year old male with a history SBO's. HIV, T2DM, Polysubstance use, CNS Toxoplasmosis admitted for SBO that was found to be partially obstructed on CT.    Patient's obstructive symptoms have resolved and we recommend the following   -Able to advance diet as tolerated.   -Start Biktarvy and other PTA medications   -Gastrograffin challenge:   -Medical management per primary   -EGS will continue to follow     Seen, examined, and discussed with chief resident, who will discuss with staff.  - - - - - - - - - - - - - - - - - -  Koby Garrido MD  General Surgery, PGY-1  [facility]

## 2024-09-29 NOTE — PLAN OF CARE
Goal Outcome Evaluation:      Plan of Care Reviewed With: patient    Overall Patient Progress: no changeOverall Patient Progress: no change     Pt with this RN from approx 1530 to 1930    Status: Suspected SBO based on history, symptoms, and CT scan completed today in ED.     Arrived at  at approx 1530, upon arrival was oriented to the room, VS were obtained, questions from pt were answered, and pt was given time to get comfortable before initial assessment.     VS: Stable on RA  Pain: identifies 7/10 abdominal pain, did not request prn despite being made aware they were available.   Neuro: A&Ox4  Cardiac: WDL  Respiratory: WDL on RA  : Voids spont, uses bathroom by self  GI: Abd pain present, constipation present, feeling of fullness of abdomen present. Nausea intermittent at low levels per pt, denied anti-emetic.  Diet: NPO  Activity: Up ad lexi, 5/5 strengths  Skin: Pt refused skin assessment, visible skin noted no issues.   LDA's: Pt refused insertion of ordered NG tube after 3 attempts to place in ED. Refused placement again twice upon arrival to  despite education on indications from this RN and the charge nurse.     New this shift: BG high during shift, insulin aspart pen not found on unit, was not able to give 1800 dose of insulin on time. Pt will receive glargine from oncoming nurse at 2000, and at time of BG was asymptomatic of s/s of hyperglycemia; passed task of locating aspart pen to oncoming nurse. Pt refused 2 RN skin assessment after arriving to unit, education completed, pt still refused. Passed on to oncoming nurse to attempt again if pt agreeable. Pt rested most of shift.

## 2024-09-29 NOTE — PLAN OF CARE
Goal Outcome Evaluation:      Plan of Care Reviewed With: patient    Overall Patient Progress: improvingOverall Patient Progress: improving    Outcome Evaluation: steady progress this shift    Temp: 98.6  F (37  C) Temp src: Oral BP: 139/87 Pulse: 83   Resp: 16 SpO2: 100 % O2 Device: None (Room air)       Time: 1900 - 0700  Reason for admission:  Generalized abdominal pain, possible SBO  Vitals:  Stable on RA  Diet:  NPO  Activity/Mobility:  UAL  Neuro:  A&Ox4  Pain/Nausea:  Pain denied. Denies nausea.   Respiratory: WDL on RA  Cardiac: WDL  GI:  Passing gas. No BM this shift.  :  Voiding spontaneously, not saving  Lines & Drains:  L PIV SL  Labs:  Monitored.   Incisions/Skin:  no new deficits    NEW CHANGES:  No acute changes this shift. Pt slept through most of the 12 hours, consistently denying pain and nausea. Pt refused Novolog around concerns of being NPO. Lantus administered as ordered.     Continue to implement Care Plan.    Aguila Simmons RN

## 2024-09-29 NOTE — PROGRESS NOTES
"[facility]    Medicine Progress Note - Hospitalist Service, GOLD TEAM 4    Date of Admission:  9/28/2024    Assessment & Plan   Andrew Collier is a 58 year old male with a past medical history of GERD, HIV, T2DM, CNS Toxoplasmosis, and recurrent SBOs who was admitted after presenting to the ED for evaluation of abdominal pain and nausea. He was found to have a SBO and admitted for further monitoring and management.    Partial Small Bowel Obstruction  Hx of Recurrent SBOs  Presented initially w/ abd pain, nausea, and emesis which began on the day of admission. Labs at that time showed mild pseudohyponatremia (see below). CT A/P showed \"dilated loops of small bowel w/ transition point seen in the RLQ w/ associated fecalization of small bowel contents, suggestive of delayed transit and likely partial small bowel obstruction\". He was admitted for pain control, IVF, and for Gen Surge evaluation as below. Today, remains HDS, and symptoms largely improving, though does remain w/ ~7/10 abdominal pain.  - General Surgery involved, appreciate assistance   - Gastrograffin challenge 9/29 showed no e/o obstruction   - ADAT beginning w/ CLD. Pt notes drinking well today, and beginning to eat \"a little\"   - Resumed PTA PO meds 9/29  - Now passing gas  - Continue to monitor and notify if worsening abd pain, recurrent nausea, or fevers    Pseudohyponatremia, improving  Na 132 on arrival, but corrected Na w/  was 139. Today, Na 133. Remainder of lytes and renal function WNL.  - mIVF as above  - Trend lytes in AM  - ADAT as above    HIV-1  Follows w/ Dr. Ivory in ID clinic, last visit 07/2024. Last HIV RNA quant at that time showed , and absolute CD4 467. PTA Biktarvy, was held on admission, but now transitioning back to PO meds.  - Resume Biktarvy as above  - Follow-up w/ ID as directed    Type 2 Diabetes Mellitus, insulin-dependent, poorly controlled  Last A1c was " "11.7% on 7/13. PTA Lantus 27 units BID and Glipizide 10mg daily. BG largely above goal here even while NPO. Lantus reduced on admission given NPO status to 10 units BID. BG range last 24 hours 140-418.  - Transition back PTA Glipizide 10mg in AM  - Will escalate Lantus 14 units BID --> 17 units BID today  - BG checks TID AC + at bedtime + 0200  - Hypoglycemia protocol          Diet: Regular Diet Adult    DVT Prophylaxis: Pneumatic Compression Devices  Potts Catheter: Not present  Lines: None     Cardiac Monitoring: None  Code Status: Full Code      Clinically Significant Risk Factors         # Hyponatremia: Lowest Na = 133 mmol/L in last 2 days, will monitor as appropriate  # Hypocalcemia: Lowest Ca = 8.5 mg/dL in last 2 days, will monitor and replace as appropriate     # Hypoalbuminemia: Lowest albumin = 3.3 g/dL at 9/29/2024  6:32 AM, will monitor as appropriate              # DMII: A1C = 11.7 % (Ref range: <5.7 %) within past 6 months         # Financial/Environmental Concerns:           Disposition Plan     Medically Ready for Discharge: Anticipated Tomorrow           The patient's care was discussed with the Attending Physician, Dr. Kathy Titus, Bedside Nurse, and Patient.    Marcial Brian PA-C  Hospitalist Service, GOLD TEAM [address] [place] [address]  Securely message with [employer] (more info)  Text page via [facility] Paging/Directory   See signed in provider for up to date coverage information  ______________________________________________________________________    Interval History   Pt seen in his room this AM, feels \"ok\". Notes ongoing abdominal pain diffusely, rates ~7/10 in severity, but is able to tolerate fluid without significant issues. Has yet to eat any solid foods. Denies nausea or vomiting overnight or this AM and is now passing gas, no stool yet. Otherwise denies chest pain, dyspnea, headaches, vision changes, lower extremity pain or swelling, urinary " changes.    Physical Exam   Vital Signs: Temp: 98.5  F (36.9  C) Temp src: Oral BP: 124/85 Pulse: 80   Resp: 16 SpO2: 100 % O2 Device: None (Room air)    Weight: 0 lbs 0 oz    Constitutional: awake, alert, cooperative, no apparent distress, and appears stated age. Lying supine in bed.  Eyes: lids and lashes normal, sclera clear, and conjunctiva normal  ENT: normocephalic, without obvious abnormality. Poor dentition noted.  Respiratory: No increased work of breathing, good air exchange, clear to auscultation bilaterally, no crackles or wheezing  Cardiovascular: regular rate and rhythm, normal S1 and S2, no S3, no S4, and no murmur noted  GI: normal bowel sounds, soft, non-distended, and tenderness noted diffusely, without organomegaly or any focal areas of tenderness.  Skin: no bruising or bleeding, no redness, warmth, or swelling, no rashes, and no lesions on visualized skin. Isolated, healing scabs noted on BLEs in scattered areas.   Musculoskeletal: no lower extremity pitting edema present, no deformities, no calf tenderness.  Neurologic: Moving all extremities equally and spontaneously. No obvious focal neuro deficits.  Neuropsychiatric: General: normal, calm, and normal eye contact  Level of consciousness: alert / normal  Affect: normal  Memory and insight: normal, memory for past and recent events intact, and thought process normal    Medical Decision Making       45 MINUTES SPENT BY ME on the date of service doing chart review, history, exam, documentation & further activities per the note.      Data     I have personally reviewed the following data over the past 24 hrs:    N/A  \   N/A   / N/A     136 104 10.3 /  299 (H)   3.8 24 0.70 \       Imaging results reviewed over the past 24 hrs:   Recent Results (from the past 24 hour(s))   XR Gastrografin Challenge    Narrative    EXAMINATION:  XR GASTROGRAFIN CHALLENGE 9/30/2024 12:31 AM.    COMPARISON: CT 9/28/2024, radiograph 7/15/2024.    HISTORY:  Small bowel  obstruction    FINDINGS:   Portable supine view of the abdomen obtained 8 hours post oral intake  of Gastrografin. Contrast present throughout distended loops of small  bowel as well as throughout the colon to the level of the rectum,  excludes a high-grade small bowel obstruction. No pneumatosis or  portal venous gas. No focal consolidation in the visualized lung  bases. No acute osseous abnormality.      Impression    IMPRESSION:   Contrast present throughout the colon to the level of the rectum  excludes a high-grade small bowel obstruction.    I have personally reviewed the examination and initial interpretation  and I agree with the findings.    ASH KAY DO         SYSTEM ID:  [id number]     Recent Labs   Lab 09/30/24  1338 09/30/24  1227 09/30/24  0935 09/29/24  1006 09/29/24  0632 09/28/24  1412 09/28/24  1252 09/28/24  0938   WBC  --   --   --   --  6.1  --   --  9.6   HGB  --   --   --   --  12.8*  --   --  14.5   MCV  --   --   --   --  88  --   --  86   PLT  --   --   --   --  342  --   --  424   NA  --  136  --   --  133*  --   --  132*   POTASSIUM  --  3.8  --   --  3.8  --   --  3.5   CHLORIDE  --  104  --   --  101  --   --  96*   CO2  --  24  --   --  22  --   --  24   BUN  --  10.3  --   --  10.9  --   --  19.0   CR  --  0.70  --   --  0.61*  --  0.7 0.72   ANIONGAP  --  8  --   --  10  --   --  12   LINDA  --  9.1  --   --  8.5*  --   --  9.6   * 173* 140*   < > 262*   < >  --  379*   ALBUMIN  --   --   --   --  3.3*  --   --  4.1   PROTTOTAL  --   --   --   --  6.3*  --   --  7.8   BILITOTAL  --   --   --   --  0.5  --   --  0.7   ALKPHOS  --   --   --   --  94  --   --  133   ALT  --   --   --   --  11  --   --  18   AST  --   --   --   --  13  --   --  17   LIPASE  --   --   --   --   --   --   --  54    < > = values in this interval not displayed.

## 2024-09-29 NOTE — PLAN OF CARE
/86 (BP Location: Left arm)   Pulse 94   Temp 97.8  F (36.6  C) (Oral)   Resp 16   SpO2 100%     [id number] shift September 29, 2024     Up ad lexi, independent in room. A&Ox4, lethargic. Gastrographin administered at 4:15 pm. Abd XRAY scheduled for 12:20pm. Stable on room air. Denies SOB/cough. CLD. Snacking outside of recommendations. Re-educated on glycemic control and diet as ordered per team. L PIV s/l. Denies pain, denies nausea. Passing gas.

## 2024-09-29 NOTE — PROGRESS NOTES
"[facility]    Medicine Progress Note - Hospitalist Service, GOLD TEAM 4    Date of Admission:  9/28/2024    Assessment & Plan   Andrew Collier is a 58 year old male with a past medical history of GERD, HIV, DM2, and recurrent SBO who presents with abdominal pain and nausea.  He is now admitted to internal medicine for SBO.      # Partial SBO  # Hx recurrent SBO  Presented with 1 day of generalized abdominal pain, nausea, and emesis. CT abdomen pelvis with \"Dilated loops of small bowel with a transition point in the right lower quadrant and associated fecalization of the small bowel contents, suggestive of delayed transit and likely partial small bowel obstruction.\" Symptoms likely 2/2 recurrent SBO.   -General surgery consult  -Gastrografin study today  -advance to clears today  -patient refused NGT placement  -stop MIVF  -resume po medications as below  -start oxycodone, continue IV Dilaudid as second line     # HIV   Follows with Dr. Ivory, last visit 7/2024  - Resume PTA Biktarvy     # DM2 (Hgb A1c 11.7%)   PTA managed with Lantus 27units BID and glipizide 10mg daily.   - hold glipizide for now  - Increase Lantus to 14 units BID   - add Q4H insulin sliding scale     # Pseudohyponatremia  Sodium 132, corrected for hyperglycemia of 379 sodium 139.           Diet: Clear Liquid Diet    DVT Prophylaxis: Pneumatic Compression Devices  Potts Catheter: Not present  Lines: None     Cardiac Monitoring: None  Code Status: Full Code      Clinically Significant Risk Factors         # Hyponatremia: Lowest Na = 132 mmol/L in last 2 days, will monitor as appropriate  # Hypocalcemia: Lowest Ca = 8.5 mg/dL in last 2 days, will monitor and replace as appropriate     # Hypoalbuminemia: Lowest albumin = 3.3 g/dL at 9/29/2024  6:32 AM, will monitor as appropriate              # DMII: A1C = 11.7 % (Ref range: <5.7 %) within past 6 months       # Financial/Environmental Concerns:  "          Disposition Plan     Medically Ready for Discharge: Anticipated in 2-4 Days           The patient's care was discussed with the Attending Physician, Dr. Titus, Bedside Nurse, Patient, and general surgery Team.    Tyra Lazo PA-C  Hospitalist Service, GOLD TEAM [address] [place] [address]  Securely message with [employer] (more info)  Text page via [facility] Paging/Directory   See signed in provider for up to date coverage information  ______________________________________________________________________    Interval History   Patient was seen at bedside. He reports resolution of abdominal pain. No nausea or vomiting. Has started passing flatus, no BM.     Physical Exam   Vital Signs: Temp: 98.7  F (37.1  C) Temp src: Oral BP: 120/69 Pulse: 94   Resp: 16 SpO2: 99 % O2 Device: None (Room air)    Weight: 0 lbs 0 oz    GENERAL: adult male seen resting comfortably reclined in bed. NAD.   NEURO / PSYCH: Alert, converses appropriately. No focal deficits. Moves all extremities.   HEENT: Anicteric sclera.   CV: RRR. S1, S2. No murmurs appreciated.  2+ left radial pulse.  RESPIRATORY: Effort normal. Lungs CTAB with no wheezing, rales, rhonchi.   GI: Abdomen soft and non distended with bowel sounds present. No tenderness or guarding to palpation.   MSK: no gross deformities  EXTREMITIES: nonedematous  SKIN: No jaundice. No rashes or lesions to exposed areas.       Medical Decision Making       45 MINUTES SPENT BY ME on the date of service doing chart review, history, exam, documentation & further activities per the note.      Data     I have personally reviewed the following data over the past 24 hrs:    6.1  \   12.8 (L)   / 342     133 (L) 101 10.9 /  418 (H)   3.8 22 0.61 (L) \     ALT: 11 AST: 13 AP: 94 TBILI: 0.5   ALB: 3.3 (L) TOT PROTEIN: 6.3 (L) LIPASE: N/A

## 2024-09-29 NOTE — PROGRESS NOTES
"/69 (BP Location: Left arm)   Pulse 94   Temp 98.7  F (37.1  C) (Oral)   Resp 16   SpO2 99%     Cares from: 0700 - 1530    VS & Pain: VSS on RA. Denies pain   Neuro: AxO x4  Respiratory: denied shortness of breath   Cardiac: denied chest pain   Peripheral neurovascular: wdl   GI/: + bowel sounds. No BM this shift. Voids spontaneously   Nutrition: clear, tolerating. Denied nausea & vomiting   Skin: no new concerns noted   Musculoskeletal: wdl   Lines: R. PIV- SL  Activity: up ad lexi   Events this shift: pt refused gastrografin solution, stated \"later.\" Order adjusted to be given later at 1530. Pt BG was 418. Pt was not following diet order. He had bag full of snacks (esau crackers & vanilla pudding). Provider was notified. Pt was educated about importance of being adherent to diet orders. Pt verbalized understanding     Plan: continue plan of care    "

## 2024-09-30 ENCOUNTER — APPOINTMENT (OUTPATIENT)
Dept: GENERAL RADIOLOGY | Facility: CLINIC | Age: 59
End: 2024-09-30
Payer: COMMERCIAL

## 2024-09-30 VITALS
HEART RATE: 94 BPM | DIASTOLIC BLOOD PRESSURE: 87 MMHG | SYSTOLIC BLOOD PRESSURE: 129 MMHG | TEMPERATURE: 97.4 F | RESPIRATION RATE: 16 BRPM | OXYGEN SATURATION: 100 %

## 2024-09-30 LAB
ANION GAP SERPL CALCULATED.3IONS-SCNC: 8 MMOL/L (ref 7–15)
BUN SERPL-MCNC: 10.3 MG/DL (ref 8–23)
CALCIUM SERPL-MCNC: 9.1 MG/DL (ref 8.8–10.4)
CHLORIDE SERPL-SCNC: 104 MMOL/L (ref 98–107)
CREAT SERPL-MCNC: 0.7 MG/DL (ref 0.67–1.17)
EGFRCR SERPLBLD CKD-EPI 2021: >90 ML/MIN/1.73M2
GLUCOSE BLDC GLUCOMTR-MCNC: 140 MG/DL (ref 70–99)
GLUCOSE BLDC GLUCOMTR-MCNC: 143 MG/DL (ref 70–99)
GLUCOSE BLDC GLUCOMTR-MCNC: 299 MG/DL (ref 70–99)
GLUCOSE BLDC GLUCOMTR-MCNC: 333 MG/DL (ref 70–99)
GLUCOSE BLDC GLUCOMTR-MCNC: 408 MG/DL (ref 70–99)
GLUCOSE SERPL-MCNC: 173 MG/DL (ref 70–99)
HCO3 SERPL-SCNC: 24 MMOL/L (ref 22–29)
POTASSIUM SERPL-SCNC: 3.8 MMOL/L (ref 3.4–5.3)
SODIUM SERPL-SCNC: 136 MMOL/L (ref 135–145)

## 2024-09-30 PROCEDURE — 99233 SBSQ HOSP IP/OBS HIGH 50: CPT | Mod: FS

## 2024-09-30 PROCEDURE — 74018 RADEX ABDOMEN 1 VIEW: CPT

## 2024-09-30 PROCEDURE — 99418 PROLNG IP/OBS E/M EA 15 MIN: CPT | Mod: FS

## 2024-09-30 PROCEDURE — 36415 COLL VENOUS BLD VENIPUNCTURE: CPT | Performed by: PHYSICIAN ASSISTANT

## 2024-09-30 PROCEDURE — 80048 BASIC METABOLIC PNL TOTAL CA: CPT | Performed by: PHYSICIAN ASSISTANT

## 2024-09-30 PROCEDURE — 250N000013 HC RX MED GY IP 250 OP 250 PS 637: Performed by: PHYSICIAN ASSISTANT

## 2024-09-30 PROCEDURE — 250N000013 HC RX MED GY IP 250 OP 250 PS 637: Performed by: STUDENT IN AN ORGANIZED HEALTH CARE EDUCATION/TRAINING PROGRAM

## 2024-09-30 PROCEDURE — 99207 PR APP CREDIT; MD BILLING SHARED VISIT: CPT | Mod: FS | Performed by: STUDENT IN AN ORGANIZED HEALTH CARE EDUCATION/TRAINING PROGRAM

## 2024-09-30 PROCEDURE — 74018 RADEX ABDOMEN 1 VIEW: CPT | Mod: 26 | Performed by: STUDENT IN AN ORGANIZED HEALTH CARE EDUCATION/TRAINING PROGRAM

## 2024-09-30 PROCEDURE — 99231 SBSQ HOSP IP/OBS SF/LOW 25: CPT | Mod: GC | Performed by: SURGERY

## 2024-09-30 PROCEDURE — 120N000002 HC R&B MED SURG/OB UMMC

## 2024-09-30 RX ORDER — CALCIUM CARBONATE 500 MG/1
500 TABLET, CHEWABLE ORAL EVERY 6 HOURS PRN
Status: DISCONTINUED | OUTPATIENT
Start: 2024-09-30 | End: 2024-10-01 | Stop reason: HOSPADM

## 2024-09-30 RX ORDER — GLIPIZIDE 10 MG/1
10 TABLET, FILM COATED, EXTENDED RELEASE ORAL
Status: DISCONTINUED | OUTPATIENT
Start: 2024-10-01 | End: 2024-10-01 | Stop reason: HOSPADM

## 2024-09-30 RX ADMIN — INSULIN ASPART 1 UNITS: 100 INJECTION, SOLUTION INTRAVENOUS; SUBCUTANEOUS at 05:42

## 2024-09-30 RX ADMIN — INSULIN ASPART 6 UNITS: 100 INJECTION, SOLUTION INTRAVENOUS; SUBCUTANEOUS at 22:42

## 2024-09-30 RX ADMIN — BICTEGRAVIR SODIUM, EMTRICITABINE, AND TENOFOVIR ALAFENAMIDE FUMARATE 1 TABLET: 50; 200; 25 TABLET ORAL at 09:33

## 2024-09-30 RX ADMIN — FAMOTIDINE 40 MG: 20 TABLET ORAL at 09:34

## 2024-09-30 RX ADMIN — INSULIN ASPART 4 UNITS: 100 INJECTION, SOLUTION INTRAVENOUS; SUBCUTANEOUS at 18:34

## 2024-09-30 RX ADMIN — INSULIN ASPART 4 UNITS: 100 INJECTION, SOLUTION INTRAVENOUS; SUBCUTANEOUS at 13:55

## 2024-09-30 ASSESSMENT — ACTIVITIES OF DAILY LIVING (ADL)
ADLS_ACUITY_SCORE: 37

## 2024-09-30 NOTE — PLAN OF CARE
"Goal Outcome Evaluation:      Plan of Care Reviewed With: patient    Overall Patient Progress: improvingOverall Patient Progress: improving     Temp: 97.8  F (36.6  C) Temp src: Oral BP: 124/88 Pulse: 80   Resp: 16 SpO2: 100 % O2 Device: None (Room air)        Neuro: Alert and Oriented  x4, let needs known  Cardiac:WNL, denies cardiac chest pain, lowers non edematous  Respiratory:LS clear and dim on room air   GI/: Voiding AUOP; last BM in late evening of 9-29 per pt report; patient passing gas per report  Diet/Appetite: regular, denies nausea; BGT monitored  Skin:Skin intact, no new deficient  LDA: L PIV SL  Activity: Up IND  Pain: denies need for PRN  Plan: continue plan of care  Comment: patient appears very upset after offering to move trash can closer for use as there was trash on the floor; patient stating that writer is \"a racist\"; patient came to charge nurse requesting patient relations phone number      "

## 2024-09-30 NOTE — PROGRESS NOTES
Surgery Progress Note  09/30/2024       Subjective:  - Patient awake at bedside, tolerating regular diet without NVBH, pain well controlled, voiding without issue, passing bm/gas, ambulatory.     Objective:  Temp:  [97.8  F (36.6  C)-98.5  F (36.9  C)] 98.5  F (36.9  C)  Pulse:  [80-94] 80  Resp:  [16] 16  BP: (124-135)/(83-88) 124/85  SpO2:  [100 %] 100 %    I/O last 3 completed shifts:  In: 240 [P.O.:240]  Out: -       Gen: Awake, alert, NAD  Resp: NLB on RA  Abd: soft, nondistended, nontender  Ext: WWP, no edema     Labs:  Recent Labs   Lab 09/29/24  0632 09/28/24  0938   WBC 6.1 9.6   HGB 12.8* 14.5    424       Recent Labs   Lab 09/30/24  1338 09/30/24  1227 09/30/24  0935 09/29/24  1006 09/29/24  0632 09/28/24  1412 09/28/24  1252 09/28/24  0938   NA  --  136  --   --  133*  --   --  132*   POTASSIUM  --  3.8  --   --  3.8  --   --  3.5   CHLORIDE  --  104  --   --  101  --   --  96*   CO2  --  24  --   --  22  --   --  24   BUN  --  10.3  --   --  10.9  --   --  19.0   CR  --  0.70  --   --  0.61*  --  0.7 0.72   * 173* 140*   < > 262*   < >  --  379*   LINDA  --  9.1  --   --  8.5*  --   --  9.6    < > = values in this interval not displayed.       Imaging:  Reviewed     Assessment/Plan:   58 year old male with a history SBO's. HIV, T2DM, Polysubstance use, CNS Toxoplasmosis admitted for SBO that was found to be partially obstructed on CT.     -Recommend advancing to regular diet  -Due to return of bowel function, completion of gastrograffin challenge, and resolution of bowel obstructive symptoms surgery will sign off. If any further surgical issues occur, please contact us and we will be happy to help.     Seen, examined, and discussed with chief resident, who will discuss with staff.  - - - - - - - - - - - - - - - - - -  Koby Garrido MD  General Surgery, PGY-1  [facility]

## 2024-09-30 NOTE — PHARMACY-ADMISSION MEDICATION HISTORY
Pharmacist Admission Medication History    Admission medication history is complete. The information provided in this note is only as accurate as the sources available at the time of the update.    Information Source(s): Patient, Patient's pharmacy, and CareEverywhere/SureScripts via in-person and phone    Pertinent Information:   Patient was a good historian of medication list.   No SureScript fill history available. Called [facility] and patient fill history was added to each note.     Changes made to PTA medication list:  Added: None  Deleted:   Miralax > Patient reports no longer using.  Senna/docusate > Patient reports no longer using.  Changed:   Insulin Lantus BID > Patient reports taking 27 units, but only once a day and not twice a day like prescribed which may account for patients inconsistent fill history.    Allergies reviewed with patient and updates made in EHR: no    Medication History Completed By: Dawn Carranza RPH 9/30/2024 1:02 PM    PTA Med List   Medication Sig Note Last Dose    bictegravir-emtricitabine-tenofovir (BIKTARVY) -25 MG per tablet Take 1 tablet by mouth daily 9/30/2024: Per [facility] last fill was 8/26 for 30 days.      famotidine (PEPCID) 40 MG tablet Take 1 tablet (40 mg) by mouth daily      glipiZIDE (GLUCOTROL XL) 10 MG 24 hr tablet Take 1 tablet (10 mg) by mouth daily 9/30/2024: Per [facility] last fill was on 07/22 for 90 days.     insulin glargine (LANTUS PEN) 100 UNIT/ML pen Inject 27 units subcutaneously 2 times daily to lower blood sugar. Discard pens after they've been out of the fridge for 28 days. Keep at room temperature. 9/30/2024: Patient reports injecting once a day and not twice a day. Per [facility] patient last fill on 7/22 for 30 days and reports patient is inconsistent with medication refills.

## 2024-09-30 NOTE — PROGRESS NOTES
Brief Surgery Note    Following up GGC, see below. Contrast within the colon, extending all the way to the rectum. Excludes complete SBO. Okay for ongoing advancement of diet as tolerated.         Stevan Griffiths MD  General Surgery, PGY-1  12:50 AM 09/30/24    ** For on call schedules and contact information, please refer to [facility] **

## 2024-09-30 NOTE — PROGRESS NOTES
Pt Refused Cpap and doesn't want to use Cpap.  Pt on RA and sating high 90's    Sandeep Ovalles, RT

## 2024-09-30 NOTE — PLAN OF CARE
Goal Outcome Evaluation:      Plan of Care Reviewed With: patient    Overall Patient Progress: improvingOverall Patient Progress: improving    Outcome Evaluation: steady progress this shift    Temp: 98.5  F (36.9  C) Temp src: Oral BP: 132/83 Pulse: 91   Resp: 16 SpO2: 100 % O2 Device: None (Room air)       Time: 1900 - 0700  Reason for admission:  Generalized abdominal pain  Vitals:  Stable on RA  Diet:  clear liquids  Activity/Mobility:  UAL  Neuro:  A&Ox4  Pain/Nausea:  Pain denied. Denies nausea.   Respiratory: WDL on RA  Cardiac: WDL  GI:  Passing gas. No BM this shift.  :  Voiding spontaneously, not saving  Lines & Drains:  L PIV SL  Labs:  Monitored.   Incisions/Skin:  no new deficits    NEW CHANGES:  No acute changes this shift. Pt refused some assessments/vitals measurements.     Continue to implement Care Plan.    Aguila Simmons RN

## 2024-10-01 LAB — GLUCOSE BLDC GLUCOMTR-MCNC: 245 MG/DL (ref 70–99)

## 2024-10-01 PROCEDURE — 250N000013 HC RX MED GY IP 250 OP 250 PS 637: Performed by: INTERNAL MEDICINE

## 2024-10-01 PROCEDURE — 250N000011 HC RX IP 250 OP 636: Performed by: PHYSICIAN ASSISTANT

## 2024-10-01 PROCEDURE — 99207 PR NO BILLABLE SERVICE THIS VISIT: CPT | Performed by: STUDENT IN AN ORGANIZED HEALTH CARE EDUCATION/TRAINING PROGRAM

## 2024-10-01 RX ORDER — FAMOTIDINE 20 MG/1
40 TABLET, FILM COATED ORAL 2 TIMES DAILY
Status: DISCONTINUED | OUTPATIENT
Start: 2024-10-01 | End: 2024-10-01 | Stop reason: HOSPADM

## 2024-10-01 RX ORDER — ONDANSETRON 4 MG/1
4 TABLET, ORALLY DISINTEGRATING ORAL EVERY 6 HOURS PRN
Qty: 30 TABLET | Refills: 0 | OUTPATIENT
Start: 2024-10-01

## 2024-10-01 RX ADMIN — HYDROMORPHONE HYDROCHLORIDE 0.5 MG: 1 INJECTION, SOLUTION INTRAMUSCULAR; INTRAVENOUS; SUBCUTANEOUS at 01:31

## 2024-10-01 RX ADMIN — FAMOTIDINE 40 MG: 20 TABLET ORAL at 01:28

## 2024-10-01 RX ADMIN — INSULIN ASPART 3 UNITS: 100 INJECTION, SOLUTION INTRAVENOUS; SUBCUTANEOUS at 05:39

## 2024-10-01 ASSESSMENT — ACTIVITIES OF DAILY LIVING (ADL)
ADLS_ACUITY_SCORE: 37

## 2024-10-01 NOTE — PLAN OF CARE
Goal Outcome Evaluation:  Bs in the 299 and 333 this afternoon; covered per sliding scale; pt has been eating lots of gram crackers. Good appetite. Denied pain. Up ad lexi to bathroom.  Continue with POC

## 2024-10-01 NOTE — PLAN OF CARE
Goal Outcome Evaluation:      Plan of Care Reviewed With: patient    Overall Patient Progress: no change    Outcome Evaluation: continues to progress    Neuro:A/Ox4  Respiratory:WDL on RA  Cardiac:WDL. Denies chest pain  Diet:reg  GI/:voiding spontaneously, passing gas  Incision/Drains:none  IV Access:L PIV SL  Pain:managed with PRN dilaudid, oxy was offered but patient refused   VS:Temp: 97.4  F (36.3  C) Temp src: Oral BP: 129/87 Pulse: 94   Resp: 16 SpO2: 100 % O2 Device: None (Room air)     Activity:ind      New Changes this shift:pt refused 2am BS check and 4 am vitals  Plan: continue POC

## 2024-10-01 NOTE — DISCHARGE SUMMARY
I was unable to see the patient this AM as he had reportedly left the unit and never came back, and I was notified after the fact. Per RN, they did not have a chance to remove his PIV prior to discharge, so he likely left with this in. RN and security surveyed the immediate premises of the building but the patient was no where to be found after 2.5+ hours being gone from his room, so was discharged as AMA and bed forfeited.    Marcial Brian PA-C  Internal Medicine [facility] Hospitalist  [facility]  Pager [phone]

## 2024-10-01 NOTE — PLAN OF CARE
Goal Outcome Evaluation:      Plan of Care Reviewed With: other (see comments) (Not available in room fr the last three hours)    Overall Patient Progress: no changeOverall Patient Progress: no change       Shift 0730hrs onwards    The patient left AMA - we are unable to find the pt. .      Per reports  pt has been leaving room for an hour-hour and a half. This AM pt was last seen just before shift change leaving room(per usual-to take a walk per pt). Haven't seen pt this morning yet, unable to pass medications or conduct assessments, since its been a little over 2hrs - called security and sent staff to find pt. Pt not found outside unit or outside hospital in the vicinity. PIV was still in, not removed. No belongings found in room. Provider notified. Charge nurse aware. Orders to discharge pt as AMA.     Lillie Barnett RN

## 2024-10-07 ENCOUNTER — HOSPITAL ENCOUNTER (INPATIENT)
Facility: CLINIC | Age: 59
LOS: 2 days | Discharge: LEFT BEFORE DISCHARGE | End: 2024-10-10
Attending: EMERGENCY MEDICINE | Admitting: INTERNAL MEDICINE
Payer: COMMERCIAL

## 2024-10-07 ENCOUNTER — APPOINTMENT (OUTPATIENT)
Dept: CT IMAGING | Facility: CLINIC | Age: 59
End: 2024-10-07
Attending: EMERGENCY MEDICINE
Payer: COMMERCIAL

## 2024-10-07 ENCOUNTER — PRE VISIT (OUTPATIENT)
Dept: UROLOGY | Facility: CLINIC | Age: 59
End: 2024-10-07
Payer: COMMERCIAL

## 2024-10-07 ENCOUNTER — HOSPITAL ENCOUNTER (EMERGENCY)
Facility: CLINIC | Age: 59
Discharge: HOME | End: 2024-10-07
Admitting: PHYSICIAN ASSISTANT
Payer: COMMERCIAL

## 2024-10-07 VITALS
OXYGEN SATURATION: 99 % | TEMPERATURE: 98.1 F | RESPIRATION RATE: 18 BRPM | HEART RATE: 96 BPM | SYSTOLIC BLOOD PRESSURE: 132 MMHG | DIASTOLIC BLOOD PRESSURE: 76 MMHG

## 2024-10-07 DIAGNOSIS — L08.9 TOE INFECTION: ICD-10-CM

## 2024-10-07 DIAGNOSIS — K31.1 GASTRIC OUTLET OBSTRUCTION: ICD-10-CM

## 2024-10-07 LAB
ALBUMIN SERPL BCG-MCNC: 4.5 G/DL (ref 3.5–5.2)
ALP SERPL-CCNC: 113 U/L (ref 40–150)
ALT SERPL W P-5'-P-CCNC: 18 U/L (ref 0–70)
ANION GAP SERPL CALCULATED.3IONS-SCNC: 13 MMOL/L (ref 7–15)
AST SERPL W P-5'-P-CCNC: 20 U/L (ref 0–45)
BASOPHILS # BLD AUTO: 0 10E3/UL (ref 0–0.2)
BASOPHILS NFR BLD AUTO: 0 %
BILIRUB SERPL-MCNC: 0.8 MG/DL
BUN SERPL-MCNC: 13.7 MG/DL (ref 8–23)
CALCIUM SERPL-MCNC: 9.9 MG/DL (ref 8.8–10.4)
CHLORIDE SERPL-SCNC: 100 MMOL/L (ref 98–107)
CREAT SERPL-MCNC: 0.71 MG/DL (ref 0.67–1.17)
EGFRCR SERPLBLD CKD-EPI 2021: >90 ML/MIN/1.73M2
EOSINOPHIL # BLD AUTO: 0.1 10E3/UL (ref 0–0.7)
EOSINOPHIL NFR BLD AUTO: 1 %
ERYTHROCYTE [DISTWIDTH] IN BLOOD BY AUTOMATED COUNT: 13 % (ref 10–15)
GLUCOSE SERPL-MCNC: 274 MG/DL (ref 70–99)
HCO3 SERPL-SCNC: 27 MMOL/L (ref 22–29)
HCT VFR BLD AUTO: 45.6 % (ref 40–53)
HGB BLD-MCNC: 15.1 G/DL (ref 13.3–17.7)
IMM GRANULOCYTES # BLD: 0.1 10E3/UL
IMM GRANULOCYTES NFR BLD: 1 %
LACTATE SERPL-SCNC: 2 MMOL/L (ref 0.7–2)
LIPASE SERPL-CCNC: 31 U/L (ref 13–60)
LYMPHOCYTES # BLD AUTO: 2.9 10E3/UL (ref 0.8–5.3)
LYMPHOCYTES NFR BLD AUTO: 24 %
MCH RBC QN AUTO: 29.7 PG (ref 26.5–33)
MCHC RBC AUTO-ENTMCNC: 33.1 G/DL (ref 31.5–36.5)
MCV RBC AUTO: 90 FL (ref 78–100)
MONOCYTES # BLD AUTO: 0.9 10E3/UL (ref 0–1.3)
MONOCYTES NFR BLD AUTO: 7 %
NEUTROPHILS # BLD AUTO: 8.2 10E3/UL (ref 1.6–8.3)
NEUTROPHILS NFR BLD AUTO: 67 %
NRBC # BLD AUTO: 0 10E3/UL
NRBC BLD AUTO-RTO: 0 /100
PLATELET # BLD AUTO: 467 10E3/UL (ref 150–450)
POTASSIUM SERPL-SCNC: 3.6 MMOL/L (ref 3.4–5.3)
PROT SERPL-MCNC: 8.2 G/DL (ref 6.4–8.3)
RBC # BLD AUTO: 5.09 10E6/UL (ref 4.4–5.9)
SODIUM SERPL-SCNC: 140 MMOL/L (ref 135–145)
WBC # BLD AUTO: 12.2 10E3/UL (ref 4–11)

## 2024-10-07 PROCEDURE — 85025 COMPLETE CBC W/AUTO DIFF WBC: CPT | Performed by: EMERGENCY MEDICINE

## 2024-10-07 PROCEDURE — 83605 ASSAY OF LACTIC ACID: CPT | Performed by: EMERGENCY MEDICINE

## 2024-10-07 PROCEDURE — 99285 EMERGENCY DEPT VISIT HI MDM: CPT | Performed by: EMERGENCY MEDICINE

## 2024-10-07 PROCEDURE — 96361 HYDRATE IV INFUSION ADD-ON: CPT | Performed by: EMERGENCY MEDICINE

## 2024-10-07 PROCEDURE — 36415 COLL VENOUS BLD VENIPUNCTURE: CPT

## 2024-10-07 PROCEDURE — 74177 CT ABD & PELVIS W/CONTRAST: CPT | Mod: 26 | Performed by: RADIOLOGY

## 2024-10-07 PROCEDURE — 96375 TX/PRO/DX INJ NEW DRUG ADDON: CPT | Performed by: EMERGENCY MEDICINE

## 2024-10-07 PROCEDURE — 250N000011 HC RX IP 250 OP 636: Performed by: EMERGENCY MEDICINE

## 2024-10-07 PROCEDURE — 80053 COMPREHEN METABOLIC PANEL: CPT | Performed by: EMERGENCY MEDICINE

## 2024-10-07 PROCEDURE — 99284 EMERGENCY DEPT VISIT MOD MDM: CPT | Performed by: PHYSICIAN ASSISTANT

## 2024-10-07 PROCEDURE — 999N000248 HC STATISTIC IV INSERT WITH US BY RN

## 2024-10-07 PROCEDURE — 83690 ASSAY OF LIPASE: CPT | Performed by: EMERGENCY MEDICINE

## 2024-10-07 PROCEDURE — 96374 THER/PROPH/DIAG INJ IV PUSH: CPT | Mod: 59 | Performed by: EMERGENCY MEDICINE

## 2024-10-07 PROCEDURE — 258N000003 HC RX IP 258 OP 636: Performed by: EMERGENCY MEDICINE

## 2024-10-07 PROCEDURE — 74177 CT ABD & PELVIS W/CONTRAST: CPT

## 2024-10-07 PROCEDURE — 99285 EMERGENCY DEPT VISIT HI MDM: CPT | Mod: 25 | Performed by: EMERGENCY MEDICINE

## 2024-10-07 PROCEDURE — 36415 COLL VENOUS BLD VENIPUNCTURE: CPT | Performed by: EMERGENCY MEDICINE

## 2024-10-07 RX ORDER — CEFADROXIL 1000 MG/1
1 TABLET ORAL DAILY
Qty: 7 TABLET | Refills: 0 | Status: SHIPPED | OUTPATIENT
Start: 2024-10-07 | End: 2024-10-14

## 2024-10-07 RX ORDER — HYDROMORPHONE HCL IN WATER/PF 6 MG/30 ML
0.4 PATIENT CONTROLLED ANALGESIA SYRINGE INTRAVENOUS ONCE
Status: COMPLETED | OUTPATIENT
Start: 2024-10-07 | End: 2024-10-07

## 2024-10-07 RX ORDER — ONDANSETRON 2 MG/ML
4 INJECTION INTRAMUSCULAR; INTRAVENOUS ONCE
Status: COMPLETED | OUTPATIENT
Start: 2024-10-07 | End: 2024-10-07

## 2024-10-07 RX ORDER — IOPAMIDOL 755 MG/ML
92 INJECTION, SOLUTION INTRAVASCULAR ONCE
Status: COMPLETED | OUTPATIENT
Start: 2024-10-07 | End: 2024-10-07

## 2024-10-07 RX ORDER — DOXYCYCLINE 100 MG/1
100 CAPSULE ORAL 2 TIMES DAILY
Qty: 14 CAPSULE | Refills: 0 | Status: SHIPPED | OUTPATIENT
Start: 2024-10-07 | End: 2024-10-14

## 2024-10-07 RX ADMIN — SODIUM CHLORIDE 1000 ML: 9 INJECTION, SOLUTION INTRAVENOUS at 21:09

## 2024-10-07 RX ADMIN — HYDROMORPHONE HYDROCHLORIDE 0.4 MG: 0.2 INJECTION, SOLUTION INTRAMUSCULAR; INTRAVENOUS; SUBCUTANEOUS at 21:01

## 2024-10-07 RX ADMIN — ONDANSETRON 4 MG: 2 INJECTION INTRAMUSCULAR; INTRAVENOUS at 21:00

## 2024-10-07 RX ADMIN — IOPAMIDOL 92 ML: 755 INJECTION, SOLUTION INTRAVENOUS at 22:56

## 2024-10-07 ASSESSMENT — ACTIVITIES OF DAILY LIVING (ADL)
ADLS_ACUITY_SCORE: 37

## 2024-10-07 ASSESSMENT — COLUMBIA-SUICIDE SEVERITY RATING SCALE - C-SSRS
2. HAVE YOU ACTUALLY HAD ANY THOUGHTS OF KILLING YOURSELF IN THE PAST MONTH?: NO
6. HAVE YOU EVER DONE ANYTHING, STARTED TO DO ANYTHING, OR PREPARED TO DO ANYTHING TO END YOUR LIFE?: NO
6. HAVE YOU EVER DONE ANYTHING, STARTED TO DO ANYTHING, OR PREPARED TO DO ANYTHING TO END YOUR LIFE?: NO
2. HAVE YOU ACTUALLY HAD ANY THOUGHTS OF KILLING YOURSELF IN THE PAST MONTH?: NO
1. IN THE PAST MONTH, HAVE YOU WISHED YOU WERE DEAD OR WISHED YOU COULD GO TO SLEEP AND NOT WAKE UP?: NO
1. IN THE PAST MONTH, HAVE YOU WISHED YOU WERE DEAD OR WISHED YOU COULD GO TO SLEEP AND NOT WAKE UP?: NO

## 2024-10-07 NOTE — TELEPHONE ENCOUNTER
Reason for visit: 6 month follow up     Relevant information: review A1c values    Records/imaging/labs/orders: all records available    At Rooming:  Standard rooming      Frank Adame  10/7/2024  4:40 PM

## 2024-10-07 NOTE — ED TRIAGE NOTES
Pt ambulatory to ED w/ c/o R. Large Toe pain. Area is reddened and painful. Pt endorses fever and chills    BP (!) 149/84   Pulse 96   Temp 98.4  F (36.9  C)   Resp 16   SpO2 100%     Hx: T2DM, HIV, Homelessness     Triage Assessment (Adult)       Row Name 10/07/24 1105          Triage Assessment    Airway WDL WDL        Respiratory WDL    Respiratory WDL WDL        Skin Circulation/Temperature WDL    Skin Circulation/Temperature WDL X  R. Large toe wound        Cardiac WDL    Cardiac WDL WDL        Peripheral/Neurovascular WDL    Peripheral Neurovascular WDL WDL        Cognitive/Neuro/Behavioral WDL    Cognitive/Neuro/Behavioral WDL WDL

## 2024-10-07 NOTE — ED PROVIDER NOTES
ED Provider Note  [facility]      History     Chief Complaint   Patient presents with    Toe Pain    Wound Check     HPI   57yo undomiciled M pmhx  HIV, DMII, GERD, and recurrent SBO p/w atraumatic right great toe pain x yesterday. Reports subjective fevers. No paresthesia. Believes he may have pulled a hangnail off.     Notable, pt appears to have eloped last week after being admitted for SBO. At present he denies abd pain, and does not appear to have an IV in place from that visit (concern in notes he left with IV in place).     Past Medical History  Past Medical History:   Diagnosis Date    Acute appendicitis with localized peritonitis 1/31/2018    AIDS (H)     Allergic rhinitis due to other allergen     DNS    Anal dysplasia     Chronic abdominal pain     CNS toxoplasmosis (H)     COVID-19 1/11/2022    Diabetes type 2, controlled (H)     GERD (gastroesophageal reflux disease)     Herpes zoster 9/23/2016    History of seizure     History of substance abuse (H)     HIV (human immunodeficiency virus infection) (H)     HLD (hyperlipidemia)     Lung nodules     Periungual wart     Pneumonia 1/6/2019    PTSD (post-traumatic stress disorder)     Sleep apnea     doesn't use CPAP     Past Surgical History:   Procedure Laterality Date    ANOSCOPY N/A 9/9/2020    Procedure: Exam Under Anesthesia, ANOSCOPY, fulgeration of rectal fissures with Rectal Biopsies;  Surgeon: Thanh Lundberg MD;  Location: [facility]    COLONOSCOPY Left 1/22/2016    Procedure: COMBINED COLONOSCOPY, SINGLE OR MULTIPLE BIOPSY/POLYPECTOMY BY BIOPSY;  Surgeon: Clark Saini MD;  Location: [facility]    ESOPHAGOSCOPY, GASTROSCOPY, DUODENOSCOPY (EGD), COMBINED N/A 6/6/2022    Procedure: ESOPHAGOGASTRODUODENOSCOPY, WITH BIOPSY;  Surgeon: Kecia Benítez MD;  Location: [facility] GI    HC EXPLORE UNDESC TESTIS,INGUIN/SCROTAL      LAPAROSCOPIC APPENDECTOMY N/A 1/31/2018    Procedure: LAPAROSCOPIC APPENDECTOMY;  LAPAROSCOPIC APPENDECTOMY;  " Surgeon: Dawn Holt MD;  [facility]    LAPAROSCOPY DIAGNOSTIC (GENERAL) N/A 7/26/2016    Procedure: LAPAROSCOPY DIAGNOSTIC (GENERAL);  Surgeon: Susannah Arriaga MD;  [facility]    LAPAROSCOPY DIAGNOSTIC (GENERAL) N/A 4/16/2018    Procedure: LAPAROSCOPY DIAGNOSTIC (GENERAL);  Diagnostic laparoscopy and lysis of adhesions;  Surgeon: Prince Dowling MD;  [facility]    OPTICAL TRACKING SYSTEM CRANIOTOMY, EXCISE TUMOR, COMBINED Left 4/10/2015    Procedure: COMBINED OPTICAL TRACKING SYSTEM CRANIOTOMY, EXCISE TUMOR;  Surgeon: Mirlande Colmenares MD;  [facility]    REPAIR GAMEKEEPER'S THUMB Right 12/2/2016    Procedure: REPAIR LIGAMENT ULNAR COLLATERAL THUMB (GAMEKEEPER'S);  Surgeon: Evin Zamorano MD;  Location:  OR    ZZC NONSPECIFIC PROCEDURE      right forearm fracture     Cefadroxil (DURICEF) 1 g tablet  doxycycline hyclate (VIBRAMYCIN) 100 MG capsule  bictegravir-emtricitabine-tenofovir (BIKTARVY) -25 MG per tablet  blood glucose (NO BRAND SPECIFIED) lancets standard  blood glucose (NO BRAND SPECIFIED) test strip  blood glucose monitoring (NO BRAND SPECIFIED) meter device kit  Continuous Glucose Sensor (DEXCOM G6 SENSOR) MISC  Continuous Glucose Sensor (DEXCOM G7 SENSOR) MISC  Continuous Glucose Transmitter (DEXCOM G6 TRANSMITTER) MISC  famotidine (PEPCID) 40 MG tablet  glipiZIDE (GLUCOTROL XL) 10 MG 24 hr tablet  insulin glargine (LANTUS PEN) 100 UNIT/ML pen  insulin pen needle (32G X 4 MM) 32G X 4 MM miscellaneous  Nutritional Supplements (ENSURE ACTIVE) LIQD      Allergies   Allergen Reactions    Fentanyl Blisters     Per pt, after taking this medication had blisters develope    Tylenol [Acetaminophen] Itching    Dulaglutide Rash    Insulin Lispro Rash     Patient reported    Penicillin V Other (See Comments) and Rash     Diffuse maculopapular rash + feels \"high\", per pt.      Family History  Family History   Problem Relation Age of Onset    Diabetes " Brother     Diabetes Father     Alzheimer Disease Father     Unknown/Adopted Mother     Diabetes Paternal Grandfather     Cancer No family hx of         no skin cancer    Skin Cancer No family hx of         no famiy hx of skin cancer    Glaucoma No family hx of     Macular Degeneration No family hx of      Social History   Social History     Tobacco Use    Smoking status: Some Days     Current packs/day: 0.25     Average packs/day: 0.3 packs/day for 40.0 years (10.0 ttl pk-yrs)     Types: Cigarettes    Smokeless tobacco: Former   Substance Use Topics    Alcohol use: No     Alcohol/week: 0.0 standard drinks of alcohol     Comment: Last etoh in 2007    Drug use: Not Currently     Types: Marijuana, Methamphetamines      A medically appropriate review of systems was performed with pertinent positives and negatives noted in the HPI, and all other systems negative.    Physical Exam   BP: (!) 149/84  Pulse: 96  Temp: 98.4  F (36.9  C)  Resp: 16  SpO2: 100 %BP (!) 149/84   Pulse 96   Temp 98.4  F (36.9  C)   Resp 16   SpO2 100%     Physical Exam  Constitutional:       General: He is not in acute distress.     Appearance: Normal appearance. He is not toxic-appearing.   HENT:      Head: Atraumatic.   Eyes:      Conjunctiva/sclera: Conjunctivae normal.   Cardiovascular:      Rate and Rhythm: Normal rate.   Pulmonary:      Effort: Pulmonary effort is normal. No respiratory distress.   Musculoskeletal:      Cervical back: Neck supple.        Feet:    Skin:     General: Skin is warm.   Neurological:      Mental Status: He is alert.         ED Course, Procedures, & Data      Procedures                No results found for any visits on 10/07/24.  Medications - No data to display  Labs Ordered and Resulted from Time of ED Arrival to Time of ED Departure - No data to display  No orders to display          Critical care was not performed.     Medical Decision Making  The patient's presentation was of low complexity (an acute and  uncomplicated illness or injury).    The patient's evaluation involved:  review of external note(s) from 3+ sources (see separate area of note for details)    The patient's management necessitated moderate risk (prescription drug management including medications given in the ED) and moderate risk (limitations due to social determinants of health (housing insecurity and social isolation)).    Assessment & Plan     57yo undomiciled M pmhx  HIV, DMII, GERD, and recurrent SBO p/w atraumatic right great toe pain x yesterday. Reports subjective fevers. No paresthesia. Believes he may have pulled a hangnail off.     On exam, left great toe with tender, erythematous lateral nailfold without fluctuance or induration. No ingrown nail. Concern for early paronychia versus other soft tissue infection. Will discharge with doxy and cefadroxil. Advised warm soaks at shelter or drop in center, and ER/PCP/walk-in clinic wound recheck in 3-5 days, earlier for worsening.     I have reviewed the nursing notes. I have reviewed the findings, diagnosis, plan and need for follow up with the patient.    New Prescriptions    CEFADROXIL (DURICEF) 1 G TABLET    Take 1 tablet (1 g) by mouth daily for 7 days.    DOXYCYCLINE HYCLATE (VIBRAMYCIN) 100 MG CAPSULE    Take 1 capsule (100 mg) by mouth 2 times daily for 7 days.       Final diagnoses:   Toe infection         José Luis Payne PA-C  [facility] EMERGENCY DEPARTMENT  10/7/2024     José Luis Payne PA-C  10/07/24 1938

## 2024-10-08 LAB
ALBUMIN SERPL BCG-MCNC: 3.4 G/DL (ref 3.5–5.2)
ALP SERPL-CCNC: 93 U/L (ref 40–150)
ALT SERPL W P-5'-P-CCNC: 12 U/L (ref 0–70)
AMPHETAMINES UR QL SCN: ABNORMAL
ANION GAP SERPL CALCULATED.3IONS-SCNC: 10 MMOL/L (ref 7–15)
AST SERPL W P-5'-P-CCNC: 15 U/L (ref 0–45)
BARBITURATES UR QL SCN: ABNORMAL
BASOPHILS # BLD AUTO: 0 10E3/UL (ref 0–0.2)
BASOPHILS NFR BLD AUTO: 1 %
BENZODIAZ UR QL SCN: ABNORMAL
BILIRUB SERPL-MCNC: 0.7 MG/DL
BUN SERPL-MCNC: 14.2 MG/DL (ref 8–23)
BZE UR QL SCN: ABNORMAL
CALCIUM SERPL-MCNC: 8.6 MG/DL (ref 8.8–10.4)
CANNABINOIDS UR QL SCN: ABNORMAL
CHLORIDE SERPL-SCNC: 103 MMOL/L (ref 98–107)
CREAT SERPL-MCNC: 0.62 MG/DL (ref 0.67–1.17)
EGFRCR SERPLBLD CKD-EPI 2021: >90 ML/MIN/1.73M2
EOSINOPHIL # BLD AUTO: 0.2 10E3/UL (ref 0–0.7)
EOSINOPHIL NFR BLD AUTO: 3 %
ERYTHROCYTE [DISTWIDTH] IN BLOOD BY AUTOMATED COUNT: 13 % (ref 10–15)
FENTANYL UR QL: ABNORMAL
GLUCOSE BLDC GLUCOMTR-MCNC: 160 MG/DL (ref 70–99)
GLUCOSE BLDC GLUCOMTR-MCNC: 169 MG/DL (ref 70–99)
GLUCOSE BLDC GLUCOMTR-MCNC: 182 MG/DL (ref 70–99)
GLUCOSE BLDC GLUCOMTR-MCNC: 183 MG/DL (ref 70–99)
GLUCOSE BLDC GLUCOMTR-MCNC: 224 MG/DL (ref 70–99)
GLUCOSE SERPL-MCNC: 246 MG/DL (ref 70–99)
HCO3 SERPL-SCNC: 23 MMOL/L (ref 22–29)
HCT VFR BLD AUTO: 35.9 % (ref 40–53)
HGB BLD-MCNC: 11.9 G/DL (ref 13.3–17.7)
IMM GRANULOCYTES # BLD: 0 10E3/UL
IMM GRANULOCYTES NFR BLD: 0 %
INR PPP: 1.05 (ref 0.85–1.15)
LYMPHOCYTES # BLD AUTO: 2.7 10E3/UL (ref 0.8–5.3)
LYMPHOCYTES NFR BLD AUTO: 36 %
MCH RBC QN AUTO: 29.8 PG (ref 26.5–33)
MCHC RBC AUTO-ENTMCNC: 33.1 G/DL (ref 31.5–36.5)
MCV RBC AUTO: 90 FL (ref 78–100)
MONOCYTES # BLD AUTO: 0.5 10E3/UL (ref 0–1.3)
MONOCYTES NFR BLD AUTO: 7 %
MRSA DNA SPEC QL NAA+PROBE: POSITIVE
NEUTROPHILS # BLD AUTO: 4.1 10E3/UL (ref 1.6–8.3)
NEUTROPHILS NFR BLD AUTO: 54 %
NRBC # BLD AUTO: 0 10E3/UL
NRBC BLD AUTO-RTO: 0 /100
OPIATES UR QL SCN: ABNORMAL
PCP QUAL URINE (ROCHE): ABNORMAL
PLATELET # BLD AUTO: 348 10E3/UL (ref 150–450)
POTASSIUM SERPL-SCNC: 3.8 MMOL/L (ref 3.4–5.3)
PROT SERPL-MCNC: 6.3 G/DL (ref 6.4–8.3)
RBC # BLD AUTO: 3.99 10E6/UL (ref 4.4–5.9)
SA TARGET DNA: POSITIVE
SODIUM SERPL-SCNC: 136 MMOL/L (ref 135–145)
WBC # BLD AUTO: 7.6 10E3/UL (ref 4–11)

## 2024-10-08 PROCEDURE — 99222 1ST HOSP IP/OBS MODERATE 55: CPT | Mod: GC | Performed by: INTERNAL MEDICINE

## 2024-10-08 PROCEDURE — 250N000012 HC RX MED GY IP 250 OP 636 PS 637

## 2024-10-08 PROCEDURE — 80307 DRUG TEST PRSMV CHEM ANLYZR: CPT

## 2024-10-08 PROCEDURE — 250N000011 HC RX IP 250 OP 636: Performed by: EMERGENCY MEDICINE

## 2024-10-08 PROCEDURE — 99207 PR NO BILLABLE SERVICE THIS VISIT: CPT | Performed by: STUDENT IN AN ORGANIZED HEALTH CARE EDUCATION/TRAINING PROGRAM

## 2024-10-08 PROCEDURE — 120N000002 HC R&B MED SURG/OB UMMC

## 2024-10-08 PROCEDURE — 99255 IP/OBS CONSLTJ NEW/EST HI 80: CPT

## 2024-10-08 PROCEDURE — 80053 COMPREHEN METABOLIC PANEL: CPT

## 2024-10-08 PROCEDURE — 250N000013 HC RX MED GY IP 250 OP 250 PS 637

## 2024-10-08 PROCEDURE — 258N000003 HC RX IP 258 OP 636: Performed by: STUDENT IN AN ORGANIZED HEALTH CARE EDUCATION/TRAINING PROGRAM

## 2024-10-08 PROCEDURE — 36415 COLL VENOUS BLD VENIPUNCTURE: CPT

## 2024-10-08 PROCEDURE — 87641 MR-STAPH DNA AMP PROBE: CPT | Performed by: STUDENT IN AN ORGANIZED HEALTH CARE EDUCATION/TRAINING PROGRAM

## 2024-10-08 PROCEDURE — 85025 COMPLETE CBC W/AUTO DIFF WBC: CPT

## 2024-10-08 PROCEDURE — 85610 PROTHROMBIN TIME: CPT

## 2024-10-08 PROCEDURE — 82962 GLUCOSE BLOOD TEST: CPT

## 2024-10-08 PROCEDURE — 250N000011 HC RX IP 250 OP 636

## 2024-10-08 PROCEDURE — 87077 CULTURE AEROBIC IDENTIFY: CPT | Performed by: STUDENT IN AN ORGANIZED HEALTH CARE EDUCATION/TRAINING PROGRAM

## 2024-10-08 PROCEDURE — 250N000009 HC RX 250: Performed by: EMERGENCY MEDICINE

## 2024-10-08 RX ORDER — ACETAMINOPHEN 325 MG/1
975 TABLET ORAL EVERY 8 HOURS PRN
Status: DISCONTINUED | OUTPATIENT
Start: 2024-10-08 | End: 2024-10-10 | Stop reason: HOSPADM

## 2024-10-08 RX ORDER — ONDANSETRON 2 MG/ML
4 INJECTION INTRAMUSCULAR; INTRAVENOUS ONCE
Status: COMPLETED | OUTPATIENT
Start: 2024-10-08 | End: 2024-10-08

## 2024-10-08 RX ORDER — DOXYCYCLINE 100 MG/1
100 CAPSULE ORAL 2 TIMES DAILY
Status: DISCONTINUED | OUTPATIENT
Start: 2024-10-08 | End: 2024-10-10 | Stop reason: HOSPADM

## 2024-10-08 RX ORDER — HYDROMORPHONE HYDROCHLORIDE 1 MG/ML
0.3 INJECTION, SOLUTION INTRAMUSCULAR; INTRAVENOUS; SUBCUTANEOUS ONCE
Status: COMPLETED | OUTPATIENT
Start: 2024-10-08 | End: 2024-10-08

## 2024-10-08 RX ORDER — HYDROMORPHONE HYDROCHLORIDE 1 MG/ML
0.5 INJECTION, SOLUTION INTRAMUSCULAR; INTRAVENOUS; SUBCUTANEOUS ONCE
Status: COMPLETED | OUTPATIENT
Start: 2024-10-08 | End: 2024-10-08

## 2024-10-08 RX ORDER — CEFADROXIL 1000 MG/1
1 TABLET ORAL DAILY
Status: DISCONTINUED | OUTPATIENT
Start: 2024-10-08 | End: 2024-10-08

## 2024-10-08 RX ORDER — SODIUM CHLORIDE 9 MG/ML
INJECTION, SOLUTION INTRAVENOUS CONTINUOUS
Status: ACTIVE | OUTPATIENT
Start: 2024-10-08 | End: 2024-10-09

## 2024-10-08 RX ORDER — AMOXICILLIN 250 MG
1 CAPSULE ORAL 2 TIMES DAILY PRN
Status: DISCONTINUED | OUTPATIENT
Start: 2024-10-08 | End: 2024-10-10 | Stop reason: HOSPADM

## 2024-10-08 RX ORDER — CALCIUM CARBONATE 500 MG/1
1000 TABLET, CHEWABLE ORAL 4 TIMES DAILY PRN
Status: DISCONTINUED | OUTPATIENT
Start: 2024-10-08 | End: 2024-10-10 | Stop reason: HOSPADM

## 2024-10-08 RX ORDER — LIDOCAINE 40 MG/G
CREAM TOPICAL
Status: DISCONTINUED | OUTPATIENT
Start: 2024-10-08 | End: 2024-10-10 | Stop reason: HOSPADM

## 2024-10-08 RX ORDER — DEXTROSE MONOHYDRATE 25 G/50ML
25-50 INJECTION, SOLUTION INTRAVENOUS
Status: DISCONTINUED | OUTPATIENT
Start: 2024-10-08 | End: 2024-10-10 | Stop reason: HOSPADM

## 2024-10-08 RX ORDER — FAMOTIDINE 20 MG/1
40 TABLET, FILM COATED ORAL DAILY
Status: DISCONTINUED | OUTPATIENT
Start: 2024-10-08 | End: 2024-10-10 | Stop reason: HOSPADM

## 2024-10-08 RX ORDER — AMOXICILLIN 250 MG
2 CAPSULE ORAL 2 TIMES DAILY PRN
Status: DISCONTINUED | OUTPATIENT
Start: 2024-10-08 | End: 2024-10-10 | Stop reason: HOSPADM

## 2024-10-08 RX ORDER — NICOTINE POLACRILEX 4 MG
15-30 LOZENGE BUCCAL
Status: DISCONTINUED | OUTPATIENT
Start: 2024-10-08 | End: 2024-10-10 | Stop reason: HOSPADM

## 2024-10-08 RX ADMIN — HYDROMORPHONE HYDROCHLORIDE 0.3 MG: 1 INJECTION, SOLUTION INTRAMUSCULAR; INTRAVENOUS; SUBCUTANEOUS at 22:58

## 2024-10-08 RX ADMIN — BICTEGRAVIR SODIUM, EMTRICITABINE, AND TENOFOVIR ALAFENAMIDE FUMARATE 1 TABLET: 50; 200; 25 TABLET ORAL at 08:06

## 2024-10-08 RX ADMIN — DOXYCYCLINE 100 MG: 100 CAPSULE ORAL at 08:05

## 2024-10-08 RX ADMIN — DOXYCYCLINE 100 MG: 100 CAPSULE ORAL at 21:30

## 2024-10-08 RX ADMIN — SODIUM CHLORIDE: 9 INJECTION, SOLUTION INTRAVENOUS at 11:08

## 2024-10-08 RX ADMIN — ONDANSETRON 4 MG: 2 INJECTION INTRAMUSCULAR; INTRAVENOUS at 02:00

## 2024-10-08 RX ADMIN — INSULIN ASPART 1 UNITS: 100 INJECTION, SOLUTION INTRAVENOUS; SUBCUTANEOUS at 13:08

## 2024-10-08 RX ADMIN — FAMOTIDINE 40 MG: 20 TABLET ORAL at 08:05

## 2024-10-08 RX ADMIN — PANTOPRAZOLE SODIUM 80 MG: 40 INJECTION, POWDER, FOR SOLUTION INTRAVENOUS at 02:01

## 2024-10-08 RX ADMIN — ACETAMINOPHEN 975 MG: 325 TABLET, FILM COATED ORAL at 19:00

## 2024-10-08 RX ADMIN — INSULIN ASPART 1 UNITS: 100 INJECTION, SOLUTION INTRAVENOUS; SUBCUTANEOUS at 17:05

## 2024-10-08 RX ADMIN — HYDROMORPHONE HYDROCHLORIDE 0.5 MG: 1 INJECTION, SOLUTION INTRAMUSCULAR; INTRAVENOUS; SUBCUTANEOUS at 02:00

## 2024-10-08 ASSESSMENT — ACTIVITIES OF DAILY LIVING (ADL)
ADLS_ACUITY_SCORE: 37

## 2024-10-08 NOTE — ED PROVIDER NOTES
[place] EMERGENCY DEPARTMENT ([facility])    10/07/24       ED PROVIDER NOTE       History     Chief Complaint   Patient presents with    Abdominal Pain     HPI    Andrew Collier is a 58-year-old male who is homeless, and has a history of AIDS, GERD, type 2 diabetes, repeated episodes of small bowel obstruction, MDD, DAVID, methamphetamine abuse, who presents to the emergency department today with abdominal pain.  Patient was just admitted to the hospital for a suspected partial small bowel obstruction on 9/28/2024, left the hospital AMA.  Patient states he did this because he had a disagreement with his bedside nurse.  He claims that the symptoms had resolved, and then came back about 2 hours prior to arrival.  He is noticing severe sharp left sided abdominal pain which has been constant since onset.  He reports he has been vomiting approximately 7 times since onset of symptoms.  No hematemesis.  He denies passage of flatus in the past 2 days.  No diarrhea.  Denies fevers.  No other symptoms at this time, denies cough, shortness of breath, chest pain or urinary symptoms.      This part of the medical record was transcribed by Paris Durham Medical Scribe, from a dictation done by Leticia Cruz MD.      Past Medical History  Past Medical History:   Diagnosis Date    Acute appendicitis with localized peritonitis 1/31/2018    AIDS (H)     Allergic rhinitis due to other allergen     DNS    Anal dysplasia     Chronic abdominal pain     CNS toxoplasmosis (H)     COVID-19 1/11/2022    Diabetes type 2, controlled (H)     GERD (gastroesophageal reflux disease)     Herpes zoster 9/23/2016    History of seizure     History of substance abuse (H)     HIV (human immunodeficiency virus infection) (H)     HLD (hyperlipidemia)     Lung nodules     Periungual wart     Pneumonia 1/6/2019    PTSD (post-traumatic stress disorder)     Sleep apnea     doesn't use CPAP     Past Surgical History:   Procedure  Laterality Date    ANOSCOPY N/A 9/9/2020    Procedure: Exam Under Anesthesia, ANOSCOPY, fulgeration of rectal fissures with Rectal Biopsies;  Surgeon: Thanh Lundberg MD;  [facility]    COLONOSCOPY Left 1/22/2016    Procedure: COMBINED COLONOSCOPY, SINGLE OR MULTIPLE BIOPSY/POLYPECTOMY BY BIOPSY;  Surgeon: Clark Saini MD;  [facility]    ESOPHAGOSCOPY, GASTROSCOPY, DUODENOSCOPY (EGD), COMBINED N/A 6/6/2022    Procedure: ESOPHAGOGASTRODUODENOSCOPY, WITH BIOPSY;  Surgeon: Kecia Benítez MD;  Location: [facility]    HC EXPLORE UNDESC TESTIS,INGUIN/SCROTAL      LAPAROSCOPIC APPENDECTOMY N/A 1/31/2018    Procedure: LAPAROSCOPIC APPENDECTOMY;  LAPAROSCOPIC APPENDECTOMY;  Surgeon: Dawn Holt MD;  [facility]    LAPAROSCOPY DIAGNOSTIC (GENERAL) N/A 7/26/2016    Procedure: LAPAROSCOPY DIAGNOSTIC (GENERAL);  Surgeon: Susannah Arriaga MD;  [facility]    LAPAROSCOPY DIAGNOSTIC (GENERAL) N/A 4/16/2018    Procedure: LAPAROSCOPY DIAGNOSTIC (GENERAL);  Diagnostic laparoscopy and lysis of adhesions;  Surgeon: Prince Dowling MD;  [facility]    OPTICAL TRACKING SYSTEM CRANIOTOMY, EXCISE TUMOR, COMBINED Left 4/10/2015    Procedure: COMBINED OPTICAL TRACKING SYSTEM CRANIOTOMY, EXCISE TUMOR;  Surgeon: Mirlande Colmenares MD;  [facility]    REPAIR GAMEKEEPER'S THUMB Right 12/2/2016    Procedure: REPAIR LIGAMENT ULNAR COLLATERAL THUMB (GAMEKEEPER'S);  Surgeon: Evin Zamorano MD;  [facility]    ZZC NONSPECIFIC PROCEDURE      right forearm fracture     bictegravir-emtricitabine-tenofovir (BIKTARVY) -25 MG per tablet  blood glucose (NO BRAND SPECIFIED) lancets standard  blood glucose (NO BRAND SPECIFIED) test strip  blood glucose monitoring (NO BRAND SPECIFIED) meter device kit  Cefadroxil (DURICEF) 1 g tablet  Continuous Glucose Sensor (DEXCOM G6 SENSOR) MISC  Continuous Glucose Sensor (DEXCOM G7 SENSOR) MISC  Continuous Glucose Transmitter (DEXCOM G6  "TRANSMITTER) MISC  doxycycline hyclate (VIBRAMYCIN) 100 MG capsule  famotidine (PEPCID) 40 MG tablet  glipiZIDE (GLUCOTROL XL) 10 MG 24 hr tablet  insulin glargine (LANTUS PEN) 100 UNIT/ML pen  insulin pen needle (32G X 4 MM) 32G X 4 MM miscellaneous  Nutritional Supplements (ENSURE ACTIVE) LIQD      Allergies   Allergen Reactions    Fentanyl Blisters     Per pt, after taking this medication had blisters develope    Tylenol [Acetaminophen] Itching    Dulaglutide Rash    Insulin Lispro Rash     Patient reported    Penicillin V Other (See Comments) and Rash     Diffuse maculopapular rash + feels \"high\", per pt.      Family History  Family History   Problem Relation Age of Onset    Diabetes Brother     Diabetes Father     Alzheimer Disease Father     Unknown/Adopted Mother     Diabetes Paternal Grandfather     Cancer No family hx of         no skin cancer    Skin Cancer No family hx of         no famiy hx of skin cancer    Glaucoma No family hx of     Macular Degeneration No family hx of      Social History   Social History     Tobacco Use    Smoking status: Some Days     Current packs/day: 0.25     Average packs/day: 0.3 packs/day for 40.0 years (10.0 ttl pk-yrs)     Types: Cigarettes    Smokeless tobacco: Former   Substance Use Topics    Alcohol use: No     Alcohol/week: 0.0 standard drinks of alcohol     Comment: Last etoh in 2007    Drug use: Not Currently     Types: Marijuana, Methamphetamines      A complete review of systems was performed with pertinent positives and negatives noted in the HPI, and all other systems negative.    Physical Exam   BP: (!) 152/126  Pulse: 86  Temp: 98.1  F (36.7  C)  Resp: 16  SpO2: 99 %  Physical Exam  Vitals and nursing note reviewed.   Constitutional:       General: He is in acute distress.      Appearance: He is not diaphoretic.      Comments: Adult male, sitting in wheelchair, hunched forward, holding abdomen, appears distressed   HENT:      Head: Atraumatic.      Mouth/Throat: "      Mouth: Mucous membranes are moist.      Pharynx: Oropharynx is clear. No oropharyngeal exudate.   Eyes:      General: No scleral icterus.     Pupils: Pupils are equal, round, and reactive to light.   Cardiovascular:      Rate and Rhythm: Normal rate.      Pulses: Normal pulses.      Heart sounds: No murmur heard.  Pulmonary:      Effort: No respiratory distress.      Breath sounds: Normal breath sounds.   Abdominal:      Palpations: Abdomen is soft.      Tenderness: There is abdominal tenderness. There is no guarding.      Comments: Abdomen is soft, tender to palpation in left periumbilical region with some voluntary guarding, no rebound.  No left-sided tenderness to palpation.  Hypoactive bowel sounds.   Musculoskeletal:         General: No tenderness.   Skin:     General: Skin is warm.      Findings: No rash.   Neurological:      General: No focal deficit present.   Psychiatric:      Comments: irritable         ED Course, Procedures, & Data      Procedures                 Results for orders placed or performed during the hospital encounter of 10/07/24   Comprehensive metabolic panel     Status: Abnormal   Result Value Ref Range    Sodium 140 135 - 145 mmol/L    Potassium 3.6 3.4 - 5.3 mmol/L    Carbon Dioxide (CO2) 27 22 - 29 mmol/L    Anion Gap 13 7 - 15 mmol/L    Urea Nitrogen 13.7 8.0 - 23.0 mg/dL    Creatinine 0.71 0.67 - 1.17 mg/dL    GFR Estimate >90 >60 mL/min/1.73m2    Calcium 9.9 8.8 - 10.4 mg/dL    Chloride 100 98 - 107 mmol/L    Glucose 274 (H) 70 - 99 mg/dL    Alkaline Phosphatase 113 40 - 150 U/L    AST 20 0 - 45 U/L    ALT 18 0 - 70 U/L    Protein Total 8.2 6.4 - 8.3 g/dL    Albumin 4.5 3.5 - 5.2 g/dL    Bilirubin Total 0.8 <=1.2 mg/dL   Lipase     Status: Normal   Result Value Ref Range    Lipase 31 13 - 60 U/L   Lactic acid whole blood     Status: Normal   Result Value Ref Range    Lactic Acid 2.0 0.7 - 2.0 mmol/L   CBC with platelets and differential     Status: Abnormal   Result Value Ref  Range    WBC Count 12.2 (H) 4.0 - 11.0 10e3/uL    RBC Count 5.09 4.40 - 5.90 10e6/uL    Hemoglobin 15.1 13.3 - 17.7 g/dL    Hematocrit 45.6 40.0 - 53.0 %    MCV 90 78 - 100 fL    MCH 29.7 26.5 - 33.0 pg    MCHC 33.1 31.5 - 36.5 g/dL    RDW 13.0 10.0 - 15.0 %    Platelet Count 467 (H) 150 - 450 10e3/uL    % Neutrophils 67 %    % Lymphocytes 24 %    % Monocytes 7 %    % Eosinophils 1 %    % Basophils 0 %    % Immature Granulocytes 1 %    NRBCs per 100 WBC 0 <1 /100    Absolute Neutrophils 8.2 1.6 - 8.3 10e3/uL    Absolute Lymphocytes 2.9 0.8 - 5.3 10e3/uL    Absolute Monocytes 0.9 0.0 - 1.3 10e3/uL    Absolute Eosinophils 0.1 0.0 - 0.7 10e3/uL    Absolute Basophils 0.0 0.0 - 0.2 10e3/uL    Absolute Immature Granulocytes 0.1 <=0.4 10e3/uL    Absolute NRBCs 0.0 10e3/uL   CBC with Platelets & Differential     Status: Abnormal    Narrative    The following orders were created for panel order CBC with Platelets & Differential.  Procedure                               Abnormality         Status                     ---------                               -----------         ------                     CBC with platelets and d...[[id number]]  Abnormal            Final result                 Please view results for these tests on the individual orders.     Medications   ondansetron (ZOFRAN) injection 4 mg (4 mg Intravenous $Given 10/7/24 2100)   HYDROmorphone (DILAUDID) injection 0.4 mg (0.4 mg Intravenous $Given 10/7/24 2101)   sodium chloride 0.9% BOLUS 1,000 mL (0 mLs Intravenous Stopped 10/8/24 0006)   iopamidol (ISOVUE-370) solution 92 mL (92 mLs Intravenous $Given 10/7/24 2256)   sodium chloride (PF) 0.9% PF flush 73 mL (73 mLs Intravenous $Given 10/7/24 2256)     Labs Ordered and Resulted from Time of ED Arrival to Time of ED Departure   COMPREHENSIVE METABOLIC PANEL - Abnormal       Result Value    Sodium 140      Potassium 3.6      Carbon Dioxide (CO2) 27      Anion Gap 13      Urea Nitrogen 13.7      Creatinine 0.71       GFR Estimate >90      Calcium 9.9      Chloride 100      Glucose 274 (*)     Alkaline Phosphatase 113      AST 20      ALT 18      Protein Total 8.2      Albumin 4.5      Bilirubin Total 0.8     CBC WITH PLATELETS AND DIFFERENTIAL - Abnormal    WBC Count 12.2 (*)     RBC Count 5.09      Hemoglobin 15.1      Hematocrit 45.6      MCV 90      MCH 29.7      MCHC 33.1      RDW 13.0      Platelet Count 467 (*)     % Neutrophils 67      % Lymphocytes 24      % Monocytes 7      % Eosinophils 1      % Basophils 0      % Immature Granulocytes 1      NRBCs per 100 WBC 0      Absolute Neutrophils 8.2      Absolute Lymphocytes 2.9      Absolute Monocytes 0.9      Absolute Eosinophils 0.1      Absolute Basophils 0.0      Absolute Immature Granulocytes 0.1      Absolute NRBCs 0.0     LIPASE - Normal    Lipase 31     LACTIC ACID WHOLE BLOOD - Normal    Lactic Acid 2.0       CT Abdomen Pelvis w Contrast    (Results Pending)          Critical care was not performed.     Medical Decision Making  The patient's presentation was of high complexity (a chronic illness severe exacerbation, progression, or side effect of treatment).    The patient's evaluation involved:  review of external note(s) from 2 sources (see separate area of note for details)  review of 3+ test result(s) ordered prior to this encounter (see separate area of note for details)  ordering and/or review of 3+ test(s) in this encounter (see separate area of note for details)    The patient's management necessitated further care after sign-out to Dr. Odom (see their note for further management).    Assessment & Plan    Patient presents for above complaints.  On my evaluation he is alert, cooperative, appears to be distressed, he is quite irritable.  He is hypertensive at 152/126 though is in pain at this time.  Abdominal exam was remarkable for tenderness to palpation in the left periumbilical region.  There is some guarding but no rebound at present.  Bowel sounds are  a bit hypoactive.     Recent CT scan on 9/28/2024 shows that he had likely prior partial small bowel obstruction with dilated loops of small bowel with a transition point in the right lower quadrant with associated fecalization of small bowel contents, but again the patient left AMA from the hospital it does not appear that any definitive treatment was necessary.    We need to consider the possibility of recurrent small bowel obstruction in this high risk patient.    We did establish IV access and we did draw blood for laboratory analysis. CBC with a white count of 12.6, normal hemoglobin, platelets of 467, CMP is within normal limits except glucose is 274, lipase within normal limits at 31, lactate 2.0.  Patient was given IV fluids, dose of Zofran and Dilaudid here in the ED.  Abdominal CT scan is pending.    Patient will be signed out to the overnight physician to follow upon CT scan. If obstructed, expect need for admission.     This part of the medical record was transcribed by Paris Durham Medical Scribe, from a dictation done by Leticia Cruz MD.      I have reviewed the nursing notes. I have reviewed the findings, diagnosis, plan and need for follow up with the patient.    New Prescriptions    No medications on file       Final diagnoses:   None       Leticia Cruz MD  [facility] EMERGENCY DEPARTMENT  10/7/2024     Leticia Cruz MD  10/08/24 0018

## 2024-10-08 NOTE — PROGRESS NOTES
[facility]    Medicine Progress Note - Hospitalist Service, GOLD TEAM 7    Date of Admission:  10/7/2024    Assessment & Plan      Andrew Collier is a 58 year old male admitted on 10/7/2024. He has a history of GERD, HIV, DM2, MDD, DAVID, methamphetamine abuse, and recurrent SBO who presents with abdominal pain, nausea, and vomiting. He had previously been seen on 9/28 and now represents for similar concerns of SBO vs gastric outlet obstruction.        SBO vs gastric outlet obstruction  Patient with nausea, vomiting, and abdominal pain in the left lower quadrent that started around 3 PM.CT abdomen pelvis showed mild thickening and enhancement of small bowel in left lower quadrant where previously an obstruction had been.  Fluid-filled and gas-filled stomach also seen concerning for gastric outlet obstruction.  -GI consulted, see their note for res  -Pending GI consult consider  -NPO for now  - BID PPI  - NGT with suction  -Pain Tylenol (unclear if allergy itching is true allergy), can consider IV dilaudid overnight if pain poorly controlled.  - Avoid NSAIDS  -If abd pain, nausea, emesis worsens-- obtain recheck CT abd/pelvis with gen surgery consult      Infection of the left toe  Leukocytosis  Per patient he has a infection on his left big toe and had been prescribed doxycycline that he started taking today.  -Continue doxycycline 100 Mg twice daily       HIV   Follows with Dr. Ivory, last visit 7/2024.  CD4 count found to be 467 at that time  -Continue PTA Biktarvy     DM2 (Hgb A1c 11.7%)   PTA managed with Lantus 27units BID and glipizide 10mg daily.   -hold PTA glipizide   - continue lower dose of Lantus dose at 14 units BID in setting of n.p.o. status  -Hypoglycemia protocol  -Medium sliding scale insulin    HTN  Noted problem in patients problem list. Not clear if patient currently takes any medication. In setting of DM2 can consider ACE or ARB.  -Consider  ACE or ARB in AM      GERD  -PTA famotidine                Diet: NPO for Medical/Clinical Reasons Except for: Ice Chips, Meds    DVT Prophylaxis: Pneumatic Compression Devices  Potts Catheter: Not present  Lines: None     Cardiac Monitoring: None  Code Status: Full Code      Clinically Significant Risk Factors Present on Admission          # Hypocalcemia: Lowest Ca = 8.6 mg/dL in last 2 days, will monitor and replace as appropriate     # Hypoalbuminemia: Lowest albumin = 3.4 g/dL at 10/8/2024  6:01 AM, will monitor as appropriate            # DMII: A1C = 11.7 % (Ref range: <5.7 %) within past 6 months       # Financial/Environmental Concerns:           Disposition Plan     Medically Ready for Discharge: Anticipated in 2-4 Days             TIBURCIO LONG MD  Hospitalist Service, GOLD TEAM [address] [place] [address]  Securely message with [employer] (more info)  Text page via [facility] Paging/Directory   See signed in provider for up to date coverage information  ______________________________________________________________________    Interval History   Still having abd pain. No nausea or emesis right now. Did have nausea and nbnb prior to admission. Not been passing gas or stool. Urinating normally. No dysuria, flank pain. Afebrile.    Physical Exam   Vital Signs: Temp: 98.1  F (36.7  C) Temp src: Oral BP: (!) 142/86 Pulse: 89   Resp: 18 SpO2: 100 % O2 Device: None (Room air)    Weight: 0 lbs 0 oz    General Appearance: Awake, alert, interactive. In NAD  Respiratory: CTAB  Cardiovascular: Normal S1, S2.  GI: Abd soft. No rebound. Ttp over the Left abd  Skin: no rashes     Medical Decision Making       55 MINUTES SPENT BY ME on the date of service doing chart review, history, exam, documentation & further activities per the note.      Data     I have personally reviewed the following data over the past 24 hrs:    7.6  \   11.9 (L)   / 348     136 103 14.2 /  182 (H)   3.8 23 0.62 (L) \      ALT: 12 AST: 15 AP: 93 TBILI: 0.7   ALB: 3.4 (L) TOT PROTEIN: 6.3 (L) LIPASE: 31     Procal: N/A CRP: N/A Lactic Acid: 2.0       INR:  1.05 PTT:  N/A   D-dimer:  N/A Fibrinogen:  N/A       Imaging results reviewed over the past 24 hrs:   Recent Results (from the past 24 hour(s))   CT Abdomen Pelvis w Contrast    Narrative    EXAM: CT ABDOMEN PELVIS W CONTRAST  LOCATION: [facility]  DATE: 10/7/2024    INDICATION: Abdominal pain, vomiting, repeated SBO's, eval for interval change  COMPARISON: 9/28/2024  TECHNIQUE: CT scan of the abdomen and pelvis was performed following injection of IV contrast. Multiplanar reformats were obtained. Dose reduction techniques were used.  CONTRAST: 92ml isovue 370    FINDINGS:   LOWER CHEST: Minimal dependent atelectasis in the posterior lung bases.    HEPATOBILIARY: Normal contour with no significant mass. Stable focal fatty infiltration adjacent to the falciform ligament along the anterior liver. No bile duct dilatation. Calcified gallstone within the neck of the gallbladder. Gallbladder is   unremarkable.    PANCREAS: No significant mass, duct dilatation, or inflammatory change.    SPLEEN: Normal.    ADRENAL GLANDS: Normal.    KIDNEYS/BLADDER: The bilateral kidneys enhance symmetrically without evidence for hydronephrosis or pyelonephritis. No renal masses or calculi noted. The bilateral ureters and urinary bladder are unremarkable.    BOWEL: Marked fluid-filled and gas-filled stomach with mild thickening of the distal stomach at the pylorus. Clinical correlation for gastric outlet obstruction recommended.  Endoscopic evaluation recommended. There is some mild thickening and mild   enhancement of small bowel in the left lower quadrant. Previously there is an obstruction in this region and this may be postobstructive changes and/or enteritis.    LYMPH NODES: No lymphadenopathy.    VASCULATURE: No abdominal aortic aneurysm.    PELVIC  ORGANS: No pelvic masses. Penile implant.    MUSCULOSKELETAL: No concerning osseous abnormalities.      Impression    IMPRESSION:   1.  Marked fluid-filled and gas-filled stomach with mild thickening of the distal stomach at the pylorus. Clinical correlation for gastric outlet obstruction recommended. Endoscopic evaluation recommended.  2.  There is some mild thickening and enhancement of small bowel in the left lower quadrant. Previously there is an obstruction in this region and this may be postobstructive changes and/or enteritis.  3.  Cholelithiasis.

## 2024-10-08 NOTE — ED TRIAGE NOTES
Ambultaory to ED w/ c/o abdominal pain on the Left side. Pt states pain started ~ 3 hrs ago. Last BM 2 days ago. Denies eating any food.     BP (!) 152/126   Pulse 86   Temp 98.1  F (36.7  C) (Oral)   Resp 16   SpO2 99%     Hx: bowel obstruction      Triage Assessment (Adult)       Row Name 10/07/24 5442          Triage Assessment    Airway WDL WDL        Respiratory WDL    Respiratory WDL WDL        Skin Circulation/Temperature WDL    Skin Circulation/Temperature WDL WDL        Cardiac WDL    Cardiac WDL WDL        Peripheral/Neurovascular WDL    Peripheral Neurovascular WDL WDL        Cognitive/Neuro/Behavioral WDL    Cognitive/Neuro/Behavioral WDL WDL

## 2024-10-08 NOTE — PROGRESS NOTES
/88 (BP Location: Right arm)   Pulse 71   Temp 98.7  F (37.1  C) (Oral)   Resp 18   SpO2 100%     Neuro: A&Ox4.   Cardiac: Afebrile, VSS.   Respiratory: RA   GI/: Voiding spontaneously. No BM this shift.   Diet/appetite: NPO.. Denies nausea   Activity:Independent  Pain: Denies   Skin: No new deficits noted.  Lines:PIV  Drains:None  No new complaints.Will continue to monitor and follow plan of care.      Pt declined to have NG placed.

## 2024-10-08 NOTE — H&P
[facility]    History and Physical - Medicine Service, MAROON TEAM        Date of Admission:  10/7/2024    Assessment & Plan      Andrew Collier is a 58 year old male admitted on 10/7/2024. He has a history of GERD, HIV, DM2, MDD, DAVID, methamphetamine abuse, and recurrent SBO who presents with abdominal pain, nausea, and vomiting. He had previously been seen on 9/28 and now represents for similar concerns of SBO vs gastric outlet obstruction.        SBO vs gastric outlet obstruction  Patient with nausea, vomiting, and abdominal pain in the left lower quadrent that started around 3 PM.CT abdomen pelvis showed mild thickening and enhancement of small bowel in left lower quadrant where previously an obstruction had been.  Fluid-filled and gas-filled stomach also seen concerning for gastric outlet obstruction.  -GI consulted in ED appreciating recs;  -Pending GI consult consider  -NPO for now can consider advancing diet as tolerated in a.m.  -Pain Tylenol (unclear if allergy itching is true allergy), can consider IV dilaudid in AM  -Consider scheduling bowel regiment once diet is advanced      Infection of the left toe  Leukocytosis  Per patient he has a infection on his left big toe and had been prescribed doxycycline that he started taking today.  -Continue doxycycline 100 Mg twice daily       HIV   Follows with Dr. Ivory, last visit 7/2024.  CD4 count found to be 467 at that time  -Continue PTA Biktarvy     DM2 (Hgb A1c 11.7%)   PTA managed with Lantus 27units BID and glipizide 10mg daily.   -hold PTA glipizide   -reduce Lantus dose to 14 units BID in setting of n.p.o. status  -Hypoglycemia protocol  -Medium sliding scale insulin    HTN  Noted problem in patients problem list. Not clear if patient currently takes any medication. In setting of DM2 can consider ACE or ARB.  -Consider ACE or ARB in AM      GERD  -PTA famotidine                Diet: NPO for  Medical/Clinical Reasons Except for: Ice Chips, Meds    DVT Prophylaxis: Pneumatic Compression Devices, PADUA of 0-1  Potts Catheter: Not present  Fluids: None  Lines: None     Cardiac Monitoring: None  Code Status: Full Code    Clinically Significant Risk Factors Present on Admission                         # DMII: A1C = 11.7 % (Ref range: <5.7 %) within past 6 months         # Financial/Environmental Concerns:           Disposition Plan      Expected Discharge Date: 10/10/2024                The patient's care was discussed with the Attending Physician,    .      Sapna Gonzales MD  Medicine Service, [facility]  Securely message with [employer] (more info)  Text page via [facility] Paging/Directory   See signed in provider for up to date coverage information  ______________________________________________________________________    Chief Complaint   Abdominal pain    History is obtained from the patient    History of Present Illness   Andrew Collier is a 58 year old male who has a history of GERD, HIV, DM2, MDD, DAVID, methamphetamine abuse, and recurrent SBO who presents with abdominal pain, nausea, and vomiting and is admitted for  concerns of SBO vs gastric outlet obstruction.      Patient states that today at 3 PM began experiencing abdominal pain in the left lower quadrant of his abdomen.  Patient states that this is similar to his previous presentations of SBO and decided to come to the ED to have his abdomen evaluated.  Patient states that he had vomited 1 time today and endorses nausea, but no diarrhea.  Patient has not had a bowel movement at this time.  Patient denies any fevers or chills at this time.  Patient denies any chest pain, shortness of breath, or bloody stools.  Per documentation in chart patient, patient left AMA from hospital on 10/1. Per discussion with the ED provider patient states that he did this due to a disagreement with his  bedside nurse.    Patient denies alcohol use, Drug use, or tobacco product use.     Past Medical History    Past Medical History:   Diagnosis Date    Acute appendicitis with localized peritonitis 1/31/2018    AIDS (H)     Allergic rhinitis due to other allergen     DNS    Anal dysplasia     Chronic abdominal pain     CNS toxoplasmosis (H)     COVID-19 1/11/2022    Diabetes type 2, controlled (H)     GERD (gastroesophageal reflux disease)     Herpes zoster 9/23/2016    History of seizure     History of substance abuse (H)     HIV (human immunodeficiency virus infection) (H)     HLD (hyperlipidemia)     Lung nodules     Periungual wart     Pneumonia 1/6/2019    PTSD (post-traumatic stress disorder)     Sleep apnea     doesn't use CPAP       Past Surgical History   Past Surgical History:   Procedure Laterality Date    ANOSCOPY N/A 9/9/2020    Procedure: Exam Under Anesthesia, ANOSCOPY, fulgeration of rectal fissures with Rectal Biopsies;  Surgeon: Thanh Lundberg MD;  Location: [facility] OR    COLONOSCOPY Left 1/22/2016    Procedure: COMBINED COLONOSCOPY, SINGLE OR MULTIPLE BIOPSY/POLYPECTOMY BY BIOPSY;  Surgeon: Clark Saini MD;  Location: [facility] GI    ESOPHAGOSCOPY, GASTROSCOPY, DUODENOSCOPY (EGD), COMBINED N/A 6/6/2022    Procedure: ESOPHAGOGASTRODUODENOSCOPY, WITH BIOPSY;  Surgeon: Kecia Benítez MD;  Location: [facility] GI    HC EXPLORE UNDESC TESTIS,INGUIN/SCROTAL      LAPAROSCOPIC APPENDECTOMY N/A 1/31/2018    Procedure: LAPAROSCOPIC APPENDECTOMY;  LAPAROSCOPIC APPENDECTOMY;  Surgeon: Dawn Holt MD;  Location: [facility] OR    LAPAROSCOPY DIAGNOSTIC (GENERAL) N/A 7/26/2016    Procedure: LAPAROSCOPY DIAGNOSTIC (GENERAL);  Surgeon: Susannah Arriaga MD;  Location: [facility] OR    LAPAROSCOPY DIAGNOSTIC (GENERAL) N/A 4/16/2018    Procedure: LAPAROSCOPY DIAGNOSTIC (GENERAL);  Diagnostic laparoscopy and lysis of adhesions;  Surgeon: Prince Dowling MD;  Location: [facility] OR    OPTICAL TRACKING SYSTEM CRANIOTOMY,  EXCISE TUMOR, COMBINED Left 4/10/2015    Procedure: COMBINED OPTICAL TRACKING SYSTEM CRANIOTOMY, EXCISE TUMOR;  Surgeon: Mirlande Colmenares MD;  Location: [facility] OR    REPAIR GAMEKEEPER'S THUMB Right 2016    Procedure: REPAIR LIGAMENT ULNAR COLLATERAL THUMB (GAMEKEEPER'S);  Surgeon: Evin Zamorano MD;  Location:  OR    [facility] NONSPECIFIC PROCEDURE      right forearm fracture       Prior to Admission Medications   Prior to Admission Medications   Prescriptions Last Dose Informant Patient Reported? Taking?   Cefadroxil (DURICEF) 1 g tablet   No No   Sig: Take 1 tablet (1 g) by mouth daily for 7 days.   Continuous Glucose Sensor (DEXCOM G6 SENSOR) MISC   No No   Si each every 10 days Change every 10 days.   Continuous Glucose Sensor (DEXCOM G7 SENSOR) MISC   No No   Si Device every 10 days.   Continuous Glucose Transmitter (DEXCOM G6 TRANSMITTER) MISC   No No   Si each every 3 months Change every 3 months.   Nutritional Supplements (ENSURE ACTIVE) LIQD   No No   Sig: Take 414 mLs by mouth daily   bictegravir-emtricitabine-tenofovir (BIKTARVY) -25 MG per tablet   No No   Sig: Take 1 tablet by mouth daily   blood glucose (NO BRAND SPECIFIED) lancets standard   No No   Sig: Use to test blood sugar 1 time daily or as directed.   blood glucose (NO BRAND SPECIFIED) test strip   No No   Sig: Use to test blood sugar 1 time daily or as directed.   blood glucose monitoring (NO BRAND SPECIFIED) meter device kit   No No   Sig: Use to test blood sugar 3 times daily or as directed.   doxycycline hyclate (VIBRAMYCIN) 100 MG capsule   No No   Sig: Take 1 capsule (100 mg) by mouth 2 times daily for 7 days.   famotidine (PEPCID) 40 MG tablet   No No   Sig: Take 1 tablet (40 mg) by mouth daily   glipiZIDE (GLUCOTROL XL) 10 MG 24 hr tablet   No No   Sig: Take 1 tablet (10 mg) by mouth daily   insulin glargine (LANTUS PEN) 100 UNIT/ML pen   No No   Sig: Inject 27 units subcutaneously 2 times daily to lower blood  sugar. Discard pens after they've been out of the fridge for 28 days. Keep at room temperature.   insulin pen needle (32G X 4 MM) 32G X 4 MM miscellaneous   No No   Sig: Use 4-5 pen needles daily or as directed.      Facility-Administered Medications: None           Physical Exam   Vital Signs: Temp: 98  F (36.7  C) Temp src: Oral BP: (!) 152/87 Pulse: 89   Resp: 18 SpO2: 100 % O2 Device: None (Room air)    Weight: 0 lbs 0 oz    General Appearance: Awake, lying down, NAD  Respiratory: CTAB, good movement of air on room air  Cardiovascular: RRR, no murmurs  GI: Hypoactive bowel sounds, soft, tenderness to palpation in the left lower quadrant  Skin: Mild swelling present on left big toe, no discharge noted on inspection      Medical Decision Making             Data     I have personally reviewed the following data over the past 24 hrs:    12.2 (H)  \   15.1   / 467 (H)     140 100 13.7 /  274 (H)   3.6 27 0.71 \     ALT: 18 AST: 20 AP: 113 TBILI: 0.8   ALB: 4.5 TOT PROTEIN: 8.2 LIPASE: 31     Procal: N/A CRP: N/A Lactic Acid: 2.0         Imaging results reviewed over the past 24 hrs:   Recent Results (from the past 24 hour(s))   CT Abdomen Pelvis w Contrast    Narrative    EXAM: CT ABDOMEN PELVIS W CONTRAST  LOCATION: [facility]  DATE: 10/7/2024    INDICATION: Abdominal pain, vomiting, repeated SBO's, eval for interval change  COMPARISON: 9/28/2024  TECHNIQUE: CT scan of the abdomen and pelvis was performed following injection of IV contrast. Multiplanar reformats were obtained. Dose reduction techniques were used.  CONTRAST: 92ml isovue 370    FINDINGS:   LOWER CHEST: Minimal dependent atelectasis in the posterior lung bases.    HEPATOBILIARY: Normal contour with no significant mass. Stable focal fatty infiltration adjacent to the falciform ligament along the anterior liver. No bile duct dilatation. Calcified gallstone within the neck of the gallbladder. Gallbladder is    unremarkable.    PANCREAS: No significant mass, duct dilatation, or inflammatory change.    SPLEEN: Normal.    ADRENAL GLANDS: Normal.    KIDNEYS/BLADDER: The bilateral kidneys enhance symmetrically without evidence for hydronephrosis or pyelonephritis. No renal masses or calculi noted. The bilateral ureters and urinary bladder are unremarkable.    BOWEL: Marked fluid-filled and gas-filled stomach with mild thickening of the distal stomach at the pylorus. Clinical correlation for gastric outlet obstruction recommended.  Endoscopic evaluation recommended. There is some mild thickening and mild   enhancement of small bowel in the left lower quadrant. Previously there is an obstruction in this region and this may be postobstructive changes and/or enteritis.    LYMPH NODES: No lymphadenopathy.    VASCULATURE: No abdominal aortic aneurysm.    PELVIC ORGANS: No pelvic masses. Penile implant.    MUSCULOSKELETAL: No concerning osseous abnormalities.      Impression    IMPRESSION:   1.  Marked fluid-filled and gas-filled stomach with mild thickening of the distal stomach at the pylorus. Clinical correlation for gastric outlet obstruction recommended. Endoscopic evaluation recommended.  2.  There is some mild thickening and enhancement of small bowel in the left lower quadrant. Previously there is an obstruction in this region and this may be postobstructive changes and/or enteritis.  3.  Cholelithiasis.

## 2024-10-08 NOTE — CONSULTS
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     The GI Luminal Service is consulted regarding concern for gastric outlet obstruction.       # Gastric Outlet Obstruction  # Abnormal CT Scan of the GI Tract  CT Abdomen/Pelvis with Contrast on 10/7/2024 reports marked fluid-filled and gas-filled stomach with mild thickening of the distal stomach at the pylorus, concerning for peptic ulcer versus malignant ulceration at the pylorus leading gastric outlet obstruction and subsequent stomach dilation. Recommend placing and nasogastric (NG) tube to low intermittent suction (LIS) for gastric decompression with plan for EGD on Thursday 10/11/2024 with the assistance of Anesthesia for sedation for further evaluation.       # Left-Sided Abdominal Pain  # Recurrent Small Bowel Obstructions  CT Abdomen/Pelvis with Contrast on 10/7/2024 reports some mild thickening and enhancement of the small bowel in the left lower quadrant noting previously there is an obstruction in the region so may be post-obstructive changes and/or enteritis, cholelithiasis. Patient  with ongoing left-sided abdominal pain and reportedly no bowel movement or flatus in 2 days. Unclear whether patient currently has ongoing partial small bowel obstruction. Favor continuing supportive cares and conservative management with NG tube to LIS pending further evaluation of Gastric Outlet Obstruction via EGD on Thursday. Future considerations after endoscopic evaluation of the gastric outlet obstruction would be consideration General Surgery consult for potential partial small bowel obstruction and prior to advancing diet, consider Gastrografin Upper GI Series with Small Bowel Follow-Through to assess the small bowel area with enhancement.       Recommendations:  -- Continue NPO status.     -- Place NGT to LIS for gastric decompression ASAP.     -- Start IV PPI 40 mg BID.     -- Obtain Urine Drug Screen.     -- Collect H. Pylori stool antigen.     -- Plan for EGD on Thursday 10/11/2024 with Anesthesia for further evaluation.  - NPO at 0015 on 10/11/2024 for EGD.     -- Strict documentation of rectal output.  - Appreciate detailed documentation of stool appearance, including color, consistency, frequency and amount.   - Consider smearing stool thinly on white paper towel to distinguish color.     -- Maintain 2 large bore IVs, 18G or larger.  -- Continue Supportive Cares  - Adequate volume resuscitation with IVF, pRBCs.  - Monitor Hemoglobin closely. Transfuse to keep Hgb > 7 g/dL from GI standpoint.   - Monitor Platelets closely. Keep PLT > 50 10e3/uL from GI standpoint.  - Maintain hemodynamics: MAP >65 mmHg and HR <100.  - Monitor and optimize electrolytes.  - Reposition every 30 minutes while awake.  -- Avoid NSAIDs.  -- Analgesics/Antiemetics per primary team.  -- If the patient experiences overt GI bleeding with hemodynamic instability, please page the GI Luminal Service (listed on [employer]).       Outpatient:  -- TBD pending hospital course.       COVID status: not tested this admission.     Discussed  with Dr. Lavell Loyola - Medicine Team.     Thank you for involving us in this patient's care. Please do not hesitate to contact the GI service with any questions or concerns.     The patient was discussed and plan agreed upon with Dr. Preet Bhatti, GI Luminal Service staff physician.    Overall time spent on the date of this encounter preparing to see the patient (including chart review of available notes, clinical status events, imaging and labs); discussing, ordering, coordinating recommended medications, tests or procedures; communicating with other health care professionals; and documenting the above clinical information in the electronic medical record was 90 minutes.    Caitie Del Cid PA-C  Inpatient Gastroenterology Service  [facility]  Text Page         History of Present Illness:   Patient seen and examined at 0900. History is obtained from patient and chart review.    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     The GI Luminal Service is consulted regarding concern for gastric outlet obstruction.       Reports nausea with last emesis yesterday 10/7/2024. Reports ongoing left-sided abdominal pain.     Last bowel movement and flatus was reportedly 2 days ago.     Denies alcohol and drug use. Reports intermittent cigarettes smoking when has money to buy cigarettes.     Reports currently homeless.      Discussed NGT for gastric decompression with EGD Thursday. Patient is amenable to proceeding.       Per ED HPI 10/7/2024:   Andrew Collier is a 58-year-old male who is homeless, and has a history of AIDS, GERD, type 2 diabetes, repeated episodes of small bowel obstruction, MDD, DAVID, methamphetamine abuse, who presents to the emergency department today with abdominal pain.  Patient was just admitted to the hospital for a suspected partial small bowel obstruction on 9/28/2024, left the hospital AMA.  Patient states he did this because he had a disagreement with his bedside nurse.  He claims that the symptoms had resolved, and then came back about 2 hours prior to arrival.  He is noticing severe sharp left sided abdominal pain which has been constant since onset.  He reports he has been vomiting approximately 7 times since onset of symptoms.  No hematemesis.  He denies passage of flatus in the past 2 days.  No diarrhea.  Denies fevers.  No other symptoms at this time, denies cough, shortness of breath, chest pain or urinary symptoms      Previous Procedures:    6/6/2022 - EGD - Dr. Kecia Benítez  Indications:           Suspected gastric outlet obstruction   Patient Profile:       This is a 54 year old male with a PMH of HIV                          infection, recurrent small bowel obstruction is seen                          by GI for an EGD to exclude GOO.   Findings:       LA Grade B (one or more mucosal breaks greater than 5 mm, not extending        between the tops of two mucosal folds) esophagitis with no bleeding was        found.        A small hiatal hernia was present.        There is no endoscopic evidence of bleeding, erythema or inflammatory        changes suggestive of gastritis, mucosal abnormalities or ulceration in        the entire examined stomach.        The examined duodenum was normal. Biopsies were taken with a cold        forceps for histology.                                                                                     Moderate Sedation:        Moderate (conscious) sedation was administered by the endoscopy nurse        and supervised by the endoscopist. The patient's oxygen saturation,        heart rate, blood pressure and response to care were monitored. Total        physician intraservice time was 18 minutes.   Impression:            - LA Grade B reflux esophagitis with no bleeding.                          - Small hiatal hernia.                          - Normal examined duodenum. Biopsied.     Final Diagnosis   A.  DUODENUM, BIOPSY:  Acute duodenitis; uncertain etiology; no evidence of celiac disease  (See Comment)     B.  STOMACH, ANTRUM, BIOPSY:  Gastric antral & fundic mucosa with fundic PPI-related changes; no significant inflammation; negative for H. pylori, intestinal metaplasia, or dysplasia       Electronically signed by Bhavani Ulloa MD on 6/7/2022 at 12:45 PM   Comment  [facility]   Duodenitis etiology is uncertain but although an infective agent is not identified, if possible additional stool examination to rule out entities such as Giardia and others should be considered, as well as excluding medication and/or peptic related sources.         9/9/2020 - Examination under anesthesia with high-definition anal microscopy, biopsy and fulguration and anal dysplasia - Colorectal Surgery Dr. Thanh Lundberg  Procedure Date: 09/09/2020      PREOPERATIVE DIAGNOSIS:  Anal dysplasia.      POSTOPERATIVE DIAGNOSIS:  Anal dysplasia.      PROCEDURE:  Examination under anesthesia with high-definition anal microscopy, biopsy and fulguration of anal dysplasia.      HISTORY:  This 52-year-old HIV positive man was seen in the Colorectal Surgery Clinic by VIKASH Hidalgo in 06/2018.  Anal cytology at that time showed LSIL.  The patient could not tolerate microscopy in the clinic and was accordingly referred for operative microscopy.  This proved difficult to schedule due to the  patient having been homeless for some period of time.  He now returns for anal microscopy and treatment of any identified dysplasia under anesthesia.  I discussed the risks and alternatives of the procedure in detail to the patient.  I answered all of his questions to his stated satisfaction.  He expresses understanding and provided written informed consent.      SURGEON:  Thanh Lundberg MD.      ASSISTANT:   amy Dumont MD.      DESCRIPTION OF PROCEDURE:  With the patient in the left lateral decubitus position, under intravenous sedation by Anesthesia, the perianal skin was prepped and draped in sterile fashion.  A timeout was performed and the patient and procedure confirmed.  Local infiltration of 0.25% with epinephrine was utilized and a satisfactory perianal block was obtained.  The patient had numerous secretions that required turning off his sedation until these could be controlled.  He is probably a safer bet to perform his next microscopy under general anesthesia where we will have better control of his airway.  He very briefly desaturated, but responded rapidly to mask and had no ongoing problems.  The anal canal and perianal skin were soaked with 5% acetic acid and high-definition anal microscopy was performed using the colposcope.  There were no external lesions.  Eversion of the anal canal showed diffuse acetowhitening in moderate to coarse punctation extending all the way to the anal margin.  Multiple representative biopsies were obtained from the anal margin and the anal canal and sent for permanent section.  The most intense areas of acetowhitening and punctation were fulgurated using electrocautery.  There were milder areas of acetowhitening that I chose not to address due to the moderately extensive fulguration of the anal canal that I already performed.  I feel it would be safer to await biopsies and repeat microscopy in several months' time to continue with fulguration after he has healed the  current procedure.  The procedure was accordingly terminated and the patient was returned to the recovery area in satisfactory condition.  Sponge, needle and instrument counts were reported as correct at the conclusion of the case x2.  There were no immediate operative complications.     FINAL DIAGNOSIS:  A. Anus, left posterior margin, anoscopy:  - Low grade squamous intraepithelial lesion (formerly AIN 1)  - No evidence of high-grade intraepithelial lesion or malignancy    B. Anus, left anterior margin, anoscopy:  - Low grade squamous intraepithelial lesion (formerly AIN 1)  - No evidence of high-grade intraepithelial lesion or malignancy    C. Anus, right medial margin, anoscopy:  - Low grade squamous intraepithelial lesion (formerly AIN 1)  - No evidence of high-grade intraepithelial lesion or malignancy    D. Anus, right medial margin, anoscopy:  - Low grade squamous intraepithelial lesion (formerly AIN 1)  - No evidence of high-grade intraepithelial lesion or malignancy    E. Anus, right posterior anal canal, anoscopy  - Low grade squamous intraepithelial lesion (formerly AIN 1)  - No evidence of high-grade intraepithelial lesion or malignancy    F. Anus, anterior anal canal. anoscopy:  - High grade squamous intraepithelial lesion (formerly AIN 2)  - No evidence of invasive malignancy       1/22/2016 - Colonoscopy - Dr. Clark Saini  Indications:         Chronic diarrhea   Patient Profile:     49 yo Male with AIDS (CD4 24), CMV viremia, CNS                        toxoplasmosis, and anal lesions with HSV+ stain with                        intermittent diarrhea and abdominal pain. Found to have                        partial SBO with thickening in the distal jejunum which                        has resolved on interval imaging. Colonoscopy performed                        for further evaluation.   Findings:       The perianal and digital rectal examinations were normal.        The colon (entire examined portion)  appeared normal. Biopsies were taken        with a cold forceps from the right colon and left colon for evaluation        of microscopic colitis.        The ileum, 10-15 cm from the ileocecal valve appeared normal.                                                                                    Impression:          - The entire examined colon is normal. Biopsied to                        evaluate for microscopic colitis.                        - The examined portion of the ileum was normal.                        - Based on this exam, no etiology of small bowel                        thickening was noted. We were unable to reach the area                        of concern. If further evaluation needed, would consider                        single balloon enteroscopy, versus capsule endoscopy,                        versus repeat imaging to see if abnormality persists (as                        already improving on CT).     FINAL DIAGNOSIS:   A. Large colon, right colon, biopsy:   -Colonic mucosa with no significant histologic abnormality   -See comment     B. Large bowel, left colon, biopsy:   -Colonic mucosa with no significant histologic abnormality   -See comment     COMMENT:   Some focal areas showed slightly increased subepithelial collagen.   Trichrome stain performed on both specimens with appropriate controls   show no conclusive evidence of thickened subepithelial collagen layer.         1/18/2016 - Small Bowel Enteroscopy - Dr. Marcus Nunez  Indications:         Abnormal abdominal CT   Patient Profile:     47 YO M with history of AIDS (CD4 count 24),                        disseminated varicella, CMV viremia, CNS toxoplasmosis,                        anal lesion with +HSV stain now with recurrent                        admissions for crampy lower abdominal pain and loose                        stools. Upon admission actually had no stools. CT scan                        showed question of partial SBO.  Thickening seen in the                        distal jejunum near area of transition point. Stool                        infectious studies negative for infection thus far. Now                        patient has antegrade bowel function. Reviewing CT shows                        question of some thickening in duodenum. Now attempting                        EGD with push enteroscopy to evaluate upper GI tract to                        distal jejunum and obtain biopsies.     Impression:          - Z-line regular, 35 cm from the incisors.                        - LA Grade A reflux esophagitis.                        - White plaques mucosa in the esophagus. Biopsied.                        - Striped and patchy moderately erythematous mucosa                        without bleeding was found in the gastric body and in                        the gastric antrum. Some areas appeared with more                        discrete beefy red erythema. There were a few scattered                        small erosions. Biopsied.                        - Normal examined duodenum. Biopsied.                        - The examined portion of the jejunum was normal.                        Biopsied.                        - Many biopsies were taken. Please see details of                        biopsies above under findings     FINAL DIAGNOSIS:  A. Small bowel, biopsy:  -Small intestinal mucosa with no significant histologic abnormality    B. Stomach, biopsy:  -Features of reactive gastropathy  -Negative for intestinal metaplasia or dysplasia    C. Small bowel, jejunum, biopsy:  -Small intestinal mucosa with no significant histologic abnormality  -Negative for HSV and CMV by immunostaining  -PAS stain negative for fungal elements    D. Small bowel, duodenum, biopsy:  -Duodenal mucosa with no significant histologic abnormality  -Negative for HSV and CMV by immunostaining  -PAS stain negative for fungal elements    E. Stomach, biopsy:  -Gastric  mucosa with no significant inflammation  -No H. Pylori like organisms identified on routine sections  -Negative for intestinal metaplasia or dysplasia  -Negative for HSV, CMV and HHV 8 by immunostaining  -GMS stain negative for fungal elements    F. Esophagus, biopsy:  -Squamous epithelium with superficial ulceration, acute inflammation and  ballooning degeneration  -Negative for HSV and CMV by immunostaining  -PAS stain negative for fungal elements               Past Medical History:   Reviewed and edited as appropriate  Past Medical History:   Diagnosis Date    Acute appendicitis with localized peritonitis 1/31/2018    AIDS (H)     Allergic rhinitis due to other allergen     DNS    Anal dysplasia     Chronic abdominal pain     CNS toxoplasmosis (H)     COVID-19 1/11/2022    Diabetes type 2, controlled (H)     GERD (gastroesophageal reflux disease)     Herpes zoster 9/23/2016    History of seizure     History of substance abuse (H)     HIV (human immunodeficiency virus infection) (H)     HLD (hyperlipidemia)     Lung nodules     Periungual wart     Pneumonia 1/6/2019    PTSD (post-traumatic stress disorder)     Sleep apnea     doesn't use CPAP            Past Surgical History:   Reviewed and edited as appropriate   Past Surgical History:   Procedure Laterality Date    ANOSCOPY N/A 9/9/2020    Procedure: Exam Under Anesthesia, ANOSCOPY, fulgeration of rectal fissures with Rectal Biopsies;  Surgeon: Thanh Lundberg MD;  Location: [facility] OR    COLONOSCOPY Left 1/22/2016    Procedure: COMBINED COLONOSCOPY, SINGLE OR MULTIPLE BIOPSY/POLYPECTOMY BY BIOPSY;  Surgeon: Clark Saini MD;  Location:  GI    ESOPHAGOSCOPY, GASTROSCOPY, DUODENOSCOPY (EGD), COMBINED N/A 6/6/2022    Procedure: ESOPHAGOGASTRODUODENOSCOPY, WITH BIOPSY;  Surgeon: Kecia Benítez MD;  Location:  GI    HC EXPLORE UNDESC TESTIS,INGUIN/SCROTAL      LAPAROSCOPIC APPENDECTOMY N/A 1/31/2018    Procedure: LAPAROSCOPIC APPENDECTOMY;  LAPAROSCOPIC  "APPENDECTOMY;  Surgeon: Dawn Holt MD;  Location: [facility] OR    LAPAROSCOPY DIAGNOSTIC (GENERAL) N/A 7/26/2016    Procedure: LAPAROSCOPY DIAGNOSTIC (GENERAL);  Surgeon: Susannah Arriaga MD;  Location: [facility] OR    LAPAROSCOPY DIAGNOSTIC (GENERAL) N/A 4/16/2018    Procedure: LAPAROSCOPY DIAGNOSTIC (GENERAL);  Diagnostic laparoscopy and lysis of adhesions;  Surgeon: Prince Dowling MD;  Location: [facility] OR    OPTICAL TRACKING SYSTEM CRANIOTOMY, EXCISE TUMOR, COMBINED Left 4/10/2015    Procedure: COMBINED OPTICAL TRACKING SYSTEM CRANIOTOMY, EXCISE TUMOR;  Surgeon: Mirlande Colmenares MD;  Location: [facility] OR    REPAIR GAMEKEEPER'S THUMB Right 12/2/2016    Procedure: REPAIR LIGAMENT ULNAR COLLATERAL THUMB (GAMEKEEPER'S);  Surgeon: Evin Zamorano MD;  Location:  OR    [facility] NONSPECIFIC PROCEDURE      right forearm fracture              Social History:   Patient reports being homeless.     Alcohol: Denies.  Tobacco: Intermittent cigarette smoking.   Illicit drugs: Denies.            Family History:   Patient's family history is reviewed today and is non-contributory    Family History   Problem Relation Age of Onset    Diabetes Brother     Diabetes Father     Alzheimer Disease Father     Unknown/Adopted Mother     Diabetes Paternal Grandfather     Cancer No family hx of         no skin cancer    Skin Cancer No family hx of         no famiy hx of skin cancer    Glaucoma No family hx of     Macular Degeneration No family hx of              Allergies:   Reviewed and edited as appropriate     Allergies   Allergen Reactions    Fentanyl Blisters     Per pt, after taking this medication had blisters develope    Tylenol [Acetaminophen] Itching    Dulaglutide Rash    Insulin Lispro Rash     Patient reported    Penicillin V Other (See Comments) and Rash     Diffuse maculopapular rash + feels \"high\", per pt.             Medications:     Current Facility-Administered Medications   Medication Dose Route Frequency " Provider Last Rate Last Admin    acetaminophen (TYLENOL) tablet 975 mg  975 mg Oral Q8H PRN Sapna Gonzales MD        bictegravir-emtricitabine-tenofovir (BIKTARVY) -25 MG per tablet 1 tablet  1 tablet Oral Daily Sapna Gonzales MD   1 tablet at 10/08/24 0806    calcium carbonate (TUMS) chewable tablet 1,000 mg  1,000 mg Oral 4x Daily PRN Sapna Gonzales MD        glucose gel 15-30 g  15-30 g Oral Q15 Min PRN Sapna Gonzales MD        Or    dextrose 50 % injection 25-50 mL  25-50 mL Intravenous Q15 Min PRN Sapna Gonzales MD        Or    glucagon injection 1 mg  1 mg Subcutaneous Q15 Min PRN Sapna Gonzales MD        doxycycline hyclate (VIBRAMYCIN) capsule 100 mg  100 mg Oral BID Sapna Gonzales MD   100 mg at 10/08/24 0805    famotidine (PEPCID) tablet 40 mg  40 mg Oral Daily Sapna Gonzales MD   40 mg at 10/08/24 0805    insulin aspart (NovoLOG) injection (RAPID ACTING)  1-7 Units Subcutaneous Q4H Sapna Gonzales MD        insulin glargine (LANTUS PEN) injection 14 Units  14 Units Subcutaneous BID Sapna Gonzales MD        lidocaine (LMX4) cream   Topical Q1H PRN Sapna Gonzales MD        lidocaine 1 % 0.1-1 mL  0.1-1 mL Other Q1H PRN Sapna Gonzales MD        senazul-docusate (SENOKOT-S/PERICOLACE) 8.6-50 MG per tablet 1 tablet  1 tablet Oral BID PRN Sapna Gonzales MD        Or    senna-docusate (SENOKOT-S/PERICOLACE) 8.6-50 MG per tablet 2 tablet  2 tablet Oral BID PRN Sapna Gonzales MD        sodium chloride (PF) 0.9% PF flush 3 mL  3 mL Intracatheter Q8H Sapna Gonzales MD        sodium chloride (PF) 0.9% PF flush 3 mL  3 mL Intracatheter q1 min prn Sapna Gonzales MD         Current Outpatient Medications   Medication Sig Dispense Refill    bictegravir-emtricitabine-tenofovir (BIKTARVY) -25 MG per tablet Take 1 tablet by mouth daily 30 tablet 2    blood glucose (NO BRAND SPECIFIED) lancets standard Use to test blood sugar 1 time daily or as  directed. 100 Lancet 4    blood glucose (NO BRAND SPECIFIED) test strip Use to test blood sugar 1 time daily or as directed. 100 strip 4    blood glucose monitoring (NO BRAND SPECIFIED) meter device kit Use to test blood sugar 3 times daily or as directed. 1 kit 0    Cefadroxil (DURICEF) 1 g tablet Take 1 tablet (1 g) by mouth daily for 7 days. 7 tablet 0    Continuous Glucose Sensor (DEXCOM G6 SENSOR) MISC 1 each every 10 days Change every 10 days. 3 each 11    Continuous Glucose Sensor (DEXCOM G7 SENSOR) MISC 1 Device every 10 days. 9 each 3    Continuous Glucose Transmitter (DEXCOM G6 TRANSMITTER) MISC 1 each every 3 months Change every 3 months. 1 each 3    doxycycline hyclate (VIBRAMYCIN) 100 MG capsule Take 1 capsule (100 mg) by mouth 2 times daily for 7 days. 14 capsule 0    famotidine (PEPCID) 40 MG tablet Take 1 tablet (40 mg) by mouth daily 30 tablet 2    glipiZIDE (GLUCOTROL XL) 10 MG 24 hr tablet Take 1 tablet (10 mg) by mouth daily 90 tablet 1    insulin glargine (LANTUS PEN) 100 UNIT/ML pen Inject 27 units subcutaneously 2 times daily to lower blood sugar. Discard pens after they've been out of the fridge for 28 days. Keep at room temperature. 15 mL 1    insulin pen needle (32G X 4 MM) 32G X 4 MM miscellaneous Use 4-5 pen needles daily or as directed. 100 each 2    Nutritional Supplements (ENSURE ACTIVE) LIQD Take 414 mLs by mouth daily 30 mL 11             Review of Systems:     A complete review of systems was performed and is negative except as noted in the HPI           Physical Exam:   Temp: 98.1  F (36.7  C) Temp src: Oral BP: (!) 142/86 Pulse: 89   Resp: 18 SpO2: 100 % O2 Device: None (Room air)    Wt:   Wt Readings from Last 2 Encounters:   08/05/24 68 kg (150 lb)   08/01/24 70.8 kg (156 lb)        General: 58 year old male lying in bed in NAD. Appears comfortable.  Answers appropriately.    HEENT: Head is AT/NC. Sclera anicteric.   Lungs: No increased work of breathing, speaking in full  sentences, equal chest rise. On Room Air.   Heart: Regular rate. Peripheral perfusion intact.  Abdomen: Soft, non-distended, +mild left-sided abdominal tenderness without rebound or guarding. No peritoneal signs.  Extremities: WWP.  Musculoskeletal: No gross deformity.  Skin: No jaundice or rash on exposed skin.  Neurologic: Grossly non-focal.  CN 2-12 grossly intact.   Mental status/Psych: A&O. Asks/answers questions appropriately. Flat affect. Pleasant, interactive.             Data:   LAB WORK:    BMP  Recent Labs   Lab 10/08/24  0802 10/08/24  0603 10/08/24  0601 10/07/24  2058   NA  --   --  136 140   POTASSIUM  --   --  3.8 3.6   CHLORIDE  --   --  103 100   LINDA  --   --  8.6* 9.9   CO2  --   --  23 27   BUN  --   --  14.2 13.7   CR  --   --  0.62* 0.71   * 224* 246* 274*     CBC  Recent Labs   Lab 10/08/24  0738 10/07/24  2058   WBC 7.6 12.2*   RBC 3.99* 5.09   HGB 11.9* 15.1   HCT 35.9* 45.6   MCV 90 90   MCH 29.8 29.7   MCHC 33.1 33.1   RDW 13.0 13.0    467*     INR  Recent Labs   Lab 10/08/24  0601   INR 1.05     LFTs  Recent Labs   Lab 10/08/24  0601 10/07/24  2058   ALKPHOS 93 113   AST 15 20   ALT 12 18   BILITOTAL 0.7 0.8   PROTTOTAL 6.3* 8.2   ALBUMIN 3.4* 4.5      PANC  Recent Labs   Lab 10/07/24  2058   LIPASE 31       IMAGING:    CT ABDOMEN PELVIS W CONTRAST  LOCATION: [facility]  DATE: 10/7/2024     INDICATION: Abdominal pain, vomiting, repeated SBO's, eval for interval change  COMPARISON: 9/28/2024  TECHNIQUE: CT scan of the abdomen and pelvis was performed following injection of IV contrast. Multiplanar reformats were obtained. Dose reduction techniques were used.  CONTRAST: 92ml isovue 370     FINDINGS:   LOWER CHEST: Minimal dependent atelectasis in the posterior lung bases.     HEPATOBILIARY: Normal contour with no significant mass. Stable focal fatty infiltration adjacent to the falciform ligament along the anterior liver. No bile  duct dilatation. Calcified gallstone within the neck of the gallbladder. Gallbladder is   unremarkable.     PANCREAS: No significant mass, duct dilatation, or inflammatory change.     SPLEEN: Normal.     ADRENAL GLANDS: Normal.     KIDNEYS/BLADDER: The bilateral kidneys enhance symmetrically without evidence for hydronephrosis or pyelonephritis. No renal masses or calculi noted. The bilateral ureters and urinary bladder are unremarkable.     BOWEL: Marked fluid-filled and gas-filled stomach with mild thickening of the distal stomach at the pylorus. Clinical correlation for gastric outlet obstruction recommended.  Endoscopic evaluation recommended. There is some mild thickening and mild   enhancement of small bowel in the left lower quadrant. Previously there is an obstruction in this region and this may be postobstructive changes and/or enteritis.     LYMPH NODES: No lymphadenopathy.     VASCULATURE: No abdominal aortic aneurysm.     PELVIC ORGANS: No pelvic masses. Penile implant.     MUSCULOSKELETAL: No concerning osseous abnormalities.                                                                      IMPRESSION:   1.  Marked fluid-filled and gas-filled stomach with mild thickening of the distal stomach at the pylorus. Clinical correlation for gastric outlet obstruction recommended. Endoscopic evaluation recommended.  2.  There is some mild thickening and enhancement of small bowel in the left lower quadrant. Previously there is an obstruction in this region and this may be postobstructive changes and/or enteritis.  3.  Cholelithiasis.        =======================================================================

## 2024-10-08 NOTE — MEDICATION SCRIBE - ADMISSION MEDICATION HISTORY
Medication Scribe Admission Medication History    Admission medication history is complete. The information provided in this note is only as accurate as the sources available at the time of the update.    Information Source(s): Hospital records, Facility (U/NH/) medication list/MAR, and Dispensary Report  via N/A    Pertinent Information: Tired speaking with patient for assessment but patient was falling asleep and dosing off when asked assessment questions. Patient was recently admitted in ED 10/07/2024 and was discharged. Used discharge summary, ED MAR, Dispensary Report and Care EveryWhere to complete medication hx. Patient was prescribed Cefadroxil and Doxycycline during yesterdays visit. Per records the Cefadroxil has been discontinued currently.  No other medication changes at this time.     Changes made to PTA medication list:  Added: None  Deleted: None  Changed: None    Allergies reviewed with patient and updates made in EHR: no    Medication History Completed By: Lyn Villalobos 10/8/2024 1:19 PM    PTA Med List   Medication Sig Last Dose    bictegravir-emtricitabine-tenofovir (BIKTARVY) -25 MG per tablet Take 1 tablet by mouth daily 10/8/2024 at AM    blood glucose (NO BRAND SPECIFIED) lancets standard Use to test blood sugar 1 time daily or as directed. Unknown    blood glucose (NO BRAND SPECIFIED) test strip Use to test blood sugar 1 time daily or as directed. Unknown    blood glucose monitoring (NO BRAND SPECIFIED) meter device kit Use to test blood sugar 3 times daily or as directed. Unknown    Cefadroxil (DURICEF) 1 g tablet Take 1 tablet (1 g) by mouth daily for 7 days. Unknown    Continuous Glucose Sensor (DEXCOM G6 SENSOR) MISC 1 each every 10 days Change every 10 days. Unknown    Continuous Glucose Sensor (DEXCOM G7 SENSOR) MISC 1 Device every 10 days. Unknown    Continuous Glucose Transmitter (DEXCOM G6 TRANSMITTER) MISC 1 each every 3 months Change every 3 months. Unknown     doxycycline hyclate (VIBRAMYCIN) 100 MG capsule Take 1 capsule (100 mg) by mouth 2 times daily for 7 days. 10/8/2024 at AM    famotidine (PEPCID) 40 MG tablet Take 1 tablet (40 mg) by mouth daily 10/8/2024 at AM    glipiZIDE (GLUCOTROL XL) 10 MG 24 hr tablet Take 1 tablet (10 mg) by mouth daily Unknown    insulin glargine (LANTUS PEN) 100 UNIT/ML pen Inject 27 units subcutaneously 2 times daily to lower blood sugar. Discard pens after they've been out of the fridge for 28 days. Keep at room temperature. Unknown    insulin pen needle (32G X 4 MM) 32G X 4 MM miscellaneous Use 4-5 pen needles daily or as directed. Unknown    Nutritional Supplements (ENSURE ACTIVE) LIQD Take 414 mLs by mouth daily Unknown

## 2024-10-09 ENCOUNTER — ANESTHESIA (OUTPATIENT)
Dept: GASTROENTEROLOGY | Facility: CLINIC | Age: 59
End: 2024-10-09
Payer: COMMERCIAL

## 2024-10-09 ENCOUNTER — APPOINTMENT (OUTPATIENT)
Dept: CT IMAGING | Facility: CLINIC | Age: 59
End: 2024-10-09
Attending: PHYSICIAN ASSISTANT
Payer: COMMERCIAL

## 2024-10-09 ENCOUNTER — APPOINTMENT (OUTPATIENT)
Dept: GENERAL RADIOLOGY | Facility: CLINIC | Age: 59
End: 2024-10-09
Attending: STUDENT IN AN ORGANIZED HEALTH CARE EDUCATION/TRAINING PROGRAM
Payer: COMMERCIAL

## 2024-10-09 ENCOUNTER — ANESTHESIA EVENT (OUTPATIENT)
Dept: GASTROENTEROLOGY | Facility: CLINIC | Age: 59
End: 2024-10-09
Payer: COMMERCIAL

## 2024-10-09 LAB
GLUCOSE BLDC GLUCOMTR-MCNC: 119 MG/DL (ref 70–99)
GLUCOSE BLDC GLUCOMTR-MCNC: 266 MG/DL (ref 70–99)
GLUCOSE BLDC GLUCOMTR-MCNC: 267 MG/DL (ref 70–99)

## 2024-10-09 PROCEDURE — 250N000013 HC RX MED GY IP 250 OP 250 PS 637

## 2024-10-09 PROCEDURE — 99232 SBSQ HOSP IP/OBS MODERATE 35: CPT | Performed by: STUDENT IN AN ORGANIZED HEALTH CARE EDUCATION/TRAINING PROGRAM

## 2024-10-09 PROCEDURE — 120N000002 HC R&B MED SURG/OB UMMC

## 2024-10-09 PROCEDURE — 74177 CT ABD & PELVIS W/CONTRAST: CPT | Mod: 26 | Performed by: STUDENT IN AN ORGANIZED HEALTH CARE EDUCATION/TRAINING PROGRAM

## 2024-10-09 PROCEDURE — 74018 RADEX ABDOMEN 1 VIEW: CPT | Mod: 26 | Performed by: RADIOLOGY

## 2024-10-09 PROCEDURE — 74018 RADEX ABDOMEN 1 VIEW: CPT

## 2024-10-09 PROCEDURE — 250N000011 HC RX IP 250 OP 636: Performed by: PHYSICIAN ASSISTANT

## 2024-10-09 PROCEDURE — 250N000009 HC RX 250: Performed by: STUDENT IN AN ORGANIZED HEALTH CARE EDUCATION/TRAINING PROGRAM

## 2024-10-09 PROCEDURE — 99232 SBSQ HOSP IP/OBS MODERATE 35: CPT

## 2024-10-09 PROCEDURE — 999N000128 HC STATISTIC PERIPHERAL IV START W/O US GUIDANCE

## 2024-10-09 PROCEDURE — 74177 CT ABD & PELVIS W/CONTRAST: CPT

## 2024-10-09 PROCEDURE — 258N000003 HC RX IP 258 OP 636: Performed by: STUDENT IN AN ORGANIZED HEALTH CARE EDUCATION/TRAINING PROGRAM

## 2024-10-09 PROCEDURE — 250N000012 HC RX MED GY IP 250 OP 636 PS 637

## 2024-10-09 RX ORDER — NALOXONE HYDROCHLORIDE 0.4 MG/ML
0.1 INJECTION, SOLUTION INTRAMUSCULAR; INTRAVENOUS; SUBCUTANEOUS
Status: CANCELLED | OUTPATIENT
Start: 2024-10-09

## 2024-10-09 RX ORDER — HYDROMORPHONE HYDROCHLORIDE 1 MG/ML
0.5 INJECTION, SOLUTION INTRAMUSCULAR; INTRAVENOUS; SUBCUTANEOUS ONCE
Status: COMPLETED | OUTPATIENT
Start: 2024-10-09 | End: 2024-10-09

## 2024-10-09 RX ORDER — ONDANSETRON 2 MG/ML
4 INJECTION INTRAMUSCULAR; INTRAVENOUS EVERY 30 MIN PRN
Status: CANCELLED | OUTPATIENT
Start: 2024-10-09

## 2024-10-09 RX ORDER — ONDANSETRON 4 MG/1
4 TABLET, ORALLY DISINTEGRATING ORAL EVERY 30 MIN PRN
Status: CANCELLED | OUTPATIENT
Start: 2024-10-09

## 2024-10-09 RX ORDER — DEXAMETHASONE SODIUM PHOSPHATE 4 MG/ML
4 INJECTION, SOLUTION INTRA-ARTICULAR; INTRALESIONAL; INTRAMUSCULAR; INTRAVENOUS; SOFT TISSUE
Status: CANCELLED | OUTPATIENT
Start: 2024-10-09

## 2024-10-09 RX ORDER — ONDANSETRON 2 MG/ML
4 INJECTION INTRAMUSCULAR; INTRAVENOUS EVERY 6 HOURS PRN
Status: DISCONTINUED | OUTPATIENT
Start: 2024-10-09 | End: 2024-10-10 | Stop reason: HOSPADM

## 2024-10-09 RX ORDER — SODIUM CHLORIDE 9 MG/ML
INJECTION, SOLUTION INTRAVENOUS CONTINUOUS
Status: ACTIVE | OUTPATIENT
Start: 2024-10-09 | End: 2024-10-10

## 2024-10-09 RX ORDER — HYDROMORPHONE HCL IN WATER/PF 6 MG/30 ML
0.2 PATIENT CONTROLLED ANALGESIA SYRINGE INTRAVENOUS EVERY 5 MIN PRN
Status: CANCELLED | OUTPATIENT
Start: 2024-10-09

## 2024-10-09 RX ORDER — IOPAMIDOL 755 MG/ML
92 INJECTION, SOLUTION INTRAVASCULAR ONCE
Status: COMPLETED | OUTPATIENT
Start: 2024-10-09 | End: 2024-10-09

## 2024-10-09 RX ORDER — HYDROMORPHONE HCL IN WATER/PF 6 MG/30 ML
0.4 PATIENT CONTROLLED ANALGESIA SYRINGE INTRAVENOUS EVERY 5 MIN PRN
Status: CANCELLED | OUTPATIENT
Start: 2024-10-09

## 2024-10-09 RX ADMIN — FAMOTIDINE 40 MG: 20 TABLET ORAL at 09:10

## 2024-10-09 RX ADMIN — IOPAMIDOL 92 ML: 755 INJECTION, SOLUTION INTRAVENOUS at 23:30

## 2024-10-09 RX ADMIN — INSULIN ASPART 3 UNITS: 100 INJECTION, SOLUTION INTRAVENOUS; SUBCUTANEOUS at 12:40

## 2024-10-09 RX ADMIN — PANTOPRAZOLE SODIUM 40 MG: 40 INJECTION, POWDER, FOR SOLUTION INTRAVENOUS at 12:40

## 2024-10-09 RX ADMIN — BICTEGRAVIR SODIUM, EMTRICITABINE, AND TENOFOVIR ALAFENAMIDE FUMARATE 1 TABLET: 50; 200; 25 TABLET ORAL at 09:10

## 2024-10-09 RX ADMIN — ACETAMINOPHEN 975 MG: 325 TABLET, FILM COATED ORAL at 09:10

## 2024-10-09 RX ADMIN — INSULIN GLARGINE 14 UNITS: 100 INJECTION, SOLUTION SUBCUTANEOUS at 09:12

## 2024-10-09 RX ADMIN — PANTOPRAZOLE SODIUM 40 MG: 40 INJECTION, POWDER, FOR SOLUTION INTRAVENOUS at 09:58

## 2024-10-09 RX ADMIN — INSULIN GLARGINE 14 UNITS: 100 INJECTION, SOLUTION SUBCUTANEOUS at 21:34

## 2024-10-09 RX ADMIN — HYDROMORPHONE HYDROCHLORIDE 0.5 MG: 1 INJECTION, SOLUTION INTRAMUSCULAR; INTRAVENOUS; SUBCUTANEOUS at 22:45

## 2024-10-09 RX ADMIN — INSULIN ASPART 3 UNITS: 100 INJECTION, SOLUTION INTRAVENOUS; SUBCUTANEOUS at 17:23

## 2024-10-09 RX ADMIN — ONDANSETRON 4 MG: 2 INJECTION INTRAMUSCULAR; INTRAVENOUS at 22:58

## 2024-10-09 RX ADMIN — SODIUM CHLORIDE: 9 INJECTION, SOLUTION INTRAVENOUS at 13:43

## 2024-10-09 RX ADMIN — DOXYCYCLINE 100 MG: 100 CAPSULE ORAL at 09:11

## 2024-10-09 RX ADMIN — DOXYCYCLINE 100 MG: 100 CAPSULE ORAL at 21:34

## 2024-10-09 RX ADMIN — INSULIN GLARGINE 14 UNITS: 100 INJECTION, SOLUTION SUBCUTANEOUS at 00:48

## 2024-10-09 ASSESSMENT — ACTIVITIES OF DAILY LIVING (ADL)
ADLS_ACUITY_SCORE: 37
DEPENDENT_IADLS:: INDEPENDENT
ADLS_ACUITY_SCORE: 37

## 2024-10-09 NOTE — PLAN OF CARE
Goal Outcome Evaluation:      Plan of Care Reviewed With: patient    Overall Patient Progress: no changeOverall Patient Progress: no change    Outcome Evaluation: Pt A&Ox4. VSS on RA. Pain 9/10 in LLQ, IV dilaudid given with relief. Pt refused to do admission questions and 2 RN skin check, said to come back in the morning. Also refused BG check, pt stated we are waking him up too much. Has call light within reach, bed in low position, no acute changes overnight.

## 2024-10-09 NOTE — PROGRESS NOTES
[facility]    Medicine Progress Note - Hospitalist Service, GOLD TEAM 7    Date of Admission:  10/7/2024    Assessment & Plan      Andrew Collier is a 58 year old male admitted on 10/7/2024. He has a history of GERD, HIV, DM2, MDD, DAVID, methamphetamine abuse, and recurrent SBO who presents with abdominal pain, nausea, and vomiting. He had previously been seen on 9/28 and now represents for similar concerns of SBO vs gastric outlet obstruction.        Today's Plan:  --- Patient ate pudding which he brought in his backpack. GI cancelled EGD and planned for tomorrow 10/10. Will need to keep NPO until then.  --- if acute worsening of abd pain or nausea, will need to place NGT to suction, obtain ct abd pelvis and have gen surg evaluate    SBO vs gastric outlet obstruction  Patient with nausea, vomiting, and abdominal pain in the left lower quadrent that started around 3 PM.CT abdomen pelvis showed mild thickening and enhancement of small bowel in left lower quadrant where previously an obstruction had been.  Fluid-filled and gas-filled stomach also seen concerning for gastric outlet obstruction.  -GI consulted, see their note for res  -Pending GI consult consider  -NPO for now  - BID PPI  - NGT with suction  -Pain Tylenol (unclear if allergy itching is true allergy), can consider IV dilaudid overnight if pain poorly controlled.  - Avoid NSAIDS  -If abd pain, nausea, emesis worsens-- obtain recheck CT abd/pelvis with gen surgery consult  - If after EGD pain fails to resolve, would need to obtain gen surg consult for likely SBO.      Infection of the left toe  Leukocytosis  Per patient he has a infection on his left big toe and had been prescribed doxycycline that he started taking today.  -Continue doxycycline 100 Mg twice daily       HIV   Follows with Dr. Ivory, last visit 7/2024.  CD4 count found to be 467 at that time  -Continue PTA Biktarvy     DM2 (Hgb A1c  11.7%)   PTA managed with Lantus 27units BID and glipizide 10mg daily.   -hold PTA glipizide   - continue lower dose of Lantus dose at 14 units BID in setting of n.p.o. status  -Hypoglycemia protocol  -Medium sliding scale insulin    HTN  Noted problem in patients problem list. Not clear if patient currently takes any medication. In setting of DM2 can consider ACE or ARB.  -Consider ACE or ARB in AM      GERD  -PTA famotidine                Diet: NPO per Anesthesia Guidelines for Procedure/Surgery Except for: Meds  NPO for Medical/Clinical Reasons Except for: Meds    DVT Prophylaxis: Pneumatic Compression Devices  Potts Catheter: Not present  Lines: None     Cardiac Monitoring: None  Code Status: Full Code      Clinically Significant Risk Factors           # Hypocalcemia: Lowest Ca = 8.6 mg/dL in last 2 days, will monitor and replace as appropriate     # Hypoalbuminemia: Lowest albumin = 3.4 g/dL at 10/8/2024  6:01 AM, will monitor as appropriate              # DMII: A1C = 11.7 % (Ref range: <5.7 %) within past 6 months         # Financial/Environmental Concerns: unemployed  # Support System: poor social support noted in nursing assessment  # Housing Instability: noted in nursing assessment         Disposition Plan     Medically Ready for Discharge: Anticipated in 2-4 Days             TIBURCIO LONG MD  Hospitalist Service, [facility] TEAM [address] [place] [address]  Securely message with Shareablee (more info)  Text page via [facility] Paging/Directory   See signed in provider for up to date coverage information  ______________________________________________________________________    Interval History   Still having abd pain. No nausea or emesis right now. This morning noted to have eaten pudding which he brought into the hospital. Now passing gas. Urinating normally. No dysuria, flank pain. Afebrile.    Physical Exam   Vital Signs: Temp: 97.9  F (36.6  C) Temp src: Oral BP: 131/83 Pulse:  77   Resp: 18 SpO2: 100 % O2 Device: None (Room air)    Weight: 0 lbs 0 oz    General Appearance: Awake, alert, interactive. In NAD  Respiratory: CTAB  Cardiovascular: Normal S1, S2.  GI: Abd soft. No rebound. Ttp over the Left abd  Skin: no rashes     Medical Decision Making       55 MINUTES SPENT BY ME on the date of service doing chart review, history, exam, documentation & further activities per the note.      Data         Imaging results reviewed over the past 24 hrs:   Recent Results (from the past 24 hour(s))   XR Abdomen Port 1 View    Narrative    Exam: XR ABDOMEN PORT 1 VIEW, 10/9/2024 10:21 AM    Indication: GOO and concern for sbo on last ct.    Comparison: 10/7/2024 CT, 9/30/2024 abdominal radiographs    Findings:   Supine frontal views of the abdomen. Scattered air-filled, nondilated  loops of small and large bowel. No appreciable pneumatosis or portal  venous gas. Mild colonic stool burden. Partially visualized penile  prosthesis. The lung bases are clear. No acute osseous abnormality.      Impression    Impression: Nonobstructive bowel gas pattern.    JOSE VITAL DO         SYSTEM ID:  [id number]

## 2024-10-09 NOTE — CONSULTS
Care Management Initial Consult    General Information  Assessment completed with: Patient, Patient  Type of CM/SW Visit: Initial Assessment  Primary Care Provider verified and updated as needed: Yes (No PCP)   Readmission within the last 30 days: lack of support, previous discharge plan unsuccessful   Reason for Consult: discharge planning, community resources, housing concerns/homeless  Advance Care Planning: N/A         Communication Assessment  Patient's communication style: spoken language (English or Bilingual)      Cognitive  Cognitive/Neuro/Behavioral: WDL                      Living Environment:   People in home: N/A  Current living Arrangements: homeless      Able to return to prior arrangements: yes  Living Arrangement Comments: Plans to return to streets    Family/Social Support:  Care provided by: self  Provides care for: no one  Marital Status: Single  Support system: None          Description of Support System:      Support Assessment: Limited social contact and support, Lacks adequate physical care, Lacks adequate emotional support    Current Resources:   Patient receiving home care services: N/A    Community Resources: EBT  Equipment currently used at home:  N/A  Supplies currently used at home: N/A    Employment/Financial:  Employment Status: unemployed     Financial Concerns: unemployed   Referral to Financial Worker: No       Does the patient's insurance plan have a 3 day qualifying hospital stay waiver?  No    Lifestyle & Psychosocial Needs:  Social Determinants of Health     Food Insecurity: Not on file   Depression: Not at risk (8/27/2020)    PHQ-2     PHQ-2 Score: 0   Housing Stability: Not on file   Tobacco Use: High Risk (7/25/2024)    Patient History     Smoking Tobacco Use: Some Days     Smokeless Tobacco Use: Former     Passive Exposure: Not on file   Financial Resource Strain: High Risk (12/28/2021)    [facility]    Financial Resource Strain     Difficulty of Paying Living Expenses: Not on file     Difficulty of Paying Living Expenses: Not on file   Alcohol Use: Unknown (8/27/2020)    AUDIT-C     Frequency of Alcohol Consumption: Not on file     Average Number of Drinks: Patient declined     Frequency of Binge Drinking: Not on file   Transportation Needs: Not on file   Physical Activity: Not on file   Interpersonal Safety: Not on file   Stress: Not on file   Social Connections: Unknown (4/10/2023)    Received from [facility], [facility]    Social Connections     Frequency of Communication with Friends and Family: Not on file   Health Literacy: Not on file       Functional Status:  Prior to admission patient needed assistance:   Dependent ADLs:: Independent  Dependent IADLs:: Independent  Assesssment of Functional Status: At functional baseline    Mental Health Status:  Mental Health Status: Past Concern       Chemical Dependency Status:  Chemical Dependency Status: Past Concern             Values/Beliefs:  Spiritual, Cultural Beliefs, Orthodoxy Practices, Values that affect care: N/A              Discussed  Partnership in Safe Discharge Planning  document with patient/family: No    Additional Information:  Per [facility] Provider, pt is not yet medically ready for discharge, with anticipated med readiness for discharge in 2 days. Writer and SW met with pt at bedside to complete CMA. SW confirmed demographic information and asked about blank demographic information. (address, PCP, phone number, emergency contacts, insurance). Pt reported that he does not own a cell phone and does not have emergency contacts. Per assessment, pt believes he has PCP through [facility] and agreed to SW setting up an op appointment with PCP -- per chart review, PCP not established and pt without care from op providers.   SW discussed living arrangements with pt. Pt  said that he is homeless and lives on the streets. SW asked if pt had a tent, which he denied but asked SW if we could get him one. Pt reported that he had not sought out a shelter, but asked SW about [facility] as an option that we could assist him with.  SW discussed supports with pt. Pt confirmed that he does get EBT through [place]. Pt said he does not have a [facility]/ but would like to have one. SW told pt that this is challenging due to him not having a phone for SW/CM to contact him through.      SW discussed basic needs with pt. As mentioned, pt does not have a phone--SW will gather phone access resources. Pt also asked about durable shoes.     15:18 -- SW student called [facility] DAYNA Ortega to inquire about homelessness resources. Jordna referred SW student to reach out to [facility], research shelter options via [facility] and [facility], and also suggested [facility] .     Next Steps: SW will continue to follow, research homelessness and phone resources given by Jordan and develop resource list for pt prior to discharge.     _____________________________________    Keke Gonzalez MSWY2 Student  [facility]-  Phone: [phone]  Social Work & Care Management Department   _____________________________________    This case is being supervised by Clinical Social Workers RADHA Kamara, GIACOMOSW and/or RADHA Larson, LGSW

## 2024-10-09 NOTE — ANESTHESIA PREPROCEDURE EVALUATION
Anesthesia Pre-Procedure Evaluation    Patient: Andrew Collier   MRN: [id number] : 1965        Procedure : Procedure(s):  Esophagoscopy, gastroscopy, duodenoscopy (EGD), combined            Andrew Collier is a 58 year old male admitted on 10/7/2024. He has a history of GERD, HIV, DM2, MDD, DAVID, methamphetamine abuse, and recurrent SBO who presents with abdominal pain, nausea, and vomiting. He had previously been seen on  and now represents for similar concerns of SBO vs gastric outlet obstruction.          SBO vs gastric outlet obstruction  Patient with nausea, vomiting, and abdominal pain in the left lower quadrent that started around 3 PM.CT abdomen pelvis showed mild thickening and enhancement of small bowel in left lower quadrant where previously an obstruction had been.  Fluid-filled and gas-filled stomach also seen concerning for gastric outlet obstruction.  -GI consulted in ED appreciating recs;  -Pending GI consult consider  -NPO for now can consider advancing diet as tolerated in a.m.  -Pain Tylenol (unclear if allergy itching is true allergy), can consider IV dilaudid in AM  -Consider scheduling bowel regiment once diet is advanced        Infection of the left toe  Leukocytosis  Per patient he has a infection on his left big toe and had been prescribed doxycycline that he started taking today.  -Continue doxycycline 100 Mg twice daily        HIV   Follows with Dr. Ivory, last visit 2024.  CD4 count found to be 467 at that time  -Continue PTA Biktarvy     DM2 (Hgb A1c 11.7%)   PTA managed with Lantus 27units BID and glipizide 10mg daily.   -hold PTA glipizide   -reduce Lantus dose to 14 units BID in setting of n.p.o. status  -Hypoglycemia protocol  -Medium sliding scale insulin     HTN  Noted problem in patients problem list. Not clear if patient currently takes any medication. In setting of DM2 can consider ACE or ARB.  -Consider ACE or ARB in AM    Past Medical History:    Diagnosis Date     Acute appendicitis with localized peritonitis 1/31/2018     AIDS (H)      Allergic rhinitis due to other allergen     DNS     Anal dysplasia      Chronic abdominal pain      CNS toxoplasmosis (H)      COVID-19 1/11/2022     Diabetes type 2, controlled (H)      GERD (gastroesophageal reflux disease)      Herpes zoster 9/23/2016     History of seizure      History of substance abuse (H)      HIV (human immunodeficiency virus infection) (H)      HLD (hyperlipidemia)      Lung nodules      Periungual wart      Pneumonia 1/6/2019     PTSD (post-traumatic stress disorder)      Sleep apnea     doesn't use CPAP      Past Surgical History:   Procedure Laterality Date     ANOSCOPY N/A 9/9/2020    Procedure: Exam Under Anesthesia, ANOSCOPY, fulgeration of rectal fissures with Rectal Biopsies;  Surgeon: Thanh Lundberg MD;  Location: [facility]     COLONOSCOPY Left 1/22/2016    Procedure: COMBINED COLONOSCOPY, SINGLE OR MULTIPLE BIOPSY/POLYPECTOMY BY BIOPSY;  Surgeon: Clark Saini MD;  Location: [facility]     ESOPHAGOSCOPY, GASTROSCOPY, DUODENOSCOPY (EGD), COMBINED N/A 6/6/2022    Procedure: ESOPHAGOGASTRODUODENOSCOPY, WITH BIOPSY;  Surgeon: Kecia Benítez MD;  Location: [facility]     HC EXPLORE UNDESC TESTIS,INGUIN/SCROTAL       LAPAROSCOPIC APPENDECTOMY N/A 1/31/2018    Procedure: LAPAROSCOPIC APPENDECTOMY;  LAPAROSCOPIC APPENDECTOMY;  Surgeon: Dawn Holt MD;  Location: [facility]     LAPAROSCOPY DIAGNOSTIC (GENERAL) N/A 7/26/2016    Procedure: LAPAROSCOPY DIAGNOSTIC (GENERAL);  Surgeon: Susannah Arriaga MD;  Location: [facility]     LAPAROSCOPY DIAGNOSTIC (GENERAL) N/A 4/16/2018    Procedure: LAPAROSCOPY DIAGNOSTIC (GENERAL);  Diagnostic laparoscopy and lysis of adhesions;  Surgeon: Prince Dowling MD;  Location: [facility]     OPTICAL TRACKING SYSTEM CRANIOTOMY, EXCISE TUMOR, COMBINED Left 4/10/2015    Procedure: COMBINED OPTICAL TRACKING SYSTEM CRANIOTOMY, EXCISE TUMOR;  Surgeon: Mirlande Colmenares  "MD Opal;  Location: [facility] OR     REPAIR GAMEKEEPER'S THUMB Right 12/2/2016    Procedure: REPAIR LIGAMENT ULNAR COLLATERAL THUMB (GAMEKEEPER'S);  Surgeon: Evin Zamorano MD;  Location:  OR     [facility] NONSPECIFIC PROCEDURE      right forearm fracture      Allergies   Allergen Reactions     Fentanyl Blisters     Per pt, after taking this medication had blisters develope     Tylenol [Acetaminophen] Itching     Dulaglutide Rash     Insulin Lispro Rash     Patient reported     Penicillin V Other (See Comments) and Rash     Diffuse maculopapular rash + feels \"high\", per pt.       Social History     Tobacco Use     Smoking status: Some Days     Current packs/day: 0.25     Average packs/day: 0.3 packs/day for 40.0 years (10.0 ttl pk-yrs)     Types: Cigarettes     Smokeless tobacco: Former   Substance Use Topics     Alcohol use: No     Alcohol/week: 0.0 standard drinks of alcohol     Comment: Last etoh in 2007      Wt Readings from Last 1 Encounters:   08/05/24 68 kg (150 lb)        Anesthesia Evaluation            ROS/MED HX  ENT/Pulmonary:     (+) sleep apnea,                                       Neurologic:       Cardiovascular:     (+)  hypertension- -   -  - -                                      METS/Exercise Tolerance:     Hematologic:       Musculoskeletal:       GI/Hepatic:     (+) GERD, Symptomatic, esophageal disease,                 Renal/Genitourinary:       Endo: Comment:  Lantus 27units BID and glipizide 10mg daily.     (+)  type II DM, Last HgA1c: 11.7,  Using insulin, - not using insulin pump.  Previously admitted for DM/DKA.              Psychiatric/Substance Use:     (+) psychiatric history anxiety and depression   Recreational drug usage: Meth.    Infectious Disease:     (+) Recent Fever,    HIV,       Malignancy:       Other:            Physical Exam    Airway        Mallampati: II   TM distance: > 3 FB   Neck ROM: full   Mouth opening: > 3 cm    Respiratory Devices and Support         Dental    "    (+) Multiple visibly decayed, broken teeth    B=Bridge, C=Chipped, L=Loose, M=Missing    Cardiovascular          Rhythm and rate: regular and normal     Pulmonary           breath sounds clear to auscultation         OUTSIDE LABS:  CBC:   Lab Results   Component Value Date    WBC 7.6 10/08/2024    WBC 12.2 (H) 10/07/2024    HGB 11.9 (L) 10/08/2024    HGB 15.1 10/07/2024    HCT 35.9 (L) 10/08/2024    HCT 45.6 10/07/2024     10/08/2024     (H) 10/07/2024     BMP:   Lab Results   Component Value Date     10/08/2024     10/07/2024    POTASSIUM 3.8 10/08/2024    POTASSIUM 3.6 10/07/2024    CHLORIDE 103 10/08/2024    CHLORIDE 100 10/07/2024    CO2 23 10/08/2024    CO2 27 10/07/2024    BUN 14.2 10/08/2024    BUN 13.7 10/07/2024    CR 0.62 (L) 10/08/2024    CR 0.71 10/07/2024     (H) 10/09/2024     (H) 10/09/2024     COAGS:   Lab Results   Component Value Date    PTT 30 11/01/2023    INR 1.05 10/08/2024     POC:   Lab Results   Component Value Date     (H) 03/10/2021     HEPATIC:   Lab Results   Component Value Date    ALBUMIN 3.4 (L) 10/08/2024    PROTTOTAL 6.3 (L) 10/08/2024    ALT 12 10/08/2024    AST 15 10/08/2024    ALKPHOS 93 10/08/2024    BILITOTAL 0.7 10/08/2024     OTHER:   Lab Results   Component Value Date    PH 7.39 12/23/2022    LACT 2.0 10/07/2024    A1C 11.7 (H) 07/13/2024    LINDA 8.6 (L) 10/08/2024    PHOS 2.4 (L) 07/15/2024    MAG 1.9 07/29/2024    LIPASE 31 10/07/2024    AMYLASE 50 08/24/2021    TSH 0.87 09/25/2020    T4 1.02 04/14/2015    CRP 5.6 02/19/2022    SED 10 12/19/2020       Anesthesia Plan    ASA Status:  3, emergent    NPO Status:  ELEVATED Aspiration Risk/Unknown    Anesthesia Type: General.     - Airway: ETT   Induction: RSI.   Maintenance: Balanced.        Consents    Anesthesia Plan(s) and associated risks, benefits, and realistic alternatives discussed. Questions answered and patient/representative(s) expressed understanding.     -  Discussed: Risks, Benefits and Alternatives for BOTH SEDATION and the PROCEDURE were discussed     - Discussed with:  Patient      - Extended Intubation/Ventilatory Support Discussed: No.      - Patient is DNR/DNI Status: No     Use of blood products discussed: No .     Postoperative Care       PONV prophylaxis: Ondansetron (or other 5HT-3), Dexamethasone or Solumedrol     Comments:               Osbaldo Corrales MD    I have reviewed the pertinent notes and labs in the chart from the past 30 days and (re)examined the patient.  Any updates or changes from those notes are reflected in this note.       # Hypocalcemia: Lowest Ca = 8.6 mg/dL in last 2 days, will monitor and replace as appropriate     # Hypoalbuminemia: Lowest albumin = 3.4 g/dL at 10/8/2024  6:01 AM, will monitor as appropriate              # DMII: A1C = 11.7 % (Ref range: <5.7 %) within past 6 months       # Financial/Environmental Concerns:

## 2024-10-09 NOTE — PLAN OF CARE
Goal Outcome Evaluation:    A&Ox4. Refused cares and non compliance. Refused vitals and blood sugar check this morning. Reminded Pt not to eat at beginning of shift, but Pt reported to this writer at 11:45am that he ate pudding that was in his back pack because he was hungry. Educated Pt again that he needs to remain strict NPO except meds. EGD cancelled for today and rescheduled for tomorrow at 1pm. Started IV fluid NS at 100ml/hr via L PIV. Voiding spontaneously. No BM. Still need stool sample for H Pylori. Gave tylenol for abdominal pain. Denies nausea. Up in room independently

## 2024-10-09 NOTE — PROGRESS NOTES
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      The GI Luminal Service is consulted regarding concern for gastric outlet obstruction.         # Gastric Outlet Obstruction  # Abnormal CT Scan of the GI Tract  CT Abdomen/Pelvis with Contrast on 10/7/2024 reports marked fluid-filled and gas-filled stomach with mild thickening of the distal stomach at the pylorus, concerning for peptic ulcer versus malignant ulceration at the pylorus leading gastric outlet obstruction and subsequent stomach dilation. On admission, recommended placing and nasogastric (NG) tube to low intermittent suction (LIS) for gastric decompression. Patient declined NGT.     Stomach distention appears improved on repeat AXR today 10/9 though assessment limited by plain film. Given stomach distention improvement, planned to perform EGD today given patient reported being strict NPO today. Bedside SENG Chance notified GI team that patient was observed eating pudding from personal backpack this morning at 0800 and is therefore not strict NPO and procedure cannot be completed today per anesthesia guidelines.      Given concern for gastric outlet obstruction, patient is to remain  strict NPO pending EGD assessment tomorrow. Plan for EGD on Thursday 10/11/2024 with the assistance of Anesthesia for sedation for further evaluation for the mild thickening at the gastric pylorus.         # Left-Sided Abdominal Pain  # Recurrent Small Bowel Obstructions  CT Abdomen/Pelvis with Contrast on 10/7/2024 reports some mild thickening and enhancement of the small bowel in the left lower quadrant noting previously there is an obstruction in the region so may be post-obstructive changes and/or enteritis, cholelithiasis. Patient with ongoing left-sided abdominal pain and reportedly no bowel movement or flatus in 2 days. Unclear whether patient currently has ongoing partial small bowel obstruction. Favor continuing supportive cares and conservative management with NG tube to LIS pending further evaluation of Gastric Outlet Obstruction via EGD on Thursday. Future considerations after endoscopic evaluation of the gastric outlet obstruction would be consideration General Surgery consult for potential partial small bowel obstruction and prior to advancing diet, consider Gastrografin Upper GI Series with Small Bowel Follow-Through to assess the small bowel area with enhancement.       # Methamphetamine Abuse  Patient denied prior to admission amphetamine use. 10/8/2024 Urine Drug Screen POSITIVE for Amphetamines. Methamphetamines intoxication versus withdrawal could lead to non-specific symptoms of nausea with emesis and abdominal pain as well. Consider Addiction Medicine consult.        Recommendations:  -- Continue strict NPO status given concern for gastric outlet obstruction.   -- Plan for EGD on Thursday 10/11/2024 with Anesthesia for further evaluation.  - Continue NPO status on 10/11/2024 for EGD.     -- Continue IV PPI 40 mg BID.      -- Collect H. Pylori stool antigen.      -- Strict documentation of rectal output.  - Appreciate detailed documentation of stool appearance, including color, consistency,  frequency and amount.   - Consider smearing stool thinly on white paper towel to distinguish color.      -- Maintain 2 large bore IVs, 18G or larger.  -- Continue Supportive Cares  - Adequate volume resuscitation with IVF, pRBCs.  - Monitor Hemoglobin closely. Transfuse to keep Hgb > 7 g/dL from GI standpoint.   - Monitor Platelets closely. Keep PLT > 50 10e3/uL from GI standpoint.  - Maintain hemodynamics: MAP >65 mmHg and HR <100.  - Monitor and optimize electrolytes.  - Reposition every 30 minutes while awake.  -- Avoid NSAIDs.  -- Analgesics/Antiemetics per primary team.  -- If the patient experiences overt GI bleeding with hemodynamic instability, please page the GI Luminal Service (listed on [facility]).         Outpatient:  -- TBD pending hospital course.        COVID status: not tested this admission.     Discussed with Dr. Lavell Loyola - Primary Medicine Team.    Thank you for involving us in this patient's care. Please do not hesitate to contact the GI service with any questions or concerns.     The patient was discussed and plan agreed upon with Dr. Preet Bhatti, GI Luminal Service staff physician.    Overall time spent on the date of this encounter preparing to see the patient (including chart review of available notes, clinical status events, imaging and labs); discussing, ordering, coordinating recommended medications, tests or procedures; communicating with other health care professionals; and documenting the above clinical information in the electronic medical record was 45 minutes.    Caitie Del Cid PA-C  Inpatient Gastroenterology Service  [facility]  Text Page  _______________________________________________________________      Subjective: Nursing notes and 24hr events reviewed.     Patient seen and examined at 1100.     Patient reports nothing to eat or drink today despite crumbs of esau cracker on the bed on personal clothing.     Denies nausea, vomiting, acid  reflux, heartburn or abdominal pain.     Discussed completing EGD today and patient is amenable.     Received notification from bedside RN that patient was observed eating pudding this morning at around 0800 that patient reportedly had in backpack located in the room.       ROS:   4 pt ROS negative unless noted in subjective.     Objective:  Blood pressure 131/80, pulse 73, temperature 98.3  F (36.8  C), temperature source Oral, resp. rate 18, SpO2 99%.    General: 58 year old male lying in bed in NAD. Appears comfortable.  Answers appropriately. +esau cracker crumbs on the bed and patient's personal shirt. Patient with small bag in bed and backpack on the window sill.   HEENT: Head is AT/NC. Sclera anicteric.   Lungs: No increased work of breathing, speaking in full sentences, equal chest rise. On Room Air.   Heart: Regular rate. Peripheral perfusion intact.  Abdomen: Soft, non-tender, non-distended. No peritoneal signs.  Extremities: WWP.  Musculoskeletal: No gross deformity.  Skin: No jaundice or rash on exposed skin.  Neurologic: Grossly non-focal.  CN 2-12 grossly intact.   Mental status/Psych: A&O. Asks/answers questions appropriately. Flat Affect. +minimally interactive.      Previous Procedures:     6/6/2022 - EGD - Dr. Kecia Benítez  Indications:           Suspected gastric outlet obstruction   Patient Profile:       This is a 54 year old male with a PMH of HIV                          infection, recurrent small bowel obstruction is seen                          by GI for an EGD to exclude GOO.   Findings:       LA Grade B (one or more mucosal breaks greater than 5 mm, not extending        between the tops of two mucosal folds) esophagitis with no bleeding was        found.        A small hiatal hernia was present.        There is no endoscopic evidence of bleeding, erythema or inflammatory        changes suggestive of gastritis, mucosal abnormalities or ulceration in        the entire examined stomach.         The examined duodenum was normal. Biopsies were taken with a cold        forceps for histology.                                                                                    Moderate Sedation:        Moderate (conscious) sedation was administered by the endoscopy nurse        and supervised by the endoscopist. The patient's oxygen saturation,        heart rate, blood pressure and response to care were monitored. Total        physician intraservice time was 18 minutes.   Impression:            - LA Grade B reflux esophagitis with no bleeding.                          - Small hiatal hernia.                          - Normal examined duodenum. Biopsied.           Final Diagnosis   A.  DUODENUM, BIOPSY:  Acute duodenitis; uncertain etiology; no evidence of celiac disease  (See Comment)     B.  STOMACH, ANTRUM, BIOPSY:  Gastric antral & fundic mucosa with fundic PPI-related changes; no significant inflammation; negative for H. pylori, intestinal metaplasia, or dysplasia       Electronically signed by Bhavani Ulloa MD on 6/7/2022 at 12:45 PM   Comment   UUMAYO   Duodenitis etiology is uncertain but although an infective agent is not identified, if possible additional stool examination to rule out entities such as Giardia and others should be considered, as well as excluding medication and/or peptic related sources.            9/9/2020 - Examination under anesthesia with high-definition anal microscopy, biopsy and fulguration and anal dysplasia - Colorectal Surgery Dr. Thanh Lundberg  Procedure Date: 09/09/2020      PREOPERATIVE DIAGNOSIS:  Anal dysplasia.      POSTOPERATIVE DIAGNOSIS:  Anal dysplasia.      PROCEDURE:  Examination under anesthesia with high-definition anal microscopy, biopsy and fulguration of anal dysplasia.      HISTORY:  This 52-year-old HIV positive man was seen in the Colorectal Surgery Clinic by VIKASH Hidalgo in 06/2018.  Anal cytology at that time showed LSIL.  The  patient could not tolerate microscopy in the clinic and was accordingly referred for operative microscopy.  This proved difficult to schedule due to the patient having been homeless for some period of time.  He now returns for anal microscopy and treatment of any identified dysplasia under anesthesia.  I discussed the risks and alternatives of the procedure in detail to the patient.  I answered all of his questions to his stated satisfaction.  He expresses understanding and provided written informed consent.      SURGEON:  Thanh Lundberg MD.      ASSISTANT:   amy Dumont MD.      DESCRIPTION OF PROCEDURE:  With the patient in the left lateral decubitus position, under intravenous sedation by Anesthesia, the perianal skin was prepped and draped in sterile fashion.  A timeout was performed and the patient and procedure confirmed.  Local infiltration of 0.25% with epinephrine was utilized and a satisfactory perianal block was obtained.  The patient had numerous secretions that required turning off his sedation until these could be controlled.  He is probably a safer bet to perform his next microscopy under general anesthesia where we will have better control of his airway.  He very briefly desaturated, but responded rapidly to mask and had no ongoing problems.  The anal canal and perianal skin were soaked with 5% acetic acid and high-definition anal microscopy was performed using the colposcope.  There were no external lesions.  Eversion of the anal canal showed diffuse acetowhitening in moderate to coarse punctation extending all the way to the anal margin.  Multiple representative biopsies were obtained from the anal margin and the anal canal and sent for permanent section.  The most intense areas of acetowhitening and punctation were fulgurated using electrocautery.  There were milder areas of acetowhitening that I chose not to address due to the moderately extensive fulguration of the anal canal that I already  performed.  I feel it would be safer to await biopsies and repeat microscopy in several months' time to continue with fulguration after he has healed the current procedure.  The procedure was accordingly terminated and the patient was returned to the recovery area in satisfactory condition.  Sponge, needle and instrument counts were reported as correct at the conclusion of the case x2.  There were no immediate operative complications.      FINAL DIAGNOSIS:  A. Anus, left posterior margin, anoscopy:  - Low grade squamous intraepithelial lesion (formerly AIN 1)  - No evidence of high-grade intraepithelial lesion or malignancy    B. Anus, left anterior margin, anoscopy:  - Low grade squamous intraepithelial lesion (formerly AIN 1)  - No evidence of high-grade intraepithelial lesion or malignancy    C. Anus, right medial margin, anoscopy:  - Low grade squamous intraepithelial lesion (formerly AIN 1)  - No evidence of high-grade intraepithelial lesion or malignancy    D. Anus, right medial margin, anoscopy:  - Low grade squamous intraepithelial lesion (formerly AIN 1)  - No evidence of high-grade intraepithelial lesion or malignancy    E. Anus, right posterior anal canal, anoscopy  - Low grade squamous intraepithelial lesion (formerly AIN 1)  - No evidence of high-grade intraepithelial lesion or malignancy    F. Anus, anterior anal canal. anoscopy:  - High grade squamous intraepithelial lesion (formerly AIN 2)  - No evidence of invasive malignancy         1/22/2016 - Colonoscopy - Dr. Clark Saini  Indications:         Chronic diarrhea   Patient Profile:     49 yo Male with AIDS (CD4 24), CMV viremia, CNS                        toxoplasmosis, and anal lesions with HSV+ stain with                        intermittent diarrhea and abdominal pain. Found to have                        partial SBO with thickening in the distal jejunum which                        has resolved on interval imaging. Colonoscopy performed                         for further evaluation.   Findings:       The perianal and digital rectal examinations were normal.        The colon (entire examined portion) appeared normal. Biopsies were taken        with a cold forceps from the right colon and left colon for evaluation        of microscopic colitis.        The ileum, 10-15 cm from the ileocecal valve appeared normal.                                                                                    Impression:          - The entire examined colon is normal. Biopsied to                        evaluate for microscopic colitis.                        - The examined portion of the ileum was normal.                        - Based on this exam, no etiology of small bowel                        thickening was noted. We were unable to reach the area                        of concern. If further evaluation needed, would consider                        single balloon enteroscopy, versus capsule endoscopy,                        versus repeat imaging to see if abnormality persists (as                        already improving on CT).      FINAL DIAGNOSIS:   A. Large colon, right colon, biopsy:   -Colonic mucosa with no significant histologic abnormality   -See comment     B. Large bowel, left colon, biopsy:   -Colonic mucosa with no significant histologic abnormality   -See comment     COMMENT:   Some focal areas showed slightly increased subepithelial collagen.   Trichrome stain performed on both specimens with appropriate controls   show no conclusive evidence of thickened subepithelial collagen layer.            1/18/2016 - Small Bowel Enteroscopy - Dr. Marcus Nunez  Indications:         Abnormal abdominal CT   Patient Profile:     49 YO M with history of AIDS (CD4 count 24),                        disseminated varicella, CMV viremia, CNS toxoplasmosis,                        anal lesion with +HSV stain now with recurrent                        admissions for crampy lower  abdominal pain and loose                        stools. Upon admission actually had no stools. CT scan                        showed question of partial SBO. Thickening seen in the                        distal jejunum near area of transition point. Stool                        infectious studies negative for infection thus far. Now                        patient has antegrade bowel function. Reviewing CT shows                        question of some thickening in duodenum. Now attempting                        EGD with push enteroscopy to evaluate upper GI tract to                        distal jejunum and obtain biopsies.      Impression:          - Z-line regular, 35 cm from the incisors.                        - LA Grade A reflux esophagitis.                        - White plaques mucosa in the esophagus. Biopsied.                        - Striped and patchy moderately erythematous mucosa                        without bleeding was found in the gastric body and in                        the gastric antrum. Some areas appeared with more                        discrete beefy red erythema. There were a few scattered                        small erosions. Biopsied.                        - Normal examined duodenum. Biopsied.                        - The examined portion of the jejunum was normal.                        Biopsied.                        - Many biopsies were taken. Please see details of                        biopsies above under findings      FINAL DIAGNOSIS:  A. Small bowel, biopsy:  -Small intestinal mucosa with no significant histologic abnormality    B. Stomach, biopsy:  -Features of reactive gastropathy  -Negative for intestinal metaplasia or dysplasia    C. Small bowel, jejunum, biopsy:  -Small intestinal mucosa with no significant histologic abnormality  -Negative for HSV and CMV by immunostaining  -PAS stain negative for fungal elements    D. Small bowel, duodenum, biopsy:  -Duodenal mucosa  with no significant histologic abnormality  -Negative for HSV and CMV by immunostaining  -PAS stain negative for fungal elements    E. Stomach, biopsy:  -Gastric mucosa with no significant inflammation  -No H. Pylori like organisms identified on routine sections  -Negative for intestinal metaplasia or dysplasia  -Negative for HSV, CMV and HHV 8 by immunostaining  -GMS stain negative for fungal elements    F. Esophagus, biopsy:  -Squamous epithelium with superficial ulceration, acute inflammation and  ballooning degeneration  -Negative for HSV and CMV by immunostaining  -PAS stain negative for fungal elements     LABS:  BMP  Recent Labs   Lab 10/09/24  0840 10/08/24  2112 10/08/24  1649 10/08/24  1303 10/08/24  0603 10/08/24  0601 10/07/24  2058   NA  --   --   --   --   --  136 140   POTASSIUM  --   --   --   --   --  3.8 3.6   CHLORIDE  --   --   --   --   --  103 100   LINDA  --   --   --   --   --  8.6* 9.9   CO2  --   --   --   --   --  23 27   BUN  --   --   --   --   --  14.2 13.7   CR  --   --   --   --   --  0.62* 0.71   * 169* 160* 182*   < > 246* 274*    < > = values in this interval not displayed.     CBC  Recent Labs   Lab 10/08/24  0738 10/07/24  2058   WBC 7.6 12.2*   RBC 3.99* 5.09   HGB 11.9* 15.1   HCT 35.9* 45.6   MCV 90 90   MCH 29.8 29.7   MCHC 33.1 33.1   RDW 13.0 13.0    467*     INR  Recent Labs   Lab 10/08/24  0601   INR 1.05     LFTs  Recent Labs   Lab 10/08/24  0601 10/07/24  2058   ALKPHOS 93 113   AST 15 20   ALT 12 18   BILITOTAL 0.7 0.8   PROTTOTAL 6.3* 8.2   ALBUMIN 3.4* 4.5      PANC  Recent Labs   Lab 10/07/24  2058   LIPASE 31         IMAGING:    XR ABDOMEN PORT 1 VIEW, 10/9/2024 10:21 AM  Indication: GOO and concern for sbo on last ct.     Comparison: 10/7/2024 CT, 9/30/2024 abdominal radiographs     Findings:   Supine frontal views of the abdomen. Scattered air-filled, nondilated  loops of small and large bowel. No appreciable pneumatosis or portal  venous gas. Mild  colonic stool burden. Partially visualized penile  prosthesis. The lung bases are clear. No acute osseous abnormality.                                                                  Impression: Nonobstructive bowel gas pattern.      CT ABDOMEN PELVIS W CONTRAST  LOCATION: [facility]  DATE: 10/7/2024     INDICATION: Abdominal pain, vomiting, repeated SBO's, eval for interval change  COMPARISON: 9/28/2024  TECHNIQUE: CT scan of the abdomen and pelvis was performed following injection of IV contrast. Multiplanar reformats were obtained. Dose reduction techniques were used.  CONTRAST: 92ml isovue 370     FINDINGS:   LOWER CHEST: Minimal dependent atelectasis in the posterior lung bases.     HEPATOBILIARY: Normal contour with no significant mass. Stable focal fatty infiltration adjacent to the falciform ligament along the anterior liver. No bile duct dilatation. Calcified gallstone within the neck of the gallbladder. Gallbladder is   unremarkable.     PANCREAS: No significant mass, duct dilatation, or inflammatory change.     SPLEEN: Normal.     ADRENAL GLANDS: Normal.     KIDNEYS/BLADDER: The bilateral kidneys enhance symmetrically without evidence for hydronephrosis or pyelonephritis. No renal masses or calculi noted. The bilateral ureters and urinary bladder are unremarkable.     BOWEL: Marked fluid-filled and gas-filled stomach with mild thickening of the distal stomach at the pylorus. Clinical correlation for gastric outlet obstruction recommended.  Endoscopic evaluation recommended. There is some mild thickening and mild   enhancement of small bowel in the left lower quadrant. Previously there is an obstruction in this region and this may be postobstructive changes and/or enteritis.     LYMPH NODES: No lymphadenopathy.     VASCULATURE: No abdominal aortic aneurysm.     PELVIC ORGANS: No pelvic masses. Penile implant.     MUSCULOSKELETAL: No concerning osseous  abnormalities.                                                                      IMPRESSION:   1.  Marked fluid-filled and gas-filled stomach with mild thickening of the distal stomach at the pylorus. Clinical correlation for gastric outlet obstruction recommended. Endoscopic evaluation recommended.  2.  There is some mild thickening and enhancement of small bowel in the left lower quadrant. Previously there is an obstruction in this region and this may be postobstructive changes and/or enteritis.  3.  Cholelithiasis.

## 2024-10-09 NOTE — SUMMARY OF CARE
Assumed care [id number]     Events this shift: Pt arrived from ED @ 18:45. Pt irritable, refused admission assessments. C/o 9/10 LLQ pain; PRN tylenol given x1.

## 2024-10-10 ENCOUNTER — APPOINTMENT (OUTPATIENT)
Dept: CT IMAGING | Facility: CLINIC | Age: 59
End: 2024-10-10
Attending: EMERGENCY MEDICINE
Payer: COMMERCIAL

## 2024-10-10 ENCOUNTER — HOSPITAL ENCOUNTER (EMERGENCY)
Facility: CLINIC | Age: 59
Discharge: HOME | End: 2024-10-10
Attending: EMERGENCY MEDICINE | Admitting: EMERGENCY MEDICINE
Payer: COMMERCIAL

## 2024-10-10 ENCOUNTER — APPOINTMENT (OUTPATIENT)
Dept: GENERAL RADIOLOGY | Facility: CLINIC | Age: 59
End: 2024-10-10
Attending: INTERNAL MEDICINE
Payer: COMMERCIAL

## 2024-10-10 VITALS
TEMPERATURE: 98.5 F | SYSTOLIC BLOOD PRESSURE: 100 MMHG | OXYGEN SATURATION: 98 % | WEIGHT: 160 LBS | RESPIRATION RATE: 14 BRPM | BODY MASS INDEX: 27.31 KG/M2 | HEART RATE: 84 BPM | HEIGHT: 64 IN | DIASTOLIC BLOOD PRESSURE: 64 MMHG

## 2024-10-10 VITALS
RESPIRATION RATE: 22 BRPM | HEART RATE: 100 BPM | SYSTOLIC BLOOD PRESSURE: 144 MMHG | OXYGEN SATURATION: 100 % | DIASTOLIC BLOOD PRESSURE: 81 MMHG | TEMPERATURE: 98.2 F

## 2024-10-10 DIAGNOSIS — Z98.890 S/P ENDOSCOPY: ICD-10-CM

## 2024-10-10 DIAGNOSIS — R10.84 GENERALIZED ABDOMINAL PAIN: ICD-10-CM

## 2024-10-10 LAB
ALBUMIN SERPL BCG-MCNC: 3.3 G/DL (ref 3.5–5.2)
ALBUMIN SERPL BCG-MCNC: 3.6 G/DL (ref 3.5–5.2)
ALP SERPL-CCNC: 81 U/L (ref 40–150)
ALP SERPL-CCNC: 85 U/L (ref 40–150)
ALT SERPL W P-5'-P-CCNC: 10 U/L (ref 0–70)
ALT SERPL W P-5'-P-CCNC: <5 U/L (ref 0–70)
ANION GAP SERPL CALCULATED.3IONS-SCNC: 7 MMOL/L (ref 7–15)
ANION GAP SERPL CALCULATED.3IONS-SCNC: 8 MMOL/L (ref 7–15)
APTT PPP: 32 SECONDS (ref 22–38)
APTT PPP: 33 SECONDS (ref 22–38)
AST SERPL W P-5'-P-CCNC: 15 U/L (ref 0–45)
AST SERPL W P-5'-P-CCNC: 20 U/L (ref 0–45)
BASOPHILS # BLD AUTO: 0 10E3/UL (ref 0–0.2)
BASOPHILS # BLD AUTO: 0 10E3/UL (ref 0–0.2)
BASOPHILS NFR BLD AUTO: 0 %
BASOPHILS NFR BLD AUTO: 0 %
BILIRUB DIRECT SERPL-MCNC: <0.2 MG/DL (ref 0–0.3)
BILIRUB SERPL-MCNC: 0.7 MG/DL
BILIRUB SERPL-MCNC: 0.8 MG/DL
BUN SERPL-MCNC: 8.6 MG/DL (ref 8–23)
BUN SERPL-MCNC: 8.8 MG/DL (ref 8–23)
CALCIUM SERPL-MCNC: 8.7 MG/DL (ref 8.8–10.4)
CALCIUM SERPL-MCNC: 8.8 MG/DL (ref 8.8–10.4)
CHLORIDE SERPL-SCNC: 105 MMOL/L (ref 98–107)
CHLORIDE SERPL-SCNC: 107 MMOL/L (ref 98–107)
CREAT SERPL-MCNC: 0.72 MG/DL (ref 0.67–1.17)
CREAT SERPL-MCNC: 0.86 MG/DL (ref 0.67–1.17)
EGFRCR SERPLBLD CKD-EPI 2021: >90 ML/MIN/1.73M2
EGFRCR SERPLBLD CKD-EPI 2021: >90 ML/MIN/1.73M2
EOSINOPHIL # BLD AUTO: 0.2 10E3/UL (ref 0–0.7)
EOSINOPHIL # BLD AUTO: 0.2 10E3/UL (ref 0–0.7)
EOSINOPHIL NFR BLD AUTO: 2 %
EOSINOPHIL NFR BLD AUTO: 2 %
ERYTHROCYTE [DISTWIDTH] IN BLOOD BY AUTOMATED COUNT: 12.8 % (ref 10–15)
ERYTHROCYTE [DISTWIDTH] IN BLOOD BY AUTOMATED COUNT: 12.9 % (ref 10–15)
ETHANOL SERPL-MCNC: <0.01 G/DL
FIBRINOGEN PPP-MCNC: 371 MG/DL (ref 170–510)
GLUCOSE BLDC GLUCOMTR-MCNC: 111 MG/DL (ref 70–99)
GLUCOSE BLDC GLUCOMTR-MCNC: 97 MG/DL (ref 70–99)
GLUCOSE BLDC GLUCOMTR-MCNC: 98 MG/DL (ref 70–99)
GLUCOSE SERPL-MCNC: 136 MG/DL (ref 70–99)
GLUCOSE SERPL-MCNC: 82 MG/DL (ref 70–99)
HCO3 SERPL-SCNC: 23 MMOL/L (ref 22–29)
HCO3 SERPL-SCNC: 25 MMOL/L (ref 22–29)
HCT VFR BLD AUTO: 35.6 % (ref 40–53)
HCT VFR BLD AUTO: 38.1 % (ref 40–53)
HGB BLD-MCNC: 11.8 G/DL (ref 13.3–17.7)
HGB BLD-MCNC: 12.4 G/DL (ref 13.3–17.7)
IMM GRANULOCYTES # BLD: 0 10E3/UL
IMM GRANULOCYTES # BLD: 0 10E3/UL
IMM GRANULOCYTES NFR BLD: 0 %
IMM GRANULOCYTES NFR BLD: 1 %
INR PPP: 1.11 (ref 0.85–1.15)
INR PPP: 1.12 (ref 0.85–1.15)
LACTATE SERPL-SCNC: 0.7 MMOL/L (ref 0.7–2)
LIPASE SERPL-CCNC: 20 U/L (ref 13–60)
LYMPHOCYTES # BLD AUTO: 2.1 10E3/UL (ref 0.8–5.3)
LYMPHOCYTES # BLD AUTO: 2.1 10E3/UL (ref 0.8–5.3)
LYMPHOCYTES NFR BLD AUTO: 28 %
LYMPHOCYTES NFR BLD AUTO: 32 %
MAGNESIUM SERPL-MCNC: 1.6 MG/DL (ref 1.7–2.3)
MAGNESIUM SERPL-MCNC: 1.7 MG/DL (ref 1.7–2.3)
MCH RBC QN AUTO: 29.7 PG (ref 26.5–33)
MCH RBC QN AUTO: 30.2 PG (ref 26.5–33)
MCHC RBC AUTO-ENTMCNC: 32.5 G/DL (ref 31.5–36.5)
MCHC RBC AUTO-ENTMCNC: 33.1 G/DL (ref 31.5–36.5)
MCV RBC AUTO: 91 FL (ref 78–100)
MCV RBC AUTO: 91 FL (ref 78–100)
MONOCYTES # BLD AUTO: 0.4 10E3/UL (ref 0–1.3)
MONOCYTES # BLD AUTO: 0.5 10E3/UL (ref 0–1.3)
MONOCYTES NFR BLD AUTO: 6 %
MONOCYTES NFR BLD AUTO: 7 %
NEUTROPHILS # BLD AUTO: 3.9 10E3/UL (ref 1.6–8.3)
NEUTROPHILS # BLD AUTO: 4.7 10E3/UL (ref 1.6–8.3)
NEUTROPHILS NFR BLD AUTO: 59 %
NEUTROPHILS NFR BLD AUTO: 62 %
NRBC # BLD AUTO: 0 10E3/UL
NRBC # BLD AUTO: 0 10E3/UL
NRBC BLD AUTO-RTO: 0 /100
NRBC BLD AUTO-RTO: 0 /100
PHOSPHATE SERPL-MCNC: 3 MG/DL (ref 2.5–4.5)
PLATELET # BLD AUTO: 318 10E3/UL (ref 150–450)
PLATELET # BLD AUTO: 332 10E3/UL (ref 150–450)
POTASSIUM SERPL-SCNC: 3.7 MMOL/L (ref 3.4–5.3)
POTASSIUM SERPL-SCNC: 3.7 MMOL/L (ref 3.4–5.3)
PROT SERPL-MCNC: 6.2 G/DL (ref 6.4–8.3)
PROT SERPL-MCNC: 6.5 G/DL (ref 6.4–8.3)
RBC # BLD AUTO: 3.91 10E6/UL (ref 4.4–5.9)
RBC # BLD AUTO: 4.18 10E6/UL (ref 4.4–5.9)
SODIUM SERPL-SCNC: 137 MMOL/L (ref 135–145)
SODIUM SERPL-SCNC: 138 MMOL/L (ref 135–145)
TROPONIN T SERPL HS-MCNC: 16 NG/L
TROPONIN T SERPL HS-MCNC: 19 NG/L
UPPER GI ENDOSCOPY: NORMAL
WBC # BLD AUTO: 6.6 10E3/UL (ref 4–11)
WBC # BLD AUTO: 7.6 10E3/UL (ref 4–11)

## 2024-10-10 PROCEDURE — 85384 FIBRINOGEN ACTIVITY: CPT | Performed by: STUDENT IN AN ORGANIZED HEALTH CARE EDUCATION/TRAINING PROGRAM

## 2024-10-10 PROCEDURE — 96375 TX/PRO/DX INJ NEW DRUG ADDON: CPT | Performed by: EMERGENCY MEDICINE

## 2024-10-10 PROCEDURE — 83690 ASSAY OF LIPASE: CPT | Performed by: EMERGENCY MEDICINE

## 2024-10-10 PROCEDURE — 250N000011 HC RX IP 250 OP 636: Performed by: INTERNAL MEDICINE

## 2024-10-10 PROCEDURE — 250N000009 HC RX 250: Performed by: NURSE ANESTHETIST, CERTIFIED REGISTERED

## 2024-10-10 PROCEDURE — 83605 ASSAY OF LACTIC ACID: CPT | Performed by: EMERGENCY MEDICINE

## 2024-10-10 PROCEDURE — 83735 ASSAY OF MAGNESIUM: CPT

## 2024-10-10 PROCEDURE — 85018 HEMOGLOBIN: CPT | Performed by: STUDENT IN AN ORGANIZED HEALTH CARE EDUCATION/TRAINING PROGRAM

## 2024-10-10 PROCEDURE — 99285 EMERGENCY DEPT VISIT HI MDM: CPT | Performed by: EMERGENCY MEDICINE

## 2024-10-10 PROCEDURE — 250N000011 HC RX IP 250 OP 636: Performed by: NURSE ANESTHETIST, CERTIFIED REGISTERED

## 2024-10-10 PROCEDURE — 250N000013 HC RX MED GY IP 250 OP 250 PS 637

## 2024-10-10 PROCEDURE — 80048 BASIC METABOLIC PNL TOTAL CA: CPT | Performed by: EMERGENCY MEDICINE

## 2024-10-10 PROCEDURE — 99285 EMERGENCY DEPT VISIT HI MDM: CPT | Mod: 25 | Performed by: EMERGENCY MEDICINE

## 2024-10-10 PROCEDURE — 83735 ASSAY OF MAGNESIUM: CPT | Performed by: EMERGENCY MEDICINE

## 2024-10-10 PROCEDURE — 258N000003 HC RX IP 258 OP 636: Performed by: NURSE ANESTHETIST, CERTIFIED REGISTERED

## 2024-10-10 PROCEDURE — 99232 SBSQ HOSP IP/OBS MODERATE 35: CPT | Mod: 25

## 2024-10-10 PROCEDURE — 82077 ASSAY SPEC XCP UR&BREATH IA: CPT | Performed by: EMERGENCY MEDICINE

## 2024-10-10 PROCEDURE — 99140 ANES COMP EMERGENCY COND: CPT | Performed by: NURSE ANESTHETIST, CERTIFIED REGISTERED

## 2024-10-10 PROCEDURE — 85610 PROTHROMBIN TIME: CPT | Performed by: STUDENT IN AN ORGANIZED HEALTH CARE EDUCATION/TRAINING PROGRAM

## 2024-10-10 PROCEDURE — 85610 PROTHROMBIN TIME: CPT | Performed by: EMERGENCY MEDICINE

## 2024-10-10 PROCEDURE — 82248 BILIRUBIN DIRECT: CPT

## 2024-10-10 PROCEDURE — 85004 AUTOMATED DIFF WBC COUNT: CPT | Performed by: STUDENT IN AN ORGANIZED HEALTH CARE EDUCATION/TRAINING PROGRAM

## 2024-10-10 PROCEDURE — 258N000003 HC RX IP 258 OP 636: Performed by: STUDENT IN AN ORGANIZED HEALTH CARE EDUCATION/TRAINING PROGRAM

## 2024-10-10 PROCEDURE — 36415 COLL VENOUS BLD VENIPUNCTURE: CPT | Performed by: STUDENT IN AN ORGANIZED HEALTH CARE EDUCATION/TRAINING PROGRAM

## 2024-10-10 PROCEDURE — 0DJ08ZZ INSPECTION OF UPPER INTESTINAL TRACT, VIA NATURAL OR ARTIFICIAL OPENING ENDOSCOPIC: ICD-10-PCS | Performed by: INTERNAL MEDICINE

## 2024-10-10 PROCEDURE — 82248 BILIRUBIN DIRECT: CPT | Performed by: STUDENT IN AN ORGANIZED HEALTH CARE EDUCATION/TRAINING PROGRAM

## 2024-10-10 PROCEDURE — 43235 EGD DIAGNOSTIC BRUSH WASH: CPT | Performed by: STUDENT IN AN ORGANIZED HEALTH CARE EDUCATION/TRAINING PROGRAM

## 2024-10-10 PROCEDURE — 74177 CT ABD & PELVIS W/CONTRAST: CPT

## 2024-10-10 PROCEDURE — 250N000009 HC RX 250: Performed by: STUDENT IN AN ORGANIZED HEALTH CARE EDUCATION/TRAINING PROGRAM

## 2024-10-10 PROCEDURE — 370N000017 HC ANESTHESIA TECHNICAL FEE, PER MIN: Performed by: INTERNAL MEDICINE

## 2024-10-10 PROCEDURE — 85730 THROMBOPLASTIN TIME PARTIAL: CPT | Performed by: STUDENT IN AN ORGANIZED HEALTH CARE EDUCATION/TRAINING PROGRAM

## 2024-10-10 PROCEDURE — 96374 THER/PROPH/DIAG INJ IV PUSH: CPT | Mod: 59 | Performed by: EMERGENCY MEDICINE

## 2024-10-10 PROCEDURE — 74018 RADEX ABDOMEN 1 VIEW: CPT | Mod: 26 | Performed by: STUDENT IN AN ORGANIZED HEALTH CARE EDUCATION/TRAINING PROGRAM

## 2024-10-10 PROCEDURE — 85730 THROMBOPLASTIN TIME PARTIAL: CPT | Performed by: EMERGENCY MEDICINE

## 2024-10-10 PROCEDURE — 86359 T CELLS TOTAL COUNT: CPT

## 2024-10-10 PROCEDURE — 74177 CT ABD & PELVIS W/CONTRAST: CPT | Mod: 26 | Performed by: RADIOLOGY

## 2024-10-10 PROCEDURE — 250N000025 HC SEVOFLURANE, PER MIN: Performed by: INTERNAL MEDICINE

## 2024-10-10 PROCEDURE — 99239 HOSP IP/OBS DSCHRG MGMT >30: CPT | Performed by: STUDENT IN AN ORGANIZED HEALTH CARE EDUCATION/TRAINING PROGRAM

## 2024-10-10 PROCEDURE — 43235 EGD DIAGNOSTIC BRUSH WASH: CPT | Performed by: NURSE ANESTHETIST, CERTIFIED REGISTERED

## 2024-10-10 PROCEDURE — 250N000011 HC RX IP 250 OP 636: Performed by: EMERGENCY MEDICINE

## 2024-10-10 PROCEDURE — 84100 ASSAY OF PHOSPHORUS: CPT

## 2024-10-10 PROCEDURE — 99140 ANES COMP EMERGENCY COND: CPT | Performed by: STUDENT IN AN ORGANIZED HEALTH CARE EDUCATION/TRAINING PROGRAM

## 2024-10-10 PROCEDURE — 43235 EGD DIAGNOSTIC BRUSH WASH: CPT | Performed by: INTERNAL MEDICINE

## 2024-10-10 PROCEDURE — 85025 COMPLETE CBC W/AUTO DIFF WBC: CPT | Performed by: EMERGENCY MEDICINE

## 2024-10-10 PROCEDURE — 999N000065 XR ABDOMEN PORT 1 VIEW

## 2024-10-10 PROCEDURE — 84484 ASSAY OF TROPONIN QUANT: CPT | Performed by: EMERGENCY MEDICINE

## 2024-10-10 PROCEDURE — 36415 COLL VENOUS BLD VENIPUNCTURE: CPT | Performed by: EMERGENCY MEDICINE

## 2024-10-10 RX ORDER — GLYCOPYRROLATE 0.2 MG/ML
INJECTION, SOLUTION INTRAMUSCULAR; INTRAVENOUS PRN
Status: DISCONTINUED | OUTPATIENT
Start: 2024-10-10 | End: 2024-10-10

## 2024-10-10 RX ORDER — FENTANYL CITRATE 50 UG/ML
INJECTION, SOLUTION INTRAMUSCULAR; INTRAVENOUS PRN
Status: DISCONTINUED | OUTPATIENT
Start: 2024-10-10 | End: 2024-10-10

## 2024-10-10 RX ORDER — MORPHINE SULFATE 2 MG/ML
2 INJECTION, SOLUTION INTRAMUSCULAR; INTRAVENOUS ONCE
Status: COMPLETED | OUTPATIENT
Start: 2024-10-10 | End: 2024-10-10

## 2024-10-10 RX ORDER — ONDANSETRON 2 MG/ML
4 INJECTION INTRAMUSCULAR; INTRAVENOUS ONCE
Status: COMPLETED | OUTPATIENT
Start: 2024-10-10 | End: 2024-10-10

## 2024-10-10 RX ORDER — ONDANSETRON 2 MG/ML
INJECTION INTRAMUSCULAR; INTRAVENOUS PRN
Status: DISCONTINUED | OUTPATIENT
Start: 2024-10-10 | End: 2024-10-10

## 2024-10-10 RX ORDER — KETOROLAC TROMETHAMINE 15 MG/ML
10 INJECTION, SOLUTION INTRAMUSCULAR; INTRAVENOUS ONCE
Status: COMPLETED | OUTPATIENT
Start: 2024-10-10 | End: 2024-10-10

## 2024-10-10 RX ORDER — NALOXONE HYDROCHLORIDE 0.4 MG/ML
0.2 INJECTION, SOLUTION INTRAMUSCULAR; INTRAVENOUS; SUBCUTANEOUS
Status: DISCONTINUED | OUTPATIENT
Start: 2024-10-10 | End: 2024-10-10 | Stop reason: HOSPADM

## 2024-10-10 RX ORDER — HALOPERIDOL 5 MG/ML
5 INJECTION INTRAMUSCULAR EVERY 6 HOURS PRN
Status: DISCONTINUED | OUTPATIENT
Start: 2024-10-10 | End: 2024-10-10 | Stop reason: HOSPADM

## 2024-10-10 RX ORDER — NALOXONE HYDROCHLORIDE 0.4 MG/ML
0.4 INJECTION, SOLUTION INTRAMUSCULAR; INTRAVENOUS; SUBCUTANEOUS
Status: DISCONTINUED | OUTPATIENT
Start: 2024-10-10 | End: 2024-10-10 | Stop reason: HOSPADM

## 2024-10-10 RX ORDER — SODIUM CHLORIDE, SODIUM LACTATE, POTASSIUM CHLORIDE, CALCIUM CHLORIDE 600; 310; 30; 20 MG/100ML; MG/100ML; MG/100ML; MG/100ML
INJECTION, SOLUTION INTRAVENOUS CONTINUOUS PRN
Status: DISCONTINUED | OUTPATIENT
Start: 2024-10-10 | End: 2024-10-10

## 2024-10-10 RX ORDER — IOPAMIDOL 755 MG/ML
99 INJECTION, SOLUTION INTRAVASCULAR ONCE
Status: COMPLETED | OUTPATIENT
Start: 2024-10-10 | End: 2024-10-10

## 2024-10-10 RX ORDER — LIDOCAINE HYDROCHLORIDE 20 MG/ML
INJECTION, SOLUTION INFILTRATION; PERINEURAL PRN
Status: DISCONTINUED | OUTPATIENT
Start: 2024-10-10 | End: 2024-10-10

## 2024-10-10 RX ORDER — PROPOFOL 10 MG/ML
INJECTION, EMULSION INTRAVENOUS PRN
Status: DISCONTINUED | OUTPATIENT
Start: 2024-10-10 | End: 2024-10-10

## 2024-10-10 RX ORDER — HYDROMORPHONE HYDROCHLORIDE 1 MG/ML
0.5 INJECTION, SOLUTION INTRAMUSCULAR; INTRAVENOUS; SUBCUTANEOUS
Status: DISCONTINUED | OUTPATIENT
Start: 2024-10-10 | End: 2024-10-10 | Stop reason: HOSPADM

## 2024-10-10 RX ADMIN — SODIUM CHLORIDE, POTASSIUM CHLORIDE, SODIUM LACTATE AND CALCIUM CHLORIDE: 600; 310; 30; 20 INJECTION, SOLUTION INTRAVENOUS at 13:24

## 2024-10-10 RX ADMIN — PROPOFOL 150 MG: 10 INJECTION, EMULSION INTRAVENOUS at 13:30

## 2024-10-10 RX ADMIN — PANTOPRAZOLE SODIUM 40 MG: 40 INJECTION, POWDER, FOR SOLUTION INTRAVENOUS at 11:56

## 2024-10-10 RX ADMIN — PROPOFOL 50 MG: 10 INJECTION, EMULSION INTRAVENOUS at 13:34

## 2024-10-10 RX ADMIN — HYDROMORPHONE HYDROCHLORIDE 0.5 MG: 1 INJECTION, SOLUTION INTRAMUSCULAR; INTRAVENOUS; SUBCUTANEOUS at 06:17

## 2024-10-10 RX ADMIN — DOXYCYCLINE 100 MG: 100 CAPSULE ORAL at 11:57

## 2024-10-10 RX ADMIN — IOPAMIDOL 99 ML: 755 INJECTION, SOLUTION INTRAVENOUS at 19:07

## 2024-10-10 RX ADMIN — SUCCINYLCHOLINE CHLORIDE 100 MG: 20 INJECTION, SOLUTION INTRAMUSCULAR; INTRAVENOUS; PARENTERAL at 13:30

## 2024-10-10 RX ADMIN — MORPHINE SULFATE 2 MG: 2 INJECTION, SOLUTION INTRAMUSCULAR; INTRAVENOUS at 19:27

## 2024-10-10 RX ADMIN — FAMOTIDINE 40 MG: 20 TABLET ORAL at 11:57

## 2024-10-10 RX ADMIN — KETOROLAC TROMETHAMINE 10 MG: 15 INJECTION, SOLUTION INTRAMUSCULAR; INTRAVENOUS at 19:27

## 2024-10-10 RX ADMIN — BICTEGRAVIR SODIUM, EMTRICITABINE, AND TENOFOVIR ALAFENAMIDE FUMARATE 1 TABLET: 50; 200; 25 TABLET ORAL at 11:56

## 2024-10-10 RX ADMIN — HYDROMORPHONE HYDROCHLORIDE 0.5 MG: 1 INJECTION, SOLUTION INTRAMUSCULAR; INTRAVENOUS; SUBCUTANEOUS at 02:15

## 2024-10-10 RX ADMIN — ONDANSETRON 4 MG: 2 INJECTION INTRAMUSCULAR; INTRAVENOUS at 13:42

## 2024-10-10 RX ADMIN — Medication 20 MG: at 13:40

## 2024-10-10 RX ADMIN — SUGAMMADEX 200 MG: 100 INJECTION, SOLUTION INTRAVENOUS at 13:50

## 2024-10-10 RX ADMIN — SODIUM CHLORIDE: 9 INJECTION, SOLUTION INTRAVENOUS at 12:00

## 2024-10-10 RX ADMIN — GLYCOPYRROLATE 0.2 MG: 0.2 INJECTION, SOLUTION INTRAMUSCULAR; INTRAVENOUS at 13:42

## 2024-10-10 RX ADMIN — FENTANYL CITRATE 100 MCG: 50 INJECTION INTRAMUSCULAR; INTRAVENOUS at 13:30

## 2024-10-10 RX ADMIN — LIDOCAINE HYDROCHLORIDE 100 MG: 20 INJECTION, SOLUTION INFILTRATION; PERINEURAL at 13:30

## 2024-10-10 ASSESSMENT — ACTIVITIES OF DAILY LIVING (ADL)
ADLS_ACUITY_SCORE: 37

## 2024-10-10 NOTE — PLAN OF CARE
Goal Outcome Evaluation:      Plan of Care Reviewed With: patient    Overall Patient Progress: no change    Outcome Evaluation: Patient declined all RN assessements this am, asking not to be distrubed till 1100.  Pt refused labs and meds till 1100 as well. NGT was removed by pt overnight. Pt denied abdominal pain, N/V. Pt asking frequently to eat, despite NPO status. Pt reminded of the risks of eating prior to anesthesia, pt compliant with NPO but continuing to complain of hunger.     Pt brought to OR for EGD. Writer received report from anesthesia and GI RN that no interventions were performed during EGD and pt tolerated procedure. Writer then received several calls from PACU RN asking about pt's baseline mental status, whether he had a sitter on the unit, and if he can be held. Writer advised that pt did not have a sitter prior to going to OR, was A&Ox4, but had been declining many cares and assessments. Advised PACU RN to contact primary team to assess pt's decisional capacity. Writer received another call asking for pt's belongings, as the PACU RN thought pt might be more compliant if his belongings were visible and available to him. Writer packed all belongings in bags and sent down to PACU with staff. Charge RN received an additional call asking if pt is decisional, charge advised PACU RN call attending medicine provider. Writer called medicine attending and asked attending provider to assess pt in PACU to determine decisional capacity. PACU RN called unit again, speaking with charge RN, and stated that the pt will not return to unit and will be leaving AMA.     Belongings sent down to PACU include back pack, small shoulder sling bag, white sneakers, red sweatshirt, and blue jeans. Writer did not open bags or inspect contents as pt was not present and had not given permission to do so. No other belongings evident in room after thorough search.

## 2024-10-10 NOTE — PLAN OF CARE
Goal Outcome Evaluation: [facility] [id number]. Pt A&Ox4- easily irritable, VSS on RA, and able to make needs known. Pt c/o 9/10 abdominal pain. Placed NG tube- low intermittent suction- this shift. Pt tolerated well but did not want to wait for xray confirmation. Pt stated they would pull their tube out if it was not hooked up to suction right away despite education about the importance of an xray. Xray then came and confirmed it was in the right spot. Pt was still having 9/10 pain, managed with PRN IV dilaudid and had relief. LPIV running NS @100 mL/hr. Voiding spontaneously. Still need stool sample. NPO for EGD today. Pt also refused 1 BG check during the shift. During the night pt pulled out his NG tube without notifying writer, writer found it on the floor. Pt refuses a new NG tube placement, MD notified. Continue with plan of care.       Plan of Care Reviewed With: patient    Overall Patient Progress: improvingOverall Patient Progress: improving

## 2024-10-10 NOTE — OR NURSING
Patient had EGD NO interventions.  Patient tolerated procedure under general anesthesia.  Report called to [id number] RN, Suly Almanzar. Patient to recover in PACU prior to returning to floor.

## 2024-10-10 NOTE — ED PROVIDER NOTES
"    [place] EMERGENCY DEPARTMENT ([facility])    10/10/24       ED PROVIDER NOTE    History     Chief Complaint   Patient presents with    Abdominal Pain     Pt reports \"my bowel is obstructed, I am in pain\".      HPI  Andrew Collier is a 58 year old male with postprandial abdominal pain today. had a endoscopy performed earlier in the day and had a CT abdomen. results I reviewed in epic today showing air and fluid-filled dilated small bowel loops concerning for a bowel obstruction. I reviewed these notes in the chart.      Physical Exam   BP: 109/64  Pulse: 84  Temp: 98.5  F (36.9  C)  Resp: 18  Height: 162.6 cm (5' 4\")  Weight: 72.6 kg (160 lb)  SpO2: 99 %  Physical Exam  alert oriented breathing comfortably   abdomen soft with tenderness to palpation on the left side with tympany    ED Course, Procedures, & Data      Procedures              Results for orders placed or performed during the hospital encounter of 10/10/24   CT Abdomen Pelvis w Contrast     Status: None (Preliminary result)    Impression    RESIDENT PRELIMINARY INTERPRETATION  IMPRESSION:   1. Prominent borderline dilated loops of small bowel in the lower  abdomen with no definitive transition point, may be suggestive of a  developing adynamic ileus.  2. Cholelithiasis with the gallstone near the gallbladder neck,  without acute cholecystitis.  3. Diverticulosis without acute diverticulitis.   INR     Status: Normal   Result Value Ref Range    INR 1.12 0.85 - 1.15   Partial thromboplastin time     Status: Normal   Result Value Ref Range    aPTT 32 22 - 38 Seconds   Comprehensive metabolic panel     Status: Abnormal   Result Value Ref Range    Sodium 138 135 - 145 mmol/L    Potassium 3.7 3.4 - 5.3 mmol/L    Carbon Dioxide (CO2) 25 22 - 29 mmol/L    Anion Gap 8 7 - 15 mmol/L    Urea Nitrogen 8.8 8.0 - 23.0 mg/dL    Creatinine 0.86 0.67 - 1.17 mg/dL    GFR Estimate >90 >60 mL/min/1.73m2    Calcium 8.8 8.8 - 10.4 mg/dL    Chloride 105 " 98 - 107 mmol/L    Glucose 136 (H) 70 - 99 mg/dL    Alkaline Phosphatase 85 40 - 150 U/L    AST 15 0 - 45 U/L    ALT 10 0 - 70 U/L    Protein Total 6.5 6.4 - 8.3 g/dL    Albumin 3.6 3.5 - 5.2 g/dL    Bilirubin Total 0.8 <=1.2 mg/dL   Lactic acid whole blood with 1x repeat in 2 hr when >2     Status: Normal   Result Value Ref Range    Lactic Acid, Initial 0.7 0.7 - 2.0 mmol/L   Lipase     Status: Normal   Result Value Ref Range    Lipase 20 13 - 60 U/L   Troponin T, High Sensitivity     Status: Normal   Result Value Ref Range    Troponin T, High Sensitivity 19 <=22 ng/L   Magnesium     Status: Abnormal   Result Value Ref Range    Magnesium 1.6 (L) 1.7 - 2.3 mg/dL   Ethyl Alcohol Level     Status: Normal   Result Value Ref Range    Alcohol ethyl <0.01 <=0.01 g/dL   CBC with platelets and differential     Status: Abnormal   Result Value Ref Range    WBC Count 7.6 4.0 - 11.0 10e3/uL    RBC Count 3.91 (L) 4.40 - 5.90 10e6/uL    Hemoglobin 11.8 (L) 13.3 - 17.7 g/dL    Hematocrit 35.6 (L) 40.0 - 53.0 %    MCV 91 78 - 100 fL    MCH 30.2 26.5 - 33.0 pg    MCHC 33.1 31.5 - 36.5 g/dL    RDW 12.9 10.0 - 15.0 %    Platelet Count 332 150 - 450 10e3/uL    % Neutrophils 62 %    % Lymphocytes 28 %    % Monocytes 7 %    % Eosinophils 2 %    % Basophils 0 %    % Immature Granulocytes 0 %    NRBCs per 100 WBC 0 <1 /100    Absolute Neutrophils 4.7 1.6 - 8.3 10e3/uL    Absolute Lymphocytes 2.1 0.8 - 5.3 10e3/uL    Absolute Monocytes 0.5 0.0 - 1.3 10e3/uL    Absolute Eosinophils 0.2 0.0 - 0.7 10e3/uL    Absolute Basophils 0.0 0.0 - 0.2 10e3/uL    Absolute Immature Granulocytes 0.0 <=0.4 10e3/uL    Absolute NRBCs 0.0 10e3/uL   Troponin T, High Sensitivity     Status: Normal   Result Value Ref Range    Troponin T, High Sensitivity 16 <=22 ng/L   CBC with platelets differential     Status: Abnormal    Narrative    The following orders were created for panel order CBC with platelets differential.  Procedure                                Abnormality         Status                     ---------                               -----------         ------                     CBC with platelets and d...[[id number]]  Abnormal            Final result                 Please view results for these tests on the individual orders.   Results for orders placed or performed during the hospital encounter of 10/07/24   CT Abdomen Pelvis w Contrast     Status: None    Narrative    EXAM: CT ABDOMEN PELVIS W CONTRAST  LOCATION: [facility]  DATE: 10/7/2024    INDICATION: Abdominal pain, vomiting, repeated SBO's, eval for interval change  COMPARISON: 9/28/2024  TECHNIQUE: CT scan of the abdomen and pelvis was performed following injection of IV contrast. Multiplanar reformats were obtained. Dose reduction techniques were used.  CONTRAST: 92ml isovue 370    FINDINGS:   LOWER CHEST: Minimal dependent atelectasis in the posterior lung bases.    HEPATOBILIARY: Normal contour with no significant mass. Stable focal fatty infiltration adjacent to the falciform ligament along the anterior liver. No bile duct dilatation. Calcified gallstone within the neck of the gallbladder. Gallbladder is   unremarkable.    PANCREAS: No significant mass, duct dilatation, or inflammatory change.    SPLEEN: Normal.    ADRENAL GLANDS: Normal.    KIDNEYS/BLADDER: The bilateral kidneys enhance symmetrically without evidence for hydronephrosis or pyelonephritis. No renal masses or calculi noted. The bilateral ureters and urinary bladder are unremarkable.    BOWEL: Marked fluid-filled and gas-filled stomach with mild thickening of the distal stomach at the pylorus. Clinical correlation for gastric outlet obstruction recommended.  Endoscopic evaluation recommended. There is some mild thickening and mild   enhancement of small bowel in the left lower quadrant. Previously there is an obstruction in this region and this may be postobstructive changes and/or  enteritis.    LYMPH NODES: No lymphadenopathy.    VASCULATURE: No abdominal aortic aneurysm.    PELVIC ORGANS: No pelvic masses. Penile implant.    MUSCULOSKELETAL: No concerning osseous abnormalities.      Impression    IMPRESSION:   1.  Marked fluid-filled and gas-filled stomach with mild thickening of the distal stomach at the pylorus. Clinical correlation for gastric outlet obstruction recommended. Endoscopic evaluation recommended.  2.  There is some mild thickening and enhancement of small bowel in the left lower quadrant. Previously there is an obstruction in this region and this may be postobstructive changes and/or enteritis.  3.  Cholelithiasis.     XR Abdomen Port 1 View     Status: None    Narrative    Exam: XR ABDOMEN PORT 1 VIEW, 10/9/2024 10:21 AM    Indication: GOO and concern for sbo on last ct.    Comparison: 10/7/2024 CT, 9/30/2024 abdominal radiographs    Findings:   Supine frontal views of the abdomen. Scattered air-filled, nondilated  loops of small and large bowel. No appreciable pneumatosis or portal  venous gas. Mild colonic stool burden. Partially visualized penile  prosthesis. The lung bases are clear. No acute osseous abnormality.      Impression    Impression: Nonobstructive bowel gas pattern.    JOSE VITAL DO         SYSTEM ID:  [id number]   CT Abdomen Pelvis w Contrast     Status: None    Narrative    EXAMINATION: CT ABDOMEN PELVIS W CONTRAST  10/9/2024 11:45 PM      CLINICAL HISTORY: worsening abdominal pain, nausea, vomiting.  concern  for bowel obstruction    COMPARISON: CT 10/7/2024, 9/28/2024    PROCEDURE COMMENTS: CT of the abdomen was performed with iopamidol  (ISOVUE-370) solution 92 mL intravenous contrast. Coronal and sagittal  reformatted images were obtained.    FINDINGS:    Abdomen and pelvis:      Mild apparent thickening of the distal thoracic  esophagus/gastroesophageal junction (4/82). Distention of the proximal  stomach with decompressed distal stomach and  duodenum. Dilated fluid  and air-filled bowel small bowel loops with change in caliber in the  right central abdomen (4/136). Decompressed distal small bowel in the  right lower quadrant, nondistended proximal jejunal loop in the left  upper quadrant, with tapered increase in caliber in the left central  abdomen( 6/35). No pneumatosis, mesenteric venous gas or  pneumoperitoneum.      Redundant sigmoid colon. Mild to moderate predominantly right-sided  colonic stool burden; relatively decompressed left colon. Trace fluid  seen along the right paracolic gutter. Appendix is surgically absent.    No new focal liver lesion. Similar peripheral hypodensity seen in  segment 4A (4/57). Similar to a small to characterize hypodensity seen  in segment 6 (4/133). No new intrahepatic ductal dilatation.  Cholelithiasis.    Unremarkable spleen, pancreas, adrenal glands and kidneys. No biliary  ductal dilatation.  Few scattered subcentimeter pelvic nodes. Few scattered subcentimeter  mesenteric nodes. No new bulky adenopathy. Few prominent scattered  inguinal lymph nodes, similar to prior possibly reactive..    Prostate is mildly enlarged. Distended urinary bladder. Penile  prosthesis.    Lower chest: Mild patulous appearing distal thoracic esophagus. No  pericardial or pleural effusion. Scattered patchy groundglass  densities in the lung bases.      BONES/SOFT TISSUES: No aggressive osseous lesions. Unchanged  indeterminate nodular density in the anterior right lower quadrant  subcutaneous soft tissues. (4/319)        Impression    IMPRESSION:      1. Air and fluid-filled dilated small bowel loops in the central  abdomen with change in caliber in the right central abdomen,  concerning for small bowel obstruction. No pneumatosis, mesenteric  gas, pneumoperitoneum or intra-abdominal collection.  2. Patulous mid thoracic esophagus with apparent thickening involving  the distal thoracic esophagus/gastroesophageal junction, may  represent  esophagitis; consider correlation and/or EGD for further evaluation as  clinically desired.  3. Decreased distention of the stomach compared to recent CT from  10/7/2024, with decompressed appearing distal stomach; additional  findings are similar to prior including CT from 10/7/2024 as detailed  above.      Findings were discussed with Simon Guzman by Dr. Jarquin at 10/10/2024  1:31 AM and verbalized understanding of the findings.      I have personally reviewed the examination and initial interpretation  and I agree with the findings.    LAURYN FOURNIER MD         SYSTEM ID:  [id number]   XR Abdomen Port 1 View     Status: None    Narrative    Exam: X-ray Abdomen , 10/10/2024 1:38 AM    Indication: NG placed    Comparison: CT 10/9/2024, abdominal radiograph 10/9/2024    Findings:   Portable AP supine view of the abdomen. Interval placement of enteric  tube with sidehole and tip projecting over the stomach. Moderately  dilated loops of small bowel in the right and left upper quadrants. No  portal venous gas or pneumatosis. Lung bases are unremarkable.      Impression    Impression: Interval placement of enteric tube with sidehole and tip  projecting over the stomach. Dilated loops of small bowel compatible  with history of small bowel obstruction    I have personally reviewed the examination and initial interpretation  and I agree with the findings.    LAURYN FOURNIER MD         SYSTEM ID:  [id number]   Comprehensive metabolic panel     Status: Abnormal   Result Value Ref Range    Sodium 140 135 - 145 mmol/L    Potassium 3.6 3.4 - 5.3 mmol/L    Carbon Dioxide (CO2) 27 22 - 29 mmol/L    Anion Gap 13 7 - 15 mmol/L    Urea Nitrogen 13.7 8.0 - 23.0 mg/dL    Creatinine 0.71 0.67 - 1.17 mg/dL    GFR Estimate >90 >60 mL/min/1.73m2    Calcium 9.9 8.8 - 10.4 mg/dL    Chloride 100 98 - 107 mmol/L    Glucose 274 (H) 70 - 99 mg/dL    Alkaline Phosphatase 113 40 - 150 U/L    AST 20 0 - 45 U/L    ALT 18 0 - 70 U/L     Protein Total 8.2 6.4 - 8.3 g/dL    Albumin 4.5 3.5 - 5.2 g/dL    Bilirubin Total 0.8 <=1.2 mg/dL   Lipase     Status: Normal   Result Value Ref Range    Lipase 31 13 - 60 U/L   Lactic acid whole blood     Status: Normal   Result Value Ref Range    Lactic Acid 2.0 0.7 - 2.0 mmol/L   CBC with platelets and differential     Status: Abnormal   Result Value Ref Range    WBC Count 12.2 (H) 4.0 - 11.0 10e3/uL    RBC Count 5.09 4.40 - 5.90 10e6/uL    Hemoglobin 15.1 13.3 - 17.7 g/dL    Hematocrit 45.6 40.0 - 53.0 %    MCV 90 78 - 100 fL    MCH 29.7 26.5 - 33.0 pg    MCHC 33.1 31.5 - 36.5 g/dL    RDW 13.0 10.0 - 15.0 %    Platelet Count 467 (H) 150 - 450 10e3/uL    % Neutrophils 67 %    % Lymphocytes 24 %    % Monocytes 7 %    % Eosinophils 1 %    % Basophils 0 %    % Immature Granulocytes 1 %    NRBCs per 100 WBC 0 <1 /100    Absolute Neutrophils 8.2 1.6 - 8.3 10e3/uL    Absolute Lymphocytes 2.9 0.8 - 5.3 10e3/uL    Absolute Monocytes 0.9 0.0 - 1.3 10e3/uL    Absolute Eosinophils 0.1 0.0 - 0.7 10e3/uL    Absolute Basophils 0.0 0.0 - 0.2 10e3/uL    Absolute Immature Granulocytes 0.1 <=0.4 10e3/uL    Absolute NRBCs 0.0 10e3/uL   Comprehensive metabolic panel     Status: Abnormal   Result Value Ref Range    Sodium 136 135 - 145 mmol/L    Potassium 3.8 3.4 - 5.3 mmol/L    Carbon Dioxide (CO2) 23 22 - 29 mmol/L    Anion Gap 10 7 - 15 mmol/L    Urea Nitrogen 14.2 8.0 - 23.0 mg/dL    Creatinine 0.62 (L) 0.67 - 1.17 mg/dL    GFR Estimate >90 >60 mL/min/1.73m2    Calcium 8.6 (L) 8.8 - 10.4 mg/dL    Chloride 103 98 - 107 mmol/L    Glucose 246 (H) 70 - 99 mg/dL    Alkaline Phosphatase 93 40 - 150 U/L    AST 15 0 - 45 U/L    ALT 12 0 - 70 U/L    Protein Total 6.3 (L) 6.4 - 8.3 g/dL    Albumin 3.4 (L) 3.5 - 5.2 g/dL    Bilirubin Total 0.7 <=1.2 mg/dL   INR     Status: Normal   Result Value Ref Range    INR 1.05 0.85 - 1.15   CBC with platelets and differential     Status: Abnormal   Result Value Ref Range    WBC Count 7.6 4.0 - 11.0  10e3/uL    RBC Count 3.99 (L) 4.40 - 5.90 10e6/uL    Hemoglobin 11.9 (L) 13.3 - 17.7 g/dL    Hematocrit 35.9 (L) 40.0 - 53.0 %    MCV 90 78 - 100 fL    MCH 29.8 26.5 - 33.0 pg    MCHC 33.1 31.5 - 36.5 g/dL    RDW 13.0 10.0 - 15.0 %    Platelet Count 348 150 - 450 10e3/uL    % Neutrophils 54 %    % Lymphocytes 36 %    % Monocytes 7 %    % Eosinophils 3 %    % Basophils 1 %    % Immature Granulocytes 0 %    NRBCs per 100 WBC 0 <1 /100    Absolute Neutrophils 4.1 1.6 - 8.3 10e3/uL    Absolute Lymphocytes 2.7 0.8 - 5.3 10e3/uL    Absolute Monocytes 0.5 0.0 - 1.3 10e3/uL    Absolute Eosinophils 0.2 0.0 - 0.7 10e3/uL    Absolute Basophils 0.0 0.0 - 0.2 10e3/uL    Absolute Immature Granulocytes 0.0 <=0.4 10e3/uL    Absolute NRBCs 0.0 10e3/uL   Glucose by meter     Status: Abnormal   Result Value Ref Range    GLUCOSE BY METER POCT 224 (H) 70 - 99 mg/dL   Glucose by meter     Status: Abnormal   Result Value Ref Range    GLUCOSE BY METER POCT 183 (H) 70 - 99 mg/dL   Glucose by meter     Status: Abnormal   Result Value Ref Range    GLUCOSE BY METER POCT 182 (H) 70 - 99 mg/dL   Urine Drug Screen Panel     Status: Abnormal   Result Value Ref Range    Amphetamines Urine Screen Positive (A) Screen Negative    Barbituates Urine Screen Negative Screen Negative    Benzodiazepine Urine Screen Negative Screen Negative    Cannabinoids Urine Screen Negative Screen Negative    Cocaine Urine Screen Negative Screen Negative    Fentanyl Qual Urine Screen Negative Screen Negative    Opiates Urine Screen Negative Screen Negative    PCP Urine Screen Negative Screen Negative   Glucose by meter     Status: Abnormal   Result Value Ref Range    GLUCOSE BY METER POCT 160 (H) 70 - 99 mg/dL   Glucose by meter     Status: Abnormal   Result Value Ref Range    GLUCOSE BY METER POCT 169 (H) 70 - 99 mg/dL   Glucose by meter     Status: Abnormal   Result Value Ref Range    GLUCOSE BY METER POCT 119 (H) 70 - 99 mg/dL   Glucose by meter     Status: Abnormal    Result Value Ref Range    GLUCOSE BY METER POCT 267 (H) 70 - 99 mg/dL   Glucose by meter     Status: Abnormal   Result Value Ref Range    GLUCOSE BY METER POCT 266 (H) 70 - 99 mg/dL   Glucose by meter     Status: Abnormal   Result Value Ref Range    GLUCOSE BY METER POCT 111 (H) 70 - 99 mg/dL   Glucose by meter     Status: Normal   Result Value Ref Range    GLUCOSE BY METER POCT 98 70 - 99 mg/dL   Basic metabolic panel     Status: Abnormal   Result Value Ref Range    Sodium 137 135 - 145 mmol/L    Potassium 3.7 3.4 - 5.3 mmol/L    Chloride 107 98 - 107 mmol/L    Carbon Dioxide (CO2) 23 22 - 29 mmol/L    Anion Gap 7 7 - 15 mmol/L    Urea Nitrogen 8.6 8.0 - 23.0 mg/dL    Creatinine 0.72 0.67 - 1.17 mg/dL    GFR Estimate >90 >60 mL/min/1.73m2    Calcium 8.7 (L) 8.8 - 10.4 mg/dL    Glucose 82 70 - 99 mg/dL   INR     Status: Normal   Result Value Ref Range    INR 1.11 0.85 - 1.15   Partial thromboplastin time     Status: Normal   Result Value Ref Range    aPTT 33 22 - 38 Seconds   Fibrinogen activity     Status: Normal   Result Value Ref Range    Fibrinogen Activity 371 170 - 510 mg/dL   Hepatic function panel     Status: Abnormal   Result Value Ref Range    Protein Total 6.2 (L) 6.4 - 8.3 g/dL    Albumin 3.3 (L) 3.5 - 5.2 g/dL    Bilirubin Total 0.7 <=1.2 mg/dL    Alkaline Phosphatase 81 40 - 150 U/L    AST 20 0 - 45 U/L    ALT <5 0 - 70 U/L    Bilirubin Direct <0.20 0.00 - 0.30 mg/dL   Magnesium     Status: Normal   Result Value Ref Range    Magnesium 1.7 1.7 - 2.3 mg/dL   Phosphorus     Status: Normal   Result Value Ref Range    Phosphorus 3.0 2.5 - 4.5 mg/dL   CBC with platelets and differential     Status: Abnormal   Result Value Ref Range    WBC Count 6.6 4.0 - 11.0 10e3/uL    RBC Count 4.18 (L) 4.40 - 5.90 10e6/uL    Hemoglobin 12.4 (L) 13.3 - 17.7 g/dL    Hematocrit 38.1 (L) 40.0 - 53.0 %    MCV 91 78 - 100 fL    MCH 29.7 26.5 - 33.0 pg    MCHC 32.5 31.5 - 36.5 g/dL    RDW 12.8 10.0 - 15.0 %    Platelet Count  318 150 - 450 10e3/uL    % Neutrophils 59 %    % Lymphocytes 32 %    % Monocytes 6 %    % Eosinophils 2 %    % Basophils 0 %    % Immature Granulocytes 1 %    NRBCs per 100 WBC 0 <1 /100    Absolute Neutrophils 3.9 1.6 - 8.3 10e3/uL    Absolute Lymphocytes 2.1 0.8 - 5.3 10e3/uL    Absolute Monocytes 0.4 0.0 - 1.3 10e3/uL    Absolute Eosinophils 0.2 0.0 - 0.7 10e3/uL    Absolute Basophils 0.0 0.0 - 0.2 10e3/uL    Absolute Immature Granulocytes 0.0 <=0.4 10e3/uL    Absolute NRBCs 0.0 10e3/uL   Glucose by meter     Status: Normal   Result Value Ref Range    GLUCOSE BY METER POCT 97 70 - 99 mg/dL   MRSA MSSA PCR, Nasal Swab     Status: Abnormal    Specimen: Nares, Bilateral; Swab   Result Value Ref Range    MRSA Target DNA Positive (A) Negative    SA Target DNA Positive     Narrative    The [email or web address]  Xpert SA Nasal Complete assay performed in the [employer] System is a qualitative in vitro diagnostic test designed for rapid detection of Staphylococcus aureus (SA) and methicillin-resistant Staphylococcus aureus (MRSA) from nasal swabs in patients at risk for nasal colonization. The test utilizes automated real-time polymerase chain reaction (PCR) to detect MRSA/SA DNA. The Xpert SA Nasal Complete assay is intended to aid in the prevention and control of MRSA/SA infections in healthcare settings. The assay is not intended to diagnose, guide or monitor treatment for MRSA/SA infections, or provide results of susceptibility to methicillin. A negative result does not preclude MRSA/SA nasal colonization.    MRSA Culture Reflex     Status: Abnormal (Preliminary result)    Specimen: Nares, Bilateral; Swab   Result Value Ref Range    Culture Culture in progress     Culture Staphylococcus aureus MRSA (A)    CBC with Platelets & Differential     Status: Abnormal    Narrative    The following orders were created for panel order CBC with Platelets & Differential.  Procedure                               Abnormality         Status                      ---------                               -----------         ------                     CBC with platelets and d...[[id number]]  Abnormal            Final result                 Please view results for these tests on the individual orders.   CBC with platelets differential     Status: Abnormal    Narrative    The following orders were created for panel order CBC with platelets differential.  Procedure                               Abnormality         Status                     ---------                               -----------         ------                     CBC with platelets and d...[[id number]]  Abnormal            Final result                 Please view results for these tests on the individual orders.   Urine Drug Screen     Status: Abnormal    Narrative    The following orders were created for panel order Urine Drug Screen.  Procedure                               Abnormality         Status                     ---------                               -----------         ------                     Urine Drug Screen Panel[[id number]]      Abnormal            Final result                 Please view results for these tests on the individual orders.   CBC with Platelets & Differential     Status: Abnormal    Narrative    The following orders were created for panel order CBC with Platelets & Differential.  Procedure                               Abnormality         Status                     ---------                               -----------         ------                     CBC with platelets and d...[[id number]]  Abnormal            Final result                 Please view results for these tests on the individual orders.     Medications   iopamidol (ISOVUE-370) solution 99 mL (99 mLs Intravenous $Given 10/10/24 1907)   sodium chloride (PF) 0.9% PF flush 75 mL (75 mLs Intravenous $Given 10/10/24 1906)   ondansetron (ZOFRAN) injection 4 mg (4 mg Intravenous Not Given 10/10/24 1913)   morphine  (PF) injection 2 mg (2 mg Intravenous $Given 10/10/24 1927)   ketorolac (TORADOL) injection 10 mg (10 mg Intravenous $Given 10/10/24 1927)     Labs Ordered and Resulted from Time of ED Arrival to Time of ED Departure   COMPREHENSIVE METABOLIC PANEL - Abnormal       Result Value    Sodium 138      Potassium 3.7      Carbon Dioxide (CO2) 25      Anion Gap 8      Urea Nitrogen 8.8      Creatinine 0.86      GFR Estimate >90      Calcium 8.8      Chloride 105      Glucose 136 (*)     Alkaline Phosphatase 85      AST 15      ALT 10      Protein Total 6.5      Albumin 3.6      Bilirubin Total 0.8     MAGNESIUM - Abnormal    Magnesium 1.6 (*)    CBC WITH PLATELETS AND DIFFERENTIAL - Abnormal    WBC Count 7.6      RBC Count 3.91 (*)     Hemoglobin 11.8 (*)     Hematocrit 35.6 (*)     MCV 91      MCH 30.2      MCHC 33.1      RDW 12.9      Platelet Count 332      % Neutrophils 62      % Lymphocytes 28      % Monocytes 7      % Eosinophils 2      % Basophils 0      % Immature Granulocytes 0      NRBCs per 100 WBC 0      Absolute Neutrophils 4.7      Absolute Lymphocytes 2.1      Absolute Monocytes 0.5      Absolute Eosinophils 0.2      Absolute Basophils 0.0      Absolute Immature Granulocytes 0.0      Absolute NRBCs 0.0     INR - Normal    INR 1.12     PARTIAL THROMBOPLASTIN TIME - Normal    aPTT 32     LACTIC ACID WHOLE BLOOD WITH 1X REPEAT IN 2 HR WHEN >2 - Normal    Lactic Acid, Initial 0.7     LIPASE - Normal    Lipase 20     TROPONIN T, HIGH SENSITIVITY - Normal    Troponin T, High Sensitivity 19     ETHYL ALCOHOL LEVEL - Normal    Alcohol ethyl <0.01     TROPONIN T, HIGH SENSITIVITY - Normal    Troponin T, High Sensitivity 16     ROUTINE UA WITH MICROSCOPIC REFLEX TO CULTURE     CT Abdomen Pelvis w Contrast   Preliminary Result   RESIDENT PRELIMINARY INTERPRETATION   IMPRESSION:    1. Prominent borderline dilated loops of small bowel in the lower   abdomen with no definitive transition point, may be suggestive of a    developing adynamic ileus.   2. Cholelithiasis with the gallstone near the gallbladder neck,   without acute cholecystitis.   3. Diverticulosis without acute diverticulitis.             Critical care was not performed.     Medical Decision Making  The patient's presentation was of high complexity (an acute health issue posing potential threat to life or bodily function).    The patient's evaluation involved:  review of 1 test result(s) ordered prior to this encounter (see separate area of note for details)  ordering and/or review of 1 test(s) in this encounter (see separate area of note for details)    The patient's management necessitated high risk (a decision regarding hospitalization).    Assessment & Plan    Concern for worsening small bowel obstruction or perforation from EGD performed today.     plan CT abdomen pelvis labs nausea and pain medicine. will reevaluate.      ------------------    8:45 PM -I contacted with triage medicine hospitalist who has reviewed the discharge summary along with myself and we can currently do not see any reason patient would need to be readmitted given that his repeat CT scan in this emergency encounter shows no signs of an SBO but rather an ileus and patient is hungry and wanting to eat.  Will provide p.o. trial prior to considering admission to hospital medicine    945p: Successfully taking p.o. while in the waiting room.  Currently sleeping comfortably.  P.o. trial passed, I have considered hospital admission but will discharge with strict return precaution            I have reviewed the nursing notes. I have reviewed the findings, diagnosis, plan and need for follow up with the patient.    New Prescriptions    No medications on file       Final diagnoses:   Generalized abdominal pain   S/P endoscopy   ILandon, am serving as a trained medical scribe to document services personally performed by Thanh Avila MD based on the provider's statements to me on October 10,  2024.  This document has been checked and approved by the attending provider.    I, Thanh Avila MD, was physically present and have reviewed and verified the accuracy of this note documented by Landon Schrader medical scribe.      Thanh Avila MD  formerly [facility] EMERGENCY DEPARTMENT  10/10/2024     Thanh Avila MD  10/10/24 1918       Thanh Avila MD  10/10/24 2019

## 2024-10-10 NOTE — PROGRESS NOTES
Hospital Medicine  NG tube placement ok - is in the stomach though in the upper left / body of stomach.  Ok to use.  Simon Guzman MD

## 2024-10-10 NOTE — ANESTHESIA POSTPROCEDURE EVALUATION
Patient: Andrew Collier    Procedure: Procedure(s):  Esophagoscopy, gastroscopy, duodenoscopy (EGD), combined       Anesthesia Type:  General    Note:  Disposition: Inpatient   Postop Pain Control: Uneventful            Sign Out: Well controlled pain   PONV: No   Neuro/Psych: Uneventful            Sign Out: Acceptable/Baseline neuro status   Airway/Respiratory: Uneventful            Sign Out: Acceptable/Baseline resp. status   CV/Hemodynamics: Uneventful            Sign Out: Acceptable CV status; No obvious hypovolemia; No obvious fluid overload   Other NRE: NONE   DID A NON-ROUTINE EVENT OCCUR?            Last vitals:  Vitals Value Taken Time   /81 10/10/24 1404   Temp     Pulse 95 10/10/24 1407   Resp 16 10/10/24 1407   SpO2 100 % 10/10/24 1407   Vitals shown include unfiled device data.    Electronically Signed By: Carlyn Lozano MD  October 10, 2024  2:07 PM

## 2024-10-10 NOTE — OR NURSING
Pt in PACU after procedure today. Pt removed all monitoring equipment & IV access, refusing any assessments or cares, & becoming increasingly agitated. Pt expressing repeated desire to leave the hospital. At this time pt is alert and oriented. MD [facility] notified, came to PACU to assess patient and discuss discharge. Retrieved belongings from inpatient unit & left AMA.

## 2024-10-10 NOTE — ED TRIAGE NOTES
"PT reports \"my bowel is obstructed, I am in pain\". Pt seen in the hospital very recently including an EGD that was performed today with notes in pt's e-chart.     Triage Assessment (Adult)       Row Name 10/10/24 5337          Triage Assessment    Airway WDL WDL        Respiratory WDL    Respiratory WDL WDL        Skin Circulation/Temperature WDL    Skin Circulation/Temperature WDL WDL        Cardiac WDL    Cardiac WDL WDL     Cardiac Rhythm NSR        Peripheral/Neurovascular WDL    Peripheral Neurovascular WDL WDL        Cognitive/Neuro/Behavioral WDL    Cognitive/Neuro/Behavioral WDL WDL                     "

## 2024-10-10 NOTE — BRIEF OP NOTE
Gastroenterology Endoscopy Suite Brief Operative Note    Procedure:  Upper endoscopy   Post-operative diagnosis:  Esophagitis   Staff Physician:  Dr. Bhatti   Fellow/Assistant(s):  Toyin Fierro MD    Specimens:  Please see final procedure note for further details.   Findings:  - LA grade B esophagitis.  - Hill Grade II hiatal hernia.  - No gastric outlet obstruction.   Complications:  None.   Condition:  Stable   Recommendations  Diet:  Return to previous diet  - Omeprazole 40 mg BID PO to treat esophagitis.  - GI to sign off.     Agree with above  Nico Bhatti DO   of Medicine  Director, Esophageal Disorders Program  Director of Endoscopy  Division of Gastroenterology, Hepatology, and Nutrition  [facility]

## 2024-10-10 NOTE — ANESTHESIA CARE TRANSFER NOTE
Patient: Andrew Collier    Procedure: Procedure(s):  Esophagoscopy, gastroscopy, duodenoscopy (EGD), combined       Diagnosis: Gastric outlet obstruction [K31.1]  Diagnosis Additional Information: No value filed.    Anesthesia Type:   General     Note:    Oropharynx: oropharynx clear of all foreign objects  Level of Consciousness: awake  Oxygen Supplementation: face mask    Independent Airway: airway patency satisfactory and stable  Dentition: dentition unchanged  Vital Signs Stable: post-procedure vital signs reviewed and stable  Report to RN Given: handoff report given  Patient transferred to: PACU    Handoff Report: Identifed the Patient, Identified the Reponsible Provider, Reviewed the pertinent medical history, Discussed the surgical course, Reviewed Intra-OP anesthesia mangement and issues during anesthesia, Set expectations for post-procedure period and Allowed opportunity for questions and acknowledgement of understanding      Vitals:  Vitals Value Taken Time   /81 10/10/24 1404   Temp     Pulse 100 10/10/24 1418   Resp 22 10/10/24 1418   SpO2 100 % 10/10/24 1409   Vitals shown include unfiled device data.    Electronically Signed By: VIKASH Whaley CRNA  October 10, 2024  2:20 PM

## 2024-10-10 NOTE — PROGRESS NOTES
Care Management Follow Up    Length of Stay (days): 2  Expected Discharge Date: 10/11/2024  Concerns to be Addressed: homelessness     Patient plan of care discussed at interdisciplinary rounds: Yes  Anticipated Discharge Disposition: Homeless  Anticipated Discharge Services: None  Anticipated Discharge DME: None  Patient/family educated on Medicare website which has current facility and service quality ratings: no  Education Provided on the Discharge Plan: No  Patient/Family in Agreement with the Plan: Yes   Referrals Placed by CM/SW: N/A    Private pay costs discussed: Not applicable  Discussed  Partnership in Safe Discharge Planning  document with patient/family: No   Handoff Completed: No, handoff not indicated or clinically appropriate    Additional Information:  8:32 -- [employer] student called [employer] internal appointment scheduling to set up follow-up appointment for pt at [facility]. Appointment has been scheduled for Tuesday, 10/22 at 2:15pm with nurse practitioner Harleen Garrido.  9:46 -- [employer] student left vm with [facility] ([phone]) to inquire about access to a tent.  9:47 -- [employer] student called [facility] ([phone]) to inquire about a reservation at a [place] adult shelter, received an automated message that call window opens at 10:00am.  12:56 -- [employer] student spoke via phone call with [facility] representative, who said that patient needs to call [phone] to do an intake.     Next Steps: SW will call shelter intake line to do an intake with pt. SW will follow up with [facility] (shelter placement) regarding intake and [facility] (access to a tent).     _____________________________________    Keke GARCIAY2 Student  [facility]  Phone: [phone]  Social Work & Care Management Department   _____________________________________    This case is being supervised by Clinical Social Workers Elena Velásquez, RADHA, JOMAR and/or Sharon  RADHA Nguyen, LGSW

## 2024-10-10 NOTE — PLAN OF CARE
Goal Outcome Evaluation:      Plan of Care Reviewed With: patient    Overall Patient Progress: decliningOverall Patient Progress: declining    Outcome Evaluation: Pt c/o pain 9/10 and emesis x3, was assessed by provider and was given IV dilaudid 0.5mg 1x dose and new order for IV Zofran. PT to CT for abd scan, NG placement also planned for tonight. 100ml/hr NS when back from CT. New L PIV. Pt has not been able to provide a stool sample; hat in toilet. Initially refused meds, BG checks, vitals but was agreeable after order for pain meds.

## 2024-10-10 NOTE — ANESTHESIA PROCEDURE NOTES
Airway       Patient location during procedure: OR       Procedure Start/Stop Times: 10/10/2024 1:38 PM  Staff -        CRNA: Anna Miranda APRN CRNA       Performed By: resident  Consent for Airway        Urgency: elective  Indications and Patient Condition       Indications for airway management: tammy-procedural       Induction type:RSI       Mask difficulty assessment: 0 - not attempted    Final Airway Details       Final airway type: endotracheal airway       Successful airway: ETT - single and Oral  Endotracheal Airway Details        ETT size (mm): 7.5       Cuffed: yes       Cuff volume (mL): 10       Successful intubation technique: video laryngoscopy       VL Blade Size: Glidescope 3       Grade View of Cords: 1       Adjucts: stylet       Position: Right       Measured from: lips       Secured at (cm): 21       Bite block used: None    Post intubation assessment        Placement verified by: capnometry and chest rise        Number of attempts at approach: 1       Number of other approaches attempted: 0       Secured with: tape       Ease of procedure: easy       Dentition: Unchanged and Intact    Medication(s) Administered   Medication Administration Time: 10/10/2024 1:38 PM    Additional Comments       Intubation done by David Blanco (Resident)

## 2024-10-10 NOTE — PROGRESS NOTES
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      The GI Luminal Service is consulted regarding concern for gastric outlet obstruction.         # Gastric Outlet Obstruction  # Abnormal CT Scan of the GI Tract  CT Abdomen/Pelvis with Contrast on 10/7/2024 reports marked fluid-filled and gas-filled stomach with mild thickening of the distal stomach at the pylorus, concerning for peptic ulcer versus malignant ulceration at the pylorus leading gastric outlet obstruction and subsequent stomach dilation. On admission, recommended placing and nasogastric (NG) tube to low intermittent suction (LIS) for gastric decompression. Patient declined NGT.     Stomach distention appears improved on repeat AXR today 10/9 though assessment limited by plain film. Given stomach distention improvement, planned to perform EGD today given patient reported being strict NPO today. Bedside SENG Chance notified GI team that patient was observed eating pudding from personal backpack this morning at 0800 and is therefore not strict NPO and procedure cannot be completed today per anesthesia guidelines.      Repeat CT Abdomen/Pelvis with Contrast 10/9/2024 reports patulous mid  thoracic esophagus with apparent thickening involving the distal thoracic esophagus/gastroesophageal junction. An NGT was placed last evening for concern for small bowel obstruction for decompression; however patient self-removed NGT.     10/10/2024 interval update: Patient reports remaining strict NPO today and denies any GI symptoms. Plan for EGD for further evaluation for the mild thickening at the gastric pylorus and apparent esophageal thickening.         # Intermittent Left-Sided Abdominal Pain  # Recurrent Small Bowel Obstructions  - CT Abdomen/Pelvis with Contrast on 10/7/2024 reports some mild thickening and enhancement of the small bowel in the left lower quadrant noting previously there is an obstruction in the region so may be post-obstructive changes and/or enteritis, cholelithiasis.     Patient with ongoing intermittent left-sided abdominal pain and reportedly no bowel movement in 3 days.   - Repeat CT Abdomen/Pelvis with Contrast 10/9/2024 reports air and fluid-filled dilated small bowel loops in the central abdomen with change in caliber of the right central abdomen concerning for small bowel obstruction.    An NGT was placed last evening for concern for small bowel obstruction for decompression; however patient self-removed NGT.    Consider General Surgery consult for management of recurrent small bowel obstruction and prior to advancing diet, consider Gastrografin Upper GI Series with Small Bowel Follow-Through to assess the small bowel area with enhancement.       # Methamphetamine Abuse  Patient denied prior to admission amphetamine use. 10/8/2024 Urine Drug Screen POSITIVE for Amphetamines. Methamphetamines intoxication versus withdrawal could lead to non-specific symptoms of nausea with emesis and abdominal pain as well. Consider Addiction Medicine consult.        Recommendations:  -- Continue strict NPO status given concern for gastric outlet obstruction and plan for EGD today with Anesthesia  for further evaluation.  - See EGD procedure reports for procedural findings and post-procedure recommendations.      -- Continue IV PPI 40 mg BID.      -- Collect H. Pylori stool antigen.      -- Strict documentation of rectal output.  - Appreciate detailed documentation of stool appearance, including color, consistency, frequency and amount.   - Consider smearing stool thinly on white paper towel to distinguish color.     -- General surgery consult for management of recurrent small bowel obstruction.      -- Maintain 2 large bore IVs, 18G or larger.  -- Continue Supportive Cares  - Adequate volume resuscitation with IVF, pRBCs.  - Monitor Hemoglobin closely. Transfuse to keep Hgb > 7 g/dL from GI standpoint.   - Monitor Platelets closely. Keep PLT > 50 10e3/uL from GI standpoint.  - Maintain hemodynamics: MAP >65 mmHg and HR <100.  - Monitor and optimize electrolytes.  - Reposition every 30 minutes while awake.  -- Avoid NSAIDs.  -- Analgesics/Antiemetics per primary team.  -- If the patient experiences overt GI bleeding with hemodynamic instability, please page the GI Luminal Service (listed on [facility]).         Outpatient:  -- No outpatient GI Clinic follow-up necessary.        COVID status: not tested this admission.     Discussed with Dr. Lavell Loyola - Primary Medicine Team.    Thank you for involving us in this patient's care. Please do not hesitate to contact the GI service with any questions or concerns.     The patient was discussed and plan agreed upon with Dr. Preet Bhatti, GI Luminal Service staff physician.    Overall time spent on the date of this encounter preparing to see the patient (including chart review of available notes, clinical status events, imaging and labs); discussing, ordering, coordinating recommended medications, tests or procedures; communicating with other health care professionals; and documenting the above clinical information in the electronic medical record was 45  minutes.    Caitei Del Cid PA-C  Inpatient Gastroenterology Service  [facility]  Text Page  _______________________________________________________________      Subjective: Nursing notes and 24hr events reviewed.     Patient seen and examined at 1045.     Patient reports nothing to eat or drink today. Feels hungry.     Denies nausea, vomiting, acid reflux, heartburn, abdominal pain.     Last bowel movement 3 days ago.       ROS:   4 pt ROS negative unless noted in subjective.     Objective:  Blood pressure 120/79, pulse 70, temperature 98.4  F (36.9  C), temperature source Oral, resp. rate 18, SpO2 99%.    General: 58 year old male lying in bed in NAD. Appears comfortable.  Answers appropriately.   HEENT: Head is AT/NC. Sclera anicteric.   Lungs: No increased work of breathing, speaking in full sentences, equal chest rise. On Room Air.   Heart: Regular rate. Peripheral perfusion intact.  Abdomen: Soft, non-tender, non-distended. No peritoneal signs.  Extremities: WWP.  Musculoskeletal: No gross deformity.  Skin: No jaundice or rash on exposed skin.  Neurologic: Grossly non-focal.  CN 2-12 grossly intact.   Mental status/Psych: A&O. Asks/answers questions appropriately. Flat Affect. +minimally interactive.      Previous Procedures:     6/6/2022 - EGD - Dr. Kecia Benítez  Indications:           Suspected gastric outlet obstruction   Patient Profile:       This is a 54 year old male with a PMH of HIV                          infection, recurrent small bowel obstruction is seen                          by GI for an EGD to exclude GOO.   Findings:       LA Grade B (one or more mucosal breaks greater than 5 mm, not extending        between the tops of two mucosal folds) esophagitis with no bleeding was        found.        A small hiatal hernia was present.        There is no endoscopic evidence of bleeding, erythema or inflammatory        changes suggestive of gastritis, mucosal  abnormalities or ulceration in        the entire examined stomach.        The examined duodenum was normal. Biopsies were taken with a cold        forceps for histology.                                                                                    Moderate Sedation:        Moderate (conscious) sedation was administered by the endoscopy nurse        and supervised by the endoscopist. The patient's oxygen saturation,        heart rate, blood pressure and response to care were monitored. Total        physician intraservice time was 18 minutes.   Impression:            - LA Grade B reflux esophagitis with no bleeding.                          - Small hiatal hernia.                          - Normal examined duodenum. Biopsied.           Final Diagnosis   A.  DUODENUM, BIOPSY:  Acute duodenitis; uncertain etiology; no evidence of celiac disease  (See Comment)     B.  STOMACH, ANTRUM, BIOPSY:  Gastric antral & fundic mucosa with fundic PPI-related changes; no significant inflammation; negative for H. pylori, intestinal metaplasia, or dysplasia       Electronically signed by Bhavani Ulloa MD on 6/7/2022 at 12:45 PM   Comment   [facility]MA[facility]   Duodenitis etiology is uncertain but although an infective agent is not identified, if possible additional stool examination to rule out entities such as Giardia and others should be considered, as well as excluding medication and/or peptic related sources.            9/9/2020 - Examination under anesthesia with high-definition anal microscopy, biopsy and fulguration and anal dysplasia - Colorectal Surgery Dr. Thanh Lundberg  Procedure Date: 09/09/2020      PREOPERATIVE DIAGNOSIS:  Anal dysplasia.      POSTOPERATIVE DIAGNOSIS:  Anal dysplasia.      PROCEDURE:  Examination under anesthesia with high-definition anal microscopy, biopsy and fulguration of anal dysplasia.      HISTORY:  This 52-year-old HIV positive man was seen in the Colorectal Surgery Clinic by Carrie Mckeon  VIKASH in 06/2018.  Anal cytology at that time showed LSIL.  The patient could not tolerate microscopy in the clinic and was accordingly referred for operative microscopy.  This proved difficult to schedule due to the patient having been homeless for some period of time.  He now returns for anal microscopy and treatment of any identified dysplasia under anesthesia.  I discussed the risks and alternatives of the procedure in detail to the patient.  I answered all of his questions to his stated satisfaction.  He expresses understanding and provided written informed consent.      SURGEON:  Thanh Lundberg MD.      ASSISTANT:   amy Dumont MD.      DESCRIPTION OF PROCEDURE:  With the patient in the left lateral decubitus position, under intravenous sedation by Anesthesia, the perianal skin was prepped and draped in sterile fashion.  A timeout was performed and the patient and procedure confirmed.  Local infiltration of 0.25% with epinephrine was utilized and a satisfactory perianal block was obtained.  The patient had numerous secretions that required turning off his sedation until these could be controlled.  He is probably a safer bet to perform his next microscopy under general anesthesia where we will have better control of his airway.  He very briefly desaturated, but responded rapidly to mask and had no ongoing problems.  The anal canal and perianal skin were soaked with 5% acetic acid and high-definition anal microscopy was performed using the colposcope.  There were no external lesions.  Eversion of the anal canal showed diffuse acetowhitening in moderate to coarse punctation extending all the way to the anal margin.  Multiple representative biopsies were obtained from the anal margin and the anal canal and sent for permanent section.  The most intense areas of acetowhitening and punctation were fulgurated using electrocautery.  There were milder areas of acetowhitening that I chose not to address due to the  moderately extensive fulguration of the anal canal that I already performed.  I feel it would be safer to await biopsies and repeat microscopy in several months' time to continue with fulguration after he has healed the current procedure.  The procedure was accordingly terminated and the patient was returned to the recovery area in satisfactory condition.  Sponge, needle and instrument counts were reported as correct at the conclusion of the case x2.  There were no immediate operative complications.      FINAL DIAGNOSIS:  A. Anus, left posterior margin, anoscopy:  - Low grade squamous intraepithelial lesion (formerly AIN 1)  - No evidence of high-grade intraepithelial lesion or malignancy    B. Anus, left anterior margin, anoscopy:  - Low grade squamous intraepithelial lesion (formerly AIN 1)  - No evidence of high-grade intraepithelial lesion or malignancy    C. Anus, right medial margin, anoscopy:  - Low grade squamous intraepithelial lesion (formerly AIN 1)  - No evidence of high-grade intraepithelial lesion or malignancy    D. Anus, right medial margin, anoscopy:  - Low grade squamous intraepithelial lesion (formerly AIN 1)  - No evidence of high-grade intraepithelial lesion or malignancy    E. Anus, right posterior anal canal, anoscopy  - Low grade squamous intraepithelial lesion (formerly AIN 1)  - No evidence of high-grade intraepithelial lesion or malignancy    F. Anus, anterior anal canal. anoscopy:  - High grade squamous intraepithelial lesion (formerly AIN 2)  - No evidence of invasive malignancy         1/22/2016 - Colonoscopy - Dr. Clark Saini  Indications:         Chronic diarrhea   Patient Profile:     47 yo Male with AIDS (CD4 24), CMV viremia, CNS                        toxoplasmosis, and anal lesions with HSV+ stain with                        intermittent diarrhea and abdominal pain. Found to have                        partial SBO with thickening in the distal jejunum which                         has resolved on interval imaging. Colonoscopy performed                        for further evaluation.   Findings:       The perianal and digital rectal examinations were normal.        The colon (entire examined portion) appeared normal. Biopsies were taken        with a cold forceps from the right colon and left colon for evaluation        of microscopic colitis.        The ileum, 10-15 cm from the ileocecal valve appeared normal.                                                                                    Impression:          - The entire examined colon is normal. Biopsied to                        evaluate for microscopic colitis.                        - The examined portion of the ileum was normal.                        - Based on this exam, no etiology of small bowel                        thickening was noted. We were unable to reach the area                        of concern. If further evaluation needed, would consider                        single balloon enteroscopy, versus capsule endoscopy,                        versus repeat imaging to see if abnormality persists (as                        already improving on CT).      FINAL DIAGNOSIS:   A. Large colon, right colon, biopsy:   -Colonic mucosa with no significant histologic abnormality   -See comment     B. Large bowel, left colon, biopsy:   -Colonic mucosa with no significant histologic abnormality   -See comment     COMMENT:   Some focal areas showed slightly increased subepithelial collagen.   Trichrome stain performed on both specimens with appropriate controls   show no conclusive evidence of thickened subepithelial collagen layer.            1/18/2016 - Small Bowel Enteroscopy - Dr. Marcus Nunez  Indications:         Abnormal abdominal CT   Patient Profile:     49 YO M with history of AIDS (CD4 count 24),                        disseminated varicella, CMV viremia, CNS toxoplasmosis,                        anal lesion with +HSV stain now  with recurrent                        admissions for crampy lower abdominal pain and loose                        stools. Upon admission actually had no stools. CT scan                        showed question of partial SBO. Thickening seen in the                        distal jejunum near area of transition point. Stool                        infectious studies negative for infection thus far. Now                        patient has antegrade bowel function. Reviewing CT shows                        question of some thickening in duodenum. Now attempting                        EGD with push enteroscopy to evaluate upper GI tract to                        distal jejunum and obtain biopsies.      Impression:          - Z-line regular, 35 cm from the incisors.                        - LA Grade A reflux esophagitis.                        - White plaques mucosa in the esophagus. Biopsied.                        - Striped and patchy moderately erythematous mucosa                        without bleeding was found in the gastric body and in                        the gastric antrum. Some areas appeared with more                        discrete beefy red erythema. There were a few scattered                        small erosions. Biopsied.                        - Normal examined duodenum. Biopsied.                        - The examined portion of the jejunum was normal.                        Biopsied.                        - Many biopsies were taken. Please see details of                        biopsies above under findings      FINAL DIAGNOSIS:  A. Small bowel, biopsy:  -Small intestinal mucosa with no significant histologic abnormality    B. Stomach, biopsy:  -Features of reactive gastropathy  -Negative for intestinal metaplasia or dysplasia    C. Small bowel, jejunum, biopsy:  -Small intestinal mucosa with no significant histologic abnormality  -Negative for HSV and CMV by immunostaining  -PAS stain negative for fungal  elements    D. Small bowel, duodenum, biopsy:  -Duodenal mucosa with no significant histologic abnormality  -Negative for HSV and CMV by immunostaining  -PAS stain negative for fungal elements    E. Stomach, biopsy:  -Gastric mucosa with no significant inflammation  -No H. Pylori like organisms identified on routine sections  -Negative for intestinal metaplasia or dysplasia  -Negative for HSV, CMV and HHV 8 by immunostaining  -GMS stain negative for fungal elements    F. Esophagus, biopsy:  -Squamous epithelium with superficial ulceration, acute inflammation and  ballooning degeneration  -Negative for HSV and CMV by immunostaining  -PAS stain negative for fungal elements     LABS:  BMP  Recent Labs   Lab 10/10/24  0543 10/09/24  1608 10/09/24  1115 10/09/24  0840 10/08/24  0603 10/08/24  0601 10/07/24  2058   NA  --   --   --   --   --  136 140   POTASSIUM  --   --   --   --   --  3.8 3.6   CHLORIDE  --   --   --   --   --  103 100   LINDA  --   --   --   --   --  8.6* 9.9   CO2  --   --   --   --   --  23 27   BUN  --   --   --   --   --  14.2 13.7   CR  --   --   --   --   --  0.62* 0.71   * 266* 267* 119*   < > 246* 274*    < > = values in this interval not displayed.     CBC  Recent Labs   Lab 10/08/24  0738 10/07/24  2058   WBC 7.6 12.2*   RBC 3.99* 5.09   HGB 11.9* 15.1   HCT 35.9* 45.6   MCV 90 90   MCH 29.8 29.7   MCHC 33.1 33.1   RDW 13.0 13.0    467*     INR  Recent Labs   Lab 10/08/24  0601   INR 1.05     LFTs  Recent Labs   Lab 10/08/24  0601 10/07/24  2058   ALKPHOS 93 113   AST 15 20   ALT 12 18   BILITOTAL 0.7 0.8   PROTTOTAL 6.3* 8.2   ALBUMIN 3.4* 4.5      PANC  Recent Labs   Lab 10/07/24  2058   LIPASE 31         IMAGING:    X-ray Abdomen , 10/10/2024 1:38 AM  Indication: NG placed     Comparison: CT 10/9/2024, abdominal radiograph 10/9/2024     Findings:   Portable AP supine view of the abdomen. Interval placement of enteric  tube with sidehole and tip projecting over the stomach.  Moderately  dilated loops of small bowel in the right and left upper quadrants. No  portal venous gas or pneumatosis. Lung bases are unremarkable.                                                                   Impression: Interval placement of enteric tube with sidehole and tip  projecting over the stomach. Dilated loops of small bowel compatible  with history of small bowel obstruction      CT ABDOMEN PELVIS W CONTRAST  10/9/2024 11:45 PM       CLINICAL HISTORY: worsening abdominal pain, nausea, vomiting.  concern  for bowel obstruction     COMPARISON: CT 10/7/2024, 9/28/2024     PROCEDURE COMMENTS: CT of the abdomen was performed with iopamidol  (ISOVUE-370) solution 92 mL intravenous contrast. Coronal and sagittal  reformatted images were obtained.     FINDINGS:     Abdomen and pelvis:        Mild apparent thickening of the distal thoracic  esophagus/gastroesophageal junction (4/82). Distention of the proximal  stomach with decompressed distal stomach and duodenum. Dilated fluid  and air-filled bowel small bowel loops with change in caliber in the  right central abdomen (4/136). Decompressed distal small bowel in the  right lower quadrant, nondistended proximal jejunal loop in the left  upper quadrant, with tapered increase in caliber in the left central  abdomen( 6/35). No pneumatosis, mesenteric venous gas or  pneumoperitoneum.        Redundant sigmoid colon. Mild to moderate predominantly right-sided  colonic stool burden; relatively decompressed left colon. Trace fluid  seen along the right paracolic gutter. Appendix is surgically absent.     No new focal liver lesion. Similar peripheral hypodensity seen in  segment 4A (4/57). Similar to a small to characterize hypodensity seen  in segment 6 (4/133). No new intrahepatic ductal dilatation.  Cholelithiasis.     Unremarkable spleen, pancreas, adrenal glands and kidneys. No biliary  ductal dilatation.  Few scattered subcentimeter pelvic nodes. Few scattered  subcentimeter  mesenteric nodes. No new bulky adenopathy. Few prominent scattered  inguinal lymph nodes, similar to prior possibly reactive..     Prostate is mildly enlarged. Distended urinary bladder. Penile  prosthesis.     Lower chest: Mild patulous appearing distal thoracic esophagus. No  pericardial or pleural effusion. Scattered patchy groundglass  densities in the lung bases.     BONES/SOFT TISSUES: No aggressive osseous lesions. Unchanged  indeterminate nodular density in the anterior right lower quadrant  subcutaneous soft tissues. (4/319)                                                                   IMPRESSION:  1. Air and fluid-filled dilated small bowel loops in the central  abdomen with change in caliber in the right central abdomen,  concerning for small bowel obstruction. No pneumatosis, mesenteric  gas, pneumoperitoneum or intra-abdominal collection.  2. Patulous mid thoracic esophagus with apparent thickening involving  the distal thoracic esophagus/gastroesophageal junction, may represent  esophagitis; consider correlation and/or EGD for further evaluation as  clinically desired.  3. Decreased distention of the stomach compared to recent CT from  10/7/2024, with decompressed appearing distal stomach; additional  findings are similar to prior including CT from 10/7/2024 as detailed  above.      XR ABDOMEN PORT 1 VIEW, 10/9/2024 10:21 AM  Indication: GOO and concern for sbo on last ct.     Comparison: 10/7/2024 CT, 9/30/2024 abdominal radiographs     Findings:   Supine frontal views of the abdomen. Scattered air-filled, nondilated  loops of small and large bowel. No appreciable pneumatosis or portal  venous gas. Mild colonic stool burden. Partially visualized penile  prosthesis. The lung bases are clear. No acute osseous abnormality.                                                                  Impression: Nonobstructive bowel gas pattern.      CT ABDOMEN PELVIS W CONTRAST  LOCATION: [facility]  [facility]  DATE: 10/7/2024     INDICATION: Abdominal pain, vomiting, repeated SBO's, eval for interval change  COMPARISON: 9/28/2024  TECHNIQUE: CT scan of the abdomen and pelvis was performed following injection of IV contrast. Multiplanar reformats were obtained. Dose reduction techniques were used.  CONTRAST: 92ml isovue 370     FINDINGS:   LOWER CHEST: Minimal dependent atelectasis in the posterior lung bases.     HEPATOBILIARY: Normal contour with no significant mass. Stable focal fatty infiltration adjacent to the falciform ligament along the anterior liver. No bile duct dilatation. Calcified gallstone within the neck of the gallbladder. Gallbladder is   unremarkable.     PANCREAS: No significant mass, duct dilatation, or inflammatory change.     SPLEEN: Normal.     ADRENAL GLANDS: Normal.     KIDNEYS/BLADDER: The bilateral kidneys enhance symmetrically without evidence for hydronephrosis or pyelonephritis. No renal masses or calculi noted. The bilateral ureters and urinary bladder are unremarkable.     BOWEL: Marked fluid-filled and gas-filled stomach with mild thickening of the distal stomach at the pylorus. Clinical correlation for gastric outlet obstruction recommended.  Endoscopic evaluation recommended. There is some mild thickening and mild   enhancement of small bowel in the left lower quadrant. Previously there is an obstruction in this region and this may be postobstructive changes and/or enteritis.     LYMPH NODES: No lymphadenopathy.     VASCULATURE: No abdominal aortic aneurysm.     PELVIC ORGANS: No pelvic masses. Penile implant.     MUSCULOSKELETAL: No concerning osseous abnormalities.                                                                      IMPRESSION:   1.  Marked fluid-filled and gas-filled stomach with mild thickening of the distal stomach at the pylorus. Clinical correlation for gastric outlet obstruction recommended. Endoscopic evaluation  recommended.  2.  There is some mild thickening and enhancement of small bowel in the left lower quadrant. Previously there is an obstruction in this region and this may be postobstructive changes and/or enteritis.  3.  Cholelithiasis.

## 2024-10-10 NOTE — DISCHARGE INSTRUCTIONS
Resources for People who are Unhoused [place]*    FREE MEALS    BREAKFAST:     [address] [phone]   Serves breakfast Monday - Saturday 7:00-8:00 AM    [place] [phone]   Serves women and children Monday - Friday 8:30-9:00 AM (men with children welcome)     [facility] (Kate leonard)  [address] [phone]   Serves breakfast Monday - Thursday at 10:00 AM and on Saturday and Sunday at 9:30 AM.     LUNCH & SNACKS:     [address] [phone]  Lunch: Monday - Saturday 11:30 - 12:30 PM  Drop In Hours: Monday - Saturday 7:00 AM to   3:00 PM. Belarusian speaking staff. Showers, laundry and lockers available. Lockers are $1 for 30 days. After 30 days you must wait 3 months to rent a locker again.     [address] [phone]   Lunch is served Monday - Friday 12:00-1:00 PM and Saturday and Sunday 10:30-11:30 AM.     [facility]  [address] [facility] [phone]   Serves women lunch on Thursdays at 11:45 AM    [facility] [place] [place]   510 [facility] [phone]   Provides a gathering place where poor, homeless and economically comfortable people come to form community, work for non-violence, affirmation and responsibility. Emphasis is on spirituality through daily reflection time together, followed by lunch. Lunch is served Monday - Friday at 11:45 AM.     . [facility] [place]   [address] [facility] [phone]   Bag lunches and snacks available. Warm space is open for young people on Mondays 4:30-8:00 PM. Warm space is open to all on Thursdays 12-4:00 PM.     [facility] (Kate leonard)  [address] [place] [address] [phone]   Serves lunch Monday - Thursday at 12:00 PM and on Saturday and Sunday at 11:00 AM.      [facility]  [address] [facility] / [phone]   Lunch is served Monday - Friday 11:00-12:30 PM     [facility]  101 [facility] / [phone]   Tickets are issued on Sunday at 10:30 AM and doors open at 11:30 AM. Meal is served at 12:00 PM.    [facility]  1010 [facility] / [phone]   A nice lunch is served on Sundays after chapel, which begins at 10:00 AM. You must attend chap to go to the meal.     [facility]   2608 [facility] / [phone]   Dinner is served on Sundays, October - April from 2:30-4:30 PM. Doors open at 1:30 PM.     DINNER:     [facility]   5[address] [facility] / [phone]   Dinner is served Wednesdays 5:45-6:30 PM    [facility]  1010 [facility] / [phone]   Dinner is served 7 days a week at 6:00 PM (usually soup and sandwiches).     [facility]  1112 [facility] / [phone]   Tuesday, Friday & Saturday dinner is served after a 7:00 PM service. On the second Tuesday of the month there is a dinner at 6:00 PM.     [facility]  ** All sites serve Monday-Friday 5:30-6:30 PM except as noted.   [facility]: [phone]  220 [facility]: [phone] [address] [place] [address]   ** Serves Monday - Thursday 5:15-6:15 PM    [facility]: [phone] 2123 [facility]: [phone]   9801 [facility]: [phone] 7132 [facility]   ** Serves Tuesday and Thursday 5:00-6:00 PM     [facility] (Kate leonard)  [address]   [phone]   Dinner is served Monday - Thursday at 4:00 PM.     [facility] [place]   519 [facility]  [phone]  Dinner  is served on Monday at 6:00 PM for people under the age of 30.     These Sunday meals are served 5:00-6:00 PM:  ** First and Fifth Sundays of the Month  [facility]   ** Second Sunday of the Month  [facility] [place]   [address] [facility]   ** Third Sunday of the Month  [facility]  [address]   ** Fourth Sunday of the Month   [facility] [address] at [address]     MEN & WOMEN SHELTERS    [place] [facility]   [address]  [phone] ()  Intake: You must be on GA or SSI or you can be referred by a [facility] Access Worker, financial worker,  or other sort of counselor.   Remarks: The [facility] is a structured housing program for people with special needs who are vulnerable. There is no set limit for how long you can stay (depends on what your situation is).     [place] [phone]   Emergency Housing: Provides shelter, personal hygiene items and nutrition for homeless single adults who are receiving GA, SSI or other government benefits.   [facility] Program: Provides shelter, personal hygiene and nutrition for able-bodied adults who are employed or actively seeking employment. There is an $8 per day co-pay to stay there.     MEN ONLY SHELTERS    [facility]  [address] [place] [phone]   Single men without children who are not eligible for [facility] may stay on a mat in a secure area. Open 7 days a week from 5:00 PM to 7:00 AM. A light dinner is served. Restrooms and showers are available. Beds upstairs are available for $4 per night.     [phone]   [facility]: A secure shelter for single homeless men. Beds are available first come,  first serve each night.   Spiritual Growth Program: Provides shelter, personal hygiene items and nutrition for men who are seeking a rejuvenation of mind, body and spirit.   [place] [facility]: Residential substance abuse treatment program. Phase 1 is a 21 day program. Phase 2 is a 75 day [facility] house program.     Manuel Duarte [place] [address] [facility]  [phone]   Hours: 6:00 PM to 7:00 AM  Provides 43 beds that are available for stays up to 28 days. People who work late and have a 28 day spot can come to the shelter late. You can get an extended stay by saving 40% of your income towards housing. If you have never been to the lottery before, you can talk to shelter staff to be put on the waiting list. Breakfast, showers, weekend meals and laundry are available. Regular medical services are offered. [facility] Access workers make regular visits and staff members speak Greek. Job preparation and search program is available. You must be completely sober to stay at [facility]. See lottery procedure.    [facility]   [address] [facility]  [phone]   Hours: 6:00 PM to 7:00 AM  Provides 34 beds that are available for 28 days (some extensions are possible). Breakfast, dinner, showers, laundry, and an address to receive mail are available. Building is wheelchair accessible. Job preparation and search program is available. See lottery procedure.    [facility]   [address] [place] [address]   [phone] (day); [phone] (3-5pm, after 6pm)   Hours: 6:00 PM to 7:00 AM. Office hours are   Monday - Friday 3:00-5:00 PM.  Provides 46 beds that are available for stays up to 28 days. Dinner and simple breakfast is served. Showers, laundry, storage, savings, health clinic and advocacy are available. There is legal clinic that is held on Mondays. Extended stays may be available for those who are willing to save 40% of their income for  permanent housing and have pay stubs to verify employment. Job preparation and search program is available. See lottery procedure.     Lottery Procedure for [facility] and [facility]:  Drawings for available beds are held at the [facility], [facility] [address] [place] [address] on Monday nights at 6:30 PM. You must be signed up for the lottery before 6:30 PM. You must be sober to participate in the lottery, meaning no alcohol or drug use prior to the drawing. There will be no 1 night beds given out through individual lotteries. On Mondays a waiting list will be chosen for each shelter. People on the waiting list may receive 1 night or 28 nights depending on which shelter s list you are on. People on the waiting list should contact their respective shelter each day to see if beds area available.     SHOWERS     [facility]  [address]   [phone]  Showers and laundry are available Monday-Friday 7:00-11:00 AM and 1:00-2:30 PM.     [facility]  [address]  [phone]  Showers are available Monday-Friday 9:00-1:00 PM.    [facility]  1010 [facility]      Showers are available 7 days a week, 8:00-4:30 PM.     [facility] (Kate leonard)  [address]   [phone]  Showers are available:  Monday-Thursday 10:30-11:20 AM AND 1:30-3:00 PM, Saturday 9:30-10:30 AM, Sunday 10:30-11:00 AM     *All information subject to change without notice. This is not a complete list of community resources.

## 2024-10-10 NOTE — DISCHARGE SUMMARY
[facility]  Hospitalist Discharge Summary      Date of Admission:  10/7/2024  Date of Discharge:  No discharge date for patient encounter.  Discharging Provider: TIBURCIO LONG MD  Discharge Service: Hospitalist Service, GOLD TEAM 7    Discharge Diagnoses   SBO    Clinically Significant Risk Factors     # DMII: A1C = 11.7 % (Ref range: <5.7 %) within past 6 months       Follow-ups Needed After Discharge   None set up. Recommended to call and follow up with PCP    Unresulted Labs Ordered in the Past 30 Days of this Admission       Date and Time Order Name Status Description    10/10/2024 12:49 PM T cell subset profile In process     10/8/2024  9:16 PM MRSA Culture Reflex Preliminary         These results will be followed up by PCP    Discharge Disposition   Discharged to home. Left against medical advise. Signed paperwork.    Condition at discharge: Stable    Hospital Course   Andrew Collier is a 58 year old male admitted on 10/7/2024. He has a history of GERD, HIV, DM2, MDD, DAVID, methamphetamine abuse, and recurrent SBO who presents with abdominal pain, nausea, and vomiting. He had previously been seen on 9/28 and now represents for similar concerns of SBO vs gastric outlet obstruction. Initial plan was for GI to perform EGD on 10/9. Patient was kept npo. Initially, patient was having nausea, emesis and abd pain. NGT was originally planned, but patient refused. Since he was not actively vomiting after antiemetics, NGT to suction was held. Patient was made NPO with plans to undergo EGD. Original plan was to undergo EGD on 10/9 AM. Patient was kept NPO, but he had esau crackers and pudding in the morning. As a result, EGD was rescheduled to 10/10. During his hospitalization, patient refused several attempts or labs, nursing care and vitals. Patient underwent EGD on 10/10 which showed LA grade B esophagitis, hiatal hernia. No GOO was noted. On CT abd  pelvis on arrival, there was also concerns for an SBO. Given his clinical presentation, and since GGO was negative on EGD, general surgery was consulted. While recovering from EGD in the PACU, patient became very frustrated and stated 'you are kidnapping me'. On evaluation, patient was aplert and oriented x3. He was able to mentions risks of leaving and complications which should prompt an urgent ER return. Patient signed AMA paperwork and left on 10/10. Discussed red flag signs which should prompt return to ER (worsening abd pain, nausea, emesis, no Bms, not passing gas.    Consultations This Hospital Stay   NURSING TO CONSULT FOR VASCULAR ACCESS CARE IP CONSULT  GI LUMINAL ADULT IP CONSULT  CARE MANAGEMENT / SOCIAL WORK IP CONSULT  NURSING TO CONSULT FOR VASCULAR ACCESS CARE IP CONSULT  SPIRITUAL HEALTH SERVICES IP CONSULT  SURGERY GENERAL ADULT IP CONSULT    Code Status   Full Code    Time Spent on this Encounter   ITIBURCIO MD, personally saw the patient today and spent greater than 30 minutes discharging this patient.       TIBURCIO LONG MD  [facility]  [address] [address]  Phone: [phone]  ______________________________________________________________________    Physical Exam -- exam was done in the morning prior to EGD. Patient not allowing reexamination at the time of AMA discharge.  Vital Signs: Temp: 98.2  F (36.8  C) Temp src: Oral BP: 119/80 Pulse: 70   Resp: 18 SpO2: 99 % O2 Device: None (Room air)    Weight: 0 lbs 0 oz  General Appearance: Awake, alert, frustrated. Cognitively intact. Menta ting normally.  Respiratory: CTAb No wheezing  Cardiovascular: Normal S1, S2.  GI: nbs. Abd soft, non tender       Primary Care Physician   Physician No Ref-Primary    Discharge Orders   No discharge procedures on file.    Significant Results and Procedures   Results for orders placed or performed during the hospital encounter of  10/07/24   CT Abdomen Pelvis w Contrast    Narrative    EXAM: CT ABDOMEN PELVIS W CONTRAST  LOCATION: [facility]  DATE: 10/7/2024    INDICATION: Abdominal pain, vomiting, repeated SBO's, eval for interval change  COMPARISON: 9/28/2024  TECHNIQUE: CT scan of the abdomen and pelvis was performed following injection of IV contrast. Multiplanar reformats were obtained. Dose reduction techniques were used.  CONTRAST: 92ml isovue 370    FINDINGS:   LOWER CHEST: Minimal dependent atelectasis in the posterior lung bases.    HEPATOBILIARY: Normal contour with no significant mass. Stable focal fatty infiltration adjacent to the falciform ligament along the anterior liver. No bile duct dilatation. Calcified gallstone within the neck of the gallbladder. Gallbladder is   unremarkable.    PANCREAS: No significant mass, duct dilatation, or inflammatory change.    SPLEEN: Normal.    ADRENAL GLANDS: Normal.    KIDNEYS/BLADDER: The bilateral kidneys enhance symmetrically without evidence for hydronephrosis or pyelonephritis. No renal masses or calculi noted. The bilateral ureters and urinary bladder are unremarkable.    BOWEL: Marked fluid-filled and gas-filled stomach with mild thickening of the distal stomach at the pylorus. Clinical correlation for gastric outlet obstruction recommended.  Endoscopic evaluation recommended. There is some mild thickening and mild   enhancement of small bowel in the left lower quadrant. Previously there is an obstruction in this region and this may be postobstructive changes and/or enteritis.    LYMPH NODES: No lymphadenopathy.    VASCULATURE: No abdominal aortic aneurysm.    PELVIC ORGANS: No pelvic masses. Penile implant.    MUSCULOSKELETAL: No concerning osseous abnormalities.      Impression    IMPRESSION:   1.  Marked fluid-filled and gas-filled stomach with mild thickening of the distal stomach at the pylorus. Clinical correlation for gastric outlet  obstruction recommended. Endoscopic evaluation recommended.  2.  There is some mild thickening and enhancement of small bowel in the left lower quadrant. Previously there is an obstruction in this region and this may be postobstructive changes and/or enteritis.  3.  Cholelithiasis.     XR Abdomen Port 1 View    Narrative    Exam: XR ABDOMEN PORT 1 VIEW, 10/9/2024 10:21 AM    Indication: GOO and concern for sbo on last ct.    Comparison: 10/7/2024 CT, 9/30/2024 abdominal radiographs    Findings:   Supine frontal views of the abdomen. Scattered air-filled, nondilated  loops of small and large bowel. No appreciable pneumatosis or portal  venous gas. Mild colonic stool burden. Partially visualized penile  prosthesis. The lung bases are clear. No acute osseous abnormality.      Impression    Impression: Nonobstructive bowel gas pattern.    JOSE VITAL DO         SYSTEM ID:  [id number]   CT Abdomen Pelvis w Contrast    Narrative    EXAMINATION: CT ABDOMEN PELVIS W CONTRAST  10/9/2024 11:45 PM      CLINICAL HISTORY: worsening abdominal pain, nausea, vomiting.  concern  for bowel obstruction    COMPARISON: CT 10/7/2024, 9/28/2024    PROCEDURE COMMENTS: CT of the abdomen was performed with iopamidol  (ISOVUE-370) solution 92 mL intravenous contrast. Coronal and sagittal  reformatted images were obtained.    FINDINGS:    Abdomen and pelvis:      Mild apparent thickening of the distal thoracic  esophagus/gastroesophageal junction (4/82). Distention of the proximal  stomach with decompressed distal stomach and duodenum. Dilated fluid  and air-filled bowel small bowel loops with change in caliber in the  right central abdomen (4/136). Decompressed distal small bowel in the  right lower quadrant, nondistended proximal jejunal loop in the left  upper quadrant, with tapered increase in caliber in the left central  abdomen( 6/35). No pneumatosis, mesenteric venous gas or  pneumoperitoneum.      Redundant sigmoid colon. Mild to  moderate predominantly right-sided  colonic stool burden; relatively decompressed left colon. Trace fluid  seen along the right paracolic gutter. Appendix is surgically absent.    No new focal liver lesion. Similar peripheral hypodensity seen in  segment 4A (4/57). Similar to a small to characterize hypodensity seen  in segment 6 (4/133). No new intrahepatic ductal dilatation.  Cholelithiasis.    Unremarkable spleen, pancreas, adrenal glands and kidneys. No biliary  ductal dilatation.  Few scattered subcentimeter pelvic nodes. Few scattered subcentimeter  mesenteric nodes. No new bulky adenopathy. Few prominent scattered  inguinal lymph nodes, similar to prior possibly reactive..    Prostate is mildly enlarged. Distended urinary bladder. Penile  prosthesis.    Lower chest: Mild patulous appearing distal thoracic esophagus. No  pericardial or pleural effusion. Scattered patchy groundglass  densities in the lung bases.      BONES/SOFT TISSUES: No aggressive osseous lesions. Unchanged  indeterminate nodular density in the anterior right lower quadrant  subcutaneous soft tissues. (4/319)        Impression    IMPRESSION:      1. Air and fluid-filled dilated small bowel loops in the central  abdomen with change in caliber in the right central abdomen,  concerning for small bowel obstruction. No pneumatosis, mesenteric  gas, pneumoperitoneum or intra-abdominal collection.  2. Patulous mid thoracic esophagus with apparent thickening involving  the distal thoracic esophagus/gastroesophageal junction, may represent  esophagitis; consider correlation and/or EGD for further evaluation as  clinically desired.  3. Decreased distention of the stomach compared to recent CT from  10/7/2024, with decompressed appearing distal stomach; additional  findings are similar to prior including CT from 10/7/2024 as detailed  above.      Findings were discussed with Simon Guzman by Dr. Jarquin at 10/10/2024  1:31 AM and verbalized understanding  of the findings.      I have personally reviewed the examination and initial interpretation  and I agree with the findings.    LAURYN FOURNIER MD         SYSTEM ID:  [id number]   XR Abdomen Port 1 View    Narrative    Exam: X-ray Abdomen , 10/10/2024 1:38 AM    Indication: NG placed    Comparison: CT 10/9/2024, abdominal radiograph 10/9/2024    Findings:   Portable AP supine view of the abdomen. Interval placement of enteric  tube with sidehole and tip projecting over the stomach. Moderately  dilated loops of small bowel in the right and left upper quadrants. No  portal venous gas or pneumatosis. Lung bases are unremarkable.      Impression    Impression: Interval placement of enteric tube with sidehole and tip  projecting over the stomach. Dilated loops of small bowel compatible  with history of small bowel obstruction    I have personally reviewed the examination and initial interpretation  and I agree with the findings.    LAURYN FOURNIER MD         SYSTEM ID:  [id number]     *Note: Due to a large number of results and/or encounters for the requested time period, some results have not been displayed. A complete set of results can be found in Results Review.       Discharge Medications   Current Discharge Medication List        CONTINUE these medications which have NOT CHANGED    Details   bictegravir-emtricitabine-tenofovir (BIKTARVY) -25 MG per tablet Take 1 tablet by mouth daily  Qty: 30 tablet, Refills: 2    Associated Diagnoses: Human immunodeficiency virus I infection (H)      blood glucose (NO BRAND SPECIFIED) lancets standard Use to test blood sugar 1 time daily or as directed.  Qty: 100 Lancet, Refills: 4    Associated Diagnoses: Type 2 diabetes mellitus with hyperglycemia, with long-term current use of insulin (H)      blood glucose (NO BRAND SPECIFIED) test strip Use to test blood sugar 1 time daily or as directed.  Qty: 100 strip, Refills: 4    Associated Diagnoses: Type 2 diabetes mellitus with  hyperglycemia, with long-term current use of insulin (H)      blood glucose monitoring (NO BRAND SPECIFIED) meter device kit Use to test blood sugar 3 times daily or as directed.  Qty: 1 kit, Refills: 0    Associated Diagnoses: Type 2 diabetes mellitus with hyperglycemia, with long-term current use of insulin (H)      Cefadroxil (DURICEF) 1 g tablet Take 1 tablet (1 g) by mouth daily for 7 days.  Qty: 7 tablet, Refills: 0      !! Continuous Glucose Sensor (DEXCOM G6 SENSOR) MISC 1 each every 10 days Change every 10 days.  Qty: 3 each, Refills: 11    Associated Diagnoses: Type 2 diabetes mellitus with hyperglycemia, with long-term current use of insulin (H)      !! Continuous Glucose Sensor (DEXCOM G7 SENSOR) MISC 1 Device every 10 days.  Qty: 9 each, Refills: 3    Associated Diagnoses: Type 2 diabetes mellitus with hyperglycemia, with long-term current use of insulin (H)      Continuous Glucose Transmitter (DEXCOM G6 TRANSMITTER) MISC 1 each every 3 months Change every 3 months.  Qty: 1 each, Refills: 3    Associated Diagnoses: Type 2 diabetes mellitus with hyperglycemia, with long-term current use of insulin (H)      doxycycline hyclate (VIBRAMYCIN) 100 MG capsule Take 1 capsule (100 mg) by mouth 2 times daily for 7 days.  Qty: 14 capsule, Refills: 0      famotidine (PEPCID) 40 MG tablet Take 1 tablet (40 mg) by mouth daily  Qty: 30 tablet, Refills: 2    Associated Diagnoses: Gastroesophageal reflux disease, unspecified whether esophagitis present      glipiZIDE (GLUCOTROL XL) 10 MG 24 hr tablet Take 1 tablet (10 mg) by mouth daily  Qty: 90 tablet, Refills: 1    Associated Diagnoses: Type 2 diabetes mellitus with hyperglycemia, with long-term current use of insulin (H)      insulin glargine (LANTUS PEN) 100 UNIT/ML pen Inject 27 units subcutaneously 2 times daily to lower blood sugar. Discard pens after they've been out of the fridge for 28 days. Keep at room temperature.  Qty: 15 mL, Refills: 1    Comments: If  "Lantus is not covered by insurance, may substitute Basaglar or Semglee or other insulin glargine product per insurance preference at same dose and frequency.    Associated Diagnoses: Type 2 diabetes mellitus with hyperglycemia, with long-term current use of insulin (H)      insulin pen needle (32G X 4 MM) 32G X 4 MM miscellaneous Use 4-5 pen needles daily or as directed.  Qty: 100 each, Refills: 2    Associated Diagnoses: Type 2 diabetes mellitus with hyperglycemia, with long-term current use of insulin (H)      Nutritional Supplements (ENSURE ACTIVE) LIQD Take 414 mLs by mouth daily  Qty: 30 mL, Refills: 11    Associated Diagnoses: Type 2 diabetes mellitus with hyperglycemia, with long-term current use of insulin (H); Human immunodeficiency virus I infection (H)       !! - Potential duplicate medications found. Please discuss with provider.        Allergies   Allergies   Allergen Reactions    Fentanyl Blisters     Per pt, after taking this medication had blisters develope    Tylenol [Acetaminophen] Itching    Dulaglutide Rash    Insulin Lispro Rash     Patient reported    Penicillin V Other (See Comments) and Rash     Diffuse maculopapular rash + feels \"high\", per pt.      "

## 2024-10-11 ENCOUNTER — APPOINTMENT (OUTPATIENT)
Dept: GENERAL RADIOLOGY | Facility: CLINIC | Age: 59
End: 2024-10-11
Attending: EMERGENCY MEDICINE
Payer: COMMERCIAL

## 2024-10-11 ENCOUNTER — HOSPITAL ENCOUNTER (EMERGENCY)
Facility: CLINIC | Age: 59
Discharge: HOME | End: 2024-10-12
Attending: EMERGENCY MEDICINE
Payer: COMMERCIAL

## 2024-10-11 ENCOUNTER — HOSPITAL ENCOUNTER (EMERGENCY)
Facility: CLINIC | Age: 59
Discharge: HOME | End: 2024-10-11
Attending: EMERGENCY MEDICINE | Admitting: EMERGENCY MEDICINE
Payer: COMMERCIAL

## 2024-10-11 VITALS
HEART RATE: 73 BPM | WEIGHT: 160 LBS | OXYGEN SATURATION: 100 % | SYSTOLIC BLOOD PRESSURE: 165 MMHG | HEIGHT: 64 IN | TEMPERATURE: 98.4 F | BODY MASS INDEX: 27.31 KG/M2 | DIASTOLIC BLOOD PRESSURE: 89 MMHG | RESPIRATION RATE: 18 BRPM

## 2024-10-11 DIAGNOSIS — R10.9 ABDOMINAL PAIN, UNSPECIFIED ABDOMINAL LOCATION: ICD-10-CM

## 2024-10-11 DIAGNOSIS — R10.84 GENERALIZED ABDOMINAL PAIN: ICD-10-CM

## 2024-10-11 LAB
ALBUMIN SERPL BCG-MCNC: 3.7 G/DL (ref 3.5–5.2)
ALBUMIN SERPL BCG-MCNC: 4.1 G/DL (ref 3.5–5.2)
ALP SERPL-CCNC: 102 U/L (ref 40–150)
ALP SERPL-CCNC: 92 U/L (ref 40–150)
ALT SERPL W P-5'-P-CCNC: 11 U/L (ref 0–70)
ALT SERPL W P-5'-P-CCNC: 13 U/L (ref 0–70)
ANION GAP SERPL CALCULATED.3IONS-SCNC: 12 MMOL/L (ref 7–15)
ANION GAP SERPL CALCULATED.3IONS-SCNC: 9 MMOL/L (ref 7–15)
AST SERPL W P-5'-P-CCNC: 14 U/L (ref 0–45)
AST SERPL W P-5'-P-CCNC: ABNORMAL U/L
BACTERIA SPEC CULT: ABNORMAL
BASOPHILS # BLD AUTO: 0 10E3/UL (ref 0–0.2)
BASOPHILS # BLD AUTO: 0 10E3/UL (ref 0–0.2)
BASOPHILS NFR BLD AUTO: 0 %
BASOPHILS NFR BLD AUTO: 0 %
BILIRUB SERPL-MCNC: 0.8 MG/DL
BILIRUB SERPL-MCNC: 0.9 MG/DL
BUN SERPL-MCNC: 11.1 MG/DL (ref 8–23)
BUN SERPL-MCNC: 11.3 MG/DL (ref 8–23)
CALCIUM SERPL-MCNC: 10 MG/DL (ref 8.8–10.4)
CALCIUM SERPL-MCNC: 9.2 MG/DL (ref 8.8–10.4)
CD3 CELLS # BLD: 1482 CELLS/UL (ref 603–2990)
CD3 CELLS NFR BLD: 69 % (ref 49–84)
CD3+CD4+ CELLS # BLD: 423 CELLS/UL (ref 441–2156)
CD3+CD4+ CELLS NFR BLD: 20 % (ref 28–63)
CD3+CD4+ CELLS/CD3+CD8+ CLL BLD: 0.42 % (ref 1.4–2.6)
CD3+CD8+ CELLS # BLD: 1009 CELLS/UL (ref 125–1312)
CD3+CD8+ CELLS NFR BLD: 47 % (ref 10–40)
CHLORIDE SERPL-SCNC: 102 MMOL/L (ref 98–107)
CHLORIDE SERPL-SCNC: 99 MMOL/L (ref 98–107)
CREAT SERPL-MCNC: 0.76 MG/DL (ref 0.67–1.17)
CREAT SERPL-MCNC: 0.86 MG/DL (ref 0.67–1.17)
EGFRCR SERPLBLD CKD-EPI 2021: >90 ML/MIN/1.73M2
EGFRCR SERPLBLD CKD-EPI 2021: >90 ML/MIN/1.73M2
EOSINOPHIL # BLD AUTO: 0.1 10E3/UL (ref 0–0.7)
EOSINOPHIL # BLD AUTO: 0.1 10E3/UL (ref 0–0.7)
EOSINOPHIL NFR BLD AUTO: 1 %
EOSINOPHIL NFR BLD AUTO: 1 %
ERYTHROCYTE [DISTWIDTH] IN BLOOD BY AUTOMATED COUNT: 12.6 % (ref 10–15)
ERYTHROCYTE [DISTWIDTH] IN BLOOD BY AUTOMATED COUNT: 12.9 % (ref 10–15)
GLUCOSE SERPL-MCNC: 147 MG/DL (ref 70–99)
GLUCOSE SERPL-MCNC: 190 MG/DL (ref 70–99)
HCO3 SERPL-SCNC: 25 MMOL/L (ref 22–29)
HCO3 SERPL-SCNC: 26 MMOL/L (ref 22–29)
HCT VFR BLD AUTO: 39.5 % (ref 40–53)
HCT VFR BLD AUTO: 39.6 % (ref 40–53)
HGB BLD-MCNC: 12.9 G/DL (ref 13.3–17.7)
HGB BLD-MCNC: 13.6 G/DL (ref 13.3–17.7)
IMM GRANULOCYTES # BLD: 0 10E3/UL
IMM GRANULOCYTES # BLD: 0 10E3/UL
IMM GRANULOCYTES NFR BLD: 0 %
IMM GRANULOCYTES NFR BLD: 0 %
LACTATE SERPL-SCNC: 2.1 MMOL/L (ref 0.7–2)
LIPASE SERPL-CCNC: 27 U/L (ref 13–60)
LYMPHOCYTES # BLD AUTO: 1.7 10E3/UL (ref 0.8–5.3)
LYMPHOCYTES # BLD AUTO: 2.1 10E3/UL (ref 0.8–5.3)
LYMPHOCYTES NFR BLD AUTO: 21 %
LYMPHOCYTES NFR BLD AUTO: 27 %
MAGNESIUM SERPL-MCNC: 1.6 MG/DL (ref 1.7–2.3)
MCH RBC QN AUTO: 29.5 PG (ref 26.5–33)
MCH RBC QN AUTO: 29.6 PG (ref 26.5–33)
MCHC RBC AUTO-ENTMCNC: 32.7 G/DL (ref 31.5–36.5)
MCHC RBC AUTO-ENTMCNC: 34.3 G/DL (ref 31.5–36.5)
MCV RBC AUTO: 86 FL (ref 78–100)
MCV RBC AUTO: 90 FL (ref 78–100)
MONOCYTES # BLD AUTO: 0.4 10E3/UL (ref 0–1.3)
MONOCYTES # BLD AUTO: 0.5 10E3/UL (ref 0–1.3)
MONOCYTES NFR BLD AUTO: 5 %
MONOCYTES NFR BLD AUTO: 6 %
NEUTROPHILS # BLD AUTO: 5.3 10E3/UL (ref 1.6–8.3)
NEUTROPHILS # BLD AUTO: 5.7 10E3/UL (ref 1.6–8.3)
NEUTROPHILS NFR BLD AUTO: 67 %
NEUTROPHILS NFR BLD AUTO: 71 %
NRBC # BLD AUTO: 0 10E3/UL
NRBC # BLD AUTO: 0 10E3/UL
NRBC BLD AUTO-RTO: 0 /100
NRBC BLD AUTO-RTO: 0 /100
PLATELET # BLD AUTO: 355 10E3/UL (ref 150–450)
PLATELET # BLD AUTO: 355 10E3/UL (ref 150–450)
POTASSIUM SERPL-SCNC: 3.5 MMOL/L (ref 3.4–5.3)
POTASSIUM SERPL-SCNC: 4.4 MMOL/L (ref 3.4–5.3)
PROT SERPL-MCNC: 7.1 G/DL (ref 6.4–8.3)
PROT SERPL-MCNC: 7.6 G/DL (ref 6.4–8.3)
RBC # BLD AUTO: 4.38 10E6/UL (ref 4.4–5.9)
RBC # BLD AUTO: 4.59 10E6/UL (ref 4.4–5.9)
SODIUM SERPL-SCNC: 136 MMOL/L (ref 135–145)
SODIUM SERPL-SCNC: 137 MMOL/L (ref 135–145)
T CELL COMMENT: ABNORMAL
WBC # BLD AUTO: 7.9 10E3/UL (ref 4–11)
WBC # BLD AUTO: 8 10E3/UL (ref 4–11)

## 2024-10-11 PROCEDURE — 74019 RADEX ABDOMEN 2 VIEWS: CPT

## 2024-10-11 PROCEDURE — 74019 RADEX ABDOMEN 2 VIEWS: CPT | Mod: 26 | Performed by: RADIOLOGY

## 2024-10-11 PROCEDURE — 83735 ASSAY OF MAGNESIUM: CPT | Performed by: EMERGENCY MEDICINE

## 2024-10-11 PROCEDURE — 84460 ALANINE AMINO (ALT) (SGPT): CPT | Performed by: EMERGENCY MEDICINE

## 2024-10-11 PROCEDURE — 93005 ELECTROCARDIOGRAM TRACING: CPT | Performed by: EMERGENCY MEDICINE

## 2024-10-11 PROCEDURE — 96365 THER/PROPH/DIAG IV INF INIT: CPT | Mod: 59 | Performed by: EMERGENCY MEDICINE

## 2024-10-11 PROCEDURE — 36415 COLL VENOUS BLD VENIPUNCTURE: CPT | Performed by: EMERGENCY MEDICINE

## 2024-10-11 PROCEDURE — 250N000011 HC RX IP 250 OP 636: Performed by: EMERGENCY MEDICINE

## 2024-10-11 PROCEDURE — 96374 THER/PROPH/DIAG INJ IV PUSH: CPT | Performed by: EMERGENCY MEDICINE

## 2024-10-11 PROCEDURE — 250N000013 HC RX MED GY IP 250 OP 250 PS 637: Performed by: EMERGENCY MEDICINE

## 2024-10-11 PROCEDURE — 96375 TX/PRO/DX INJ NEW DRUG ADDON: CPT | Performed by: EMERGENCY MEDICINE

## 2024-10-11 PROCEDURE — 83690 ASSAY OF LIPASE: CPT | Performed by: EMERGENCY MEDICINE

## 2024-10-11 PROCEDURE — 83605 ASSAY OF LACTIC ACID: CPT | Performed by: EMERGENCY MEDICINE

## 2024-10-11 PROCEDURE — 85025 COMPLETE CBC W/AUTO DIFF WBC: CPT | Performed by: EMERGENCY MEDICINE

## 2024-10-11 PROCEDURE — 99284 EMERGENCY DEPT VISIT MOD MDM: CPT | Mod: 25 | Performed by: EMERGENCY MEDICINE

## 2024-10-11 PROCEDURE — 93010 ELECTROCARDIOGRAM REPORT: CPT | Performed by: EMERGENCY MEDICINE

## 2024-10-11 PROCEDURE — 99284 EMERGENCY DEPT VISIT MOD MDM: CPT | Performed by: EMERGENCY MEDICINE

## 2024-10-11 RX ORDER — MAGNESIUM HYDROXIDE/ALUMINUM HYDROXICE/SIMETHICONE 120; 1200; 1200 MG/30ML; MG/30ML; MG/30ML
30 SUSPENSION ORAL ONCE
Status: COMPLETED | OUTPATIENT
Start: 2024-10-11 | End: 2024-10-11

## 2024-10-11 RX ORDER — KETOROLAC TROMETHAMINE 15 MG/ML
15 INJECTION, SOLUTION INTRAMUSCULAR; INTRAVENOUS ONCE
Status: COMPLETED | OUTPATIENT
Start: 2024-10-11 | End: 2024-10-11

## 2024-10-11 RX ORDER — MAGNESIUM SULFATE HEPTAHYDRATE 40 MG/ML
2 INJECTION, SOLUTION INTRAVENOUS ONCE
Status: COMPLETED | OUTPATIENT
Start: 2024-10-11 | End: 2024-10-12

## 2024-10-11 RX ORDER — LORAZEPAM 2 MG/ML
1 INJECTION INTRAMUSCULAR ONCE
Status: COMPLETED | OUTPATIENT
Start: 2024-10-11 | End: 2024-10-11

## 2024-10-11 RX ORDER — ONDANSETRON 2 MG/ML
4 INJECTION INTRAMUSCULAR; INTRAVENOUS EVERY 30 MIN PRN
Status: DISCONTINUED | OUTPATIENT
Start: 2024-10-11 | End: 2024-10-11 | Stop reason: HOSPADM

## 2024-10-11 RX ADMIN — ONDANSETRON 4 MG: 2 INJECTION INTRAMUSCULAR; INTRAVENOUS at 07:31

## 2024-10-11 RX ADMIN — LORAZEPAM 1 MG: 2 INJECTION INTRAMUSCULAR; INTRAVENOUS at 22:17

## 2024-10-11 RX ADMIN — KETOROLAC TROMETHAMINE 15 MG: 15 INJECTION, SOLUTION INTRAMUSCULAR; INTRAVENOUS at 07:31

## 2024-10-11 RX ADMIN — FAMOTIDINE 20 MG: 10 INJECTION, SOLUTION INTRAVENOUS at 22:17

## 2024-10-11 RX ADMIN — MAGNESIUM SULFATE HEPTAHYDRATE 2 G: 40 INJECTION, SOLUTION INTRAVENOUS at 23:47

## 2024-10-11 ASSESSMENT — ACTIVITIES OF DAILY LIVING (ADL)
ADLS_ACUITY_SCORE: 37

## 2024-10-11 NOTE — DISCHARGE INSTRUCTIONS
Please call today to schedule a follow-up appointment with your primary medical doctor in 3-5 days for reevaluation, which is important after today's emergency department visit.   Please return to emergency department for fever>104F, persistent vomiting, worsening chest pain, shortness of breath

## 2024-10-11 NOTE — ED PROVIDER NOTES
Patient was signed out to me by prior attending  Briefly 58-year-old male with history of multiple small bowel obstructions who was recently seen for abdominal pain and diagnosed with same.    Patient apparently had left AGAINST MEDICAL ADVICE he had refused an NG tube.  He however was reportedly eating and improving at that time.  His abdominal pain worsened he had come back to the emergency department when he was seen by my colleague.  At that time no repeat CT was ordered but chest x-ray of the abdomen was ordered to follow any changes in obstructive bowel gas pattern while monitoring the patient clinically.    I saw the patient in the waiting room where he reported to me that he was feeling much better and was requesting to eat.  Given this somewhat consistent in his pattern and demonstrate spontaneous improvement in the setting of his improved chest x-ray he was offered food.  Spoke with the patient instructed him to eat clears initially and if doing well we would help him advance.  The patient was witnessed eating a variety of food and ultimately eloped from the emergency department.     Chevy Bernal MD  10/11/24 [id number]

## 2024-10-11 NOTE — ED TRIAGE NOTES
"BP (!) 165/89   Pulse 73   Temp 98.4  F (36.9  C) (Oral)   Resp 18   Ht 1.626 m (5' 4\")   Wt 72.6 kg (160 lb)   SpO2 100%   BMI 27.46 kg/m       Patient ambulatory to triage complaining of abdominal pain and nausea without vomiting. He says he feels his stomach is distended. Last bowel movement he reports three days ago.         "

## 2024-10-11 NOTE — ED PROVIDER NOTES
ED Provider Note  [facility]      History     Chief Complaint   Patient presents with    Abdominal Pain    Nausea, Vomiting, & Diarrhea     Patient ambulatory to triage complaining of abdominal pain and nausea without vomiting. He says he feels his stomach is distended. Last bowel movement he reports three days ago.      HPI  Andrew Collier is a 58 year old male who presents to the ED for evaluation of abdominal pain.  Was recently mid to the hospital after a CT scan showed a small bowel obstruction.  While inpatient, he had refused NG tube and was ultimately discharged AGAINST MEDICAL ADVICE.  He was seen in the ED again yesterday and a CT scan showed stable appearance of small bowel obstruction.  He was reportedly eating and drinking without issue and so was discharged with return precautions.  Today, he states that he has diffuse abdominal distention, decreased bowel movements and nausea and vomiting.  He feels that his bowel obstruction has returned and is amenable to admission to the hospital.      Past Medical History  Past Medical History:   Diagnosis Date    Acute appendicitis with localized peritonitis 1/31/2018    AIDS (H)     Allergic rhinitis due to other allergen     DNS    Anal dysplasia     Chronic abdominal pain     CNS toxoplasmosis (H)     COVID-19 1/11/2022    Diabetes type 2, controlled (H)     GERD (gastroesophageal reflux disease)     Herpes zoster 9/23/2016    History of seizure     History of substance abuse (H)     HIV (human immunodeficiency virus infection) (H)     HLD (hyperlipidemia)     Lung nodules     Periungual wart     Pneumonia 1/6/2019    PTSD (post-traumatic stress disorder)     Sleep apnea     doesn't use CPAP     Past Surgical History:   Procedure Laterality Date    ANOSCOPY N/A 9/9/2020    Procedure: Exam Under Anesthesia, ANOSCOPY, fulgeration of rectal fissures with Rectal Biopsies;  Surgeon: Thanh Lundberg MD;  Location:  OR     COLONOSCOPY Left 1/22/2016    Procedure: COMBINED COLONOSCOPY, SINGLE OR MULTIPLE BIOPSY/POLYPECTOMY BY BIOPSY;  Surgeon: Clark Saini MD;  Location: [facility]    ESOPHAGOSCOPY, GASTROSCOPY, DUODENOSCOPY (EGD), COMBINED N/A 6/6/2022    Procedure: ESOPHAGOGASTRODUODENOSCOPY, WITH BIOPSY;  Surgeon: Kecia Benítez MD;  Location: [facility]    ESOPHAGOSCOPY, GASTROSCOPY, DUODENOSCOPY (EGD), COMBINED N/A 10/10/2024    Procedure: Esophagoscopy, gastroscopy, duodenoscopy (EGD), combined;  Surgeon: Nico Bhatti DO;  Location: [facility]    HC EXPLORE UNDESC TESTIS,INGUIN/SCROTAL      LAPAROSCOPIC APPENDECTOMY N/A 1/31/2018    Procedure: LAPAROSCOPIC APPENDECTOMY;  LAPAROSCOPIC APPENDECTOMY;  Surgeon: Dawn Holt MD;  Location: [facility]    LAPAROSCOPY DIAGNOSTIC (GENERAL) N/A 7/26/2016    Procedure: LAPAROSCOPY DIAGNOSTIC (GENERAL);  Surgeon: Susannah Arriaga MD;  Location: [facility]    LAPAROSCOPY DIAGNOSTIC (GENERAL) N/A 4/16/2018    Procedure: LAPAROSCOPY DIAGNOSTIC (GENERAL);  Diagnostic laparoscopy and lysis of adhesions;  Surgeon: Prince Dowling MD;  Location: [facility]    OPTICAL TRACKING SYSTEM CRANIOTOMY, EXCISE TUMOR, COMBINED Left 4/10/2015    Procedure: COMBINED OPTICAL TRACKING SYSTEM CRANIOTOMY, EXCISE TUMOR;  Surgeon: Mirlande Colmenares MD;  Location: [facility]    REPAIR GAMEKEEPER'S THUMB Right 12/2/2016    Procedure: REPAIR LIGAMENT ULNAR COLLATERAL THUMB (GAMEKEEPER'S);  Surgeon: Evin Zamorano MD;  Location: [facility]    ZZC NONSPECIFIC PROCEDURE      right forearm fracture     bictegravir-emtricitabine-tenofovir (BIKTARVY) -25 MG per tablet  blood glucose (NO BRAND SPECIFIED) lancets standard  blood glucose (NO BRAND SPECIFIED) test strip  blood glucose monitoring (NO BRAND SPECIFIED) meter device kit  Cefadroxil (DURICEF) 1 g tablet  Continuous Glucose Sensor (DEXCOM G6 SENSOR) MISC  Continuous Glucose Sensor (DEXCOM G7 SENSOR) MISC  Continuous Glucose Transmitter (DEXCOM G6  "TRANSMITTER) MISC  doxycycline hyclate (VIBRAMYCIN) 100 MG capsule  famotidine (PEPCID) 40 MG tablet  glipiZIDE (GLUCOTROL XL) 10 MG 24 hr tablet  insulin glargine (LANTUS PEN) 100 UNIT/ML pen  insulin pen needle (32G X 4 MM) 32G X 4 MM miscellaneous  Nutritional Supplements (ENSURE ACTIVE) LIQD      Allergies   Allergen Reactions    Fentanyl Blisters     Per pt, after taking this medication had blisters develope    Tylenol [Acetaminophen] Itching    Dulaglutide Rash    Insulin Lispro Rash     Patient reported    Penicillin V Other (See Comments) and Rash     Diffuse maculopapular rash + feels \"high\", per pt.      Family History  Family History   Problem Relation Age of Onset    Diabetes Brother     Diabetes Father     Alzheimer Disease Father     Unknown/Adopted Mother     Diabetes Paternal Grandfather     Cancer No family hx of         no skin cancer    Skin Cancer No family hx of         no famiy hx of skin cancer    Glaucoma No family hx of     Macular Degeneration No family hx of      Social History   Social History     Tobacco Use    Smoking status: Some Days     Current packs/day: 0.25     Average packs/day: 0.3 packs/day for 40.0 years (10.0 ttl pk-yrs)     Types: Cigarettes    Smokeless tobacco: Former   Substance Use Topics    Alcohol use: No     Alcohol/week: 0.0 standard drinks of alcohol     Comment: Last etoh in 2007    Drug use: Not Currently     Types: Marijuana, Methamphetamines      A medically appropriate review of systems was performed with pertinent positives and negatives noted in the HPI, and all other systems negative.    Physical Exam   BP: (!) 165/89  Pulse: 73  Temp: 98.4  F (36.9  C)  Resp: 18  Height: 162.6 cm (5' 4\")  Weight: 72.6 kg (160 lb)  SpO2: 100 %  Physical Exam  Vitals and nursing note reviewed.   Constitutional:       General: He is not in acute distress.     Appearance: Normal appearance. He is ill-appearing.   HENT:      Head: Normocephalic.      Nose: Nose normal.   Eyes: "      Pupils: Pupils are equal, round, and reactive to light.   Cardiovascular:      Rate and Rhythm: Normal rate and regular rhythm.   Pulmonary:      Effort: Pulmonary effort is normal.   Abdominal:      General: There is distension.      Tenderness: There is abdominal tenderness. There is no guarding.   Musculoskeletal:         General: No deformity. Normal range of motion.      Cervical back: Normal range of motion.   Skin:     General: Skin is warm.   Neurological:      Mental Status: He is alert and oriented to person, place, and time.   Psychiatric:         Mood and Affect: Mood normal.         ED Course, Procedures, & Data     ED Course as of 10/12/24 0043   Fri Oct 11, 2024   0625      EKG Interpretation:     Interpreted by Kofi Mauro DO  Time reviewed:0625   Symptoms at time of EKG: syncope   Rhythm: normal sinus   Rate: normal  Axis: NORMAL  Ectopy: none  Conduction: normal  ST Segments/ T Waves: No ST-T wave changes  Q Waves: none  Comparison to prior: No old EKG available    Clinical Impression: normal EKG           Results for orders placed or performed during the hospital encounter of 10/11/24   Comprehensive metabolic panel     Status: Abnormal   Result Value Ref Range    Sodium 137 135 - 145 mmol/L    Potassium 3.5 3.4 - 5.3 mmol/L    Carbon Dioxide (CO2) 26 22 - 29 mmol/L    Anion Gap 12 7 - 15 mmol/L    Urea Nitrogen 11.3 8.0 - 23.0 mg/dL    Creatinine 0.76 0.67 - 1.17 mg/dL    GFR Estimate >90 >60 mL/min/1.73m2    Calcium 10.0 8.8 - 10.4 mg/dL    Chloride 99 98 - 107 mmol/L    Glucose 190 (H) 70 - 99 mg/dL    Alkaline Phosphatase 102 40 - 150 U/L    AST 14 0 - 45 U/L    ALT 13 0 - 70 U/L    Protein Total 7.6 6.4 - 8.3 g/dL    Albumin 4.1 3.5 - 5.2 g/dL    Bilirubin Total 0.9 <=1.2 mg/dL   Magnesium     Status: Abnormal   Result Value Ref Range    Magnesium 1.6 (L) 1.7 - 2.3 mg/dL   Lipase     Status: Normal   Result Value Ref Range    Lipase 27 13 - 60 U/L   Lactic acid whole blood with 1x  repeat in 2 hr when >2     Status: Abnormal   Result Value Ref Range    Lactic Acid, Initial 2.1 (H) 0.7 - 2.0 mmol/L   CBC with platelets and differential     Status: Abnormal   Result Value Ref Range    WBC Count 7.9 4.0 - 11.0 10e3/uL    RBC Count 4.59 4.40 - 5.90 10e6/uL    Hemoglobin 13.6 13.3 - 17.7 g/dL    Hematocrit 39.6 (L) 40.0 - 53.0 %    MCV 86 78 - 100 fL    MCH 29.6 26.5 - 33.0 pg    MCHC 34.3 31.5 - 36.5 g/dL    RDW 12.6 10.0 - 15.0 %    Platelet Count 355 150 - 450 10e3/uL    % Neutrophils 67 %    % Lymphocytes 27 %    % Monocytes 5 %    % Eosinophils 1 %    % Basophils 0 %    % Immature Granulocytes 0 %    NRBCs per 100 WBC 0 <1 /100    Absolute Neutrophils 5.3 1.6 - 8.3 10e3/uL    Absolute Lymphocytes 2.1 0.8 - 5.3 10e3/uL    Absolute Monocytes 0.4 0.0 - 1.3 10e3/uL    Absolute Eosinophils 0.1 0.0 - 0.7 10e3/uL    Absolute Basophils 0.0 0.0 - 0.2 10e3/uL    Absolute Immature Granulocytes 0.0 <=0.4 10e3/uL    Absolute NRBCs 0.0 10e3/uL   CBC with platelets differential     Status: Abnormal    Narrative    The following orders were created for panel order CBC with platelets differential.  Procedure                               Abnormality         Status                     ---------                               -----------         ------                     CBC with platelets and d...[[id number]]  Abnormal            Final result                 Please view results for these tests on the individual orders.   Results for orders placed or performed during the hospital encounter of 10/11/24   Abdomen XR, 2 vw, flat and upright     Status: None    Narrative    Exam: XR ABDOMEN 2 VIEWS, 10/11/2024 7:50 AM    Indication: recent sbo    Comparison: CT abdomen and pelvis with contrast 10/10/2024 and  10/9/2024. Abdominal radiograph 10/10/2024.    Findings:   Upright and supine abdominal images. No free air on upright imaging.  Excreted contrast material in the bladder from CT studies. Partially  visualized  penile prosthesis. Air-fluid levels within gas-filled small  bowel loops centrally noted on upright radiograph, measuring up to 4.4  cm. Additional fluid filled small bowel loops on CT are not well  assessed. Gaseous distention has improved from prior radiograph  10/10/2024 where loops measured over 5 cm. There is gas and stool  throughout the colon. The gas and debris-filled distended stomach on  CT appears to have improved allowing for differences between  modalities. No acute osseous abnormalities. Basal atelectasis.      Impression    Impression: Ongoing distention of central small bowel loops with  air-fluid levels on upright imaging and gas and stool throughout the  colon consistent with persistent partial small bowel obstruction as  discussed on CT. Distention has improved compared to radiograph  10/10/2024.    APRIL ELIZONDO MD         SYSTEM ID:  [id number]   Comprehensive metabolic panel     Status: Abnormal   Result Value Ref Range    Sodium 136 135 - 145 mmol/L    Potassium 4.4 3.4 - 5.3 mmol/L    Carbon Dioxide (CO2) 25 22 - 29 mmol/L    Anion Gap 9 7 - 15 mmol/L    Urea Nitrogen 11.1 8.0 - 23.0 mg/dL    Creatinine 0.86 0.67 - 1.17 mg/dL    GFR Estimate >90 >60 mL/min/1.73m2    Calcium 9.2 8.8 - 10.4 mg/dL    Chloride 102 98 - 107 mmol/L    Glucose 147 (H) 70 - 99 mg/dL    Alkaline Phosphatase 92 40 - 150 U/L    AST      ALT 11 0 - 70 U/L    Protein Total 7.1 6.4 - 8.3 g/dL    Albumin 3.7 3.5 - 5.2 g/dL    Bilirubin Total 0.8 <=1.2 mg/dL   CBC with platelets and differential     Status: Abnormal   Result Value Ref Range    WBC Count 8.0 4.0 - 11.0 10e3/uL    RBC Count 4.38 (L) 4.40 - 5.90 10e6/uL    Hemoglobin 12.9 (L) 13.3 - 17.7 g/dL    Hematocrit 39.5 (L) 40.0 - 53.0 %    MCV 90 78 - 100 fL    MCH 29.5 26.5 - 33.0 pg    MCHC 32.7 31.5 - 36.5 g/dL    RDW 12.9 10.0 - 15.0 %    Platelet Count 355 150 - 450 10e3/uL    % Neutrophils 71 %    % Lymphocytes 21 %    % Monocytes 6 %    % Eosinophils 1 %    %  Basophils 0 %    % Immature Granulocytes 0 %    NRBCs per 100 WBC 0 <1 /100    Absolute Neutrophils 5.7 1.6 - 8.3 10e3/uL    Absolute Lymphocytes 1.7 0.8 - 5.3 10e3/uL    Absolute Monocytes 0.5 0.0 - 1.3 10e3/uL    Absolute Eosinophils 0.1 0.0 - 0.7 10e3/uL    Absolute Basophils 0.0 0.0 - 0.2 10e3/uL    Absolute Immature Granulocytes 0.0 <=0.4 10e3/uL    Absolute NRBCs 0.0 10e3/uL   CBC with platelets differential     Status: Abnormal    Narrative    The following orders were created for panel order CBC with platelets differential.  Procedure                               Abnormality         Status                     ---------                               -----------         ------                     CBC with platelets and d...[[id number]]  Abnormal            Final result                 Please view results for these tests on the individual orders.     Medications   ketorolac (TORADOL) injection 15 mg (15 mg Intravenous $Given 10/11/24 0731)     Labs Ordered and Resulted from Time of ED Arrival to Time of ED Departure   COMPREHENSIVE METABOLIC PANEL - Abnormal       Result Value    Sodium 136      Potassium 4.4      Carbon Dioxide (CO2) 25      Anion Gap 9      Urea Nitrogen 11.1      Creatinine 0.86      GFR Estimate >90      Calcium 9.2      Chloride 102      Glucose 147 (*)     Alkaline Phosphatase 92      AST        ALT 11      Protein Total 7.1      Albumin 3.7      Bilirubin Total 0.8     CBC WITH PLATELETS AND DIFFERENTIAL - Abnormal    WBC Count 8.0      RBC Count 4.38 (*)     Hemoglobin 12.9 (*)     Hematocrit 39.5 (*)     MCV 90      MCH 29.5      MCHC 32.7      RDW 12.9      Platelet Count 355      % Neutrophils 71      % Lymphocytes 21      % Monocytes 6      % Eosinophils 1      % Basophils 0      % Immature Granulocytes 0      NRBCs per 100 WBC 0      Absolute Neutrophils 5.7      Absolute Lymphocytes 1.7      Absolute Monocytes 0.5      Absolute Eosinophils 0.1      Absolute Basophils 0.0       Absolute Immature Granulocytes 0.0      Absolute NRBCs 0.0       Abdomen XR, 2 vw, flat and upright   Final Result   Impression: Ongoing distention of central small bowel loops with   air-fluid levels on upright imaging and gas and stool throughout the   colon consistent with persistent partial small bowel obstruction as   discussed on CT. Distention has improved compared to radiograph   10/10/2024.      APRIL ELIZONDO MD            SYSTEM ID:  [id number]             Critical care was not performed.     Medical Decision Making  The patient's presentation was of moderate complexity (a chronic illness mild to moderate exacerbation, progression, or side effect of treatment).    The patient's evaluation involved:  review of 3+ test result(s) ordered prior to this encounter (CT abdomen pelvis from 10/9/2024, CT abdomen pelvis from 10/10/2024, x-ray abdomen from 10/10/2024)  strong consideration of a test (considered repeat CT abdomen) that was ultimately deferred  ordering and/or review of 3+ test(s) in this encounter (see separate area of note for details)    The patient's management necessitated moderate risk (prescription drug management including medications given in the ED) and further care after sign-out to Dr. Bernal (see their note for further management).    Assessment & Plan    Patient presents the ED for evaluation of abdominal pain nausea and vomiting.  Recently admitted for small bowel obstruction.  On arrival, patient is a slightly distended abdomen is diffusely tender.  Clinical suspicion for repeat abdominal obstruction.  Will obtain x abdominal x-ray basic labs.  Will sign out to morning provider with plan to follow-up on workup and disposition accordingly.  Anticipate admission to the hospital unless symptoms significantly improved in the ED.    I have reviewed the nursing notes. I have reviewed the findings, diagnosis, plan and need for follow up with the patient.    Discharge Medication List as of  10/11/2024  2:56 PM          Final diagnoses:   Generalized abdominal pain       Kofi Mauro DO  [facility] EMERGENCY DEPARTMENT  10/11/2024     Kofi Mauro DO  10/12/24 0043

## 2024-10-12 VITALS
SYSTOLIC BLOOD PRESSURE: 116 MMHG | TEMPERATURE: 97.3 F | BODY MASS INDEX: 28.17 KG/M2 | OXYGEN SATURATION: 99 % | HEART RATE: 83 BPM | RESPIRATION RATE: 16 BRPM | HEIGHT: 64 IN | WEIGHT: 165 LBS | DIASTOLIC BLOOD PRESSURE: 73 MMHG

## 2024-10-12 LAB
ATRIAL RATE - MUSE: 82 BPM
DIASTOLIC BLOOD PRESSURE - MUSE: NORMAL MMHG
INTERPRETATION ECG - MUSE: NORMAL
P AXIS - MUSE: 50 DEGREES
PR INTERVAL - MUSE: 128 MS
QRS DURATION - MUSE: 92 MS
QT - MUSE: 380 MS
QTC - MUSE: 443 MS
R AXIS - MUSE: -17 DEGREES
SYSTOLIC BLOOD PRESSURE - MUSE: NORMAL MMHG
T AXIS - MUSE: 11 DEGREES
VENTRICULAR RATE- MUSE: 82 BPM

## 2024-10-12 ASSESSMENT — ACTIVITIES OF DAILY LIVING (ADL): ADLS_ACUITY_SCORE: 37

## 2024-10-12 NOTE — ED TRIAGE NOTES
BIB EMS. Pt presents with Left upper quadrant abd pain. Pt states he was at [facility] earlier today with similar symptoms of abd pain, nausea, and vomiting but was discharged without meds and symptoms continued when he got home. Pt denies EtOH and drugs use.       Triage Assessment (Adult)       Row Name 10/11/24 2056          Triage Assessment    Airway WDL WDL        Respiratory WDL    Respiratory WDL WDL        Skin Circulation/Temperature WDL    Skin Circulation/Temperature WDL WDL        Peripheral/Neurovascular WDL    Peripheral Neurovascular WDL WDL        Cognitive/Neuro/Behavioral WDL    Cognitive/Neuro/Behavioral WDL mood/behavior     Mood/Behavior anxious

## 2024-10-12 NOTE — ED PROVIDER NOTES
[facility] EMERGENCY DEPARTMENT ([facility])    10/11/24      ED PROVIDER NOTE     History     Chief Complaint   Patient presents with    Abdominal Pain    Vomiting     BIB EMS. Pt presents with Left upper quadrant abd pain. Pt states he was at [place] earlier today with similar symptoms of abd pain, nausea, and vomiting but was discharged without meds and symptoms continued when he got home. Pt denies EtOH and drugs use.       HPI  Andrew Collier is a 58 year old male with history of multiple small bowel obstructions and a recent diagnosis of the same who presents to the ED seeking evaluation of vomiting and abdominal pain.  Patient states that he has abdominal pain that radiates to his back.  He was seen for this earlier today, at which point he left AMA.  He states that after this, he was eating and his symptoms reemerged.  He has been vomiting since.  He denies diarrhea, fever or any other differences in his symptoms than when he was first seen earlier today.    Past Medical History  Past Medical History:   Diagnosis Date    Acute appendicitis with localized peritonitis 1/31/2018    AIDS (H)     Allergic rhinitis due to other allergen     DNS    Anal dysplasia     Chronic abdominal pain     CNS toxoplasmosis (H)     COVID-19 1/11/2022    Diabetes type 2, controlled (H)     GERD (gastroesophageal reflux disease)     Herpes zoster 9/23/2016    History of seizure     History of substance abuse (H)     HIV (human immunodeficiency virus infection) (H)     HLD (hyperlipidemia)     Lung nodules     Periungual wart     Pneumonia 1/6/2019    PTSD (post-traumatic stress disorder)     Sleep apnea     doesn't use CPAP     Past Surgical History:   Procedure Laterality Date    ANOSCOPY N/A 9/9/2020    Procedure: Exam Under Anesthesia, ANOSCOPY, fulgeration of rectal fissures with Rectal Biopsies;  Surgeon: Thanh Lundberg MD;  Location: [facility] OR    COLONOSCOPY Left 1/22/2016    Procedure: COMBINED  COLONOSCOPY, SINGLE OR MULTIPLE BIOPSY/POLYPECTOMY BY BIOPSY;  Surgeon: Clark Saini MD;  Location: [facility] GI    ESOPHAGOSCOPY, GASTROSCOPY, DUODENOSCOPY (EGD), COMBINED N/A 6/6/2022    Procedure: ESOPHAGOGASTRODUODENOSCOPY, WITH BIOPSY;  Surgeon: Kecia Benítez MD;  Location: [facility] GI    ESOPHAGOSCOPY, GASTROSCOPY, DUODENOSCOPY (EGD), COMBINED N/A 10/10/2024    Procedure: Esophagoscopy, gastroscopy, duodenoscopy (EGD), combined;  Surgeon: Nico Bhatti DO;  Location: [facility] GI    HC EXPLORE UNDESC TESTIS,INGUIN/SCROTAL      LAPAROSCOPIC APPENDECTOMY N/A 1/31/2018    Procedure: LAPAROSCOPIC APPENDECTOMY;  LAPAROSCOPIC APPENDECTOMY;  Surgeon: Dawn Holt MD;  Location: [facility]    LAPAROSCOPY DIAGNOSTIC (GENERAL) N/A 7/26/2016    Procedure: LAPAROSCOPY DIAGNOSTIC (GENERAL);  Surgeon: Susannah Arriaga MD;  Location: [facility] OR    LAPAROSCOPY DIAGNOSTIC (GENERAL) N/A 4/16/2018    Procedure: LAPAROSCOPY DIAGNOSTIC (GENERAL);  Diagnostic laparoscopy and lysis of adhesions;  Surgeon: Prince Dowling MD;  Location: [facility] OR    OPTICAL TRACKING SYSTEM CRANIOTOMY, EXCISE TUMOR, COMBINED Left 4/10/2015    Procedure: COMBINED OPTICAL TRACKING SYSTEM CRANIOTOMY, EXCISE TUMOR;  Surgeon: Mirlande Colmenares MD;  Location: [facility] OR    REPAIR GAMEKEEPER'S THUMB Right 12/2/2016    Procedure: REPAIR LIGAMENT ULNAR COLLATERAL THUMB (GAMEKEEPER'S);  Surgeon: Evin Zamorano MD;  Location: [facility] OR    ZZC NONSPECIFIC PROCEDURE      right forearm fracture     bictegravir-emtricitabine-tenofovir (BIKTARVY) -25 MG per tablet  blood glucose (NO BRAND SPECIFIED) lancets standard  blood glucose (NO BRAND SPECIFIED) test strip  blood glucose monitoring (NO BRAND SPECIFIED) meter device kit  Cefadroxil (DURICEF) 1 g tablet  Continuous Glucose Sensor (DEXCOM G6 SENSOR) MISC  Continuous Glucose Sensor (DEXCOM G7 SENSOR) MISC  Continuous Glucose Transmitter (DEXCOM G6 TRANSMITTER) MISC  doxycycline hyclate (VIBRAMYCIN) 100  "MG capsule  famotidine (PEPCID) 40 MG tablet  glipiZIDE (GLUCOTROL XL) 10 MG 24 hr tablet  insulin glargine (LANTUS PEN) 100 UNIT/ML pen  insulin pen needle (32G X 4 MM) 32G X 4 MM miscellaneous  Nutritional Supplements (ENSURE ACTIVE) LIQD      Allergies   Allergen Reactions    Fentanyl Blisters     Per pt, after taking this medication had blisters develope    Tylenol [Acetaminophen] Itching    Dulaglutide Rash    Insulin Lispro Rash     Patient reported    Penicillin V Other (See Comments) and Rash     Diffuse maculopapular rash + feels \"high\", per pt.      Family History  Family History   Problem Relation Age of Onset    Diabetes Brother     Diabetes Father     Alzheimer Disease Father     Unknown/Adopted Mother     Diabetes Paternal Grandfather     Cancer No family hx of         no skin cancer    Skin Cancer No family hx of         no famiy hx of skin cancer    Glaucoma No family hx of     Macular Degeneration No family hx of      Social History   Social History     Tobacco Use    Smoking status: Some Days     Current packs/day: 0.25     Average packs/day: 0.3 packs/day for 40.0 years (10.0 ttl pk-yrs)     Types: Cigarettes    Smokeless tobacco: Former   Substance Use Topics    Alcohol use: No     Alcohol/week: 0.0 standard drinks of alcohol     Comment: Last etoh in 2007    Drug use: Not Currently     Types: Marijuana, Methamphetamines      A complete review of systems was performed with pertinent positives and negatives noted in the HPI, and all other systems negative.    Physical Exam   BP: (!) 166/89  Pulse: 68  Temp: 98.4  F (36.9  C)  Resp: 16  Height: 162.6 cm (5' 4\")  Weight: 74.8 kg (165 lb) (Pt stated)  SpO2: 99 %  Physical Exam  Vitals and nursing note reviewed.   Constitutional:       General: He is not in acute distress.     Appearance: He is well-developed. He is not diaphoretic.   HENT:      Head: Normocephalic and atraumatic.      Nose: Nose normal. No congestion.      Mouth/Throat:      Mouth: " Mucous membranes are moist.      Pharynx: Oropharynx is clear.   Eyes:      General: No scleral icterus.     Extraocular Movements: Extraocular movements intact.      Conjunctiva/sclera: Conjunctivae normal.      Pupils: Pupils are equal, round, and reactive to light.   Cardiovascular:      Rate and Rhythm: Normal rate.   Pulmonary:      Effort: Pulmonary effort is normal.   Abdominal:      General: Abdomen is flat.   Musculoskeletal:         General: No tenderness or deformity. Normal range of motion.      Cervical back: Normal range of motion and neck supple.   Lymphadenopathy:      Cervical: No cervical adenopathy.   Skin:     General: Skin is warm and dry.      Capillary Refill: Capillary refill takes less than 2 seconds.      Findings: No rash.   Neurological:      General: No focal deficit present.      Mental Status: He is alert and oriented to person, place, and time.      Cranial Nerves: No cranial nerve deficit.      Sensory: No sensory deficit.      Coordination: Coordination normal.       ED Course, Procedures, & Data      Procedures            EKG Interpretation:      Interpreted by Tahmina Barkley                        Scheduling Type..: Person, MD  Time reviewed: per Epic  Symptoms at time of EKG: abd pain   Rhythm: normal sinus   Rate: normal  Axis: normal  Ectopy: none  Conduction: normal  ST Segments/ T Waves: No ST-T wave changes  Q Waves: none  Comparison to prior: Unchanged    Clinical Impression: normal EKG         Results for orders placed or performed during the hospital encounter of 10/11/24   Abdomen XR, 2 vw, flat and upright     Status: None    Narrative    Exam: XR ABDOMEN 2 VIEWS, 10/11/2024 7:50 AM    Indication: recent sbo    Comparison: CT abdomen and pelvis with contrast 10/10/2024 and  10/9/2024. Abdominal radiograph 10/10/2024.    Findings:   Upright and supine abdominal images. No free air on upright imaging.  Excreted contrast material in the bladder from CT studies.  Partially  visualized penile prosthesis. Air-fluid levels within gas-filled small  bowel loops centrally noted on upright radiograph, measuring up to 4.4  cm. Additional fluid filled small bowel loops on CT are not well  assessed. Gaseous distention has improved from prior radiograph  10/10/2024 where loops measured over 5 cm. There is gas and stool  throughout the colon. The gas and debris-filled distended stomach on  CT appears to have improved allowing for differences between  modalities. No acute osseous abnormalities. Basal atelectasis.      Impression    Impression: Ongoing distention of central small bowel loops with  air-fluid levels on upright imaging and gas and stool throughout the  colon consistent with persistent partial small bowel obstruction as  discussed on CT. Distention has improved compared to radiograph  10/10/2024.    APRIL ELIZONDO MD         SYSTEM ID:  [id number]   Comprehensive metabolic panel     Status: Abnormal   Result Value Ref Range    Sodium 136 135 - 145 mmol/L    Potassium 4.4 3.4 - 5.3 mmol/L    Carbon Dioxide (CO2) 25 22 - 29 mmol/L    Anion Gap 9 7 - 15 mmol/L    Urea Nitrogen 11.1 8.0 - 23.0 mg/dL    Creatinine 0.86 0.67 - 1.17 mg/dL    GFR Estimate >90 >60 mL/min/1.73m2    Calcium 9.2 8.8 - 10.4 mg/dL    Chloride 102 98 - 107 mmol/L    Glucose 147 (H) 70 - 99 mg/dL    Alkaline Phosphatase 92 40 - 150 U/L    AST      ALT 11 0 - 70 U/L    Protein Total 7.1 6.4 - 8.3 g/dL    Albumin 3.7 3.5 - 5.2 g/dL    Bilirubin Total 0.8 <=1.2 mg/dL   CBC with platelets and differential     Status: Abnormal   Result Value Ref Range    WBC Count 8.0 4.0 - 11.0 10e3/uL    RBC Count 4.38 (L) 4.40 - 5.90 10e6/uL    Hemoglobin 12.9 (L) 13.3 - 17.7 g/dL    Hematocrit 39.5 (L) 40.0 - 53.0 %    MCV 90 78 - 100 fL    MCH 29.5 26.5 - 33.0 pg    MCHC 32.7 31.5 - 36.5 g/dL    RDW 12.9 10.0 - 15.0 %    Platelet Count 355 150 - 450 10e3/uL    % Neutrophils 71 %    % Lymphocytes 21 %    % Monocytes 6 %     % Eosinophils 1 %    % Basophils 0 %    % Immature Granulocytes 0 %    NRBCs per 100 WBC 0 <1 /100    Absolute Neutrophils 5.7 1.6 - 8.3 10e3/uL    Absolute Lymphocytes 1.7 0.8 - 5.3 10e3/uL    Absolute Monocytes 0.5 0.0 - 1.3 10e3/uL    Absolute Eosinophils 0.1 0.0 - 0.7 10e3/uL    Absolute Basophils 0.0 0.0 - 0.2 10e3/uL    Absolute Immature Granulocytes 0.0 <=0.4 10e3/uL    Absolute NRBCs 0.0 10e3/uL   CBC with platelets differential     Status: Abnormal    Narrative    The following orders were created for panel order CBC with platelets differential.  Procedure                               Abnormality         Status                     ---------                               -----------         ------                     CBC with platelets and d...[[id number]]  Abnormal            Final result                 Please view results for these tests on the individual orders.     Medications - No data to display  Labs Ordered and Resulted from Time of ED Arrival to Time of ED Departure - No data to display  No orders to display          Critical care was not performed.     Medical Decision Making  The patient's presentation was of high complexity (a chronic illness severe exacerbation, progression, or side effect of treatment).    The patient's evaluation involved:  ordering and/or review of 3+ test(s) in this encounter (see separate area of note for details)    The patient's management necessitated high risk (a parenteral controlled substance).    Assessment & Plan      58 year old male with history of multiple small bowel obstructions and a recent diagnosis of the same who presents to the ED seeking evaluation of vomiting and abdominal pain.  Vital signs notable for blood pressure elevation 166/89 but otherwise stable and afebrile including normal pulse ox at 99% on room air.  EKG obtained showed normal sinus rhythm without acute ischemic changes.  IV established, labs sent which revealed decrease magnesium 1.6 but  otherwise stable CBC electrolytes.  Patient was given Ativan 1 mg IV with adequate relief of his nausea along with Pepcid 20 mg IV magnesium sulfate 2 g IV.  Upon repeat assessment patient's symptoms are improved he is requesting discharge home.    I have reviewed the nursing notes. I have reviewed the findings, diagnosis, plan and need for follow up with the patient.    New Prescriptions    No medications on file       Final diagnoses:   Abdominal pain, unspecified abdominal location       Tahmina Barkley I, Patel Alexandre, am serving as a trained medical scribe to document services personally performed by Tahmina Barkley MD based on the provider's statements to me on October 11, 2024.  This document has been checked and approved by the attending provider.    I, Tahmina Barlkey MD, was physically present and have reviewed and verified the accuracy of this note documented by Patel Alexandre medical scribe.      Tahmina Barkley MD   [facility] EMERGENCY DEPARTMENT  10/11/2024     Tahmina Barkley MD  10/13/24 1948

## 2024-10-12 NOTE — ED NOTES
Bed: GREGG  Expected date:   Expected time:   Means of arrival:   Comments:  HEMS 411 59yo M abd pain

## 2024-10-12 NOTE — ED NOTES
Pt discharged to [facility] in [place] via cab. Discharge education and instructions given, pt stated understanding. All questions and concerns addressed.

## 2024-10-12 NOTE — DISCHARGE INSTRUCTIONS
Please make an appointment to follow up with Primary Care - [facility] (phone: [phone]) in 5-7 days.

## 2024-10-14 ENCOUNTER — HOSPITAL ENCOUNTER (EMERGENCY)
Facility: CLINIC | Age: 59
Discharge: HOME | End: 2024-10-15
Attending: EMERGENCY MEDICINE | Admitting: EMERGENCY MEDICINE
Payer: COMMERCIAL

## 2024-10-14 DIAGNOSIS — R10.84 GENERALIZED ABDOMINAL PAIN: ICD-10-CM

## 2024-10-14 PROCEDURE — 96366 THER/PROPH/DIAG IV INF ADDON: CPT | Performed by: EMERGENCY MEDICINE

## 2024-10-14 PROCEDURE — 99285 EMERGENCY DEPT VISIT HI MDM: CPT | Performed by: EMERGENCY MEDICINE

## 2024-10-14 PROCEDURE — 82962 GLUCOSE BLOOD TEST: CPT

## 2024-10-14 PROCEDURE — 96365 THER/PROPH/DIAG IV INF INIT: CPT | Performed by: EMERGENCY MEDICINE

## 2024-10-14 PROCEDURE — 99285 EMERGENCY DEPT VISIT HI MDM: CPT | Mod: 25 | Performed by: EMERGENCY MEDICINE

## 2024-10-15 ENCOUNTER — APPOINTMENT (OUTPATIENT)
Dept: ULTRASOUND IMAGING | Facility: CLINIC | Age: 59
End: 2024-10-15
Attending: EMERGENCY MEDICINE
Payer: COMMERCIAL

## 2024-10-15 VITALS
HEART RATE: 96 BPM | SYSTOLIC BLOOD PRESSURE: 122 MMHG | DIASTOLIC BLOOD PRESSURE: 77 MMHG | OXYGEN SATURATION: 100 % | TEMPERATURE: 97.9 F | RESPIRATION RATE: 18 BRPM

## 2024-10-15 LAB
ALBUMIN SERPL BCG-MCNC: 4.2 G/DL (ref 3.5–5.2)
ALP SERPL-CCNC: 119 U/L (ref 40–150)
ALT SERPL W P-5'-P-CCNC: 16 U/L (ref 0–70)
ANION GAP SERPL CALCULATED.3IONS-SCNC: 13 MMOL/L (ref 7–15)
AST SERPL W P-5'-P-CCNC: 25 U/L (ref 0–45)
B-OH-BUTYR SERPL-SCNC: 0.3 MMOL/L
BASE EXCESS BLDV CALC-SCNC: 5.9 MMOL/L (ref -3–3)
BASOPHILS # BLD AUTO: 0 10E3/UL (ref 0–0.2)
BASOPHILS NFR BLD AUTO: 0 %
BILIRUB SERPL-MCNC: 1.8 MG/DL
BUN SERPL-MCNC: 17.7 MG/DL (ref 8–23)
CALCIUM SERPL-MCNC: 10.2 MG/DL (ref 8.8–10.4)
CHLORIDE SERPL-SCNC: 101 MMOL/L (ref 98–107)
CREAT SERPL-MCNC: 0.8 MG/DL (ref 0.67–1.17)
EGFRCR SERPLBLD CKD-EPI 2021: >90 ML/MIN/1.73M2
EOSINOPHIL # BLD AUTO: 0.1 10E3/UL (ref 0–0.7)
EOSINOPHIL NFR BLD AUTO: 1 %
ERYTHROCYTE [DISTWIDTH] IN BLOOD BY AUTOMATED COUNT: 12.9 % (ref 10–15)
ETHANOL SERPL-MCNC: <0.01 G/DL
GLUCOSE BLDC GLUCOMTR-MCNC: 105 MG/DL (ref 70–99)
GLUCOSE BLDC GLUCOMTR-MCNC: 114 MG/DL (ref 70–99)
GLUCOSE BLDC GLUCOMTR-MCNC: 278 MG/DL (ref 70–99)
GLUCOSE SERPL-MCNC: 104 MG/DL (ref 70–99)
HCO3 BLDV-SCNC: 31 MMOL/L (ref 21–28)
HCO3 SERPL-SCNC: 27 MMOL/L (ref 22–29)
HCT VFR BLD AUTO: 36.4 % (ref 40–53)
HGB BLD-MCNC: 12.7 G/DL (ref 13.3–17.7)
IMM GRANULOCYTES # BLD: 0 10E3/UL
IMM GRANULOCYTES NFR BLD: 0 %
LACTATE SERPL-SCNC: 0.9 MMOL/L (ref 0.7–2)
LIPASE SERPL-CCNC: 29 U/L (ref 13–60)
LYMPHOCYTES # BLD AUTO: 2.1 10E3/UL (ref 0.8–5.3)
LYMPHOCYTES NFR BLD AUTO: 23 %
MAGNESIUM SERPL-MCNC: 1.9 MG/DL (ref 1.7–2.3)
MCH RBC QN AUTO: 30.2 PG (ref 26.5–33)
MCHC RBC AUTO-ENTMCNC: 34.9 G/DL (ref 31.5–36.5)
MCV RBC AUTO: 87 FL (ref 78–100)
MONOCYTES # BLD AUTO: 0.7 10E3/UL (ref 0–1.3)
MONOCYTES NFR BLD AUTO: 8 %
NEUTROPHILS # BLD AUTO: 6.3 10E3/UL (ref 1.6–8.3)
NEUTROPHILS NFR BLD AUTO: 68 %
NRBC # BLD AUTO: 0 10E3/UL
NRBC BLD AUTO-RTO: 0 /100
O2/TOTAL GAS SETTING VFR VENT: 21 %
OXYHGB MFR BLDV: 75 % (ref 70–75)
PCO2 BLDV: 45 MM HG (ref 40–50)
PH BLDV: 7.45 [PH] (ref 7.32–7.43)
PLATELET # BLD AUTO: 368 10E3/UL (ref 150–450)
PO2 BLDV: 41 MM HG (ref 25–47)
POTASSIUM SERPL-SCNC: 3.1 MMOL/L (ref 3.4–5.3)
PROT SERPL-MCNC: 7.6 G/DL (ref 6.4–8.3)
RBC # BLD AUTO: 4.21 10E6/UL (ref 4.4–5.9)
SAO2 % BLDV: 76.7 % (ref 70–75)
SODIUM SERPL-SCNC: 141 MMOL/L (ref 135–145)
WBC # BLD AUTO: 9.3 10E3/UL (ref 4–11)

## 2024-10-15 PROCEDURE — 76705 ECHO EXAM OF ABDOMEN: CPT

## 2024-10-15 PROCEDURE — 82962 GLUCOSE BLOOD TEST: CPT

## 2024-10-15 PROCEDURE — 82805 BLOOD GASES W/O2 SATURATION: CPT | Performed by: EMERGENCY MEDICINE

## 2024-10-15 PROCEDURE — 83690 ASSAY OF LIPASE: CPT | Performed by: EMERGENCY MEDICINE

## 2024-10-15 PROCEDURE — 85025 COMPLETE CBC W/AUTO DIFF WBC: CPT | Performed by: EMERGENCY MEDICINE

## 2024-10-15 PROCEDURE — 80053 COMPREHEN METABOLIC PANEL: CPT | Performed by: EMERGENCY MEDICINE

## 2024-10-15 PROCEDURE — 36415 COLL VENOUS BLD VENIPUNCTURE: CPT | Performed by: EMERGENCY MEDICINE

## 2024-10-15 PROCEDURE — 82077 ASSAY SPEC XCP UR&BREATH IA: CPT | Performed by: EMERGENCY MEDICINE

## 2024-10-15 PROCEDURE — 250N000011 HC RX IP 250 OP 636: Performed by: EMERGENCY MEDICINE

## 2024-10-15 PROCEDURE — 83735 ASSAY OF MAGNESIUM: CPT | Performed by: EMERGENCY MEDICINE

## 2024-10-15 PROCEDURE — 83605 ASSAY OF LACTIC ACID: CPT | Performed by: EMERGENCY MEDICINE

## 2024-10-15 PROCEDURE — 82010 KETONE BODYS QUAN: CPT | Performed by: EMERGENCY MEDICINE

## 2024-10-15 PROCEDURE — 96372 THER/PROPH/DIAG INJ SC/IM: CPT | Performed by: EMERGENCY MEDICINE

## 2024-10-15 RX ORDER — POTASSIUM CHLORIDE 7.45 MG/ML
10 INJECTION INTRAVENOUS ONCE
Status: COMPLETED | OUTPATIENT
Start: 2024-10-15 | End: 2024-10-15

## 2024-10-15 RX ORDER — OLANZAPINE 10 MG/2ML
5 INJECTION, POWDER, FOR SOLUTION INTRAMUSCULAR ONCE
Status: COMPLETED | OUTPATIENT
Start: 2024-10-15 | End: 2024-10-15

## 2024-10-15 RX ORDER — POTASSIUM CHLORIDE 1.5 G/1.58G
40 POWDER, FOR SOLUTION ORAL ONCE
Status: COMPLETED | OUTPATIENT
Start: 2024-10-15 | End: 2024-10-15

## 2024-10-15 RX ORDER — POTASSIUM CHLORIDE 1.5 G/1.58G
20 POWDER, FOR SOLUTION ORAL ONCE
Status: DISCONTINUED | OUTPATIENT
Start: 2024-10-15 | End: 2024-10-15

## 2024-10-15 RX ADMIN — OLANZAPINE 5 MG: 10 INJECTION, POWDER, FOR SOLUTION INTRAMUSCULAR at 03:32

## 2024-10-15 RX ADMIN — OLANZAPINE 5 MG: 10 INJECTION, POWDER, FOR SOLUTION INTRAMUSCULAR at 01:01

## 2024-10-15 RX ADMIN — POTASSIUM CHLORIDE 10 MEQ: 7.46 INJECTION, SOLUTION INTRAVENOUS at 04:17

## 2024-10-15 ASSESSMENT — ACTIVITIES OF DAILY LIVING (ADL)
ADLS_ACUITY_SCORE: 37

## 2024-10-15 NOTE — ED NOTES
Emergency Department I-PASS Sign-out      Illness Severity: Stable    Patient Summary:  58 year old male with pertinent PMH of chronic abdominal pain and recurrent SBO who presented with ongoing abdominal pain    ED Course/treatment plan: labs with new increased bilirubin. Most recent CT with gallstone. RUQ US ordered but pt refused. Got zyprexa for agitation.     Clinical Impression:  No diagnosis found.    Edited by: Debbi Odom MD at 10/15/2024 2645    Action List:  -To do:  Imaging: RUQ US     Situational Awareness & Contingency Planning:  Code Status (Most recent):  Prior    Disposition:  to be determined. Anticipating discharge if US negative.     Edited by: Debbi Odom MD at 10/15/2024 [id number]    Synthesis & Events after sign-out:  Patient's right upper quadrant ultrasound is negative.  Patient is well-appearing.  Patient be discharged home with outpatient follow-up.    Results for orders placed or performed during the hospital encounter of 10/14/24   US Abdomen Limited     Status: None    Narrative    US ABDOMEN LIMITED 10/15/2024 9:38 AM    CLINICAL HISTORY: abd pain; elevated total bilirubin; recent gallstone  TECHNIQUE: Limited abdominal ultrasound.    COMPARISON: CT of the abdomen and pelvis on 10/10/2024    FINDINGS:    GALLBLADDER: The small calcified stone in the gallbladder neck seen on  CT is not visible by ultrasound. No gallbladder wall thickening, or  pericholecystic fluid. Negative sonographic Mendes's sign.    BILE DUCTS: There is no biliary dilatation. The common duct measures  3mm.    LIVER: Normal echogenicity and echotexture. No focal masses.    RIGHT KIDNEY: No hydronephrosis.    PANCREAS: The pancreas is largely obscured by overlying gas.      Impression    IMPRESSION:  1.  The small calcified stone in the gallbladder neck seen on the  recent CT was not visible by ultrasound. No evidence for acute  cholecystitis. No biliary ductal dilatation.    ISAÍAS METCALF MD          SYSTEM ID:  [id number]   Comprehensive metabolic panel     Status: Abnormal   Result Value Ref Range    Sodium 141 135 - 145 mmol/L    Potassium 3.1 (L) 3.4 - 5.3 mmol/L    Carbon Dioxide (CO2) 27 22 - 29 mmol/L    Anion Gap 13 7 - 15 mmol/L    Urea Nitrogen 17.7 8.0 - 23.0 mg/dL    Creatinine 0.80 0.67 - 1.17 mg/dL    GFR Estimate >90 >60 mL/min/1.73m2    Calcium 10.2 8.8 - 10.4 mg/dL    Chloride 101 98 - 107 mmol/L    Glucose 104 (H) 70 - 99 mg/dL    Alkaline Phosphatase 119 40 - 150 U/L    AST 25 0 - 45 U/L    ALT 16 0 - 70 U/L    Protein Total 7.6 6.4 - 8.3 g/dL    Albumin 4.2 3.5 - 5.2 g/dL    Bilirubin Total 1.8 (H) <=1.2 mg/dL   Lipase     Status: Normal   Result Value Ref Range    Lipase 29 13 - 60 U/L   Lactic acid whole blood with 1x repeat in 2 hr when >2     Status: Normal   Result Value Ref Range    Lactic Acid, Initial 0.9 0.7 - 2.0 mmol/L   Blood gas venous     Status: Abnormal   Result Value Ref Range    pH Venous 7.45 (H) 7.32 - 7.43    pCO2 Venous 45 40 - 50 mm Hg    pO2 Venous 41 25 - 47 mm Hg    Bicarbonate Venous 31 (H) 21 - 28 mmol/L    Base Excess/Deficit Venous 5.9 (H) -3.0 - 3.0 mmol/L    FIO2 21     Oxyhemoglobin Venous 75 70 - 75 %    O2 Sat, Venous 76.7 (H) 70.0 - 75.0 %    Narrative    In healthy individuals, oxyhemoglobin (O2Hb) and oxygen saturation (SO2) are approximately equal. In the presence of dyshemoglobins, oxyhemoglobin can be considerably lower than oxygen saturation.   Ketone Beta-Hydroxybutyrate Quantitative     Status: Normal   Result Value Ref Range    Ketone (Beta-Hydroxybutyrate) Quantitative 0.30 <=0.30 mmol/L   Glucose by meter     Status: Abnormal   Result Value Ref Range    GLUCOSE BY METER POCT 105 (H) 70 - 99 mg/dL   Alcohol level blood     Status: Normal   Result Value Ref Range    Alcohol ethyl <0.01 <=0.01 g/dL   Magnesium     Status: Normal   Result Value Ref Range    Magnesium 1.9 1.7 - 2.3 mg/dL   CBC with platelets and differential     Status: Abnormal    Result Value Ref Range    WBC Count 9.3 4.0 - 11.0 10e3/uL    RBC Count 4.21 (L) 4.40 - 5.90 10e6/uL    Hemoglobin 12.7 (L) 13.3 - 17.7 g/dL    Hematocrit 36.4 (L) 40.0 - 53.0 %    MCV 87 78 - 100 fL    MCH 30.2 26.5 - 33.0 pg    MCHC 34.9 31.5 - 36.5 g/dL    RDW 12.9 10.0 - 15.0 %    Platelet Count 368 150 - 450 10e3/uL    % Neutrophils 68 %    % Lymphocytes 23 %    % Monocytes 8 %    % Eosinophils 1 %    % Basophils 0 %    % Immature Granulocytes 0 %    NRBCs per 100 WBC 0 <1 /100    Absolute Neutrophils 6.3 1.6 - 8.3 10e3/uL    Absolute Lymphocytes 2.1 0.8 - 5.3 10e3/uL    Absolute Monocytes 0.7 0.0 - 1.3 10e3/uL    Absolute Eosinophils 0.1 0.0 - 0.7 10e3/uL    Absolute Basophils 0.0 0.0 - 0.2 10e3/uL    Absolute Immature Granulocytes 0.0 <=0.4 10e3/uL    Absolute NRBCs 0.0 10e3/uL   Glucose by meter     Status: Abnormal   Result Value Ref Range    GLUCOSE BY METER POCT 114 (H) 70 - 99 mg/dL   CBC with platelets differential     Status: Abnormal    Narrative    The following orders were created for panel order CBC with platelets differential.  Procedure                               Abnormality         Status                     ---------                               -----------         ------                     CBC with platelets and d...[[id number]]  Abnormal            Final result                 Please view results for these tests on the individual orders.     Medications   OLANZapine (zyPREXA) injection 5 mg (5 mg Intramuscular $Given 10/15/24 0101)   potassium chloride (KLOR-CON) Packet 40 mEq (40 mEq Oral Not Given 10/15/24 0306)   OLANZapine (zyPREXA) injection 5 mg (5 mg Intramuscular $Given 10/15/24 0332)   potassium chloride (KLOR-CON) Packet 40 mEq (40 mEq Oral Not Given 10/15/24 0334)   potassium chloride 10 mEq in 100 mL sterile water infusion (10 mEq Intravenous $New Bag 10/15/24 8518)             Analilia Galeas MD   Emergency Medicine     Analilia Galeas MD  10/15/24 4856

## 2024-10-15 NOTE — ED TRIAGE NOTES
Severe L abd pain; pt thinks he has a bowel obstruction. Reports he was discharged yesterday from the hospital but now he is worse. Pt restless, moaning during triage. Reports last BM yesterday. Emesis x8 today. Concerned for high BG.     Triage Assessment (Adult)       Row Name 10/14/24 2498          Triage Assessment    Airway WDL WDL        Respiratory WDL    Respiratory WDL WDL        Skin Circulation/Temperature WDL    Skin Circulation/Temperature WDL WDL        Cardiac WDL    Cardiac WDL WDL        Peripheral/Neurovascular WDL    Peripheral Neurovascular WDL WDL        Cognitive/Neuro/Behavioral WDL    Cognitive/Neuro/Behavioral WDL WDL

## 2024-10-15 NOTE — DISCHARGE INSTRUCTIONS
Your abdominal ultrasound does not show any active problems with your gallbladder.     Please follow up with you primary care doctor for further care.

## 2024-10-15 NOTE — ED PROVIDER NOTES
ED Provider Note  [facility]      History     Chief Complaint   Patient presents with    Abdominal Pain     Severe L abd pain; pt thinks he has a bowel obstruction. Reports he was discharged yesterday from the hospital but now he is worse.     Hyperglycemia     Pt reports his BG was 540, took 37 units insulin ~1 hour ago.     HPI  Andrew Collier is a 58 year old male who presents with abdominal pain.  He was recently admitted with a distended stomach and had upper endoscopy that showed no gastric outlet obstruction but esophagitis and he was started on PPI.  He also has a resolving small bowel obstruction.  He last passed gas and bowel movement yesterday.  He has abdominal pain today prompting presentation to the hospital.  States he is having hard time eating and drinking but is unclear if he has been vomiting today.  In addition in triage sheet indicated that he was hypoglycemic prior to arrival with blood sugar of 540 so took 37 units of insulin about an hour prior to arrival.  Initial blood sugar on our assessment was 100.      Past Medical History  Past Medical History:   Diagnosis Date    Acute appendicitis with localized peritonitis 1/31/2018    AIDS (H)     Allergic rhinitis due to other allergen     DNS    Anal dysplasia     Chronic abdominal pain     CNS toxoplasmosis (H)     COVID-19 1/11/2022    Diabetes type 2, controlled (H)     GERD (gastroesophageal reflux disease)     Herpes zoster 9/23/2016    History of seizure     History of substance abuse (H)     HIV (human immunodeficiency virus infection) (H)     HLD (hyperlipidemia)     Lung nodules     Periungual wart     Pneumonia 1/6/2019    PTSD (post-traumatic stress disorder)     Sleep apnea     doesn't use CPAP     Past Surgical History:   Procedure Laterality Date    ANOSCOPY N/A 9/9/2020    Procedure: Exam Under Anesthesia, ANOSCOPY, fulgeration of rectal fissures with Rectal Biopsies;  Surgeon: Thanh Lundberg,  MD;  Location: [facility]    COLONOSCOPY Left 1/22/2016    Procedure: COMBINED COLONOSCOPY, SINGLE OR MULTIPLE BIOPSY/POLYPECTOMY BY BIOPSY;  Surgeon: Clark Saini MD;  Location: [facility]    ESOPHAGOSCOPY, GASTROSCOPY, DUODENOSCOPY (EGD), COMBINED N/A 6/6/2022    Procedure: ESOPHAGOGASTRODUODENOSCOPY, WITH BIOPSY;  Surgeon: Kecia Benítez MD;  Location: [facility]    ESOPHAGOSCOPY, GASTROSCOPY, DUODENOSCOPY (EGD), COMBINED N/A 10/10/2024    Procedure: Esophagoscopy, gastroscopy, duodenoscopy (EGD), combined;  Surgeon: Nico Bhatti DO;  Location: [facility]    HC EXPLORE UNDESC TESTIS,INGUIN/SCROTAL      LAPAROSCOPIC APPENDECTOMY N/A 1/31/2018    Procedure: LAPAROSCOPIC APPENDECTOMY;  LAPAROSCOPIC APPENDECTOMY;  Surgeon: Dawn Holt MD;  Location: [facility]    LAPAROSCOPY DIAGNOSTIC (GENERAL) N/A 7/26/2016    Procedure: LAPAROSCOPY DIAGNOSTIC (GENERAL);  Surgeon: Susannah Arriaga MD;  Location: [facility]    LAPAROSCOPY DIAGNOSTIC (GENERAL) N/A 4/16/2018    Procedure: LAPAROSCOPY DIAGNOSTIC (GENERAL);  Diagnostic laparoscopy and lysis of adhesions;  Surgeon: Prince Dowling MD;  Location: [facility] OR    OPTICAL TRACKING SYSTEM CRANIOTOMY, EXCISE TUMOR, COMBINED Left 4/10/2015    Procedure: COMBINED OPTICAL TRACKING SYSTEM CRANIOTOMY, EXCISE TUMOR;  Surgeon: Mirlande Colmenares MD;  Location: [facility]    REPAIR GAMEKEEPER'S THUMB Right 12/2/2016    Procedure: REPAIR LIGAMENT ULNAR COLLATERAL THUMB (GAMEKEEPER'S);  Surgeon: Evin Zamorano MD;  [facility]: [facility]    ZZC NONSPECIFIC PROCEDURE      right forearm fracture     bictegravir-emtricitabine-tenofovir (BIKTARVY) -25 MG per tablet  blood glucose (NO BRAND SPECIFIED) lancets standard  blood glucose (NO BRAND SPECIFIED) test strip  blood glucose monitoring (NO BRAND SPECIFIED) meter device kit  Continuous Glucose Sensor (DEXCOM G6 SENSOR) MISC  Continuous Glucose Sensor (DEXCOM G7 SENSOR) MISC  Continuous Glucose Transmitter (DEXCOM G6 TRANSMITTER)  "MISC  famotidine (PEPCID) 40 MG tablet  glipiZIDE (GLUCOTROL XL) 10 MG 24 hr tablet  insulin glargine (LANTUS PEN) 100 UNIT/ML pen  insulin pen needle (32G X 4 MM) 32G X 4 MM miscellaneous  Nutritional Supplements (ENSURE ACTIVE) LIQD      Allergies   Allergen Reactions    Fentanyl Blisters     Per pt, after taking this medication had blisters develope    Tylenol [Acetaminophen] Itching    Dulaglutide Rash    Insulin Lispro Rash     Patient reported    Penicillin V Other (See Comments) and Rash     Diffuse maculopapular rash + feels \"high\", per pt.      Family History  Family History   Problem Relation Age of Onset    Diabetes Brother     Diabetes Father     Alzheimer Disease Father     Unknown/Adopted Mother     Diabetes Paternal Grandfather     Cancer No family hx of         no skin cancer    Skin Cancer No family hx of         no famiy hx of skin cancer    Glaucoma No family hx of     Macular Degeneration No family hx of      Social History   Social History     Tobacco Use    Smoking status: Some Days     Current packs/day: 0.25     Average packs/day: 0.3 packs/day for 40.0 years (10.0 ttl pk-yrs)     Types: Cigarettes    Smokeless tobacco: Former   Substance Use Topics    Alcohol use: No     Alcohol/week: 0.0 standard drinks of alcohol     Comment: Last etoh in 2007    Drug use: Not Currently     Types: Marijuana     Comment: chart indicated meth use but pt denies; states he does not use drugs      A medically appropriate review of systems was performed with pertinent positives and negatives noted in the HPI, and all other systems negative.    Physical Exam   BP: (!) 143/85  Pulse: 72  Temp: 97.9  F (36.6  C)  Resp: 20  SpO2: 96 %    Physical Exam  Gen:A&Ox3, uncomfortable  HEENT:PERRL, no facial tenderness or wounds, head atraumatic, oropharynx clear, mucous membranes moist, TMs clear bilaterally  Neck:no bony tenderness or step offs, no JVD, trachea midline  Back: no CVA tenderness, no midline bony " tenderness  CV:RRR without murmurs  PULM:Clear to auscultation bilaterally  Abd: soft, pain in epigastrium and LLQ  UE:No traumatic injuries, skin normal  LE:no traumatic injuries, skin normal, no LE edema  Neuro:CN II-XII intact, strength 5/5 throughout  Skin: no rashes or ecchymoses    ED Course, Procedures, & Data      Procedures     Results for orders placed or performed during the hospital encounter of 10/14/24   US Abdomen Limited     Status: None    Narrative    US ABDOMEN LIMITED 10/15/2024 9:38 AM    CLINICAL HISTORY: abd pain; elevated total bilirubin; recent gallstone  TECHNIQUE: Limited abdominal ultrasound.    COMPARISON: CT of the abdomen and pelvis on 10/10/2024    FINDINGS:    GALLBLADDER: The small calcified stone in the gallbladder neck seen on  CT is not visible by ultrasound. No gallbladder wall thickening, or  pericholecystic fluid. Negative sonographic Mendes's sign.    BILE DUCTS: There is no biliary dilatation. The common duct measures  3mm.    LIVER: Normal echogenicity and echotexture. No focal masses.    RIGHT KIDNEY: No hydronephrosis.    PANCREAS: The pancreas is largely obscured by overlying gas.      Impression    IMPRESSION:  1.  The small calcified stone in the gallbladder neck seen on the  recent CT was not visible by ultrasound. No evidence for acute  cholecystitis. No biliary ductal dilatation.    ISAÍAS METCALF MD         SYSTEM ID:  [id number]   Comprehensive metabolic panel     Status: Abnormal   Result Value Ref Range    Sodium 141 135 - 145 mmol/L    Potassium 3.1 (L) 3.4 - 5.3 mmol/L    Carbon Dioxide (CO2) 27 22 - 29 mmol/L    Anion Gap 13 7 - 15 mmol/L    Urea Nitrogen 17.7 8.0 - 23.0 mg/dL    Creatinine 0.80 0.67 - 1.17 mg/dL    GFR Estimate >90 >60 mL/min/1.73m2    Calcium 10.2 8.8 - 10.4 mg/dL    Chloride 101 98 - 107 mmol/L    Glucose 104 (H) 70 - 99 mg/dL    Alkaline Phosphatase 119 40 - 150 U/L    AST 25 0 - 45 U/L    ALT 16 0 - 70 U/L    Protein Total 7.6 6.4 - 8.3  g/dL    Albumin 4.2 3.5 - 5.2 g/dL    Bilirubin Total 1.8 (H) <=1.2 mg/dL   Lipase     Status: Normal   Result Value Ref Range    Lipase 29 13 - 60 U/L   Lactic acid whole blood with 1x repeat in 2 hr when >2     Status: Normal   Result Value Ref Range    Lactic Acid, Initial 0.9 0.7 - 2.0 mmol/L   Blood gas venous     Status: Abnormal   Result Value Ref Range    pH Venous 7.45 (H) 7.32 - 7.43    pCO2 Venous 45 40 - 50 mm Hg    pO2 Venous 41 25 - 47 mm Hg    Bicarbonate Venous 31 (H) 21 - 28 mmol/L    Base Excess/Deficit Venous 5.9 (H) -3.0 - 3.0 mmol/L    FIO2 21     Oxyhemoglobin Venous 75 70 - 75 %    O2 Sat, Venous 76.7 (H) 70.0 - 75.0 %    Narrative    In healthy individuals, oxyhemoglobin (O2Hb) and oxygen saturation (SO2) are approximately equal. In the presence of dyshemoglobins, oxyhemoglobin can be considerably lower than oxygen saturation.   Ketone Beta-Hydroxybutyrate Quantitative     Status: Normal   Result Value Ref Range    Ketone (Beta-Hydroxybutyrate) Quantitative 0.30 <=0.30 mmol/L   Glucose by meter     Status: Abnormal   Result Value Ref Range    GLUCOSE BY METER POCT 105 (H) 70 - 99 mg/dL   Alcohol level blood     Status: Normal   Result Value Ref Range    Alcohol ethyl <0.01 <=0.01 g/dL   Magnesium     Status: Normal   Result Value Ref Range    Magnesium 1.9 1.7 - 2.3 mg/dL   CBC with platelets and differential     Status: Abnormal   Result Value Ref Range    WBC Count 9.3 4.0 - 11.0 10e3/uL    RBC Count 4.21 (L) 4.40 - 5.90 10e6/uL    Hemoglobin 12.7 (L) 13.3 - 17.7 g/dL    Hematocrit 36.4 (L) 40.0 - 53.0 %    MCV 87 78 - 100 fL    MCH 30.2 26.5 - 33.0 pg    MCHC 34.9 31.5 - 36.5 g/dL    RDW 12.9 10.0 - 15.0 %    Platelet Count 368 150 - 450 10e3/uL    % Neutrophils 68 %    % Lymphocytes 23 %    % Monocytes 8 %    % Eosinophils 1 %    % Basophils 0 %    % Immature Granulocytes 0 %    NRBCs per 100 WBC 0 <1 /100    Absolute Neutrophils 6.3 1.6 - 8.3 10e3/uL    Absolute Lymphocytes 2.1 0.8 - 5.3  10e3/uL    Absolute Monocytes 0.7 0.0 - 1.3 10e3/uL    Absolute Eosinophils 0.1 0.0 - 0.7 10e3/uL    Absolute Basophils 0.0 0.0 - 0.2 10e3/uL    Absolute Immature Granulocytes 0.0 <=0.4 10e3/uL    Absolute NRBCs 0.0 10e3/uL   Glucose by meter     Status: Abnormal   Result Value Ref Range    GLUCOSE BY METER POCT 114 (H) 70 - 99 mg/dL   Glucose by meter     Status: Abnormal   Result Value Ref Range    GLUCOSE BY METER POCT 278 (H) 70 - 99 mg/dL   CBC with platelets differential     Status: Abnormal    Narrative    The following orders were created for panel order CBC with platelets differential.  Procedure                               Abnormality         Status                     ---------                               -----------         ------                     CBC with platelets and d...[[id number]]  Abnormal            Final result                 Please view results for these tests on the individual orders.     Medications   OLANZapine (zyPREXA) injection 5 mg (5 mg Intramuscular $Given 10/15/24 0101)   potassium chloride (KLOR-CON) Packet 40 mEq (40 mEq Oral Not Given 10/15/24 0306)   OLANZapine (zyPREXA) injection 5 mg (5 mg Intramuscular $Given 10/15/24 0332)   potassium chloride (KLOR-CON) Packet 40 mEq (40 mEq Oral Not Given 10/15/24 0334)   potassium chloride 10 mEq in 100 mL sterile water infusion (0 mEq Intravenous Stopped 10/15/24 1054)     Labs Ordered and Resulted from Time of ED Arrival to Time of ED Departure   COMPREHENSIVE METABOLIC PANEL - Abnormal       Result Value    Sodium 141      Potassium 3.1 (*)     Carbon Dioxide (CO2) 27      Anion Gap 13      Urea Nitrogen 17.7      Creatinine 0.80      GFR Estimate >90      Calcium 10.2      Chloride 101      Glucose 104 (*)     Alkaline Phosphatase 119      AST 25      ALT 16      Protein Total 7.6      Albumin 4.2      Bilirubin Total 1.8 (*)    BLOOD GAS VENOUS - Abnormal    pH Venous 7.45 (*)     pCO2 Venous 45      pO2 Venous 41       Bicarbonate Venous 31 (*)     Base Excess/Deficit Venous 5.9 (*)     FIO2 21      Oxyhemoglobin Venous 75      O2 Sat, Venous 76.7 (*)    GLUCOSE BY METER - Abnormal    GLUCOSE BY METER POCT 105 (*)    CBC WITH PLATELETS AND DIFFERENTIAL - Abnormal    WBC Count 9.3      RBC Count 4.21 (*)     Hemoglobin 12.7 (*)     Hematocrit 36.4 (*)     MCV 87      MCH 30.2      MCHC 34.9      RDW 12.9      Platelet Count 368      % Neutrophils 68      % Lymphocytes 23      % Monocytes 8      % Eosinophils 1      % Basophils 0      % Immature Granulocytes 0      NRBCs per 100 WBC 0      Absolute Neutrophils 6.3      Absolute Lymphocytes 2.1      Absolute Monocytes 0.7      Absolute Eosinophils 0.1      Absolute Basophils 0.0      Absolute Immature Granulocytes 0.0      Absolute NRBCs 0.0     GLUCOSE BY METER - Abnormal    GLUCOSE BY METER POCT 114 (*)    GLUCOSE BY METER - Abnormal    GLUCOSE BY METER POCT 278 (*)    LIPASE - Normal    Lipase 29     LACTIC ACID WHOLE BLOOD WITH 1X REPEAT IN 2 HR WHEN >2 - Normal    Lactic Acid, Initial 0.9     KETONE BETA-HYDROXYBUTYRATE QUANTITATIVE, RAPID - Normal    Ketone (Beta-Hydroxybutyrate) Quantitative 0.30     ETHYL ALCOHOL LEVEL - Normal    Alcohol ethyl <0.01     MAGNESIUM - Normal    Magnesium 1.9     GLUCOSE MONITOR NURSING POCT     US Abdomen Limited   Final Result   IMPRESSION:   1.  The small calcified stone in the gallbladder neck seen on the   recent CT was not visible by ultrasound. No evidence for acute   cholecystitis. No biliary ductal dilatation.      ISAÍAS METCALF MD            SYSTEM ID:  [id number]             Critical care was not performed.     Medical Decision Making  The patient's presentation was of high complexity (a chronic illness severe exacerbation, progression, or side effect of treatment).    The patient's evaluation involved:  review of external note(s) from 2 sources (most recent ED notes and recent discharge summary)  review of 2 test result(s) ordered  prior to this encounter (labs from recent hospitalization, and most recent CT A/P)    The patient's management necessitated high risk (a parenteral controlled substance).    Assessment & Plan    58-year-old male well-known to us in the emergency department with recurrent small bowel obstruction and chronic abdominal pain who presents for abdominal pain.  He arrives afebrile and hemodynamically stable.  IV access obtained and laboratory testing included CBC, comprehensive metabolic panel, lipase, lactic acid, blood sugar of 100.  Recent hyperglycemia so additional testing included a VBG and ketones.  UA, urine drug screen, alcohol level.  Will plan to treat with Zyprexa as needed, monitor and reassess.      1:47 AM  Labs reviewed. K 3.1, replaced orally.   Bilirubin has doubled since it was last checked on 10/11 and last CT A/P with gallstones in the neck without signs of acute cholecystitis. Given epigastric pain will get RUQ US for further evaluation.     Pt declined US, then fell asleep.  Signed out to AM physician with plan for RUQ US when awake. Disposition pending this study.       I have reviewed the nursing notes. I have reviewed the findings, diagnosis, plan and need for follow up with the patient.    Discharge Medication List as of 10/15/2024 10:54 AM          Final diagnoses:   Generalized abdominal pain       Debbi Odom MD  [facility] EMERGENCY DEPARTMENT  10/14/2024     Debbi Odom MD  10/21/24 1758

## 2024-10-21 ENCOUNTER — APPOINTMENT (OUTPATIENT)
Dept: CT IMAGING | Facility: CLINIC | Age: 59
End: 2024-10-21
Attending: EMERGENCY MEDICINE
Payer: COMMERCIAL

## 2024-10-21 ENCOUNTER — HOSPITAL ENCOUNTER (EMERGENCY)
Facility: CLINIC | Age: 59
Discharge: HOME | End: 2024-10-21
Attending: EMERGENCY MEDICINE | Admitting: EMERGENCY MEDICINE
Payer: COMMERCIAL

## 2024-10-21 ENCOUNTER — APPOINTMENT (OUTPATIENT)
Dept: ULTRASOUND IMAGING | Facility: CLINIC | Age: 59
End: 2024-10-21
Attending: EMERGENCY MEDICINE
Payer: COMMERCIAL

## 2024-10-21 VITALS
DIASTOLIC BLOOD PRESSURE: 80 MMHG | TEMPERATURE: 97.9 F | RESPIRATION RATE: 18 BRPM | SYSTOLIC BLOOD PRESSURE: 162 MMHG | OXYGEN SATURATION: 100 % | HEART RATE: 105 BPM

## 2024-10-21 DIAGNOSIS — R10.84 GENERALIZED ABDOMINAL PAIN: ICD-10-CM

## 2024-10-21 DIAGNOSIS — R19.7 DIARRHEA, UNSPECIFIED TYPE: ICD-10-CM

## 2024-10-21 LAB
ALBUMIN SERPL BCG-MCNC: 3.7 G/DL (ref 3.5–5.2)
ALP SERPL-CCNC: 146 U/L (ref 40–150)
ALT SERPL W P-5'-P-CCNC: 16 U/L (ref 0–70)
ANION GAP SERPL CALCULATED.3IONS-SCNC: 13 MMOL/L (ref 7–15)
AST SERPL W P-5'-P-CCNC: 25 U/L (ref 0–45)
BASOPHILS # BLD AUTO: 0.1 10E3/UL (ref 0–0.2)
BASOPHILS NFR BLD AUTO: 1 %
BILIRUB SERPL-MCNC: 0.3 MG/DL
BUN SERPL-MCNC: 12.5 MG/DL (ref 8–23)
CALCIUM SERPL-MCNC: 9.4 MG/DL (ref 8.8–10.4)
CHLORIDE SERPL-SCNC: 103 MMOL/L (ref 98–107)
CREAT SERPL-MCNC: 0.72 MG/DL (ref 0.67–1.17)
EGFRCR SERPLBLD CKD-EPI 2021: >90 ML/MIN/1.73M2
EOSINOPHIL # BLD AUTO: 0.2 10E3/UL (ref 0–0.7)
EOSINOPHIL NFR BLD AUTO: 3 %
ERYTHROCYTE [DISTWIDTH] IN BLOOD BY AUTOMATED COUNT: 13 % (ref 10–15)
GLUCOSE BLDC GLUCOMTR-MCNC: 193 MG/DL (ref 70–99)
GLUCOSE SERPL-MCNC: 367 MG/DL (ref 70–99)
HCO3 SERPL-SCNC: 17 MMOL/L (ref 22–29)
HCT VFR BLD AUTO: 38 % (ref 40–53)
HGB BLD-MCNC: 12.3 G/DL (ref 13.3–17.7)
HOLD SPECIMEN: NORMAL
IMM GRANULOCYTES # BLD: 0.1 10E3/UL
IMM GRANULOCYTES NFR BLD: 1 %
INR PPP: 0.99 (ref 0.85–1.15)
LACTATE SERPL-SCNC: 1.7 MMOL/L (ref 0.7–2)
LIPASE SERPL-CCNC: 73 U/L (ref 13–60)
LYMPHOCYTES # BLD AUTO: 2 10E3/UL (ref 0.8–5.3)
LYMPHOCYTES NFR BLD AUTO: 29 %
MAGNESIUM SERPL-MCNC: 1.7 MG/DL (ref 1.7–2.3)
MCH RBC QN AUTO: 29.5 PG (ref 26.5–33)
MCHC RBC AUTO-ENTMCNC: 32.4 G/DL (ref 31.5–36.5)
MCV RBC AUTO: 91 FL (ref 78–100)
MONOCYTES # BLD AUTO: 0.6 10E3/UL (ref 0–1.3)
MONOCYTES NFR BLD AUTO: 8 %
NEUTROPHILS # BLD AUTO: 4 10E3/UL (ref 1.6–8.3)
NEUTROPHILS NFR BLD AUTO: 58 %
NRBC # BLD AUTO: 0 10E3/UL
NRBC BLD AUTO-RTO: 0 /100
PLATELET # BLD AUTO: 347 10E3/UL (ref 150–450)
POTASSIUM SERPL-SCNC: 4.1 MMOL/L (ref 3.4–5.3)
PROT SERPL-MCNC: 7 G/DL (ref 6.4–8.3)
RBC # BLD AUTO: 4.17 10E6/UL (ref 4.4–5.9)
SODIUM SERPL-SCNC: 133 MMOL/L (ref 135–145)
WBC # BLD AUTO: 6.8 10E3/UL (ref 4–11)

## 2024-10-21 PROCEDURE — 85004 AUTOMATED DIFF WBC COUNT: CPT | Performed by: EMERGENCY MEDICINE

## 2024-10-21 PROCEDURE — 83690 ASSAY OF LIPASE: CPT | Performed by: EMERGENCY MEDICINE

## 2024-10-21 PROCEDURE — 82310 ASSAY OF CALCIUM: CPT | Performed by: EMERGENCY MEDICINE

## 2024-10-21 PROCEDURE — 74177 CT ABD & PELVIS W/CONTRAST: CPT | Mod: 26 | Performed by: RADIOLOGY

## 2024-10-21 PROCEDURE — 250N000011 HC RX IP 250 OP 636: Performed by: EMERGENCY MEDICINE

## 2024-10-21 PROCEDURE — 83735 ASSAY OF MAGNESIUM: CPT | Performed by: EMERGENCY MEDICINE

## 2024-10-21 PROCEDURE — 99285 EMERGENCY DEPT VISIT HI MDM: CPT | Performed by: EMERGENCY MEDICINE

## 2024-10-21 PROCEDURE — 250N000013 HC RX MED GY IP 250 OP 250 PS 637: Performed by: EMERGENCY MEDICINE

## 2024-10-21 PROCEDURE — 74177 CT ABD & PELVIS W/CONTRAST: CPT

## 2024-10-21 PROCEDURE — 82962 GLUCOSE BLOOD TEST: CPT

## 2024-10-21 PROCEDURE — 36415 COLL VENOUS BLD VENIPUNCTURE: CPT | Performed by: EMERGENCY MEDICINE

## 2024-10-21 PROCEDURE — 85610 PROTHROMBIN TIME: CPT | Performed by: EMERGENCY MEDICINE

## 2024-10-21 PROCEDURE — 99285 EMERGENCY DEPT VISIT HI MDM: CPT | Mod: 25 | Performed by: EMERGENCY MEDICINE

## 2024-10-21 PROCEDURE — 76705 ECHO EXAM OF ABDOMEN: CPT | Mod: 26 | Performed by: RADIOLOGY

## 2024-10-21 PROCEDURE — 83605 ASSAY OF LACTIC ACID: CPT | Performed by: EMERGENCY MEDICINE

## 2024-10-21 PROCEDURE — 76705 ECHO EXAM OF ABDOMEN: CPT

## 2024-10-21 PROCEDURE — 96374 THER/PROPH/DIAG INJ IV PUSH: CPT | Mod: 59 | Performed by: EMERGENCY MEDICINE

## 2024-10-21 RX ORDER — IOPAMIDOL 755 MG/ML
101 INJECTION, SOLUTION INTRAVASCULAR ONCE
Status: COMPLETED | OUTPATIENT
Start: 2024-10-21 | End: 2024-10-21

## 2024-10-21 RX ORDER — ONDANSETRON 2 MG/ML
4 INJECTION INTRAMUSCULAR; INTRAVENOUS ONCE
Status: COMPLETED | OUTPATIENT
Start: 2024-10-21 | End: 2024-10-21

## 2024-10-21 RX ORDER — OXYCODONE HYDROCHLORIDE 5 MG/1
5 TABLET ORAL ONCE
Status: COMPLETED | OUTPATIENT
Start: 2024-10-21 | End: 2024-10-21

## 2024-10-21 RX ADMIN — ONDANSETRON 4 MG: 2 INJECTION INTRAMUSCULAR; INTRAVENOUS at 11:31

## 2024-10-21 RX ADMIN — OXYCODONE HYDROCHLORIDE 5 MG: 5 TABLET ORAL at 11:31

## 2024-10-21 RX ADMIN — IOPAMIDOL 101 ML: 755 INJECTION, SOLUTION INTRAVENOUS at 13:50

## 2024-10-21 ASSESSMENT — ACTIVITIES OF DAILY LIVING (ADL)
ADLS_ACUITY_SCORE: 37

## 2024-10-21 NOTE — ED PROVIDER NOTES
[place] EMERGENCY DEPARTMENT ([facility])    10/21/24       ED PROVIDER NOTE   History     Chief Complaint   Patient presents with    Abdominal Pain     The history is provided by the patient and medical records.     Andrew Collier is a 58 year old male with a past medical history of HIV, type 2 diabetes mellitus, GERD, and multiple small bowel obstructions who presents to the emergency department for abdominal pain and diarrhea. Patient reports abdominal pain upon waking up this morning that has been persistent since. Additionally, he has had three bowel movements today consisting of green diarrhea. Denies presence of blood in stool. Denies fever, urinary symptoms, or recent antibiotic use.     Per chart review, patient was admitted at [facility] from 10/7/24 - 10/10/24 for abdominal pain, vomiting, and nausea. EDG performed on 10/10 showed LA grade B esophagitis and hiatal hernia. A CT scan was performed notable for concern of small bowel obstruction. Surgery was consulted. However, patient left AMA on 10/10. Patient has had a few ED visits following hospitalization. Most recent CT abdomen pelvis performed on 10/10.     CT ABDOMEN PELVIS W CONTRAST 10/10/2024  Impression:   1. Unchanged gastric distention however no obvious complication from earlier EGD.  2. Partial small bowel obstruction, resolving, significantly improved since yesterday 10/9/2024 CT. There is a transition point in the small  bowel in the left upper abdomen.   3. Esophageal wall thickening consistent with esophagitis as demonstrated on upper endoscopy.  4. Cholelithiasis with the gallstone near the gallbladder neck, without acute cholecystitis.  5. Diverticulosis without acute diverticulitis.    Physical Exam   BP: (!) 162/80  Pulse: 105  Temp: 97.9  F (36.6  C)  Resp: 18  SpO2: 100 %  Physical Exam  General: patient is alert and oriented and in no acute distress   Head: atraumatic and normocephalic   EENT: moist mucus  membranes, sclera anicteric  Neck: supple   Cardiovascular: regular rate and rhythm, extremities warm and well perfused, no lower extremity edema  Pulmonary: lungs clear to auscultation bilaterally   Abdomen: soft, generalized tenderness to palpation, no guarding or rebound tenderness, no CVA tenderness  Musculoskeletal: normal range of motion   Neurological: alert and oriented, moving all extremities symmetrically, gait normal   Skin: warm, dry      ED Course, Procedures, & Data      Procedures            No results found for any visits on 10/21/24.  Medications - No data to display  Labs Ordered and Resulted from Time of ED Arrival to Time of ED Departure - No data to display  No orders to display          Critical care was not performed.     Medical Decision Making  The patient's presentation was of high complexity (an acute health issue posing potential threat to life or bodily function).    The patient's evaluation involved:  ordering and/or review of 3+ test(s) in this encounter (see separate area of note for details)  independent interpretation of testing performed by another health professional (abd CT)    The patient's management necessitated moderate risk (prescription drug management including medications given in the ED) and moderate risk (IV contrast administration).    Assessment & Plan    Mr. Fabián Collier is a 58 year old male with a past medical history of HIV, type 2 diabetes mellitus, GERD, and multiple small bowel obstructions who presents to the emergency department for abdominal pain and diarrhea.  He is noted to be moderately hypertensive otherwise hemodynamically stable.  Differential diagnosis includes but is not limited to viral gastroenteritis, bacterial gastroenteritis, C. difficile, biliary disease, pancreatitis, recurrent bowel obstruction.  Laboratory evaluation shows mildly low sodium of 133, bicarb 17, remainder of electrolytes are within normal limits with exception of an elevated  "glucose of 367.  LFTs within normal limits.  Lipase is mildly elevated at 73.  He was given Zofran, oxycodone.  Ultrasound of the right upper quadrant shows no evidence of acute cholecystitis.  Abd CT without evidence of bowel obstruction, significant colitis or pancreatitis.  He was able to orally hydrate.  Recheck of his glucose is down to 167.  Low suspicion for DKA.  On reassessment he is feeling improved.  He was unable to provide a stool sample while in the ED and was provided with a collection kit for home.  He was given close return precautions and voiced understanding.    I have reviewed the nursing notes. I have reviewed the findings, diagnosis, plan and need for follow up with the patient.    New Prescriptions    No medications on file       Final diagnoses:   Generalized abdominal pain   Diarrhea, unspecified type   I, Mirtha Campos, am serving as a trained medical scribe to document services personally performed by Sharlene Tobin MD, based on the provider's statements to me.     I, Sharlene Tobin MD, was physically present and have reviewed and verified the accuracy of this note documented by Mirtha Campos.     Sharlene Sandoval MD   [facility] EMERGENCY DEPARTMENT  10/21/2024     Sharlene Tobin MD  10/21/24 1707    "

## 2024-10-21 NOTE — DISCHARGE INSTRUCTIONS
Please make an appointment to follow up with Your Primary Care Provider in 3-5 days.  If you have any worsening symptoms including increased pain, intractable vomiting, fevers or other concerns return to the emergency department.  Continue to drink small frequent amounts of electrolyte containing fluids throughout the day to stay well-hydrated.

## 2024-10-21 NOTE — ED TRIAGE NOTES
Arrives ambulatory with abdominal pain and diarrhea. Per patient it started around 9pm last night. Having 3 episodes of diarrhea a day.     HX SBO      Triage Assessment (Adult)       Row Name 10/21/24 1025          Triage Assessment    Airway WDL WDL        Respiratory WDL    Respiratory WDL WDL        Skin Circulation/Temperature WDL    Skin Circulation/Temperature WDL WDL        Cardiac WDL    Cardiac WDL X;rhythm     Pulse Rate & Regularity tachycardic        Peripheral/Neurovascular WDL    Peripheral Neurovascular WDL WDL        Cognitive/Neuro/Behavioral WDL    Cognitive/Neuro/Behavioral WDL WDL

## 2024-10-22 ENCOUNTER — HOSPITAL ENCOUNTER (INPATIENT)
Facility: CLINIC | Age: 59
LOS: 3 days | Discharge: HOME | End: 2024-10-26
Attending: STUDENT IN AN ORGANIZED HEALTH CARE EDUCATION/TRAINING PROGRAM | Admitting: STUDENT IN AN ORGANIZED HEALTH CARE EDUCATION/TRAINING PROGRAM
Payer: COMMERCIAL

## 2024-10-22 ENCOUNTER — APPOINTMENT (OUTPATIENT)
Dept: CT IMAGING | Facility: CLINIC | Age: 59
End: 2024-10-22
Attending: STUDENT IN AN ORGANIZED HEALTH CARE EDUCATION/TRAINING PROGRAM
Payer: COMMERCIAL

## 2024-10-22 DIAGNOSIS — K31.1 GASTRIC OUTLET OBSTRUCTION: ICD-10-CM

## 2024-10-22 DIAGNOSIS — K21.00 GASTROESOPHAGEAL REFLUX DISEASE WITH ESOPHAGITIS, UNSPECIFIED WHETHER HEMORRHAGE: ICD-10-CM

## 2024-10-22 DIAGNOSIS — Z11.52 ENCOUNTER FOR SCREENING FOR COVID-19: ICD-10-CM

## 2024-10-22 DIAGNOSIS — Z79.4 TYPE 2 DIABETES MELLITUS WITH OTHER SPECIFIED COMPLICATION, WITH LONG-TERM CURRENT USE OF INSULIN (H): ICD-10-CM

## 2024-10-22 DIAGNOSIS — K59.01 SLOW TRANSIT CONSTIPATION: Primary | ICD-10-CM

## 2024-10-22 DIAGNOSIS — E11.69 TYPE 2 DIABETES MELLITUS WITH OTHER SPECIFIED COMPLICATION, WITH LONG-TERM CURRENT USE OF INSULIN (H): ICD-10-CM

## 2024-10-22 LAB
ALBUMIN SERPL BCG-MCNC: 3.7 G/DL (ref 3.5–5.2)
ALP SERPL-CCNC: 156 U/L (ref 40–150)
ALT SERPL W P-5'-P-CCNC: 18 U/L (ref 0–70)
ANION GAP SERPL CALCULATED.3IONS-SCNC: 12 MMOL/L (ref 7–15)
AST SERPL W P-5'-P-CCNC: 19 U/L (ref 0–45)
BASOPHILS # BLD AUTO: 0.1 10E3/UL (ref 0–0.2)
BASOPHILS NFR BLD AUTO: 1 %
BILIRUB SERPL-MCNC: 0.3 MG/DL
BUN SERPL-MCNC: 9.8 MG/DL (ref 8–23)
CALCIUM SERPL-MCNC: 9.2 MG/DL (ref 8.8–10.4)
CHLORIDE SERPL-SCNC: 99 MMOL/L (ref 98–107)
CREAT SERPL-MCNC: 0.65 MG/DL (ref 0.67–1.17)
EGFRCR SERPLBLD CKD-EPI 2021: >90 ML/MIN/1.73M2
EOSINOPHIL # BLD AUTO: 0.2 10E3/UL (ref 0–0.7)
EOSINOPHIL NFR BLD AUTO: 2 %
ERYTHROCYTE [DISTWIDTH] IN BLOOD BY AUTOMATED COUNT: 12.9 % (ref 10–15)
GLUCOSE SERPL-MCNC: 410 MG/DL (ref 70–99)
HCO3 SERPL-SCNC: 23 MMOL/L (ref 22–29)
HCT VFR BLD AUTO: 37 % (ref 40–53)
HGB BLD-MCNC: 12.5 G/DL (ref 13.3–17.7)
IMM GRANULOCYTES # BLD: 0 10E3/UL
IMM GRANULOCYTES NFR BLD: 1 %
INR PPP: 0.93 (ref 0.85–1.15)
LACTATE SERPL-SCNC: 2.2 MMOL/L (ref 0.7–2)
LYMPHOCYTES # BLD AUTO: 2.9 10E3/UL (ref 0.8–5.3)
LYMPHOCYTES NFR BLD AUTO: 39 %
MCH RBC QN AUTO: 29.8 PG (ref 26.5–33)
MCHC RBC AUTO-ENTMCNC: 33.8 G/DL (ref 31.5–36.5)
MCV RBC AUTO: 88 FL (ref 78–100)
MONOCYTES # BLD AUTO: 0.5 10E3/UL (ref 0–1.3)
MONOCYTES NFR BLD AUTO: 7 %
NEUTROPHILS # BLD AUTO: 3.8 10E3/UL (ref 1.6–8.3)
NEUTROPHILS NFR BLD AUTO: 50 %
NRBC # BLD AUTO: 0 10E3/UL
NRBC BLD AUTO-RTO: 0 /100
PLATELET # BLD AUTO: 383 10E3/UL (ref 150–450)
POTASSIUM SERPL-SCNC: 3.7 MMOL/L (ref 3.4–5.3)
PROT SERPL-MCNC: 6.9 G/DL (ref 6.4–8.3)
RBC # BLD AUTO: 4.19 10E6/UL (ref 4.4–5.9)
SODIUM SERPL-SCNC: 134 MMOL/L (ref 135–145)
WBC # BLD AUTO: 7.5 10E3/UL (ref 4–11)

## 2024-10-22 PROCEDURE — 96374 THER/PROPH/DIAG INJ IV PUSH: CPT | Performed by: STUDENT IN AN ORGANIZED HEALTH CARE EDUCATION/TRAINING PROGRAM

## 2024-10-22 PROCEDURE — 74177 CT ABD & PELVIS W/CONTRAST: CPT

## 2024-10-22 PROCEDURE — 99285 EMERGENCY DEPT VISIT HI MDM: CPT | Mod: 25 | Performed by: STUDENT IN AN ORGANIZED HEALTH CARE EDUCATION/TRAINING PROGRAM

## 2024-10-22 PROCEDURE — 85610 PROTHROMBIN TIME: CPT | Performed by: STUDENT IN AN ORGANIZED HEALTH CARE EDUCATION/TRAINING PROGRAM

## 2024-10-22 PROCEDURE — 85025 COMPLETE CBC W/AUTO DIFF WBC: CPT | Performed by: STUDENT IN AN ORGANIZED HEALTH CARE EDUCATION/TRAINING PROGRAM

## 2024-10-22 PROCEDURE — 74177 CT ABD & PELVIS W/CONTRAST: CPT | Mod: 26 | Performed by: RADIOLOGY

## 2024-10-22 PROCEDURE — 99285 EMERGENCY DEPT VISIT HI MDM: CPT | Performed by: STUDENT IN AN ORGANIZED HEALTH CARE EDUCATION/TRAINING PROGRAM

## 2024-10-22 PROCEDURE — 250N000009 HC RX 250: Performed by: STUDENT IN AN ORGANIZED HEALTH CARE EDUCATION/TRAINING PROGRAM

## 2024-10-22 PROCEDURE — 83605 ASSAY OF LACTIC ACID: CPT | Performed by: STUDENT IN AN ORGANIZED HEALTH CARE EDUCATION/TRAINING PROGRAM

## 2024-10-22 PROCEDURE — 250N000011 HC RX IP 250 OP 636: Performed by: STUDENT IN AN ORGANIZED HEALTH CARE EDUCATION/TRAINING PROGRAM

## 2024-10-22 PROCEDURE — 80053 COMPREHEN METABOLIC PANEL: CPT | Performed by: STUDENT IN AN ORGANIZED HEALTH CARE EDUCATION/TRAINING PROGRAM

## 2024-10-22 PROCEDURE — 250N000013 HC RX MED GY IP 250 OP 250 PS 637: Performed by: STUDENT IN AN ORGANIZED HEALTH CARE EDUCATION/TRAINING PROGRAM

## 2024-10-22 PROCEDURE — 36415 COLL VENOUS BLD VENIPUNCTURE: CPT | Performed by: STUDENT IN AN ORGANIZED HEALTH CARE EDUCATION/TRAINING PROGRAM

## 2024-10-22 RX ORDER — OLANZAPINE 5 MG/1
5 TABLET, ORALLY DISINTEGRATING ORAL ONCE
Status: COMPLETED | OUTPATIENT
Start: 2024-10-22 | End: 2024-10-22

## 2024-10-22 RX ORDER — IOPAMIDOL 755 MG/ML
92 INJECTION, SOLUTION INTRAVASCULAR ONCE
Status: COMPLETED | OUTPATIENT
Start: 2024-10-22 | End: 2024-10-22

## 2024-10-22 RX ADMIN — OLANZAPINE 5 MG: 5 TABLET, ORALLY DISINTEGRATING ORAL at 22:54

## 2024-10-22 RX ADMIN — PANTOPRAZOLE SODIUM 80 MG: 40 INJECTION, POWDER, FOR SOLUTION INTRAVENOUS at 22:55

## 2024-10-22 RX ADMIN — IOPAMIDOL 92 ML: 755 INJECTION, SOLUTION INTRAVENOUS at 23:56

## 2024-10-22 ASSESSMENT — ACTIVITIES OF DAILY LIVING (ADL): ADLS_ACUITY_SCORE: 37

## 2024-10-23 ENCOUNTER — APPOINTMENT (OUTPATIENT)
Dept: GENERAL RADIOLOGY | Facility: CLINIC | Age: 59
End: 2024-10-23
Attending: STUDENT IN AN ORGANIZED HEALTH CARE EDUCATION/TRAINING PROGRAM
Payer: COMMERCIAL

## 2024-10-23 LAB
ANION GAP SERPL CALCULATED.3IONS-SCNC: 12 MMOL/L (ref 7–15)
BUN SERPL-MCNC: 7.3 MG/DL (ref 8–23)
CALCIUM SERPL-MCNC: 8.8 MG/DL (ref 8.8–10.4)
CHLORIDE SERPL-SCNC: 101 MMOL/L (ref 98–107)
CREAT SERPL-MCNC: 0.65 MG/DL (ref 0.67–1.17)
EGFRCR SERPLBLD CKD-EPI 2021: >90 ML/MIN/1.73M2
ERYTHROCYTE [DISTWIDTH] IN BLOOD BY AUTOMATED COUNT: 12.9 % (ref 10–15)
EST. AVERAGE GLUCOSE BLD GHB EST-MCNC: 255 MG/DL
GLUCOSE BLDC GLUCOMTR-MCNC: 125 MG/DL (ref 70–99)
GLUCOSE BLDC GLUCOMTR-MCNC: 246 MG/DL (ref 70–99)
GLUCOSE BLDC GLUCOMTR-MCNC: 403 MG/DL (ref 70–99)
GLUCOSE BLDC GLUCOMTR-MCNC: 66 MG/DL (ref 70–99)
GLUCOSE BLDC GLUCOMTR-MCNC: 85 MG/DL (ref 70–99)
GLUCOSE BLDC GLUCOMTR-MCNC: 92 MG/DL (ref 70–99)
GLUCOSE SERPL-MCNC: 440 MG/DL (ref 70–99)
HBA1C MFR BLD: 10.5 %
HCO3 SERPL-SCNC: 20 MMOL/L (ref 22–29)
HCT VFR BLD AUTO: 36.5 % (ref 40–53)
HGB BLD-MCNC: 12.1 G/DL (ref 13.3–17.7)
HOLD SPECIMEN: NORMAL
LACTATE SERPL-SCNC: 1.8 MMOL/L (ref 0.7–2)
MCH RBC QN AUTO: 29.2 PG (ref 26.5–33)
MCHC RBC AUTO-ENTMCNC: 33.2 G/DL (ref 31.5–36.5)
MCV RBC AUTO: 88 FL (ref 78–100)
PLATELET # BLD AUTO: 327 10E3/UL (ref 150–450)
POTASSIUM SERPL-SCNC: 3.6 MMOL/L (ref 3.4–5.3)
RBC # BLD AUTO: 4.14 10E6/UL (ref 4.4–5.9)
SARS-COV-2 RNA RESP QL NAA+PROBE: NEGATIVE
SODIUM SERPL-SCNC: 133 MMOL/L (ref 135–145)
WBC # BLD AUTO: 5.8 10E3/UL (ref 4–11)

## 2024-10-23 PROCEDURE — 87635 SARS-COV-2 COVID-19 AMP PRB: CPT

## 2024-10-23 PROCEDURE — 999N000065 XR ABDOMEN PORT 1 VIEW

## 2024-10-23 PROCEDURE — 258N000001 HC RX 258

## 2024-10-23 PROCEDURE — 99222 1ST HOSP IP/OBS MODERATE 55: CPT | Mod: GC | Performed by: STUDENT IN AN ORGANIZED HEALTH CARE EDUCATION/TRAINING PROGRAM

## 2024-10-23 PROCEDURE — 250N000011 HC RX IP 250 OP 636

## 2024-10-23 PROCEDURE — 80048 BASIC METABOLIC PNL TOTAL CA: CPT

## 2024-10-23 PROCEDURE — 120N000002 HC R&B MED SURG/OB UMMC

## 2024-10-23 PROCEDURE — 85027 COMPLETE CBC AUTOMATED: CPT

## 2024-10-23 PROCEDURE — 74018 RADEX ABDOMEN 1 VIEW: CPT | Mod: 26 | Performed by: RADIOLOGY

## 2024-10-23 PROCEDURE — 36415 COLL VENOUS BLD VENIPUNCTURE: CPT | Performed by: STUDENT IN AN ORGANIZED HEALTH CARE EDUCATION/TRAINING PROGRAM

## 2024-10-23 PROCEDURE — 80359 METHYLENEDIOXYAMPHETAMINES: CPT

## 2024-10-23 PROCEDURE — 83036 HEMOGLOBIN GLYCOSYLATED A1C: CPT

## 2024-10-23 PROCEDURE — 258N000003 HC RX IP 258 OP 636

## 2024-10-23 PROCEDURE — 83605 ASSAY OF LACTIC ACID: CPT | Performed by: STUDENT IN AN ORGANIZED HEALTH CARE EDUCATION/TRAINING PROGRAM

## 2024-10-23 PROCEDURE — 250N000012 HC RX MED GY IP 250 OP 636 PS 637

## 2024-10-23 PROCEDURE — 99233 SBSQ HOSP IP/OBS HIGH 50: CPT

## 2024-10-23 PROCEDURE — 250N000013 HC RX MED GY IP 250 OP 250 PS 637

## 2024-10-23 PROCEDURE — 36415 COLL VENOUS BLD VENIPUNCTURE: CPT

## 2024-10-23 PROCEDURE — 82962 GLUCOSE BLOOD TEST: CPT

## 2024-10-23 PROCEDURE — 80307 DRUG TEST PRSMV CHEM ANLYZR: CPT

## 2024-10-23 RX ORDER — ENOXAPARIN SODIUM 100 MG/ML
40 INJECTION SUBCUTANEOUS EVERY 24 HOURS
Status: DISCONTINUED | OUTPATIENT
Start: 2024-10-23 | End: 2024-10-26 | Stop reason: HOSPADM

## 2024-10-23 RX ORDER — SODIUM CHLORIDE 9 MG/ML
INJECTION, SOLUTION INTRAVENOUS CONTINUOUS
Status: DISCONTINUED | OUTPATIENT
Start: 2024-10-23 | End: 2024-10-24

## 2024-10-23 RX ORDER — GLIPIZIDE 10 MG/1
10 TABLET, FILM COATED, EXTENDED RELEASE ORAL DAILY
Status: CANCELLED | OUTPATIENT
Start: 2024-10-23

## 2024-10-23 RX ORDER — PANTOPRAZOLE SODIUM 40 MG/1
40 TABLET, DELAYED RELEASE ORAL
Status: DISCONTINUED | OUTPATIENT
Start: 2024-10-24 | End: 2024-10-26 | Stop reason: HOSPADM

## 2024-10-23 RX ORDER — DEXTROSE MONOHYDRATE 25 G/50ML
25-50 INJECTION, SOLUTION INTRAVENOUS
Status: DISCONTINUED | OUTPATIENT
Start: 2024-10-23 | End: 2024-10-26 | Stop reason: HOSPADM

## 2024-10-23 RX ORDER — NICOTINE POLACRILEX 4 MG
15-30 LOZENGE BUCCAL
Status: DISCONTINUED | OUTPATIENT
Start: 2024-10-23 | End: 2024-10-26 | Stop reason: HOSPADM

## 2024-10-23 RX ORDER — LIDOCAINE 40 MG/G
CREAM TOPICAL
Status: DISCONTINUED | OUTPATIENT
Start: 2024-10-23 | End: 2024-10-26 | Stop reason: HOSPADM

## 2024-10-23 RX ADMIN — INSULIN GLARGINE 14 UNITS: 100 INJECTION, SOLUTION SUBCUTANEOUS at 08:10

## 2024-10-23 RX ADMIN — SODIUM CHLORIDE: 9 INJECTION, SOLUTION INTRAVENOUS at 16:57

## 2024-10-23 RX ADMIN — DEXTROSE MONOHYDRATE 25 ML: 25 INJECTION, SOLUTION INTRAVENOUS at 14:53

## 2024-10-23 RX ADMIN — ENOXAPARIN SODIUM 40 MG: 40 INJECTION SUBCUTANEOUS at 04:22

## 2024-10-23 RX ADMIN — BICTEGRAVIR SODIUM, EMTRICITABINE, AND TENOFOVIR ALAFENAMIDE FUMARATE 1 TABLET: 50; 200; 25 TABLET ORAL at 08:13

## 2024-10-23 RX ADMIN — INSULIN ASPART 6 UNITS: 100 INJECTION, SOLUTION INTRAVENOUS; SUBCUTANEOUS at 06:25

## 2024-10-23 ASSESSMENT — ACTIVITIES OF DAILY LIVING (ADL)
ADLS_ACUITY_SCORE: 0
ADLS_ACUITY_SCORE: 37
ADLS_ACUITY_SCORE: 0

## 2024-10-23 NOTE — ED PROVIDER NOTES
ED Provider Note  [facility]      History   No chief complaint on file.    HPI  Andrew Collier is a 58 year old male with a history of DM II, multiple bowel obstructions, HIV, and GERD who presents to the ED for evaluation of abdominal pain. The patient reports left-sided abdominal pain that began at 3 PM today. The pain is constant and feels worse than yesterday's abdominal pain. He has had multiple episodes of vomiting but denies having diarrhea. He denies fevers, chills or back pain. The patient last ate at 2 PM today. The patient has been taking his medications as prescribed.     Past Medical History  Past Medical History:   Diagnosis Date    Acute appendicitis with localized peritonitis 1/31/2018    AIDS (H)     Allergic rhinitis due to other allergen     DNS    Anal dysplasia     Chronic abdominal pain     CNS toxoplasmosis (H)     COVID-19 1/11/2022    Diabetes type 2, controlled (H)     GERD (gastroesophageal reflux disease)     Herpes zoster 9/23/2016    History of seizure     History of substance abuse (H)     HIV (human immunodeficiency virus infection) (H)     HLD (hyperlipidemia)     Lung nodules     Periungual wart     Pneumonia 1/6/2019    PTSD (post-traumatic stress disorder)     Sleep apnea     doesn't use CPAP     Past Surgical History:   Procedure Laterality Date    ANOSCOPY N/A 9/9/2020    Procedure: Exam Under Anesthesia, ANOSCOPY, fulgeration of rectal fissures with Rectal Biopsies;  Surgeon: Thanh Lundberg MD;  Location: [facility] OR    COLONOSCOPY Left 1/22/2016    Procedure: COMBINED COLONOSCOPY, SINGLE OR MULTIPLE BIOPSY/POLYPECTOMY BY BIOPSY;  Surgeon: Clark Saini MD;  Location: [facility] GI    ESOPHAGOSCOPY, GASTROSCOPY, DUODENOSCOPY (EGD), COMBINED N/A 6/6/2022    Procedure: ESOPHAGOGASTRODUODENOSCOPY, WITH BIOPSY;  Surgeon: Kecia Benítez MD;  Location: [facility] GI    ESOPHAGOSCOPY, GASTROSCOPY, DUODENOSCOPY (EGD), COMBINED N/A 10/10/2024    Procedure:  Esophagoscopy, gastroscopy, duodenoscopy (EGD), combined;  Surgeon: Nico Bhatti DO;  Location: [facility] GI    HC EXPLORE UNDESC TESTIS,INGUIN/SCROTAL      LAPAROSCOPIC APPENDECTOMY N/A 1/31/2018    Procedure: LAPAROSCOPIC APPENDECTOMY;  LAPAROSCOPIC APPENDECTOMY;  Surgeon: Dawn Holt MD;  Location: [facility]    LAPAROSCOPY DIAGNOSTIC (GENERAL) N/A 7/26/2016    Procedure: LAPAROSCOPY DIAGNOSTIC (GENERAL);  Surgeon: Susannah Arriaga MD;  Location: [facility]    LAPAROSCOPY DIAGNOSTIC (GENERAL) N/A 4/16/2018    Procedure: LAPAROSCOPY DIAGNOSTIC (GENERAL);  Diagnostic laparoscopy and lysis of adhesions;  Surgeon: Prince Dowling MD;  Location: [facility] OR    OPTICAL TRACKING SYSTEM CRANIOTOMY, EXCISE TUMOR, COMBINED Left 4/10/2015    Procedure: COMBINED OPTICAL TRACKING SYSTEM CRANIOTOMY, EXCISE TUMOR;  Surgeon: Mirlande Colmenares MD;  Location: [facility] OR    REPAIR GAMEKEEPER'S THUMB Right 12/2/2016    Procedure: REPAIR LIGAMENT ULNAR COLLATERAL THUMB (GAMEKEEPER'S);  Surgeon: Evin Zamorano MD;  Location: [facility]    ZZC NONSPECIFIC PROCEDURE      right forearm fracture     bictegravir-emtricitabine-tenofovir (BIKTARVY) -25 MG per tablet  blood glucose (NO BRAND SPECIFIED) lancets standard  blood glucose (NO BRAND SPECIFIED) test strip  blood glucose monitoring (NO BRAND SPECIFIED) meter device kit  Continuous Glucose Sensor (DEXCOM G6 SENSOR) MISC  Continuous Glucose Sensor (DEXCOM G7 SENSOR) MISC  Continuous Glucose Transmitter (DEXCOM G6 TRANSMITTER) MISC  famotidine (PEPCID) 40 MG tablet  glipiZIDE (GLUCOTROL XL) 10 MG 24 hr tablet  insulin glargine (LANTUS PEN) 100 UNIT/ML pen  insulin pen needle (32G X 4 MM) 32G X 4 MM miscellaneous  Nutritional Supplements (ENSURE ACTIVE) LIQD      Allergies   Allergen Reactions    Fentanyl Blisters     Per pt, after taking this medication had blisters develope    Tylenol [Acetaminophen] Itching    Dulaglutide Rash    Insulin Lispro Rash     Patient reported     "Penicillin V Other (See Comments) and Rash     Diffuse maculopapular rash + feels \"high\", per pt.      Family History  Family History   Problem Relation Age of Onset    Diabetes Brother     Diabetes Father     Alzheimer Disease Father     Unknown/Adopted Mother     Diabetes Paternal Grandfather     Cancer No family hx of         no skin cancer    Skin Cancer No family hx of         no famiy hx of skin cancer    Glaucoma No family hx of     Macular Degeneration No family hx of      Social History   Social History     Tobacco Use    Smoking status: Some Days     Current packs/day: 0.25     Average packs/day: 0.3 packs/day for 40.0 years (10.0 ttl pk-yrs)     Types: Cigarettes    Smokeless tobacco: Former   Substance Use Topics    Alcohol use: No     Alcohol/week: 0.0 standard drinks of alcohol     Comment: Last etoh in 2007    Drug use: Not Currently     Types: Marijuana     Comment: chart indicated meth use but pt denies; states he does not use drugs      Past medical history, past surgical history, medications, allergies, family history, and social history were reviewed with the patient. No additional pertinent items.   A medically appropriate review of systems was performed with pertinent positives and negatives noted in the HPI, and all other systems negative.    Physical Exam      Physical Exam  Constitutional:       General: He is not in acute distress.     Appearance: Normal appearance. He is not toxic-appearing.   HENT:      Head: Atraumatic.   Eyes:      General: No scleral icterus.     Conjunctiva/sclera: Conjunctivae normal.   Cardiovascular:      Rate and Rhythm: Normal rate.      Heart sounds: Normal heart sounds.   Pulmonary:      Effort: Pulmonary effort is normal. No respiratory distress.      Breath sounds: Normal breath sounds.   Abdominal:      Palpations: Abdomen is soft.      Tenderness: There is no abdominal tenderness.      Comments: Abdomen soft and nodistended with severe epigastric TTP but " nontender otherwise   Musculoskeletal:         General: No deformity.      Cervical back: Neck supple.   Skin:     General: Skin is warm.   Neurological:      Mental Status: He is alert.         ED Course, Procedures, & Data     ED Course as of 10/23/24 0246   Wed Oct 23, 2024   0034 Labs as reviewed and interpreted by me are significant for a lactate of 2.2     Procedures                No results found for any visits on 10/22/24.  Medications - No data to display  Labs Ordered and Resulted from Time of ED Arrival to Time of ED Departure - No data to display  No orders to display          Critical care was not performed.     Medical Decision Making  The patient's presentation was of high complexity (an acute health issue posing potential threat to life or bodily function).    The patient's evaluation involved:  review of external note(s) from 3+ sources (see separate area of note for details)  review of 3+ test result(s) ordered prior to this encounter (see separate area of note for details)  ordering and/or review of 3+ test(s) in this encounter (see separate area of note for details)  discussion of management or test interpretation with another health professional (GI, hospitalist)    The patient's management necessitated high risk (a decision regarding hospitalization).    Assessment & Plan    Here in the ER patient's workup significant for labs with an elevated lactate which came down as well as a CT scan showing gastric outlet obstruction  Consulted GI who recommended patient have NG tube placed and be admitted to medicine  Spoke with nursing about NG tube  Discussed case with inpatient hospitalist who accepted patient for admission    I have reviewed the nursing notes. I have reviewed the findings, diagnosis, plan and need for follow up with the patient.    New Prescriptions    No medications on file       Final diagnoses:   None       Jacques Mann MD on 10/23/2024 at 2:48 AM   [facility]  EMERGENCY DEPARTMENT  10/22/2024     Jacques Mann MD  10/23/24 [id number]

## 2024-10-23 NOTE — PROGRESS NOTES
[facility]    Medicine Progress Note - Medicine Service, MARZEN TEAM 1    Date of Admission:  10/22/2024    Assessment & Plan   59 y/o M with pmhx of HIV, Esophagitis, poorly controlled DM2, TOMMY, h/o SBO, and difficult social situation presents for LUQ. Has had prior admission (10/7-10/10) with concern for gastric outlet obstruction (GOO) and recurrent small bowel obstruction. Multiple additional ER visits since then for similar symptoms. Similar to prior pains that he has had with SBO in the past. Imaging was negative for SBO, It did show severely dilated stomach and there was concern for GOO however this is felt to be unlikely given prior EGD (10/10/24) not showing GOO. He is admitted for management of this recurrent abd pain and n/v.     Today:  - Continue NG to LIWS  - Increased insulin to HDSSI and increased Lantus from 14 to 20  - NS at 100mL/hr while NPO, will reassess tomorrow  - 40mg PPI BID  - GI Consulted    #Abd pain  #N/v  #Gastroparesis?  #h/o SBO  #Esophagitis  Abdominal pain in LUQ started 10/22 afternoon. CT A/P showed moderate to marked interval fluid-filled distention of the stomach since prior CTs. Lactate elevated 2.2 and on recheck 1.8. Had been admitted earlier in October for similar presentation and was diagnosed with SBO but left AMA prior to any intervention. EGD was performed showing LA Grade B Esophagitis without gastric outlet obstruction. Current highest concern is for gastroparesis with likely contribution from poorly controlled DM, Methamphetamine use, and HIV neuropathy.   - Continue NPO with NG to LIWS until 10/24, will trial clamp at that time   - MIVF with NS while NPO, will not add D5 given uncontrolled diabetes  - PPI 40mg BID  - MR enterography after resumption of bowel function  - Consult RD regarding diabetic and gastroparesis diet once he can eat    #Poorly controlled, insulin dependent, DM2  Home dose of 27u Lantus once a day  "and Glipizide 10mg. Reports that his BS at home is very uncontrolled with values regularly in the 400s. A1c of 11.7% on 7/13/24.   - A1C is 10.5% on admission  - was admitted on MDSSI and 14u of Lantus  - Increased to HDSSI and 20u of Lantus    #Methamphetamine use disorder  Denies methamphetamine use. Reports no use for 3 years. UDS from 10/8/24 was positive for Amphetamines, review of medication list does not immediately show anything that would cause a false positive.   - May consult addiction medicine if further conversation with pt is not fruitful    #HIV  Most recent CD4 423 (10/10/24). Follows with Dr. Ivory last seen 7/2024.  - Continue Biktarvy    #Social Determinants of Health  Appears to have poor medication adherence/availability despite pt report. Currently staying at a shelter without stable housing. Does not have a phone.   - SW consulted          Diet: NPO for Medical/Clinical Reasons Except for: Meds    DVT Prophylaxis: Enoxaparin (Lovenox) SQ  Potts Catheter: Not present  Lines: 1 PIV    Cardiac Monitoring: None  Code Status: Full Code      Clinically Significant Risk Factors Present on Admission         # Hyponatremia: Lowest Na = 133 mmol/L in last 2 days, will monitor as appropriate                  # DMII: A1C = 10.5 % (Ref range: <5.7 %) within past 6 months   # Overweight: Estimated body mass index is 26.61 kg/m  as calculated from the following:    Height as of this encounter: 1.626 m (5' 4\").    Weight as of this encounter: 70.3 kg (155 lb).         # Financial/Environmental Concerns:    # Support System: poor social support noted in nursing assessment  # Housing Instability: noted in nursing assessment         Disposition Plan     Medically Ready for Discharge: Anticipated in 2-4 Days       The patient's care was discussed with the Attending Physician, Dr. Tariq .    Lam Huber MD  Medicine Service, [facility] TEAM [address] [place] [address]  Securely " message with [employer] (more info)  Text page via [facility] Paging/Directory   See signed in provider for up to date coverage information  ______________________________________________________________________    Interval History   Pt reports that these sx of severe abd distension with pain, nausea, and vomiting occur soon after eating, if he has not eaten he does not develop sx. Currently feeling much better after some time with NG tube. Not having any abd pain at this time, not feeling nauseous. Last BM was 2 days ago and was loose, last passed gas this morning, last episode of emesis was yesterday.    Physical Exam   Vital Signs: Temp: 98.7  F (37.1  C) Temp src: Oral BP: (!) 136/93 Pulse: 69   Resp: 18 SpO2: 100 % O2 Device: None (Room air)    Weight: 155 lbs 0 oz    General: Alert. Laying in bed comfortably. NAD. Well developed and nourished.  HEENT: NC, AT, MMM, EOMI, Clear conjunctivae, normal oropharynx  Cardiovascular: RRR, No murmurs, rubs, or gallops. Normal S1 and S2.  Pulmonary: CTAB, No wheezes/rhonchi/rales, Normal inspiratory effort  Abdominal: Soft, non-distended, non-tender. Normal bowel sounds.  Extremities: No swelling or erythema. FAROM.   Skin: Clean, warm, dry, intact  Neuro: A&Ox4, Speech is normal, CN not tested but appear grossly intact. Moving all extremities.     Medical Decision Making           Data     I have personally reviewed the following data over the past 24 hrs:    5.8  \   12.1 (L)   / 327     133 (L) 101 7.3 (L) /  125 (H)   3.6 20 (L) 0.65 (L) \     ALT: 18 AST: 19 AP: 156 (H) TBILI: 0.3   ALB: 3.7 TOT PROTEIN: 6.9 LIPASE: N/A     TSH: N/A T4: N/A A1C: 10.5 (H)     Procal: N/A CRP: N/A Lactic Acid: 1.8       INR:  0.93 PTT:  N/A   D-dimer:  N/A Fibrinogen:  N/A       Imaging results reviewed over the past 24 hrs:   Recent Results (from the past 24 hours)   CT Abdomen Pelvis w Contrast    Narrative    EXAM: CT ABDOMEN PELVIS W CONTRAST  LOCATION: [facility]  DATE: 10/23/2024    INDICATION: abd pain  COMPARISON: 10/21/2024  TECHNIQUE: CT scan of the abdomen and pelvis was performed following injection of IV contrast. Multiplanar reformats were obtained. Dose reduction techniques were used.  CONTRAST: 92ml isovue 370    FINDINGS:   LOWER CHEST: Dependent atelectasis in the posterior lung bases, greater on the left.    HEPATOBILIARY: Gallbladder is nondistended and contains a calcified gallstone. No significant mass or bile duct dilatation.     PANCREAS: No significant mass, duct dilatation, or inflammatory change.    SPLEEN: Normal.    ADRENAL GLANDS: Normal.    KIDNEYS/BLADDER: The bilateral kidneys enhance symmetrically without evidence for hydronephrosis or pyelonephritis. No renal masses or calculi noted. The bilateral ureters and urinary bladder are unremarkable.    BOWEL: Moderate to marked interval fluid-filled distention of the stomach. Clinical correlation for gastric outlet obstruction symptoms. Nonspecific bowel gas pattern otherwise. Appendix not seen.    LYMPH NODES: No lymphadenopathy.    VASCULATURE: No abdominal aortic aneurysm. Mild vascular calcifications abdominal aorta and common iliac arteries.    PELVIC ORGANS: Penile implant with reservoir left lower pelvis anteriorly. No pelvic free fluid.    MUSCULOSKELETAL: No concerning osseous abnormalities. No inguinal hernias. Small periumbilical ventral hernia.      Impression    IMPRESSION:   1.  Moderate to marked interval fluid-filled distention of the stomach since prior exam. Clinical correlation for gastric outlet obstruction symptoms.  2.  Cholelithiasis without gallbladder distention. No bile duct dilatation.  3.  Dependent atelectasis in the posterior lung bases, greater on the left.  4.  Penile implant with reservoir left lower pelvis anteriorly.  5.  Small periumbilical ventral hernia.     XR Abdomen Port 1 View    Narrative    EXAM: ABDOMEN SINGLE VIEW  PORTABLE  LOCATION: [facility]  DATE/TIME: 10/23/2024 2:55 AM CDT    INDICATION: Tube placement.  COMPARISON: 11/10/2024.      Impression    IMPRESSION: An enteric drainage tube is present with the distal tip in the gastric body.

## 2024-10-23 NOTE — PROGRESS NOTES
Paged Dr. Lam Huber via [employer] - informed stating D50 per PRN orders. Provider ok with plan and will adjust insulin.

## 2024-10-23 NOTE — ED TRIAGE NOTES
Pt ambulatory to triage c/o with 10/10 abd pain, heartburn, n/v- pt stated he has vomited many times today. Denies taking anything for the pain. Pt states he lives in a shelter   Started around 3pm

## 2024-10-23 NOTE — CONSULTS
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, disseminated varicella, anal lesion with +HSV stain, abscess of right buttock status-post incision and drainage 11/18/2022, poorly controlled type 2 diabetes (last Hemoglobin A1C 11.7% on 7/13/2024), hypertension, major depressive disorder, generalized anxiety disorder, methamphetamine abuse, history of appendectomy 1/31/2018, recurrent small bowel obstructions, homelessness who was recently admitted 9/28-10/1/2024 for partial small bowel obstruction with generalized abdominal pain during which time patient left AMA, who re-presented to the ED 10/7/2024 for abdominal pain, nausea, vomiting with concern for small bowel obstruction and gastric outlet obstruction status-post EGD 10/10/2024 without gastric outlet obstruction and only LA Grade B Esophagitis, patient left AMA on 10/10/73701 immediately after the procedure and re-presented the same day stable so discharged from the ED with multiple ED visits since for abdominal pain who presented to the ED on 10/22/2024 with reported abdominal pain, heartburn, nausea with emesis, loose stools.     The GI Luminal Service was consulted regarding: \"Assessment and management of possible gastric outlet obstruction.\"       # LA Grade B Esophagitis 10/10/2024  # Poorly Controlled Type 2 Diabetes, Hemoglobin A1C 10.5% 10/23/2024  Recent admission for concern for gastric outlet obstruction in the setting of fluid distension of the stomach on CT scan with normal EGD (no gastric outlet obstruction) and recurrent small bowel " obstruction on CT imaging; patient left AMA 10/10/2024, representing to the ED later that day and was stable and discharged. Recurrent ED visits for reported abdominal pain.   - EGD 10/10/2024 performed for concern for gastric outlet obstruction on CT at that time with normal examined duodenum, normal stomach, non-bleeding LA Grade B esophagitis with no bleeding. Recommended starting Omeprazole 40 mg PO BID and continuing the medication indefinitely.   - Of note, patient with concern for gastric outlet obstruction on CT scan in 2022 with normal EGD at that time as well (normal stomach, normal duodenum) other than LA Grade B Esophagitis.     Presents again with abdominal pain and reported loose stools, found to have moderate to marked interval fluid-filled distension of the stomach on CT scan 10/22/2024 in the absence of vomiting. An NGT was placed overnight to LIS but suction was found to be malfunctioning and just fixed this morning. Denies nausea this morning.     Patient likely has delayed gastric emptying in the setting of chronic methamphetamine abuse and poorly controlled Type 2 diabetes. Recommend continuing NGT to LIS today. If patient doing well tomorrow, consider clamping trial tomorrow morning. Recommend strict blood glucose control, correct electrolyte abnormalities, and RD consult for education regarding diabetic + gastroparesis diet.       # Methamphetamine Abuse  On 10/8/2024 during previous admission, patient denied prior to admission amphetamine use. 10/8/2024 Urine Drug Screen POSITIVE for Amphetamines. Patient this admission 10/23/2024 reports no methamphetamine use in 3 years. Again will recheck UDS as illicit substance abuse may be contributing to symptoms. Methamphetamines intoxication versus withdrawal could lead to non-specific symptoms of nausea with emesis and abdominal pain as well. Consider Addiction Medicine consult.       # Recurrent Small Bowel Obstructions   Long-standing history of  recurrent small bowel obstructions dating back to 2016 prior to abdominal surgeries (?opportunistic infections in the setting of HIV potentially leading to abdominal inflammation and scarring preceding surgeries?) with ex-lap not revealing a transition point nor any inflammation on previous ex-laps nor GI endoscopes. Once patient has restored antegrade bowel function, recommend proceeding with MR Enterography for further evaluation. If any abnormalities, please page the GI Luminal Service (listed on [facility]).        Recommendations:  -- No indication for acute GI endoscopic intervention.   -- Continue NPO status with NGT to LIS today.   - If patient continues without nausea or emesis tomorrow 10/24, recommend clamping trial tomorrow.   - NGT Clamping trial 10/24: Clamp NGT for 2 hours --> If no nausea, trial of clear liquids (no red) for 1 hour --> If all tolerated, remove NGT.  -- Monitor and optimize electrolytes.   -- Continue PPI 40 mg BID.   -- Strict blood glucose control.   -- Discontinue illicit drug use including methamphetamines.   - Addiction Medicine consult for chronic methamphetamine abuse likely contributing to recurrent symptoms with recurrent admissions.   -- Once antegrade bowel function is restored and NGT removed, recommend MR Enterography to assess for any small bowel lesions that could be contributing to long-standing history of recurrent small bowel obstructions.   -- Strict documentation of rectal output.  - Appreciate detailed documentation of stool appearance, including color, consistency, frequency and amount.   - Consider smearing stool thinly on white paper towel to distinguish color.   -- RD consult for education regarding gastroparesis + diabetic diet in the setting of poorly controlled Type 2 diabetes.   -- Social Work +/- Case Management consult for food insecurity, ?medication non-adherence with poorly controlled diabetes, ?additional resources for patient who reports being homeless.    -- Analgesia/Antiemetics per primary team.      COVID status: NEGATIVE 10/23/2024.     Discussed with Dr. Josué Tariq - staff with [facility].     The inpatient gastroenterology service will sign off at this time. Thank you for allowing us to participate in the care of this patient. Please do not hesitate to page the GI service with any questions or concerns.     The patient was discussed and plan agreed upon with Dr. Marcus Nunez, GI Luminal Service staff physician.    Overall time spent on the date of this encounter preparing to see the patient (including chart review of available notes, clinical status events, imaging and labs); discussing, ordering, coordinating recommended medications, tests or procedures; communicating with other health care professionals; and documenting the above clinical information in the electronic medical record was 90 minutes.    Caitie Del Cid PA-C  Inpatient Gastroenterology Service  [facility]           History of Present Illness:   Patient seen and examined at 0945. History is obtained from patient and chart review.    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, disseminated varicella, anal lesion with +HSV stain, abscess of right buttock status-post incision and drainage 11/18/2022, poorly controlled type 2 diabetes (last Hemoglobin A1C 11.7% on 7/13/2024), hypertension, major depressive disorder, generalized anxiety disorder, methamphetamine abuse, history of appendectomy 1/31/2018, recurrent small bowel obstructions, homelessness who was recently admitted 9/28-10/1/2024 for partial small bowel obstruction with generalized abdominal pain during which time patient left AMA, who re-presented to the ED 10/7/2024 for abdominal pain, nausea, vomiting with concern for small bowel obstruction and gastric outlet  "obstruction status-post EGD 10/10/2024 without gastric outlet obstruction and only LA Grade B Esophagitis, patient left AMA on 10/10/65725 immediately after the procedure and re-presented the same day stable so discharged from the ED with multiple ED visits since for abdominal pain who presented to the ED on 10/22/2024 with reported abdominal pain, heartburn, nausea with emesis, loose stools.     The GI Luminal Service was consulted regarding: \"Assessment and management of possible gastric outlet obstruction.\"       Reports 3 days of nausea with emesis believing this is a recurrent small bowel obstruction.     No vomiting since yesterday.     Last bowel movement 2-3 days ago.     NGT to LIS with reportedly resolution of nausea.     Minimal left lumbar abdominal region pain, reportedly improved.     Discussed assessment and plan as above. Patient amenable.     Patient reports last amphetamine use was 3 years ago.     ______________________________________________________    Of note, previous progress note from 10/7/2018 by GI staff Dr. Yael Molina outlines extensive assessment at that time regarding recurrent small bowel obstructions:     Brief summary of prior CT scans since [abdominal] pain began:  1/1/16 CT showed kidney stone, no mention of bowel obstruction  1/3/16 CT showed questionable bowel obstruction based on dilation  7/21/16 CT showed low-grade small bowel obstruction in mid left abdomen  7/24/16 CT showed persistent dilated small bowel in mid abdomen   --> that admission, patient had ex lap with running of the bowel from Ligament of Treitz on. This showed only mild erythema and dilation of one loop of bowel though NO inflammation nor evidence of IBD. No obstruction was.  8/25/16 CT showed stable focal dilated loop of small bowel and severe gastric distention (though SB dilation improved since Aug)  11/6/17 CT showed circumferential thickening of the pylorus and single dilated loop of small bowel " concerning for partial obstruction.     --> Patient had EGD and colonoscopy that did not show evidence of IBD.  1/31/18 abdominal CT showed acute appendicitis    --> Patient had appendectomy  2/4/18 CT showed abscess in the right lower quadrant    --> s/p right percutaneous drain placement  2/10/18 abdominal CT showed right percutaneous drain with decreased size of abdominal abscess (no bowel dilations noted)  4/16/18 abdominal CT showed small bowel obstruction with 2 possible transition point with potential close loops of obstruction   --> s/p exploratory laparoscopy with lysis of adhesions, SB examined from TI to ligament of Treitz x2, some areas of dilation of both SB and large bowel though no inflammation and no transition point  4/20/18 abdominal CTs showed postsurgical changes and resolution of small bowel obstruction  7/4/18 abdominal CT showed small bowel obstruction with transition point in the right lower quadrant  9/10/18 abdominal CT showed loops of mildly dilated small bowel in the central abdomen and gentle transition point in the right mid abdomen  9/27/18 there was no bowel obstruction seen on CT but there was a gastric wall thickening concerning for gastritis     Concern for SB obstructions prior to any surgery starting in 2016. Imaging until 4/2018 only revealed areas of bowel dilation/distention.  Transition points not seen until patient had undergone a few surgeries. Notably ex lap done after one of these scans these did not reveal a transition point.     ? If always recurrent SBO - and if so, what was the  of these before surgical adhesions. ?small bowel intraluminal lesion  Vs.  ?Intermittent enteritis with dilation in setting of adhesive disease  Vs.  ?Pt with SB dilation on imaging more suggestive of underlying dysmotility disorder?  Though picture further complicated by multiple surgeries and appendicitis with post-op abscess which has led to adhesive disease.      Given chronic pain,  unclear to what degree pain is attributable to altered pain pathways. May be an avenue that can be pursued once acute issues are addressed.            No evidence at this time to suggest Crohn's disease (negative EGD and colonoscopy, negative ex lap with running of the bowel x 2, appendix pathology was consistent with acute appendicitis).   May be useful to further assess small bowel for intraluminal lesions- not ideal capsule candidate (?patency capsule), though would like target if pursuing deep bowel enteroscopy. Will review imaging with radiology.      Agree with evaluation for infectious causes of enteritis.     Notably has baseline, pronounced constipation which is, at least in part, diet-related, unclear if component of dysmotility from currently available data. This should be more aggressively managed and may improve overall symptoms.  Pt also with bloating, improves with flatus and worsened with ingestion of high gas foods. Will attempt to address this as below in addition to optimizing bowel pattern.     ________________________________________________________        Previous GI Endoscopic Procedures:    10/10/2024 - EGD - Dr. Preet Bhatti   Indications:           nausea/vomiting, abnormal findings in CT scan, c/f                          gastric outlet obstruction.   Impression:            - Normal examined duodenum.                          - Normal stomach.                          - Gastroesophageal flap valve classified as Hill Grade                          II (fold present, opens with respiration).                          - LA Grade B esophagitis with no bleeding.                          - No specimens collected.       6/6/2022 - EGD - Dr. Kecia Benítez  Indications:           Suspected gastric outlet obstruction   Patient Profile:       This is a 54 year old male with a PMH of HIV                          infection, recurrent small bowel obstruction is seen                          by GI for an  EGD to exclude GOO.   Findings:       LA Grade B (one or more mucosal breaks greater than 5 mm, not extending        between the tops of two mucosal folds) esophagitis with no bleeding was        found.        A small hiatal hernia was present.        There is no endoscopic evidence of bleeding, erythema or inflammatory        changes suggestive of gastritis, mucosal abnormalities or ulceration in        the entire examined stomach.        The examined duodenum was normal. Biopsies were taken with a cold        forceps for histology.                                                                                    Moderate Sedation:        Moderate (conscious) sedation was administered by the endoscopy nurse        and supervised by the endoscopist. The patient's oxygen saturation,        heart rate, blood pressure and response to care were monitored. Total        physician intraservice time was 18 minutes.   Impression:            - LA Grade B reflux esophagitis with no bleeding.                          - Small hiatal hernia.                          - Normal examined duodenum. Biopsied.           Final Diagnosis   A.  DUODENUM, BIOPSY:  Acute duodenitis; uncertain etiology; no evidence of celiac disease  (See Comment)     B.  STOMACH, ANTRUM, BIOPSY:  Gastric antral & fundic mucosa with fundic PPI-related changes; no significant inflammation; negative for H. pylori, intestinal metaplasia, or dysplasia       Electronically signed by Bhavani Ulloa MD on 6/7/2022 at 12:45 PM   Comment   [facility]MAY[facility]   Duodenitis etiology is uncertain but although an infective agent is not identified, if possible additional stool examination to rule out entities such as Giardia and others should be considered, as well as excluding medication and/or peptic related sources.            9/9/2020 - Examination under anesthesia with high-definition anal microscopy, biopsy and fulguration and anal dysplasia - Colorectal Surgery Dr. Law  [place]  Procedure Date: 09/09/2020      PREOPERATIVE DIAGNOSIS:  Anal dysplasia.      POSTOPERATIVE DIAGNOSIS:  Anal dysplasia.      PROCEDURE:  Examination under anesthesia with high-definition anal microscopy, biopsy and fulguration of anal dysplasia.      HISTORY:  This 52-year-old HIV positive man was seen in the Colorectal Surgery Clinic by VIKASH Hidalgo in 06/2018.  Anal cytology at that time showed LSIL.  The patient could not tolerate microscopy in the clinic and was accordingly referred for operative microscopy.  This proved difficult to schedule due to the patient having been homeless for some period of time.  He now returns for anal microscopy and treatment of any identified dysplasia under anesthesia.  I discussed the risks and alternatives of the procedure in detail to the patient.  I answered all of his questions to his stated satisfaction.  He expresses understanding and provided written informed consent.      SURGEON:  Thanh Lundberg MD.      ASSISTANT:   amy Dumont MD.      DESCRIPTION OF PROCEDURE:  With the patient in the left lateral decubitus position, under intravenous sedation by Anesthesia, the perianal skin was prepped and draped in sterile fashion.  A timeout was performed and the patient and procedure confirmed.  Local infiltration of 0.25% with epinephrine was utilized and a satisfactory perianal block was obtained.  The patient had numerous secretions that required turning off his sedation until these could be controlled.  He is probably a safer bet to perform his next microscopy under general anesthesia where we will have better control of his airway.  He very briefly desaturated, but responded rapidly to mask and had no ongoing problems.  The anal canal and perianal skin were soaked with 5% acetic acid and high-definition anal microscopy was performed using the colposcope.  There were no external lesions.  Eversion of the anal canal showed diffuse acetowhitening  in moderate to coarse punctation extending all the way to the anal margin.  Multiple representative biopsies were obtained from the anal margin and the anal canal and sent for permanent section.  The most intense areas of acetowhitening and punctation were fulgurated using electrocautery.  There were milder areas of acetowhitening that I chose not to address due to the moderately extensive fulguration of the anal canal that I already performed.  I feel it would be safer to await biopsies and repeat microscopy in several months' time to continue with fulguration after he has healed the current procedure.  The procedure was accordingly terminated and the patient was returned to the recovery area in satisfactory condition.  Sponge, needle and instrument counts were reported as correct at the conclusion of the case x2.  There were no immediate operative complications.      FINAL DIAGNOSIS:  A. Anus, left posterior margin, anoscopy:  - Low grade squamous intraepithelial lesion (formerly AIN 1)  - No evidence of high-grade intraepithelial lesion or malignancy    B. Anus, left anterior margin, anoscopy:  - Low grade squamous intraepithelial lesion (formerly AIN 1)  - No evidence of high-grade intraepithelial lesion or malignancy    C. Anus, right medial margin, anoscopy:  - Low grade squamous intraepithelial lesion (formerly AIN 1)  - No evidence of high-grade intraepithelial lesion or malignancy    D. Anus, right medial margin, anoscopy:  - Low grade squamous intraepithelial lesion (formerly AIN 1)  - No evidence of high-grade intraepithelial lesion or malignancy    E. Anus, right posterior anal canal, anoscopy  - Low grade squamous intraepithelial lesion (formerly AIN 1)  - No evidence of high-grade intraepithelial lesion or malignancy    F. Anus, anterior anal canal. anoscopy:  - High grade squamous intraepithelial lesion (formerly AIN 2)  - No evidence of invasive malignancy         1/22/2016 - Colonoscopy - Dr. Rodas  Parveen  Indications:         Chronic diarrhea   Patient Profile:     49 yo Male with AIDS (CD4 24), CMV viremia, CNS                        toxoplasmosis, and anal lesions with HSV+ stain with                        intermittent diarrhea and abdominal pain. Found to have                        partial SBO with thickening in the distal jejunum which                        has resolved on interval imaging. Colonoscopy performed                        for further evaluation.   Findings:       The perianal and digital rectal examinations were normal.        The colon (entire examined portion) appeared normal. Biopsies were taken        with a cold forceps from the right colon and left colon for evaluation        of microscopic colitis.        The ileum, 10-15 cm from the ileocecal valve appeared normal.                                                                                    Impression:          - The entire examined colon is normal. Biopsied to                        evaluate for microscopic colitis.                        - The examined portion of the ileum was normal.                        - Based on this exam, no etiology of small bowel                        thickening was noted. We were unable to reach the area                        of concern. If further evaluation needed, would consider                        single balloon enteroscopy, versus capsule endoscopy,                        versus repeat imaging to see if abnormality persists (as                        already improving on CT).      FINAL DIAGNOSIS:   A. Large colon, right colon, biopsy:   -Colonic mucosa with no significant histologic abnormality   -See comment     B. Large bowel, left colon, biopsy:   -Colonic mucosa with no significant histologic abnormality   -See comment     COMMENT:   Some focal areas showed slightly increased subepithelial collagen.   Trichrome stain performed on both specimens with appropriate controls   show no  conclusive evidence of thickened subepithelial collagen layer.            1/18/2016 - Small Bowel Enteroscopy - Dr. Marcus Nunez  Indications:         Abnormal abdominal CT   Patient Profile:     47 YO M with history of AIDS (CD4 count 24),                        disseminated varicella, CMV viremia, CNS toxoplasmosis,                        anal lesion with +HSV stain now with recurrent                        admissions for crampy lower abdominal pain and loose                        stools. Upon admission actually had no stools. CT scan                        showed question of partial SBO. Thickening seen in the                        distal jejunum near area of transition point. Stool                        infectious studies negative for infection thus far. Now                        patient has antegrade bowel function. Reviewing CT shows                        question of some thickening in duodenum. Now attempting                        EGD with push enteroscopy to evaluate upper GI tract to                        distal jejunum and obtain biopsies.      Impression:          - Z-line regular, 35 cm from the incisors.                        - LA Grade A reflux esophagitis.                        - White plaques mucosa in the esophagus. Biopsied.                        - Striped and patchy moderately erythematous mucosa                        without bleeding was found in the gastric body and in                        the gastric antrum. Some areas appeared with more                        discrete beefy red erythema. There were a few scattered                        small erosions. Biopsied.                        - Normal examined duodenum. Biopsied.                        - The examined portion of the jejunum was normal.                        Biopsied.                        - Many biopsies were taken. Please see details of                        biopsies above under findings      FINAL DIAGNOSIS:  A.  Small bowel, biopsy:  -Small intestinal mucosa with no significant histologic abnormality    B. Stomach, biopsy:  -Features of reactive gastropathy  -Negative for intestinal metaplasia or dysplasia    C. Small bowel, jejunum, biopsy:  -Small intestinal mucosa with no significant histologic abnormality  -Negative for HSV and CMV by immunostaining  -PAS stain negative for fungal elements    D. Small bowel, duodenum, biopsy:  -Duodenal mucosa with no significant histologic abnormality  -Negative for HSV and CMV by immunostaining  -PAS stain negative for fungal elements    E. Stomach, biopsy:  -Gastric mucosa with no significant inflammation  -No H. Pylori like organisms identified on routine sections  -Negative for intestinal metaplasia or dysplasia  -Negative for HSV, CMV and HHV 8 by immunostaining  -GMS stain negative for fungal elements    F. Esophagus, biopsy:  -Squamous epithelium with superficial ulceration, acute inflammation and  ballooning degeneration  -Negative for HSV and CMV by immunostaining  -PAS stain negative for fungal elements               Past Medical History:   Reviewed and edited as appropriate  Past Medical History:   Diagnosis Date    Acute appendicitis with localized peritonitis 1/31/2018    AIDS (H)     Allergic rhinitis due to other allergen     DNS    Anal dysplasia     Chronic abdominal pain     CNS toxoplasmosis (H)     COVID-19 1/11/2022    Diabetes type 2, controlled (H)     GERD (gastroesophageal reflux disease)     Herpes zoster 9/23/2016    History of seizure     History of substance abuse (H)     HIV (human immunodeficiency virus infection) (H)     HLD (hyperlipidemia)     Lung nodules     Periungual wart     Pneumonia 1/6/2019    PTSD (post-traumatic stress disorder)     Sleep apnea     doesn't use CPAP            Past Surgical History:   Reviewed and edited as appropriate   Past Surgical History:   Procedure Laterality Date    ANOSCOPY N/A 9/9/2020    Procedure: Exam Under  Anesthesia, ANOSCOPY, fulgeration of rectal fissures with Rectal Biopsies;  Surgeon: Thanh Lundberg MD;  [facility]    COLONOSCOPY Left 1/22/2016    Procedure: COMBINED COLONOSCOPY, SINGLE OR MULTIPLE BIOPSY/POLYPECTOMY BY BIOPSY;  Surgeon: Clark Saini MD;  [facility]    ESOPHAGOSCOPY, GASTROSCOPY, DUODENOSCOPY (EGD), COMBINED N/A 6/6/2022    Procedure: ESOPHAGOGASTRODUODENOSCOPY, WITH BIOPSY;  Surgeon: Kecia Benítez MD;  [facility]    ESOPHAGOSCOPY, GASTROSCOPY, DUODENOSCOPY (EGD), COMBINED N/A 10/10/2024    Procedure: Esophagoscopy, gastroscopy, duodenoscopy (EGD), combined;  Surgeon: Nico Bhatti DO;  Location: [facility]    HC EXPLORE UNDESC TESTIS,INGUIN/SCROTAL      LAPAROSCOPIC APPENDECTOMY N/A 1/31/2018    Procedure: LAPAROSCOPIC APPENDECTOMY;  LAPAROSCOPIC APPENDECTOMY;  Surgeon: Dawn Holt MD;  [facility]    LAPAROSCOPY DIAGNOSTIC (GENERAL) N/A 7/26/2016    Procedure: LAPAROSCOPY DIAGNOSTIC (GENERAL);  Surgeon: Susannah Arriaga MD;  [facility]    LAPAROSCOPY DIAGNOSTIC (GENERAL) N/A 4/16/2018    Procedure: LAPAROSCOPY DIAGNOSTIC (GENERAL);  Diagnostic laparoscopy and lysis of adhesions;  Surgeon: Prince Dowling MD;  [facility]    OPTICAL TRACKING SYSTEM CRANIOTOMY, EXCISE TUMOR, COMBINED Left 4/10/2015    Procedure: COMBINED OPTICAL TRACKING SYSTEM CRANIOTOMY, EXCISE TUMOR;  Surgeon: Mirlande Colmenares MD;  [facility]    REPAIR GAMEKEEPER'S THUMB Right 12/2/2016    Procedure: REPAIR LIGAMENT ULNAR COLLATERAL THUMB (GAMEKEEPER'S);  Surgeon: Evin Zamorano MD;  [facility]    ZZC NONSPECIFIC PROCEDURE      right forearm fracture              Social History:   The patient reports being homeless.     Alcohol: Denies.   Tobacco:  Intermittent cigarette smoking when reportedly can afford cigarettes.   Illicit drugs: Denies. However UDS 10/8/2024 positive for amphetamines.            Family History:   Patient's family history  "is reviewed today and is non-contributory    Family History   Problem Relation Age of Onset    Diabetes Brother     Diabetes Father     Alzheimer Disease Father     Unknown/Adopted Mother     Diabetes Paternal Grandfather     Cancer No family hx of         no skin cancer    Skin Cancer No family hx of         no famiy hx of skin cancer    Glaucoma No family hx of     Macular Degeneration No family hx of              Allergies:   Reviewed and edited as appropriate     Allergies   Allergen Reactions    Fentanyl Blisters     Per pt, after taking this medication had blisters develope    Tylenol [Acetaminophen] Itching    Dulaglutide Rash    Insulin Lispro Rash     Patient reported    Penicillin V Other (See Comments) and Rash     Diffuse maculopapular rash + feels \"high\", per pt.             Medications:     Current Facility-Administered Medications   Medication Dose Route Frequency Provider Last Rate Last Admin    bictegravir-emtricitabine-tenofovir (BIKTARVY) -25 MG per tablet 1 tablet  1 tablet Oral Daily Osbaldo Keen MD        glucose gel 15-30 g  15-30 g Oral Q15 Min PRN Osbaldo Keen MD        Or    dextrose 50 % injection 25-50 mL  25-50 mL Intravenous Q15 Min PRN Osbaldo Keen MD        Or    glucagon injection 1 mg  1 mg Subcutaneous Q15 Min PRN Osbaldo Keen MD        enoxaparin ANTICOAGULANT (LOVENOX) injection 40 mg  40 mg Subcutaneous Q24H Osbaldo Keen MD   40 mg at 10/23/24 0422    insulin aspart (NovoLOG) injection (RAPID ACTING)  1-7 Units Subcutaneous Q4H Osbaldo Keen MD   6 Units at 10/23/24 0625    insulin glargine (LANTUS PEN) injection 14 Units  14 Units Subcutaneous QAM AC Osbaldo Keen MD        lidocaine (LMX4) cream   Topical Q1H PRN Osbaldo Keen MD        lidocaine 1 % 0.1-1 mL  0.1-1 mL Other Q1H PRN Osbaldo Keen MD        sodium chloride (PF) 0.9% PF flush 3 mL  3 mL Intracatheter Q8H Osbaldo Keen MD        sodium chloride (PF) 0.9% PF flush " 3 mL  3 mL Intracatheter q1 min prn Osbaldo Keen MD         Current Outpatient Medications   Medication Sig Dispense Refill    bictegravir-emtricitabine-tenofovir (BIKTARVY) -25 MG per tablet Take 1 tablet by mouth daily 30 tablet 2    blood glucose (NO BRAND SPECIFIED) lancets standard Use to test blood sugar 1 time daily or as directed. 100 Lancet 4    blood glucose (NO BRAND SPECIFIED) test strip Use to test blood sugar 1 time daily or as directed. 100 strip 4    blood glucose monitoring (NO BRAND SPECIFIED) meter device kit Use to test blood sugar 3 times daily or as directed. 1 kit 0    Continuous Glucose Sensor (DEXCOM G6 SENSOR) MISC 1 each every 10 days Change every 10 days. 3 each 11    Continuous Glucose Sensor (DEXCOM G7 SENSOR) MISC 1 Device every 10 days. 9 each 3    Continuous Glucose Transmitter (DEXCOM G6 TRANSMITTER) MISC 1 each every 3 months Change every 3 months. 1 each 3    famotidine (PEPCID) 40 MG tablet Take 1 tablet (40 mg) by mouth daily 30 tablet 2    glipiZIDE (GLUCOTROL XL) 10 MG 24 hr tablet Take 1 tablet (10 mg) by mouth daily 90 tablet 1    insulin glargine (LANTUS PEN) 100 UNIT/ML pen Inject 27 units subcutaneously 2 times daily to lower blood sugar. Discard pens after they've been out of the fridge for 28 days. Keep at room temperature. 15 mL 1    insulin pen needle (32G X 4 MM) 32G X 4 MM miscellaneous Use 4-5 pen needles daily or as directed. 100 each 2    Nutritional Supplements (ENSURE ACTIVE) LIQD Take 414 mLs by mouth daily 30 mL 11             Review of Systems:     A complete review of systems was performed and is negative except as noted in the HPI           Physical Exam:   Temp: 98.4  F (36.9  C) Temp src: Oral BP: (!) 147/83 Pulse: 102   Resp: 17 SpO2: 99 % O2 Device: None (Room air)    Wt:   Wt Readings from Last 2 Encounters:   10/22/24 70.3 kg (155 lb)   10/11/24 74.8 kg (165 lb)        General: 58 year old male lying in bed in NAD. Appears comfortable.   Answers appropriately.    HEENT: Head is AT/NC. Sclera anicteric. +NGT present to LIS with minimal bilious appearing liquid in the tubing.   Lungs: No increased work of breathing, speaking in full sentences, equal chest rise. On Room Air.   Heart: +tachycardic. Peripheral perfusion intact.  Abdomen: Soft, non-distended, +minimal tenderness in the left lumbar abdominal region. No peritoneal signs.  Extremities: WWP.  Musculoskeletal: No gross deformity.  Skin: No jaundice or rash on exposed skin.  Neurologic: Grossly non-focal.  CN 2-12 grossly intact.   Mental status/Psych: A&O. Asks/answers questions appropriately. Flat affect. +minimally interactive, falling asleep throughout the interview but arouseable with tapping of shoulder.             Data:   LAB WORK:    BMP  Recent Labs   Lab 10/23/24  0603 10/22/24  2245 10/21/24  1702 10/21/24  1031   NA  --  134*  --  133*   POTASSIUM  --  3.7  --  4.1   CHLORIDE  --  99  --  103   LINDA  --  9.2  --  9.4   CO2  --  23  --  17*   BUN  --  9.8  --  12.5   CR  --  0.65*  --  0.72   * 410* 193* 367*     CBC  Recent Labs   Lab 10/23/24  0721 10/22/24  2245 10/21/24  1031   WBC 5.8 7.5 6.8   RBC 4.14* 4.19* 4.17*   HGB 12.1* 12.5* 12.3*   HCT 36.5* 37.0* 38.0*   MCV 88 88 91   MCH 29.2 29.8 29.5   MCHC 33.2 33.8 32.4   RDW 12.9 12.9 13.0    383 347     INR  Recent Labs   Lab 10/22/24  2245 10/21/24  1031   INR 0.93 0.99     LFTs  Recent Labs   Lab 10/22/24  2245 10/21/24  1031   ALKPHOS 156* 146   AST 19 25   ALT 18 16   BILITOTAL 0.3 0.3   PROTTOTAL 6.9 7.0   ALBUMIN 3.7 3.7      PANC  Recent Labs   Lab 10/21/24  1031   LIPASE 73*       IMAGING:    ABDOMEN SINGLE VIEW PORTABLE  LOCATION: [facility]  DATE/TIME: 10/23/2024 2:55 AM CDT     INDICATION: Tube placement.  COMPARISON: 11/10/2024.                                                                   IMPRESSION: An enteric drainage tube is present with the distal  tip in the gastric body.         CT ABDOMEN PELVIS W CONTRAST  LOCATION: [facility]  DATE: 10/23/2024  INDICATION: abd pain  COMPARISON: 10/21/2024  TECHNIQUE: CT scan of the abdomen and pelvis was performed following injection of IV contrast. Multiplanar reformats were obtained. Dose reduction techniques were used.  CONTRAST: 92ml isovue 370     FINDINGS:   LOWER CHEST: Dependent atelectasis in the posterior lung bases, greater on the left.     HEPATOBILIARY: Gallbladder is nondistended and contains a calcified gallstone. No significant mass or bile duct dilatation.      PANCREAS: No significant mass, duct dilatation, or inflammatory change.     SPLEEN: Normal.     ADRENAL GLANDS: Normal.     KIDNEYS/BLADDER: The bilateral kidneys enhance symmetrically without evidence for hydronephrosis or pyelonephritis. No renal masses or calculi noted. The bilateral ureters and urinary bladder are unremarkable.     BOWEL: Moderate to marked interval fluid-filled distention of the stomach. Clinical correlation for gastric outlet obstruction symptoms. Nonspecific bowel gas pattern otherwise. Appendix not seen.     LYMPH NODES: No lymphadenopathy.     VASCULATURE: No abdominal aortic aneurysm. Mild vascular calcifications abdominal aorta and common iliac arteries.     PELVIC ORGANS: Penile implant with reservoir left lower pelvis anteriorly. No pelvic free fluid.     MUSCULOSKELETAL: No concerning osseous abnormalities. No inguinal hernias. Small periumbilical ventral hernia.                                                                   IMPRESSION:   1.  Moderate to marked interval fluid-filled distention of the stomach since prior exam. Clinical correlation for gastric outlet obstruction symptoms.  2.  Cholelithiasis without gallbladder distention. No bile duct dilatation.  3.  Dependent atelectasis in the posterior lung bases, greater on the left.  4.  Penile implant with  reservoir left lower pelvis anteriorly.  5.  Small periumbilical ventral hernia.        =======================================================================

## 2024-10-23 NOTE — PROGRESS NOTES
Brief progress note:    Informed that pt's BS decreased to 66 after 11 unit(s) of Novolog were given. Per protocol got D50 and improved to 125 on 15 minute recheck. He was not symptomatic with this episode. Given that he was exquisitely sensitive to this insulin administration I believe that he does not have severe insulin resistance as initially thought, more likely he is not getting/using his insulin as prescribed/needed as an outpatient. Decreased sliding scale insulin order from very high correction dosing to low correction dosing. Decreased Lantus from 20u to 15u.

## 2024-10-23 NOTE — H&P
"[facility]    History and Physical - Medicine Service, [facility] TEAM        Date of Admission:  10/22/2024    Assessment & Plan      Andrew Collier is a 58 year old male admitted on 10/22/2024. He has a history of HIV, Type II DM, GERD, and multiple small bowel obstructions who presented to the ED with abdominal pain and vomiting with CT imaging concerning for gastric outlet obstruction.    #Gastric outlet obstruction  Abdominal pain in LUQ started 10/22 afternoon. CT A/P showed moderate to marked interval fluid-filled distention of the stomach since prior CTs. Lactate elevated 2.2 and on recheck 1.8. Had been admitted earlier in 10/2024 for similar presentation and was diagnosed with SBO but left AMA prior to any intervention. Suspect this presentation is related to his untreated of ongoing gastric outlet obstruction.   - GI consulted  - NPO  - NG to LIS  - Acetaminophen PRN for pain    #HIV  Most recent CD4 423 (10/10/24). Follows with Dr. Ivory last seen 7/2024.  - Continue PTA Biktarvy    #Type II DM   Most recent A1c 11.7 in 7/2024. Reports last taking Lantus 27U BID and last dose was 10/22 AM. BG on admission was 410.   - Hold PTA glipizide  - Lantus 14U in setting of NPO status  - MDSSI    #GERD  Hold PTA famotidine    #HTN  Noted in problem list. SBPs 140s in ED. Consider BP management as outpatient.         Diet: NPO for Medical/Clinical Reasons Except for: Meds    DVT Prophylaxis: Enoxaparin (Lovenox) SQ  Potts Catheter: Not present  Lines: None     Cardiac Monitoring: None  Code Status: Full Code    Clinically Significant Risk Factors Present on Admission         # Hyponatremia: Lowest Na = 133 mmol/L in last 2 days, will monitor as appropriate                  # DMII: A1C = N/A within past 6 months   # Overweight: Estimated body mass index is 26.61 kg/m  as calculated from the following:    Height as of this encounter: 1.626 m (5' 4\").    Weight " as of this encounter: 70.3 kg (155 lb).       # Financial/Environmental Concerns:    # Support System: poor social support noted in nursing assessment  # Housing Instability: noted in nursing assessment         Disposition Plan      Expected Discharge Date: 10/25/2024                    Patient to be formally staffed in the morning.       Osbaldo Keen MD  Medicine Service, [facility]  Securely message with [employer] (more info)  Text page via [facility] Paging/Directory   See signed in provider for up to date coverage information  ______________________________________________________________________    Chief Complaint   Abdominal pain    History is obtained from the patient and electronic health record    History of Present Illness   Andrew Collier is a 58 year old male who presents with one day of worsening left sided abdominal pain. He states the pain started yesterday (10/21) and has had associated non-bloody vomiting. States last bowel movement was diarrhea 10/22. No fevers, no SOB, no radiation of pain and no pain with urination or increased frequency.       Past Medical History    Past Medical History:   Diagnosis Date    Acute appendicitis with localized peritonitis 1/31/2018    AIDS (H)     Allergic rhinitis due to other allergen     DNS    Anal dysplasia     Chronic abdominal pain     CNS toxoplasmosis (H)     COVID-19 1/11/2022    Diabetes type 2, controlled (H)     GERD (gastroesophageal reflux disease)     Herpes zoster 9/23/2016    History of seizure     History of substance abuse (H)     HIV (human immunodeficiency virus infection) (H)     HLD (hyperlipidemia)     Lung nodules     Periungual wart     Pneumonia 1/6/2019    PTSD (post-traumatic stress disorder)     Sleep apnea     doesn't use CPAP       Past Surgical History   Past Surgical History:   Procedure Laterality Date    ANOSCOPY N/A 9/9/2020    Procedure: Exam Under Anesthesia,  ANOSCOPY, fulgeration of rectal fissures with Rectal Biopsies;  Surgeon: Thanh Lundberg MD;  [facility]    COLONOSCOPY Left 2016    Procedure: COMBINED COLONOSCOPY, SINGLE OR MULTIPLE BIOPSY/POLYPECTOMY BY BIOPSY;  Surgeon: Clark Saini MD;  [facility]    ESOPHAGOSCOPY, GASTROSCOPY, DUODENOSCOPY (EGD), COMBINED N/A 2022    Procedure: ESOPHAGOGASTRODUODENOSCOPY, WITH BIOPSY;  Surgeon: Kecia Benítez MD;  [facility]    ESOPHAGOSCOPY, GASTROSCOPY, DUODENOSCOPY (EGD), COMBINED N/A 10/10/2024    Procedure: Esophagoscopy, gastroscopy, duodenoscopy (EGD), combined;  Surgeon: Nico Bhatti DO;  [facility]    HC EXPLORE UNDESC TESTIS,INGUIN/SCROTAL      LAPAROSCOPIC APPENDECTOMY N/A 2018    Procedure: LAPAROSCOPIC APPENDECTOMY;  LAPAROSCOPIC APPENDECTOMY;  Surgeon: Dawn Holt MD;  [facility]    LAPAROSCOPY DIAGNOSTIC (GENERAL) N/A 2016    Procedure: LAPAROSCOPY DIAGNOSTIC (GENERAL);  Surgeon: Susannah Arriaga MD;  [facility]    LAPAROSCOPY DIAGNOSTIC (GENERAL) N/A 2018    Procedure: LAPAROSCOPY DIAGNOSTIC (GENERAL);  Diagnostic laparoscopy and lysis of adhesions;  Surgeon: Prince Dowling MD;  [facility]    OPTICAL TRACKING SYSTEM CRANIOTOMY, EXCISE TUMOR, COMBINED Left 4/10/2015    Procedure: COMBINED OPTICAL TRACKING SYSTEM CRANIOTOMY, EXCISE TUMOR;  Surgeon: Mirlande Colmenares MD;  [facility]    REPAIR GAMEKEEPER'S THUMB Right 2016    Procedure: REPAIR LIGAMENT ULNAR COLLATERAL THUMB (GAMEKEEPER'S);  Surgeon: Evin Zamorano MD;  [facility]    ZZC NONSPECIFIC PROCEDURE      right forearm fracture       Prior to Admission Medications   Prior to Admission Medications   Prescriptions Last Dose Informant Patient Reported? Taking?   Continuous Glucose Sensor (DEXCOM G6 SENSOR) MISC   No No   Si each every 10 days Change every 10 days.   Continuous Glucose Sensor (DEXCOM G7 SENSOR) MISC   No No   Si  Device every 10 days.   Continuous Glucose Transmitter (DEXCOM G6 TRANSMITTER) MISC   No No   Si each every 3 months Change every 3 months.   Nutritional Supplements (ENSURE ACTIVE) LIQD   No No   Sig: Take 414 mLs by mouth daily   bictegravir-emtricitabine-tenofovir (BIKTARVY) -25 MG per tablet   No No   Sig: Take 1 tablet by mouth daily   blood glucose (NO BRAND SPECIFIED) lancets standard   No No   Sig: Use to test blood sugar 1 time daily or as directed.   blood glucose (NO BRAND SPECIFIED) test strip   No No   Sig: Use to test blood sugar 1 time daily or as directed.   blood glucose monitoring (NO BRAND SPECIFIED) meter device kit   No No   Sig: Use to test blood sugar 3 times daily or as directed.   famotidine (PEPCID) 40 MG tablet   No No   Sig: Take 1 tablet (40 mg) by mouth daily   glipiZIDE (GLUCOTROL XL) 10 MG 24 hr tablet   No No   Sig: Take 1 tablet (10 mg) by mouth daily   insulin glargine (LANTUS PEN) 100 UNIT/ML pen   No No   Sig: Inject 27 units subcutaneously 2 times daily to lower blood sugar. Discard pens after they've been out of the fridge for 28 days. Keep at room temperature.   insulin pen needle (32G X 4 MM) 32G X 4 MM miscellaneous   No No   Sig: Use 4-5 pen needles daily or as directed.      Facility-Administered Medications: None        Social History   I have reviewed this patient's social history and updated it with pertinent information if needed.  Social History     Tobacco Use    Smoking status: Some Days     Current packs/day: 0.25     Average packs/day: 0.3 packs/day for 40.0 years (10.0 ttl pk-yrs)     Types: Cigarettes    Smokeless tobacco: Former   Substance Use Topics    Alcohol use: No     Alcohol/week: 0.0 standard drinks of alcohol     Comment: Last etoh in     Drug use: Not Currently     Types: Marijuana     Comment: chart indicated meth use but pt denies; states he does not use drugs        Physical Exam   Vital Signs: Temp: 98.4  F (36.9  C) Temp src: Oral  BP: (!) 160/80 Pulse: 102   Resp: 18 SpO2: 100 % O2 Device: None (Room air)    Weight: 155 lbs 0 oz    Constitutional: asleep in room in no acute distress, answers questions appropriately  Respiratory: No increased work of breathing, on room air good air exchange, clear to auscultation bilaterally, no crackles or wheezing  Cardiovascular: RRR, no murmurs, extremities warm  GI: normoactive bowel sounds, abdomen soft and distended, tenderness to palpation at LUQ without guarding or rebound tenderness. No hepatosplenomegaly  Skin: Non-jaundiced, scattered ecchymoses on extremities, no rashes/lesion on uncovered areas  Musculoskeletal: no lower extremity pitting edema present  there is no redness, warmth, or swelling of the joints  Neurologic: oriented x3, moves all extremities, refused eye exam.     Medical Decision Making       Please see A&P for additional details of medical decision making.      Data     I have personally reviewed the following data over the past 24 hrs:    7.5  \   12.5 (L)   / 383     134 (L) 99 9.8 /  410 (H)   3.7 23 0.65 (L) \     ALT: 18 AST: 19 AP: 156 (H) TBILI: 0.3   ALB: 3.7 TOT PROTEIN: 6.9 LIPASE: N/A     Procal: N/A CRP: N/A Lactic Acid: 1.8       INR:  0.93 PTT:  N/A   D-dimer:  N/A Fibrinogen:  N/A       Imaging results reviewed over the past 24 hrs:   Recent Results (from the past 24 hours)   CT Abdomen Pelvis w Contrast    Narrative    EXAM: CT ABDOMEN PELVIS W CONTRAST  LOCATION: [facility]  DATE: 10/23/2024    INDICATION: abd pain  COMPARISON: 10/21/2024  TECHNIQUE: CT scan of the abdomen and pelvis was performed following injection of IV contrast. Multiplanar reformats were obtained. Dose reduction techniques were used.  CONTRAST: 92ml isovue 370    FINDINGS:   LOWER CHEST: Dependent atelectasis in the posterior lung bases, greater on the left.    HEPATOBILIARY: Gallbladder is nondistended and contains a calcified gallstone. No  significant mass or bile duct dilatation.     PANCREAS: No significant mass, duct dilatation, or inflammatory change.    SPLEEN: Normal.    ADRENAL GLANDS: Normal.    KIDNEYS/BLADDER: The bilateral kidneys enhance symmetrically without evidence for hydronephrosis or pyelonephritis. No renal masses or calculi noted. The bilateral ureters and urinary bladder are unremarkable.    BOWEL: Moderate to marked interval fluid-filled distention of the stomach. Clinical correlation for gastric outlet obstruction symptoms. Nonspecific bowel gas pattern otherwise. Appendix not seen.    LYMPH NODES: No lymphadenopathy.    VASCULATURE: No abdominal aortic aneurysm. Mild vascular calcifications abdominal aorta and common iliac arteries.    PELVIC ORGANS: Penile implant with reservoir left lower pelvis anteriorly. No pelvic free fluid.    MUSCULOSKELETAL: No concerning osseous abnormalities. No inguinal hernias. Small periumbilical ventral hernia.      Impression    IMPRESSION:   1.  Moderate to marked interval fluid-filled distention of the stomach since prior exam. Clinical correlation for gastric outlet obstruction symptoms.  2.  Cholelithiasis without gallbladder distention. No bile duct dilatation.  3.  Dependent atelectasis in the posterior lung bases, greater on the left.  4.  Penile implant with reservoir left lower pelvis anteriorly.  5.  Small periumbilical ventral hernia.     XR Abdomen Port 1 View    Narrative    EXAM: ABDOMEN SINGLE VIEW PORTABLE  LOCATION: [facility]  DATE/TIME: 10/23/2024 2:55 AM CDT    INDICATION: Tube placement.  COMPARISON: 11/10/2024.      Impression    IMPRESSION: An enteric drainage tube is present with the distal tip in the gastric body.

## 2024-10-24 LAB
AMPHETAMINES UR QL SCN: ABNORMAL
ANION GAP SERPL CALCULATED.3IONS-SCNC: 7 MMOL/L (ref 7–15)
BARBITURATES UR QL SCN: ABNORMAL
BENZODIAZ UR QL SCN: ABNORMAL
BUN SERPL-MCNC: 9.7 MG/DL (ref 8–23)
BZE UR QL SCN: ABNORMAL
CALCIUM SERPL-MCNC: 8.9 MG/DL (ref 8.8–10.4)
CANNABINOIDS UR QL SCN: ABNORMAL
CHLORIDE SERPL-SCNC: 108 MMOL/L (ref 98–107)
CREAT SERPL-MCNC: 0.75 MG/DL (ref 0.67–1.17)
EGFRCR SERPLBLD CKD-EPI 2021: >90 ML/MIN/1.73M2
FENTANYL UR QL: ABNORMAL
GLUCOSE BLDC GLUCOMTR-MCNC: 287 MG/DL (ref 70–99)
GLUCOSE BLDC GLUCOMTR-MCNC: 317 MG/DL (ref 70–99)
GLUCOSE BLDC GLUCOMTR-MCNC: 328 MG/DL (ref 70–99)
GLUCOSE BLDC GLUCOMTR-MCNC: 78 MG/DL (ref 70–99)
GLUCOSE BLDC GLUCOMTR-MCNC: 81 MG/DL (ref 70–99)
GLUCOSE BLDC GLUCOMTR-MCNC: 93 MG/DL (ref 70–99)
GLUCOSE SERPL-MCNC: 95 MG/DL (ref 70–99)
HCO3 SERPL-SCNC: 25 MMOL/L (ref 22–29)
OPIATES UR QL SCN: ABNORMAL
PCP QUAL URINE (ROCHE): ABNORMAL
POTASSIUM SERPL-SCNC: 3.6 MMOL/L (ref 3.4–5.3)
SODIUM SERPL-SCNC: 140 MMOL/L (ref 135–145)

## 2024-10-24 PROCEDURE — 82962 GLUCOSE BLOOD TEST: CPT

## 2024-10-24 PROCEDURE — 36415 COLL VENOUS BLD VENIPUNCTURE: CPT

## 2024-10-24 PROCEDURE — 258N000003 HC RX IP 258 OP 636

## 2024-10-24 PROCEDURE — 250N000012 HC RX MED GY IP 250 OP 636 PS 637

## 2024-10-24 PROCEDURE — 80048 BASIC METABOLIC PNL TOTAL CA: CPT

## 2024-10-24 PROCEDURE — 250N000013 HC RX MED GY IP 250 OP 250 PS 637

## 2024-10-24 PROCEDURE — 250N000011 HC RX IP 250 OP 636

## 2024-10-24 PROCEDURE — 120N000002 HC R&B MED SURG/OB UMMC

## 2024-10-24 PROCEDURE — 99232 SBSQ HOSP IP/OBS MODERATE 35: CPT | Mod: GC | Performed by: STUDENT IN AN ORGANIZED HEALTH CARE EDUCATION/TRAINING PROGRAM

## 2024-10-24 PROCEDURE — 250N000013 HC RX MED GY IP 250 OP 250 PS 637: Performed by: STUDENT IN AN ORGANIZED HEALTH CARE EDUCATION/TRAINING PROGRAM

## 2024-10-24 RX ORDER — NALOXONE HYDROCHLORIDE 0.4 MG/ML
0.4 INJECTION, SOLUTION INTRAMUSCULAR; INTRAVENOUS; SUBCUTANEOUS
Status: DISCONTINUED | OUTPATIENT
Start: 2024-10-24 | End: 2024-10-26 | Stop reason: HOSPADM

## 2024-10-24 RX ORDER — NALOXONE HYDROCHLORIDE 0.4 MG/ML
0.2 INJECTION, SOLUTION INTRAMUSCULAR; INTRAVENOUS; SUBCUTANEOUS
Status: DISCONTINUED | OUTPATIENT
Start: 2024-10-24 | End: 2024-10-26 | Stop reason: HOSPADM

## 2024-10-24 RX ORDER — OXYCODONE HYDROCHLORIDE 10 MG/1
10 TABLET ORAL EVERY 4 HOURS PRN
Status: DISCONTINUED | OUTPATIENT
Start: 2024-10-24 | End: 2024-10-24

## 2024-10-24 RX ORDER — OXYCODONE HYDROCHLORIDE 5 MG/1
5 TABLET ORAL EVERY 4 HOURS PRN
Status: DISCONTINUED | OUTPATIENT
Start: 2024-10-24 | End: 2024-10-24

## 2024-10-24 RX ORDER — ONDANSETRON 4 MG/1
4 TABLET, ORALLY DISINTEGRATING ORAL EVERY 6 HOURS PRN
Status: DISCONTINUED | OUTPATIENT
Start: 2024-10-24 | End: 2024-10-26 | Stop reason: HOSPADM

## 2024-10-24 RX ORDER — OXYCODONE HYDROCHLORIDE 5 MG/1
5 TABLET ORAL
Status: DISCONTINUED | OUTPATIENT
Start: 2024-10-24 | End: 2024-10-24

## 2024-10-24 RX ORDER — SENNOSIDES 8.6 MG
8.6 TABLET ORAL 2 TIMES DAILY PRN
Status: DISCONTINUED | OUTPATIENT
Start: 2024-10-24 | End: 2024-10-26 | Stop reason: HOSPADM

## 2024-10-24 RX ORDER — HYDROMORPHONE HCL IN WATER/PF 6 MG/30 ML
0.2 PATIENT CONTROLLED ANALGESIA SYRINGE INTRAVENOUS ONCE
Status: COMPLETED | OUTPATIENT
Start: 2024-10-24 | End: 2024-10-24

## 2024-10-24 RX ORDER — POLYETHYLENE GLYCOL 3350 17 G/17G
17 POWDER, FOR SOLUTION ORAL DAILY PRN
Status: DISCONTINUED | OUTPATIENT
Start: 2024-10-24 | End: 2024-10-26 | Stop reason: HOSPADM

## 2024-10-24 RX ORDER — IBUPROFEN 600 MG/1
600 TABLET, FILM COATED ORAL EVERY 6 HOURS PRN
Status: DISCONTINUED | OUTPATIENT
Start: 2024-10-24 | End: 2024-10-24

## 2024-10-24 RX ORDER — HYOSCYAMINE SULFATE 0.125 MG
125 TABLET ORAL EVERY 6 HOURS PRN
Status: DISCONTINUED | OUTPATIENT
Start: 2024-10-24 | End: 2024-10-26 | Stop reason: HOSPADM

## 2024-10-24 RX ADMIN — ENOXAPARIN SODIUM 40 MG: 40 INJECTION SUBCUTANEOUS at 04:22

## 2024-10-24 RX ADMIN — SODIUM CHLORIDE: 9 INJECTION, SOLUTION INTRAVENOUS at 02:44

## 2024-10-24 RX ADMIN — IBUPROFEN 600 MG: 600 TABLET ORAL at 11:05

## 2024-10-24 RX ADMIN — PANTOPRAZOLE SODIUM 40 MG: 40 TABLET, DELAYED RELEASE ORAL at 08:31

## 2024-10-24 RX ADMIN — INSULIN ASPART 2 UNITS: 100 INJECTION, SOLUTION INTRAVENOUS; SUBCUTANEOUS at 20:24

## 2024-10-24 RX ADMIN — HYDROMORPHONE HYDROCHLORIDE 0.2 MG: 0.2 INJECTION, SOLUTION INTRAMUSCULAR; INTRAVENOUS; SUBCUTANEOUS at 04:22

## 2024-10-24 RX ADMIN — PANTOPRAZOLE SODIUM 40 MG: 40 TABLET, DELAYED RELEASE ORAL at 16:58

## 2024-10-24 RX ADMIN — BICTEGRAVIR SODIUM, EMTRICITABINE, AND TENOFOVIR ALAFENAMIDE FUMARATE 1 TABLET: 50; 200; 25 TABLET ORAL at 08:31

## 2024-10-24 RX ADMIN — INSULIN ASPART 2 UNITS: 100 INJECTION, SOLUTION INTRAVENOUS; SUBCUTANEOUS at 17:27

## 2024-10-24 NOTE — SUMMARY OF CARE
Reason for admission: Abdominal pain and N/V  Admitted from:  ED  Report received from:      Wisam RN    2 RN skin assessment completed by: Refused      - Findings (add LDA if needed):   Bed algorithm reevaluated: Yes  Was airflow pump ordered?: No  Suction set up in room? Yes  Care plan (primary problem) and education initiated: Yes  MDRO education done if applicable: Yes  Pt informed about policy regarding no IV pumps off unit: Yes  Flu shot ordered? (October-April only): No  Detailed Belongings: Jacket, shorts, shoes, hat, and black bag

## 2024-10-24 NOTE — PROGRESS NOTES
[facility]    Medicine Progress Note - Medicine Service, JORGE TEAM 1    Date of Admission:  10/22/2024    Assessment & Plan   57 y/o M with pmhx of HIV, Esophagitis, poorly controlled DM2, TOMMY, h/o SBO, and difficult social situation presents for LUQ. Has had prior admission (10/7-10/10) with concern for gastric outlet obstruction (GOO) and recurrent small bowel obstruction. Multiple additional ER visits since then for similar symptoms. Similar to prior pains that he has had with SBO in the past. Imaging was negative for SBO, It did show severely dilated stomach and there was concern for GOO however this is felt to be unlikely given prior EGD (10/10/24) not showing GOO. He is admitted for management of this recurrent abd pain and n/v.     Today:  - No nausea or emesis. Had 200-300 of NG tube output. Trial clamping of NG tube today and advancing diet, if tolerating will get MR Enterography tomorrow  - NPO at [facility]  - BS well controlled overnight, continue on 15u Lantus and LDSSI, CTM. Plan to consult Endocrinology tomorrow for discharge insulin planning.   - Stopped mIVF  - Having diffuse abd pain but still passing gas, no abd distension. got 1 dose of dilaudid overnight. Will avoid opiates at this time due to slow gastric motility, Ibuprofen and Levsin ordered.    #Abd pain  #N/v  #Gastroparesis?  #h/o SBO  #Esophagitis  Abdominal pain in LUQ started 10/22 afternoon. CT A/P showed moderate to marked interval fluid-filled distention of the stomach since prior CTs. Lactate elevated 2.2 and on recheck 1.8. Had been admitted earlier in October for similar presentation and was diagnosed with SBO but left AMA prior to any intervention. EGD was performed showing LA Grade B Esophagitis without gastric outlet obstruction. Current highest concern is for gastroparesis with likely contribution from poorly controlled DM, Methamphetamine use, and HIV neuropathy.   - Trial clamping NG  and advancing diet today  - MR Enterography tomorrow  - PPI 40mg BID  - Consult RD regarding diabetic and gastroparesis diet once he can eat    #Poorly controlled, insulin dependent, DM2  Home dose of 27u Lantus once a day and Glipizide 10mg. Reports that his BS outpatient is very uncontrolled with values regularly in the 400s. A1c of 11.7% on 7/13/24.   - A1C is 10.5% on admission  - continue LDSSI and 15u Lantus. With resumption of diet today may need to make further adjustments  - Consult Endocrinology tomorrow    #Methamphetamine use disorder  Denies methamphetamine use. Reports no use for 3 years. UDS from 10/8/24 was positive for Amphetamines, review of medication list does not immediately show anything that would cause a false positive.   - Denies any usage of amphetamines, reports that his test is positive because people around him smoke methamphetamine  - May consult addiction medicine if further conversation with pt is not fruitful    #Tobacco Use  Does not want nicotine replacement at this time    #HIV  Most recent CD4 423 (10/10/24). Follows with Dr. Ivory last seen 7/2024.  - Continue Biktarvy    #Social Determinants of Health  Appears to have poor medication adherence/availability despite pt report. Currently staying at a shelter without stable housing. Does not have a phone.   - Updated SW on pt's request for specific contact with Briseyda/Na Hammer and a referral to a specific shelter.         Diet: Regular Diet Adult    DVT Prophylaxis: Enoxaparin (Lovenox) SQ  Potts Catheter: Not present  Lines: 1 PIV    Cardiac Monitoring: None  Code Status: Full Code      Clinically Significant Risk Factors         # Hyponatremia: Lowest Na = 133 mmol/L in last 2 days, will monitor as appropriate  # Hyperchloremia: Highest Cl = 108 mmol/L in last 2 days, will monitor as appropriate                       # DMII: A1C = 10.5 % (Ref range: <5.7 %) within past 6 months   # Overweight: Estimated body mass index is  "26.61 kg/m  as calculated from the following:    Height as of this encounter: 1.626 m (5' 4\").    Weight as of this encounter: 70.3 kg (155 lb)., PRESENT ON ADMISSION       # Financial/Environmental Concerns:    # Support System: poor social support noted in nursing assessment  # Housing Instability: noted in nursing assessment         Disposition Plan     Medically Ready for Discharge: Anticipated in 2-4 Days       The patient's care was discussed with the Attending Physician, Dr. Tariq .    Lam Huber MD  Medicine Service, [facility] TEAM [address] [place] [address]  Securely message with [employer] (more info)  Text page via [facility] Paging/Directory   See signed in provider for up to date coverage information  ______________________________________________________________________    Interval History   BS well controlled overnight. Reports he began having increased abd pain overnight, diffuse 8/10, does not feel similar to admission pain and he is not having any abd distension, n/v. Was offered Oxycodone last night but refused \"because it's too addictive.\" Got 1 dose of 0.2mg dilaudid overnight which did not help much. Has a reported anaphylactic allergy to Tylenol. Has not had BM since admission but has passed gas this morning.     He is requesting that we contact a Briseyda or Na Hammer who works with the homeless community in the city. Is hoping that she can help refer him to a specific shelter that has better accommodations and regular meals.     Discussed Amphetamine use with the pt and he continues to deny any use, states that he has never used any illicit substances. Believes that his drug test was positive because people around him smoke Methamphetamine    Physical Exam   Vital Signs: Temp: 98.4  F (36.9  C) Temp src: Oral BP: 131/86 Pulse: 80   Resp: 14 SpO2: 98 % O2 Device: None (Room air)    Weight: 155 lbs 0 oz    General: Alert. Laying in bed comfortably. NAD. Well developed " and nourished.  HEENT: NC, AT, MMM, EOMI, Clear conjunctivae, normal oropharynx  Cardiovascular: RRR, No murmurs, rubs, or gallops. Normal S1 and S2.  Pulmonary: CTAB, No wheezes/rhonchi/rales, Normal inspiratory effort  Abdominal: Soft, non-distended, non-tender. Normal bowel sounds.  Extremities: No swelling or erythema. FAROM.   Skin: Clean, warm, dry, intact  Neuro: A&Ox4, Speech is normal, CN not tested but appear grossly intact. Moving all extremities.     Medical Decision Making           Data     I have personally reviewed the following data over the past 24 hrs:    N/A  \   N/A   / N/A     140 108 (H) 9.7 /  81   3.6 25 0.75 \       Imaging results reviewed over the past 24 hrs:   No results found for this or any previous visit (from the past 24 hours).

## 2024-10-24 NOTE — PROGRESS NOTES
Transfer  Transferred to:  1833  Via:bed  Reason for transfer: Pt admitted from ED.  Family: Aware of transfer  Belongings: Packed and sent with pt  Chart: Delivered with pt to next unit  Medications: Meds sent to new unit with pt  Report given to: MEGHAN RN  Pt status: Stable

## 2024-10-24 NOTE — ED NOTES
Pt request medication for abdominal pain. Paged and notified the provider. 5mg of oxycodone administered, pt refused oxy and requested IV pain medication. MD ordered 0.2mg of dilaudid. This RN administered the 0.2mg of dilaudid, pt claims his pain medication wasn't not administered because he can't feel anything. I explained to him that his medication administered, but he still claims that it wasn't.

## 2024-10-24 NOTE — PLAN OF CARE
[id number]. A&Ox4. VSS on RA. Denied pain and nausea. Up ad lexi. Went outside to smoke. Reg diet, ordered dinner tray. Snacking on crackers and pudding before tray arrival. No BM.  - sliding scale.given. PIV saline locked. NPO @ 0000 for MR Enterography.     Goal Outcome Evaluation:      Plan of Care Reviewed With: patient    Overall Patient Progress: no change

## 2024-10-25 LAB
ANION GAP SERPL CALCULATED.3IONS-SCNC: 9 MMOL/L (ref 7–15)
BUN SERPL-MCNC: 11.5 MG/DL (ref 8–23)
CALCIUM SERPL-MCNC: 9 MG/DL (ref 8.8–10.4)
CHLORIDE SERPL-SCNC: 102 MMOL/L (ref 98–107)
CREAT SERPL-MCNC: 0.79 MG/DL (ref 0.67–1.17)
CREAT SERPL-MCNC: 0.79 MG/DL (ref 0.67–1.17)
EGFRCR SERPLBLD CKD-EPI 2021: >90 ML/MIN/1.73M2
EGFRCR SERPLBLD CKD-EPI 2021: >90 ML/MIN/1.73M2
GLUCOSE BLDC GLUCOMTR-MCNC: 229 MG/DL (ref 70–99)
GLUCOSE BLDC GLUCOMTR-MCNC: 248 MG/DL (ref 70–99)
GLUCOSE BLDC GLUCOMTR-MCNC: 268 MG/DL (ref 70–99)
GLUCOSE BLDC GLUCOMTR-MCNC: 303 MG/DL (ref 70–99)
GLUCOSE BLDC GLUCOMTR-MCNC: 365 MG/DL (ref 70–99)
GLUCOSE BLDC GLUCOMTR-MCNC: 384 MG/DL (ref 70–99)
GLUCOSE BLDC GLUCOMTR-MCNC: 384 MG/DL (ref 70–99)
GLUCOSE SERPL-MCNC: 395 MG/DL (ref 70–99)
HCO3 SERPL-SCNC: 24 MMOL/L (ref 22–29)
HOLD SPECIMEN: NORMAL
POTASSIUM SERPL-SCNC: 3.9 MMOL/L (ref 3.4–5.3)
SODIUM SERPL-SCNC: 135 MMOL/L (ref 135–145)

## 2024-10-25 PROCEDURE — 80359 METHYLENEDIOXYAMPHETAMINES: CPT

## 2024-10-25 PROCEDURE — 82435 ASSAY OF BLOOD CHLORIDE: CPT

## 2024-10-25 PROCEDURE — 250N000011 HC RX IP 250 OP 636

## 2024-10-25 PROCEDURE — 99255 IP/OBS CONSLTJ NEW/EST HI 80: CPT | Performed by: STUDENT IN AN ORGANIZED HEALTH CARE EDUCATION/TRAINING PROGRAM

## 2024-10-25 PROCEDURE — 80048 BASIC METABOLIC PNL TOTAL CA: CPT

## 2024-10-25 PROCEDURE — 99232 SBSQ HOSP IP/OBS MODERATE 35: CPT | Mod: GC | Performed by: STUDENT IN AN ORGANIZED HEALTH CARE EDUCATION/TRAINING PROGRAM

## 2024-10-25 PROCEDURE — 99418 PROLNG IP/OBS E/M EA 15 MIN: CPT | Performed by: STUDENT IN AN ORGANIZED HEALTH CARE EDUCATION/TRAINING PROGRAM

## 2024-10-25 PROCEDURE — 36415 COLL VENOUS BLD VENIPUNCTURE: CPT

## 2024-10-25 PROCEDURE — 250N000013 HC RX MED GY IP 250 OP 250 PS 637

## 2024-10-25 PROCEDURE — 120N000002 HC R&B MED SURG/OB UMMC

## 2024-10-25 RX ORDER — DEXTROSE MONOHYDRATE 25 G/50ML
25-50 INJECTION, SOLUTION INTRAVENOUS
Status: DISCONTINUED | OUTPATIENT
Start: 2024-10-25 | End: 2024-10-26 | Stop reason: HOSPADM

## 2024-10-25 RX ORDER — NICOTINE POLACRILEX 4 MG
15-30 LOZENGE BUCCAL
Status: DISCONTINUED | OUTPATIENT
Start: 2024-10-25 | End: 2024-10-26 | Stop reason: HOSPADM

## 2024-10-25 RX ORDER — HYDROMORPHONE HCL IN WATER/PF 6 MG/30 ML
0.2 PATIENT CONTROLLED ANALGESIA SYRINGE INTRAVENOUS ONCE
Status: COMPLETED | OUTPATIENT
Start: 2024-10-25 | End: 2024-10-25

## 2024-10-25 RX ADMIN — ENOXAPARIN SODIUM 40 MG: 40 INJECTION SUBCUTANEOUS at 04:43

## 2024-10-25 RX ADMIN — INSULIN ASPART 3 UNITS: 100 INJECTION, SOLUTION INTRAVENOUS; SUBCUTANEOUS at 01:17

## 2024-10-25 RX ADMIN — BICTEGRAVIR SODIUM, EMTRICITABINE, AND TENOFOVIR ALAFENAMIDE FUMARATE 1 TABLET: 50; 200; 25 TABLET ORAL at 10:53

## 2024-10-25 RX ADMIN — PANTOPRAZOLE SODIUM 40 MG: 40 TABLET, DELAYED RELEASE ORAL at 17:26

## 2024-10-25 RX ADMIN — PANTOPRAZOLE SODIUM 40 MG: 40 TABLET, DELAYED RELEASE ORAL at 10:53

## 2024-10-25 RX ADMIN — INSULIN ASPART 3 UNITS: 100 INJECTION, SOLUTION INTRAVENOUS; SUBCUTANEOUS at 04:43

## 2024-10-25 RX ADMIN — HYDROMORPHONE HYDROCHLORIDE 0.2 MG: 0.2 INJECTION, SOLUTION INTRAMUSCULAR; INTRAVENOUS; SUBCUTANEOUS at 21:53

## 2024-10-25 ASSESSMENT — ACTIVITIES OF DAILY LIVING (ADL)
DEPENDENT_IADLS:: INDEPENDENT
ADLS_ACUITY_SCORE: 0

## 2024-10-25 NOTE — PLAN OF CARE
Goal Outcome Evaluation:      Plan of Care Reviewed With: patient    Overall Patient Progress: no changeOverall Patient Progress: no change    Outcome Evaluation: Care management team consulted d/t elevated readmission risk score. Anticipate once med ready, pt will return to the streets. Care management team will sign off as pt reporting he has no new needs.    RADHA Kamara, LICSW

## 2024-10-25 NOTE — DISCHARGE INSTRUCTIONS
MRE Imaging Findings of possible IPMN    For the 2mm lesion that was found on your pancreas the guidelines are as below:    *  Less than 20 mm: Follow-up imaging in 6 months once, then every 18  months if no change. Stop surveillance if stable for 5 years.    Families and single adults in need of emergency shelter  Call [facility] - [phone]    [facility] serves [facility] residents who are seeking emergency shelter by helping them find a safe, temporary place to stay while they work on next steps toward stable housing.    Hotline hours:  Monday - Friday: 8 a.m. to 10 p.m.  Saturday - Sunday and holidays: 1 p.m. to 9 p.m.  Callers outside of these hours will be directed to [employer]'s 2-1-1    Get a community card  A community card can be scanned by emergency shelters and drop-in centers to make access quicker. If you re staying at [facility], they can print a community card onsite. Otherwise, community cards can be printed at:    Adult Shelter Connect at the [facility]     Map: [address] [facility]  Monday  11 a.m. to 2 p.m.      [facility]   Map: [facility] [facility]  Monday to Wednesday  9 a.m. to 11:45 p.m.    [facility]  Map: [address] [place] [address]  Monday to Friday  8 a.m. to 11 a.m. and  12:30 p.m. to 2:30 p.m.    Resources for People who are Unhoused [place]*    FREE MEALS    BREAKFAST:     Branch III  [address] / [phone]   Serves breakfast Monday - Saturday 7:00-8:00 AM    [facility] [address] / [phone]   Serves women and children Monday - Friday 8:30-9:00 AM (men with children welcome)     [facility] (Kate leonard)  [address] [place] [address] / [phone]   Serves breakfast Monday - Thursday at 10:00 AM and on Saturday and Sunday at 9:30 AM.      LUNCH & SNACKS:     [facility]anch [facility]  [address] [phone]  Lunch: Monday - Saturday 11:30 - 12:30 PM  Drop In Hours: Monday - Saturday 7:00 AM to   3:00 PM. Citizen of [place] speaking staff. Showers, laundry and lockers available. Lockers are $1 for 30 days. After 30 days you must wait 3 months to rent a locker again.     [facility]  [address] [facility] [phone]   Lunch is served Monday - Friday 12:00-1:00 PM and Saturday and Sunday 10:30-11:30 AM.     [facility]  [address] [phone]   Serves women lunch on Thursdays at 11:45 AM    [facility]   [address] [phone]   Provides a gathering place where poor, homeless and economically comfortable people come to form community, work for non-violence, affirmation and responsibility. Emphasis is on spirituality through daily reflection time together, followed by lunch. Lunch is served Monday - Friday at 11:45 AM.     [facility]   [address] [facility] [phone]   Bag lunches and snacks available. Warm space is open for young people on Mondays 4:30-8:00 PM. Warm space is open to all on Thursdays 12-4:00 PM.     [facility] (Kate leonard)  [address] [phone]   Serves lunch Monday - Thursday at 12:00 PM and on Saturday and Sunday at 11:00 AM.     [facility]  [address] [place] [address] [phone]   Lunch is served Monday - Friday 11:00-12:30 PM     [facility]  [address] [phone]   Tickets are issued on Sunday at 10:30 AM and doors open at 11:30 AM. Meal is served at 12:00 PM.    [facility]  1010 [facility] [phone]   A nice lunch is served on Sundays after chapel, which begins at 10:00 AM. You must attend chapel to go to the meal.     [facility]   5547  [facility] / [phone]   Dinner is served on Sundays, October - April from 2:30-4:30 PM. Doors open at 1:30 PM.     DINNER:     [facility]   [address] [facility] / [phone]   Dinner is served Wednesdays 5:45-6:30 PM    [facility]  1010 [facility] / [phone]   Dinner is served 7 days a week at 6:00 PM (usually soup and sandwiches).     [facility]  1112 [facility] / [phone]   Tuesday, Friday & Saturday dinner is served after a 7:00 PM service. On the second Tuesday of the month there is a dinner at 6:00 PM.     Javi and Narayan Gonsalez  ** All sites serve Monday-Friday 5:30-6:30 PM except as noted.   [facility]: [phone]  2200 [facility] Shinto: [phone]  [facility] [address] [place] [address]   ** Serves Monday - Thursday 5:15-6:15 PM     [facility]: [phone]  [facility]: [phone]   [facility]: [phone]   [facility] [facility]   ** Serves Tuesday and Thursday 5:00-6:00 PM     [facility] (Kate leonard)  [address]   [phone]   Dinner is served Monday - Thursday at 4:00 PM.     [facility]   [address] [place] [address]  [phone]  Dinner is served on Monday at 6:00 PM for people under the age of 30.     These Sunday meals are served 5:00-6:00 PM:  ** First and Fifth Sundays of the Month  [facility]   ** Second Sunday of the Month  [facility]   [address] [place] [address]   ** Third Sunday of the Month  [place] [facility]  [address]   ** Fourth Sunday of the Month   [facility] [address] at [address]     MEN & WOMEN  SHELTERS    [facility]   [address]  [phone] ()  Intake: You must be on [place] or SSI or you can be referred by a [facility] Access Worker, financial worker,  or other sort of counselor.   Remarks: The [place] [facility]ell[facility] is a structured housing program for people with special needs who are vulnerable. There is no set limit for how long you can stay (depends on what your situation is).     [facility]  [address] [place] [phone]   Emergency Housing: Provides shelter, personal hygiene items and nutrition for homeless single adults who are receiving [place], SSI or other government benefits.   Jean Program: Provides shelter, personal hygiene and nutrition for able-bodied adults who are employed or actively seeking employment. There is an $8 per day co-pay to stay there.     MEN ONLY SHELTERS    [facility]  [address] [phone]   Single men without children who are not eligible for [facility] may stay on a mat in a secure area. Open 7 days a week from 5:00 PM to 7:00 AM. A light dinner is served. Restrooms and showers are available. Beds upstairs are available for $4 per night.     [facility]  [address] [phone]   [facility]: A secure shelter for single homeless men. Beds are available first come, first serve each night.   Spiritual Growth Program: Provides shelter, personal hygiene items and nutrition for men who are seeking a rejuvenation of mind, body and spirit.   [facility]: Residential substance abuse treatment program. Phase 1 is a 21 day program. Phase 2 is a 75 day penitentiary house program.     [facility]   [address] [facility]  [phone]   Hours: 6:00 PM to 7:00 AM  Provides 43 beds that are available for stays up to 28 days. People who work late and have a 28 day spot can  come to the shelter late. You can get an extended stay by saving 40% of your income towards housing. If you have never been to the lottery before, you can talk to shelter staff to be put on the waiting list. Breakfast, showers, weekend meals and laundry are available. Regular medical services are offered. [facility] Access workers make regular visits and staff members speak [place] Islander. Job preparation and search program is available. You must be completely sober to stay at [facility]. See lottery procedure.    [facility]   [address] [facility]  [phone]   Hours: 6:00 PM to 7:00 AM  Provides 34 beds that are available for 28 days (some extensions are possible). Breakfast, dinner, showers, laundry, and an address to receive mail are available. Building is wheelchair accessible. Job preparation and search program is available. See lottery procedure.    [address]   [phone] (day); [phone] (3-5pm, after 6pm)   Hours: 6:00 PM to 7:00 AM. Office hours are   Monday - Friday 3:00-5:00 PM.  Provides 46 beds that are available for stays up to 28 days. Dinner and simple breakfast is served. Showers, laundry, storage, savings, health clinic and advocacy are available. There is legal clinic that is held on Mondays. Extended stays may be available for those who are willing to save 40% of their income for permanent housing and have pay stubs to verify employment. Job preparation and search program is available. See lottery procedure.     Lottery Procedure for [facility], [facility] and [facility]:  Drawings for available beds are held at the [facility], [address] [place] [address] on Monday nights at 6:30 PM. You must be signed up for the lottery before 6:30 PM. You must be sober to participate in the lottery, meaning no alcohol or drug use prior to the drawing. There will be no 1 night beds given out  through individual lotteries. On Mondays a waiting list will be chosen for each shelter. People on the waiting list may receive 1 night or 28 nights depending on which shelter s list you are on. People on the waiting list should contact their respective shelter each day to see if beds area available.      SHOWERS     Branch III  [address]   [phone]  Showers and laundry are available Monday-Friday 7:00-11:00 AM and 1:00-2:30 PM.     [facility]  [address]  [phone]  Showers are available Monday-Friday 9:00-1:00 PM.    [facility]  1010 [facility]      Showers are available 7 days a week, 8:00-4:30 PM.     [facility] (Kate leonard)  [address]   [phone]  Showers are available:  Monday-Thursday 10:30-11:20 AM AND 1:30-3:00 PM, Saturday 9:30-10:30 AM, Sunday 10:30-11:00 AM     *All information subject to change without notice. This is not a complete list of community resources.       ______________________    Mental Health Crisis Resources*     Refer to the resources below as needed.     Steps to care for yourself     If you are currently in counseling, call your counselor for an appointment  Call the local crisis resources below if needed.  Contact friends or family for support.  Get more exercise.  Do activities you enjoy.  Eat a well-balanced diet and drink plenty of fluids.  Rest as needed.  Limit alcohol and recreational drugs. These can worsen depression.  Properly store or remove fire arms.   Try some coping skills (see below)    Grounding Techniques:  Try to notice where you are, your surroundings including the people, the sounds like the TV or radio.  Concentrate on your breathing. Take a deep cleansing breath from your diaphragm. Count the breaths as you exhale. Make sure you breath slowly.  Hold something that you find comforting, for some it may be a stuffed animal or a blanket. Notice  how it feels in your hands. Is it hard or soft?  During a non-crisis time make a list of positive affirmations. Print them out and keep them handy for times of intense anxiety. At those times, read them aloud.    Try the [employer] game:  Name 5 things you can see in the room with you  Name 4 things you can feel ( chair on my back  or  feet on floor )   Name 3 things you can hear right now ( people talking  or  tv )   Name 2 things you can smell right now (or, 2 things you like the smell of)   Name 1 good thing about yourself    Create A Safe Place:  Image a safe place -- it can be a real or imaginary place:   What do you see -- especially colors?   What sounds do you hear?   What sensations do you feel?   What smells do you smell?   What people or animals would you want in your safe place?   Imagine a protective bubble, wall or boundary around your safe place.   Imagine a door or gate with a guard at your safe place.   Image a lock and key to your safe place and only you can unlock it.  You can draw or make a collage that represents your safe place.   Choose a souvenir of your safe place -- a color, an object, a song.   Keep your image of your safe place so you can come back to it when you need to.     When to contact your primary care provider      You have thoughts of harming or killing yourself but have not made a plan to carry it out.  Your depression gets in the way of daily activities.  You are often unable to sleep.  You need help cutting back on alcohol or recreational drugs.     When to call 911 or go to the Emergency Room      Get emergency help right away if you have any of the following:  You are planning to harm or kill yourself and you have a way to carry out the plan.   You have injured yourself or others. Or, you think you will.  You feel confused or are having trouble thinking or remembering.  You are having delusions (false beliefs).  You are hearing voices or seeing things that aren t there.  You are  feeling psychotic (paranoid, fearful, restless, agitated, nervous, racing thoughts or speech)     Crisis Resources     The EmPath is an adults only unit located at [facility] in [place] is a short term (generally less than 23 hour stay) designed for crisis intervention and stabilization. Pts have the opportunity to meet quickly with a behavioral health team for evaluation in a calm and peaceful therapuetic environment. To be evaluated for admission patients are tri[facility] thr[facility].      The following hotlines are for both adults and children. The and are open 24 hours a day, 7 days a week unless noted otherwise.     Crisis Lines        Crisis Text Line  Text 742383  You will be connected with a trained live crisis counselor to provide support.      Gambling Hotline  2.186.113.2731 [hope]      línea de crisis española  [phone]      [facility] Helpline  [phone]      National Hope Line  2.714.377.7970 [hope]      National Suicide Prevention Lifeline  Free and confidential support  988 or 1.658.108.TALK [8255]  http://suicidepreventionlifeline.org      The Randall Project (LGBTQ Youth Crisis Line)  4.407.702.0616  text START to 323-998      [place] Crisis Line  7.304.668.7105 (Press 1)  or text 095249    [facility] Mental Health Crisis Response  Within [place], call **CRISIS [**026702] to be connected to a mental health professional who can assist you.        [facility] Crisis  [phone]      [facility] Mobile Crisis  [phone]      [facility] Crisis  [phone]      [facility] Mobile Crisis  [phone] (adults)  [phone] (children)      [facility] Mobile Crisis  [phone] (adults)  [phone] (children)      [facility] Mobile Crisis  [phone]      [facility] Mobile Crisis  [phone]    Community Resources        Fast Tracker  Linking people to mental health and substance use disorder resources   fastlivierckermn.org      [place] Mental Health Warmline  Peer to peer support  5 pm to 9 am 7 days/week  0.882.493.6200  https://mnwitw.org/francisco j      National [place] on Mental Illness (BRIDGET)  242.840.6855 or 1.888.BRIDGET.HELPS  https://namimn.org/      [facility]  [address]  [phone]      Walk-in Counseling Center  Free mental health counseling  https://walkin.org/  612.870.0565 X2    Mental Health Apps        Calm Harm  https://NotesFirst.co.uk/      My3  https://CreaWorpp.org/      Tamera Safety Plan  https://www."DeansList[employer] Inc."lan.org/         _________________________________________________________________________________________________________________________    Chemical Health Resources*    Refer to the resources below as needed.    Comprehensive Assessment (formerly known as Rule 25)    To access chemical dependency treatment you must have a Comprehensive Assessment complete or have an updated assessment within 30 days of starting any program. If you have medical assistance or no insurance, information to secure funding and get assessed can be found through your [facility]'s eJamming health intake line. If you have private insurance, contact the customer service number on the back of your insurance card to determine the required steps for accessing services through your insurance provider. Once approved for funding you can connect with a facility that does Rule 25 assessment. Treatment locations can be discussed with a Rule 25 , known as a Licensed Drug and Alcohol Counselor (LADC). They will send the completed Rule 25 to the treatment facility of choice.    [facility] [employer] Health Intake Lines:  [facility] [phone]  [place] - [phone]  [facility] [phone]  [place] - [phone]  [place] - [phone]  [place] - [phone]  [place] - [phone]  [place] - [phone]  [place]  - [phone]    Payment plugin [place]  To schedule a Chemical Dependency Assessment at any one of our five outpatient CD clinics ([place], [place], [place], [place] or [facility]) call [phone] to speak to a . Go to: https://www.GeneCentric Diagnostics.org/categories/Mental-Health-and-Addiction-Care    Several additional local facilities that offer Comprehensive Assessments:     [facility] Addiction   [address] [facility] [address] SUDS  [phone]  [address]  [place] [address]     [facility]   [phone]    [facility]  [address]  [facility] [address]     [facility] [phone]  [address] [facility] [address]     [facility]  [phone] - press 1  Multiple clinic locations     [facility]   [phone]  [address]  [facility] [address]     [facility]  [phone]  [address] [facility] [address]     [facility] ([facility])  [phone]  [address] [facility] [address]     [place]  [phone] [address]  [place] [address]    Detox:    If you are intoxicated you may be required to detox at a detox facility before starting treatment.     [facility]: [phone]  [facility]: [phone]  [facility]: [phone]    Chemical Health Treatment Options:    Residential Substance Use Treatment:  Daily programming (individual and group) with lodging provided.  Treatment length usually varies from 21 to 30 days in most residential treatment facilities.  Some facilities are 90 days.  The longest residential program is [place] [facility] at 13 months.    Residential treatment can be based on specific populations (LGBTQ+); glenn-based; ethnicities or gender-specific.     Intensive out-patient treatment:  3-5 days/week programming, both individual and group.  May consist of 2  hours each day up to 4 hours a day.  Client can live at home or any other residence.  Some programs partner with sober living residences and may even pay rent towards the sober living residences depending on attendance in their intensive out-patient treatment.  Most are in-person; some are virtual.     Out-patient treatment:  2-4 days/week, both individual and group.  May consist of 2-3 hours a day.  Can live at home or any other residence.  Some programs partner with sober living residences.    Chemical Health Resource/Services Providers:    Fast Tracker  Chemical Health treatment   fasttrackermn.org  [phone]    FindTreatment.gov  Chemical Health Treatment      [facility]  Mental Health and Addiction Services Line: [phone]   [address] [place] [address], [address]  Virtual and In Person options available by appointments     [facility]  [phone]  Mon-Friday: [address] [facility], [address]  Saturday:  [address], [place], [address]  M-F assessments (7a-1:45p); Saturday walk-in assessments (7:45-10:45a) www.INCHRON     [facility]  [phone]  [address] [place], [place], [address]  *available by appointments only / www.PJD Group.org     [employer]  [phone] or [phone] [address] [place] [address], [address]  *walk in assessments available M-F starting at 7 am.     Go Try It On / Phone: [phone] / Locations: [place], [place], [place], [place], [place] and [place] / [email or web address] / Phone: [phone]/ Locations: [place], [place] and [place] / *appointments only / www.[email or web address]      [employer] / [phone] or [phone] / Virtual + Locations: [place], [place], [place], [place], [place], [place], [place], [place], [place], [place], [place], [place], [place],  [place], [place], [place], [place], [place], [place], [place], [place], [place], [place], [facility], [place], [place], [place], [place], [place], [place] / [email or web address]     [facility]/New Beginnings / [phone] or [phone]  Virtual + Locations: [place], [place], [place], [place], [facility], [place], [place], [place]  www.Check I'm Here.[email or web address]     [facility] / [phone] / [address] [facility], [address], [facility] [address] / *appointments only    [facility] / [phone] / Locations: [place] and [place] / www.RML Information Services Ltd.[facility].org     [facility] / [phone] / Locations: [place] / *appointments only / www.[email or web address] PharmaAllenShieldEffect.[facility]     3 R s [facility] / [phone] / [address] [facility] [address] / www.[email or web address].org     [address] [facility] / [phone] / [address] [facility] [address] / www.[email or web address] Hospitals at Curlie.org     Sober Support Resources:    [facility] Connection ([facility]): [facility] connects people seeking recovery to resources that help foster and sustain long-term recovery. Whether you are seeking resources for treatment, transportation, housing, job training, education, health care or other pathways to recovery, [facility] is a great place to start. Phone: [phone]. www.minnesotaNextGame.J&V Big Game Outfitters (Great listing of all types of recovery and non-recovery related resources)     SMART Recovery: https://www.smartrecExeo Entertainmenty.org/     Alcoholics Anonymous: 1-800-ALCOHOL  HTTP://WWW.AA.ORG/  AA [place] (430-090-6536 or http://aaminneapolis.org)  AA [place] (815-812-7042 or www.aastpaul.org)    Substance Abuse and Mental Health Services Administration ([employer]) National Help line: This is a free, confidential, 24/7, 365-day-a-year treatment referral and information service (in English and Macedonian) for individuals and families facing mental and/or substance use  "disorders. Call 5-615-501-HELP    *All information subject to change without notice. This is not a complete list of community resources.         Diabetes Management Team Discharge Recommendations:     Blood Glucose Checks: three times daily before meals and at bedtime via glucometer.    Diabetes Medications:   1) Lantus 28 units once daily at 9AM. *Reduce to 15 units if you think you will not have any food that day to avoid low blood sugar when not eating*    2) Novolog 10 units three times daily with meals (or when you have access to a meal)    Diabetes Outpatient follow up: Follow up with PCP and pharmacist for Medication Therapy Management (Dorie Holland) in 1-2 weeks from discharge.    How to treat a low blood sugar (<70 mg/dL):  You may be experiencing hypoglycemia (low blood sugar) if:  BG less than 70 mg/dL or when feeling symptoms of hypoglycemia such as:  Sweating  Shakiness/Tremors  Increased appetite  Nausea  Dizziness or light-headedness  Sleepiness  Weakness  Rapid heart rate  Headache  Tingling around mouth and tongue    If you are having a low blood sugar, do the following steps: \"RULE OF 15s\"   -Eat 15 grams simple Carbs (1/2 cup orange juice, 4 glucose tablets, 1/2 cup regular soda, packet of fruit snacks)  -Wait 15 minutes  -Test with your blood glucose meter again  -If your blood sugar is above 70, then continue normally  -If still less than 70, repeat above process until over 70  -Call clinic for recurrent low blood glucose (more than 2 a week or 2 a day)     If you have urgent questions or concerns regarding your blood sugars or insulin, you may contact [phone] (the main hospital ). Ask to speak with the endocrinologist on call.    Your target A1c value is less than 7%. Your most recent A1c is 10.5%.     Thank you for letting the Diabetes Management Team be involved in your care!  "
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
## 2024-10-25 NOTE — CONSULTS
Care Management Initial Consult    General Information  Assessment completed with: Patient    Type of CM/SW Visit: Initial Assessment  Primary Care Provider verified and updated as needed: No   Readmission within the last 30 days: lack of support, previous discharge plan unsuccessful   Return Category: Medically unsuccessful treatment plan    Reason for Consult: utilization management concerns  Advance Care Planning: Advance Care Planning Reviewed: no concerns identified        Communication Assessment  Patient's communication style: spoken language (English or Bilingual)    Hearing Difficulty or Deaf: no   Wear Glasses or Blind: no    Cognitive  Cognitive/Neuro/Behavioral: WDL                      Living Environment:   People in home: alone     Current living Arrangements: homeless      Able to return to prior arrangements: yes     Family/Social Support:  Care provided by: self  Provides care for: no one  Marital Status: Single  Support system: None          Description of Support System: Uninvolved    Support Assessment: Difficulty establishing and maintaining relationships, Minimal outside structure and leisure time activities, Limited social contact and support    Current Resources:   Patient receiving home care services: No  Community Resources: None  Equipment currently used at home: none  Supplies currently used at home: Diabetic Supplies    Employment/Financial:  Employment Status: unemployed     Financial Concerns: unemployed   Referral to Financial Worker: No  Does the patient's insurance plan have a 3 day qualifying hospital stay waiver?  No    Lifestyle & Psychosocial Needs:  Social Drivers of Health     Food Insecurity: High Risk (10/24/2024)    Food Insecurity     Within the past 12 months, did you worry that your food would run out before you got money to buy more?: Yes     Within the past 12 months, did the food you bought just not last and you didn t have money to get more?: Yes   Depression: Not at  risk (8/27/2020)    PHQ-2     PHQ-2 Score: 0   Housing Stability: High Risk (10/24/2024)    Housing Stability     Do you have housing? : No     Are you worried about losing your housing?: Yes   Tobacco Use: High Risk (10/14/2024)    Patient History     Smoking Tobacco Use: Some Days     Smokeless Tobacco Use: Former     Passive Exposure: Not on file   Financial Resource Strain: High Risk (10/24/2024)    Financial Resource Strain     Within the past 12 months, have you or your family members you live with been unable to get utilities (heat, electricity) when it was really needed?: Yes   Alcohol Use: Unknown (8/27/2020)    AUDIT-C     Frequency of Alcohol Consumption: Not on file     Average Number of Drinks: Patient declined     Frequency of Binge Drinking: Not on file   Transportation Needs: High Risk (10/24/2024)    Transportation Needs     Within the past 12 months, has lack of transportation kept you from medical appointments, getting your medicines, non-medical meetings or appointments, work, or from getting things that you need?: Yes   Physical Activity: Not on file   Interpersonal Safety: High Risk (10/24/2024)    Interpersonal Safety     Do you feel physically and emotionally safe where you currently live?: No     Within the past 12 months, have you been hit, slapped, kicked or otherwise physically hurt by someone?: No     Within the past 12 months, have you been humiliated or emotionally abused in other ways by your partner or ex-partner?: No   Stress: Not on file   Social Connections: Unknown (4/10/2023)    Received from [facility] [employer] [facility], [facility] [employer] [facility]    Social Connections     Frequency of Communication with Friends and Family: Not on file   Health Literacy: Not on file     Functional Status:  Prior to admission patient needed assistance:   Dependent ADLs:: Independent  Dependent IADLs:: Independent  Assesssment of Functional Status: At functional  "baseline    Mental Health Status:  Mental Health Status: No Current Concerns       Chemical Dependency Status:  Chemical Dependency Status: Current Concern           Values/Beliefs:  Spiritual, Cultural Beliefs, Cheondoism Practices, Values that affect care: no             Discussed  Partnership in Safe Discharge Planning  document with patient/family: No    Additional Information:  Per chart review, RNCC Valeria Otero attempted to complete the care management assessment on 10/24 at 1112 hours, however the pt refused.    Care management team consulted d/t elevated unplanned hospital readmission risk score (SHIKHA). Chart reviewed; pt familiar to this writer. This writer met with patient at bedside to complete the care management assessment. Writer introduced self and the role of the SW & RNCC. SW verified address (pt is unhoused), PCP (pt has no PCP & refuses to be set up with one), emergency contacts (none), and health insurance ([facility] PMAP & [employer]ARE).     This patient remains unhoused and reports living on \"the streets\". He has insurance, but no other public assistance, services, workers, or sources of income. He reported being aware of community resources to meet his basic needs.He has had success in CD treatment in the past, however is currently using methamphetamines and not interested in treatment.     Per pt request, this writer emailed [email or web address] to attempt to obtain the contact information for Briseyda Hammer, Unit Supervisor of Homeless to [facility] for [facility] as the pt feels she could provide him more resources than the general shelter connect contacts. Writer also placed unhoused, mental health, and chemical health resources in pt's AVS discharge instructions.    SW provided means of contacting the unit care management team & encouraged pt to reach out with any needs, questions, or concerns.     NEXT: Pt denied any additional needs from the care management team. Care management team will " sign off. Please re-consult us if new needs arise during this admission.  __________________________     RADHA Kamara, JOMAR  Clinical , [facility]-  Desk Phone: [phone]   Securely message with [employer] (Search Name or  Med Surg [phone] )  Text page via [facility] Paging/Directory   See signed in provider for up to date coverage information  Social Work & Care Management Department

## 2024-10-25 NOTE — PLAN OF CARE
Goal Outcome Evaluation: Assumed cares [phone]. Pt A&Ox4, VSS on RA, and able to make needs known. Pt denies pain and nausea. Voiding spontaneously. Up independently. BG checks were 384 x2, MD aware. NPO @0000 for MR enterography today. Continue with plan of care.       Plan of Care Reviewed With: patient    Overall Patient Progress: no changeOverall Patient Progress: no change

## 2024-10-25 NOTE — CONSULTS
Discharge Pharmacy Test Claim    [facility] covers one CGM reader per year. Next refill of dexcom G6 allowed 2/7/2025. Patient has been filling one sensor at a time at the [facility] pharmacy. See below for next fill dates for Dexcom supplies. Patient is able to fill traditional glucometer supplies with $0 copays.    Test Claim Copay Notes   dexcom G6 reader 0.00 refillable 2/7/25   dexcom G6 sensor 0.00 refillable 11/9/24   dexcom G6 transmitter 0.00 refillable 12/30/24   dexcom G7  PA required   dexcom G7 sensor 0.00 refillable 11/9/24   accu-chek guide 0.00    contour next 0.00      Lucy Martin  [facility] Pharm[facility] Liaison, [place]  Ph: [phone]  Fax: [phone]  Available on Teams and Vocera

## 2024-10-25 NOTE — PLAN OF CARE
"BP (!) 140/75 (BP Location: Left arm)   Pulse 77   Temp 98  F (36.7  C) (Oral)   Resp 16   Ht 1.626 m (5' 4\")   Wt 70.3 kg (155 lb)   SpO2 95%   BMI 26.61 kg/m      Care from: [id number]    VS & Pain: VSS, no pain reported; BG ACHS + 0200  Neuro: A+Ox4  Respiratory: WDL, RA  Cardiac: WDL  Peripheral neurovascular: WDL  GI/: WDL, uses bathroom appropriately  Nutrition: high carb consistent 75g and carb coverage; NPO at midnight  Skin: WDL  Lines: L PIV:SL  Activity: Independent  Events this shift: refuses bed alarm- pt is steady; MR scan changed to tomorrow; new insulin orders placed     Plan:  MR enterography tomorrow at 10am: will need to start contrast intake at 9am  "

## 2024-10-26 ENCOUNTER — APPOINTMENT (OUTPATIENT)
Dept: MRI IMAGING | Facility: CLINIC | Age: 59
End: 2024-10-26
Payer: COMMERCIAL

## 2024-10-26 ENCOUNTER — APPOINTMENT (OUTPATIENT)
Dept: GENERAL RADIOLOGY | Facility: CLINIC | Age: 59
End: 2024-10-26
Payer: COMMERCIAL

## 2024-10-26 VITALS
BODY MASS INDEX: 26.46 KG/M2 | WEIGHT: 155 LBS | DIASTOLIC BLOOD PRESSURE: 82 MMHG | SYSTOLIC BLOOD PRESSURE: 148 MMHG | TEMPERATURE: 98.5 F | HEIGHT: 64 IN | OXYGEN SATURATION: 99 % | RESPIRATION RATE: 16 BRPM | HEART RATE: 81 BPM

## 2024-10-26 LAB
ANION GAP SERPL CALCULATED.3IONS-SCNC: 10 MMOL/L (ref 7–15)
BUN SERPL-MCNC: 18.3 MG/DL (ref 8–23)
CALCIUM SERPL-MCNC: 9.2 MG/DL (ref 8.8–10.4)
CHLORIDE SERPL-SCNC: 98 MMOL/L (ref 98–107)
CREAT SERPL-MCNC: 0.68 MG/DL (ref 0.67–1.17)
EGFRCR SERPLBLD CKD-EPI 2021: >90 ML/MIN/1.73M2
GLUCOSE BLDC GLUCOMTR-MCNC: 290 MG/DL (ref 70–99)
GLUCOSE BLDC GLUCOMTR-MCNC: 334 MG/DL (ref 70–99)
GLUCOSE BLDC GLUCOMTR-MCNC: 337 MG/DL (ref 70–99)
GLUCOSE SERPL-MCNC: 316 MG/DL (ref 70–99)
HCO3 SERPL-SCNC: 23 MMOL/L (ref 22–29)
PLATELET # BLD AUTO: 313 10E3/UL (ref 150–450)
POTASSIUM SERPL-SCNC: 3.9 MMOL/L (ref 3.4–5.3)
SODIUM SERPL-SCNC: 131 MMOL/L (ref 135–145)

## 2024-10-26 PROCEDURE — 72197 MRI PELVIS W/O & W/DYE: CPT | Mod: 26 | Performed by: RADIOLOGY

## 2024-10-26 PROCEDURE — 74183 MRI ABD W/O CNTR FLWD CNTR: CPT | Mod: 26 | Performed by: RADIOLOGY

## 2024-10-26 PROCEDURE — 80048 BASIC METABOLIC PNL TOTAL CA: CPT

## 2024-10-26 PROCEDURE — 99232 SBSQ HOSP IP/OBS MODERATE 35: CPT | Performed by: STUDENT IN AN ORGANIZED HEALTH CARE EDUCATION/TRAINING PROGRAM

## 2024-10-26 PROCEDURE — 36415 COLL VENOUS BLD VENIPUNCTURE: CPT

## 2024-10-26 PROCEDURE — A9585 GADOBUTROL INJECTION: HCPCS

## 2024-10-26 PROCEDURE — 250N000011 HC RX IP 250 OP 636

## 2024-10-26 PROCEDURE — 74018 RADEX ABDOMEN 1 VIEW: CPT

## 2024-10-26 PROCEDURE — 85049 AUTOMATED PLATELET COUNT: CPT

## 2024-10-26 PROCEDURE — 250N000013 HC RX MED GY IP 250 OP 250 PS 637

## 2024-10-26 PROCEDURE — 72197 MRI PELVIS W/O & W/DYE: CPT

## 2024-10-26 PROCEDURE — 74018 RADEX ABDOMEN 1 VIEW: CPT | Mod: 26 | Performed by: RADIOLOGY

## 2024-10-26 PROCEDURE — 99238 HOSP IP/OBS DSCHRG MGMT 30/<: CPT | Mod: GC | Performed by: STUDENT IN AN ORGANIZED HEALTH CARE EDUCATION/TRAINING PROGRAM

## 2024-10-26 PROCEDURE — 255N000002 HC RX 255 OP 636

## 2024-10-26 RX ORDER — HYDROMORPHONE HCL IN WATER/PF 6 MG/30 ML
0.2 PATIENT CONTROLLED ANALGESIA SYRINGE INTRAVENOUS
Status: COMPLETED | OUTPATIENT
Start: 2024-10-26 | End: 2024-10-26

## 2024-10-26 RX ORDER — PANTOPRAZOLE SODIUM 40 MG/1
40 TABLET, DELAYED RELEASE ORAL
Qty: 60 TABLET | Refills: 0 | Status: ON HOLD | OUTPATIENT
Start: 2024-10-26

## 2024-10-26 RX ORDER — GADOBUTROL 604.72 MG/ML
7.5 INJECTION INTRAVENOUS ONCE
Status: COMPLETED | OUTPATIENT
Start: 2024-10-26 | End: 2024-10-26

## 2024-10-26 RX ORDER — PANTOPRAZOLE SODIUM 40 MG/1
40 TABLET, DELAYED RELEASE ORAL
Qty: 30 TABLET | Refills: 0 | Status: CANCELLED | OUTPATIENT
Start: 2024-10-26

## 2024-10-26 RX ORDER — SENNOSIDES 8.6 MG
2 TABLET ORAL ONCE
Status: COMPLETED | OUTPATIENT
Start: 2024-10-26 | End: 2024-10-26

## 2024-10-26 RX ORDER — POLYETHYLENE GLYCOL 3350 17 G/17G
17 POWDER, FOR SOLUTION ORAL ONCE
Status: COMPLETED | OUTPATIENT
Start: 2024-10-26 | End: 2024-10-26

## 2024-10-26 RX ORDER — SENNOSIDES 8.6 MG
1 TABLET ORAL DAILY PRN
Qty: 30 TABLET | Refills: 1 | Status: ON HOLD | OUTPATIENT
Start: 2024-10-26

## 2024-10-26 RX ADMIN — GADOBUTROL 7 ML: 604.72 INJECTION INTRAVENOUS at 11:51

## 2024-10-26 RX ADMIN — POLYETHYLENE GLYCOL 3350 17 G: 17 POWDER, FOR SOLUTION ORAL at 13:17

## 2024-10-26 RX ADMIN — PANTOPRAZOLE SODIUM 40 MG: 40 TABLET, DELAYED RELEASE ORAL at 09:15

## 2024-10-26 RX ADMIN — HYDROMORPHONE HYDROCHLORIDE 0.2 MG: 0.2 INJECTION, SOLUTION INTRAMUSCULAR; INTRAVENOUS; SUBCUTANEOUS at 03:06

## 2024-10-26 RX ADMIN — ENOXAPARIN SODIUM 40 MG: 40 INJECTION SUBCUTANEOUS at 09:16

## 2024-10-26 RX ADMIN — SENNOSIDES 8.6 MG: 8.6 TABLET, FILM COATED ORAL at 09:15

## 2024-10-26 RX ADMIN — BICTEGRAVIR SODIUM, EMTRICITABINE, AND TENOFOVIR ALAFENAMIDE FUMARATE 1 TABLET: 50; 200; 25 TABLET ORAL at 09:15

## 2024-10-26 RX ADMIN — SENNOSIDES 2 TABLET: 8.6 TABLET, FILM COATED ORAL at 13:17

## 2024-10-26 ASSESSMENT — ACTIVITIES OF DAILY LIVING (ADL)
ADLS_ACUITY_SCORE: 0

## 2024-10-26 NOTE — PLAN OF CARE
Goal Outcome Evaluation:      Plan of Care Reviewed With: patient    Overall Patient Progress: no changeOverall Patient Progress: no change    Outcome Evaluation: Pt A&Ox4, VSS on RA. Pain 8-9/10 improved with IV dilaudid 0.2mg given 2x. LPIV saline locked. NPO at midnight. Refused AM labs and vitals. Up to bathroom independently, voiding spontaneously. No acute changes overnight, pt able to make needs known. Calls appropriately. Continue with POC, has MR enterography at 10am.

## 2024-10-26 NOTE — PROGRESS NOTES
Inpatient Diabetes Management Service: Daily Progress Note     HPI: 57 y/o M with pmhx of HIV, Esophagitis, poorly controlled DM2, TOMMY, h/o SBO, and difficult social situation presents for LUQ. Has had prior admission (10/7-10/10) with concern for gastric outlet obstruction (GOO) and recurrent small bowel obstruction. Multiple additional ER visits since then for similar symptoms. Similar to prior pains that he has had with SBO in the past. Imaging was negative for SBO, It did show severely dilated stomach and there was concern for GOO however this is felt to be unlikely given prior EGD (10/10/24) not showing GOO. He is admitted 10/22/2024 for management of this recurrent abd pain and n/v.          Assessment/Plan:     Assessment:   Type 2 Diabetes Mellitus, complicated by hyperglycemia, peripheral neuropathy, ?gastroparesis, . Poor control, A1c 10.5% (10/23/24)  Abdominal pain, n/v, ?gastroparesis   Methamphetamine use disorder  Food and housing insecurities   HIV     Plan/Recommendations:    - Increase Lantus 15 --> 28  units q 24 hrs at 1200  - Increase Novolog Meal Coverage: 1 units per 7 --> 5 g CHO, TID AC and PRN with snacks/supplements   - Novolog Correction Scale: 1:25 >140 TID AC, 1:25 >200 HS and 0200  - BG monitoring: TID AC, HS, 0200   - Hypoglycemia protocol    - Carb counting protocol      Discussion: Significant hyperglycemia after diet resumed yesterday. Now NPO at midnight for MR enterography at 10AM. Increase Lantus at noon today and increase Novolog ICR. Patient has multiple snacks in bed with him during visit prior to lunch (difficult for RN to cover carbs with insulin).  Patient needs more Lantus and Novolog since starting to eat, increase Lantus closer to PTA dosing and increase Novolog ICR. Per pharmacy, too soon to fill more Dexcom G6 sensors and can't fill reader until Feb 2025, pharmacy tried calling for a lost med override for the reader but it was not granted.  "Outpatient can try putting through a Prior Auth for Dexcom G7. Will plan to discharge with a glucometer. Possible discharge today, see plan below. Primary team working on follow up with PCP and MTM pharmacist.       Discharge considerations coped forward from 10/25: Patient's social situation makes diabetes management extremely complicated (homelessness, food insecure, methamphetamine use, does not have a phone, difficulty making outpatient visits). Unfortunately, he has tired/failed or has contraindications to almost all non-insulin diabetes medications. He wonders about trying Ozempic (previously had rash to Trulicity), would avoid GLP1a in setting of ?gastroparesis and severely dilated stomach with concern for gastric outlet obstruction on admission. Patient very aware of the need to control diabetes to prevent complications. We discussed trying short acting insulin (in place of resuming Glipizide which was stopped a few months ago for unknown reason). He previously used Novolog a few years ago. With Novolog, patient can give Novolog when he has access to food (would dose conservatively). He currently doesn't have a glucometer and lost Dexcom G6 reader (can't fill again until 2/2025). Will need close follow up with his outpatient pharmacist for MTM. Primary team working on getting SW involved for resources.       Diabetes Management Team Discharge Recommendations:   Instructions to patient were posted in AVS and discussed on day of discharge.   Medications and supplies are to be ordered by primary service on discharge.   *please use the DIAB non-branded discharge supply order set ([id number])*    Patient will need the following supplies prescribed: Lantus Solostar pens, Novolog Flexpens, \"BD\" (32G x 4mm) insulin pen needles, glucometer, lancets, and test strips (if brand of meter not known, can be ordered \"no brand specified\" and note to pharmacy: \"can substitute per insurance coverage\"), sharps container, " "alcohol swabs.     Blood Glucose Checks: three times daily before meals and at bedtime via glucometer.    Diabetes Medications:   1) Lantus 28 units once daily at 9AM. *Reduce to 15 units if you think you will not have any food that day to avoid low blood sugar when not eating*    2) Novolog 10 units three times daily with meals (or when you have access to a meal)    Diabetes Outpatient follow up: Follow up with PCP and pharmacist for Medication Therapy Management (Dorie Holland) in 1-2 weeks from discharge. Reach out sooner if blood glucose consistently >200 mg/dL or having any lows < 70 mg/dL    How to treat a low blood sugar (<70 mg/dL):  You may be experiencing hypoglycemia (low blood sugar) if:  BG less than 70 mg/dL or when feeling symptoms of hypoglycemia such as:  Sweating  Shakiness/Tremors  Increased appetite  Nausea  Dizziness or light-headedness  Sleepiness  Weakness  Rapid heart rate  Headache  Tingling around mouth and tongue    If you are having a low blood sugar, do the following steps: \"RULE OF 15s\"   -Eat 15 grams simple Carbs (1/2 cup orange juice, 4 glucose tablets, 1/2 cup regular soda, packet of fruit snacks)  -Wait 15 minutes  -Test with your blood glucose meter again  -If your blood sugar is above 70, then continue normally  -If still less than 70, repeat above process until over 70  -Call clinic for recurrent low blood glucose (more than 2 a week or 2 a day)     If you have urgent questions or concerns regarding your blood sugars or insulin, you may contact [phone] (the main hospital ). Ask to speak with the endocrinologist on call.    Your target A1c value is less than 7%. Your most recent A1c is 10.5%.     Thank you for letting the Diabetes Management Team be involved in your care!      Please notify Inpatient Diabetes Service if changes are planned to steroids, nutrition, TPN/TF and anticipated procedures requiring prolonged NPO status.         Interval History/Review of " Systems :   - The last 24 hours progress and nursing notes reviewed.  - Patient frustrated that lunch is delayed today. Normal appetite, no nausea, emesis, diarrhea or constipation.  - Agreeable to discharge plan above.     Planned Procedures/Surgeries: MR enterography at 10AM 10/26    Inpatient Glucose Control:       Recent Labs   Lab 10/26/24  0256 10/25/24  2329 10/25/24  1719 10/25/24  1321 10/25/24  1123 10/25/24  0916   * 365* 303* 229* 248* 268*             Medications Impacting Glycemia:   Steroids:  none   D5W-containing solutions/medications: none   Other medications impacting glucose: none         Nutrition:   Orders Placed This Encounter      NPO per Anesthesia Guidelines for Procedure/Surgery Except for: Meds  Supplements: none   TF: none   TPN: none         Diabetes History: see full consult note for complete diabetes history   Diabetes Type and Duration: T2DM since ~2017 (2015 per A1c 6.7% 10/2015)     PTA Medication Regimen:   - Lantus 27 units daily in AM, denies missing doses despite not checking BG for the past month.  (Per pharmacy note 7/25/24, patient was prescribed Lantus 27 units twice daily and there was concern about insulin storage during summer in setting of homelessness)     Historical Diabetes Medications:   - Glipizide ER 10 mg daily --> discontinued 7/25/24, patient not sure why this was discontinued and notes aren't clear.  - Januvia ? - patient doesn't remember taking this, was using last yr per chart review  - Hx mild allergy to GLP-1 (trulicity) rash on chest/arms, legs --> also has hx of gastroparesis and bowel obstruction.   - Metformin, discontinued 2/2 history of side effects of diarrhea   - Jardiance: may have caused wounds on leg (unclear hx) per chart review     Glucose monitoring device and frequency: Dexcom G6 started May 2024, stopped 1 month ago 2/2 losing reader. Was then prescribe G7 but patient states he couldn't pick this up from the pharmacy. Lost  "glucometer, says insurance won't pay for CGM and glucometer. Hasn't checked BG in a month.      Outpatient Diabetes Provider: [facility] endocrinology [facility] 10/10/2023 following hospital admission 9/2023. Patient states he doesn't have a PCP. Established care with internal med MD (Anna Dailey) 2/2024. Follows with pharmacist (Dorie Holland) for Medication Therapy Management, last saw Skyler, 7/25/24.        Physical Exam:   BP (!) 148/82 (BP Location: Left arm)   Pulse 81   Temp 98.5  F (36.9  C) (Oral)   Resp 16   Ht 1.626 m (5' 4\")   Wt 70.3 kg (155 lb)   SpO2 99%   BMI 26.61 kg/m    General:  well appearing, NAD, resting comfortably in bed.  Lungs: unlabored breathing on RA.  Mental Status:  Alert and oriented x3  Psych:   Cooperative, good eye contact, full/appropriate affect         Data:     Lab Results   Component Value Date    A1C 10.5 (H) 10/23/2024    A1C 11.7 (H) 07/13/2024    A1C 13.4 (H) 02/06/2024    A1C 10.7 (H) 11/09/2023    A1C 13.3 (H) 08/06/2023    A1C 9.7 (H) 11/11/2020    A1C 8.2 (H) 09/25/2020    A1C 8.2 (H) 08/27/2020    A1C 11.6 (H) 02/11/2020    A1C 12.3 (H) 01/07/2020       ROUTINE IP LABS (Last four results)  BMP  Recent Labs   Lab 10/26/24  0256 10/25/24  2329 10/25/24  1719 10/25/24  1321 10/25/24  0438 10/25/24  0429 10/24/24  0829 10/24/24  0753 10/23/24  1030 10/23/24  0721 10/23/24  0603 10/22/24  2245   NA  --   --   --   --   --  135  --  140  --  133*  --  134*   POTASSIUM  --   --   --   --   --  3.9  --  3.6  --  3.6  --  3.7   CHLORIDE  --   --   --   --   --  102  --  108*  --  101  --  99   LINDA  --   --   --   --   --  9.0  --  8.9  --  8.8  --  9.2   CO2  --   --   --   --   --  24  --  25  --  20*  --  23   BUN  --   --   --   --   --  11.5  --  9.7  --  7.3*  --  9.8   CR  --   --   --   --   --  0.79  0.79  --  0.75  --  0.65*  --  0.65*   * 365* 303* 229*   < > 395*   < > 95   < > 440*   < > 410*    < > = values in this interval not " displayed.     CBC  Recent Labs   Lab 10/26/24  0003 10/23/24  0721 10/22/24  2245 10/21/24  1031   WBC  --  5.8 7.5 6.8   RBC  --  4.14* 4.19* 4.17*   HGB  --  12.1* 12.5* 12.3*   HCT  --  36.5* 37.0* 38.0*   MCV  --  88 88 91   MCH  --  29.2 29.8 29.5   MCHC  --  33.2 33.8 32.4   RDW  --  12.9 12.9 13.0    [phone]       Inpatient Diabetes Service will continue to follow, please don't hesitate to contact the team with any questions or concerns.     Zoya Fletcher PA-C  Inpatient Diabetes Service  Pager: [phone]  Available on vocera    Plan discussed with patient, bedside RN, and primary team via this note.    To contact Inpatient Diabetes Service:     7 AM - 5 PM: Page the IDS KASSY following the patient that day (see filed or incomplete progress notes/consult notes under Endocrinology)    OR if uncertain of provider assignment: page job code [phone]    5 PM - 7 AM: First call after hours is to primary service.    For urgent after-hours questions, page job code for on call fellow: [phone]     I spent a total of 40 minutes on the date of the encounter doing prep/post-work, chart review, history and exam, documentation and further activities per the note including lab review, multidisciplinary communication, counseling the patient and/or coordinating care regarding acute hyper/hypoglycemic management, as well as discharge management and planning/communication.

## 2024-10-26 NOTE — DISCHARGE SUMMARY
"[facility]  Discharge Summary - Medicine & Pediatrics       Date of Admission:  10/22/2024  Date of Discharge:  10/26/2024  Discharging Provider: Josué Tariq MD  Discharge Service: Medicine Service, JORGE TEAM 1    Discharge Diagnoses   Poorly controlled, insulin dependent, DM2  Abd pain  N/v  Gastroparesis  h/o SBO  Esophagitis  Methamphetamine use disorder  Tobacco Use  HIV      Clinically Significant Risk Factors     # DMII: A1C = 10.5 % (Ref range: <5.7 %) within past 6 months  # Overweight: Estimated body mass index is 26.61 kg/m  as calculated from the following:    Height as of this encounter: 1.626 m (5' 4\").    Weight as of this encounter: 70.3 kg (155 lb).       Follow-ups Needed After Discharge /med changes  - PCP within 1 week. Address insulin plan  - [facility] pharmacy referral, to be seen within 1 week  - changed insulin plan to lantus 28 units daily, novolog TID w/meals  - started pantoprazole 40 mg BID    Unresulted Labs Ordered in the Past 30 Days of this Admission       Date and Time Order Name Status Description    10/24/2024  3:40 PM Amphetamines Confirmation In process     10/24/2024 12:03 AM Amphetamine Quantitative Urine In process         These results will be followed up by PCP    Discharge Disposition   Discharged to: homeless  Condition at discharge: Stable    Hospital Course   Andrew Clolier is a58 y/o M with pmhx of HIV, Esophagitis, poorly controlled DM2, TOMMY, h/o SBO, and difficult social situation presented with abdominal pain. Has had prior admission (10/7-10/10) with concern for gastric outlet obstruction (GOO) and recurrent small bowel obstruction. Multiple additional ER visits since then for similar symptoms. Imaging was negative for SBO, It did show severely dilated stomach and there was concern for GOO however this is felt to be unlikely given prior EGD (10/10/24) not showing GOO. He was admitted on 10/22/2024 for workup of " abdominal pain. The following problems were addressed during his hospitalization:    Poorly controlled, insulin dependent, DM2  PTA was prescribed Lantus 27u BID and Glipizide 10mg, unclear how often he was taking this. Reported only taking Lantus 27u once a day. Reported that his BS outpatient is very uncontrolled with values regularly in the 400s. Due to social barriers eats whatever is available, which at [employer] is generally large amounts of pasta. A1c of 11.7% on 7/13/24, now 10.5 on 10/23. Endocrinology consulted during admission for assistance with outpatient insulin plan.  - PCP within 1 week. Address insulin plan  - MTM pharmacy referral, to be seen within 1 week  - changed insulin plan to lantus 28 units daily, novolog TID w/meals     Abd pain  N/v  Gastroparesis?  h/o SBO  Esophagitis  Constipation  Abdominal pain in LUQ started 10/22 afternoon. CT A/P showed moderate to marked interval fluid-filled distention of the stomach since prior CTs. Had been admitted earlier in October for similar presentation and was diagnosed with SBO but left AMA prior to any intervention. EGD was previously performed showing LA Grade B Esophagitis without gastric outlet obstruction. Current highest concern is for gastroparesis with likely contribution from poorly controlled DM, Methamphetamine use, and HIV neuropathy. MR enterography on 10/26/24: no evidence of bowel obstruction or acute inflammation; stomach is distended with fluid and ingested food but no obstructing mass or wall thickening; and cholelithiasis. Also noted to be constipated during this hospitalization and this also likely contributed somewhat to his abdominal pain, which had improved by time of discharge.  - started pantoprazole 40 mg BID  - ensure abdominal pain improved at PCP follow up. May need more bowel meds if still constipated        Methamphetamine use disorder  Denies methamphetamine use. Reports no use for 3 years. UDS from 10/8/24 was  positive for Amphetamines, review of medication list does not immediately show anything that would cause a false positive.   - Denies any usage of amphetamines, reports that his test is positive because people around him smoke methamphetamine  - May consult addiction medicine if further conversation with pt is not fruitful    Tobacco Use  Does not want nicotine replacement at this time     HIV  Most recent CD4 423 (10/10/24). Follows with Dr. Ivory last seen 7/2024.  - Continue Biktarvy    Possible IPMN   2 mm cyst in the pancreatic tail noted on MR enterography on 10/26  - needs follow up imaging in 6 months    Prostatomegaly with mild urinary bladder wall thickening  Noted on MR enterography on 10/26  - follow up as needed with PCP    #Social Determinants of Health  Appears to have poor medication adherence/availability despite pt report. Currently staying at a shelter without stable housing. Does not have a phone.   - Updated SW on pt's request for specific contact with [facility] and a referral to a specific shelter.     Consultations This Hospital Stay   GI LUMINAL ADULT IP CONSULT  CARE MANAGEMENT / SOCIAL WORK IP CONSULT  NUTRITION SERVICES ADULT IP CONSULT  ENDOCRINE DIABETES ADULT IP CONSULT  PHARMACY LIAISON FOR MEDICATION COVERAGE CONSULT    Code Status   Full Code       The patient was discussed with Dr. Marciano Bruno MD  [facility] 7C MED SURG  [address]  [place] [address]  Phone: [phone]  ______________________________________________________________________    Physical Exam   Vital Signs: Temp: 98.5  F (36.9  C) Temp src: Oral BP: (!) 148/82 Pulse: 81   Resp: 16 SpO2: 99 % O2 Device: None (Room air)    Weight: 155 lbs 0 oz  General: Alert. Laying in bed comfortably. NAD. Well developed and nourished.  HEENT: MMM, EOMI, Clear conjunctivae  Pulmonary: Normal WOB on room air  Abdominal: non-distended  Skin: Clean, warm, dry, intact  Neuro:  Speech is normal. Moving all extremities. No focal deficits      Primary Care Physician   Physician No Ref-Primary    Discharge Orders   No discharge procedures on file.    Discharge Follow-up Details         Significant Results and Procedures   Most Recent 3 CBC's:  Recent Labs   Lab Test 10/26/24  0003 10/23/24  0721 10/22/24  2245 10/21/24  1031   WBC  --  5.8 7.5 6.8   HGB  --  12.1* 12.5* 12.3*   MCV  --  88 88 91    327 383 347     Most Recent 3 BMP's:  Recent Labs   Lab Test 10/26/24  1316 10/26/24  0909 10/26/24  0852 10/25/24  0438 10/25/24  0429 10/24/24  0829 10/24/24  0753   NA  --  131*  --   --  135  --  140   POTASSIUM  --  3.9  --   --  3.9  --  3.6   CHLORIDE  --  98  --   --  102  --  108*   CO2  --  23  --   --  24  --  25   BUN  --  18.3  --   --  11.5  --  9.7   CR  --  0.68  --   --  0.79  0.79  --  0.75   ANIONGAP  --  10  --   --  9  --  7   LINDA  --  9.2  --   --  9.0  --  8.9   * 316* 290*   < > 395*   < > 95    < > = values in this interval not displayed.   ,   Results for orders placed or performed during the hospital encounter of 10/22/24   CT Abdomen Pelvis w Contrast    Narrative    EXAM: CT ABDOMEN PELVIS W CONTRAST  LOCATION: [facility]  DATE: 10/23/2024    INDICATION: abd pain  COMPARISON: 10/21/2024  TECHNIQUE: CT scan of the abdomen and pelvis was performed following injection of IV contrast. Multiplanar reformats were obtained. Dose reduction techniques were used.  CONTRAST: 92ml isovue 370    FINDINGS:   LOWER CHEST: Dependent atelectasis in the posterior lung bases, greater on the left.    HEPATOBILIARY: Gallbladder is nondistended and contains a calcified gallstone. No significant mass or bile duct dilatation.     PANCREAS: No significant mass, duct dilatation, or inflammatory change.    SPLEEN: Normal.    ADRENAL GLANDS: Normal.    KIDNEYS/BLADDER: The bilateral kidneys enhance symmetrically without evidence for  hydronephrosis or pyelonephritis. No renal masses or calculi noted. The bilateral ureters and urinary bladder are unremarkable.    BOWEL: Moderate to marked interval fluid-filled distention of the stomach. Clinical correlation for gastric outlet obstruction symptoms. Nonspecific bowel gas pattern otherwise. Appendix not seen.    LYMPH NODES: No lymphadenopathy.    VASCULATURE: No abdominal aortic aneurysm. Mild vascular calcifications abdominal aorta and common iliac arteries.    PELVIC ORGANS: Penile implant with reservoir left lower pelvis anteriorly. No pelvic free fluid.    MUSCULOSKELETAL: No concerning osseous abnormalities. No inguinal hernias. Small periumbilical ventral hernia.      Impression    IMPRESSION:   1.  Moderate to marked interval fluid-filled distention of the stomach since prior exam. Clinical correlation for gastric outlet obstruction symptoms.  2.  Cholelithiasis without gallbladder distention. No bile duct dilatation.  3.  Dependent atelectasis in the posterior lung bases, greater on the left.  4.  Penile implant with reservoir left lower pelvis anteriorly.  5.  Small periumbilical ventral hernia.     XR Abdomen Port 1 View    Narrative    EXAM: ABDOMEN SINGLE VIEW PORTABLE  LOCATION: [facility]  DATE/TIME: 10/23/2024 2:55 AM CDT    INDICATION: Tube placement.  COMPARISON: 11/10/2024.      Impression    IMPRESSION: An enteric drainage tube is present with the distal tip in the gastric body.    MR Enterography wo and w Contrast    Addendum: 10/26/2024    International evidence-based Kyoto guidelines for the management of  intraductal papillary mucinous neoplasm of the pancreas:   Surveillance is recommended if no high risk stigmata or worrisome  features are present:  Primary imaging modalities recommended are MRI/MRCP or MDCT  Size of largest cyst:   *  Less than 20 mm: Follow-up imaging in 6 months once, then every 18  months if no change. Stop  "surveillance if stable for 5 years.  *  More than 20 mm but less than 30 mm: Follow-up imaging at 6 months,  12 months, then yearly if no change.   *  More than 30 mm- follow up imaging every 6 months.    GI consultation for surgery/endoscopic ultrasound is recommended for  cysts with \"high-risk stigmata\" of high grade dysplasia or invasive  carcinoma such as:  1. Obstructive jaundice in a patient with cystic lesion of the head of  the pancreas  2. Enhancing mural nodule > 5mm or solid component  3. Main pancreatic duct >10mm  4. Suspicious or positive results of cytology    If worrisome features are present, GI consultation is recommended  since management is based on multiple factors. The patient may go for  surgery/surveillance:   Worrisome features on imagin. Cyst more than or equal to 30mm  2. Thickened or enhancing cyst walls  3. Main pancreatic duct > 5mm and < 10mm.  4. Abrupt change in caliber of pancreatic duct with distal atrophy  5. Lymphadenopathy  6. Cyst growth rate > 2.5mm/year    *Reference: International evidence-based Kyoto guidelines for the  management of  intraductal papillary mucinous neoplasm of the pancreas Pancreatology:  24:(); 255-270.  https://doi.org/10.1016/j.huntley.2023.12.009    JANIE VANN MD         SYSTEM ID:  [id number]      Narrative    EXAMINATION: MR ENTEROGRAPHY W/O AND W CONTRAST, 10/26/2024 11:49 AM    TECHNIQUE: Multiplanar, multisequence MRI imaging of the abdomen and  pelvis was obtained using MR enterography protocol without and with  intravenous gadolinium. Contrast dose: 7mL Gadavist    COMPARISON: CT abdomen and pelvis 10/22/2024    HISTORY: Recurrent SBO    FINDINGS:     Exam quality: Inadequate distention of the small bowel limits  evaluation    Small and large bowel: The small and large bowel are normal in  caliber. There is no evidence of obstruction there is no abnormal wall  thickening or hyperenhancement.    Remainder of exam: The stomach is distended " with fluid and ingested  food. No wall thickening or mass in the stomach or duodenum.  Cholelithiasis. Normal appearance of the liver, biliary tree, spleen,  adrenals and kidneys. 2 mm cyst in the pancreatic tail (series 8,  image 32). There is mild thickening of the urinary bladder which may  be due to prostatomegaly. No ascites or lymphadenopathy. Aorta and its  major branches are patent. No suspicious osseous lesions.      Impression    IMPRESSION:   1.  No abnormality demonstrated in the bowel; specifically no evidence  of bowel obstruction or acute inflammation.  2.  The stomach is distended with fluid and ingested food but there is  no obstructing mass or wall thickening. Consider nuclear medicine  gastric emptying study to rule out gastroparesis.  3.  Cholelithiasis.  4.  Tiny pancreatic cyst may represent an IPMN. Follow-up guidelines  are given below.  5.  Prostatomegaly with mild urinary bladder wall thickening.    JANIE VANN MD         SYSTEM ID:  [id number]   XR Abdomen Port 1 View    Narrative    EXAMINATION:  XR ABDOMEN PORT 1 VIEW 10/26/2024     COMPARISON: Abdominal radiograph 10/23/2024    HISTORY: worsening LUQ abdominal pain, hx of SBO.    TECHNIQUE: One frontal supine view of the abdomen.    FINDINGS:  No abnormally dilated loops of bowel project over the abdomen.  Moderate colonic stool burden. Nonobstructive bowel gas pattern.  Partially visualized lung bases are within normal limits. Penile  prosthesis.      Impression    IMPRESSION:   Moderate colonic stool burden with nonobstructive bowel gas pattern.    I have personally reviewed the examination and initial interpretation  and I agree with the findings.    APRIL ELIZONDO MD         SYSTEM ID:  [id number]     *Note: Due to a large number of results and/or encounters for the requested time period, some results have not been displayed. A complete set of results can be found in Results Review.       Discharge Medications   Current Discharge  Medication List        CONTINUE these medications which have NOT CHANGED    Details   bictegravir-emtricitabine-tenofovir (BIKTARVY) -25 MG per tablet Take 1 tablet by mouth daily  Qty: 30 tablet, Refills: 2    Associated Diagnoses: Human immunodeficiency virus I infection (H)      blood glucose (NO BRAND SPECIFIED) lancets standard Use to test blood sugar 1 time daily or as directed.  Qty: 100 Lancet, Refills: 4    Associated Diagnoses: Type 2 diabetes mellitus with hyperglycemia, with long-term current use of insulin (H)      blood glucose (NO BRAND SPECIFIED) test strip Use to test blood sugar 1 time daily or as directed.  Qty: 100 strip, Refills: 4    Associated Diagnoses: Type 2 diabetes mellitus with hyperglycemia, with long-term current use of insulin (H)      blood glucose monitoring (NO BRAND SPECIFIED) meter device kit Use to test blood sugar 3 times daily or as directed.  Qty: 1 kit, Refills: 0    Associated Diagnoses: Type 2 diabetes mellitus with hyperglycemia, with long-term current use of insulin (H)      !! Continuous Glucose Sensor (DEXCOM G6 SENSOR) MISC 1 each every 10 days Change every 10 days.  Qty: 3 each, Refills: 11    Associated Diagnoses: Type 2 diabetes mellitus with hyperglycemia, with long-term current use of insulin (H)      !! Continuous Glucose Sensor (DEXCOM G7 SENSOR) MISC 1 Device every 10 days.  Qty: 9 each, Refills: 3    Associated Diagnoses: Type 2 diabetes mellitus with hyperglycemia, with long-term current use of insulin (H)      Continuous Glucose Transmitter (DEXCOM G6 TRANSMITTER) MISC 1 each every 3 months Change every 3 months.  Qty: 1 each, Refills: 3    Associated Diagnoses: Type 2 diabetes mellitus with hyperglycemia, with long-term current use of insulin (H)      famotidine (PEPCID) 40 MG tablet Take 1 tablet (40 mg) by mouth daily  Qty: 30 tablet, Refills: 2    Associated Diagnoses: Gastroesophageal reflux disease, unspecified whether esophagitis present     "  glipiZIDE (GLUCOTROL XL) 10 MG 24 hr tablet Take 1 tablet (10 mg) by mouth daily  Qty: 90 tablet, Refills: 1    Associated Diagnoses: Type 2 diabetes mellitus with hyperglycemia, with long-term current use of insulin (H)      insulin glargine (LANTUS PEN) 100 UNIT/ML pen Inject 27 units subcutaneously 2 times daily to lower blood sugar. Discard pens after they've been out of the fridge for 28 days. Keep at room temperature.  Qty: 15 mL, Refills: 1    Comments: If Lantus is not covered by insurance, may substitute Basaglar or Semglee or other insulin glargine product per insurance preference at same dose and frequency.    Associated Diagnoses: Type 2 diabetes mellitus with hyperglycemia, with long-term current use of insulin (H)      insulin pen needle (32G X 4 MM) 32G X 4 MM miscellaneous Use 4-5 pen needles daily or as directed.  Qty: 100 each, Refills: 2    Associated Diagnoses: Type 2 diabetes mellitus with hyperglycemia, with long-term current use of insulin (H)      Nutritional Supplements (ENSURE ACTIVE) LIQD Take 414 mLs by mouth daily  Qty: 30 mL, Refills: 11    Associated Diagnoses: Type 2 diabetes mellitus with hyperglycemia, with long-term current use of insulin (H); Human immunodeficiency virus I infection (H)       !! - Potential duplicate medications found. Please discuss with provider.        Allergies   Allergies   Allergen Reactions    Fentanyl Blisters     Per pt, after taking this medication had blisters develope    Tylenol [Acetaminophen] Itching    Dulaglutide Rash    Insulin Lispro Rash     Patient reported    Penicillin V Other (See Comments) and Rash     Diffuse maculopapular rash + feels \"high\", per pt.      "

## 2024-10-26 NOTE — PROGRESS NOTES
Discharge to: Homeless  Transportation: himself  Time: 1645  Prescriptions:sent to discharge pharm  Belongings: sent with patient along with donated shirt, pants, and socks   -if applicable return home meds from inpatient pharmacy: n/a  Access: removed  Care plan and education discontinued: yes  Paperwork:  gone through and sent with patient

## 2024-10-26 NOTE — PLAN OF CARE
"BP (!) 148/82 (BP Location: Left arm)   Pulse 81   Temp 98.5  F (36.9  C) (Oral)   Resp 16   Ht 1.626 m (5' 4\")   Wt 70.3 kg (155 lb)   SpO2 99%   BMI 26.61 kg/m      Care from: [id number]    VS & Pain: VSS, no pain reported; BG ACHS + 0200  Neuro: A+Ox4  Respiratory: WDL, RA  Cardiac: WDL  Peripheral neurovascular: WDL  GI/: WDL, uses bathroom appropriately  Nutrition: high carb consistent 75g and carb coverage  Skin: WDL  Lines: L PIV:SL  Activity: Independent  Events this shift: refuses bed alarm- pt is steady; MR enterography scan done; new insulin orders placed     Plan:  discharge  "

## 2024-10-27 LAB
AMPHET UR-MCNC: 197 NG/ML
MDA UR-MCNC: <200 NG/ML
MDEA UR-MCNC: <200 NG/ML
MDMA UR-MCNC: <200 NG/ML
METHAMPHET UR-MCNC: 210 NG/ML
PHENTERMINE UR CFM-MCNC: <200 NG/ML

## 2024-10-29 ENCOUNTER — HOSPITAL ENCOUNTER (EMERGENCY)
Facility: CLINIC | Age: 59
Discharge: HOME | End: 2024-10-30
Attending: STUDENT IN AN ORGANIZED HEALTH CARE EDUCATION/TRAINING PROGRAM | Admitting: STUDENT IN AN ORGANIZED HEALTH CARE EDUCATION/TRAINING PROGRAM
Payer: COMMERCIAL

## 2024-10-29 ENCOUNTER — APPOINTMENT (OUTPATIENT)
Dept: GENERAL RADIOLOGY | Facility: CLINIC | Age: 59
End: 2024-10-29
Attending: STUDENT IN AN ORGANIZED HEALTH CARE EDUCATION/TRAINING PROGRAM
Payer: COMMERCIAL

## 2024-10-29 DIAGNOSIS — M25.562 ACUTE PAIN OF LEFT KNEE: ICD-10-CM

## 2024-10-29 PROCEDURE — 99283 EMERGENCY DEPT VISIT LOW MDM: CPT | Performed by: STUDENT IN AN ORGANIZED HEALTH CARE EDUCATION/TRAINING PROGRAM

## 2024-10-29 PROCEDURE — 99284 EMERGENCY DEPT VISIT MOD MDM: CPT | Performed by: STUDENT IN AN ORGANIZED HEALTH CARE EDUCATION/TRAINING PROGRAM

## 2024-10-29 PROCEDURE — 73562 X-RAY EXAM OF KNEE 3: CPT | Mod: LT

## 2024-10-29 PROCEDURE — 73562 X-RAY EXAM OF KNEE 3: CPT | Mod: 26 | Performed by: RADIOLOGY

## 2024-10-29 ASSESSMENT — ACTIVITIES OF DAILY LIVING (ADL): ADLS_ACUITY_SCORE: 0

## 2024-10-30 VITALS
SYSTOLIC BLOOD PRESSURE: 122 MMHG | HEART RATE: 89 BPM | HEIGHT: 60 IN | DIASTOLIC BLOOD PRESSURE: 82 MMHG | RESPIRATION RATE: 14 BRPM | TEMPERATURE: 97.9 F | BODY MASS INDEX: 31.41 KG/M2 | OXYGEN SATURATION: 98 % | WEIGHT: 160 LBS

## 2024-10-30 LAB
AMPHET SERPL-MCNC: <20 NG/ML
MDA SERPL-MCNC: <20 NG/ML
MDEA SERPL-MCNC: <20 NG/ML
MDMA SERPL-MCNC: <20 NG/ML
METHAMPHET SERPL-MCNC: <20 NG/ML

## 2024-10-30 NOTE — DISCHARGE INSTRUCTIONS
Please return to the emergency department if you develop any new or worsening symptoms  I have made you a visit with a primary care doctor, please go to this follow-up visit in the next 1 to 2 days for reassessment of your knee making sure it is getting better  Please use Tylenol and ibuprofen for the pain

## 2024-10-30 NOTE — ED TRIAGE NOTES
Pt ambulatory to triage with complains of a fall last Saturday which pt hit his head and left knee and now complains of pain to left knee and headache.Pt rates his pain a 10/10.Pt not on anticoagulants.  HX:DM, HIV.     Triage Assessment (Adult)       Row Name 10/29/24 2706          Triage Assessment    Airway WDL WDL        Respiratory WDL    Respiratory WDL WDL        Skin Circulation/Temperature WDL    Skin Circulation/Temperature WDL WDL        Cardiac WDL    Cardiac WDL X;rhythm     Pulse Rate & Regularity tachycardic        Peripheral/Neurovascular WDL    Peripheral Neurovascular WDL WDL        Cognitive/Neuro/Behavioral WDL    Cognitive/Neuro/Behavioral WDL WDL

## 2024-10-30 NOTE — ED PROVIDER NOTES
[facility] ([facility])    10/29/24       ED PROVIDER NOTE    History     Chief Complaint   Patient presents with    Knee Pain    Fall     HPI  Andrew Collier is a 58 year old male with history of HIV, esophagitis, poorly controlled insulin-dependent DM2, substance use disorder, prior SBO, and multiple recent evaluations and admissions for abdominal pain concerning for gastric outlet obstruction and recurrent SBO who presents to the ED for evaluation following a fall.  Patient reports falling on 10/26/2024, injuring his left knee and hitting the back of his head.  Denies loss of consciousness.  He is not anticoagulated.  He is still able to ambulate although with some pain.  He reports headache with fogginess.  Denies nausea or vomiting.    MR ENTEROGRAPHY W/O AND W CONTRAST, 10/26/2024   IMPRESSION:   1.  No abnormality demonstrated in the bowel; specifically no evidence  of bowel obstruction or acute inflammation.  2.  The stomach is distended with fluid and ingested food but there is  no obstructing mass or wall thickening. Consider nuclear medicine  gastric emptying study to rule out gastroparesis.  3.  Cholelithiasis.  4.  Tiny pancreatic cyst may represent an IPMN. Follow-up guidelines  are given below.  5.  Prostatomegaly with mild urinary bladder wall thickening.      Past Medical History  Past Medical History:   Diagnosis Date    Acute appendicitis with localized peritonitis 1/31/2018    AIDS (H)     Allergic rhinitis due to other allergen     DNS    Anal dysplasia     Chronic abdominal pain     CNS toxoplasmosis (H)     COVID-19 1/11/2022    Diabetes type 2, controlled (H)     GERD (gastroesophageal reflux disease)     Herpes zoster 9/23/2016    History of seizure     History of substance abuse (H)     HIV (human immunodeficiency virus infection) (H)     HLD (hyperlipidemia)     Lung nodules     Periungual wart     Pneumonia 1/6/2019    PTSD (post-traumatic stress  disorder)     Sleep apnea     doesn't use CPAP     Past Surgical History:   Procedure Laterality Date    ANOSCOPY N/A 9/9/2020    Procedure: Exam Under Anesthesia, ANOSCOPY, fulgeration of rectal fissures with Rectal Biopsies;  Surgeon: Thanh Lundberg MD;  Location: [facility] OR    COLONOSCOPY Left 1/22/2016    Procedure: COMBINED COLONOSCOPY, SINGLE OR MULTIPLE BIOPSY/POLYPECTOMY BY BIOPSY;  Surgeon: Clark Saini MD;  Location: [facility] GI    ESOPHAGOSCOPY, GASTROSCOPY, DUODENOSCOPY (EGD), COMBINED N/A 6/6/2022    Procedure: ESOPHAGOGASTRODUODENOSCOPY, WITH BIOPSY;  Surgeon: Kecia Benítez MD;  Location: [facility]    ESOPHAGOSCOPY, GASTROSCOPY, DUODENOSCOPY (EGD), COMBINED N/A 10/10/2024    Procedure: Esophagoscopy, gastroscopy, duodenoscopy (EGD), combined;  Surgeon: Nico Bhatti DO;  Location: [facility]    HC EXPLORE UNDESC TESTIS,INGUIN/SCROTAL      LAPAROSCOPIC APPENDECTOMY N/A 1/31/2018    Procedure: LAPAROSCOPIC APPENDECTOMY;  LAPAROSCOPIC APPENDECTOMY;  Surgeon: Dawn Holt MD;  Location: [facility]    LAPAROSCOPY DIAGNOSTIC (GENERAL) N/A 7/26/2016    Procedure: LAPAROSCOPY DIAGNOSTIC (GENERAL);  Surgeon: Susannah Arriaga MD;  Location: [facility]    LAPAROSCOPY DIAGNOSTIC (GENERAL) N/A 4/16/2018    Procedure: LAPAROSCOPY DIAGNOSTIC (GENERAL);  Diagnostic laparoscopy and lysis of adhesions;  Surgeon: Prince Dowling MD;  Location: [facility] OR    OPTICAL TRACKING SYSTEM CRANIOTOMY, EXCISE TUMOR, COMBINED Left 4/10/2015    Procedure: COMBINED OPTICAL TRACKING SYSTEM CRANIOTOMY, EXCISE TUMOR;  Surgeon: Mirlande Colmenares MD;  Location: [facility] OR    REPAIR GAMEKEEPER'S THUMB Right 12/2/2016    Procedure: REPAIR LIGAMENT ULNAR COLLATERAL THUMB (GAMEKEEPER'S);  Surgeon: Evin Zamorano MD;  Location: [facility] OR    ZZC NONSPECIFIC PROCEDURE      right forearm fracture     bictegravir-emtricitabine-tenofovir (BIKTARVY) -25 MG per tablet  blood glucose (NO BRAND SPECIFIED) lancets standard  blood glucose (NO  "BRAND SPECIFIED) test strip  blood glucose monitoring (NO BRAND SPECIFIED) meter device kit  Continuous Glucose Sensor (DEXCOM G6 SENSOR) MISC  Continuous Glucose Sensor (DEXCOM G7 SENSOR) MISC  Continuous Glucose Transmitter (DEXCOM G6 TRANSMITTER) MISC  insulin aspart (NOVOLOG PEN) 100 UNIT/ML pen  insulin glargine (LANTUS PEN) 100 UNIT/ML pen  insulin pen needle (32G X 4 MM) 32G X 4 MM miscellaneous  Nutritional Supplements (ENSURE ACTIVE) LIQD  pantoprazole (PROTONIX) 40 MG EC tablet  sennosides (SENOKOT) 8.6 MG tablet      Allergies   Allergen Reactions    Fentanyl Blisters     Per pt, after taking this medication had blisters develope    Tylenol [Acetaminophen] Itching    Dulaglutide Rash    Insulin Lispro Rash     Patient reported    Penicillin V Other (See Comments) and Rash     Diffuse maculopapular rash + feels \"high\", per pt.      Family History  Family History   Problem Relation Age of Onset    Diabetes Brother     Diabetes Father     Alzheimer Disease Father     Unknown/Adopted Mother     Diabetes Paternal Grandfather     Cancer No family hx of         no skin cancer    Skin Cancer No family hx of         no famiy hx of skin cancer    Glaucoma No family hx of     Macular Degeneration No family hx of      Social History   Social History     Tobacco Use    Smoking status: Some Days     Current packs/day: 0.25     Average packs/day: 0.3 packs/day for 40.0 years (10.0 ttl pk-yrs)     Types: Cigarettes    Smokeless tobacco: Former   Substance Use Topics    Alcohol use: No     Alcohol/week: 0.0 standard drinks of alcohol     Comment: Last etoh in 2007    Drug use: Not Currently     Types: Marijuana     Comment: chart indicated meth use but pt denies; states he does not use drugs      Past medical history, past surgical history, medications, allergies, family history, and social history were reviewed with the patient. No additional pertinent items.     A complete review of systems was performed with pertinent " positives and negatives noted in the HPI, and all other systems negative.    Physical Exam   BP: 116/63  Pulse: 107  Temp: 97.9  F (36.6  C)  Resp: 14  Height: 152.4 cm (5')  Weight: 72.6 kg (160 lb)  SpO2: 98 %  Physical Exam  Vitals and nursing note reviewed.   Constitutional:       General: He is not in acute distress.     Appearance: Normal appearance. He is not toxic-appearing.   HENT:      Head: Normocephalic and atraumatic.   Eyes:      General: No scleral icterus.     Extraocular Movements: Extraocular movements intact.      Conjunctiva/sclera: Conjunctivae normal.      Pupils: Pupils are equal, round, and reactive to light.   Cardiovascular:      Rate and Rhythm: Normal rate.      Heart sounds: Normal heart sounds.   Pulmonary:      Effort: Pulmonary effort is normal. No respiratory distress.      Breath sounds: Normal breath sounds.   Abdominal:      Palpations: Abdomen is soft.      Tenderness: There is no abdominal tenderness.   Musculoskeletal:         General: No deformity.      Cervical back: Neck supple.      Comments: Able to ambulate with steady gait, bilateral knee is nonswollen, bilateral lower extremities distally intact neurovascularly, mild left knee tenderness to palpation on medial side   Skin:     General: Skin is warm.   Neurological:      General: No focal deficit present.      Mental Status: He is alert and oriented to person, place, and time.           ED Course, Procedures, & Data      Procedures                No results found for any visits on 10/29/24.  Medications - No data to display  Labs Ordered and Resulted from Time of ED Arrival to Time of ED Departure - No data to display  No orders to display          Critical care was not performed.     Medical Decision Making  The patient's presentation was of moderate complexity (an acute illness with systemic symptoms).    The patient's evaluation involved:  review of external note(s) from 3+ sources (see separate area of note for  details)  review of 3+ test result(s) ordered prior to this encounter (see separate area of note for details)  strong consideration of a test (CT leg) that was ultimately deferred  ordering and/or review of 1 test(s) in this encounter (see separate area of note for details)    The patient's management necessitated moderate risk (order and review x-ray, consideration of CT scan, ultimately coordination of care and disposition home).    Assessment & Plan    As patient is able to ambulate with steady gait here in the emergency department, pain is over medial side of the left knee, no swelling, no abrasion, although I considered CT scan to look for subacute fracture given x-ray showing possible cortical irregularity of the proximal fibula, less likely given patient's clinical exam, so after shared decision making we will discharge patient home  Consider CT head, but his injury happened on Saturday, and he is neuro intact today, no signs of trauma, will defer CT head  Patient will be discharged home with final diagnosis of left knee pain, strict return precaution to come back to the emergency room for any new or worsening symptoms as well as a primary care visit in the next 1 to 2 days.    I have reviewed the nursing notes. I have reviewed the findings, diagnosis, plan and need for follow up with the patient.    New Prescriptions    No medications on file       Final diagnoses:   None   I, Landon Schrader, am serving as a trained medical scribe to document services personally performed by Jacques Mann MD based on the provider's statements to me on October 29, 2024.  This document has been checked and approved by the attending provider.    I, Jacques Mann MD, was physically present and have reviewed and verified the accuracy of this note documented by Landon Schrader medical scribe.      Jacques Mann MD  [facility] EMERGENCY DEPARTMENT  10/29/2024     Jacques Mann MD  10/30/24 0044       Jacques Mann,  MD  10/30/24 0046

## 2024-11-05 ENCOUNTER — HOSPITAL ENCOUNTER (EMERGENCY)
Facility: CLINIC | Age: 59
Discharge: HOME | End: 2024-11-06
Attending: STUDENT IN AN ORGANIZED HEALTH CARE EDUCATION/TRAINING PROGRAM | Admitting: STUDENT IN AN ORGANIZED HEALTH CARE EDUCATION/TRAINING PROGRAM
Payer: COMMERCIAL

## 2024-11-05 ENCOUNTER — APPOINTMENT (OUTPATIENT)
Dept: GENERAL RADIOLOGY | Facility: CLINIC | Age: 59
End: 2024-11-05
Attending: STUDENT IN AN ORGANIZED HEALTH CARE EDUCATION/TRAINING PROGRAM
Payer: COMMERCIAL

## 2024-11-05 VITALS
BODY MASS INDEX: 27.31 KG/M2 | HEIGHT: 64 IN | HEART RATE: 101 BPM | DIASTOLIC BLOOD PRESSURE: 79 MMHG | OXYGEN SATURATION: 100 % | SYSTOLIC BLOOD PRESSURE: 129 MMHG | TEMPERATURE: 98.8 F | RESPIRATION RATE: 18 BRPM | WEIGHT: 160 LBS

## 2024-11-05 DIAGNOSIS — E11.69 TYPE 2 DIABETES MELLITUS WITH OTHER SPECIFIED COMPLICATION, WITH LONG-TERM CURRENT USE OF INSULIN (H): ICD-10-CM

## 2024-11-05 DIAGNOSIS — J06.9 VIRAL URI WITH COUGH: ICD-10-CM

## 2024-11-05 DIAGNOSIS — Z79.4 TYPE 2 DIABETES MELLITUS WITH OTHER SPECIFIED COMPLICATION, WITH LONG-TERM CURRENT USE OF INSULIN (H): ICD-10-CM

## 2024-11-05 DIAGNOSIS — R73.9 HYPERGLYCEMIA: ICD-10-CM

## 2024-11-05 LAB
ALBUMIN SERPL BCG-MCNC: 4.1 G/DL (ref 3.5–5.2)
ALP SERPL-CCNC: 152 U/L (ref 40–150)
ALT SERPL W P-5'-P-CCNC: 13 U/L (ref 0–70)
ANION GAP SERPL CALCULATED.3IONS-SCNC: 10 MMOL/L (ref 7–15)
AST SERPL W P-5'-P-CCNC: 15 U/L (ref 0–45)
BASOPHILS # BLD AUTO: 0 10E3/UL (ref 0–0.2)
BASOPHILS NFR BLD AUTO: 0 %
BILIRUB SERPL-MCNC: 0.5 MG/DL
BUN SERPL-MCNC: 21.8 MG/DL (ref 8–23)
C PNEUM DNA SPEC QL NAA+PROBE: NOT DETECTED
CALCIUM SERPL-MCNC: 9.7 MG/DL (ref 8.8–10.4)
CHLORIDE SERPL-SCNC: 92 MMOL/L (ref 98–107)
CREAT SERPL-MCNC: 0.76 MG/DL (ref 0.67–1.17)
EGFRCR SERPLBLD CKD-EPI 2021: >90 ML/MIN/1.73M2
EOSINOPHIL # BLD AUTO: 0.4 10E3/UL (ref 0–0.7)
EOSINOPHIL NFR BLD AUTO: 4 %
ERYTHROCYTE [DISTWIDTH] IN BLOOD BY AUTOMATED COUNT: 12.8 % (ref 10–15)
FLUAV H1 2009 PAND RNA SPEC QL NAA+PROBE: NOT DETECTED
FLUAV H1 RNA SPEC QL NAA+PROBE: NOT DETECTED
FLUAV H3 RNA SPEC QL NAA+PROBE: NOT DETECTED
FLUAV RNA SPEC QL NAA+PROBE: NOT DETECTED
FLUBV RNA SPEC QL NAA+PROBE: NOT DETECTED
GLUCOSE BLDC GLUCOMTR-MCNC: 320 MG/DL (ref 70–99)
GLUCOSE BLDC GLUCOMTR-MCNC: 387 MG/DL (ref 70–99)
GLUCOSE BLDC GLUCOMTR-MCNC: 399 MG/DL (ref 70–99)
GLUCOSE SERPL-MCNC: 464 MG/DL (ref 70–99)
HADV DNA SPEC QL NAA+PROBE: NOT DETECTED
HCO3 SERPL-SCNC: 26 MMOL/L (ref 22–29)
HCOV PNL SPEC NAA+PROBE: DETECTED
HCT VFR BLD AUTO: 36.2 % (ref 40–53)
HGB BLD-MCNC: 12.3 G/DL (ref 13.3–17.7)
HMPV RNA SPEC QL NAA+PROBE: NOT DETECTED
HPIV1 RNA SPEC QL NAA+PROBE: NOT DETECTED
HPIV2 RNA SPEC QL NAA+PROBE: NOT DETECTED
HPIV3 RNA SPEC QL NAA+PROBE: NOT DETECTED
HPIV4 RNA SPEC QL NAA+PROBE: NOT DETECTED
IMM GRANULOCYTES # BLD: 0.1 10E3/UL
IMM GRANULOCYTES NFR BLD: 1 %
LYMPHOCYTES # BLD AUTO: 2.4 10E3/UL (ref 0.8–5.3)
LYMPHOCYTES NFR BLD AUTO: 23 %
M PNEUMO DNA SPEC QL NAA+PROBE: NOT DETECTED
MCH RBC QN AUTO: 28.9 PG (ref 26.5–33)
MCHC RBC AUTO-ENTMCNC: 34 G/DL (ref 31.5–36.5)
MCV RBC AUTO: 85 FL (ref 78–100)
MONOCYTES # BLD AUTO: 0.8 10E3/UL (ref 0–1.3)
MONOCYTES NFR BLD AUTO: 8 %
NEUTROPHILS # BLD AUTO: 6.8 10E3/UL (ref 1.6–8.3)
NEUTROPHILS NFR BLD AUTO: 65 %
NRBC # BLD AUTO: 0 10E3/UL
NRBC BLD AUTO-RTO: 0 /100
PLATELET # BLD AUTO: 358 10E3/UL (ref 150–450)
POTASSIUM SERPL-SCNC: 4.2 MMOL/L (ref 3.4–5.3)
PROCALCITONIN SERPL IA-MCNC: 0.09 NG/ML
PROT SERPL-MCNC: 7.6 G/DL (ref 6.4–8.3)
RBC # BLD AUTO: 4.25 10E6/UL (ref 4.4–5.9)
RSV RNA SPEC QL NAA+PROBE: NOT DETECTED
RSV RNA SPEC QL NAA+PROBE: NOT DETECTED
RV+EV RNA SPEC QL NAA+PROBE: NOT DETECTED
SARS-COV-2 RNA RESP QL NAA+PROBE: NEGATIVE
SODIUM SERPL-SCNC: 128 MMOL/L (ref 135–145)
TROPONIN T SERPL HS-MCNC: 13 NG/L
TROPONIN T SERPL HS-MCNC: 14 NG/L
WBC # BLD AUTO: 10.5 10E3/UL (ref 4–11)

## 2024-11-05 PROCEDURE — 87486 CHLMYD PNEUM DNA AMP PROBE: CPT | Performed by: STUDENT IN AN ORGANIZED HEALTH CARE EDUCATION/TRAINING PROGRAM

## 2024-11-05 PROCEDURE — 84145 PROCALCITONIN (PCT): CPT | Performed by: STUDENT IN AN ORGANIZED HEALTH CARE EDUCATION/TRAINING PROGRAM

## 2024-11-05 PROCEDURE — 93010 ELECTROCARDIOGRAM REPORT: CPT | Performed by: STUDENT IN AN ORGANIZED HEALTH CARE EDUCATION/TRAINING PROGRAM

## 2024-11-05 PROCEDURE — 85004 AUTOMATED DIFF WBC COUNT: CPT | Performed by: STUDENT IN AN ORGANIZED HEALTH CARE EDUCATION/TRAINING PROGRAM

## 2024-11-05 PROCEDURE — 84484 ASSAY OF TROPONIN QUANT: CPT | Performed by: STUDENT IN AN ORGANIZED HEALTH CARE EDUCATION/TRAINING PROGRAM

## 2024-11-05 PROCEDURE — 87635 SARS-COV-2 COVID-19 AMP PRB: CPT | Performed by: STUDENT IN AN ORGANIZED HEALTH CARE EDUCATION/TRAINING PROGRAM

## 2024-11-05 PROCEDURE — 82040 ASSAY OF SERUM ALBUMIN: CPT | Performed by: STUDENT IN AN ORGANIZED HEALTH CARE EDUCATION/TRAINING PROGRAM

## 2024-11-05 PROCEDURE — 86359 T CELLS TOTAL COUNT: CPT | Performed by: STUDENT IN AN ORGANIZED HEALTH CARE EDUCATION/TRAINING PROGRAM

## 2024-11-05 PROCEDURE — 96361 HYDRATE IV INFUSION ADD-ON: CPT | Performed by: STUDENT IN AN ORGANIZED HEALTH CARE EDUCATION/TRAINING PROGRAM

## 2024-11-05 PROCEDURE — 71046 X-RAY EXAM CHEST 2 VIEWS: CPT

## 2024-11-05 PROCEDURE — 99285 EMERGENCY DEPT VISIT HI MDM: CPT | Mod: 25 | Performed by: STUDENT IN AN ORGANIZED HEALTH CARE EDUCATION/TRAINING PROGRAM

## 2024-11-05 PROCEDURE — 71046 X-RAY EXAM CHEST 2 VIEWS: CPT | Mod: 26 | Performed by: RADIOLOGY

## 2024-11-05 PROCEDURE — 250N000009 HC RX 250: Performed by: STUDENT IN AN ORGANIZED HEALTH CARE EDUCATION/TRAINING PROGRAM

## 2024-11-05 PROCEDURE — 82962 GLUCOSE BLOOD TEST: CPT

## 2024-11-05 PROCEDURE — 96360 HYDRATION IV INFUSION INIT: CPT | Performed by: STUDENT IN AN ORGANIZED HEALTH CARE EDUCATION/TRAINING PROGRAM

## 2024-11-05 PROCEDURE — 250N000013 HC RX MED GY IP 250 OP 250 PS 637: Performed by: STUDENT IN AN ORGANIZED HEALTH CARE EDUCATION/TRAINING PROGRAM

## 2024-11-05 PROCEDURE — 250N000012 HC RX MED GY IP 250 OP 636 PS 637: Performed by: STUDENT IN AN ORGANIZED HEALTH CARE EDUCATION/TRAINING PROGRAM

## 2024-11-05 PROCEDURE — 36415 COLL VENOUS BLD VENIPUNCTURE: CPT | Performed by: STUDENT IN AN ORGANIZED HEALTH CARE EDUCATION/TRAINING PROGRAM

## 2024-11-05 PROCEDURE — 99284 EMERGENCY DEPT VISIT MOD MDM: CPT | Performed by: STUDENT IN AN ORGANIZED HEALTH CARE EDUCATION/TRAINING PROGRAM

## 2024-11-05 PROCEDURE — 93005 ELECTROCARDIOGRAM TRACING: CPT | Performed by: STUDENT IN AN ORGANIZED HEALTH CARE EDUCATION/TRAINING PROGRAM

## 2024-11-05 PROCEDURE — 258N000003 HC RX IP 258 OP 636: Performed by: STUDENT IN AN ORGANIZED HEALTH CARE EDUCATION/TRAINING PROGRAM

## 2024-11-05 RX ORDER — BENZONATATE 100 MG/1
200 CAPSULE ORAL ONCE
Status: COMPLETED | OUTPATIENT
Start: 2024-11-05 | End: 2024-11-05

## 2024-11-05 RX ORDER — MAGNESIUM HYDROXIDE/ALUMINUM HYDROXICE/SIMETHICONE 120; 1200; 1200 MG/30ML; MG/30ML; MG/30ML
15 SUSPENSION ORAL ONCE
Status: COMPLETED | OUTPATIENT
Start: 2024-11-05 | End: 2024-11-05

## 2024-11-05 RX ORDER — LIDOCAINE HYDROCHLORIDE 20 MG/ML
10 SOLUTION OROPHARYNGEAL ONCE
Status: COMPLETED | OUTPATIENT
Start: 2024-11-05 | End: 2024-11-05

## 2024-11-05 RX ADMIN — LIDOCAINE HYDROCHLORIDE 10 ML: 20 SOLUTION ORAL at 18:19

## 2024-11-05 RX ADMIN — ALUMINUM HYDROXIDE, MAGNESIUM HYDROXIDE, DIMETHICONE 15 ML: 200; 200; 20 LIQUID ORAL at 18:20

## 2024-11-05 RX ADMIN — INSULIN ASPART 5 UNITS: 100 INJECTION, SOLUTION INTRAVENOUS; SUBCUTANEOUS at 20:09

## 2024-11-05 RX ADMIN — BENZONATATE 200 MG: 100 CAPSULE ORAL at 18:20

## 2024-11-05 RX ADMIN — SODIUM CHLORIDE, POTASSIUM CHLORIDE, SODIUM LACTATE AND CALCIUM CHLORIDE 1000 ML: 600; 310; 30; 20 INJECTION, SOLUTION INTRAVENOUS at 18:19

## 2024-11-05 RX ADMIN — INSULIN GLARGINE 28 UNITS: 100 INJECTION, SOLUTION SUBCUTANEOUS at 20:09

## 2024-11-05 ASSESSMENT — ACTIVITIES OF DAILY LIVING (ADL)
ADLS_ACUITY_SCORE: 0

## 2024-11-05 NOTE — ED TRIAGE NOTES
Pt BIB EMS from [facility] MMIS for c/o flu like symptoms. Per EMS pt's BS = 451.     Triage Assessment (Adult)       Row Name 11/05/24 9039          Triage Assessment    Airway WDL WDL        Respiratory WDL    Respiratory WDL WDL        Skin Circulation/Temperature WDL    Skin Circulation/Temperature WDL WDL        Cardiac WDL    Cardiac WDL WDL        Peripheral/Neurovascular WDL    Peripheral Neurovascular WDL WDL        Cognitive/Neuro/Behavioral WDL    Cognitive/Neuro/Behavioral WDL WDL

## 2024-11-05 NOTE — ED PROVIDER NOTES
[place] EMERGENCY DEPARTMENT ([facility])    11/05/24       ED PROVIDER NOTE    History     Chief Complaint   Patient presents with    Flu Symptoms     Pt BIB EMS from [facility], [place] for c/o flu like symptoms. Per EMS pt's BS = 451.    Cough     HPI  Andrew Collier is a 58 year old male with history of HIV/AIDS, esophagitis, poorly controlled insulin-dependent DM2, substance use disorder, and multiple recent evaluations and admissions for abdominal pain concerning for gastric outlet obstruction and recurrent SBO who presents to the ED via EMS from [facility] [place] for evaluation of cough.  The patient states he has had a dry cough without sputum production or hemoptysis for the last 24 hours.  He has associated subjective fevers and chills.  Some myalgias.  No significant chest pain.  No abdominal pain nausea or vomiting.  He has a history of esophagitis and is complaining of some heartburn.  Patient is on insulin for diabetes and antiretroviral therapy but states he has lost his medication last week.    Past Medical History  Past Medical History:   Diagnosis Date    Acute appendicitis with localized peritonitis 1/31/2018    AIDS (H)     Allergic rhinitis due to other allergen     DNS    Anal dysplasia     Chronic abdominal pain     CNS toxoplasmosis (H)     COVID-19 1/11/2022    Diabetes type 2, controlled (H)     GERD (gastroesophageal reflux disease)     Herpes zoster 9/23/2016    History of seizure     History of substance abuse (H)     HIV (human immunodeficiency virus infection) (H)     HLD (hyperlipidemia)     Lung nodules     Periungual wart     Pneumonia 1/6/2019    PTSD (post-traumatic stress disorder)     Sleep apnea     doesn't use CPAP     Past Surgical History:   Procedure Laterality Date    ANOSCOPY N/A 9/9/2020    Procedure: Exam Under Anesthesia, ANOSCOPY, fulgeration of rectal fissures with Rectal Biopsies;  Surgeon: Thanh Lundberg MD;  Location: [facility] OR    COLONOSCOPY Left  1/22/2016    Procedure: COMBINED COLONOSCOPY, SINGLE OR MULTIPLE BIOPSY/POLYPECTOMY BY BIOPSY;  Surgeon: Clark Saini MD;  Location: [facility]    ESOPHAGOSCOPY, GASTROSCOPY, DUODENOSCOPY (EGD), COMBINED N/A 6/6/2022    Procedure: ESOPHAGOGASTRODUODENOSCOPY, WITH BIOPSY;  Surgeon: Kecia Benítez MD;  Location: [facility]    ESOPHAGOSCOPY, GASTROSCOPY, DUODENOSCOPY (EGD), COMBINED N/A 10/10/2024    Procedure: Esophagoscopy, gastroscopy, duodenoscopy (EGD), combined;  Surgeon: Nico Bhatti DO;  Location: [facility]    HC EXPLORE UNDESC TESTIS,INGUIN/SCROTAL      LAPAROSCOPIC APPENDECTOMY N/A 1/31/2018    Procedure: LAPAROSCOPIC APPENDECTOMY;  LAPAROSCOPIC APPENDECTOMY;  Surgeon: Dawn Holt MD;  Location: [facility]    LAPAROSCOPY DIAGNOSTIC (GENERAL) N/A 7/26/2016    Procedure: LAPAROSCOPY DIAGNOSTIC (GENERAL);  Surgeon: Susannah Arriaga MD;  [facility]: [facility]    LAPAROSCOPY DIAGNOSTIC (GENERAL) N/A 4/16/2018    Procedure: LAPAROSCOPY DIAGNOSTIC (GENERAL);  Diagnostic laparoscopy and lysis of adhesions;  Surgeon: Prince Dowling MD;  Location: [facility]    OPTICAL TRACKING SYSTEM CRANIOTOMY, EXCISE TUMOR, COMBINED Left 4/10/2015    Procedure: COMBINED OPTICAL TRACKING SYSTEM CRANIOTOMY, EXCISE TUMOR;  Surgeon: Mirlande Colmenares MD;  Location: [facility]    REPAIR GAMEKEEPER'S THUMB Right 12/2/2016    Procedure: REPAIR LIGAMENT ULNAR COLLATERAL THUMB (GAMEKEEPER'S);  Surgeon: Evin Zamorano MD;  [facility]    ZZC NONSPECIFIC PROCEDURE      right forearm fracture     insulin aspart (NOVOLOG PEN) 100 UNIT/ML pen  insulin glargine (LANTUS PEN) 100 UNIT/ML pen  bictegravir-emtricitabine-tenofovir (BIKTARVY) -25 MG per tablet  blood glucose (NO BRAND SPECIFIED) lancets standard  blood glucose (NO BRAND SPECIFIED) test strip  blood glucose monitoring (NO BRAND SPECIFIED) meter device kit  Continuous Glucose Sensor (DEXCOM G6 SENSOR) MISC  Continuous Glucose Sensor (DEXCOM G7 SENSOR)  "MISC  Continuous Glucose Transmitter (DEXCOM G6 TRANSMITTER) MISC  insulin pen needle (32G X 4 MM) 32G X 4 MM miscellaneous  Nutritional Supplements (ENSURE ACTIVE) LIQD  pantoprazole (PROTONIX) 40 MG EC tablet  sennosides (SENOKOT) 8.6 MG tablet      Allergies   Allergen Reactions    Fentanyl Blisters     Per pt, after taking this medication had blisters develope    Tylenol [Acetaminophen] Itching    Dulaglutide Rash    Insulin Lispro Rash     Patient reported    Penicillin V Other (See Comments) and Rash     Diffuse maculopapular rash + feels \"high\", per pt.      Family History  Family History   Problem Relation Age of Onset    Diabetes Brother     Diabetes Father     Alzheimer Disease Father     Unknown/Adopted Mother     Diabetes Paternal Grandfather     Cancer No family hx of         no skin cancer    Skin Cancer No family hx of         no famiy hx of skin cancer    Glaucoma No family hx of     Macular Degeneration No family hx of      Social History   Social History     Tobacco Use    Smoking status: Some Days     Current packs/day: 0.25     Average packs/day: 0.3 packs/day for 40.0 years (10.0 ttl pk-yrs)     Types: Cigarettes    Smokeless tobacco: Former   Substance Use Topics    Alcohol use: No     Alcohol/week: 0.0 standard drinks of alcohol     Comment: Last etoh in 2007    Drug use: Not Currently     Types: Marijuana     Comment: chart indicated meth use but pt denies; states he does not use drugs      Past medical history, past surgical history, medications, allergies, family history, and social history were reviewed with the patient. No additional pertinent items.     A complete review of systems was performed with pertinent positives and negatives noted in the HPI, and all other systems negative.    Physical Exam   BP: (!) 148/82  Pulse: 110  Temp: 98.9  F (37.2  C)  Resp: 17  Height: 162.6 cm (5' 4\")  Weight: 72.6 kg (160 lb)  SpO2: 98 %  Physical Exam  Vital Signs Reviewed  Gen: Well nourished, well " developed, resting comfortably, no acute distress  HEENT: NC/AT, PERRL, EOMI, MMM  Neck: Supple, FROM  CV: Regular Rate  Lungs/Chest: Normal Effort  Abd: Non-distended  MSK/Back: FROM, no visible deformity  Neuro: A&Ox3, GCS 15, CN II-XII unremarkable. Strength and sensation globally intact.  Skin: Warm, Dry, Intact, no visible lesions    ED Course, Procedures, & Data      Procedures            EKG Interpretation:      Interpreted by Osbaldo Gottlieb MD  Time reviewed: 1805  Symptoms at time of EKG: Pain   Rhythm: sinus tachycardia  Rate: 100-110  Axis: Normal  Ectopy: none  Conduction: normal  ST Segments/ T Waves: No ST-T wave changes and No acute ischemic changes  Q Waves: none  Comparison to prior: Interval sinus tachycardia    Clinical Impression: sinus tachycardia, no KATE                 Results for orders placed or performed during the hospital encounter of 11/05/24   XR Chest 2 Views     Status: None    Narrative    EXAM: XR CHEST 2 VIEWS  LOCATION: [facility]  DATE: 11/5/2024    INDICATION: HIV, persistent cough  COMPARISON: None.      Impression    IMPRESSION: Negative chest.   Comprehensive metabolic panel     Status: Abnormal   Result Value Ref Range    Sodium 128 (L) 135 - 145 mmol/L    Potassium 4.2 3.4 - 5.3 mmol/L    Carbon Dioxide (CO2) 26 22 - 29 mmol/L    Anion Gap 10 7 - 15 mmol/L    Urea Nitrogen 21.8 8.0 - 23.0 mg/dL    Creatinine 0.76 0.67 - 1.17 mg/dL    GFR Estimate >90 >60 mL/min/1.73m2    Calcium 9.7 8.8 - 10.4 mg/dL    Chloride 92 (L) 98 - 107 mmol/L    Glucose 464 (H) 70 - 99 mg/dL    Alkaline Phosphatase 152 (H) 40 - 150 U/L    AST 15 0 - 45 U/L    ALT 13 0 - 70 U/L    Protein Total 7.6 6.4 - 8.3 g/dL    Albumin 4.1 3.5 - 5.2 g/dL    Bilirubin Total 0.5 <=1.2 mg/dL   Procalcitonin     Status: Normal   Result Value Ref Range    Procalcitonin 0.09 <0.50 ng/mL   Symptomatic COVID-19 Virus (Coronavirus) by PCR Nasopharyngeal     Status: Normal     Specimen: Nasopharyngeal; Swab   Result Value Ref Range    SARS CoV2 PCR Negative Negative    Narrative    Testing was performed using the Xpert Xpress SARS-CoV-2 Assay on the Cepheid Gene-Xpert Instrument Systems. Additional information about this assay can be found via the Test Directory. This [place] FDA cleared test should be ordered for the detection of SARS-CoV-2 in individuals with signs and symptoms of respiratory tract infection. This test is for in vitro diagnostic use under the [place] FDA for laboratories certified under CLIA to perform high complexity testing. A negative result does not rule out the presence of PCR inhibitors in the specimen or target RNA concentration below the limit of detection for the assay. The possibility of a false negative should be considered if the patient's recent exposure or clinical presentation suggests COVID-19. This test was validated by [facility]. These Laboratories are certified under the  Clinical Laboratory Improvement Amendments (CLIA) as qualified to perform high complexity testing.   Troponin T, High Sensitivity     Status: Normal   Result Value Ref Range    Troponin T, High Sensitivity 14 <=22 ng/L   CBC with platelets and differential     Status: Abnormal   Result Value Ref Range    WBC Count 10.5 4.0 - 11.0 10e3/uL    RBC Count 4.25 (L) 4.40 - 5.90 10e6/uL    Hemoglobin 12.3 (L) 13.3 - 17.7 g/dL    Hematocrit 36.2 (L) 40.0 - 53.0 %    MCV 85 78 - 100 fL    MCH 28.9 26.5 - 33.0 pg    MCHC 34.0 31.5 - 36.5 g/dL    RDW 12.8 10.0 - 15.0 %    Platelet Count 358 150 - 450 10e3/uL    % Neutrophils 65 %    % Lymphocytes 23 %    % Monocytes 8 %    % Eosinophils 4 %    % Basophils 0 %    % Immature Granulocytes 1 %    NRBCs per 100 WBC 0 <1 /100    Absolute Neutrophils 6.8 1.6 - 8.3 10e3/uL    Absolute Lymphocytes 2.4 0.8 - 5.3 10e3/uL    Absolute Monocytes 0.8 0.0 - 1.3 10e3/uL    Absolute Eosinophils 0.4 0.0 - 0.7 10e3/uL    Absolute Basophils 0.0 0.0 -  0.2 10e3/uL    Absolute Immature Granulocytes 0.1 <=0.4 10e3/uL    Absolute NRBCs 0.0 10e3/uL   Troponin T, High Sensitivity     Status: Normal   Result Value Ref Range    Troponin T, High Sensitivity 13 <=22 ng/L   Glucose by meter     Status: Abnormal   Result Value Ref Range    GLUCOSE BY METER POCT 387 (H) 70 - 99 mg/dL   Glucose by meter     Status: Abnormal   Result Value Ref Range    GLUCOSE BY METER POCT 320 (H) 70 - 99 mg/dL   Glucose by meter     Status: Abnormal   Result Value Ref Range    GLUCOSE BY METER POCT 399 (H) 70 - 99 mg/dL   EKG 12-lead, tracing only     Status: None (Preliminary result)   Result Value Ref Range    Systolic Blood Pressure  mmHg    Diastolic Blood Pressure  mmHg    Ventricular Rate 106 BPM    Atrial Rate 106 BPM    IL Interval 122 ms    QRS Duration 84 ms     ms    QTc 433 ms    P Axis 45 degrees    R AXIS -15 degrees    T Axis 32 degrees    Interpretation ECG Sinus tachycardia  Otherwise normal ECG      Respiratory Panel PCR     Status: Abnormal    Specimen: Nasopharyngeal; Swab   Result Value Ref Range    Adenovirus Not Detected Not Detected    Coronavirus Detected (A) Not Detected    Human Metapneumovirus Not Detected Not Detected    Human Rhin/Enterovirus Not Detected Not Detected    Influenza A Not Detected Not Detected    Influenza A, H1 Not Detected Not Detected    Influenza A 2009 H1N1 Not Detected Not Detected    Influenza A, H3 Not Detected Not Detected    Influenza B Not Detected Not Detected    Parainfluenza Virus 1 Not Detected Not Detected    Parainfluenza Virus 2 Not Detected Not Detected    Parainfluenza Virus 3 Not Detected Not Detected    Parainfluenza Virus 4 Not Detected Not Detected    Respiratory Syncytial Virus A Not Detected Not Detected    Respiratory Syncytial Virus B Not Detected Not Detected    Chlamydia Pneumoniae Not Detected Not Detected    Mycoplasma Pneumoniae Not Detected Not Detected    Narrative    The ePlex Respiratory Panel is a  qualitative nucleic acid, multiplex, in vitro diagnostic test for the simultaneous detection and identification of multiple respiratory viral and bacterial nucleic acids in nasopharyngeal swabs collected in viral transport media from individual exhibiting signs and symptoms of respiratory infection. The assay has received FDA approval for the testing of nasopharyngeal (NP) swabs only. This test is used for clinical purposes and should not be regarded as investigational or for research. This laboratory is certified under the Clinical Laboratory Improvement Amendments of 1988 (CLIA-88) as qualified to perform high complexity clinical laboratory testing.   CBC with platelets differential     Status: Abnormal    Narrative    The following orders were created for panel order CBC with platelets differential.  Procedure                               Abnormality         Status                     ---------                               -----------         ------                     CBC with platelets and d...[[id number]]  Abnormal            Final result                 Please view results for these tests on the individual orders.     Medications   benzonatate (TESSALON) capsule 200 mg (200 mg Oral $Given 11/5/24 1820)   lactated ringers BOLUS 1,000 mL (0 mLs Intravenous Stopped 11/5/24 2008)   insulin glargine (LANTUS PEN) injection 28 Units (28 Units Subcutaneous $Given 11/5/24 2009)   insulin aspart (NovoLOG) injection (RAPID ACTING) (5 Units Subcutaneous $Given 11/5/24 2009)   alum & mag hydroxide-simethicone (MAALOX) suspension 15 mL (15 mLs Oral $Given 11/5/24 1820)   lidocaine (viscous) (XYLOCAINE) 2 % solution 10 mL (10 mLs Mouth/Throat $Given 11/5/24 1819)     Labs Ordered and Resulted from Time of ED Arrival to Time of ED Departure   COMPREHENSIVE METABOLIC PANEL - Abnormal       Result Value    Sodium 128 (*)     Potassium 4.2      Carbon Dioxide (CO2) 26      Anion Gap 10      Urea Nitrogen 21.8      Creatinine  0.76      GFR Estimate >90      Calcium 9.7      Chloride 92 (*)     Glucose 464 (*)     Alkaline Phosphatase 152 (*)     AST 15      ALT 13      Protein Total 7.6      Albumin 4.1      Bilirubin Total 0.5     CBC WITH PLATELETS AND DIFFERENTIAL - Abnormal    WBC Count 10.5      RBC Count 4.25 (*)     Hemoglobin 12.3 (*)     Hematocrit 36.2 (*)     MCV 85      MCH 28.9      MCHC 34.0      RDW 12.8      Platelet Count 358      % Neutrophils 65      % Lymphocytes 23      % Monocytes 8      % Eosinophils 4      % Basophils 0      % Immature Granulocytes 1      NRBCs per 100 WBC 0      Absolute Neutrophils 6.8      Absolute Lymphocytes 2.4      Absolute Monocytes 0.8      Absolute Eosinophils 0.4      Absolute Basophils 0.0      Absolute Immature Granulocytes 0.1      Absolute NRBCs 0.0     GLUCOSE BY METER - Abnormal    GLUCOSE BY METER POCT 387 (*)    GLUCOSE BY METER - Abnormal    GLUCOSE BY METER POCT 320 (*)    GLUCOSE BY METER - Abnormal    GLUCOSE BY METER POCT 399 (*)    RESPIRATORY PANEL PCR - Abnormal    Adenovirus Not Detected      Coronavirus Detected (*)     Human Metapneumovirus Not Detected      Human Rhin/Enterovirus Not Detected      Influenza A Not Detected      Influenza A, H1 Not Detected      Influenza A 2009 H1N1 Not Detected      Influenza A, H3 Not Detected      Influenza B Not Detected      Parainfluenza Virus 1 Not Detected      Parainfluenza Virus 2 Not Detected      Parainfluenza Virus 3 Not Detected      Parainfluenza Virus 4 Not Detected      Respiratory Syncytial Virus A Not Detected      Respiratory Syncytial Virus B Not Detected      Chlamydia Pneumoniae Not Detected      Mycoplasma Pneumoniae Not Detected     PROCALCITONIN - Normal    Procalcitonin 0.09     COVID-19 VIRUS (CORONAVIRUS) BY PCR - Normal    SARS CoV2 PCR Negative     TROPONIN T, HIGH SENSITIVITY - Normal    Troponin T, High Sensitivity 14     TROPONIN T, HIGH SENSITIVITY - Normal    Troponin T, High Sensitivity 13      GLUCOSE MONITOR NURSING POCT   T CELL SUBSET PROFILE     XR Chest 2 Views   Final Result   IMPRESSION: Negative chest.             Critical care was not performed.     Medical Decision Making  The patient's presentation was of moderate complexity (an undiagnosed new problem with uncertain prognosis).    The patient's evaluation involved:  ordering and/or review of 3+ test(s) in this encounter (see separate area of note for details)    The patient's management necessitated moderate risk (prescription drug management including medications given in the ED).    Assessment & Plan    Andrew Collier is a 58 year old male with history of HIV/AIDS, esophagitis, poorly controlled insulin-dependent DM2, substance use disorder, and multiple recent evaluations and admissions for abdominal pain concerning for gastric outlet obstruction and recurrent SBO who presents to the ED via EMS from [facility] for evaluation of cough.  On arrival patient is mildly tachycardic, moderately hypertensive, he is afebrile but breathing easily no acute distress.  Occasional cough.  Patient was noted to be hyperglycemic prehospital and he has been out of his medications for a week.  I will administer him IV fluids, give 5 units of short aching insulin and a missed a dose of his long-acting insulin.  Tessalon Perle for cough.  GI cocktail for heartburn.  Twelve-lead EKG showing a mild sinus tachycardia with no significant ischemic changes or significant arrhythmias.  Will check a broad panel of blood work as well as a chest x-ray respiratory virus panel and COVID swab.  Based on his clinical picture anticipate he will discharge unless there is something markedly abnormal discovered on workup.  He will likely need medications represcribed and close outpatient follow-up.    Patient remained stable in the emergency department.  His workup was notable for a coronavirus, non-COVID-19 variant, which is the likely source of his  symptoms.    Patient has refills of his HIV and diabetes medications at the [address] [place] [address] pharmacy, initially these were split between the discharge pharmacy in 909 but is both are closed tonight we will move them to the 909.  Will also send an inhaler and some Tessalon Perles there.    Patient should follow-up with his primary care team.  Return to ED if symptoms worsen.    I have reviewed the nursing notes. I have reviewed the findings, diagnosis, plan and need for follow up with the patient.    Discharge Medication List as of 11/5/2024 11:58 PM          Final diagnoses:   Viral URI with cough   Hyperglycemia       Osbaldo Gottlieb Jr., MD   [facility] EMERGENCY DEPARTMENT  11/5/2024     Osbaldo Gottlieb MD  11/06/24 0114

## 2024-11-06 LAB
ATRIAL RATE - MUSE: 106 BPM
CD3 CELLS # BLD: 1547 CELLS/UL (ref 603–2990)
CD3 CELLS NFR BLD: 68 % (ref 49–84)
CD3+CD4+ CELLS # BLD: 432 CELLS/UL (ref 441–2156)
CD3+CD4+ CELLS NFR BLD: 19 % (ref 28–63)
CD3+CD4+ CELLS/CD3+CD8+ CLL BLD: 0.42 % (ref 1.4–2.6)
CD3+CD8+ CELLS # BLD: 1035 CELLS/UL (ref 125–1312)
CD3+CD8+ CELLS NFR BLD: 46 % (ref 10–40)
DIASTOLIC BLOOD PRESSURE - MUSE: NORMAL MMHG
INTERPRETATION ECG - MUSE: NORMAL
P AXIS - MUSE: 45 DEGREES
PR INTERVAL - MUSE: 122 MS
QRS DURATION - MUSE: 84 MS
QT - MUSE: 326 MS
QTC - MUSE: 433 MS
R AXIS - MUSE: -15 DEGREES
SYSTOLIC BLOOD PRESSURE - MUSE: NORMAL MMHG
T AXIS - MUSE: 32 DEGREES
T CELL COMMENT: ABNORMAL
VENTRICULAR RATE- MUSE: 106 BPM

## 2024-11-06 RX ORDER — BENZONATATE 100 MG/1
100 CAPSULE ORAL 3 TIMES DAILY PRN
Qty: 21 CAPSULE | Refills: 0 | Status: ON HOLD | OUTPATIENT
Start: 2024-11-06 | End: 2024-11-14

## 2024-11-06 RX ORDER — ALBUTEROL SULFATE 90 UG/1
2 INHALANT RESPIRATORY (INHALATION) EVERY 6 HOURS PRN
Qty: 18 G | Refills: 0 | Status: SHIPPED | OUTPATIENT
Start: 2024-11-06

## 2024-11-06 NOTE — DISCHARGE INSTRUCTIONS
Thank you for coming to the [facility].  You were seen in the emergency department.  Your symptoms are likely due to a viral URI.  You tested positive for a coronavirus.  Please note this is not the COVID-19 coronavirus, but rather a different respiratory illness.    Review of your medical records suggest that you should have refills for your HIV medication as well as diabetes supplies at the [address] [place] [address] pharmacy.  Please go there tomorrow to  additional medications.    Please continue to follow-up closely with your outpatient care team.  If you develop new or concerning symptoms please do not hesitate to return to the emergency department.

## 2024-11-09 ENCOUNTER — HOSPITAL ENCOUNTER (EMERGENCY)
Facility: CLINIC | Age: 59
Discharge: HOME | End: 2024-11-09
Attending: EMERGENCY MEDICINE | Admitting: EMERGENCY MEDICINE
Payer: COMMERCIAL

## 2024-11-09 VITALS
TEMPERATURE: 97.3 F | DIASTOLIC BLOOD PRESSURE: 70 MMHG | SYSTOLIC BLOOD PRESSURE: 107 MMHG | RESPIRATION RATE: 16 BRPM | OXYGEN SATURATION: 96 % | HEART RATE: 61 BPM

## 2024-11-09 DIAGNOSIS — H10.32 ACUTE CONJUNCTIVITIS OF LEFT EYE, UNSPECIFIED ACUTE CONJUNCTIVITIS TYPE: ICD-10-CM

## 2024-11-09 DIAGNOSIS — G57.93 NEUROPATHY OF BOTH FEET: ICD-10-CM

## 2024-11-09 PROCEDURE — 99283 EMERGENCY DEPT VISIT LOW MDM: CPT | Performed by: EMERGENCY MEDICINE

## 2024-11-09 PROCEDURE — 99284 EMERGENCY DEPT VISIT MOD MDM: CPT | Performed by: EMERGENCY MEDICINE

## 2024-11-09 PROCEDURE — 250N000009 HC RX 250: Performed by: EMERGENCY MEDICINE

## 2024-11-09 RX ORDER — PROPARACAINE HYDROCHLORIDE 5 MG/ML
1 SOLUTION/ DROPS OPHTHALMIC ONCE
Status: COMPLETED | OUTPATIENT
Start: 2024-11-09 | End: 2024-11-09

## 2024-11-09 RX ORDER — NAPROXEN 500 MG/1
500 TABLET ORAL 2 TIMES DAILY PRN
Qty: 30 TABLET | Refills: 0 | Status: SHIPPED | OUTPATIENT
Start: 2024-11-09

## 2024-11-09 RX ORDER — POLYMYXIN B SULFATE AND TRIMETHOPRIM 1; 10000 MG/ML; [USP'U]/ML
2 SOLUTION OPHTHALMIC
Qty: 10 ML | Refills: 0 | Status: SHIPPED | OUTPATIENT
Start: 2024-11-09

## 2024-11-09 RX ADMIN — PROPARACAINE HYDROCHLORIDE 1 DROP: 5 SOLUTION/ DROPS OPHTHALMIC at 07:06

## 2024-11-09 ASSESSMENT — ACTIVITIES OF DAILY LIVING (ADL)
ADLS_ACUITY_SCORE: 0

## 2024-11-09 NOTE — ED TRIAGE NOTES
Patient presents with bilateral foot pain and tingling. He is unsure why he is having this.     Patient is very animated in triage, flight of ideas and difficult to redirect.      Triage Assessment (Adult)       Row Name 11/09/24 0540          Triage Assessment    Airway WDL WDL        Respiratory WDL    Respiratory WDL WDL        Skin Circulation/Temperature WDL    Skin Circulation/Temperature WDL WDL        Cardiac WDL    Cardiac WDL WDL        Peripheral/Neurovascular WDL    Peripheral Neurovascular WDL WDL        Cognitive/Neuro/Behavioral WDL    Cognitive/Neuro/Behavioral WDL WDL

## 2024-11-09 NOTE — DISCHARGE INSTRUCTIONS
Instructions from your doctor today:  Emergency Department (ED) testing is focused on the potential causes of your symptoms that are the most dangerous possibilities, and cannot cover every possibility. Based on the evaluation, it was deemed sufficiently safe to discharge and continue management through the clinics. Thus, follow-up is very important to assess for improvement/worsening, potential further testing, and potential treatment adjustments. If you were given opioid pain medications or other medications that can make you drowsy while in the ED, you should not drive for at least several hours and not until you feel completely back to normal.     Please make an appointment to follow up with:  - [facility] (phone: [phone]) in 2-3 days  - If you do not have a primary care provider, you can be seen in follow-up and establish care by calling any of the clinics below:     - [facility] (phone: [phone])     - Primary Care / [facility] (phone: [phone])   - Have your clinic provider review the results from today's visit with you again, including any potential follow-up or additional testing that may be needed based on the results. Occasionally, incidental findings are found on later review by radiologists that may need follow-up.     Return to the Emergency Department immediately if you have worsening symptoms, or any other urgent health concerns.

## 2024-11-09 NOTE — PROGRESS NOTES
Patient left before receiving AVS. PT was found by writer and security rolling up what appeared to be tobacco in the lobby.

## 2024-11-09 NOTE — ED PROVIDER NOTES
History     Chief Complaint   Patient presents with    Foot Pain     HPI  Andrew Collier is a 58 year old male with PMH notable for HIV, amphetamine use disorder, SBO, DM2, toxoplasma encephalitis, frequent ED presentations (8 others in the past month)  who presents to the ED with painful feet.  Patient reports of burning pain that he attributes to neuropathy.  Pain is in both feet primarily on the plantar surface.  No injury to the feet.  Pain does not radiate.    Patient also reports redness in the left eye with some irritation.  Symptoms started this past day.  No injury to the eye.  Patient denies change in vision.    Physical Exam   BP: 107/70  Pulse: 61  Temp: 97.3  F (36.3  C)  Resp: 16  SpO2: 96 %    Physical Exam  General: no acute distress. Appears stated age.  Acting somewhat erratically with frequent twitching.  HENT: MMM, no oropharyngeal lesions  Eyes: PERRL, normal sclerae on right, injected sclera on the left.  Ocular pressure 13 on the left.  Slit-lamp: No visualized foreign body, quiet anterior chamber, scattered bits of fluorescein uptake.  Cardio: Regular rate. Regular rhythm. Extremities well perfused  Resp: Normal work of breathing, Normal respiratory rate.   Abdomen: no tenderness, non-distended, no rebound, no guarding  Neuro: alert without signs of confusion. CN II-XII grossly intact. Grossly normal strength and sensation in all extremities.   Psych: Anxious affect, somewhat erratic behavior  MSK/skin: Feet without skin lesions, erythema, nor significant tenderness    ED Course      Procedures              Labs Ordered and Resulted from Time of ED Arrival to Time of ED Departure - No data to display  No orders to display        Medical Decision Making  The patient's presentation was of moderate complexity (two acute problems).    The patient's evaluation involved:  Review of internal notes 11/5/2024 ED visit    The patient's management necessitated moderate risk (prescription drug  management including medications given in the ED).      Assessments & Plan   Patient presenting with burning pain in the feet, also having redness of his left eye without vision change. Vitals in the ED unremarkable. Nursing notes reviewed.  Feet discomfort is consistent with neuropathy.    Left eye pressure normal.  Pain relieved for proparacaine drops.  There was some scattered fluorescein uptake over the cornea without particular pattern.  Patient reports no vision change.  He does not wear contact lenses.    The complete clinical picture is most consistent with neuropathy and conjunctivitis. After counseling on the diagnosis, work-up, and treatment plan, the patient was discharged to home. The patient was advised to follow-up with ophthalmology clinic in 2 days, and with primary care in a few days. The patient was advised to return to the ED if worsening symptoms, vision change, or any urgent health concerns.     Final diagnoses:   Acute conjunctivitis of left eye, unspecified acute conjunctivitis type   Neuropathy of both feet     Discharge Medication List as of 11/9/2024  7:18 AM        START taking these medications    Details   naproxen (NAPROSYN) 500 MG tablet Take 1 tablet (500 mg) by mouth 2 times daily as needed for moderate pain. Take with meals., Disp-30 tablet, R-0, E-Prescribe      polymixin b-trimethoprim (POLYTRIM) 33220-0.1 UNIT/ML-% ophthalmic solution Place 2 drops Into the left eye every 2 hours (while awake)., Disp-10 mL, R-0, E-Prescribe           --  Lázaro Callejas MD   Emergency Medicine   [facility] EMERGENCY DEPARTMENT  11/9/2024       Lázaro Callejas MD  11/11/24 [id number]

## 2024-11-11 ENCOUNTER — HOSPITAL ENCOUNTER (INPATIENT)
Facility: CLINIC | Age: 59
LOS: 3 days | Discharge: LEFT BEFORE DISCHARGE | End: 2024-11-14
Attending: EMERGENCY MEDICINE
Payer: COMMERCIAL

## 2024-11-11 ENCOUNTER — APPOINTMENT (OUTPATIENT)
Dept: CT IMAGING | Facility: CLINIC | Age: 59
End: 2024-11-11
Attending: PHYSICIAN ASSISTANT
Payer: COMMERCIAL

## 2024-11-11 ENCOUNTER — APPOINTMENT (OUTPATIENT)
Dept: GENERAL RADIOLOGY | Facility: CLINIC | Age: 59
End: 2024-11-11
Attending: PHYSICIAN ASSISTANT
Payer: COMMERCIAL

## 2024-11-11 DIAGNOSIS — E11.10 DIABETIC KETOSIS (H): ICD-10-CM

## 2024-11-11 DIAGNOSIS — E11.69 TYPE 2 DIABETES MELLITUS WITH OTHER SPECIFIED COMPLICATION, WITH LONG-TERM CURRENT USE OF INSULIN (H): ICD-10-CM

## 2024-11-11 DIAGNOSIS — Z79.4 TYPE 2 DIABETES MELLITUS WITH OTHER SPECIFIED COMPLICATION, WITH LONG-TERM CURRENT USE OF INSULIN (H): ICD-10-CM

## 2024-11-11 DIAGNOSIS — K56.609 SMALL BOWEL OBSTRUCTION (H): ICD-10-CM

## 2024-11-11 LAB
ALBUMIN SERPL BCG-MCNC: 4.6 G/DL (ref 3.5–5.2)
ALP SERPL-CCNC: 178 U/L (ref 40–150)
ALT SERPL W P-5'-P-CCNC: 14 U/L (ref 0–70)
ANION GAP SERPL CALCULATED.3IONS-SCNC: 18 MMOL/L (ref 7–15)
AST SERPL W P-5'-P-CCNC: 17 U/L (ref 0–45)
B-OH-BUTYR SERPL-SCNC: 1.63 MMOL/L
BASE EXCESS BLDV CALC-SCNC: 1.2 MMOL/L (ref -3–3)
BASOPHILS # BLD AUTO: 0 10E3/UL (ref 0–0.2)
BASOPHILS NFR BLD AUTO: 0 %
BILIRUB SERPL-MCNC: 0.8 MG/DL
BUN SERPL-MCNC: 20.6 MG/DL (ref 8–23)
CALCIUM SERPL-MCNC: 10.1 MG/DL (ref 8.8–10.4)
CHLORIDE SERPL-SCNC: 98 MMOL/L (ref 98–107)
CREAT SERPL-MCNC: 0.74 MG/DL (ref 0.67–1.17)
EGFRCR SERPLBLD CKD-EPI 2021: >90 ML/MIN/1.73M2
EOSINOPHIL # BLD AUTO: 0 10E3/UL (ref 0–0.7)
EOSINOPHIL NFR BLD AUTO: 0 %
ERYTHROCYTE [DISTWIDTH] IN BLOOD BY AUTOMATED COUNT: 13.2 % (ref 10–15)
GLUCOSE BLDC GLUCOMTR-MCNC: 280 MG/DL (ref 70–99)
GLUCOSE SERPL-MCNC: 438 MG/DL (ref 70–99)
HCO3 BLDV-SCNC: 23 MMOL/L (ref 21–28)
HCO3 SERPL-SCNC: 20 MMOL/L (ref 22–29)
HCT VFR BLD AUTO: 41.8 % (ref 40–53)
HGB BLD-MCNC: 14.4 G/DL (ref 13.3–17.7)
IMM GRANULOCYTES # BLD: 0.1 10E3/UL
IMM GRANULOCYTES NFR BLD: 1 %
LACTATE SERPL-SCNC: 1.8 MMOL/L (ref 0.7–2)
LIPASE SERPL-CCNC: 40 U/L (ref 13–60)
LYMPHOCYTES # BLD AUTO: 1.9 10E3/UL (ref 0.8–5.3)
LYMPHOCYTES NFR BLD AUTO: 18 %
MCH RBC QN AUTO: 29.6 PG (ref 26.5–33)
MCHC RBC AUTO-ENTMCNC: 34.4 G/DL (ref 31.5–36.5)
MCV RBC AUTO: 86 FL (ref 78–100)
MONOCYTES # BLD AUTO: 0.4 10E3/UL (ref 0–1.3)
MONOCYTES NFR BLD AUTO: 4 %
NEUTROPHILS # BLD AUTO: 7.9 10E3/UL (ref 1.6–8.3)
NEUTROPHILS NFR BLD AUTO: 77 %
NRBC # BLD AUTO: 0 10E3/UL
NRBC BLD AUTO-RTO: 0 /100
O2/TOTAL GAS SETTING VFR VENT: 21 %
OXYHGB MFR BLDV: 51 % (ref 70–75)
PCO2 BLDV: 29 MM HG (ref 40–50)
PH BLDV: 7.51 [PH] (ref 7.32–7.43)
PLATELET # BLD AUTO: 477 10E3/UL (ref 150–450)
PO2 BLDV: 25 MM HG (ref 25–47)
POTASSIUM SERPL-SCNC: 4.1 MMOL/L (ref 3.4–5.3)
PROT SERPL-MCNC: 8.8 G/DL (ref 6.4–8.3)
RBC # BLD AUTO: 4.86 10E6/UL (ref 4.4–5.9)
SAO2 % BLDV: 52.4 % (ref 70–75)
SODIUM SERPL-SCNC: 136 MMOL/L (ref 135–145)
WBC # BLD AUTO: 10.2 10E3/UL (ref 4–11)

## 2024-11-11 PROCEDURE — 96365 THER/PROPH/DIAG IV INF INIT: CPT | Mod: 59 | Performed by: EMERGENCY MEDICINE

## 2024-11-11 PROCEDURE — 99252 IP/OBS CONSLTJ NEW/EST SF 35: CPT | Performed by: SURGERY

## 2024-11-11 PROCEDURE — 74177 CT ABD & PELVIS W/CONTRAST: CPT | Mod: 26 | Performed by: RADIOLOGY

## 2024-11-11 PROCEDURE — 250N000013 HC RX MED GY IP 250 OP 250 PS 637: Performed by: NURSE PRACTITIONER

## 2024-11-11 PROCEDURE — 36415 COLL VENOUS BLD VENIPUNCTURE: CPT | Performed by: PHYSICIAN ASSISTANT

## 2024-11-11 PROCEDURE — 96376 TX/PRO/DX INJ SAME DRUG ADON: CPT | Performed by: EMERGENCY MEDICINE

## 2024-11-11 PROCEDURE — 83690 ASSAY OF LIPASE: CPT | Performed by: PHYSICIAN ASSISTANT

## 2024-11-11 PROCEDURE — 999N000065 XR ABDOMEN PORT 1 VIEW

## 2024-11-11 PROCEDURE — 82010 KETONE BODYS QUAN: CPT | Performed by: PHYSICIAN ASSISTANT

## 2024-11-11 PROCEDURE — 99285 EMERGENCY DEPT VISIT HI MDM: CPT | Mod: FS | Performed by: EMERGENCY MEDICINE

## 2024-11-11 PROCEDURE — 250N000011 HC RX IP 250 OP 636: Performed by: NURSE PRACTITIONER

## 2024-11-11 PROCEDURE — 250N000009 HC RX 250: Performed by: NURSE PRACTITIONER

## 2024-11-11 PROCEDURE — 74177 CT ABD & PELVIS W/CONTRAST: CPT

## 2024-11-11 PROCEDURE — 99222 1ST HOSP IP/OBS MODERATE 55: CPT | Mod: FS

## 2024-11-11 PROCEDURE — 250N000012 HC RX MED GY IP 250 OP 636 PS 637: Performed by: NURSE PRACTITIONER

## 2024-11-11 PROCEDURE — 82805 BLOOD GASES W/O2 SATURATION: CPT | Performed by: PHYSICIAN ASSISTANT

## 2024-11-11 PROCEDURE — 250N000011 HC RX IP 250 OP 636: Performed by: PHYSICIAN ASSISTANT

## 2024-11-11 PROCEDURE — 120N000005 HC R&B MS OVERFLOW UMMC

## 2024-11-11 PROCEDURE — 99285 EMERGENCY DEPT VISIT HI MDM: CPT | Mod: 25 | Performed by: EMERGENCY MEDICINE

## 2024-11-11 PROCEDURE — 99207 PR APP CREDIT; MD BILLING SHARED VISIT: CPT | Mod: FS | Performed by: NURSE PRACTITIONER

## 2024-11-11 PROCEDURE — 83735 ASSAY OF MAGNESIUM: CPT | Performed by: NURSE PRACTITIONER

## 2024-11-11 PROCEDURE — 74018 RADEX ABDOMEN 1 VIEW: CPT | Mod: 26 | Performed by: RADIOLOGY

## 2024-11-11 PROCEDURE — 82247 BILIRUBIN TOTAL: CPT | Performed by: PHYSICIAN ASSISTANT

## 2024-11-11 PROCEDURE — 84155 ASSAY OF PROTEIN SERUM: CPT | Performed by: PHYSICIAN ASSISTANT

## 2024-11-11 PROCEDURE — 96375 TX/PRO/DX INJ NEW DRUG ADDON: CPT | Performed by: EMERGENCY MEDICINE

## 2024-11-11 PROCEDURE — 82962 GLUCOSE BLOOD TEST: CPT

## 2024-11-11 PROCEDURE — 85025 COMPLETE CBC W/AUTO DIFF WBC: CPT | Performed by: PHYSICIAN ASSISTANT

## 2024-11-11 PROCEDURE — 258N000003 HC RX IP 258 OP 636: Performed by: PHYSICIAN ASSISTANT

## 2024-11-11 PROCEDURE — 84100 ASSAY OF PHOSPHORUS: CPT | Performed by: NURSE PRACTITIONER

## 2024-11-11 PROCEDURE — 83605 ASSAY OF LACTIC ACID: CPT | Performed by: PHYSICIAN ASSISTANT

## 2024-11-11 RX ORDER — ONDANSETRON 4 MG/1
4 TABLET, ORALLY DISINTEGRATING ORAL EVERY 6 HOURS PRN
Status: DISCONTINUED | OUTPATIENT
Start: 2024-11-11 | End: 2024-11-14 | Stop reason: HOSPADM

## 2024-11-11 RX ORDER — AMOXICILLIN 250 MG
2 CAPSULE ORAL 2 TIMES DAILY PRN
Status: DISCONTINUED | OUTPATIENT
Start: 2024-11-11 | End: 2024-11-14 | Stop reason: HOSPADM

## 2024-11-11 RX ORDER — DEXTROSE MONOHYDRATE 25 G/50ML
25-50 INJECTION, SOLUTION INTRAVENOUS
Status: DISCONTINUED | OUTPATIENT
Start: 2024-11-11 | End: 2024-11-11

## 2024-11-11 RX ORDER — NICOTINE POLACRILEX 4 MG
15-30 LOZENGE BUCCAL
Status: DISCONTINUED | OUTPATIENT
Start: 2024-11-11 | End: 2024-11-14 | Stop reason: HOSPADM

## 2024-11-11 RX ORDER — AMOXICILLIN 250 MG
1 CAPSULE ORAL 2 TIMES DAILY PRN
Status: DISCONTINUED | OUTPATIENT
Start: 2024-11-11 | End: 2024-11-14 | Stop reason: HOSPADM

## 2024-11-11 RX ORDER — AMOXICILLIN 250 MG
2 CAPSULE ORAL 2 TIMES DAILY
Status: DISCONTINUED | OUTPATIENT
Start: 2024-11-11 | End: 2024-11-14 | Stop reason: HOSPADM

## 2024-11-11 RX ORDER — POTASSIUM CHLORIDE 7.45 MG/ML
10 INJECTION INTRAVENOUS ONCE
Status: COMPLETED | OUTPATIENT
Start: 2024-11-11 | End: 2024-11-11

## 2024-11-11 RX ORDER — ONDANSETRON 2 MG/ML
4 INJECTION INTRAMUSCULAR; INTRAVENOUS EVERY 6 HOURS PRN
Status: DISCONTINUED | OUTPATIENT
Start: 2024-11-11 | End: 2024-11-14 | Stop reason: HOSPADM

## 2024-11-11 RX ORDER — DEXTROSE MONOHYDRATE 25 G/50ML
25-50 INJECTION, SOLUTION INTRAVENOUS
Status: DISCONTINUED | OUTPATIENT
Start: 2024-11-11 | End: 2024-11-14 | Stop reason: HOSPADM

## 2024-11-11 RX ORDER — DEXTROSE MONOHYDRATE AND SODIUM CHLORIDE 5; .45 G/100ML; G/100ML
1000 INJECTION, SOLUTION INTRAVENOUS CONTINUOUS PRN
Status: DISCONTINUED | OUTPATIENT
Start: 2024-11-11 | End: 2024-11-11

## 2024-11-11 RX ORDER — ONDANSETRON 2 MG/ML
4 INJECTION INTRAMUSCULAR; INTRAVENOUS ONCE
Status: COMPLETED | OUTPATIENT
Start: 2024-11-11 | End: 2024-11-11

## 2024-11-11 RX ORDER — CALCIUM CARBONATE 500 MG/1
1000 TABLET, CHEWABLE ORAL 4 TIMES DAILY PRN
Status: DISCONTINUED | OUTPATIENT
Start: 2024-11-11 | End: 2024-11-14 | Stop reason: HOSPADM

## 2024-11-11 RX ORDER — IOPAMIDOL 755 MG/ML
99 INJECTION, SOLUTION INTRAVASCULAR ONCE
Status: COMPLETED | OUTPATIENT
Start: 2024-11-11 | End: 2024-11-11

## 2024-11-11 RX ORDER — LIDOCAINE 40 MG/G
CREAM TOPICAL
Status: DISCONTINUED | OUTPATIENT
Start: 2024-11-11 | End: 2024-11-14 | Stop reason: HOSPADM

## 2024-11-11 RX ORDER — AMOXICILLIN 250 MG
1 CAPSULE ORAL 2 TIMES DAILY
Status: DISCONTINUED | OUTPATIENT
Start: 2024-11-11 | End: 2024-11-14 | Stop reason: HOSPADM

## 2024-11-11 RX ORDER — NAPROXEN 500 MG/1
500 TABLET ORAL 2 TIMES DAILY PRN
Status: DISCONTINUED | OUTPATIENT
Start: 2024-11-11 | End: 2024-11-12

## 2024-11-11 RX ADMIN — SENNOSIDES AND DOCUSATE SODIUM 1 TABLET: 8.6; 5 TABLET ORAL at 23:40

## 2024-11-11 RX ADMIN — ONDANSETRON 4 MG: 2 INJECTION INTRAMUSCULAR; INTRAVENOUS at 16:00

## 2024-11-11 RX ADMIN — SODIUM CHLORIDE, POTASSIUM CHLORIDE, SODIUM LACTATE AND CALCIUM CHLORIDE 1000 ML: 600; 310; 30; 20 INJECTION, SOLUTION INTRAVENOUS at 15:53

## 2024-11-11 RX ADMIN — PANTOPRAZOLE SODIUM 40 MG: 40 INJECTION, POWDER, FOR SOLUTION INTRAVENOUS at 21:21

## 2024-11-11 RX ADMIN — POTASSIUM CHLORIDE 10 MEQ: 7.46 INJECTION, SOLUTION INTRAVENOUS at 19:33

## 2024-11-11 RX ADMIN — HYDROMORPHONE HYDROCHLORIDE 1 MG: 1 INJECTION, SOLUTION INTRAMUSCULAR; INTRAVENOUS; SUBCUTANEOUS at 15:53

## 2024-11-11 RX ADMIN — IOPAMIDOL 99 ML: 755 INJECTION, SOLUTION INTRAVENOUS at 17:30

## 2024-11-11 RX ADMIN — POTASSIUM CHLORIDE 10 MEQ: 7.46 INJECTION, SOLUTION INTRAVENOUS at 21:17

## 2024-11-11 RX ADMIN — INSULIN ASPART 6 UNITS: 100 INJECTION, SOLUTION INTRAVENOUS; SUBCUTANEOUS at 23:50

## 2024-11-11 RX ADMIN — HYDROMORPHONE HYDROCHLORIDE 1 MG: 1 INJECTION, SOLUTION INTRAMUSCULAR; INTRAVENOUS; SUBCUTANEOUS at 23:41

## 2024-11-11 RX ADMIN — HYDROMORPHONE HYDROCHLORIDE 1 MG: 1 INJECTION, SOLUTION INTRAMUSCULAR; INTRAVENOUS; SUBCUTANEOUS at 19:33

## 2024-11-11 ASSESSMENT — ACTIVITIES OF DAILY LIVING (ADL)
ADLS_ACUITY_SCORE: 0

## 2024-11-11 NOTE — ED PROVIDER NOTES
ED Provider Note  [facility]      History     Chief Complaint   Patient presents with    Abdominal Pain    Nausea, Vomiting, & Diarrhea    Hyperglycemia     HPI  Andrew Collier is a 58 year old male with complex past medical history including history of GERD, history of prior partial small bowel obstruction, history of methamphetamine abuse, type 2 diabetes, GERD, history of HIV last CD4 count 432 11/5/2024 who presents the emergency department concerns for abdominal pain and vomiting.    Patient presents alone.  He notes around 10 AM this morning start experiencing lower abdominal pain along with recurrent episodes of vomiting.  He states this feels similar to prior bowel obstructions.  Symptoms without any associated fevers cough dyspnea chest pain or urinary symptoms.  He states symptoms are similar to prior bowel obstructions.    Past Medical History  Past Medical History:   Diagnosis Date    Acute appendicitis with localized peritonitis 1/31/2018    AIDS (H)     Allergic rhinitis due to other allergen     DNS    Anal dysplasia     Chronic abdominal pain     CNS toxoplasmosis (H)     COVID-19 1/11/2022    Diabetes type 2, controlled (H)     GERD (gastroesophageal reflux disease)     Herpes zoster 9/23/2016    History of seizure     History of substance abuse (H)     HIV (human immunodeficiency virus infection) (H)     HLD (hyperlipidemia)     Lung nodules     Periungual wart     Pneumonia 1/6/2019    PTSD (post-traumatic stress disorder)     Sleep apnea     doesn't use CPAP     Past Surgical History:   Procedure Laterality Date    ANOSCOPY N/A 9/9/2020    Procedure: Exam Under Anesthesia, ANOSCOPY, fulgeration of rectal fissures with Rectal Biopsies;  Surgeon: Thanh Lundberg MD;  [facility]: [facility]    COLONOSCOPY Left 1/22/2016    Procedure: COMBINED COLONOSCOPY, SINGLE OR MULTIPLE BIOPSY/POLYPECTOMY BY BIOPSY;  Surgeon: Clark Saini MD;  [facility]     ESOPHAGOSCOPY, GASTROSCOPY, DUODENOSCOPY (EGD), COMBINED N/A 6/6/2022    Procedure: ESOPHAGOGASTRODUODENOSCOPY, WITH BIOPSY;  Surgeon: Kecia Benítez MD;  Location: [facility]    ESOPHAGOSCOPY, GASTROSCOPY, DUODENOSCOPY (EGD), COMBINED N/A 10/10/2024    Procedure: Esophagoscopy, gastroscopy, duodenoscopy (EGD), combined;  Surgeon: Nico Bhatti DO;  Location: [facility]    HC EXPLORE UNDESC TESTIS,INGUIN/SCROTAL      LAPAROSCOPIC APPENDECTOMY N/A 1/31/2018    Procedure: LAPAROSCOPIC APPENDECTOMY;  LAPAROSCOPIC APPENDECTOMY;  Surgeon: Dawn Holt MD;  [facility]    LAPAROSCOPY DIAGNOSTIC (GENERAL) N/A 7/26/2016    Procedure: LAPAROSCOPY DIAGNOSTIC (GENERAL);  Surgeon: Susannah Arriaga MD;  [facility]    LAPAROSCOPY DIAGNOSTIC (GENERAL) N/A 4/16/2018    Procedure: LAPAROSCOPY DIAGNOSTIC (GENERAL);  Diagnostic laparoscopy and lysis of adhesions;  Surgeon: Prince Dowling MD;  Location: [facility]    OPTICAL TRACKING SYSTEM CRANIOTOMY, EXCISE TUMOR, COMBINED Left 4/10/2015    Procedure: COMBINED OPTICAL TRACKING SYSTEM CRANIOTOMY, EXCISE TUMOR;  Surgeon: Mirlande Colmenares MD;  [facility]: [facility]    REPAIR GAMEKEEPER'S THUMB Right 12/2/2016    Procedure: REPAIR LIGAMENT ULNAR COLLATERAL THUMB (GAMEKEEPER'S);  Surgeon: Evin Zamorano MD;  [facility]    ZZC NONSPECIFIC PROCEDURE      right forearm fracture     albuterol (PROAIR HFA/PROVENTIL HFA/VENTOLIN HFA) 108 (90 Base) MCG/ACT inhaler  benzonatate (TESSALON) 100 MG capsule  bictegravir-emtricitabine-tenofovir (BIKTARVY) -25 MG per tablet  blood glucose (NO BRAND SPECIFIED) lancets standard  blood glucose (NO BRAND SPECIFIED) test strip  blood glucose monitoring (NO BRAND SPECIFIED) meter device kit  Continuous Glucose Sensor (DEXCOM G6 SENSOR) MISC  Continuous Glucose Sensor (DEXCOM G7 SENSOR) MISC  Continuous Glucose Transmitter (DEXCOM G6 TRANSMITTER) MISC  insulin aspart (NOVOLOG PEN) 100 UNIT/ML pen  insulin glargine  "(LANTUS PEN) 100 UNIT/ML pen  insulin pen needle (32G X 4 MM) 32G X 4 MM miscellaneous  naproxen (NAPROSYN) 500 MG tablet  Nutritional Supplements (ENSURE ACTIVE) LIQD  pantoprazole (PROTONIX) 40 MG EC tablet  polymixin b-trimethoprim (POLYTRIM) 50836-0.1 UNIT/ML-% ophthalmic solution  sennosides (SENOKOT) 8.6 MG tablet      Allergies   Allergen Reactions    Fentanyl Blisters     Per pt, after taking this medication had blisters develope    Tylenol [Acetaminophen] Itching    Dulaglutide Rash    Insulin Lispro Rash     Patient reported    Penicillin V Other (See Comments) and Rash     Diffuse maculopapular rash + feels \"high\", per pt.      Family History  Family History   Problem Relation Age of Onset    Diabetes Brother     Diabetes Father     Alzheimer Disease Father     Unknown/Adopted Mother     Diabetes Paternal Grandfather     Cancer No family hx of         no skin cancer    Skin Cancer No family hx of         no famiy hx of skin cancer    Glaucoma No family hx of     Macular Degeneration No family hx of      Social History   Social History     Tobacco Use    Smoking status: Some Days     Current packs/day: 0.25     Average packs/day: 0.3 packs/day for 40.0 years (10.0 ttl pk-yrs)     Types: Cigarettes    Smokeless tobacco: Former   Substance Use Topics    Alcohol use: No     Alcohol/week: 0.0 standard drinks of alcohol     Comment: Last etoh in 2007    Drug use: Not Currently     Types: Marijuana     Comment: chart indicated meth use but pt denies; states he does not use drugs      A medically appropriate review of systems was performed with pertinent positives and negatives noted in the HPI, and all other systems negative.    Physical Exam   BP: 129/84  Pulse: 107  Temp: 97.8  F (36.6  C)  Resp: 20  Height: 162.6 cm (5' 4\")  Weight: 72.6 kg (160 lb)  SpO2: 99 %  Physical Exam  GENERAL APPEARANCE: The patient is well developed, well appearing, and in no acute distress.  HEAD:  Normocephalic and atraumatic. "   EENT: Voice normal.  NECK: Trachea is midline.  LUNGS: Breath sounds are equal and clear bilaterally. No wheezes, rhonchi, or rales.  HEART: Regular rate and normal rhythm.  Radial pulses 2+ bilaterally.  ABDOMEN: Focal tenderness noted to palpation of the left hemiabdomen.  No rebound or rigidity.  No right-sided tenderness.  No Mendes sign.  Patient is actively vomiting.  EXTREMITIES: No cyanosis, clubbing, or edema.  NEUROLOGIC: No focal sensory or motor deficits are noted.  PSYCHIATRIC: The patient is awake, alert.  Appropriate mood and affect.  SKIN: Warm, dry, and well perfused. Good turgor.      ED Course, Procedures, & Data     ED Course as of 11/11/24 2202   Mon Nov 11, 2024   1939 Called by radiologist in regards to over read of CT.  They note multiple loops of small bowel in close proximity which per radiologist qualifies as closed-loop obstruction.  General surgery repaged.          Results for orders placed or performed during the hospital encounter of 11/11/24   CT Abdomen Pelvis w Contrast     Status: None    Narrative    EXAMINATION: CT ABDOMEN PELVIS W CONTRAST, 11/11/2024 5:45 PM    INDICATION: Left-sided abdominal pain vomiting history of multiple  bowel obstructions    COMPARISON STUDY: CT 10/22/2024    TECHNIQUE: CT scan of the abdomen and pelvis was performed on  multidetector CT scanner using volumetric acquisition technique and  images were reconstructed in multiple planes with variable thickness  and reviewed on dedicated workstations.     CONTRAST: iopamidol (ISOVUE-370) solution 99 mL IV without oral  contrast    CT scan radiation dose is optimized to minimum requisite dose using  automated dose modulation techniques.    FINDINGS:    Lower thorax: Bibasilar dependent subsegmental atelectasis.    Liver: No mass. No intrahepatic biliary ductal dilation.    Biliary System: Cholelithiasis. No extrahepatic biliary dilation.    Pancreas: No mass or pancreatic ductal dilation.    Adrenal  glands: No mass or nodules    Spleen: Normal.    Kidneys: No suspicious mass, obstructing calculus or hydronephrosis.    Gastrointestinal tract: Multiple dilated, fluid-filled loops of small  bowel within the left upper quadrant, measuring up to 5 cm. Transition  point likely within the left upper quadrant series 4 images 188  through 208. There is also a relative transition point in the right  abdomen, series 4 images 140 through 156. Bowel loops upstream and  downstream from these regions are difficult to follow. The duodenum  and upstream jejunum are not distended. Areas of questionable  hyperemia and thickening in bowel loops in the mid to lower left  abdomen for example series 4 image 230. No signs of enteritis noted on  recent MRI enterography 10/26/2024 suggesting some of this may be  related to peristalsis. No wall thickening of dilated loop however  there is some mild adjacent fat stranding, series 4 image 142. No  evidence of perforation. Large bowel is unremarkable. Diverticulosis.  History of appendectomy with likely small residual appendix (series 4,  image 257).    Pelvis: Urinary bladder is normal. Penile implant with collapsed  reservoir, stable in appearance.     Mesentery/peritoneum/retroperitoneum: No mass. No free air.    Lymph nodes: No significant lymphadenopathy.    Vasculature: Scattered atherosclerotic calcification of abdominal  aorta with no aneurysmal dilation.     Soft tissues: Within normal limits.    Osseous structures: No aggressive or acute osseous lesion.      Impression    IMPRESSION:   1.  Small bowel obstruction centered about the left upper quadrant  with segmental dilatation of loops of small bowel measuring up to 5  cm. No evidence of perforation. There are 2 transition points  suspected as discussed above with intervening dilated segment of small  bowel with upstream and downstream collapse. Correlation with  patient's symptoms and presentation for developing  closed-loop  obstruction given findings. This may be secondary to adhesions given  no abnormality noted on recent MRI enterography. Similar appearance is  also noted on several prior CT studies.  2.  Other chronic findings as above.    Findings discussed with the emergency room physician by Dr. Jose Ramon Alvarez at 7:35 PM 11/11/2024. Provider will relay findings to  general surgery consult team.    I have personally reviewed the examination and initial interpretation  and I agree with the findings.    APRIL ELIZONDO MD         SYSTEM ID:  [id number]   XR Abdomen Port 1 View     Status: None    Narrative    EXAM: XR ABDOMEN PORT 1 VIEW  LOCATION: [facility]  DATE: 11/11/2024    INDICATION: NGT placement  COMPARISON: CT abdomen/pelvis 11/11/2024.      Impression    IMPRESSION: Nasogastric tube with tip and sidehole overlying the stomach. Excreted contrast noted in the renal system bilaterally. Known mechanical obstruction better seen on same day CT. Visualized lung bases are clear. No acute bony abnormality.   Comprehensive metabolic panel     Status: Abnormal   Result Value Ref Range    Sodium 136 135 - 145 mmol/L    Potassium 4.1 3.4 - 5.3 mmol/L    Carbon Dioxide (CO2) 20 (L) 22 - 29 mmol/L    Anion Gap 18 (H) 7 - 15 mmol/L    Urea Nitrogen 20.6 8.0 - 23.0 mg/dL    Creatinine 0.74 0.67 - 1.17 mg/dL    GFR Estimate >90 >60 mL/min/1.73m2    Calcium 10.1 8.8 - 10.4 mg/dL    Chloride 98 98 - 107 mmol/L    Glucose 438 (H) 70 - 99 mg/dL    Alkaline Phosphatase 178 (H) 40 - 150 U/L    AST 17 0 - 45 U/L    ALT 14 0 - 70 U/L    Protein Total 8.8 (H) 6.4 - 8.3 g/dL    Albumin 4.6 3.5 - 5.2 g/dL    Bilirubin Total 0.8 <=1.2 mg/dL   Lactic acid whole blood     Status: Normal   Result Value Ref Range    Lactic Acid 1.8 0.7 - 2.0 mmol/L   Ketone Beta-Hydroxybutyrate Quantitative     Status: Abnormal   Result Value Ref Range    Ketone (Beta-Hydroxybutyrate) Quantitative 1.63 (HH)  <=0.30 mmol/L   Lipase     Status: Normal   Result Value Ref Range    Lipase 40 13 - 60 U/L   Blood gas venous     Status: Abnormal   Result Value Ref Range    pH Venous 7.51 (H) 7.32 - 7.43    pCO2 Venous 29 (L) 40 - 50 mm Hg    pO2 Venous 25 25 - 47 mm Hg    Bicarbonate Venous 23 21 - 28 mmol/L    Base Excess/Deficit Venous 1.2 -3.0 - 3.0 mmol/L    FIO2 21     Oxyhemoglobin Venous 51 (L) 70 - 75 %    O2 Sat, Venous 52.4 (L) 70.0 - 75.0 %    Narrative    In healthy individuals, oxyhemoglobin (O2Hb) and oxygen saturation (SO2) are approximately equal. In the presence of dyshemoglobins, oxyhemoglobin can be considerably lower than oxygen saturation.   CBC with platelets and differential     Status: Abnormal   Result Value Ref Range    WBC Count 10.2 4.0 - 11.0 10e3/uL    RBC Count 4.86 4.40 - 5.90 10e6/uL    Hemoglobin 14.4 13.3 - 17.7 g/dL    Hematocrit 41.8 40.0 - 53.0 %    MCV 86 78 - 100 fL    MCH 29.6 26.5 - 33.0 pg    MCHC 34.4 31.5 - 36.5 g/dL    RDW 13.2 10.0 - 15.0 %    Platelet Count 477 (H) 150 - 450 10e3/uL    % Neutrophils 77 %    % Lymphocytes 18 %    % Monocytes 4 %    % Eosinophils 0 %    % Basophils 0 %    % Immature Granulocytes 1 %    NRBCs per 100 WBC 0 <1 /100    Absolute Neutrophils 7.9 1.6 - 8.3 10e3/uL    Absolute Lymphocytes 1.9 0.8 - 5.3 10e3/uL    Absolute Monocytes 0.4 0.0 - 1.3 10e3/uL    Absolute Eosinophils 0.0 0.0 - 0.7 10e3/uL    Absolute Basophils 0.0 0.0 - 0.2 10e3/uL    Absolute Immature Granulocytes 0.1 <=0.4 10e3/uL    Absolute NRBCs 0.0 10e3/uL   CBC with platelets differential     Status: Abnormal    Narrative    The following orders were created for panel order CBC with platelets differential.  Procedure                               Abnormality         Status                     ---------                               -----------         ------                     CBC with platelets and d...[[id number]]  Abnormal            Final result                 Please view results for  these tests on the individual orders.     Medications   potassium chloride 10 mEq in 100 mL sterile water infusion (10 mEq Intravenous $New Bag 11/11/24 2117)   pantoprazole (PROTONIX) IV push injection 40 mg (40 mg Intravenous $Given 11/11/24 2121)   glucose gel 15-30 g (has no administration in time range)     Or   dextrose 50 % injection 25-50 mL (has no administration in time range)     Or   glucagon injection 1 mg (has no administration in time range)   insulin aspart (NovoLOG) injection (RAPID ACTING) (has no administration in time range)   HYDROmorphone (DILAUDID) injection 1 mg (has no administration in time range)   insulin glargine (LANTUS PEN) injection 20 Units (has no administration in time range)   lactated ringers BOLUS 1,000 mL (0 mLs Intravenous Stopped 11/11/24 1950)   ondansetron (ZOFRAN) injection 4 mg (4 mg Intravenous $Given 11/11/24 1600)   HYDROmorphone (DILAUDID) injection 1 mg (1 mg Intravenous $Given 11/11/24 1553)   sodium chloride (PF) 0.9% PF flush 75 mL (75 mLs Intravenous $Given 11/11/24 1730)   iopamidol (ISOVUE-370) solution 99 mL (99 mLs Intravenous $Given 11/11/24 1730)   potassium chloride 10 mEq in 100 mL sterile water infusion ( Intravenous Rate/Dose Change 11/11/24 2033)   HYDROmorphone (DILAUDID) injection 1 mg (1 mg Intravenous $Given 11/11/24 1933)     Labs Ordered and Resulted from Time of ED Arrival to Time of ED Departure   COMPREHENSIVE METABOLIC PANEL - Abnormal       Result Value    Sodium 136      Potassium 4.1      Carbon Dioxide (CO2) 20 (*)     Anion Gap 18 (*)     Urea Nitrogen 20.6      Creatinine 0.74      GFR Estimate >90      Calcium 10.1      Chloride 98      Glucose 438 (*)     Alkaline Phosphatase 178 (*)     AST 17      ALT 14      Protein Total 8.8 (*)     Albumin 4.6      Bilirubin Total 0.8     KETONE BETA-HYDROXYBUTYRATE QUANTITATIVE, RAPID - Abnormal    Ketone (Beta-Hydroxybutyrate) Quantitative 1.63 (*)    BLOOD GAS VENOUS - Abnormal    pH Venous  7.51 (*)     pCO2 Venous 29 (*)     pO2 Venous 25      Bicarbonate Venous 23      Base Excess/Deficit Venous 1.2      FIO2 21      Oxyhemoglobin Venous 51 (*)     O2 Sat, Venous 52.4 (*)    CBC WITH PLATELETS AND DIFFERENTIAL - Abnormal    WBC Count 10.2      RBC Count 4.86      Hemoglobin 14.4      Hematocrit 41.8      MCV 86      MCH 29.6      MCHC 34.4      RDW 13.2      Platelet Count 477 (*)     % Neutrophils 77      % Lymphocytes 18      % Monocytes 4      % Eosinophils 0      % Basophils 0      % Immature Granulocytes 1      NRBCs per 100 WBC 0      Absolute Neutrophils 7.9      Absolute Lymphocytes 1.9      Absolute Monocytes 0.4      Absolute Eosinophils 0.0      Absolute Basophils 0.0      Absolute Immature Granulocytes 0.1      Absolute NRBCs 0.0     LACTIC ACID WHOLE BLOOD - Normal    Lactic Acid 1.8     LIPASE - Normal    Lipase 40     ROUTINE UA WITH MICROSCOPIC REFLEX TO CULTURE   KETONE BETA-HYDROXYBUTYRATE QUANTITATIVE, RAPID   GLUCOSE MONITOR NURSING POCT   GLUCOSE MONITOR NURSING POCT   GLUCOSE BY METER POCT     XR Abdomen Port 1 View   Final Result   IMPRESSION: Nasogastric tube with tip and sidehole overlying the stomach. Excreted contrast noted in the renal system bilaterally. Known mechanical obstruction better seen on same day CT. Visualized lung bases are clear. No acute bony abnormality.      CT Abdomen Pelvis w Contrast   Final Result   IMPRESSION:    1.  Small bowel obstruction centered about the left upper quadrant   with segmental dilatation of loops of small bowel measuring up to 5   cm. No evidence of perforation. There are 2 transition points   suspected as discussed above with intervening dilated segment of small   bowel with upstream and downstream collapse. Correlation with   patient's symptoms and presentation for developing closed-loop   obstruction given findings. This may be secondary to adhesions given   no abnormality noted on recent MRI enterography. Similar appearance is    also noted on several prior CT studies.   2.  Other chronic findings as above.      Findings discussed with the emergency room physician by Dr. Jose Ramon Alvarez at 7:35 PM 11/11/2024. Provider will relay findings to   general surgery consult team.      I have personally reviewed the examination and initial interpretation   and I agree with the findings.      APRIL ELIZONDO MD            SYSTEM ID:  [id number]             Critical care was not performed.     Medical Decision Making  The patient's presentation was of high complexity (a chronic illness severe exacerbation, progression, or side effect of treatment).    The patient's evaluation involved:  ordering and/or review of 3+ test(s) in this encounter (see separate area of note for details)  discussion of management or test interpretation with another health professional (general surgery, hospitalist)    The patient's management necessitated moderate risk (prescription drug management including medications given in the ED), moderate risk (IV contrast administration), high risk (a parenteral controlled substance), and high risk (a decision regarding hospitalization).    Assessment & Plan    This is a medically complex 58-year-old male with recurrent bowel obstructions presenting with concerns for several hours of left-sided abdominal pain and recurrent emesis per patient similar to prior bowel obstructions.  On presentation to the department he is tachycardic 107 heart rate, afebrile and normoxic.  He is actively vomiting.  He has left-sided tenderness to palpation without rebound.  He appears uncomfortable.  Consider broad differential including possible recurrent bowel obstruction, perforated viscus, pancreatitis, diverticulitis, UTI nephrolithiasis amongst others in the differential.  In addition patient is diabetic considered possible DKA as well with the recurrent episodes of emesis.  Broad workup initiated including DKA laboratory studies and CT  abdomen pelvis with contrast.  Patient given fluids and pain medications while awaiting test results.    Labs reviewed, lactic acid normal.  CBC without leukocytosis or anemia.  VBG was obtained and actually shows alkalosis, suspect due to hyperventilation related to patient's abdominal pain.  Chemistry without JULIANNE patient hyperglycemic 438, anion gap 18 with ketones 1.63.  This is suggestive of a diabetic ketosis type picture.  Lipase negative suggest against pancreatitis.  CT abdomen pelvis returns with multiple small bowel loops with 2 transition points per radiologist correlate with symptoms for developing closed-loop obstruction.  Discussed these results with the general surgery service.  They evaluated the patient at bedside.  They recommend mission to medicine and will follow.  NG was ordered and placed, confirmed appropriate and put on low intermittent suction.  Patient given additional pain medications for his difficulties.    Patient seen and discussed with attending physician Dr. Mauro, who agrees with my plan of care.      I have reviewed the nursing notes. I have reviewed the findings, diagnosis, plan and need for follow up with the patient.    New Prescriptions    No medications on file       Final diagnoses:   Small bowel obstruction (H)   Diabetic ketosis (H)       Adri Darek, PAC  [facility] EMERGENCY DEPARTMENT  11/11/2024     Adri Oswald PA-C  11/11/24 [id number]

## 2024-11-11 NOTE — ED TRIAGE NOTES
Pt BIBA from clinic for abdominal pain, nausea and vomiting r/t cancer per pt. Bg 450, diabetic, non-compliant with medication. 20g PIV placed en route.

## 2024-11-12 ENCOUNTER — APPOINTMENT (OUTPATIENT)
Dept: GENERAL RADIOLOGY | Facility: CLINIC | Age: 59
End: 2024-11-12
Attending: STUDENT IN AN ORGANIZED HEALTH CARE EDUCATION/TRAINING PROGRAM
Payer: COMMERCIAL

## 2024-11-12 LAB
ALBUMIN SERPL BCG-MCNC: 3.6 G/DL (ref 3.5–5.2)
ALBUMIN UR-MCNC: 50 MG/DL
ALP SERPL-CCNC: 111 U/L (ref 40–150)
ALT SERPL W P-5'-P-CCNC: 11 U/L (ref 0–70)
ANION GAP SERPL CALCULATED.3IONS-SCNC: 13 MMOL/L (ref 7–15)
APPEARANCE UR: CLEAR
AST SERPL W P-5'-P-CCNC: 17 U/L (ref 0–45)
B-OH-BUTYR SERPL-SCNC: <0.18 MMOL/L
BILIRUB SERPL-MCNC: 0.6 MG/DL
BILIRUB UR QL STRIP: NEGATIVE
BUN SERPL-MCNC: 15.7 MG/DL (ref 8–23)
CALCIUM SERPL-MCNC: 9.1 MG/DL (ref 8.8–10.4)
CHLORIDE SERPL-SCNC: 105 MMOL/L (ref 98–107)
COLOR UR AUTO: YELLOW
CREAT SERPL-MCNC: 0.71 MG/DL (ref 0.67–1.17)
EGFRCR SERPLBLD CKD-EPI 2021: >90 ML/MIN/1.73M2
GLUCOSE BLDC GLUCOMTR-MCNC: 127 MG/DL (ref 70–99)
GLUCOSE BLDC GLUCOMTR-MCNC: 134 MG/DL (ref 70–99)
GLUCOSE BLDC GLUCOMTR-MCNC: 158 MG/DL (ref 70–99)
GLUCOSE BLDC GLUCOMTR-MCNC: 197 MG/DL (ref 70–99)
GLUCOSE BLDC GLUCOMTR-MCNC: 80 MG/DL (ref 70–99)
GLUCOSE SERPL-MCNC: 127 MG/DL (ref 70–99)
GLUCOSE UR STRIP-MCNC: 200 MG/DL
HCO3 SERPL-SCNC: 23 MMOL/L (ref 22–29)
HGB UR QL STRIP: NEGATIVE
HOLD SPECIMEN: NORMAL
KETONES UR STRIP-MCNC: NEGATIVE MG/DL
LEUKOCYTE ESTERASE UR QL STRIP: NEGATIVE
MAGNESIUM SERPL-MCNC: 2.3 MG/DL (ref 1.7–2.3)
MAGNESIUM SERPL-MCNC: 2.5 MG/DL (ref 1.7–2.3)
MUCOUS THREADS #/AREA URNS LPF: PRESENT /LPF
NITRATE UR QL: NEGATIVE
PH UR STRIP: 6.5 [PH] (ref 5–7)
PHOSPHATE SERPL-MCNC: 1.5 MG/DL (ref 2.5–4.5)
PHOSPHATE SERPL-MCNC: 2.4 MG/DL (ref 2.5–4.5)
PHOSPHATE SERPL-MCNC: 3.3 MG/DL (ref 2.5–4.5)
POTASSIUM SERPL-SCNC: 4 MMOL/L (ref 3.4–5.3)
PROT SERPL-MCNC: 6.8 G/DL (ref 6.4–8.3)
RBC URINE: 2 /HPF
SODIUM SERPL-SCNC: 141 MMOL/L (ref 135–145)
SP GR UR STRIP: 1.02 (ref 1–1.03)
SQUAMOUS EPITHELIAL: <1 /HPF
UROBILINOGEN UR STRIP-MCNC: 4 MG/DL
WBC URINE: 1 /HPF

## 2024-11-12 PROCEDURE — 83735 ASSAY OF MAGNESIUM: CPT | Performed by: TRANSPLANT SURGERY

## 2024-11-12 PROCEDURE — 250N000011 HC RX IP 250 OP 636: Performed by: NURSE PRACTITIONER

## 2024-11-12 PROCEDURE — 82247 BILIRUBIN TOTAL: CPT | Performed by: NURSE PRACTITIONER

## 2024-11-12 PROCEDURE — 250N000009 HC RX 250: Performed by: NURSE PRACTITIONER

## 2024-11-12 PROCEDURE — 258N000003 HC RX IP 258 OP 636: Performed by: NURSE PRACTITIONER

## 2024-11-12 PROCEDURE — 250N000012 HC RX MED GY IP 250 OP 636 PS 637: Performed by: NURSE PRACTITIONER

## 2024-11-12 PROCEDURE — 81003 URINALYSIS AUTO W/O SCOPE: CPT | Performed by: PHYSICIAN ASSISTANT

## 2024-11-12 PROCEDURE — 258N000003 HC RX IP 258 OP 636

## 2024-11-12 PROCEDURE — 255N000002 HC RX 255 OP 636: Performed by: STUDENT IN AN ORGANIZED HEALTH CARE EDUCATION/TRAINING PROGRAM

## 2024-11-12 PROCEDURE — 250N000009 HC RX 250: Performed by: STUDENT IN AN ORGANIZED HEALTH CARE EDUCATION/TRAINING PROGRAM

## 2024-11-12 PROCEDURE — 250N000013 HC RX MED GY IP 250 OP 250 PS 637: Performed by: NURSE PRACTITIONER

## 2024-11-12 PROCEDURE — 84100 ASSAY OF PHOSPHORUS: CPT | Performed by: NURSE PRACTITIONER

## 2024-11-12 PROCEDURE — 36415 COLL VENOUS BLD VENIPUNCTURE: CPT | Performed by: NURSE PRACTITIONER

## 2024-11-12 PROCEDURE — 82040 ASSAY OF SERUM ALBUMIN: CPT | Performed by: NURSE PRACTITIONER

## 2024-11-12 PROCEDURE — 82947 ASSAY GLUCOSE BLOOD QUANT: CPT | Performed by: NURSE PRACTITIONER

## 2024-11-12 PROCEDURE — 99232 SBSQ HOSP IP/OBS MODERATE 35: CPT | Performed by: STUDENT IN AN ORGANIZED HEALTH CARE EDUCATION/TRAINING PROGRAM

## 2024-11-12 PROCEDURE — 74018 RADEX ABDOMEN 1 VIEW: CPT

## 2024-11-12 PROCEDURE — 74018 RADEX ABDOMEN 1 VIEW: CPT | Mod: 26 | Performed by: RADIOLOGY

## 2024-11-12 PROCEDURE — 99231 SBSQ HOSP IP/OBS SF/LOW 25: CPT | Mod: GC | Performed by: SURGERY

## 2024-11-12 PROCEDURE — 82010 KETONE BODYS QUAN: CPT | Performed by: NURSE PRACTITIONER

## 2024-11-12 PROCEDURE — 999N000157 HC STATISTIC RCP TIME EA 10 MIN

## 2024-11-12 PROCEDURE — 82962 GLUCOSE BLOOD TEST: CPT

## 2024-11-12 PROCEDURE — 120N000005 HC R&B MS OVERFLOW UMMC

## 2024-11-12 PROCEDURE — 80053 COMPREHEN METABOLIC PANEL: CPT | Performed by: NURSE PRACTITIONER

## 2024-11-12 PROCEDURE — 84100 ASSAY OF PHOSPHORUS: CPT | Performed by: TRANSPLANT SURGERY

## 2024-11-12 RX ORDER — SODIUM CHLORIDE, SODIUM LACTATE, POTASSIUM CHLORIDE, CALCIUM CHLORIDE 600; 310; 30; 20 MG/100ML; MG/100ML; MG/100ML; MG/100ML
INJECTION, SOLUTION INTRAVENOUS CONTINUOUS
Status: ACTIVE | OUTPATIENT
Start: 2024-11-12 | End: 2024-11-13

## 2024-11-12 RX ORDER — POTASSIUM PHOS IN 0.9 % NACL 15MMOL/250
15 PLASTIC BAG, INJECTION (ML) INTRAVENOUS ONCE
Status: COMPLETED | OUTPATIENT
Start: 2024-11-12 | End: 2024-11-12

## 2024-11-12 RX ORDER — KETOROLAC TROMETHAMINE 30 MG/ML
30 INJECTION, SOLUTION INTRAMUSCULAR; INTRAVENOUS EVERY 6 HOURS PRN
Status: DISCONTINUED | OUTPATIENT
Start: 2024-11-12 | End: 2024-11-14 | Stop reason: HOSPADM

## 2024-11-12 RX ORDER — ALBUTEROL SULFATE 90 UG/1
2 INHALANT RESPIRATORY (INHALATION) EVERY 6 HOURS PRN
Status: DISCONTINUED | OUTPATIENT
Start: 2024-11-12 | End: 2024-11-14 | Stop reason: HOSPADM

## 2024-11-12 RX ADMIN — WATER 150 ML: 100 IRRIGANT IRRIGATION at 11:19

## 2024-11-12 RX ADMIN — PANTOPRAZOLE SODIUM 40 MG: 40 INJECTION, POWDER, FOR SOLUTION INTRAVENOUS at 20:06

## 2024-11-12 RX ADMIN — HYDROMORPHONE HYDROCHLORIDE 1 MG: 1 INJECTION, SOLUTION INTRAMUSCULAR; INTRAVENOUS; SUBCUTANEOUS at 16:11

## 2024-11-12 RX ADMIN — HYDROMORPHONE HYDROCHLORIDE 1 MG: 1 INJECTION, SOLUTION INTRAMUSCULAR; INTRAVENOUS; SUBCUTANEOUS at 08:36

## 2024-11-12 RX ADMIN — KETOROLAC TROMETHAMINE 30 MG: 30 INJECTION, SOLUTION INTRAMUSCULAR at 14:28

## 2024-11-12 RX ADMIN — HYDROMORPHONE HYDROCHLORIDE 1 MG: 1 INJECTION, SOLUTION INTRAMUSCULAR; INTRAVENOUS; SUBCUTANEOUS at 20:19

## 2024-11-12 RX ADMIN — INSULIN ASPART 3 UNITS: 100 INJECTION, SOLUTION INTRAVENOUS; SUBCUTANEOUS at 11:29

## 2024-11-12 RX ADMIN — HYDROMORPHONE HYDROCHLORIDE 1 MG: 1 INJECTION, SOLUTION INTRAMUSCULAR; INTRAVENOUS; SUBCUTANEOUS at 12:01

## 2024-11-12 RX ADMIN — PANTOPRAZOLE SODIUM 40 MG: 40 INJECTION, POWDER, FOR SOLUTION INTRAVENOUS at 08:36

## 2024-11-12 RX ADMIN — INSULIN GLARGINE 20 UNITS: 100 INJECTION, SOLUTION SUBCUTANEOUS at 22:20

## 2024-11-12 RX ADMIN — SODIUM CHLORIDE, POTASSIUM CHLORIDE, SODIUM LACTATE AND CALCIUM CHLORIDE: 600; 310; 30; 20 INJECTION, SOLUTION INTRAVENOUS at 18:11

## 2024-11-12 RX ADMIN — SODIUM CHLORIDE, POTASSIUM CHLORIDE, SODIUM LACTATE AND CALCIUM CHLORIDE 1000 ML: 600; 310; 30; 20 INJECTION, SOLUTION INTRAVENOUS at 00:28

## 2024-11-12 RX ADMIN — POTASSIUM PHOSPHATE, MONOBASIC POTASSIUM PHOSPHATE, DIBASIC 15 MMOL: 224; 236 INJECTION, SOLUTION, CONCENTRATE INTRAVENOUS at 04:25

## 2024-11-12 NOTE — PROGRESS NOTES
"Cross Cover Note    /76 (BP Location: Left arm, Patient Position: Supine, Cuff Size: Adult Regular)   Pulse 83   Temp 97.6  F (36.4  C) (Oral)   Resp 16   Ht 1.626 m (5' 4\")   Wt 72.6 kg (160 lb)   SpO2 100%   BMI 27.46 kg/m      Notified by nursing that patient has only voided once today. Appears patient received 1 L LR bolus last night but then has been NPO and has not received any fluids.     - Added  ml/hr with stop time around 0900, please reassess in AM  - Bladder scan  - Reached out to surgery to see if any changes can be made following gastrografin study, have not heard back yet   - Addendum- heard back from surgery passed gastrografin challenge so start CLD, they ordered this  - Monitor Is/Os      Doris Stewart PA-C    "

## 2024-11-12 NOTE — MEDICATION SCRIBE - ADMISSION MEDICATION HISTORY
Medication Scribe Admission Medication History    Admission medication history is complete. The information provided in this note is only as accurate as the sources available at the time of the update.    Information Source(s): Patient via in-person    Pertinent Information: Pt reported he has not taken medications on PTA medication list for the past three weeks or more, stated he has not been able to  any of his medications because he do not have the means to pick them up.     Changes made to PTA medication list:  Added: None  Deleted: None  Changed: None    Allergies reviewed with patient and updates made in EHR: yes    Medication History Completed By: Christel Otero 11/12/2024 7:01 AM    No outpatient medications have been marked as taking for the 11/11/24 encounter (Hospital Encounter).

## 2024-11-12 NOTE — H&P
[facility]    History and Physical - Hospitalist Service, GOLD TEAM        Date of Admission:  11/11/2024    Assessment & Plan      Andrew Collier is a 58 year old male admitted on 11/11/2024. He has medical history of multiple small bowel obstructions, methamphetamine use, diabetes type 2, HIV.  He presented to ED with chief complaint of abdominal pain, nausea, vomiting and was found to have small bowel obstruction.    # Small bowel obstruction  Presented with nausea, vomiting, abdominal pain.  Onset was about 36 hours prior to presentation and worsening on morning of admission.  CT A/P with SBO with 2 transition points and meeting criteria for closed-loop obstruction.    -Surgery consulted, appreciate assistance    -NG tube to low intermittent suction    -Patient to remain n.p.o. at this time    -Multimodal pain management    -Electrolyte replacement    -IV fluid    # Elevated serum ketones  Ketones checked in ED and were 1.63 mmol/L, initially concerning for possible DKA.  However patient was not acidotic with pH of 7.51.  Suspect an element of starvation ketosis.    -Recheck quantitative ketones was delayed for unclear reason.  Reordered.    #Diabetes mellitus type 2 with long-term use of insulin    -Home regimen: Lantus, insulin aspart.  Difficult with adherence due to lack of stable housing and stolen medications  -In-hospital regimen:  -Basal Insulin: 20 units Lantus while n.p.o.  -Insulin correction sliding scale: High resistance sliding scale  -Carb coverage: None currently  Lab Results   Component Value Date    A1C 10.5 10/23/2024    A1C 9.7 11/11/2020     # coronavirus (NON-covid-19)    -No treatment.  May discontinue droplet precautions on 11/12    # Chronic conditions  # HIV: Continue PTA Biktarvy in a.m. (may clamp NGT for 30 minutes after administration)  # History of asthma: Albuterol inhaler as needed          Diet: NPO for Medical/Clinical  "Reasons Except for: Meds    DVT Prophylaxis: Pneumatic Compression Devices  Potts Catheter: Not present  Lines: None     Cardiac Monitoring: None  Code Status: Full Code      Clinically Significant Risk Factors Present on Admission                            # DMII: A1C = 10.5 % (Ref range: <5.7 %) within past 6 months   # Overweight: Estimated body mass index is 27.46 kg/m  as calculated from the following:    Height as of this encounter: 1.626 m (5' 4\").    Weight as of this encounter: 72.6 kg (160 lb).       # Financial/Environmental Concerns:    # Support System: poor social support noted in nursing assessment  # Housing Instability: noted in nursing assessment         Disposition Plan     Medically Ready for Discharge: Anticipated in 2-4 Days         The patient's care was discussed with the Attending Physician, Dr. Garza and Patient.    VIKASH Shetty [facility]  Hospitalist Service, [facility]  Securely message with [employer] (more info)  Text page via [facility] Paging/Directory   See signed in provider for up to date coverage information    ______________________________________________________________________    Chief Complaint   Abdominal pain and vomiting    History is obtained from the patient and electronic health record    History of Present Illness   Andrew Collier is a 58 year old male who has medical history of HIV AIDS, methamphetamine use, diabetes type 2, recurrent SBOs who presented to ED with chief complaint of abdominal pain and vomiting and was found to have small bowel obstruction.  Patient notes multiple similar occurrences of small bowel obstructions for which he has needed hospitalization.  He he has history of abdominal surgery x 3 and has adhesions.  Patient was seen by the surgery team prior to my assessment and had NG tube already in place.  Patient stated NG tube helped his pain somewhat though was still very painful.  " Less nausea since placement of NG tube.    We discussed that his blood glucose was also elevated when he came in.  He states that it is very difficult to take care of his diabetes when he does not have a home.  He states that he is sometimes able to get his medications and does not have a safe place to store them and then insulin is frequently stolen.  He realizes the importance of taking care of his diabetes but is often unable to have reliable access to medications due to living situation.  We discussed ordering subcutaneous insulin instead of insulin drip given lack of acidosis on VBG and patient preference to avoid hourly blood sugar checks overnight.  Neris was in agreement with admission and plan of care that we discussed together.    Past Medical History    Past Medical History:   Diagnosis Date    Acute appendicitis with localized peritonitis 1/31/2018    AIDS (H)     Allergic rhinitis due to other allergen     DNS    Anal dysplasia     Chronic abdominal pain     CNS toxoplasmosis (H)     COVID-19 1/11/2022    Diabetes type 2, controlled (H)     GERD (gastroesophageal reflux disease)     Herpes zoster 9/23/2016    History of seizure     History of substance abuse (H)     HIV (human immunodeficiency virus infection) (H)     HLD (hyperlipidemia)     Lung nodules     Periungual wart     Pneumonia 1/6/2019    PTSD (post-traumatic stress disorder)     Sleep apnea     doesn't use CPAP       Past Surgical History   Past Surgical History:   Procedure Laterality Date    ANOSCOPY N/A 9/9/2020    Procedure: Exam Under Anesthesia, ANOSCOPY, fulgeration of rectal fissures with Rectal Biopsies;  Surgeon: Thanh Lundberg MD;  Location: [facility] OR    COLONOSCOPY Left 1/22/2016    Procedure: COMBINED COLONOSCOPY, SINGLE OR MULTIPLE BIOPSY/POLYPECTOMY BY BIOPSY;  Surgeon: Clark Saini MD;  Location: [facility] GI    ESOPHAGOSCOPY, GASTROSCOPY, DUODENOSCOPY (EGD), COMBINED N/A 6/6/2022    Procedure: ESOPHAGOGASTRODUODENOSCOPY,  WITH BIOPSY;  Surgeon: Kecia Benítez MD;  Location: [facility] GI    ESOPHAGOSCOPY, GASTROSCOPY, DUODENOSCOPY (EGD), COMBINED N/A 10/10/2024    Procedure: Esophagoscopy, gastroscopy, duodenoscopy (EGD), combined;  Surgeon: Nico Bhatti DO;  Location: [facility] GI    HC EXPLORE UNDESC TESTIS,INGUIN/SCROTAL      LAPAROSCOPIC APPENDECTOMY N/A 2018    Procedure: LAPAROSCOPIC APPENDECTOMY;  LAPAROSCOPIC APPENDECTOMY;  Surgeon: Dawn Holt MD;  Location: [facility] OR    LAPAROSCOPY DIAGNOSTIC (GENERAL) N/A 2016    Procedure: LAPAROSCOPY DIAGNOSTIC (GENERAL);  Surgeon: Susannah Arriaga MD;  Location: [facility]    LAPAROSCOPY DIAGNOSTIC (GENERAL) N/A 2018    Procedure: LAPAROSCOPY DIAGNOSTIC (GENERAL);  Diagnostic laparoscopy and lysis of adhesions;  Surgeon: Prince Dowling MD;  Location: [facility] OR    OPTICAL TRACKING SYSTEM CRANIOTOMY, EXCISE TUMOR, COMBINED Left 4/10/2015    Procedure: COMBINED OPTICAL TRACKING SYSTEM CRANIOTOMY, EXCISE TUMOR;  Surgeon: Mirlande Colmenares MD;  Location: [facility] OR    REPAIR GAMEKEEPER'S THUMB Right 2016    Procedure: REPAIR LIGAMENT ULNAR COLLATERAL THUMB (GAMEKEEPER'S);  Surgeon: Evin Zamorano MD;  Location:  OR    [facility] NONSPECIFIC PROCEDURE      right forearm fracture       Prior to Admission Medications   Prior to Admission Medications   Prescriptions Last Dose Informant Patient Reported? Taking?   Continuous Glucose Sensor (DEXCOM G6 SENSOR) MISC   No No   Si each every 10 days Change every 10 days.   Continuous Glucose Sensor (DEXCOM G7 SENSOR) MISC   No No   Si Device every 10 days.   Continuous Glucose Transmitter (DEXCOM G6 TRANSMITTER) MISC   No No   Si each every 3 months Change every 3 months.   Nutritional Supplements (ENSURE ACTIVE) LIQD   No No   Sig: Take 414 mLs by mouth daily   albuterol (PROAIR HFA/PROVENTIL HFA/VENTOLIN HFA) 108 (90 Base) MCG/ACT inhaler   No No   Sig: Inhale 2 puffs into the lungs every 6 hours as needed for  shortness of breath, wheezing or cough.   benzonatate (TESSALON) 100 MG capsule   No No   Sig: Take 1 capsule (100 mg) by mouth 3 times daily as needed for cough.   bictegravir-emtricitabine-tenofovir (BIKTARVY) -25 MG per tablet   No No   Sig: Take 1 tablet by mouth daily   blood glucose (NO BRAND SPECIFIED) lancets standard   No No   Sig: Use to test blood sugar 1 time daily or as directed.   blood glucose (NO BRAND SPECIFIED) test strip   No No   Sig: Use to test blood sugar 1 time daily or as directed.   blood glucose monitoring (NO BRAND SPECIFIED) meter device kit   No No   Sig: Use to test blood sugar 3 times daily or as directed.   insulin aspart (NOVOLOG PEN) 100 UNIT/ML pen   No No   Sig: Inject 10 Units subcutaneously 3 times daily (with meals). Take only when you have access to a meal.   insulin glargine (LANTUS PEN) 100 UNIT/ML pen   No No   Sig: Inject 28 Units subcutaneously every morning. REDUCE TO 15 UNITS IF YOU THINK YOU WILL NOT HAVE FOOD THAT DAY.   insulin pen needle (32G X 4 MM) 32G X 4 MM miscellaneous   No No   Sig: Use 4-5 pen needles daily or as directed.   naproxen (NAPROSYN) 500 MG tablet   No No   Sig: Take 1 tablet (500 mg) by mouth 2 times daily as needed for moderate pain. Take with meals.   pantoprazole (PROTONIX) 40 MG EC tablet   No No   Sig: Take 1 tablet (40 mg) by mouth 2 times daily (before meals).   polymixin b-trimethoprim (POLYTRIM) 55496-4.1 UNIT/ML-% ophthalmic solution   No No   Sig: Place 2 drops Into the left eye every 2 hours (while awake).   sennosides (SENOKOT) 8.6 MG tablet   No No   Sig: Take 1 tablet by mouth daily as needed for constipation.      Facility-Administered Medications: None           Physical Exam   Vital Signs: Temp: 97.8  F (36.6  C) Temp src: Oral BP: 129/84 Pulse: 107   Resp: 20 SpO2: 99 % O2 Device: None (Room air)    Weight: 160 lbs 0 oz    Physical Exam  Vitals reviewed.   Constitutional:       General: He is not in acute  distress.  HENT:      Nose:      Comments: NG tube in place to low intermittent suction  Eyes:      General: No scleral icterus.  Cardiovascular:      Rate and Rhythm: Normal rate and regular rhythm.   Pulmonary:      Effort: Pulmonary effort is normal. No respiratory distress.      Breath sounds: Normal breath sounds.   Abdominal:      General: Bowel sounds are increased. There is distension.   Musculoskeletal:         General: Normal range of motion.      Right lower leg: No edema.      Left lower leg: No edema.   Skin:     General: Skin is warm and dry.   Neurological:      Mental Status: Mental status is at baseline.       Medical Decision Making       75 MINUTES SPENT BY ME on the date of service doing chart review, history, exam, documentation & further activities per the note.      Data     I have personally reviewed the following data over the past 24 hrs:    10.2  \   14.4   / 477 (H)     136 98 20.6 /  280 (H)   4.1 20 (L) 0.74 \     ALT: 14 AST: 17 AP: 178 (H) TBILI: 0.8   ALB: 4.6 TOT PROTEIN: 8.8 (H) LIPASE: 40     Procal: N/A CRP: N/A Lactic Acid: 1.8

## 2024-11-12 NOTE — PROGRESS NOTES
Surgery Progress Note  11/12/2024       Subjective:  - Patient removed NGT this morning, approximately 900 ml of output prior to removal. Patient continues to endorse some abdominal pain this morning      Objective:  Temp:  [98.2  F (36.8  C)] 98.2  F (36.8  C)  Pulse:  [83] 83  Resp:  [16] 16  BP: (150)/(89) 150/89  SpO2:  [100 %] 100 %    I/O last 3 completed shifts:  In: 1200 [IV Piggyback:1200]  Out: 900 [Emesis/NG output:900]      Gen: Awake, alert, NAD  Resp: NLB on RA  Abd: soft, nondistended, mild generalized tenderness   Ext: WWP, no edema     Labs:  Recent Labs   Lab 11/11/24  1548 11/05/24  1817   WBC 10.2 10.5   HGB 14.4 12.3*   * 358       Recent Labs   Lab 11/12/24  1122 11/12/24  1031 11/12/24  0845 11/12/24  0430 11/12/24  0233 11/11/24 2201 11/11/24  1548 11/05/24 2008 11/05/24  1817   NA  --   --   --   --  141  --  136  --  128*   POTASSIUM  --   --   --   --  4.0  --  4.1  --  4.2   CHLORIDE  --   --   --   --  105  --  98  --  92*   CO2  --   --   --   --  23  --  20*  --  26   BUN  --   --   --   --  15.7  --  20.6  --  21.8   CR  --   --   --   --  0.71  --  0.74  --  0.76   *  --  158* 80 127*   < > 438*   < > 464*   LINDA  --   --   --   --  9.1  --  10.1  --  9.7   MAG  --   --   --   --  2.3  --  2.5*  --   --    PHOS  --  3.3  --   --  2.4*  --  1.5*  --   --     < > = values in this interval not displayed.       Imaging:  XR Abdomen Port 1 View    Result Date: 11/11/2024  EXAM: XR ABDOMEN PORT 1 VIEW LOCATION: [facility] DATE: 11/11/2024 INDICATION: NGT placement COMPARISON: CT abdomen/pelvis 11/11/2024.     IMPRESSION: Nasogastric tube with tip and sidehole overlying the stomach. Excreted contrast noted in the renal system bilaterally. Known mechanical obstruction better seen on same day CT. Visualized lung bases are clear. No acute bony abnormality.    CT Abdomen Pelvis w Contrast    Result Date: 11/11/2024  EXAMINATION: CT  ABDOMEN PELVIS W CONTRAST, 11/11/2024 5:45 PM INDICATION: Left-sided abdominal pain vomiting history of multiple bowel obstructions COMPARISON STUDY: CT 10/22/2024 TECHNIQUE: CT scan of the abdomen and pelvis was performed on multidetector CT scanner using volumetric acquisition technique and images were reconstructed in multiple planes with variable thickness and reviewed on dedicated workstations. CONTRAST: iopamidol (ISOVUE-370) solution 99 mL IV without oral contrast CT scan radiation dose is optimized to minimum requisite dose using automated dose modulation techniques. FINDINGS: Lower thorax: Bibasilar dependent subsegmental atelectasis. Liver: No mass. No intrahepatic biliary ductal dilation. Biliary System: Cholelithiasis. No extrahepatic biliary dilation. Pancreas: No mass or pancreatic ductal dilation. Adrenal glands: No mass or nodules Spleen: Normal. Kidneys: No suspicious mass, obstructing calculus or hydronephrosis. Gastrointestinal tract: Multiple dilated, fluid-filled loops of small bowel within the left upper quadrant, measuring up to 5 cm. Transition point likely within the left upper quadrant series 4 images 188 through 208. There is also a relative transition point in the right abdomen, series 4 images 140 through 156. Bowel loops upstream and downstream from these regions are difficult to follow. The duodenum and upstream jejunum are not distended. Areas of questionable hyperemia and thickening in bowel loops in the mid to lower left abdomen for example series 4 image 230. No signs of enteritis noted on recent MRI enterography 10/26/2024 suggesting some of this may be related to peristalsis. No wall thickening of dilated loop however there is some mild adjacent fat stranding, series 4 image 142. No evidence of perforation. Large bowel is unremarkable. Diverticulosis. History of appendectomy with likely small residual appendix (series 4, image 257). Pelvis: Urinary bladder is normal. Penile  implant with collapsed reservoir, stable in appearance. Mesentery/peritoneum/retroperitoneum: No mass. No free air. Lymph nodes: No significant lymphadenopathy. Vasculature: Scattered atherosclerotic calcification of abdominal aorta with no aneurysmal dilation. Soft tissues: Within normal limits. Osseous structures: No aggressive or acute osseous lesion.     IMPRESSION: 1.  Small bowel obstruction centered about the left upper quadrant with segmental dilatation of loops of small bowel measuring up to 5 cm. No evidence of perforation. There are 2 transition points suspected as discussed above with intervening dilated segment of small bowel with upstream and downstream collapse. Correlation with patient's symptoms and presentation for developing closed-loop obstruction given findings. This may be secondary to adhesions given no abnormality noted on recent MRI enterography. Similar appearance is also noted on several prior CT studies. 2.  Other chronic findings as above. Findings discussed with the emergency room physician by Dr. Jose Ramon Alvarez at 7:35 PM 11/11/2024. Provider will relay findings to general surgery consult team. I have personally reviewed the examination and initial interpretation and I agree with the findings. APRIL ELIZONDO MD   SYSTEM ID:  [id number]    XR Chest 2 Views    Result Date: 11/5/2024  EXAM: XR CHEST 2 VIEWS LOCATION: [facility] DATE: 11/5/2024 INDICATION: HIV, persistent cough COMPARISON: None.     IMPRESSION: Negative chest.    XR Knee Left 3 Views    Result Date: 10/30/2024  EXAM: LEFT KNEE 3 VIEWS LOCATION: [facility] DATE/TIME: 10/29/2024 11:38 PM CDT INDICATION: Fall. Pain. COMPARISON: None.     IMPRESSION: 1. A minimal focal cortical contour irregularity of the proximal metaphysis of the left fibula. This most likely relates to an old healed fracture. A nondisplaced acute fracture cannot  "be entirely excluded. 2. No other findings suspicious for acute fracture or malalignment of the left knee. 3. No left knee joint effusion. 4. Apparent mild prepatellar soft tissue swelling. 5. Minimal tricompartmental degenerative changes in the knee.    MR Enterography wo and w Contrast    Addendum Date: 10/26/2024    International evidence-based [place] guidelines for the management of intraductal papillary mucinous neoplasm of the pancreas: Surveillance is recommended if no high risk stigmata or worrisome features are present: Primary imaging modalities recommended are MRI/MRCP or MDCT Size of largest cyst: *  Less than 20 mm: Follow-up imaging in 6 months once, then every 18 months if no change. Stop surveillance if stable for 5 years. *  More than 20 mm but less than 30 mm: Follow-up imaging at 6 months, 12 months, then yearly if no change. *  More than 30 mm- follow up imaging every 6 months. GI consultation for surgery/endoscopic ultrasound is recommended for cysts with \"high-risk stigmata\" of high grade dysplasia or invasive carcinoma such as: 1. Obstructive jaundice in a patient with cystic lesion of the head of the pancreas 2. Enhancing mural nodule > 5mm or solid component 3. Main pancreatic duct >10mm 4. Suspicious or positive results of cytology If worrisome features are present, GI consultation is recommended since management is based on multiple factors. The patient may go for surgery/surveillance: Worrisome features on imagin. Cyst more than or equal to 30mm 2. Thickened or enhancing cyst walls 3. Main pancreatic duct > 5mm and < 10mm. 4. Abrupt change in caliber of pancreatic duct with distal atrophy 5. Lymphadenopathy 6. Cyst growth rate > 2.5mm/year *Reference: International evidence-based [place] guidelines for the management of intraductal papillary mucinous neoplasm of the pancreas Pancreatology: 24:(); 255-270. [email or web address] JANIE VANN MD   SYSTEM ID:  " [id number]    Result Date: 10/26/2024  EXAMINATION: MR ENTEROGRAPHY W/O AND W CONTRAST, 10/26/2024 11:49 AM TECHNIQUE: Multiplanar, multisequence MRI imaging of the abdomen and pelvis was obtained using MR enterography protocol without and with intravenous gadolinium. Contrast dose: 7mL Gadavist COMPARISON: CT abdomen and pelvis 10/22/2024 HISTORY: Recurrent SBO FINDINGS: Exam quality: Inadequate distention of the small bowel limits evaluation Small and large bowel: The small and large bowel are normal in caliber. There is no evidence of obstruction there is no abnormal wall thickening or hyperenhancement. Remainder of exam: The stomach is distended with fluid and ingested food. No wall thickening or mass in the stomach or duodenum. Cholelithiasis. Normal appearance of the liver, biliary tree, spleen, adrenals and kidneys. 2 mm cyst in the pancreatic tail (series 8, image 32). There is mild thickening of the urinary bladder which may be due to prostatomegaly. No ascites or lymphadenopathy. Aorta and its major branches are patent. No suspicious osseous lesions.     IMPRESSION: 1.  No abnormality demonstrated in the bowel; specifically no evidence of bowel obstruction or acute inflammation. 2.  The stomach is distended with fluid and ingested food but there is no obstructing mass or wall thickening. Consider nuclear medicine gastric emptying study to rule out gastroparesis. 3.  Cholelithiasis. 4.  Tiny pancreatic cyst may represent an IPMN. Follow-up guidelines are given below. 5.  Prostatomegaly with mild urinary bladder wall thickening. JANIE VANN MD   SYSTEM ID:  [id number]    XR Abdomen Port 1 View    Result Date: 10/26/2024  EXAMINATION:  XR ABDOMEN PORT 1 VIEW 10/26/2024 COMPARISON: Abdominal radiograph 10/23/2024 HISTORY: worsening LUQ abdominal pain, hx of SBO. TECHNIQUE: One frontal supine view of the abdomen. FINDINGS: No abnormally dilated loops of bowel project over the abdomen. Moderate colonic stool  burden. Nonobstructive bowel gas pattern. Partially visualized lung bases are within normal limits. Penile prosthesis.     IMPRESSION: Moderate colonic stool burden with nonobstructive bowel gas pattern. I have personally reviewed the examination and initial interpretation and I agree with the findings. APRIL ELIZONDO MD   SYSTEM ID:  [id number]    XR Abdomen Port 1 View    Result Date: 10/23/2024  EXAM: ABDOMEN SINGLE VIEW PORTABLE LOCATION: [facility] DATE/TIME: 10/23/2024 2:55 AM CDT INDICATION: Tube placement. COMPARISON: 11/10/2024.     IMPRESSION: An enteric drainage tube is present with the distal tip in the gastric body.     CT Abdomen Pelvis w Contrast    Result Date: 10/23/2024  EXAM: CT ABDOMEN PELVIS W CONTRAST LOCATION: [facility] DATE: 10/23/2024 INDICATION: abd pain COMPARISON: 10/21/2024 TECHNIQUE: CT scan of the abdomen and pelvis was performed following injection of IV contrast. Multiplanar reformats were obtained. Dose reduction techniques were used. CONTRAST: 92ml isovue 370 FINDINGS: LOWER CHEST: Dependent atelectasis in the posterior lung bases, greater on the left. HEPATOBILIARY: Gallbladder is nondistended and contains a calcified gallstone. No significant mass or bile duct dilatation. PANCREAS: No significant mass, duct dilatation, or inflammatory change. SPLEEN: Normal. ADRENAL GLANDS: Normal. KIDNEYS/BLADDER: The bilateral kidneys enhance symmetrically without evidence for hydronephrosis or pyelonephritis. No renal masses or calculi noted. The bilateral ureters and urinary bladder are unremarkable. BOWEL: Moderate to marked interval fluid-filled distention of the stomach. Clinical correlation for gastric outlet obstruction symptoms. Nonspecific bowel gas pattern otherwise. Appendix not seen. LYMPH NODES: No lymphadenopathy. VASCULATURE: No abdominal aortic aneurysm. Mild vascular calcifications  abdominal aorta and common iliac arteries. PELVIC ORGANS: Penile implant with reservoir left lower pelvis anteriorly. No pelvic free fluid. MUSCULOSKELETAL: No concerning osseous abnormalities. No inguinal hernias. Small periumbilical ventral hernia.     IMPRESSION: 1.  Moderate to marked interval fluid-filled distention of the stomach since prior exam. Clinical correlation for gastric outlet obstruction symptoms. 2.  Cholelithiasis without gallbladder distention. No bile duct dilatation. 3.  Dependent atelectasis in the posterior lung bases, greater on the left. 4.  Penile implant with reservoir left lower pelvis anteriorly. 5.  Small periumbilical ventral hernia.     CT Abdomen Pelvis w Contrast    Result Date: 10/21/2024  EXAMINATION: CT ABDOMEN PELVIS W CONTRAST, 10/21/2024 2:10 PM INDICATION: abdominal pain, h/o SBO COMPARISON STUDY: 10/10/2024 TECHNIQUE: CT scan of the abdomen and pelvis was performed on multidetector CT scanner using volumetric acquisition technique and images were reconstructed in multiple planes with variable thickness and reviewed on dedicated workstations. CONTRAST: 101cc of isovue 370 injected IV without oral contrast CT scan radiation dose is optimized to minimum requisite dose using automated dose modulation techniques. FINDINGS: Lower thorax: Esophageal thickening, similar to prior. Otherwise unremarkable lower chest. Liver: No mass. No intrahepatic biliary ductal dilation. Biliary System: Cholelithiasis without acute cholecystitis. No extrahepatic biliary ductal dilation. Pancreas: No mass or pancreatic ductal dilation. Adrenal glands: No mass or nodules Spleen: Normal. Kidneys: No suspicious mass, obstructing calculus or hydronephrosis. Gastrointestinal tract :Post-operative changes of appendectomy. Normal caliber small bowel with decreased distention of the previously dilated loops of small bowel in the left mid abdomen/left lower quadrant. Mesentery/peritoneum/retroperitoneum: No  mass. No free fluid or air. Lymph nodes: No significant lymphadenopathy. Vasculature: Patent major abdominal vasculature. Pelvis: Urinary bladder is normal.  Prostate is enlarged. Penile prosthesis with intrapelvic reservoir. Osseous structures: No aggressive or acute osseous lesion.   Soft tissues: Tiny soft tissue density focus in the subcutaneous fat in the right lower quadrant abdominal wall, similar to prior and potentially a injection granuloma.     IMPRESSION: 1. No acute intra-abdominal findings to explain the patient's abdominal pain. Decreased distention of previously dilated loops of small bowel in the left mid abdomen/left lower quadrant since 10/10/2024. 2. Incidental findings not substantially changed from 10/10/2024. I have personally reviewed the examination and initial interpretation and I agree with the findings. JOSE VITAL DO   SYSTEM ID:  [id number]    US Abdomen Limited (RUQ)    Result Date: 10/21/2024  EXAMINATION: Limited Abdominal Ultrasound, 10/21/2024 11:19 AM COMPARISON: CT AP 10/10/2024. Ultrasound abdomen Limited 10/15/2024. HISTORY: epigastric pain, h/o gallstones FINDINGS: Fluid: No evidence of ascites or pleural effusions. Liver: The liver demonstrates normal echotexture, measuring 14.9 cm in craniocaudal dimension. There is no focal mass. Left hepatic lobe is not well visualized. Gallbladder: Shadowing gallstone in the gallbladder neck only visualized in left lateral decubitus position. There is no wall thickening or pericholecystic fluid. Negative sonographic Mendes's sign. Bile Ducts: Both the intra- and extrahepatic biliary system are of normal caliber.  The common bile duct measures 3 mm in diameter. Pancreas: Obscured. Kidney: The right kidney measures 9.9 cm long. There is no hydronephrosis or hydroureter, no shadowing renal calculi, cystic lesion or mass.     IMPRESSION: Small calcified gallstone in the gallbladder neck. No sonographic evidence of acute cholecystitis. I  have personally reviewed the examination and initial interpretation and I agree with the findings. APRIL ELIZONDO MD   SYSTEM ID:  [id number]    US Abdomen Limited    Result Date: 10/15/2024  US ABDOMEN LIMITED 10/15/2024 9:38 AM CLINICAL HISTORY: abd pain; elevated total bilirubin; recent gallstone TECHNIQUE: Limited abdominal ultrasound. COMPARISON: CT of the abdomen and pelvis on 10/10/2024 FINDINGS: GALLBLADDER: The small calcified stone in the gallbladder neck seen on CT is not visible by ultrasound. No gallbladder wall thickening, or pericholecystic fluid. Negative sonographic Mendes's sign. BILE DUCTS: There is no biliary dilatation. The common duct measures 3mm. LIVER: Normal echogenicity and echotexture. No focal masses. RIGHT KIDNEY: No hydronephrosis. PANCREAS: The pancreas is largely obscured by overlying gas.     IMPRESSION: 1.  The small calcified stone in the gallbladder neck seen on the recent CT was not visible by ultrasound. No evidence for acute cholecystitis. No biliary ductal dilatation. ISAÍAS METCALF MD   SYSTEM ID:  [id number]        Assessment/Plan:   janes Collier is a 58 year old male with history significant for recurrent SBO, poorly controlled T2DM, HIV on ART who presents with abdominal pain, nausea, vomiting. CT demonstrates two transition points concerning for closed-loop obstruction. Patient had NGT placed overnight with about 900 ml of output but was removed by patient this morning. Patient with reassuring exam and abdomen is soft and non-distended.     - continue NPO at this time  - GGC challenge today (ordered)  - EGS will continue to follow      Seen, examined, and discussed with chief resident, who will discuss with staff.  - - - - - - - - - - - - - - - - - -  Momo Barcenas, MD  General Surgery PGY-2   Pager: [phone]

## 2024-11-12 NOTE — PROGRESS NOTES
[facility]    Medicine Progress Note - Hospitalist Service, GOLD TEAM 7    Date of Admission:  11/11/2024    Assessment & Plan      Andrew Collier is a 58 year old male admitted on 11/11/2024. He has medical history of multiple small bowel obstructions, methamphetamine use, diabetes type 2, HIV.  He presented to ED with chief complaint of abdominal pain, nausea, vomiting and was found to have small bowel obstruction.    # Small bowel obstruction  Presented with nausea, vomiting, abdominal pain.  Onset was about 36 hours prior to presentation and worsening on morning of admission.  CT A/P with SBO with 2 transition points and meeting criteria for closed-loop obstruction.    -Surgery consulted, appreciate assistance   - Planned gastrograffin challenge today after decompression from LIS    -NG came out and abdomen is feeling improved.     -Patient to remain n.p.o. at this time    -Multimodal pain management    -Electrolyte replacement    -IV fluid    # Elevated serum ketones  Ketones checked in ED and were 1.63 mmol/L, initially concerning for possible DKA.  However patient was not acidotic with pH of 7.51.  Suspect an element of starvation ketosis. Repeat check of ketones show this has resolved.     #Diabetes mellitus type 2 with long-term use of insulin    -Home regimen: Lantus, insulin aspart.  Difficult with adherence due to lack of stable housing and stolen medications  -In-hospital regimen:  -Basal Insulin: 20 units Lantus while n.p.o.  -Insulin correction sliding scale: High resistance sliding scale  -Carb coverage: None currently  Lab Results   Component Value Date    A1C 10.5 10/23/2024    A1C 9.7 11/11/2020     # coronavirus (NON-covid-19)  Tested positive on 11/5 for coronavirus (non-covid 19) when he presented to the ER for cough    -No treatment.  May discontinue droplet precautions on 11/12    # Chronic conditions  # HIV: Continue PTA Biktarvy in a.m.  "(may clamp NGT for 30 minutes after administration)  # History of asthma: Albuterol inhaler as needed          Diet: NPO for Medical/Clinical Reasons Except for: Meds    DVT Prophylaxis: Pneumatic Compression Devices and Ambulate every shift  Potts Catheter: Not present  Lines: None     Cardiac Monitoring: None  Code Status: Full Code      Clinically Significant Risk Factors Present on Admission                            # DMII: A1C = 10.5 % (Ref range: <5.7 %) within past 6 months   # Overweight: Estimated body mass index is 27.46 kg/m  as calculated from the following:    Height as of this encounter: 1.626 m (5' 4\").    Weight as of this encounter: 72.6 kg (160 lb).       # Financial/Environmental Concerns:    # Support System: poor social support noted in nursing assessment  # Housing Instability: noted in nursing assessment         Disposition Plan     Medically Ready for Discharge: Anticipated in 2-4 Days    Limits to discharge include:   - diabetes management  - SBO           Camila Rocha,   Hospitalist Service, GOLD TEAM 7  [facility]  Securely message with [employer] (more info)  Text page via [employer] Paging/Directory   See signed in provider for up to date coverage information  ______________________________________________________________________    Interval History   He accidentally pulled his NGT out this morning. Abdomen is feeling soft and decompressed.     Physical Exam   Vital Signs: Temp: 97.8  F (36.6  C) Temp src: Oral BP: 129/84 Pulse: 107   Resp: 20 SpO2: 99 % O2 Device: None (Room air)    Weight: 160 lbs 0 oz    General Appearance: Appears well a  Respiratory: respirations are even and unlabored   GI: Abdomen is soft and non-tender  Skin:scattered excoriations over extremities     Medical Decision Making       37 MINUTES SPENT BY ME on the date of service doing chart review, history, exam, documentation & further activities per the note.      Data "     I have personally reviewed the following data over the past 24 hrs:    10.2  \   14.4   / 477 (H)     141 105 15.7 /  197 (H)   4.0 23 0.71 \     ALT: 11 AST: 17 AP: 111 TBILI: 0.6   ALB: 3.6 TOT PROTEIN: 6.8 LIPASE: 40     Procal: N/A CRP: N/A Lactic Acid: 1.8         Imaging results reviewed over the past 24 hrs:   Recent Results (from the past 24 hours)   CT Abdomen Pelvis w Contrast    Narrative    EXAMINATION: CT ABDOMEN PELVIS W CONTRAST, 11/11/2024 5:45 PM    INDICATION: Left-sided abdominal pain vomiting history of multiple  bowel obstructions    COMPARISON STUDY: CT 10/22/2024    TECHNIQUE: CT scan of the abdomen and pelvis was performed on  multidetector CT scanner using volumetric acquisition technique and  images were reconstructed in multiple planes with variable thickness  and reviewed on dedicated workstations.     CONTRAST: iopamidol (ISOVUE-370) solution 99 mL IV without oral  contrast    CT scan radiation dose is optimized to minimum requisite dose using  automated dose modulation techniques.    FINDINGS:    Lower thorax: Bibasilar dependent subsegmental atelectasis.    Liver: No mass. No intrahepatic biliary ductal dilation.    Biliary System: Cholelithiasis. No extrahepatic biliary dilation.    Pancreas: No mass or pancreatic ductal dilation.    Adrenal glands: No mass or nodules    Spleen: Normal.    Kidneys: No suspicious mass, obstructing calculus or hydronephrosis.    Gastrointestinal tract: Multiple dilated, fluid-filled loops of small  bowel within the left upper quadrant, measuring up to 5 cm. Transition  point likely within the left upper quadrant series 4 images 188  through 208. There is also a relative transition point in the right  abdomen, series 4 images 140 through 156. Bowel loops upstream and  downstream from these regions are difficult to follow. The duodenum  and upstream jejunum are not distended. Areas of questionable  hyperemia and thickening in bowel loops in the mid to  lower left  abdomen for example series 4 image 230. No signs of enteritis noted on  recent MRI enterography 10/26/2024 suggesting some of this may be  related to peristalsis. No wall thickening of dilated loop however  there is some mild adjacent fat stranding, series 4 image 142. No  evidence of perforation. Large bowel is unremarkable. Diverticulosis.  History of appendectomy with likely small residual appendix (series 4,  image 257).    Pelvis: Urinary bladder is normal. Penile implant with collapsed  reservoir, stable in appearance.     Mesentery/peritoneum/retroperitoneum: No mass. No free air.    Lymph nodes: No significant lymphadenopathy.    Vasculature: Scattered atherosclerotic calcification of abdominal  aorta with no aneurysmal dilation.     Soft tissues: Within normal limits.    Osseous structures: No aggressive or acute osseous lesion.      Impression    IMPRESSION:   1.  Small bowel obstruction centered about the left upper quadrant  with segmental dilatation of loops of small bowel measuring up to 5  cm. No evidence of perforation. There are 2 transition points  suspected as discussed above with intervening dilated segment of small  bowel with upstream and downstream collapse. Correlation with  patient's symptoms and presentation for developing closed-loop  obstruction given findings. This may be secondary to adhesions given  no abnormality noted on recent MRI enterography. Similar appearance is  also noted on several prior CT studies.  2.  Other chronic findings as above.    Findings discussed with the emergency room physician by Dr. Jose Ramon Alvarez at 7:35 PM 11/11/2024. Provider will relay findings to  general surgery consult team.    I have personally reviewed the examination and initial interpretation  and I agree with the findings.    APRIL ELIZONDO MD         SYSTEM ID:  [id number]   XR Abdomen Port 1 View    Narrative    EXAM: XR ABDOMEN PORT 1 VIEW  LOCATION: [facility]  DATE: 11/11/2024    INDICATION: NGT placement  COMPARISON: CT abdomen/pelvis 11/11/2024.      Impression    IMPRESSION: Nasogastric tube with tip and sidehole overlying the stomach. Excreted contrast noted in the renal system bilaterally. Known mechanical obstruction better seen on same day CT. Visualized lung bases are clear. No acute bony abnormality.

## 2024-11-12 NOTE — CONSULTS
[facility] emergency general surgery Consultation    Andrew Collier MRN# [id number]   Age: 58 year old YOB: 1965     Date of Admission:  11/11/2024    Date of Consult:   11/11/24    Reason for consult: Recurrent small bowel obstruction       Requesting service: Emergency Medicine; requesting provider: Adri Oswald PA-C                   Assessment and Plan:   Assessment:   Andrew Collier is a 58 year old male with history significant for recurrent SBO, poorly controlled T2DM, HIV on ART who presents with 1.5 days of Abdominal Pain; Nausea, Vomiting, & Diarrhea; and Hyperglycemia with CT demonstrating LUQ SBO. Surgery was consulted for management of SBO.   His pelvic/abdominal CT demonstrates two transition points concerning for closed-loop obstruction. He does have significant past abdominal surgery history, including diagnostic laproscopies in 2016 and 2018 for obstruction, as well as a laparoscopic appendectomy in 2018. His obstruction at this time is likely due to adhesions, similar to previous encounters.  His NG tube was inserted in ED with around 1L of output with significant relief of abdominal pain. He is diffusely nontender to palpation and does not demonstrate any peritoneal signs. His lactate is within normal limits, reassuring with regard to ischemia.  He does have elevated blood glucose and ketones concerning for DKA.        Plan:   At this time we will continue to pursue non-operative management given improvement with NG tube decompression. We will continue treating his pain. He should remain NPO.    #Small bowel obstruction  - no surgical intervention indicated at this time  - NPO  - IV fluid resuscitation  - decompressive NG tube in place to LIS  - ondansetron  -Repeat labs in the morning including lactic acid  - admit to medicine given concurrent concern for DKA  - plan for gastrografin challenge tomorrow after decompression      Discussed with Chief  resident Dr. Driscoll and staff, Dr. Milind Daniel MD  PGY2 - Surgery            Chief Complaint:            History of Present Illness:   Andrew Collier is a 58 year old male with history significant for recurrent SBO, poorly controlled T2DM, HIV on ART who presents with Abdominal Pain; Nausea, Vomiting, & Diarrhea; and Hyperglycemia with CT demonstrating LUQ SBO. Surgery was consulted for management of SBO.   Mr. Fabián Collier states he experienced onset of abdominal pain yesterday evening which worsened with intake of food and fluids. This morning pain persisted and he began to have nausea and vomiting. Pain is mostly localized to the right abdomen. His pain does not radiate. Vomit appeared to be digested food, no blood. His last bowel movement was this morning at 8am, typical in size and color. He states he has not passed gas, but is not typically flatulent. He has history of recurrent SBO and was recently was admitted for an obstruction about 2 months ago which was treated conservatively. He feels his symptoms are the same as this most recent episode. Last PO intake was this morning. He is adamant that he does not want surgery. He denies fever, chills.   His past surgical history significant for laparoscopic appendectomy and 2 diagnostic laparoscopy for bowel obstruction in 2016 and 2018            Past Medical History:     Past Medical History:   Diagnosis Date    Acute appendicitis with localized peritonitis 1/31/2018    AIDS (H)     Allergic rhinitis due to other allergen     DNS    Anal dysplasia     Chronic abdominal pain     CNS toxoplasmosis (H)     COVID-19 1/11/2022    Diabetes type 2, controlled (H)     GERD (gastroesophageal reflux disease)     Herpes zoster 9/23/2016    History of seizure     History of substance abuse (H)     HIV (human immunodeficiency virus infection) (H)     HLD (hyperlipidemia)     Lung nodules     Periungual wart     Pneumonia 1/6/2019    PTSD  (post-traumatic stress disorder)     Sleep apnea     doesn't use CPAP             Past Surgical History:     Past Surgical History:   Procedure Laterality Date    ANOSCOPY N/A 9/9/2020    Procedure: Exam Under Anesthesia, ANOSCOPY, fulgeration of rectal fissures with Rectal Biopsies;  Surgeon: Thanh Lundberg MD;  Location: [facility] OR    COLONOSCOPY Left 1/22/2016    Procedure: COMBINED COLONOSCOPY, SINGLE OR MULTIPLE BIOPSY/POLYPECTOMY BY BIOPSY;  Surgeon: Clark aSini MD;  Location: [facility] GI    ESOPHAGOSCOPY, GASTROSCOPY, DUODENOSCOPY (EGD), COMBINED N/A 6/6/2022    Procedure: ESOPHAGOGASTRODUODENOSCOPY, WITH BIOPSY;  Surgeon: Kecia Benítez MD;  Location: [facility] GI    ESOPHAGOSCOPY, GASTROSCOPY, DUODENOSCOPY (EGD), COMBINED N/A 10/10/2024    Procedure: Esophagoscopy, gastroscopy, duodenoscopy (EGD), combined;  Surgeon: Nico Bhatti DO;  Location: [facility] GI    HC EXPLORE UNDESC TESTIS,INGUIN/SCROTAL      LAPAROSCOPIC APPENDECTOMY N/A 1/31/2018    Procedure: LAPAROSCOPIC APPENDECTOMY;  LAPAROSCOPIC APPENDECTOMY;  Surgeon: Dawn Holt MD;  Location: [facility] OR    LAPAROSCOPY DIAGNOSTIC (GENERAL) N/A 7/26/2016    Procedure: LAPAROSCOPY DIAGNOSTIC (GENERAL);  Surgeon: Susannah Arriaga MD;  Location: [facility] OR    LAPAROSCOPY DIAGNOSTIC (GENERAL) N/A 4/16/2018    Procedure: LAPAROSCOPY DIAGNOSTIC (GENERAL);  Diagnostic laparoscopy and lysis of adhesions;  Surgeon: Prince Dowling MD;  Location: [facility] OR    OPTICAL TRACKING SYSTEM CRANIOTOMY, EXCISE TUMOR, COMBINED Left 4/10/2015    Procedure: COMBINED OPTICAL TRACKING SYSTEM CRANIOTOMY, EXCISE TUMOR;  Surgeon: Mirlande Colmenares MD;  Location: [facility] OR    REPAIR GAMEKEEPER'S THUMB Right 12/2/2016    Procedure: REPAIR LIGAMENT ULNAR COLLATERAL THUMB (GAMEKEEPER'S);  Surgeon: Evin Zamorano MD;  Location: [facility] OR    ZZC NONSPECIFIC PROCEDURE      right forearm fracture             Social History:     Social History     Tobacco Use    Smoking status: Some  "Days     Current packs/day: 0.25     Average packs/day: 0.3 packs/day for 40.0 years (10.0 ttl pk-yrs)     Types: Cigarettes    Smokeless tobacco: Former   Substance Use Topics    Alcohol use: No     Alcohol/week: 0.0 standard drinks of alcohol     Comment: Last etoh in 2007             Family History:     Family History   Problem Relation Age of Onset    Diabetes Brother     Diabetes Father     Alzheimer Disease Father     Unknown/Adopted Mother     Diabetes Paternal Grandfather     Cancer No family hx of         no skin cancer    Skin Cancer No family hx of         no famiy hx of skin cancer    Glaucoma No family hx of     Macular Degeneration No family hx of                 Allergies:     Allergies   Allergen Reactions    Fentanyl Blisters     Per pt, after taking this medication had blisters develope    Tylenol [Acetaminophen] Itching    Dulaglutide Rash    Insulin Lispro Rash     Patient reported    Penicillin V Other (See Comments) and Rash     Diffuse maculopapular rash + feels \"high\", per pt.              Medications:     Current Facility-Administered Medications   Medication Dose Route Frequency Provider Last Rate Last Admin    albuterol (PROVENTIL HFA/VENTOLIN HFA) inhaler  2 puff Inhalation Q6H PRN Anna Mccarthy APRN CNP        bictegravir-emtricitabine-tenofovir (BIKTARVY) -25 MG per tablet 1 tablet  1 tablet Oral Daily Anna Mccarthy APRN CNP        calcium carbonate (TUMS) chewable tablet 1,000 mg  1,000 mg Oral 4x Daily PRN Anna Mccarthy APRN CNP        glucose gel 15-30 g  15-30 g Oral Q15 Min PRN Anna Mccarthy APRN CNP        Or    dextrose 50 % injection 25-50 mL  25-50 mL Intravenous Q15 Min PRN Anna Mccarthy APRN CNP        Or    glucagon injection 1 mg  1 mg Subcutaneous Q15 Min PRN Anna Mccarthy APRN CNP        HYDROmorphone (DILAUDID) injection 1 mg  1 mg Intravenous Q3H PRN Anna Mccarthy APRN CNP   1 mg at " 11/11/24 2341    insulin aspart (NovoLOG) injection (RAPID ACTING)  1-12 Units Subcutaneous Q4H Anna Mccarthy APRN CNP   6 Units at 11/11/24 2350    insulin glargine (LANTUS PEN) injection 20 Units  20 Units Subcutaneous At Bedtime Anna Mccarthy APRN CNP        ketorolac (TORADOL) injection 30 mg  30 mg Intravenous Q6H PRN Anna Mccarthy APRN CNP        lactated ringers BOLUS 1,000 mL  1,000 mL Intravenous Once Anna Mccarthy APRN  mL/hr at 11/12/24 0028 1,000 mL at 11/12/24 0028    lidocaine (LMX4) cream   Topical Q1H PRN Anna Mccarthy APRN CNP        lidocaine 1 % 0.1-1 mL  0.1-1 mL Other Q1H PRN Anna Mccarthy APRN CNP        melatonin tablet 5 mg  5 mg Oral At Bedtime PRN Anna Mccarthy APRN CNP        ondansetron (ZOFRAN ODT) ODT tab 4 mg  4 mg Oral Q6H PRN Anna Mccarthy APRN CNP        Or    ondansetron (ZOFRAN) injection 4 mg  4 mg Intravenous Q6H PRN Anna Mccarthy APRN CNP        pantoprazole (PROTONIX) IV push injection 40 mg  40 mg Intravenous BID Anna Mccarthy APRN CNP   40 mg at 11/11/24 2121    senna-docusate (SENOKOT-S/PERICOLACE) 8.6-50 MG per tablet 1 tablet  1 tablet Oral BID PRN Anna Mccarthy APRN CNP        Or    senna-docusate (SENOKOT-S/PERICOLACE) 8.6-50 MG per tablet 2 tablet  2 tablet Oral BID PRN Anna Mccarthy APRN CNP        senna-docusate (SENOKOT-S/PERICOLACE) 8.6-50 MG per tablet 1 tablet  1 tablet Oral BID Anna Mccarthy APRN CNP   1 tablet at 11/11/24 2340    Or    senna-docusate (SENOKOT-S/PERICOLACE) 8.6-50 MG per tablet 2 tablet  2 tablet Oral BID Anna Mccarthy APRN CNP        sodium chloride (PF) 0.9% PF flush 3 mL  3 mL Intracatheter Q8H Anna Mccarthy APRN CNP   3 mL at 11/11/24 2341    sodium chloride (PF) 0.9% PF flush 3 mL  3 mL Intracatheter q1 min prn Anna Mccarthy APRN CNP         Current Outpatient  Medications   Medication Sig Dispense Refill    albuterol (PROAIR HFA/PROVENTIL HFA/VENTOLIN HFA) 108 (90 Base) MCG/ACT inhaler Inhale 2 puffs into the lungs every 6 hours as needed for shortness of breath, wheezing or cough. 18 g 0    benzonatate (TESSALON) 100 MG capsule Take 1 capsule (100 mg) by mouth 3 times daily as needed for cough. 21 capsule 0    bictegravir-emtricitabine-tenofovir (BIKTARVY) -25 MG per tablet Take 1 tablet by mouth daily 30 tablet 2    blood glucose (NO BRAND SPECIFIED) lancets standard Use to test blood sugar 1 time daily or as directed. 100 Lancet 4    blood glucose (NO BRAND SPECIFIED) test strip Use to test blood sugar 1 time daily or as directed. 100 strip 4    blood glucose monitoring (NO BRAND SPECIFIED) meter device kit Use to test blood sugar 3 times daily or as directed. 1 kit 0    Continuous Glucose Sensor (DEXCOM G6 SENSOR) MISC 1 each every 10 days Change every 10 days. 3 each 11    Continuous Glucose Sensor (DEXCOM G7 SENSOR) MISC 1 Device every 10 days. 9 each 3    Continuous Glucose Transmitter (DEXCOM G6 TRANSMITTER) MISC 1 each every 3 months Change every 3 months. 1 each 3    insulin aspart (NOVOLOG PEN) 100 UNIT/ML pen Inject 10 Units subcutaneously 3 times daily (with meals). Take only when you have access to a meal. 15 mL 3    insulin glargine (LANTUS PEN) 100 UNIT/ML pen Inject 28 Units subcutaneously every morning. REDUCE TO 15 UNITS IF YOU THINK YOU WILL NOT HAVE FOOD THAT DAY. 15 mL 3    insulin pen needle (32G X 4 MM) 32G X 4 MM miscellaneous Use 4-5 pen needles daily or as directed. 100 each 2    naproxen (NAPROSYN) 500 MG tablet Take 1 tablet (500 mg) by mouth 2 times daily as needed for moderate pain. Take with meals. 30 tablet 0    Nutritional Supplements (ENSURE ACTIVE) LIQD Take 414 mLs by mouth daily 30 mL 11    pantoprazole (PROTONIX) 40 MG EC tablet Take 1 tablet (40 mg) by mouth 2 times daily (before meals). 60 tablet 0    polymixin b-trimethoprim  (POLYTRIM) 00523-7.1 UNIT/ML-% ophthalmic solution Place 2 drops Into the left eye every 2 hours (while awake). 10 mL 0    sennosides (SENOKOT) 8.6 MG tablet Take 1 tablet by mouth daily as needed for constipation. 30 tablet 1               Review of Systems:   CONSTITUTIONAL: NEGATIVE for fever, chills, change in weight  CV: NEGATIVE for chest pain, palpitations or peripheral edema  GI: Positive for nausea, vomiting, abdominal pain NEGATIVE for diarrhea, hematemesis, hematochezia, and melena  C: NEGATIVE for fever, chills, change in weight  E/M: NEGATIVE for ear, mouth and throat problems  R: NEGATIVE for significant cough or SOB          Physical Exam:   All vitals have been reviewed  Temp:  [97.8  F (36.6  C)] 97.8  F (36.6  C)  Pulse:  [107] 107  Resp:  [20] 20  BP: (129)/(84) 129/84  SpO2:  [99 %] 99 %  No intake or output data in the 24 hours ending 11/11/24 1845  Physical Exam:  CV:   Appears well perfused    Respiratory:  Breathing nonlabored on room air.    Abdomen:   Scar of previous and descending testis noted in the right groin, laparoscopic scars , normal bowel sounds, non-distended, non-tender, voluntary guarding absent, and no masses palpated     Musculoskeletal:   Moving all extremities spontaneously  tone is normal             Data:   All laboratory data reviewed    Results:  BMP  Recent Labs   Lab 11/11/24  2201 11/11/24  1548 11/05/24  2329 11/05/24 2116 11/05/24 2008 11/05/24  1817   NA  --  136  --   --   --  128*   POTASSIUM  --  4.1  --   --   --  4.2   CHLORIDE  --  98  --   --   --  92*   CO2  --  20*  --   --   --  26   BUN  --  20.6  --   --   --  21.8   CR  --  0.74  --   --   --  0.76   * 438* 320* 387*   < > 464*    < > = values in this interval not displayed.     CBC  Recent Labs   Lab 11/11/24  1548 11/05/24  1817   WBC 10.2 10.5   HGB 14.4 12.3*   * 358     LFT  Recent Labs   Lab 11/11/24  1548 11/05/24  1817   AST 17 15   ALT 14 13   ALKPHOS 178* 152*   BILITOTAL 0.8  0.5   ALBUMIN 4.6 4.1     Recent Labs   Lab 11/11/24  2201 11/11/24  1548 11/05/24  2329 11/05/24  2116 11/05/24 2008 11/05/24  1817   * 438* 320* 387* 399* 464*       Imaging:  CT:  Results for orders placed or performed during the hospital encounter of 11/11/24   CT Abdomen Pelvis w Contrast    Impression    IMPRESSION:   1.  Small bowel obstruction centered about the left upper quadrant  with segmental dilatation of loops of small bowel measuring up to 5  cm. No evidence of perforation. There are 2 transition points  suspected as discussed above with intervening dilated segment of small  bowel with upstream and downstream collapse. Correlation with  patient's symptoms and presentation for developing closed-loop  obstruction given findings. This may be secondary to adhesions given  no abnormality noted on recent MRI enterography. Similar appearance is  also noted on several prior CT studies.  2.  Other chronic findings as above.    Findings discussed with the emergency room physician by Dr. Jose Ramon Alvarez at 7:35 PM 11/11/2024. Provider will relay findings to  general surgery consult team.    I have personally reviewed the examination and initial interpretation  and I agree with the findings.    APRIL ELIZONDO MD         SYSTEM ID:  [id number]              Aracelis Daniel MD, MS4

## 2024-11-13 LAB
ALBUMIN SERPL BCG-MCNC: 3.4 G/DL (ref 3.5–5.2)
ALP SERPL-CCNC: 95 U/L (ref 40–150)
ALT SERPL W P-5'-P-CCNC: 8 U/L (ref 0–70)
ANION GAP SERPL CALCULATED.3IONS-SCNC: 11 MMOL/L (ref 7–15)
AST SERPL W P-5'-P-CCNC: 15 U/L (ref 0–45)
BILIRUB SERPL-MCNC: 0.6 MG/DL
BUN SERPL-MCNC: 14.4 MG/DL (ref 8–23)
CALCIUM SERPL-MCNC: 9 MG/DL (ref 8.8–10.4)
CHLORIDE SERPL-SCNC: 103 MMOL/L (ref 98–107)
CREAT SERPL-MCNC: 0.68 MG/DL (ref 0.67–1.17)
EGFRCR SERPLBLD CKD-EPI 2021: >90 ML/MIN/1.73M2
GLUCOSE BLDC GLUCOMTR-MCNC: 208 MG/DL (ref 70–99)
GLUCOSE BLDC GLUCOMTR-MCNC: 233 MG/DL (ref 70–99)
GLUCOSE BLDC GLUCOMTR-MCNC: 309 MG/DL (ref 70–99)
GLUCOSE BLDC GLUCOMTR-MCNC: 367 MG/DL (ref 70–99)
GLUCOSE BLDC GLUCOMTR-MCNC: 87 MG/DL (ref 70–99)
GLUCOSE BLDC GLUCOMTR-MCNC: 94 MG/DL (ref 70–99)
GLUCOSE SERPL-MCNC: 87 MG/DL (ref 70–99)
HCO3 SERPL-SCNC: 23 MMOL/L (ref 22–29)
MAGNESIUM SERPL-MCNC: 1.8 MG/DL (ref 1.7–2.3)
PHOSPHATE SERPL-MCNC: 3.3 MG/DL (ref 2.5–4.5)
POTASSIUM SERPL-SCNC: 3.6 MMOL/L (ref 3.4–5.3)
POTASSIUM SERPL-SCNC: 3.7 MMOL/L (ref 3.4–5.3)
PROT SERPL-MCNC: 6.5 G/DL (ref 6.4–8.3)
SODIUM SERPL-SCNC: 137 MMOL/L (ref 135–145)

## 2024-11-13 PROCEDURE — 83735 ASSAY OF MAGNESIUM: CPT | Performed by: TRANSPLANT SURGERY

## 2024-11-13 PROCEDURE — 84132 ASSAY OF SERUM POTASSIUM: CPT | Performed by: TRANSPLANT SURGERY

## 2024-11-13 PROCEDURE — 250N000011 HC RX IP 250 OP 636: Performed by: NURSE PRACTITIONER

## 2024-11-13 PROCEDURE — 258N000003 HC RX IP 258 OP 636

## 2024-11-13 PROCEDURE — 82247 BILIRUBIN TOTAL: CPT | Performed by: STUDENT IN AN ORGANIZED HEALTH CARE EDUCATION/TRAINING PROGRAM

## 2024-11-13 PROCEDURE — 36415 COLL VENOUS BLD VENIPUNCTURE: CPT | Performed by: TRANSPLANT SURGERY

## 2024-11-13 PROCEDURE — 82962 GLUCOSE BLOOD TEST: CPT

## 2024-11-13 PROCEDURE — 84155 ASSAY OF PROTEIN SERUM: CPT | Performed by: STUDENT IN AN ORGANIZED HEALTH CARE EDUCATION/TRAINING PROGRAM

## 2024-11-13 PROCEDURE — 80053 COMPREHEN METABOLIC PANEL: CPT | Performed by: STUDENT IN AN ORGANIZED HEALTH CARE EDUCATION/TRAINING PROGRAM

## 2024-11-13 PROCEDURE — 84100 ASSAY OF PHOSPHORUS: CPT

## 2024-11-13 PROCEDURE — 99232 SBSQ HOSP IP/OBS MODERATE 35: CPT | Performed by: STUDENT IN AN ORGANIZED HEALTH CARE EDUCATION/TRAINING PROGRAM

## 2024-11-13 PROCEDURE — 120N000005 HC R&B MS OVERFLOW UMMC

## 2024-11-13 PROCEDURE — 250N000013 HC RX MED GY IP 250 OP 250 PS 637: Performed by: NURSE PRACTITIONER

## 2024-11-13 PROCEDURE — 250N000009 HC RX 250: Performed by: NURSE PRACTITIONER

## 2024-11-13 RX ADMIN — INSULIN ASPART 4 UNITS: 100 INJECTION, SOLUTION INTRAVENOUS; SUBCUTANEOUS at 00:40

## 2024-11-13 RX ADMIN — BICTEGRAVIR SODIUM, EMTRICITABINE, AND TENOFOVIR ALAFENAMIDE FUMARATE 1 TABLET: 50; 200; 25 TABLET ORAL at 08:53

## 2024-11-13 RX ADMIN — INSULIN ASPART 3 UNITS: 100 INJECTION, SOLUTION INTRAVENOUS; SUBCUTANEOUS at 18:50

## 2024-11-13 RX ADMIN — SODIUM CHLORIDE, POTASSIUM CHLORIDE, SODIUM LACTATE AND CALCIUM CHLORIDE: 600; 310; 30; 20 INJECTION, SOLUTION INTRAVENOUS at 03:01

## 2024-11-13 RX ADMIN — PANTOPRAZOLE SODIUM 40 MG: 40 INJECTION, POWDER, FOR SOLUTION INTRAVENOUS at 21:02

## 2024-11-13 RX ADMIN — SENNOSIDES AND DOCUSATE SODIUM 1 TABLET: 8.6; 5 TABLET ORAL at 08:52

## 2024-11-13 RX ADMIN — INSULIN ASPART 10 UNITS: 100 INJECTION, SOLUTION INTRAVENOUS; SUBCUTANEOUS at 15:43

## 2024-11-13 RX ADMIN — HYDROMORPHONE HYDROCHLORIDE 1 MG: 1 INJECTION, SOLUTION INTRAMUSCULAR; INTRAVENOUS; SUBCUTANEOUS at 21:03

## 2024-11-13 RX ADMIN — PANTOPRAZOLE SODIUM 40 MG: 40 INJECTION, POWDER, FOR SOLUTION INTRAVENOUS at 08:53

## 2024-11-13 RX ADMIN — INSULIN GLARGINE 20 UNITS: 100 INJECTION, SOLUTION SUBCUTANEOUS at 23:07

## 2024-11-13 RX ADMIN — SENNOSIDES AND DOCUSATE SODIUM 2 TABLET: 8.6; 5 TABLET ORAL at 21:03

## 2024-11-13 RX ADMIN — INSULIN ASPART 7 UNITS: 100 INJECTION, SOLUTION INTRAVENOUS; SUBCUTANEOUS at 23:12

## 2024-11-13 NOTE — PLAN OF CARE
Goal Outcome Evaluation:      Plan of Care Reviewed With: patient    Overall Patient Progress: no changeOverall Patient Progress: no change       Patient alert and oriented x4, denies pain. Denies nausea/vomiting. On clear liquid diet. Able to make needs known.

## 2024-11-13 NOTE — PROGRESS NOTES
Surgery Progress Note  11/13/2024       Subjective:  - No acute events overnight. GGC completed yesterday with contrast visualized throughout colon and rectum. Pain is improved. Patient endorses passing gas and bowel movement yesterday      Objective:  Temp:  [97.6  F (36.4  C)-98.2  F (36.8  C)] 97.6  F (36.4  C)  Pulse:  [83] 83  Resp:  [16] 16  BP: (120-150)/(76-89) 120/76  SpO2:  [100 %] 100 %    I/O last 3 completed shifts:  In: -   Out: 125 [Urine:125]      Gen: Awake, alert, NAD  Resp: NLB on RA  Abd: soft, nondistended, non-tender  Ext: WWP, no edema     Labs:  Recent Labs   Lab 11/11/24  1548   WBC 10.2   HGB 14.4   *       Recent Labs   Lab 11/13/24  0400 11/13/24  0026 11/12/24 2003 11/12/24  1122 11/12/24  1031 11/12/24  0430 11/12/24  0233 11/11/24  2201 11/11/24  1548   NA  --   --   --   --   --   --  141  --  136   POTASSIUM  --   --   --   --   --   --  4.0  --  4.1   CHLORIDE  --   --   --   --   --   --  105  --  98   CO2  --   --   --   --   --   --  23  --  20*   BUN  --   --   --   --   --   --  15.7  --  20.6   CR  --   --   --   --   --   --  0.71  --  0.74   GLC 87 233* 127*   < >  --    < > 127*   < > 438*   LINDA  --   --   --   --   --   --  9.1  --  10.1   MAG  --   --   --   --   --   --  2.3  --  2.5*   PHOS  --   --   --   --  3.3  --  2.4*  --  1.5*    < > = values in this interval not displayed.       Imaging:  XR Abdomen Port 1 View    Result Date: 11/11/2024  EXAM: XR ABDOMEN PORT 1 VIEW LOCATION: [facility] DATE: 11/11/2024 INDICATION: NGT placement COMPARISON: CT abdomen/pelvis 11/11/2024.     IMPRESSION: Nasogastric tube with tip and sidehole overlying the stomach. Excreted contrast noted in the renal system bilaterally. Known mechanical obstruction better seen on same day CT. Visualized lung bases are clear. No acute bony abnormality.    CT Abdomen Pelvis w Contrast    Result Date: 11/11/2024  EXAMINATION: CT ABDOMEN PELVIS W  CONTRAST, 11/11/2024 5:45 PM INDICATION: Left-sided abdominal pain vomiting history of multiple bowel obstructions COMPARISON STUDY: CT 10/22/2024 TECHNIQUE: CT scan of the abdomen and pelvis was performed on multidetector CT scanner using volumetric acquisition technique and images were reconstructed in multiple planes with variable thickness and reviewed on dedicated workstations. CONTRAST: iopamidol (ISOVUE-370) solution 99 mL IV without oral contrast CT scan radiation dose is optimized to minimum requisite dose using automated dose modulation techniques. FINDINGS: Lower thorax: Bibasilar dependent subsegmental atelectasis. Liver: No mass. No intrahepatic biliary ductal dilation. Biliary System: Cholelithiasis. No extrahepatic biliary dilation. Pancreas: No mass or pancreatic ductal dilation. Adrenal glands: No mass or nodules Spleen: Normal. Kidneys: No suspicious mass, obstructing calculus or hydronephrosis. Gastrointestinal tract: Multiple dilated, fluid-filled loops of small bowel within the left upper quadrant, measuring up to 5 cm. Transition point likely within the left upper quadrant series 4 images 188 through 208. There is also a relative transition point in the right abdomen, series 4 images 140 through 156. Bowel loops upstream and downstream from these regions are difficult to follow. The duodenum and upstream jejunum are not distended. Areas of questionable hyperemia and thickening in bowel loops in the mid to lower left abdomen for example series 4 image 230. No signs of enteritis noted on recent MRI enterography 10/26/2024 suggesting some of this may be related to peristalsis. No wall thickening of dilated loop however there is some mild adjacent fat stranding, series 4 image 142. No evidence of perforation. Large bowel is unremarkable. Diverticulosis. History of appendectomy with likely small residual appendix (series 4, image 257). Pelvis: Urinary bladder is normal. Penile implant with collapsed  reservoir, stable in appearance. Mesentery/peritoneum/retroperitoneum: No mass. No free air. Lymph nodes: No significant lymphadenopathy. Vasculature: Scattered atherosclerotic calcification of abdominal aorta with no aneurysmal dilation. Soft tissues: Within normal limits. Osseous structures: No aggressive or acute osseous lesion.     IMPRESSION: 1.  Small bowel obstruction centered about the left upper quadrant with segmental dilatation of loops of small bowel measuring up to 5 cm. No evidence of perforation. There are 2 transition points suspected as discussed above with intervening dilated segment of small bowel with upstream and downstream collapse. Correlation with patient's symptoms and presentation for developing closed-loop obstruction given findings. This may be secondary to adhesions given no abnormality noted on recent MRI enterography. Similar appearance is also noted on several prior CT studies. 2.  Other chronic findings as above. Findings discussed with the emergency room physician by Dr. Jose Ramon Alvarez at 7:35 PM 11/11/2024. Provider will relay findings to general surgery consult team. I have personally reviewed the examination and initial interpretation and I agree with the findings. APRIL ELIZONDO MD   SYSTEM ID:  [id number]    XR Chest 2 Views    Result Date: 11/5/2024  EXAM: XR CHEST 2 VIEWS LOCATION: [facility] DATE: 11/5/2024 INDICATION: HIV, persistent cough COMPARISON: None.     IMPRESSION: Negative chest.    XR Knee Left 3 Views    Result Date: 10/30/2024  EXAM: LEFT KNEE 3 VIEWS LOCATION: [facility] DATE/TIME: 10/29/2024 11:38 PM CDT INDICATION: Fall. Pain. COMPARISON: None.     IMPRESSION: 1. A minimal focal cortical contour irregularity of the proximal metaphysis of the left fibula. This most likely relates to an old healed fracture. A nondisplaced acute fracture cannot be entirely excluded.  "2. No other findings suspicious for acute fracture or malalignment of the left knee. 3. No left knee joint effusion. 4. Apparent mild prepatellar soft tissue swelling. 5. Minimal tricompartmental degenerative changes in the knee.    MR Enterography wo and w Contrast    Addendum Date: 10/26/2024    International evidence-based [place] guidelines for the management of intraductal papillary mucinous neoplasm of the pancreas: Surveillance is recommended if no high risk stigmata or worrisome features are present: Primary imaging modalities recommended are MRI/MRCP or MDCT Size of largest cyst: *  Less than 20 mm: Follow-up imaging in 6 months once, then every 18 months if no change. Stop surveillance if stable for 5 years. *  More than 20 mm but less than 30 mm: Follow-up imaging at 6 months, 12 months, then yearly if no change. *  More than 30 mm- follow up imaging every 6 months. GI consultation for surgery/endoscopic ultrasound is recommended for cysts with \"high-risk stigmata\" of high grade dysplasia or invasive carcinoma such as: 1. Obstructive jaundice in a patient with cystic lesion of the head of the pancreas 2. Enhancing mural nodule > 5mm or solid component 3. Main pancreatic duct >10mm 4. Suspicious or positive results of cytology If worrisome features are present, GI consultation is recommended since management is based on multiple factors. The patient may go for surgery/surveillance: Worrisome features on imagin. Cyst more than or equal to 30mm 2. Thickened or enhancing cyst walls 3. Main pancreatic duct > 5mm and < 10mm. 4. Abrupt change in caliber of pancreatic duct with distal atrophy 5. Lymphadenopathy 6. Cyst growth rate > 2.5mm/year *Reference: International evidence-based [place] guidelines for the management of intraductal papillary mucinous neoplasm of the pancreas Pancreatology: 24:(); 255-270. [email or web address]/j.[email or web address] JANIE VANN MD   SYSTEM ID:  [id number]    Result Date: " 10/26/2024  EXAMINATION: MR ENTEROGRAPHY W/O AND W CONTRAST, 10/26/2024 11:49 AM TECHNIQUE: Multiplanar, multisequence MRI imaging of the abdomen and pelvis was obtained using MR enterography protocol without and with intravenous gadolinium. Contrast dose: 7mL Gadavist COMPARISON: CT abdomen and pelvis 10/22/2024 HISTORY: Recurrent SBO FINDINGS: Exam quality: Inadequate distention of the small bowel limits evaluation Small and large bowel: The small and large bowel are normal in caliber. There is no evidence of obstruction there is no abnormal wall thickening or hyperenhancement. Remainder of exam: The stomach is distended with fluid and ingested food. No wall thickening or mass in the stomach or duodenum. Cholelithiasis. Normal appearance of the liver, biliary tree, spleen, adrenals and kidneys. 2 mm cyst in the pancreatic tail (series 8, image 32). There is mild thickening of the urinary bladder which may be due to prostatomegaly. No ascites or lymphadenopathy. Aorta and its major branches are patent. No suspicious osseous lesions.     IMPRESSION: 1.  No abnormality demonstrated in the bowel; specifically no evidence of bowel obstruction or acute inflammation. 2.  The stomach is distended with fluid and ingested food but there is no obstructing mass or wall thickening. Consider nuclear medicine gastric emptying study to rule out gastroparesis. 3.  Cholelithiasis. 4.  Tiny pancreatic cyst may represent an IPMN. Follow-up guidelines are given below. 5.  Prostatomegaly with mild urinary bladder wall thickening. JANIE VANN MD   SYSTEM ID:  [id number]    XR Abdomen Port 1 View    Result Date: 10/26/2024  EXAMINATION:  XR ABDOMEN PORT 1 VIEW 10/26/2024 COMPARISON: Abdominal radiograph 10/23/2024 HISTORY: worsening LUQ abdominal pain, hx of SBO. TECHNIQUE: One frontal supine view of the abdomen. FINDINGS: No abnormally dilated loops of bowel project over the abdomen. Moderate colonic stool burden. Nonobstructive bowel  gas pattern. Partially visualized lung bases are within normal limits. Penile prosthesis.     IMPRESSION: Moderate colonic stool burden with nonobstructive bowel gas pattern. I have personally reviewed the examination and initial interpretation and I agree with the findings. APRIL ELIZONDO MD   SYSTEM ID:  [id number]    XR Abdomen Port 1 View    Result Date: 10/23/2024  EXAM: ABDOMEN SINGLE VIEW PORTABLE LOCATION: [facility] DATE/TIME: 10/23/2024 2:55 AM CDT INDICATION: Tube placement. COMPARISON: 11/10/2024.     IMPRESSION: An enteric drainage tube is present with the distal tip in the gastric body.     CT Abdomen Pelvis w Contrast    Result Date: 10/23/2024  EXAM: CT ABDOMEN PELVIS W CONTRAST LOCATION: [facility] DATE: 10/23/2024 INDICATION: abd pain COMPARISON: 10/21/2024 TECHNIQUE: CT scan of the abdomen and pelvis was performed following injection of IV contrast. Multiplanar reformats were obtained. Dose reduction techniques were used. CONTRAST: 92ml isovue 370 FINDINGS: LOWER CHEST: Dependent atelectasis in the posterior lung bases, greater on the left. HEPATOBILIARY: Gallbladder is nondistended and contains a calcified gallstone. No significant mass or bile duct dilatation. PANCREAS: No significant mass, duct dilatation, or inflammatory change. SPLEEN: Normal. ADRENAL GLANDS: Normal. KIDNEYS/BLADDER: The bilateral kidneys enhance symmetrically without evidence for hydronephrosis or pyelonephritis. No renal masses or calculi noted. The bilateral ureters and urinary bladder are unremarkable. BOWEL: Moderate to marked interval fluid-filled distention of the stomach. Clinical correlation for gastric outlet obstruction symptoms. Nonspecific bowel gas pattern otherwise. Appendix not seen. LYMPH NODES: No lymphadenopathy. VASCULATURE: No abdominal aortic aneurysm. Mild vascular calcifications abdominal aorta and common  iliac arteries. PELVIC ORGANS: Penile implant with reservoir left lower pelvis anteriorly. No pelvic free fluid. MUSCULOSKELETAL: No concerning osseous abnormalities. No inguinal hernias. Small periumbilical ventral hernia.     IMPRESSION: 1.  Moderate to marked interval fluid-filled distention of the stomach since prior exam. Clinical correlation for gastric outlet obstruction symptoms. 2.  Cholelithiasis without gallbladder distention. No bile duct dilatation. 3.  Dependent atelectasis in the posterior lung bases, greater on the left. 4.  Penile implant with reservoir left lower pelvis anteriorly. 5.  Small periumbilical ventral hernia.     CT Abdomen Pelvis w Contrast    Result Date: 10/21/2024  EXAMINATION: CT ABDOMEN PELVIS W CONTRAST, 10/21/2024 2:10 PM INDICATION: abdominal pain, h/o SBO COMPARISON STUDY: 10/10/2024 TECHNIQUE: CT scan of the abdomen and pelvis was performed on multidetector CT scanner using volumetric acquisition technique and images were reconstructed in multiple planes with variable thickness and reviewed on dedicated workstations. CONTRAST: 101cc of isovue 370 injected IV without oral contrast CT scan radiation dose is optimized to minimum requisite dose using automated dose modulation techniques. FINDINGS: Lower thorax: Esophageal thickening, similar to prior. Otherwise unremarkable lower chest. Liver: No mass. No intrahepatic biliary ductal dilation. Biliary System: Cholelithiasis without acute cholecystitis. No extrahepatic biliary ductal dilation. Pancreas: No mass or pancreatic ductal dilation. Adrenal glands: No mass or nodules Spleen: Normal. Kidneys: No suspicious mass, obstructing calculus or hydronephrosis. Gastrointestinal tract :Post-operative changes of appendectomy. Normal caliber small bowel with decreased distention of the previously dilated loops of small bowel in the left mid abdomen/left lower quadrant. Mesentery/peritoneum/retroperitoneum: No mass. No free fluid or air.  Lymph nodes: No significant lymphadenopathy. Vasculature: Patent major abdominal vasculature. Pelvis: Urinary bladder is normal.  Prostate is enlarged. Penile prosthesis with intrapelvic reservoir. Osseous structures: No aggressive or acute osseous lesion.   Soft tissues: Tiny soft tissue density focus in the subcutaneous fat in the right lower quadrant abdominal wall, similar to prior and potentially a injection granuloma.     IMPRESSION: 1. No acute intra-abdominal findings to explain the patient's abdominal pain. Decreased distention of previously dilated loops of small bowel in the left mid abdomen/left lower quadrant since 10/10/2024. 2. Incidental findings not substantially changed from 10/10/2024. I have personally reviewed the examination and initial interpretation and I agree with the findings. JOSE VITAL DO   SYSTEM ID:  [id number]    US Abdomen Limited (RUQ)    Result Date: 10/21/2024  EXAMINATION: Limited Abdominal Ultrasound, 10/21/2024 11:19 AM COMPARISON: CT AP 10/10/2024. Ultrasound abdomen Limited 10/15/2024. HISTORY: epigastric pain, h/o gallstones FINDINGS: Fluid: No evidence of ascites or pleural effusions. Liver: The liver demonstrates normal echotexture, measuring 14.9 cm in craniocaudal dimension. There is no focal mass. Left hepatic lobe is not well visualized. Gallbladder: Shadowing gallstone in the gallbladder neck only visualized in left lateral decubitus position. There is no wall thickening or pericholecystic fluid. Negative sonographic Mendes's sign. Bile Ducts: Both the intra- and extrahepatic biliary system are of normal caliber.  The common bile duct measures 3 mm in diameter. Pancreas: Obscured. Kidney: The right kidney measures 9.9 cm long. There is no hydronephrosis or hydroureter, no shadowing renal calculi, cystic lesion or mass.     IMPRESSION: Small calcified gallstone in the gallbladder neck. No sonographic evidence of acute cholecystitis. I have personally reviewed the  examination and initial interpretation and I agree with the findings. APRIL ELIZONDO MD   SYSTEM ID:  [id number]    US Abdomen Limited    Result Date: 10/15/2024  US ABDOMEN LIMITED 10/15/2024 9:38 AM CLINICAL HISTORY: abd pain; elevated total bilirubin; recent gallstone TECHNIQUE: Limited abdominal ultrasound. COMPARISON: CT of the abdomen and pelvis on 10/10/2024 FINDINGS: GALLBLADDER: The small calcified stone in the gallbladder neck seen on CT is not visible by ultrasound. No gallbladder wall thickening, or pericholecystic fluid. Negative sonographic Mendes's sign. BILE DUCTS: There is no biliary dilatation. The common duct measures 3mm. LIVER: Normal echogenicity and echotexture. No focal masses. RIGHT KIDNEY: No hydronephrosis. PANCREAS: The pancreas is largely obscured by overlying gas.     IMPRESSION: 1.  The small calcified stone in the gallbladder neck seen on the recent CT was not visible by ultrasound. No evidence for acute cholecystitis. No biliary ductal dilatation. ISAÍAS METCALF MD   SYSTEM ID:  [id number]        Assessment/Plan:   janes Collier is a 58 year old male with history significant for recurrent SBO, poorly controlled T2DM, HIV on ART who presents with abdominal pain, nausea, vomiting. CT demonstrates two transition points concerning for closed-loop obstruction. Patient with reassuring exam and abdomen is soft and non-distended. GGC challenge completed without concern of high grade bowel obstruction. Patient with return of bowel function.     - CLD, okay to ADAT   - okay to discharge from surgery perspective  - EGS will sign off at this time, please feel free to reach out with any questions or concerns     Seen, examined, and discussed with chief resident, who will discuss with staff.  - - - - - - - - - - - - - - - - - -  Momo Son, MD  General Surgery PGY-2   Pager: [phone]

## 2024-11-13 NOTE — PROGRESS NOTES
"Brief Surgery Crossover Note    Reviewed gastrografin challenge abdominal x-ray and paged primary medicine team recommending starting CLD per day Emergency General Surgery Team sign out.    /76 (BP Location: Left arm, Patient Position: Supine, Cuff Size: Adult Regular)   Pulse 83   Temp 97.6  F (36.4  C) (Oral)   Resp 16   Ht 1.626 m (5' 4\")   Wt 72.6 kg (160 lb)   SpO2 100%   BMI 27.46 kg/m      Jorge Garza MD  General Surgery PGY-1    **For on call schedules and contact information, please refer to [facility]**      "

## 2024-11-13 NOTE — PROGRESS NOTES
Neuro: A&Ox4  Cardiac: SR. VSS.   Respiratory: Sating above 95 on RA   GI/: Minimal urine output, bladder scan for 115cc, pt voided 120 ml after bladder scan. 1xBM after gastrograffin given.   Diet/appetite: NPO   Activity:  Independent   Pain: At acceptable level on current regimen.   Skin: No new deficits noted.  LDA's: 2 PIV LUE LR at 125 ml/hr    1845 Pt became agitated about being on precautions and left room to go to bathroom. Pt returned to room refusing tele and cpox, asked RN to leave room. Will re approach when pt has calmed down.

## 2024-11-13 NOTE — PROGRESS NOTES
RT checked on pt for CPAP at night and pt refused second night and order discontinue    Sonido Petty, RT

## 2024-11-14 VITALS
HEIGHT: 64 IN | HEART RATE: 90 BPM | BODY MASS INDEX: 26.95 KG/M2 | WEIGHT: 157.85 LBS | OXYGEN SATURATION: 100 % | TEMPERATURE: 99.1 F | DIASTOLIC BLOOD PRESSURE: 83 MMHG | SYSTOLIC BLOOD PRESSURE: 135 MMHG | RESPIRATION RATE: 22 BRPM

## 2024-11-14 PROBLEM — E11.10 DIABETIC KETOSIS (H): Status: RESOLVED | Noted: 2024-11-11 | Resolved: 2024-11-14

## 2024-11-14 LAB
GLUCOSE BLDC GLUCOMTR-MCNC: 125 MG/DL (ref 70–99)
GLUCOSE BLDC GLUCOMTR-MCNC: 203 MG/DL (ref 70–99)
GLUCOSE BLDC GLUCOMTR-MCNC: 250 MG/DL (ref 70–99)
GLUCOSE BLDC GLUCOMTR-MCNC: 308 MG/DL (ref 70–99)
MAGNESIUM SERPL-MCNC: 1.7 MG/DL (ref 1.7–2.3)
PHOSPHATE SERPL-MCNC: 3.4 MG/DL (ref 2.5–4.5)
POTASSIUM SERPL-SCNC: 4 MMOL/L (ref 3.4–5.3)

## 2024-11-14 PROCEDURE — 84132 ASSAY OF SERUM POTASSIUM: CPT | Performed by: STUDENT IN AN ORGANIZED HEALTH CARE EDUCATION/TRAINING PROGRAM

## 2024-11-14 PROCEDURE — 83735 ASSAY OF MAGNESIUM: CPT | Performed by: STUDENT IN AN ORGANIZED HEALTH CARE EDUCATION/TRAINING PROGRAM

## 2024-11-14 PROCEDURE — 250N000013 HC RX MED GY IP 250 OP 250 PS 637: Performed by: STUDENT IN AN ORGANIZED HEALTH CARE EDUCATION/TRAINING PROGRAM

## 2024-11-14 PROCEDURE — 250N000013 HC RX MED GY IP 250 OP 250 PS 637: Performed by: NURSE PRACTITIONER

## 2024-11-14 PROCEDURE — 99239 HOSP IP/OBS DSCHRG MGMT >30: CPT | Performed by: STUDENT IN AN ORGANIZED HEALTH CARE EDUCATION/TRAINING PROGRAM

## 2024-11-14 PROCEDURE — 84100 ASSAY OF PHOSPHORUS: CPT | Performed by: STUDENT IN AN ORGANIZED HEALTH CARE EDUCATION/TRAINING PROGRAM

## 2024-11-14 PROCEDURE — 36415 COLL VENOUS BLD VENIPUNCTURE: CPT | Performed by: STUDENT IN AN ORGANIZED HEALTH CARE EDUCATION/TRAINING PROGRAM

## 2024-11-14 PROCEDURE — 250N000011 HC RX IP 250 OP 636: Performed by: NURSE PRACTITIONER

## 2024-11-14 RX ORDER — PANTOPRAZOLE SODIUM 40 MG/1
40 TABLET, DELAYED RELEASE ORAL
Status: DISCONTINUED | OUTPATIENT
Start: 2024-11-14 | End: 2024-11-14 | Stop reason: HOSPADM

## 2024-11-14 RX ADMIN — HYDROMORPHONE HYDROCHLORIDE 1 MG: 1 INJECTION, SOLUTION INTRAMUSCULAR; INTRAVENOUS; SUBCUTANEOUS at 02:34

## 2024-11-14 RX ADMIN — BICTEGRAVIR SODIUM, EMTRICITABINE, AND TENOFOVIR ALAFENAMIDE FUMARATE 1 TABLET: 50; 200; 25 TABLET ORAL at 09:06

## 2024-11-14 RX ADMIN — PANTOPRAZOLE SODIUM 40 MG: 40 TABLET, DELAYED RELEASE ORAL at 09:10

## 2024-11-14 RX ADMIN — SENNOSIDES AND DOCUSATE SODIUM 2 TABLET: 8.6; 5 TABLET ORAL at 09:07

## 2024-11-14 RX ADMIN — INSULIN ASPART 7 UNITS: 100 INJECTION, SOLUTION INTRAVENOUS; SUBCUTANEOUS at 04:38

## 2024-11-14 ASSESSMENT — ACTIVITIES OF DAILY LIVING (ADL)
ADLS_ACUITY_SCORE: 0
DEPENDENT_IADLS:: INDEPENDENT
ADLS_ACUITY_SCORE: 0

## 2024-11-14 NOTE — PLAN OF CARE
Goal Outcome Evaluation:      Plan of Care Reviewed With: patient    Overall Patient Progress: no changeOverall Patient Progress: no change    Outcome Evaluation: Patient refusing blood sugar checks intermittently. At times agitated with staff. Able to deescalate.      Neuro: A&Ox4. Patient uncooperative at times and refusing cares intermittently.   Cardiac: No tele orders, HR 80's-90's. Slightly hypertensive.   Respiratory: Sating > 92% on RA.  GI/: Adequate urine output.   Diet/appetite: Tolerating Regular diet. Eating well. ACHS BG checks w/ sliding scale and carb coverage. Patient intermittently refusing BG checks.   Activity:  Assist of SB, up to chair and in halls. Patient refuses bed alarm   Pain: At acceptable level on current regimen. No complaints of pain this shift.   Skin: No new deficits noted.  LDA's: L PIV x 2 - SL.     Plan: Continue with POC. Notify primary team with changes.    Preparing patient for discharge as just received discharge orders. Last checked on patient at approximately 1540. Social work gave patient a new pair of shoes to discharge with just before 1600. Went to check on patient and he was no longer in his room. Checked cafeteria and lobby for him and he was not seen. Notified provider. Patient does not have a cell phone to call. Peripheral IV's remained in place. Called for patient overhead and asked him to return to 6B. Patient left prior to receiving proper discharge paper work & education.

## 2024-11-14 NOTE — PROGRESS NOTES
Pt refusing blood sugar before lunch, pt came out to nurses station and took tray after it was delivered.  This writer followed pt into room and explained that we need to get a blood sugar before he starts eating, pt immediately started eating and will not allow this writer to get a blood sugar.

## 2024-11-14 NOTE — DISCHARGE INSTRUCTIONS
Andrew,    You initially came in for a small bowel obstruction. This resolved on 11/13/24 and you were advanced to a full diet.     Your insulin regimen was changed some.     Your long acting insulin: LANTUS should be 25 units daily  Your short acting insulin: NOVOLOG should be 10 units with meals  This is a change from prior dosing.     If your abdominal pain returns you should return to the ER.

## 2024-11-14 NOTE — PLAN OF CARE
Neuro: A&Ox4.   Cardiac: VSS.   Respiratory: Sating >95% on RA w/ spot checks.  GI/: Adequate urine output. No BM. Reports not passing flatus, hypoactive bowel sounds.  Diet/appetite: Tolerating regular diet. Eating well. BG low 300s at midnight and 0400 w/ sliding scale.   Activity:  SBA in room, up to shower.   Pain: At acceptable level on current regimen. Gave PRN dilaudid x2 for 8/10 left abd pain.  Skin: No new deficits noted. Scattered scarring and scratches, dry scab to L knee.   LDA's: 2 LPIV saline locked.     Plan: Continue with POC. Notify primary team with changes.      Goal Outcome Evaluation:      Plan of Care Reviewed With: patient    Overall Patient Progress: improvingOverall Patient Progress: improving    Outcome Evaluation: No reports nausea. Gave PRN dilaudid x2 for 8/10 left abd pain, resolved with medication. Good appetite. BG 200s-300s consistently w/ sliding scale novolog.

## 2024-11-14 NOTE — CONSULTS
Care Management Initial Consult    General Information  Assessment completed with: Patient    Type of CM/SW Visit: Initial Assessment    Primary Care Provider verified and updated as needed: Yes; Pt doesn't have a PCP and is not interested in establishing one.   Readmission within the last 30 days: lack of support, previous discharge plan unsuccessful   Return Category: Medically unsuccessful treatment plan  Reason for Consult: utilization management concerns, other (see comments) (elevated risk score)  Advance Care Planning: Advance Care Planning Reviewed: no concerns identified        Communication Assessment  Patient's communication style: spoken language (English or Bilingual)    Hearing Difficulty or Deaf: no   Wear Glasses or Blind: no    Cognitive  Cognitive/Neuro/Behavioral: .WDL except, mood/behavior  Level of Consciousness: alert  Arousal Level: opens eyes spontaneously  Orientation: other (see comments)  Mood/Behavior: labile, uncooperative  Best Language: 0 - No aphasia  Speech: clear, spontaneous, logical    Living Environment:   People in home: alone     Current living Arrangements: homeless      Able to return to prior arrangements: yes     Family/Social Support:  Care provided by: self  Provides care for: no one     Support system: none           Description of Support System:           Current Resources:   Patient receiving home care services: No  Community Resources: None  Equipment currently used at home: none  Supplies currently used at home: Diabetic Supplies    Employment/Financial:  Employment Status: unemployed        Financial Concerns: unemployed      Does the patient's insurance plan have a 3 day qualifying hospital stay waiver?  No    Lifestyle & Psychosocial Needs:  Social Drivers of Health     Food Insecurity: Low Risk  (11/13/2024)    Food Insecurity     Within the past 12 months, did you worry that your food would run out before you got money to buy more?: No     Within the past 12  months, did the food you bought just not last and you didn t have money to get more?: No   Recent Concern: Food Insecurity - High Risk (10/24/2024)    Food Insecurity     Within the past 12 months, did you worry that your food would run out before you got money to buy more?: Yes     Within the past 12 months, did the food you bought just not last and you didn t have money to get more?: Yes   Depression: Not at risk (8/27/2020)    PHQ-2     PHQ-2 Score: 0   Housing Stability: High Risk (11/13/2024)    Housing Stability     Do you have housing? : No     Are you worried about losing your housing?: Yes   Tobacco Use: High Risk (11/11/2024)    Patient History     Smoking Tobacco Use: Some Days     Smokeless Tobacco Use: Former     Passive Exposure: Not on file   Financial Resource Strain: High Risk (11/13/2024)    Financial Resource Strain     Within the past 12 months, have you or your family members you live with been unable to get utilities (heat, electricity) when it was really needed?: Yes   Alcohol Use: Unknown (8/27/2020)    AUDIT-C     Frequency of Alcohol Consumption: Not on file     Average Number of Drinks: Patient declined     Frequency of Binge Drinking: Not on file   Transportation Needs: High Risk (11/13/2024)    Transportation Needs     Within the past 12 months, has lack of transportation kept you from medical appointments, getting your medicines, non-medical meetings or appointments, work, or from getting things that you need?: Yes   Physical Activity: Not on file   Interpersonal Safety: Low Risk  (11/13/2024)    Interpersonal Safety     Do you feel physically and emotionally safe where you currently live?: Yes     Within the past 12 months, have you been hit, slapped, kicked or otherwise physically hurt by someone?: No     Within the past 12 months, have you been humiliated or emotionally abused in other ways by your partner or ex-partner?: No   Recent Concern: Interpersonal Safety - High Risk  (10/24/2024)    Interpersonal Safety     Do you feel physically and emotionally safe where you currently live?: No     Within the past 12 months, have you been hit, slapped, kicked or otherwise physically hurt by someone?: No     Within the past 12 months, have you been humiliated or emotionally abused in other ways by your partner or ex-partner?: No   Stress: Not on file   Social Connections: Unknown (4/10/2023)    [facility]    Social Connections     Frequency of Communication with Friends and Family: Not on file   Health Literacy: Not on file       Functional Status:  Prior to admission patient needed assistance:   Dependent ADLs:: Independent  Dependent IADLs:: Independent       Mental Health Status:  Mental Health Status: No Current Concerns       Chemical Dependency Status:        Values/Beliefs:  Spiritual, Cultural Beliefs, Christian Practices, Values that affect care: no             Discussed  Partnership in Safe Discharge Planning  document with patient/family: No    Additional Information:  SW was notified by primary provider and bedside nurse that pt is planning to leave [facility] and that pt needs some basic needs resources (housing, food, etc), as he reported no SW in the past has provided any resources whenever pt comes to the hospital. Pt has had several ED and hospitalizations throughout this year.    SW met pt at bedside to complete initial assessment. Introduced self and role. SW verified that pt doesn't have a PCP and he is not interested in establishing one. Pt reports he has no support system and is on his own. Pt is currently homeless, and reports he is unemployed, but receives general assistance and SNAP food services. Pt is completely independent with all I/ADLS.  Pt uses diabetic supplies.     SW provided basic need resources (shelters, housing, food, utilities, etc) to pt. Pt was appreciative and had no  other concerns.    Addendum 3:48 pm: DAYNA was notified by provider that pt would like a pair of shoes, as the shoes he has are too small on his feet. SW met pt at bedside and asked for his size and he reported that he wear 8-9 in men. SW went to the CM office and got shoes size 9.5 and provided it to pt at bedside. Pt was very appreciative.     Next Steps: No further care management intervention anticipated at this time.  Please re-consult if further needs arise.  Care management signing off.       RADHA Min, LGSW  6B   Ph: [phone]  Vocera: Clint Ng or 6B [facility]

## 2024-11-14 NOTE — CONSULTS
Inpatient Diabetes Management Service : Short Note     Patient: Andrew Collier   YOB: 1965    MRN: [id number]     Date of Consult : 11/14/2024   Date of Admission: 11/11/2024     Reason for Consult: diabetes, difficult to control and homeless with difficulty following regimen for diabetes. PTA was not using insulin    Consult Requestor: Camila Rocha DO            History of Present Illness:   Andrew Collier is a 58 year old male admitted on 11/11/2024. He has medical history of multiple small bowel obstructions, methamphetamine use, diabetes type 2, HIV.  He presented to ED with chief complaint of abdominal pain, nausea, vomiting and was found to have small bowel obstruction.      He was given a regular diet on 11/13/2024  Significant hyperglycemia after diet resumed yesterday without carb coverage     ROUTINE IP LABS (Last four results)  BMP  Recent Labs   Lab 11/14/24  1257 11/14/24  1000 11/14/24  0720 11/14/24  0437 11/14/24  0436 11/13/24  0837 11/13/24  0747 11/12/24  0430 11/12/24  0233 11/11/24  2201 11/11/24  1548   NA  --   --   --   --   --   --  137  --  141  --  136   POTASSIUM  --   --   --  4.0  --   --  3.7  3.6  --  4.0  --  4.1   CHLORIDE  --   --   --   --   --   --  103  --  105  --  98   LINDA  --   --   --   --   --   --  9.0  --  9.1  --  10.1   CO2  --   --   --   --   --   --  23  --  23  --  20*   BUN  --   --   --   --   --   --  14.4  --  15.7  --  20.6   CR  --   --   --   --   --   --  0.68  --  0.71  --  0.74   * 203* 250*  --  308*   < > 87   < > 127*   < > 438*    < > = values in this interval not displayed.     CBC  Recent Labs   Lab 11/11/24  1548   WBC 10.2   RBC 4.86   HGB 14.4   HCT 41.8   MCV 86   MCH 29.6   MCHC 34.4   RDW 13.2   *     INRNo lab results found in last 7 days.           Assessment and Recommendations:       Assessment:   Type 2 Diabetes Mellitus, complicated by hyperglycemia,   Abdominal pain, n/v,  small bowel  obstruction   Methamphetamine use disorder  Food and housing insecurities     ++Patient declined speaking to me.  He says he does not want a consult and asked me to please leave.  So this is just a note with recommendations, not a consult.  Sent Mendoza to Dr JACE Rocha DO to let her know Inpatient diabetes service would not be following this patient and that I put in recommendations  and a short note      Plan/Recommendations:    - Increase Lantus 25 units q 24 hrs at 22:00( give lantus 5 units now + lantus 20 units from last night)  When goes out will need to decrease as has food insecurity   - Add Novolog Meal Coverage: 1 units per 5 g CHO, TID AC and PRN with snacks/supplements Addendum:  decrease carb coverage to 1:8 at lunch  - Continue Novolog Correction Scale: high resistance (ISF: 1/25)  TID AC / HS / 0200   - BG monitoring: TID AC, HS, 0200 /     - Hypoglycemia protocol    - Carb counting protocol     Discussion:  Labs on presentation: C02=20, AG=18, ketone=1.63 but ph venous=7.38 so not in DKA and repeat Ketone on 11/12/2024 <0.18.Carb coverage with eating is definitely needed.  RN says that he snacks frequently and this morning was refusing carb coverage.         Kate Quintero PA-C  Inpatient Diabetes Service  [phone]  [place]    To contact Inpatient Diabetes Service:     7 AM - 5 PM: Page the IDS KASSY following the patient that day (see filed or incomplete progress notes/consult notes under Endocrinology)    OR if uncertain of provider assignment: page job code [phone]    5 PM - 7 AM: First call after hours is to primary service.    For urgent after-hours questions, page job code for on call fellow: [phone]       Cannot charge as patient declined a consult/.  I spent a total of  minutes on the date of the encounter doing prep/post-work, chart review, history and exam, documentation and further activities per the note including lab review, multidisciplinary communication, counseling the patient and/or  coordinating care regarding acute hyper/hypoglycemic management, as well as discharge management and planning/communication.  See note for details.

## 2024-11-14 NOTE — PROGRESS NOTES
[facility]    Medicine Progress Note - Hospitalist Service, GOLD TEAM 7    Date of Admission:  11/11/2024    Assessment & Plan      Andrew Collier is a 58 year old male admitted on 11/11/2024. He has medical history of multiple small bowel obstructions, methamphetamine use, diabetes type 2, HIV.  He presented to ED with chief complaint of abdominal pain, nausea, vomiting and was found to have small bowel obstruction.    # Small bowel obstruction  Presented with nausea, vomiting, abdominal pain.  Onset was about 36 hours prior to presentation and worsening on morning of admission.  CT A/P with SBO with 2 transition points and meeting criteria for closed-loop obstruction. He improved with non-surgical management on 11/13. He     -Surgery consulted, appreciate assistance, they have signed off.    - SBO has resolved per gastrograffin challenge, but patient endorses this always happens and when he starts a full diet it returns. Will observe while we optimize diabetes plan.      -Patient can advance diet as tolerated.     -Multimodal pain management    -Electrolyte replacement    # Elevated serum ketones  Ketones checked in ED and were 1.63 mmol/L, initially concerning for possible DKA.  However patient was not acidotic with pH of 7.51.  Suspect an element of starvation ketosis. Repeat check of ketones show this has resolved.     #Diabetes mellitus type 2 with long-term use of insulin    -Home regimen: Lantus, insulin aspart.  Difficult with adherence due to lack of stable housing and stolen medications  -In-hospital regimen:  -Basal Insulin: 20 units Lantus while n.p.o.  -Insulin correction sliding scale: High resistance sliding scale  -Carb coverage: None currently  Lab Results   Component Value Date    A1C 10.5 10/23/2024    A1C 9.7 11/11/2020     # coronavirus (NON-covid-19)  Tested positive on 11/5 for coronavirus (non-covid 19) when he presented to the ER for  "cough    -No treatment.  May discontinue droplet precautions on 11/12    # Chronic conditions  # HIV: Continue PTA Biktarvy in a.m. (may clamp NGT for 30 minutes after administration)  # History of asthma: Albuterol inhaler as needed          Diet: Regular Diet Adult    DVT Prophylaxis: Pneumatic Compression Devices and Ambulate every shift  Potts Catheter: Not present  Lines: None     Cardiac Monitoring: None  Code Status: Full Code      Clinically Significant Risk Factors               # Hypoalbuminemia: Lowest albumin = 3.4 g/dL at 11/13/2024  7:47 AM, will monitor as appropriate               # DMII: A1C = 10.5 % (Ref range: <5.7 %) within past 6 months   # Overweight: Estimated body mass index is 27.46 kg/m  as calculated from the following:    Height as of this encounter: 1.626 m (5' 4\").    Weight as of this encounter: 72.6 kg (160 lb)., PRESENT ON ADMISSION       # Financial/Environmental Concerns:    # Support System: poor social support noted in nursing assessment  # Housing Instability: noted in nursing assessment         Disposition Plan     Medically Ready for Discharge: Anticipated in 2-4 Days    Limits to discharge include:   - diabetes management  - SBO, resolved, but will monitor while advancing diet due to frequency seeking care.          Camila Rocha DO  Hospitalist Service, GOLD TEAM [address] [place] [address]  Securely message with [employer] (more info)  Text page via [facility] Paging/Directory   See signed in provider for up to date coverage information  ______________________________________________________________________    Interval History   He accidentally pulled his NGT out this morning. Abdomen is feeling soft and decompressed.     Physical Exam   Vital Signs:                    Weight: 160 lbs 0 oz    General Appearance: Appears well a  Respiratory: respirations are even and unlabored   GI: Abdomen is soft and non-tender  Skin:scattered excoriations over " extremities     Medical Decision Making       37 MINUTES SPENT BY ME on the date of service doing chart review, history, exam, documentation & further activities per the note.      Data     I have personally reviewed the following data over the past 24 hrs:    N/A  \   N/A   / N/A     137 103 14.4 /  367 (H)   3.6; 3.7 23 0.68 \     ALT: 8 AST: 15 AP: 95 TBILI: 0.6   ALB: 3.4 (L) TOT PROTEIN: 6.5 LIPASE: N/A       Imaging results reviewed over the past 24 hrs:   Recent Results (from the past 24 hours)   XR Gastrografin Challenge    Narrative    Exam: XR Gastrografin Challenge, 11/12/2024 8:26 PM    Indication: Small bowel obstruction    Comparison: CT 11/11/2024    Findings:   Single supine abdominal radiograph was obtained 9 hours after the  administration of Gastrografin. Contrast is visualized within the  entirety of the colon and into the rectum. There is continued  air-filled dilation of the stomach in the midline upper abdomen.      Impression    Impression: Gastrografin is present throughout the colon and rectum,  indicating no evidence of high-grade bowel obstruction.     I have personally reviewed the examination and initial interpretation  and I agree with the findings.    BORIS PAYTON MD         SYSTEM ID:  [id number]

## 2024-11-14 NOTE — PROGRESS NOTES
"Patient has been educated on potential risks of choosing to not have bed alarm on and that the responsibility for patient well-being will belong to the patient. Pt has been informed that admission to hospital is due to need for medical treatment. Education given to the patient on some of the potential risks included but are not limited to:      - patient falling in room and suffering injuries       Patient Response: \"I am 58. I do not fall in the hospital, I am fine on my own.\"     Despite education given to patient by writer patient refuses the bed alarm, and becomes agitated when it is on.  "

## 2024-11-14 NOTE — PROGRESS NOTES
Admission          11/11/2024  2:39 PM  -----------------------------------------------------------  Reason for admission: small bowel obstruction & DKA  Primary team notified of pt arrival.  Admitted from: [facility] ED  Via: bed  Accompanied by: N/A  Belongings: With pt.   Admission Profile: complete  Teaching: orientation to unit and call light- call light within reach, call don't fall, use of console, meal times, when to call for the RN, and enforced importance of safety   Access: 2 L PIV  Telemetry: Not ordered  Ht./Wt.: complete  Code Status verified on armband: yes  2 RN Skin Assessment Completed By: Nena LEIJA RN and Luli CUEVAS RN. Scattered abrasions and scars, dry scab to L knee.   Med Rec completed: yes  Suction/Ambu bag/Flowmeter at bedside: yes  Is patient having diarrhea upon admission- if YES fill out testing algorithm : no    C. Diff Testing Algorithm (MUST be marked YES)   3 or more loose stools in 24 hrs. [] Yes [x] No       Additional symptoms:(At least ONE must be marked yes)   Abdominal pain/discomfort [x] Yes [] No   Fever at least 38C (100.4 F) [] Yes [x] No   Elevated WBC(>11,000) [] Yes [x] No       Exclusion Criteria:  (MUST be marked YES)   Off laxatives for at least 48 hrs. [] Yes [x] No       Pt status:    Temp:  [97.4  F (36.3  C)-98.1  F (36.7  C)] 98.1  F (36.7  C)  Pulse:  [93-95] 95  Resp:  [14-16] 14  BP: (135-141)/(72-77) 141/72  SpO2:  [98 %] 98 %

## 2024-11-14 NOTE — DISCHARGE SUMMARY
"[facility]  Hospitalist Discharge Summary      Date of Admission:  11/11/2024  Date of Discharge:  11/14/2024  Discharging Provider: Camila Rocha DO  Discharge Service: Hospitalist Service, GOLD TEAM 7    Discharge Diagnoses   Small bowel obstruction, resolved  Diabetes, ketoacidosis, resolved       Clinically Significant Risk Factors     # DMII: A1C = 10.5 % (Ref range: <5.7 %) within past 6 months  # Overweight: Estimated body mass index is 27.09 kg/m  as calculated from the following:    Height as of this encounter: 1.626 m (5' 4\").    Weight as of this encounter: 71.6 kg (157 lb 13.6 oz).       Follow-ups Needed After Discharge   Follow-up Appointments       Adult [facility] Follow-up and recommended labs and tests      Follow up with primary care provider, Physician No Ref-Primary, within 7 days to evaluate medication change.  The following labs/tests are recommended: none unless new symptoms.      Appointments on [place] and/or [facility] (with [facility] or [facility] provider or service). Call [phone] if you haven't heard regarding these appointments within 7 days of discharge.              Discharge Disposition   Discharged to home, left against medical advice before his IV was removed. Patient found not in his room   Condition at discharge: Stable    Hospital Course   Andrew Collier is a 58 year old male admitted on 11/11/2024. He has medical history of multiple small bowel obstructions, methamphetamine use, diabetes type 2, HIV.  He presented to ED with chief complaint of abdominal pain, nausea, vomiting and was found to have small bowel obstruction.    # Small bowel obstruction  Presented with nausea, vomiting, abdominal pain.  Onset was about 36 hours prior to presentation and worsening on morning of admission.  CT A/P with SBO with 2 transition points and meeting criteria for closed-loop obstruction. He improved with non-surgical management on " 11/13. He had NGT placed and this decompressed bowel. He had gastrograffin challenge which showed this completed transition to the rectum. He was passing gas and did not have a bowel movement. Because he stated he typically improves but then immediately has abdominal pain after starting diet, he was observed for 24 more hours on full diet. He was passing gas, though denied bowel movement prior to discharge. RN had charged 1 bowel movement on 11/12 during his hospitalization.     #Diabetes mellitus type 2 with long-term use of insulin  # Elevated serum ketones  Ketones checked in ED and were 1.63 mmol/L, initially concerning for possible DKA.  However patient was not acidotic with pH of 7.51.  Suspect an element of starvation ketosis. Repeat check of ketones show this has resolved. His blood sugars were labile while in the hospital. Endocrinology was consulted and recommended some adjustments to his home regimen, but he refused to allow full consultation by the team. On discharge I had discussed with him insulin lantus 25 units in the morning and insulin aspart pen 10 units TID. He did not have his insulin so I was working with pharmacy to get an insurance overload to get him insulin on discharge when he eloped the hospital from his room. This was unable to be completed.     # coronavirus (NON-covid-19)  Tested positive on 11/5 for coronavirus (non-covid 19) when he presented to the ER for cough. He was dicontinued from droplet precautions on 11/12/24. He did not have any trouble with breathing during admission.     # Chronic conditions without changes  # HIV: Continue PTA Biktarvy  # History of asthma: Albuterol inhaler as needed    DISCHARGE COMPLICATION:   While working on getting Andrew new insulin for discharge through the discharge pharmacy, he was found by RN to have eloped. Unfortunately he eloped with Ivs in place in his L forearm. He had a discharge order but left against medical advice before getting  discharge paperwork, medications, or having his Ivs taken out. An overhead page in the hospital was done and he did not return to his room.     Consultations This Hospital Stay   SURGERY GENERAL ADULT IP CONSULT  NURSING TO CONSULT FOR VASCULAR ACCESS CARE IP CONSULT  ENDOCRINE DIABETES ADULT IP CONSULT  CARE MANAGEMENT / SOCIAL WORK IP CONSULT  SOCIAL WORK IP CONSULT    Code Status   Full Code    Time Spent on this Encounter   ICamila DO, personally saw the patient today and spent greater than 30 minutes discharging this patient.       Camila Rocha DO  [facility] [address] [address]  Phone: [phone]  ______________________________________________________________________    Physical Exam   Vital Signs: Temp: 99.1  F (37.3  C) Temp src: Oral BP: 135/83 Pulse: 90   Resp: 22 SpO2: 100 % O2 Device: None (Room air)    Weight: 157 lbs 13.59 oz    PATIENT WOULD NOT ALLOW ME TO EXAMINE HIM TODAY. Asked to listen to lungs, heart and press on abdomen and he declined this.     General Appearance: Appears chronically unwell   Respiratory: normal work of breathing, no acute distress   Psych: very agitated and emotions were labile        Primary Care Physician   Physician No Ref-Primary    Discharge Orders      Reason for your hospital stay    Small bowel obstruction     Activity    Your activity upon discharge: activity as tolerated     Adult [facility] Follow-up and recommended labs and tests    Follow up with primary care provider, Physician No Ref-Primary, within 7 days to evaluate medication change.  The following labs/tests are recommended: none unless new symptoms.      Appointments on [place] and/or [facility] (with [facility] or [facility] provider or service). Call [phone] if you haven't heard regarding these appointments within 7 days of discharge.     Diet    Follow this diet upon discharge: Current Diet:Orders Placed This Encounter      Regular Diet Adult        Significant Results and Procedures   Most Recent 3 CBC's:  Recent Labs   Lab Test 11/11/24  1548 11/05/24  1817 10/26/24  0003 10/23/24  0721   WBC 10.2 10.5  --  5.8   HGB 14.4 12.3*  --  12.1*   MCV 86 85  --  88   * 358 313 327     Most Recent 3 BMP's:  Recent Labs   Lab Test 11/14/24  1257 11/14/24  1000 11/14/24  0720 11/14/24  0437 11/13/24  0837 11/13/24  0747 11/12/24  0430 11/12/24  0233 11/11/24  2201 11/11/24  1548   NA  --   --   --   --   --  137  --  141  --  136   POTASSIUM  --   --   --  4.0  --  3.7  3.6  --  4.0  --  4.1   CHLORIDE  --   --   --   --   --  103  --  105  --  98   CO2  --   --   --   --   --  23  --  23  --  20*   BUN  --   --   --   --   --  14.4  --  15.7  --  20.6   CR  --   --   --   --   --  0.68  --  0.71  --  0.74   ANIONGAP  --   --   --   --   --  11  --  13  --  18*   LINDA  --   --   --   --   --  9.0  --  9.1  --  10.1   * 203* 250*  --    < > 87   < > 127*   < > 438*    < > = values in this interval not displayed.       Discharge Medications   Current Discharge Medication List        CONTINUE these medications which have CHANGED    Details   insulin aspart (NOVOLOG PEN) 100 UNIT/ML pen Inject 10 Units subcutaneously 3 times daily (with meals). Take only when you have access to a meal.  Qty: 15 mL, Refills: 3    Associated Diagnoses: Type 2 diabetes mellitus with other specified complication, with long-term current use of insulin (H)      insulin glargine (LANTUS PEN) 100 UNIT/ML pen Inject 25 Units subcutaneously every morning. REDUCE TO 15 UNITS IF YOU THINK YOU WILL NOT HAVE FOOD THAT DAY.  Qty: 15 mL, Refills: 3    Comments: If Lantus is not covered by insurance, may substitute Basaglar or Semglee or other insulin glargine product per insurance preference at same dose and frequency.    Associated Diagnoses: Type 2 diabetes mellitus with other specified complication, with long-term current use of insulin (H)           CONTINUE these medications which  have NOT CHANGED    Details   albuterol (PROAIR HFA/PROVENTIL HFA/VENTOLIN HFA) 108 (90 Base) MCG/ACT inhaler Inhale 2 puffs into the lungs every 6 hours as needed for shortness of breath, wheezing or cough.  Qty: 18 g, Refills: 0    Comments: Pharmacy may dispense brand covered by insurance (Proair, or proventil or ventolin or generic albuterol inhaler)      bictegravir-emtricitabine-tenofovir (BIKTARVY) -25 MG per tablet Take 1 tablet by mouth daily  Qty: 30 tablet, Refills: 2    Associated Diagnoses: Human immunodeficiency virus I infection (H)      blood glucose (NO BRAND SPECIFIED) lancets standard Use to test blood sugar 1 time daily or as directed.  Qty: 100 Lancet, Refills: 4    Associated Diagnoses: Type 2 diabetes mellitus with hyperglycemia, with long-term current use of insulin (H)      blood glucose (NO BRAND SPECIFIED) test strip Use to test blood sugar 1 time daily or as directed.  Qty: 100 strip, Refills: 4    Associated Diagnoses: Type 2 diabetes mellitus with hyperglycemia, with long-term current use of insulin (H)      blood glucose monitoring (NO BRAND SPECIFIED) meter device kit Use to test blood sugar 3 times daily or as directed.  Qty: 1 kit, Refills: 0    Associated Diagnoses: Type 2 diabetes mellitus with hyperglycemia, with long-term current use of insulin (H)      !! Continuous Glucose Sensor (DEXCOM G6 SENSOR) MISC 1 each every 10 days Change every 10 days.  Qty: 3 each, Refills: 11    Associated Diagnoses: Type 2 diabetes mellitus with hyperglycemia, with long-term current use of insulin (H)      !! Continuous Glucose Sensor (DEXCOM G7 SENSOR) MISC 1 Device every 10 days.  Qty: 9 each, Refills: 3    Associated Diagnoses: Type 2 diabetes mellitus with hyperglycemia, with long-term current use of insulin (H)      Continuous Glucose Transmitter (DEXCOM G6 TRANSMITTER) MISC 1 each every 3 months Change every 3 months.  Qty: 1 each, Refills: 3    Associated Diagnoses: Type 2 diabetes  "mellitus with hyperglycemia, with long-term current use of insulin (H)      insulin pen needle (32G X 4 MM) 32G X 4 MM miscellaneous Use 4-5 pen needles daily or as directed.  Qty: 100 each, Refills: 2    Associated Diagnoses: Type 2 diabetes mellitus with hyperglycemia, with long-term current use of insulin (H)      naproxen (NAPROSYN) 500 MG tablet Take 1 tablet (500 mg) by mouth 2 times daily as needed for moderate pain. Take with meals.  Qty: 30 tablet, Refills: 0      Nutritional Supplements (ENSURE ACTIVE) LIQD Take 414 mLs by mouth daily  Qty: 30 mL, Refills: 11    Associated Diagnoses: Type 2 diabetes mellitus with hyperglycemia, with long-term current use of insulin (H); Human immunodeficiency virus I infection (H)      pantoprazole (PROTONIX) 40 MG EC tablet Take 1 tablet (40 mg) by mouth 2 times daily (before meals).  Qty: 60 tablet, Refills: 0    Associated Diagnoses: Gastroesophageal reflux disease with esophagitis, unspecified whether hemorrhage      polymixin b-trimethoprim (POLYTRIM) 11940-3.1 UNIT/ML-% ophthalmic solution Place 2 drops Into the left eye every 2 hours (while awake).  Qty: 10 mL, Refills: 0       !! - Potential duplicate medications found. Please discuss with provider.        STOP taking these medications       benzonatate (TESSALON) 100 MG capsule Comments:   Reason for Stopping:         sennosides (SENOKOT) 8.6 MG tablet Comments:   Reason for Stopping:             Allergies   Allergies   Allergen Reactions    Fentanyl Blisters     Per pt, after taking this medication had blisters develope    Tylenol [Acetaminophen] Itching    Dulaglutide Rash    Insulin Lispro Rash     Patient reported    Penicillin V Other (See Comments) and Rash     Diffuse maculopapular rash + feels \"high\", per pt.      "

## 2024-11-14 NOTE — PROGRESS NOTES
Activity: Up ad lexi  Neuros:  A&O x4.   Cardiac:  Hypertensive WDL  Respiratory:  WDL on RA. Denies SOB.  GI/:  Voiding spontaneously. +BS, passing flatus.   Diet: Regular with good PO intake  Skin: refused skin assessment  Lines: PIV SL  Incisions/Drains: None  Labs:  at 1550 and 208 at 1700  Pain/nausea:  Denies.  New changes this shift: none  Plan:  Transferred to

## 2024-11-18 ENCOUNTER — HOSPITAL ENCOUNTER (EMERGENCY)
Facility: CLINIC | Age: 59
Discharge: LEFT BEFORE DISCHARGE | End: 2024-11-19
Admitting: FAMILY MEDICINE
Payer: COMMERCIAL

## 2024-11-18 VITALS
SYSTOLIC BLOOD PRESSURE: 151 MMHG | TEMPERATURE: 98.2 F | OXYGEN SATURATION: 98 % | RESPIRATION RATE: 18 BRPM | HEART RATE: 94 BPM | DIASTOLIC BLOOD PRESSURE: 80 MMHG

## 2024-11-18 PROCEDURE — 99281 EMR DPT VST MAYX REQ PHY/QHP: CPT | Performed by: FAMILY MEDICINE

## 2024-11-18 ASSESSMENT — ACTIVITIES OF DAILY LIVING (ADL)
ADLS_ACUITY_SCORE: 0

## 2024-11-18 NOTE — ED TRIAGE NOTES
"Pt ambulatory to ED due to tooth pain around 12pm . Pt states \" I was eating corn and the my tooth broke . Front lower having pain 10/10. Denied bleeding. Pt states he is homeless and is not safe outside to live. Denied fever, chest pain or N/V.   BP (!) 151/80   Pulse 94   Temp 98.2  F (36.8  C) (Oral)   Resp 18        "

## 2024-11-19 NOTE — ED PROVIDER NOTES
Patient LWBS after triage, I had no contact with this patient.     Samuel Hoang PA-C, PA-C  11/18/24 2030

## 2024-11-20 VITALS
RESPIRATION RATE: 18 BRPM | OXYGEN SATURATION: 99 % | HEART RATE: 114 BPM | SYSTOLIC BLOOD PRESSURE: 158 MMHG | DIASTOLIC BLOOD PRESSURE: 95 MMHG | TEMPERATURE: 98.3 F

## 2024-11-20 PROCEDURE — 99284 EMERGENCY DEPT VISIT MOD MDM: CPT | Performed by: EMERGENCY MEDICINE

## 2024-11-20 PROCEDURE — 99285 EMERGENCY DEPT VISIT HI MDM: CPT | Mod: 25 | Performed by: EMERGENCY MEDICINE

## 2024-11-20 PROCEDURE — 96374 THER/PROPH/DIAG INJ IV PUSH: CPT | Mod: 59 | Performed by: EMERGENCY MEDICINE

## 2024-11-20 PROCEDURE — 96361 HYDRATE IV INFUSION ADD-ON: CPT | Performed by: EMERGENCY MEDICINE

## 2024-11-21 ENCOUNTER — HOSPITAL ENCOUNTER (EMERGENCY)
Facility: CLINIC | Age: 59
Discharge: HOME | End: 2024-11-21
Attending: EMERGENCY MEDICINE
Payer: COMMERCIAL

## 2024-11-21 ENCOUNTER — APPOINTMENT (OUTPATIENT)
Dept: CT IMAGING | Facility: CLINIC | Age: 59
End: 2024-11-21
Attending: EMERGENCY MEDICINE
Payer: COMMERCIAL

## 2024-11-21 DIAGNOSIS — K08.89 LOOSENING OF TOOTH: ICD-10-CM

## 2024-11-21 DIAGNOSIS — R73.9 HYPERGLYCEMIA: ICD-10-CM

## 2024-11-21 LAB
ALBUMIN SERPL BCG-MCNC: 4 G/DL (ref 3.5–5.2)
ALBUMIN UR-MCNC: NEGATIVE MG/DL
ALP SERPL-CCNC: 156 U/L (ref 40–150)
ALT SERPL W P-5'-P-CCNC: 11 U/L (ref 0–70)
ANION GAP SERPL CALCULATED.3IONS-SCNC: 12 MMOL/L (ref 7–15)
APPEARANCE UR: CLEAR
AST SERPL W P-5'-P-CCNC: 18 U/L (ref 0–45)
B-OH-BUTYR SERPL-SCNC: <0.18 MMOL/L
BASE EXCESS BLDV CALC-SCNC: 0 MMOL/L (ref -3–3)
BASOPHILS # BLD AUTO: 0.1 10E3/UL (ref 0–0.2)
BASOPHILS NFR BLD AUTO: 1 %
BILIRUB SERPL-MCNC: 0.2 MG/DL
BILIRUB UR QL STRIP: NEGATIVE
BUN SERPL-MCNC: 25.5 MG/DL (ref 8–23)
CALCIUM SERPL-MCNC: 9.9 MG/DL (ref 8.8–10.4)
CHLORIDE SERPL-SCNC: 103 MMOL/L (ref 98–107)
COLOR UR AUTO: ABNORMAL
CREAT SERPL-MCNC: 0.8 MG/DL (ref 0.67–1.17)
EGFRCR SERPLBLD CKD-EPI 2021: >90 ML/MIN/1.73M2
EOSINOPHIL # BLD AUTO: 0.4 10E3/UL (ref 0–0.7)
EOSINOPHIL NFR BLD AUTO: 4 %
ERYTHROCYTE [DISTWIDTH] IN BLOOD BY AUTOMATED COUNT: 12.8 % (ref 10–15)
GLUCOSE BLDC GLUCOMTR-MCNC: 309 MG/DL (ref 70–99)
GLUCOSE SERPL-MCNC: 313 MG/DL (ref 70–99)
GLUCOSE UR STRIP-MCNC: >=1000 MG/DL
HCO3 BLDV-SCNC: 24 MMOL/L (ref 21–28)
HCO3 SERPL-SCNC: 22 MMOL/L (ref 22–29)
HCT VFR BLD AUTO: 35.9 % (ref 40–53)
HGB BLD-MCNC: 11.9 G/DL (ref 13.3–17.7)
HGB UR QL STRIP: ABNORMAL
IMM GRANULOCYTES # BLD: 0.1 10E3/UL
IMM GRANULOCYTES NFR BLD: 1 %
INR PPP: 0.96 (ref 0.85–1.15)
KETONES UR STRIP-MCNC: NEGATIVE MG/DL
LACTATE BLD-SCNC: 0.8 MMOL/L
LEUKOCYTE ESTERASE UR QL STRIP: NEGATIVE
LIPASE SERPL-CCNC: 64 U/L (ref 13–60)
LYMPHOCYTES # BLD AUTO: 3.2 10E3/UL (ref 0.8–5.3)
LYMPHOCYTES NFR BLD AUTO: 34 %
MAGNESIUM SERPL-MCNC: 4.3 MG/DL (ref 1.7–2.3)
MCH RBC QN AUTO: 28.5 PG (ref 26.5–33)
MCHC RBC AUTO-ENTMCNC: 33.1 G/DL (ref 31.5–36.5)
MCV RBC AUTO: 86 FL (ref 78–100)
MONOCYTES # BLD AUTO: 0.6 10E3/UL (ref 0–1.3)
MONOCYTES NFR BLD AUTO: 7 %
MUCOUS THREADS #/AREA URNS LPF: PRESENT /LPF
NEUTROPHILS # BLD AUTO: 5.1 10E3/UL (ref 1.6–8.3)
NEUTROPHILS NFR BLD AUTO: 54 %
NITRATE UR QL: NEGATIVE
NRBC # BLD AUTO: 0 10E3/UL
NRBC BLD AUTO-RTO: 0 /100
PCO2 BLDV: 39 MM HG (ref 40–50)
PH BLDV: 7.4 [PH] (ref 7.32–7.43)
PH UR STRIP: 6 [PH] (ref 5–7)
PHOSPHATE SERPL-MCNC: 2.8 MG/DL (ref 2.5–4.5)
PLATELET # BLD AUTO: 473 10E3/UL (ref 150–450)
PO2 BLDV: 48 MM HG (ref 25–47)
POTASSIUM SERPL-SCNC: 4.2 MMOL/L (ref 3.4–5.3)
PROT SERPL-MCNC: 7.6 G/DL (ref 6.4–8.3)
RBC # BLD AUTO: 4.18 10E6/UL (ref 4.4–5.9)
RBC URINE: 1 /HPF
SAO2 % BLDV: 83 % (ref 70–75)
SODIUM SERPL-SCNC: 137 MMOL/L (ref 135–145)
SP GR UR STRIP: 1.03 (ref 1–1.03)
TROPONIN T SERPL HS-MCNC: 13 NG/L
TROPONIN T SERPL HS-MCNC: 13 NG/L
UROBILINOGEN UR STRIP-MCNC: NORMAL MG/DL
WBC # BLD AUTO: 9.5 10E3/UL (ref 4–11)
WBC URINE: <1 /HPF

## 2024-11-21 PROCEDURE — 83735 ASSAY OF MAGNESIUM: CPT | Performed by: EMERGENCY MEDICINE

## 2024-11-21 PROCEDURE — 82962 GLUCOSE BLOOD TEST: CPT

## 2024-11-21 PROCEDURE — 82010 KETONE BODYS QUAN: CPT | Performed by: EMERGENCY MEDICINE

## 2024-11-21 PROCEDURE — 250N000011 HC RX IP 250 OP 636: Performed by: EMERGENCY MEDICINE

## 2024-11-21 PROCEDURE — 85610 PROTHROMBIN TIME: CPT | Performed by: EMERGENCY MEDICINE

## 2024-11-21 PROCEDURE — 74177 CT ABD & PELVIS W/CONTRAST: CPT | Mod: 26 | Performed by: RADIOLOGY

## 2024-11-21 PROCEDURE — 70486 CT MAXILLOFACIAL W/O DYE: CPT | Mod: XS

## 2024-11-21 PROCEDURE — 74177 CT ABD & PELVIS W/CONTRAST: CPT

## 2024-11-21 PROCEDURE — 84100 ASSAY OF PHOSPHORUS: CPT | Performed by: EMERGENCY MEDICINE

## 2024-11-21 PROCEDURE — 70450 CT HEAD/BRAIN W/O DYE: CPT

## 2024-11-21 PROCEDURE — 36415 COLL VENOUS BLD VENIPUNCTURE: CPT | Performed by: EMERGENCY MEDICINE

## 2024-11-21 PROCEDURE — 85018 HEMOGLOBIN: CPT | Performed by: EMERGENCY MEDICINE

## 2024-11-21 PROCEDURE — 81003 URINALYSIS AUTO W/O SCOPE: CPT | Performed by: EMERGENCY MEDICINE

## 2024-11-21 PROCEDURE — 70486 CT MAXILLOFACIAL W/O DYE: CPT

## 2024-11-21 PROCEDURE — 84155 ASSAY OF PROTEIN SERUM: CPT | Performed by: EMERGENCY MEDICINE

## 2024-11-21 PROCEDURE — 70450 CT HEAD/BRAIN W/O DYE: CPT | Mod: 26 | Performed by: RADIOLOGY

## 2024-11-21 PROCEDURE — 83690 ASSAY OF LIPASE: CPT | Performed by: EMERGENCY MEDICINE

## 2024-11-21 PROCEDURE — 82803 BLOOD GASES ANY COMBINATION: CPT

## 2024-11-21 PROCEDURE — 85004 AUTOMATED DIFF WBC COUNT: CPT | Performed by: EMERGENCY MEDICINE

## 2024-11-21 PROCEDURE — 80053 COMPREHEN METABOLIC PANEL: CPT | Performed by: EMERGENCY MEDICINE

## 2024-11-21 PROCEDURE — 85048 AUTOMATED LEUKOCYTE COUNT: CPT | Performed by: EMERGENCY MEDICINE

## 2024-11-21 PROCEDURE — 70486 CT MAXILLOFACIAL W/O DYE: CPT | Mod: 26 | Performed by: RADIOLOGY

## 2024-11-21 PROCEDURE — 84484 ASSAY OF TROPONIN QUANT: CPT | Performed by: EMERGENCY MEDICINE

## 2024-11-21 PROCEDURE — 258N000003 HC RX IP 258 OP 636: Performed by: EMERGENCY MEDICINE

## 2024-11-21 RX ORDER — IOPAMIDOL 755 MG/ML
100 INJECTION, SOLUTION INTRAVASCULAR ONCE
Status: COMPLETED | OUTPATIENT
Start: 2024-11-21 | End: 2024-11-21

## 2024-11-21 RX ADMIN — IOPAMIDOL 100 ML: 755 INJECTION, SOLUTION INTRAVENOUS at 03:47

## 2024-11-21 RX ADMIN — SODIUM CHLORIDE 500 ML: 9 INJECTION, SOLUTION INTRAVENOUS at 02:01

## 2024-11-21 RX ADMIN — FAMOTIDINE 40 MG: 10 INJECTION, SOLUTION INTRAVENOUS at 02:04

## 2024-11-21 ASSESSMENT — ACTIVITIES OF DAILY LIVING (ADL)
ADLS_ACUITY_SCORE: 0

## 2024-11-21 NOTE — DISCHARGE INSTRUCTIONS
Your workup in the emergency department including multiple CT scans and labs is overall reassuring.  Please make an appointment to follow up with [facility] (phone: [phone]) as soon as possible.  You should follow-up with your primary care provider as well for management of your diabetes.

## 2024-11-21 NOTE — ED TRIAGE NOTES
Pt having abdominal pain x few hours, and a broken tooth     Triage Assessment (Adult)       Row Name 11/20/24 3416          Triage Assessment    Airway WDL WDL        Respiratory WDL    Respiratory WDL WDL        Skin Circulation/Temperature WDL    Skin Circulation/Temperature WDL WDL        Cardiac WDL    Cardiac WDL X;rhythm     Pulse Rate & Regularity tachycardic        Peripheral/Neurovascular WDL    Peripheral Neurovascular WDL WDL        Cognitive/Neuro/Behavioral WDL    Cognitive/Neuro/Behavioral WDL WDL
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
## 2024-11-21 NOTE — ED PROVIDER NOTES
not seem consistent with cholecystitis/biliary colic.  There is some mild bladder thickening but urinalysis does not suggest acute UTI.  Small patchy opacities at the bilateral lung bases are likely inflammatory/atelectasis as there is no clinical suspicion for pneumonia.  CT head/facial bones negative for acute traumatic injury or dental trauma.    On reassessment, patient's symptoms did improve with IV Pepcid.  Exact cause of his symptoms unclear but could be related to GERD.  Regardless his workup in the ED did not reveal any emergent pathology.  Discharged in good condition with plan for PCP and dental follow-up     Kofi Mauro,   11/22/24 0639

## 2024-11-30 ENCOUNTER — APPOINTMENT (OUTPATIENT)
Dept: GENERAL RADIOLOGY | Facility: CLINIC | Age: 59
End: 2024-11-30
Attending: FAMILY MEDICINE
Payer: COMMERCIAL

## 2024-11-30 ENCOUNTER — HOSPITAL ENCOUNTER (EMERGENCY)
Facility: CLINIC | Age: 59
Discharge: HOME | End: 2024-11-30
Attending: FAMILY MEDICINE | Admitting: FAMILY MEDICINE
Payer: COMMERCIAL

## 2024-11-30 VITALS
SYSTOLIC BLOOD PRESSURE: 140 MMHG | DIASTOLIC BLOOD PRESSURE: 93 MMHG | TEMPERATURE: 98.5 F | RESPIRATION RATE: 16 BRPM | HEART RATE: 91 BPM | OXYGEN SATURATION: 100 %

## 2024-11-30 DIAGNOSIS — R05.1 ACUTE COUGH: ICD-10-CM

## 2024-11-30 DIAGNOSIS — J01.00 ACUTE NON-RECURRENT MAXILLARY SINUSITIS: ICD-10-CM

## 2024-11-30 PROCEDURE — 87637 SARSCOV2&INF A&B&RSV AMP PRB: CPT | Performed by: FAMILY MEDICINE

## 2024-11-30 PROCEDURE — 99284 EMERGENCY DEPT VISIT MOD MDM: CPT | Mod: 25 | Performed by: FAMILY MEDICINE

## 2024-11-30 PROCEDURE — 99284 EMERGENCY DEPT VISIT MOD MDM: CPT | Performed by: FAMILY MEDICINE

## 2024-11-30 PROCEDURE — 71046 X-RAY EXAM CHEST 2 VIEWS: CPT | Mod: 26 | Performed by: RADIOLOGY

## 2024-11-30 PROCEDURE — 71046 X-RAY EXAM CHEST 2 VIEWS: CPT

## 2024-11-30 RX ORDER — AZITHROMYCIN 250 MG/1
TABLET, FILM COATED ORAL
Qty: 6 TABLET | Refills: 0 | Status: SHIPPED | OUTPATIENT
Start: 2024-11-30

## 2024-11-30 RX ORDER — ALBUTEROL SULFATE 90 UG/1
2 INHALANT RESPIRATORY (INHALATION) EVERY 6 HOURS PRN
Qty: 18 G | Refills: 0 | Status: SHIPPED | OUTPATIENT
Start: 2024-11-30

## 2024-11-30 ASSESSMENT — ACTIVITIES OF DAILY LIVING (ADL)
ADLS_ACUITY_SCORE: 56
ADLS_ACUITY_SCORE: 56

## 2024-11-30 NOTE — ED TRIAGE NOTES
Pt arrives c/o cough for 4 days without fevers. Pt states he was prescribed an inhaler last time he was seen but has since lost it.      Triage Assessment (Adult)       Row Name 11/30/24 1001          Triage Assessment    Airway WDL WDL        Respiratory WDL    Respiratory WDL X;cough     Cough Frequency infrequent        Skin Circulation/Temperature WDL    Skin Circulation/Temperature WDL WDL        Cardiac WDL    Cardiac WDL X     Cardiac Rhythm ST        Peripheral/Neurovascular WDL    Peripheral Neurovascular WDL WDL        Cognitive/Neuro/Behavioral WDL    Cognitive/Neuro/Behavioral WDL WDL

## 2024-11-30 NOTE — DISCHARGE INSTRUCTIONS
Home.  Your covid and xray look good.  Take the zpak for sinus infection that may be triggering cough.  OTC cough medications.  Honey and fluids.  Use albuterol inhaler for cough also.  Return if concerns.  Follow up with MD for recheck.    Please make an appointment to follow up with Your Primary Care Provider, [facility] (phone: [phone]), Primary Care - [facility] (phone: [phone]), Primary Care - [facility] (phone: [phone]), and Primary Care - [facility] (phone: [phone]) as soon as possible.      Results for orders placed or performed during the hospital encounter of 11/30/24   XR Chest 2 Views     Status: None    Narrative    Exam: XR CHEST 2 VIEWS, 11/30/2024 10:29 AM    Comparison: 11/5/2024    History: cough    Findings:  PA and lateral views of the upright chest. Trachea is midline.  Cardiomediastinal silhouette is within normal limits. No focal  airspace opacity. No pneumothorax or pleural effusion. The visualized  upper abdomen is unremarkable. No acute osseous abnormalities.      Impression    Impression: Negative.    I have personally reviewed the examination and initial interpretation  and I agree with the findings.    MATIAS LIZ MD         SYSTEM ID:  [id number]   Influenza A/B, RSV and SARS-CoV2 PCR (COVID-19) Nasopharyngeal     Status: Normal    Specimen: Nasopharyngeal; Swab   Result Value Ref Range    Influenza A PCR Negative Negative    Influenza B PCR Negative Negative    RSV PCR Negative Negative    SARS CoV2 PCR Negative Negative    Narrative    Testing was performed using the Xpert Xpress CoV2/Flu/RSV Assay on the [employer] GeneXpert Instrument. This test should be ordered for the detection of SARS-CoV2, influenza, and RSV viruses in individuals with signs and symptoms of respiratory tract infection. This test is for in vitro diagnostic use under the [place] FDA for laboratories certified under CLIA to perform high or moderate complexity  testing. This test has been [place] FDA cleared. A negative result does not rule out the presence of PCR inhibitors in the specimen or target RNA in concentration below the limit of detection for the assay. If only one viral target is positive but coinfection with multiple targets is suspected, the sample should be re-tested with another FDA cleared, approved, or authorized test, if coninfection would change clinical management. This test was validated by the [facility] [place]. These laboratories are certified under the Clinical Laboratory Improvement Amendments of 1988 (CLIA-88) as qualified to perfom high complexity laboratory testing.

## 2024-11-30 NOTE — ED PROVIDER NOTES
ED Provider Note  [facility]      History     Chief Complaint   Patient presents with    Cough     The history is provided by the patient and medical records.     Andrew Collier is a 59 year old male with history of HIV, DMII, methamphetamine abuse who presents to the ED for 4 day history of cough. He went and got a Covid test yesterday which was negative, but he continues to have persistent dry cough with sinus drainage and some subjective fever and chills. No chest pain or shortness of breath, sore throat, nausea, or vomiting. No history of sinus infections or surgeries.    Past Medical History  Past Medical History:   Diagnosis Date    Acute appendicitis with localized peritonitis 1/31/2018    AIDS (H)     Allergic rhinitis due to other allergen     DNS    Anal dysplasia     Chronic abdominal pain     CNS toxoplasmosis (H)     COVID-19 1/11/2022    Diabetes type 2, controlled (H)     GERD (gastroesophageal reflux disease)     Herpes zoster 9/23/2016    History of seizure     History of substance abuse (H)     HIV (human immunodeficiency virus infection) (H)     HLD (hyperlipidemia)     Lung nodules     Periungual wart     Pneumonia 1/6/2019    PTSD (post-traumatic stress disorder)     Sleep apnea     doesn't use CPAP     Past Surgical History:   Procedure Laterality Date    ANOSCOPY N/A 9/9/2020    Procedure: Exam Under Anesthesia, ANOSCOPY, fulgeration of rectal fissures with Rectal Biopsies;  Surgeon: Thanh Lundberg MD;  Location: [facility] OR    COLONOSCOPY Left 1/22/2016    Procedure: COMBINED COLONOSCOPY, SINGLE OR MULTIPLE BIOPSY/POLYPECTOMY BY BIOPSY;  Surgeon: Clark Saini MD;  Location: [facility] GI    ESOPHAGOSCOPY, GASTROSCOPY, DUODENOSCOPY (EGD), COMBINED N/A 6/6/2022    Procedure: ESOPHAGOGASTRODUODENOSCOPY, WITH BIOPSY;  Surgeon: Kecia Benítez MD;  Location: [facility]    ESOPHAGOSCOPY, GASTROSCOPY, DUODENOSCOPY (EGD), COMBINED N/A 10/10/2024    Procedure:  Esophagoscopy, gastroscopy, duodenoscopy (EGD), combined;  Surgeon: Nico Bhatti DO;  Location: [facility]    HC EXPLORE UNDESC TESTIS,INGUIN/SCROTAL      LAPAROSCOPIC APPENDECTOMY N/A 1/31/2018    Procedure: LAPAROSCOPIC APPENDECTOMY;  LAPAROSCOPIC APPENDECTOMY;  Surgeon: Dawn Holt MD;  Location: [facility]    LAPAROSCOPY DIAGNOSTIC (GENERAL) N/A 7/26/2016    Procedure: LAPAROSCOPY DIAGNOSTIC (GENERAL);  Surgeon: Susannah Arriaga MD;  Location: [facility]    LAPAROSCOPY DIAGNOSTIC (GENERAL) N/A 4/16/2018    Procedure: LAPAROSCOPY DIAGNOSTIC (GENERAL);  Diagnostic laparoscopy and lysis of adhesions;  Surgeon: Prince Dowling MD;  Location: [facility] OR    OPTICAL TRACKING SYSTEM CRANIOTOMY, EXCISE TUMOR, COMBINED Left 4/10/2015    Procedure: COMBINED OPTICAL TRACKING SYSTEM CRANIOTOMY, EXCISE TUMOR;  Surgeon: Mirlande Colmenares MD;  Location: [facility] OR    REPAIR GAMEKEEPER'S THUMB Right 12/2/2016    Procedure: REPAIR LIGAMENT ULNAR COLLATERAL THUMB (GAMEKEEPER'S);  Surgeon: Evin Zamorano MD;  Location: [facility] OR    ZZC NONSPECIFIC PROCEDURE      right forearm fracture     albuterol (PROAIR HFA/PROVENTIL HFA/VENTOLIN HFA) 108 (90 Base) MCG/ACT inhaler  azithromycin (ZITHROMAX Z-OLIVERIO) 250 MG tablet  albuterol (PROAIR HFA/PROVENTIL HFA/VENTOLIN HFA) 108 (90 Base) MCG/ACT inhaler  bictegravir-emtricitabine-tenofovir (BIKTARVY) -25 MG per tablet  blood glucose (NO BRAND SPECIFIED) lancets standard  blood glucose (NO BRAND SPECIFIED) test strip  blood glucose monitoring (NO BRAND SPECIFIED) meter device kit  Continuous Glucose Sensor (DEXCOM G6 SENSOR) MISC  Continuous Glucose Sensor (DEXCOM G7 SENSOR) MISC  Continuous Glucose Transmitter (DEXCOM G6 TRANSMITTER) MISC  insulin aspart (NOVOLOG PEN) 100 UNIT/ML pen  insulin glargine (LANTUS PEN) 100 UNIT/ML pen  insulin pen needle (32G X 4 MM) 32G X 4 MM miscellaneous  naproxen (NAPROSYN) 500 MG tablet  Nutritional Supplements (ENSURE ACTIVE)  "LIQD  pantoprazole (PROTONIX) 40 MG EC tablet  polymixin b-trimethoprim (POLYTRIM) 01730-7.1 UNIT/ML-% ophthalmic solution      Allergies   Allergen Reactions    Fentanyl Blisters     Per pt, after taking this medication had blisters develope    Tylenol [Acetaminophen] Itching    Dulaglutide Rash    Insulin Lispro Rash     Patient reported    Penicillin V Other (See Comments) and Rash     Diffuse maculopapular rash + feels \"high\", per pt.      Family History  Family History   Problem Relation Age of Onset    Diabetes Brother     Diabetes Father     Alzheimer Disease Father     Unknown/Adopted Mother     Diabetes Paternal Grandfather     Cancer No family hx of         no skin cancer    Skin Cancer No family hx of         no famiy hx of skin cancer    Glaucoma No family hx of     Macular Degeneration No family hx of      Social History   Social History     Tobacco Use    Smoking status: Some Days     Current packs/day: 0.25     Average packs/day: 0.3 packs/day for 40.0 years (10.0 ttl pk-yrs)     Types: Cigarettes    Smokeless tobacco: Former   Substance Use Topics    Alcohol use: No     Alcohol/week: 0.0 standard drinks of alcohol     Comment: Last etoh in 2007    Drug use: Not Currently     Types: Marijuana     Comment: chart indicated meth use but pt denies; states he does not use drugs      A complete review of systems was performed with pertinent positives and negatives noted in the HPI, and all other systems negative.    Physical Exam   BP: (!) 164/81  Pulse: 100  Temp: 97.8  F (36.6  C)  Resp: 16  SpO2: 98 %  Physical Exam  Vitals and nursing note reviewed.   Constitutional:       General: He is in acute distress.      Appearance: He is well-developed. He is not toxic-appearing or diaphoretic.   HENT:      Head: Normocephalic and atraumatic.      Nose: Congestion and rhinorrhea present.      Mouth/Throat:      Mouth: Mucous membranes are moist.      Pharynx: Oropharynx is clear. No oropharyngeal exudate or " posterior oropharyngeal erythema.   Eyes:      General: No scleral icterus.     Extraocular Movements: Extraocular movements intact.      Conjunctiva/sclera: Conjunctivae normal.      Pupils: Pupils are equal, round, and reactive to light.   Cardiovascular:      Rate and Rhythm: Normal rate and regular rhythm.   Pulmonary:      Effort: Pulmonary effort is normal. No respiratory distress.      Breath sounds: No stridor. No wheezing.   Abdominal:      General: Abdomen is flat. There is no distension.      Tenderness: There is no abdominal tenderness.   Musculoskeletal:         General: No tenderness or deformity. Normal range of motion.      Cervical back: Normal range of motion and neck supple. No rigidity.   Lymphadenopathy:      Cervical: No cervical adenopathy.   Skin:     General: Skin is warm and dry.      Capillary Refill: Capillary refill takes less than 2 seconds.      Coloration: Skin is not jaundiced or pale.      Findings: No rash.   Neurological:      General: No focal deficit present.      Mental Status: He is alert and oriented to person, place, and time.      Cranial Nerves: No cranial nerve deficit.      Sensory: No sensory deficit.      Coordination: Coordination normal.   Psychiatric:      Comments: Mildly flattened otherwise appropriate.           ED Course, Procedures, & Data        Records reviewed in [facility] as noted refer to HPI.    In the ER COVID influenza RSV were negative.  Chest x-ray done also r Z-Mulugeta as patient has allergies to penicillins.  Will add an albuterol inhaler follow-up with MD for recheck and should return to concerns patient agrees with plan comfortable discharge.  Eviewed by myself negative for any acute infiltrates etc.    Reevaluated patient in the ER patient this point was requesting inhaler also for the cough this point we will treat though for sinusitis with Z-Mulugeta along with albuterol inhaler following up with MD return to concerns patient agrees with  plan        Procedures                 Results for orders placed or performed during the hospital encounter of 11/30/24   XR Chest 2 Views     Status: None    Narrative    Exam: XR CHEST 2 VIEWS, 11/30/2024 10:29 AM    Comparison: 11/5/2024    History: cough    Findings:  PA and lateral views of the upright chest. Trachea is midline.  Cardiomediastinal silhouette is within normal limits. No focal  airspace opacity. No pneumothorax or pleural effusion. The visualized  upper abdomen is unremarkable. No acute osseous abnormalities.      Impression    Impression: Negative.    I have personally reviewed the examination and initial interpretation  and I agree with the findings.    MATIAS LIZ MD         SYSTEM ID:  [id number]   Influenza A/B, RSV and SARS-CoV2 PCR (COVID-19) Nasopharyngeal     Status: Normal    Specimen: Nasopharyngeal; Swab   Result Value Ref Range    Influenza A PCR Negative Negative    Influenza B PCR Negative Negative    RSV PCR Negative Negative    SARS CoV2 PCR Negative Negative    Narrative    Testing was performed using the Xpert Xpress CoV2/Flu/RSV Assay on the [employer] GeneXpert Instrument. This test should be ordered for the detection of SARS-CoV2, influenza, and RSV viruses in individuals with signs and symptoms of respiratory tract infection. This test is for in vitro diagnostic use under the [place] FDA for laboratories certified under CLIA to perform high or moderate complexity testing. This test has been [place] FDA cleared. A negative result does not rule out the presence of PCR inhibitors in the specimen or target RNA in concentration below the limit of detection for the assay. If only one viral target is positive but coinfection with multiple targets is suspected, the sample should be re-tested with another FDA cleared, approved, or authorized test, if coninfection would change clinical management. This test was validated by the [facility]. These laboratories are  certified under the Clinical Laboratory Improvement Amendments of 1988 (CLIA-88) as qualified to perfom high complexity laboratory testing.     Medications - No data to display  Labs Ordered and Resulted from Time of ED Arrival to Time of ED Departure   INFLUENZA A/B, RSV AND SARS-COV2 PCR - Normal       Result Value    Influenza A PCR Negative      Influenza B PCR Negative      RSV PCR Negative      SARS CoV2 PCR Negative       XR Chest 2 Views   Final Result   Impression: Negative.      I have personally reviewed the examination and initial interpretation   and I agree with the findings.      MATIAS LIZ MD            SYSTEM ID:  [id number]             Critical care was not performed.     Medical Decision Making  The patient's presentation was of low complexity (an acute and uncomplicated illness or injury).  The patient's evaluation involved:  review of external note(s) from 3+ sources (see separate area of note for details)  review of 3+ test result(s) ordered prior to this encounter (see separate area of note for details)  ordering and/or review of 3+ test(s) in this encounter (see separate area of note for details)    The patient's management necessitated moderate risk (prescription drug management including medications given in the ED).    Assessment & Plan   59-year-old male HIV positive history presents with several days of sinus drainage cough also noted more likely triggered from this.  Chest x-ray negative COVID influenza RSV are negative.  Will treat though for sinusitis symptoms then may be 5 to 7 days and will also use albuterol inhaler following up with MD for recheck and should return if any concerns patient agrees with plan.  Stable.         I have reviewed the nursing notes. I have reviewed the findings, diagnosis, plan and need for follow up with the patient.    Discharge Medication List as of 11/30/2024 12:22 PM        START taking these medications    Details   !! albuterol (PROAIR  HFA/PROVENTIL HFA/VENTOLIN HFA) 108 (90 Base) MCG/ACT inhaler Inhale 2 puffs into the lungs every 6 hours as needed for shortness of breath, wheezing or cough., Disp-18 g, R-0, [facility]armacy may dispense brand covered by insurance (Proair, or proventil or ventolin or generic albuterol inhaler)      azithromycin (ZITHROMAX Z-OLIVERIO) 250 MG tablet Take as directed for sinus infection and cough, Disp-6 tablet, R-0, Local Print       !! - Potential duplicate medications found. Please discuss with provider.          Final diagnoses:   Acute non-recurrent maxillary sinusitis   Acute cough   IKatalina, am serving as a trained medical scribe to document services personally performed by Sachin Rader MD, based on the provider's statements to me.     Sachin KELLY MD, was physically present and have reviewed and verified the accuracy of this note documented by Katalina Hebert.      Sachin Rader MD  [facility] EMERGENCY DEPARTMENT  11/30/2024    This note was created at least in part by the use of dragon voice dictation system. Inadvertent typographical errors may still exist.  Sachin Rader MD.  Patient evaluated in the emergency department during the COVID-19 pandemic period. Careful attention to patients safety was addressed throughout the evaluation. Evaluation and treatment management was initiated with disposition made efficiently and appropriate as possible to minimize any risk of potential exposure to patient during this evaluation.            Sachin Rader MD  11/30/24 [id number]

## 2024-12-11 ENCOUNTER — TELEPHONE (OUTPATIENT)
Dept: OPHTHALMOLOGY | Facility: CLINIC | Age: 59
End: 2024-12-11
Payer: COMMERCIAL

## 2024-12-12 ENCOUNTER — PATIENT OUTREACH (OUTPATIENT)
Dept: INFECTIOUS DISEASES | Facility: CLINIC | Age: 59
End: 2024-12-12

## 2024-12-12 ENCOUNTER — LAB (OUTPATIENT)
Dept: LAB | Facility: CLINIC | Age: 59
End: 2024-12-12
Attending: INTERNAL MEDICINE
Payer: COMMERCIAL

## 2024-12-12 DIAGNOSIS — Z11.3 SCREEN FOR STD (SEXUALLY TRANSMITTED DISEASE): ICD-10-CM

## 2024-12-12 DIAGNOSIS — Z21 HUMAN IMMUNODEFICIENCY VIRUS I INFECTION (H): ICD-10-CM

## 2024-12-12 LAB
ANION GAP SERPL CALCULATED.3IONS-SCNC: 13 MMOL/L (ref 7–15)
BUN SERPL-MCNC: 20.8 MG/DL (ref 8–23)
C TRACH DNA SPEC QL NAA+PROBE: NEGATIVE
CALCIUM SERPL-MCNC: 9.5 MG/DL (ref 8.8–10.4)
CHLORIDE SERPL-SCNC: 97 MMOL/L (ref 98–107)
CREAT SERPL-MCNC: 0.85 MG/DL (ref 0.67–1.17)
EGFRCR SERPLBLD CKD-EPI 2021: >90 ML/MIN/1.73M2
GLUCOSE SERPL-MCNC: 491 MG/DL (ref 70–99)
HCO3 SERPL-SCNC: 21 MMOL/L (ref 22–29)
N GONORRHOEA DNA SPEC QL NAA+PROBE: NEGATIVE
POTASSIUM SERPL-SCNC: 4.3 MMOL/L (ref 3.4–5.3)
SODIUM SERPL-SCNC: 131 MMOL/L (ref 135–145)
T PALLIDUM AB SER QL: NONREACTIVE

## 2024-12-12 PROCEDURE — 99000 SPECIMEN HANDLING OFFICE-LAB: CPT | Performed by: PATHOLOGY

## 2024-12-12 PROCEDURE — 36415 COLL VENOUS BLD VENIPUNCTURE: CPT | Performed by: PATHOLOGY

## 2024-12-12 PROCEDURE — 80048 BASIC METABOLIC PNL TOTAL CA: CPT | Performed by: INTERNAL MEDICINE

## 2024-12-12 PROCEDURE — 86780 TREPONEMA PALLIDUM: CPT | Performed by: INTERNAL MEDICINE

## 2024-12-12 PROCEDURE — 87491 CHLMYD TRACH DNA AMP PROBE: CPT | Performed by: INTERNAL MEDICINE

## 2024-12-12 PROCEDURE — 87591 N.GONORRHOEAE DNA AMP PROB: CPT | Performed by: INTERNAL MEDICINE

## 2024-12-12 PROCEDURE — 82374 ASSAY BLOOD CARBON DIOXIDE: CPT | Performed by: INTERNAL MEDICINE

## 2024-12-12 PROCEDURE — 87536 HIV-1 QUANT&REVRSE TRNSCRPJ: CPT | Performed by: INTERNAL MEDICINE

## 2024-12-12 NOTE — TELEPHONE ENCOUNTER
"Social Work - Intervention  [facility]    Data/Intervention: 2024    Patient Name: Andrew Collier Goes By: Andrew LE/Age: 1965 (59 year old)     Visit Type: In person  Referral Source: N/A  Reason for Referral: Check In     Collaborated With:    -Patient  -Provider     Psychosocial Information/Concerns:  Patient has an appointment with ID clinic today at 9 am. Writer met with Patient to check in and assess for needs.      Intervention/Education/Resources Provided:  Writer and Patient met prior to his appointment with his Provider. Patient reports that he is currently in treatment at [facility] for six weeks and plans to discharge to sober living. Patient reports he is three weeks sober from meth which is shared he is proud of. Patient reports he \"almost \" and is glad he is still here, Writer provided support and encouragement. Patient and Writer reviewed current supports and potential needs. Patient reports he is working with a nurse at the treatment program to get his medical issues in order, noting that he has several upcoming appointments. Patient reports he is doing better taking his medications now that he is in treatment. Patient still does not have a phone, but checks his email. Patient's email is [email or web address]. Patient will update clinic when he obtains a phone. Patient denied current needs outside of getting \"cheaters\" for his eyes until his eye appointment. Clinic does not have those in stock, Patient understood. Patient is utilizing medical cabs to attend appointments. Patient has [facility] resources like general assistance and food assistance, noting he may have to go to the [facility] to get a new card to access those benefits.      Assessment/Plan:  Patient plans to meet with the Provider at 9 am, has an eye appointment , and is establishing with a PCP on 25. Patient will  his medication in the pharmacy downstairs after today's " appointment, labs were completed this morning already. Patient was provided with Writer's contact information and will reach out to Writer and the Clinic if he needs assistance.     RADHA Rutledge, [facility]  Infectious Disease

## 2024-12-14 LAB
HIV1 RNA # PLAS NAA DL=20: 153 COPIES/ML
HIV1 RNA SERPL NAA+PROBE-LOG#: 2.2 {LOG_COPIES}/ML

## 2024-12-17 ENCOUNTER — TELEPHONE (OUTPATIENT)
Dept: PHARMACY | Facility: CLINIC | Age: 59
End: 2024-12-17
Payer: COMMERCIAL

## 2024-12-17 ENCOUNTER — APPOINTMENT (OUTPATIENT)
Dept: GENERAL RADIOLOGY | Facility: CLINIC | Age: 59
End: 2024-12-17
Attending: STUDENT IN AN ORGANIZED HEALTH CARE EDUCATION/TRAINING PROGRAM
Payer: COMMERCIAL

## 2024-12-17 ENCOUNTER — APPOINTMENT (OUTPATIENT)
Dept: CT IMAGING | Facility: CLINIC | Age: 59
End: 2024-12-17
Attending: EMERGENCY MEDICINE
Payer: COMMERCIAL

## 2024-12-17 ENCOUNTER — HOSPITAL ENCOUNTER (INPATIENT)
Facility: CLINIC | Age: 59
LOS: 2 days | Discharge: HOME | End: 2024-12-19
Attending: EMERGENCY MEDICINE | Admitting: STUDENT IN AN ORGANIZED HEALTH CARE EDUCATION/TRAINING PROGRAM
Payer: COMMERCIAL

## 2024-12-17 DIAGNOSIS — R10.814 LEFT LOWER QUADRANT ABDOMINAL TENDERNESS WITHOUT REBOUND TENDERNESS: ICD-10-CM

## 2024-12-17 DIAGNOSIS — R73.9 HYPERGLYCEMIA: ICD-10-CM

## 2024-12-17 DIAGNOSIS — K56.609 SBO (SMALL BOWEL OBSTRUCTION) (H): ICD-10-CM

## 2024-12-17 DIAGNOSIS — Z79.4 CONTROLLED TYPE 2 DIABETES MELLITUS WITH HYPERGLYCEMIA, WITH LONG-TERM CURRENT USE OF INSULIN (H): Primary | ICD-10-CM

## 2024-12-17 DIAGNOSIS — E11.65 CONTROLLED TYPE 2 DIABETES MELLITUS WITH HYPERGLYCEMIA, WITH LONG-TERM CURRENT USE OF INSULIN (H): Primary | ICD-10-CM

## 2024-12-17 LAB
ALBUMIN SERPL BCG-MCNC: 4.2 G/DL (ref 3.5–5.2)
ALBUMIN UR-MCNC: NEGATIVE MG/DL
ALP SERPL-CCNC: 141 U/L (ref 40–150)
ALT SERPL W P-5'-P-CCNC: 14 U/L (ref 0–70)
ANION GAP SERPL CALCULATED.3IONS-SCNC: 11 MMOL/L (ref 7–15)
APPEARANCE UR: CLEAR
AST SERPL W P-5'-P-CCNC: 19 U/L (ref 0–45)
ATRIAL RATE - MUSE: 84 BPM
B-OH-BUTYR SERPL-SCNC: <0.18 MMOL/L
BASE EXCESS BLDV CALC-SCNC: 0.2 MMOL/L (ref -3–3)
BASOPHILS # BLD AUTO: 0.1 10E3/UL (ref 0–0.2)
BASOPHILS NFR BLD AUTO: 1 %
BILIRUB SERPL-MCNC: 0.8 MG/DL
BILIRUB UR QL STRIP: NEGATIVE
BUN SERPL-MCNC: 14.3 MG/DL (ref 8–23)
CALCIUM SERPL-MCNC: 9.6 MG/DL (ref 8.8–10.4)
CHLORIDE SERPL-SCNC: 98 MMOL/L (ref 98–107)
COLOR UR AUTO: ABNORMAL
CREAT SERPL-MCNC: 0.86 MG/DL (ref 0.67–1.17)
DIASTOLIC BLOOD PRESSURE - MUSE: NORMAL MMHG
EGFRCR SERPLBLD CKD-EPI 2021: >90 ML/MIN/1.73M2
EOSINOPHIL # BLD AUTO: 0.3 10E3/UL (ref 0–0.7)
EOSINOPHIL NFR BLD AUTO: 4 %
ERYTHROCYTE [DISTWIDTH] IN BLOOD BY AUTOMATED COUNT: 13 % (ref 10–15)
GLUCOSE BLDC GLUCOMTR-MCNC: 342 MG/DL (ref 70–99)
GLUCOSE BLDC GLUCOMTR-MCNC: 453 MG/DL (ref 70–99)
GLUCOSE SERPL-MCNC: 444 MG/DL (ref 70–99)
GLUCOSE UR STRIP-MCNC: >=1000 MG/DL
HCO3 BLDV-SCNC: 25 MMOL/L (ref 21–28)
HCO3 SERPL-SCNC: 24 MMOL/L (ref 22–29)
HCT VFR BLD AUTO: 39.7 % (ref 40–53)
HGB BLD-MCNC: 13.9 G/DL (ref 13.3–17.7)
HGB UR QL STRIP: NEGATIVE
IMM GRANULOCYTES # BLD: 0 10E3/UL
IMM GRANULOCYTES NFR BLD: 1 %
INTERPRETATION ECG - MUSE: NORMAL
KETONES UR STRIP-MCNC: NEGATIVE MG/DL
LACTATE SERPL-SCNC: 1.4 MMOL/L (ref 0.7–2)
LEUKOCYTE ESTERASE UR QL STRIP: NEGATIVE
LIPASE SERPL-CCNC: 68 U/L (ref 13–60)
LYMPHOCYTES # BLD AUTO: 2.8 10E3/UL (ref 0.8–5.3)
LYMPHOCYTES NFR BLD AUTO: 40 %
MCH RBC QN AUTO: 28.8 PG (ref 26.5–33)
MCHC RBC AUTO-ENTMCNC: 35 G/DL (ref 31.5–36.5)
MCV RBC AUTO: 82 FL (ref 78–100)
MONOCYTES # BLD AUTO: 0.5 10E3/UL (ref 0–1.3)
MONOCYTES NFR BLD AUTO: 7 %
MUCOUS THREADS #/AREA URNS LPF: PRESENT /LPF
NEUTROPHILS # BLD AUTO: 3.3 10E3/UL (ref 1.6–8.3)
NEUTROPHILS NFR BLD AUTO: 48 %
NITRATE UR QL: NEGATIVE
NRBC # BLD AUTO: 0 10E3/UL
NRBC BLD AUTO-RTO: 0 /100
O2/TOTAL GAS SETTING VFR VENT: 21 %
OXYHGB MFR BLDV: 83 % (ref 70–75)
P AXIS - MUSE: 35 DEGREES
PCO2 BLDV: 42 MM HG (ref 40–50)
PH BLDV: 7.39 [PH] (ref 7.32–7.43)
PH UR STRIP: 5.5 [PH] (ref 5–7)
PLATELET # BLD AUTO: 394 10E3/UL (ref 150–450)
PO2 BLDV: 50 MM HG (ref 25–47)
POTASSIUM SERPL-SCNC: 4.2 MMOL/L (ref 3.4–5.3)
PR INTERVAL - MUSE: 128 MS
PROT SERPL-MCNC: 7.7 G/DL (ref 6.4–8.3)
QRS DURATION - MUSE: 90 MS
QT - MUSE: 364 MS
QTC - MUSE: 430 MS
R AXIS - MUSE: -22 DEGREES
RBC # BLD AUTO: 4.83 10E6/UL (ref 4.4–5.9)
RBC URINE: <1 /HPF
SAO2 % BLDV: 84.4 % (ref 70–75)
SODIUM SERPL-SCNC: 133 MMOL/L (ref 135–145)
SP GR UR STRIP: 1.01 (ref 1–1.03)
SYSTOLIC BLOOD PRESSURE - MUSE: NORMAL MMHG
T AXIS - MUSE: -6 DEGREES
UROBILINOGEN UR STRIP-MCNC: NORMAL MG/DL
VENTRICULAR RATE- MUSE: 84 BPM
WBC # BLD AUTO: 6.9 10E3/UL (ref 4–11)
WBC URINE: 2 /HPF

## 2024-12-17 PROCEDURE — 84132 ASSAY OF SERUM POTASSIUM: CPT | Performed by: EMERGENCY MEDICINE

## 2024-12-17 PROCEDURE — 99285 EMERGENCY DEPT VISIT HI MDM: CPT | Mod: 25 | Performed by: EMERGENCY MEDICINE

## 2024-12-17 PROCEDURE — 99285 EMERGENCY DEPT VISIT HI MDM: CPT | Performed by: EMERGENCY MEDICINE

## 2024-12-17 PROCEDURE — 250N000009 HC RX 250: Performed by: EMERGENCY MEDICINE

## 2024-12-17 PROCEDURE — 82962 GLUCOSE BLOOD TEST: CPT

## 2024-12-17 PROCEDURE — 74177 CT ABD & PELVIS W/CONTRAST: CPT

## 2024-12-17 PROCEDURE — 36415 COLL VENOUS BLD VENIPUNCTURE: CPT | Performed by: EMERGENCY MEDICINE

## 2024-12-17 PROCEDURE — 83690 ASSAY OF LIPASE: CPT | Performed by: EMERGENCY MEDICINE

## 2024-12-17 PROCEDURE — 120N000002 HC R&B MED SURG/OB UMMC

## 2024-12-17 PROCEDURE — 250N000012 HC RX MED GY IP 250 OP 636 PS 637: Performed by: STUDENT IN AN ORGANIZED HEALTH CARE EDUCATION/TRAINING PROGRAM

## 2024-12-17 PROCEDURE — 999N000065 XR ABDOMEN PORT 1 VIEW

## 2024-12-17 PROCEDURE — 82805 BLOOD GASES W/O2 SATURATION: CPT | Performed by: STUDENT IN AN ORGANIZED HEALTH CARE EDUCATION/TRAINING PROGRAM

## 2024-12-17 PROCEDURE — 250N000011 HC RX IP 250 OP 636: Performed by: EMERGENCY MEDICINE

## 2024-12-17 PROCEDURE — 81003 URINALYSIS AUTO W/O SCOPE: CPT | Performed by: EMERGENCY MEDICINE

## 2024-12-17 PROCEDURE — 80053 COMPREHEN METABOLIC PANEL: CPT | Performed by: EMERGENCY MEDICINE

## 2024-12-17 PROCEDURE — 82010 KETONE BODYS QUAN: CPT | Performed by: STUDENT IN AN ORGANIZED HEALTH CARE EDUCATION/TRAINING PROGRAM

## 2024-12-17 PROCEDURE — 85025 COMPLETE CBC W/AUTO DIFF WBC: CPT | Performed by: EMERGENCY MEDICINE

## 2024-12-17 PROCEDURE — 250N000009 HC RX 250: Performed by: STUDENT IN AN ORGANIZED HEALTH CARE EDUCATION/TRAINING PROGRAM

## 2024-12-17 PROCEDURE — 74177 CT ABD & PELVIS W/CONTRAST: CPT | Mod: 26 | Performed by: RADIOLOGY

## 2024-12-17 PROCEDURE — 83605 ASSAY OF LACTIC ACID: CPT | Performed by: EMERGENCY MEDICINE

## 2024-12-17 PROCEDURE — 74018 RADEX ABDOMEN 1 VIEW: CPT | Mod: 26 | Performed by: RADIOLOGY

## 2024-12-17 PROCEDURE — 82040 ASSAY OF SERUM ALBUMIN: CPT | Performed by: EMERGENCY MEDICINE

## 2024-12-17 PROCEDURE — 258N000003 HC RX IP 258 OP 636: Performed by: EMERGENCY MEDICINE

## 2024-12-17 PROCEDURE — 99223 1ST HOSP IP/OBS HIGH 75: CPT | Mod: GC | Performed by: STUDENT IN AN ORGANIZED HEALTH CARE EDUCATION/TRAINING PROGRAM

## 2024-12-17 RX ORDER — NICOTINE POLACRILEX 4 MG
15-30 LOZENGE BUCCAL
Status: DISCONTINUED | OUTPATIENT
Start: 2024-12-17 | End: 2024-12-19 | Stop reason: HOSPADM

## 2024-12-17 RX ORDER — HYDROMORPHONE HCL IN WATER/PF 6 MG/30 ML
0.4 PATIENT CONTROLLED ANALGESIA SYRINGE INTRAVENOUS EVERY 6 HOURS PRN
Status: DISCONTINUED | OUTPATIENT
Start: 2024-12-17 | End: 2024-12-19 | Stop reason: HOSPADM

## 2024-12-17 RX ORDER — LIDOCAINE 40 MG/G
CREAM TOPICAL
Status: DISCONTINUED | OUTPATIENT
Start: 2024-12-17 | End: 2024-12-19 | Stop reason: HOSPADM

## 2024-12-17 RX ORDER — CALCIUM CARBONATE 500 MG/1
1000 TABLET, CHEWABLE ORAL 4 TIMES DAILY PRN
Status: DISCONTINUED | OUTPATIENT
Start: 2024-12-17 | End: 2024-12-19 | Stop reason: HOSPADM

## 2024-12-17 RX ORDER — AMOXICILLIN 250 MG
1 CAPSULE ORAL 2 TIMES DAILY PRN
Status: DISCONTINUED | OUTPATIENT
Start: 2024-12-17 | End: 2024-12-19 | Stop reason: HOSPADM

## 2024-12-17 RX ORDER — HYDROMORPHONE HYDROCHLORIDE 1 MG/ML
0.5 INJECTION, SOLUTION INTRAMUSCULAR; INTRAVENOUS; SUBCUTANEOUS ONCE
Status: COMPLETED | OUTPATIENT
Start: 2024-12-17 | End: 2024-12-17

## 2024-12-17 RX ORDER — LIDOCAINE HYDROCHLORIDE 20 MG/ML
10 JELLY TOPICAL ONCE
Status: COMPLETED | OUTPATIENT
Start: 2024-12-17 | End: 2024-12-17

## 2024-12-17 RX ORDER — DEXTROSE MONOHYDRATE 25 G/50ML
25-50 INJECTION, SOLUTION INTRAVENOUS
Status: DISCONTINUED | OUTPATIENT
Start: 2024-12-17 | End: 2024-12-19 | Stop reason: HOSPADM

## 2024-12-17 RX ORDER — AMOXICILLIN 250 MG
2 CAPSULE ORAL 2 TIMES DAILY PRN
Status: DISCONTINUED | OUTPATIENT
Start: 2024-12-17 | End: 2024-12-19 | Stop reason: HOSPADM

## 2024-12-17 RX ORDER — IOPAMIDOL 755 MG/ML
97 INJECTION, SOLUTION INTRAVASCULAR ONCE
Status: COMPLETED | OUTPATIENT
Start: 2024-12-17 | End: 2024-12-17

## 2024-12-17 RX ADMIN — SODIUM CHLORIDE 1000 ML: 9 INJECTION, SOLUTION INTRAVENOUS at 17:45

## 2024-12-17 RX ADMIN — INSULIN ASPART 3 UNITS: 100 INJECTION, SOLUTION INTRAVENOUS; SUBCUTANEOUS at 21:42

## 2024-12-17 RX ADMIN — LIDOCAINE HYDROCHLORIDE 10 ML: 20 JELLY TOPICAL at 19:32

## 2024-12-17 RX ADMIN — HYDROMORPHONE HYDROCHLORIDE 0.5 MG: 1 INJECTION, SOLUTION INTRAMUSCULAR; INTRAVENOUS; SUBCUTANEOUS at 19:32

## 2024-12-17 RX ADMIN — IOPAMIDOL 97 ML: 755 INJECTION, SOLUTION INTRAVENOUS at 16:40

## 2024-12-17 RX ADMIN — INSULIN GLARGINE 5 UNITS: 100 INJECTION, SOLUTION SUBCUTANEOUS at 23:29

## 2024-12-17 RX ADMIN — SODIUM CHLORIDE, PRESERVATIVE FREE 74 ML: 5 INJECTION INTRAVENOUS at 16:41

## 2024-12-17 ASSESSMENT — ACTIVITIES OF DAILY LIVING (ADL)
ADLS_ACUITY_SCORE: 56

## 2024-12-17 NOTE — ED PROVIDER NOTES
ED Provider Note  [facility]      History     Chief Complaint   Patient presents with    Abdominal Pain     HPI  Andrew Collier is a 59 year old male with a history of multiple small bowel obstructions, HIV, DM II, and methamphetamine use who presents to the ED for evaluation of abdominal pain.  Patient states that he developed left-sided abdominal pain at 2:00 this morning.  He denies any associated nausea and vomiting.  He states his last bowel movement and flatulence was yesterday.  He has concern for another bowel obstruction.  He states he has not had anything to eat or drink today.  He states that he feels like his blood sugar is running high.  Patient denies any fever.  No chest pain or dyspnea.  No cough.    Past Medical History  Past Medical History:   Diagnosis Date    Acute appendicitis with localized peritonitis 1/31/2018    AIDS (H)     Allergic rhinitis due to other allergen     DNS    Anal dysplasia     Chronic abdominal pain     CNS toxoplasmosis (H)     COVID-19 1/11/2022    Diabetes type 2, controlled (H)     GERD (gastroesophageal reflux disease)     Herpes zoster 9/23/2016    History of seizure     History of substance abuse (H)     HIV (human immunodeficiency virus infection) (H)     HLD (hyperlipidemia)     Lung nodules     Periungual wart     Pneumonia 1/6/2019    PTSD (post-traumatic stress disorder)     Sleep apnea     doesn't use CPAP     Past Surgical History:   Procedure Laterality Date    ANOSCOPY N/A 9/9/2020    Procedure: Exam Under Anesthesia, ANOSCOPY, fulgeration of rectal fissures with Rectal Biopsies;  Surgeon: Thanh Lundberg MD;  Location: [facility] OR    COLONOSCOPY Left 1/22/2016    Procedure: COMBINED COLONOSCOPY, SINGLE OR MULTIPLE BIOPSY/POLYPECTOMY BY BIOPSY;  Surgeon: Clark Saini MD;  Location:  GI    ESOPHAGOSCOPY, GASTROSCOPY, DUODENOSCOPY (EGD), COMBINED N/A 6/6/2022    Procedure: ESOPHAGOGASTRODUODENOSCOPY, WITH BIOPSY;   Surgeon: Kecia Benítez MD;  Location: [facility] GI    ESOPHAGOSCOPY, GASTROSCOPY, DUODENOSCOPY (EGD), COMBINED N/A 10/10/2024    Procedure: Esophagoscopy, gastroscopy, duodenoscopy (EGD), combined;  Surgeon: Nico Bhatti DO;  Location: [facility] GI    HC EXPLORE UNDESC TESTIS,INGUIN/SCROTAL      LAPAROSCOPIC APPENDECTOMY N/A 1/31/2018    Procedure: LAPAROSCOPIC APPENDECTOMY;  LAPAROSCOPIC APPENDECTOMY;  Surgeon: Dawn Holt MD;  Location: [facility] OR    LAPAROSCOPY DIAGNOSTIC (GENERAL) N/A 7/26/2016    Procedure: LAPAROSCOPY DIAGNOSTIC (GENERAL);  Surgeon: Susannah Arriaga MD;  Location: [facility] OR    LAPAROSCOPY DIAGNOSTIC (GENERAL) N/A 4/16/2018    Procedure: LAPAROSCOPY DIAGNOSTIC (GENERAL);  Diagnostic laparoscopy and lysis of adhesions;  Surgeon: Prince Dowling MD;  Location: [facility] OR    OPTICAL TRACKING SYSTEM CRANIOTOMY, EXCISE TUMOR, COMBINED Left 4/10/2015    Procedure: COMBINED OPTICAL TRACKING SYSTEM CRANIOTOMY, EXCISE TUMOR;  Surgeon: Mirlande Colmenares MD;  Location: [facility] OR    REPAIR GAMEKEEPER'S THUMB Right 12/2/2016    Procedure: REPAIR LIGAMENT ULNAR COLLATERAL THUMB (GAMEKEEPER'S);  Surgeon: Evin Zamorano MD;  Location: [facility] OR    ZZC NONSPECIFIC PROCEDURE      right forearm fracture     albuterol (PROAIR HFA/PROVENTIL HFA/VENTOLIN HFA) 108 (90 Base) MCG/ACT inhaler  albuterol (PROAIR HFA/PROVENTIL HFA/VENTOLIN HFA) 108 (90 Base) MCG/ACT inhaler  azithromycin (ZITHROMAX Z-OLIVERIO) 250 MG tablet  bictegravir-emtricitabine-tenofovir (BIKTARVY) -25 MG per tablet  blood glucose (NO BRAND SPECIFIED) lancets standard  blood glucose (NO BRAND SPECIFIED) test strip  blood glucose monitoring (NO BRAND SPECIFIED) meter device kit  Continuous Glucose Sensor (DEXCOM G6 SENSOR) MISC  Continuous Glucose Sensor (DEXCOM G7 SENSOR) MISC  Continuous Glucose Transmitter (DEXCOM G6 TRANSMITTER) MISC  insulin aspart (NOVOLOG PEN) 100 UNIT/ML pen  insulin glargine (LANTUS PEN) 100 UNIT/ML pen  insulin pen  "needle (32G X 4 MM) 32G X 4 MM miscellaneous  naproxen (NAPROSYN) 500 MG tablet  Nutritional Supplements (ENSURE ACTIVE) LIQD  pantoprazole (PROTONIX) 40 MG EC tablet  polymixin b-trimethoprim (POLYTRIM) 13469-6.1 UNIT/ML-% ophthalmic solution      Allergies   Allergen Reactions    Fentanyl Blisters     Per pt, after taking this medication had blisters develope    Tylenol [Acetaminophen] Itching    Dulaglutide Rash    Insulin Lispro Rash     Patient reported    Penicillin V Other (See Comments) and Rash     Diffuse maculopapular rash + feels \"high\", per pt.      Family History  Family History   Problem Relation Age of Onset    Diabetes Brother     Diabetes Father     Alzheimer Disease Father     Unknown/Adopted Mother     Diabetes Paternal Grandfather     Cancer No family hx of         no skin cancer    Skin Cancer No family hx of         no famiy hx of skin cancer    Glaucoma No family hx of     Macular Degeneration No family hx of      Social History   Social History     Tobacco Use    Smoking status: Some Days     Current packs/day: 0.25     Average packs/day: 0.3 packs/day for 40.0 years (10.0 ttl pk-yrs)     Types: Cigarettes    Smokeless tobacco: Former   Substance Use Topics    Alcohol use: No     Alcohol/week: 0.0 standard drinks of alcohol     Comment: Last etoh in 2007    Drug use: Not Currently     Types: Marijuana     Comment: chart indicated meth use but pt denies; states he does not use drugs      Past medical history, past surgical history, medications, allergies, family history, and social history were reviewed with the patient. No additional pertinent items.   A medically appropriate review of systems was performed with pertinent positives and negatives noted in the HPI, and all other systems negative.    Physical Exam   BP: (!) 140/84  Pulse: 95  Temp: 98.3  F (36.8  C)  Resp: 18  SpO2: 98 %  Physical Exam  Vitals and nursing note reviewed.   Constitutional:       General: He is not in acute " distress.     Appearance: He is not diaphoretic.   HENT:      Head: Atraumatic.   Eyes:      General: No scleral icterus.     Pupils: Pupils are equal, round, and reactive to light.   Cardiovascular:      Rate and Rhythm: Normal rate and regular rhythm.      Heart sounds: Normal heart sounds.   Pulmonary:      Effort: No respiratory distress.      Breath sounds: Normal breath sounds.   Abdominal:      General: Bowel sounds are normal.      Palpations: Abdomen is soft.      Tenderness: There is abdominal tenderness. There is no guarding or rebound.       Musculoskeletal:         General: No tenderness.   Skin:     General: Skin is warm and dry.      Findings: No rash.   Neurological:      General: No focal deficit present.      Motor: No weakness.      Coordination: Coordination normal.   Psychiatric:         Mood and Affect: Mood normal.           ED Course, Procedures, & Data      Reviewed Emergency Department and hospital admission for small bowel obstruction encounter on 11/11/2024.  Reviewed associated CBC, competence of metabolic panel, CT abdomen/pelvis.    Procedures             Results for orders placed or performed during the hospital encounter of 12/17/24   CT Abdomen Pelvis w Contrast     Status: None    Narrative    EXAMINATION: CT ABDOMEN PELVIS W CONTRAST, 12/17/2024 4:51 PM    INDICATION: Left-sided abdominal pain- eval for SBO    COMPARISON STUDY: CT abdomen and pelvis dated 11/21/2024 and multiple  priors.    TECHNIQUE: CT scan of the abdomen and pelvis was performed on  multidetector CT scanner using volumetric acquisition technique and  images were reconstructed in multiple planes with variable thickness  and reviewed on dedicated workstations.     CONTRAST: iopamidol (ISOVUE-370) solution 97 mL injected IV without  oral contrast    CT scan radiation dose is optimized to minimum requisite dose using  automated dose modulation techniques.    FINDINGS:    Lower thorax: Normal.  Mild bibasilar  atelectasis    Liver: No mass. No intrahepatic biliary ductal dilation.    Biliary System: Cholelithiasis without evidence of cholecystitis. No  extrahepatic biliary ductal dilation.    Pancreas: No mass or pancreatic ductal dilation.    Adrenal glands: No mass or nodules    Spleen: Normal.    Kidneys: No suspicious mass, obstructing calculus or hydronephrosis.   Focal cortical hypodensities, too small to characterize and  statistically favored to represent simple cysts.    Gastrointestinal tract : There is a dilated loop of small bowel in the  left hemiabdomen measuring up to 4.2 cm, with 2 apparent transition  points (series 4, image 296, image 231); as seen on prior CT exams.  Remainder of the small bowel is normal in caliber. Moderate colonic  stool burden noted.    Mesentery/peritoneum/retroperitoneum: No mass. No free fluid or air.    Lymph nodes: No significant lymphadenopathy.    Vasculature: Patent major abdominal vasculature.    Pelvis: Urinary bladder shows concentric wall thickening.   Prostatomegaly, prostate measures 5.5 mm in transverse dimension.  Stable appearance of the penile implant with deflated reservoir in the  pelvis.    Osseous structures: No aggressive or acute osseous lesion.  Multilevel  degenerative changes of the visualized spine.      Soft tissues: Small fat containing ventral/umbilical hernia.   Nonspecific focal subcutaneous nodularity in the ventral abdominal  wall likely represents sequelae of medication injections.      Impression    IMPRESSION:   1. Small bowel obstruction centered about the left hemiabdomen with a  dilated bowel loop measuring up to 4.2 cm. As seen on prior CT, the  involved small bowel segment demonstrates two apparent transition  points suspicious for developing closed loop obstruction. No evidence  of pneumatosis.  2. Concentric urinary bladder wall thickening likely represents  changes of chronic partial outlet obstruction.  3. Cholelithiasis without acute  cholecystitis.    I have personally reviewed the examination and initial interpretation  and I agree with the findings.    BORIS PAYTON MD         SYSTEM ID:  [id number]   Comprehensive metabolic panel     Status: Abnormal   Result Value Ref Range    Sodium 133 (L) 135 - 145 mmol/L    Potassium 4.2 3.4 - 5.3 mmol/L    Carbon Dioxide (CO2) 24 22 - 29 mmol/L    Anion Gap 11 7 - 15 mmol/L    Urea Nitrogen 14.3 8.0 - 23.0 mg/dL    Creatinine 0.86 0.67 - 1.17 mg/dL    GFR Estimate >90 >60 mL/min/1.73m2    Calcium 9.6 8.8 - 10.4 mg/dL    Chloride 98 98 - 107 mmol/L    Glucose 444 (H) 70 - 99 mg/dL    Alkaline Phosphatase 141 40 - 150 U/L    AST 19 0 - 45 U/L    ALT 14 0 - 70 U/L    Protein Total 7.7 6.4 - 8.3 g/dL    Albumin 4.2 3.5 - 5.2 g/dL    Bilirubin Total 0.8 <=1.2 mg/dL   Lipase     Status: Abnormal   Result Value Ref Range    Lipase 68 (H) 13 - 60 U/L   Lactic acid whole blood with 1x repeat in 2 hr when >2     Status: Normal   Result Value Ref Range    Lactic Acid, Initial 1.4 0.7 - 2.0 mmol/L   UA with Microscopic reflex to Culture     Status: Abnormal    Specimen: Urine, Clean Catch   Result Value Ref Range    Color Urine Light Yellow Colorless, Straw, Light Yellow, Yellow    Appearance Urine Clear Clear    Glucose Urine >=1000 (A) Negative mg/dL    Bilirubin Urine Negative Negative    Ketones Urine Negative Negative mg/dL    Specific Gravity Urine 1.010 1.003 - 1.035    Blood Urine Negative Negative    pH Urine 5.5 5.0 - 7.0    Protein Albumin Urine Negative Negative mg/dL    Urobilinogen Urine Normal Normal, 2.0 mg/dL    Nitrite Urine Negative Negative    Leukocyte Esterase Urine Negative Negative    Mucus Urine Present (A) None Seen /LPF    RBC Urine <1 <=2 /HPF    WBC Urine 2 <=5 /HPF    Narrative    Urine Culture not indicated   CBC with platelets and differential     Status: Abnormal   Result Value Ref Range    WBC Count 6.9 4.0 - 11.0 10e3/uL    RBC Count 4.83 4.40 - 5.90 10e6/uL    Hemoglobin 13.9  13.3 - 17.7 g/dL    Hematocrit 39.7 (L) 40.0 - 53.0 %    MCV 82 78 - 100 fL    MCH 28.8 26.5 - 33.0 pg    MCHC 35.0 31.5 - 36.5 g/dL    RDW 13.0 10.0 - 15.0 %    Platelet Count 394 150 - 450 10e3/uL    % Neutrophils 48 %    % Lymphocytes 40 %    % Monocytes 7 %    % Eosinophils 4 %    % Basophils 1 %    % Immature Granulocytes 1 %    NRBCs per 100 WBC 0 <1 /100    Absolute Neutrophils 3.3 1.6 - 8.3 10e3/uL    Absolute Lymphocytes 2.8 0.8 - 5.3 10e3/uL    Absolute Monocytes 0.5 0.0 - 1.3 10e3/uL    Absolute Eosinophils 0.3 0.0 - 0.7 10e3/uL    Absolute Basophils 0.1 0.0 - 0.2 10e3/uL    Absolute Immature Granulocytes 0.0 <=0.4 10e3/uL    Absolute NRBCs 0.0 10e3/uL   Glucose by meter     Status: Abnormal   Result Value Ref Range    GLUCOSE BY METER POCT 453 (H) 70 - 99 mg/dL   Blood gas venous     Status: Abnormal   Result Value Ref Range    pH Venous 7.39 7.32 - 7.43    pCO2 Venous 42 40 - 50 mm Hg    pO2 Venous 50 (H) 25 - 47 mm Hg    Bicarbonate Venous 25 21 - 28 mmol/L    Base Excess/Deficit Venous 0.2 -3.0 - 3.0 mmol/L    FIO2 21     Oxyhemoglobin Venous 83 (H) 70 - 75 %    O2 Sat, Venous 84.4 (H) 70.0 - 75.0 %    Narrative    In healthy individuals, oxyhemoglobin (O2Hb) and oxygen saturation (SO2) are approximately equal. In the presence of dyshemoglobins, oxyhemoglobin can be considerably lower than oxygen saturation.   EKG 12-lead, complete     Status: None (Preliminary result)   Result Value Ref Range    Systolic Blood Pressure  mmHg    Diastolic Blood Pressure  mmHg    Ventricular Rate 84 BPM    Atrial Rate 84 BPM    DC Interval 128 ms    QRS Duration 90 ms     ms    QTc 430 ms    P Axis 35 degrees    R AXIS -22 degrees    T Axis -6 degrees    Interpretation ECG Sinus rhythm  Normal ECG      CBC with platelets differential     Status: Abnormal    Narrative    The following orders were created for panel order CBC with platelets differential.  Procedure                               Abnormality          Status                     ---------                               -----------         ------                     CBC with platelets and d...[[id number]]  Abnormal            Final result               RBC and Platelet Morphology[[id number]]                                                   Please view results for these tests on the individual orders.         Results for orders placed or performed during the hospital encounter of 12/17/24   CT Abdomen Pelvis w Contrast     Status: None    Narrative    EXAMINATION: CT ABDOMEN PELVIS W CONTRAST, 12/17/2024 4:51 PM    INDICATION: Left-sided abdominal pain- eval for SBO    COMPARISON STUDY: CT abdomen and pelvis dated 11/21/2024 and multiple  priors.    TECHNIQUE: CT scan of the abdomen and pelvis was performed on  multidetector CT scanner using volumetric acquisition technique and  images were reconstructed in multiple planes with variable thickness  and reviewed on dedicated workstations.     CONTRAST: iopamidol (ISOVUE-370) solution 97 mL injected IV without  oral contrast    CT scan radiation dose is optimized to minimum requisite dose using  automated dose modulation techniques.    FINDINGS:    Lower thorax: Normal.  Mild bibasilar atelectasis    Liver: No mass. No intrahepatic biliary ductal dilation.    Biliary System: Cholelithiasis without evidence of cholecystitis. No  extrahepatic biliary ductal dilation.    Pancreas: No mass or pancreatic ductal dilation.    Adrenal glands: No mass or nodules    Spleen: Normal.    Kidneys: No suspicious mass, obstructing calculus or hydronephrosis.   Focal cortical hypodensities, too small to characterize and  statistically favored to represent simple cysts.    Gastrointestinal tract : There is a dilated loop of small bowel in the  left hemiabdomen measuring up to 4.2 cm, with 2 apparent transition  points (series 4, image 296, image 231); as seen on prior CT exams.  Remainder of the small bowel is normal in caliber. Moderate  colonic  stool burden noted.    Mesentery/peritoneum/retroperitoneum: No mass. No free fluid or air.    Lymph nodes: No significant lymphadenopathy.    Vasculature: Patent major abdominal vasculature.    Pelvis: Urinary bladder shows concentric wall thickening.   Prostatomegaly, prostate measures 5.5 mm in transverse dimension.  Stable appearance of the penile implant with deflated reservoir in the  pelvis.    Osseous structures: No aggressive or acute osseous lesion.  Multilevel  degenerative changes of the visualized spine.      Soft tissues: Small fat containing ventral/umbilical hernia.   Nonspecific focal subcutaneous nodularity in the ventral abdominal  wall likely represents sequelae of medication injections.      Impression    IMPRESSION:   1. Small bowel obstruction centered about the left hemiabdomen with a  dilated bowel loop measuring up to 4.2 cm. As seen on prior CT, the  involved small bowel segment demonstrates two apparent transition  points suspicious for developing closed loop obstruction. No evidence  of pneumatosis.  2. Concentric urinary bladder wall thickening likely represents  changes of chronic partial outlet obstruction.  3. Cholelithiasis without acute cholecystitis.    I have personally reviewed the examination and initial interpretation  and I agree with the findings.    BORIS PAYTON MD         SYSTEM ID:  [id number]   Comprehensive metabolic panel     Status: Abnormal   Result Value Ref Range    Sodium 133 (L) 135 - 145 mmol/L    Potassium 4.2 3.4 - 5.3 mmol/L    Carbon Dioxide (CO2) 24 22 - 29 mmol/L    Anion Gap 11 7 - 15 mmol/L    Urea Nitrogen 14.3 8.0 - 23.0 mg/dL    Creatinine 0.86 0.67 - 1.17 mg/dL    GFR Estimate >90 >60 mL/min/1.73m2    Calcium 9.6 8.8 - 10.4 mg/dL    Chloride 98 98 - 107 mmol/L    Glucose 444 (H) 70 - 99 mg/dL    Alkaline Phosphatase 141 40 - 150 U/L    AST 19 0 - 45 U/L    ALT 14 0 - 70 U/L    Protein Total 7.7 6.4 - 8.3 g/dL    Albumin 4.2 3.5 - 5.2 g/dL     Bilirubin Total 0.8 <=1.2 mg/dL   Lipase     Status: Abnormal   Result Value Ref Range    Lipase 68 (H) 13 - 60 U/L   Lactic acid whole blood with 1x repeat in 2 hr when >2     Status: Normal   Result Value Ref Range    Lactic Acid, Initial 1.4 0.7 - 2.0 mmol/L   UA with Microscopic reflex to Culture     Status: Abnormal    Specimen: Urine, Clean Catch   Result Value Ref Range    Color Urine Light Yellow Colorless, Straw, Light Yellow, Yellow    Appearance Urine Clear Clear    Glucose Urine >=1000 (A) Negative mg/dL    Bilirubin Urine Negative Negative    Ketones Urine Negative Negative mg/dL    Specific Gravity Urine 1.010 1.003 - 1.035    Blood Urine Negative Negative    pH Urine 5.5 5.0 - 7.0    Protein Albumin Urine Negative Negative mg/dL    Urobilinogen Urine Normal Normal, 2.0 mg/dL    Nitrite Urine Negative Negative    Leukocyte Esterase Urine Negative Negative    Mucus Urine Present (A) None Seen /LPF    RBC Urine <1 <=2 /HPF    WBC Urine 2 <=5 /HPF    Narrative    Urine Culture not indicated   CBC with platelets and differential     Status: Abnormal   Result Value Ref Range    WBC Count 6.9 4.0 - 11.0 10e3/uL    RBC Count 4.83 4.40 - 5.90 10e6/uL    Hemoglobin 13.9 13.3 - 17.7 g/dL    Hematocrit 39.7 (L) 40.0 - 53.0 %    MCV 82 78 - 100 fL    MCH 28.8 26.5 - 33.0 pg    MCHC 35.0 31.5 - 36.5 g/dL    RDW 13.0 10.0 - 15.0 %    Platelet Count 394 150 - 450 10e3/uL    % Neutrophils 48 %    % Lymphocytes 40 %    % Monocytes 7 %    % Eosinophils 4 %    % Basophils 1 %    % Immature Granulocytes 1 %    NRBCs per 100 WBC 0 <1 /100    Absolute Neutrophils 3.3 1.6 - 8.3 10e3/uL    Absolute Lymphocytes 2.8 0.8 - 5.3 10e3/uL    Absolute Monocytes 0.5 0.0 - 1.3 10e3/uL    Absolute Eosinophils 0.3 0.0 - 0.7 10e3/uL    Absolute Basophils 0.1 0.0 - 0.2 10e3/uL    Absolute Immature Granulocytes 0.0 <=0.4 10e3/uL    Absolute NRBCs 0.0 10e3/uL   Glucose by meter     Status: Abnormal   Result Value Ref Range    GLUCOSE BY  METER POCT 453 (H) 70 - 99 mg/dL   Blood gas venous     Status: Abnormal   Result Value Ref Range    pH Venous 7.39 7.32 - 7.43    pCO2 Venous 42 40 - 50 mm Hg    pO2 Venous 50 (H) 25 - 47 mm Hg    Bicarbonate Venous 25 21 - 28 mmol/L    Base Excess/Deficit Venous 0.2 -3.0 - 3.0 mmol/L    FIO2 21     Oxyhemoglobin Venous 83 (H) 70 - 75 %    O2 Sat, Venous 84.4 (H) 70.0 - 75.0 %    Narrative    In healthy individuals, oxyhemoglobin (O2Hb) and oxygen saturation (SO2) are approximately equal. In the presence of dyshemoglobins, oxyhemoglobin can be considerably lower than oxygen saturation.   EKG 12-lead, complete     Status: None (Preliminary result)   Result Value Ref Range    Systolic Blood Pressure  mmHg    Diastolic Blood Pressure  mmHg    Ventricular Rate 84 BPM    Atrial Rate 84 BPM    WV Interval 128 ms    QRS Duration 90 ms     ms    QTc 430 ms    P Axis 35 degrees    R AXIS -22 degrees    T Axis -6 degrees    Interpretation ECG Sinus rhythm  Normal ECG      CBC with platelets differential     Status: Abnormal    Narrative    The following orders were created for panel order CBC with platelets differential.  Procedure                               Abnormality         Status                     ---------                               -----------         ------                     CBC with platelets and d...[[id number]]  Abnormal            Final result               RBC and Platelet Morphology[[id number]]                                                   Please view results for these tests on the individual orders.     Medications   lidocaine 1 % 0.1-1 mL (has no administration in time range)   lidocaine (LMX4) cream (has no administration in time range)   sodium chloride (PF) 0.9% PF flush 3 mL (3 mLs Intracatheter $Given 12/17/24 1932)   sodium chloride (PF) 0.9% PF flush 3 mL (has no administration in time range)   senna-docusate (SENOKOT-S/PERICOLACE) 8.6-50 MG per tablet 1 tablet (has no administration  in time range)     Or   senna-docusate (SENOKOT-S/PERICOLACE) 8.6-50 MG per tablet 2 tablet (has no administration in time range)   calcium carbonate (TUMS) chewable tablet 1,000 mg (has no administration in time range)   CT saline (74 mLs Intravenous $Given 12/17/24 1641)   iopamidol (ISOVUE-370) solution 97 mL (97 mLs Intravenous $Given 12/17/24 1640)   sodium chloride 0.9% BOLUS 1,000 mL (1,000 mLs Intravenous $New Bag 12/17/24 1745)   lidocaine (XYLOCAINE) 2 % external gel 10 mL (10 mLs Urethral $Given 12/17/24 1932)   HYDROmorphone (PF) (DILAUDID) injection 0.5 mg (0.5 mg Intravenous $Given 12/17/24 1932)     Labs Ordered and Resulted from Time of ED Arrival to Time of ED Departure   COMPREHENSIVE METABOLIC PANEL - Abnormal       Result Value    Sodium 133 (*)     Potassium 4.2      Carbon Dioxide (CO2) 24      Anion Gap 11      Urea Nitrogen 14.3      Creatinine 0.86      GFR Estimate >90      Calcium 9.6      Chloride 98      Glucose 444 (*)     Alkaline Phosphatase 141      AST 19      ALT 14      Protein Total 7.7      Albumin 4.2      Bilirubin Total 0.8     LIPASE - Abnormal    Lipase 68 (*)    ROUTINE UA WITH MICROSCOPIC REFLEX TO CULTURE - Abnormal    Color Urine Light Yellow      Appearance Urine Clear      Glucose Urine >=1000 (*)     Bilirubin Urine Negative      Ketones Urine Negative      Specific Gravity Urine 1.010      Blood Urine Negative      pH Urine 5.5      Protein Albumin Urine Negative      Urobilinogen Urine Normal      Nitrite Urine Negative      Leukocyte Esterase Urine Negative      Mucus Urine Present (*)     RBC Urine <1      WBC Urine 2     CBC WITH PLATELETS AND DIFFERENTIAL - Abnormal    WBC Count 6.9      RBC Count 4.83      Hemoglobin 13.9      Hematocrit 39.7 (*)     MCV 82      MCH 28.8      MCHC 35.0      RDW 13.0      Platelet Count 394      % Neutrophils 48      % Lymphocytes 40      % Monocytes 7      % Eosinophils 4      % Basophils 1      % Immature Granulocytes 1       NRBCs per 100 WBC 0      Absolute Neutrophils 3.3      Absolute Lymphocytes 2.8      Absolute Monocytes 0.5      Absolute Eosinophils 0.3      Absolute Basophils 0.1      Absolute Immature Granulocytes 0.0      Absolute NRBCs 0.0     GLUCOSE BY METER - Abnormal    GLUCOSE BY METER POCT 453 (*)    BLOOD GAS VENOUS - Abnormal    pH Venous 7.39      pCO2 Venous 42      pO2 Venous 50 (*)     Bicarbonate Venous 25      Base Excess/Deficit Venous 0.2      FIO2 21      Oxyhemoglobin Venous 83 (*)     O2 Sat, Venous 84.4 (*)    LACTIC ACID WHOLE BLOOD WITH 1X REPEAT IN 2 HR WHEN >2 - Normal    Lactic Acid, Initial 1.4     KETONE BETA-HYDROXYBUTYRATE QUANTITATIVE, RAPID   GLUCOSE MONITOR NURSING POCT   GLUCOSE MONITOR NURSING POCT   ISTAT CREATININE POCT     CT Abdomen Pelvis w Contrast   Final Result   IMPRESSION:    1. Small bowel obstruction centered about the left hemiabdomen with a   dilated bowel loop measuring up to 4.2 cm. As seen on prior CT, the   involved small bowel segment demonstrates two apparent transition   points suspicious for developing closed loop obstruction. No evidence   of pneumatosis.   2. Concentric urinary bladder wall thickening likely represents   changes of chronic partial outlet obstruction.   3. Cholelithiasis without acute cholecystitis.      I have personally reviewed the examination and initial interpretation   and I agree with the findings.      BORIS PAYTON MD            SYSTEM ID:  [id number]      XR Abdomen Port 1 View    (Results Pending)     Calls/Consults  Hospitalist: Called (12/17/24 0198)    Critical care was not performed.     Medical Decision Making  The patient's presentation was of moderate complexity (a chronic illness mild to moderate exacerbation, progression, or side effect of treatment).    The patient's evaluation involved:  review of external note(s) from 2 sources (see separate area of note for details)  review of 3+ test result(s) ordered prior to this encounter  (see separate area of note for details)  ordering and/or review of 3+ test(s) in this encounter (see separate area of note for details)  discussion of management or test interpretation with another health professional (admitting hospitalist)    The patient's management necessitated high risk (a decision regarding hospitalization).    Assessment & Plan    59 year old male with history of HIV, diabetes, recurrent small bowel obstruction to the emergency department for left sided abdominal pain.  He is concerned about recurrent small bowel obstruction.  He has hypertension but otherwise normal vital signs.  He has tenderness without guarding to the left mid abdomen.  Patient does not have any significant lab abnormality except hyperglycemia to 444.  He is not acidotic.  The patient was treated with IV fluids.  CT of abdomen/pelvis reveals small bowel obstruction.  He again has 2 transition points concerning for developing closed-loop obstruction.  He has had this in the past (in November) and it resolved with NG tube decompression.  NG tube placed.  General surgery has been consulted.  Patient will be admitted to internal medicine for further evaluation and management.    I have reviewed the nursing notes. I have reviewed the findings, diagnosis, plan and need for follow up with the patient.    New Prescriptions    No medications on file       Final diagnoses:   SBO (small bowel obstruction) (H)   Hyperglycemia     Chart documentation was completed with Dragon voice-recognition software. Even though reviewed, this chart may still contain some grammatical, spelling, and word errors.       [facility] EMERGENCY DEPARTMENT  12/17/2024     Wojciech aMckay MD  12/17/24 2025

## 2024-12-17 NOTE — ED TRIAGE NOTES
Arrives ambulatory with concerns of SBO. Per patient he gets them frequently. Unable to have a BM and not passing gas.     Triage Assessment (Adult)       Row Name 12/17/24 4630          Triage Assessment    Airway WDL WDL        Respiratory WDL    Respiratory WDL WDL        Skin Circulation/Temperature WDL    Skin Circulation/Temperature WDL WDL        Cardiac WDL    Cardiac WDL WDL        Peripheral/Neurovascular WDL    Peripheral Neurovascular WDL WDL        Cognitive/Neuro/Behavioral WDL    Cognitive/Neuro/Behavioral WDL WDL        Sheyla Coma Scale    Best Eye Response 4-->(E4) spontaneous     Best Motor Response 6-->(M6) obeys commands     Best Verbal Response 5-->(V5) oriented     Sheyla Coma Scale Score 15

## 2024-12-17 NOTE — TELEPHONE ENCOUNTER
EP LVM 12/17 to sched a follow up with Dorie Holland.     Dorie Holland, [facility]  [id number]  Can you please reach out to schedule in person follow-up with me to discuss blood sugar?    Thank you  Dorie Holland, PharmD, [facility]  Medication Therapy Management Pharmacist

## 2024-12-18 ENCOUNTER — APPOINTMENT (OUTPATIENT)
Dept: GENERAL RADIOLOGY | Facility: CLINIC | Age: 59
End: 2024-12-18
Attending: STUDENT IN AN ORGANIZED HEALTH CARE EDUCATION/TRAINING PROGRAM
Payer: COMMERCIAL

## 2024-12-18 LAB
ALBUMIN SERPL BCG-MCNC: 3.5 G/DL (ref 3.5–5.2)
ALP SERPL-CCNC: 99 U/L (ref 40–150)
ALT SERPL W P-5'-P-CCNC: <5 U/L (ref 0–70)
ANION GAP SERPL CALCULATED.3IONS-SCNC: 10 MMOL/L (ref 7–15)
AST SERPL W P-5'-P-CCNC: 17 U/L (ref 0–45)
BASOPHILS # BLD AUTO: 0.1 10E3/UL (ref 0–0.2)
BASOPHILS NFR BLD AUTO: 1 %
BILIRUB SERPL-MCNC: 0.6 MG/DL
BUN SERPL-MCNC: 15.1 MG/DL (ref 8–23)
BURR CELLS BLD QL SMEAR: SLIGHT
CALCIUM SERPL-MCNC: 8.6 MG/DL (ref 8.8–10.4)
CHLORIDE SERPL-SCNC: 103 MMOL/L (ref 98–107)
CREAT SERPL-MCNC: 0.72 MG/DL (ref 0.67–1.17)
EGFRCR SERPLBLD CKD-EPI 2021: >90 ML/MIN/1.73M2
EOSINOPHIL # BLD AUTO: 0.3 10E3/UL (ref 0–0.7)
EOSINOPHIL NFR BLD AUTO: 4 %
ERYTHROCYTE [DISTWIDTH] IN BLOOD BY AUTOMATED COUNT: 13.1 % (ref 10–15)
GLUCOSE BLDC GLUCOMTR-MCNC: 108 MG/DL (ref 70–99)
GLUCOSE BLDC GLUCOMTR-MCNC: 149 MG/DL (ref 70–99)
GLUCOSE BLDC GLUCOMTR-MCNC: 170 MG/DL (ref 70–99)
GLUCOSE BLDC GLUCOMTR-MCNC: 235 MG/DL (ref 70–99)
GLUCOSE BLDC GLUCOMTR-MCNC: 247 MG/DL (ref 70–99)
GLUCOSE BLDC GLUCOMTR-MCNC: 254 MG/DL (ref 70–99)
GLUCOSE BLDC GLUCOMTR-MCNC: 259 MG/DL (ref 70–99)
GLUCOSE BLDC GLUCOMTR-MCNC: 276 MG/DL (ref 70–99)
GLUCOSE BLDC GLUCOMTR-MCNC: 287 MG/DL (ref 70–99)
GLUCOSE BLDC GLUCOMTR-MCNC: 310 MG/DL (ref 70–99)
GLUCOSE BLDC GLUCOMTR-MCNC: 361 MG/DL (ref 70–99)
GLUCOSE SERPL-MCNC: 285 MG/DL (ref 70–99)
HCO3 SERPL-SCNC: 23 MMOL/L (ref 22–29)
HCT VFR BLD AUTO: 36.5 % (ref 40–53)
HGB BLD-MCNC: 12.3 G/DL (ref 13.3–17.7)
IMM GRANULOCYTES # BLD: 0 10E3/UL
IMM GRANULOCYTES NFR BLD: 0 %
LYMPHOCYTES # BLD AUTO: 2.7 10E3/UL (ref 0.8–5.3)
LYMPHOCYTES NFR BLD AUTO: 40 %
MCH RBC QN AUTO: 28.7 PG (ref 26.5–33)
MCHC RBC AUTO-ENTMCNC: 33.7 G/DL (ref 31.5–36.5)
MCV RBC AUTO: 85 FL (ref 78–100)
MONOCYTES # BLD AUTO: 0.4 10E3/UL (ref 0–1.3)
MONOCYTES NFR BLD AUTO: 7 %
NEUTROPHILS # BLD AUTO: 3.3 10E3/UL (ref 1.6–8.3)
NEUTROPHILS NFR BLD AUTO: 48 %
NRBC # BLD AUTO: 0 10E3/UL
NRBC BLD AUTO-RTO: 0 /100
PLAT MORPH BLD: ABNORMAL
PLATELET # BLD AUTO: 335 10E3/UL (ref 150–450)
POLYCHROMASIA BLD QL SMEAR: SLIGHT
POTASSIUM SERPL-SCNC: 4 MMOL/L (ref 3.4–5.3)
PROT SERPL-MCNC: 6.4 G/DL (ref 6.4–8.3)
RBC # BLD AUTO: 4.29 10E6/UL (ref 4.4–5.9)
RBC MORPH BLD: ABNORMAL
SODIUM SERPL-SCNC: 136 MMOL/L (ref 135–145)
WBC # BLD AUTO: 6.8 10E3/UL (ref 4–11)

## 2024-12-18 PROCEDURE — 82962 GLUCOSE BLOOD TEST: CPT

## 2024-12-18 PROCEDURE — 250N000011 HC RX IP 250 OP 636: Performed by: STUDENT IN AN ORGANIZED HEALTH CARE EDUCATION/TRAINING PROGRAM

## 2024-12-18 PROCEDURE — 74018 RADEX ABDOMEN 1 VIEW: CPT

## 2024-12-18 PROCEDURE — 99232 SBSQ HOSP IP/OBS MODERATE 35: CPT | Performed by: INTERNAL MEDICINE

## 2024-12-18 PROCEDURE — 255N000002 HC RX 255 OP 636: Performed by: STUDENT IN AN ORGANIZED HEALTH CARE EDUCATION/TRAINING PROGRAM

## 2024-12-18 PROCEDURE — 36415 COLL VENOUS BLD VENIPUNCTURE: CPT | Performed by: STUDENT IN AN ORGANIZED HEALTH CARE EDUCATION/TRAINING PROGRAM

## 2024-12-18 PROCEDURE — 120N000002 HC R&B MED SURG/OB UMMC

## 2024-12-18 PROCEDURE — 82247 BILIRUBIN TOTAL: CPT | Performed by: STUDENT IN AN ORGANIZED HEALTH CARE EDUCATION/TRAINING PROGRAM

## 2024-12-18 PROCEDURE — 84155 ASSAY OF PROTEIN SERUM: CPT | Performed by: STUDENT IN AN ORGANIZED HEALTH CARE EDUCATION/TRAINING PROGRAM

## 2024-12-18 PROCEDURE — 250N000009 HC RX 250: Performed by: STUDENT IN AN ORGANIZED HEALTH CARE EDUCATION/TRAINING PROGRAM

## 2024-12-18 PROCEDURE — 258N000003 HC RX IP 258 OP 636: Performed by: INTERNAL MEDICINE

## 2024-12-18 PROCEDURE — 84520 ASSAY OF UREA NITROGEN: CPT | Performed by: STUDENT IN AN ORGANIZED HEALTH CARE EDUCATION/TRAINING PROGRAM

## 2024-12-18 PROCEDURE — 85049 AUTOMATED PLATELET COUNT: CPT | Performed by: STUDENT IN AN ORGANIZED HEALTH CARE EDUCATION/TRAINING PROGRAM

## 2024-12-18 PROCEDURE — 85004 AUTOMATED DIFF WBC COUNT: CPT | Performed by: STUDENT IN AN ORGANIZED HEALTH CARE EDUCATION/TRAINING PROGRAM

## 2024-12-18 PROCEDURE — 250N000012 HC RX MED GY IP 250 OP 636 PS 637: Performed by: INTERNAL MEDICINE

## 2024-12-18 PROCEDURE — 74018 RADEX ABDOMEN 1 VIEW: CPT | Mod: 26 | Performed by: RADIOLOGY

## 2024-12-18 RX ORDER — SODIUM CHLORIDE, SODIUM LACTATE, POTASSIUM CHLORIDE, CALCIUM CHLORIDE 600; 310; 30; 20 MG/100ML; MG/100ML; MG/100ML; MG/100ML
INJECTION, SOLUTION INTRAVENOUS CONTINUOUS
Status: DISCONTINUED | OUTPATIENT
Start: 2024-12-18 | End: 2024-12-19 | Stop reason: HOSPADM

## 2024-12-18 RX ORDER — ALBUTEROL SULFATE 90 UG/1
2 INHALANT RESPIRATORY (INHALATION) EVERY 6 HOURS PRN
Status: DISCONTINUED | OUTPATIENT
Start: 2024-12-18 | End: 2024-12-19 | Stop reason: HOSPADM

## 2024-12-18 RX ADMIN — HYDROMORPHONE HYDROCHLORIDE 0.4 MG: 0.2 INJECTION, SOLUTION INTRAMUSCULAR; INTRAVENOUS; SUBCUTANEOUS at 11:48

## 2024-12-18 RX ADMIN — WATER 150 ML: 100 IRRIGANT IRRIGATION at 11:18

## 2024-12-18 RX ADMIN — INSULIN ASPART 2 UNITS: 100 INJECTION, SOLUTION INTRAVENOUS; SUBCUTANEOUS at 08:06

## 2024-12-18 RX ADMIN — HYDROMORPHONE HYDROCHLORIDE 0.4 MG: 0.2 INJECTION, SOLUTION INTRAMUSCULAR; INTRAVENOUS; SUBCUTANEOUS at 00:05

## 2024-12-18 RX ADMIN — HYDROMORPHONE HYDROCHLORIDE 0.4 MG: 0.2 INJECTION, SOLUTION INTRAMUSCULAR; INTRAVENOUS; SUBCUTANEOUS at 17:45

## 2024-12-18 RX ADMIN — HYDROMORPHONE HYDROCHLORIDE 0.4 MG: 0.2 INJECTION, SOLUTION INTRAMUSCULAR; INTRAVENOUS; SUBCUTANEOUS at 23:53

## 2024-12-18 RX ADMIN — INSULIN ASPART 2 UNITS: 100 INJECTION, SOLUTION INTRAVENOUS; SUBCUTANEOUS at 06:45

## 2024-12-18 RX ADMIN — INSULIN ASPART 2 UNITS: 100 INJECTION, SOLUTION INTRAVENOUS; SUBCUTANEOUS at 11:48

## 2024-12-18 RX ADMIN — SODIUM CHLORIDE, POTASSIUM CHLORIDE, SODIUM LACTATE AND CALCIUM CHLORIDE: 600; 310; 30; 20 INJECTION, SOLUTION INTRAVENOUS at 23:53

## 2024-12-18 RX ADMIN — INSULIN GLARGINE 10 UNITS: 100 INJECTION, SOLUTION SUBCUTANEOUS at 23:00

## 2024-12-18 RX ADMIN — SODIUM CHLORIDE, POTASSIUM CHLORIDE, SODIUM LACTATE AND CALCIUM CHLORIDE: 600; 310; 30; 20 INJECTION, SOLUTION INTRAVENOUS at 14:03

## 2024-12-18 RX ADMIN — INSULIN ASPART 1 UNITS: 100 INJECTION, SOLUTION INTRAVENOUS; SUBCUTANEOUS at 01:10

## 2024-12-18 ASSESSMENT — ACTIVITIES OF DAILY LIVING (ADL)
ADLS_ACUITY_SCORE: 56

## 2024-12-18 NOTE — PLAN OF CARE
Shift: 4219-5197  VS: Blood pressure 117/65, pulse 80, temperature 98  F (36.7  C), temperature source Oral, resp. rate 18, SpO2 98%.  Pain: reported pain once on shift, IV dilaudid given. Effective  Neuro: A x O 4. PERRLA. Calls appropriately and makes needs known  Cardiac: WDL. No cardiac related chest pain   Respiratory: WDL. Denies SOB. O2 sat > 94% on RA  GI/Diet/Appetite:  Denies N/V. NPO. NG tube low int suction. Output on shift, 200 mL  : voids spontaneously   LDA's: LPIV SL  Skin: intact  Activity: up ad lexi  Tests/Procedures: n/A  Pertinent Labs/Lab Collection: BG ACHS, routine lab draws     Plan: pt was given 10 units of  insulin from previous shift, provider aware and concerned for hypoglycemia. Changed orders to BG checks q hour due to pt being NPO. BG trending upwards, provider aware

## 2024-12-18 NOTE — PLAN OF CARE
"Plan of Care Reviewed With: patient    Overall Patient Progress: improving    Outcome Evaluation: AO x4. NPO, gastrografin initiated - xray scheduled around 1930. NG in place to LIS. BG monitored. Fluids initiated. VSS - RA.    RN [id number]    Vitals: Afebrile. VSS on RA.  Neuro: A&Ox4.   Mobility: Pt moves independently. Call light in reach.   Pain/Nausea: Pain treated with IV dilaudid x2. Denies nausea.   Diet: NPO. NG tube placed - LIS.   Labs: Reviewed.   LDAs: R PIV - infusing LR at 100 mL/hr.   Skin/incisions: WNL, no new deficits.   Respiratory: No acute changes this shift. Lung sounds clear, no SOB noted.   Cardiac: No acute changes this shift.  /GI: Voiding appropriately and spontaneously.  LBM- 12/18, passing flatus.    NEW CHANGES: Gastrografin challenge initiated, Xray scheduled around 1930. Continuous fluids initiated.     Continue to monitor patient s NG, pain, BG and intervene as needed. Continue to implement Plan of Care. Notify MD with any concerns or changes in patient status.     Vital signs:  Temp: 98  F (36.7  C) Temp src: Oral BP: 124/77 Pulse: 77   Resp: 16 SpO2: 100 % O2 Device: None (Room air)        Estimated body mass index is 27.29 kg/m  as calculated from the following:    Height as of 11/14/24: 1.626 m (5' 4\").    Weight as of 12/12/24: 72.1 kg (159 lb).    Shaylee Padron RN    "

## 2024-12-18 NOTE — PROGRESS NOTES
Serial abdominal exam    NGT with 800 ml of clear, non-bilious output since placement. Patient sitting comfortably in bed. States pain is resolved.     Vitals:    12/17/24 2115 12/17/24 2120 12/17/24 2130 12/17/24 2358   BP:    129/80   BP Location:    Right arm   Patient Position:    Semi-Post's   Cuff Size:    Adult Regular   Pulse:    80   Resp:    18   Temp:    97.7  F (36.5  C)   TempSrc:    Oral   SpO2: 98% 100% 99% 99%        NAD  Abd: soft, non-distended, non-tender, no rebound or guarding    No acute concerns. Will reassess on morning rounds.     Momo Barcenas, MD  General Surgery PGY-2   Pager: [phone]

## 2024-12-18 NOTE — DISCHARGE SUMMARY
[facility]  Hospitalist Discharge Summary      Date of Admission:  12/17/2024  Date of Discharge:  12/19/2024  Discharging Provider: Adam De La Cruz MD  Discharge Service: Hospitalist Service, GOLD TEAM 12    Discharge Diagnoses   Small bowel obstruction    Clinically Significant Risk Factors     # DMII: A1C = 10.5 % (Ref range: <5.7 %) within past 6 months       Follow-ups Needed After Discharge   Follow-up Appointments       Adult [email or web address] Follow-up and recommended labs and tests      Follow up with primary care provider within 7 days for hospital follow- up.  No follow up labs or test are needed.      Appointments on [place] and/or [facility] (with [email or web address] or [facility] provider or service). Call [phone] if you haven't heard regarding these appointments within 7 days of discharge.        Follow Up and recommended labs and tests      Follow up with Nursing home physician.  No follow up labs or test are needed.                Discharge Disposition   Discharged to rehabilitation facility  Condition at discharge: Stable    Hospital Course   Andrew Collier is a 59 year old man with history of HIV on Biktarvy , DM2 , methamphetamine use, multiple admissions with SBO of unknown cause (most recent on 11/14/24) managed non surgically, who presented with abdominal pain and found to have SBO.     Small bowel obstruction  Has history of recurrent SBOs, most recent on 11/14/24 managed non surgically. Presented with abdominal pain in LLQ radiating to the back. No nausea and vomiting.  CT Abd pelvis W contrast showed small bowel obstruction with a dilated bowel loop measuring up to 4.2 cm with suspicion of developing closed loop obstruction, similar to previous admission.     General surgery consulted and recommended conservative management. He was made NPO and NG tube placed and run on low intermittent suction 12/17. He was maintained on MIVF. His symptoms quickly  cleared. By 12/18 surgery felt that gastrograffin challenge was indicated. Contrast moved all the way to the rectum with no evidence of ongoing obstruction. He was started on clear liquid diet and tolerated well and was cleared for regular diet and discharge by surgery on 12/19.     Diabetes mellitus type 2   Hyperglycemia   BG elevated to 444 on admission. No acidosis on VBG. Home insulin regimen is 25 units glargine BID and aspart 10 units TID with meals. Glucose on sliding scale has remained in 200-300 range despite being NPO. Started low dose glargine with reasonable control.   - Resume PTA regimen on discharge.   - Glucose checks per protocol       # Chronic conditions without changes  # HIV: Continue PTA Biktarvy  # History of asthma: resume PTA inhalers      Consultations This Hospital Stay   SURGERY GENERAL ADULT IP CONSULT    Code Status   Full Code    Time Spent on this Encounter   I, Adam De La Cruz MD, personally saw the patient today and spent greater than 30 minutes discharging this patient.       Adam De La Cruz MD  [facility] EMERGENCY DEPARTMENT  [address] [facility] [address]  Phone: [phone]  ______________________________________________________________________    Physical Exam   Vital Signs: Temp: 97.4  F (36.3  C) Temp src: Oral BP: 122/81 Pulse: 77   Resp: 16 SpO2: 98 % O2 Device: None (Room air)    Weight: 0 lbs 0 oz  Gen: Awake, alert, NAD  ENT: MMM  CV: RRR, no MRG, no LE edema  Resp: breathing comfortably, CTAB, non-labored  GI: Soft, NT, ND, +BS       Primary Care Physician   Physician No Ref-Primary    Discharge Orders      Reason for your hospital stay    You were admitted to the hospital for small bowel obstruction     Activity    Your activity upon discharge: activity as tolerated     Adult [facility] Follow-up and recommended labs and tests    Follow up with primary care provider within 7 days for hospital follow- up.  No follow up labs or test are needed.       Appointments on [place] and/or [facility] (with [facility] or [facility] provider or service). Call [phone] if you haven't heard regarding these appointments within 7 days of discharge.     General info for SNF    Length of Stay Estimate: Short Term Care: Estimated # of Days <30  Condition at Discharge: Stable  Level of care:skilled   Rehabilitation Potential: Good  Admission H&P remains valid and up-to-date: Yes  Recent Chemotherapy: N/A  Use Nursing Home Standing Orders: Yes     Mantoux instructions    Give two-step Mantoux (PPD) Per Facility Policy Yes     Follow Up and recommended labs and tests    Follow up with Nursing home physician.  No follow up labs or test are needed.     Glucose monitor nursing POCT    Before meals and at bedtime     Intake and output    Every shift     Activity - Up ad lexi     Physical Therapy Adult Consult    Evaluate and treat as clinically indicated.    Reason:  deconditioning     Occupational Therapy Adult Consult    Evaluate and treat as clinically indicated.    Reason:  deconditioning     Contact Isolation     Diet    Follow this diet upon discharge: regular       Significant Results and Procedures   Results for orders placed or performed during the hospital encounter of 12/17/24   CT Abdomen Pelvis w Contrast    Narrative    EXAMINATION: CT ABDOMEN PELVIS W CONTRAST, 12/17/2024 4:51 PM    INDICATION: Left-sided abdominal pain- eval for SBO    COMPARISON STUDY: CT abdomen and pelvis dated 11/21/2024 and multiple  priors.    TECHNIQUE: CT scan of the abdomen and pelvis was performed on  multidetector CT scanner using volumetric acquisition technique and  images were reconstructed in multiple planes with variable thickness  and reviewed on dedicated workstations.     CONTRAST: iopamidol (ISOVUE-370) solution 97 mL injected IV without  oral contrast    CT scan radiation dose is optimized to minimum requisite dose using  automated dose modulation techniques.    FINDINGS:    Lower  thorax: Normal.  Mild bibasilar atelectasis    Liver: No mass. No intrahepatic biliary ductal dilation.    Biliary System: Cholelithiasis without evidence of cholecystitis. No  extrahepatic biliary ductal dilation.    Pancreas: No mass or pancreatic ductal dilation.    Adrenal glands: No mass or nodules    Spleen: Normal.    Kidneys: No suspicious mass, obstructing calculus or hydronephrosis.   Focal cortical hypodensities, too small to characterize and  statistically favored to represent simple cysts.    Gastrointestinal tract : There is a dilated loop of small bowel in the  left hemiabdomen measuring up to 4.2 cm, with 2 apparent transition  points (series 4, image 296, image 231); as seen on prior CT exams.  Remainder of the small bowel is normal in caliber. Moderate colonic  stool burden noted.    Mesentery/peritoneum/retroperitoneum: No mass. No free fluid or air.    Lymph nodes: No significant lymphadenopathy.    Vasculature: Patent major abdominal vasculature.    Pelvis: Urinary bladder shows concentric wall thickening.   Prostatomegaly, prostate measures 5.5 mm in transverse dimension.  Stable appearance of the penile implant with deflated reservoir in the  pelvis.    Osseous structures: No aggressive or acute osseous lesion.  Multilevel  degenerative changes of the visualized spine.      Soft tissues: Small fat containing ventral/umbilical hernia.   Nonspecific focal subcutaneous nodularity in the ventral abdominal  wall likely represents sequelae of medication injections.      Impression    IMPRESSION:   1. Small bowel obstruction centered about the left hemiabdomen with a  dilated bowel loop measuring up to 4.2 cm. As seen on prior CT, the  involved small bowel segment demonstrates two apparent transition  points suspicious for developing closed loop obstruction. No evidence  of pneumatosis.  2. Concentric urinary bladder wall thickening likely represents  changes of chronic partial outlet  obstruction.  3. Cholelithiasis without acute cholecystitis.    I have personally reviewed the examination and initial interpretation  and I agree with the findings.    BORIS PAYTON MD         SYSTEM ID:  [id number]   XR Abdomen Port 1 View    Narrative    EXAM: XR ABDOMEN PORT 1 VIEW  LOCATION: [facility]  DATE: 12/17/2024    INDICATION: Confirm NG tube placement.  COMPARISON: CT 12/17/2024      Impression    IMPRESSION: There is gastric tube tip and side port within decompressed gastric lumen. Proximal small bowel loops are less dilated.   XR Gastrografin Challenge    Narrative    EXAMINATION: XR GASTROGRAFIN CHALLENGE 12/18/2024 7:24 PM     COMPARISON: 12/17/2024    HISTORY: Small Bowel Obstruction    TECHNIQUE: Supine frontal views of the abdomen.    FINDINGS: Contrast outlining the ascending, transverse, descending,  sigmoid colon, and rectum. No evidence of obstruction or perforation.  NG tube with tip and sidehole in the stomach. No abnormally dilated  loops of bowel. No pneumatosis or portal venous gas. No definite  pneumoperitoneum. Lung bases are clear.      Impression    IMPRESSION:  1. Contrast in the colon extending to the rectum. No evidence of  high-grade small bowel obstruction.  2. NG tube with sidehole and tip in the stomach.    I have personally reviewed the examination and initial interpretation  and I agree with the findings.    JOSE VITAL DO         SYSTEM ID:  [id number]     *Note: Due to a large number of results and/or encounters for the requested time period, some results have not been displayed. A complete set of results can be found in Results Review.       Discharge Medications   Current Discharge Medication List        CONTINUE these medications which have NOT CHANGED    Details   !! albuterol (PROAIR HFA/PROVENTIL HFA/VENTOLIN HFA) 108 (90 Base) MCG/ACT inhaler Inhale 2 puffs into the lungs every 6 hours as needed for shortness of  breath, wheezing or cough.  Qty: 18 g, Refills: 0    Comments: Pharmacy may dispense brand covered by insurance (Proair, or proventil or ventolin or generic albuterol inhaler)      !! albuterol (PROAIR HFA/PROVENTIL HFA/VENTOLIN HFA) 108 (90 Base) MCG/ACT inhaler Inhale 2 puffs into the lungs every 6 hours as needed for shortness of breath, wheezing or cough.  Qty: 18 g, Refills: 0    Comments: Pharmacy may dispense brand covered by insurance (Proair, or proventil or ventolin or generic albuterol inhaler)      bictegravir-emtricitabine-tenofovir (BIKTARVY) -25 MG per tablet Take 1 tablet by mouth daily.  Qty: 30 tablet, Refills: 2    Associated Diagnoses: Human immunodeficiency virus I infection (H)      blood glucose (NO BRAND SPECIFIED) lancets standard Use to test blood sugar 1 time daily or as directed.  Qty: 100 Lancet, Refills: 4    Associated Diagnoses: Type 2 diabetes mellitus with hyperglycemia, with long-term current use of insulin (H)      blood glucose (NO BRAND SPECIFIED) test strip Use to test blood sugar 1 time daily or as directed.  Qty: 100 strip, Refills: 4    Associated Diagnoses: Type 2 diabetes mellitus with hyperglycemia, with long-term current use of insulin (H)      blood glucose monitoring (NO BRAND SPECIFIED) meter device kit Use to test blood sugar 3 times daily or as directed.  Qty: 1 kit, Refills: 0    Associated Diagnoses: Type 2 diabetes mellitus with hyperglycemia, with long-term current use of insulin (H)      !! Continuous Glucose Sensor (DEXCOM G6 SENSOR) MISC 1 each every 10 days Change every 10 days.  Qty: 3 each, Refills: 11    Associated Diagnoses: Type 2 diabetes mellitus with hyperglycemia, with long-term current use of insulin (H)      !! Continuous Glucose Sensor (DEXCOM G7 SENSOR) MISC 1 Device every 10 days.  Qty: 9 each, Refills: 3    Associated Diagnoses: Type 2 diabetes mellitus with hyperglycemia, with long-term current use of insulin (H)      Continuous Glucose  Transmitter (DEXCOM G6 TRANSMITTER) MISC 1 each every 3 months Change every 3 months.  Qty: 1 each, Refills: 3    Associated Diagnoses: Type 2 diabetes mellitus with hyperglycemia, with long-term current use of insulin (H)      insulin aspart (NOVOLOG PEN) 100 UNIT/ML pen Inject 10 Units subcutaneously 3 times daily (with meals). Take only when you have access to a meal.  Qty: 15 mL, Refills: 3    Associated Diagnoses: Type 2 diabetes mellitus with other specified complication, with long-term current use of insulin (H)      insulin glargine (LANTUS PEN) 100 UNIT/ML pen Inject 25 Units subcutaneously every morning. REDUCE TO 15 UNITS IF YOU THINK YOU WILL NOT HAVE FOOD THAT DAY.  Qty: 15 mL, Refills: 3    Comments: If Lantus is not covered by insurance, may substitute Basaglar or Semglee or other insulin glargine product per insurance preference at same dose and frequency.    Associated Diagnoses: Type 2 diabetes mellitus with other specified complication, with long-term current use of insulin (H)      insulin pen needle (32G X 4 MM) 32G X 4 MM miscellaneous Use 4-5 pen needles daily or as directed.  Qty: 100 each, Refills: 2    Associated Diagnoses: Type 2 diabetes mellitus with hyperglycemia, with long-term current use of insulin (H)      naproxen (NAPROSYN) 500 MG tablet Take 1 tablet (500 mg) by mouth 2 times daily as needed for moderate pain. Take with meals.  Qty: 30 tablet, Refills: 0      Nutritional Supplements (ENSURE ACTIVE) LIQD Take 414 mLs by mouth daily  Qty: 30 mL, Refills: 11    Associated Diagnoses: Type 2 diabetes mellitus with hyperglycemia, with long-term current use of insulin (H); Human immunodeficiency virus I infection (H)      pantoprazole (PROTONIX) 40 MG EC tablet Take 1 tablet (40 mg) by mouth 2 times daily (before meals).  Qty: 60 tablet, Refills: 0    Associated Diagnoses: Gastroesophageal reflux disease with esophagitis, unspecified whether hemorrhage      polymixin b-trimethoprim  "(POLYTRIM) 51447-4.1 UNIT/ML-% ophthalmic solution Place 2 drops Into the left eye every 2 hours (while awake).  Qty: 10 mL, Refills: 0       !! - Potential duplicate medications found. Please discuss with provider.        STOP taking these medications       azithromycin (ZITHROMAX Z-OLIVERIO) 250 MG tablet Comments:   Reason for Stopping:             Allergies   Allergies   Allergen Reactions    Fentanyl Blisters     Per pt, after taking this medication had blisters develope    Tylenol [Acetaminophen] Itching    Dulaglutide Rash    Insulin Lispro Rash     Patient reported    Penicillin V Other (See Comments) and Rash     Diffuse maculopapular rash + feels \"high\", per pt.      "

## 2024-12-18 NOTE — PROGRESS NOTES
Surgery Progress Note  12/18/2024       Subjective:  Pain improved this AM. Denies nausea, vomiting and bowel movement. Passing flatus.     Objective:  Temp:  [97.7  F (36.5  C)-98.6  F (37  C)] 98  F (36.7  C)  Pulse:  [80-95] 80  Resp:  [18] 18  BP: (117-177)/() 117/65  SpO2:  [97 %-100 %] 98 %    I/O  NGT output: 800     Gen: Awake, alert, NAD  Resp: NLB on RA  Abd: soft, nondistended, LUQ tenderness  Ext: WWP, no edema     Labs:  Recent Labs   Lab 12/17/24  1516   WBC 6.9   HGB 13.9          Recent Labs   Lab 12/18/24  0635 12/18/24  0520 12/18/24  0418 12/17/24  2049 12/17/24  1516 12/17/24  1510 12/12/24  0738   NA  --   --   --   --  133*  --  131*   POTASSIUM  --   --   --   --  4.2  --  4.3   CHLORIDE  --   --   --   --  98  --  97*   CO2  --   --   --   --  24  --  21*   BUN  --   --   --   --  14.3  --  20.8   CR  --   --   --   --  0.86  --  0.85   * 259* 235*   < > 444*   < > 491*   LINDA  --   --   --   --  9.6  --  9.5    < > = values in this interval not displayed.       Imaging:  No new imaging     Assessment/Plan:   59 year old male with medical history of recurrent SBO, poorly controlled T2DM, HIV on ART who presents with one day of left sided abdominal pain concerning for recurrent small bowel obstruction. CT imaging with known small bowel transition points suspicious for  developing closed loop obstruction s/p NGT placement.     Pain is improved this morning and he continues to be afebrile, without leukocytosis, non-tachycardic and having high NGT output. Abdominal exam is reassuring.     - Continue NPO for now   - Gastrografin challenge - contrast given at 11:18 am. Will follow up AXR. If he passes, anticipate resumption of CLD    - - - - - - - - - - - - - - - - - -  Patient seen, examined and discussed with chief resident, who will discuss with staff surgeon.     Av Cormier MD  Urology, PGY-1 on EGS Service

## 2024-12-18 NOTE — H&P
[facility]    History and Physical - Medicine Service, [facility] TEAM        Date of Admission:  12/17/2024    Assessment & Plan      Andrew Collier is a 59 year old male admitted on 12/17/2024. He has a history of HIV on Biktarvy , DM2 , methamphetamine use , multiple admissions with SBO of unknown cause ( most recent on 11/14/24) managed non surgically , presented with complaints of abdominal pain , found to have SBO     #SBO   Has history of recurrent SBOs , most recent on 11/14/24 managed non surgically. Presented with abdominal pain in LLQ radiating to the back. No nausea , vomiting . Last BM and meal yesterday night . Hemodynamically stable , WBC 6.9 , lipase 68 , LFTs WNL , UA shows no evidence of infection. CT Abd pelvis W contrast shows small bowel obstruction with a dilated bowel  loop measuring up to 4.2 cm with suspicion of developing closed loop obstruction , similar to admission before.  -NPO   -Nasogastric tube inserted  -Consulted surgery , appreciate recs   Will assess for GGC tomorrow AM   -IV dilaudid 0.4mg q6H for pain    #Diabetes mellitus type 2   #Hyperglycemia   Coming in , BG elevated to 444. VBG does not show acidosis . Per patient , he takes his home insulin regularly which is 27U glargine BID and aspart 10U TID with meals . Last insulin he took was glargine this morning .Repeat BG at 9pm is 341.   -Gave one time  10U aspart and 5U glargine    -LDSSI   -Consider adding glargine based on BG ( patient NPO)       # Chronic conditions without changes  # HIV: Continue PTA Biktarvy  # History of asthma: Albuterol inhaler as needed         Diet: NPO for Medical/Clinical Reasons Except for: No Exceptions    DVT Prophylaxis: Low Risk/Ambulatory with no VTE prophylaxis indicated  Potts Catheter: Not present  Lines: None     Cardiac Monitoring: None  Code Status: Full Code      Clinically Significant Risk Factors Present on Admission         #  Hyponatremia: Lowest Na = 133 mmol/L in last 2 days, will monitor as appropriate                    # DMII: A1C = 10.5 % (Ref range: <5.7 %) within past 6 months       # Financial/Environmental Concerns:    # Support System: poor social support noted in nursing assessment  # Housing Instability: noted in nursing assessment         Disposition Plan     Medically Ready for Discharge: Anticipated in 2-4 Days         The patient's care was discussed with the Attending Physician, Dr. Rocha .    Travis Varela MD  Medicine Service, [facility]  Securely message with [employer] (more info)  Text page via [facility] Paging/Directory   See signed in provider for up to date coverage information    ______________________________________________________________________    Chief Complaint   Abdominal pain     History is obtained from the patient    History of Present Illness   Andrew Collier is a 59 year old male admitted on 12/17/2024. He has a history of HIV on Biktarvy , DM2 , methamphetamine use , multiple admissions with SBO of uknown cause ( most recent on on 11/14/24) , presented with complaints of abdominal pain . Pain started at 2 in the morning . Located in left lower quadrant and has been radiating to the back . No nausea / vomiting . Last BM was yesterday night . Patient said that this feels very similar to his previous episodes of SBO. Says that these episodes started when he started taking ozempic 8 years ago . History of appendectomy 5 years ago. Last meal was dinner last night but drank some coke at 1pm today. No fever , chills , dysuria , headache , shortness of breath  , chest pain . Coming in , WBC 6.9 , HB 13.9 , UA with no evidence of infection , shows glucose . LFTs normal , lipase slightly elevated to 68 . VBG normal. CT Abd with contrast shows     Past Medical History    Past Medical History:   Diagnosis Date    Acute appendicitis with localized  peritonitis 1/31/2018    AIDS (H)     Allergic rhinitis due to other allergen     DNS    Anal dysplasia     Chronic abdominal pain     CNS toxoplasmosis (H)     COVID-19 1/11/2022    Diabetes type 2, controlled (H)     GERD (gastroesophageal reflux disease)     Herpes zoster 9/23/2016    History of seizure     History of substance abuse (H)     HIV (human immunodeficiency virus infection) (H)     HLD (hyperlipidemia)     Lung nodules     Periungual wart     Pneumonia 1/6/2019    PTSD (post-traumatic stress disorder)     Sleep apnea     doesn't use CPAP       Past Surgical History   Past Surgical History:   Procedure Laterality Date    ANOSCOPY N/A 9/9/2020    Procedure: Exam Under Anesthesia, ANOSCOPY, fulgeration of rectal fissures with Rectal Biopsies;  Surgeon: Thanh Lundberg MD;  Location: [facility] OR    COLONOSCOPY Left 1/22/2016    Procedure: COMBINED COLONOSCOPY, SINGLE OR MULTIPLE BIOPSY/POLYPECTOMY BY BIOPSY;  Surgeon: Clark Saini MD;  Location: [facility] GI    ESOPHAGOSCOPY, GASTROSCOPY, DUODENOSCOPY (EGD), COMBINED N/A 6/6/2022    Procedure: ESOPHAGOGASTRODUODENOSCOPY, WITH BIOPSY;  Surgeon: Kecia Benítez MD;  Location: [facility]    ESOPHAGOSCOPY, GASTROSCOPY, DUODENOSCOPY (EGD), COMBINED N/A 10/10/2024    Procedure: Esophagoscopy, gastroscopy, duodenoscopy (EGD), combined;  Surgeon: Nico Bhatti DO;  Location: [facility]    HC EXPLORE UNDESC TESTIS,INGUIN/SCROTAL      LAPAROSCOPIC APPENDECTOMY N/A 1/31/2018    Procedure: LAPAROSCOPIC APPENDECTOMY;  LAPAROSCOPIC APPENDECTOMY;  Surgeon: Dawn Holt MD;  Location: [facility]    LAPAROSCOPY DIAGNOSTIC (GENERAL) N/A 7/26/2016    Procedure: LAPAROSCOPY DIAGNOSTIC (GENERAL);  Surgeon: Susannah Arriaga MD;  Location: [facility]    LAPAROSCOPY DIAGNOSTIC (GENERAL) N/A 4/16/2018    Procedure: LAPAROSCOPY DIAGNOSTIC (GENERAL);  Diagnostic laparoscopy and lysis of adhesions;  Surgeon: Prince Dowling MD;  Location: [facility] OR    OPTICAL TRACKING SYSTEM  CRANIOTOMY, EXCISE TUMOR, COMBINED Left 4/10/2015    Procedure: COMBINED OPTICAL TRACKING SYSTEM CRANIOTOMY, EXCISE TUMOR;  Surgeon: Mirlande Colmenares MD;  Location: [facility] OR    REPAIR GAMEKEEPER'S THUMB Right 2016    Procedure: REPAIR LIGAMENT ULNAR COLLATERAL THUMB (GAMEKEEPER'S);  Surgeon: Evin Zamorano MD;  Location:  OR    [facility] NONSPECIFIC PROCEDURE      right forearm fracture       Prior to Admission Medications   Prior to Admission Medications   Prescriptions Last Dose Informant Patient Reported? Taking?   Continuous Glucose Sensor (DEXCOM G6 SENSOR) MISC   No No   Si each every 10 days Change every 10 days.   Continuous Glucose Sensor (DEXCOM G7 SENSOR) MISC   No No   Si Device every 10 days.   Continuous Glucose Transmitter (DEXCOM G6 TRANSMITTER) MISC   No No   Si each every 3 months Change every 3 months.   Nutritional Supplements (ENSURE ACTIVE) LIQD   No No   Sig: Take 414 mLs by mouth daily   albuterol (PROAIR HFA/PROVENTIL HFA/VENTOLIN HFA) 108 (90 Base) MCG/ACT inhaler   No No   Sig: Inhale 2 puffs into the lungs every 6 hours as needed for shortness of breath, wheezing or cough.   albuterol (PROAIR HFA/PROVENTIL HFA/VENTOLIN HFA) 108 (90 Base) MCG/ACT inhaler   No No   Sig: Inhale 2 puffs into the lungs every 6 hours as needed for shortness of breath, wheezing or cough.   azithromycin (ZITHROMAX Z-OLIVERIO) 250 MG tablet   No No   Sig: Take as directed for sinus infection and cough   bictegravir-emtricitabine-tenofovir (BIKTARVY) -25 MG per tablet   No No   Sig: Take 1 tablet by mouth daily.   blood glucose (NO BRAND SPECIFIED) lancets standard   No No   Sig: Use to test blood sugar 1 time daily or as directed.   blood glucose (NO BRAND SPECIFIED) test strip   No No   Sig: Use to test blood sugar 1 time daily or as directed.   blood glucose monitoring (NO BRAND SPECIFIED) meter device kit   No No   Sig: Use to test blood sugar 3 times daily or as directed.   insulin aspart  (NOVOLOG PEN) 100 UNIT/ML pen   No No   Sig: Inject 10 Units subcutaneously 3 times daily (with meals). Take only when you have access to a meal.   insulin glargine (LANTUS PEN) 100 UNIT/ML pen   No No   Sig: Inject 25 Units subcutaneously every morning. REDUCE TO 15 UNITS IF YOU THINK YOU WILL NOT HAVE FOOD THAT DAY.   insulin pen needle (32G X 4 MM) 32G X 4 MM miscellaneous   No No   Sig: Use 4-5 pen needles daily or as directed.   naproxen (NAPROSYN) 500 MG tablet   No No   Sig: Take 1 tablet (500 mg) by mouth 2 times daily as needed for moderate pain. Take with meals.   pantoprazole (PROTONIX) 40 MG EC tablet   No No   Sig: Take 1 tablet (40 mg) by mouth 2 times daily (before meals).   polymixin b-trimethoprim (POLYTRIM) 09200-9.1 UNIT/ML-% ophthalmic solution   No No   Sig: Place 2 drops Into the left eye every 2 hours (while awake).      Facility-Administered Medications: None          Physical Exam   Vital Signs: Temp: 98.3  F (36.8  C) Temp src: Oral BP: (!) 177/98 Pulse: 94   Resp: 18 SpO2: 99 % O2 Device: None (Room air)    Weight: 0 lbs 0 oz    General : In moderate pain , stable   CVS : S1S2 heard , no murmurs   RS: B/l NVBS heard   Abd : soft , tenderness present in LLQ , no rebound , rigidity   Extremity : No edema   CNS : Alert and oriented to time place and person . No FND     Medical Decision Making           Data     I have personally reviewed the following data over the past 24 hrs:    6.9  \   13.9   / 394     133 (L) 98 14.3 /  444 (H)   4.2 24 0.86 \     ALT: 14 AST: 19 AP: 141 TBILI: 0.8   ALB: 4.2 TOT PROTEIN: 7.7 LIPASE: 68 (H)     Procal: N/A CRP: N/A Lactic Acid: 1.4         Imaging results reviewed over the past 24 hrs:   Recent Results (from the past 24 hours)   CT Abdomen Pelvis w Contrast    Narrative    EXAMINATION: CT ABDOMEN PELVIS W CONTRAST, 12/17/2024 4:51 PM    INDICATION: Left-sided abdominal pain- eval for SBO    COMPARISON STUDY: CT abdomen and pelvis dated 11/21/2024 and  multiple  priors.    TECHNIQUE: CT scan of the abdomen and pelvis was performed on  multidetector CT scanner using volumetric acquisition technique and  images were reconstructed in multiple planes with variable thickness  and reviewed on dedicated workstations.     CONTRAST: iopamidol (ISOVUE-370) solution 97 mL injected IV without  oral contrast    CT scan radiation dose is optimized to minimum requisite dose using  automated dose modulation techniques.    FINDINGS:    Lower thorax: Normal.  Mild bibasilar atelectasis    Liver: No mass. No intrahepatic biliary ductal dilation.    Biliary System: Cholelithiasis without evidence of cholecystitis. No  extrahepatic biliary ductal dilation.    Pancreas: No mass or pancreatic ductal dilation.    Adrenal glands: No mass or nodules    Spleen: Normal.    Kidneys: No suspicious mass, obstructing calculus or hydronephrosis.   Focal cortical hypodensities, too small to characterize and  statistically favored to represent simple cysts.    Gastrointestinal tract : There is a dilated loop of small bowel in the  left hemiabdomen measuring up to 4.2 cm, with 2 apparent transition  points (series 4, image 296, image 231); as seen on prior CT exams.  Remainder of the small bowel is normal in caliber. Moderate colonic  stool burden noted.    Mesentery/peritoneum/retroperitoneum: No mass. No free fluid or air.    Lymph nodes: No significant lymphadenopathy.    Vasculature: Patent major abdominal vasculature.    Pelvis: Urinary bladder shows concentric wall thickening.   Prostatomegaly, prostate measures 5.5 mm in transverse dimension.  Stable appearance of the penile implant with deflated reservoir in the  pelvis.    Osseous structures: No aggressive or acute osseous lesion.  Multilevel  degenerative changes of the visualized spine.      Soft tissues: Small fat containing ventral/umbilical hernia.   Nonspecific focal subcutaneous nodularity in the ventral abdominal  wall likely  represents sequelae of medication injections.      Impression    IMPRESSION:   1. Small bowel obstruction centered about the left hemiabdomen with a  dilated bowel loop measuring up to 4.2 cm. As seen on prior CT, the  involved small bowel segment demonstrates two apparent transition  points suspicious for developing closed loop obstruction. No evidence  of pneumatosis.  2. Concentric urinary bladder wall thickening likely represents  changes of chronic partial outlet obstruction.  3. Cholelithiasis without acute cholecystitis.    I have personally reviewed the examination and initial interpretation  and I agree with the findings.    BORIS PAYTON MD         SYSTEM ID:  [id number]   XR Abdomen Port 1 View    Narrative    EXAM: XR ABDOMEN PORT 1 VIEW  LOCATION: [facility]  DATE: 12/17/2024    INDICATION: Confirm NG tube placement.  COMPARISON: CT 12/17/2024      Impression    IMPRESSION: There is gastric tube tip and side port within decompressed gastric lumen. Proximal small bowel loops are less dilated.
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
## 2024-12-18 NOTE — PROGRESS NOTES
[facility]    Medicine Progress Note - Hospitalist Service, GOLD TEAM 12    Date of Admission:  12/17/2024    Assessment & Plan    Andrew Collier is a 59 year old man with history of HIV on Biktarvy , DM2 , methamphetamine use, multiple admissions with SBO of unknown cause (most recent on 11/14/24) managed non surgically, who presented with abdominal pain and found to have SBO.     SBO   Has history of recurrent SBOs, most recent on 11/14/24 managed non surgically. Presented with abdominal pain in LLQ radiating to the back. No nausea and vomiting.  CT Abd pelvis W contrast showed small bowel obstruction with a dilated bowel loop measuring up to 4.2 cm with suspicion of developing closed loop obstruction, similar to previous admission.     General surgery consulted and recommended conservative management. NGT placed 12/17.   - NPO for now  - Trialing gastrograffin. Surgery will order and follow up AXR and may remove NG  - IV dilaudid 0.4mg q6H for pain   -MIVF with LR    Diabetes mellitus type 2   Hyperglycemia   BG elevated to 444 on admission. No acidosis on VBG. Home insulin regimen is 27U glargine BID and aspart 10U TID with meals. Glucose on sliding scale has remained in 200-300 range despite being NPO  - increase sliding scale insulin to high dose   -Glargine 10 units tonight. Will need to increase if cleared to advance diet      # Chronic conditions without changes  # HIV: Continue PTA Biktarvy  # History of asthma: Albuterol inhaler as needed            Diet: NPO for Medical/Clinical Reasons Except for: No Exceptions    DVT Prophylaxis: Pneumatic Compression Devices  Potts Catheter: Not present  Lines: None     Cardiac Monitoring: None  Code Status: Full Code      Clinically Significant Risk Factors Present on Admission         # Hyponatremia: Lowest Na = 133 mmol/L in last 2 days, will monitor as appropriate   # Hypocalcemia: Lowest Ca = 8.6 mg/dL in  last 2 days, will monitor and replace as appropriate                  # DMII: A1C = 10.5 % (Ref range: <5.7 %) within past 6 months       # Financial/Environmental Concerns:    # Support System: poor social support noted in nursing assessment  # Housing Instability: noted in nursing assessment         Social Drivers of Health    Food Insecurity: Low Risk  (11/13/2024)    Food Insecurity     Within the past 12 months, did you worry that your food would run out before you got money to buy more?: No     Within the past 12 months, did the food you bought just not last and you didn t have money to get more?: No   Recent Concern: Food Insecurity - High Risk (10/24/2024)    Food Insecurity     Within the past 12 months, did you worry that your food would run out before you got money to buy more?: Yes     Within the past 12 months, did the food you bought just not last and you didn t have money to get more?: Yes   Housing Stability: High Risk (11/13/2024)    Housing Stability     Do you have housing? : No     Are you worried about losing your housing?: Yes   Tobacco Use: High Risk (12/12/2024)    Patient History     Smoking Tobacco Use: Some Days     Smokeless Tobacco Use: Former   Financial Resource Strain: High Risk (11/13/2024)    Financial Resource Strain     Within the past 12 months, have you or your family members you live with been unable to get utilities (heat, electricity) when it was really needed?: Yes   Alcohol Use: Unknown (8/27/2020)    AUDIT-C     Average Number of Drinks: Patient declined   Transportation Needs: High Risk (11/13/2024)    Transportation Needs     Within the past 12 months, has lack of transportation kept you from medical appointments, getting your medicines, non-medical meetings or appointments, work, or from getting things that you need?: Yes   Interpersonal Safety: Low Risk  (11/13/2024)    Interpersonal Safety     Do you feel physically and emotionally safe where you currently live?: Yes      Within the past 12 months, have you been hit, slapped, kicked or otherwise physically hurt by someone?: No     Within the past 12 months, have you been humiliated or emotionally abused in other ways by your partner or ex-partner?: No   Recent Concern: Interpersonal Safety - High Risk (10/24/2024)    Interpersonal Safety     Do you feel physically and emotionally safe where you currently live?: No     Within the past 12 months, have you been hit, slapped, kicked or otherwise physically hurt by someone?: No     Within the past 12 months, have you been humiliated or emotionally abused in other ways by your partner or ex-partner?: No    [facility]    Social Connections          Disposition Plan     Medically Ready for Discharge: Anticipated in 2-4 Days             Adam De La Cruz MD  Hospitalist Service, GOLD TEAM [address] [place] [address]  Securely message with [employer] (more info)  Text page via [email or web address] Paging/Directory   See signed in provider for up to date coverage information  ______________________________________________________________________    Interval History   No acute events. Pain much improved and passing gas. Is eager to start eating.     Physical Exam   Vital Signs: Temp: 97.4  F (36.3  C) Temp src: Oral BP: 122/81 Pulse: 77   Resp: 16 SpO2: 98 % O2 Device: None (Room air)    Weight: 0 lbs 0 oz    Gen: Awake, alert, NAD  ENT: MMM  CV: RRR, no MRG, no LE edema  Resp: breathing comfortably, CTAB, non-labored  GI: Soft, NT, ND, +BS      Medical Decision Making       45 MINUTES SPENT BY ME on the date of service doing chart review, history, exam, documentation & further activities per the note.      Data

## 2024-12-19 VITALS
TEMPERATURE: 98.2 F | HEART RATE: 82 BPM | OXYGEN SATURATION: 99 % | DIASTOLIC BLOOD PRESSURE: 90 MMHG | SYSTOLIC BLOOD PRESSURE: 132 MMHG | RESPIRATION RATE: 16 BRPM

## 2024-12-19 LAB
GLUCOSE BLDC GLUCOMTR-MCNC: 274 MG/DL (ref 70–99)
GLUCOSE BLDC GLUCOMTR-MCNC: 323 MG/DL (ref 70–99)

## 2024-12-19 PROCEDURE — 82962 GLUCOSE BLOOD TEST: CPT

## 2024-12-19 PROCEDURE — 99239 HOSP IP/OBS DSCHRG MGMT >30: CPT | Performed by: INTERNAL MEDICINE

## 2024-12-19 RX ORDER — NALOXONE HYDROCHLORIDE 0.4 MG/ML
0.2 INJECTION, SOLUTION INTRAMUSCULAR; INTRAVENOUS; SUBCUTANEOUS
Status: DISCONTINUED | OUTPATIENT
Start: 2024-12-19 | End: 2024-12-19 | Stop reason: HOSPADM

## 2024-12-19 RX ORDER — NALOXONE HYDROCHLORIDE 0.4 MG/ML
0.4 INJECTION, SOLUTION INTRAMUSCULAR; INTRAVENOUS; SUBCUTANEOUS
Status: DISCONTINUED | OUTPATIENT
Start: 2024-12-19 | End: 2024-12-19 | Stop reason: HOSPADM

## 2024-12-19 ASSESSMENT — ACTIVITIES OF DAILY LIVING (ADL)
ADLS_ACUITY_SCORE: 56

## 2024-12-19 NOTE — PROGRESS NOTES
Reviewed post-gastrografin AXR with contrast throughout the colon. Remove NGT and advance diet as tolerated (ordered).     Raysa Valentine MD PGY5  [facility] General Surgery Resident

## 2024-12-19 NOTE — PROGRESS NOTES
Surgery Progress Note  12/19/2024       Subjective:  Tolerating diet after NGT removal. Denies nausea and vomiting. Having bowel movements and passing flatus.     Objective:  Temp:  [97.4  F (36.3  C)-98.2  F (36.8  C)] 98.2  F (36.8  C)  Pulse:  [77-82] 82  Resp:  [16] 16  BP: (122-132)/(77-90) 132/90  SpO2:  [98 %-100 %] 99 %    I/O  NGT output: 180 (now removed)  Uop: 950  Stool: 3x     Gen: Awake, alert, NAD  Resp: NLB on RA  Abd: soft, nondistended, nontender  Ext: WWP, no edema     Labs:  Recent Labs   Lab 12/18/24  0709 12/17/24  1516   WBC 6.8 6.9   HGB 12.3* 13.9    394       Recent Labs   Lab 12/19/24  0505 12/19/24  0140 12/18/24  2309 12/18/24  0805 12/18/24  0709 12/17/24  2049 12/17/24  1516 12/17/24  1510 12/12/24  0738   NA  --   --   --   --  136  --  133*  --  131*   POTASSIUM  --   --   --   --  4.0  --  4.2  --  4.3   CHLORIDE  --   --   --   --  103  --  98  --  97*   CO2  --   --   --   --  23  --  24  --  21*   BUN  --   --   --   --  15.1  --  14.3  --  20.8   CR  --   --   --   --  0.72  --  0.86  --  0.85   * 323* 254*   < > 285*   < > 444*   < > 491*   LINDA  --   --   --   --  8.6*  --  9.6  --  9.5    < > = values in this interval not displayed.       Imaging:  No new imaging     Assessment/Plan:   59 year old male with medical history of recurrent SBO, poorly controlled T2DM, HIV on ART who presents with one day of left sided abdominal pain concerning for recurrent small bowel obstruction. CT imaging with known small bowel transition points suspicious for developing closed loop obstruction s/p NGT placement and successful GGC.    Tolerated diet advancement and having anterograde bowel function. Abdominal exam continues to be reassuring.     - Continue regular diet    - General surgery will sign off. Please reach out with questions and concerns,    - - - - - - - - - - - - - - - - - -  Patient seen, examined and discussed with chief resident, who will discuss with staff  surgeon.     Av Cormier MD  Urology, PGY-1 on EGS Service

## 2024-12-19 NOTE — PROGRESS NOTES
Pt requested to speed up his discharge because he has appointment, provider notified, ordered placed, instructions provided, IV removed, pt left the ED at 0840, states he was going by taxis

## 2024-12-19 NOTE — PLAN OF CARE
Goal Outcome Evaluation:      Plan of Care Reviewed With: patient    Overall Patient Progress: improvingOverall Patient Progress: improving    Outcome Evaluation: AO x 4. pt requesting to go home before 10. Pain controlled on IV dilaudid overnight. BG monitored. VSS      Cares from: -    VS & Pain: Blood pressure (!) 132/90, pulse 82, temperature 98.2  F (36.8  C), temperature source Oral, resp. rate 16, SpO2 99%.  Neuro: intact  Respiratory: WDL  Cardiac: WDL  Peripheral neurovascular: WDL  GI/: discontinued NGT, advanced diet to regular, tolerating solids.  Nutrition: Reg diet  Skin: no new concerns  Musculoskeletal: WDL  Lines: PIV infusing   Activity: Independent  Labs/Electrolytes: reviewed,  blood glucose 200s-300s  Events this shift: None    Plan: Discharge planning orders in chart   complains of pain/discomfort

## 2024-12-20 ENCOUNTER — ANESTHESIA (OUTPATIENT)
Dept: SURGERY | Facility: CLINIC | Age: 59
End: 2024-12-20
Payer: COMMERCIAL

## 2024-12-20 ENCOUNTER — APPOINTMENT (OUTPATIENT)
Dept: CT IMAGING | Facility: CLINIC | Age: 59
End: 2024-12-20
Attending: FAMILY MEDICINE
Payer: COMMERCIAL

## 2024-12-20 ENCOUNTER — HOSPITAL ENCOUNTER (INPATIENT)
Facility: CLINIC | Age: 59
End: 2024-12-20
Attending: FAMILY MEDICINE
Payer: COMMERCIAL

## 2024-12-20 ENCOUNTER — ANESTHESIA EVENT (OUTPATIENT)
Dept: SURGERY | Facility: CLINIC | Age: 59
End: 2024-12-20
Payer: COMMERCIAL

## 2024-12-20 DIAGNOSIS — T81.30XA WOUND DEHISCENCE: ICD-10-CM

## 2024-12-20 DIAGNOSIS — T81.31XA POSTOPERATIVE WOUND DEHISCENCE: ICD-10-CM

## 2024-12-20 DIAGNOSIS — Z46.89 ENCOUNTER FOR MANAGEMENT OF WOUND VAC: ICD-10-CM

## 2024-12-20 DIAGNOSIS — K56.609 INTESTINAL OBSTRUCTION, UNSPECIFIED CAUSE, UNSPECIFIED WHETHER PARTIAL OR COMPLETE (H): ICD-10-CM

## 2024-12-20 DIAGNOSIS — R11.2 NAUSEA AND VOMITING, UNSPECIFIED VOMITING TYPE: ICD-10-CM

## 2024-12-20 DIAGNOSIS — K56.609 SMALL BOWEL OBSTRUCTION (H): ICD-10-CM

## 2024-12-20 DIAGNOSIS — R10.84 GENERALIZED ABDOMINAL PAIN: ICD-10-CM

## 2024-12-20 DIAGNOSIS — K91.89 SMALL BOWEL ANASTOMOTIC LEAK: Primary | ICD-10-CM

## 2024-12-20 LAB
ALBUMIN SERPL BCG-MCNC: 4.1 G/DL (ref 3.5–5.2)
ALBUMIN UR-MCNC: NEGATIVE MG/DL
ALP SERPL-CCNC: 132 U/L (ref 40–150)
ALT SERPL W P-5'-P-CCNC: 12 U/L (ref 0–70)
ANION GAP SERPL CALCULATED.3IONS-SCNC: 12 MMOL/L (ref 7–15)
APPEARANCE UR: CLEAR
APTT PPP: 28 SECONDS (ref 22–38)
AST SERPL W P-5'-P-CCNC: 23 U/L (ref 0–45)
ATRIAL RATE - MUSE: 88 BPM
B-OH-BUTYR SERPL-SCNC: <0.18 MMOL/L
BASOPHILS # BLD AUTO: 0.1 10E3/UL (ref 0–0.2)
BASOPHILS NFR BLD AUTO: 1 %
BILIRUB SERPL-MCNC: 0.6 MG/DL
BILIRUB UR QL STRIP: NEGATIVE
BUN SERPL-MCNC: 20.4 MG/DL (ref 8–23)
CALCIUM SERPL-MCNC: 10 MG/DL (ref 8.8–10.4)
CHLORIDE SERPL-SCNC: 98 MMOL/L (ref 98–107)
COLOR UR AUTO: ABNORMAL
CREAT SERPL-MCNC: 0.82 MG/DL (ref 0.67–1.17)
CRP SERPL-MCNC: <3 MG/L
DIASTOLIC BLOOD PRESSURE - MUSE: NORMAL MMHG
EGFRCR SERPLBLD CKD-EPI 2021: >90 ML/MIN/1.73M2
EOSINOPHIL # BLD AUTO: 0.2 10E3/UL (ref 0–0.7)
EOSINOPHIL NFR BLD AUTO: 3 %
ERYTHROCYTE [DISTWIDTH] IN BLOOD BY AUTOMATED COUNT: 13 % (ref 10–15)
GLUCOSE BLDC GLUCOMTR-MCNC: 254 MG/DL (ref 70–99)
GLUCOSE BLDC GLUCOMTR-MCNC: 288 MG/DL (ref 70–99)
GLUCOSE SERPL-MCNC: 392 MG/DL (ref 70–99)
GLUCOSE UR STRIP-MCNC: >=1000 MG/DL
HCO3 SERPL-SCNC: 23 MMOL/L (ref 22–29)
HCT VFR BLD AUTO: 39.6 % (ref 40–53)
HGB BLD-MCNC: 13.4 G/DL (ref 13.3–17.7)
HGB UR QL STRIP: NEGATIVE
IMM GRANULOCYTES # BLD: 0.1 10E3/UL
IMM GRANULOCYTES NFR BLD: 1 %
INR PPP: 0.97 (ref 0.85–1.15)
INTERPRETATION ECG - MUSE: NORMAL
KETONES UR STRIP-MCNC: NEGATIVE MG/DL
LACTATE SERPL-SCNC: 1.1 MMOL/L (ref 0.7–2)
LEUKOCYTE ESTERASE UR QL STRIP: NEGATIVE
LIPASE SERPL-CCNC: 57 U/L (ref 13–60)
LYMPHOCYTES # BLD AUTO: 3.1 10E3/UL (ref 0.8–5.3)
LYMPHOCYTES NFR BLD AUTO: 41 %
MCH RBC QN AUTO: 28.3 PG (ref 26.5–33)
MCHC RBC AUTO-ENTMCNC: 33.8 G/DL (ref 31.5–36.5)
MCV RBC AUTO: 84 FL (ref 78–100)
MONOCYTES # BLD AUTO: 0.5 10E3/UL (ref 0–1.3)
MONOCYTES NFR BLD AUTO: 7 %
MUCOUS THREADS #/AREA URNS LPF: PRESENT /LPF
NEUTROPHILS # BLD AUTO: 3.6 10E3/UL (ref 1.6–8.3)
NEUTROPHILS NFR BLD AUTO: 47 %
NITRATE UR QL: NEGATIVE
NRBC # BLD AUTO: 0 10E3/UL
NRBC BLD AUTO-RTO: 0 /100
P AXIS - MUSE: 51 DEGREES
PH UR STRIP: 5.5 [PH] (ref 5–7)
PLATELET # BLD AUTO: 435 10E3/UL (ref 150–450)
POTASSIUM SERPL-SCNC: 4.4 MMOL/L (ref 3.4–5.3)
PR INTERVAL - MUSE: 124 MS
PROT SERPL-MCNC: 7.6 G/DL (ref 6.4–8.3)
QRS DURATION - MUSE: 88 MS
QT - MUSE: 350 MS
QTC - MUSE: 423 MS
R AXIS - MUSE: -11 DEGREES
RBC # BLD AUTO: 4.74 10E6/UL (ref 4.4–5.9)
RBC URINE: <1 /HPF
SODIUM SERPL-SCNC: 133 MMOL/L (ref 135–145)
SP GR UR STRIP: 1.02 (ref 1–1.03)
SYSTOLIC BLOOD PRESSURE - MUSE: NORMAL MMHG
T AXIS - MUSE: 37 DEGREES
TROPONIN T SERPL HS-MCNC: 10 NG/L
TROPONIN T SERPL HS-MCNC: 11 NG/L
UROBILINOGEN UR STRIP-MCNC: NORMAL MG/DL
VENTRICULAR RATE- MUSE: 88 BPM
WBC # BLD AUTO: 7.5 10E3/UL (ref 4–11)
WBC URINE: <1 /HPF

## 2024-12-20 PROCEDURE — 250N000011 HC RX IP 250 OP 636

## 2024-12-20 PROCEDURE — 36415 COLL VENOUS BLD VENIPUNCTURE: CPT | Performed by: FAMILY MEDICINE

## 2024-12-20 PROCEDURE — 93010 ELECTROCARDIOGRAM REPORT: CPT | Performed by: FAMILY MEDICINE

## 2024-12-20 PROCEDURE — 250N000011 HC RX IP 250 OP 636: Performed by: FAMILY MEDICINE

## 2024-12-20 PROCEDURE — 84484 ASSAY OF TROPONIN QUANT: CPT | Performed by: FAMILY MEDICINE

## 2024-12-20 PROCEDURE — 74176 CT ABD & PELVIS W/O CONTRAST: CPT | Mod: 26 | Performed by: STUDENT IN AN ORGANIZED HEALTH CARE EDUCATION/TRAINING PROGRAM

## 2024-12-20 PROCEDURE — 370N000017 HC ANESTHESIA TECHNICAL FEE, PER MIN: Performed by: SURGERY

## 2024-12-20 PROCEDURE — 710N000011 HC RECOVERY PHASE 1, LEVEL 3, PER MIN: Performed by: SURGERY

## 2024-12-20 PROCEDURE — 81001 URINALYSIS AUTO W/SCOPE: CPT | Performed by: FAMILY MEDICINE

## 2024-12-20 PROCEDURE — 96361 HYDRATE IV INFUSION ADD-ON: CPT | Performed by: FAMILY MEDICINE

## 2024-12-20 PROCEDURE — 85730 THROMBOPLASTIN TIME PARTIAL: CPT | Performed by: FAMILY MEDICINE

## 2024-12-20 PROCEDURE — 83605 ASSAY OF LACTIC ACID: CPT | Performed by: FAMILY MEDICINE

## 2024-12-20 PROCEDURE — 258N000003 HC RX IP 258 OP 636

## 2024-12-20 PROCEDURE — 93005 ELECTROCARDIOGRAM TRACING: CPT | Performed by: FAMILY MEDICINE

## 2024-12-20 PROCEDURE — 44120 REMOVAL OF SMALL INTESTINE: CPT | Mod: GC | Performed by: SURGERY

## 2024-12-20 PROCEDURE — 999N000127 HC STATISTIC PERIPHERAL IV START W US GUIDANCE

## 2024-12-20 PROCEDURE — 272N000001 HC OR GENERAL SUPPLY STERILE: Performed by: SURGERY

## 2024-12-20 PROCEDURE — 82962 GLUCOSE BLOOD TEST: CPT

## 2024-12-20 PROCEDURE — 250N000025 HC SEVOFLURANE, PER MIN: Performed by: SURGERY

## 2024-12-20 PROCEDURE — 85041 AUTOMATED RBC COUNT: CPT | Performed by: FAMILY MEDICINE

## 2024-12-20 PROCEDURE — 74176 CT ABD & PELVIS W/O CONTRAST: CPT

## 2024-12-20 PROCEDURE — 83690 ASSAY OF LIPASE: CPT | Performed by: FAMILY MEDICINE

## 2024-12-20 PROCEDURE — 82010 KETONE BODYS QUAN: CPT | Performed by: STUDENT IN AN ORGANIZED HEALTH CARE EDUCATION/TRAINING PROGRAM

## 2024-12-20 PROCEDURE — 99285 EMERGENCY DEPT VISIT HI MDM: CPT | Mod: 25 | Performed by: FAMILY MEDICINE

## 2024-12-20 PROCEDURE — 88307 TISSUE EXAM BY PATHOLOGIST: CPT | Mod: 26 | Performed by: PATHOLOGY

## 2024-12-20 PROCEDURE — 0DBB0ZZ EXCISION OF ILEUM, OPEN APPROACH: ICD-10-PCS | Performed by: SURGERY

## 2024-12-20 PROCEDURE — 120N000002 HC R&B MED SURG/OB UMMC

## 2024-12-20 PROCEDURE — 85610 PROTHROMBIN TIME: CPT | Performed by: FAMILY MEDICINE

## 2024-12-20 PROCEDURE — 85004 AUTOMATED DIFF WBC COUNT: CPT | Performed by: FAMILY MEDICINE

## 2024-12-20 PROCEDURE — 80051 ELECTROLYTE PANEL: CPT | Performed by: FAMILY MEDICINE

## 2024-12-20 PROCEDURE — 99285 EMERGENCY DEPT VISIT HI MDM: CPT | Performed by: FAMILY MEDICINE

## 2024-12-20 PROCEDURE — 88307 TISSUE EXAM BY PATHOLOGIST: CPT | Mod: TC | Performed by: SURGERY

## 2024-12-20 PROCEDURE — 360N000077 HC SURGERY LEVEL 4, PER MIN: Performed by: SURGERY

## 2024-12-20 PROCEDURE — 96374 THER/PROPH/DIAG INJ IV PUSH: CPT | Performed by: FAMILY MEDICINE

## 2024-12-20 PROCEDURE — 86140 C-REACTIVE PROTEIN: CPT | Performed by: FAMILY MEDICINE

## 2024-12-20 PROCEDURE — 96376 TX/PRO/DX INJ SAME DRUG ADON: CPT | Performed by: FAMILY MEDICINE

## 2024-12-20 PROCEDURE — 250N000009 HC RX 250

## 2024-12-20 PROCEDURE — 258N000003 HC RX IP 258 OP 636: Performed by: FAMILY MEDICINE

## 2024-12-20 RX ORDER — SODIUM CHLORIDE, SODIUM LACTATE, POTASSIUM CHLORIDE, CALCIUM CHLORIDE 600; 310; 30; 20 MG/100ML; MG/100ML; MG/100ML; MG/100ML
INJECTION, SOLUTION INTRAVENOUS CONTINUOUS PRN
Status: DISCONTINUED | OUTPATIENT
Start: 2024-12-20 | End: 2024-12-20

## 2024-12-20 RX ORDER — ACETAMINOPHEN 325 MG/1
975 TABLET ORAL ONCE
Status: CANCELLED | OUTPATIENT
Start: 2024-12-20 | End: 2024-12-20

## 2024-12-20 RX ORDER — LIDOCAINE HYDROCHLORIDE 20 MG/ML
INJECTION, SOLUTION INFILTRATION; PERINEURAL PRN
Status: DISCONTINUED | OUTPATIENT
Start: 2024-12-20 | End: 2024-12-20

## 2024-12-20 RX ORDER — SODIUM CHLORIDE, SODIUM GLUCONATE, SODIUM ACETATE, POTASSIUM CHLORIDE AND MAGNESIUM CHLORIDE 526; 502; 368; 37; 30 MG/100ML; MG/100ML; MG/100ML; MG/100ML; MG/100ML
INJECTION, SOLUTION INTRAVENOUS CONTINUOUS PRN
Status: DISCONTINUED | OUTPATIENT
Start: 2024-12-20 | End: 2024-12-20

## 2024-12-20 RX ORDER — ALBUTEROL SULFATE 90 UG/1
2 INHALANT RESPIRATORY (INHALATION) EVERY 6 HOURS PRN
Status: DISCONTINUED | OUTPATIENT
Start: 2024-12-20 | End: 2024-12-25

## 2024-12-20 RX ORDER — NALOXONE HYDROCHLORIDE 0.4 MG/ML
0.1 INJECTION, SOLUTION INTRAMUSCULAR; INTRAVENOUS; SUBCUTANEOUS
Status: DISCONTINUED | OUTPATIENT
Start: 2024-12-20 | End: 2024-12-21 | Stop reason: HOSPADM

## 2024-12-20 RX ORDER — CEFAZOLIN SODIUM/WATER 2 G/20 ML
SYRINGE (ML) INTRAVENOUS PRN
Status: DISCONTINUED | OUTPATIENT
Start: 2024-12-20 | End: 2024-12-20

## 2024-12-20 RX ORDER — HYDROMORPHONE HCL IN WATER/PF 6 MG/30 ML
0.2 PATIENT CONTROLLED ANALGESIA SYRINGE INTRAVENOUS EVERY 5 MIN PRN
Status: DISCONTINUED | OUTPATIENT
Start: 2024-12-20 | End: 2024-12-21 | Stop reason: HOSPADM

## 2024-12-20 RX ORDER — CEFAZOLIN SODIUM 2 G/100ML
2 INJECTION, SOLUTION INTRAVENOUS SEE ADMIN INSTRUCTIONS
Status: CANCELLED | OUTPATIENT
Start: 2024-12-20

## 2024-12-20 RX ORDER — METRONIDAZOLE 500 MG/100ML
INJECTION, SOLUTION INTRAVENOUS PRN
Status: DISCONTINUED | OUTPATIENT
Start: 2024-12-20 | End: 2024-12-20

## 2024-12-20 RX ORDER — AMOXICILLIN 250 MG
2 CAPSULE ORAL 2 TIMES DAILY PRN
Status: DISCONTINUED | OUTPATIENT
Start: 2024-12-20 | End: 2024-12-20

## 2024-12-20 RX ORDER — HYDROMORPHONE HCL IN WATER/PF 6 MG/30 ML
0.4 PATIENT CONTROLLED ANALGESIA SYRINGE INTRAVENOUS EVERY 5 MIN PRN
Status: DISCONTINUED | OUTPATIENT
Start: 2024-12-20 | End: 2024-12-21 | Stop reason: HOSPADM

## 2024-12-20 RX ORDER — CALCIUM CARBONATE 500 MG/1
1000 TABLET, CHEWABLE ORAL 4 TIMES DAILY PRN
Status: DISCONTINUED | OUTPATIENT
Start: 2024-12-20 | End: 2024-12-25

## 2024-12-20 RX ORDER — AMOXICILLIN 250 MG
1 CAPSULE ORAL 2 TIMES DAILY PRN
Status: DISCONTINUED | OUTPATIENT
Start: 2024-12-20 | End: 2024-12-20

## 2024-12-20 RX ORDER — SODIUM CHLORIDE, SODIUM LACTATE, POTASSIUM CHLORIDE, CALCIUM CHLORIDE 600; 310; 30; 20 MG/100ML; MG/100ML; MG/100ML; MG/100ML
INJECTION, SOLUTION INTRAVENOUS CONTINUOUS
Status: DISCONTINUED | OUTPATIENT
Start: 2024-12-21 | End: 2024-12-21 | Stop reason: HOSPADM

## 2024-12-20 RX ORDER — HYDROMORPHONE HYDROCHLORIDE 1 MG/ML
0.5 INJECTION, SOLUTION INTRAMUSCULAR; INTRAVENOUS; SUBCUTANEOUS ONCE
Status: COMPLETED | OUTPATIENT
Start: 2024-12-20 | End: 2024-12-20

## 2024-12-20 RX ORDER — KETOROLAC TROMETHAMINE 30 MG/ML
INJECTION, SOLUTION INTRAMUSCULAR; INTRAVENOUS PRN
Status: DISCONTINUED | OUTPATIENT
Start: 2024-12-20 | End: 2024-12-20

## 2024-12-20 RX ORDER — ONDANSETRON 2 MG/ML
4 INJECTION INTRAMUSCULAR; INTRAVENOUS EVERY 30 MIN PRN
Status: DISCONTINUED | OUTPATIENT
Start: 2024-12-20 | End: 2024-12-21 | Stop reason: HOSPADM

## 2024-12-20 RX ORDER — LIDOCAINE 40 MG/G
CREAM TOPICAL
Status: DISCONTINUED | OUTPATIENT
Start: 2024-12-20 | End: 2024-12-20

## 2024-12-20 RX ORDER — DEXAMETHASONE SODIUM PHOSPHATE 4 MG/ML
4 INJECTION, SOLUTION INTRA-ARTICULAR; INTRALESIONAL; INTRAMUSCULAR; INTRAVENOUS; SOFT TISSUE
Status: DISCONTINUED | OUTPATIENT
Start: 2024-12-20 | End: 2024-12-21 | Stop reason: HOSPADM

## 2024-12-20 RX ORDER — CEFAZOLIN SODIUM 2 G/100ML
2 INJECTION, SOLUTION INTRAVENOUS
Status: CANCELLED | OUTPATIENT
Start: 2024-12-20

## 2024-12-20 RX ORDER — PROPOFOL 10 MG/ML
INJECTION, EMULSION INTRAVENOUS PRN
Status: DISCONTINUED | OUTPATIENT
Start: 2024-12-20 | End: 2024-12-20

## 2024-12-20 RX ORDER — ONDANSETRON 2 MG/ML
4 INJECTION INTRAMUSCULAR; INTRAVENOUS EVERY 30 MIN PRN
Status: DISCONTINUED | OUTPATIENT
Start: 2024-12-20 | End: 2024-12-20

## 2024-12-20 RX ORDER — ONDANSETRON 2 MG/ML
INJECTION INTRAMUSCULAR; INTRAVENOUS PRN
Status: DISCONTINUED | OUTPATIENT
Start: 2024-12-20 | End: 2024-12-20

## 2024-12-20 RX ORDER — DEXMEDETOMIDINE HYDROCHLORIDE 4 UG/ML
INJECTION, SOLUTION INTRAVENOUS CONTINUOUS PRN
Status: DISCONTINUED | OUTPATIENT
Start: 2024-12-20 | End: 2024-12-20

## 2024-12-20 RX ORDER — HYDROMORPHONE HYDROCHLORIDE 1 MG/ML
0.5 INJECTION, SOLUTION INTRAMUSCULAR; INTRAVENOUS; SUBCUTANEOUS
Status: DISCONTINUED | OUTPATIENT
Start: 2024-12-20 | End: 2024-12-21

## 2024-12-20 RX ORDER — ONDANSETRON 4 MG/1
4 TABLET, ORALLY DISINTEGRATING ORAL EVERY 30 MIN PRN
Status: DISCONTINUED | OUTPATIENT
Start: 2024-12-20 | End: 2024-12-21 | Stop reason: HOSPADM

## 2024-12-20 RX ORDER — ESMOLOL HYDROCHLORIDE 10 MG/ML
INJECTION INTRAVENOUS PRN
Status: DISCONTINUED | OUTPATIENT
Start: 2024-12-20 | End: 2024-12-20

## 2024-12-20 RX ADMIN — HYDROMORPHONE HYDROCHLORIDE 0.5 MG: 1 INJECTION, SOLUTION INTRAMUSCULAR; INTRAVENOUS; SUBCUTANEOUS at 18:13

## 2024-12-20 RX ADMIN — SODIUM CHLORIDE, POTASSIUM CHLORIDE, SODIUM LACTATE AND CALCIUM CHLORIDE: 600; 310; 30; 20 INJECTION, SOLUTION INTRAVENOUS at 21:33

## 2024-12-20 RX ADMIN — SODIUM CHLORIDE, SODIUM GLUCONATE, SODIUM ACETATE, POTASSIUM CHLORIDE AND MAGNESIUM CHLORIDE: 526; 502; 368; 37; 30 INJECTION, SOLUTION INTRAVENOUS at 21:49

## 2024-12-20 RX ADMIN — METRONIDAZOLE 500 MG: 500 INJECTION, SOLUTION INTRAVENOUS at 21:45

## 2024-12-20 RX ADMIN — Medication 0.2 MCG/KG/HR: at 21:55

## 2024-12-20 RX ADMIN — HYDROMORPHONE HYDROCHLORIDE 0.5 MG: 1 INJECTION, SOLUTION INTRAMUSCULAR; INTRAVENOUS; SUBCUTANEOUS at 21:43

## 2024-12-20 RX ADMIN — Medication 200 MG: at 23:03

## 2024-12-20 RX ADMIN — INSULIN HUMAN 10 UNITS/HR: 1 INJECTION, SOLUTION INTRAVENOUS at 21:52

## 2024-12-20 RX ADMIN — ESMOLOL HYDROCHLORIDE 20 MG: 10 INJECTION, SOLUTION INTRAVENOUS at 21:47

## 2024-12-20 RX ADMIN — SODIUM CHLORIDE 1000 ML: 9 INJECTION, SOLUTION INTRAVENOUS at 16:29

## 2024-12-20 RX ADMIN — PROPOFOL 100 MG: 10 INJECTION, EMULSION INTRAVENOUS at 21:43

## 2024-12-20 RX ADMIN — HYDROMORPHONE HYDROCHLORIDE 0.5 MG: 1 INJECTION, SOLUTION INTRAMUSCULAR; INTRAVENOUS; SUBCUTANEOUS at 16:35

## 2024-12-20 RX ADMIN — Medication 2 G: at 21:45

## 2024-12-20 RX ADMIN — HYDROMORPHONE HYDROCHLORIDE 0.3 MG: 1 INJECTION, SOLUTION INTRAMUSCULAR; INTRAVENOUS; SUBCUTANEOUS at 23:19

## 2024-12-20 RX ADMIN — KETOROLAC TROMETHAMINE 30 MG: 30 INJECTION, SOLUTION INTRAMUSCULAR at 23:30

## 2024-12-20 RX ADMIN — LIDOCAINE HYDROCHLORIDE 80 MG: 20 INJECTION, SOLUTION INFILTRATION; PERINEURAL at 21:43

## 2024-12-20 RX ADMIN — Medication 100 MG: at 21:43

## 2024-12-20 RX ADMIN — MIDAZOLAM 2 MG: 1 INJECTION INTRAMUSCULAR; INTRAVENOUS at 21:41

## 2024-12-20 RX ADMIN — ONDANSETRON 4 MG: 2 INJECTION INTRAMUSCULAR; INTRAVENOUS at 23:02

## 2024-12-20 RX ADMIN — HYDROMORPHONE HYDROCHLORIDE 0.5 MG: 1 INJECTION, SOLUTION INTRAMUSCULAR; INTRAVENOUS; SUBCUTANEOUS at 20:21

## 2024-12-20 RX ADMIN — HYDROMORPHONE HYDROCHLORIDE 0.2 MG: 0.2 INJECTION, SOLUTION INTRAMUSCULAR; INTRAVENOUS; SUBCUTANEOUS at 23:57

## 2024-12-20 RX ADMIN — ESMOLOL HYDROCHLORIDE 30 MG: 10 INJECTION, SOLUTION INTRAVENOUS at 21:43

## 2024-12-20 ASSESSMENT — ACTIVITIES OF DAILY LIVING (ADL)
ADLS_ACUITY_SCORE: 56

## 2024-12-20 ASSESSMENT — ENCOUNTER SYMPTOMS: SEIZURES: 1

## 2024-12-20 NOTE — ED TRIAGE NOTES
PT arrives c/o 10/10 generalized abd pain accompanied by N/V since yesterday. PT believes he is experiencing a bowel obstruction d/t also not passing gas.     Triage Assessment (Adult)       Row Name 12/20/24 0456          Triage Assessment    Airway WDL WDL        Respiratory WDL    Respiratory WDL WDL        Skin Circulation/Temperature WDL    Skin Circulation/Temperature WDL WDL        Cardiac WDL    Cardiac WDL WDL        Peripheral/Neurovascular WDL    Peripheral Neurovascular WDL WDL        Cognitive/Neuro/Behavioral WDL    Cognitive/Neuro/Behavioral WDL WDL

## 2024-12-20 NOTE — ED PROVIDER NOTES
ED Provider Note  [facility]      History     Chief Complaint   Patient presents with    Abdominal Pain     HPI  Andrew Collier is a 59 year old male with a history of multiple small bowel obstructions, HIV, type 2 diabetes, and methamphetamine use who presents to the emergency department with abdominal pain.    Per chart review, patient was recently admitted at [facility] from 12/17/2024 to 12/19/2024 for a SBO.  He was made n.p.o. and an NG tube was placed and run on low intermittent suction.  Prior to discharge, Gastrografin revealed no evidence of ongoing obstruction.  He was cleared for regular diet during discharge.    Here in the ED today, patient reports that he had a bowel movement prior to discharge yesterday and when he got home he ate a sandwich.  Shortly after that he started experiencing left-sided abdominal pain which is similar to the pain he has felt with his past small bowel obstructions in the past.  This has been accompanied by nausea.  He has not passed any gas or had any stool since yesterday.  He has been experiencing the symptoms yesterday and today.  He denies chest pain.  No other complaints.    CT ABDOMEN PELVIS W CONTRAST (12/17/2024)  1. Small bowel obstruction centered about the left hemiabdomen with a dilated bowel loop measuring up to 4.2 cm. As seen on prior CT, the involved small bowel segment demonstrates two apparent transition points suspicious for developing closed loop obstruction. No evidence of pneumatosis.  2. Concentric urinary bladder wall thickening likely represents changes of chronic partial outlet obstruction.  3. Cholelithiasis without acute cholecystitis.    XR GASTROGRAFIN CHALLENGE (12/18/2024)  1. Contrast in the colon extending to the rectum. No evidence of high-grade small bowel obstruction.  2. NG tube with sidehole and tip in the stomach.  Past Medical History  Past Medical History:   Diagnosis Date    Acute  appendicitis with localized peritonitis 1/31/2018    AIDS (H)     Allergic rhinitis due to other allergen     DNS    Anal dysplasia     Chronic abdominal pain     CNS toxoplasmosis (H)     COVID-19 1/11/2022    Diabetes type 2, controlled (H)     GERD (gastroesophageal reflux disease)     Herpes zoster 9/23/2016    History of seizure     History of substance abuse (H)     HIV (human immunodeficiency virus infection) (H)     HLD (hyperlipidemia)     Lung nodules     Periungual wart     Pneumonia 1/6/2019    PTSD (post-traumatic stress disorder)     Sleep apnea     doesn't use CPAP     Past Surgical History:   Procedure Laterality Date    ANOSCOPY N/A 9/9/2020    Procedure: Exam Under Anesthesia, ANOSCOPY, fulgeration of rectal fissures with Rectal Biopsies;  Surgeon: Thanh Lundberg MD;  Location: [facility] OR    COLONOSCOPY Left 1/22/2016    Procedure: COMBINED COLONOSCOPY, SINGLE OR MULTIPLE BIOPSY/POLYPECTOMY BY BIOPSY;  Surgeon: Clark Saini MD;  Location: [facility] GI    ESOPHAGOSCOPY, GASTROSCOPY, DUODENOSCOPY (EGD), COMBINED N/A 6/6/2022    Procedure: ESOPHAGOGASTRODUODENOSCOPY, WITH BIOPSY;  Surgeon: Kecia Benítez MD;  Location: [facility] GI    ESOPHAGOSCOPY, GASTROSCOPY, DUODENOSCOPY (EGD), COMBINED N/A 10/10/2024    Procedure: Esophagoscopy, gastroscopy, duodenoscopy (EGD), combined;  Surgeon: Nico Bhatti DO;  Location: [facility] GI    HC EXPLORE UNDESC TESTIS,INGUIN/SCROTAL      LAPAROSCOPIC APPENDECTOMY N/A 1/31/2018    Procedure: LAPAROSCOPIC APPENDECTOMY;  LAPAROSCOPIC APPENDECTOMY;  Surgeon: Dawn Holt MD;  Location: [facility] OR    LAPAROSCOPY DIAGNOSTIC (GENERAL) N/A 7/26/2016    Procedure: LAPAROSCOPY DIAGNOSTIC (GENERAL);  Surgeon: Susannah Arriaga MD;  Location: [facility]    LAPAROSCOPY DIAGNOSTIC (GENERAL) N/A 4/16/2018    Procedure: LAPAROSCOPY DIAGNOSTIC (GENERAL);  Diagnostic laparoscopy and lysis of adhesions;  Surgeon: Prince Dowling MD;  Location: [facility] OR    OPTICAL TRACKING SYSTEM  "CRANIOTOMY, EXCISE TUMOR, COMBINED Left 4/10/2015    Procedure: COMBINED OPTICAL TRACKING SYSTEM CRANIOTOMY, EXCISE TUMOR;  Surgeon: Mirlande Colmenares MD;  Location: [facility] OR    REPAIR GAMEKEEPER'S THUMB Right 12/2/2016    Procedure: REPAIR LIGAMENT ULNAR COLLATERAL THUMB (GAMEKEEPER'S);  Surgeon: Evin Zamorano MD;  Location:  OR    [facility] NONSPECIFIC PROCEDURE      right forearm fracture     No current outpatient medications on file.    Allergies   Allergen Reactions    Fentanyl Blisters     Per pt, after taking this medication had blisters develope    Tylenol [Acetaminophen] Itching    Dulaglutide Rash    Insulin Lispro Rash     Patient reported    Penicillin V Other (See Comments) and Rash     Diffuse maculopapular rash + feels \"high\", per pt.      Family History  Family History   Problem Relation Age of Onset    Diabetes Brother     Diabetes Father     Alzheimer Disease Father     Unknown/Adopted Mother     Diabetes Paternal Grandfather     Cancer No family hx of         no skin cancer    Skin Cancer No family hx of         no famiy hx of skin cancer    Glaucoma No family hx of     Macular Degeneration No family hx of      Social History   Social History     Tobacco Use    Smoking status: Some Days     Current packs/day: 0.25     Average packs/day: 0.3 packs/day for 40.0 years (10.0 ttl pk-yrs)     Types: Cigarettes    Smokeless tobacco: Former   Substance Use Topics    Alcohol use: No     Alcohol/week: 0.0 standard drinks of alcohol     Comment: Last etoh in 2007    Drug use: Not Currently     Types: Marijuana     Comment: chart indicated meth use but pt denies; states he does not use drugs      A medically appropriate review of systems was performed with pertinent positives and negatives noted in the HPI, and all other systems negative.    Physical Exam   BP: (!) 157/86  Pulse: 90  Temp: 98.2  F (36.8  C)  Resp: 16  SpO2: 98 %  Physical Exam  Vitals and nursing note reviewed.   Constitutional:       " General: He is in acute distress.      Appearance: Normal appearance. He is not ill-appearing or toxic-appearing.      Comments: Patient is nontoxic here in the ER but uncomfortable.  Vitally stable   HENT:      Head: Atraumatic.      Nose: Nose normal.      Mouth/Throat:      Mouth: Mucous membranes are dry.      Pharynx: Oropharynx is clear.   Eyes:      General: No scleral icterus.     Extraocular Movements: Extraocular movements intact.      Conjunctiva/sclera: Conjunctivae normal.      Pupils: Pupils are equal, round, and reactive to light.   Cardiovascular:      Rate and Rhythm: Normal rate and regular rhythm.      Heart sounds: Normal heart sounds.   Pulmonary:      Effort: Pulmonary effort is normal. No respiratory distress.      Breath sounds: Normal breath sounds. No stridor.   Abdominal:      General: There is no distension.      Palpations: Abdomen is soft.      Tenderness: There is abdominal tenderness.      Comments: No marked distention identified.  Tenderness more in the left upper or left side of the abdomen no masses noted no rebound or guarding   Musculoskeletal:         General: No swelling or deformity.      Cervical back: Normal range of motion and neck supple. No rigidity.   Skin:     General: Skin is warm.      Capillary Refill: Capillary refill takes less than 2 seconds.      Coloration: Skin is not jaundiced or pale.      Findings: No rash.   Neurological:      General: No focal deficit present.      Mental Status: He is alert. Mental status is at baseline.   Psychiatric:      Comments: Uncomfortable because of pain otherwise appropriate           ED Course, Procedures, & Data      Retrognathic.  Patient seen and admitted on the 17th 3 days ago to hospital discharge yesterday for small obstruction with CT scan had a Gastrografin enema also later noted appeared patent at this point.    Patient apparently was then discharged yesterday today now he tried eat a sandwich she continues to have  left side abdominal pain very similar to previous with nausea no vomiting has not passed any gas today.  No stools noted.  No bleeding otherwise identified.    In the ER we will establish IV recheck laboratory testing also give fluids Zofran for nausea will get a flat and upright abdominal film as he had normal CTs and evaluate for signs of recurrent obstruction free air etc.  EKG done without hyperacute ischemic changes noted low.    Plan upright abdominal films reveal no pneumatosis or free air no obvious obstructive gas pattern this was reviewed by myself.    CT scan of the abdomen without contrast as patient has had 1 recently was done again findings are that of concern of at least an incomplete/partial small bowel obstruction.    Initially did talk to surgery per medicine's recommendations surgery was too busy but recommended NG tube they can be consulted.      Labs drawn and reviewed.  Troponin 10.  Ketones negative urinalysis negative sodium 133 potassium 4.4.  Bicarb 20 gap is 12 glucose 392 urinalysis negative CRP is negative.  White count 7.5 hemoglobin 13.4.    Talk to medicine also will order NG tube for intermittent suction along with this IV fluids given in the ER.  Patient given Dilaudid IV for pain control.  Will be admitted to medicine service for recurrent small bowel obstruction just discharged yesterday patient agrees with plan.      Procedures            EKG Interpretation:      Interpreted by Sachin Rader MD  Time reviewed: 1359  Symptoms at time of EKG: abdominal pain   Rhythm: normal sinus   Rate: normal  Axis: normal  Ectopy: none  Conduction: normal  ST Segments/ T Waves: No hyperacute ST-T wave changes  Q Waves: none  Comparison to prior: Unchanged    Clinical Impression: nsr without hyperacute changes           Results for orders placed or performed during the hospital encounter of 12/20/24   Abdomen XR, 2 vw, flat and upright     Status: None    Narrative    EXAMINATION: XR ABDOMEN  2 VIEWS  12/20/2024 1:56 PM      CLINICAL HISTORY: luq pain hx of recurrent sbo eval and also for free  air etc    COMPARISON: 12/18/2024    FINDINGS:    Supine and upright frontal x-ray of the abdomen.Nonspecific bowel gas  pattern. Relative paucity of bowel gas. No definite pneumoperitoneum.  Mild to moderate colonic stool burden. Focal metallic opacity projects  over the right lower quadrant similar to prior radiograph from  12/18/2024, indeterminate, possibly ingested.      Impression    IMPRESSION:   1. Nonspecific bowel gas pattern. Relative paucity of bowel gas. No  definite pneumoperitoneum. Mild to moderate colonic stool burden.  2. Focal metallic opacity projects over the right lower quadrant  similar to prior radiograph from 12/18/2024, indeterminate, possibly  ingested/postprocedure. Consider correlation.    I have personally reviewed the examination and initial interpretation  and I agree with the findings.    LAURYN FOURNIER MD         SYSTEM ID:  [id number]   CT Abdomen Pelvis w/o Contrast     Status: None (Preliminary result)    Narrative    EXAMINATION: CT ABDOMEN PELVIS W/O CONTRAST, 12/20/2024 5:10 PM    INDICATION: eval for recurrent sbo    COMPARISON STUDY: 10/9/2024    TECHNIQUE: CT scan of the abdomen and pelvis was performed on  multidetector CT scanner using volumetric acquisition technique and  images were reconstructed in multiple planes with variable thickness  and reviewed on dedicated workstations.     CONTRAST: None   CT scan radiation dose is optimized to minimum requisite dose using  automated dose modulation techniques.    FINDINGS:    Lower thorax: Normal.    Liver: No mass. No intrahepatic biliary ductal dilation.    Biliary System: Cholelithiasis without cholecystitis.    Pancreas: No mass or pancreatic ductal dilation.    Adrenal glands: No mass or nodules    Spleen: Normal.    Kidneys: No suspicious mass, obstructing calculus or hydronephrosis.    Gastrointestinal tract :  Moderate thickening and distal esophagus.  Distention of the proximal stomach. Single dilated loop of small bowel  measuring up to 3.9 cm, for example series 2 image 45. This is  slightly increased in caliber compared to prior however decrease  portion of small bowel involved. Decompressed distal small bowel.  There is stool within the colon. No pneumatosis, mesenteric venous gas  or pneumoperitoneum. Normal appendix.    Mesentery/peritoneum/retroperitoneum: No mass. No free fluid or air.    Lymph nodes: No significant lymphadenopathy.    Vasculature: Patent major abdominal vasculature.    Pelvis: Urinary bladder is normal.  Penile pump in appropriate  position.  Prostatomegaly measuring 5.6 cm in transverse dimension.    Osseous structures: No aggressive or acute osseous lesion.      Soft tissues: Within normal limits.  Unchanged nodular density in the  anterior right lower quadrant subcutaneous soft tissue.      Impression    IMPRESSION:     Dilated loop of small bowel measuring up to 4.7 cm in the central  abdomen compatible with bowel obstruction; with 2 apparent transition  points, developing closed-loop obstruction not entirely excluded. No  pneumatosis, mesenteric venous gas or portal venous gas.   CRP inflammation     Status: Normal   Result Value Ref Range    CRP Inflammation <3.00 <5.00 mg/L   Partial thromboplastin time     Status: Normal   Result Value Ref Range    aPTT 28 22 - 38 Seconds   INR     Status: Normal   Result Value Ref Range    INR 0.97 0.85 - 1.15   Comprehensive metabolic panel     Status: Abnormal   Result Value Ref Range    Sodium 133 (L) 135 - 145 mmol/L    Potassium 4.4 3.4 - 5.3 mmol/L    Carbon Dioxide (CO2) 23 22 - 29 mmol/L    Anion Gap 12 7 - 15 mmol/L    Urea Nitrogen 20.4 8.0 - 23.0 mg/dL    Creatinine 0.82 0.67 - 1.17 mg/dL    GFR Estimate >90 >60 mL/min/1.73m2    Calcium 10.0 8.8 - 10.4 mg/dL    Chloride 98 98 - 107 mmol/L    Glucose 392 (H) 70 - 99 mg/dL    Alkaline  Phosphatase 132 40 - 150 U/L    AST 23 0 - 45 U/L    ALT 12 0 - 70 U/L    Protein Total 7.6 6.4 - 8.3 g/dL    Albumin 4.1 3.5 - 5.2 g/dL    Bilirubin Total 0.6 <=1.2 mg/dL   Lipase     Status: Normal   Result Value Ref Range    Lipase 57 13 - 60 U/L   Lactic Acid Whole Blood with 1X Repeat in 2 HR when >2     Status: Normal   Result Value Ref Range    Lactic Acid, Initial 1.1 0.7 - 2.0 mmol/L   UA with Microscopic reflex to Culture     Status: Abnormal    Specimen: Urine, NOS   Result Value Ref Range    Color Urine Light Yellow Colorless, Straw, Light Yellow, Yellow    Appearance Urine Clear Clear    Glucose Urine >=1000 (A) Negative mg/dL    Bilirubin Urine Negative Negative    Ketones Urine Negative Negative mg/dL    Specific Gravity Urine 1.020 1.003 - 1.035    Blood Urine Negative Negative    pH Urine 5.5 5.0 - 7.0    Protein Albumin Urine Negative Negative mg/dL    Urobilinogen Urine Normal Normal, 2.0 mg/dL    Nitrite Urine Negative Negative    Leukocyte Esterase Urine Negative Negative    Mucus Urine Present (A) None Seen /LPF    RBC Urine <1 <=2 /HPF    WBC Urine <1 <=5 /HPF    Narrative    Urine Culture not indicated   Troponin T, High Sensitivity     Status: Normal   Result Value Ref Range    Troponin T, High Sensitivity 11 <=22 ng/L   CBC with platelets and differential     Status: Abnormal   Result Value Ref Range    WBC Count 7.5 4.0 - 11.0 10e3/uL    RBC Count 4.74 4.40 - 5.90 10e6/uL    Hemoglobin 13.4 13.3 - 17.7 g/dL    Hematocrit 39.6 (L) 40.0 - 53.0 %    MCV 84 78 - 100 fL    MCH 28.3 26.5 - 33.0 pg    MCHC 33.8 31.5 - 36.5 g/dL    RDW 13.0 10.0 - 15.0 %    Platelet Count 435 150 - 450 10e3/uL    % Neutrophils 47 %    % Lymphocytes 41 %    % Monocytes 7 %    % Eosinophils 3 %    % Basophils 1 %    % Immature Granulocytes 1 %    NRBCs per 100 WBC 0 <1 /100    Absolute Neutrophils 3.6 1.6 - 8.3 10e3/uL    Absolute Lymphocytes 3.1 0.8 - 5.3 10e3/uL    Absolute Monocytes 0.5 0.0 - 1.3 10e3/uL     Absolute Eosinophils 0.2 0.0 - 0.7 10e3/uL    Absolute Basophils 0.1 0.0 - 0.2 10e3/uL    Absolute Immature Granulocytes 0.1 <=0.4 10e3/uL    Absolute NRBCs 0.0 10e3/uL   Troponin T, High Sensitivity     Status: Normal   Result Value Ref Range    Troponin T, High Sensitivity 10 <=22 ng/L   Glucose by meter     Status: Abnormal   Result Value Ref Range    GLUCOSE BY METER POCT 288 (H) 70 - 99 mg/dL   Ketone Beta-Hydroxybutyrate Quantitative     Status: Normal   Result Value Ref Range    Ketone (Beta-Hydroxybutyrate) Quantitative <0.18 <=0.30 mmol/L   Glucose by meter     Status: Abnormal   Result Value Ref Range    GLUCOSE BY METER POCT 254 (H) 70 - 99 mg/dL   EKG 12-lead, tracing only     Status: None   Result Value Ref Range    Systolic Blood Pressure  mmHg    Diastolic Blood Pressure  mmHg    Ventricular Rate 88 BPM    Atrial Rate 88 BPM    MT Interval 124 ms    QRS Duration 88 ms     ms    QTc 423 ms    P Axis 51 degrees    R AXIS -11 degrees    T Axis 37 degrees    Interpretation ECG       Sinus rhythm  Normal ECG  Unconfirmed report - interpretation of this ECG is computer generated - see medical record for final interpretation  Confirmed by - EMERGENCY ROOM, PHYSICIAN ([id number]),  ALEJANDRINA COSTELLO ([id number]) on 12/20/2024 7:20:35 PM     CBC with platelets differential     Status: Abnormal    Narrative    The following orders were created for panel order CBC with platelets differential.  Procedure                               Abnormality         Status                     ---------                               -----------         ------                     CBC with platelets and d...[[id number]]  Abnormal            Final result                 Please view results for these tests on the individual orders.     Medications   HYDROmorphone (PF) (DILAUDID) injection 0.5 mg ( Intravenous Auto Hold 12/20/24 2132)   calcium carbonate (TUMS) chewable tablet 1,000 mg ( Oral Auto Hold 12/20/24 2132)   albuterol  (PROVENTIL HFA/VENTOLIN HFA) inhaler ( Inhalation Auto Hold 12/20/24 2132)   bictegravir-emtricitabine-tenofovir (BIKTARVY) -25 MG per tablet 1 tablet ( Oral Automatically Held 12/24/24 0800)   sodium chloride 0.9% BOLUS 1,000 mL (0 mLs Intravenous Stopped 12/20/24 1808)   HYDROmorphone (PF) (DILAUDID) injection 0.5 mg (0.5 mg Intravenous $Given 12/20/24 1635)   HYDROmorphone (PF) (DILAUDID) injection 0.5 mg (0.5 mg Intravenous $Given 12/20/24 1813)     Labs Ordered and Resulted from Time of ED Arrival to Time of ED Departure   COMPREHENSIVE METABOLIC PANEL - Abnormal       Result Value    Sodium 133 (*)     Potassium 4.4      Carbon Dioxide (CO2) 23      Anion Gap 12      Urea Nitrogen 20.4      Creatinine 0.82      GFR Estimate >90      Calcium 10.0      Chloride 98      Glucose 392 (*)     Alkaline Phosphatase 132      AST 23      ALT 12      Protein Total 7.6      Albumin 4.1      Bilirubin Total 0.6     ROUTINE UA WITH MICROSCOPIC REFLEX TO CULTURE - Abnormal    Color Urine Light Yellow      Appearance Urine Clear      Glucose Urine >=1000 (*)     Bilirubin Urine Negative      Ketones Urine Negative      Specific Gravity Urine 1.020      Blood Urine Negative      pH Urine 5.5      Protein Albumin Urine Negative      Urobilinogen Urine Normal      Nitrite Urine Negative      Leukocyte Esterase Urine Negative      Mucus Urine Present (*)     RBC Urine <1      WBC Urine <1     CBC WITH PLATELETS AND DIFFERENTIAL - Abnormal    WBC Count 7.5      RBC Count 4.74      Hemoglobin 13.4      Hematocrit 39.6 (*)     MCV 84      MCH 28.3      MCHC 33.8      RDW 13.0      Platelet Count 435      % Neutrophils 47      % Lymphocytes 41      % Monocytes 7      % Eosinophils 3      % Basophils 1      % Immature Granulocytes 1      NRBCs per 100 WBC 0      Absolute Neutrophils 3.6      Absolute Lymphocytes 3.1      Absolute Monocytes 0.5      Absolute Eosinophils 0.2      Absolute Basophils 0.1      Absolute Immature  Granulocytes 0.1      Absolute NRBCs 0.0     GLUCOSE BY METER - Abnormal    GLUCOSE BY METER POCT 288 (*)    CRP INFLAMMATION - Normal    CRP Inflammation <3.00     PARTIAL THROMBOPLASTIN TIME - Normal    aPTT 28     INR - Normal    INR 0.97     LIPASE - Normal    Lipase 57     LACTIC ACID WHOLE BLOOD WITH 1X REPEAT IN 2 HR WHEN >2 - Normal    Lactic Acid, Initial 1.1     TROPONIN T, HIGH SENSITIVITY - Normal    Troponin T, High Sensitivity 11     TROPONIN T, HIGH SENSITIVITY - Normal    Troponin T, High Sensitivity 10     KETONE BETA-HYDROXYBUTYRATE QUANTITATIVE, RAPID - Normal    Ketone (Beta-Hydroxybutyrate) Quantitative <0.18     GLUCOSE MONITOR NURSING POCT   BLOOD GAS VENOUS     CT Abdomen Pelvis w/o Contrast   Preliminary Result   IMPRESSION:       Dilated loop of small bowel measuring up to 4.7 cm in the central   abdomen compatible with bowel obstruction; with 2 apparent transition   points, developing closed-loop obstruction not entirely excluded. No   pneumatosis, mesenteric venous gas or portal venous gas.      Abdomen XR, 2 vw, flat and upright   Final Result   IMPRESSION:    1. Nonspecific bowel gas pattern. Relative paucity of bowel gas. No   definite pneumoperitoneum. Mild to moderate colonic stool burden.   2. Focal metallic opacity projects over the right lower quadrant   similar to prior radiograph from 12/18/2024, indeterminate, possibly   ingested/postprocedure. Consider correlation.      I have personally reviewed the examination and initial interpretation   and I agree with the findings.      LAURYN FOURNIER MD            SYSTEM ID:  [id number]             Critical care was not performed.     Medical Decision Making  The patient's presentation was of moderate complexity (an acute illness with systemic symptoms).    The patient's evaluation involved:  review of external note(s) from 3+ sources (see separate area of note for details)  review of 3+ test result(s) ordered prior to this encounter  (see separate area of note for details)  ordering and/or review of 3+ test(s) in this encounter (see separate area of note for details)  discussion of management or test interpretation with another health professional (see separate area of note for details)    The patient's management necessitated high risk (a parenteral controlled substance) and high risk (a decision regarding hospitalization).    Assessment & Plan   59-year-old male with recent small bowel obstruction treated with NG suction discharged yesterday now presents with recurrent symptoms.  Patient with nausea abdominal pain not passing gas abdomen no soft tenderness left sided.  Symptoms consistent with previous.  Flat and upright abdominal films did not reveal any free air or pneumatosis labs otherwise relatively stable cardiac workup negative and IV fluids Dilaudid IV for pain control Zofran IV for nausea CT scan without contrast the abdomen reveals partial small bowel obstruction seen discussed with surgery and medicine admitted to medicine NG tube was placed.  Surgery to be consulted.       I have reviewed the nursing notes. I have reviewed the findings, diagnosis, plan and need for follow up with the patient.    Current Discharge Medication List          Final diagnoses:   Small bowel obstruction (H)   Generalized abdominal pain   Nausea and vomiting, unspecified vomiting type   I, Shabnam Ojeda, am serving as a trained medical scribe to document services personally performed by Sachin Rader MD, based on the provider's statements to me.     I, Sachin Rader MD, was physically present and have reviewed and verified the accuracy of this note documented by Shabnam Ojeda.     Sachin Rader MD  [facility] EMERGENCY DEPARTMENT  12/20/2024    This note was created at least in part by the use of dragon voice dictation system. Inadvertent typographical errors may still exist.  Sachin Rader MD.  Patient evaluated in  the emergency department during the COVID-19 pandemic period. Careful attention to patients safety was addressed throughout the evaluation. Evaluation and treatment management was initiated with disposition made efficiently and appropriate as possible to minimize any risk of potential exposure to patient during this evaluation.       Sachin Rader MD  12/20/24 0067

## 2024-12-20 NOTE — LETTER
[facility] 7C MED SURG  [address]  [place] [address]  Phone: [phone]    2024        Julio Fabián Collier  [facility] [place] [address]          To whom it may concern:    RE: Andrew Collier (: 1965    Patient has been seen and treated at our hospital from 2024 to present. We would anticipate he will be in the hospital for an additional 2-4 days before he is medically ready for discharge. He has indicated a desire to return to [facility] upon hospital discharge.     Please contact me for questions or concerns.      Sincerely,      RADHA Kamara, [facility] Center[facility]  Clinical   [facility]   Ph: [phone]

## 2024-12-21 ENCOUNTER — APPOINTMENT (OUTPATIENT)
Dept: PHYSICAL THERAPY | Facility: CLINIC | Age: 59
End: 2024-12-21
Payer: COMMERCIAL

## 2024-12-21 LAB
ALBUMIN SERPL BCG-MCNC: 3.4 G/DL (ref 3.5–5.2)
ALP SERPL-CCNC: 72 U/L (ref 40–150)
ALT SERPL W P-5'-P-CCNC: <5 U/L (ref 0–70)
ANION GAP SERPL CALCULATED.3IONS-SCNC: 12 MMOL/L (ref 7–15)
AST SERPL W P-5'-P-CCNC: 18 U/L (ref 0–45)
BASE EXCESS BLDV CALC-SCNC: -2.2 MMOL/L (ref -3–3)
BASOPHILS # BLD AUTO: 0 10E3/UL (ref 0–0.2)
BASOPHILS NFR BLD AUTO: 0 %
BILIRUB SERPL-MCNC: 1.3 MG/DL
BUN SERPL-MCNC: 20.3 MG/DL (ref 8–23)
CALCIUM SERPL-MCNC: 8.2 MG/DL (ref 8.8–10.4)
CHLORIDE SERPL-SCNC: 103 MMOL/L (ref 98–107)
CREAT SERPL-MCNC: 0.84 MG/DL (ref 0.67–1.17)
EGFRCR SERPLBLD CKD-EPI 2021: >90 ML/MIN/1.73M2
EOSINOPHIL # BLD AUTO: 0 10E3/UL (ref 0–0.7)
EOSINOPHIL NFR BLD AUTO: 0 %
ERYTHROCYTE [DISTWIDTH] IN BLOOD BY AUTOMATED COUNT: 13.3 % (ref 10–15)
GLUCOSE BLDC GLUCOMTR-MCNC: 115 MG/DL (ref 70–99)
GLUCOSE BLDC GLUCOMTR-MCNC: 162 MG/DL (ref 70–99)
GLUCOSE BLDC GLUCOMTR-MCNC: 197 MG/DL (ref 70–99)
GLUCOSE BLDC GLUCOMTR-MCNC: 210 MG/DL (ref 70–99)
GLUCOSE BLDC GLUCOMTR-MCNC: 241 MG/DL (ref 70–99)
GLUCOSE BLDC GLUCOMTR-MCNC: 256 MG/DL (ref 70–99)
GLUCOSE BLDC GLUCOMTR-MCNC: 257 MG/DL (ref 70–99)
GLUCOSE BLDC GLUCOMTR-MCNC: 265 MG/DL (ref 70–99)
GLUCOSE BLDC GLUCOMTR-MCNC: 297 MG/DL (ref 70–99)
GLUCOSE BLDC GLUCOMTR-MCNC: 318 MG/DL (ref 70–99)
GLUCOSE SERPL-MCNC: 297 MG/DL (ref 70–99)
HCO3 BLDV-SCNC: 23 MMOL/L (ref 21–28)
HCO3 SERPL-SCNC: 20 MMOL/L (ref 22–29)
HCT VFR BLD AUTO: 39 % (ref 40–53)
HGB BLD-MCNC: 12.8 G/DL (ref 13.3–17.7)
IMM GRANULOCYTES # BLD: 0 10E3/UL
IMM GRANULOCYTES NFR BLD: 0 %
LACTATE SERPL-SCNC: 1.6 MMOL/L (ref 0.7–2)
LYMPHOCYTES # BLD AUTO: 0.9 10E3/UL (ref 0.8–5.3)
LYMPHOCYTES NFR BLD AUTO: 17 %
MCH RBC QN AUTO: 28.1 PG (ref 26.5–33)
MCHC RBC AUTO-ENTMCNC: 32.8 G/DL (ref 31.5–36.5)
MCV RBC AUTO: 86 FL (ref 78–100)
MONOCYTES # BLD AUTO: 0.4 10E3/UL (ref 0–1.3)
MONOCYTES NFR BLD AUTO: 7 %
NEUTROPHILS # BLD AUTO: 4.3 10E3/UL (ref 1.6–8.3)
NEUTROPHILS NFR BLD AUTO: 76 %
NRBC # BLD AUTO: 0 10E3/UL
NRBC BLD AUTO-RTO: 0 /100
O2/TOTAL GAS SETTING VFR VENT: 21 %
OXYHGB MFR BLDV: 88 % (ref 70–75)
PCO2 BLDV: 38 MM HG (ref 40–50)
PH BLDV: 7.38 [PH] (ref 7.32–7.43)
PLATELET # BLD AUTO: 311 10E3/UL (ref 150–450)
PO2 BLDV: 58 MM HG (ref 25–47)
POTASSIUM SERPL-SCNC: 3.6 MMOL/L (ref 3.4–5.3)
PROT SERPL-MCNC: 6.3 G/DL (ref 6.4–8.3)
RBC # BLD AUTO: 4.55 10E6/UL (ref 4.4–5.9)
SAO2 % BLDV: 90.2 % (ref 70–75)
SODIUM SERPL-SCNC: 135 MMOL/L (ref 135–145)
WBC # BLD AUTO: 5.7 10E3/UL (ref 4–11)

## 2024-12-21 PROCEDURE — 250N000011 HC RX IP 250 OP 636: Performed by: INTERNAL MEDICINE

## 2024-12-21 PROCEDURE — 85004 AUTOMATED DIFF WBC COUNT: CPT | Performed by: STUDENT IN AN ORGANIZED HEALTH CARE EDUCATION/TRAINING PROGRAM

## 2024-12-21 PROCEDURE — 99222 1ST HOSP IP/OBS MODERATE 55: CPT | Performed by: ANESTHESIOLOGY

## 2024-12-21 PROCEDURE — 250N000011 HC RX IP 250 OP 636: Performed by: STUDENT IN AN ORGANIZED HEALTH CARE EDUCATION/TRAINING PROGRAM

## 2024-12-21 PROCEDURE — 999N000157 HC STATISTIC RCP TIME EA 10 MIN

## 2024-12-21 PROCEDURE — 93010 ELECTROCARDIOGRAM REPORT: CPT | Performed by: INTERNAL MEDICINE

## 2024-12-21 PROCEDURE — 97161 PT EVAL LOW COMPLEX 20 MIN: CPT | Mod: GP | Performed by: STUDENT IN AN ORGANIZED HEALTH CARE EDUCATION/TRAINING PROGRAM

## 2024-12-21 PROCEDURE — 250N000013 HC RX MED GY IP 250 OP 250 PS 637: Performed by: STUDENT IN AN ORGANIZED HEALTH CARE EDUCATION/TRAINING PROGRAM

## 2024-12-21 PROCEDURE — 36415 COLL VENOUS BLD VENIPUNCTURE: CPT | Performed by: STUDENT IN AN ORGANIZED HEALTH CARE EDUCATION/TRAINING PROGRAM

## 2024-12-21 PROCEDURE — 250N000009 HC RX 250: Performed by: STUDENT IN AN ORGANIZED HEALTH CARE EDUCATION/TRAINING PROGRAM

## 2024-12-21 PROCEDURE — 258N000003 HC RX IP 258 OP 636: Performed by: STUDENT IN AN ORGANIZED HEALTH CARE EDUCATION/TRAINING PROGRAM

## 2024-12-21 PROCEDURE — 250N000011 HC RX IP 250 OP 636

## 2024-12-21 PROCEDURE — 97530 THERAPEUTIC ACTIVITIES: CPT | Mod: GP | Performed by: STUDENT IN AN ORGANIZED HEALTH CARE EDUCATION/TRAINING PROGRAM

## 2024-12-21 PROCEDURE — 250N000012 HC RX MED GY IP 250 OP 636 PS 637

## 2024-12-21 PROCEDURE — 93005 ELECTROCARDIOGRAM TRACING: CPT

## 2024-12-21 PROCEDURE — 99233 SBSQ HOSP IP/OBS HIGH 50: CPT | Performed by: INTERNAL MEDICINE

## 2024-12-21 PROCEDURE — 85041 AUTOMATED RBC COUNT: CPT | Performed by: STUDENT IN AN ORGANIZED HEALTH CARE EDUCATION/TRAINING PROGRAM

## 2024-12-21 PROCEDURE — 258N000003 HC RX IP 258 OP 636

## 2024-12-21 PROCEDURE — 120N000002 HC R&B MED SURG/OB UMMC

## 2024-12-21 PROCEDURE — 250N000011 HC RX IP 250 OP 636: Performed by: FAMILY MEDICINE

## 2024-12-21 PROCEDURE — 83605 ASSAY OF LACTIC ACID: CPT | Performed by: STUDENT IN AN ORGANIZED HEALTH CARE EDUCATION/TRAINING PROGRAM

## 2024-12-21 PROCEDURE — 82310 ASSAY OF CALCIUM: CPT | Performed by: STUDENT IN AN ORGANIZED HEALTH CARE EDUCATION/TRAINING PROGRAM

## 2024-12-21 PROCEDURE — 82805 BLOOD GASES W/O2 SATURATION: CPT | Performed by: STUDENT IN AN ORGANIZED HEALTH CARE EDUCATION/TRAINING PROGRAM

## 2024-12-21 RX ORDER — NALOXONE HYDROCHLORIDE 0.4 MG/ML
0.4 INJECTION, SOLUTION INTRAMUSCULAR; INTRAVENOUS; SUBCUTANEOUS
Status: ACTIVE | OUTPATIENT
Start: 2024-12-21

## 2024-12-21 RX ORDER — NALOXONE HYDROCHLORIDE 0.4 MG/ML
0.2 INJECTION, SOLUTION INTRAMUSCULAR; INTRAVENOUS; SUBCUTANEOUS
Status: ACTIVE | OUTPATIENT
Start: 2024-12-21

## 2024-12-21 RX ORDER — NICOTINE POLACRILEX 4 MG
15-30 LOZENGE BUCCAL
Status: DISCONTINUED | OUTPATIENT
Start: 2024-12-21 | End: 2024-12-21

## 2024-12-21 RX ORDER — ENOXAPARIN SODIUM 100 MG/ML
40 INJECTION SUBCUTANEOUS EVERY 24 HOURS
Status: DISPENSED | OUTPATIENT
Start: 2024-12-21

## 2024-12-21 RX ORDER — NALOXONE HYDROCHLORIDE 0.4 MG/ML
0.2 INJECTION, SOLUTION INTRAMUSCULAR; INTRAVENOUS; SUBCUTANEOUS
Status: DISPENSED | OUTPATIENT
Start: 2024-12-21

## 2024-12-21 RX ORDER — OXYCODONE HYDROCHLORIDE 5 MG/1
5 TABLET ORAL EVERY 4 HOURS PRN
Status: DISCONTINUED | OUTPATIENT
Start: 2024-12-21 | End: 2024-12-21

## 2024-12-21 RX ORDER — HYDROMORPHONE HYDROCHLORIDE 1 MG/ML
0.5 INJECTION, SOLUTION INTRAMUSCULAR; INTRAVENOUS; SUBCUTANEOUS
Status: DISCONTINUED | OUTPATIENT
Start: 2024-12-21 | End: 2024-12-21

## 2024-12-21 RX ORDER — SODIUM CHLORIDE 9 MG/ML
INJECTION, SOLUTION INTRAVENOUS CONTINUOUS
Status: ACTIVE | OUTPATIENT
Start: 2024-12-21 | End: 2024-12-25

## 2024-12-21 RX ORDER — DEXTROSE MONOHYDRATE 25 G/50ML
25-50 INJECTION, SOLUTION INTRAVENOUS
Status: ACTIVE | OUTPATIENT
Start: 2024-12-21

## 2024-12-21 RX ORDER — NICOTINE POLACRILEX 4 MG
15-30 LOZENGE BUCCAL
Status: ACTIVE | OUTPATIENT
Start: 2024-12-21

## 2024-12-21 RX ORDER — HYDROMORPHONE HYDROCHLORIDE 1 MG/ML
0.5 INJECTION, SOLUTION INTRAMUSCULAR; INTRAVENOUS; SUBCUTANEOUS ONCE
Status: COMPLETED | OUTPATIENT
Start: 2024-12-21 | End: 2024-12-21

## 2024-12-21 RX ORDER — METOPROLOL TARTRATE 1 MG/ML
2.5 INJECTION, SOLUTION INTRAVENOUS EVERY 6 HOURS
Status: DISCONTINUED | OUTPATIENT
Start: 2024-12-21 | End: 2024-12-22

## 2024-12-21 RX ORDER — LIDOCAINE 4 G/G
1 PATCH TOPICAL
Status: DISPENSED | OUTPATIENT
Start: 2024-12-21

## 2024-12-21 RX ORDER — LIDOCAINE 40 MG/G
CREAM TOPICAL
Status: ACTIVE | OUTPATIENT
Start: 2024-12-21

## 2024-12-21 RX ORDER — DEXTROSE MONOHYDRATE 25 G/50ML
25-50 INJECTION, SOLUTION INTRAVENOUS
Status: DISCONTINUED | OUTPATIENT
Start: 2024-12-21 | End: 2024-12-21

## 2024-12-21 RX ORDER — OXYCODONE HYDROCHLORIDE 10 MG/1
10 TABLET ORAL ONCE
Status: COMPLETED | OUTPATIENT
Start: 2024-12-21 | End: 2024-12-21

## 2024-12-21 RX ORDER — FAMOTIDINE 20 MG/1
20 TABLET, FILM COATED ORAL 2 TIMES DAILY
Status: DISCONTINUED | OUTPATIENT
Start: 2024-12-21 | End: 2024-12-24

## 2024-12-21 RX ADMIN — SODIUM CHLORIDE, POTASSIUM CHLORIDE, SODIUM LACTATE AND CALCIUM CHLORIDE 1000 ML: 600; 310; 30; 20 INJECTION, SOLUTION INTRAVENOUS at 10:44

## 2024-12-21 RX ADMIN — METOPROLOL TARTRATE 2.5 MG: 5 INJECTION INTRAVENOUS at 11:23

## 2024-12-21 RX ADMIN — HYDROMORPHONE HYDROCHLORIDE 0.2 MG: 0.2 INJECTION, SOLUTION INTRAMUSCULAR; INTRAVENOUS; SUBCUTANEOUS at 00:04

## 2024-12-21 RX ADMIN — ANTACID TABLETS 1000 MG: 500 TABLET, CHEWABLE ORAL at 13:28

## 2024-12-21 RX ADMIN — METOPROLOL TARTRATE 2.5 MG: 5 INJECTION INTRAVENOUS at 16:38

## 2024-12-21 RX ADMIN — HYDROMORPHONE HYDROCHLORIDE 0.5 MG: 1 INJECTION, SOLUTION INTRAMUSCULAR; INTRAVENOUS; SUBCUTANEOUS at 03:20

## 2024-12-21 RX ADMIN — HYDROMORPHONE HYDROCHLORIDE 0.5 MG: 1 INJECTION, SOLUTION INTRAMUSCULAR; INTRAVENOUS; SUBCUTANEOUS at 09:29

## 2024-12-21 RX ADMIN — Medication: at 12:06

## 2024-12-21 RX ADMIN — HYDROMORPHONE HYDROCHLORIDE 0.5 MG: 1 INJECTION, SOLUTION INTRAMUSCULAR; INTRAVENOUS; SUBCUTANEOUS at 09:51

## 2024-12-21 RX ADMIN — HYDROMORPHONE HYDROCHLORIDE 0.5 MG: 1 INJECTION, SOLUTION INTRAMUSCULAR; INTRAVENOUS; SUBCUTANEOUS at 07:42

## 2024-12-21 RX ADMIN — HYDROMORPHONE HYDROCHLORIDE 0.5 MG: 1 INJECTION, SOLUTION INTRAMUSCULAR; INTRAVENOUS; SUBCUTANEOUS at 05:17

## 2024-12-21 RX ADMIN — SODIUM CHLORIDE: 9 INJECTION, SOLUTION INTRAVENOUS at 03:24

## 2024-12-21 RX ADMIN — INSULIN GLARGINE 5 UNITS: 100 INJECTION, SOLUTION SUBCUTANEOUS at 10:46

## 2024-12-21 RX ADMIN — INSULIN ASPART 4 UNITS: 100 INJECTION, SOLUTION INTRAVENOUS; SUBCUTANEOUS at 10:46

## 2024-12-21 RX ADMIN — METOPROLOL TARTRATE 2.5 MG: 5 INJECTION INTRAVENOUS at 23:18

## 2024-12-21 RX ADMIN — ANTACID TABLETS 1000 MG: 500 TABLET, CHEWABLE ORAL at 16:35

## 2024-12-21 RX ADMIN — FAMOTIDINE 20 MG: 20 TABLET, FILM COATED ORAL at 19:38

## 2024-12-21 RX ADMIN — HYDROMORPHONE HYDROCHLORIDE 0.5 MG: 1 INJECTION, SOLUTION INTRAMUSCULAR; INTRAVENOUS; SUBCUTANEOUS at 01:48

## 2024-12-21 RX ADMIN — INSULIN ASPART 4 UNITS: 100 INJECTION, SOLUTION INTRAVENOUS; SUBCUTANEOUS at 14:09

## 2024-12-21 RX ADMIN — SODIUM CHLORIDE: 9 INJECTION, SOLUTION INTRAVENOUS at 20:21

## 2024-12-21 RX ADMIN — SODIUM CHLORIDE, POTASSIUM CHLORIDE, SODIUM LACTATE AND CALCIUM CHLORIDE: 600; 310; 30; 20 INJECTION, SOLUTION INTRAVENOUS at 00:00

## 2024-12-21 RX ADMIN — ANTACID TABLETS 1000 MG: 500 TABLET, CHEWABLE ORAL at 23:28

## 2024-12-21 RX ADMIN — HYDROMORPHONE HYDROCHLORIDE 0.4 MG: 0.2 INJECTION, SOLUTION INTRAMUSCULAR; INTRAVENOUS; SUBCUTANEOUS at 00:15

## 2024-12-21 RX ADMIN — HYDROMORPHONE HYDROCHLORIDE 0.4 MG: 0.2 INJECTION, SOLUTION INTRAMUSCULAR; INTRAVENOUS; SUBCUTANEOUS at 00:25

## 2024-12-21 RX ADMIN — OXYCODONE HYDROCHLORIDE 10 MG: 10 TABLET ORAL at 11:19

## 2024-12-21 RX ADMIN — HYDROMORPHONE HYDROCHLORIDE 0.4 MG: 0.2 INJECTION, SOLUTION INTRAMUSCULAR; INTRAVENOUS; SUBCUTANEOUS at 00:36

## 2024-12-21 RX ADMIN — HYDROMORPHONE HYDROCHLORIDE 0.4 MG: 0.2 INJECTION, SOLUTION INTRAMUSCULAR; INTRAVENOUS; SUBCUTANEOUS at 00:09

## 2024-12-21 RX ADMIN — ENOXAPARIN SODIUM 40 MG: 40 INJECTION SUBCUTANEOUS at 19:38

## 2024-12-21 RX ADMIN — BICTEGRAVIR SODIUM, EMTRICITABINE, AND TENOFOVIR ALAFENAMIDE FUMARATE 1 TABLET: 50; 200; 25 TABLET ORAL at 16:36

## 2024-12-21 ASSESSMENT — ACTIVITIES OF DAILY LIVING (ADL)
ADLS_ACUITY_SCORE: 33
ADLS_ACUITY_SCORE: 58
ADLS_ACUITY_SCORE: 33
DEPENDENT_IADLS:: INDEPENDENT
ADLS_ACUITY_SCORE: 33
ADLS_ACUITY_SCORE: 58
ADLS_ACUITY_SCORE: 33

## 2024-12-21 NOTE — ANESTHESIA CARE TRANSFER NOTE
Patient: Andrew Collier    Procedure: Procedure(s):  Laparotomy exploratory with smal bowel resection.       Diagnosis: Bowel obstruction (H) [K56.609]  Diagnosis Additional Information: No value filed.    Anesthesia Type:   General     Note:    Oropharynx: oropharynx clear of all foreign objects and spontaneously breathing  Level of Consciousness: drowsy  Oxygen Supplementation: face mask  Level of Supplemental Oxygen (L/min / FiO2): 8  Independent Airway: airway patency satisfactory and stable  Dentition: dentition unchanged  Vital Signs Stable: post-procedure vital signs reviewed and stable  Report to RN Given: handoff report given  Patient transferred to: PACU    Handoff Report: Identifed the Patient, Identified the Reponsible Provider, Reviewed the pertinent medical history, Discussed the surgical course, Reviewed Intra-OP anesthesia mangement and issues during anesthesia, Set expectations for post-procedure period and Allowed opportunity for questions and acknowledgement of understanding      Vitals:  Vitals Value Taken Time   /96 12/20/24 2345   Temp     Pulse 84 12/20/24 2348   Resp 13 12/20/24 2348   SpO2 100 % 12/20/24 2348   Vitals shown include unfiled device data.    Electronically Signed By: Valeri Oro DO  December 20, 2024  11:48 PM

## 2024-12-21 NOTE — ANESTHESIA PROCEDURE NOTES
Airway       Patient location during procedure: OR       Procedure Start/Stop Times: 12/20/2024 9:45 PM  Staff -        Anesthesiologist:  Luis Alberto Palmer MD       Resident/Fellow: Valeri Oro DO       Performed By: residentIndications and Patient Condition       Indications for airway management: tammy-procedural       Induction type:intravenous       Mask difficulty assessment: 1 - vent by mask    Final Airway Details       Final airway type: endotracheal airway       Successful airway: ETT - single and Oral  Endotracheal Airway Details        ETT size (mm): 8.0       Cuffed: yes       Adjucts: stylet       Position: Right       Measured from: lips       Secured at (cm): 23       Bite block used: None    Post intubation assessment        Number of attempts at approach: 2       Number of other approaches attempted: 0       Secured with: tape       Ease of procedure: easy       Dentition: Intact and Unchanged    Medication(s) Administered   Medication Administration Time: 12/20/2024 9:45 PM

## 2024-12-21 NOTE — PLAN OF CARE
Goal Outcome Evaluation: Assumed cares [id number]. Pt A&Ox4, VSS -except tachycardia, on RA. Pt HR consistently in the 140's, MD notified and put in for 12 lead EKG. Pt c/o 10/10 abdominal pain, managed with PRN IV dilaudid x3. Pt refusing repositioning. Voiding via granados with good UOP, no BM. NG tube w/ low intermittent suction. L PIV infusing NS continuous @125 mL/hr. R PIV-SL. Midline incision intact with no new drainage. Continue with plan of care.       Plan of Care Reviewed With: patient    Overall Patient Progress: improvingOverall Patient Progress: improving

## 2024-12-21 NOTE — PLAN OF CARE
Goal Outcome Evaluation:      Plan of Care Reviewed With: patient    Overall Patient Progress: improvingOverall Patient Progress: improving    Outcome Evaluation: [id number]. Pt A&Ox4. VSS on RA, sinus tachy 110's-120's. Started on Q6 IV metoprolol. NG removed. Remains NPO except for ice chips. PCA dilaudid pump in use but pt still rating pain 8-10/10. L PIV infusing PCA and NS @ 125 mL/hr. Was reluctant to work with therapy but felt relief after standing up and being able to burp. Continue POC.

## 2024-12-21 NOTE — SUMMARY OF CARE
Reason for admission: SBO  Admitted from:  PACU  Report received from: Valeri MELCHOR 2 RN skin assessment completed by: pt refused      - Findings (add LDA if needed):   Bed algorithm reevaluated:   Was airflow pump ordered?: n/a  Suction set up in room? yes  Care plan (primary problem) and education initiated: yes  MDRO education done if applicable: yes  Pt informed about policy regarding no IV pumps off unit: yes  Flu shot ordered? (October-April only): no  Detailed Belongings: jacket, shoes, clothing

## 2024-12-21 NOTE — BRIEF OP NOTE
[facility]    Brief Operative Note    Pre-operative diagnosis: Bowel obstruction (H) [K56.609]  Post-operative diagnosis Same as pre-operative diagnosis    Procedure: Laparotomy exploratory with smal bowel resection., N/A - Abdomen    Surgeon: Surgeons and Role:     * Christiano Obrien MD - Primary     * Kathy Sierra MD - Resident - Assisting     * Bere Howe MD - Resident - Assisting  Anesthesia: General   Estimated Blood Loss: 200 ml    Drains: None  Specimens:   ID Type Source Tests Collected by Time Destination   1 : Small bowel Stricture. Tissue Small Intestine SURGICAL PATHOLOGY EXAM Christiano Obrien MD 12/20/2024 10:39 PM      Findings:   Small bowel stricture causing obstruction, resected ~7cm of small bowel with side to side end to end functional anastomosis .  Complications: None.  Implants: * No implants in log *

## 2024-12-21 NOTE — CONSULTS
"Pain Service Consultation Note  [facility]      Patient Name: Andrew Collier  MRN: [id number]   Age: 59 year old  Sex: male  Date: December 21, 2024                                        Reviewed: Yes    Referring Provider:  Neris Parikh MD  Referring Service:  Internal Medicine  Reason for Consultation: Acute Postoperative Pain    Assessment/Recommendations:  59 year old male with SBO s/p exploratory laparotomy on 12/20/24    Plan:   After discussion of risks/benefits of peripheral nerve block, patient declines the procedure. Wishes to manage pain conservatively at this time.  Given level of discomfort, consider consolidation of all opioids to hydromorphone PCA    Thank you for the opportunity to participate in the care of Andrew Collier  Pain Service will sign off.    Discussed with attending anesthesiologist  Primary Service Contacted with Recommendations? No    Connor Stewart MD  12/21/2024      Chief Complaint:  Abdominal Pain      History of Present Illness:  Andrew Collier is a 59 year old male with a PMH of HIV, methamphetamine use admitted on 12/20/2024 with SBO. Patient taken to OR urgently on 12/20 for exploratory laparotomy and small bowel resection. Had a lengthy discussion with patient about the potential procedure of transversus abdominis plane blocks. After review of risks/benefits, patient declined and wishes to treat his acute surgical pain with \" more dilaudid.\" Currently rates his pain 9-10/10.    Past Medical History:  Past Medical History:   Diagnosis Date    Acute appendicitis with localized peritonitis 1/31/2018    AIDS (H)     Allergic rhinitis due to other allergen     DNS    Anal dysplasia     Chronic abdominal pain     CNS toxoplasmosis (H)     COVID-19 1/11/2022    Diabetes type 2, controlled (H)     GERD (gastroesophageal reflux disease)     Herpes zoster 9/23/2016    History of seizure     History of substance abuse (H)     " "HIV (human immunodeficiency virus infection) (H)     HLD (hyperlipidemia)     Lung nodules     Periungual wart     Pneumonia 1/6/2019    PTSD (post-traumatic stress disorder)     Sleep apnea     doesn't use CPAP         Family History:    Family History   Problem Relation Age of Onset    Diabetes Brother     Diabetes Father     Alzheimer Disease Father     Unknown/Adopted Mother     Diabetes Paternal Grandfather     Cancer No family hx of         no skin cancer    Skin Cancer No family hx of         no famiy hx of skin cancer    Glaucoma No family hx of     Macular Degeneration No family hx of        Social History:  Social History     Tobacco Use    Smoking status: Some Days     Current packs/day: 0.25     Average packs/day: 0.3 packs/day for 40.0 years (10.0 ttl pk-yrs)     Types: Cigarettes    Smokeless tobacco: Former   Substance Use Topics    Alcohol use: No     Alcohol/week: 0.0 standard drinks of alcohol     Comment: Last etoh in 2007         Review of Systems:  Complete ROS reviewed. Unless otherwise noted, all other systems found to be negative.        Laboratory Results:  Recent Labs   Lab Test 12/21/24 0921 12/20/24  1506   INR  --  0.97    435   PTT  --  28   BUN 20.3 20.4       Allergies:  Allergies   Allergen Reactions    Fentanyl Blisters     Per pt, after taking this medication had blisters develope    Tylenol [Acetaminophen] Itching    Dulaglutide Rash    Insulin Lispro Rash     Patient reported    Penicillin V Other (See Comments) and Rash     Diffuse maculopapular rash + feels \"high\", per pt.          Current Pain Related Medications:  Medications related to Pain Management (From now, onward)      Start     Dose/Rate Route Frequency Ordered Stop    12/21/24 0946  HYDROmorphone (PF) (DILAUDID) injection 0.5 mg        Placed in \"Or\" Linked Group    0.5 mg Intravenous EVERY 2 HOURS PRN 12/21/24 0946      12/21/24 0946  HYDROmorphone (DILAUDID) injection 1 mg        Placed in \"Or\" Linked Group " "   1 mg Intravenous EVERY 2 HOURS PRN 12/21/24 0946      12/21/24 0400  Lidocaine (LIDOCARE) 4 % Patch 1 patch         1 patch  over 12 Hours Transdermal EVERY 24 HOURS 2000 12/21/24 0329      12/21/24 0214  oxyCODONE (ROXICODONE) tablet 5 mg         5 mg Oral EVERY 4 HOURS PRN 12/21/24 0216      12/21/24 0144  lidocaine 1 % 0.1-1 mL         0.1-1 mL Other EVERY 1 HOUR PRN 12/21/24 0144      12/21/24 0144  lidocaine (LMX4) cream          Topical EVERY 1 HOUR PRN 12/21/24 0144                Physical Exam:  Vitals: BP (!) 145/84 (BP Location: Right arm)   Pulse (!) 139   Temp 99.9  F (37.7  C) (Oral)   Resp 19   SpO2 95%     Physical Exam:   CONSTITUTIONAL/GENERAL APPEARANCE:  In visible discomfort  EYES: EOMI, sclera anicteric, PERRLA  ENT/NECK: atraumatic, lips and oral mucous membranes dry  RESPIRATORY: non-labored breathing  CV: RRR  ABDOMEN: Tender to palpation. Surgical dressing visible.  MUSCULOSKELETAL/BACK/SPINE/EXTREMITIES: Moves all extremities purposefully  NEURO: Alert and Oriented x3. Answers questions appropriately  SKIN/VASCULAR EXAM:  No jaundice, no visible rashes or lesions      Please see A&P for additional details of medical decision making.      Acute Inpatient Pain Service [facility]  Hours of pain coverage 24/7   Page via Amcom- Please Page the Pain Team Via Amcom: \"PAIN MANAGEMENT ACUTE INPATIENT/ [facility]\"            "

## 2024-12-21 NOTE — H&P
[facility]    History and Physical - Medicine Service, [facility] TEAM        Date of Admission:  12/20/2024    Assessment & Plan      Andrew Collier is a 59 year old male admitted on 12/20/2024. He has a history of HIV on Biktarvy , DM2 , methamphetamine use , multiple admissions with SBO of unknown cause ( most recent on 12/17-12/19) managed non surgically with NG tube LIS  , presented with complaints of abdominal pain , found to have SBO     #SBO   Has history of recurrent SBOs , most recent on 12/17-12/19 managed non surgically with NG tube LIS. Presented with abdominal pain in LLQ radiating with mild nausea . No vomiting . Got discharged yesterday ate a sandwich . This morning after eating some chicken   developed the above symptoms. Last BM yesterday night. Coming in , afebrile, hemodynamically stable , WBC 7.5 , lipase 57 , LFTs WNL, UA shows no evidence of infection. Abdominal XR shows  no definite pneumoperitoneum. Mild to moderate colonic stool burden. CT Abd pelvis W contrast shows small bowel obstruction with a dilated bowel  loop measuring up to 4.7 cm with suspicion of developing closed loop obstruction , similar to admission before.   -Surgery consulted . Taking the patient to OR right now for exploratory laparotomy.    #Diabetes mellitus type 2   #Hyperglycemia   Coming in , BG elevated to 392. . Per patient , he takes his home insulin regularly which is 27U glargine BID and aspart 10U TID with meals . Last insulin he took was glargine this morning and aspart with the meal .  -Going to the OR right now , will be managed by surgery . Manage once patient back from OR.     # Chronic conditions without changes  # HIV: Continue PTA Biktarvy  # History of asthma: Albuterol inhaler as needed            Diet: NPO for Medical/Clinical Reasons Except for: No Exceptions    DVT Prophylaxis: Low Risk/Ambulatory with no VTE prophylaxis indicated  Potts Catheter:  Not present  Lines: None     Cardiac Monitoring: None  Code Status: Full Code      Clinically Significant Risk Factors Present on Admission         # Hyponatremia: Lowest Na = 133 mmol/L in last 2 days, will monitor as appropriate                    # DMII: A1C = 10.5 % (Ref range: <5.7 %) within past 6 months       # Financial/Environmental Concerns:    # Support System: poor social support noted in nursing assessment  # Housing Instability: noted in nursing assessment         Disposition Plan     Medically Ready for Discharge: Anticipated in 2-4 Days         The patient's care was discussed with the Attending Physician, Dr. Garza  .    Travis Varela MD  Medicine Service, [facility]  Securely message with [facility] (more info)  Text page via [facility] Paging/Directory   See signed in provider for up to date coverage information    ______________________________________________________________________    Chief Complaint   Abdominal pain , nausea     History is obtained from the patient    History of Present Illness   Andrew Collier is a 59 year old male admitted on 12/20/2024. He has a history of HIV on Biktarvy , DM2 , methamphetamine use , multiple admissions with SBO of uknown cause ( most recent on on 12/17-12/19) , presented with complaints of abdominal pain and nausea . Pain started yesterday after he ate chicken this AM. Located in left lower quadrant .Mild nausea without any vomiting  . Last BM was yesterday night . Patient said that this feels very similar to his previous episodes of SBO. Says that these episodes started when he started taking ozempic 8 years ago . History of appendectomy 5 years ago. No fever , chills , dysuria , headache , shortness of breath  , chest pain . Coming in , WBC 7.4 , HB 13.4 , UA with no evidence of infection , shows glucose . LFTs normal , lipase 57. VBG normal. Abd XR shows . No definite pneumoperitoneum. Mild  to moderate colonic stool burdenCT Abd with contrast shows Dilated loop of small bowel measuring up to 4.7 cm in the central abdomen compatible with bowel obstruction; with 2 apparent transition  points, developing closed-loop obstruction not entirely excluded. Nopneumatosis, mesenteric venous gas or portal venous gas.      Past Medical History    Past Medical History:   Diagnosis Date    Acute appendicitis with localized peritonitis 1/31/2018    AIDS (H)     Allergic rhinitis due to other allergen     DNS    Anal dysplasia     Chronic abdominal pain     CNS toxoplasmosis (H)     COVID-19 1/11/2022    Diabetes type 2, controlled (H)     GERD (gastroesophageal reflux disease)     Herpes zoster 9/23/2016    History of seizure     History of substance abuse (H)     HIV (human immunodeficiency virus infection) (H)     HLD (hyperlipidemia)     Lung nodules     Periungual wart     Pneumonia 1/6/2019    PTSD (post-traumatic stress disorder)     Sleep apnea     doesn't use CPAP       Past Surgical History   Past Surgical History:   Procedure Laterality Date    ANOSCOPY N/A 9/9/2020    Procedure: Exam Under Anesthesia, ANOSCOPY, fulgeration of rectal fissures with Rectal Biopsies;  Surgeon: Thanh Lundberg MD;  Location: [facility] OR    COLONOSCOPY Left 1/22/2016    Procedure: COMBINED COLONOSCOPY, SINGLE OR MULTIPLE BIOPSY/POLYPECTOMY BY BIOPSY;  Surgeon: Clark Saini MD;  Location: [facility] GI    ESOPHAGOSCOPY, GASTROSCOPY, DUODENOSCOPY (EGD), COMBINED N/A 6/6/2022    Procedure: ESOPHAGOGASTRODUODENOSCOPY, WITH BIOPSY;  Surgeon: Kecia Benítez MD;  Location: [facility]    ESOPHAGOSCOPY, GASTROSCOPY, DUODENOSCOPY (EGD), COMBINED N/A 10/10/2024    Procedure: Esophagoscopy, gastroscopy, duodenoscopy (EGD), combined;  Surgeon: Nico Bhatti DO;  Location: [facility]    HC EXPLORE UNDESC TESTIS,INGUIN/SCROTAL      LAPAROSCOPIC APPENDECTOMY N/A 1/31/2018    Procedure: LAPAROSCOPIC APPENDECTOMY;  LAPAROSCOPIC APPENDECTOMY;  Surgeon:  Dawn Holt MD;  Location: [facility] OR    LAPAROSCOPY DIAGNOSTIC (GENERAL) N/A 2016    Procedure: LAPAROSCOPY DIAGNOSTIC (GENERAL);  Surgeon: Susannah Arriaga MD;  Location: [facility]    LAPAROSCOPY DIAGNOSTIC (GENERAL) N/A 2018    Procedure: LAPAROSCOPY DIAGNOSTIC (GENERAL);  Diagnostic laparoscopy and lysis of adhesions;  Surgeon: Prince Dowling MD;  Location: [facility] OR    OPTICAL TRACKING SYSTEM CRANIOTOMY, EXCISE TUMOR, COMBINED Left 4/10/2015    Procedure: COMBINED OPTICAL TRACKING SYSTEM CRANIOTOMY, EXCISE TUMOR;  Surgeon: Mirlande Colmenares MD;  Location: [facility] OR    REPAIR GAMEKEEPER'S THUMB Right 2016    Procedure: REPAIR LIGAMENT ULNAR COLLATERAL THUMB (GAMEKEEPER'S);  Surgeon: Evin Zamorano MD;  Location: [facility] OR    ZZC NONSPECIFIC PROCEDURE      right forearm fracture       Prior to Admission Medications   Prior to Admission Medications   Prescriptions Last Dose Informant Patient Reported? Taking?   Continuous Glucose Sensor (DEXCOM G6 SENSOR) MISC   No No   Si each every 10 days Change every 10 days.   Continuous Glucose Sensor (DEXCOM G7 SENSOR) MISC   No No   Si Device every 10 days.   Continuous Glucose Transmitter (DEXCOM G6 TRANSMITTER) MISC   No No   Si each every 3 months Change every 3 months.   Nutritional Supplements (ENSURE ACTIVE) LIQD   No No   Sig: Take 414 mLs by mouth daily   albuterol (PROAIR HFA/PROVENTIL HFA/VENTOLIN HFA) 108 (90 Base) MCG/ACT inhaler   No No   Sig: Inhale 2 puffs into the lungs every 6 hours as needed for shortness of breath, wheezing or cough.   albuterol (PROAIR HFA/PROVENTIL HFA/VENTOLIN HFA) 108 (90 Base) MCG/ACT inhaler   No No   Sig: Inhale 2 puffs into the lungs every 6 hours as needed for shortness of breath, wheezing or cough.   bictegravir-emtricitabine-tenofovir (BIKTARVY) -25 MG per tablet   No No   Sig: Take 1 tablet by mouth daily.   blood glucose (NO BRAND SPECIFIED) lancets standard   No No   Sig: Use  to test blood sugar 1 time daily or as directed.   blood glucose (NO BRAND SPECIFIED) test strip   No No   Sig: Use to test blood sugar 1 time daily or as directed.   blood glucose monitoring (NO BRAND SPECIFIED) meter device kit   No No   Sig: Use to test blood sugar 3 times daily or as directed.   insulin aspart (NOVOLOG PEN) 100 UNIT/ML pen   No No   Sig: Inject 10 Units subcutaneously 3 times daily (with meals). Take only when you have access to a meal.   insulin glargine (LANTUS PEN) 100 UNIT/ML pen   No No   Sig: Inject 25 Units subcutaneously every morning. REDUCE TO 15 UNITS IF YOU THINK YOU WILL NOT HAVE FOOD THAT DAY.   insulin pen needle (32G X 4 MM) 32G X 4 MM miscellaneous   No No   Sig: Use 4-5 pen needles daily or as directed.   naproxen (NAPROSYN) 500 MG tablet   No No   Sig: Take 1 tablet (500 mg) by mouth 2 times daily as needed for moderate pain. Take with meals.   pantoprazole (PROTONIX) 40 MG EC tablet   No No   Sig: Take 1 tablet (40 mg) by mouth 2 times daily (before meals).   polymixin b-trimethoprim (POLYTRIM) 02435-4.1 UNIT/ML-% ophthalmic solution   No No   Sig: Place 2 drops Into the left eye every 2 hours (while awake).      Facility-Administered Medications: None          Physical Exam   Vital Signs: Temp: 97.8  F (36.6  C) Temp src: Oral BP: (!) 121/97 Pulse: 85   Resp: 17 SpO2: 97 % O2 Device: None (Room air)    Weight: 0 lbs 0 oz    General : In moderate pain , stable   CVS : S1S2 heard , no murmurs   RS: B/l NVBS heard   Abd : soft , tenderness present in LLQ , no rebound , rigidity   Extremity : No edema   CNS : Alert and oriented to time place and person . No FND     Medical Decision Making           Data     I have personally reviewed the following data over the past 24 hrs:    7.5  \   13.4   / 435     133 (L) 98 20.4 /  288 (H)   4.4 23 0.82 \     ALT: 12 AST: 23 AP: 132 TBILI: 0.6   ALB: 4.1 TOT PROTEIN: 7.6 LIPASE: 57     Trop: 10 BNP: N/A     Procal: N/A CRP: <3.00 Lactic  Acid: 1.1       INR:  0.97 PTT:  28   D-dimer:  N/A Fibrinogen:  N/A       Imaging results reviewed over the past 24 hrs:   Recent Results (from the past 24 hours)   Abdomen XR, 2 vw, flat and upright    Narrative    EXAMINATION: XR ABDOMEN 2 VIEWS  12/20/2024 1:56 PM      CLINICAL HISTORY: luq pain hx of recurrent sbo eval and also for free  air etc    COMPARISON: 12/18/2024    FINDINGS:    Supine and upright frontal x-ray of the abdomen.Nonspecific bowel gas  pattern. Relative paucity of bowel gas. No definite pneumoperitoneum.  Mild to moderate colonic stool burden. Focal metallic opacity projects  over the right lower quadrant similar to prior radiograph from  12/18/2024, indeterminate, possibly ingested.      Impression    IMPRESSION:   1. Nonspecific bowel gas pattern. Relative paucity of bowel gas. No  definite pneumoperitoneum. Mild to moderate colonic stool burden.  2. Focal metallic opacity projects over the right lower quadrant  similar to prior radiograph from 12/18/2024, indeterminate, possibly  ingested/postprocedure. Consider correlation.    I have personally reviewed the examination and initial interpretation  and I agree with the findings.    LAURYN FOURNIER MD         SYSTEM ID:  [id number]   CT Abdomen Pelvis w/o Contrast    Narrative    EXAMINATION: CT ABDOMEN PELVIS W/O CONTRAST, 12/20/2024 5:10 PM    INDICATION: eval for recurrent sbo    COMPARISON STUDY: 10/9/2024    TECHNIQUE: CT scan of the abdomen and pelvis was performed on  multidetector CT scanner using volumetric acquisition technique and  images were reconstructed in multiple planes with variable thickness  and reviewed on dedicated workstations.     CONTRAST: None   CT scan radiation dose is optimized to minimum requisite dose using  automated dose modulation techniques.    FINDINGS:    Lower thorax: Normal.    Liver: No mass. No intrahepatic biliary ductal dilation.    Biliary System: Cholelithiasis without cholecystitis.    Pancreas:  No mass or pancreatic ductal dilation.    Adrenal glands: No mass or nodules    Spleen: Normal.    Kidneys: No suspicious mass, obstructing calculus or hydronephrosis.    Gastrointestinal tract : Moderate thickening and distal esophagus.  Distention of the proximal stomach. Single dilated loop of small bowel  measuring up to 3.9 cm, for example series 2 image 45. This is  slightly increased in caliber compared to prior however decrease  portion of small bowel involved. Decompressed distal small bowel.  There is stool within the colon. No pneumatosis, mesenteric venous gas  or pneumoperitoneum. Normal appendix.    Mesentery/peritoneum/retroperitoneum: No mass. No free fluid or air.    Lymph nodes: No significant lymphadenopathy.    Vasculature: Patent major abdominal vasculature.    Pelvis: Urinary bladder is normal.  Penile pump in appropriate  position.  Prostatomegaly measuring 5.6 cm in transverse dimension.    Osseous structures: No aggressive or acute osseous lesion.      Soft tissues: Within normal limits.  Unchanged nodular density in the  anterior right lower quadrant subcutaneous soft tissue.      Impression    IMPRESSION:     Dilated loop of small bowel measuring up to 4.7 cm in the central  abdomen compatible with bowel obstruction; with 2 apparent transition  points, developing closed-loop obstruction not entirely excluded. No  pneumatosis, mesenteric venous gas or portal venous gas.

## 2024-12-21 NOTE — PROGRESS NOTES
12/21/24 1500   Appointment Info   Signing Clinician's Name / Credentials (PT) Devika Dempsey   Living Environment   People in Home alone   Current Living Arrangements homeless   Transportation Anticipated public transportation   Living Environment Comments Patient has been homeless but most recently he was living in a [facility] treatment facility. His current plan is to return here at discharge   Self-Care   Usual Activity Tolerance good   Current Activity Tolerance moderate   Equipment Currently Used at Home none   Fall history within last six months yes   Number of times patient has fallen within last six months   (Yes per chart but not specified)   Activity/Exercise/Self-Care Comment Patient reports he walks at baseline without an AD but currently would like to use a walker given his pain. patient is independent typically with ADLS .   General Information   Onset of Illness/Injury or Date of Surgery 12/20/24   Referring Physician Luigi Mcadams   Patient/Family Therapy Goals Statement (PT) Get pain under control   Pertinent History of Current Problem (include personal factors and/or comorbidities that impact the POC) 59 year old male with SBO s/p exploratory laparotomy on 12/20/24 due to SBO   Existing Precautions/Restrictions abdominal   Heart Disease Risk Factors Medical history;Gender;Age   General Observations Patient with PCA pain pump.   Cognition   Affect/Mental Status (Cognition) WNL   Pain Assessment   Patient Currently in Pain Yes, see Vital Sign flowsheet   Strength (Manual Muscle Testing)   Strength Comments At least 3/5 throughout per functional transfers   Bed Mobility   Comment, (Bed Mobility) Rolls with CGA to min A but does not follow logroll despite verbal cues   Transfers   Comment, (Transfers) Stands with CGA only with BUE support on walker   Gait/Stairs (Locomotion)   Comment, (Gait/Stairs) Ambulates with primarily a step to gait pattern with CGA and fww   Balance   Balance Comments Good  static standing balance   Sensory Examination   Sensory Perception Comments NT   Clinical Impression   Criteria for Skilled Therapeutic Intervention Yes, treatment indicated   PT Diagnosis (PT) Impaired functional mobility   Influenced by the following impairments pain   Functional limitations due to impairments bed mobility, transfers, gait and stairs   Clinical Presentation (PT Evaluation Complexity) evolving   Clinical Presentation Rationale Pain control   Clinical Decision Making (Complexity) low complexity   Planned Therapy Interventions (PT) bed mobility training;gait training;neuromuscular re-education;stair training;strengthening;transfer training;progressive activity/exercise;risk factor education;home program guidelines   Risk & Benefits of therapy have been explained evaluation/treatment results reviewed;patient   Clinical Impression Comments Patient presents with high pain and low activity tolerance. Anticipate once pain is better controlled, will be independently with mobility. Anticipate return to  treatment facility will be the best DC plan for this patient's goals.   PT Total Evaluation Time   PT Etta Low Complexity Minutes (28982) 5   Physical Therapy Goals   PT Frequency 5x/week   PT Predicted Duration/Target Date for Goal Attainment 12/28/24   PT Goals Bed Mobility;Transfers;Gait;Stairs   PT: Bed Mobility Independent;Within precautions;Supine to/from sit   PT: Transfers Independent;Sit to/from stand;Within precautions   PT: Gait Independent;Greater than 200 feet   PT: Stairs Independent;Greater than 10 stairs  (for community mobility)   Interventions   Interventions Quick Adds Gait Training;Therapeutic Activity   Therapeutic Activity   Therapeutic Activities: dynamic activities to improve functional performance Minutes (97902) 8   Treatment Detail/Skilled Intervention PT:Patient greeted supine in bed having recently switched to PCA pump. Patient declining mobility completely initally- with  "probes, pt reports much of his discomfort is due to bowel gas. Discussed how walking helps with GI motility and may reduce bowel gas and was agreeable to trial sitting up. Pt instructed briefly in logroll technique but declines and says \"I'll do it myself\"- does a modified logroll but has some strain on abdomen with his method. Patient stands up with CGA and BUE support on walker.HR at rest is 125 bpm.Following ambulation, patient returns to room and asks to lie down immediately. Needs CGA to get LEs int othe bed but followings logroll well. Patient scoots up in bed with verbal cues for technique independently.   Gait Training   Gait Training Minutes (58827) 5   Treatment Detail/Skilled Intervention PT: Patient ambulated ~45 ft with fww and close SBA. Demonstrates mostly step to pattern in hallway with R leg leading - reports he is doing this due to pain. Patient able to turn around with CGA for steadying and return to room- HR up to 135 bpm with activity. RN aware. BP high following activity at 169/118. RN alerted.   PT Discharge Planning   PT Plan PT: gait, trial of stairs for community mobility. go over abdominal precautions   PT Discharge Recommendation (DC Rec) other (see comments)  (CD treatment)   Physical Therapy Time and Intention   Timed Code Treatment Minutes 13   Total Session Time (sum of timed and untimed services) 18       "

## 2024-12-21 NOTE — CONSULTS
Care Management Initial Consult    General Information  Assessment completed with: Patient   Type of CM/SW Visit: Initial Assessment  Primary Care Provider verified and updated as needed: No   Readmission within the last 30 days: current reason for admission unrelated to previous admission   Reason for Consult: utilization management concerns, ERS, SDOH need  Advance Care Planning: Advance Care Planning Reviewed: no concerns identified        Communication Assessment  Patient's communication style: spoken language (English or Bilingual)    Hearing Difficulty or Deaf: no   Wear Glasses or Blind: no    Cognitive  Cognitive/Neuro/Behavioral: WDL    Level of Consciousness: alert    Arousal Level: opens eyes spontaneously    Orientation: oriented x 4          Speech: clear, spontaneous, logical    Living Environment:   People in home: alone     Current living Arrangements: homeless      Able to return to prior arrangements: yes     Family/Social Support:  Care provided by: self  Provides care for: no one  Marital Status: Single  Support system: None          Description of Support System: Uninvolved    Support Assessment: Inadequate interpersonal communication skills, Difficulty establishing and maintaining relationships, Minimal outside structure and leisure time activities    Current Resources:   Patient receiving home care services: No  Community Resources: None  Equipment currently used at home: none  Supplies currently used at home: Diabetic Supplies    Employment/Financial:  Employment Status: unemployed     Financial Concerns: unemployed   Referral to Financial Worker: No  Does the patient's insurance plan have a 3 day qualifying hospital stay waiver?  No    Lifestyle & Psychosocial Needs:  Social Drivers of Health     Food Insecurity: Low Risk  (12/21/2024)    Food Insecurity     Within the past 12 months, did you worry that your food would run out before you got money to buy more?: No     Within the past 12  months, did the food you bought just not last and you didn t have money to get more?: No   Recent Concern: Food Insecurity - High Risk (10/24/2024)    Food Insecurity     Within the past 12 months, did you worry that your food would run out before you got money to buy more?: Yes     Within the past 12 months, did the food you bought just not last and you didn t have money to get more?: Yes   Depression: Not at risk (8/27/2020)    PHQ-2     PHQ-2 Score: 0   Housing Stability: High Risk (12/21/2024)    Housing Stability     Do you have housing? : No     Are you worried about losing your housing?: Yes   Tobacco Use: High Risk (12/12/2024)    Patient History     Smoking Tobacco Use: Some Days     Smokeless Tobacco Use: Former     Passive Exposure: Not on file   Financial Resource Strain: High Risk (12/21/2024)    Financial Resource Strain     Within the past 12 months, have you or your family members you live with been unable to get utilities (heat, electricity) when it was really needed?: Yes   Alcohol Use: Unknown (8/27/2020)    AUDIT-C     Frequency of Alcohol Consumption: Not on file     Average Number of Drinks: Patient declined     Frequency of Binge Drinking: Not on file   Transportation Needs: High Risk (12/21/2024)    Transportation Needs     Within the past 12 months, has lack of transportation kept you from medical appointments, getting your medicines, non-medical meetings or appointments, work, or from getting things that you need?: Yes   Physical Activity: Not on file   Interpersonal Safety: Low Risk  (12/21/2024)    Interpersonal Safety     Do you feel physically and emotionally safe where you currently live?: Yes     Within the past 12 months, have you been hit, slapped, kicked or otherwise physically hurt by someone?: No     Within the past 12 months, have you been humiliated or emotionally abused in other ways by your partner or ex-partner?: No   Recent Concern: Interpersonal Safety - High Risk  (10/24/2024)    Interpersonal Safety     Do you feel physically and emotionally safe where you currently live?: No     Within the past 12 months, have you been hit, slapped, kicked or otherwise physically hurt by someone?: No     Within the past 12 months, have you been humiliated or emotionally abused in other ways by your partner or ex-partner?: No   Stress: Not on file   Social Connections: Unknown (4/10/2023)    [facility]    Social Connections     Frequency of Communication with Friends and Family: Not on file   Health Literacy: Not on file     Functional Status:  Prior to admission patient needed assistance:   Dependent ADLs:: Independent  Dependent IADLs:: Independent  Assesssment of Functional Status: Near baseline    Mental Health Status:  Mental Health Status: No Current Concerns       Chemical Dependency Status:  Chemical Dependency Status: Current Concern    Chemical Dependency Management: Previous treatment        Values/Beliefs:  Spiritual, Cultural Beliefs, Alevism Practices, Values that affect care: no             Discussed  Partnership in Safe Discharge Planning  document with patient/family: No    Additional Information:  Care management/ Social Work IP Consult acknowleged. Team consulted d/t ERS & SDOH concerns. Chart reviewed. Pt well known to this writer and the [facility] Care Management Team. Pt has had 30 ED visits and 5 hospitalizations in the past year with most recent hospital discharge on 12/19/2024 (2 days ago). Has history of leaving the hospital AMA. Pt identified as high risk for food insecurity, housing stability, financial resource strain, transportation need, and tobacco use. Pt unhoused without PCP or health care directive, despite previous efforts by care management to assist pt. Pt has always declined adding emergency contacts. Efforts have been made in the  past to get the pt connected to  "amulatory care management after his hospital stay, however the pt is generally unreachable by phone.    This writer met with patient at bedside to complete the care management assessment. Writer introduced self and the role of the SW & RNCC. SW verified address (pt is unhoused and living on \"the streets\"), PCP (pt has no PCP & refuses to be set up with one), emergency contacts (none), and health insurance ([employer] [facility] & [employer]). He continues to decline creation of a health care directive. He has insurance, but no other services, workers, or sources of income. He reported being aware of community resources to meet his basic needs and how to navigate the shelter system. Pt is independent at baseline for all ADLs & IADLs. No mental health concerns identified.     Pt indicated he admitted to [facility] from [facility], which is a  Treatment center. Andrew signed a limited MAGGY so that writer can let the [facility] know that he is hospitalized and would like to return once medically ready. Andrew requested that the specifics of his hospitalization not be communicated to [facility].  MAGGY was faxed to HIM for upload into the EMR. Writer drafted a brief letter indicating pt's dates of hospitalization and that he would like to return to [facility] at discharge and sent that and the MAGGY to [facility] (F: [phone]).    Writer placed unhoused and chemical health resources in pt's AVS discharge instructions. SW provided means of contacting the unit care management team & encouraged pt to reach out with any needs, questions, or concerns.      Next Steps: Care management team will follow this pt and coordinate return to [facility] at discharge. Should [facility] need any medical records before pt readmits, pt will need to agree to sign a different release.    Discharge Resources:    [facility] (Ph: [phone] ; F: [phone])  [address], [place], [place] " "[id number]\  PTA IP [facility]    Elena Velásquez, RADHA, JOMAR  Clinical , [facility]-7[facility]  Desk Phone: [phone]   Schedule: Wednesday, Thursday, Fridays (for Mondays & Tuesdays, contact job share partner Sharon Nguyen)  Securely message with [employer] (Search Name or 7C Med Surg [phone] SW)  Text page via [facility] Paging/Directory   See signed in provider for up to date coverage information  Social Work & Care Management Department    Weekend And After Hours Care Management Contacts:  After Hours Social Work Coverage [phone] daily: Searchable in Vocera \"Adult SW After Hours On Call\"   Weekend Coverage [phone]: Searchable in Vocera \"7C Med Surg SW\" or \"7C Med Surg RNCC\"    "

## 2024-12-21 NOTE — ANESTHESIA PREPROCEDURE EVALUATION
Anesthesia Pre-Procedure Evaluation    Patient: Andrew Collier   MRN: [id number] : 1965        Procedure : Procedure(s):  Laparotomy exploratory          Past Medical History:   Diagnosis Date    Acute appendicitis with localized peritonitis 2018    AIDS (H)     Allergic rhinitis due to other allergen     DNS    Anal dysplasia     Chronic abdominal pain     CNS toxoplasmosis (H)     COVID-19 2022    Diabetes type 2, controlled (H)     GERD (gastroesophageal reflux disease)     Herpes zoster 2016    History of seizure     History of substance abuse (H)     HIV (human immunodeficiency virus infection) (H)     HLD (hyperlipidemia)     Lung nodules     Periungual wart     Pneumonia 2019    PTSD (post-traumatic stress disorder)     Sleep apnea     doesn't use CPAP      Past Surgical History:   Procedure Laterality Date    ANOSCOPY N/A 2020    Procedure: Exam Under Anesthesia, ANOSCOPY, fulgeration of rectal fissures with Rectal Biopsies;  Surgeon: Thanh Lundberg MD;  Location: [facility] OR    COLONOSCOPY Left 2016    Procedure: COMBINED COLONOSCOPY, SINGLE OR MULTIPLE BIOPSY/POLYPECTOMY BY BIOPSY;  Surgeon: Clark Saini MD;  Location: [facility] GI    ESOPHAGOSCOPY, GASTROSCOPY, DUODENOSCOPY (EGD), COMBINED N/A 2022    Procedure: ESOPHAGOGASTRODUODENOSCOPY, WITH BIOPSY;  Surgeon: Kecia Benítez MD;  Location: [facility] GI    ESOPHAGOSCOPY, GASTROSCOPY, DUODENOSCOPY (EGD), COMBINED N/A 10/10/2024    Procedure: Esophagoscopy, gastroscopy, duodenoscopy (EGD), combined;  Surgeon: Nico Bhatti DO;  Location: [facility] GI    HC EXPLORE UNDESC TESTIS,INGUIN/SCROTAL      LAPAROSCOPIC APPENDECTOMY N/A 2018    Procedure: LAPAROSCOPIC APPENDECTOMY;  LAPAROSCOPIC APPENDECTOMY;  Surgeon: Dawn Holt MD;  Location: [facility]    LAPAROSCOPY DIAGNOSTIC (GENERAL) N/A 2016    Procedure: LAPAROSCOPY DIAGNOSTIC (GENERAL);  Surgeon: Susannah Arriaga MD;  Location: [facility] OR     "LAPAROSCOPY DIAGNOSTIC (GENERAL) N/A 4/16/2018    Procedure: LAPAROSCOPY DIAGNOSTIC (GENERAL);  Diagnostic laparoscopy and lysis of adhesions;  Surgeon: Prince Dowling MD;  Location: [facility] OR    OPTICAL TRACKING SYSTEM CRANIOTOMY, EXCISE TUMOR, COMBINED Left 4/10/2015    Procedure: COMBINED OPTICAL TRACKING SYSTEM CRANIOTOMY, EXCISE TUMOR;  Surgeon: Mirlande Colmenares MD;  Location: [facility] OR    REPAIR GAMEKEEPER'S THUMB Right 12/2/2016    Procedure: REPAIR LIGAMENT ULNAR COLLATERAL THUMB (GAMEKEEPER'S);  Surgeon: Evin Zamorano MD;  Location:  OR    [facility] NONSPECIFIC PROCEDURE      right forearm fracture      Allergies   Allergen Reactions    Fentanyl Blisters     Per pt, after taking this medication had blisters develope    Tylenol [Acetaminophen] Itching    Dulaglutide Rash    Insulin Lispro Rash     Patient reported    Penicillin V Other (See Comments) and Rash     Diffuse maculopapular rash + feels \"high\", per pt.       Social History     Tobacco Use    Smoking status: Some Days     Current packs/day: 0.25     Average packs/day: 0.3 packs/day for 40.0 years (10.0 ttl pk-yrs)     Types: Cigarettes    Smokeless tobacco: Former   Substance Use Topics    Alcohol use: No     Alcohol/week: 0.0 standard drinks of alcohol     Comment: Last etoh in 2007      Wt Readings from Last 1 Encounters:   12/12/24 72.1 kg (159 lb)        Anesthesia Evaluation   Pt has had prior anesthetic. Type: General and MAC.    No history of anesthetic complications       ROS/MED HX  ENT/Pulmonary:     (+) sleep apnea, doesn't use CPAP,                                      Neurologic:     (+)       seizures,                         Cardiovascular:     (+) Dyslipidemia - -   -  - -                                      METS/Exercise Tolerance:     Hematologic:       Musculoskeletal:       GI/Hepatic: Comment: C/f SBO   Hx of multiple SBO    (+) GERD,                   Renal/Genitourinary:       Endo:     (+)  type II DM,                  "   Psychiatric/Substance Use:     (+) psychiatric history   H/O chronic opiod use . Recreational drug usage: Meth.    Infectious Disease: Comment: HIV        Malignancy:       Other:            Physical Exam    Airway        Mallampati: II   TM distance: > 3 FB   Neck ROM: full   Mouth opening: > 3 cm    Respiratory Devices and Support         Dental       (+) Multiple visibly decayed, broken teeth      Cardiovascular   cardiovascular exam normal          Pulmonary   pulmonary exam normal                OUTSIDE LABS:  CBC:   Lab Results   Component Value Date    WBC 7.5 12/20/2024    WBC 6.8 12/18/2024    HGB 13.4 12/20/2024    HGB 12.3 (L) 12/18/2024    HCT 39.6 (L) 12/20/2024    HCT 36.5 (L) 12/18/2024     12/20/2024     12/18/2024     BMP:   Lab Results   Component Value Date     (L) 12/20/2024     12/18/2024    POTASSIUM 4.4 12/20/2024    POTASSIUM 4.0 12/18/2024    CHLORIDE 98 12/20/2024    CHLORIDE 103 12/18/2024    CO2 23 12/20/2024    CO2 23 12/18/2024    BUN 20.4 12/20/2024    BUN 15.1 12/18/2024    CR 0.82 12/20/2024    CR 0.72 12/18/2024     (H) 12/20/2024     (H) 12/19/2024     COAGS:   Lab Results   Component Value Date    PTT 28 12/20/2024    INR 0.97 12/20/2024    FIBR 371 10/10/2024     POC:   Lab Results   Component Value Date     (H) 03/10/2021     HEPATIC:   Lab Results   Component Value Date    ALBUMIN 4.1 12/20/2024    PROTTOTAL 7.6 12/20/2024    ALT 12 12/20/2024    AST 23 12/20/2024    ALKPHOS 132 12/20/2024    BILITOTAL 0.6 12/20/2024     OTHER:   Lab Results   Component Value Date    PH 7.39 12/23/2022    LACT 1.1 12/20/2024    A1C 10.5 (H) 10/23/2024    LINDA 10.0 12/20/2024    PHOS 2.8 11/21/2024    MAG 4.3 (H) 11/21/2024    LIPASE 57 12/20/2024    AMYLASE 50 08/24/2021    TSH 0.87 09/25/2020    T4 1.02 04/14/2015    CRP 5.6 02/19/2022    SED 10 12/19/2020       Anesthesia Plan    ASA Status:  3, emergent    NPO Status:  NPO Appropriate     Anesthesia Type: General.     - Airway: ETT   Induction: Intravenous, RSI, Propofol.   Maintenance: Balanced.   Techniques and Equipment:     - Lines/Monitors: 2nd IV, BIS     Consents            Postoperative Care    Pain management: IV analgesics, Oral pain medications, Multi-modal analgesia.   PONV prophylaxis: Ondansetron (or other 5HT-3), Dexamethasone or Solumedrol     Comments:               Valeri Oro DO    I have reviewed the pertinent notes and labs in the chart from the past 30 days and (re)examined the patient.  Any updates or changes from those notes are reflected in this note.     # Hyponatremia: Lowest Na = 133 mmol/L in last 2 days, will monitor as appropriate                    # DMII: A1C = 10.5 % (Ref range: <5.7 %) within past 6 months       # Financial/Environmental Concerns:    # Support System: poor social support noted in nursing assessment  # Housing Instability: noted in nursing assessment

## 2024-12-21 NOTE — PLAN OF CARE
Goal Outcome Evaluation:      Plan of Care Reviewed With: patient    Overall Patient Progress: improvingOverall Patient Progress: improving    Outcome Evaluation: Once pt is med ready and provided he is still independent with ADLs & IADLs, pt will return to [facility] [place].    RADHA Kamara, LICSW

## 2024-12-21 NOTE — CONSULTS
"    [facility]    Consult  - Emergency General Surgery Service       Date of Admission:  12/20/2024    Assessment & Plan: Surgery   59 year old male with history significant for recurrent SBO, poorly controlled T2DM, HIV on ART, and recent admission for SBO managed conservatively with decompression and gastrograffin challenge presenting to the ED with recurrent abdominal pain. Review of CT imaging with isolated segment of dilated bowel concerning for closed loop obstruction. In setting of multiple previous admissions for obstruction, exploratory surgery was offered. Risks/benefits discussed and patient agreed to proceed to the OR urgently for exploratory laparotomy.      - NPO, mIVF, continue antibiotics  - to OR tonight for exlap, possible bowel resection  - agree with medicine admission  - Surgery following closely    The patient's care was discussed with the Attending Physician, Dr. Obrien and Chief Resident/Fellow.    Bere Howe MD  [facility]  All communications related to this note should be expressed to resident/KASSY on call for this team at the time of your communication. Please be advised that not all members of this team utilize vocera.  ______________________________________________________________________    Chief Complaint   Abdominal Pain    History is obtained from the patient    History of Present Illness   Andrew Collier is a 59 year old male with history of diabetes, HIV, and recurrent SBO presenting to the ED with recurrent LLQ abdominal pain, that he terms a \"bowel obstruction\". Patient last had a bowel movement yesterday. Pain is similar to that at previous admission, though slightly more intense. He last ate at noon earlier today. States that the food \"just sat there\". Denies nausea/vomiting. Denies fevers/chills. Pain medicines somewhat helping. Last bowel movement was yesterday at " discharge. Would be amenable to receiving and NG tube for decompression. States he is now open to surgery, which was discussed at his previous admission. He is currently housed in an addiction treatment center and feels that now would be the best time for surgery due to available social support. He has not used meth in 6 months.     Past Medical History    Past Medical History:   Diagnosis Date    Acute appendicitis with localized peritonitis 1/31/2018    AIDS (H)     Allergic rhinitis due to other allergen     DNS    Anal dysplasia     Chronic abdominal pain     CNS toxoplasmosis (H)     COVID-19 1/11/2022    Diabetes type 2, controlled (H)     GERD (gastroesophageal reflux disease)     Herpes zoster 9/23/2016    History of seizure     History of substance abuse (H)     HIV (human immunodeficiency virus infection) (H)     HLD (hyperlipidemia)     Lung nodules     Periungual wart     Pneumonia 1/6/2019    PTSD (post-traumatic stress disorder)     Sleep apnea     doesn't use CPAP       Past Surgical History   Past Surgical History:   Procedure Laterality Date    ANOSCOPY N/A 9/9/2020    Procedure: Exam Under Anesthesia, ANOSCOPY, fulgeration of rectal fissures with Rectal Biopsies;  Surgeon: Thanh Lundberg MD;  Location: [facility] OR    COLONOSCOPY Left 1/22/2016    Procedure: COMBINED COLONOSCOPY, SINGLE OR MULTIPLE BIOPSY/POLYPECTOMY BY BIOPSY;  Surgeon: Clark Saini MD;  Location: [facility] GI    ESOPHAGOSCOPY, GASTROSCOPY, DUODENOSCOPY (EGD), COMBINED N/A 6/6/2022    Procedure: ESOPHAGOGASTRODUODENOSCOPY, WITH BIOPSY;  Surgeon: Kecia Benítez MD;  Location: [facility] GI    ESOPHAGOSCOPY, GASTROSCOPY, DUODENOSCOPY (EGD), COMBINED N/A 10/10/2024    Procedure: Esophagoscopy, gastroscopy, duodenoscopy (EGD), combined;  Surgeon: Nico Bhatti DO;  Location: [facility] GI    HC EXPLORE UNDESC TESTIS,INGUIN/SCROTAL      LAPAROSCOPIC APPENDECTOMY N/A 1/31/2018    Procedure: LAPAROSCOPIC APPENDECTOMY;  LAPAROSCOPIC APPENDECTOMY;   Surgeon: Dawn Holt MD;  Location: [facility] OR    LAPAROSCOPY DIAGNOSTIC (GENERAL) N/A 2016    Procedure: LAPAROSCOPY DIAGNOSTIC (GENERAL);  Surgeon: Susannah Arriaga MD;  Location: [facility] OR    LAPAROSCOPY DIAGNOSTIC (GENERAL) N/A 2018    Procedure: LAPAROSCOPY DIAGNOSTIC (GENERAL);  Diagnostic laparoscopy and lysis of adhesions;  Surgeon: Prince Dowling MD;  Location: [facility] OR    OPTICAL TRACKING SYSTEM CRANIOTOMY, EXCISE TUMOR, COMBINED Left 4/10/2015    Procedure: COMBINED OPTICAL TRACKING SYSTEM CRANIOTOMY, EXCISE TUMOR;  Surgeon: Mirlande Colmenares MD;  Location: [facility] OR    REPAIR GAMEKEEPER'S THUMB Right 2016    Procedure: REPAIR LIGAMENT ULNAR COLLATERAL THUMB (GAMEKEEPER'S);  Surgeon: Evin Zamorano MD;  Location:  OR    [facility] NONSPECIFIC PROCEDURE      right forearm fracture       Prior to Admission Medications   Prior to Admission Medications   Prescriptions Last Dose Informant Patient Reported? Taking?   Continuous Glucose Sensor (DEXCOM G6 SENSOR) MISC   No No   Si each every 10 days Change every 10 days.   Continuous Glucose Sensor (DEXCOM G7 SENSOR) MISC   No No   Si Device every 10 days.   Continuous Glucose Transmitter (DEXCOM G6 TRANSMITTER) MISC   No No   Si each every 3 months Change every 3 months.   Nutritional Supplements (ENSURE ACTIVE) LIQD   No No   Sig: Take 414 mLs by mouth daily   albuterol (PROAIR HFA/PROVENTIL HFA/VENTOLIN HFA) 108 (90 Base) MCG/ACT inhaler   No No   Sig: Inhale 2 puffs into the lungs every 6 hours as needed for shortness of breath, wheezing or cough.   albuterol (PROAIR HFA/PROVENTIL HFA/VENTOLIN HFA) 108 (90 Base) MCG/ACT inhaler   No No   Sig: Inhale 2 puffs into the lungs every 6 hours as needed for shortness of breath, wheezing or cough.   bictegravir-emtricitabine-tenofovir (BIKTARVY) -25 MG per tablet   No No   Sig: Take 1 tablet by mouth daily.   blood glucose (NO BRAND SPECIFIED) lancets standard   No No    Sig: Use to test blood sugar 1 time daily or as directed.   blood glucose (NO BRAND SPECIFIED) test strip   No No   Sig: Use to test blood sugar 1 time daily or as directed.   blood glucose monitoring (NO BRAND SPECIFIED) meter device kit   No No   Sig: Use to test blood sugar 3 times daily or as directed.   insulin aspart (NOVOLOG PEN) 100 UNIT/ML pen   No No   Sig: Inject 10 Units subcutaneously 3 times daily (with meals). Take only when you have access to a meal.   insulin glargine (LANTUS PEN) 100 UNIT/ML pen   No No   Sig: Inject 25 Units subcutaneously every morning. REDUCE TO 15 UNITS IF YOU THINK YOU WILL NOT HAVE FOOD THAT DAY.   insulin pen needle (32G X 4 MM) 32G X 4 MM miscellaneous   No No   Sig: Use 4-5 pen needles daily or as directed.   naproxen (NAPROSYN) 500 MG tablet   No No   Sig: Take 1 tablet (500 mg) by mouth 2 times daily as needed for moderate pain. Take with meals.   pantoprazole (PROTONIX) 40 MG EC tablet   No No   Sig: Take 1 tablet (40 mg) by mouth 2 times daily (before meals).   polymixin b-trimethoprim (POLYTRIM) 29643-3.1 UNIT/ML-% ophthalmic solution   No No   Sig: Place 2 drops Into the left eye every 2 hours (while awake).      Facility-Administered Medications: None        Review of Systems    The 5 point Review of Systems is negative other than noted in the HPI or here.     Social History   I have reviewed this patient's social history and updated it with pertinent information if needed.  Social History     Tobacco Use    Smoking status: Some Days     Current packs/day: 0.25     Average packs/day: 0.3 packs/day for 40.0 years (10.0 ttl pk-yrs)     Types: Cigarettes    Smokeless tobacco: Former   Substance Use Topics    Alcohol use: No     Alcohol/week: 0.0 standard drinks of alcohol     Comment: Last etoh in 2007    Drug use: Not Currently     Types: Marijuana     Comment: chart indicated meth use but pt denies; states he does not use drugs       Family History   I have  "reviewed this patient's family history and updated it with pertinent information if needed.  Family History   Problem Relation Age of Onset    Diabetes Brother     Diabetes Father     Alzheimer Disease Father     Unknown/Adopted Mother     Diabetes Paternal Grandfather     Cancer No family hx of         no skin cancer    Skin Cancer No family hx of         no famiy hx of skin cancer    Glaucoma No family hx of     Macular Degeneration No family hx of        Allergies   Allergies   Allergen Reactions    Fentanyl Blisters     Per pt, after taking this medication had blisters develope    Tylenol [Acetaminophen] Itching    Dulaglutide Rash    Insulin Lispro Rash     Patient reported    Penicillin V Other (See Comments) and Rash     Diffuse maculopapular rash + feels \"high\", per pt.         Physical Exam   Vital Signs: Temp: 98.2  F (36.8  C) Temp src: Oral BP: (!) 157/86 Pulse: 90   Resp: 16 SpO2: 98 %      Weight: 0 lbs 0 ozNo intake or output data in the 24 hours ending 12/20/24 1826     Gen: Alert and conversational adult male in NAD, oriented x3  Eyes: Nonicteric  ENT: Mucous Membranes Moist  Pulm: Nonlabored Breathing on RA  CV: RRR by palpation  Abd: Soft, mildly distended LLQ with tenderness to deep palpation and possible guarding, no rebound, no peritonitis  Skin: WWP  Extremities: MAEE  Neuro: CN grossly intact, no focal deficits  Psych: Appropriate, conversational         Data     I have personally reviewed the following data over the past 24 hrs:    7.5  \   13.4   / 435     133 (L) 98 20.4 /  115 (H)   4.4 23 0.82 \     ALT: 12 AST: 23 AP: 132 TBILI: 0.6   ALB: 4.1 TOT PROTEIN: 7.6 LIPASE: 57     Trop: 10 BNP: N/A     Procal: N/A CRP: <3.00 Lactic Acid: 1.1       INR:  0.97 PTT:  28   D-dimer:  N/A Fibrinogen:  N/A       Imaging results reviewed over the past 24 hrs:   Recent Results (from the past 24 hours)   Abdomen XR, 2 vw, flat and upright    Narrative    EXAMINATION: XR ABDOMEN 2 VIEWS  12/20/2024 1:56 " PM      CLINICAL HISTORY: luq pain hx of recurrent sbo eval and also for free  air etc    COMPARISON: 12/18/2024    FINDINGS:    Supine and upright frontal x-ray of the abdomen.Nonspecific bowel gas  pattern. Relative paucity of bowel gas. No definite pneumoperitoneum.  Mild to moderate colonic stool burden. Focal metallic opacity projects  over the right lower quadrant similar to prior radiograph from  12/18/2024, indeterminate, possibly ingested.      Impression    IMPRESSION:   1. Nonspecific bowel gas pattern. Relative paucity of bowel gas. No  definite pneumoperitoneum. Mild to moderate colonic stool burden.  2. Focal metallic opacity projects over the right lower quadrant  similar to prior radiograph from 12/18/2024, indeterminate, possibly  ingested/postprocedure. Consider correlation.    I have personally reviewed the examination and initial interpretation  and I agree with the findings.    LAURYN FOURNIER MD         SYSTEM ID:  [id number]   CT Abdomen Pelvis w/o Contrast    Narrative    EXAMINATION: CT ABDOMEN PELVIS W/O CONTRAST, 12/20/2024 5:10 PM    INDICATION: eval for recurrent sbo    COMPARISON STUDY: 10/9/2024    TECHNIQUE: CT scan of the abdomen and pelvis was performed on  multidetector CT scanner using volumetric acquisition technique and  images were reconstructed in multiple planes with variable thickness  and reviewed on dedicated workstations.     CONTRAST: None   CT scan radiation dose is optimized to minimum requisite dose using  automated dose modulation techniques.    FINDINGS:    Lower thorax: Normal.    Liver: No mass. No intrahepatic biliary ductal dilation.    Biliary System: Cholelithiasis without cholecystitis.    Pancreas: No mass or pancreatic ductal dilation.    Adrenal glands: No mass or nodules    Spleen: Normal.    Kidneys: No suspicious mass, obstructing calculus or hydronephrosis.    Gastrointestinal tract : Moderate thickening and distal esophagus.  Distention of the proximal  stomach. Single dilated loop of small bowel  measuring up to 3.9 cm, for example series 2 image 45. This is  slightly increased in caliber compared to prior however decrease  portion of small bowel involved. Decompressed distal small bowel.  There is stool within the colon. No pneumatosis, mesenteric venous gas  or pneumoperitoneum. Normal appendix.    Mesentery/peritoneum/retroperitoneum: No mass. No free fluid or air.    Lymph nodes: No significant lymphadenopathy.    Vasculature: Patent major abdominal vasculature.    Pelvis: Urinary bladder is normal.  Penile pump in appropriate  position.  Prostatomegaly measuring 5.6 cm in transverse dimension.    Osseous structures: No aggressive or acute osseous lesion.      Soft tissues: Within normal limits.  Unchanged nodular density in the  anterior right lower quadrant subcutaneous soft tissue.      Impression    IMPRESSION:     Dilated loop of small bowel measuring up to 4.7 cm in the central  abdomen compatible with bowel obstruction; with 2 apparent transition  points, developing closed-loop obstruction not entirely excluded. No  pneumatosis, mesenteric venous gas or portal venous gas.

## 2024-12-21 NOTE — DISCHARGE INSTRUCTIONS
Resources for People who are Unhoused [place]*    FREE MEALS    BREAKFAST:     [address] [phone]   Serves breakfast Monday - Saturday 7:00-8:00 AM    [place] [phone]   Serves women and children Monday - Friday 8:30-9:00 AM (men with children welcome)     [facility] (Kate leonard)  [address] [phone]   Serves breakfast Monday - Thursday at 10:00 AM and on Saturday and Sunday at 9:30 AM.     LUNCH & SNACKS:     [address] [phone]  Lunch: Monday - Saturday 11:30 - 12:30 PM  Drop In Hours: Monday - Saturday 7:00 AM to   3:00 PM. Montserratian speaking staff. Showers, laundry and lockers available. Lockers are $1 for 30 days. After 30 days you must wait 3 months to rent a locker again.     [address] [phone]   Lunch is served Monday - Friday 12:00-1:00 PM and Saturday and Sunday 10:30-11:30 AM.     [facility]  [address] [facility] [phone]   Serves women lunch on Thursdays at 11:45 AM    [facility]   510 [facility] [phone]   Provides a gathering place where poor, homeless and economically comfortable people come to form community, work for non-violence, affirmation and responsibility. Emphasis is on spirituality through daily reflection time together, followed by lunch. Lunch is served Monday - Friday at 11:45 AM.     . [facility] witness [place]   519 [facility] [phone]   Bag lunches and snacks available. Warm space is open for young people on Mondays 4:30-8:00 PM. Warm space is open to all on Thursdays 12-4:00 PM.     [facility] (Kate leonard)  [address] [place] [address] [phone]   Serves lunch Monday - Thursday at 12:00 PM and on Saturday and Sunday at 11:00 AM.      [facility]  4589 [facility] / [phone]   Lunch is served Monday - Friday 11:00-12:30 PM     [facility]  101 [facility] / [phone]   Tickets are issued on Sunday at 10:30 AM and doors open at 11:30 AM. Meal is served at 12:00 PM.    [facility]  1010 [facility] / [phone]   A nice lunch is served on Sundays after chapel, which begins at 10:00 AM. You must attend chap to go to the meal.     [facility]   2608 [facility] / [phone]   Dinner is served on Sundays, October - April from 2:30-4:30 PM. Doors open at 1:30 PM.     DINNER:     [facility]   5200 [facility] / [phone]   Dinner is served Wednesdays 5:45-6:30 PM    [facility]  1010 [facility] / [phone]   Dinner is served 7 days a week at 6:00 PM (usually soup and sandwiches).     [facility]  1112 [facility] / [phone]   Tuesday, Friday & Saturday dinner is served after a 7:00 PM service. On the second Tuesday of the month there is a dinner at 6:00 PM.     Javi and Narayan Gonsalez  ** All sites serve Monday-Friday 5:30-6:30 PM except as noted.   [facility] [facility]: [phone]  2202 [facility]: [phone] [address] [place] [address]   ** Serves Monday - Thursday 5:15-6:15 PM    [facility]uel Du[facility]: [phone] 2123 [facility]: [phone]   9801 [facility]: [phone] 7132 [facility]   ** Serves Tuesday and Thursday 5:00-6:00 PM     [facility] (Kate leonard)  [address]   [phone]   Dinner is served Monday - Thursday at 4:00 PM.     [facility]   519 [facility]  [phone]  Dinner  is served on Monday at 6:00 PM for people under the age of 30.     These Sunday meals are served 5:00-6:00 PM:  ** First and Fifth Sundays of the Month  [facility]   ** Second Sunday of the Month  [facility] [place]   519 [facility]   ** Third Sunday of the Month  [facility]  [address]   ** Fourth Sunday of the Month   [facility] [address] at [address]     MEN & WOMEN SHELTERS    [facility]   [address]  [phone] ()  Intake: You must be on GA or SSI or you can be referred by a [facility] Access Worker, financial worker,  or other sort of counselor.   Remarks: The [facility] is a structured housing program for people with special needs who are vulnerable. There is no set limit for how long you can stay (depends on what your situation is).     [place] [phone]   Emergency Housing: Provides shelter, personal hygiene items and nutrition for homeless single adults who are receiving GA, SSI or other government benefits.   Jean Program: Provides shelter, personal hygiene and nutrition for able-bodied adults who are employed or actively seeking employment. There is an $8 per day co-pay to stay there.     WOMEN ONLY SHELTERS    [place] [phone]   [facility]: A secure shelter for single homeless women. Beds are available first come first serve each night.     [facility]   [address]  [phone]   The focus of this shelter is on families with children,   single-vulnerable females and unaccompanied youth. Single women share a room with one or two other women. Three meals per day are served. Private rooms with a bathroom and shower and  free laundry facilities are available. Assistance from [employer] original products, medical clinic, Economic Assistance, [facility] and Rapid Exit are also available, as well as services from 20 other agencies.     [facility]  2219 [facility]   [phone]   Hours: 6:00 PM to 7:00 AM   Provides 6 beds for women and you can stay for 28 days. Some extensions are possible. Breakfast, dinner, showers, laundry, and an address to receive mail are available. Building is wheelchair accessible.   Lottery Procedure: Drawings for available beds will be held at [facility] [address] [place] [address] on Monday nights at 6:30pm. You can arrive after 6:00 PM but must be signed up for the lottery before 6:30 PM. You must be sober to participate in the lottery, meaning no alcohol or drug use prior to the drawing. There will be no one-night beds given out through individual lotteries. On Mondays a waiting list will be chosen. Women on the waiting list should contact the shelter each day to see if beds are available.     [facility]   [address] [place] [address] (SHIRA)[facility]   [phone] (daytime); [phone] (after 5pm)  Hours: 6:00 PM - 7:00 AM  First-come, first-serve beds for 20 women. Dinner and simple breakfast served. Showers, laundry and advocacy are available.   ** Women should call nightly after 5:00 PM to reserve a bed. Women who agree to save 40% of their income to be used towards housing will receive an extended stay.     MEN ONLY SHELTERS    [facility]  1000 [facility]   [phone]   Single men without children who are not eligible for [facility] may stay on a mat in a secure area. Open 7 days a week from 5:00 PM to 7:00 AM. A light dinner is served. Restrooms and showers are available. Beds upstairs are available for $4 per night.     [facility]  1010 [facility]  [place]  [phone]   [facility]: A secure shelter for single homeless men. Beds are available first come, first serve each night.   Spiritual Growth Program: Provides shelter, personal hygiene items and nutrition for men who are seeking a rejuvenation of mind, body and spirit.   [place] [facility]: Residential substance abuse treatment program. Phase 1 is a 21 day program. Phase 2 is a 75 day intermediate house program.     [facility] [place] 620151 [facility]  [phone]   Hours: 6:00 PM to 7:00 AM  Provides 43 beds that are available for stays up to 28 days. People who work late and have a 28 day spot can come to the shelter late. You can get an extended stay by saving 40% of your income towards housing. If you have never been to the lottery before, you can talk to shelter staff to be put on the waiting list. Breakfast, showers, weekend meals and laundry are available. Regular medical services are offered. [facility] Access workers make regular visits and staff members speak Romanian. Job preparation and search program is available. You must be completely sober to stay at [facility]. See lottery procedure.    [facility]   4088 [facility]  [phone]   Hours: 6:00 PM to 7:00 AM  Provides 34 beds that are available for 28 days (some extensions are possible). Breakfast, dinner, showers, laundry, and an address to receive mail are available. Building is wheelchair accessible. Job preparation and search program is available. See lottery procedure.    [facility]   [address] [place] [address]   [phone] (day); [phone] (3-5pm, after 6pm)   Hours: 6:00 PM to 7:00 AM. Office hours are   Monday - Friday 3:00-5:00 PM.  Provides 46 beds that are available for stays up to 28 days. Dinner and simple breakfast is served. Showers, laundry, storage, savings, health clinic and advocacy are available. There is legal clinic that is  held on Mondays. Extended stays may be available for those who are willing to save 40% of their income for permanent housing and have pay stubs to verify employment. Job preparation and search program is available. See lottery procedure.     Lottery Procedure for [facility], [facility] Shelter and [facility]:  Drawings for available beds are held at the [facility], [facility]0 [address] [place] [address] on Monday nights at 6:30 PM. You must be signed up for the lottery before 6:30 PM. You must be sober to participate in the lottery, meaning no alcohol or drug use prior to the drawing. There will be no 1 night beds given out through individual lotteries. On Mondays a waiting list will be chosen for each shelter. People on the waiting list may receive 1 night or 28 nights depending on which shelter s list you are on. People on the waiting list should contact their respective shelter each day to see if beds area available.     FAMILIES WITH CHILDREN SHELTERS    [facility]   [address]  [phone]   The focus of this shelter is on families with children,   single-vulnerable females and unaccompanied youth. Single women share a room with one or two other women. Three meals per day are served. Private rooms with a bathroom and shower and free laundry facilities are available. Assistance from [employer], medical clinic, Economic Assistance, [place] [facility] and Rapid Exit are also available, as well as services from 20 other agencies.     [facility]  [address] [facility]  [phone]   Intake Hours: Monday - Friday 8:00-5:00 PM.  Space is available for 8 families with children. Stays are up to 45 days. Laundry, shower and meals are available. An intake interview is required. Call ahead to schedule an intake.     [facility]  [address]  [phone]   Intake Hours: Monday - Thursday 10:00-11:30 AM  and 1:30-3:30 PM. Your entire family must go to [facility] ([address]) to apply for shelter at [facility].   ** Provides shelter for up to 30 days for families with two or more children.     [facility]  2634 [facility]  [phone]   Provides up to 16 rooms for homeless families (women and children only) who have no boys over the age of 18. You must be referred through [facility] Emergency Assistance.     WOMEN WHO HAVE EXPERIENCED INTIMATE PARTNER VIOLENCE SHELTERS    [facility]   [address] [place] [address]  [phone]  Office Hours: Monday - Friday 8:00-4:30 PM   24 Hour Crisis Line: [phone]  24 Hour Battered Women s Hotline: [phone]     YOUTH SHELTERS    [facility]venues   1702 [facility]  [phone]   Call for intake information, 24 hours a day, seven days a week. Provides overnight shelter for youth ages   15-20. Clothing, case management, referrals to services and legal assistance available.     [facility]  2200 [facility]  [phone]   Open 24 hours a day. Space for 17 youth. Call to see if space is available. Services include street outreach, youth support groups and individual and family counseling.     [facility]  [phone]   Staff will provide assessments of needs and network with other agencies to provide shelter for youth. Call between 10:00 AM and 8:30 PM, Monday - Saturday or stop in. Drop-In Center is open 3:00-8:00 PM every day except for Wednesdays and Sundays.     SHOWERS     [facility]  [address]   [phone]  Showers and laundry are available Monday-Friday 7:00-11:00 AM and 1:00-2:30 PM.     [facility]  [address]  [phone]  Showers are available Monday-Friday 9:00-1:00 PM.    [facility]  1010 [facility]      Showers are available 7 days a week, 8:00-4:30 PM.  "    [facility] (Kate leonard)  [address]   [phone]  Showers are available:  Monday-Thursday 10:30-11:20 AM AND 1:30-3:00 PM, Saturday 9:30-10:30 AM, Sunday 10:30-11:00 AM     *All information subject to change without notice. This is not a complete list of community resources.      Chemical Health Resources*    -[facility] Medicine [phone]  -[facility] [phone]  -SMART Recovery - self management for addiction recovery: www.smartrecovery.org  -Pathways ~ A Health Crisis Resource & Support Center: [phone].  -Substance Abuse and Mental Health Services (www.samhsa.gov)  -Harm Reduction Coalition (www. Harmreduction.org)  -Poison control [phone]    Best Sobriety Apps of 2023 - as recommended on the [employer] website  Here s a list of the seven best sobriety apps of 2023. Each one is classified by its stand-out feature to help you choose the right bebo for you. Search apps in your smartphone bebo store.     I am Sober  WeConnect  Sober Grid  Sober Time  SoberTool  Bicycle Health  Loosid    Internal Coping Strategies - Things I can do to take my mind off my problems without contacting another person:  1. Care for yourself: Put on lotion that smells good, spend time in nature, take a bath, drink tea, or take care of your body in a way that makes you feel good such as manicure, doing your hair, putting on a face mask.  2. Engage in a hobby: Do something you enjoy such as coloring, drawing, dance, listening to music.  3. Exercise: Do yoga, go for a walk, take a hike, or engage in a recreational sport.  4. Focus on a task: Clean the house (or a closet, drawer, or area), cook a meal, garden, or read a book.  5. Practice mindfulness: List the things you feel grateful for, meditate, picture your \"happy place,\" or look at pictures to remind you of the people, places, and things that bring yeni.  6. Use relaxation strategies: Play with a " pet, practice breathing exercises, squeeze a stress ball, use a relaxation bebo, enjoy some aromatherapy, try progressive muscle relaxation, or write in a journal.    Problem focused coping - things I can do to take control of stress:  1. Ask for support from a friend or a professional.  2. Create a to-do list to break the problem down into manageable pieces.  3. Engage in problem-solving such as identifying aspects of the problem that you can change, weighing pros and cons, think of ways you solved a problem in the past.  4. Establish healthy boundaries, communicate your needs and set limits.  5. Walk away and leave a situation that is causing you stress, take a break for some self care.  6. Set small reasonable goals and schedule them into your day.     Helpful Apps for improving your coping skills  Mindfulness   Yoselin Fabian Timer  Headspace  Virtual Hope Box  My3    Looking for support? Need to talk?  If you re struggling with your mental health but aren t experiencing a mental health crisis or emergency, reach out to the [place] Warmline.  Open Seven Days a Week, 9 AM to 9 PM  [phone]     FREE, ACCESSIBLE MENTAL HEALTH AND CRISIS COUNSELING   Some in-person services now available (MWF 1-3 pm)  [facility] is a non-profit organization that has provided counseling services to people with urgent needs and few options since 1969. Counseling is always FREE and ANONYMOUS, with NO APPOINTMENT needed during clinic hours. All services are provided by professional clinicians who are volunteers. ANYONE can use our services. Asking for help can be the healthiest thing you do!    CLINIC HOURS: (Hours will not change.)  Monday, Wednesday, Friday afternoons: 1 - 3 p.m. in-person and virtual (phone or internet).  Monday through Thursday evenings: 5:30 - 7:30 p.m. virtual only (phone or internet).  Please come, call or login to Zoom only during clinic hours. You will talk with the clinic     who will connect you with a counselor ASAP. Please be patient.    IN PERSON: Come to [facility], on Monday, Wednesday or Friday from 1-3 p.m.     BY PHONE: During clinic hours, find a quiet place. Please don t phone while driving!  Call the [employer] phone number for the [place] - [phone]. When prompted by Zoom, enter   the Meeting ID for the Walk-In clinic: [phone].     BY COMPUTER INTERNET: During clinic hours, go to: [email or web address]   Follow the on-screen instructions to open Zoom.  Click on the blue button to  Launch Meeting.   Next you will see  Please wait, the meeting host will let you in soon,  so please wait.   Turn your computer camera on.  Having trouble? Go to our website, [email or web address].    SERVICIOS EN ESPAÑOL / SERVICES TO Slovenian SPEAKERS  Seguimos atendiendo a los hispanohablantes: El cliente debe llamar a nuestro número principal ([phone]), x 2 para español. Volveremos a llamar al cliente para programar la consejería.  We continue to serve Cymraes speakers: The client should call our main number ([phone]), x 2 for  Cymraes. We will call the client back to arrange for counseling.     Sober Support Group Information: AA/NA & Sponsor/Support  -Alcoholics Anonymous (www.alcoholics-anonymous.org): for local information 24 hours/day  -AA Intergroup service office in [place] (http://www.aastpaul.org/) [phone]  -AA Intergroup service office in [facility]: [phone]. (http://www.a[place].org/)  -Narcotics Anonymous (www.nam[place].org) (632) 837-4096  -Sober Fun Activities: www.sober-activities.[employer]/[facility]//[employer] Recovery Connection ([facility])  [facility] connects people seeking recovery to resources that help foster and sustain long-term recovery. Whether you are seeking resources for treatment, transportation, housing, job training, education, health care or other pathways to recovery, [facility] is a great place to  "start.  Phone: 158.755.1054. www.[facility]onna HospitalAnesco.[employer] (Great listing of all types of recovery and non-recovery related resources)\"    *This is not a complete list of community resources. All information subject to change without notice.    " "on-screen instructions to open Zoom.  Click on the blue button to  Launch Meeting.   Next you will see  Please wait, the meeting host will let you in soon,  so please wait.   Turn your computer camera on.  Having trouble? Go to our website, [email or web address].    SERVICIOS EN ESPAÑOL / SERVICES TO Luxembourgish SPEAKERS  Seguimos atendiendo a los hispanohablantes: El cliente debe llamar a nuestro número principal ([phone]), x 2 para español. Volveremos a llamar al cliente para programar la consejería.  We continue to serve Malagasy speakers: The client should call our main number ([phone]), x 2 for  Malagasy. We will call the client back to arrange for counseling.     Sober Support Group Information: AA/NA & Sponsor/Support  -Alcoholics Anonymous (www.alcoholics-anonymous.org): for local information 24 hours/day  -AA Intergroup service office in [place] (http://www.aastpaul.org/) [phone]  -AA Intergroup service office in [facility]: [phone]. (http://www.aamin[place].org/)  -Narcotics Anonymous (www.nam[place].org) (852) 190-3906  -Sober Fun Activities: www.sober-activities.[employer]/[facility]//[facility] Connection ([facility])  [facility] connects people seeking recovery to resources that help foster and sustain long-term recovery. Whether you are seeking resources for treatment, transportation, housing, job training, education, health care or other pathways to recovery, [facility] is a great place to start.  Phone: [phone]. www.[place]RELEASEIF.TheDigitel (Great listing of all types of recovery and non-recovery related resources)\"    *This is not a complete list of community resources. All information subject to change without notice.    "

## 2024-12-21 NOTE — OP NOTE
[facility]     Brief Operative Note     Pre-operative diagnosis:         Bowel obstruction (H) [K56.609]  Post-operative diagnosis        Same as pre-operative diagnosis     Procedure:      Laparotomy exploratory with smal bowel resection., N/A - Abdomen     Surgeon:         Surgeons and Role:     * Christiano Obrien MD - Primary     * Kathy Sierra MD - Resident - Assisting     * Bere Howe MD - Resident - Assisting  Anesthesia:     General             Estimated Blood Loss: 200 ml     Drains: None  Specimens:       ID Type Source Tests Collected by Time Destination   1 : Small bowel Stricture. Tissue Small Intestine SURGICAL PATHOLOGY EXAM Christiano Obrien MD 12/20/2024 10:39 PM        Findings:                     Small bowel stricture causing obstruction, resected ~7cm of small bowel with side to side end to end functional anastomosis .  Complications:            None.  Implants:         * No implants in log *             INDICATIONS FOR PROCEDURE  Andrew Collier is a 59 year old male who has previously undergone several laparoscopic surgeries.  He developed a small bowel obstruction that temporarily resolved however he returned soon after a recent hospital discharge with recurrent symptoms and imaging findings of a closed loop obstruction. Thus he was advised to undergo an operation to better identify the source of the this closed loop obstruction.     After understanding the risks and benefits of proceeding with an exploratory laparotomy, he agreed to an operation.    General risks related to abdominal surgery were reviewed with the patient.    OPERATIVE PROCEDURE:  Under the benefits of general endotracheal anesthesia, the peritoneal cavity was entered through a vertical midline incision. There were no significant adhesions intra-abdominally though there was scar tissue around the umbilicus. A large wound protector  was placed.    The bowel was then run proximally and then distally starting from the ligament of Trietz showing decompressed proximal bowel however mid ileum there was a dilated segment that was likely the source of the imaging findings. Distal to the dilated segment there was a small bowel stricture causing the obstruction followed by more decompressed bowel distally.     It was determined that a small bowel resection was necessary.    The proximal transection was chosen at a point of viable bowel and the distal transection line was also chosen at a point of viable bowel away fro the stricture.     A blue load 100 endoGIA stapler was used to transect the bowel proximally and distally. This was about a 7cm segment of small bowel.    The mesentery of the small bowel was divided using a combination of the Ligasure and 3-0 vicryl suture for any bleeding vessels not captured properly by the Ligasure.    The small bowel was then lined up in a side to side configuration.  The bowel was tacked together at the anti-mesenteric aspect.  Enterotomies were made on each piece of bowel and a 100 blue load endo-TIMOTHY stapler device was placed and fired to create the enteroenterostomy.  The common enterotomy defect was closed a 90 TA stapler. The mesentery was closed with one figure of 8 3-0 vicryl suture.     The abdomen was inspected for hemostasis.  The fascia was closed using 0-looped PDS and the skin was closed with staples    Sterile dressings were applied.  The patient tolerated the procedure well.      All sponge and needle counts were correct x 2 at the end of the procedure.    Dr. Obrien was scrubbed for all critical components of the operation/was available throughout the operation.    Kathy Sierra MD  PGY-7 General Surgery

## 2024-12-21 NOTE — ANESTHESIA POSTPROCEDURE EVALUATION
Patient: Andrew Collier    Procedure: Procedure(s):  Laparotomy exploratory with smal bowel resection.       Anesthesia Type:  General    Note:  Disposition: Inpatient   Postop Pain Control: Uneventful            Sign Out: Well controlled pain   PONV: No   Neuro/Psych: Uneventful            Sign Out: Acceptable/Baseline neuro status   Airway/Respiratory: Uneventful            Sign Out: Acceptable/Baseline resp. status   CV/Hemodynamics: Uneventful            Sign Out: Acceptable CV status; No obvious hypovolemia; No obvious fluid overload   Other NRE: NONE   DID A NON-ROUTINE EVENT OCCUR?            Last vitals:  Vitals Value Taken Time   /92 12/21/24 0100   Temp 36.7  C (98  F) 12/21/24 0100   Pulse 102 12/21/24 0112   Resp 17 12/21/24 0112   SpO2 95 % 12/21/24 0112   Vitals shown include unfiled device data.    Electronically Signed By: Carlyn Lozano MD  December 21, 2024  1:13 AM

## 2024-12-22 LAB
ANION GAP SERPL CALCULATED.3IONS-SCNC: 14 MMOL/L (ref 7–15)
BUN SERPL-MCNC: 17.7 MG/DL (ref 8–23)
CALCIUM SERPL-MCNC: 8.8 MG/DL (ref 8.8–10.4)
CHLORIDE SERPL-SCNC: 105 MMOL/L (ref 98–107)
CREAT SERPL-MCNC: 0.7 MG/DL (ref 0.67–1.17)
EGFRCR SERPLBLD CKD-EPI 2021: >90 ML/MIN/1.73M2
ERYTHROCYTE [DISTWIDTH] IN BLOOD BY AUTOMATED COUNT: 13.9 % (ref 10–15)
GLUCOSE BLDC GLUCOMTR-MCNC: 194 MG/DL (ref 70–99)
GLUCOSE BLDC GLUCOMTR-MCNC: 197 MG/DL (ref 70–99)
GLUCOSE BLDC GLUCOMTR-MCNC: 214 MG/DL (ref 70–99)
GLUCOSE BLDC GLUCOMTR-MCNC: 228 MG/DL (ref 70–99)
GLUCOSE BLDC GLUCOMTR-MCNC: 231 MG/DL (ref 70–99)
GLUCOSE BLDC GLUCOMTR-MCNC: 232 MG/DL (ref 70–99)
GLUCOSE BLDC GLUCOMTR-MCNC: 240 MG/DL (ref 70–99)
GLUCOSE BLDC GLUCOMTR-MCNC: 248 MG/DL (ref 70–99)
GLUCOSE SERPL-MCNC: 237 MG/DL (ref 70–99)
HCO3 SERPL-SCNC: 17 MMOL/L (ref 22–29)
HCT VFR BLD AUTO: 40.8 % (ref 40–53)
HGB BLD-MCNC: 13.5 G/DL (ref 13.3–17.7)
MCH RBC QN AUTO: 29.2 PG (ref 26.5–33)
MCHC RBC AUTO-ENTMCNC: 33.1 G/DL (ref 31.5–36.5)
MCV RBC AUTO: 88 FL (ref 78–100)
PLATELET # BLD AUTO: 260 10E3/UL (ref 150–450)
POTASSIUM SERPL-SCNC: 4.1 MMOL/L (ref 3.4–5.3)
RADIOLOGIST FLAGS: NORMAL
RBC # BLD AUTO: 4.62 10E6/UL (ref 4.4–5.9)
SODIUM SERPL-SCNC: 136 MMOL/L (ref 135–145)
WBC # BLD AUTO: 7.9 10E3/UL (ref 4–11)

## 2024-12-22 PROCEDURE — 250N000013 HC RX MED GY IP 250 OP 250 PS 637

## 2024-12-22 PROCEDURE — 80048 BASIC METABOLIC PNL TOTAL CA: CPT | Performed by: STUDENT IN AN ORGANIZED HEALTH CARE EDUCATION/TRAINING PROGRAM

## 2024-12-22 PROCEDURE — 250N000013 HC RX MED GY IP 250 OP 250 PS 637: Performed by: STUDENT IN AN ORGANIZED HEALTH CARE EDUCATION/TRAINING PROGRAM

## 2024-12-22 PROCEDURE — 250N000011 HC RX IP 250 OP 636

## 2024-12-22 PROCEDURE — 250N000009 HC RX 250: Performed by: STUDENT IN AN ORGANIZED HEALTH CARE EDUCATION/TRAINING PROGRAM

## 2024-12-22 PROCEDURE — 250N000009 HC RX 250

## 2024-12-22 PROCEDURE — 258N000003 HC RX IP 258 OP 636: Performed by: STUDENT IN AN ORGANIZED HEALTH CARE EDUCATION/TRAINING PROGRAM

## 2024-12-22 PROCEDURE — 120N000002 HC R&B MED SURG/OB UMMC

## 2024-12-22 PROCEDURE — 999N000111 HC STATISTIC OT IP EVAL DEFER: Performed by: OCCUPATIONAL THERAPIST

## 2024-12-22 PROCEDURE — 36415 COLL VENOUS BLD VENIPUNCTURE: CPT | Performed by: STUDENT IN AN ORGANIZED HEALTH CARE EDUCATION/TRAINING PROGRAM

## 2024-12-22 PROCEDURE — 85041 AUTOMATED RBC COUNT: CPT | Performed by: STUDENT IN AN ORGANIZED HEALTH CARE EDUCATION/TRAINING PROGRAM

## 2024-12-22 PROCEDURE — 99233 SBSQ HOSP IP/OBS HIGH 50: CPT | Performed by: INTERNAL MEDICINE

## 2024-12-22 PROCEDURE — 85014 HEMATOCRIT: CPT | Performed by: STUDENT IN AN ORGANIZED HEALTH CARE EDUCATION/TRAINING PROGRAM

## 2024-12-22 RX ORDER — BISACODYL 10 MG
10 SUPPOSITORY, RECTAL RECTAL ONCE
Status: COMPLETED | OUTPATIENT
Start: 2024-12-22 | End: 2024-12-22

## 2024-12-22 RX ORDER — OXYCODONE HYDROCHLORIDE 10 MG/1
10 TABLET ORAL EVERY 8 HOURS PRN
Status: DISCONTINUED | OUTPATIENT
Start: 2024-12-22 | End: 2024-12-22

## 2024-12-22 RX ORDER — METOPROLOL TARTRATE 1 MG/ML
5 INJECTION, SOLUTION INTRAVENOUS EVERY 6 HOURS
Status: DISCONTINUED | OUTPATIENT
Start: 2024-12-22 | End: 2024-12-23

## 2024-12-22 RX ORDER — METHOCARBAMOL 500 MG/1
500 TABLET, FILM COATED ORAL 3 TIMES DAILY
Status: DISCONTINUED | OUTPATIENT
Start: 2024-12-22 | End: 2024-12-23

## 2024-12-22 RX ORDER — SIMETHICONE 80 MG
80 TABLET,CHEWABLE ORAL EVERY 6 HOURS PRN
Status: DISCONTINUED | OUTPATIENT
Start: 2024-12-22 | End: 2024-12-25

## 2024-12-22 RX ORDER — KETOROLAC TROMETHAMINE 30 MG/ML
30 INJECTION, SOLUTION INTRAMUSCULAR; INTRAVENOUS ONCE
Status: DISCONTINUED | OUTPATIENT
Start: 2024-12-22 | End: 2024-12-22

## 2024-12-22 RX ORDER — OXYCODONE HYDROCHLORIDE 5 MG/1
5-10 TABLET ORAL EVERY 8 HOURS PRN
Status: DISCONTINUED | OUTPATIENT
Start: 2024-12-22 | End: 2024-12-23

## 2024-12-22 RX ORDER — KETOROLAC TROMETHAMINE 30 MG/ML
15 INJECTION, SOLUTION INTRAMUSCULAR; INTRAVENOUS ONCE
Status: COMPLETED | OUTPATIENT
Start: 2024-12-22 | End: 2024-12-22

## 2024-12-22 RX ADMIN — SIMETHICONE 80 MG: 80 TABLET, CHEWABLE ORAL at 12:57

## 2024-12-22 RX ADMIN — BISACODYL 10 MG: 10 SUPPOSITORY RECTAL at 08:57

## 2024-12-22 RX ADMIN — SODIUM CHLORIDE: 9 INJECTION, SOLUTION INTRAVENOUS at 04:37

## 2024-12-22 RX ADMIN — METOPROLOL TARTRATE 5 MG: 5 INJECTION INTRAVENOUS at 23:43

## 2024-12-22 RX ADMIN — METOPROLOL TARTRATE 2.5 MG: 5 INJECTION INTRAVENOUS at 04:39

## 2024-12-22 RX ADMIN — OXYCODONE HYDROCHLORIDE 5 MG: 5 TABLET ORAL at 11:26

## 2024-12-22 RX ADMIN — FAMOTIDINE 20 MG: 20 TABLET, FILM COATED ORAL at 19:27

## 2024-12-22 RX ADMIN — LIDOCAINE 4% 1 PATCH: 40 PATCH TOPICAL at 04:39

## 2024-12-22 RX ADMIN — ENOXAPARIN SODIUM 40 MG: 40 INJECTION SUBCUTANEOUS at 19:26

## 2024-12-22 RX ADMIN — METHOCARBAMOL TABLETS 500 MG: 500 TABLET, COATED ORAL at 13:00

## 2024-12-22 RX ADMIN — BICTEGRAVIR SODIUM, EMTRICITABINE, AND TENOFOVIR ALAFENAMIDE FUMARATE 1 TABLET: 50; 200; 25 TABLET ORAL at 17:06

## 2024-12-22 RX ADMIN — METHOCARBAMOL TABLETS 500 MG: 500 TABLET, COATED ORAL at 19:27

## 2024-12-22 RX ADMIN — OXYCODONE HYDROCHLORIDE 5 MG: 5 TABLET ORAL at 19:44

## 2024-12-22 RX ADMIN — FAMOTIDINE 20 MG: 20 TABLET, FILM COATED ORAL at 08:57

## 2024-12-22 RX ADMIN — INSULIN GLARGINE 5 UNITS: 100 INJECTION, SOLUTION SUBCUTANEOUS at 08:57

## 2024-12-22 RX ADMIN — METOPROLOL TARTRATE 5 MG: 5 INJECTION INTRAVENOUS at 17:05

## 2024-12-22 RX ADMIN — SODIUM CHLORIDE: 9 INJECTION, SOLUTION INTRAVENOUS at 13:03

## 2024-12-22 RX ADMIN — METOPROLOL TARTRATE 5 MG: 5 INJECTION INTRAVENOUS at 10:54

## 2024-12-22 RX ADMIN — SODIUM CHLORIDE: 9 INJECTION, SOLUTION INTRAVENOUS at 21:04

## 2024-12-22 RX ADMIN — KETOROLAC TROMETHAMINE 15 MG: 30 INJECTION, SOLUTION INTRAMUSCULAR at 08:57

## 2024-12-22 RX ADMIN — SIMETHICONE 80 MG: 80 TABLET, CHEWABLE ORAL at 23:42

## 2024-12-22 ASSESSMENT — ACTIVITIES OF DAILY LIVING (ADL)
ADLS_ACUITY_SCORE: 33

## 2024-12-22 NOTE — PROGRESS NOTES
Surgery Progress Note  12/22/2024       Subjective:  -Patient still having a lot of pain.  Feels bloated, no bowel function yet.  We discussed retrying oxycodone, Toradol which patient is in agreement with.  Also discussed suppository to assist with bowel function.  Continues to be tachycardic     Objective:  Temp:  [98.9  F (37.2  C)-100.5  F (38.1  C)] 99.8  F (37.7  C)  Pulse:  [110-125] 116  Resp:  [16-18] 16  BP: (132-147)/(81-98) 132/92  SpO2:  [93 %-97 %] 93 %    I/O last 3 completed shifts:  In: 24 [I.V.:24]  Out: 1475 [Urine:1475]      Gen: Awake, alert, NAD  CV: Sinus tachycardia  Resp: NLB on RA  Abd: soft, mildly distended, tender on palpation/  Incision: c/d/I  Ext: WWP, no edema     Labs:  Recent Labs   Lab 12/22/24  0624 12/21/24  0921 12/20/24  1506   WBC 7.9 5.7 7.5   HGB 13.5 12.8* 13.4    311 435       Recent Labs   Lab 12/22/24  0831 12/22/24  0624 12/22/24  0433 12/21/24  1046 12/21/24  0921 12/20/24  2053 12/20/24  1506   NA  --  136  --   --  135  --  133*   POTASSIUM  --  4.1  --   --  3.6  --  4.4   CHLORIDE  --  105  --   --  103  --  98   CO2  --  17*  --   --  20*  --  23   BUN  --  17.7  --   --  20.3  --  20.4   CR  --  0.70  --   --  0.84  --  0.82   * 237* 232*   < > 297*   < > 392*   LINDA  --  8.8  --   --  8.2*  --  10.0    < > = values in this interval not displayed.       Imaging:  CT abdomen pelvis 12/20/2024  IMPRESSION:      Dilated loop of small bowel measuring up to 4.7 cm in the central  abdomen compatible with bowel obstruction; with 2 apparent transition  points, developing closed-loop obstruction not entirely excluded. No  pneumatosis, mesenteric venous gas or portal venous gas.     Assessment/Plan:   59 year old male with history significant for recurrent SBO, poorly controlled T2DM, HIV on ART, and recent admission for SBO managed conservatively with decompression and gastrograffin challenge presenting to the ED with recurrent abdominal pain. Review of CT  imaging with isolated segment of dilated bowel concerning for closed loop obstruction. In setting of multiple previous admissions for obstruction, exploratory surgery was offered s/p ex lap with SBR on 12/20/24.     Postoperatively, patient has been tachycardic up to 140.  Continues to have issues with pain control.     Plan:  -Increase metoprolol to 5 mg  -Added oxycodone, Toradol for pain control, increased PCA to 0.2  -Simethicone for gas  -Dulcolax suppository  -Lovenox  -ISS  -Appreciate medicine comanagement      Seen, examined, and discussed with chief resident, who will discuss with staff.  - - - - - - - - - - - - - - - - - -  Haley Sam  PGY-1  General Surgery

## 2024-12-22 NOTE — PROGRESS NOTES
[facility]    Medicine Consult Progress Note - Hospitalist Service, GOLD TEAM 7    Date of Admission:  12/20/2024    Assessment & Plan   Andrew Collier is a 59 year old male admitted on 12/20/2024. He has a history of HIV on Biktarvy , DM2 , methamphetamine use , multiple admissions with SBO of unknown cause ( most recent on 12/17-12/19) managed non surgically with NG tube LIS  , presented with complaints of abdominal pain , found to have SBO now s/p small bowel resection.    Internal medicine consulted for medical co-management.    #SBO s/p small bowel resection  Has history of recurrent SBOs , most recent on 12/17-12/19 managed non surgically with NG tube LIS. Presented with abdominal pain in LLQ radiating with mild nausea . No vomiting . Got discharged yesterday ate a sandwich . This morning after eating some chicken   developed the above symptoms. Last BM yesterday night. Coming in , afebrile, hemodynamically stable , WBC 7.5 , lipase 57 , LFTs WNL, UA shows no evidence of infection. Abdominal XR shows  no definite pneumoperitoneum. Mild to moderate colonic stool burden. CT Abd pelvis W contrast shows small bowel obstruction with a dilated bowel  loop measuring up to 4.7 cm with suspicion of developing closed loop obstruction , similar to admission before.   - Surgery primary, ongoing management per surgery  - Post operative pain management per surgery team    #Sinus tachycardia  - Would recommend against using beta blockers for treatment of sinus tachycardia and rather workup for and treat the etiology of the tachycardia (sepsis, pain, anxiety, hypovolemia, bleeding, etc)  - Further management per primary  - Agree with cardiac monitoring for now    #Diabetes mellitus type 2   #Hyperglycemia   Coming in , BG elevated to 392. Per patient, he takes his home insulin regularly which is 27U glargine BID and aspart 10U TID with meals . Last insulin he took was  glargine this morning and aspart with the meal .  - Increase lantus to 10 units daily  - High intensity sliding scale insulin q4h while NPO  - Low threshold to initiate insulin gtt if BG remain uncontrolled    # Chronic conditions without changes  # HIV: Continue PTA Biktarvy  # History of asthma: Albuterol inhaler as needed          Diet: NPO for Medical/Clinical Reasons Except for: Meds, Ice Chips    DVT Prophylaxis: Defer to primary service  Potts Catheter: PRESENT, indication: Surgical procedure  Lines: None     Cardiac Monitoring: ACTIVE order. Indication: POD1 s/p ex lap with significant tachycardia  Code Status: Full Code      Clinically Significant Risk Factors         # Hyponatremia: Lowest Na = 133 mmol/L in last 2 days, will monitor as appropriate   # Hypocalcemia: Lowest Ca = 8.2 mg/dL in last 2 days, will monitor and replace as appropriate     # Hypoalbuminemia: Lowest albumin = 3.4 g/dL at 12/21/2024  9:21 AM, will monitor as appropriate               # DMII: A1C = 10.5 % (Ref range: <5.7 %) within past 6 months       # Financial/Environmental Concerns: unemployed  # Support System: poor social support noted in nursing assessment  # Housing Instability: noted in nursing assessment         Social Drivers of Health    Food Insecurity: Low Risk  (12/21/2024)    Food Insecurity     Within the past 12 months, did you worry that your food would run out before you got money to buy more?: No     Within the past 12 months, did the food you bought just not last and you didn t have money to get more?: No   Recent Concern: Food Insecurity - High Risk (10/24/2024)    Food Insecurity     Within the past 12 months, did you worry that your food would run out before you got money to buy more?: Yes     Within the past 12 months, did the food you bought just not last and you didn t have money to get more?: Yes   Housing Stability: High Risk (12/21/2024)    Housing Stability     Do you have housing? : No     Are you  worried about losing your housing?: Yes   Tobacco Use: High Risk (12/12/2024)    Patient History     Smoking Tobacco Use: Some Days     Smokeless Tobacco Use: Former   Financial Resource Strain: High Risk (12/21/2024)    Financial Resource Strain     Within the past 12 months, have you or your family members you live with been unable to get utilities (heat, electricity) when it was really needed?: Yes   Alcohol Use: Unknown (8/27/2020)    AUDIT-C     Average Number of Drinks: Patient declined   Transportation Needs: High Risk (12/21/2024)    Transportation Needs     Within the past 12 months, has lack of transportation kept you from medical appointments, getting your medicines, non-medical meetings or appointments, work, or from getting things that you need?: Yes   Interpersonal Safety: Low Risk  (12/21/2024)    Interpersonal Safety     Do you feel physically and emotionally safe where you currently live?: Yes     Within the past 12 months, have you been hit, slapped, kicked or otherwise physically hurt by someone?: No     Within the past 12 months, have you been humiliated or emotionally abused in other ways by your partner or ex-partner?: No   Recent Concern: Interpersonal Safety - High Risk (10/24/2024)    Interpersonal Safety     Do you feel physically and emotionally safe where you currently live?: No     Within the past 12 months, have you been hit, slapped, kicked or otherwise physically hurt by someone?: No     Within the past 12 months, have you been humiliated or emotionally abused in other ways by your partner or ex-partner?: No    Received from [facility], [facility]    Social Connections          Disposition Plan     Medically Ready for Discharge: Defer to primary service    Recommendations communicated to primary team via note.         Stephanie Sheehan MD  Hospitalist Service, [facility] TEAM [address] [place] [address]  [facility]  Securely message with [employer] info)  Text page via [facility] Paging/Directory   See signed in provider for up to date coverage information  ______________________________________________________________________    Interval History   Patient reports feeling better, though pain remains uncontrolled. He is on PCA now. He feels like toradol worked best for his pain - he got this earlier today per surgery. Had Tmax 100.5F yesterday. No further fevers. Remains NPO.     Physical Exam   Vital Signs: Temp: 99.8  F (37.7  C) Temp src: Oral BP: (!) 141/83 Pulse: 115   Resp: 16 SpO2: 93 % O2 Device: None (Room air)    Weight: 0 lbs 0 oz  Physical Exam  Constitutional:       General: He is in acute distress (mild to moderate due to pain).      Appearance: Normal appearance. He is not ill-appearing or toxic-appearing.   HENT:      Head: Normocephalic and atraumatic.      Right Ear: External ear normal.      Left Ear: External ear normal.      Nose: Nose normal.      Mouth/Throat:      Mouth: Mucous membranes are moist.   Eyes:      Extraocular Movements: Extraocular movements intact.      Conjunctiva/sclera: Conjunctivae normal.   Cardiovascular:      Rate and Rhythm: Tachycardia present.   Pulmonary:      Effort: Pulmonary effort is normal. No respiratory distress.   Abdominal:      Comments: Midline abdominal surgical incision with staples in place, no bleeding or drainage noted   Musculoskeletal:         General: Normal range of motion.      Cervical back: Normal range of motion.   Skin:     Findings: No rash.   Neurological:      General: No focal deficit present.      Mental Status: He is alert.       Medical Decision Making       55 MINUTES SPENT BY ME on the date of service doing chart review, history, exam, documentation & further activities per the note.      Data     I have personally reviewed the following data over the past 24 hrs:    7.9  \   13.5   / 260     136 105 17.7 /  194 (H)   4.1 17 (L) 0.70  \       Imaging results reviewed over the past 24 hrs:   No results found for this or any previous visit (from the past 24 hours).

## 2024-12-22 NOTE — PLAN OF CARE
OT: Defer. pt. chart review,discussion with interdisciplinary team and pt. obervation. pt. is at near baseline with BADLs/mobility amb. with FWW. Pt. limited mostly by pain. No acute IP OT needs identified at this time. Defer to PT for d/c recs.

## 2024-12-22 NOTE — PLAN OF CARE
Goal Outcome Evaluation: Assumed cares [id number]. Pt A&Ox4, VSS- except tachycardia (110-120's), on RA. C/o abdominal pain and gas build up, managed with dilaudid PCA and pt states they feel some relief when sitting up and burping. Pt also c/o acid reflux, managed with PRN tums. L PIV running NS @125 mL/hr. Voiding via granados, no BM. Midline incision intact with no new drainage. NPO except for ice chips. Continue with plan of care.       Plan of Care Reviewed With: patient    Overall Patient Progress: improvingOverall Patient Progress: improving

## 2024-12-22 NOTE — PROGRESS NOTES
EGS Progress Note    Patient having a lot of pain this AM. No nausea or emesis.     /82 (BP Location: Right arm, Cuff Size: Adult Regular)   Pulse (!) 122   Temp 99.8  F (37.7  C) (Oral)   Resp 18   SpO2 93%   Alert, oriented  NLB   Tachycardic to 140 bpm   Abd soft, ND, moderately TTP   Incision with dressing c/d/I   Ext wwp     Lactate 1.6   WBC 5.7  Hgb 12.8 from 13.4     A/P: 59 year old male with history significant for recurrent SBO, poorly controlled T2DM, HIV on ART, and recent admission for SBO managed conservatively with decompression and gastrograffin challenge presenting to the ED with recurrent abdominal pain. Review of CT imaging with isolated segment of dilated bowel concerning for closed loop obstruction. In setting of multiple previous admissions for obstruction, exploratory surgery was offered s/p ex lap with SBR on 12/20/24.     - EGS to take over as primary, medicine will continue following   - Pain control (offered epidural but declined, start PCA)  - NPO, IVF  - Lovenox   - ISS, lantus 5 mg qAM  - encourage minimal mobility for now    Seen with chief resident who d/w staff.     Neris Parikh, DO  General Surgery, PGY-2

## 2024-12-22 NOTE — CONSULTS
[facility]  Consult Note - Hospitalist Service, GOLD TEAM 7  Date of Admission:  12/20/2024  Consult Requested by: general surgery  Reason for Consult: medical co-management    Assessment & Plan   Andrew Collier is a 59 year old male admitted on 12/20/2024. He has a history of HIV on Biktarvy , DM2 , methamphetamine use , multiple admissions with SBO of unknown cause ( most recent on 12/17-12/19) managed non surgically with NG tube LIS  , presented with complaints of abdominal pain , found to have SBO now s/p small bowel resection.    Internal medicine consulted for medical co-management.    #SBO s/p small bowel resection  Has history of recurrent SBOs , most recent on 12/17-12/19 managed non surgically with NG tube LIS. Presented with abdominal pain in LLQ radiating with mild nausea . No vomiting . Got discharged yesterday ate a sandwich . This morning after eating some chicken   developed the above symptoms. Last BM yesterday night. Coming in , afebrile, hemodynamically stable , WBC 7.5 , lipase 57 , LFTs WNL, UA shows no evidence of infection. Abdominal XR shows  no definite pneumoperitoneum. Mild to moderate colonic stool burden. CT Abd pelvis W contrast shows small bowel obstruction with a dilated bowel  loop measuring up to 4.7 cm with suspicion of developing closed loop obstruction , similar to admission before.   - Surgery primary, ongoing management per surgery  - Post operative pain management per surgery team  - NGT to LIWS per surgery team    # Sinus tachycardia  - Would recommend against using beta blockers for treatment of sinus tachycardia and rather workup for and treat the etiology of the tachycardia (sepsis, pain, anxiety, hypovolemia, bleeding, etc)    #Diabetes mellitus type 2   #Hyperglycemia   Coming in , BG elevated to 392. . Per patient , he takes his home insulin regularly which is 27U glargine BID and aspart 10U TID with meals . Last  insulin he took was glargine this morning and aspart with the meal .  - Resume lantus at reduced dose of 5 units daily  - High intensity sliding scale insulin q4h while NPO  - Low threshold to initiate insulin gtt if BG remain uncontrolled    # Chronic conditions without changes  # HIV: Continue PTA Biktarvy  # History of asthma: Albuterol inhaler as needed     Clinically Significant Risk Factors Present on Admission         # Hyponatremia: Lowest Na = 133 mmol/L in last 2 days, will monitor as appropriate   # Hypocalcemia: Lowest Ca = 8.2 mg/dL in last 2 days, will monitor and replace as appropriate     # Hypoalbuminemia: Lowest albumin = 3.4 g/dL at 12/21/2024  9:21 AM, will monitor as appropriate              # DMII: A1C = 10.5 % (Ref range: <5.7 %) within past 6 months       # Financial/Environmental Concerns: unemployed  # Support System: poor social support noted in nursing assessment  # Housing Instability: noted in nursing assessment         Stephanie Sheehan MD  Hospitalist Service, [facility] TEAM 7  Securely message with [employer] (more info)  Text page via [employer] Paging/Directory   See signed in provider for up to date coverage information  ______________________________________________________________________    Chief Complaint   S/p small bowel resection, medical co-management    History is obtained from the patient    History of Present Illness   See medicine H&P from 12/20/2024 for further details of HPI.     Patient taken to OR overnight and had small bowel resection by general surgery. Now general surgery team is primary post operatively.     Patient having significant abdominal pain this AM. No shortness of breath or chest pain.       Past Medical History    Past Medical History:   Diagnosis Date    Acute appendicitis with localized peritonitis 1/31/2018    AIDS (H)     Allergic rhinitis due to other allergen     DNS    Anal dysplasia     Chronic abdominal pain     CNS toxoplasmosis (H)     COVID-19  1/11/2022    Diabetes type 2, controlled (H)     GERD (gastroesophageal reflux disease)     Herpes zoster 9/23/2016    History of seizure     History of substance abuse (H)     HIV (human immunodeficiency virus infection) (H)     HLD (hyperlipidemia)     Lung nodules     Periungual wart     Pneumonia 1/6/2019    PTSD (post-traumatic stress disorder)     Sleep apnea     doesn't use CPAP       Past Surgical History   Past Surgical History:   Procedure Laterality Date    ANOSCOPY N/A 9/9/2020    Procedure: Exam Under Anesthesia, ANOSCOPY, fulgeration of rectal fissures with Rectal Biopsies;  Surgeon: Thanh Lundberg MD;  Location: [facility] OR    COLONOSCOPY Left 1/22/2016    Procedure: COMBINED COLONOSCOPY, SINGLE OR MULTIPLE BIOPSY/POLYPECTOMY BY BIOPSY;  Surgeon: Clark Saini MD;  Location: [facility] GI    ESOPHAGOSCOPY, GASTROSCOPY, DUODENOSCOPY (EGD), COMBINED N/A 6/6/2022    Procedure: ESOPHAGOGASTRODUODENOSCOPY, WITH BIOPSY;  Surgeon: Kecia Benítez MD;  Location: [facility] GI    ESOPHAGOSCOPY, GASTROSCOPY, DUODENOSCOPY (EGD), COMBINED N/A 10/10/2024    Procedure: Esophagoscopy, gastroscopy, duodenoscopy (EGD), combined;  Surgeon: Nico Bhatti DO;  Location: [facility] GI    HC EXPLORE UNDESC TESTIS,INGUIN/SCROTAL      LAPAROSCOPIC APPENDECTOMY N/A 1/31/2018    Procedure: LAPAROSCOPIC APPENDECTOMY;  LAPAROSCOPIC APPENDECTOMY;  Surgeon: Dawn Holt MD;  Location: [facility] OR    LAPAROSCOPY DIAGNOSTIC (GENERAL) N/A 7/26/2016    Procedure: LAPAROSCOPY DIAGNOSTIC (GENERAL);  Surgeon: Susannah Arriaga MD;  Location: [facility] OR    LAPAROSCOPY DIAGNOSTIC (GENERAL) N/A 4/16/2018    Procedure: LAPAROSCOPY DIAGNOSTIC (GENERAL);  Diagnostic laparoscopy and lysis of adhesions;  Surgeon: Prince Dowling MD;  Location: [facility] OR    OPTICAL TRACKING SYSTEM CRANIOTOMY, EXCISE TUMOR, COMBINED Left 4/10/2015    Procedure: COMBINED OPTICAL TRACKING SYSTEM CRANIOTOMY, EXCISE TUMOR;  Surgeon: Mirlande Colmenares MD;  Location: [facility] OR     REPAIR STEFANY'S THUMB Right 12/2/2016    Procedure: REPAIR LIGAMENT ULNAR COLLATERAL THUMB (GAMEKEEPER'S);  Surgeon: Evin Zamorano MD;  Location:  OR    [facility] NONSPECIFIC PROCEDURE      right forearm fracture       Medications   I have reviewed this patient's current medications          Physical Exam   Vital Signs: Temp: (!) 100.5  F (38.1  C) Temp src: Oral BP: (!) 138/94 Pulse: (!) 125   Resp: 16 SpO2: 97 % O2 Device: None (Room air) Oxygen Delivery: 2 LPM  Weight: 0 lbs 0 oz    Physical Exam  Constitutional:       General: He is in acute distress (moderate due to pain).      Appearance: He is not ill-appearing or toxic-appearing.   HENT:      Head: Normocephalic and atraumatic.      Right Ear: External ear normal.      Left Ear: External ear normal.      Nose: Nose normal.      Mouth/Throat:      Mouth: Mucous membranes are moist.   Eyes:      Extraocular Movements: Extraocular movements intact.      Conjunctiva/sclera: Conjunctivae normal.   Pulmonary:      Effort: Pulmonary effort is normal. No respiratory distress.   Abdominal:      General: There is no distension.      Palpations: Abdomen is soft.      Tenderness: There is abdominal tenderness (diffuse). There is no guarding or rebound.   Musculoskeletal:         General: Normal range of motion.      Cervical back: Normal range of motion.   Skin:     Findings: No rash.   Neurological:      Mental Status: He is alert.     Medical Decision Making       55 MINUTES SPENT BY ME on the date of service doing chart review, history, exam, documentation & further activities per the note.      Data     I have personally reviewed the following data over the past 24 hrs:    5.7  \   12.8 (L)   / 311     135 103 20.3 /  265 (H)   3.6 20 (L) 0.84 \     ALT: <5 AST: 18 AP: 72 TBILI: 1.3 (H)   ALB: 3.4 (L) TOT PROTEIN: 6.3 (L) LIPASE: N/A     Procal: N/A CRP: N/A Lactic Acid: 1.6         Imaging results reviewed over the past 24 hrs:   No results found for this  or any previous visit (from the past 24 hours).

## 2024-12-22 NOTE — PLAN OF CARE
Goal Outcome Evaluation:      Plan of Care Reviewed With: patient    Overall Patient Progress: no changeOverall Patient Progress: no change    Outcome Evaluation: Pt A&Ox4. VSS on RA, sinus tachy 120-130s. Q6 IV metoprolol increased. PCA dilaudid pump in use along with PRN oxy, pt continues to rate pain 9-10/10. L PIV infusing PCA and NS @ 125 mL/hr. Given suppository this AM, no BM. Potts removed. Midline incision CDI. Continue POC.

## 2024-12-23 ENCOUNTER — APPOINTMENT (OUTPATIENT)
Dept: GENERAL RADIOLOGY | Facility: CLINIC | Age: 59
End: 2024-12-23
Attending: STUDENT IN AN ORGANIZED HEALTH CARE EDUCATION/TRAINING PROGRAM
Payer: COMMERCIAL

## 2024-12-23 ENCOUNTER — APPOINTMENT (OUTPATIENT)
Dept: GENERAL RADIOLOGY | Facility: CLINIC | Age: 59
End: 2024-12-23
Payer: COMMERCIAL

## 2024-12-23 LAB
ANION GAP SERPL CALCULATED.3IONS-SCNC: 13 MMOL/L (ref 7–15)
ATRIAL RATE - MUSE: 129 BPM
BASE EXCESS BLDV CALC-SCNC: -7.7 MMOL/L (ref -3–3)
BUN SERPL-MCNC: 28.9 MG/DL (ref 8–23)
CALCIUM SERPL-MCNC: 9 MG/DL (ref 8.8–10.4)
CHLORIDE SERPL-SCNC: 108 MMOL/L (ref 98–107)
CREAT SERPL-MCNC: 1.04 MG/DL (ref 0.67–1.17)
DIASTOLIC BLOOD PRESSURE - MUSE: NORMAL MMHG
EGFRCR SERPLBLD CKD-EPI 2021: 82 ML/MIN/1.73M2
ERYTHROCYTE [DISTWIDTH] IN BLOOD BY AUTOMATED COUNT: 13.7 % (ref 10–15)
GLUCOSE BLDC GLUCOMTR-MCNC: 114 MG/DL (ref 70–99)
GLUCOSE BLDC GLUCOMTR-MCNC: 118 MG/DL (ref 70–99)
GLUCOSE BLDC GLUCOMTR-MCNC: 121 MG/DL (ref 70–99)
GLUCOSE BLDC GLUCOMTR-MCNC: 134 MG/DL (ref 70–99)
GLUCOSE BLDC GLUCOMTR-MCNC: 135 MG/DL (ref 70–99)
GLUCOSE BLDC GLUCOMTR-MCNC: 158 MG/DL (ref 70–99)
GLUCOSE BLDC GLUCOMTR-MCNC: 224 MG/DL (ref 70–99)
GLUCOSE BLDC GLUCOMTR-MCNC: 229 MG/DL (ref 70–99)
GLUCOSE BLDC GLUCOMTR-MCNC: 248 MG/DL (ref 70–99)
GLUCOSE BLDC GLUCOMTR-MCNC: 250 MG/DL (ref 70–99)
GLUCOSE BLDC GLUCOMTR-MCNC: 90 MG/DL (ref 70–99)
GLUCOSE BLDC GLUCOMTR-MCNC: 91 MG/DL (ref 70–99)
GLUCOSE BLDC GLUCOMTR-MCNC: 95 MG/DL (ref 70–99)
GLUCOSE SERPL-MCNC: 273 MG/DL (ref 70–99)
HCO3 BLDV-SCNC: 20 MMOL/L (ref 21–28)
HCO3 SERPL-SCNC: 19 MMOL/L (ref 22–29)
HCT VFR BLD AUTO: 40.4 % (ref 40–53)
HGB BLD-MCNC: 13 G/DL (ref 13.3–17.7)
INTERPRETATION ECG - MUSE: NORMAL
MCH RBC QN AUTO: 28.5 PG (ref 26.5–33)
MCHC RBC AUTO-ENTMCNC: 32.2 G/DL (ref 31.5–36.5)
MCV RBC AUTO: 89 FL (ref 78–100)
O2/TOTAL GAS SETTING VFR VENT: 21 %
OXYHGB MFR BLDV: 58 % (ref 70–75)
P AXIS - MUSE: 25 DEGREES
PCO2 BLDV: 49 MM HG (ref 40–50)
PH BLDV: 7.23 [PH] (ref 7.32–7.43)
PLATELET # BLD AUTO: 271 10E3/UL (ref 150–450)
PO2 BLDV: 35 MM HG (ref 25–47)
POTASSIUM SERPL-SCNC: 4.3 MMOL/L (ref 3.4–5.3)
PR INTERVAL - MUSE: 128 MS
QRS DURATION - MUSE: 84 MS
QT - MUSE: 314 MS
QTC - MUSE: 460 MS
R AXIS - MUSE: -27 DEGREES
RBC # BLD AUTO: 4.56 10E6/UL (ref 4.4–5.9)
SAO2 % BLDV: 59.1 % (ref 70–75)
SODIUM SERPL-SCNC: 140 MMOL/L (ref 135–145)
SYSTOLIC BLOOD PRESSURE - MUSE: NORMAL MMHG
T AXIS - MUSE: 14 DEGREES
VENTRICULAR RATE- MUSE: 129 BPM
WBC # BLD AUTO: 4.5 10E3/UL (ref 4–11)

## 2024-12-23 PROCEDURE — 250N000011 HC RX IP 250 OP 636

## 2024-12-23 PROCEDURE — 250N000009 HC RX 250: Performed by: INTERNAL MEDICINE

## 2024-12-23 PROCEDURE — 80048 BASIC METABOLIC PNL TOTAL CA: CPT | Performed by: STUDENT IN AN ORGANIZED HEALTH CARE EDUCATION/TRAINING PROGRAM

## 2024-12-23 PROCEDURE — 99207 PR NO BILLABLE SERVICE THIS VISIT: CPT | Performed by: PHYSICIAN ASSISTANT

## 2024-12-23 PROCEDURE — 250N000011 HC RX IP 250 OP 636: Performed by: STUDENT IN AN ORGANIZED HEALTH CARE EDUCATION/TRAINING PROGRAM

## 2024-12-23 PROCEDURE — 99233 SBSQ HOSP IP/OBS HIGH 50: CPT | Performed by: INTERNAL MEDICINE

## 2024-12-23 PROCEDURE — 999N000248 HC STATISTIC IV INSERT WITH US BY RN

## 2024-12-23 PROCEDURE — 999N000065 XR ABDOMEN PORT 1 VIEW

## 2024-12-23 PROCEDURE — 36415 COLL VENOUS BLD VENIPUNCTURE: CPT | Performed by: PHYSICIAN ASSISTANT

## 2024-12-23 PROCEDURE — 250N000013 HC RX MED GY IP 250 OP 250 PS 637

## 2024-12-23 PROCEDURE — 71045 X-RAY EXAM CHEST 1 VIEW: CPT

## 2024-12-23 PROCEDURE — 250N000013 HC RX MED GY IP 250 OP 250 PS 637: Performed by: STUDENT IN AN ORGANIZED HEALTH CARE EDUCATION/TRAINING PROGRAM

## 2024-12-23 PROCEDURE — 85041 AUTOMATED RBC COUNT: CPT | Performed by: STUDENT IN AN ORGANIZED HEALTH CARE EDUCATION/TRAINING PROGRAM

## 2024-12-23 PROCEDURE — 36415 COLL VENOUS BLD VENIPUNCTURE: CPT | Performed by: STUDENT IN AN ORGANIZED HEALTH CARE EDUCATION/TRAINING PROGRAM

## 2024-12-23 PROCEDURE — 84520 ASSAY OF UREA NITROGEN: CPT | Performed by: STUDENT IN AN ORGANIZED HEALTH CARE EDUCATION/TRAINING PROGRAM

## 2024-12-23 PROCEDURE — 99418 PROLNG IP/OBS E/M EA 15 MIN: CPT | Performed by: INTERNAL MEDICINE

## 2024-12-23 PROCEDURE — 250N000009 HC RX 250

## 2024-12-23 PROCEDURE — 120N000002 HC R&B MED SURG/OB UMMC

## 2024-12-23 PROCEDURE — 258N000003 HC RX IP 258 OP 636: Performed by: STUDENT IN AN ORGANIZED HEALTH CARE EDUCATION/TRAINING PROGRAM

## 2024-12-23 PROCEDURE — 85018 HEMOGLOBIN: CPT | Performed by: STUDENT IN AN ORGANIZED HEALTH CARE EDUCATION/TRAINING PROGRAM

## 2024-12-23 PROCEDURE — 74018 RADEX ABDOMEN 1 VIEW: CPT | Mod: 26 | Performed by: RADIOLOGY

## 2024-12-23 PROCEDURE — 71045 X-RAY EXAM CHEST 1 VIEW: CPT | Mod: 26 | Performed by: RADIOLOGY

## 2024-12-23 PROCEDURE — 82805 BLOOD GASES W/O2 SATURATION: CPT | Performed by: PHYSICIAN ASSISTANT

## 2024-12-23 RX ORDER — METHOCARBAMOL 500 MG/1
500 TABLET, FILM COATED ORAL 3 TIMES DAILY
Status: DISCONTINUED | OUTPATIENT
Start: 2024-12-23 | End: 2024-12-25

## 2024-12-23 RX ORDER — OXYCODONE HYDROCHLORIDE 5 MG/1
5-10 TABLET ORAL EVERY 4 HOURS PRN
Status: DISCONTINUED | OUTPATIENT
Start: 2024-12-23 | End: 2024-12-24

## 2024-12-23 RX ORDER — OLANZAPINE 5 MG/1
5 TABLET, ORALLY DISINTEGRATING ORAL
Status: DISCONTINUED | OUTPATIENT
Start: 2024-12-23 | End: 2024-12-24

## 2024-12-23 RX ORDER — DEXTROSE MONOHYDRATE 25 G/50ML
25-50 INJECTION, SOLUTION INTRAVENOUS
Status: DISCONTINUED | OUTPATIENT
Start: 2024-12-23 | End: 2024-12-23

## 2024-12-23 RX ORDER — NICOTINE POLACRILEX 4 MG
15-30 LOZENGE BUCCAL
Status: DISCONTINUED | OUTPATIENT
Start: 2024-12-23 | End: 2024-12-23

## 2024-12-23 RX ORDER — KETOROLAC TROMETHAMINE 30 MG/ML
30 INJECTION, SOLUTION INTRAMUSCULAR; INTRAVENOUS EVERY 8 HOURS PRN
Status: DISPENSED | OUTPATIENT
Start: 2024-12-23 | End: 2024-12-28

## 2024-12-23 RX ORDER — DEXTROSE MONOHYDRATE 100 MG/ML
INJECTION, SOLUTION INTRAVENOUS CONTINUOUS PRN
Status: ACTIVE | OUTPATIENT
Start: 2024-12-23

## 2024-12-23 RX ADMIN — OXYCODONE HYDROCHLORIDE 5 MG: 5 TABLET ORAL at 03:08

## 2024-12-23 RX ADMIN — METHOCARBAMOL TABLETS 500 MG: 500 TABLET, COATED ORAL at 22:15

## 2024-12-23 RX ADMIN — SODIUM CHLORIDE: 9 INJECTION, SOLUTION INTRAVENOUS at 05:24

## 2024-12-23 RX ADMIN — BICTEGRAVIR SODIUM, EMTRICITABINE, AND TENOFOVIR ALAFENAMIDE FUMARATE 1 TABLET: 50; 200; 25 TABLET ORAL at 21:44

## 2024-12-23 RX ADMIN — METOPROLOL TARTRATE 5 MG: 5 INJECTION INTRAVENOUS at 11:39

## 2024-12-23 RX ADMIN — KETOROLAC TROMETHAMINE 30 MG: 30 INJECTION, SOLUTION INTRAMUSCULAR at 10:20

## 2024-12-23 RX ADMIN — Medication: at 10:22

## 2024-12-23 RX ADMIN — METHOCARBAMOL TABLETS 500 MG: 500 TABLET, COATED ORAL at 13:50

## 2024-12-23 RX ADMIN — FAMOTIDINE 20 MG: 10 INJECTION, SOLUTION INTRAVENOUS at 20:47

## 2024-12-23 RX ADMIN — METOPROLOL TARTRATE 5 MG: 5 INJECTION INTRAVENOUS at 04:21

## 2024-12-23 RX ADMIN — METHOCARBAMOL TABLETS 500 MG: 500 TABLET, COATED ORAL at 08:06

## 2024-12-23 RX ADMIN — LIDOCAINE 4% 1 PATCH: 40 PATCH TOPICAL at 04:21

## 2024-12-23 RX ADMIN — OXYCODONE HYDROCHLORIDE 5 MG: 5 TABLET ORAL at 22:13

## 2024-12-23 RX ADMIN — ENOXAPARIN SODIUM 40 MG: 40 INJECTION SUBCUTANEOUS at 20:47

## 2024-12-23 RX ADMIN — INSULIN HUMAN 2.5 UNITS/HR: 1 INJECTION, SOLUTION INTRAVENOUS at 11:53

## 2024-12-23 RX ADMIN — FAMOTIDINE 20 MG: 20 TABLET, FILM COATED ORAL at 08:07

## 2024-12-23 ASSESSMENT — ACTIVITIES OF DAILY LIVING (ADL)
ADLS_ACUITY_SCORE: 33

## 2024-12-23 NOTE — PLAN OF CARE
Goal Outcome Evaluation:  Assumed cares [id number]. Pt A&Ox4, VSS- except tachycardia (110-120's), on 2L NC. C/o abdominal pain and gas build up, managed with dilaudid PCA and pt states they feel some relief when sitting up and burping. Also managed with PRN Oxy x2 and Simethicone x1. L PIV running NS @125 mL/hr. Voiding spontaneously, no BM. Midline incision intact with no new drainage. NPO except for ice chips. Started pt on 2L oxygen around 0600 in order to maintain sats above 92%, MD notified about the change. Continue with plan of care.       Plan of Care Reviewed With: patient    Overall Patient Progress: no changeOverall Patient Progress: no change

## 2024-12-23 NOTE — PLAN OF CARE
Goal Outcome Evaluation:      Plan of Care Reviewed With: patient    Overall Patient Progress: no changeOverall Patient Progress: no change    Outcome Evaluation: VSS on RA. NG placed today, low intermittnet suction. NPO. Insulin gtt initiated, currently 0 units/hr. Pt increasing lethargic and labored breathing this evening. RRT called. VBG ordered, PCA held. Continues to be vitally stable. Continue POC.

## 2024-12-23 NOTE — PROGRESS NOTES
Surgery Progress Note  12/23/2024       Subjective:  -Patient continues to have poor pain control. Denies fever, chills. Burping. No BM despite suppository. Ongoing tachycardia. Destaturated to 90% this morning morning, improved to 95% with 2L O2. Denies any shortness of breath.      Objective:  Temp:  [98  F (36.7  C)-99.5  F (37.5  C)] 98  F (36.7  C)  Pulse:  [114-133] 116  Resp:  [16-20] 18  BP: (122-147)/(78-99) 143/96  SpO2:  [94 %-96 %] 96 %    I/O last 3 completed shifts:  In: 39.5 [I.V.:39.5]  Out: 300 [Urine:300]      Gen: Awake, alert, NAD  CV: Sinus tachycardia  Resp: NLB on RA  Abd: soft, mildly distended, appropriately tender on palpation.   Incision: c/d/I  Ext: WWP, no edema     Labs:  Recent Labs   Lab 12/23/24  0703 12/22/24  0624 12/21/24  0921   WBC 4.5 7.9 5.7   HGB 13.0* 13.5 12.8*    260 311       Recent Labs   Lab 12/23/24  0749 12/23/24  0703 12/23/24  0424 12/22/24  0831 12/22/24  0624 12/21/24  1046 12/21/24  0921   NA  --  140  --   --  136  --  135   POTASSIUM  --  4.3  --   --  4.1  --  3.6   CHLORIDE  --  108*  --   --  105  --  103   CO2  --  19*  --   --  17*  --  20*   BUN  --  28.9*  --   --  17.7  --  20.3   CR  --  1.04  --   --  0.70  --  0.84   * 273* 250*   < > 237*   < > 297*   LINDA  --  9.0  --   --  8.8  --  8.2*    < > = values in this interval not displayed.       Imaging:  CT abdomen pelvis 12/20/2024  IMPRESSION:      Dilated loop of small bowel measuring up to 4.7 cm in the central  abdomen compatible with bowel obstruction; with 2 apparent transition  points, developing closed-loop obstruction not entirely excluded. No  pneumatosis, mesenteric venous gas or portal venous gas.     Assessment/Plan:   59 year old male with history significant for recurrent SBO, poorly controlled T2DM, HIV on ART, and recent admission for SBO managed conservatively with decompression and gastrograffin challenge presenting to the ED with recurrent abdominal pain. Review of CT  imaging with isolated segment of dilated bowel concerning for closed loop obstruction. In setting of multiple previous admissions for obstruction, exploratory surgery was offered s/p ex lap with SBR on 12/20/24.     Postoperatively, patient's pain has been difficult to control. Multimodal pain control added yesterday to assist with management. Continue NPO while awaiting return of bowel function.     Plan:  - NPO, ok for ice chips  - AROBF  - scheduled toradol for 48 hours  - increased PCA to 0.3  - oxycodone frequency increased to q4hrs  -Simethicone for gas  -Lovenox  -Appreciate medicine comanagement      Seen, examined, and discussed with chief resident, who will discuss with staff.  - - - - - - - - - - - - - - - - - -  Haley Sam  PGY-1  General Surgery

## 2024-12-24 ENCOUNTER — ANESTHESIA (OUTPATIENT)
Dept: SURGERY | Facility: CLINIC | Age: 59
End: 2024-12-24
Payer: COMMERCIAL

## 2024-12-24 ENCOUNTER — APPOINTMENT (OUTPATIENT)
Dept: GENERAL RADIOLOGY | Facility: CLINIC | Age: 59
End: 2024-12-24
Payer: COMMERCIAL

## 2024-12-24 ENCOUNTER — APPOINTMENT (OUTPATIENT)
Dept: GENERAL RADIOLOGY | Facility: CLINIC | Age: 59
End: 2024-12-24
Attending: STUDENT IN AN ORGANIZED HEALTH CARE EDUCATION/TRAINING PROGRAM
Payer: COMMERCIAL

## 2024-12-24 ENCOUNTER — APPOINTMENT (OUTPATIENT)
Dept: CT IMAGING | Facility: CLINIC | Age: 59
End: 2024-12-24
Payer: COMMERCIAL

## 2024-12-24 ENCOUNTER — ANESTHESIA EVENT (OUTPATIENT)
Dept: SURGERY | Facility: CLINIC | Age: 59
End: 2024-12-24
Payer: COMMERCIAL

## 2024-12-24 LAB
ABO + RH BLD: NORMAL
ALBUMIN UR-MCNC: 70 MG/DL
ANION GAP SERPL CALCULATED.3IONS-SCNC: 13 MMOL/L (ref 7–15)
ANION GAP SERPL CALCULATED.3IONS-SCNC: 15 MMOL/L (ref 7–15)
APPEARANCE UR: CLEAR
BASE EXCESS BLDA CALC-SCNC: -6 MMOL/L (ref -3–3)
BASE EXCESS BLDA CALC-SCNC: -8.8 MMOL/L (ref -3–3)
BASE EXCESS BLDV CALC-SCNC: -8.3 MMOL/L (ref -3–3)
BASOPHILS # BLD AUTO: 0 10E3/UL (ref 0–0.2)
BASOPHILS NFR BLD AUTO: 1 %
BILIRUB UR QL STRIP: NEGATIVE
BLD GP AB SCN SERPL QL: NEGATIVE
BUN SERPL-MCNC: 48.1 MG/DL (ref 8–23)
BUN SERPL-MCNC: 48.2 MG/DL (ref 8–23)
CA-I BLD-MCNC: 4.3 MG/DL (ref 4.4–5.2)
CA-I BLD-MCNC: 5.1 MG/DL (ref 4.4–5.2)
CALCIUM SERPL-MCNC: 8.6 MG/DL (ref 8.8–10.4)
CALCIUM SERPL-MCNC: 8.9 MG/DL (ref 8.8–10.4)
CHLORIDE SERPL-SCNC: 112 MMOL/L (ref 98–107)
CHLORIDE SERPL-SCNC: 116 MMOL/L (ref 98–107)
COLOR UR AUTO: YELLOW
CREAT SERPL-MCNC: 1.44 MG/DL (ref 0.67–1.17)
CREAT SERPL-MCNC: 1.83 MG/DL (ref 0.67–1.17)
EGFRCR SERPLBLD CKD-EPI 2021: 41 ML/MIN/1.73M2
EGFRCR SERPLBLD CKD-EPI 2021: 55 ML/MIN/1.73M2
EOSINOPHIL # BLD AUTO: 0 10E3/UL (ref 0–0.7)
EOSINOPHIL NFR BLD AUTO: 0 %
ERYTHROCYTE [DISTWIDTH] IN BLOOD BY AUTOMATED COUNT: 13.9 % (ref 10–15)
ERYTHROCYTE [DISTWIDTH] IN BLOOD BY AUTOMATED COUNT: 14.2 % (ref 10–15)
GLUCOSE BLD-MCNC: 154 MG/DL (ref 70–99)
GLUCOSE BLD-MCNC: 192 MG/DL (ref 70–99)
GLUCOSE BLDC GLUCOMTR-MCNC: 112 MG/DL (ref 70–99)
GLUCOSE BLDC GLUCOMTR-MCNC: 127 MG/DL (ref 70–99)
GLUCOSE BLDC GLUCOMTR-MCNC: 142 MG/DL (ref 70–99)
GLUCOSE BLDC GLUCOMTR-MCNC: 143 MG/DL (ref 70–99)
GLUCOSE BLDC GLUCOMTR-MCNC: 170 MG/DL (ref 70–99)
GLUCOSE BLDC GLUCOMTR-MCNC: 178 MG/DL (ref 70–99)
GLUCOSE BLDC GLUCOMTR-MCNC: 180 MG/DL (ref 70–99)
GLUCOSE BLDC GLUCOMTR-MCNC: 183 MG/DL (ref 70–99)
GLUCOSE BLDC GLUCOMTR-MCNC: 190 MG/DL (ref 70–99)
GLUCOSE BLDC GLUCOMTR-MCNC: 194 MG/DL (ref 70–99)
GLUCOSE BLDC GLUCOMTR-MCNC: 195 MG/DL (ref 70–99)
GLUCOSE BLDC GLUCOMTR-MCNC: 202 MG/DL (ref 70–99)
GLUCOSE BLDC GLUCOMTR-MCNC: 202 MG/DL (ref 70–99)
GLUCOSE SERPL-MCNC: 164 MG/DL (ref 70–99)
GLUCOSE SERPL-MCNC: 190 MG/DL (ref 70–99)
GLUCOSE UR STRIP-MCNC: 70 MG/DL
HCO3 BLDA-SCNC: 17 MMOL/L (ref 21–28)
HCO3 BLDA-SCNC: 19 MMOL/L (ref 21–28)
HCO3 BLDV-SCNC: 16 MMOL/L (ref 21–28)
HCO3 SERPL-SCNC: 15 MMOL/L (ref 22–29)
HCO3 SERPL-SCNC: 16 MMOL/L (ref 22–29)
HCT VFR BLD AUTO: 32 % (ref 40–53)
HCT VFR BLD AUTO: 37.1 % (ref 40–53)
HGB BLD-MCNC: 10.8 G/DL (ref 13.3–17.7)
HGB BLD-MCNC: 11 G/DL (ref 13.3–17.7)
HGB BLD-MCNC: 12.4 G/DL (ref 13.3–17.7)
HGB BLD-MCNC: 8.9 G/DL (ref 13.3–17.7)
HGB UR QL STRIP: ABNORMAL
HOLD SPECIMEN: NORMAL
HOLD SPECIMEN: NORMAL
IMM GRANULOCYTES # BLD: 0 10E3/UL
IMM GRANULOCYTES NFR BLD: 0 %
KETONES UR STRIP-MCNC: 10 MG/DL
LACTATE BLD-SCNC: 1.1 MMOL/L (ref 0.7–2)
LACTATE BLD-SCNC: 1.4 MMOL/L (ref 0.7–2)
LACTATE SERPL-SCNC: 1.7 MMOL/L (ref 0.7–2)
LEUKOCYTE ESTERASE UR QL STRIP: NEGATIVE
LYMPHOCYTES # BLD AUTO: 0.2 10E3/UL (ref 0.8–5.3)
LYMPHOCYTES NFR BLD AUTO: 10 %
MCH RBC QN AUTO: 28.6 PG (ref 26.5–33)
MCH RBC QN AUTO: 28.7 PG (ref 26.5–33)
MCHC RBC AUTO-ENTMCNC: 33.4 G/DL (ref 31.5–36.5)
MCHC RBC AUTO-ENTMCNC: 33.8 G/DL (ref 31.5–36.5)
MCV RBC AUTO: 85 FL (ref 78–100)
MCV RBC AUTO: 86 FL (ref 78–100)
MONOCYTES # BLD AUTO: 0.2 10E3/UL (ref 0–1.3)
MONOCYTES NFR BLD AUTO: 9 %
MUCOUS THREADS #/AREA URNS LPF: PRESENT /LPF
NEUTROPHILS # BLD AUTO: 1.9 10E3/UL (ref 1.6–8.3)
NEUTROPHILS NFR BLD AUTO: 80 %
NITRATE UR QL: NEGATIVE
NRBC # BLD AUTO: 0 10E3/UL
NRBC BLD AUTO-RTO: 1 /100
O2/TOTAL GAS SETTING VFR VENT: 21 %
O2/TOTAL GAS SETTING VFR VENT: 21 %
O2/TOTAL GAS SETTING VFR VENT: 34 %
OXYHGB MFR BLDA: 95 % (ref 92–100)
OXYHGB MFR BLDA: 95 % (ref 92–100)
OXYHGB MFR BLDV: 93 % (ref 70–75)
PCO2 BLDA: 33 MM HG (ref 35–45)
PCO2 BLDA: 33 MM HG (ref 35–45)
PCO2 BLDV: 30 MM HG (ref 40–50)
PH BLDA: 7.31 [PH] (ref 7.35–7.45)
PH BLDA: 7.36 [PH] (ref 7.35–7.45)
PH BLDV: 7.34 [PH] (ref 7.32–7.43)
PH UR STRIP: 6 [PH] (ref 5–7)
PLATELET # BLD AUTO: 241 10E3/UL (ref 150–450)
PLATELET # BLD AUTO: 259 10E3/UL (ref 150–450)
PO2 BLDA: 88 MM HG (ref 80–105)
PO2 BLDA: 89 MM HG (ref 80–105)
PO2 BLDV: 74 MM HG (ref 25–47)
POTASSIUM BLD-SCNC: 2.8 MMOL/L (ref 3.4–5.3)
POTASSIUM BLD-SCNC: 3.5 MMOL/L (ref 3.4–5.3)
POTASSIUM SERPL-SCNC: 3.4 MMOL/L (ref 3.4–5.3)
POTASSIUM SERPL-SCNC: 3.8 MMOL/L (ref 3.4–5.3)
RADIOLOGIST FLAGS: ABNORMAL
RBC # BLD AUTO: 3.77 10E6/UL (ref 4.4–5.9)
RBC # BLD AUTO: 4.32 10E6/UL (ref 4.4–5.9)
RBC URINE: 2 /HPF
SAO2 % BLDA: 97 % (ref 96–97)
SAO2 % BLDA: 97 % (ref 96–97)
SAO2 % BLDV: 95 % (ref 70–75)
SARS-COV-2 RNA RESP QL NAA+PROBE: NEGATIVE
SODIUM BLD-SCNC: 145 MMOL/L (ref 135–145)
SODIUM BLD-SCNC: 146 MMOL/L (ref 135–145)
SODIUM SERPL-SCNC: 143 MMOL/L (ref 135–145)
SODIUM SERPL-SCNC: 144 MMOL/L (ref 135–145)
SP GR UR STRIP: 1.02 (ref 1–1.03)
SPECIMEN EXP DATE BLD: NORMAL
UROBILINOGEN UR STRIP-MCNC: 2 MG/DL
WBC # BLD AUTO: 2.4 10E3/UL (ref 4–11)
WBC # BLD AUTO: 2.4 10E3/UL (ref 4–11)
WBC # BLD AUTO: 3.7 10E3/UL (ref 4–11)
WBC URINE: 1 /HPF

## 2024-12-24 PROCEDURE — 250N000013 HC RX MED GY IP 250 OP 250 PS 637

## 2024-12-24 PROCEDURE — 87070 CULTURE OTHR SPECIMN AEROBIC: CPT | Performed by: SURGERY

## 2024-12-24 PROCEDURE — 999N000141 HC STATISTIC PRE-PROCEDURE NURSING ASSESSMENT: Performed by: SURGERY

## 2024-12-24 PROCEDURE — 99291 CRITICAL CARE FIRST HOUR: CPT | Mod: GC | Performed by: INTERNAL MEDICINE

## 2024-12-24 PROCEDURE — 94002 VENT MGMT INPAT INIT DAY: CPT

## 2024-12-24 PROCEDURE — 87149 DNA/RNA DIRECT PROBE: CPT

## 2024-12-24 PROCEDURE — 250N000011 HC RX IP 250 OP 636

## 2024-12-24 PROCEDURE — 83605 ASSAY OF LACTIC ACID: CPT | Performed by: STUDENT IN AN ORGANIZED HEALTH CARE EDUCATION/TRAINING PROGRAM

## 2024-12-24 PROCEDURE — 87635 SARS-COV-2 COVID-19 AMP PRB: CPT | Performed by: STUDENT IN AN ORGANIZED HEALTH CARE EDUCATION/TRAINING PROGRAM

## 2024-12-24 PROCEDURE — 258N000003 HC RX IP 258 OP 636

## 2024-12-24 PROCEDURE — 83605 ASSAY OF LACTIC ACID: CPT | Performed by: SURGERY

## 2024-12-24 PROCEDURE — 82805 BLOOD GASES W/O2 SATURATION: CPT

## 2024-12-24 PROCEDURE — 250N000009 HC RX 250: Performed by: INTERNAL MEDICINE

## 2024-12-24 PROCEDURE — 71045 X-RAY EXAM CHEST 1 VIEW: CPT

## 2024-12-24 PROCEDURE — 71260 CT THORAX DX C+: CPT | Mod: 26 | Performed by: RADIOLOGY

## 2024-12-24 PROCEDURE — 360N000077 HC SURGERY LEVEL 4, PER MIN: Performed by: SURGERY

## 2024-12-24 PROCEDURE — 87102 FUNGUS ISOLATION CULTURE: CPT | Performed by: SURGERY

## 2024-12-24 PROCEDURE — 82565 ASSAY OF CREATININE: CPT | Performed by: STUDENT IN AN ORGANIZED HEALTH CARE EDUCATION/TRAINING PROGRAM

## 2024-12-24 PROCEDURE — 999N000065 XR CHEST PORT 1 VIEW

## 2024-12-24 PROCEDURE — 250N000025 HC SEVOFLURANE, PER MIN: Performed by: SURGERY

## 2024-12-24 PROCEDURE — 0DB80ZZ EXCISION OF SMALL INTESTINE, OPEN APPROACH: ICD-10-PCS | Performed by: SURGERY

## 2024-12-24 PROCEDURE — 272N000001 HC OR GENERAL SUPPLY STERILE: Performed by: SURGERY

## 2024-12-24 PROCEDURE — 250N000009 HC RX 250

## 2024-12-24 PROCEDURE — 36415 COLL VENOUS BLD VENIPUNCTURE: CPT | Performed by: STUDENT IN AN ORGANIZED HEALTH CARE EDUCATION/TRAINING PROGRAM

## 2024-12-24 PROCEDURE — 70450 CT HEAD/BRAIN W/O DYE: CPT

## 2024-12-24 PROCEDURE — 85014 HEMATOCRIT: CPT

## 2024-12-24 PROCEDURE — 71260 CT THORAX DX C+: CPT

## 2024-12-24 PROCEDURE — 74018 RADEX ABDOMEN 1 VIEW: CPT

## 2024-12-24 PROCEDURE — 74177 CT ABD & PELVIS W/CONTRAST: CPT | Mod: 26 | Performed by: RADIOLOGY

## 2024-12-24 PROCEDURE — 370N000017 HC ANESTHESIA TECHNICAL FEE, PER MIN: Performed by: SURGERY

## 2024-12-24 PROCEDURE — 44120 REMOVAL OF SMALL INTESTINE: CPT | Mod: 78 | Performed by: SURGERY

## 2024-12-24 PROCEDURE — 250N000011 HC RX IP 250 OP 636: Performed by: STUDENT IN AN ORGANIZED HEALTH CARE EDUCATION/TRAINING PROGRAM

## 2024-12-24 PROCEDURE — 82310 ASSAY OF CALCIUM: CPT | Performed by: STUDENT IN AN ORGANIZED HEALTH CARE EDUCATION/TRAINING PROGRAM

## 2024-12-24 PROCEDURE — 87106 FUNGI IDENTIFICATION YEAST: CPT | Performed by: SURGERY

## 2024-12-24 PROCEDURE — 99233 SBSQ HOSP IP/OBS HIGH 50: CPT | Performed by: STUDENT IN AN ORGANIZED HEALTH CARE EDUCATION/TRAINING PROGRAM

## 2024-12-24 PROCEDURE — 999N000128 HC STATISTIC PERIPHERAL IV START W/O US GUIDANCE

## 2024-12-24 PROCEDURE — 81001 URINALYSIS AUTO W/SCOPE: CPT | Performed by: STUDENT IN AN ORGANIZED HEALTH CARE EDUCATION/TRAINING PROGRAM

## 2024-12-24 PROCEDURE — 200N000002 HC R&B ICU UMMC

## 2024-12-24 PROCEDURE — 85027 COMPLETE CBC AUTOMATED: CPT | Performed by: STUDENT IN AN ORGANIZED HEALTH CARE EDUCATION/TRAINING PROGRAM

## 2024-12-24 PROCEDURE — 250N000013 HC RX MED GY IP 250 OP 250 PS 637: Performed by: STUDENT IN AN ORGANIZED HEALTH CARE EDUCATION/TRAINING PROGRAM

## 2024-12-24 PROCEDURE — 82374 ASSAY BLOOD CARBON DIOXIDE: CPT

## 2024-12-24 PROCEDURE — 82330 ASSAY OF CALCIUM: CPT

## 2024-12-24 PROCEDURE — 36415 COLL VENOUS BLD VENIPUNCTURE: CPT | Performed by: SURGERY

## 2024-12-24 PROCEDURE — 85025 COMPLETE CBC W/AUTO DIFF WBC: CPT | Performed by: STUDENT IN AN ORGANIZED HEALTH CARE EDUCATION/TRAINING PROGRAM

## 2024-12-24 PROCEDURE — 84132 ASSAY OF SERUM POTASSIUM: CPT

## 2024-12-24 PROCEDURE — 88307 TISSUE EXAM BY PATHOLOGIST: CPT | Mod: TC | Performed by: SURGERY

## 2024-12-24 PROCEDURE — 70450 CT HEAD/BRAIN W/O DYE: CPT | Mod: 26 | Performed by: STUDENT IN AN ORGANIZED HEALTH CARE EDUCATION/TRAINING PROGRAM

## 2024-12-24 PROCEDURE — 36415 COLL VENOUS BLD VENIPUNCTURE: CPT

## 2024-12-24 PROCEDURE — 93005 ELECTROCARDIOGRAM TRACING: CPT

## 2024-12-24 PROCEDURE — 71045 X-RAY EXAM CHEST 1 VIEW: CPT | Mod: 26 | Performed by: RADIOLOGY

## 2024-12-24 PROCEDURE — 86901 BLOOD TYPING SEROLOGIC RH(D): CPT | Performed by: STUDENT IN AN ORGANIZED HEALTH CARE EDUCATION/TRAINING PROGRAM

## 2024-12-24 PROCEDURE — 87075 CULTR BACTERIA EXCEPT BLOOD: CPT | Performed by: SURGERY

## 2024-12-24 PROCEDURE — 93010 ELECTROCARDIOGRAM REPORT: CPT | Performed by: INTERNAL MEDICINE

## 2024-12-24 PROCEDURE — 999N000157 HC STATISTIC RCP TIME EA 10 MIN

## 2024-12-24 PROCEDURE — 74018 RADEX ABDOMEN 1 VIEW: CPT | Mod: 26 | Performed by: RADIOLOGY

## 2024-12-24 PROCEDURE — 710N000010 HC RECOVERY PHASE 1, LEVEL 2, PER MIN: Performed by: SURGERY

## 2024-12-24 PROCEDURE — 80048 BASIC METABOLIC PNL TOTAL CA: CPT | Performed by: STUDENT IN AN ORGANIZED HEALTH CARE EDUCATION/TRAINING PROGRAM

## 2024-12-24 PROCEDURE — 80048 BASIC METABOLIC PNL TOTAL CA: CPT

## 2024-12-24 PROCEDURE — 87186 SC STD MICRODIL/AGAR DIL: CPT

## 2024-12-24 PROCEDURE — 999N000248 HC STATISTIC IV INSERT WITH US BY RN

## 2024-12-24 RX ORDER — ONDANSETRON 2 MG/ML
INJECTION INTRAMUSCULAR; INTRAVENOUS PRN
Status: DISCONTINUED | OUTPATIENT
Start: 2024-12-24 | End: 2024-12-24

## 2024-12-24 RX ORDER — SODIUM CHLORIDE, SODIUM LACTATE, POTASSIUM CHLORIDE, CALCIUM CHLORIDE 600; 310; 30; 20 MG/100ML; MG/100ML; MG/100ML; MG/100ML
INJECTION, SOLUTION INTRAVENOUS CONTINUOUS
Status: DISCONTINUED | OUTPATIENT
Start: 2024-12-24 | End: 2024-12-24 | Stop reason: HOSPADM

## 2024-12-24 RX ORDER — HYDROMORPHONE HCL IN WATER/PF 6 MG/30 ML
0.2 PATIENT CONTROLLED ANALGESIA SYRINGE INTRAVENOUS EVERY 5 MIN PRN
Status: DISCONTINUED | OUTPATIENT
Start: 2024-12-24 | End: 2024-12-24 | Stop reason: HOSPADM

## 2024-12-24 RX ORDER — OLANZAPINE 10 MG/2ML
5 INJECTION, POWDER, FOR SOLUTION INTRAMUSCULAR DAILY PRN
Status: DISCONTINUED | OUTPATIENT
Start: 2024-12-24 | End: 2024-12-26

## 2024-12-24 RX ORDER — LIDOCAINE HYDROCHLORIDE 20 MG/ML
INJECTION, SOLUTION INFILTRATION; PERINEURAL PRN
Status: DISCONTINUED | OUTPATIENT
Start: 2024-12-24 | End: 2024-12-24

## 2024-12-24 RX ORDER — FENTANYL CITRATE 50 UG/ML
INJECTION, SOLUTION INTRAMUSCULAR; INTRAVENOUS PRN
Status: DISCONTINUED | OUTPATIENT
Start: 2024-12-24 | End: 2024-12-24

## 2024-12-24 RX ORDER — POTASSIUM CHLORIDE 29.8 MG/ML
INJECTION INTRAVENOUS PRN
Status: DISCONTINUED | OUTPATIENT
Start: 2024-12-24 | End: 2024-12-24

## 2024-12-24 RX ORDER — OXYCODONE HYDROCHLORIDE 5 MG/1
5-10 TABLET ORAL EVERY 4 HOURS PRN
Status: DISCONTINUED | OUTPATIENT
Start: 2024-12-24 | End: 2024-12-25

## 2024-12-24 RX ORDER — HYDROMORPHONE HCL IN WATER/PF 6 MG/30 ML
.2-.4 PATIENT CONTROLLED ANALGESIA SYRINGE INTRAVENOUS
Status: DISCONTINUED | OUTPATIENT
Start: 2024-12-24 | End: 2024-12-25

## 2024-12-24 RX ORDER — SODIUM CHLORIDE, SODIUM LACTATE, POTASSIUM CHLORIDE, CALCIUM CHLORIDE 600; 310; 30; 20 MG/100ML; MG/100ML; MG/100ML; MG/100ML
INJECTION, SOLUTION INTRAVENOUS CONTINUOUS PRN
Status: DISCONTINUED | OUTPATIENT
Start: 2024-12-24 | End: 2024-12-24

## 2024-12-24 RX ORDER — PROPOFOL 10 MG/ML
5-75 INJECTION, EMULSION INTRAVENOUS CONTINUOUS
Status: DISCONTINUED | OUTPATIENT
Start: 2024-12-24 | End: 2024-12-26

## 2024-12-24 RX ORDER — HYDROMORPHONE HCL IN WATER/PF 6 MG/30 ML
0.4 PATIENT CONTROLLED ANALGESIA SYRINGE INTRAVENOUS EVERY 5 MIN PRN
Status: DISCONTINUED | OUTPATIENT
Start: 2024-12-24 | End: 2024-12-24 | Stop reason: HOSPADM

## 2024-12-24 RX ORDER — ONDANSETRON 4 MG/1
4 TABLET, ORALLY DISINTEGRATING ORAL EVERY 30 MIN PRN
Status: DISCONTINUED | OUTPATIENT
Start: 2024-12-24 | End: 2024-12-24 | Stop reason: HOSPADM

## 2024-12-24 RX ORDER — LABETALOL 20 MG/4 ML (5 MG/ML) INTRAVENOUS SYRINGE
PRN
Status: DISCONTINUED | OUTPATIENT
Start: 2024-12-24 | End: 2024-12-24

## 2024-12-24 RX ORDER — ETOMIDATE 2 MG/ML
INJECTION INTRAVENOUS PRN
Status: DISCONTINUED | OUTPATIENT
Start: 2024-12-24 | End: 2024-12-24

## 2024-12-24 RX ORDER — DEXAMETHASONE SODIUM PHOSPHATE 4 MG/ML
INJECTION, SOLUTION INTRA-ARTICULAR; INTRALESIONAL; INTRAMUSCULAR; INTRAVENOUS; SOFT TISSUE PRN
Status: DISCONTINUED | OUTPATIENT
Start: 2024-12-24 | End: 2024-12-24

## 2024-12-24 RX ORDER — OLANZAPINE 2.5 MG/1
5 TABLET, FILM COATED ORAL DAILY PRN
Status: DISCONTINUED | OUTPATIENT
Start: 2024-12-24 | End: 2024-12-25

## 2024-12-24 RX ORDER — CALCIUM CHLORIDE 100 MG/ML
INJECTION INTRAVENOUS; INTRAVENTRICULAR PRN
Status: DISCONTINUED | OUTPATIENT
Start: 2024-12-24 | End: 2024-12-24

## 2024-12-24 RX ORDER — ONDANSETRON 2 MG/ML
4 INJECTION INTRAMUSCULAR; INTRAVENOUS EVERY 30 MIN PRN
Status: DISCONTINUED | OUTPATIENT
Start: 2024-12-24 | End: 2024-12-24 | Stop reason: HOSPADM

## 2024-12-24 RX ORDER — PROPOFOL 10 MG/ML
INJECTION, EMULSION INTRAVENOUS CONTINUOUS PRN
Status: DISCONTINUED | OUTPATIENT
Start: 2024-12-24 | End: 2024-12-24

## 2024-12-24 RX ORDER — NALOXONE HYDROCHLORIDE 0.4 MG/ML
0.1 INJECTION, SOLUTION INTRAMUSCULAR; INTRAVENOUS; SUBCUTANEOUS
Status: DISCONTINUED | OUTPATIENT
Start: 2024-12-24 | End: 2024-12-24 | Stop reason: HOSPADM

## 2024-12-24 RX ORDER — LIDOCAINE 40 MG/G
CREAM TOPICAL
Status: ACTIVE | OUTPATIENT
Start: 2024-12-24

## 2024-12-24 RX ORDER — FAMOTIDINE 20 MG/1
20 TABLET, FILM COATED ORAL DAILY
Status: DISCONTINUED | OUTPATIENT
Start: 2024-12-24 | End: 2024-12-25

## 2024-12-24 RX ORDER — IOPAMIDOL 755 MG/ML
97 INJECTION, SOLUTION INTRAVASCULAR ONCE
Status: COMPLETED | OUTPATIENT
Start: 2024-12-24 | End: 2024-12-24

## 2024-12-24 RX ADMIN — Medication 200 MG: at 21:46

## 2024-12-24 RX ADMIN — Medication 100 MG: at 22:05

## 2024-12-24 RX ADMIN — CALCIUM CHLORIDE INJECTION 500 MG: 100 INJECTION, SOLUTION INTRAVENOUS at 19:48

## 2024-12-24 RX ADMIN — Medication 30 MG: at 19:29

## 2024-12-24 RX ADMIN — PROPOFOL 25 MCG/KG/MIN: 10 INJECTION, EMULSION INTRAVENOUS at 22:05

## 2024-12-24 RX ADMIN — SODIUM CHLORIDE, POTASSIUM CHLORIDE, SODIUM LACTATE AND CALCIUM CHLORIDE: 600; 310; 30; 20 INJECTION, SOLUTION INTRAVENOUS at 18:49

## 2024-12-24 RX ADMIN — FENTANYL CITRATE 50 MCG: 50 INJECTION INTRAMUSCULAR; INTRAVENOUS at 20:12

## 2024-12-24 RX ADMIN — POTASSIUM CHLORIDE 20 MEQ: 29.8 INJECTION, SOLUTION INTRAVENOUS at 20:17

## 2024-12-24 RX ADMIN — Medication 10 MG: at 20:58

## 2024-12-24 RX ADMIN — FAMOTIDINE 20 MG: 20 TABLET, FILM COATED ORAL at 08:07

## 2024-12-24 RX ADMIN — INSULIN HUMAN 2 UNITS/HR: 1 INJECTION, SOLUTION INTRAVENOUS at 15:46

## 2024-12-24 RX ADMIN — CALCIUM CHLORIDE INJECTION 500 MG: 100 INJECTION, SOLUTION INTRAVENOUS at 20:13

## 2024-12-24 RX ADMIN — LIDOCAINE HYDROCHLORIDE 80 MG: 20 INJECTION, SOLUTION INFILTRATION; PERINEURAL at 19:06

## 2024-12-24 RX ADMIN — Medication 100 MG: at 22:01

## 2024-12-24 RX ADMIN — LABETALOL 20 MG/4 ML (5 MG/ML) INTRAVENOUS SYRINGE 5 MG: at 22:00

## 2024-12-24 RX ADMIN — FENTANYL CITRATE 100 MCG: 50 INJECTION INTRAMUSCULAR; INTRAVENOUS at 20:55

## 2024-12-24 RX ADMIN — ONDANSETRON 4 MG: 2 INJECTION INTRAMUSCULAR; INTRAVENOUS at 21:51

## 2024-12-24 RX ADMIN — SODIUM CHLORIDE, POTASSIUM CHLORIDE, SODIUM LACTATE AND CALCIUM CHLORIDE 1000 ML: 600; 310; 30; 20 INJECTION, SOLUTION INTRAVENOUS at 17:21

## 2024-12-24 RX ADMIN — ETOMIDATE 12 MG: 2 INJECTION, SOLUTION INTRAVENOUS at 19:09

## 2024-12-24 RX ADMIN — PROPOFOL 25 MCG/KG/MIN: 10 INJECTION, EMULSION INTRAVENOUS at 22:44

## 2024-12-24 RX ADMIN — SUCCINYLCHOLINE CHLORIDE 100 MG: 20 INJECTION, SOLUTION INTRAMUSCULAR; INTRAVENOUS; PARENTERAL at 19:09

## 2024-12-24 RX ADMIN — LIDOCAINE 4% 1 PATCH: 40 PATCH TOPICAL at 04:05

## 2024-12-24 RX ADMIN — FENTANYL CITRATE 50 MCG: 50 INJECTION INTRAMUSCULAR; INTRAVENOUS at 19:12

## 2024-12-24 RX ADMIN — SODIUM CHLORIDE 500 ML: 9 INJECTION, SOLUTION INTRAVENOUS at 12:51

## 2024-12-24 RX ADMIN — MIDAZOLAM 2 MG: 1 INJECTION INTRAMUSCULAR; INTRAVENOUS at 18:54

## 2024-12-24 RX ADMIN — DEXAMETHASONE SODIUM PHOSPHATE 8 MG: 4 INJECTION, SOLUTION INTRA-ARTICULAR; INTRALESIONAL; INTRAMUSCULAR; INTRAVENOUS; SOFT TISSUE at 20:39

## 2024-12-24 RX ADMIN — METHOCARBAMOL TABLETS 500 MG: 500 TABLET, COATED ORAL at 08:07

## 2024-12-24 RX ADMIN — OXYCODONE HYDROCHLORIDE 10 MG: 5 TABLET ORAL at 08:07

## 2024-12-24 RX ADMIN — PIPERACILLIN SODIUM AND TAZOBACTAM SODIUM 3.38 G: 3; .375 INJECTION, SOLUTION INTRAVENOUS at 20:08

## 2024-12-24 RX ADMIN — IOPAMIDOL 97 ML: 755 INJECTION, SOLUTION INTRAVENOUS at 16:16

## 2024-12-24 RX ADMIN — NALOXONE HYDROCHLORIDE 0.2 MG: 0.4 INJECTION, SOLUTION INTRAMUSCULAR; INTRAVENOUS; SUBCUTANEOUS at 15:16

## 2024-12-24 RX ADMIN — FENTANYL CITRATE 100 MCG: 50 INJECTION INTRAMUSCULAR; INTRAVENOUS at 21:00

## 2024-12-24 RX ADMIN — Medication 200 MG: at 21:56

## 2024-12-24 RX ADMIN — NOREPINEPHRINE BITARTRATE 6.4 MCG: 1 INJECTION, SOLUTION, CONCENTRATE INTRAVENOUS at 19:09

## 2024-12-24 ASSESSMENT — ACTIVITIES OF DAILY LIVING (ADL)
ADLS_ACUITY_SCORE: 36
ADLS_ACUITY_SCORE: 33
ADLS_ACUITY_SCORE: 36

## 2024-12-24 ASSESSMENT — ENCOUNTER SYMPTOMS: SEIZURES: 1

## 2024-12-24 NOTE — CODE/RAPID RESPONSE
12/23/24 1808   Call Information   Date of Call 12/23/24   Time of Call 1808   Name of person requesting the team VIKI Elizabeth RN   Title of person requesting team RN   RRT Arrival time 1810   Time RRT ended 1835   Reason for call   Type of RRT Adult   Primary reason for call Neurological;Respiratory;Nurse is concerned/worried   Respiratory Respiratory pattern change   Neurological Lethargic   Was patient transferred from the ED, ICU, or PACU within last 24 hours prior to RRT call? No   SBAR   Situation RRT called s/t increased lethargy and breathing pattern change. Pt arouding to vigours stimulation or sternal rub, but cannot stay awake for an entire sentence. VSS on RA; HR is elevated but this is baseline per primary provider and bedside RN.   Background Has history of recurrent SBOs , most recent on 12/17-12/19 managed non surgically with NG tube LIS. Presented with abdominal pain in LLQ radiating with mild nausea . No vomiting . Got discharged yesterday ate a sandwich . This morning after eating some chicken   developed the above symptoms. Last BM yesterday night. Coming in , afebrile, hemodynamically stable , CT Abd pelvis W contrast showed small bowel obstruction with a dilated bowel  loop measuring up to 4.7 cm with suspicion of developing closed loop obstruction -Pt went to OR for exploratory laparotomy.   Notable History/Conditions Diabetes;Hypertension;Recent surgery   Assessment Pt appears lethargic, resting with eyes closed in bed. Arouses to vigorous stimulation, but not to voice. PERRLA noted; nop facial droop,  and foot pumps are 5+ and equal bilaterally. VSS with HR in the 120s, this is baseline per bedside RN and primary provider. BP is stable and O2 sats are 98 on RA.   Interventions CXR;Labs;Other (describe);Blood glucose  (ETCO2; holding narcotics)   Adjustments to Recommend Decrease in opioid dosage or frequency. No indication to Narcan at this time, but hold PCA and PRN opioids until patient  is more arousable. ETCO2 monitoring.   Patient Outcome   Patient Outcome Stabilized on unit   RRT Team   Attending/Primary/Covering Physician Christiano Horn MD   Date Attending Physician notified 12/23/24   Time Attending Physician notified 1820   Physician(s) Primary team at bedside   Lead RN Anita RN   RN Dali RN   RT Zain RT   Interventions   ETCO2 Applied? yes

## 2024-12-24 NOTE — PROGRESS NOTES
Brief Surgery Note    CT with findings of free fluid and air tracking from area of anastomosis, proximal loop of small bowel dilated to over 5 cm. Findings concerning for anastomotic leak. Patient febrile and altered consistent with sepsis. Will plan for urgent return to the OR for ex lap, washout and likely small bowel resection. No family or contacts available for patient. Able to discuss with him, agrees to surgery, but unclear if truly understanding of situation due to altered status. Will proceed to OR given emergent nature. Discussed with staff, Dr. Humble Duarte MD  PGY-5 General Surgery

## 2024-12-24 NOTE — PROVIDER NOTIFICATION
Notified dr. Jesse rivers in and awaiting xray. Continue pca and inulin once iv in? Awaiting for answers.

## 2024-12-24 NOTE — PROGRESS NOTES
[facility]    Medicine Progress Note - Hospitalist Service, GOLD TEAM 7    Date of Admission:  12/20/2024    Assessment & Plan   Andrew Collier is a 59 year old male admitted on 12/20/2024. He has a history of HIV on Biktarvy, DM2 , methamphetamine use , multiple admissions with SBO of unknown cause ( most recent on 12/17-12/19) managed non surgically with NG tube LIS admitted with SBO s/p small bowel resection.    Internal medicine consulted for medical co-management.    #SBO s/p small bowel resection  Has history of recurrent SBOs , most recent on 12/17-12/19 managed non surgically with NG tube LIS. Presented with abdominal pain in LLQ radiating with mild nausea . No vomiting . Got discharged yesterday ate a sandwich . This morning after eating some chicken   developed the above symptoms. Last BM yesterday night. Coming in , afebrile, hemodynamically stable , WBC 7.5 , lipase 57 , LFTs WNL, UA shows no evidence of infection. Abdominal XR shows  no definite pneumoperitoneum. Mild to moderate colonic stool burden. CT Abd pelvis W contrast shows small bowel obstruction with a dilated bowel  loop measuring up to 4.7 cm with suspicion of developing closed loop obstruction , similar to admission before.   - Surgery primary, ongoing management per surgery  - Post operative pain management per surgery team, limit sedating meds as able with new encephalopathy    # Severe sepsis  3/4 SIRS criteria met (WBC, fever, HR). Febrile in PM on 12/24.  - Agree with additional IVF. Will follow pending lactate  - Infectious workup with blood and urine cultures  - Consider advanced imaging in setting of encephalopathy with tenderness of abdomen on palpation and unclear source of infection as well as recent procedure  - Consider empiric antibiotics with coverage of hospital-acquired organisms (potentially vanc/cef/flagyl with new JULIANNE)    # JULIANNE  # NAGMA  - Hold toradol (ordered for  you) and avoid additional nephrotoxic agents  - Agree with additional IVF  - UA, Strict I/Os, bladder scan (ordered for you)  - Agree with repeat BMP this PM    # Encephalopathy  Suspected septic vs metabolic vs toxic.  - CT head  - Infectious workup as above  - Agree with limiting sedating medications as able    #Sinus tachycardia   - Further management per primary  - Agree with cardiac monitoring for now  - EKG ordered    #Shortness of breath  - Agree with CXR  - IS  - Consider decreasing vs discontinuing IVF    #Diabetes mellitus type 2   #Hyperglycemia   Coming in , BG elevated to 392. Per patient, he takes his home insulin regularly which is 27U glargine BID and aspart 10U TID with meals . Last insulin he took was glargine this morning and aspart with the meals.  - Lantus 10 units daily  - Start insulin gtt    # Chronic conditions without changes  # HIV: Continue PTA Biktarvy  # History of asthma: Albuterol inhaler as needed    Discussed with surgery service via secure messaging and the above note. Urgent recommendations including need for infectious workup, empiric abx, and CT AP discussed with surgery resident. Appreciate their management.          Diet: NPO for Medical/Clinical Reasons Except for: Meds, Ice Chips    DVT Prophylaxis: Defer to primary service  Potts Catheter: Not present  Lines: None     Cardiac Monitoring: ACTIVE order. Indication: Tachyarrhythmias, acute (48 hours)  Code Status: Full Code      Clinically Significant Risk Factors          # Hyperchloremia: Highest Cl = 112 mmol/L in last 2 days, will monitor as appropriate          # Hypoalbuminemia: Lowest albumin = 3.4 g/dL at 12/21/2024  9:21 AM, will monitor as appropriate    # Acute Kidney Injury, unspecified: based on a >150% or 0.3 mg/dL increase in last creatinine compared to past 90 day average, will monitor renal function            # DMII: A1C = 10.5 % (Ref range: <5.7 %) within past 6 months         # Financial/Environmental  Concerns: unemployed  # Support System: poor social support noted in nursing assessment  # Housing Instability: noted in nursing assessment         Social Drivers of Health    Food Insecurity: Low Risk  (12/21/2024)    Food Insecurity     Within the past 12 months, did you worry that your food would run out before you got money to buy more?: No     Within the past 12 months, did the food you bought just not last and you didn t have money to get more?: No   Recent Concern: Food Insecurity - High Risk (10/24/2024)    Food Insecurity     Within the past 12 months, did you worry that your food would run out before you got money to buy more?: Yes     Within the past 12 months, did the food you bought just not last and you didn t have money to get more?: Yes   Housing Stability: High Risk (12/21/2024)    Housing Stability     Do you have housing? : No     Are you worried about losing your housing?: Yes   Tobacco Use: High Risk (12/12/2024)    Patient History     Smoking Tobacco Use: Some Days     Smokeless Tobacco Use: Former   Financial Resource Strain: High Risk (12/21/2024)    Financial Resource Strain     Within the past 12 months, have you or your family members you live with been unable to get utilities (heat, electricity) when it was really needed?: Yes   Alcohol Use: Unknown (8/27/2020)    AUDIT-C     Average Number of Drinks: Patient declined   Transportation Needs: High Risk (12/21/2024)    Transportation Needs     Within the past 12 months, has lack of transportation kept you from medical appointments, getting your medicines, non-medical meetings or appointments, work, or from getting things that you need?: Yes   Interpersonal Safety: Low Risk  (12/21/2024)    Interpersonal Safety     Do you feel physically and emotionally safe where you currently live?: Yes     Within the past 12 months, have you been hit, slapped, kicked or otherwise physically hurt by someone?: No     Within the past 12 months, have you been  humiliated or emotionally abused in other ways by your partner or ex-partner?: No   Recent Concern: Interpersonal Safety - High Risk (10/24/2024)    Interpersonal Safety     Do you feel physically and emotionally safe where you currently live?: No     Within the past 12 months, have you been hit, slapped, kicked or otherwise physically hurt by someone?: No     Within the past 12 months, have you been humiliated or emotionally abused in other ways by your partner or ex-partner?: No    Received from [employer] [facility], [employer] [facility]    Social Connections          Disposition Plan     Medically Ready for Discharge: Anticipated in 5+ Days             Eloina Jama MD  Hospitalist Service, [address]  Securely message with [employer] (more info)  Text page via [facility] Paging/Directory   See signed in provider for up to date coverage information  ______________________________________________________________________    Interval History   Somnolent this morning and not interactive on my interview. Per nursing and prior provider, patient does sometimes decline to interact but was much more somnolent and not engaging with interview. ROS limited.    Physical Exam   Vital Signs: Temp: (!) 101.1  F (38.4  C) Temp src: Axillary BP: 131/72 Pulse: (!) (P) 140   Resp: (P) 20 SpO2: 98 % O2 Device: Nasal cannula Oxygen Delivery: 2 LPM  Weight: 0 lbs 0 oz    Physical Exam  Constitutional:       Appearance: He is ill-appearing.   HENT:      Head: Normocephalic and atraumatic.   Cardiovascular:      Rate and Rhythm: Regular rhythm. Tachycardia present.   Pulmonary:      Breath sounds: No wheezing, rhonchi or rales.      Comments: Borderline tachypneic with mildly increased WOB, no clear accessory muscle use  Abdominal:      General: There is distension.      Tenderness: There is abdominal tenderness.      Comments:  TTP at left abdomen   Musculoskeletal:      Right lower leg: No edema.      Left lower leg: No edema.   Skin:     General: Skin is warm and dry.           Medical Decision Making       64 MINUTES SPENT BY ME on the date of service doing chart review, history, exam, documentation & further activities per the note.      Data     I have personally reviewed the following data over the past 24 hrs:    2.4 (L); 2.4 (L)  \   12.4 (L)   / 241     143 112 (H) 48.2 (H) /  202 (H)   3.8 16 (L) 1.83 (H) \     Procal: N/A CRP: N/A Lactic Acid: 1.7         Imaging results reviewed over the past 24 hrs:   Recent Results (from the past 24 hours)   XR Chest Port 1 View    Narrative    Portable chest    INDICATION: Shortness of breath    COMPARISON: Yesterday    FINDINGS: Right hemidiaphragm elevation persists with underlying low  inspiratory volumes reported taking again note. Heart size upper  normal. No pneumothorax. Linear densities in the lower lungs similar  to previous.      Impression    IMPRESSION: No significant changes from yesterday.    MATIAS LIZ MD         SYSTEM ID:  [id number]   XR Abdomen Port 1 View    Narrative    EXAMINATION: XR ABDOMEN PORT 1 VIEW  12/24/2024 9:13 AM     COMPARISON: Radiograph 12/23/2024. CT 12/20/2024.    HISTORY: SOB, concern ileus.    TECHNIQUE: Frontal view of the abdomen.    FINDINGS: Midline staple line. Similar mildly dilated loops of small  bowel projecting over the bilateral lower quadrants measuring up to  3.9 cm. No definite pneumatosis or portal venous gas.       Impression    IMPRESSION:   Mildly dilated loops of small bowel projecting over the bilateral  lower quadrants, may represent postoperative ileus although  obstruction cannot be excluded.    I have personally reviewed the examination and initial interpretation  and I agree with the findings.    BORIS PAYTON MD         SYSTEM ID:  [id number]   XR Abdomen Port 1 View    Narrative    Exam: XR ABDOMEN PORT 1 VIEW,  12/24/2024 1:27 PM    Indication: Confirm NGT position    Comparison: Abdominal radiograph 12/24/2024 at 08:26    Findings:   Portable supine AP radiographs of the abdomen. Orogastric tube tip and  sidehole projected within the stomach. Similar appearance of mildly  dilated loops of small bowel. No pneumatosis or portal venous gas.  Moderate stool burden. Subsequent closure staples overlying the mid  abdomen upper pelvis. No acute osseous abnormality. Lung bases are not  well-visualized.      Impression    Impression:   1. Orogastric tube tip and sidehole projected over the stomach. The  sidehole is near the GE junction, consider advancing 2-3 cm.  2. Persistent dilation of small bowel loops within the abdomen.  Findings likely represent postoperative ileus though obstruction  cannot be entirely excluded.     I have personally reviewed the examination and initial interpretation  and I agree with the findings.    ALFREDA SHIPLEY MD         SYSTEM ID:  [id number]

## 2024-12-24 NOTE — PROGRESS NOTES
Patient continues to refuse best practice care after education provided to patient    Care refused: Pt removed NG tube on own, removed telemetry monitoring, removed pulse oximetry, and removed capnography. Writer continued to apply telemetry, capnography, and pulse oximetry. Pt refused and removed numerous times.  Education Provided: Educated patient on importance of every object that was pulled. Pt did not show understanding and continued to pull lines.   Provider notified (name): Franklin Rodas MD. MD came to bedside numerous times in attempt to educate pt on importance as well. MD instructed writer that it is ok to leave lines unhooked from patient as pt continues to remove them.     Bela Bills RN

## 2024-12-24 NOTE — PROGRESS NOTES
Surgery Progress Note  12/24/2024       Subjective:  Rapid called for lethargy likely due to increased dilaudid PCA dose, no narcan given as he was protecting his airway. Overnight, patient tachycardic, wanted to leave AMA, pulled out his NGT and IV. This AM, he is sleepy through questioning. He endorses bloating and right abdominal pain. Denies nausea, vomiting and SOB. He is not passing flatus.      Objective:  Temp:  [97.9  F (36.6  C)-98.3  F (36.8  C)] 97.9  F (36.6  C)  Pulse:  [121-135] 121  Resp:  [16-20] 17  BP: (121-141)/() 141/86  SpO2:  [91 %-100 %] 93 %    I/O last 3 completed shifts:  In: 20 [I.V.:20]  Out: 1800 [Urine:750; Emesis/NG output:1050]      Gen: Awake, alert, NAD  CV: Sinus tachycardia  Resp: Labored breathing, on 2L NC  Abd: soft, mildly distended, appropriately tender on palpation.   Incision: c/d/I  Ext: WWP, no edema     Labs:  Recent Labs   Lab 12/24/24  0704 12/23/24  0703 12/22/24  0624   WBC 2.4* 4.5 7.9   HGB 12.4* 13.0* 13.5    271 260       Recent Labs   Lab 12/24/24  0704 12/24/24  0631 12/24/24  0436 12/23/24  0749 12/23/24  0703 12/22/24  0831 12/22/24  0624     --   --   --  140  --  136   POTASSIUM 3.8  --   --   --  4.3  --  4.1   CHLORIDE 112*  --   --   --  108*  --  105   CO2 16*  --   --   --  19*  --  17*   BUN 48.2*  --   --   --  28.9*  --  17.7   CR 1.83*  --   --   --  1.04  --  0.70   * 143* 127*   < > 273*   < > 237*   LIDNA 8.9  --   --   --  9.0  --  8.8    < > = values in this interval not displayed.       Imaging:  XR ABDOMEN PORT 1 VIEW 12/24/2024  IMPRESSION:   Mildly dilated loops of small bowel projecting over the bilateral  lower quadrants, may represent postoperative ileus although  obstruction cannot be excluded.      CXR 12/24/24  COMPARISON: Yesterday  FINDINGS: Right hemidiaphragm elevation persists with underlying low  inspiratory volumes reported taking again note. Heart size upper  normal. No pneumothorax. Linear densities  in the lower lungs similar  to previous.       CT abdomen pelvis 12/20/2024  IMPRESSION:   Dilated loop of small bowel measuring up to 4.7 cm in the central  abdomen compatible with bowel obstruction; with 2 apparent transition  points, developing closed-loop obstruction not entirely excluded. No  pneumatosis, mesenteric venous gas or portal venous gas.     Assessment/Plan:   59 year old male with history significant for recurrent SBO, poorly controlled T2DM, HIV on ART, and recent admission for SBO managed conservatively with decompression and gastrograffin challenge presenting to the ED with recurrent abdominal pain. Review of CT imaging with isolated segment of dilated bowel concerning for closed loop obstruction. In setting of multiple previous admissions for obstruction, exploratory surgery was offered s/p ex lap with SBR on 12/20/24.     Abdominal pain is stable. Will hold PCA today in setting of lethargy and labored breathing.  Abdominal x-ray with post-op ileus vs obstruction. Will replace NGT today. Encourage IS.      Plan:  - Hold PCA   - Continue MMPC: toradol/robaxin/ PRN oxycodone  - NGT   - NPO, ok for ice chips  - MIVF: NS 125ml/hr. Will give 500ml bolus  - AROBF  -Simethicone for gas  -Lovenox  -Appreciate medicine comanagement      Seen, examined, and discussed with chief resident, who will discuss with staff.  - - - - - - - - - - - - - - - - - -    Av Cormier MD  Urology, PGY-1 on EGS Service

## 2024-12-24 NOTE — PROGRESS NOTES
RRT called for increased lethargy. Patient arousing to voice and following commands, but quickly to go back to sleeping when not stimulated. Protecting airway, saturating well on room air, and end tidal capnography in low to mid-30s. PCA dose increased to 0.3mg Dilaudid q10 minutes today due to concerns for sub-optimal pain control.     Temp: 98.1  F (36.7  C) Temp src: Oral BP: 138/81 Pulse: 116   Resp: 20 SpO2: 91 %      O2 Device: Nasal cannula     General: NAD, intermittently awake, resting in bed  CV: tachycardic to 130s on monitor  Pulm: non-labored breathing on room air  Abd: soft, tammy-incisional tenderness, non-distended  Incision clean, dry, and intact  Neuro: following commands when stimulated, moving all extremities    A/P: 59-year-old male with history of recurrent SBO, poorly controlled T2DM, HIV on ART, methamphetamine abuse, now s/p exploratory laparotomy with small bowel resection. Since surgery, pain control has been an issue. Despite multi-modal management, patient required continued increase in dilaudid PCA doses, most recently today. Presentation consistent with narcotic sedation. As patient is protecting their airway and remains adequately oxygenated on room air, no Narcan was administered. Sedating medications including opioids and Robaxin were held pending increased alertness. Plan to decrease dilaudid PCA dosing to 0.2mg q10 minutes once patient is more alert.    Patient discussed with chief resident, Dr. Valentine.    Franklin Rodas MD, PGY-1  General Surgery Resident

## 2024-12-24 NOTE — ANESTHESIA PREPROCEDURE EVALUATION
Anesthesia Pre-Procedure Evaluation    Patient: Andrew Collier   MRN: [id number] : 1965        Procedure : Procedure(s):  Laparotomy exploratory, possible bowel resection          Past Medical History:   Diagnosis Date    Acute appendicitis with localized peritonitis 2018    AIDS (H)     Allergic rhinitis due to other allergen     DNS    Anal dysplasia     Chronic abdominal pain     CNS toxoplasmosis (H)     COVID-19 2022    Diabetes type 2, controlled (H)     GERD (gastroesophageal reflux disease)     Herpes zoster 2016    History of seizure     History of substance abuse (H)     HIV (human immunodeficiency virus infection) (H)     HLD (hyperlipidemia)     Lung nodules     Periungual wart     Pneumonia 2019    PTSD (post-traumatic stress disorder)     Sleep apnea     doesn't use CPAP      Past Surgical History:   Procedure Laterality Date    ANOSCOPY N/A 2020    Procedure: Exam Under Anesthesia, ANOSCOPY, fulgeration of rectal fissures with Rectal Biopsies;  Surgeon: Thanh Lundberg MD;  Location: [facility]    COLONOSCOPY Left 2016    Procedure: COMBINED COLONOSCOPY, SINGLE OR MULTIPLE BIOPSY/POLYPECTOMY BY BIOPSY;  Surgeon: Clark Saini MD;  Location: [facility]    ESOPHAGOSCOPY, GASTROSCOPY, DUODENOSCOPY (EGD), COMBINED N/A 2022    Procedure: ESOPHAGOGASTRODUODENOSCOPY, WITH BIOPSY;  Surgeon: Kecia Benítez MD;  Location: [facility]    ESOPHAGOSCOPY, GASTROSCOPY, DUODENOSCOPY (EGD), COMBINED N/A 10/10/2024    Procedure: Esophagoscopy, gastroscopy, duodenoscopy (EGD), combined;  Surgeon: Nico Bhatti DO;  Location: [facility]    HC EXPLORE UNDESC TESTIS,INGUIN/SCROTAL      LAPAROSCOPIC APPENDECTOMY N/A 2018    Procedure: LAPAROSCOPIC APPENDECTOMY;  LAPAROSCOPIC APPENDECTOMY;  Surgeon: Dawn Holt MD;  [facility]    LAPAROSCOPY DIAGNOSTIC (GENERAL) N/A 2016    Procedure: LAPAROSCOPY DIAGNOSTIC (GENERAL);  Surgeon: Susannah Arriaga,  "MD;  [facility]    LAPAROSCOPY DIAGNOSTIC (GENERAL) N/A 4/16/2018    Procedure: LAPAROSCOPY DIAGNOSTIC (GENERAL);  Diagnostic laparoscopy and lysis of adhesions;  Surgeon: Prince Dowling MD;  [facility]: [facility]    OPTICAL TRACKING SYSTEM CRANIOTOMY, EXCISE TUMOR, COMBINED Left 4/10/2015    Procedure: COMBINED OPTICAL TRACKING SYSTEM CRANIOTOMY, EXCISE TUMOR;  Surgeon: Mirlande Colmenares MD;  [facility]: [facility]    REPAIR GAMEKEEPER'S THUMB Right 12/2/2016    Procedure: REPAIR LIGAMENT ULNAR COLLATERAL THUMB (GAMEKEEPER'S);  Surgeon: vEin Zamorano MD;  [facility]    RESECT SMALL BOWEL WITHOUT OSTOMY N/A 12/20/2024    Procedure: Laparotomy exploratory with small bowel resection.;  Surgeon: Christiano Obrien MD;  Location: [facility]    ZZC NONSPECIFIC PROCEDURE      right forearm fracture      Allergies   Allergen Reactions    Fentanyl Blisters     Per pt, after taking this medication had blisters develope    Tylenol [Acetaminophen] Itching    Dulaglutide Rash    Insulin Lispro Rash     Patient reported    Penicillin V Other (See Comments) and Rash     Diffuse maculopapular rash + feels \"high\", per pt.     Profile shows many uses of Zosyn       Social History     Tobacco Use    Smoking status: Some Days     Current packs/day: 0.25     Average packs/day: 0.3 packs/day for 40.0 years (10.0 ttl pk-yrs)     Types: Cigarettes    Smokeless tobacco: Former   Substance Use Topics    Alcohol use: No     Alcohol/week: 0.0 standard drinks of alcohol     Comment: Last etoh in 2007      Wt Readings from Last 1 Encounters:   12/12/24 72.1 kg (159 lb)        Anesthesia Evaluation   Pt has had prior anesthetic. Type: General and MAC.    No history of anesthetic complications       ROS/MED HX  ENT/Pulmonary:     (+) sleep apnea, doesn't use CPAP,         allergic rhinitis,                   recent URI, unresolved,         Neurologic:     (+)       seizures,                         Cardiovascular:     (+) " Dyslipidemia - -   -  - -                                 Previous cardiac testing   Echo: Date: Results:    Stress Test:  Date: Results:    ECG Reviewed:  Date: 12/24/24 Results:  Sinus tachycardia   Otherwise normal ECG   When compared with ECG of 21-Dec-2024 08:34,   No significant change was found     Cath:  Date: Results:      METS/Exercise Tolerance:     Hematologic:       Musculoskeletal:       GI/Hepatic: Comment: Bowel obstruction, with free fluid and free air found on CT scan tonight    Per report, patient has been agitated, not following commands and pulled out NGT during CT scan.     IMPRESSION:  1. New small to moderate volume of free fluid with free air in the  abdomen concerning for bowel perforation in the setting of a short  segment of small bowel dilation up to 5.1 cm near the surgical  anastomosis.  2. Multifocal groundglass opacities in the lungs worrisome for  infection. Additionally, there is a small to moderate right pleural  effusion with significant compressive atelectasis of the right lower  lobe, and a small consolidated density in the left lower lobe.      (+) GERD,                   Renal/Genitourinary:       Endo:     (+)  type II DM,                    Psychiatric/Substance Use:     (+) psychiatric history   H/O chronic opiod use . Recreational drug usage: Meth.    Infectious Disease: Comment: HIV      (+) Recent Fever,  MRSA,         Malignancy:       Other:            Physical Exam    Airway   unable to assess   Comment: Not following commands     TM distance: > 3 FB   Neck ROM: full   Mouth opening: < 3 cm    Respiratory Devices and Support         Dental    unable to assess        Cardiovascular          Rhythm and rate: regular and tachycardia     Pulmonary           (+) decreased breath sounds (left side)   (-) no rhonchi        OUTSIDE LABS:  CBC:   Lab Results   Component Value Date    WBC 2.4 (L) 12/24/2024    WBC 2.4 (L) 12/24/2024    HGB 12.4 (L) 12/24/2024    HGB 13.0 (L)  12/23/2024    HCT 37.1 (L) 12/24/2024    HCT 40.4 12/23/2024     12/24/2024     12/23/2024     BMP:   Lab Results   Component Value Date     12/24/2024     12/23/2024    POTASSIUM 3.8 12/24/2024    POTASSIUM 4.3 12/23/2024    CHLORIDE 112 (H) 12/24/2024    CHLORIDE 108 (H) 12/23/2024    CO2 16 (L) 12/24/2024    CO2 19 (L) 12/23/2024    BUN 48.2 (H) 12/24/2024    BUN 28.9 (H) 12/23/2024    CR 1.83 (H) 12/24/2024    CR 1.04 12/23/2024     (H) 12/24/2024     (H) 12/24/2024     COAGS:   Lab Results   Component Value Date    PTT 28 12/20/2024    INR 0.97 12/20/2024    FIBR 371 10/10/2024     POC:   Lab Results   Component Value Date     (H) 03/10/2021     HEPATIC:   Lab Results   Component Value Date    ALBUMIN 3.4 (L) 12/21/2024    PROTTOTAL 6.3 (L) 12/21/2024    ALT <5 12/21/2024    AST 18 12/21/2024    ALKPHOS 72 12/21/2024    BILITOTAL 1.3 (H) 12/21/2024     OTHER:   Lab Results   Component Value Date    PH 7.39 12/23/2022    LACT 1.7 12/24/2024    A1C 10.5 (H) 10/23/2024    LINDA 8.9 12/24/2024    PHOS 2.8 11/21/2024    MAG 4.3 (H) 11/21/2024    LIPASE 57 12/20/2024    AMYLASE 50 08/24/2021    TSH 0.87 09/25/2020    T4 1.02 04/14/2015    CRP 5.6 02/19/2022    SED 10 12/19/2020       Anesthesia Plan    ASA Status:  4, emergent    NPO Status:  ELEVATED Aspiration Risk/Unknown    Anesthesia Type: General.     - Airway: ETT   Induction: Intravenous, RSI, Propofol.   Maintenance: Balanced.   Techniques and Equipment:     - Airway: Video-Laryngoscope     - Lines/Monitors: BIS, Arterial Line, 2nd IV, Central Line     - Blood: T&S     - Drips/Meds: Norepi (insulin)     Consents    Anesthesia Plan(s) and associated risks, benefits, and realistic alternatives discussed. Questions answered and patient/representative(s) expressed understanding.     - Discussed:     - Discussed with:  Implied consent/emergency       - Patient is DNR/DNI Status: No     Use of blood products discussed:  Yes.     - Discussed with: Implied consent/Emergency.     Postoperative Care    Pain management: Multi-modal analgesia, IV analgesics.   PONV prophylaxis: Ondansetron (or other 5HT-3), Dexamethasone or Solumedrol     Comments:    Other Comments: 58 yo male PMHx several SBOs and recent bowel resection, poorly controlled T2DM, HIV on ART, and now with free air and fluid on CT imaging and meeting criteria for sepsis. Plan for repeat ex lap, possible bowel resection. Per report, patient had pulled out NGT earlier today with altered mental status. Plan GETA (RSI), standard ASA monitors, 2nd PIV, arterial catheter, central venous catheter and BIS. Patient with continued altered mental status, follows some commands, sometimes will shake head yes or no for questions, implied/emergent consent. No alternate contact available.           Sommer Larios MD    I have reviewed the pertinent notes and labs in the chart from the past 30 days and (re)examined the patient.  Any updates or changes from those notes are reflected in this note.      # Hyperchloremia: Highest Cl = 112 mmol/L in last 2 days, will monitor as appropriate          # Hypoalbuminemia: Lowest albumin = 3.4 g/dL at 12/21/2024  9:21 AM, will monitor as appropriate    # Acute Kidney Injury, unspecified: based on a >150% or 0.3 mg/dL increase in last creatinine compared to past 90 day average, will monitor renal function            # DMII: A1C = 10.5 % (Ref range: <5.7 %) within past 6 months         # Financial/Environmental Concerns: unemployed  # Support System: poor social support noted in nursing assessment  # Housing Instability: noted in nursing assessment

## 2024-12-24 NOTE — PROGRESS NOTES
[facility]    Medicine Consult Progress Note - Hospitalist Service, GOLD TEAM 7    Date of Admission:  12/20/2024    Assessment & Plan   Andrew Collier is a 59 year old male admitted on 12/20/2024. He has a history of HIV on Biktarvy , DM2 , methamphetamine use , multiple admissions with SBO of unknown cause ( most recent on 12/17-12/19) managed non surgically with NG tube LIS  , presented with complaints of abdominal pain , found to have SBO now s/p small bowel resection.    Internal medicine consulted for medical co-management.    #SBO s/p small bowel resection  Has history of recurrent SBOs , most recent on 12/17-12/19 managed non surgically with NG tube LIS. Presented with abdominal pain in LLQ radiating with mild nausea . No vomiting . Got discharged yesterday ate a sandwich . This morning after eating some chicken   developed the above symptoms. Last BM yesterday night. Coming in , afebrile, hemodynamically stable , WBC 7.5 , lipase 57 , LFTs WNL, UA shows no evidence of infection. Abdominal XR shows  no definite pneumoperitoneum. Mild to moderate colonic stool burden. CT Abd pelvis W contrast shows small bowel obstruction with a dilated bowel  loop measuring up to 4.7 cm with suspicion of developing closed loop obstruction , similar to admission before.   - Surgery primary, ongoing management per surgery  - Post operative pain management per surgery team    #Sinus tachycardia  - Further management per primary  - Agree with cardiac monitoring for now    #Shortness of breath  - Agree with CXR  - IS  - Consider decreasing vs discontinuing IVF    #Diabetes mellitus type 2   #Hyperglycemia   Coming in , BG elevated to 392. Per patient, he takes his home insulin regularly which is 27U glargine BID and aspart 10U TID with meals . Last insulin he took was glargine this morning and aspart with the meals.  - Lantus 10 units daily  - Start insulin gtt    # Chronic  conditions without changes  # HIV: Continue PTA Biktarvy  # History of asthma: Albuterol inhaler as needed          Diet: NPO for Medical/Clinical Reasons Except for: Meds, Ice Chips    DVT Prophylaxis: Defer to primary service  Potts Catheter: Not present  Lines: None     Cardiac Monitoring: ACTIVE order. Indication: Tachyarrhythmias, acute (48 hours)  Code Status: Full Code      Clinically Significant Risk Factors          # Hyperchloremia: Highest Cl = 108 mmol/L in last 2 days, will monitor as appropriate          # Hypoalbuminemia: Lowest albumin = 3.4 g/dL at 12/21/2024  9:21 AM, will monitor as appropriate               # DMII: A1C = 10.5 % (Ref range: <5.7 %) within past 6 months       # Financial/Environmental Concerns: unemployed  # Support System: poor social support noted in nursing assessment  # Housing Instability: noted in nursing assessment         Social Drivers of Health    Food Insecurity: Low Risk  (12/21/2024)    Food Insecurity     Within the past 12 months, did you worry that your food would run out before you got money to buy more?: No     Within the past 12 months, did the food you bought just not last and you didn t have money to get more?: No   Recent Concern: Food Insecurity - High Risk (10/24/2024)    Food Insecurity     Within the past 12 months, did you worry that your food would run out before you got money to buy more?: Yes     Within the past 12 months, did the food you bought just not last and you didn t have money to get more?: Yes   Housing Stability: High Risk (12/21/2024)    Housing Stability     Do you have housing? : No     Are you worried about losing your housing?: Yes   Tobacco Use: High Risk (12/12/2024)    Patient History     Smoking Tobacco Use: Some Days     Smokeless Tobacco Use: Former   Financial Resource Strain: High Risk (12/21/2024)    Financial Resource Strain     Within the past 12 months, have you or your family members you live with been unable to get  utilities (heat, electricity) when it was really needed?: Yes   Alcohol Use: Unknown (8/27/2020)    AUDIT-C     Average Number of Drinks: Patient declined   Transportation Needs: High Risk (12/21/2024)    Transportation Needs     Within the past 12 months, has lack of transportation kept you from medical appointments, getting your medicines, non-medical meetings or appointments, work, or from getting things that you need?: Yes   Interpersonal Safety: Low Risk  (12/21/2024)    Interpersonal Safety     Do you feel physically and emotionally safe where you currently live?: Yes     Within the past 12 months, have you been hit, slapped, kicked or otherwise physically hurt by someone?: No     Within the past 12 months, have you been humiliated or emotionally abused in other ways by your partner or ex-partner?: No   Recent Concern: Interpersonal Safety - High Risk (10/24/2024)    Interpersonal Safety     Do you feel physically and emotionally safe where you currently live?: No     Within the past 12 months, have you been hit, slapped, kicked or otherwise physically hurt by someone?: No     Within the past 12 months, have you been humiliated or emotionally abused in other ways by your partner or ex-partner?: No    [facility], [facility]    Social Connections          Disposition Plan     Medically Ready for Discharge: defer to primary service    Recommendations were communicated to primary team via text page and this note.            Stephanie Sheehan MD  Hospitalist Service, [facility] TEAM [address] [place] [address]  Securely message with [employer] (more info)  Text page via [facility] Paging/Directory   See signed in provider for up to date coverage information  ______________________________________________________________________    Interval History   Patient reports ongoing pain as well as some shortness of breath.  Per RN, surgery wants to place NGT. He was placed on O2 overnight. RN feels breathing is more labored.     Physical Exam   Vital Signs: Temp: 98.1  F (36.7  C) Temp src: Oral BP: 138/81 Pulse: 116   Resp: 20 SpO2: 91 % O2 Device: Nasal cannula Oxygen Delivery: 2 LPM  Weight: 0 lbs 0 oz  Physical Exam  Constitutional:       Appearance: He is not toxic-appearing.      Comments: Sleeping but woke to voice   HENT:      Head: Normocephalic and atraumatic.      Right Ear: External ear normal.      Left Ear: External ear normal.      Nose: Nose normal.      Mouth/Throat:      Mouth: Mucous membranes are dry.   Cardiovascular:      Rate and Rhythm: Regular rhythm. Tachycardia present.      Heart sounds: Normal heart sounds.   Pulmonary:      Breath sounds: Normal breath sounds. No wheezing.      Comments: Mild increased work of breathing  Abdominal:      Comments: Midline surgical incision intact without drainage or bleeding   Musculoskeletal:         General: Normal range of motion.   Skin:     Findings: No rash.       Medical Decision Making       50 MINUTES SPENT BY ME on the date of service doing chart review, history, exam, documentation & further activities per the note.      Data     I have personally reviewed the following data over the past 24 hrs:    4.5  \   13.0 (L)   / 271     140 108 (H) 28.9 (H) /  135 (H)   4.3 19 (L) 1.04 \       Imaging results reviewed over the past 24 hrs:   Recent Results (from the past 24 hours)   XR Chest Port 1 View    Narrative    Exam: XR CHEST PORT 1 VIEW, 12/23/2024 10:14 AM    Indication: tachycardia    Comparison: Chest radiograph 11/30/2074, additional priors    Findings:   An AP semiupright view of the chest was obtained. Low lung volumes.  The cardiomediastinal silhouette is enlarged. Bilateral blunting of  the costophrenic angles suggestive of small effusions. No  pneumothorax. New elevation of the right hemidiaphragm with bilateral  streaky interstitial opacities right  more apparent than left. No acute  osseous abnormality. Multiple distended loops of large bowel in the  upper abdomen.      Impression    Impression:   1. Multiple distended loops of large bowel in the upper abdomen were  not visualized on most recent CT on 12/20/2024. This may represent  worsening ileus/obstruction in this patient admitted for bowel  obstruction. Consider evaluation of the abdomen with a CT if desired.  2. Low lung volumes with right hemidiaphragm elevation favored to be  atelectasis/decreased inspiratory effort.  3. New cardiomegaly, however an element of this may be related to AP  technique.    I have personally reviewed the examination and initial interpretation  and I agree with the findings.    MATIAS LIZ MD         SYSTEM ID:  [id number]   XR Abdomen Port 1 View    Narrative    EXAMINATION:  XR ABDOMEN PORT 1 VIEW 12/23/2024     COMPARISON: Abdominal radiograph 12/20/2024, CT abdomen 12/20/2024.    HISTORY: NGT placement    TECHNIQUE: Frontal supine view of the abdomen.    FINDINGS: Gastric tube tip and side-port project over the stomach.  Dilated small bowel measuring 3.7 cm better appreciated on CT. No  abnormally dilated loops of bowel, free air, or pneumatosis in the  visualized abdomen. No portal venous gas.  Reticular attenuation in  the lungs.      Impression    IMPRESSION:   1. Gastric tube tip and side-port project over the stomach.  2. Dilated loops of small bowel measuring up to 3.7 cm as seen on CT.  3. Pulmonary edema.    I have personally reviewed the examination and initial interpretation  and I agree with the findings.    JANIE VANN MD         SYSTEM ID:  [id number]

## 2024-12-24 NOTE — CODE/RAPID RESPONSE
Rapid Response Team Note    Assessment   A rapid response was called on Andrew Collier due to  somnolence . This presentation is likely due to medication effect - Dilaudid PCA dose was increased this morning due to sub-optimal pain control. RN reported she noticed he seems to be getting more sleepy throughout the day with slightly labored breathing. On RRT arrival, he awakens to loud voice and would follow commands with lots of prompting. Strength 5/5 bilateral upper and lower extremities. No hypoxia and is protecting his airway. His mildly labored breathing improved when he was more awake.    Plan   - Primary General Surgery team at bedside  - VBG ordered  - HOLD PCA, Robaxin, Oxycodone for now  - Capnography and continuous oximetry  - Neuro checks Q4H  - Narcan deferred for the time being since he is protecting his airway, no hypoxia, does awaken with prompting.  - If he does not continue to steadily improve mentation-wise with holding sedating medications, then would consider narcan. If narcan does not provide improvement, then would proceed with CT Head and Chest and obtained repeat labs.  -  The General Surgery primary team was at bedside  -  Disposition: The patient will remain on the current unit. We will continue to monitor this patient closely.  -  Reassessment and plan follow-up will be performed by the primary team    César Keyes PA-C  Rapid Response Team [facility]  Securely message with [employer]     Medical Decision Making       26 MINUTES SPENT BY ME on the date of service doing chart review, history, exam, documentation & further activities per the note.      Hospital Course   Brief Summary of events leading to rapid response:   Called to see patient due to progressive somnolence throughout the day and question of mildly labored breathing.    Physical Exam   Vital Signs: Temp: 98.1  F (36.7  C) Temp src: Oral BP: 138/81 Pulse: 116   Resp: 20 SpO2: 91 % O2 Device: Nasal cannula Oxygen Delivery: 2  LPM  Weight: 0 lbs 0 oz    Exam:   GENERAL: Somnolent. Does awaken to loud voice.  CV: RRR. S1, S2. No murmurs appreciated.   RESPIRATORY: Effort normal on room air. Lungs CTAB with no wheezing, rales, or rhonchi.   GI: Abdomen soft and non distended, bowel sounds present x all 4 quadrants. Expected surgical tenderness noted, no rigidity or guarding. Surgical incision CDI.  NEUROLOGICAL: No gross focal deficits. Follows commands with prompting.  Strength 5/5 in upper and lower extremities.     Significant Results and Procedures   , creatinine 1.04, sodium 140

## 2024-12-24 NOTE — PROGRESS NOTES
Brief Social Work Progress Note    Information Obtained: Reviewed pt's chart and attempted to follow up with him regarding his eventual hospital discharge plan. Spoke with pt's bedside nurse and was updated that pt is too lethargic to talk at this time.     Follow-Up Plan: Social work to follow up with pt when he is less lethargic  ________________    RADHA Aragon, LICSW  7B - covering for 7C   Phone: [phone]

## 2024-12-24 NOTE — PROGRESS NOTES
Patient pulled his ng when going to ct scan notified surgery team and will try to insert another one

## 2024-12-24 NOTE — PLAN OF CARE
Goal Outcome Evaluation:    Plan of Care Reviewed With: patient    Overall Patient Progress: no change  Overall Patient Progress: no change    Outcome Evaluation: Assumed care from [id number]. Pt is tachycardic at baseline, on room air, SBA, NPO. Pt was lethargic and intermittently confused. Pt removed NG tube on his own - MD notified, ordered that it is ok to leave out. Pt removed telemetry, pulse oximetry, and capnography. Writer re-attempted and provided education, pt refused numerous times. Pt removed 3 PIV's on his own. PRN Oxycodone administered for pain with minimal relief. Insulin drip stopped at 0515 per pt refusal, last  mg/dL. Pt has a midline abdominal incision that is intact and open to air. PCA remains paused. Pt in attempts to leave AMA - education provided, MD at bedside numerous times in attempt to redirect pt with success. Awaiting day team to make final decisions.Continue with plan of care.     Bela Bills RN

## 2024-12-24 NOTE — PROVIDER NOTIFICATION
temp is now 101.1 lactic acid is being drawn. has not had any opiates since this am. just urinated all over himself was going to cath   Notified dr. Molina

## 2024-12-24 NOTE — PROGRESS NOTES
Patient has NG placed by nakul ELLSWORTH . Awaiting for abd xray for check then will restart suction. Patient agreed to have iv replaced order is in.

## 2024-12-24 NOTE — PROGRESS NOTES
Emergency General Surgery brief progress note    Notified about fever to 101, tachycardia, lethargy    Assessed pt at bedside. He is very difficult to arouse. He was not able to vocalize anything due to lethargy and increased WOB. Had issues with lethargy overnight and PCA was subsequently held. However, did get oxy 5 mg last night and 10 mg around 0800 this morning. He removed NGT last night, it was replaced this AM     /72   Pulse (!) (P) 140   Temp (!) 101.1  F (38.4  C) (Axillary)   Resp (P) 20   SpO2 98%   Lethargic, minimally aroused with sternal rub. Maybe tracked my finger horizontally but very difficult to tell given his lethargy. Did not track vertically   Was able to stick tongue out a little, did not appreciate any tongue deviations  Unable to squeeze my fingers  Labored breathing but satting well on 2LNC   Abd distended but soft, +TTP in mid and RLQ     Net -2.9L since admission     Lactate 1.7   Cr 1.83 (1.04)   WBC 2.4   Hgb 12.4     CXR- right hemidiaphragm unchanged  AXR- post-op ileus vs obstruction     A/P: POD#4 from ex lap with SBR. Lethargic since last night which is not his baseline. Also febrile and more tachycardic. Clinical picture concerning for septic encephalopathy. New JULIANNE and leukopenia.     - Give 1L bolus   - Narcan, discontinue all narcotics   - VBG, blood cultures, U/A   - STAT CT head non-con, CT C/A/P with contrast   - NGT to [facility]AME[facility]/w chief resident.    Neris Parikh, DO  General Surgery, PGY-2

## 2024-12-25 ENCOUNTER — APPOINTMENT (OUTPATIENT)
Dept: GENERAL RADIOLOGY | Facility: CLINIC | Age: 59
End: 2024-12-25
Payer: COMMERCIAL

## 2024-12-25 LAB
ALLEN'S TEST: ABNORMAL
ANION GAP SERPL CALCULATED.3IONS-SCNC: 10 MMOL/L (ref 7–15)
ANION GAP SERPL CALCULATED.3IONS-SCNC: 12 MMOL/L (ref 7–15)
BASE EXCESS BLDA CALC-SCNC: -2.9 MMOL/L (ref -3–3)
BASE EXCESS BLDA CALC-SCNC: -3.2 MMOL/L (ref -3–3)
BASE EXCESS BLDA CALC-SCNC: -5.5 MMOL/L (ref -3–3)
BUN SERPL-MCNC: 37.6 MG/DL (ref 8–23)
BUN SERPL-MCNC: 43.8 MG/DL (ref 8–23)
CALCIUM SERPL-MCNC: 8.5 MG/DL (ref 8.8–10.4)
CALCIUM SERPL-MCNC: 8.7 MG/DL (ref 8.8–10.4)
CHLORIDE SERPL-SCNC: 116 MMOL/L (ref 98–107)
CHLORIDE SERPL-SCNC: 120 MMOL/L (ref 98–107)
CREAT SERPL-MCNC: 1.22 MG/DL (ref 0.67–1.17)
CREAT SERPL-MCNC: 1.29 MG/DL (ref 0.67–1.17)
EGFRCR SERPLBLD CKD-EPI 2021: 63 ML/MIN/1.73M2
EGFRCR SERPLBLD CKD-EPI 2021: 67 ML/MIN/1.73M2
ENTEROCOCCUS FAECALIS: NOT DETECTED
ENTEROCOCCUS FAECIUM: NOT DETECTED
ERYTHROCYTE [DISTWIDTH] IN BLOOD BY AUTOMATED COUNT: 14.4 % (ref 10–15)
ERYTHROCYTE [DISTWIDTH] IN BLOOD BY AUTOMATED COUNT: 14.5 % (ref 10–15)
GLUCOSE BLDC GLUCOMTR-MCNC: 101 MG/DL (ref 70–99)
GLUCOSE BLDC GLUCOMTR-MCNC: 102 MG/DL (ref 70–99)
GLUCOSE BLDC GLUCOMTR-MCNC: 104 MG/DL (ref 70–99)
GLUCOSE BLDC GLUCOMTR-MCNC: 105 MG/DL (ref 70–99)
GLUCOSE BLDC GLUCOMTR-MCNC: 106 MG/DL (ref 70–99)
GLUCOSE BLDC GLUCOMTR-MCNC: 106 MG/DL (ref 70–99)
GLUCOSE BLDC GLUCOMTR-MCNC: 109 MG/DL (ref 70–99)
GLUCOSE BLDC GLUCOMTR-MCNC: 109 MG/DL (ref 70–99)
GLUCOSE BLDC GLUCOMTR-MCNC: 114 MG/DL (ref 70–99)
GLUCOSE BLDC GLUCOMTR-MCNC: 152 MG/DL (ref 70–99)
GLUCOSE BLDC GLUCOMTR-MCNC: 99 MG/DL (ref 70–99)
GLUCOSE SERPL-MCNC: 200 MG/DL (ref 70–99)
GLUCOSE SERPL-MCNC: 99 MG/DL (ref 70–99)
HCO3 BLD-SCNC: 19 MMOL/L (ref 21–28)
HCO3 BLD-SCNC: 20 MMOL/L (ref 21–28)
HCO3 BLD-SCNC: 20 MMOL/L (ref 21–28)
HCO3 SERPL-SCNC: 17 MMOL/L (ref 22–29)
HCO3 SERPL-SCNC: 18 MMOL/L (ref 22–29)
HCT VFR BLD AUTO: 32.1 % (ref 40–53)
HCT VFR BLD AUTO: 32.8 % (ref 40–53)
HGB BLD-MCNC: 10.9 G/DL (ref 13.3–17.7)
HGB BLD-MCNC: 11 G/DL (ref 13.3–17.7)
LACTATE SERPL-SCNC: 1 MMOL/L (ref 0.7–2)
LACTATE SERPL-SCNC: 1.4 MMOL/L (ref 0.7–2)
LISTERIA SPECIES (DETECTED/NOT DETECTED): NOT DETECTED
MAGNESIUM SERPL-MCNC: 2.2 MG/DL (ref 1.7–2.3)
MCH RBC QN AUTO: 28.5 PG (ref 26.5–33)
MCH RBC QN AUTO: 28.7 PG (ref 26.5–33)
MCHC RBC AUTO-ENTMCNC: 33.5 G/DL (ref 31.5–36.5)
MCHC RBC AUTO-ENTMCNC: 34 G/DL (ref 31.5–36.5)
MCV RBC AUTO: 85 FL (ref 78–100)
MCV RBC AUTO: 85 FL (ref 78–100)
O2/TOTAL GAS SETTING VFR VENT: 40 %
OXYHGB MFR BLDA: 95 % (ref 92–100)
OXYHGB MFR BLDA: 96 % (ref 92–100)
OXYHGB MFR BLDA: 97 % (ref 92–100)
PCO2 BLD: 28 MM HG (ref 35–45)
PCO2 BLD: 30 MM HG (ref 35–45)
PCO2 BLD: 32 MM HG (ref 35–45)
PEEP: 5 CM H2O
PEEP: 5 CM H2O
PH BLD: 7.38 [PH] (ref 7.35–7.45)
PH BLD: 7.44 [PH] (ref 7.35–7.45)
PH BLD: 7.45 [PH] (ref 7.35–7.45)
PHOSPHATE SERPL-MCNC: 1.6 MG/DL (ref 2.5–4.5)
PHOSPHATE SERPL-MCNC: 2.8 MG/DL (ref 2.5–4.5)
PLATELET # BLD AUTO: 245 10E3/UL (ref 150–450)
PLATELET # BLD AUTO: 247 10E3/UL (ref 150–450)
PO2 BLD: 126 MM HG (ref 80–105)
PO2 BLD: 78 MM HG (ref 80–105)
PO2 BLD: 92 MM HG (ref 80–105)
POTASSIUM SERPL-SCNC: 3.3 MMOL/L (ref 3.4–5.3)
POTASSIUM SERPL-SCNC: 3.7 MMOL/L (ref 3.4–5.3)
POTASSIUM SERPL-SCNC: 3.8 MMOL/L (ref 3.4–5.3)
RBC # BLD AUTO: 3.8 10E6/UL (ref 4.4–5.9)
RBC # BLD AUTO: 3.86 10E6/UL (ref 4.4–5.9)
SAO2 % BLDA: 96.2 % (ref 96–97)
SAO2 % BLDA: 97.6 % (ref 96–97)
SAO2 % BLDA: 99.4 % (ref 96–97)
SODIUM SERPL-SCNC: 145 MMOL/L (ref 135–145)
SODIUM SERPL-SCNC: 148 MMOL/L (ref 135–145)
STAPHYLOCOCCUS AUREUS: NOT DETECTED
STAPHYLOCOCCUS EPIDERMIDIS: NOT DETECTED
STAPHYLOCOCCUS LUGDUNENSIS: NOT DETECTED
STAPHYLOCOCCUS SPECIES: NOT DETECTED
STREPTOCOCCUS AGALACTIAE: NOT DETECTED
STREPTOCOCCUS ANGINOSUS GROUP: NOT DETECTED
STREPTOCOCCUS PNEUMONIAE: NOT DETECTED
STREPTOCOCCUS PYOGENES: NOT DETECTED
STREPTOCOCCUS SPECIES: DETECTED
TRIGL SERPL-MCNC: 112 MG/DL
WBC # BLD AUTO: 2.4 10E3/UL (ref 4–11)
WBC # BLD AUTO: 3.9 10E3/UL (ref 4–11)

## 2024-12-25 PROCEDURE — 80048 BASIC METABOLIC PNL TOTAL CA: CPT | Performed by: STUDENT IN AN ORGANIZED HEALTH CARE EDUCATION/TRAINING PROGRAM

## 2024-12-25 PROCEDURE — 82805 BLOOD GASES W/O2 SATURATION: CPT

## 2024-12-25 PROCEDURE — 82565 ASSAY OF CREATININE: CPT | Performed by: STUDENT IN AN ORGANIZED HEALTH CARE EDUCATION/TRAINING PROGRAM

## 2024-12-25 PROCEDURE — 258N000003 HC RX IP 258 OP 636

## 2024-12-25 PROCEDURE — 258N000003 HC RX IP 258 OP 636: Performed by: SURGERY

## 2024-12-25 PROCEDURE — 258N000003 HC RX IP 258 OP 636: Performed by: STUDENT IN AN ORGANIZED HEALTH CARE EDUCATION/TRAINING PROGRAM

## 2024-12-25 PROCEDURE — 3E0436Z INTRODUCTION OF NUTRITIONAL SUBSTANCE INTO CENTRAL VEIN, PERCUTANEOUS APPROACH: ICD-10-PCS | Performed by: SURGERY

## 2024-12-25 PROCEDURE — 999N000127 HC STATISTIC PERIPHERAL IV START W US GUIDANCE

## 2024-12-25 PROCEDURE — 85027 COMPLETE CBC AUTOMATED: CPT | Performed by: STUDENT IN AN ORGANIZED HEALTH CARE EDUCATION/TRAINING PROGRAM

## 2024-12-25 PROCEDURE — 250N000011 HC RX IP 250 OP 636: Performed by: SURGERY

## 2024-12-25 PROCEDURE — 250N000011 HC RX IP 250 OP 636: Performed by: STUDENT IN AN ORGANIZED HEALTH CARE EDUCATION/TRAINING PROGRAM

## 2024-12-25 PROCEDURE — 87901 NFCT AGT GNTYP ALYS HIV1 REV: CPT | Performed by: INTERNAL MEDICINE

## 2024-12-25 PROCEDURE — 71045 X-RAY EXAM CHEST 1 VIEW: CPT | Mod: 26 | Performed by: RADIOLOGY

## 2024-12-25 PROCEDURE — 999N000157 HC STATISTIC RCP TIME EA 10 MIN

## 2024-12-25 PROCEDURE — 83735 ASSAY OF MAGNESIUM: CPT | Performed by: SURGERY

## 2024-12-25 PROCEDURE — 87040 BLOOD CULTURE FOR BACTERIA: CPT | Performed by: SURGERY

## 2024-12-25 PROCEDURE — 250N000013 HC RX MED GY IP 250 OP 250 PS 637: Performed by: STUDENT IN AN ORGANIZED HEALTH CARE EDUCATION/TRAINING PROGRAM

## 2024-12-25 PROCEDURE — 87900 PHENOTYPE INFECT AGENT DRUG: CPT | Performed by: INTERNAL MEDICINE

## 2024-12-25 PROCEDURE — 250N000009 HC RX 250: Performed by: STUDENT IN AN ORGANIZED HEALTH CARE EDUCATION/TRAINING PROGRAM

## 2024-12-25 PROCEDURE — 83605 ASSAY OF LACTIC ACID: CPT | Performed by: STUDENT IN AN ORGANIZED HEALTH CARE EDUCATION/TRAINING PROGRAM

## 2024-12-25 PROCEDURE — 250N000009 HC RX 250: Performed by: SURGERY

## 2024-12-25 PROCEDURE — 84478 ASSAY OF TRIGLYCERIDES: CPT

## 2024-12-25 PROCEDURE — 87904 PHENOTYPE DNA HIV W/CLT ADD: CPT | Performed by: INTERNAL MEDICINE

## 2024-12-25 PROCEDURE — 36415 COLL VENOUS BLD VENIPUNCTURE: CPT | Performed by: SURGERY

## 2024-12-25 PROCEDURE — 99291 CRITICAL CARE FIRST HOUR: CPT | Mod: GC | Performed by: SURGERY

## 2024-12-25 PROCEDURE — 84132 ASSAY OF SERUM POTASSIUM: CPT | Performed by: SURGERY

## 2024-12-25 PROCEDURE — 94003 VENT MGMT INPAT SUBQ DAY: CPT

## 2024-12-25 PROCEDURE — 999N000253 HC STATISTIC WEANING TRIALS

## 2024-12-25 PROCEDURE — 71045 X-RAY EXAM CHEST 1 VIEW: CPT

## 2024-12-25 PROCEDURE — 250N000013 HC RX MED GY IP 250 OP 250 PS 637

## 2024-12-25 PROCEDURE — 200N000002 HC R&B ICU UMMC

## 2024-12-25 PROCEDURE — 99233 SBSQ HOSP IP/OBS HIGH 50: CPT | Performed by: INTERNAL MEDICINE

## 2024-12-25 PROCEDURE — 82805 BLOOD GASES W/O2 SATURATION: CPT | Performed by: SURGERY

## 2024-12-25 PROCEDURE — 87906 NFCT AGT GNTYP ALYS HIV1: CPT | Performed by: INTERNAL MEDICINE

## 2024-12-25 PROCEDURE — 250N000011 HC RX IP 250 OP 636

## 2024-12-25 PROCEDURE — 84100 ASSAY OF PHOSPHORUS: CPT | Performed by: SURGERY

## 2024-12-25 PROCEDURE — B4185 PARENTERAL SOL 10 GM LIPIDS: HCPCS | Performed by: SURGERY

## 2024-12-25 RX ORDER — ALBUTEROL SULFATE 90 UG/1
6 INHALANT RESPIRATORY (INHALATION) EVERY 6 HOURS PRN
Status: ACTIVE | OUTPATIENT
Start: 2024-12-25

## 2024-12-25 RX ORDER — DEXMEDETOMIDINE HYDROCHLORIDE 4 UG/ML
.1-1.2 INJECTION, SOLUTION INTRAVENOUS CONTINUOUS
Status: DISCONTINUED | OUTPATIENT
Start: 2024-12-25 | End: 2024-12-25

## 2024-12-25 RX ORDER — IPRATROPIUM BROMIDE AND ALBUTEROL SULFATE 2.5; .5 MG/3ML; MG/3ML
3 SOLUTION RESPIRATORY (INHALATION) EVERY 4 HOURS PRN
Status: ACTIVE | OUTPATIENT
Start: 2024-12-25

## 2024-12-25 RX ORDER — HYDROMORPHONE HCL IN WATER/PF 6 MG/30 ML
.2-.4 PATIENT CONTROLLED ANALGESIA SYRINGE INTRAVENOUS
Status: DISPENSED | OUTPATIENT
Start: 2024-12-25

## 2024-12-25 RX ORDER — LABETALOL HYDROCHLORIDE 5 MG/ML
10-20 INJECTION, SOLUTION INTRAVENOUS EVERY 10 MIN PRN
Status: DISCONTINUED | OUTPATIENT
Start: 2024-12-25 | End: 2024-12-25

## 2024-12-25 RX ORDER — NOREPINEPHRINE BITARTRATE 0.06 MG/ML
.01-.6 INJECTION, SOLUTION INTRAVENOUS CONTINUOUS
Status: DISCONTINUED | OUTPATIENT
Start: 2024-12-25 | End: 2024-12-26

## 2024-12-25 RX ORDER — HYDRALAZINE HYDROCHLORIDE 20 MG/ML
10-20 INJECTION INTRAMUSCULAR; INTRAVENOUS EVERY 30 MIN PRN
Status: ACTIVE | OUTPATIENT
Start: 2024-12-25

## 2024-12-25 RX ORDER — POTASSIUM CHLORIDE 29.8 MG/ML
20 INJECTION INTRAVENOUS ONCE
Status: COMPLETED | OUTPATIENT
Start: 2024-12-25 | End: 2024-12-25

## 2024-12-25 RX ORDER — MEROPENEM 1 G/1
1 INJECTION, POWDER, FOR SOLUTION INTRAVENOUS EVERY 8 HOURS
Status: DISPENSED | OUTPATIENT
Start: 2024-12-25

## 2024-12-25 RX ORDER — DEXTROSE MONOHYDRATE 100 MG/ML
INJECTION, SOLUTION INTRAVENOUS CONTINUOUS PRN
Status: ACTIVE | OUTPATIENT
Start: 2024-12-25

## 2024-12-25 RX ORDER — DEXMEDETOMIDINE HYDROCHLORIDE 4 UG/ML
.1-1.2 INJECTION, SOLUTION INTRAVENOUS CONTINUOUS
Status: DISPENSED | OUTPATIENT
Start: 2024-12-25

## 2024-12-25 RX ORDER — SODIUM CHLORIDE 9 MG/ML
INJECTION, SOLUTION INTRAVENOUS CONTINUOUS
Status: DISCONTINUED | OUTPATIENT
Start: 2024-12-25 | End: 2024-12-26

## 2024-12-25 RX ORDER — CHLORHEXIDINE GLUCONATE ORAL RINSE 1.2 MG/ML
15 SOLUTION DENTAL EVERY 12 HOURS
Status: DISCONTINUED | OUTPATIENT
Start: 2024-12-25 | End: 2024-12-26

## 2024-12-25 RX ORDER — POTASSIUM CHLORIDE 29.8 MG/ML
20 INJECTION INTRAVENOUS
Status: COMPLETED | OUTPATIENT
Start: 2024-12-25 | End: 2024-12-25

## 2024-12-25 RX ADMIN — MAGNESIUM SULFATE HEPTAHYDRATE: 500 INJECTION, SOLUTION INTRAMUSCULAR; INTRAVENOUS at 21:19

## 2024-12-25 RX ADMIN — SODIUM CHLORIDE: 9 INJECTION, SOLUTION INTRAVENOUS at 20:18

## 2024-12-25 RX ADMIN — SODIUM CHLORIDE 500 ML: 9 INJECTION, SOLUTION INTRAVENOUS at 20:32

## 2024-12-25 RX ADMIN — CHLORHEXIDINE GLUCONATE 15 ML: 1.2 SOLUTION ORAL at 07:32

## 2024-12-25 RX ADMIN — LIDOCAINE 4% 1 PATCH: 40 PATCH TOPICAL at 04:21

## 2024-12-25 RX ADMIN — PIPERACILLIN SODIUM AND TAZOBACTAM SODIUM 3.38 G: 3; .375 INJECTION, SOLUTION INTRAVENOUS at 02:00

## 2024-12-25 RX ADMIN — POTASSIUM CHLORIDE 20 MEQ: 29.8 INJECTION, SOLUTION INTRAVENOUS at 06:05

## 2024-12-25 RX ADMIN — FAMOTIDINE 20 MG: 10 INJECTION, SOLUTION INTRAVENOUS at 20:49

## 2024-12-25 RX ADMIN — NOREPINEPHRINE BITARTRATE 0.03 MCG/KG/MIN: 0.06 INJECTION, SOLUTION INTRAVENOUS at 21:22

## 2024-12-25 RX ADMIN — PROPOFOL 10 MCG/KG/MIN: 10 INJECTION, EMULSION INTRAVENOUS at 05:01

## 2024-12-25 RX ADMIN — HYDROMORPHONE HYDROCHLORIDE 0.4 MG: 0.2 INJECTION, SOLUTION INTRAMUSCULAR; INTRAVENOUS; SUBCUTANEOUS at 07:48

## 2024-12-25 RX ADMIN — POTASSIUM PHOSPHATE, MONOBASIC POTASSIUM PHOSPHATE, DIBASIC 9 MMOL: 236; 224 INJECTION, SOLUTION INTRAVENOUS at 10:43

## 2024-12-25 RX ADMIN — CHLORHEXIDINE GLUCONATE 15 ML: 1.2 SOLUTION ORAL at 20:49

## 2024-12-25 RX ADMIN — MEROPENEM 1 G: 1 INJECTION, POWDER, FOR SOLUTION INTRAVENOUS at 18:03

## 2024-12-25 RX ADMIN — POTASSIUM PHOSPHATE, MONOBASIC POTASSIUM PHOSPHATE, DIBASIC 9 MMOL: 236; 224 INJECTION, SOLUTION INTRAVENOUS at 07:31

## 2024-12-25 RX ADMIN — POTASSIUM CHLORIDE 20 MEQ: 29.8 INJECTION INTRAVENOUS at 13:11

## 2024-12-25 RX ADMIN — SODIUM CHLORIDE: 9 INJECTION, SOLUTION INTRAVENOUS at 00:09

## 2024-12-25 RX ADMIN — OLIVE OIL AND SOYBEAN OIL 250 ML: 16; 4 INJECTION, EMULSION INTRAVENOUS at 21:18

## 2024-12-25 RX ADMIN — POTASSIUM CHLORIDE 20 MEQ: 29.8 INJECTION, SOLUTION INTRAVENOUS at 07:32

## 2024-12-25 RX ADMIN — DEXMEDETOMIDINE HYDROCHLORIDE 0.2 MCG/KG/HR: 400 INJECTION INTRAVENOUS at 10:22

## 2024-12-25 RX ADMIN — PIPERACILLIN SODIUM AND TAZOBACTAM SODIUM 4.5 G: 4; .5 INJECTION, SOLUTION INTRAVENOUS at 13:19

## 2024-12-25 RX ADMIN — ENOXAPARIN SODIUM 40 MG: 40 INJECTION SUBCUTANEOUS at 20:49

## 2024-12-25 RX ADMIN — PIPERACILLIN SODIUM AND TAZOBACTAM SODIUM 3.38 G: 3; .375 INJECTION, SOLUTION INTRAVENOUS at 07:36

## 2024-12-25 ASSESSMENT — ACTIVITIES OF DAILY LIVING (ADL)
ADLS_ACUITY_SCORE: 42
ADLS_ACUITY_SCORE: 36
ADLS_ACUITY_SCORE: 42
ADLS_ACUITY_SCORE: 36
ADLS_ACUITY_SCORE: 42

## 2024-12-25 NOTE — ANESTHESIA PROCEDURE NOTES
Central Line/PA Catheter Placement    Pre-Procedure   Staff -        Anesthesiologist:  Sommer Larios MD       Resident/Fellow: Osbaldo Renee MD       Performed By: resident, with residents and anesthesiologist       Procedure performed by resident/fellow/CRNA in presence of a teaching physician.         Location: OR       Pre-Anesthestic Checklist: patient identified, IV checked, site marked, risks and benefits discussed, informed consent, monitors and equipment checked, pre-op evaluation and at physician/surgeon's request  Timeout:       Correct Patient: Yes        Correct Procedure: Yes        Correct Site: Yes        Correct Position: Yes        Correct Laterality: Yes   Line Placement:   This line was placed Post Induction    Procedure   Procedure: central line       Laterality: right       Insertion Site: internal jugular.       Patient Position: Trendelenburg  Sterile Prep        All elements of maximal sterile barrier technique followed       Patient Prep/Sterile Barriers: draped, hand hygiene, gloves , hat , mask , draped, gown, sterile gel and probe cover       Skin prep: Chloraprep  Insertion/Injection        Technique: ultrasound guided and Seldinger Technique        1. Ultrasound was used to evaluate the access site.       2. Vein evaluated via ultrasound for patency/adequacy.       3. Using real-time ultrasound the needle/catheter was observed entering the artery/vein.       5. The visualized structures were anatomically normal.       Type: CVC       Catheter Size: 7 Fr       Catheter Length: 20       Number of Lumens: triple lumen  Narrative         Secured by: suture       Tegaderm and Biopatch dressing used.       Complications: None apparent,        blood aspirated from all lumens,        All lumens flushed: Yes       Verification method: Ultrasound, Placement to be verified post-op and X-ray

## 2024-12-25 NOTE — ANESTHESIA CARE TRANSFER NOTE
Patient: Andrew Collier    Procedure: Procedure(s):  EXPLORATORY LAPAROTOMY, SMALL BOWEL RESECTION AND REANASTOMOSIS       Diagnosis: Small bowel obstruction (H) [K56.609]  Small bowel anastomotic leak [K91.89]  Diagnosis Additional Information: No value filed.    Anesthesia Type:   General     Note:    Oropharynx: endotracheal tube in place  Level of Consciousness: iatrogenic sedation    Level of Supplemental Oxygen (L/min / FiO2): 4  Independent Airway: airway patency not satisfactory and stable  Dentition: dentition unchanged  Vital Signs Stable: post-procedure vital signs reviewed and stable  Report to RN Given: handoff report given  Patient transferred to: PACU  Comments: Not meeting all extubation criteria. Remain intubated and sedated with propofol gtt.   Handoff Report: Identifed the Patient, Identified the Reponsible Provider, Reviewed the pertinent medical history, Discussed the surgical course, Reviewed Intra-OP anesthesia mangement and issues during anesthesia, Set expectations for post-procedure period and Allowed opportunity for questions and acknowledgement of understanding      Vitals:  Vitals Value Taken Time   /82 12/24/24 2230   Temp     Pulse 106 12/24/24 2241   Resp 14 12/24/24 2241   SpO2 98 % 12/24/24 2241   Vitals shown include unfiled device data.    Electronically Signed By: Sommer Larios MD  December 24, 2024  10:42 PM

## 2024-12-25 NOTE — PROGRESS NOTES
Emergency General Surgery Progress Note     S: Unable to get subjective     O:  /77   Pulse 108   Temp (!) 101.1  F (38.4  C) (Axillary)   Resp 17   SpO2 98%   Intubated, sedated   Not overbreathing vent   Abd was slightly distended but soft, grimacing with palpation to lower abdomen   Bilious NGT output   Ext wwp     Net -1.5L    ml     K 3.3   BUN 37  Cr 1.22   WBC 3.9 (2.4)   Hgb 10.9     CT Head   Impression:  No acute intracranial pathology. Small parietal cortical calcification  is unchanged. Scattered few chronic appearing gray-white matter  differentiation loss in the right frontal convexity similar to prior,  likely representing chronic tiny microinfarcts. No definite acute  infarct identified.    CT A/P  IMPRESSION:  1. New small to moderate volume of free fluid with free air in the  abdomen is concerning for anastomotic leak in the setting of a short  segment of small bowel dilatation and edema near the surgical  anastomosis.  2. Multifocal groundglass opacities in the lungs are worrisome for  infection. Additionally, there is a small to moderate right pleural  effusion with significant compressive atelectasis of the right lower  lobe, and a small consolidated density in the left lower lobe.    A/P: 59yoM with h/o T2DM, HIV on ART, and recent admission from 12/17-12/19 for SBO managed successfully with bowel rest/GGC who presented to ED again on 12/20 for recurrent SBO s/p ex lap/SBR on 12/20. Now s/p ex-lap/SBR/re-anastomosis for anastomotic leak on 12/24.     Admitted to SICU post-op. Still intubated.     - keep NPO, NGT for now  - ok for TPN   - zosyn started 12/24 - cont for at least 4 days. High risk of abscess  - Potts for I/Os  - midline packing kerlix every day (changed by EGS team this AM).     D/w chief resident who d/w staff     Neris Parikh, DO  General Surgery, PGY-2

## 2024-12-25 NOTE — CONSULTS
General Infectious Disease Service Consultation - Green Team    Patient:  Andrew Collier, Date of birth 1965, Medical record number [id number]  Date of Admission: 12/20/2024  Date of Visit:  12/25/2024  Requesting Provider: Christiano Obrien         Assessment and Recommendations:   Problem List:    # Streptococcus species bacteremia    # SBO s/p ex lap with small bowel stricture resection and primary anastomosis -> complicated with anastomotic leak and underwent new laparotomy on 12/24 exploratory laparotomy and small bowel resection    # HIV in ARV    Discussion:    Mr/. Andrew Collier is a 59 year old male with history significant for recurrent SBO, poorly controlled T2DM, HIV on ART/biktarvy (followed by Dr. Ivory in clinic - CD4 from 11/5/24 was 432 and HIV VL from 12/12/24 was 153) and recent admission for SBO managed conservatively with decompression and gastrograffin challenge who presented to the ED on 12/18 with abdominal pain. CT imaging with isolated segment of dilated bowel concerning for closed loop obstruction; patient was taken to the OR on 12/20/2024 for ex lap with small bowel stricture resection and primary anastomosis.  Postoperatively patient was transferred to floor, where he had continued abdominal pain, bloating, and tachycardia.  A rapid response was called morning of 12/24 for ongoing tachycardia and altered mental status, along with notable leukopenia and metabolic acidosis.  CT imaging was ordered and that afternoon showed dilation of the bowel proximal to the new anastomosis, with moderate volume of free fluid and free air in the abdomen concerning for anastomotic leak.  Patient was taken to the OR on the evening of 12/24 for exploratory laparotomy, small bowel resection, and anastomotic revision.  IntraOp course notable for 125 cm of bowel remaining proximally, and 115 cm remaining distally after resection.  Bowel appeared healthy IntraOp.  Patient  was unable to be extubated in PACU, primarily due to neurologic concerns, and was transferred to the SICU on minimal vent settings.  An NG tube was placed in the OR (difficult placement). CT chest also demonstrated Multifocal groundglass opacities in the lungs are worrisome for infection + small to moderate right pleural effusion.     SICU team consulted ID for review of antiretroviral treatment. Patient recently seen in clinic by Dr. Ivory (12/12/24). Biktarvy continued at that time. Report of relatively good adherence. CD4 from 11/5/24 was 432 so not need for prophylaxis for opportunistic infections. HIV VL from 12/12/24 was 153. Patient had a genotype/phenotype in 2012 and it did not show evidence of resistance. The viral load of 153 likely translates intermittent adherence. It is probably to low of a viral load for a new genotype test but worth to order it. Patient is now intubated and sedated, but NG tube placed on 12/25. If pharmacy does not identify any medication interaction and does not encounter any impediment in administering biktegravir through NG tube,I would continue biktarvy at this moment.     Patient developed fever on 12/24 (101.1 F) and leukopenia of 2.4. Noted that the patient underwent re-do laparotomy on 12/24 with bowel resection. White count 3.9 today. He was started on zosyn on 12/24. Blood culture from 12/24 positive for Streptococcus species in 1 bottle. Repeat blood culture from 12/25 negative to date. Peritoneal fluid culture  (intra op?) from 12/24 positive for Enterobacter cloacae and Citrobacter freundii.  Patient in the surgical ICU setting, intubated and on MV 40% and sedated. He is not on vasopressors.     Recommendations:    Given growth of Enterobacter ans Citrobacter in peritoneal fluid with cultures still incomplete and susceptibilities pending as well ss rapid deterioration and transfer to SICU, I recommend to stop zosyn and start meropenem 1 gram IV q 8h    Streptococcus  species growth in blood culture (12/24) in one bottle. Possible that this is from polymicrobial abdominal source. Meropenem would provide coverage.     Will continue to monitor cultures (repeat blood culture from 12/25 in process). If any additional growth  will consider TTE    Please obtain new blood culture tomorrow (12/26)    In regards of HIV management, patient recently seen in clinic by Dr. Ivory (12/12/24). Biktarvy continued at that time. Report of relatively good adherence. CD4 from 11/5/24 was 432 so not need for prophylaxis for opportunistic infections. HIV VL from 12/12/24 was 153. Patient had a genotype/phenotype in 2015 and it did not show evidence of resistance. The viral load of 153 likely translates intermittent adherence. It is probably too low of a viral load for a new genotype test but worth to order it (I ordered it for you). Patient is now intubated and sedated, but NG tube placed on 12/25. If pharmacy does not identify any medication interaction and does not encounter any impediment in administering biktegravir through NG tube, I would continue biktarvy at this moment.       Recommendations informed to primary team    Thank you for this consult. The General ID team will continue to follow this patient. Please feel free to call with any questions.     ROSELINE SALEH MD  Date of Service: 12/25/24  Pager: [phone]       History of Present Illness:   /Manuel Collier is a 59 year old male with history significant for recurrent SBO, poorly controlled T2DM, HIV on ART/biktarvy (followed by Dr. Ivory in clinic - CD4 from 11/5/24 was 432 and HIV VL from 12/12/24 was 153) and recent admission for SBO managed conservatively with decompression and gastrograffin challenge who presented to the ED on 12/18 with abdominal pain. CT imaging with isolated segment of dilated bowel concerning for closed loop obstruction; patient was taken to the OR on 12/20/2024 for ex lap with small  bowel stricture resection and primary anastomosis.  Postoperatively patient was transferred to floor, where he had continued abdominal pain, bloating, and tachycardia.  A rapid response was called morning of 12/24 for ongoing tachycardia and altered mental status, along with notable leukopenia and metabolic acidosis.  CT imaging was ordered and that afternoon showed dilation of the bowel proximal to the new anastomosis, with moderate volume of free fluid and free air in the abdomen concerning for anastomotic leak.  Patient was taken to the OR on the evening of 12/24 for exploratory laparotomy, small bowel resection, and anastomotic revision.  IntraOp course notable for 125 cm of bowel remaining proximally, and 115 cm remaining distally after resection.  Bowel appeared healthy IntraOp.  Patient was unable to be extubated in PACU, primarily due to neurologic concerns, and was transferred to the SICU on minimal vent settings.  An NG tube was placed in the OR (difficult placement). CT chest also demonstrated Multifocal groundglass opacities in the lungs are worrisome for infection + small to moderate right pleural effusion.       SICU team consulted ID for review of antiretroviral treatment. Patient recently seen in clinic by Dr. Ivory (12/12/24). Biktarvy continued at that time. Report of relatively good adherence. CD4 from 11/5/24 was 432 so not need for prophylaxis for opportunistic infections. HIV VL from 12/12/24 was 153. Patient had a genotype/phenotype in 2012 and it did not show evidence of resistance. The viral load of 153 likely translates intermittent adherence. It is probably to low of a viral load for a new genotype test but worth to order it. Patient is now intubated and sedated, but NG tube placed on 12/25. If pharmacy does not identify any medication interaction and does not encounter any impediment in administering biktegravir through NG tube,I would continue biktarvy at this moment.     Patient  developed fever on 12/24 (101.1 F) and leukopenia of 2.4. Noted that the patient underwent re-do laparotomy on 12/24 with bowel resection. White count 3.9 today. He was started on zosyn on 12/24. Blood culture from 12/24 positive for Streptococcus species in 1 bottle. Repeat blood culture from 12/25 negative to date. Peritoneal fluid culture  (intra op?) from 12/24 positive for Enterobacter cloacae and Citrobacter freundii.  Patient in the surgical ICU setting, intubated and on MV 40% and sedated. He is not on vasopressors.            Review of Systems:     CONSTITUTIONAL: See HPI  INTEGUMENTARY/SKIN: NEGATIVE for worrisome rashes, moles or lesions  EYES: Negative for icterus, vision changes or irritation  ENT/MOUTH:  Negative for oral lesions and sore throat  RESPIRATORY:  Negative for cough and dyspnea  CARDIOVASCULAR:  Negative for chest pain, palpitations and  shortness of breath  GASTROINTESTINAL:  See HPI  GENITOURINARY:  Negative for dysuria, hematuria, frequency and urgency  MUSCULOSKELETAL: Negative for joint pain, swelling, motion restriction, negative for musculoskeletal pain  NEURO:  Sedated  PSYCHIATRIC: Sedated  HEMATOLOGIC/LYMPHATIC: negative for lymphadenopathy or bleeding  ALLERGIC/IMMUNOLOGIC: Negative for allergic reaction   ENDOCRINE: Negative for temperature intolerance, skin/hair changes       Past Medical History:     Past Medical History:   Diagnosis Date    Acute appendicitis with localized peritonitis 01/31/2018    AIDS (H)     Allergic rhinitis due to other allergen     DNS    Anal dysplasia     Chronic abdominal pain     CNS toxoplasmosis (H)     COVID-19 01/11/2022    Diabetes type 2, controlled (H)     GERD (gastroesophageal reflux disease)     Herpes zoster 09/23/2016    History of seizure     History of substance abuse (H)     HIV (human immunodeficiency virus infection) (H)     HLD (hyperlipidemia)     Lung nodules     Periungual wart     Pneumonia 01/06/2019    PTSD (post-traumatic  "stress disorder)     Sleep apnea     doesn't use CPAP         Allergies:      Allergies   Allergen Reactions    Fentanyl Blisters     Per pt, after taking this medication had blisters develope    Tylenol [Acetaminophen] Itching    Dulaglutide Rash    Insulin Lispro Rash     Patient reported    Penicillin V Other (See Comments) and Rash     Diffuse maculopapular rash + feels \"high\", per pt.     Profile shows many uses of Zosyn             Family History:     Family History   Problem Relation Age of Onset    Diabetes Brother     Diabetes Father     Alzheimer Disease Father     Unknown/Adopted Mother     Diabetes Paternal Grandfather     Cancer No family hx of         no skin cancer    Skin Cancer No family hx of         no famiy hx of skin cancer    Glaucoma No family hx of     Macular Degeneration No family hx of             Social History:     Social History     Socioeconomic History    Marital status: Single     Spouse name: Not on file    Number of children: Not on file    Years of education: Not on file    Highest education level: Not on file   Occupational History    Occupation:      Comment: 5 years; temp agency    Occupation: Unemployed   Tobacco Use    Smoking status: Some Days     Current packs/day: 0.25     Average packs/day: 0.3 packs/day for 40.0 years (10.0 ttl pk-yrs)     Types: Cigarettes    Smokeless tobacco: Former   Substance and Sexual Activity    Alcohol use: No     Alcohol/week: 0.0 standard drinks of alcohol     Comment: Last etoh in 2007    Drug use: Not Currently     Types: Marijuana     Comment: chart indicated meth use but pt denies; states he does not use drugs    Sexual activity: Not Currently     Partners: Female, Male     Comment: Last sexual activity 2008   Other Topics Concern    Parent/sibling w/ CABG, MI or angioplasty before 65F 55M? Not Asked   Social History Narrative    Born in [facility].  Came to the [place] in 1993.  Last traveled to visit family in 2008.        " 1/7/2019 - Homeless. Working with a  at [employer]  trying to get housing. Sexually active with two partners recently using condoms 100% of the time. Smokes occasionally, not for the last 4 weeks.        1/7/2020 at [facility] since 1/1/2020. 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  Care established with ID and endocrine     Social Drivers of Health     Financial Resource Strain: High Risk (12/21/2024)    Financial Resource Strain     Within the past 12 months, have you or your family members you live with been unable to get utilities (heat, electricity) when it was really needed?: Yes   Food Insecurity: Low Risk  (12/21/2024)    Food Insecurity     Within the past 12 months, did you worry that your food would run out before you got money to buy more?: No     Within the past 12 months, did the food you bought just not last and you didn t have money to get more?: No   Recent Concern: Food Insecurity - High Risk (10/24/2024)    Food Insecurity     Within the past 12 months, did you worry that your food would run out before you got money to buy more?: Yes     Within the past 12 months, did the food you bought just not last and you didn t have money to get more?: Yes   Transportation Needs: High Risk (12/21/2024)    Transportation Needs     Within the past 12 months, has lack of transportation kept you from medical appointments, getting your medicines, non-medical meetings or appointments, work, or from getting things that you need?: Yes   Physical Activity: Not on file   Stress: Not on file   Social Connections: Unknown (4/10/2023)    Received from [facility], [facility]    Social Connections     Frequency of Communication with Friends and Family: Not on file   Interpersonal Safety: Low Risk  (12/21/2024)    Interpersonal Safety     Do you feel physically and emotionally safe where you currently live?: Yes     Within the past 12 months, have you been hit,  "slapped, kicked or otherwise physically hurt by someone?: No     Within the past 12 months, have you been humiliated or emotionally abused in other ways by your partner or ex-partner?: No   Recent Concern: Interpersonal Safety - High Risk (10/24/2024)    Interpersonal Safety     Do you feel physically and emotionally safe where you currently live?: No     Within the past 12 months, have you been hit, slapped, kicked or otherwise physically hurt by someone?: No     Within the past 12 months, have you been humiliated or emotionally abused in other ways by your partner or ex-partner?: No   Housing Stability: High Risk (12/21/2024)    Housing Stability     Do you have housing? : No     Are you worried about losing your housing?: Yes            Physical Exam:   /58   Pulse 101   Temp 99.3  F (37.4  C) (Axillary)   Resp 17   Ht 1.626 m (5' 4\")   Wt 81.4 kg (179 lb 7.3 oz)   SpO2 98%   BMI 30.80 kg/m         Exam:  GENERAL:  In the ICU, sedated and intubated  HEAD: Normocephalic and atraumatic  LUNGS:  Lung sounds mildly decrease din both bases  CARDIOVASCULAR:  Regular rate and rhythm, normal S1 S2, no murmur  ABDOMEN/SKIN:  surgical abdominal incision packed and dressing in place.   NEUROLOGIC: Sedated  PSYCHIATRIC: Sedated         Laboratory Data:     Creatinine   Date Value Ref Range Status   12/25/2024 1.22 (H) 0.67 - 1.17 mg/dL Final   12/24/2024 1.29 (H) 0.67 - 1.17 mg/dL Final   12/24/2024 1.44 (H) 0.67 - 1.17 mg/dL Final   12/24/2024 1.83 (H) 0.67 - 1.17 mg/dL Final   12/23/2024 1.04 0.67 - 1.17 mg/dL Final   06/24/2021 0.81 0.66 - 1.25 mg/dL Final   06/15/2021 0.79 0.66 - 1.25 mg/dL Final   03/10/2021 1.02 0.66 - 1.25 mg/dL Final   01/21/2021 0.91 0.66 - 1.25 mg/dL Final   01/10/2021 0.75 0.66 - 1.25 mg/dL Final     WBC   Date Value Ref Range Status   06/24/2021 9.6 4.0 - 11.0 10e9/L Final   06/15/2021 7.4 4.0 - 11.0 10e9/L Final   03/10/2021 12.0 (H) 4.0 - 11.0 10e9/L Final   01/21/2021 9.6 4.0 - " "11.0 10e9/L Final   01/10/2021 9.2 4.0 - 11.0 10e9/L Final     WBC Count   Date Value Ref Range Status   12/25/2024 3.9 (L) 4.0 - 11.0 10e3/uL Final   12/24/2024 2.4 (L) 4.0 - 11.0 10e3/uL Final   12/24/2024 3.7 (L) 4.0 - 11.0 10e3/uL Final   12/24/2024 2.4 (L) 4.0 - 11.0 10e3/uL Final   12/24/2024 2.4 (L) 4.0 - 11.0 10e3/uL Final     Hemoglobin   Date Value Ref Range Status   12/25/2024 10.9 (L) 13.3 - 17.7 g/dL Final   06/24/2021 13.5 13.3 - 17.7 g/dL Final     Hemoglobin POCT   Date Value Ref Range Status   12/24/2024 11.0 (L) 13.3 - 17.7 g/dL Final     Comment:     CRITICAL VALUES NOTED AND REPORTED TO MD     Platelet Count   Date Value Ref Range Status   12/25/2024 247 150 - 450 10e3/uL Final   06/24/2021 386 150 - 450 10e9/L Final     Lab Results   Component Value Date     (H) 12/25/2024    BUN 37.6 (H) 12/25/2024    CO2 18 (L) 12/25/2024     CRP Inflammation   Date Value Ref Range Status   02/19/2022 5.6 0.0 - 8.0 mg/L Final   02/18/2022 4.1 0.0 - 8.0 mg/L Final   01/22/2022 34.0 (H) 0.0 - 8.0 mg/L Final   10/22/2021 <2.9 0.0 - 8.0 mg/L Final   12/19/2020 7.2 0.0 - 8.0 mg/L Final   02/26/2020 7.9 0.0 - 8.0 mg/L Final   02/25/2020 17.0 (H) 0.0 - 8.0 mg/L Final   02/14/2020 5.0 0.0 - 8.0 mg/L Final   10/08/2018 <2.9 0.0 - 8.0 mg/L Final           Pertinent Recent Microbiology Data:   No results for input(s): \"CULT\", \"SDES\" in the last 168 hours.         Imaging:     Recent Results (from the past 48 hours)   XR Abdomen Port 1 View    Narrative    EXAMINATION:  XR ABDOMEN PORT 1 VIEW 12/23/2024     COMPARISON: Abdominal radiograph 12/20/2024, CT abdomen 12/20/2024.    HISTORY: NGT placement    TECHNIQUE: Frontal supine view of the abdomen.    FINDINGS: Gastric tube tip and side-port project over the stomach.  Dilated small bowel measuring 3.7 cm better appreciated on CT. No  abnormally dilated loops of bowel, free air, or pneumatosis in the  visualized abdomen. No portal venous gas.  Reticular attenuation " in  the lungs.      Impression    IMPRESSION:   1. Gastric tube tip and side-port project over the stomach.  2. Dilated loops of small bowel measuring up to 3.7 cm as seen on CT.  3. Pulmonary edema.    I have personally reviewed the examination and initial interpretation  and I agree with the findings.    JANIE VANN MD         SYSTEM ID:  [id number]   XR Chest Port 1 View    Narrative    Portable chest    INDICATION: Shortness of breath    COMPARISON: Yesterday    FINDINGS: Right hemidiaphragm elevation persists with underlying low  inspiratory volumes reported taking again note. Heart size upper  normal. No pneumothorax. Linear densities in the lower lungs similar  to previous.      Impression    IMPRESSION: No significant changes from yesterday.    MATIAS LIZ MD         SYSTEM ID:  [id number]   XR Abdomen Port 1 View    Narrative    EXAMINATION: XR ABDOMEN PORT 1 VIEW  12/24/2024 9:13 AM     COMPARISON: Radiograph 12/23/2024. CT 12/20/2024.    HISTORY: SOB, concern ileus.    TECHNIQUE: Frontal view of the abdomen.    FINDINGS: Midline staple line. Similar mildly dilated loops of small  bowel projecting over the bilateral lower quadrants measuring up to  3.9 cm. No definite pneumatosis or portal venous gas.       Impression    IMPRESSION:   Mildly dilated loops of small bowel projecting over the bilateral  lower quadrants, may represent postoperative ileus although  obstruction cannot be excluded.    I have personally reviewed the examination and initial interpretation  and I agree with the findings.    BORIS PAYTON MD         SYSTEM ID:  [id number]   XR Abdomen Port 1 View    Narrative    Exam: XR ABDOMEN PORT 1 VIEW, 12/24/2024 1:27 PM    Indication: Confirm NGT position    Comparison: Abdominal radiograph 12/24/2024 at 08:26    Findings:   Portable supine AP radiographs of the abdomen. Orogastric tube tip and  sidehole projected within the stomach. Similar appearance of mildly  dilated loops of small  bowel. No pneumatosis or portal venous gas.  Moderate stool burden. Subsequent closure staples overlying the mid  abdomen upper pelvis. No acute osseous abnormality. Lung bases are not  well-visualized.      Impression    Impression:   1. Orogastric tube tip and sidehole projected over the stomach. The  sidehole is near the GE junction, consider advancing 2-3 cm.  2. Persistent dilation of small bowel loops within the abdomen.  Findings likely represent postoperative ileus though obstruction  cannot be entirely excluded.     I have personally reviewed the examination and initial interpretation  and I agree with the findings.    ALFREDA SHIPLEY MD         SYSTEM ID:  [id number]   CT Chest/Abdomen/Pelvis w Contrast   Result Value    Radiologist flags (Urgent)     Free fluid and free air in the abdomen concerning for    Narrative    EXAMINATION: CT CHEST/ABDOMEN/PELVIS W CONTRAST, 12/24/2024 4:36 PM    TECHNIQUE:  Helical CT images from the thoracic inlet through the  symphysis pubis were obtained with IV contrast.   Contrast dose: 96 mL [id number]    COMPARISON: Same day chest and abdominal radiographs, CT abdomen and  pelvis 12/20/2024, CT chest abdomen pelvis 2/6/2023.    HISTORY: POD#4 from ex lap, SBR for SBO. septic appearing, increased  WOB, lethargic. assess for infectious etiology    FINDINGS:  Lines and Tubes: None.    CHEST:  THYROID: Thyroid is unremarkable.    MEDIASTINUM: Heart size is within normal limits. No pericardial  effusion. Mild coronary vessel calcifications atherosclerosis  prominently of the left anterior descending. Normal caliber of the  aorta and pulmonary arteries. No mediastinal lymphadenopathy.    CHEST WALL: No suspicious axillary lymphadenopathy.    LUNGS/PLEURA: Small to moderate right pleural effusion. Compressive  atelectasis throughout the right lower lobe. Partial consolidation in  the left lower lobe with trace effusion. Ground glass pulmonary  opacities in the upper lobes  bilaterally. 0.7 x 0.7 cm solid pulmonary  nodule in the right middle lobe (series 6, image 119), previously  represented on images from 2/6/2023 as a groundglass nodule measuring  approximately 0.5 x 0.4 cm. No pneumothorax. Central tracheobronchial  tree is patent.    ABDOMEN/PELVIS:  LIVER: No focal hepatic mass.    BILIARY: Cholelithiasis. No evidence of acute cholecystitis. No  biliary dilation.    PANCREAS: No focal pancreatic mass or ductal dilation.    SPLEEN: No splenic mass.    ADRENAL GLANDS: Within normal limits.    URINARY TRACT: No suspicious renal mass, renal calculi, or  hydronephrosis. No focal bladder wall thickening.    REPRODUCTIVE ORGANS: No suspicious pelvic mass. Implanted penile pump.    STOMACH: Within normal limits.    BOWEL: Dilated loop of small bowel in the central abdomen near  anastomosis which measures 5.1 cm in diameter with upstream transition  point anteriorly. Dilatation of the transverse colon measuring up to  5.6 cm, likely due to ileus. The appendix is surgically absent.    PERITONEUM/FLUID: Free fluid throughout the abdomen and pelvis. Free  air along the liver. Scattered pockets of free air elsewhere.    VESSELS: No aneurysmal dilatation of the abdominal aorta.  The portal,  splenic, and superior mesenteric veins are patent.  The origins of the  celiac and superior mesenteric arteries are patent.    LYMPH NODES: Prominent central mesenteric lymph nodes do not meet size  criteria for enlargement.    BONES/SOFT TISSUES: Soft tissues are unremarkable. No acute osseous  abnormalities or aggressive osseous lesions.       Impression    IMPRESSION:  1. New small to moderate volume of free fluid with free air in the  abdomen is concerning for anastomotic leak in the setting of a short  segment of small bowel dilatation and edema near the surgical  anastomosis.  2. Multifocal groundglass opacities in the lungs are worrisome for  infection. Additionally, there is a small to moderate  right pleural  effusion with significant compressive atelectasis of the right lower  lobe, and a small consolidated density in the left lower lobe.    [Urgent Result: Free fluid and free air in the abdomen concerning for  bowel rupture]    Finding was identified on 12/24/2024 4:55PM.     Eloina Mclainmu and Neris Parikh were contacted by Dr. Mahesh Ha at 12/24/2024 4:59 PM and verbalized understanding of the urgent  finding.     I have personally reviewed the examination and initial interpretation  and I agree with the findings.    BORIS PAYTON MD         SYSTEM ID:  [id number]   CT Head w/o Contrast    Narrative    CT HEAD W/O CONTRAST 12/24/2024 4:37 PM    History: POD#4 from ex lap, SBR for SBO. septic appearing, increased  WOB, lethargic. assess for infectious etiology vs stroke   ICD-10:    Comparison: Head CT 11/21/2024    Technique: Using multidetector thin collimation helical acquisition  technique, axial, coronal and sagittal CT images from the skull base  to the vertex were obtained without intravenous contrast.   (topogram) image(s) also obtained and reviewed.    Findings: There is no intracranial hemorrhage, mass effect, or midline  shift. Gray/white matter differentiation in both cerebral hemispheres  is preserved. Chronic appearing small cortical infarcts in the right  frontal convexity such as seen on series 3 image 13 which was present  back in 11/21/2024. No obvious new loss of gray-white matter  differentiation. Small hypodensity in the left basal ganglia,  unchanged. Small calcification in the right parietal cortex unchanged  Ventricles are proportionate to the cerebral sulci. The basal cisterns  are clear.    The bony calvaria and the bones of the skull base are normal.  Postoperative changes of left occipital craniotomy. The visualized  portions of the paranasal sinuses and mastoid air cells are clear.      Impression    Impression:  No acute intracranial pathology. Small parietal  cortical calcification  is unchanged. Scattered few chronic appearing gray-white matter  differentiation loss in the right frontal convexity similar to prior,  likely representing chronic tiny microinfarcts. No definite acute  infarct identified.    I have personally reviewed the examination and initial interpretation  and I agree with the findings.    BRUNILDA KENNEDY MD         SYSTEM ID:  [id number]   XR Chest Port 1 View    Narrative    Exam: XR CHEST PORT 1 VIEW, 12/24/2024 11:24 PM    Comparison: 12/24/2074 at 4:25 PM    History: line placement verification    Findings:  Portable AP view of the supine chest. Endotracheal tube is roughly 1.4  cm above the antonio. Right IJ CVC with tip in the right atrium.  Enteric tube courses beyond the field-of-view. Trachea is midline.  Cardiomediastinal silhouette is within normal limits. Bilateral  pleural effusions, right greater than left. Pneumoperitoneum is not  visible on this examination. No acute osseous abnormalities.      Impression    Impression: Endotracheal tube is roughly 1.4 cm above the antonio.  Bilateral pleural effusions with diffuse bilateral pulmonary  opacities, right greater than left, not significantly changed from  recent CT.    I have personally reviewed the examination and initial interpretation  and I agree with the findings.    BORIS PAYTON MD         SYSTEM ID:  [id number]   XR Chest Port 1 View    Narrative    Exam: XR CHEST PORT 1 VIEW, 12/25/2024 5:27 AM    Comparison: 12/24/2024    History: Monitor pulmonary opacities    Findings:  Portable AP view of the semiupright chest. Endotracheal tube tip is  roughly 1.6 cm above the antonio. Right IJ CVC with tip at the  cavoatrial junction.     Low lung volumes. Trachea is midline. Cardiomediastinal silhouette is  stable . No significant change in perihilar opacities. No  pneumothorax. Decreased size of bilateral pleural effusions. The  visualized upper abdomen is unremarkable. No acute  osseous  abnormalities.      Impression    Impression:   1. Endotracheal tube is roughly 1.6 cm above the antonio.  2. Decreased size of bilateral pleural effusions. No significant  change in perihilar opacities.    I have personally reviewed the examination and initial interpretation  and I agree with the findings.    BORIS PAYTON MD         SYSTEM ID:  [id number]

## 2024-12-25 NOTE — PLAN OF CARE
Problem: Adult Inpatient Plan of Care  Goal: Readiness for Transition of Care  Outcome: Not Progressing   Goal Outcome Evaluation:      Plan of Care Reviewed With: patient    Overall Patient Progress: decliningOverall Patient Progress: declining    Outcome Evaluation: patient restless all day.  ripping out ng and iv. replaced iv and NG will be replaced in surgery. urine and covid swab sent to lab.

## 2024-12-25 NOTE — PROGRESS NOTES
CLINICAL NUTRITION SERVICES - ASSESSMENT NOTE     Nutrition Prescription    RECOMMENDATIONS FOR MDs/PROVIDERS TO ORDER:  Provider to manage additional IVF with TPN initiation.     Malnutrition Status:    Unable to determine due to unable to complete NFPE and history, will follow up    Recommendations already ordered by Registered Dietitian (RD):  Ordered updated TG level for monitoring with TPN  Dosing weight:  64.7 kg (adjusted wt)  Access: central    Initial parameters (per day)  Volume:  960 mL or per pharmD  Dextrose: 95 g  AA: 100 g  Lipids: Hold lipids while patient is on propofol. Once off propofol: 250 mL 20%, 6 days per week     Dextrose titration:   Monitor lytes and if within acceptable parameters (Mg++ > or = 1.5, K+ is > or = 3, and PO4 > or = 1.9), increase dextrose by 55 g/day to goal of 260 g dextrose.    Additives: standard multivitamin and trace elements daily    Goal PN provides 260 g dextrose, 100 g AA, and (once off propofol) 250 mL 20% lipids 6 days per week for total provision of 1712 Kcals (26 Kcals/kg), 1.5 g/kg protein, GIR 2.2 mg/kg actual weight/minute, and 25% fat kcals on average daily.       Future/Additional Recommendations:  Monitor TPN related labs  Monitor propofol infusion and start lipids once propofol is off   Monitor GI function      REASON FOR ASSESSMENT  Andrew Collier is a/an 59 year old male assessed by the dietitian for Pharmacy/Nutrition to Start and Manage PN    Patient s/p 12/20/2024 ex lap with small bowel stricture resection and primary anastomosis since complicated by free fluid and free air in the abdomen concerning for anastomotic leak. Patient taken back to the OR on the evening of 12/24/24 for exploratory laparotomy, small bowel resection, and anastomotic revision. Patient has 125 cm of bowel remaining proximally and 115 cm remaining distally after resection. Patient was unable to be extubated in PACU, primarily due to neurologic concerns, and was  "transferred to the SICU for post operative care.     MEDICAL HISTORY  recurrent SBO, poorly controlled T2DM, HIV on ART, recent closed loop obstruction     NUTRITION HISTORY  Patient unable to provide history. No recent RD notes per chart review.     CURRENT NUTRITION ORDERS  Diet: NPO    Intake/Tolerance: NPO since admission    LABS   12/24/24 18:04 12/24/24 19:36 12/24/24 23:12 12/24/24 23:50 12/25/24 04:30   Sodium 144   145 148 (H)   Sodium POCT  146 (H) 145     Potassium 3.4   3.7 3.3 (L)   Potassium POCT  2.8 (L) 3.5     Chloride 116 (H)   116 (H) 120 (H)   Carbon Dioxide (CO2) 15 (L)   17 (L) 18 (L)   Urea Nitrogen 48.1 (H)   43.8 (H) 37.6 (H)   Creatinine 1.44 (H)   1.29 (H) 1.22 (H)   GFR Estimate 55 (L)   63 67   Calcium 8.6 (L)   8.7 (L) 8.5 (L)   Anion Gap 13   12 10   Magnesium     2.2   Phosphorus     1.6 (L)   Calcium, Ionized Whole Blood POCT  4.3 (L) 5.1     Glucose 190 (H)   200 (H) 99   Lactic Acid    1.4    Lactic Acid POCT  1.1 1.4        Latest Reference Range & Units 07/25/24 09:34   Triglycerides <150 mg/dL 95      Latest Reference Range & Units 02/06/24 12:00 07/13/24 15:17 10/23/24 07:21   Hemoglobin A1C <5.7 % 13.4 (H) 11.7 (H) 10.5 (H)     MEDICATIONS  Lantus pen  Zosyn  IV potassium and phos replacement   Drips: insulin; propofol @ 6.5 ml/h; IVF NS @ 125 ml/h    ANTHROPOMETRICS  Height: 162.6 cm (5' 4\")  Most Recent Weight: 81.4 kg (179 lb 7.3 oz)    IBW: 59.1 kg   Body mass index is 30.8 kg/m . BMI Category: Obesity Grade I BMI 30-34.9  Weight History: No significant weight loss noted.  Wt Readings from Last 20 Encounters:   12/25/24 81.4 kg (179 lb 7.3 oz)   12/12/24 72.1 kg (159 lb)   11/14/24 71.6 kg (157 lb 13.6 oz)   11/05/24 72.6 kg (160 lb)   10/29/24 72.6 kg (160 lb)   10/22/24 70.3 kg (155 lb)   10/11/24 74.8 kg (165 lb)   10/11/24 72.6 kg (160 lb)   10/10/24 72.6 kg (160 lb)   08/05/24 68 kg (150 lb)   08/01/24 70.8 kg (156 lb)   07/26/24 71.1 kg (156 lb 12.8 oz)   07/25/24 " "68.5 kg (151 lb)   07/21/24 70.3 kg (155 lb)   07/19/24 72.6 kg (160 lb 1.6 oz)   05/18/24 70.3 kg (155 lb)   04/22/24 70.3 kg (155 lb)   03/16/24 70.3 kg (155 lb)   02/07/24 70.9 kg (156 lb 3.2 oz)   11/09/23 74.4 kg (164 lb)         ASSESSED NUTRITION NEEDS  Dosing Weight: 64.7 kg (Adjusted BW)   Estimated Energy Needs: 6715-9359 kcals/day (25 - 30 kcals/kg)  Justification: Increased needs, Obese, Post-op, and Critical illness  Estimated Protein Needs:  grams protein/day (1.5 - 2 grams of pro/kg)  Justification: Increased needs, Post-op, and Critical illness  Estimated Fluid Needs: 6786-3026 mL/day (1 mL/kcal)   Justification: Maintenance    PHYSICAL FINDINGS  See malnutrition section below.    Per EMR:   Estevan Score: 15    MALNUTRITION  % Intake: Unable to assess  % Weight Loss: None noted  Subcutaneous Fat Loss: Unable to assess   Muscle Loss: Unable to assess  Fluid Accumulation/Edema: None noted  Malnutrition Diagnosis: Unable to determine due to unable to complete NFPE and history, will follow up    NUTRITION DIAGNOSIS  Altered GI function related to bowel resection as evidenced by need for TPN to meet nutrition needs.       INTERVENTIONS  Implementation  Parenteral Nutrition/IV Fluids - Initiate     Goals  Total avg nutritional intake to meet a minimum of 25 kcal/kg and 1.5 g PRO/kg daily (per dosing wt 64.7 kg).     Monitoring/Evaluation  Progress toward goals will be monitored and evaluated per protocol.  Anna Lee, MS, RDN, LD  [facility] - Weekend/Holiday RD - \"Weekend Clinical Dietitian\"  "

## 2024-12-25 NOTE — PROGRESS NOTES
From PACU intubated with 8 ETT 23@ teeth.  Mechanical ventilaton is initiated with vent settings CMV 12,450,5,40%.     Will continue to monitor     RT Yared on 12/25/2024 at 12:01 AM

## 2024-12-25 NOTE — PROVIDER NOTIFICATION
Shift Summary:     Pt remained intubated and mechanically ventilated on the following vent settings:      FiO2 (%): 30%  Vent Mode: CPAP/PS  Resp Rate (Set): breaths/min  Tidal Volume (Set, mL):  mL  PEEP (cm H2O): 5 cmH2O                SBT: Tolerating PST since this AM.    12/25/24 0843   Ventilator Settings    Vent Mode (S)  CPAP/PS   FiO2 40 %   PEEP (cm H2O) 5 cmH2O   Pressure Support (cm H2O) 5 cmH2O   Ventilator - Patient    Patient Resp Rate  17 breaths/min   Expiratory Vt (ml) 457   Minute Volume (ml) 7.4 L/min   Peak Inspiratory Pressure (cm H2O) 11 cmH2O   Mean Airway Pressure (cm H2O) 6.6 cmH2O   Weaning Assessment   Pulse 105   /58   Spontaneous Respiratory Rate 17 breaths/min   Spontaneous Tidal Volume (mL) 457 mL   Spontaneous Minute Ventilation 7.4 mL   RSBI 37.2   Wean Start Time  0843     Ambu bag, mask, and valve present at bedside.      RT will continue to follow and monitor pt as appropriate.     Leo Bravo, RT on 12/25/2024 at 4:56 PM

## 2024-12-25 NOTE — PROGRESS NOTES
Admitted/transferred from: PACU  Reason for admission/transfer: S/p exploratory Lapartomy & small bowel resection, remain intubated  2 RN skin assessment: completed by Geovanna Durham  Result of skin assessment and interventions/actions: Midline abdominal incision, scattered scabbing  Height, weight, drug calc weight: Done  Patient belongings (see Flowsheet)    ?

## 2024-12-25 NOTE — BRIEF OP NOTE
[facility]    Brief Operative Note    Pre-operative diagnosis: Small bowel obstruction (H) [K56.609]  Small bowel anastomotic leak [K91.89]  Post-operative diagnosis Same as pre-operative diagnosis    Procedure: EXPLORATORY LAPAROTOMY, SMALL BOWEL RESECTION AND REANASTOMOSIS, N/A - Abdomen    Surgeon: Surgeons and Role:     * Shabnam Kate MD - Primary       Libby Duarte MD - Resident assisting  Anesthesia: General   Estimated Blood Loss: 15 ml    Drains: None  Specimens:   ID Type Source Tests Collected by Time Destination   1 : Mid Small Bowel Tissue Small Intestine SURGICAL PATHOLOGY EXAM Shabnam Kate MD 12/24/2024  8:34 PM    A : Peritoneal Fluid Peritoneal Fluid Peritoneum ANAEROBIC BACTERIAL CULTURE ROUTINE, FUNGAL OR YEAST CULTURE ROUTINE, AEROBIC BACTERIAL CULTURE ROUTINE Shabnam Kate MD 12/24/2024  8:16 PM      Findings:   1L succus upon entering the abdomen. Small bowel perforation at the proximal staple line along the mesenteric edge consistent with leak. Resected small bowel, approximately 110 cm proximal healthy small bowel, and 125 cm distal.  .  Complications: None.  Implants: * No implants in log *      Received 2.5L crystalloid  700cc UOP     PLAN:  Admit to SICU  NG bridled, keep to suction, flush PRN for patency  Potts for I/Os  Strict NPO  TPN to start 12/25  Post op labs

## 2024-12-25 NOTE — H&P
SURGICAL ICU ADMISSION NOTE  12/24/2024    PRIMARY TEAM: EGS  PRIMARY PHYSICIAN: Dr. Kate    REASON FOR CRITICAL CARE ADMISSION: Vent management   CONSULTING PHYSICIAN: Dr. Mathis    ASSESSMENT: 59 year old male with history significant for recurrent SBO, poorly controlled T2DM, HIV on ART, and recent closed loop obstruction s/p 12/20/2024 ex lap with small bowel stricture resection and primary anastomosis since complicated by free fluid and free air in the abdomen concerning for anastomotic leak.  Patient taken back to the OR on the evening of 12/24 for exploratory laparotomy, small bowel resection, and anastomotic revision. Patient has 125 cm of bowel remaining proximally and 115 cm remaining distally after resection.  Patient was unable to be extubated in PACU, primarily due to neurologic concerns, and was transferred to the SICU on minimal vent settings.     Intra-op totals:  EBL 15 cc  Crystalloid 2.5 L  RBC 0  FFP 0  Plt 0  Cryo 0  Urine output 700 cc    PLAN:  Neuro/psych/pain/sedation:  #Acute postop pain  #Altered mental status, acute   #History of anxiety and panic disorder  #History of major depression  #History of methamphetamine abuse (currently sober)  #History of housing instability (currently housed)  #History of left occipital craniotomy and underlying encephalomalacia, stable  -Monitor neurological status. Notify the MD for any acute changes in exam.  -Dilaudid IV for pain. Hold oral PRNs (oxycodone, Robaxin)  -Propofol drip, Zyprexa IV as needed for sedation  -Wean sedation as able  -RASS -1 to -2  -Home meds reviewed, no active psych meds listed  -Pharmacy consult for review of medications when awake  -Delirium precautions when extubated  -Soft restraints ordered     Pulmonary care:   #History of allergic rhinitis  #History of pulmonary nodules  -Supplemental oxygen to keep saturation above 92 %.  -Incentive spirometer every 15- 30 minutes when awake.  -Wean FiO2 and PEEP as tolerated  -Aim for  possible PST tomorrow     Cardiovascular:    -Monitor hemodynamic status.   -No ongoing pressor requirements  -Goal normotension (page service for SBP's greater than 160)     GI care:   #Recurrent SBO  #Status post ex lap x 2 and new small bowel anastomosis   #History of GERD  #Ileus, acute  -Strict NPO  -Maintain NG tube, keep bridled, LIS  -Midline incision cares per primary team (wet-to-dry packing, first change by service)  -PPI prophylaxis with famotidine  -Simethicone and Tums available once able to take p.o.     Fluids/ Electrolytes/ Nutrition:   -Normal saline maintenance fluid at 125 cc/h for IV fluid hydration  -ICU electrolyte replacement protocol (high)  -Pharmacy consult for parenteral nutrition, per primary team  -Nutrition consulted. Appreciate recs     Renal/ Fluid Balance:    #Acute kidney injury  #Uremia  #Metabolic acidosis, high anion gap  -Continue maintenance fluids  -Consider additional boluses as needed  -Urine output is adequate so far (0.7 cc/kilogram/hour)  -Will continue to monitor intake and output.  -Avoid nephrotoxins  -Daily BMP     Endocrine:    #Stress hyperglycemia  -Insulin drip and 10 units Lantus ordered for diabetes management.   -Goal blood sugar less than 180     ID/ Antibiotics:  #Small bowel perforation with gross intra-abdominal contamination  #Neutropenia, acute  #Multifocal groundglass opacities in lungs concerning for infection  #Hx HIV  -Continue IV Zosyn, end of treatment TBD  -Follow blood cultures drawn 12/24  -Follow intraoperative cultures from 12/24  -COVID-negative  -UA noninfectious  -Daily chest x-ray  -Transplant ID consult for assistance with anti-retroviral therapy management     Heme:     # Acute postop anemia  -Lovenox DVT prophylaxis currently held  -Hemoglobin 11 from 10.8 preop  -Platelets within normal limits  -Daily CBC     Prophylaxis:    -Mechanical prophylaxis for DVT.   -Last dose of Lovenox DVT prophylaxis 12/23  -Famotidine GI prophylaxis,  scheduled NG tube flush orders in place     MSK:    -PT and OT consulted. Appreciate recs.     Lines/ tubes/ drains:  -Right triple-lumen internal jugular  -Right PIV  -Left radial A-line  -Potts  -ET tube     Disposition:  -Surgical ICU.    Patient seen, findings and plan discussed with surgical ICU fellow and staff.    Bere Howe MD   PGY-2 General Surgery   Pager [phone]  Vocera    - - - - - - - - - - - - - - - - - - - - - - - - - - - - - - - - - - - - - - - - - - - - - - - - - - - - - - - - - - - - - - - - - - - - - - - -     HISTORY PRESENTING ILLNESS:   59 year old male with history significant for recurrent SBO, poorly controlled T2DM, HIV on ART, and recent admission for SBO managed conservatively with decompression and gastrograffin challenge who presented to the ED on 12/18 with abdominal pain. CT imaging with isolated segment of dilated bowel concerning for closed loop obstruction; patient was taken to the OR on 12/20/2024 for ex lap with small bowel stricture resection and primary anastomosis.  Postoperatively patient was transferred to floor, where he had continued abdominal pain, bloating, and tachycardia.  A rapid response was called morning of 12/24 for ongoing tachycardia and altered mental status, along with notable leukopenia and metabolic acidosis.  CT imaging was ordered and that afternoon showed dilation of the bowel proximal to the new anastomosis, with moderate volume of free fluid and free air in the abdomen concerning for anastomotic leak.  Patient was taken to the OR on the evening of 12/24 for exploratory laparotomy, small bowel resection, and anastomotic revision.  IntraOp course notable for 125 cm of bowel remaining proximally, and 115 cm remaining distally after resection.  Bowel appeared healthy IntraOp.  Patient was unable to be extubated in PACU, primarily due to neurologic concerns, and was transferred to the SICU on minimal vent settings.  An NG tube was placed in the OR  (difficult placement).    REVIEW OF SYSTEMS: 10 point ROS neg other than the symptoms noted above in the HPI.  Unable to obtain in this intubated and sedated patient      PAST MEDICAL HISTORY:    has a past medical history of Acute appendicitis with localized peritonitis (01/31/2018), AIDS (H), Allergic rhinitis due to other allergen, Anal dysplasia, Chronic abdominal pain, CNS toxoplasmosis (H), COVID-19 (01/11/2022), Diabetes type 2, controlled (H), GERD (gastroesophageal reflux disease), Herpes zoster (09/23/2016), History of seizure, History of substance abuse (H), HIV (human immunodeficiency virus infection) (H), HLD (hyperlipidemia), Lung nodules, Periungual wart, Pneumonia (01/06/2019), PTSD (post-traumatic stress disorder), and Sleep apnea.    He has no past medical history of Hypertension.    SURGICAL HISTORY:    has a past surgical history that includes NONSPECIFIC PROCEDURE; EXPLORE UNDESC TESTIS,INGUIN/SCROTAL; Optical tracking system craniotomy, excise tumor, combined (Left, 4/10/2015); Colonoscopy (Left, 1/22/2016); Laparoscopy diagnostic (general) (N/A, 7/26/2016); Repair ligament ulnar collateral thumb (gamekeeper's) (Right, 12/2/2016); Laparoscopic appendectomy (N/A, 1/31/2018); Laparoscopy diagnostic (general) (N/A, 4/16/2018); Anoscopy (N/A, 9/9/2020); Esophagoscopy, gastroscopy, duodenoscopy (EGD), combined (N/A, 6/6/2022); Esophagoscopy, gastroscopy, duodenoscopy (EGD), combined (N/A, 10/10/2024); and Resect small bowel without ostomy (N/A, 12/20/2024).    SOCIAL HISTORY:    reports that he has been smoking cigarettes. He has a 10 pack-year smoking history. He has quit using smokeless tobacco. He reports that he does not currently use drugs after having used the following drugs: Marijuana. He reports that he does not drink alcohol.    FAMILY HISTORY: No bleeding/clotting disorders nor problems with anesthesia.     ALLERGIES:      Allergies   Allergen Reactions    Fentanyl Blisters     Per pt,  "after taking this medication had blisters develope    Tylenol [Acetaminophen] Itching    Dulaglutide Rash    Insulin Lispro Rash     Patient reported    Penicillin V Other (See Comments) and Rash     Diffuse maculopapular rash + feels \"high\", per pt.     Profile shows many uses of Zosyn        MEDICATIONS:  Current Facility-Administered Medications   Medication Dose Route Frequency Provider Last Rate Last Admin    albuterol (PROVENTIL HFA/VENTOLIN HFA) inhaler  2 puff Inhalation Q6H PRN Travis Varela MD        bictegravir-emtricitabine-tenofovir (BIKTARVY) -25 MG per tablet 1 tablet  1 tablet Oral Daily Travis Varela MD   1 tablet at 12/23/24 2144    calcium carbonate (TUMS) chewable tablet 1,000 mg  1,000 mg Oral 4x Daily PRN Travis Varela MD   1,000 mg at 12/21/24 2328    dextrose 10% infusion   Intravenous Continuous PRN Stephanie Sheehan MD        glucose gel 15-30 g  15-30 g Oral Q15 Min PRN Iris Garza MD        Or    dextrose 50 % injection 25-50 mL  25-50 mL Intravenous Q15 Min PRN Iris Garza MD        Or    glucagon injection 1 mg  1 mg Subcutaneous Q15 Min PRN Iris Garza MD        enoxaparin ANTICOAGULANT (LOVENOX) injection 40 mg  40 mg Subcutaneous Q24H Neris Parikh MD   40 mg at 12/23/24 2047    famotidine (PEPCID) tablet 20 mg  20 mg Oral Daily Christiano Obrien MD        Or    famotidine (PEPCID) injection 20 mg  20 mg Intravenous Daily Christiano Obrien MD        insulin glargine (LANTUS PEN) injection 10 Units  10 Units Subcutaneous QAM AC Stephanie Shehean MD   10 Units at 12/24/24 0853    insulin regular (MYXREDLIN) 1 unit/mL infusion  0-24 Units/hr Intravenous Continuous Stephanie Sheehan MD 2 mL/hr at 12/24/24 2326 2 Units/hr at 12/24/24 2326    [Held by provider] ketorolac (TORADOL) injection 30 mg  30 mg Intravenous Q8H PRN Av Cormier MD   30 mg at 12/23/24 1020    Lidocaine (LIDOCARE) 4 % Patch 1 patch  1 patch Transdermal " Q24h Luigi Mcadams MD   1 patch at 12/24/24 0405    lidocaine (LMX4) cream   Topical Q1H PRN Kathy Sierra MD        lidocaine 1 % 0.1-1 mL  0.1-1 mL Other Q1H PRN Kathy Sierra MD        methocarbamol (ROBAXIN) tablet 500 mg  500 mg Oral TID Franklin Rodas MD   500 mg at 12/24/24 0807    naloxone (NARCAN) injection 0.2 mg  0.2 mg Intravenous Q2 Min PRN Iris Garza MD   0.2 mg at 12/24/24 1516    Or    naloxone (NARCAN) injection 0.4 mg  0.4 mg Intravenous Q2 Min PRN Iris Garza MD        Or    naloxone (NARCAN) injection 0.2 mg  0.2 mg Intramuscular Q2 Min PRN Iris Garza MD        Or    naloxone (NARCAN) injection 0.4 mg  0.4 mg Intramuscular Q2 Min PRN Iris Garza MD        OLANZapine (zyPREXA) injection 5 mg  5 mg Intramuscular Daily PRN Franklin Rodas MD        Or    OLANZapine (zyPREXA) tablet 5 mg  5 mg Oral Daily PRN Franklin Rodas MD        piperacillin-tazobactam (ZOSYN) intermittent infusion 3.375 g  3.375 g Intravenous Q6H Libby Duarte MD   3.375 g at 12/24/24 2008    propofol (DIPRIVAN) infusion  5-75 mcg/kg/min Intravenous Continuous Sommer Larios MD 10.8 mL/hr at 12/24/24 2244 25 mcg/kg/min at 12/24/24 2244    simethicone (MYLICON) chewable tablet 80 mg  80 mg Oral Q6H PRN Salina Mcdonald MD   80 mg at 12/22/24 2342    sodium chloride (PF) 0.9% PF flush 3 mL  3 mL Intracatheter Q8H Kathy Sierra MD   3 mL at 12/23/24 1140    sodium chloride (PF) 0.9% PF flush 3 mL  3 mL Intracatheter q1 min prn Kathy Sierra MD        sodium chloride 0.9 % infusion   Intravenous Continuous Kathy Sierra  mL/hr at 12/23/24 0524 New Bag at 12/23/24 0524       PHYSICAL EXAMINATION:  Temp:  [97.9  F (36.6  C)-101.1  F (38.4  C)] 100  F (37.8  C)  Pulse:  [106-140] 110  Resp:  [13-23] 21  BP: (118-160)/(72-92) 118/82  MAP:  [55 mmHg-99 mmHg] 55 mmHg  Arterial Line BP: (130-145)/(69-83) 131/69  FiO2 (%):  [40 %] 40 %  SpO2:  [81 %-98 %] 98  %    FiO2 (%): 40 %, Resp: 18, Vent Mode: CMV/AC, Resp Rate (Set): 12 breaths/min, Tidal Volume (Set, mL): 450 mL, PEEP (cm H2O): 5 cmH2O, Resp Rate (Set): 12 breaths/min, Tidal Volume (Set, mL): 450 mL, PEEP (cm H2O): 5 cmH2O    Gen: intubated, sedated  HEENT: Atraumatic, normocephalic  Neuro: non-responsive to light stimuli  Pulm: Vent settings as above  CV: Borderline tachycardia on telemetry, hypertensive on arrival, warm to touch  ABD:soft, mildly distended, appropriately tender, midline dressing CDI, abdominal binder in place  MSK:  No pitting edema  Skin: Warm, dry, no rashes or abrasions on exposed skin  Psych: Unable to assess    LABS: Reviewed.   Arterial Blood Gases   Recent Labs   Lab 12/24/24 2312 12/24/24 1936   PH 7.36 7.31*   PCO2 33* 33*   PO2 89 88   HCO3 19* 17*     Complete Blood Count   Recent Labs   Lab 12/24/24  2312 12/24/24  1936 12/24/24  1804 12/24/24  0704 12/23/24  0703 12/22/24  0624   WBC  --   --  3.7* 2.4*  2.4* 4.5 7.9   HGB 11.0* 8.9* 10.8* 12.4* 13.0* 13.5   PLT  --   --  259 241 271 260     Basic Metabolic Panel  Recent Labs   Lab 12/24/24  2326 12/24/24  2312 12/24/24  2235 12/24/24  1936 12/24/24  1822 12/24/24  1804 12/24/24  1112 12/24/24  0704 12/23/24  0749 12/23/24  0703 12/22/24  0831 12/22/24  0624   NA  --  145  --  146*  --  144  --  143  --  140  --  136   POTASSIUM  --  3.5  --  2.8*  --  3.4  --  3.8  --  4.3  --  4.1   CHLORIDE  --   --   --   --   --  116*  --  112*  --  108*  --  105   CO2  --   --   --   --   --  15*  --  16*  --  19*  --  17*   BUN  --   --   --   --   --  48.1*  --  48.2*  --  28.9*  --  17.7   CR  --   --   --   --   --  1.44*  --  1.83*  --  1.04  --  0.70   * 192* 180* 154*   < > 190*   < > 164*   < > 273*   < > 237*    < > = values in this interval not displayed.     Liver Function Tests  Recent Labs   Lab 12/21/24  0921 12/20/24  1506 12/18/24  0709   AST 18 23 17   ALT <5 12 <5   ALKPHOS 72 132 99   BILITOTAL 1.3* 0.6 0.6  "  ALBUMIN 3.4* 4.1 3.5   INR  --  0.97  --      Pancreatic Enzymes  Recent Labs   Lab 12/20/24  1506   LIPASE 57     Coagulation Profile  Recent Labs   Lab 12/20/24  1506   INR 0.97   PTT 28     Lactate  Invalid input(s): \"LACTATE\"    IMAGING:  Recent Results (from the past 24 hours)   XR Chest Port 1 View    Narrative    Portable chest    INDICATION: Shortness of breath    COMPARISON: Yesterday    FINDINGS: Right hemidiaphragm elevation persists with underlying low  inspiratory volumes reported taking again note. Heart size upper  normal. No pneumothorax. Linear densities in the lower lungs similar  to previous.      Impression    IMPRESSION: No significant changes from yesterday.    MATIAS LIZ MD         SYSTEM ID:  [id number]   XR Abdomen Port 1 View    Narrative    EXAMINATION: XR ABDOMEN PORT 1 VIEW  12/24/2024 9:13 AM     COMPARISON: Radiograph 12/23/2024. CT 12/20/2024.    HISTORY: SOB, concern ileus.    TECHNIQUE: Frontal view of the abdomen.    FINDINGS: Midline staple line. Similar mildly dilated loops of small  bowel projecting over the bilateral lower quadrants measuring up to  3.9 cm. No definite pneumatosis or portal venous gas.       Impression    IMPRESSION:   Mildly dilated loops of small bowel projecting over the bilateral  lower quadrants, may represent postoperative ileus although  obstruction cannot be excluded.    I have personally reviewed the examination and initial interpretation  and I agree with the findings.    BORIS PAYTON MD         SYSTEM ID:  [id number]   XR Abdomen Port 1 View    Narrative    Exam: XR ABDOMEN PORT 1 VIEW, 12/24/2024 1:27 PM    Indication: Confirm NGT position    Comparison: Abdominal radiograph 12/24/2024 at 08:26    Findings:   Portable supine AP radiographs of the abdomen. Orogastric tube tip and  sidehole projected within the stomach. Similar appearance of mildly  dilated loops of small bowel. No pneumatosis or portal venous gas.  Moderate stool burden. " Subsequent closure staples overlying the mid  abdomen upper pelvis. No acute osseous abnormality. Lung bases are not  well-visualized.      Impression    Impression:   1. Orogastric tube tip and sidehole projected over the stomach. The  sidehole is near the GE junction, consider advancing 2-3 cm.  2. Persistent dilation of small bowel loops within the abdomen.  Findings likely represent postoperative ileus though obstruction  cannot be entirely excluded.     I have personally reviewed the examination and initial interpretation  and I agree with the findings.    ALFREDA SHIPLEY MD         SYSTEM ID:  [id number]   CT Chest/Abdomen/Pelvis w Contrast   Result Value    Radiologist flags (Urgent)     Free fluid and free air in the abdomen concerning for    Narrative    EXAMINATION: CT CHEST/ABDOMEN/PELVIS W CONTRAST, 12/24/2024 4:36 PM    TECHNIQUE:  Helical CT images from the thoracic inlet through the  symphysis pubis were obtained with IV contrast.   Contrast dose: 96 mL [id number]    COMPARISON: Same day chest and abdominal radiographs, CT abdomen and  pelvis 12/20/2024, CT chest abdomen pelvis 2/6/2023.    HISTORY: POD#4 from ex lap, SBR for SBO. septic appearing, increased  WOB, lethargic. assess for infectious etiology    FINDINGS:  Lines and Tubes: None.    CHEST:  THYROID: Thyroid is unremarkable.    MEDIASTINUM: Heart size is within normal limits. No pericardial  effusion. Mild coronary vessel calcifications atherosclerosis  prominently of the left anterior descending. Normal caliber of the  aorta and pulmonary arteries. No mediastinal lymphadenopathy.    CHEST WALL: No suspicious axillary lymphadenopathy.    LUNGS/PLEURA: Small to moderate right pleural effusion. Compressive  atelectasis throughout the right lower lobe. Partial consolidation in  the left lower lobe with trace effusion. Ground glass pulmonary  opacities in the upper lobes bilaterally. 0.7 x 0.7 cm solid pulmonary  nodule in the right middle  lobe (series 6, image 119), previously  represented on images from 2/6/2023 as a groundglass nodule measuring  approximately 0.5 x 0.4 cm. No pneumothorax. Central tracheobronchial  tree is patent.    ABDOMEN/PELVIS:  LIVER: No focal hepatic mass.    BILIARY: Cholelithiasis. No evidence of acute cholecystitis. No  biliary dilation.    PANCREAS: No focal pancreatic mass or ductal dilation.    SPLEEN: No splenic mass.    ADRENAL GLANDS: Within normal limits.    URINARY TRACT: No suspicious renal mass, renal calculi, or  hydronephrosis. No focal bladder wall thickening.    REPRODUCTIVE ORGANS: No suspicious pelvic mass. Implanted penile pump.    STOMACH: Within normal limits.    BOWEL: Dilated loop of small bowel in the central abdomen near  anastomosis which measures 5.1 cm in diameter with upstream transition  point anteriorly. Dilatation of the transverse colon measuring up to  5.6 cm, likely due to ileus. The appendix is surgically absent.    PERITONEUM/FLUID: Free fluid throughout the abdomen and pelvis. Free  air along the liver. Scattered pockets of free air elsewhere.    VESSELS: No aneurysmal dilatation of the abdominal aorta.  The portal,  splenic, and superior mesenteric veins are patent.  The origins of the  celiac and superior mesenteric arteries are patent.    LYMPH NODES: Prominent central mesenteric lymph nodes do not meet size  criteria for enlargement.    BONES/SOFT TISSUES: Soft tissues are unremarkable. No acute osseous  abnormalities or aggressive osseous lesions.       Impression    IMPRESSION:  1. New small to moderate volume of free fluid with free air in the  abdomen is concerning for anastomotic leak in the setting of a short  segment of small bowel dilatation and edema near the surgical  anastomosis.  2. Multifocal groundglass opacities in the lungs are worrisome for  infection. Additionally, there is a small to moderate right pleural  effusion with significant compressive atelectasis of the  right lower  lobe, and a small consolidated density in the left lower lobe.    [Urgent Result: Free fluid and free air in the abdomen concerning for  bowel rupture]    Finding was identified on 12/24/2024 4:55PM.     Eloina Mclainmu and Neris Parikh were contacted by Dr. Mahesh Ha at 12/24/2024 4:59 PM and verbalized understanding of the urgent  finding.     I have personally reviewed the examination and initial interpretation  and I agree with the findings.    BORIS PAYTON MD         SYSTEM ID:  [id number]   CT Head w/o Contrast    Narrative    CT HEAD W/O CONTRAST 12/24/2024 4:37 PM    History: POD#4 from ex lap, SBR for SBO. septic appearing, increased  WOB, lethargic. assess for infectious etiology vs stroke   ICD-10:    Comparison: Head CT 11/21/2024    Technique: Using multidetector thin collimation helical acquisition  technique, axial, coronal and sagittal CT images from the skull base  to the vertex were obtained without intravenous contrast.   (topogram) image(s) also obtained and reviewed.    Findings: There is no intracranial hemorrhage, mass effect, or midline  shift. Gray/white matter differentiation in both cerebral hemispheres  is preserved. Chronic appearing small cortical infarcts in the right  frontal convexity such as seen on series 3 image 13 which was present  back in 11/21/2024. No obvious new loss of gray-white matter  differentiation. Small hypodensity in the left basal ganglia,  unchanged. Small calcification in the right parietal cortex unchanged  Ventricles are proportionate to the cerebral sulci. The basal cisterns  are clear.    The bony calvaria and the bones of the skull base are normal.  Postoperative changes of left occipital craniotomy. The visualized  portions of the paranasal sinuses and mastoid air cells are clear.      Impression    Impression:  No acute intracranial pathology. Small parietal cortical calcification  is unchanged. Scattered few chronic appearing  gray-white matter  differentiation loss in the right frontal convexity similar to prior,  likely representing chronic tiny microinfarcts. No definite acute  infarct identified.    I have personally reviewed the examination and initial interpretation  and I agree with the findings.    BRUNILDA KENNEDY MD         SYSTEM ID:  [id number]   XR Chest Port 1 View    Narrative    Exam: XR CHEST PORT 1 VIEW, 12/24/2024 11:24 PM    Comparison: 12/24/2074 at 4:25 PM    History: line placement verification    Findings:  Portable AP view of the supine chest. Endotracheal tube is roughly 1.4  cm above the antonio. Right IJ CVC with tip in the right atrium.  Enteric tube courses beyond the field-of-view. Trachea is midline.  Cardiomediastinal silhouette is within normal limits. Bilateral  pleural effusions, right greater than left. Pneumoperitoneum is not  visible on this examination. No acute osseous abnormalities.      Impression    Impression: Endotracheal tube is roughly 1.4 cm above the antonio.  Bilateral pleural effusions with diffuse bilateral pulmonary  opacities, right greater than left, not significantly changed from  recent CT.    I have personally reviewed the examination and initial interpretation  and I agree with the findings.    BORIS PAYTON MD         SYSTEM ID:  [id number]

## 2024-12-25 NOTE — PLAN OF CARE
Goal Outcome Evaluation:      Plan of Care Reviewed With: patient    Overall Patient Progress: no changeOverall Patient Progress: no change    Outcome Evaluation: Intubated, clearing sedation    Neuro: Weaning sedation - pt following simple commands on 0.2 precedex this evening but easily drifting off/not opening eyes. Restraints remain for safety at this time.   CV: HR 80-100s. SBP 90-100s. Tmax: 99.3  Pulm: Coarse lung sounds. CMV settings. PS most of day 5/5 40%.  GI: OG bridles from OR - LIS; orders to flush q8. Strict NPO.  : Urinary catheter, good output.  Surg: Abdominal packing - changed early this AM by surgical team. Abdominal binder.   IV/Gtt: PIV. RIJ. Potassium x2 & phos replaced. NS at 125 - plan to drop with TPN started. Insulin at 1.5.    Plan: TPN to start tonight. Continue for sedation to clear - possible extubation.      Zulma Zarco RN

## 2024-12-25 NOTE — OP NOTE
OPERATIVE REPORT    NAME: Andrew Collier  MRN: [id number]     DATE OF SURGERY: 12/24/2024       PREOPERATIVE DIAGNOSIS: Small bowel anastomotic leak       POSTOPERATIVE DIAGNOSIS: Same as above      PROCEDURE: EXPLORATORY LAPAROTOMY, SMALL BOWEL RESECTION AND REANASTOMOSIS, N/A - Abdomen       SURGEON: Shabnam Kate MD      ASSISTANT(S): Libby Duarte MD - resident    ANESTHESIA: General    EBL: 15 ml    SPECIMEN(S):   1 : Mid Small Bowel Tissue Small Intestine SURGICAL PATHOLOGY EXAM Shabnam Kate MD 12/24/2024  8:34 PM     A : Peritoneal Fluid Peritoneal Fluid Peritoneum ANAEROBIC BACTERIAL CULTURE ROUTINE, FUNGAL OR YEAST CULTURE ROUTINE, AEROBIC BACTERIAL CULTURE ROUTINE Shabnam Kate MD 12/24/2024  8:16 PM          FINDING(S):  1L succus upon entering the abdomen. Small bowel perforation at the proximal staple line along the mesenteric edge consistent with leak. Resected small bowel, approximately 110 cm proximal healthy small bowel, and 125 cm distal.  .     COMPLICATION(S): None     INDICATIONS: Andrew Collier is a 59 year old male who initially presented with SBO earlier this week, underwent ex lap with small bowel resection for ileal stricture causing SBO. Postoperative course initially unremarkable, but more lethargic, tachycardic, and febrile today, with increased distention after inadvertently removing NG and with increased respiratory distress. CT abdomen/pelvis with findings of free fluid and air tracking from anastomosis with significantly dilated proximal small bowel and fecalization of contents, concerning for anastomotic leak. Patient able to have basic conversation, but remains lethargic and altered. No family, friends or guardian to discuss. Given urgent nature, proceeded to OR after discussing with patient.      DESCRIPTION OF PROCEDURE:  The patient was brought to the operating room and general anesthesia was induced. The patient was positioned supine and the  abdomen was prepped and draped in the usual sterile fashion. Preop antibiotics had been given prior to surgery. A granados was placed. SCDs were placed and turned on. A timeout was performed.      Staples were removed using the prior midline laparotomy incision, dissection was carried through the subcutaneous tissues to the level of the fascia. Fascial suture cut and released to enter the abdomen. Upon entering the abdomen we encountered a large volume of succus, approximately 1L. There were several inflammatory interloop adhesions with pockets of fluid which was suctioned. The prior small bowel anastomosis remained within the central abdomen. In  the interloop adhesions we were able to expose this adequately. There was an obvious perforation along the proximal staple line measuring around 1cm. The bowel just proximal to this was significantly dilated and thickened. We followed this proximally and distally, measuring 125 cm proximal to the ligament of Treitz, and 115 cm distally to the terminal ileum. Due to location, we did not feel this would be amendable to an ostomy. We therefore decided to proceed with a bowel resection. A window was made in the mesentery and the small bowel was divided using a 75mm blue load TIMOTHY stapler, both proximally and distally to the anastomosis. The mesentery was divided using Ligasure. The bowel was aligned in a side to side functional end to end fashion, and stay stitches were placed proximally and distally. An enterotomy was made on each limb and the 75mm blue load TIMOTHY stapler was advanced, ensuring closure along the antimesenteric side to create the common channel. The stapler was removed and the lumen inspected for hemostasis. The common enterotomy was closed using interrupted 3-0 vicryl sutures in a single full thickness layer. Stay sutures were secured down. The staple lines were inspected, hemostatic and without evidence of leak. The mesentery was closed with a running  3-0 vicryl suture.    The abdomen was irrigated copiously with 6L warm saline. We ensured hemostasis and positioning of the NG within the stomach on palpation. The fascia was closed with 0-looped PDS in running fashion. The skin was irrigated once more with warm saline. Wound was left open with slightly moistened kerlix, ABDs and tape. The patient tolerated the procedure well.     Surgical counts were reported as correct at the conclusion of the case x2. The patient was slow to awaken from anesthesia, and given altered state preop, it was felt it would be safest to remain intubated. He was brought to PACU for monitoring and ultimately admitted to the SICU postoperatively. There were no immediate complications. Dr. Kate was present for all portions of the operation.    Libby Duarte MD  PGY-5 General Surgery

## 2024-12-25 NOTE — ANESTHESIA PROCEDURE NOTES
Airway       Patient location during procedure: OR       Procedure Start/Stop Times: 12/24/2024 7:11 PM  Staff -        Anesthesiologist:  Sommer Larios MD       Resident/Fellow: Osbaldo Renee MD       Performed By: resident and with residents       Procedure performed by resident/fellow/CRNA in presence of a teaching physician.    Consent for Airway        Urgency: elective  Indications and Patient Condition       Indications for airway management: tammy-procedural       Induction type:RSI       Mask difficulty assessment: 0 - not attempted    Final Airway Details       Final airway type: endotracheal airway       Successful airway: ETT - single  Endotracheal Airway Details        ETT size (mm): 7.5       Cuffed: yes       Successful intubation technique: video laryngoscopy       VL Blade Size: Glidescope 3       Grade View of Cords: 1       Adjucts: stylet       Position: Right       Measured from: lips       Secured at (cm): 22       Bite block used: Soft    Post intubation assessment        Placement verified by: capnometry, equal breath sounds and chest rise        Number of attempts at approach: 1       Number of other approaches attempted: 0       Secured with: tape       Ease of procedure: easy       Dentition: Intact and Unchanged    Medication(s) Administered   Medication Administration Time: 12/24/2024 7:11 PM

## 2024-12-25 NOTE — PROGRESS NOTES
SURGICAL ICU PROGRESS NOTE  12/25/2024      PRIMARY TEAM: EGS  PRIMARY PHYSICIAN: Dr. Kate     REASON FOR CRITICAL CARE ADMISSION: Vent management   CONSULTING PHYSICIAN: Dr. Mathis     ASSESSMENT: 59 year old male with history significant for recurrent SBO, poorly controlled T2DM, HIV on ART, and recent closed loop obstruction s/p 12/20/2024 ex lap with small bowel stricture resection and primary anastomosis since complicated by free fluid and free air in the abdomen concerning for anastomotic leak.  Patient taken back to the OR on the evening of 12/24 for exploratory laparotomy, small bowel resection, and anastomotic revision. Patient has 125 cm of bowel remaining proximally and 115 cm remaining distally after resection.  Patient was unable to be extubated in PACU, primarily due to neurologic concerns, and was transferred to the SICU on minimal vent settings.      Updates:   - switch to precedex  - discontinued unused PO orders  - Transplant ID consult for HIV medication review/management  - consult for TPN placed     PLAN:  Neuro/psych/pain/sedation:  #Acute postop pain  #Altered mental status, acute   #History of anxiety and panic disorder  #History of major depression  #History of methamphetamine abuse (currently sober)  #History of housing instability (currently housed)  #History of left occipital craniotomy and underlying encephalomalacia, stable  -Monitor neurological status. Notify the MD for any acute changes in exam.  -Dilaudid IV for pain. Hold oral PRNs (oxycodone, Robaxin)  -Propofol drip, Zyprexa IV as needed for sedation  -Switch sedation to precedex  -RASS 0 to -1  -Home meds reviewed, no active psych meds listed  -Pharmacy consult for review of medications when awake  -Delirium precautions when extubated  -Soft restraints ordered     Pulmonary care:   #History of allergic rhinitis  #History of pulmonary nodules  -Supplemental oxygen to keep saturation above 92 %.  -Incentive spirometer every  15- 30 minutes when awake.  -Wean FiO2 and PEEP as tolerated  -PST today      Cardiovascular:    -Monitor hemodynamic status.   -No ongoing pressor requirements  -Goal normotension (page service for SBP's greater than 160)     GI care:   #Recurrent SBO  #Status post ex lap x 2 and new small bowel anastomosis   #History of GERD  #Ileus, acute  -Strict NPO  -Maintain NG tube, keep bridled, LIS  -Midline incision cares per primary team (wet-to-dry packing, first change by service)  -PPI prophylaxis with famotidine  -Simethicone and Tums available once able to take p.o.      Fluids/ Electrolytes/ Nutrition:   -Normal saline maintenance fluid at 125 cc/h for IV fluid hydration   ->  decrease to 75 ml on initiation of TPN  -ICU electrolyte replacement protocol (high)  -Pharmacy consult for parenteral nutrition  -Nutrition consulted. Appreciate recs     Renal/ Fluid Balance:    #Acute kidney injury  #Uremia  #Metabolic acidosis, high anion gap  -Continue maintenance fluids  -Consider additional fluid boluses as needed  -Urine output is adequate so far (0.8 cc/kilogram/hour)  -Will continue to monitor intake and output.  -Avoid nephrotoxins  -Daily BMP      Endocrine:    #Stress hyperglycemia  -Insulin drip and 10 units Lantus ordered for diabetes management.   -Goal blood sugar less than 180      ID/ Antibiotics:  #Small bowel perforation with gross intra-abdominal contamination  #Neutropenia, acute  #Multifocal groundglass opacities in lungs concerning for infection  #Hx HIV  -Continue IV Zosyn, end of treatment TBD  -Follow blood cultures drawn 12/24  -Follow intraoperative cultures from 12/24  -COVID-negative  -UA noninfectious  -Daily chest x-ray  -Transplant ID consult for assistance with anti-retroviral therapy management      Heme:     # Acute postop anemia  -Lovenox DVT prophylaxis to start tonight (24H postop)  -Hemoglobin 11 from 10.8 preop  -Platelets within normal limits  -Daily CBC      Prophylaxis:     -Mechanical prophylaxis for DVT.   -Last dose of Lovenox DVT prophylaxis 12/23, resume 12/25  -Famotidine GI prophylaxis, scheduled NG tube flush orders in place      MSK:    -PT and OT consulted. Appreciate recs.      Lines/ tubes/ drains:  -Right triple-lumen internal jugular  -Right PIV  -Left radial A-line  -Potts  -ET tube      Disposition:  -Surgical ICU.    Patient seen and discussed with staff.    Bere Howe MD   PGY-2 General Surgery   Pager [phone]  Vocera    ====================================    TODAY'S SUBJECTIVE/INTERVAL HISTORY:   NAEO. First dressing change to be done by service. Hypertensive on arrival, normalized. No pressor requirements.    OBJECTIVE:     Temp:  [99.5  F (37.5  C)-101.1  F (38.4  C)] 101.1  F (38.4  C)  Pulse:  [104-140] 104  Resp:  [13-23] 18  BP: (118-160)/(72-92) 130/77  MAP:  [55 mmHg-106 mmHg] 77 mmHg  Arterial Line BP: ()/(56-94) 118/58  FiO2 (%):  [40 %] 40 %  SpO2:  [81 %-100 %] 99 %  FiO2 (%): 40 %, Resp: 18, Vent Mode: CMV/AC, Resp Rate (Set): 12 breaths/min, Tidal Volume (Set, mL): 450 mL, PEEP (cm H2O): 5 cmH2O, Resp Rate (Set): 12 breaths/min, Tidal Volume (Set, mL): 450 mL, PEEP (cm H2O): 5 cmH2O    I/O last 3 completed shifts:  In: 8599.86 [I.V.:8049.86; IV Piggyback:550]  Out: 1705 [Urine:1590; Emesis/NG output:100; Blood:15]    Gen: Alert and conversational adult male in NAD, oriented x3  Eyes: Nonicteric  ENT: Mucous Membranes Moist  Pulm: Nonlabored Breathing on RA  CV: RRR by palpation  Abd: Soft, mildly distended LLQ with tenderness to deep palpation and possible guarding, no rebound, no peritonitis  Skin: WWP  Extremities: MAEE  Neuro: CN grossly intact, no focal deficits  Psych: Appropriate, conversational    LABS:   Arterial Blood Gases   Recent Labs   Lab 12/25/24  0432 12/25/24  0024 12/24/24  2312 12/24/24  1936   PH 7.44 7.38 7.36 7.31*   PCO2 30* 32* 33* 33*   PO2 78* 92 89 88   HCO3 20* 19* 19* 17*     Complete Blood Count   Recent Labs    Lab 12/25/24  0430 12/24/24  2350 12/24/24 2312 12/24/24 1936 12/24/24  1804 12/24/24  0704   WBC 3.9* 2.4*  --   --  3.7* 2.4*  2.4*   HGB 10.9* 11.0* 11.0* 8.9* 10.8* 12.4*    245  --   --  259 241     Basic Metabolic Panel  Recent Labs   Lab 12/25/24  0544 12/25/24  0430 12/25/24  0429 12/25/24  0140 12/24/24 2350 12/24/24 2326 12/24/24 2312 12/24/24 2235 12/24/24 1936 12/24/24 1822 12/24/24 1804 12/24/24  1112 12/24/24  0704   NA  --  148*  --   --  145  --  145  --  146*  --  144  --  143   POTASSIUM  --  3.3*  --   --  3.7  --  3.5  --  2.8*  --  3.4  --  3.8   CHLORIDE  --  120*  --   --  116*  --   --   --   --   --  116*  --  112*   CO2  --  18*  --   --  17*  --   --   --   --   --  15*  --  16*   BUN  --  37.6*  --   --  43.8*  --   --   --   --   --  48.1*  --  48.2*   CR  --  1.22*  --   --  1.29*  --   --   --   --   --  1.44*  --  1.83*   * 99 102* 152* 200*   < > 192*   < > 154*   < > 190*   < > 164*    < > = values in this interval not displayed.     Liver Function Tests  Recent Labs   Lab 12/21/24  0921 12/20/24  1506   AST 18 23   ALT <5 12   ALKPHOS 72 132   BILITOTAL 1.3* 0.6   ALBUMIN 3.4* 4.1   INR  --  0.97     Pancreatic Enzymes  Recent Labs   Lab 12/20/24  1506   LIPASE 57     Coagulation Profile  Recent Labs   Lab 12/20/24  1506   INR 0.97   PTT 28         IMAGING:   Recent Results (from the past 24 hours)   XR Chest Port 1 View    Narrative    Portable chest    INDICATION: Shortness of breath    COMPARISON: Yesterday    FINDINGS: Right hemidiaphragm elevation persists with underlying low  inspiratory volumes reported taking again note. Heart size upper  normal. No pneumothorax. Linear densities in the lower lungs similar  to previous.      Impression    IMPRESSION: No significant changes from yesterday.    MATIAS LIZ MD         SYSTEM ID:  [id number]   XR Abdomen Port 1 View    Narrative    EXAMINATION: XR ABDOMEN PORT 1 VIEW  12/24/2024 9:13 AM      COMPARISON: Radiograph 12/23/2024. CT 12/20/2024.    HISTORY: SOB, concern ileus.    TECHNIQUE: Frontal view of the abdomen.    FINDINGS: Midline staple line. Similar mildly dilated loops of small  bowel projecting over the bilateral lower quadrants measuring up to  3.9 cm. No definite pneumatosis or portal venous gas.       Impression    IMPRESSION:   Mildly dilated loops of small bowel projecting over the bilateral  lower quadrants, may represent postoperative ileus although  obstruction cannot be excluded.    I have personally reviewed the examination and initial interpretation  and I agree with the findings.    BORIS PAYTON MD         SYSTEM ID:  [id number]   XR Abdomen Port 1 View    Narrative    Exam: XR ABDOMEN PORT 1 VIEW, 12/24/2024 1:27 PM    Indication: Confirm NGT position    Comparison: Abdominal radiograph 12/24/2024 at 08:26    Findings:   Portable supine AP radiographs of the abdomen. Orogastric tube tip and  sidehole projected within the stomach. Similar appearance of mildly  dilated loops of small bowel. No pneumatosis or portal venous gas.  Moderate stool burden. Subsequent closure staples overlying the mid  abdomen upper pelvis. No acute osseous abnormality. Lung bases are not  well-visualized.      Impression    Impression:   1. Orogastric tube tip and sidehole projected over the stomach. The  sidehole is near the GE junction, consider advancing 2-3 cm.  2. Persistent dilation of small bowel loops within the abdomen.  Findings likely represent postoperative ileus though obstruction  cannot be entirely excluded.     I have personally reviewed the examination and initial interpretation  and I agree with the findings.    ALFREDA SHIPLEY MD         SYSTEM ID:  [id number]   CT Chest/Abdomen/Pelvis w Contrast   Result Value    Radiologist flags (Urgent)     Free fluid and free air in the abdomen concerning for    Narrative    EXAMINATION: CT CHEST/ABDOMEN/PELVIS W CONTRAST, 12/24/2024 4:36  PM    TECHNIQUE:  Helical CT images from the thoracic inlet through the  symphysis pubis were obtained with IV contrast.   Contrast dose: 96 mL [id number]    COMPARISON: Same day chest and abdominal radiographs, CT abdomen and  pelvis 12/20/2024, CT chest abdomen pelvis 2/6/2023.    HISTORY: POD#4 from ex lap, SBR for SBO. septic appearing, increased  WOB, lethargic. assess for infectious etiology    FINDINGS:  Lines and Tubes: None.    CHEST:  THYROID: Thyroid is unremarkable.    MEDIASTINUM: Heart size is within normal limits. No pericardial  effusion. Mild coronary vessel calcifications atherosclerosis  prominently of the left anterior descending. Normal caliber of the  aorta and pulmonary arteries. No mediastinal lymphadenopathy.    CHEST WALL: No suspicious axillary lymphadenopathy.    LUNGS/PLEURA: Small to moderate right pleural effusion. Compressive  atelectasis throughout the right lower lobe. Partial consolidation in  the left lower lobe with trace effusion. Ground glass pulmonary  opacities in the upper lobes bilaterally. 0.7 x 0.7 cm solid pulmonary  nodule in the right middle lobe (series 6, image 119), previously  represented on images from 2/6/2023 as a groundglass nodule measuring  approximately 0.5 x 0.4 cm. No pneumothorax. Central tracheobronchial  tree is patent.    ABDOMEN/PELVIS:  LIVER: No focal hepatic mass.    BILIARY: Cholelithiasis. No evidence of acute cholecystitis. No  biliary dilation.    PANCREAS: No focal pancreatic mass or ductal dilation.    SPLEEN: No splenic mass.    ADRENAL GLANDS: Within normal limits.    URINARY TRACT: No suspicious renal mass, renal calculi, or  hydronephrosis. No focal bladder wall thickening.    REPRODUCTIVE ORGANS: No suspicious pelvic mass. Implanted penile pump.    STOMACH: Within normal limits.    BOWEL: Dilated loop of small bowel in the central abdomen near  anastomosis which measures 5.1 cm in diameter with upstream transition  point anteriorly.  Dilatation of the transverse colon measuring up to  5.6 cm, likely due to ileus. The appendix is surgically absent.    PERITONEUM/FLUID: Free fluid throughout the abdomen and pelvis. Free  air along the liver. Scattered pockets of free air elsewhere.    VESSELS: No aneurysmal dilatation of the abdominal aorta.  The portal,  splenic, and superior mesenteric veins are patent.  The origins of the  celiac and superior mesenteric arteries are patent.    LYMPH NODES: Prominent central mesenteric lymph nodes do not meet size  criteria for enlargement.    BONES/SOFT TISSUES: Soft tissues are unremarkable. No acute osseous  abnormalities or aggressive osseous lesions.       Impression    IMPRESSION:  1. New small to moderate volume of free fluid with free air in the  abdomen is concerning for anastomotic leak in the setting of a short  segment of small bowel dilatation and edema near the surgical  anastomosis.  2. Multifocal groundglass opacities in the lungs are worrisome for  infection. Additionally, there is a small to moderate right pleural  effusion with significant compressive atelectasis of the right lower  lobe, and a small consolidated density in the left lower lobe.    [Urgent Result: Free fluid and free air in the abdomen concerning for  bowel rupture]    Finding was identified on 12/24/2024 4:55PM.     Eloina Jama and Neris Kati were contacted by Dr. Mahesh Ha at 12/24/2024 4:59 PM and verbalized understanding of the urgent  finding.     I have personally reviewed the examination and initial interpretation  and I agree with the findings.    BORIS PAYTON MD         SYSTEM ID:  [id number]   CT Head w/o Contrast    Narrative    CT HEAD W/O CONTRAST 12/24/2024 4:37 PM    History: POD#4 from ex lap, SBR for SBO. septic appearing, increased  WOB, lethargic. assess for infectious etiology vs stroke   ICD-10:    Comparison: Head CT 11/21/2024    Technique: Using multidetector thin collimation helical  acquisition  technique, axial, coronal and sagittal CT images from the skull base  to the vertex were obtained without intravenous contrast.   (topogram) image(s) also obtained and reviewed.    Findings: There is no intracranial hemorrhage, mass effect, or midline  shift. Gray/white matter differentiation in both cerebral hemispheres  is preserved. Chronic appearing small cortical infarcts in the right  frontal convexity such as seen on series 3 image 13 which was present  back in 11/21/2024. No obvious new loss of gray-white matter  differentiation. Small hypodensity in the left basal ganglia,  unchanged. Small calcification in the right parietal cortex unchanged  Ventricles are proportionate to the cerebral sulci. The basal cisterns  are clear.    The bony calvaria and the bones of the skull base are normal.  Postoperative changes of left occipital craniotomy. The visualized  portions of the paranasal sinuses and mastoid air cells are clear.      Impression    Impression:  No acute intracranial pathology. Small parietal cortical calcification  is unchanged. Scattered few chronic appearing gray-white matter  differentiation loss in the right frontal convexity similar to prior,  likely representing chronic tiny microinfarcts. No definite acute  infarct identified.    I have personally reviewed the examination and initial interpretation  and I agree with the findings.    BRUNILDA KENNEDY MD         SYSTEM ID:  [id number]   XR Chest Port 1 View    Narrative    Exam: XR CHEST PORT 1 VIEW, 12/24/2024 11:24 PM    Comparison: 12/24/2074 at 4:25 PM    History: line placement verification    Findings:  Portable AP view of the supine chest. Endotracheal tube is roughly 1.4  cm above the antonio. Right IJ CVC with tip in the right atrium.  Enteric tube courses beyond the field-of-view. Trachea is midline.  Cardiomediastinal silhouette is within normal limits. Bilateral  pleural effusions, right greater than left.  Pneumoperitoneum is not  visible on this examination. No acute osseous abnormalities.      Impression    Impression: Endotracheal tube is roughly 1.4 cm above the antonio.  Bilateral pleural effusions with diffuse bilateral pulmonary  opacities, right greater than left, not significantly changed from  recent CT.    I have personally reviewed the examination and initial interpretation  and I agree with the findings.    BORIS PAYTON MD         SYSTEM ID:  [id number]   XR Chest Port 1 View    Narrative    Exam: XR CHEST PORT 1 VIEW, 12/25/2024 5:27 AM    Comparison: 12/24/2024    History: Monitor pulmonary opacities    Findings:  Portable AP view of the semiupright chest. Endotracheal tube tip is  roughly 1.6 cm above the antonio. Right IJ CVC with tip at the  cavoatrial junction.     Low lung volumes. Trachea is midline. Cardiomediastinal silhouette is  stable . No significant change in perihilar opacities. No  pneumothorax. Decreased size of bilateral pleural effusions. The  visualized upper abdomen is unremarkable. No acute osseous  abnormalities.      Impression    Impression:   1. Endotracheal tube is roughly 1.6 cm above the antonio.  2. Decreased size of bilateral pleural effusions. No significant  change in perihilar opacities.    I have personally reviewed the examination and initial interpretation  and I agree with the findings.    BORIS PAYTON MD         SYSTEM ID:  [id number]

## 2024-12-25 NOTE — PROGRESS NOTES
Brief Medicine Sign-off Note     Patient not seen. Transferred to SICU following ex-lap. Will sign off as medicine service does not follow patients at ICU level of care. SICU service notified and may reach out to medicine service to follow on transfer to floor as desired.    Eloina Jama MD

## 2024-12-25 NOTE — PLAN OF CARE
Major Shift Events:  Pt sedated on propofol, arouse to voice/touch, follows commands, RIVAS. Bilateral wrist restraints applied for line pulling. Tmax 101.1, ice pack and fan applied, MD aware, blood cultures ordered. ST, rates 100s-110s. MAP goal>65 maintained without intervention. CMV setting 40%/450/12/5. NG to LIS. Insulin gtt on alg 3. Potts in place with adequate UOP. NS @ 125 ml/hr.    Plan: Continue with plan of care.    For vital signs and complete assessments, please see documentation flowsheets.     Goal Outcome Evaluation:      Plan of Care Reviewed With: patient    Overall Patient Progress: no changeOverall Patient Progress: no change         Problem: Adult Inpatient Plan of Care  Goal: Plan of Care Review  Description: The Plan of Care Review/Shift note should be completed every shift.  The Outcome Evaluation is a brief statement about your assessment that the patient is improving, declining, or no change.  This information will be displayed automatically on your shift  note.  Outcome: Not Progressing  Flowsheets (Taken 12/25/2024 0607)  Plan of Care Reviewed With: patient  Overall Patient Progress: no change

## 2024-12-25 NOTE — ANESTHESIA PROCEDURE NOTES
Arterial Line Procedure Note    Pre-Procedure   Staff -        Anesthesiologist:  Sommer Larios MD       Resident/Fellow: Osbaldo Renee MD       Performed By: resident and with residents       Procedure performed by resident/fellow/CRNA in presence of a teaching physician.         Location: OR       Pre-Anesthestic Checklist: patient identified, IV checked, risks and benefits discussed, informed consent, monitors and equipment checked, pre-op evaluation and at physician/surgeon's request  Timeout:       Correct Patient: Yes        Correct Procedure: Yes        Correct Site: Yes        Correct Position: Yes   Line Placement:   This line was placed Pre Induction starting at 12/24/2024 7:00 PM and ending at 12/24/2024 7:05 PM  Procedure   Procedure: arterial line       Laterality: left       Insertion Site: radial.  Sterile Prep        Standard elements of sterile barrier followed       Skin prep: Chloraprep  Insertion/Injection        Technique: ultrasound guided and Seldinger Technique        1. Ultrasound was used to evaluate the access site.       2. Artery evaluated via ultrasound for patency/adequacy.       3. Using real-time ultrasound the needle/catheter was observed entering the artery/vein.       5. The visualized structures were anatomically normal.       Catheter Type/Size: 20 G, 1.75 in/4.5 cm quick cath (integral wire)  Narrative         Secured by: suture       Tegaderm dressing used.       Complications: None apparent,        Arterial waveform: Yes        IBP within 10% of NIBP: Yes

## 2024-12-25 NOTE — ANESTHESIA CARE TRANSFER NOTE
Patient: Andrew Collier    Procedure: Procedure(s):  EXPLORATORY LAPAROTOMY, SMALL BOWEL RESECTION AND REANASTOMOSIS       Diagnosis: Small bowel obstruction (H) [K56.609]  Small bowel anastomotic leak [K91.89]  Diagnosis Additional Information: No value filed.    Anesthesia Type:   General     Note:    Oropharynx: oropharynx clear of all foreign objects, oral airway in place, endotracheal tube in place, nasal gatric tube in place and spontaneously breathing  Level of Consciousness: unresponsive      Independent Airway: airway patency not satisfactory and stable  Dentition: dentition unchanged      Patient transferred to: PACU  Comments: Patient was not following commands and was a high aspiration risk, therefore the decision was made to keep the patient intubated on transfer to PACU. Transfer was completed with ambu bag on 4L with continuous monitoring.  Handoff Report: Identifed the Patient, Identified the Reponsible Provider, Reviewed the pertinent medical history, Discussed the surgical course, Reviewed Intra-OP anesthesia mangement and issues during anesthesia, Set expectations for post-procedure period and Allowed opportunity for questions and acknowledgement of understanding      Vitals:  Vitals Value Taken Time   /82 12/24/24 2230   Temp     Pulse 106 12/24/24 2241   Resp 14 12/24/24 2241   SpO2 98 % 12/24/24 2241   Vitals shown include unfiled device data.    Electronically Signed By: Osbaldo Renee MD  December 24, 2024  10:42 PM

## 2024-12-25 NOTE — ANESTHESIA POSTPROCEDURE EVALUATION
Patient: Andrew Collier    Procedure: Procedure(s):  EXPLORATORY LAPAROTOMY, SMALL BOWEL RESECTION AND REANASTOMOSIS       Anesthesia Type:  General    Note:  Disposition: ICU            ICU Sign Out: Anesthesiologist/ICU physician sign out WAS performed   Postop Pain Control: Uneventful            Sign Out: Well controlled pain   PONV: No   Neuro/Psych:             Sign Out: PLANNED postop sedation (Patient lethargic, not following commands or responding appropriately preoperatively in the setting of sepsis. Decision to remain intubated after not meeting extubation criteria - not following commands.)   Airway/Respiratory:             Sign Out: AIRWAY IN SITU/Resp. Support               Airway in situ/Resp. Support: ETT                 Reason: Planned Pre-op   CV/Hemodynamics: Uneventful            Sign Out: Acceptable CV status; No obvious hypovolemia; No obvious fluid overload   Other NRE: NONE   DID A NON-ROUTINE EVENT OCCUR? No    Event details/Postop Comments:  Patient was lethargic, not following commands, not responsive to questions prior to OR in the setting of sepsis. Patient not meeting all extubation criteria (not responding to commands) - hemodynamically stable without pressors, minimal vent settings. Head CT from 1630 pm without acute intracranial pathology.             Last vitals:  Vitals Value Taken Time   /80 12/24/24 2245   Temp     Pulse 106 12/24/24 2255   Resp 18 12/24/24 2255   SpO2 98 % 12/24/24 2255   Vitals shown include unfiled device data.    Electronically Signed By: Marti Peace MD  December 24, 2024  10:56 PM

## 2024-12-26 ENCOUNTER — APPOINTMENT (OUTPATIENT)
Dept: GENERAL RADIOLOGY | Facility: CLINIC | Age: 59
End: 2024-12-26
Payer: COMMERCIAL

## 2024-12-26 LAB
ALBUMIN SERPL BCG-MCNC: 2 G/DL (ref 3.5–5.2)
ALP SERPL-CCNC: 65 U/L (ref 40–150)
ALT SERPL W P-5'-P-CCNC: 11 U/L (ref 0–70)
ANION GAP SERPL CALCULATED.3IONS-SCNC: 8 MMOL/L (ref 7–15)
AST SERPL W P-5'-P-CCNC: 59 U/L (ref 0–45)
ATRIAL RATE - MUSE: 126 BPM
ATRIAL RATE - MUSE: 143 BPM
BACTERIA BLD CULT: ABNORMAL
BACTERIA BLD CULT: NORMAL
BACTERIA PRT CULT: ABNORMAL
BILIRUB DIRECT SERPL-MCNC: 0.25 MG/DL (ref 0–0.3)
BILIRUB SERPL-MCNC: 0.5 MG/DL
BUN SERPL-MCNC: 42.8 MG/DL (ref 8–23)
CALCIUM SERPL-MCNC: 8.1 MG/DL (ref 8.8–10.4)
CHLORIDE SERPL-SCNC: 122 MMOL/L (ref 98–107)
CREAT SERPL-MCNC: 0.95 MG/DL (ref 0.67–1.17)
DIASTOLIC BLOOD PRESSURE - MUSE: NORMAL MMHG
DIASTOLIC BLOOD PRESSURE - MUSE: NORMAL MMHG
EGFRCR SERPLBLD CKD-EPI 2021: >90 ML/MIN/1.73M2
ENTEROCOCCUS FAECALIS: NOT DETECTED
ENTEROCOCCUS FAECIUM: NOT DETECTED
ERYTHROCYTE [DISTWIDTH] IN BLOOD BY AUTOMATED COUNT: 14.9 % (ref 10–15)
GLUCOSE BLDC GLUCOMTR-MCNC: 103 MG/DL (ref 70–99)
GLUCOSE BLDC GLUCOMTR-MCNC: 106 MG/DL (ref 70–99)
GLUCOSE BLDC GLUCOMTR-MCNC: 107 MG/DL (ref 70–99)
GLUCOSE BLDC GLUCOMTR-MCNC: 109 MG/DL (ref 70–99)
GLUCOSE BLDC GLUCOMTR-MCNC: 112 MG/DL (ref 70–99)
GLUCOSE BLDC GLUCOMTR-MCNC: 118 MG/DL (ref 70–99)
GLUCOSE BLDC GLUCOMTR-MCNC: 119 MG/DL (ref 70–99)
GLUCOSE BLDC GLUCOMTR-MCNC: 121 MG/DL (ref 70–99)
GLUCOSE BLDC GLUCOMTR-MCNC: 128 MG/DL (ref 70–99)
GLUCOSE BLDC GLUCOMTR-MCNC: 129 MG/DL (ref 70–99)
GLUCOSE BLDC GLUCOMTR-MCNC: 131 MG/DL (ref 70–99)
GLUCOSE BLDC GLUCOMTR-MCNC: 139 MG/DL (ref 70–99)
GLUCOSE BLDC GLUCOMTR-MCNC: 148 MG/DL (ref 70–99)
GLUCOSE BLDC GLUCOMTR-MCNC: 151 MG/DL (ref 70–99)
GLUCOSE BLDC GLUCOMTR-MCNC: 165 MG/DL (ref 70–99)
GLUCOSE BLDC GLUCOMTR-MCNC: 167 MG/DL (ref 70–99)
GLUCOSE BLDC GLUCOMTR-MCNC: 173 MG/DL (ref 70–99)
GLUCOSE BLDC GLUCOMTR-MCNC: 97 MG/DL (ref 70–99)
GLUCOSE BLDC GLUCOMTR-MCNC: 99 MG/DL (ref 70–99)
GLUCOSE SERPL-MCNC: 156 MG/DL (ref 70–99)
HCO3 SERPL-SCNC: 19 MMOL/L (ref 22–29)
HCT VFR BLD AUTO: 28.5 % (ref 40–53)
HGB BLD-MCNC: 9.1 G/DL (ref 13.3–17.7)
INR PPP: 1.16 (ref 0.85–1.15)
INTERPRETATION ECG - MUSE: NORMAL
INTERPRETATION ECG - MUSE: NORMAL
LISTERIA SPECIES (DETECTED/NOT DETECTED): NOT DETECTED
Lab: NORMAL
MAGNESIUM SERPL-MCNC: 2.3 MG/DL (ref 1.7–2.3)
MCH RBC QN AUTO: 28.2 PG (ref 26.5–33)
MCHC RBC AUTO-ENTMCNC: 31.9 G/DL (ref 31.5–36.5)
MCV RBC AUTO: 88 FL (ref 78–100)
P AXIS - MUSE: 17 DEGREES
P AXIS - MUSE: 19 DEGREES
PATH REPORT.COMMENTS IMP SPEC: NORMAL
PATH REPORT.COMMENTS IMP SPEC: NORMAL
PATH REPORT.FINAL DX SPEC: NORMAL
PATH REPORT.GROSS SPEC: NORMAL
PATH REPORT.MICROSCOPIC SPEC OTHER STN: NORMAL
PATH REPORT.RELEVANT HX SPEC: NORMAL
PERFORMING LABORATORY: NORMAL
PHOSPHATE SERPL-MCNC: 2.1 MG/DL (ref 2.5–4.5)
PHOTO IMAGE: NORMAL
PLATELET # BLD AUTO: 157 10E3/UL (ref 150–450)
POTASSIUM SERPL-SCNC: 3.5 MMOL/L (ref 3.4–5.3)
PR INTERVAL - MUSE: 120 MS
PR INTERVAL - MUSE: 122 MS
PROT SERPL-MCNC: 5 G/DL (ref 6.4–8.3)
QRS DURATION - MUSE: 80 MS
QRS DURATION - MUSE: 94 MS
QT - MUSE: 298 MS
QT - MUSE: 304 MS
QTC - MUSE: 440 MS
QTC - MUSE: 459 MS
R AXIS - MUSE: -22 DEGREES
R AXIS - MUSE: -37 DEGREES
RBC # BLD AUTO: 3.23 10E6/UL (ref 4.4–5.9)
SODIUM SERPL-SCNC: 149 MMOL/L (ref 135–145)
SPECIMEN STATUS: NORMAL
STAPHYLOCOCCUS AUREUS: NOT DETECTED
STAPHYLOCOCCUS EPIDERMIDIS: NOT DETECTED
STAPHYLOCOCCUS LUGDUNENSIS: NOT DETECTED
STAPHYLOCOCCUS SPECIES: NOT DETECTED
STREPTOCOCCUS AGALACTIAE: NOT DETECTED
STREPTOCOCCUS ANGINOSUS GROUP: NOT DETECTED
STREPTOCOCCUS PNEUMONIAE: NOT DETECTED
STREPTOCOCCUS PYOGENES: NOT DETECTED
STREPTOCOCCUS SPECIES: NOT DETECTED
SYSTOLIC BLOOD PRESSURE - MUSE: NORMAL MMHG
SYSTOLIC BLOOD PRESSURE - MUSE: NORMAL MMHG
T AXIS - MUSE: -3 DEGREES
T AXIS - MUSE: 23 DEGREES
TEST NAME: NORMAL
VENTRICULAR RATE- MUSE: 126 BPM
VENTRICULAR RATE- MUSE: 143 BPM
WBC # BLD AUTO: 5.8 10E3/UL (ref 4–11)

## 2024-12-26 PROCEDURE — 250N000009 HC RX 250: Performed by: SURGERY

## 2024-12-26 PROCEDURE — 250N000011 HC RX IP 250 OP 636

## 2024-12-26 PROCEDURE — 36415 COLL VENOUS BLD VENIPUNCTURE: CPT | Performed by: NURSE PRACTITIONER

## 2024-12-26 PROCEDURE — 272N000278 HC DEVICE 5FR SECURACATH

## 2024-12-26 PROCEDURE — 250N000011 HC RX IP 250 OP 636: Performed by: STUDENT IN AN ORGANIZED HEALTH CARE EDUCATION/TRAINING PROGRAM

## 2024-12-26 PROCEDURE — 94003 VENT MGMT INPAT SUBQ DAY: CPT

## 2024-12-26 PROCEDURE — 250N000009 HC RX 250: Performed by: INTERNAL MEDICINE

## 2024-12-26 PROCEDURE — 250N000013 HC RX MED GY IP 250 OP 250 PS 637: Performed by: STUDENT IN AN ORGANIZED HEALTH CARE EDUCATION/TRAINING PROGRAM

## 2024-12-26 PROCEDURE — 272N000452 HC KIT SHRLOCK 5FR POWER PICC TRIPLE LUMEN

## 2024-12-26 PROCEDURE — 71045 X-RAY EXAM CHEST 1 VIEW: CPT | Mod: 26 | Performed by: RADIOLOGY

## 2024-12-26 PROCEDURE — 200N000002 HC R&B ICU UMMC

## 2024-12-26 PROCEDURE — 250N000009 HC RX 250: Performed by: NURSE PRACTITIONER

## 2024-12-26 PROCEDURE — 84450 TRANSFERASE (AST) (SGOT): CPT | Performed by: SURGERY

## 2024-12-26 PROCEDURE — 80076 HEPATIC FUNCTION PANEL: CPT | Performed by: SURGERY

## 2024-12-26 PROCEDURE — 87040 BLOOD CULTURE FOR BACTERIA: CPT | Performed by: NURSE PRACTITIONER

## 2024-12-26 PROCEDURE — 82310 ASSAY OF CALCIUM: CPT | Performed by: SURGERY

## 2024-12-26 PROCEDURE — 85048 AUTOMATED LEUKOCYTE COUNT: CPT | Performed by: STUDENT IN AN ORGANIZED HEALTH CARE EDUCATION/TRAINING PROGRAM

## 2024-12-26 PROCEDURE — 85610 PROTHROMBIN TIME: CPT | Performed by: SURGERY

## 2024-12-26 PROCEDURE — 85018 HEMOGLOBIN: CPT | Performed by: STUDENT IN AN ORGANIZED HEALTH CARE EDUCATION/TRAINING PROGRAM

## 2024-12-26 PROCEDURE — 250N000013 HC RX MED GY IP 250 OP 250 PS 637: Performed by: SURGERY

## 2024-12-26 PROCEDURE — 258N000003 HC RX IP 258 OP 636: Performed by: INTERNAL MEDICINE

## 2024-12-26 PROCEDURE — 99291 CRITICAL CARE FIRST HOUR: CPT | Mod: GC | Performed by: SURGERY

## 2024-12-26 PROCEDURE — 85049 AUTOMATED PLATELET COUNT: CPT | Performed by: STUDENT IN AN ORGANIZED HEALTH CARE EDUCATION/TRAINING PROGRAM

## 2024-12-26 PROCEDURE — 999N000248 HC STATISTIC IV INSERT WITH US BY RN

## 2024-12-26 PROCEDURE — 258N000003 HC RX IP 258 OP 636: Performed by: NURSE PRACTITIONER

## 2024-12-26 PROCEDURE — 36569 INSJ PICC 5 YR+ W/O IMAGING: CPT

## 2024-12-26 PROCEDURE — 36592 COLLECT BLOOD FROM PICC: CPT

## 2024-12-26 PROCEDURE — 83735 ASSAY OF MAGNESIUM: CPT | Performed by: SURGERY

## 2024-12-26 PROCEDURE — 999N000157 HC STATISTIC RCP TIME EA 10 MIN

## 2024-12-26 PROCEDURE — 250N000009 HC RX 250

## 2024-12-26 PROCEDURE — B4185 PARENTERAL SOL 10 GM LIPIDS: HCPCS | Performed by: SURGERY

## 2024-12-26 PROCEDURE — 84100 ASSAY OF PHOSPHORUS: CPT | Performed by: SURGERY

## 2024-12-26 PROCEDURE — 99233 SBSQ HOSP IP/OBS HIGH 50: CPT | Performed by: INTERNAL MEDICINE

## 2024-12-26 PROCEDURE — 250N000011 HC RX IP 250 OP 636: Performed by: SURGERY

## 2024-12-26 PROCEDURE — 71045 X-RAY EXAM CHEST 1 VIEW: CPT

## 2024-12-26 PROCEDURE — 250N000011 HC RX IP 250 OP 636: Performed by: NURSE PRACTITIONER

## 2024-12-26 PROCEDURE — 250N000011 HC RX IP 250 OP 636: Performed by: INTERNAL MEDICINE

## 2024-12-26 PROCEDURE — 82248 BILIRUBIN DIRECT: CPT | Performed by: SURGERY

## 2024-12-26 RX ORDER — POTASSIUM CHLORIDE 7.45 MG/ML
10 INJECTION INTRAVENOUS
Status: COMPLETED | OUTPATIENT
Start: 2024-12-26 | End: 2024-12-26

## 2024-12-26 RX ORDER — POTASSIUM PHOS IN 0.9 % NACL 15MMOL/250
15 PLASTIC BAG, INJECTION (ML) INTRAVENOUS ONCE
Status: COMPLETED | OUTPATIENT
Start: 2024-12-26 | End: 2024-12-26

## 2024-12-26 RX ORDER — HEPARIN SODIUM,PORCINE 10 UNIT/ML
5-20 VIAL (ML) INTRAVENOUS EVERY 24 HOURS
Status: ACTIVE | OUTPATIENT
Start: 2024-12-26

## 2024-12-26 RX ORDER — LIDOCAINE 40 MG/G
CREAM TOPICAL
Status: ACTIVE | OUTPATIENT
Start: 2024-12-26 | End: 2024-12-29

## 2024-12-26 RX ORDER — HEPARIN SODIUM,PORCINE 10 UNIT/ML
5-20 VIAL (ML) INTRAVENOUS
Status: ACTIVE | OUTPATIENT
Start: 2024-12-26

## 2024-12-26 RX ORDER — DEXTROSE MONOHYDRATE AND SODIUM CHLORIDE 5; .45 G/100ML; G/100ML
INJECTION, SOLUTION INTRAVENOUS CONTINUOUS
Status: DISCONTINUED | OUTPATIENT
Start: 2024-12-26 | End: 2024-12-26

## 2024-12-26 RX ORDER — OLANZAPINE 10 MG/1
5 INJECTION, POWDER, LYOPHILIZED, FOR SOLUTION INTRAMUSCULAR EVERY 8 HOURS PRN
Status: DISPENSED | OUTPATIENT
Start: 2024-12-26

## 2024-12-26 RX ADMIN — DEXMEDETOMIDINE HYDROCHLORIDE 0.4 MCG/KG/HR: 400 INJECTION INTRAVENOUS at 05:17

## 2024-12-26 RX ADMIN — HYDROMORPHONE HYDROCHLORIDE 0.4 MG: 0.2 INJECTION, SOLUTION INTRAMUSCULAR; INTRAVENOUS; SUBCUTANEOUS at 07:06

## 2024-12-26 RX ADMIN — PANTOPRAZOLE SODIUM 40 MG: 40 INJECTION, POWDER, FOR SOLUTION INTRAVENOUS at 09:09

## 2024-12-26 RX ADMIN — DEXMEDETOMIDINE HYDROCHLORIDE 0.7 MCG/KG/HR: 400 INJECTION INTRAVENOUS at 12:14

## 2024-12-26 RX ADMIN — POTASSIUM CHLORIDE 10 MEQ: 7.46 INJECTION, SOLUTION INTRAVENOUS at 06:19

## 2024-12-26 RX ADMIN — POTASSIUM PHOSPHATE, MONOBASIC POTASSIUM PHOSPHATE, DIBASIC 15 MMOL: 236; 224 INJECTION, SOLUTION INTRAVENOUS at 10:15

## 2024-12-26 RX ADMIN — LIDOCAINE 4% 1 PATCH: 40 PATCH TOPICAL at 05:18

## 2024-12-26 RX ADMIN — HYDROMORPHONE HYDROCHLORIDE 0.4 MG: 0.2 INJECTION, SOLUTION INTRAMUSCULAR; INTRAVENOUS; SUBCUTANEOUS at 04:12

## 2024-12-26 RX ADMIN — ENOXAPARIN SODIUM 40 MG: 40 INJECTION SUBCUTANEOUS at 20:25

## 2024-12-26 RX ADMIN — MEROPENEM 1 G: 1 INJECTION, POWDER, FOR SOLUTION INTRAVENOUS at 16:31

## 2024-12-26 RX ADMIN — OLANZAPINE 5 MG: 10 INJECTION, POWDER, LYOPHILIZED, FOR SOLUTION INTRAMUSCULAR at 13:27

## 2024-12-26 RX ADMIN — HYDROMORPHONE HYDROCHLORIDE 0.4 MG: 0.2 INJECTION, SOLUTION INTRAMUSCULAR; INTRAVENOUS; SUBCUTANEOUS at 22:18

## 2024-12-26 RX ADMIN — FAMOTIDINE 20 MG: 10 INJECTION, SOLUTION INTRAVENOUS at 08:11

## 2024-12-26 RX ADMIN — POTASSIUM CHLORIDE 10 MEQ: 7.46 INJECTION, SOLUTION INTRAVENOUS at 08:02

## 2024-12-26 RX ADMIN — OLIVE OIL AND SOYBEAN OIL 250 ML: 16; 4 INJECTION, EMULSION INTRAVENOUS at 20:40

## 2024-12-26 RX ADMIN — DEXTROSE AND SODIUM CHLORIDE: 5; 450 INJECTION, SOLUTION INTRAVENOUS at 09:14

## 2024-12-26 RX ADMIN — MEROPENEM 1 G: 1 INJECTION, POWDER, FOR SOLUTION INTRAVENOUS at 00:59

## 2024-12-26 RX ADMIN — DEXMEDETOMIDINE HYDROCHLORIDE 0.7 MCG/KG/HR: 400 INJECTION INTRAVENOUS at 19:14

## 2024-12-26 RX ADMIN — MEROPENEM 1 G: 1 INJECTION, POWDER, FOR SOLUTION INTRAVENOUS at 09:07

## 2024-12-26 ASSESSMENT — ACTIVITIES OF DAILY LIVING (ADL)
ADLS_ACUITY_SCORE: 42

## 2024-12-26 NOTE — PROGRESS NOTES
"Extubated per MD order, no immediate complications during extubation. Currently on 5L oxygen and comfortable. No further respiratory suggestions at this time.    BP (!) 83/63   Pulse 72   Temp 97.4  F (36.3  C) (Axillary)   Resp 18   Ht 1.626 m (5' 4\")   Wt 83.4 kg (183 lb 13.8 oz)   SpO2 100%   BMI 31.56 kg/m      Andrew Justice, RT on 12/26/2024 at 5:30 AM     "

## 2024-12-26 NOTE — PROGRESS NOTES
General Infectious Disease Service - Green Team    Patient:  Andrew Collier, Date of birth 1965, Medical record number [id number]  Date of Admission: 12/20/2024  Date of Visit:  12/26/2024         Assessment and Recommendations:   Problem List:    # Streptococcus species bacteremia     # SBO s/p ex lap with small bowel stricture resection and primary anastomosis -> complicated with anastomotic leak and underwent new laparotomy on 12/24 exploratory laparotomy and small bowel resection - peritoneal fluid with polymicrobial growth      # HIV in ARV     Discussion:     Mr/. Andrew Collier is a 59 year old male with history significant for recurrent SBO, poorly controlled T2DM, HIV on ART/biktarvy (followed by Dr. Ivory in clinic - CD4 from 11/5/24 was 432 and HIV VL from 12/12/24 was 153) and recent admission for SBO managed conservatively with decompression and gastrograffin challenge who presented to the ED on 12/18 with abdominal pain. CT imaging with isolated segment of dilated bowel concerning for closed loop obstruction; patient was taken to the OR on 12/20/2024 for ex lap with small bowel stricture resection and primary anastomosis.  Postoperatively patient was transferred to floor, where he had continued abdominal pain, bloating, and tachycardia.  A rapid response was called morning of 12/24 for ongoing tachycardia and altered mental status, along with notable leukopenia and metabolic acidosis.  CT imaging was ordered and that afternoon showed dilation of the bowel proximal to the new anastomosis, with moderate volume of free fluid and free air in the abdomen concerning for anastomotic leak.  Patient was taken to the OR on the evening of 12/24 for exploratory laparotomy, small bowel resection, and anastomotic revision.  IntraOp course notable for 125 cm of bowel remaining proximally, and 115 cm remaining distally after resection.  Bowel appeared healthy IntraOp.  Patient was unable  to be extubated in PACU, primarily due to neurologic concerns, and was transferred to the SICU on minimal vent settings.  An NG tube was placed in the OR (difficult placement). CT chest also demonstrated Multifocal groundglass opacities in the lungs are worrisome for infection + small to moderate right pleural effusion.      SICU team consulted ID for review of antiretroviral treatment. Patient recently seen in clinic by Dr. Ivory (12/12/24). Biktarvy continued at that time. Report of relatively good adherence. CD4 from 11/5/24 was 432 so not need for prophylaxis for opportunistic infections. HIV VL from 12/12/24 was 153. Patient had a genotype/phenotype in 2012 and it did not show evidence of resistance. The viral load of 153 likely translates intermittent adherence. It is probably to low of a viral load for a new genotype test but worth to order it. Patient is now intubated and sedated, but NG tube placed on 12/25. If pharmacy does not identify any medication interaction and does not encounter any impediment in administering biktegravir through NG tube,I would continue biktarvy at this moment.      Patient developed fever on 12/24 (101.1 F) and leukopenia of 2.4. Noted that the patient underwent re-do laparotomy on 12/24 with bowel resection. White count 3.9 today. He was started on zosyn on 12/24. Blood culture from 12/24 positive for Streptococcus species in 1 bottle. Repeat blood culture from 12/25 negative to date. Peritoneal fluid culture  (intra op?) from 12/24 positive for Enterobacter cloacae, Citrobacter freundii, Streptococcus gallolyticus (bovis group), Streptococcus anginosus, Clostridium perfrigens.  Patient in the surgical ICU setting, intubated and on MV 40% and sedated. He is not on vasopressors. Zosyn transitioned to meropenem on 12/25/24. All organisms above should be covered by meropenem. Patient is afebrile today. He was extubated today and he is on O2 by nasal cannula. Not on vasopressors.       Recommendations:     Please continue meropenem 1 gram IV q 8h     Streptococcus gallolyticus growth in blood culture (12/24) in one bottle. Possible that this is from polymicrobial abdominal source. Multiple organisms growning in peritoneal fluid (listed above in discussion) but all should be covered by meropenem.     Noted Gram positive bacilli in in blood culture (reported today). I called the lab and they informed me that it grew in the anaerobic bottle. I therefore suspect it is an anaerobic organisms that should be covered by meropenem. I do not feel inclined to add vancomycin at this moment, however this can be performed if fever, hemodynamic instability or clinical deterioration.      Will continue to monitor blood cultures (repeat blood culture from 12/25 in process). If any additional growth  will consider TTE. Will also monitor peritoneal cultures/susceptibilities and adjust antibiotics accordingly     Please obtain new blood culture tomorrow (12/26)     In regards of HIV management, patient recently seen in clinic by Dr. Ivory (12/12/24). Biktarvy continued at that time. Report of relatively good adherence. CD4 from 11/5/24 was 432 so not need for prophylaxis for opportunistic infections. HIV VL from 12/12/24 was 153. Patient had a genotype/phenotype in 2015 and it did not show evidence of resistance. The viral load of 153 likely translates intermittent adherence. It is probably too low of a viral load for a new genotype test but worth to order it (I ordered it for you). Patient extubated today and he has a  NG tube placed on 12/25. If pharmacy does not identify any medication interaction and does not encounter any impediment in administering biktegravir through NG tube, I would continue biktarvy at this moment.        The General ID team will continue to follow this patient. Please feel free to call with any questions.     ROSELINE SALEH MD  Date of Service:  12/26/24  Pager:  "[phone]           Physical Exam:   BP (!) 83/63   Pulse 74   Temp 97.6  F (36.4  C) (Axillary)   Resp 17   Ht 1.626 m (5' 4\")   Wt 83.4 kg (183 lb 13.8 oz)   SpO2 100%   BMI 31.56 kg/m         Exam:  Physical exam limited because ICU team was performing procedure  HEAD: Normocephalic and atraumatic  ENT:  No hearing impairment  EYES:  Eyes grossly normal to inspection  LUNGS:  Extubated  CARDIOVASCULAR:  Not on vasopressors         Laboratory Data:     Creatinine   Date Value Ref Range Status   12/26/2024 0.95 0.67 - 1.17 mg/dL Final   12/25/2024 1.22 (H) 0.67 - 1.17 mg/dL Final   12/24/2024 1.29 (H) 0.67 - 1.17 mg/dL Final   12/24/2024 1.44 (H) 0.67 - 1.17 mg/dL Final   12/24/2024 1.83 (H) 0.67 - 1.17 mg/dL Final   06/24/2021 0.81 0.66 - 1.25 mg/dL Final   06/15/2021 0.79 0.66 - 1.25 mg/dL Final   03/10/2021 1.02 0.66 - 1.25 mg/dL Final   01/21/2021 0.91 0.66 - 1.25 mg/dL Final   01/10/2021 0.75 0.66 - 1.25 mg/dL Final     WBC   Date Value Ref Range Status   06/24/2021 9.6 4.0 - 11.0 10e9/L Final   06/15/2021 7.4 4.0 - 11.0 10e9/L Final   03/10/2021 12.0 (H) 4.0 - 11.0 10e9/L Final   01/21/2021 9.6 4.0 - 11.0 10e9/L Final   01/10/2021 9.2 4.0 - 11.0 10e9/L Final     WBC Count   Date Value Ref Range Status   12/26/2024 5.8 4.0 - 11.0 10e3/uL Final   12/25/2024 3.9 (L) 4.0 - 11.0 10e3/uL Final   12/24/2024 2.4 (L) 4.0 - 11.0 10e3/uL Final   12/24/2024 3.7 (L) 4.0 - 11.0 10e3/uL Final   12/24/2024 2.4 (L) 4.0 - 11.0 10e3/uL Final   12/24/2024 2.4 (L) 4.0 - 11.0 10e3/uL Final     Hemoglobin   Date Value Ref Range Status   12/26/2024 9.1 (L) 13.3 - 17.7 g/dL Final   06/24/2021 13.5 13.3 - 17.7 g/dL Final     Platelet Count   Date Value Ref Range Status   12/26/2024 157 150 - 450 10e3/uL Final   06/24/2021 386 150 - 450 10e9/L Final     Lab Results   Component Value Date     (H) 12/26/2024    BUN 42.8 (H) 12/26/2024    CO2 19 (L) 12/26/2024     CRP Inflammation   Date Value Ref Range Status   02/19/2022 5.6 0.0 " "- 8.0 mg/L Final   02/18/2022 4.1 0.0 - 8.0 mg/L Final   01/22/2022 34.0 (H) 0.0 - 8.0 mg/L Final   10/22/2021 <2.9 0.0 - 8.0 mg/L Final   12/19/2020 7.2 0.0 - 8.0 mg/L Final   02/26/2020 7.9 0.0 - 8.0 mg/L Final   02/25/2020 17.0 (H) 0.0 - 8.0 mg/L Final   02/14/2020 5.0 0.0 - 8.0 mg/L Final   10/08/2018 <2.9 0.0 - 8.0 mg/L Final           Pertinent Recent Microbiology Data:   No results for input(s): \"CULT\", \"SDES\" in the last 168 hours.         Imaging:     Recent Results (from the past 48 hours)   CT Chest/Abdomen/Pelvis w Contrast   Result Value    Radiologist flags (Urgent)     Free fluid and free air in the abdomen concerning for    Narrative    EXAMINATION: CT CHEST/ABDOMEN/PELVIS W CONTRAST, 12/24/2024 4:36 PM    TECHNIQUE:  Helical CT images from the thoracic inlet through the  symphysis pubis were obtained with IV contrast.   Contrast dose: 96 mL [id number]    COMPARISON: Same day chest and abdominal radiographs, CT abdomen and  pelvis 12/20/2024, CT chest abdomen pelvis 2/6/2023.    HISTORY: POD#4 from ex lap, SBR for SBO. septic appearing, increased  WOB, lethargic. assess for infectious etiology    FINDINGS:  Lines and Tubes: None.    CHEST:  THYROID: Thyroid is unremarkable.    MEDIASTINUM: Heart size is within normal limits. No pericardial  effusion. Mild coronary vessel calcifications atherosclerosis  prominently of the left anterior descending. Normal caliber of the  aorta and pulmonary arteries. No mediastinal lymphadenopathy.    CHEST WALL: No suspicious axillary lymphadenopathy.    LUNGS/PLEURA: Small to moderate right pleural effusion. Compressive  atelectasis throughout the right lower lobe. Partial consolidation in  the left lower lobe with trace effusion. Ground glass pulmonary  opacities in the upper lobes bilaterally. 0.7 x 0.7 cm solid pulmonary  nodule in the right middle lobe (series 6, image 119), previously  represented on images from 2/6/2023 as a groundglass nodule " measuring  approximately 0.5 x 0.4 cm. No pneumothorax. Central tracheobronchial  tree is patent.    ABDOMEN/PELVIS:  LIVER: No focal hepatic mass.    BILIARY: Cholelithiasis. No evidence of acute cholecystitis. No  biliary dilation.    PANCREAS: No focal pancreatic mass or ductal dilation.    SPLEEN: No splenic mass.    ADRENAL GLANDS: Within normal limits.    URINARY TRACT: No suspicious renal mass, renal calculi, or  hydronephrosis. No focal bladder wall thickening.    REPRODUCTIVE ORGANS: No suspicious pelvic mass. Implanted penile pump.    STOMACH: Within normal limits.    BOWEL: Dilated loop of small bowel in the central abdomen near  anastomosis which measures 5.1 cm in diameter with upstream transition  point anteriorly. Dilatation of the transverse colon measuring up to  5.6 cm, likely due to ileus. The appendix is surgically absent.    PERITONEUM/FLUID: Free fluid throughout the abdomen and pelvis. Free  air along the liver. Scattered pockets of free air elsewhere.    VESSELS: No aneurysmal dilatation of the abdominal aorta.  The portal,  splenic, and superior mesenteric veins are patent.  The origins of the  celiac and superior mesenteric arteries are patent.    LYMPH NODES: Prominent central mesenteric lymph nodes do not meet size  criteria for enlargement.    BONES/SOFT TISSUES: Soft tissues are unremarkable. No acute osseous  abnormalities or aggressive osseous lesions.       Impression    IMPRESSION:  1. New small to moderate volume of free fluid with free air in the  abdomen is concerning for anastomotic leak in the setting of a short  segment of small bowel dilatation and edema near the surgical  anastomosis.  2. Multifocal groundglass opacities in the lungs are worrisome for  infection. Additionally, there is a small to moderate right pleural  effusion with significant compressive atelectasis of the right lower  lobe, and a small consolidated density in the left lower lobe.    [Urgent Result: Free  fluid and free air in the abdomen concerning for  bowel rupture]    Finding was identified on 12/24/2024 4:55PM.     Eloina Jama and Neris Parikh were contacted by Dr. Mahesh Ha at 12/24/2024 4:59 PM and verbalized understanding of the urgent  finding.     I have personally reviewed the examination and initial interpretation  and I agree with the findings.    BORIS PAYTON MD         SYSTEM ID:  [id number]   CT Head w/o Contrast    Narrative    CT HEAD W/O CONTRAST 12/24/2024 4:37 PM    History: POD#4 from ex lap, SBR for SBO. septic appearing, increased  WOB, lethargic. assess for infectious etiology vs stroke   ICD-10:    Comparison: Head CT 11/21/2024    Technique: Using multidetector thin collimation helical acquisition  technique, axial, coronal and sagittal CT images from the skull base  to the vertex were obtained without intravenous contrast.   (topogram) image(s) also obtained and reviewed.    Findings: There is no intracranial hemorrhage, mass effect, or midline  shift. Gray/white matter differentiation in both cerebral hemispheres  is preserved. Chronic appearing small cortical infarcts in the right  frontal convexity such as seen on series 3 image 13 which was present  back in 11/21/2024. No obvious new loss of gray-white matter  differentiation. Small hypodensity in the left basal ganglia,  unchanged. Small calcification in the right parietal cortex unchanged  Ventricles are proportionate to the cerebral sulci. The basal cisterns  are clear.    The bony calvaria and the bones of the skull base are normal.  Postoperative changes of left occipital craniotomy. The visualized  portions of the paranasal sinuses and mastoid air cells are clear.      Impression    Impression:  No acute intracranial pathology. Small parietal cortical calcification  is unchanged. Scattered few chronic appearing gray-white matter  differentiation loss in the right frontal convexity similar to prior,  likely  representing chronic tiny microinfarcts. No definite acute  infarct identified.    I have personally reviewed the examination and initial interpretation  and I agree with the findings.    BRUNILDA KENNEDY MD         SYSTEM ID:  [id number]   XR Chest Port 1 View    Narrative    Exam: XR CHEST PORT 1 VIEW, 12/24/2024 11:24 PM    Comparison: 12/24/2074 at 4:25 PM    History: line placement verification    Findings:  Portable AP view of the supine chest. Endotracheal tube is roughly 1.4  cm above the antonio. Right IJ CVC with tip in the right atrium.  Enteric tube courses beyond the field-of-view. Trachea is midline.  Cardiomediastinal silhouette is within normal limits. Bilateral  pleural effusions, right greater than left. Pneumoperitoneum is not  visible on this examination. No acute osseous abnormalities.      Impression    Impression: Endotracheal tube is roughly 1.4 cm above the antonio.  Bilateral pleural effusions with diffuse bilateral pulmonary  opacities, right greater than left, not significantly changed from  recent CT.    I have personally reviewed the examination and initial interpretation  and I agree with the findings.    BORIS PAYTON MD         SYSTEM ID:  [id number]   XR Chest Port 1 View    Narrative    Exam: XR CHEST PORT 1 VIEW, 12/25/2024 5:27 AM    Comparison: 12/24/2024    History: Monitor pulmonary opacities    Findings:  Portable AP view of the semiupright chest. Endotracheal tube tip is  roughly 1.6 cm above the antonio. Right IJ CVC with tip at the  cavoatrial junction.     Low lung volumes. Trachea is midline. Cardiomediastinal silhouette is  stable . No significant change in perihilar opacities. No  pneumothorax. Decreased size of bilateral pleural effusions. The  visualized upper abdomen is unremarkable. No acute osseous  abnormalities.      Impression    Impression:   1. Endotracheal tube is roughly 1.6 cm above the antonio.  2. Decreased size of bilateral pleural effusions. No  significant  change in perihilar opacities.    I have personally reviewed the examination and initial interpretation  and I agree with the findings.    BORIS PAYTON MD         SYSTEM ID:  [id number]   XR Chest Port 1 View    Narrative    Exam: XR CHEST PORT 1 VIEW, 12/26/2024 2:13 AM    Comparison: 12/25/2024    History: Monitor pulmonary opacities    Findings:  Portable AP view of the semiupright chest. Endotracheal tube is  roughly 3 cm above the antonio. Gastric tube with tip and sidehole in  the stomach. Right IJ CVC with tip at the cavoatrial junction. Trachea  is midline. Cardiomediastinal silhouette is stable. Similar perihilar  patchy opacities. Increased right pleural effusion. No pneumothorax.  The visualized upper abdomen is unremarkable. No acute osseous  abnormalities.      Impression    Impression:   1. Slight increase in right pleural effusion. No significant change in  perihilar mixed opacities.  2. Stable position of support devices.    I have personally reviewed the examination and initial interpretation  and I agree with the findings.    BORIS PAYTON MD         SYSTEM ID:  [id number]   XR Abdomen Port 1 View    Narrative    EXAMINATION: XR ABDOMEN PORT 1 VIEW 12/26/2024 5:41 AM     COMPARISON: 12/24/2024.    HISTORY: NG confirmation    TECHNIQUE: Frontal view of the abdomen.    FINDINGS: Enteric tube with tip and sidehole within the stomach. No  abnormally dilated loops of bowel. Air-filled loop of large bowel. No  pneumatosis or portal venous gas.       Impression    IMPRESSION: Enteric tube tip and sidehole project over the stomach.    I have personally reviewed the examination and initial interpretation  and I agree with the findings.    BORIS PAYTON MD         SYSTEM ID:  [id number]

## 2024-12-26 NOTE — PROGRESS NOTES
"Emergency General Surgery Progress Note     S: Now extubated, has a lot of questions of when granados and NGT can come out.     O:  BP (!) 83/63   Pulse 73   Temp 97.7  F (36.5  C) (Oral)   Resp 29   Ht 1.626 m (5' 4\")   Wt 83.4 kg (183 lb 13.8 oz)   SpO2 100%   BMI 31.56 kg/m    Alert, answering questions appropriately, mental status similar to baseline   No increased work of breathing  Abd is soft, mildly distended, mildly tender to palpation, no peritoneal signs   - midline incision with fascia intact, very minimal s/s drainage on kerlix packing   Ext wwp, restraints in place     UOP 1.4L    ml   Net +1.2L/24h (+5L since admission)     Cr 0.95 from 1.22   WBC 5.8 from 3.9   Hgb 9.1 from 10.1     A/P: 59yoM with h/o T2DM, HIV on ART, and recent admission from 12/17-12/19 for SBO managed successfully with bowel rest/GGC who presented to ED again on 12/20 for recurrent SBO s/p ex lap/SBR on 12/20. Now s/p ex-lap/SBR/re-anastomosis for anastomotic leak on 12/24. Extubated 12/26 AM.     POD#2- Vitals and exam improved since surgery. JULIANNE and leukopenia has resolved. Dressing changed by EGS team this AM-- ok for daily and PRN dressing changes by RN staff now.     - keep NPO, NGT for now  - ok for TPN   - zosyn started 12/24 - cont for at least 4 days. High risk of abscess  - strict I/O, okay to remove granados today   - RN to change midline dressing daily & PRN with moistened kerlix and gauze/ABD (wound care ordered for you)  - dispo: if planning to transfer out of unit, EGS would prefer IMC given recent AMS that may require more support and tachycardia     Seen with chief resident who will d/w staff     Neris Parikh, DO  General Surgery, PGY-2   "

## 2024-12-26 NOTE — PROGRESS NOTES
Vascular Access Services Notes:    PICC insertion is placed on-hold until NEGATIVE blood culture result after 48 hours (from 12/24/2024 at 1552). Ordering MD & primary RN aware.        Girish Olguin, PATYN, RN [facility]  Vascular Access Services  [facility]  [phone]

## 2024-12-26 NOTE — PROGRESS NOTES
CLINICAL NUTRITION SERVICES - BRIEF NOTE     Nutrition Prescription    RECOMMENDATIONS FOR MDs/PROVIDERS TO ORDER:  Provider to manage additional IVF with TPN initiation - recommend discontinuing dextrose-containing IVF once TPN is resumed     Recommendations already ordered by Registered Dietitian (RD):  Once PICC placed this afternoon, resume TPN:   Dosing weight:  64.7 kg (adjusted wt)  Access: central,  PICC to be placed today at 4pm   Initial parameters (per day)  Volume:  960 mL or per pharmD  Dextrose: 95 g  AA: 100 g  Lipids: 250 mL 20%, 6 days per week   Dextrose titration:   Monitor lytes and if within acceptable parameters (Mg++ > or = 1.5, K+ is > or = 3, and PO4 > or = 1.9), increase dextrose by 55 g/day to goal of 260 g dextrose.  Additives: standard multivitamin and trace elements daily     Goal PN provides 260 g dextrose, 100 g AA, and (once off propofol) 250 mL 20% lipids 6 days per week for total provision of 1712 Kcals (26 Kcals/kg), 1.5 g/kg protein, GIR 2.2 mg/kg actual weight/minute, and 25% fat kcals on average daily.      Future/Additional Recommendations:  Monitor TPN related labs   Monitor GI function   Continue to monitor additional nutrition-related findings and follow pt per protocol   For last full RD assessment, see note dated 12/25/24    NEW FINDINGS   Pt extubated this morning, no longer receiving propofol infusion   TPN initiated last  night via CVC triple lumen right internal jugular, pt pulled port of right internal jugular, TPN now currently held.   PICC placement planned for today around 4pm (~48h negative blood cultures), and TPN to be resumed via PICC after placement.   Phos low this morning (2.1) - HIGH replacement protocol in place - replacing   D5W IVF w/ 1/2 NS at 75 ml/h infusing; insulin drip in place        INTERVENTIONS  Implementation  Collaboration with other providers  Parenteral Nutrition/IV Fluids - Modify composition, resume once PICC placed       Monitoring/Evaluation  Will continue to monitor and evaluate per protocol.    Ceci Lowe, MPH, RDN, LD  4E (SICU) [facility] Donaldson [4E Clinical Dietitian]   Weekend/Holiday: [employer] - Weekend Clinical Dietitian

## 2024-12-26 NOTE — PROGRESS NOTES
SURGICAL ICU PROGRESS NOTE  12/26/2024      PRIMARY TEAM: EGS  PRIMARY PHYSICIAN: Dr. Kate     REASON FOR CRITICAL CARE ADMISSION: Vent management   CONSULTING PHYSICIAN: Dr. Mathis     ASSESSMENT: 59 year old male with history significant for recurrent SBO, poorly controlled T2DM, HIV on ART, and recent closed loop obstruction s/p 12/20/2024 ex lap with small bowel stricture resection and primary anastomosis since complicated by free fluid and free air in the abdomen concerning for anastomotic leak.  Patient taken back to the OR on the evening of 12/24 for exploratory laparotomy, small bowel resection, and anastomotic revision. Patient has 125 cm of bowel remaining proximally and 115 cm remaining distally after resection.  Patient was unable to be extubated in PACU, primarily due to neurologic concerns, and was transferred to the SICU on minimal vent settings.      Updates:   - PICC today d/t loss of CVC overnight  - remove CVC  - trend blood cx  - switch to IV not IM zyprexa  - extubated early this morning to nasal cannula  - remove granados  - appreciate ID reccs, continuing meropenem   - continue TPN, remains NPO/NGT  - wound cares per RN per EGS     PLAN:  Neuro/psych/pain/sedation:  #Acute postop pain  #Altered mental status, acute   #History of anxiety and panic disorder  #History of major depression  #History of methamphetamine abuse (currently sober)  #History of housing instability (currently housed)  #History of left occipital craniotomy and underlying encephalomalacia, stable  -Monitor neurological status. Notify the MD for any acute changes in exam.  -Dilaudid IV for pain. Hold oral PRNs (oxycodone, Robaxin)  -Precedex drip, Zyprexa IV as needed for sedation  -RASS 0 to -1  -Home meds reviewed, no active psych meds listed  -Delirium precautions  -Soft restraints prn     Pulmonary care:   #History of allergic rhinitis  #History of pulmonary nodules  -Extubated 12/26 AM  -Supplemental oxygen to keep  saturation above 92 %.  -Incentive spirometer every 15- 30 minutes when awake.      Cardiovascular:    # Hx HTN  -Monitor hemodynamic status.   -No ongoing pressor requirements  -Goal normotension (page service for SBP's greater than 160)  - PRN labetalol available     GI care:   #Recurrent SBO  #Status post ex lap x 2 and new small bowel anastomosis   #History of GERD  #High risk for ileus  -Strict NPO  -Maintain NG tube, keep bridled, LIS  - scheduled NG tube flush orders in place  -Midline incision cares per primary team (wet-to-dry packing, first change by service)  -PPI prophylaxis with IV protonix (switched from famotidine)  -Simethicone and Tums available once able to take p.o.      Fluids/ Electrolytes/ Nutrition:   -Discontinued maintenance fluids iso TPN  -ICU electrolyte replacement protocol (high)  -Continue TPN  -Nutrition consulted. Appreciate recs     Renal/ Fluid Balance:    #Acute kidney injury  #Uremia  #Metabolic acidosis, high anion gap  -Discontinued maintenance fluids iso TPN  -Urine output borderline this AM (0.5 cc/kilogram/hour)  -Monitor intake and output, consider add'l fluids if indicated  -Avoid nephrotoxins  -Daily BMP      Endocrine:    #Stress hyperglycemia  #T2DM, poorly controlled  -Insulin drip and 10 units Lantus ordered for diabetes management.   -Goal blood sugar less than 180      ID/ Antibiotics:  #Small bowel perforation with gross intra-abdominal contamination  #Neutropenia, acute  #Multifocal groundglass opacities in lungs concerning for infection  #Hx HIV  -COVID-negative  -UA noninfectious  -Daily chest x-ray  -Transplant ID consult for assistance with anti-retroviral therapy management   -> recommend continuing Biktarvy, reordered   -> Continue IV Meropenem, end of treatment TBD    Cultures:   -12/24 blood cx -> S. Gallolyticus  -12/24 abdominal cx -> 4+ E. Cloacae, S. Gallolyticus, S. Anginosus, C. Freundii  - 12/25 blood cx -> NGTD  - 12/26 blood cx      Heme:     #  Acute postop anemia  -Lovenox DVT prophylaxis  -Hemoglobin 9.1 from 10.9, suspect dilutional d/t hematocrit drop  -Platelets within normal limits  -Daily CBC      Prophylaxis:    -Mechanical prophylaxis for DVT.   -Lovenox DVT prophylaxis resumed 12/25  -Protonix GI prophylaxis      MSK:    -PT and OT consulted. Appreciate recs.      Lines/ tubes/ drains:  -Right triple-lumen internal jugular -> removed today  -Right PIV  -Left radial A-line  -Potts -> remove today  -PICC (12/26-)      Disposition:  -Surgical ICU.    Patient seen and discussed with staff, Dr. Kate.    Bere Howe MD   PGY-2 General Surgery   Pager [phone]  Vocera    ====================================    TODAY'S SUBJECTIVE/INTERVAL HISTORY:   NAEO. Awake this morning after extubation. Does not remember details of last few days. Is oriented x3. Reports pain.     OBJECTIVE:     Temp:  [97.1  F (36.2  C)-97.7  F (36.5  C)] 97.6  F (36.4  C)  Pulse:  [70-88] 74  Resp:  [14-29] 17  BP: (83-99)/(63-74) 83/63  MAP:  [55 mmHg-99 mmHg] 81 mmHg  Arterial Line BP: ()/(42-75) 97/71  FiO2 (%):  [40 %] 40 %  SpO2:  [97 %-100 %] 100 %  FiO2 (%): 40 %, Resp: 17, Vent Mode: CMV/AC, Resp Rate (Set): 12 breaths/min, Tidal Volume (Set, mL): 450 mL, PEEP (cm H2O): 5 cmH2O, Pressure Support (cm H2O): 5 cmH2O, Resp Rate (Set): 12 breaths/min, Tidal Volume (Set, mL): 450 mL, PEEP (cm H2O): 5 cmH2O    I/O last 3 completed shifts:  In: 2034.12 [I.V.:1827.21; NG/GT:30]  Out: 2020 [Urine:1220; Emesis/NG output:800]    Gen: Alert and conversational adult male in NAD, oriented x3  Eyes: Nonicteric  ENT: Mucous Membranes Moist  Pulm: Nonlabored Breathing on RA  CV: RRR on telemetry  Abd: Soft, appropriately tender, mildly distended, midline incision CDI, abdominal binder in place  Skin: WWP  Extremities: MAEE, soft restraints in place  Neuro: CN grossly intact  Psych: Appropriate, conversational    LABS:   Arterial Blood Gases   Recent Labs   Lab 12/25/24  0816  12/25/24  0432 12/25/24  0024 12/24/24  2312   PH 7.45 7.44 7.38 7.36   PCO2 28* 30* 32* 33*   PO2 126* 78* 92 89   HCO3 20* 20* 19* 19*     Complete Blood Count   Recent Labs   Lab 12/26/24  0810 12/25/24  0430 12/24/24  2350 12/24/24  2312 12/24/24  1936 12/24/24  1804   WBC 5.8 3.9* 2.4*  --   --  3.7*   HGB 9.1* 10.9* 11.0* 11.0*   < > 10.8*    247 245  --   --  259    < > = values in this interval not displayed.     Basic Metabolic Panel  Recent Labs   Lab 12/26/24  1602 12/26/24  1408 12/26/24  1301 12/26/24  1054 12/26/24  0558 12/26/24  0437 12/25/24  1216 12/25/24  1152 12/25/24  0544 12/25/24  0430 12/25/24  0140 12/24/24  2350 12/24/24  2326 12/24/24  2312 12/24/24  1822 12/24/24  1804   NA  --   --   --   --   --  149*  --   --   --  148*  --  145  --  145   < > 144   POTASSIUM  --   --   --   --   --  3.5  --  3.8  --  3.3*  --  3.7  --  3.5   < > 3.4   CHLORIDE  --   --   --   --   --  122*  --   --   --  120*  --  116*  --   --   --  116*   CO2  --   --   --   --   --  19*  --   --   --  18*  --  17*  --   --   --  15*   BUN  --   --   --   --   --  42.8*  --   --   --  37.6*  --  43.8*  --   --   --  48.1*   CR  --   --   --   --   --  0.95  --   --   --  1.22*  --  1.29*  --   --   --  1.44*   * 106* 97 107*   < > 156*   < >  --    < > 99   < > 200*   < > 192*   < > 190*    < > = values in this interval not displayed.     Liver Function Tests  Recent Labs   Lab 12/26/24  0437 12/21/24  0921 12/20/24  1506   AST 59* 18 23   ALT 11 <5 12   ALKPHOS 65 72 132   BILITOTAL 0.5 1.3* 0.6   ALBUMIN 2.0* 3.4* 4.1   INR 1.16*  --  0.97     Pancreatic Enzymes  Recent Labs   Lab 12/20/24  1506   LIPASE 57     Coagulation Profile  Recent Labs   Lab 12/26/24  0437 12/20/24  1506   INR 1.16* 0.97   PTT  --  28         IMAGING:   Recent Results (from the past 24 hours)   XR Chest Port 1 View    Narrative    Exam: XR CHEST PORT 1 VIEW, 12/26/2024 2:13 AM    Comparison: 12/25/2024    History: Monitor  pulmonary opacities    Findings:  Portable AP view of the semiupright chest. Endotracheal tube is  roughly 3 cm above the antonio. Gastric tube with tip and sidehole in  the stomach. Right IJ CVC with tip at the cavoatrial junction. Trachea  is midline. Cardiomediastinal silhouette is stable. Similar perihilar  patchy opacities. Increased right pleural effusion. No pneumothorax.  The visualized upper abdomen is unremarkable. No acute osseous  abnormalities.      Impression    Impression:   1. Slight increase in right pleural effusion. No significant change in  perihilar mixed opacities.  2. Stable position of support devices.    I have personally reviewed the examination and initial interpretation  and I agree with the findings.    BORIS PAYTON MD         SYSTEM ID:  [id number]   XR Abdomen Port 1 View    Narrative    EXAMINATION: XR ABDOMEN PORT 1 VIEW 12/26/2024 5:41 AM     COMPARISON: 12/24/2024.    HISTORY: NG confirmation    TECHNIQUE: Frontal view of the abdomen.    FINDINGS: Enteric tube with tip and sidehole within the stomach. No  abnormally dilated loops of bowel. Air-filled loop of large bowel. No  pneumatosis or portal venous gas.       Impression    IMPRESSION: Enteric tube tip and sidehole project over the stomach.    I have personally reviewed the examination and initial interpretation  and I agree with the findings.    BORIS PAYTON MD         SYSTEM ID:  [id number]

## 2024-12-26 NOTE — PLAN OF CARE
"Pt extubated at 515 this AM, agitated and restless, pulled out L PIV and white port of R internal jugular, SICU resident notified, no order to remove. Does follow some command such as wiggle toes, squeeze fingers. Forgetful. Neuro intact. 5/5 strength. Moves all extremities. SR 70-80s. Blood surgar checks q1h. NG to LIS, nothing down tube. Potts w/ adequate UOP. Nurse to change abd dressing, surgery team changed this AM.     Problem: Adult Inpatient Plan of Care  Goal: Plan of Care Review  Description: The Plan of Care Review/Shift note should be completed every shift.  The Outcome Evaluation is a brief statement about your assessment that the patient is improving, declining, or no change.  This information will be displayed automatically on your shift  note.  Outcome: Progressing  Flowsheets (Taken 12/26/2024 0631)  Plan of Care Reviewed With: patient  Overall Patient Progress: improving  Goal: Patient-Specific Goal (Individualized)  Description: You can add care plan individualizations to a care plan. Examples of Individualization might be:  \"Parent requests to be called daily at 9am for status\", \"I have a hard time hearing out of my right ear\", or \"Do not touch me to wake me up as it startles  me\".  Outcome: Progressing  Goal: Absence of Hospital-Acquired Illness or Injury  Outcome: Progressing  Intervention: Prevent Skin Injury  Recent Flowsheet Documentation  Taken 12/26/2024 0400 by Pravin Bravo, SENG  Body Position:   position maintained   lower extremity elevated   upper extremity elevated  Taken 12/26/2024 0200 by Pravin Bravo, SENG  Body Position:   position maintained   lower extremity elevated   upper extremity elevated  Taken 12/26/2024 0000 by Pravin Bravo, SENG  Body Position:   position maintained   lower extremity elevated   upper extremity elevated  Taken 12/25/2024 2000 by Pravin Bravo, RN  Body Position:   position maintained   lower extremity elevated   upper extremity elevated  Intervention: Prevent and Manage " VTE (Venous Thromboembolism) Risk  Recent Flowsheet Documentation  Taken 12/26/2024 0400 by Pravin Bravo RN  VTE Prevention/Management: SCDs on (sequential compression devices)  Taken 12/26/2024 0000 by Pravin Bravo RN  VTE Prevention/Management: SCDs on (sequential compression devices)  Taken 12/25/2024 2000 by Pravin Bravo RN  VTE Prevention/Management: SCDs on (sequential compression devices)  Goal: Optimal Comfort and Wellbeing  Outcome: Progressing  Intervention: Provide Person-Centered Care  Recent Flowsheet Documentation  Taken 12/26/2024 0400 by Pravin Bravo RN  Trust Relationship/Rapport: care explained  Taken 12/26/2024 0000 by Pravin Bravo RN  Trust Relationship/Rapport:   care explained   questions answered   reassurance provided  Taken 12/25/2024 2000 by Pravin Bravo RN  Trust Relationship/Rapport:   care explained   reassurance provided  Goal: Readiness for Transition of Care  Outcome: Progressing     Problem: Pain Acute  Goal: Optimal Pain Control and Function  Outcome: Progressing  Intervention: Optimize Psychosocial Wellbeing  Recent Flowsheet Documentation  Taken 12/25/2024 2000 by Pravin Bravo RN  Supportive Measures: positive reinforcement provided  Intervention: Prevent or Manage Pain  Recent Flowsheet Documentation  Taken 12/25/2024 2000 by Pravin Bravo RN  Sensory Stimulation Regulation:   care clustered   quiet environment promoted     Problem: Intestinal Obstruction  Goal: Optimal Bowel Function  Outcome: Progressing  Intervention: Promote Bowel Function  Recent Flowsheet Documentation  Taken 12/26/2024 0400 by Pravin Bravo RN  Body Position:   position maintained   lower extremity elevated   upper extremity elevated  Head of Bed (HOB) Positioning: HOB at 30 degrees  Positioning/Transfer Devices:   pillows   in use  Taken 12/26/2024 0200 by Pravin Bravo RN  Body Position:   position maintained   lower extremity elevated   upper extremity elevated  Head of Bed (HOB) Positioning: HOB at 30  degrees  Positioning/Transfer Devices:   pillows   in use  Taken 12/26/2024 0000 by Pravin Bravo RN  Body Position:   position maintained   lower extremity elevated   upper extremity elevated  Head of Bed (HOB) Positioning: HOB at 30 degrees  Positioning/Transfer Devices:   pillows   in use  Taken 12/25/2024 2000 by Pravin Bravo RN  Body Position:   position maintained   lower extremity elevated   upper extremity elevated  Head of Bed (HOB) Positioning: HOB at 30 degrees  Positioning/Transfer Devices:   pillows   in use  Goal: Fluid and Electrolyte Balance  Outcome: Progressing  Goal: Absence of Infection Signs and Symptoms  Outcome: Progressing  Goal: Optimize Nutrition Status  Outcome: Progressing  Goal: Optimal Pain Control and Function  Outcome: Progressing     Problem: Comorbidity Management  Goal: Blood Glucose Levels Within Targeted Range  Outcome: Progressing     Problem: Restraint, Nonviolent  Goal: Absence of Harm or Injury  Outcome: Progressing  Intervention: Protect Dignity, Rights and Personal Wellbeing  Recent Flowsheet Documentation  Taken 12/26/2024 0400 by Pravin Bravo RN  Trust Relationship/Rapport: care explained  Taken 12/26/2024 0000 by Pravin Bravo RN  Trust Relationship/Rapport:   care explained   questions answered   reassurance provided  Taken 12/25/2024 2000 by Pravin Bravo RN  Trust Relationship/Rapport:   care explained   reassurance provided  Intervention: Protect Skin and Joint Integrity  Recent Flowsheet Documentation  Taken 12/26/2024 0400 by Pravin Bravo RN  Body Position:   position maintained   lower extremity elevated   upper extremity elevated  Taken 12/26/2024 0200 by Pravin Bravo RN  Body Position:   position maintained   lower extremity elevated   upper extremity elevated  Taken 12/26/2024 0000 by Pravin Bravo RN  Body Position:   position maintained   lower extremity elevated   upper extremity elevated  Taken 12/25/2024 2000 by Pravin Bravo RN  Body Position:   position maintained   lower  extremity elevated   upper extremity elevated   Goal Outcome Evaluation:      Plan of Care Reviewed With: patient    Overall Patient Progress: improvingOverall Patient Progress: improving

## 2024-12-26 NOTE — PROCEDURES
[facility]    Triple Lumen PICC Placement    Date/Time: 12/26/2024 11:43 AM    Performed by: Andrew Reyna RN  Authorized by: Bere Howe MD  Indications: vascular access      UNIVERSAL PROTOCOL   Site Marked: Yes  Prior Images Obtained and Reviewed:  Yes  Required items: Required blood products, implants, devices and special equipment available    Patient identity confirmed:  Verbally with patient, arm band, provided demographic data, hospital-assigned identification number and anonymous protocol, patient vented/unresponsive  NA - No sedation, light sedation, or local anesthesia  Confirmation Checklist:  Patient's identity using two indicators, relevant allergies, procedure was appropriate and matched the consent or emergent situation and correct equipment/implants were available  Time out: Immediately prior to the procedure a time out was called (Andrew)    Universal Protocol: the Joint [facility] Universal Protocol was followed    Preparation: Patient was prepped and draped in usual sterile fashion       ANESTHESIA    Anesthesia:  Local infiltration  Local Anesthetic:  Lidocaine 1% without epinephrine  Anesthetic Total (mL):  4      SEDATION    Patient Sedated: No        Preparation: skin prepped with ChloraPrep  Skin prep agent: skin prep agent completely dried prior to procedure  Sterile barriers: maximum sterile barriers were used: cap, mask, sterile gown, sterile gloves, and large sterile sheet  Hand hygiene: hand hygiene performed prior to central venous catheter insertion  Type of line used: PICC  Catheter type: triple lumen  Lumen type: non-valved and power PICC  Lumen Identification: Gray, Red and White  Catheter size: 5 Fr  Brand: Bard  Lot number: [id number]  Placement method: venipuncture, MST, ultrasound and tip navigation system  Number of attempts: 1  Difficulty threading catheter: no  Successful placement: yes  Orientation: right  Catheter to Vein  (%): 37  Location: brachial vein (medial) (0.47cm)  Tip Location: SVC  Arm circumference: adults 10 cm  Extremity circumference: 28  Visible catheter length: 4  Total catheter length: 42  Dressing and securement: adhesive securement device, alcohol impregnated caps, blood cleaned with CHG, chlorhexidine patch applied, blood removed, fixation device, gloves changed prior to final dressing, glue, line secured, secure lock, securement device, site cleansed and transparent securement dressing  Post procedure assessment: blood return through all ports, free fluid flow and placement verified by 3CG technology  PROCEDURE   Patient Tolerance:  Patient tolerated the procedure well with no immediate complicationsDescribe Procedure: Right brachial medial vein 0.47cm dm. 4cm external.Placement verified by Arpit 3CG.PICC okay to use.  Disposal: sharps and needle count correct at the end of procedure, needles and guidewire disposed in sharps container

## 2024-12-27 ENCOUNTER — APPOINTMENT (OUTPATIENT)
Dept: GENERAL RADIOLOGY | Facility: CLINIC | Age: 59
End: 2024-12-27
Payer: COMMERCIAL

## 2024-12-27 ENCOUNTER — APPOINTMENT (OUTPATIENT)
Dept: PHYSICAL THERAPY | Facility: CLINIC | Age: 59
End: 2024-12-27
Payer: COMMERCIAL

## 2024-12-27 VITALS
HEIGHT: 64 IN | RESPIRATION RATE: 21 BRPM | OXYGEN SATURATION: 100 % | HEART RATE: 71 BPM | TEMPERATURE: 98 F | DIASTOLIC BLOOD PRESSURE: 78 MMHG | WEIGHT: 183.86 LBS | BODY MASS INDEX: 31.39 KG/M2 | SYSTOLIC BLOOD PRESSURE: 114 MMHG

## 2024-12-27 LAB
ACINETOBACTER SPECIES: NOT DETECTED
ANION GAP SERPL CALCULATED.3IONS-SCNC: 8 MMOL/L (ref 7–15)
BACTERIA PRT CULT: ABNORMAL
BUN SERPL-MCNC: 45.7 MG/DL (ref 8–23)
CALCIUM SERPL-MCNC: 8.1 MG/DL (ref 8.8–10.4)
CHLORIDE SERPL-SCNC: 120 MMOL/L (ref 98–107)
CITROBACTER SPECIES: NOT DETECTED
CREAT SERPL-MCNC: 0.83 MG/DL (ref 0.67–1.17)
CTX-M: NOT DETECTED
EGFRCR SERPLBLD CKD-EPI 2021: >90 ML/MIN/1.73M2
ENTEROBACTER SPECIES: DETECTED
ERYTHROCYTE [DISTWIDTH] IN BLOOD BY AUTOMATED COUNT: 14.8 % (ref 10–15)
ESCHERICHIA COLI: NOT DETECTED
GLUCOSE BLDC GLUCOMTR-MCNC: 100 MG/DL (ref 70–99)
GLUCOSE BLDC GLUCOMTR-MCNC: 110 MG/DL (ref 70–99)
GLUCOSE BLDC GLUCOMTR-MCNC: 110 MG/DL (ref 70–99)
GLUCOSE BLDC GLUCOMTR-MCNC: 112 MG/DL (ref 70–99)
GLUCOSE BLDC GLUCOMTR-MCNC: 112 MG/DL (ref 70–99)
GLUCOSE BLDC GLUCOMTR-MCNC: 117 MG/DL (ref 70–99)
GLUCOSE BLDC GLUCOMTR-MCNC: 117 MG/DL (ref 70–99)
GLUCOSE BLDC GLUCOMTR-MCNC: 119 MG/DL (ref 70–99)
GLUCOSE BLDC GLUCOMTR-MCNC: 122 MG/DL (ref 70–99)
GLUCOSE BLDC GLUCOMTR-MCNC: 126 MG/DL (ref 70–99)
GLUCOSE BLDC GLUCOMTR-MCNC: 127 MG/DL (ref 70–99)
GLUCOSE BLDC GLUCOMTR-MCNC: 130 MG/DL (ref 70–99)
GLUCOSE BLDC GLUCOMTR-MCNC: 146 MG/DL (ref 70–99)
GLUCOSE BLDC GLUCOMTR-MCNC: 150 MG/DL (ref 70–99)
GLUCOSE BLDC GLUCOMTR-MCNC: 168 MG/DL (ref 70–99)
GLUCOSE BLDC GLUCOMTR-MCNC: 168 MG/DL (ref 70–99)
GLUCOSE BLDC GLUCOMTR-MCNC: 191 MG/DL (ref 70–99)
GLUCOSE SERPL-MCNC: 191 MG/DL (ref 70–99)
HCO3 SERPL-SCNC: 20 MMOL/L (ref 22–29)
HCT VFR BLD AUTO: 30.9 % (ref 40–53)
HGB BLD-MCNC: 9.6 G/DL (ref 13.3–17.7)
IMP: NOT DETECTED
KLEBSIELLA OXYTOCA: NOT DETECTED
KLEBSIELLA PNEUMONIAE: NOT DETECTED
KPC: NOT DETECTED
MAGNESIUM SERPL-MCNC: 2.1 MG/DL (ref 1.7–2.3)
MCH RBC QN AUTO: 28 PG (ref 26.5–33)
MCHC RBC AUTO-ENTMCNC: 31.1 G/DL (ref 31.5–36.5)
MCV RBC AUTO: 90 FL (ref 78–100)
NDM: NOT DETECTED
OXA (DETECTED/NOT DETECTED): NOT DETECTED
PATH REPORT.COMMENTS IMP SPEC: NORMAL
PATH REPORT.COMMENTS IMP SPEC: NORMAL
PATH REPORT.FINAL DX SPEC: NORMAL
PATH REPORT.GROSS SPEC: NORMAL
PATH REPORT.MICROSCOPIC SPEC OTHER STN: NORMAL
PATH REPORT.RELEVANT HX SPEC: NORMAL
PHOSPHATE SERPL-MCNC: 2.9 MG/DL (ref 2.5–4.5)
PHOTO IMAGE: NORMAL
PLATELET # BLD AUTO: 183 10E3/UL (ref 150–450)
PLATELET # BLD AUTO: 186 10E3/UL (ref 150–450)
POTASSIUM SERPL-SCNC: 3.8 MMOL/L (ref 3.4–5.3)
PROTEUS SPECIES: NOT DETECTED
PSEUDOMONAS AERUGINOSA: NOT DETECTED
RBC # BLD AUTO: 3.43 10E6/UL (ref 4.4–5.9)
SODIUM SERPL-SCNC: 148 MMOL/L (ref 135–145)
VIM: NOT DETECTED
WBC # BLD AUTO: 6.9 10E3/UL (ref 4–11)

## 2024-12-27 PROCEDURE — 71045 X-RAY EXAM CHEST 1 VIEW: CPT | Mod: 26 | Performed by: STUDENT IN AN ORGANIZED HEALTH CARE EDUCATION/TRAINING PROGRAM

## 2024-12-27 PROCEDURE — 250N000013 HC RX MED GY IP 250 OP 250 PS 637: Performed by: SURGERY

## 2024-12-27 PROCEDURE — 200N000002 HC R&B ICU UMMC

## 2024-12-27 PROCEDURE — 250N000011 HC RX IP 250 OP 636: Performed by: STUDENT IN AN ORGANIZED HEALTH CARE EDUCATION/TRAINING PROGRAM

## 2024-12-27 PROCEDURE — 71045 X-RAY EXAM CHEST 1 VIEW: CPT

## 2024-12-27 PROCEDURE — 97530 THERAPEUTIC ACTIVITIES: CPT | Mod: GP | Performed by: PHYSICAL THERAPIST

## 2024-12-27 PROCEDURE — 250N000013 HC RX MED GY IP 250 OP 250 PS 637: Performed by: STUDENT IN AN ORGANIZED HEALTH CARE EDUCATION/TRAINING PROGRAM

## 2024-12-27 PROCEDURE — 250N000011 HC RX IP 250 OP 636

## 2024-12-27 PROCEDURE — 93005 ELECTROCARDIOGRAM TRACING: CPT

## 2024-12-27 PROCEDURE — 250N000009 HC RX 250: Performed by: NURSE PRACTITIONER

## 2024-12-27 PROCEDURE — 250N000011 HC RX IP 250 OP 636: Performed by: SURGERY

## 2024-12-27 PROCEDURE — 99291 CRITICAL CARE FIRST HOUR: CPT | Mod: GC | Performed by: SURGERY

## 2024-12-27 PROCEDURE — 83735 ASSAY OF MAGNESIUM: CPT | Performed by: SURGERY

## 2024-12-27 PROCEDURE — 80048 BASIC METABOLIC PNL TOTAL CA: CPT | Performed by: STUDENT IN AN ORGANIZED HEALTH CARE EDUCATION/TRAINING PROGRAM

## 2024-12-27 PROCEDURE — 85027 COMPLETE CBC AUTOMATED: CPT | Performed by: STUDENT IN AN ORGANIZED HEALTH CARE EDUCATION/TRAINING PROGRAM

## 2024-12-27 PROCEDURE — 99233 SBSQ HOSP IP/OBS HIGH 50: CPT | Performed by: INTERNAL MEDICINE

## 2024-12-27 PROCEDURE — 250N000009 HC RX 250: Performed by: STUDENT IN AN ORGANIZED HEALTH CARE EDUCATION/TRAINING PROGRAM

## 2024-12-27 PROCEDURE — 250N000011 HC RX IP 250 OP 636: Performed by: INTERNAL MEDICINE

## 2024-12-27 PROCEDURE — 250N000013 HC RX MED GY IP 250 OP 250 PS 637: Performed by: NURSE PRACTITIONER

## 2024-12-27 PROCEDURE — 258N000003 HC RX IP 258 OP 636

## 2024-12-27 PROCEDURE — 93010 ELECTROCARDIOGRAM REPORT: CPT | Performed by: INTERNAL MEDICINE

## 2024-12-27 PROCEDURE — 250N000011 HC RX IP 250 OP 636: Mod: JZ

## 2024-12-27 PROCEDURE — 84100 ASSAY OF PHOSPHORUS: CPT | Performed by: INTERNAL MEDICINE

## 2024-12-27 PROCEDURE — 250N000011 HC RX IP 250 OP 636: Mod: JW | Performed by: NURSE PRACTITIONER

## 2024-12-27 PROCEDURE — 250N000009 HC RX 250: Performed by: SURGERY

## 2024-12-27 PROCEDURE — B4185 PARENTERAL SOL 10 GM LIPIDS: HCPCS | Performed by: SURGERY

## 2024-12-27 RX ORDER — ACETAMINOPHEN 325 MG/1
975 TABLET ORAL EVERY 8 HOURS SCHEDULED
Status: DISCONTINUED | OUTPATIENT
Start: 2024-12-27 | End: 2025-01-20 | Stop reason: HOSPADM

## 2024-12-27 RX ORDER — MEROPENEM 1 G/1
1 INJECTION, POWDER, FOR SOLUTION INTRAVENOUS EVERY 8 HOURS
Status: DISCONTINUED | OUTPATIENT
Start: 2024-12-27 | End: 2024-12-27

## 2024-12-27 RX ORDER — METRONIDAZOLE 500 MG/100ML
500 INJECTION, SOLUTION INTRAVENOUS EVERY 8 HOURS
Status: DISCONTINUED | OUTPATIENT
Start: 2024-12-27 | End: 2024-12-31

## 2024-12-27 RX ORDER — QUETIAPINE FUMARATE 50 MG/1
50 TABLET, FILM COATED ORAL AT BEDTIME
Status: DISCONTINUED | OUTPATIENT
Start: 2024-12-27 | End: 2025-01-20 | Stop reason: HOSPADM

## 2024-12-27 RX ORDER — CEFEPIME HYDROCHLORIDE 2 G/1
2 INJECTION, POWDER, FOR SOLUTION INTRAVENOUS EVERY 8 HOURS
Status: DISCONTINUED | OUTPATIENT
Start: 2024-12-27 | End: 2025-01-02

## 2024-12-27 RX ORDER — QUETIAPINE FUMARATE 25 MG/1
25 TABLET, FILM COATED ORAL EVERY 8 HOURS PRN
Status: DISCONTINUED | OUTPATIENT
Start: 2024-12-27 | End: 2024-12-30

## 2024-12-27 RX ORDER — NOREPINEPHRINE BITARTRATE 0.06 MG/ML
INJECTION, SOLUTION INTRAVENOUS
Status: DISCONTINUED
Start: 2024-12-27 | End: 2024-12-28 | Stop reason: HOSPADM

## 2024-12-27 RX ORDER — QUETIAPINE FUMARATE 25 MG/1
25 TABLET, FILM COATED ORAL 2 TIMES DAILY
Status: DISCONTINUED | OUTPATIENT
Start: 2024-12-27 | End: 2024-12-30

## 2024-12-27 RX ORDER — ALBUTEROL SULFATE 90 UG/1
1-2 INHALANT RESPIRATORY (INHALATION) EVERY 6 HOURS PRN
Status: DISCONTINUED | OUTPATIENT
Start: 2024-12-27 | End: 2025-01-20 | Stop reason: HOSPADM

## 2024-12-27 RX ORDER — POTASSIUM CHLORIDE 29.8 MG/ML
20 INJECTION INTRAVENOUS ONCE
Status: COMPLETED | OUTPATIENT
Start: 2024-12-27 | End: 2024-12-27

## 2024-12-27 RX ADMIN — QUETIAPINE FUMARATE 25 MG: 25 TABLET ORAL at 13:58

## 2024-12-27 RX ADMIN — ACETAMINOPHEN 975 MG: 325 TABLET, FILM COATED ORAL at 13:58

## 2024-12-27 RX ADMIN — BICTEGRAVIR SODIUM, EMTRICITABINE, AND TENOFOVIR ALAFENAMIDE FUMARATE 1 TABLET: 50; 200; 25 TABLET ORAL at 14:03

## 2024-12-27 RX ADMIN — INSULIN HUMAN 2 UNITS/HR: 1 INJECTION, SOLUTION INTRAVENOUS at 19:42

## 2024-12-27 RX ADMIN — OLANZAPINE 5 MG: 10 INJECTION, POWDER, FOR SOLUTION INTRAMUSCULAR at 12:21

## 2024-12-27 RX ADMIN — POTASSIUM PHOSPHATE, MONOBASIC POTASSIUM PHOSPHATE, DIBASIC: 224; 236 INJECTION, SOLUTION, CONCENTRATE INTRAVENOUS at 19:54

## 2024-12-27 RX ADMIN — MEROPENEM 1 G: 1 INJECTION, POWDER, FOR SOLUTION INTRAVENOUS at 02:04

## 2024-12-27 RX ADMIN — ACETAMINOPHEN 975 MG: 325 TABLET, FILM COATED ORAL at 21:58

## 2024-12-27 RX ADMIN — PANTOPRAZOLE SODIUM 40 MG: 40 INJECTION, POWDER, FOR SOLUTION INTRAVENOUS at 07:46

## 2024-12-27 RX ADMIN — HYDROMORPHONE HYDROCHLORIDE 0.4 MG: 0.2 INJECTION, SOLUTION INTRAMUSCULAR; INTRAVENOUS; SUBCUTANEOUS at 13:59

## 2024-12-27 RX ADMIN — HYDROMORPHONE HYDROCHLORIDE 0.4 MG: 0.2 INJECTION, SOLUTION INTRAMUSCULAR; INTRAVENOUS; SUBCUTANEOUS at 18:41

## 2024-12-27 RX ADMIN — MEROPENEM 1 G: 1 INJECTION, POWDER, FOR SOLUTION INTRAVENOUS at 10:36

## 2024-12-27 RX ADMIN — OLIVE OIL AND SOYBEAN OIL 250 ML: 16; 4 INJECTION, EMULSION INTRAVENOUS at 19:49

## 2024-12-27 RX ADMIN — DEXMEDETOMIDINE HYDROCHLORIDE 0.9 MCG/KG/HR: 400 INJECTION INTRAVENOUS at 02:12

## 2024-12-27 RX ADMIN — HYDROMORPHONE HYDROCHLORIDE 0.4 MG: 0.2 INJECTION, SOLUTION INTRAMUSCULAR; INTRAVENOUS; SUBCUTANEOUS at 06:33

## 2024-12-27 RX ADMIN — LIDOCAINE 4% 1 PATCH: 40 PATCH TOPICAL at 03:57

## 2024-12-27 RX ADMIN — Medication 5 MG: at 20:35

## 2024-12-27 RX ADMIN — POTASSIUM CHLORIDE 20 MEQ: 29.8 INJECTION, SOLUTION INTRAVENOUS at 06:02

## 2024-12-27 RX ADMIN — MICAFUNGIN 100 MG: 20 INJECTION, POWDER, LYOPHILIZED, FOR SOLUTION INTRAVENOUS at 18:28

## 2024-12-27 RX ADMIN — HYDROMORPHONE HYDROCHLORIDE 0.4 MG: 0.2 INJECTION, SOLUTION INTRAMUSCULAR; INTRAVENOUS; SUBCUTANEOUS at 08:44

## 2024-12-27 RX ADMIN — ENOXAPARIN SODIUM 40 MG: 40 INJECTION SUBCUTANEOUS at 19:48

## 2024-12-27 RX ADMIN — CEFEPIME HYDROCHLORIDE 2 G: 2 INJECTION, POWDER, FOR SOLUTION INTRAVENOUS at 18:28

## 2024-12-27 RX ADMIN — QUETIAPINE FUMARATE 50 MG: 50 TABLET ORAL at 21:58

## 2024-12-27 RX ADMIN — QUETIAPINE FUMARATE 25 MG: 25 TABLET ORAL at 10:36

## 2024-12-27 RX ADMIN — METRONIDAZOLE 500 MG: 500 INJECTION, SOLUTION INTRAVENOUS at 18:27

## 2024-12-27 RX ADMIN — ACETAMINOPHEN 975 MG: 325 TABLET, FILM COATED ORAL at 10:37

## 2024-12-27 RX ADMIN — HYDROMORPHONE HYDROCHLORIDE 0.4 MG: 0.2 INJECTION, SOLUTION INTRAMUSCULAR; INTRAVENOUS; SUBCUTANEOUS at 02:10

## 2024-12-27 ASSESSMENT — ACTIVITIES OF DAILY LIVING (ADL)
ADLS_ACUITY_SCORE: 48
ADLS_ACUITY_SCORE: 54
ADLS_ACUITY_SCORE: 44
ADLS_ACUITY_SCORE: 46
ADLS_ACUITY_SCORE: 54
ADLS_ACUITY_SCORE: 48
ADLS_ACUITY_SCORE: 54
ADLS_ACUITY_SCORE: 54
ADLS_ACUITY_SCORE: 48
ADLS_ACUITY_SCORE: 46
ADLS_ACUITY_SCORE: 52
ADLS_ACUITY_SCORE: 46
ADLS_ACUITY_SCORE: 48
ADLS_ACUITY_SCORE: 54
ADLS_ACUITY_SCORE: 54
ADLS_ACUITY_SCORE: 47
ADLS_ACUITY_SCORE: 46

## 2024-12-27 ASSESSMENT — VISUAL ACUITY
OU: BASELINE
OU: BASELINE

## 2024-12-27 NOTE — PROCEDURES
Bridle Placement:   Reason for bridle placement: securement   Medicine delivered during procedure: lubricating jelly lidocaine gel   Procedure: Successful   Location of top of clip on FT: @ 62 cm marker   Condition of nose/skin at time of bridle placement: Unremarkable   Face to Face time with patient: 5 minutes.

## 2024-12-27 NOTE — PLAN OF CARE
"Major Shift Events:    Neuro: a/o x 2-3. Delirium screen positive. Follows commands for the most part. Gets frustrated but not without reason. Precedex off since 0900. Zyprexa given x1 around noon for agitation (yelling) but has been redirectable and pleasant otherwise. Makes comments about wanting to go to \"HIV hospital\" and expressed desire to have his HIV MD listed as his primary provider during his stay. Seroquel and tylenol scheduled.   CV: afebrile. SR, T wave inversion V1. RBBB suspected, EKG WNL. :)  Resp: deep, labored breathing on 2L nc. I.S. performed 10x/hr until about noon, then pt intermittently compliant. LS went from clear to coarse. Flutter valve ordered.   GI: bowel sounds absent x1, MD assessed and they were audible. Urge to BM but not productive. TPN at 45. NG at 60, to LIS but meds OK (clamping x 1 hr post med admin).  Renal: good UO, using primofit. Urine clear lexy.  Endocrine: Insulin gtt 2-4 U/hr at algorithm 4    Infectious: micafungin, flagyl, and cefepime ordered at 1600 due to enterobacter blood culture result. ID following, Pt not initially receptive to the specific I.D. MD who saw him but ultimately allowed her to assess him.    Plan: treat bacteremia. Manage behavior without precedex as able.   For vital signs and complete assessments, please see documentation flowsheets.     Problem: Intestinal Obstruction  Goal: Absence of Infection Signs and Symptoms  Outcome: Not Progressing  Intervention: Prevent or Manage Infection  Recent Flowsheet Documentation  Taken 12/27/2024 1200 by Donya Kennedy RN  Isolation Precautions: contact precautions maintained  Taken 12/27/2024 0800 by Donya Kennedy, RN  Isolation Precautions: contact precautions maintained     Problem: Adult Inpatient Plan of Care  Goal: Optimal Comfort and Wellbeing  Outcome: Progressing  Intervention: Monitor Pain and Promote Comfort  Recent Flowsheet Documentation  Taken 12/27/2024 1200 by Donya Kennedy, " RN  Pain Management Interventions: rest  Taken 12/27/2024 0800 by Donya Kennedy RN  Pain Management Interventions:   medication (see MAR)   repositioned  Intervention: Provide Person-Centered Care  Recent Flowsheet Documentation  Taken 12/27/2024 1200 by Donya Kennedy RN  Trust Relationship/Rapport:   care explained   choices provided   emotional support provided   questions answered   reassurance provided   thoughts/feelings acknowledged   empathic listening provided   questions encouraged  Taken 12/27/2024 0800 by Donya Kennedy RN  Trust Relationship/Rapport:   care explained   choices provided   emotional support provided   questions answered   reassurance provided   thoughts/feelings acknowledged   empathic listening provided   questions encouraged     Problem: Pain Acute  Goal: Optimal Pain Control and Function  Outcome: Progressing  Intervention: Optimize Psychosocial Wellbeing  Recent Flowsheet Documentation  Taken 12/27/2024 1435 by Donya Kennedy RN  Supportive Measures:   active listening utilized   positive reinforcement provided   self-reflection promoted   decision-making supported  Taken 12/27/2024 1200 by Donya Kennedy RN  Spiritual Activities Assistance: affirmation provided  Supportive Measures:   positive reinforcement provided   relaxation techniques promoted   verbalization of feelings encouraged   active listening utilized   self-care encouraged   self-reflection promoted   problem-solving facilitated  Taken 12/27/2024 0800 by Donya Kennedy RN  Spiritual Activities Assistance: affirmation provided  Supportive Measures:   positive reinforcement provided   relaxation techniques promoted   verbalization of feelings encouraged   active listening utilized   self-care encouraged   self-reflection promoted  Intervention: Develop Pain Management Plan  Recent Flowsheet Documentation  Taken 12/27/2024 1200 by Donya Kennedy RN  Pain Management Interventions:  rest  Taken 12/27/2024 0800 by Donya Kennedy RN  Pain Management Interventions:   medication (see MAR)   repositioned  Intervention: Prevent or Manage Pain  Recent Flowsheet Documentation  Taken 12/27/2024 1435 by Donya Kennedy RN  Sleep/Rest Enhancement:   comfort measures   regular sleep/rest pattern promoted   natural light exposure provided   relaxation techniques promoted  Taken 12/27/2024 1200 by Donya Kennedy RN  Sensory Stimulation Regulation:   care clustered   lighting decreased   quiet environment promoted   auditory stimulation minimized  Sleep/Rest Enhancement:   comfort measures   consistent schedule promoted   noise level reduced   relaxation techniques promoted   medication   natural light exposure provided   regular sleep/rest pattern promoted   visualization  Bowel Elimination Promotion: privacy promoted  Medication Review/Management:   medications reviewed   high-risk medications identified  Taken 12/27/2024 0800 by Donya Kennedy RN  Sensory Stimulation Regulation:   care clustered   lighting decreased   quiet environment promoted   auditory stimulation minimized  Sleep/Rest Enhancement:   comfort measures   consistent schedule promoted   noise level reduced   relaxation techniques promoted   medication   natural light exposure provided   regular sleep/rest pattern promoted   visualization  Bowel Elimination Promotion: privacy promoted  Medication Review/Management:   medications reviewed   high-risk medications identified     Problem: Intestinal Obstruction  Goal: Optimal Pain Control and Function  Outcome: Progressing  Intervention: Prevent or Manage Pain  Recent Flowsheet Documentation  Taken 12/27/2024 1435 by Donya Kennedy RN  Sleep/Rest Enhancement:   comfort measures   regular sleep/rest pattern promoted   natural light exposure provided   relaxation techniques promoted  Taken 12/27/2024 1200 by Donya Kennedy RN  Pain Management Interventions:  rest  Sleep/Rest Enhancement:   comfort measures   consistent schedule promoted   noise level reduced   relaxation techniques promoted   medication   natural light exposure provided   regular sleep/rest pattern promoted   visualization  Taken 12/27/2024 0800 by Donya Kennedy RN  Pain Management Interventions:   medication (see MAR)   repositioned  Sleep/Rest Enhancement:   comfort measures   consistent schedule promoted   noise level reduced   relaxation techniques promoted   medication   natural light exposure provided   regular sleep/rest pattern promoted   visualization     Problem: Restraint, Nonviolent  Goal: Absence of Harm or Injury  Outcome: Progressing  Intervention: Implement Least Restrictive Safety Strategies  Recent Flowsheet Documentation  Taken 12/27/2024 1200 by Donya Kennedy RN  Medical Device Protection:   IV pole/bag removed from visual field   tubing secured  Taken 12/27/2024 0800 by Donya Kennedy RN  Medical Device Protection:   IV pole/bag removed from visual field   tubing secured  Intervention: Protect Dignity, Rights and Personal Wellbeing  Recent Flowsheet Documentation  Taken 12/27/2024 1200 by Donya Kennedy RN  Trust Relationship/Rapport:   care explained   choices provided   emotional support provided   questions answered   reassurance provided   thoughts/feelings acknowledged   empathic listening provided   questions encouraged  Taken 12/27/2024 0800 by Donya Kennedy RN  Trust Relationship/Rapport:   care explained   choices provided   emotional support provided   questions answered   reassurance provided   thoughts/feelings acknowledged   empathic listening provided   questions encouraged  Intervention: Protect Skin and Joint Integrity  Recent Flowsheet Documentation  Taken 12/27/2024 1200 by Donya Kennedy RN  Body Position:   right   turned   side-lying 30 degrees  Skin Protection:   adhesive use limited   incontinence pads utilized  Range of  Motion:   active ROM (range of motion) encouraged   ROM (range of motion) performed  Taken 12/27/2024 1000 by Donya Kennedy RN  Body Position:   left   turned   side-lying 30 degrees  Taken 12/27/2024 0800 by Donya Kennedy RN  Body Position:   right   turned   side-lying 30 degrees  Skin Protection:   adhesive use limited   incontinence pads utilized  Range of Motion:   active ROM (range of motion) encouraged   ROM (range of motion) performed   Goal Outcome Evaluation:

## 2024-12-27 NOTE — PROGRESS NOTES
General Infectious Disease Service - Green Team    Patient:  Andrew Collier, Date of birth 1965, Medical record number [id number]  Date of Admission: 12/20/2024  Date of Visit:  12/27/2024         Assessment and Recommendations:   Problem List:    # Streptococcus gallolyticus and Enterobacter bacteremia - likely from abdominal source     # SBO s/p ex lap with small bowel stricture resection and primary anastomosis -> complicated with anastomotic leak and underwent new laparotomy on 12/24 exploratory laparotomy and small bowel resection - peritoneal fluid with polymicrobial growth      # HIV in ARV     Discussion:     Mr/. Andrew Collier is a 59 year old male with history significant for recurrent SBO, poorly controlled T2DM, HIV on ART/biktarvy (followed by Dr. Ivory in clinic - CD4 from 11/5/24 was 432 and HIV VL from 12/12/24 was 153) and recent admission for SBO managed conservatively with decompression and gastrograffin challenge who presented to the ED on 12/18 with abdominal pain. CT imaging with isolated segment of dilated bowel concerning for closed loop obstruction; patient was taken to the OR on 12/20/2024 for ex lap with small bowel stricture resection and primary anastomosis.  Postoperatively patient was transferred to floor, where he had continued abdominal pain, bloating, and tachycardia.  A rapid response was called morning of 12/24 for ongoing tachycardia and altered mental status, along with notable leukopenia and metabolic acidosis.  CT imaging was ordered and that afternoon showed dilation of the bowel proximal to the new anastomosis, with moderate volume of free fluid and free air in the abdomen concerning for anastomotic leak.  Patient was taken to the OR on the evening of 12/24 for exploratory laparotomy, small bowel resection, and anastomotic revision.  IntraOp course notable for 125 cm of bowel remaining proximally, and 115 cm remaining distally after resection.   Bowel appeared healthy IntraOp.  Patient was unable to be extubated in PACU, primarily due to neurologic concerns, and was transferred to the SICU on minimal vent settings.  An NG tube was placed in the OR (difficult placement). CT chest also demonstrated Multifocal groundglass opacities in the lungs are worrisome for infection + small to moderate right pleural effusion.      SICU team consulted ID for review of antiretroviral treatment. Patient recently seen in clinic by Dr. Ivory (12/12/24). Biktarvy continued at that time. Report of relatively good adherence. CD4 from 11/5/24 was 432 so not need for prophylaxis for opportunistic infections. HIV VL from 12/12/24 was 153. Patient had a genotype/phenotype in 2012 and it did not show evidence of resistance. The viral load of 153 likely translates intermittent adherence. It is probably to low of a viral load for a new genotype test but worth to order it. Patient is now intubated and sedated, but NG tube placed on 12/25. If pharmacy does not identify any medication interaction and does not encounter any impediment in administering biktegravir through NG tube,I would continue biktarvy at this moment.      Patient developed fever on 12/24 (101.1 F) and leukopenia of 2.4. Noted that the patient underwent re-do laparotomy on 12/24 with bowel resection. White count 3.9 today. He was started on zosyn on 12/24. Blood culture from 12/24 positive for Streptococcus species in 1 bottle. Repeat blood culture from 12/25 negative to date. Peritoneal fluid culture  (intra op?) from 12/24 positive for Enterobacter cloacae, Citrobacter freundii, Streptococcus gallolyticus (bovis group), Streptococcus anginosus, Clostridium perfrigens.  Patient in the surgical ICU setting, extubated on 12/26.  He is not on vasopressors. Zosyn transitioned to meropenem on 12/25/24. Blood culture from 12/24 positive for Streptococcus gallolyticus, Gram positive bacilli (confirmed with microbiology that  ir grew in anaerobic bottle, so possible that it might be the Clostridium perfrigens seen in peritoneal fluid) and Enterobacter species.  Patient is afebrile today. He was extubated today and he is on O2 by nasal cannula. Not on vasopressors.       Recommendations:    Please stop meropenem and start cefepime 2 grams IV q 8h since the Enterobacter and Citrobacter are very susceptible to cefepime    Please start metronidazole 500 mg (po or IV) q 8h for coverage of Clostridium perfringens and other possible anaerobic organisms which might have not grown yet    Start micafungin 100 mg IV q 24h for yeast in peritoneal fluid (we might be able to narrow to fluconazole once identification and susceptibilities are back)    CT chest from 12/24 showed bilateral effusions worrisome for infection The regimen above would continue to provide coverage for respiratory infection     In regards of HIV management, patient recently seen in clinic by Dr. Ivory (12/12/24). Biktarvy continued at that time. Report of relatively good adherence. CD4 from 11/5/24 was 432 so not need for prophylaxis for opportunistic infections. HIV VL from 12/12/24 was 153. Patient had a genotype/phenotype in 2015 and it did not show evidence of resistance. The viral load of 153 likely translates intermittent adherence. It is probably too low of a viral load for a new genotype test but worth to order it (I ordered it for you). Patient extubated today and he has a  NG tube placed on 12/25. If pharmacy does not identify any medication interaction and does not encounter any impediment in administering biktegravir through NG tube, I would continue biktarvy at this moment.       The General ID team will continue to follow this patient. Please feel free to call with any questions. Dr. Bueno will be on call for General ID over the weekend (12/28 -12/29) please page Dr. Bueno via [facility] with questions over the weekend. Dr. Morrow will assume care of this patient on  "Monday 12/30/24.    ROSELINE SALEH MD  Date of Service: 12/27/24  Pager: [phone]             Physical Exam:   /62   Pulse 80   Temp 98.1  F (36.7  C) (Oral)   Resp 18   Ht 1.626 m (5' 4\")   Wt 83.3 kg (183 lb 10.3 oz)   SpO2 98%   BMI 31.52 kg/m         Exam:  GENERAL:  Not in acute distress.   HEAD: Normocephalic and atraumatic  ENT:  No hearing impairment  EYES:  Eyes grossly normal to inspection  LUNGS:  Extubated  CARDIOVASCULAR:  Regular rate and rhythm, normal S1 S2  ABDOMEN:  Soft, non-tender  EXT: Extremities warm and without edema.  MS: No gross musculoskeletal defects noted, no edema  SKIN:  No acute rashes or suspicious lesions         Laboratory Data:     Creatinine   Date Value Ref Range Status   12/27/2024 0.83 0.67 - 1.17 mg/dL Final   12/26/2024 0.95 0.67 - 1.17 mg/dL Final   12/25/2024 1.22 (H) 0.67 - 1.17 mg/dL Final   12/24/2024 1.29 (H) 0.67 - 1.17 mg/dL Final   12/24/2024 1.44 (H) 0.67 - 1.17 mg/dL Final   06/24/2021 0.81 0.66 - 1.25 mg/dL Final   06/15/2021 0.79 0.66 - 1.25 mg/dL Final   03/10/2021 1.02 0.66 - 1.25 mg/dL Final   01/21/2021 0.91 0.66 - 1.25 mg/dL Final   01/10/2021 0.75 0.66 - 1.25 mg/dL Final     WBC   Date Value Ref Range Status   06/24/2021 9.6 4.0 - 11.0 10e9/L Final   06/15/2021 7.4 4.0 - 11.0 10e9/L Final   03/10/2021 12.0 (H) 4.0 - 11.0 10e9/L Final   01/21/2021 9.6 4.0 - 11.0 10e9/L Final   01/10/2021 9.2 4.0 - 11.0 10e9/L Final     WBC Count   Date Value Ref Range Status   12/27/2024 6.9 4.0 - 11.0 10e3/uL Final   12/26/2024 5.8 4.0 - 11.0 10e3/uL Final   12/25/2024 3.9 (L) 4.0 - 11.0 10e3/uL Final   12/24/2024 2.4 (L) 4.0 - 11.0 10e3/uL Final   12/24/2024 3.7 (L) 4.0 - 11.0 10e3/uL Final     Hemoglobin   Date Value Ref Range Status   12/27/2024 9.6 (L) 13.3 - 17.7 g/dL Final   06/24/2021 13.5 13.3 - 17.7 g/dL Final     Platelet Count   Date Value Ref Range Status   12/27/2024 186 150 - 450 10e3/uL Final   06/24/2021 386 150 - 450 10e9/L Final " "    Lab Results   Component Value Date     (H) 12/27/2024    BUN 45.7 (H) 12/27/2024    CO2 20 (L) 12/27/2024     CRP Inflammation   Date Value Ref Range Status   02/19/2022 5.6 0.0 - 8.0 mg/L Final   02/18/2022 4.1 0.0 - 8.0 mg/L Final   01/22/2022 34.0 (H) 0.0 - 8.0 mg/L Final   10/22/2021 <2.9 0.0 - 8.0 mg/L Final   12/19/2020 7.2 0.0 - 8.0 mg/L Final   02/26/2020 7.9 0.0 - 8.0 mg/L Final   02/25/2020 17.0 (H) 0.0 - 8.0 mg/L Final   02/14/2020 5.0 0.0 - 8.0 mg/L Final   10/08/2018 <2.9 0.0 - 8.0 mg/L Final           Pertinent Recent Microbiology Data:   No results for input(s): \"CULT\", \"SDES\" in the last 168 hours.         Imaging:     Recent Results (from the past 48 hours)   XR Chest Port 1 View    Narrative    Exam: XR CHEST PORT 1 VIEW, 12/26/2024 2:13 AM    Comparison: 12/25/2024    History: Monitor pulmonary opacities    Findings:  Portable AP view of the semiupright chest. Endotracheal tube is  roughly 3 cm above the antonio. Gastric tube with tip and sidehole in  the stomach. Right IJ CVC with tip at the cavoatrial junction. Trachea  is midline. Cardiomediastinal silhouette is stable. Similar perihilar  patchy opacities. Increased right pleural effusion. No pneumothorax.  The visualized upper abdomen is unremarkable. No acute osseous  abnormalities.      Impression    Impression:   1. Slight increase in right pleural effusion. No significant change in  perihilar mixed opacities.  2. Stable position of support devices.    I have personally reviewed the examination and initial interpretation  and I agree with the findings.    BORIS PAYTON MD         SYSTEM ID:  [id number]   XR Abdomen Port 1 View    Narrative    EXAMINATION: XR ABDOMEN PORT 1 VIEW 12/26/2024 5:41 AM     COMPARISON: 12/24/2024.    HISTORY: NG confirmation    TECHNIQUE: Frontal view of the abdomen.    FINDINGS: Enteric tube with tip and sidehole within the stomach. No  abnormally dilated loops of bowel. Air-filled loop of large bowel. " No  pneumatosis or portal venous gas.       Impression    IMPRESSION: Enteric tube tip and sidehole project over the stomach.    I have personally reviewed the examination and initial interpretation  and I agree with the findings.    BORIS PAYTON MD         SYSTEM ID:  [id number]   XR Chest Port 1 View    Narrative    Exam: XR CHEST PORT 1 VIEW, 12/27/2024 1:00 AM    Comparison: 12/26/2024    History: Monitor pulmonary opacities    Findings:  Portable AP view of the semiupright chest. Interval extubation.  Gastric tube with tip and sidehole in the stomach. Right upper  extremity PICC line with tip at the cavoatrial junction.    Trachea is midline. Stable cardiomegaly. Similar perihilar opacities.  Similar right pleural effusion. No pneumothorax.      Impression    Impression:   1. Interval extubation. Right upper extremity PICC line at the  cavoatrial junction.  2. Similar size right pleural effusion. No significant change in  perihilar mixed opacities.    I have personally reviewed the examination and initial interpretation  and I agree with the findings.    LAURYN FOURNIER MD         SYSTEM ID:  [id number]

## 2024-12-27 NOTE — PROGRESS NOTES
SURGICAL ICU PROGRESS NOTE  12/27/2024      PRIMARY TEAM: EGS  PRIMARY PHYSICIAN: Dr. Kate     REASON FOR CRITICAL CARE ADMISSION: Vent management   CONSULTING PHYSICIAN: Dr. Mathis     ASSESSMENT: 59 year old male with history significant for recurrent SBO, poorly controlled T2DM, HIV on ART, and recent closed loop obstruction s/p 12/20/2024 ex lap with small bowel stricture resection and primary anastomosis since complicated by free fluid and free air in the abdomen concerning for anastomotic leak.  Patient taken back to the OR on the evening of 12/24 for exploratory laparotomy, small bowel resection, and anastomotic revision. Patient has 125 cm of bowel remaining proximally and 115 cm remaining distally after resection.  Patient was unable to be extubated in PACU, primarily due to neurologic concerns, and was transferred to the SICU on minimal vent settings.      Updates:   - focusing on weaning off precedex drip and transitioning to alternative sedation medications  - scheduled seroquel added  - PRN seroquel also available  - precedex capped at current rate for weaning  - 5 melatonin added at night  - starting enteral medications    PLAN:  Neuro/psych/pain/sedation:  #Acute postop pain  #Altered mental status, acute   #History of anxiety and panic disorder  #History of major depression  #History of methamphetamine abuse (currently sober)  #History of housing instability (currently housed)  #History of left occipital craniotomy and underlying encephalomalacia, stable  -Monitor neurological status. Notify the MD for any acute changes in exam.  -Home meds reviewed, no active psych meds listed  -Delirium precautions  -Soft restraints prn, discontinue   - aim for up and OOB  -RASS 0 to -1  -Wean precedex gtt   -Seroquel 25/25 BID, 50 HS   -melatonin 5 mg HS   -seroquel 25 mg q8h prn     Pulmonary care:   #History of allergic rhinitis  #History of pulmonary nodules  -Extubated 12/26 AM  -Supplemental oxygen to  keep saturation above 92 %.  -Incentive spirometer every 15- 30 minutes when awake.      Cardiovascular:    # Hx HTN  -Monitor hemodynamic status.   -No ongoing pressor requirements  -Goal normotension (page service for SBP's greater than 160)  - PRN labetalol available     GI care:   #Recurrent SBO  #Status post ex lap x 2 and new small bowel anastomosis   #History of GERD  #High risk for ileus  -Okay for enteral meds via NG tube  -Maintain NG tube for durable enteral access  -keep bridled, okay to clamp for meds  -Scheduled NG tube flush orders in place  -Midline incision cares per primary team (wet-to-dry packing, first change by service)  -PPI prophylaxis with IV protonix   -Simethicone and Tums available once able to take p.o.      Fluids/ Electrolytes/ Nutrition:   -Discontinued maintenance fluids iso TPN  -ICU electrolyte replacement protocol (high)  -Continue TPN  -Nutrition consulted. Appreciate recs     Renal/ Fluid Balance:    #Acute kidney injury  #Uremia  #Metabolic acidosis, high anion gap  -Urine output adequate this AM   -Avoid nephrotoxins  -Daily BMP      Endocrine:    #Stress hyperglycemia  #T2DM, poorly controlled  -Insulin drip and 10 units Lantus ordered for diabetes management  -Goal blood sugar less than 180  -Well controlled currently      ID/ Antibiotics:  #Small bowel perforation with gross intra-abdominal contamination  #Neutropenia, acute  #Multifocal groundglass opacities in lungs concerning for infection  #Hx HIV  -COVID-negative  -UA noninfectious  -Daily chest x-ray  -Continuing daily blood cultures  -Transplant ID consult for assistance with anti-retroviral therapy management   -> recommend continuing Biktarvy, reordered   -> Continue IV Meropenem, end of treatment TBD (appreciate ID reccs)    Cultures:   -12/24 blood cx -> S. Gallolyticus  -12/24 abdominal cx (Aerobic) -> 4+ E. Cloacae, S. Gallolyticus, S. Anginosus, C. Freundii  -12/24 abdominal cx (Anaerobic) -> 4+ C.  Perfringens  -12/24 fungal cx -> Yeast  - 12/25 blood cx -> NGTD  - 12/26 blood cx -> NGTD      Heme:     # Acute postop anemia  -Lovenox DVT prophylaxis  -Hemoglobin 9.6 from 9.1  -Platelets within normal limits  -Daily CBC      Prophylaxis:    -Mechanical prophylaxis for DVT.   -Lovenox DVT prophylaxis resumed 12/25  -Protonix GI prophylaxis      MSK:    -PT and OT consulted. Appreciate recs.      Lines/ tubes/ drains:  -Right PIV  -PICC triple lumen (12/26-)  -NG (12/24-)      Disposition:  -Surgical ICU.    Patient seen and discussed with staff, Dr. Kate.    Bere Howe MD   PGY-2 General Surgery   Pager [phone]  Vocera    ====================================    TODAY'S SUBJECTIVE/INTERVAL HISTORY:   NAEO. Restless overnight, treated with Precedex. Responds to questions but unintelligible. Somewhat follows commands. Unable to report if he has passed gas. Overnight assistant has not noticed if passed gas.    OBJECTIVE:     Temp:  [97.6  F (36.4  C)-98.5  F (36.9  C)] 98.2  F (36.8  C)  Pulse:  [67-85] 68  Resp:  [15-30] 18  BP: ()/() 113/69  MAP:  [79 mmHg-87 mmHg] 81 mmHg  Arterial Line BP: ()/(61-75) 97/71  SpO2:  [90 %-100 %] 98 %  FiO2 (%): 40 %, Resp: 18, Vent Mode: CMV/AC, Resp Rate (Set): 12 breaths/min, Tidal Volume (Set, mL): 450 mL, PEEP (cm H2O): 5 cmH2O, Resp Rate (Set): 12 breaths/min, Tidal Volume (Set, mL): 450 mL, PEEP (cm H2O): 5 cmH2O    I/O last 3 completed shifts:  In: 1360.92 [I.V.:573.72; NG/GT:50]  Out: 2440 [Urine:2240; Emesis/NG output:200]    Gen: Conversational adult male in NAD, mildly disoriented, calm  Eyes: Nonicteric  ENT: Mucous Membranes Moist  Pulm: Nonlabored Breathing on RA  CV: RRR on telemetry  Abd: Soft, appropriately tender, mildly distended, midline incision CDI, abdominal binder in place  Skin: WWP  Extremities: MAEE, soft restraints in place  Neuro: CN grossly intact  Psych: Appropriate, conversational    LABS:   Arterial Blood Gases   Recent  Labs   Lab 12/25/24  0816 12/25/24  0432 12/25/24  0024 12/24/24  2312   PH 7.45 7.44 7.38 7.36   PCO2 28* 30* 32* 33*   PO2 126* 78* 92 89   HCO3 20* 20* 19* 19*     Complete Blood Count   Recent Labs   Lab 12/27/24  0358 12/26/24  2354 12/26/24  0810 12/25/24  0430 12/24/24  2350   WBC 6.9  --  5.8 3.9* 2.4*   HGB 9.6*  --  9.1* 10.9* 11.0*    183 157 247 245     Basic Metabolic Panel  Recent Labs   Lab 12/27/24  0954 12/27/24  0745 12/27/24  0655 12/27/24  0552 12/27/24  0403 12/27/24  0358 12/26/24  0558 12/26/24  0437 12/25/24  1216 12/25/24  1152 12/25/24  0544 12/25/24  0430 12/25/24  0140 12/24/24  2350   NA  --   --   --   --   --  148*  --  149*  --   --   --  148*  --  145   POTASSIUM  --   --   --   --   --  3.8  --  3.5  --  3.8  --  3.3*  --  3.7   CHLORIDE  --   --   --   --   --  120*  --  122*  --   --   --  120*  --  116*   CO2  --   --   --   --   --  20*  --  19*  --   --   --  18*  --  17*   BUN  --   --   --   --   --  45.7*  --  42.8*  --   --   --  37.6*  --  43.8*   CR  --   --   --   --   --  0.83  --  0.95  --   --   --  1.22*  --  1.29*   * 119* 126* 146*   < > 191*   < > 156*   < >  --    < > 99   < > 200*    < > = values in this interval not displayed.     Liver Function Tests  Recent Labs   Lab 12/26/24  0437 12/21/24  0921 12/20/24  1506   AST 59* 18 23   ALT 11 <5 12   ALKPHOS 65 72 132   BILITOTAL 0.5 1.3* 0.6   ALBUMIN 2.0* 3.4* 4.1   INR 1.16*  --  0.97     Pancreatic Enzymes  Recent Labs   Lab 12/20/24  1506   LIPASE 57     Coagulation Profile  Recent Labs   Lab 12/26/24  0437 12/20/24  1506   INR 1.16* 0.97   PTT  --  28     IMAGING:   Recent Results (from the past 24 hours)   XR Chest Port 1 View    Impression    RESIDENT PRELIMINARY INTERPRETATION  Impression:   1. Interval extubation. Right upper extremity PICC line at the  cavoatrial junction.  2. Similar size right pleural effusion. No significant change in  perihilar mixed opacities.

## 2024-12-27 NOTE — PLAN OF CARE
Goal Outcome Evaluation:      Plan of Care Reviewed With: patient    Overall Patient Progress: no changeOverall Patient Progress: no change    Outcome Evaluation: Extubated but confused and agitated    Neuro: Disoriented x3, confused, agitated/combative at times - Zyprexa x1 given. Restraints restarted for safety at this time. Sitter at bedside.   CV: HR 60-100s. SBP 90-100s. Afebrile.   Pulm: Clear lung sounds. 4L NC.  GI: OG bridles from OR - LIS; orders to flush q8. Strict NPO except okay for birtarvy HIV med.   : Urinary catheter removed - due to void.  Surg: Abdominal packing - changed early this AM by surgical team. Abdominal binder. Plan for RN to change going forward - see wound care order.   IV/Gtt: PIV. RIJ pulled. PICC placed. TPN, insulin, precedex. Attempted to wean precedex - pt became agitated/combative with staff, titrated back up.      Plan: Lipids overnight. Attempt to wean precedex & use PRNs as able.         Zluma Zarco, RN

## 2024-12-27 NOTE — PROGRESS NOTES
"Emergency General Surgery Progress Note     S: Patient denies passing any gas or bowel movement, however no nausea or vomiting.  Ongoing abdominal pain.  Potts removed yesterday, has voided since.     O:  /69   Pulse 68   Temp 98.2  F (36.8  C) (Axillary)   Resp 18   Ht 1.626 m (5' 4\")   Wt 83.3 kg (183 lb 10.3 oz)   SpO2 98%   BMI 31.52 kg/m    General:Alert, answering questions appropriately, mental status similar to baseline   CV: RRR  Resp: No increased work of breathing  Abd: soft, mildly distended, mildly tender to palpation, no peritoneal signs   - midline incision open with fascia intact, very minimal s/s drainage on kerlix packing.  NG tube in situ.   Ext wwp, restraints in place     UOP 1.8L    ml   Net +1.2L/24h (+5L since admission)     Cr 0.83 from 0.95  WBC 6.9 from 5.8   Hgb 9.6 from 9.1    A/P: 59yoM with h/o T2DM, HIV on ART, and recent admission from 12/17-12/19 for SBO managed successfully with bowel rest/GGC who presented to ED again on 12/20 for recurrent SBO s/p ex lap/SBR on 12/20. Now s/p ex-lap/SBR/re-anastomosis for anastomotic leak on 12/24. Extubated 12/26 AM.     POD#3- no longer tachycardic postoperatively. JULIANNE and leukopenia has resolved. Dressing changed by EGS team this AM.  Continues to be on Precedex.  Awaiting return of bowel function yet.    - keep NPO, NGT while AROBF  - ok for TPN   - zosyn started 12/24 - cont for at least 4 days. High risk of abscess  - strict I/O  - RN to change midline dressing daily & PRN with moistened kerlix and gauze/ABD (orders placed)  - dispo: ok to transfer out of SICU once off precedex    Seen with chief resident who will d/w staff     Haley Sam  PGY-1  General Surgery   " Size 6 Tracheostomy place in OR. Sending obturator and inner cannula with patient.

## 2024-12-28 ENCOUNTER — APPOINTMENT (OUTPATIENT)
Dept: GENERAL RADIOLOGY | Facility: CLINIC | Age: 59
End: 2024-12-28
Payer: COMMERCIAL

## 2024-12-28 LAB
ANION GAP SERPL CALCULATED.3IONS-SCNC: 8 MMOL/L (ref 7–15)
BACTERIA BLD CULT: ABNORMAL
BUN SERPL-MCNC: 27.9 MG/DL (ref 8–23)
CALCIUM SERPL-MCNC: 8.3 MG/DL (ref 8.8–10.4)
CHLORIDE SERPL-SCNC: 115 MMOL/L (ref 98–107)
CREAT SERPL-MCNC: 0.71 MG/DL (ref 0.67–1.17)
EGFRCR SERPLBLD CKD-EPI 2021: >90 ML/MIN/1.73M2
ERYTHROCYTE [DISTWIDTH] IN BLOOD BY AUTOMATED COUNT: 14.6 % (ref 10–15)
GLUCOSE BLDC GLUCOMTR-MCNC: 112 MG/DL (ref 70–99)
GLUCOSE BLDC GLUCOMTR-MCNC: 117 MG/DL (ref 70–99)
GLUCOSE BLDC GLUCOMTR-MCNC: 117 MG/DL (ref 70–99)
GLUCOSE BLDC GLUCOMTR-MCNC: 121 MG/DL (ref 70–99)
GLUCOSE BLDC GLUCOMTR-MCNC: 124 MG/DL (ref 70–99)
GLUCOSE BLDC GLUCOMTR-MCNC: 124 MG/DL (ref 70–99)
GLUCOSE BLDC GLUCOMTR-MCNC: 127 MG/DL (ref 70–99)
GLUCOSE BLDC GLUCOMTR-MCNC: 132 MG/DL (ref 70–99)
GLUCOSE BLDC GLUCOMTR-MCNC: 140 MG/DL (ref 70–99)
GLUCOSE BLDC GLUCOMTR-MCNC: 144 MG/DL (ref 70–99)
GLUCOSE BLDC GLUCOMTR-MCNC: 147 MG/DL (ref 70–99)
GLUCOSE BLDC GLUCOMTR-MCNC: 150 MG/DL (ref 70–99)
GLUCOSE BLDC GLUCOMTR-MCNC: 151 MG/DL (ref 70–99)
GLUCOSE BLDC GLUCOMTR-MCNC: 161 MG/DL (ref 70–99)
GLUCOSE BLDC GLUCOMTR-MCNC: 169 MG/DL (ref 70–99)
GLUCOSE BLDC GLUCOMTR-MCNC: 202 MG/DL (ref 70–99)
GLUCOSE SERPL-MCNC: 150 MG/DL (ref 70–99)
HCO3 SERPL-SCNC: 24 MMOL/L (ref 22–29)
HCT VFR BLD AUTO: 31.3 % (ref 40–53)
HGB BLD-MCNC: 10.1 G/DL (ref 13.3–17.7)
MAGNESIUM SERPL-MCNC: 1.9 MG/DL (ref 1.7–2.3)
MCH RBC QN AUTO: 28.2 PG (ref 26.5–33)
MCHC RBC AUTO-ENTMCNC: 32.3 G/DL (ref 31.5–36.5)
MCV RBC AUTO: 87 FL (ref 78–100)
PHOSPHATE SERPL-MCNC: 2.6 MG/DL (ref 2.5–4.5)
PLATELET # BLD AUTO: 193 10E3/UL (ref 150–450)
POTASSIUM SERPL-SCNC: 2.9 MMOL/L (ref 3.4–5.3)
POTASSIUM SERPL-SCNC: 3.4 MMOL/L (ref 3.4–5.3)
RBC # BLD AUTO: 3.58 10E6/UL (ref 4.4–5.9)
SODIUM SERPL-SCNC: 147 MMOL/L (ref 135–145)
WBC # BLD AUTO: 7 10E3/UL (ref 4–11)

## 2024-12-28 PROCEDURE — 258N000003 HC RX IP 258 OP 636

## 2024-12-28 PROCEDURE — 120N000005 HC R&B MS OVERFLOW UMMC

## 2024-12-28 PROCEDURE — 250N000013 HC RX MED GY IP 250 OP 250 PS 637: Performed by: STUDENT IN AN ORGANIZED HEALTH CARE EDUCATION/TRAINING PROGRAM

## 2024-12-28 PROCEDURE — 85041 AUTOMATED RBC COUNT: CPT | Performed by: STUDENT IN AN ORGANIZED HEALTH CARE EDUCATION/TRAINING PROGRAM

## 2024-12-28 PROCEDURE — 71045 X-RAY EXAM CHEST 1 VIEW: CPT

## 2024-12-28 PROCEDURE — 80048 BASIC METABOLIC PNL TOTAL CA: CPT | Performed by: STUDENT IN AN ORGANIZED HEALTH CARE EDUCATION/TRAINING PROGRAM

## 2024-12-28 PROCEDURE — 250N000013 HC RX MED GY IP 250 OP 250 PS 637: Performed by: PHYSICIAN ASSISTANT

## 2024-12-28 PROCEDURE — 250N000013 HC RX MED GY IP 250 OP 250 PS 637: Performed by: SURGERY

## 2024-12-28 PROCEDURE — 84100 ASSAY OF PHOSPHORUS: CPT | Performed by: INTERNAL MEDICINE

## 2024-12-28 PROCEDURE — 85018 HEMOGLOBIN: CPT | Performed by: STUDENT IN AN ORGANIZED HEALTH CARE EDUCATION/TRAINING PROGRAM

## 2024-12-28 PROCEDURE — 250N000013 HC RX MED GY IP 250 OP 250 PS 637: Performed by: NURSE PRACTITIONER

## 2024-12-28 PROCEDURE — 71045 X-RAY EXAM CHEST 1 VIEW: CPT | Mod: 26 | Performed by: RADIOLOGY

## 2024-12-28 PROCEDURE — 84132 ASSAY OF SERUM POTASSIUM: CPT | Performed by: SURGERY

## 2024-12-28 PROCEDURE — 250N000011 HC RX IP 250 OP 636

## 2024-12-28 PROCEDURE — 83735 ASSAY OF MAGNESIUM: CPT | Performed by: SURGERY

## 2024-12-28 PROCEDURE — 250N000011 HC RX IP 250 OP 636: Performed by: STUDENT IN AN ORGANIZED HEALTH CARE EDUCATION/TRAINING PROGRAM

## 2024-12-28 PROCEDURE — 99233 SBSQ HOSP IP/OBS HIGH 50: CPT | Performed by: PHYSICIAN ASSISTANT

## 2024-12-28 PROCEDURE — B4185 PARENTERAL SOL 10 GM LIPIDS: HCPCS | Performed by: SURGERY

## 2024-12-28 PROCEDURE — 250N000011 HC RX IP 250 OP 636: Performed by: SURGERY

## 2024-12-28 PROCEDURE — 250N000009 HC RX 250: Performed by: NURSE PRACTITIONER

## 2024-12-28 PROCEDURE — 250N000009 HC RX 250: Performed by: SURGERY

## 2024-12-28 PROCEDURE — 82374 ASSAY BLOOD CARBON DIOXIDE: CPT | Performed by: STUDENT IN AN ORGANIZED HEALTH CARE EDUCATION/TRAINING PROGRAM

## 2024-12-28 RX ORDER — POTASSIUM CHLORIDE 29.8 MG/ML
20 INJECTION INTRAVENOUS
Status: COMPLETED | OUTPATIENT
Start: 2024-12-28 | End: 2024-12-28

## 2024-12-28 RX ORDER — OXYCODONE HYDROCHLORIDE 5 MG/1
5 TABLET ORAL EVERY 4 HOURS PRN
Status: DISCONTINUED | OUTPATIENT
Start: 2024-12-28 | End: 2025-01-01

## 2024-12-28 RX ADMIN — QUETIAPINE FUMARATE 50 MG: 50 TABLET ORAL at 21:28

## 2024-12-28 RX ADMIN — POTASSIUM CHLORIDE 20 MEQ: 29.8 INJECTION INTRAVENOUS at 07:51

## 2024-12-28 RX ADMIN — CEFEPIME HYDROCHLORIDE 2 G: 2 INJECTION, POWDER, FOR SOLUTION INTRAVENOUS at 12:24

## 2024-12-28 RX ADMIN — ACETAMINOPHEN 975 MG: 325 TABLET, FILM COATED ORAL at 13:30

## 2024-12-28 RX ADMIN — POTASSIUM CHLORIDE 20 MEQ: 29.8 INJECTION INTRAVENOUS at 11:07

## 2024-12-28 RX ADMIN — Medication 5 MG: at 17:54

## 2024-12-28 RX ADMIN — POTASSIUM PHOSPHATE, MONOBASIC POTASSIUM PHOSPHATE, DIBASIC: 224; 236 INJECTION, SOLUTION, CONCENTRATE INTRAVENOUS at 21:29

## 2024-12-28 RX ADMIN — HYDROMORPHONE HYDROCHLORIDE 0.4 MG: 0.2 INJECTION, SOLUTION INTRAMUSCULAR; INTRAVENOUS; SUBCUTANEOUS at 07:52

## 2024-12-28 RX ADMIN — MICAFUNGIN 100 MG: 20 INJECTION, POWDER, LYOPHILIZED, FOR SOLUTION INTRAVENOUS at 18:43

## 2024-12-28 RX ADMIN — ACETAMINOPHEN 975 MG: 325 TABLET, FILM COATED ORAL at 21:28

## 2024-12-28 RX ADMIN — ACETAMINOPHEN 975 MG: 325 TABLET, FILM COATED ORAL at 06:10

## 2024-12-28 RX ADMIN — HYDROMORPHONE HYDROCHLORIDE 0.4 MG: 0.2 INJECTION, SOLUTION INTRAMUSCULAR; INTRAVENOUS; SUBCUTANEOUS at 03:35

## 2024-12-28 RX ADMIN — METRONIDAZOLE 500 MG: 500 INJECTION, SOLUTION INTRAVENOUS at 03:10

## 2024-12-28 RX ADMIN — QUETIAPINE FUMARATE 25 MG: 25 TABLET ORAL at 06:10

## 2024-12-28 RX ADMIN — OXYCODONE HYDROCHLORIDE 5 MG: 5 TABLET ORAL at 13:34

## 2024-12-28 RX ADMIN — POTASSIUM CHLORIDE 20 MEQ: 29.8 INJECTION, SOLUTION INTRAVENOUS at 16:27

## 2024-12-28 RX ADMIN — HYDROMORPHONE HYDROCHLORIDE 0.4 MG: 0.2 INJECTION, SOLUTION INTRAMUSCULAR; INTRAVENOUS; SUBCUTANEOUS at 16:27

## 2024-12-28 RX ADMIN — QUETIAPINE FUMARATE 25 MG: 25 TABLET ORAL at 13:29

## 2024-12-28 RX ADMIN — CEFEPIME HYDROCHLORIDE 2 G: 2 INJECTION, POWDER, FOR SOLUTION INTRAVENOUS at 02:02

## 2024-12-28 RX ADMIN — CEFEPIME HYDROCHLORIDE 2 G: 2 INJECTION, POWDER, FOR SOLUTION INTRAVENOUS at 17:53

## 2024-12-28 RX ADMIN — PANTOPRAZOLE SODIUM 40 MG: 40 INJECTION, POWDER, FOR SOLUTION INTRAVENOUS at 07:51

## 2024-12-28 RX ADMIN — HYDROMORPHONE HYDROCHLORIDE 0.4 MG: 0.2 INJECTION, SOLUTION INTRAMUSCULAR; INTRAVENOUS; SUBCUTANEOUS at 11:16

## 2024-12-28 RX ADMIN — OLIVE OIL AND SOYBEAN OIL 250 ML: 16; 4 INJECTION, EMULSION INTRAVENOUS at 21:28

## 2024-12-28 RX ADMIN — BICTEGRAVIR SODIUM, EMTRICITABINE, AND TENOFOVIR ALAFENAMIDE FUMARATE 1 TABLET: 50; 200; 25 TABLET ORAL at 13:30

## 2024-12-28 RX ADMIN — POTASSIUM CHLORIDE 20 MEQ: 29.8 INJECTION, SOLUTION INTRAVENOUS at 15:35

## 2024-12-28 RX ADMIN — LIDOCAINE 4% 1 PATCH: 40 PATCH TOPICAL at 04:06

## 2024-12-28 RX ADMIN — HYDROMORPHONE HYDROCHLORIDE 0.4 MG: 0.2 INJECTION, SOLUTION INTRAMUSCULAR; INTRAVENOUS; SUBCUTANEOUS at 05:02

## 2024-12-28 RX ADMIN — ENOXAPARIN SODIUM 40 MG: 40 INJECTION SUBCUTANEOUS at 21:28

## 2024-12-28 RX ADMIN — METRONIDAZOLE 500 MG: 500 INJECTION, SOLUTION INTRAVENOUS at 13:31

## 2024-12-28 RX ADMIN — METRONIDAZOLE 500 MG: 500 INJECTION, SOLUTION INTRAVENOUS at 21:28

## 2024-12-28 ASSESSMENT — ACTIVITIES OF DAILY LIVING (ADL)
ADLS_ACUITY_SCORE: 52
ADLS_ACUITY_SCORE: 52
ADLS_ACUITY_SCORE: 47
ADLS_ACUITY_SCORE: 47
ADLS_ACUITY_SCORE: 52
ADLS_ACUITY_SCORE: 52
ADLS_ACUITY_SCORE: 47
ADLS_ACUITY_SCORE: 52
ADLS_ACUITY_SCORE: 51
ADLS_ACUITY_SCORE: 52
ADLS_ACUITY_SCORE: 52
ADLS_ACUITY_SCORE: 47
ADLS_ACUITY_SCORE: 52
ADLS_ACUITY_SCORE: 47
ADLS_ACUITY_SCORE: 51
ADLS_ACUITY_SCORE: 47
ADLS_ACUITY_SCORE: 52
ADLS_ACUITY_SCORE: 47
ADLS_ACUITY_SCORE: 52

## 2024-12-28 NOTE — PLAN OF CARE
"Goal Outcome Evaluation:      Plan of Care Reviewed With: patient    Overall Patient Progress: improvingOverall Patient Progress: improving     Goal Outcome Evaluation:        Cares [id number]     Status: adx:  12/24 small bowel obstruction  ISO: contact for MRSA  Diet: NPO   Vitals: BP (!) 145/67 (BP Location: Left arm, Cuff Size: Adult Regular)   Pulse 92   Temp 98.8  F (37.1  C) (Oral)   Resp 14   Ht 1.626 m (5' 4\")   Wt 83.3 kg (183 lb 10.3 oz)   SpO2 100%   BMI 31.52 kg/m  2LNC  Neuro's: Axox4,[place] speaking, moves all extremities  Resp/cardiac: 2LNC sating 95% + no SOB reported  Endocrine:insuling gtt alg 4  GI/: primofit AUOP, LBM: PTA. + flatus  Skin: scattred brusing, ABD incision C/D/I  Pain/nausea: denies pain this shift  Activity: not OOB this shift  IV/drains: R TL PICC, L PIV, NG to LIS  Labs/Electrolytes: K 2.9 MD notified and K ordered  Drips:insulin gtt alg 4  Plan: continue POC  Updates this shift: no acute changes this shift        "

## 2024-12-28 NOTE — PROGRESS NOTES
"Emergency General Surgery Progress Note     S: now passing gas, had a bowel movement last night. Pain is well controlled. No nausea/vomiting     O:  /71   Pulse 83   Temp 98.2  F (36.8  C) (Oral)   Resp 24   Ht 1.626 m (5' 4\")   Wt 83.3 kg (183 lb 10.3 oz)   SpO2 94%   BMI 31.52 kg/m    General:Alert, answering questions appropriately, mental status similar to baseline   CV: RRR  Resp: No increased work of breathing  Abd: soft, NT mildly tender to palpation, no peritoneal signs   - midline incision open with fascia intact, very minimal s/s drainage on kerlix packing.    Ext wwp, restraints in place     K 2.9   Cr 0.71   WBC 7.0   Hgb 10.1   Hematocrit 31.3     A/P: 59yoM with h/o T2DM, HIV on ART, and recent admission from 12/17-12/19 for SBO managed successfully with bowel rest/GGC who presented to ED again on 12/20 for recurrent SBO s/p ex lap/SBR on 12/20. Now s/p ex-lap/SBR/re-anastomosis for anastomotic leak on 12/24. Extubated 12/26 AM.     POD#4- EGS changed dressing this AM. Having antegrade function. We removed NGT during rounds.     - CLD, ADAT   - Cont TPN  - Replete electrolytes   - Appreciate ID recs   - Strict I/O  - RN to change midline dressing daily & PRN with moistened kerlix and gauze/ABD (orders placed)  - Dispo: ok to transfer to regular floor today.     Seen with chief resident who will d/w staff     Neris Parikh, DO  General Surgery PGY-2   " 36.7

## 2024-12-28 NOTE — PROGRESS NOTES
SURGICAL ICU PROGRESS NOTE  12/28/2024      PRIMARY TEAM: EGS  PRIMARY PHYSICIAN: Dr. Kate     REASON FOR CRITICAL CARE ADMISSION: Vent management   CONSULTING PHYSICIAN: Dr. Mathis     ASSESSMENT: 59 year old male with history significant for recurrent SBO, poorly controlled T2DM, HIV on ART, and recent closed loop obstruction s/p 12/20/2024 ex lap with small bowel stricture resection and primary anastomosis since complicated by free fluid and free air in the abdomen concerning for anastomotic leak.  Patient taken back to the OR on the evening of 12/24 for exploratory laparotomy, small bowel resection, and anastomotic revision. Patient has 125 cm of bowel remaining proximally and 115 cm remaining distally after resection.  Patient was unable to be extubated in PACU, primarily due to neurologic concerns, and was transferred to the SICU on minimal vent settings.        TODAY  - Remains off precedex  - Lantus 20 daily  - CXR with increased on the right, no fluid to drain.  - Transfer to the floor  - Discontinue PRN Zyprexa, use Seroquel if needed      PLAN:  Neuro/psych/pain/sedation:  #Acute postop pain  #Altered mental status, acute   #History of anxiety and panic disorder  #History of major depression  #History of methamphetamine abuse (currently sober)  #History of housing instability (currently housed)  #History of left occipital craniotomy and underlying encephalomalacia, stable  -Monitor neurological status. Notify the MD for any acute changes in exam.  -Home meds reviewed, no active psych meds listed  -Delirium precautions   -Scheduled: Seroquel 25/25 BID, 50 HS   - PRN Seroquel 25 q8hrs    - Melatonin 5 mg HS    Pulmonary care:   #History of allergic rhinitis  #History of pulmonary nodules  -Extubated 12/26 AM  -Supplemental oxygen to keep saturation above 92 %.    # Right Pleural effusion vs atelectasis  - US today wihtout significant fluid, highly suspect atelectasis.  - IR reported doubt anything to  drain based on CXR. Encourage IS when awake.      Cardiovascular:    # Hx HTN  -Monitor hemodynamic status.   - Off pressors, goal normotension, SBP <160  - PRN Labetalol     GI Care/Nutrition  #Recurrent SBO  #Status post ex lap x 2 and new small bowel anastomosis   -Okay for enteral meds via NG tube  - Nutrition consulted, on TPN  - Diet: Clears  -Maintain NG tube for durable enteral access  -keep bridled, okay to clamp for meds  -Scheduled NG tube flush orders in place  -Midline incision cares per primary team (wet-to-dry packing, first change by service)    -Simethicone and Tums available once able to take p.o.    # GERD  - PPI     Fluids/ Electrolytes/ Nutrition:   -ICU electrolyte replacement protocol (high)  -on TPN       Renal/ Fluid Balance:    #Acute kidney injury  - Monitor I&Os, avoid nephrotoxins  - Continue to monitor      Endocrine:    #Stress hyperglycemia  #T2DM, poorly controlled  - Insulin drip (while on TPN)  - Lantus 20 units Lantus daily   -Goal glucose <180        ID/ Antibiotics:  #Small bowel perforation with gross intra-abdominal contamination  #Neutropenia, acute  #Multifocal groundglass opacities in lungs concerning for infection  #Hx HIV  -COVID-negative  -UA noninfectious  -Daily chest x-ray  -Continuing daily blood cultures  -Transplant ID consult for assistance with anti-retroviral therapy management   -> recommend continuing Biktarvy, reordered   -> Continue IV Meropenem, end of treatment TBD (appreciate ID reccs)\\  - Cefepime, flagyl, and micafungin started yesterday    Cultures:   -12/24 blood cx -> S. Gallolyticus  -12/24 abdominal cx (Aerobic) -> 4+ E. Cloacae, S. Gallolyticus, S. Anginosus, C. Freundii  -12/24 abdominal cx (Anaerobic) -> 4+ C. Perfringens  -12/24 fungal cx -> Yeast  - 12/25 blood cx -> NGTD  - 12/26 blood cx -> NGTD      Heme:     # Acute postop anemia  - No concern for bleeding at this time. Will continue to monitor. Goal Hgb >7.0     MSK:    -PT and OT  consulted.      Prophylaxis:    -DVT: Lovenox and mechanical  -GI: PPI      Lines/ tubes/ drains:  -Right PIV  -PICC triple lumen (12/26-)  -NG (12/24-)      Disposition: Surgical ICU.    Patient seen and discussed with staff, Dr. Kate. 35 minutes of time spent in the management of this patient. Excluding procedure performed same day.     César Ruggiero PA-C    ====================================    TODAY'S SUBJECTIVE/INTERVAL HISTORY:   No acute events overnight. Patient remains restless. Did have small aspiration event this afternoon and desatted but recovered.     OBJECTIVE:     Temp:  [98.1  F (36.7  C)-98.8  F (37.1  C)] 98.2  F (36.8  C)  Pulse:  [68-94] 83  Resp:  [14-26] 24  BP: (105-145)/() 137/71  SpO2:  [94 %-100 %] 94 %  Resp: 24    I/O last 3 completed shifts:  In: 2595.27 [I.V.:791.67; NG/GT:235]  Out: 4375 [Urine:3125; Emesis/NG output:1250]    Gen: Conversational adult male in NAD, mildly disoriented, calm  Eyes: Nonicteric  ENT: Mucous Membranes Moist  Pulm: Nonlabored Breathing on RA  CV: RRR on telemetry  Abd: Soft, appropriately tender, mildly distended, midline incision CDI, abdominal binder in place  Skin: WWP  Extremities: MAEE, soft restraints in place  Neuro: CN grossly intact  Psych: Appropriate, conversational    LABS:   Arterial Blood Gases   Recent Labs   Lab 12/25/24  0816 12/25/24  0432 12/25/24  0024 12/24/24  2312   PH 7.45 7.44 7.38 7.36   PCO2 28* 30* 32* 33*   PO2 126* 78* 92 89   HCO3 20* 20* 19* 19*     Complete Blood Count   Recent Labs   Lab 12/28/24  0416 12/27/24  0358 12/26/24  2354 12/26/24  0810 12/25/24  0430   WBC 7.0 6.9  --  5.8 3.9*   HGB 10.1* 9.6*  --  9.1* 10.9*    186 183 157 247     Basic Metabolic Panel  Recent Labs   Lab 12/28/24  0702 12/28/24  0613 12/28/24  0502 12/28/24  0416 12/27/24  0403 12/27/24  0358 12/26/24  0558 12/26/24  0437 12/25/24  1216 12/25/24  1152 12/25/24  0544 12/25/24  0430   NA  --   --   --  147*  --  148*  --  149*   --   --   --  148*   POTASSIUM  --   --   --  2.9*  --  3.8  --  3.5  --  3.8  --  3.3*   CHLORIDE  --   --   --  115*  --  120*  --  122*  --   --   --  120*   CO2  --   --   --  24  --  20*  --  19*  --   --   --  18*   BUN  --   --   --  27.9*  --  45.7*  --  42.8*  --   --   --  37.6*   CR  --   --   --  0.71  --  0.83  --  0.95  --   --   --  1.22*   * 112* 151* 150*   < > 191*   < > 156*   < >  --    < > 99    < > = values in this interval not displayed.     Liver Function Tests  Recent Labs   Lab 12/26/24  0437 12/21/24  0921   AST 59* 18   ALT 11 <5   ALKPHOS 65 72   BILITOTAL 0.5 1.3*   ALBUMIN 2.0* 3.4*   INR 1.16*  --      Pancreatic Enzymes  No lab results found in last 7 days.    Coagulation Profile  Recent Labs   Lab 12/26/24  0437   INR 1.16*     IMAGING:   Recent Results (from the past 24 hours)   XR Chest Port 1 View    Impression    RESIDENT PRELIMINARY INTERPRETATION  Impression:   1. Near complete opacification of the right lung with large right  pleural effusion. This favors compressive atelectasis, though  infection cannot be ruled out.  2. Stable cardiomegaly.

## 2024-12-28 NOTE — PLAN OF CARE
Problem: Pain Acute  Goal: Optimal Pain Control and Function  Intervention: Optimize Psychosocial Wellbeing  Recent Flowsheet Documentation  Taken 12/28/2024 1600 by Zenaida Garcia, RN  Spiritual Activities Assistance: affirmation provided  Supportive Measures:   active listening utilized   decision-making supported    Neuro: A & O x4 w/ intermittent confusion. 5+ equal strength in all extremities. Perrl.  at bedside. Intermittent frustration/agitation with sitter.  Pain: 7-9 abdominal pain throughout the day. Dilaudid given x3, Oxycodone once.   Cardiac: SR, HR 80-90s w/ no noted ectopy. -150/80s. Afebrile.   Resp: 2L-4L NC. Lungs diminished. Refused IS. Aspiration episode on iced tea- desat to 80% but recovered after <5min. MD aware.   GI: Clear liq, not currently advancing r/t aspiration episode. BM x2 today.  : Voiding adequatetly  LADs: PIV, PICC  Gtts:  -insulin gtt 2-7u/hr (alg 4)  Integ:  -Midline incision W-D (changed by gen surg MD this AM) + ABD + binder, small amt of serous drainage  Labs:  -K rechecked and replaced per protocol      Plan: Continue to monitor and update MD with changes. Tx to floor when bed becomes available.

## 2024-12-29 ENCOUNTER — APPOINTMENT (OUTPATIENT)
Dept: GENERAL RADIOLOGY | Facility: CLINIC | Age: 59
End: 2024-12-29
Payer: COMMERCIAL

## 2024-12-29 ENCOUNTER — APPOINTMENT (OUTPATIENT)
Dept: SPEECH THERAPY | Facility: CLINIC | Age: 59
End: 2024-12-29
Payer: COMMERCIAL

## 2024-12-29 LAB
ANION GAP SERPL CALCULATED.3IONS-SCNC: 9 MMOL/L (ref 7–15)
BACTERIA PRT CULT: ABNORMAL
BUN SERPL-MCNC: 18.4 MG/DL (ref 8–23)
CALCIUM SERPL-MCNC: 7.3 MG/DL (ref 8.8–10.4)
CHLORIDE SERPL-SCNC: 106 MMOL/L (ref 98–107)
CREAT SERPL-MCNC: 0.66 MG/DL (ref 0.67–1.17)
EGFRCR SERPLBLD CKD-EPI 2021: >90 ML/MIN/1.73M2
ERYTHROCYTE [DISTWIDTH] IN BLOOD BY AUTOMATED COUNT: 14.4 % (ref 10–15)
GLUCOSE BLDC GLUCOMTR-MCNC: 101 MG/DL (ref 70–99)
GLUCOSE BLDC GLUCOMTR-MCNC: 107 MG/DL (ref 70–99)
GLUCOSE BLDC GLUCOMTR-MCNC: 130 MG/DL (ref 70–99)
GLUCOSE BLDC GLUCOMTR-MCNC: 131 MG/DL (ref 70–99)
GLUCOSE BLDC GLUCOMTR-MCNC: 132 MG/DL (ref 70–99)
GLUCOSE BLDC GLUCOMTR-MCNC: 135 MG/DL (ref 70–99)
GLUCOSE BLDC GLUCOMTR-MCNC: 138 MG/DL (ref 70–99)
GLUCOSE BLDC GLUCOMTR-MCNC: 156 MG/DL (ref 70–99)
GLUCOSE BLDC GLUCOMTR-MCNC: 158 MG/DL (ref 70–99)
GLUCOSE BLDC GLUCOMTR-MCNC: 164 MG/DL (ref 70–99)
GLUCOSE BLDC GLUCOMTR-MCNC: 165 MG/DL (ref 70–99)
GLUCOSE BLDC GLUCOMTR-MCNC: 165 MG/DL (ref 70–99)
GLUCOSE BLDC GLUCOMTR-MCNC: 167 MG/DL (ref 70–99)
GLUCOSE BLDC GLUCOMTR-MCNC: 169 MG/DL (ref 70–99)
GLUCOSE BLDC GLUCOMTR-MCNC: 169 MG/DL (ref 70–99)
GLUCOSE BLDC GLUCOMTR-MCNC: 171 MG/DL (ref 70–99)
GLUCOSE BLDC GLUCOMTR-MCNC: 171 MG/DL (ref 70–99)
GLUCOSE BLDC GLUCOMTR-MCNC: 172 MG/DL (ref 70–99)
GLUCOSE BLDC GLUCOMTR-MCNC: 175 MG/DL (ref 70–99)
GLUCOSE BLDC GLUCOMTR-MCNC: 177 MG/DL (ref 70–99)
GLUCOSE BLDC GLUCOMTR-MCNC: 182 MG/DL (ref 70–99)
GLUCOSE BLDC GLUCOMTR-MCNC: 189 MG/DL (ref 70–99)
GLUCOSE SERPL-MCNC: 171 MG/DL (ref 70–99)
HCO3 SERPL-SCNC: 24 MMOL/L (ref 22–29)
HCT VFR BLD AUTO: 28.5 % (ref 40–53)
HGB BLD-MCNC: 9 G/DL (ref 13.3–17.7)
MAGNESIUM SERPL-MCNC: 1.6 MG/DL (ref 1.7–2.3)
MCH RBC QN AUTO: 27.6 PG (ref 26.5–33)
MCHC RBC AUTO-ENTMCNC: 31.6 G/DL (ref 31.5–36.5)
MCV RBC AUTO: 87 FL (ref 78–100)
PHOSPHATE SERPL-MCNC: 2.4 MG/DL (ref 2.5–4.5)
PLATELET # BLD AUTO: 186 10E3/UL (ref 150–450)
POTASSIUM SERPL-SCNC: 3.2 MMOL/L (ref 3.4–5.3)
POTASSIUM SERPL-SCNC: 3.2 MMOL/L (ref 3.4–5.3)
POTASSIUM SERPL-SCNC: 3.5 MMOL/L (ref 3.4–5.3)
RBC # BLD AUTO: 3.26 10E6/UL (ref 4.4–5.9)
SODIUM SERPL-SCNC: 139 MMOL/L (ref 135–145)
WBC # BLD AUTO: 7.9 10E3/UL (ref 4–11)

## 2024-12-29 PROCEDURE — 85018 HEMOGLOBIN: CPT | Performed by: STUDENT IN AN ORGANIZED HEALTH CARE EDUCATION/TRAINING PROGRAM

## 2024-12-29 PROCEDURE — 250N000009 HC RX 250: Performed by: STUDENT IN AN ORGANIZED HEALTH CARE EDUCATION/TRAINING PROGRAM

## 2024-12-29 PROCEDURE — 83735 ASSAY OF MAGNESIUM: CPT | Performed by: SURGERY

## 2024-12-29 PROCEDURE — 250N000009 HC RX 250: Performed by: NURSE PRACTITIONER

## 2024-12-29 PROCEDURE — 250N000011 HC RX IP 250 OP 636

## 2024-12-29 PROCEDURE — 80048 BASIC METABOLIC PNL TOTAL CA: CPT | Performed by: STUDENT IN AN ORGANIZED HEALTH CARE EDUCATION/TRAINING PROGRAM

## 2024-12-29 PROCEDURE — 84100 ASSAY OF PHOSPHORUS: CPT | Performed by: SURGERY

## 2024-12-29 PROCEDURE — 250N000013 HC RX MED GY IP 250 OP 250 PS 637: Performed by: SURGERY

## 2024-12-29 PROCEDURE — 250N000013 HC RX MED GY IP 250 OP 250 PS 637: Performed by: PHYSICIAN ASSISTANT

## 2024-12-29 PROCEDURE — 71045 X-RAY EXAM CHEST 1 VIEW: CPT | Mod: 26 | Performed by: RADIOLOGY

## 2024-12-29 PROCEDURE — 71045 X-RAY EXAM CHEST 1 VIEW: CPT

## 2024-12-29 PROCEDURE — 250N000011 HC RX IP 250 OP 636: Performed by: SURGERY

## 2024-12-29 PROCEDURE — 250N000009 HC RX 250: Performed by: SURGERY

## 2024-12-29 PROCEDURE — 258N000003 HC RX IP 258 OP 636

## 2024-12-29 PROCEDURE — 250N000011 HC RX IP 250 OP 636: Mod: JZ

## 2024-12-29 PROCEDURE — 84132 ASSAY OF SERUM POTASSIUM: CPT | Performed by: SURGERY

## 2024-12-29 PROCEDURE — 92526 ORAL FUNCTION THERAPY: CPT | Mod: GN

## 2024-12-29 PROCEDURE — 250N000011 HC RX IP 250 OP 636: Performed by: STUDENT IN AN ORGANIZED HEALTH CARE EDUCATION/TRAINING PROGRAM

## 2024-12-29 PROCEDURE — 258N000003 HC RX IP 258 OP 636: Performed by: SURGERY

## 2024-12-29 PROCEDURE — 92610 EVALUATE SWALLOWING FUNCTION: CPT | Mod: GN

## 2024-12-29 PROCEDURE — 250N000013 HC RX MED GY IP 250 OP 250 PS 637: Performed by: NURSE PRACTITIONER

## 2024-12-29 PROCEDURE — 120N000005 HC R&B MS OVERFLOW UMMC

## 2024-12-29 RX ORDER — MAGNESIUM SULFATE HEPTAHYDRATE 40 MG/ML
2 INJECTION, SOLUTION INTRAVENOUS ONCE
Status: COMPLETED | OUTPATIENT
Start: 2024-12-29 | End: 2024-12-29

## 2024-12-29 RX ORDER — FAMOTIDINE 40 MG/1
40 TABLET, FILM COATED ORAL DAILY PRN
COMMUNITY
End: 2025-01-22

## 2024-12-29 RX ORDER — POTASSIUM CHLORIDE 29.8 MG/ML
20 INJECTION INTRAVENOUS
Status: COMPLETED | OUTPATIENT
Start: 2024-12-29 | End: 2024-12-29

## 2024-12-29 RX ORDER — POTASSIUM CHLORIDE 750 MG/1
20 TABLET, EXTENDED RELEASE ORAL ONCE
Status: COMPLETED | OUTPATIENT
Start: 2024-12-29 | End: 2024-12-29

## 2024-12-29 RX ORDER — POTASSIUM PHOS IN 0.9 % NACL 15MMOL/250
15 PLASTIC BAG, INJECTION (ML) INTRAVENOUS ONCE
Status: COMPLETED | OUTPATIENT
Start: 2024-12-29 | End: 2024-12-29

## 2024-12-29 RX ADMIN — Medication 5 MG: at 18:23

## 2024-12-29 RX ADMIN — INSULIN HUMAN 3 UNITS/HR: 1 INJECTION, SOLUTION INTRAVENOUS at 04:30

## 2024-12-29 RX ADMIN — BICTEGRAVIR SODIUM, EMTRICITABINE, AND TENOFOVIR ALAFENAMIDE FUMARATE 1 TABLET: 50; 200; 25 TABLET ORAL at 14:26

## 2024-12-29 RX ADMIN — ACETAMINOPHEN 975 MG: 325 TABLET, FILM COATED ORAL at 21:52

## 2024-12-29 RX ADMIN — CEFEPIME HYDROCHLORIDE 2 G: 2 INJECTION, POWDER, FOR SOLUTION INTRAVENOUS at 02:14

## 2024-12-29 RX ADMIN — PANTOPRAZOLE SODIUM 40 MG: 40 INJECTION, POWDER, FOR SOLUTION INTRAVENOUS at 08:58

## 2024-12-29 RX ADMIN — METRONIDAZOLE 500 MG: 500 INJECTION, SOLUTION INTRAVENOUS at 04:28

## 2024-12-29 RX ADMIN — HYDROMORPHONE HYDROCHLORIDE 0.4 MG: 0.2 INJECTION, SOLUTION INTRAMUSCULAR; INTRAVENOUS; SUBCUTANEOUS at 11:51

## 2024-12-29 RX ADMIN — POTASSIUM CHLORIDE 20 MEQ: 750 TABLET, EXTENDED RELEASE ORAL at 20:06

## 2024-12-29 RX ADMIN — CEFEPIME HYDROCHLORIDE 2 G: 2 INJECTION, POWDER, FOR SOLUTION INTRAVENOUS at 09:25

## 2024-12-29 RX ADMIN — ACETAMINOPHEN 975 MG: 325 TABLET, FILM COATED ORAL at 06:14

## 2024-12-29 RX ADMIN — CEFEPIME HYDROCHLORIDE 2 G: 2 INJECTION, POWDER, FOR SOLUTION INTRAVENOUS at 18:23

## 2024-12-29 RX ADMIN — METRONIDAZOLE 500 MG: 500 INJECTION, SOLUTION INTRAVENOUS at 11:05

## 2024-12-29 RX ADMIN — OXYCODONE HYDROCHLORIDE 5 MG: 5 TABLET ORAL at 18:23

## 2024-12-29 RX ADMIN — QUETIAPINE FUMARATE 25 MG: 25 TABLET ORAL at 06:14

## 2024-12-29 RX ADMIN — OXYCODONE HYDROCHLORIDE 5 MG: 5 TABLET ORAL at 21:52

## 2024-12-29 RX ADMIN — POTASSIUM CHLORIDE 20 MEQ: 29.8 INJECTION INTRAVENOUS at 11:52

## 2024-12-29 RX ADMIN — QUETIAPINE FUMARATE 25 MG: 25 TABLET ORAL at 14:26

## 2024-12-29 RX ADMIN — METRONIDAZOLE 500 MG: 500 INJECTION, SOLUTION INTRAVENOUS at 20:07

## 2024-12-29 RX ADMIN — QUETIAPINE FUMARATE 50 MG: 50 TABLET ORAL at 21:53

## 2024-12-29 RX ADMIN — POTASSIUM PHOSPHATE, MONOBASIC POTASSIUM PHOSPHATE, DIBASIC: 224; 236 INJECTION, SOLUTION, CONCENTRATE INTRAVENOUS at 20:12

## 2024-12-29 RX ADMIN — MAGNESIUM SULFATE HEPTAHYDRATE 2 G: 2 INJECTION, SOLUTION INTRAVENOUS at 14:25

## 2024-12-29 RX ADMIN — MICAFUNGIN 100 MG: 20 INJECTION, POWDER, LYOPHILIZED, FOR SOLUTION INTRAVENOUS at 16:16

## 2024-12-29 RX ADMIN — HYDROMORPHONE HYDROCHLORIDE 0.4 MG: 0.2 INJECTION, SOLUTION INTRAMUSCULAR; INTRAVENOUS; SUBCUTANEOUS at 16:21

## 2024-12-29 RX ADMIN — POTASSIUM CHLORIDE 20 MEQ: 29.8 INJECTION INTRAVENOUS at 10:55

## 2024-12-29 RX ADMIN — POTASSIUM PHOSPHATE, MONOBASIC POTASSIUM PHOSPHATE, DIBASIC 15 MMOL: 236; 224 INJECTION, SOLUTION INTRAVENOUS at 12:19

## 2024-12-29 RX ADMIN — ENOXAPARIN SODIUM 40 MG: 40 INJECTION SUBCUTANEOUS at 20:07

## 2024-12-29 RX ADMIN — OXYCODONE HYDROCHLORIDE 5 MG: 5 TABLET ORAL at 11:05

## 2024-12-29 ASSESSMENT — ACTIVITIES OF DAILY LIVING (ADL)
ADLS_ACUITY_SCORE: 48
ADLS_ACUITY_SCORE: 47
ADLS_ACUITY_SCORE: 48
ADLS_ACUITY_SCORE: 47
ADLS_ACUITY_SCORE: 48
ADLS_ACUITY_SCORE: 47
ADLS_ACUITY_SCORE: 48
ADLS_ACUITY_SCORE: 48
ADLS_ACUITY_SCORE: 47
ADLS_ACUITY_SCORE: 48
ADLS_ACUITY_SCORE: 48

## 2024-12-29 NOTE — PLAN OF CARE
Goal Outcome Evaluation:      Plan of Care Reviewed With: patient    Overall Patient Progress: no change    Outcome Evaluation: Intermittent confusion but able to understand current status and answer general orientation questions correctly    Neuro: A & O x4 w/ intermittent confusion. 5+ equal strength in all extremities. Perrl.  at bedside. Refused activity throughout the day.  Pain: 7-9 abdominal pain throughout the day. Dilaudid given, Oxycodone given once.   Cardiac: SR, HR 80-90s w/ no noted ectopy. -150/80s. Afebrile.   Resp: 2L NC. Lungs diminished. Refused IS.   GI: Clears w/ pills only until speech eval. BM x2 today.  : Voiding adequatetly  LADs: PIV, PICC  Gtts:  -insulin gtt 2-7u/hr (alg 4)  Integ:  -Midline incision - daily - W-D + ABD + binder, small amt of serous drainage  Labs:  -Lytes rechecked and replaced per protocol.

## 2024-12-29 NOTE — PROGRESS NOTES
"   12/29/24 3384   Appointment Info   Signing Clinician's Name / Credentials (SLP) Alice Renner MA CCC-SLP   General Information   Onset of Illness/Injury or Date of Surgery 12/20/24   Referring Physician Christiano Obrien MD   Patient/Family Therapy Goal Statement (SLP) Pt wants to drink   Pertinent History of Current Problem   Pt is a 59yoM with h/o T2DM, HIV on ART, and recent admission from 12/17-12/19 for SBO managed successfully with bowel rest/GGC who presented to ED again on 12/20 for recurrent SBO s/p ex lap/SBR on 12/20. Now s/p ex-lap/SBR/re-anastomosis for anastomotic leak on 12/24. Extubated 12/26 AM. Per RN, pt choked on liquids when lying flat. Per surgery notes, pt OK to ADAT. Clinical swallow eval completed per MD order.     General Observations Alert, agreeable   Type of Evaluation   Type of Evaluation Swallow Evaluation   Oral Motor   Oral Musculature generally intact   Structural Abnormalities none present   Mucosal Quality good   Dentition (Oral Motor)   Dentition (Oral Motor) adequate dentition   Facial Symmetry (Oral Motor)   Facial Symmetry (Oral Motor) WNL   Lip Function (Oral Motor)   Lip Range of Motion (Oral Motor) WNL   Tongue Function (Oral Motor)   Tongue ROM (Oral Motor) WNL   Jaw Function (Oral Motor)   Jaw Function (Oral Motor) WNL   Cough/Swallow/Gag Reflex (Oral Motor)   Comment, Cough/Swallow/Gag Reflex (Oral Motor) WNL   Vocal Quality/Secretion Management (Oral Motor)   Vocal Quality (Oral Motor) WNL   General Swallowing Observations   Past History of Dysphagia Pt reports he has \"had this problem\" since he was 5, when he choked on liquids and passed out. He reports this happens off and on over the years. He reports he last had pneumonia 3 years ago. Pt was seen by SLP in 2015 - regular diet/thin liquids recommended.   Respiratory Support nasal cannula   Current Diet/Method of Nutritional Intake (General Swallowing Observations, NIS) NPO   Swallowing Evaluation " Clinical swallow evaluation   Clinical Swallow Evaluation   Feeding Assistance set up only required   Clinical Swallow Evaluation Textures Trialed thin liquids;pureed;solid foods   Clinical Swallow Eval: Thin Liquid Texture Trial   Mode of Presentation, Thin Liquids straw;self-fed   Volume of Liquid or Food Presented 4oz thin water   Oral Phase of Swallow WFL   Pharyngeal Phase of Swallow coughing/choking  (x1)   Diagnostic Statement coughing noted x1 - pt reports his swallow function feels c/w baseline   Clinical Swallow Evaluation: Puree Solid Texture Trial   Mode of Presentation, Puree spoon   Volume of Puree Presented 2oz applesauce   Oral Phase, Puree WFL   Pharyngeal Phase, Puree intact   Diagnostic Statement No overt s/sx of aspiration   Clinical Swallow Evaluation: Solid Food Texture Trial   Mode of Presentation self-fed   Volume Presented 1 esau cracker   Oral Phase WFL   Pharyngeal Phase intact   Diagnostic Statement No overt s/sx of aspiration   Swallowing Recommendations   Diet Consistency Recommendations regular diet;thin liquids (level 0)  (defer to RN and MD for diet advancement from a medical standpoint)   Supervision Level for Intake distant supervision needed   Mode of Delivery Recommendations bolus size, small;slow rate of intake   Monitoring/Assistance Required (Eating/Swallowing) stop eating activities when fatigue is present;monitor for cough or change in vocal quality with intake   Recommended Feeding/Eating Techniques (Swallow Eval) maintain upright sitting position for eating   Medication Administration Recommendations, Swallowing (SLP) as tolerated   Instrumental Assessment Recommendations reassess via non-instrumental clinical swallow evaluation;instrumental evaluation not recommended at this time   General Therapy Interventions   Planned Therapy Interventions Dysphagia Treatment   Dysphagia treatment Instruction of safe swallow strategies;Compensatory strategies for swallowing    Clinical Impression   Criteria for Skilled Therapeutic Interventions Met (SLP Eval) Yes, treatment indicated   SLP Diagnosis hx of dysphagia per pt report   Risks & Benefits of therapy have been explained evaluation/treatment results reviewed;care plan/treatment goals reviewed;risks/benefits reviewed;current/potential barriers reviewed;participants voiced agreement with care plan;participants included;patient   Clinical Impression Comments   Clinical swallow eval completed per MD order. Pt reports hx of dysphagia to liquids. Oral mech exam unremarkable. Pt positioned fully upright for eval. Pt assessed with thin liquids via straw, applesauce, and cracker. Cough noted x1 with thin liquids, otherwise good tolerance of PO. From SLP perspective, pt OK for regular diet/thin liquids - defer to MD/RN for diet advancement from a medical perspective. Ensure pt is upright and alert for all PO. SLP will follow for short course to ensure diet tolerance.     SLP Total Evaluation Time   Eval: oral/pharyngeal swallow function, clinical swallow Minutes (74599) 10   SLP Discharge Recommendation home with assist   SLP Rationale for DC Rec anticipate pt will meet all ST goals prior to d/c   SLP Time and Intention   Total Session Time (sum of timed and untimed services) 15

## 2024-12-29 NOTE — PROGRESS NOTES
ICU End of Shift Summary. See flowsheets for vital signs and detailed assessment.    Changes this shift: Pt remains AOx4. Able to answer all questions approprietly. Reporting some pain overnight. See MAR for meds given. Sitter remains at bedside. SR overnight with rates 70-80s. Remains on 2L NC. Clear liquid diet. 2 BM overnight.     Plan:  Transfer to floor when able

## 2024-12-29 NOTE — PROGRESS NOTES
Brief EGS note    SLP cleared pt for Regular Diet-- will need to sit up right for all PO intake. Notified by RN that pt requiring 4U on insulin gtt (has been in 1-6U range throughout the day). We will begin carb correction insulin (1U per 10g). Cont TPN for now given minimal PO intake in the last week.     Neris Parikh DO   General Surgery PGY-2

## 2024-12-29 NOTE — PROGRESS NOTES
"Emergency General Surgery Progress Note     S: patient reports he slept ok, pain better controlled. Did not eat yesterday, but tolerated water without N/V. Not interested in eating, does not feel hungry. Passing gas and having BM.     O:  /68   Pulse 95   Temp 98.8  F (37.1  C) (Oral)   Resp 22   Ht 1.626 m (5' 4\")   Wt 83.3 kg (183 lb 10.3 oz)   SpO2 99%   BMI 31.52 kg/m    General:Alert, answering questions appropriately, mental status similar to baseline   CV: RRR  Resp: No increased work of breathing  Abd: soft, NT mildly tender to palpation, no peritoneal signs   - midline incision open with fascia intact, increased s/s drainage on kerlix packing.    Ext wwp, restraints in place     Recent Labs   Lab 12/28/24  0416 12/27/24  0358 12/26/24  2354 12/26/24  0810   WBC 7.0 6.9  --  5.8   HGB 10.1* 9.6*  --  9.1*    186 183 157       Recent Labs   Lab 12/29/24  0702 12/29/24  0610 12/29/24  0427 12/28/24  1315 12/28/24  1225 12/28/24  0502 12/28/24  0416 12/27/24  0403 12/27/24  0358 12/26/24  0558 12/26/24  0437   NA  --   --   --   --   --   --  147*  --  148*  --  149*   POTASSIUM  --   --   --   --  3.4  --  2.9*  --  3.8  --  3.5   CHLORIDE  --   --   --   --   --   --  115*  --  120*  --  122*   CO2  --   --   --   --   --   --  24  --  20*  --  19*   BUN  --   --   --   --   --   --  27.9*  --  45.7*  --  42.8*   CR  --   --   --   --   --   --  0.71  --  0.83  --  0.95   * 182* 165*   < >  --    < > 150*   < > 191*   < > 156*   LINDA  --   --   --   --   --   --  8.3*  --  8.1*  --  8.1*   MAG  --   --   --   --   --   --  1.9  --  2.1  --  2.3   PHOS  --   --   --   --   --   --  2.6  --  2.9  --  2.1*    < > = values in this interval not displayed.       Recent Labs   Lab 12/25/24  2041 12/24/24  2350 12/24/24  2312   LACT 1.0 1.4 1.4       Recent Labs   Lab 12/26/24  0437   INR 1.16*       Recent Labs   Lab 12/26/24 0437   AST 59*   ALT 11   ALKPHOS 65   BILITOTAL 0.5   ALBUMIN " 2.0*   INR 1.16*        A/P: 59yoM with h/o T2DM, HIV on ART, and recent admission from 12/17-12/19 for SBO managed successfully with bowel rest/GGC who presented to ED again on 12/20 for recurrent SBO s/p ex lap/SBR on 12/20. Now s/p ex-lap/SBR/re-anastomosis for anastomotic leak on 12/24. Extubated 12/26 AM.     POD#5- EGS changed dressing this AM, slightly more drainage on bandage compared to previous days. Having antegrade function. Per nurse, was kept on CLD yesterday after possible aspiration event    -SLP consult for swallow assessment  - NPO until evaluated by SLP  - Cont TPN  - Replete electrolytes   - Appreciate ID recs   - Strict I/O  - RN to change midline dressing daily & PRN with moistened kerlix and gauze/ABD (orders placed)  - ok for transfer to floor    Seen with chief resident who will d/w staff     Haley Sam  PGY-1  General Surgery

## 2024-12-29 NOTE — PHARMACY-ADMISSION MEDICATION HISTORY
Pharmacy Intern Admission Medication History    Admission medication history is complete. The information provided in this note is only as accurate as the sources available at the time of the update.    Information Source(s): Patient and CareEverywhere/SureScripts via in-person    Pertinent Information:   -patient was a good historian  -insulin glargine: patient reported that they take 27 units every morning at home    Changes made to PTA medication list:  Added:   Famotidine 40 mg tablets: patient reports they take as needed  Deleted: None  Changed: None    Allergies reviewed with patient and updates made in EHR: yes    Medication History Completed By: Lillian Davies 12/29/2024 3:04 PM    PTA Med List   Medication Sig Last Dose/Taking    bictegravir-emtricitabine-tenofovir (BIKTARVY) -25 MG per tablet Take 1 tablet by mouth daily. Past Month    famotidine (PEPCID) 40 MG tablet Take 40 mg by mouth daily as needed for heartburn. Past Month    insulin aspart (NOVOLOG PEN) 100 UNIT/ML pen Inject 10 Units subcutaneously 3 times daily (with meals). Take only when you have access to a meal. Past Month    insulin glargine (LANTUS PEN) 100 UNIT/ML pen Inject 25 Units subcutaneously every morning. REDUCE TO 15 UNITS IF YOU THINK YOU WILL NOT HAVE FOOD THAT DAY. (Patient taking differently: Inject 27 Units subcutaneously every morning. REDUCE TO 15 UNITS IF YOU THINK YOU WILL NOT HAVE FOOD THAT DAY.) Past Month

## 2024-12-30 ENCOUNTER — APPOINTMENT (OUTPATIENT)
Dept: CT IMAGING | Facility: CLINIC | Age: 59
End: 2024-12-30
Payer: COMMERCIAL

## 2024-12-30 ENCOUNTER — APPOINTMENT (OUTPATIENT)
Dept: PHYSICAL THERAPY | Facility: CLINIC | Age: 59
End: 2024-12-30
Payer: COMMERCIAL

## 2024-12-30 ENCOUNTER — APPOINTMENT (OUTPATIENT)
Dept: GENERAL RADIOLOGY | Facility: CLINIC | Age: 59
End: 2024-12-30
Payer: COMMERCIAL

## 2024-12-30 LAB
ALBUMIN SERPL BCG-MCNC: 1.8 G/DL (ref 3.5–5.2)
ALP SERPL-CCNC: 117 U/L (ref 40–150)
ALT SERPL W P-5'-P-CCNC: 9 U/L (ref 0–70)
ANION GAP SERPL CALCULATED.3IONS-SCNC: 8 MMOL/L (ref 7–15)
AST SERPL W P-5'-P-CCNC: 57 U/L (ref 0–45)
ATRIAL RATE - MUSE: 81 BPM
BACTERIA BLD CULT: NO GROWTH
BACTERIA BLD CULT: NO GROWTH
BILIRUB SERPL-MCNC: 0.7 MG/DL
BUN SERPL-MCNC: 17.3 MG/DL (ref 8–23)
CALCIUM SERPL-MCNC: 7.2 MG/DL (ref 8.8–10.4)
CHLORIDE SERPL-SCNC: 105 MMOL/L (ref 98–107)
CREAT SERPL-MCNC: 0.68 MG/DL (ref 0.67–1.17)
DIASTOLIC BLOOD PRESSURE - MUSE: NORMAL MMHG
EGFRCR SERPLBLD CKD-EPI 2021: >90 ML/MIN/1.73M2
ERYTHROCYTE [DISTWIDTH] IN BLOOD BY AUTOMATED COUNT: 14.2 % (ref 10–15)
GLUCOSE BLDC GLUCOMTR-MCNC: 102 MG/DL (ref 70–99)
GLUCOSE BLDC GLUCOMTR-MCNC: 102 MG/DL (ref 70–99)
GLUCOSE BLDC GLUCOMTR-MCNC: 111 MG/DL (ref 70–99)
GLUCOSE BLDC GLUCOMTR-MCNC: 121 MG/DL (ref 70–99)
GLUCOSE BLDC GLUCOMTR-MCNC: 121 MG/DL (ref 70–99)
GLUCOSE BLDC GLUCOMTR-MCNC: 124 MG/DL (ref 70–99)
GLUCOSE BLDC GLUCOMTR-MCNC: 124 MG/DL (ref 70–99)
GLUCOSE BLDC GLUCOMTR-MCNC: 126 MG/DL (ref 70–99)
GLUCOSE BLDC GLUCOMTR-MCNC: 126 MG/DL (ref 70–99)
GLUCOSE BLDC GLUCOMTR-MCNC: 128 MG/DL (ref 70–99)
GLUCOSE BLDC GLUCOMTR-MCNC: 129 MG/DL (ref 70–99)
GLUCOSE BLDC GLUCOMTR-MCNC: 132 MG/DL (ref 70–99)
GLUCOSE BLDC GLUCOMTR-MCNC: 137 MG/DL (ref 70–99)
GLUCOSE BLDC GLUCOMTR-MCNC: 142 MG/DL (ref 70–99)
GLUCOSE BLDC GLUCOMTR-MCNC: 144 MG/DL (ref 70–99)
GLUCOSE BLDC GLUCOMTR-MCNC: 147 MG/DL (ref 70–99)
GLUCOSE BLDC GLUCOMTR-MCNC: 149 MG/DL (ref 70–99)
GLUCOSE BLDC GLUCOMTR-MCNC: 95 MG/DL (ref 70–99)
GLUCOSE BLDC GLUCOMTR-MCNC: 98 MG/DL (ref 70–99)
GLUCOSE SERPL-MCNC: 138 MG/DL (ref 70–99)
HCO3 SERPL-SCNC: 23 MMOL/L (ref 22–29)
HCT VFR BLD AUTO: 27.4 % (ref 40–53)
HGB BLD-MCNC: 8.7 G/DL (ref 13.3–17.7)
INR PPP: 1.3 (ref 0.85–1.15)
INTERPRETATION ECG - MUSE: NORMAL
MAGNESIUM SERPL-MCNC: 1.8 MG/DL (ref 1.7–2.3)
MCH RBC QN AUTO: 27.5 PG (ref 26.5–33)
MCHC RBC AUTO-ENTMCNC: 31.8 G/DL (ref 31.5–36.5)
MCV RBC AUTO: 87 FL (ref 78–100)
MISCELLANEOUS TEST 1 (ARUP): NORMAL
P AXIS - MUSE: 24 DEGREES
PHOSPHATE SERPL-MCNC: 3.1 MG/DL (ref 2.5–4.5)
PLATELET # BLD AUTO: 193 10E3/UL (ref 150–450)
POTASSIUM SERPL-SCNC: 3.9 MMOL/L (ref 3.4–5.3)
PR INTERVAL - MUSE: 130 MS
PROT SERPL-MCNC: 4.8 G/DL (ref 6.4–8.3)
QRS DURATION - MUSE: 100 MS
QT - MUSE: 370 MS
QTC - MUSE: 429 MS
R AXIS - MUSE: -12 DEGREES
RBC # BLD AUTO: 3.16 10E6/UL (ref 4.4–5.9)
SODIUM SERPL-SCNC: 136 MMOL/L (ref 135–145)
SYSTOLIC BLOOD PRESSURE - MUSE: NORMAL MMHG
T AXIS - MUSE: -7 DEGREES
TRIGL SERPL-MCNC: 76 MG/DL
VENTRICULAR RATE- MUSE: 81 BPM
WBC # BLD AUTO: 9.9 10E3/UL (ref 4–11)

## 2024-12-30 PROCEDURE — 71045 X-RAY EXAM CHEST 1 VIEW: CPT

## 2024-12-30 PROCEDURE — 84460 ALANINE AMINO (ALT) (SGPT): CPT | Performed by: SURGERY

## 2024-12-30 PROCEDURE — 258N000003 HC RX IP 258 OP 636

## 2024-12-30 PROCEDURE — 85027 COMPLETE CBC AUTOMATED: CPT | Performed by: SURGERY

## 2024-12-30 PROCEDURE — 74177 CT ABD & PELVIS W/CONTRAST: CPT

## 2024-12-30 PROCEDURE — 250N000009 HC RX 250: Performed by: STUDENT IN AN ORGANIZED HEALTH CARE EDUCATION/TRAINING PROGRAM

## 2024-12-30 PROCEDURE — 74177 CT ABD & PELVIS W/CONTRAST: CPT | Mod: 26 | Performed by: RADIOLOGY

## 2024-12-30 PROCEDURE — 99233 SBSQ HOSP IP/OBS HIGH 50: CPT | Performed by: STUDENT IN AN ORGANIZED HEALTH CARE EDUCATION/TRAINING PROGRAM

## 2024-12-30 PROCEDURE — 250N000011 HC RX IP 250 OP 636

## 2024-12-30 PROCEDURE — 250N000011 HC RX IP 250 OP 636: Performed by: STUDENT IN AN ORGANIZED HEALTH CARE EDUCATION/TRAINING PROGRAM

## 2024-12-30 PROCEDURE — 250N000009 HC RX 250: Performed by: SURGERY

## 2024-12-30 PROCEDURE — 84155 ASSAY OF PROTEIN SERUM: CPT | Performed by: SURGERY

## 2024-12-30 PROCEDURE — 84100 ASSAY OF PHOSPHORUS: CPT | Performed by: SURGERY

## 2024-12-30 PROCEDURE — 71045 X-RAY EXAM CHEST 1 VIEW: CPT | Mod: 26 | Performed by: RADIOLOGY

## 2024-12-30 PROCEDURE — 84478 ASSAY OF TRIGLYCERIDES: CPT | Performed by: SURGERY

## 2024-12-30 PROCEDURE — 250N000013 HC RX MED GY IP 250 OP 250 PS 637: Performed by: PHYSICIAN ASSISTANT

## 2024-12-30 PROCEDURE — 120N000002 HC R&B MED SURG/OB UMMC

## 2024-12-30 PROCEDURE — 250N000009 HC RX 250: Performed by: NURSE PRACTITIONER

## 2024-12-30 PROCEDURE — 250N000013 HC RX MED GY IP 250 OP 250 PS 637: Performed by: SURGERY

## 2024-12-30 PROCEDURE — B4185 PARENTERAL SOL 10 GM LIPIDS: HCPCS | Performed by: SURGERY

## 2024-12-30 PROCEDURE — 97530 THERAPEUTIC ACTIVITIES: CPT | Mod: GP

## 2024-12-30 PROCEDURE — 83735 ASSAY OF MAGNESIUM: CPT | Performed by: SURGERY

## 2024-12-30 PROCEDURE — 250N000013 HC RX MED GY IP 250 OP 250 PS 637: Performed by: NURSE PRACTITIONER

## 2024-12-30 PROCEDURE — 85610 PROTHROMBIN TIME: CPT | Performed by: SURGERY

## 2024-12-30 RX ORDER — MAGNESIUM OXIDE 400 MG/1
400 TABLET ORAL EVERY 4 HOURS
Status: COMPLETED | OUTPATIENT
Start: 2024-12-30 | End: 2024-12-30

## 2024-12-30 RX ORDER — IOPAMIDOL 755 MG/ML
105 INJECTION, SOLUTION INTRAVASCULAR ONCE
Status: COMPLETED | OUTPATIENT
Start: 2024-12-30 | End: 2024-12-30

## 2024-12-30 RX ADMIN — HYDROMORPHONE HYDROCHLORIDE 0.4 MG: 0.2 INJECTION, SOLUTION INTRAMUSCULAR; INTRAVENOUS; SUBCUTANEOUS at 01:10

## 2024-12-30 RX ADMIN — BICTEGRAVIR SODIUM, EMTRICITABINE, AND TENOFOVIR ALAFENAMIDE FUMARATE 1 TABLET: 50; 200; 25 TABLET ORAL at 13:33

## 2024-12-30 RX ADMIN — QUETIAPINE FUMARATE 25 MG: 25 TABLET ORAL at 06:10

## 2024-12-30 RX ADMIN — CEFEPIME HYDROCHLORIDE 2 G: 2 INJECTION, POWDER, FOR SOLUTION INTRAVENOUS at 09:28

## 2024-12-30 RX ADMIN — METRONIDAZOLE 500 MG: 500 INJECTION, SOLUTION INTRAVENOUS at 11:52

## 2024-12-30 RX ADMIN — ACETAMINOPHEN 975 MG: 325 TABLET, FILM COATED ORAL at 21:00

## 2024-12-30 RX ADMIN — MICAFUNGIN 100 MG: 20 INJECTION, POWDER, LYOPHILIZED, FOR SOLUTION INTRAVENOUS at 16:24

## 2024-12-30 RX ADMIN — HYDROMORPHONE HYDROCHLORIDE 0.4 MG: 0.2 INJECTION, SOLUTION INTRAMUSCULAR; INTRAVENOUS; SUBCUTANEOUS at 16:51

## 2024-12-30 RX ADMIN — PANTOPRAZOLE SODIUM 40 MG: 40 INJECTION, POWDER, FOR SOLUTION INTRAVENOUS at 08:06

## 2024-12-30 RX ADMIN — POTASSIUM PHOSPHATE, MONOBASIC POTASSIUM PHOSPHATE, DIBASIC: 224; 236 INJECTION, SOLUTION, CONCENTRATE INTRAVENOUS at 20:46

## 2024-12-30 RX ADMIN — OXYCODONE HYDROCHLORIDE 5 MG: 5 TABLET ORAL at 06:10

## 2024-12-30 RX ADMIN — INSULIN HUMAN 6 UNITS/HR: 1 INJECTION, SOLUTION INTRAVENOUS at 04:11

## 2024-12-30 RX ADMIN — INSULIN HUMAN 5 UNITS/HR: 1 INJECTION, SOLUTION INTRAVENOUS at 17:37

## 2024-12-30 RX ADMIN — CEFEPIME HYDROCHLORIDE 2 G: 2 INJECTION, POWDER, FOR SOLUTION INTRAVENOUS at 01:13

## 2024-12-30 RX ADMIN — OXYCODONE HYDROCHLORIDE 5 MG: 5 TABLET ORAL at 13:33

## 2024-12-30 RX ADMIN — OXYCODONE HYDROCHLORIDE 5 MG: 5 TABLET ORAL at 17:39

## 2024-12-30 RX ADMIN — METRONIDAZOLE 500 MG: 500 INJECTION, SOLUTION INTRAVENOUS at 03:58

## 2024-12-30 RX ADMIN — CEFEPIME HYDROCHLORIDE 2 G: 2 INJECTION, POWDER, FOR SOLUTION INTRAVENOUS at 17:38

## 2024-12-30 RX ADMIN — IOPAMIDOL 105 ML: 755 INJECTION, SOLUTION INTRAVENOUS at 18:00

## 2024-12-30 RX ADMIN — ENOXAPARIN SODIUM 40 MG: 40 INJECTION SUBCUTANEOUS at 20:59

## 2024-12-30 RX ADMIN — ACETAMINOPHEN 975 MG: 325 TABLET, FILM COATED ORAL at 13:33

## 2024-12-30 RX ADMIN — QUETIAPINE FUMARATE 50 MG: 50 TABLET ORAL at 21:00

## 2024-12-30 RX ADMIN — OXYCODONE HYDROCHLORIDE 5 MG: 5 TABLET ORAL at 21:54

## 2024-12-30 RX ADMIN — ACETAMINOPHEN 975 MG: 325 TABLET, FILM COATED ORAL at 06:10

## 2024-12-30 RX ADMIN — HYDROMORPHONE HYDROCHLORIDE 0.4 MG: 0.2 INJECTION, SOLUTION INTRAMUSCULAR; INTRAVENOUS; SUBCUTANEOUS at 10:38

## 2024-12-30 RX ADMIN — METRONIDAZOLE 500 MG: 500 INJECTION, SOLUTION INTRAVENOUS at 20:56

## 2024-12-30 RX ADMIN — OLIVE OIL AND SOYBEAN OIL 250 ML: 16; 4 INJECTION, EMULSION INTRAVENOUS at 20:47

## 2024-12-30 RX ADMIN — Medication 5 MG: at 17:39

## 2024-12-30 RX ADMIN — MAGNESIUM OXIDE TAB 400 MG (241.3 MG ELEMENTAL MG) 400 MG: 400 (241.3 MG) TAB at 09:28

## 2024-12-30 RX ADMIN — MAGNESIUM OXIDE TAB 400 MG (241.3 MG ELEMENTAL MG) 400 MG: 400 (241.3 MG) TAB at 06:10

## 2024-12-30 ASSESSMENT — ACTIVITIES OF DAILY LIVING (ADL)
ADLS_ACUITY_SCORE: 48
ADLS_ACUITY_SCORE: 48
ADLS_ACUITY_SCORE: 43
ADLS_ACUITY_SCORE: 48

## 2024-12-30 NOTE — PROGRESS NOTES
"Emergency General Surgery Progress Note     Subjective  Pain well controlled. He did not eat yesterday and still does not have an appetite. Passing flatus and having bowel movement.    Objective  /67 (BP Location: Left arm, Cuff Size: Adult Regular)   Pulse 96   Temp 97.8  F (36.6  C) (Axillary)   Resp 15   Ht 1.626 m (5' 4\")   Wt 78.1 kg (172 lb 2.9 oz)   SpO2 98%   BMI 29.55 kg/m      General: Alert, mumbling through questioning   CV: RRR  Resp: No increased work of breathing  Abd: soft, tender to palpation, mildly distended,  no peritoneal signs, midline incision open with fascia intact, increased s/s drainage on kerlix packing.    Ext: wwp    Recent Labs   Lab 12/30/24  0405 12/29/24  0900 12/28/24  0416   WBC 9.9 7.9 7.0   HGB 8.7* 9.0* 10.1*    186 193       Recent Labs   Lab 12/30/24  0804 12/30/24  0623 12/30/24  0406 12/30/24  0405 12/29/24  1915 12/29/24  1814 12/29/24  1005 12/29/24  0900 12/28/24  0502 12/28/24  0416   NA  --   --   --  136  --   --   --  139  --  147*   POTASSIUM  --   --   --  3.9  --  3.5  --  3.2*  3.2*   < > 2.9*   CHLORIDE  --   --   --  105  --   --   --  106  --  115*   CO2  --   --   --  23  --   --   --  24  --  24   BUN  --   --   --  17.3  --   --   --  18.4  --  27.9*   CR  --   --   --  0.68  --   --   --  0.66*  --  0.71   * 147* 129* 138*   < >  --    < > 171*   < > 150*   LINDA  --   --   --  7.2*  --   --   --  7.3*  --  8.3*   MAG  --   --   --  1.8  --   --   --  1.6*  --  1.9   PHOS  --   --   --  3.1  --   --   --  2.4*  --  2.6    < > = values in this interval not displayed.       Recent Labs   Lab 12/25/24  2041 12/24/24  2350 12/24/24  2312   LACT 1.0 1.4 1.4       Recent Labs   Lab 12/30/24  0405 12/26/24  0437   INR 1.30* 1.16*       Recent Labs   Lab 12/30/24  0405 12/26/24  0437   AST 57* 59*   ALT 9 11   ALKPHOS 117 65   BILITOTAL 0.7 0.5   ALBUMIN 1.8* 2.0*   INR 1.30* 1.16*      Imaging:      A/P: 59yoM with h/o T2DM, HIV on ART, " and recent admission from 12/17-12/19 for SBO managed successfully with bowel rest/GGC who presented to ED again on 12/20 for recurrent SBO s/p ex lap/SBR on 12/20. Now s/p ex-lap/SBR/re-anastomosis for anastomotic leak on 12/24. Extubated 12/26 AM.     POD#6- Dressing changed at bedside. Advanced to regular diet. Goals today, WOC consult for wound vac placement.     - Regular diet   - Cont TPN  - Replete electrolytes   - Appreciate ID recs   - Strict I/O  - RN to change midline dressing daily & PRN with moistened kerlix and gauze/ABD (orders placed)  - WOC consult pending  - ok for transfer to floor    Patient seen, examined and discussed with chief resident, who will discuss with staff surgeon.     Av Cormier MD  Urology, PGY-1 on EGS Service

## 2024-12-30 NOTE — PLAN OF CARE
Goal Outcome Evaluation:         VSS on RA, up with A2, WOCN nurse placed wound vac this afternoon to abd wound, after patient transferred from  to this unit, patient c/o abd pain using oxycodone and iv dilaudid with relief, down for abd CT this eduarda, has 1:1 sitter that will be discontinued at 1900 due to using call light appropriately and is alert and orientated, not trying to pull at any lines or drains, will have bed alarm on patient when sitter discontinued.

## 2024-12-30 NOTE — PLAN OF CARE
Major Shift Events : reports pain approp ,     Neuro: A/o x 3 int confusion/restless, PRN pain meds given    CV: HR  sys <160   Resp: RA   GI/: regular- make sure to sit up, Last BM 12/29, BGL insulin gtt alg 4 +2 , UOP adequate   Skin:midline incision abd binder in place    Lines: PICC x 3       Intake/Output Summary (Last 24 hours) at 12/30/2024 0717  Last data filed at 12/30/2024 0700  Gross per 24 hour   Intake 2541.98 ml   Output 700 ml   Net 1841.98 ml      For vital signs and complete assessments, please see documentation flowsheets   Timo Garrido RN, BSN       Goal Outcome Evaluation:      Plan of Care Reviewed With: patient    Overall Patient Progress: improvingOverall Patient Progress: improving    Outcome Evaluation: continue poc

## 2024-12-30 NOTE — PROGRESS NOTES
[facility] PROGRESS NOTE     Patient:  Andrew Collier   Date of birth 1965, Medical record number [id number]  Date of Visit:  12/30/2024  Date of Admission: 12/20/2024  Consult Requester:Christiano Obrien *          Assessment and Plan:   ID Problem List  SBO s/p ex lap with small bowel stricture resection and primary anastomosis -> complicated by anastomotic leak and RTOR 12/24 exploratory laparotomy and small bowel resection. Peritoneal fluid with polymicrobial growth   OR cultures = Enterobacter cloacae, Citrobacter freundii, Streptococcus gallolyticus (bovis group), Streptococcus anginosus, Clostridium perfrigens, Prevotella buccae, Lactobacillus, and Candida glabrata.   Streptococcus gallolyticus, Enterobacter cloacae, and Lactobacillus bacteremia +12/24 only, from intraabdominal source above  HIV on Biktarvy    Recommendations:  Recommend CT A/P with contrast today or tomorrow to rule out intraabdominal abscess and guide antibiotic duration  Continue IV cefepime 2g q8 hrs, IV micafungin 100 g q24 hrs, and metronidazole (prefer PO flagyl if tolerating PO intake)  Continue Biktarvy. Repeat HIV genotype pending. Has ID follow up on 3/13/25.  Continue wound cares to abdominal incision, plan for VAC placement    Assessment:  Andrew Collier is a 59 year old male with history significant for recurrent SBO, poorly controlled T2DM, HIV on ART/biktarvy (followed by Dr. Ivory in clinic - CD4 from 11/5/24 was 432 and HIV VL from 12/12/24 was 153) and recent admission for SBO managed conservatively with decompression and gastrograffin challenge who presented to the ED on 12/18 with abdominal pain and CT imaging notable for isolated segment of dilated bowel concerning for closed loop obstruction. S/p OR on 12/20/2024 for ex lap with small bowel stricture resection and primary anastomosis. Course complicated by further abdominal pain, bloating, tachycardia, AMS,  leukopenia 2.4, and fever to 101.1F on 12/24. Repeat CT imaging 12/24 notable for dilation of the bowel proximal to the new anastomosis, with moderate volume of free fluid and free air in the abdomen concerning for anastomotic leak. RTOR evening of 12/24 for exploratory laparotomy, small bowel resection, washout, and anastomotic revision. IntraOp course notable for 125 cm of bowel remaining proximally, and 115 cm remaining distally after resection. Bowel appeared healthy IntraOp.  Patient was unable to be extubated in PACU, primarily due to neurologic concerns, and was transferred to the SICU on minimal vent settings.  An NG tube was placed in the OR (difficult placement). CT chest also demonstrated Multifocal groundglass opacities in the lungs are worrisome for infection + small to moderate right pleural effusion. Patient in the surgical ICU setting, extubated on 12/26 and remains stable on room air.  He is not on vasopressors. Bowel pathology without malignancy. Blood culture from 12/24 positive for Streptococcus species, Lactobacillus, and Enterobacter cloacae each in 1/2 bottle with known GI source. Repeat blood cultures from 12/25 and 12/26 negative to date. Peritoneal fluid culture from OR 12/24 positive for Enterobacter cloacae, Citrobacter freundii, Streptococcus gallolyticus (bovis group), Streptococcus anginosus, Clostridium perfrigens, Prevotella buccae, Lactobacillus, and Candida glabrata.  Zosyn transitioned to meropenem on 12/25/24 and then cefepime on 12/27 + micafungin and flagyl additionally which are covering his known organisms. We recommend repeat CT A/P with contrast today or tomorrow to rule out intraabdominal abscess given recent anastomotic leak / immunocompromised patient / organisms with propensity for abscess formation. Imaging also needed to guide antibiotic duration. If no concern for intraabdominal abscess, can complete 7 days post-operative antibiotic (12/27 - 1/2). This duration will  cover his bacteremia as well. However, if CT has signs for ongoing abdominal infection/un-drained abscess, will need further source control + longer duration antibiotic.     In regard to his HIV, he was recently seen in clinic by Dr. Ivory (12/12/24). Biktarvy continued at that time. Report of relatively good adherence. CD4 from 11/5/24 was 432 so not need for prophylaxis for opportunistic infections. HIV VL from 12/12/24 was 153. Patient had a genotype/phenotype in 2012 and it did not show evidence of resistance. The viral load of 153 likely translates intermittent adherence. It is probably to low of a viral load for a new genotype test but worth to order it (pending). Continue Biktarvy.     ID will continue to follow with you. Recommendations discussed with primary team.    Valeri Marie PA-C  Infectious Diseases  Contact via [employer] or [employer] Paging/Directory    50 MINUTES SPENT BY ME on the date of service doing chart review, history, exam, documentation & further activities per the note.           Interim History and Events:     No acute events over weekend. WBC wnl, up to 9.9 today. Afebrile, vitals stable and on room air. Labs stable. Plan for abdominal wound vac. Has had return of bowel function. He is oriented today, sitter at bedside.   CXR with pulmonary edema, interval decreased opacification of the right lung and moderate size right pleural effusion. He denies fever, chills, nausea, vomiting, URI symptoms. No concerns for PICC. Abdomen hurts - rated 8-8.5/10 but seems slowly improving per patient.     Pertinent Microbiology:  Blood cultures 12/25 and 12/26 with no growth.   Bcx 12/24 = Enterobacter cloacae in 1/2 bottles, and Streptococcus gallolyticus + Lactobacillus in 1/2 bottles each    Peritoneal fluid 12/24 = Clostridium perfringens, Prevotella buccae, Candida glabrata, Enterobacter cloacae x2, Strep gallolyticus (bovis group), Strep anginosus, citrobacter freundii, and broth only Lactobacillus.           HPI:     See ID consult note dated 12/25/24         ROS:   -Focused 5 point ROS completed, pertinent positives and negatives listed above.      Physical Examination:  Temp: 97.8  F (36.6  C) Temp src: Axillary BP: 121/67 Pulse: 96   Resp: 15 SpO2: 98 % O2 Device: None (Room air)      Vitals:    12/25/24 0600 12/25/24 2300 12/27/24 0000 12/30/24 0700   Weight: 81.4 kg (179 lb 7.3 oz) 83.4 kg (183 lb 13.8 oz) 83.3 kg (183 lb 10.3 oz) 78.1 kg (172 lb 2.9 oz)       Constitutional: Pleasant adult male seen sitting in chair, in NAD. Alert and interactive.   HEENT: NCAT, anicteric sclerae, conjunctiva clear. Moist mucous membranes  Respiratory: Non-labored breathing, good air exchange on room air. Lungs are clear to auscultation bilaterally, without wheezing, crackles or rhonchi. No cough noted.   Cardiovascular: Regular rate and rhythm with no murmur, rub or gallop.  GI: Normoactive BS. Abdomen is soft, mildy distended, and tender to palpation. Midline abdominal incision with packing in place + binder, awaiting VAC placement  Skin: Warm and dry. No new rashes or lesions on exposed surfaces.  Musculoskeletal: Extremities grossly normal. No tenderness or edema present.   Neurologic: A &O x3, speech normal, answering questions appropriately. Moves all extremities spontaneously. Grossly non-focal.  Neuropsychiatric: Mentation and affect normal/appropriate.  VAD: PICC is c/d/i with no erythema, drainage, or tenderness.      Medications:  Current Facility-Administered Medications   Medication Dose Route Frequency Provider Last Rate Last Admin    acetaminophen (TYLENOL) tablet 975 mg  975 mg Oral or Feeding Tube Q8H PHOEBE Yulissa Cabrera CNP   975 mg at 12/30/24 0610    bictegravir-emtricitabine-tenofovir (BIKTARVY) -25 MG per tablet 1 tablet  1 tablet Oral Daily Christiano Obrien MD   1 tablet at 12/29/24 1426    ceFEPIme (MAXIPIME) 2 g vial to attach to  mL bag for ADULTS or NS 50 mL  bag for PEDS  2 g Intravenous Q8H Sahbnam Betancur MD   2 g at 12/30/24 0113    enoxaparin ANTICOAGULANT (LOVENOX) injection 40 mg  40 mg Subcutaneous Q24H Libby Duarte MD   40 mg at 12/29/24 2007    heparin lock flush 10 unit/mL injection 5-20 mL  5-20 mL Intracatheter Q24H Bere Howe MD        insulin aspart (NovoLOG) injection (RAPID ACTING)   Subcutaneous Daily with breakfast Neris Parikh MD        insulin aspart (NovoLOG) injection (RAPID ACTING)   Subcutaneous Daily with lunch Neris Parikh MD        insulin aspart (NovoLOG) injection (RAPID ACTING)   Subcutaneous Daily with supper Neris Parikh MD   Given at 12/29/24 2034    insulin glargine (LANTUS PEN) injection 20 Units  20 Units Subcutaneous QAM  Shabnam Betancur MD   20 Units at 12/30/24 0827    Lidocaine (LIDOCARE) 4 % Patch 1 patch  1 patch Transdermal Q24h Libby Duarte MD   1 patch at 12/28/24 0406    lipids plant base (CLINOLIPID) 20 % infusion 250 mL  250 mL Intravenous Once per day on Monday Tuesday Wednesday Thursday Friday Saturday Christiano Obrien MD 20.8 mL/hr at 12/28/24 2128 250 mL at 12/28/24 2128    magnesium oxide (MAG-OX) tablet 400 mg  400 mg Oral Q4H Christiano Obrien MD   400 mg at 12/30/24 0610    melatonin tablet 5 mg  5 mg Oral or Feeding Tube QPM Yulissa Cabrera, CNP   5 mg at 12/29/24 1823    metroNIDAZOLE (FLAGYL) infusion 500 mg  500 mg Intravenous Q8H Shabnam Betancur MD   500 mg at 12/30/24 0358    micafungin (MYCAMINE) 100 mg in sodium chloride 0.9 % 100 mL intermittent infusion  100 mg Intravenous Q24H Shabnam Betancur  mL/hr at 12/29/24 1616 100 mg at 12/29/24 1616    pantoprazole (PROTONIX) IV push injection 40 mg  40 mg Intravenous QAM  de Yulissa Beth CNP   40 mg at 12/30/24 0806    QUEtiapine (SEROquel) tablet 25 mg  25 mg Oral or Feeding Tube BID de Yulissa Beth CNP   25 mg at 12/30/24 0610     QUEtiapine (SEROquel) tablet 50 mg  50 mg Oral or Feeding Tube At Bedtime de La Mater, Yulissa Kristine, CNP   50 mg at 12/29/24 2153    sodium chloride (PF) 0.9% PF flush 3 mL  3 mL Intracatheter Q8H Libby Duarte MD   3 mL at 12/28/24 0202    sodium chloride (PF) 0.9% PF flush 3 mL  3 mL Intracatheter Q8H Libby Duarte MD   3 mL at 12/30/24 0111       Infusions/Drips:  Current Facility-Administered Medications   Medication Dose Route Frequency Provider Last Rate Last Admin    dextrose 10% infusion   Intravenous Continuous PRN Christiano Obrien MD        dextrose 10% infusion   Intravenous Continuous PRN Libby Duarte MD        insulin regular (MYXREDLIN) 1 unit/mL infusion  0-24 Units/hr Intravenous Continuous Libby Duarte MD 6 mL/hr at 12/30/24 0800 6 Units/hr at 12/30/24 0800    parenteral nutrition - ADULT compounded formula   CENTRAL LINE IV TPN CONTINUOUS Christiano Obrien MD 45 mL/hr at 12/30/24 0800 Rate Verify at 12/30/24 0800       Laboratory Data:   Absolute CD4   Date Value Ref Range Status   06/15/2021 540 441 - 2,156 cells/uL Final   08/06/2020 362 (L) 441 - 2,156 cells/uL Final   02/11/2020 490 441 - 2,156 cells/uL Final   01/07/2020 438 (L) 441 - 2,156 cells/uL Final   10/31/2019 401 (L) 441 - 2,156 cells/uL Final   06/07/2019 417 (L) 441 - 2,156 cells/uL Final   04/15/2019 394 (L) 441 - 2,156 cells/uL Final   11/06/2018 235 (L) 441 - 2,156 cells/uL Final   08/15/2018 317 (L) 441 - 2,156 cells/uL Final   07/19/2018 299 (L) 441 - 2,156 cells/uL Final   04/26/2018 299 (L) 441 - 2,156 cells/uL Final   03/07/2018 256 (L) 441 - 2,156 cells/uL Final   12/12/2017 225 (L) 441 - 2,156 cells/uL Final   08/11/2017 247 (L) 441 - 2,156 cells/uL Final   02/22/2017 183 (L) 441 - 2,156 cells/uL Final   11/07/2016 200 (L) 441 - 2,156 cells/uL Final   08/04/2016 181 (L) 441 - 2,156 cells/uL Final   05/20/2016 94 (L) 441 - 2,156 cells/uL Final   02/25/2016 56 (L) 441 - 2,156 cells/uL  Final   01/10/2016 25 (L) 441 - 2,156 cells/uL Final   11/05/2015 31 (L) 441 - 2,156 cells/uL Final   10/06/2015 63 (L) 441 - 2,156 cells/uL Final     Comment:     Effective 12/08/2014, the reference range for this assay has changed to   reflect   new methodology.     06/29/2015 141 (L) 441 - 2,156 cells/uL Final     Comment:     Effective 12/08/2014, the reference range for this assay has changed to   reflect   new methodology.     05/26/2015 72 (L) 441 - 2,156 cells/uL Final     Comment:     Effective 12/08/2014, the reference range for this assay has changed to   reflect   new methodology.     04/10/2015 26 (L) 441 - 2,156 cells/uL Final     Comment:     Effective 12/08/2014, the reference range for this assay has changed to   reflect   new methodology.       Absolute CD4, Petros T Cells   Date Value Ref Range Status   11/05/2024 432 (L) 441 - 2,156 cells/uL Final   10/10/2024 423 (L) 441 - 2,156 cells/uL Final   07/25/2024 467 441 - 2,156 cells/uL Final   02/06/2024 534 441 - 2,156 cells/uL Final   08/07/2023 525 441 - 2,156 cells/uL Final   05/06/2023 330 (L) 441 - 2,156 cells/uL Final   09/08/2022 365 (L) 441 - 2,156 cells/uL Final   05/24/2022 526 441 - 2,156 cells/uL Final   03/23/2022 489 441-2,156 cells/uL Final   01/22/2022 371 (L) 441-2,156 cells/uL Final   08/24/2021 519 441-2,156 cells/uL Final       Inflammatory Markers    Recent Labs   Lab Test 02/19/22  0451 02/18/22  1603 01/22/22  1640 10/22/21  0600 12/19/20  2153 02/26/20  0552 02/25/20  0637 02/14/20  1032 10/06/18  0658 10/05/18  0818   SED  --   --   --   --  10  --  21* 18  --  23*   CRP 5.6 4.1 34.0* <2.9 7.2 7.9 17.0* 5.0   < > 14.0*    < > = values in this interval not displayed.       Metabolic Studies       Recent Labs   Lab Test 12/30/24  0804 12/30/24  0623 12/30/24  0406 12/30/24  0405 12/30/24  0226 12/30/24  0121 12/29/24  1915 12/29/24  1814 12/29/24  1005 12/29/24  0900 12/28/24  1315 12/28/24  1225 12/28/24  0502 12/28/24  0416  12/27/24  0403 12/27/24  0358 12/26/24  0558 12/26/24  0437 12/26/24  0001 12/25/24  2041 12/25/24  0544 12/25/24  0430 12/25/24  0140 12/24/24  2350 10/23/24  1030 10/23/24  0721 05/18/24  1444 05/18/24  1443 02/17/23  1422 02/17/23  1104   NA  --   --   --  136  --   --   --   --   --  139  --   --   --  147*  --  148*  --  149*  --   --   --  148*  --  145   < > 133*   < > 138   < >  --    POTASSIUM  --   --   --  3.9  --   --   --  3.5  --  3.2*  3.2*  --  3.4  --  2.9*  --  3.8  --  3.5  --   --    < > 3.3*  --  3.7   < > 3.6   < > 3.9   < >  --    CHLORIDE  --   --   --  105  --   --   --   --   --  106  --   --   --  115*  --  120*  --  122*  --   --   --  120*  --  116*   < > 101   < > 102   < >  --    CO2  --   --   --  23  --   --   --   --   --  24  --   --   --  24  --  20*  --  19*  --   --   --  18*  --  17*   < > 20*   < > 22   < >  --    ANIONGAP  --   --   --  8  --   --   --   --   --  9  --   --   --  8  --  8  --  8  --   --   --  10  --  12   < > 12   < > 14   < >  --    BUN  --   --   --  17.3  --   --   --   --   --  18.4  --   --   --  27.9*  --  45.7*  --  42.8*  --   --   --  37.6*  --  43.8*   < > 7.3*   < > 34.1*   < >  --    CR  --   --   --  0.68  --   --   --   --   --  0.66*  --   --   --  0.71  --  0.83  --  0.95  --   --   --  1.22*  --  1.29*   < > 0.65*   < > 1.01   < >  --    GFRESTIMATED  --   --   --  >90  --   --   --   --   --  >90  --   --   --  >90  --  >90  --  >90  --   --   --  67  --  63   < > >90   < > 85   < >  --    * 147* 129* 138* 98 121*   < >  --    < > 171*   < >  --    < > 150*   < > 191*   < > 156*   < > 104*   < > 99   < > 200*   < > 440*   < > 117*   < >  --    A1C  --   --   --   --   --   --   --   --   --   --   --   --   --   --   --   --   --   --   --   --   --   --   --   --   --  10.5*   < >  --    < >  --    LINDA  --   --   --  7.2*  --   --   --   --   --  7.3*  --   --   --  8.3*  --  8.1*  --  8.1*  --   --   --  8.5*  --  8.7*   < > 8.8    < > 10.0   < >  --    PHOS  --   --   --  3.1  --   --   --   --   --  2.4*  --   --   --  2.6  --  2.9  --  2.1*  --   --    < > 1.6*  --   --    < >  --    < >  --    < >  --    MAG  --   --   --  1.8  --   --   --   --   --  1.6*  --   --   --  1.9  --  2.1  --  2.3  --   --   --  2.2  --   --    < >  --    < > 2.1   < >  --    LACT  --   --   --   --   --   --   --   --   --   --   --   --   --   --   --   --   --   --   --  1.0  --   --   --  1.4   < >  --    < >  --    < >  --    CKT  --   --   --   --   --   --   --   --   --   --   --   --   --   --   --   --   --   --   --   --   --   --   --   --   --   --   --  382*  --  39    < > = values in this interval not displayed.       Hepatic Studies    Recent Labs   Lab Test 12/30/24 0405 12/26/24 0437 12/21/24  0921 12/20/24  1506 12/18/24  0709 12/17/24  1516   BILITOTAL 0.7 0.5 1.3* 0.6 0.6 0.8   ALKPHOS 117 65 72 132 99 141   ALBUMIN 1.8* 2.0* 3.4* 4.1 3.5 4.2   AST 57* 59* 18 23 17 19   ALT 9 11 <5 12 <5 14       Pancreatitis testing    Recent Labs   Lab Test 12/30/24 0405 12/25/24 0430 12/20/24  1506 12/17/24  1516 11/21/24  0145 11/11/24  1548 10/21/24  1031 10/15/24  0030 10/11/24  2211 10/10/24  1736 09/28/24  0938 07/25/24  0934 12/07/22  2319 10/13/22  1551 10/21/21  2345 08/24/21  1622 11/11/20  0919 09/25/20  0716 01/30/18  2039 01/24/18  0925 12/28/16  1540 12/26/16  2213   AMYLASE  --   --   --   --   --   --   --   --   --   --   --   --   --   --   --  50  --   --   --  75  --  71   LIPASE  --   --  57 68* 64* 40 73* 29 27 20   < >  --    < >  --    < > 223   < >  --    < > 408*   < > 398*   TRIG 76 112  --   --   --   --   --   --   --   --   --  95  --  259*  --  405*  --  136   < >  --    < >  --     < > = values in this interval not displayed.       Hematology Studies      Recent Labs   Lab Test 12/30/24  0405 12/29/24  0900 12/28/24  0416 12/27/24  0358 12/26/24  2354 12/26/24  0810 12/25/24  0430 07/13/21  1100 06/24/21  1931  06/15/21  1153 03/10/21  2020 01/21/21  0243 01/10/21  1413 12/19/20  2153   WBC 9.9 7.9 7.0 6.9  --  5.8 3.9*   < > 9.6 7.4 12.0* 9.6 9.2 8.8   ANEU  --   --   --   --   --   --   --   --  5.6 4.1 8.1 6.7 6.1 5.2   ALYM  --   --   --   --   --   --   --   --  2.8 2.5 2.6 2.0 2.1 2.5   BRITT  --   --   --   --   --   --   --   --  0.9 0.5 0.8 0.6 0.6 0.7   AEOS  --   --   --   --   --   --   --   --  0.1 0.2 0.2 0.2 0.2 0.3   HGB 8.7* 9.0* 10.1* 9.6*  --  9.1* 10.9*   < > 13.5 14.3 16.0 14.1 14.7 16.2   HCT 27.4* 28.5* 31.3* 30.9*  --  28.5* 32.1*   < > 39.5* 41.7 45.6 40.8 42.7 47.6    186 193 186   < > 157 247   < > 386 355 437 359 361 356    < > = values in this interval not displayed.       Arterial Blood Gas Testing    Recent Labs   Lab Test 12/25/24  0816 12/25/24  0432 12/25/24  0024 12/24/24  2312 12/24/24  1936   PH 7.45 7.44 7.38 7.36 7.31*   PCO2 28* 30* 32* 33* 33*   PO2 126* 78* 92 89 88   HCO3 20* 20* 19* 19* 17*   O2PER 40 40 40 21.0 34.0        Urine Studies     Recent Labs   Lab Test 12/24/24  1659 12/20/24  1513 12/17/24  1628 11/21/24  0152 11/12/24  1823   URINEPH 6.0 5.5 5.5 6.0 6.5   NITRITE Negative Negative Negative Negative Negative   LEUKEST Negative Negative Negative Negative Negative   WBCU 1 <1 2 <1 1       Vancomycin Levels     Recent Labs   Lab Test 02/18/23  1000 02/17/23  0706   VANCOMYCIN 16.9 9.6       Tobramycin levels     No lab results found.    Gentamicin levels    No lab results found.    CSF testing   No lab results found.      Microbiology:  Culture   Date Value Ref Range Status   12/26/2024 No growth after 3 days  Preliminary   12/26/2024 No growth after 3 days  Preliminary   12/25/2024 No Growth  Final   12/25/2024 No Growth  Final   12/24/2024 Culture in progress  Preliminary   12/24/2024 4+ Clostridium perfringens (A)  Preliminary     Comment:     Susceptibilities not routinely done, refer to antibiogram to view typical susceptibility profiles   12/24/2024 1+  Prevotella buccae (A)  Preliminary     Comment:     Susceptibilities not routinely done, refer to antibiogram to view typical susceptibility profiles   12/24/2024 Candida glabrata complex (A)  Preliminary   12/24/2024 4+ Enterobacter cloacae complex (A)  Final   12/24/2024 4+ Enterobacter cloacae complex (A)  Final   12/24/2024 (A)  Final    4+ Streptococcus gallolyticus (Streptococcus bovis group)     Comment:     This organism is susceptible to ampicillin, penicillin, vancomycin and the cephalosporins. If treatment is required and your patient is allergic to penicillin, contact the microbiology lab within 5 days to request susceptibility testing.   12/24/2024 4+ Streptococcus anginosus (A)  Final     Comment:     This organism is susceptible to ampicillin, penicillin, vancomycin and the cephalosporins. If treatment is required and your patient is allergic to penicillin, contact the microbiology lab within 5 days to request susceptibility testing.   12/24/2024 4+ Citrobacter freundii complex (A)  Final   12/24/2024 Isolated in broth only Lactobacillus species (A)  Final     Comment:     On day 3 of incubation  Identification obtained by MALDI-TOF mass spectrometry research use only database. Test characteristics determined and verified by the [facility].  Susceptibilities not routinely done, refer to antibiogram to view typical susceptibility profiles   12/24/2024 Positive on the 3rd day of incubation (A)  Final   12/24/2024 Enterobacter cloacae complex (AA)  Final     Comment:     1 of 2 bottles   12/24/2024 Positive on the 1st day of incubation (A)  Final   12/24/2024 (AA)  Final    Streptococcus gallolyticus (Streptococcus bovis group)     Comment:     1 of 2 bottles  The recovery of this organism from a single blood culture bottle most likely represents contamination. If susceptibility testing is needed, please refer to the antibiogram or contact IDDL. If contamination is  suspected, it is important to repeat two sets of blood cultures from two different sites.  This organism is susceptible to ampicillin, penicillin, vancomycin and the cephalosporins. If treatment is required and your patient is allergic to penicillin, contact the microbiology lab within 5 days to request susceptibility testing.   12/24/2024 Lactobacillus species (AA)  Final     Comment:     1 of 2 bottles  Identification obtained by MALDI-TOF mass spectrometry research use only database. Test characteristics determined and verified by the [facility].  The recovery of this organism from a single blood culture bottle most likely represents contamination. If susceptibility testing is needed, please refer to the antibiogram or contact [facility]. If contamination is suspected, it is important to repeat two sets of blood cultures from two different sites.   10/08/2024 Staphylococcus aureus MRSA (A)  Final   09/25/2023 3+ Streptococcus pyogenes (Group A Streptococcus) (A)  Final     Comment:     This organism is susceptible to ampicillin, penicillin, vancomycin and the cephalosporins. If treatment is required and your patient is allergic to penicillin, contact the microbiology lab within 5 days to request susceptibility testing.   09/25/2023 2+ Staphylococcus aureus (A)  Final   09/25/2023 (A)  Final    1+ Streptococcus agalactiae (Group B Streptococcus)     Comment:     This organism is susceptible to ampicillin, penicillin, vancomycin and the cephalosporins. If treatment is required and your patient is allergic to penicillin, contact the microbiology lab within 5 days to request susceptibility testing.   09/25/2023 2+ Normal sushma  Final   02/16/2023 No Growth  Final   02/16/2023 No Growth  Final   02/06/2023 No Growth  Final   02/06/2023 No Growth  Final   12/17/2022 No Growth  Final   12/17/2022 No Growth  Final   12/08/2022 No Growth  Final   12/08/2022 No Growth  Final   04/29/2022 No Growth   Final       Last check of C difficile  C Diff Toxin B PCR   Date Value Ref Range Status   01/15/2020 Negative NEG^Negative Final     Comment:     Negative: C. difficile target DNA sequences NOT detected, presumed negative   for C.difficile toxin B or the number of bacteria present may be below the   limit of detection for the test.  FDA approved assay performed using [employer] GeneXpert real-time PCR.  A negative result does not exclude actual disease due to Clostridium difficile   and may be due to improper collection, handling and storage of the specimen   or the number of organisms in the specimen is below the detection limit of the   assay.       C Difficile Toxin B by PCR   Date Value Ref Range Status   12/24/2022 Negative Negative Final     Comment:     A negative result does not exclude actual disease due to C. difficile and may be due to improper collection, handling and storage of the specimen or the number of organisms in the specimen is below the detection limit of the assay.       Imaging:  XR Chest Port 1 View  Result Date: 12/30/2024  Impression: 1. Mildly decreased mixed interstitial and airspace opacities bilaterally suggestive of pulmonary edema. Superimposed moderate right and small left pleural effusions. 2. Stable cardiomegaly.    XR Chest Port 1 View  Result Date: 12/29/2024  Impression: 1. Diffuse mixed interstitial and airspace opacities bilaterally suggestive of pulmonary edema with interval decreased opacification of the right lung and moderate size right pleural effusion. 2. Stable cardiomegaly.     CT Head w/o Contrast  Result Date: 12/24/2024  Impression: No acute intracranial pathology. Small parietal cortical calcification is unchanged. Scattered few chronic appearing gray-white matter differentiation loss in the right frontal convexity similar to prior, likely representing chronic tiny microinfarcts. No definite acute infarct identified.     CT Chest/Abdomen/Pelvis w Contrast  Result  Date: 12/24/2024  IMPRESSION: 1. New small to moderate volume of free fluid with free air in the abdomen is concerning for anastomotic leak in the setting of a short segment of small bowel dilatation and edema near the surgical anastomosis. 2. Multifocal groundglass opacities in the lungs are worrisome for infection. Additionally, there is a small to moderate right pleural effusion with significant compressive atelectasis of the right lower lobe, and a small consolidated density in the left lower lobe. [Urgent Result: Free fluid and free air in the abdomen concerning for bowel rupture]     XR Chest Port 1 View  Result Date: 12/23/2024  Impression: 1. Multiple distended loops of large bowel in the upper abdomen were not visualized on most recent CT on 12/20/2024. This may represent worsening ileus/obstruction in this patient admitted for bowel obstruction. Consider evaluation of the abdomen with a CT if desired. 2. Low lung volumes with right hemidiaphragm elevation favored to be atelectasis/decreased inspiratory effort. 3. New cardiomegaly, however an element of this may be related to AP technique.

## 2024-12-31 ENCOUNTER — APPOINTMENT (OUTPATIENT)
Dept: GENERAL RADIOLOGY | Facility: CLINIC | Age: 59
End: 2024-12-31
Payer: COMMERCIAL

## 2024-12-31 LAB
ANION GAP SERPL CALCULATED.3IONS-SCNC: 8 MMOL/L (ref 7–15)
BACTERIA BLD CULT: NO GROWTH
BACTERIA BLD CULT: NO GROWTH
BACTERIA PRT CULT: ABNORMAL
BUN SERPL-MCNC: 17.1 MG/DL (ref 8–23)
CALCIUM SERPL-MCNC: 7.4 MG/DL (ref 8.8–10.4)
CHLORIDE SERPL-SCNC: 102 MMOL/L (ref 98–107)
CREAT SERPL-MCNC: 0.68 MG/DL (ref 0.67–1.17)
EGFRCR SERPLBLD CKD-EPI 2021: >90 ML/MIN/1.73M2
ERYTHROCYTE [DISTWIDTH] IN BLOOD BY AUTOMATED COUNT: 14.5 % (ref 10–15)
GLUCOSE BLDC GLUCOMTR-MCNC: 108 MG/DL (ref 70–99)
GLUCOSE BLDC GLUCOMTR-MCNC: 110 MG/DL (ref 70–99)
GLUCOSE BLDC GLUCOMTR-MCNC: 120 MG/DL (ref 70–99)
GLUCOSE BLDC GLUCOMTR-MCNC: 121 MG/DL (ref 70–99)
GLUCOSE BLDC GLUCOMTR-MCNC: 121 MG/DL (ref 70–99)
GLUCOSE BLDC GLUCOMTR-MCNC: 128 MG/DL (ref 70–99)
GLUCOSE BLDC GLUCOMTR-MCNC: 130 MG/DL (ref 70–99)
GLUCOSE BLDC GLUCOMTR-MCNC: 133 MG/DL (ref 70–99)
GLUCOSE BLDC GLUCOMTR-MCNC: 134 MG/DL (ref 70–99)
GLUCOSE BLDC GLUCOMTR-MCNC: 135 MG/DL (ref 70–99)
GLUCOSE BLDC GLUCOMTR-MCNC: 137 MG/DL (ref 70–99)
GLUCOSE BLDC GLUCOMTR-MCNC: 137 MG/DL (ref 70–99)
GLUCOSE BLDC GLUCOMTR-MCNC: 142 MG/DL (ref 70–99)
GLUCOSE BLDC GLUCOMTR-MCNC: 143 MG/DL (ref 70–99)
GLUCOSE BLDC GLUCOMTR-MCNC: 144 MG/DL (ref 70–99)
GLUCOSE BLDC GLUCOMTR-MCNC: 149 MG/DL (ref 70–99)
GLUCOSE BLDC GLUCOMTR-MCNC: 154 MG/DL (ref 70–99)
GLUCOSE BLDC GLUCOMTR-MCNC: 156 MG/DL (ref 70–99)
GLUCOSE BLDC GLUCOMTR-MCNC: 161 MG/DL (ref 70–99)
GLUCOSE SERPL-MCNC: 132 MG/DL (ref 70–99)
HCO3 SERPL-SCNC: 23 MMOL/L (ref 22–29)
HCT VFR BLD AUTO: 26.2 % (ref 40–53)
HGB BLD-MCNC: 8.7 G/DL (ref 13.3–17.7)
MAGNESIUM SERPL-MCNC: 1.8 MG/DL (ref 1.7–2.3)
MCH RBC QN AUTO: 28.3 PG (ref 26.5–33)
MCHC RBC AUTO-ENTMCNC: 33.2 G/DL (ref 31.5–36.5)
MCV RBC AUTO: 85 FL (ref 78–100)
PHOSPHATE SERPL-MCNC: 2.7 MG/DL (ref 2.5–4.5)
PLATELET # BLD AUTO: 237 10E3/UL (ref 150–450)
POTASSIUM SERPL-SCNC: 3.6 MMOL/L (ref 3.4–5.3)
RBC # BLD AUTO: 3.07 10E6/UL (ref 4.4–5.9)
SODIUM SERPL-SCNC: 133 MMOL/L (ref 135–145)
WBC # BLD AUTO: 10 10E3/UL (ref 4–11)

## 2024-12-31 PROCEDURE — 250N000013 HC RX MED GY IP 250 OP 250 PS 637: Performed by: SURGERY

## 2024-12-31 PROCEDURE — 258N000003 HC RX IP 258 OP 636

## 2024-12-31 PROCEDURE — 80048 BASIC METABOLIC PNL TOTAL CA: CPT

## 2024-12-31 PROCEDURE — 250N000013 HC RX MED GY IP 250 OP 250 PS 637: Performed by: STUDENT IN AN ORGANIZED HEALTH CARE EDUCATION/TRAINING PROGRAM

## 2024-12-31 PROCEDURE — 36592 COLLECT BLOOD FROM PICC: CPT

## 2024-12-31 PROCEDURE — 250N000009 HC RX 250: Performed by: NURSE PRACTITIONER

## 2024-12-31 PROCEDURE — 250N000009 HC RX 250: Performed by: SURGERY

## 2024-12-31 PROCEDURE — 71045 X-RAY EXAM CHEST 1 VIEW: CPT

## 2024-12-31 PROCEDURE — 82565 ASSAY OF CREATININE: CPT

## 2024-12-31 PROCEDURE — 250N000011 HC RX IP 250 OP 636

## 2024-12-31 PROCEDURE — 250N000009 HC RX 250: Performed by: STUDENT IN AN ORGANIZED HEALTH CARE EDUCATION/TRAINING PROGRAM

## 2024-12-31 PROCEDURE — 85027 COMPLETE CBC AUTOMATED: CPT

## 2024-12-31 PROCEDURE — 84100 ASSAY OF PHOSPHORUS: CPT | Performed by: SURGERY

## 2024-12-31 PROCEDURE — 250N000013 HC RX MED GY IP 250 OP 250 PS 637: Performed by: PHYSICIAN ASSISTANT

## 2024-12-31 PROCEDURE — 97605 NEG PRS WND THER DME<=50SQCM: CPT

## 2024-12-31 PROCEDURE — 250N000013 HC RX MED GY IP 250 OP 250 PS 637: Performed by: NURSE PRACTITIONER

## 2024-12-31 PROCEDURE — 99255 IP/OBS CONSLTJ NEW/EST HI 80: CPT | Performed by: PHYSICIAN ASSISTANT

## 2024-12-31 PROCEDURE — G0463 HOSPITAL OUTPT CLINIC VISIT: HCPCS

## 2024-12-31 PROCEDURE — 83735 ASSAY OF MAGNESIUM: CPT | Performed by: SURGERY

## 2024-12-31 PROCEDURE — 120N000002 HC R&B MED SURG/OB UMMC

## 2024-12-31 PROCEDURE — 250N000011 HC RX IP 250 OP 636: Performed by: STUDENT IN AN ORGANIZED HEALTH CARE EDUCATION/TRAINING PROGRAM

## 2024-12-31 PROCEDURE — 250N000013 HC RX MED GY IP 250 OP 250 PS 637

## 2024-12-31 PROCEDURE — B4185 PARENTERAL SOL 10 GM LIPIDS: HCPCS | Performed by: SURGERY

## 2024-12-31 RX ORDER — MAGNESIUM OXIDE 400 MG/1
400 TABLET ORAL EVERY 4 HOURS
Status: COMPLETED | OUTPATIENT
Start: 2024-12-31 | End: 2024-12-31

## 2024-12-31 RX ORDER — HYDROMORPHONE HCL IN WATER/PF 6 MG/30 ML
0.4 PATIENT CONTROLLED ANALGESIA SYRINGE INTRAVENOUS
Status: DISCONTINUED | OUTPATIENT
Start: 2024-12-31 | End: 2025-01-03

## 2024-12-31 RX ORDER — POTASSIUM CHLORIDE 750 MG/1
20 TABLET, EXTENDED RELEASE ORAL ONCE
Status: COMPLETED | OUTPATIENT
Start: 2024-12-31 | End: 2024-12-31

## 2024-12-31 RX ORDER — HYDROMORPHONE HCL IN WATER/PF 6 MG/30 ML
0.2 PATIENT CONTROLLED ANALGESIA SYRINGE INTRAVENOUS
Status: DISCONTINUED | OUTPATIENT
Start: 2024-12-31 | End: 2025-01-03

## 2024-12-31 RX ORDER — METRONIDAZOLE 500 MG/1
500 TABLET ORAL 3 TIMES DAILY
Status: DISCONTINUED | OUTPATIENT
Start: 2024-12-31 | End: 2025-01-02

## 2024-12-31 RX ADMIN — HYDROMORPHONE HYDROCHLORIDE 0.4 MG: 0.2 INJECTION, SOLUTION INTRAMUSCULAR; INTRAVENOUS; SUBCUTANEOUS at 16:07

## 2024-12-31 RX ADMIN — CEFEPIME HYDROCHLORIDE 2 G: 2 INJECTION, POWDER, FOR SOLUTION INTRAVENOUS at 10:32

## 2024-12-31 RX ADMIN — MAGNESIUM OXIDE TAB 400 MG (241.3 MG ELEMENTAL MG) 400 MG: 400 (241.3 MG) TAB at 08:06

## 2024-12-31 RX ADMIN — HYDROMORPHONE HYDROCHLORIDE 0.2 MG: 0.2 INJECTION, SOLUTION INTRAMUSCULAR; INTRAVENOUS; SUBCUTANEOUS at 14:25

## 2024-12-31 RX ADMIN — INSULIN HUMAN 7 UNITS/HR: 1 INJECTION, SOLUTION INTRAVENOUS at 03:41

## 2024-12-31 RX ADMIN — Medication 5 MG: at 18:02

## 2024-12-31 RX ADMIN — QUETIAPINE FUMARATE 50 MG: 50 TABLET ORAL at 21:36

## 2024-12-31 RX ADMIN — PANTOPRAZOLE SODIUM 40 MG: 40 INJECTION, POWDER, FOR SOLUTION INTRAVENOUS at 08:07

## 2024-12-31 RX ADMIN — POTASSIUM & SODIUM PHOSPHATES POWDER PACK 280-160-250 MG 1 PACKET: 280-160-250 PACK at 08:06

## 2024-12-31 RX ADMIN — OXYCODONE HYDROCHLORIDE 5 MG: 5 TABLET ORAL at 18:02

## 2024-12-31 RX ADMIN — OXYCODONE HYDROCHLORIDE 5 MG: 5 TABLET ORAL at 08:22

## 2024-12-31 RX ADMIN — BICTEGRAVIR SODIUM, EMTRICITABINE, AND TENOFOVIR ALAFENAMIDE FUMARATE 1 TABLET: 50; 200; 25 TABLET ORAL at 14:19

## 2024-12-31 RX ADMIN — LIDOCAINE 4% 1 PATCH: 40 PATCH TOPICAL at 20:50

## 2024-12-31 RX ADMIN — OXYCODONE HYDROCHLORIDE 5 MG: 5 TABLET ORAL at 12:34

## 2024-12-31 RX ADMIN — LIDOCAINE 4% 1 PATCH: 40 PATCH TOPICAL at 04:32

## 2024-12-31 RX ADMIN — HYDROMORPHONE HYDROCHLORIDE 0.4 MG: 0.2 INJECTION, SOLUTION INTRAMUSCULAR; INTRAVENOUS; SUBCUTANEOUS at 20:50

## 2024-12-31 RX ADMIN — OXYCODONE HYDROCHLORIDE 5 MG: 5 TABLET ORAL at 22:59

## 2024-12-31 RX ADMIN — POTASSIUM CHLORIDE 20 MEQ: 750 TABLET, EXTENDED RELEASE ORAL at 08:06

## 2024-12-31 RX ADMIN — OLIVE OIL AND SOYBEAN OIL 250 ML: 16; 4 INJECTION, EMULSION INTRAVENOUS at 20:40

## 2024-12-31 RX ADMIN — HYDROMORPHONE HYDROCHLORIDE 0.2 MG: 0.2 INJECTION, SOLUTION INTRAMUSCULAR; INTRAVENOUS; SUBCUTANEOUS at 10:31

## 2024-12-31 RX ADMIN — HYDROMORPHONE HYDROCHLORIDE 0.4 MG: 0.2 INJECTION, SOLUTION INTRAMUSCULAR; INTRAVENOUS; SUBCUTANEOUS at 00:54

## 2024-12-31 RX ADMIN — MAGNESIUM SULFATE HEPTAHYDRATE: 500 INJECTION, SOLUTION INTRAMUSCULAR; INTRAVENOUS at 20:36

## 2024-12-31 RX ADMIN — METRONIDAZOLE 500 MG: 500 TABLET ORAL at 20:43

## 2024-12-31 RX ADMIN — INSULIN HUMAN 4 UNITS/HR: 1 INJECTION, SOLUTION INTRAVENOUS at 15:18

## 2024-12-31 RX ADMIN — ACETAMINOPHEN 975 MG: 325 TABLET, FILM COATED ORAL at 06:03

## 2024-12-31 RX ADMIN — ENOXAPARIN SODIUM 40 MG: 40 INJECTION SUBCUTANEOUS at 20:43

## 2024-12-31 RX ADMIN — ACETAMINOPHEN 975 MG: 325 TABLET, FILM COATED ORAL at 14:19

## 2024-12-31 RX ADMIN — MAGNESIUM OXIDE TAB 400 MG (241.3 MG ELEMENTAL MG) 400 MG: 400 (241.3 MG) TAB at 12:34

## 2024-12-31 RX ADMIN — CEFEPIME HYDROCHLORIDE 2 G: 2 INJECTION, POWDER, FOR SOLUTION INTRAVENOUS at 02:05

## 2024-12-31 RX ADMIN — HYDROMORPHONE HYDROCHLORIDE 0.4 MG: 0.2 INJECTION, SOLUTION INTRAMUSCULAR; INTRAVENOUS; SUBCUTANEOUS at 19:03

## 2024-12-31 RX ADMIN — MICAFUNGIN 100 MG: 20 INJECTION, POWDER, LYOPHILIZED, FOR SOLUTION INTRAVENOUS at 16:08

## 2024-12-31 RX ADMIN — ACETAMINOPHEN 975 MG: 325 TABLET, FILM COATED ORAL at 21:36

## 2024-12-31 RX ADMIN — METRONIDAZOLE 500 MG: 500 INJECTION, SOLUTION INTRAVENOUS at 12:36

## 2024-12-31 RX ADMIN — CEFEPIME HYDROCHLORIDE 2 G: 2 INJECTION, POWDER, FOR SOLUTION INTRAVENOUS at 18:03

## 2024-12-31 RX ADMIN — POTASSIUM & SODIUM PHOSPHATES POWDER PACK 280-160-250 MG 1 PACKET: 280-160-250 PACK at 12:34

## 2024-12-31 RX ADMIN — METRONIDAZOLE 500 MG: 500 INJECTION, SOLUTION INTRAVENOUS at 04:33

## 2024-12-31 ASSESSMENT — ACTIVITIES OF DAILY LIVING (ADL)
ADLS_ACUITY_SCORE: 48
ADLS_ACUITY_SCORE: 43
ADLS_ACUITY_SCORE: 39
ADLS_ACUITY_SCORE: 43
ADLS_ACUITY_SCORE: 48
ADLS_ACUITY_SCORE: 43
ADLS_ACUITY_SCORE: 39
ADLS_ACUITY_SCORE: 43
ADLS_ACUITY_SCORE: 48
ADLS_ACUITY_SCORE: 43
ADLS_ACUITY_SCORE: 48
ADLS_ACUITY_SCORE: 43
ADLS_ACUITY_SCORE: 48
ADLS_ACUITY_SCORE: 48
ADLS_ACUITY_SCORE: 39
ADLS_ACUITY_SCORE: 43
ADLS_ACUITY_SCORE: 43
ADLS_ACUITY_SCORE: 39

## 2024-12-31 NOTE — PROGRESS NOTES
"Emergency General Surgery Progress Note     Subjective  Wound vac placed yesterday. Abdominal pain is about the same. Having BM and passing flatus. Denies fever, chills, nausea and vomiting. He ate half a sandwich.       Objective  /69 (BP Location: Left arm, Patient Position: Semi-Post's)   Pulse 101   Temp 98.7  F (37.1  C) (Oral)   Resp 20   Ht 1.626 m (5' 4\")   Wt 78.1 kg (172 lb 2.9 oz)   SpO2 94%   BMI 29.55 kg/m      General: Alert, mumbling through questioning   CV: RRR  Resp: No increased work of breathing  Abd: soft, tender to palpation, mildly distended,  no peritoneal signs, midline incision with wound vac in place and holding suction.    Ext: wwp    Recent Labs   Lab 12/31/24  0557 12/30/24  0405 12/29/24  0900   WBC 10.0 9.9 7.9   HGB 8.7* 8.7* 9.0*    193 186       Recent Labs   Lab 12/31/24  0736 12/31/24  0646 12/31/24  0557 12/30/24  0406 12/30/24  0405 12/29/24  1915 12/29/24  1814 12/29/24  1005 12/29/24  0900   NA  --   --  133*  --  136  --   --   --  139   POTASSIUM  --   --  3.6  --  3.9  --  3.5  --  3.2*  3.2*   CHLORIDE  --   --  102  --  105  --   --   --  106   CO2  --   --  23  --  23  --   --   --  24   BUN  --   --  17.1  --  17.3  --   --   --  18.4   CR  --   --  0.68  --  0.68  --   --   --  0.66*   * 137* 132*   < > 138*   < >  --    < > 171*   LINDA  --   --  7.4*  --  7.2*  --   --   --  7.3*   MAG  --   --  1.8  --  1.8  --   --   --  1.6*   PHOS  --   --  2.7  --  3.1  --   --   --  2.4*    < > = values in this interval not displayed.       Recent Labs   Lab 12/25/24  2041 12/24/24  2350 12/24/24  2312   LACT 1.0 1.4 1.4       Recent Labs   Lab 12/30/24  0405 12/26/24  0437   INR 1.30* 1.16*       Recent Labs   Lab 12/30/24  0405 12/26/24  0437   AST 57* 59*   ALT 9 11   ALKPHOS 117 65   BILITOTAL 0.7 0.5   ALBUMIN 1.8* 2.0*   INR 1.30* 1.16*      Imaging:  CT ABDOMEN PELVIS W CONTRAST 12/24/2024     IMPRESSION:   1. Postsurgical changes of small " bowel resection. There is moderate  amount of mesenteric edema and ascites. No drainable fluid collection.  Small amount of postsurgical pneumoperitoneum.  2. No significant change in multifocal groundglass opacities of the  lungs, which may indicate infection versus atelectasis/edema.   3. Small-to-moderate bilateral pleural effusions with overlying  compressive atelectasis.  4. Prominent subcapsular fluid adjacent to the liver.      A/P: 59yoM with h/o T2DM, HIV on ART, and recent admission from 12/17-12/19 for SBO managed successfully with bowel rest/GGC who presented to ED again on 12/20 for recurrent SBO s/p ex lap/SBR on 12/20. Now s/p ex-lap/SBR/re-anastomosis for anastomotic leak on 12/24. Extubated 12/26 AM. Now with anterograde bowel function and working to optimize appetite and improve oral intake.      POD#7- Still with poor appetite. Will continue TPN nutritional support. CT A/P with moderate amount mesenteric edema and ascites, with no evidence of intraabdominal abscess. Will consult endocrine to help wean insulin gtt. Will start on dispo planning to TCU as patient unlikely to be accepted with wound vac to [facility] treatment center. Will transition to PO metronidazole per ID.    - Continue regular diet   - Continue TPN  - Continue cefepime and micafungin. Start PO metronidazole  - Endocrine consult, recs pending  - Continue with wound vac  - Replete electrolytes   - Appreciate ID recs   - Strict I/O  - Dispo: TCU likely. Appreciate SW help.      Patient seen, examined and discussed with chief resident, who will discuss with staff surgeon.     Av Cormier MD  Urology, PGY-1 on EGS Service

## 2024-12-31 NOTE — CONSULTS
[facility]  WO Nurse Inpatient Assessment     Consulted for: Midline incision, consult for vac placement    Patient History (according to provider note(s):      59yoM with h/o T2DM, HIV on ART, and recent admission from 12/17-12/19 for SBO managed successfully with bowel rest/GGC who presented to ED again on 12/20 for recurrent SBO s/p ex lap/SBR on 12/20. Now s/p ex-lap/SBR/re-anastomosis for anastomotic leak on 12/24. Extubated 12/26 AM.     Assessment:      Areas visualized during today's visit: Abdomen    Negative pressure wound therapy applied to: midline abdomen      Last photo: 12/30   Wound due to: Surgical Wound   Wound history/plan of care:    Surgical date: 12/24   Service following: Gen surgery   Date Negative Pressure Wound Therapy initiated: 12/30   Interventions in place: N/A  Is patient s nutritional status compromised? no   If yes, what interventions are in place? N/A  Reason for initiating vac therapy? Presence of co-morbidities, High risk of infections, and Need for accelerated granulation tissue  Which?of?the?following?co-morbidities?apply? Diabetes  If diabetic is patient on a diabetic management program? Yes   Is osteomyelitis present in wound? no   If yes what treatments are in place? N/A    Wound base: 100 % marbled Granulation tissue and Muscle,      Palpation of the wound bed: normal       Drainage: moderate      Volume in cannister: 0     Last cannister change date: 12/30 new canister      Description of drainage: serosanguinous      Measurements (length x width x depth, in cm) 20  x 3  x  3.5 cm       Tunneling N/A      Undermining N/A   Periwound skin: Intact       Color: normal and consistent with surrounding tissue       Temperature: normal    Odor: none   Pain: moderate, aching and tender   Pain intervention prior to dressing change: patient tolerated well and slow and gentle cares   Treatment goal: Increase granulation and  "Protection  STATUS: initial assessment   Supplies ordered: gathered and at bedside    Number of foam pieces removed from a wound (excluding foam for bridge) :  vac placed today    Verified this matched the number of foam pieces applied last dressing change: N/A   Number of foam pieces packed into wound (excluding foam for bridge) :  1 GranuFoam Black and 1 Oil emulsion dressing       Treatment Plan:     Negative pressure wound therapy plan:  Wound location: midline abdomin    Change Days:  M/Th  by WOC RN for the week of 12/30 then return to [facility]   Supplies (including all accessories) used: medium Black foam , Adaptic/Curity oil emulsion contact layer , and Cavilon no sting barrier film  Cleanse with Vashe prior to replacing NPWT  Suction setting: -125   Methods used: Window paned all periwound skin with vac drape prior to applying sponge    Staff RN to assess integrity of dressing and ensure suction is set at appropriate level every shift.   Date canister. Chart canister output every shift. Change cannister weekly and PRN if full/occluded     Remove foam dressing and replace with BID normal saline moist gauze dressing if:   -a dressing failure which cannot be repaired within 2 hours   -patient is discharging to home without a home pump   -patient is discharging to a facility outside the local area   -if a dressing is a \"Silver Foam\", remove before Radiation Therapy or MRI     The hospital VAC pump is not to be discharged with the patient. Please disconnect the patient from the machine prior to discharge.  If a home VAC pump has been delivered, connect the home cannister to dressing tubing and the cannister to the home pump, turn on home pump  If the patient is transferring to a nearby facility with a VAC, the tubing can be disconnected, clamp tubing and cover the end with a glove, then can be reconnected if within 2 hours  If transfer will be longer than 2 hours, dressing must be removed and placed with a wet to " moist gauze dressing for transfer       Orders: Written    RECOMMEND PRIMARY TEAM ORDER: None, at this time  Education provided: plan of care  Discussed plan of care with: Patient and Nurse  [facility] nurse follow-up plan: Tuesday/Friday  Notify WO if wound(s) deteriorate.  Nursing to notify the Provider(s) and re-consult the [facility] Nurse if new skin concern.    DATA:     Current support surface: Standard  Standard gel mattress (Isoflex)  Containment of urine/stool: Incontinence Protocol  BMI: Body mass index is 29.55 kg/m .   Active diet order: Orders Placed This Encounter      Regular Diet Adult     Output: I/O last 3 completed shifts:  In: 2331 [P.O.:2070; I.V.:36]  Out: 1575 [Urine:1575]     Labs:   Recent Labs   Lab 12/31/24  0557 12/30/24  0405   ALBUMIN  --  1.8*   HGB 8.7* 8.7*   INR  --  1.30*   WBC 10.0 9.9     Pressure injury risk assessment:   Sensory Perception: 4-->no impairment  Moisture: 3-->occasionally moist  Activity: 3-->walks occasionally  Mobility: 3-->slightly limited  Nutrition: 2-->probably inadequate  Friction and Shear: 2-->potential problem  Estevan Score: 17    Rianna Mathew RN CWOCN  Please contact through [facility]: [facility] Nurse [place]  Dept. Office Number: [phone]

## 2024-12-31 NOTE — PROGRESS NOTES
CLINICAL NUTRITION SERVICES - REASSESSMENT NOTE     Nutrition Prescription    RECOMMENDATIONS FOR MDs/PROVIDERS TO ORDER:  -Consider endocrine consult for glycemic management ( currently patient is on insulin drip per discussion with pharmacy)    - Will plan to cut down to TPN/ dextrose tomorrow to new goal of 180 grams /day  ( currently at 260 gram dextrose /day) and will plan to start 18 hour cycle TPN regimen tomorrow evening followed by 12 hour cycle overnight TPN.     Malnutrition Status:    Patient does not meet two of the established criteria necessary for diagnosing malnutrition but is at risk for malnutrition    Recommendations already ordered by Registered Dietitian (RD):  None today, discussed with pharmacy    Future/Additional Recommendations:  Addendum 1/1: Send TPN change order for the following:     Dosing wt: 65 kg adjusted   Access: PICC    TPN goal:   Volume: Start 18 hour cycle PN on 1/1 @ 1080 ml PN volume or per pharmacy  Dextrose: Decrease to 180 gram /day ,   AA: 100 gram /day (  no change)  Lipids: 250 ml 20% IV lipids 6x per week (no change)    Provision: 1441 Kcals (22 Kcals/kg), 100 gram protein/day (1.5 g/kg protein), 180 gram carb/day (GIR 2.9 mg/kg peak), and 30% fat kcals on average daily.          EVALUATION OF THE PROGRESS TOWARD GOALS   Diet:   Regular diet started 12/29    Nutrition Support: TPN  Dosing wt: 65 kg  Access: PICC  TPN goal: 1080 ml PN volume, @ 45 ml/hr   Dextrose: 260 gram, AA: 100 gram with 250 ml 20% IV lipids 6x per week.     Provision: 1712 Kcals (26 Kcals/kg), 1.5 g/kg protein, GIR 2.79 mg/kg actual weight/minute, and 25% fat kcals on average daily.     On insulin drip ( endocrine not following)?    Intake:   Visited with patient today. Patient report not eating at all. Having significant pain.       NEW FINDINGS   Transferred to medicine floor last night  Chart reviewed: PMH of T2DM, HIV on ART, and recent admission from 12/17-12/19 for SBO managed successfully  with bowel rest/GGC.    - Presented to ED again on  for recurrent SBO s/p ex lap/SBR on .   - S/p ex-lap/SBR/re-anastomosis for anastomotic leak on .   - Extubated  AM.     Wt trend:  Admit wt: 81.4 kg (179 lb 7.3 oz) on  bed scale   Recent wt: 78.1 kg (172 lb 2.9 oz) on  bed scale     Labs noted:  Na+: 133 (L)  K+: 3.6, Mg++: 1.8, Phos: 2.7  ALP: 117, ALT:9, AST: 57 (H), total bili: 0.7  TGs: 76  B ( on insulin)    GI/stool:  Last BM: 3x on     Meds:   Insulin Lantus every morning 20 units,   Flagyl    MALNUTRITION  % Intake: Decreased intake does not meet criteria, on TPN  % Weight Loss: None noted  Subcutaneous Fat Loss: None observed  Muscle Loss: Unable to assess  Fluid Accumulation/Edema: trace  Malnutrition Diagnosis: Patient does not meet two of the established criteria necessary for diagnosing malnutrition but is at risk for malnutrition    Previous Goals   Total avg nutritional intake to meet a minimum of 25 kcal/kg and 1.5 g PRO/kg daily (per dosing wt 64.7 kg).  Evaluation: Met with TPN    Previous Nutrition Diagnosis  Altered GI function related to bowel resection as evidenced by need for TPN to meet nutrition needs.     Evaluation: No change    CURRENT NUTRITION DIAGNOSIS  Altered GI function related to bowel resection as evidenced by need for TPN to meet nutrition needs.       INTERVENTIONS  Implementation  Parenteral Nutrition/IV Fluids -     Goals  otal avg nutritional intake to meet a minimum of 25 kcal/kg and 1.5 g PRO/kg daily (per dosing wt 64.7 kg).       Monitoring/Evaluation  Progress toward goals will be monitored and evaluated per protocol.    Romy Chaves RD/LD  Unit 7C ([id number]) and ([id number]),  Monday-Friday: Vocera -> (7C clinical Dietitian),   Weekend/Holiday: Vocera -> (Weekend Clinical Dietitian)

## 2024-12-31 NOTE — PROVIDER NOTIFICATION
"Provider notified (name): Dr. Franklin Rodas  Reason for notification: Wound Vac Orders, per day RN note, wound vac placed 12/30 afternoon. No documentation or orders from [facility] or team. Team paged to verify wound vac pressure and placement.  Recommendation/request given to provider: \"Wound vac placed in AM but no orders or ADL. Running at -125. Ok to keep running at this rate? Thanks! \"  Response from provider: provider called to confirm, ok to keep at -125 and will touch with team this morning.    "

## 2024-12-31 NOTE — PROGRESS NOTES
Goal Outcome Evaluation:     Overall Patient Progress: improving     Outcome Evaluation: AxOx4, forgetful. VSS on RA. Up with assist x1. Voids spontaneously with urinal. 1 episode of diarrhea this shift. Bed alarm on. Pain 7-10/10, managed with scheduled tylenol and prn oxy and dilaudid. PICC- TPN and lipids infusing. Wound vac on at -125.

## 2024-12-31 NOTE — PLAN OF CARE
Goal Outcome Evaluation: Progressing      A&Ox4, forgetful at times, VSS on RA, incisional abdominal pain 7-9/10, managed with scheduled meds and prn oxy and dilaudid, and abdominal binder. Up with Ax1 to commode, bed alarm on, wound vac in place running at -125 (refer to provider notification note). Two soft stools overnight, voiding spontaneously. Gave 3x IV abx overnight, insulin drip algorithm 4+.
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
## 2025-01-01 LAB
ANION GAP SERPL CALCULATED.3IONS-SCNC: 10 MMOL/L (ref 7–15)
BUN SERPL-MCNC: 15.7 MG/DL (ref 8–23)
CALCIUM SERPL-MCNC: 7.7 MG/DL (ref 8.8–10.4)
CHLORIDE SERPL-SCNC: 96 MMOL/L (ref 98–107)
CREAT SERPL-MCNC: 0.64 MG/DL (ref 0.67–1.17)
EGFRCR SERPLBLD CKD-EPI 2021: >90 ML/MIN/1.73M2
ERYTHROCYTE [DISTWIDTH] IN BLOOD BY AUTOMATED COUNT: 14.4 % (ref 10–15)
GLUCOSE BLDC GLUCOMTR-MCNC: 201 MG/DL (ref 70–99)
GLUCOSE BLDC GLUCOMTR-MCNC: 208 MG/DL (ref 70–99)
GLUCOSE BLDC GLUCOMTR-MCNC: 212 MG/DL (ref 70–99)
GLUCOSE BLDC GLUCOMTR-MCNC: 221 MG/DL (ref 70–99)
GLUCOSE BLDC GLUCOMTR-MCNC: 231 MG/DL (ref 70–99)
GLUCOSE BLDC GLUCOMTR-MCNC: 232 MG/DL (ref 70–99)
GLUCOSE BLDC GLUCOMTR-MCNC: 232 MG/DL (ref 70–99)
GLUCOSE SERPL-MCNC: 229 MG/DL (ref 70–99)
HCO3 SERPL-SCNC: 23 MMOL/L (ref 22–29)
HCT VFR BLD AUTO: 27.5 % (ref 40–53)
HGB BLD-MCNC: 9.1 G/DL (ref 13.3–17.7)
MAGNESIUM SERPL-MCNC: 1.8 MG/DL (ref 1.7–2.3)
MCH RBC QN AUTO: 27.8 PG (ref 26.5–33)
MCHC RBC AUTO-ENTMCNC: 33.1 G/DL (ref 31.5–36.5)
MCV RBC AUTO: 84 FL (ref 78–100)
PHOSPHATE SERPL-MCNC: 2.9 MG/DL (ref 2.5–4.5)
PLATELET # BLD AUTO: 306 10E3/UL (ref 150–450)
POTASSIUM SERPL-SCNC: 4.3 MMOL/L (ref 3.4–5.3)
RBC # BLD AUTO: 3.27 10E6/UL (ref 4.4–5.9)
SODIUM SERPL-SCNC: 129 MMOL/L (ref 135–145)
WBC # BLD AUTO: 10.9 10E3/UL (ref 4–11)

## 2025-01-01 PROCEDURE — B4185 PARENTERAL SOL 10 GM LIPIDS: HCPCS | Performed by: SURGERY

## 2025-01-01 PROCEDURE — 258N000003 HC RX IP 258 OP 636

## 2025-01-01 PROCEDURE — 250N000011 HC RX IP 250 OP 636

## 2025-01-01 PROCEDURE — 250N000013 HC RX MED GY IP 250 OP 250 PS 637

## 2025-01-01 PROCEDURE — 250N000011 HC RX IP 250 OP 636: Performed by: STUDENT IN AN ORGANIZED HEALTH CARE EDUCATION/TRAINING PROGRAM

## 2025-01-01 PROCEDURE — 250N000009 HC RX 250: Performed by: SURGERY

## 2025-01-01 PROCEDURE — 250N000013 HC RX MED GY IP 250 OP 250 PS 637: Performed by: STUDENT IN AN ORGANIZED HEALTH CARE EDUCATION/TRAINING PROGRAM

## 2025-01-01 PROCEDURE — 83735 ASSAY OF MAGNESIUM: CPT | Performed by: SURGERY

## 2025-01-01 PROCEDURE — 84100 ASSAY OF PHOSPHORUS: CPT | Performed by: SURGERY

## 2025-01-01 PROCEDURE — 85027 COMPLETE CBC AUTOMATED: CPT

## 2025-01-01 PROCEDURE — 250N000013 HC RX MED GY IP 250 OP 250 PS 637: Performed by: SURGERY

## 2025-01-01 PROCEDURE — 99232 SBSQ HOSP IP/OBS MODERATE 35: CPT | Mod: GC | Performed by: INTERNAL MEDICINE

## 2025-01-01 PROCEDURE — 99233 SBSQ HOSP IP/OBS HIGH 50: CPT | Performed by: STUDENT IN AN ORGANIZED HEALTH CARE EDUCATION/TRAINING PROGRAM

## 2025-01-01 PROCEDURE — 250N000013 HC RX MED GY IP 250 OP 250 PS 637: Performed by: NURSE PRACTITIONER

## 2025-01-01 PROCEDURE — 36415 COLL VENOUS BLD VENIPUNCTURE: CPT

## 2025-01-01 PROCEDURE — 250N000009 HC RX 250: Performed by: NURSE PRACTITIONER

## 2025-01-01 PROCEDURE — 120N000002 HC R&B MED SURG/OB UMMC

## 2025-01-01 PROCEDURE — 80048 BASIC METABOLIC PNL TOTAL CA: CPT

## 2025-01-01 PROCEDURE — 82565 ASSAY OF CREATININE: CPT

## 2025-01-01 PROCEDURE — 250N000013 HC RX MED GY IP 250 OP 250 PS 637: Performed by: PHYSICIAN ASSISTANT

## 2025-01-01 RX ORDER — OXYCODONE HYDROCHLORIDE 10 MG/1
10 TABLET ORAL EVERY 4 HOURS PRN
Status: DISCONTINUED | OUTPATIENT
Start: 2025-01-01 | End: 2025-01-08

## 2025-01-01 RX ORDER — SIMETHICONE 80 MG
80 TABLET,CHEWABLE ORAL EVERY 6 HOURS PRN
Status: DISCONTINUED | OUTPATIENT
Start: 2025-01-01 | End: 2025-01-10

## 2025-01-01 RX ORDER — MAGNESIUM OXIDE 400 MG/1
400 TABLET ORAL EVERY 4 HOURS
Status: COMPLETED | OUTPATIENT
Start: 2025-01-01 | End: 2025-01-01

## 2025-01-01 RX ORDER — OXYCODONE HYDROCHLORIDE 5 MG/1
5 TABLET ORAL EVERY 4 HOURS PRN
Status: DISCONTINUED | OUTPATIENT
Start: 2025-01-01 | End: 2025-01-12

## 2025-01-01 RX ADMIN — HYDROMORPHONE HYDROCHLORIDE 0.4 MG: 0.2 INJECTION, SOLUTION INTRAMUSCULAR; INTRAVENOUS; SUBCUTANEOUS at 17:07

## 2025-01-01 RX ADMIN — Medication 5 MG: at 18:38

## 2025-01-01 RX ADMIN — MAGNESIUM OXIDE TAB 400 MG (241.3 MG ELEMENTAL MG) 400 MG: 400 (241.3 MG) TAB at 12:52

## 2025-01-01 RX ADMIN — METRONIDAZOLE 500 MG: 500 TABLET ORAL at 08:39

## 2025-01-01 RX ADMIN — ENOXAPARIN SODIUM 40 MG: 40 INJECTION SUBCUTANEOUS at 19:59

## 2025-01-01 RX ADMIN — HYDROMORPHONE HYDROCHLORIDE 0.4 MG: 0.2 INJECTION, SOLUTION INTRAMUSCULAR; INTRAVENOUS; SUBCUTANEOUS at 00:13

## 2025-01-01 RX ADMIN — CEFEPIME HYDROCHLORIDE 2 G: 2 INJECTION, POWDER, FOR SOLUTION INTRAVENOUS at 09:43

## 2025-01-01 RX ADMIN — HYDROMORPHONE HYDROCHLORIDE 0.4 MG: 0.2 INJECTION, SOLUTION INTRAMUSCULAR; INTRAVENOUS; SUBCUTANEOUS at 07:03

## 2025-01-01 RX ADMIN — ACETAMINOPHEN 975 MG: 325 TABLET, FILM COATED ORAL at 14:37

## 2025-01-01 RX ADMIN — OXYCODONE HYDROCHLORIDE 10 MG: 10 TABLET ORAL at 22:43

## 2025-01-01 RX ADMIN — HYDROMORPHONE HYDROCHLORIDE 0.4 MG: 0.2 INJECTION, SOLUTION INTRAMUSCULAR; INTRAVENOUS; SUBCUTANEOUS at 23:38

## 2025-01-01 RX ADMIN — OLIVE OIL AND SOYBEAN OIL 250 ML: 16; 4 INJECTION, EMULSION INTRAVENOUS at 19:49

## 2025-01-01 RX ADMIN — HYDROMORPHONE HYDROCHLORIDE 0.4 MG: 0.2 INJECTION, SOLUTION INTRAMUSCULAR; INTRAVENOUS; SUBCUTANEOUS at 19:47

## 2025-01-01 RX ADMIN — OXYCODONE HYDROCHLORIDE 10 MG: 10 TABLET ORAL at 18:38

## 2025-01-01 RX ADMIN — CEFEPIME HYDROCHLORIDE 2 G: 2 INJECTION, POWDER, FOR SOLUTION INTRAVENOUS at 18:40

## 2025-01-01 RX ADMIN — OXYCODONE HYDROCHLORIDE 10 MG: 10 TABLET ORAL at 12:52

## 2025-01-01 RX ADMIN — HYDROMORPHONE HYDROCHLORIDE 0.4 MG: 0.2 INJECTION, SOLUTION INTRAMUSCULAR; INTRAVENOUS; SUBCUTANEOUS at 09:40

## 2025-01-01 RX ADMIN — MICAFUNGIN 100 MG: 20 INJECTION, POWDER, LYOPHILIZED, FOR SOLUTION INTRAVENOUS at 17:14

## 2025-01-01 RX ADMIN — MAGNESIUM OXIDE TAB 400 MG (241.3 MG ELEMENTAL MG) 400 MG: 400 (241.3 MG) TAB at 14:38

## 2025-01-01 RX ADMIN — SIMETHICONE CHEW TAB 80 MG 80 MG: 80 TABLET ORAL at 18:38

## 2025-01-01 RX ADMIN — MAGNESIUM SULFATE HEPTAHYDRATE: 500 INJECTION, SOLUTION INTRAMUSCULAR; INTRAVENOUS at 19:50

## 2025-01-01 RX ADMIN — HYDROMORPHONE HYDROCHLORIDE 0.4 MG: 0.2 INJECTION, SOLUTION INTRAMUSCULAR; INTRAVENOUS; SUBCUTANEOUS at 02:00

## 2025-01-01 RX ADMIN — BICTEGRAVIR SODIUM, EMTRICITABINE, AND TENOFOVIR ALAFENAMIDE FUMARATE 1 TABLET: 50; 200; 25 TABLET ORAL at 14:38

## 2025-01-01 RX ADMIN — OXYCODONE HYDROCHLORIDE 5 MG: 5 TABLET ORAL at 06:05

## 2025-01-01 RX ADMIN — CEFEPIME HYDROCHLORIDE 2 G: 2 INJECTION, POWDER, FOR SOLUTION INTRAVENOUS at 01:58

## 2025-01-01 RX ADMIN — METRONIDAZOLE 500 MG: 500 TABLET ORAL at 14:37

## 2025-01-01 RX ADMIN — METRONIDAZOLE 500 MG: 500 TABLET ORAL at 19:47

## 2025-01-01 RX ADMIN — ACETAMINOPHEN 975 MG: 325 TABLET, FILM COATED ORAL at 21:29

## 2025-01-01 RX ADMIN — ACETAMINOPHEN 975 MG: 325 TABLET, FILM COATED ORAL at 06:05

## 2025-01-01 RX ADMIN — QUETIAPINE FUMARATE 50 MG: 50 TABLET ORAL at 21:29

## 2025-01-01 RX ADMIN — LIDOCAINE 4% 1 PATCH: 40 PATCH TOPICAL at 19:59

## 2025-01-01 RX ADMIN — PANTOPRAZOLE SODIUM 40 MG: 40 INJECTION, POWDER, FOR SOLUTION INTRAVENOUS at 08:40

## 2025-01-01 ASSESSMENT — ACTIVITIES OF DAILY LIVING (ADL)
ADLS_ACUITY_SCORE: 41
ADLS_ACUITY_SCORE: 39
ADLS_ACUITY_SCORE: 50
ADLS_ACUITY_SCORE: 43
ADLS_ACUITY_SCORE: 50
ADLS_ACUITY_SCORE: 39
ADLS_ACUITY_SCORE: 44
ADLS_ACUITY_SCORE: 39
ADLS_ACUITY_SCORE: 41
ADLS_ACUITY_SCORE: 50
ADLS_ACUITY_SCORE: 50
ADLS_ACUITY_SCORE: 41
ADLS_ACUITY_SCORE: 50
ADLS_ACUITY_SCORE: 39
ADLS_ACUITY_SCORE: 50
ADLS_ACUITY_SCORE: 39
ADLS_ACUITY_SCORE: 39
ADLS_ACUITY_SCORE: 41
ADLS_ACUITY_SCORE: 50
ADLS_ACUITY_SCORE: 50
ADLS_ACUITY_SCORE: 41
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
## 2025-01-01 NOTE — PROGRESS NOTES
Calorie Count  Intake recorded for: 12/31  Total Kcals: 0 Total Protein: 0g  Kcals from Hospital Food: 0   Protein: 0g  Kcals from Outside Food (average):0 Protein: 0g  # Meals Ordered from Kitchen: no meals ordered from kitchen.   # Meals Recorded: no intake recorded.   # Supplements Recorded: no intake recorded.

## 2025-01-01 NOTE — PROGRESS NOTES
Care Management Follow Up    Length of Stay (days): 12  Expected Discharge Date: 01/03/2025  Concerns to be Addressed: discharge planning     Patient plan of care discussed at interdisciplinary rounds: Yes  Anticipated Discharge Disposition: Transitional Care  Anticipated Discharge Services: None  Anticipated Discharge DME: None  Patient/family educated on Medicare website which has current facility and service quality ratings: yes  Education Provided on the Discharge Plan: Yes  Patient/Family in Agreement with the Plan: yes  Referrals Placed by CM/SW: Post Acute Facilities  Private pay costs discussed: Not applicable  Discussed  Partnership in Safe Discharge Planning  document with patient/family: Yes   Handoff Completed: No, handoff not indicated or clinically appropriate  Additional Information: PT gave dual disposition recs for TCU vs Home vs. CD Tx. Writer to assess whether the pt is interested in TCU. Pt appears too medically complex to return to his PTA CD treatment (TPN, wound vac, Ax1 with GB & walker). Writer met with pt at bedside to discuss dispo recs and medical/psychosocial needs. Pt in agreement to be placed in a TCU. Writer provided the Medicare Care Compare list & education. Pt to make TCU choices tomorrow, as he noted he is in a great deal of pain. Writer advised that there are a handful of TCUs with on-site CD treatment, if the pt is interested.     Pt requested a visit from the nurse manager to discuss his pain medications. Writer notified charge nurse, who will attend to this matter.    Preemptive referral made to the [place] system ([facility], [facility], [facility], [facility]), as they are often willing to consider admitting a pt with active TOMMY. Could also consider [facility], [facility], among several others.    Next Steps: Follow-up with Andrew on 1/2 to obtain additional TCU choices and determine if he would like to be referred for dual  "CD/TCU programs or just TCU.     RADHA Kamara, JOMAR  Clinical , [facility]-7C  Desk Phone: [phone]   Schedule: Wednesday, Thursday, Fridays (for Mondays & Tuesdays, contact job share partner Sharon Nguyen)  Securely message with [employer] (Search Name or 7C Med Surg [phone] SW)  Text page via [facility] Paging/Directory   See signed in provider for up to date coverage information  Social Work & Care Management Department    Weekend And After Hours Care Management Contacts:  After Hours Social Work Coverage [phone] daily: Searchable in Vocera \"Adult SW After Hours On Call\"   Weekend Coverage [phone]: Searchable in Vocera \"7C Med Surg SW\" or \"7C Med Surg RNCC\"  "

## 2025-01-01 NOTE — PLAN OF CARE
"Blood pressure 138/75, pulse 96, temperature 98.1  F (36.7  C), temperature source Oral, resp. rate 20, height 1.626 m (5' 4\"), weight 80.3 kg (177 lb 0.5 oz), SpO2 96%.Via RA     Assumed care at 07:00    Patient A & O X 4 with intermittent forgetful. VSS no respiratory distress noted . Received scheduled medications and C/o abdominal pain received Oxycodone PO X 3, Dilaudid IV X 3 with some relief.  Abdominal distended .Wound VAC patent void adequate in urinal, incontinence of stool x 2 . Patient did note eat report no appetite .  Potassium , Magnesium, and Phosphorus replaced per protocol  recheck in am .   Continue on TPN , Triple Lumen PICC patient . Continue on insulin drip. BS with normal range . Call light  within reach, bed alarm on , patient able to make needs known. Continue to  monitor closely , and  Update MD with change.     Plan per MD Stop insulin gtt at 2030. See MAR     Goal Outcome Evaluation:    Plan of Care Reviewed With: patient    Overall Patient Progress: no change.        "

## 2025-01-01 NOTE — PROGRESS NOTES
"Emergency General Surgery Progress Note     Subjective  Transitioned to PO flagyl. Non DKA insulin drip discontinued. Still endorsing poor appetite and abdominal pain. Denies nausea and emesis. Passing flatus and having bowel movements.      Objective  /78 (BP Location: Left arm, Patient Position: Semi-Post's)   Pulse 101   Temp 98.2  F (36.8  C) (Oral)   Resp 20   Ht 1.626 m (5' 4\")   Wt 80.3 kg (177 lb 0.5 oz)   SpO2 98%   BMI 30.39 kg/m      General: Alert, NAD  CV: RRR  Resp: No increased work of breathing on RA  Abd: soft, tender to palpation, mildly distended,  no peritoneal signs, midline incision with wound vac in place and holding suction.    Ext: WWP    Recent Labs   Lab 01/01/25  0655 12/31/24  0557 12/30/24  0405   WBC 10.9 10.0 9.9   HGB 9.1* 8.7* 8.7*    237 193       Recent Labs   Lab 01/01/25  0820 01/01/25  0655 01/01/25  0459 12/31/24  0646 12/31/24  0557 12/30/24  0406 12/30/24  0405   NA  --  129*  --   --  133*  --  136   POTASSIUM  --  4.3  --   --  3.6  --  3.9   CHLORIDE  --  96*  --   --  102  --  105   CO2  --  23  --   --  23  --  23   BUN  --  15.7  --   --  17.1  --  17.3   CR  --  0.64*  --   --  0.68  --  0.68   * 229* 201*   < > 132*   < > 138*   LINDA  --  7.7*  --   --  7.4*  --  7.2*   MAG  --  1.8  --   --  1.8  --  1.8   PHOS  --  2.9  --   --  2.7  --  3.1    < > = values in this interval not displayed.       Recent Labs   Lab 12/25/24  2041   LACT 1.0       Recent Labs   Lab 12/30/24  0405 12/26/24  0437   INR 1.30* 1.16*       Recent Labs   Lab 12/30/24  0405 12/26/24  0437   AST 57* 59*   ALT 9 11   ALKPHOS 117 65   BILITOTAL 0.7 0.5   ALBUMIN 1.8* 2.0*   INR 1.30* 1.16*      Imaging:  No new imaging       A/P: 59yoM with h/o T2DM, HIV on ART, and recent admission from 12/17-12/19 for SBO managed successfully with bowel rest/GGC who presented to ED again on 12/20 for recurrent SBO s/p ex lap/SBR on 12/20. Now s/p ex-lap/SBR/re-anastomosis for " anastomotic leak on 12/24. Extubated 12/26 AM. Now with anterograde bowel function and working to optimize appetite and improve oral intake.      POD# 8 - Patient is hyponatremic, corrected sodium in setting of hyperglycemia is 132. Will recheck in AM. His insulin drip was discontinued yesterday. For continuing abdominal pain, will increase oxycodone to 10 mg. Goals for today, encourage more PO intake, follow up recommended antibiotic duration per ID and pain optimization.     - Continue MMPC  - Continue regular diet   - Continue TPN  - Continue cefepime, micafungin and metronidazole. Will discontinue aforementioned antimicrobials on 1/2/2025, per ID recommendation  - Continue Biktarvy  - Endocrine following for diabetes/insulin management. Appreciate recs   Insulin drip discontinued, insulin added to TPN, increase novolog meal   coverage, add Novolog Correction Scale.   - Continue with wound vac  - Replete electrolytes   - Appreciate ID recs   - Strict I/O  - Dispo: TCU likely. Appreciate SW help.      Patient seen, examined and discussed with chief resident, who will discuss with staff surgeon.     Av Cormier MD  Urology, PGY-1 on EGS Service

## 2025-01-01 NOTE — PROGRESS NOTES
[facility] PROGRESS NOTE     Patient:  Andrew Collier   Date of birth 1965, Medical record number [id number]  Date of Visit:  01/01/2025  Date of Admission: 12/20/2024  Consult Requester:Christiano Obrien *          Assessment and Plan:   ID Problem List  SBO s/p ex lap with small bowel stricture resection and primary anastomosis -> complicated by anastomotic leak and RTOR 12/24 exploratory laparotomy and small bowel resection. Peritoneal fluid with polymicrobial growth   OR cultures = Enterobacter cloacae, Citrobacter freundii, Streptococcus gallolyticus (bovis group), Streptococcus anginosus, Clostridium perfrigens, Prevotella buccae, Lactobacillus, and Candida glabrata.   Streptococcus gallolyticus, Enterobacter cloacae, and Lactobacillus bacteremia +12/24 only, from intraabdominal source above, repeat NG 12/25  HIV on Biktarvy  TOMMY - IV meth, was at inpatient rehab prior to admission  PICC in place (TPN)    Recommendations:  Continue IV cefepime 2g q8 hrs, IV micafungin 100 g q24 hrs, and metronidazole (prefer PO flagyl if tolerating PO intake). Complete through 1/2/25, then discontinue.  Continue wound cares to abdominal incision, plan for VAC placement  Recommend addiction medicine consult for TOMMY.   Need to consider discharge disposition in setting of IVDU and has PICC for TPN  Continue Biktarvy. Repeat HIV genotype pending. Has ID clinic follow up on 3/13/25.    ID will sign off.    Assessment:  Andrew Collier is a 59 year old male with history significant for recurrent SBO, poorly controlled T2DM, HIV on ART/biktarvy (followed by Dr. Ivory in clinic - CD4 from 11/5/24 was 432 and HIV VL from 12/12/24 was 153) and recent admission for SBO managed conservatively with decompression and gastrograffin challenge who presented to the ED on 12/18 with abdominal pain and CT imaging notable for isolated segment of dilated bowel concerning for closed loop  obstruction. S/p OR on 12/20/2024 for ex lap with small bowel stricture resection and primary anastomosis. Course complicated by further abdominal pain, bloating, tachycardia, AMS, leukopenia 2.4, and fever to 101.1F on 12/24. Repeat CT imaging 12/24 notable for dilation of the bowel proximal to the new anastomosis, with moderate volume of free fluid and free air in the abdomen concerning for anastomotic leak. RTOR evening of 12/24 for exploratory laparotomy, small bowel resection, washout, and anastomotic revision. IntraOp course notable for 125 cm of bowel remaining proximally, and 115 cm remaining distally after resection. Bowel appeared healthy IntraOp.  Patient was unable to be extubated in PACU, primarily due to neurologic concerns, and was transferred to the SICU on minimal vent settings.  An NG tube was placed in the OR (difficult placement). CT chest also demonstrated Multifocal groundglass opacities in the lungs are worrisome for infection + small to moderate right pleural effusion. Patient in the surgical ICU setting, extubated on 12/26 and remains stable on room air.  He is not on vasopressors. Bowel pathology without malignancy. Blood culture from 12/24 positive for Streptococcus species, Lactobacillus, and Enterobacter cloacae each in 1/2 bottle with known GI source. Repeat blood cultures from 12/25 and 12/26 negative to date. Peritoneal fluid culture from OR 12/24 positive for Enterobacter cloacae, Citrobacter freundii, Streptococcus gallolyticus (bovis group), Streptococcus anginosus, Clostridium perfrigens, Prevotella buccae, Lactobacillus, and Candida glabrata.  Zosyn transitioned to meropenem on 12/25/24 and then cefepime on 12/27 + micafungin and flagyl additionally which are covering his known organisms. Repeat CT A/P with contrast 12/30 without evidence of intraabdominal abscess, discussed with radiology. Liver subcapsular hematoma favored and stable from prior CT. Given no intraabdominal  abscess, recommend complete 7 days post-operative antibiotic (12/27 - 1/2). This duration will cover his bacteremia as well. Continue close monitoring while inpatient for signs of recurrent infection given his complex course.    In regard to his HIV, he was recently seen in clinic by Dr. Ivory (12/12/24). Biktarvy continued at that time. Report of relatively good adherence. CD4 from 11/5/24 was 432 so not need for prophylaxis for opportunistic infections. HIV VL from 12/12/24 was 153. Patient had a genotype/phenotype in 2012 and it did not show evidence of resistance. The viral load of 153 likely translates intermittent adherence. It is probably to low of a viral load for a new genotype test but worth to order it (pending). Continue Biktarvy.     ID will sign off. Recommendations discussed with primary team.    Valeri Marie PA-C  Infectious Diseases  Contact via [employer] or Dials Paging/Directory    50 MINUTES SPENT BY ME on the date of service doing chart review, history, exam, documentation & further activities per the note.           Interim History and Events:     No acute events overnight. WBC wnl, slow uptrend Andrew remains afebrile, vitals stable and on room air. Labs stable. Wound VAC in place. Good BM, no diarrhea.     Abdomen hurts - rated 7/10 but seems slowly improving per patient. Pain is primarily over wound vac. No specific RUQ or flank pain.     Pertinent Microbiology:  Blood cultures 12/25 and 12/26 with no growth.   Bcx 12/24 = Enterobacter cloacae in 1/2 bottles, and Streptococcus gallolyticus + Lactobacillus in 1/2 bottles each    Peritoneal fluid 12/24 = Clostridium perfringens, Prevotella buccae, Candida glabrata, Enterobacter cloacae x2, Strep gallolyticus (bovis group), Strep anginosus, citrobacter freundii, and broth only Lactobacillus.          HPI:     See ID consult note dated 12/25/24         ROS:   -Focused 5 point ROS completed, pertinent positives and negatives listed  above.      Physical Examination:  Temp: 98.3  F (36.8  C) Temp src: Oral BP: 130/74 Pulse: 101   Resp: 20 SpO2: 95 % O2 Device: None (Room air)      Vitals:    12/25/24 2300 12/27/24 0000 12/30/24 0700 12/31/24 1423   Weight: 83.4 kg (183 lb 13.8 oz) 83.3 kg (183 lb 10.3 oz) 78.1 kg (172 lb 2.9 oz) 80.3 kg (177 lb 0.5 oz)    01/01/25 1052   Weight: 78.1 kg (172 lb 2.9 oz)       Constitutional: Pleasant adult male seen sitting in chair, in NAD. Alert and interactive.   HEENT: NCAT, anicteric sclerae, conjunctiva clear. Moist mucous membranes  Respiratory: Non-labored breathing, good air exchange on room air. Lungs are clear to auscultation bilaterally, without wheezing, crackles or rhonchi. No cough noted.   Cardiovascular: Regular rate and rhythm with no murmur, rub or gallop.  GI: Normoactive BS. Abdomen is soft, mildy distended, and less tender to palpation. Midline abdominal incision with VAC in place  Skin: Warm and dry. No new rashes or lesions on exposed surfaces.  Musculoskeletal: Extremities grossly normal. No tenderness or edema present.   Neurologic: A &O x3, speech normal, answering questions appropriately. Moves all extremities spontaneously. Grossly non-focal.  Neuropsychiatric: Mentation and affect normal/appropriate.  VAD: PICC is c/d/i with no erythema, drainage, or tenderness.      Medications:  Current Facility-Administered Medications   Medication Dose Route Frequency Provider Last Rate Last Admin    acetaminophen (TYLENOL) tablet 975 mg  975 mg Oral or Feeding Tube Q8H [facility] Yulissa Cabrera, CNP   975 mg at 01/01/25 1437    bictegravir-emtricitabine-tenofovir (BIKTARVY) -25 MG per tablet 1 tablet  1 tablet Oral Daily Christiano Obrien MD   1 tablet at 01/01/25 1438    ceFEPIme (MAXIPIME) 2 g vial to attach to  mL bag for ADULTS or NS 50 mL bag for PEDS  2 g Intravenous Q8H Shabnam Betancur  mL/hr at 12/31/24 1032 2 g at 01/01/25 0943    enoxaparin  ANTICOAGULANT (LOVENOX) injection 40 mg  40 mg Subcutaneous Q24H Libby Duarte MD   40 mg at 12/31/24 2043    heparin lock flush 10 unit/mL injection 5-20 mL  5-20 mL Intracatheter Q24H Bere Howe MD        insulin aspart (NovoLOG) injection (RAPID ACTING)  1-10 Units Subcutaneous TID AC Kate Quintero PA-C   4 Units at 01/01/25 1250    insulin aspart (NovoLOG) injection (RAPID ACTING)  1-7 Units Subcutaneous 2 times per day Kate Quintero PA-C   1 Units at 01/01/25 0207    insulin aspart (NovoLOG) injection (RAPID ACTING)   Subcutaneous Daily with breakfast Kate Quintero PA-C   2 Units at 01/01/25 0939    insulin aspart (NovoLOG) injection (RAPID ACTING)   Subcutaneous Daily with lunch Kate Quintero PA-C        insulin aspart (NovoLOG) injection (RAPID ACTING)   Subcutaneous Daily with supper Kate Quintero PA-C   2 Units at 12/30/24 1622    [START ON 1/2/2025] insulin glargine (LANTUS PEN) injection 20 Units  20 Units Subcutaneous Q24H Ariela Lama MD        Lidocaine (LIDOCARE) 4 % Patch 1 patch  1 patch Transdermal Q24h Libby Duarte MD   1 patch at 12/31/24 2050    lipids plant base (CLINOLIPID) 20 % infusion 250 mL  250 mL Intravenous Once per day on Monday Tuesday Wednesday Thursday Friday Saturday Christiano Obrien MD 20.8 mL/hr at 12/31/24 2040 250 mL at 12/31/24 2040    melatonin tablet 5 mg  5 mg Oral or Feeding Tube QPM Yulissa Cabrera CNP   5 mg at 12/31/24 1802    metroNIDAZOLE (FLAGYL) tablet 500 mg  500 mg Oral TID Av Cormier MD   500 mg at 01/01/25 1437    micafungin (MYCAMINE) 100 mg in sodium chloride 0.9 % 100 mL intermittent infusion  100 mg Intravenous Q24H Shabnam Betancur  mL/hr at 12/31/24 1608 100 mg at 12/31/24 1608    pantoprazole (PROTONIX) IV push injection 40 mg  40 mg Intravenous QAM Yulissa Ovalle, CNP   40 mg at 01/01/25 0840    QUEtiapine (SEROquel) tablet 50 mg  50 mg Oral or Feeding Tube  At Bedtime [facility], Yulissa Carmona, CNP   50 mg at 12/31/24 2136    sodium chloride (PF) 0.9% PF flush 3 mL  3 mL Intracatheter Q8H Libby Duarte MD   3 mL at 01/01/25 0843    sodium chloride (PF) 0.9% PF flush 3 mL  3 mL Intracatheter Q8H Libby Duarte MD   3 mL at 01/01/25 0944       Infusions/Drips:  Current Facility-Administered Medications   Medication Dose Route Frequency Provider Last Rate Last Admin    dextrose 10% infusion   Intravenous Continuous PRN Christiano Obrien MD        dextrose 10% infusion   Intravenous Continuous PRN Libby Duarte MD        parenteral nutrition - ADULT compounded formula CYCLE   CENTRAL LINE IV CYCLE Christiano Obrien MD        parenteral nutrition - ADULT compounded formula   CENTRAL LINE IV TPN CONTINUOUS Christiano Obrien MD 45 mL/hr at 01/01/25 1200 Rate Verify at 01/01/25 1200       Laboratory Data:   Absolute CD4   Date Value Ref Range Status   06/15/2021 540 441 - 2,156 cells/uL Final   08/06/2020 362 (L) 441 - 2,156 cells/uL Final   02/11/2020 490 441 - 2,156 cells/uL Final   01/07/2020 438 (L) 441 - 2,156 cells/uL Final   10/31/2019 401 (L) 441 - 2,156 cells/uL Final   06/07/2019 417 (L) 441 - 2,156 cells/uL Final   04/15/2019 394 (L) 441 - 2,156 cells/uL Final   11/06/2018 235 (L) 441 - 2,156 cells/uL Final   08/15/2018 317 (L) 441 - 2,156 cells/uL Final   07/19/2018 299 (L) 441 - 2,156 cells/uL Final   04/26/2018 299 (L) 441 - 2,156 cells/uL Final   03/07/2018 256 (L) 441 - 2,156 cells/uL Final   12/12/2017 225 (L) 441 - 2,156 cells/uL Final   08/11/2017 247 (L) 441 - 2,156 cells/uL Final   02/22/2017 183 (L) 441 - 2,156 cells/uL Final   11/07/2016 200 (L) 441 - 2,156 cells/uL Final   08/04/2016 181 (L) 441 - 2,156 cells/uL Final   05/20/2016 94 (L) 441 - 2,156 cells/uL Final   02/25/2016 56 (L) 441 - 2,156 cells/uL Final   01/10/2016 25 (L) 441 - 2,156 cells/uL Final   11/05/2015 31 (L) 441 - 2,156 cells/uL Final    10/06/2015 63 (L) 441 - 2,156 cells/uL Final     Comment:     Effective 12/08/2014, the reference range for this assay has changed to   reflect   new methodology.     06/29/2015 141 (L) 441 - 2,156 cells/uL Final     Comment:     Effective 12/08/2014, the reference range for this assay has changed to   reflect   new methodology.     05/26/2015 72 (L) 441 - 2,156 cells/uL Final     Comment:     Effective 12/08/2014, the reference range for this assay has changed to   reflect   new methodology.     04/10/2015 26 (L) 441 - 2,156 cells/uL Final     Comment:     Effective 12/08/2014, the reference range for this assay has changed to   reflect   new methodology.       Absolute CD4, [place] T Cells   Date Value Ref Range Status   11/05/2024 432 (L) 441 - 2,156 cells/uL Final   10/10/2024 423 (L) 441 - 2,156 cells/uL Final   07/25/2024 467 441 - 2,156 cells/uL Final   02/06/2024 534 441 - 2,156 cells/uL Final   08/07/2023 525 441 - 2,156 cells/uL Final   05/06/2023 330 (L) 441 - 2,156 cells/uL Final   09/08/2022 365 (L) 441 - 2,156 cells/uL Final   05/24/2022 526 441 - 2,156 cells/uL Final   03/23/2022 489 441-2,156 cells/uL Final   01/22/2022 371 (L) 441-2,156 cells/uL Final   08/24/2021 519 441-2,156 cells/uL Final       Inflammatory Markers    Recent Labs   Lab Test 02/19/22  0451 02/18/22  1603 01/22/22  1640 10/22/21  0600 12/19/20  2153 02/26/20  0552 02/25/20  0637 02/14/20  1032 10/06/18  0658 10/05/18  0818   SED  --   --   --   --  10  --  21* 18  --  23*   CRP 5.6 4.1 34.0* <2.9 7.2 7.9 17.0* 5.0   < > 14.0*    < > = values in this interval not displayed.       Metabolic Studies       Recent Labs   Lab Test 01/01/25  1212 01/01/25  0820 01/01/25  0655 01/01/25  0459 01/01/25  0206 12/31/24  2110 12/31/24  0646 12/31/24  0557 12/30/24  0406 12/30/24  0405 12/29/24  1915 12/29/24  1814 12/29/24  1005 12/29/24  0900 12/28/24  1315 12/28/24  1225 12/28/24  0502 12/28/24  0416 12/27/24  0403 12/27/24  0358  12/26/24  0001 12/25/24  2041 12/25/24  0140 12/24/24  2350 10/23/24  1030 10/23/24  0721 05/18/24  1444 05/18/24  1443 02/17/23  1422 02/17/23  1104   NA  --   --  129*  --   --   --   --  133*  --  136  --   --   --  139  --   --   --  147*  --  148*   < >  --    < > 145   < > 133*   < > 138   < >  --    POTASSIUM  --   --  4.3  --   --   --   --  3.6  --  3.9  --  3.5  --  3.2*  3.2*  --  3.4  --  2.9*  --  3.8   < >  --    < > 3.7   < > 3.6   < > 3.9   < >  --    CHLORIDE  --   --  96*  --   --   --   --  102  --  105  --   --   --  106  --   --   --  115*  --  120*   < >  --    < > 116*   < > 101   < > 102   < >  --    CO2  --   --  23  --   --   --   --  23  --  23  --   --   --  24  --   --   --  24  --  20*   < >  --    < > 17*   < > 20*   < > 22   < >  --    ANIONGAP  --   --  10  --   --   --   --  8  --  8  --   --   --  9  --   --   --  8  --  8   < >  --    < > 12   < > 12   < > 14   < >  --    BUN  --   --  15.7  --   --   --   --  17.1  --  17.3  --   --   --  18.4  --   --   --  27.9*  --  45.7*   < >  --    < > 43.8*   < > 7.3*   < > 34.1*   < >  --    CR  --   --  0.64*  --   --   --   --  0.68  --  0.68  --   --   --  0.66*  --   --   --  0.71  --  0.83   < >  --    < > 1.29*   < > 0.65*   < > 1.01   < >  --    GFRESTIMATED  --   --  >90  --   --   --   --  >90  --  >90  --   --   --  >90  --   --   --  >90  --  >90   < >  --    < > 63   < > >90   < > 85   < >  --    * 231* 229* 201* 212* 135*   < > 132*   < > 138*   < >  --    < > 171*   < >  --    < > 150*   < > 191*   < > 104*   < > 200*   < > 440*   < > 117*   < >  --    A1C  --   --   --   --   --   --   --   --   --   --   --   --   --   --   --   --   --   --   --   --   --   --   --   --   --  10.5*   < >  --    < >  --    LINDA  --   --  7.7*  --   --   --   --  7.4*  --  7.2*  --   --   --  7.3*  --   --   --  8.3*  --  8.1*   < >  --    < > 8.7*   < > 8.8   < > 10.0   < >  --    PHOS  --   --  2.9  --   --   --   --  2.7  --  3.1   --   --   --  2.4*  --   --   --  2.6  --  2.9   < >  --    < >  --    < >  --    < >  --    < >  --    MAG  --   --  1.8  --   --   --   --  1.8  --  1.8  --   --   --  1.6*  --   --   --  1.9  --  2.1   < >  --    < >  --    < >  --    < > 2.1   < >  --    LACT  --   --   --   --   --   --   --   --   --   --   --   --   --   --   --   --   --   --   --   --   --  1.0  --  1.4   < >  --    < >  --    < >  --    CKT  --   --   --   --   --   --   --   --   --   --   --   --   --   --   --   --   --   --   --   --   --   --   --   --   --   --   --  382*  --  39    < > = values in this interval not displayed.       Hepatic Studies    Recent Labs   Lab Test 12/30/24  0405 12/26/24  0437 12/21/24  0921 12/20/24  1506 12/18/24  0709 12/17/24  1516   BILITOTAL 0.7 0.5 1.3* 0.6 0.6 0.8   ALKPHOS 117 65 72 132 99 141   ALBUMIN 1.8* 2.0* 3.4* 4.1 3.5 4.2   AST 57* 59* 18 23 17 19   ALT 9 11 <5 12 <5 14       Pancreatitis testing    Recent Labs   Lab Test 12/30/24  0405 12/25/24  0430 12/20/24  1506 12/17/24  1516 11/21/24  0145 11/11/24  1548 10/21/24  1031 10/15/24  0030 10/11/24  2211 10/10/24  1736 09/28/24  0938 07/25/24  0934 12/07/22  2319 10/13/22  1551 10/21/21  2345 08/24/21  1622 11/11/20  0919 09/25/20  0716 01/30/18  2039 01/24/18  0925 12/28/16  1540 12/26/16  2213   AMYLASE  --   --   --   --   --   --   --   --   --   --   --   --   --   --   --  50  --   --   --  75  --  71   LIPASE  --   --  57 68* 64* 40 73* 29 27 20   < >  --    < >  --    < > 223   < >  --    < > 408*   < > 398*   TRIG 76 112  --   --   --   --   --   --   --   --   --  95  --  259*  --  405*  --  136   < >  --    < >  --     < > = values in this interval not displayed.       Hematology Studies      Recent Labs   Lab Test 01/01/25  0655 12/31/24  0557 12/30/24  0405 12/29/24  0900 12/28/24  0416 12/27/24  0358 07/13/21  1100 06/24/21  1931 06/15/21  1153 03/10/21  2020 01/21/21  0243 01/10/21  1413 12/19/20  2153   WBC 10.9 10.0 9.9 7.9  7.0 6.9   < > 9.6 7.4 12.0* 9.6 9.2 8.8   ANEU  --   --   --   --   --   --   --  5.6 4.1 8.1 6.7 6.1 5.2   ALYM  --   --   --   --   --   --   --  2.8 2.5 2.6 2.0 2.1 2.5   BRITT  --   --   --   --   --   --   --  0.9 0.5 0.8 0.6 0.6 0.7   AEOS  --   --   --   --   --   --   --  0.1 0.2 0.2 0.2 0.2 0.3   HGB 9.1* 8.7* 8.7* 9.0* 10.1* 9.6*   < > 13.5 14.3 16.0 14.1 14.7 16.2   HCT 27.5* 26.2* 27.4* 28.5* 31.3* 30.9*   < > 39.5* 41.7 45.6 40.8 42.7 47.6    237 193 186 193 186   < > 386 355 437 359 361 356    < > = values in this interval not displayed.       Arterial Blood Gas Testing    Recent Labs   Lab Test 12/25/24  0816 12/25/24  0432 12/25/24  0024 12/24/24  2312 12/24/24  1936   PH 7.45 7.44 7.38 7.36 7.31*   PCO2 28* 30* 32* 33* 33*   PO2 126* 78* 92 89 88   HCO3 20* 20* 19* 19* 17*   O2PER 40 40 40 21.0 34.0        Urine Studies     Recent Labs   Lab Test 12/24/24  1659 12/20/24  1513 12/17/24  1628 11/21/24  0152 11/12/24  1823   URINEPH 6.0 5.5 5.5 6.0 6.5   NITRITE Negative Negative Negative Negative Negative   LEUKEST Negative Negative Negative Negative Negative   WBCU 1 <1 2 <1 1       Vancomycin Levels     Recent Labs   Lab Test 02/18/23  1000 02/17/23  0706   VANCOMYCIN 16.9 9.6       Tobramycin levels     No lab results found.    Gentamicin levels    No lab results found.    CSF testing   No lab results found.      Microbiology:  Culture   Date Value Ref Range Status   12/26/2024 No Growth  Final   12/26/2024 No Growth  Final   12/25/2024 No Growth  Final   12/25/2024 No Growth  Final   12/24/2024 4+ Clostridium perfringens (A)  Final     Comment:     Susceptibilities not routinely done, refer to antibiogram to view typical susceptibility profiles   12/24/2024 1+ Candida glabrata complex (A)  Final     Comment:     Susceptibilities not routinely done, refer to antibiogram to view typical susceptibility profiles   12/24/2024 1+ Prevotella buccae (A)  Final     Comment:     Susceptibilities not  routinely done, refer to antibiogram to view typical susceptibility profiles   12/24/2024 Candida glabrata complex (A)  Preliminary   12/24/2024 4+ Enterobacter cloacae complex (A)  Final   12/24/2024 4+ Enterobacter cloacae complex (A)  Final   12/24/2024 (A)  Final    4+ Streptococcus gallolyticus (Streptococcus bovis group)     Comment:     This organism is susceptible to ampicillin, penicillin, vancomycin and the cephalosporins. If treatment is required and your patient is allergic to penicillin, contact the microbiology lab within 5 days to request susceptibility testing.   12/24/2024 4+ Streptococcus anginosus (A)  Final     Comment:     This organism is susceptible to ampicillin, penicillin, vancomycin and the cephalosporins. If treatment is required and your patient is allergic to penicillin, contact the microbiology lab within 5 days to request susceptibility testing.   12/24/2024 4+ Citrobacter freundii complex (A)  Final   12/24/2024 Isolated in broth only Lactobacillus species (A)  Final     Comment:     On day 3 of incubation  Identification obtained by MALDI-TOF mass spectrometry research use only database. Test characteristics determined and verified by the [facility].  Susceptibilities not routinely done, refer to antibiogram to view typical susceptibility profiles   12/24/2024 Positive on the 3rd day of incubation (A)  Final   12/24/2024 Enterobacter cloacae complex (AA)  Final     Comment:     1 of 2 bottles   12/24/2024 Positive on the 1st day of incubation (A)  Final   12/24/2024 (AA)  Final    Streptococcus gallolyticus (Streptococcus bovis group)     Comment:     1 of 2 bottles  The recovery of this organism from a single blood culture bottle most likely represents contamination. If susceptibility testing is needed, please refer to the antibiogram or contact IDDL. If contamination is suspected, it is important to repeat two sets of blood cultures from two  different sites.  This organism is susceptible to ampicillin, penicillin, vancomycin and the cephalosporins. If treatment is required and your patient is allergic to penicillin, contact the microbiology lab within 5 days to request susceptibility testing.   12/24/2024 Lactobacillus species (AA)  Final     Comment:     1 of 2 bottles  Identification obtained by MALDI-TOF mass spectrometry research use only database. Test characteristics determined and verified by the [facility].  The recovery of this organism from a single blood culture bottle most likely represents contamination. If susceptibility testing is needed, please refer to the antibiogram or contact IDDL. If contamination is suspected, it is important to repeat two sets of blood cultures from two different sites.   10/08/2024 Staphylococcus aureus MRSA (A)  Final   09/25/2023 3+ Streptococcus pyogenes (Group A Streptococcus) (A)  Final     Comment:     This organism is susceptible to ampicillin, penicillin, vancomycin and the cephalosporins. If treatment is required and your patient is allergic to penicillin, contact the microbiology lab within 5 days to request susceptibility testing.   09/25/2023 2+ Staphylococcus aureus (A)  Final   09/25/2023 (A)  Final    1+ Streptococcus agalactiae (Group B Streptococcus)     Comment:     This organism is susceptible to ampicillin, penicillin, vancomycin and the cephalosporins. If treatment is required and your patient is allergic to penicillin, contact the microbiology lab within 5 days to request susceptibility testing.   09/25/2023 2+ Normal sushma  Final   02/16/2023 No Growth  Final   02/16/2023 No Growth  Final   02/06/2023 No Growth  Final   02/06/2023 No Growth  Final   12/17/2022 No Growth  Final   12/17/2022 No Growth  Final   12/08/2022 No Growth  Final   12/08/2022 No Growth  Final   04/29/2022 No Growth  Final       Last check of C difficile  C Diff Toxin B PCR   Date Value  Ref Range Status   01/15/2020 Negative NEG^Negative Final     Comment:     Negative: C. difficile target DNA sequences NOT detected, presumed negative   for C.difficile toxin B or the number of bacteria present may be below the   limit of detection for the test.  FDA approved assay performed using [employer] GeneXpert real-time PCR.  A negative result does not exclude actual disease due to Clostridium difficile   and may be due to improper collection, handling and storage of the specimen   or the number of organisms in the specimen is below the detection limit of the   assay.       C Difficile Toxin B by PCR   Date Value Ref Range Status   12/24/2022 Negative Negative Final     Comment:     A negative result does not exclude actual disease due to C. difficile and may be due to improper collection, handling and storage of the specimen or the number of organisms in the specimen is below the detection limit of the assay.       Imaging:  CT A/P - reviewed and discussed with radiology. No concern for abdominal abscess.    XR Chest Port 1 View  Result Date: 12/30/2024  Impression: 1. Mildly decreased mixed interstitial and airspace opacities bilaterally suggestive of pulmonary edema. Superimposed moderate right and small left pleural effusions. 2. Stable cardiomegaly.    XR Chest Port 1 View  Result Date: 12/29/2024  Impression: 1. Diffuse mixed interstitial and airspace opacities bilaterally suggestive of pulmonary edema with interval decreased opacification of the right lung and moderate size right pleural effusion. 2. Stable cardiomegaly.     CT Head w/o Contrast  Result Date: 12/24/2024  Impression: No acute intracranial pathology. Small parietal cortical calcification is unchanged. Scattered few chronic appearing gray-white matter differentiation loss in the right frontal convexity similar to prior, likely representing chronic tiny microinfarcts. No definite acute infarct identified.     CT Chest/Abdomen/Pelvis w  Contrast  Result Date: 12/24/2024  IMPRESSION: 1. New small to moderate volume of free fluid with free air in the abdomen is concerning for anastomotic leak in the setting of a short segment of small bowel dilatation and edema near the surgical anastomosis. 2. Multifocal groundglass opacities in the lungs are worrisome for infection. Additionally, there is a small to moderate right pleural effusion with significant compressive atelectasis of the right lower lobe, and a small consolidated density in the left lower lobe. [Urgent Result: Free fluid and free air in the abdomen concerning for bowel rupture]     XR Chest Port 1 View  Result Date: 12/23/2024  Impression: 1. Multiple distended loops of large bowel in the upper abdomen were not visualized on most recent CT on 12/20/2024. This may represent worsening ileus/obstruction in this patient admitted for bowel obstruction. Consider evaluation of the abdomen with a CT if desired. 2. Low lung volumes with right hemidiaphragm elevation favored to be atelectasis/decreased inspiratory effort. 3. New cardiomegaly, however an element of this may be related to AP technique.

## 2025-01-01 NOTE — PLAN OF CARE
Goal Outcome Evaluation:      Plan of Care Reviewed With: patient    Overall Patient Progress: no changeOverall Patient Progress: no change    Outcome Evaluation: 5499-4334. Insulin gtt turned off at 2030, bg monitored w sliding scale; 212, 201 post insulin gtt. C/o 9-10/10 abd pain, IV dilaudid and po oxy given. Wound vac CDI at 125, abd binder in place. TPN and lipids infusing. Did not eat. A&OX4, int. forgetful. Vss on RA. Good UOP into urinal. Loose liquid stools, 2 inc and 1 on bsc. Ivabx given. Bed alarm on.

## 2025-01-02 ENCOUNTER — APPOINTMENT (OUTPATIENT)
Dept: PHYSICAL THERAPY | Facility: CLINIC | Age: 60
End: 2025-01-02
Payer: COMMERCIAL

## 2025-01-02 ENCOUNTER — APPOINTMENT (OUTPATIENT)
Dept: SPEECH THERAPY | Facility: CLINIC | Age: 60
End: 2025-01-02
Payer: COMMERCIAL

## 2025-01-02 VITALS
BODY MASS INDEX: 29.13 KG/M2 | SYSTOLIC BLOOD PRESSURE: 149 MMHG | RESPIRATION RATE: 14 BRPM | WEIGHT: 170.64 LBS | HEIGHT: 64 IN | TEMPERATURE: 99.7 F | DIASTOLIC BLOOD PRESSURE: 72 MMHG | HEART RATE: 102 BPM | OXYGEN SATURATION: 98 %

## 2025-01-02 LAB
ALBUMIN SERPL BCG-MCNC: 1.9 G/DL (ref 3.5–5.2)
ALP SERPL-CCNC: 342 U/L (ref 40–150)
ALT SERPL W P-5'-P-CCNC: 6 U/L (ref 0–70)
ANION GAP SERPL CALCULATED.3IONS-SCNC: 9 MMOL/L (ref 7–15)
AST SERPL W P-5'-P-CCNC: 49 U/L (ref 0–45)
BILIRUB DIRECT SERPL-MCNC: <0.2 MG/DL (ref 0–0.3)
BILIRUB SERPL-MCNC: 0.3 MG/DL
BUN SERPL-MCNC: 17.7 MG/DL (ref 8–23)
CALCIUM SERPL-MCNC: 7.6 MG/DL (ref 8.8–10.4)
CHLORIDE SERPL-SCNC: 96 MMOL/L (ref 98–107)
CREAT SERPL-MCNC: 0.65 MG/DL (ref 0.67–1.17)
EGFRCR SERPLBLD CKD-EPI 2021: >90 ML/MIN/1.73M2
ERYTHROCYTE [DISTWIDTH] IN BLOOD BY AUTOMATED COUNT: 14.3 % (ref 10–15)
GLUCOSE BLDC GLUCOMTR-MCNC: 183 MG/DL (ref 70–99)
GLUCOSE BLDC GLUCOMTR-MCNC: 219 MG/DL (ref 70–99)
GLUCOSE BLDC GLUCOMTR-MCNC: 222 MG/DL (ref 70–99)
GLUCOSE BLDC GLUCOMTR-MCNC: 225 MG/DL (ref 70–99)
GLUCOSE BLDC GLUCOMTR-MCNC: 284 MG/DL (ref 70–99)
GLUCOSE SERPL-MCNC: 290 MG/DL (ref 70–99)
HCO3 SERPL-SCNC: 23 MMOL/L (ref 22–29)
HCT VFR BLD AUTO: 24.6 % (ref 40–53)
HGB BLD-MCNC: 8.2 G/DL (ref 13.3–17.7)
INR PPP: 1.3 (ref 0.85–1.15)
MAGNESIUM SERPL-MCNC: 1.8 MG/DL (ref 1.7–2.3)
MCH RBC QN AUTO: 28.2 PG (ref 26.5–33)
MCHC RBC AUTO-ENTMCNC: 33.3 G/DL (ref 31.5–36.5)
MCV RBC AUTO: 85 FL (ref 78–100)
PHOSPHATE SERPL-MCNC: 3.2 MG/DL (ref 2.5–4.5)
PLATELET # BLD AUTO: 340 10E3/UL (ref 150–450)
POTASSIUM SERPL-SCNC: 4.4 MMOL/L (ref 3.4–5.3)
PREALB SERPL-MCNC: 5.1 MG/DL (ref 20–40)
PROT SERPL-MCNC: 5.8 G/DL (ref 6.4–8.3)
RBC # BLD AUTO: 2.91 10E6/UL (ref 4.4–5.9)
SODIUM SERPL-SCNC: 127 MMOL/L (ref 135–145)
SODIUM SERPL-SCNC: 128 MMOL/L (ref 135–145)
SODIUM SERPL-SCNC: 129 MMOL/L (ref 135–145)
WBC # BLD AUTO: 10.2 10E3/UL (ref 4–11)

## 2025-01-02 PROCEDURE — 99232 SBSQ HOSP IP/OBS MODERATE 35: CPT | Performed by: PHYSICIAN ASSISTANT

## 2025-01-02 PROCEDURE — 92526 ORAL FUNCTION THERAPY: CPT | Mod: GN

## 2025-01-02 PROCEDURE — 258N000003 HC RX IP 258 OP 636

## 2025-01-02 PROCEDURE — 250N000011 HC RX IP 250 OP 636: Performed by: STUDENT IN AN ORGANIZED HEALTH CARE EDUCATION/TRAINING PROGRAM

## 2025-01-02 PROCEDURE — 250N000013 HC RX MED GY IP 250 OP 250 PS 637

## 2025-01-02 PROCEDURE — 250N000011 HC RX IP 250 OP 636

## 2025-01-02 PROCEDURE — 82248 BILIRUBIN DIRECT: CPT | Performed by: SURGERY

## 2025-01-02 PROCEDURE — 250N000013 HC RX MED GY IP 250 OP 250 PS 637: Performed by: SURGERY

## 2025-01-02 PROCEDURE — 250N000009 HC RX 250: Performed by: NURSE PRACTITIONER

## 2025-01-02 PROCEDURE — 97530 THERAPEUTIC ACTIVITIES: CPT | Mod: GP

## 2025-01-02 PROCEDURE — 84134 ASSAY OF PREALBUMIN: CPT | Performed by: SURGERY

## 2025-01-02 PROCEDURE — 85610 PROTHROMBIN TIME: CPT | Performed by: SURGERY

## 2025-01-02 PROCEDURE — 36592 COLLECT BLOOD FROM PICC: CPT | Performed by: SURGERY

## 2025-01-02 PROCEDURE — B4185 PARENTERAL SOL 10 GM LIPIDS: HCPCS | Performed by: SURGERY

## 2025-01-02 PROCEDURE — 999N000202 HC STATISTICAL VASC ACCESS NURSE TIME, 1-15 MINUTES

## 2025-01-02 PROCEDURE — 36592 COLLECT BLOOD FROM PICC: CPT

## 2025-01-02 PROCEDURE — 84295 ASSAY OF SERUM SODIUM: CPT

## 2025-01-02 PROCEDURE — 250N000013 HC RX MED GY IP 250 OP 250 PS 637: Performed by: NURSE PRACTITIONER

## 2025-01-02 PROCEDURE — 999N000044 HC STATISTIC CVC DRESSING CHANGE

## 2025-01-02 PROCEDURE — 85041 AUTOMATED RBC COUNT: CPT

## 2025-01-02 PROCEDURE — 97606 NEG PRS WND THER DME>50 SQCM: CPT

## 2025-01-02 PROCEDURE — 82040 ASSAY OF SERUM ALBUMIN: CPT | Performed by: SURGERY

## 2025-01-02 PROCEDURE — 85018 HEMOGLOBIN: CPT

## 2025-01-02 PROCEDURE — 999N000007 HC SITE CHECK

## 2025-01-02 PROCEDURE — 83735 ASSAY OF MAGNESIUM: CPT | Performed by: SURGERY

## 2025-01-02 PROCEDURE — 120N000002 HC R&B MED SURG/OB UMMC

## 2025-01-02 PROCEDURE — 250N000009 HC RX 250: Performed by: SURGERY

## 2025-01-02 PROCEDURE — 250N000013 HC RX MED GY IP 250 OP 250 PS 637: Performed by: STUDENT IN AN ORGANIZED HEALTH CARE EDUCATION/TRAINING PROGRAM

## 2025-01-02 PROCEDURE — 84100 ASSAY OF PHOSPHORUS: CPT | Performed by: SURGERY

## 2025-01-02 RX ORDER — MAGNESIUM OXIDE 400 MG/1
400 TABLET ORAL EVERY 4 HOURS
Status: COMPLETED | OUTPATIENT
Start: 2025-01-02 | End: 2025-01-02

## 2025-01-02 RX ORDER — METRONIDAZOLE 500 MG/1
500 TABLET ORAL 3 TIMES DAILY
Status: COMPLETED | OUTPATIENT
Start: 2025-01-02 | End: 2025-01-02

## 2025-01-02 RX ORDER — SODIUM CHLORIDE 1 G/1
1 TABLET ORAL
Status: DISCONTINUED | OUTPATIENT
Start: 2025-01-02 | End: 2025-01-02

## 2025-01-02 RX ORDER — SODIUM CHLORIDE 1 G/1
1 TABLET ORAL 2 TIMES DAILY WITH MEALS
Status: COMPLETED | OUTPATIENT
Start: 2025-01-02 | End: 2025-01-02

## 2025-01-02 RX ORDER — CEFEPIME HYDROCHLORIDE 2 G/1
2 INJECTION, POWDER, FOR SOLUTION INTRAVENOUS EVERY 8 HOURS
Status: COMPLETED | OUTPATIENT
Start: 2025-01-02 | End: 2025-01-02

## 2025-01-02 RX ORDER — SODIUM CHLORIDE 1 G/1
2 TABLET ORAL ONCE
Status: COMPLETED | OUTPATIENT
Start: 2025-01-02 | End: 2025-01-02

## 2025-01-02 RX ADMIN — OXYCODONE HYDROCHLORIDE 5 MG: 5 TABLET ORAL at 18:56

## 2025-01-02 RX ADMIN — SODIUM CHLORIDE TAB 1 GM 1 G: 1 TAB at 11:02

## 2025-01-02 RX ADMIN — HYDROMORPHONE HYDROCHLORIDE 0.4 MG: 0.2 INJECTION, SOLUTION INTRAMUSCULAR; INTRAVENOUS; SUBCUTANEOUS at 11:00

## 2025-01-02 RX ADMIN — INSULIN ASPART 5 UNITS: 100 INJECTION, SOLUTION INTRAVENOUS; SUBCUTANEOUS at 12:56

## 2025-01-02 RX ADMIN — OXYCODONE HYDROCHLORIDE 10 MG: 10 TABLET ORAL at 22:51

## 2025-01-02 RX ADMIN — LIDOCAINE 4% 1 PATCH: 40 PATCH TOPICAL at 19:49

## 2025-01-02 RX ADMIN — MAGNESIUM OXIDE TAB 400 MG (241.3 MG ELEMENTAL MG) 400 MG: 400 (241.3 MG) TAB at 08:54

## 2025-01-02 RX ADMIN — QUETIAPINE FUMARATE 50 MG: 50 TABLET ORAL at 21:46

## 2025-01-02 RX ADMIN — PANTOPRAZOLE SODIUM 40 MG: 40 INJECTION, POWDER, FOR SOLUTION INTRAVENOUS at 08:55

## 2025-01-02 RX ADMIN — BICTEGRAVIR SODIUM, EMTRICITABINE, AND TENOFOVIR ALAFENAMIDE FUMARATE 1 TABLET: 50; 200; 25 TABLET ORAL at 13:18

## 2025-01-02 RX ADMIN — OLIVE OIL AND SOYBEAN OIL 250 ML: 16; 4 INJECTION, EMULSION INTRAVENOUS at 19:51

## 2025-01-02 RX ADMIN — HYDROMORPHONE HYDROCHLORIDE 0.4 MG: 0.2 INJECTION, SOLUTION INTRAMUSCULAR; INTRAVENOUS; SUBCUTANEOUS at 13:08

## 2025-01-02 RX ADMIN — METRONIDAZOLE 500 MG: 500 TABLET ORAL at 08:54

## 2025-01-02 RX ADMIN — OXYCODONE HYDROCHLORIDE 10 MG: 10 TABLET ORAL at 09:45

## 2025-01-02 RX ADMIN — CEFEPIME HYDROCHLORIDE 2 G: 2 INJECTION, POWDER, FOR SOLUTION INTRAVENOUS at 09:46

## 2025-01-02 RX ADMIN — ACETAMINOPHEN 975 MG: 325 TABLET, FILM COATED ORAL at 13:07

## 2025-01-02 RX ADMIN — MAGNESIUM OXIDE TAB 400 MG (241.3 MG ELEMENTAL MG) 400 MG: 400 (241.3 MG) TAB at 12:55

## 2025-01-02 RX ADMIN — MICAFUNGIN 100 MG: 20 INJECTION, POWDER, LYOPHILIZED, FOR SOLUTION INTRAVENOUS at 17:20

## 2025-01-02 RX ADMIN — OXYCODONE HYDROCHLORIDE 10 MG: 10 TABLET ORAL at 05:35

## 2025-01-02 RX ADMIN — CEFEPIME HYDROCHLORIDE 2 G: 2 INJECTION, POWDER, FOR SOLUTION INTRAVENOUS at 18:50

## 2025-01-02 RX ADMIN — CEFEPIME HYDROCHLORIDE 2 G: 2 INJECTION, POWDER, FOR SOLUTION INTRAVENOUS at 01:59

## 2025-01-02 RX ADMIN — Medication 5 ML: at 12:59

## 2025-01-02 RX ADMIN — Medication 5 MG: at 18:50

## 2025-01-02 RX ADMIN — METRONIDAZOLE 500 MG: 500 TABLET ORAL at 19:49

## 2025-01-02 RX ADMIN — ACETAMINOPHEN 975 MG: 325 TABLET, FILM COATED ORAL at 21:46

## 2025-01-02 RX ADMIN — ENOXAPARIN SODIUM 40 MG: 40 INJECTION SUBCUTANEOUS at 20:07

## 2025-01-02 RX ADMIN — SODIUM CHLORIDE 2 G: 1 TABLET ORAL at 21:43

## 2025-01-02 RX ADMIN — INSULIN ASPART 4 UNITS: 100 INJECTION, SOLUTION INTRAVENOUS; SUBCUTANEOUS at 17:17

## 2025-01-02 RX ADMIN — MAGNESIUM SULFATE HEPTAHYDRATE: 500 INJECTION, SOLUTION INTRAMUSCULAR; INTRAVENOUS at 19:54

## 2025-01-02 RX ADMIN — ACETAMINOPHEN 975 MG: 325 TABLET, FILM COATED ORAL at 05:35

## 2025-01-02 RX ADMIN — METRONIDAZOLE 500 MG: 500 TABLET ORAL at 13:07

## 2025-01-02 ASSESSMENT — ACTIVITIES OF DAILY LIVING (ADL)
ADLS_ACUITY_SCORE: 43
ADLS_ACUITY_SCORE: 43
ADLS_ACUITY_SCORE: 46
ADLS_ACUITY_SCORE: 45
ADLS_ACUITY_SCORE: 45
ADLS_ACUITY_SCORE: 46
ADLS_ACUITY_SCORE: 45
ADLS_ACUITY_SCORE: 46
ADLS_ACUITY_SCORE: 45
ADLS_ACUITY_SCORE: 46
ADLS_ACUITY_SCORE: 43
ADLS_ACUITY_SCORE: 46
ADLS_ACUITY_SCORE: 43
ADLS_ACUITY_SCORE: 46
ADLS_ACUITY_SCORE: 43

## 2025-01-02 NOTE — PROGRESS NOTES
[facility]  WO Nurse Inpatient Assessment     Consulted for: Midline incision, consult for vac placement    Patient History (according to provider note(s):      59yoM with h/o T2DM, HIV on ART, and recent admission from 12/17-12/19 for SBO managed successfully with bowel rest/GGC who presented to ED again on 12/20 for recurrent SBO s/p ex lap/SBR on 12/20. Now s/p ex-lap/SBR/re-anastomosis for anastomotic leak on 12/24. Extubated 12/26 AM.     Assessment:      Areas visualized during today's visit: Abdomen    Negative pressure wound therapy applied to: midline abdomen    Last photo: 1/2   Wound due to: Surgical Wound   Wound history/plan of care:    Surgical date: 12/24   Service following: Gen surgery   Date Negative Pressure Wound Therapy initiated: 12/30   Interventions in place: N/A  Is patient s nutritional status compromised? no   If yes, what interventions are in place? N/A  Reason for initiating vac therapy? Presence of co-morbidities, High risk of infections, and Need for accelerated granulation tissue  Which?of?the?following?co-morbidities?apply? Diabetes  If diabetic is patient on a diabetic management program? Yes   Is osteomyelitis present in wound? no   If yes what treatments are in place? N/A    Wound base: 100 % marbled Granulation tissue and Muscle, 1 exposed suture at 2 o'clock     Palpation of the wound bed: normal       Drainage: moderate      Volume in cannister: 100     Last cannister change date: 12/30 new canister      Description of drainage: serosanguinous      Measurements (length x width x depth, in cm) 19  x 3.5  x  2.5 cm       Tunneling N/A      Undermining up to 0.5 cm at 12 o'clock  Periwound skin: Intact       Color: normal and consistent with surrounding tissue       Temperature: normal    Odor: none   Pain: moderate, aching and tender   Pain intervention prior to dressing change: oral oxycodone, IV dilaudid, and slow and gentle cares  "  Treatment goal: Increase granulation and Protection  STATUS: granulating   Supplies ordered: gathered and at bedside    Number of foam pieces removed from a wound (excluding foam for bridge) :  1 GranuFoam Black and 1 Oil emulsion dressing   Verified this matched the number of foam pieces applied last dressing change: yes   Number of foam pieces packed into wound (excluding foam for bridge) :  1 GranuFoam Black and 1 Oil emulsion dressing       Treatment Plan:     Negative pressure wound therapy plan:  Wound location: midline abdomin    Change Days: Mon/Wed/Fri by WOC RN starting 1/6  Supplies (including all accessories) used: medium Black foam , Adaptic/Curity oil emulsion contact layer , and Cavilon no sting barrier film  Cleanse with Vashe prior to replacing NPWT  Suction setting: -125   Methods used: Window paned all periwound skin with vac drape prior to applying sponge    Staff RN to assess integrity of dressing and ensure suction is set at appropriate level every shift.   Date canister. Chart canister output every shift. Change cannister weekly and PRN if full/occluded     Remove foam dressing and replace with BID normal saline moist gauze dressing if:   -a dressing failure which cannot be repaired within 2 hours   -patient is discharging to home without a home pump   -patient is discharging to a facility outside the local area   -if a dressing is a \"Silver Foam\", remove before Radiation Therapy or MRI     The hospital VAC pump is not to be discharged with the patient. Please disconnect the patient from the machine prior to discharge.  If a home VAC pump has been delivered, connect the home cannister to dressing tubing and the cannister to the home pump, turn on home pump  If the patient is transferring to a nearby facility with a VAC, the tubing can be disconnected, clamp tubing and cover the end with a glove, then can be reconnected if within 2 hours  If transfer will be longer than 2 hours, dressing must " be removed and placed with a wet to moist gauze dressing for transfer       Orders: Reviewed    RECOMMEND PRIMARY TEAM ORDER: None, at this time  Education provided: plan of care  Discussed plan of care with: Patient and Nurse  [facility] nurse follow-up plan: Monday/WednesdayFriday  Notify WO if wound(s) deteriorate.  Nursing to notify the Provider(s) and re-consult the WO Nurse if new skin concern.    DATA:     Current support surface: Standard  Standard gel mattress (Isoflex)  Containment of urine/stool: Incontinence Protocol  BMI: Body mass index is 29.55 kg/m .   Active diet order: Orders Placed This Encounter      Regular Diet Adult     Output: I/O last 3 completed shifts:  In: 2046.75 [P.O.:680; I.V.:300]  Out: 2375 [Urine:2375]     Labs:   Recent Labs   Lab 01/02/25  0446   ALBUMIN 1.9*   PREALB 5.1*   HGB 8.2*   INR 1.30*   WBC 10.2     Pressure injury risk assessment:   Sensory Perception: 4-->no impairment  Moisture: 3-->occasionally moist  Activity: 3-->walks occasionally  Mobility: 3-->slightly limited  Nutrition: 2-->probably inadequate  Friction and Shear: 2-->potential problem  Estevan Score: 17    Prince Queen RN, CWOCN  Pager no longer in use, please contact through [facility] group: [facility] Nurse [place]    Dept. Office Number: [phone]

## 2025-01-02 NOTE — PROGRESS NOTES
Care Management Follow Up     Length of Stay (days): 12  Expected Discharge Date: 01/03/2025  Concerns to be Addressed: discharge planning     Patient plan of care discussed at interdisciplinary rounds: Yes  Anticipated Discharge Disposition: Transitional Care  Anticipated Discharge Services: None  Anticipated Discharge DME: None  Patient/family educated on Medicare website which has current facility and service quality ratings: yes  Education Provided on the Discharge Plan: Yes  Patient/Family in Agreement with the Plan: yes  Referrals Placed by CM/SW: Post Acute Facilities    Active Referrals:    [facility]erral (Liaison, Toyin Henson ([employer] teams-preferred method, [email or web address], [phone])   [facility] and [facility] (Ph: [phone], Admissions: [phone], F: [phone])    Inactive Referrals:    formerly [facility] Global Referral - Global denial d/t IV drug use & need for a PICC & TPN.  [facility] Global Referral - Global denial d/t the pt's TOMMY/CD treatment needs    Private pay costs discussed: Not applicable  Discussed  Partnership in Safe Discharge Planning  document with patient/family: Yes   Handoff Completed: No, handoff not indicated or clinically appropriate  Additional Information: PT gave dual disposition recs for TCU vs Home vs. CD Tx. Writer to assess whether the pt is interested in TCU. Pt appears too medically complex to return to his PTA CD treatment (TPN, wound vac, Ax1 with GB & walker). Writer met with pt at bedside to discuss dispo recs and medical/psychosocial needs. Pt in agreement to be placed in a TCU. Writer learned that this patient was globally declined from all [facility] & [place] facilities due to TOMMY and need for a PICC & TPN. Additional referrals placed to internal facilities and facilities open to working with patients with challenging psychosocial issues. Time did not allow follow-up with this pt today. Care management will check in with the pt tomorrow as  "time allows.     Next Steps: Care management team will follow for post-acute placement.     RADHA Kamara, JOMAR  Clinical , [facility]-7C  Desk Phone: [phone]   Schedule: Wednesday, Thursday, Fridays (for Mondays & Tuesdays, contact job share partner Sharon Nguyen)  Securely message with [employer] (Search Name or 7C Med Surg [phone] SW)  Text page via [facility] Paging/Directory   See signed in provider for up to date coverage information  Social Work & Care Management Department     Weekend And After Hours Care Management Contacts:  After Hours Social Work Coverage [phone] daily: Searchable in Vocera \"Adult SW After Hours On Call\"   Weekend Coverage [phone]: Searchable in Vocera \"7C Med Surg SW\" or \"7C Med Surg RNCC\"  "

## 2025-01-02 NOTE — PLAN OF CARE
Goal Outcome Evaluation:      Plan of Care Reviewed With: patient    Overall Patient Progress: no changeOverall Patient Progress: no change    Outcome Evaluation: 2766-3228. A&Ox4, int. forgetful. Vss on RA. Pt still rating abd pain 9-10/10 but pain seems to be better managed, less prn use and pt got more sleep overnight. Wound vac CDI at 125, abd binder in place over. Cycled TPN and lipids infusing. Ate a peanut butter sandwich. BG monitored w sliding scale, carbs covered w snack. Good UOP into urinal. 1 large inc watery BM. IVabx given. Bed alarm on.

## 2025-01-02 NOTE — PROGRESS NOTES
Nutrition Brief - starting 12 hour TPN regimen tonight, with plan to increase protein in TPN as well:     Chart reviewed. Labs noted.   -> Albumin: 1.9, has wound Vac with high protein demand.   -> B (H), would need tight glycemic control for wound healing support. Pharmacy is adding insulin to the PN bag.   -> noted elevated LFT's. PN dextrose decreased yesterday and started overnight cycle regimen.       Interventions:  Send PharmD change order for the following:    Dosing wt: 65 kg adjusted   Access: PICC     TPN goal:   PN volume: Start a 12 hour cycle PN regimen tonight 24 (1080 ml PN volume or per pharmacy/ team discretion,  Dextrose: Keep at 180 gram /day ( no change)  AA: Increase to 118 gram /day ( this is for wound healing support)  Lipids: keep at 250 ml 20% IV lipids 6x per week (no change)     Provision: 1513 kcals (23 kcals/kg + pending PO), 118 gram protein/day (1.8 g/kg protein), 180 gram carb/day (GIR 2.9 mg/kg/min peak infusion), and 30% fat kcals on average daily.     Changes were discussed with Endocrine!      Romy Chaves RD/LD  Unit 7C (4817-1427) and ([id number]),  Monday-Friday: Vocera -> (7C clinical Dietitian),   Weekend/Holiday: Vocera -> (Weekend Clinical Dietitian)

## 2025-01-02 NOTE — PROGRESS NOTES
"Emergency General Surgery Progress Note     Subjective  Pain better controlled. Passing flatus, BM and ambulating. Denies nausea and vomiting. He had more PO intake yesterday     Objective  /71   Pulse 103   Temp 97.7  F (36.5  C) (Oral)   Resp 18   Ht 1.626 m (5' 4\")   Wt 77.4 kg (170 lb 10.2 oz)   SpO2 94%   BMI 29.29 kg/m      General: Alert, NAD  CV: RRR  Resp: No increased work of breathing on RA  Abd: soft, tender to palpation, mildly distended,  no peritoneal signs, midline incision with wound vac in place and holding suction.    Ext: WWP    Recent Labs   Lab 01/02/25  0446 01/01/25  0655 12/31/24  0557   WBC 10.2 10.9 10.0   HGB 8.2* 9.1* 8.7*    306 237       Recent Labs   Lab 01/02/25  0755 01/02/25 0446 01/02/25  0145 01/01/25  0820 01/01/25  0655 12/31/24  0646 12/31/24  0557   NA  --  128*  --   --  129*  --  133*   POTASSIUM  --  4.4  --   --  4.3  --  3.6   CHLORIDE  --  96*  --   --  96*  --  102   CO2  --  23  --   --  23  --  23   BUN  --  17.7  --   --  15.7  --  17.1   CR  --  0.65*  --   --  0.64*  --  0.68   * 290* 225*   < > 229*   < > 132*   LINDA  --  7.6*  --   --  7.7*  --  7.4*   MAG  --  1.8  --   --  1.8  --  1.8   PHOS  --  3.2  --   --  2.9  --  2.7    < > = values in this interval not displayed.       No lab results found in last 7 days.      Recent Labs   Lab 01/02/25 0446 12/30/24  0405   INR 1.30* 1.30*       Recent Labs   Lab 01/02/25 0446 12/30/24  0405   AST 49* 57*   ALT 6 9   ALKPHOS 342* 117   BILITOTAL 0.3 0.7   ALBUMIN 1.9* 1.8*   INR 1.30* 1.30*      Imaging:  No new imaging       A/P: 59yoM with h/o T2DM, HIV on ART, and recent admission from 12/17-12/19 for SBO managed successfully with bowel rest/GGC who presented to ED again on 12/20 for recurrent SBO s/p ex lap/SBR on 12/20. Now s/p ex-lap/SBR/re-anastomosis for anastomotic leak on 12/24. Extubated 12/26 AM. Now with anterograde bowel function and working to improve oral intake.      POD# " 9 - He has improved PO intake. Hyponatremia likely due to hyperglycemia. Corrected sodium is 132. Will give salt tabs BID and recheck sodium. TCU referrals sent out, awaiting placement. Cefepime, micafungin and metronidazole to be completed today. Goals for today, encourage more PO intake and dispo planning.    - 1g NaCl tabs BID with repeat labs after  - Continue [facility]  - Continue regular diet   - Now only getting TPN overnight to help stimulate day time appetite. Discussed with nutrition about addition of oral supplements.  - Cefepime, micafungin and metronidazole to be completed today  - Continue Biktarvy  - Endocrine following for diabetes/insulin management. Appreciate recs   Insulin drip discontinued, insulin added to TPN, increase novolog meal   coverage, add Novolog Correction Scale.   - Continue with wound vac  - Replete electrolytes   - Dispo: TCU referrals sent out, still awaiting placement. Appreciate SW help.      Patient seen, examined and discussed with chief resident and discussed with staff surgeon.     Av Cormier MD  Urology, PGY-1 on EGS Service

## 2025-01-02 NOTE — PLAN OF CARE
Speech Language Therapy Discharge Summary    Reason for therapy discharge:    All goals and outcomes met, no further needs identified.    Progress towards therapy goal(s). See goals on Care Plan in [facility] electronic health record for goal details.  Goals met    Therapy recommendation(s):    No further therapy is recommended. From SLP perspective, pt OK for regular diet/thin liquids - defer to MD/RN for diet management. Ensure pt is upright and alert for all PO. No additional SLP services warranted.        [Negative] : Genitourinary

## 2025-01-02 NOTE — PROGRESS NOTES
Inpatient Diabetes Management Service : Progress Note     Patient: Andrew Collier   YOB: 1965    MRN: [id number]     Date of Consult : 01/02/2025   Date of Admission: 12/20/2024          Assessment and Recommendations:         Andrew Collier is a 59 year old with Type 2 Diabetes Mellitus. PMH includes HIV on ART,methamphetamine use,  and recent admission from 12/17-12/19 for SBO managed successfully with bowel rest/GGC who presented to ED again on 12/20/24 for recurrent SBO. Currently  s/p ex lap/SBR on 12/20 and then s/p ex-lap/SBR/re-anastomosis for anastomotic leak on 12/24/24. Extubated 12/26 AM. Inpatient Diabetes Service consulted for management to get patient off the insulin drip now that he is moved to the med surg floor.    Assessment:     Type 2 Diabetes Mellitus, complicated by peripheral neuropathy, ?gastroparesis. Poor control, A1c 10.5% (10/23/24)  Abdominal pain, n/v, ?gastroparesis   Methamphetamine use disorder  Food and housing insecurities   HIV  Recurrent SBO,  s/p ex lap/SBR on 12/20 and then s/p ex-lap/SBR/re-anastomosis for anastomotic leak on 12/24/24  TPN induced hyperglycemia  Stress induced hyperglycemia    Plan/Recommendations:      -Increase Regular insulin 29 units 0.116 X 180g of dextrose to TPN that is for 12 hours, starts in 8 pm today    -Increase Lantus 27 units q 24 hrs at 09:00 am.     - Novolog Meal Coverage: 1 units per 8 g CHO, TID AC and PRN with snacks/supplements     - Increase Novolog Correction Scale: custom resistance (ISF: 1/20)  TID AC / HS / 0200     - BG monitoring: TID AC, HS, 0200  - Hypoglycemia protocol    - Carb counting protocol     Discussion:    BG running high 201 -232.  Still not eating a lot but this morning had a piece of [place] toast and did not call for carb coverage.  VH=846.  Reminded him to notify RN when done eating.  Increase lantus 27 units daily and increase regular inuslin in TPN as will cycle over 12 hours  starting at 8 pm tonight.      Discharge as soon as find plaement with a TCU, notified by resident today, 1/2/2025    TENTATIVE Diabetes Management Discharge Plan  Instructions to patient will be  posted in AVS and discussed on day of discharge.   Medications and supplies are to be ordered by primary service on discharge.   *please use the DIAB non-branded discharge supply order set ([id number])*    Patient will need the following supplies prescribed:   Nothing since going to TCU     Blood glucose monitoring:   Three times daily before meals, and at bedtime.    Blood Glucose (BG) goals:  mg/dL before meals, less than 180 mg/dL 2 hrs after meals.     Glucose Control Regimen:      2) Long-acting insulin: glargine (Lantus) - take  units once daily at . Take at same time each day.     3) Rapid-acting insulin: aspart (Novolog) carbohydrate coverage/mealtime insulin - take 1 unit per  grams of carbohydrates before meals and snacks.  Take  units before average meals that contain carbohydrates  Take  units before small meals or snacks that contain carbohydrates    4) Rapid-acting insulin: aspart (Novolog) correction - see chart below. Take for high blood glucoses three times daily before meals and at bedtime.   You may add the correction dose to the carbohydrate coverage/mealtime insulin dose and give in one injection--ideally 10-15 minutes before a meal.  You may take the correction dose even if you skip a meal (as long as it has been 4 hours since previous correction dose).       Pre-meal correction scale:    Blood Glucose Insulin Novolog Before Meals:   Less than 140 0 units   140 -164  1 units   165 -189 2 units   190 - 214 3 units   215 - 239 4 units    240 - 264  5 units   265 - 289 6 units   290 - 314 7 units   315 - 339 8 units   340 - 364 9 units   365 or more 10 units       Bedtime correction scale:     Blood Glucose Insulin Novolog At Bedtime:   Less than 200 0 units   200 - 224 1 units   225 - 249 2 units    250 - 274 3 units   275 - 299  4 units    300 - 324  5 units   325 - 349 6 units   350 or more 7 units           Outpatient Follow-Up: An appointment request was sent to the [facility] coordinator to schedule your outpatient diabetes appointment 1-2 weeks from discharge from the TCU.     If you have urgent questions or concerns regarding your blood sugars or insulin, you may contact [phone] (the main hospital ). Ask to speak with the Endocrinologist on call.        Thank you for letting the Diabetes Management Team be involved in your care!      Discharge Planning: (tentative)    Medications: TBD    Test Claims: TBD, already on insulin.   Education:  Needs to be assessed closer to discharge.  Already knows how to get insulin  Outpatient Follow-up:  recommend [facility] Endocr[facility]ology vs PCP        Interval History/Review of Systems :   The last 24 hours progress and nursing notes reviewed.  Patient says no nauea or emesis or diarrhea.  Still with some abdominal pain. Low appetite still   creat=0.7  Planned Procedures/Surgeries: none                 Recent Labs   Lab 01/02/25  0337 01/02/25  0336 01/01/25  2130 01/01/25  1723 01/01/25  1325 01/01/25  1003   * 247* 294* 235* 225* 141*               Inpatient Glucose Trends:               Diabetes History:   Diabetes Type and Duration:  T2DM since ~2017 (2015 per A1c 6.7% 10/2015)   GAD65 antibody, zinc transporter 8 antibody, islet antibody, insulin antibody, and c-peptide  not available on epic search     PTA Medication Regimen: Lantus 27 units bid and novolog 10 units per meal- pharmacist says he was taking lantus 27 units daily and Novlog 10 units with meals.  Last time he was discharged he was discharged on lantus 25 units daily and 10 units of Novolog with meal      Historical Diabetes Medications:    Glipizide ER 10 mg daily --> discontinued 7/25/24, patient not sure why this was discontinued and notes aren't clear.  -  "Januvia ? - patient doesn't remember taking this, was using last yr per chart review  - Hx mild allergy to GLP-1 (trulicity) rash on chest/arms, legs --> also has hx of gastroparesis and bowel obstruction.   - Metformin, discontinued 2/2 history of side effects of diarrhea   - Jardiance: may have caused wounds on leg (unclear hx) per chart review    Glucose monitoring device/frequency/trends: not using the dexcom, not checking frequently       Outpatient Diabetes Provider:  no-showed [facility] endocrinology visit 10/10/2023 following hospital admission 9/2023. Patient states he doesn't have a PCP. Established care with internal med MD (Anna Dailey) 2/2024. Follows with pharmacist (Dorie Holland) for Medication Therapy Management, last saw Skyler, 7/25/24.  Has mostly seen his IDS provider since.  Has been in and out of the ER/hospital frequently and has not seen anyone since for his diabetes.              Medications Impacting Glycemia:    Steroids: with surgery, received dex 8 mg x1 and then none since.  No plan to give steroids  D5W containing solutions/medications: TPN  Other medications impacting glucose: None         Diet/Nutrition:    Orders Placed This Encounter      Regular Diet Adult     Supplements:  none  TPN: started on 12/25/2024: has reached goal on TPN, has 260g of dextrose 1/2/2025 cycle to 12 hours today, same dextrose 180g            Physical Exam:    /80 (BP Location: Left arm)   Pulse 103   Temp 98.3  F (36.8  C) (Oral)   Resp 18   Ht 1.626 m (5' 4\")   Wt 78.1 kg (172 lb 2.9 oz)   SpO2 94%   BMI 29.55 kg/m       General:   NAD, laying in bed.  Lungs: unlabored breathing on RA.  ABD:  rounded,  Has large wound on abdomen with wound vac  Skin:  warm and dry, no obvious lesions  MSK:   moving all extremities  Lymp:   no LE edema  Feet: warm and dry. no open sores/wounds.    Mental Status:  Alert and oriented x3  Psych:   Cooperative, good eye contact, full/appropriate affect "            Laboratory Data:      Lab Results   Component Value Date    A1C 10.5 (H) 10/23/2024    A1C 11.7 (H) 07/13/2024    A1C 13.4 (H) 02/06/2024    A1C 10.7 (H) 11/09/2023    A1C 13.3 (H) 08/06/2023    A1C 9.7 (H) 11/11/2020    A1C 8.2 (H) 09/25/2020    A1C 8.2 (H) 08/27/2020    A1C 11.6 (H) 02/11/2020    A1C 12.3 (H) 01/07/2020     ROUTINE IP LABS (Last four results)  BMP  Recent Labs   Lab 01/02/25  0755 01/02/25  0446 01/02/25  0145 01/01/25  2132 01/01/25  0820 01/01/25  0655 12/31/24  0646 12/31/24  0557 12/30/24  0406 12/30/24  0405   NA  --  128*  --   --   --  129*  --  133*  --  136   POTASSIUM  --  4.4  --   --   --  4.3  --  3.6  --  3.9   CHLORIDE  --  96*  --   --   --  96*  --  102  --  105   LINDA  --  7.6*  --   --   --  7.7*  --  7.4*  --  7.2*   CO2  --  23  --   --   --  23  --  23  --  23   BUN  --  17.7  --   --   --  15.7  --  17.1  --  17.3   CR  --  0.65*  --   --   --  0.64*  --  0.68  --  0.68   * 290* 225* 208*   < > 229*   < > 132*   < > 138*    < > = values in this interval not displayed.     CBC  Recent Labs   Lab 01/02/25  0446 01/01/25  0655 12/31/24  0557 12/30/24  0405   WBC 10.2 10.9 10.0 9.9   RBC 2.91* 3.27* 3.07* 3.16*   HGB 8.2* 9.1* 8.7* 8.7*   HCT 24.6* 27.5* 26.2* 27.4*   MCV 85 84 85 87   MCH 28.2 27.8 28.3 27.5   MCHC 33.3 33.1 33.2 31.8   RDW 14.3 14.4 14.5 14.2    306 237 193     INR  Recent Labs   Lab 01/02/25  0446 12/30/24  0405   INR 1.30* 1.30*     Lab Results   Component Value Date    AST 49 01/02/2025    AST 12 06/24/2021     Lab Results   Component Value Date    ALT 6 01/02/2025    ALT 20 06/24/2021       To contact Inpatient Diabetes Service:     7 AM - 5 PM: Page the IDS KASSY following the patient that day (see filed or incomplete progress notes/consult notes under Endocrinology)    OR if uncertain of provider assignment: page job code [phone]    5 PM - 7 AM: First call after hours is to primary service.    For urgent after-hours questions, page job  code for on call fellow: [phone]       Kate Quintero PA-C  Inpatient Diabetes Service    Available by AWID  Date of Service:  1/2/2025    I spent a total of 45 minutes on the date of the encounter doing prep/post-work, chart review, history and exam, documentation and further activities per the note including lab review, multidisciplinary communication, counseling the patient and/or coordinating care regarding acute hyper/hypoglycemic management, as well as discharge management and planning/communication.  See note for details.

## 2025-01-02 NOTE — PROGRESS NOTES
Calorie Count  Intake recorded for: 1/1  Total Kcals: 418 Total Protein: 13g  Kcals from Hospital Food: 418   Protein: 13g  Kcals from Outside Food (average):0 Protein: 0g  # Meals Ordered from Kitchen: 1  # Meals Recorded: 1 meal recorded   Meal 1: 100% ice cream, sour cream, diet Pepsi; 50% cheese quesadilla  # Supplements Recorded: 0

## 2025-01-02 NOTE — PLAN OF CARE
Patient A & O x 4 , forgetful at time .Irritable easily  no respiratory distress noted . Patient rate 8-9/10 abdominal pain  Patient received Dilaudid IV PRN, Oxycodone 10 mg po RN .  WOC changed wound VAC today and intact. .TPN to cycle . Patient with poor appetite report no appetite . Breakfast and Lunch ordered for him and did not eat . Void adequate in urinal and one time inc of large urine. Inct care done . Call light within reach, bed alarm on. Patient able to make needs known.   Continue with POC care and update MD with change      Goal Outcome Evaluation:      Plan of Care Reviewed With: patient    Overall Patient Progress: no change.

## 2025-01-02 NOTE — PLAN OF CARE
"Blood pressure 136/79, pulse 97, temperature 98.4  F (36.9  C), temperature source Oral, resp. rate 20, height 1.626 m (5' 4\"), weight 78.1 kg (172 lb 2.9 oz), SpO2 95%. Via RA .    Patient A & O x 4 , forgetful at time .Irritable easily  VSS no respiratory distress noted . Patient rate 9/10 abdominal pain MD  aware Oxycodone order change to 5- 10 mg po PRN . Patient received Dilaudid IV PRN, Oxycodone 10 mg po RN . C/o abdominal cramping given Simethicone X 1 PRN  Wound VAC intact . Continue on TPN at 45 ml/hr , Patient ate  breakfast  50 % and tolerated okay did not wants to eat Lunch. Patient on karina count . , 232, 221 insulin given per range.  Void adequate in urinal . Activity Patient refused sitting  in chair and ambulating in room or hallway.  Call light within reach, bed alarm on . Patient able to make  needs known.  Continue monitor closely and update MD with change.      Goal Outcome Evaluation:    Plan of Care Reviewed With: patient    Overall Patient Progress: no change       "

## 2025-01-03 ENCOUNTER — APPOINTMENT (OUTPATIENT)
Dept: PHYSICAL THERAPY | Facility: CLINIC | Age: 60
End: 2025-01-03
Payer: COMMERCIAL

## 2025-01-03 LAB
ANION GAP SERPL CALCULATED.3IONS-SCNC: 8 MMOL/L (ref 7–15)
BUN SERPL-MCNC: 19.8 MG/DL (ref 8–23)
CALCIUM SERPL-MCNC: 8.2 MG/DL (ref 8.8–10.4)
CHLORIDE SERPL-SCNC: 98 MMOL/L (ref 98–107)
CREAT SERPL-MCNC: 0.64 MG/DL (ref 0.67–1.17)
EGFRCR SERPLBLD CKD-EPI 2021: >90 ML/MIN/1.73M2
ERYTHROCYTE [DISTWIDTH] IN BLOOD BY AUTOMATED COUNT: 14 % (ref 10–15)
GLUCOSE BLDC GLUCOMTR-MCNC: 155 MG/DL (ref 70–99)
GLUCOSE BLDC GLUCOMTR-MCNC: 234 MG/DL (ref 70–99)
GLUCOSE BLDC GLUCOMTR-MCNC: 290 MG/DL (ref 70–99)
GLUCOSE BLDC GLUCOMTR-MCNC: 380 MG/DL (ref 70–99)
GLUCOSE SERPL-MCNC: 301 MG/DL (ref 70–99)
HCO3 SERPL-SCNC: 24 MMOL/L (ref 22–29)
HCT VFR BLD AUTO: 24.7 % (ref 40–53)
HGB BLD-MCNC: 8 G/DL (ref 13.3–17.7)
MAGNESIUM SERPL-MCNC: 1.9 MG/DL (ref 1.7–2.3)
MCH RBC QN AUTO: 27.8 PG (ref 26.5–33)
MCHC RBC AUTO-ENTMCNC: 32.4 G/DL (ref 31.5–36.5)
MCV RBC AUTO: 86 FL (ref 78–100)
PHOSPHATE SERPL-MCNC: 3.3 MG/DL (ref 2.5–4.5)
PLATELET # BLD AUTO: 422 10E3/UL (ref 150–450)
POTASSIUM SERPL-SCNC: 4.5 MMOL/L (ref 3.4–5.3)
RBC # BLD AUTO: 2.88 10E6/UL (ref 4.4–5.9)
SODIUM SERPL-SCNC: 128 MMOL/L (ref 135–145)
SODIUM SERPL-SCNC: 130 MMOL/L (ref 135–145)
SODIUM SERPL-SCNC: 130 MMOL/L (ref 135–145)
WBC # BLD AUTO: 10.3 10E3/UL (ref 4–11)

## 2025-01-03 PROCEDURE — 250N000012 HC RX MED GY IP 250 OP 636 PS 637: Performed by: PHYSICIAN ASSISTANT

## 2025-01-03 PROCEDURE — 250N000009 HC RX 250: Performed by: NURSE PRACTITIONER

## 2025-01-03 PROCEDURE — 120N000002 HC R&B MED SURG/OB UMMC

## 2025-01-03 PROCEDURE — 250N000011 HC RX IP 250 OP 636: Performed by: STUDENT IN AN ORGANIZED HEALTH CARE EDUCATION/TRAINING PROGRAM

## 2025-01-03 PROCEDURE — 84100 ASSAY OF PHOSPHORUS: CPT | Performed by: SURGERY

## 2025-01-03 PROCEDURE — 36592 COLLECT BLOOD FROM PICC: CPT

## 2025-01-03 PROCEDURE — 80048 BASIC METABOLIC PNL TOTAL CA: CPT

## 2025-01-03 PROCEDURE — 83735 ASSAY OF MAGNESIUM: CPT | Performed by: SURGERY

## 2025-01-03 PROCEDURE — 250N000013 HC RX MED GY IP 250 OP 250 PS 637

## 2025-01-03 PROCEDURE — 250N000013 HC RX MED GY IP 250 OP 250 PS 637: Performed by: SURGERY

## 2025-01-03 PROCEDURE — 84295 ASSAY OF SERUM SODIUM: CPT

## 2025-01-03 PROCEDURE — 250N000013 HC RX MED GY IP 250 OP 250 PS 637: Performed by: STUDENT IN AN ORGANIZED HEALTH CARE EDUCATION/TRAINING PROGRAM

## 2025-01-03 PROCEDURE — 250N000011 HC RX IP 250 OP 636

## 2025-01-03 PROCEDURE — 250N000013 HC RX MED GY IP 250 OP 250 PS 637: Performed by: NURSE PRACTITIONER

## 2025-01-03 PROCEDURE — 85014 HEMATOCRIT: CPT

## 2025-01-03 PROCEDURE — 99232 SBSQ HOSP IP/OBS MODERATE 35: CPT | Performed by: PHYSICIAN ASSISTANT

## 2025-01-03 PROCEDURE — 97530 THERAPEUTIC ACTIVITIES: CPT | Mod: GP

## 2025-01-03 RX ORDER — SENNOSIDES 8.6 MG
2 TABLET ORAL 2 TIMES DAILY
Status: DISCONTINUED | OUTPATIENT
Start: 2025-01-03 | End: 2025-01-20 | Stop reason: HOSPADM

## 2025-01-03 RX ORDER — POLYETHYLENE GLYCOL 3350 17 G/17G
17 POWDER, FOR SOLUTION ORAL DAILY
Status: DISCONTINUED | OUTPATIENT
Start: 2025-01-03 | End: 2025-01-09

## 2025-01-03 RX ORDER — MAGNESIUM SULFATE HEPTAHYDRATE 40 MG/ML
2 INJECTION, SOLUTION INTRAVENOUS ONCE
Status: COMPLETED | OUTPATIENT
Start: 2025-01-03 | End: 2025-01-03

## 2025-01-03 RX ORDER — IBUPROFEN 600 MG/1
600 TABLET, FILM COATED ORAL EVERY 6 HOURS PRN
Status: DISCONTINUED | OUTPATIENT
Start: 2025-01-03 | End: 2025-01-20 | Stop reason: HOSPADM

## 2025-01-03 RX ADMIN — OXYCODONE HYDROCHLORIDE 5 MG: 5 TABLET ORAL at 11:38

## 2025-01-03 RX ADMIN — ENOXAPARIN SODIUM 40 MG: 40 INJECTION SUBCUTANEOUS at 21:15

## 2025-01-03 RX ADMIN — SENNOSIDES 2 TABLET: 8.6 TABLET, FILM COATED ORAL at 11:39

## 2025-01-03 RX ADMIN — BICTEGRAVIR SODIUM, EMTRICITABINE, AND TENOFOVIR ALAFENAMIDE FUMARATE 1 TABLET: 50; 200; 25 TABLET ORAL at 16:00

## 2025-01-03 RX ADMIN — ACETAMINOPHEN 975 MG: 325 TABLET, FILM COATED ORAL at 05:52

## 2025-01-03 RX ADMIN — INSULIN ASPART 13 UNITS: 100 INJECTION, SOLUTION INTRAVENOUS; SUBCUTANEOUS at 09:23

## 2025-01-03 RX ADMIN — INSULIN ASPART 1 UNITS: 100 INJECTION, SOLUTION INTRAVENOUS; SUBCUTANEOUS at 18:37

## 2025-01-03 RX ADMIN — OXYCODONE HYDROCHLORIDE 5 MG: 5 TABLET ORAL at 18:37

## 2025-01-03 RX ADMIN — MAGNESIUM SULFATE HEPTAHYDRATE 2 G: 2 INJECTION, SOLUTION INTRAVENOUS at 11:39

## 2025-01-03 RX ADMIN — OXYCODONE HYDROCHLORIDE 10 MG: 10 TABLET ORAL at 05:57

## 2025-01-03 RX ADMIN — SIMETHICONE CHEW TAB 80 MG 80 MG: 80 TABLET ORAL at 09:23

## 2025-01-03 RX ADMIN — Medication 5 ML: at 05:35

## 2025-01-03 RX ADMIN — ACETAMINOPHEN 975 MG: 325 TABLET, FILM COATED ORAL at 21:16

## 2025-01-03 RX ADMIN — PANTOPRAZOLE SODIUM 40 MG: 40 INJECTION, POWDER, FOR SOLUTION INTRAVENOUS at 09:25

## 2025-01-03 RX ADMIN — Medication 5 ML: at 16:26

## 2025-01-03 RX ADMIN — ACETAMINOPHEN 975 MG: 325 TABLET, FILM COATED ORAL at 16:00

## 2025-01-03 RX ADMIN — HYDROMORPHONE HYDROCHLORIDE 0.4 MG: 0.2 INJECTION, SOLUTION INTRAMUSCULAR; INTRAVENOUS; SUBCUTANEOUS at 01:25

## 2025-01-03 RX ADMIN — Medication 5 MG: at 18:37

## 2025-01-03 RX ADMIN — QUETIAPINE FUMARATE 50 MG: 50 TABLET ORAL at 21:16

## 2025-01-03 RX ADMIN — LIDOCAINE 4% 1 PATCH: 40 PATCH TOPICAL at 21:17

## 2025-01-03 RX ADMIN — POLYETHYLENE GLYCOL 3350 17 G: 17 POWDER, FOR SOLUTION ORAL at 11:38

## 2025-01-03 RX ADMIN — INSULIN ASPART 5 UNITS: 100 INJECTION, SOLUTION INTRAVENOUS; SUBCUTANEOUS at 11:46

## 2025-01-03 RX ADMIN — SENNOSIDES 2 TABLET: 8.6 TABLET, FILM COATED ORAL at 21:16

## 2025-01-03 ASSESSMENT — ACTIVITIES OF DAILY LIVING (ADL)
ADLS_ACUITY_SCORE: 41
ADLS_ACUITY_SCORE: 46
ADLS_ACUITY_SCORE: 41
ADLS_ACUITY_SCORE: 46
ADLS_ACUITY_SCORE: 41

## 2025-01-03 NOTE — PROGRESS NOTES
Calorie Count  Intake recorded for: 1/2  Total Kcals: 172 Total Protein: 5g  Kcals from Hospital Food: 172  Protein: 5g  Kcals from Outside Food (average):0 Protein: 0g  # Meals Ordered from Kitchen: 3 meals   # Meals Recorded: 1 meal - 50% Pepsi, 25% cheese quesadilla w/ hot sauce & sour cream  # Supplements Recorded: 0

## 2025-01-03 NOTE — PROGRESS NOTES
Care Management Follow Up    Length of Stay (days): 14    Expected Discharge Date: 01/07/2025     Concerns to be Addressed: discharge planning     Patient plan of care discussed at interdisciplinary rounds: Yes    Anticipated Discharge Disposition: Transitional Care  Anticipated Discharge Services: None  Anticipated Discharge DME: None    Patient/family educated on Medicare website which has current facility and service quality ratings: yes  Education Provided on the Discharge Plan: Yes  Patient/Family in Agreement with the Plan: yes    Referrals Placed by CM/SW: Post Acute Facilities  Private pay costs discussed: Not applicable    Discussed  Partnership in Safe Discharge Planning  document with patient/family: Yes    Additional Information:  CHW tasked to follow up on pending referrals and send LTACH referrals.     Active Referrals:  [facility]  [address] [place] [id number]  Phone: [phone]  Fax: [phone]  01/03: initial referral sent     [address] [address]  Phone: [phone]  Fax: [phone]  01/03: initial referral sent     Inactive Referrals:  [facility] Global Referral - Global denial d/t IV drug use & need for a PICC & TPN.  [facility] Referral - Global denial d/t the pt's TOMMY/CD treatment needs  [facility]- cannot meet patient's needs   [facility] Referral- cannot meet patient's needs, history of violence and/or drug use or alcohol abuse     Fabienne Melchor  Inpatient CHW  [facility] [address]  [phone]

## 2025-01-03 NOTE — PROGRESS NOTES
"Care Management Follow Up    Length of Stay (days): 14  Expected Discharge Date: 01/07/2025  Concerns to be Addressed: discharge planning     Patient plan of care discussed at interdisciplinary rounds: Yes  Anticipated Discharge Disposition: Transitional Care  Anticipated Discharge Services: None  Anticipated Discharge DME: None  Patient/family educated on Medicare website which has current facility and service quality ratings: yes  Education Provided on the Discharge Plan: Yes  Patient/Family in Agreement with the Plan: yes  Referrals Placed by CM/SW: Post Acute Facilities  Private pay costs discussed: Not applicable  Discussed  Partnership in Safe Discharge Planning  document with patient/family: Yes   Handoff Completed: No, handoff not indicated or clinically appropriate  Additional Information: Writer learned that the pt's TPN will be discontinued today, making the pt less medically complex. Prior to learning this, writer had been exploring referring the pt to LTACH, but with this decreased acuity, it does not appear that the pt would qualify. Writer delegated task of TCU referral follow-up to [facility] Community Health Worker Fabienne TATE, MPH. Please see her note for referral updates. Plan for all TCU options to be exhausted. Ongoing barriers are medical and psychosocial acuity.    Next Steps: Care management team will continue to follow this pt for med readiness & TCU placement.    RADHA Kamara, JOMAR  Clinical , [facility]  Desk Phone: [phone]   Schedule: Wednesday, Thursday, Fridays (for Mondays & Tuesdays, contact job share partner Sharon Nguyen)  Securely message with E[employer]ily[employer] (Search Name or 7C Med Surg [phone] SW)  Text page via [facility] Paging/Directory   See signed in provider for up to date coverage information  Social Work & Care Management Department    Weekend And After Hours Care Management Contacts:  After Hours Social Work Coverage [phone] daily: Searchable in Easily[employer] \"Adult SW " "After Hours On Call\"   Weekend Coverage [phone]: Searchable in Hello Agent \"7C Med Surg SW\" or \"7C Med Surg RNCC\"     "

## 2025-01-03 NOTE — PROGRESS NOTES
"Emergency General Surgery Progress Note     Subjective  Had more PO intake and some Boost supplement. Denies nausea and vomiting. Passing flatus and having BM.     Objective  BP (!) 140/76 (BP Location: Left arm)   Pulse 108   Temp 99.7  F (37.6  C) (Oral)   Resp 24   Ht 1.626 m (5' 4\")   Wt 77.4 kg (170 lb 10.2 oz)   SpO2 94%   BMI 29.29 kg/m      General: Alert, NAD  CV: RRR  Resp: No increased work of breathing on RA  Abd: soft, tender to palpation along wound vac, distension improved,  no peritoneal signs, midline incision with wound vac in place and holding suction.    Ext: WWP    Recent Labs   Lab 01/03/25  0539 01/02/25  0446 01/01/25  0655   WBC 10.3 10.2 10.9   HGB 8.0* 8.2* 9.1*    340 306       Recent Labs   Lab 01/03/25  0801 01/03/25  0539 01/03/25  0140 01/03/25  0042 01/02/25  2145 01/02/25  1845 01/02/25  0755 01/02/25  0446 01/01/25  0820 01/01/25  0655   NA  --  130*  130*  --  128*  --  127*   < > 128*  --  129*   POTASSIUM  --  4.5  --   --   --   --   --  4.4  --  4.3   CHLORIDE  --  98  --   --   --   --   --  96*  --  96*   CO2  --  24  --   --   --   --   --  23  --  23   BUN  --  19.8  --   --   --   --   --  17.7  --  15.7   CR  --  0.64*  --   --   --   --   --  0.65*  --  0.64*   * 301* 290*  --    < >  --    < > 290*   < > 229*   LINDA  --  8.2*  --   --   --   --   --  7.6*  --  7.7*   MAG  --  1.9  --   --   --   --   --  1.8  --  1.8   PHOS  --  3.3  --   --   --   --   --  3.2  --  2.9    < > = values in this interval not displayed.       No lab results found in last 7 days.      Recent Labs   Lab 01/02/25  0446 12/30/24  0405   INR 1.30* 1.30*       Recent Labs   Lab 01/02/25  0446 12/30/24  0405   AST 49* 57*   ALT 6 9   ALKPHOS 342* 117   BILITOTAL 0.3 0.7   ALBUMIN 1.9* 1.8*   INR 1.30* 1.30*      Imaging:  No new imaging       A/P: 59yoM with h/o T2DM, HIV on ART, and recent admission from 12/17-12/19 for SBO managed successfully with bowel rest/GGC who " presented to ED again on 12/20 for recurrent SBO s/p ex lap/SBR on 12/20. Now s/p ex-lap/SBR/re-anastomosis for anastomotic leak on 12/24. Extubated 12/26 AM. Now with anterograde bowel function and working to improve oral intake.      POD# 10 - He has improved PO intake. Hyponatremia in setting of hyperglycemia, corrected sodium is 137. TCU referrals sent out, awaiting placement. Goals for today, encourage more PO intake and continued dispo planning.    - Continue MMPC  - Continue regular diet and Boost supplements  - TPN will to be completed today  - Continue Biktarvy  - Endocrine following for diabetes/insulin management. Appreciate recs  - Continue with wound vac  - Replete electrolytes   - Dispo: TCU referrals sent out, still awaiting placement. Appreciate SW help.      Patient seen, examined and discussed with chief resident and discussed with staff surgeon.     Av Cormier MD  Urology, PGY-1 on EGS Service

## 2025-01-03 NOTE — DISCHARGE SUMMARY
*** Update date of service and refresh document before signing    Formerly [facility]  Discharge Summary  General Surgery     Andrew Collier MRN# [id number]   YOB: 1965 Age: 59 year old     Date of Admission:  12/20/2024  Date of Discharge::  January 3, 2025***  Admitting Physician:  Iris Garza MD  Discharge Physician:  ***  Primary Care Physician:        No Ref-Primary, Physician          Admission Diagnoses:   Small bowel obstruction (H) [K56.609]  Generalized abdominal pain [R10.84]  Nausea and vomiting, unspecified vomiting type [R11.2]  ***          Discharge Diagnosis:   There are no discharge diagnoses documented for the most recent discharge.   ***         Procedures:   ***  Procedure(s):  EXPLORATORY LAPAROTOMY, SMALL BOWEL RESECTION AND REANASTOMOSIS          Consultations:   NURSING TO CONSULT FOR VASCULAR ACCESS CARE IP CONSULT  SURGERY GENERAL ADULT IP CONSULT  SURGERY GENERAL ADULT IP CONSULT  CARE MANAGEMENT / SOCIAL WORK IP CONSULT  PHYSICAL THERAPY ADULT IP CONSULT  OCCUPATIONAL THERAPY ADULT IP CONSULT  CARE MANAGEMENT / SOCIAL WORK IP CONSULT  INTERNAL MEDICINE ADULT IP CONSULT FOR EAST BANK  PHARMACY IP CONSULT  NURSING TO CONSULT FOR VASCULAR ACCESS CARE IP CONSULT  NURSING TO CONSULT FOR VASCULAR ACCESS CARE IP CONSULT  NURSING TO CONSULT FOR VASCULAR ACCESS CARE IP CONSULT  NURSING TO CONSULT FOR VASCULAR ACCESS CARE IP CONSULT  NURSING TO CONSULT FOR VASCULAR ACCESS CARE IP CONSULT  PHARMACY/NUTRITION TO START AND MANAGE TPN  PHARMACY IP CONSULT  INFECTIOUS DISEASE GENERAL ADULT IP CONSULT  NURSING TO CONSULT FOR VASCULAR ACCESS CARE IP CONSULT  NURSING TO CONSULT FOR VASCULAR ACCESS CARE IP CONSULT  VASCULAR ACCESS FOR PICC PLACEMENT ADULT IP CONSULT  NURSING TO CONSULT FOR VASCULAR ACCESS CARE IP CONSULT  PHARMACY IP CONSULT  SPEECH LANGUAGE PATH ADULT IP CONSULT  WOUND OSTOMY CONTINENCE NURSE  IP CONSULT  ENDOCRINE DIABETES ADULT IP  CONSULT  PHARMACY IP CONSULT  PHARMACY IP CONSULT  PHARMACY IP CONSULT  PHARMACY IP CONSULT  PHARMACY IP CONSULT          Brief History of Illness:   59 year old male with history of T2DM, HIV on ART, methamphetamine use and recent admission from 12/17-12/19 for SBO managed successfully with bowel rest and gastrografin challenge who presented to ED again on 12/20 for recurrent SBO s/p ex lap/SBR on 12/20. Now s/p ex-lap, SBR and re-anastomosis for anastomotic leak on 12/24.           Hospital Course:   ***    The patient underwent s/p ex lap/SBR on 12/20. Hospital course was complicated by anastomotic leak requiring ex-lap, SBR and re-anastomosis for anastomotic leak on 12/24. Post-op course  *** which he tolerated well without immediate complications. He was transferred to the PACU and then floor for routine postoperative care. The remainder of his postoperative course was unremarkable. Potts was removed on POD#***. He had return of bowel function on POD#*** and his diet was slowly advanced. On the day of discharge, he was tolerating ***regular diet, his pain was well controlled with oral pain medications, he was voiding spontaneously, and ambulating independently. Patient with follow up with *** in *** clinic in *** weeks.           Imaging Studies:     Results for orders placed or performed during the hospital encounter of 12/20/24   Abdomen XR, 2 vw, flat and upright    Narrative    EXAMINATION: XR ABDOMEN 2 VIEWS  12/20/2024 1:56 PM      CLINICAL HISTORY: luq pain hx of recurrent sbo eval and also for free  air etc    COMPARISON: 12/18/2024    FINDINGS:    Supine and upright frontal x-ray of the abdomen.Nonspecific bowel gas  pattern. Relative paucity of bowel gas. No definite pneumoperitoneum.  Mild to moderate colonic stool burden. Focal metallic opacity projects  over the right lower quadrant similar to prior radiograph from  12/18/2024, indeterminate, possibly ingested.      Impression    IMPRESSION:   1.  Nonspecific bowel gas pattern. Relative paucity of bowel gas. No  definite pneumoperitoneum. Mild to moderate colonic stool burden.  2. Focal metallic opacity projects over the right lower quadrant  similar to prior radiograph from 12/18/2024, indeterminate, possibly  ingested/postprocedure. Consider correlation.    I have personally reviewed the examination and initial interpretation  and I agree with the findings.    LAURYN FOURNIER MD         SYSTEM ID:  [id number]   CT Abdomen Pelvis w/o Contrast     Value    Radiologist flags mild apparent thickening of distal esophagus near    Narrative    EXAMINATION: CT ABDOMEN PELVIS W/O CONTRAST, 12/20/2024 5:10 PM    INDICATION: eval for recurrent sbo    COMPARISON STUDY: 10/9/2024    TECHNIQUE: CT scan of the abdomen and pelvis was performed on  multidetector CT scanner using volumetric acquisition technique and  images were reconstructed in multiple planes with variable thickness  and reviewed on dedicated workstations.     CONTRAST: None   CT scan radiation dose is optimized to minimum requisite dose using  automated dose modulation techniques.    FINDINGS:    Lower thorax: Mild apparent thickening of the esophagus    Abdomen and pelvis:    Limited evaluation of abdominal parenchymal organs due to noncontrast  technique.    Liver: No mass. No intrahepatic biliary ductal dilation.    Biliary System: Cholelithiasis without findings of cholecystitis.    Pancreas: No mass or pancreatic ductal dilation.    Adrenal glands: No mass or nodules    Spleen: Not enlarged    Kidneys: No suspicious mass, obstructing calculus or hydronephrosis.    Gastrointestinal tract : Distention of the proximal stomach. Dilated  loop of small bowel measuring up to 4.7 cm in the central abdomen  compatible with bowel obstruction; with 2 apparent transition points  (4/26 and 4/35). Decompressed distal small bowel. Mild colonic stool  burden. No pneumatosis, mesenteric venous gas or  pneumoperitoneum.  Post appendectomy.    Mesentery/peritoneum/retroperitoneum: No mass. No free fluid or air.    Lymph nodes: No new bulky adenopathy. Similar few scattered bilateral  inguinal lymph nodes, few subcentimeter para-aortic and pelvic lymph  nodes.    Vasculature: No abdominal aortic aneurysm.    Pelvis: Urinary bladder is partially distended.  Penile pump stable.   Prostatomegaly measuring 5.6 cm in transverse dimension.    Osseous structures: No aggressive or acute osseous lesion.      Soft tissues:Unchanged nodular density in the anterior right lower  quadrant subcutaneous soft tissue (3/140).      Impression    IMPRESSION:       1. Dilated loop of small bowel measuring up to 4.7 cm in the central  abdomen compatible with bowel obstruction; with 2 apparent transition  points, developing closed-loop obstruction not entirely excluded. No  pneumatosis, mesenteric venous gas or portal venous gas.     2. Additional findings similar to prior CT from 12/17/2024, accounting  for difference in technique; as detailed above with mild apparent  thickening of distal esophagus near the gastroesophageal junction,  underdistention versus esophagitis/esophageal pathology; consider  attention on follow-up.    Concern for close loop configuration of obstruction was communicated  to surgery resident on-call by Jeison on 12/20/2024      [Consider Follow Up: mild apparent thickening of distal esophagus near  the gastroesophageal junction]    This report will be copied to the [facility] to ensure a  provider acknowledges the finding. Access Center is available Monday  through Friday 8am-3:30 pm.    I have personally reviewed the examination and initial interpretation  and I agree with the findings.    LAURYN FOURNIER MD         SYSTEM ID:  [id number]   XR Chest Port 1 View    Narrative    Exam: XR CHEST PORT 1 VIEW, 12/23/2024 10:14 AM    Indication: tachycardia    Comparison: Chest radiograph 11/30/2074,  additional priors    Findings:   An AP semiupright view of the chest was obtained. Low lung volumes.  The cardiomediastinal silhouette is enlarged. Bilateral blunting of  the costophrenic angles suggestive of small effusions. No  pneumothorax. New elevation of the right hemidiaphragm with bilateral  streaky interstitial opacities right more apparent than left. No acute  osseous abnormality. Multiple distended loops of large bowel in the  upper abdomen.      Impression    Impression:   1. Multiple distended loops of large bowel in the upper abdomen were  not visualized on most recent CT on 12/20/2024. This may represent  worsening ileus/obstruction in this patient admitted for bowel  obstruction. Consider evaluation of the abdomen with a CT if desired.  2. Low lung volumes with right hemidiaphragm elevation favored to be  atelectasis/decreased inspiratory effort.  3. New cardiomegaly, however an element of this may be related to AP  technique.    I have personally reviewed the examination and initial interpretation  and I agree with the findings.    MATIAS LIZ MD         SYSTEM ID:  [id number]   XR Abdomen Port 1 View    Narrative    EXAMINATION:  XR ABDOMEN PORT 1 VIEW 12/23/2024     COMPARISON: Abdominal radiograph 12/20/2024, CT abdomen 12/20/2024.    HISTORY: NGT placement    TECHNIQUE: Frontal supine view of the abdomen.    FINDINGS: Gastric tube tip and side-port project over the stomach.  Dilated small bowel measuring 3.7 cm better appreciated on CT. No  abnormally dilated loops of bowel, free air, or pneumatosis in the  visualized abdomen. No portal venous gas.  Reticular attenuation in  the lungs.      Impression    IMPRESSION:   1. Gastric tube tip and side-port project over the stomach.  2. Dilated loops of small bowel measuring up to 3.7 cm as seen on CT.  3. Pulmonary edema.    I have personally reviewed the examination and initial interpretation  and I agree with the findings.    JANIE VANN,  MD         SYSTEM ID:  [id number]   XR Chest Port 1 View    Narrative    Portable chest    INDICATION: Shortness of breath    COMPARISON: Yesterday    FINDINGS: Right hemidiaphragm elevation persists with underlying low  inspiratory volumes reported taking again note. Heart size upper  normal. No pneumothorax. Linear densities in the lower lungs similar  to previous.      Impression    IMPRESSION: No significant changes from yesterday.    MATIAS LIZ MD         SYSTEM ID:  [id number]   XR Abdomen Port 1 View    Narrative    EXAMINATION: XR ABDOMEN PORT 1 VIEW  12/24/2024 9:13 AM     COMPARISON: Radiograph 12/23/2024. CT 12/20/2024.    HISTORY: SOB, concern ileus.    TECHNIQUE: Frontal view of the abdomen.    FINDINGS: Midline staple line. Similar mildly dilated loops of small  bowel projecting over the bilateral lower quadrants measuring up to  3.9 cm. No definite pneumatosis or portal venous gas.       Impression    IMPRESSION:   Mildly dilated loops of small bowel projecting over the bilateral  lower quadrants, may represent postoperative ileus although  obstruction cannot be excluded.    I have personally reviewed the examination and initial interpretation  and I agree with the findings.    BORIS PAYTON MD         SYSTEM ID:  [id number]   XR Abdomen Port 1 View    Narrative    Exam: XR ABDOMEN PORT 1 VIEW, 12/24/2024 1:27 PM    Indication: Confirm NGT position    Comparison: Abdominal radiograph 12/24/2024 at 08:26    Findings:   Portable supine AP radiographs of the abdomen. Orogastric tube tip and  sidehole projected within the stomach. Similar appearance of mildly  dilated loops of small bowel. No pneumatosis or portal venous gas.  Moderate stool burden. Subsequent closure staples overlying the mid  abdomen upper pelvis. No acute osseous abnormality. Lung bases are not  well-visualized.      Impression    Impression:   1. Orogastric tube tip and sidehole projected over the stomach. The  sidehole is near  the GE junction, consider advancing 2-3 cm.  2. Persistent dilation of small bowel loops within the abdomen.  Findings likely represent postoperative ileus though obstruction  cannot be entirely excluded.     I have personally reviewed the examination and initial interpretation  and I agree with the findings.    ALFREDA SHIPLEY MD         SYSTEM ID:  [id number]   CT Chest/Abdomen/Pelvis w Contrast     Value    Radiologist flags (Urgent)     Free fluid and free air in the abdomen concerning for    Narrative    EXAMINATION: CT CHEST/ABDOMEN/PELVIS W CONTRAST, 12/24/2024 4:36 PM    TECHNIQUE:  Helical CT images from the thoracic inlet through the  symphysis pubis were obtained with IV contrast.   Contrast dose: 96 mL [id number]    COMPARISON: Same day chest and abdominal radiographs, CT abdomen and  pelvis 12/20/2024, CT chest abdomen pelvis 2/6/2023.    HISTORY: POD#4 from ex lap, SBR for SBO. septic appearing, increased  WOB, lethargic. assess for infectious etiology    FINDINGS:  Lines and Tubes: None.    CHEST:  THYROID: Thyroid is unremarkable.    MEDIASTINUM: Heart size is within normal limits. No pericardial  effusion. Mild coronary vessel calcifications atherosclerosis  prominently of the left anterior descending. Normal caliber of the  aorta and pulmonary arteries. No mediastinal lymphadenopathy.    CHEST WALL: No suspicious axillary lymphadenopathy.    LUNGS/PLEURA: Small to moderate right pleural effusion. Compressive  atelectasis throughout the right lower lobe. Partial consolidation in  the left lower lobe with trace effusion. Ground glass pulmonary  opacities in the upper lobes bilaterally. 0.7 x 0.7 cm solid pulmonary  nodule in the right middle lobe (series 6, image 119), previously  represented on images from 2/6/2023 as a groundglass nodule measuring  approximately 0.5 x 0.4 cm. No pneumothorax. Central tracheobronchial  tree is patent.    ABDOMEN/PELVIS:  LIVER: No focal hepatic mass.    BILIARY:  Cholelithiasis. No evidence of acute cholecystitis. No  biliary dilation.    PANCREAS: No focal pancreatic mass or ductal dilation.    SPLEEN: No splenic mass.    ADRENAL GLANDS: Within normal limits.    URINARY TRACT: No suspicious renal mass, renal calculi, or  hydronephrosis. No focal bladder wall thickening.    REPRODUCTIVE ORGANS: No suspicious pelvic mass. Implanted penile pump.    STOMACH: Within normal limits.    BOWEL: Dilated loop of small bowel in the central abdomen near  anastomosis which measures 5.1 cm in diameter with upstream transition  point anteriorly. Dilatation of the transverse colon measuring up to  5.6 cm, likely due to ileus. The appendix is surgically absent.    PERITONEUM/FLUID: Free fluid throughout the abdomen and pelvis. Free  air along the liver. Scattered pockets of free air elsewhere.    VESSELS: No aneurysmal dilatation of the abdominal aorta.  The portal,  splenic, and superior mesenteric veins are patent.  The origins of the  celiac and superior mesenteric arteries are patent.    LYMPH NODES: Prominent central mesenteric lymph nodes do not meet size  criteria for enlargement.    BONES/SOFT TISSUES: Soft tissues are unremarkable. No acute osseous  abnormalities or aggressive osseous lesions.       Impression    IMPRESSION:  1. New small to moderate volume of free fluid with free air in the  abdomen is concerning for anastomotic leak in the setting of a short  segment of small bowel dilatation and edema near the surgical  anastomosis.  2. Multifocal groundglass opacities in the lungs are worrisome for  infection. Additionally, there is a small to moderate right pleural  effusion with significant compressive atelectasis of the right lower  lobe, and a small consolidated density in the left lower lobe.    [Urgent Result: Free fluid and free air in the abdomen concerning for  bowel rupture]    Finding was identified on 12/24/2024 4:55PM.     Eloina Jama and Neris Parikh were  contacted by Dr. Mahesh Ha at 12/24/2024 4:59 PM and verbalized understanding of the urgent  finding.     I have personally reviewed the examination and initial interpretation  and I agree with the findings.    BORIS PAYTON MD         SYSTEM ID:  [id number]   CT Head w/o Contrast    Narrative    CT HEAD W/O CONTRAST 12/24/2024 4:37 PM    History: POD#4 from ex lap, SBR for SBO. septic appearing, increased  WOB, lethargic. assess for infectious etiology vs stroke   ICD-10:    Comparison: Head CT 11/21/2024    Technique: Using multidetector thin collimation helical acquisition  technique, axial, coronal and sagittal CT images from the skull base  to the vertex were obtained without intravenous contrast.   (topogram) image(s) also obtained and reviewed.    Findings: There is no intracranial hemorrhage, mass effect, or midline  shift. Gray/white matter differentiation in both cerebral hemispheres  is preserved. Chronic appearing small cortical infarcts in the right  frontal convexity such as seen on series 3 image 13 which was present  back in 11/21/2024. No obvious new loss of gray-white matter  differentiation. Small hypodensity in the left basal ganglia,  unchanged. Small calcification in the right parietal cortex unchanged  Ventricles are proportionate to the cerebral sulci. The basal cisterns  are clear.    The bony calvaria and the bones of the skull base are normal.  Postoperative changes of left occipital craniotomy. The visualized  portions of the paranasal sinuses and mastoid air cells are clear.      Impression    Impression:  No acute intracranial pathology. Small parietal cortical calcification  is unchanged. Scattered few chronic appearing gray-white matter  differentiation loss in the right frontal convexity similar to prior,  likely representing chronic tiny microinfarcts. No definite acute  infarct identified.    I have personally reviewed the examination and initial interpretation  and I  agree with the findings.    BRUNILDA KENNEDY MD         SYSTEM ID:  [id number]   XR Chest Port 1 View    Narrative    Exam: XR CHEST PORT 1 VIEW, 12/24/2024 11:24 PM    Comparison: 12/24/2074 at 4:25 PM    History: line placement verification    Findings:  Portable AP view of the supine chest. Endotracheal tube is roughly 1.4  cm above the antonio. Right IJ CVC with tip in the right atrium.  Enteric tube courses beyond the field-of-view. Trachea is midline.  Cardiomediastinal silhouette is within normal limits. Bilateral  pleural effusions, right greater than left. Pneumoperitoneum is not  visible on this examination. No acute osseous abnormalities.      Impression    Impression: Endotracheal tube is roughly 1.4 cm above the antonio.  Bilateral pleural effusions with diffuse bilateral pulmonary  opacities, right greater than left, not significantly changed from  recent CT.    I have personally reviewed the examination and initial interpretation  and I agree with the findings.    BORIS PAYTON MD         SYSTEM ID:  [id number]   XR Chest Port 1 View    Narrative    Exam: XR CHEST PORT 1 VIEW, 12/25/2024 5:27 AM    Comparison: 12/24/2024    History: Monitor pulmonary opacities    Findings:  Portable AP view of the semiupright chest. Endotracheal tube tip is  roughly 1.6 cm above the antonio. Right IJ CVC with tip at the  cavoatrial junction.     Low lung volumes. Trachea is midline. Cardiomediastinal silhouette is  stable . No significant change in perihilar opacities. No  pneumothorax. Decreased size of bilateral pleural effusions. The  visualized upper abdomen is unremarkable. No acute osseous  abnormalities.      Impression    Impression:   1. Endotracheal tube is roughly 1.6 cm above the antonio.  2. Decreased size of bilateral pleural effusions. No significant  change in perihilar opacities.    I have personally reviewed the examination and initial interpretation  and I agree with the findings.    BORIS PAYTON MD          SYSTEM ID:  [id number]   XR Chest Port 1 View    Narrative    Exam: XR CHEST PORT 1 VIEW, 12/26/2024 2:13 AM    Comparison: 12/25/2024    History: Monitor pulmonary opacities    Findings:  Portable AP view of the semiupright chest. Endotracheal tube is  roughly 3 cm above the antonio. Gastric tube with tip and sidehole in  the stomach. Right IJ CVC with tip at the cavoatrial junction. Trachea  is midline. Cardiomediastinal silhouette is stable. Similar perihilar  patchy opacities. Increased right pleural effusion. No pneumothorax.  The visualized upper abdomen is unremarkable. No acute osseous  abnormalities.      Impression    Impression:   1. Slight increase in right pleural effusion. No significant change in  perihilar mixed opacities.  2. Stable position of support devices.    I have personally reviewed the examination and initial interpretation  and I agree with the findings.    BORIS PAYTON MD         SYSTEM ID:  [id number]   XR Abdomen Port 1 View    Narrative    EXAMINATION: XR ABDOMEN PORT 1 VIEW 12/26/2024 5:41 AM     COMPARISON: 12/24/2024.    HISTORY: NG confirmation    TECHNIQUE: Frontal view of the abdomen.    FINDINGS: Enteric tube with tip and sidehole within the stomach. No  abnormally dilated loops of bowel. Air-filled loop of large bowel. No  pneumatosis or portal venous gas.       Impression    IMPRESSION: Enteric tube tip and sidehole project over the stomach.    I have personally reviewed the examination and initial interpretation  and I agree with the findings.    BORIS PAYTON MD         SYSTEM ID:  [id number]   XR Chest Port 1 View    Narrative    Exam: XR CHEST PORT 1 VIEW, 12/27/2024 1:00 AM    Comparison: 12/26/2024    History: Monitor pulmonary opacities    Findings:  Portable AP view of the semiupright chest. Interval extubation.  Gastric tube with tip and sidehole in the stomach. Right upper  extremity PICC line with tip at the cavoatrial junction.    Trachea is midline. Stable  cardiomegaly. Similar perihilar opacities.  Similar right pleural effusion. No pneumothorax.      Impression    Impression:   1. Interval extubation. Right upper extremity PICC line at the  cavoatrial junction.  2. Similar size right pleural effusion. No significant change in  perihilar mixed opacities.    I have personally reviewed the examination and initial interpretation  and I agree with the findings.    LAURYN FOURNIER MD         SYSTEM ID:  [id number]   XR Chest Port 1 View    Narrative    Exam: XR CHEST PORT 1 VIEW, 12/28/2024 6:52 AM    Comparison: 12/27/2024    History: Monitor pulmonary opacities    Findings:  Portable AP view of the semiupright chest. Enteric tube courses beyond  the field-of-view. Right upper extremity PICC line with tip at the  cavoatrial junction.    Increased opacification of the right lung with large right pleural  effusion. Trachea is midline. Cardiomediastinal silhouette is stably  enlarged. No pneumothorax. The visualized upper abdomen is  unremarkable. No acute osseous abnormalities.      Impression    Impression:   1. Increased opacification of the right lung with large right pleural  effusion. This favors compressive atelectasis, though infection cannot  be ruled out.  2. Stable cardiomegaly.    I have personally reviewed the examination and initial interpretation  and I agree with the findings.    JANIE VANN MD         SYSTEM ID:  [id number]   XR Chest Port 1 View    Narrative    Exam: XR CHEST PORT 1 VIEW, 12/29/2024 2:44 AM    Comparison: 12/28/2024    History: Monitor pulmonary opacities    Findings:  Portable AP view semiupright chest. Right upper extremity PICC line  with tip at the cavoatrial junction. Trachea is midline.  Cardiomediastinal silhouette is stable. Decreased opacification of the  right lung with moderate right pleural effusion. No pneumothorax.  Diffuse mixed interstitial and airspace opacities bilaterally. The  visualized upper abdomen is unremarkable.  No acute osseous  abnormalities.      Impression    Impression:   1. Diffuse mixed interstitial and airspace opacities bilaterally  suggestive of pulmonary edema with interval decreased opacification of  the right lung and moderate size right pleural effusion.  2. Stable cardiomegaly.    I have personally reviewed the examination and initial interpretation  and I agree with the findings.    JANIE VANN MD         SYSTEM ID:  [id number]   XR Chest Port 1 View    Narrative    Exam: XR CHEST PORT 1 VIEW, 12/30/2024 2:01 AM    Indication: Monitor pulmonary opacities    Comparison: Chest x-ray 12/29/2024    Findings:   Portable AP radiograph of the chest at 45 degrees. Right upper  extremity PICC terminates at the cavoatrial junction. Trachea is  midline. The cardiac silhouette size is stable. Persistent patchy,  basilar predominant, interstitial and airspace opacities bilaterally  mild improved aeration of the right lung. Low lung volumes. Blunting  of the right greater the left costophrenic angle. No pneumothorax. No  acute osseous abnormality. Upper abdomen is unremarkable. Soft tissues  are unremarkable.      Impression    Impression:   1. Mildly decreased mixed interstitial and airspace opacities  bilaterally suggestive of pulmonary edema. Superimposed moderate right  and small left pleural effusions.  2. Stable cardiomegaly.    I have personally reviewed the examination and initial interpretation  and I agree with the findings.    JOSE VITAL DO         SYSTEM ID:  [id number]   CT Abdomen Pelvis w Contrast    Narrative    EXAMINATION: CT ABDOMEN PELVIS W CONTRAST, 12/30/2024 6:08 PM    INDICATION: SBO s/p repeat small bowel resection and ex lap. Assess  for fluid collection to guide abx duration.    COMPARISON STUDY: 12/24/2024    TECHNIQUE: CT scan of the abdomen and pelvis was performed on  multidetector CT scanner using volumetric acquisition technique and  images were reconstructed in multiple planes with  variable thickness  and reviewed on dedicated workstations.     CONTRAST: iopamidol (ISOVUE-370) solution 105 mL injected IV without  oral contrast    CT scan radiation dose is optimized to minimum requisite dose using  automated dose modulation techniques.    FINDINGS:    Lower thorax: Bilateral pleural effusions with overlying atelectasis,  right greater than left. Patchy areas of groundglass consolidation of  the upper lobes, which may be atelectasis versus edema, cannot rule  out infection.     Liver: No mass. No intrahepatic biliary ductal dilation.  Extensive  subcapsular fluid.    Biliary System: Calcified gallstone.. No extrahepatic biliary ductal  dilation.    Pancreas: No mass or pancreatic ductal dilation.    Adrenal glands: No mass or nodules    Spleen: Normal.    Kidneys: No suspicious mass, obstructing calculus or hydronephrosis.    Gastrointestinal tract : Postsurgical changes of bowel resection.  There is moderate mesenteric edema and ascites.  Normal appendix.  Small bowel wall edema likely related to sympathetic reaction from  ascites..    Mesentery/peritoneum/retroperitoneum: No mass. Mesenteric edema.  Postsurgical pneumoperitoneum.    Lymph nodes: Multiple sub-5 mm mesenteric and retroperitoneal lymph  nodes. Likely reactive.. Subcentimeter distal external iliac chain  lymph nodes bilaterally again likely reactive.    Vasculature: Patent major abdominal vasculature.    Pelvis: Urinary bladder is normal.  Penile prosthesis.    Osseous structures: No aggressive or acute osseous lesion.      Soft tissues: Within normal limits.      Impression    IMPRESSION:   1. Postsurgical changes of small bowel resection. There is moderate  amount of mesenteric edema and ascites. No drainable fluid collection.  Small amount of postsurgical pneumoperitoneum.  2. No significant change in multifocal groundglass opacities of the  lungs, which may indicate infection versus atelectasis/edema.   3. Small-to-moderate  bilateral pleural effusions with overlying  compressive atelectasis.  4. Prominent subcapsular fluid adjacent to the liver.    I have personally reviewed the examination and initial interpretation  and I agree with the findings.    MATIAS LIZ MD         SYSTEM ID:  [id number]   XR Chest Port 1 View    Narrative    A Exam: XR CHEST PORT 1 VIEW, 12/31/2024 6:59 AM    Indication: Monitor pulmonary opacities    Comparison: Chest x-ray 12/30/2024    Findings:     Portable AP supine radiograph of the chest. Right upper extremity PICC  tip terminating at the cavoatrial junction. Trachea is midline. The  cardiomediastinal silhouette is stable increased right mid and lower  zone pulmonary opacities. Persistent right effusion. No pneumothorax.  Mild elevation of the right hemidiaphragm. Persistent gaseous  distention of the stomach.      Impression    Impression:     1. Increased right perihilar and basilar opacities may represent  atelectasis, edema and/or infiltrate  2. Persistent small right pleural effusion    I have personally reviewed the examination and initial interpretation  and I agree with the findings.    LAURYN FOURNIER MD         SYSTEM ID:  [id number]     *Note: Due to a large number of results and/or encounters for the requested time period, some results have not been displayed. A complete set of results can be found in Results Review.              Medications Prior to Admission:     Medications Prior to Admission   Medication Sig Dispense Refill Last Dose/Taking    bictegravir-emtricitabine-tenofovir (BIKTARVY) -25 MG per tablet Take 1 tablet by mouth daily. 30 tablet 2 Past Month    famotidine (PEPCID) 40 MG tablet Take 40 mg by mouth daily as needed for heartburn.   Past Month    insulin aspart (NOVOLOG PEN) 100 UNIT/ML pen Inject 10 Units subcutaneously 3 times daily (with meals). Take only when you have access to a meal. 15 mL 3 Past Month    insulin glargine (LANTUS PEN) 100 UNIT/ML pen  Inject 25 Units subcutaneously every morning. REDUCE TO 15 UNITS IF YOU THINK YOU WILL NOT HAVE FOOD THAT DAY. (Patient taking differently: Inject 27 Units subcutaneously every morning. REDUCE TO 15 UNITS IF YOU THINK YOU WILL NOT HAVE FOOD THAT DAY.) 15 mL 3 Past Month    albuterol (PROAIR HFA/PROVENTIL HFA/VENTOLIN HFA) 108 (90 Base) MCG/ACT inhaler Inhale 2 puffs into the lungs every 6 hours as needed for shortness of breath, wheezing or cough. 18 g 0     albuterol (PROAIR HFA/PROVENTIL HFA/VENTOLIN HFA) 108 (90 Base) MCG/ACT inhaler Inhale 2 puffs into the lungs every 6 hours as needed for shortness of breath, wheezing or cough. 18 g 0     blood glucose (NO BRAND SPECIFIED) lancets standard Use to test blood sugar 1 time daily or as directed. 100 Lancet 4     blood glucose (NO BRAND SPECIFIED) test strip Use to test blood sugar 1 time daily or as directed. 100 strip 4     blood glucose monitoring (NO BRAND SPECIFIED) meter device kit Use to test blood sugar 3 times daily or as directed. 1 kit 0     Continuous Glucose Sensor (DEXCOM G6 SENSOR) MISC 1 each every 10 days Change every 10 days. 3 each 11     Continuous Glucose Sensor (DEXCOM G7 SENSOR) MISC 1 Device every 10 days. 9 each 3     Continuous Glucose Transmitter (DEXCOM G6 TRANSMITTER) MISC 1 each every 3 months Change every 3 months. 1 each 3     insulin pen needle (32G X 4 MM) 32G X 4 MM miscellaneous Use 4-5 pen needles daily or as directed. 100 each 2     naproxen (NAPROSYN) 500 MG tablet Take 1 tablet (500 mg) by mouth 2 times daily as needed for moderate pain. Take with meals. 30 tablet 0     Nutritional Supplements (ENSURE ACTIVE) LIQD Take 414 mLs by mouth daily 30 mL 11     pantoprazole (PROTONIX) 40 MG EC tablet Take 1 tablet (40 mg) by mouth 2 times daily (before meals). 60 tablet 0     polymixin b-trimethoprim (POLYTRIM) 58669-6.1 UNIT/ML-% ophthalmic solution Place 2 drops Into the left eye every 2 hours (while awake). 10 mL 0                Discharge Medications:     Current Discharge Medication List        CONTINUE these medications which have NOT CHANGED    Details   bictegravir-emtricitabine-tenofovir (BIKTARVY) -25 MG per tablet Take 1 tablet by mouth daily.  Qty: 30 tablet, Refills: 2    Associated Diagnoses: Human immunodeficiency virus I infection (H)      famotidine (PEPCID) 40 MG tablet Take 40 mg by mouth daily as needed for heartburn.      insulin aspart (NOVOLOG PEN) 100 UNIT/ML pen Inject 10 Units subcutaneously 3 times daily (with meals). Take only when you have access to a meal.  Qty: 15 mL, Refills: 3    Associated Diagnoses: Type 2 diabetes mellitus with other specified complication, with long-term current use of insulin (H)      insulin glargine (LANTUS PEN) 100 UNIT/ML pen Inject 25 Units subcutaneously every morning. REDUCE TO 15 UNITS IF YOU THINK YOU WILL NOT HAVE FOOD THAT DAY.  Qty: 15 mL, Refills: 3    Comments: If Lantus is not covered by insurance, may substitute Basaglar or Semglee or other insulin glargine product per insurance preference at same dose and frequency.    Associated Diagnoses: Type 2 diabetes mellitus with other specified complication, with long-term current use of insulin (H)      !! albuterol (PROAIR HFA/PROVENTIL HFA/VENTOLIN HFA) 108 (90 Base) MCG/ACT inhaler Inhale 2 puffs into the lungs every 6 hours as needed for shortness of breath, wheezing or cough.  Qty: 18 g, Refills: 0    Comments: Pharmacy may dispense brand covered by insurance (Proair, or proventil or ventolin or generic albuterol inhaler)      !! albuterol (PROAIR HFA/PROVENTIL HFA/VENTOLIN HFA) 108 (90 Base) MCG/ACT inhaler Inhale 2 puffs into the lungs every 6 hours as needed for shortness of breath, wheezing or cough.  Qty: 18 g, Refills: 0    Comments: Pharmacy may dispense brand covered by insurance (Proair, or proventil or ventolin or generic albuterol inhaler)      blood glucose (NO BRAND SPECIFIED) lancets standard Use to test  blood sugar 1 time daily or as directed.  Qty: 100 Lancet, Refills: 4    Associated Diagnoses: Type 2 diabetes mellitus with hyperglycemia, with long-term current use of insulin (H)      blood glucose (NO BRAND SPECIFIED) test strip Use to test blood sugar 1 time daily or as directed.  Qty: 100 strip, Refills: 4    Associated Diagnoses: Type 2 diabetes mellitus with hyperglycemia, with long-term current use of insulin (H)      blood glucose monitoring (NO BRAND SPECIFIED) meter device kit Use to test blood sugar 3 times daily or as directed.  Qty: 1 kit, Refills: 0    Associated Diagnoses: Type 2 diabetes mellitus with hyperglycemia, with long-term current use of insulin (H)      !! Continuous Glucose Sensor (DEXCOM G6 SENSOR) MISC 1 each every 10 days Change every 10 days.  Qty: 3 each, Refills: 11    Associated Diagnoses: Type 2 diabetes mellitus with hyperglycemia, with long-term current use of insulin (H)      !! Continuous Glucose Sensor (DEXCOM G7 SENSOR) MISC 1 Device every 10 days.  Qty: 9 each, Refills: 3    Associated Diagnoses: Type 2 diabetes mellitus with hyperglycemia, with long-term current use of insulin (H)      Continuous Glucose Transmitter (DEXCOM G6 TRANSMITTER) MISC 1 each every 3 months Change every 3 months.  Qty: 1 each, Refills: 3    Associated Diagnoses: Type 2 diabetes mellitus with hyperglycemia, with long-term current use of insulin (H)      insulin pen needle (32G X 4 MM) 32G X 4 MM miscellaneous Use 4-5 pen needles daily or as directed.  Qty: 100 each, Refills: 2    Associated Diagnoses: Type 2 diabetes mellitus with hyperglycemia, with long-term current use of insulin (H)      naproxen (NAPROSYN) 500 MG tablet Take 1 tablet (500 mg) by mouth 2 times daily as needed for moderate pain. Take with meals.  Qty: 30 tablet, Refills: 0      Nutritional Supplements (ENSURE ACTIVE) LIQD Take 414 mLs by mouth daily  Qty: 30 mL, Refills: 11    Associated Diagnoses: Type 2 diabetes mellitus with  "hyperglycemia, with long-term current use of insulin (H); Human immunodeficiency virus I infection (H)      pantoprazole (PROTONIX) 40 MG EC tablet Take 1 tablet (40 mg) by mouth 2 times daily (before meals).  Qty: 60 tablet, Refills: 0    Associated Diagnoses: Gastroesophageal reflux disease with esophagitis, unspecified whether hemorrhage      polymixin b-trimethoprim (POLYTRIM) 87099-4.1 UNIT/ML-% ophthalmic solution Place 2 drops Into the left eye every 2 hours (while awake).  Qty: 10 mL, Refills: 0       !! - Potential duplicate medications found. Please discuss with provider.                  Medications Discontinued or Adjusted During This Hospitalization:   {:489631}           Antibiotics Prescribed at Discharge:   {:708006}           Day of Discharge Physical Exam:   Temp:  [98.3  F (36.8  C)-100.3  F (37.9  C)] 99.7  F (37.6  C)  Pulse:  [] 108  Resp:  [14-24] 24  BP: (140-149)/(71-80) 140/76  SpO2:  [94 %-98 %] 94 %    General: awake, alert, no acute distress, laying comfortably in bed   CV: warm, well perfused   Pulm: breathing comfortably on room air   Abdomen: soft, non-distended, appropriately tender, no rebound or guarding;  Incision***   Extremities: no edema, moving all extremities spontaneously and without apparent deficit            Final Pathology Result:   ***           Discharge Instructions and Follow-Up:   No discharge procedures on file.           Home Health Care:     {:507650}           Discharge Disposition:     { :0039046::\"Discharged to home\"}      Condition at discharge: {:168917}    Patient was discussed with staff,  ***, on the day of discharge.    Bijan Xiong MD  Surgery Resident     "

## 2025-01-03 NOTE — PLAN OF CARE
Goal Outcome Evaluation:    Overall patient progress: no change    Outcome evaluation: A&Ox4, forgetful at times. VSS on RA. Up with Ax1, 1 episode of diarrhea this shift, voiding spontaneously in urinal. R. PICC infusing TPN and lipids. 7-10/10 pain, managed with scheduled tylenol and prn oxy and dilaudid, pt. Refused repositioning and pillow support. Wound vac at -125. Bed alarm on. Reg diet, low PO intake. Not carb compliant, not telling writer about PO intake despite education.

## 2025-01-03 NOTE — PROGRESS NOTES
Inpatient Diabetes Management Service : Progress Note     Patient: Andrew Collier   YOB: 1965    MRN: [id number]     Date of Consult : 01/03/2025   Date of Admission: 12/20/2024          Assessment and Recommendations:         Andrew Collier is a 59 year old with Type 2 Diabetes Mellitus. PMH includes HIV on ART,methamphetamine use,  and recent admission from 12/17-12/19 for SBO managed successfully with bowel rest/GGC who presented to ED again on 12/20/24 for recurrent SBO. Currently  s/p ex lap/SBR on 12/20 and then s/p ex-lap/SBR/re-anastomosis for anastomotic leak on 12/24/24. Extubated 12/26 AM. Inpatient Diabetes Service consulted for management to get patient off the insulin drip now that he is moved to the med surg floor.    Assessment:     Type 2 Diabetes Mellitus, complicated by peripheral neuropathy, ?gastroparesis. Poor control, A1c 10.5% (10/23/24)  Abdominal pain, n/v, ?gastroparesis   Methamphetamine use disorder  Food and housing insecurities   HIV  Recurrent SBO,  s/p ex lap/SBR on 12/20 and then s/p ex-lap/SBR/re-anastomosis for anastomotic leak on 12/24/24  TPN induced hyperglycemia  Stress induced hyperglycemia    Plan/Recommendations:      -TPN is discontinued per primary team after current bag which stopped this am, 1/3/2025 at 07:53am as was cycled over 12 hours.  So stop regular insulin.    -Increase Lantus 30 units q 24 hrs at 09:00 am.     - Increase Novolog Meal Coverage: 1 units per 7 g CHO, TID AC and PRN with snacks/supplements( increase from 1:8)     - Continue Novolog Correction Scale: custom resistance (ISF: 1/20)  TID AC / HS / 0200     - BG monitoring: TID AC, HS, 0200  - Hypoglycemia protocol    - Carb counting protocol     Discussion:    BG running high 183 to 284.   Drank a bosost per RN note and wasn't ure when he drank it so did not give carb coverage.  Then Andrew is drinking pepsi this am.He is feeling like eating more today.  Asked him to  drink diet pepsi as did nurse.  He said he wants to drink what he wants but agreed to calling for carb coverage.  Increase lantus slightly to 30 units and increased carb coverage.  Hard to tell what he is running off the TPN.  WIll be able to get an idea today. ( Had 29 units of regular insulin in TPN)  During last admission he was on lantus 25 units daily and at the [facility] he says he was taking 27 lantus bid.  Cannot really tell if he took the 10 units of Novolog.    Per SW: (discussion in person)  So far unable to locate a TCU that is able to take the patient.  She believes that he likely will have to stay in the hospital to heal and for rehab.        TENTATIVE Diabetes Management Discharge Plan  Instructions to patient will be  posted in AVS and discussed on day of discharge.   Medications and supplies are to be ordered by primary service on discharge.   *please use the DIAB non-branded discharge supply order set ([id number])*    Patient will need the following supplies prescribed:   Nothing since going to TCU     Blood glucose monitoring:   Three times daily before meals, and at bedtime.    Blood Glucose (BG) goals:  mg/dL before meals, less than 180 mg/dL 2 hrs after meals.     Glucose Control Regimen:      2) Long-acting insulin: glargine (Lantus) - take  units once daily at . Take at same time each day.     3) Rapid-acting insulin: aspart (Novolog) carbohydrate coverage/mealtime insulin - take 1 unit per  grams of carbohydrates before meals and snacks.  Take  units before average meals that contain carbohydrates  Take  units before small meals or snacks that contain carbohydrates    4) Rapid-acting insulin: aspart (Novolog) correction - see chart below. Take for high blood glucoses three times daily before meals and at bedtime.   You may add the correction dose to the carbohydrate coverage/mealtime insulin dose and give in one injection--ideally 10-15 minutes before a meal.  You may take the  correction dose even if you skip a meal (as long as it has been 4 hours since previous correction dose).       Pre-meal correction scale:    Blood Glucose Insulin Novolog Before Meals:   Less than 140 0 units   140 -164  1 units   165 -189 2 units   190 - 214 3 units   215 - 239 4 units    240 - 264  5 units   265 - 289 6 units   290 - 314 7 units   315 - 339 8 units   340 - 364 9 units   365 or more 10 units       Bedtime correction scale:     Blood Glucose Insulin Novolog At Bedtime:   Less than 200 0 units   200 - 224 1 units   225 - 249 2 units   250 - 274 3 units   275 - 299  4 units    300 - 324  5 units   325 - 349 6 units   350 or more 7 units           Outpatient Follow-Up: An appointment request was sent to the [facility] coordinator to schedule your outpatient diabetes appointment 1-2 weeks from discharge from the TCU.     If you have urgent questions or concerns regarding your blood sugars or insulin, you may contact [phone] (the main hospital ). Ask to speak with the Endocrinologist on call.        Thank you for letting the Diabetes Management Team be involved in your care!      Discharge Planning: (tentative)    Medications: TBD    Test Claims: TBD, already on insulin.   Education:  Needs to be assessed closer to discharge.  Already knows how to get insulin  Outpatient Follow-up:  recommend [facility] End[facility]ology vs PCP        Interval History/Review of Systems :   The last 24 hours progress and nursing notes reviewed.  Patient says no nauea or emesis or diarrhea.  Abdominal pain is better. Maybe better appetite today.    creat=0.64  Planned Procedures/Surgeries: none                 Recent Labs   Lab 01/02/25  0337 01/02/25  0336 01/01/25  2130 01/01/25  1723 01/01/25  1325 01/01/25  1003   * 247* 294* 235* 225* 141*               Inpatient Glucose Trends:               Diabetes History:   Diabetes Type and Duration:  T2DM since ~2017 (2015 per A1c 6.7% 10/2015)    GAD65 antibody, zinc transporter 8 antibody, islet antibody, insulin antibody, and c-peptide  not available on epic search     PTA Medication Regimen: Lantus 27 units bid and novolog 10 units per meal- pharmacist says he was taking lantus 27 units daily and Novlog 10 units with meals.  Last time he was discharged he was discharged on lantus 25 units daily and 10 units of Novolog with meal      Historical Diabetes Medications:    Glipizide ER 10 mg daily --> discontinued 7/25/24, patient not sure why this was discontinued and notes aren't clear.  - Januvia ? - patient doesn't remember taking this, was using last yr per chart review  - Hx mild allergy to GLP-1 (trulicity) rash on chest/arms, legs --> also has hx of gastroparesis and bowel obstruction.   - Metformin, discontinued 2/2 history of side effects of diarrhea   - Jardiance: may have caused wounds on leg (unclear hx) per chart review    Glucose monitoring device/frequency/trends: not using the dexcom, not checking frequently       Outpatient Diabetes Provider:  no-[facility] endocrinology visit 10/10/2023 following hospital admission 9/2023. Patient states he doesn't have a PCP. Established care with internal med MD (Anna Dailey) 2/2024. Follows with pharmacist (Dorie Holland) for Medication Therapy Management, last saw Skyler, 7/25/24.  Has mostly seen his IDS provider since.  Has been in and out of the ER/hospital frequently and has not seen anyone since for his diabetes.              Medications Impacting Glycemia:    Steroids: with surgery, received dex 8 mg x1 and then none since.  No plan to give steroids  D5W containing solutions/medications: TPN  Other medications impacting glucose: None         Diet/Nutrition:    Orders Placed This Encounter      Regular Diet Adult     Supplements:  none  TPN: started on 12/25/2024: has reached goal on TPN, has 260g of dextrose 1/2/2025 cycle to 12 hours today, same dextrose 180g            Physical  "Exam:    BP (!) 140/76 (BP Location: Left arm)   Pulse 108   Temp 99.7  F (37.6  C) (Oral)   Resp 24   Ht 1.626 m (5' 4\")   Wt 77.4 kg (170 lb 10.2 oz)   SpO2 94%   BMI 29.29 kg/m       General:   NAD, laying in bed.  Lungs: unlabored breathing on RA.  ABD:  rounded,  Has large wound on abdomen with wound vac  Skin:  warm and dry, no obvious lesions  MSK:   moving all extremities  Lymp:   no LE edema  Feet: warm and dry. no open sores/wounds. Not examined today   Mental Status:  Alert and oriented x3  Psych:   Cooperative, good eye contact, full/appropriate affect            Laboratory Data:      Lab Results   Component Value Date    A1C 10.5 (H) 10/23/2024    A1C 11.7 (H) 07/13/2024    A1C 13.4 (H) 02/06/2024    A1C 10.7 (H) 11/09/2023    A1C 13.3 (H) 08/06/2023    A1C 9.7 (H) 11/11/2020    A1C 8.2 (H) 09/25/2020    A1C 8.2 (H) 08/27/2020    A1C 11.6 (H) 02/11/2020    A1C 12.3 (H) 01/07/2020     ROUTINE IP LABS (Last four results)  BMP  Recent Labs   Lab 01/03/25  0539 01/03/25  0140 01/03/25  0042 01/02/25  2145 01/02/25  1845 01/02/25  1658 01/02/25  1325 01/02/25  0755 01/02/25  0446 01/01/25  0820 01/01/25  0655 12/31/24  0646 12/31/24  0557   *  130*  --  128*  --  127*  --  129*  --  128*  --  129*  --  133*   POTASSIUM 4.5  --   --   --   --   --   --   --  4.4  --  4.3  --  3.6   CHLORIDE 98  --   --   --   --   --   --   --  96*  --  96*  --  102   LINDA 8.2*  --   --   --   --   --   --   --  7.6*  --  7.7*  --  7.4*   CO2 24  --   --   --   --   --   --   --  23  --  23  --  23   BUN 19.8  --   --   --   --   --   --   --  17.7  --  15.7  --  17.1   CR 0.64*  --   --   --   --   --   --   --  0.65*  --  0.64*  --  0.68   * 290*  --  183*  --  219*  --    < > 290*   < > 229*   < > 132*    < > = values in this interval not displayed.     CBC  Recent Labs   Lab 01/03/25  0539 01/02/25  0446 01/01/25  0655 12/31/24  0557   WBC 10.3 10.2 10.9 10.0   RBC 2.88* 2.91* 3.27* 3.07*   HGB 8.0* " 8.2* 9.1* 8.7*   HCT 24.7* 24.6* 27.5* 26.2*   MCV 86 85 84 85   MCH 27.8 28.2 27.8 28.3   MCHC 32.4 33.3 33.1 33.2   RDW 14.0 14.3 14.4 14.5    340 306 237     INR  Recent Labs   Lab 01/02/25  0446 12/30/24  0405   INR 1.30* 1.30*     Lab Results   Component Value Date    AST 49 01/02/2025    AST 12 06/24/2021     Lab Results   Component Value Date    ALT 6 01/02/2025    ALT 20 06/24/2021       To contact Inpatient Diabetes Service:     7 AM - 5 PM: Page the IDS KASSY following the patient that day (see filed or incomplete progress notes/consult notes under Endocrinology)    OR if uncertain of provider assignment: page job code [phone]    5 PM - 7 AM: First call after hours is to primary service.    For urgent after-hours questions, page job code for on call fellow: [phone]       Kate Quintero PA-C  Inpatient Diabetes Service  [phone]  Available by [employer]  Date of Service:  1/3/2025    I spent a total of 45 minutes on the date of the encounter doing prep/post-work, chart review, history and exam, documentation and further activities per the note including lab review, multidisciplinary communication, counseling the patient and/or coordinating care regarding acute hyper/hypoglycemic management, as well as discharge management and planning/communication.  See note for details.

## 2025-01-03 NOTE — PLAN OF CARE
Goal Outcome Evaluation:           Overall Patient Progress: no changeOverall Patient Progress: no change    Outcome Evaluation: 9413-0350 No major events during care. Sleeping in between cares, not want to interact much. Wound vac CDI at 125. Ate one slice of quesadilla. Sipping on pop, encouraged to drink water. Offered walk and pt declined, informed pt that they can call at anytime for walk if later would be better. Forgetful at times. Elevated temp on prior shift, recheck WNL. Skin normal temperature to touch. Pt denying any fever/chills or any acute changes in how they are feeling. Bed alarm on and call light within reach. [place] counts and carb coverage in place.

## 2025-01-04 LAB
ANION GAP SERPL CALCULATED.3IONS-SCNC: 10 MMOL/L (ref 7–15)
BUN SERPL-MCNC: 19.2 MG/DL (ref 8–23)
CALCIUM SERPL-MCNC: 7.7 MG/DL (ref 8.8–10.4)
CHLORIDE SERPL-SCNC: 94 MMOL/L (ref 98–107)
CREAT SERPL-MCNC: 0.61 MG/DL (ref 0.67–1.17)
EGFRCR SERPLBLD CKD-EPI 2021: >90 ML/MIN/1.73M2
ERYTHROCYTE [DISTWIDTH] IN BLOOD BY AUTOMATED COUNT: 14.2 % (ref 10–15)
GLUCOSE BLDC GLUCOMTR-MCNC: 101 MG/DL (ref 70–99)
GLUCOSE BLDC GLUCOMTR-MCNC: 116 MG/DL (ref 70–99)
GLUCOSE BLDC GLUCOMTR-MCNC: 132 MG/DL (ref 70–99)
GLUCOSE BLDC GLUCOMTR-MCNC: 147 MG/DL (ref 70–99)
GLUCOSE BLDC GLUCOMTR-MCNC: 96 MG/DL (ref 70–99)
GLUCOSE SERPL-MCNC: 128 MG/DL (ref 70–99)
HCO3 SERPL-SCNC: 24 MMOL/L (ref 22–29)
HCT VFR BLD AUTO: 24.7 % (ref 40–53)
HGB BLD-MCNC: 8 G/DL (ref 13.3–17.7)
MAGNESIUM SERPL-MCNC: 1.9 MG/DL (ref 1.7–2.3)
MCH RBC QN AUTO: 27.5 PG (ref 26.5–33)
MCHC RBC AUTO-ENTMCNC: 32.4 G/DL (ref 31.5–36.5)
MCV RBC AUTO: 85 FL (ref 78–100)
PHOSPHATE SERPL-MCNC: 3 MG/DL (ref 2.5–4.5)
PLATELET # BLD AUTO: 553 10E3/UL (ref 150–450)
POTASSIUM SERPL-SCNC: 4.6 MMOL/L (ref 3.4–5.3)
RBC # BLD AUTO: 2.91 10E6/UL (ref 4.4–5.9)
SODIUM SERPL-SCNC: 128 MMOL/L (ref 135–145)
WBC # BLD AUTO: 9.4 10E3/UL (ref 4–11)

## 2025-01-04 PROCEDURE — 250N000013 HC RX MED GY IP 250 OP 250 PS 637: Performed by: STUDENT IN AN ORGANIZED HEALTH CARE EDUCATION/TRAINING PROGRAM

## 2025-01-04 PROCEDURE — 250N000013 HC RX MED GY IP 250 OP 250 PS 637

## 2025-01-04 PROCEDURE — 84100 ASSAY OF PHOSPHORUS: CPT | Performed by: SURGERY

## 2025-01-04 PROCEDURE — 36415 COLL VENOUS BLD VENIPUNCTURE: CPT

## 2025-01-04 PROCEDURE — 250N000012 HC RX MED GY IP 250 OP 636 PS 637: Performed by: NURSE PRACTITIONER

## 2025-01-04 PROCEDURE — 250N000011 HC RX IP 250 OP 636

## 2025-01-04 PROCEDURE — 250N000011 HC RX IP 250 OP 636: Performed by: STUDENT IN AN ORGANIZED HEALTH CARE EDUCATION/TRAINING PROGRAM

## 2025-01-04 PROCEDURE — 80048 BASIC METABOLIC PNL TOTAL CA: CPT

## 2025-01-04 PROCEDURE — 250N000013 HC RX MED GY IP 250 OP 250 PS 637: Performed by: SURGERY

## 2025-01-04 PROCEDURE — 250N000009 HC RX 250: Performed by: NURSE PRACTITIONER

## 2025-01-04 PROCEDURE — 85041 AUTOMATED RBC COUNT: CPT

## 2025-01-04 PROCEDURE — 82565 ASSAY OF CREATININE: CPT

## 2025-01-04 PROCEDURE — 250N000013 HC RX MED GY IP 250 OP 250 PS 637: Performed by: NURSE PRACTITIONER

## 2025-01-04 PROCEDURE — 99232 SBSQ HOSP IP/OBS MODERATE 35: CPT | Performed by: NURSE PRACTITIONER

## 2025-01-04 PROCEDURE — 83735 ASSAY OF MAGNESIUM: CPT | Performed by: SURGERY

## 2025-01-04 PROCEDURE — 120N000002 HC R&B MED SURG/OB UMMC

## 2025-01-04 RX ORDER — HYDROMORPHONE HCL IN WATER/PF 6 MG/30 ML
0.2 PATIENT CONTROLLED ANALGESIA SYRINGE INTRAVENOUS
Status: DISCONTINUED | OUTPATIENT
Start: 2025-01-04 | End: 2025-01-07

## 2025-01-04 RX ADMIN — HYDROMORPHONE HYDROCHLORIDE 0.2 MG: 0.2 INJECTION, SOLUTION INTRAMUSCULAR; INTRAVENOUS; SUBCUTANEOUS at 23:02

## 2025-01-04 RX ADMIN — IBUPROFEN 600 MG: 600 TABLET ORAL at 09:09

## 2025-01-04 RX ADMIN — ENOXAPARIN SODIUM 40 MG: 40 INJECTION SUBCUTANEOUS at 20:04

## 2025-01-04 RX ADMIN — OXYCODONE HYDROCHLORIDE 5 MG: 5 TABLET ORAL at 21:14

## 2025-01-04 RX ADMIN — ACETAMINOPHEN 975 MG: 325 TABLET, FILM COATED ORAL at 21:14

## 2025-01-04 RX ADMIN — Medication 5 MG: at 18:57

## 2025-01-04 RX ADMIN — OXYCODONE HYDROCHLORIDE 5 MG: 5 TABLET ORAL at 09:09

## 2025-01-04 RX ADMIN — OXYCODONE HYDROCHLORIDE 5 MG: 5 TABLET ORAL at 02:01

## 2025-01-04 RX ADMIN — SENNOSIDES 2 TABLET: 8.6 TABLET, FILM COATED ORAL at 09:09

## 2025-01-04 RX ADMIN — PANTOPRAZOLE SODIUM 40 MG: 40 INJECTION, POWDER, FOR SOLUTION INTRAVENOUS at 09:09

## 2025-01-04 RX ADMIN — ACETAMINOPHEN 975 MG: 325 TABLET, FILM COATED ORAL at 13:34

## 2025-01-04 RX ADMIN — INSULIN GLARGINE 24 UNITS: 100 INJECTION, SOLUTION SUBCUTANEOUS at 09:14

## 2025-01-04 RX ADMIN — BICTEGRAVIR SODIUM, EMTRICITABINE, AND TENOFOVIR ALAFENAMIDE FUMARATE 1 TABLET: 50; 200; 25 TABLET ORAL at 13:34

## 2025-01-04 RX ADMIN — LIDOCAINE 4% 1 PATCH: 40 PATCH TOPICAL at 20:02

## 2025-01-04 RX ADMIN — QUETIAPINE FUMARATE 50 MG: 50 TABLET ORAL at 21:14

## 2025-01-04 RX ADMIN — POLYETHYLENE GLYCOL 3350 17 G: 17 POWDER, FOR SOLUTION ORAL at 09:15

## 2025-01-04 RX ADMIN — Medication 5 ML: at 13:35

## 2025-01-04 RX ADMIN — SENNOSIDES 2 TABLET: 8.6 TABLET, FILM COATED ORAL at 20:04

## 2025-01-04 RX ADMIN — OXYCODONE HYDROCHLORIDE 5 MG: 5 TABLET ORAL at 13:01

## 2025-01-04 ASSESSMENT — ACTIVITIES OF DAILY LIVING (ADL)
ADLS_ACUITY_SCORE: 39
ADLS_ACUITY_SCORE: 41
ADLS_ACUITY_SCORE: 39
ADLS_ACUITY_SCORE: 41
ADLS_ACUITY_SCORE: 39
ADLS_ACUITY_SCORE: 41
ADLS_ACUITY_SCORE: 39
ADLS_ACUITY_SCORE: 41
ADLS_ACUITY_SCORE: 39

## 2025-01-04 NOTE — PROGRESS NOTES
Inpatient Diabetes Management Service : Progress Note     Andrew Collire is a 59 year old with Type 2 Diabetes Mellitus. PMH includes HIV on ART,methamphetamine use,  and recent admission from 12/17-12/19 for SBO managed successfully with bowel rest/GGC who presented to ED again on 12/20/24 for recurrent SBO. Currently  s/p ex lap/SBR on 12/20 and then s/p ex-lap/SBR/re-anastomosis for anastomotic leak on 12/24/24. Extubated 12/26 AM.     Inpatient Diabetes Service consulted for management to get patient off the insulin drip now that he is moved to the med surg floor.         Assessment and Recommendations:       Assessment:     Type 2 Diabetes Mellitus, c/b peripheral neuropathy, gastroparesis. Sub-optimal control. A1c 10.5% (10/23/24)  Abd pain with known hx of gastroparesis (received dx and education 9/25/2023)  Methamphetamine use disorder  Food and housing insecurities resulting in binge eating and poor dietary choices  HIV  Recurrent SBO,  s/p ex lap/SBR on 12/20 and then s/p ex-lap/SBR/re-anastomosis for anastomotic leak on 12/24/24  Stress hyperglycemia (TPN now stopped as of 1/3)  8.   BMI: 29    Plan/Recommendations:      - Decrease Lantus 24 units q 24 hrs at 0900 (BG trending < target since TPN stopped)   - Novolog Meal Coverage: 1 units per 8 g CHO, TID AC and PRN with snacks/supplements  - Novolog Correction Scale: custom resistance (ISF: 1/35)  TID AC / HS / 0200   - BG monitoring: TID AC, HS, 0200  - Hypoglycemia protocol    - Carb counting protocol     Discussion:      BG now trending closer to target with fasting Bg at 147 mg/dL this AM.  Will trend and adjust as indicated.   No acute concerns over the interval.     Per SW:  So far unable to locate a TCU that is able to take the patient.  She believes that he likely will have to stay in the hospital to heal and for rehab.      TENTATIVE Diabetes Management Discharge Plan  Instructions to patient will be  posted in AVS and discussed on  day of discharge.   Medications and supplies are to be ordered by primary service on discharge.   *please use the DIAB non-branded discharge supply order set ([id number])*    Patient will need the following supplies prescribed:   Nothing since going to TCU     Blood glucose monitoring:   Three times daily before meals, and at bedtime.    Blood Glucose (BG) goals:  mg/dL before meals, less than 180 mg/dL 2 hrs after meals.     Glucose Control Regimen:  2) Long-acting insulin: glargine (Lantus) - take  ** units once daily at ** Take at same time each day.     3) Rapid-acting insulin: aspart (Novolog) carbohydrate coverage/mealtime insulin - take 1 unit per ** grams of carbohydrates before meals and snacks.  Take  units before average meals that contain carbohydrates  Take  units before small meals or snacks that contain carbohydrates    4) Rapid-acting insulin: aspart (Novolog) correction - see chart below. Take for high blood glucoses three times daily before meals and at bedtime.   You may add the correction dose to the carbohydrate coverage/mealtime insulin dose and give in one injection--ideally 10-15 minutes before a meal.  You may take the correction dose even if you skip a meal (as long as it has been 4 hours since previous correction dose).       Outpatient Follow-Up: An appointment request was sent to the [facility] coordinator to schedule your outpatient diabetes appointment 1-2 weeks from discharge from the TCU.       Interval History/Review of Systems :   The last 24 hours progress and nursing notes reviewed.     - no new or acute concerns.     Inpatient Glucose Trends:             Diabetes History:   Diabetes Type and Duration:  T2DM since ~2017 (2015 per A1c 6.7% 10/2015)   GAD65 antibody, zinc transporter 8 antibody, islet antibody, insulin antibody, and c-peptide  not available on epic search     PTA Medication Regimen: Lantus 27 units bid and novolog 10 units per meal-  "pharmacist says he was taking lantus 27 units daily and Novlog 10 units with meals.  Last time he was discharged he was discharged on lantus 25 units daily and 10 units of Novolog with meal      Historical Diabetes Medications:    Glipizide ER 10 mg daily --> discontinued 7/25/24, patient not sure why this was discontinued and notes aren't clear.  - Januvia ? - patient doesn't remember taking this, was using last yr per chart review  - Hx mild allergy to GLP-1 (trulicity) rash on chest/arms, legs --> also has hx of gastroparesis and bowel obstruction.   - Metformin, discontinued 2/2 history of side effects of diarrhea   - Jardiance: may have caused wounds on leg (unclear hx) per chart review    Glucose monitoring device/frequency/trends: not using the dexcom, not checking frequently    Outpatient Diabetes Provider:  no-[facility] endocrinology visit 10/10/2023 following hospital admission 9/2023. Patient states he doesn't have a PCP. Established care with internal med MD (Anna Dailey) 2/2024. Follows with pharmacist (Dorie Holland) for Medication Therapy Management, last saw Skyler, 7/25/24.  Has mostly seen his IDS provider since.  Has been in and out of the ER/hospital frequently and has not seen anyone since for his diabetes.        Medications Impacting Glycemia:    Steroids: with surgery, received dex 8 mg x1 and then none since.  No plan to give steroids  D5W containing solutions/medications: None  Other medications impacting glucose: None         Diet/Nutrition:    Orders Placed This Encounter      Regular Diet Adult     Supplements:  none  TPN:  TPN now stopped   started on 12/25/2024: has reached goal on TPN, has 260g of dextrose 1/2/2025 cycle to 12 hours today, same dextrose 180g           Physical Exam:    /64 (BP Location: Left arm)   Pulse 99   Temp 97.8  F (36.6  C) (Oral)   Resp 24   Ht 1.626 m (5' 4\")   Wt 74.9 kg (165 lb 2 oz)   SpO2 95%   BMI 28.34 kg/m     General:   " NAD, laying in bed.  Lungs: unlabored breathing on RA.  ABD:  rounded,  Has large wound on abdomen with wound vac  Skin:  warm and dry, no obvious lesions  MSK:   moving all extremities  Lymp:   no LE edema  Feet: not examined today   Mental Status:  Alert and oriented x3  Psych:   Cooperative, good eye contact, full/appropriate affect         Laboratory Data:      Lab Results   Component Value Date    A1C 10.5 (H) 10/23/2024    A1C 11.7 (H) 07/13/2024    A1C 13.4 (H) 02/06/2024    A1C 10.7 (H) 11/09/2023    A1C 13.3 (H) 08/06/2023    A1C 9.7 (H) 11/11/2020    A1C 8.2 (H) 09/25/2020    A1C 8.2 (H) 08/27/2020    A1C 11.6 (H) 02/11/2020    A1C 12.3 (H) 01/07/2020     ROUTINE IP LABS (Last four results)  BMP  Recent Labs   Lab 01/04/25  0415 01/04/25  0009 01/03/25  1835 01/03/25  1144 01/03/25  0801 01/03/25  0539 01/03/25  0140 01/03/25  0042 01/02/25  2145 01/02/25  1845 01/02/25  1658 01/02/25  1325 01/02/25  0755 01/02/25  0446 01/01/25  0820 01/01/25  0655 12/31/24  0646 12/31/24  0557   NA  --   --   --   --   --  130*  130*  --  128*  --  127*  --  129*  --  128*  --  129*  --  133*   POTASSIUM  --   --   --   --   --  4.5  --   --   --   --   --   --   --  4.4  --  4.3  --  3.6   CHLORIDE  --   --   --   --   --  98  --   --   --   --   --   --   --  96*  --  96*  --  102   LINDA  --   --   --   --   --  8.2*  --   --   --   --   --   --   --  7.6*  --  7.7*  --  7.4*   CO2  --   --   --   --   --  24  --   --   --   --   --   --   --  23  --  23  --  23   BUN  --   --   --   --   --  19.8  --   --   --   --   --   --   --  17.7  --  15.7  --  17.1   CR  --   --   --   --   --  0.64*  --   --   --   --   --   --   --  0.65*  --  0.64*  --  0.68   GLC 96 101* 155* 234*   < > 301*   < >  --    < >  --    < >  --    < > 290*   < > 229*   < > 132*    < > = values in this interval not displayed.     CBC  Recent Labs   Lab 01/03/25  0539 01/02/25  0446 01/01/25  0655 12/31/24  0557   WBC 10.3 10.2 10.9 10.0   RBC  2.88* 2.91* 3.27* 3.07*   HGB 8.0* 8.2* 9.1* 8.7*   HCT 24.7* 24.6* 27.5* 26.2*   MCV 86 85 84 85   MCH 27.8 28.2 27.8 28.3   MCHC 32.4 33.3 33.1 33.2   RDW 14.0 14.3 14.4 14.5    340 306 237     INR  Recent Labs   Lab 01/02/25  0446 12/30/24  0405   INR 1.30* 1.30*     Lab Results   Component Value Date    AST 49 01/02/2025    AST 12 06/24/2021     Lab Results   Component Value Date    ALT 6 01/02/2025    ALT 20 06/24/2021     Dalila Philippe, VIKASH-CNP, BC-ADM   Inpatient Diabetes Service  Available on [employer] - when on duty.     To contact Inpatient Diabetes Service:     7 AM - 5 PM: Page the IDS KASSY following the patient that day (see filed or incomplete progress notes/consult notes under Endocrinology)    OR if uncertain of provider assignment: page job code [phone]    5 PM - 7 AM: First call after hours is to primary service.    For urgent after-hours questions, page job code for on call fellow: [phone]     I spent a total of 40 minutes on the date of the encounter doing prep/post-work, chart review, history and exam, documentation and further activities per the note including lab review, multidisciplinary communication, counseling the patient and/or coordinating care regarding acute hyper/hypoglycemic management, as well as discharge management and planning/communication.  See note for details.      Please notify inpatient diabetes service if changes are planned to steroids, nutrition, or if procedures are planned requiring prolonged NPO status.Diabetes Management Team job code: [phone]

## 2025-01-04 NOTE — PLAN OF CARE
Goal Outcome Evaluation:      Plan of Care Reviewed With: patient    Overall Patient Progress: improvingOverall Patient Progress: improving    Outcome Evaluation: Pt is alert and oriented x 4, but intermittently drifts mid-conversation, forgetful. Mid abdominal wound vac intact. Pain is controlled with scheduled Tyolenol and Oxycodone.  and 96, applesauce and orange juice given. Continue to montor pt and follow plan of cares.

## 2025-01-04 NOTE — PROGRESS NOTES
Calorie Count  Intake recorded for: 1/3  Total Kcals: 0 Total Protein: 0  Kcals from Hospital Food: 0   Protein: 0g  Kcals from Outside Food (average):0 Protein: 0g  # Meals Ordered from Kitchen: 3 meals ordered  # Meals Recorded: 0 meals recorded  # Supplements Recorded: none recorded

## 2025-01-04 NOTE — PLAN OF CARE
Goal Outcome Evaluation:      Plan of Care Reviewed With: patient    Overall Patient Progress: improvingOverall Patient Progress: improving    Outcome Evaluation: patient had several incont bowel movments today small amount each time. blood sugars getting better this eduarda. did relize he has been drinking regular pepsi and now can only order diet pop or will let staff knows if he drinks or eats anything besides what is on his tray. wound vac in middle of abd. bg being monitored. patient refused to sit in chiar for meals. cont plan of care

## 2025-01-05 LAB
ANION GAP SERPL CALCULATED.3IONS-SCNC: 9 MMOL/L (ref 7–15)
BUN SERPL-MCNC: 18.6 MG/DL (ref 8–23)
CALCIUM SERPL-MCNC: 7.5 MG/DL (ref 8.8–10.4)
CHLORIDE SERPL-SCNC: 98 MMOL/L (ref 98–107)
CREAT SERPL-MCNC: 0.68 MG/DL (ref 0.67–1.17)
EGFRCR SERPLBLD CKD-EPI 2021: >90 ML/MIN/1.73M2
ERYTHROCYTE [DISTWIDTH] IN BLOOD BY AUTOMATED COUNT: 14.2 % (ref 10–15)
GLUCOSE BLDC GLUCOMTR-MCNC: 144 MG/DL (ref 70–99)
GLUCOSE BLDC GLUCOMTR-MCNC: 149 MG/DL (ref 70–99)
GLUCOSE BLDC GLUCOMTR-MCNC: 199 MG/DL (ref 70–99)
GLUCOSE BLDC GLUCOMTR-MCNC: 201 MG/DL (ref 70–99)
GLUCOSE BLDC GLUCOMTR-MCNC: 213 MG/DL (ref 70–99)
GLUCOSE BLDC GLUCOMTR-MCNC: 231 MG/DL (ref 70–99)
GLUCOSE SERPL-MCNC: 216 MG/DL (ref 70–99)
HCO3 SERPL-SCNC: 24 MMOL/L (ref 22–29)
HCT VFR BLD AUTO: 24.8 % (ref 40–53)
HGB BLD-MCNC: 8 G/DL (ref 13.3–17.7)
MCH RBC QN AUTO: 27.4 PG (ref 26.5–33)
MCHC RBC AUTO-ENTMCNC: 32.3 G/DL (ref 31.5–36.5)
MCV RBC AUTO: 85 FL (ref 78–100)
PLATELET # BLD AUTO: 584 10E3/UL (ref 150–450)
POTASSIUM SERPL-SCNC: 4.4 MMOL/L (ref 3.4–5.3)
RBC # BLD AUTO: 2.92 10E6/UL (ref 4.4–5.9)
SODIUM SERPL-SCNC: 131 MMOL/L (ref 135–145)
WBC # BLD AUTO: 9.1 10E3/UL (ref 4–11)

## 2025-01-05 PROCEDURE — 250N000013 HC RX MED GY IP 250 OP 250 PS 637

## 2025-01-05 PROCEDURE — 250N000011 HC RX IP 250 OP 636

## 2025-01-05 PROCEDURE — 120N000002 HC R&B MED SURG/OB UMMC

## 2025-01-05 PROCEDURE — 250N000009 HC RX 250: Performed by: NURSE PRACTITIONER

## 2025-01-05 PROCEDURE — 85014 HEMATOCRIT: CPT

## 2025-01-05 PROCEDURE — 250N000013 HC RX MED GY IP 250 OP 250 PS 637: Performed by: SURGERY

## 2025-01-05 PROCEDURE — 85048 AUTOMATED LEUKOCYTE COUNT: CPT

## 2025-01-05 PROCEDURE — 36415 COLL VENOUS BLD VENIPUNCTURE: CPT

## 2025-01-05 PROCEDURE — 250N000013 HC RX MED GY IP 250 OP 250 PS 637: Performed by: NURSE PRACTITIONER

## 2025-01-05 PROCEDURE — 250N000011 HC RX IP 250 OP 636: Performed by: STUDENT IN AN ORGANIZED HEALTH CARE EDUCATION/TRAINING PROGRAM

## 2025-01-05 PROCEDURE — 99232 SBSQ HOSP IP/OBS MODERATE 35: CPT | Performed by: NURSE PRACTITIONER

## 2025-01-05 PROCEDURE — 250N000013 HC RX MED GY IP 250 OP 250 PS 637: Performed by: STUDENT IN AN ORGANIZED HEALTH CARE EDUCATION/TRAINING PROGRAM

## 2025-01-05 PROCEDURE — 80048 BASIC METABOLIC PNL TOTAL CA: CPT

## 2025-01-05 RX ORDER — METHOCARBAMOL 500 MG/1
500 TABLET, FILM COATED ORAL 3 TIMES DAILY
Status: DISCONTINUED | OUTPATIENT
Start: 2025-01-05 | End: 2025-01-16

## 2025-01-05 RX ADMIN — METHOCARBAMOL TABLETS 500 MG: 500 TABLET, COATED ORAL at 08:47

## 2025-01-05 RX ADMIN — BICTEGRAVIR SODIUM, EMTRICITABINE, AND TENOFOVIR ALAFENAMIDE FUMARATE 1 TABLET: 50; 200; 25 TABLET ORAL at 14:15

## 2025-01-05 RX ADMIN — POLYETHYLENE GLYCOL 3350 17 G: 17 POWDER, FOR SOLUTION ORAL at 08:47

## 2025-01-05 RX ADMIN — METHOCARBAMOL TABLETS 500 MG: 500 TABLET, COATED ORAL at 19:56

## 2025-01-05 RX ADMIN — QUETIAPINE FUMARATE 50 MG: 50 TABLET ORAL at 21:37

## 2025-01-05 RX ADMIN — IBUPROFEN 600 MG: 600 TABLET ORAL at 10:13

## 2025-01-05 RX ADMIN — LIDOCAINE 4% 1 PATCH: 40 PATCH TOPICAL at 19:56

## 2025-01-05 RX ADMIN — Medication 5 MG: at 18:37

## 2025-01-05 RX ADMIN — ENOXAPARIN SODIUM 40 MG: 40 INJECTION SUBCUTANEOUS at 19:56

## 2025-01-05 RX ADMIN — HYDROMORPHONE HYDROCHLORIDE 0.2 MG: 0.2 INJECTION, SOLUTION INTRAMUSCULAR; INTRAVENOUS; SUBCUTANEOUS at 10:13

## 2025-01-05 RX ADMIN — ACETAMINOPHEN 975 MG: 325 TABLET, FILM COATED ORAL at 14:10

## 2025-01-05 RX ADMIN — METHOCARBAMOL TABLETS 500 MG: 500 TABLET, COATED ORAL at 14:10

## 2025-01-05 RX ADMIN — OXYCODONE HYDROCHLORIDE 10 MG: 10 TABLET ORAL at 06:18

## 2025-01-05 RX ADMIN — Medication 10 ML: at 14:12

## 2025-01-05 RX ADMIN — PANTOPRAZOLE SODIUM 40 MG: 40 INJECTION, POWDER, FOR SOLUTION INTRAVENOUS at 08:46

## 2025-01-05 RX ADMIN — OXYCODONE HYDROCHLORIDE 5 MG: 5 TABLET ORAL at 14:10

## 2025-01-05 RX ADMIN — OXYCODONE HYDROCHLORIDE 5 MG: 5 TABLET ORAL at 18:37

## 2025-01-05 RX ADMIN — ACETAMINOPHEN 975 MG: 325 TABLET, FILM COATED ORAL at 06:18

## 2025-01-05 RX ADMIN — ACETAMINOPHEN 975 MG: 325 TABLET, FILM COATED ORAL at 21:37

## 2025-01-05 RX ADMIN — SENNOSIDES 2 TABLET: 8.6 TABLET, FILM COATED ORAL at 19:55

## 2025-01-05 RX ADMIN — SENNOSIDES 2 TABLET: 8.6 TABLET, FILM COATED ORAL at 08:47

## 2025-01-05 RX ADMIN — INSULIN GLARGINE 24 UNITS: 100 INJECTION, SOLUTION SUBCUTANEOUS at 08:47

## 2025-01-05 RX ADMIN — SIMETHICONE CHEW TAB 80 MG 80 MG: 80 TABLET ORAL at 08:47

## 2025-01-05 ASSESSMENT — ACTIVITIES OF DAILY LIVING (ADL)
ADLS_ACUITY_SCORE: 39

## 2025-01-05 NOTE — PROVIDER NOTIFICATION
Provider notified (name): Dr. Dusty Bangura  Reason for notification: Pt is crying in the room for IV pain med. Pt was given 5mg Oxycodone @ 2114, one hour later he called crying that pain is getting worse and requesting breakthrough IV Dilaudid  Recommendation/request given to provider: Pt requesting IV dilaudid for breakthrough pain.   Response from provider: Waiting for response/or Orders

## 2025-01-05 NOTE — PROGRESS NOTES
"Pt refused vitals @2100 and told NST that he will only accept vitals signs after he is been given pain medication. At 2105 RN went to pt and asked him to rate his pain and pt got angry and asked RN to \"stop asking questions and bring the pain med\", so RN went pulled 5mg oxycodone and other bedtime medications and brought them to pt. Once pt heard that it was 5mg Oxycodone he became angry and stated that his \"pain is 10/10.\" So RN then explained to pt that the reason he came asked pt to rate his pain was to make sure he brought the right dose for pain, and this further infuriated the pt. So RN offered to go back and return the 5mg in the pyxis and then bring 10mg of Oxycodone. However, pt refused and stated that he did not want to wait and was given the 5mg Oxycodone @ 2114 together with other bedtime medications. About 1 hour later pt called crying that his abd pain was very severe and needed Oxycodone again. RN told pt that the medication was not due and pt became very angry and asked RN to leave the room. About 5 minutes later pt called again and asked for IV dilaudid. RN told pt that he was going to call the provider since there was no active IV dilaudid order. At this moment pt stated that RN is \"a liar\" because he had been getting IV pain medication from other nurses. Pt also became aggressive and violent, and started throwing things at RN, including packet of wipes, urinal, spoons and cup filled with water. At that moment, RN left the room and paged Dr. Bangura for breakthrough IV Dilaudid and MD ordered 0.2mg IV dilaudid. Medication was given by charge nurse and pt became happy and was calm and cooperative throughout the remainder of the shift. This RN also gave pt Oxycodone 10mg with scheduled Tylenol this morning in the presence of another staff as witness and pt accepted the medication with no issues and thanked staff.      "

## 2025-01-05 NOTE — PROGRESS NOTES
"Emergency General Surgery Progress Note     Subjective  Less PO intake. Denies nausea and vomiting. Passing flatus and having BM.     Objective  /70 (BP Location: Left arm)   Pulse 91   Temp 97.9  F (36.6  C) (Oral)   Resp 16   Ht 1.626 m (5' 4\")   Wt 74.9 kg (165 lb 2 oz)   SpO2 96%   BMI 28.34 kg/m      General: Alert, NAD  CV: RRR  Resp: No increased work of breathing on RA  Abd: soft, tender to palpation along wound vac, distension improved,  no peritoneal signs, midline incision with wound vac in place and holding suction.    Ext: WWP    Recent Labs   Lab 01/04/25  1006 01/03/25  0539 01/02/25  0446   WBC 9.4 10.3 10.2   HGB 8.0* 8.0* 8.2*   * 422 340       Recent Labs   Lab 01/04/25  1729 01/04/25  1306 01/04/25  1006 01/03/25  0801 01/03/25  0539 01/03/25  0140 01/03/25  0042 01/02/25  0755 01/02/25  0446   NA  --   --  128*  --  130*  130*  --  128*   < > 128*   POTASSIUM  --   --  4.6  --  4.5  --   --   --  4.4   CHLORIDE  --   --  94*  --  98  --   --   --  96*   CO2  --   --  24  --  24  --   --   --  23   BUN  --   --  19.2  --  19.8  --   --   --  17.7   CR  --   --  0.61*  --  0.64*  --   --   --  0.65*   * 132* 128*   < > 301*   < >  --    < > 290*   LINDA  --   --  7.7*  --  8.2*  --   --   --  7.6*   MAG  --   --  1.9  --  1.9  --   --   --  1.8   PHOS  --   --  3.0  --  3.3  --   --   --  3.2    < > = values in this interval not displayed.       No lab results found in last 7 days.      Recent Labs   Lab 01/02/25  0446 12/30/24  0405   INR 1.30* 1.30*       Recent Labs   Lab 01/02/25  0446 12/30/24  0405   AST 49* 57*   ALT 6 9   ALKPHOS 342* 117   BILITOTAL 0.3 0.7   ALBUMIN 1.9* 1.8*   INR 1.30* 1.30*      Imaging:  No new imaging       A/P: 59yoM with h/o T2DM, HIV on ART, and recent admission from 12/17-12/19 for SBO managed successfully with bowel rest/GGC who presented to ED again on 12/20 for recurrent SBO s/p ex lap/SBR on 12/20. Now s/p ex-lap/SBR/re-anastomosis " for anastomotic leak on 12/24. Extubated 12/26 AM. Now with anterograde bowel function and working to improve oral intake.      POD# 11 - TCU referrals sent out, awaiting placement. Goals for today, encourage more PO intake and continued dispo planning.    - Continue MMPC  - Continue regular diet and Boost supplements  - TPN will to be completed today  - Continue Biktarvy  - Endocrine following for diabetes/insulin management. Appreciate recs  - Continue with wound vac  - Replete electrolytes PRN  - Dispo: TCU referrals sent out, still awaiting placement. Appreciate SW help.      Patient seen, examined and discussed with chief resident and discussed with staff surgeon.     Av Cormier MD  Urology, PGY-1 on EGS Service

## 2025-01-05 NOTE — PROGRESS NOTES
Inpatient Diabetes Management Service : Progress Note     Andrew Collier is a 59 year old with Type 2 Diabetes Mellitus. PMH includes HIV on ART,methamphetamine use,  and recent admission from 12/17-12/19 for SBO managed successfully with bowel rest/GGC who presented to ED again on 12/20/24 for recurrent SBO. Currently  s/p ex lap/SBR on 12/20 and then s/p ex-lap/SBR/re-anastomosis for anastomotic leak on 12/24/24. Extubated 12/26 AM.     Inpatient Diabetes Service consulted for management to get patient off the insulin drip now that he is moved to the med surg floor.         Assessment and Recommendations:       Assessment:     Type 2 Diabetes Mellitus, c/b peripheral neuropathy, gastroparesis. Sub-optimal control. A1c 10.5% (10/23/24)  Abd pain with known hx of gastroparesis (received dx and education 9/25/2023)  Methamphetamine use disorder  Food and housing insecurities resulting in binge eating and poor dietary choices  HIV  Recurrent SBO,  s/p ex lap/SBR on 12/20 and then s/p ex-lap/SBR/re-anastomosis for anastomotic leak on 12/24/24  Stress hyperglycemia (TPN now stopped as of 1/3)  8.   BMI: 29    Plan/Recommendations:      - Lantus 24 units q 24 hrs at 0900  - Novolog Meal Coverage: 1 units per 8 g CHO, TID AC and PRN with snacks/supplements  - Novolog Correction Scale: custom resistance (ISF: 1/35)  TID AC / HS / 0200   - BG monitoring: TID AC, HS, 0200  - Hypoglycemia protocol    - Carb counting protocol     Discussion:      BG now trending closer to target with fasting Bg at 199 mg/dL this AM. Rise over the night as pain increased. Did eat less during day/early evening interval.     Will continue to trend and adjust as indicated.        Per SW:  So far unable to locate a TCU that is able to take the patient.  She believes that he likely will have to stay in the hospital to heal and for rehab.      TENTATIVE Diabetes Management Discharge Plan  Instructions to patient will be  posted in AVS and  discussed on day of discharge.   Medications and supplies are to be ordered by primary service on discharge.   *please use the DIAB non-branded discharge supply order set ([id number])*    Patient will need the following supplies prescribed:   Nothing since going to TCU     Blood glucose monitoring:   Three times daily before meals, and at bedtime.    Blood Glucose (BG) goals:  mg/dL before meals, less than 180 mg/dL 2 hrs after meals.     Glucose Control Regimen:  2) Long-acting insulin: glargine (Lantus) - take  ** units once daily at ** Take at same time each day.     3) Rapid-acting insulin: aspart (Novolog) carbohydrate coverage/mealtime insulin - take 1 unit per ** grams of carbohydrates before meals and snacks.  Take  units before average meals that contain carbohydrates  Take  units before small meals or snacks that contain carbohydrates    4) Rapid-acting insulin: aspart (Novolog) correction - see chart below. Take for high blood glucoses three times daily before meals and at bedtime.   You may add the correction dose to the carbohydrate coverage/mealtime insulin dose and give in one injection--ideally 10-15 minutes before a meal.  You may take the correction dose even if you skip a meal (as long as it has been 4 hours since previous correction dose).       Outpatient Follow-Up: An appointment request was sent to the [facility] coordinator to schedule your outpatient diabetes appointment 1-2 weeks from discharge from the TCU.       Interval History/Review of Systems :   The last 24 hours progress and nursing notes reviewed.       - pain over the noc      - no additional new or acute concerns.     Inpatient Glucose Trends:             Diabetes History:   Diabetes Type and Duration:  T2DM since ~2017 (2015 per A1c 6.7% 10/2015)   GAD65 antibody, zinc transporter 8 antibody, islet antibody, insulin antibody, and c-peptide  not available on epic search     PTA Medication Regimen: Lantus  "27 units bid and novolog 10 units per meal- pharmacist says he was taking lantus 27 units daily and Novlog 10 units with meals.  Last time he was discharged he was discharged on lantus 25 units daily and 10 units of Novolog with meal      Historical Diabetes Medications:    Glipizide ER 10 mg daily --> discontinued 7/25/24, patient not sure why this was discontinued and notes aren't clear.  - Januvia ? - patient doesn't remember taking this, was using last yr per chart review  - Hx mild allergy to GLP-1 (trulicity) rash on chest/arms, legs --> also has hx of gastroparesis and bowel obstruction.   - Metformin, discontinued 2/2 history of side effects of diarrhea   - Jardiance: may have caused wounds on leg (unclear hx) per chart review    Glucose monitoring device/frequency/trends: not using the dexcom, not checking frequently    Outpatient Diabetes Provider:  no-[facility]ocr[facility] 10/10/2023 following hospital admission 9/2023. Patient states he doesn't have a PCP. Established care with internal med MD (Anna Dailey) 2/2024. Follows with pharmacist (Dorie Holland) for Medication Therapy Management, last saw Skyler, 7/25/24.  Has mostly seen his IDS provider since.  Has been in and out of the ER/hospital frequently and has not seen anyone since for his diabetes.        Medications Impacting Glycemia:    Steroids: with surgery, received dex 8 mg x1 and then none since.  No plan to give steroids  D5W containing solutions/medications: None  Other medications impacting glucose: None         Diet/Nutrition:    Orders Placed This Encounter      Regular Diet Adult     Supplements:  none  TPN:  TPN now stopped   started on 12/25/2024: has reached goal on TPN, has 260g of dextrose 1/2/2025 cycle to 12 hours today, same dextrose 180g         Physical Exam:    /75 (BP Location: Left arm)   Pulse 94   Temp 98.9  F (37.2  C) (Oral)   Resp 16   Ht 1.626 m (5' 4\")   Wt 74.9 kg (165 lb 2 oz)   " SpO2 95%   BMI 28.34 kg/m     General:   NAD, laying in bed.  Lungs: unlabored breathing on RA.  ABD:  rounded,  Has large wound on abdomen with wound vac  Skin:  warm and dry, no obvious lesions  MSK:   moving all extremities  Lymp:   no LE edema  Feet: not examined today   Mental Status:  Alert and oriented x3  Psych:   Cooperative, good eye contact, full/appropriate affect         Laboratory Data:      Lab Results   Component Value Date    A1C 10.5 (H) 10/23/2024    A1C 11.7 (H) 07/13/2024    A1C 13.4 (H) 02/06/2024    A1C 10.7 (H) 11/09/2023    A1C 13.3 (H) 08/06/2023    A1C 9.7 (H) 11/11/2020    A1C 8.2 (H) 09/25/2020    A1C 8.2 (H) 08/27/2020    A1C 11.6 (H) 02/11/2020    A1C 12.3 (H) 01/07/2020     ROUTINE IP LABS (Last four results)  BMP  Recent Labs   Lab 01/05/25  0619 01/05/25  0016 01/04/25  1729 01/04/25  1306 01/04/25  1006 01/03/25  0801 01/03/25  0539 01/03/25  0140 01/03/25  0042 01/02/25  2145 01/02/25  1845 01/02/25  0755 01/02/25  0446 01/01/25  0820 01/01/25  0655   NA  --   --   --   --  128*  --  130*  130*  --  128*  --  127*   < > 128*  --  129*   POTASSIUM  --   --   --   --  4.6  --  4.5  --   --   --   --   --  4.4  --  4.3   CHLORIDE  --   --   --   --  94*  --  98  --   --   --   --   --  96*  --  96*   LINDA  --   --   --   --  7.7*  --  8.2*  --   --   --   --   --  7.6*  --  7.7*   CO2  --   --   --   --  24  --  24  --   --   --   --   --  23  --  23   BUN  --   --   --   --  19.2  --  19.8  --   --   --   --   --  17.7  --  15.7   CR  --   --   --   --  0.61*  --  0.64*  --   --   --   --   --  0.65*  --  0.64*   * 231* 116* 132* 128*   < > 301*   < >  --    < >  --    < > 290*   < > 229*    < > = values in this interval not displayed.     CBC  Recent Labs   Lab 01/04/25  1006 01/03/25  0539 01/02/25  0446 01/01/25  0655   WBC 9.4 10.3 10.2 10.9   RBC 2.91* 2.88* 2.91* 3.27*   HGB 8.0* 8.0* 8.2* 9.1*   HCT 24.7* 24.7* 24.6* 27.5*   MCV 85 86 85 84   MCH 27.5 27.8 28.2 27.8    [facility] 32.4 32.4 33.3 33.1   RDW 14.2 14.0 14.3 14.4   * 422 340 306     INR  Recent Labs   Lab 01/02/25  0446 12/30/24  0405   INR 1.30* 1.30*     Liver Function Studies -   Recent Labs   Lab Test 01/02/25  0446   PROTTOTAL 5.8*   ALBUMIN 1.9*   BILITOTAL 0.3   ALKPHOS 342*   AST 49*   ALT 6     Dalila Philippe, APRN-CNP, BC-ADM   Inpatient Diabetes Service  Available on [employer] - when on duty.     To contact Inpatient Diabetes Service:     7 AM - 5 PM: Page the IDS KASSY following the patient that day (see filed or incomplete progress notes/consult notes under Endocrinology)    OR if uncertain of provider assignment: page job code [phone]    5 PM - 7 AM: First call after hours is to primary service.    For urgent after-hours questions, page job code for on call fellow: [phone]     I spent a total of 35 minutes on the date of the encounter doing prep/post-work, chart review, history and exam, documentation and further activities per the note including lab review, multidisciplinary communication, counseling the patient and/or coordinating care regarding acute hyper/hypoglycemic management, as well as discharge management and planning/communication.  See note for details.      Please notify inpatient diabetes service if changes are planned to steroids, nutrition, or if procedures are planned requiring prolonged NPO status.Diabetes Management Team job code: [phone]

## 2025-01-05 NOTE — PROGRESS NOTES
"Pt refused vitals @2105 and told NST that he will only accept vitals signs after he is been given pain medication. At 2110 RN went to pt and asked him to rate his pain and pt got angry and ask RN to bring the pain med, so RN went pulled 5mg oxycodone and other bedtime medications and brought them to pt. Once pt heard that it was 5mg Oxycodone he became angry and stated that his \"pain is 10/10.\" So RN then explained to pt that the reason he came to check rate his pain was to make sure he brought the right dose for his pain, and this further infuriated the pt. So RN offered to go back and return the 5mg in the pyxis and then bring 10mg of Oxycodone. However, pt refused and stated that he did not want to wait and was given the 5mg Oxycodone @ 2114 together with other bedtime medications. About 1 hour later pt called crying that his abd pain was very severe and needed Oxycodone again. RN told pt that the medication was not due and pt became very angry and asked RN to leave the room. About 5 minutes later pt called again and asked for IV dilaudid. RN told pt that he was going to call the provider since there was no active IV dilaudid order. At this moment pt stated that RN is \"a liar\" because he had been getting RN pain medication. Pt also became aggressive and violent, and started throwing things at RN, including packet of wipes, urinal, spoons and cups(with water in those cups). At that moment RN left the    "

## 2025-01-05 NOTE — PROGRESS NOTES
"Emergency General Surgery Progress Note     Subjective  Patient with ongoing pain overnight requiring additional IV Dilaudid doses.  This a.m. he reports his pain is controlled.  Tolerated oral intake yesterday, reports he ate 2 hamburgers however calories not recorded.  Denies nausea or vomiting.  Passing gas however no BM.  Discussed importance of nutrition and calorie intake for wound healing.     Objective  /75 (BP Location: Left arm)   Pulse 94   Temp 98.9  F (37.2  C) (Oral)   Resp 16   Ht 1.626 m (5' 4\")   Wt 74.9 kg (165 lb 2 oz)   SpO2 95%   BMI 28.34 kg/m      General: Alert, NAD  CV: RRR  Resp: No increased work of breathing on RA  Abd: soft, tender to palpation along wound vac, ongoing mild distention, midline incision with wound vac in place and holding suction.    Ext: WWP    Recent Labs   Lab 01/05/25  0728 01/04/25  1006 01/03/25  0539   WBC 9.1 9.4 10.3   HGB 8.0* 8.0* 8.0*   * 553* 422       Recent Labs   Lab 01/05/25  0844 01/05/25  0728 01/05/25  0619 01/04/25  1306 01/04/25  1006 01/03/25  0801 01/03/25  0539 01/02/25  0755 01/02/25  0446   NA  --  131*  --   --  128*  --  130*  130*   < > 128*   POTASSIUM  --  4.4  --   --  4.6  --  4.5  --  4.4   CHLORIDE  --  98  --   --  94*  --  98  --  96*   CO2  --  24  --   --  24  --  24  --  23   BUN  --  18.6  --   --  19.2  --  19.8  --  17.7   CR  --  0.68  --   --  0.61*  --  0.64*  --  0.65*   * 216* 213*   < > 128*   < > 301*   < > 290*   LINDA  --  7.5*  --   --  7.7*  --  8.2*  --  7.6*   MAG  --   --   --   --  1.9  --  1.9  --  1.8   PHOS  --   --   --   --  3.0  --  3.3  --  3.2    < > = values in this interval not displayed.       No lab results found in last 7 days.      Recent Labs   Lab 01/02/25 0446 12/30/24  0405   INR 1.30* 1.30*       Recent Labs   Lab 01/02/25 0446 12/30/24  0405   AST 49* 57*   ALT 6 9   ALKPHOS 342* 117   BILITOTAL 0.3 0.7   ALBUMIN 1.9* 1.8*   INR 1.30* 1.30*      Imaging:  No new " imaging       A/P: 59yoM with h/o T2DM, HIV on ART, and recent admission from 12/17-12/19 for SBO managed successfully with bowel rest/GGC who presented to ED again on 12/20 for recurrent SBO s/p ex lap/SBR on 12/20. Now s/p ex-lap/SBR/re-anastomosis for anastomotic leak on 12/24. Extubated 12/26 AM. Now with anterograde bowel function and working to improve oral intake.      POD# 12 - TCU referrals sent out, awaiting placement.  Encouraged more p.o. intake, continue discharge planning.  Scheduled Robaxin added for additional pain control    - Continue MMPC  - Continue regular diet and Boost supplements  - TPN will to be completed today  - Continue Biktarvy  - Endocrine following for diabetes/insulin management. Appreciate recs  - Continue with wound vac  - Replete electrolytes PRN  - Dispo: TCU referrals sent out, still awaiting placement. Appreciate SW help.      Patient seen, examined and discussed with chief resident and discussed with staff surgeon.     JULIET Sam MAR  PGY-1  General Surgery

## 2025-01-06 ENCOUNTER — APPOINTMENT (OUTPATIENT)
Dept: PHYSICAL THERAPY | Facility: CLINIC | Age: 60
End: 2025-01-06
Payer: COMMERCIAL

## 2025-01-06 LAB
ALBUMIN SERPL BCG-MCNC: 2.3 G/DL (ref 3.5–5.2)
ALP SERPL-CCNC: 424 U/L (ref 40–150)
ALT SERPL W P-5'-P-CCNC: 11 U/L (ref 0–70)
ANION GAP SERPL CALCULATED.3IONS-SCNC: 9 MMOL/L (ref 7–15)
AST SERPL W P-5'-P-CCNC: 35 U/L (ref 0–45)
BILIRUB SERPL-MCNC: 0.3 MG/DL
BUN SERPL-MCNC: 16.1 MG/DL (ref 8–23)
CALCIUM SERPL-MCNC: 7.7 MG/DL (ref 8.8–10.4)
CHLORIDE SERPL-SCNC: 98 MMOL/L (ref 98–107)
CREAT SERPL-MCNC: 0.67 MG/DL (ref 0.67–1.17)
EGFRCR SERPLBLD CKD-EPI 2021: >90 ML/MIN/1.73M2
ERYTHROCYTE [DISTWIDTH] IN BLOOD BY AUTOMATED COUNT: 14.3 % (ref 10–15)
GLUCOSE BLDC GLUCOMTR-MCNC: 109 MG/DL (ref 70–99)
GLUCOSE BLDC GLUCOMTR-MCNC: 115 MG/DL (ref 70–99)
GLUCOSE BLDC GLUCOMTR-MCNC: 136 MG/DL (ref 70–99)
GLUCOSE BLDC GLUCOMTR-MCNC: 140 MG/DL (ref 70–99)
GLUCOSE BLDC GLUCOMTR-MCNC: 178 MG/DL (ref 70–99)
GLUCOSE SERPL-MCNC: 137 MG/DL (ref 70–99)
HCO3 SERPL-SCNC: 24 MMOL/L (ref 22–29)
HCT VFR BLD AUTO: 23.7 % (ref 40–53)
HGB BLD-MCNC: 7.8 G/DL (ref 13.3–17.7)
INR PPP: 1.3 (ref 0.85–1.15)
MAGNESIUM SERPL-MCNC: 1.9 MG/DL (ref 1.7–2.3)
MCH RBC QN AUTO: 27.9 PG (ref 26.5–33)
MCHC RBC AUTO-ENTMCNC: 32.9 G/DL (ref 31.5–36.5)
MCV RBC AUTO: 85 FL (ref 78–100)
PHOSPHATE SERPL-MCNC: 2.9 MG/DL (ref 2.5–4.5)
PLATELET # BLD AUTO: 596 10E3/UL (ref 150–450)
POTASSIUM SERPL-SCNC: 4.2 MMOL/L (ref 3.4–5.3)
PREALB SERPL-MCNC: 7.2 MG/DL (ref 20–40)
PROT SERPL-MCNC: 6.5 G/DL (ref 6.4–8.3)
RBC # BLD AUTO: 2.8 10E6/UL (ref 4.4–5.9)
SODIUM SERPL-SCNC: 131 MMOL/L (ref 135–145)
TRIGL SERPL-MCNC: 137 MG/DL
WBC # BLD AUTO: 10.2 10E3/UL (ref 4–11)

## 2025-01-06 PROCEDURE — 85027 COMPLETE CBC AUTOMATED: CPT

## 2025-01-06 PROCEDURE — 99232 SBSQ HOSP IP/OBS MODERATE 35: CPT | Mod: 24 | Performed by: SURGERY

## 2025-01-06 PROCEDURE — 250N000013 HC RX MED GY IP 250 OP 250 PS 637: Performed by: STUDENT IN AN ORGANIZED HEALTH CARE EDUCATION/TRAINING PROGRAM

## 2025-01-06 PROCEDURE — G0463 HOSPITAL OUTPT CLINIC VISIT: HCPCS

## 2025-01-06 PROCEDURE — 84100 ASSAY OF PHOSPHORUS: CPT | Performed by: SURGERY

## 2025-01-06 PROCEDURE — 250N000013 HC RX MED GY IP 250 OP 250 PS 637: Performed by: NURSE PRACTITIONER

## 2025-01-06 PROCEDURE — 84478 ASSAY OF TRIGLYCERIDES: CPT | Performed by: SURGERY

## 2025-01-06 PROCEDURE — 999N000007 HC SITE CHECK

## 2025-01-06 PROCEDURE — 36592 COLLECT BLOOD FROM PICC: CPT | Performed by: SURGERY

## 2025-01-06 PROCEDURE — 97110 THERAPEUTIC EXERCISES: CPT | Mod: GP

## 2025-01-06 PROCEDURE — 250N000013 HC RX MED GY IP 250 OP 250 PS 637

## 2025-01-06 PROCEDURE — 85610 PROTHROMBIN TIME: CPT | Performed by: SURGERY

## 2025-01-06 PROCEDURE — 250N000011 HC RX IP 250 OP 636

## 2025-01-06 PROCEDURE — 250N000013 HC RX MED GY IP 250 OP 250 PS 637: Performed by: SURGERY

## 2025-01-06 PROCEDURE — 83735 ASSAY OF MAGNESIUM: CPT | Performed by: SURGERY

## 2025-01-06 PROCEDURE — 120N000002 HC R&B MED SURG/OB UMMC

## 2025-01-06 PROCEDURE — 99232 SBSQ HOSP IP/OBS MODERATE 35: CPT | Performed by: PHYSICIAN ASSISTANT

## 2025-01-06 PROCEDURE — 84134 ASSAY OF PREALBUMIN: CPT | Performed by: SURGERY

## 2025-01-06 PROCEDURE — 250N000011 HC RX IP 250 OP 636: Performed by: STUDENT IN AN ORGANIZED HEALTH CARE EDUCATION/TRAINING PROGRAM

## 2025-01-06 PROCEDURE — 250N000009 HC RX 250: Performed by: NURSE PRACTITIONER

## 2025-01-06 PROCEDURE — 84450 TRANSFERASE (AST) (SGOT): CPT | Performed by: SURGERY

## 2025-01-06 PROCEDURE — 82310 ASSAY OF CALCIUM: CPT | Performed by: SURGERY

## 2025-01-06 RX ORDER — MAGNESIUM OXIDE 400 MG/1
400 TABLET ORAL EVERY 4 HOURS
Status: COMPLETED | OUTPATIENT
Start: 2025-01-06 | End: 2025-01-06

## 2025-01-06 RX ADMIN — POLYETHYLENE GLYCOL 3350 17 G: 17 POWDER, FOR SOLUTION ORAL at 08:56

## 2025-01-06 RX ADMIN — Medication 5 MG: at 18:25

## 2025-01-06 RX ADMIN — MAGNESIUM OXIDE TAB 400 MG (241.3 MG ELEMENTAL MG) 400 MG: 400 (241.3 MG) TAB at 08:47

## 2025-01-06 RX ADMIN — PANTOPRAZOLE SODIUM 40 MG: 40 INJECTION, POWDER, FOR SOLUTION INTRAVENOUS at 08:46

## 2025-01-06 RX ADMIN — MAGNESIUM OXIDE TAB 400 MG (241.3 MG ELEMENTAL MG) 400 MG: 400 (241.3 MG) TAB at 12:09

## 2025-01-06 RX ADMIN — HYDROMORPHONE HYDROCHLORIDE 0.2 MG: 0.2 INJECTION, SOLUTION INTRAMUSCULAR; INTRAVENOUS; SUBCUTANEOUS at 22:48

## 2025-01-06 RX ADMIN — HYDROMORPHONE HYDROCHLORIDE 0.2 MG: 0.2 INJECTION, SOLUTION INTRAMUSCULAR; INTRAVENOUS; SUBCUTANEOUS at 10:22

## 2025-01-06 RX ADMIN — LIDOCAINE 4% 1 PATCH: 40 PATCH TOPICAL at 20:43

## 2025-01-06 RX ADMIN — ACETAMINOPHEN 975 MG: 325 TABLET, FILM COATED ORAL at 13:49

## 2025-01-06 RX ADMIN — INSULIN GLARGINE 24 UNITS: 100 INJECTION, SOLUTION SUBCUTANEOUS at 08:45

## 2025-01-06 RX ADMIN — Medication 10 ML: at 12:09

## 2025-01-06 RX ADMIN — METHOCARBAMOL TABLETS 500 MG: 500 TABLET, COATED ORAL at 20:43

## 2025-01-06 RX ADMIN — ACETAMINOPHEN 975 MG: 325 TABLET, FILM COATED ORAL at 05:58

## 2025-01-06 RX ADMIN — BICTEGRAVIR SODIUM, EMTRICITABINE, AND TENOFOVIR ALAFENAMIDE FUMARATE 1 TABLET: 50; 200; 25 TABLET ORAL at 13:48

## 2025-01-06 RX ADMIN — OXYCODONE HYDROCHLORIDE 10 MG: 10 TABLET ORAL at 06:02

## 2025-01-06 RX ADMIN — METHOCARBAMOL TABLETS 500 MG: 500 TABLET, COATED ORAL at 13:48

## 2025-01-06 RX ADMIN — ENOXAPARIN SODIUM 40 MG: 40 INJECTION SUBCUTANEOUS at 20:42

## 2025-01-06 RX ADMIN — OXYCODONE HYDROCHLORIDE 10 MG: 10 TABLET ORAL at 20:41

## 2025-01-06 RX ADMIN — QUETIAPINE FUMARATE 50 MG: 50 TABLET ORAL at 21:39

## 2025-01-06 RX ADMIN — METHOCARBAMOL TABLETS 500 MG: 500 TABLET, COATED ORAL at 08:47

## 2025-01-06 RX ADMIN — SENNOSIDES 2 TABLET: 8.6 TABLET, FILM COATED ORAL at 20:43

## 2025-01-06 RX ADMIN — Medication 5 ML: at 05:36

## 2025-01-06 RX ADMIN — SENNOSIDES 2 TABLET: 8.6 TABLET, FILM COATED ORAL at 08:47

## 2025-01-06 RX ADMIN — ACETAMINOPHEN 975 MG: 325 TABLET, FILM COATED ORAL at 21:39

## 2025-01-06 RX ADMIN — OXYCODONE HYDROCHLORIDE 10 MG: 10 TABLET ORAL at 15:40

## 2025-01-06 ASSESSMENT — ACTIVITIES OF DAILY LIVING (ADL)
ADLS_ACUITY_SCORE: 39

## 2025-01-06 NOTE — PLAN OF CARE
Goal Outcome Evaluation:      Plan of Care Reviewed With: patient    Overall Patient Progress: no changeOverall Patient Progress: no change    Outcome Evaluation: Continue with POC    No acute changes overnight. Aox4, VSS on RA. Calm and cooperative this shift. Was able to sleep most of the night after night meds given. PRN oxy given 1x. Wound vac in place. Voiding spontaneously in urinal. Call light in reach and able to make needs known

## 2025-01-06 NOTE — PROGRESS NOTES
Care Management Follow Up    Length of Stay (days): 17  Expected Discharge Date: 01/07/2025  Concerns to be Addressed: discharge planning     Patient plan of care discussed at interdisciplinary rounds: Yes  Anticipated Discharge Disposition: Transitional Care  Anticipated Discharge Services: None  Anticipated Discharge DME: None  Patient/family educated on Medicare website which has current facility and service quality ratings: yes  Education Provided on the Discharge Plan: Yes  Patient/Family in Agreement with the Plan: yes  Referrals Placed by CM/SW: Post Acute Facilities    Active Referrals:    [facility]  (Main P: [phone], Admissions P: [phone], F: [phone])   1/6: Initial SNF referral sent.     Inactive Referrals:  [facility] Global Referral - Global denial d/t IV drug use & need for a PICC & TPN.  [facility] Global Referral - Global denial d/t the pt's TOMMY/CD treatment needs  [facility]- cannot meet patient's needs   [facility] Global Referral- cannot meet patient's needs, history of violence and/or drug use or alcohol abuse   [facility]- pt no longer LTACH appropriate  [facility] LTACH- pt no longer LTACH appropriate    Private pay costs discussed: Not applicable  Discussed  Partnership in Safe Discharge Planning  document with patient/family: Yes   Handoff Completed: No, handoff not indicated or clinically appropriate    Additional Information:   tasked with following up with TCU referrals for safe discharge disposition for  patient.  SW noted that patient has been globally denied from all facilities with a history of accepting patient's with complex psychosocial needs/barriers.  SW placed one additional referral to [facility] to exhaust all TCU options.     Next Steps:   Social work will escalate case to leadership via SWAT for discharge disposition assistance.   __________________________     RADHA Larson,  LGSW   [facility]  7C Medical Surgical Beds 9543[address]8599   Desk Phone: [phone]   Securely message with [employer] (Search Name or 7C Med Surg [id number] SW)  Text page via [employer] Paging/Directory   See signed in provider for up to date coverage information  Social Work & Care Management Department  ___________________________________    Unit 7C Care Management Weekend Contacts:    Searchable in [employer] - search SOCIAL WORK or CARE COORDINATOR  Searchable in [employer] - search 7C Med Surg SW, 7C Med Surg RNCC     7C Weekend SW Vocera; (0306-5649)  7C Weekend RNCC Vocera; ([phone])  After hours  Vocera;  (Weekends (1630 - 0000); Mon-Fri (0607-1623); [facility] Recognized Holidays  (3230-4303))            RADHA CAGLE

## 2025-01-06 NOTE — PROGRESS NOTES
[facility]  WO Nurse Inpatient Assessment     Consulted for: Midline incision, consult for vac placement    Patient History (according to provider note(s):      59yoM with h/o T2DM, HIV on ART, and recent admission from 12/17-12/19 for SBO managed successfully with bowel rest/GGC who presented to ED again on 12/20 for recurrent SBO s/p ex lap/SBR on 12/20. Now s/p ex-lap/SBR/re-anastomosis for anastomotic leak on 12/24. Extubated 12/26 AM.     Assessment:      Areas visualized during today's visit: Abdomen    Negative pressure wound therapy applied to: midline abdomen    Last photo: 1/6   Wound due to: Surgical Wound   Wound history/plan of care:    Surgical date: 12/24   Service following: Gen surgery   Date Negative Pressure Wound Therapy initiated: 12/30   Interventions in place: N/A  Is patient s nutritional status compromised? no   If yes, what interventions are in place? N/A  Reason for initiating vac therapy? Presence of co-morbidities, High risk of infections, and Need for accelerated granulation tissue  Which?of?the?following?co-morbidities?apply? Diabetes  If diabetic is patient on a diabetic management program? Yes   Is osteomyelitis present in wound? no   If yes what treatments are in place? N/A    Wound base: 80 % marbled Granulation tissue and Muscle, 20% fibrin vs slough. Wound now with exposed sutures in the superior 1/2 of the wound with wound break down present in the wound base. Intact fascia in place under the exposed sutures     Palpation of the wound bed: normal       Drainage: moderate      Volume in cannister: 100     Last cannister change date: 12/30 new canister      Description of drainage: serosanguinous      Measurements (length x width x depth, in cm) 19  x 3.5  x  2.5 cm       Tunneling N/A      Undermining up to 0.5 cm at 12 o'clock  Periwound skin: Intact       Color: normal and consistent with surrounding tissue       Temperature:  normal    Odor: none   Pain: moderate, aching and tender   Pain intervention prior to dressing change: oral oxycodone, IV dilaudid, and slow and gentle cares   Treatment goal: Increase granulation and Protection  STATUS: deteriorating and granulating   Supplies ordered: gathered and at bedside    Number of foam pieces removed from a wound (excluding foam for bridge) :  1 GranuFoam Black and 1 Oil emulsion dressing   Verified this matched the number of foam pieces applied last dressing change: yes   Number of foam pieces packed into wound (excluding foam for bridge) : wound vac discontinued       Treatment Plan:     Midline wound: BID gently cleanse wound with Vashe (#[id number]) moistened gauze and pat dry. Apply no sting barrier film to tammy-wound area and allow to dry. Moisten kerlix with Vashe and gently pack into wound. Cover with ABD pad and secure with Medipore tape.     Orders: Updated    RECOMMEND PRIMARY TEAM ORDER: None, at this time  Education provided: plan of care  Discussed plan of care with: Patient and Nurse  WOC nurse follow-up plan: weekly  Notify WOC if wound(s) deteriorate.  Nursing to notify the Provider(s) and re-consult the WOC Nurse if new skin concern.    DATA:     Current support surface: Standard  Standard gel mattress (Isoflex)  Containment of urine/stool: Incontinence Protocol  BMI: Body mass index is 28.34 kg/m .   Active diet order: Orders Placed This Encounter      Regular Diet Adult     Output: I/O last 3 completed shifts:  In: 907 [P.O.:900; I.V.:7]  Out: 1050 [Urine:1050]     Labs:   Recent Labs   Lab 01/06/25  0543   ALBUMIN 2.3*   PREALB 7.2*   HGB 7.8*   INR 1.30*   WBC 10.2     Pressure injury risk assessment:   Sensory Perception: 3-->slightly limited  Moisture: 3-->occasionally moist  Activity: 3-->walks occasionally  Mobility: 3-->slightly limited  Nutrition: 3-->adequate  Friction and Shear: 3-->no apparent problem  Estevan Score: 18    Prince Queen RN, CWOCN  Pager no longer  in use, please contact through Spotie group: [facility] Nurse [place]    Dept. Office Number: [phone]

## 2025-01-06 NOTE — PROGRESS NOTES
"Emergency General Surgery Progress Note   01/06/2025    Subjective  Patient his pain is better unless someone presses on his abdomen. Has been tolerating oral intake, but appetite low. Denies nausea or vomiting.  Passing gas and had one BM yesterday.     Objective  /71 (BP Location: Left arm, Patient Position: Semi-Post's, Cuff Size: Adult Regular)   Pulse 86   Temp 97.8  F (36.6  C) (Oral)   Resp 18   Ht 1.626 m (5' 4\")   Wt 74.9 kg (165 lb 2 oz)   SpO2 96%   BMI 28.34 kg/m      General: Alert, NAD  CV: RRR  Resp: No increased work of breathing on RA  Abd: soft, tender to palpation along wound vac, ongoing mild distention, midline incision with wound vac in place and holding suction.    Ext: WWP    Recent Labs   Lab 01/06/25  0543 01/05/25  0728 01/04/25  1006   WBC 10.2 9.1 9.4   HGB 7.8* 8.0* 8.0*   * 584* 553*       Recent Labs   Lab 01/06/25  0759 01/06/25  0543 01/06/25  0131 01/05/25  0844 01/05/25  0728 01/04/25  1306 01/04/25  1006 01/03/25  0801 01/03/25  0539   NA  --  131*  --   --  131*  --  128*  --  130*  130*   POTASSIUM  --  4.2  --   --  4.4  --  4.6  --  4.5   CHLORIDE  --  98  --   --  98  --  94*  --  98   CO2  --  24  --   --  24  --  24  --  24   BUN  --  16.1  --   --  18.6  --  19.2  --  19.8   CR  --  0.67  --   --  0.68  --  0.61*  --  0.64*   * 137* 178*   < > 216*   < > 128*   < > 301*   LINDA  --  7.7*  --   --  7.5*  --  7.7*  --  8.2*   MAG  --  1.9  --   --   --   --  1.9  --  1.9   PHOS  --  2.9  --   --   --   --  3.0  --  3.3    < > = values in this interval not displayed.       No lab results found in last 7 days.      Recent Labs   Lab 01/06/25  0543 01/02/25  0446   INR 1.30* 1.30*       Recent Labs   Lab 01/06/25 0543 01/02/25 0446   AST 35 49*   ALT 11 6   ALKPHOS 424* 342*   BILITOTAL 0.3 0.3   ALBUMIN 2.3* 1.9*   INR 1.30* 1.30*      Imaging:  No new imaging in the last 24 hours.      A/P: 59yoM with h/o T2DM, HIV on ART, and recent admission " from 12/17-12/19 for SBO managed successfully with bowel rest/GGC who presented to ED again on 12/20 for recurrent SBO s/p ex lap/SBR on 12/20. Now s/p ex-lap/SBR/re-anastomosis for anastomotic leak on 12/24. Extubated 12/26 AM. Now with anterograde bowel function and working to improve oral intake.      POD# 13 - TCU referrals sent out, awaiting placement.  Encouraged more p.o. intake, continue discharge planning.    - Continue PO pain control  - Continue regular diet and Boost supplements  - Continue Biktarvy  - Endocrine following for diabetes/insulin management. Appreciate recs  - Seen with WOCN, vac discontinued. Will do wet to dry dressings  - Replete electrolytes PRN  - Dispo: TCU referrals sent out, still awaiting placement. Appreciate SW help.      Patient seen, examined and discussed with resident team and discussed with staff surgeon.     Viv Angel, MS3  [facility] [employer]      -----    I saw the patient with the medical student and agree with the findings, assessment, and plan documented above. Edits made where indicated    Discussed with Dr. Humble Valentine MD PGY5  [facility] General Surgery Resident

## 2025-01-06 NOTE — PROGRESS NOTES
Vascular Access Services Notes:    VAS RN rounding on PICC done. Site observed, dressing appears clean, dry & intact. No further intervention needed at this time. VAS will continue to follow as needed.      Girish Olguin, BSN, RN [facility]-BC  Vascular Access Services  [facility]  [phone]     Neurosurgery Rad Onc Palliative Care Heme/Onc

## 2025-01-06 NOTE — PROGRESS NOTES
Inpatient Diabetes Management Service : Progress Note     Andrew Collier is a 59 year old with Type 2 Diabetes Mellitus. PMH includes HIV on ART,methamphetamine use,  and recent admission from 12/17-12/19 for SBO managed successfully with bowel rest/GGC who presented to ED again on 12/20/24 for recurrent SBO. Currently  s/p ex lap/SBR on 12/20 and then s/p ex-lap/SBR/re-anastomosis for anastomotic leak on 12/24/24. Extubated 12/26 AM.     Inpatient Diabetes Service consulted for management to get patient off the insulin drip now that he is moved to the med surg floor.         Assessment and Recommendations:       Assessment:     Type 2 Diabetes Mellitus, complicated by peripheral neuropathy, gastroparesis. Sub-optimal control. A1c 10.5% (10/23/24)  Abd pain with known hx of gastroparesis (received dx and education 9/25/2023)  post surgery  Methamphetamine use disorder  Food and housing insecurities resulting in binge eating and poor dietary choices  HIV  Recurrent SBO,  s/p ex lap/SBR on 12/20 and then s/p ex-lap/SBR/re-anastomosis for anastomotic leak on 12/24/24  Stress hyperglycemia (TPN  stopped as of 1/3)  8.   BMI: 29    Plan/Recommendations:      - Continue Lantus 24 units q 24 hrs at 0900   -  Continue Novolog Meal Coverage: 1 units per 8 g CHO, TID AC and PRN with snacks/supplements  - Novolog Correction Scale: custom resistance (ISF: 1/35)  TID AC / HS / 0200   - BG monitoring: TID AC, HS, 0200  - Hypoglycemia protocol    - Carb counting protocol     Discussion:    OOQ=188 so no change to lantus.  No change to correction or carb coverage.        Per SW:  So far unable to locate a TCU that is able to take the patient.  She believes that he likely will have to stay in the hospital to heal and for rehab.      TENTATIVE Diabetes Management Discharge Plan  Instructions to patient will be  posted in AVS and discussed on day of discharge.   Medications and supplies are to be ordered by primary service  on discharge.   *please use the DIAB non-branded discharge supply order set ([id number])*    Patient will need the following supplies prescribed:   Nothing since going to TCU     Blood glucose monitoring:   Three times daily before meals, and at bedtime.    Blood Glucose (BG) goals:  mg/dL before meals, less than 180 mg/dL 2 hrs after meals.     Glucose Control Regimen:  2) Long-acting insulin: glargine (Lantus) - take  ** units once daily at ** Take at same time each day.     3) Rapid-acting insulin: aspart (Novolog) carbohydrate coverage/mealtime insulin - take 1 unit per ** grams of carbohydrates before meals and snacks.  Take  units before average meals that contain carbohydrates  Take  units before small meals or snacks that contain carbohydrates    4) Rapid-acting insulin: aspart (Novolog) correction - see chart below. Take for high blood glucoses three times daily before meals and at bedtime.   You may add the correction dose to the carbohydrate coverage/mealtime insulin dose and give in one injection--ideally 10-15 minutes before a meal.  You may take the correction dose even if you skip a meal (as long as it has been 4 hours since previous correction dose).       Outpatient Follow-Up: An appointment request was sent to the [facility] coordinator to schedule your outpatient diabetes appointment 1-2 weeks from discharge from the TCU.       Interval History/Review of Systems :   The last 24 hours progress and nursing notes reviewed.     - no new or acute concerns. He says he is not eating. He does sometimes eat or drink and not get carb coverage which I suspect FY=607 at bedtime.  He is concerned with abdominal pain and says it is not the surgery or gastroparesis. No n,v,d.  Creat=0.67, GFR >90  Inpatient Glucose Trends:             Diabetes History:   Diabetes Type and Duration:  T2DM since ~2017 (2015 per A1c 6.7% 10/2015)   GAD65 antibody, zinc transporter 8 antibody, islet antibody,  insulin antibody, and c-peptide  not available on epic search     PTA Medication Regimen: Lantus 27 units bid and novolog 10 units per meal- pharmacist says he was taking lantus 27 units daily and Novlog 10 units with meals.  Last time he was discharged he was discharged on lantus 25 units daily and 10 units of Novolog with meal      Historical Diabetes Medications:    Glipizide ER 10 mg daily --> discontinued 7/25/24, patient not sure why this was discontinued and notes aren't clear.  - Januvia ? - patient doesn't remember taking this, was using last yr per chart review  - Hx mild allergy to GLP-1 (trulicity) rash on chest/arms, legs --> also has hx of gastroparesis and bowel obstruction.   - Metformin, discontinued 2/2 history of side effects of diarrhea   - Jardiance: may have caused wounds on leg (unclear hx) per chart review    Glucose monitoring device/frequency/trends: not using the dexcom, not checking frequently    Outpatient Diabetes Provider:  no-[facility] endocr[facility]ology visit 10/10/2023 following hospital admission 9/2023. Patient states he doesn't have a PCP. Established care with internal med MD (Anna Dailey) 2/2024. Follows with pharmacist (Dorie Holland) for Medication Therapy Management, last saw Skyler, 7/25/24.  Has mostly seen his IDS provider since.  Has been in and out of the ER/hospital frequently and has not seen anyone since for his diabetes.        Medications Impacting Glycemia:    Steroids: with surgery, received dex 8 mg x1 and then none since.  No plan to give more steroids  D5W containing solutions/medications: None  Other medications impacting glucose: None         Diet/Nutrition:    Orders Placed This Encounter      Regular Diet Adult     Supplements:  none  TPN:  TPN stopped.  Last dose on 1/2/2024 at 8 pm   started on 12/25/2024: has reached goal on TPN, has 260g of dextrose 1/2/2025 cycle to 12 hours today, same dextrose 180g           Physical Exam:    /71 (BP  "Location: Left arm, Patient Position: Semi-Post's, Cuff Size: Adult Regular)   Pulse 86   Temp 97.8  F (36.6  C) (Oral)   Resp 18   Ht 1.626 m (5' 4\")   Wt 74.9 kg (165 lb 2 oz)   SpO2 96%   BMI 28.34 kg/m     General:   NAD, laying in bed.  Lungs: unlabored breathing on RA.  ABD:  rounded,  Has large wound on abdomen with wound vac  Skin:  warm and dry, no obvious lesions  MSK:   moving all extremities  Lymp:   no LE edema  Feet: not examined today   Mental Status:  Alert and oriented x3  Psych:   Cooperative, good eye contact, full/appropriate affect         Laboratory Data:      Lab Results   Component Value Date    A1C 10.5 (H) 10/23/2024    A1C 11.7 (H) 07/13/2024    A1C 13.4 (H) 02/06/2024    A1C 10.7 (H) 11/09/2023    A1C 13.3 (H) 08/06/2023    A1C 9.7 (H) 11/11/2020    A1C 8.2 (H) 09/25/2020    A1C 8.2 (H) 08/27/2020    A1C 11.6 (H) 02/11/2020    A1C 12.3 (H) 01/07/2020     ROUTINE IP LABS (Last four results)  BMP  Recent Labs   Lab 01/06/25  0759 01/06/25  0543 01/06/25  0131 01/05/25  2136 01/05/25  0844 01/05/25  0728 01/04/25  1306 01/04/25  1006 01/03/25  0801 01/03/25  0539   NA  --  131*  --   --   --  131*  --  128*  --  130*  130*   POTASSIUM  --  4.2  --   --   --  4.4  --  4.6  --  4.5   CHLORIDE  --  98  --   --   --  98  --  94*  --  98   LINDA  --  7.7*  --   --   --  7.5*  --  7.7*  --  8.2*   CO2  --  24  --   --   --  24  --  24  --  24   BUN  --  16.1  --   --   --  18.6  --  19.2  --  19.8   CR  --  0.67  --   --   --  0.68  --  0.61*  --  0.64*   * 137* 178* 201*   < > 216*   < > 128*   < > 301*    < > = values in this interval not displayed.     CBC  Recent Labs   Lab 01/06/25  0543 01/05/25  0728 01/04/25  1006 01/03/25  0539   WBC 10.2 9.1 9.4 10.3   RBC 2.80* 2.92* 2.91* 2.88*   HGB 7.8* 8.0* 8.0* 8.0*   HCT 23.7* 24.8* 24.7* 24.7*   MCV 85 85 85 86   MCH 27.9 27.4 27.5 27.8   MCHC 32.9 32.3 32.4 32.4   RDW 14.3 14.2 14.2 14.0   * 584* 553* 422     INR  Recent " Labs   Lab 01/06/25  0543 01/02/25  0446   INR 1.30* 1.30*     Lab Results   Component Value Date    AST 49 01/02/2025    AST 12 06/24/2021     Lab Results   Component Value Date    ALT 6 01/02/2025    ALT 20 06/24/2021     Kate Quintero PA-C  Inpatient Diabetes Service  [phone]  Available by [employer]  Date of Service: 1/6/2025  .     To contact Inpatient Diabetes Service:     7 AM - 5 PM: Page the Outline KASSY following the patient that day (see filed or incomplete progress notes/consult notes under Endocrinology)    OR if uncertain of provider assignment: page job code [phone]    5 PM - 7 AM: First call after hours is to primary service.    For urgent after-hours questions, page job code for on call fellow: [phone]     I spent a total of 35 minutes on the date of the encounter doing prep/post-work, chart review, history and exam, documentation and further activities per the note including lab review, multidisciplinary communication, counseling the patient and/or coordinating care regarding acute hyper/hypoglycemic management, as well as discharge management and planning/communication.  See note for details.

## 2025-01-06 NOTE — PLAN OF CARE
Goal Outcome Evaluation:           Overall Patient Progress: improvingOverall Patient Progress: improving    Outcome Evaluation: blood sugars in the 140's most of day.  pateint has good appetite and drinking fluids well. refusing to sit in chair refusing to use bathroom. educated patient importance of getting up to chair and moving around more then laying in bed in all day. patient wound vac in place. surgery put in roboxin today. oxy 5 every 4 to 5 hours.  continue to follow plan of care

## 2025-01-06 NOTE — PLAN OF CARE
Follow up in 3 months with repeat iron studies    Goal Outcome Evaluation:    -diagnostic tests and consults completed and resulted: met   -vital signs normal or at patient baseline: met   -tolerating oral intake to maintain hydration: met   -adequate pain control on oral analgesics: met    -tolerating oral antibiotics or has plans for home infusion setup: not met    -infection is improving: not met

## 2025-01-07 LAB
ANION GAP SERPL CALCULATED.3IONS-SCNC: 8 MMOL/L (ref 7–15)
BUN SERPL-MCNC: 18.3 MG/DL (ref 8–23)
CALCIUM SERPL-MCNC: 8 MG/DL (ref 8.8–10.4)
CHLORIDE SERPL-SCNC: 97 MMOL/L (ref 98–107)
CREAT SERPL-MCNC: 0.63 MG/DL (ref 0.67–1.17)
EGFRCR SERPLBLD CKD-EPI 2021: >90 ML/MIN/1.73M2
ERYTHROCYTE [DISTWIDTH] IN BLOOD BY AUTOMATED COUNT: 14.1 % (ref 10–15)
GLUCOSE BLDC GLUCOMTR-MCNC: 123 MG/DL (ref 70–99)
GLUCOSE BLDC GLUCOMTR-MCNC: 153 MG/DL (ref 70–99)
GLUCOSE BLDC GLUCOMTR-MCNC: 159 MG/DL (ref 70–99)
GLUCOSE BLDC GLUCOMTR-MCNC: 209 MG/DL (ref 70–99)
GLUCOSE SERPL-MCNC: 139 MG/DL (ref 70–99)
HCO3 SERPL-SCNC: 24 MMOL/L (ref 22–29)
HCT VFR BLD AUTO: 25.2 % (ref 40–53)
HGB BLD-MCNC: 8.2 G/DL (ref 13.3–17.7)
MAGNESIUM SERPL-MCNC: 1.8 MG/DL (ref 1.7–2.3)
MCH RBC QN AUTO: 27.4 PG (ref 26.5–33)
MCHC RBC AUTO-ENTMCNC: 32.5 G/DL (ref 31.5–36.5)
MCV RBC AUTO: 84 FL (ref 78–100)
PHOSPHATE SERPL-MCNC: 3.1 MG/DL (ref 2.5–4.5)
PLATELET # BLD AUTO: 716 10E3/UL (ref 150–450)
POTASSIUM SERPL-SCNC: 4.2 MMOL/L (ref 3.4–5.3)
RBC # BLD AUTO: 2.99 10E6/UL (ref 4.4–5.9)
SODIUM SERPL-SCNC: 129 MMOL/L (ref 135–145)
WBC # BLD AUTO: 10.8 10E3/UL (ref 4–11)

## 2025-01-07 PROCEDURE — 250N000011 HC RX IP 250 OP 636

## 2025-01-07 PROCEDURE — 250N000013 HC RX MED GY IP 250 OP 250 PS 637: Performed by: NURSE PRACTITIONER

## 2025-01-07 PROCEDURE — 250N000013 HC RX MED GY IP 250 OP 250 PS 637: Performed by: STUDENT IN AN ORGANIZED HEALTH CARE EDUCATION/TRAINING PROGRAM

## 2025-01-07 PROCEDURE — 36592 COLLECT BLOOD FROM PICC: CPT

## 2025-01-07 PROCEDURE — 84100 ASSAY OF PHOSPHORUS: CPT | Performed by: SURGERY

## 2025-01-07 PROCEDURE — 250N000013 HC RX MED GY IP 250 OP 250 PS 637: Performed by: SURGERY

## 2025-01-07 PROCEDURE — 250N000013 HC RX MED GY IP 250 OP 250 PS 637

## 2025-01-07 PROCEDURE — 83735 ASSAY OF MAGNESIUM: CPT | Performed by: SURGERY

## 2025-01-07 PROCEDURE — 99232 SBSQ HOSP IP/OBS MODERATE 35: CPT | Performed by: PHYSICIAN ASSISTANT

## 2025-01-07 PROCEDURE — 85014 HEMATOCRIT: CPT

## 2025-01-07 PROCEDURE — 80048 BASIC METABOLIC PNL TOTAL CA: CPT

## 2025-01-07 PROCEDURE — 250N000009 HC RX 250: Performed by: NURSE PRACTITIONER

## 2025-01-07 PROCEDURE — 99232 SBSQ HOSP IP/OBS MODERATE 35: CPT | Mod: 24 | Performed by: SURGERY

## 2025-01-07 PROCEDURE — 120N000002 HC R&B MED SURG/OB UMMC

## 2025-01-07 PROCEDURE — 250N000011 HC RX IP 250 OP 636: Performed by: STUDENT IN AN ORGANIZED HEALTH CARE EDUCATION/TRAINING PROGRAM

## 2025-01-07 RX ORDER — MAGNESIUM OXIDE 400 MG/1
400 TABLET ORAL EVERY 4 HOURS
Status: COMPLETED | OUTPATIENT
Start: 2025-01-07 | End: 2025-01-07

## 2025-01-07 RX ADMIN — METHOCARBAMOL TABLETS 500 MG: 500 TABLET, COATED ORAL at 19:47

## 2025-01-07 RX ADMIN — LIDOCAINE 4% 1 PATCH: 40 PATCH TOPICAL at 19:48

## 2025-01-07 RX ADMIN — PANTOPRAZOLE SODIUM 40 MG: 40 INJECTION, POWDER, FOR SOLUTION INTRAVENOUS at 09:16

## 2025-01-07 RX ADMIN — MAGNESIUM OXIDE TAB 400 MG (241.3 MG ELEMENTAL MG) 400 MG: 400 (241.3 MG) TAB at 09:16

## 2025-01-07 RX ADMIN — ACETAMINOPHEN 975 MG: 325 TABLET, FILM COATED ORAL at 22:03

## 2025-01-07 RX ADMIN — BICTEGRAVIR SODIUM, EMTRICITABINE, AND TENOFOVIR ALAFENAMIDE FUMARATE 1 TABLET: 50; 200; 25 TABLET ORAL at 13:15

## 2025-01-07 RX ADMIN — METHOCARBAMOL TABLETS 500 MG: 500 TABLET, COATED ORAL at 09:16

## 2025-01-07 RX ADMIN — METHOCARBAMOL TABLETS 500 MG: 500 TABLET, COATED ORAL at 13:15

## 2025-01-07 RX ADMIN — IBUPROFEN 600 MG: 600 TABLET ORAL at 23:28

## 2025-01-07 RX ADMIN — ACETAMINOPHEN 975 MG: 325 TABLET, FILM COATED ORAL at 05:51

## 2025-01-07 RX ADMIN — ENOXAPARIN SODIUM 40 MG: 40 INJECTION SUBCUTANEOUS at 19:47

## 2025-01-07 RX ADMIN — ACETAMINOPHEN 975 MG: 325 TABLET, FILM COATED ORAL at 13:15

## 2025-01-07 RX ADMIN — QUETIAPINE FUMARATE 50 MG: 50 TABLET ORAL at 22:03

## 2025-01-07 RX ADMIN — MAGNESIUM OXIDE TAB 400 MG (241.3 MG ELEMENTAL MG) 400 MG: 400 (241.3 MG) TAB at 13:22

## 2025-01-07 RX ADMIN — Medication 5 ML: at 13:17

## 2025-01-07 RX ADMIN — SENNOSIDES 2 TABLET: 8.6 TABLET, FILM COATED ORAL at 09:16

## 2025-01-07 RX ADMIN — OXYCODONE HYDROCHLORIDE 10 MG: 10 TABLET ORAL at 09:13

## 2025-01-07 RX ADMIN — INSULIN GLARGINE 24 UNITS: 100 INJECTION, SOLUTION SUBCUTANEOUS at 09:22

## 2025-01-07 RX ADMIN — SENNOSIDES 2 TABLET: 8.6 TABLET, FILM COATED ORAL at 19:46

## 2025-01-07 RX ADMIN — OXYCODONE HYDROCHLORIDE 10 MG: 10 TABLET ORAL at 22:02

## 2025-01-07 RX ADMIN — OXYCODONE HYDROCHLORIDE 10 MG: 10 TABLET ORAL at 18:04

## 2025-01-07 RX ADMIN — SIMETHICONE CHEW TAB 80 MG 80 MG: 80 TABLET ORAL at 19:46

## 2025-01-07 ASSESSMENT — ACTIVITIES OF DAILY LIVING (ADL)
ADLS_ACUITY_SCORE: 39

## 2025-01-07 NOTE — PROGRESS NOTES
CLINICAL NUTRITION SERVICES - REASSESSMENT NOTE     Nutrition Prescription    RECOMMENDATIONS FOR MDs/PROVIDERS TO ORDER:  Would need tight glycemic control for better wound healing support, Preferably < 140-180    Malnutrition Status:    Moderate malnutrition in the context of acute presentation    Recommendations already ordered by Registered Dietitian (RD):  Continue to encourage 3 ensure max per day ( ~ 90 grams protein total daily) to optimize for wound healing support     Future/Additional Recommendations:  PO adequacy  Wound status      EVALUATION OF THE PROGRESS TOWARD GOALS   Diet: Regular + ensure max with each meal trays ( total 3 per day)    Nutrition Support: Was on TPN from - 1/3.   TPN discontinued by general surgery team. Endocrine signed off.     Intake: calorie count with insufficient data  Visited with patient today. Not wanting to see dietitian at this time.     Per flow sheet review, patient is consuming % of meals over the past couple of days. Appears to be tolerating intake per discussion with nursing staff.         NEW FINDINGS   Awaiting for TCU placement.   PMH includes HIV on ART, DM2, methamphetamine use,    - Recent admission from - for SBO managed successfully with bowel rest  - Presented to ED again on 24 for recurrent SBO. Now s/p ex lap/SBR on 24 and then s/p ex-lap/SBR/re-anastomosis for anastomotic leak on 24. Extubated .   DM2: complicated by peripheral neuropathy, Gastroparesis. On lantus 24 units every morning and Novolog Meal coverage + sliding scale insulin. Currently POD 14.     GI:  No BM x 2 days. Previously with daily BM -        Wt trend:  All bed scale wt with large fluctuations  Current wt: 74.9 kg (165 lb 2 oz)   Admit wt: 81.4 kg (179 lb 7.3 oz)   If accurate wt above, patient with 7.9% wt loss since surgery / admission x 2 weeks     Labs noted:  BUN: 24.2, Cr: 0.74, GFR: >90   Lytes normal range  B (H) - on  insulin     WOC RN note from 1/6:   Midline abdomen: Wound due to surgical wound 12/24  Negative pressure wound therapy ( initiated 12/30-1/6)  STATUS: deteriorating and granulating     MALNUTRITION  % Intake: < 75% for > 7 days (moderate)  % Weight Loss: > 2% in 1 week (severe)  Subcutaneous Fat Loss: None observed  Muscle Loss: None observed  Fluid Accumulation/Edema: None noted  Malnutrition Diagnosis: Moderate malnutrition in the context of acute presentation      Previous Goals   Total avg nutritional intake to meet a minimum of 25 kcal/kg and 1.5 g PRO/kg daily (per dosing wt 64.7 kg).   Evaluation: Met previously with TPN      Previous Nutrition Diagnosis  Altered GI function related to bowel resection as evidenced by need for TPN to meet nutrition needs.     Evaluation: Improving    CURRENT NUTRITION DIAGNOSIS  Increased protein needs related to surgical wound healing support as evidenced by estimated protein needs of 1.5-2.0 gram/kg per 65 kg adjusted wt.       INTERVENTIONS  Implementation  Medical food supplement therapy: ensure max TID to optimize protein intake     Goals  Patient to consume % of nutritionally adequate meal trays TID, or the equivalent with supplements/snacks.    Monitoring/Evaluation  Progress toward goals will be monitored and evaluated per protocol.      Romy Chaves RD/LD  Unit 7C (7501-1882) and ([id number]),  Monday-Friday: Vocera -> (7C clinical Dietitian),   Weekend/Holiday: Vocera -> (Weekend Clinical Dietitian)

## 2025-01-07 NOTE — PLAN OF CARE
Goal Outcome Evaluation:      Plan of Care Reviewed With: patient    Overall Patient Progress: no changeOverall Patient Progress: no change      Outcome Evaluation: Pt alert and oriented x4 , complains of pain 10/10 oxycodone given 1x and dilaudid given 1x . pt doesnt have any other complain.room air.Continue plan of care.

## 2025-01-07 NOTE — PROGRESS NOTES
Care Management Follow Up    Length of Stay (days): 18  Expected Discharge Date: 01/09/2025  Concerns to be Addressed: discharge planning     Patient plan of care discussed at interdisciplinary rounds: Yes  Anticipated Discharge Disposition: Transitional Care  Anticipated Discharge Services: None  Anticipated Discharge DME: None  Patient/family educated on Medicare website which has current facility and service quality ratings: yes  Education Provided on the Discharge Plan: Yes  Patient/Family in Agreement with the Plan: yes  Referrals Placed by CM/SW: Post Acute Facilities    Active Referrals:     Inactive Referrals:  [facility] Global Referral - Global denial d/t IV drug use & need for a PICC & TPN.  [facility] Global Referral - Global denial d/t the pt's TOMMY/CD treatment needs  [facility]- cannot meet patient's needs   [facility] Global Referral- cannot meet patient's needs, history of violence and/or drug use or alcohol abuse   [facility]-history of violence and/or drug use or alcohol abuse   [facility]- pt no longer LTACH appropriate  [facility]- pt no longer LTACH appropriate  Private pay costs discussed: Not applicable    Discussed  Partnership in Safe Discharge Planning  document with patient/family: Yes     Handoff Completed: No, handoff not indicated or clinically appropriate- pt declines help getting PCP.    Additional Information:  Per Surgery Team, patient is medically ready for discharge.  Per PT disposition recommendations, TCU is recommended as patient is Ax1-2 pivot pending command following/lethargy and requires assistance for all OOB mobility.  Patient was previously at [facility] for CD treatment and would need to be independent to return to this treatment program.  Patient is unhoused at baseline and has limited supports and no county program or worker involvement.      Patient now has global TCU denials due to his psychosocial history.  As such,  this writer escalated this patient's case to [facility] for assistance with disposition.    Next Steps: SW will continue to follow for safe discharge and collaborate with [facility] for any guidance.    __________________________     RADHA Larson, ALFONSOSW  , [facility]  7C Medical Surgical Beds 9915-3438   Desk Phone: [phone]   Securely message with [employer] (Search Name or  Med Surg [phone] )  Text page via [facility] Paging/Directory   See signed in provider for up to date coverage information  Social Work & Care Management Department

## 2025-01-07 NOTE — PROGRESS NOTES
Inpatient Diabetes Management Service : Progress Note     Andrew Collier is a 59 year old with Type 2 Diabetes Mellitus. PMH includes HIV on ART,methamphetamine use,  and recent admission from 12/17-12/19 for SBO managed successfully with bowel rest/GGC who presented to ED again on 12/20/24 for recurrent SBO. Currently  s/p ex lap/SBR on 12/20 and then s/p ex-lap/SBR/re-anastomosis for anastomotic leak on 12/24/24. Extubated 12/26 AM.     Inpatient Diabetes Service consulted for management to get patient off the insulin drip now that he is moved to the med surg floor.         Assessment and Recommendations:       Assessment:     Type 2 Diabetes Mellitus, complicated by peripheral neuropathy, gastroparesis. Sub-optimal control. A1c 10.5% (10/23/24)  Abd pain with known hx of gastroparesis (received dx and education 9/25/2023)  post surgery  Methamphetamine use disorder  History of Food and housing insecurities resulting in binge eating and poor dietary choices  HIV  Recurrent SBO,  s/p ex lap/SBR on 12/20 and then s/p ex-lap/SBR/re-anastomosis for anastomotic leak on 12/24/24  Stress hyperglycemia (TPN  stopped as of 1/3)  8.   BMI: 29    Plan/Recommendations:      No changes to insulin plan  - Continue Lantus 24 units q 24 hrs at 0900   -  Continue Novolog Meal Coverage: 1 units per 8 g CHO, TID AC and PRN with snacks/supplements  - Novolog Correction Scale: custom resistance (ISF: 1/35)  TID AC / HS / 0200   - BG monitoring: TID AC, HS, 0200  - Hypoglycemia protocol    - Carb counting protocol     Discussion:    IST=909 lab this am so no change to lantus.  No change to correction or carb coverage.  No further need for management by specialty diabetes team, BG are in range.    Inpatient Diabetes Team is signing off on 1/7/2025.Sent message to Dr Viv Angel and she responded.      Per SW:  So far unable to locate a TCU that is able to take the patient.  She believes that he likely will have to stay in  the hospital to heal and for rehab.      TENTATIVE Diabetes Management Discharge Plan  Instructions to patient will be  posted in AVS and discussed on day of discharge.   Medications and supplies are to be ordered by primary service on discharge.   *please use the DIAB non-branded discharge supply order set ([id number])*    Patient will need the following supplies prescribed:   Will have to ask before he leaves or nothing if he goes to the TCU     Blood glucose monitoring:   Three times daily before meals, and at bedtime.    Blood Glucose (BG) goals:  mg/dL before meals, less than 180 mg/dL 2 hrs after meals.     Glucose Control Regimen:  2) Long-acting insulin: glargine (Lantus) - take  24 units once daily at  09:00.Take at same time each day.     3) Rapid-acting insulin: aspart (Novolog) carbohydrate coverage/mealtime insulin - take 1 unit per 8 grams of carbohydrates before meals and snacks.  Take  7  units before average meals that contain carbohydrates  Take  5 units before small meals or snacks that contain carbohydrates    4) Rapid-acting insulin: aspart (Novolog) correction - see chart below. Take for high blood glucoses three times daily before meals and at bedtime.   You may add the correction dose to the carbohydrate coverage/mealtime insulin dose and give in one injection--ideally 10-15 minutes before a meal.  You may take the correction dose even if you skip a meal (as long as it has been 4 hours since previous correction dose).             Pre-meal correction scale:  1/50    Blood Glucose Insulin Novolog Before Meals:   Less than 140 0 units   140 -189  1 units   190 - 239 2 units   240 - 289 3 units   290 - 339 4 units    340 - 389  5 units   390 - 439 6 units   440 or more 7 units     Bedtime correction scale:     Blood Glucose Insulin Novolog At Bedtime:   Less than 200 0 units   200-249  1 units   250 - 299 2 units   300 - 349 3 units   350 - 399 4 units    400 or more 5 units        Outpatient Follow-Up: An appointment request was sent to the [facility] coordinator to schedule your outpatient diabetes appointment 1-2 weeks from discharge from the TCU.       Interval History/Review of Systems :   The last 24 hours progress and nursing notes reviewed.     - no new or acute concerns. He says he is not eating much but per RN is drinking Pepsi. No n,v,d.  Creat=0.63, GFR >90  Inpatient Glucose Trends:             Diabetes History:   Diabetes Type and Duration:  T2DM since ~2017 (2015 per A1c 6.7% 10/2015)   GAD65 antibody, zinc transporter 8 antibody, islet antibody, insulin antibody, and c-peptide  not available on epic search     PTA Medication Regimen: Lantus 27 units bid and novolog 10 units per meal- pharmacist says he was taking lantus 27 units daily and Novlog 10 units with meals.  Last time he was discharged he was discharged on lantus 25 units daily and 10 units of Novolog with meal      Historical Diabetes Medications:    Glipizide ER 10 mg daily --> discontinued 7/25/24, patient not sure why this was discontinued and notes aren't clear.  - Januvia ? - patient doesn't remember taking this, was using last yr per chart review  - Hx mild allergy to GLP-1 (trulicity) rash on chest/arms, legs --> also has hx of gastroparesis and bowel obstruction.   - Metformin, discontinued 2/2 history of side effects of diarrhea   - Jardiance: may have caused wounds on leg (unclear hx) per chart review    Glucose monitoring device/frequency/trends: not using the dexcom, not checking frequently    Outpatient Diabetes Provider:  no-[facility] endocr[facility]ology visit 10/10/2023 following hospital admission 9/2023. Patient states he doesn't have a PCP. Established care with internal med MD (Anna Dailey) 2/2024. Follows with pharmacist (Dorie Holland) for Medication Therapy Management, last saw Skyler, 7/25/24.  Has mostly seen his IDS provider since.  Has been in and out of the  "ER/hospital frequently and has not seen anyone since for his diabetes.        Medications Impacting Glycemia:    Steroids: with surgery, received dex 8 mg x1 and then none since.  No plan to give more steroids  D5W containing solutions/medications: None  Other medications impacting glucose: None         Diet/Nutrition:    Orders Placed This Encounter      Regular Diet Adult     Supplements:  none  TPN:  TPN stopped.  Last dose on 1/2/2024 at 8 pm   started on 12/25/2024: has reached goal on TPN, has 260g of dextrose 1/2/2025 cycle to 12 hours today, same dextrose 180g           Physical Exam:    /80 (BP Location: Left arm)   Pulse 104   Temp 99.9  F (37.7  C) (Oral)   Resp 16   Ht 1.626 m (5' 4\")   Wt 74.9 kg (165 lb 2 oz)   SpO2 96%   BMI 28.34 kg/m     General:   NAD, laying in bed.  Lungs: unlabored breathing on RA.  ABD:  rounded,  Has large wound on abdomen , not viewed  today  Skin:  warm and dry, no obvious lesions  MSK:   moving all extremities  Lymp:   no LE edema  Feet: not examined today   Mental Status:  Alert and oriented x3  Psych:   Cooperative, good eye contact, full/appropriate affect         Laboratory Data:      Lab Results   Component Value Date    A1C 10.5 (H) 10/23/2024    A1C 11.7 (H) 07/13/2024    A1C 13.4 (H) 02/06/2024    A1C 10.7 (H) 11/09/2023    A1C 13.3 (H) 08/06/2023    A1C 9.7 (H) 11/11/2020    A1C 8.2 (H) 09/25/2020    A1C 8.2 (H) 08/27/2020    A1C 11.6 (H) 02/11/2020    A1C 12.3 (H) 01/07/2020     ROUTINE IP LABS (Last four results)  BMP  Recent Labs   Lab 01/07/25  0630 01/07/25  0134 01/06/25  2149 01/06/25  1823 01/06/25  0759 01/06/25  0543 01/05/25  0844 01/05/25  0728 01/04/25  1306 01/04/25  1006   *  --   --   --   --  131*  --  131*  --  128*   POTASSIUM 4.2  --   --   --   --  4.2  --  4.4  --  4.6   CHLORIDE 97*  --   --   --   --  98  --  98  --  94*   LINDA 8.0*  --   --   --   --  7.7*  --  7.5*  --  7.7*   CO2 24  --   --   --   --  24  --  24  --  24 "   BUN 18.3  --   --   --   --  16.1  --  18.6  --  19.2   CR 0.63*  --   --   --   --  0.67  --  0.68  --  0.61*   * 123* 115* 109*   < > 137*   < > 216*   < > 128*    < > = values in this interval not displayed.     CBC  Recent Labs   Lab 01/07/25  0630 01/06/25  0543 01/05/25  0728 01/04/25  1006   WBC 10.8 10.2 9.1 9.4   RBC 2.99* 2.80* 2.92* 2.91*   HGB 8.2* 7.8* 8.0* 8.0*   HCT 25.2* 23.7* 24.8* 24.7*   MCV 84 85 85 85   MCH 27.4 27.9 27.4 27.5   MCHC 32.5 32.9 32.3 32.4   RDW 14.1 14.3 14.2 14.2   * 596* 584* 553*     INR  Recent Labs   Lab 01/06/25  0543 01/02/25  0446   INR 1.30* 1.30*     Lab Results   Component Value Date    AST 49 01/02/2025    AST 12 06/24/2021     Lab Results   Component Value Date    ALT 6 01/02/2025    ALT 20 06/24/2021     Kate Quintero PA-C  Inpatient Diabetes Service  [phone]  Available by Labmeeting  Date of Service: 1/7/2025  .     To contact Inpatient Diabetes Service:     7 AM - 5 PM: Page the [employer] [facility] KASS[facility] following the patient that day (see filed or incomplete progress notes/consult notes under Endocrinology)    OR if uncertain of provider assignment: page job code [phone]    5 PM - 7 AM: First call after hours is to primary service.    For urgent after-hours questions, page job code for on call fellow: [phone]     I spent a total of 35 minutes on the date of the encounter doing prep/post-work, chart review, history and exam, documentation and further activities per the note including lab review, multidisciplinary communication, counseling the patient and/or coordinating care regarding acute hyper/hypoglycemic management, as well as discharge management and planning/communication.  See note for details.

## 2025-01-07 NOTE — PROGRESS NOTES
"Emergency General Surgery Progress Note   01/07/2025    Subjective  Patient with ongoing pain overnight that fluctuates throughout the day. This morning his pain was well controlled.  Tolerated oral intake yesterday as soda and yogurt. Denies nausea or vomiting.  Passing gas however no BM.      Objective  /80 (BP Location: Left arm)   Pulse 104   Temp 99.9  F (37.7  C) (Oral)   Resp 16   Ht 1.626 m (5' 4\")   Wt 74.9 kg (165 lb 2 oz)   SpO2 96%   BMI 28.34 kg/m      General: Alert, NAD  CV: RRR  Resp: No increased work of breathing on RA  Abd: soft, tender to palpation along midline incision, ongoing mild distention, midline incision.    Ext: WWP    Recent Labs   Lab 01/07/25  0630 01/06/25  0543 01/05/25  0728   WBC 10.8 10.2 9.1   HGB 8.2* 7.8* 8.0*   * 596* 584*       Recent Labs   Lab 01/07/25  0134 01/06/25  2149 01/06/25  1823 01/06/25  0759 01/06/25  0543 01/05/25  0844 01/05/25  0728 01/04/25  1306 01/04/25  1006 01/03/25  0801 01/03/25  0539   NA  --   --   --   --  131*  --  131*  --  128*  --  130*  130*   POTASSIUM  --   --   --   --  4.2  --  4.4  --  4.6  --  4.5   CHLORIDE  --   --   --   --  98  --  98  --  94*  --  98   CO2  --   --   --   --  24  --  24  --  24  --  24   BUN  --   --   --   --  16.1  --  18.6  --  19.2  --  19.8   CR  --   --   --   --  0.67  --  0.68  --  0.61*  --  0.64*   * 115* 109*   < > 137*   < > 216*   < > 128*   < > 301*   LINDA  --   --   --   --  7.7*  --  7.5*  --  7.7*  --  8.2*   MAG  --   --   --   --  1.9  --   --   --  1.9  --  1.9   PHOS  --   --   --   --  2.9  --   --   --  3.0  --  3.3    < > = values in this interval not displayed.       No lab results found in last 7 days.      Recent Labs   Lab 01/06/25 0543 01/02/25 0446   INR 1.30* 1.30*       Recent Labs   Lab 01/06/25 0543 01/02/25 0446   AST 35 49*   ALT 11 6   ALKPHOS 424* 342*   BILITOTAL 0.3 0.3   ALBUMIN 2.3* 1.9*   INR 1.30* 1.30*      Imaging:  No new imaging     A/P: " 59yoM with h/o T2DM, HIV on ART, and recent admission from 12/17-12/19 for SBO managed successfully with bowel rest/GGC who presented to ED again on 12/20 for recurrent SBO s/p ex lap/SBR on 12/20. Now s/p ex-lap/SBR/re-anastomosis for anastomotic leak on 12/24. Extubated 12/26 AM. Now with anterograde bowel function and working to improve oral intake.      POD# 14 - TCU referrals sent out, awaiting placement.  Encouraged more p.o. intake, continue discharge planning.  Scheduled Robaxin added for additional pain control. Medically ready for discharge.     - Continue multimodal pain control   - Continue regular diet and Boost supplements  - Continue Biktarvy  - Endocrine following for diabetes/insulin management. Appreciate recs  - Wet to dry dressing changes BID  - Replete electrolytes PRN  - Dispo: TCU referrals sent out, still awaiting placement. Appreciate SW help.  Medically ready for discharge    Patient seen, examined and discussed with chief resident and discussed with staff surgeon.     Viv Angel, MS3  [facility] [employer]      ----    I agree with the findings, assessment, and plan documented by medical student Jeanne. Edits made where indicated.     Discussed with Dr. Humble Valentine MD PGY5  [facility] General Surgery Resident

## 2025-01-07 NOTE — PLAN OF CARE
"Blood pressure 128/74, pulse 89, temperature 98.7  F (37.1  C), temperature source Oral, resp. rate 16, height 1.626 m (5' 4\"), weight 74.9 kg (165 lb 2 oz), SpO2 96%.     Assumed care 07:00    Patient A & O X 4 , forgetful at time and .Irritable easily. Received scheduled medications and Dilaudid IV X 1 prior to wound change by WOC , given Oxycodone PRN X 1. Appetite fair , void in urinal Patient educated to ambulate , patient declined , instruct the importance of activity , continue declining. Appetite , fair , 140 , 109, Insulin given per range. Magnesium 1.9 , replaced per protocol.  Recheck in Am.  Call light within reach, bed alarm on. Continue with POC care and update MD with change         Goal Outcome Evaluation:    Plan of Care Reviewed With: patient  Overall Patient Progress: no changeOverall Patient Progress: no change           "

## 2025-01-08 ENCOUNTER — APPOINTMENT (OUTPATIENT)
Dept: PHYSICAL THERAPY | Facility: CLINIC | Age: 60
End: 2025-01-08
Payer: COMMERCIAL

## 2025-01-08 LAB
ANION GAP SERPL CALCULATED.3IONS-SCNC: 11 MMOL/L (ref 7–15)
BUN SERPL-MCNC: 24.2 MG/DL (ref 8–23)
CALCIUM SERPL-MCNC: 8.1 MG/DL (ref 8.8–10.4)
CHLORIDE SERPL-SCNC: 98 MMOL/L (ref 98–107)
CREAT SERPL-MCNC: 0.74 MG/DL (ref 0.67–1.17)
EGFRCR SERPLBLD CKD-EPI 2021: >90 ML/MIN/1.73M2
ERYTHROCYTE [DISTWIDTH] IN BLOOD BY AUTOMATED COUNT: 13.7 % (ref 10–15)
GLUCOSE BLDC GLUCOMTR-MCNC: 119 MG/DL (ref 70–99)
GLUCOSE BLDC GLUCOMTR-MCNC: 161 MG/DL (ref 70–99)
GLUCOSE BLDC GLUCOMTR-MCNC: 200 MG/DL (ref 70–99)
GLUCOSE BLDC GLUCOMTR-MCNC: 202 MG/DL (ref 70–99)
GLUCOSE BLDC GLUCOMTR-MCNC: 223 MG/DL (ref 70–99)
GLUCOSE SERPL-MCNC: 234 MG/DL (ref 70–99)
HCO3 SERPL-SCNC: 23 MMOL/L (ref 22–29)
HCT VFR BLD AUTO: 24.1 % (ref 40–53)
HGB BLD-MCNC: 7.8 G/DL (ref 13.3–17.7)
MAGNESIUM SERPL-MCNC: 1.9 MG/DL (ref 1.7–2.3)
MCH RBC QN AUTO: 27.3 PG (ref 26.5–33)
MCHC RBC AUTO-ENTMCNC: 32.4 G/DL (ref 31.5–36.5)
MCV RBC AUTO: 84 FL (ref 78–100)
PHOSPHATE SERPL-MCNC: 2.7 MG/DL (ref 2.5–4.5)
PLATELET # BLD AUTO: 653 10E3/UL (ref 150–450)
POTASSIUM SERPL-SCNC: 3.9 MMOL/L (ref 3.4–5.3)
RBC # BLD AUTO: 2.86 10E6/UL (ref 4.4–5.9)
SODIUM SERPL-SCNC: 132 MMOL/L (ref 135–145)
WBC # BLD AUTO: 9.3 10E3/UL (ref 4–11)

## 2025-01-08 PROCEDURE — 97116 GAIT TRAINING THERAPY: CPT | Mod: GP

## 2025-01-08 PROCEDURE — 250N000011 HC RX IP 250 OP 636

## 2025-01-08 PROCEDURE — 250N000013 HC RX MED GY IP 250 OP 250 PS 637: Performed by: NURSE PRACTITIONER

## 2025-01-08 PROCEDURE — 250N000013 HC RX MED GY IP 250 OP 250 PS 637: Performed by: STUDENT IN AN ORGANIZED HEALTH CARE EDUCATION/TRAINING PROGRAM

## 2025-01-08 PROCEDURE — 250N000013 HC RX MED GY IP 250 OP 250 PS 637

## 2025-01-08 PROCEDURE — 83735 ASSAY OF MAGNESIUM: CPT | Performed by: SURGERY

## 2025-01-08 PROCEDURE — 36592 COLLECT BLOOD FROM PICC: CPT

## 2025-01-08 PROCEDURE — 99232 SBSQ HOSP IP/OBS MODERATE 35: CPT | Mod: 24 | Performed by: SURGERY

## 2025-01-08 PROCEDURE — 250N000013 HC RX MED GY IP 250 OP 250 PS 637: Performed by: SURGERY

## 2025-01-08 PROCEDURE — 999N000044 HC STATISTIC CVC DRESSING CHANGE

## 2025-01-08 PROCEDURE — 84100 ASSAY OF PHOSPHORUS: CPT | Performed by: SURGERY

## 2025-01-08 PROCEDURE — 999N000007 HC SITE CHECK

## 2025-01-08 PROCEDURE — 85014 HEMATOCRIT: CPT

## 2025-01-08 PROCEDURE — 250N000009 HC RX 250: Performed by: NURSE PRACTITIONER

## 2025-01-08 PROCEDURE — 80048 BASIC METABOLIC PNL TOTAL CA: CPT

## 2025-01-08 PROCEDURE — 120N000002 HC R&B MED SURG/OB UMMC

## 2025-01-08 PROCEDURE — 250N000011 HC RX IP 250 OP 636: Performed by: STUDENT IN AN ORGANIZED HEALTH CARE EDUCATION/TRAINING PROGRAM

## 2025-01-08 PROCEDURE — 97530 THERAPEUTIC ACTIVITIES: CPT | Mod: GP

## 2025-01-08 RX ADMIN — OXYCODONE HYDROCHLORIDE 5 MG: 5 TABLET ORAL at 16:52

## 2025-01-08 RX ADMIN — SIMETHICONE CHEW TAB 80 MG 80 MG: 80 TABLET ORAL at 08:40

## 2025-01-08 RX ADMIN — QUETIAPINE FUMARATE 50 MG: 50 TABLET ORAL at 22:35

## 2025-01-08 RX ADMIN — METHOCARBAMOL TABLETS 500 MG: 500 TABLET, COATED ORAL at 15:09

## 2025-01-08 RX ADMIN — ACETAMINOPHEN 975 MG: 325 TABLET, FILM COATED ORAL at 22:35

## 2025-01-08 RX ADMIN — SENNOSIDES 2 TABLET: 8.6 TABLET, FILM COATED ORAL at 18:53

## 2025-01-08 RX ADMIN — INSULIN GLARGINE 24 UNITS: 100 INJECTION, SOLUTION SUBCUTANEOUS at 08:38

## 2025-01-08 RX ADMIN — Medication 5 ML: at 06:21

## 2025-01-08 RX ADMIN — Medication 5 MG: at 18:53

## 2025-01-08 RX ADMIN — ACETAMINOPHEN 975 MG: 325 TABLET, FILM COATED ORAL at 06:16

## 2025-01-08 RX ADMIN — OXYCODONE HYDROCHLORIDE 10 MG: 10 TABLET ORAL at 12:58

## 2025-01-08 RX ADMIN — METHOCARBAMOL TABLETS 500 MG: 500 TABLET, COATED ORAL at 20:55

## 2025-01-08 RX ADMIN — OXYCODONE HYDROCHLORIDE 5 MG: 5 TABLET ORAL at 20:56

## 2025-01-08 RX ADMIN — SIMETHICONE CHEW TAB 80 MG 80 MG: 80 TABLET ORAL at 16:52

## 2025-01-08 RX ADMIN — METHOCARBAMOL TABLETS 500 MG: 500 TABLET, COATED ORAL at 08:37

## 2025-01-08 RX ADMIN — POLYETHYLENE GLYCOL 3350 17 G: 17 POWDER, FOR SOLUTION ORAL at 08:39

## 2025-01-08 RX ADMIN — OXYCODONE HYDROCHLORIDE 10 MG: 10 TABLET ORAL at 02:28

## 2025-01-08 RX ADMIN — ENOXAPARIN SODIUM 40 MG: 40 INJECTION SUBCUTANEOUS at 20:56

## 2025-01-08 RX ADMIN — IBUPROFEN 600 MG: 600 TABLET ORAL at 08:37

## 2025-01-08 RX ADMIN — SENNOSIDES 2 TABLET: 8.6 TABLET, FILM COATED ORAL at 08:37

## 2025-01-08 RX ADMIN — ACETAMINOPHEN 975 MG: 325 TABLET, FILM COATED ORAL at 15:08

## 2025-01-08 RX ADMIN — OXYCODONE HYDROCHLORIDE 10 MG: 10 TABLET ORAL at 08:37

## 2025-01-08 RX ADMIN — Medication 5 ML: at 13:01

## 2025-01-08 RX ADMIN — BICTEGRAVIR SODIUM, EMTRICITABINE, AND TENOFOVIR ALAFENAMIDE FUMARATE 1 TABLET: 50; 200; 25 TABLET ORAL at 15:09

## 2025-01-08 RX ADMIN — PANTOPRAZOLE SODIUM 40 MG: 40 INJECTION, POWDER, FOR SOLUTION INTRAVENOUS at 08:39

## 2025-01-08 ASSESSMENT — ACTIVITIES OF DAILY LIVING (ADL)
ADLS_ACUITY_SCORE: 36
ADLS_ACUITY_SCORE: 39
ADLS_ACUITY_SCORE: 36
ADLS_ACUITY_SCORE: 39
ADLS_ACUITY_SCORE: 36
ADLS_ACUITY_SCORE: 39
ADLS_ACUITY_SCORE: 36
ADLS_ACUITY_SCORE: 39
ADLS_ACUITY_SCORE: 36
ADLS_ACUITY_SCORE: 36
ADLS_ACUITY_SCORE: 39
ADLS_ACUITY_SCORE: 36

## 2025-01-08 NOTE — PROGRESS NOTES
Care Management Follow Up     Length of Stay (days): 18  Expected Discharge Date: 01/10/2025  Concerns to be Addressed: discharge planning     Patient plan of care discussed at interdisciplinary rounds: Yes  Anticipated Discharge Disposition: Transitional Care  Anticipated Discharge Services: None  Anticipated Discharge DME: None  Patient/family educated on Medicare website which has current facility and service quality ratings: yes  Education Provided on the Discharge Plan: Yes  Patient/Family in Agreement with the Plan: yes  Referrals Placed by CM/SW: Post Acute Facilities     Active Referrals: N/A     Inactive Referrals:  [facility] Global Referral - Global denial d/t IV drug use & need for a PICC & TPN.  [facility] Global Referral - Global denial d/t the pt's TOMMY/CD treatment needs  [facility]- cannot meet patient's needs   [facility] Global Referral- cannot meet patient's needs, history of violence and/or drug use or alcohol abuse   [facility]-history of violence and/or drug use or alcohol abuse   [facility]- pt no longer LTACH appropriate  [facility] LTACH- pt no longer LTACH appropriate    Private pay costs discussed: Not applicable  Discussed  Partnership in Safe Discharge Planning  document with patient/family: Yes   Handoff Completed: No, handoff not indicated or clinically appropriate- pt declines help getting PCP.  Additional Information: Per Surgery Team, patient is medically ready for discharge.  Per PT disposition recommendations, TCU is recommended as patient is Ax1-2 pivot pending command following/lethargy and requires assistance for all OOB mobility.  Patient was previously at [facility] for CD treatment and would need to be completely independent to return to this treatment program.  Patient is unhoused at baseline and has limited supports and no county program or worker involvement. Patient now has global TCU denials due to his psychosocial history. Pt was  "SWAT escalated on 1/8 for disposition assistance. Anticipate pt will remain in the hospital until less medically complex and with continued therapies so once is fully independent, will be able to pursue medical respite vs shelter vs return to CD treatment.     Next Steps: SW will continue to follow for safe discharge and collaborate with SWAT for any guidance.    RADHA Kamara, JOMAR  Clinical , [facility]-  Desk Phone: [phone]   Schedule: Wednesday, Thursday, Fridays (for Mondays & Tuesdays, contact job share partner Sharon Nguyen)  Securely message with [email or web address] (Search Name or 7C Med Surg [phone] SW)  Text page via [facility] Paging/Directory   See signed in provider for up to date coverage information  Social Work & Care Management Department    Weekend And After Hours Care Management Contacts:  After Hours Social Work Coverage [phone] daily: Searchable in Vocera \"Adult SW After Hours On Call\"   Weekend Coverage [phone]: Searchable in Vocera \"7C Med Surg SW\" or \"7C Med Surg RNCC\"  "

## 2025-01-08 NOTE — PROGRESS NOTES
"Emergency General Surgery Progress Note   01/08/2025    Subjective  Patient had a good night overnight. He reports his pain being very well controlled. He is passing gas and had a bowel movement yesterday. Discussed if he had any family around that could help out with his recovery, but his family in [place] would be unable.      Objective  BP (!) 148/79 (BP Location: Left arm)   Pulse 101   Temp 98.6  F (37  C) (Oral)   Resp 16   Ht 1.626 m (5' 4\")   Wt 74.9 kg (165 lb 2 oz)   SpO2 100%   BMI 28.34 kg/m      General: Alert, NAD  CV: RRR  Resp: No increased work of breathing on RA  Abd: soft, tender to palpation along midline incision, ongoing mild distention  Ext: WWP    Recent Labs   Lab 01/08/25 0627 01/07/25 0630 01/06/25  0543   WBC 9.3 10.8 10.2   HGB 7.8* 8.2* 7.8*   * 716* 596*       Recent Labs   Lab 01/08/25 0627 01/08/25  0219 01/07/25  2147 01/07/25  1219 01/07/25  0630 01/06/25  0759 01/06/25  0543   *  --   --   --  129*  --  131*   POTASSIUM 3.9  --   --   --  4.2  --  4.2   CHLORIDE 98  --   --   --  97*  --  98   CO2 23  --   --   --  24  --  24   BUN 24.2*  --   --   --  18.3  --  16.1   CR 0.74  --   --   --  0.63*  --  0.67   * 200* 159*   < > 139*   < > 137*   LINDA 8.1*  --   --   --  8.0*  --  7.7*   MAG 1.9  --   --   --  1.8  --  1.9   PHOS 2.7  --   --   --  3.1  --  2.9    < > = values in this interval not displayed.       No lab results found in last 7 days.      Recent Labs   Lab 01/06/25  0543 01/02/25  0446   INR 1.30* 1.30*       Recent Labs   Lab 01/06/25 0543 01/02/25  0446   AST 35 49*   ALT 11 6   ALKPHOS 424* 342*   BILITOTAL 0.3 0.3   ALBUMIN 2.3* 1.9*   INR 1.30* 1.30*      Imaging:  No new imaging     A/P: 59yoM with h/o T2DM, HIV on ART, and recent admission from 12/17-12/19 for SBO managed successfully with bowel rest/GGC who presented to ED again on 12/20 for recurrent SBO s/p ex lap/SBR on 12/20. Now s/p ex-lap/SBR/re-anastomosis for " anastomotic leak on 12/24. Extubated 12/26 AM. Now with anterograde bowel function and working to improve oral intake.      POD# 15 - TCU referrals sent out, awaiting placement.  Encouraged more p.o. intake, continue discharge planning.  Medically ready for discharge.     - Continue multimodal pain control   - Continue regular diet and Boost supplements  - Continue Biktarvy  - Wet to dry dressing changes BID  - Replete electrolytes PRN  - Dispo: TCU referrals sent out, still awaiting placement. Appreciate SW help.  Medically ready for discharge    Patient seen, examined and discussed with chief resident and discussed with staff surgeon.     Viv Angel, MS3  [facility] [employer]    ----------------------------------------------------------------------------------------------    This note was reviewed and accurately reflects the discussion with staff and chief resident during rounds.    Koby Garrido MD  General Surgery, PGY-1  [facility]

## 2025-01-08 NOTE — PLAN OF CARE
Goal Outcome Evaluation:      Plan of Care Reviewed With: patient    Overall Patient Progress: no changeOverall Patient Progress: no change    Outcome Evaluation: Pt alert and oriented x4 , pt complains of abdominal cramps, simethicone given.complains of pain 10/10 oxycodone given , after an hour pt called for pain again ,refered to dr on duty .  ibuprofen given. Abdomen wound dressing change done . waiting for TCU placement.

## 2025-01-08 NOTE — PLAN OF CARE
"Blood pressure 130/72, pulse 88, temperature 98.3  F (36.8  C), temperature source Oral, resp. rate 16, height 1.626 m (5' 4\"), weight 74.9 kg (165 lb 2 oz), SpO2 96%. Via RA       Patient A & O X 4 VSS abdominal pain manageable with scheduled medications and Oxycodone PRN. Refused to take Melatonin. Also refused abdominal dressing changed . Writer instruct patient the risk of infections . Patient continue saying not now. Sleeping between care .  Appetite fair drink Boost supplement, , 153, 209, insulin given per order.  Magnesium level 1.8 replaced per protocol  Patient Void adequate in urinal and at time urinate in Cup writer educate patient to use urinal or call staff for assist. Patient with encouragement  up in recliner chair for 1 hr and did okay. Call light within reach, bed alarm on . Continue with POC and update MD with change.       Plan awaiting for TCU placement .     Goal Outcome Evaluation:      Plan of Care Reviewed With: patient    Overall Patient Progress: improving. Pain is manageable with Oxycodone PRN.     "

## 2025-01-09 ENCOUNTER — APPOINTMENT (OUTPATIENT)
Dept: PHYSICAL THERAPY | Facility: CLINIC | Age: 60
End: 2025-01-09
Payer: COMMERCIAL

## 2025-01-09 VITALS
TEMPERATURE: 97.9 F | BODY MASS INDEX: 26.5 KG/M2 | RESPIRATION RATE: 19 BRPM | HEIGHT: 64 IN | DIASTOLIC BLOOD PRESSURE: 60 MMHG | WEIGHT: 155.2 LBS | OXYGEN SATURATION: 98 % | SYSTOLIC BLOOD PRESSURE: 110 MMHG | HEART RATE: 94 BPM

## 2025-01-09 LAB
ANION GAP SERPL CALCULATED.3IONS-SCNC: 11 MMOL/L (ref 7–15)
BUN SERPL-MCNC: 20.5 MG/DL (ref 8–23)
CALCIUM SERPL-MCNC: 8.1 MG/DL (ref 8.8–10.4)
CHLORIDE SERPL-SCNC: 99 MMOL/L (ref 98–107)
CREAT SERPL-MCNC: 0.61 MG/DL (ref 0.67–1.17)
EGFRCR SERPLBLD CKD-EPI 2021: >90 ML/MIN/1.73M2
ERYTHROCYTE [DISTWIDTH] IN BLOOD BY AUTOMATED COUNT: 13.8 % (ref 10–15)
GLUCOSE BLDC GLUCOMTR-MCNC: 140 MG/DL (ref 70–99)
GLUCOSE BLDC GLUCOMTR-MCNC: 158 MG/DL (ref 70–99)
GLUCOSE BLDC GLUCOMTR-MCNC: 177 MG/DL (ref 70–99)
GLUCOSE BLDC GLUCOMTR-MCNC: 182 MG/DL (ref 70–99)
GLUCOSE BLDC GLUCOMTR-MCNC: 225 MG/DL (ref 70–99)
GLUCOSE SERPL-MCNC: 172 MG/DL (ref 70–99)
HCO3 SERPL-SCNC: 22 MMOL/L (ref 22–29)
HCT VFR BLD AUTO: 25.6 % (ref 40–53)
HGB BLD-MCNC: 8.5 G/DL (ref 13.3–17.7)
MAGNESIUM SERPL-MCNC: 1.7 MG/DL (ref 1.7–2.3)
MCH RBC QN AUTO: 27.7 PG (ref 26.5–33)
MCHC RBC AUTO-ENTMCNC: 33.2 G/DL (ref 31.5–36.5)
MCV RBC AUTO: 83 FL (ref 78–100)
PHOSPHATE SERPL-MCNC: 3.3 MG/DL (ref 2.5–4.5)
PLATELET # BLD AUTO: 628 10E3/UL (ref 150–450)
POTASSIUM SERPL-SCNC: 4.1 MMOL/L (ref 3.4–5.3)
RBC # BLD AUTO: 3.07 10E6/UL (ref 4.4–5.9)
SODIUM SERPL-SCNC: 132 MMOL/L (ref 135–145)
WBC # BLD AUTO: 9.2 10E3/UL (ref 4–11)

## 2025-01-09 PROCEDURE — 99232 SBSQ HOSP IP/OBS MODERATE 35: CPT | Mod: 24 | Performed by: SURGERY

## 2025-01-09 PROCEDURE — 250N000013 HC RX MED GY IP 250 OP 250 PS 637

## 2025-01-09 PROCEDURE — 250N000013 HC RX MED GY IP 250 OP 250 PS 637: Performed by: SURGERY

## 2025-01-09 PROCEDURE — 250N000011 HC RX IP 250 OP 636: Performed by: STUDENT IN AN ORGANIZED HEALTH CARE EDUCATION/TRAINING PROGRAM

## 2025-01-09 PROCEDURE — 250N000013 HC RX MED GY IP 250 OP 250 PS 637: Performed by: STUDENT IN AN ORGANIZED HEALTH CARE EDUCATION/TRAINING PROGRAM

## 2025-01-09 PROCEDURE — 36415 COLL VENOUS BLD VENIPUNCTURE: CPT

## 2025-01-09 PROCEDURE — 250N000009 HC RX 250: Performed by: NURSE PRACTITIONER

## 2025-01-09 PROCEDURE — 83735 ASSAY OF MAGNESIUM: CPT | Performed by: STUDENT IN AN ORGANIZED HEALTH CARE EDUCATION/TRAINING PROGRAM

## 2025-01-09 PROCEDURE — 97116 GAIT TRAINING THERAPY: CPT | Mod: GP

## 2025-01-09 PROCEDURE — 84100 ASSAY OF PHOSPHORUS: CPT | Performed by: STUDENT IN AN ORGANIZED HEALTH CARE EDUCATION/TRAINING PROGRAM

## 2025-01-09 PROCEDURE — 97530 THERAPEUTIC ACTIVITIES: CPT | Mod: GP

## 2025-01-09 PROCEDURE — 250N000011 HC RX IP 250 OP 636

## 2025-01-09 PROCEDURE — 82565 ASSAY OF CREATININE: CPT

## 2025-01-09 PROCEDURE — 80048 BASIC METABOLIC PNL TOTAL CA: CPT

## 2025-01-09 PROCEDURE — 250N000013 HC RX MED GY IP 250 OP 250 PS 637: Performed by: NURSE PRACTITIONER

## 2025-01-09 PROCEDURE — 120N000002 HC R&B MED SURG/OB UMMC

## 2025-01-09 PROCEDURE — 85014 HEMATOCRIT: CPT

## 2025-01-09 RX ORDER — POLYETHYLENE GLYCOL 3350 17 G/17G
17 POWDER, FOR SOLUTION ORAL 2 TIMES DAILY
Status: DISCONTINUED | OUTPATIENT
Start: 2025-01-09 | End: 2025-01-20 | Stop reason: HOSPADM

## 2025-01-09 RX ADMIN — ENOXAPARIN SODIUM 40 MG: 40 INJECTION SUBCUTANEOUS at 19:48

## 2025-01-09 RX ADMIN — IBUPROFEN 600 MG: 600 TABLET ORAL at 18:46

## 2025-01-09 RX ADMIN — INSULIN GLARGINE 24 UNITS: 100 INJECTION, SOLUTION SUBCUTANEOUS at 09:14

## 2025-01-09 RX ADMIN — OXYCODONE HYDROCHLORIDE 5 MG: 5 TABLET ORAL at 11:12

## 2025-01-09 RX ADMIN — SENNOSIDES 2 TABLET: 8.6 TABLET, FILM COATED ORAL at 19:48

## 2025-01-09 RX ADMIN — ACETAMINOPHEN 975 MG: 325 TABLET, FILM COATED ORAL at 06:47

## 2025-01-09 RX ADMIN — METHOCARBAMOL TABLETS 500 MG: 500 TABLET, COATED ORAL at 14:47

## 2025-01-09 RX ADMIN — OXYCODONE HYDROCHLORIDE 5 MG: 5 TABLET ORAL at 06:47

## 2025-01-09 RX ADMIN — OXYCODONE HYDROCHLORIDE 5 MG: 5 TABLET ORAL at 17:11

## 2025-01-09 RX ADMIN — ACETAMINOPHEN 975 MG: 325 TABLET, FILM COATED ORAL at 21:11

## 2025-01-09 RX ADMIN — SENNOSIDES 2 TABLET: 8.6 TABLET, FILM COATED ORAL at 09:08

## 2025-01-09 RX ADMIN — Medication 15 ML: at 13:19

## 2025-01-09 RX ADMIN — Medication 5 MG: at 18:46

## 2025-01-09 RX ADMIN — MAGNESIUM HYDROXIDE 30 ML: 400 SUSPENSION ORAL at 09:09

## 2025-01-09 RX ADMIN — QUETIAPINE FUMARATE 50 MG: 50 TABLET ORAL at 21:11

## 2025-01-09 RX ADMIN — IBUPROFEN 600 MG: 600 TABLET ORAL at 09:07

## 2025-01-09 RX ADMIN — POLYETHYLENE GLYCOL 3350 17 G: 17 POWDER, FOR SOLUTION ORAL at 09:10

## 2025-01-09 RX ADMIN — METHOCARBAMOL TABLETS 500 MG: 500 TABLET, COATED ORAL at 19:48

## 2025-01-09 RX ADMIN — BICTEGRAVIR SODIUM, EMTRICITABINE, AND TENOFOVIR ALAFENAMIDE FUMARATE 1 TABLET: 50; 200; 25 TABLET ORAL at 14:47

## 2025-01-09 RX ADMIN — METHOCARBAMOL TABLETS 500 MG: 500 TABLET, COATED ORAL at 09:07

## 2025-01-09 RX ADMIN — PANTOPRAZOLE SODIUM 40 MG: 40 INJECTION, POWDER, FOR SOLUTION INTRAVENOUS at 09:17

## 2025-01-09 RX ADMIN — POLYETHYLENE GLYCOL 3350 17 G: 17 POWDER, FOR SOLUTION ORAL at 19:48

## 2025-01-09 RX ADMIN — OXYCODONE HYDROCHLORIDE 5 MG: 5 TABLET ORAL at 21:11

## 2025-01-09 RX ADMIN — ACETAMINOPHEN 975 MG: 325 TABLET, FILM COATED ORAL at 14:46

## 2025-01-09 ASSESSMENT — ACTIVITIES OF DAILY LIVING (ADL)
ADLS_ACUITY_SCORE: 36

## 2025-01-09 NOTE — PLAN OF CARE
"Goal Outcome Evaluation: 0700-1930      Plan of Care Reviewed With: patient    Overall Patient Progress: no changeOverall Patient Progress: no change     A&ox4, agitated, and irritable. Oxy and Advil given PRN for abd pain. Passing lots of gas today however still not BM. Addiction services consult, however pt refused to see them. Refusing assessments, repositioning form writer today. VSS on RA. Midline abd dressing changedx1 today. PICC TL Hep locked. Utilizing bedside urinal. Continue to monitor.     /57 (BP Location: Left arm, Patient Position: Right side, Cuff Size: Adult Regular)   Pulse 94   Temp 97.5  F (36.4  C) (Oral)   Resp 16   Ht 1.626 m (5' 4\")   Wt 70.4 kg (155 lb 3.2 oz)   SpO2 100%   BMI 26.64 kg/m     "

## 2025-01-09 NOTE — PROGRESS NOTES
"Emergency General Surgery Progress Note   01/09/2025    Subjective  Patient continues to be doing well. He endorsed moderate pain this morning. Continues to pass gas. Patient feels bloated and has not had a bowel movement in a while.     Objective  /79 (BP Location: Left arm)   Pulse 107   Temp 99.3  F (37.4  C) (Oral)   Resp 18   Ht 1.626 m (5' 4\")   Wt 70.4 kg (155 lb 3.2 oz)   SpO2 96%   BMI 26.64 kg/m      General: Alert, NAD  CV: RRR  Resp: No increased work of breathing on RA  Abd: Soft, appropriately tender, mild-moderately distended, midline incision dressing removed and changed. Incision c/d/I non-foul smelling, non-erythematous, without purulent drainage.  Ext: WWP    Recent Labs   Lab 01/09/25  0532 01/08/25  0627 01/07/25  0630   WBC 9.2 9.3 10.8   HGB 8.5* 7.8* 8.2*   * 653* 716*       Recent Labs   Lab 01/09/25  0532 01/09/25  0220 01/08/25  2234 01/08/25  0836 01/08/25  0627 01/07/25  1219 01/07/25  0630 01/06/25  0759 01/06/25  0543   *  --   --   --  132*  --  129*  --  131*   POTASSIUM 4.1  --   --   --  3.9  --  4.2  --  4.2   CHLORIDE 99  --   --   --  98  --  97*  --  98   CO2 22  --   --   --  23  --  24  --  24   BUN 20.5  --   --   --  24.2*  --  18.3  --  16.1   CR 0.61*  --   --   --  0.74  --  0.63*  --  0.67   * 158* 161*   < > 234*   < > 139*   < > 137*   LINDA 8.1*  --   --   --  8.1*  --  8.0*  --  7.7*   MAG  --   --   --   --  1.9  --  1.8  --  1.9   PHOS  --   --   --   --  2.7  --  3.1  --  2.9    < > = values in this interval not displayed.       No lab results found in last 7 days.      Recent Labs   Lab 01/06/25  0543   INR 1.30*       Recent Labs   Lab 01/06/25  0543   AST 35   ALT 11   ALKPHOS 424*   BILITOTAL 0.3   ALBUMIN 2.3*   INR 1.30*      Imaging:  No new imaging     A/P: 59yoM with h/o T2DM, HIV on ART, and recent admission from 12/17-12/19 for SBO managed successfully with bowel rest/GGC who presented to ED again on 12/20 for recurrent " SBO s/p ex lap/SBR on 12/20. Now s/p ex-lap/SBR/re-anastomosis for anastomotic leak on 12/24. Extubated 12/26 AM. Now with anterograde bowel function and improved oral intake.     POD# 16 - TCU referrals sent out, awaiting placement. Continue discharge planning.  Medically ready for discharge.     - Continue multimodal pain control   - Continue regular diet and Boost supplements  - Continue Biktarvy  - Wet to dry dressing changes BID  - Replete electrolytes PRN  - Starting Milk of Magnesia: Give 2 hours before or 6 hours after Biktarvy  - Dispo: TCU referrals sent out, still awaiting placement. Appreciate SW help.  Medically ready for discharge. Will follow-up with SWAT regarding Wet house vs Group home options.    Patient seen, examined and discussed with chief resident and resident team who will discuss with staff surgeon.     Viv Angel, MS3  [facility] [employer]    ----------------------------------------------------------------------------------------------  This note was reviewed and accurately reflects the discussion with staff and chief resident during rounds.    Koby Garrido MD  General Surgery, PGY-1  [facility]

## 2025-01-09 NOTE — PLAN OF CARE
Goal Outcome Evaluation:           Overall Patient Progress: no changeOverall Patient Progress: no change    Outcome Evaluation: Patient states pain in abdomen of 10 at 2100 and oxycodone given , pain of 8 an hour after given.  Blood sugar 161 and 158.    A:  Patient having pain abdomen.  Oxycodone given with decrease in pain.  Moves self in bed.    R:  continue to monitor and treat per plan of care.

## 2025-01-09 NOTE — PROGRESS NOTES
Addiction Medicine.    The addiction medicine team (Resident, Faculty, and ) came to the patient's room to introduce ourselves as patient advocates.  He declined to see us today.  I gave him my card and he said it was ok to try to visit him tomorrow to talk.  We will attempt to do that visit tomorrow and do the consult then.    Babak Monreal MD  Med/Peds  Addiction Medicine

## 2025-01-09 NOTE — PLAN OF CARE
Goal Outcome Evaluation:      Plan of Care Reviewed With: patient    Overall Patient Progress: improvingOverall Patient Progress: improving    Outcome Evaluation: patient alert and orientated x4. took shower today. dressing abd done twice today. patient keeps picking at the dressing tape RN looked every hour on rounding to make sure bandage still taped. did not feel like dinner tonight. according to patient he has not had a bm since this RN worked last which was sunday the 1/4. did send the surgery team a message regarding his bowl status. patient ambulated in halls with PT today. has been in a good mood. did voice he is getting ansty to leave. cont plan of care

## 2025-01-10 ENCOUNTER — APPOINTMENT (OUTPATIENT)
Dept: GENERAL RADIOLOGY | Facility: CLINIC | Age: 60
End: 2025-01-10
Payer: COMMERCIAL

## 2025-01-10 LAB
ANION GAP SERPL CALCULATED.3IONS-SCNC: 8 MMOL/L (ref 7–15)
BUN SERPL-MCNC: 21.5 MG/DL (ref 8–23)
CA-I BLD-MCNC: 4.5 MG/DL (ref 4.4–5.2)
CALCIUM SERPL-MCNC: 8.1 MG/DL (ref 8.8–10.4)
CHLORIDE SERPL-SCNC: 99 MMOL/L (ref 98–107)
CREAT SERPL-MCNC: 0.59 MG/DL (ref 0.67–1.17)
EGFRCR SERPLBLD CKD-EPI 2021: >90 ML/MIN/1.73M2
ERYTHROCYTE [DISTWIDTH] IN BLOOD BY AUTOMATED COUNT: 14 % (ref 10–15)
GLUCOSE BLDC GLUCOMTR-MCNC: 199 MG/DL (ref 70–99)
GLUCOSE BLDC GLUCOMTR-MCNC: 211 MG/DL (ref 70–99)
GLUCOSE BLDC GLUCOMTR-MCNC: 228 MG/DL (ref 70–99)
GLUCOSE BLDC GLUCOMTR-MCNC: 228 MG/DL (ref 70–99)
GLUCOSE SERPL-MCNC: 230 MG/DL (ref 70–99)
HCO3 SERPL-SCNC: 24 MMOL/L (ref 22–29)
HCT VFR BLD AUTO: 22.7 % (ref 40–53)
HGB BLD-MCNC: 7.3 G/DL (ref 13.3–17.7)
LACTATE SERPL-SCNC: 1.3 MMOL/L (ref 0.7–2)
MAGNESIUM SERPL-MCNC: 1.9 MG/DL (ref 1.7–2.3)
MCH RBC QN AUTO: 27.2 PG (ref 26.5–33)
MCHC RBC AUTO-ENTMCNC: 32.2 G/DL (ref 31.5–36.5)
MCV RBC AUTO: 85 FL (ref 78–100)
PHOSPHATE SERPL-MCNC: 3 MG/DL (ref 2.5–4.5)
PLATELET # BLD AUTO: 645 10E3/UL (ref 150–450)
POTASSIUM SERPL-SCNC: 3.9 MMOL/L (ref 3.4–5.3)
RBC # BLD AUTO: 2.68 10E6/UL (ref 4.4–5.9)
SODIUM SERPL-SCNC: 131 MMOL/L (ref 135–145)
WBC # BLD AUTO: 9.8 10E3/UL (ref 4–11)

## 2025-01-10 PROCEDURE — 250N000013 HC RX MED GY IP 250 OP 250 PS 637: Performed by: STUDENT IN AN ORGANIZED HEALTH CARE EDUCATION/TRAINING PROGRAM

## 2025-01-10 PROCEDURE — 80048 BASIC METABOLIC PNL TOTAL CA: CPT | Performed by: STUDENT IN AN ORGANIZED HEALTH CARE EDUCATION/TRAINING PROGRAM

## 2025-01-10 PROCEDURE — 99255 IP/OBS CONSLTJ NEW/EST HI 80: CPT | Performed by: INTERNAL MEDICINE

## 2025-01-10 PROCEDURE — 99233 SBSQ HOSP IP/OBS HIGH 50: CPT | Mod: 24 | Performed by: SURGERY

## 2025-01-10 PROCEDURE — 99418 PROLNG IP/OBS E/M EA 15 MIN: CPT | Performed by: INTERNAL MEDICINE

## 2025-01-10 PROCEDURE — 83735 ASSAY OF MAGNESIUM: CPT | Performed by: STUDENT IN AN ORGANIZED HEALTH CARE EDUCATION/TRAINING PROGRAM

## 2025-01-10 PROCEDURE — 250N000009 HC RX 250: Performed by: NURSE PRACTITIONER

## 2025-01-10 PROCEDURE — 84100 ASSAY OF PHOSPHORUS: CPT | Performed by: STUDENT IN AN ORGANIZED HEALTH CARE EDUCATION/TRAINING PROGRAM

## 2025-01-10 PROCEDURE — 82330 ASSAY OF CALCIUM: CPT | Performed by: STUDENT IN AN ORGANIZED HEALTH CARE EDUCATION/TRAINING PROGRAM

## 2025-01-10 PROCEDURE — 71045 X-RAY EXAM CHEST 1 VIEW: CPT

## 2025-01-10 PROCEDURE — 250N000013 HC RX MED GY IP 250 OP 250 PS 637: Performed by: NURSE PRACTITIONER

## 2025-01-10 PROCEDURE — 250N000013 HC RX MED GY IP 250 OP 250 PS 637

## 2025-01-10 PROCEDURE — 85041 AUTOMATED RBC COUNT: CPT

## 2025-01-10 PROCEDURE — 250N000011 HC RX IP 250 OP 636

## 2025-01-10 PROCEDURE — 83605 ASSAY OF LACTIC ACID: CPT | Performed by: STUDENT IN AN ORGANIZED HEALTH CARE EDUCATION/TRAINING PROGRAM

## 2025-01-10 PROCEDURE — 36592 COLLECT BLOOD FROM PICC: CPT | Performed by: STUDENT IN AN ORGANIZED HEALTH CARE EDUCATION/TRAINING PROGRAM

## 2025-01-10 PROCEDURE — 74018 RADEX ABDOMEN 1 VIEW: CPT

## 2025-01-10 PROCEDURE — 71045 X-RAY EXAM CHEST 1 VIEW: CPT | Mod: 26 | Performed by: RADIOLOGY

## 2025-01-10 PROCEDURE — 120N000002 HC R&B MED SURG/OB UMMC

## 2025-01-10 PROCEDURE — 85014 HEMATOCRIT: CPT

## 2025-01-10 PROCEDURE — 250N000011 HC RX IP 250 OP 636: Performed by: STUDENT IN AN ORGANIZED HEALTH CARE EDUCATION/TRAINING PROGRAM

## 2025-01-10 PROCEDURE — 250N000013 HC RX MED GY IP 250 OP 250 PS 637: Performed by: SURGERY

## 2025-01-10 PROCEDURE — 74018 RADEX ABDOMEN 1 VIEW: CPT | Mod: 26 | Performed by: STUDENT IN AN ORGANIZED HEALTH CARE EDUCATION/TRAINING PROGRAM

## 2025-01-10 RX ORDER — PANTOPRAZOLE SODIUM 40 MG/1
40 TABLET, DELAYED RELEASE ORAL
Status: DISCONTINUED | OUTPATIENT
Start: 2025-01-11 | End: 2025-01-20 | Stop reason: HOSPADM

## 2025-01-10 RX ORDER — MAGNESIUM OXIDE 400 MG/1
400 TABLET ORAL EVERY 4 HOURS
Status: COMPLETED | OUTPATIENT
Start: 2025-01-10 | End: 2025-01-10

## 2025-01-10 RX ORDER — BISACODYL 10 MG
10 SUPPOSITORY, RECTAL RECTAL ONCE
Status: COMPLETED | OUTPATIENT
Start: 2025-01-10 | End: 2025-01-10

## 2025-01-10 RX ORDER — BISACODYL 10 MG
10 SUPPOSITORY, RECTAL RECTAL DAILY PRN
Status: DISCONTINUED | OUTPATIENT
Start: 2025-01-10 | End: 2025-01-12

## 2025-01-10 RX ORDER — SIMETHICONE 80 MG
80 TABLET,CHEWABLE ORAL 4 TIMES DAILY
Status: DISCONTINUED | OUTPATIENT
Start: 2025-01-10 | End: 2025-01-20 | Stop reason: HOSPADM

## 2025-01-10 RX ADMIN — ACETAMINOPHEN 975 MG: 325 TABLET, FILM COATED ORAL at 22:14

## 2025-01-10 RX ADMIN — OXYCODONE HYDROCHLORIDE 5 MG: 5 TABLET ORAL at 18:15

## 2025-01-10 RX ADMIN — BICTEGRAVIR SODIUM, EMTRICITABINE, AND TENOFOVIR ALAFENAMIDE FUMARATE 1 TABLET: 50; 200; 25 TABLET ORAL at 14:22

## 2025-01-10 RX ADMIN — Medication 5 ML: at 09:09

## 2025-01-10 RX ADMIN — SENNOSIDES 2 TABLET: 8.6 TABLET, FILM COATED ORAL at 08:48

## 2025-01-10 RX ADMIN — ACETAMINOPHEN 975 MG: 325 TABLET, FILM COATED ORAL at 06:10

## 2025-01-10 RX ADMIN — ACETAMINOPHEN 975 MG: 325 TABLET, FILM COATED ORAL at 14:12

## 2025-01-10 RX ADMIN — BISACODYL 10 MG: 10 SUPPOSITORY RECTAL at 11:16

## 2025-01-10 RX ADMIN — MAGNESIUM HYDROXIDE 30 ML: 400 SUSPENSION ORAL at 09:55

## 2025-01-10 RX ADMIN — INSULIN GLARGINE 24 UNITS: 100 INJECTION, SOLUTION SUBCUTANEOUS at 08:47

## 2025-01-10 RX ADMIN — METHOCARBAMOL TABLETS 500 MG: 500 TABLET, COATED ORAL at 22:14

## 2025-01-10 RX ADMIN — OXYCODONE HYDROCHLORIDE 5 MG: 5 TABLET ORAL at 06:10

## 2025-01-10 RX ADMIN — SIMETHICONE 80 MG: 80 TABLET, CHEWABLE ORAL at 16:10

## 2025-01-10 RX ADMIN — ENOXAPARIN SODIUM 40 MG: 40 INJECTION SUBCUTANEOUS at 19:53

## 2025-01-10 RX ADMIN — OXYCODONE HYDROCHLORIDE 5 MG: 5 TABLET ORAL at 14:12

## 2025-01-10 RX ADMIN — Medication 15 ML: at 14:22

## 2025-01-10 RX ADMIN — QUETIAPINE FUMARATE 50 MG: 50 TABLET ORAL at 22:14

## 2025-01-10 RX ADMIN — IBUPROFEN 600 MG: 600 TABLET ORAL at 10:07

## 2025-01-10 RX ADMIN — OXYCODONE HYDROCHLORIDE 5 MG: 5 TABLET ORAL at 02:15

## 2025-01-10 RX ADMIN — METHOCARBAMOL TABLETS 500 MG: 500 TABLET, COATED ORAL at 14:12

## 2025-01-10 RX ADMIN — IBUPROFEN 600 MG: 600 TABLET ORAL at 02:15

## 2025-01-10 RX ADMIN — SIMETHICONE 80 MG: 80 TABLET, CHEWABLE ORAL at 11:16

## 2025-01-10 RX ADMIN — BISACODYL 10 MG: 10 SUPPOSITORY RECTAL at 08:47

## 2025-01-10 RX ADMIN — POLYETHYLENE GLYCOL 3350 17 G: 17 POWDER, FOR SOLUTION ORAL at 08:47

## 2025-01-10 RX ADMIN — PANTOPRAZOLE SODIUM 40 MG: 40 INJECTION, POWDER, FOR SOLUTION INTRAVENOUS at 08:48

## 2025-01-10 RX ADMIN — SIMETHICONE 80 MG: 80 TABLET, CHEWABLE ORAL at 19:55

## 2025-01-10 RX ADMIN — OXYCODONE HYDROCHLORIDE 5 MG: 5 TABLET ORAL at 22:14

## 2025-01-10 RX ADMIN — OXYCODONE HYDROCHLORIDE 5 MG: 5 TABLET ORAL at 10:07

## 2025-01-10 RX ADMIN — METHOCARBAMOL TABLETS 500 MG: 500 TABLET, COATED ORAL at 08:48

## 2025-01-10 RX ADMIN — IBUPROFEN 600 MG: 600 TABLET ORAL at 18:15

## 2025-01-10 ASSESSMENT — ACTIVITIES OF DAILY LIVING (ADL)
ADLS_ACUITY_SCORE: 36

## 2025-01-10 NOTE — PROGRESS NOTES
Care Management Follow Up    Length of Stay (days): 21  Expected Discharge Date: 01/09/2025  Concerns to be Addressed: discharge planning     Patient plan of care discussed at interdisciplinary rounds: Yes  Anticipated Discharge Disposition: Transitional Care  Anticipated Discharge Services: None  Anticipated Discharge DME: None  Patient/family educated on Medicare website which has current facility and service quality ratings: yes  Education Provided on the Discharge Plan: Yes  Patient/Family in Agreement with the Plan: yes  Referrals Placed by CM/SW: Post Acute Facilities  Private pay costs discussed: Not applicable  Discussed  Partnership in Safe Discharge Planning  document with patient/family: No   Handoff Completed: No, handoff not indicated or clinically appropriate  Background: Per Surgery Team, patient is medically ready for discharge.  Per PT disposition recommendations, TCU is recommended as patient is Ax1-2 pivot pending command following/lethargy and requires assistance for all OOB mobility.  Patient was previously at [facility] for CD treatment and would need to be completely independent to return to this treatment program.  Patient is unhoused at baseline and has limited supports and no [facility] program or worker involvement. Patient now has global TCU denials due to his psychosocial history. Pt was SWAT escalated on 1/8 for disposition assistance. Anticipate pt will remain in the hospital until less medically complex and with continued therapies so once is fully independent, will be able to pursue medical respite vs shelter vs return to CD treatment.     Additional Information: Per General Surgery Provider, this pt is wanting to leave the hospital or transfer to [facility]. If pt leaves the hospital, this would be considered an AMA. The provider is concerned about pursuing a transfer to [facility] because they do not have Gen Surgery. WOC could see pt weekly there and bedside nursing would have to conduct  "twice daily wet to dry dressing changes.    Resolved barriers:  IVABx discontinued    Barriers to discharge:   Chemical health treatment program/Medical Respite Shelter/Regular Shelter: Must be independent for all IADLs & ADLs (currently Ax1) and wound care changes (twice/day wet to dry changes)  TCU: Facilities who have a history of taking patients with TOMMY & other psychosocial challenges have already declined d/t the pt's active TOMMY.  [facility]: Pt has requested to be transferred to .  does not have surgery or WOC available.    Writer met with pt at bedside to provide efforts on securing TCU placement for the pt. Writer reviewed the pt's discharge barriers to this type of setting. Pt quickly presented as frustrated and flatly stated to this writer: \"I'm not interested in you..okay bye.\" Writer exited the room.    Next Steps: Care management team will continue to monitor.    RADHA Kamara, JOMAR  Clinical , [facility]  Desk Phone: [phone]   Schedule: Wednesday, Thursday, Fridays (for Mondays & Tuesdays, contact job share partner Sharon Nguyen)  Securely message with [employer] (Search Name or 7C Med Surg [id number] SW)  Text page via [employer] Paging/Directory   See signed in provider for up to date coverage information  Social Work & Care Management Department    Weekend And After Hours Care Management Contacts:  After Hours Social Work Coverage [phone] daily: Searchable in Vocera \"Adult SW After Hours On Call\"   Weekend Coverage [phone]: Searchable in Vocera \"7C Med Surg SW\" or \"7C Med Surg RNCC\"  "

## 2025-01-10 NOTE — PROGRESS NOTES
"Emergency General Surgery Progress Note   01/10/2025    Subjective  Patient feels okay. He endorsed moderate pain this morning without change from yesterday. Voiding spontaneously. Continues to pass gas. Patient feels bloated and has not had a bowel movement in a while. Would like to try a suppository today.     Objective  /73 (BP Location: Left arm, Patient Position: Semi-Post's, Cuff Size: Adult Regular)   Pulse 93   Temp 97.7  F (36.5  C) (Oral)   Resp 18   Ht 1.626 m (5' 4\")   Wt 70.4 kg (155 lb 3.2 oz)   SpO2 96%   BMI 26.64 kg/m      General: Alert, NAD  CV: RRR  Resp: No increased work of breathing on RA  Abd: Soft, appropriately tender, mild-moderately distended, midline incision dressing removed and changed. Incision c/d/I non-foul smelling, non-erythematous, without purulent drainage.  Ext: WWP    Recent Labs   Lab 01/09/25  0532 01/08/25  0627 01/07/25  0630   WBC 9.2 9.3 10.8   HGB 8.5* 7.8* 8.2*   * 653* 716*       Recent Labs   Lab 01/10/25  0208 01/09/25  2149 01/09/25  1811 01/09/25  0906 01/09/25  0532 01/08/25  0836 01/08/25  0627 01/07/25  1219 01/07/25  0630   NA  --   --   --   --  132*  --  132*  --  129*   POTASSIUM  --   --   --   --  4.1  --  3.9  --  4.2   CHLORIDE  --   --   --   --  99  --  98  --  97*   CO2  --   --   --   --  22  --  23  --  24   BUN  --   --   --   --  20.5  --  24.2*  --  18.3   CR  --   --   --   --  0.61*  --  0.74  --  0.63*   * 225* 177*   < > 172*   < > 234*   < > 139*   LINDA  --   --   --   --  8.1*  --  8.1*  --  8.0*   MAG  --   --   --   --  1.7  --  1.9  --  1.8   PHOS  --   --   --   --  3.3  --  2.7  --  3.1    < > = values in this interval not displayed.       No lab results found in last 7 days.      Recent Labs   Lab 01/06/25  0543   INR 1.30*       Recent Labs   Lab 01/06/25 0543   AST 35   ALT 11   ALKPHOS 424*   BILITOTAL 0.3   ALBUMIN 2.3*   INR 1.30*      Imaging:  No new imaging     A/P: 59yoM with h/o T2DM, HIV on " ART, and recent admission from 12/17-12/19 for SBO managed successfully with bowel rest/GGC who presented to ED again on 12/20 for recurrent SBO s/p ex lap/SBR on 12/20. Now s/p ex-lap/SBR/re-anastomosis for anastomotic leak on 12/24. Extubated 12/26 AM. Now with anterograde bowel function and improved oral intake.     POD# 17 - TCU referrals sent out, awaiting placement. Continue discharge planning.  Medically ready for discharge.     - Continue multimodal pain control   - Continue regular diet and Boost supplements  - Continue Biktarvy  - Wet to dry dressing changes BID  - Replete electrolytes PRN  - Continue Milk of Magnesia: Give 2 hours before or 6 hours after Biktarvy  - Ducolax suppository  - Dispo: TCU referrals sent out, still awaiting placement. Appreciate SW help.  Medically ready for discharge. Will follow-up with SWAT regarding Wet house vs Group home options. Appreciate Addiction medicine consult    Patient seen, examined and discussed with chief resident and resident team who will discuss with staff surgeon.     Viv Angel, MS3  [facility] [employer]    ----------------------------------------------------------------------------------------------  This note was reviewed and accurately reflects the discussion with staff and chief resident during rounds.    Koby Garrido MD  General Surgery, PGY-1  [facility]

## 2025-01-10 NOTE — PLAN OF CARE
Goal Outcome Evaluation:      Plan of Care Reviewed With: patient    Overall Patient Progress: no changeOverall Patient Progress: no change    Outcome Evaluation: Continue with POC    No acute changes. Aox4, VSS on RA. Refused abdominal dressing changed. PRN oxy and ibuprofen given as able. Using bedside urinal. Call light in reach and able to make needs known

## 2025-01-10 NOTE — CONSULTS
[facility]   Consult Note - Addiction Service     Date of Admission:  12/20/2024    Consult Requested by: Surgery  Reason for Consult: help with disposition    Assessment & Plan   The patient has a PMHx that includes with HIV/AIDS, recurrent SBO, DM2.    #Meth Use Disorder  Intermittent over the last 10 years. Last use was 2 months ago.  He has some motivation to quit but we were unable to go deep into that conversation before he was frustrated with my questions and aske me to leave.  He has knowledge about using new and clean needles.  The medical therapies for meth are limited and not always successful. Latest treatment options include injectable naltrexone plus oral bupropion. Usually treatment programs that include strategies like contingency management has higher rates of sobriety. He has been buproprion XL 150mg in the past.  I was not able to have this conversation with him to consider it again.  Currently he is taking opioids and thus naltrexone is not an option.  -Consider restarting buproprion XL 150mg if the patient is willing    # Harm Reduction:  # Immunization review:   Due to his history of IV use, consider checking hep C pcr reflex,  History of positive  Hep A IgG but Consider Hep B serologies     # Peer Support:   -Our peer  will meet the patient if agreeable and still hospitalized on Thursday, to provide additional outpatient resources  -To contact Malou Peer  from [facility] ([facility]): call or text: [phone]    # Addiction Social Worker:   Has been a difficult dispo issues for placement.  We can try to work with our addiction social worker, unfortunately she is not available today and won't be back until Monday.  Our addiction social worker Silas Brian can be contacted if needed, on her pager [phone] or texted/called at [phone]  --If Patient is interested in inpatient addiction treatment after discharge.   Please consult Chem Dependency within 1 week of discharge to begin this evaluation.  --If Patient is interested in ONLY outpatient addiction treatment after discharge.  Our addiction social worker will give the patient appropriate resources for outpatient evaluation.  --View this link for differences between Addiction Consult Team vs Chemical Dependency Consult [email or web address]     # Linkage to Care:   I was unable to ask about his interest in addiction medicine as outpatient.    The patient's care was discussed with the Patient.    I spent 120 minutes on the unit/floor managing the care of Andrew Collier. Over 50% of my time was spent on the following:   Significant education and counseling spent on: how substance use disorders and dependence occur, and how it can become a chronic relapsing and remitting medical condition.      Babak Monreal MD  [facility]   Contact information available via [facility] Paging/Directory  Please see sign in/sign out for up to date coverage information    ChAT team (Addiction Consult Team): Coverage daily 8-4pm     ______________________________________________________________________    Chief Complaint   Meth use    History is obtained from the patient    History of Present Illness   Andrew Collier who 58 yo with HIV/AIDS,  recurrent SBO.    Started usage in 2017 with their boyfriend at the time for recreational purposes  At point used on a daily basis.  Also have had extended periods of sobriety for months maybe years.  Sometimes uses for stress, weight loss  2019/2020 he went to detox treatment at [facility], which included sober living.     Last use was about 2 months ago.  He injects and uses clean needles.  Though in the past reporting smoking.  ______   Opioid use history: Denies  Alcohol use history: Endorses use in [place] prior to 1993, but states since then none  Withdrawal history: Endorse methamphetamine withdrawal  (namely prolonged sleeping)  Other substances used, including alcohol: None     Identified race/ethnicity:    Employed: No  Housing status: Prior immediate homelessness  HIV status: On Biktarvy,   Hep C status: Non-reactive on 8/2021  Hep A status: Unknown  Hep B status: 3x Immunizations (2016 and 2017)  Sexually active: Yes, multiple same-sex partners, uses protection      Review of Systems   The 10 point Review of Systems is negative other than noted in the HPI or here.     Past Medical History:   Diagnosis Date    Acute appendicitis with localized peritonitis 01/31/2018    AIDS (H)     Allergic rhinitis due to other allergen     DNS    Anal dysplasia     Chronic abdominal pain     CNS toxoplasmosis (H)     COVID-19 01/11/2022    Diabetes type 2, controlled (H)     GERD (gastroesophageal reflux disease)     Herpes zoster 09/23/2016    History of seizure     History of substance abuse (H)     HIV (human immunodeficiency virus infection) (H)     HLD (hyperlipidemia)     Lung nodules     Periungual wart     Pneumonia 01/06/2019    PTSD (post-traumatic stress disorder)     Sleep apnea     doesn't use CPAP       Past Surgical History:   Procedure Laterality Date    ANOSCOPY N/A 09/09/2020    Procedure: Exam Under Anesthesia, ANOSCOPY, fulgeration of rectal fissures with Rectal Biopsies;  Surgeon: Thanh Lundberg MD;  Location: [facility]    COLONOSCOPY Left 01/22/2016    Procedure: COMBINED COLONOSCOPY, SINGLE OR MULTIPLE BIOPSY/POLYPECTOMY BY BIOPSY;  Surgeon: Clark Saini MD;  Location: [facility]    ESOPHAGOSCOPY, GASTROSCOPY, DUODENOSCOPY (EGD), COMBINED N/A 06/06/2022    Procedure: ESOPHAGOGASTRODUODENOSCOPY, WITH BIOPSY;  Surgeon: Kecia Benítez MD;  Location: [facility]    ESOPHAGOSCOPY, GASTROSCOPY, DUODENOSCOPY (EGD), COMBINED N/A 10/10/2024    Procedure: Esophagoscopy, gastroscopy, duodenoscopy (EGD), combined;  Surgeon: Nico Bhatti DO;  Location: [facility]    HC EXPLORE UNDESC TESTIS,INGUIN/SCROTAL       LAPAROSCOPIC APPENDECTOMY N/A 01/31/2018    Procedure: LAPAROSCOPIC APPENDECTOMY;  LAPAROSCOPIC APPENDECTOMY;  Surgeon: Dawn Holt MD;  [facility]    LAPAROSCOPY DIAGNOSTIC (GENERAL) N/A 07/26/2016    Procedure: LAPAROSCOPY DIAGNOSTIC (GENERAL);  Surgeon: Susannah Arriaga MD;  [facility]    LAPAROSCOPY DIAGNOSTIC (GENERAL) N/A 04/16/2018    Procedure: LAPAROSCOPY DIAGNOSTIC (GENERAL);  Diagnostic laparoscopy and lysis of adhesions;  Surgeon: Prince Dowling MD;  Location: [facility]    OPTICAL TRACKING SYSTEM CRANIOTOMY, EXCISE TUMOR, COMBINED Left 04/10/2015    Procedure: COMBINED OPTICAL TRACKING SYSTEM CRANIOTOMY, EXCISE TUMOR;  Surgeon: Mirlande Colmenares MD;  [facility]    PICC INSERTION - TRIPLE LUMEN Right 12/26/2024    Right brachial medial vein 42cm total 4cm external.    REPAIR GAMEKEEPER'S THUMB Right 12/02/2016    Procedure: REPAIR LIGAMENT ULNAR COLLATERAL THUMB (GAMEKEEPER'S);  Surgeon: Evin Zamorano MD;  [facility]    RESECT SMALL BOWEL WITHOUT OSTOMY N/A 12/20/2024    Procedure: Laparotomy exploratory with small bowel resection.;  Surgeon: Christiano Obrien MD;  [facility]    RESECT SMALL BOWEL WITHOUT OSTOMY N/A 12/24/2024    Procedure: EXPLORATORY LAPAROTOMY, SMALL BOWEL RESECTION AND REANASTOMOSIS;  Surgeon: Shabnam Kate MD;  Location: [facility]    ZZC NONSPECIFIC PROCEDURE      right forearm fracture       Social History   Social History     Socioeconomic History    Marital status: Single     Spouse name: Not on file    Number of children: Not on file    Years of education: Not on file    Highest education level: Not on file   Occupational History    Occupation:      Comment: 5 years; temp agency    Occupation: Unemployed   Tobacco Use    Smoking status: Some Days     Current packs/day: 0.25     Average packs/day: 0.3 packs/day for 40.0 years (10.0 ttl pk-yrs)     Types: Cigarettes    Smokeless tobacco: Former    Substance and Sexual Activity    Alcohol use: No     Alcohol/week: 0.0 standard drinks of alcohol     Comment: Last etoh in 2007    Drug use: Not Currently     Types: Marijuana     Comment: chart indicated meth use but pt denies; states he does not use drugs    Sexual activity: Not Currently     Partners: Female, Male     Comment: Last sexual activity 2008   Other Topics Concern    Parent/sibling w/ CABG, MI or angioplasty before 65F 55M? Not Asked   Social History Narrative    Born in [facility].  Came to the [place] in 1993.  Last traveled to visit family in 2008.        1/7/2019 - Homeless. Working with a  at [employer] trying to get housing. Sexually active with two partners recently using condoms 100% of the time. Smokes occasionally, not for the last 4 weeks.        1/7/2020 at [facility] since 1/1/2020. 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  Care established with ID and endocrine     Social Drivers of Health     Financial Resource Strain: High Risk (12/21/2024)    Financial Resource Strain     Within the past 12 months, have you or your family members you live with been unable to get utilities (heat, electricity) when it was really needed?: Yes   Food Insecurity: Low Risk  (12/21/2024)    Food Insecurity     Within the past 12 months, did you worry that your food would run out before you got money to buy more?: No     Within the past 12 months, did the food you bought just not last and you didn t have money to get more?: No   Recent Concern: Food Insecurity - High Risk (10/24/2024)    Food Insecurity     Within the past 12 months, did you worry that your food would run out before you got money to buy more?: Yes     Within the past 12 months, did the food you bought just not last and you didn t have money to get more?: Yes   Transportation Needs: High Risk (12/21/2024)    Transportation Needs     Within the past 12 months, has lack of transportation kept you from medical appointments, getting your  medicines, non-medical meetings or appointments, work, or from getting things that you need?: Yes   Physical Activity: Not on file   Stress: Not on file   Social Connections: Unknown (4/10/2023)    Received from [facility], [facility]    Social Connections     Frequency of Communication with Friends and Family: Not on file   Interpersonal Safety: Low Risk  (12/21/2024)    Interpersonal Safety     Do you feel physically and emotionally safe where you currently live?: Yes     Within the past 12 months, have you been hit, slapped, kicked or otherwise physically hurt by someone?: No     Within the past 12 months, have you been humiliated or emotionally abused in other ways by your partner or ex-partner?: No   Recent Concern: Interpersonal Safety - High Risk (10/24/2024)    Interpersonal Safety     Do you feel physically and emotionally safe where you currently live?: No     Within the past 12 months, have you been hit, slapped, kicked or otherwise physically hurt by someone?: No     Within the past 12 months, have you been humiliated or emotionally abused in other ways by your partner or ex-partner?: No   Housing Stability: High Risk (12/21/2024)    Housing Stability     Do you have housing? : No     Are you worried about losing your housing?: Yes       Family History   I have reviewed this patient's family history and updated it with pertinent information if needed.  Family History   Problem Relation Age of Onset    Diabetes Brother     Diabetes Father     Alzheimer Disease Father     Unknown/Adopted Mother     Diabetes Paternal Grandfather     Cancer No family hx of         no skin cancer    Skin Cancer No family hx of         no famiy hx of skin cancer    Glaucoma No family hx of     Macular Degeneration No family hx of      Medications   I have reviewed this patient's current medications    Allergies   No Known Allergies    Physical Exam   Temp:  97.7  F (36.5  C) Temp src: Oral BP: 117/73 Pulse: 93   Resp: 18 SpO2: 96 % O2 Device: None (Room air) Oxygen Delivery: 1/2 LPM    Gen: no acute distress, well nourished, well developed  HEENT: NC/AT, MMM, EOMI,   CV: Extremities WWP, pulses assumed   Resp: breathing comfortably on RA  Abd: +BS, non-tender, non-distended, no guarding or rebound tenderness  Ext: No significant deformities or trauma, moving all ext freely  Skin: No erythema, no lesions or rashes.   Neuro: No focal neurologic deficit  Psych: Sometimes frustrated during the visit.    Due to regulation of Title 42 of the Code of Federal Regulations (CFR) Part 2: Confidentiality laws apply to this note and the information wherein.  Thus, this note cannot be copy and pasted into any other health care staff's note nor can it be included in general medical records sent to ANY outside agency without the patient's written consent.

## 2025-01-11 LAB
ANION GAP SERPL CALCULATED.3IONS-SCNC: 8 MMOL/L (ref 7–15)
BUN SERPL-MCNC: 16.3 MG/DL (ref 8–23)
CALCIUM SERPL-MCNC: 8.1 MG/DL (ref 8.8–10.4)
CHLORIDE SERPL-SCNC: 99 MMOL/L (ref 98–107)
CREAT SERPL-MCNC: 0.58 MG/DL (ref 0.67–1.17)
EGFRCR SERPLBLD CKD-EPI 2021: >90 ML/MIN/1.73M2
ERYTHROCYTE [DISTWIDTH] IN BLOOD BY AUTOMATED COUNT: 14 % (ref 10–15)
GLUCOSE BLDC GLUCOMTR-MCNC: 119 MG/DL (ref 70–99)
GLUCOSE BLDC GLUCOMTR-MCNC: 185 MG/DL (ref 70–99)
GLUCOSE BLDC GLUCOMTR-MCNC: 208 MG/DL (ref 70–99)
GLUCOSE BLDC GLUCOMTR-MCNC: 212 MG/DL (ref 70–99)
GLUCOSE SERPL-MCNC: 255 MG/DL (ref 70–99)
HCO3 SERPL-SCNC: 23 MMOL/L (ref 22–29)
HCT VFR BLD AUTO: 23.4 % (ref 40–53)
HGB BLD-MCNC: 7.3 G/DL (ref 13.3–17.7)
MAGNESIUM SERPL-MCNC: 1.7 MG/DL (ref 1.7–2.3)
MCH RBC QN AUTO: 26.8 PG (ref 26.5–33)
MCHC RBC AUTO-ENTMCNC: 31.2 G/DL (ref 31.5–36.5)
MCV RBC AUTO: 86 FL (ref 78–100)
PHOSPHATE SERPL-MCNC: 3.4 MG/DL (ref 2.5–4.5)
PLATELET # BLD AUTO: 660 10E3/UL (ref 150–450)
POTASSIUM SERPL-SCNC: 3.9 MMOL/L (ref 3.4–5.3)
RBC # BLD AUTO: 2.72 10E6/UL (ref 4.4–5.9)
SODIUM SERPL-SCNC: 130 MMOL/L (ref 135–145)
WBC # BLD AUTO: 9.8 10E3/UL (ref 4–11)

## 2025-01-11 PROCEDURE — 250N000013 HC RX MED GY IP 250 OP 250 PS 637: Performed by: NURSE PRACTITIONER

## 2025-01-11 PROCEDURE — 250N000013 HC RX MED GY IP 250 OP 250 PS 637: Performed by: STUDENT IN AN ORGANIZED HEALTH CARE EDUCATION/TRAINING PROGRAM

## 2025-01-11 PROCEDURE — 250N000011 HC RX IP 250 OP 636: Performed by: STUDENT IN AN ORGANIZED HEALTH CARE EDUCATION/TRAINING PROGRAM

## 2025-01-11 PROCEDURE — 250N000013 HC RX MED GY IP 250 OP 250 PS 637

## 2025-01-11 PROCEDURE — 85014 HEMATOCRIT: CPT

## 2025-01-11 PROCEDURE — 250N000011 HC RX IP 250 OP 636

## 2025-01-11 PROCEDURE — 84100 ASSAY OF PHOSPHORUS: CPT | Performed by: STUDENT IN AN ORGANIZED HEALTH CARE EDUCATION/TRAINING PROGRAM

## 2025-01-11 PROCEDURE — 36415 COLL VENOUS BLD VENIPUNCTURE: CPT

## 2025-01-11 PROCEDURE — 120N000002 HC R&B MED SURG/OB UMMC

## 2025-01-11 PROCEDURE — 250N000013 HC RX MED GY IP 250 OP 250 PS 637: Performed by: SURGERY

## 2025-01-11 PROCEDURE — 80048 BASIC METABOLIC PNL TOTAL CA: CPT

## 2025-01-11 PROCEDURE — 83735 ASSAY OF MAGNESIUM: CPT | Performed by: STUDENT IN AN ORGANIZED HEALTH CARE EDUCATION/TRAINING PROGRAM

## 2025-01-11 PROCEDURE — 85048 AUTOMATED LEUKOCYTE COUNT: CPT

## 2025-01-11 RX ORDER — MAGNESIUM OXIDE 400 MG/1
400 TABLET ORAL EVERY 4 HOURS
Status: COMPLETED | OUTPATIENT
Start: 2025-01-11 | End: 2025-01-11

## 2025-01-11 RX ADMIN — SIMETHICONE 80 MG: 80 TABLET, CHEWABLE ORAL at 20:40

## 2025-01-11 RX ADMIN — ENOXAPARIN SODIUM 40 MG: 40 INJECTION SUBCUTANEOUS at 20:40

## 2025-01-11 RX ADMIN — POLYETHYLENE GLYCOL 3350 17 G: 17 POWDER, FOR SOLUTION ORAL at 09:34

## 2025-01-11 RX ADMIN — OXYCODONE HYDROCHLORIDE 5 MG: 5 TABLET ORAL at 16:45

## 2025-01-11 RX ADMIN — PANTOPRAZOLE SODIUM 40 MG: 40 TABLET, DELAYED RELEASE ORAL at 09:33

## 2025-01-11 RX ADMIN — SENNOSIDES 2 TABLET: 8.6 TABLET, FILM COATED ORAL at 09:33

## 2025-01-11 RX ADMIN — SIMETHICONE 80 MG: 80 TABLET, CHEWABLE ORAL at 11:59

## 2025-01-11 RX ADMIN — BICTEGRAVIR SODIUM, EMTRICITABINE, AND TENOFOVIR ALAFENAMIDE FUMARATE 1 TABLET: 50; 200; 25 TABLET ORAL at 14:22

## 2025-01-11 RX ADMIN — ACETAMINOPHEN 975 MG: 325 TABLET, FILM COATED ORAL at 09:36

## 2025-01-11 RX ADMIN — ACETAMINOPHEN 975 MG: 325 TABLET, FILM COATED ORAL at 22:21

## 2025-01-11 RX ADMIN — METHOCARBAMOL TABLETS 500 MG: 500 TABLET, COATED ORAL at 09:37

## 2025-01-11 RX ADMIN — IBUPROFEN 600 MG: 600 TABLET ORAL at 10:38

## 2025-01-11 RX ADMIN — Medication 15 ML: at 11:59

## 2025-01-11 RX ADMIN — QUETIAPINE FUMARATE 50 MG: 50 TABLET ORAL at 22:22

## 2025-01-11 RX ADMIN — IBUPROFEN 600 MG: 600 TABLET ORAL at 17:55

## 2025-01-11 RX ADMIN — METHOCARBAMOL TABLETS 500 MG: 500 TABLET, COATED ORAL at 20:40

## 2025-01-11 RX ADMIN — MAGNESIUM OXIDE TAB 400 MG (241.3 MG ELEMENTAL MG) 400 MG: 400 (241.3 MG) TAB at 22:22

## 2025-01-11 RX ADMIN — METHOCARBAMOL TABLETS 500 MG: 500 TABLET, COATED ORAL at 14:22

## 2025-01-11 RX ADMIN — ACETAMINOPHEN 975 MG: 325 TABLET, FILM COATED ORAL at 14:22

## 2025-01-11 RX ADMIN — INSULIN GLARGINE 24 UNITS: 100 INJECTION, SOLUTION SUBCUTANEOUS at 09:38

## 2025-01-11 RX ADMIN — MAGNESIUM OXIDE TAB 400 MG (241.3 MG ELEMENTAL MG) 400 MG: 400 (241.3 MG) TAB at 17:55

## 2025-01-11 RX ADMIN — SIMETHICONE 80 MG: 80 TABLET, CHEWABLE ORAL at 16:34

## 2025-01-11 RX ADMIN — SIMETHICONE 80 MG: 80 TABLET, CHEWABLE ORAL at 09:33

## 2025-01-11 RX ADMIN — OXYCODONE HYDROCHLORIDE 5 MG: 5 TABLET ORAL at 09:32

## 2025-01-11 ASSESSMENT — ACTIVITIES OF DAILY LIVING (ADL)
ADLS_ACUITY_SCORE: 36

## 2025-01-11 NOTE — PLAN OF CARE
Goal Outcome Evaluation:      Plan of Care Reviewed With: patient    Overall Patient Progress: no changeOverall Patient Progress: no change    Outcome Evaluation: assumed cares from [id number]. Pt A&Ox4, labile and irritable on shift. refused VS, on RA denies SOB. pain managed with scheduled meds and prn po oxycodone. pt refused any other non pharm interventions charted. midline incision CDI. voiding spontaneously. pt refused a gown, wearing jacket pt brought. refused dinner tray. pt refused 2x trying to check BG. writer passed on to next RN to see if pt will agree with them. Pt medically ready for discharge, still waiting discharge disposition per surgery team note.

## 2025-01-11 NOTE — PROGRESS NOTES
Surgery Progress Note  01/11/2025       Subjective:  - Patient asleep at bedside, tolerating regular diet without NVBH, passing gas, BM yesterday provided significant relief, voiding without issue, ambulatory.     Objective:  Temp:  [97.5  F (36.4  C)-97.7  F (36.5  C)] 97.5  F (36.4  C)  Pulse:  [99] 99  Resp:  [18] 18  BP: (117)/(73-74) 117/74  SpO2:  [96 %-97 %] 97 %    I/O last 3 completed shifts:  In: 607 [P.O.:600; I.V.:7]  Out: 775 [Urine:775]      General: Alert, NAD  CV: RRR  Resp: No increased work of breathing on RA  Abd: Soft, appropriately tender, mild-moderately distended, midline incision dressing removed and changed. Incision c/d/I non-foul smelling, non-erythematous, without purulent drainage, has increasing fibrinous tissue.  Ext: WWP     Labs:  Recent Labs   Lab 01/10/25  0916 01/09/25  0532 01/08/25  0627   WBC 9.8 9.2 9.3   HGB 7.3* 8.5* 7.8*   * 628* 653*       Recent Labs   Lab 01/11/25  0201 01/10/25  1906 01/10/25  1430 01/10/25  0916 01/09/25  0906 01/09/25  0532 01/08/25  0836 01/08/25  0627   NA  --   --   --  131*  --  132*  --  132*   POTASSIUM  --   --   --  3.9  --  4.1  --  3.9   CHLORIDE  --   --   --  99  --  99  --  98   CO2  --   --   --  24  --  22  --  23   BUN  --   --   --  21.5  --  20.5  --  24.2*   CR  --   --   --  0.59*  --  0.61*  --  0.74   * 228* 199* 230*   < > 172*   < > 234*   LINDA  --   --   --  8.1*  --  8.1*  --  8.1*   MAG  --   --   --  1.9  --  1.7  --  1.9   PHOS  --   --   --  3.0  --  3.3  --  2.7    < > = values in this interval not displayed.       Imaging:  Exam: XR CHEST PORT 1 VIEW, 1/10/2025 10:44 AM     Indication: Hx pleural effusions, new onset abdominal pain     Comparison: Chest radiograph 12/31/2024, additional priors     Findings:   An AP radiograph of the chest was obtained. Right PICC line terminates  in the cavoatrial junction. Stable cardiomegaly. Small right pleural  effusion. No significant left pleural effusion. No  pneumothorax. Near  resolution of right middle and lower zone patchy opacities. Persistent  streaky bilateral perihilar opacities. Reticular opacities in the left  upper lung field are more prominent. No acute osseous abnormality.  Metallic densities and radiopaque tubular structure crossing the left  hemidiaphragm posteriorly favored to be external.                                                                      Impression:   1. More prominent reticular opacities in left upper lung fields better  characterized on CT from 12/24/2024, may represent infection versus  scarring.  2. Improved opacities in the right middle and lower lung fields.  Persistent bilateral streaky opacities suggestive of atelectasis  and/or edema.  3. Stable cardiomegaly.    EXAMINATION:  XR ABDOMEN PORT 1 VIEW 1/10/2025      COMPARISON: CT abdomen/pelvis 12/30/2024.     HISTORY: Increasing new abdominal pain     TECHNIQUE: Frontal supine view of the abdomen.     FINDINGS: Postsurgical changes of bowel resection and anastomosis  better appreciated on CT. Penile prosthesis. One loop of air distended  small bowel in the midabdomen above the upper limit of normal without  overt obstructive pattern. No abnormal distention of the colon which  has a moderate stool burden.  No portal venous gas.                                                                        IMPRESSION:   Mildly air distended loops of small and large bowel is nonspecific. No  high-grade obstruction.     Assessment/Plan:   59yoM with h/o T2DM, HIV on ART, and recent admission from 12/17-12/19 for SBO managed successfully with bowel rest/GGC who presented to ED again on 12/20 for recurrent SBO s/p ex lap/SBR on 12/20. Now s/p ex-lap/SBR/re-anastomosis for anastomotic leak on 12/24. Extubated 12/26 AM. Now with anterograde bowel function and improved oral intake.      POD# 19 - TCU referrals sent out, awaiting placement. Continue discharge planning.  Medically ready for  discharge.      - Continue multimodal pain control   - Moderate Protein-Calorie Malnutrition: Continue regular diet and Boost supplements  - Continue Biktarvy  - Wet to dry dressing changes BID  - Replete electrolytes PRN  - Bowel Regimen: Miralax BID, Senna BID, Milk of Mag BID, Dulcolax suppository PRN  - Endocrinology: Appreciate IP management and discharge recommendations  - Ambulate QID: Goal to be off 1:1 assist  - WOC consult  - Ongoing discussions: WOC VAC vs dressing changes vs Medical respit for discharge.  - Dispo: TCU referrals sent out, still awaiting placement. Appreciate SW help.  Medically ready for discharge. Continue to follow-up with SWAT regarding Wet house vs Group home options.     Seen, examined, and discussed with chief resident, who will discuss with staff.  - - - - - - - - - - - - - - - - - -  Koby Garrido MD  General Surgery, PGY-1  Emergency General Surgery

## 2025-01-11 NOTE — PLAN OF CARE
Goal Outcome Evaluation:      Plan of Care Reviewed With: patient    Overall Patient Progress: improving    Outcome Evaluation: Pt A&O, labile, gets irritated easily. VSS on RA. Up w/ SBA. Ibuprofen and oxy given for incision pain. Dressing changed on abd x1 this shift. Fair appetite. No BM this shift. Voids with urinal. Mag replacement started. Waiting for placement.

## 2025-01-11 NOTE — PLAN OF CARE
Goal Outcome Evaluation:      Plan of Care Reviewed With: patient    Overall Patient Progress: no changeOverall Patient Progress: no change     Pt is A&O, V/S stable, assist x 1, on room air. Pt refused assessment. Denies pain. No major events during the night. Continue plan of care.

## 2025-01-11 NOTE — PLAN OF CARE
Goal Outcome Evaluation:      Plan of Care Reviewed With: patient    Overall Patient Progress: no change    Outcome Evaluation: Pt A&O. Labile, can be aggressive with staff at times. Refuses cares at times. VSS on RA. Up w/ SBA. Oxy and ibuprofen given for pain at incision sites. Dressing changed on abd this evening. Received suppository, had 1 BM. Voids with urinal. Triple lumen PICC- hep locked. Will cont to monitor.

## 2025-01-12 ENCOUNTER — APPOINTMENT (OUTPATIENT)
Dept: GENERAL RADIOLOGY | Facility: CLINIC | Age: 60
End: 2025-01-12
Payer: COMMERCIAL

## 2025-01-12 LAB
ANION GAP SERPL CALCULATED.3IONS-SCNC: 9 MMOL/L (ref 7–15)
BUN SERPL-MCNC: 18.2 MG/DL (ref 8–23)
CALCIUM SERPL-MCNC: 8.3 MG/DL (ref 8.8–10.4)
CHLORIDE SERPL-SCNC: 101 MMOL/L (ref 98–107)
CREAT SERPL-MCNC: 0.6 MG/DL (ref 0.67–1.17)
EGFRCR SERPLBLD CKD-EPI 2021: >90 ML/MIN/1.73M2
ERYTHROCYTE [DISTWIDTH] IN BLOOD BY AUTOMATED COUNT: 14 % (ref 10–15)
GLUCOSE BLDC GLUCOMTR-MCNC: 117 MG/DL (ref 70–99)
GLUCOSE BLDC GLUCOMTR-MCNC: 134 MG/DL (ref 70–99)
GLUCOSE BLDC GLUCOMTR-MCNC: 163 MG/DL (ref 70–99)
GLUCOSE BLDC GLUCOMTR-MCNC: 176 MG/DL (ref 70–99)
GLUCOSE BLDC GLUCOMTR-MCNC: 197 MG/DL (ref 70–99)
GLUCOSE SERPL-MCNC: 196 MG/DL (ref 70–99)
HCO3 SERPL-SCNC: 23 MMOL/L (ref 22–29)
HCT VFR BLD AUTO: 22.3 % (ref 40–53)
HGB BLD-MCNC: 7.4 G/DL (ref 13.3–17.7)
MAGNESIUM SERPL-MCNC: 1.7 MG/DL (ref 1.7–2.3)
MCH RBC QN AUTO: 27.6 PG (ref 26.5–33)
MCHC RBC AUTO-ENTMCNC: 33.2 G/DL (ref 31.5–36.5)
MCV RBC AUTO: 83 FL (ref 78–100)
PHOSPHATE SERPL-MCNC: 3.2 MG/DL (ref 2.5–4.5)
PLATELET # BLD AUTO: 690 10E3/UL (ref 150–450)
POTASSIUM SERPL-SCNC: 3.8 MMOL/L (ref 3.4–5.3)
RBC # BLD AUTO: 2.68 10E6/UL (ref 4.4–5.9)
SODIUM SERPL-SCNC: 133 MMOL/L (ref 135–145)
WBC # BLD AUTO: 10.6 10E3/UL (ref 4–11)

## 2025-01-12 PROCEDURE — 84100 ASSAY OF PHOSPHORUS: CPT | Performed by: STUDENT IN AN ORGANIZED HEALTH CARE EDUCATION/TRAINING PROGRAM

## 2025-01-12 PROCEDURE — 250N000013 HC RX MED GY IP 250 OP 250 PS 637: Performed by: STUDENT IN AN ORGANIZED HEALTH CARE EDUCATION/TRAINING PROGRAM

## 2025-01-12 PROCEDURE — 250N000013 HC RX MED GY IP 250 OP 250 PS 637

## 2025-01-12 PROCEDURE — 85027 COMPLETE CBC AUTOMATED: CPT

## 2025-01-12 PROCEDURE — 80048 BASIC METABOLIC PNL TOTAL CA: CPT

## 2025-01-12 PROCEDURE — 250N000013 HC RX MED GY IP 250 OP 250 PS 637: Performed by: SURGERY

## 2025-01-12 PROCEDURE — 250N000011 HC RX IP 250 OP 636

## 2025-01-12 PROCEDURE — 99232 SBSQ HOSP IP/OBS MODERATE 35: CPT | Mod: 24 | Performed by: SURGERY

## 2025-01-12 PROCEDURE — 83735 ASSAY OF MAGNESIUM: CPT | Performed by: STUDENT IN AN ORGANIZED HEALTH CARE EDUCATION/TRAINING PROGRAM

## 2025-01-12 PROCEDURE — 120N000002 HC R&B MED SURG/OB UMMC

## 2025-01-12 PROCEDURE — 250N000013 HC RX MED GY IP 250 OP 250 PS 637: Performed by: NURSE PRACTITIONER

## 2025-01-12 PROCEDURE — 74018 RADEX ABDOMEN 1 VIEW: CPT

## 2025-01-12 PROCEDURE — 250N000011 HC RX IP 250 OP 636: Performed by: STUDENT IN AN ORGANIZED HEALTH CARE EDUCATION/TRAINING PROGRAM

## 2025-01-12 PROCEDURE — 36592 COLLECT BLOOD FROM PICC: CPT

## 2025-01-12 RX ORDER — MAGNESIUM OXIDE 400 MG/1
400 TABLET ORAL EVERY 4 HOURS
Status: COMPLETED | OUTPATIENT
Start: 2025-01-12 | End: 2025-01-12

## 2025-01-12 RX ORDER — BISACODYL 10 MG
10 SUPPOSITORY, RECTAL RECTAL DAILY
Status: DISCONTINUED | OUTPATIENT
Start: 2025-01-12 | End: 2025-01-20 | Stop reason: HOSPADM

## 2025-01-12 RX ORDER — OXYCODONE HYDROCHLORIDE 5 MG/1
5 TABLET ORAL EVERY 6 HOURS PRN
Status: DISCONTINUED | OUTPATIENT
Start: 2025-01-12 | End: 2025-01-20 | Stop reason: HOSPADM

## 2025-01-12 RX ORDER — POTASSIUM CHLORIDE 750 MG/1
20 TABLET, EXTENDED RELEASE ORAL ONCE
Status: COMPLETED | OUTPATIENT
Start: 2025-01-12 | End: 2025-01-12

## 2025-01-12 RX ADMIN — LIDOCAINE 4% 1 PATCH: 40 PATCH TOPICAL at 20:32

## 2025-01-12 RX ADMIN — OXYCODONE HYDROCHLORIDE 5 MG: 5 TABLET ORAL at 17:09

## 2025-01-12 RX ADMIN — SIMETHICONE 80 MG: 80 TABLET, CHEWABLE ORAL at 20:12

## 2025-01-12 RX ADMIN — BICTEGRAVIR SODIUM, EMTRICITABINE, AND TENOFOVIR ALAFENAMIDE FUMARATE 1 TABLET: 50; 200; 25 TABLET ORAL at 14:06

## 2025-01-12 RX ADMIN — OXYCODONE HYDROCHLORIDE 5 MG: 5 TABLET ORAL at 23:39

## 2025-01-12 RX ADMIN — BISACODYL 10 MG: 10 SUPPOSITORY RECTAL at 10:48

## 2025-01-12 RX ADMIN — SIMETHICONE 80 MG: 80 TABLET, CHEWABLE ORAL at 12:53

## 2025-01-12 RX ADMIN — METHOCARBAMOL TABLETS 500 MG: 500 TABLET, COATED ORAL at 14:06

## 2025-01-12 RX ADMIN — SIMETHICONE 80 MG: 80 TABLET, CHEWABLE ORAL at 16:30

## 2025-01-12 RX ADMIN — ACETAMINOPHEN 975 MG: 325 TABLET, FILM COATED ORAL at 07:00

## 2025-01-12 RX ADMIN — MAGNESIUM OXIDE TAB 400 MG (241.3 MG ELEMENTAL MG) 400 MG: 400 (241.3 MG) TAB at 14:06

## 2025-01-12 RX ADMIN — SENNOSIDES 2 TABLET: 8.6 TABLET, FILM COATED ORAL at 09:51

## 2025-01-12 RX ADMIN — MINERAL OIL 226 ML: 1 OIL ORAL at 01:04

## 2025-01-12 RX ADMIN — Medication 5 ML: at 05:56

## 2025-01-12 RX ADMIN — MAGNESIUM OXIDE TAB 400 MG (241.3 MG ELEMENTAL MG) 400 MG: 400 (241.3 MG) TAB at 09:50

## 2025-01-12 RX ADMIN — IBUPROFEN 600 MG: 600 TABLET ORAL at 16:30

## 2025-01-12 RX ADMIN — ACETAMINOPHEN 975 MG: 325 TABLET, FILM COATED ORAL at 14:06

## 2025-01-12 RX ADMIN — INSULIN GLARGINE 24 UNITS: 100 INJECTION, SOLUTION SUBCUTANEOUS at 09:56

## 2025-01-12 RX ADMIN — ENOXAPARIN SODIUM 40 MG: 40 INJECTION SUBCUTANEOUS at 20:12

## 2025-01-12 RX ADMIN — POTASSIUM CHLORIDE 20 MEQ: 750 TABLET, EXTENDED RELEASE ORAL at 09:50

## 2025-01-12 RX ADMIN — QUETIAPINE FUMARATE 50 MG: 50 TABLET ORAL at 22:36

## 2025-01-12 RX ADMIN — METHOCARBAMOL TABLETS 500 MG: 500 TABLET, COATED ORAL at 09:50

## 2025-01-12 RX ADMIN — METHOCARBAMOL TABLETS 500 MG: 500 TABLET, COATED ORAL at 20:12

## 2025-01-12 RX ADMIN — PANTOPRAZOLE SODIUM 40 MG: 40 TABLET, DELAYED RELEASE ORAL at 09:50

## 2025-01-12 RX ADMIN — POLYETHYLENE GLYCOL 3350 17 G: 17 POWDER, FOR SOLUTION ORAL at 09:52

## 2025-01-12 RX ADMIN — Medication 15 ML: at 12:53

## 2025-01-12 RX ADMIN — ACETAMINOPHEN 975 MG: 325 TABLET, FILM COATED ORAL at 22:36

## 2025-01-12 RX ADMIN — MAGNESIUM HYDROXIDE 30 ML: 400 SUSPENSION ORAL at 09:52

## 2025-01-12 RX ADMIN — SIMETHICONE 80 MG: 80 TABLET, CHEWABLE ORAL at 09:51

## 2025-01-12 RX ADMIN — OXYCODONE HYDROCHLORIDE 5 MG: 5 TABLET ORAL at 09:50

## 2025-01-12 ASSESSMENT — ACTIVITIES OF DAILY LIVING (ADL)
ADLS_ACUITY_SCORE: 36

## 2025-01-12 NOTE — PROGRESS NOTES
Surgery Progress Note  01/12/2025       Subjective:  Interval events:  Still having gas pain. Feeling bloating and not eating due to that. Denies nausea and vomiting.     Objective:  Temp:  [97.7  F (36.5  C)-98.6  F (37  C)] 97.7  F (36.5  C)  Pulse:  [] 102  Resp:  [16-20] 16  BP: (124-142)/(70-83) 126/83  SpO2:  [96 %-97 %] 96 %    I/O last 3 completed shifts:  In: 1210 [P.O.:1200; I.V.:10]  Out: 1175 [Urine:1175]      General: Alert, NAD  CV: RRR  Resp: No increased work of breathing on RA  Abd: Soft, appropriately tender, mild-moderately distended, midline incision dressing removed and changed. Incision c/d/I non-foul smelling, non-erythematous, without purulent drainage, with fibrinous tissue.  Ext: WWP     Labs:  Recent Labs   Lab 01/12/25  0609 01/11/25  1214 01/10/25  0916   WBC 10.6 9.8 9.8   HGB 7.4* 7.3* 7.3*   * 660* 645*       Recent Labs   Lab 01/12/25  1029 01/12/25  0609 01/12/25  0152 01/11/25  1602 01/11/25  1214 01/10/25  1430 01/10/25  0916   NA  --  133*  --   --  130*  --  131*   POTASSIUM  --  3.8  --   --  3.9  --  3.9   CHLORIDE  --  101  --   --  99  --  99   CO2  --  23  --   --  23  --  24   BUN  --  18.2  --   --  16.3  --  21.5   CR  --  0.60*  --   --  0.58*  --  0.59*   * 196* 176*   < > 255*   < > 230*   LINDA  --  8.3*  --   --  8.1*  --  8.1*   MAG  --  1.7  --   --  1.7  --  1.9   PHOS  --  3.2  --   --  3.4  --  3.0    < > = values in this interval not displayed.       Imaging:  XR ABDOMEN PORT 1 VIEW 1/12/2025     IMPRESSION:   Non-obstructive bowel gas pattern.    Assessment/Plan:   59yoM with h/o T2DM, HIV on ART, and recent admission from 12/17-12/19 for SBO managed successfully with bowel rest/GGC who presented to ED again on 12/20 for recurrent SBO s/p ex lap/SBR on 12/20. Now s/p ex-lap/SBR/re-anastomosis for anastomotic leak on 12/24. Extubated 12/26 AM. Now with anterograde bowel function and improved oral intake.      POD# 23/ #19  - AXR with evidence  of colonic stool burden. Scheduled suppository. Continue discharge planning. Medically ready for discharge. Addiction medicine SW to help with placement.      - Scheduled suppository and soap suds enema.  - Continue multimodal pain control   - Moderate Protein-Calorie Malnutrition: Continue regular diet and Boost supplements  - Continue Biktarvy  - Wet to dry dressing changes BID  - Replete electrolytes PRN  - Bowel Regimen: Miralax BID, Senna BID, Milk of Mag BID, Dulcolax suppository PRN  - Endocrinology: Appreciate IP management and discharge recommendations  - Ambulate QID: Goal to be off 1:1 assist  - WOC consult  - Ongoing discussions: WOC VAC vs dressing changes vs Medical respit for discharge.  - Dispo: TCU referrals sent out, still awaiting placement. Appreciate SW help.  Medically ready for discharge. Continue to follow-up with SWAT regarding Wet house vs Group home options.     Seen, examined, and discussed with chief resident, who will discuss with staff.  - - - - - - - - - - - - - - - - - -  Av Cormier MD  Urology, PGY-1 on EGS Service

## 2025-01-12 NOTE — PLAN OF CARE
"Goal Outcome Evaluation:      Plan of Care Reviewed With: patient    Overall Patient Progress: no change    Outcome Evaluation: Pt A&O, gets irritated easily. VSS on RA. Up w/ SBA+W to BR. Ibuprofen and oxy given for incisional/abd pain. Received suppository. Had 2 BMs this shift. Voids spont. Poor appetite today. Mag and K replaced, recheck tomorrow AM. Refuses to go on walks, reports of being either dizzy or \"tired\" when asked. R triple lumen-hep locked. Waiting for placement.      "

## 2025-01-12 NOTE — PLAN OF CARE
"Goal Outcome Evaluation:           Overall Patient Progress: no changeOverall Patient Progress: no change    Outcome Evaluation: Pt A&Ox4, mood labile on shift. VSS on RA. Pain managed with scheduled meds. Midline ab incision CDI, dressing changed on shift. Pt ambulate frequently to bathroom SBA on shift, steady. Pt refused stool softeners despite education of importance to maintain BM. At 2350 pt requested enema after 3x attempts for a BM but felt constipated. Refused other BM med prn offered. Provider paged and enema given, small BM. Pt wanted shower after enema. Pt loudly told writer to take shower chair out of bathroom and refused to let writer cover incision dressing and PICC. Writer stated safety concern to shower without the chair and covering. Pt stated \"I know how to shower\". Pt stated \"if you don't get out I'm going to spray you with the fucking water\" Writer stood by bathroom with door open to ensure pt safety. Pt did not get PICC dressing wet, only showered lower half of body. Plan is still waiting TCU placement.      "

## 2025-01-13 ENCOUNTER — APPOINTMENT (OUTPATIENT)
Dept: PHYSICAL THERAPY | Facility: CLINIC | Age: 60
End: 2025-01-13
Payer: COMMERCIAL

## 2025-01-13 LAB
ALBUMIN SERPL BCG-MCNC: 2.3 G/DL (ref 3.5–5.2)
ALP SERPL-CCNC: 320 U/L (ref 40–150)
ALT SERPL W P-5'-P-CCNC: 13 U/L (ref 0–70)
ANION GAP SERPL CALCULATED.3IONS-SCNC: 10 MMOL/L (ref 7–15)
AST SERPL W P-5'-P-CCNC: 30 U/L (ref 0–45)
BILIRUB SERPL-MCNC: 0.2 MG/DL
BUN SERPL-MCNC: 18.2 MG/DL (ref 8–23)
CALCIUM SERPL-MCNC: 8.3 MG/DL (ref 8.8–10.4)
CHLORIDE SERPL-SCNC: 101 MMOL/L (ref 98–107)
CREAT SERPL-MCNC: 0.65 MG/DL (ref 0.67–1.17)
EGFRCR SERPLBLD CKD-EPI 2021: >90 ML/MIN/1.73M2
ERYTHROCYTE [DISTWIDTH] IN BLOOD BY AUTOMATED COUNT: 14.1 % (ref 10–15)
ERYTHROCYTE [DISTWIDTH] IN BLOOD BY AUTOMATED COUNT: 14.2 % (ref 10–15)
GLUCOSE BLDC GLUCOMTR-MCNC: 128 MG/DL (ref 70–99)
GLUCOSE BLDC GLUCOMTR-MCNC: 134 MG/DL (ref 70–99)
GLUCOSE BLDC GLUCOMTR-MCNC: 143 MG/DL (ref 70–99)
GLUCOSE BLDC GLUCOMTR-MCNC: 165 MG/DL (ref 70–99)
GLUCOSE SERPL-MCNC: 132 MG/DL (ref 70–99)
HCO3 SERPL-SCNC: 22 MMOL/L (ref 22–29)
HCT VFR BLD AUTO: 16.4 % (ref 40–53)
HCT VFR BLD AUTO: 24.4 % (ref 40–53)
HGB BLD-MCNC: 5.2 G/DL (ref 13.3–17.7)
HGB BLD-MCNC: 7.8 G/DL (ref 13.3–17.7)
INR PPP: 1.11 (ref 0.85–1.15)
MAGNESIUM SERPL-MCNC: 1.7 MG/DL (ref 1.7–2.3)
MCH RBC QN AUTO: 26.9 PG (ref 26.5–33)
MCH RBC QN AUTO: 27.2 PG (ref 26.5–33)
MCHC RBC AUTO-ENTMCNC: 31.7 G/DL (ref 31.5–36.5)
MCHC RBC AUTO-ENTMCNC: 32 G/DL (ref 31.5–36.5)
MCV RBC AUTO: 85 FL (ref 78–100)
MCV RBC AUTO: 85 FL (ref 78–100)
PHOSPHATE SERPL-MCNC: 3.8 MG/DL (ref 2.5–4.5)
PLATELET # BLD AUTO: 714 10E3/UL (ref 150–450)
PLATELET # BLD AUTO: 786 10E3/UL (ref 150–450)
POTASSIUM SERPL-SCNC: 3.7 MMOL/L (ref 3.4–5.3)
PREALB SERPL-MCNC: 10.9 MG/DL (ref 20–40)
PROT SERPL-MCNC: 6.8 G/DL (ref 6.4–8.3)
RBC # BLD AUTO: 1.93 10E6/UL (ref 4.4–5.9)
RBC # BLD AUTO: 2.87 10E6/UL (ref 4.4–5.9)
SODIUM SERPL-SCNC: 133 MMOL/L (ref 135–145)
TRIGL SERPL-MCNC: 170 MG/DL
WBC # BLD AUTO: 11.3 10E3/UL (ref 4–11)
WBC # BLD AUTO: 13 10E3/UL (ref 4–11)

## 2025-01-13 PROCEDURE — 36415 COLL VENOUS BLD VENIPUNCTURE: CPT

## 2025-01-13 PROCEDURE — G0463 HOSPITAL OUTPT CLINIC VISIT: HCPCS

## 2025-01-13 PROCEDURE — 250N000011 HC RX IP 250 OP 636

## 2025-01-13 PROCEDURE — 250N000013 HC RX MED GY IP 250 OP 250 PS 637: Performed by: STUDENT IN AN ORGANIZED HEALTH CARE EDUCATION/TRAINING PROGRAM

## 2025-01-13 PROCEDURE — 36592 COLLECT BLOOD FROM PICC: CPT | Performed by: SURGERY

## 2025-01-13 PROCEDURE — 84134 ASSAY OF PREALBUMIN: CPT | Performed by: SURGERY

## 2025-01-13 PROCEDURE — 84100 ASSAY OF PHOSPHORUS: CPT | Performed by: SURGERY

## 2025-01-13 PROCEDURE — 250N000011 HC RX IP 250 OP 636: Performed by: STUDENT IN AN ORGANIZED HEALTH CARE EDUCATION/TRAINING PROGRAM

## 2025-01-13 PROCEDURE — 250N000013 HC RX MED GY IP 250 OP 250 PS 637

## 2025-01-13 PROCEDURE — 97530 THERAPEUTIC ACTIVITIES: CPT | Mod: GP

## 2025-01-13 PROCEDURE — 250N000013 HC RX MED GY IP 250 OP 250 PS 637: Performed by: SURGERY

## 2025-01-13 PROCEDURE — 83735 ASSAY OF MAGNESIUM: CPT | Performed by: SURGERY

## 2025-01-13 PROCEDURE — 82947 ASSAY GLUCOSE BLOOD QUANT: CPT | Performed by: SURGERY

## 2025-01-13 PROCEDURE — 85049 AUTOMATED PLATELET COUNT: CPT | Performed by: STUDENT IN AN ORGANIZED HEALTH CARE EDUCATION/TRAINING PROGRAM

## 2025-01-13 PROCEDURE — 97116 GAIT TRAINING THERAPY: CPT | Mod: GP

## 2025-01-13 PROCEDURE — 99232 SBSQ HOSP IP/OBS MODERATE 35: CPT | Mod: GC | Performed by: INTERNAL MEDICINE

## 2025-01-13 PROCEDURE — 36592 COLLECT BLOOD FROM PICC: CPT | Performed by: STUDENT IN AN ORGANIZED HEALTH CARE EDUCATION/TRAINING PROGRAM

## 2025-01-13 PROCEDURE — 85018 HEMOGLOBIN: CPT

## 2025-01-13 PROCEDURE — 85027 COMPLETE CBC AUTOMATED: CPT

## 2025-01-13 PROCEDURE — 120N000002 HC R&B MED SURG/OB UMMC

## 2025-01-13 PROCEDURE — 250N000013 HC RX MED GY IP 250 OP 250 PS 637: Performed by: NURSE PRACTITIONER

## 2025-01-13 PROCEDURE — 85610 PROTHROMBIN TIME: CPT | Performed by: SURGERY

## 2025-01-13 PROCEDURE — 84478 ASSAY OF TRIGLYCERIDES: CPT | Performed by: SURGERY

## 2025-01-13 RX ADMIN — ACETAMINOPHEN 975 MG: 325 TABLET, FILM COATED ORAL at 06:15

## 2025-01-13 RX ADMIN — Medication 5 ML: at 23:49

## 2025-01-13 RX ADMIN — SIMETHICONE 80 MG: 80 TABLET, CHEWABLE ORAL at 09:06

## 2025-01-13 RX ADMIN — OXYCODONE HYDROCHLORIDE 5 MG: 5 TABLET ORAL at 18:52

## 2025-01-13 RX ADMIN — METHOCARBAMOL TABLETS 500 MG: 500 TABLET, COATED ORAL at 21:37

## 2025-01-13 RX ADMIN — Medication 5 ML: at 12:29

## 2025-01-13 RX ADMIN — SENNOSIDES 2 TABLET: 8.6 TABLET, FILM COATED ORAL at 18:52

## 2025-01-13 RX ADMIN — IBUPROFEN 600 MG: 600 TABLET ORAL at 09:06

## 2025-01-13 RX ADMIN — Medication 5 MG: at 18:53

## 2025-01-13 RX ADMIN — MAGNESIUM HYDROXIDE 30 ML: 400 SUSPENSION ORAL at 09:06

## 2025-01-13 RX ADMIN — OXYCODONE HYDROCHLORIDE 5 MG: 5 TABLET ORAL at 06:43

## 2025-01-13 RX ADMIN — POLYETHYLENE GLYCOL 3350 17 G: 17 POWDER, FOR SOLUTION ORAL at 09:06

## 2025-01-13 RX ADMIN — METHOCARBAMOL TABLETS 500 MG: 500 TABLET, COATED ORAL at 09:06

## 2025-01-13 RX ADMIN — SIMETHICONE 80 MG: 80 TABLET, CHEWABLE ORAL at 16:04

## 2025-01-13 RX ADMIN — SIMETHICONE 80 MG: 80 TABLET, CHEWABLE ORAL at 21:48

## 2025-01-13 RX ADMIN — Medication 5 ML: at 16:04

## 2025-01-13 RX ADMIN — ENOXAPARIN SODIUM 40 MG: 40 INJECTION SUBCUTANEOUS at 21:37

## 2025-01-13 RX ADMIN — ACETAMINOPHEN 975 MG: 325 TABLET, FILM COATED ORAL at 14:16

## 2025-01-13 RX ADMIN — IBUPROFEN 600 MG: 600 TABLET ORAL at 16:04

## 2025-01-13 RX ADMIN — POLYETHYLENE GLYCOL 3350 17 G: 17 POWDER, FOR SOLUTION ORAL at 18:53

## 2025-01-13 RX ADMIN — INSULIN GLARGINE 24 UNITS: 100 INJECTION, SOLUTION SUBCUTANEOUS at 09:06

## 2025-01-13 RX ADMIN — LIDOCAINE 4% 1 PATCH: 40 PATCH TOPICAL at 21:37

## 2025-01-13 RX ADMIN — OXYCODONE HYDROCHLORIDE 5 MG: 5 TABLET ORAL at 12:39

## 2025-01-13 RX ADMIN — PANTOPRAZOLE SODIUM 40 MG: 40 TABLET, DELAYED RELEASE ORAL at 09:06

## 2025-01-13 RX ADMIN — METHOCARBAMOL TABLETS 500 MG: 500 TABLET, COATED ORAL at 16:04

## 2025-01-13 RX ADMIN — BICTEGRAVIR SODIUM, EMTRICITABINE, AND TENOFOVIR ALAFENAMIDE FUMARATE 1 TABLET: 50; 200; 25 TABLET ORAL at 14:16

## 2025-01-13 RX ADMIN — MAGNESIUM HYDROXIDE 30 ML: 400 SUSPENSION ORAL at 21:48

## 2025-01-13 RX ADMIN — SENNOSIDES 2 TABLET: 8.6 TABLET, FILM COATED ORAL at 09:06

## 2025-01-13 RX ADMIN — QUETIAPINE FUMARATE 50 MG: 50 TABLET ORAL at 21:48

## 2025-01-13 RX ADMIN — IBUPROFEN 600 MG: 600 TABLET ORAL at 23:11

## 2025-01-13 RX ADMIN — SIMETHICONE 80 MG: 80 TABLET, CHEWABLE ORAL at 12:39

## 2025-01-13 RX ADMIN — ACETAMINOPHEN 975 MG: 325 TABLET, FILM COATED ORAL at 21:48

## 2025-01-13 ASSESSMENT — ACTIVITIES OF DAILY LIVING (ADL)
ADLS_ACUITY_SCORE: 36

## 2025-01-13 NOTE — PROGRESS NOTES
Addiction Team Social Work Note    LATE ENTRY     Date of  Intervention: 01/9/25  Collaborated with:  Dr. Monreal, Addiction Attending; Dr. Schultz, Addiction Resident; patient    Referred by: Surgery  Reason for consult: help with disposition  Patient information: Pt is a 59 year old with PMHx that includes HIV/AIDS, recurrent SBO, DM2, and methamphetamine use disorder    Intervention:  Chart reviewed.  Dr. Schultz met with pt initially however pt not receptive of visit.      Dr. Monreal and writer met with pt for a supportive visit.  Pt sleepy.  Introduced self and role explaining that writer can assist with psychosocial needs like housing, finances, etc.  He stated he was receptive to writer's visit but at a later time.    Assessment:   Support, advocacy.    Plan:    Anticipated Disposition:  To be determined.      Follow Up:  Writer will continue to follow.    Due to regulation of Title 42 of the Code of Federal Regulations (CFR) Part 2: Confidentiality laws apply to this note and the information wherein.  Thus, this note cannot be copy and pasted into any other health care staff's note nor can it be included in general medical records sent to ANY outside agency without the patient's written consent.    JOMAR Tompkins  She/her/hers  Social Work Services  Inpatient Addiction Medicine Team  [phone] work cell phone  [phone] pager  8:00am-4:30pm M/T/Th/F; Off Wednesday's

## 2025-01-13 NOTE — PROGRESS NOTES
Care Management Follow Up    Length of Stay (days): 24  Expected Discharge Date: 01/15/2025  Concerns to be Addressed: discharge planning     Patient plan of care discussed at interdisciplinary rounds: Yes  Anticipated Discharge Disposition: Transitional Care  Anticipated Discharge Services: None  Anticipated Discharge DME: None  Patient/family educated on Medicare website which has current facility and service quality ratings: yes  Education Provided on the Discharge Plan: Yes  Patient/Family in Agreement with the Plan: yes  Referrals Placed by CM/SW: Post Acute Facilities    Active Referrals: N/A     Inactive Referrals:  [facility] Global Referral - Global denial d/t IV drug use & need for a PICC & TPN.  [facility] Global Referral - Global denial d/t the pt's TOMMY/CD treatment needs  [facility]- cannot meet patient's needs   [facility] Global Referral- cannot meet patient's needs, history of violence and/or drug use or alcohol abuse   [facility]-history of violence and/or drug use or alcohol abuse   [facility]- pt no longer LTACH appropriate  [facility] LTACH- pt no longer LTACH appropriate    Private pay costs discussed: Not applicable    Discussed  Partnership in Safe Discharge Planning  document with patient/family: Yes     Handoff Completed: No, handoff not indicated or clinically appropriate    Additional Information:  Per Surgery Team, patient is medically ready for discharge.  Per PT disposition recommendations, TCU is recommended as patient is Ax1-2 pivot pending command following/lethargy and requires assistance for all OOB mobility.  SW spoke with PT Marcus Ku who indicated that pt is declining to work with PT, however, pt set off bed alarm as PT was leaving.  When PT returned after hearing bed alarm, pt stated that he wanted to go for a walk but now that PT had returned, pt declined to go for a walk.    Patient was previously at [facility] for CD treatment and  would need to be completely independent to return to this treatment program.  Patient is unhoused at baseline and has limited supports and no county program or worker involvement. Patient now has global TCU denials due to his psychosocial history. Pt was SWAT escalated on 1/8 for disposition assistance.  DAYNA noted today, 1/13, that SWAT escalation form may not have been received and DAYNA resent form to leadership. DAYNA consulted with specialty DAYNA Brian regarding additional discharge options and resources. Anticipate pt will remain in the hospital until less medically complex and with continued therapies so once is fully independent, will be able to pursue medical respite vs shelter vs return to CD treatment.     Next Steps:  Social work will continue to follow for safe discharge and collaborate with SWAT for any guidance.    __________________________     RADHA Larson, CRISTAL  , [facility]  [address] [id number]   Desk Phone: [phone]   Securely message with [employer] (Search Name or  Med Surg [id number] )  Text page via [facility] Paging/Directory   See signed in provider for up to date coverage information  Social Work & Care Management Department

## 2025-01-13 NOTE — PROGRESS NOTES
Addiction Team Social Work Note    Date of  Intervention: 01/13/25  Collaborated with:  Dr. Monreal, Addiction Attending; Dr. Hill, Addiction Resident; patient; Sharon, [facility]    Patient information: Writer following to assist with psychosocial stressors that are creating barrier to safe discharge planning.    Intervention:  Chart reviewed.  Discussed pt's care and plan in Addiction team rounds today.      Writer and Dr. Hill met with pt for a supportive visit.  Re-introduced self and role.  Pt more awake and receptive to our visit.     He states he would like help with housing and shares with us some housing that is pertinent to what he's looking for in the future.  Ideally, he would like a one-bedroom apartment and tells writer he does not need any supportive services on-site.  Pt shares that many years ago he lived in HIV-specific housing.  He thought things were going well and he got along well with the , however he was asked to leave this housing and was homeless for the next 7 years.  This was very upsetting to him given that he thought things were going well there.  He asks that he not go to any HIV-specific housing.  He shares that in the recent past he lived at [facility].  He states he also was asked to leave there and that they have his furniture and belongings.  Pt wonders what happened with his things.  He states he does not want to return to [facility] given its a high-use area.      Most recently, he states he was at [facility].      Pt states he might have a Housing Worker or applications for housing into [employer].  Pt states they may also have a program that will give him a cell phone.  He states he has not had a cell phone for 5 years.    When talking about [facility] housing, writer asked pt about [facility].  He states he would not stay there due to it being a high-use area.  He gives writer  verbal permission to contact [facility].    Writer contacted [facility] [phone].  She states that if pt is not currently staying in one of their [facility] shelters he would have to go through [facility] or [facility] Coordinated Entry first.  She suggests writer contact Coordinated Entry.    Writer submitted an online request for [facility] Coordinated Entry/Streets to Housing program.    Writer talked with JOMAR Herrera, about above.  Writer did not mention this to patient yet, but if pt wants to continue TOMMY treatment, could refer him to [facility] Inpatient TOMMY tx program.  They will consider patient's experiencing medical issues.        If pt changes his mind to consider HIV-specific housing, writer would pursue:  If you are living with HIV or AIDS+, contact [facility] by calling [phone] or by emailing [email or web address].  *Writer called this number and it is Rhea's direct office number.  Her voicemail directs general Intake questions about Mariel[facility] Jolley to [phone].  Writer called and left a voicemail.       Assessment:   Support; resources.    Plan:    Anticipated Disposition:  To be determined.    Follow Up:  Writer will continue to follow.    Due to regulation of Title 42 of the Code of Federal Regulations (CFR) Part 2: Confidentiality laws apply to this note and the information wherein.  Thus, this note cannot be copy and pasted into any other health care staff's note nor can it be included in general medical records sent to ANY outside agency without the patient's written consent.    JOMAR Tompkins  She/her/hers  Social Work Services  Inpatient Addiction Medicine Team  [phone] work cell phone  [phone] pager  8:00am-4:30pm M/T/Th/F; Off Wednesday's

## 2025-01-13 NOTE — PROGRESS NOTES
Surgery Progress Note  01/13/2025       Subjective:  Interval events:  Still reporting abdominal pain. Decreased PO intake yesterday. Denies nausea and vomiting. Patient remains in bed and refuses walks with nursing.      Objective:  Temp:  [97.4  F (36.3  C)-98.7  F (37.1  C)] 97.5  F (36.4  C)  Pulse:  [94-97] 97  Resp:  [18] 18  BP: (126-140)/(73-87) 137/81  SpO2:  [98 %-99 %] 98 %    I/O last 3 completed shifts:  In: 480 [P.O.:480]  Out: 1125 [Urine:1125]      General: Alert, NAD  CV: RRR  Resp: No increased work of breathing on RA  Abd: Soft, appropriately tender, mild-moderately distended, midline incision dressing removed and changed. Incision c/d/I non-foul smelling, non-erythematous, without purulent drainage, with fibrinous tissue.  Ext: WWP     Labs:  Recent Labs   Lab 01/12/25  0609 01/11/25  1214 01/10/25  0916   WBC 10.6 9.8 9.8   HGB 7.4* 7.3* 7.3*   * 660* 645*       Recent Labs   Lab 01/13/25  0752 01/13/25  0220 01/12/25  2148 01/12/25  1029 01/12/25  0609 01/11/25  1602 01/11/25  1214 01/10/25  1430 01/10/25  0916   NA  --   --   --   --  133*  --  130*  --  131*   POTASSIUM  --   --   --   --  3.8  --  3.9  --  3.9   CHLORIDE  --   --   --   --  101  --  99  --  99   CO2  --   --   --   --  23  --  23  --  24   BUN  --   --   --   --  18.2  --  16.3  --  21.5   CR  --   --   --   --  0.60*  --  0.58*  --  0.59*   * 134* 117*   < > 196*   < > 255*   < > 230*   LINDA  --   --   --   --  8.3*  --  8.1*  --  8.1*   MAG  --   --   --   --  1.7  --  1.7  --  1.9   PHOS  --   --   --   --  3.2  --  3.4  --  3.0    < > = values in this interval not displayed.       Imaging:  XR ABDOMEN PORT 1 VIEW 1/12/2025     IMPRESSION:   Non-obstructive bowel gas pattern.    Assessment/Plan:   59yoM with h/o T2DM, HIV on ART, and recent admission from 12/17-12/19 for SBO managed successfully with bowel rest/GGC who presented to ED again on 12/20 for recurrent SBO s/p ex lap/SBR on 12/20. Now s/p  ex-lap/SBR/re-anastomosis for anastomotic leak on 12/24. Extubated 12/26 AM. Now with anterograde bowel function and improved oral intake.      POD# 24/ #20  - AXR with evidence of colonic stool burden. Scheduled suppository. Continue discharge planning. Medically ready for discharge. Addiction medicine SW to help with placement.      - Scheduled suppository and soap suds enema.  - Continue multimodal pain control   - Moderate Protein-Calorie Malnutrition: Continue regular diet and Boost supplements  - Continue Biktarvy  - Wet to dry dressing changes BID  - Replete electrolytes PRN  - Bowel Regimen: Miralax BID, Senna BID, Milk of Mag BID, Dulcolax suppository PRN  - Endocrinology: Appreciate IP management and discharge recommendations  - Ambulate QID: Goal to be off 1:1 assist  - WOC consult  - Ongoing discussions: WOC VAC vs dressing changes vs Medical respit for discharge.  - Dispo: TCU referrals sent out, still awaiting placement. Appreciate SW help.  Medically ready for discharge. Continue to follow-up with SWAT regarding Wet house vs Group home options.     Seen, examined, and discussed with chief resident, who will discuss with staff.  - - - - - - - - - - - - - - - - - -  Abe Hansen DO, MS   General Surgery, PGY 1

## 2025-01-13 NOTE — PROGRESS NOTES
[facility]   Consult Note - Addiction Service     Date of Admission:  12/20/2024    Consult Requested by: Surgery  Reason for Consult: help with disposition    Assessment & Plan   The patient has a PMHx that includes with HIV/AIDS, recurrent SBO, DM2.    #Meth Use Disorder  Intermittent over the last 10 years. Last use was 2 months ago. Some motivation to quit but again we did not go deep into that conversation as he became slightly frustrated talking about housing. Did discuss in days prior knowledge about using new and clean needles.  Further that the medical therapies for methamphetamines are limited and not always successful. Latest treatment options include injectable naltrexone plus oral bupropion. Currently he is taking opioids and thus naltrexone is not an option. Otherwise previously he has been on bupropion which could be resume if Andrew would want.  -If open to it, consider restarting buproprion  mg daily     #Lactose intolerance  Reports increased pain related to eating dairy products while hospitalized.   - Consider adding lactacid prn with meals     # Harm Reduction:  # Immunization review:   Due to his history of IV use, consider checking hep C pcr reflex,  History of positive  Hep A IgG but consider Hep B serologies     # Peer Support:   -Our peer  will meet the patient if agreeable and still hospitalized on Thursday, to provide additional outpatient resources  -To contact Malou Peer  from [facility]): call or text: [phone]    # Addiction Social Worker:   Difficult disposition in the setting of placement, but will discuss with Addition team  Silas.  --If he becomes interested in inpatient addiction treatment after discharge.  Please consult Chem Dependency within 1 week of discharge to begin this evaluation.  --If he is interested in ONLY outpatient addiction treatment after discharge.  Our  addiction social worker will give the patient appropriate resources for outpatient evaluation.    # Linkage to Care:   Will continue to approach about outpatient care from a substance use standpoint.      Care discussed with Dr. Jocelin Hill MD  IM PGY3    ChAT team (Addiction Consult Team): Coverage daily 8-4pm   ____________________________________________________________________    S:  - Continued abdominal pain, but reports opioids cause him to sweat  - Continues to be tired  - Is passing gas, but no bowel movement  - Open to finding new housing, away from prior areas of use to avoid relapse     Review of Systems   The 10 point Review of Systems is negative other than noted in the HPI or here.     Physical Exam   Temp: 97.5  F (36.4  C) Temp src: Oral BP: 137/81 (MAP-98) Pulse: 97   Resp: 18 SpO2: 98 % O2 Device: None (Room air)      Gen: No acute distress, well nourished, well developed  HEENT: NC/AT, MMM  CV: Extremities WWP  Resp: Breathing comfortably on RA  Abd: Slight tenderness to palpation over the right side of the abdomen, non-distended, no guarding or rebound tenderness  Ext: No significant deformities or trauma, moving all ext freely  Neuro: No focal neurologic deficit  Psych: Frustrated during the visit while discussing prior housing    Due to regulation of Title 42 of the Code of Federal Regulations (CFR) Part 2: Confidentiality laws apply to this note and the information wherein.  Thus, this note cannot be copy and pasted into any other health care staff's note nor can it be included in general medical records sent to ANY outside agency without the patient's written consent.

## 2025-01-13 NOTE — PLAN OF CARE
Goal Outcome Evaluation:      Plan of Care Reviewed With: patient    Overall Patient Progress: no changeOverall Patient Progress: no change    Outcome Evaluation: alert and orientated. patient complained of pain in his rt lower abd towards the back. warm pack applied. up walking the unit. refused suppository educated importance of taking his bowel medications has had no bm today is passing gas. dressing changed to abdomin. wound nurse came to access if another wound vac needs to be replaced. hgb was redrawn and came back at 7.7

## 2025-01-13 NOTE — PLAN OF CARE
Goal Outcome Evaluation:      Plan of Care Reviewed With: patient    Overall Patient Progress: no changeOverall Patient Progress: no change    Outcome Evaluation: Pt A&Ox4, VSS on RA. Pain managed with scheduled meds and prn po oxycodone for ab pain. Midline ab incision CDI, reinforced. 1x BM on shift, voiding spontaneously. Pt refused evening BM meds, pt education importance to maintain BM regimen to avoid continuing constipation, still refused. Plan is pt medically ready for discharge per surgery note, still pending discharge disposition.

## 2025-01-13 NOTE — CONSULTS
[facility]  WO Nurse Inpatient Assessment     Consulted for: Midline incision, consult for vac placement  1/13 abdominal midline incision, judgement for possible VAC or not, also given disposition situation    Summery: Evaluated pt for vac placement but still not appropriate at this time due to extensive exposed fascia. Will Try putting hydrofera blue comfort cell in the wound bed for better antimicrobial coverage if missed dressing changes.     Patient History (according to provider note(s):      59yoM with h/o T2DM, HIV on ART, and recent admission from 12/17-12/19 for SBO managed successfully with bowel rest/GGC who presented to ED again on 12/20 for recurrent SBO s/p ex lap/SBR on 12/20. Now s/p ex-lap/SBR/re-anastomosis for anastomotic leak on 12/24. Extubated 12/26 AM.     Assessment:      Areas visualized during today's visit: Abdomen    Negative pressure wound therapy applied to: midline abdomen    Last photo: 1/6   Wound due to: Surgical Wound   Wound base: 60 % marbled Granulation tissue and Muscle, 40% fibrin vs slough. Exposed sutures with wound break down present in the wound base. Intact fascia in place under the exposed sutures     Palpation of the wound bed: normal       Drainage: moderate      Description of drainage: serosanguinous      Measurements (length x width x depth, in cm) 19  x 3.5  x  2.5 cm       Tunneling N/A      Undermining up to 0.5 cm at 12 o'clock  Periwound skin: Intact       Color: normal and consistent with surrounding tissue       Temperature: normal    Odor: none   Pain: moderate, aching and tender   Pain intervention prior to dressing change: oral oxycodone and slow and gentle cares   Treatment goal: Increase granulation and Protection  STATUS: evolving and deteriorating   Supplies ordered: gathered and at bedside    Treatment Plan:     Midline wound:  Daily and PRN  Gently cleanse wound with Vashe (#[id number]) moistened gauze and  pat dry. Apply no sting barrier film to tammy-wound area and allow to dry.   Gently pack hydrofera blue comfortcel (#[id number]) into wound. Cover with ABD pad and secure with Medipore tape.     Orders: Updated    RECOMMEND PRIMARY TEAM ORDER: None, at this time  Education provided: plan of care  Discussed plan of care with: Patient and Nurse  WO nurse follow-up plan: weekly  Notify WOC if wound(s) deteriorate.  Nursing to notify the Provider(s) and re-consult the WOC Nurse if new skin concern.    DATA:     Current support surface: Standard  Standard gel mattress (Isoflex)  Containment of urine/stool: Incontinence Protocol  BMI: Body mass index is 26.71 kg/m .   Active diet order: Orders Placed This Encounter      Regular Diet Adult     Output: I/O last 3 completed shifts:  In: 10 [I.V.:10]  Out: 750 [Urine:750]     Labs:   Recent Labs   Lab 01/13/25  1332 01/13/25  1235   ALBUMIN  --  2.3*   PREALB  --  10.9*   HGB 7.8* 5.2*   INR  --  1.11   WBC 11.3* 13.0*     Pressure injury risk assessment:   Sensory Perception: 3-->slightly limited  Moisture: 3-->occasionally moist  Activity: 3-->walks occasionally  Mobility: 3-->slightly limited  Nutrition: 3-->adequate  Friction and Shear: 2-->potential problem  Estevan Score: 17    Pager no longer in use, please contact through [email or web address] group: [facility] Nurse [place]    Dept. Office Number: [phone]

## 2025-01-14 ENCOUNTER — APPOINTMENT (OUTPATIENT)
Dept: PHYSICAL THERAPY | Facility: CLINIC | Age: 60
End: 2025-01-14
Payer: COMMERCIAL

## 2025-01-14 LAB
ERYTHROCYTE [DISTWIDTH] IN BLOOD BY AUTOMATED COUNT: 14.1 % (ref 10–15)
GLUCOSE BLDC GLUCOMTR-MCNC: 131 MG/DL (ref 70–99)
GLUCOSE BLDC GLUCOMTR-MCNC: 131 MG/DL (ref 70–99)
GLUCOSE BLDC GLUCOMTR-MCNC: 156 MG/DL (ref 70–99)
GLUCOSE BLDC GLUCOMTR-MCNC: 163 MG/DL (ref 70–99)
GLUCOSE BLDC GLUCOMTR-MCNC: 166 MG/DL (ref 70–99)
HCT VFR BLD AUTO: 23.2 % (ref 40–53)
HGB BLD-MCNC: 7.7 G/DL (ref 13.3–17.7)
HOLD SPECIMEN: NORMAL
MAGNESIUM SERPL-MCNC: 2.1 MG/DL (ref 1.7–2.3)
MCH RBC QN AUTO: 27.5 PG (ref 26.5–33)
MCHC RBC AUTO-ENTMCNC: 33.2 G/DL (ref 31.5–36.5)
MCV RBC AUTO: 83 FL (ref 78–100)
PHOSPHATE SERPL-MCNC: 4.2 MG/DL (ref 2.5–4.5)
PLATELET # BLD AUTO: 624 10E3/UL (ref 150–450)
PLATELET # BLD AUTO: 664 10E3/UL (ref 150–450)
POTASSIUM SERPL-SCNC: 4.1 MMOL/L (ref 3.4–5.3)
RBC # BLD AUTO: 2.8 10E6/UL (ref 4.4–5.9)
WBC # BLD AUTO: 10.9 10E3/UL (ref 4–11)

## 2025-01-14 PROCEDURE — 250N000013 HC RX MED GY IP 250 OP 250 PS 637: Performed by: SURGERY

## 2025-01-14 PROCEDURE — 97116 GAIT TRAINING THERAPY: CPT | Mod: GP

## 2025-01-14 PROCEDURE — 250N000013 HC RX MED GY IP 250 OP 250 PS 637: Performed by: STUDENT IN AN ORGANIZED HEALTH CARE EDUCATION/TRAINING PROGRAM

## 2025-01-14 PROCEDURE — 85014 HEMATOCRIT: CPT

## 2025-01-14 PROCEDURE — 250N000011 HC RX IP 250 OP 636

## 2025-01-14 PROCEDURE — 84100 ASSAY OF PHOSPHORUS: CPT | Performed by: SURGERY

## 2025-01-14 PROCEDURE — 250N000013 HC RX MED GY IP 250 OP 250 PS 637

## 2025-01-14 PROCEDURE — 97530 THERAPEUTIC ACTIVITIES: CPT | Mod: GP

## 2025-01-14 PROCEDURE — 120N000002 HC R&B MED SURG/OB UMMC

## 2025-01-14 PROCEDURE — 250N000013 HC RX MED GY IP 250 OP 250 PS 637: Performed by: NURSE PRACTITIONER

## 2025-01-14 PROCEDURE — 83735 ASSAY OF MAGNESIUM: CPT | Performed by: SURGERY

## 2025-01-14 PROCEDURE — 36415 COLL VENOUS BLD VENIPUNCTURE: CPT

## 2025-01-14 PROCEDURE — 84132 ASSAY OF SERUM POTASSIUM: CPT | Performed by: SURGERY

## 2025-01-14 PROCEDURE — 250N000011 HC RX IP 250 OP 636: Performed by: STUDENT IN AN ORGANIZED HEALTH CARE EDUCATION/TRAINING PROGRAM

## 2025-01-14 RX ORDER — SIMETHICONE 80 MG
80 TABLET,CHEWABLE ORAL ONCE
Status: COMPLETED | OUTPATIENT
Start: 2025-01-14 | End: 2025-01-14

## 2025-01-14 RX ADMIN — MAGNESIUM HYDROXIDE 30 ML: 400 SUSPENSION ORAL at 08:54

## 2025-01-14 RX ADMIN — SENNOSIDES 2 TABLET: 8.6 TABLET, FILM COATED ORAL at 21:18

## 2025-01-14 RX ADMIN — METHOCARBAMOL TABLETS 500 MG: 500 TABLET, COATED ORAL at 13:49

## 2025-01-14 RX ADMIN — SIMETHICONE 80 MG: 80 TABLET, CHEWABLE ORAL at 12:34

## 2025-01-14 RX ADMIN — OXYCODONE HYDROCHLORIDE 5 MG: 5 TABLET ORAL at 06:57

## 2025-01-14 RX ADMIN — BICTEGRAVIR SODIUM, EMTRICITABINE, AND TENOFOVIR ALAFENAMIDE FUMARATE 1 TABLET: 50; 200; 25 TABLET ORAL at 13:49

## 2025-01-14 RX ADMIN — OXYCODONE HYDROCHLORIDE 5 MG: 5 TABLET ORAL at 18:22

## 2025-01-14 RX ADMIN — SIMETHICONE 80 MG: 80 TABLET, CHEWABLE ORAL at 08:54

## 2025-01-14 RX ADMIN — OXYCODONE HYDROCHLORIDE 5 MG: 5 TABLET ORAL at 12:34

## 2025-01-14 RX ADMIN — IBUPROFEN 600 MG: 600 TABLET ORAL at 22:29

## 2025-01-14 RX ADMIN — INSULIN GLARGINE 24 UNITS: 100 INJECTION, SOLUTION SUBCUTANEOUS at 08:54

## 2025-01-14 RX ADMIN — LIDOCAINE 4% 1 PATCH: 40 PATCH TOPICAL at 21:19

## 2025-01-14 RX ADMIN — METHOCARBAMOL TABLETS 500 MG: 500 TABLET, COATED ORAL at 08:54

## 2025-01-14 RX ADMIN — PANTOPRAZOLE SODIUM 40 MG: 40 TABLET, DELAYED RELEASE ORAL at 08:54

## 2025-01-14 RX ADMIN — OXYCODONE HYDROCHLORIDE 5 MG: 5 TABLET ORAL at 01:06

## 2025-01-14 RX ADMIN — SENNOSIDES 2 TABLET: 8.6 TABLET, FILM COATED ORAL at 08:54

## 2025-01-14 RX ADMIN — Medication 5 MG: at 21:18

## 2025-01-14 RX ADMIN — SIMETHICONE 80 MG: 80 TABLET, CHEWABLE ORAL at 21:18

## 2025-01-14 RX ADMIN — POLYETHYLENE GLYCOL 3350 17 G: 17 POWDER, FOR SOLUTION ORAL at 08:54

## 2025-01-14 RX ADMIN — IBUPROFEN 600 MG: 600 TABLET ORAL at 16:04

## 2025-01-14 RX ADMIN — BISACODYL 10 MG: 10 SUPPOSITORY RECTAL at 06:17

## 2025-01-14 RX ADMIN — ACETAMINOPHEN 975 MG: 325 TABLET, FILM COATED ORAL at 06:16

## 2025-01-14 RX ADMIN — Medication 5 ML: at 13:49

## 2025-01-14 RX ADMIN — QUETIAPINE FUMARATE 50 MG: 50 TABLET ORAL at 21:18

## 2025-01-14 RX ADMIN — ENOXAPARIN SODIUM 40 MG: 40 INJECTION SUBCUTANEOUS at 21:19

## 2025-01-14 RX ADMIN — SIMETHICONE 80 MG: 80 TABLET, CHEWABLE ORAL at 16:04

## 2025-01-14 RX ADMIN — IBUPROFEN 600 MG: 600 TABLET ORAL at 08:54

## 2025-01-14 RX ADMIN — METHOCARBAMOL TABLETS 500 MG: 500 TABLET, COATED ORAL at 21:18

## 2025-01-14 RX ADMIN — POLYETHYLENE GLYCOL 3350 17 G: 17 POWDER, FOR SOLUTION ORAL at 21:19

## 2025-01-14 RX ADMIN — ACETAMINOPHEN 975 MG: 325 TABLET, FILM COATED ORAL at 18:22

## 2025-01-14 ASSESSMENT — ACTIVITIES OF DAILY LIVING (ADL)
ADLS_ACUITY_SCORE: 36

## 2025-01-14 NOTE — PROGRESS NOTES
CLINICAL NUTRITION SERVICES - REASSESSMENT NOTE     Nutrition Prescription    RECOMMENDATIONS FOR MDs/PROVIDERS TO ORDER:  None at present.    Malnutrition Status:    Moderate malnutrition in the context of acute illness.    Recommendations already ordered by Registered Dietitian (RD):  Discontinued room service w/ assist  Continue Ensure Max TID    Future/Additional Recommendations:  Monitor oral intake, weight, supplement preferences.     EVALUATION OF THE PROGRESS TOWARD GOALS   Diet: Regular  Ensure Max TID  Room service w/ assist - pt declining to order meals with nutrition associates  Intake: Pt consuming % of meals per flowsheet. Pt ordering 2-3 small meals a day per [employer] [facility].    Nutrition Support: On TPN 12/25-1/2     NEW FINDINGS   Met with pt. He reports that his appetite is good. He eats 2 meals a day. He reports drinking 4 Ensure Max a day. He says his wound is healed.    Weight:  Last wt (1/14): 70.1 kg  15.8% wt loss in 2 weeks (down from 83.3 kg on 12/27). Question if wt was elevated around 12/27 d/t fluid  No significant PTA wt loss    Labs:  1/13 Na 133 (L).  1/13 Cr 0.65 (L).  10/23 A1C 10.5 (H).    Medications:  Bisacodyl suppository  Novolog  Lantus  MOM  Protonix  Miralax  Senna  Simethicone    GI:  Stooling 0-4x daily per I/O. LBM today    Skin: midline abdomen surgical wound - status: evolving and deteriorating. WOC following, last assessed 1/13    Respiratory: RA    Endo: DM2    MALNUTRITION  % Intake: Decreased intake does not meet criteria  % Weight Loss: > 2% in 1 week (severe)  Subcutaneous Fat Loss: Upper arm:  mild  Muscle Loss: Thoracic region (clavicle, acromium bone, deltoid, trapezius, pectoral):  mild, Upper arm (bicep, tricep):  mild, Upper leg (quadricep, hamstring):  mild, and Posterior calf:  mild  Fluid Accumulation/Edema: Does not meet criteria  Malnutrition Diagnosis: Moderate malnutrition in the context of acute illness.    Previous Goals   Patient to consume  "% of nutritionally adequate meal trays TID, or the equivalent with supplements/snacks.  Evaluation: progressing    Previous Nutrition Diagnosis  Increased protein needs related to surgical wound healing support as evidenced by estimated protein needs of 1.5-2.0 gram/kg per 65 kg adjusted wt.     Evaluation: No change    CURRENT NUTRITION DIAGNOSIS  Increased protein needs related to surgical wound healing support as evidenced by estimated protein needs of 1.5-2.0 gram/kg per 65 kg adjusted wt.      INTERVENTIONS  Implementation  Medical food supplement therapy    Goals  Patient to consume % of nutritionally adequate meal trays TID, or the equivalent with supplements/snacks.    Monitoring/Evaluation  Progress toward goals will be monitored and evaluated per protocol.    Tristen Oneill, LOPEZ, LD  Available on [employer]  Weekend/Holiday RD Mendoza - \"Weekend Clinical Dietitian\"  No longer available by paging   "

## 2025-01-14 NOTE — PROGRESS NOTES
Addiction Team Social Work Note    Date of  Intervention: 01/14/25  Collaborated with:  ***    Referred by: ***  Reason for consult: ***  Patient information: ***    Intervention:  ***    Assessment:  {free text or support system assessment:065287}    Plan:    Anticipated Disposition:  ***    Follow Up:  ***    Due to regulation of Title 42 of the Code of Federal Regulations (CFR) Part 2: Confidentiality laws apply to this note and the information wherein.  Thus, this note cannot be copy and pasted into any other health care staff's note nor can it be included in general medical records sent to ANY outside agency without the patient's written consent.    JOMAR Tompkins  She/her/hers  Social Work Services  Inpatient Addiction Medicine Team  [phone] work cell phone  [phone] pager  8:00am-4:30pm M/T/Th/F; Off Wednesday's

## 2025-01-14 NOTE — PROGRESS NOTES
[facility]   Consult Note - Addiction Service     Date of Admission:  12/20/2024    Consult Requested by: Surgery  Reason for Consult: Help with disposition    Assessment & Plan   The patient has a PMHx that includes with HIV/AIDS, recurrent SBO, DM2.    #Methamphetamine Use Disorder  Intermittent over the last 10 years. Last use was 2 months ago. Previously noted motivation to quit but spent more of the conversation about therapy. Harm reduction stragities were discussed as well as latest treatment options include injectable naltrexone plus oral bupropion. Currently he is taking opioids and thus naltrexone is not an option. Otherwise previously he has been on bupropion which could be resume if he would want.  -If open to it, consider restarting buproprion  mg daily     # Harm Reduction:  # Immunization review:   Due to his history of IV use, consider checking hep C pcr reflex,  History of positive  Hep A IgG but consider Hep B serologies     # Peer Support:   -Our peer  will meet the patient if agreeable and still hospitalized on Thursday, to provide additional outpatient resources  -To contact Malou Peer  from [facility] ([facility]): call or text: [phone]    # Addiction Social Worker:   Difficult disposition in the setting of placement, but will discuss with Addition team  Silas.  --If he becomes interested in inpatient addiction treatment after discharge.  Please consult Chem Dependency within 1 week of discharge to begin this evaluation.  --If he is interested in ONLY outpatient addiction treatment after discharge.  Our addiction social worker will give the patient appropriate resources for outpatient evaluation.    # Linkage to Care:   Will continue to approach about outpatient care from a substance use standpoint.      Care discussed with Dr. Abdi.    Jenelle Hill MD  IM PGY3    ChAT team (Addiction Consult  Team): Coverage daily 8-4pm   ____________________________________________________________________    S:  - Continues to have abdominal pain  - Wonders when he will be able to be discharged  - Discussed prior therapy  - Was helpful with history of trauma as a young child  - Doesn't feel that trauma is currently effecting him, but it is in his subconsciousness      Review of Systems   The 10 point Review of Systems is negative other than noted in the HPI or here.     Physical Exam   Temp: 97  F (36.1  C) Temp src: Axillary BP: 113/74 Pulse: 93   Resp: 18 SpO2: 98 % O2 Device: None (Room air)      Gen: No acute distress, well nourished, well developed  HEENT: NC/AT, MMM  CV: Extremities WWP  Resp: Breathing comfortably on RA  Abd: ABD pad over the central area of his abdomen  Ext: No significant deformities or trauma, moving all ext freely  Neuro: No focal neurologic deficit  Psych: Open to discussion, resting, flat    Due to regulation of Title 42 of the Code of Federal Regulations (CFR) Part 2: Confidentiality laws apply to this note and the information wherein.  Thus, this note cannot be copy and pasted into any other health care staff's note nor can it be included in general medical records sent to ANY outside agency without the patient's written consent.

## 2025-01-14 NOTE — PLAN OF CARE
Goal Outcome Evaluation:           Overall Patient Progress: no changeOverall Patient Progress: no change    Outcome Evaluation: Patient alert and oriented.  States pain of 9 and 10 in abdomen.  scheduled tylenol given, prn ibuprofen and oxycodone givne with some relief.    A:  surgeons here this morning and dressing change to abdomen completed by them.  Wound pink.  MD encouraged dulcolax suppository given early today.  Had large BM.  Declines to order breakfast at this time. Munching on esau crackers and sipping diet marietta.    R:  continue to monitor and treat per plan of care.

## 2025-01-15 LAB
ANION GAP SERPL CALCULATED.3IONS-SCNC: 10 MMOL/L (ref 7–15)
BUN SERPL-MCNC: 20 MG/DL (ref 8–23)
CALCIUM SERPL-MCNC: 8.6 MG/DL (ref 8.8–10.4)
CHLORIDE SERPL-SCNC: 101 MMOL/L (ref 98–107)
CREAT SERPL-MCNC: 0.7 MG/DL (ref 0.67–1.17)
EGFRCR SERPLBLD CKD-EPI 2021: >90 ML/MIN/1.73M2
GLUCOSE BLDC GLUCOMTR-MCNC: 133 MG/DL (ref 70–99)
GLUCOSE BLDC GLUCOMTR-MCNC: 135 MG/DL (ref 70–99)
GLUCOSE BLDC GLUCOMTR-MCNC: 177 MG/DL (ref 70–99)
GLUCOSE BLDC GLUCOMTR-MCNC: 179 MG/DL (ref 70–99)
GLUCOSE BLDC GLUCOMTR-MCNC: 212 MG/DL (ref 70–99)
GLUCOSE SERPL-MCNC: 145 MG/DL (ref 70–99)
HCO3 SERPL-SCNC: 24 MMOL/L (ref 22–29)
HOLD SPECIMEN: NORMAL
MAGNESIUM SERPL-MCNC: 1.8 MG/DL (ref 1.7–2.3)
PHOSPHATE SERPL-MCNC: 4.3 MG/DL (ref 2.5–4.5)
POTASSIUM SERPL-SCNC: 3.9 MMOL/L (ref 3.4–5.3)
SODIUM SERPL-SCNC: 135 MMOL/L (ref 135–145)

## 2025-01-15 PROCEDURE — 250N000011 HC RX IP 250 OP 636

## 2025-01-15 PROCEDURE — 250N000013 HC RX MED GY IP 250 OP 250 PS 637: Performed by: STUDENT IN AN ORGANIZED HEALTH CARE EDUCATION/TRAINING PROGRAM

## 2025-01-15 PROCEDURE — 250N000013 HC RX MED GY IP 250 OP 250 PS 637: Performed by: NURSE PRACTITIONER

## 2025-01-15 PROCEDURE — 120N000002 HC R&B MED SURG/OB UMMC

## 2025-01-15 PROCEDURE — 82565 ASSAY OF CREATININE: CPT | Performed by: SURGERY

## 2025-01-15 PROCEDURE — 83735 ASSAY OF MAGNESIUM: CPT | Performed by: SURGERY

## 2025-01-15 PROCEDURE — 250N000011 HC RX IP 250 OP 636: Performed by: STUDENT IN AN ORGANIZED HEALTH CARE EDUCATION/TRAINING PROGRAM

## 2025-01-15 PROCEDURE — 80048 BASIC METABOLIC PNL TOTAL CA: CPT | Performed by: SURGERY

## 2025-01-15 PROCEDURE — 250N000013 HC RX MED GY IP 250 OP 250 PS 637

## 2025-01-15 PROCEDURE — 999N000044 HC STATISTIC CVC DRESSING CHANGE

## 2025-01-15 PROCEDURE — 84100 ASSAY OF PHOSPHORUS: CPT | Performed by: SURGERY

## 2025-01-15 PROCEDURE — 250N000013 HC RX MED GY IP 250 OP 250 PS 637: Performed by: SURGERY

## 2025-01-15 PROCEDURE — 36592 COLLECT BLOOD FROM PICC: CPT | Performed by: SURGERY

## 2025-01-15 RX ADMIN — POLYETHYLENE GLYCOL 3350 17 G: 17 POWDER, FOR SOLUTION ORAL at 09:13

## 2025-01-15 RX ADMIN — LIDOCAINE 4% 1 PATCH: 40 PATCH TOPICAL at 20:13

## 2025-01-15 RX ADMIN — MAGNESIUM HYDROXIDE 30 ML: 400 SUSPENSION ORAL at 20:13

## 2025-01-15 RX ADMIN — QUETIAPINE FUMARATE 50 MG: 50 TABLET ORAL at 22:28

## 2025-01-15 RX ADMIN — BISACODYL 10 MG: 10 SUPPOSITORY RECTAL at 09:13

## 2025-01-15 RX ADMIN — SIMETHICONE 80 MG: 80 TABLET, CHEWABLE ORAL at 09:13

## 2025-01-15 RX ADMIN — ACETAMINOPHEN 975 MG: 325 TABLET, FILM COATED ORAL at 22:28

## 2025-01-15 RX ADMIN — PANTOPRAZOLE SODIUM 40 MG: 40 TABLET, DELAYED RELEASE ORAL at 09:13

## 2025-01-15 RX ADMIN — Medication 5 MG: at 17:25

## 2025-01-15 RX ADMIN — IBUPROFEN 600 MG: 600 TABLET ORAL at 17:25

## 2025-01-15 RX ADMIN — SIMETHICONE 80 MG: 80 TABLET, CHEWABLE ORAL at 17:25

## 2025-01-15 RX ADMIN — OXYCODONE HYDROCHLORIDE 5 MG: 5 TABLET ORAL at 23:46

## 2025-01-15 RX ADMIN — ACETAMINOPHEN 975 MG: 325 TABLET, FILM COATED ORAL at 14:42

## 2025-01-15 RX ADMIN — SENNOSIDES 2 TABLET: 8.6 TABLET, FILM COATED ORAL at 09:13

## 2025-01-15 RX ADMIN — OXYCODONE HYDROCHLORIDE 5 MG: 5 TABLET ORAL at 09:13

## 2025-01-15 RX ADMIN — INSULIN GLARGINE 24 UNITS: 100 INJECTION, SOLUTION SUBCUTANEOUS at 09:13

## 2025-01-15 RX ADMIN — IBUPROFEN 600 MG: 600 TABLET ORAL at 11:38

## 2025-01-15 RX ADMIN — SIMETHICONE 80 MG: 80 TABLET, CHEWABLE ORAL at 20:13

## 2025-01-15 RX ADMIN — OXYCODONE HYDROCHLORIDE 5 MG: 5 TABLET ORAL at 17:25

## 2025-01-15 RX ADMIN — Medication 15 ML: at 11:37

## 2025-01-15 RX ADMIN — METHOCARBAMOL TABLETS 500 MG: 500 TABLET, COATED ORAL at 20:13

## 2025-01-15 RX ADMIN — MAGNESIUM HYDROXIDE 30 ML: 400 SUSPENSION ORAL at 09:13

## 2025-01-15 RX ADMIN — SENNOSIDES 2 TABLET: 8.6 TABLET, FILM COATED ORAL at 20:13

## 2025-01-15 RX ADMIN — ENOXAPARIN SODIUM 40 MG: 40 INJECTION SUBCUTANEOUS at 20:13

## 2025-01-15 RX ADMIN — SIMETHICONE 80 MG: 80 TABLET, CHEWABLE ORAL at 11:38

## 2025-01-15 RX ADMIN — POLYETHYLENE GLYCOL 3350 17 G: 17 POWDER, FOR SOLUTION ORAL at 20:13

## 2025-01-15 RX ADMIN — METHOCARBAMOL TABLETS 500 MG: 500 TABLET, COATED ORAL at 09:13

## 2025-01-15 RX ADMIN — BICTEGRAVIR SODIUM, EMTRICITABINE, AND TENOFOVIR ALAFENAMIDE FUMARATE 1 TABLET: 50; 200; 25 TABLET ORAL at 14:42

## 2025-01-15 RX ADMIN — ACETAMINOPHEN 975 MG: 325 TABLET, FILM COATED ORAL at 09:13

## 2025-01-15 RX ADMIN — METHOCARBAMOL TABLETS 500 MG: 500 TABLET, COATED ORAL at 14:42

## 2025-01-15 ASSESSMENT — ACTIVITIES OF DAILY LIVING (ADL)
ADLS_ACUITY_SCORE: 36
ADLS_ACUITY_SCORE: 34
ADLS_ACUITY_SCORE: 36
ADLS_ACUITY_SCORE: 36
ADLS_ACUITY_SCORE: 34
ADLS_ACUITY_SCORE: 36

## 2025-01-15 NOTE — PLAN OF CARE
Goal Outcome Evaluation:      Plan of Care Reviewed With: patient          Outcome Evaluation: alert and orientated. passing lots of gas bm this am. up ambulating halls with staff.  looking for tcu  cont plan of care

## 2025-01-15 NOTE — PROGRESS NOTES
Care Management Follow Up    Length of Stay (days): 26  Expected Discharge Date: 01/17/2025  Concerns to be Addressed: discharge planning     Patient plan of care discussed at interdisciplinary rounds: Yes  Anticipated Discharge Disposition: Inpatient Chemical Dependency, Medical Respite, Shelter  Anticipated Discharge Services: None  Anticipated Discharge DME: None  Patient/family educated on Medicare website which has current facility and service quality ratings: yes  Education Provided on the Discharge Plan: Yes  Patient/Family in Agreement with the Plan: yes  Referrals Placed by CM/SW: [facility]  Private pay costs discussed: Not applicable  Discussed  Partnership in Safe Discharge Planning  document with patient/family: Yes   Handoff Completed: No, handoff not indicated or clinically appropriate  Additional Information: Per Surgery Team, patient is medically ready for discharge. Per PT, dispo recs have changed as pt now moving SBA and considered to be safe to discharge to a CD program or shelter with a walker. Pt has Midline wound cares - BID wet to dry. Patient was previously at [facility] for CD treatment and would need to be completely independent (including wound cares) to return to this treatment program. Patient is unhoused at baseline and has limited supports and no [facility] program or worker involvement. Pt was SWALAN escalated on 1/8 for disposition assistance.      Writer received contact from Addiction Medicine SW Silas, who conferred that until the pt is independent with wound care, he would not be able to go to medical respite, CD treatment, or shelter. Once these barriers are removed, could explore further. Silas recommended reaching out to [facility] (Ph: [phone]; F: [phone]) as might consider the pt. Writer called [facility] with no PHI, but with pt's general needs. The  indicated their facility will get back to this writer within 24 hours. Should  "they indicate they are able to meet the pt's, the pt would need to complete a MAGGY in order for care management to send the referral. Writer requested an updated Comprehensive Assessment through the Chem Dep team to facilitate CD treatment placement.    Post hospitalization/ Establish PCP appt scheduled for 1/30/2025 at 11:30 with Dr. Nico Hernandez at [facility].    Writer encountered the pt in the hallway as he was walking barefoot doing laps with his walker. Writer identified self and discussed pt's discharge barriers and disposition options. Writer explored medical respite, regular shelter, and CD treatment. Pt reported he is going back to [facility]-writer advised he would need to be independent with his wound cares. Pt reported he doesn't want to work with this writer, swore at this writer, and said \"bye\".    Writer notes that Chem Dep team saw the pt today and the pt declined a comprehensive assessment.    In order to refer the pt to [facility], he would need to be in agreement with this placement, following the rules, and meeting with the  and nurse weekly.    Next Steps: Care management team will continue to follow. Anticipate pt will discharge to medical respite vs shelter vs CD treatment.     RADHA Kamara, JOMAR  Clinical , [facility]-  Desk Phone: [phone]   Schedule: Wednesday, Thursday, Fridays (for Mondays & Tuesdays, contact job share partner Sharon Nguyen)  Securely message with [employer] (Search Name or 7C Med Surg [phone] SW)  Text page via [employer] Paging/Directory   See signed in provider for up to date coverage information  Social Work & Care Management Department    Weekend And After Hours Care Management Contacts:  After Hours Social Work Coverage [phone] daily: Searchable in Vocera \"Adult SW After Hours On Call\"   Weekend Coverage [phone]: Searchable in Vocera \"7C Med Surg SW\" or \"7C Med Surg RNCC\"  "

## 2025-01-15 NOTE — PROGRESS NOTES
Surgery Progress Note  01/15/2025       Subjective:  - Continues to have abdominal pain   - Denies N/V     Objective:  Temp:  [98.2  F (36.8  C)-98.5  F (36.9  C)] 98.5  F (36.9  C)  Pulse:  [87-95] 87  Resp:  [16-18] 16  BP: (114-128)/(66-78) 114/66  SpO2:  [97 %-98 %] 97 %    I/O last 3 completed shifts:  In: 840 [P.O.:840]  Out: 400 [Urine:400]      Gen: Awake, alert, NAD  Resp: NLB on RA  Abd: Soft, appropriately tender, mildly distended, midline incision dressing removed and changed  Incision: No purulent drainage, non-erythematous, with fibrinous tissue   Ext: WWP, no edema     Labs:  Recent Labs   Lab 01/14/25  0604 01/13/25  2351 01/13/25  1332 01/13/25  1235   WBC 10.9  --  11.3* 13.0*   HGB 7.7*  --  7.8* 5.2*   * 664* 714* 786*       Recent Labs   Lab 01/15/25  0550 01/15/25  0157 01/14/25  2104 01/14/25  0759 01/14/25  0604 01/13/25  1736 01/13/25  1235 01/12/25  1029 01/12/25  0609     --   --   --   --   --  133*  --  133*   POTASSIUM 3.9  --   --   --  4.1  --  3.7  --  3.8   CHLORIDE 101  --   --   --   --   --  101  --  101   CO2 24  --   --   --   --   --  22  --  23   BUN 20.0  --   --   --   --   --  18.2  --  18.2   CR 0.70  --   --   --   --   --  0.65*  --  0.60*   * 135* 156*   < >  --    < > 132*   < > 196*   LINDA 8.6*  --   --   --   --   --  8.3*  --  8.3*   MAG 1.8  --   --   --  2.1  --  1.7  --  1.7   PHOS 4.3  --   --   --  4.2  --  3.8  --  3.2    < > = values in this interval not displayed.     Imaging:  No new imaging in the last 24 hours.      Assessment/Plan:   59 year old male with PMHx T2DM, HIV on ART, and recent admission from 12/17-12/19 for SBO managed successfully with bowel rest/GGC who presented to ED again on 12/20 for recurrent SBO s/p ex lap/SBR on 12/20. Now s/p ex-lap/SBR/re-anastomosis for anastomotic leak on 12/24. Extubated 12/26 AM. Now with anterograde bowel function and improved oral intake     POD# 26: Medically ready for discharge. Continue  discharge planning. Addiction medicine SW to help with placement.     - Scheduled suppository and soap suds enema.  - Continue multimodal pain control   - Moderate Protein-Calorie Malnutrition: Continue regular diet and Boost supplements  - Continue Biktarvy  - Wet to dry dressing changes BID  - Replete electrolytes PRN  - Bowel Regimen: Miralax BID, Senna BID, Milk of Mag BID, Dulcolax suppository PRN  - Endocrinology: Appreciate IP management and discharge recommendations  - Ambulate QID: Goal to be off 1:1 assist  - WOC consult  - Ongoing discussions: WOC VAC vs dressing changes vs Medical respit for discharge.  - Dispo: TCU referrals sent out, still awaiting placement. Appreciate SW help. Continue to follow-up with SWAT regarding Wet house vs Group home options.      Seen, examined, and discussed with chief resident, who will discuss with staff.  - - - - - - - - - - - - - - - - - -  Kait Pineda DO   [facility] PGY1

## 2025-01-15 NOTE — CONSULTS
1/15/2025  I spoke with pt today. He declined needing a TOMMY CA today. He stated he will be returning to [facility] upon discharge from the hospital. This TOMMY Consult can be re-ordered if he changes his mind and needs a TOMMY CA.    Carrie Conroy MA [facility]  TOMMY Evaluation Counselor  [phone]  [email or web address]

## 2025-01-15 NOTE — PROGRESS NOTES
[facility]   Consult Note - Addiction Service     Date of Admission:  12/20/2024    Consult Requested by: Surgery  Reason for Consult: Help with disposition    Assessment & Plan   The patient has a PMHx that includes with HIV/AIDS, recurrent SBO, DM2.    #Methamphetamine Use Disorder  Intermittent over the last 10 years. Reports spending periods of time away from use, but diving factor for use is feeling bored. Previously noted motivation to quit and is hopeful he can avoid use.  Continue to discuss options for treatment including bupropion, but he is not currently wanting to start the medicine.   -If open to it in the future, consider restarting buproprion  mg daily     # Harm Reduction:  # Immunization review:   Due to his history of IV use, consider checking hep C pcr reflex,  History of positive  Hep A IgG but consider Hep B serologies     # Peer Support:   -Our peer  will meet the patient if agreeable and still hospitalized on Thursday, to provide additional outpatient resources  -To contact Malou Peer  from [facility] ([facility]): call or text: [phone]    # Addiction Social Worker:   Difficult disposition in the setting of placement, but will discuss with Addition team  Silas.  --If he becomes interested in inpatient addiction treatment after discharge.  Please consult Chem Dependency within 1 week of discharge to begin this evaluation.  --If he is interested in ONLY outpatient addiction treatment after discharge.  Our addiction social worker will give the patient appropriate resources for outpatient evaluation.    # Linkage to Care:   Will continue to approach about outpatient care from a substance use standpoint.      Care discussed with Dr. Abdi.    Jenelle Hill MD  IM PGY3    ChAT team (Addiction Consult Team): Coverage daily 8-4pm    ____________________________________________________________________    S:  - Resting in bed  - Hopeful to avoid drug use going forward reporting use is what causes a lot of his health issues  - Would go through periods of sobriety, before using again  - Feels that he is too old to continue to use drugs    Review of Systems   The 10 point Review of Systems is negative other than noted in the HPI or here.     Physical Exam   Temp: 98.5  F (36.9  C) Temp src: Oral BP: 114/66 Pulse: 87   Resp: 16 SpO2: 97 % O2 Device: None (Room air)      Gen: No acute distress, well nourished, well developed  HEENT: NC/AT, MMM  CV: Extremities WWP  Resp: Breathing comfortably on RA  Abd: ABD pad over the central area of his abdomen  Ext: Moving all extremities freely  Psych: Open to discussion, resting, flat    Due to regulation of Title 42 of the Code of Federal Regulations (CFR) Part 2: Confidentiality laws apply to this note and the information wherein.  Thus, this note cannot be copy and pasted into any other health care staff's note nor can it be included in general medical records sent to ANY outside agency without the patient's written consent.

## 2025-01-15 NOTE — PLAN OF CARE
Goal Outcome Evaluation:           Overall Patient Progress: no changeOverall Patient Progress: no change    Outcome Evaluation: Patient refused MOM at HS.  Did take miralax.  Continues to have abdominal pain.Ibuprofen given with relief.  Could have oxycodone at midnight but patient sleeping.    A:  Patient requested to have shower before bed.  Assisted to shower and got dressing wet even though covered with plastic.  New dressing placed.  No complaints of pain with dressing change but did complain of pain of 10 1 hour after.    R: continue to monitor and treat per plan of care.

## 2025-01-16 VITALS
BODY MASS INDEX: 25.07 KG/M2 | DIASTOLIC BLOOD PRESSURE: 79 MMHG | WEIGHT: 146.83 LBS | HEART RATE: 96 BPM | RESPIRATION RATE: 20 BRPM | OXYGEN SATURATION: 97 % | TEMPERATURE: 98.1 F | SYSTOLIC BLOOD PRESSURE: 127 MMHG | HEIGHT: 64 IN

## 2025-01-16 LAB
GLUCOSE BLDC GLUCOMTR-MCNC: 118 MG/DL (ref 70–99)
GLUCOSE BLDC GLUCOMTR-MCNC: 146 MG/DL (ref 70–99)
GLUCOSE BLDC GLUCOMTR-MCNC: 150 MG/DL (ref 70–99)
GLUCOSE BLDC GLUCOMTR-MCNC: 164 MG/DL (ref 70–99)
GLUCOSE BLDC GLUCOMTR-MCNC: 230 MG/DL (ref 70–99)
HOLD SPECIMEN: NORMAL
PHOSPHATE SERPL-MCNC: 3.1 MG/DL (ref 2.5–4.5)
POTASSIUM SERPL-SCNC: 4 MMOL/L (ref 3.4–5.3)

## 2025-01-16 PROCEDURE — 36592 COLLECT BLOOD FROM PICC: CPT | Performed by: SURGERY

## 2025-01-16 PROCEDURE — 250N000013 HC RX MED GY IP 250 OP 250 PS 637: Performed by: SURGERY

## 2025-01-16 PROCEDURE — 250N000013 HC RX MED GY IP 250 OP 250 PS 637: Performed by: STUDENT IN AN ORGANIZED HEALTH CARE EDUCATION/TRAINING PROGRAM

## 2025-01-16 PROCEDURE — 120N000002 HC R&B MED SURG/OB UMMC

## 2025-01-16 PROCEDURE — 250N000011 HC RX IP 250 OP 636

## 2025-01-16 PROCEDURE — 84100 ASSAY OF PHOSPHORUS: CPT | Performed by: SURGERY

## 2025-01-16 PROCEDURE — 250N000011 HC RX IP 250 OP 636: Performed by: STUDENT IN AN ORGANIZED HEALTH CARE EDUCATION/TRAINING PROGRAM

## 2025-01-16 PROCEDURE — 250N000013 HC RX MED GY IP 250 OP 250 PS 637

## 2025-01-16 PROCEDURE — 250N000013 HC RX MED GY IP 250 OP 250 PS 637: Performed by: NURSE PRACTITIONER

## 2025-01-16 PROCEDURE — 84132 ASSAY OF SERUM POTASSIUM: CPT | Performed by: SURGERY

## 2025-01-16 RX ORDER — METHOCARBAMOL 750 MG/1
750 TABLET, FILM COATED ORAL 3 TIMES DAILY
Status: DISCONTINUED | OUTPATIENT
Start: 2025-01-16 | End: 2025-01-20 | Stop reason: HOSPADM

## 2025-01-16 RX ADMIN — ENOXAPARIN SODIUM 40 MG: 40 INJECTION SUBCUTANEOUS at 20:03

## 2025-01-16 RX ADMIN — OXYCODONE HYDROCHLORIDE 5 MG: 5 TABLET ORAL at 08:12

## 2025-01-16 RX ADMIN — METHOCARBAMOL TABLETS 500 MG: 500 TABLET, COATED ORAL at 08:07

## 2025-01-16 RX ADMIN — POLYETHYLENE GLYCOL 3350 17 G: 17 POWDER, FOR SOLUTION ORAL at 20:03

## 2025-01-16 RX ADMIN — POLYETHYLENE GLYCOL 3350 17 G: 17 POWDER, FOR SOLUTION ORAL at 08:08

## 2025-01-16 RX ADMIN — INSULIN GLARGINE 24 UNITS: 100 INJECTION, SOLUTION SUBCUTANEOUS at 09:06

## 2025-01-16 RX ADMIN — IBUPROFEN 600 MG: 600 TABLET ORAL at 18:23

## 2025-01-16 RX ADMIN — ACETAMINOPHEN 975 MG: 325 TABLET, FILM COATED ORAL at 21:46

## 2025-01-16 RX ADMIN — QUETIAPINE FUMARATE 50 MG: 50 TABLET ORAL at 21:46

## 2025-01-16 RX ADMIN — ACETAMINOPHEN 975 MG: 325 TABLET, FILM COATED ORAL at 14:58

## 2025-01-16 RX ADMIN — SIMETHICONE 80 MG: 80 TABLET, CHEWABLE ORAL at 17:02

## 2025-01-16 RX ADMIN — MAGNESIUM HYDROXIDE 30 ML: 400 SUSPENSION ORAL at 08:08

## 2025-01-16 RX ADMIN — SENNOSIDES 2 TABLET: 8.6 TABLET, FILM COATED ORAL at 20:03

## 2025-01-16 RX ADMIN — BICTEGRAVIR SODIUM, EMTRICITABINE, AND TENOFOVIR ALAFENAMIDE FUMARATE 1 TABLET: 50; 200; 25 TABLET ORAL at 15:02

## 2025-01-16 RX ADMIN — SIMETHICONE 80 MG: 80 TABLET, CHEWABLE ORAL at 20:03

## 2025-01-16 RX ADMIN — BISACODYL 10 MG: 10 SUPPOSITORY RECTAL at 08:09

## 2025-01-16 RX ADMIN — OXYCODONE HYDROCHLORIDE 5 MG: 5 TABLET ORAL at 17:02

## 2025-01-16 RX ADMIN — LIDOCAINE 4% 1 PATCH: 40 PATCH TOPICAL at 20:03

## 2025-01-16 RX ADMIN — OXYCODONE HYDROCHLORIDE 5 MG: 5 TABLET ORAL at 23:00

## 2025-01-16 RX ADMIN — ACETAMINOPHEN 975 MG: 325 TABLET, FILM COATED ORAL at 06:58

## 2025-01-16 RX ADMIN — MAGNESIUM HYDROXIDE 30 ML: 400 SUSPENSION ORAL at 20:03

## 2025-01-16 RX ADMIN — METHOCARBAMOL 750 MG: 750 TABLET ORAL at 20:02

## 2025-01-16 RX ADMIN — Medication 5 ML: at 17:04

## 2025-01-16 RX ADMIN — SENNOSIDES 2 TABLET: 8.6 TABLET, FILM COATED ORAL at 08:07

## 2025-01-16 RX ADMIN — SIMETHICONE 80 MG: 80 TABLET, CHEWABLE ORAL at 08:07

## 2025-01-16 RX ADMIN — IBUPROFEN 600 MG: 600 TABLET ORAL at 09:06

## 2025-01-16 RX ADMIN — PANTOPRAZOLE SODIUM 40 MG: 40 TABLET, DELAYED RELEASE ORAL at 08:07

## 2025-01-16 RX ADMIN — SIMETHICONE 80 MG: 80 TABLET, CHEWABLE ORAL at 14:58

## 2025-01-16 RX ADMIN — METHOCARBAMOL TABLETS 500 MG: 500 TABLET, COATED ORAL at 14:58

## 2025-01-16 ASSESSMENT — ACTIVITIES OF DAILY LIVING (ADL)
ADLS_ACUITY_SCORE: 36
ADLS_ACUITY_SCORE: 36
ADLS_ACUITY_SCORE: 34
ADLS_ACUITY_SCORE: 34
ADLS_ACUITY_SCORE: 36
ADLS_ACUITY_SCORE: 36
ADLS_ACUITY_SCORE: 34
ADLS_ACUITY_SCORE: 34
ADLS_ACUITY_SCORE: 36
ADLS_ACUITY_SCORE: 34
ADLS_ACUITY_SCORE: 36
ADLS_ACUITY_SCORE: 34
ADLS_ACUITY_SCORE: 36
ADLS_ACUITY_SCORE: 34
ADLS_ACUITY_SCORE: 36
ADLS_ACUITY_SCORE: 36

## 2025-01-16 NOTE — PLAN OF CARE
Goal Outcome Evaluation:      Plan of Care Reviewed With: patient    Overall Patient Progress: improvingOverall Patient Progress: improving    Outcome Evaluation: patient had two bm today. passing lots of gas. patient is not helping with the wound cares he is unable to the new wound orders. patient is steady on his feet. ambulated x3 in halls. patient restless.

## 2025-01-16 NOTE — PLAN OF CARE
Goal Outcome Evaluation:      Plan of Care Reviewed With: patient    Overall Patient Progress: improvingOverall Patient Progress: improving    Outcome Evaluation: Patient passing large amount of gas but no stool so far tonight.  ABD loose and retaped.    A:  patient states pain in abdomen of 10.  Scheduled pain meds given and oxycodone given times one.  Patient sleeping.    R:  continue to monitor and treat per plan of care.

## 2025-01-16 NOTE — PROGRESS NOTES
Care Management Follow Up    Length of Stay (days): 27  Expected Discharge Date: 01/17/2025  Concerns to be Addressed: discharge planning     Patient plan of care discussed at interdisciplinary rounds: Yes  Anticipated Discharge Disposition: Inpatient Chemical Dependency, Shelter  Anticipated Discharge Services: None  Anticipated Discharge DME: None  Patient/family educated on Medicare website which has current facility and service quality ratings: yes  Education Provided on the Discharge Plan: Yes  Patient/Family in Agreement with the Plan: yes  Referrals Placed by CM/SW: Community [facility]  Private pay costs discussed: Not applicable  Discussed  Partnership in Safe Discharge Planning  document with patient/family: Yes  Handoff Completed: No, handoff not indicated or clinically appropriate  Additional Information: Per General Surgery Provider, this pt remains medically ready for discharge. PTA pt was at [facility] Inpatient CD Treatment. During hospitalization, pt's acuity has reduced including removal of his wound vac, improvement in his mobility so he is SBA with a walker, and discontinuing his IVABx.  with main discharge barrier being BID wet to dry dressing changes that the pt has not been able/willing to learn how to do thus far. The following is a list of the pt's limited options, with barriers:    [facility] (Ph: [phone]; F: [phone]) -Facility did request a referral and they would be willing to consider with current wounds.   -Pt would need an updated CD assessment (pt refused updated CD assessment on 1/15)  -Pt would need to sign an MAGGY in order to place referral.     [facility] (Ph: [phone] ext. [phone]; F: [phone])  [address] [place] [id number]  -Writer notes that in order to discharge to medical respite shelter, he would need to be  willing to learn how to manage his wound cares. He would need to agree to meet with  "the on-site nurse and  weekly as well. He told this writer on 1/15 that he refuses to go to [facility] because it is \"dirty\" there.    [facility] (Ph: [phone])  -Would need to be independent with wound cares.    [facility] (Ph: [phone] ; F: [phone])  [address], [place] [address]  -[facility] TOMMY [facility]  -Pt's preference for post-hospital placement  -Would need signed MAGGY in order to place referral    Next Steps: Interdisciplinary team to continue to encourage the pt to participate in and learn how to manage own wound care to promote safe discharge plan.    RADHA Kamara, [facility]  Clinical , [facility]-7C  Desk Phone: [phone]   Schedule: Wednesday, Thursday, Fridays (for Mondays & Tuesdays, contact job share partner Sharon Nguyen)  Securely message with [employer] (Search Name or 7C Med Surg [phone] SW)  Text page via [facility] Paging/Directory   See signed in provider for up to date coverage information  Social Work & Care Management Department    Weekend And After Hours Care Management Contacts:  After Hours Social Work Coverage [phone] daily: Searchable in Vocera \"Adult SW After Hours On Call\"   Weekend Coverage [phone]: Searchable in Voc[employer] \"7C Med Surg SW\" or \"7C Med Surg RNCC\"    "

## 2025-01-16 NOTE — PLAN OF CARE
Goal Outcome Evaluation:      Plan of Care Reviewed With: patient    Overall Patient Progress: no changeOverall Patient Progress: no change     Shift: 5665-2236    Pt emotionally labile throughout shift when communicating with staff. Oxycodone and ibuprofen given at two separate times after pt complaint of 10/10 pain, per pt pain not relieved at all, paged team at approx 1850 with no response at time of writing. Pt did not order dinner despite encouragement, ate one esau cracker, so did not give pre-meal or carb covering insulin. Pt dozed, ambulated in room, and watched TV most of shift.At approx 1850 pt's abd dressing fell off, pt called RN in and RN replaced abd pad after ensuring packing was still in place, reinforced w extra tape.

## 2025-01-16 NOTE — PROGRESS NOTES
Surgery Progress Note  01/16/2025       Subjective:  - Continues to have abdominal pain with dressing changes  - Denies N/V  - Continues to have constipation     Objective:  Temp:  [97.5  F (36.4  C)-98.3  F (36.8  C)] 98.3  F (36.8  C)  Pulse:  [91-96] 96  Resp:  [18] 18  BP: (119-129)/(66-83) 121/66  SpO2:  [97 %-98 %] 97 %    I/O last 3 completed shifts:  In: 360 [P.O.:360]  Out: 675 [Urine:675]      Gen: Awake, alert, NAD  Resp: NLB on RA  Abd: Soft, appropriately tender, mildly distended, midline incision dressing removed and changed  Incision: No purulent drainage, non-erythematous, with fibrinous tissue   Ext: WWP, no edema      Labs:  Recent Labs   Lab 01/14/25  0604 01/13/25  2351 01/13/25  1332 01/13/25  1235   WBC 10.9  --  11.3* 13.0*   HGB 7.7*  --  7.8* 5.2*   * 664* 714* 786*       Recent Labs   Lab 01/16/25  0747 01/16/25  0553 01/16/25  0151 01/15/25  2232 01/15/25  0921 01/15/25  0550 01/14/25  0759 01/14/25  0604 01/13/25  1736 01/13/25  1235 01/12/25  1029 01/12/25  0609   NA  --   --   --   --   --  135  --   --   --  133*  --  133*   POTASSIUM  --  4.0  --   --   --  3.9  --  4.1  --  3.7  --  3.8   CHLORIDE  --   --   --   --   --  101  --   --   --  101  --  101   CO2  --   --   --   --   --  24  --   --   --  22  --  23   BUN  --   --   --   --   --  20.0  --   --   --  18.2  --  18.2   CR  --   --   --   --   --  0.70  --   --   --  0.65*  --  0.60*   *  --  230* 179*   < > 145*   < >  --    < > 132*   < > 196*   LNIDA  --   --   --   --   --  8.6*  --   --   --  8.3*  --  8.3*   MAG  --   --   --   --   --  1.8  --  2.1  --  1.7  --  1.7   PHOS  --  3.1  --   --   --  4.3  --  4.2  --  3.8  --  3.2    < > = values in this interval not displayed.     Imaging:  No new imaging in the last 24 hours.      Assessment/Plan:   59 year old male with PMHx T2DM, HIV on ART, and recent admission from 12/17-12/19 for SBO managed successfully with bowel rest/GGC who presented to ED again on  "12/20 for recurrent SBO s/p ex lap/SBR on 12/20. Now s/p ex-lap/SBR/re-anastomosis for anastomotic leak on 12/24. Extubated 12/26 AM. Now with anterograde bowel function and improved oral intake.     POD# 27: Medically ready for discharge. Continue discharge planning. Addiction medicine SW to help with placement.     - Scheduled suppository and soap suds enema.  - Continue multimodal pain control   - Moderate Protein-Calorie Malnutrition: Continue regular diet and Boost supplements  - Continue Biktarvy  - Wet to dry dressing changes BID  - Replete electrolytes PRN  - Bowel Regimen: Miralax BID, Senna BID, Milk of Mag BID, Dulcolax suppository PRN  - Endocrinology: Appreciate IP management and discharge recommendations  - Ambulate QID: Goal to be off 1:1 assist  - WOC consult  - Ongoing discussions: WOC VAC vs dressing changes vs Medical respit for discharge.  - Dispo: TCU referrals sent out, still awaiting placement. Appreciate SW help. Continue to follow-up with SWAT regarding Wet house vs Group home options.      Seen, examined, and discussed with chief resident, who will discuss with staff.  - - - - - - - - - - - - - - - - - -  Viv Angel, MS3  [employer]    I was present with the medical/KASSY student who participated in the service and in the documentation of the note.  I have verified the history and personally performed the physical exam and medical decision making.  I agree with the assessment and plan of care as documented in the note.       Abe Hansen DO, MS   General Surgery, PGY 1      General surgery is primary. Do not look for \"signed in\" providers, please search \"General Surgery [employer]\" in [employer] and use listings as detailed below:     Daytime  First call: \"General surgery intern inpatients\"  Second call: \"General surgery resident consult\"  Third call: \"Chief resident general surgery\"    Nights  First call: \"General intern inpatients nights/wknd/hol\"  Second call: " "\"General surgery resident consult\"  Third call: Staff surgeon     "

## 2025-01-16 NOTE — PLAN OF CARE
Goal Outcome Evaluation:      Plan of Care Reviewed With: patient    Overall Patient Progress: no changeOverall Patient Progress: no change    Outcome Evaluation: Oxycodone given at 8:15am for pain.  Ibuprofen given as well at 9am.  Good appetitie, blood glucoses controlled.  Irritable at times.  Dressing changed by surgery this morning.  Complained of feeling high (as though on drugs) this afternoon at about 2 pm but denied taking any illicit substances. All VS stable. Checked on patient 30 minutes later and he was feeling like he was back to normal.

## 2025-01-17 LAB
ANION GAP SERPL CALCULATED.3IONS-SCNC: 8 MMOL/L (ref 7–15)
BUN SERPL-MCNC: 18.4 MG/DL (ref 8–23)
CALCIUM SERPL-MCNC: 8.7 MG/DL (ref 8.8–10.4)
CHLORIDE SERPL-SCNC: 101 MMOL/L (ref 98–107)
CREAT SERPL-MCNC: 0.72 MG/DL (ref 0.67–1.17)
EGFRCR SERPLBLD CKD-EPI 2021: >90 ML/MIN/1.73M2
GLUCOSE BLDC GLUCOMTR-MCNC: 116 MG/DL (ref 70–99)
GLUCOSE BLDC GLUCOMTR-MCNC: 118 MG/DL (ref 70–99)
GLUCOSE BLDC GLUCOMTR-MCNC: 134 MG/DL (ref 70–99)
GLUCOSE BLDC GLUCOMTR-MCNC: 137 MG/DL (ref 70–99)
GLUCOSE BLDC GLUCOMTR-MCNC: 177 MG/DL (ref 70–99)
GLUCOSE SERPL-MCNC: 150 MG/DL (ref 70–99)
HCO3 SERPL-SCNC: 24 MMOL/L (ref 22–29)
MAGNESIUM SERPL-MCNC: 2 MG/DL (ref 1.7–2.3)
PHOSPHATE SERPL-MCNC: 3.4 MG/DL (ref 2.5–4.5)
PLATELET # BLD AUTO: 537 10E3/UL (ref 150–450)
POTASSIUM SERPL-SCNC: 4.3 MMOL/L (ref 3.4–5.3)
SODIUM SERPL-SCNC: 133 MMOL/L (ref 135–145)

## 2025-01-17 PROCEDURE — 250N000013 HC RX MED GY IP 250 OP 250 PS 637

## 2025-01-17 PROCEDURE — 85049 AUTOMATED PLATELET COUNT: CPT | Performed by: STUDENT IN AN ORGANIZED HEALTH CARE EDUCATION/TRAINING PROGRAM

## 2025-01-17 PROCEDURE — 250N000013 HC RX MED GY IP 250 OP 250 PS 637: Performed by: STUDENT IN AN ORGANIZED HEALTH CARE EDUCATION/TRAINING PROGRAM

## 2025-01-17 PROCEDURE — 120N000002 HC R&B MED SURG/OB UMMC

## 2025-01-17 PROCEDURE — 250N000011 HC RX IP 250 OP 636: Performed by: STUDENT IN AN ORGANIZED HEALTH CARE EDUCATION/TRAINING PROGRAM

## 2025-01-17 PROCEDURE — 83735 ASSAY OF MAGNESIUM: CPT | Performed by: SURGERY

## 2025-01-17 PROCEDURE — 84100 ASSAY OF PHOSPHORUS: CPT | Performed by: SURGERY

## 2025-01-17 PROCEDURE — 82565 ASSAY OF CREATININE: CPT | Performed by: SURGERY

## 2025-01-17 PROCEDURE — 36592 COLLECT BLOOD FROM PICC: CPT | Performed by: STUDENT IN AN ORGANIZED HEALTH CARE EDUCATION/TRAINING PROGRAM

## 2025-01-17 PROCEDURE — 250N000013 HC RX MED GY IP 250 OP 250 PS 637: Performed by: SURGERY

## 2025-01-17 PROCEDURE — 250N000011 HC RX IP 250 OP 636

## 2025-01-17 PROCEDURE — 250N000013 HC RX MED GY IP 250 OP 250 PS 637: Performed by: NURSE PRACTITIONER

## 2025-01-17 PROCEDURE — 80048 BASIC METABOLIC PNL TOTAL CA: CPT | Performed by: SURGERY

## 2025-01-17 RX ORDER — MAGNESIUM OXIDE 400 MG/1
400 TABLET ORAL EVERY 4 HOURS
Status: COMPLETED | OUTPATIENT
Start: 2025-01-17 | End: 2025-01-17

## 2025-01-17 RX ADMIN — ACETAMINOPHEN 975 MG: 325 TABLET, FILM COATED ORAL at 14:42

## 2025-01-17 RX ADMIN — SENNOSIDES 2 TABLET: 8.6 TABLET, FILM COATED ORAL at 20:19

## 2025-01-17 RX ADMIN — OXYCODONE HYDROCHLORIDE 5 MG: 5 TABLET ORAL at 20:18

## 2025-01-17 RX ADMIN — MAGNESIUM OXIDE TAB 400 MG (241.3 MG ELEMENTAL MG) 400 MG: 400 (241.3 MG) TAB at 12:29

## 2025-01-17 RX ADMIN — QUETIAPINE FUMARATE 50 MG: 50 TABLET ORAL at 22:34

## 2025-01-17 RX ADMIN — PANTOPRAZOLE SODIUM 40 MG: 40 TABLET, DELAYED RELEASE ORAL at 08:32

## 2025-01-17 RX ADMIN — ENOXAPARIN SODIUM 40 MG: 40 INJECTION SUBCUTANEOUS at 20:21

## 2025-01-17 RX ADMIN — ACETAMINOPHEN 975 MG: 325 TABLET, FILM COATED ORAL at 22:34

## 2025-01-17 RX ADMIN — MAGNESIUM HYDROXIDE 30 ML: 400 SUSPENSION ORAL at 20:20

## 2025-01-17 RX ADMIN — MAGNESIUM OXIDE TAB 400 MG (241.3 MG ELEMENTAL MG) 400 MG: 400 (241.3 MG) TAB at 14:42

## 2025-01-17 RX ADMIN — Medication 5 ML: at 17:26

## 2025-01-17 RX ADMIN — SIMETHICONE 80 MG: 80 TABLET, CHEWABLE ORAL at 08:31

## 2025-01-17 RX ADMIN — SIMETHICONE 80 MG: 80 TABLET, CHEWABLE ORAL at 20:19

## 2025-01-17 RX ADMIN — MAGNESIUM HYDROXIDE 30 ML: 400 SUSPENSION ORAL at 08:36

## 2025-01-17 RX ADMIN — METHOCARBAMOL 750 MG: 750 TABLET ORAL at 20:19

## 2025-01-17 RX ADMIN — POLYETHYLENE GLYCOL 3350 17 G: 17 POWDER, FOR SOLUTION ORAL at 08:31

## 2025-01-17 RX ADMIN — Medication 5 MG: at 17:25

## 2025-01-17 RX ADMIN — SIMETHICONE 80 MG: 80 TABLET, CHEWABLE ORAL at 17:25

## 2025-01-17 RX ADMIN — METHOCARBAMOL 750 MG: 750 TABLET ORAL at 08:32

## 2025-01-17 RX ADMIN — Medication 5 ML: at 07:01

## 2025-01-17 RX ADMIN — OXYCODONE HYDROCHLORIDE 5 MG: 5 TABLET ORAL at 12:29

## 2025-01-17 RX ADMIN — LIDOCAINE 4% 1 PATCH: 40 PATCH TOPICAL at 20:25

## 2025-01-17 RX ADMIN — INSULIN GLARGINE 24 UNITS: 100 INJECTION, SOLUTION SUBCUTANEOUS at 08:32

## 2025-01-17 RX ADMIN — ACETAMINOPHEN 975 MG: 325 TABLET, FILM COATED ORAL at 05:44

## 2025-01-17 RX ADMIN — METHOCARBAMOL 750 MG: 750 TABLET ORAL at 14:42

## 2025-01-17 RX ADMIN — BICTEGRAVIR SODIUM, EMTRICITABINE, AND TENOFOVIR ALAFENAMIDE FUMARATE 1 TABLET: 50; 200; 25 TABLET ORAL at 14:45

## 2025-01-17 RX ADMIN — OXYCODONE HYDROCHLORIDE 5 MG: 5 TABLET ORAL at 05:44

## 2025-01-17 RX ADMIN — SENNOSIDES 2 TABLET: 8.6 TABLET, FILM COATED ORAL at 08:32

## 2025-01-17 RX ADMIN — SIMETHICONE 80 MG: 80 TABLET, CHEWABLE ORAL at 12:29

## 2025-01-17 ASSESSMENT — ACTIVITIES OF DAILY LIVING (ADL)
ADLS_ACUITY_SCORE: 36
ADLS_ACUITY_SCORE: 36
ADLS_ACUITY_SCORE: 38
ADLS_ACUITY_SCORE: 36
ADLS_ACUITY_SCORE: 38
ADLS_ACUITY_SCORE: 36
ADLS_ACUITY_SCORE: 38
ADLS_ACUITY_SCORE: 36
ADLS_ACUITY_SCORE: 36
ADLS_ACUITY_SCORE: 38
ADLS_ACUITY_SCORE: 36
ADLS_ACUITY_SCORE: 38
ADLS_ACUITY_SCORE: 36
ADLS_ACUITY_SCORE: 38
ADLS_ACUITY_SCORE: 36
ADLS_ACUITY_SCORE: 38
ADLS_ACUITY_SCORE: 36
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
## 2025-01-17 NOTE — PROGRESS NOTES
Surgery Progress Note  01/17/2025       Subjective:  - Abdominal discomfort  - No N/V     Objective:  Temp:  [98  F (36.7  C)-98.7  F (37.1  C)] 98.7  F (37.1  C)  Pulse:  [92-99] 92  Resp:  [16-22] 16  BP: (119-140)/(76-79) 119/77  SpO2:  [95 %-97 %] 95 %    I/O last 3 completed shifts:  In: 480 [P.O.:480]  Out: 275 [Urine:275]      Gen: Awake, alert, NAD  Resp: NLB on RA  Abd: soft, appropriately tender, mildly distended, midline incision dressing removed and changed   Incision: No purulent drainage, non-erythematous, with fibrinous tissue   Ext: WWP, no edema     Labs:  Recent Labs   Lab 01/14/25  0604 01/13/25  2351 01/13/25  1332 01/13/25  1235   WBC 10.9  --  11.3* 13.0*   HGB 7.7*  --  7.8* 5.2*   * 664* 714* 786*       Recent Labs   Lab 01/17/25  0144 01/16/25  2145 01/16/25  1714 01/16/25  0747 01/16/25  0553 01/15/25  0921 01/15/25  0550 01/14/25  0759 01/14/25  0604 01/13/25  1736 01/13/25  1235 01/12/25  1029 01/12/25  0609   NA  --   --   --   --   --   --  135  --   --   --  133*  --  133*   POTASSIUM  --   --   --   --  4.0  --  3.9  --  4.1  --  3.7  --  3.8   CHLORIDE  --   --   --   --   --   --  101  --   --   --  101  --  101   CO2  --   --   --   --   --   --  24  --   --   --  22  --  23   BUN  --   --   --   --   --   --  20.0  --   --   --  18.2  --  18.2   CR  --   --   --   --   --   --  0.70  --   --   --  0.65*  --  0.60*   * 146* 150*   < >  --    < > 145*   < >  --    < > 132*   < > 196*   LINDA  --   --   --   --   --   --  8.6*  --   --   --  8.3*  --  8.3*   MAG  --   --   --   --   --   --  1.8  --  2.1  --  1.7  --  1.7   PHOS  --   --   --   --  3.1  --  4.3  --  4.2  --  3.8  --  3.2    < > = values in this interval not displayed.       Imaging:  No new imaging within 24 hours.      Assessment/Plan:   59 year old male with PMHx T2DM, HIV on ART, and recent admission from 12/17-12/19 for SBO managed successfully with bowel rest/GGC who presented to ED again on 12/20  for recurrent SBO s/p ex lap/SBR on 12/20. Now s/p ex-lap/SBR/re-anastomosis for anastomotic leak on 12/24. Extubated 12/26 AM. Now with anterograde bowel function and oral intake.      POD# 28: Medically ready for discharge. Continue discharge planning. Addiction medicine SW to help with placement.     - Scheduled suppository and soap suds enema.  - Continue multimodal pain control   - Moderate Protein-Calorie Malnutrition: Continue regular diet and Boost supplements.  - Continue Biktarvy  - Wet to dry dressing changes BID  - Replete electrolytes PRN  - Bowel Regimen: Miralax BID, Senna BID, Milk of Mag BID, Dulcolax suppository PRN  - Endocrinology: Appreciate IP management and discharge recommendations  - Ambulate QID: Goal to be off 1:1 assist  - WOC consult  - Ongoing discussions: WOC VAC vs dressing changes vs Medical respit for discharge.  - Dispo: TCU referrals sent out, still awaiting placement. Appreciate SW help. Continue to follow-up with SWAT regarding Wet house vs Group home options.    Seen, examined, and discussed with chief resident, who will discuss with staff.  - - - - - - - - - - - - - - - - - -  Kait Pineda DO  [facility] Family Medicine PGY1

## 2025-01-17 NOTE — PROGRESS NOTES
Addiction Team Social Work Note    Date of  Intervention: 01/17/25  Collaborated with:  Dr. Abdi, Addiction Attending; Dr. Hill, Addiction Resident; patient; JOMAR Parker    Patient information: Addiction Medicine SW following for support, resources and linkage to care.    Intervention:  Chart reviewed.  Discussed pt's care and plan in Addiction team rounds and also with JOMAR Parker x2.      Writer met with pt for a supportive visit.  Informed him that writer attempted to access his e-mail account however unable to due not having the correct password.  Pt states he is familiar with this too and has been unable to access his own e-mail.    He asks for writer to contact Aguila at [facility] and would like a referral to [facility].  He's also open to a referral to [facility].      (Writer attempted to contact [facility] and website indicates that referrals are from street outreach teams).  So writer offered to make a referral to [facility], which writer had actually completed after our last conversation earlier this week.  Writer has not received any word yet from [facility].    Writer asked if pt would re-consider [facility] medical respite shelter given there is a Nurse on-site.  He states he is amenable to a referral.    Writer e-[facility]ed in a [email or web address]    Chrisr received a reply back from SENG Cavanaugh, with [facility] that they have no beds open currently.  Also, Mao is out of the office on Monday and will return on Tuesday.    Updated JOMAR Parker.    Assessment:   Support; resources; linkage.    Plan:    Anticipated Disposition:  To be determined.    Follow Up:  Writer will continue to follow.    Due to regulation of Title 42 of the Code of Federal Regulations (CFR) Part 2: Confidentiality laws apply to this note and the information wherein.  Thus, this note cannot be copy and pasted into any other  health care staff's note nor can it be included in general medical records sent to ANY outside agency without the patient's written consent.    Silas Brian [facility]  She/her/hers  Social Work Services  Inpatient Addiction Medicine Team  [phone] work cell phone  [phone] pager  8:00am-4:30pm M/T/Th/F; Off Wednesday's

## 2025-01-17 NOTE — PLAN OF CARE
"BP (!) 140/76 (BP Location: Left arm)   Pulse 99   Temp 98  F (36.7  C) (Oral)   Resp 22   Ht 1.626 m (5' 4\")   Wt 66.6 kg (146 lb 13.2 oz)   SpO2 95%   BMI 25.20 kg/m     Time: 1400-7686  Reason for admission: Small bowel obstruction.   Activity: SBA  Pain: Received scheduled robaxin, Tylenol,  lidocaine patch, and PRN Oxycodone x1.   Neuro: alert and oriented.   Cardiac: WDL X tachycardic.   Resp: denied SOB, lung sounds clear equal bilaterally.   GI/: on regular diet, voids without difficulties, bowel sounds present x four quadrants.   Lines: PICC, heparin locked.   Skin: ABD wound, dressing intact.   Labs/Imaging: no lab or  imaging this shift.   New changes to shift: BG at 2200, 146.   Plan: continue with current plan of cares.   "
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
## 2025-01-17 NOTE — DISCHARGE SUMMARY
[facility]  Discharge Summary  General Surgery     Andrew Collier MRN# [id number]   YOB: 1965 Age: 59 year old     Date of Admission:  12/20/2024  Date of Discharge::  1/17/2025  Admitting Physician:  Iris Garza MD  Discharge Physician:  Samuel Garcia MD  Primary Care Physician:        No Ref-Primary, Physician          Admission Diagnoses:   Small bowel obstruction (H) [K56.609]  Generalized abdominal pain [R10.84]  Nausea and vomiting, unspecified vomiting type [R11.2]          Discharge Diagnosis:   Small bowel obstruction (H) [K56.609]         Procedures:   Procedure(s):  Procedure 12/20/24: Laparotomy exploratory with small bowel resection., N/A - Abdomen   Procedure 12/24/2024: EXPLORATORY LAPAROTOMY, SMALL BOWEL RESECTION AND REANASTOMOSIS         Consultations:   NURSING TO CONSULT FOR VASCULAR ACCESS CARE IP CONSULT  SURGERY GENERAL ADULT IP CONSULT  SURGERY GENERAL ADULT IP CONSULT  CARE MANAGEMENT / SOCIAL WORK IP CONSULT  PHYSICAL THERAPY ADULT IP CONSULT  OCCUPATIONAL THERAPY ADULT IP CONSULT  CARE MANAGEMENT / SOCIAL WORK IP CONSULT  INTERNAL MEDICINE ADULT IP CONSULT FOR EAST BANK  PHARMACY IP CONSULT  NURSING TO CONSULT FOR VASCULAR ACCESS CARE IP CONSULT  NURSING TO CONSULT FOR VASCULAR ACCESS CARE IP CONSULT  NURSING TO CONSULT FOR VASCULAR ACCESS CARE IP CONSULT  NURSING TO CONSULT FOR VASCULAR ACCESS CARE IP CONSULT  NURSING TO CONSULT FOR VASCULAR ACCESS CARE IP CONSULT  PHARMACY IP CONSULT  INFECTIOUS DISEASE GENERAL ADULT IP CONSULT  NURSING TO CONSULT FOR VASCULAR ACCESS CARE IP CONSULT  NURSING TO CONSULT FOR VASCULAR ACCESS CARE IP CONSULT  VASCULAR ACCESS FOR PICC PLACEMENT ADULT IP CONSULT  NURSING TO CONSULT FOR VASCULAR ACCESS CARE IP CONSULT  PHARMACY IP CONSULT  SPEECH LANGUAGE PATH ADULT IP CONSULT  WOUND OSTOMY CONTINENCE NURSE  IP CONSULT  ENDOCRINE DIABETES ADULT IP CONSULT  PHARMACY IP CONSULT  PHARMACY IP  CONSULT  PHARMACY IP CONSULT  PHARMACY IP CONSULT  PHARMACY IP CONSULT  ADDICTION SERVICE ADULT IP CONSULT FOR EAST BANK  WOUND OSTOMY CONTINENCE NURSE  IP CONSULT  CHEMICAL DEPENDENCY IP CONSULT          Brief History of Illness:   59 year old male with PMHx T2DM, HIV on ART, and recent admission from 12/17-12/19 for SBO managed successfully with bowel rest/GGC who presented to ED again on 12/20 for recurrent SBO s/p ex lap/SBR on 12/20. Second procedure on 12/24/2024 with s/p ex-lap/SBR/re-anastomosis for anastomotic leak on 12/24.           Hospital Course:   The patient underwent exploratory laparotomy on 12/20/2024, which he tolerated well without immediate complications. He was transferred to the PACU and then floor for routine postoperative care. Postoperative course was complicated by poor pain control and a rapid response was called on POD#3 for somnolence. He was eventually taken back to the OR and intubated on POD#4 for concerns of septic encephalopathy. He had a repeat exploratory laparotomy on POD #4 with small bowel resection and reanastomosis.     He tolerated this second procedure well without immediate complications. He was transferred to the PACU and then floor for routine postoperative care. He was extubated and had his granados removed on 12/26/2024. He had return of bowel function on 12/28/2024 and his diet was slowly advanced. CT Abdomen and Pelvis w/ contrast on 12/30/2024 showed no signs of continued abscess and a wound vac was placed to help with healing. Wound vac was removed on 1/6 . Discharge was significantly prolonged due to difficulty finding placement for him due to global facility decline. SWAT and Addiction medicine were consulted to help aid with placement at this point in time. Patient education was attempted multiple times to ensure he knew how to change his dressing, however patient declined each attempt. Placement was found on POD #28 through [facility], however beds were  full, but pending placement for him to receive the next one. On POD#31 [facility] offered a shelter transfer with a bed in conjunction to [facility] still pending for a bed. Patient disagreed with this placement despite extensive search with successful discharge placement and coordination. Decision was made to continue with discharge as he was meeting all post-operative milestones. On the day of discharge, he was tolerating a regular diet, his pain was well controlled with oral pain medications, he was voiding spontaneously, and ambulating independently. Patient with follow up with Dr. Hernandez in clinic on 01/30/2025.           Imaging Studies:     Results for orders placed or performed during the hospital encounter of 12/20/24   Abdomen XR, 2 vw, flat and upright    Narrative    EXAMINATION: XR ABDOMEN 2 VIEWS  12/20/2024 1:56 PM      CLINICAL HISTORY: luq pain hx of recurrent sbo eval and also for free  air etc    COMPARISON: 12/18/2024    FINDINGS:    Supine and upright frontal x-ray of the abdomen.Nonspecific bowel gas  pattern. Relative paucity of bowel gas. No definite pneumoperitoneum.  Mild to moderate colonic stool burden. Focal metallic opacity projects  over the right lower quadrant similar to prior radiograph from  12/18/2024, indeterminate, possibly ingested.      Impression    IMPRESSION:   1. Nonspecific bowel gas pattern. Relative paucity of bowel gas. No  definite pneumoperitoneum. Mild to moderate colonic stool burden.  2. Focal metallic opacity projects over the right lower quadrant  similar to prior radiograph from 12/18/2024, indeterminate, possibly  ingested/postprocedure. Consider correlation.    I have personally reviewed the examination and initial interpretation  and I agree with the findings.    LAURYN FOURNIER MD         SYSTEM ID:  [id number]   CT Abdomen Pelvis w/o Contrast     Value    Radiologist flags mild apparent thickening of distal esophagus near    Narrative    EXAMINATION:  CT ABDOMEN PELVIS W/O CONTRAST, 12/20/2024 5:10 PM    INDICATION: eval for recurrent sbo    COMPARISON STUDY: 10/9/2024    TECHNIQUE: CT scan of the abdomen and pelvis was performed on  multidetector CT scanner using volumetric acquisition technique and  images were reconstructed in multiple planes with variable thickness  and reviewed on dedicated workstations.     CONTRAST: None   CT scan radiation dose is optimized to minimum requisite dose using  automated dose modulation techniques.    FINDINGS:    Lower thorax: Mild apparent thickening of the esophagus    Abdomen and pelvis:    Limited evaluation of abdominal parenchymal organs due to noncontrast  technique.    Liver: No mass. No intrahepatic biliary ductal dilation.    Biliary System: Cholelithiasis without findings of cholecystitis.    Pancreas: No mass or pancreatic ductal dilation.    Adrenal glands: No mass or nodules    Spleen: Not enlarged    Kidneys: No suspicious mass, obstructing calculus or hydronephrosis.    Gastrointestinal tract : Distention of the proximal stomach. Dilated  loop of small bowel measuring up to 4.7 cm in the central abdomen  compatible with bowel obstruction; with 2 apparent transition points  (4/26 and 4/35). Decompressed distal small bowel. Mild colonic stool  burden. No pneumatosis, mesenteric venous gas or pneumoperitoneum.  Post appendectomy.    Mesentery/peritoneum/retroperitoneum: No mass. No free fluid or air.    Lymph nodes: No new bulky adenopathy. Similar few scattered bilateral  inguinal lymph nodes, few subcentimeter para-aortic and pelvic lymph  nodes.    Vasculature: No abdominal aortic aneurysm.    Pelvis: Urinary bladder is partially distended.  Penile pump stable.   Prostatomegaly measuring 5.6 cm in transverse dimension.    Osseous structures: No aggressive or acute osseous lesion.      Soft tissues:Unchanged nodular density in the anterior right lower  quadrant subcutaneous soft tissue (3/140).      Impression     IMPRESSION:       1. Dilated loop of small bowel measuring up to 4.7 cm in the central  abdomen compatible with bowel obstruction; with 2 apparent transition  points, developing closed-loop obstruction not entirely excluded. No  pneumatosis, mesenteric venous gas or portal venous gas.     2. Additional findings similar to prior CT from 12/17/2024, accounting  for difference in technique; as detailed above with mild apparent  thickening of distal esophagus near the gastroesophageal junction,  underdistention versus esophagitis/esophageal pathology; consider  attention on follow-up.    Concern for close loop configuration of obstruction was communicated  to surgery resident on-call by Jeison on 12/20/2024      [Consider Follow Up: mild apparent thickening of distal esophagus near  the gastroesophageal junction]    This report will be copied to the [facility] to ensure a  provider acknowledges the finding. Access Center is available Monday  through Friday 8am-3:30 pm.    I have personally reviewed the examination and initial interpretation  and I agree with the findings.    LAURYN FOURNIER MD         SYSTEM ID:  [id number]   XR Chest Port 1 View    Narrative    Exam: XR CHEST PORT 1 VIEW, 12/23/2024 10:14 AM    Indication: tachycardia    Comparison: Chest radiograph 11/30/2074, additional priors    Findings:   An AP semiupright view of the chest was obtained. Low lung volumes.  The cardiomediastinal silhouette is enlarged. Bilateral blunting of  the costophrenic angles suggestive of small effusions. No  pneumothorax. New elevation of the right hemidiaphragm with bilateral  streaky interstitial opacities right more apparent than left. No acute  osseous abnormality. Multiple distended loops of large bowel in the  upper abdomen.      Impression    Impression:   1. Multiple distended loops of large bowel in the upper abdomen were  not visualized on most recent CT on 12/20/2024. This may represent  worsening  ileus/obstruction in this patient admitted for bowel  obstruction. Consider evaluation of the abdomen with a CT if desired.  2. Low lung volumes with right hemidiaphragm elevation favored to be  atelectasis/decreased inspiratory effort.  3. New cardiomegaly, however an element of this may be related to AP  technique.    I have personally reviewed the examination and initial interpretation  and I agree with the findings.    MATIAS LIZ MD         SYSTEM ID:  [id number]   XR Abdomen Port 1 View    Narrative    EXAMINATION:  XR ABDOMEN PORT 1 VIEW 12/23/2024     COMPARISON: Abdominal radiograph 12/20/2024, CT abdomen 12/20/2024.    HISTORY: NGT placement    TECHNIQUE: Frontal supine view of the abdomen.    FINDINGS: Gastric tube tip and side-port project over the stomach.  Dilated small bowel measuring 3.7 cm better appreciated on CT. No  abnormally dilated loops of bowel, free air, or pneumatosis in the  visualized abdomen. No portal venous gas.  Reticular attenuation in  the lungs.      Impression    IMPRESSION:   1. Gastric tube tip and side-port project over the stomach.  2. Dilated loops of small bowel measuring up to 3.7 cm as seen on CT.  3. Pulmonary edema.    I have personally reviewed the examination and initial interpretation  and I agree with the findings.    JANIE VANN MD         SYSTEM ID:  [id number]   XR Chest Port 1 View    Narrative    Portable chest    INDICATION: Shortness of breath    COMPARISON: Yesterday    FINDINGS: Right hemidiaphragm elevation persists with underlying low  inspiratory volumes reported taking again note. Heart size upper  normal. No pneumothorax. Linear densities in the lower lungs similar  to previous.      Impression    IMPRESSION: No significant changes from yesterday.    MATIAS LIZ MD         SYSTEM ID:  [id number]   XR Abdomen Port 1 View    Narrative    EXAMINATION: XR ABDOMEN PORT 1 VIEW  12/24/2024 9:13 AM     COMPARISON: Radiograph 12/23/2024. CT  12/20/2024.    HISTORY: SOB, concern ileus.    TECHNIQUE: Frontal view of the abdomen.    FINDINGS: Midline staple line. Similar mildly dilated loops of small  bowel projecting over the bilateral lower quadrants measuring up to  3.9 cm. No definite pneumatosis or portal venous gas.       Impression    IMPRESSION:   Mildly dilated loops of small bowel projecting over the bilateral  lower quadrants, may represent postoperative ileus although  obstruction cannot be excluded.    I have personally reviewed the examination and initial interpretation  and I agree with the findings.    BORIS PAYTON MD         SYSTEM ID:  [id number]   XR Abdomen Port 1 View    Narrative    Exam: XR ABDOMEN PORT 1 VIEW, 12/24/2024 1:27 PM    Indication: Confirm NGT position    Comparison: Abdominal radiograph 12/24/2024 at 08:26    Findings:   Portable supine AP radiographs of the abdomen. Orogastric tube tip and  sidehole projected within the stomach. Similar appearance of mildly  dilated loops of small bowel. No pneumatosis or portal venous gas.  Moderate stool burden. Subsequent closure staples overlying the mid  abdomen upper pelvis. No acute osseous abnormality. Lung bases are not  well-visualized.      Impression    Impression:   1. Orogastric tube tip and sidehole projected over the stomach. The  sidehole is near the GE junction, consider advancing 2-3 cm.  2. Persistent dilation of small bowel loops within the abdomen.  Findings likely represent postoperative ileus though obstruction  cannot be entirely excluded.     I have personally reviewed the examination and initial interpretation  and I agree with the findings.    ALFREDA SHIPLEY MD         SYSTEM ID:  [id number]   CT Chest/Abdomen/Pelvis w Contrast     Value    Radiologist flags (Urgent)     Free fluid and free air in the abdomen concerning for    Narrative    EXAMINATION: CT CHEST/ABDOMEN/PELVIS W CONTRAST, 12/24/2024 4:36 PM    TECHNIQUE:  Helical CT images from the  thoracic inlet through the  symphysis pubis were obtained with IV contrast.   Contrast dose: 96 mL [id number]    COMPARISON: Same day chest and abdominal radiographs, CT abdomen and  pelvis 12/20/2024, CT chest abdomen pelvis 2/6/2023.    HISTORY: POD#4 from ex lap, SBR for SBO. septic appearing, increased  WOB, lethargic. assess for infectious etiology    FINDINGS:  Lines and Tubes: None.    CHEST:  THYROID: Thyroid is unremarkable.    MEDIASTINUM: Heart size is within normal limits. No pericardial  effusion. Mild coronary vessel calcifications atherosclerosis  prominently of the left anterior descending. Normal caliber of the  aorta and pulmonary arteries. No mediastinal lymphadenopathy.    CHEST WALL: No suspicious axillary lymphadenopathy.    LUNGS/PLEURA: Small to moderate right pleural effusion. Compressive  atelectasis throughout the right lower lobe. Partial consolidation in  the left lower lobe with trace effusion. Ground glass pulmonary  opacities in the upper lobes bilaterally. 0.7 x 0.7 cm solid pulmonary  nodule in the right middle lobe (series 6, image 119), previously  represented on images from 2/6/2023 as a groundglass nodule measuring  approximately 0.5 x 0.4 cm. No pneumothorax. Central tracheobronchial  tree is patent.    ABDOMEN/PELVIS:  LIVER: No focal hepatic mass.    BILIARY: Cholelithiasis. No evidence of acute cholecystitis. No  biliary dilation.    PANCREAS: No focal pancreatic mass or ductal dilation.    SPLEEN: No splenic mass.    ADRENAL GLANDS: Within normal limits.    URINARY TRACT: No suspicious renal mass, renal calculi, or  hydronephrosis. No focal bladder wall thickening.    REPRODUCTIVE ORGANS: No suspicious pelvic mass. Implanted penile pump.    STOMACH: Within normal limits.    BOWEL: Dilated loop of small bowel in the central abdomen near  anastomosis which measures 5.1 cm in diameter with upstream transition  point anteriorly. Dilatation of the transverse colon measuring up  to  5.6 cm, likely due to ileus. The appendix is surgically absent.    PERITONEUM/FLUID: Free fluid throughout the abdomen and pelvis. Free  air along the liver. Scattered pockets of free air elsewhere.    VESSELS: No aneurysmal dilatation of the abdominal aorta.  The portal,  splenic, and superior mesenteric veins are patent.  The origins of the  celiac and superior mesenteric arteries are patent.    LYMPH NODES: Prominent central mesenteric lymph nodes do not meet size  criteria for enlargement.    BONES/SOFT TISSUES: Soft tissues are unremarkable. No acute osseous  abnormalities or aggressive osseous lesions.       Impression    IMPRESSION:  1. New small to moderate volume of free fluid with free air in the  abdomen is concerning for anastomotic leak in the setting of a short  segment of small bowel dilatation and edema near the surgical  anastomosis.  2. Multifocal groundglass opacities in the lungs are worrisome for  infection. Additionally, there is a small to moderate right pleural  effusion with significant compressive atelectasis of the right lower  lobe, and a small consolidated density in the left lower lobe.    [Urgent Result: Free fluid and free air in the abdomen concerning for  bowel rupture]    Finding was identified on 12/24/2024 4:55PM.     Eloina Jama and Nersi Parikh were contacted by Dr. Mahesh Ha at 12/24/2024 4:59 PM and verbalized understanding of the urgent  finding.     I have personally reviewed the examination and initial interpretation  and I agree with the findings.    BORIS PAYTON MD         SYSTEM ID:  [id number]   CT Head w/o Contrast    Narrative    CT HEAD W/O CONTRAST 12/24/2024 4:37 PM    History: POD#4 from ex lap, SBR for SBO. septic appearing, increased  WOB, lethargic. assess for infectious etiology vs stroke   ICD-10:    Comparison: Head CT 11/21/2024    Technique: Using multidetector thin collimation helical acquisition  technique, axial, coronal and sagittal  CT images from the skull base  to the vertex were obtained without intravenous contrast.   (topogram) image(s) also obtained and reviewed.    Findings: There is no intracranial hemorrhage, mass effect, or midline  shift. Gray/white matter differentiation in both cerebral hemispheres  is preserved. Chronic appearing small cortical infarcts in the right  frontal convexity such as seen on series 3 image 13 which was present  back in 11/21/2024. No obvious new loss of gray-white matter  differentiation. Small hypodensity in the left basal ganglia,  unchanged. Small calcification in the right parietal cortex unchanged  Ventricles are proportionate to the cerebral sulci. The basal cisterns  are clear.    The bony calvaria and the bones of the skull base are normal.  Postoperative changes of left occipital craniotomy. The visualized  portions of the paranasal sinuses and mastoid air cells are clear.      Impression    Impression:  No acute intracranial pathology. Small parietal cortical calcification  is unchanged. Scattered few chronic appearing gray-white matter  differentiation loss in the right frontal convexity similar to prior,  likely representing chronic tiny microinfarcts. No definite acute  infarct identified.    I have personally reviewed the examination and initial interpretation  and I agree with the findings.    BRUNILDA KENNEDY MD         SYSTEM ID:  [id number]   XR Chest Port 1 View    Narrative    Exam: XR CHEST PORT 1 VIEW, 12/24/2024 11:24 PM    Comparison: 12/24/2074 at 4:25 PM    History: line placement verification    Findings:  Portable AP view of the supine chest. Endotracheal tube is roughly 1.4  cm above the antonio. Right IJ CVC with tip in the right atrium.  Enteric tube courses beyond the field-of-view. Trachea is midline.  Cardiomediastinal silhouette is within normal limits. Bilateral  pleural effusions, right greater than left. Pneumoperitoneum is not  visible on this examination. No acute  osseous abnormalities.      Impression    Impression: Endotracheal tube is roughly 1.4 cm above the antonio.  Bilateral pleural effusions with diffuse bilateral pulmonary  opacities, right greater than left, not significantly changed from  recent CT.    I have personally reviewed the examination and initial interpretation  and I agree with the findings.    BORIS PAYTON MD         SYSTEM ID:  [id number]   XR Chest Port 1 View    Narrative    Exam: XR CHEST PORT 1 VIEW, 12/25/2024 5:27 AM    Comparison: 12/24/2024    History: Monitor pulmonary opacities    Findings:  Portable AP view of the semiupright chest. Endotracheal tube tip is  roughly 1.6 cm above the antonio. Right IJ CVC with tip at the  cavoatrial junction.     Low lung volumes. Trachea is midline. Cardiomediastinal silhouette is  stable . No significant change in perihilar opacities. No  pneumothorax. Decreased size of bilateral pleural effusions. The  visualized upper abdomen is unremarkable. No acute osseous  abnormalities.      Impression    Impression:   1. Endotracheal tube is roughly 1.6 cm above the antonio.  2. Decreased size of bilateral pleural effusions. No significant  change in perihilar opacities.    I have personally reviewed the examination and initial interpretation  and I agree with the findings.    BORIS PAYTON MD         SYSTEM ID:  [id number]   XR Chest Port 1 View    Narrative    Exam: XR CHEST PORT 1 VIEW, 12/26/2024 2:13 AM    Comparison: 12/25/2024    History: Monitor pulmonary opacities    Findings:  Portable AP view of the semiupright chest. Endotracheal tube is  roughly 3 cm above the antonio. Gastric tube with tip and sidehole in  the stomach. Right IJ CVC with tip at the cavoatrial junction. Trachea  is midline. Cardiomediastinal silhouette is stable. Similar perihilar  patchy opacities. Increased right pleural effusion. No pneumothorax.  The visualized upper abdomen is unremarkable. No acute osseous  abnormalities.       Impression    Impression:   1. Slight increase in right pleural effusion. No significant change in  perihilar mixed opacities.  2. Stable position of support devices.    I have personally reviewed the examination and initial interpretation  and I agree with the findings.    BORIS PAYTON MD         SYSTEM ID:  [id number]   XR Abdomen Port 1 View    Narrative    EXAMINATION: XR ABDOMEN PORT 1 VIEW 12/26/2024 5:41 AM     COMPARISON: 12/24/2024.    HISTORY: NG confirmation    TECHNIQUE: Frontal view of the abdomen.    FINDINGS: Enteric tube with tip and sidehole within the stomach. No  abnormally dilated loops of bowel. Air-filled loop of large bowel. No  pneumatosis or portal venous gas.       Impression    IMPRESSION: Enteric tube tip and sidehole project over the stomach.    I have personally reviewed the examination and initial interpretation  and I agree with the findings.    BORIS PAYTON MD         SYSTEM ID:  [id number]   XR Chest Port 1 View    Narrative    Exam: XR CHEST PORT 1 VIEW, 12/27/2024 1:00 AM    Comparison: 12/26/2024    History: Monitor pulmonary opacities    Findings:  Portable AP view of the semiupright chest. Interval extubation.  Gastric tube with tip and sidehole in the stomach. Right upper  extremity PICC line with tip at the cavoatrial junction.    Trachea is midline. Stable cardiomegaly. Similar perihilar opacities.  Similar right pleural effusion. No pneumothorax.      Impression    Impression:   1. Interval extubation. Right upper extremity PICC line at the  cavoatrial junction.  2. Similar size right pleural effusion. No significant change in  perihilar mixed opacities.    I have personally reviewed the examination and initial interpretation  and I agree with the findings.    LAURYN FOURNIER MD         SYSTEM ID:  [id number]   XR Chest Port 1 View    Narrative    Exam: XR CHEST PORT 1 VIEW, 12/28/2024 6:52 AM    Comparison: 12/27/2024    History: Monitor pulmonary  opacities    Findings:  Portable AP view of the semiupright chest. Enteric tube courses beyond  the field-of-view. Right upper extremity PICC line with tip at the  cavoatrial junction.    Increased opacification of the right lung with large right pleural  effusion. Trachea is midline. Cardiomediastinal silhouette is stably  enlarged. No pneumothorax. The visualized upper abdomen is  unremarkable. No acute osseous abnormalities.      Impression    Impression:   1. Increased opacification of the right lung with large right pleural  effusion. This favors compressive atelectasis, though infection cannot  be ruled out.  2. Stable cardiomegaly.    I have personally reviewed the examination and initial interpretation  and I agree with the findings.    JANIE VANN MD         SYSTEM ID:  [id number]   XR Chest Port 1 View    Narrative    Exam: XR CHEST PORT 1 VIEW, 12/29/2024 2:44 AM    Comparison: 12/28/2024    History: Monitor pulmonary opacities    Findings:  Portable AP view semiupright chest. Right upper extremity PICC line  with tip at the cavoatrial junction. Trachea is midline.  Cardiomediastinal silhouette is stable. Decreased opacification of the  right lung with moderate right pleural effusion. No pneumothorax.  Diffuse mixed interstitial and airspace opacities bilaterally. The  visualized upper abdomen is unremarkable. No acute osseous  abnormalities.      Impression    Impression:   1. Diffuse mixed interstitial and airspace opacities bilaterally  suggestive of pulmonary edema with interval decreased opacification of  the right lung and moderate size right pleural effusion.  2. Stable cardiomegaly.    I have personally reviewed the examination and initial interpretation  and I agree with the findings.    JANIE VANN MD         SYSTEM ID:  [id number]   XR Chest Port 1 View    Narrative    Exam: XR CHEST PORT 1 VIEW, 12/30/2024 2:01 AM    Indication: Monitor pulmonary opacities    Comparison: Chest x-ray  12/29/2024    Findings:   Portable AP radiograph of the chest at 45 degrees. Right upper  extremity PICC terminates at the cavoatrial junction. Trachea is  midline. The cardiac silhouette size is stable. Persistent patchy,  basilar predominant, interstitial and airspace opacities bilaterally  mild improved aeration of the right lung. Low lung volumes. Blunting  of the right greater the left costophrenic angle. No pneumothorax. No  acute osseous abnormality. Upper abdomen is unremarkable. Soft tissues  are unremarkable.      Impression    Impression:   1. Mildly decreased mixed interstitial and airspace opacities  bilaterally suggestive of pulmonary edema. Superimposed moderate right  and small left pleural effusions.  2. Stable cardiomegaly.    I have personally reviewed the examination and initial interpretation  and I agree with the findings.    JOSE VITAL DO         SYSTEM ID:  [id number]   CT Abdomen Pelvis w Contrast    Narrative    EXAMINATION: CT ABDOMEN PELVIS W CONTRAST, 12/30/2024 6:08 PM    INDICATION: SBO s/p repeat small bowel resection and ex lap. Assess  for fluid collection to guide abx duration.    COMPARISON STUDY: 12/24/2024    TECHNIQUE: CT scan of the abdomen and pelvis was performed on  multidetector CT scanner using volumetric acquisition technique and  images were reconstructed in multiple planes with variable thickness  and reviewed on dedicated workstations.     CONTRAST: iopamidol (ISOVUE-370) solution 105 mL injected IV without  oral contrast    CT scan radiation dose is optimized to minimum requisite dose using  automated dose modulation techniques.    FINDINGS:    Lower thorax: Bilateral pleural effusions with overlying atelectasis,  right greater than left. Patchy areas of groundglass consolidation of  the upper lobes, which may be atelectasis versus edema, cannot rule  out infection.     Liver: No mass. No intrahepatic biliary ductal dilation.  Extensive  subcapsular  fluid.    Biliary System: Calcified gallstone.. No extrahepatic biliary ductal  dilation.    Pancreas: No mass or pancreatic ductal dilation.    Adrenal glands: No mass or nodules    Spleen: Normal.    Kidneys: No suspicious mass, obstructing calculus or hydronephrosis.    Gastrointestinal tract : Postsurgical changes of bowel resection.  There is moderate mesenteric edema and ascites.  Normal appendix.  Small bowel wall edema likely related to sympathetic reaction from  ascites..    Mesentery/peritoneum/retroperitoneum: No mass. Mesenteric edema.  Postsurgical pneumoperitoneum.    Lymph nodes: Multiple sub-5 mm mesenteric and retroperitoneal lymph  nodes. Likely reactive.. Subcentimeter distal external iliac chain  lymph nodes bilaterally again likely reactive.    Vasculature: Patent major abdominal vasculature.    Pelvis: Urinary bladder is normal.  Penile prosthesis.    Osseous structures: No aggressive or acute osseous lesion.      Soft tissues: Within normal limits.      Impression    IMPRESSION:   1. Postsurgical changes of small bowel resection. There is moderate  amount of mesenteric edema and ascites. No drainable fluid collection.  Small amount of postsurgical pneumoperitoneum.  2. No significant change in multifocal groundglass opacities of the  lungs, which may indicate infection versus atelectasis/edema.   3. Small-to-moderate bilateral pleural effusions with overlying  compressive atelectasis.  4. Prominent subcapsular fluid adjacent to the liver.    I have personally reviewed the examination and initial interpretation  and I agree with the findings.    MATIAS LIZ MD         SYSTEM ID:  [id number]   XR Chest Port 1 View    Narrative    A Exam: XR CHEST PORT 1 VIEW, 12/31/2024 6:59 AM    Indication: Monitor pulmonary opacities    Comparison: Chest x-ray 12/30/2024    Findings:     Portable AP supine radiograph of the chest. Right upper extremity PICC  tip terminating at the cavoatrial junction.  Trachea is midline. The  cardiomediastinal silhouette is stable increased right mid and lower  zone pulmonary opacities. Persistent right effusion. No pneumothorax.  Mild elevation of the right hemidiaphragm. Persistent gaseous  distention of the stomach.      Impression    Impression:     1. Increased right perihilar and basilar opacities may represent  atelectasis, edema and/or infiltrate  2. Persistent small right pleural effusion    I have personally reviewed the examination and initial interpretation  and I agree with the findings.    LAURYN FOURNIER MD         SYSTEM ID:  [id number]   XR Abdomen Port 1 View    Narrative    EXAMINATION:  XR ABDOMEN PORT 1 VIEW 1/10/2025     COMPARISON: CT abdomen/pelvis 12/30/2024.    HISTORY: Increasing new abdominal pain    TECHNIQUE: Frontal supine view of the abdomen.    FINDINGS: Postsurgical changes of bowel resection and anastomosis  better appreciated on CT. Penile prosthesis. One loop of air distended  small bowel in the midabdomen above the upper limit of normal without  overt obstructive pattern. No abnormal distention of the colon which  has a moderate stool burden.  No portal venous gas.        Impression    IMPRESSION:   Mildly air distended loops of small and large bowel is nonspecific. No  high-grade obstruction.    I have personally reviewed the examination and initial interpretation  and I agree with the findings.    ASH KAY DO         SYSTEM ID:  [id number]   XR Chest Port 1 View    Narrative    Exam: XR CHEST PORT 1 VIEW, 1/10/2025 10:44 AM    Indication: Hx pleural effusions, new onset abdominal pain    Comparison: Chest radiograph 12/31/2024, additional priors    Findings:   An AP radiograph of the chest was obtained. Right PICC line terminates  in the cavoatrial junction. Stable cardiomegaly. Small right pleural  effusion. No significant left pleural effusion. No pneumothorax. Near  resolution of right middle and lower zone patchy opacities.  Persistent  streaky bilateral perihilar opacities. Reticular opacities in the left  upper lung field are more prominent. No acute osseous abnormality.  Metallic densities and radiopaque tubular structure crossing the left  hemidiaphragm posteriorly favored to be external.      Impression    Impression:   1. More prominent reticular opacities in left upper lung fields better  characterized on CT from 12/24/2024, may represent infection versus  scarring.  2. Improved opacities in the right middle and lower lung fields.  Persistent bilateral streaky opacities suggestive of atelectasis  and/or edema.  3. Stable cardiomegaly.    I have personally reviewed the examination and initial interpretation  and I agree with the findings.    JOSEFINA GALAN MD         SYSTEM ID:  [id number]   XR Abdomen Port 1 View    Narrative    EXAMINATION:  XR ABDOMEN PORT 1 VIEW 1/12/2025     COMPARISON: 1/10/2025.    HISTORY: Pod19 from ex lap and redo anastomosis from prev bowel  resection for SBO. AXR for new bloating.    TECHNIQUE: One frontal supine view of the abdomen.    FINDINGS: No abnormally dilated air-filled loops of bowel.  Mild-to-moderate colonic stool burden. The lung bases are  unremarkable.       Impression    IMPRESSION:   Non-obstructive bowel gas pattern.    I have personally reviewed the examination and initial interpretation  and I agree with the findings.    JOSE VITAL DO         SYSTEM ID:  [id number]     *Note: Due to a large number of results and/or encounters for the requested time period, some results have not been displayed. A complete set of results can be found in Results Review.              Medications Prior to Admission:     Medications Prior to Admission   Medication Sig Dispense Refill Last Dose/Taking    bictegravir-emtricitabine-tenofovir (BIKTARVY) -25 MG per tablet Take 1 tablet by mouth daily. 30 tablet 2 Past Month    famotidine (PEPCID) 40 MG tablet Take 40 mg by mouth daily as needed for  heartburn.   Past Month    insulin aspart (NOVOLOG PEN) 100 UNIT/ML pen Inject 10 Units subcutaneously 3 times daily (with meals). Take only when you have access to a meal. 15 mL 3 Past Month    insulin glargine (LANTUS PEN) 100 UNIT/ML pen Inject 25 Units subcutaneously every morning. REDUCE TO 15 UNITS IF YOU THINK YOU WILL NOT HAVE FOOD THAT DAY. (Patient taking differently: Inject 27 Units subcutaneously every morning. REDUCE TO 15 UNITS IF YOU THINK YOU WILL NOT HAVE FOOD THAT DAY.) 15 mL 3 Past Month    albuterol (PROAIR HFA/PROVENTIL HFA/VENTOLIN HFA) 108 (90 Base) MCG/ACT inhaler Inhale 2 puffs into the lungs every 6 hours as needed for shortness of breath, wheezing or cough. 18 g 0     albuterol (PROAIR HFA/PROVENTIL HFA/VENTOLIN HFA) 108 (90 Base) MCG/ACT inhaler Inhale 2 puffs into the lungs every 6 hours as needed for shortness of breath, wheezing or cough. 18 g 0     blood glucose (NO BRAND SPECIFIED) lancets standard Use to test blood sugar 1 time daily or as directed. 100 Lancet 4     blood glucose (NO BRAND SPECIFIED) test strip Use to test blood sugar 1 time daily or as directed. 100 strip 4     blood glucose monitoring (NO BRAND SPECIFIED) meter device kit Use to test blood sugar 3 times daily or as directed. 1 kit 0     Continuous Glucose Sensor (DEXCOM G6 SENSOR) MISC 1 each every 10 days Change every 10 days. 3 each 11     Continuous Glucose Sensor (DEXCOM G7 SENSOR) MISC 1 Device every 10 days. 9 each 3     Continuous Glucose Transmitter (DEXCOM G6 TRANSMITTER) MISC 1 each every 3 months Change every 3 months. 1 each 3     insulin pen needle (32G X 4 MM) 32G X 4 MM miscellaneous Use 4-5 pen needles daily or as directed. 100 each 2     naproxen (NAPROSYN) 500 MG tablet Take 1 tablet (500 mg) by mouth 2 times daily as needed for moderate pain. Take with meals. 30 tablet 0     Nutritional Supplements (ENSURE ACTIVE) LIQD Take 414 mLs by mouth daily 30 mL 11     pantoprazole (PROTONIX) 40 MG EC  tablet Take 1 tablet (40 mg) by mouth 2 times daily (before meals). 60 tablet 0     polymixin b-trimethoprim (POLYTRIM) 29846-1.1 UNIT/ML-% ophthalmic solution Place 2 drops Into the left eye every 2 hours (while awake). 10 mL 0               Discharge Medications:     Current Discharge Medication List        CONTINUE these medications which have NOT CHANGED    Details   bictegravir-emtricitabine-tenofovir (BIKTARVY) -25 MG per tablet Take 1 tablet by mouth daily.  Qty: 30 tablet, Refills: 2    Associated Diagnoses: Human immunodeficiency virus I infection (H)      famotidine (PEPCID) 40 MG tablet Take 40 mg by mouth daily as needed for heartburn.      insulin aspart (NOVOLOG PEN) 100 UNIT/ML pen Inject 10 Units subcutaneously 3 times daily (with meals). Take only when you have access to a meal.  Qty: 15 mL, Refills: 3    Associated Diagnoses: Type 2 diabetes mellitus with other specified complication, with long-term current use of insulin (H)      insulin glargine (LANTUS PEN) 100 UNIT/ML pen Inject 25 Units subcutaneously every morning. REDUCE TO 15 UNITS IF YOU THINK YOU WILL NOT HAVE FOOD THAT DAY.  Qty: 15 mL, Refills: 3    Comments: If Lantus is not covered by insurance, may substitute Basaglar or Semglee or other insulin glargine product per insurance preference at same dose and frequency.    Associated Diagnoses: Type 2 diabetes mellitus with other specified complication, with long-term current use of insulin (H)      !! albuterol (PROAIR HFA/PROVENTIL HFA/VENTOLIN HFA) 108 (90 Base) MCG/ACT inhaler Inhale 2 puffs into the lungs every 6 hours as needed for shortness of breath, wheezing or cough.  Qty: 18 g, Refills: 0    Comments: Pharmacy may dispense brand covered by insurance (Proair, or proventil or ventolin or generic albuterol inhaler)      !! albuterol (PROAIR HFA/PROVENTIL HFA/VENTOLIN HFA) 108 (90 Base) MCG/ACT inhaler Inhale 2 puffs into the lungs every 6 hours as needed for shortness of  breath, wheezing or cough.  Qty: 18 g, Refills: 0    Comments: Pharmacy may dispense brand covered by insurance (Proair, or proventil or ventolin or generic albuterol inhaler)      blood glucose (NO BRAND SPECIFIED) lancets standard Use to test blood sugar 1 time daily or as directed.  Qty: 100 Lancet, Refills: 4    Associated Diagnoses: Type 2 diabetes mellitus with hyperglycemia, with long-term current use of insulin (H)      blood glucose (NO BRAND SPECIFIED) test strip Use to test blood sugar 1 time daily or as directed.  Qty: 100 strip, Refills: 4    Associated Diagnoses: Type 2 diabetes mellitus with hyperglycemia, with long-term current use of insulin (H)      blood glucose monitoring (NO BRAND SPECIFIED) meter device kit Use to test blood sugar 3 times daily or as directed.  Qty: 1 kit, Refills: 0    Associated Diagnoses: Type 2 diabetes mellitus with hyperglycemia, with long-term current use of insulin (H)      !! Continuous Glucose Sensor (DEXCOM G6 SENSOR) MISC 1 each every 10 days Change every 10 days.  Qty: 3 each, Refills: 11    Associated Diagnoses: Type 2 diabetes mellitus with hyperglycemia, with long-term current use of insulin (H)      !! Continuous Glucose Sensor (DEXCOM G7 SENSOR) MISC 1 Device every 10 days.  Qty: 9 each, Refills: 3    Associated Diagnoses: Type 2 diabetes mellitus with hyperglycemia, with long-term current use of insulin (H)      Continuous Glucose Transmitter (DEXCOM G6 TRANSMITTER) MISC 1 each every 3 months Change every 3 months.  Qty: 1 each, Refills: 3    Associated Diagnoses: Type 2 diabetes mellitus with hyperglycemia, with long-term current use of insulin (H)      insulin pen needle (32G X 4 MM) 32G X 4 MM miscellaneous Use 4-5 pen needles daily or as directed.  Qty: 100 each, Refills: 2    Associated Diagnoses: Type 2 diabetes mellitus with hyperglycemia, with long-term current use of insulin (H)      naproxen (NAPROSYN) 500 MG tablet Take 1 tablet (500 mg) by mouth 2  times daily as needed for moderate pain. Take with meals.  Qty: 30 tablet, Refills: 0      Nutritional Supplements (ENSURE ACTIVE) LIQD Take 414 mLs by mouth daily  Qty: 30 mL, Refills: 11    Associated Diagnoses: Type 2 diabetes mellitus with hyperglycemia, with long-term current use of insulin (H); Human immunodeficiency virus I infection (H)      pantoprazole (PROTONIX) 40 MG EC tablet Take 1 tablet (40 mg) by mouth 2 times daily (before meals).  Qty: 60 tablet, Refills: 0    Associated Diagnoses: Gastroesophageal reflux disease with esophagitis, unspecified whether hemorrhage      polymixin b-trimethoprim (POLYTRIM) 62281-1.1 UNIT/ML-% ophthalmic solution Place 2 drops Into the left eye every 2 hours (while awake).  Qty: 10 mL, Refills: 0       !! - Potential duplicate medications found. Please discuss with provider.                  Medications Discontinued or Adjusted During This Hospitalization:   See medications above.            Antibiotics Prescribed at Discharge:   None prescribed           Day of Discharge Physical Exam:   Temp:  [98  F (36.7  C)-98.7  F (37.1  C)] 98.7  F (37.1  C)  Pulse:  [92-99] 92  Resp:  [16-22] 16  BP: (119-140)/(76-79) 119/77  SpO2:  [95 %-97 %] 95 %    General: awake, alert, no acute distress, resting comfortably in bed   CV: warm, well perfused   Pulm: breathing comfortably on room air   Abdomen: soft, non-distended, appropriately tender, no rebound or guarding;  Incision: No purulent drainage, non-erythematous, with fibrinous tissue    Extremities: no edema, moving all extremities spontaneously and without apparent deficit            Final Pathology Result:   Procedure from 12/20/2024:   Small bowel with stricture, resection:  - Small bowel segment with benign stricture and no other histopathologic abnormality  - Viable, unremarkable surgical resection margins    Procedure from 12/24/2024:  Mid small bowel, resection:  - Small bowel anastomosis with mucosal ischemic changes  and acute serositis  - Viable surgical resection margins with acute serositis  - Negative for dysplasia or malignancy           Discharge Instructions and Follow-Up:     Discharge Procedure Orders   Walker Order     Order Specific Question Answer Comments   Medical Equipment (DME) Supplier: [facility] [facility]    PATIENT INSTRUCTIONS: If you did not receive this ordered item today, please contact [employer] [facility] for availability ([place] Locations: [phone], [place]: [phone]).    Start Date: 2025    Walker Type: Standard (2 Wheel)    Diagnosis: R26.89 - Impaired Gait and Mobility               Home Health Care:     Not available           Discharge Disposition:     Discharged to shelter.       Condition at discharge: Stable    Patient was seen, examined, and discussed on day of discharge with chief resident Dr. Valentine, who discussed with staff surgeon Dr. Garcia.    Viv Angel, MS3  [facility] [employer]     ------------------------------------------------------------------------------------------------------    Note was reviewed with appropriate changes made to continue to accurately reflect medical information and hospital course.    Koby Garrido MD  General Surgery, PGY-1  [facility]

## 2025-01-17 NOTE — PLAN OF CARE
Temp: 98.7  F (37.1  C) Temp src: Oral BP: 119/77 Pulse: 92   Resp: 16 SpO2: 95 % O2 Device: None (Room air)       Hours of care: 6675-5010    D: Presented to ED on 12/20/24 for recurrent SBO s/p ex lap/SBR on 12/20. Now s/p ex-lap/SBR/re-anastomosis for anastomotic leak on 12/24. Extubated 12/26 AM. Now with anterograde bowel function and improved oral intake.     I/A: Andrew (he/him) is A/O x4. Calls appropriately, can be irritable and uncooperative at times, pt refused midnight lab draw. No tele orders. VSS on RA. R TL PICC in place heparin locked. Abdominal incision with packing, dressing CDI. No new skin deficits noted. Tolerating regular diet. Up SBA/Ind in room. Voiding in toilet, no BM this shift. Appeared to sleep well overnight.    Changed:  Running:   PRN:    P: Continue to monitor and follow POC. Notify General surgery with changes.    Goal Outcome Evaluation:    Plan of Care Reviewed With: patient  Overall Patient Progress: no change  Outcome Evaluation: VSS on RA. Can be irritable. Medically ready for discharge, main barrier is BID wet to dry dressing changes on his abdomen wound that pt will need to learn to do himself.

## 2025-01-17 NOTE — PROVIDER NOTIFICATION
C/o PICC line site pain, according to the patient,  it has been hurting him for two days, he wants us to remove it now or  according to the patient, he will remove it by himself, treatment team, Dusty Bangura MD notified.

## 2025-01-18 LAB
GLUCOSE BLDC GLUCOMTR-MCNC: 125 MG/DL (ref 70–99)
GLUCOSE BLDC GLUCOMTR-MCNC: 139 MG/DL (ref 70–99)
GLUCOSE BLDC GLUCOMTR-MCNC: 147 MG/DL (ref 70–99)
GLUCOSE BLDC GLUCOMTR-MCNC: 164 MG/DL (ref 70–99)
GLUCOSE BLDC GLUCOMTR-MCNC: 212 MG/DL (ref 70–99)
HOLD SPECIMEN: NORMAL
MAGNESIUM SERPL-MCNC: 2 MG/DL (ref 1.7–2.3)
PHOSPHATE SERPL-MCNC: 4.4 MG/DL (ref 2.5–4.5)
POTASSIUM SERPL-SCNC: 3.9 MMOL/L (ref 3.4–5.3)

## 2025-01-18 PROCEDURE — 250N000013 HC RX MED GY IP 250 OP 250 PS 637: Performed by: STUDENT IN AN ORGANIZED HEALTH CARE EDUCATION/TRAINING PROGRAM

## 2025-01-18 PROCEDURE — 250N000013 HC RX MED GY IP 250 OP 250 PS 637: Performed by: SURGERY

## 2025-01-18 PROCEDURE — 250N000011 HC RX IP 250 OP 636: Performed by: STUDENT IN AN ORGANIZED HEALTH CARE EDUCATION/TRAINING PROGRAM

## 2025-01-18 PROCEDURE — 120N000002 HC R&B MED SURG/OB UMMC

## 2025-01-18 PROCEDURE — 36592 COLLECT BLOOD FROM PICC: CPT | Performed by: STUDENT IN AN ORGANIZED HEALTH CARE EDUCATION/TRAINING PROGRAM

## 2025-01-18 PROCEDURE — 83735 ASSAY OF MAGNESIUM: CPT | Performed by: STUDENT IN AN ORGANIZED HEALTH CARE EDUCATION/TRAINING PROGRAM

## 2025-01-18 PROCEDURE — 250N000011 HC RX IP 250 OP 636

## 2025-01-18 PROCEDURE — 250N000012 HC RX MED GY IP 250 OP 636 PS 637: Performed by: NURSE PRACTITIONER

## 2025-01-18 PROCEDURE — 250N000013 HC RX MED GY IP 250 OP 250 PS 637: Performed by: NURSE PRACTITIONER

## 2025-01-18 PROCEDURE — 250N000013 HC RX MED GY IP 250 OP 250 PS 637

## 2025-01-18 PROCEDURE — 84132 ASSAY OF SERUM POTASSIUM: CPT | Performed by: STUDENT IN AN ORGANIZED HEALTH CARE EDUCATION/TRAINING PROGRAM

## 2025-01-18 PROCEDURE — 84100 ASSAY OF PHOSPHORUS: CPT | Performed by: STUDENT IN AN ORGANIZED HEALTH CARE EDUCATION/TRAINING PROGRAM

## 2025-01-18 RX ORDER — MAGNESIUM OXIDE 400 MG/1
400 TABLET ORAL EVERY 4 HOURS
Status: COMPLETED | OUTPATIENT
Start: 2025-01-18 | End: 2025-01-18

## 2025-01-18 RX ADMIN — QUETIAPINE FUMARATE 50 MG: 50 TABLET ORAL at 21:16

## 2025-01-18 RX ADMIN — INSULIN GLARGINE 24 UNITS: 100 INJECTION, SOLUTION SUBCUTANEOUS at 09:20

## 2025-01-18 RX ADMIN — SIMETHICONE 80 MG: 80 TABLET, CHEWABLE ORAL at 21:03

## 2025-01-18 RX ADMIN — SENNOSIDES 2 TABLET: 8.6 TABLET, FILM COATED ORAL at 09:19

## 2025-01-18 RX ADMIN — METHOCARBAMOL 750 MG: 750 TABLET ORAL at 21:03

## 2025-01-18 RX ADMIN — POLYETHYLENE GLYCOL 3350 17 G: 17 POWDER, FOR SOLUTION ORAL at 09:19

## 2025-01-18 RX ADMIN — LIDOCAINE 4% 1 PATCH: 40 PATCH TOPICAL at 21:04

## 2025-01-18 RX ADMIN — METHOCARBAMOL 750 MG: 750 TABLET ORAL at 09:19

## 2025-01-18 RX ADMIN — MAGNESIUM HYDROXIDE 30 ML: 400 SUSPENSION ORAL at 09:18

## 2025-01-18 RX ADMIN — ACETAMINOPHEN 975 MG: 325 TABLET, FILM COATED ORAL at 13:27

## 2025-01-18 RX ADMIN — MAGNESIUM OXIDE TAB 400 MG (241.3 MG ELEMENTAL MG) 400 MG: 400 (241.3 MG) TAB at 13:27

## 2025-01-18 RX ADMIN — MAGNESIUM HYDROXIDE 30 ML: 400 SUSPENSION ORAL at 21:16

## 2025-01-18 RX ADMIN — ACETAMINOPHEN 975 MG: 325 TABLET, FILM COATED ORAL at 06:34

## 2025-01-18 RX ADMIN — ACETAMINOPHEN 975 MG: 325 TABLET, FILM COATED ORAL at 21:03

## 2025-01-18 RX ADMIN — ENOXAPARIN SODIUM 40 MG: 40 INJECTION SUBCUTANEOUS at 21:04

## 2025-01-18 RX ADMIN — BICTEGRAVIR SODIUM, EMTRICITABINE, AND TENOFOVIR ALAFENAMIDE FUMARATE 1 TABLET: 50; 200; 25 TABLET ORAL at 13:28

## 2025-01-18 RX ADMIN — METHOCARBAMOL 750 MG: 750 TABLET ORAL at 13:27

## 2025-01-18 RX ADMIN — PANTOPRAZOLE SODIUM 40 MG: 40 TABLET, DELAYED RELEASE ORAL at 09:19

## 2025-01-18 RX ADMIN — MAGNESIUM OXIDE TAB 400 MG (241.3 MG ELEMENTAL MG) 400 MG: 400 (241.3 MG) TAB at 09:19

## 2025-01-18 RX ADMIN — OXYCODONE HYDROCHLORIDE 5 MG: 5 TABLET ORAL at 15:55

## 2025-01-18 RX ADMIN — SIMETHICONE 80 MG: 80 TABLET, CHEWABLE ORAL at 15:55

## 2025-01-18 RX ADMIN — SIMETHICONE 80 MG: 80 TABLET, CHEWABLE ORAL at 13:33

## 2025-01-18 RX ADMIN — OXYCODONE HYDROCHLORIDE 5 MG: 5 TABLET ORAL at 21:16

## 2025-01-18 RX ADMIN — Medication 10 ML: at 15:57

## 2025-01-18 RX ADMIN — SENNOSIDES 2 TABLET: 8.6 TABLET, FILM COATED ORAL at 21:15

## 2025-01-18 RX ADMIN — SIMETHICONE 80 MG: 80 TABLET, CHEWABLE ORAL at 09:19

## 2025-01-18 ASSESSMENT — ACTIVITIES OF DAILY LIVING (ADL)
ADLS_ACUITY_SCORE: 36
ADLS_ACUITY_SCORE: 36
ADLS_ACUITY_SCORE: 39
ADLS_ACUITY_SCORE: 39
ADLS_ACUITY_SCORE: 41
ADLS_ACUITY_SCORE: 36
ADLS_ACUITY_SCORE: 41
ADLS_ACUITY_SCORE: 39
ADLS_ACUITY_SCORE: 41
ADLS_ACUITY_SCORE: 39
ADLS_ACUITY_SCORE: 36
ADLS_ACUITY_SCORE: 39
ADLS_ACUITY_SCORE: 41

## 2025-01-18 NOTE — PROGRESS NOTES
Surgery Progress Note  01/18/2025       Subjective:  - Abdominal discomfort  - No N/V     Objective:  Temp:  [98  F (36.7  C)-99.5  F (37.5  C)] 98.7  F (37.1  C)  Pulse:  [] 86  Resp:  [16-18] 18  BP: (121-138)/(71-82) 121/71  SpO2:  [95 %-98 %] 95 %    I/O last 3 completed shifts:  In: 450 [P.O.:440; I.V.:10]  Out: 2575 [Urine:2575]      Gen: Awake, alert, NAD  Resp: NLB on RA  Abd: soft, appropriately tender, mildly distended, midline incision dressing removed and changed   Incision: No purulent drainage, non-erythematous, with fibrinous tissue and some serous drainage. Dressing was replaced with dry kerlix & ABD pad.   Ext: WWP, no edema     Labs:  Recent Labs   Lab 01/17/25  0709 01/14/25  0604 01/13/25  2351 01/13/25  1332 01/13/25  1235   WBC  --  10.9  --  11.3* 13.0*   HGB  --  7.7*  --  7.8* 5.2*   * 624* 664* 714* 786*       Recent Labs   Lab 01/18/25  0211 01/17/25  2233 01/17/25  1717 01/17/25  0827 01/17/25  0709 01/16/25  0747 01/16/25  0553 01/15/25  0921 01/15/25  0550 01/14/25  0759 01/14/25  0604 01/13/25  1736 01/13/25  1235   NA  --   --   --   --  133*  --   --   --  135  --   --   --  133*   POTASSIUM  --   --   --   --  4.3  --  4.0  --  3.9  --  4.1  --  3.7   CHLORIDE  --   --   --   --  101  --   --   --  101  --   --   --  101   CO2  --   --   --   --  24  --   --   --  24  --   --   --  22   BUN  --   --   --   --  18.4  --   --   --  20.0  --   --   --  18.2   CR  --   --   --   --  0.72  --   --   --  0.70  --   --   --  0.65*   * 137* 116*   < > 150*   < >  --    < > 145*   < >  --    < > 132*   LINDA  --   --   --   --  8.7*  --   --   --  8.6*  --   --   --  8.3*   MAG  --   --   --   --  2.0  --   --   --  1.8  --  2.1  --  1.7   PHOS  --   --   --   --  3.4  --  3.1  --  4.3  --  4.2  --  3.8    < > = values in this interval not displayed.       Imaging:  No new imaging within 24 hours.      Assessment/Plan:   59 year old male with PMHx T2DM, HIV on ART, and  recent admission from 12/17-12/19 for SBO managed successfully with bowel rest/GGC who presented to ED again on 12/20 for recurrent SBO s/p ex lap/SBR on 12/20 and subsequently s/p ex-lap/SBR/re-anastomosis for anastomotic leak on 12/24. Extubated 12/26 AM. Currently floor status with antegrade bowel function.      POD# 29: Medically ready for discharge. Continue discharge planning. Addiction medicine SW to help with placement.     - Scheduled suppository and PRN soap suds enema.  - Continue multimodal pain control   - Moderate Protein-Calorie Malnutrition: Continue regular diet and Boost supplements.  - Continue Biktarvy  - Dry to dry dressing changes BID-- changing to dry kerlix with ABD pad since wound is a lot more moist now   - Replete electrolytes PRN  - Bowel Regimen: Miralax BID, Senna BID, Milk of Mag BID, Dulcolax suppository PRN  - Endocrinology: Appreciate IP management and discharge recommendations  - Ambulate QID: Goal to be off 1:1 assist  - WOCN following, last seen 1/13.   - Ongoing discussions: WOC VAC vs dressing changes vs Medical respit for discharge.  - Dispo: Appreciate SW help. May discharge to [facility] which patient is amenable to per SW note. Continue inpatient for now    Seen, examined, and discussed with chief resident, who will discuss with staff.  - - - - - - - - - - - - - - - - - -  Viv Angel, MS3  [employer]    I evaluated the patient and agree with the plan above.    Neris Parikh,   General Surgery Resident, PGY-2
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
## 2025-01-18 NOTE — PLAN OF CARE
Goal Outcome Evaluation:      Plan of Care Reviewed With: patient    Overall Patient Progress: improvingOverall Patient Progress: improving    Outcome Evaluation: Writer assumed care of pt from 0420-2931. A/Ox4, RA, VSS. Pt was sleeping when writer first checked in. Called for pain medication around 2000 and was given. Dressing change completed per wound care order. Surgery team met with pt at 0630 and changed pts dressing again. Was changed with Kerlix instead of hydrofera blue. Pt tolerated dressing change well. No acute changes occurred overnight. Please continue with POC.

## 2025-01-19 LAB
GLUCOSE BLDC GLUCOMTR-MCNC: 114 MG/DL (ref 70–99)
GLUCOSE BLDC GLUCOMTR-MCNC: 125 MG/DL (ref 70–99)
GLUCOSE BLDC GLUCOMTR-MCNC: 129 MG/DL (ref 70–99)
GLUCOSE BLDC GLUCOMTR-MCNC: 136 MG/DL (ref 70–99)
GLUCOSE BLDC GLUCOMTR-MCNC: 173 MG/DL (ref 70–99)
HOLD SPECIMEN: NORMAL
MAGNESIUM SERPL-MCNC: 2 MG/DL (ref 1.7–2.3)
PHOSPHATE SERPL-MCNC: 3.3 MG/DL (ref 2.5–4.5)
POTASSIUM SERPL-SCNC: 4.2 MMOL/L (ref 3.4–5.3)

## 2025-01-19 PROCEDURE — 250N000013 HC RX MED GY IP 250 OP 250 PS 637: Performed by: SURGERY

## 2025-01-19 PROCEDURE — 250N000013 HC RX MED GY IP 250 OP 250 PS 637: Performed by: STUDENT IN AN ORGANIZED HEALTH CARE EDUCATION/TRAINING PROGRAM

## 2025-01-19 PROCEDURE — 250N000013 HC RX MED GY IP 250 OP 250 PS 637

## 2025-01-19 PROCEDURE — 36592 COLLECT BLOOD FROM PICC: CPT | Performed by: STUDENT IN AN ORGANIZED HEALTH CARE EDUCATION/TRAINING PROGRAM

## 2025-01-19 PROCEDURE — 84100 ASSAY OF PHOSPHORUS: CPT | Performed by: STUDENT IN AN ORGANIZED HEALTH CARE EDUCATION/TRAINING PROGRAM

## 2025-01-19 PROCEDURE — 84132 ASSAY OF SERUM POTASSIUM: CPT | Performed by: STUDENT IN AN ORGANIZED HEALTH CARE EDUCATION/TRAINING PROGRAM

## 2025-01-19 PROCEDURE — 250N000013 HC RX MED GY IP 250 OP 250 PS 637: Performed by: NURSE PRACTITIONER

## 2025-01-19 PROCEDURE — 120N000002 HC R&B MED SURG/OB UMMC

## 2025-01-19 PROCEDURE — 250N000011 HC RX IP 250 OP 636: Performed by: STUDENT IN AN ORGANIZED HEALTH CARE EDUCATION/TRAINING PROGRAM

## 2025-01-19 PROCEDURE — 83735 ASSAY OF MAGNESIUM: CPT | Performed by: STUDENT IN AN ORGANIZED HEALTH CARE EDUCATION/TRAINING PROGRAM

## 2025-01-19 PROCEDURE — 250N000011 HC RX IP 250 OP 636

## 2025-01-19 RX ORDER — CALCIUM CARBONATE 500 MG/1
500 TABLET, CHEWABLE ORAL DAILY PRN
Status: DISCONTINUED | OUTPATIENT
Start: 2025-01-19 | End: 2025-01-20 | Stop reason: HOSPADM

## 2025-01-19 RX ORDER — MAGNESIUM OXIDE 400 MG/1
400 TABLET ORAL EVERY 4 HOURS
Status: COMPLETED | OUTPATIENT
Start: 2025-01-19 | End: 2025-01-19

## 2025-01-19 RX ADMIN — SIMETHICONE 80 MG: 80 TABLET, CHEWABLE ORAL at 17:05

## 2025-01-19 RX ADMIN — OXYCODONE HYDROCHLORIDE 5 MG: 5 TABLET ORAL at 23:11

## 2025-01-19 RX ADMIN — SIMETHICONE 80 MG: 80 TABLET, CHEWABLE ORAL at 08:11

## 2025-01-19 RX ADMIN — POLYETHYLENE GLYCOL 3350 17 G: 17 POWDER, FOR SOLUTION ORAL at 19:47

## 2025-01-19 RX ADMIN — SENNOSIDES 2 TABLET: 8.6 TABLET, FILM COATED ORAL at 08:11

## 2025-01-19 RX ADMIN — CALCIUM CARBONATE (ANTACID) CHEW TAB 500 MG 500 MG: 500 CHEW TAB at 23:49

## 2025-01-19 RX ADMIN — PANTOPRAZOLE SODIUM 40 MG: 40 TABLET, DELAYED RELEASE ORAL at 08:11

## 2025-01-19 RX ADMIN — OXYCODONE HYDROCHLORIDE 5 MG: 5 TABLET ORAL at 09:45

## 2025-01-19 RX ADMIN — INSULIN GLARGINE 24 UNITS: 100 INJECTION, SOLUTION SUBCUTANEOUS at 08:18

## 2025-01-19 RX ADMIN — ENOXAPARIN SODIUM 40 MG: 40 INJECTION SUBCUTANEOUS at 19:53

## 2025-01-19 RX ADMIN — BICTEGRAVIR SODIUM, EMTRICITABINE, AND TENOFOVIR ALAFENAMIDE FUMARATE 1 TABLET: 50; 200; 25 TABLET ORAL at 13:14

## 2025-01-19 RX ADMIN — SIMETHICONE 80 MG: 80 TABLET, CHEWABLE ORAL at 13:13

## 2025-01-19 RX ADMIN — ACETAMINOPHEN 975 MG: 325 TABLET, FILM COATED ORAL at 13:13

## 2025-01-19 RX ADMIN — OXYCODONE HYDROCHLORIDE 5 MG: 5 TABLET ORAL at 03:43

## 2025-01-19 RX ADMIN — MAGNESIUM OXIDE TAB 400 MG (241.3 MG ELEMENTAL MG) 400 MG: 400 (241.3 MG) TAB at 13:13

## 2025-01-19 RX ADMIN — MAGNESIUM OXIDE TAB 400 MG (241.3 MG ELEMENTAL MG) 400 MG: 400 (241.3 MG) TAB at 08:12

## 2025-01-19 RX ADMIN — METHOCARBAMOL 750 MG: 750 TABLET ORAL at 08:11

## 2025-01-19 RX ADMIN — SENNOSIDES 2 TABLET: 8.6 TABLET, FILM COATED ORAL at 19:49

## 2025-01-19 RX ADMIN — OXYCODONE HYDROCHLORIDE 5 MG: 5 TABLET ORAL at 17:08

## 2025-01-19 RX ADMIN — ACETAMINOPHEN 975 MG: 325 TABLET, FILM COATED ORAL at 05:56

## 2025-01-19 RX ADMIN — MAGNESIUM HYDROXIDE 30 ML: 400 SUSPENSION ORAL at 08:12

## 2025-01-19 RX ADMIN — Medication 5 ML: at 17:04

## 2025-01-19 RX ADMIN — ACETAMINOPHEN 975 MG: 325 TABLET, FILM COATED ORAL at 22:31

## 2025-01-19 RX ADMIN — SIMETHICONE 80 MG: 80 TABLET, CHEWABLE ORAL at 19:50

## 2025-01-19 RX ADMIN — MAGNESIUM HYDROXIDE 30 ML: 400 SUSPENSION ORAL at 19:49

## 2025-01-19 RX ADMIN — POLYETHYLENE GLYCOL 3350 17 G: 17 POWDER, FOR SOLUTION ORAL at 08:11

## 2025-01-19 RX ADMIN — METHOCARBAMOL 750 MG: 750 TABLET ORAL at 13:13

## 2025-01-19 RX ADMIN — QUETIAPINE FUMARATE 50 MG: 50 TABLET ORAL at 22:31

## 2025-01-19 RX ADMIN — METHOCARBAMOL 750 MG: 750 TABLET ORAL at 19:50

## 2025-01-19 RX ADMIN — LIDOCAINE 4% 1 PATCH: 40 PATCH TOPICAL at 19:50

## 2025-01-19 ASSESSMENT — ACTIVITIES OF DAILY LIVING (ADL)
ADLS_ACUITY_SCORE: 39
ADLS_ACUITY_SCORE: 36
ADLS_ACUITY_SCORE: 34
ADLS_ACUITY_SCORE: 39
ADLS_ACUITY_SCORE: 34
ADLS_ACUITY_SCORE: 36
ADLS_ACUITY_SCORE: 39
ADLS_ACUITY_SCORE: 34
ADLS_ACUITY_SCORE: 39
ADLS_ACUITY_SCORE: 34
ADLS_ACUITY_SCORE: 34
ADLS_ACUITY_SCORE: 39
ADLS_ACUITY_SCORE: 39
ADLS_ACUITY_SCORE: 36
ADLS_ACUITY_SCORE: 34
ADLS_ACUITY_SCORE: 34
ADLS_ACUITY_SCORE: 39
ADLS_ACUITY_SCORE: 36
ADLS_ACUITY_SCORE: 36

## 2025-01-19 NOTE — PLAN OF CARE
"Blood pressure 121/69, pulse 106, temperature 97.6  F (36.4  C), temperature source Oral, resp. rate 16, height 1.626 m (5' 4\"), weight 66.3 kg (146 lb 3.2 oz), SpO2 96%.Via RA       Patient A & O X 4 VSS abdominal pain manageable with scheduled medications and Oxycodone PRN . Appetite good , 136. 114.  Abdominal Dressing CDI . Right PICC intact, Patient up independently in room ambulated in hallway with walker x 3 and did fine . Void adequate, had bowel movement  X 2 . Continue with POC and update MD with change.         Plan possible discharge tomorrow .     Goal Outcome Evaluation:      Plan of Care Reviewed With: patient    Overall Patient Progress: improving Patient was in good mood today . Ambulated in hallway X 3 with walker .       "

## 2025-01-19 NOTE — PROGRESS NOTES
Surgery Progress Note  01/19/2025       Subjective:  - In better spirits this AM   - Minimal lightheadedness with blood draw this AM  - No N/V     Objective:  Temp:  [97.6  F (36.4  C)-98.9  F (37.2  C)] 97.6  F (36.4  C)  Pulse:  [] 106  Resp:  [16-18] 16  BP: (119-126)/(69-83) 121/69  SpO2:  [96 %-97 %] 96 %    I/O last 3 completed shifts:  In: 800 [P.O.:800]  Out: 700 [Urine:700]      Gen: Awake, alert, NAD  Resp: NLB on RA  Abd: soft, appropriately tender, mildly distended, midline incision dressing removed and changed  Incision: No purulent drainage, non-erythematous, with fibrinous tissue and some serous drainage. Dressing was replaced with dry kerlix & ABD pad, seems to be improving   Ext: WWP, no edema     Labs:  Recent Labs   Lab 01/17/25  0709 01/14/25  0604 01/13/25  2351 01/13/25  1332 01/13/25  1235   WBC  --  10.9  --  11.3* 13.0*   HGB  --  7.7*  --  7.8* 5.2*   * 624* 664* 714* 786*       Recent Labs   Lab 01/19/25  0621 01/19/25  0154 01/18/25  2204 01/18/25  1607 01/18/25  0917 01/18/25  0705 01/17/25  0827 01/17/25  0709 01/15/25  0921 01/15/25  0550 01/13/25  1736 01/13/25  1235   NA  --   --   --   --   --   --   --  133*  --  135  --  133*   POTASSIUM 4.2  --   --   --   --  3.9  --  4.3   < > 3.9   < > 3.7   CHLORIDE  --   --   --   --   --   --   --  101  --  101  --  101   CO2  --   --   --   --   --   --   --  24  --  24  --  22   BUN  --   --   --   --   --   --   --  18.4  --  20.0  --  18.2   CR  --   --   --   --   --   --   --  0.72  --  0.70  --  0.65*   GLC  --  173* 164* 212*   < >  --    < > 150*   < > 145*   < > 132*   LINDA  --   --   --   --   --   --   --  8.7*  --  8.6*  --  8.3*   MAG 2.0  --   --   --   --  2.0  --  2.0  --  1.8   < > 1.7   PHOS 3.3  --   --   --   --  4.4  --  3.4   < > 4.3   < > 3.8    < > = values in this interval not displayed.       Imaging:  No new imaging within 24 hours.      Assessment/Plan:   59 year old male with PMHx T2DM, HIV on ART,  "and recent admission from 12/17-12/19 for SBO managed successfully with bowel rest/GGC who presented to ED again on 12/20 for recurrent SBO s/p ex lap/SBR on 12/20 and subsequently s/p ex-lap/SBR/re-anastomosis for anastomotic leak on 12/24. Extubated 12/26 AM. Currently floor status with antegrade bowel function.      POD# 30: Medically ready for discharge. Continue discharge planning. Addiction medicine SW to help with placement.     - Scheduled suppository and PRN soap suds enema.  - Continue multimodal pain control   - Moderate Protein-Calorie Malnutrition: Continue regular diet and Boost supplements.  - Continue Biktarvy  - Dry to dry dressing changes BID-- changing to dry kerlix with ABD pad since wound is more moist now   - Replete electrolytes PRN  - Bowel Regimen: Miralax BID, Senna BID, Milk of Mag BID, Dulcolax suppository PRN  - Endocrinology: Appreciate IP management and discharge recommendations  - Ambulate QID: Goal to be off 1:1 assist  - WOCN following, last seen 1/13.   - Ongoing discussions: WOC VAC vs dressing changes vs Medical respit for discharge.  - Dispo: Appreciate SW help. May discharge to [facility] which patient is amenable to per SW note. Continue inpatient for now    Seen, examined, and discussed with chief resident, who will discuss with staff.  - - - - - - - - - - - - - - - - - -  Abe Hansen DO, MS   General Surgery, PGY 1    General surgery is primary. Do not look for \"signed in\" providers, please search \"General [facility]\" in [employer] and use listings as detailed below:     Daytime  First call: \"General surgery intern inpatients\"  Second call: \"General surgery resident consult\"  Third call: \"Chief resident general surgery\"    Nights  First call: \"General intern inpatients nights/wknd/hol\"  Second call: \"General surgery resident consult\"  Third call: Staff surgeon         "

## 2025-01-19 NOTE — PLAN OF CARE
Goal Outcome Evaluation:      Plan of Care Reviewed With: patient    Overall Patient Progress: no changeOverall Patient Progress: no change    Outcome Evaluation: Writer assumed care of pt from 6803-9810. A/Ox4, RA, VSS. Pt was awake when writer checked in, was okay doing assessments with writer during change of shift. Room had lots of trash and wrappers on ground, pt did not want assistance with cleaning room up. Abd dressing was CDI and already had been changed 2x (which is goal amount). Abd dressing changed this morning by surgery team at 0615. Pain managed with scheduled tylenol and PRN pain meds. No acute events occured this shift. Please continue with POC.

## 2025-01-19 NOTE — PLAN OF CARE
"Blood pressure 126/74, pulse 94, temperature 98.7  F (37.1  C), temperature source Oral, resp. rate 18, height 1.626 m (5' 4\"), weight 66.3 kg (146 lb 3.2 oz), SpO2 97%. Via RA       Patient A & O X 4  VSS no respiratory distress noted . Abdominal pain Manageable with Scheduled medications and ,Oxycodone PRN . Abdominal Dressing changed . Attempted x 2 to instruct abdominal dressing change ,Patient declined. Appetite good , 147, 212 insulin given per range . Patient up independently to bathroom. Void adequate, and had bowel movement X 1 . Call light within reach, able to make needs known. Continue with POC and update MD with change.           Goal Outcome Evaluation:      Plan of Care Reviewed With: patient    Overall Patient Progress: no change. Discharge plan pending .          "

## 2025-01-20 VITALS
DIASTOLIC BLOOD PRESSURE: 73 MMHG | SYSTOLIC BLOOD PRESSURE: 115 MMHG | TEMPERATURE: 97.8 F | WEIGHT: 146.61 LBS | RESPIRATION RATE: 15 BRPM | HEART RATE: 102 BPM | BODY MASS INDEX: 25.03 KG/M2 | HEIGHT: 64 IN | OXYGEN SATURATION: 96 %

## 2025-01-20 LAB
ALBUMIN SERPL BCG-MCNC: 2.8 G/DL (ref 3.5–5.2)
ALP SERPL-CCNC: 345 U/L (ref 40–150)
ALT SERPL W P-5'-P-CCNC: 23 U/L (ref 0–70)
ANION GAP SERPL CALCULATED.3IONS-SCNC: 8 MMOL/L (ref 7–15)
AST SERPL W P-5'-P-CCNC: 33 U/L (ref 0–45)
BILIRUB SERPL-MCNC: 0.3 MG/DL
BUN SERPL-MCNC: 17.5 MG/DL (ref 8–23)
CALCIUM SERPL-MCNC: 8.8 MG/DL (ref 8.8–10.4)
CHLORIDE SERPL-SCNC: 99 MMOL/L (ref 98–107)
CREAT SERPL-MCNC: 0.7 MG/DL (ref 0.67–1.17)
EGFRCR SERPLBLD CKD-EPI 2021: >90 ML/MIN/1.73M2
GLUCOSE BLDC GLUCOMTR-MCNC: 129 MG/DL (ref 70–99)
GLUCOSE BLDC GLUCOMTR-MCNC: 130 MG/DL (ref 70–99)
GLUCOSE BLDC GLUCOMTR-MCNC: 131 MG/DL (ref 70–99)
GLUCOSE SERPL-MCNC: 118 MG/DL (ref 70–99)
HCO3 SERPL-SCNC: 25 MMOL/L (ref 22–29)
HOLD SPECIMEN: NORMAL
INR PPP: 1.18 (ref 0.85–1.15)
MAGNESIUM SERPL-MCNC: 2 MG/DL (ref 1.7–2.3)
PHOSPHATE SERPL-MCNC: 3.7 MG/DL (ref 2.5–4.5)
PLATELET # BLD AUTO: 429 10E3/UL (ref 150–450)
POTASSIUM SERPL-SCNC: 4.1 MMOL/L (ref 3.4–5.3)
PREALB SERPL-MCNC: 13.5 MG/DL (ref 20–40)
PROT SERPL-MCNC: 7.6 G/DL (ref 6.4–8.3)
SODIUM SERPL-SCNC: 132 MMOL/L (ref 135–145)
TRIGL SERPL-MCNC: 131 MG/DL

## 2025-01-20 PROCEDURE — 250N000013 HC RX MED GY IP 250 OP 250 PS 637: Performed by: SURGERY

## 2025-01-20 PROCEDURE — 250N000013 HC RX MED GY IP 250 OP 250 PS 637

## 2025-01-20 PROCEDURE — 84134 ASSAY OF PREALBUMIN: CPT | Performed by: SURGERY

## 2025-01-20 PROCEDURE — 99233 SBSQ HOSP IP/OBS HIGH 50: CPT | Performed by: SURGERY

## 2025-01-20 PROCEDURE — 85610 PROTHROMBIN TIME: CPT | Performed by: SURGERY

## 2025-01-20 PROCEDURE — 84100 ASSAY OF PHOSPHORUS: CPT | Performed by: SURGERY

## 2025-01-20 PROCEDURE — 36415 COLL VENOUS BLD VENIPUNCTURE: CPT | Performed by: SURGERY

## 2025-01-20 PROCEDURE — 83735 ASSAY OF MAGNESIUM: CPT | Performed by: SURGERY

## 2025-01-20 PROCEDURE — 84478 ASSAY OF TRIGLYCERIDES: CPT | Performed by: SURGERY

## 2025-01-20 PROCEDURE — 250N000013 HC RX MED GY IP 250 OP 250 PS 637: Performed by: STUDENT IN AN ORGANIZED HEALTH CARE EDUCATION/TRAINING PROGRAM

## 2025-01-20 PROCEDURE — 250N000013 HC RX MED GY IP 250 OP 250 PS 637: Performed by: NURSE PRACTITIONER

## 2025-01-20 PROCEDURE — 84460 ALANINE AMINO (ALT) (SGPT): CPT | Performed by: SURGERY

## 2025-01-20 PROCEDURE — 84155 ASSAY OF PROTEIN SERUM: CPT | Performed by: SURGERY

## 2025-01-20 PROCEDURE — 85049 AUTOMATED PLATELET COUNT: CPT | Performed by: STUDENT IN AN ORGANIZED HEALTH CARE EDUCATION/TRAINING PROGRAM

## 2025-01-20 PROCEDURE — 84450 TRANSFERASE (AST) (SGOT): CPT | Performed by: SURGERY

## 2025-01-20 PROCEDURE — 250N000011 HC RX IP 250 OP 636

## 2025-01-20 PROCEDURE — 36592 COLLECT BLOOD FROM PICC: CPT | Performed by: STUDENT IN AN ORGANIZED HEALTH CARE EDUCATION/TRAINING PROGRAM

## 2025-01-20 RX ORDER — MAGNESIUM OXIDE 400 MG/1
400 TABLET ORAL EVERY 4 HOURS
Status: COMPLETED | OUTPATIENT
Start: 2025-01-20 | End: 2025-01-20

## 2025-01-20 RX ORDER — METHOCARBAMOL 750 MG/1
750 TABLET, FILM COATED ORAL 3 TIMES DAILY
Qty: 10 TABLET | Refills: 0 | Status: SHIPPED | OUTPATIENT
Start: 2025-01-20 | End: 2025-01-22

## 2025-01-20 RX ORDER — METHOCARBAMOL 750 MG/1
750 TABLET, FILM COATED ORAL 3 TIMES DAILY
Qty: 10 TABLET | Refills: 0 | Status: SHIPPED | OUTPATIENT
Start: 2025-01-20 | End: 2025-01-20

## 2025-01-20 RX ORDER — ACETAMINOPHEN 325 MG/1
975 TABLET ORAL EVERY 8 HOURS
Qty: 60 TABLET | Refills: 0 | Status: SHIPPED | OUTPATIENT
Start: 2025-01-20 | End: 2025-01-22

## 2025-01-20 RX ORDER — ACETAMINOPHEN 325 MG/1
975 TABLET ORAL EVERY 8 HOURS
Qty: 60 TABLET | Refills: 0 | Status: SHIPPED | OUTPATIENT
Start: 2025-01-20 | End: 2025-01-20

## 2025-01-20 RX ORDER — OXYCODONE HYDROCHLORIDE 5 MG/1
5 TABLET ORAL EVERY 6 HOURS PRN
Qty: 12 TABLET | Refills: 0 | Status: SHIPPED | OUTPATIENT
Start: 2025-01-20 | End: 2025-01-20

## 2025-01-20 RX ORDER — OXYCODONE HYDROCHLORIDE 5 MG/1
5 TABLET ORAL EVERY 6 HOURS PRN
Qty: 12 TABLET | Refills: 0 | Status: SHIPPED | OUTPATIENT
Start: 2025-01-20 | End: 2025-01-22

## 2025-01-20 RX ADMIN — OXYCODONE HYDROCHLORIDE 5 MG: 5 TABLET ORAL at 05:36

## 2025-01-20 RX ADMIN — INSULIN GLARGINE 24 UNITS: 100 INJECTION, SOLUTION SUBCUTANEOUS at 09:14

## 2025-01-20 RX ADMIN — PANTOPRAZOLE SODIUM 40 MG: 40 TABLET, DELAYED RELEASE ORAL at 09:16

## 2025-01-20 RX ADMIN — MAGNESIUM HYDROXIDE 30 ML: 400 SUSPENSION ORAL at 09:24

## 2025-01-20 RX ADMIN — SENNOSIDES 2 TABLET: 8.6 TABLET, FILM COATED ORAL at 09:16

## 2025-01-20 RX ADMIN — ACETAMINOPHEN 975 MG: 325 TABLET, FILM COATED ORAL at 05:36

## 2025-01-20 RX ADMIN — METHOCARBAMOL 750 MG: 750 TABLET ORAL at 09:16

## 2025-01-20 RX ADMIN — MAGNESIUM OXIDE TAB 400 MG (241.3 MG ELEMENTAL MG) 400 MG: 400 (241.3 MG) TAB at 12:01

## 2025-01-20 RX ADMIN — BICTEGRAVIR SODIUM, EMTRICITABINE, AND TENOFOVIR ALAFENAMIDE FUMARATE 1 TABLET: 50; 200; 25 TABLET ORAL at 14:03

## 2025-01-20 RX ADMIN — POLYETHYLENE GLYCOL 3350 17 G: 17 POWDER, FOR SOLUTION ORAL at 09:15

## 2025-01-20 RX ADMIN — MAGNESIUM OXIDE TAB 400 MG (241.3 MG ELEMENTAL MG) 400 MG: 400 (241.3 MG) TAB at 09:16

## 2025-01-20 RX ADMIN — SIMETHICONE 80 MG: 80 TABLET, CHEWABLE ORAL at 09:16

## 2025-01-20 RX ADMIN — Medication 5 ML: at 06:12

## 2025-01-20 RX ADMIN — ACETAMINOPHEN 975 MG: 325 TABLET, FILM COATED ORAL at 14:03

## 2025-01-20 RX ADMIN — SIMETHICONE 80 MG: 80 TABLET, CHEWABLE ORAL at 12:01

## 2025-01-20 RX ADMIN — BISACODYL 10 MG: 10 SUPPOSITORY RECTAL at 09:17

## 2025-01-20 RX ADMIN — OXYCODONE HYDROCHLORIDE 5 MG: 5 TABLET ORAL at 12:01

## 2025-01-20 RX ADMIN — METHOCARBAMOL 750 MG: 750 TABLET ORAL at 14:03

## 2025-01-20 RX ADMIN — CALCIUM CARBONATE (ANTACID) CHEW TAB 500 MG 500 MG: 500 CHEW TAB at 05:52

## 2025-01-20 ASSESSMENT — ACTIVITIES OF DAILY LIVING (ADL)
ADLS_ACUITY_SCORE: 35
ADLS_ACUITY_SCORE: 36
ADLS_ACUITY_SCORE: 34
ADLS_ACUITY_SCORE: 36
ADLS_ACUITY_SCORE: 34
ADLS_ACUITY_SCORE: 34
ADLS_ACUITY_SCORE: 36
ADLS_ACUITY_SCORE: 34
ADLS_ACUITY_SCORE: 34
ADLS_ACUITY_SCORE: 35
ADLS_ACUITY_SCORE: 34
ADLS_ACUITY_SCORE: 36
ADLS_ACUITY_SCORE: 34

## 2025-01-20 NOTE — PROGRESS NOTES
Care Management Discharge Note    Discharge Date: 01/20/2025       Discharge Disposition: Shelter    Discharge Services: None    Discharge DME: None- PT Marcus Ku attempted several times to provide pt with a walker, however, pt declined and stated he did not need one.     Discharge Transportation: public transportation vs[facility]    Private pay costs discussed: Not applicable    Does the patient's insurance plan have a 3 day qualifying hospital stay waiver?  No    PAS Confirmation Code:  NA  Patient/family educated on Medicare website which has current facility and service quality ratings: yes    Education Provided on the Discharge Plan: Yes  Persons Notified of Discharge Plans: Patient  Patient/Family in Agreement with the Plan: yes    Handoff Referral Completed: No, handoff not indicated or clinically appropriate- Patient declines to be set up with a PCP.     Additional Information:  SW spoke with Surgery Resident, Dr. Whalen, who indicated that patient is medically ready for discharge.  Per weekend nursing notes, patient refused education on dressing changes.  SW and Provider, Dr. Whalen, met with patient at bedside to discuss discharge today.  Pt stating that he wants to agree to discharge today once a shelter bed has been secured.      @10am SW called [facility] and learned that, due to holiday, shelter hours are 1-9pm and SW can call back at 1pm to obtain shelter bed reservation.    @1315 SW called [facility] with patient at bedside for intake.  After 40 minutes, shelter bed was secured for 1600 at [facility] at [address] [place] [address].  Pt stated that he would not go to this location because it is dirty and he will sleep on a pad on the floor.  Pt requested that shelter worker find him housing at [facility] or [facility].  Pt informed that these are treatment centers, not shelters.  Pt requested to go to [facility] and was informed there  are no beds there.  SW requested that  [facility] patient.  SW then agreed to call [facility] on behalf of pt.    1400-  SW called [facility] treatment center.  Per Geovanna, pt is no longer a client at [facility] and could not discuss further without an MAGGY.      DAYNA updated Dr. Whalen, and it was decided that shelter placement is still a safe discharge option for patient.  Pt can choose to pursue [facility] once a bed opens there, as pt stating he would prefer to go to [facility] (was clinically accepted but no beds currently).  Bedside and charge nurses updated. DAYNA requested assistance from [facility] Fabienne to obtain bus card and gift card resources for patient in addition to arranging transportation.  Per PT Marcus, pt refused a walker to discharge with.    1445- SW and Dr. Whalen informed patient of discharge plan to [facility] for 1600 intake.  Pt stating he will not go and security will need to remove him.  Pt then cooperated with nursing staff to prepare for discharge.     1530- SW saw pt off the floor with NST.  Per NST patient did get in cab but then got out of cab, claiming that the shelter was closed and he would not go there. Pt then left the hospital.     __________________________     RADHA Larson, CRISTAL  , [facility]  7C Medical Surgical Beds 5332-5163   Desk Phone: [phone]   Securely message with [employer] (Search Name or 7C Med Surg 2713-3975 )  Text page via [employer] Paging/Directory   See signed in provider for up to date coverage information  Social Work & Care Management Department  ___________________________________    Unit 7C Care Management Weekend Contacts:    Searchable in [employer] - search SOCIAL WORK or CARE COORDINATOR  Searchable in [employer] - search 7C Med Surg SW, 7C Med Surg RNCC     7C Weekend SW Vocera; ([phone])  7C Weekend RNCC Vocera; (2035-1010)  After hours  Vocera;  (Weekends  (1630 - 0000); Mon-Fri (2792-7022); [facility] Recognized Holidays  (1152-7387))

## 2025-01-20 NOTE — PROGRESS NOTES
Care Management Follow Up    Length of Stay (days): 31    Expected Discharge Date: 01/20/2025     Concerns to be Addressed: discharge planning     Patient plan of care discussed at interdisciplinary rounds: Yes    Anticipated Discharge Disposition: Shelter  Anticipated Discharge Services: None  Anticipated Discharge DME: None    Patient/family educated on Medicare website which has current facility and service quality ratings: yes  Education Provided on the Discharge Plan: Yes  Patient/Family in Agreement with the Plan: yes    Referrals Placed by CM/SW: [facility]  Private pay costs discussed: Not applicable    Discussed  Partnership in Safe Discharge Planning  document with patient/family: Yes    Additional Information:  CHW scheduled a [employer] cab ride [facility] the [facility] ([facility] [place] [address]) today at 3:30 PM. Ride rescheduled to 3:50 PM.       Fabienne Melchor  Inpatient CHW  [facility] Unit 7C  [phone]

## 2025-01-20 NOTE — PLAN OF CARE
PT 7C: attempted to issue FWW for discharge, pt denies needing a walker x 3 (despite ambulating with the use of a walker while discussing). RN attempted as well and pt continues to refuse needing or desiring a walker for discharge. Will not issue.

## 2025-01-20 NOTE — PROGRESS NOTES
Surgery Progress Note  01/20/2025       Subjective:  - Patient awake at bedside, feeling grateful today, tolerating PO without NVBH, passing BM/gas, pain well controlled, ambulatory.     Objective:  Temp:  [97.6  F (36.4  C)-98.4  F (36.9  C)] 98.4  F (36.9  C)  Pulse:  [84-95] 95  Resp:  [16] 16  BP: (116-119)/(73-84) 119/84  SpO2:  [97 %-99 %] 99 %    I/O last 3 completed shifts:  In: 1027 [P.O.:1020; I.V.:7]  Out: 950 [Urine:950]      Gen: Awake, alert, NAD  Resp: NLB on RA  Abd: soft, appropriately tender, mildly distended, midline incision dressing removed and changed  Incision: No purulent drainage, non-erythematous, with fibrinous tissue and some serous drainage. Dressing was replaced with dry kerlix & ABD pad, continues to improve  Ext: WWP, no edema     Labs:  Recent Labs   Lab 01/20/25  0202 01/17/25  0709 01/14/25  0604 01/13/25  2351 01/13/25  1332 01/13/25  1235   WBC  --   --  10.9  --  11.3* 13.0*   HGB  --   --  7.7*  --  7.8* 5.2*    537* 624*   < > 714* 786*    < > = values in this interval not displayed.       Recent Labs   Lab 01/20/25  0727 01/20/25  0618 01/20/25  0202 01/19/25  0817 01/19/25  0621 01/18/25  0917 01/18/25  0705 01/17/25  0827 01/17/25  0709 01/15/25  0921 01/15/25  0550   NA  --  132*  --   --   --   --   --   --  133*  --  135   POTASSIUM  --  4.1  --   --  4.2  --  3.9  --  4.3   < > 3.9   CHLORIDE  --  99  --   --   --   --   --   --  101  --  101   CO2  --  25  --   --   --   --   --   --  24  --  24   BUN  --  17.5  --   --   --   --   --   --  18.4  --  20.0   CR  --  0.70  --   --   --   --   --   --  0.72  --  0.70   * 118* 130*   < >  --    < >  --    < > 150*   < > 145*   LINDA  --  8.8  --   --   --   --   --   --  8.7*  --  8.6*   MAG  --  2.0  --   --  2.0  --  2.0  --  2.0  --  1.8   PHOS  --  3.7  --   --  3.3  --  4.4  --  3.4   < > 4.3    < > = values in this interval not displayed.       Imaging:  No new imaging within 24 hours.    "  Assessment/Plan:   59 year old male with PMHx T2DM, HIV on ART, and recent admission from 12/17-12/19 for SBO managed successfully with bowel rest/GGC who presented to ED again on 12/20 for recurrent SBO s/p ex lap/SBR on 12/20 and subsequently s/p ex-lap/SBR/re-anastomosis for anastomotic leak on 12/24. Extubated 12/26 AM. Currently floor status with antegrade bowel function.      POD# 31: Medically ready for discharge. Continue discharge planning. Addiction medicine SW to help with placement.     - DME coordination with patient care coordinator for discharge  - Patient education on how to change dressing  - Scheduled suppository and PRN soap suds enema.  - Continue multimodal pain control   - Moderate Protein-Calorie Malnutrition: Continue regular diet and Boost supplements.  - Continue Biktarvy  - Dry to dry dressing changes BID-- changing to dry kerlix with ABD pad since wound is more moist now.  - Replete electrolytes PRN  - Bowel Regimen: Miralax BID, Senna BID, Milk of Mag BID, Dulcolax suppository PRN  - Endocrinology: Appreciate IP management and discharge recommendations  - Ambulate QID: Goal to be off 1:1 assist  - WOCN following, last seen 1/13.   - Ongoing discussions: WOC VAC vs dressing changes vs Medical respit for discharge.  - Dispo: Appreciate SW help. Discharging Today     Seen, examined, and discussed with chief resident, who will discuss with staff.  - - - - - - - - - - - - - - - - - -  Koby Garrido MD  General Surgery, PGY-1  Emergency General Surgery    General surgery is primary. Do not look for \"signed in\" providers, please search \"General Surgery [facility]\" in [facility] and use listings as detailed below:      Daytime  First call: \"General surgery intern inpatients\"  Second call: \"General surgery resident consult\"  Third call: \"Chief resident general surgery\"     Nights  First call: \"General intern inpatients nights/wknd/hol\"  Second call: \"General surgery resident consult\"  Third call: Staff " surgeon

## 2025-01-20 NOTE — PLAN OF CARE
Goal Outcome Evaluation:      Plan of Care Reviewed With: patient    Overall Patient Progress: improvingOverall Patient Progress: improving    Outcome Evaluation: Writer assumed care of pt from 1400-7268. A/Ox4, RA, VSS. Pt was in very pleasant mood this shift. Took a shower at change of shift and asked to have their abd dressing changed. Writer changed dressing and did cap change on PICC line. Pt had scheduled medications along with PRN oxy to control pain. No acute events occurred this shift. Please continue with POC.

## 2025-01-20 NOTE — PLAN OF CARE
Goal Outcome Evaluation:      Plan of Care Reviewed With: patient    Overall Patient Progress: improving         Discharge to: [facility]  Transportation:  [facility]  Time: 1530  Prescriptions: sent with paper prescriptions  Belongings: pt sent with all belongings and wound care supplies.  Gift cards, cab funds, and hat/mittens provided.     -if applicable return home meds from inpatient pharmacy:  Access: PICC line removed  Care plan and education discontinued: done  Paperwork:  All paperwork discussed and given.  No further questions or concerns.

## 2025-01-20 NOTE — PLAN OF CARE
"/73 (BP Location: Left arm)   Pulse 102   Temp 97.8  F (36.6  C) (Oral)   Resp 15   Ht 1.626 m (5' 4\")   Wt 66.5 kg (146 lb 9.7 oz)   SpO2 96%   BMI 25.16 kg/m    PICC is C/D/I and HL. Prior discharge to remove the PICC. Patient continue to c/o abdominal pain and received oxycodone x 1 with some relief. Patient is eating and drinking. Blood glucose checked with sliding scale correction + CHO. Abdominal wound care is done with the dressing changed. Patient did not want to follow up with the dressing change. Patient is using walker in the hospital but when OT ordered the walker, he refused. Possible discharge to homeless shelter at 1600 but patient is refusing to go to that homeless shelter. Patient refused to get clean up and do his CHG wipes. Continue with plan of care and notify MD for status changes.     Problem: Adult Inpatient Plan of Care  Goal: Plan of Care Review  Description: The Plan of Care Review/Shift note should be completed every shift.  The Outcome Evaluation is a brief statement about your assessment that the patient is improving, declining, or no change.  This information will be displayed automatically on your shift  note.  Outcome: Progressing  Flowsheets (Taken 1/20/2025 1433)  Plan of Care Reviewed With: patient  Overall Patient Progress: no change     Problem: Adult Inpatient Plan of Care  Goal: Absence of Hospital-Acquired Illness or Injury  Outcome: Progressing     Problem: Adult Inpatient Plan of Care  Goal: Absence of Hospital-Acquired Illness or Injury  Intervention: Identify and Manage Fall Risk  Recent Flowsheet Documentation  Taken 1/20/2025 1201 by Flor Jackson RN  Safety Promotion/Fall Prevention:   increased rounding and observation   clutter free environment maintained   increase visualization of patient   lighting adjusted     Problem: Adult Inpatient Plan of Care  Goal: Absence of Hospital-Acquired Illness or Injury  Intervention: Prevent Skin " Injury  Recent Flowsheet Documentation  Taken 1/20/2025 1201 by Flor Jackson RN  Body Position: position changed independently     Problem: Adult Inpatient Plan of Care  Goal: Absence of Hospital-Acquired Illness or Injury  Intervention: Prevent Infection  Recent Flowsheet Documentation  Taken 1/20/2025 1201 by Flor Jackson RN  Infection Prevention:   environmental surveillance performed   equipment surfaces disinfected   hand hygiene promoted   personal protective equipment utilized     Problem: Adult Inpatient Plan of Care  Goal: Optimal Comfort and Wellbeing  Outcome: Progressing     Problem: Adult Inpatient Plan of Care  Goal: Optimal Comfort and Wellbeing  Intervention: Monitor Pain and Promote Comfort  Recent Flowsheet Documentation  Taken 1/20/2025 1201 by Flor Jackson RN  Pain Management Interventions:   medication (see MAR)   MD notified (comment)   Provider notified (comment)     Problem: Adult Inpatient Plan of Care  Goal: Optimal Comfort and Wellbeing  Intervention: Provide Person-Centered Care  Recent Flowsheet Documentation  Taken 1/20/2025 1201 by Flor Jackson RN  Trust Relationship/Rapport:   care explained   choices provided   emotional support provided   empathic listening provided   questions answered   questions encouraged   reassurance provided   thoughts/feelings acknowledged     Problem: Adult Inpatient Plan of Care  Goal: Readiness for Transition of Care  Outcome: Progressing     Problem: Pain Acute  Goal: Optimal Pain Control and Function  Outcome: Progressing     Problem: Pain Acute  Goal: Optimal Pain Control and Function  Intervention: Develop Pain Management Plan  Recent Flowsheet Documentation  Taken 1/20/2025 1201 by Flor Jackson RN  Pain Management Interventions:   medication (see MAR)   MD notified (comment)   Provider notified (comment)     Problem: Pain Acute  Goal: Optimal Pain Control and Function  Intervention: Prevent or  Manage Pain  Recent Flowsheet Documentation  Taken 1/20/2025 1201 by Flor Jackson RN  Sensory Stimulation Regulation: quiet environment promoted  Sleep/Rest Enhancement: relaxation techniques promoted  Medication Review/Management: medications reviewed     Problem: Intestinal Obstruction  Goal: Optimal Bowel Function  Outcome: Progressing     Problem: Intestinal Obstruction  Goal: Optimal Bowel Function  Intervention: Promote Bowel Function  Recent Flowsheet Documentation  Taken 1/20/2025 1201 by Flor Jackson RN  Body Position: position changed independently     Problem: Intestinal Obstruction  Goal: Fluid and Electrolyte Balance  Outcome: Progressing     Problem: Intestinal Obstruction  Goal: Fluid and Electrolyte Balance  Intervention: Monitor and Manage Hypovolemia  Recent Flowsheet Documentation  Taken 1/20/2025 1201 by Flor Jackson RN  Fluid/Electrolyte Management: fluids provided     Problem: Intestinal Obstruction  Goal: Absence of Infection Signs and Symptoms  Intervention: Prevent or Manage Infection  Recent Flowsheet Documentation  Taken 1/20/2025 1201 by Flor Jackson RN  Infection Management: aseptic technique maintained  Isolation Precautions: contact precautions maintained     Problem: Intestinal Obstruction  Goal: Optimal Pain Control and Function  Intervention: Prevent or Manage Pain  Recent Flowsheet Documentation  Taken 1/20/2025 1201 by Flor Jackson RN  Pain Management Interventions:   medication (see MAR)   MD notified (comment)   Provider notified (comment)  Sleep/Rest Enhancement: relaxation techniques promoted     Problem: Comorbidity Management  Goal: Blood Glucose Levels Within Targeted Range  Intervention: Monitor and Manage Glycemia  Recent Flowsheet Documentation  Taken 1/20/2025 1201 by Flor Jackson RN  Medication Review/Management: medications reviewed     Problem: Restraint, Nonviolent  Goal: Absence of Harm or  Injury  Intervention: Protect Dignity, Rights and Personal Wellbeing  Recent Flowsheet Documentation  Taken 1/20/2025 1201 by Flor Jackson RN  Trust Relationship/Rapport:   care explained   choices provided   emotional support provided   empathic listening provided   questions answered   questions encouraged   reassurance provided   thoughts/feelings acknowledged     Problem: Restraint, Nonviolent  Goal: Absence of Harm or Injury  Intervention: Protect Skin and Joint Integrity  Recent Flowsheet Documentation  Taken 1/20/2025 1201 by Flor Jackson RN  Body Position: position changed independently    Goal Outcome Evaluation:      Plan of Care Reviewed With: patient    Overall Patient Progress: no changeOverall Patient Progress: no change

## 2025-01-21 ENCOUNTER — PATIENT OUTREACH (OUTPATIENT)
Dept: SURGERY | Facility: CLINIC | Age: 60
End: 2025-01-21

## 2025-01-21 ENCOUNTER — HOSPITAL ENCOUNTER (EMERGENCY)
Facility: CLINIC | Age: 60
Discharge: HOME | End: 2025-01-22
Attending: FAMILY MEDICINE | Admitting: FAMILY MEDICINE
Payer: COMMERCIAL

## 2025-01-21 DIAGNOSIS — Z21 HUMAN IMMUNODEFICIENCY VIRUS I INFECTION (H): ICD-10-CM

## 2025-01-21 DIAGNOSIS — Z59.00 HOMELESSNESS: ICD-10-CM

## 2025-01-21 DIAGNOSIS — L08.9 CHRONIC ABDOMINAL WOUND INFECTION, SEQUELA: ICD-10-CM

## 2025-01-21 DIAGNOSIS — E11.65 TYPE 2 DIABETES MELLITUS WITH HYPERGLYCEMIA, WITH LONG-TERM CURRENT USE OF INSULIN (H): ICD-10-CM

## 2025-01-21 DIAGNOSIS — S31.109S CHRONIC ABDOMINAL WOUND INFECTION, SEQUELA: ICD-10-CM

## 2025-01-21 DIAGNOSIS — K91.89 SMALL BOWEL ANASTOMOTIC LEAK: ICD-10-CM

## 2025-01-21 DIAGNOSIS — E11.69 TYPE 2 DIABETES MELLITUS WITH OTHER SPECIFIED COMPLICATION, WITH LONG-TERM CURRENT USE OF INSULIN (H): ICD-10-CM

## 2025-01-21 DIAGNOSIS — Z79.4 TYPE 2 DIABETES MELLITUS WITH OTHER SPECIFIED COMPLICATION, WITH LONG-TERM CURRENT USE OF INSULIN (H): ICD-10-CM

## 2025-01-21 DIAGNOSIS — Z79.4 TYPE 2 DIABETES MELLITUS WITH HYPERGLYCEMIA, WITH LONG-TERM CURRENT USE OF INSULIN (H): ICD-10-CM

## 2025-01-21 LAB
ALBUMIN SERPL BCG-MCNC: 3.1 G/DL (ref 3.5–5.2)
ALP SERPL-CCNC: 364 U/L (ref 40–150)
ALT SERPL W P-5'-P-CCNC: 24 U/L (ref 0–70)
ANION GAP SERPL CALCULATED.3IONS-SCNC: 10 MMOL/L (ref 7–15)
AST SERPL W P-5'-P-CCNC: 29 U/L (ref 0–45)
BACTERIA PRT CULT: ABNORMAL
BASOPHILS # BLD AUTO: 0.1 10E3/UL (ref 0–0.2)
BASOPHILS NFR BLD AUTO: 1 %
BILIRUB SERPL-MCNC: 0.5 MG/DL
BUN SERPL-MCNC: 15.8 MG/DL (ref 8–23)
CALCIUM SERPL-MCNC: 9.2 MG/DL (ref 8.8–10.4)
CHLORIDE SERPL-SCNC: 96 MMOL/L (ref 98–107)
CREAT SERPL-MCNC: 0.72 MG/DL (ref 0.67–1.17)
EGFRCR SERPLBLD CKD-EPI 2021: >90 ML/MIN/1.73M2
EOSINOPHIL # BLD AUTO: 0.1 10E3/UL (ref 0–0.7)
EOSINOPHIL NFR BLD AUTO: 1 %
ERYTHROCYTE [DISTWIDTH] IN BLOOD BY AUTOMATED COUNT: 14 % (ref 10–15)
EST. AVERAGE GLUCOSE BLD GHB EST-MCNC: 240 MG/DL
GLUCOSE BLDC GLUCOMTR-MCNC: 105 MG/DL (ref 70–99)
GLUCOSE BLDC GLUCOMTR-MCNC: 159 MG/DL (ref 70–99)
GLUCOSE BLDC GLUCOMTR-MCNC: 176 MG/DL (ref 70–99)
GLUCOSE SERPL-MCNC: 104 MG/DL (ref 70–99)
HBA1C MFR BLD: 10 %
HCO3 SERPL-SCNC: 26 MMOL/L (ref 22–29)
HCT VFR BLD AUTO: 26.4 % (ref 40–53)
HGB BLD-MCNC: 8.5 G/DL (ref 13.3–17.7)
IMM GRANULOCYTES # BLD: 0.1 10E3/UL
IMM GRANULOCYTES NFR BLD: 1 %
LYMPHOCYTES # BLD AUTO: 1.8 10E3/UL (ref 0.8–5.3)
LYMPHOCYTES NFR BLD AUTO: 19 %
MAGNESIUM SERPL-MCNC: 2 MG/DL (ref 1.7–2.3)
MCH RBC QN AUTO: 26.6 PG (ref 26.5–33)
MCHC RBC AUTO-ENTMCNC: 32.2 G/DL (ref 31.5–36.5)
MCV RBC AUTO: 83 FL (ref 78–100)
MONOCYTES # BLD AUTO: 1 10E3/UL (ref 0–1.3)
MONOCYTES NFR BLD AUTO: 10 %
NEUTROPHILS # BLD AUTO: 6.7 10E3/UL (ref 1.6–8.3)
NEUTROPHILS NFR BLD AUTO: 69 %
NRBC # BLD AUTO: 0 10E3/UL
NRBC BLD AUTO-RTO: 0 /100
PHOSPHATE SERPL-MCNC: 3.4 MG/DL (ref 2.5–4.5)
PLATELET # BLD AUTO: 475 10E3/UL (ref 150–450)
POTASSIUM SERPL-SCNC: 3.4 MMOL/L (ref 3.4–5.3)
PROT SERPL-MCNC: 8.6 G/DL (ref 6.4–8.3)
RBC # BLD AUTO: 3.2 10E6/UL (ref 4.4–5.9)
SODIUM SERPL-SCNC: 132 MMOL/L (ref 135–145)
WBC # BLD AUTO: 9.8 10E3/UL (ref 4–11)

## 2025-01-21 PROCEDURE — 82962 GLUCOSE BLOOD TEST: CPT

## 2025-01-21 PROCEDURE — 99284 EMERGENCY DEPT VISIT MOD MDM: CPT | Performed by: FAMILY MEDICINE

## 2025-01-21 PROCEDURE — 250N000013 HC RX MED GY IP 250 OP 250 PS 637: Performed by: FAMILY MEDICINE

## 2025-01-21 PROCEDURE — 80053 COMPREHEN METABOLIC PANEL: CPT | Performed by: FAMILY MEDICINE

## 2025-01-21 PROCEDURE — 85004 AUTOMATED DIFF WBC COUNT: CPT | Performed by: FAMILY MEDICINE

## 2025-01-21 PROCEDURE — 250N000012 HC RX MED GY IP 250 OP 636 PS 637: Performed by: FAMILY MEDICINE

## 2025-01-21 PROCEDURE — 83735 ASSAY OF MAGNESIUM: CPT | Performed by: FAMILY MEDICINE

## 2025-01-21 PROCEDURE — 84100 ASSAY OF PHOSPHORUS: CPT | Performed by: FAMILY MEDICINE

## 2025-01-21 PROCEDURE — G0463 HOSPITAL OUTPT CLINIC VISIT: HCPCS

## 2025-01-21 PROCEDURE — 36415 COLL VENOUS BLD VENIPUNCTURE: CPT

## 2025-01-21 PROCEDURE — 83036 HEMOGLOBIN GLYCOSYLATED A1C: CPT | Performed by: FAMILY MEDICINE

## 2025-01-21 RX ORDER — ALBUTEROL SULFATE 90 UG/1
2 INHALANT RESPIRATORY (INHALATION) EVERY 6 HOURS PRN
Status: DISCONTINUED | OUTPATIENT
Start: 2025-01-21 | End: 2025-01-22 | Stop reason: HOSPADM

## 2025-01-21 RX ORDER — NAPROXEN 500 MG/1
500 TABLET ORAL 2 TIMES DAILY PRN
Status: DISCONTINUED | OUTPATIENT
Start: 2025-01-21 | End: 2025-01-22 | Stop reason: HOSPADM

## 2025-01-21 RX ORDER — NICOTINE POLACRILEX 4 MG
15-30 LOZENGE BUCCAL
Status: DISCONTINUED | OUTPATIENT
Start: 2025-01-21 | End: 2025-01-22 | Stop reason: HOSPADM

## 2025-01-21 RX ORDER — PANTOPRAZOLE SODIUM 40 MG/1
40 TABLET, DELAYED RELEASE ORAL
Status: DISCONTINUED | OUTPATIENT
Start: 2025-01-22 | End: 2025-01-22 | Stop reason: HOSPADM

## 2025-01-21 RX ORDER — DEXTROSE MONOHYDRATE 25 G/50ML
25-50 INJECTION, SOLUTION INTRAVENOUS
Status: DISCONTINUED | OUTPATIENT
Start: 2025-01-21 | End: 2025-01-22 | Stop reason: HOSPADM

## 2025-01-21 RX ORDER — NICOTINE POLACRILEX 4 MG
15-30 LOZENGE BUCCAL
Status: DISCONTINUED | OUTPATIENT
Start: 2025-01-21 | End: 2025-01-22

## 2025-01-21 RX ORDER — MAGNESIUM HYDROXIDE/ALUMINUM HYDROXICE/SIMETHICONE 120; 1200; 1200 MG/30ML; MG/30ML; MG/30ML
15 SUSPENSION ORAL ONCE
Status: COMPLETED | OUTPATIENT
Start: 2025-01-21 | End: 2025-01-21

## 2025-01-21 RX ORDER — DEXTROSE MONOHYDRATE 25 G/50ML
25-50 INJECTION, SOLUTION INTRAVENOUS
Status: DISCONTINUED | OUTPATIENT
Start: 2025-01-21 | End: 2025-01-22

## 2025-01-21 RX ORDER — CALCIUM CARBONATE 500 MG/1
500 TABLET, CHEWABLE ORAL 2 TIMES DAILY PRN
Status: DISCONTINUED | OUTPATIENT
Start: 2025-01-21 | End: 2025-01-22 | Stop reason: HOSPADM

## 2025-01-21 RX ADMIN — INSULIN ASPART 1 UNITS: 100 INJECTION, SOLUTION INTRAVENOUS; SUBCUTANEOUS at 18:25

## 2025-01-21 RX ADMIN — NAPROXEN 500 MG: 500 TABLET ORAL at 19:19

## 2025-01-21 RX ADMIN — ALUMINUM HYDROXIDE, MAGNESIUM HYDROXIDE, AND SIMETHICONE 15 ML: 200; 200; 20 SUSPENSION ORAL at 12:02

## 2025-01-21 RX ADMIN — BICTEGRAVIR SODIUM, EMTRICITABINE, AND TENOFOVIR ALAFENAMIDE FUMARATE 1 TABLET: 50; 200; 25 TABLET ORAL at 19:48

## 2025-01-21 SDOH — ECONOMIC STABILITY - HOUSING INSECURITY: HOMELESSNESS UNSPECIFIED: Z59.00

## 2025-01-21 ASSESSMENT — ACTIVITIES OF DAILY LIVING (ADL)
ADLS_ACUITY_SCORE: 58
ADLS_ACUITY_SCORE: 59
ADLS_ACUITY_SCORE: 58
ADLS_ACUITY_SCORE: 59

## 2025-01-21 NOTE — ED PROVIDER NOTES
ED Provider Note  [facility]      History     Chief Complaint   Patient presents with    Generalized Weakness     Open wound to abd.  Surgery in Dec, 2024.  Large open abd pain.  NIDDM   in rig.   Called 911 because he could not make it to bus stop.  Pt is homeless.   Sores on face and arms.     The history is provided by the patient and medical records.     Andrew Collier is a 59 year old male with a history of DM II, HIV on ART, SBO s/p exploratory laparotomy with small bowel resection c/b re-anastomosis (12/24/2024) who presents to the ED via EMS for evaluation of weakness.      Patient was discharged from [facility] general surgery service after a 31-day admission following his exploratory laparotomy.  Patient states he was discharged to a friend's house last night as he does not currently have permanent housing.  Patient woke up feeling weak and recorded a blood glucose value of 85.  Patient reports using Lantus and NovoLog units as prescribed.  Patient states he last took Lantus at approximately 3:30 PM yesterday prior to discharge. He took sugar tablets this morning prior to arrival in the ED.   Patient reports some discomfort around his abdominal wound but he denies diffuse abdominal pain.  Patient states he has had increase in flatulence.  He did pass a bowel movement at 4 AM this morning.  He denies urinary symptoms including dysuria, hematuria or frequency.  He denies recent fevers.    Per chart review, patient was admitted to [facility] from 12/20 - 1/20/2025.  Patient underwent exploratory laparotomy on 12/20 with postoperative course complicated by poor pain control and somnolence postop day 3.  Postop day 4 patient was taken to the OR and intubated for concerns of septic encephalopathy.  He then underwent repeat exploratory laparotomy with small bowel resection and reanastomosis.  CT A/P on 12/30 showed no signs of continued abscess and wound VAC was placed with removal  "on 1/6.  Discharge was noted to be significantly prolonged due to difficulty finding placement \"due to global facility decline.\"  Patient education was attempted multiple times to ensure proper dressing change, however patient declined each attempt.  Placement was found on postop day 28 through medical respite, however beds were full.  Postop day 31 [facility] offered shelter transfer but patient is agreed with placement.  Decision was made to continue with discharge due to meeting operative milestones.  Patient was tolerating regular diet with control of pain with oral medications.  Patient is scheduled to follow-up with Dr. Hernandez in clinic on 1/30/2025.    Past Medical History  Past Medical History:   Diagnosis Date    Acute appendicitis with localized peritonitis 01/31/2018    AIDS (H)     Allergic rhinitis due to other allergen     DNS    Anal dysplasia     Chronic abdominal pain     CNS toxoplasmosis (H)     COVID-19 01/11/2022    Diabetes type 2, controlled (H)     GERD (gastroesophageal reflux disease)     Herpes zoster 09/23/2016    History of seizure     History of substance abuse (H)     HIV (human immunodeficiency virus infection) (H)     HLD (hyperlipidemia)     Lung nodules     Periungual wart     Pneumonia 01/06/2019    PTSD (post-traumatic stress disorder)     Sleep apnea     doesn't use CPAP     Past Surgical History:   Procedure Laterality Date    ANOSCOPY N/A 09/09/2020    Procedure: Exam Under Anesthesia, ANOSCOPY, fulgeration of rectal fissures with Rectal Biopsies;  Surgeon: Thanh Lundberg MD;  Location: [facility]    COLONOSCOPY Left 01/22/2016    Procedure: COMBINED COLONOSCOPY, SINGLE OR MULTIPLE BIOPSY/POLYPECTOMY BY BIOPSY;  Surgeon: Clark Saini MD;  Location: [facility] GI    ESOPHAGOSCOPY, GASTROSCOPY, DUODENOSCOPY (EGD), COMBINED N/A 06/06/2022    Procedure: ESOPHAGOGASTRODUODENOSCOPY, WITH BIOPSY;  Surgeon: Kecia Benítez MD;  [facility]    ESOPHAGOSCOPY, GASTROSCOPY, " DUODENOSCOPY (EGD), COMBINED N/A 10/10/2024    Procedure: Esophagoscopy, gastroscopy, duodenoscopy (EGD), combined;  Surgeon: Nico Bhatti DO;  Location: [facility]    HC EXPLORE UNDESC TESTIS,INGUIN/SCROTAL      LAPAROSCOPIC APPENDECTOMY N/A 01/31/2018    Procedure: LAPAROSCOPIC APPENDECTOMY;  LAPAROSCOPIC APPENDECTOMY;  Surgeon: Dawn Holt MD;  [facility]    LAPAROSCOPY DIAGNOSTIC (GENERAL) N/A 07/26/2016    Procedure: LAPAROSCOPY DIAGNOSTIC (GENERAL);  Surgeon: Susannah Arriaga MD;  [facility]    LAPAROSCOPY DIAGNOSTIC (GENERAL) N/A 04/16/2018    Procedure: LAPAROSCOPY DIAGNOSTIC (GENERAL);  Diagnostic laparoscopy and lysis of adhesions;  Surgeon: Prince Dowling MD;  [facility]: [facility]    OPTICAL TRACKING SYSTEM CRANIOTOMY, EXCISE TUMOR, COMBINED Left 04/10/2015    Procedure: COMBINED OPTICAL TRACKING SYSTEM CRANIOTOMY, EXCISE TUMOR;  Surgeon: Mirlande Colmenares MD;  [facility]: [facility]    PICC INSERTION - TRIPLE LUMEN Right 12/26/2024    Right brachial medial vein 42cm total 4cm external.    REPAIR GAMEKEEPER'S THUMB Right 12/02/2016    Procedure: REPAIR LIGAMENT ULNAR COLLATERAL THUMB (GAMEKEEPER'S);  Surgeon: Evin Zamorano MD;  [facility]    RESECT SMALL BOWEL WITHOUT OSTOMY N/A 12/20/2024    Procedure: Laparotomy exploratory with small bowel resection.;  Surgeon: Christiano Obrien MD;  [facility]    RESECT SMALL BOWEL WITHOUT OSTOMY N/A 12/24/2024    Procedure: EXPLORATORY LAPAROTOMY, SMALL BOWEL RESECTION AND REANASTOMOSIS;  Surgeon: Shabnam Kate MD;  Location: [facility]    ZZC NONSPECIFIC PROCEDURE      right forearm fracture     acetaminophen (TYLENOL) 325 MG tablet  albuterol (PROAIR HFA/PROVENTIL HFA/VENTOLIN HFA) 108 (90 Base) MCG/ACT inhaler  albuterol (PROAIR HFA/PROVENTIL HFA/VENTOLIN HFA) 108 (90 Base) MCG/ACT inhaler  bictegravir-emtricitabine-tenofovir (BIKTARVY) -25 MG per tablet  famotidine (PEPCID) 40 MG tablet  insulin aspart  "(NOVOLOG PEN) 100 UNIT/ML pen  insulin pen needle (32G X 4 MM) 32G X 4 MM miscellaneous  methocarbamol (ROBAXIN) 750 MG tablet  naproxen (NAPROSYN) 500 MG tablet  oxyCODONE (ROXICODONE) 5 MG tablet  pantoprazole (PROTONIX) 40 MG EC tablet  polymixin b-trimethoprim (POLYTRIM) 54647-4.1 UNIT/ML-% ophthalmic solution  blood glucose (NO BRAND SPECIFIED) lancets standard  blood glucose (NO BRAND SPECIFIED) test strip  blood glucose monitoring (NO BRAND SPECIFIED) meter device kit  Continuous Glucose Sensor (DEXCOM G6 SENSOR) MISC  Continuous Glucose Sensor (DEXCOM G7 SENSOR) MISC  Continuous Glucose Transmitter (DEXCOM G6 TRANSMITTER) MISC  insulin glargine (LANTUS PEN) 100 UNIT/ML pen  Nutritional Supplements (ENSURE ACTIVE) LIQD      Allergies   Allergen Reactions    Fentanyl Blisters     Per pt, after taking this medication had blisters develope    Tylenol [Acetaminophen] Itching     12/27/24 Patient has received acetaminophen multiple times since documentation of this reaction, in 2016 patient was given acetaminophen with codeine - more likely that codeine would cause itching    Dulaglutide Rash    Insulin Lispro Rash     Patient reported    Penicillin V Other (See Comments) and Rash     Diffuse maculopapular rash + feels \"high\", per pt.     Profile shows many uses of Zosyn      Family History  Family History   Problem Relation Age of Onset    Diabetes Brother     Diabetes Father     Alzheimer Disease Father     Unknown/Adopted Mother     Diabetes Paternal Grandfather     Cancer No family hx of         no skin cancer    Skin Cancer No family hx of         no famiy hx of skin cancer    Glaucoma No family hx of     Macular Degeneration No family hx of      Social History   Social History     Tobacco Use    Smoking status: Some Days     Current packs/day: 0.25     Average packs/day: 0.3 packs/day for 40.0 years (10.0 ttl pk-yrs)     Types: Cigarettes    Smokeless tobacco: Former   Substance Use Topics    Alcohol use: No " "    Alcohol/week: 0.0 standard drinks of alcohol     Comment: Last etoh in 2007    Drug use: Not Currently     Types: Marijuana     Comment: chart indicated meth use but pt denies; states he does not use drugs      Past medical history, past surgical history, medications, allergies, family history, and social history were reviewed with the patient. No additional pertinent items.   A medically appropriate review of systems was performed with pertinent positives and negatives noted in the HPI, and all other systems negative.    Physical Exam   BP: 131/88  Pulse: 100  Temp: 97.5  F (36.4  C)  Resp: 20  Height: 162.6 cm (5' 4\")  SpO2: 99 %  Physical Exam  Vitals and nursing note reviewed.   Constitutional:       General: He is not in acute distress.     Appearance: Normal appearance. He is not toxic-appearing.   HENT:      Head: Atraumatic.   Eyes:      General: No scleral icterus.     Conjunctiva/sclera: Conjunctivae normal.   Cardiovascular:      Rate and Rhythm: Normal rate.      Heart sounds: Normal heart sounds.   Pulmonary:      Effort: Pulmonary effort is normal. No respiratory distress.      Breath sounds: Normal breath sounds.   Abdominal:      Palpations: Abdomen is soft.      Tenderness: There is no abdominal tenderness.       Musculoskeletal:         General: No deformity.      Cervical back: Neck supple.   Skin:     General: Skin is warm.   Neurological:      Mental Status: He is alert.           ED Course, Procedures, & Data      Procedures                Results for orders placed or performed during the hospital encounter of 01/21/25   Glucose by meter     Status: Abnormal   Result Value Ref Range    GLUCOSE BY METER POCT 105 (H) 70 - 99 mg/dL   CBC with platelets and differential     Status: Abnormal   Result Value Ref Range    WBC Count 9.8 4.0 - 11.0 10e3/uL    RBC Count 3.20 (L) 4.40 - 5.90 10e6/uL    Hemoglobin 8.5 (L) 13.3 - 17.7 g/dL    Hematocrit 26.4 (L) 40.0 - 53.0 %    MCV 83 78 - 100 fL    MCH " 26.6 26.5 - 33.0 pg    MCHC 32.2 31.5 - 36.5 g/dL    RDW 14.0 10.0 - 15.0 %    Platelet Count 475 (H) 150 - 450 10e3/uL    % Neutrophils 69 %    % Lymphocytes 19 %    % Monocytes 10 %    % Eosinophils 1 %    % Basophils 1 %    % Immature Granulocytes 1 %    NRBCs per 100 WBC 0 <1 /100    Absolute Neutrophils 6.7 1.6 - 8.3 10e3/uL    Absolute Lymphocytes 1.8 0.8 - 5.3 10e3/uL    Absolute Monocytes 1.0 0.0 - 1.3 10e3/uL    Absolute Eosinophils 0.1 0.0 - 0.7 10e3/uL    Absolute Basophils 0.1 0.0 - 0.2 10e3/uL    Absolute Immature Granulocytes 0.1 <=0.4 10e3/uL    Absolute NRBCs 0.0 10e3/uL   CBC with platelets differential     Status: Abnormal    Narrative    The following orders were created for panel order CBC with platelets differential.  Procedure                               Abnormality         Status                     ---------                               -----------         ------                     CBC with platelets and d...[[id number]]  Abnormal            Final result                 Please view results for these tests on the individual orders.     Medications   glucose gel 15-30 g (has no administration in time range)     Or   dextrose 50 % injection 25-50 mL (has no administration in time range)     Or   glucagon injection 1 mg (has no administration in time range)   alum & mag hydroxide-simethicone (MAALOX) suspension 15 mL (15 mLs Oral $Given 1/21/25 1202)     Labs Ordered and Resulted from Time of ED Arrival to Time of ED Departure   GLUCOSE BY METER - Abnormal       Result Value    GLUCOSE BY METER POCT 105 (*)    CBC WITH PLATELETS AND DIFFERENTIAL - Abnormal    WBC Count 9.8      RBC Count 3.20 (*)     Hemoglobin 8.5 (*)     Hematocrit 26.4 (*)     MCV 83      MCH 26.6      MCHC 32.2      RDW 14.0      Platelet Count 475 (*)     % Neutrophils 69      % Lymphocytes 19      % Monocytes 10      % Eosinophils 1      % Basophils 1      % Immature Granulocytes 1      NRBCs per 100 WBC 0      Absolute  Neutrophils 6.7      Absolute Lymphocytes 1.8      Absolute Monocytes 1.0      Absolute Eosinophils 0.1      Absolute Basophils 0.1      Absolute Immature Granulocytes 0.1      Absolute NRBCs 0.0     GLUCOSE MONITOR NURSING POCT   GLUCOSE MONITOR NURSING POCT   GLUCOSE MONITOR NURSING POCT   GLUCOSE MONITOR NURSING POCT   COMPREHENSIVE METABOLIC PANEL   MAGNESIUM   PHOSPHORUS     No orders to display          Critical care was not performed.     Medical Decision Making  The patient's presentation was of low complexity (a stable chronic illness).    The patient's evaluation involved:  review of external note(s) from 1 sources (discharge summary from yesterday)  review of 3+ test result(s) ordered prior to this encounter (labs from most recent hospital encounter earlier this week)  ordering and/or review of 3+ test(s) in this encounter (see separate area of note for details)  discussion of management or test interpretation with another health professional (wound nurse consult, social work consult)    The patient's management necessitated moderate risk (limitations due to social determinants of health (healthcare access difficulty, housing insecurity, and social isolation)).    Assessment & Plan    59-year-old male with multiple medical problems who was discharged from the hospital yesterday after prolonged stay related to abdominal emergency surgery and subsequent complications.  He was reticent to go to the medical shelter beds provided and instead went to a friend's house.  He is having difficulty treating his dressing, and felt weak when his blood sugar was down to 80 today and so he presents back to the ED requesting help with dressing change, requesting food, and asking for housing resources.  Exam his vitals are unremarkable he does not appear to be in acute distress.  He is chronically ill-appearing.  His physical exam reveals the aforementioned open abdominal wound with a dressing in place.  There is no sign  of any new acute infection.  His physical exam is otherwise unremarkable.  He was seen by social work, they request wound consult.  Medical respite bed would be available if he is able to demonstrate willingness and ability to change his own abdominal dressing.  Wound care nurse saw the patient, the patient was able to participate in and appropriately changes on dressing.  His blood sugar is normal.  He is eating and drinking without difficulty.  He is appropriate for discharge to a medical respite shelter.   is working on referrals.  He is appropriate to be discharged.  I am informed by social work a bed has been located for him with medical respite at our [facility].  The staff there will accept him however the management asked to review the case and will not be able to get final authorization until tomorrow morning.  He does not have another safe place to go.  As it is well below 0, and his first discharge from the hospital failed, we will allow him to board in the ED tonight with a plan to discharge to our [facility] tomorrow.   should be contacted in the morning to be certain we are continue with the process to get him transferred there.  Patient is agreement.  Will sign out to evening physician at shift change.  We discussed the indications for emergency department return and follow-up.  Stable for discharge.      I have reviewed the nursing notes. I have reviewed the findings, diagnosis, plan and need for follow up with the patient.    New Prescriptions    No medications on file       Final diagnoses:   Chronic abdominal wound infection, sequela   Homelessness     ILam, am serving as a trained medical scribe to document services personally performed by Aguila Beckman MD, based on the provider's statements to me.      Aguila KELLY MD, was physically present and have reviewed and verified the accuracy of this note documented by Lam Castro.     Aguila Beckman MD  [facility]  [facility] EMERGENCY DEPARTMENT  1/21/2025     Aguila Beckman MD  01/21/25 125       Aguila Beckman MD  01/21/25 2570

## 2025-01-21 NOTE — ED NOTES
Pt states he does not need any insulin coverage unless .  Wound Care RN here for dressing change and instructing pt.

## 2025-01-21 NOTE — DISCHARGE INSTRUCTIONS
Abdominal wound: Daily   Supplies: Plain gauze, Vashe,, saline, Hydrofera blue (cut into 4 strips), ABD pad (cut in half), tape.    Soak plain gauze with Vashe wound cleanser (blue bottle) and place in wound for 5 minutes. Remove and pat dry.   Moisten 2 strips of Hydrofera blue dressing with saline only. Wring out excess saline and pack into wound bed.   Cover with half ABD pad and tape in place.     Take home medications    Please start with naproxen and wound care for pain. oxycodone only needed for severe pain

## 2025-01-21 NOTE — ED NOTES
Just waiting to hear back from the shelter for pt to be discharged to it.  Respite care is reviewing his case and will get back to us

## 2025-01-21 NOTE — ED NOTES
Bed: ED19  Expected date: 1/21/25  Expected time:   Means of arrival:   Comments:  [address]  62 Y M Abd pain, Nausea

## 2025-01-21 NOTE — CONSULTS
"Consult received for Vascular access care.  See LDA for details. For additional needs place \"Nursing to Consult for Vascular Access\" [id number] order in EPIC.  "

## 2025-01-21 NOTE — PROGRESS NOTES
Patient currently in ED. Will continue to monitor chart and reach out to patient once discharged.

## 2025-01-21 NOTE — PLAN OF CARE
Physical Therapy Discharge Summary    Reason for therapy discharge:    Discharged to a shelter    Progress towards therapy goal(s). See goals on Care Plan in [facility] electronic health record for goal details.  Goals partially met.  Barriers to achieving goals:   frequent refusal of care and behavioral issues with staff.    Therapy recommendation(s):    No further therapy is recommended.

## 2025-01-21 NOTE — CONSULTS
met with Pt to explore discharge options/needs. SW identified that in order for Pt to go to a respite shelter he will need to demonstrate an ability to change his own dressings independently. Pt indicated he was open to this. SW updated provider and Provider will request WOC and Pt's nurse provide education and document when Pt is independent with dressing changes.   Pt has been accepted  by [facility]  of 2:14[facility] and they have 24 hours to review/get back to SW. [facility] has not beds today and none anticipate this week.   Jordan Jennings, [facility]  10 ICU & [place] ED   Ph: [phone]

## 2025-01-22 VITALS
HEART RATE: 101 BPM | DIASTOLIC BLOOD PRESSURE: 83 MMHG | BODY MASS INDEX: 24.92 KG/M2 | HEIGHT: 64 IN | OXYGEN SATURATION: 95 % | RESPIRATION RATE: 18 BRPM | SYSTOLIC BLOOD PRESSURE: 125 MMHG | WEIGHT: 146 LBS | TEMPERATURE: 99 F

## 2025-01-22 LAB
GLUCOSE BLDC GLUCOMTR-MCNC: 156 MG/DL (ref 70–99)
GLUCOSE BLDC GLUCOMTR-MCNC: 171 MG/DL (ref 70–99)

## 2025-01-22 PROCEDURE — 82962 GLUCOSE BLOOD TEST: CPT

## 2025-01-22 PROCEDURE — 250N000013 HC RX MED GY IP 250 OP 250 PS 637: Performed by: FAMILY MEDICINE

## 2025-01-22 PROCEDURE — 250N000013 HC RX MED GY IP 250 OP 250 PS 637: Performed by: EMERGENCY MEDICINE

## 2025-01-22 PROCEDURE — 250N000012 HC RX MED GY IP 250 OP 636 PS 637: Performed by: FAMILY MEDICINE

## 2025-01-22 RX ORDER — BICTEGRAVIR SODIUM, EMTRICITABINE, AND TENOFOVIR ALAFENAMIDE FUMARATE 50; 200; 25 MG/1; MG/1; MG/1
1 TABLET ORAL DAILY
Qty: 30 TABLET | Refills: 0 | Status: SHIPPED | OUTPATIENT
Start: 2025-01-22

## 2025-01-22 RX ORDER — NAPROXEN 500 MG/1
500 TABLET ORAL 2 TIMES DAILY WITH MEALS
Qty: 20 TABLET | Refills: 0 | Status: SHIPPED | OUTPATIENT
Start: 2025-01-22

## 2025-01-22 RX ORDER — OXYCODONE HYDROCHLORIDE 5 MG/1
5 TABLET ORAL EVERY 8 HOURS PRN
Qty: 12 TABLET | Refills: 0 | Status: SHIPPED | OUTPATIENT
Start: 2025-01-22 | End: 2025-01-22

## 2025-01-22 RX ORDER — ACETAMINOPHEN 325 MG/1
650 TABLET ORAL EVERY 4 HOURS PRN
Status: DISCONTINUED | OUTPATIENT
Start: 2025-01-22 | End: 2025-01-22 | Stop reason: HOSPADM

## 2025-01-22 RX ORDER — IBUPROFEN 600 MG/1
600 TABLET, FILM COATED ORAL EVERY 6 HOURS PRN
Status: DISCONTINUED | OUTPATIENT
Start: 2025-01-22 | End: 2025-01-22

## 2025-01-22 RX ORDER — NAPROXEN 500 MG/1
500 TABLET ORAL 2 TIMES DAILY WITH MEALS
Qty: 20 TABLET | Refills: 0 | Status: SHIPPED | OUTPATIENT
Start: 2025-01-22 | End: 2025-01-22

## 2025-01-22 RX ORDER — OXYCODONE HYDROCHLORIDE 5 MG/1
5 TABLET ORAL EVERY 8 HOURS PRN
Qty: 12 TABLET | Refills: 0 | Status: SHIPPED | OUTPATIENT
Start: 2025-01-22 | End: 2025-01-26

## 2025-01-22 RX ORDER — OXYCODONE HYDROCHLORIDE 5 MG/1
5 TABLET ORAL EVERY 8 HOURS PRN
Status: DISCONTINUED | OUTPATIENT
Start: 2025-01-22 | End: 2025-01-22 | Stop reason: HOSPADM

## 2025-01-22 RX ORDER — ACETAMINOPHEN 500 MG
1000 TABLET ORAL ONCE
Status: COMPLETED | OUTPATIENT
Start: 2025-01-22 | End: 2025-01-22

## 2025-01-22 RX ORDER — BICTEGRAVIR SODIUM, EMTRICITABINE, AND TENOFOVIR ALAFENAMIDE FUMARATE 50; 200; 25 MG/1; MG/1; MG/1
1 TABLET ORAL DAILY
Qty: 30 TABLET | Refills: 0 | Status: SHIPPED | OUTPATIENT
Start: 2025-01-22 | End: 2025-01-22

## 2025-01-22 RX ADMIN — INSULIN ASPART 1 UNITS: 100 INJECTION, SOLUTION INTRAVENOUS; SUBCUTANEOUS at 06:50

## 2025-01-22 RX ADMIN — OXYCODONE 5 MG: 5 TABLET ORAL at 11:41

## 2025-01-22 RX ADMIN — NAPROXEN 500 MG: 500 TABLET ORAL at 08:42

## 2025-01-22 RX ADMIN — INSULIN ASPART 1 UNITS: 100 INJECTION, SOLUTION INTRAVENOUS; SUBCUTANEOUS at 11:59

## 2025-01-22 RX ADMIN — BICTEGRAVIR SODIUM, EMTRICITABINE, AND TENOFOVIR ALAFENAMIDE FUMARATE 1 TABLET: 50; 200; 25 TABLET ORAL at 07:01

## 2025-01-22 RX ADMIN — INSULIN GLARGINE 15 UNITS: 100 INJECTION, SOLUTION SUBCUTANEOUS at 06:51

## 2025-01-22 RX ADMIN — PANTOPRAZOLE SODIUM 40 MG: 40 TABLET, DELAYED RELEASE ORAL at 06:58

## 2025-01-22 RX ADMIN — ANTACID TABLETS 500 MG: 500 TABLET, CHEWABLE ORAL at 14:49

## 2025-01-22 ASSESSMENT — ACTIVITIES OF DAILY LIVING (ADL)
ADLS_ACUITY_SCORE: 59

## 2025-01-22 NOTE — MEDICATION SCRIBE - ADMISSION MEDICATION HISTORY
Medication Scribe Admission Medication History    Admission medication history is complete. The information provided in this note is only as accurate as the sources available at the time of the update.    Information Source(s): Patient via in-person    Pertinent Information: Patient was discharged from [facility] unit on 01/20/25. Patient confirmed current medications as below. He reports taking Lantus insulin 25 units every morning and Novolog 10 units TID with meals. Patient reports last dose of medications was two days ago. Patient states he ran out of his glucose sensor and needs a new order.    Changes made to PTA medication list:  Added: None    Deleted: Patient reports not taking----Tylenol 325 mg tab                                                                        Albuterol inhaler                                                                        Famotidine 40 mg tab                                                                        Methocarbamol 750 mg tab                                                                        Naproxen 500 mg tab                                                                        POLYTRIM ophthalmic solution  Changed: None    Allergies reviewed with patient and updates made in EHR: yes    Medication History Completed By: Tong Clay 1/22/2025 8:15 AM    PTA Med List   Medication Sig Last Dose/Taking    bictegravir-emtricitabine-tenofovir (BIKTARVY) -25 MG per tablet Take 1 tablet by mouth daily. 1/20/2025    blood glucose (NO BRAND SPECIFIED) lancets standard Use to test blood sugar 1 time daily or as directed. Taking    blood glucose (NO BRAND SPECIFIED) test strip Use to test blood sugar 1 time daily or as directed. Taking    blood glucose monitoring (NO BRAND SPECIFIED) meter device kit Use to test blood sugar 3 times daily or as directed. Taking    Continuous Glucose Sensor (DEXCOM G6 SENSOR) MISC 1 each every 10 days Change every 10  days. Taking    Continuous Glucose Sensor (DEXCOM G7 SENSOR) MISC 1 Device every 10 days. Taking    Continuous Glucose Transmitter (DEXCOM G6 TRANSMITTER) MISC 1 each every 3 months Change every 3 months. Taking    insulin aspart (NOVOLOG PEN) 100 UNIT/ML pen Inject 10 Units subcutaneously 3 times daily (with meals). Take only when you have access to a meal. 1/20/2025    insulin glargine (LANTUS PEN) 100 UNIT/ML pen Inject 25 Units subcutaneously every morning. REDUCE TO 15 UNITS IF YOU THINK YOU WILL NOT HAVE FOOD THAT DAY. 1/20/2025    insulin pen needle (32G X 4 MM) 32G X 4 MM miscellaneous Use 4-5 pen needles daily or as directed. Taking    Nutritional Supplements (ENSURE ACTIVE) LIQD Take 414 mLs by mouth daily 1/20/2025    oxyCODONE (ROXICODONE) 5 MG tablet Take 1 tablet (5 mg) by mouth or Feeding Tube every 6 hours as needed for moderate pain. 1/20/2025    pantoprazole (PROTONIX) 40 MG EC tablet Take 1 tablet (40 mg) by mouth 2 times daily (before meals). 1/20/2025

## 2025-01-22 NOTE — ED PROVIDER NOTES
Emergency Department I-PASS Sign-out      Illness Severity: Stable    Patient Summary:  59 year old male with pertinent PMH of diabetes, recent 30-day surgical admission for surgery and subsequent wound complications, discharged yesterday who presented with need for wound dressing change, homelessness, failed discharge plan    ED Course/treatment plan: He is medically stable.  He was seen by the wound care nurse and demonstrated ability to properly change his dressing.  His diabetic meds were ordered, he is eating without difficulty.  He agreed to shelter placement (initially refused at the time of discharge from the hospital yesterday after 30 days), there is a bed available for medical respite at [facility] so we are awaiting the final approval of the director there.   involved.  He has no safe warm place to go tonight and so was boarding in the ED with a plan to be admitted to the medical respite shelter tomorrow.    Clinical Impression:  (S31.109S,  L08.9) Chronic abdominal wound infection, sequela    (Z59.00) Homelessness      Edited by: Aguila Beckman MD at 1/21/2025 9783    Action List:  -To do:    Nothing    Situational Awareness & Contingency Planning:  Code Status (Most recent):  Prior    Disposition:  likely to be discharged to medical respite shelter [facility].  ED  involved.    Edited by: Aguila Beckman MD at 1/21/2025 8323    Synthesis & Events after sign-out:  2:30 PM face-to-face reassessment -I have reviewed Andrew's medication and medical supplies and hand.  Will prescribe as needed oxycodone for breakthrough pain, additional insulin and home Biktarvy.  Patient has wound care supplies at bedside    I have discussed with Darrin as  who has organized a respite home for postsurgical/ED care      Thanh Avila MD   Emergency Medicine       Thanh Avila MD  01/22/25 1430       Thanh Avila MD  01/22/25 1431

## 2025-01-22 NOTE — ED TRIAGE NOTES
Open wound to abd.  Surgery in Dec, 2024.  Large open abd pain.  NIDDM   in rig.   Called 911 because he could not make it to bus stop.  Pt is homeless.   Sores on face and arms.      Triage Assessment (Adult)       Row Name 01/21/25 1029          Triage Assessment    Airway WDL WDL        Respiratory WDL    Respiratory WDL WDL        Cardiac WDL    Cardiac WDL WDL        Peripheral/Neurovascular WDL    Peripheral Neurovascular WDL WDL        Cognitive/Neuro/Behavioral WDL    Cognitive/Neuro/Behavioral WDL WDL        Decker Coma Scale    Best Eye Response 4-->(E4) spontaneous     Best Motor Response 6-->(M6) obeys commands     Best Verbal Response 5-->(V5) oriented     Decker Coma Scale Score 15

## 2025-01-22 NOTE — ED PROVIDER NOTES
Patient was signed out to me at change of shift by Dr. Beckman, please see his note for full details.  Briefly, this is a 59-year-old male with multiple medical problems who had a recent prolonged hospital stay at the [facility] for small bowel obstruction requiring exploratory laparotomy with small bowel resection complicated by reanastomosis, with large open wound which is requiring dressings/wound care.  Patient does have a history of HIV as well.    Dr. Beckman was able to have social work and wound care see the patient in the emergency department, please see their notes for full details.  Plan was to get him a medical respite bed at [facility].  There is a bed available, however the staff there requested that their management review the case therefore they will not be able to get final authorization until tomorrow morning.  He does not have another safe place to go.  He is homeless.  Plan will be to allow the patient to stay here in the emergency department overnight in anticipation of him going to a medical respite bed tomorrow at [facility].     Leticia Cruz MD  01/21/25 0708

## 2025-01-22 NOTE — PROGRESS NOTES
Care Management Discharge Note    Discharge Date: 1/22/25       Discharge Disposition:  Our [facility] is [address] [place] [address].       Discharge Services:  Medical Respite Care    Discharge DME:  NA    Discharge Transportation:  [facility] [employer] [facility] transport 5:15PM today 1/22. I gave them the ED number to call if there are any concerns.     Private pay costs discussed: Not applicable    Does the patient's insurance plan have a 3 day qualifying hospital stay waiver?  No    PAS Confirmation Code:NA    Patient/family educated on Medicare website which has current facility and service quality ratings:  NA    Education Provided on the Discharge Plan:  Yes  Persons Notified of Discharge Plans: Provider, Pt, Our [facility] staff.   Patient/Family in Agreement with the Plan:  Yes    Handoff Referral Completed: No, handoff not indicated or clinically appropriate    Additional Information:  Pt will discharge to Our [facility]pite [facility] once all his medications and supplies are ready. Medical discharge cab is set up.  [facility] to pick Pt  up at ED entrance, [address] [facility]      Jordan Jennings [facility]  10 ICU & [place] ED   Ph: [phone]

## 2025-01-22 NOTE — ED NOTES
Patient given personal care items including toothbrush, toothpaste, comb, deodorant, soap, and washcloths.

## 2025-01-22 NOTE — ED NOTES
Rn performed wound care to pt, based on instruction from wound care nurse, pt in pain but tolerated well.

## 2025-01-22 NOTE — ED NOTES
"[facility]   ED Nurse to Floor Handoff     Andrew Collier is a 59 year old male who speaks English and lives unknown,  is homeless  They arrived in the ED by ambulance from outside    ED Chief Complaint: Generalized Weakness (Open wound to abd.  Surgery in Dec, 2024.  Large open abd pain.  NIDDM   in rig.   Called 911 because he could not make it to bus stop.  Pt is homeless.   Sores on face and arms.)    ED Dx;   Final diagnoses:   Chronic abdominal wound infection, sequela   Homelessness         Needed?: No    Allergies:   Allergies   Allergen Reactions    Fentanyl Blisters     Per pt, after taking this medication had blisters develope    Tylenol [Acetaminophen] Itching     12/27/24 Patient has received acetaminophen multiple times since documentation of this reaction, in 2016 patient was given acetaminophen with codeine - more likely that codeine would cause itching    Dulaglutide Rash    Insulin Lispro Rash     Patient reported    Penicillin V Other (See Comments) and Rash     Diffuse maculopapular rash + feels \"high\", per pt.     Profile shows many uses of Zosyn    .  Past Medical Hx:   Past Medical History:   Diagnosis Date    Acute appendicitis with localized peritonitis 01/31/2018    AIDS (H)     Allergic rhinitis due to other allergen     DNS    Anal dysplasia     Chronic abdominal pain     CNS toxoplasmosis (H)     COVID-19 01/11/2022    Diabetes type 2, controlled (H)     GERD (gastroesophageal reflux disease)     Herpes zoster 09/23/2016    History of seizure     History of substance abuse (H)     HIV (human immunodeficiency virus infection) (H)     HLD (hyperlipidemia)     Lung nodules     Periungual wart     Pneumonia 01/06/2019    PTSD (post-traumatic stress disorder)     Sleep apnea     doesn't use CPAP      Baseline Mental status: WDL  Current Mental Status changes: at basesline    Infection present or suspected this encounter: " yes skin/wound/contact  Sepsis suspected: No  Isolation type: Contact  Patient tested for COVID 19 prior to admission: YES     Activity level - Baseline/Home:  Independent  Activity Level - Current:   Independent    Bariatric equipment needed?: No    In the ED these meds were given:   Medications   glucose gel 15-30 g (has no administration in time range)     Or   dextrose 50 % injection 25-50 mL (has no administration in time range)     Or   glucagon injection 1 mg (has no administration in time range)   albuterol (PROVENTIL HFA/VENTOLIN HFA) inhaler (has no administration in time range)   bictegravir-emtricitabine-tenofovir (BIKTARVY) -25 MG per tablet 1 tablet (1 tablet Oral $Given 1/21/25 1948)   naproxen (NAPROSYN) tablet 500 mg (500 mg Oral $Given 1/21/25 1919)   pantoprazole (PROTONIX) EC tablet 40 mg (has no administration in time range)   glucose gel 15-30 g (has no administration in time range)     Or   dextrose 50 % injection 25-50 mL (has no administration in time range)     Or   glucagon injection 1 mg (has no administration in time range)   insulin glargine (LANTUS PEN) injection 15 Units (has no administration in time range)   insulin aspart (NovoLOG) injection (RAPID ACTING) (1 Units Subcutaneous $Given 1/21/25 1825)   insulin aspart (NovoLOG) injection (RAPID ACTING) (has no administration in time range)   calcium carbonate (TUMS) chewable tablet 500 mg (has no administration in time range)   alum & mag hydroxide-simethicone (MAALOX) suspension 15 mL (15 mLs Oral $Given 1/21/25 1202)       Drips running?  No    Home pump  No    Current LDAs  Wound Abdomen (Active)   Wound Bed Other (Comment) 01/21/25 1106   Estimated Circumference ( if not measured Other (Comment) 01/21/25 1106   Number of days:        Incision/Surgical Site 12/20/24 Midline Abdomen (Active)   Number of days: 32       Labs results:   Labs Ordered and Resulted from Time of ED Arrival to Time of ED Departure   COMPREHENSIVE  "METABOLIC PANEL - Abnormal       Result Value    Sodium 132 (*)     Potassium 3.4      Carbon Dioxide (CO2) 26      Anion Gap 10      Urea Nitrogen 15.8      Creatinine 0.72      GFR Estimate >90      Calcium 9.2      Chloride 96 (*)     Glucose 104 (*)     Alkaline Phosphatase 364 (*)     AST 29      ALT 24      Protein Total 8.6 (*)     Albumin 3.1 (*)     Bilirubin Total 0.5     GLUCOSE BY METER - Abnormal    GLUCOSE BY METER POCT 105 (*)    CBC WITH PLATELETS AND DIFFERENTIAL - Abnormal    WBC Count 9.8      RBC Count 3.20 (*)     Hemoglobin 8.5 (*)     Hematocrit 26.4 (*)     MCV 83      MCH 26.6      MCHC 32.2      RDW 14.0      Platelet Count 475 (*)     % Neutrophils 69      % Lymphocytes 19      % Monocytes 10      % Eosinophils 1      % Basophils 1      % Immature Granulocytes 1      NRBCs per 100 WBC 0      Absolute Neutrophils 6.7      Absolute Lymphocytes 1.8      Absolute Monocytes 1.0      Absolute Eosinophils 0.1      Absolute Basophils 0.1      Absolute Immature Granulocytes 0.1      Absolute NRBCs 0.0     HEMOGLOBIN A1C - Abnormal    Estimated Average Glucose 240 (*)     Hemoglobin A1C 10.0 (*)    GLUCOSE BY METER - Abnormal    GLUCOSE BY METER POCT 159 (*)    MAGNESIUM - Normal    Magnesium 2.0     PHOSPHORUS - Normal    Phosphorus 3.4     GLUCOSE MONITOR NURSING POCT   GLUCOSE MONITOR NURSING POCT   GLUCOSE MONITOR NURSING POCT   GLUCOSE MONITOR NURSING POCT   GLUCOSE MONITOR NURSING POCT   GLUCOSE MONITOR NURSING POCT       Imaging Studies: No results found for this or any previous visit (from the past 24 hours).    Recent vital signs:   BP (!) 134/90   Pulse 100   Temp 97.5  F (36.4  C) (Oral)   Resp 16   Ht 1.626 m (5' 4\")   SpO2 99%   BMI 25.16 kg/m      Sheyal Coma Scale Score: 15 (01/21/25 1029)       Cardiac Rhythm: Normal Sinus  Pt needs tele? No  Skin/wound Issues:  Large abdominal open wound, wound care consulted and instructions given to patient    Code Status: Full Code    Pain " control: good    Nausea control: pt had none    Abnormal labs/tests/findings requiring intervention: N/A    Family present during ED course? No   Family Comments/Social Situation comments: N/A    Tasks needing completion:  Follow-up with wound team, needs re-teaching about wound care at home    Elena Lockhart RN  [facility] --   [phone] [place] ED  [phone] [facility]

## 2025-01-23 ENCOUNTER — PATIENT OUTREACH (OUTPATIENT)
Dept: CARE COORDINATION | Facility: CLINIC | Age: 60
End: 2025-01-23
Payer: COMMERCIAL

## 2025-01-23 NOTE — PROGRESS NOTES
Andrew Collier is a patient of Dr. Bienvenido Obrien that underwent exploratory laparotomy with small bowel resection on 12/24. Discharged on 1/20. Re-admitted to the ED on 1/21 and discharged again yesterday, 1/22. Attempted to contact patient via telephone for a status update and review post-op  teaching. The only phone number listed in patients chart is for a business. Patient does not have a phone number. Discharged to [facility].

## 2025-01-23 NOTE — PROGRESS NOTES
"Our [facility] Medical Respite Program  Shelter RN Note-Initial RN assessment    Date of Our [facility]y [facility]ol[facility] (OSH) entry:01/22/2025  Date of RN Assessment:  01/23/2025  Discharging hospital: [facility]  Reason for recent hospitalization: Needed assistance with wound care, homeless, abdominal pain  Health Insurance:  [employer] [facility]  Patient's communication style:  Bilingual; speaks English and Yi           PHYSICAL ASSESSMENT (focused, screening only):    Symptoms reported by mika today: Denies any symptoms; states bowels are \"working perfectly\" and he denies pain.    Symptoms observed by RN today: While RN was completing wound care, he grimaced occasionally, but denied pain.       PSYCHOSOCIAL/CARE COORDINATION ASSESSMENT:  Primary Care Provider: MAHENDRA Palm: Dr. David Cortes PCP Appt:  1/30/25 @ 11:30am     Specialty Care Provider(s): [facility]  Next Specialty Appointment(s):  3/13/25 @ 7:30am      Living Environment:   People in home: Mika lives in a single room at a 21 bed emergency shelter in [place].      Current living Arrangements:  Homeless Shelter       Able to return to prior arrangements: Mika prefers to go back to [facility]. He said his counselor there is Mirian; RN called [facility] [phone]) and was told Mirian's extension is [phone]. RN left voicemail for Mirian and requested call back.        Transportation:  Concerns:  No car  Anticipated:  Health Plan transportation and/or bus.       Family/Social Support:  Care provided by: Self  Provides care for: No One                Description of Support System:  Reports he has no family in the [place], but does have acquaintances.         Current Resources:   Patient receiving home care services: None      Community Resources:    Equipment currently used at home:  None  Supplies currently used at home:  Wound care supplies    Employment/Financial:  Employment Status:  " "Unemployed       Financial Concerns: His only source of income is general assistance.             Mental Health Status: Reports he is very emotional right now; \"everything makes me cry.\"           Chemical Dependency Status: Uses meth; wants to go back to treatment at [facility].          RN interventions today:   Reviewed discharge instructions  : Yes  Medication set up  : Yes; RN reviewed medications, Andrew knew all of his medications and when/how to take them. He was aware of wound care supplies, but reported not knowing how to complete his own wound care. RN assisted. RN will work to support his independence in completing wound care.   Reviewed upcoming appointments  : Yes  Transportation to follow up appointments arranged  : Yes  Entered OSH address as temporary demographics in epic  : Yes    CARE PLAN:     Anticipated medical respite length of stay:30 Days     Date Goals Progress (New, Pending, Met, Unmet) Tasks/Barriers   01/23/2025 Andrew will complete all wound care independently.  New 1/23/25: When RN went to Andrew's room, he said that his wound was \"itchy.\" RN asked if he could complete his own wound care, he said \"I can't, they said you would do it for me.\" RN completed wound care per instructions. RN will continue to work with Andrew to complete wound care independently.    01/23/25 Andrew will take all medications as rx'd.  New 1/23/25: RN and Andrew went through all medications, he knew how to take them and when to take them. Denied needing a med box.    01/23/25 Andrew will transition from OSH to [facility] New 1/23/25: RN called Mirian at [facility]; she e-mail RN an MAGGY. RN will have Andrew sign the MAGGY and send it back to Mirian so we can coordinate transition from OS to [facility].      Sandra De Jesus RN  [facility] Medical Respite Program  Ph: [phone], fax: [phone]       "

## 2025-01-24 ENCOUNTER — HOSPITAL ENCOUNTER (INPATIENT)
Facility: CLINIC | Age: 60
End: 2025-01-24
Attending: FAMILY MEDICINE | Admitting: HOSPITALIST
Payer: COMMERCIAL

## 2025-01-24 DIAGNOSIS — R10.84 ABDOMINAL PAIN, GENERALIZED: ICD-10-CM

## 2025-01-24 DIAGNOSIS — R10.84 GENERALIZED ABDOMINAL PAIN: ICD-10-CM

## 2025-01-24 DIAGNOSIS — K56.600 PARTIAL SMALL BOWEL OBSTRUCTION (H): ICD-10-CM

## 2025-01-24 DIAGNOSIS — Z21 HUMAN IMMUNODEFICIENCY VIRUS I INFECTION (H): ICD-10-CM

## 2025-01-24 DIAGNOSIS — T14.8XXA OPEN WOUND: Primary | ICD-10-CM

## 2025-01-24 DIAGNOSIS — K21.00 GASTROESOPHAGEAL REFLUX DISEASE WITH ESOPHAGITIS, UNSPECIFIED WHETHER HEMORRHAGE: ICD-10-CM

## 2025-01-24 DIAGNOSIS — R73.9 HYPERGLYCEMIA: ICD-10-CM

## 2025-01-24 DIAGNOSIS — K56.609 SBO (SMALL BOWEL OBSTRUCTION) (H): ICD-10-CM

## 2025-01-24 DIAGNOSIS — K56.609 SMALL BOWEL OBSTRUCTION (H): ICD-10-CM

## 2025-01-24 DIAGNOSIS — I21.4 NSTEMI (NON-ST ELEVATED MYOCARDIAL INFARCTION) (H): ICD-10-CM

## 2025-01-24 LAB
ALBUMIN SERPL BCG-MCNC: 3.1 G/DL (ref 3.5–5.2)
ALBUMIN UR-MCNC: 20 MG/DL
ALP SERPL-CCNC: 298 U/L (ref 40–150)
ALT SERPL W P-5'-P-CCNC: 26 U/L (ref 0–70)
ANION GAP SERPL CALCULATED.3IONS-SCNC: 12 MMOL/L (ref 7–15)
APPEARANCE UR: CLEAR
AST SERPL W P-5'-P-CCNC: 31 U/L (ref 0–45)
BASOPHILS # BLD AUTO: 0.1 10E3/UL (ref 0–0.2)
BASOPHILS NFR BLD AUTO: 1 %
BILIRUB SERPL-MCNC: 0.3 MG/DL
BILIRUB UR QL STRIP: NEGATIVE
BUN SERPL-MCNC: 12.9 MG/DL (ref 8–23)
CALCIUM SERPL-MCNC: 8.7 MG/DL (ref 8.8–10.4)
CHLORIDE SERPL-SCNC: 98 MMOL/L (ref 98–107)
COLOR UR AUTO: ABNORMAL
CREAT SERPL-MCNC: 0.61 MG/DL (ref 0.67–1.17)
EGFRCR SERPLBLD CKD-EPI 2021: >90 ML/MIN/1.73M2
EOSINOPHIL # BLD AUTO: 0.2 10E3/UL (ref 0–0.7)
EOSINOPHIL NFR BLD AUTO: 2 %
ERYTHROCYTE [DISTWIDTH] IN BLOOD BY AUTOMATED COUNT: 14.6 % (ref 10–15)
GLUCOSE SERPL-MCNC: 143 MG/DL (ref 70–99)
GLUCOSE UR STRIP-MCNC: NEGATIVE MG/DL
HCO3 SERPL-SCNC: 23 MMOL/L (ref 22–29)
HCT VFR BLD AUTO: 25.7 % (ref 40–53)
HGB BLD-MCNC: 8.2 G/DL (ref 13.3–17.7)
HGB UR QL STRIP: NEGATIVE
IMM GRANULOCYTES # BLD: 0.1 10E3/UL
IMM GRANULOCYTES NFR BLD: 1 %
KETONES UR STRIP-MCNC: NEGATIVE MG/DL
LACTATE SERPL-SCNC: 1.4 MMOL/L (ref 0.7–2)
LEUKOCYTE ESTERASE UR QL STRIP: NEGATIVE
LIPASE SERPL-CCNC: 39 U/L (ref 13–60)
LYMPHOCYTES # BLD AUTO: 1.5 10E3/UL (ref 0.8–5.3)
LYMPHOCYTES NFR BLD AUTO: 17 %
MCH RBC QN AUTO: 26.7 PG (ref 26.5–33)
MCHC RBC AUTO-ENTMCNC: 31.9 G/DL (ref 31.5–36.5)
MCV RBC AUTO: 84 FL (ref 78–100)
MONOCYTES # BLD AUTO: 0.8 10E3/UL (ref 0–1.3)
MONOCYTES NFR BLD AUTO: 9 %
MUCOUS THREADS #/AREA URNS LPF: PRESENT /LPF
NEUTROPHILS # BLD AUTO: 6.4 10E3/UL (ref 1.6–8.3)
NEUTROPHILS NFR BLD AUTO: 71 %
NITRATE UR QL: NEGATIVE
NRBC # BLD AUTO: 0 10E3/UL
NRBC BLD AUTO-RTO: 0 /100
PH UR STRIP: 6 [PH] (ref 5–7)
PLATELET # BLD AUTO: 450 10E3/UL (ref 150–450)
POTASSIUM SERPL-SCNC: 4.2 MMOL/L (ref 3.4–5.3)
PROT SERPL-MCNC: 7.5 G/DL (ref 6.4–8.3)
RBC # BLD AUTO: 3.07 10E6/UL (ref 4.4–5.9)
RBC URINE: 2 /HPF
SODIUM SERPL-SCNC: 133 MMOL/L (ref 135–145)
SP GR UR STRIP: 1.03 (ref 1–1.03)
UROBILINOGEN UR STRIP-MCNC: NORMAL MG/DL
WBC # BLD AUTO: 9 10E3/UL (ref 4–11)
WBC URINE: 1 /HPF

## 2025-01-24 PROCEDURE — 83690 ASSAY OF LIPASE: CPT | Performed by: FAMILY MEDICINE

## 2025-01-24 PROCEDURE — 99254 IP/OBS CNSLTJ NEW/EST MOD 60: CPT | Mod: 24 | Performed by: SURGERY

## 2025-01-24 PROCEDURE — 250N000011 HC RX IP 250 OP 636: Performed by: FAMILY MEDICINE

## 2025-01-24 PROCEDURE — 999N000040 HC STATISTIC CONSULT NO CHARGE VASC ACCESS

## 2025-01-24 PROCEDURE — 84155 ASSAY OF PROTEIN SERUM: CPT | Performed by: FAMILY MEDICINE

## 2025-01-24 PROCEDURE — 87040 BLOOD CULTURE FOR BACTERIA: CPT | Performed by: FAMILY MEDICINE

## 2025-01-24 PROCEDURE — 83605 ASSAY OF LACTIC ACID: CPT | Performed by: FAMILY MEDICINE

## 2025-01-24 PROCEDURE — 250N000011 HC RX IP 250 OP 636: Performed by: STUDENT IN AN ORGANIZED HEALTH CARE EDUCATION/TRAINING PROGRAM

## 2025-01-24 PROCEDURE — 258N000003 HC RX IP 258 OP 636: Performed by: FAMILY MEDICINE

## 2025-01-24 PROCEDURE — 120N000002 HC R&B MED SURG/OB UMMC

## 2025-01-24 PROCEDURE — 99285 EMERGENCY DEPT VISIT HI MDM: CPT | Mod: 25 | Performed by: FAMILY MEDICINE

## 2025-01-24 PROCEDURE — 250N000009 HC RX 250: Performed by: FAMILY MEDICINE

## 2025-01-24 PROCEDURE — 85025 COMPLETE CBC W/AUTO DIFF WBC: CPT | Performed by: FAMILY MEDICINE

## 2025-01-24 PROCEDURE — 82310 ASSAY OF CALCIUM: CPT | Performed by: FAMILY MEDICINE

## 2025-01-24 PROCEDURE — 99223 1ST HOSP IP/OBS HIGH 75: CPT | Mod: FS | Performed by: HOSPITALIST

## 2025-01-24 PROCEDURE — 99207 PR APP CREDIT; MD BILLING SHARED VISIT: CPT | Performed by: NURSE PRACTITIONER

## 2025-01-24 PROCEDURE — 250N000011 HC RX IP 250 OP 636: Performed by: INTERNAL MEDICINE

## 2025-01-24 PROCEDURE — 96374 THER/PROPH/DIAG INJ IV PUSH: CPT | Mod: 59 | Performed by: FAMILY MEDICINE

## 2025-01-24 PROCEDURE — 81001 URINALYSIS AUTO W/SCOPE: CPT | Performed by: FAMILY MEDICINE

## 2025-01-24 PROCEDURE — 36415 COLL VENOUS BLD VENIPUNCTURE: CPT | Performed by: FAMILY MEDICINE

## 2025-01-24 PROCEDURE — 99285 EMERGENCY DEPT VISIT HI MDM: CPT | Performed by: FAMILY MEDICINE

## 2025-01-24 RX ORDER — HYDROMORPHONE HYDROCHLORIDE 1 MG/ML
0.5 INJECTION, SOLUTION INTRAMUSCULAR; INTRAVENOUS; SUBCUTANEOUS EVERY 30 MIN PRN
Status: DISCONTINUED | OUTPATIENT
Start: 2025-01-24 | End: 2025-01-24

## 2025-01-24 RX ORDER — IOPAMIDOL 755 MG/ML
100 INJECTION, SOLUTION INTRAVASCULAR ONCE
Status: COMPLETED | OUTPATIENT
Start: 2025-01-24 | End: 2025-01-24

## 2025-01-24 RX ORDER — LIDOCAINE 40 MG/G
CREAM TOPICAL
Status: DISCONTINUED | OUTPATIENT
Start: 2025-01-24 | End: 2025-02-03 | Stop reason: HOSPADM

## 2025-01-24 RX ORDER — HYDROMORPHONE HCL IN WATER/PF 6 MG/30 ML
0.2 PATIENT CONTROLLED ANALGESIA SYRINGE INTRAVENOUS ONCE
Status: COMPLETED | OUTPATIENT
Start: 2025-01-24 | End: 2025-01-24

## 2025-01-24 RX ORDER — ACETAMINOPHEN 650 MG/1
650 SUPPOSITORY RECTAL EVERY 4 HOURS PRN
Status: DISCONTINUED | OUTPATIENT
Start: 2025-01-24 | End: 2025-02-03 | Stop reason: HOSPADM

## 2025-01-24 RX ORDER — ACETAMINOPHEN 325 MG/1
650 TABLET ORAL EVERY 4 HOURS PRN
Status: DISCONTINUED | OUTPATIENT
Start: 2025-01-24 | End: 2025-02-03 | Stop reason: HOSPADM

## 2025-01-24 RX ORDER — NICOTINE POLACRILEX 4 MG
15-30 LOZENGE BUCCAL
Status: DISCONTINUED | OUTPATIENT
Start: 2025-01-24 | End: 2025-02-03 | Stop reason: HOSPADM

## 2025-01-24 RX ORDER — ONDANSETRON 2 MG/ML
4 INJECTION INTRAMUSCULAR; INTRAVENOUS EVERY 6 HOURS PRN
Status: DISCONTINUED | OUTPATIENT
Start: 2025-01-24 | End: 2025-02-03 | Stop reason: HOSPADM

## 2025-01-24 RX ORDER — ONDANSETRON 2 MG/ML
4 INJECTION INTRAMUSCULAR; INTRAVENOUS EVERY 30 MIN PRN
Status: DISCONTINUED | OUTPATIENT
Start: 2025-01-24 | End: 2025-01-24

## 2025-01-24 RX ORDER — SODIUM CHLORIDE 9 MG/ML
INJECTION, SOLUTION INTRAVENOUS CONTINUOUS
Status: DISCONTINUED | OUTPATIENT
Start: 2025-01-24 | End: 2025-01-26

## 2025-01-24 RX ORDER — POLYETHYLENE GLYCOL 3350 17 G/17G
17 POWDER, FOR SOLUTION ORAL 2 TIMES DAILY PRN
Status: DISCONTINUED | OUTPATIENT
Start: 2025-01-24 | End: 2025-02-03 | Stop reason: HOSPADM

## 2025-01-24 RX ORDER — ONDANSETRON 4 MG/1
4 TABLET, ORALLY DISINTEGRATING ORAL EVERY 6 HOURS PRN
Status: DISCONTINUED | OUTPATIENT
Start: 2025-01-24 | End: 2025-02-03 | Stop reason: HOSPADM

## 2025-01-24 RX ORDER — DEXTROSE MONOHYDRATE 25 G/50ML
25-50 INJECTION, SOLUTION INTRAVENOUS
Status: DISCONTINUED | OUTPATIENT
Start: 2025-01-24 | End: 2025-02-03 | Stop reason: HOSPADM

## 2025-01-24 RX ORDER — CALCIUM CARBONATE 500 MG/1
1000 TABLET, CHEWABLE ORAL 4 TIMES DAILY PRN
Status: DISCONTINUED | OUTPATIENT
Start: 2025-01-24 | End: 2025-02-03 | Stop reason: HOSPADM

## 2025-01-24 RX ORDER — BISACODYL 5 MG
10 TABLET, DELAYED RELEASE (ENTERIC COATED) ORAL DAILY PRN
Status: DISCONTINUED | OUTPATIENT
Start: 2025-01-24 | End: 2025-02-03 | Stop reason: HOSPADM

## 2025-01-24 RX ORDER — BISACODYL 5 MG
5 TABLET, DELAYED RELEASE (ENTERIC COATED) ORAL DAILY PRN
Status: DISCONTINUED | OUTPATIENT
Start: 2025-01-24 | End: 2025-02-03 | Stop reason: HOSPADM

## 2025-01-24 RX ADMIN — HYDROMORPHONE HYDROCHLORIDE 0.2 MG: 0.2 INJECTION, SOLUTION INTRAMUSCULAR; INTRAVENOUS; SUBCUTANEOUS at 14:05

## 2025-01-24 RX ADMIN — HYDROMORPHONE HYDROCHLORIDE 0.2 MG: 0.2 INJECTION, SOLUTION INTRAMUSCULAR; INTRAVENOUS; SUBCUTANEOUS at 18:29

## 2025-01-24 RX ADMIN — HYDROMORPHONE HYDROCHLORIDE 0.2 MG: 0.2 INJECTION, SOLUTION INTRAMUSCULAR; INTRAVENOUS; SUBCUTANEOUS at 12:44

## 2025-01-24 RX ADMIN — SODIUM CHLORIDE 1000 ML: 9 INJECTION, SOLUTION INTRAVENOUS at 18:29

## 2025-01-24 RX ADMIN — SODIUM CHLORIDE 36 ML: 9 INJECTION, SOLUTION INTRAVENOUS at 14:46

## 2025-01-24 RX ADMIN — IOPAMIDOL 71 ML: 755 INJECTION, SOLUTION INTRAVENOUS at 14:46

## 2025-01-24 RX ADMIN — HYDROMORPHONE HYDROCHLORIDE 0.5 MG: 1 INJECTION, SOLUTION INTRAMUSCULAR; INTRAVENOUS; SUBCUTANEOUS at 22:06

## 2025-01-24 RX ADMIN — HYDROMORPHONE HYDROCHLORIDE 0.2 MG: 0.2 INJECTION, SOLUTION INTRAMUSCULAR; INTRAVENOUS; SUBCUTANEOUS at 19:47

## 2025-01-24 RX ADMIN — SODIUM CHLORIDE 1000 ML: 9 INJECTION, SOLUTION INTRAVENOUS at 12:45

## 2025-01-24 ASSESSMENT — ACTIVITIES OF DAILY LIVING (ADL)
ADLS_ACUITY_SCORE: 58

## 2025-01-24 NOTE — Clinical Note
The first balloon was inserted into the left anterior descending and distal left anterior descending.Max pressure = 6 lili. Total duration = 10 seconds.     Max pressure = 6 lili. Total duration = 10 seconds.    Balloon reinflated a second time: Max pressure = 6 lili. Total duration = 10 seconds.  Balloon reinflated a third time: Max pressure = 10 lili. Total duration = 6 seconds.  Balloon reinflated a fourth time: Max pressure = 10 lili. To ayush duration = 6 seconds.

## 2025-01-24 NOTE — Clinical Note
dry, intact, no bleeding and no hematoma. 7 Fr sheath removed from the right femoral artery. Arteriotomy closed successfully by physician with an 8 Fr angioseal. Tegaderm.

## 2025-01-24 NOTE — ED TRIAGE NOTES
Patient states he is having pain from surgical site. States left respite site yesterday, states pain has increased this morning which is why he is in.

## 2025-01-24 NOTE — Clinical Note
Ativan  Last Written Prescription Date: 8/2/21  Last Fill Quantity: 30 # of Refills: 1  Last Office Visit: 11/9/21       Balloon removed intact.

## 2025-01-24 NOTE — ED PROVIDER NOTES
[place] EMERGENCY DEPARTMENT ([facility])    1/24/25       ED PROVIDER NOTE     History     Chief Complaint   Patient presents with    Abdominal Pain     Post bowel surgical pain     The history is provided by the patient and medical records.     Andrew Collier is a 59 year old male well-known to the ED for multiple visits due to chronic abdominal pain, GERD as well as prior history of type 2 diabetes, AIDS, PTSD and recent small bowel obstruction, status post surgery complicated by anastomotic leak, is now presenting with abdominal pain, nausea, and vomiting.     Epic records reviewed. Patient had recent hospitalization from 12/20/2024-1/17/2025 for generalized abdominal pain and SBO.    In the ED on 1/21/2025 the after hospital discharge due to problems with his wound.  We were able to get him into a medical respite shelter where he has assistance with dressing changes.  He states he feels he was doing well there until this morning when he got crampy generalized abdominal pain associated with nausea and vomiting.  He does not believe his wound has any drainage and he was able to get assistance with the dressing change today.  No fever or chills.  No urinary symptoms.  No bowel movement since yesterday.    Past Medical History  Past Medical History:   Diagnosis Date    Acute appendicitis with localized peritonitis 01/31/2018    AIDS (H)     Allergic rhinitis due to other allergen     DNS    Anal dysplasia     Chronic abdominal pain     CNS toxoplasmosis (H)     COVID-19 01/11/2022    Diabetes type 2, controlled (H)     GERD (gastroesophageal reflux disease)     Herpes zoster 09/23/2016    History of seizure     History of substance abuse (H)     HIV (human immunodeficiency virus infection) (H)     HLD (hyperlipidemia)     Lung nodules     Periungual wart     Pneumonia 01/06/2019    PTSD (post-traumatic stress disorder)     Sleep apnea     doesn't use CPAP     Past Surgical History:   Procedure  Laterality Date    ANOSCOPY N/A 09/09/2020    Procedure: Exam Under Anesthesia, ANOSCOPY, fulgeration of rectal fissures with Rectal Biopsies;  Surgeon: Thanh Lundberg MD;  Location: [facility] OR    COLONOSCOPY Left 01/22/2016    Procedure: COMBINED COLONOSCOPY, SINGLE OR MULTIPLE BIOPSY/POLYPECTOMY BY BIOPSY;  Surgeon: Clark Saini MD;  Location: [facility] GI    ESOPHAGOSCOPY, GASTROSCOPY, DUODENOSCOPY (EGD), COMBINED N/A 06/06/2022    Procedure: ESOPHAGOGASTRODUODENOSCOPY, WITH BIOPSY;  Surgeon: Kecia Benítez MD;  Location: [facility]    ESOPHAGOSCOPY, GASTROSCOPY, DUODENOSCOPY (EGD), COMBINED N/A 10/10/2024    Procedure: Esophagoscopy, gastroscopy, duodenoscopy (EGD), combined;  Surgeon: Nico Bhatti DO;  Location: [facility] GI    HC EXPLORE UNDESC TESTIS,INGUIN/SCROTAL      LAPAROSCOPIC APPENDECTOMY N/A 01/31/2018    Procedure: LAPAROSCOPIC APPENDECTOMY;  LAPAROSCOPIC APPENDECTOMY;  Surgeon: Dawn Holt MD;  [facility]    LAPAROSCOPY DIAGNOSTIC (GENERAL) N/A 07/26/2016    Procedure: LAPAROSCOPY DIAGNOSTIC (GENERAL);  Surgeon: Susannah Arriaga MD;  [facility]: [facility]    LAPAROSCOPY DIAGNOSTIC (GENERAL) N/A 04/16/2018    Procedure: LAPAROSCOPY DIAGNOSTIC (GENERAL);  Diagnostic laparoscopy and lysis of adhesions;  Surgeon: Pricne Dowling MD;  Location: [facility] OR    OPTICAL TRACKING SYSTEM CRANIOTOMY, EXCISE TUMOR, COMBINED Left 04/10/2015    Procedure: COMBINED OPTICAL TRACKING SYSTEM CRANIOTOMY, EXCISE TUMOR;  Surgeon: Mirlande Colmenares MD;  Location: [facility] OR    PICC INSERTION - TRIPLE LUMEN Right 12/26/2024    Right brachial medial vein 42cm total 4cm external.    REPAIR GAMEKEEPER'S THUMB Right 12/02/2016    Procedure: REPAIR LIGAMENT ULNAR COLLATERAL THUMB (GAMEKEEPER'S);  Surgeon: Evin Zamorano MD;  [facility] OR    RESECT SMALL BOWEL WITHOUT OSTOMY N/A 12/20/2024    Procedure: Laparotomy exploratory with small bowel resection.;  Surgeon: Christiano Obrien MD;  Location: [facility] OR  "   RESECT SMALL BOWEL WITHOUT OSTOMY N/A 12/24/2024    Procedure: EXPLORATORY LAPAROTOMY, SMALL BOWEL RESECTION AND REANASTOMOSIS;  Surgeon: Shabnam Kate MD;  Location:  OR    [facility] NONSPECIFIC PROCEDURE      right forearm fracture     bictegravir-emtricitabine-tenofovir (BIKTARVY) -25 MG per tablet  blood glucose (NO BRAND SPECIFIED) lancets standard  blood glucose (NO BRAND SPECIFIED) test strip  blood glucose monitoring (NO BRAND SPECIFIED) meter device kit  Continuous Glucose Sensor (DEXCOM G6 SENSOR) MISC  Continuous Glucose Sensor (DEXCOM G7 SENSOR) MISC  Continuous Glucose Transmitter (DEXCOM G6 TRANSMITTER) MISC  insulin aspart (NOVOLOG PEN) 100 UNIT/ML pen  insulin glargine (LANTUS PEN) 100 UNIT/ML pen  insulin pen needle (32G X 4 MM) 32G X 4 MM miscellaneous  naproxen (NAPROSYN) 500 MG tablet  Nutritional Supplements (ENSURE ACTIVE) LIQD  oxyCODONE (ROXICODONE) 5 MG tablet  pantoprazole (PROTONIX) 40 MG EC tablet      Allergies   Allergen Reactions    Fentanyl Blisters     Per pt, after taking this medication had blisters develope    Tylenol [Acetaminophen] Itching     12/27/24 Patient has received acetaminophen multiple times since documentation of this reaction, in 2016 patient was given acetaminophen with codeine - more likely that codeine would cause itching    Dulaglutide Rash    Insulin Lispro Rash     Patient reported    Penicillin V Other (See Comments) and Rash     Diffuse maculopapular rash + feels \"high\", per pt.     Profile shows many uses of Zosyn      Family History  Family History   Problem Relation Age of Onset    Diabetes Brother     Diabetes Father     Alzheimer Disease Father     Unknown/Adopted Mother     Diabetes Paternal Grandfather     Cancer No family hx of         no skin cancer    Skin Cancer No family hx of         no famiy hx of skin cancer    Glaucoma No family hx of     Macular Degeneration No family hx of      Social History   Social History     Tobacco Use    " "Smoking status: Some Days     Current packs/day: 0.25     Average packs/day: 0.3 packs/day for 40.0 years (10.0 ttl pk-yrs)     Types: Cigarettes    Smokeless tobacco: Former   Substance Use Topics    Alcohol use: No     Alcohol/week: 0.0 standard drinks of alcohol     Comment: Last etoh in 2007    Drug use: Not Currently     Types: Marijuana     Comment: chart indicated meth use but pt denies; states he does not use drugs      A medically appropriate review of systems was performed with pertinent positives and negatives noted in the HPI, and all other systems negative.    Physical Exam   BP: 119/77  Pulse: (!) 106  Temp: 97.1  F (36.2  C)  Resp: 18  Height: 162.6 cm (5' 4\")  Weight: 66.2 kg (146 lb)  SpO2: 98 %  Physical Exam  Vitals and nursing note reviewed.   Constitutional:       General: He is not in acute distress.     Appearance: Normal appearance. He is not toxic-appearing.   HENT:      Head: Atraumatic.   Eyes:      General: No scleral icterus.     Conjunctiva/sclera: Conjunctivae normal.   Cardiovascular:      Rate and Rhythm: Normal rate.      Heart sounds: Normal heart sounds.   Pulmonary:      Effort: Pulmonary effort is normal. No respiratory distress.      Breath sounds: Normal breath sounds.   Abdominal:      General: Abdomen is protuberant. Bowel sounds are decreased.      Palpations: Abdomen is soft.      Tenderness: There is generalized abdominal tenderness.          Comments: He has generalized tenderness and voluntary guarding   Musculoskeletal:         General: No deformity.      Cervical back: Neck supple.   Skin:     General: Skin is warm.   Neurological:      Mental Status: He is alert.           ED Course, Procedures, & Data     ED Course as of 01/24/25 1846 Fri Jan 24, 2025   1329 Lab work reviewed     Procedures                 Results for orders placed or performed during the hospital encounter of 01/24/25   CT Abdomen Pelvis w Contrast     Status: None    Narrative    EXAM: CT " ABDOMEN PELVIS W CONTRAST  LOCATION: [facility]  DATE: 1/24/2025    INDICATION: Recent SBO, multiple complicated surgeries, now with abdominal pain and nausea  COMPARISON: 12/30/24  TECHNIQUE: CT scan of the abdomen and pelvis was performed following injection of IV contrast. Multiplanar reformats were obtained. Dose reduction techniques were used.  CONTRAST: 71mL Isovue 370    FINDINGS:   LOWER CHEST: Small right pleural effusion. Scattered peripheral groundglass opacities in the lung bases. There is coronary artery disease.    HEPATOBILIARY: Cholelithiasis.    PANCREAS: Normal.    SPLEEN: Normal.    ADRENAL GLANDS: Normal.    KIDNEYS/BLADDER: Circumferential wall thickening of the bladder.    BOWEL: Normal stomach. A loop of small bowel in the midabdomen is distended with a maximum diameter of approximately 4 cm. There is mottled stool in this location. This is in the region of a bowel suture line. More distally the small bowel loops are   normal in caliber.    LYMPH NODES: Normal.    VASCULATURE: Normal.    PELVIC ORGANS: Enlarged prostate gland. A penile prosthesis is present.    OTHER: Subcapsular fluid near the liver dome is 7.7 x 3.2 x 11.8 cm. A smaller collection along the inferior aspect of the liver is 1.1 x 3.9 x 3.2 cm. A small amount of fluid in the left abdomen is 4.0 x 1.2 x 2.3 cm.    MUSCULOSKELETAL: Midline ventral abdominal and pelvic wall surgical defect.      Impression    IMPRESSION:   1.  A small bowel obstruction at the suture line is more distended than on the prior study.  2.  Subcapsular fluid along the liver and a small collection in the left abdomen. The fluid along the liver is large enough to drain.  3.  Circumferential wall thickening of the bladder may be from underdistention or cystitis.     Comprehensive metabolic panel     Status: Abnormal   Result Value Ref Range    Sodium 133 (L) 135 - 145 mmol/L    Potassium 4.2 3.4 - 5.3 mmol/L     Carbon Dioxide (CO2) 23 22 - 29 mmol/L    Anion Gap 12 7 - 15 mmol/L    Urea Nitrogen 12.9 8.0 - 23.0 mg/dL    Creatinine 0.61 (L) 0.67 - 1.17 mg/dL    GFR Estimate >90 >60 mL/min/1.73m2    Calcium 8.7 (L) 8.8 - 10.4 mg/dL    Chloride 98 98 - 107 mmol/L    Glucose 143 (H) 70 - 99 mg/dL    Alkaline Phosphatase 298 (H) 40 - 150 U/L    AST 31 0 - 45 U/L    ALT 26 0 - 70 U/L    Protein Total 7.5 6.4 - 8.3 g/dL    Albumin 3.1 (L) 3.5 - 5.2 g/dL    Bilirubin Total 0.3 <=1.2 mg/dL   Lactic acid whole blood with 1x repeat in 2 hr when >2     Status: Normal   Result Value Ref Range    Lactic Acid, Initial 1.4 0.7 - 2.0 mmol/L   Lipase     Status: Normal   Result Value Ref Range    Lipase 39 13 - 60 U/L   UA with Microscopic reflex to Culture     Status: Abnormal    Specimen: Urine, Midstream   Result Value Ref Range    Color Urine Light Yellow Colorless, Straw, Light Yellow, Yellow    Appearance Urine Clear Clear    Glucose Urine Negative Negative mg/dL    Bilirubin Urine Negative Negative    Ketones Urine Negative Negative mg/dL    Specific Gravity Urine 1.026 1.003 - 1.035    Blood Urine Negative Negative    pH Urine 6.0 5.0 - 7.0    Protein Albumin Urine 20 (A) Negative mg/dL    Urobilinogen Urine Normal Normal, 2.0 mg/dL    Nitrite Urine Negative Negative    Leukocyte Esterase Urine Negative Negative    Mucus Urine Present (A) None Seen /LPF    RBC Urine 2 <=2 /HPF    WBC Urine 1 <=5 /HPF    Narrative    Urine Culture not indicated   CBC with platelets and differential     Status: Abnormal   Result Value Ref Range    WBC Count 9.0 4.0 - 11.0 10e3/uL    RBC Count 3.07 (L) 4.40 - 5.90 10e6/uL    Hemoglobin 8.2 (L) 13.3 - 17.7 g/dL    Hematocrit 25.7 (L) 40.0 - 53.0 %    MCV 84 78 - 100 fL    MCH 26.7 26.5 - 33.0 pg    MCHC 31.9 31.5 - 36.5 g/dL    RDW 14.6 10.0 - 15.0 %    Platelet Count 450 150 - 450 10e3/uL    % Neutrophils 71 %    % Lymphocytes 17 %    % Monocytes 9 %    % Eosinophils 2 %    % Basophils 1 %    %  Immature Granulocytes 1 %    NRBCs per 100 WBC 0 <1 /100    Absolute Neutrophils 6.4 1.6 - 8.3 10e3/uL    Absolute Lymphocytes 1.5 0.8 - 5.3 10e3/uL    Absolute Monocytes 0.8 0.0 - 1.3 10e3/uL    Absolute Eosinophils 0.2 0.0 - 0.7 10e3/uL    Absolute Basophils 0.1 0.0 - 0.2 10e3/uL    Absolute Immature Granulocytes 0.1 <=0.4 10e3/uL    Absolute NRBCs 0.0 10e3/uL   CBC with platelets differential     Status: Abnormal    Narrative    The following orders were created for panel order CBC with platelets differential.  Procedure                               Abnormality         Status                     ---------                               -----------         ------                     CBC with platelets and d...[[id number]]  Abnormal            Final result                 Please view results for these tests on the individual orders.     Medications   ondansetron (ZOFRAN) injection 4 mg (has no administration in time range)   sodium chloride 0.9% BOLUS 1,000 mL (1,000 mLs Intravenous $New Bag 1/24/25 5862)   sodium chloride 0.9% BOLUS 1,000 mL (0 mLs Intravenous Stopped 1/24/25 1524)   HYDROmorphone (DILAUDID) injection 0.2 mg (0.2 mg Intravenous $Given 1/24/25 1244)   HYDROmorphone (DILAUDID) injection 0.2 mg (0.2 mg Intravenous $Given 1/24/25 1405)   iopamidol (ISOVUE-370) solution 100 mL (71 mLs Intravenous $Given 1/24/25 1446)   sodium chloride 0.9 % bag 100mL (36 mLs Intravenous $Given 1/24/25 1446)   HYDROmorphone (DILAUDID) injection 0.2 mg (0.2 mg Intravenous $Given 1/24/25 1829)     Labs Ordered and Resulted from Time of ED Arrival to Time of ED Departure   COMPREHENSIVE METABOLIC PANEL - Abnormal       Result Value    Sodium 133 (*)     Potassium 4.2      Carbon Dioxide (CO2) 23      Anion Gap 12      Urea Nitrogen 12.9      Creatinine 0.61 (*)     GFR Estimate >90      Calcium 8.7 (*)     Chloride 98      Glucose 143 (*)     Alkaline Phosphatase 298 (*)     AST 31      ALT 26      Protein Total 7.5       Albumin 3.1 (*)     Bilirubin Total 0.3     ROUTINE UA WITH MICROSCOPIC REFLEX TO CULTURE - Abnormal    Color Urine Light Yellow      Appearance Urine Clear      Glucose Urine Negative      Bilirubin Urine Negative      Ketones Urine Negative      Specific Gravity Urine 1.026      Blood Urine Negative      pH Urine 6.0      Protein Albumin Urine 20 (*)     Urobilinogen Urine Normal      Nitrite Urine Negative      Leukocyte Esterase Urine Negative      Mucus Urine Present (*)     RBC Urine 2      WBC Urine 1     CBC WITH PLATELETS AND DIFFERENTIAL - Abnormal    WBC Count 9.0      RBC Count 3.07 (*)     Hemoglobin 8.2 (*)     Hematocrit 25.7 (*)     MCV 84      MCH 26.7      MCHC 31.9      RDW 14.6      Platelet Count 450      % Neutrophils 71      % Lymphocytes 17      % Monocytes 9      % Eosinophils 2      % Basophils 1      % Immature Granulocytes 1      NRBCs per 100 WBC 0      Absolute Neutrophils 6.4      Absolute Lymphocytes 1.5      Absolute Monocytes 0.8      Absolute Eosinophils 0.2      Absolute Basophils 0.1      Absolute Immature Granulocytes 0.1      Absolute NRBCs 0.0     LACTIC ACID WHOLE BLOOD WITH 1X REPEAT IN 2 HR WHEN >2 - Normal    Lactic Acid, Initial 1.4     LIPASE - Normal    Lipase 39     BLOOD CULTURE   BLOOD CULTURE     CT Abdomen Pelvis w Contrast   Final Result   IMPRESSION:    1.  A small bowel obstruction at the suture line is more distended than on the prior study.   2.  Subcapsular fluid along the liver and a small collection in the left abdomen. The fluid along the liver is large enough to drain.   3.  Circumferential wall thickening of the bladder may be from underdistention or cystitis.                Critical care was not performed.     Medical Decision Making  The patient's presentation was of high complexity (a chronic illness severe exacerbation, progression, or side effect of treatment).    The patient's evaluation involved:  review of external note(s) from 3+ sources  (reviewed multiple notes from recent hospital admission, recent operative notes,)  review of 3+ test result(s) ordered prior to this encounter (review of prior labs and CT for comparison)  ordering and/or review of 3+ test(s) in this encounter (see separate area of note for details)  independent interpretation of testing performed by another health professional (CT abdomen pelvis images personally reviewed and reviewed radiologist interpretation)  discussion of management or test interpretation with another health professional (discussed with surgical staff on-call)    The patient's management necessitated moderate risk (prescription drug management including medications given in the ED), moderate risk (IV contrast administration), high risk (a parenteral controlled substance), high risk (a decision regarding hospitalization), and further care after sign-out to Dr De Paz (see their note for further management).    Assessment & Plan    A 59-year-old male with history of HIV, recent small bowel obstruction with operative repair complicated by anastomotic leak and open abdominal wound.  Patient had been doing better today developed diffuse crampy abdominal pain with vomiting and has not had a bowel movement for more than 24 hours.  Exam today his vitals are stable but he appears uncomfortable.  His mucous members are dry.  He is given IV fluids, IV pain meds, IV antiemetics and a workup revealed baseline hemoglobin no significant leukocytosis, no significant electrolyte disturbance.  CT suggests fluid collection along the liver and small bowel obstruction.  His serial exam remains unchanged he actually reports improvement in his pain and nausea.  I discussed the case with the surgical attending on-call who would like the patient transferred to the [facility] ED for urgent surgical consultation and assistance with further disposition.  I have spoken with the ED physician Dr. De Paz who accept the patient in transfer pending  surgical consult.  Patient is agreeable stable for transfer    I have reviewed the nursing notes. I have reviewed the findings, diagnosis, plan and need for follow up with the patient.    New Prescriptions    No medications on file       Final diagnoses:   Small bowel obstruction (H)       Aguila Beckman MD  [facility] EMERGENCY DEPARTMENT  1/24/2025     Aguila Beckman MD  01/24/25 3236

## 2025-01-24 NOTE — Clinical Note
Stent deployed in the distal left anterior descending. Max pressure = 11 lili. Total duration = 8 seconds.

## 2025-01-24 NOTE — LETTER
Transition Communication Hand-off for Care Transitions to Next Level of Care Provider    Name: Andrew Collier  : 1965  MRN #: [id number]  Primary Care Provider: Rani Hernandez     Primary Clinic: [address] [facility] [address]  [place] [address]     Reason for Hospitalization:  Small bowel obstruction (H) [K56.609]  Admit Date/Time: 2025 11:27 AM  Discharge Date: 2/3/2025  Payor Source: Payor: [employer] / Plan: [employer] PMAP AND [employer] / Product Type: Indemnity /     Concern for non-adherence with plan of care:   no  Discharge Needs Assessment:  Needs      Flowsheet Row Most Recent Value   Equipment Currently Used at Home none   # of Referrals Placed by  Homecare              Follow-up plan:    Future Appointments   Date Time Provider Department Center   2025  1:30 PM Celeste Pelaez PA-C [facility]    2025 11:30 AM Rani Hernandez MD [facility]    3/13/2025  7:30 AM  LAB [facility]   3/13/2025  8:30 AM Benita Ivory DO [facility]       Any outstanding tests or procedures:        Referrals       Future Labs/Procedures    Primary Care - Care Coordination Referral     Process Instructions:    Services are provided by a Care Coordinator for people with complex needs such as: medical, social, or financial troubles.  The Care Coordinator works with the patient and their Primary Care Provider to determine health goals, obtain resources, achieve outcomes, and develop care plans that help coordinate the patient's care.     Comments:         Follow-Up with Cardiology     Comments:    [facility] will call you to coordinate your care as prescribed by your provider. If you have concerns about scheduling, please call [phone].      Home Care Referral     Comments:    Your provider has ordered home health services. If you have not been contacted within 2 days of your discharge please call [facility] Home Care RN.  [phone].   Please discharge  orders for wound cares and supplies needed.              Key Recommendations:      RADHA Cuellar    AVS/Discharge Summary is the source of truth; this is a helpful guide for improved communication of patient story

## 2025-01-24 NOTE — Clinical Note
The first balloon was inserted into the left anterior descending and proximal left anterior descending.Max pressure = 12 lili. Total duration = 6 seconds.     Max pressure = 12 lili. Total duration = 5 seconds.    Balloon reinflated a second time: Max pressure = 12 lili. Total duration = 5 seconds.

## 2025-01-24 NOTE — Clinical Note
The first balloon was inserted into the left anterior descending and proximal left anterior descending.Max pressure = 12 lili. Total duration = 6 seconds.     Max pressure = 16 lili. Total duration = 6 seconds.    Balloon reinflated a second time: Max pressure = 16 lili. Total duration = 6 seconds.

## 2025-01-24 NOTE — Clinical Note
The first balloon was inserted into the left anterior descending and distal left anterior descending.Max pressure = 12 lili. Total duration = 6 seconds.     Max pressure = 12 lili. Total duration = 4 seconds.    Balloon reinflated a second time: Max pressure = 12 lili. Total duration = 4 seconds.  Balloon reinflated a third time: Max pressure = 12 lili. Total duration = 4 seconds.

## 2025-01-24 NOTE — Clinical Note
The first balloon was inserted into the left anterior descending and distal left anterior descending.Max pressure = 12 lili. Total duration = 8 seconds.     Max pressure = 12 lili. Total duration = 6 seconds.    Balloon reinflated a second time: Max pressure = 12 lili. Total duration = 6 seconds.  Balloon reinflated a third time: Max pressure = 12 lili. Total duration = 5 seconds.  Balloon reinflated a fourth time: Max pressure = 12 lili. To ayush duration = 6 seconds.

## 2025-01-24 NOTE — Clinical Note
Called to Yuridia ELLSWORTH on 6C. All questions answered. All belongings sent with patient. Patient transported to 6C via stretcher with RONALD RN on a monitor.

## 2025-01-24 NOTE — Clinical Note
Stent deployed in the proximal left anterior descending. Max pressure = 11 lili. Total duration = 8 seconds.

## 2025-01-24 NOTE — Clinical Note
The first balloon was inserted into the left anterior descending and proximal left anterior descending.Max pressure = 12 lili. Total duration = 6 seconds.     Max pressure = 16 lili. Total duration = 8 seconds.    Balloon reinflated a second time: Max pressure = 16 lili. Total duration = 8 seconds.  Balloon reinflated a third time: Max pressure = 16 lili. Total duration = 7 seconds.  Balloon reinflated a fourth time: Max pressure = 16 lili.  Total duration = 6 seconds.

## 2025-01-24 NOTE — Clinical Note
; reported to MD and team. [FreeTextEntry1] : 1) Defer Tdap until 1 week 2) Excellent exercise and diet plan 3) Pregnancy planning Medical Genetics F/U Breast CA BRCA screening 4) tired after 12 hour shift  Safe driving Going to [place] Will get Tdap after trip

## 2025-01-24 NOTE — Clinical Note
The first balloon was inserted into the left anterior descending and proximal left anterior descending.Max pressure = 12 lili. Total duration = 20 seconds.     Max pressure = 12 lili. Total duration = 8 seconds.    Balloon reinflated a second time: Max pressure = 12 lili. Total duration = 8 seconds.

## 2025-01-25 LAB
ANION GAP SERPL CALCULATED.3IONS-SCNC: 8 MMOL/L (ref 7–15)
BUN SERPL-MCNC: 7.4 MG/DL (ref 8–23)
CALCIUM SERPL-MCNC: 8.4 MG/DL (ref 8.8–10.4)
CHLORIDE SERPL-SCNC: 103 MMOL/L (ref 98–107)
CREAT SERPL-MCNC: 0.56 MG/DL (ref 0.67–1.17)
EGFRCR SERPLBLD CKD-EPI 2021: >90 ML/MIN/1.73M2
ERYTHROCYTE [DISTWIDTH] IN BLOOD BY AUTOMATED COUNT: 14.6 % (ref 10–15)
GLUCOSE BLDC GLUCOMTR-MCNC: 116 MG/DL (ref 70–99)
GLUCOSE BLDC GLUCOMTR-MCNC: 118 MG/DL (ref 70–99)
GLUCOSE BLDC GLUCOMTR-MCNC: 119 MG/DL (ref 70–99)
GLUCOSE BLDC GLUCOMTR-MCNC: 160 MG/DL (ref 70–99)
GLUCOSE BLDC GLUCOMTR-MCNC: 80 MG/DL (ref 70–99)
GLUCOSE BLDC GLUCOMTR-MCNC: 98 MG/DL (ref 70–99)
GLUCOSE SERPL-MCNC: 116 MG/DL (ref 70–99)
HCO3 SERPL-SCNC: 23 MMOL/L (ref 22–29)
HCT VFR BLD AUTO: 24.5 % (ref 40–53)
HGB BLD-MCNC: 8 G/DL (ref 13.3–17.7)
MCH RBC QN AUTO: 26.7 PG (ref 26.5–33)
MCHC RBC AUTO-ENTMCNC: 32.7 G/DL (ref 31.5–36.5)
MCV RBC AUTO: 82 FL (ref 78–100)
PLATELET # BLD AUTO: 437 10E3/UL (ref 150–450)
POTASSIUM SERPL-SCNC: 3.8 MMOL/L (ref 3.4–5.3)
RBC # BLD AUTO: 3 10E6/UL (ref 4.4–5.9)
SODIUM SERPL-SCNC: 134 MMOL/L (ref 135–145)
WBC # BLD AUTO: 8.7 10E3/UL (ref 4–11)

## 2025-01-25 PROCEDURE — 250N000013 HC RX MED GY IP 250 OP 250 PS 637

## 2025-01-25 PROCEDURE — 82962 GLUCOSE BLOOD TEST: CPT

## 2025-01-25 PROCEDURE — 250N000011 HC RX IP 250 OP 636: Performed by: NURSE PRACTITIONER

## 2025-01-25 PROCEDURE — 99207 PR APP CREDIT; MD BILLING SHARED VISIT: CPT | Performed by: PHYSICIAN ASSISTANT

## 2025-01-25 PROCEDURE — 85041 AUTOMATED RBC COUNT: CPT | Performed by: NURSE PRACTITIONER

## 2025-01-25 PROCEDURE — 120N000002 HC R&B MED SURG/OB UMMC

## 2025-01-25 PROCEDURE — 999N000157 HC STATISTIC RCP TIME EA 10 MIN

## 2025-01-25 PROCEDURE — 99221 1ST HOSP IP/OBS SF/LOW 40: CPT | Mod: 4UV | Performed by: RADIOLOGY

## 2025-01-25 PROCEDURE — 250N000012 HC RX MED GY IP 250 OP 636 PS 637: Performed by: NURSE PRACTITIONER

## 2025-01-25 PROCEDURE — 99233 SBSQ HOSP IP/OBS HIGH 50: CPT | Mod: 24 | Performed by: SURGERY

## 2025-01-25 PROCEDURE — 258N000003 HC RX IP 258 OP 636: Performed by: NURSE PRACTITIONER

## 2025-01-25 PROCEDURE — 36415 COLL VENOUS BLD VENIPUNCTURE: CPT | Performed by: NURSE PRACTITIONER

## 2025-01-25 PROCEDURE — 99232 SBSQ HOSP IP/OBS MODERATE 35: CPT | Mod: FS | Performed by: HOSPITALIST

## 2025-01-25 PROCEDURE — 80048 BASIC METABOLIC PNL TOTAL CA: CPT | Performed by: NURSE PRACTITIONER

## 2025-01-25 PROCEDURE — 85018 HEMOGLOBIN: CPT | Performed by: NURSE PRACTITIONER

## 2025-01-25 RX ORDER — BISACODYL 10 MG
10 SUPPOSITORY, RECTAL RECTAL DAILY
Status: DISCONTINUED | OUTPATIENT
Start: 2025-01-25 | End: 2025-01-26

## 2025-01-25 RX ORDER — SIMETHICONE 80 MG
80 TABLET,CHEWABLE ORAL EVERY 6 HOURS PRN
Status: DISCONTINUED | OUTPATIENT
Start: 2025-01-25 | End: 2025-01-28

## 2025-01-25 RX ADMIN — HYDROMORPHONE HYDROCHLORIDE 0.8 MG: 1 INJECTION, SOLUTION INTRAMUSCULAR; INTRAVENOUS; SUBCUTANEOUS at 09:21

## 2025-01-25 RX ADMIN — HYDROMORPHONE HYDROCHLORIDE 0.8 MG: 1 INJECTION, SOLUTION INTRAMUSCULAR; INTRAVENOUS; SUBCUTANEOUS at 15:57

## 2025-01-25 RX ADMIN — BISACODYL 10 MG: 10 SUPPOSITORY RECTAL at 11:33

## 2025-01-25 RX ADMIN — INSULIN GLARGINE 15 UNITS: 100 INJECTION, SOLUTION SUBCUTANEOUS at 11:21

## 2025-01-25 RX ADMIN — HYDROMORPHONE HYDROCHLORIDE 0.8 MG: 1 INJECTION, SOLUTION INTRAMUSCULAR; INTRAVENOUS; SUBCUTANEOUS at 13:42

## 2025-01-25 RX ADMIN — HYDROMORPHONE HYDROCHLORIDE 1 MG: 1 INJECTION, SOLUTION INTRAMUSCULAR; INTRAVENOUS; SUBCUTANEOUS at 19:07

## 2025-01-25 RX ADMIN — SODIUM CHLORIDE: 9 INJECTION, SOLUTION INTRAVENOUS at 00:36

## 2025-01-25 RX ADMIN — SODIUM CHLORIDE: 9 INJECTION, SOLUTION INTRAVENOUS at 13:42

## 2025-01-25 RX ADMIN — HYDROMORPHONE HYDROCHLORIDE 0.8 MG: 1 INJECTION, SOLUTION INTRAMUSCULAR; INTRAVENOUS; SUBCUTANEOUS at 00:16

## 2025-01-25 RX ADMIN — HYDROMORPHONE HYDROCHLORIDE 0.8 MG: 1 INJECTION, SOLUTION INTRAMUSCULAR; INTRAVENOUS; SUBCUTANEOUS at 17:39

## 2025-01-25 RX ADMIN — SIMETHICONE 80 MG: 80 TABLET, CHEWABLE ORAL at 11:23

## 2025-01-25 RX ADMIN — HYDROMORPHONE HYDROCHLORIDE 0.8 MG: 1 INJECTION, SOLUTION INTRAMUSCULAR; INTRAVENOUS; SUBCUTANEOUS at 21:17

## 2025-01-25 ASSESSMENT — ACTIVITIES OF DAILY LIVING (ADL)
ADLS_ACUITY_SCORE: 58

## 2025-01-25 NOTE — ED PROVIDER NOTES
Patient transferred from [facility] ED for Gen Surg consult for SBO, otherwise stable. See Dr. Beckman's note for more details.    Patient remained stable in the emergency department.  General surgery was consulted and patient was admitted to medicine.  Hospitalist is quite familiar with this patient.  Recommends initiating on observation status.     Osbaldo Gottlieb MD  01/25/25 031

## 2025-01-25 NOTE — ED NOTES
Writer told pt that RT would be coming to set up CPAP machine but patient told writer not to call, says he does not use CPAP and does not need it now.

## 2025-01-25 NOTE — ED TRIAGE NOTES
PT from [facility] [place] [facility] for SBO and surgery consult.     Triage Assessment (Adult)       Row Name 01/24/25 1123          Triage Assessment    Airway WDL WDL        Respiratory WDL    Respiratory WDL WDL        Skin Circulation/Temperature WDL    Skin Circulation/Temperature WDL X  wound to stomach, triage RN did not visualize        Cardiac WDL    Cardiac WDL WDL        Peripheral/Neurovascular WDL    Peripheral Neurovascular WDL WDL        Cognitive/Neuro/Behavioral WDL    Cognitive/Neuro/Behavioral WDL WDL

## 2025-01-25 NOTE — PROGRESS NOTES
[facility]    Medicine Progress Note - Hospitalist Service, GOLD TEAM 6    Date of Admission:  1/24/2025    Assessment & Plan   Andrew Collier is a 59 year old man admitted on 1/24/2025. He has a history of chronic abdominal pain, DM II, AIDS, PTSD and SBO s/p ex lap w/ small bowel resection on 12/20 with return to OR on 12/24 for anastomotic leak.  He is admitted with recurrent small bowel obstruction.    Interim history   Patient did have a very small BM this morning but this afternoon was experiencing abdominal pain again.     Plan   NPO   Fluids  Pain control      # Recurrent SBO   # SBO s/p ex lap w/ small bowel resection on 12/20 with return to OR on 12/24 for anastomotic leak.   The patient had a SBO in December of last year and underwent small bowel resection on 12/20 with return to the OR on 12/24 for an anastomotic leak.  He is now readmitted with nausea, vomiting and recurrent SBO on CT scan.  - Will hold off on NG tube for now as he has no nausea or vomiting  - General surgery already consulted, appreciate involvement  - NS @ 75 ccs/hjr  - Hydromorphone PRN pain  - NPO     # History of DM II  - Continue home lantus, will decrease dose to 15 units daily  - Continue sliding scale insulin     # Abdominal wound 2/2 above   - woc consult    # History of AIDS  - Resume home Biktarvy once able to take PO    # IR Drain for hepatic lesions.  - IR consulted no indication for drainage at this time given reduction in size without antibiotics     # HIV   Continue Bictarvy           Diet: NPO for Medical/Clinical Reasons Except for: Meds, Ice Chips    DVT Prophylaxis: scds  Potts Catheter: Not present  Lines: None     Cardiac Monitoring: None  Code Status: Full Code      Clinically Significant Risk Factors Present on Admission         # Hyponatremia: Lowest Na = 133 mmol/L in last 2 days, will monitor as appropriate       # Hypoalbuminemia: Lowest albumin = 3.1  "g/dL at 1/24/2025 12:37 PM, will monitor as appropriate          # Anemia: based on hgb <11      # DMII: A1C = 10.0 % (Ref range: <5.7 %) within past 6 months   # Overweight: Estimated body mass index is 25.06 kg/m  as calculated from the following:    Height as of this encounter: 1.626 m (5' 4\").    Weight as of this encounter: 66.2 kg (146 lb).       # Financial/Environmental Concerns:    # Support System: poor social support noted in nursing assessment  # Housing Instability: noted in nursing assessment         Social Drivers of Health    Food Insecurity: Low Risk  (12/21/2024)    Food Insecurity     Within the past 12 months, did you worry that your food would run out before you got money to buy more?: No     Within the past 12 months, did the food you bought just not last and you didn t have money to get more?: No   Recent Concern: Food Insecurity - High Risk (10/24/2024)    Food Insecurity     Within the past 12 months, did you worry that your food would run out before you got money to buy more?: Yes     Within the past 12 months, did the food you bought just not last and you didn t have money to get more?: Yes   Housing Stability: High Risk (12/21/2024)    Housing Stability     Do you have housing? : No     Are you worried about losing your housing?: Yes   Tobacco Use: High Risk (1/21/2025)    Patient History     Smoking Tobacco Use: Some Days     Smokeless Tobacco Use: Former   Financial Resource Strain: High Risk (12/21/2024)    Financial Resource Strain     Within the past 12 months, have you or your family members you live with been unable to get utilities (heat, electricity) when it was really needed?: Yes   Alcohol Use: Unknown (8/27/2020)    AUDIT-C     Average Number of Drinks: Patient declined   Transportation Needs: High Risk (12/21/2024)    Transportation Needs     Within the past 12 months, has lack of transportation kept you from medical appointments, getting your medicines, non-medical meetings " or appointments, work, or from getting things that you need?: Yes   Interpersonal Safety: Low Risk  (12/21/2024)    Interpersonal Safety     Do you feel physically and emotionally safe where you currently live?: Yes     Within the past 12 months, have you been hit, slapped, kicked or otherwise physically hurt by someone?: No     Within the past 12 months, have you been humiliated or emotionally abused in other ways by your partner or ex-partner?: No   Recent Concern: Interpersonal Safety - High Risk (10/24/2024)    Interpersonal Safety     Do you feel physically and emotionally safe where you currently live?: No     Within the past 12 months, have you been hit, slapped, kicked or otherwise physically hurt by someone?: No     Within the past 12 months, have you been humiliated or emotionally abused in other ways by your partner or ex-partner?: No    Received from [facility], [facility]    Social Connections          Disposition Plan     Medically Ready for Discharge: Anticipated in 2-4 Days           The patient's care was discussed with the medicine attending    TIFFANI RendonC  Hospitalist Service, GOLD TEAM [address] [place] [address]  Securely message with [email or web address] (more info)  Text page via [facility] Paging/Directory   See signed in provider for up to date coverage information  ______________________________________________________________________    Interval History   See above     Physical Exam   Vital Signs: Temp: 98.4  F (36.9  C) Temp src: Oral BP: 133/80 Pulse: 89   Resp: 18 SpO2: 97 % O2 Device: None (Room air)    Weight: 146 lbs 0 oz    General Appearance: Alert, no acute distress   HEENT: Anicteric sclera, MMM   Respiratory: Lungs CTAB, No wheezing , no crackles,   Cardiovascular: Regular rate , Regular rhythm , no murmur   GI: Abdomen soft, non-tender, active bowel sounds. No guarding or  rebound  Skin: No rash or jaundice on exposed skin   Musculoskeletal: No lower extremity edema  Neurologic: Oriented X 3 , CN II-XII grossly intact, Moves extremities equally       Medical Decision Making       60 MINUTES SPENT BY ME on the date of service doing chart review, history, exam, documentation & further activities per the note.      Data   ------------------------- PAST 24 HR DATA REVIEWED -----------------------------------------------

## 2025-01-25 NOTE — CONSULTS
"  INTERVENTIONAL RADIOLOGY CONSULT NOTE    Reason for referral:   Perihepatic fluid collection    History:   Andrew Collier is a 59 year old man admitted on 1/24/2025. He has a history of chronic abdominal pain, DM II, AIDS, PTSD and SBO s/p ex lap w/ small bowel resection on 12/20 with return to OR on 12/24 for anastomotic leak.  He is admitted with recurrent small bowel obstruction at his anastomotic line on CT, additionally he was noted to have a perihepatic fluid collection on CT.      Labs:  Lab Results   Component Value Date    HGB 8.0 01/25/2025    HGB 13.5 06/24/2021     Lab Results   Component Value Date     01/25/2025     06/24/2021     Lab Results   Component Value Date    WBC 8.7 01/25/2025    WBC 9.6 06/24/2021       Lab Results   Component Value Date    INR 1.18 01/20/2025    INR 1.00 12/19/2020       Lab Results   Component Value Date    PROTTOTAL 7.5 01/24/2025    PROTTOTAL 5.9 06/24/2021      Lab Results   Component Value Date    ALBUMIN 3.1 01/24/2025    ALBUMIN 3.3 02/01/2023    ALBUMIN 2.9 06/24/2021     Lab Results   Component Value Date    BILITOTAL 0.3 01/24/2025    BILITOTAL 0.4 06/24/2021     No results found for: \"BILICONJ\"   Lab Results   Component Value Date    ALKPHOS 298 01/24/2025    ALKPHOS 98 06/24/2021     Lab Results   Component Value Date    AST 31 01/24/2025    AST 12 06/24/2021     Lab Results   Component Value Date    ALT 26 01/24/2025    ALT 20 06/24/2021       Lab Results   Component Value Date    CR 0.56 01/25/2025    CR 0.81 06/24/2021     Lab Results   Component Value Date    BUN 7.4 01/25/2025    BUN 22 02/01/2023    BUN 18 06/24/2021       Imaging:   CT 1/24 with reduced size of perihepatic fluid from 12/30 1/24/2025 left and 12/30/2024 right      1/24/2025 left and 12/30/2024 right      Assessment:   Andrew Collier is a 59 year old man admitted on 1/24/2025. He has a history of chronic abdominal pain, DM II, AIDS, PTSD and SBO s/p ex " lap w/ small bowel resection on 12/20 with return to OR on 12/24 for anastomotic leak.  He is admitted with recurrent small bowel obstruction at his anastomotic line on CT, additionally he was noted to have a perihepatic fluid collection on CT. Normal white count and afebrile. Does not appear infected per primary, was not on ABX PTA per med list. Appears to be seroma slowly resorbing in size. Drainage would likely be transpleural due to high position at the dome of the diaphragm increasing risk of a procedure for someone who does not appear infected.     Plan:   - Given that collection is reducing in size without antibiotics, patient does not appear infected and location would require trans-pleural drainage the benefits of drainage do not outweigh the risks.  - Ir will not offer drain placement at this time.  - If the patient becomes clinically infected please re-consult IR as this would change the risk benefit analysis.     Matt Colvin MD    Case discussed with staff Damien Umaña MD

## 2025-01-25 NOTE — H&P
"[facility]    History and Physical - Hospitalist Service, GOLD TEAM        Date of Admission:  1/24/2025    Assessment & Plan      Andrew Collier is a 59 year old man admitted on 1/24/2025. He has a history of chronic abdominal pain, DM II, AIDS, PTSD and SBO s/p ex lap w/ small bowel resection on 12/20 with return to OR on 12/24 for anastomotic leak.  He is admitted with recurrent small bowel obstruction.    #) Recurrent SBO - The patient had a SBO in December of last year and underwent small bowel resection on 12/20 with return to the OR on 12/24 for an anastomotic leak.  He is now readmitted with nausea, vomiting and recurrent SBO on CT scan.  - Will hold off on NG tube for now as he has no nausea or vomiting  - General surgery already consulted, appreciate involvement  - NS @ 75 ccs/hjr  - Hydromorphone PRN pain  - NPO    #) History of DM II  - Continue home lantus, will decrease dose to 15 units daily  - Continue sliding scale insulin    #) History of AIDS  - Resume home Biktarvy once able to take PO        Diet: NPO for Medical/Clinical Reasons Except for: Meds    DVT Prophylaxis: Pneumatic Compression Devices  Potts Catheter: Not present  Lines: None     Cardiac Monitoring: None  Code Status:  Full    Clinically Significant Risk Factors Present on Admission         # Hyponatremia: Lowest Na = 133 mmol/L in last 2 days, will monitor as appropriate       # Hypoalbuminemia: Lowest albumin = 3.1 g/dL at 1/24/2025 12:37 PM, will monitor as appropriate          # Anemia: based on hgb <11      # DMII: A1C = 10.0 % (Ref range: <5.7 %) within past 6 months   # Overweight: Estimated body mass index is 25.06 kg/m  as calculated from the following:    Height as of this encounter: 1.626 m (5' 4\").    Weight as of this encounter: 66.2 kg (146 lb).       # Financial/Environmental Concerns:    # Support System: poor social support noted in nursing assessment  # Housing " Instability: noted in nursing assessment         Disposition Plan     Medically Ready for Discharge: Anticipated in 2-4 Days         The patient's care was discussed with the Attending Physician, Dr. Hoyos .    VIKASH Daniels CNP  Hospitalist Service, [facility]  Securely message with [employer] (more info)  Text page via [facility] Paging/Directory   See signed in provider for up to date coverage information    ______________________________________________________________________    Chief Complaint   Abdominal pain, nausea and vomiting    History is obtained from the patient    History of Present Illness   Andrew Collier is a 59 year old man admitted on 1/24/2025. He has a history of chronic abdominal pain, DM II, AIDS, PTSD and SBO s/p ex lap w/ small bowel resection on 12/20 with return to OR on 12/24 for anastomotic leak.  He is admitted with recurrent small bowel obstruction.    The patient tells me he has had consistent abdominal pain since discharge from his last hospital stay.  Our documentation says that he presented yesterday with complaints of worsened abdominal pain, but the patient tells me he actually came in because he had an episode of hypoglycemia.  I do not see this supported in our documentation.  He also tells me his last bowel movement was two days ago, but patient outreach notes from today suggest his last bowel movement was actually today.    He tells me he has gas pain throughout his abdomen.  He denies any nausea, vomiting or fevers.      Past Medical History    Past Medical History:   Diagnosis Date    Acute appendicitis with localized peritonitis 01/31/2018    AIDS (H)     Allergic rhinitis due to other allergen     DNS    Anal dysplasia     Chronic abdominal pain     CNS toxoplasmosis (H)     COVID-19 01/11/2022    Diabetes type 2, controlled (H)     GERD (gastroesophageal reflux disease)     Herpes zoster 09/23/2016     History of seizure     History of substance abuse (H)     HIV (human immunodeficiency virus infection) (H)     HLD (hyperlipidemia)     Lung nodules     Periungual wart     Pneumonia 01/06/2019    PTSD (post-traumatic stress disorder)     Sleep apnea     doesn't use CPAP       Past Surgical History   Past Surgical History:   Procedure Laterality Date    ANOSCOPY N/A 09/09/2020    Procedure: Exam Under Anesthesia, ANOSCOPY, fulgeration of rectal fissures with Rectal Biopsies;  Surgeon: Thanh Lundberg MD;  Location: [facility] OR    COLONOSCOPY Left 01/22/2016    Procedure: COMBINED COLONOSCOPY, SINGLE OR MULTIPLE BIOPSY/POLYPECTOMY BY BIOPSY;  Surgeon: Clark Saini MD;  Location: [facility] GI    ESOPHAGOSCOPY, GASTROSCOPY, DUODENOSCOPY (EGD), COMBINED N/A 06/06/2022    Procedure: ESOPHAGOGASTRODUODENOSCOPY, WITH BIOPSY;  Surgeon: Kecia Benítez MD;  Location: [facility]    ESOPHAGOSCOPY, GASTROSCOPY, DUODENOSCOPY (EGD), COMBINED N/A 10/10/2024    Procedure: Esophagoscopy, gastroscopy, duodenoscopy (EGD), combined;  Surgeon: Nico Bhatti DO;  Location: [facility]    HC EXPLORE UNDESC TESTIS,INGUIN/SCROTAL      LAPAROSCOPIC APPENDECTOMY N/A 01/31/2018    Procedure: LAPAROSCOPIC APPENDECTOMY;  LAPAROSCOPIC APPENDECTOMY;  Surgeon: Dawn Holt MD;  Location: [facility]    LAPAROSCOPY DIAGNOSTIC (GENERAL) N/A 07/26/2016    Procedure: LAPAROSCOPY DIAGNOSTIC (GENERAL);  Surgeon: Susannah Arriaga MD;  Location: [facility]    LAPAROSCOPY DIAGNOSTIC (GENERAL) N/A 04/16/2018    Procedure: LAPAROSCOPY DIAGNOSTIC (GENERAL);  Diagnostic laparoscopy and lysis of adhesions;  Surgeon: Prince Dowling MD;  Location: [facility]    OPTICAL TRACKING SYSTEM CRANIOTOMY, EXCISE TUMOR, COMBINED Left 04/10/2015    Procedure: COMBINED OPTICAL TRACKING SYSTEM CRANIOTOMY, EXCISE TUMOR;  Surgeon: Mirlande Colmenares MD;  Location: [facility]    PICC INSERTION - TRIPLE LUMEN Right 12/26/2024    Right brachial medial vein 42cm total 4cm external.     REPAIR GAMEKEEPER'S THUMB Right 2016    Procedure: REPAIR LIGAMENT ULNAR COLLATERAL THUMB (GAMEKEEPER'S);  Surgeon: Evin Zamorano MD;  Location: [facility]    RESECT SMALL BOWEL WITHOUT OSTOMY N/A 2024    Procedure: Laparotomy exploratory with small bowel resection.;  Surgeon: Christiano Obrien MD;  Location: [facility]    RESECT SMALL BOWEL WITHOUT OSTOMY N/A 2024    Procedure: EXPLORATORY LAPAROTOMY, SMALL BOWEL RESECTION AND REANASTOMOSIS;  Surgeon: Shabnam Kate MD;  Location: [facility]    ZZC NONSPECIFIC PROCEDURE      right forearm fracture       Prior to Admission Medications   Prior to Admission Medications   Prescriptions Last Dose Informant Patient Reported? Taking?   Continuous Glucose Sensor (DEXCOM G6 SENSOR) MISC  Self No No   Si each every 10 days Change every 10 days.   Continuous Glucose Sensor (DEXCOM G7 SENSOR) MISC  Self No No   Si Device every 10 days.   Continuous Glucose Transmitter (DEXCOM G6 TRANSMITTER) MISC  Self No No   Si each every 3 months Change every 3 months.   Nutritional Supplements (ENSURE ACTIVE) LIQD  Self No No   Sig: Take 414 mLs by mouth daily   bictegravir-emtricitabine-tenofovir (BIKTARVY) -25 MG per tablet   No No   Sig: Take 1 tablet by mouth daily.   blood glucose (NO BRAND SPECIFIED) lancets standard   No No   Sig: Use to test blood sugar 1 time daily or as directed.   blood glucose (NO BRAND SPECIFIED) test strip   No No   Sig: Use to test blood sugar 1 time daily or as directed.   blood glucose monitoring (NO BRAND SPECIFIED) meter device kit   No No   Sig: Use to test blood sugar 3 times daily or as directed.   insulin aspart (NOVOLOG PEN) 100 UNIT/ML pen   No No   Sig: Inject 10 Units subcutaneously 3 times daily (with meals). Take only when you have access to a meal.   insulin glargine (LANTUS PEN) 100 UNIT/ML pen   No No   Sig: Inject 25 Units subcutaneously every morning. REDUCE TO 15 UNITS IF YOU THINK YOU WILL NOT  HAVE FOOD THAT DAY.   insulin pen needle (32G X 4 MM) 32G X 4 MM miscellaneous  Self No No   Sig: Use 4-5 pen needles daily or as directed.   naproxen (NAPROSYN) 500 MG tablet   No No   Sig: Take 1 tablet (500 mg) by mouth 2 times daily (with meals).   oxyCODONE (ROXICODONE) 5 MG tablet   No No   Sig: Take 1 tablet (5 mg) by mouth or Feeding Tube every 8 hours as needed for moderate to severe pain.   pantoprazole (PROTONIX) 40 MG EC tablet  Self No No   Sig: Take 1 tablet (40 mg) by mouth 2 times daily (before meals).      Facility-Administered Medications: None           Physical Exam   Vital Signs: Temp: 98.4  F (36.9  C) Temp src: Oral BP: 127/78 Pulse: 95   Resp: 16 SpO2: 98 % O2 Device: None (Room air)    Weight: 146 lbs 0 oz    Physical Exam   Constitutional:   Well nourished, well developed, resting comfortably   Cardiovascular: Regular rate and rhythm without murmurs or gallops  Pulmonary/Chest: Clear to auscultation bilaterally, with no wheezes or retractions. No respiratory distress.  GI: Soft with good bowel sounds.  Non-tender, non-distended, with no guarding, no rebound, no peritoneal signs.  Mid abdominal wound healing well without any drainage.  Musculoskeletal:  No edema or clubbing   Skin: Skin is warm and dry. No rash noted.   Neurological: Alert and oriented to person, place, and time. Nonfocal exam  Psychiatric:  Normal mood and affect.      Medical Decision Making       25 MINUTES SPENT BY ME on the date of service doing chart review, history, exam, documentation & further activities per the note.      Data   CBC, BMP, CT scan and past discharge summary reviewed.

## 2025-01-25 NOTE — PROGRESS NOTES
Surgery Progress Note  01/25/2025       Subjective:  The patient reports that he continues to have abdominal pain overnight.      Objective:  Temp:  [97.1  F (36.2  C)-98.4  F (36.9  C)] 98.4  F (36.9  C)  Pulse:  [] 89  Resp:  [16-18] 18  BP: (119-135)/(74-88) 133/80  SpO2:  [95 %-99 %] 97 %    I/O last 3 completed shifts:  In: -   Out: 950 [Urine:950]      Gen: Awake, alert, NAD  Resp: NLB on RA  Abd: soft, nondistended, tenderness to palpation  Incision: Open wound with fibrinous material in superior aspect of incision. Granulating tissue in lower aspect of incision.  Ext: Warm and well perfused, no edema     Labs:  Recent Labs   Lab 01/25/25  0806 01/24/25  1237 01/21/25  1241   WBC 8.7 9.0 9.8   HGB 8.0* 8.2* 8.5*    450 475*       Recent Labs   Lab 01/25/25  0656 01/25/25  0312 01/25/25  0019 01/24/25  1237 01/21/25  1746 01/21/25  1241 01/20/25  0727 01/20/25  0618 01/19/25  0817 01/19/25  0621   NA  --   --   --  133*  --  132*  --  132*  --   --    POTASSIUM  --   --   --  4.2  --  3.4  --  4.1  --  4.2   CHLORIDE  --   --   --  98  --  96*  --  99  --   --    CO2  --   --   --  23  --  26  --  25  --   --    BUN  --   --   --  12.9  --  15.8  --  17.5  --   --    CR  --   --   --  0.61*  --  0.72  --  0.70  --   --    * 118* 160* 143*   < > 104*   < > 118*   < >  --    LINDA  --   --   --  8.7*  --  9.2  --  8.8  --   --    MAG  --   --   --   --   --  2.0  --  2.0  --  2.0   PHOS  --   --   --   --   --  3.4  --  3.7  --  3.3    < > = values in this interval not displayed.       Imaging:  Results for orders placed or performed during the hospital encounter of 01/24/25   CT Abdomen Pelvis w Contrast    Impression    IMPRESSION:   1.  A small bowel obstruction at the suture line is more distended than on the prior study.  2.  Subcapsular fluid along the liver and a small collection in the left abdomen. The fluid along the liver is large enough to drain.  3.  Circumferential wall thickening  of the bladder may be from underdistention or cystitis.           Assessment/Plan:   59 year old male with a past medical history of chronic abdominal pain, AIDS, diabetes mellitus type 2 and PTSD.  The patient underwent exploratory laparotomy and small bowel resection on December 20, 2024 for small bowel obstruction, with return to the OR on December 24, 2024 for anastomotic leak.  He is currently being evaluated for small bowel obstruction.  He was transferred from the [facility] - [place], [place] he reports that his pain is necessarily his abdomen, but he feels bloated and gassy. The patient did not report vomiting. He did report some nausea. He was recently discharged on January 21, 2025. The patient presents today for a small bowel obstruction. He was admitted to to the medicine service      - IR Drain for hepatic lesions.  - Suppository  - NPO  - Simethicone    Seen, examined, and discussed with chief resident, who will discuss with staff.  - - - - - - - - - - - - - - - - - -  Samuel Byrne MD  Surgery Resident

## 2025-01-25 NOTE — PLAN OF CARE
Goal Outcome Evaluation:      Plan of Care Reviewed With: patient    Overall Patient Progress: no changeOverall Patient Progress: no change    Temp: 98.4  F (36.9  C) Temp src: Oral BP: 124/73 Pulse: 89   Resp: 18 SpO2: 98 % O2 Device: None (Room air)      Pt is A&O X4, cooperative with cares, and communicate needs. VSS. 02 >98% on RA, denies SOB. Up independently. Persistent abdominal pain adequately relieved with PRN IV dilaudid. Midline incision dressing CDI. BG stable. NPO, on continuous NS at 75mL/hr. Denies nausea. Pt had a small BM after suppository admin.

## 2025-01-25 NOTE — PROGRESS NOTES
"Care Management Follow Up    Length of Stay (days): 0    Expected Discharge Date:       Concerns to be Addressed:       Patient plan of care discussed at interdisciplinary rounds: No    Anticipated Discharge Disposition:      [facility]  [address] [place] [address]  Ph: [phone]  [email or web address]  Sandra De Jesus RN      Anticipated Discharge Services:    Anticipated Discharge DME:      Patient/family educated on Medicare website which has current facility and service quality ratings:    Education Provided on the Discharge Plan:    Patient/Family in Agreement with the Plan:      Referrals Placed by CM/SW:    Private pay costs discussed: Not applicable-MA    Discussed  Partnership in Safe Discharge Planning  document with patient/family: No     Handoff Completed: No, handoff not indicated or clinically appropriate    Additional Information:    Per Teams message for [facility] RN Sandra De Jesus, pt has a secure placement at the shelter and is able to return without a new referral as long as there is no significant decline in his condition..  The goal is for pt to be independent with his wound cares, but RN is able to assist him M-F in progressing to independence.    Per RN, pt told RN that he didn't want to return, but RN believes that this is because he is \"nervous about/unable to complete his own wound care and (RN is) not here on the weekends\"     Next Steps:    Follow for discharge recommendations    SW will continue to follow as needed.    RADHA Reese, [facility]  ED/OBS   [facility]  Phone: [phone]  Pager: [phone]  Fax: [phone]     After hours DOCUSYS and After Hours Vocera [place]     On-call pager, [phone], 4:00 pm to 8:30 pm      "

## 2025-01-25 NOTE — CONSULTS
[facility]    Consult Note - Emergency General Surgery Service  Date of Admission:  1/24/2025  Consult Requested by: ED  Reason for Consult: Small bowel obstruction    Assessment & Plan: Surgery   Andrew Collier is a 59 year old male admitted on 1/24/2025. He has a past medical history of chronic abdominal pain, AIDS, diabetes mellitus type 2 and PTSD.  The patient underwent exploratory laparotomy and small bowel resection on December 20, 2024 for small bowel obstruction, with return to the OR on December 24, 2024 for anastomotic leak.  He is currently being evaluated for small bowel obstruction.  He was transferred from the [facility] he reports that his pain is necessarily his abdomen, but he feels bloated and gassy. The patient did not report vomiting. He did report some nausea. He was recently discharged on January 21, 2025.  Patient is hemodynamically stable.  Laboratory chemistries demonstrate elevated alkaline phosphatase and glucose.  The patient is hyponatremic at 133.  There is no elevation in lactic acid, which is at 1.4.  Hematological laboratory studies demonstrate a white blood cell count of 9.0, hemoglobin 8.2 and platelets of 450.  CT abdomen pelvis with contrast demonstrates a small bowel obstruction at the suture line of the anastomosis.  The mid abdomen bowel is mildly distended with a maximum diam of 4 cm.  At this time the general surgery team will plan for no acute surgical intervention.    - Admit to medicine  - Suppositories  - Simethicone  - No NG tube at this time for decompression  - Consider Gastrografin challenge tomorrow        Drains: None     Code Status:  Full    Clinically Significant Risk Factors Present on Admission         # Hyponatremia: Lowest Na = 133 mmol/L in last 2 days, will monitor as appropriate       # Hypoalbuminemia: Lowest albumin = 3.1 g/dL at 1/24/2025 12:37 PM, will monitor as appropriate          # Anemia:  "based on hgb <11      # DMII: A1C = 10.0 % (Ref range: <5.7 %) within past 6 months   # Overweight: Estimated body mass index is 25.06 kg/m  as calculated from the following:    Height as of this encounter: 1.626 m (5' 4\").    Weight as of this encounter: 66.2 kg (146 lb).       # Financial/Environmental Concerns:    # Support System: poor social support noted in nursing assessment  # Housing Instability: noted in nursing assessment       The patient's care was discussed with the Attending Physician, Dr. Garcia and Chief Resident/Fellow.    Samuel Byrne MD  [facility]  Non-urgent messages: Securely message with [employer] (more info)  Text page via [facility] Paging/Directory     ______________________________________________________________________    Chief Complaint   Small bowel obstruction    History is obtained from the patient    History of Present Illness   Andrew Collier is a 59 year old male admitted on 1/24/2025. He has a past medical history of chronic abdominal pain, AIDS, diabetes mellitus type 2 and PTSD.  The patient underwent exploratory laparotomy and small bowel resection on December 20, 2024 for small bowel obstruction, with return to the OR on December 24, 2024 for anastomotic leak.  He is currently being evaluated for small bowel obstruction.  He was transferred from the [facility] he reports that his pain is necessarily his abdomen, but he feels bloated and gassy.  He was recently discharged on January 21, 2025.      Past Medical History    Past Medical History:   Diagnosis Date    Acute appendicitis with localized peritonitis 01/31/2018    AIDS (H)     Allergic rhinitis due to other allergen     DNS    Anal dysplasia     Chronic abdominal pain     CNS toxoplasmosis (H)     COVID-19 01/11/2022    Diabetes type 2, controlled (H)     GERD (gastroesophageal reflux disease)     Herpes zoster 09/23/2016    History of seizure     History of " substance abuse (H)     HIV (human immunodeficiency virus infection) (H)     HLD (hyperlipidemia)     Lung nodules     Periungual wart     Pneumonia 01/06/2019    PTSD (post-traumatic stress disorder)     Sleep apnea     doesn't use CPAP       Past Surgical History   Past Surgical History:   Procedure Laterality Date    ANOSCOPY N/A 09/09/2020    Procedure: Exam Under Anesthesia, ANOSCOPY, fulgeration of rectal fissures with Rectal Biopsies;  Surgeon: Thanh Lundberg MD;  Location: [facility]    COLONOSCOPY Left 01/22/2016    Procedure: COMBINED COLONOSCOPY, SINGLE OR MULTIPLE BIOPSY/POLYPECTOMY BY BIOPSY;  Surgeon: Clark Saini MD;  Location: [facility]    ESOPHAGOSCOPY, GASTROSCOPY, DUODENOSCOPY (EGD), COMBINED N/A 06/06/2022    Procedure: ESOPHAGOGASTRODUODENOSCOPY, WITH BIOPSY;  Surgeon: Kecia Benítez MD;  Location: [facility]    ESOPHAGOSCOPY, GASTROSCOPY, DUODENOSCOPY (EGD), COMBINED N/A 10/10/2024    Procedure: Esophagoscopy, gastroscopy, duodenoscopy (EGD), combined;  Surgeon: Nico Bhatti DO;  Location: [facility]    HC EXPLORE UNDESC TESTIS,INGUIN/SCROTAL      LAPAROSCOPIC APPENDECTOMY N/A 01/31/2018    Procedure: LAPAROSCOPIC APPENDECTOMY;  LAPAROSCOPIC APPENDECTOMY;  Surgeon: Dawn Holt MD;  [facility]    LAPAROSCOPY DIAGNOSTIC (GENERAL) N/A 07/26/2016    Procedure: LAPAROSCOPY DIAGNOSTIC (GENERAL);  Surgeon: Susannah Arriaga MD;  [facility]    LAPAROSCOPY DIAGNOSTIC (GENERAL) N/A 04/16/2018    Procedure: LAPAROSCOPY DIAGNOSTIC (GENERAL);  Diagnostic laparoscopy and lysis of adhesions;  Surgeon: Prince Dowling MD;  Location: [facility]    OPTICAL TRACKING SYSTEM CRANIOTOMY, EXCISE TUMOR, COMBINED Left 04/10/2015    Procedure: COMBINED OPTICAL TRACKING SYSTEM CRANIOTOMY, EXCISE TUMOR;  Surgeon: Mirlande Colmenares MD;  Location: [facility]    PICC INSERTION - TRIPLE LUMEN Right 12/26/2024    Right brachial medial vein 42cm total 4cm external.    REPAIR GAMEKEEPER'S THUMB Right  12/02/2016    Procedure: REPAIR LIGAMENT ULNAR COLLATERAL THUMB (GAMEKEEPER'S);  Surgeon: Evin Zamorano MD;  [facility]    RESECT SMALL BOWEL WITHOUT OSTOMY N/A 12/20/2024    Procedure: Laparotomy exploratory with small bowel resection.;  Surgeon: Christiano Obrien MD;  [facility]    RESECT SMALL BOWEL WITHOUT OSTOMY N/A 12/24/2024    Procedure: EXPLORATORY LAPAROTOMY, SMALL BOWEL RESECTION AND REANASTOMOSIS;  Surgeon: Shabnam Kate MD;  Location: [facility]    ZZC NONSPECIFIC PROCEDURE      right forearm fracture       Prior to Admission Medications   (Not in a hospital admission)        Review of Systems    The 10 point Review of Systems is negative other than noted in the HPI or here.     Social History   I have reviewed this patient's social history and updated it with pertinent information if needed.  Social History     Tobacco Use    Smoking status: Some Days     Current packs/day: 0.25     Average packs/day: 0.3 packs/day for 40.0 years (10.0 ttl pk-yrs)     Types: Cigarettes    Smokeless tobacco: Former   Substance Use Topics    Alcohol use: No     Alcohol/week: 0.0 standard drinks of alcohol     Comment: Last etoh in 2007    Drug use: Not Currently     Types: Marijuana     Comment: chart indicated meth use but pt denies; states he does not use drugs         Family History   I have reviewed this patient's family history and updated it with pertinent information if needed.  Family History   Problem Relation Age of Onset    Diabetes Brother     Diabetes Father     Alzheimer Disease Father     Unknown/Adopted Mother     Diabetes Paternal Grandfather     Cancer No family hx of         no skin cancer    Skin Cancer No family hx of         no famiy hx of skin cancer    Glaucoma No family hx of     Macular Degeneration No family hx of          Allergies   Allergies   Allergen Reactions    Fentanyl Blisters     Per pt, after taking this medication had blisters develope    Tylenol  "[Acetaminophen] Itching     12/27/24 Patient has received acetaminophen multiple times since documentation of this reaction, in 2016 patient was given acetaminophen with codeine - more likely that codeine would cause itching    Dulaglutide Rash    Insulin Lispro Rash     Patient reported    Penicillin V Other (See Comments) and Rash     Diffuse maculopapular rash + feels \"high\", per pt.     Profile shows many uses of Zosyn         Physical Exam   Vital Signs: Temp: 98.4  F (36.9  C) Temp src: Oral BP: 122/74 Pulse: 94   Resp: 16 SpO2: 98 % O2 Device: None (Room air)    Weight: 146 lbs 0 oz  Intake/Output Summary (Last 24 hours) at 1/24/2025 2202  Last data filed at 1/24/2025 2022  Gross per 24 hour   Intake --   Output 250 ml   Net -250 ml     Constitutional: awake, alert, cooperative, uncomfortable and in pain  ENT: normocephalic, without obvious abnormality, atraumatic  Respiratory: No increased work of breathing, good air exchange, clear to auscultation bilaterally, no crackles or wheezing  Cardiovascular: regular rate and rhythm and normal S1 and S2  GI: Abdomen is slightly tender to palpation.  The patient has a abdominal binder on.  Wound is packed with gauze and covered with dressing.  Skin: normal skin color, texture, turgor and no redness, warmth, or swelling  Musculoskeletal:   Neurologic: Mental Status Exam:  Level of Alertness:   awake  Orientation:   person, place, time  Cranial Nerves:  cranial nerves II-XII are grossly intact  Motor Exam:  moves all extremities well and symmetrically  Motor exam is symmetrical 5 out of 5 all extremities bilaterally  Sensory:  Sensory intact  Neuropsychiatric: General: restless and normal eye contact          Data     I have personally reviewed the following data over the past 24 hrs:    9.0  \   8.2 (L)   / 450     133 (L) 98 12.9 /  118 (H)   4.2 23 0.61 (L) \     ALT: 26 AST: 31 AP: 298 (H) TBILI: 0.3   ALB: 3.1 (L) TOT PROTEIN: 7.5 LIPASE: 39     Procal: N/A CRP: " N/A Lactic Acid: 1.4         Imaging results reviewed over the past 24 hrs:   Recent Results (from the past 24 hours)   CT Abdomen Pelvis w Contrast    Narrative    EXAM: CT ABDOMEN PELVIS W CONTRAST  LOCATION: [facility]  DATE: 1/24/2025    INDICATION: Recent SBO, multiple complicated surgeries, now with abdominal pain and nausea  COMPARISON: 12/30/24  TECHNIQUE: CT scan of the abdomen and pelvis was performed following injection of IV contrast. Multiplanar reformats were obtained. Dose reduction techniques were used.  CONTRAST: 71mL Isovue 370    FINDINGS:   LOWER CHEST: Small right pleural effusion. Scattered peripheral groundglass opacities in the lung bases. There is coronary artery disease.    HEPATOBILIARY: Cholelithiasis.    PANCREAS: Normal.    SPLEEN: Normal.    ADRENAL GLANDS: Normal.    KIDNEYS/BLADDER: Circumferential wall thickening of the bladder.    BOWEL: Normal stomach. A loop of small bowel in the midabdomen is distended with a maximum diameter of approximately 4 cm. There is mottled stool in this location. This is in the region of a bowel suture line. More distally the small bowel loops are   normal in caliber.    LYMPH NODES: Normal.    VASCULATURE: Normal.    PELVIC ORGANS: Enlarged prostate gland. A penile prosthesis is present.    OTHER: Subcapsular fluid near the liver dome is 7.7 x 3.2 x 11.8 cm. A smaller collection along the inferior aspect of the liver is 1.1 x 3.9 x 3.2 cm. A small amount of fluid in the left abdomen is 4.0 x 1.2 x 2.3 cm.    MUSCULOSKELETAL: Midline ventral abdominal and pelvic wall surgical defect.      Impression    IMPRESSION:   1.  A small bowel obstruction at the suture line is more distended than on the prior study.  2.  Subcapsular fluid along the liver and a small collection in the left abdomen. The fluid along the liver is large enough to drain.  3.  Circumferential wall thickening of the bladder may be from  underdistention or cystitis.

## 2025-01-26 LAB
ANION GAP SERPL CALCULATED.3IONS-SCNC: 8 MMOL/L (ref 7–15)
BUN SERPL-MCNC: 10.2 MG/DL (ref 8–23)
CALCIUM SERPL-MCNC: 8.8 MG/DL (ref 8.8–10.4)
CHLORIDE SERPL-SCNC: 99 MMOL/L (ref 98–107)
CREAT SERPL-MCNC: 0.66 MG/DL (ref 0.67–1.17)
EGFRCR SERPLBLD CKD-EPI 2021: >90 ML/MIN/1.73M2
ERYTHROCYTE [DISTWIDTH] IN BLOOD BY AUTOMATED COUNT: 14.5 % (ref 10–15)
GLUCOSE BLDC GLUCOMTR-MCNC: 106 MG/DL (ref 70–99)
GLUCOSE BLDC GLUCOMTR-MCNC: 108 MG/DL (ref 70–99)
GLUCOSE BLDC GLUCOMTR-MCNC: 140 MG/DL (ref 70–99)
GLUCOSE BLDC GLUCOMTR-MCNC: 140 MG/DL (ref 70–99)
GLUCOSE BLDC GLUCOMTR-MCNC: 155 MG/DL (ref 70–99)
GLUCOSE BLDC GLUCOMTR-MCNC: 165 MG/DL (ref 70–99)
GLUCOSE SERPL-MCNC: 119 MG/DL (ref 70–99)
HCO3 SERPL-SCNC: 24 MMOL/L (ref 22–29)
HCT VFR BLD AUTO: 25.2 % (ref 40–53)
HGB BLD-MCNC: 8 G/DL (ref 13.3–17.7)
MCH RBC QN AUTO: 26.2 PG (ref 26.5–33)
MCHC RBC AUTO-ENTMCNC: 31.7 G/DL (ref 31.5–36.5)
MCV RBC AUTO: 83 FL (ref 78–100)
PLATELET # BLD AUTO: 511 10E3/UL (ref 150–450)
POTASSIUM SERPL-SCNC: 3.6 MMOL/L (ref 3.4–5.3)
RBC # BLD AUTO: 3.05 10E6/UL (ref 4.4–5.9)
SODIUM SERPL-SCNC: 131 MMOL/L (ref 135–145)
WBC # BLD AUTO: 8.1 10E3/UL (ref 4–11)

## 2025-01-26 PROCEDURE — 258N000003 HC RX IP 258 OP 636: Performed by: NURSE PRACTITIONER

## 2025-01-26 PROCEDURE — 36415 COLL VENOUS BLD VENIPUNCTURE: CPT | Performed by: PHYSICIAN ASSISTANT

## 2025-01-26 PROCEDURE — 80048 BASIC METABOLIC PNL TOTAL CA: CPT | Performed by: PHYSICIAN ASSISTANT

## 2025-01-26 PROCEDURE — 85018 HEMOGLOBIN: CPT | Performed by: PHYSICIAN ASSISTANT

## 2025-01-26 PROCEDURE — 82310 ASSAY OF CALCIUM: CPT | Performed by: PHYSICIAN ASSISTANT

## 2025-01-26 PROCEDURE — 250N000011 HC RX IP 250 OP 636: Mod: JW | Performed by: NURSE PRACTITIONER

## 2025-01-26 PROCEDURE — 99207 PR APP CREDIT; MD BILLING SHARED VISIT: CPT | Performed by: PHYSICIAN ASSISTANT

## 2025-01-26 PROCEDURE — 250N000013 HC RX MED GY IP 250 OP 250 PS 637

## 2025-01-26 PROCEDURE — 250N000012 HC RX MED GY IP 250 OP 636 PS 637: Performed by: NURSE PRACTITIONER

## 2025-01-26 PROCEDURE — 82962 GLUCOSE BLOOD TEST: CPT

## 2025-01-26 PROCEDURE — 83735 ASSAY OF MAGNESIUM: CPT | Performed by: NURSE PRACTITIONER

## 2025-01-26 PROCEDURE — 120N000002 HC R&B MED SURG/OB UMMC

## 2025-01-26 PROCEDURE — 99232 SBSQ HOSP IP/OBS MODERATE 35: CPT | Mod: FS | Performed by: HOSPITALIST

## 2025-01-26 PROCEDURE — 250N000011 HC RX IP 250 OP 636: Performed by: PHYSICIAN ASSISTANT

## 2025-01-26 PROCEDURE — 250N000013 HC RX MED GY IP 250 OP 250 PS 637: Performed by: PHYSICIAN ASSISTANT

## 2025-01-26 RX ORDER — KETOROLAC TROMETHAMINE 30 MG/ML
15 INJECTION, SOLUTION INTRAMUSCULAR; INTRAVENOUS ONCE
Status: CANCELLED | OUTPATIENT
Start: 2025-01-26 | End: 2025-01-26

## 2025-01-26 RX ORDER — SIMETHICONE 80 MG
80 TABLET,CHEWABLE ORAL EVERY 6 HOURS PRN
Status: DISCONTINUED | OUTPATIENT
Start: 2025-01-26 | End: 2025-01-26

## 2025-01-26 RX ORDER — BISACODYL 10 MG
10 SUPPOSITORY, RECTAL RECTAL DAILY PRN
Status: DISCONTINUED | OUTPATIENT
Start: 2025-01-26 | End: 2025-02-03 | Stop reason: HOSPADM

## 2025-01-26 RX ORDER — HYDROMORPHONE HYDROCHLORIDE 1 MG/ML
0.5 INJECTION, SOLUTION INTRAMUSCULAR; INTRAVENOUS; SUBCUTANEOUS EVERY 4 HOURS PRN
Status: DISCONTINUED | OUTPATIENT
Start: 2025-01-26 | End: 2025-02-02

## 2025-01-26 RX ORDER — SENNOSIDES 8.6 MG
8.6 TABLET ORAL 2 TIMES DAILY
Status: DISCONTINUED | OUTPATIENT
Start: 2025-01-26 | End: 2025-02-03 | Stop reason: HOSPADM

## 2025-01-26 RX ORDER — OXYCODONE HYDROCHLORIDE 5 MG/1
5 TABLET ORAL EVERY 4 HOURS PRN
Status: DISCONTINUED | OUTPATIENT
Start: 2025-01-26 | End: 2025-02-03 | Stop reason: HOSPADM

## 2025-01-26 RX ORDER — NICOTINE POLACRILEX 4 MG
15-30 LOZENGE BUCCAL
Status: DISCONTINUED | OUTPATIENT
Start: 2025-01-26 | End: 2025-01-26

## 2025-01-26 RX ORDER — POLYETHYLENE GLYCOL 3350 17 G/17G
17 POWDER, FOR SOLUTION ORAL 2 TIMES DAILY
Status: DISCONTINUED | OUTPATIENT
Start: 2025-01-26 | End: 2025-02-03 | Stop reason: HOSPADM

## 2025-01-26 RX ORDER — DEXTROSE MONOHYDRATE 25 G/50ML
25-50 INJECTION, SOLUTION INTRAVENOUS
Status: DISCONTINUED | OUTPATIENT
Start: 2025-01-26 | End: 2025-01-26

## 2025-01-26 RX ADMIN — POLYETHYLENE GLYCOL 3350 17 G: 17 POWDER, FOR SOLUTION ORAL at 13:02

## 2025-01-26 RX ADMIN — POLYETHYLENE GLYCOL 3350 17 G: 17 POWDER, FOR SOLUTION ORAL at 19:11

## 2025-01-26 RX ADMIN — SENNOSIDES 8.6 MG: 8.6 TABLET ORAL at 19:11

## 2025-01-26 RX ADMIN — SODIUM CHLORIDE: 9 INJECTION, SOLUTION INTRAVENOUS at 05:54

## 2025-01-26 RX ADMIN — SENNOSIDES 8.6 MG: 8.6 TABLET ORAL at 13:02

## 2025-01-26 RX ADMIN — OXYCODONE HYDROCHLORIDE 5 MG: 5 TABLET ORAL at 13:03

## 2025-01-26 RX ADMIN — HYDROMORPHONE HYDROCHLORIDE 0.8 MG: 1 INJECTION, SOLUTION INTRAMUSCULAR; INTRAVENOUS; SUBCUTANEOUS at 04:11

## 2025-01-26 RX ADMIN — HYDROMORPHONE HYDROCHLORIDE 0.8 MG: 1 INJECTION, SOLUTION INTRAMUSCULAR; INTRAVENOUS; SUBCUTANEOUS at 08:30

## 2025-01-26 RX ADMIN — HYDROMORPHONE HYDROCHLORIDE 0.5 MG: 1 INJECTION, SOLUTION INTRAMUSCULAR; INTRAVENOUS; SUBCUTANEOUS at 15:18

## 2025-01-26 RX ADMIN — OXYCODONE HYDROCHLORIDE 5 MG: 5 TABLET ORAL at 16:51

## 2025-01-26 RX ADMIN — BICTEGRAVIR SODIUM, EMTRICITABINE, AND TENOFOVIR ALAFENAMIDE FUMARATE 1 TABLET: 50; 200; 25 TABLET ORAL at 08:30

## 2025-01-26 RX ADMIN — HYDROMORPHONE HYDROCHLORIDE 0.5 MG: 1 INJECTION, SOLUTION INTRAMUSCULAR; INTRAVENOUS; SUBCUTANEOUS at 19:11

## 2025-01-26 RX ADMIN — SIMETHICONE 80 MG: 80 TABLET, CHEWABLE ORAL at 08:41

## 2025-01-26 RX ADMIN — HYDROMORPHONE HYDROCHLORIDE 0.8 MG: 1 INJECTION, SOLUTION INTRAMUSCULAR; INTRAVENOUS; SUBCUTANEOUS at 10:46

## 2025-01-26 RX ADMIN — INSULIN GLARGINE 15 UNITS: 100 INJECTION, SOLUTION SUBCUTANEOUS at 08:30

## 2025-01-26 RX ADMIN — OXYCODONE HYDROCHLORIDE 5 MG: 5 TABLET ORAL at 22:02

## 2025-01-26 RX ADMIN — INSULIN ASPART 1 UNITS: 100 INJECTION, SOLUTION INTRAVENOUS; SUBCUTANEOUS at 00:26

## 2025-01-26 RX ADMIN — MAGNESIUM HYDROXIDE 30 ML: 400 SUSPENSION ORAL at 13:02

## 2025-01-26 RX ADMIN — HYDROMORPHONE HYDROCHLORIDE 0.8 MG: 1 INJECTION, SOLUTION INTRAMUSCULAR; INTRAVENOUS; SUBCUTANEOUS at 00:28

## 2025-01-26 RX ADMIN — HYDROMORPHONE HYDROCHLORIDE 0.8 MG: 1 INJECTION, SOLUTION INTRAMUSCULAR; INTRAVENOUS; SUBCUTANEOUS at 06:13

## 2025-01-26 ASSESSMENT — ACTIVITIES OF DAILY LIVING (ADL)
ADLS_ACUITY_SCORE: 58
ADLS_ACUITY_SCORE: 56
ADLS_ACUITY_SCORE: 58

## 2025-01-26 NOTE — MEDICATION SCRIBE - ADMISSION MEDICATION HISTORY
Medication Scribe Admission Medication History    Admission medication history is complete. The information provided in this note is only as accurate as the sources available at the time of the update.    Information Source(s): Patient via in-person    Pertinent Information: pt was upset and agitated with writer/interviewer during interview regarding PTA medication list. He started being agitated at the very beginning of the interview when he was asked to confirmed his name and birth day. Writer  explained to pt the importance of confirming that they have the right and proper information in order to speak to with him. As the interview continual, pt got more upset and agitated. He begun turning left and right, and kicking his legs in the bed as he replied to questions about his home medications. When asked if he is still taking medications, and last dose at home, pt said why are you asking me all these stupid questions? Maybe two or three days ago, but I don't  know, or I don't have it, and I'm in pain.      Changes made to PTA medication list:  Added: None  Deleted: None  Changed: None    Allergies reviewed with patient and updates made in EHR: yes    Medication History Completed By: Christel Otero 1/26/2025 3:54 AM    PTA Med List   Medication Sig Note Last Dose/Taking    bictegravir-emtricitabine-tenofovir (BIKTARVY) -25 MG per tablet Take 1 tablet by mouth daily.  1/23/2025    insulin aspart (NOVOLOG PEN) 100 UNIT/ML pen Inject 10 Units subcutaneously 3 times daily (with meals). Take only when you have access to a meal. 1/26/2025: Per pt: I don't know  Unknown    insulin glargine (LANTUS PEN) 100 UNIT/ML pen Inject 25 Units subcutaneously every morning. REDUCE TO 15 UNITS IF YOU THINK YOU WILL NOT HAVE FOOD THAT DAY. 1/26/2025: Per pt: I don't know Unknown

## 2025-01-26 NOTE — PROGRESS NOTES
Surgery Progress Note  01/26/2025       Subjective:  - Patient awake at bedside, NPO without NVBH, pain 9/10 overnight with dilaudid 0.8mg Q2H. Voiding without issue, passing BM/Gas, ambulatory.     Objective:  Temp:  [98.1  F (36.7  C)-98.4  F (36.9  C)] 98.4  F (36.9  C)  Pulse:  [89-98] 89  Resp:  [16-18] 18  BP: (120-135)/(69-86) 120/84  SpO2:  [94 %-99 %] 99 %    I/O last 3 completed shifts:  In: 1549 [I.V.:1549]  Out: 1450 [Urine:1450]      Gen: Awake, alert, NAD  CV: RRR  Resp: NLB on RA  Abd: soft, nondistended, nontender, Open wound with fibrinous material in superior aspect of incision. Granulating tissue in lower aspect of incision.   Ext: WWP, no edema     Labs:  Recent Labs   Lab 01/26/25  0613 01/25/25  0806 01/24/25  1237   WBC 8.1 8.7 9.0   HGB 8.0* 8.0* 8.2*   * 437 450       Recent Labs   Lab 01/26/25  0437 01/26/25  0025 01/25/25  1909 01/25/25  1118 01/25/25  0806 01/25/25  0019 01/24/25  1237 01/21/25  1746 01/21/25  1241 01/20/25  0727 01/20/25  0618   NA  --   --   --   --  134*  --  133*  --  132*  --  132*   POTASSIUM  --   --   --   --  3.8  --  4.2  --  3.4  --  4.1   CHLORIDE  --   --   --   --  103  --  98  --  96*  --  99   CO2  --   --   --   --  23  --  23  --  26 --  25   BUN  --   --   --   --  7.4*  --  12.9  --  15.8  --  17.5   CR  --   --   --   --  0.56*  --  0.61*  --  0.72  --  0.70   * 140* 98   < > 116*   < > 143*   < > 104*   < > 118*   LINDA  --   --   --   --  8.4*  --  8.7*  --  9.2  --  8.8   MAG  --   --   --   --   --   --   --   --  2.0  --  2.0   PHOS  --   --   --   --   --   --   --   --  3.4  --  3.7    < > = values in this interval not displayed.       Imaging:  EXAM: CT ABDOMEN PELVIS W CONTRAST  LOCATION: [facility]  DATE: 1/24/2025     INDICATION: Recent SBO, multiple complicated surgeries, now with abdominal pain and nausea  COMPARISON: 12/30/24  TECHNIQUE: CT scan of the abdomen and pelvis was  performed following injection of IV contrast. Multiplanar reformats were obtained. Dose reduction techniques were used.  CONTRAST: 71mL Isovue 370     FINDINGS:   LOWER CHEST: Small right pleural effusion. Scattered peripheral groundglass opacities in the lung bases. There is coronary artery disease.     HEPATOBILIARY: Cholelithiasis.     PANCREAS: Normal.     SPLEEN: Normal.     ADRENAL GLANDS: Normal.     KIDNEYS/BLADDER: Circumferential wall thickening of the bladder.     BOWEL: Normal stomach. A loop of small bowel in the midabdomen is distended with a maximum diameter of approximately 4 cm. There is mottled stool in this location. This is in the region of a bowel suture line. More distally the small bowel loops are   normal in caliber.     LYMPH NODES: Normal.     VASCULATURE: Normal.     PELVIC ORGANS: Enlarged prostate gland. A penile prosthesis is present.     OTHER: Subcapsular fluid near the liver dome is 7.7 x 3.2 x 11.8 cm. A smaller collection along the inferior aspect of the liver is 1.1 x 3.9 x 3.2 cm. A small amount of fluid in the left abdomen is 4.0 x 1.2 x 2.3 cm.     MUSCULOSKELETAL: Midline ventral abdominal and pelvic wall surgical defect.                                                                      IMPRESSION:   1.  A small bowel obstruction at the suture line is more distended than on the prior study.  2.  Subcapsular fluid along the liver and a small collection in the left abdomen. The fluid along the liver is large enough to drain.  3.  Circumferential wall thickening of the bladder may be from underdistention or cystitis.     Assessment/Plan:   59 year old male with a past medical history of chronic abdominal pain, AIDS, diabetes mellitus type 2 and PTSD.  The patient underwent exploratory laparotomy and small bowel resection on December 20, 2024 for small bowel obstruction, with return to the OR on December 24, 2024 for anastomotic leak.  He is currently being evaluated for small  bowel obstruction.  He was transferred from the [facility] he reports that his pain is necessarily his abdomen, but he feels bloated and gassy. The patient did not report vomiting. He did report some nausea. He was recently discharged on January 21, 2025. The patient presents today for a small bowel obstruction. He was admitted to to the medicine service.    Recommendations:  -Okay for Regular Diet  -Simethicone  -Bowel Reg: Miralax BID, Senna BID, MOM every day  -Dressing changes BID  -Okay to discharge from surgical standpoint     Seen, examined, and discussed with chief resident, who will discuss with staff.  - - - - - - - - - - - - - - - - - -  Koby Garrido MD  General Surgery, PGY-1  Emergency General Surgery    Abe Hansen DO, MS   General Surgery, PGY 1

## 2025-01-26 NOTE — PROGRESS NOTES
[facility]    Medicine Progress Note - Hospitalist Service, GOLD TEAM 6    Date of Admission:  1/24/2025    Assessment & Plan   Andrew Collier is a 59 year old man admitted on 1/24/2025. He has a history of chronic abdominal pain, DM II, AIDS, PTSD and SBO s/p ex lap w/ small bowel resection on 12/20 with return to OR on 12/24 for anastomotic leak.  He is admitted with recurrent small bowel obstruction.    Interim history   Patient states pain is much better. Tolerating diet. Interested in backing off of pain medication.     Plan   Surgery following   Regular diet   Bowel regimen   Wean opiates as able   WOC consult  SW/CM due to homelessness/transition back to shelter when stable for discharge     # Recurrent SBO   # SBO s/p ex lap w/ small bowel resection on 12/20 with return to OR on 12/24 for anastomotic leak  # Acute pain 2/2 above  The patient had a SBO in December of last year and underwent small bowel resection on 12/20 with return to the OR on 12/24 for an anastomotic leak.  He is now readmitted with nausea, vomiting and recurrent SBO on CT scan. NG deferred due to lack of vomiting.   - General surgery already consulted, appreciate involvement  - advanced to regular diet 1/26  -Simethicone prn  -Bowel Reg: Miralax BID, Senna BID, MOM every day  - oxycodone 4 mg every 4 hours prn, IV dilaudid 0.5 mg every 4 hours prn if unable to take oral or not responding to oral    # hx of homelessness   - Patient reports he has been staying at a homeless shelter on [facility] where they are capable of assisting him with wound cares. Appreciate SW/CM following to assist with placement if needed      # History of DM II  - Continue home lantus, will decrease dose to 15 units daily (may need up titration with diet)  - Continue sliding scale insulin     # Abdominal wound 2/2 above   - woc consult    # History of AIDS  - Resume home Biktarvy once able to take PO    # IR  "Drain for hepatic lesions.  - IR consulted no indication for drainage at this time given reduction in size without antibiotics     # HIV   Continue Bictarvy     # hx of meth use   Denies recent drug use          Diet: Regular Diet Adult    DVT Prophylaxis: scds  Potts Catheter: Not present  Lines: None     Cardiac Monitoring: None  Code Status: Full Code      Clinically Significant Risk Factors         # Hyponatremia: Lowest Na = 131 mmol/L in last 2 days, will monitor as appropriate       # Hypoalbuminemia: Lowest albumin = 3.1 g/dL at 1/24/2025 12:37 PM, will monitor as appropriate               # DMII: A1C = 10.0 % (Ref range: <5.7 %) within past 6 months   # Overweight: Estimated body mass index is 25.06 kg/m  as calculated from the following:    Height as of this encounter: 1.626 m (5' 4\").    Weight as of this encounter: 66.2 kg (146 lb)., PRESENT ON ADMISSION     # Financial/Environmental Concerns:    # Support System: poor social support noted in nursing assessment  # Housing Instability: noted in nursing assessment         Social Drivers of Health    Food Insecurity: Low Risk  (12/21/2024)    Food Insecurity     Within the past 12 months, did you worry that your food would run out before you got money to buy more?: No     Within the past 12 months, did the food you bought just not last and you didn t have money to get more?: No   Recent Concern: Food Insecurity - High Risk (10/24/2024)    Food Insecurity     Within the past 12 months, did you worry that your food would run out before you got money to buy more?: Yes     Within the past 12 months, did the food you bought just not last and you didn t have money to get more?: Yes   Housing Stability: High Risk (12/21/2024)    Housing Stability     Do you have housing? : No     Are you worried about losing your housing?: Yes   Tobacco Use: High Risk (1/21/2025)    Patient History     Smoking Tobacco Use: Some Days     Smokeless Tobacco Use: Former   Financial " Resource Strain: High Risk (12/21/2024)    Financial Resource Strain     Within the past 12 months, have you or your family members you live with been unable to get utilities (heat, electricity) when it was really needed?: Yes   Alcohol Use: Unknown (8/27/2020)    AUDIT-C     Average Number of Drinks: Patient declined   Transportation Needs: High Risk (12/21/2024)    Transportation Needs     Within the past 12 months, has lack of transportation kept you from medical appointments, getting your medicines, non-medical meetings or appointments, work, or from getting things that you need?: Yes   Interpersonal Safety: Low Risk  (12/21/2024)    Interpersonal Safety     Do you feel physically and emotionally safe where you currently live?: Yes     Within the past 12 months, have you been hit, slapped, kicked or otherwise physically hurt by someone?: No     Within the past 12 months, have you been humiliated or emotionally abused in other ways by your partner or ex-partner?: No   Recent Concern: Interpersonal Safety - High Risk (10/24/2024)    Interpersonal Safety     Do you feel physically and emotionally safe where you currently live?: No     Within the past 12 months, have you been hit, slapped, kicked or otherwise physically hurt by someone?: No     Within the past 12 months, have you been humiliated or emotionally abused in other ways by your partner or ex-partner?: No    Received from [facility], [facility]    Social Connections          Disposition Plan     Medically Ready for Discharge: Anticipated in 2-4 Days           The patient's care was discussed with the medicine attending    [employer] PA-C  Hospitalist Service, GOLD TEAM [address] [place] [address]  Securely message with [employer] (more info)  Text page via [facility] Paging/Directory   See signed in provider for up to date coverage  information  ______________________________________________________________________    Interval History   See above     Physical Exam   Vital Signs: Temp: 98.4  F (36.9  C) Temp src: Oral BP: 120/84 Pulse: 89   Resp: 18 SpO2: 99 % O2 Device: None (Room air)    Weight: 146 lbs 0 oz    General Appearance: Alert, no acute distress   HEENT: Anicteric sclera, MMM   Respiratory: Lungs CTAB, No wheezing , no crackles,   Cardiovascular: Regular rate , Regular rhythm , no murmur   GI: Abdomen soft, non-tender, active bowel sounds. No guarding or rebound  Skin: No rash or jaundice on exposed skin   Musculoskeletal: No lower extremity edema   Neurologic: Oriented X 3 , CN II-XII grossly intact, Moves extremities equally       Medical Decision Making       60 MINUTES SPENT BY ME on the date of service doing chart review, history, exam, documentation & further activities per the note.      Data   ------------------------- PAST 24 HR DATA REVIEWED -----------------------------------------------

## 2025-01-26 NOTE — PLAN OF CARE
"Neuro: A&Ox4.   Cardiac: VSS. /78 (Cuff Size: Adult Regular)   Pulse 90   Temp 98.1  F (36.7  C) (Oral)   Resp 16   Ht 1.626 m (5' 4\")   Wt 66.2 kg (146 lb)   SpO2 99%   BMI 25.06 kg/m      Respiratory: Sating >93%  on RA.  GI/: Adequate urine output. No BM this shift.   Diet/appetite: Tolerating regular diet. Eating well.  Activity:  Independent   Pain: Pt reported 7-9/10 abdominal and back pain. PRN Dilaudid given X3 this shift with minimal-moderate relief. PRN Simethicone given X1 for cramping with moderate relief.   Skin: No new deficits noted.  LDA's: L PIV SL.  Shift Updates: Continuous fluids discontinued this shift. Transitioned to regular diet.     Plan: Continue with POC. Notify primary team with changes.     "

## 2025-01-26 NOTE — PROVIDER NOTIFICATION
Pt refusing CPAP and does not want one during his stay at the hospital. Will discontinue order.     Nayan Mendez, RT on 1/25/2025 at 9:57 PM

## 2025-01-27 LAB
GLUCOSE BLDC GLUCOMTR-MCNC: 146 MG/DL (ref 70–99)
GLUCOSE BLDC GLUCOMTR-MCNC: 157 MG/DL (ref 70–99)
GLUCOSE BLDC GLUCOMTR-MCNC: 161 MG/DL (ref 70–99)
GLUCOSE BLDC GLUCOMTR-MCNC: 171 MG/DL (ref 70–99)
MAGNESIUM SERPL-MCNC: 1.4 MG/DL (ref 1.7–2.3)

## 2025-01-27 PROCEDURE — 250N000013 HC RX MED GY IP 250 OP 250 PS 637: Performed by: PHYSICIAN ASSISTANT

## 2025-01-27 PROCEDURE — 120N000002 HC R&B MED SURG/OB UMMC

## 2025-01-27 PROCEDURE — 250N000011 HC RX IP 250 OP 636: Performed by: PHYSICIAN ASSISTANT

## 2025-01-27 PROCEDURE — 250N000013 HC RX MED GY IP 250 OP 250 PS 637: Performed by: NURSE PRACTITIONER

## 2025-01-27 PROCEDURE — 250N000013 HC RX MED GY IP 250 OP 250 PS 637

## 2025-01-27 PROCEDURE — 99233 SBSQ HOSP IP/OBS HIGH 50: CPT | Performed by: NURSE PRACTITIONER

## 2025-01-27 PROCEDURE — G0463 HOSPITAL OUTPT CLINIC VISIT: HCPCS

## 2025-01-27 RX ORDER — NALOXONE HYDROCHLORIDE 0.4 MG/ML
0.4 INJECTION, SOLUTION INTRAMUSCULAR; INTRAVENOUS; SUBCUTANEOUS
Status: DISCONTINUED | OUTPATIENT
Start: 2025-01-27 | End: 2025-02-03 | Stop reason: HOSPADM

## 2025-01-27 RX ORDER — NALOXONE HYDROCHLORIDE 0.4 MG/ML
0.2 INJECTION, SOLUTION INTRAMUSCULAR; INTRAVENOUS; SUBCUTANEOUS
Status: DISCONTINUED | OUTPATIENT
Start: 2025-01-27 | End: 2025-02-03 | Stop reason: HOSPADM

## 2025-01-27 RX ADMIN — OXYCODONE HYDROCHLORIDE 5 MG: 5 TABLET ORAL at 20:58

## 2025-01-27 RX ADMIN — OXYCODONE HYDROCHLORIDE 5 MG: 5 TABLET ORAL at 11:51

## 2025-01-27 RX ADMIN — OXYCODONE HYDROCHLORIDE 5 MG: 5 TABLET ORAL at 06:36

## 2025-01-27 RX ADMIN — HYDROMORPHONE HYDROCHLORIDE 0.5 MG: 1 INJECTION, SOLUTION INTRAMUSCULAR; INTRAVENOUS; SUBCUTANEOUS at 19:53

## 2025-01-27 RX ADMIN — MAGNESIUM HYDROXIDE 30 ML: 400 SUSPENSION ORAL at 11:50

## 2025-01-27 RX ADMIN — BICTEGRAVIR SODIUM, EMTRICITABINE, AND TENOFOVIR ALAFENAMIDE FUMARATE 1 TABLET: 50; 200; 25 TABLET ORAL at 09:17

## 2025-01-27 RX ADMIN — HYDROMORPHONE HYDROCHLORIDE 0.5 MG: 1 INJECTION, SOLUTION INTRAMUSCULAR; INTRAVENOUS; SUBCUTANEOUS at 00:29

## 2025-01-27 RX ADMIN — POLYETHYLENE GLYCOL 3350 17 G: 17 POWDER, FOR SOLUTION ORAL at 20:57

## 2025-01-27 RX ADMIN — SENNOSIDES 8.6 MG: 8.6 TABLET ORAL at 20:57

## 2025-01-27 RX ADMIN — HYDROMORPHONE HYDROCHLORIDE 0.5 MG: 1 INJECTION, SOLUTION INTRAMUSCULAR; INTRAVENOUS; SUBCUTANEOUS at 14:13

## 2025-01-27 RX ADMIN — HYDROMORPHONE HYDROCHLORIDE 0.5 MG: 1 INJECTION, SOLUTION INTRAMUSCULAR; INTRAVENOUS; SUBCUTANEOUS at 23:54

## 2025-01-27 RX ADMIN — INSULIN GLARGINE 15 UNITS: 100 INJECTION, SOLUTION SUBCUTANEOUS at 09:18

## 2025-01-27 RX ADMIN — HYDROMORPHONE HYDROCHLORIDE 0.5 MG: 1 INJECTION, SOLUTION INTRAMUSCULAR; INTRAVENOUS; SUBCUTANEOUS at 09:24

## 2025-01-27 RX ADMIN — ACETAMINOPHEN 650 MG: 325 TABLET, FILM COATED ORAL at 22:11

## 2025-01-27 RX ADMIN — SIMETHICONE 80 MG: 80 TABLET, CHEWABLE ORAL at 22:11

## 2025-01-27 RX ADMIN — POLYETHYLENE GLYCOL 3350 17 G: 17 POWDER, FOR SOLUTION ORAL at 09:17

## 2025-01-27 RX ADMIN — SENNOSIDES 8.6 MG: 8.6 TABLET ORAL at 09:17

## 2025-01-27 RX ADMIN — OXYCODONE HYDROCHLORIDE 5 MG: 5 TABLET ORAL at 16:42

## 2025-01-27 ASSESSMENT — ACTIVITIES OF DAILY LIVING (ADL)
ADLS_ACUITY_SCORE: 34
ADLS_ACUITY_SCORE: 56
ADLS_ACUITY_SCORE: 34
ADLS_ACUITY_SCORE: 56
ADLS_ACUITY_SCORE: 34
ADLS_ACUITY_SCORE: 56
ADLS_ACUITY_SCORE: 57
ADLS_ACUITY_SCORE: 57
ADLS_ACUITY_SCORE: 56
ADLS_ACUITY_SCORE: 33
ADLS_ACUITY_SCORE: 57
ADLS_ACUITY_SCORE: 33
ADLS_ACUITY_SCORE: 56
ADLS_ACUITY_SCORE: 56
ADLS_ACUITY_SCORE: 57
ADLS_ACUITY_SCORE: 56

## 2025-01-27 NOTE — PROGRESS NOTES
"Blood pressure 126/79, pulse 91, temperature 98.9  F (37.2  C), temperature source Oral, resp. rate 18, height 1.626 m (5' 4\"), weight 66.2 kg (146 lb), SpO2 98%.    Activity: Up ad lexi  Neuros:  AOX4, makes needs known  Cardiac: Tachy, denies chest pain  Respiratory: WDL, RA  GI/: AUOP, no BM  Diet: Regular, tolerating  Skin/Incisions/Drains: Abd surgical wound  Lines: Lt PIV SL  Pain: Oxy  Plan: Continue with POC    "

## 2025-01-27 NOTE — PROGRESS NOTES
"Surgery Progress Note  01/27/2025       Subjective:  Endorsing abdominal discomfort secondary to bloating and \"feeling gassy\". Last BM was 2 days ago. Has decreased appetite. No fever, chills, nausea or vomiting.     Objective:  Temp:  [98.9  F (37.2  C)-99.2  F (37.3  C)] 99.1  F (37.3  C)  Pulse:  [91] 91  Resp:  [16-18] 16  BP: (126-131)/(79-92) 131/92  SpO2:  [98 %-100 %] 98 %    I/O last 3 completed shifts:  In: 240 [P.O.:240]  Out: -       Gen: Awake, alert, NAD  CV: regular rate  Resp: NLB on RA  Abd: soft, nondistended, nontender, Open wound with fibrinous material in superior aspect of incision. Granulating tissue in lower aspect of incision.   Ext: WWP, no edema     Labs:  Recent Labs   Lab 01/26/25  0613 01/25/25  0806 01/24/25  1237   WBC 8.1 8.7 9.0   HGB 8.0* 8.0* 8.2*   * 437 450       Recent Labs   Lab 01/27/25  0859 01/26/25  2101 01/26/25  1650 01/26/25  0806 01/26/25  0613 01/25/25  1118 01/25/25  0806 01/25/25  0019 01/24/25  1237 01/21/25  1746 01/21/25  1241   NA  --   --   --   --  131*  --  134*  --  133*  --  132*   POTASSIUM  --   --   --   --  3.6  --  3.8  --  4.2  --  3.4   CHLORIDE  --   --   --   --  99  --  103  --  98  --  96*   CO2  --   --   --   --  24  --  23  --  23  --  26   BUN  --   --   --   --  10.2  --  7.4*  --  12.9  --  15.8   CR  --   --   --   --  0.66*  --  0.56*  --  0.61*  --  0.72   * 140* 165*   < > 119*   < > 116*   < > 143*   < > 104*   LINDA  --   --   --   --  8.8  --  8.4*  --  8.7*  --  9.2   MAG  --   --   --   --   --   --   --   --   --   --  2.0   PHOS  --   --   --   --   --   --   --   --   --   --  3.4    < > = values in this interval not displayed.       Imaging:  No new imaging     Assessment/Plan:   59 year old male with a past medical history of chronic abdominal pain, AIDS, diabetes mellitus type 2 and PTSD.  The patient underwent exploratory laparotomy and small bowel resection on December 20, 2024 for small bowel obstruction, with " return to the OR on December 24, 2024 for anastomotic leak and recent discharge on Jan 21, 2025. He re-presented on Jan 21, 2025 to [facility] with bloating and transferred to [place] for further evaluation. EGS consulted for SBO  at suture line of anastomosis.     Last BM was 2 days ago, continue bowel regimen. Dressing changed at bedside.    -Continue regular diet  -Continue simethicone  -Continue bowel regimen: Miralax BID, Senna BID and milk of magnesia  -Dressing changes BID. Surgery to perform dressing changes in AM and nursing to perform PM changes.     Seen, examined, and discussed with chief resident, who will discuss with staff.  - - - - - - - - - - - - - - - - - -  Av Cormier MD  Urology, PGY-1 on EGS Service

## 2025-01-27 NOTE — PLAN OF CARE
Goal Outcome Evaluation:    [id number]  Pt. A&O, VSS, RA. C/o of abd pain. Gave x1 prn Oxy and Dilaudid. Bg q4h 140. Gave sliding scale insulin per order. Up with SBA. Tolerating reg, pt only eat crackers and ginger ale.No acute change, continue w/poc

## 2025-01-27 NOTE — PLAN OF CARE
Goal Outcome Evaluation:      Plan of Care Reviewed With: patient    Overall Patient Progress: no changeOverall Patient Progress: no change  Neuro: AOx4, able to make need known  Cardiac: WDL, denies chest pain  Respiratory: on RA, denies SOB  GI/: voiding adequately, no BM, passing flatus  Diet/Appetite: regular, denies N/V  Skin: midline abd wound DIANN.   LDA: (L) PIV-SL  Activity: up ad lexi  Pain: surgical wound managed with PRN Oxycodone & IV dilaudid   Plan: pain management, continue POC

## 2025-01-27 NOTE — DISCHARGE INSTRUCTIONS
"WOC DISCHARGE INSTRUCTIONS:  Abdominal wound: M/W/F   1. Flush wound with NS, wipe clean, pat dry  2. Cut 4x5\" urgo clean AG piece in half and lay sticky side down to wound base  3. Fill in depth with plain gauze  4. Secure with silicone border cover dressings  5. Change MWF + again if soiled, saturated, falls off  6. Call MD for wound decline        Rescheduled PCP appt with Rani Madrigal at [facility], on Thursday 02/13/2025 at 11:15 AM.     "

## 2025-01-27 NOTE — CONSULTS
"[facility] Nurse Inpatient Assessment     Consulted for: abdominal wound    Summary: Pt known to [facility] service. During his previous admit he refused multiple attempts to teach wound care per prior [facility] notes. Stated he is hopmeless and wanted \"the blue stuff\". Stated it would be hard for patient to acquire this product if homeless so BID or daily vashe may be the easiest for him to comply with.     [facility] nurse follow-up plan: weekly if admitted    Patient History (according to provider note(s):      59 year old male with a past medical history of chronic abdominal pain, AIDS, diabetes mellitus type 2 and PTSD.  The patient underwent exploratory laparotomy and small bowel resection on December 20, 2024 for small bowel obstruction, with return to the OR on December 24, 2024 for anastomotic leak.  He is currently being evaluated for small bowel obstruction.  He was transferred from the [facility] he reports that his pain is necessarily his abdomen, but he feels bloated and gassy. The patient did not report vomiting. He did report some nausea. He was recently discharged on January 21, 2025. The patient presents today for a small bowel obstruction. He was admitted to to the medicine service.      Assessment:      Areas visualized during today's visit: Abdomen    Wound location: Abdomen      1/17 prev admit                                                  1/21 (another prev admit)      1/27    Last photo: 1/27  Wound due to: Surgical Wound  Wound history/plan of care: marbled base.  Wound base: 60 % Granulation tissue, 40 % Fibrin vs slough     Palpation of the wound bed: normal      Drainage: small     Description of drainage: serosanguinous     Measurements (length x width x depth, in cm): 16  x 4  x  2 cm      Tunneling: N/A     Undermining: N/A  Periwound skin: Intact      Color: normal and consistent with surrounding tissue      Temperature: normal   Odor: none  Pain: " denies , none  Pain interventions prior to dressing change: slow and gentle cares   Treatment goal: Heal , Infection control/prevention, and Increase granulation  STATUS: initial assessment  Supplies ordered: ordered Vashe, discussed with RN, and discussed with patient        Treatment Plan:     Abdominal wound: Daily   1. Flush wound with NS, wipe clean, pat dry  2. moisten 4x4 gauze with vashe and fill wound  3. cover with abd pad/tape or mepilex  4. Change BID + PRN if soiled, saturated, falls off  5. Call MD for wound decline      Orders: Written    RECOMMEND PRIMARY TEAM ORDER: None, at this time  Education provided: plan of care, wound progress, and Infection prevention   Discussed plan of care with: Patient and Nurse  Notify [facility] if wound(s) deteriorate.  Nursing to notify the Provider(s) and re-consult the [facility] Nurse if new skin concern.    DATA:     Current support surface: Standard  ED cart  Containment of urine/stool: Continent of bladder and Continent of bowel  BMI: Body mass index is 25.06 kg/m .   Active diet order: Orders Placed This Encounter      Regular Diet Adult     Output: I/O last 3 completed shifts:  In: 240 [P.O.:240]  Out: -      Labs:   Recent Labs   Lab 01/26/25  0613 01/25/25  0806 01/24/25  1237 01/21/25  1241   ALBUMIN  --   --  3.1* 3.1*   HGB 8.0*   < > 8.2* 8.5*   WBC 8.1   < > 9.0 9.8   A1C  --   --   --  10.0*    < > = values in this interval not displayed.     Pressure injury risk assessment:   Sensory Perception: 4-->no impairment  Moisture: 4-->rarely moist  Activity: 3-->walks occasionally  Mobility: 3-->slightly limited  Nutrition: 2-->probably inadequate  Friction and Shear: 3-->no apparent problem  Estevan Score: 19    Pager no longer is use, please contact through [employer] group: [facility] Nurse [place]   Dept. Office Number: [phone]           [place] group: [facility] Nurse [place]   Dept. Office Number: [phone]

## 2025-01-27 NOTE — Clinical Note
"/79 (BP Location: Right arm)   Pulse 91   Temp 98.9  F (37.2  C) (Oral)   Resp 18   Ht 1.626 m (5' 4\")   Wt 66.2 kg (146 lb)   SpO2 98%   BMI 25.06 kg/m       Neuro: AOx4, able to make need known  Cardiac: WDL, denies chest pain  Respiratory: on RA, denies SOB  GI/: voiding adequately, no BM, passing flatus  Diet/Appetite: regular, denies N/V  Skin: midline abd wound DIANN.   LDA: (L) PIV-SL  Activity: up ad lexi  Pain: surgical wound managed with PRN Oxycodone & IV dilaudid   Plan: pain management, continue POC    "

## 2025-01-27 NOTE — PROGRESS NOTES
[facility]    Medicine Progress Note - Hospitalist Service, GOLD TEAM 6    Date of Admission:  1/24/2025    Assessment & Plan     Andrew Wolfe is a 59 year old male with past medical history significant for chronic abdominal pain, type 2 DM, HIV, PTSD, SBO s/p ex lap with small bowel resection (12/20/24) with return to OR on 12/24/24 for anastomotic leak, chronic housing instability admitted for recurrent small bowel obstruction.     # Recurrent SBO   # SBO s/p ex lap w/ small bowel resection on 12/20 with return to OR on 12/24 for anastomotic leak  # Acute pain 2/2 above  Readmitted with nausea, vomiting and recurrent SBO on CT scan. NG deferred due to lack of vomiting.  Reports no BM in last 2 days.  Possible that hypomagnesemia is contributory.   - General surgery following, appreciate recs   - Advanced to regular diet 1/26  - Simethicone prn  - Bowel Reg: Miralax BID, Senna BID, MOM every day  - Oxycodone 4 mg every 4 hours prn, IV dilaudid 0.5 mg every 4 hours prn if unable to take oral or not responding to oral    # Hypomagnesemia   Mag 1.4.  - Replacement protocol ordered      # Chronic housing instability  - Patient reports he has been staying at a homeless shelter on [facility] where they are capable of assisting him with wound cares. Appreciate SW/CM following to assist with placement if needed      # Type 2 DM   Last Hgb A1c 10% on 1/21/25.  On Lantus 25 PTA, dose decreased here due to NPO and below baseline PO intake.  Last 24 hr BG as follows:   Recent Labs   Lab 01/27/25  1635 01/27/25  1107 01/27/25  0859 01/26/25  2101 01/26/25  1650 01/26/25  1308   * 171* 161* 140* 165* 155*   - Continue Lantus at decreased dose of 15 units daily for now   - Continue sliding scale insulin  - BG checks ac/hs   - Hypoglycemia protocol      # Abdominal wound 2/2 above   - WOCN consult     # History of HIV  - Continue PTA biktarvy       Diet: Regular  "Diet Adult    DVT Prophylaxis: Pneumatic Compression Devices  Potts Catheter: Not present  Lines: None     Cardiac Monitoring: None  Code Status: Full Code      Clinically Significant Risk Factors         # Hyponatremia: Lowest Na = 131 mmol/L in last 2 days, will monitor as appropriate       # Hypoalbuminemia: Lowest albumin = 3.1 g/dL at 1/24/2025 12:37 PM, will monitor as appropriate               # DMII: A1C = 10.0 % (Ref range: <5.7 %) within past 6 months   # Overweight: Estimated body mass index is 25.06 kg/m  as calculated from the following:    Height as of this encounter: 1.626 m (5' 4\").    Weight as of this encounter: 66.2 kg (146 lb)., PRESENT ON ADMISSION     # Financial/Environmental Concerns:    # Support System: poor social support noted in nursing assessment  # Housing Instability: noted in nursing assessment         Social Drivers of Health    Food Insecurity: Low Risk  (12/21/2024)    Food Insecurity     Within the past 12 months, did you worry that your food would run out before you got money to buy more?: No     Within the past 12 months, did the food you bought just not last and you didn t have money to get more?: No   Recent Concern: Food Insecurity - High Risk (10/24/2024)    Food Insecurity     Within the past 12 months, did you worry that your food would run out before you got money to buy more?: Yes     Within the past 12 months, did the food you bought just not last and you didn t have money to get more?: Yes   Housing Stability: High Risk (12/21/2024)    Housing Stability     Do you have housing? : No     Are you worried about losing your housing?: Yes   Tobacco Use: High Risk (1/21/2025)    Patient History     Smoking Tobacco Use: Some Days     Smokeless Tobacco Use: Former   Financial Resource Strain: High Risk (12/21/2024)    Financial Resource Strain     Within the past 12 months, have you or your family members you live with been unable to get utilities (heat, electricity) when it " was really needed?: Yes   Alcohol Use: Unknown (8/27/2020)    AUDIT-C     Average Number of Drinks: Patient declined   Transportation Needs: High Risk (12/21/2024)    Transportation Needs     Within the past 12 months, has lack of transportation kept you from medical appointments, getting your medicines, non-medical meetings or appointments, work, or from getting things that you need?: Yes   Interpersonal Safety: Low Risk  (12/21/2024)    Interpersonal Safety     Do you feel physically and emotionally safe where you currently live?: Yes     Within the past 12 months, have you been hit, slapped, kicked or otherwise physically hurt by someone?: No     Within the past 12 months, have you been humiliated or emotionally abused in other ways by your partner or ex-partner?: No   Recent Concern: Interpersonal Safety - High Risk (10/24/2024)    Interpersonal Safety     Do you feel physically and emotionally safe where you currently live?: No     Within the past 12 months, have you been hit, slapped, kicked or otherwise physically hurt by someone?: No     Within the past 12 months, have you been humiliated or emotionally abused in other ways by your partner or ex-partner?: No    Received [facility] [employer] [facility] [employer] [email or web address]    Social Connections          Disposition Plan     Medically Ready for Discharge: Anticipated Today           The patient's care was discussed with the Attending Physician, Dr. Venu Leal .    Roro Gomes, [facility]  Hospitalist Service, GOLD TEAM [address] [place] [address]  Securely message with [employer] (more info)  Text page via [facility] Paging/Directory   See signed in provider for up to date coverage information  ______________________________________________________________________    Interval History   Andrew is resting in bed.  Reports ongoing pain 8/10.  Says he has not had a BM in 2-3 days.  No vomiting.   Tolerating small amounts of food.  No chest pain or dyspnea.       Physical Exam   Vital Signs: Temp: 99.1  F (37.3  C) Temp src: Oral BP: (!) 131/92 Pulse: 91   Resp: 16 SpO2: 98 % O2 Device: None (Room air)    Weight: 146 lbs 0 oz    GENERAL: Alert and oriented x 3. Well nourished, well developed.  Petite body habitus.  No acute distress.    HEENT: Normocephalic, atraumatic. Anicteric sclera. Mucous membranes moist.   CV: RRR. S1, S2. No murmurs appreciated.   RESPIRATORY: Effort normal on room air. Lungs CTAB with no wheezing, rales, or rhonchi.   GI: Abdomen soft and non distended, bowel sounds present x all 4 quadrants.  Abdominal wound covered and dressed with gauze and abdominal pad.     NEUROLOGICAL: No focal deficits. Follows commands.  Strength equal in upper and lower extremities.   MUSCULOSKELETAL: No joint swelling or tenderness. Moves all extremities.   EXTREMITIES: No gross deformities. No peripheral edema.   SKIN: Grossly warm, dry, and intact. No jaundice. No rashes.       Medical Decision Making       50 MINUTES SPENT BY ME on the date of service doing chart review, history, exam, documentation & further activities per the note.      Data         Imaging results reviewed over the past 24 hrs:   No results found for this or any previous visit (from the past 24 hours).

## 2025-01-27 NOTE — PROGRESS NOTES
Admitted/transferred from:   2 RN skin assessment completed by Mark Hamilton, RN and Stella RN.  Skin assessment finding: Abd midline surgical wound from 12-20-24  Interventions/actions: Top portion of dressing changed with ABD

## 2025-01-28 LAB
ANION GAP SERPL CALCULATED.3IONS-SCNC: 9 MMOL/L (ref 7–15)
BUN SERPL-MCNC: 14.6 MG/DL (ref 8–23)
CALCIUM SERPL-MCNC: 9.1 MG/DL (ref 8.8–10.4)
CHLORIDE SERPL-SCNC: 96 MMOL/L (ref 98–107)
CREAT SERPL-MCNC: 0.67 MG/DL (ref 0.67–1.17)
EGFRCR SERPLBLD CKD-EPI 2021: >90 ML/MIN/1.73M2
ERYTHROCYTE [DISTWIDTH] IN BLOOD BY AUTOMATED COUNT: 14.2 % (ref 10–15)
GLUCOSE BLDC GLUCOMTR-MCNC: 145 MG/DL (ref 70–99)
GLUCOSE BLDC GLUCOMTR-MCNC: 146 MG/DL (ref 70–99)
GLUCOSE BLDC GLUCOMTR-MCNC: 159 MG/DL (ref 70–99)
GLUCOSE BLDC GLUCOMTR-MCNC: 178 MG/DL (ref 70–99)
GLUCOSE BLDC GLUCOMTR-MCNC: 216 MG/DL (ref 70–99)
GLUCOSE SERPL-MCNC: 169 MG/DL (ref 70–99)
HCO3 SERPL-SCNC: 25 MMOL/L (ref 22–29)
HCT VFR BLD AUTO: 26.1 % (ref 40–53)
HGB BLD-MCNC: 8.3 G/DL (ref 13.3–17.7)
MAGNESIUM SERPL-MCNC: 1.6 MG/DL (ref 1.7–2.3)
MCH RBC QN AUTO: 25.9 PG (ref 26.5–33)
MCHC RBC AUTO-ENTMCNC: 31.8 G/DL (ref 31.5–36.5)
MCV RBC AUTO: 82 FL (ref 78–100)
PLATELET # BLD AUTO: 484 10E3/UL (ref 150–450)
POTASSIUM SERPL-SCNC: 3.9 MMOL/L (ref 3.4–5.3)
RBC # BLD AUTO: 3.2 10E6/UL (ref 4.4–5.9)
SODIUM SERPL-SCNC: 130 MMOL/L (ref 135–145)
WBC # BLD AUTO: 7.4 10E3/UL (ref 4–11)

## 2025-01-28 PROCEDURE — 250N000013 HC RX MED GY IP 250 OP 250 PS 637: Performed by: PHYSICIAN ASSISTANT

## 2025-01-28 PROCEDURE — 93005 ELECTROCARDIOGRAM TRACING: CPT

## 2025-01-28 PROCEDURE — 255N000002 HC RX 255 OP 636: Performed by: STUDENT IN AN ORGANIZED HEALTH CARE EDUCATION/TRAINING PROGRAM

## 2025-01-28 PROCEDURE — 85014 HEMATOCRIT: CPT

## 2025-01-28 PROCEDURE — 36415 COLL VENOUS BLD VENIPUNCTURE: CPT

## 2025-01-28 PROCEDURE — 250N000013 HC RX MED GY IP 250 OP 250 PS 637: Performed by: NURSE PRACTITIONER

## 2025-01-28 PROCEDURE — 999N000248 HC STATISTIC IV INSERT WITH US BY RN

## 2025-01-28 PROCEDURE — 250N000011 HC RX IP 250 OP 636: Performed by: STUDENT IN AN ORGANIZED HEALTH CARE EDUCATION/TRAINING PROGRAM

## 2025-01-28 PROCEDURE — 250N000009 HC RX 250: Performed by: STUDENT IN AN ORGANIZED HEALTH CARE EDUCATION/TRAINING PROGRAM

## 2025-01-28 PROCEDURE — 93010 ELECTROCARDIOGRAM REPORT: CPT | Performed by: INTERNAL MEDICINE

## 2025-01-28 PROCEDURE — 82310 ASSAY OF CALCIUM: CPT

## 2025-01-28 PROCEDURE — 120N000002 HC R&B MED SURG/OB UMMC

## 2025-01-28 PROCEDURE — 80048 BASIC METABOLIC PNL TOTAL CA: CPT

## 2025-01-28 PROCEDURE — 99233 SBSQ HOSP IP/OBS HIGH 50: CPT | Mod: FS | Performed by: STUDENT IN AN ORGANIZED HEALTH CARE EDUCATION/TRAINING PROGRAM

## 2025-01-28 PROCEDURE — 85041 AUTOMATED RBC COUNT: CPT

## 2025-01-28 PROCEDURE — 83735 ASSAY OF MAGNESIUM: CPT | Performed by: PHYSICIAN ASSISTANT

## 2025-01-28 PROCEDURE — 99207 PR APP CREDIT; MD BILLING SHARED VISIT: CPT | Performed by: NURSE PRACTITIONER

## 2025-01-28 PROCEDURE — 250N000013 HC RX MED GY IP 250 OP 250 PS 637: Performed by: STUDENT IN AN ORGANIZED HEALTH CARE EDUCATION/TRAINING PROGRAM

## 2025-01-28 PROCEDURE — 250N000013 HC RX MED GY IP 250 OP 250 PS 637

## 2025-01-28 PROCEDURE — 250N000011 HC RX IP 250 OP 636: Performed by: PHYSICIAN ASSISTANT

## 2025-01-28 RX ORDER — SIMETHICONE 80 MG
160 TABLET,CHEWABLE ORAL 3 TIMES DAILY
Status: DISCONTINUED | OUTPATIENT
Start: 2025-01-28 | End: 2025-02-03 | Stop reason: HOSPADM

## 2025-01-28 RX ORDER — CALCIUM CARBONATE 500 MG/1
1000 TABLET, CHEWABLE ORAL ONCE
Status: COMPLETED | OUTPATIENT
Start: 2025-01-28 | End: 2025-01-28

## 2025-01-28 RX ORDER — BISACODYL 10 MG
10 SUPPOSITORY, RECTAL RECTAL ONCE
Status: COMPLETED | OUTPATIENT
Start: 2025-01-28 | End: 2025-01-28

## 2025-01-28 RX ADMIN — DIATRIZOATE MEGLUMINE AND DIATRIZOATE SODIUM 150 ML: 660; 100 LIQUID ORAL; RECTAL at 18:49

## 2025-01-28 RX ADMIN — POLYETHYLENE GLYCOL 3350 17 G: 17 POWDER, FOR SOLUTION ORAL at 08:15

## 2025-01-28 RX ADMIN — ANTACID TABLETS 1000 MG: 500 TABLET, CHEWABLE ORAL at 11:21

## 2025-01-28 RX ADMIN — BICTEGRAVIR SODIUM, EMTRICITABINE, AND TENOFOVIR ALAFENAMIDE FUMARATE 1 TABLET: 50; 200; 25 TABLET ORAL at 08:14

## 2025-01-28 RX ADMIN — SENNOSIDES 8.6 MG: 8.6 TABLET ORAL at 20:47

## 2025-01-28 RX ADMIN — ACETAMINOPHEN 650 MG: 325 TABLET, FILM COATED ORAL at 01:58

## 2025-01-28 RX ADMIN — POLYETHYLENE GLYCOL 3350 17 G: 17 POWDER, FOR SOLUTION ORAL at 20:48

## 2025-01-28 RX ADMIN — SENNOSIDES 8.6 MG: 8.6 TABLET ORAL at 08:14

## 2025-01-28 RX ADMIN — BISACODYL 10 MG: 10 SUPPOSITORY RECTAL at 11:21

## 2025-01-28 RX ADMIN — OXYCODONE HYDROCHLORIDE 5 MG: 5 TABLET ORAL at 01:36

## 2025-01-28 RX ADMIN — MAGNESIUM HYDROXIDE 30 ML: 400 SUSPENSION ORAL at 11:21

## 2025-01-28 RX ADMIN — INSULIN GLARGINE 15 UNITS: 100 INJECTION, SOLUTION SUBCUTANEOUS at 08:26

## 2025-01-28 RX ADMIN — HYDROMORPHONE HYDROCHLORIDE 0.5 MG: 1 INJECTION, SOLUTION INTRAMUSCULAR; INTRAVENOUS; SUBCUTANEOUS at 08:14

## 2025-01-28 RX ADMIN — OXYCODONE HYDROCHLORIDE 5 MG: 5 TABLET ORAL at 05:59

## 2025-01-28 RX ADMIN — OXYCODONE HYDROCHLORIDE 5 MG: 5 TABLET ORAL at 13:07

## 2025-01-28 RX ADMIN — SIMETHICONE CHEW TAB 80 MG 160 MG: 80 TABLET ORAL at 20:47

## 2025-01-28 RX ADMIN — HYDROMORPHONE HYDROCHLORIDE 0.5 MG: 1 INJECTION, SOLUTION INTRAMUSCULAR; INTRAVENOUS; SUBCUTANEOUS at 23:03

## 2025-01-28 RX ADMIN — SIMETHICONE 80 MG: 80 TABLET, CHEWABLE ORAL at 04:07

## 2025-01-28 RX ADMIN — HYDROMORPHONE HYDROCHLORIDE 0.5 MG: 1 INJECTION, SOLUTION INTRAMUSCULAR; INTRAVENOUS; SUBCUTANEOUS at 14:19

## 2025-01-28 RX ADMIN — SIMETHICONE CHEW TAB 80 MG 160 MG: 80 TABLET ORAL at 13:06

## 2025-01-28 RX ADMIN — OXYCODONE HYDROCHLORIDE 5 MG: 5 TABLET ORAL at 17:07

## 2025-01-28 RX ADMIN — ACETAMINOPHEN 650 MG: 325 TABLET, FILM COATED ORAL at 05:59

## 2025-01-28 RX ADMIN — HYDROMORPHONE HYDROCHLORIDE 0.5 MG: 1 INJECTION, SOLUTION INTRAMUSCULAR; INTRAVENOUS; SUBCUTANEOUS at 04:07

## 2025-01-28 RX ADMIN — ACETAMINOPHEN 650 MG: 325 TABLET, FILM COATED ORAL at 22:08

## 2025-01-28 RX ADMIN — HYDROMORPHONE HYDROCHLORIDE 0.5 MG: 1 INJECTION, SOLUTION INTRAMUSCULAR; INTRAVENOUS; SUBCUTANEOUS at 18:57

## 2025-01-28 RX ADMIN — OXYCODONE HYDROCHLORIDE 5 MG: 5 TABLET ORAL at 22:08

## 2025-01-28 ASSESSMENT — ACTIVITIES OF DAILY LIVING (ADL)
ADLS_ACUITY_SCORE: 33
ADLS_ACUITY_SCORE: 34
ADLS_ACUITY_SCORE: 33
ADLS_ACUITY_SCORE: 34
ADLS_ACUITY_SCORE: 33
ADLS_ACUITY_SCORE: 34
ADLS_ACUITY_SCORE: 34
ADLS_ACUITY_SCORE: 33
ADLS_ACUITY_SCORE: 34
ADLS_ACUITY_SCORE: 33
ADLS_ACUITY_SCORE: 33
ADLS_ACUITY_SCORE: 34
ADLS_ACUITY_SCORE: 33
ADLS_ACUITY_SCORE: 33
ADLS_ACUITY_SCORE: 34
ADLS_ACUITY_SCORE: 34
ADLS_ACUITY_SCORE: 33
ADLS_ACUITY_SCORE: 34
ADLS_ACUITY_SCORE: 33
DEPENDENT_IADLS:: INDEPENDENT
ADLS_ACUITY_SCORE: 33
ADLS_ACUITY_SCORE: 34

## 2025-01-28 NOTE — PLAN OF CARE
Goal Outcome Evaluation:      Plan of Care Reviewed With: patient    Overall Patient Progress: no changeOverall Patient Progress: no change    Outcome Evaluation: A&Ox4. Able to make needs known. Able to use a call light. BG ACHS. Independent in the room. Voids spontaneously not saving. No BM reported. Pain managed per MAR with moderate pain control. Pt denies cardiac chest pain. Pt denies N/V.

## 2025-01-28 NOTE — PLAN OF CARE
Goal Outcome Evaluation:      Plan of Care Reviewed With: patient    Overall Patient Progress: no change    Neuro: A/Ox4 able to make needs known  Respiratory: on room air denies SOB  Cardiac: WDL denies cardiac chest pain.   Diet: Regular tolerating with good appetite   GI/: +BS no BM this shift gave scheduled bowel meds and suppository without result.  Voiding without difficulties.   Incision/Drains: midline abdominal incision dressing CDI.   IV Access: left PIV SL   Pain: reports abdominal pain managed with oral oxycodone and IV dilaudid effective.  Labs: reviewed. AM labs ordered for replacement protocol  Activity: up ad lexi in room.  Plan: possible discharge tomorrow. Continues POC.

## 2025-01-28 NOTE — PROGRESS NOTES
Surgery Progress Note  01/28/2025       Subjective:  - Patient awake at bedside.  Says he is tolerating diet without vomiting. Denies flatus or bowel movement. Feels somewhat bloated, not nauseated.  Breathing without issue     Objective:  Temp:  [98.6  F (37  C)-98.7  F (37.1  C)] 98.7  F (37.1  C)  Pulse:  [96] 96  Resp:  [16-18] 16  BP: (127-128)/(83-84) 127/84  SpO2:  [95 %-98 %] 95 %    I/O last 3 completed shifts:  In: 503 [P.O.:500; I.V.:3]  Out: -       Gen: Awake, alert, NAD  CV: RRR  Resp: NLB on RA  Abd: soft, nondistended, nontender, Open wound with sloughing fibrinous material in superior aspect of incision. Granulating tissue in lower aspect of incision. No erythema evident.  I changed the dressings at bedside (9am)  Ext: WWP, no edema     Labs:  Recent Labs   Lab 01/28/25  0713 01/26/25  0613 01/25/25  0806   WBC 7.4 8.1 8.7   HGB 8.3* 8.0* 8.0*   * 511* 437       Recent Labs   Lab 01/28/25  0824 01/28/25  0713 01/28/25  0154 01/26/25  0806 01/26/25  0613 01/25/25  1118 01/25/25  0806 01/21/25  1746 01/21/25  1241   NA  --  130*  --   --  131*  --  134*   < > 132*   POTASSIUM  --  3.9  --   --  3.6  --  3.8   < > 3.4   CHLORIDE  --  96*  --   --  99  --  103   < > 96*   CO2  --  25  --   --  24  --  23   < > 26   BUN  --  14.6  --   --  10.2  --  7.4*   < > 15.8   CR  --  0.67  --   --  0.66*  --  0.56*   < > 0.72   * 169* 159*   < > 119*   < > 116*   < > 104*   LINDA  --  9.1  --   --  8.8  --  8.4*   < > 9.2   MAG  --  1.6*  --   --  1.4*  --   --   --  2.0   PHOS  --   --   --   --   --   --   --   --  3.4    < > = values in this interval not displayed.       Imaging:no new                                                                           Assessment/Plan:   59 year old male with a past medical history of chronic abdominal pain, AIDS, diabetes mellitus type 2 and PTSD.  The patient underwent exploratory laparotomy and small bowel resection on December 20, 2024 for small bowel  obstruction, with return to the OR on December 24, 2024 for anastomotic leak.  Currently tolerating a diet, though without bowel movement for a few days.    Wound seems to be slowly healing, no sign of active infection.  Granulating in slowly, but improved from prior.    Recommendations:  -continue Regular Diet  -Simethicone is fine  -Bowel Reg: Miralax BID, Senna BID, MOM every day.  Could consider enema if desired.  -Ambulate  -Dressing changes BID  -Ok to shower and get the incision wet (do not submerge underwater as in tub bathing)    Assuming wound care can be continued, the incision is not a barrier to discharge.

## 2025-01-28 NOTE — PLAN OF CARE
"Goal Outcome Evaluation:      Plan of Care Reviewed With: patient    Overall Patient Progress: no changeOverall Patient Progress: no change    1930-0730     /84   Pulse 96   Temp 98.7  F (37.1  C) (Oral)   Resp 16   Ht 1.626 m (5' 4\")   Wt 66.2 kg (146 lb)   SpO2 98%   BMI 25.06 kg/m      Activity: up ad lexi   Neuros: alert and orientated x 4, calm and cooperative   Cardiac: WNL   Respiratory: O2 sats mid 90's on RA, unlabored    GI/: abdomen tender.  No BM this shift.  Voiding spont   Diet: regular   Lines: PIV   Incisions/Drains: midline abdominal wound dressing intact    Labs: reviewed   Pain/nausea: \"partial\" pain control taking prn oxycodone and IV dilaudid    New changes this shift: pt showered overnight    Plan: continue POC               "

## 2025-01-28 NOTE — CONSULTS
Care Management Initial Consult    General Information  Assessment completed with: Patient,         Primary Care Provider verified and updated as needed:     Readmission within the last 30 days: previous discharge plan unsuccessful      Reason for Consult: psychosocial concerns (ERS, SDOH need)  Advance Care Planning: Advance Care Planning Reviewed: no concerns identified          Communication Assessment  Patient's communication style: spoken language (English or Bilingual)    Hearing Difficulty or Deaf: no   Wear Glasses or Blind: yes    Cognitive  Cognitive/Neuro/Behavioral: WDL  Level of Consciousness: alert                   Living Environment:   People in home: [facility])     Current living Arrangements: homeless      Able to return to prior arrangements: yes       Family/Social Support:  Care provided by: self  Provides care for: no one  Marital Status: Single  Support system: Other (specify) (did not assess)          Description of Support System: Other (see comments) (did not assess)    Support Assessment: Other (see comments) (did not assess)    Current Resources:   Patient receiving home care services: No        Community Resources: None  Equipment currently used at home: none  Supplies currently used at home: Diabetic Supplies, Wound Care Supplies    Employment/Financial:  Employment Status: unemployed        Financial Concerns: unemployed   Referral to Financial Worker: No       Does the patient's insurance plan have a 3 day qualifying hospital stay waiver?  No    Lifestyle & Psychosocial Needs:  Social Drivers of Health     Food Insecurity: High Risk (1/27/2025)    Food Insecurity     Within the past 12 months, did you worry that your food would run out before you got money to buy more?: Yes     Within the past 12 months, did the food you bought just not last and you didn t have money to get more?: Yes   Depression: Not at risk  (8/27/2020)    PHQ-2     PHQ-2 Score: 0   Housing Stability: High Risk (1/27/2025)    Housing Stability     Do you have housing? : No     Are you worried about losing your housing?: Yes   Tobacco Use: High Risk (1/21/2025)    Patient History     Smoking Tobacco Use: Some Days     Smokeless Tobacco Use: Former     Passive Exposure: Not on file   Financial Resource Strain: High Risk (1/27/2025)    Financial Resource Strain     Within the past 12 months, have you or your family members you live with been unable to get utilities (heat, electricity) when it was really needed?: Yes   Alcohol Use: Unknown (8/27/2020)    AUDIT-C     Frequency of Alcohol Consumption: Not on file     Average Number of Drinks: Patient declined     Frequency of Binge Drinking: Not on file   Transportation Needs: High Risk (1/27/2025)    Transportation Needs     Within the past 12 months, has lack of transportation kept you from medical appointments, getting your medicines, non-medical meetings or appointments, work, or from getting things that you need?: Yes   Physical Activity: Not on file   Interpersonal Safety: Low Risk  (1/27/2025)    Interpersonal Safety     Do you feel physically and emotionally safe where you currently live?: Yes     Within the past 12 months, have you been hit, slapped, kicked or otherwise physically hurt by someone?: No     Within the past 12 months, have you been humiliated or emotionally abused in other ways by your partner or ex-partner?: No   Stress: Not on file   Social Connections: Unknown (4/10/2023)    Received from [facility], [facility] [address] [facility]    Social Connections     Frequency of Communication with Friends and Family: Not on file   Health Literacy: Not on file       Functional Status:  Prior to admission patient needed assistance:   Dependent ADLs:: Independent  Dependent IADLs:: Independent       Mental Health Status:  Mental Health Status: Other  "(see comment) (did not assess)       Chemical Dependency Status:  Chemical Dependency Status: Other (see comment) (did not assess)             Values/Beliefs:  Spiritual, Cultural Beliefs, Adventist Practices, Values that affect care: no               Discussed  Partnership in Safe Discharge Planning  document with patient/family: No    Additional Information:  DAYNA consulted for ERS and SDOH need. DAYNA completed CMA via chart review as pt has had multiple hospital visits in the past month.     DAYNA updated pts PCP to reflect on his facesheet. Pts next PCP is 1/30 at 11:30 AM per chart review. If pt does not discharge tomorrow, this appointment will need to be rescheduled.     Pt came to the ED from our [facility]. DAYNA messaged Sandra De Jesus, RNCC at our [facility], to confirm. Sandra reported pt is able to return, however \"my biggest concern currently is his ability to complete his own wound care. I'm here M-F, and can help, but there is no medical staff here on the weekends so he would need to be able to do it himself.\" DAYNA also inquired about pt discharging on narcotic pain meds, Sandra reported this is okay as long as they are provided to him at discharge.     DAYNA met with pt at bedside to discuss discharge plan. Pt is agreeable to return to our [facility]. DAYNA talked with pt about increasing independence with wound cares, pt is agreeable to this. DAYNA messaged pts bedside RN to see if they were able to do teachings/document pts progress with completing wound cares on his own. Bedside RN will pass this message on to evening RN.    DAYNA messaged provider to see when pt is anticipated to be medically ready for discharge, but did not hear back. Per pt, he reports they are discharging him in two days.     1:45 PM: Provider messaged DAYNA and reported pt is anticipated to be med ready for discharge after he has a BM, so anticipate pt discharge back to our [facility] tomorrow. DAYNA messaged liaison Sandra to inquire " about wound care supplies, Sandra reported that pt needs to be discharged with 10 days of wound care supplies (specifically vashe and hydrofera blue, there is plenty of gauze and abdominal pads at our [facility]) and have a standing order for wound care supplies with a DME company. DAYNA messaged [facility] nurse to ask for 10 days of wound care supplies be sent with pt, who directed SW to bedside RN. DAYNA messaged bedside RN with this request.     Next Steps:   -Follow for safe discharge back to our [facility] medical [facility]  -Encourage pt to complete wound cares on his own.   -Standing wound care orders to [employer] company    CRISTAL Riley   [facility]  Available via [employer]  Covering 7B DAYNA  Ph: [phone]

## 2025-01-28 NOTE — PLAN OF CARE
Problem: Adult Inpatient Plan of Care  Goal: Plan of Care Review  Description: The Plan of Care Review/Shift note should be completed every shift.  The Outcome Evaluation is a brief statement about your assessment that the patient is improving, declining, or no change.  This information will be displayed automatically on your shift  note.  Flowsheets (Taken 1/28/2025 3699)  Outcome Evaluation: SW completed CMA via chart review. DAYNA met with pt at bedside to discuss discharge plan to our [facility]. Discussed pt needing to increase independence with wound cares. Pt in agreement  Plan of Care Reviewed With: patient

## 2025-01-28 NOTE — PROGRESS NOTES
[facility]    Medicine Progress Note - Hospitalist Service, GOLD TEAM 6    Date of Admission:  1/24/2025    Assessment & Plan     Andrew Wolfe is a 59 year old male with past medical history significant for chronic abdominal pain, type 2 DM, HIV, PTSD, SBO s/p ex lap with small bowel resection (12/20/24) with return to OR on 12/24/24 for anastomotic leak, chronic housing instability admitted for recurrent small bowel obstruction.     # Recurrent SBO   # SBO s/p ex lap w/ small bowel resection on 12/20 with return to OR on 12/24 for anastomotic leak  # Acute pain 2/2 above  Readmitted with nausea, vomiting and recurrent SBO on CT scan. NG deferred due to lack of vomiting.  Reports no BM in last 2 days.  Magnesium improved from 1.4 to 1.6.    - General surgery following, appreciate recs   - Advanced to regular diet 1/26  - Give suppository today   - Schedule simethicone 160mg TID   - Continue bowel regimen Miralax BID, Senna BID, MOM every day  - Oxycodone 4 mg every 4 hours prn, IV dilaudid 0.5 mg every 4 hours prn if unable to take oral or not responding to oral    # Hypomagnesemia   Mag improved to 1.6 today.   - Replacement protocol ordered      # Chronic housing instability  Has most recently been staying at [facility].  They are agreeable for him to return there when medically ready.      # Type 2 DM   Last Hgb A1c 10% on 1/21/25.  On Lantus 25 PTA, dose decreased here due to NPO and below baseline PO intake.  Last 24 hr BG as follows:   Recent Labs   Lab 01/28/25  1152 01/28/25  0824 01/28/25  0713 01/28/25  0154 01/27/25  2202 01/27/25  1635   * 146* 169* 159* 146* 157*   - Continue Lantus at decreased dose of 15 units daily for now   - Continue sliding scale insulin  - BG checks ac/hs   - Hypoglycemia protocol      # Abdominal wound 2/2 above   - WOCN consult     # History of HIV  - Continue PTA Biktarvy       Diet: Regular Diet Adult    DVT  "Prophylaxis: Pneumatic Compression Devices  Potts Catheter: Not present  Lines: None     Cardiac Monitoring: None  Code Status: Full Code      Clinically Significant Risk Factors         # Hyponatremia: Lowest Na = 130 mmol/L in last 2 days, will monitor as appropriate  # Hypochloremia: Lowest Cl = 96 mmol/L in last 2 days, will monitor as appropriate    # Hypomagnesemia: Lowest Mg = 1.6 mg/dL in last 2 days, will replace as needed   # Hypoalbuminemia: Lowest albumin = 3.1 g/dL at 1/24/2025 12:37 PM, will monitor as appropriate               # DMII: A1C = 10.0 % (Ref range: <5.7 %) within past 6 months   # Overweight: Estimated body mass index is 25.06 kg/m  as calculated from the following:    Height as of this encounter: 1.626 m (5' 4\").    Weight as of this encounter: 66.2 kg (146 lb)., PRESENT ON ADMISSION       # Financial/Environmental Concerns: unemployed  # Housing Instability: noted in nursing assessment         Social Drivers of Health    Food Insecurity: High Risk (1/27/2025)    Food Insecurity     Within the past 12 months, did you worry that your food would run out before you got money to buy more?: Yes     Within the past 12 months, did the food you bought just not last and you didn t have money to get more?: Yes   Housing Stability: High Risk (1/27/2025)    Housing Stability     Do you have housing? : No     Are you worried about losing your housing?: Yes   Tobacco Use: High Risk (1/21/2025)    Patient History     Smoking Tobacco Use: Some Days     Smokeless Tobacco Use: Former   Financial Resource Strain: High Risk (1/27/2025)    Financial Resource Strain     Within the past 12 months, have you or your family members you live with been unable to get utilities (heat, electricity) when it was really needed?: Yes   Alcohol Use: Unknown (8/27/2020)    AUDIT-C     Average Number of Drinks: Patient declined   Transportation Needs: High Risk (1/27/2025)    Transportation Needs     Within the past 12 months, " has lack of transportation kept you from medical appointments, getting your medicines, non-medical meetings or appointments, work, or from getting things that you need?: Yes    [facility]    Social Connections          Disposition Plan     Medically Ready for Discharge: Anticipated Today           The patient's care was discussed with the Attending Physician, Dr. Mariza Seay .    Roro Gomes [facility]  Hospitalist Service, [address]  Securely message with [employer] info)  Text page via [facility] Paging/Directory   See signed in provider for up to date coverage information  ______________________________________________________________________    Interval History   Andrew is resting in bed.  No BM last 24 hours.  Would like to try suppository.  Has been tolerating food and going for walks.  Reports gas pain.  No vomiting.      Physical Exam   Vital Signs: Temp: 98.7  F (37.1  C) Temp src: Oral BP: 127/84 Pulse: 96   Resp: 16 SpO2: 95 % O2 Device: None (Room air)    Weight: 146 lbs 0 oz    GENERAL: Alert and oriented x 3. Well nourished, well developed.  Petite body habitus.  No acute distress.    HEENT: Normocephalic, atraumatic. Anicteric sclera. Mucous membranes moist.   CV: RRR. S1, S2. No murmurs appreciated.   RESPIRATORY: Effort normal on room air. Lungs CTAB with no wheezing, rales, or rhonchi.   GI: Abdomen slightly distended, bowel sounds present x all 4 quadrants.  Abdominal wound covered and dressed with gauze and abdominal pad.     NEUROLOGICAL: No focal deficits. Follows commands.  Strength equal in upper and lower extremities.   MUSCULOSKELETAL: No joint swelling or tenderness. Moves all extremities.   EXTREMITIES: No gross deformities. No peripheral edema.   SKIN: Grossly warm, dry, and intact. No jaundice. No rashes.       Medical Decision Making        50 MINUTES SPENT BY ME on the date of service doing chart review, history, exam, documentation & further activities per the note.      Data     I have personally reviewed the following data over the past 24 hrs:    7.4  \   8.3 (L)   / 484 (H)     130 (L) 96 (L) 14.6 /  178 (H)   3.9 25 0.67 \       Imaging results reviewed over the past 24 hrs:   No results found for this or any previous visit (from the past 24 hours).

## 2025-01-29 LAB
ANION GAP SERPL CALCULATED.3IONS-SCNC: 10 MMOL/L (ref 7–15)
ATRIAL RATE - MUSE: 88 BPM
ATRIAL RATE - MUSE: 97 BPM
BACTERIA BLD CULT: NO GROWTH
BUN SERPL-MCNC: 14.5 MG/DL (ref 8–23)
CALCIUM SERPL-MCNC: 9.5 MG/DL (ref 8.8–10.4)
CHLORIDE SERPL-SCNC: 96 MMOL/L (ref 98–107)
CREAT SERPL-MCNC: 0.58 MG/DL (ref 0.67–1.17)
DIASTOLIC BLOOD PRESSURE - MUSE: NORMAL MMHG
DIASTOLIC BLOOD PRESSURE - MUSE: NORMAL MMHG
EGFRCR SERPLBLD CKD-EPI 2021: >90 ML/MIN/1.73M2
ERYTHROCYTE [DISTWIDTH] IN BLOOD BY AUTOMATED COUNT: 14.6 % (ref 10–15)
ERYTHROCYTE [DISTWIDTH] IN BLOOD BY AUTOMATED COUNT: 14.6 % (ref 10–15)
GLUCOSE BLDC GLUCOMTR-MCNC: 162 MG/DL (ref 70–99)
GLUCOSE BLDC GLUCOMTR-MCNC: 163 MG/DL (ref 70–99)
GLUCOSE BLDC GLUCOMTR-MCNC: 200 MG/DL (ref 70–99)
GLUCOSE BLDC GLUCOMTR-MCNC: 212 MG/DL (ref 70–99)
GLUCOSE BLDC GLUCOMTR-MCNC: 220 MG/DL (ref 70–99)
GLUCOSE BLDC GLUCOMTR-MCNC: 226 MG/DL (ref 70–99)
GLUCOSE SERPL-MCNC: 221 MG/DL (ref 70–99)
HCO3 SERPL-SCNC: 24 MMOL/L (ref 22–29)
HCT VFR BLD AUTO: 27.9 % (ref 40–53)
HCT VFR BLD AUTO: 28.2 % (ref 40–53)
HGB BLD-MCNC: 9.3 G/DL (ref 13.3–17.7)
HGB BLD-MCNC: 9.5 G/DL (ref 13.3–17.7)
HOLD SPECIMEN: NORMAL
INTERPRETATION ECG - MUSE: NORMAL
INTERPRETATION ECG - MUSE: NORMAL
LVEF ECHO: NORMAL
MAGNESIUM SERPL-MCNC: 1.7 MG/DL (ref 1.7–2.3)
MCH RBC QN AUTO: 26.6 PG (ref 26.5–33)
MCH RBC QN AUTO: 26.6 PG (ref 26.5–33)
MCHC RBC AUTO-ENTMCNC: 33.3 G/DL (ref 31.5–36.5)
MCHC RBC AUTO-ENTMCNC: 33.7 G/DL (ref 31.5–36.5)
MCV RBC AUTO: 79 FL (ref 78–100)
MCV RBC AUTO: 80 FL (ref 78–100)
P AXIS - MUSE: 28 DEGREES
P AXIS - MUSE: 28 DEGREES
PHOSPHATE SERPL-MCNC: 3.4 MG/DL (ref 2.5–4.5)
PLATELET # BLD AUTO: 515 10E3/UL (ref 150–450)
PLATELET # BLD AUTO: 535 10E3/UL (ref 150–450)
POTASSIUM SERPL-SCNC: 3.8 MMOL/L (ref 3.4–5.3)
PR INTERVAL - MUSE: 118 MS
PR INTERVAL - MUSE: 132 MS
QRS DURATION - MUSE: 84 MS
QRS DURATION - MUSE: 86 MS
QT - MUSE: 346 MS
QT - MUSE: 348 MS
QTC - MUSE: 418 MS
QTC - MUSE: 441 MS
R AXIS - MUSE: -23 DEGREES
R AXIS - MUSE: -39 DEGREES
RBC # BLD AUTO: 3.5 10E6/UL (ref 4.4–5.9)
RBC # BLD AUTO: 3.57 10E6/UL (ref 4.4–5.9)
SODIUM SERPL-SCNC: 130 MMOL/L (ref 135–145)
SYSTOLIC BLOOD PRESSURE - MUSE: NORMAL MMHG
SYSTOLIC BLOOD PRESSURE - MUSE: NORMAL MMHG
T AXIS - MUSE: 47 DEGREES
T AXIS - MUSE: 63 DEGREES
TROPONIN T SERPL HS-MCNC: 264 NG/L
TROPONIN T SERPL HS-MCNC: 309 NG/L
TROPONIN T SERPL HS-MCNC: 318 NG/L
TROPONIN T SERPL HS-MCNC: 34 NG/L
TROPONIN T SERPL HS-MCNC: 53 NG/L
TROPONIN T SERPL HS-MCNC: 72 NG/L
UFH PPP CHRO-ACNC: 0.12 IU/ML
VENTRICULAR RATE- MUSE: 88 BPM
VENTRICULAR RATE- MUSE: 97 BPM
WBC # BLD AUTO: 9.1 10E3/UL (ref 4–11)
WBC # BLD AUTO: 9.8 10E3/UL (ref 4–11)

## 2025-01-29 PROCEDURE — 250N000013 HC RX MED GY IP 250 OP 250 PS 637: Performed by: PHYSICIAN ASSISTANT

## 2025-01-29 PROCEDURE — 36415 COLL VENOUS BLD VENIPUNCTURE: CPT | Performed by: STUDENT IN AN ORGANIZED HEALTH CARE EDUCATION/TRAINING PROGRAM

## 2025-01-29 PROCEDURE — 250N000011 HC RX IP 250 OP 636: Performed by: STUDENT IN AN ORGANIZED HEALTH CARE EDUCATION/TRAINING PROGRAM

## 2025-01-29 PROCEDURE — 99207 PR NO BILLABLE SERVICE THIS VISIT: CPT | Performed by: INTERNAL MEDICINE

## 2025-01-29 PROCEDURE — 250N000013 HC RX MED GY IP 250 OP 250 PS 637: Performed by: NURSE PRACTITIONER

## 2025-01-29 PROCEDURE — 99233 SBSQ HOSP IP/OBS HIGH 50: CPT | Mod: FS | Performed by: STUDENT IN AN ORGANIZED HEALTH CARE EDUCATION/TRAINING PROGRAM

## 2025-01-29 PROCEDURE — 85520 HEPARIN ASSAY: CPT | Performed by: PHYSICIAN ASSISTANT

## 2025-01-29 PROCEDURE — 99254 IP/OBS CNSLTJ NEW/EST MOD 60: CPT | Performed by: PSYCHIATRY & NEUROLOGY

## 2025-01-29 PROCEDURE — 84484 ASSAY OF TROPONIN QUANT: CPT | Performed by: STUDENT IN AN ORGANIZED HEALTH CARE EDUCATION/TRAINING PROGRAM

## 2025-01-29 PROCEDURE — 36415 COLL VENOUS BLD VENIPUNCTURE: CPT

## 2025-01-29 PROCEDURE — 36415 COLL VENOUS BLD VENIPUNCTURE: CPT | Performed by: PHYSICIAN ASSISTANT

## 2025-01-29 PROCEDURE — 80051 ELECTROLYTE PANEL: CPT

## 2025-01-29 PROCEDURE — 84484 ASSAY OF TROPONIN QUANT: CPT | Performed by: PHYSICIAN ASSISTANT

## 2025-01-29 PROCEDURE — 99418 PROLNG IP/OBS E/M EA 15 MIN: CPT | Mod: FS | Performed by: STUDENT IN AN ORGANIZED HEALTH CARE EDUCATION/TRAINING PROGRAM

## 2025-01-29 PROCEDURE — 82565 ASSAY OF CREATININE: CPT

## 2025-01-29 PROCEDURE — 85520 HEPARIN ASSAY: CPT | Performed by: STUDENT IN AN ORGANIZED HEALTH CARE EDUCATION/TRAINING PROGRAM

## 2025-01-29 PROCEDURE — 250N000013 HC RX MED GY IP 250 OP 250 PS 637: Performed by: INTERNAL MEDICINE

## 2025-01-29 PROCEDURE — 250N000013 HC RX MED GY IP 250 OP 250 PS 637: Performed by: STUDENT IN AN ORGANIZED HEALTH CARE EDUCATION/TRAINING PROGRAM

## 2025-01-29 PROCEDURE — 84100 ASSAY OF PHOSPHORUS: CPT

## 2025-01-29 PROCEDURE — 80048 BASIC METABOLIC PNL TOTAL CA: CPT

## 2025-01-29 PROCEDURE — 36415 COLL VENOUS BLD VENIPUNCTURE: CPT | Performed by: INTERNAL MEDICINE

## 2025-01-29 PROCEDURE — 84484 ASSAY OF TROPONIN QUANT: CPT | Performed by: INTERNAL MEDICINE

## 2025-01-29 PROCEDURE — 93005 ELECTROCARDIOGRAM TRACING: CPT

## 2025-01-29 PROCEDURE — 93010 ELECTROCARDIOGRAM REPORT: CPT | Performed by: INTERNAL MEDICINE

## 2025-01-29 PROCEDURE — 85027 COMPLETE CBC AUTOMATED: CPT | Performed by: INTERNAL MEDICINE

## 2025-01-29 PROCEDURE — 250N000011 HC RX IP 250 OP 636: Performed by: INTERNAL MEDICINE

## 2025-01-29 PROCEDURE — 85027 COMPLETE CBC AUTOMATED: CPT

## 2025-01-29 PROCEDURE — 83735 ASSAY OF MAGNESIUM: CPT

## 2025-01-29 PROCEDURE — 120N000005 HC R&B MS OVERFLOW UMMC

## 2025-01-29 PROCEDURE — 99207 PR APP CREDIT; MD BILLING SHARED VISIT: CPT | Mod: FS | Performed by: NURSE PRACTITIONER

## 2025-01-29 RX ORDER — ASPIRIN 81 MG/1
81 TABLET, CHEWABLE ORAL DAILY
Status: DISCONTINUED | OUTPATIENT
Start: 2025-01-30 | End: 2025-01-29

## 2025-01-29 RX ORDER — PANTOPRAZOLE SODIUM 40 MG/1
40 TABLET, DELAYED RELEASE ORAL
Status: DISCONTINUED | OUTPATIENT
Start: 2025-01-29 | End: 2025-02-03 | Stop reason: HOSPADM

## 2025-01-29 RX ORDER — HEPARIN SODIUM 10000 [USP'U]/100ML
0-5000 INJECTION, SOLUTION INTRAVENOUS CONTINUOUS
Status: DISCONTINUED | OUTPATIENT
Start: 2025-01-29 | End: 2025-01-30

## 2025-01-29 RX ORDER — ATORVASTATIN CALCIUM 40 MG/1
40 TABLET, FILM COATED ORAL EVERY EVENING
Status: DISCONTINUED | OUTPATIENT
Start: 2025-01-29 | End: 2025-02-03 | Stop reason: HOSPADM

## 2025-01-29 RX ORDER — ASPIRIN 325 MG
325 TABLET ORAL ONCE
Status: COMPLETED | OUTPATIENT
Start: 2025-01-29 | End: 2025-01-29

## 2025-01-29 RX ORDER — ASPIRIN 81 MG/1
81 TABLET, CHEWABLE ORAL DAILY
Status: DISCONTINUED | OUTPATIENT
Start: 2025-01-30 | End: 2025-02-03 | Stop reason: HOSPADM

## 2025-01-29 RX ORDER — ASPIRIN 81 MG/1
81 TABLET, CHEWABLE ORAL ONCE
Status: COMPLETED | OUTPATIENT
Start: 2025-01-29 | End: 2025-01-29

## 2025-01-29 RX ORDER — NITROGLYCERIN 0.4 MG/1
0.4 TABLET SUBLINGUAL EVERY 5 MIN PRN
Status: DISCONTINUED | OUTPATIENT
Start: 2025-01-29 | End: 2025-02-03 | Stop reason: HOSPADM

## 2025-01-29 RX ADMIN — Medication 12.5 MG: at 10:23

## 2025-01-29 RX ADMIN — PANTOPRAZOLE SODIUM 40 MG: 40 TABLET, DELAYED RELEASE ORAL at 02:47

## 2025-01-29 RX ADMIN — NITROGLYCERIN 0.4 MG: 0.4 TABLET SUBLINGUAL at 01:39

## 2025-01-29 RX ADMIN — ASPIRIN 81 MG CHEWABLE TABLET 81 MG: 81 TABLET CHEWABLE at 02:47

## 2025-01-29 RX ADMIN — PANTOPRAZOLE SODIUM 40 MG: 40 TABLET, DELAYED RELEASE ORAL at 10:23

## 2025-01-29 RX ADMIN — HYDROMORPHONE HYDROCHLORIDE 0.5 MG: 1 INJECTION, SOLUTION INTRAMUSCULAR; INTRAVENOUS; SUBCUTANEOUS at 08:36

## 2025-01-29 RX ADMIN — ASPIRIN 325 MG ORAL TABLET 325 MG: 325 PILL ORAL at 04:38

## 2025-01-29 RX ADMIN — Medication 12.5 MG: at 20:49

## 2025-01-29 RX ADMIN — CALCIUM CARBONATE (ANTACID) CHEW TAB 500 MG 1000 MG: 500 CHEW TAB at 23:05

## 2025-01-29 RX ADMIN — HYDROMORPHONE HYDROCHLORIDE 0.5 MG: 1 INJECTION, SOLUTION INTRAMUSCULAR; INTRAVENOUS; SUBCUTANEOUS at 13:27

## 2025-01-29 RX ADMIN — PANTOPRAZOLE SODIUM 40 MG: 40 TABLET, DELAYED RELEASE ORAL at 16:57

## 2025-01-29 RX ADMIN — NITROGLYCERIN 0.4 MG: 0.4 TABLET SUBLINGUAL at 02:48

## 2025-01-29 RX ADMIN — ATORVASTATIN CALCIUM 40 MG: 40 TABLET, FILM COATED ORAL at 04:38

## 2025-01-29 RX ADMIN — BICTEGRAVIR SODIUM, EMTRICITABINE, AND TENOFOVIR ALAFENAMIDE FUMARATE 1 TABLET: 50; 200; 25 TABLET ORAL at 10:27

## 2025-01-29 RX ADMIN — SIMETHICONE CHEW TAB 80 MG 160 MG: 80 TABLET ORAL at 10:23

## 2025-01-29 RX ADMIN — ATORVASTATIN CALCIUM 40 MG: 40 TABLET, FILM COATED ORAL at 20:44

## 2025-01-29 RX ADMIN — HYDROMORPHONE HYDROCHLORIDE 0.5 MG: 1 INJECTION, SOLUTION INTRAMUSCULAR; INTRAVENOUS; SUBCUTANEOUS at 21:40

## 2025-01-29 RX ADMIN — SIMETHICONE CHEW TAB 80 MG 160 MG: 80 TABLET ORAL at 20:44

## 2025-01-29 RX ADMIN — CALCIUM CARBONATE (ANTACID) CHEW TAB 500 MG 1000 MG: 500 CHEW TAB at 01:01

## 2025-01-29 RX ADMIN — OXYCODONE HYDROCHLORIDE 5 MG: 5 TABLET ORAL at 19:15

## 2025-01-29 RX ADMIN — HEPARIN SODIUM 800 UNITS/HR: 10000 INJECTION, SOLUTION INTRAVENOUS at 07:59

## 2025-01-29 RX ADMIN — HYDROMORPHONE HYDROCHLORIDE 0.5 MG: 1 INJECTION, SOLUTION INTRAMUSCULAR; INTRAVENOUS; SUBCUTANEOUS at 17:35

## 2025-01-29 RX ADMIN — INSULIN GLARGINE 15 UNITS: 100 INJECTION, SOLUTION SUBCUTANEOUS at 10:23

## 2025-01-29 RX ADMIN — OXYCODONE HYDROCHLORIDE 5 MG: 5 TABLET ORAL at 23:38

## 2025-01-29 RX ADMIN — SIMETHICONE CHEW TAB 80 MG 160 MG: 80 TABLET ORAL at 13:27

## 2025-01-29 ASSESSMENT — ACTIVITIES OF DAILY LIVING (ADL)
ADLS_ACUITY_SCORE: 38
ADLS_ACUITY_SCORE: 34
ADLS_ACUITY_SCORE: 38
ADLS_ACUITY_SCORE: 34
ADLS_ACUITY_SCORE: 38
ADLS_ACUITY_SCORE: 34
ADLS_ACUITY_SCORE: 38

## 2025-01-29 NOTE — PLAN OF CARE
"BP (!) 141/95   Pulse 96   Temp 96.9  F (36.1  C) (Axillary)   Resp 17   Ht 1.626 m (5' 4\")   Wt 66.2 kg (146 lb)   SpO2 100%   BMI 25.06 kg/m      Goal Outcome Evaluation:      Plan of Care Reviewed With: patient    Overall Patient Progress: no changeOverall Patient Progress: no change     A&Ox4, VSS on RA, Pain managed with PRN oxycodone and dilaudid, PIV SL, Abdominal wound cares done per order -- dressing CDI, Denies nausea, Abdominal discomfort managed with PRNs and scheduled bowel meds, Pt reports 2 loose BM, Voiding spontaneously, Up ad lexi, Regular diet, Labs reviewed.    Pt complaining of L sided chest/should pain. MD notified and aware. ECG and Troponin ordered.      "

## 2025-01-29 NOTE — PROGRESS NOTES
[facility]    Medicine Progress Note - Hospitalist Service, GOLD TEAM 6    Date of Admission:  1/24/2025    Assessment & Plan     Andrew Wolfe is a 59 year old male with past medical history significant for chronic abdominal pain, type 2 DM, HIV, PTSD, SBO s/p ex lap with small bowel resection (12/20/24) with return to OR on 12/24/24 for anastomotic leak, chronic housing instability admitted for recurrent small bowel obstruction.     # ACS, likely type 1 NSTEMI  # Intermittent chest pain  # HFrEF   Developed sensation of crushing mid-sternal chest pain overnight 1/28-1/29.  EKG with non-specific ST abnormality in inferior leads and ST depression in inferiorly.  Trop at that time elevated to 32.  Repeat EKG with less prominent ST depression in lead I and pain relieved with nitroglycerin tabs.  Serial trops ordered and continued to uptrend, 34 --> 53 --> 72.  Cardiology consulted and recommended heparin gtt, ASA loading, statin, and beta blocker.  Trop continuing to rise, 264 on afternoon check.  Discussed with Cards, plan is for cath lab tomorrow.  TTE this AM with EF 45-50%, mildly decreased LV, LV apical hypokinesis, and mild aortic insufficiency.  - Appreciate Cards recs   - Monitor for recurrent chest pain  - Monitor vitals closely   - Awaiting transfer to    - If having chest pain this evening, please make NPO and notify Cards   - NPO at midnight for coronary angiogram tomorrow   - Continue heparin gtt  - Continue ASA daily   - Continue metoprolol 12.5mg BID      - Continue atorvastatin 40mg daily     # Recurrent SBO, improving    # SBO s/p ex lap w/ small bowel resection on 12/20 with return to OR on 12/24 for anastomotic leak  # Acute pain 2/2 above  Readmitted with nausea, vomiting and recurrent SBO on CT scan. NG deferred due to lack of vomiting.  Had a BM yesterday afternoon.    - General surgery following, appreciate recs   - Advanced to regular  "diet 1/26  - Continue scheduled simethicone 160mg TID   - Continue bowel regimen Miralax BID, Senna BID, MOM every day  - Oxycodone 4 mg every 4 hours prn, IV dilaudid 0.5 mg every 4 hours prn if unable to take oral or not responding to oral    # Hypomagnesemia   Mag improved.  - Replacement protocol ordered      # Chronic housing instability  Has most recently been staying at [facility].  They are agreeable for him to return there when medically ready.      # Type 2 DM   Last Hgb A1c 10% on 1/21/25.  On Lantus 25 PTA, dose decreased here due to NPO and below baseline PO intake.  Last 24 hr BG as follows:   Recent Labs   Lab 01/29/25  1324 01/29/25  1021 01/29/25  0547 01/29/25  0437 01/28/25  2245 01/28/25  1742   * 163* 221* 220* 145* 216*   - Continue Lantus at decreased dose of 15 units daily for now   - Continue sliding scale insulin  - BG checks ac/hs   - Hypoglycemia protocol      # Abdominal wound 2/2 above   - WOCN consult     # History of HIV  - Continue PTA Biktarvy       Diet: NPO per Anesthesia Guidelines for Procedure/Surgery Except for: Meds    DVT Prophylaxis: Pneumatic Compression Devices  Potts Catheter: Not present  Lines: None     Cardiac Monitoring: ACTIVE order. Indication: Chest pain/ ACS rule out (24 hours)  Code Status: Full Code      Clinically Significant Risk Factors         # Hyponatremia: Lowest Na = 130 mmol/L in last 2 days, will monitor as appropriate  # Hypochloremia: Lowest Cl = 96 mmol/L in last 2 days, will monitor as appropriate    # Hypomagnesemia: Lowest Mg = 1.6 mg/dL in last 2 days, will replace as needed   # Hypoalbuminemia: Lowest albumin = 3.1 g/dL at 1/24/2025 12:37 PM, will monitor as appropriate               # DMII: A1C = 10.0 % (Ref range: <5.7 %) within past 6 months   # Overweight: Estimated body mass index is 25.06 kg/m  as calculated from the following:    Height as of this encounter: 1.626 m (5' 4\").    Weight as of this encounter: 66.2 kg (146 lb).    "     # Financial/Environmental Concerns: unemployed  # Housing Instability: noted in nursing assessment         Social Drivers of Health    Food Insecurity: High Risk (1/27/2025)    Food Insecurity     Within the past 12 months, did you worry that your food would run out before you got money to buy more?: Yes     Within the past 12 months, did the food you bought just not last and you didn t have money to get more?: Yes   Housing Stability: High Risk (1/27/2025)    Housing Stability     Do you have housing? : No     Are you worried about losing your housing?: Yes   Tobacco Use: High Risk (1/21/2025)    Patient History     Smoking Tobacco Use: Some Days     Smokeless Tobacco Use: Former   Financial Resource Strain: High Risk (1/27/2025)    Financial Resource Strain     Within the past 12 months, have you or your family members you live with been unable to get utilities (heat, electricity) when it was really needed?: Yes   Alcohol Use: Unknown (8/27/2020)    AUDIT-C     Average Number of Drinks: Patient declined   Transportation Needs: High Risk (1/27/2025)    Transportation Needs     Within the past 12 months, has lack of transportation kept you from medical appointments, getting your medicines, non-medical meetings or appointments, work, or from getting things that you need?: Yes    Received from [facility], [facility]    Social Connections          Disposition Plan     Medically Ready for Discharge: Anticipated Today      The patient's care was discussed with the Attending Physician, Dr. Mariza Seay .    Roro Gomes, [facility]  Hospitalist Service, GOLD TEAM [address] [place] [address]  Securely message with [employer] (more info)  Text page via [facility] Paging/Directory   See signed in provider for up to date coverage information  ______________________________________________________________________    Interval History    Andrew is resting in bed.  No complaints of chest pain on assessment.  Feeling very tired from last night.  Denies feeling short of breath today.  Able to have a BM yesterday.  No nausea or vomiting.      Physical Exam   Vital Signs: Temp: 97.8  F (36.6  C) Temp src: Oral BP: 125/83 Pulse: 93   Resp: 18 SpO2: 98 % O2 Device: None (Room air)    Weight: 146 lbs 0 oz    GENERAL: Alert and oriented x 3. Well nourished, well developed.  Petite body habitus.  No acute distress.    HEENT: Normocephalic, atraumatic. Anicteric sclera. Mucous membranes moist.   CV: RRR. S1, S2. No murmurs appreciated.   RESPIRATORY: Effort normal on room air. Lungs CTAB with no wheezing, rales, or rhonchi.   GI: Abdomen soft, non distended, bowel sounds present x all 4 quadrants.  Abdominal wound covered and dressed with gauze and abdominal pad.     NEUROLOGICAL: No focal deficits. Follows commands.  Strength equal in upper and lower extremities.   MUSCULOSKELETAL: No joint swelling or tenderness. Moves all extremities.   EXTREMITIES: No gross deformities. No peripheral edema.   SKIN: Grossly warm, dry, and intact. No jaundice. No rashes.       Medical Decision Making       50 MINUTES SPENT BY ME on the date of service doing chart review, history, exam, documentation & further activities per the note.      Data     I have personally reviewed the following data over the past 24 hrs:    9.1  \   9.3 (L)   / 515 (H)     130 (L) 96 (L) 14.5 /  221 (H)   3.8 24 0.58 (L) \     Trop: 72 (H) BNP: N/A       Imaging results reviewed over the past 24 hrs:   Recent Results (from the past 24 hours)   XR Gastrografin Challenge    Narrative    Exam: XR GASTROGRAFIN CHALLENGE, 1/29/2025 3:47 AM    Indication: Small Bowel Obstruction    Comparison: CT abdomen/pelvis 1/24/2025, abdominal radiograph on  12/20/2025    Findings:   Portable supine AP view of the abdomen was obtained. Gastrografin  contrast has passed through into the large bowel 8 hours  after  administration. Contrast opacified loops of small bowel in the central  abdomen are mildly dilated, measuring up to 3.5 cm.      Impression    Impression: Contrast opacification of the colon, consistent with a  successful Gastrografin challenge. Persistent dilation of loops of  small bowel, suggestive of low-grade obstruction.    I have personally reviewed the examination and initial interpretation  and I agree with the findings.    BORIS PAYTON MD         SYSTEM ID:  [id number]   Echo Complete   Result Value    LVEF  45-50% (mildly reduced)    [facility]    [id number]  [id number]  [id number]  [id number]^ERIK^LILIANE^JOSE ALFREDO     [facility]  Echocardiography Laboratory  [address] [place] [id number]     Name: BREE GARCIA  MRN: [id number]  : 1965  Study Date: 2025 08:33 AM  Age: 59 yrs  Gender: Male  Patient Location: [facility]  Reason For Study: Chest Pain  Ordering Physician: LILIANE VALENCIA  Performed By: Charbel Chen     BSA: 1.7 m2  Height: 64 in  Weight: 146 lb  BP: 116/71 mmHg  ______________________________________________________________________________  Procedure  Echocardiogram with two-dimensional, color and spectral Doppler.  ______________________________________________________________________________  Interpretation Summary  Left ventricular function is decreased. The ejection fraction is 45-50%  (mildly reduced). Left ventricular apical hypokinesis is present.  Global right ventricular function is normal.  Mild aortic insufficiency is present.  Pulmonary artery systolic pressure is normal.  Estimated mean right atrial pressure is normal.  No pericardial effusion is present.  ______________________________________________________________________________  Left Ventricle  Left ventricular size is normal. Left ventricular function is decreased. The  ejection fraction is 45-50% (mildly reduced). Left ventricular  apical  hypokinesis is present.     Right Ventricle  The right ventricle is normal size. Global right ventricular function is  normal.     Atria  Both atria appear normal.     Mitral Valve  The mitral valve is normal.     Aortic Valve  Mild aortic valve calcification is present. Mild aortic insufficiency is  present.     Tricuspid Valve  The tricuspid valve is normal. Trace tricuspid insufficiency is present. The  right ventricular systolic pressure is approximated at 17.5 mmHg plus the  right atrial pressure. Pulmonary artery systolic pressure is normal.     Pulmonic Valve  Trace pulmonic insufficiency is present.     Vessels  The aorta root is normal. The inferior vena cava was normal in size with  preserved respiratory variability. Estimated mean right atrial pressure is  normal.     Pericardium  No pericardial effusion is present.     Compared to Previous Study  There is no prior study for direct comparison.  ______________________________________________________________________________  MMode/2D Measurements & Calculations  IVSd: 0.88 cm  LVIDd: 4.5 cm  LVPWd: 0.98 cm  LV mass(C)d: 139.0 grams  LV mass(C)dI: 81.2 grams/m2  Ao root diam: 3.5 cm  asc Aorta Diam: 3.6 cm  LVOT diam: 2.1 cm  LVOT area: 3.4 cm2  Ao root diam index Ht(cm/m): 2.2  Ao root diam index BSA (cm/m2): 2.0  Asc Ao diam index BSA (cm/m2): 2.1  Asc Ao diam index Ht(cm/m): 2.2  EF Biplane: 51.2 %  LA Volume (BP): 36.0 ml     LA Volume Index (BP): 21.1 ml/m2  RWT: 0.43     Doppler Measurements & Calculations  MV E max heriberto: 36.5 cm/sec  MV A max heriberto: 64.9 cm/sec  MV E/A: 0.56  Ao V2 max: 103.6 cm/sec  Ao max P.3 mmHg  Ao V2 mean: 72.3 cm/sec  Ao mean P.4 mmHg  Ao V2 VTI: 17.0 cm  SIDNEY(I,D): 3.4 cm2  SIDNEY(V,D): 3.1 cm2  AI P1/2t: 491.0 msec  LV V1 max PG: 3.6 mmHg  LV V1 max: 94.8 cm/sec  LV V1 VTI: 17.0 cm  SV(LVOT): 57.4 ml  SI(LVOT): 33.5 ml/m2  TR max heriberto: 209.0 cm/sec  TR max P.5 mmHg  AV Heriberto Ratio (DI): 0.92  SIDNEY Index (cm2/m2):  2.0  E/E' av.2     Lateral E/e': 5.1  Medial E/e': 7.3     ______________________________________________________________________________  Report approved by: Ashley Elizondo MD on 2025 09:23 AM

## 2025-01-29 NOTE — PROGRESS NOTES
Case Request placed for Coronary Angiogram. Case reviewed with Dr. Rodrigues and Cards 1 Fellow. Tenative plan for CCL procedure 1/30/25. Patient currently on 7B; 6C bed request placed. PPM and primary team updated.

## 2025-01-29 NOTE — CONSULTS
"        Initial Psychiatric Consult   Consult date: January 29, 2025         Reason for Consult, requesting source:    Depression?   Requesting source: Carrie Seay    Labs and imaging reviewed.     Total time spent in chart review, patient interview and coordination of care; 65 min          HPI:   Per EMR:   \"Andrew Wolfe is a 59 year old male with past medical history significant for chronic abdominal pain, type 2 DM, HIV, PTSD, SBO s/p ex lap with small bowel resection (12/20/24) with return to OR on 12/24/24 for anastomotic leak, chronic housing instability admitted for recurrent small bowel obstruction.    # Recurrent SBO   # SBO s/p ex lap w/ small bowel resection on 12/20 with return to OR on 12/24 for anastomotic leak  # Acute pain 2/2 above  Readmitted with nausea, vomiting and recurrent SBO on CT scan. NG deferred due to lack of vomiting.  Reports no BM in last 2 days.  Magnesium improved from 1.4 to 1.6. \"    He is being seen by psychiatry in regards to depression in the context of significant medical problems. He admits to being sad, but denies being depressed. He is sleeping ok, appetite is good, no loss of interests, does not feel hopeless or suicidal. Minimal anxiety. From reviewing the chart I see that he has been diagnosed with PTSD (Dr Valero), but he didn't want to talk about this.   When I asked about his past stimulant use, possible ADHD he asked me to leave.          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 He was seen 5/20 by Dr Valero for PTSD.   Per Epic, he has been on Wellbutrin and briefly on Lexapro. He denied being on antidepressants in the past. He thought Wellbutrin was for sinus problems.         Substance Use and History:   When seen by Dr Valero in 2020 there was mention of some methamphetamine use up until several months before being seen, but he denies " this.        Tobacco Use    Smoking status: Some Days     Current packs/day: 0.25     Average packs/day: 0.3 packs/day for 40.0 years (10.0 ttl pk-yrs)     Types: Cigarettes    Smokeless tobacco: Former   Substance Use Topics    Alcohol use: No     Alcohol/week: 0.0 standard drinks of alcohol     Comment: Last etoh in 2007           Past Medical History:   PAST MEDICAL HISTORY:   Past Medical History:   Diagnosis Date    Acute appendicitis with localized peritonitis 01/31/2018    AIDS (H)     Allergic rhinitis due to other allergen     DNS    Anal dysplasia     Chronic abdominal pain     CNS toxoplasmosis (H)     COVID-19 01/11/2022    Diabetes type 2, controlled (H)     GERD (gastroesophageal reflux disease)     Herpes zoster 09/23/2016    History of seizure     History of substance abuse (H)     HIV (human immunodeficiency virus infection) (H)     HLD (hyperlipidemia)     Lung nodules     Periungual wart     Pneumonia 01/06/2019    PTSD (post-traumatic stress disorder)     Sleep apnea     doesn't use CPAP     Patient Active Problem List   Diagnosis    Allergic rhinitis due to other allergen    Brain lesion    Toxoplasma encephalitis (H)    Pulmonary nodules    Human immunodeficiency virus I infection (H)    Prurigo nodularis    Epidermal cyst    Insomnia, unspecified insomnia    Bowel obstruction (H)    Gastroesophageal reflux disease    Aphthous ulcer    Type 2 diabetes mellitus (H)    Slow transit constipation    Erectile dysfunction, unspecified erectile dysfunction type    Rupture of ulnar collateral ligament of right thumb    Panic disorder    Generalized anxiety disorder    Major depressive disorder, single episode, unspecified    S/P appendectomy    Horseshoe tear of retina of left eye without detachment    Anal dysplasia    Homeless    Closed fracture of distal ends of right radius and ulna, initial encounter    Methamphetamine abuse (H)    Gastric outlet obstruction    Partial intestinal obstruction,  unspecified cause (H)    Cellulitis of left wrist    Left sided abdominal pain    Nausea and vomiting, unspecified vomiting type    SBO (small bowel obstruction) (H)    Abdominal pain, generalized    Hyperglycemia    Abdominal pain, unspecified abdominal location    LLQ abdominal pain    Gastroesophageal reflux disease without esophagitis    Partial small bowel obstruction (H)    Generalized abdominal pain    Small bowel obstruction (H)      PAST SURGICAL HISTORY:   Past Surgical History:   Procedure Laterality Date    ANOSCOPY N/A 09/09/2020    Procedure: Exam Under Anesthesia, ANOSCOPY, fulgeration of rectal fissures with Rectal Biopsies;  Surgeon: Thanh Lundberg MD;  Location: [facility]    COLONOSCOPY Left 01/22/2016    Procedure: COMBINED COLONOSCOPY, SINGLE OR MULTIPLE BIOPSY/POLYPECTOMY BY BIOPSY;  Surgeon: Clark Saini MD;  Location: [facility]    ESOPHAGOSCOPY, GASTROSCOPY, DUODENOSCOPY (EGD), COMBINED N/A 06/06/2022    Procedure: ESOPHAGOGASTRODUODENOSCOPY, WITH BIOPSY;  Surgeon: Kecia Benítez MD;  Location: [facility]    ESOPHAGOSCOPY, GASTROSCOPY, DUODENOSCOPY (EGD), COMBINED N/A 10/10/2024    Procedure: Esophagoscopy, gastroscopy, duodenoscopy (EGD), combined;  Surgeon: Nico Bhatti DO;  Location: [facility]    HC EXPLORE UNDESC TESTIS,INGUIN/SCROTAL      LAPAROSCOPIC APPENDECTOMY N/A 01/31/2018    Procedure: LAPAROSCOPIC APPENDECTOMY;  LAPAROSCOPIC APPENDECTOMY;  Surgeon: Dawn Holt MD;  [facility]    LAPAROSCOPY DIAGNOSTIC (GENERAL) N/A 07/26/2016    Procedure: LAPAROSCOPY DIAGNOSTIC (GENERAL);  Surgeon: Susannah Arriaga MD;  [facility]: [facility]    LAPAROSCOPY DIAGNOSTIC (GENERAL) N/A 04/16/2018    Procedure: LAPAROSCOPY DIAGNOSTIC (GENERAL);  Diagnostic laparoscopy and lysis of adhesions;  Surgeon: Prince Dowling MD;  Location: [facility]    OPTICAL TRACKING SYSTEM CRANIOTOMY, EXCISE TUMOR, COMBINED Left 04/10/2015    Procedure: COMBINED OPTICAL TRACKING SYSTEM CRANIOTOMY, EXCISE  TUMOR;  Surgeon: Mirlande Colmenares MD;  Location: [facility]    PICC INSERTION - TRIPLE LUMEN Right 12/26/2024    Right brachial medial vein 42cm total 4cm external.    REPAIR GAMEKEEPER'S THUMB Right 12/02/2016    Procedure: REPAIR LIGAMENT ULNAR COLLATERAL THUMB (GAMEKEEPER'S);  Surgeon: Evin Zamorano MD;  [facility]    RESECT SMALL BOWEL WITHOUT OSTOMY N/A 12/20/2024    Procedure: Laparotomy exploratory with small bowel resection.;  Surgeon: Christiano Obrien MD;  [facility]: [facility]    RESECT SMALL BOWEL WITHOUT OSTOMY N/A 12/24/2024    Procedure: EXPLORATORY LAPAROTOMY, SMALL BOWEL RESECTION AND REANASTOMOSIS;  Surgeon: Shabnam Kate MD;  Location: [facility]    ZZC NONSPECIFIC PROCEDURE      right forearm fracture           Family History:   FAMILY HISTORY:   Family History   Problem Relation Age of Onset    Diabetes Brother     Diabetes Father     Alzheimer Disease Father     Unknown/Adopted Mother     Diabetes Paternal Grandfather     Cancer No family hx of         no skin cancer    Skin Cancer No family hx of         no famiy hx of skin cancer    Glaucoma No family hx of     Macular Degeneration No family hx of            Social History:   Originally from [place], in  since about 1990, used to work as a , but has been unable to work for many years due to various health problems. Has been homeless for over 3 years.          Physical ROS:   The 10 point Review of Systems is negative other than noted in the HPI or here.           Medications:     Current Facility-Administered Medications   Medication Dose Route Frequency Provider Last Rate Last Admin    atorvastatin (LIPITOR) tablet 40 mg  40 mg Oral QPM Shruti Gordon MD   40 mg at 01/29/25 0438    bictegravir-emtricitabine-tenofovir (BIKTARVY) -25 MG per tablet 1 tablet  1 tablet Oral Daily Aguila Painting PA-C   1 tablet at 01/29/25 1027    insulin aspart (NovoLOG) injection (RAPID ACTING)  1-3 Units  "Subcutaneous TID AC Carrie Seay MD   1 Units at 01/28/25 1850    insulin aspart (NovoLOG) injection (RAPID ACTING)  1-3 Units Subcutaneous At Bedtime Carrie Seay MD        insulin glargine (LANTUS PEN) injection 15 Units  15 Units Subcutaneous QAM Javier Schnedier APRN CNP   15 Units at 01/29/25 1023    magnesium hydroxide (MILK OF MAGNESIA) suspension 30 mL  30 mL Oral Daily Carrie Seay MD   30 mL at 01/28/25 1121    metoprolol tartrate (LOPRESSOR) half-tab 12.5 mg  12.5 mg Oral BID Shruti Gordon MD   12.5 mg at 01/29/25 1023    pantoprazole (PROTONIX) EC tablet 40 mg  40 mg Oral BID  Shruti Gordon MD   40 mg at 01/29/25 1023    polyethylene glycol (MIRALAX) Packet 17 g  17 g Oral BID Carrie Seay MD   17 g at 01/28/25 2048    sennosides (SENOKOT) tablet 8.6 mg  8.6 mg Oral BID Carrie Seay MD   8.6 mg at 01/28/25 2047    simethicone (MYLICON) chewable tablet 160 mg  160 mg Oral TID Roro Gomes CNP   160 mg at 01/29/25 1023    sodium chloride (PF) 0.9% PF flush 3 mL  3 mL Intracatheter Q8H Javier Schneider APRN CNP   3 mL at 01/28/25 2307              Allergies:     Allergies   Allergen Reactions    Fentanyl Blisters     Per pt, after taking this medication had blisters develope    Tylenol [Acetaminophen] Itching     12/27/24 Patient has received acetaminophen multiple times since documentation of this reaction, in 2016 patient was given acetaminophen with codeine - more likely that codeine would cause itching    Dulaglutide Rash    Insulin Lispro Rash     Patient reported    Penicillin V Other (See Comments) and Rash     Diffuse maculopapular rash + feels \"high\", per pt.     Profile shows many uses of Zosyn           Labs:     Recent Results (from the past 48 hours)   Glucose by meter    Collection Time: 01/27/25  4:35 PM   Result Value Ref Range    GLUCOSE BY METER POCT 157 (H) 70 - 99 mg/dL   Glucose by meter    Collection " Time: 01/27/25 10:02 PM   Result Value Ref Range    GLUCOSE BY METER POCT 146 (H) 70 - 99 mg/dL   Glucose by meter    Collection Time: 01/28/25  1:54 AM   Result Value Ref Range    GLUCOSE BY METER POCT 159 (H) 70 - 99 mg/dL   Basic metabolic panel    Collection Time: 01/28/25  7:13 AM   Result Value Ref Range    Sodium 130 (L) 135 - 145 mmol/L    Potassium 3.9 3.4 - 5.3 mmol/L    Chloride 96 (L) 98 - 107 mmol/L    Carbon Dioxide (CO2) 25 22 - 29 mmol/L    Anion Gap 9 7 - 15 mmol/L    Urea Nitrogen 14.6 8.0 - 23.0 mg/dL    Creatinine 0.67 0.67 - 1.17 mg/dL    GFR Estimate >90 >60 mL/min/1.73m2    Calcium 9.1 8.8 - 10.4 mg/dL    Glucose 169 (H) 70 - 99 mg/dL   CBC with platelets    Collection Time: 01/28/25  7:13 AM   Result Value Ref Range    WBC Count 7.4 4.0 - 11.0 10e3/uL    RBC Count 3.20 (L) 4.40 - 5.90 10e6/uL    Hemoglobin 8.3 (L) 13.3 - 17.7 g/dL    Hematocrit 26.1 (L) 40.0 - 53.0 %    MCV 82 78 - 100 fL    MCH 25.9 (L) 26.5 - 33.0 pg    MCHC 31.8 31.5 - 36.5 g/dL    RDW 14.2 10.0 - 15.0 %    Platelet Count 484 (H) 150 - 450 10e3/uL   Magnesium    Collection Time: 01/28/25  7:13 AM   Result Value Ref Range    Magnesium 1.6 (L) 1.7 - 2.3 mg/dL   Glucose by meter    Collection Time: 01/28/25  8:24 AM   Result Value Ref Range    GLUCOSE BY METER POCT 146 (H) 70 - 99 mg/dL   Glucose by meter    Collection Time: 01/28/25 11:52 AM   Result Value Ref Range    GLUCOSE BY METER POCT 178 (H) 70 - 99 mg/dL   Glucose by meter    Collection Time: 01/28/25  5:42 PM   Result Value Ref Range    GLUCOSE BY METER POCT 216 (H) 70 - 99 mg/dL   Glucose by meter    Collection Time: 01/28/25 10:45 PM   Result Value Ref Range    GLUCOSE BY METER POCT 145 (H) 70 - 99 mg/dL   EKG 12-lead, complete    Collection Time: 01/28/25 11:14 PM   Result Value Ref Range    Systolic Blood Pressure  mmHg    Diastolic Blood Pressure  mmHg    Ventricular Rate 88 BPM    Atrial Rate 88 BPM    AL Interval 132 ms    QRS Duration 86 ms     ms     QTc 418 ms    P Axis 28 degrees    R AXIS -23 degrees    T Axis 63 degrees    Interpretation ECG       Sinus rhythm  Nonspecific ST abnormality  Abnormal ECG  When compared with ECG of 27-Dec-2024 16:13,  Non-specific change in ST segment in Inferior leads  ST now depressed in Lateral leads  T wave inversion now evident in Lateral leads     Troponin T, High Sensitivity    Collection Time: 01/29/25 12:14 AM   Result Value Ref Range    Troponin T, High Sensitivity 34 (H) <=22 ng/L   EKG 12-lead, tracing only    Collection Time: 01/29/25  1:54 AM   Result Value Ref Range    Systolic Blood Pressure  mmHg    Diastolic Blood Pressure  mmHg    Ventricular Rate 97 BPM    Atrial Rate 97 BPM    MS Interval 118 ms    QRS Duration 84 ms     ms    QTc 441 ms    P Axis 28 degrees    R AXIS -39 degrees    T Axis 47 degrees    Interpretation ECG       Sinus rhythm  Left axis deviation  Abnormal ECG  When compared with ECG of 28-Jan-2025 23:14, (unconfirmed)  Non-specific change in ST segment in Inferior leads     Troponin T, High Sensitivity    Collection Time: 01/29/25  3:04 AM   Result Value Ref Range    Troponin T, High Sensitivity 53 (H) <=22 ng/L   Glucose by meter    Collection Time: 01/29/25  4:37 AM   Result Value Ref Range    GLUCOSE BY METER POCT 220 (H) 70 - 99 mg/dL   Basic metabolic panel    Collection Time: 01/29/25  5:47 AM   Result Value Ref Range    Sodium 130 (L) 135 - 145 mmol/L    Potassium 3.8 3.4 - 5.3 mmol/L    Chloride 96 (L) 98 - 107 mmol/L    Carbon Dioxide (CO2) 24 22 - 29 mmol/L    Anion Gap 10 7 - 15 mmol/L    Urea Nitrogen 14.5 8.0 - 23.0 mg/dL    Creatinine 0.58 (L) 0.67 - 1.17 mg/dL    GFR Estimate >90 >60 mL/min/1.73m2    Calcium 9.5 8.8 - 10.4 mg/dL    Glucose 221 (H) 70 - 99 mg/dL   CBC with platelets    Collection Time: 01/29/25  5:47 AM   Result Value Ref Range    WBC Count 9.8 4.0 - 11.0 10e3/uL    RBC Count 3.57 (L) 4.40 - 5.90 10e6/uL    Hemoglobin 9.5 (L) 13.3 - 17.7 g/dL    Hematocrit  "28.2 (L) 40.0 - 53.0 %    MCV 79 78 - 100 fL    MCH 26.6 26.5 - 33.0 pg    MCHC 33.7 31.5 - 36.5 g/dL    RDW 14.6 10.0 - 15.0 %    Platelet Count 535 (H) 150 - 450 10e3/uL   Magnesium    Collection Time: 01/29/25  5:47 AM   Result Value Ref Range    Magnesium 1.7 1.7 - 2.3 mg/dL   Phosphorus    Collection Time: 01/29/25  5:47 AM   Result Value Ref Range    Phosphorus 3.4 2.5 - 4.5 mg/dL   Troponin T, High Sensitivity    Collection Time: 01/29/25  5:47 AM   Result Value Ref Range    Troponin T, High Sensitivity 72 (H) <=22 ng/L   CBC with platelets    Collection Time: 01/29/25  7:00 AM   Result Value Ref Range    WBC Count 9.1 4.0 - 11.0 10e3/uL    RBC Count 3.50 (L) 4.40 - 5.90 10e6/uL    Hemoglobin 9.3 (L) 13.3 - 17.7 g/dL    Hematocrit 27.9 (L) 40.0 - 53.0 %    MCV 80 78 - 100 fL    MCH 26.6 26.5 - 33.0 pg    MCHC 33.3 31.5 - 36.5 g/dL    RDW 14.6 10.0 - 15.0 %    Platelet Count 515 (H) 150 - 450 10e3/uL   Extra Green Top (Lithium Heparin) Tube    Collection Time: 01/29/25  7:26 AM   Result Value Ref Range    Hold Specimen JIC    Echo Complete    Collection Time: 01/29/25  9:07 AM   Result Value Ref Range    LVEF  45-50% (mildly reduced)    Glucose by meter    Collection Time: 01/29/25 10:21 AM   Result Value Ref Range    GLUCOSE BY METER POCT 163 (H) 70 - 99 mg/dL          Physical and Psychiatric Examination:     /83 (BP Location: Left arm, Patient Position: Semi-Post's, Cuff Size: Adult Regular)   Pulse 93   Temp 97.8  F (36.6  C) (Oral)   Resp 18   Ht 1.626 m (5' 4\")   Wt 66.2 kg (146 lb)   SpO2 98%   BMI 25.06 kg/m    Weight is 146 lbs 0 oz  Body mass index is 25.06 kg/m .    Physical Exam:  I have reviewed the physical exam as documented by by the medical team and agree with findings and assessment and have no additional findings to add at this time.         MSE:   Appearance: awake, alert and adequately groomed  Attitude:  somewhat cooperative  Eye Contact:  fair  Mood:  sad   Affect:  " "labile  Speech:  clear, coherent  Psychomotor Behavior:  no evidence of tardive dyskinesia, dystonia, or tics  Muscle strength and tone: intact   Thought Process:  sparse   Associations:  no loose associations  Thought Content:  no evidence of suicidal ideation or homicidal ideation and no evidence of psychotic thought  Insight:  fair  Judgement:  fair  Oriented to:  Unable to assess  Attention Span and Concentration:  fair  Recent and Remote Memory:  fair             DSM-5 Diagnosis:   311 (F32.8) Other/unspec. Depressive Disorder  PTSD by history           Assessment:   He is certainly irritable, but denies being depressed (although I think that is likely).   He doesn't appear to be interested in any medications for this. I asked him about being back on Wellbutrin, but he declined; \"I don't like taking meds\"  He probably would not be interested in seeing psychology.           Summary of Recommendations:   If he wants to try something for mood I would offer him Lexapro 10 mg per day    Contact me as needed.  Aguila Mullen M.D.   Consult Liaison Psychiatry   [facility]   Contact on Vocera  If I am not available, then [facility] line ([phone]) should know who   Is covering our consult service.           \"This dictation was performed with voice recognition software and may contain errors,  omissions and inadvertent word substitution.\"           "

## 2025-01-29 NOTE — PROGRESS NOTES
[facility] Medicine Cross Cover Note    January 29, 2025 1:28 AM    I was notified by RN that this patient is screaming in excruciating pain. Midsternal chest pain radiating to back/L arm.   This happened earlier in the evening prompted EKG (nonspecific STT abnormality in inferior leads, ST depression inferiorly) and troponin check 32 (midly elevated from baseline 10 1 month ago).     When I saw him, he was pain free: states pain comes and goes, when it comes on, it is excruciating lasting 10-15 minutes and resolves on its own. Initially he felt it could be 'gas'    Exam:  alert and oriented, not in acute distress, but then the pain came on, and he started mourning. /88 HR90s regular.    Esophageal spasm? Need to r/o ACS.  Already on simethicone scheduled    Action taken:   -stat repeat EKG to r/o dynamic change ---> less prominent ST depression in lead I  - nitroglycerin sublingual ---> this was effective per patient  - repeat troponin in 2 hours from the first one  - PPI bid for possible GERD/esophageal spasm    Communicated plan in person.     2:30a  Paged that his chest pain returned. VSS.  - ordered aspirin  - another nitroglycerin sublingual for chest pain  - repeat troponin due now ---> would consult cardiology and start heparin drip if troponin uptrended    4a  Repeat Troponin 54.  He is still having intermittent CP responding to nitroglycerin  - discussed with cardiology fellow: rec cardiology consult for am. Order echo, full dose asa 325mg once, start lipitor 40, metoprolol 12.5 bid. Repeat troponin: if uptrending, start heparing drip.  - repeat troponin ordered    I spent 45 minutes on the date of the encounter doing chart review, history and exam, documentation and further activities noted above.         Shruti Gordon MD on 1/29/2025 at 1:28 AM

## 2025-01-29 NOTE — PLAN OF CARE
[id number]  Goal Outcome Evaluation:      Plan of Care Reviewed With: patient    Overall Patient Progress: no changeOverall Patient Progress: no change     Vital signs:  Temp: 97.6  F (36.4  C) Temp src: Oral BP: (!) 148/88 Pulse: 93   Resp: 17 SpO2: 100 % O2 Device: None (Room air)       Cardiac: pt complain of chest pain radiating to back and L shoulder. Provider was nottified and came to assess pt at the bedside. Orders include nitroglycerin q5min (given 2 doses total, pt stated it seemed to work first time, but requested a second dose 1hr later after complaining of more chest pain).   Other orders include aspirin, 12 lead ECG, troponin level and telemetry.    Activity: up ad lexi, ambulating in halls. Pt walked to another unit and tried getting into another pt's room. Pt states it was accidental.  Neuro: A&O x4, repeated requests of pain meds at beginning of shift.  Respiratory: pt denies SOB. Not labored breathing noticed.  GI/: pt had 1 loose BM with some mucous. Voiding AUOP per report, not saving  Diet: regular diet, no intake during this shift. NPO starting 1/29 0615  Skin: midline abd wound, DIANN (wound dressing changed performed during previous shift)  Lines: PIV SL  Drains: none  Labs: 1st troponin level result of 34, second troponin level result of 53, provider notified  Pain/ nausea: denies nausea, complaining of non-relieved pain/ chest pain around 0000, provider notified. Pt states nitroglycerin seemed to help with pain, given x2.     New changes this shift:   -chest pain, 2 doses of nitroglycerin and raising troponin levels.   -x-ray done at 0300  -echo done 0350  Plan:  continue plan of care.

## 2025-01-29 NOTE — PROGRESS NOTES
Surgery Progress Note  01/29/2025       Subjective:  Complaining of CP in the evening, EKG obtained with ST changes in inferior leads and elevation in troponin (53) from baseline. CP responsive with nitroglycerin. ECHO pending. GG given with good response, 3 bowel movements. Not complaining of abdominal pain this AM. No nausea, vomiting or shortness of breath. Still endorsing CP.      Objective:  Temp:  [96.9  F (36.1  C)-97.8  F (36.6  C)] 97.8  F (36.6  C)  Pulse:  [93-96] 93  Resp:  [17-18] 18  BP: (125-148)/(83-96) 125/83  SpO2:  [98 %-100 %] 98 %    I/O last 3 completed shifts:  In: 240 [P.O.:240]  Out: -    BM: x3     Gen: Awake, alert, NAD  CV: RRR  Resp: NLB on RA  Abd: soft, nondistended, nontender, Open wound with sloughing fibrinous material in superior aspect of incision. Granulating tissue in lower aspect of incision. No erythema evident.   Ext: WWP, no edema     Labs:  Recent Labs   Lab 01/29/25  0700 01/29/25  0547 01/28/25  0713   WBC 9.1 9.8 7.4   HGB 9.3* 9.5* 8.3*   * 535* 484*       Recent Labs   Lab 01/29/25  0547 01/29/25  0437 01/28/25  2245 01/28/25  0824 01/28/25  0713 01/26/25  0806 01/26/25  0613   *  --   --   --  130*  --  131*   POTASSIUM 3.8  --   --   --  3.9  --  3.6   CHLORIDE 96*  --   --   --  96*  --  99   CO2 24  --   --   --  25  --  24   BUN 14.5  --   --   --  14.6  --  10.2   CR 0.58*  --   --   --  0.67  --  0.66*   * 220* 145*   < > 169*   < > 119*   LINDA 9.5  --   --   --  9.1  --  8.8   MAG 1.7  --   --   --  1.6*  --  1.4*   PHOS 3.4  --   --   --   --   --   --     < > = values in this interval not displayed.       Imaging:  XR GASTROGRAFIN CHALLENGE, 1/29/2025    Impression: Contrast opacification of the colon, consistent with a  successful Gastrografin challenge. Persistent dilation of loops of  small bowel, suggestive of low-grade obstruction.       Assessment/Plan:   59 year old male with a past medical history of  chronic abdominal pain, AIDS,  diabetes mellitus type 2 and PTSD.  The patient underwent exploratory laparotomy and small bowel resection on December 20, 2024 for small bowel obstruction, with return to the OR on December 24, 2024 for anastomotic leak and recent discharge on Jan 21, 2025. He re-presented on Jan 21, 2025 to [facility] with bloating and transferred to [place] for further evaluation. EGS consulted for SBO at suture line of anastomosis. Currently tolerating a diet and having anterograde bowel function after GG. Abdominal wound is healing as expected with no signs of active infection    Recommendations:  -Continue Regular Diet  -Simethicone is fine  -Bowel Reg: Miralax BID, Senna BID, MOM every day.  Could consider enema if desired.  -Ambulate  -Dressing changes BID per nursing  -Ok to shower and get the incision wet (do not submerge underwater as in tub bathing)  -Recommend discharging patient home with miralax, senna, and simethicone.   -Assuming wound care can be continued, the incision is not a barrier to discharge.  -General surgery to sign off. Please reach out with further questions or concerns     Patient seen, examined and discussed with chief resident, who will discuss with staff surgeon.     Av Cormier MD  Urology, PGY-1 on EGS Service

## 2025-01-29 NOTE — PROGRESS NOTES
Care Management Follow Up    Length of Stay (days): 5    Expected Discharge Date: 01/31/2025     Concerns to be Addressed: discharge planning     Patient plan of care discussed at interdisciplinary rounds: Yes    Anticipated Discharge Disposition: Shelter (Our [facility])              Anticipated Discharge Services: None  Anticipated Discharge DME: Wound Vac    Patient/family educated on Medicare website which has current facility and service quality ratings: no  Education Provided on the Discharge Plan: Yes  Patient/Family in Agreement with the Plan: yes    Referrals Placed by CM/SW:  Our [facility]  Private pay costs discussed: Not applicable    Discussed  Partnership in Safe Discharge Planning  document with patient/family: No     Handoff Completed: No, handoff not indicated or clinically appropriate    Additional Information:  SW following for discharge planning. Per chart review and IDT rounds, pt is having a coronary angiogram and it not yet medically ready for discharge. Pt has been [facility]. SW messaged Sandra De Jesus, RNCC at [facility], to update her that pt is not yet ready for discharge. Of note, pt needs to be more independent with his own wound care, per Sandra, as well, before he can discharge back to [facility].     Next Steps:   -Follow for safe discharge back to our [facility]  -Encourage pt to complete wound cares on his own.   -Standing wound care orders to [facility]  Acute Care - Float Coverage  Available via [employer]

## 2025-01-29 NOTE — PROGRESS NOTES
CARE MANAGEMENT FOLLOW UP    Additional Information:   01/29:  CHW delegated by DAYNA has rescheduled the PCP appt for this Pt with Rani Madrigal at [facility], on Thursday 02/13/2025 at 11:15 AM.    Gilmer Wright   Community Health Worker, 7A&7B [facility].  [place], [place]   P [phone]   Fax: [phone]  Email: [email or web address]

## 2025-01-29 NOTE — CONSULTS
Cardiology Consult Note     Date of Service: 01/29/2025  Patient: Andrew Collier  MRN: [id number]  Admission Date: 1/24/2025  Hospital Day: 5    Reason for Consult: substernal chest pain and elevated troponin    Cardiology Problem List:  NSTEMI, Type 1  HFmrEF (45-50%)    Assessment:   59 year old male with PMH including DM, HIV, recent SBO s/p ex lap w small bowel resection (12/20/24) c/b anastomotic leak, DIANA who is admitted for recurrent SBO and now found to have an NSTEMI.    NSTEMI, Type I  Pt with multiple recurrent episodes of substernal radiating chest pain in the setting of sharply rising troponins. No ST elevations appreciated on EKG. TTE shows a reduction in the EF and apical wall hypokinesis  (no prior TTE to confirm whether or not this new). Overall, this is presentation is concerning for an NSTEMI, and likely underlying obstructive CAD. Given the reduction in heart function and symptoms being present, will plan to proceed with coronary angiogram and possible PCI.  Patient is currently chest pain free.    Heart failure with mildly reduced ejection fraction, compensated  Diagnosed on TTE (01/29). Most likely secondary to ICM. Currently euvolemic on examination so evidence of decompensation. Will plan to optimize GDMT after primary management of ACS.    Recommendations:   Coronary angiogram on 1/30 (Patient has been consented)  Cont GDMT for ACS:   Antiplatelet: s/p  mg load. Cont ASA 81 mg daily  AC: Heparin  Statin: Atorvastatin 40 mg  BB: Metoprolol tartrate 12.5 mg BID.   Nitrates: Sublinguinal Nitroglycerin (NG)  O2: On RA  Trend troponin Q3H to peak  If new chest pain, HD instability, or electrical instability, notify Cardiology for potential urgent catherizations  Plan for HFmrEF optimization with GDMT following acute coronary intervention    Patient was seen and plan of care was discussed with attending physician Dr. Nugent. Final recommendations pending Attending Attestation.  "Please don't hesitate to contact our team with any additional questions or concerns.    Reese Sweet, MS4    I saw this patient together with medical student, Reese Sweet, and performed an independent exam at the same time which confirmed findings. I have edited this note as appropriate to reflect our joint assessment/plan. Patient was also seen and discussed with attending physician, Dr. Moran, who is in agreement with above.     Julia Garza MD  Internal Medicine Resident PGY-2  [facility]      Subjective   History of Present Illness:    Andrew Collier is a 59 year old male with a history of DM, HIV, SBO s/p ex lap c/b anastomotic leak who is admitted for recurrent SBO.    Started experiencing severe 10/10 substernal chest pain 1/28 evening with radiation to the L chest and down the L arm. Reports his left arm felt \"cold\". 4 episodes of chest pain in total, lasting 40 mins to an hour at a time. He did have some relief with sublingual nitroglycerin. Has never experienced similar pain in the past.    EKG at the time with nonspecific ST segment changes in the inferior leads. Discussed with Cardiology overnight and started heparin gtt, ASA load, Lipitor 40 mg at bedtime, Lopressor 12.5 mg BID. TTE 1/29 with reduced EF (45-50%) and LV apical hypokinesis.     No chest pain or shortness of breath today. He does currently smoke and reports last use 1 week ago. No previous cardiac history.     Review of Systems:  A comprehensive ROS was performed and found to be negative or non-contributory with the exception of that noted in the HPI above.    Past Medical History:   Diagnosis Date    Acute appendicitis with localized peritonitis 01/31/2018    AIDS (H)     Allergic rhinitis due to other allergen     DNS    Anal dysplasia     Chronic abdominal pain     CNS toxoplasmosis (H)     COVID-19 01/11/2022    Diabetes type 2, controlled (H)     GERD (gastroesophageal reflux disease)     Herpes zoster " 09/23/2016    History of seizure     History of substance abuse (H)     HIV (human immunodeficiency virus infection) (H)     HLD (hyperlipidemia)     Lung nodules     Periungual wart     Pneumonia 01/06/2019    PTSD (post-traumatic stress disorder)     Sleep apnea     doesn't use CPAP     Past Surgical History:   Procedure Laterality Date    ANOSCOPY N/A 09/09/2020    Procedure: Exam Under Anesthesia, ANOSCOPY, fulgeration of rectal fissures with Rectal Biopsies;  Surgeon: Thanh Lundberg MD;  [facility]: [facility]    COLONOSCOPY Left 01/22/2016    Procedure: COMBINED COLONOSCOPY, SINGLE OR MULTIPLE BIOPSY/POLYPECTOMY BY BIOPSY;  Surgeon: Clark Saini MD;  Location: [facility] GI    ESOPHAGOSCOPY, GASTROSCOPY, DUODENOSCOPY (EGD), COMBINED N/A 06/06/2022    Procedure: ESOPHAGOGASTRODUODENOSCOPY, WITH BIOPSY;  Surgeon: Kecia Benítez MD;  Location: [facility]    ESOPHAGOSCOPY, GASTROSCOPY, DUODENOSCOPY (EGD), COMBINED N/A 10/10/2024    Procedure: Esophagoscopy, gastroscopy, duodenoscopy (EGD), combined;  Surgeon: Ncio Bhatti DO;  Location: [facility]    HC EXPLORE UNDESC TESTIS,INGUIN/SCROTAL      LAPAROSCOPIC APPENDECTOMY N/A 01/31/2018    Procedure: LAPAROSCOPIC APPENDECTOMY;  LAPAROSCOPIC APPENDECTOMY;  Surgeon: Dawn Holt MD;  [facility]    LAPAROSCOPY DIAGNOSTIC (GENERAL) N/A 07/26/2016    Procedure: LAPAROSCOPY DIAGNOSTIC (GENERAL);  Surgeon: Susannah Arriaga MD;  [facility]    LAPAROSCOPY DIAGNOSTIC (GENERAL) N/A 04/16/2018    Procedure: LAPAROSCOPY DIAGNOSTIC (GENERAL);  Diagnostic laparoscopy and lysis of adhesions;  Surgeon: Prince Dowling MD;  [facility]    OPTICAL TRACKING SYSTEM CRANIOTOMY, EXCISE TUMOR, COMBINED Left 04/10/2015    Procedure: COMBINED OPTICAL TRACKING SYSTEM CRANIOTOMY, EXCISE TUMOR;  Surgeon: Mirlande Colmenares MD;  [facility]    PICC INSERTION - TRIPLE LUMEN Right 12/26/2024    Right brachial medial vein 42cm total 4cm external.    REPAIR  GAMEKEEPER'S THUMB Right 12/02/2016    Procedure: REPAIR LIGAMENT ULNAR COLLATERAL THUMB (GAMEKEEPER'S);  Surgeon: Evin Zamorano MD;  [facility]    RESECT SMALL BOWEL WITHOUT OSTOMY N/A 12/20/2024    Procedure: Laparotomy exploratory with small bowel resection.;  Surgeon: Christiano Obrien MD;  [facility]    RESECT SMALL BOWEL WITHOUT OSTOMY N/A 12/24/2024    Procedure: EXPLORATORY LAPAROTOMY, SMALL BOWEL RESECTION AND REANASTOMOSIS;  Surgeon: Shabnam Kate MD;  [facility]    ZZC NONSPECIFIC PROCEDURE      right forearm fracture     Social History     Socioeconomic History    Marital status: Single   Occupational History    Occupation:      Comment: 5 years; temp agency    Occupation: Unemployed   Tobacco Use    Smoking status: Some Days     Current packs/day: 0.25     Average packs/day: 0.3 packs/day for 40.0 years (10.0 ttl pk-yrs)     Types: Cigarettes    Smokeless tobacco: Former   Substance and Sexual Activity    Alcohol use: No     Alcohol/week: 0.0 standard drinks of alcohol     Comment: Last etoh in 2007    Drug use: Not Currently     Types: Marijuana     Comment: chart indicated meth use but pt denies; states he does not use drugs    Sexual activity: Not Currently     Partners: Female, Male     Comment: Last sexual activity 2008   Social History Narrative    Born in [facility].  [place] the [place] in [place].  Last traveled to visit family in 2008.        1/7/2019 - Homeless. Working with a  at [employer]  trying to get housing. Sexually active with two partners recently using condoms 100% of the time. Smokes occasionally, not for the last 4 weeks.        1/7/2020 at [facility] since 1/1/2020. 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  Care established with ID and endocrine     Social Drivers of Health     Financial Resource Strain: High Risk (1/27/2025)    Financial Resource Strain     Within the past 12 months, have you or your family members you live with  been unable to get utilities (heat, electricity) when it was really needed?: Yes   Food Insecurity: High Risk (1/27/2025)    Food Insecurity     Within the past 12 months, did you worry that your food would run out before you got money to buy more?: Yes     Within the past 12 months, did the food you bought just not last and you didn t have money to get more?: Yes   Transportation Needs: High Risk (1/27/2025)    Transportation Needs     Within the past 12 months, has lack of transportation kept you from medical appointments, getting your medicines, non-medical meetings or appointments, work, or from getting things that you need?: Yes    [facility]    Social Connections   Interpersonal Safety: Low Risk  (1/27/2025)    Interpersonal Safety     Do you feel physically and emotionally safe where you currently live?: Yes     Within the past 12 months, have you been hit, slapped, kicked or otherwise physically hurt by someone?: No     Within the past 12 months, have you been humiliated or emotionally abused in other ways by your partner or ex-partner?: No   Housing Stability: High Risk (1/27/2025)    Housing Stability     Do you have housing? : No     Are you worried about losing your housing?: Yes      Family History   Problem Relation Age of Onset    Diabetes Brother     Diabetes Father     Alzheimer Disease Father     Unknown/Adopted Mother     Diabetes Paternal Grandfather     Cancer No family hx of         no skin cancer    Skin Cancer No family hx of         no famiy hx of skin cancer    Glaucoma No family hx of     Macular Degeneration No family hx of      Medications Prior to Admission   Medication Sig Dispense Refill Last Dose/Taking    bictegravir-emtricitabine-tenofovir (BIKTARVY) -25 MG per tablet Take 1 tablet by mouth daily. 30 tablet 0 1/23/2025    insulin aspart (NOVOLOG PEN) 100 UNIT/ML pen Inject 10 Units  subcutaneously 3 times daily (with meals). Take only when you have access to a meal. 15 mL 3 Unknown    insulin glargine (LANTUS PEN) 100 UNIT/ML pen Inject 25 Units subcutaneously every morning. REDUCE TO 15 UNITS IF YOU THINK YOU WILL NOT HAVE FOOD THAT DAY. 15 mL 3 Unknown    blood glucose (NO BRAND SPECIFIED) lancets standard Use to test blood sugar 1 time daily or as directed. 100 Lancet 4     blood glucose (NO BRAND SPECIFIED) test strip Use to test blood sugar 1 time daily or as directed. 100 strip 4     blood glucose monitoring (NO BRAND SPECIFIED) meter device kit Use to test blood sugar 3 times daily or as directed. 1 kit 0     Continuous Glucose Sensor (DEXCOM G6 SENSOR) MISC 1 each every 10 days Change every 10 days. 3 each 11     Continuous Glucose Sensor (DEXCOM G7 SENSOR) MISC 1 Device every 10 days. 9 each 3     Continuous Glucose Transmitter (DEXCOM G6 TRANSMITTER) MISC 1 each every 3 months Change every 3 months. 1 each 3     insulin pen needle (32G X 4 MM) 32G X 4 MM miscellaneous Use 4-5 pen needles daily or as directed. 100 each 2     naproxen (NAPROSYN) 500 MG tablet Take 1 tablet (500 mg) by mouth 2 times daily (with meals). (Patient not taking: Reported on 2025) 20 tablet 0 Not Taking    Nutritional Supplements (ENSURE ACTIVE) LIQD Take 414 mLs by mouth daily (Patient not taking: Reported on 2025) 30 mL 11 Not Taking    [] oxyCODONE (ROXICODONE) 5 MG tablet Take 1 tablet (5 mg) by mouth or Feeding Tube every 8 hours as needed for moderate to severe pain. (Patient not taking: Reported on 2025) 12 tablet 0 Not Taking    pantoprazole (PROTONIX) 40 MG EC tablet Take 1 tablet (40 mg) by mouth 2 times daily (before meals). (Patient not taking: Reported on 2025) 60 tablet 0 Not Taking     Current Facility-Administered Medications   Medication Dose Route Frequency Provider Last Rate Last Admin    acetaminophen (TYLENOL) tablet 650 mg  650 mg Oral Q4H Javier Castro  VIKASH Milton CNP   650 mg at 01/28/25 2208    Or    acetaminophen (TYLENOL) Suppository 650 mg  650 mg Rectal Q4H PRN Javier Schneider APRN CNP        atorvastatin (LIPITOR) tablet 40 mg  40 mg Oral QPM Shruti Gordon MD   40 mg at 01/29/25 0438    bictegravir-emtricitabine-tenofovir (BIKTARVY) -25 MG per tablet 1 tablet  1 tablet Oral Daily [employer] PA-C   1 tablet at 01/28/25 0814    bisacodyl (DULCOLAX) EC tablet 5 mg  5 mg Oral Daily PRN Javier Schneider APRN CNP        Or    bisacodyl (DULCOLAX) EC tablet 10 mg  10 mg Oral Daily PRN Javier Schneider APRN CNP        bisacodyl (DULCOLAX) suppository 10 mg  10 mg Rectal Daily PRN Carrie Seay MD        calcium carbonate (TUMS) chewable tablet 1,000 mg  1,000 mg Oral 4x Daily PRN Javier Schneider APRN CNP   1,000 mg at 01/29/25 0101    glucose gel 15-30 g  15-30 g Oral Q15 Min Carrie Mast MD        Or    dextrose 50 % injection 25-50 mL  25-50 mL Intravenous Q15 Min Carrie Mast MD        Or    glucagon injection 1 mg  1 mg Subcutaneous Q15 Min Carrie Mast MD        heparin 25,000 units in 0.45% NaCl 250 mL ANTICOAGULANT infusion  0-5,000 Units/hr Intravenous Continuous Shruti Gordon MD   Paused at 01/29/25 0804    heparin loading dose for LOW INTENSITY TREATMENT * Give BEFORE starting heparin infusion  60 Units/kg Intravenous Once Shruti Gordon MD        HYDROmorphone (PF) (DILAUDID) injection 0.5 mg  0.5 mg Intravenous Q4H PRCarrie Bishop MD   0.5 mg at 01/29/25 0836    insulin aspart (NovoLOG) injection (RAPID ACTING)  1-3 Units Subcutaneous TID Carrie Norton MD   1 Units at 01/28/25 1850    insulin aspart (NovoLOG) injection (RAPID ACTING)  1-3 Units Subcutaneous At Bedtime Carrie Seay MD        insulin glargine (LANTUS PEN) injection 15 Units  15 Units Subcutaneous Javier Peña APRN CNP   15 Units at 01/28/25  0826    lidocaine (LMX4) cream   Topical Q1H PRN Javier Schneider APRN CNP        lidocaine 1 % 0.1-1 mL  0.1-1 mL Other Q1H PRN Javier Schneider APRN CNP        magnesium hydroxide (MILK OF MAGNESIA) suspension 30 mL  30 mL Oral Daily Carrie Seay MD   30 mL at 01/28/25 1121    melatonin tablet 5 mg  5 mg Oral At Bedtime PRN Javier Schneider APRN CNP        metoprolol tartrate (LOPRESSOR) half-tab 12.5 mg  12.5 mg Oral BID Shruti Gordon MD        naloxone (NARCAN) injection 0.2 mg  0.2 mg Intravenous Q2 Min PRN Cj Hoyos MD        Or    naloxone (NARCAN) injection 0.4 mg  0.4 mg Intravenous Q2 Min PRN Cj Hoyos MD        Or    naloxone (NARCAN) injection 0.2 mg  0.2 mg Intramuscular Q2 Min PRN Cj Hoyos MD        Or    naloxone (NARCAN) injection 0.4 mg  0.4 mg Intramuscular Q2 Min PRN Cj Hoyos MD        nitroGLYcerin (NITROSTAT) sublingual tablet 0.4 mg  0.4 mg Sublingual Q5 Min PRN Shruti Gordon MD   0.4 mg at 01/29/25 0248    ondansetron (ZOFRAN ODT) ODT tab 4 mg  4 mg Oral Q6H PRN Javier Schneider APRN CNP        Or    ondansetron (ZOFRAN) injection 4 mg  4 mg Intravenous Q6H PRN Javier Schneider APRN CNP        oxyCODONE (ROXICODONE) tablet 5 mg  5 mg Oral Q4H PRN Carrie Seay MD   5 mg at 01/28/25 2208    pantoprazole (PROTONIX) EC tablet 40 mg  40 mg Oral BID AC Shruti Gordon MD   40 mg at 01/29/25 0247    polyethylene glycol (MIRALAX) Packet 17 g  17 g Oral BID Carrie Seay MD   17 g at 01/28/25 2048    polyethylene glycol (MIRALAX) Packet 17 g  17 g Oral BID PRN Javier Schneider, VIKASH CNP        sennosides (SENOKOT) tablet 8.6 mg  8.6 mg Oral BID Carrie Seay MD   8.6 mg at 01/28/25 2047    simethicone (MYLICON) chewable tablet 160 mg  160 mg Oral TID Roro Gomes CNP   160 mg at 01/28/25 2047    sodium chloride (PF) 0.9% PF flush 3 mL   "3 mL Intracatheter Q8H Javier Schneider APRN CNP   3 mL at 01/28/25 2307    sodium chloride (PF) 0.9% PF flush 3 mL  3 mL Intracatheter q1 min prn Javier Schneider APRN CNP           Objective   Physical Exam:    Blood pressure 125/83, pulse 93, temperature 97.8  F (36.6  C), temperature source Oral, resp. rate 18, height 1.626 m (5' 4\"), weight 66.2 kg (146 lb), SpO2 98%.    Exam:  General: NAD  HEENT: no scleral icterus or injection  CARDIAC: RRR, no murmurs. No JVD  RESP:  CTAB  GI: nondistended  EXTREMITIES: no LE edema  NEURO: No focal deficits    Labs & Studies: I personally reviewed the following studies:  CMP  Recent Labs   Lab 01/29/25  0547 01/29/25  0437 01/28/25  2245 01/28/25  1742 01/28/25  0824 01/28/25  0713 01/26/25  0806 01/26/25  0613 01/25/25  1118 01/25/25  0806 01/25/25  0019 01/24/25  1237   *  --   --   --   --  130*  --  131*  --  134*  --  133*   POTASSIUM 3.8  --   --   --   --  3.9  --  3.6  --  3.8  --  4.2   CHLORIDE 96*  --   --   --   --  96*  --  99  --  103  --  98   CO2 24  --   --   --   --  25  --  24  --  23  --  23   ANIONGAP 10  --   --   --   --  9  --  8  --  8  --  12   * 220* 145* 216*   < > 169*   < > 119*   < > 116*   < > 143*   BUN 14.5  --   --   --   --  14.6  --  10.2  --  7.4*  --  12.9   CR 0.58*  --   --   --   --  0.67  --  0.66*  --  0.56*  --  0.61*   GFRESTIMATED >90  --   --   --   --  >90  --  >90  --  >90  --  >90   LINDA 9.5  --   --   --   --  9.1  --  8.8  --  8.4*  --  8.7*   MAG 1.7  --   --   --   --  1.6*  --  1.4*  --   --   --   --    PHOS 3.4  --   --   --   --   --   --   --   --   --   --   --    PROTTOTAL  --   --   --   --   --   --   --   --   --   --   --  7.5   ALBUMIN  --   --   --   --   --   --   --   --   --   --   --  3.1*   BILITOTAL  --   --   --   --   --   --   --   --   --   --   --  0.3   ALKPHOS  --   --   --   --   --   --   --   --   --   --   --  298*   AST  --   --   --   --   --   --   --   --   --   " --   --  31   ALT  --   --   --   --   --   --   --   --   --   --   --  26    < > = values in this interval not displayed.     CBC  Recent Labs   Lab 01/29/25  0700 01/29/25  0547 01/28/25  0713 01/26/25  0613   WBC 9.1 9.8 7.4 8.1   RBC 3.50* 3.57* 3.20* 3.05*   HGB 9.3* 9.5* 8.3* 8.0*   HCT 27.9* 28.2* 26.1* 25.2*   MCV 80 79 82 83   MCH 26.6 26.6 25.9* 26.2*   MCHC 33.3 33.7 31.8 31.7   RDW 14.6 14.6 14.2 14.5   * 535* 484* 511*     INRNo lab results found in last 7 days.         No data to display                  No results found for this or any previous visit (from the past 4320 hours).    Imaging:  Reviewed    I saw and evaluated patient with resident. I examined patient with resident. I discussed the results with patient and resident. I discussed our plan with patient and resident.  I agree with resident 's note and I edited the resident's note to make it a more comprehensive document.    I spent 50 min directly with patient and resident.    Lam Nugent MD, PhD  Professor of Medicine  Division of Cardiology

## 2025-01-29 NOTE — PROGRESS NOTES
[facility] Medicine Cross Cover Note    January 29, 2025 6:47 AM    Third troponin 72 and uptrending. I was notified by cardiology to initiate heparin drip    Action taken:   -ordered heparin drip per ACS protocol    Communicated plan via secure messaging.     I spent 10 minutes on the date of the encounter doing chart review, history and exam, documentation and further activities noted above.     Shruti Gordon MD on 1/29/2025 at 6:47 AM

## 2025-01-29 NOTE — PROGRESS NOTES
Brief Cardiology Note    Formal cardiology consult note to follow. I reviewed the chart and discussed this case with Dr Nugent. Patient is currently chest pain free however has a rising troponin with reduced LVEF on TTE with apical wall hypokinesis. Overall picture is concerning for NSTEMI type 1. Patient is consented for a coronary angiogram. Consent placed in the chart. Will maintain on heparin gtt, statin, ASA, and metoprolol.     --  Leon Zazueta MD  Cardiology Fellow

## 2025-01-29 NOTE — PROGRESS NOTES
Brief Cardiology Note    Paged by Medicine regarding elevated troponin. Briefly, Mr. Lockwood is a 60 y/o M with PMH of DM, HIV, recent SBO s/p ex lap w small bowel resection (12/20/24) c/b anastomotic leak, DIANA who was admitted for recurrent SBO. No prior h/o cardiac disease. SBO resolved and Po intake tolerating well. Pt complained of self resolving CP which lasted for couple of minutes. ECG at that time showed NSR with ST changes in inferior leads. Minimal st depressions in lateral leads. Repeated ECG showed NSR w non-spec ecg changes. Pt had another episode of CP which improved with SL nitroglycerin. No CP anymore after that. Repeated ECG unchanged. Troponin mildly elevated st 34 > 53.     Impression/Recommendation:    #Troponin elevation  - unclear etiology at this point but pt has risk factors for CAD. Obstructive CAD or unstable plaque cannot be excluded.   - agree to load with ASA and c/w baby aspirin  - start lipitor 40mg at bedtime  - start lopressor 12.5 mg bid now to reduce O2 consumption   - control BP for SBP <140 mmHg  - repeat troponin and if increasing start heparin drip per ACS protocol   - TTE in AM   - keep SpO2>94%  - keep Hb >8   - keep NPO for now for possible coronary angiogram    Thank you for allowing me to care for this patient, please don't hesitate to contact me with any questions regarding this plan.   Cardiology will continue to follow.      Zeke Stallings MD, PhD, MSCE  Cardiology Fellow

## 2025-01-29 NOTE — PLAN OF CARE
Goal Outcome Evaluation:      Plan of Care Reviewed With: patient    Overall Patient Progress: no change    Neuro: A/Ox4 able to make needs known  Respiratory: on room air denies SOB  Cardiac: WDL denies cardiac chest pain this shift. On telemetry SR   Diet: Regular tolerating with good appetite   GI/: +BS loose stool this shift.  Voiding without difficulties.   Incision/Drains: midline abdominal incision dressing CDI.   IV Access: left PIV SL   Pain: reports abdominal pain managed with IV dilaudid effective.  Labs: up trending troponin last value 264, MD made aware. Advice to continue to monitor for chest pain or increase in heart rate. Patient denies chest pain VSS.  AM labs ordered for replacement protocol  Activity: up ad lexi in room.  Plan: coronary angiogram tomorrow. Continues POC.

## 2025-01-30 VITALS
WEIGHT: 146.1 LBS | OXYGEN SATURATION: 99 % | BODY MASS INDEX: 24.94 KG/M2 | HEART RATE: 79 BPM | SYSTOLIC BLOOD PRESSURE: 128 MMHG | TEMPERATURE: 98.2 F | HEIGHT: 64 IN | DIASTOLIC BLOOD PRESSURE: 86 MMHG | RESPIRATION RATE: 16 BRPM

## 2025-01-30 LAB
ACT BLD: 166 SECONDS (ref 74–150)
ACT BLD: 170 SECONDS (ref 74–150)
ACT BLD: 251 SECONDS (ref 74–150)
ACT BLD: 259 SECONDS (ref 74–150)
ACT BLD: 320 SECONDS (ref 74–150)
ANION GAP SERPL CALCULATED.3IONS-SCNC: 9 MMOL/L (ref 7–15)
ATRIAL RATE - MUSE: 78 BPM
ATRIAL RATE - MUSE: 81 BPM
BUN SERPL-MCNC: 17.7 MG/DL (ref 8–23)
CALCIUM SERPL-MCNC: 9.4 MG/DL (ref 8.8–10.4)
CHLORIDE SERPL-SCNC: 100 MMOL/L (ref 98–107)
CREAT SERPL-MCNC: 0.73 MG/DL (ref 0.67–1.17)
DIASTOLIC BLOOD PRESSURE - MUSE: NORMAL MMHG
DIASTOLIC BLOOD PRESSURE - MUSE: NORMAL MMHG
EGFRCR SERPLBLD CKD-EPI 2021: >90 ML/MIN/1.73M2
ERYTHROCYTE [DISTWIDTH] IN BLOOD BY AUTOMATED COUNT: 14.7 % (ref 10–15)
GLUCOSE BLDC GLUCOMTR-MCNC: 151 MG/DL (ref 70–99)
GLUCOSE BLDC GLUCOMTR-MCNC: 197 MG/DL (ref 70–99)
GLUCOSE BLDC GLUCOMTR-MCNC: 91 MG/DL (ref 70–99)
GLUCOSE BLDC GLUCOMTR-MCNC: 92 MG/DL (ref 70–99)
GLUCOSE SERPL-MCNC: 134 MG/DL (ref 70–99)
HCO3 SERPL-SCNC: 25 MMOL/L (ref 22–29)
HCT VFR BLD AUTO: 28.9 % (ref 40–53)
HGB BLD-MCNC: 9.1 G/DL (ref 13.3–17.7)
INTERPRETATION ECG - MUSE: NORMAL
INTERPRETATION ECG - MUSE: NORMAL
MAGNESIUM SERPL-MCNC: 1.5 MG/DL (ref 1.7–2.3)
MAGNESIUM SERPL-MCNC: 2.3 MG/DL (ref 1.7–2.3)
MCH RBC QN AUTO: 26.1 PG (ref 26.5–33)
MCHC RBC AUTO-ENTMCNC: 31.5 G/DL (ref 31.5–36.5)
MCV RBC AUTO: 83 FL (ref 78–100)
P AXIS - MUSE: 27 DEGREES
P AXIS - MUSE: 29 DEGREES
PLATELET # BLD AUTO: 495 10E3/UL (ref 150–450)
POTASSIUM SERPL-SCNC: 3.8 MMOL/L (ref 3.4–5.3)
PR INTERVAL - MUSE: 136 MS
PR INTERVAL - MUSE: 140 MS
QRS DURATION - MUSE: 102 MS
QRS DURATION - MUSE: 94 MS
QT - MUSE: 412 MS
QT - MUSE: 446 MS
QTC - MUSE: 469 MS
QTC - MUSE: 518 MS
R AXIS - MUSE: -32 DEGREES
R AXIS - MUSE: -33 DEGREES
RBC # BLD AUTO: 3.49 10E6/UL (ref 4.4–5.9)
SODIUM SERPL-SCNC: 134 MMOL/L (ref 135–145)
SYSTOLIC BLOOD PRESSURE - MUSE: NORMAL MMHG
SYSTOLIC BLOOD PRESSURE - MUSE: NORMAL MMHG
T AXIS - MUSE: 31 DEGREES
T AXIS - MUSE: 44 DEGREES
UFH PPP CHRO-ACNC: 0.15 IU/ML
UFH PPP CHRO-ACNC: 0.25 IU/ML
UFH PPP CHRO-ACNC: <0.1 IU/ML
VENTRICULAR RATE- MUSE: 78 BPM
VENTRICULAR RATE- MUSE: 81 BPM
WBC # BLD AUTO: 7.9 10E3/UL (ref 4–11)

## 2025-01-30 PROCEDURE — 99152 MOD SED SAME PHYS/QHP 5/>YRS: CPT | Performed by: HOSPITALIST

## 2025-01-30 PROCEDURE — 85049 AUTOMATED PLATELET COUNT: CPT

## 2025-01-30 PROCEDURE — 85520 HEPARIN ASSAY: CPT | Performed by: STUDENT IN AN ORGANIZED HEALTH CARE EDUCATION/TRAINING PROGRAM

## 2025-01-30 PROCEDURE — 85347 COAGULATION TIME ACTIVATED: CPT

## 2025-01-30 PROCEDURE — 250N000013 HC RX MED GY IP 250 OP 250 PS 637: Performed by: STUDENT IN AN ORGANIZED HEALTH CARE EDUCATION/TRAINING PROGRAM

## 2025-01-30 PROCEDURE — 120N000005 HC R&B MS OVERFLOW UMMC

## 2025-01-30 PROCEDURE — 92978 ENDOLUMINL IVUS OCT C 1ST: CPT | Mod: 26 | Performed by: HOSPITALIST

## 2025-01-30 PROCEDURE — C1760 CLOSURE DEV, VASC: HCPCS | Performed by: HOSPITALIST

## 2025-01-30 PROCEDURE — 250N000011 HC RX IP 250 OP 636: Performed by: STUDENT IN AN ORGANIZED HEALTH CARE EDUCATION/TRAINING PROGRAM

## 2025-01-30 PROCEDURE — 93454 CORONARY ARTERY ANGIO S&I: CPT | Performed by: HOSPITALIST

## 2025-01-30 PROCEDURE — 250N000009 HC RX 250: Performed by: HOSPITALIST

## 2025-01-30 PROCEDURE — C1894 INTRO/SHEATH, NON-LASER: HCPCS | Performed by: HOSPITALIST

## 2025-01-30 PROCEDURE — 85014 HEMATOCRIT: CPT

## 2025-01-30 PROCEDURE — 99152 MOD SED SAME PHYS/QHP 5/>YRS: CPT | Mod: GC | Performed by: HOSPITALIST

## 2025-01-30 PROCEDURE — 93005 ELECTROCARDIOGRAM TRACING: CPT

## 2025-01-30 PROCEDURE — C9600 PERC DRUG-EL COR STENT SING: HCPCS | Performed by: HOSPITALIST

## 2025-01-30 PROCEDURE — 250N000013 HC RX MED GY IP 250 OP 250 PS 637: Performed by: PHYSICIAN ASSISTANT

## 2025-01-30 PROCEDURE — 99153 MOD SED SAME PHYS/QHP EA: CPT | Performed by: HOSPITALIST

## 2025-01-30 PROCEDURE — 99207 PR APP CREDIT; MD BILLING SHARED VISIT: CPT | Mod: FS | Performed by: NURSE PRACTITIONER

## 2025-01-30 PROCEDURE — 92928 PRQ TCAT PLMT NTRAC ST 1 LES: CPT | Mod: LD | Performed by: HOSPITALIST

## 2025-01-30 PROCEDURE — 250N000011 HC RX IP 250 OP 636: Performed by: INTERNAL MEDICINE

## 2025-01-30 PROCEDURE — 272N000001 HC OR GENERAL SUPPLY STERILE: Performed by: HOSPITALIST

## 2025-01-30 PROCEDURE — 36415 COLL VENOUS BLD VENIPUNCTURE: CPT | Performed by: STUDENT IN AN ORGANIZED HEALTH CARE EDUCATION/TRAINING PROGRAM

## 2025-01-30 PROCEDURE — 250N000013 HC RX MED GY IP 250 OP 250 PS 637: Performed by: INTERNAL MEDICINE

## 2025-01-30 PROCEDURE — C1769 GUIDE WIRE: HCPCS | Performed by: HOSPITALIST

## 2025-01-30 PROCEDURE — 36415 COLL VENOUS BLD VENIPUNCTURE: CPT

## 2025-01-30 PROCEDURE — 250N000013 HC RX MED GY IP 250 OP 250 PS 637: Performed by: NURSE PRACTITIONER

## 2025-01-30 PROCEDURE — 93010 ELECTROCARDIOGRAM REPORT: CPT | Mod: XU | Performed by: INTERNAL MEDICINE

## 2025-01-30 PROCEDURE — 99232 SBSQ HOSP IP/OBS MODERATE 35: CPT | Mod: FS | Performed by: STUDENT IN AN ORGANIZED HEALTH CARE EDUCATION/TRAINING PROGRAM

## 2025-01-30 PROCEDURE — C1887 CATHETER, GUIDING: HCPCS | Performed by: HOSPITALIST

## 2025-01-30 PROCEDURE — B2111ZZ FLUOROSCOPY OF MULTIPLE CORONARY ARTERIES USING LOW OSMOLAR CONTRAST: ICD-10-PCS | Performed by: HOSPITALIST

## 2025-01-30 PROCEDURE — 250N000011 HC RX IP 250 OP 636: Performed by: HOSPITALIST

## 2025-01-30 PROCEDURE — B240ZZ3 ULTRASONOGRAPHY OF SINGLE CORONARY ARTERY, INTRAVASCULAR: ICD-10-PCS | Performed by: HOSPITALIST

## 2025-01-30 PROCEDURE — 027035Z DILATION OF CORONARY ARTERY, ONE ARTERY WITH TWO DRUG-ELUTING INTRALUMINAL DEVICES, PERCUTANEOUS APPROACH: ICD-10-PCS | Performed by: HOSPITALIST

## 2025-01-30 PROCEDURE — C1874 STENT, COATED/COV W/DEL SYS: HCPCS | Performed by: HOSPITALIST

## 2025-01-30 PROCEDURE — C1753 CATH, INTRAVAS ULTRASOUND: HCPCS | Performed by: HOSPITALIST

## 2025-01-30 PROCEDURE — 83735 ASSAY OF MAGNESIUM: CPT | Performed by: STUDENT IN AN ORGANIZED HEALTH CARE EDUCATION/TRAINING PROGRAM

## 2025-01-30 PROCEDURE — 250N000013 HC RX MED GY IP 250 OP 250 PS 637: Performed by: HOSPITALIST

## 2025-01-30 PROCEDURE — 80048 BASIC METABOLIC PNL TOTAL CA: CPT

## 2025-01-30 PROCEDURE — 93454 CORONARY ARTERY ANGIO S&I: CPT | Mod: 26 | Performed by: HOSPITALIST

## 2025-01-30 PROCEDURE — 92978 ENDOLUMINL IVUS OCT C 1ST: CPT | Mod: LD | Performed by: HOSPITALIST

## 2025-01-30 PROCEDURE — C1725 CATH, TRANSLUMIN NON-LASER: HCPCS | Performed by: HOSPITALIST

## 2025-01-30 DEVICE — STENT CORONARY DES SYNERGY XD MR US 2.50X20MM H7493941820250: Type: IMPLANTABLE DEVICE | Status: FUNCTIONAL

## 2025-01-30 DEVICE — STENT CORONARY DES SYNERGY XD MR US 3.50X20MM H7493941820350: Type: IMPLANTABLE DEVICE | Status: FUNCTIONAL

## 2025-01-30 RX ORDER — IOPAMIDOL 755 MG/ML
INJECTION, SOLUTION INTRAVASCULAR
Status: DISCONTINUED | OUTPATIENT
Start: 2025-01-30 | End: 2025-01-30 | Stop reason: HOSPADM

## 2025-01-30 RX ORDER — NALOXONE HYDROCHLORIDE 0.4 MG/ML
0.2 INJECTION, SOLUTION INTRAMUSCULAR; INTRAVENOUS; SUBCUTANEOUS
Status: CANCELLED | OUTPATIENT
Start: 2025-01-30 | End: 2025-01-31

## 2025-01-30 RX ORDER — NICARDIPINE HYDROCHLORIDE 2.5 MG/ML
INJECTION INTRAVENOUS
Status: DISCONTINUED | OUTPATIENT
Start: 2025-01-30 | End: 2025-01-30 | Stop reason: HOSPADM

## 2025-01-30 RX ORDER — NALOXONE HYDROCHLORIDE 0.4 MG/ML
0.4 INJECTION, SOLUTION INTRAMUSCULAR; INTRAVENOUS; SUBCUTANEOUS
Status: CANCELLED | OUTPATIENT
Start: 2025-01-30 | End: 2025-01-31

## 2025-01-30 RX ORDER — PRASUGREL 10 MG/1
10 TABLET, FILM COATED ORAL DAILY
Status: DISCONTINUED | OUTPATIENT
Start: 2025-01-31 | End: 2025-02-03 | Stop reason: HOSPADM

## 2025-01-30 RX ORDER — NITROGLYCERIN 5 MG/ML
VIAL (ML) INTRAVENOUS
Status: DISCONTINUED | OUTPATIENT
Start: 2025-01-30 | End: 2025-01-30 | Stop reason: HOSPADM

## 2025-01-30 RX ORDER — SIMETHICONE 80 MG
80 TABLET,CHEWABLE ORAL ONCE
Status: COMPLETED | OUTPATIENT
Start: 2025-01-30 | End: 2025-01-30

## 2025-01-30 RX ORDER — HEPARIN SODIUM 1000 [USP'U]/ML
INJECTION, SOLUTION INTRAVENOUS; SUBCUTANEOUS
Status: DISCONTINUED | OUTPATIENT
Start: 2025-01-30 | End: 2025-01-30 | Stop reason: HOSPADM

## 2025-01-30 RX ORDER — MAGNESIUM SULFATE HEPTAHYDRATE 40 MG/ML
4 INJECTION, SOLUTION INTRAVENOUS ONCE
Status: COMPLETED | OUTPATIENT
Start: 2025-01-30 | End: 2025-01-30

## 2025-01-30 RX ORDER — FLUMAZENIL 0.1 MG/ML
0.2 INJECTION, SOLUTION INTRAVENOUS
Status: CANCELLED | OUTPATIENT
Start: 2025-01-30 | End: 2025-01-31

## 2025-01-30 RX ORDER — PRASUGREL 5 MG/1
TABLET, FILM COATED ORAL
Status: DISCONTINUED | OUTPATIENT
Start: 2025-01-30 | End: 2025-01-30 | Stop reason: HOSPADM

## 2025-01-30 RX ORDER — HYDROMORPHONE HYDROCHLORIDE 1 MG/ML
INJECTION, SOLUTION INTRAMUSCULAR; INTRAVENOUS; SUBCUTANEOUS
Status: DISCONTINUED | OUTPATIENT
Start: 2025-01-30 | End: 2025-01-30 | Stop reason: HOSPADM

## 2025-01-30 RX ORDER — ATROPINE SULFATE 0.1 MG/ML
0.5 INJECTION INTRAVENOUS
Status: CANCELLED | OUTPATIENT
Start: 2025-01-30 | End: 2025-01-31

## 2025-01-30 RX ORDER — SODIUM CHLORIDE 9 MG/ML
INJECTION, SOLUTION INTRAVENOUS CONTINUOUS
Status: CANCELLED | OUTPATIENT
Start: 2025-01-30 | End: 2025-01-30

## 2025-01-30 RX ORDER — FENTANYL CITRATE 50 UG/ML
25 INJECTION, SOLUTION INTRAMUSCULAR; INTRAVENOUS
Status: CANCELLED | OUTPATIENT
Start: 2025-01-30

## 2025-01-30 RX ADMIN — ASPIRIN 81 MG CHEWABLE TABLET 81 MG: 81 TABLET CHEWABLE at 08:31

## 2025-01-30 RX ADMIN — SENNOSIDES 8.6 MG: 8.6 TABLET ORAL at 22:26

## 2025-01-30 RX ADMIN — HYDROMORPHONE HYDROCHLORIDE 0.5 MG: 1 INJECTION, SOLUTION INTRAMUSCULAR; INTRAVENOUS; SUBCUTANEOUS at 09:42

## 2025-01-30 RX ADMIN — PANTOPRAZOLE SODIUM 40 MG: 40 TABLET, DELAYED RELEASE ORAL at 15:34

## 2025-01-30 RX ADMIN — MAGNESIUM HYDROXIDE 30 ML: 400 SUSPENSION ORAL at 12:52

## 2025-01-30 RX ADMIN — MAGNESIUM SULFATE IN WATER 4 G: 40 INJECTION, SOLUTION INTRAVENOUS at 09:39

## 2025-01-30 RX ADMIN — HEPARIN SODIUM 1100 UNITS/HR: 10000 INJECTION, SOLUTION INTRAVENOUS at 04:15

## 2025-01-30 RX ADMIN — Medication 12.5 MG: at 08:31

## 2025-01-30 RX ADMIN — SIMETHICONE CHEW TAB 80 MG 160 MG: 80 TABLET ORAL at 21:44

## 2025-01-30 RX ADMIN — BICTEGRAVIR SODIUM, EMTRICITABINE, AND TENOFOVIR ALAFENAMIDE FUMARATE 1 TABLET: 50; 200; 25 TABLET ORAL at 08:31

## 2025-01-30 RX ADMIN — ATORVASTATIN CALCIUM 40 MG: 40 TABLET, FILM COATED ORAL at 21:38

## 2025-01-30 RX ADMIN — HYDROMORPHONE HYDROCHLORIDE 0.5 MG: 1 INJECTION, SOLUTION INTRAMUSCULAR; INTRAVENOUS; SUBCUTANEOUS at 14:07

## 2025-01-30 RX ADMIN — POLYETHYLENE GLYCOL 3350 17 G: 17 POWDER, FOR SOLUTION ORAL at 01:50

## 2025-01-30 RX ADMIN — OXYCODONE HYDROCHLORIDE 5 MG: 5 TABLET ORAL at 13:24

## 2025-01-30 RX ADMIN — HYDROMORPHONE HYDROCHLORIDE 0.5 MG: 1 INJECTION, SOLUTION INTRAMUSCULAR; INTRAVENOUS; SUBCUTANEOUS at 20:45

## 2025-01-30 RX ADMIN — INSULIN GLARGINE 15 UNITS: 100 INJECTION, SOLUTION SUBCUTANEOUS at 08:35

## 2025-01-30 RX ADMIN — SIMETHICONE CHEW TAB 80 MG 160 MG: 80 TABLET ORAL at 08:31

## 2025-01-30 RX ADMIN — OXYCODONE HYDROCHLORIDE 5 MG: 5 TABLET ORAL at 22:08

## 2025-01-30 RX ADMIN — Medication 12.5 MG: at 21:38

## 2025-01-30 RX ADMIN — HYDROMORPHONE HYDROCHLORIDE 0.5 MG: 1 INJECTION, SOLUTION INTRAMUSCULAR; INTRAVENOUS; SUBCUTANEOUS at 01:44

## 2025-01-30 RX ADMIN — SIMETHICONE 80 MG: 80 TABLET, CHEWABLE ORAL at 05:23

## 2025-01-30 RX ADMIN — SIMETHICONE CHEW TAB 80 MG 160 MG: 80 TABLET ORAL at 13:24

## 2025-01-30 RX ADMIN — HYDROMORPHONE HYDROCHLORIDE 0.5 MG: 1 INJECTION, SOLUTION INTRAMUSCULAR; INTRAVENOUS; SUBCUTANEOUS at 05:55

## 2025-01-30 RX ADMIN — CALCIUM CARBONATE (ANTACID) CHEW TAB 500 MG 1000 MG: 500 CHEW TAB at 04:14

## 2025-01-30 RX ADMIN — PANTOPRAZOLE SODIUM 40 MG: 40 TABLET, DELAYED RELEASE ORAL at 08:31

## 2025-01-30 ASSESSMENT — ACTIVITIES OF DAILY LIVING (ADL)
ADLS_ACUITY_SCORE: 34
ADLS_ACUITY_SCORE: 38
ADLS_ACUITY_SCORE: 34
ADLS_ACUITY_SCORE: 38
ADLS_ACUITY_SCORE: 38
ADLS_ACUITY_SCORE: 34

## 2025-01-30 NOTE — PROGRESS NOTES
Admission to  at approximately 2200  Diagnosis: Admitted for recurrent small bowel obstruction, transferred to  following chest pain, elevated trops, EKG indicated NSTEMI, plan for coronary angio 1/30  Admitted from:    Via: Bed  Accompanied by: TERRA staff   Belongings: Placed at bedside  Admission questions: Already complete upon admission to   Teaching: Call don't fall, use of console, meal times, visiting hours, orientation to unit, when to call for the RN (angina/sob/dizzyness, etc.), and the importance of safety.   Access: L PIV, infusing heparin  Telemetry: In place  Ht./Wt.: Complete   Two nurse head-to-toe skin assessment performed by  and Betsy REYES Skin issues noted: abdominal incision, scattered scarring/bruising, peeling to bilateral feet. See PCS for further assessment and treatments.

## 2025-01-30 NOTE — PLAN OF CARE
Neuro: A&Ox4, able to make needs known  Cardiac: Tele in place, SR, HR 80s; troponin trended up, pt denied chest pain, team aware  Respiratory: O2 sats > 92 on room air  Diet: NPO at midnight for coronary angio (regular prior to that)  GI/: Last BM 1/29, multiple loose stools per report, reports gas pain and states unable to pass gas (team aware). Taking scheduled simethicone, provider ordered one-time dose overnight as well. Voiding spontaneously.  Endocrine: ACHS BG checks  Skin: Midline abdominal incision, dressing changes BID, last changed 1/29 at 2300 per plan of care  Pain: Abdominal pain 7-9 out of 10, PRN IV Dilaudid and PRN oral oxycodone available, per pt IV Dilaudid most effective.   Activity: Up ad lexi, independent in room  Access: L PIV, infusing heparing at 1100 units/hr    Plan: Coronary angio 1/30, notify Gold 6 with changes      Problem: Adult Inpatient Plan of Care  Goal: Plan of Care Review  Description: The Plan of Care Review/Shift note should be completed every shift.  The Outcome Evaluation is a brief statement about your assessment that the patient is improving, declining, or no change.  This information will be displayed automatically on your shift  note.  Outcome: Progressing  Flowsheets (Taken 1/30/2025 0042)  Outcome Evaluation: Dressing changed, pain managed with PRN oxy, troponin trending up (team aware), denies chest pain  Plan of Care Reviewed With: patient  Overall Patient Progress: no change   Goal Outcome Evaluation:      Plan of Care Reviewed With: patient    Overall Patient Progress: no changeOverall Patient Progress: no change    Outcome Evaluation: Dressing changed, pain managed with PRN oxy, troponin trending up (team aware), denies chest pain

## 2025-01-30 NOTE — PROGRESS NOTES
Care Management Follow Up    Length of Stay (days): 6    Expected Discharge Date: 01/31/2025     Concerns to be Addressed: discharge planning     Patient plan of care discussed at interdisciplinary rounds: Yes    Anticipated Discharge Disposition: Shelter ([facility])       [facility]  [address] [place] [address]  Temporary address: [address] [facility] [address]  Phone: [phone]    (Nimo Moe): [phone]  Emergency Shelter Manager (Ju): [phone]  Fax: [phone] (for sending discharge orders)  - 1/30: SW reached out to Sandra (RN at the shelter and apart of admissions team) via Teams and per Sandra, pt is good to come back to shelter as long as he is independent in wound cares and gets supplies.     RNCC (Marina) discussing wound care needs and home care with Sandra.        Anticipated Discharge Services: None  Anticipated Discharge DME: Wound Vac    Patient/family educated on Medicare website which has current facility and service quality ratings: no  Education Provided on the Discharge Plan: Yes  Patient/Family in Agreement with the Plan: yes.    This morning, DAYNA met with the pt at bedside. SW introduced self and role on his care team. Per DAYNA notes this admission, pt has voiced agreement with discharging back to [facility]. SW asked pt again to confirm and he reluctantly voiced agreement but also asked the SW to contact the SW at [facility] to see if he can get into [facility] (long term housing per their website) from the hospital. Pt told the SW that he has been doing his own wound cares.    SW called the [facility] (Phone: [phone]) at 11:35am and per the staff member that answered, the SW that's normally there is on a medical leave for another 4 weeks. There are SW's there on Tuesdays/Wednesdays 9-1pm and Thursdays 11-3pm but it's drop in only for in  "depth questions/resources.    Early afternoon, DAYNA met with the pt at bedside and updated him on the above. Pt expressed udnerstanding but then asked the SW to get him into an apartment for \"people my age\". SW informed him that isn't something DAYNA does from the hospital. DAYNA spoke with the pt about the appeal of [facility] and that RNCC is working on home care set up to have intermittent assist with wound cares. SW infomred pt that he still would need to be independent with wound cares while at [facility]. Pt told the SW that \"no one has been helping him\" (pt speaking about himself). This is different than what he had told SW this morning. Pt amenable to DAYNA giving him resources within [place] for senior housing.     DAYNA updated RNCC (Marina) who plans to see pt this afternoon      See RNCC note 1/30 for details. Plan is to discharge back to [facility] when ready.         Referrals Placed by CM/DAYNA:  n/a  Private pay costs discussed: Not applicable    Discussed  Partnership in Safe Discharge Planning  document with patient/family: No     Handoff Completed: No, handoff not indicated or clinically appropriate    Additional Information:  Per Gold 6 provider (Eliseo) to SW and RNCC via Amorelie, pt to have a heart cath today. Provider to update SW and RNCC later once she knows more/pt's DAVE.     Next Steps: Once DAYNA sees signed discharge orders, SW to send orders via POKKT to [facility]. Pt has ride benefits through his insurance so discharge ride will be done through that service.    ___________________    RADHA Win, LGSW  6C , covering beds 6401 to 6592  [facility]   Phone: [phone]  6C Cards DAYNA Donaldson      "

## 2025-01-30 NOTE — PRE-PROCEDURE
GENERAL PRE-PROCEDURE:   Procedure:  Coronary Angiogram with possible PCI  Date/Time:  1/30/2025 4:40 PM    Written consent obtained?: Yes    Risks and benefits: Risks, benefits and alternatives were discussed    Consent given by:  Patient  Patient states understanding of procedure being performed: Yes    Patient's understanding of procedure matches consent: Yes    Procedure consent matches procedure scheduled: Yes    Expected level of sedation:  Moderate  Appropriately NPO:  Yes  ASA Class:  3  Mallampati  :  Grade 2- soft palate, base of uvula, tonsillar pillars, and portion of posterior pharyngeal wall visible  Lungs:  Lungs clear with good breath sounds bilaterally  Heart:  RRR  History & Physical reviewed:  History and physical reviewed and no updates needed  Statement of review:  I have reviewed the lab findings, diagnostic data, medications, and the plan for sedation

## 2025-01-30 NOTE — PROGRESS NOTES
Care Management Follow Up    Additional information    1/30 - CHW delegated by DAYNA, has rescheduled the upcoming PCP appt to 2/5 at 1:15 PM. Attempted to schedule at a location closer to pt's discharge address, but they did not have sooner openings as pt would require; location remains the same at [place]. DAYNA notified, no further action taken.       Adriane Simmons  On license of [facility] Health Worker  6A, 6B, 6C  Ph [phone]  Fax [phone]

## 2025-01-30 NOTE — PLAN OF CARE
"I: Monitored vitals and assessed pt status.   Changes during this shift: Coronary angiogram completed, informed pharmacist about heparin Anti Xa result/protocol and bolus will be given after patient is back from cath lab.     PRN Med: Dilaudid IV x 2, oxycodone x 1     Vitals: /80 (BP Location: Right arm)   Pulse 82   Temp 97.5  F (36.4  C) (Oral)   Resp 15   Ht 1.626 m (5' 4\")   Wt 66.3 kg (146 lb 1.6 oz)   SpO2 99%   BMI 25.08 kg/m      A:   Neuro: Ax4 denies headache, dizziness, calls frequently for pain medication despite being oriented the times he got the doses.  Cardiac: SR  Afebrile, VSS. Denies chest pain.   Respiratory: sating >95 on room air. denies SOB. LS diminished on bases  Diet/appetite: NPO for procedure (regular diet previously). A pack of crackers was found at his bedside table and denied eating, shouted at nurse saying if he was seen by anyone eating the crackers. Provider and cath lab RN informed about situation and continued with procedure at 4 pm.   Endocrine: BS check AC & HS. Pt on sliding scale insulin   GI/:  Patient reports that he had no BM for the past 3 days but documentation says otherwise, toilet seat had smear of loose stools this AM. Voiding, urine unmeasured.   Activity: Moves independently, ambulated in hallways.  Skin: Abdominal incision dressing changed in AM.    Line:  PIV L arm SL    Problem: Intestinal Obstruction  Goal: Optimal Bowel Function  Intervention: Promote Bowel Function  Recent Flowsheet Documentation  Taken 1/30/2025 1146 by Yuridia Santos, RN  Body Position: position changed independently  Head of Bed (HOB) Positioning: HOB at 30-45 degrees  Positioning/Transfer Devices:   pillows   in use  Taken 1/30/2025 0745 by Yuridia Santos, RN  Body Position: position changed independently  Head of Bed (HOB) Positioning: HOB at 45 degrees  Positioning/Transfer Devices:   pillows   in use   Goal Outcome Evaluation:         Overall Patient " Progress: no changeOverall Patient Progress: no change    Outcome Evaluation: Dressing changed, pain management, pending angiogram

## 2025-01-30 NOTE — PROGRESS NOTES
Care Management Follow Up    Length of Stay (days): 6    Expected Discharge Date: 01/31/2025     Concerns to be Addressed: discharge planning     Patient plan of care discussed at interdisciplinary rounds: Yes    Anticipated Discharge Disposition: Shelter ([facility] medical respite)     Anticipated Discharge Services: None  Anticipated Discharge DME: none    Patient/family educated on Medicare website which has current facility and service quality ratings: no  Education Provided on the Discharge Plan: Yes  Patient/Family in Agreement with the Plan: yes    Referrals Placed by CM/SW:  Home Care for assist with wound cares and wound care supplies  Private pay costs discussed: Not applicable    Discussed  Partnership in Safe Discharge Planning  document with patient/family: No     Handoff Completed: No, handoff not indicated or clinically appropriate    Additional Information:  Writer spoke to Pt regarding discharge disposition.     Pt assured Writer that he does want to return to [facility] and that he understands and feels capable of his wound cares.     Pt was accepted for home care RN services with [facility]. Writer updated Pt and [facility] RN. Pt will need to be sent with two week's worth of wound care supplies (including Vashe and hydrofera blue); Writer updated Pt's bedside RN. Writer updated Ely that Pt will likely discharge 1/31 and to please refer to discharge instructions from WOC for further wound care details. Home Care order placed.     Pt reported he may need clothes when discharging. Pt did report to Writer that he has sweatpants and a sweatshirt in the room. Writer informed Pt there are clothing supplies at [facility] but if he needs anything when discharging to please request what is needed.     Discharge Resources  [facility] - Discharge location  [address] [place] [address]    [facility] - Home Care RN (they have EPIC access for  orders, but please update when Pt is discharging   [phone]  Fax [phone]    Next Steps: Update [facility] [place] home care agency when Pt discharging. Fax discharge orders to [facility]. Ensure Pt is sent home with 2 weeks worth of wound care supplies.     Jes Trejo RNCC  Covering for 6C [phone]  Phone [phone]    RN Care Coordinator     Social Work and Care Management Department      SEARCHABLE in [facility] - search CARE COORDINATOR       [place] [employer] [place] ([phone]) Saturday & Sunday; ([phone]) [facility] Recognized Holidays     Units: 5A Onc Vocera & 5C Vocera    Units: 6B Vocera & 6C Vocera    Units: 7A SOT RNCC Vocera, 7B Med Surg Vocera, & 7C Med Surg Vocera    Units: 6A Vocera & 4A CVICU Vocera, 4C MICU Vocera, and 4E SICU Vocera    Units: 5 Ortho Vocera & 5 Med Surg Vocera    Units: 6 Med Surg Vocera & 8 Med Surg Vocera

## 2025-01-30 NOTE — PROGRESS NOTES
[facility]    Medicine Progress Note - Hospitalist Service, GOLD TEAM 6    Date of Admission:  1/24/2025    Assessment & Plan     Andrew Wolfe is a 59 year old male with past medical history significant for chronic abdominal pain, type 2 DM, HIV, PTSD, SBO s/p ex lap with small bowel resection (12/20/24) with return to OR on 12/24/24 for anastomotic leak, chronic housing instability admitted for recurrent small bowel obstruction.  Hospitalization further complicated by chest pain overnight 1/28-1/29 with EKG changes and elevated troponin concerning for ACS and type 1 NSTEMI.      # ACS, likely type 1 NSTEMI  # Intermittent chest pain, improving   # HFrEF   Developed sensation of crushing mid-sternal chest pain overnight 1/28-1/29.  EKG with non-specific ST abnormality in inferior leads and ST depression in inferiorly.  Trop at that time elevated to 32.  Repeat EKG with less prominent ST depression in lead I and pain relieved with nitroglycerin tabs.  Serial trops ordered and continued to uptrend, 34 --> 53 --> 72.  Cardiology consulted and recommended heparin gtt, ASA loading, statin, and beta blocker.  TTE with EF 45-50%, mildly decreased LV with apical hypokinesis, and mild aortic insufficiency.  Trop continued to rise throughout afternoon and evening 1/29.  Plan is for cath lab 1/30.  Procedure delayed to the afternoon.    - Appreciate Cards recs   - Monitor for recurrent chest pain  - Continue tele   - Monitor vitals closely   - Continue heparin gtt  - Continue ASA daily   - Continue metoprolol 12.5mg BID      - Continue atorvastatin 40mg daily     # Recurrent SBO, improving    # SBO s/p ex lap w/ small bowel resection on 12/20 with return to OR on 12/24 for anastomotic leak  # Acute pain 2/2 above  Readmitted with nausea, vomiting and recurrent SBO on CT scan. NG deferred due to lack of vomiting.  Had a BM afternoon and evening 1/29.    - General surgery  "signed off  - Advanced to regular diet 1/26  - Continue scheduled simethicone 160mg TID   - Continue bowel regimen Miralax BID, Senna BID, MOM every day  - Oxycodone 4 mg every 4 hours prn, IV dilaudid 0.5 mg every 4 hours prn if unable to take oral or not responding to oral    # Hypomagnesemia   Mag improved.  - Replacement protocol ordered      # Chronic housing instability  Has most recently been [facility] at [facility].  They are agreeable for him to return there when medically ready.      # Type 2 DM   Last Hgb A1c 10% on 1/21/25.  On Lantus 25 PTA, dose decreased here due to NPO and below baseline PO intake.  Last 24 hr BG as follows:   Recent Labs   Lab 01/30/25  1226 01/30/25  0800 01/30/25  0722 01/29/25  2218 01/29/25  2144 01/29/25  1729   * 151* 134* 212* 200* 226*   - Continue Lantus at decreased dose of 15 units daily for now   - Continue sliding scale insulin  - BG checks ac/hs   - Hypoglycemia protocol      # Abdominal wound 2/2 above   - WOCN consult     # History of HIV  - Continue PTA Biktarvy       Diet: NPO per Anesthesia Guidelines for Procedure/Surgery Except for: Meds    DVT Prophylaxis: Pneumatic Compression Devices  Potts Catheter: Not present  Lines: None     Cardiac Monitoring: ACTIVE order. Indication: AMI (NSTEMI/ STEMI) (48 hours)  Code Status: Full Code      Clinically Significant Risk Factors         # Hyponatremia: Lowest Na = 130 mmol/L in last 2 days, will monitor as appropriate  # Hypochloremia: Lowest Cl = 96 mmol/L in last 2 days, will monitor as appropriate      # Hypoalbuminemia: Lowest albumin = 3.1 g/dL at 1/24/2025 12:37 PM, will monitor as appropriate               # DMII: A1C = 10.0 % (Ref range: <5.7 %) within past 6 months   # Overweight: Estimated body mass index is 25.08 kg/m  as calculated from the following:    Height as of this encounter: 1.626 m (5' 4\").    Weight as of this encounter: 66.3 kg (146 lb 1.6 oz).        # Financial/Environmental Concerns: " unemployed  # Housing Instability: noted in nursing assessment         Social Drivers of Health    Food Insecurity: High Risk (1/27/2025)    Food Insecurity     Within the past 12 months, did you worry that your food would run out before you got money to buy more?: Yes     Within the past 12 months, did the food you bought just not last and you didn t have money to get more?: Yes   Housing Stability: High Risk (1/27/2025)    Housing Stability     Do you have housing? : No     Are you worried about losing your housing?: Yes   Tobacco Use: High Risk (1/21/2025)    Patient History     Smoking Tobacco Use: Some Days     Smokeless Tobacco Use: Former   Financial Resource Strain: High Risk (1/27/2025)    Financial Resource Strain     Within the past 12 months, have you or your family members you live with been unable to get utilities (heat, electricity) when it was really needed?: Yes   Alcohol Use: Unknown (8/27/2020)    AUDIT-C     Average Number of Drinks: Patient declined   Transportation Needs: High Risk (1/27/2025)    Transportation Needs     Within the past 12 months, has lack of transportation kept you from medical appointments, getting your medicines, non-medical meetings or appointments, work, or from getting things that you need?: Yes    [facility]    Social Connections          Disposition Plan     Medically Ready for Discharge: Anticipated in 2-4 Days      The patient's care was discussed with the Attending Physician, Dr. Mariza Seay .    Roro Gomes, [facility]  Hospitalist Service, GOLD TEAM [address] [place] [address]  Securely message with [employer] (more info)  Text page via [facility] Paging/Directory   See signed in provider for up to date coverage information  ______________________________________________________________________    Interval History   Andrew is resting in bed.  No  complaints of chest pain on assessment. Anxious about discharging.  Feeling better than yesterday.  No dyspnea or abdominal pain.   Physical Exam   Vital Signs: Temp: 97.3  F (36.3  C) Temp src: Oral BP: 112/77 Pulse: 86   Resp: 20 SpO2: 100 % O2 Device: None (Room air)    Weight: 146 lbs 1.6 oz    GENERAL: Alert and oriented x 3. Well nourished, well developed.  Petite body habitus.  No acute distress.    HEENT: Normocephalic, atraumatic. Anicteric sclera. Mucous membranes moist.   CV: RRR. S1, S2. No murmurs appreciated.   RESPIRATORY: Effort normal on room air. Lungs CTAB with no wheezing, rales, or rhonchi.   GI: Abdomen soft, non distended, bowel sounds present x all 4 quadrants.  Abdominal wound covered and dressed with gauze and abdominal pad.     NEUROLOGICAL: No focal deficits. Follows commands.  Strength equal in upper and lower extremities.   MUSCULOSKELETAL: No joint swelling or tenderness. Moves all extremities.   EXTREMITIES: No gross deformities. No peripheral edema.   SKIN: Grossly warm, dry, and intact. No jaundice. No rashes.       Medical Decision Making       45 MINUTES SPENT BY ME on the date of service doing chart review, history, exam, documentation & further activities per the note.      Data     I have personally reviewed the following data over the past 24 hrs:    7.9  \   9.1 (L)   / 495 (H)     N/A N/A N/A /  212 (H)   N/A N/A N/A \     Trop: 318 (HH) BNP: N/A       Imaging results reviewed over the past 24 hrs:   Recent Results (from the past 24 hours)   Echo Complete   Result Value    LVEF  45-50% (mildly reduced)    Narrative    [id number]  [id number]  [id number]  [id number]^ERIK^LILIANE^JOSE ALFREDO     [facility]  Echocardiography Laboratory  [address] [place] [id number]     Name: BREE GARCIA  MRN: [id number]  : 1965  Study Date: 2025 08:33 AM  Age: 59 yrs  Gender: Male  Patient Location: [facility]  Reason For Study: Chest  Pain  Ordering Physician: LILIANE VALENCIA  Performed By: Charbel Chen     BSA: 1.7 m2  Height: 64 in  Weight: 146 lb  BP: 116/71 mmHg  ______________________________________________________________________________  Procedure  Echocardiogram with two-dimensional, color and spectral Doppler.  ______________________________________________________________________________  Interpretation Summary  Left ventricular function is decreased. The ejection fraction is 45-50%  (mildly reduced). Left ventricular apical hypokinesis is present.  Global right ventricular function is normal.  Mild aortic insufficiency is present.  Pulmonary artery systolic pressure is normal.  Estimated mean right atrial pressure is normal.  No pericardial effusion is present.  ______________________________________________________________________________  Left Ventricle  Left ventricular size is normal. Left ventricular function is decreased. The  ejection fraction is 45-50% (mildly reduced). Left ventricular apical  hypokinesis is present.     Right Ventricle  The right ventricle is normal size. Global right ventricular function is  normal.     Atria  Both atria appear normal.     Mitral Valve  The mitral valve is normal.     Aortic Valve  Mild aortic valve calcification is present. Mild aortic insufficiency is  present.     Tricuspid Valve  The tricuspid valve is normal. Trace tricuspid insufficiency is present. The  right ventricular systolic pressure is approximated at 17.5 mmHg plus the  right atrial pressure. Pulmonary artery systolic pressure is normal.     Pulmonic Valve  Trace pulmonic insufficiency is present.     Vessels  The aorta root is normal. The inferior vena cava was normal in size with  preserved respiratory variability. Estimated mean right atrial pressure is  normal.     Pericardium  No pericardial effusion is present.     Compared to Previous Study  There is no prior study for direct  comparison.  ______________________________________________________________________________  MMode/2D Measurements & Calculations  IVSd: 0.88 cm  LVIDd: 4.5 cm  LVPWd: 0.98 cm  LV mass(C)d: 139.0 grams  LV mass(C)dI: 81.2 grams/m2  Ao root diam: 3.5 cm  asc Aorta Diam: 3.6 cm  LVOT diam: 2.1 cm  LVOT area: 3.4 cm2  Ao root diam index Ht(cm/m): 2.2  Ao root diam index BSA (cm/m2): 2.0  Asc Ao diam index BSA (cm/m2): 2.1  Asc Ao diam index Ht(cm/m): 2.2  EF Biplane: 51.2 %  LA Volume (BP): 36.0 ml     LA Volume Index (BP): 21.1 ml/m2  RWT: 0.43     Doppler Measurements & Calculations  MV E max heriberto: 36.5 cm/sec  MV A max heriberto: 64.9 cm/sec  MV E/A: 0.56  Ao V2 max: 103.6 cm/sec  Ao max P.3 mmHg  Ao V2 mean: 72.3 cm/sec  Ao mean P.4 mmHg  Ao V2 VTI: 17.0 cm  SIDNEY(I,D): 3.4 cm2  SIDNEY(V,D): 3.1 cm2  AI P1/2t: 491.0 msec  LV V1 max PG: 3.6 mmHg  LV V1 max: 94.8 cm/sec  LV V1 VTI: 17.0 cm  SV(LVOT): 57.4 ml  SI(LVOT): 33.5 ml/m2  TR max heriberto: 209.0 cm/sec  TR max P.5 mmHg  AV Heriberto Ratio (DI): 0.92  SIDNEY Index (cm2/m2): 2.0  E/E' av.2     Lateral E/e': 5.1  Medial E/e': 7.3     ______________________________________________________________________________  Report approved by: Ashley Elizondo MD on 2025 09:23 AM

## 2025-01-30 NOTE — PLAN OF CARE
"Goal Outcome Evaluation:      Plan of Care Reviewed With: patient      /74 (BP Location: Right arm, Patient Position: Semi-Post's, Cuff Size: Adult Regular)   Pulse 91   Temp 98.6  F (37  C) (Oral)   Resp 18   Ht 1.626 m (5' 4\")   Wt 66.2 kg (146 lb)   SpO2 98%   BMI 25.06 kg/m          Neuro: Alert & oriented x 4, calls appropriately  Respiratory: on room air denies SOB  Cardiac: WDL denies cardiac chest pain this shift. On telemetry   Diet: Regular tolerating with good appetite   GI/: +BS loose stool this morning, bowel meds held, Voiding without difficulties.   Incision/Drains: Midline abdominal incision dressing CDI.   Line: left PIV infusing Hep. gtt  Pain: reports abdominal pain managed with IV dilaudid and PO oxycodone  Labs: Reviewed  Activity: up ad lexi in room.  Change : NPO at mid night for angiogram tomorrow  Plan: Continue with current POC     "

## 2025-01-31 LAB
ANION GAP SERPL CALCULATED.3IONS-SCNC: 10 MMOL/L (ref 7–15)
ATRIAL RATE - MUSE: 78 BPM
ATRIAL RATE - MUSE: 81 BPM
BUN SERPL-MCNC: 12.3 MG/DL (ref 8–23)
CALCIUM SERPL-MCNC: 8.5 MG/DL (ref 8.8–10.4)
CHLORIDE SERPL-SCNC: 100 MMOL/L (ref 98–107)
CREAT SERPL-MCNC: 0.7 MG/DL (ref 0.67–1.17)
DIASTOLIC BLOOD PRESSURE - MUSE: NORMAL MMHG
DIASTOLIC BLOOD PRESSURE - MUSE: NORMAL MMHG
EGFRCR SERPLBLD CKD-EPI 2021: >90 ML/MIN/1.73M2
ERYTHROCYTE [DISTWIDTH] IN BLOOD BY AUTOMATED COUNT: 15.1 % (ref 10–15)
GLUCOSE BLDC GLUCOMTR-MCNC: 136 MG/DL (ref 70–99)
GLUCOSE BLDC GLUCOMTR-MCNC: 157 MG/DL (ref 70–99)
GLUCOSE BLDC GLUCOMTR-MCNC: 206 MG/DL (ref 70–99)
GLUCOSE SERPL-MCNC: 182 MG/DL (ref 70–99)
HCO3 SERPL-SCNC: 21 MMOL/L (ref 22–29)
HCT VFR BLD AUTO: 25.3 % (ref 40–53)
HGB BLD-MCNC: 8.1 G/DL (ref 13.3–17.7)
INTERPRETATION ECG - MUSE: NORMAL
INTERPRETATION ECG - MUSE: NORMAL
MAGNESIUM SERPL-MCNC: 1.7 MG/DL (ref 1.7–2.3)
MCH RBC QN AUTO: 26.2 PG (ref 26.5–33)
MCHC RBC AUTO-ENTMCNC: 32 G/DL (ref 31.5–36.5)
MCV RBC AUTO: 82 FL (ref 78–100)
P AXIS - MUSE: 27 DEGREES
P AXIS - MUSE: 29 DEGREES
PHOSPHATE SERPL-MCNC: 3.5 MG/DL (ref 2.5–4.5)
PLATELET # BLD AUTO: 451 10E3/UL (ref 150–450)
POTASSIUM SERPL-SCNC: 3.8 MMOL/L (ref 3.4–5.3)
PR INTERVAL - MUSE: 136 MS
PR INTERVAL - MUSE: 140 MS
QRS DURATION - MUSE: 102 MS
QRS DURATION - MUSE: 94 MS
QT - MUSE: 412 MS
QT - MUSE: 446 MS
QTC - MUSE: 469 MS
QTC - MUSE: 518 MS
R AXIS - MUSE: -32 DEGREES
R AXIS - MUSE: -33 DEGREES
RBC # BLD AUTO: 3.09 10E6/UL (ref 4.4–5.9)
SODIUM SERPL-SCNC: 131 MMOL/L (ref 135–145)
SYSTOLIC BLOOD PRESSURE - MUSE: NORMAL MMHG
SYSTOLIC BLOOD PRESSURE - MUSE: NORMAL MMHG
T AXIS - MUSE: 31 DEGREES
T AXIS - MUSE: 44 DEGREES
VENTRICULAR RATE- MUSE: 78 BPM
VENTRICULAR RATE- MUSE: 81 BPM
WBC # BLD AUTO: 7.5 10E3/UL (ref 4–11)

## 2025-01-31 PROCEDURE — 250N000013 HC RX MED GY IP 250 OP 250 PS 637: Performed by: NURSE PRACTITIONER

## 2025-01-31 PROCEDURE — 250N000013 HC RX MED GY IP 250 OP 250 PS 637: Performed by: STUDENT IN AN ORGANIZED HEALTH CARE EDUCATION/TRAINING PROGRAM

## 2025-01-31 PROCEDURE — 250N000011 HC RX IP 250 OP 636: Performed by: STUDENT IN AN ORGANIZED HEALTH CARE EDUCATION/TRAINING PROGRAM

## 2025-01-31 PROCEDURE — 80048 BASIC METABOLIC PNL TOTAL CA: CPT

## 2025-01-31 PROCEDURE — 84100 ASSAY OF PHOSPHORUS: CPT

## 2025-01-31 PROCEDURE — 120N000003 HC R&B IMCU UMMC

## 2025-01-31 PROCEDURE — 99232 SBSQ HOSP IP/OBS MODERATE 35: CPT | Mod: GC | Performed by: INTERNAL MEDICINE

## 2025-01-31 PROCEDURE — 99207 PR APP CREDIT; MD BILLING SHARED VISIT: CPT | Mod: FS | Performed by: NURSE PRACTITIONER

## 2025-01-31 PROCEDURE — 250N000013 HC RX MED GY IP 250 OP 250 PS 637: Performed by: INTERNAL MEDICINE

## 2025-01-31 PROCEDURE — 36415 COLL VENOUS BLD VENIPUNCTURE: CPT

## 2025-01-31 PROCEDURE — 83735 ASSAY OF MAGNESIUM: CPT

## 2025-01-31 PROCEDURE — 999N000197 HC STATISTIC WOC PT EDUCATION, 0-15 MIN

## 2025-01-31 PROCEDURE — 250N000013 HC RX MED GY IP 250 OP 250 PS 637: Performed by: PHYSICIAN ASSISTANT

## 2025-01-31 PROCEDURE — 85014 HEMATOCRIT: CPT

## 2025-01-31 PROCEDURE — 99233 SBSQ HOSP IP/OBS HIGH 50: CPT | Mod: FS | Performed by: STUDENT IN AN ORGANIZED HEALTH CARE EDUCATION/TRAINING PROGRAM

## 2025-01-31 RX ADMIN — POLYETHYLENE GLYCOL 3350 17 G: 17 POWDER, FOR SOLUTION ORAL at 09:03

## 2025-01-31 RX ADMIN — PANTOPRAZOLE SODIUM 40 MG: 40 TABLET, DELAYED RELEASE ORAL at 15:30

## 2025-01-31 RX ADMIN — Medication 12.5 MG: at 20:04

## 2025-01-31 RX ADMIN — MAGNESIUM HYDROXIDE 30 ML: 400 SUSPENSION ORAL at 11:29

## 2025-01-31 RX ADMIN — BICTEGRAVIR SODIUM, EMTRICITABINE, AND TENOFOVIR ALAFENAMIDE FUMARATE 1 TABLET: 50; 200; 25 TABLET ORAL at 09:04

## 2025-01-31 RX ADMIN — SIMETHICONE CHEW TAB 80 MG 160 MG: 80 TABLET ORAL at 20:03

## 2025-01-31 RX ADMIN — Medication 12.5 MG: at 09:04

## 2025-01-31 RX ADMIN — PANTOPRAZOLE SODIUM 40 MG: 40 TABLET, DELAYED RELEASE ORAL at 09:04

## 2025-01-31 RX ADMIN — PRASUGREL 10 MG: 10 TABLET, FILM COATED ORAL at 09:08

## 2025-01-31 RX ADMIN — OXYCODONE HYDROCHLORIDE 5 MG: 5 TABLET ORAL at 11:29

## 2025-01-31 RX ADMIN — SENNOSIDES 8.6 MG: 8.6 TABLET ORAL at 20:04

## 2025-01-31 RX ADMIN — HYDROMORPHONE HYDROCHLORIDE 0.5 MG: 1 INJECTION, SOLUTION INTRAMUSCULAR; INTRAVENOUS; SUBCUTANEOUS at 23:51

## 2025-01-31 RX ADMIN — ASPIRIN 81 MG CHEWABLE TABLET 81 MG: 81 TABLET CHEWABLE at 09:04

## 2025-01-31 RX ADMIN — BISACODYL 10 MG: 10 SUPPOSITORY RECTAL at 02:50

## 2025-01-31 RX ADMIN — SIMETHICONE CHEW TAB 80 MG 160 MG: 80 TABLET ORAL at 15:28

## 2025-01-31 RX ADMIN — POLYETHYLENE GLYCOL 3350 17 G: 17 POWDER, FOR SOLUTION ORAL at 20:04

## 2025-01-31 RX ADMIN — HYDROMORPHONE HYDROCHLORIDE 0.5 MG: 1 INJECTION, SOLUTION INTRAMUSCULAR; INTRAVENOUS; SUBCUTANEOUS at 09:09

## 2025-01-31 RX ADMIN — ATORVASTATIN CALCIUM 40 MG: 40 TABLET, FILM COATED ORAL at 20:04

## 2025-01-31 RX ADMIN — OXYCODONE HYDROCHLORIDE 5 MG: 5 TABLET ORAL at 22:15

## 2025-01-31 RX ADMIN — HYDROMORPHONE HYDROCHLORIDE 0.5 MG: 1 INJECTION, SOLUTION INTRAMUSCULAR; INTRAVENOUS; SUBCUTANEOUS at 19:41

## 2025-01-31 RX ADMIN — SIMETHICONE CHEW TAB 80 MG 160 MG: 80 TABLET ORAL at 09:04

## 2025-01-31 RX ADMIN — HYDROMORPHONE HYDROCHLORIDE 0.5 MG: 1 INJECTION, SOLUTION INTRAMUSCULAR; INTRAVENOUS; SUBCUTANEOUS at 15:29

## 2025-01-31 RX ADMIN — INSULIN GLARGINE 15 UNITS: 100 INJECTION, SOLUTION SUBCUTANEOUS at 09:03

## 2025-01-31 RX ADMIN — OXYCODONE HYDROCHLORIDE 5 MG: 5 TABLET ORAL at 18:09

## 2025-01-31 RX ADMIN — SENNOSIDES 8.6 MG: 8.6 TABLET ORAL at 09:04

## 2025-01-31 RX ADMIN — BISACODYL 10 MG: 10 SUPPOSITORY RECTAL at 15:29

## 2025-01-31 RX ADMIN — HYDROMORPHONE HYDROCHLORIDE 0.5 MG: 1 INJECTION, SOLUTION INTRAMUSCULAR; INTRAVENOUS; SUBCUTANEOUS at 01:16

## 2025-01-31 ASSESSMENT — ACTIVITIES OF DAILY LIVING (ADL)
ADLS_ACUITY_SCORE: 37
ADLS_ACUITY_SCORE: 36
ADLS_ACUITY_SCORE: 37
ADLS_ACUITY_SCORE: 38
ADLS_ACUITY_SCORE: 36
ADLS_ACUITY_SCORE: 37
ADLS_ACUITY_SCORE: 38
ADLS_ACUITY_SCORE: 37
ADLS_ACUITY_SCORE: 37

## 2025-01-31 NOTE — PROGRESS NOTES
Care Management Follow Up    Length of Stay (days): 7    Expected Discharge Date: 02/03/2025     Concerns to be Addressed: discharge planning     Patient plan of care discussed at interdisciplinary rounds: Yes    Anticipated Discharge Disposition: Shelter (pt to return back to [facility])    [facility]  [address] [place] [address]  Temporary address: [address] [place] [address]  Phone: [phone]    (Nimo Moe): [phone]  Emergency Shelter Manager (Ju): [phone]  Fax: [phone] (for sending discharge orders)  - 1/31: DAYNA sent Sandra (RN at the shelter and apart of admissions team) the below update from [facility] providers via Teams. No admissions/nursing staff are available on weekends.            Anticipated Discharge Services: home care. See RNCC note 1/30 for details.   Anticipated Discharge DME: n/a    Patient/family educated on Medicare website which has current facility and service quality ratings: no  Education Provided on the Discharge Plan: Yes  Patient/Family in Agreement with the Plan: yes    Referrals Placed by CM/SW:  n/a  Private pay costs discussed: Not applicable    Discussed  Partnership in Safe Discharge Planning  document with patient/family: No     Handoff Completed: No, handoff not indicated or clinically appropriate    Additional Information:  Per [facility] provider team, they are discussing possible NG to manage small bowel obstruction but are currently holding for now and if he even got it, it would be removed before discharge.     Next Steps: DAYNA to follow up with Sandra on Monday 2/3  ___________________    RADHA Win, LGSW  6C , covering beds 4231 to 6519  [facility]   Phone: [phone]  6C Cards DAYNA Donaldson

## 2025-01-31 NOTE — PLAN OF CARE
"Neuro: A&Ox4. Mostly in bed today, gets up to the bathroom. Patient yelling to nurse for not waking him up for his Dilaudid, verbal teachings for pain management provided.   Cardiac: SR, HR 80s. VSS. Denies chest pain.   /80 (BP Location: Right arm, Cuff Size: Adult Regular)   Pulse 93   Temp 97.7  F (36.5  C) (Oral)   Resp 18   Ht 1.626 m (5' 4\")   Wt 65.7 kg (144 lb 12.8 oz)   SpO2 98%   BMI 24.85 kg/m    Respiratory: Sating well on RA. Denies SOB.   GI/: Abdominal pain 10/10, dilaudid given. Pre-medicated with oxy prior to dressing change today, WO has new wound care order. Patient educated on wound dressing, verbalized understanding. Feels bloated and constipated-Dulcolax suppository given.   Diet/appetite: Refused to order breakfast and lunch, encouraged intake. Ordered dinner, regular diet.   Activity: SBA  Pain: Complains of 10/10 abdominal pain, Oxy and dilaudid given with some relief.   Skin: No new deficits noted.  LDA's: Left PIV CDI.     Plan: Continue with POC. Notify primary team with changes.      Problem: Intestinal Obstruction  Goal: Absence of Infection Signs and Symptoms  Intervention: Prevent or Manage Infection  Recent Flowsheet Documentation  Taken 1/31/2025 0900 by Nicky Martinez, RN  Isolation Precautions: contact precautions maintained     Problem: Adult Inpatient Plan of Care  Goal: Absence of Hospital-Acquired Illness or Injury  Intervention: Prevent Infection  Recent Flowsheet Documentation  Taken 1/31/2025 0900 by Nicky Martinez, RN  Infection Prevention: single patient room provided     Problem: Adult Inpatient Plan of Care  Goal: Absence of Hospital-Acquired Illness or Injury  Intervention: Identify and Manage Fall Risk  Recent Flowsheet Documentation  Taken 1/31/2025 0900 by Nicky Martinez, RN  Safety Promotion/Fall Prevention: assistive device/personal items within reach  Intervention: Prevent and Manage VTE (Venous Thromboembolism) Risk  Recent Flowsheet " Documentation  Taken 1/31/2025 0900 by Nicky Martinez, RN  VTE Prevention/Management: SCDs off (sequential compression devices)  Intervention: Prevent Infection  Recent Flowsheet Documentation  Taken 1/31/2025 0900 by Nicky Martinez, RN  Infection Prevention: single patient room provided

## 2025-01-31 NOTE — CONSULTS
Discharge Pharmacy Test Claim    Clopidogrel and prasugrel are both covered with $0 copays [facility].    Test Claim Copay   clopidogrel 0.00   prasugrel 0.00     Lucy Mario  [facility] Pharmacy Liaison, [place]HAEL  Ph: [phone]  Fax: [phone]  Available on Teams and Vocera  Disclaimer: Pharmacy test claims are estimates and may not reflect final costs. Suggested alternatives aim to be cost-effective and may not be therapeutically equivalent. This consult is informational and does not constitute medical advice. Clinical decisions should be made by qualified healthcare providers.

## 2025-01-31 NOTE — PROGRESS NOTES
[facility]    Medicine Progress Note - Hospitalist Service, GOLD TEAM 6    Date of Admission:  1/24/2025    Assessment & Plan     Andrew Wolfe is a 59 year old male with past medical history significant for chronic abdominal pain, type 2 DM, HIV, PTSD, SBO s/p ex lap with small bowel resection (12/20/24) with return to OR on 12/24/24 for anastomotic leak, chronic housing instability admitted for recurrent small bowel obstruction.  Hospitalization further complicated by chest pain overnight 1/28-1/29 with EKG changes and elevated troponin concerning for ACS and type 1 NSTEMI.      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  EKG with non-specific ST abnormality in inferior leads and ST depression in inferiorly and uptrending troponin.  Cardiology consulted and recommended heparin gtt, ASA loading, statin, and beta blocker.  TTE with EF 45-50%, mildly decreased LV with apical hypokinesis, and mild aortic insufficiency.  Trop continued to rise throughout afternoon and evening 1/29.  Underwent coronary angio on 1/30 which showed three vessel CAD with severe stenosis of middle and distal LAD.  S/p PCI with SHELBY x 2.    - Cardiology following, recs as below:   GDMT:   - Statin: atorvastatin 40mg daily   - Beta blocker: metoprolol succinate 25mg daily   - ACE/ARB/ARNI: losartan 12.5mg daiy   - DAPT: ASA 81mg daily (indefinitely) and prasugrel 10mg daily (12 months)   - Monitor for recurrent chest pain  - Continue tele   - Monitor vitals closely     # Recurrent SBO, improving    # SBO s/p ex lap w/ small bowel resection on 12/20 with return to OR on 12/24 for anastomotic leak  # Acute pain 2/2 above  Readmitted with nausea, vomiting and recurrent SBO on CT scan. NG deferred due to lack of vomiting.  Having more regular BMs now.     - General surgery signed off  - Advanced to regular diet 1/26  -  Continue scheduled simethicone 160mg TID   - Continue bowel regimen Miralax BID, Senna BID, MOM every day  - Oxycodone 4 mg every 4 hours prn, IV dilaudid 0.5 mg every 4 hours prn if unable to take oral or not responding to oral    # Hypomagnesemia   Mag improved.  - Replacement protocol ordered      # Chronic housing instability  Has most recently been staying at [facility].  They are agreeable for him to return there when medically ready.      # Type 2 DM   Last Hgb A1c 10% on 1/21/25.  On Lantus 25 PTA, dose decreased here due to NPO and below baseline PO intake.  Last 24 hr BG as follows:   Recent Labs   Lab 01/31/25  0758 01/30/25  2220 01/30/25  2032 01/30/25  1226 01/30/25  0800 01/30/25  0722   * 91 92 197* 151* 134*   - Continue Lantus at decreased dose of 15 units daily for now   - Continue sliding scale insulin  - BG checks ac/hs   - Hypoglycemia protocol      # Abdominal wound 2/2 above   - WOCN consult     # History of HIV  - Continue PTA Biktarvy       Diet: Advance Diet as Tolerated: Clear Liquid Diet    DVT Prophylaxis: Pneumatic Compression Devices  Potts Catheter: Not present  Lines: None     Cardiac Monitoring: ACTIVE order. Indication: Post- PCI/Angiogram (24 hours)  Code Status: Full Code      Clinically Significant Risk Factors         # Hyponatremia: Lowest Na = 131 mmol/L in last 2 days, will monitor as appropriate     # Hypomagnesemia: Lowest Mg = 1.5 mg/dL in last 2 days, will replace as needed   # Hypoalbuminemia: Lowest albumin = 3.1 g/dL at 1/24/2025 12:37 PM, will monitor as appropriate               # DMII: A1C = 10.0 % (Ref range: <5.7 %) within past 6 months         # Financial/Environmental Concerns: unemployed  # Housing Instability: noted in nursing assessment         Social Drivers of Health    Food Insecurity: High Risk (1/27/2025)    Food Insecurity     Within the past 12 months, did you worry that your food would run out before you got money to buy more?: Yes      Within the past 12 months, did the food you bought just not last and you didn t have money to get more?: Yes   Housing Stability: High Risk (1/27/2025)    Housing Stability     Do you have housing? : No     Are you worried about losing your housing?: Yes   Tobacco Use: High Risk (1/21/2025)    Patient History     Smoking Tobacco Use: Some Days     Smokeless Tobacco Use: Former   Financial Resource Strain: High Risk (1/27/2025)    Financial Resource Strain     Within the past 12 months, have you or your family members you live with been unable to get utilities (heat, electricity) when it was really needed?: Yes   Alcohol Use: Unknown (8/27/2020)    AUDIT-C     Average Number of Drinks: Patient declined   Transportation Needs: High Risk (1/27/2025)    Transportation Needs     Within the past 12 months, has lack of transportation kept you from medical appointments, getting your medicines, non-medical meetings or appointments, work, or from getting things that you need?: Yes    Received from [facility] [employer] [facility], [facility]    Social Connections          Disposition Plan     Medically Ready for Discharge: Anticipated in 2-4 Days      The patient's care was discussed with the Attending Physician, Dr. Mariza Seay .    Roro Gomes, [facility]  Hospitalist Service, [address]  Securely message with [employer] (more info)  Text page via [facility] Paging/Directory   See signed in provider for up to date coverage information  ______________________________________________________________________    Interval History   Andrew is resting in bed.  Doing better.  No chest pain.  Still having gas pain.      Physical Exam   Vital Signs: Temp: 97.7  F (36.5  C) Temp src: Oral BP: 127/82 Pulse: 92   Resp: 18 SpO2: 100 % O2 Device: None (Room air)    Weight: 144 lbs 12.8 oz    GENERAL: Alert and oriented x 3. Well nourished, well  developed.  Petite body habitus.  No acute distress.    HEENT: Normocephalic, atraumatic. Anicteric sclera. Mucous membranes moist.   CV: RRR. S1, S2. No murmurs appreciated.   RESPIRATORY: Effort normal on room air. Lungs CTAB with no wheezing, rales, or rhonchi.   GI: Abdomen soft, non distended, bowel sounds present x all 4 quadrants.  Abdominal wound covered and dressed with gauze and abdominal pad.     NEUROLOGICAL: No focal deficits. Follows commands.  Strength equal in upper and lower extremities.   MUSCULOSKELETAL: No joint swelling or tenderness. Moves all extremities.   EXTREMITIES: No gross deformities. No peripheral edema.   SKIN: Grossly warm, dry, and intact. No jaundice. No rashes.       Medical Decision Making       55 MINUTES SPENT BY ME on the date of service doing chart review, history, exam, documentation & further activities per the note.      Data     I have personally reviewed the following data over the past 24 hrs:    7.5  \   8.1 (L)   / 451 (H)     131 (L) 100 12.3 /  182 (H)   3.8 21 (L) 0.70 \       Imaging results reviewed over the past 24 hrs:   Recent Results (from the past 24 hours)   Cardiac Catheterization    Narrative      Three vessel coronary artery disease.    Threre were significant stenoses in the proximal and aeq-oh-iqmcbe LAD,   consistent with culprits of NSTEMI.    Successful IVUS-guided PCI of the proximal LAD and [id number]-hw-[id number] LAD   lesions: a 3.5 x 20 mm Synergy drug-eluting stent placed at   huesiy-ur-ctqeohvn LAD, post-dilated with a 4.0mm NC balloon. A 2.5 x 20   mm Synergy SHELBY placed at [id number]-eq-[id number] LAD, post-dilated with a 2.5mm NC   balloon. Post-PCI IVUS confirmed good apposition and expansion of the   stents without edge dissection.    Hemostasis achieved with 8 Fr angioseal at right femoral artery access   and with TR band at right radial artery access.    Loaded with Prasugrel 60mg in the cath lab.    Continue Aspirin 81 mg po daily indefinitely and  Prasugrel 10 mg po daily   for at least a year.  Continue medical management of the residual CAD.  Continue risk factor modification.

## 2025-01-31 NOTE — PLAN OF CARE
"Neuro: A&Ox4, able to make needs known, calls appropriately  Cardiac: Tele in place, SR, HR 80s  Respiratory: O2 sats > 92 on room air  Diet: Regular  GI/: Date of last full BM somewhat unclear, chart shows BM on 1/29 on 7B, pt then stated last BM \"three days ago.\"  Endorses abdominal discomfort and constipation. Gave scheduled simethicone, Miralax, senna and PRN Dulcolax suppository this shift. Passing gas. Per pt report, smear around 0400. Voiding spontaneously, not well measured (up ad lexi not always saving)  Endocrine: ACHS BG checks  Skin: Midline abdominal incision, dressing changes BID, last changed 1/30 at approximately 2230. No new deficits.  Pain: Complained of abdominal pain, pain in right hand upon returning from the cath lab, general pain in bilateral legs. Pain managed by PRN IV Dilaudid and PRN oral oxycodone   Activity: Up ad lexi, independent in room following appropriate bedrest upon return from cath lab  Access: L PIV, SL    Other: TR band removed successfully. Some bleeding noted after removing 4ccs air on initial attempt, pressure applied, band re-inflated. Second attempt two hours later, deflated with no bleeding. CMS checks WNL. Pt to be discharged with two weeks' worth of supplies for dressing changes      Problem: Adult Inpatient Plan of Care  Goal: Plan of Care Review  Description: The Plan of Care Review/Shift note should be completed every shift.  The Outcome Evaluation is a brief statement about your assessment that the patient is improving, declining, or no change.  This information will be displayed automatically on your shift  note.  Outcome: Progressing  Flowsheets (Taken 1/31/2025 0235)  Outcome Evaluation: Dressing changed, pain managed with PRN Dilaudid and oxycodone, angiogram access sites WNL  Plan of Care Reviewed With: patient  Overall Patient Progress: no change   Goal Outcome Evaluation:      Plan of Care Reviewed With: patient    Overall Patient Progress: no changeOverall " Patient Progress: no change    Outcome Evaluation: Dressing changed, pain managed with PRN Dilaudid and oxycodone, angiogram access sites WNL

## 2025-01-31 NOTE — PROGRESS NOTES
Cardiology Consult Note     Date of Service: 01/31/2025  Patient: Andrew Collier  MRN: [id number]  Admission Date: 1/24/2025  Hospital Day: 7    Reason for Consult: substernal chest pain and elevated troponin    Cardiology Problem List:  NSTEMI, Type 1  HFmrEF (45-50%)    Assessment:   59 year old male with PMH including DM, HIV, recent SBO s/p ex lap w small bowel resection (12/20/24) c/b anastomotic leak, DIANA who is admitted for recurrent SBO and now found to have an NSTEMI.    NSTEMI, Type I  Pt with multiple recurrent episodes of substernal radiating chest pain in the setting of sharply rising troponins. No ST elevations appreciated on EKG. TTE shows a reduction in the EF and apical wall hypokinesis  (no prior TTE to confirm whether or not this new). Overall, his presentation was concerning for an NSTEMI, and likely underlying obstructive CAD. Mr Fabián Collier underwent coronary angiography on 01/30/25 which demonstrated 3-vessel coronary artery disease with severe stenoses of the middle and distal left anterior descending artery. Both of these lesions underwent successful PCI.     Heart failure with mildly reduced ejection fraction, compensated  Ischemic Cardiomyopathy  Diagnosed on TTE (01/29). Etiology is ischemic cardiomyopathy. Currently euvolemic on examination so evidence of decompensation. Guideline directed medical therapy is indicated, please see below for initiation recommendations.    Recommendations:   S/p Coronary angiogram on 1/30 with PCI to the mid and distal LAD  Continue daily aspirin 81 mg  Loaded with Prasugrel in the cath lab, continue Prasugrel 10 mg daily for 12 months ($0 copay)  Continue GDMT:  Statin: Atorvastatin 40 mg  BB: Metoprolol tartrate 12.5 mg BID -> switch to Metoprolol Succinate 25 mg daily   ACE/ARB/ARNI: Start Losartan 12.5 mg daily with close monitoring of BP for tolerance  If new chest pain, HD instability, or electrical instability, notify Cardiology for  "urgent evaluation     Thank you for this consultation. Please contact the Cardiology 1 fellow at [phone] with any questions or concerns. Cardiology will sign off at this time.    Parish Lee MD [facility], PGY-6  Fellow, Cardiovascular Disease        Subjective   History of Present Illness:    Andrew Collier is a 59 year old male with a history of DM, HIV, SBO s/p ex lap c/b anastomotic leak who is admitted for recurrent SBO.    Started experiencing severe 10/10 substernal chest pain 1/28 evening with radiation to the L chest and down the L arm. Reports his left arm felt \"cold\". 4 episodes of chest pain in total, lasting 40 mins to an hour at a time. He did have some relief with sublingual nitroglycerin. Has never experienced similar pain in the past.    EKG at the time with nonspecific ST segment changes in the inferior leads. Discussed with Cardiology overnight and started heparin gtt, ASA load, Lipitor 40 mg at bedtime, Lopressor 12.5 mg BID. TTE 1/29 with reduced EF (45-50%) and LV apical hypokinesis.     No chest pain or shortness of breath today. He does currently smoke and reports last use 1 week ago. No previous cardiac history. Groin site intact without evidence of hematoma.      Review of Systems:  A comprehensive ROS was performed and found to be negative or non-contributory with the exception of that noted in the HPI above.    Past Medical History:   Diagnosis Date    Acute appendicitis with localized peritonitis 01/31/2018    AIDS (H)     Allergic rhinitis due to other allergen     DNS    Anal dysplasia     Chronic abdominal pain     CNS toxoplasmosis (H)     COVID-19 01/11/2022    Diabetes type 2, controlled (H)     GERD (gastroesophageal reflux disease)     Herpes zoster 09/23/2016    History of seizure     History of substance abuse (H)     HIV (human immunodeficiency virus infection) (H)     HLD (hyperlipidemia)     Lung nodules     Periungual wart     Pneumonia 01/06/2019    PTSD " (post-traumatic stress disorder)     Sleep apnea     doesn't use CPAP     Past Surgical History:   Procedure Laterality Date    ANOSCOPY N/A 09/09/2020    Procedure: Exam Under Anesthesia, ANOSCOPY, fulgeration of rectal fissures with Rectal Biopsies;  Surgeon: Thanh Lundberg MD;  Location: [facility] OR    COLONOSCOPY Left 01/22/2016    Procedure: COMBINED COLONOSCOPY, SINGLE OR MULTIPLE BIOPSY/POLYPECTOMY BY BIOPSY;  Surgeon: Clark Saini MD;  Location: [facility] GI    CV CORONARY ANGIOGRAM N/A 1/30/2025    Procedure: Coronary Angiogram;  Surgeon: Yeo, Ilhwan, MD;  Location:  [facility]ATH [facility]    CV INTRAVASULAR ULTRASOUND N/A 1/30/2025    Procedure: Intravascular Ultrasound;  Surgeon: Yeo, Ilhwan, MD;  Location:  [facility] CATH [facility]    CV PCI N/A 1/30/2025    Procedure: Percutaneous Coronary Intervention;  Surgeon: Yeo, Ilhwan, MD;  Location:  HE[facility]ARD[facility] CATH LAB    ESOPHAGOSCOPY, GASTROSCOPY, DUODENOSCOPY (EGD), COMBINED N/A 06/06/2022    Procedure: ESOPHAGOGASTRODUODENOSCOPY, WITH BIOPSY;  Surgeon: Kecia Benítez MD;  Location:  GI    ESOPHAGOSCOPY, GASTROSCOPY, DUODENOSCOPY (EGD), COMBINED N/A 10/10/2024    Procedure: Esophagoscopy, gastroscopy, duodenoscopy (EGD), combined;  Surgeon: Nico Bhatti DO;  Location: [facility] GI    HC EXPLORE UNDESC TESTIS,INGUIN/SCROTAL      LAPAROSCOPIC APPENDECTOMY N/A 01/31/2018    Procedure: LAPAROSCOPIC APPENDECTOMY;  LAPAROSCOPIC APPENDECTOMY;  Surgeon: Dawn Holt MD;  [facility]: [facility]    LAPAROSCOPY DIAGNOSTIC (GENERAL) N/A 07/26/2016    Procedure: LAPAROSCOPY DIAGNOSTIC (GENERAL);  Surgeon: Susannah Arriaga MD;  [facility]: [facility]    LAPAROSCOPY DIAGNOSTIC (GENERAL) N/A 04/16/2018    Procedure: LAPAROSCOPY DIAGNOSTIC (GENERAL);  Diagnostic laparoscopy and lysis of adhesions;  Surgeon: Prince Dowling MD;  Location: [facility]    OPTICAL TRACKING SYSTEM CRANIOTOMY, EXCISE TUMOR, COMBINED Left 04/10/2015    Procedure: COMBINED OPTICAL TRACKING  SYSTEM CRANIOTOMY, EXCISE TUMOR;  Surgeon: Mirlande Colmenares MD;  Location: [facility]    PICC INSERTION - TRIPLE LUMEN Right 12/26/2024    Right brachial medial vein 42cm total 4cm external.    REPAIR GAMEKEEPER'S THUMB Right 12/02/2016    Procedure: REPAIR LIGAMENT ULNAR COLLATERAL THUMB (GAMEKEEPER'S);  Surgeon: Evin Zamorano MD;  [facility]    RESECT SMALL BOWEL WITHOUT OSTOMY N/A 12/20/2024    Procedure: Laparotomy exploratory with small bowel resection.;  Surgeon: Christiano Obrien MD;  Location: [facility]    RESECT SMALL BOWEL WITHOUT OSTOMY N/A 12/24/2024    Procedure: EXPLORATORY LAPAROTOMY, SMALL BOWEL RESECTION AND REANASTOMOSIS;  Surgeon: Shabnam Kate MD;  Location: [facility] OR    ZZC NONSPECIFIC PROCEDURE      right forearm fracture     Social History     Socioeconomic History    Marital status: Single   Occupational History    Occupation:      Comment: 5 years; temp agency    Occupation: Unemployed   Tobacco Use    Smoking status: Some Days     Current packs/day: 0.25     Average packs/day: 0.3 packs/day for 40.0 years (10.0 ttl pk-yrs)     Types: Cigarettes    Smokeless tobacco: Former   Substance and Sexual Activity    Alcohol use: No     Alcohol/week: 0.0 standard drinks of alcohol     Comment: Last etoh in 2007    Drug use: Not Currently     Types: Marijuana     Comment: chart indicated meth use but pt denies; states he does not use drugs    Sexual activity: Not Currently     Partners: Female, Male     Comment: Last sexual activity 2008   Social History Narrative    Born in [facility].  [place]ame to the [place] in 1993.  Last traveled to visit family in 2008.        1/7/2019 - Homeless. Working with a  at [facility] trying to get housing. Sexually active with two partners recently using condoms 100% of the time. Smokes occasionally, not for the last 4 weeks.        1/7/2020 at [facility] since 1/1/2020. 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  Care established with ID and  endocrine     Social Drivers of Health     Financial Resource Strain: High Risk (1/27/2025)    Financial Resource Strain     Within the past 12 months, have you or your family members you live with been unable to get utilities (heat, electricity) when it was really needed?: Yes   Food Insecurity: High Risk (1/27/2025)    Food Insecurity     Within the past 12 months, did you worry that your food would run out before you got money to buy more?: Yes     Within the past 12 months, did the food you bought just not last and you didn t have money to get more?: Yes   Transportation Needs: High Risk (1/27/2025)    Transportation Needs     Within the past 12 months, has lack of transportation kept you from medical appointments, getting your medicines, non-medical meetings or appointments, work, or from getting things that you need?: Yes    [facility]    Social Connections   Interpersonal Safety: Low Risk  (1/27/2025)    Interpersonal Safety     Do you feel physically and emotionally safe where you currently live?: Yes     Within the past 12 months, have you been hit, slapped, kicked or otherwise physically hurt by someone?: No     Within the past 12 months, have you been humiliated or emotionally abused in other ways by your partner or ex-partner?: No   Housing Stability: High Risk (1/27/2025)    Housing Stability     Do you have housing? : No     Are you worried about losing your housing?: Yes      Family History   Problem Relation Age of Onset    Diabetes Brother     Diabetes Father     Alzheimer Disease Father     Unknown/Adopted Mother     Diabetes Paternal Grandfather     Cancer No family hx of         no skin cancer    Skin Cancer No family hx of         no famiy hx of skin cancer    Glaucoma No family hx of     Macular Degeneration No family hx of      Medications Prior to Admission   Medication Sig Dispense Refill Last  Dose/Taking    bictegravir-emtricitabine-tenofovir (BIKTARVY) -25 MG per tablet Take 1 tablet by mouth daily. 30 tablet 0 2025    insulin aspart (NOVOLOG PEN) 100 UNIT/ML pen Inject 10 Units subcutaneously 3 times daily (with meals). Take only when you have access to a meal. 15 mL 3 Unknown    insulin glargine (LANTUS PEN) 100 UNIT/ML pen Inject 25 Units subcutaneously every morning. REDUCE TO 15 UNITS IF YOU THINK YOU WILL NOT HAVE FOOD THAT DAY. 15 mL 3 Unknown    blood glucose (NO BRAND SPECIFIED) lancets standard Use to test blood sugar 1 time daily or as directed. 100 Lancet 4     blood glucose (NO BRAND SPECIFIED) test strip Use to test blood sugar 1 time daily or as directed. 100 strip 4     blood glucose monitoring (NO BRAND SPECIFIED) meter device kit Use to test blood sugar 3 times daily or as directed. 1 kit 0     Continuous Glucose Sensor (DEXCOM G6 SENSOR) MISC 1 each every 10 days Change every 10 days. 3 each 11     Continuous Glucose Sensor (DEXCOM G7 SENSOR) MISC 1 Device every 10 days. 9 each 3     Continuous Glucose Transmitter (DEXCOM G6 TRANSMITTER) MISC 1 each every 3 months Change every 3 months. 1 each 3     insulin pen needle (32G X 4 MM) 32G X 4 MM miscellaneous Use 4-5 pen needles daily or as directed. 100 each 2     naproxen (NAPROSYN) 500 MG tablet Take 1 tablet (500 mg) by mouth 2 times daily (with meals). (Patient not taking: Reported on 2025) 20 tablet 0 Not Taking    Nutritional Supplements (ENSURE ACTIVE) LIQD Take 414 mLs by mouth daily (Patient not taking: Reported on 2025) 30 mL 11 Not Taking    [] oxyCODONE (ROXICODONE) 5 MG tablet Take 1 tablet (5 mg) by mouth or Feeding Tube every 8 hours as needed for moderate to severe pain. (Patient not taking: Reported on 2025) 12 tablet 0 Not Taking    pantoprazole (PROTONIX) 40 MG EC tablet Take 1 tablet (40 mg) by mouth 2 times daily (before meals). (Patient not taking: Reported on 2025) 60 tablet 0  Not Taking     Current Facility-Administered Medications   Medication Dose Route Frequency Provider Last Rate Last Admin    acetaminophen (TYLENOL) tablet 650 mg  650 mg Oral Q4H PRN Javier Schneider APRN CNP   650 mg at 01/28/25 2208    Or    acetaminophen (TYLENOL) Suppository 650 mg  650 mg Rectal Q4H PRN Javier Schneider APRN CNP        aspirin (ASA) chewable tablet 81 mg  81 mg Oral Daily Shruti Gordon MD   81 mg at 01/31/25 0904    atorvastatin (LIPITOR) tablet 40 mg  40 mg Oral QPM Shruti Gordon MD   40 mg at 01/30/25 2138    bictegravir-emtricitabine-tenofovir (BIKTARVY) -25 MG per tablet 1 tablet  1 tablet Oral Daily [employer] PA-C   1 tablet at 01/31/25 0904    bisacodyl (DULCOLAX) EC tablet 5 mg  5 mg Oral Daily PRN Javier Schneider APRN CNP        Or    bisacodyl (DULCOLAX) EC tablet 10 mg  10 mg Oral Daily PRN Javier Schneider APRN CNP        bisacodyl (DULCOLAX) suppository 10 mg  10 mg Rectal Daily PRN Carrie Seay MD   10 mg at 01/31/25 0250    calcium carbonate (TUMS) chewable tablet 1,000 mg  1,000 mg Oral 4x Daily PRN JennieJavier jones APRN CNP   1,000 mg at 01/30/25 0414    Continuing beta blocker from home medication list OR beta blocker order already placed during this visit   Does not apply DOES NOT GO TO Hernando Hinds MD        Continuing statin from home medication list OR statin order already placed during this visit   Does not apply DOES NOT GO TO Hernando Hinds MD        glucose gel 15-30 g  15-30 g Oral Q15 Min Carrie Mast MD        Or    dextrose 50 % injection 25-50 mL  25-50 mL Intravenous Q15 Min Carrie Mast MD        Or    glucagon injection 1 mg  1 mg Subcutaneous Q15 Min Carrie Mast MD        HOLD: Metformin and metformin containing medications on day of procedure and 48 hours after IV contrast given for patients with acute kidney injury or severe chronic kidney  disease (stage IV or stage V; i.e., eGFR less than 30)   Does not apply HOLD Hernando Leonard MD        HYDROmorphone (PF) (DILAUDID) injection 0.5 mg  0.5 mg Intravenous Q4H PRN Carrie Seay MD   0.5 mg at 01/31/25 0909    insulin aspart (NovoLOG) injection (RAPID ACTING)  1-3 Units Subcutaneous TID AC Carrie Seay MD   1 Units at 01/31/25 0903    insulin aspart (NovoLOG) injection (RAPID ACTING)  1-3 Units Subcutaneous At Bedtime Carrie Seay MD   1 Units at 01/29/25 2145    insulin glargine (LANTUS PEN) injection 15 Units  15 Units Subcutaneous QAM Javier Schneider APRN CNP   15 Units at 01/31/25 0903    lidocaine (LMX4) cream   Topical Q1H PRN Javier Schneider APRN CNP        lidocaine 1 % 0.1-1 mL  0.1-1 mL Other Q1H PRN Javier Schneider APRN CNP        magnesium hydroxide (MILK OF MAGNESIA) suspension 30 mL  30 mL Oral Daily Carrie Seay MD   30 mL at 01/30/25 1252    melatonin tablet 5 mg  5 mg Oral At Bedtime PRN Javier Schneider APRN CNP        metoprolol tartrate (LOPRESSOR) half-tab 12.5 mg  12.5 mg Oral BID Shruti Gordon MD   12.5 mg at 01/31/25 0904    naloxone (NARCAN) injection 0.2 mg  0.2 mg Intravenous Q2 Min PRN Cj Hoyos MD        Or    naloxone (NARCAN) injection 0.4 mg  0.4 mg Intravenous Q2 Min PRN Cj Hoyos MD        Or    naloxone (NARCAN) injection 0.2 mg  0.2 mg Intramuscular Q2 Min PRN Cj Hoyos MD        Or    naloxone (NARCAN) injection 0.4 mg  0.4 mg Intramuscular Q2 Min PRN Cj Hoyos MD        nitroGLYcerin (NITROSTAT) sublingual tablet 0.4 mg  0.4 mg Sublingual Q5 Min PRN Shruti Gordon MD   0.4 mg at 01/29/25 0248    ondansetron (ZOFRAN ODT) ODT tab 4 mg  4 mg Oral Q6H PRN Javier Schneider APRN CNP        Or    ondansetron (ZOFRAN) injection 4 mg  4 mg Intravenous Q6H PRN Javier Schneider APRN CNP        oxyCODONE (ROXICODONE) tablet 5 mg  5  "mg Oral Q4H PRN Carrie Seay MD   5 mg at 01/30/25 2208    pantoprazole (PROTONIX) EC tablet 40 mg  40 mg Oral BID AC Shruti Gordon MD   40 mg at 01/31/25 0904    Percutaneous Coronary Intervention orders placed (this is information for BPA alerting)   Does not apply DOES NOT GO TO Hernando Hinds MD        polyethylene glycol (MIRALAX) Packet 17 g  17 g Oral BID Carrie Seay MD   17 g at 01/31/25 0903    polyethylene glycol (MIRALAX) Packet 17 g  17 g Oral BID PRN Javier Schneider APRN CNP        prasugrel (EFFIENT) tablet 10 mg  10 mg Oral Daily Hernando Leonard MD   10 mg at 01/31/25 0908    reason aspirin not prescribed (intentional)   Other DOES NOT GO TO Hernando Hinds MD        sennosides (SENOKOT) tablet 8.6 mg  8.6 mg Oral BID Carrie Seay MD   8.6 mg at 01/31/25 0904    simethicone (MYLICON) chewable tablet 160 mg  160 mg Oral TID Roro Gomes CNP   160 mg at 01/31/25 0904    sodium chloride (PF) 0.9% PF flush 3 mL  3 mL Intracatheter Q8H Javier Schneider APRN CNP   3 mL at 01/31/25 0908    sodium chloride (PF) 0.9% PF flush 3 mL  3 mL Intracatheter q1 min prn Javier Schneider APRN CNP           Objective   Physical Exam:    Blood pressure 119/80, pulse 93, temperature 97.7  F (36.5  C), temperature source Oral, resp. rate 18, height 1.626 m (5' 4\"), weight 65.7 kg (144 lb 12.8 oz), SpO2 98%.    Exam:  General: NAD  HEENT: no scleral icterus or injection  CARDIAC: RRR, no murmurs. No JVD  RESP:  CTAB  GI: nondistended  EXTREMITIES: no LE edema, right groin access site intact without evidence of hematoma, distal pulses WNL  NEURO: No focal deficits    Labs & Studies: I personally reviewed the following studies:  CMP  Recent Labs   Lab 01/31/25  0902 01/31/25  0758 01/30/25  2220 01/30/25  2032 01/30/25  1357 01/30/25  0800 01/30/25  0722 01/29/25  1021 01/29/25  0547 01/28/25  0824 01/28/25  0713 01/25/25  0019 01/24/25  1237   NA  --  " 131*  --   --   --   --  134*  --  130*  --  130*   < > 133*   POTASSIUM  --  3.8  --   --   --   --  3.8  --  3.8  --  3.9   < > 4.2   CHLORIDE  --  100  --   --   --   --  100  --  96*  --  96*   < > 98   CO2  --  21*  --   --   --   --  25  --  24  --  25   < > 23   ANIONGAP  --  10  --   --   --   --  9  --  10  --  9   < > 12   * 182* 91 92  --    < > 134*   < > 221*   < > 169*   < > 143*   BUN  --  12.3  --   --   --   --  17.7  --  14.5  --  14.6   < > 12.9   CR  --  0.70  --   --   --   --  0.73  --  0.58*  --  0.67   < > 0.61*   GFRESTIMATED  --  >90  --   --   --   --  >90  --  >90  --  >90   < > >90   LINDA  --  8.5*  --   --   --   --  9.4  --  9.5  --  9.1   < > 8.7*   MAG  --  1.7  --   --  2.3  --  1.5*  --  1.7  --  1.6*   < >  --    PHOS  --  3.5  --   --   --   --   --   --  3.4  --   --   --   --    PROTTOTAL  --   --   --   --   --   --   --   --   --   --   --   --  7.5   ALBUMIN  --   --   --   --   --   --   --   --   --   --   --   --  3.1*   BILITOTAL  --   --   --   --   --   --   --   --   --   --   --   --  0.3   ALKPHOS  --   --   --   --   --   --   --   --   --   --   --   --  298*   AST  --   --   --   --   --   --   --   --   --   --   --   --  31   ALT  --   --   --   --   --   --   --   --   --   --   --   --  26    < > = values in this interval not displayed.     CBC  Recent Labs   Lab 01/31/25  0758 01/30/25  0722 01/29/25  0700 01/29/25  0547   WBC 7.5 7.9 9.1 9.8   RBC 3.09* 3.49* 3.50* 3.57*   HGB 8.1* 9.1* 9.3* 9.5*   HCT 25.3* 28.9* 27.9* 28.2*   MCV 82 83 80 79   MCH 26.2* 26.1* 26.6 26.6   MCHC 32.0 31.5 33.3 33.7   RDW 15.1* 14.7 14.6 14.6   * 495* 515* 535*     INRNo lab results found in last 7 days.         No data to display                  No results found for this or any previous visit (from the past 4320 hours).    Imaging:  Reviewed    I saw and evaluated patient with CV fellow. I examined patient with CV fellow. I discussed the results with patient  and CV fellow. I discussed our plan with patient and CV fellow.  I agree with CV fellow's note and I edited the CV fellow's note to make it a more comprehensive document.  During the consult, I spend 45 min directly with patient and CV fellow.    Lam Nugent MD, PhD  Professor of Medicine  Division of Cardiology

## 2025-02-01 LAB
ANION GAP SERPL CALCULATED.3IONS-SCNC: 8 MMOL/L (ref 7–15)
BUN SERPL-MCNC: 16.4 MG/DL (ref 8–23)
CALCIUM SERPL-MCNC: 8.5 MG/DL (ref 8.8–10.4)
CHLORIDE SERPL-SCNC: 102 MMOL/L (ref 98–107)
CREAT SERPL-MCNC: 0.78 MG/DL (ref 0.67–1.17)
EGFRCR SERPLBLD CKD-EPI 2021: >90 ML/MIN/1.73M2
ERYTHROCYTE [DISTWIDTH] IN BLOOD BY AUTOMATED COUNT: 14.9 % (ref 10–15)
GLUCOSE BLDC GLUCOMTR-MCNC: 102 MG/DL (ref 70–99)
GLUCOSE BLDC GLUCOMTR-MCNC: 124 MG/DL (ref 70–99)
GLUCOSE BLDC GLUCOMTR-MCNC: 125 MG/DL (ref 70–99)
GLUCOSE BLDC GLUCOMTR-MCNC: 136 MG/DL (ref 70–99)
GLUCOSE SERPL-MCNC: 164 MG/DL (ref 70–99)
HCO3 SERPL-SCNC: 23 MMOL/L (ref 22–29)
HCT VFR BLD AUTO: 24 % (ref 40–53)
HGB BLD-MCNC: 7.8 G/DL (ref 13.3–17.7)
MCH RBC QN AUTO: 26.4 PG (ref 26.5–33)
MCHC RBC AUTO-ENTMCNC: 32.5 G/DL (ref 31.5–36.5)
MCV RBC AUTO: 81 FL (ref 78–100)
PLATELET # BLD AUTO: 420 10E3/UL (ref 150–450)
POTASSIUM SERPL-SCNC: 3.9 MMOL/L (ref 3.4–5.3)
RBC # BLD AUTO: 2.96 10E6/UL (ref 4.4–5.9)
SODIUM SERPL-SCNC: 133 MMOL/L (ref 135–145)
WBC # BLD AUTO: 5.8 10E3/UL (ref 4–11)

## 2025-02-01 PROCEDURE — 120N000005 HC R&B MS OVERFLOW UMMC

## 2025-02-01 PROCEDURE — 250N000013 HC RX MED GY IP 250 OP 250 PS 637: Performed by: STUDENT IN AN ORGANIZED HEALTH CARE EDUCATION/TRAINING PROGRAM

## 2025-02-01 PROCEDURE — 250N000013 HC RX MED GY IP 250 OP 250 PS 637: Performed by: INTERNAL MEDICINE

## 2025-02-01 PROCEDURE — 85014 HEMATOCRIT: CPT

## 2025-02-01 PROCEDURE — 85041 AUTOMATED RBC COUNT: CPT

## 2025-02-01 PROCEDURE — 99207 PR APP CREDIT; MD BILLING SHARED VISIT: CPT | Mod: FS | Performed by: NURSE PRACTITIONER

## 2025-02-01 PROCEDURE — 80048 BASIC METABOLIC PNL TOTAL CA: CPT

## 2025-02-01 PROCEDURE — 250N000013 HC RX MED GY IP 250 OP 250 PS 637: Performed by: NURSE PRACTITIONER

## 2025-02-01 PROCEDURE — 999N000248 HC STATISTIC IV INSERT WITH US BY RN

## 2025-02-01 PROCEDURE — 250N000013 HC RX MED GY IP 250 OP 250 PS 637: Performed by: PHYSICIAN ASSISTANT

## 2025-02-01 PROCEDURE — 250N000011 HC RX IP 250 OP 636: Performed by: STUDENT IN AN ORGANIZED HEALTH CARE EDUCATION/TRAINING PROGRAM

## 2025-02-01 PROCEDURE — 36415 COLL VENOUS BLD VENIPUNCTURE: CPT

## 2025-02-01 RX ADMIN — HYDROMORPHONE HYDROCHLORIDE 0.5 MG: 1 INJECTION, SOLUTION INTRAMUSCULAR; INTRAVENOUS; SUBCUTANEOUS at 12:44

## 2025-02-01 RX ADMIN — HYDROMORPHONE HYDROCHLORIDE 0.5 MG: 1 INJECTION, SOLUTION INTRAMUSCULAR; INTRAVENOUS; SUBCUTANEOUS at 16:28

## 2025-02-01 RX ADMIN — MAGNESIUM HYDROXIDE 30 ML: 400 SUSPENSION ORAL at 13:36

## 2025-02-01 RX ADMIN — OXYCODONE HYDROCHLORIDE 5 MG: 5 TABLET ORAL at 02:29

## 2025-02-01 RX ADMIN — OXYCODONE HYDROCHLORIDE 5 MG: 5 TABLET ORAL at 10:33

## 2025-02-01 RX ADMIN — OXYCODONE HYDROCHLORIDE 5 MG: 5 TABLET ORAL at 15:38

## 2025-02-01 RX ADMIN — OXYCODONE HYDROCHLORIDE 5 MG: 5 TABLET ORAL at 19:38

## 2025-02-01 RX ADMIN — ACETAMINOPHEN 650 MG: 325 TABLET, FILM COATED ORAL at 23:37

## 2025-02-01 RX ADMIN — SIMETHICONE CHEW TAB 80 MG 160 MG: 80 TABLET ORAL at 08:13

## 2025-02-01 RX ADMIN — HYDROMORPHONE HYDROCHLORIDE 0.5 MG: 1 INJECTION, SOLUTION INTRAMUSCULAR; INTRAVENOUS; SUBCUTANEOUS at 20:34

## 2025-02-01 RX ADMIN — HYDROMORPHONE HYDROCHLORIDE 0.5 MG: 1 INJECTION, SOLUTION INTRAMUSCULAR; INTRAVENOUS; SUBCUTANEOUS at 03:59

## 2025-02-01 RX ADMIN — SIMETHICONE CHEW TAB 80 MG 160 MG: 80 TABLET ORAL at 19:37

## 2025-02-01 RX ADMIN — SIMETHICONE CHEW TAB 80 MG 160 MG: 80 TABLET ORAL at 13:36

## 2025-02-01 RX ADMIN — POLYETHYLENE GLYCOL 3350 17 G: 17 POWDER, FOR SOLUTION ORAL at 19:40

## 2025-02-01 RX ADMIN — PANTOPRAZOLE SODIUM 40 MG: 40 TABLET, DELAYED RELEASE ORAL at 16:28

## 2025-02-01 RX ADMIN — Medication 12.5 MG: at 19:37

## 2025-02-01 RX ADMIN — ASPIRIN 81 MG CHEWABLE TABLET 81 MG: 81 TABLET CHEWABLE at 08:13

## 2025-02-01 RX ADMIN — SENNOSIDES 8.6 MG: 8.6 TABLET ORAL at 19:37

## 2025-02-01 RX ADMIN — INSULIN GLARGINE 15 UNITS: 100 INJECTION, SOLUTION SUBCUTANEOUS at 10:45

## 2025-02-01 RX ADMIN — PANTOPRAZOLE SODIUM 40 MG: 40 TABLET, DELAYED RELEASE ORAL at 08:13

## 2025-02-01 RX ADMIN — POLYETHYLENE GLYCOL 3350 17 G: 17 POWDER, FOR SOLUTION ORAL at 08:13

## 2025-02-01 RX ADMIN — PRASUGREL 10 MG: 10 TABLET, FILM COATED ORAL at 10:45

## 2025-02-01 RX ADMIN — OXYCODONE HYDROCHLORIDE 5 MG: 5 TABLET ORAL at 23:37

## 2025-02-01 RX ADMIN — OXYCODONE HYDROCHLORIDE 5 MG: 5 TABLET ORAL at 06:31

## 2025-02-01 RX ADMIN — ATORVASTATIN CALCIUM 40 MG: 40 TABLET, FILM COATED ORAL at 19:38

## 2025-02-01 RX ADMIN — Medication 12.5 MG: at 08:13

## 2025-02-01 RX ADMIN — HYDROMORPHONE HYDROCHLORIDE 0.5 MG: 1 INJECTION, SOLUTION INTRAMUSCULAR; INTRAVENOUS; SUBCUTANEOUS at 08:13

## 2025-02-01 RX ADMIN — ACETAMINOPHEN 650 MG: 325 TABLET, FILM COATED ORAL at 01:43

## 2025-02-01 RX ADMIN — SENNOSIDES 8.6 MG: 8.6 TABLET ORAL at 08:13

## 2025-02-01 RX ADMIN — ACETAMINOPHEN 650 MG: 325 TABLET, FILM COATED ORAL at 18:08

## 2025-02-01 RX ADMIN — BICTEGRAVIR SODIUM, EMTRICITABINE, AND TENOFOVIR ALAFENAMIDE FUMARATE 1 TABLET: 50; 200; 25 TABLET ORAL at 10:45

## 2025-02-01 ASSESSMENT — ACTIVITIES OF DAILY LIVING (ADL)
ADLS_ACUITY_SCORE: 36

## 2025-02-01 NOTE — PROGRESS NOTES
[facility]    Medicine Progress Note - Hospitalist Service, GOLD TEAM 6    Date of Admission:  1/24/2025    Assessment & Plan     Andrew Wolfe is a 59 year old male with past medical history significant for chronic abdominal pain, type 2 DM, HIV, PTSD, SBO s/p ex lap with small bowel resection (12/20/24) with return to OR on 12/24/24 for anastomotic leak, chronic housing instability admitted for recurrent small bowel obstruction.  Hospitalization further complicated by chest pain overnight 1/28-1/29 with EKG changes and elevated troponin concerning for ACS and type 1 NSTEMI.      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  EKG with non-specific ST abnormality in inferior leads and ST depression in inferiorly and uptrending troponin.  Cardiology consulted and recommended heparin gtt, ASA loading, statin, and beta blocker.  TTE with EF 45-50%, mildly decreased LV with apical hypokinesis, and mild aortic insufficiency.  Trop continued to rise throughout afternoon and evening 1/29.  Underwent coronary angio on 1/30 which showed three vessel CAD with severe stenosis of middle and distal LAD.  S/p PCI with SHELBY x 2.    - Cardiology following, recs as below:   GDMT:   - Statin: atorvastatin 40mg daily   - Beta blocker: metoprolol succinate 25mg daily   - ACE/ARB/ARNI: losartan 12.5mg daiy   - DAPT: ASA 81mg daily (indefinitely) and prasugrel 10mg daily (12 months)   - Monitor for recurrent chest pain  - Continue tele   - Monitor vitals closely     # Recurrent SBO, improving    # SBO s/p ex lap w/ small bowel resection on 12/20 with return to OR on 12/24 for anastomotic leak  # Acute pain 2/2 above  Readmitted with nausea, vomiting and recurrent SBO on CT scan. NG deferred due to lack of vomiting.  Having more regular BMs now.     - Can trial gastrograffin enema if needed   - General surgery signed  off  - Advanced to regular diet 1/26  - Continue scheduled simethicone 160mg TID   - Continue bowel regimen Miralax BID, Senna BID, MOM every day  - Oxycodone 4 mg every 4 hours prn, IV dilaudid 0.5 mg every 4 hours prn if unable to take oral or not responding to oral    # Hypomagnesemia   Mag improved.  - Replacement protocol ordered      # Chronic housing instability  Has most recently been staying at [facility].  They are agreeable for him to return there when medically ready.      # Type 2 DM   Last Hgb A1c 10% on 1/21/25.  On Lantus 25 PTA, dose decreased here due to NPO and below baseline PO intake.  Last 24 hr BG as follows:   Recent Labs   Lab 02/01/25  0657 01/31/25  2219 01/31/25  1128 01/31/25  0902 01/31/25  0758 01/30/25  2220   * 206* 136* 157* 182* 91   - Continue Lantus at decreased dose of 15 units daily for now   - Continue sliding scale insulin  - BG checks ac/hs   - Hypoglycemia protocol      # Abdominal wound 2/2 above   Healing by secondary intention.   - WOCN consult     # History of HIV  - Continue PTA Biktarvy       Diet: Regular Diet Adult    DVT Prophylaxis: Pneumatic Compression Devices  Potts Catheter: Not present  Lines: None     Cardiac Monitoring: None  Code Status: Full Code      Clinically Significant Risk Factors         # Hyponatremia: Lowest Na = 131 mmol/L in last 2 days, will monitor as appropriate       # Hypoalbuminemia: Lowest albumin = 3.1 g/dL at 1/24/2025 12:37 PM, will monitor as appropriate               # DMII: A1C = 10.0 % (Ref range: <5.7 %) within past 6 months         # Financial/Environmental Concerns: unemployed  # Housing Instability: noted in nursing assessment         Social Drivers of Health    Food Insecurity: High Risk (1/27/2025)    Food Insecurity     Within the past 12 months, did you worry that your food would run out before you got money to buy more?: Yes     Within the past 12 months, did the food you bought just not last and you didn t have  money to get more?: Yes   Housing Stability: High Risk (1/27/2025)    Housing Stability     Do you have housing? : No     Are you worried about losing your housing?: Yes   Tobacco Use: High Risk (1/21/2025)    Patient History     Smoking Tobacco Use: Some Days     Smokeless Tobacco Use: Former   Financial Resource Strain: High Risk (1/27/2025)    Financial Resource Strain     Within the past 12 months, have you or your family members you live with been unable to get utilities (heat, electricity) when it was really needed?: Yes   Alcohol Use: Unknown (8/27/2020)    AUDIT-C     Average Number of Drinks: Patient declined   Transportation Needs: High Risk (1/27/2025)    Transportation Needs     Within the past 12 months, has lack of transportation kept you from medical appointments, getting your medicines, non-medical meetings or appointments, work, or from getting things that you need?: Yes    Received from [facility], [facility] & [facility]    Social Connections          Disposition Plan     Medically Ready for Discharge: Ready Now, plan to discharge back to shelter on Monday when staff will be around (not available on weekends).       The patient's care was discussed with the Attending Physician, Dr. Mariza Seay .    Roro Gomes, [facility]  Hospitalist Service, [address]  Securely message with [employer] (more info)  Text page via [employer] Paging/Directory   See signed in provider for up to date coverage information  ______________________________________________________________________    Interval History   Andrew is resting in bed.  Doing better.  No chest pain.  Still having gas pain and feels like he isn't pooping enough.       Physical Exam   Vital Signs: Temp: 98.6  F (37  C) Temp src: Oral BP: 119/70 Pulse: 80   Resp: 16 SpO2: 97 % O2 Device: None (Room air)    Weight: 145 lbs 8 oz    GENERAL: Alert and  oriented x 3. Well nourished, well developed.  Petite body habitus.  No acute distress.    HEENT: Normocephalic, atraumatic. Anicteric sclera. Mucous membranes moist.   CV: RRR. S1, S2. No murmurs appreciated.   RESPIRATORY: Effort normal on room air. Lungs CTAB with no wheezing, rales, or rhonchi.   GI: Abdomen soft, non distended, bowel sounds present x all 4 quadrants.  Abdominal wound open and healing well, good granulation tissue.  No erythema or bleeding.    NEUROLOGICAL: No focal deficits. Follows commands.  Strength equal in upper and lower extremities.   MUSCULOSKELETAL: No joint swelling or tenderness. Moves all extremities.   EXTREMITIES: No gross deformities. No peripheral edema.   SKIN: Grossly warm, dry, and intact. No jaundice. No rashes.       Medical Decision Making       45 MINUTES SPENT BY ME on the date of service doing chart review, history, exam, documentation & further activities per the note.      Data     I have personally reviewed the following data over the past 24 hrs:    5.8  \   7.8 (L)   / 420     133 (L) 102 16.4 /  164 (H)   3.9 23 0.78 \       Imaging results reviewed over the past 24 hrs:   No results found for this or any previous visit (from the past 24 hours).

## 2025-02-01 NOTE — PLAN OF CARE
Shift: 4919-0038    Major Shift Events: Pt continues to report 10/10 abdominal pain, receiving prn oxycodone and prn dilaudid every four hours as available per orders. No BM since 1/29/25, hypo BS, pt reports passing gas. Pt showered this afternoon, abdominal dressing changed afterwards. Telemetry order discontinued, transfer orders written, no bed currently available to transfer to.     Plan: Contact [facility] 6 with any questions or concerns.  For vital signs and complete assessments, please see documentation flowsheets.      Radha Spears RN  Cardiology

## 2025-02-01 NOTE — PLAN OF CARE
[id number]    Major Shift Events: consistent 10/10 abdominal pain, managed with around the clock oxy and dilaudid. Pt no BM this shift & reports no passing of gas, encourage continued use of bowel meds and ambulation. No other acute changes this shift.  Plan: Contact [facility] 6 with any concerns.  For vital signs and complete assessments, please see documentation flowsheets.      -------------------------------------------------------------------    Goal Outcome Evaluation:      Plan of Care Reviewed With: patient    Overall Patient Progress: no change    Outcome Evaluation: around the clock pain management (IV and PO), bowel regimen continued, wound sites CDI/WNL

## 2025-02-01 NOTE — PLAN OF CARE
"Hours of care: 6352-7260     /75 (BP Location: Right arm, Cuff Size: Adult Regular)   Pulse 83   Temp 98.5  F (36.9  C) (Oral)   Resp 16   Ht 1.626 m (5' 4\")   Wt 65.7 kg (144 lb 12.8 oz)   SpO2 99%   BMI 24.85 kg/m        Neuro: A&O x4, denied  dizziness. Consistently disrespectful to staff. Getting incredibly upset if he doesn't receive his pain meds right when they are due or if they are not due when he wants them.   Respiratory:  Had clear, lung sounds diminished in the bases  Cardiac: S1, S2 heart sounds noted.  Sinus rhythm.   GI: Denied nausea, bowel sounds normoactive. Passing gas. Ate two sandwiches and some esau crackers.    : Had adeqaute urine output, see I&O flowsheet.  Skin: See PCS for assessment and treatment of wounds and surgical incisions.   Pain: Reported abd pain, was given oxy and dilaudid. Patient reported minimal relief of pain. Pt requesting pain meds round the clock when they are available. He does become upset if you are late with his pain meds.    Mobility: Ambulated in room, IND, steady on his feet.      Goal Outcome Evaluation:      Plan of Care Reviewed With: patient    Overall Patient Progress: no changeOverall Patient Progress: no change    Outcome Evaluation: round the clock pain management, dressing changed, new IV placed.      "

## 2025-02-02 LAB
ANION GAP SERPL CALCULATED.3IONS-SCNC: 10 MMOL/L (ref 7–15)
BUN SERPL-MCNC: 17.6 MG/DL (ref 8–23)
CALCIUM SERPL-MCNC: 8.8 MG/DL (ref 8.8–10.4)
CHLORIDE SERPL-SCNC: 101 MMOL/L (ref 98–107)
CREAT SERPL-MCNC: 0.83 MG/DL (ref 0.67–1.17)
EGFRCR SERPLBLD CKD-EPI 2021: >90 ML/MIN/1.73M2
ERYTHROCYTE [DISTWIDTH] IN BLOOD BY AUTOMATED COUNT: 15.1 % (ref 10–15)
GLUCOSE BLDC GLUCOMTR-MCNC: 113 MG/DL (ref 70–99)
GLUCOSE BLDC GLUCOMTR-MCNC: 126 MG/DL (ref 70–99)
GLUCOSE BLDC GLUCOMTR-MCNC: 146 MG/DL (ref 70–99)
GLUCOSE BLDC GLUCOMTR-MCNC: 196 MG/DL (ref 70–99)
GLUCOSE SERPL-MCNC: 94 MG/DL (ref 70–99)
HCO3 SERPL-SCNC: 22 MMOL/L (ref 22–29)
HCT VFR BLD AUTO: 23.7 % (ref 40–53)
HGB BLD-MCNC: 7.8 G/DL (ref 13.3–17.7)
MAGNESIUM SERPL-MCNC: 1.9 MG/DL (ref 1.7–2.3)
MCH RBC QN AUTO: 26.8 PG (ref 26.5–33)
MCHC RBC AUTO-ENTMCNC: 32.9 G/DL (ref 31.5–36.5)
MCV RBC AUTO: 81 FL (ref 78–100)
PLATELET # BLD AUTO: 391 10E3/UL (ref 150–450)
POTASSIUM SERPL-SCNC: 4 MMOL/L (ref 3.4–5.3)
RBC # BLD AUTO: 2.91 10E6/UL (ref 4.4–5.9)
SODIUM SERPL-SCNC: 133 MMOL/L (ref 135–145)
WBC # BLD AUTO: 6.9 10E3/UL (ref 4–11)

## 2025-02-02 PROCEDURE — 250N000013 HC RX MED GY IP 250 OP 250 PS 637: Performed by: STUDENT IN AN ORGANIZED HEALTH CARE EDUCATION/TRAINING PROGRAM

## 2025-02-02 PROCEDURE — 250N000011 HC RX IP 250 OP 636: Performed by: STUDENT IN AN ORGANIZED HEALTH CARE EDUCATION/TRAINING PROGRAM

## 2025-02-02 PROCEDURE — 250N000013 HC RX MED GY IP 250 OP 250 PS 637: Performed by: NURSE PRACTITIONER

## 2025-02-02 PROCEDURE — 85018 HEMOGLOBIN: CPT

## 2025-02-02 PROCEDURE — 85048 AUTOMATED LEUKOCYTE COUNT: CPT

## 2025-02-02 PROCEDURE — 36415 COLL VENOUS BLD VENIPUNCTURE: CPT

## 2025-02-02 PROCEDURE — 120N000005 HC R&B MS OVERFLOW UMMC

## 2025-02-02 PROCEDURE — 99232 SBSQ HOSP IP/OBS MODERATE 35: CPT | Mod: FS | Performed by: STUDENT IN AN ORGANIZED HEALTH CARE EDUCATION/TRAINING PROGRAM

## 2025-02-02 PROCEDURE — 84295 ASSAY OF SERUM SODIUM: CPT

## 2025-02-02 PROCEDURE — 99207 PR APP CREDIT; MD BILLING SHARED VISIT: CPT | Mod: FS | Performed by: NURSE PRACTITIONER

## 2025-02-02 PROCEDURE — 250N000013 HC RX MED GY IP 250 OP 250 PS 637: Performed by: INTERNAL MEDICINE

## 2025-02-02 PROCEDURE — 250N000013 HC RX MED GY IP 250 OP 250 PS 637: Performed by: PHYSICIAN ASSISTANT

## 2025-02-02 PROCEDURE — 80048 BASIC METABOLIC PNL TOTAL CA: CPT

## 2025-02-02 PROCEDURE — 83735 ASSAY OF MAGNESIUM: CPT | Performed by: STUDENT IN AN ORGANIZED HEALTH CARE EDUCATION/TRAINING PROGRAM

## 2025-02-02 RX ORDER — HYDROMORPHONE HYDROCHLORIDE 1 MG/ML
0.5 INJECTION, SOLUTION INTRAMUSCULAR; INTRAVENOUS; SUBCUTANEOUS EVERY 8 HOURS PRN
Status: DISCONTINUED | OUTPATIENT
Start: 2025-02-02 | End: 2025-02-03 | Stop reason: HOSPADM

## 2025-02-02 RX ORDER — BISACODYL 10 MG
10 SUPPOSITORY, RECTAL RECTAL DAILY
Status: DISCONTINUED | OUTPATIENT
Start: 2025-02-02 | End: 2025-02-03 | Stop reason: HOSPADM

## 2025-02-02 RX ADMIN — HYDROMORPHONE HYDROCHLORIDE 0.5 MG: 1 INJECTION, SOLUTION INTRAMUSCULAR; INTRAVENOUS; SUBCUTANEOUS at 04:42

## 2025-02-02 RX ADMIN — HYDROMORPHONE HYDROCHLORIDE 0.5 MG: 1 INJECTION, SOLUTION INTRAMUSCULAR; INTRAVENOUS; SUBCUTANEOUS at 23:40

## 2025-02-02 RX ADMIN — SIMETHICONE CHEW TAB 80 MG 160 MG: 80 TABLET ORAL at 14:06

## 2025-02-02 RX ADMIN — HYDROMORPHONE HYDROCHLORIDE 0.5 MG: 1 INJECTION, SOLUTION INTRAMUSCULAR; INTRAVENOUS; SUBCUTANEOUS at 00:39

## 2025-02-02 RX ADMIN — HYDROMORPHONE HYDROCHLORIDE 0.5 MG: 1 INJECTION, SOLUTION INTRAMUSCULAR; INTRAVENOUS; SUBCUTANEOUS at 09:11

## 2025-02-02 RX ADMIN — PANTOPRAZOLE SODIUM 40 MG: 40 TABLET, DELAYED RELEASE ORAL at 07:42

## 2025-02-02 RX ADMIN — INSULIN GLARGINE 15 UNITS: 100 INJECTION, SOLUTION SUBCUTANEOUS at 07:44

## 2025-02-02 RX ADMIN — BISACODYL 10 MG: 10 SUPPOSITORY RECTAL at 06:18

## 2025-02-02 RX ADMIN — HYDROMORPHONE HYDROCHLORIDE 0.5 MG: 1 INJECTION, SOLUTION INTRAMUSCULAR; INTRAVENOUS; SUBCUTANEOUS at 16:16

## 2025-02-02 RX ADMIN — SENNOSIDES 8.6 MG: 8.6 TABLET ORAL at 19:43

## 2025-02-02 RX ADMIN — SENNOSIDES 8.6 MG: 8.6 TABLET ORAL at 07:44

## 2025-02-02 RX ADMIN — ASPIRIN 81 MG CHEWABLE TABLET 81 MG: 81 TABLET CHEWABLE at 07:42

## 2025-02-02 RX ADMIN — ATORVASTATIN CALCIUM 40 MG: 40 TABLET, FILM COATED ORAL at 19:43

## 2025-02-02 RX ADMIN — MAGNESIUM HYDROXIDE 30 ML: 400 SUSPENSION ORAL at 14:08

## 2025-02-02 RX ADMIN — OXYCODONE HYDROCHLORIDE 5 MG: 5 TABLET ORAL at 19:42

## 2025-02-02 RX ADMIN — CALCIUM CARBONATE (ANTACID) CHEW TAB 500 MG 1000 MG: 500 CHEW TAB at 18:26

## 2025-02-02 RX ADMIN — BICTEGRAVIR SODIUM, EMTRICITABINE, AND TENOFOVIR ALAFENAMIDE FUMARATE 1 TABLET: 50; 200; 25 TABLET ORAL at 07:43

## 2025-02-02 RX ADMIN — OXYCODONE HYDROCHLORIDE 5 MG: 5 TABLET ORAL at 07:41

## 2025-02-02 RX ADMIN — Medication 12.5 MG: at 19:42

## 2025-02-02 RX ADMIN — PRASUGREL 10 MG: 10 TABLET, FILM COATED ORAL at 07:44

## 2025-02-02 RX ADMIN — POLYETHYLENE GLYCOL 3350 17 G: 17 POWDER, FOR SOLUTION ORAL at 07:45

## 2025-02-02 RX ADMIN — POLYETHYLENE GLYCOL 3350 17 G: 17 POWDER, FOR SOLUTION ORAL at 19:43

## 2025-02-02 RX ADMIN — OXYCODONE HYDROCHLORIDE 5 MG: 5 TABLET ORAL at 03:37

## 2025-02-02 RX ADMIN — Medication 12.5 MG: at 07:45

## 2025-02-02 RX ADMIN — SIMETHICONE CHEW TAB 80 MG 160 MG: 80 TABLET ORAL at 07:44

## 2025-02-02 RX ADMIN — SIMETHICONE CHEW TAB 80 MG 160 MG: 80 TABLET ORAL at 19:43

## 2025-02-02 RX ADMIN — PANTOPRAZOLE SODIUM 40 MG: 40 TABLET, DELAYED RELEASE ORAL at 16:16

## 2025-02-02 ASSESSMENT — ACTIVITIES OF DAILY LIVING (ADL)
ADLS_ACUITY_SCORE: 36

## 2025-02-02 NOTE — PROGRESS NOTES
General Appearance: VSS A+O cooperative.   Respiratory: Lungs CTA on room air. Some dyspnea wit exertion.   Cardiovascular: Not on tele. Pulse regular. VSS   GI: Patient did eat some today. Did have 2 large loose stools. One witnessed by RN  Skin: Dressing is CDI and due to changed tomorrow.   Other: Possible discharge tomorrow

## 2025-02-02 NOTE — PLAN OF CARE
Andrew Wolfe is a 59 year old male with past medical history significant for chronic abdominal pain, type 2 DM, HIV, PTSD, SBO s/p ex lap with small bowel resection (12/20/24) with return to OR on 12/24/24 for anastomotic leak, chronic housing instability admitted for recurrent small bowel obstruction. Hospitalization further complicated by chest pain overnight 1/28-1/29 with EKG changes and elevated troponin concerning for ACS and type 1 NSTEMI.   Shift 15:00-23:30  Neuro: A&O x 4. Restless.  Resp: Lungs CTA, sats 90s on RA.  CV: Off tele. VSS.  GI/: Eating fair. Voiding in the BR and flushing. On sliding scale insulin, BS checks.Abdominal dressing CDI, changed on days.  Mobility: Up ad lexi in the room and in the halls.  Pain: Taking oxycodone 5 mg po q 4 hours and dilaudid IV 0.5 q 4 hours. Always states pain is 8-9/10 when asked. Also received tylenol. States having constant aching.  Plan: To return to the shelter possibly on Monday. Assist as needed. [facility] 6 with issues.

## 2025-02-02 NOTE — DISCHARGE SUMMARY
[facility]  Hospitalist Discharge Summary      Date of Admission:  1/24/2025  Date of Discharge:  2/3/2025  Discharging Provider: Mahsa Khanna PA-C, Dr. 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  # Recurrent SBO, improving    # SBO s/p ex lap w/ small bowel resection on 12/20 with return to OR on 12/24 for anastomotic leak  # Acute pain 2/2 above  # Hypomagnesemia   # Chronic housing instability  # Type 2 DM   # Abdominal wound 2/2 above   # HIV       Clinically Significant Risk Factors     # DMII: A1C = 10.0 % (Ref range: <5.7 %) within past 6 months         Follow-ups Needed After Discharge   -- BMP in 1 week after initiation of ARB; routed to PCP  -- Cardiology follow up to be scheduled by cardiology team  -- Follow up with PCP regarding diabetes; likely will need to increase Lantus again with improving diet    Discharge Disposition   Discharged to [facility]   Condition at discharge: Stable    Hospital Course   Andrew Wolfe is a 59 year old male with past medical history significant for chronic abdominal pain, type 2 DM, HIV, PTSD, SBO s/p ex lap with small bowel resection (12/20/24) with return to OR on 12/24/24 for anastomotic leak, chronic housing instability admitted for recurrent small bowel obstruction. Hospitalization further complicated by chest pain overnight 1/28-1/29 with EKG changes and elevated troponin concerning for ACS and type 1 NSTEMI.     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  EKG with non-specific ST abnormality in inferior leads and ST depression in inferiorly and uptrending troponin.  Cardiology consulted and recommended heparin gtt, ASA loading, statin, and beta blocker.  TTE with EF 45-50%, mildly decreased LV with apical hypokinesis, and mild aortic  insufficiency.  Trop continued to rise throughout afternoon and evening 1/29.  Underwent coronary angio on 1/30 which showed three vessel CAD with severe stenosis of middle and distal LAD.  S/p PCI with SHELBY x 2.    - Cardiology following, recs as below:   GDMT:   - Statin: atorvastatin 40mg daily   - Beta blocker: metoprolol succinate 25mg daily   - ACE/ARB/ARNI: losartan 12.5mg daiy   - DAPT: ASA 81mg daily (indefinitely) and prasugrel 10mg daily (12 months)   - Follow up to be scheduled by cardiology team     # Recurrent SBO, improving    # SBO s/p ex lap w/ small bowel resection on 12/20 with return to OR on 12/24 for anastomotic leak  # Acute pain 2/2 above  Readmitted with nausea, vomiting and recurrent SBO on CT scan. NG deferred due to lack of vomiting.  Having more regular BMs now.     - General surgery signed off  - Advanced to regular diet 1/26  - Continue scheduled simethicone 160mg TID   - Continue bowel regimen Miralax BID, Senna BID, MOM every day  - Will be sent with short term bridge of oxycodone     # Type 2 DM   Last Hgb A1c 10% on 1/21/25.  On Lantus 25 PTA, dose decreased here due to NPO and below baseline PO intake.    - Continue Lantus at decreased dose of 15 units daily for now;  recommend follow up with PCP for consideration of increase back to prior dose with improved PO intake  - Dexcom and supplies ordered at discharge     # Abdominal wound 2/2 above   Healing by secondary intention.   - WOCN consult    # Chronic housing instability  Has most recently been staying at [facility].  They are agreeable for him to return there when medically ready.       # Hypomagnesemia   # History of HIV    Consultations This Hospital Stay   NURSING TO CONSULT FOR VASCULAR ACCESS CARE IP CONSULT  SURGERY GENERAL ADULT IP CONSULT  CARE MANAGEMENT / SOCIAL WORK IP CONSULT  INTERVENTIONAL RADIOLOGY ADULT/PEDS IP CONSULT  WOUND OSTOMY CONTINENCE NURSE  IP CONSULT  NURSING TO CONSULT FOR VIRTUAL CARE IP  CARE  MANAGEMENT / SOCIAL WORK IP CONSULT  NURSING TO CONSULT FOR VASCULAR ACCESS CARE IP CONSULT  CARDIOLOGY GENERAL ADULT IP CONSULT  PHARMACY IP CONSULT  PSYCHIATRY IP CONSULT  PHARMACY LIAISON FOR MEDICATION COVERAGE CONSULT  NURSING TO CONSULT FOR VASCULAR ACCESS CARE IP CONSULT  NURSING TO CONSULT FOR VASCULAR ACCESS CARE IP CONSULT    Code Status   Full Code    Time Spent on this Encounter   I, Mahsa Khanna PA-C, personally saw the patient today and spent greater than 30 minutes discharging this patient.       Mahsa Khanna PA-C  [facility] [address] [address]  Phone: [phone]  ______________________________________________________________________    Physical Exam   Vital Signs: Temp: 97.9  F (36.6  C) Temp src: Oral BP: 125/71 Pulse: 94   Resp: 16 SpO2: 99 % O2 Device: None (Room air)    Weight: 145 lbs 6.4 oz    Physical Exam  Vitals reviewed.   Constitutional:       General: He is not in acute distress.     Appearance: He is not diaphoretic.   Cardiovascular:      Rate and Rhythm: Normal rate.   Pulmonary:      Effort: Pulmonary effort is normal. No respiratory distress.   Abdominal:      Palpations: Abdomen is soft.   Skin:     General: Skin is warm and dry.   Neurological:      Mental Status: He is alert and oriented to person, place, and time.            Primary Care Physician   Rani Hernandez    Discharge Orders      Basic metabolic panel    Lab recheck after ARB initiation     Primary Care - Care Coordination Referral      Home Care Referral      Follow-Up with Cardiology      Reason aspirin not prescribed from this order set    Active order already exists     Reason Lipid Lowering Medications not prescribed from this order set    Already prescribed statin     Reason for your hospital stay    Dear Andrew,    You were admitted to [facility] from 1/24 to 2/3 with complaints of abdominal pain concerning for small bowel obstruction.  You were evaluated by our General  Surgery team and able to be managed without surgical intervention or gastric tube.  Additionally, you developed chest pain during this admission and found to have a type 1 NSTEMI (heart attack).  Our Cardiology team evaluated you and performed coronary angiography and placed 2 drug eluting stents.  You were started on ASA, prasugrel, atorvastatin, and metoprolol to manage your cardiac disease.  It is very important that you take your ASA and prasugrel every single day to prevent clot formation in your cardiac stents.           Please follow up with your PCP as currently scheduled with Celeste Boss PA-C on 2/5 at [facility].      Follow up with Infectious Disease clinic as scheduled with Dr. Benita Ivory on 3/13.      Follow up with Cardiology as directed.      It was a pleasure to care for you during your hospital stay.  Please let us know if there is anything else we can do for you so that we can be sure you are leaving completely satisfied with your care experience.      Take care,    Mahsa Khanna PA-C  [facility] - Internal Medicine   Hospitalist Service     Activity    Your activity upon discharge: activity as tolerated     Adult [facility] Follow-up and recommended labs and tests    Follow up with primary care provider, Rani Hernandez, within 7 days for hospital follow- up.  The following labs/tests are recommended: BMP, CBC, Mag, Phos.      Appointments on [place] and/or [facility] (with [facility] or [facility] provider or service). Call [phone] if you haven't heard regarding these appointments within 7 days of discharge.     When to contact your care team    Call your PCP or return to ED for temperatures > 100.7 degrees, worsening or changing pain, uncontrolled vomiting or inability to tolerate oral intake, new or worsening diarrhea, new or worsening shortness of breath, decreased urine output, yellowing of the eyes or skin, confusion, weakness, blood in urine or stools.     Diet     Follow this diet upon discharge: Regular Diet Adult       Significant Results and Procedures   Most Recent 3 CBC's:  Recent Labs   Lab Test 02/03/25  0556 02/02/25  0618 02/01/25  0657   WBC 7.2 6.9 5.8   HGB 7.6* 7.8* 7.8*   MCV 83 81 81    391 420     Most Recent 3 BMP's:  Recent Labs   Lab Test 02/03/25  0556 02/02/25  2030 02/02/25  1729 02/02/25  0840 02/02/25  0618 02/01/25  1319 02/01/25  0657   *  --   --   --  133*  --  133*   POTASSIUM 4.0  --   --   --  4.0  --  3.9   CHLORIDE 99  --   --   --  101  --  102   CO2 19*  --   --   --  22  --  23   BUN 13.9  --   --   --  17.6  --  16.4   CR 0.80  --   --   --  0.83  --  0.78   ANIONGAP 13  --   --   --  10  --  8   LINDA 8.6*  --   --   --  8.8  --  8.5*   * 196* 146*   < > 94   < > 164*    < > = values in this interval not displayed.     Most Recent 2 LFT's:  Recent Labs   Lab Test 01/24/25  1237 01/21/25  1241   AST 31 29   ALT 26 24   ALKPHOS 298* 364*   BILITOTAL 0.3 0.5   ,   Results for orders placed or performed during the hospital encounter of 01/24/25   CT Abdomen Pelvis w Contrast    Narrative    EXAM: CT ABDOMEN PELVIS W CONTRAST  LOCATION: [facility]  DATE: 1/24/2025    INDICATION: Recent SBO, multiple complicated surgeries, now with abdominal pain and nausea  COMPARISON: 12/30/24  TECHNIQUE: CT scan of the abdomen and pelvis was performed following injection of IV contrast. Multiplanar reformats were obtained. Dose reduction techniques were used.  CONTRAST: 71mL Isovue 370    FINDINGS:   LOWER CHEST: Small right pleural effusion. Scattered peripheral groundglass opacities in the lung bases. There is coronary artery disease.    HEPATOBILIARY: Cholelithiasis.    PANCREAS: Normal.    SPLEEN: Normal.    ADRENAL GLANDS: Normal.    KIDNEYS/BLADDER: Circumferential wall thickening of the bladder.    BOWEL: Normal stomach. A loop of small bowel in the midabdomen is distended with a maximum  diameter of approximately 4 cm. There is mottled stool in this location. This is in the region of a bowel suture line. More distally the small bowel loops are   normal in caliber.    LYMPH NODES: Normal.    VASCULATURE: Normal.    PELVIC ORGANS: Enlarged prostate gland. A penile prosthesis is present.    OTHER: Subcapsular fluid near the liver dome is 7.7 x 3.2 x 11.8 cm. A smaller collection along the inferior aspect of the liver is 1.1 x 3.9 x 3.2 cm. A small amount of fluid in the left abdomen is 4.0 x 1.2 x 2.3 cm.    MUSCULOSKELETAL: Midline ventral abdominal and pelvic wall surgical defect.      Impression    IMPRESSION:   1.  A small bowel obstruction at the suture line is more distended than on the prior study.  2.  Subcapsular fluid along the liver and a small collection in the left abdomen. The fluid along the liver is large enough to drain.  3.  Circumferential wall thickening of the bladder may be from underdistention or cystitis.     XR Gastrografin Challenge    Narrative    Exam: XR GASTROGRAFIN CHALLENGE, 1/29/2025 3:47 AM    Indication: Small Bowel Obstruction    Comparison: CT abdomen/pelvis 1/24/2025, abdominal radiograph on  12/20/2025    Findings:   Portable supine AP view of the abdomen was obtained. Gastrografin  contrast has passed through into the large bowel 8 hours after  administration. Contrast opacified loops of small bowel in the central  abdomen are mildly dilated, measuring up to 3.5 cm.      Impression    Impression: Contrast opacification of the colon, consistent with a  successful Gastrografin challenge. Persistent dilation of loops of  small bowel, suggestive of low-grade obstruction.    I have personally reviewed the examination and initial interpretation  and I agree with the findings.    BORIS PAYTON MD         SYSTEM ID:  [id number]   Echo Complete     Value    LVEF  45-50% (mildly reduced)    Narrative    [id number]  [id number]  [id number]  [id number]^ERIK^LILIANE^JOSE ALFREDO      [facility]  Echocardiography Laboratory  [address] [place] [address]     Name: BREE GARCIA  MRN: [id number]  : 1965  Study Date: 2025 08:33 AM  Age: 59 yrs  Gender: Male  Patient Location: [facility]  Reason For Study: Chest Pain  Ordering Physician: LILIANE VALENCIA  Performed By: Charbel Chen     BSA: 1.7 m2  Height: 64 in  Weight: 146 lb  BP: 116/71 mmHg  ______________________________________________________________________________  Procedure  Echocardiogram with two-dimensional, color and spectral Doppler.  ______________________________________________________________________________  Interpretation Summary  Left ventricular function is decreased. The ejection fraction is 45-50%  (mildly reduced). Left ventricular apical hypokinesis is present.  Global right ventricular function is normal.  Mild aortic insufficiency is present.  Pulmonary artery systolic pressure is normal.  Estimated mean right atrial pressure is normal.  No pericardial effusion is present.  ______________________________________________________________________________  Left Ventricle  Left ventricular size is normal. Left ventricular function is decreased. The  ejection fraction is 45-50% (mildly reduced). Left ventricular apical  hypokinesis is present.     Right Ventricle  The right ventricle is normal size. Global right ventricular function is  normal.     Atria  Both atria appear normal.     Mitral Valve  The mitral valve is normal.     Aortic Valve  Mild aortic valve calcification is present. Mild aortic insufficiency is  present.     Tricuspid Valve  The tricuspid valve is normal. Trace tricuspid insufficiency is present. The  right ventricular systolic pressure is approximated at 17.5 mmHg plus the  right atrial pressure. Pulmonary artery systolic pressure is normal.     Pulmonic Valve  Trace pulmonic insufficiency is present.     Vessels  The aorta  root is normal. The inferior vena cava was normal in size with  preserved respiratory variability. Estimated mean right atrial pressure is  normal.     Pericardium  No pericardial effusion is present.     Compared to Previous Study  There is no prior study for direct comparison.  ______________________________________________________________________________  MMode/2D Measurements & Calculations  IVSd: 0.88 cm  LVIDd: 4.5 cm  LVPWd: 0.98 cm  LV mass(C)d: 139.0 grams  LV mass(C)dI: 81.2 grams/m2  Ao root diam: 3.5 cm  asc Aorta Diam: 3.6 cm  LVOT diam: 2.1 cm  LVOT area: 3.4 cm2  Ao root diam index Ht(cm/m): 2.2  Ao root diam index BSA (cm/m2): 2.0  Asc Ao diam index BSA (cm/m2): 2.1  Asc Ao diam index Ht(cm/m): 2.2  EF Biplane: 51.2 %  LA Volume (BP): 36.0 ml     LA Volume Index (BP): 21.1 ml/m2  RWT: 0.43     Doppler Measurements & Calculations  MV E max heriberto: 36.5 cm/sec  MV A max heriberto: 64.9 cm/sec  MV E/A: 0.56  Ao V2 max: 103.6 cm/sec  Ao max P.3 mmHg  Ao V2 mean: 72.3 cm/sec  Ao mean P.4 mmHg  Ao V2 VTI: 17.0 cm  SIDNEY(I,D): 3.4 cm2  SIDNEY(V,D): 3.1 cm2  AI P1/2t: 491.0 msec  LV V1 max PG: 3.6 mmHg  LV V1 max: 94.8 cm/sec  LV V1 VTI: 17.0 cm  SV(LVOT): 57.4 ml  SI(LVOT): 33.5 ml/m2  TR max heriberto: 209.0 cm/sec  TR max P.5 mmHg  AV Hreiberto Ratio (DI): 0.92  SIDNEY Index (cm2/m2): 2.0  E/E' av.2     Lateral E/e': 5.1  Medial E/e': 7.3     ______________________________________________________________________________  Report approved by: Ashley Elizondo MD on 2025 09:23 AM         Cardiac Catheterization    Narrative      Three vessel coronary artery disease.    Threre were significant stenoses in the proximal and hul-fu-iobgoy LAD,   consistent with culprits of NSTEMI.    Successful IVUS-guided PCI of the proximal LAD and aqu-hd-ecujfy LAD   lesions: a 3.5 x 20 mm Synergy drug-eluting stent placed at   ycqcex-zv-qcuhcpou LAD, post-dilated with a 4.0mm NC balloon. A 2.5 x 20   mm Synergy SHELBY placed at  fss-sj-povemv LAD, post-dilated with a 2.5mm NC   balloon. Post-PCI IVUS confirmed good apposition and expansion of the   stents without edge dissection.    Hemostasis achieved with 8 Fr angioseal at right femoral artery access   and with TR band at right radial artery access.    Loaded with Prasugrel 60mg in the cath lab.    Continue Aspirin 81 mg po daily indefinitely and Prasugrel 10 mg po daily   for at least a year.  Continue medical management of the residual CAD.  Continue risk factor modification.       *Note: Due to a large number of results and/or encounters for the requested time period, some results have not been displayed. A complete set of results can be found in Results Review.       Discharge Medications   Current Discharge Medication List        START taking these medications    Details   aspirin (ASA) 81 MG chewable tablet Take 1 tablet (81 mg) by mouth daily.  Qty: 30 tablet, Refills: 11    Associated Diagnoses: NSTEMI (non-ST elevated myocardial infarction) (H)      atorvastatin (LIPITOR) 40 MG tablet Take 1 tablet (40 mg) by mouth every evening.  Qty: 30 tablet, Refills: 11    Associated Diagnoses: NSTEMI (non-ST elevated myocardial infarction) (H)      bisacodyl (DULCOLAX) 10 MG suppository Place 1 suppository (10 mg) rectally daily as needed for constipation.  Qty: 30 suppository, Refills: 2    Associated Diagnoses: Partial small bowel obstruction (H); Generalized abdominal pain      losartan (COZAAR) 25 MG tablet Take 0.5 tablets (12.5 mg) by mouth daily.  Qty: 15 tablet, Refills: 2    Associated Diagnoses: NSTEMI (non-ST elevated myocardial infarction) (H)      magnesium hydroxide (MILK OF MAGNESIA) 400 MG/5ML suspension Take 30 mLs by mouth daily.  Qty: 769 mL, Refills: 2    Associated Diagnoses: Partial small bowel obstruction (H); Generalized abdominal pain      metoprolol succinate ER (TOPROL XL) 25 MG 24 hr tablet Take 1 tablet (25 mg) by mouth daily.  Qty: 30 tablet, Refills: 2     Associated Diagnoses: NSTEMI (non-ST elevated myocardial infarction) (H)      polyethylene glycol (MIRALAX) 17 GM/Dose powder Take 17 g by mouth daily.  Qty: 510 g, Refills: 2    Associated Diagnoses: Partial small bowel obstruction (H); Generalized abdominal pain      prasugrel (EFFIENT) 10 MG TABS tablet Take 1 tablet (10 mg) by mouth daily.  Qty: 30 tablet, Refills: 11    Associated Diagnoses: NSTEMI (non-ST elevated myocardial infarction) (H)      sennosides (SENOKOT) 8.6 MG tablet Take 1-2 tablets by mouth 2 times daily.  Qty: 120 tablet, Refills: 2    Associated Diagnoses: Partial small bowel obstruction (H)      simethicone (MYLICON) 80 MG chewable tablet Take 2 tablets (160 mg) by mouth 3 times daily as needed for flatulence or cramping.  Qty: 180 tablet, Refills: 2    Associated Diagnoses: Partial small bowel obstruction (H)           CONTINUE these medications which have CHANGED    Details   bictegravir-emtricitabine-tenofovir (BIKTARVY) -25 MG per tablet Take 1 tablet by mouth daily.  Qty: 30 tablet, Refills: 2    Associated Diagnoses: Human immunodeficiency virus I infection (H)      insulin glargine (LANTUS PEN) 100 UNIT/ML pen Inject 15 Units subcutaneously every morning.  Qty: 15 mL, Refills: 1    Comments: If Lantus is not covered by insurance, may substitute Basaglar or Semglee or other insulin glargine product per insurance preference at same dose and frequency.    Associated Diagnoses: Hyperglycemia      oxyCODONE (ROXICODONE) 5 MG tablet Take 1 tablet (5 mg) by mouth every 6 hours as needed for severe pain.  Qty: 12 tablet, Refills: 0    Associated Diagnoses: SBO (small bowel obstruction) (H); Abdominal pain, generalized      pantoprazole (PROTONIX) 40 MG EC tablet Take 1 tablet (40 mg) by mouth 2 times daily (before meals).  Qty: 60 tablet, Refills: 2    Associated Diagnoses: Gastroesophageal reflux disease with esophagitis, unspecified whether hemorrhage           CONTINUE these  medications which have NOT CHANGED    Details   blood glucose (NO BRAND SPECIFIED) lancets standard Use to test blood sugar 1 time daily or as directed.  Qty: 100 Lancet, Refills: 4    Associated Diagnoses: Type 2 diabetes mellitus with hyperglycemia, with long-term current use of insulin (H)      blood glucose (NO BRAND SPECIFIED) test strip Use to test blood sugar 1 time daily or as directed.  Qty: 100 strip, Refills: 4    Associated Diagnoses: Type 2 diabetes mellitus with hyperglycemia, with long-term current use of insulin (H)      blood glucose monitoring (NO BRAND SPECIFIED) meter device kit Use to test blood sugar 3 times daily or as directed.  Qty: 1 kit, Refills: 0    Associated Diagnoses: Type 2 diabetes mellitus with hyperglycemia, with long-term current use of insulin (H)      !! Continuous Glucose Sensor (DEXCOM G6 SENSOR) MISC 1 each every 10 days Change every 10 days.  Qty: 3 each, Refills: 11    Associated Diagnoses: Type 2 diabetes mellitus with hyperglycemia, with long-term current use of insulin (H)      !! Continuous Glucose Sensor (DEXCOM G7 SENSOR) MISC 1 Device every 10 days.  Qty: 9 each, Refills: 3    Associated Diagnoses: Type 2 diabetes mellitus with hyperglycemia, with long-term current use of insulin (H)      Continuous Glucose Transmitter (DEXCOM G6 TRANSMITTER) MISC 1 each every 3 months Change every 3 months.  Qty: 1 each, Refills: 3    Associated Diagnoses: Type 2 diabetes mellitus with hyperglycemia, with long-term current use of insulin (H)      insulin pen needle (32G X 4 MM) 32G X 4 MM miscellaneous Use 4-5 pen needles daily or as directed.  Qty: 100 each, Refills: 2    Associated Diagnoses: Type 2 diabetes mellitus with hyperglycemia, with long-term current use of insulin (H)      Nutritional Supplements (ENSURE ACTIVE) LIQD Take 414 mLs by mouth daily  Qty: 30 mL, Refills: 11    Associated Diagnoses: Type 2 diabetes mellitus with hyperglycemia, with long-term current use of  "insulin (H); Human immunodeficiency virus I infection (H)       !! - Potential duplicate medications found. Please discuss with provider.        STOP taking these medications       insulin aspart (NOVOLOG PEN) 100 UNIT/ML pen Comments:   Reason for Stopping:         naproxen (NAPROSYN) 500 MG tablet Comments:   Reason for Stopping:             Allergies   Allergies   Allergen Reactions    Fentanyl Blisters     Per pt, after taking this medication had blisters develope    Tylenol [Acetaminophen] Itching     12/27/24 Patient has received acetaminophen multiple times since documentation of this reaction, in 2016 patient was given acetaminophen with codeine - more likely that codeine would cause itching    Dulaglutide Rash    Insulin Lispro Rash     Patient reported    Penicillin V Other (See Comments) and Rash     Diffuse maculopapular rash + feels \"high\", per pt.     Profile shows many uses of Zosyn      "

## 2025-02-02 NOTE — PROGRESS NOTES
[facility]    Medicine Progress Note - Hospitalist Service, GOLD TEAM 6    Date of Admission:  1/24/2025    Assessment & Plan     Andrew Wolfe is a 59 year old male with past medical history significant for chronic abdominal pain, type 2 DM, HIV, PTSD, SBO s/p ex lap with small bowel resection (12/20/24) with return to OR on 12/24/24 for anastomotic leak, chronic housing instability admitted for recurrent small bowel obstruction.  Hospitalization further complicated by chest pain overnight 1/28-1/29 with EKG changes and elevated troponin concerning for ACS and type 1 NSTEMI.      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  EKG with non-specific ST abnormality in inferior leads and ST depression in inferiorly and uptrending troponin.  Cardiology consulted and recommended heparin gtt, ASA loading, statin, and beta blocker.  TTE with EF 45-50%, mildly decreased LV with apical hypokinesis, and mild aortic insufficiency.  Trop continued to rise throughout afternoon and evening 1/29.  Underwent coronary angio on 1/30 which showed three vessel CAD with severe stenosis of middle and distal LAD.  S/p PCI with SHELBY x 2.    - Cardiology following, recs as below:   GDMT:   - Statin: atorvastatin 40mg daily   - Beta blocker: metoprolol succinate 25mg daily   - ACE/ARB/ARNI: losartan 12.5mg daiy   - DAPT: ASA 81mg daily (indefinitely) and prasugrel 10mg daily (12 months)   - Monitor for recurrent chest pain  - Continue tele   - Monitor vitals closely     # Recurrent SBO, improving    # SBO s/p ex lap w/ small bowel resection on 12/20 with return to OR on 12/24 for anastomotic leak  # Acute pain 2/2 above  Readmitted with nausea, vomiting and recurrent SBO on CT scan. NG deferred due to lack of vomiting.  Having more regular BMs now.     - General surgery signed off  - Advanced to regular diet 1/26  -  "Continue scheduled simethicone 160mg TID   - Continue bowel regimen Miralax BID, Senna BID, MOM every day  - Can trial gastrograffin enema if needed  - Oxycodone 4 mg every 4 hours prn    # Hypomagnesemia   Mag improved.  - Replacement protocol ordered      # Chronic housing instability  Has most recently been staying at [facility].  They are agreeable for him to return there when medically ready.      # Type 2 DM   Last Hgb A1c 10% on 1/21/25.  On Lantus 25 PTA, dose decreased here due to NPO and below baseline PO intake.  Last 24 hr BG as follows:   Recent Labs   Lab 02/02/25  1239 02/02/25  0840 02/02/25  0618 02/01/25  2206 02/01/25  1835 02/01/25  1804   * 126* 94 125* 136* 102*   - Continue Lantus at decreased dose of 15 units daily for now   - Continue sliding scale insulin  - BG checks ac/hs   - Hypoglycemia protocol      # Abdominal wound 2/2 above   Healing by secondary intention.   - WOCN consult     # History of HIV  - Continue PTA Biktarvy       Diet: Regular Diet Adult    DVT Prophylaxis: Pneumatic Compression Devices  Potts Catheter: Not present  Lines: None     Cardiac Monitoring: None  Code Status: Full Code      Clinically Significant Risk Factors         # Hyponatremia: Lowest Na = 133 mmol/L in last 2 days, will monitor as appropriate       # Hypoalbuminemia: Lowest albumin = 3.1 g/dL at 1/24/2025 12:37 PM, will monitor as appropriate               # DMII: A1C = 10.0 % (Ref range: <5.7 %) within past 6 months   # Overweight: Estimated body mass index is 25.35 kg/m  as calculated from the following:    Height as of this encounter: 1.626 m (5' 4\").    Weight as of this encounter: 67 kg (147 lb 11.2 oz).        # Financial/Environmental Concerns: unemployed  # Housing Instability: noted in nursing assessment         Social Drivers of Health    Food Insecurity: High Risk (1/27/2025)    Food Insecurity     Within the past 12 months, did you worry that your food would run out before you got " "money to buy more?: Yes     Within the past 12 months, did the food you bought just not last and you didn t have money to get more?: Yes   Housing Stability: High Risk (1/27/2025)    Housing Stability     Do you have housing? : No     Are you worried about losing your housing?: Yes   Tobacco Use: High Risk (1/21/2025)    Patient History     Smoking Tobacco Use: Some Days     Smokeless Tobacco Use: Former   Financial Resource Strain: High Risk (1/27/2025)    Financial Resource Strain     Within the past 12 months, have you or your family members you live with been unable to get utilities (heat, electricity) when it was really needed?: Yes   Alcohol Use: Unknown (8/27/2020)    AUDIT-C     Average Number of Drinks: Patient declined   Transportation Needs: High Risk (1/27/2025)    Transportation Needs     Within the past 12 months, has lack of transportation kept you from medical appointments, getting your medicines, non-medical meetings or appointments, work, or from getting things that you need?: Yes    [facility] [email or web address] [facility]    Social Connections          Disposition Plan     Medically Ready for Discharge: Ready Now, plan to discharge back to shelter on Monday when staff will be around (not available on weekends).       The patient's care was discussed with the Attending Physician, Dr. Mariza Seay .    Roro Gomes, [facility]  Hospitalist Service, [address]  Securely message with [facility] (more info)  Text page via [facility] Paging/Directory   See signed in provider for up to date coverage information  ______________________________________________________________________    Interval History   Andrew is resting in bed.  Doing better.  No chest pain.  Still having abdominal pain but having soft \"sand\" like stools this AM.  No vomiting.       Physical Exam   Vital Signs: Temp: 98.1  F (36.7 "  C) Temp src: Oral BP: 126/68 Pulse: 78   Resp: 18 SpO2: 99 % O2 Device: None (Room air)    Weight: 147 lbs 11.2 oz    GENERAL: Alert and oriented x 3. Well nourished, well developed.  Petite body habitus.  No acute distress.    HEENT: Normocephalic, atraumatic. Anicteric sclera. Mucous membranes moist.   CV: RRR. S1, S2. No murmurs appreciated.   RESPIRATORY: Effort normal on room air. Lungs CTAB with no wheezing, rales, or rhonchi.   GI: Abdomen soft, non distended, bowel sounds present x all 4 quadrants.  Abdominal wound bandaged.  No erythema or bleeding.    NEUROLOGICAL: No focal deficits. Follows commands.  Strength equal in upper and lower extremities.   MUSCULOSKELETAL: No joint swelling or tenderness. Moves all extremities.   EXTREMITIES: No gross deformities. No peripheral edema.   SKIN: Grossly warm, dry, and intact. No jaundice. No rashes.       Medical Decision Making       45 MINUTES SPENT BY ME on the date of service doing chart review, history, exam, documentation & further activities per the note.      Data     I have personally reviewed the following data over the past 24 hrs:    6.9  \   7.8 (L)   / 391     133 (L) 101 17.6 /  94   4.0 22 0.83 \       Imaging results reviewed over the past 24 hrs:   No results found for this or any previous visit (from the past 24 hours).

## 2025-02-02 NOTE — PLAN OF CARE
Goal Outcome Evaluation:      Plan of Care Reviewed With: patient    Overall Patient Progress: no changeOverall Patient Progress: no change    Outcome Evaluation: Patient requiring around the clock pain management with PRN Tylenol, oxycodone, and dilaudid q4hr. Patient reports no BM and no flatus. Bowel sounds active    Shift Report [id number]    NEURO: A&Ox4. Restless and agitated with nursing staff. Patient wandering around room most of night, declines PRN sleep medication, patient states he does not want to sleep. This RN attempted to question further regarding why he does not want to sleep/rest in bed, patient became agitated and told RN to leave room  CARDIAC:  No tele, VSS. Q8hr vital signs  RESP: No shortness of breath, on RA  GI: Around 0400 patient reported he had a large, hard BM. Patient flushed before RN could assess. Patient had previously been constipated since 1/29, not passing flatus. Bowel sounds active, good appetite, no N/V   : Voiding adequately   SKIN:  Abdominal incision dressing c/d/I. No new skin issues noted  LDA: R PIV saline locked  PAIN: Patient reports abdominal incisional/gas discomfort pain. Requiring around the clock PRN pain medications  PRN: Q4hr tylenol, oxycodone, and dilaudid. PRN suppository given   ACTIVITY: Up ad lexi in room      SHIFT UPDATES: Patient-reported BM, educated patient to notify staff with next BM before flushing so we can assess. Ongoing abd pain requiring PRN medications. Intermittently agitated  PLAN: discharge to [facility] on Monday

## 2025-02-03 VITALS
HEIGHT: 64 IN | TEMPERATURE: 97.9 F | WEIGHT: 145.4 LBS | OXYGEN SATURATION: 99 % | HEART RATE: 94 BPM | RESPIRATION RATE: 16 BRPM | DIASTOLIC BLOOD PRESSURE: 71 MMHG | SYSTOLIC BLOOD PRESSURE: 125 MMHG | BODY MASS INDEX: 24.82 KG/M2

## 2025-02-03 LAB
ANION GAP SERPL CALCULATED.3IONS-SCNC: 13 MMOL/L (ref 7–15)
BUN SERPL-MCNC: 13.9 MG/DL (ref 8–23)
CALCIUM SERPL-MCNC: 8.6 MG/DL (ref 8.8–10.4)
CHLORIDE SERPL-SCNC: 99 MMOL/L (ref 98–107)
CREAT SERPL-MCNC: 0.8 MG/DL (ref 0.67–1.17)
EGFRCR SERPLBLD CKD-EPI 2021: >90 ML/MIN/1.73M2
ERYTHROCYTE [DISTWIDTH] IN BLOOD BY AUTOMATED COUNT: 15 % (ref 10–15)
GLUCOSE BLDC GLUCOMTR-MCNC: 148 MG/DL (ref 70–99)
GLUCOSE SERPL-MCNC: 204 MG/DL (ref 70–99)
HCO3 SERPL-SCNC: 19 MMOL/L (ref 22–29)
HCT VFR BLD AUTO: 24 % (ref 40–53)
HGB BLD-MCNC: 7.6 G/DL (ref 13.3–17.7)
MAGNESIUM SERPL-MCNC: 1.7 MG/DL (ref 1.7–2.3)
MCH RBC QN AUTO: 26.3 PG (ref 26.5–33)
MCHC RBC AUTO-ENTMCNC: 31.7 G/DL (ref 31.5–36.5)
MCV RBC AUTO: 83 FL (ref 78–100)
PHOSPHATE SERPL-MCNC: 3.1 MG/DL (ref 2.5–4.5)
PLATELET # BLD AUTO: 394 10E3/UL (ref 150–450)
POTASSIUM SERPL-SCNC: 4 MMOL/L (ref 3.4–5.3)
RBC # BLD AUTO: 2.89 10E6/UL (ref 4.4–5.9)
SODIUM SERPL-SCNC: 131 MMOL/L (ref 135–145)
WBC # BLD AUTO: 7.2 10E3/UL (ref 4–11)

## 2025-02-03 PROCEDURE — 250N000013 HC RX MED GY IP 250 OP 250 PS 637: Performed by: STUDENT IN AN ORGANIZED HEALTH CARE EDUCATION/TRAINING PROGRAM

## 2025-02-03 PROCEDURE — 83735 ASSAY OF MAGNESIUM: CPT | Performed by: STUDENT IN AN ORGANIZED HEALTH CARE EDUCATION/TRAINING PROGRAM

## 2025-02-03 PROCEDURE — 82565 ASSAY OF CREATININE: CPT

## 2025-02-03 PROCEDURE — 250N000013 HC RX MED GY IP 250 OP 250 PS 637: Performed by: INTERNAL MEDICINE

## 2025-02-03 PROCEDURE — 250N000011 HC RX IP 250 OP 636: Performed by: STUDENT IN AN ORGANIZED HEALTH CARE EDUCATION/TRAINING PROGRAM

## 2025-02-03 PROCEDURE — 250N000013 HC RX MED GY IP 250 OP 250 PS 637: Performed by: NURSE PRACTITIONER

## 2025-02-03 PROCEDURE — 80048 BASIC METABOLIC PNL TOTAL CA: CPT

## 2025-02-03 PROCEDURE — 250N000013 HC RX MED GY IP 250 OP 250 PS 637

## 2025-02-03 PROCEDURE — 99239 HOSP IP/OBS DSCHRG MGMT >30: CPT | Mod: FS | Performed by: STUDENT IN AN ORGANIZED HEALTH CARE EDUCATION/TRAINING PROGRAM

## 2025-02-03 PROCEDURE — 36415 COLL VENOUS BLD VENIPUNCTURE: CPT

## 2025-02-03 PROCEDURE — 250N000013 HC RX MED GY IP 250 OP 250 PS 637: Performed by: PHYSICIAN ASSISTANT

## 2025-02-03 PROCEDURE — 84100 ASSAY OF PHOSPHORUS: CPT

## 2025-02-03 PROCEDURE — 99207 PR APP CREDIT; MD BILLING SHARED VISIT: CPT | Mod: FS

## 2025-02-03 PROCEDURE — 85014 HEMATOCRIT: CPT

## 2025-02-03 RX ORDER — METOPROLOL SUCCINATE 25 MG/1
25 TABLET, EXTENDED RELEASE ORAL DAILY
Qty: 30 TABLET | Refills: 2 | Status: ON HOLD | OUTPATIENT
Start: 2025-02-03

## 2025-02-03 RX ORDER — METOPROLOL SUCCINATE 25 MG/1
25 TABLET, EXTENDED RELEASE ORAL DAILY
Status: DISCONTINUED | OUTPATIENT
Start: 2025-02-03 | End: 2025-02-03 | Stop reason: HOSPADM

## 2025-02-03 RX ORDER — PRASUGREL 10 MG/1
10 TABLET, FILM COATED ORAL DAILY
Qty: 30 TABLET | Refills: 11 | Status: ON HOLD | OUTPATIENT
Start: 2025-02-03

## 2025-02-03 RX ORDER — POLYETHYLENE GLYCOL 3350 17 G/17G
17 POWDER, FOR SOLUTION ORAL DAILY
Qty: 510 G | Refills: 2 | Status: ON HOLD | OUTPATIENT
Start: 2025-02-03

## 2025-02-03 RX ORDER — ASPIRIN 81 MG/1
81 TABLET, CHEWABLE ORAL DAILY
Qty: 30 TABLET | Refills: 11 | Status: ON HOLD | OUTPATIENT
Start: 2025-02-03

## 2025-02-03 RX ORDER — SENNOSIDES 8.6 MG
1-2 TABLET ORAL 2 TIMES DAILY
Qty: 120 TABLET | Refills: 2 | Status: ON HOLD | OUTPATIENT
Start: 2025-02-03

## 2025-02-03 RX ORDER — BICTEGRAVIR SODIUM, EMTRICITABINE, AND TENOFOVIR ALAFENAMIDE FUMARATE 50; 200; 25 MG/1; MG/1; MG/1
1 TABLET ORAL DAILY
Qty: 30 TABLET | Refills: 2 | Status: ACTIVE | OUTPATIENT
Start: 2025-02-04

## 2025-02-03 RX ORDER — OXYCODONE HYDROCHLORIDE 5 MG/1
5 TABLET ORAL EVERY 6 HOURS PRN
Qty: 12 TABLET | Refills: 0 | Status: SHIPPED | OUTPATIENT
Start: 2025-02-03 | End: 2025-02-07

## 2025-02-03 RX ORDER — LOSARTAN POTASSIUM 25 MG/1
12.5 TABLET ORAL DAILY
Qty: 15 TABLET | Refills: 2 | Status: ON HOLD | OUTPATIENT
Start: 2025-02-03

## 2025-02-03 RX ORDER — ATORVASTATIN CALCIUM 40 MG/1
40 TABLET, FILM COATED ORAL EVERY EVENING
Qty: 30 TABLET | Refills: 11 | Status: ON HOLD | OUTPATIENT
Start: 2025-02-03

## 2025-02-03 RX ORDER — SIMETHICONE 80 MG
160 TABLET,CHEWABLE ORAL 3 TIMES DAILY PRN
Qty: 180 TABLET | Refills: 2 | Status: ON HOLD | OUTPATIENT
Start: 2025-02-03

## 2025-02-03 RX ORDER — PANTOPRAZOLE SODIUM 40 MG/1
40 TABLET, DELAYED RELEASE ORAL
Qty: 60 TABLET | Refills: 2 | Status: ON HOLD | OUTPATIENT
Start: 2025-02-03

## 2025-02-03 RX ORDER — BISACODYL 10 MG
10 SUPPOSITORY, RECTAL RECTAL DAILY PRN
Qty: 30 SUPPOSITORY | Refills: 2 | Status: ON HOLD | OUTPATIENT
Start: 2025-02-03

## 2025-02-03 RX ADMIN — PANTOPRAZOLE SODIUM 40 MG: 40 TABLET, DELAYED RELEASE ORAL at 08:21

## 2025-02-03 RX ADMIN — ACETAMINOPHEN 650 MG: 325 TABLET, FILM COATED ORAL at 12:32

## 2025-02-03 RX ADMIN — SIMETHICONE CHEW TAB 80 MG 160 MG: 80 TABLET ORAL at 08:20

## 2025-02-03 RX ADMIN — PRASUGREL 10 MG: 10 TABLET, FILM COATED ORAL at 08:30

## 2025-02-03 RX ADMIN — HYDROMORPHONE HYDROCHLORIDE 0.5 MG: 1 INJECTION, SOLUTION INTRAMUSCULAR; INTRAVENOUS; SUBCUTANEOUS at 08:17

## 2025-02-03 RX ADMIN — POLYETHYLENE GLYCOL 3350 17 G: 17 POWDER, FOR SOLUTION ORAL at 08:21

## 2025-02-03 RX ADMIN — OXYCODONE HYDROCHLORIDE 5 MG: 5 TABLET ORAL at 01:20

## 2025-02-03 RX ADMIN — SENNOSIDES 8.6 MG: 8.6 TABLET ORAL at 08:21

## 2025-02-03 RX ADMIN — INSULIN GLARGINE 15 UNITS: 100 INJECTION, SOLUTION SUBCUTANEOUS at 08:32

## 2025-02-03 RX ADMIN — BICTEGRAVIR SODIUM, EMTRICITABINE, AND TENOFOVIR ALAFENAMIDE FUMARATE 1 TABLET: 50; 200; 25 TABLET ORAL at 08:30

## 2025-02-03 RX ADMIN — METOPROLOL SUCCINATE 25 MG: 25 TABLET, EXTENDED RELEASE ORAL at 08:21

## 2025-02-03 RX ADMIN — ASPIRIN 81 MG CHEWABLE TABLET 81 MG: 81 TABLET CHEWABLE at 08:21

## 2025-02-03 RX ADMIN — BISACODYL 10 MG: 10 SUPPOSITORY RECTAL at 11:36

## 2025-02-03 RX ADMIN — ACETAMINOPHEN 650 MG: 325 TABLET, FILM COATED ORAL at 03:32

## 2025-02-03 RX ADMIN — SIMETHICONE CHEW TAB 80 MG 160 MG: 80 TABLET ORAL at 12:31

## 2025-02-03 RX ADMIN — OXYCODONE HYDROCHLORIDE 5 MG: 5 TABLET ORAL at 11:11

## 2025-02-03 RX ADMIN — MAGNESIUM HYDROXIDE 30 ML: 400 SUSPENSION ORAL at 11:12

## 2025-02-03 RX ADMIN — Medication 12.5 MG: at 12:31

## 2025-02-03 RX ADMIN — CALCIUM CARBONATE (ANTACID) CHEW TAB 500 MG 1000 MG: 500 CHEW TAB at 03:32

## 2025-02-03 ASSESSMENT — ACTIVITIES OF DAILY LIVING (ADL)
ADLS_ACUITY_SCORE: 44
ADLS_ACUITY_SCORE: 36
ADLS_ACUITY_SCORE: 44
ADLS_ACUITY_SCORE: 36
ADLS_ACUITY_SCORE: 44
ADLS_ACUITY_SCORE: 36

## 2025-02-03 NOTE — PLAN OF CARE
Neuro: A&Ox4. Patient can be agitated and irritable at times.  Cardiac: No Tele.  VSS.   Respiratory: on RA.  GI/: Adequate urine output.  Diet/appetite: regular diet. Requested turkey sandwiches and lot of snacks  Activity:  Up ad lexi  Pain: At acceptable level on current regimen. PRN Tylenol, IV Dilaudid and oxycodone  Skin: No new deficits noted.  LDA's: PIV    Plan: Continue with POC. Notify primary team with changes.    Goal Outcome Evaluation:      Plan of Care Reviewed With: patient    Overall Patient Progress: no changeOverall Patient Progress: no change    Outcome Evaluation: pt requires around the clock pain management with PRN oxycodone, Tylenol and Dilaudid.      Problem: Intestinal Obstruction  Goal: Absence of Infection Signs and Symptoms  Intervention: Prevent or Manage Infection  Recent Flowsheet Documentation  Taken 2/2/2025 2323 by Kings Veliz, RN  Isolation Precautions: contact precautions maintained  Taken 2/2/2025 1942 by Kings Veliz, RN  Isolation Precautions: contact precautions maintained     Problem: Intestinal Obstruction  Goal: Optimal Pain Control and Function  Intervention: Prevent or Manage Pain  Recent Flowsheet Documentation  Taken 2/2/2025 2323 by Kings Veliz, RN  Pain Management Interventions:   medication (see MAR)   repositioned   rest   awakened for pain meds per patient request  Taken 2/2/2025 1942 by Kings Veliz, RN  Pain Management Interventions:   medication (see MAR)   repositioned   rest   awakened for pain meds per patient request

## 2025-02-03 NOTE — PROGRESS NOTES
Care Management Follow Up    Additional information    2/3 - CHW delegated by DAYNA, has scheduled a discharge ride for pt to go to [facility]. Scheduled through his insurance, transport will be completed by [facility] ([phone]). Service rep stated the  will call pt about 20 mins before arrival. DAYNA notified, no further action taken.       Adriane Simmons  [facility] Health Worker  6A, 6B, 6C  Ph [phone]  Fax [phone]

## 2025-02-03 NOTE — PROGRESS NOTES
-Pt received orders that were carried out to discharge from the hospital.  -Pt received and understood his discharge instructions, orders, follow-up appointments and prescriptions.  -Pt has a ride set up for 13:00.  -Pt to recover at [facility].  -Pt to have visits scheduled from a home health care nurse.  -Pt sent with supplies for dressing changes.  -Pt to  prescriptions from hospital discharge pharmacy.

## 2025-02-03 NOTE — PROGRESS NOTES
Care Management Discharge Note    Discharge Date: 02/03/2025       Discharge Disposition: Shelter    [facility]  [address] [place] [address]  Temporary address: [address] [place] [address]  Phone: [phone]    (Nimo Moe): [phone]  Emergency Shelter Manager (Ju): [phone]  Fax: [phone] (for sending discharge orders)  - 2/3: DAYNA updated Sandra (RN at the shelter and apart of admissions team) via Teams on pt being med ready, having confirmed home care agency for his wound cares, and no feeding tube. Sandra told the SW that the shelter has been holding the pt's bed so can return ideally before 7:30pm but latest would be 10pm.    Later on, DAYNA updated Sandra via Teams the pt's discharge ride from the hospital, orders sent via in basket, and the start of care date with home care.         Discharge Services: Home Care (DAYNA updated [facility] on pt's discharge today)    [facility]  Phone: [phone]  Fax: [phone]  - 2/3: DAYNA called agency at 9:08am and got transferred to [place] in admissions/intake. DAYNA updated Geovanna on pt's discharge for today and she told the SW that they can pull orders via Epic so SW would not need to send them orders once signed. DAYNA called again at 11:22am, got transferred to [place], and she confirmed with SW that home care start of care date would be either tomorrow 2/4 or Wednesday 2/5.       Discharge DME: None    Discharge Transportation: public transportation, other (see comments) (tplus)    DAYNA tasked 6th floor Community Health Worker to set up discharge ride through [facility] (pt's listed insurance) for around 1pm today 2/3. DAYNA received update from CHW that pt's discharge ride has been set up through his insurance benefit for 1pm pickup. See CHW note 2/3 for details.    Private pay costs discussed: Not applicable    Does the patient's insurance plan have a 3 day  qualifying hospital stay waiver?  No    PAS Confirmation Code:  (not needed as pt is discharging back to [facility])  Patient/family educated on Medicare website which has current facility and service quality ratings: no    Education Provided on the Discharge Plan: Yes  Persons Notified of Discharge Plans: pt, Sandra in admissions at [facility], bedside RN, charge RN, Gold 6 provider, RNCC  Patient/Family in Agreement with the Plan: yes    Handoff Referral Completed: Yes, [facility] PCP: Internal handoff referral completed    Additional Information:  The Gold  provider (Clemencia) confirmed with the SW that the pt is med ready and no feeding tube in place.       DAYNA updated the bedside RN via [employer] on pt discharging today and that per 1/31 bedside RN sticky note, WO RN provided pt with needed wound care supplies (which reflects in addendum on [facility] 1/27 note).     Later on, DAYNA updated bedside RN via [employer] on pt's discharge ride and company.     Per chart review, pt has PCP appointment scheduled at 1:30pm at [facility] which shows on discharge orders.    ___________________    RADHA Win, LGSW  6C , covering beds 6401 to 6519  [facility]   Phone: [phone]  6C Cards DAYNA Donaldson

## 2025-03-03 NOTE — PROGRESS NOTES
At 1450 pt left the unit as he said, he would. He removed his PIV and was dressed.  
Observation goals: Prior to discharge  -diagnostic tests and consults completed and resulted: CT of abdomen done, possible early SBO vs. ileus  -vital signs normal or at patient baseline: yes  -tolerating oral intake to maintain hydration: NPO  -adequate pain control on oral analgesics: no, gave pt one time dose of dilaudid  -returns to baseline functional status: no, pain keeps him from doing some things  -safe disposition plan has been identified: no  
Observation goals: Prior to discharge  -diagnostic tests and consults completed and resulted: CT of abdomen done.  -vital signs normal or at patient baseline: Yes.  -tolerating oral intake to maintain hydration: NPO.  -adequate pain control on oral analgesics: Pt bradley pain.  -returns to baseline functional status: No.  -safe disposition plan has been identified: No.  
Writer called to 7B where patient's room was. Patient reported to nurse he needed to leave and would pull his IV out. Writer showed up to room and patient was already clothed and IV was taken out per patient. Discussed with patient, he would be leaving AMA and he understood. Recommended outpatient EGD per consulting team GI's request. This will be ordered per GI as an outpatient. Patient reports he is aware he should follow up outpatient and he agreed. Patient signed AMA sheet with nursing.   
palpitations

## (undated) DEVICE — CATH ANGIO 6FR JL3.5 100CM ST+

## (undated) DEVICE — SU SILK 4-0 TIE 12X30" A303H

## (undated) DEVICE — SU SILK 3-0 TIE 12X30" A304H

## (undated) DEVICE — COVER CAMERA IN-LIGHT DISP LT-C02

## (undated) DEVICE — LINEN TOWEL PACK X6 WHITE 5487

## (undated) DEVICE — SOL WATER IRRIG 1000ML BOTTLE 2F7114

## (undated) DEVICE — PREP POVIDONE IODINE SOLUTION 10% 4OZ BOTTLE 29906-004

## (undated) DEVICE — GUIDEWIRE VASC 0.014INX180CM RUNTHROUGH 25-1011

## (undated) DEVICE — SU SILK 2-0 TIE 12X30" A305H

## (undated) DEVICE — ENDO TROCAR 05MM VERSAONE BLADELESS W/STD FIX CAN NONB5STF

## (undated) DEVICE — WIPES FOLEY CARE SURESTEP PROVON DFC100

## (undated) DEVICE — BNDG ABDOMINAL BINDER 9X45-62" 79-89071

## (undated) DEVICE — BLADE CLIPPER DISP 4406

## (undated) DEVICE — CATH TRAY FOLEY SURESTEP 16FR W/URNE MTR STLK LATEX A303316A

## (undated) DEVICE — ESU HARMONIC ACE LAPAROSCOPIC SHEARS 5MMX36CM CVD HAR36

## (undated) DEVICE — PREP SKIN SCRUB TRAY 4461A

## (undated) DEVICE — BLADE CLIPPER SGL USE 9680

## (undated) DEVICE — PREP CHLORAPREP 26ML TINTED ORANGE  260815

## (undated) DEVICE — ESU GROUND PAD ADULT W/CORD E7507

## (undated) DEVICE — GLOVE PROTEXIS MICRO 7.0  2D73PM70

## (undated) DEVICE — ESU PENCIL W/COATED BLADE E2450H

## (undated) DEVICE — SU VICRYL 3-0 SH 8X18" UND J864D

## (undated) DEVICE — KNIFE HANDLE W/15 BLADE 371615

## (undated) DEVICE — GOWN IMPERVIOUS BREATHABLE SMART XLG 89045

## (undated) DEVICE — ESU LIGASURE IMPACT OPEN SEALER/DVDR CVD LG JAW LF4418

## (undated) DEVICE — ENDO POUCH GOLD 10MM ECATCH 173050G

## (undated) DEVICE — PACK HEART LEFT CUSTOM

## (undated) DEVICE — LINEN TOWEL PACK X5 5464

## (undated) DEVICE — SU SILK 0 TIE 6X30" A306H

## (undated) DEVICE — CATH BALLOON NC EMERGE 2.50X12MM H7493926712250

## (undated) DEVICE — CATH US OD 5FR OD SEC 2.9FR EAGLE EYE PLATINUM 0.014 85900P

## (undated) DEVICE — CATH ANGIO SUPERTORQUE PLUS JR4 6FRX100CM 533621

## (undated) DEVICE — SU ENDO POLYSORB 0 3" LOOP 21" EL-21-L

## (undated) DEVICE — KIT DVC ANGIO IBASIXCOMPAK 13INX20ML 3WAY IN4430

## (undated) DEVICE — RAD CLOSURE ANGIOSEAL 8FR  610131

## (undated) DEVICE — ADHESIVE SWIFTSET 0.8ML OCTYL SS6

## (undated) DEVICE — CATH BALLOON NC EMERGE 4.00X6MM H7493926706400

## (undated) DEVICE — Device

## (undated) DEVICE — DRSG GAUZE FLUFTEX RADIOPAQUE 4.5"X4.1YD 11-020

## (undated) DEVICE — LINEN TOWEL PACK X30 5481

## (undated) DEVICE — DRSG TELFA 3X8" 1238

## (undated) DEVICE — GLOVE BIOGEL PI ULTRATOUCH SZ 7.5 41175

## (undated) DEVICE — CUTTING BALLOON WOLVERINE 2.5X10MM

## (undated) DEVICE — GLOVE BIOGEL PI MICRO INDICATOR UNDERGLOVE SZ 7.5 48975

## (undated) DEVICE — ENDO CANNULA 05MM VERSAONE UNIVERSAL UNVCA5STF

## (undated) DEVICE — CUP AND LID 2PK 2OZ STERILE  SSK9006A

## (undated) DEVICE — SUCTION MANIFOLD DORNOCH ULTRA CART UL-CL500

## (undated) DEVICE — SU MONOCRYL 4-0 PS-2 27" UND Y426H

## (undated) DEVICE — SYR 10ML FINGER CONTROL W/O NDL 309695

## (undated) DEVICE — INTRO SHEATH MICRO PLATINUM TIP 4FRX40CM 7274

## (undated) DEVICE — DRSG GAUZE 4X4" TRAY

## (undated) DEVICE — ESU ELEC NDL 6" COATED/INSULATED E1465-6

## (undated) DEVICE — STPL LINEAR CUT 75MM TLC75

## (undated) DEVICE — WIRE GUIDE HI-TRQ  WHISPER MS JTIP 0.014"X190CM 1005357HJ

## (undated) DEVICE — CATH BALLOON NC EMERGE 3.00X12MM H7493926712300

## (undated) DEVICE — PREP CHLORAPREP 26ML TINTED HI-LITE ORANGE 930815

## (undated) DEVICE — NDL COUNTER 20CT 31142493

## (undated) DEVICE — SLEEVE TR BAND RADIAL COMPRESSION DEVICE 24CM TRB24-REG

## (undated) DEVICE — ESU ELEC BLADE 6" COATED E1450-6

## (undated) DEVICE — SU MONOCRYL 4-0 PS-2 18" UND Y496G

## (undated) DEVICE — SWAB PROCTO OR

## (undated) DEVICE — ESU ELEC NDL 1" COATED/INSULATED E1465

## (undated) DEVICE — ENDO SHEARS 5MM 176643

## (undated) DEVICE — KIT HAND CONTROL ACIST 014644 AR-P54

## (undated) DEVICE — TAPE MEDIPORE 4"X2YD 2864

## (undated) DEVICE — PREP POVIDONE IODINE SOLUTION 10% 4OZ

## (undated) DEVICE — GLOVE PROTEXIS BLUE W/NEU-THERA 7.5  2D73EB75

## (undated) DEVICE — INTRO SHEATH 7FRX25CM PINNACLE RSS706

## (undated) DEVICE — STPL RELOAD LINEAR CUT 100X3.8MM TCR10

## (undated) DEVICE — SUCTION IRR STRYKERFLOW II W/TIP 250-070-520

## (undated) DEVICE — SPONGE LAP 18X18" X8435

## (undated) DEVICE — STPL LINEAR 90 X 3.5MM TA9035S

## (undated) DEVICE — BLADE KNIFE SURG 10 371110

## (undated) DEVICE — CATH BALLOON NC EMERGE 3.50X12MM H7493926712350

## (undated) DEVICE — GLOVE PROTEXIS BLUE W/NEU-THERA 7.0  2D73EB70

## (undated) DEVICE — SUCTION TIP POOLE K770

## (undated) DEVICE — DRSG ABDOMINAL 07 1/2X8" 7197D

## (undated) DEVICE — SOL NACL 0.9% IRRIG 1000ML BOTTLE 2F7124

## (undated) DEVICE — APPLICATOR COTTON TIP 6"X2 STERILE LF 6012

## (undated) DEVICE — TUBING PRESSURE 30"

## (undated) DEVICE — WIPE PREMOIST CLEANSING WASHCLOTHS 7988

## (undated) DEVICE — SUCTION MANIFOLD NEPTUNE 2 SYS 4 PORT 0702-020-000

## (undated) DEVICE — ENDO TROCAR 12MM VERSAONE BLADELESS W/STD FIX CAN NONB12STF

## (undated) DEVICE — GW VASC .035IN DIA 260CML 7CML 3 MM RADIUS J CURVE 502455

## (undated) DEVICE — DRSG TELFA ISLAND 4X14" 7544

## (undated) DEVICE — SOL NACL 0.9% INJ 1000ML BAG 07983-09

## (undated) DEVICE — CATH ANGIO INFINITI JL3.5 4FRX100CM 538418

## (undated) DEVICE — PACK RECTAL UMMC

## (undated) DEVICE — STPL RELOAD LINEAR CUT 75MM TCR75

## (undated) DEVICE — SU DERMABOND ADVANCED .7ML DNX12

## (undated) DEVICE — LIGHT HANDLE X1 31140133

## (undated) DEVICE — PACK AB HYST II

## (undated) DEVICE — ENDO TROCAR BLUNT 100MM W/THRD ANCHOR BLUNTPORT BPT12STS

## (undated) DEVICE — GLOVE PROTEXIS POWDER FREE SMT 6.5  2D72PT65X

## (undated) DEVICE — MANIFOLD KIT ANGIO AUTOMATED 014613

## (undated) DEVICE — TUBING SMOKE EVAC 2.2CMX3M SEA3715

## (undated) DEVICE — VALVE HEMOSTASIS .096" COPILOT MECH 1003331

## (undated) DEVICE — ANOSCOPE PLASTIC CLEAR UNSTERILE 82420

## (undated) DEVICE — ANTIFOG SOLUTION W/FOAM PAD 31142527

## (undated) DEVICE — STPL LINEAR CUT 100MM TLC10

## (undated) DEVICE — NDL INSUFFLATION 120MM VERRES 172015

## (undated) DEVICE — SU PDS II 0 TP-1 60" Z991G

## (undated) DEVICE — SHTH INTRO 0.021IN ID 6FR DIA

## (undated) DEVICE — PREP POVIDONE IODINE SCRUB 7.5% 4OZ APL82212

## (undated) DEVICE — DRAPE SHEET REV FOLD 3/4 9349

## (undated) DEVICE — DRAPE IOBAN INCISE 23X17" 6650EZ

## (undated) RX ORDER — PHENYLEPHRINE HCL IN 0.9% NACL 1 MG/10 ML
SYRINGE (ML) INTRAVENOUS
Status: DISPENSED
Start: 2018-04-16

## (undated) RX ORDER — KETOROLAC TROMETHAMINE 30 MG/ML
INJECTION, SOLUTION INTRAMUSCULAR; INTRAVENOUS
Status: DISPENSED
Start: 2018-04-16

## (undated) RX ORDER — DEXAMETHASONE SODIUM PHOSPHATE 4 MG/ML
INJECTION, SOLUTION INTRA-ARTICULAR; INTRALESIONAL; INTRAMUSCULAR; INTRAVENOUS; SOFT TISSUE
Status: DISPENSED
Start: 2023-03-28

## (undated) RX ORDER — LIDOCAINE HYDROCHLORIDE 10 MG/ML
INJECTION, SOLUTION EPIDURAL; INFILTRATION; INTRACAUDAL; PERINEURAL
Status: DISPENSED
Start: 2020-01-29

## (undated) RX ORDER — BUPIVACAINE HYDROCHLORIDE 5 MG/ML
INJECTION, SOLUTION EPIDURAL; INTRACAUDAL
Status: DISPENSED
Start: 2018-04-16

## (undated) RX ORDER — HYDROMORPHONE HCL IN WATER/PF 6 MG/30 ML
PATIENT CONTROLLED ANALGESIA SYRINGE INTRAVENOUS
Status: DISPENSED
Start: 2024-12-21

## (undated) RX ORDER — LIDOCAINE HYDROCHLORIDE 10 MG/ML
INJECTION, SOLUTION EPIDURAL; INFILTRATION; INTRACAUDAL; PERINEURAL
Status: DISPENSED
Start: 2018-04-16

## (undated) RX ORDER — LIDOCAINE HYDROCHLORIDE AND EPINEPHRINE 10; 10 MG/ML; UG/ML
INJECTION, SOLUTION INFILTRATION; PERINEURAL
Status: DISPENSED
Start: 2019-01-03

## (undated) RX ORDER — FENTANYL CITRATE 50 UG/ML
INJECTION, SOLUTION INTRAMUSCULAR; INTRAVENOUS
Status: DISPENSED
Start: 2018-04-16

## (undated) RX ORDER — LIDOCAINE HYDROCHLORIDE AND EPINEPHRINE 10; 10 MG/ML; UG/ML
INJECTION, SOLUTION INFILTRATION; PERINEURAL
Status: DISPENSED
Start: 2018-04-16

## (undated) RX ORDER — DEXAMETHASONE SODIUM PHOSPHATE 4 MG/ML
INJECTION, SOLUTION INTRA-ARTICULAR; INTRALESIONAL; INTRAMUSCULAR; INTRAVENOUS; SOFT TISSUE
Status: DISPENSED
Start: 2018-04-16

## (undated) RX ORDER — LIDOCAINE HYDROCHLORIDE 10 MG/ML
INJECTION, SOLUTION EPIDURAL; INFILTRATION; INTRACAUDAL; PERINEURAL
Status: DISPENSED
Start: 2025-01-30

## (undated) RX ORDER — FENTANYL CITRATE 50 UG/ML
INJECTION, SOLUTION INTRAMUSCULAR; INTRAVENOUS
Status: DISPENSED
Start: 2018-01-31

## (undated) RX ORDER — OXYCODONE HYDROCHLORIDE 5 MG/1
TABLET ORAL
Status: DISPENSED
Start: 2018-02-01

## (undated) RX ORDER — SODIUM NITROPRUSSIDE 25 MG/ML
INJECTION INTRAVENOUS
Status: DISPENSED
Start: 2025-01-30

## (undated) RX ORDER — FENTANYL CITRATE 50 UG/ML
INJECTION, SOLUTION INTRAMUSCULAR; INTRAVENOUS
Status: DISPENSED
Start: 2024-12-24

## (undated) RX ORDER — HYDROMORPHONE HYDROCHLORIDE 1 MG/ML
INJECTION, SOLUTION INTRAMUSCULAR; INTRAVENOUS; SUBCUTANEOUS
Status: DISPENSED
Start: 2024-12-20

## (undated) RX ORDER — LIDOCAINE HYDROCHLORIDE 10 MG/ML
INJECTION, SOLUTION EPIDURAL; INFILTRATION; INTRACAUDAL; PERINEURAL
Status: DISPENSED
Start: 2022-03-14

## (undated) RX ORDER — TRIAMCINOLONE ACETONIDE 40 MG/ML
INJECTION, SUSPENSION INTRA-ARTICULAR; INTRAMUSCULAR
Status: DISPENSED
Start: 2018-07-02

## (undated) RX ORDER — SODIUM CHLORIDE, SODIUM LACTATE, POTASSIUM CHLORIDE, CALCIUM CHLORIDE 600; 310; 30; 20 MG/100ML; MG/100ML; MG/100ML; MG/100ML
INJECTION, SOLUTION INTRAVENOUS
Status: DISPENSED
Start: 2024-12-24

## (undated) RX ORDER — BUPIVACAINE HYDROCHLORIDE 2.5 MG/ML
INJECTION, SOLUTION EPIDURAL; INFILTRATION; INTRACAUDAL
Status: DISPENSED
Start: 2018-04-16

## (undated) RX ORDER — LIDOCAINE HYDROCHLORIDE 20 MG/ML
INJECTION, SOLUTION EPIDURAL; INFILTRATION; INTRACAUDAL; PERINEURAL
Status: DISPENSED
Start: 2020-09-09

## (undated) RX ORDER — HEPARIN SODIUM 200 [USP'U]/100ML
INJECTION, SOLUTION INTRAVENOUS
Status: DISPENSED
Start: 2025-01-30

## (undated) RX ORDER — CITRIC ACID/SODIUM CITRATE 334-500MG
SOLUTION, ORAL ORAL
Status: DISPENSED
Start: 2020-09-09

## (undated) RX ORDER — LIDOCAINE HYDROCHLORIDE 10 MG/ML
INJECTION, SOLUTION EPIDURAL; INFILTRATION; INTRACAUDAL; PERINEURAL
Status: DISPENSED
Start: 2019-06-08

## (undated) RX ORDER — ACETIC ACID 5 %
LIQUID (ML) MISCELLANEOUS
Status: DISPENSED
Start: 2020-09-09

## (undated) RX ORDER — DIPHENHYDRAMINE HYDROCHLORIDE 50 MG/ML
INJECTION INTRAMUSCULAR; INTRAVENOUS
Status: DISPENSED
Start: 2022-06-06

## (undated) RX ORDER — FENTANYL CITRATE 50 UG/ML
INJECTION, SOLUTION INTRAMUSCULAR; INTRAVENOUS
Status: DISPENSED
Start: 2018-02-05

## (undated) RX ORDER — GLYCOPYRROLATE 0.2 MG/ML
INJECTION, SOLUTION INTRAMUSCULAR; INTRAVENOUS
Status: DISPENSED
Start: 2018-04-16

## (undated) RX ORDER — TRIAMCINOLONE ACETONIDE 40 MG/ML
INJECTION, SUSPENSION INTRA-ARTICULAR; INTRAMUSCULAR
Status: DISPENSED
Start: 2020-02-13

## (undated) RX ORDER — PRASUGREL 10 MG/1
TABLET, FILM COATED ORAL
Status: DISPENSED
Start: 2025-01-30

## (undated) RX ORDER — HYDROMORPHONE HCL/0.9% NACL/PF 0.2MG/0.2
SYRINGE (ML) INTRAVENOUS
Status: DISPENSED
Start: 2018-02-01

## (undated) RX ORDER — SODIUM CHLORIDE, SODIUM LACTATE, POTASSIUM CHLORIDE, CALCIUM CHLORIDE 600; 310; 30; 20 MG/100ML; MG/100ML; MG/100ML; MG/100ML
INJECTION, SOLUTION INTRAVENOUS
Status: DISPENSED
Start: 2018-02-01

## (undated) RX ORDER — CEFAZOLIN SODIUM 2 G/100ML
INJECTION, SOLUTION INTRAVENOUS
Status: DISPENSED
Start: 2018-04-16

## (undated) RX ORDER — PROPOFOL 10 MG/ML
INJECTION, EMULSION INTRAVENOUS
Status: DISPENSED
Start: 2020-09-09

## (undated) RX ORDER — NICARDIPINE HCL-0.9% SOD CHLOR 1 MG/10 ML
SYRINGE (ML) INTRAVENOUS
Status: DISPENSED
Start: 2025-01-30

## (undated) RX ORDER — PROPOFOL 10 MG/ML
INJECTION, EMULSION INTRAVENOUS
Status: DISPENSED
Start: 2023-03-28

## (undated) RX ORDER — HYDROMORPHONE HYDROCHLORIDE 1 MG/ML
INJECTION, SOLUTION INTRAMUSCULAR; INTRAVENOUS; SUBCUTANEOUS
Status: DISPENSED
Start: 2025-01-30

## (undated) RX ORDER — TRIAMCINOLONE ACETONIDE 40 MG/ML
INJECTION, SUSPENSION INTRA-ARTICULAR; INTRAMUSCULAR
Status: DISPENSED
Start: 2019-01-10

## (undated) RX ORDER — PROPOFOL 10 MG/ML
INJECTION, EMULSION INTRAVENOUS
Status: DISPENSED
Start: 2018-04-16

## (undated) RX ORDER — HEPARIN SODIUM 1000 [USP'U]/ML
INJECTION, SOLUTION INTRAVENOUS; SUBCUTANEOUS
Status: DISPENSED
Start: 2025-01-30

## (undated) RX ORDER — GABAPENTIN 100 MG/1
CAPSULE ORAL
Status: DISPENSED
Start: 2018-02-01

## (undated) RX ORDER — HYDROMORPHONE HCL IN WATER/PF 6 MG/30 ML
PATIENT CONTROLLED ANALGESIA SYRINGE INTRAVENOUS
Status: DISPENSED
Start: 2024-12-20

## (undated) RX ORDER — LIDOCAINE HYDROCHLORIDE 10 MG/ML
INJECTION, SOLUTION EPIDURAL; INFILTRATION; INTRACAUDAL; PERINEURAL
Status: DISPENSED
Start: 2018-07-02

## (undated) RX ORDER — NITROGLYCERIN 5 MG/ML
VIAL (ML) INTRAVENOUS
Status: DISPENSED
Start: 2025-01-30

## (undated) RX ORDER — HYDROMORPHONE HYDROCHLORIDE 1 MG/ML
INJECTION, SOLUTION INTRAMUSCULAR; INTRAVENOUS; SUBCUTANEOUS
Status: DISPENSED
Start: 2023-03-28

## (undated) RX ORDER — LABETALOL HYDROCHLORIDE 5 MG/ML
INJECTION, SOLUTION INTRAVENOUS
Status: DISPENSED
Start: 2024-12-24

## (undated) RX ORDER — PROPOFOL 10 MG/ML
INJECTION, EMULSION INTRAVENOUS
Status: DISPENSED
Start: 2018-01-31

## (undated) RX ORDER — TRIAMCINOLONE ACETONIDE 40 MG/ML
INJECTION, SUSPENSION INTRA-ARTICULAR; INTRAMUSCULAR
Status: DISPENSED
Start: 2019-06-08

## (undated) RX ORDER — FENTANYL CITRATE 50 UG/ML
INJECTION, SOLUTION INTRAMUSCULAR; INTRAVENOUS
Status: DISPENSED
Start: 2022-06-06

## (undated) RX ORDER — ONDANSETRON 2 MG/ML
INJECTION INTRAMUSCULAR; INTRAVENOUS
Status: DISPENSED
Start: 2018-04-16

## (undated) RX ORDER — LIDOCAINE HYDROCHLORIDE 10 MG/ML
INJECTION, SOLUTION EPIDURAL; INFILTRATION; INTRACAUDAL; PERINEURAL
Status: DISPENSED
Start: 2018-02-12

## (undated) RX ORDER — LIDOCAINE HYDROCHLORIDE 20 MG/ML
INJECTION, SOLUTION EPIDURAL; INFILTRATION; INTRACAUDAL; PERINEURAL
Status: DISPENSED
Start: 2018-01-31

## (undated) RX ORDER — ONDANSETRON 2 MG/ML
INJECTION INTRAMUSCULAR; INTRAVENOUS
Status: DISPENSED
Start: 2020-09-09

## (undated) RX ORDER — CALCIUM CARBONATE 500 MG/1
TABLET, CHEWABLE ORAL
Status: DISPENSED
Start: 2020-09-09

## (undated) RX ORDER — FENTANYL CITRATE 50 UG/ML
INJECTION, SOLUTION INTRAMUSCULAR; INTRAVENOUS
Status: DISPENSED
Start: 2023-03-28

## (undated) RX ORDER — LIDOCAINE HYDROCHLORIDE 10 MG/ML
INJECTION, SOLUTION INFILTRATION; PERINEURAL
Status: DISPENSED
Start: 2018-01-17

## (undated) RX ORDER — LIDOCAINE HYDROCHLORIDE 10 MG/ML
INJECTION, SOLUTION INFILTRATION; PERINEURAL
Status: DISPENSED
Start: 2017-01-03

## (undated) RX ORDER — TRIAMCINOLONE ACETONIDE 40 MG/ML
INJECTION, SUSPENSION INTRA-ARTICULAR; INTRAMUSCULAR
Status: DISPENSED
Start: 2017-08-22

## (undated) RX ORDER — FENTANYL CITRATE 50 UG/ML
INJECTION, SOLUTION INTRAMUSCULAR; INTRAVENOUS
Status: DISPENSED
Start: 2020-09-09

## (undated) RX ORDER — TRIAMCINOLONE ACETONIDE 40 MG/ML
INJECTION, SUSPENSION INTRA-ARTICULAR; INTRAMUSCULAR
Status: DISPENSED
Start: 2018-01-17

## (undated) RX ORDER — FERRIC SUBSULFATE 0.21 G/G
LIQUID TOPICAL
Status: DISPENSED
Start: 2019-01-03

## (undated) RX ORDER — FENTANYL CITRATE 50 UG/ML
INJECTION, SOLUTION INTRAMUSCULAR; INTRAVENOUS
Status: DISPENSED
Start: 2025-01-30

## (undated) RX ORDER — BUPIVACAINE HYDROCHLORIDE 2.5 MG/ML
INJECTION, SOLUTION EPIDURAL; INFILTRATION; INTRACAUDAL
Status: DISPENSED
Start: 2024-12-20

## (undated) RX ORDER — LIDOCAINE HYDROCHLORIDE 10 MG/ML
INJECTION, SOLUTION EPIDURAL; INFILTRATION; INTRACAUDAL; PERINEURAL
Status: DISPENSED
Start: 2018-02-05

## (undated) RX ORDER — LIDOCAINE HYDROCHLORIDE 10 MG/ML
INJECTION, SOLUTION EPIDURAL; INFILTRATION; INTRACAUDAL; PERINEURAL
Status: DISPENSED
Start: 2020-02-13

## (undated) RX ORDER — TRIAMCINOLONE ACETONIDE 40 MG/ML
INJECTION, SUSPENSION INTRA-ARTICULAR; INTRAMUSCULAR
Status: DISPENSED
Start: 2017-01-03

## (undated) RX ORDER — ACETAMINOPHEN 325 MG/1
TABLET ORAL
Status: DISPENSED
Start: 2020-09-09

## (undated) RX ORDER — FENTANYL CITRATE 50 UG/ML
INJECTION, SOLUTION INTRAMUSCULAR; INTRAVENOUS
Status: DISPENSED
Start: 2024-10-10

## (undated) RX ORDER — LIDOCAINE HYDROCHLORIDE 20 MG/ML
INJECTION, SOLUTION EPIDURAL; INFILTRATION; INTRACAUDAL; PERINEURAL
Status: DISPENSED
Start: 2018-04-16

## (undated) RX ORDER — LIDOCAINE HYDROCHLORIDE 10 MG/ML
INJECTION, SOLUTION EPIDURAL; INFILTRATION; INTRACAUDAL; PERINEURAL
Status: DISPENSED
Start: 2019-01-10

## (undated) RX ORDER — FENTANYL CITRATE 50 UG/ML
INJECTION, SOLUTION INTRAMUSCULAR; INTRAVENOUS
Status: DISPENSED
Start: 2024-12-20

## (undated) RX ORDER — ACETIC ACID 5 %
LIQUID (ML) MISCELLANEOUS
Status: DISPENSED
Start: 2019-01-03

## (undated) RX ORDER — BUPIVACAINE HYDROCHLORIDE AND EPINEPHRINE 2.5; 5 MG/ML; UG/ML
INJECTION, SOLUTION EPIDURAL; INFILTRATION; INTRACAUDAL; PERINEURAL
Status: DISPENSED
Start: 2020-09-09

## (undated) RX ORDER — ERTAPENEM 1 G/1
INJECTION, POWDER, LYOPHILIZED, FOR SOLUTION INTRAMUSCULAR; INTRAVENOUS
Status: DISPENSED
Start: 2018-04-16

## (undated) RX ORDER — LIDOCAINE HYDROCHLORIDE 10 MG/ML
INJECTION, SOLUTION INFILTRATION; PERINEURAL
Status: DISPENSED
Start: 2017-08-22

## (undated) RX ORDER — ONDANSETRON 2 MG/ML
INJECTION INTRAMUSCULAR; INTRAVENOUS
Status: DISPENSED
Start: 2018-01-31

## (undated) RX ORDER — TRIAMCINOLONE ACETONIDE 40 MG/ML
INJECTION, SUSPENSION INTRA-ARTICULAR; INTRAMUSCULAR
Status: DISPENSED
Start: 2020-01-29

## (undated) RX ORDER — LIDOCAINE HYDROCHLORIDE 10 MG/ML
INJECTION, SOLUTION EPIDURAL; INFILTRATION; INTRACAUDAL; PERINEURAL
Status: DISPENSED
Start: 2019-10-17